# Patient Record
Sex: FEMALE | Race: WHITE | NOT HISPANIC OR LATINO | Employment: OTHER | URBAN - METROPOLITAN AREA
[De-identification: names, ages, dates, MRNs, and addresses within clinical notes are randomized per-mention and may not be internally consistent; named-entity substitution may affect disease eponyms.]

---

## 2017-01-06 ENCOUNTER — MYC MEDICAL ADVICE (OUTPATIENT)
Dept: INFECTIOUS DISEASES | Facility: CLINIC | Age: 67
End: 2017-01-06

## 2017-01-10 NOTE — TELEPHONE ENCOUNTER
Called Virginia January 10, 2017. She is in [place]. [facility] and [facility]. She has been out of the hospital for a week now, finished doxycycline for the pneumonia yesterday. Has a yeast infection as a result of the antibacterial medications. Still short of breath, still coughs a lot. Slow getting better. Getting impatient that it is taking so long to get better. Shower yesterday wiped her out. She appreciated the call to check on her. Crystal Montero, 10:14 AM

## 2017-01-10 NOTE — TELEPHONE ENCOUNTER
From: Luz Thompson  To: Crystal Montero MD  Sent: 1/6/2017 3:47 PM CST  Subject: Recent hospitalization    Hospitalized with right low love pneumonia Tuesday through today Friday. Treated with IV Muessem? and oral Doxycy line 100 mg q 12. Home on 3 more days of the oral stuff. I hope that does it. Blood cultures neg.     [place]

## 2017-01-20 ENCOUNTER — MYC MEDICAL ADVICE (OUTPATIENT)
Dept: INFECTIOUS DISEASES | Facility: CLINIC | Age: 67
End: 2017-01-20

## 2017-01-23 PROBLEM — B38.9 COCCIDIOIDOMYCOSIS: Status: ACTIVE | Noted: 2017-01-23

## 2017-01-30 ENCOUNTER — MYC MEDICAL ADVICE (OUTPATIENT)
Dept: INFECTIOUS DISEASES | Facility: CLINIC | Age: 67
End: 2017-01-30

## 2017-02-01 ENCOUNTER — TELEPHONE (OUTPATIENT)
Dept: TRANSPLANT | Facility: CLINIC | Age: 67
End: 2017-02-01

## 2017-02-01 DIAGNOSIS — Z94.4 LIVER REPLACED BY TRANSPLANT (H): Primary | ICD-10-CM

## 2017-02-01 RX ORDER — SIROLIMUS 0.5 MG/1
0.5 TABLET, SUGAR COATED ORAL DAILY
Qty: 90 TABLET | Refills: 3 | Status: SHIPPED | OUTPATIENT
Start: 2017-02-01 | End: 2017-02-15

## 2017-02-01 NOTE — TELEPHONE ENCOUNTER
Pt's rapamune level a 25 hour level. Pt will decrease to 0.5 mg Daily. Pt will repeat level next week.

## 2017-02-06 ENCOUNTER — TRANSFERRED RECORDS (OUTPATIENT)
Dept: HEALTH INFORMATION MANAGEMENT | Facility: CLINIC | Age: 67
End: 2017-02-06

## 2017-02-07 ENCOUNTER — TELEPHONE (OUTPATIENT)
Dept: TRANSPLANT | Facility: CLINIC | Age: 67
End: 2017-02-07

## 2017-02-07 NOTE — TELEPHONE ENCOUNTER
Pt reports having mouth sores. Pt wants to hold off on a day or two before trying magic mouth wash. Patient will check a rapamune level tomorrow.

## 2017-02-15 ENCOUNTER — TELEPHONE (OUTPATIENT)
Dept: TRANSPLANT | Facility: CLINIC | Age: 67
End: 2017-02-15

## 2017-02-15 DIAGNOSIS — Z94.4 LIVER REPLACED BY TRANSPLANT (H): ICD-10-CM

## 2017-02-15 RX ORDER — SIROLIMUS 0.5 MG/1
0.5 TABLET, SUGAR COATED ORAL EVERY OTHER DAY
Qty: 15 TABLET | Refills: 11 | Status: SHIPPED | OUTPATIENT
Start: 2017-02-15 | End: 2017-03-16

## 2017-02-15 NOTE — TELEPHONE ENCOUNTER
Pt's rapamune level 10.4 and pt has horrible mouth sores. 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 Discussed with DR Ramirez. Patient to take 0.5 mg of rapamune every other day. Pt updated medication card and box while coordinator on the phone.

## 2017-03-01 DIAGNOSIS — B38.2 COCCIDIOIDAL PNEUMONITIS (H): Primary | ICD-10-CM

## 2017-03-01 RX ORDER — CLOTRIMAZOLE 1 %
CREAM (GRAM) TOPICAL 2 TIMES DAILY PRN
Status: ON HOLD | COMMUNITY
Start: 2017-02-23 | End: 2019-08-31

## 2017-03-01 RX ORDER — MOMETASONE FUROATE 1 MG/G
CREAM TOPICAL
Qty: 15 G | Refills: 0 | COMMUNITY
Start: 2017-03-01 | End: 2017-04-19

## 2017-03-01 RX ORDER — ALBUTEROL SULFATE 90 UG/1
2 AEROSOL, METERED RESPIRATORY (INHALATION) EVERY 4 HOURS PRN
COMMUNITY
Start: 2017-03-01 | End: 2017-09-19

## 2017-03-01 RX ORDER — FLUCONAZOLE 200 MG/1
400 TABLET ORAL DAILY
Qty: 30 TABLET | COMMUNITY
Start: 2017-01-19 | End: 2017-03-15 | Stop reason: SINTOL

## 2017-03-15 ENCOUNTER — TELEPHONE (OUTPATIENT)
Dept: TRANSPLANT | Facility: CLINIC | Age: 67
End: 2017-03-15

## 2017-03-15 DIAGNOSIS — Z94.4 LIVER REPLACED BY TRANSPLANT (H): ICD-10-CM

## 2017-03-15 NOTE — TELEPHONE ENCOUNTER
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  Was diagnosed w/ Valley Fever (in [place] for the winter) January 2, very ill.  Follows w/ ID in [place], takes fluconazole 200 bid, uses inhalers.  RAPA is currently 0.5 mg every other day.  She had a 24 hour trough level drawn on 3/13 that came back at 2.5.  She also takes prednisone 5 mg po qd.  She had a chest CT done approx 1 week ago and she said it shows that she is \"improving\" and she feels better but is still extremely fatigued and needs a lot of rest.  Virginia is very particular.  Should I increase her RAPA to daily, or should I leave her w/ the lower level as she is recovering from this infection?  Message to Dr. Montano who is covering for Dr. Ramirez.  "

## 2017-03-16 ENCOUNTER — MYC MEDICAL ADVICE (OUTPATIENT)
Dept: INFECTIOUS DISEASES | Facility: CLINIC | Age: 67
End: 2017-03-16

## 2017-03-16 RX ORDER — SIROLIMUS 0.5 MG/1
1 TABLET, SUGAR COATED ORAL DAILY
Qty: 15 TABLET | Refills: 11 | COMMUNITY
Start: 2017-03-16 | End: 2017-03-27

## 2017-03-16 NOTE — TELEPHONE ENCOUNTER
Spoke to Virginia.  She saw an ID physician yesterday. She has developed a rash on her trunk that the ID  Thinks is from the fluconazole.  They are stopping her fluconazole.  With this she will increase her RAPAmune to 1 mg po qd and repeat drug level early next week.

## 2017-03-16 NOTE — TELEPHONE ENCOUNTER
She is on her 3rd month of treatment. I think it's safe to go back to regular rapa dosing after 3 months of treatment.

## 2017-03-23 DIAGNOSIS — R06.00 DYSPNEA: Primary | ICD-10-CM

## 2017-03-24 ENCOUNTER — TELEPHONE (OUTPATIENT)
Dept: TRANSPLANT | Facility: CLINIC | Age: 67
End: 2017-03-24

## 2017-03-24 NOTE — TELEPHONE ENCOUNTER
Call from [place] stating that she stopped fluconazole on about 3/16 due to hives.  Her recent RAPA level is good.  She will check another RAPA level on 3/28.

## 2017-03-27 ENCOUNTER — MYC MEDICAL ADVICE (OUTPATIENT)
Dept: INFECTIOUS DISEASES | Facility: CLINIC | Age: 67
End: 2017-03-27

## 2017-03-27 DIAGNOSIS — Z94.4 LIVER REPLACED BY TRANSPLANT (H): ICD-10-CM

## 2017-03-27 DIAGNOSIS — B38.2 COCCIDIOIDAL PNEUMONITIS (H): Primary | ICD-10-CM

## 2017-03-27 DIAGNOSIS — B38.9 COCCIDIOIDOMYCOSIS: ICD-10-CM

## 2017-03-27 RX ORDER — SIROLIMUS 0.5 MG/1
0.5 TABLET, SUGAR COATED ORAL EVERY OTHER DAY
Qty: 15 TABLET | Refills: 11 | Status: ON HOLD | COMMUNITY
Start: 2017-03-27 | End: 2017-09-08

## 2017-03-27 RX ORDER — VORICONAZOLE 200 MG/1
200 TABLET, FILM COATED ORAL 2 TIMES DAILY
COMMUNITY
Start: 2017-03-27 | End: 2017-05-10

## 2017-04-11 DIAGNOSIS — Z94.4 LIVER REPLACED BY TRANSPLANT (H): ICD-10-CM

## 2017-04-11 DIAGNOSIS — B10.81 HUMAN HERPESVIRUS 6 VIREMIA: Primary | ICD-10-CM

## 2017-04-11 LAB
ERYTHROCYTE [DISTWIDTH] IN BLOOD BY AUTOMATED COUNT: 18.6 % (ref 10–15)
HCT VFR BLD AUTO: 36.2 % (ref 35–47)
HGB BLD-MCNC: 11.5 G/DL (ref 11.7–15.7)
MCH RBC QN AUTO: 28 PG (ref 26.5–33)
MCHC RBC AUTO-ENTMCNC: 31.8 G/DL (ref 31.5–36.5)
MCV RBC AUTO: 88 FL (ref 78–100)
PLATELET # BLD AUTO: 347 10E9/L (ref 150–450)
RBC # BLD AUTO: 4.11 10E12/L (ref 3.8–5.2)
WBC # BLD AUTO: 8.9 10E9/L (ref 4–11)

## 2017-04-11 PROCEDURE — 87799 DETECT AGENT NOS DNA QUANT: CPT | Mod: 90 | Performed by: FAMILY MEDICINE

## 2017-04-11 PROCEDURE — 85027 COMPLETE CBC AUTOMATED: CPT | Performed by: FAMILY MEDICINE

## 2017-04-11 PROCEDURE — 80048 BASIC METABOLIC PNL TOTAL CA: CPT | Performed by: INTERNAL MEDICINE

## 2017-04-11 PROCEDURE — 83735 ASSAY OF MAGNESIUM: CPT | Performed by: FAMILY MEDICINE

## 2017-04-11 PROCEDURE — 36415 COLL VENOUS BLD VENIPUNCTURE: CPT | Performed by: INTERNAL MEDICINE

## 2017-04-11 PROCEDURE — 80195 ASSAY OF SIROLIMUS: CPT | Performed by: INTERNAL MEDICINE

## 2017-04-11 PROCEDURE — 87799 DETECT AGENT NOS DNA QUANT: CPT | Performed by: INTERNAL MEDICINE

## 2017-04-11 PROCEDURE — 99000 SPECIMEN HANDLING OFFICE-LAB: CPT | Performed by: FAMILY MEDICINE

## 2017-04-11 PROCEDURE — 80076 HEPATIC FUNCTION PANEL: CPT | Performed by: INTERNAL MEDICINE

## 2017-04-12 ENCOUNTER — TELEPHONE (OUTPATIENT)
Dept: TRANSPLANT | Facility: CLINIC | Age: 67
End: 2017-04-12

## 2017-04-12 LAB
ALBUMIN SERPL-MCNC: 3.3 G/DL (ref 3.4–5)
ALP SERPL-CCNC: 210 U/L (ref 40–150)
ALT SERPL W P-5'-P-CCNC: 50 U/L (ref 0–50)
ANION GAP SERPL CALCULATED.3IONS-SCNC: 11 MMOL/L (ref 3–14)
AST SERPL W P-5'-P-CCNC: 24 U/L (ref 0–45)
BILIRUB DIRECT SERPL-MCNC: <0.1 MG/DL (ref 0–0.2)
BILIRUB SERPL-MCNC: 0.2 MG/DL (ref 0.2–1.3)
BUN SERPL-MCNC: 33 MG/DL (ref 7–30)
CALCIUM SERPL-MCNC: 9.2 MG/DL (ref 8.5–10.1)
CHLORIDE SERPL-SCNC: 102 MMOL/L (ref 94–109)
CO2 SERPL-SCNC: 26 MMOL/L (ref 20–32)
CREAT SERPL-MCNC: 1.38 MG/DL (ref 0.52–1.04)
GFR SERPL CREATININE-BSD FRML MDRD: 38 ML/MIN/1.7M2
GLUCOSE SERPL-MCNC: 115 MG/DL (ref 70–99)
MAGNESIUM SERPL-MCNC: 2.5 MG/DL (ref 1.6–2.3)
POTASSIUM SERPL-SCNC: 3.9 MMOL/L (ref 3.4–5.3)
PROT SERPL-MCNC: 7.7 G/DL (ref 6.8–8.8)
SIROLIMUS BLD-MCNC: 9.7 UG/L (ref 5–15)
SODIUM SERPL-SCNC: 139 MMOL/L (ref 133–144)
TME LAST DOSE: NORMAL H

## 2017-04-13 ENCOUNTER — TELEPHONE (OUTPATIENT)
Dept: GASTROENTEROLOGY | Facility: CLINIC | Age: 67
End: 2017-04-13

## 2017-04-13 LAB
EBV DNA # SPEC NAA+PROBE: ABNORMAL {COPIES}/ML
EBV DNA SPEC NAA+PROBE-LOG#: ABNORMAL {LOG_COPIES}/ML

## 2017-04-13 NOTE — TELEPHONE ENCOUNTER
Patient contacted and reminded of upcoming appointment.  Patient confirmed they will be attending.  Patient instructed to bring updated medications list to appointment.  Patient instructed to arrive early for check-in  Chilango Mcgee CMA

## 2017-04-14 LAB
DIAGNOSTIC IMP SPEC-IMP: NORMAL
EBV DNA # SPEC NAA+PROBE: NORMAL {COPIES}/ML
EBV DNA SPEC NAA+PROBE-LOG#: NORMAL {LOG_COPIES}/ML
SPECIMEN SOURCE: NORMAL

## 2017-04-19 ENCOUNTER — TELEPHONE (OUTPATIENT)
Dept: GASTROENTEROLOGY | Facility: CLINIC | Age: 67
End: 2017-04-19

## 2017-04-19 ENCOUNTER — OFFICE VISIT (OUTPATIENT)
Dept: GASTROENTEROLOGY | Facility: CLINIC | Age: 67
End: 2017-04-19
Attending: INTERNAL MEDICINE
Payer: MEDICARE

## 2017-04-19 VITALS
OXYGEN SATURATION: 98 % | TEMPERATURE: 97.6 F | SYSTOLIC BLOOD PRESSURE: 160 MMHG | HEIGHT: 67 IN | BODY MASS INDEX: 30.79 KG/M2 | DIASTOLIC BLOOD PRESSURE: 83 MMHG | HEART RATE: 61 BPM | WEIGHT: 196.2 LBS

## 2017-04-19 DIAGNOSIS — Z94.4 LIVER REPLACED BY TRANSPLANT (H): Primary | ICD-10-CM

## 2017-04-19 DIAGNOSIS — I48.0 PAROXYSMAL ATRIAL FIBRILLATION (H): ICD-10-CM

## 2017-04-19 PROCEDURE — 99212 OFFICE O/P EST SF 10 MIN: CPT | Mod: ZF

## 2017-04-19 ASSESSMENT — PAIN SCALES - GENERAL: PAINLEVEL: NO PAIN (0)

## 2017-04-19 NOTE — LETTER
4/19/2017      RE: Luz Thompson  [address]  [place] [address]       I had the pleasure of seeing Luz Thompson for followup in the Liver Transplantation Clinic at the [facility] on 04/19/2017.  Ms. Thompson returns for followup now roughly 15 years status post liver transplantation for biliary cirrhosis.      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  She was hospitalized twice at [facility] in [place] where she was originally started on Diflucan; however, she did not feel well on the drug and ultimately developed a rash and was switched to Voriconazole which she is tolerating much better.  She has had an affect on her immunosuppressive medications, however.        She is doing much better at this visit since being on Voriconazole.  She denies any abdominal pain, itching or skin rash and has improving fatigue.  She denies any increased abdominal girth or lower extremity edema.  She denies any fevers or chills, cough or shortness of breath.  She denies any nausea or vomiting, diarrhea or constipation.  Her appetite is improving, and she is recovering some of the weight that she lost.        The only other new event since she was last seen is that she did develop an episode of atrial fibrillation.  This was when she was quite ill and in the hospital, but she does believe she had had a couple more episodes as an outpatient.  She has not had any syncopal episode; and fortunately, no stroke-like symptoms.       Current Outpatient Prescriptions   Medication     RAPAMUNE 0.5 MG PO TABLET     voriconazole (VFEND) 200 MG tablet     fluticasone-vilanterol (BREO ELLIPTA) 100-25 MCG/INH oral inhaler     albuterol (ALBUTEROL) 108 (90 BASE) MCG/ACT Inhaler     losartan (COZAAR) 25 MG tablet     ursodiol (ACTIGALL) 250 MG tablet     Wheat Dextrin (BENEFIBER) POWD     SUMAtriptan (IMITREX) 50 MG tablet     meclizine  (ANTIVERT) 25 MG tablet     levothyroxine (SYNTHROID, LEVOTHROID) 150 MCG tablet     folic acid (FOLVITE) 1 MG tablet     furosemide (LASIX) 20 MG tablet     predniSONE (DELTASONE) 5 MG tablet     STATIN NOT PRESCRIBED, INTENTIONAL,     Multiple Vitamins-Minerals (PRESERVISION AREDS 2) CAPS     calcitRIOL (ROCALTROL) 0.25 MCG capsule     Hypromellose (ARTIFICIAL TEARS OP)     acetaminophen 500 MG CAPS     magnesium 250 MG tablet     aspirin 81 MG EC tablet     clotrimazole (LOTRIMIN) 1 % cream     [DISCONTINUED] Multiple Vitamins-Minerals (VISION VITAMINS) TABS     triamcinolone (KENALOG) 0.1 % cream     No current facility-administered medications for this visit.      B/P: 160/83, T: 97.6, P: 61, R: Data Unavailable    HEENT exam shows no scleral icterus and no temporal muscle wasting.  Chest is clear.  Abdominal exam shows no increase in girth.  No masses or tenderness to palpation are present.  Her liver is 10 cm in span without left lobe enlargement.  No spleen tip is palpable, and extremity exam shows no edema.  Skin exam shows no stigmata of chronic liver disease or particularly suspicious lesions.  She is going to see a dermatologist later this month.  Neurologic exam is nonfocal.       Her most recent laboratory tests show her white count was 8.9, hemoglobin 11.5, platelets 347,000, sodium 139, potassium 3.9, BUN is 33, creatinine 1.38, AST is 24, ALT is 50, alkaline phosphatase 210.  Albumin is 3.3 with a total protein of 7.7, total bilirubin is 0.2.  Her last Sirolimus level was 9.2.      My impression is that Ms. Thompson is 15 years status post liver transplantation.  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  She will be seeing Dr. Montero later to follow up on her coccidioidomycosis.  That seems to be under good control on the Voriconazole.        She is due for a colonoscopy which we go  ahead and schedule and also see if the cardiac electrophysiologist about her history of atrial fibrillation or additional symptomatology.      Thank you very much for allowing me to participate in the care of this patient.  If you have any questions regarding my recommendations, please do not hesitate to contact me.       Gentry Ramirez MD      Professor of Medicine  [employer]      Executive Medical Director, Solid Organ Transplant Program  [facility]

## 2017-04-19 NOTE — TELEPHONE ENCOUNTER
Contacted patient to schedule colonoscopy as ordered by Dr. Ramirez. Patient would like to schedule in August with Dr. Montano. Patient needs to check calendar so will call back to schedule.  Trisha Mcrae RN

## 2017-04-19 NOTE — MR AVS SNAPSHOT
After Visit Summary   4/19/2017    Luz Thompson    MRN: [id number]           Patient Information     Date Of Birth          1950        Visit Information        Provider Department      4/19/2017 1:00 PM Gentry Ramirez MD [facility]        Today's Diagnoses     Liver replaced by transplant (H)    -  1    Paroxysmal atrial fibrillation (H)           Follow-ups after your visit        Follow-up notes from your care team     Return in about 5 months (around 9/19/2017).      Your next 10 appointments already scheduled     Apr 24, 2017  2:20 PM CDT   (Arrive by 2:05 PM)   CT CHEST W/O CONTRAST with UCCT1   [facility] [place] [facility] ([facility])    [address] [address] [phone]           Please bring any scans or X-rays taken at other hospitals, if similar tests were done. Also bring a list of your medicines, including vitamins, minerals and over-the-counter drugs. It is safest to leave personal items at home.  Be sure to tell your doctor:   If you have any allergies.   If there s any chance you are pregnant.   If you are breastfeeding.   If you have any special needs.  You do not need to do anything special to prepare.  Please wear loose clothing, such as a sweat suit or jogging clothes. Avoid snaps, zippers and other metal. We may ask you to undress and put on a hospital gown.            Apr 24, 2017  3:00 PM CDT   FULL PULMONARY FUNCTION with  PFL D   [facility]monary Function Testing ([facility])    [address] [id number] [phone]            Apr 24, 2017  4:00 PM CDT   (Arrive by 3:45 PM)   Return Visit with Eden Clinton MD   [facility] ([facility])    [address] [id number] [phone]            Apr 25, 2017  1:30 PM CDT   (Arrive by 1:15 PM)    Return Visit with Mavis Bermudez MD   [facility] ([facility])    [address] [place] [id number] [phone]            May 08, 2017  4:00 PM CDT   (Arrive by 3:45 PM)   ATRIAL FIBULATION VISIT with Graham Gilmore MD   [facility] ([facility])    [address] [place] [id number] [phone]            May 15, 2017 11:00 AM CDT   (Arrive by 10:30 AM)   RETURN ENDOCRINE with Rach Vasquez MD   [facility] Endocrinology ([facility])    [address] [place] [id number] [phone]            May 24, 2017 10:00 AM CDT   (Arrive by 9:30 AM)   Return Visit with Crystal Montero MD   [facility] ([facility])    [address] [place] [id number] [phone]            Sep 19, 2017 11:15 AM CDT   (Arrive by 11:00 AM)   Return General Liver with Gentry Ramirez MD   [facility] ([facility])    [address] [place] [id number] [phone]              Future tests that were ordered for you today     Open Future Orders        Priority Expected Expires Ordered    CT Chest w/o contrast Routine  4/19/2018 4/19/2017            Who to contact     If you have questions or need follow up information about today's clinic visit or your schedule please contact [facility] directly at [phone].  Normal or non-critical lab and imaging results will be communicated to you by MyChart, letter or phone within 4 business days after the clinic has received the results. If you do not hear from us within 7 days, please contact the clinic through MyChart or phone. If you have a critical or abnormal lab result, we will notify you by phone as soon as possible.  Submit refill requests  "through [employer] or call your pharmacy and they will forward the refill request to us. Please allow 3 business days for your refill to be completed.          Additional Information About Your Visit        Medical [employer] Information     [employer] gives you secure access to your electronic health record. If you see a primary care provider, you can also send messages to your care team and make appointments. If you have questions, please call your primary care clinic.  If you do not have a primary care provider, please call [phone] and they will assist you.        Care EveryWhere ID     This is your Care EveryWhere ID. This could be used by other organizations to access your [place] medical records  [id number]        Your Vitals Were     Pulse Temperature Height Pulse Oximetry BMI (Body Mass Index)       61 97.6  F (36.4  C) (Oral) 1.702 m (5' 7\") 98% 30.73 kg/m2        Blood Pressure from Last 3 Encounters:   04/19/17 160/83   09/21/16 161/77   09/06/16 155/80    Weight from Last 3 Encounters:   04/19/17 89 kg (196 lb 3.2 oz)   09/21/16 91.1 kg (200 lb 14.4 oz)   09/06/16 90.2 kg (198 lb 12.8 oz)              We Performed the Following     Lipid panel reflex to direct LDL     OFFICE CONSULTATION,LEVEL IV        Primary Care Provider Office Phone # Fax #    Jennifer Barraza [phone]       [facility] [address]  [place] [address]        Thank you!     Thank you for choosing [facility]  for your care. Our goal is always to provide you with excellent care. Hearing back from our patients is one way we can continue to improve our services. Please take a few minutes to complete the written survey that you may receive in the mail after your visit with us. Thank you!             Your Updated Medication List - Protect others around you: Learn how to safely use, store and throw away your medicines at www.disposemymeds.org.          This list is accurate as of: 4/19/17  1:53 " PM.  Always use your most recent med list.                   Brand Name Dispense Instructions for use    acetaminophen 500 MG Caps      Take 500 mg by mouth as needed Take 500-1,000 mg by mouth every 6 hours if needed.       albuterol 108 (90 BASE) MCG/ACT Inhaler   Generic drug:  albuterol      Inhale 2 puffs into the lungs every 4 hours as needed for shortness of breath / dyspnea or wheezing       ARTIFICIAL TEARS OP      Apply 1 drop to eye as needed       aspirin 81 MG EC tablet      Take 1 tablet by mouth daily.       BENEFIBER Powd      2 teaspoons daily       BREO ELLIPTA 100-25 MCG/INH oral inhaler   Generic drug:  fluticasone-vilanterol      Inhale 1 puff into the lungs daily       calcitRIOL 0.25 MCG capsule    ROCALTROL    180 capsule    Take 2 tablets daily       clotrimazole 1 % cream    LOTRIMIN     Apply topically 2 times daily Reported on 4/19/2017       folic acid 1 MG tablet    FOLVITE    90 tablet    Take 1 tablet (1 mg) by mouth daily       furosemide 20 MG tablet    LASIX    90 tablet    Take 1 tablet (20 mg) by mouth every other day       levothyroxine 150 MCG tablet    SYNTHROID/LEVOTHROID    96 tablet    Take one tablet daily 6 days a week and one and a half tablets one day a week.       losartan 25 MG tablet    COZAAR    90 tablet    Take 1 tablet (25 mg) by mouth daily       magnesium 250 MG tablet      Take 2 tablets by mouth daily At night.       meclizine 25 MG tablet    ANTIVERT    30 tablet    Take 1 tablet (25 mg) by mouth as needed       predniSONE 5 MG tablet    DELTASONE    90 tablet    Take 1 tablet (5 mg) by mouth daily       PRESERVISION AREDS 2 Caps      Take 2 capsules by mouth daily       sirolimus Tabs tablet     15 tablet    Take 0.5 mg by mouth every other day       STATIN NOT PRESCRIBED (INTENTIONAL)     0 each    Statin not prescribed intentionally due to Intolerance (with supporting documentation of trying a statin at least once within the last 5 years)        SUMAtriptan 50 MG tablet    IMITREX    30 tablet    Take 1 tablet (50 mg) by mouth at onset of headache for migraine repeat after 2 hours if needed.       triamcinolone 0.1 % cream    KENALOG    80 g    Apply topically 2 times daily Apply to rash       ursodiol 250 MG tablet    ACTIGALL    180 tablet    Take 1 tablet (250 mg) by mouth 2 times daily       voriconazole 200 MG tablet    VFEND     Take 1 tablet (200 mg) by mouth 2 times daily

## 2017-04-19 NOTE — NURSING NOTE
Chief Complaint   Patient presents with     RECHECK     Post Liver TXP   Pt roomed, vitals, meds, and allergies reviewed with pt. Pt ready for provider.  Chilango Mcgee, CMA

## 2017-04-19 NOTE — PROGRESS NOTES
I had the pleasure of seeing Luz Thompson for followup in the Liver Transplantation Clinic at the [facility] on 04/19/2017.  Ms. Thompson returns for followup now roughly 15 years status post liver transplantation for biliary cirrhosis.      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  She was hospitalized twice at [facility] in [place] where she was originally started on Diflucan; however, she did not feel well on the drug and ultimately developed a rash and was switched to Voriconazole which she is tolerating much better.  She has had an affect on her immunosuppressive medications, however.        She is doing much better at this visit since being on Voriconazole.  She denies any abdominal pain, itching or skin rash and has improving fatigue.  She denies any increased abdominal girth or lower extremity edema.  She denies any fevers or chills, cough or shortness of breath.  She denies any nausea or vomiting, diarrhea or constipation.  Her appetite is improving, and she is recovering some of the weight that she lost.        The only other new event since she was last seen is that she did develop an episode of atrial fibrillation.  This was when she was quite ill and in the hospital, but she does believe she had had a couple more episodes as an outpatient.  She has not had any syncopal episode; and fortunately, no stroke-like symptoms.       Current Outpatient Prescriptions   Medication     RAPAMUNE 0.5 MG PO TABLET     voriconazole (VFEND) 200 MG tablet     fluticasone-vilanterol (BREO ELLIPTA) 100-25 MCG/INH oral inhaler     albuterol (ALBUTEROL) 108 (90 BASE) MCG/ACT Inhaler     losartan (COZAAR) 25 MG tablet     ursodiol (ACTIGALL) 250 MG tablet     Wheat Dextrin (BENEFIBER) POWD     SUMAtriptan (IMITREX) 50 MG tablet     meclizine (ANTIVERT) 25 MG tablet     levothyroxine (SYNTHROID, LEVOTHROID) 150 MCG tablet      folic acid (FOLVITE) 1 MG tablet     furosemide (LASIX) 20 MG tablet     predniSONE (DELTASONE) 5 MG tablet     STATIN NOT PRESCRIBED, INTENTIONAL,     Multiple Vitamins-Minerals (PRESERVISION AREDS 2) CAPS     calcitRIOL (ROCALTROL) 0.25 MCG capsule     Hypromellose (ARTIFICIAL TEARS OP)     acetaminophen 500 MG CAPS     magnesium 250 MG tablet     aspirin 81 MG EC tablet     clotrimazole (LOTRIMIN) 1 % cream     [DISCONTINUED] Multiple Vitamins-Minerals (VISION VITAMINS) TABS     triamcinolone (KENALOG) 0.1 % cream     No current facility-administered medications for this visit.      B/P: 160/83, T: 97.6, P: 61, R: Data Unavailable    HEENT exam shows no scleral icterus and no temporal muscle wasting.  Chest is clear.  Abdominal exam shows no increase in girth.  No masses or tenderness to palpation are present.  Her liver is 10 cm in span without left lobe enlargement.  No spleen tip is palpable, and extremity exam shows no edema.  Skin exam shows no stigmata of chronic liver disease or particularly suspicious lesions.  She is going to see a dermatologist later this month.  Neurologic exam is nonfocal.       Her most recent laboratory tests show her white count was 8.9, hemoglobin 11.5, platelets 347,000, sodium 139, potassium 3.9, BUN is 33, creatinine 1.38, AST is 24, ALT is 50, alkaline phosphatase 210.  Albumin is 3.3 with a total protein of 7.7, total bilirubin is 0.2.  Her last Sirolimus level was 9.2.      My impression is that Ms. Thompson is 15 years status post liver transplantation.  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  She will be seeing Dr. Montero later to follow up on her coccidioidomycosis.  That seems to be under good control on the Voriconazole.        She is due for a colonoscopy which we go ahead and schedule and also see if the cardiac electrophysiologist about her history of  atrial fibrillation or additional symptomatology.      Thank you very much for allowing me to participate in the care of this patient.  If you have any questions regarding my recommendations, please do not hesitate to contact me.       Gentry Ramirez MD      Professor of Medicine  [employer]      Executive Medical Director, Solid Organ Transplant Program  [facility]

## 2017-04-24 ENCOUNTER — OFFICE VISIT (OUTPATIENT)
Dept: PULMONOLOGY | Facility: CLINIC | Age: 67
End: 2017-04-24
Attending: INTERNAL MEDICINE
Payer: MEDICARE

## 2017-04-24 ENCOUNTER — TELEPHONE (OUTPATIENT)
Dept: INFECTIOUS DISEASES | Facility: CLINIC | Age: 67
End: 2017-04-24

## 2017-04-24 VITALS
HEIGHT: 68 IN | OXYGEN SATURATION: 93 % | DIASTOLIC BLOOD PRESSURE: 84 MMHG | SYSTOLIC BLOOD PRESSURE: 154 MMHG | BODY MASS INDEX: 29.7 KG/M2 | WEIGHT: 196 LBS

## 2017-04-24 DIAGNOSIS — R06.00 DYSPNEA: ICD-10-CM

## 2017-04-24 DIAGNOSIS — Z94.4 LIVER REPLACED BY TRANSPLANT (H): Primary | ICD-10-CM

## 2017-04-24 DIAGNOSIS — B38.2 PRIMARY COCCIDIOIDOMYCOSIS (PULMONARY) (H): Primary | ICD-10-CM

## 2017-04-24 PROCEDURE — 99211 OFF/OP EST MAY X REQ PHY/QHP: CPT | Mod: ZF

## 2017-04-24 PROCEDURE — 40000809 ZZH STATISTIC NO DOCUMENTATION TO SUPPORT CHARGE

## 2017-04-24 ASSESSMENT — PAIN SCALES - GENERAL: PAINLEVEL: NO PAIN (0)

## 2017-04-24 NOTE — NURSING NOTE
Chief Complaint   Patient presents with     Follow Up For     Return Visit    Elizabeth Don at 3:43 PM on 4/24/2017

## 2017-04-24 NOTE — TELEPHONE ENCOUNTER
Upon chart review, pt's voriconazole is already listed in our system under Dr. Montero's name.  Gayatri Santiago RN

## 2017-04-24 NOTE — MR AVS SNAPSHOT
After Visit Summary   4/24/2017    Luz Tohmpson    MRN: [id number]           Patient Information     Date Of Birth          1950        Visit Information        Provider Department      4/24/2017 4:00 PM Eden Clinton MD [facility]        Today's Diagnoses     Primary coccidioidomycosis (pulmonary) (H)    -  1      Care Instructions    I agree with stopping inhalers  Repeat Ct in about 3 months        Follow-ups after your visit        Follow-up notes from your care team     Return in about 3 months (around 7/24/2017), or if symptoms worsen or fail to improve.      Your next 10 appointments already scheduled     Apr 25, 2017  1:30 PM CDT   (Arrive by 1:15 PM)   Return Visit with Mavis Bermudez MD   [facility] D[facility]at[facility] ([facility])    [address] [place] [id number] [phone]            May 02, 2017 12:00 PM CDT   Return Visit with Randell Mejia MD   [facility] ([facility])    [address] [place] [id number]   [phone]            May 08, 2017  4:00 PM CDT   (Arrive by 3:45 PM)   ATRIAL FIBULATION VISIT with Graham Gilmore MD   [facility] Heart Care ([facility])    [address] [place] [id number] [phone]            May 15, 2017 11:00 AM CDT   (Arrive by 10:30 AM)   RETURN ENDOCRINE with Rach Vasquez MD   [facility] Endocrinology ([facility])    [address] [place] [id number] [phone]            May 24, 2017 10:00 AM CDT   (Arrive by 9:30 AM)   Return Visit with Crystal Montero MD   [facility] ([facility])    [address] [place] [id number] [phone]            Jul 31, 2017 10:30 AM CDT    (Arrive by 10:15 AM)   Return Visit with Eden Clinton MD   [facility] ([facility])    [address] [facility] [id number] [phone]            Sep 19, 2017 11:15 AM CDT   (Arrive by 11:00 AM)   Return General Liver with Gentry Ramirez MD   [facility] Hepatology ([facility])    [address] [id number] [phone]              Future tests that were ordered for you today     Open Future Orders        Priority Expected Expires Ordered    CT Chest w/o Contrast Routine 7/23/2017 5/24/2018 4/24/2017            Who to contact     If you have questions or need follow up information about today's clinic visit or your schedule please contact [facility] directly at [phone].  Normal or non-critical lab and imaging results will be communicated to you by MyChart, letter or phone within 4 business days after the clinic has received the results. If you do not hear from us within 7 days, please contact the clinic through [employer] or phone. If you have a critical or abnormal lab result, we will notify you by phone as soon as possible.  Submit refill requests through [employer] or call your pharmacy and they will forward the refill request to us. Please allow 3 business days for your refill to be completed.          Additional Information About Your Visit        [employer] Information     [employer] gives you secure access to your electronic health record. If you see a primary care provider, you can also send messages to your care team and make appointments. If you have questions, please call your primary care clinic.  If you do not have a primary care provider, please call [phone] and they will assist you.        Care EveryWhere ID     This is your Care EveryWhere ID. This could be used by other organizations to access your [facility]  "records  [id number]        Your Vitals Were     Height Pulse Oximetry BMI (Body Mass Index)             1.715 m (5' 7.5\") 93% 30.24 kg/m2          Blood Pressure from Last 3 Encounters:   04/24/17 154/84   04/19/17 160/83   09/21/16 161/77    Weight from Last 3 Encounters:   04/24/17 88.9 kg (196 lb)   04/19/17 89 kg (196 lb 3.2 oz)   09/21/16 91.1 kg (200 lb 14.4 oz)               Primary Care Provider Office Phone # Fax #    Jennifer Amparo Barraza [phone]       [facility] [address] [facility] [address]        Thank you!     Thank you for choosing [facility]  for your care. Our goal is always to provide you with excellent care. Hearing back from our patients is one way we can continue to improve our services. Please take a few minutes to complete the written survey that you may receive in the mail after your visit with us. Thank you!             Your Updated Medication List - Protect others around you: Learn how to safely use, store and throw away your medicines at www.disposemymeds.org.          This list is accurate as of: 4/24/17  4:23 PM.  Always use your most recent med list.                   Brand Name Dispense Instructions for use    acetaminophen 500 MG Caps      Take 500 mg by mouth as needed Take 500-1,000 mg by mouth every 6 hours if needed.       albuterol 108 (90 BASE) MCG/ACT Inhaler   Generic drug:  albuterol      Inhale 2 puffs into the lungs every 4 hours as needed for shortness of breath / dyspnea or wheezing       ARTIFICIAL TEARS OP      Apply 1 drop to eye as needed       aspirin 81 MG EC tablet      Take 1 tablet by mouth daily.       BENEFIBER Powd      2 teaspoons daily       BREO ELLIPTA 100-25 MCG/INH oral inhaler   Generic drug:  fluticasone-vilanterol      Inhale 1 puff into the lungs daily       calcitRIOL 0.25 MCG capsule    ROCALTROL    180 capsule    Take 2 tablets daily       clotrimazole 1 % cream    " LOTRIMIN     Apply topically 2 times daily Reported on 4/19/2017       folic acid 1 MG tablet    FOLVITE    90 tablet    Take 1 tablet (1 mg) by mouth daily       furosemide 20 MG tablet    LASIX    90 tablet    Take 1 tablet (20 mg) by mouth every other day       levothyroxine 150 MCG tablet    SYNTHROID/LEVOTHROID    96 tablet    Take one tablet daily 6 days a week and one and a half tablets one day a week.       losartan 25 MG tablet    COZAAR    90 tablet    Take 1 tablet (25 mg) by mouth daily       magnesium 250 MG tablet      Take 2 tablets by mouth daily At night.       meclizine 25 MG tablet    ANTIVERT    30 tablet    Take 1 tablet (25 mg) by mouth as needed       predniSONE 5 MG tablet    DELTASONE    90 tablet    Take 1 tablet (5 mg) by mouth daily       PRESERVISION AREDS 2 Caps      Take 2 capsules by mouth daily       sirolimus Tabs tablet     15 tablet    Take 0.5 mg by mouth every other day       STATIN NOT PRESCRIBED (INTENTIONAL)     0 each    Statin not prescribed intentionally due to Intolerance (with supporting documentation of trying a statin at least once within the last 5 years)       SUMAtriptan 50 MG tablet    IMITREX    30 tablet    Take 1 tablet (50 mg) by mouth at onset of headache for migraine repeat after 2 hours if needed.       triamcinolone 0.1 % cream    KENALOG    80 g    Apply topically 2 times daily Apply to rash       ursodiol 250 MG tablet    ACTIGALL    180 tablet    Take 1 tablet (250 mg) by mouth 2 times daily       voriconazole 200 MG tablet    VFEND     Take 1 tablet (200 mg) by mouth 2 times daily

## 2017-04-24 NOTE — TELEPHONE ENCOUNTER
Patient called to request her Rx for voriconazole be switch to being under Dr. Montero's name. She received an Rx when she was in [place] over the winter, but will be here and would like it to be under Dr. Montero's name going forward. A new Rx can be sent to the [facility] in [place] and she takes 200mg BID.    She also knows that her insurance has some issues covering this medication, and she had to go through a prior auth process when in [place]. She anticipates this will happen again with the Rx from Dr. Montero. Her insurance company can be reached for prior auth info at [phone]

## 2017-04-24 NOTE — PROGRESS NOTES
Reason for Visit  Luz Thompson is a 67 year old female who is seen in follow up   Pulmonary HPI  In January hospitalized in [place] twice, diagnosed with Valley Fever and didn't tolerate fluconazole, now on voriconazole. Started on Breo and ProAir. She forgot to take Breo the last few days and felt fine.     The patient was seen and examined by Eden Clinton MD   Current Outpatient Prescriptions   Medication     RAPAMUNE 0.5 MG PO TABLET     voriconazole (VFEND) 200 MG tablet     fluticasone-vilanterol (BREO ELLIPTA) 100-25 MCG/INH oral inhaler     albuterol (ALBUTEROL) 108 (90 BASE) MCG/ACT Inhaler     clotrimazole (LOTRIMIN) 1 % cream     losartan (COZAAR) 25 MG tablet     ursodiol (ACTIGALL) 250 MG tablet     Wheat Dextrin (BENEFIBER) POWD     SUMAtriptan (IMITREX) 50 MG tablet     meclizine (ANTIVERT) 25 MG tablet     levothyroxine (SYNTHROID, LEVOTHROID) 150 MCG tablet     folic acid (FOLVITE) 1 MG tablet     furosemide (LASIX) 20 MG tablet     predniSONE (DELTASONE) 5 MG tablet     STATIN NOT PRESCRIBED, INTENTIONAL,     Multiple Vitamins-Minerals (PRESERVISION AREDS 2) CAPS     calcitRIOL (ROCALTROL) 0.25 MCG capsule     triamcinolone (KENALOG) 0.1 % cream     Hypromellose (ARTIFICIAL TEARS OP)     acetaminophen 500 MG CAPS     magnesium 250 MG tablet     aspirin 81 MG EC tablet     No current facility-administered medications for this visit.      Allergies   Allergen Reactions     Benadryl [Diphenhydramine Hcl]      Insomnia      Cellcept Diarrhea     Ciprofloxacin Other (See Comments)     Insomnia, mood lability     Codeine      Psych disturbance     Doxycycline      Lansoprazole Diarrhea     Levaquin [Levofloxacin] Other (See Comments)     Headache, hyperactivity     Lisinopril Cough     Methotrexate      Sores     Morphine Itching     Morphine Sulfate Itching     Pepcid Diarrhea     Prevacid Diarrhea     Zantac Diarrhea     Penicillin G Itching and Rash     Penicillins Rash     Tolectin  "[Nsaids] Rash     Tramadol Rash     Social History     Social History     Marital status:      Spouse name: Robbin Thompson     Number of children: 4     Years of education: 20     Occupational History     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 She and her  are traveling around the [place] in an RV. They are planning to be in [place] for the winter.        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     Diagnostic skin and sensitization tests NOT ALLERGIC TO CORN/MILK PER IGE TESTS    10/25/12 IgE tests for corn/milk NEGATIVE     Diverticulosis of sigmoid colon 12/21/2013     GERD (gastroesophageal reflux disease)      H/O esophageal varices      H/O liver transplant (H) 2002    due to primary biliary cirrhosis     Heart murmur 4/4/2012     Hyperlipidemia 4/10/2012    Says that she does not have it anymore, not on meds     Hypocalcemia      MEDIAL MENISCUS TEAR - right 8/2/2012     Migraines 4/4/2012     Osteoarthritis of right knee 8/2/2012     Osteoporosis 4/20/2012     Papillary thyroid carcinoma (H)      Post-surgical hypothyroidism      Primary " "biliary cirrhosis (H)     s/p Liver transplant, [id number]     Sjogren's syndrome (H)      Thyroid cancer (H)      Unspecified cerebral artery occlusion with cerebral infarction     when BP was very low, small multiple infacts in frontal lobe, had \"visual field cut,\" leg weakness, and expressive aphasia - all have resolved.      Unspecified nonsenile cataract      Vitamin D deficiency 10/1/2012     VRE (vancomycin-resistant Enterococci)      Past Surgical History:   Procedure Laterality Date     APPENDECTOMY       BIOPSY       CATARACT IOL, RT/LT      RE2013, LE12/10/2013 - Toric lenses     CHOLECYSTECTOMY       COLONOSCOPY       COLONOSCOPY  3/10/2014    Procedure: COLONOSCOPY;;  Surgeon: Gentry Ramirez MD;  Location: [facility]     ENDOSCOPIC RETROGRADE CHOLANGIOPANCREATOGRAM  2013    Procedure: ENDOSCOPIC RETROGRADE CHOLANGIOPANCREATOGRAM;  Endoscopic Retrograde Cholangiopancreatogram with single balloon enteroscopy, ballon sweep of bile duct;  Surgeon: Brett Membreno MD;  Location: [facility] OR     ENT SURGERY      ear drum repair     HC KNEE SCOPE,MED/LAT MENISECTOMY  8/10/12    Right, partial medial menisectomy only     KNEE SURGERY  1966    R knee     PICC INSERTION  2013    4fr SL PASV PICC, 40cm (1cm external) in the R basilic vein w/ tip in the low SVC     PICC INSERTION  2014    5 fr DL BioFlo Navilyst PICC, 46 cm (3 cm external) in the L basilic vein w/ tip in the SVC RA junction.     THYROIDECTOMY  3/2010     TRANSPLANT LIVER RECIPIENT LIVING UNRELATED       Family History   Problem Relation Age of Onset     Hypertension Mother      Lipids Mother      Prostate Cancer Father      Macular Degeneration Father      Cancer - colorectal Maternal Grandmother      in her 80's, has surgery and removal of part of kidney,  at age 98     Colon Cancer Maternal Grandmother      Eye Disorder Maternal Grandfather      Glaucoma     HEART DISEASE Maternal Grandfather       at 98     " "Glaucoma Maternal Grandfather      CEREBROVASCULAR DISEASE Paternal Grandmother      in her 80's     Hypertension Paternal Grandmother      HEART DISEASE Paternal Grandfather      MI     Allergies Son      Neurologic Disorder Daughter      Migraines     Anesthesia Reaction No family hx of      Crohn Disease No family hx of      Ulcerative Colitis No family hx of      ROS Pulmonary  Dyspnea: Yes, Cough: No, Chest pain: No, Wheezing: No, Sputum Production: No, Hemoptysis: No  A complete ROS was otherwise negative except as noted in the HPI.  /84 (BP Location: Right arm, Patient Position: Chair, Cuff Size: Adult Large)  Ht 1.715 m (5' 7.5\")  Wt 88.9 kg (196 lb)  SpO2 93%  BMI 30.24 kg/m2  Exam:   GENERAL APPEARANCE: Well developed, well nourished, alert, and in no apparent distress.  EYES: PERRL, EOMI  HENT: Nasal mucosa with no edema and no hyperemia. No nasal polyps.  EARS: Canals clear, TMs normal  MOUTH: Oral mucosa is moist, without any lesions, no tonsillar enlargement, no oropharyngeal exudate.  NECK: supple, no masses, no thyromegaly.  LYMPHATICS: No significant axillary, cervical, or supraclavicular nodes.  RESP: normal percussion, good air flow throughout.  No crackles. No rhonchi. No wheezes.  CV: Normal S1, S2, regular rhythm, normal rate. No murmur.  No rub. No gallop. No LE edema.   ABDOMEN:  Bowel sounds normal, soft, nontender, no HSM or masses.   MS: extremities normal. No clubbing. No cyanosis.  SKIN: no rash on limited exam  NEURO: Mentation intact, speech normal, normal strength and tone, normal gait and stance  PSYCH: mentation appears normal. and affect normal/bright  Results:  PFTs  today reviewed, normal spirometry and mildly reduced DLCO when corrected for hemoglobin   CT chest 3/9/17 and 4/24/17 reviewed and stable Lingular opacity (also old RLL calcified nodules)    Assessment and plan: Virginia is a 67-year-old female with a remote history of liver transplant for primary biliary " cirrhosis who is seen for follow up of Valley Fever January 2017 in [place]. CT chest today is stable, consistent with resolving infection. She remains on voriconazole suppression.    - no underlying lung disease, stop inhalers  - repeat CT in 3 months     RTC 3 months

## 2017-04-25 ENCOUNTER — OFFICE VISIT (OUTPATIENT)
Dept: DERMATOLOGY | Facility: CLINIC | Age: 67
End: 2017-04-25

## 2017-04-25 DIAGNOSIS — D22.9 MULTIPLE NEVI: ICD-10-CM

## 2017-04-25 DIAGNOSIS — L82.0 INFLAMED SEBORRHEIC KERATOSIS: ICD-10-CM

## 2017-04-25 DIAGNOSIS — L57.0 AK (ACTINIC KERATOSIS): Primary | ICD-10-CM

## 2017-04-25 DIAGNOSIS — L30.9 ACUTE DERMATITIS: ICD-10-CM

## 2017-04-25 ASSESSMENT — PAIN SCALES - GENERAL: PAINLEVEL: NO PAIN (0)

## 2017-04-25 NOTE — NURSING NOTE
Dermatology Rooming Note    Luz Thompson's goals for this visit include:   Chief Complaint   Patient presents with     Derm Problem     Virginia states that she has a spot on her foot that is scaly and bleeds when washing.     Derm Problem     Virginia states she has hive like reaction from medication.     Rosy Williamson CMA

## 2017-04-25 NOTE — LETTER
"4/25/2017     RE: Luz Thompson  [address]  [facility] [facility] [address]     Dear Colleague,    Thank you for referring your patient, Luz Thompson, to the [facility] DERMATOLOGY at [facility]. Please see a copy of my visit note below.    [facility] Dermatology Note    Last Visit: 7/18/16    CC:   Chief Complaint   Patient presents with     Derm Problem     [place] states that she has a spot on her foot that is scaly and bleeds when washing.     Derm Problem     [place] states she has hive like reaction from medication.       Encounter Date: Apr 25, 2017    Dermatology Problem List  1. S/p liver transplant for PBC (2002)   - current: sirolimus, prednisone   2. Pruritic papular skin eruption-self-induced, resolved  3. Mild atopic dermatitis- hands, thumbs, abdomen, back   - previous: Amlactin, patient discontinued   4. Actinic keratoses- right temple, left cheek, s/p LN2  5. onychomycosis-  Oral voriconazole, prescribed by Dr. Montero ()   6. Drug hypersensitivity reaction- fluconazole  - residual lesions of abdomen and chest   7. Seborrheic keratoses- back, under breasts   8. Scabbed lesion of R tragus      History of Present Illness  Luz Thompson is a 67 year old female with a [facility] liver transplant for PBC presenting today for a follow up skin check and to discuss multiple skin concerns.     Patient was last seen 7/18/16 for a general skin check. Today she reports that she was hospitalized for \"Valley Fever\" Coccidioides in [place] in January of this year and was discharged on fluconazole to which she had a hypersensitivity reaction with what she describes as a global \"hive\" drug eruption, worst over her chest, abdomen, pubic region, back, as well as arms, and posterior legs. This continued for 2 months before diflucan was discontinued and started on voriconazole. Patient reports that \"rash is mostly gone\" with the exception of lingering pink " "peeling skin lesions where wheals were located previously. Uses Cetaphil skin products.    She has some \"itchy bumps that come and go\" on her abdomen and back which she is not sure when they appeared. Says she gets some relief with Cerave anti-itch product.    Also reports some \"warty-looking bumps\" under her breasts which are not symptomatic in and of themselves but bother her cosmetically and occasionally catch on her bras and become irritated.    The lesion most concerning to the patient is a pruritic scaly rash on her right foot which cleared with mometasone cream but has recurred.She feels it cleared completely     She has no history of skin cancer or DN. Has had AK treated with LN in the past.       Nursing Notes:   Rosy Williamson CMA  4/25/2017  1:55 PM  Signed  Dermatology Rooming Note    Luz Thompson's goals for this visit include:   Chief Complaint   Patient presents with     Derm Problem     [place] states that she has a spot on her foot that is scaly and bleeds when washing.     Derm Problem     [place] states she has hive like reaction from medication.     Rosy Williamson CMA    Detailed Review of Systems  Otherwise [facility]. No other skin concerns.    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     Diagnostic skin and sensitization tests NOT ALLERGIC TO CORN/MILK PER IGE TESTS    10/25/12 IgE tests for corn/milk NEGATIVE     Diverticulosis of sigmoid colon 12/21/2013     GERD (gastroesophageal reflux disease)      H/O esophageal varices      H/O liver transplant (H) 2002    due to primary biliary cirrhosis     Heart murmur 4/4/2012     Hyperlipidemia 4/10/2012    Says that she does not have it anymore, not on meds     Hypocalcemia      MEDIAL " "MENISCUS TEAR - right 8/2/2012     Migraines 4/4/2012     Osteoarthritis of right knee 8/2/2012     Osteoporosis 4/20/2012     Papillary thyroid carcinoma (H)      Post-surgical hypothyroidism      Primary biliary cirrhosis (H)     s/p Liver transplant, 8521-4066     Sjogren's syndrome (H)      Thyroid cancer (H)      Unspecified cerebral artery occlusion with cerebral infarction 2001    when BP was very low, small multiple infacts in frontal lobe, had \"visual field cut,\" leg weakness, and expressive aphasia - all have resolved.      Unspecified nonsenile cataract      Vitamin D deficiency 10/1/2012     VRE (vancomycin-resistant Enterococci)        Medications  Current Outpatient Prescriptions   Medication     aspirin 81 MG tablet     RAPAMUNE 0.5 MG PO TABLET     voriconazole (VFEND) 200 MG tablet     losartan (COZAAR) 25 MG tablet     ursodiol (ACTIGALL) 250 MG tablet     Wheat Dextrin (BENEFIBER) POWD     SUMAtriptan (IMITREX) 50 MG tablet     meclizine (ANTIVERT) 25 MG tablet     levothyroxine (SYNTHROID, LEVOTHROID) 150 MCG tablet     folic acid (FOLVITE) 1 MG tablet     furosemide (LASIX) 20 MG tablet     predniSONE (DELTASONE) 5 MG tablet     STATIN NOT PRESCRIBED, INTENTIONAL,     Multiple Vitamins-Minerals (PRESERVISION AREDS 2) CAPS     calcitRIOL (ROCALTROL) 0.25 MCG capsule     Hypromellose (ARTIFICIAL TEARS OP)     acetaminophen 500 MG CAPS     magnesium 250 MG tablet     aspirin 81 MG EC tablet     fluticasone-vilanterol (BREO ELLIPTA) 100-25 MCG/INH oral inhaler     albuterol (ALBUTEROL) 108 (90 BASE) MCG/ACT Inhaler     clotrimazole (LOTRIMIN) 1 % cream     triamcinolone (KENALOG) 0.1 % cream     No current facility-administered medications for this visit.      Allergies  Allergies   Allergen Reactions     Benadryl [Diphenhydramine Hcl]      Insomnia      Cefpodoxime Itching     Cellcept Diarrhea     Ciprofloxacin Other (See Comments)     Insomnia, mood lability     Codeine      Psych disturbance     " "Diphenhydramine Other (See Comments)     Doxycycline      Lansoprazole Diarrhea     Levaquin [Levofloxacin] Other (See Comments)     Headache, hyperactivity     Lisinopril Cough     Methotrexate      Sores     Morphine Itching     Morphine Sulfate Itching     Mycophenolate Diarrhea     Pepcid Diarrhea     Prevacid Diarrhea     Ranitidine Diarrhea     Zantac Diarrhea     Cephalexin Rash     Fever and skin burning     Penicillin G Itching and Rash     Penicillins Rash     Tolectin [Nsaids] Rash     Tolmetin Rash     Tramadol Rash       Social History  Social History     Social History     Marital status:      Spouse name: Robbin Thompson     Number of children: 4     Years of education: 20     Occupational History     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 She and her  are traveling around the [place] in an RV. They are planning to be in [place] for the winter.        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   Intermittent use of sun protection.       Family History  Family History   Problem Relation Age of Onset     Hypertension Mother      Lipids Mother      Prostate Cancer Father      Macular Degeneration Father      Cancer - colorectal Maternal Grandmother      in her 80's, has surgery and removal of part of kidney,  at age 98     Colon Cancer Maternal Grandmother      Eye Disorder Maternal " "Grandfather      Glaucoma     HEART DISEASE Maternal Grandfather       at 98     Glaucoma Maternal Grandfather      CEREBROVASCULAR DISEASE Paternal Grandmother      in her 80's     Hypertension Paternal Grandmother      HEART DISEASE Paternal Grandfather      MI     Allergies Son      Neurologic Disorder Daughter      Migraines     Anesthesia Reaction No family hx of      Crohn Disease No family hx of      Ulcerative Colitis No family hx of    maternal grandfather had skin cancer, but patient is not aware what kind       Physical Exam  There were no vitals taken for this visit.  GEN: NAD, alert and appropriately responsive, pleasant mood  SKIN:   -Full skin, which includes the head/face, both arms, chest, back, abdomen,both legs, genitalia and/or groin buttocks, digits and/or nails, was examined.  - There are erythematous macules with overyling adherent scale on the face, one on the R temple and cheek and one on the L temple   - multiple 2-4mm brown macules and flat papules scattered throughout the body, clinically normal.  -There are pink scaly patches and plaques on the dorsal hands and flexural wrists and scabbed lesions with peeling skin on the bilateral thumbs around the nail cuticles to the DIP  -scattered pink scaling plaques on the abdomen and back   -There is an approximately ovoid pink hyperkeratotic plaque on the dorsum of the right foot with scaling.  -There are multiple light pink macules which in some locations become confluent patch with overlying very minimal erosion where patient indicates previous \"hives\" were located on abdomen and back, many in skin folds   -scabbed 3mm erosion on right tragus  - no other lesions of concern were noted in areas examined     Assessment & Plan:  1. Drug hypersensitivity eruption, likely related to fluconazole given temporal association. Clearing with discontinuation of medication and change to voriconazole.   -fluconazole added to allergy list     2. Actinic " keratoses- face: R temple, under eye bilaterally  - Cryotherapy procedure note: After verbal consent and discussion of risks and benefits including but no limited to dyspigmentation/scar, blister, and pain, 3 was(were) treated with 1-2mm freeze border for 2 cycles with liquid nitrogen. Post cryotherapy instructions were provided.   -reviewed sun protection recommendations with patient and answered questions   3. Hand dermatitis  - hands, patient prefers to use emollients at this time, continues to use Cetaphil skin products with some benefit        4. Dermatitis, favor atopic dermatitis vs irritant contact dermatitis given response to steroid and symptomatic atopic dermatitis in other locations. Patient does not recall any trauma or new exposures.   - May continue to use mometasone until runs out, then apply  0.1% Triamcinolone cream BID for 1 week to lesion on foot (80g)     5. Irritated Seborrheic keratoses- back, under breasts  -cryotherapy to irritated appearing lesion on back  Cryotherapy procedure note: After verbal consent and discussion of risks and benefits including but no limited to dyspigmentation/scar, blister, and pain, 1 was(were) treated with 1-2mm freeze border for 2 cycles with liquid nitrogen. Post cryotherapy instructions were provided.   - patient desired cryotherapy to lesions under breasts which are irritated, but will defer to next appointment   6. Scabbed lesion of R tragus- although patient attributes to irritation from hearing aids, lesion is not in an area which would receive friction from hearing aid. Some concern for skin cancer, however given short time present (1 week) could also be acute injury which patient does not recall.   - reassess at follow up for healing, may consider bx at that time     7. Multiple clinically normal appearing nevi, angiomas, lentigines. Pt reassured of benign nature of these. Recommend yearly exam given history of transplant.    Follow-up in 1 month or  sooner if concerns.     Scribed by Alix Caro, Medical Student       The Patient was seen in Dermatology Clinic by MAVIS BERMUDEZ.  I performed the history, examination, and procedures noted above with the student.  I have reviewed the history & exam as outlined in this note and made edits where appropriate. The assessment and plan were made by me.    Mavis Bermudez MD, PhD

## 2017-04-25 NOTE — PROGRESS NOTES
"[facility] Dermatology Note    Last Visit: 7/18/16    CC:   Chief Complaint   Patient presents with     Derm Problem     [place] states that she has a spot on her foot that is scaly and bleeds when washing.     Derm Problem     [place] states she has hive like reaction from medication.       Encounter Date: Apr 25, 2017    Dermatology Problem List  1. S/p liver transplant for PBC (2002)   - current: sirolimus, prednisone   2. Pruritic papular skin eruption-self-induced, resolved  3. Mild atopic dermatitis- hands, thumbs, abdomen, back   - previous: Amlactin, patient discontinued   4. Actinic keratoses- right temple, left cheek, s/p LN2  5. onychomycosis-  Oral voriconazole, prescribed by Dr. Montero ()   6. Drug hypersensitivity reaction- fluconazole  - residual lesions of abdomen and chest   7. Seborrheic keratoses- back, under breasts   8. Scabbed lesion of R tragus      History of Present Illness  Luz Thompson is a 67 year old female with a [facility] liver transplant for PBC presenting today for a follow up skin check and to discuss multiple skin concerns.     Patient was last seen 7/18/16 for a general skin check. Today she reports that she was hospitalized for \"Valley Fever\" Coccidioides in [place] in January of this year and was discharged on fluconazole to which she had a hypersensitivity reaction with what she describes as a global \"hive\" drug eruption, worst over her chest, abdomen, pubic region, back, as well as arms, and posterior legs. This continued for 2 months before diflucan was discontinued and started on voriconazole. Patient reports that \"rash is mostly gone\" with the exception of lingering pink peeling skin lesions where wheals were located previously. Uses Cetaphil skin products.    She has some \"itchy bumps that come and go\" on her abdomen and back which she is not sure when they appeared. Says she gets some relief with Cerave anti-itch product.    Also reports some " "\"warty-looking bumps\" under her breasts which are not symptomatic in and of themselves but bother her cosmetically and occasionally catch on her bras and become irritated.    The lesion most concerning to the patient is a pruritic scaly rash on her right foot which cleared with mometasone cream but has recurred.She feels it cleared completely     She has no history of skin cancer or DN. Has had AK treated with LN in the past.       Nursing Notes:   Rosy Williamson CMA  4/25/2017  1:55 PM  Signed  Dermatology Rooming Note    Luz Thompson's goals for this visit include:   Chief Complaint   Patient presents with     Derm Problem     [place] states that she has a spot on her foot that is scaly and bleeds when washing.     Derm Problem     [place] states she has hive like reaction from medication.     Rosy Williamson CMA    Detailed Review of Systems  Otherwise [facility]. No other skin concerns.    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     Diagnostic skin and sensitization tests NOT ALLERGIC TO CORN/MILK PER IGE TESTS    10/25/12 IgE tests for corn/milk NEGATIVE     Diverticulosis of sigmoid colon 12/21/2013     GERD (gastroesophageal reflux disease)      H/O esophageal varices      H/O liver transplant (H) 2002    due to primary biliary cirrhosis     Heart murmur 4/4/2012     Hyperlipidemia 4/10/2012    Says that she does not have it anymore, not on meds     Hypocalcemia      MEDIAL MENISCUS TEAR - right 8/2/2012     Migraines 4/4/2012     Osteoarthritis of right knee 8/2/2012     Osteoporosis 4/20/2012     Papillary thyroid carcinoma (H)      Post-surgical hypothyroidism      Primary biliary cirrhosis (H)     s/p Liver transplant, [id number]     Sjogren's syndrome " "(H)      Thyroid cancer (H)      Unspecified cerebral artery occlusion with cerebral infarction 2001    when BP was very low, small multiple infacts in frontal lobe, had \"visual field cut,\" leg weakness, and expressive aphasia - all have resolved.      Unspecified nonsenile cataract      Vitamin D deficiency 10/1/2012     VRE (vancomycin-resistant Enterococci)        Medications  Current Outpatient Prescriptions   Medication     aspirin 81 MG tablet     RAPAMUNE 0.5 MG PO TABLET     voriconazole (VFEND) 200 MG tablet     losartan (COZAAR) 25 MG tablet     ursodiol (ACTIGALL) 250 MG tablet     Wheat Dextrin (BENEFIBER) POWD     SUMAtriptan (IMITREX) 50 MG tablet     meclizine (ANTIVERT) 25 MG tablet     levothyroxine (SYNTHROID, LEVOTHROID) 150 MCG tablet     folic acid (FOLVITE) 1 MG tablet     furosemide (LASIX) 20 MG tablet     predniSONE (DELTASONE) 5 MG tablet     STATIN NOT PRESCRIBED, INTENTIONAL,     Multiple Vitamins-Minerals (PRESERVISION AREDS 2) CAPS     calcitRIOL (ROCALTROL) 0.25 MCG capsule     Hypromellose (ARTIFICIAL TEARS OP)     acetaminophen 500 MG CAPS     magnesium 250 MG tablet     aspirin 81 MG EC tablet     fluticasone-vilanterol (BREO ELLIPTA) 100-25 MCG/INH oral inhaler     albuterol (ALBUTEROL) 108 (90 BASE) MCG/ACT Inhaler     clotrimazole (LOTRIMIN) 1 % cream     triamcinolone (KENALOG) 0.1 % cream     No current facility-administered medications for this visit.      Allergies  Allergies   Allergen Reactions     Benadryl [Diphenhydramine Hcl]      Insomnia      Cefpodoxime Itching     Cellcept Diarrhea     Ciprofloxacin Other (See Comments)     Insomnia, mood lability     Codeine      Psych disturbance     Diphenhydramine Other (See Comments)     Doxycycline      Lansoprazole Diarrhea     Levaquin [Levofloxacin] Other (See Comments)     Headache, hyperactivity     Lisinopril Cough     Methotrexate      Sores     Morphine Itching     Morphine Sulfate Itching     Mycophenolate Diarrhea     " "Pepcid Diarrhea     Prevacid Diarrhea     Ranitidine Diarrhea     Zantac Diarrhea     Cephalexin Rash     Fever and skin burning     Penicillin G Itching and Rash     Penicillins Rash     Tolectin [Nsaids] Rash     Tolmetin Rash     Tramadol Rash       Social History  Social History     Social History     Marital status:      Spouse name: Robbin Thompson     Number of children: 4     Years of education: 20     Occupational History     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 She and her  are traveling around the [place] in an RV. They are planning to be in [place] for the winter.        [place] hogs, cows, corn and soybean crops.   Intermittent use of sun protection.       Family History  Family History   Problem Relation Age of Onset     Hypertension Mother      Lipids Mother      Prostate Cancer Father      Macular Degeneration Father      Cancer - colorectal Maternal Grandmother      in her 80's, has surgery and removal of part of kidney,  at age 98     Colon Cancer Maternal Grandmother      Eye Disorder Maternal Grandfather      Glaucoma     HEART DISEASE Maternal Grandfather       at 98     Glaucoma Maternal Grandfather      CEREBROVASCULAR DISEASE Paternal Grandmother      in her 80's     Hypertension Paternal Grandmother      HEART DISEASE Paternal Grandfather      MI     Allergies Son      " "Neurologic Disorder Daughter      Migraines     Anesthesia Reaction No family hx of      Crohn Disease No family hx of      Ulcerative Colitis No family hx of    maternal grandfather had skin cancer, but patient is not aware what kind       Physical Exam  There were no vitals taken for this visit.  GEN: NAD, alert and appropriately responsive, pleasant mood  SKIN:   -Full skin, which includes the head/face, both arms, chest, back, abdomen,both legs, genitalia and/or groin buttocks, digits and/or nails, was examined.  - There are erythematous macules with overyling adherent scale on the face, one on the R temple and cheek and one on the L temple   - multiple 2-4mm brown macules and flat papules scattered throughout the body, clinically normal.  -There are pink scaly patches and plaques on the dorsal hands and flexural wrists and scabbed lesions with peeling skin on the bilateral thumbs around the nail cuticles to the DIP  -scattered pink scaling plaques on the abdomen and back   -There is an approximately ovoid pink hyperkeratotic plaque on the dorsum of the right foot with scaling.  -There are multiple light pink macules which in some locations become confluent patch with overlying very minimal erosion where patient indicates previous \"hives\" were located on abdomen and back, many in skin folds   -scabbed 3mm erosion on right tragus  - no other lesions of concern were noted in areas examined     Assessment & Plan:  1. Drug hypersensitivity eruption, likely related to fluconazole given temporal association. Clearing with discontinuation of medication and change to voriconazole.   -fluconazole added to allergy list     2. Actinic keratoses- face: R temple, under eye bilaterally  - Cryotherapy procedure note: After verbal consent and discussion of risks and benefits including but no limited to dyspigmentation/scar, blister, and pain, 3 was(were) treated with 1-2mm freeze border for 2 cycles with liquid nitrogen. Post " cryotherapy instructions were provided.   -reviewed sun protection recommendations with patient and answered questions   3. Hand dermatitis  - hands, patient prefers to use emollients at this time, continues to use Cetaphil skin products with some benefit        4. Dermatitis, favor atopic dermatitis vs irritant contact dermatitis given response to steroid and symptomatic atopic dermatitis in other locations. Patient does not recall any trauma or new exposures.   - May continue to use mometasone until runs out, then apply  0.1% Triamcinolone cream BID for 1 week to lesion on foot (80g)     5. Irritated Seborrheic keratoses- back, under breasts  -cryotherapy to irritated appearing lesion on back  Cryotherapy procedure note: After verbal consent and discussion of risks and benefits including but no limited to dyspigmentation/scar, blister, and pain, 1 was(were) treated with 1-2mm freeze border for 2 cycles with liquid nitrogen. Post cryotherapy instructions were provided.   - patient desired cryotherapy to lesions under breasts which are irritated, but will defer to next appointment   6. Scabbed lesion of R tragus- although patient attributes to irritation from hearing aids, lesion is not in an area which would receive friction from hearing aid. Some concern for skin cancer, however given short time present (1 week) could also be acute injury which patient does not recall.   - reassess at follow up for healing, may consider bx at that time     7. Multiple clinically normal appearing nevi, angiomas, lentigines. Pt reassured of benign nature of these. Recommend yearly exam given history of transplant.    Follow-up in 1 month or sooner if concerns.     Scribed by Alix Caro, Medical Student       The Patient was seen in Dermatology Clinic by HAI ROLDAN.  I performed the history, examination, and procedures noted above with the student.  I have reviewed the history & exam as outlined in this note and  made edits where appropriate. The assessment and plan were made by me.    Mavis Bermudez MD, PhD

## 2017-04-25 NOTE — MR AVS SNAPSHOT
After Visit Summary   4/25/2017    Luz Thompson    MRN: [id number]           Patient Information     Date Of Birth          1950        Visit Information        Provider Department      4/25/2017 1:30 PM Mavis Bermudez MD [facility]         Follow-ups after your visit        Your next 10 appointments already scheduled     May 02, 2017 12:00 PM CDT   [facility]    [address] [place] [id number]   [phone]            May 08, 2017  4:00 PM CDT   (Arrive by 3:45 PM)   ATRIAL FIBULATION VISIT with Graham Gilmore MD   [facility] ([facility])    [address] [place] [id number] [phone]            May 15, 2017 11:00 AM CDT   (Arrive by 10:30 AM)   RETURN ENDOCRINE with Rach Vasquez MD   [facility] ([facility])    [address] [place] [id number] [phone]            May 23, 2017 12:00 PM CDT   (Arrive by 11:45 AM)   Return Visit with Mavis Bermudez MD   [facility] ([facility])    [address] [place] [id number] [phone]            May 24, 2017 10:00 AM CDT   (Arrive by 9:30 AM)   Return Visit with Crystal Montero MD   [facility] ([facility])    [address] [place] [id number] [phone]            Jul 31, 2017 10:00 AM CDT   (Arrive by 9:45 AM)   CT CHEST W/O CONTRAST with UCCT1   [facility] [place] [facility] ([facility])    [address] [place] [id number] [phone]           Please bring any scans or X-rays taken at other hospitals, if similar tests were done. Also bring a  list of your medicines, including vitamins, minerals and over-the-counter drugs. It is safest to leave personal items at home.  Be sure to tell your doctor:   If you have any allergies.   If there s any chance you are pregnant.   If you are breastfeeding.   If you have any special needs.  You do not need to do anything special to prepare.  Please wear loose clothing, such as a sweat suit or jogging clothes. Avoid snaps, zippers and other metal. We may ask you to undress and put on a hospital gown.            Jul 31, 2017 10:30 AM CDT   (Arrive by 10:15 AM)   Return Visit with Eden Clinton MD   [facility] ([facility])    [address] [place] [id number] [phone]            Sep 19, 2017 11:15 AM CDT   (Arrive by 11:00 AM)   Return General Liver with Gentry Ramirez MD   [facility] ([facility])    [address] [place] [id number] [phone]              Future tests that were ordered for you today     Open Future Orders        Priority Expected Expires Ordered    CT Chest w/o Contrast Routine 7/23/2017 5/24/2018 4/24/2017            Who to contact     Please call your clinic at [phone] to:    Ask questions about your health    Make or cancel appointments    Discuss your medicines    Learn about your test results    Speak to your doctor   If you have compliments or concerns about an experience at your clinic, or if you wish to file a complaint, please contact [facility] Patient Relations at [phone] or email us at [email or web address]         Additional Information About Your Visit        MyChart Information     [employer] gives you secure access to your electronic health record. If you see a primary care provider, you can also send messages to your care team and make appointments. If you have questions, please call your  primary care clinic.  If you do not have a primary care provider, please call [phone] and they will assist you.      [employer] is an electronic gateway that provides easy, online access to your medical records. With [employer], you can request a clinic appointment, read your test results, renew a prescription or communicate with your care team.     To access your existing account, please contact your [facility] or call [phone] for assistance.        Care EveryWhere ID     This is your Care EveryWhere ID. This could be used by other organizations to access your [facility] medical records  [id number]         Blood Pressure from Last 3 Encounters:   04/24/17 154/84   04/19/17 160/83   09/21/16 161/77    Weight from Last 3 Encounters:   04/24/17 88.9 kg (196 lb)   04/19/17 89 kg (196 lb 3.2 oz)   09/21/16 91.1 kg (200 lb 14.4 oz)              Today, you had the following     No orders found for display       Primary Care Provider Office Phone # Fax #    Jennifer Amparo Barraza [phone]       [facility] [address]  [place] [address]        Thank you!     Thank you for choosing [facility]  for your care. Our goal is always to provide you with excellent care. Hearing back from our patients is one way we can continue to improve our services. Please take a few minutes to complete the written survey that you may receive in the mail after your visit with us. Thank you!             Your Updated Medication List - Protect others around you: Learn how to safely use, store and throw away your medicines at www.disposemymeds.org.          This list is accurate as of: 4/25/17  2:49 PM.  Always use your most recent med list.                   Brand Name Dispense Instructions for use    acetaminophen 500 MG Caps      Take 500 mg by mouth as needed Take 500-1,000 mg by mouth every 6 hours if needed.       albuterol 108 (90 BASE) MCG/ACT Inhaler    Generic drug:  albuterol      Inhale 2 puffs into the lungs every 4 hours as needed for shortness of breath / dyspnea or wheezing Reported on 4/25/2017       ARTIFICIAL TEARS OP      Apply 1 drop to eye as needed       * aspirin 81 MG tablet      Take 81 mg by mouth       * aspirin 81 MG EC tablet      Take 1 tablet by mouth daily.       BENEFIBER Powd      2 teaspoons daily       BREO ELLIPTA 100-25 MCG/INH oral inhaler   Generic drug:  fluticasone-vilanterol      Inhale 1 puff into the lungs daily Reported on 4/25/2017       calcitRIOL 0.25 MCG capsule    ROCALTROL    180 capsule    Take 2 tablets daily       clotrimazole 1 % cream    LOTRIMIN     Apply topically 2 times daily Reported on 4/25/2017       folic acid 1 MG tablet    FOLVITE    90 tablet    Take 1 tablet (1 mg) by mouth daily       furosemide 20 MG tablet    LASIX    90 tablet    Take 1 tablet (20 mg) by mouth every other day       levothyroxine 150 MCG tablet    SYNTHROID/LEVOTHROID    96 tablet    Take one tablet daily 6 days a week and one and a half tablets one day a week.       losartan 25 MG tablet    COZAAR    90 tablet    Take 1 tablet (25 mg) by mouth daily       magnesium 250 MG tablet      Take 2 tablets by mouth daily At night.       meclizine 25 MG tablet    ANTIVERT    30 tablet    Take 1 tablet (25 mg) by mouth as needed       predniSONE 5 MG tablet    DELTASONE    90 tablet    Take 1 tablet (5 mg) by mouth daily       PRESERVISION AREDS 2 Caps      Take 2 capsules by mouth daily       sirolimus Tabs tablet     15 tablet    Take 0.5 mg by mouth every other day       STATIN NOT PRESCRIBED (INTENTIONAL)     0 each    Statin not prescribed intentionally due to Intolerance (with supporting documentation of trying a statin at least once within the last 5 years)       SUMAtriptan 50 MG tablet    IMITREX    30 tablet    Take 1 tablet (50 mg) by mouth at onset of headache for migraine repeat after 2 hours if needed.       triamcinolone 0.1 %  cream    KENALOG    80 g    Apply topically 2 times daily Apply to rash       ursodiol 250 MG tablet    ACTIGALL    180 tablet    Take 1 tablet (250 mg) by mouth 2 times daily       voriconazole 200 MG tablet    VFEND     Take 1 tablet (200 mg) by mouth 2 times daily       * Notice:  This list has 2 medication(s) that are the same as other medications prescribed for you. Read the directions carefully, and ask your doctor or other care provider to review them with you.

## 2017-04-27 ENCOUNTER — TELEPHONE (OUTPATIENT)
Dept: DERMATOLOGY | Facility: CLINIC | Age: 67
End: 2017-04-27

## 2017-04-27 DIAGNOSIS — L30.9 ECZEMA, UNSPECIFIED TYPE: Primary | ICD-10-CM

## 2017-04-27 RX ORDER — TRIAMCINOLONE ACETONIDE 1 MG/G
CREAM TOPICAL
Qty: 80 G | Refills: 0 | Status: SHIPPED | OUTPATIENT
Start: 2017-04-27 | End: 2017-06-13

## 2017-04-27 NOTE — TELEPHONE ENCOUNTER
Reviewed notes and patient was prescribed triamcinolone 0.1% cream BID for dermatitis on the foot. Rx prescribed. She is scheduled to f/u in 1 month with Dr. Masterson.      Glendy Kincaid MD  Medicine/Dermatology, PGY-4  [facility]

## 2017-04-27 NOTE — TELEPHONE ENCOUNTER
"Patient calling wondering what Rx was send to [facility]. No new Rx seen under medication list.   Unable to see last office visit note.  Per patient \"it's a new Rx steroid that would go on body... I think that's what it was\"     She's already using mometasone (ELOCON) 0.1 % cream to be used for 14 days.       Will route to MD.     Sonam Alvarado RN         "

## 2017-04-28 ENCOUNTER — CARE COORDINATION (OUTPATIENT)
Dept: PULMONOLOGY | Facility: CLINIC | Age: 67
End: 2017-04-28

## 2017-04-28 NOTE — PROGRESS NOTES
Patient called clinic to state that she has been experiencing more SOB after stopping her inhalers as recommended in last office visit with Dr. Clinton. She is using her rescue inhaler more frequently. She wanted to know if she could go back on her Breo inhaler. Writer contacted Dr. Clinton who stated that patient could go back on her inhalers and she will follow up with her at a later date to discuss a plan to come off the inhalers. Writer relayed this information to the patient via phone who verbalized understanding of this plan.

## 2017-05-02 ENCOUNTER — OFFICE VISIT (OUTPATIENT)
Dept: OTOLARYNGOLOGY | Facility: CLINIC | Age: 67
End: 2017-05-02
Payer: MEDICARE

## 2017-05-02 VITALS — RESPIRATION RATE: 12 BRPM | BODY MASS INDEX: 30.61 KG/M2 | WEIGHT: 195 LBS | HEIGHT: 67 IN

## 2017-05-02 DIAGNOSIS — H61.23 BILATERAL IMPACTED CERUMEN: Primary | ICD-10-CM

## 2017-05-02 DIAGNOSIS — H90.5 SNHL (SENSORINEURAL HEARING LOSS): ICD-10-CM

## 2017-05-02 PROCEDURE — 69210 REMOVE IMPACTED EAR WAX UNI: CPT | Performed by: OTOLARYNGOLOGY

## 2017-05-02 PROCEDURE — 99213 OFFICE O/P EST LOW 20 MIN: CPT | Mod: 25 | Performed by: OTOLARYNGOLOGY

## 2017-05-02 NOTE — NURSING NOTE
"Chief Complaint   Patient presents with     RECHECK     recheck ears and clean them       Initial Resp 12  Ht 1.702 m (5' 7\")  Wt 88.5 kg (195 lb)  BMI 30.54 kg/m2 Estimated body mass index is 30.54 kg/(m^2) as calculated from the following:    Height as of this encounter: 1.702 m (5' 7\").    Weight as of this encounter: 88.5 kg (195 lb).  Medication Reconciliation: complete     Ranulfo Schmitz CMA      "

## 2017-05-02 NOTE — PROGRESS NOTES
History of Present Illness - Luz Thompson is a 66 year old female last seen 7/15/2016.      To review, she has had LEFT ear surgery with a tympanoplasty in 1996.  Otherwise no chronic ear disease.  She has also had thyroid surgery and ablation in March 2010.  The main reason she is here is progressive bilateral ear fullness and blockage of her hearing aids with cerumen.    At the March 2014 visit, she had been through quite an ordeal, having been hospitalized for E. Coli sepsis, the source was unknown. No new issues with that problem since then. And after asking her about it, it happened again this past June of 2015.    Her past year has been fine, and other than fullness from her cerumen build up, no new ENT issues. No drainage from the ears, no bleeding, no vertigo. She still uses bilateral hearing aids.      Past Medical History -   Patient Active Problem List   Diagnosis     Anemia of other chronic disease     Liver replaced by transplant (H)     Heart murmur     Primary biliary cirrhosis (H)     CKD (chronic kidney disease) stage 3, GFR 30-59 ml/min     GERD (gastroesophageal reflux disease)     Bladder infection, chronic     Osteoporosis     MEDIAL MENISCUS TEAR - right     Osteoarthritis of right knee     Postablative hypothyroidism     Hyperlipidemia with target LDL less than 100     History of thyroid cancer     Cerebral artery occlusion with cerebral infarction (H)     HDL deficiency     Advanced directives, counseling/discussion     Vitamin D deficiency     Diagnostic skin and sensitization tests     Papillary carcinoma of thyroid (H)     Anemia, iron deficiency     S/P tympanoplasty     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     Hypertension goal BP (blood pressure) < 140/80     Status post cataract surgery     Urinary frequency     Diverticulosis of sigmoid colon     Aftercare following organ transplant     Prophylactic antibiotic     Syncope, vasovagal     Slow transit constipation     High risk medication use     Statin  intolerance     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     Trochanteric bursitis of left hip     Klebsiella infection     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)     Coccidioidomycosis     Coccidioidal pneumonitis (H)       Current Medications -   Current Outpatient Prescriptions:      triamcinolone (KENALOG) 0.1 % cream, Apply cream twice daily to foot, Disp: 80 g, Rfl: 0     aspirin 81 MG tablet, Take 81 mg by mouth, Disp: , Rfl:      RAPAMUNE 0.5 MG PO TABLET, Take 0.5 mg by mouth every other day, Disp: 15 tablet, Rfl: 11     voriconazole (VFEND) 200 MG tablet, Take 1 tablet (200 mg) by mouth 2 times daily, Disp: , Rfl:      fluticasone-vilanterol (BREO ELLIPTA) 100-25 MCG/INH oral inhaler, Inhale 1 puff into the lungs daily Reported on 4/25/2017, Disp: , Rfl:      albuterol (ALBUTEROL) 108 (90 BASE) MCG/ACT Inhaler, Inhale 2 puffs into the lungs every 4 hours as needed for shortness of breath / dyspnea or wheezing Reported on 4/25/2017, Disp: , Rfl:      clotrimazole (LOTRIMIN) 1 % cream, Apply topically 2 times daily Reported on 4/25/2017, Disp: , Rfl:      losartan (COZAAR) 25 MG tablet, Take 1 tablet (25 mg) by mouth daily, Disp: 90 tablet, Rfl: 3     ursodiol (ACTIGALL) 250 MG tablet, Take 1 tablet (250 mg) by mouth 2 times daily, Disp: 180 tablet, Rfl: 3     Wheat Dextrin (BENEFIBER) POWD, 2 teaspoons daily, Disp: , Rfl:      SUMAtriptan (IMITREX) 50 MG tablet, Take 1 tablet (50 mg) by mouth at onset of headache for migraine repeat after 2 hours if needed., Disp: 30 tablet, Rfl: 3     meclizine (ANTIVERT) 25 MG tablet, Take 1 tablet (25 mg) by mouth as needed, Disp: 30 tablet, Rfl: 11     levothyroxine (SYNTHROID, LEVOTHROID) 150 MCG tablet, Take one tablet daily 6 days a week and one and a half tablets one day a week., Disp: 96 tablet, Rfl: 3     folic acid (FOLVITE) 1 MG tablet, Take 1 tablet (1 mg) by mouth daily, Disp: 90 tablet, Rfl: 3     furosemide (LASIX) 20 MG tablet, Take 1 tablet (20  mg) by mouth every other day, Disp: 90 tablet, Rfl: 3     predniSONE (DELTASONE) 5 MG tablet, Take 1 tablet (5 mg) by mouth daily, Disp: 90 tablet, Rfl: 3     STATIN NOT PRESCRIBED, INTENTIONAL,, Statin not prescribed intentionally due to Intolerance (with supporting documentation of trying a statin at least once within the last 5 years), Disp: 0 each, Rfl: 0     Multiple Vitamins-Minerals (PRESERVISION AREDS 2) CAPS, Take 2 capsules by mouth daily, Disp: , Rfl:      calcitRIOL (ROCALTROL) 0.25 MCG capsule, Take 2 tablets daily, Disp: 180 capsule, Rfl: 3     triamcinolone (KENALOG) 0.1 % cream, Apply topically 2 times daily Apply to rash (Patient not taking: Reported on 4/25/2017), Disp: 80 g, Rfl: 3     Hypromellose (ARTIFICIAL TEARS OP), Apply 1 drop to eye as needed, Disp: , Rfl:      acetaminophen 500 MG CAPS, Take 500 mg by mouth as needed Take 500-1,000 mg by mouth every 6 hours if needed., Disp: , Rfl:      magnesium 250 MG tablet, Take 2 tablets by mouth daily At night. , Disp: , Rfl:      aspirin 81 MG EC tablet, Take 1 tablet by mouth daily., Disp: , Rfl:     Allergies -   Allergies   Allergen Reactions     Fluconazole Hives and Itching     Benadryl [Diphenhydramine Hcl]      Insomnia      Cefpodoxime Itching     Cellcept Diarrhea     Ciprofloxacin Other (See Comments)     Insomnia, mood lability     Codeine      Psych disturbance     Diphenhydramine Other (See Comments)     Doxycycline      Lansoprazole Diarrhea     Levaquin [Levofloxacin] Other (See Comments)     Headache, hyperactivity     Lisinopril Cough     Methotrexate      Sores     Morphine Itching     Morphine Sulfate Itching     Mycophenolate Diarrhea     Pepcid Diarrhea     Prevacid Diarrhea     Ranitidine Diarrhea     Zantac Diarrhea     Cephalexin Rash     Fever and skin burning     Penicillin G Itching and Rash     Penicillins Rash     Tolectin [Nsaids] Rash     Tolmetin Rash     Tramadol Rash       Social History -   History     Social  "History     Marital Status:      Spouse Name: Robbin Thompson     Number of Children: 4     Years of Education: 20     Occupational History     Guardian Conservator  [employer]      Self     Social History Main Topics     Smoking status: Former Smoker -- 1.00 packs/day for 18 years     Types: Cigarettes     Quit date: 1985     Smokeless tobacco: Never Used     Alcohol Use: Yes      Comment: rare - \"I toast at weddings\"     Drug Use: No     Sexual Activity:     Partners: Male     Birth Control/ Protection: Post-menopausal     Other Topics Concern     Exercise Yes     cardio and strengthing     Social History Narrative    She is retired. She and her  are traveling around the [place] in an RV. They are planning to be in [place] for the winter.       Family History -   Family History   Problem Relation Age of Onset     Hypertension Mother      Lipids Mother      Prostate Cancer Father      Macular Degeneration Father      Cancer - colorectal Maternal Grandmother      in her 80's, has surgery and removal of part of kidney,  at age 98     Colon Cancer Maternal Grandmother      Eye Disorder Maternal Grandfather      Glaucoma     HEART DISEASE Maternal Grandfather       at 98     Glaucoma Maternal Grandfather      CEREBROVASCULAR DISEASE Paternal Grandmother      in her 80's     Hypertension Paternal Grandmother      HEART DISEASE Paternal Grandfather      MI     Allergies Son      Neurologic Disorder Daughter      Migraines     Anesthesia Reaction No family hx of      Crohn Disease No family hx of      Ulcerative Colitis No family hx of        Review of Systems - As per HPI and PMHx, otherwise 7 system review of the head and neck negative.    Physical Exam  Resp 12  Ht 1.702 m (5' 7\")  Wt 88.5 kg (195 lb)  BMI 30.54 kg/m2    General - The patient is well nourished and well developed, and appears to have good nutritional status.  Alert and oriented to person and " place, answers questions and cooperates with examination appropriately.   Head and Face - Normocephalic and atraumatic, with no gross asymmetry noted of the contour of the facial features.  The facial nerve is intact, with strong symmetric movements.  Voice and Breathing - The patient was breathing comfortably without the use of accessory muscles. There was no wheezing, stridor, or stertor.  The patients voice was clear and strong, and had appropriate pitch and quality.  Eyes - Extraocular movements intact, and the pupils were reactive to light.  Sclera were not icteric or injected, conjunctiva were pink and moist.  Mouth - Examination of the oral cavity showed pink, healthy oral mucosa. No lesions or ulcerations noted.  The tongue was mobile and midline, and the dentition were in good condition.    Throat - The walls of the oropharynx were smooth, pink, moist, symmetric, and had no lesions or ulcerations.  The tonsillar pillars and soft palate were symmetric.  The uvula was midline on elevation.    Neck - Normal midline excursion of the laryngotracheal complex during swallowing.  Full range of motion on passive movement.  Palpation of the occipital, submental, submandibular, internal jugular chain, and supraclavicular nodes did not demonstrate any abnormal lymph nodes or masses.     Cerumen Removal    Physical Exam and Procedure  Ears - On examination of the ears, I found that the RT ear was completely impacted with dense cerumen.  Therefore, I positioned them in the examination chair in a semi-supine position, beginning with the right side.  I used the binocular surgical microscope to perform cerumen removal.  I began by using a cerumen loop to gently lift the edges of the cerumen mass away from the walls of the external canal.  Once I did this, I was able to suction away fragments of wax and debris using suction.  Once the mass was loose enough, the entire plug was pulled from the canal.  The tympanic membrane  was intact, no sign of perforation or middle ear effusion.    I turned my attention to the contralateral side the LEFT  tympanic membrane was intact, no sign of perforation or middle ear effusion.      A/P - Luz Thompson is a 65 year old female  (H90.5) SNHL (sensorineural hearing loss)  (primary encounter diagnosis)  (H61.23) Bilateral impacted cerumen    The patient has had their cerumen procedurally removed today.  I have discussed ear care at home, including avoiding qtips or any other object placed in the canal.  I have also discussed that over the counter cerumen kits like Debrox or Cerumenex can be useful.    If no other issues, follow up with me in one year for an ear check.

## 2017-05-02 NOTE — PATIENT INSTRUCTIONS
Scheduling Information  To schedule your CT/MRI scan, please contact [facility] at [phone] OR [facility] at [phone]    To schedule your Surgery, please contact our Specialty Schedulers at [phone]      ENT Clinic Locations Clinic Hours Telephone Number     [facility]  [address] [place] [address]   Monday:           1:00pm -- 5:00pm    Friday:              8:00am - 12:00pm   To schedule/reschedule an appointment with   Dr. Mejia,   please contact our   Specialty Scheduling Department at:     [phone]       [facility]  [address] [place] [address] Tuesday:          8:00am -- 2:00pm         Urgent Care Locations Clinic Hours Telephone Numbers     [facility]  [address] [place] [address]     Monday-Friday:     11:00am - 9:00pm    Saturday-Sunday:  9:00am - 5:00pm   [phone]     [facility]  [address] [place] [address]     Monday-Friday:      5:00pm - 9:00pm     Saturday-Sunday:  9:00am - 5:00pm   [phone]

## 2017-05-03 ASSESSMENT — ENCOUNTER SYMPTOMS
JOINT SWELLING: 0
SYNCOPE: 0
INSOMNIA: 0
PANIC: 0
MYALGIAS: 1
MUSCLE WEAKNESS: 0
RESPIRATORY PAIN: 0
HYPERTENSION: 1
HYPOTENSION: 0
INCREASED ENERGY: 1
COUGH: 1
DECREASED APPETITE: 0
FATIGUE: 1
WHEEZING: 0
LEG PAIN: 0
DECREASED CONCENTRATION: 0
NECK PAIN: 0
COUGH DISTURBING SLEEP: 0
ORTHOPNEA: 0
PALPITATIONS: 1
POLYPHAGIA: 0
MUSCLE CRAMPS: 0
STIFFNESS: 1
TACHYCARDIA: 1
CHILLS: 0
INCREASED ENERGY: 1
SYNCOPE: 0
SLEEP DISTURBANCES DUE TO BREATHING: 0
SHORTNESS OF BREATH: 1
WHEEZING: 0
DYSPNEA ON EXERTION: 1
NERVOUS/ANXIOUS: 1
SHORTNESS OF BREATH: 1
LIGHT-HEADEDNESS: 0
HYPERTENSION: 1
HALLUCINATIONS: 0
FATIGUE: 1
DECREASED CONCENTRATION: 0
LEG SWELLING: 1
PANIC: 0
STIFFNESS: 1
CLAUDICATION: 0
MUSCLE CRAMPS: 0
SNORES LOUDLY: 1
FEVER: 0
WEIGHT GAIN: 0
CHILLS: 0
NERVOUS/ANXIOUS: 1
LEG SWELLING: 1
NIGHT SWEATS: 1
MUSCLE WEAKNESS: 0
RESPIRATORY PAIN: 0
ORTHOPNEA: 0
DYSPNEA ON EXERTION: 1
ALTERED TEMPERATURE REGULATION: 1
BACK PAIN: 1
NECK PAIN: 0
HEMOPTYSIS: 0
ARTHRALGIAS: 1
HEMOPTYSIS: 0
POLYPHAGIA: 0
SPUTUM PRODUCTION: 0
POSTURAL DYSPNEA: 0
DEPRESSION: 0
LEG PAIN: 0
HYPOTENSION: 0
TACHYCARDIA: 1
EXERCISE INTOLERANCE: 0
COUGH DISTURBING SLEEP: 0
POLYDIPSIA: 0
FEVER: 0
SPUTUM PRODUCTION: 0
MYALGIAS: 1
BACK PAIN: 1
WEIGHT GAIN: 0
NIGHT SWEATS: 1
INSOMNIA: 0
JOINT SWELLING: 0
CLAUDICATION: 0
LIGHT-HEADEDNESS: 0
POLYDIPSIA: 0
WEIGHT LOSS: 0
SNORES LOUDLY: 1
ALTERED TEMPERATURE REGULATION: 1
ARTHRALGIAS: 1
DEPRESSION: 0
EXERCISE INTOLERANCE: 0
SLEEP DISTURBANCES DUE TO BREATHING: 0
DECREASED APPETITE: 0
WEIGHT LOSS: 0
HALLUCINATIONS: 0
COUGH: 1
POSTURAL DYSPNEA: 0
PALPITATIONS: 1

## 2017-05-05 ENCOUNTER — TELEPHONE (OUTPATIENT)
Dept: TRANSPLANT | Facility: CLINIC | Age: 67
End: 2017-05-05

## 2017-05-05 DIAGNOSIS — Z94.4 LIVER REPLACED BY TRANSPLANT (H): ICD-10-CM

## 2017-05-05 DIAGNOSIS — C73 THYROID CANCER (H): ICD-10-CM

## 2017-05-05 RX ORDER — PREDNISONE 5 MG/1
5 TABLET ORAL DAILY
Qty: 90 TABLET | Refills: 3 | Status: SHIPPED | OUTPATIENT
Start: 2017-05-05 | End: 2018-03-29

## 2017-05-07 ENCOUNTER — PRE VISIT (OUTPATIENT)
Dept: CARDIOLOGY | Facility: CLINIC | Age: 67
End: 2017-05-07

## 2017-05-07 DIAGNOSIS — I48.0 PAROXYSMAL ATRIAL FIBRILLATION (H): Primary | ICD-10-CM

## 2017-05-08 ENCOUNTER — OFFICE VISIT (OUTPATIENT)
Dept: CARDIOLOGY | Facility: CLINIC | Age: 67
End: 2017-05-08
Attending: INTERNAL MEDICINE
Payer: MEDICARE

## 2017-05-08 VITALS
DIASTOLIC BLOOD PRESSURE: 80 MMHG | SYSTOLIC BLOOD PRESSURE: 148 MMHG | OXYGEN SATURATION: 97 % | BODY MASS INDEX: 29.19 KG/M2 | WEIGHT: 192.6 LBS | HEIGHT: 68 IN | HEART RATE: 89 BPM

## 2017-05-08 DIAGNOSIS — I48.0 PAROXYSMAL ATRIAL FIBRILLATION (H): ICD-10-CM

## 2017-05-08 PROCEDURE — 99213 OFFICE O/P EST LOW 20 MIN: CPT | Mod: ZF

## 2017-05-08 PROCEDURE — 99214 OFFICE O/P EST MOD 30 MIN: CPT | Mod: ZP | Performed by: INTERNAL MEDICINE

## 2017-05-08 PROCEDURE — 93010 ELECTROCARDIOGRAM REPORT: CPT | Mod: ZP | Performed by: INTERNAL MEDICINE

## 2017-05-08 PROCEDURE — 93005 ELECTROCARDIOGRAM TRACING: CPT | Mod: ZF

## 2017-05-08 RX ORDER — METOPROLOL SUCCINATE 25 MG/1
25 TABLET, EXTENDED RELEASE ORAL DAILY
Qty: 30 TABLET | Refills: 3 | Status: SHIPPED | OUTPATIENT
Start: 2017-05-08 | End: 2017-08-14

## 2017-05-08 ASSESSMENT — PAIN SCALES - GENERAL: PAINLEVEL: NO PAIN (0)

## 2017-05-08 NOTE — LETTER
"5/8/2017      RE: Luz Thompson  [address]  [facility] [address]       Dear Colleague,    Thank you for the opportunity to participate in the care of your patient, Luz Thompson, at the [facility] at [facility]. Please see a copy of my visit note below.    Clinical Cardiac Electrophysiology    Chief Complaint: atrial fibrillation    HPI: Luz Thompson presents for follow up for atrial fibrillation.  She is a patient of Dr. Gómez.  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         Today she presents with her .  Stating she was hospitalized with \"valley fever\" and had an episode of asymptomatic atrial fibrillation in January 2017.  States she had Lexiscan and ECHO which were normal.  At the time she elected to no purse anticoagulation or rate control.  Subsequently she had 2 episodes of palpitations associated with SOB and anxiety at home in the last 6 weeks.  Her fitbit recorded  HR of approximately 104 bpm.  The longest episode lasted 3 hours. Of note she was on statin in the past which caused muscle aches and bone pain and has subsequently stopped.       Today's EKG sinus rhythm with ventriclar rate of 80 bpm.     Current Outpatient Prescriptions   Medication Sig Dispense Refill     predniSONE (DELTASONE) 5 MG tablet Take 1 tablet (5 mg) by mouth daily 90 tablet 3     triamcinolone (KENALOG) 0.1 % cream Apply cream twice daily to foot 80 g 0     aspirin 81 MG tablet Take 81 mg by mouth       RAPAMUNE 0.5 MG PO TABLET Take 0.5 mg by mouth every other day 15 tablet 11     voriconazole (VFEND) 200 MG tablet Take 1 tablet (200 mg) by mouth 2 times daily       fluticasone-vilanterol (BREO ELLIPTA) 100-25 MCG/INH oral inhaler Inhale 1 puff " into the lungs daily Reported on 4/25/2017       albuterol (ALBUTEROL) 108 (90 BASE) MCG/ACT Inhaler Inhale 2 puffs into the lungs every 4 hours as needed for shortness of breath / dyspnea or wheezing Reported on 4/25/2017       clotrimazole (LOTRIMIN) 1 % cream Apply topically 2 times daily Reported on 4/25/2017       losartan (COZAAR) 25 MG tablet Take 1 tablet (25 mg) by mouth daily 90 tablet 3     ursodiol (ACTIGALL) 250 MG tablet Take 1 tablet (250 mg) by mouth 2 times daily 180 tablet 3     Wheat Dextrin (BENEFIBER) POWD 2 teaspoons daily       SUMAtriptan (IMITREX) 50 MG tablet Take 1 tablet (50 mg) by mouth at onset of headache for migraine repeat after 2 hours if needed. 30 tablet 3     meclizine (ANTIVERT) 25 MG tablet Take 1 tablet (25 mg) by mouth as needed 30 tablet 11     levothyroxine (SYNTHROID, LEVOTHROID) 150 MCG tablet Take one tablet daily 6 days a week and one and a half tablets one day a week. 96 tablet 3     folic acid (FOLVITE) 1 MG tablet Take 1 tablet (1 mg) by mouth daily 90 tablet 3     furosemide (LASIX) 20 MG tablet Take 1 tablet (20 mg) by mouth every other day 90 tablet 3     STATIN NOT PRESCRIBED, INTENTIONAL, Statin not prescribed intentionally due to Intolerance (with supporting documentation of trying a statin at least once within the last 5 years) 0 each 0     Multiple Vitamins-Minerals (PRESERVISION AREDS 2) CAPS Take 2 capsules by mouth daily       calcitRIOL (ROCALTROL) 0.25 MCG capsule Take 2 tablets daily 180 capsule 3     triamcinolone (KENALOG) 0.1 % cream Apply topically 2 times daily Apply to rash 80 g 3     Hypromellose (ARTIFICIAL TEARS OP) Apply 1 drop to eye as needed       acetaminophen 500 MG CAPS Take 500 mg by mouth as needed Take 500-1,000 mg by mouth every 6 hours if needed.       magnesium 250 MG tablet Take 2 tablets by mouth daily At night.        aspirin 81 MG EC tablet Take 1 tablet by mouth daily.         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     Diagnostic skin and sensitization tests NOT ALLERGIC TO CORN/MILK PER IGE TESTS    10/25/12 IgE tests for corn/milk NEGATIVE     Diverticulosis of sigmoid colon 12/21/2013     GERD (gastroesophageal reflux disease)      H/O esophageal varices      H/O liver transplant (H) 2002    due to primary biliary cirrhosis     Heart murmur 4/4/2012     Hyperlipidemia 4/10/2012    Says that she does not have it anymore, not on meds     Hypocalcemia      MEDIAL MENISCUS TEAR - right 8/2/2012     Migraines 4/4/2012     Osteoarthritis of right knee 8/2/2012     Osteoporosis 4/20/2012     Papillary thyroid carcinoma (H)      Post-surgical hypothyroidism      Primary biliary cirrhosis (H)     s/p Liver transplant, [id number]     Sjogren's syndrome (H)      Thyroid cancer (H)      Unspecified cerebral artery occlusion with cerebral infarction 2001    when BP was very low, small multiple infacts in frontal lobe, had \"visual field cut,\" leg weakness, and expressive aphasia - all have resolved.      Unspecified nonsenile cataract      Vitamin D deficiency 10/1/2012     VRE (vancomycin-resistant Enterococci)        Past Surgical History:   Procedure Laterality Date     APPENDECTOMY  1961     BIOPSY       CATARACT IOL, RT/LT      RE12/19/2013, LE12/10/2013 - Toric lenses     CHOLECYSTECTOMY  1991     COLONOSCOPY       COLONOSCOPY  3/10/2014    Procedure: COLONOSCOPY;;  Surgeon: Gentry Ramirez MD;  Location:  GI     ENDOSCOPIC RETROGRADE CHOLANGIOPANCREATOGRAM  9/19/2013    Procedure: ENDOSCOPIC RETROGRADE CHOLANGIOPANCREATOGRAM;  Endoscopic Retrograde Cholangiopancreatogram with single balloon enteroscopy, ballon sweep of bile duct;  Surgeon: Brett Membreno MD;  Location: [facility] OR     ENT SURGERY      ear drum repair     HC KNEE " "SCOPE,MED/LAT MENISECTOMY  8/10/12    Right, partial medial menisectomy only     KNEE SURGERY  1966    R knee     PICC INSERTION  2013    4fr SL PASV PICC, 40cm (1cm external) in the R basilic vein w/ tip in the low SVC     PICC INSERTION  2014    5 fr DL BioFlo Navilyst PICC, 46 cm (3 cm external) in the L basilic vein w/ tip in the SVC RA junction.     THYROIDECTOMY  3/2010     TRANSPLANT LIVER RECIPIENT LIVING UNRELATED         Family History   Problem Relation Age of Onset     Hypertension Mother      Lipids Mother      Prostate Cancer Father      Macular Degeneration Father      Cancer - colorectal Maternal Grandmother      in her 80's, has surgery and removal of part of kidney,  at age 98     Colon Cancer Maternal Grandmother      Eye Disorder Maternal Grandfather      Glaucoma     HEART DISEASE Maternal Grandfather       at 98     Glaucoma Maternal Grandfather      CEREBROVASCULAR DISEASE Paternal Grandmother      in her 80's     Hypertension Paternal Grandmother      HEART DISEASE Paternal Grandfather      MI     Allergies Son      Neurologic Disorder Daughter      Migraines     Anesthesia Reaction No family hx of      Crohn Disease No family hx of      Ulcerative Colitis No family hx of        Social History   Substance Use Topics     Smoking status: Former Smoker     Packs/day: 1.00     Years: 18.00     Types: Cigarettes     Quit date: 1985     Smokeless tobacco: Never Used     Alcohol use 0.0 oz/week     0 Standard drinks or equivalent per week      Comment: rare - \"I toast at weddings\"       Allergies   Allergen Reactions     Fluconazole Hives and Itching     Benadryl [Diphenhydramine Hcl]      Insomnia      Cefpodoxime Itching     Cellcept Diarrhea     Ciprofloxacin Other (See Comments)     Insomnia, mood lability     Codeine      Psych disturbance     Diphenhydramine Other (See Comments)     Doxycycline      Lansoprazole Diarrhea     Levaquin [Levofloxacin] Other (See " Comments)     Headache, hyperactivity     Lisinopril Cough     Methotrexate      Sores     Morphine Itching     Morphine Sulfate Itching     Mycophenolate Diarrhea     Pepcid Diarrhea     Prevacid Diarrhea     Ranitidine Diarrhea     Zantac Diarrhea     Cephalexin Rash     Fever and skin burning     Penicillin G Itching and Rash     Penicillins Rash     Tolectin [Nsaids] Rash     Tolmetin Rash     Tramadol Rash         ROS:   CONSTITUTIONAL:No report of fever, chills, or change in weight  RESPIRATORY: No cough, wheezing, SOB, or hemoptysis  CARDIOVASCULAR: see HPI  MUSCULO-SKELETAL: No joint pain/swelling, no muslce pain  NEURO: No paresthesias, syncope, pre-syncope, light headness, dizziness or vertigo  ENDOCRINE: No temperature intolerance, no skin/hair changes  PSYCHIATRIC: No change in mood, feeling down/anxious, no change in sleep or appetite  GI: no melena or hematochezia, no change in bowel habits  : no hematuria or dysurea, no hesitancy, dribbling or incontinence  HEME: no easy bruising or bleeding, no history of anemia, no history of blood clots  SKIN: no rashes or sores, no unusual itching    Answers for HPI/ROS submitted by the patient on 5/3/2017   General Symptoms: Yes  Skin Symptoms: No  HENT Symptoms: No  EYE SYMPTOMS: No  HEART SYMPTOMS: Yes  LUNG SYMPTOMS: Yes  INTESTINAL SYMPTOMS: No  URINARY SYMPTOMS: No  GYNECOLOGIC SYMPTOMS: No  BREAST SYMPTOMS: No  SKELETAL SYMPTOMS: Yes  BLOOD SYMPTOMS: No  NERVOUS SYSTEM SYMPTOMS: No  MENTAL HEALTH SYMPTOMS: Yes  Fever: No  Loss of appetite: No  Weight loss: No  Weight gain: No  Fatigue: Yes  Night sweats: Yes  Chills: No  Increased stress: Yes  Excessive hunger: No  Excessive thirst: No  Feeling hot or cold when others believe the temperature is normal: Yes  Loss of height: No  Post-operative complications: No  Surgical site pain: No  Hallucinations: No  Change in or Loss of Energy: Yes  Hyperactivity: No  Confusion: No  Cough: Yes  Sputum or phlegm:  "No  Coughing up blood: No  Difficulty breating or shortness of breath: Yes  Snoring: Yes  Wheezing: No  Difficulty breathing on exertion: Yes  Respiratory pain: No  Nighttime Cough: No  Difficulty breathing when lying flat: No  Chest pain or pressure: No  Fast or irregular heartbeat: Yes  Pain in legs with walking: No  Swelling in feet or ankles: Yes  Trouble breathing while lying down: No  Fingers or Toes appear blue: Yes  High blood pressure: Yes  Low blood pressure: No  Fainting: No  Murmurs: Yes  Chest pain on exertion: No  Chest pain at rest: No  Cramping pain in leg during exercise: No  Pacemaker: No  Varicose veins: Yes  Edema or swelling: Yes  Fast heart beat: Yes  Wake up at night with shortness of breath: No  Heart flutters: No  Light-headedness: No  Exercise intolerance: No  Back pain: Yes  Muscle aches: Yes  Neck pain: No  Swollen joints: No  Joint pain: Yes  Bone pain: Yes  Muscle cramps: No  Muscle weakness: No  Joint stiffness: Yes  Bone fracture: No  Nervous or Anxious: Yes  Depression: No  Trouble sleeping: No  Trouble thinking or concentrating: No  Mood changes: No  Panic attacks: No      Physical Examination:  Vitals: /80 (BP Location: Right arm, Patient Position: Chair, Cuff Size: Adult Regular)  Pulse 89  Ht 1.715 m (5' 7.5\")  Wt 87.4 kg (192 lb 9.6 oz)  SpO2 97%  BMI 29.72 kg/m2  BMI= Body mass index is 29.72 kg/(m^2).    GENERAL APPEARANCE: healthy, alert and no distress  HEENT: no icterus, no xanthelasmas, normal pupil size and reaction, normal palate, mucosa moist, no cyanosis.  NECK: no adenopathy, no asymmetry, masses, or scars, no cervical bruits, JVP is not visible  CHEST: lungs clear to auscultation - no rales, rhonchi or wheezes, no use of accessory muscles, no retractions, respirations are unlabored, normal respiratory rate, no kyphosis, no scoliosis  CARDIOVASCULAR: regular rhythm, normal S1 with physiologic split S2, no S3 or S4 and no murmur, click or rub, precordium " quiet with normal PMI.  ABDOMEN: obese, soft, non tender,bowel sounds normal, no abdominal bruits  EXTREMITIES: no clubbing, cyanosis or edema  NEURO: alert and oriented to person/place/time, normal speech, gait and affect  VASC: Radial and posterior tibialis pulses are normal in volumes and symmetric bilaterally.  SKIN: no ecchymoses, no rashes      Laboratory:  Component      Latest Ref Rng & Units 4/11/2017   Sodium      133 - 144 mmol/L 139   Potassium      3.4 - 5.3 mmol/L 3.9   Chloride      94 - 109 mmol/L 102   Carbon Dioxide      20 - 32 mmol/L 26   Anion Gap      3 - 14 mmol/L 11   Glucose      70 - 99 mg/dL 115 (H)   Urea Nitrogen      7 - 30 mg/dL 33 (H)   Creatinine      0.52 - 1.04 mg/dL 1.38 (H)   GFR Estimate      >60 mL/min/1.7m2 38 (L)   GFR Estimate If Black      >60 mL/min/1.7m2 46 (L)   Calcium      8.5 - 10.1 mg/dL 9.2   Bilirubin Direct      0.0 - 0.2 mg/dL <0.1   Bilirubin Total      0.2 - 1.3 mg/dL 0.2   Albumin      3.4 - 5.0 g/dL 3.3 (L)   Protein Total      6.8 - 8.8 g/dL 7.7   Alkaline Phosphatase      40 - 150 U/L 210 (H)   ALT      0 - 50 U/L 50   AST      0 - 45 U/L 24   Sirolimus Last Dose       [id number]@10AM   Sirolimus Level      5.0 - 15.0 ug/L 9.7   Magnesium      1.6 - 2.3 mg/dL 2.5 (H)       ECHO 7/17/2015   Procedure  Echocardiogram with two-dimensional, color and spectral Doppler performed.  ______________________________________________________________________________     Interpretation Summary  Global and regional left ventricular function is normal with an EF of 60-65%.  Right ventricular function, chamber size, wall motion, and thickness are  normal.  Pulmonary artery systolic pressure is normal.  The inferior vena cava is normal.  No pericardial effusion is present.  ______________________________________________________________________________           Left Ventricle  Global and regional left ventricular function is normal with an EF of 60-65%.  Left ventricular wall  thickness is normal. Left ventricular size is normal.     Right Ventricle  Right ventricular function, chamber size, wall motion, and thickness are  normal.  Atria  The right atria appears normal. Mild left atrial enlargement is present.     Mitral Valve  The mitral valve is normal.     Aortic Valve  Trace to mild aortic insufficiency is present.     Tricuspid Valve  The tricuspid valve is normal. The right ventricular systolic pressure is  approximated at 21.3 mmHg plus the right atrial pressure. Pulmonary artery  systolic pressure is normal.     Pulmonic Valve  The pulmonic valve is normal. Trace pulmonic insufficiency is present.     Vessels  The aorta root is normal. The inferior vena cava is normal.  Pericardium  No pericardial effusion is present.     ______________________________________________________________________________  MMode/2D Measurements & Calculations  IVSd: 1.1 cm  LVIDd: 3.8 cm  LVIDs: 3.1 cm  LVPWd: 0.83 cm  FS: 20.6 %  EDV(Teich): 63.5 ml  ESV(Teich): 36.4 ml  LV mass(C)d: 113.3 grams  Ao root diam: 3.1 cm  LA dimension: 3.2 cm  asc Aorta Diam: 3.3 cm  LA/Ao: 1.0  LVOT diam: 1.9 cm  LVOT area: 2.8 cm2  LA Volume (BP): 74.0 ml     LA Volume Index (BP): 36.6 ml/m2           Doppler Measurements & Calculations  MV E max nate: 75.3 cm/sec  MV A max nate: 92.7 cm/sec  MV E/A: 0.81  MV dec time: 0.17 sec  Ao V2 max: 165.0 cm/sec  Ao max PG: 10.9 mmHg  Ao V2 mean: 113.0 cm/sec  Ao mean P.0 mmHg  Ao V2 VTI: 32.1 cm  MAYUR(I,D): 2.8 cm2  MAYUR(V,D): 2.4 cm2  AI P1/2t: 464.5 msec  LV V1 max: 140.0 cm/sec  LV V1 VTI: 31.6 cm  LV dP/dt: 826.0 mmHg/s  SV(LVOT): 89.6 ml  TR max nate: 230.0 cm/sec  TR max P.3 mmHg  Pulm Sys Nate: 71.9 cm/sec  Pulm Cesar Nate: 35.5 cm/sec  Pulm A Revs Nate: 29.0 cm/sec  Pulm A Revs Dur: 0.14 sec  Pulm S/D: 2.0  MAYUR Index (cm2/m2): 1.4  Lateral E/e': 10.9  Medial E/e': 12.3    Assessment and recommendations:    Atrial fibrillation  We discussed in detail with the patient  management/treatment options for FILEMONfib includin. Stroke Prophylaxis:  CHADSVASC=age, female, HTN, CVA++  5, corresponding to a 6.7% annual stroke / systemic emolism event rate. indicating need for long term oral anticoagulation.  Discussed NOAC and warfarin.  The advantages of NOAC include not having to monitor blood levels or food intake.  Disadvantages of NOACs include high cost.  The advantages of warfarin include inexpensive and the disadvantages include labs draws and monitoring vitamin K intake foods.  She has elected to start Abixaban 5 mg twice daily. This does not appear to interact with her anti-rejection and antifungal medications.    2. Rate Control:   We are starting metoprolol XL 25 mg.   3. Rhythm Control: Cardioversion, Antiarrhythmics and/or ablation are options for rhythm control. An ablation can be considered for any recurrence.   Encouraged patient to contact clinic if she continues to have atrial fibrillation episodes.    4. Risk Factor Management: Referral to Dr. Reyna for lipid management per patient's request.           I appreciate the chance to help with Ms. Jay sheth. Please let me know if you have any questions or concerns.      Follow up 3 months.     LIZ Love CNP    I have seen and examined the patient today as part of a shared visit with Deepa Brink CNP. I reviewed her history at [facility]. On exam today I found her to be in a regular rhythm. I reviewed with her that atrial fibrillation is a chronic condition and that there are two basic approaches. One approach is to try to control the rhythm (prevent the atrial fibrillation from happening) and the other is to not worry about the rhythm itself but to make sure the heart rate is well controlled and to address the risk of stroke. Neither approach is better than the other in regards to the risk of stroke or the risk of dying.    We discussed that controlling the rhythm with medications is not perfect. Even on  medications patients will almost certainly have more episodes of atrial fibrillation. The goal of rhythm therapy is to lessen the frequency and duration of the episodes. Simply having more afib does not mean we have to change medications or increase the dose of a medicine. Those decisions depend on how symptomatic the patient is with their atrial fibrillation.    We also discussed the possibility of abaltion therapy in patients with highly symptomatic episodes that are not controlled by medication.    The decision on whether a patient should be on warfarin is not influenced by the choice of a rhythm control versus rate control strategy. This decision is made based on the patient's risk for embolic complications. I use the CHADS-Vasc score to help with this decision.    Her score indicates a substantial risk of thromboembolic complications and indefinite oral anticoagulation is recommended. I discussed the advantages and disadvantages of warfarin versus a DOAC. Interactions were assessed using Micromedex and apixaban appears to be a reasonable choice.     In patient for whom a rate control strategy is choosen the goal is to ensure that the heart rate is neither too fast or too slow. Fast heart rates can be controlled with medications or occasionally with ablation of the AV node. Symptomatic slow heart rates are treated with a pacemaker.    Graham Gilmore MD

## 2017-05-08 NOTE — PATIENT INSTRUCTIONS
Cardiology Provider you saw in clinic today: LIZ Love, CNP    Medication Changes:  Start metoprolol succinate 25 mg    Labs/Tests needed:       Follow-up Visit:  3 months    Further Instructions:      You will receive all normal lab and testing results via [employer] or mail if not reviewed in clinic today. Please contact our office if you need assistance with setting up MyChart.    If you need a medication refill please contact your pharmacy. Please allow 3 business days for your refill to be completed.     As always, thank you for trusting us with your health care needs!    If you have any questions regarding your visit please contact your care team:   Cardiology  Telephone Number    EP RN  Electrophysiology Nurse Coordinator [phone]     Call for EP procedure scheduling concerns  SANDEEP Valencia  [phone]           Device Clinic (Pacemakers, ICDs, Loop)   RN's : Taniya Moniqeu Connie, Dawn During business hours: [phone]    After business hours:   [phone]- select option 4 and ask for job code [phone].

## 2017-05-08 NOTE — PROGRESS NOTES
"      Clinical Cardiac Electrophysiology    Chief Complaint: atrial fibrillation    HPI: Luz Thompson presents for follow up for atrial fibrillation.  She is a patient of Dr. Gómez.  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         Today she presents with her .  Stating she was hospitalized with \"valley fever\" and had an episode of asymptomatic atrial fibrillation in January 2017.  States she had Lexiscan and ECHO which were normal.  At the time she elected to no purse anticoagulation or rate control.  Subsequently she had 2 episodes of palpitations associated with SOB and anxiety at home in the last 6 weeks.  Her fitbit recorded  HR of approximately 104 bpm.  The longest episode lasted 3 hours. Of note she was on statin in the past which caused muscle aches and bone pain and has subsequently stopped.       Today's EKG sinus rhythm with ventriclar rate of 80 bpm.     Current Outpatient Prescriptions   Medication Sig Dispense Refill     predniSONE (DELTASONE) 5 MG tablet Take 1 tablet (5 mg) by mouth daily 90 tablet 3     triamcinolone (KENALOG) 0.1 % cream Apply cream twice daily to foot 80 g 0     aspirin 81 MG tablet Take 81 mg by mouth       RAPAMUNE 0.5 MG PO TABLET Take 0.5 mg by mouth every other day 15 tablet 11     voriconazole (VFEND) 200 MG tablet Take 1 tablet (200 mg) by mouth 2 times daily       fluticasone-vilanterol (BREO ELLIPTA) 100-25 MCG/INH oral inhaler Inhale 1 puff into the lungs daily Reported on 4/25/2017       albuterol (ALBUTEROL) 108 (90 BASE) MCG/ACT Inhaler Inhale 2 puffs into the lungs every 4 hours as needed for shortness of breath / dyspnea or wheezing Reported on 4/25/2017       clotrimazole (LOTRIMIN) 1 % cream Apply topically 2 times daily Reported on 4/25/2017       " losartan (COZAAR) 25 MG tablet Take 1 tablet (25 mg) by mouth daily 90 tablet 3     ursodiol (ACTIGALL) 250 MG tablet Take 1 tablet (250 mg) by mouth 2 times daily 180 tablet 3     Wheat Dextrin (BENEFIBER) POWD 2 teaspoons daily       SUMAtriptan (IMITREX) 50 MG tablet Take 1 tablet (50 mg) by mouth at onset of headache for migraine repeat after 2 hours if needed. 30 tablet 3     meclizine (ANTIVERT) 25 MG tablet Take 1 tablet (25 mg) by mouth as needed 30 tablet 11     levothyroxine (SYNTHROID, LEVOTHROID) 150 MCG tablet Take one tablet daily 6 days a week and one and a half tablets one day a week. 96 tablet 3     folic acid (FOLVITE) 1 MG tablet Take 1 tablet (1 mg) by mouth daily 90 tablet 3     furosemide (LASIX) 20 MG tablet Take 1 tablet (20 mg) by mouth every other day 90 tablet 3     STATIN NOT PRESCRIBED, INTENTIONAL, Statin not prescribed intentionally due to Intolerance (with supporting documentation of trying a statin at least once within the last 5 years) 0 each 0     Multiple Vitamins-Minerals (PRESERVISION AREDS 2) CAPS Take 2 capsules by mouth daily       calcitRIOL (ROCALTROL) 0.25 MCG capsule Take 2 tablets daily 180 capsule 3     triamcinolone (KENALOG) 0.1 % cream Apply topically 2 times daily Apply to rash 80 g 3     Hypromellose (ARTIFICIAL TEARS OP) Apply 1 drop to eye as needed       acetaminophen 500 MG CAPS Take 500 mg by mouth as needed Take 500-1,000 mg by mouth every 6 hours if needed.       magnesium 250 MG tablet Take 2 tablets by mouth daily At night.        aspirin 81 MG EC tablet Take 1 tablet by mouth daily.         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     Diagnostic skin and sensitization tests NOT ALLERGIC TO CORN/MILK PER IGE TESTS    10/25/12 IgE tests for  "corn/milk NEGATIVE     Diverticulosis of sigmoid colon 12/21/2013     GERD (gastroesophageal reflux disease)      H/O esophageal varices      H/O liver transplant (H) 2002    due to primary biliary cirrhosis     Heart murmur 4/4/2012     Hyperlipidemia 4/10/2012    Says that she does not have it anymore, not on meds     Hypocalcemia      MEDIAL MENISCUS TEAR - right 8/2/2012     Migraines 4/4/2012     Osteoarthritis of right knee 8/2/2012     Osteoporosis 4/20/2012     Papillary thyroid carcinoma (H)      Post-surgical hypothyroidism      Primary biliary cirrhosis (H)     s/p Liver transplant, [id number]     Sjogren's syndrome (H)      Thyroid cancer (H)      Unspecified cerebral artery occlusion with cerebral infarction 2001    when BP was very low, small multiple infacts in frontal lobe, had \"visual field cut,\" leg weakness, and expressive aphasia - all have resolved.      Unspecified nonsenile cataract      Vitamin D deficiency 10/1/2012     VRE (vancomycin-resistant Enterococci)        Past Surgical History:   Procedure Laterality Date     APPENDECTOMY  1961     BIOPSY       CATARACT IOL, RT/LT      RE12/19/2013, LE12/10/2013 - Toric lenses     CHOLECYSTECTOMY  1991     COLONOSCOPY       COLONOSCOPY  3/10/2014    Procedure: COLONOSCOPY;;  Surgeon: Gentry Ramirez MD;  Location: [facility]     ENDOSCOPIC RETROGRADE CHOLANGIOPANCREATOGRAM  9/19/2013    Procedure: ENDOSCOPIC RETROGRADE CHOLANGIOPANCREATOGRAM;  Endoscopic Retrograde Cholangiopancreatogram with single balloon enteroscopy, ballon sweep of bile duct;  Surgeon: Brett Membreno MD;  Location: [facility]     ENT SURGERY      ear drum repair     HC KNEE SCOPE,MED/LAT MENISECTOMY  8/10/12    Right, partial medial menisectomy only     KNEE SURGERY  1966    R knee     PICC INSERTION  9/18/2013    4fr SL PASV PICC, 40cm (1cm external) in the R basilic vein w/ tip in the low SVC     PICC INSERTION  2/21/2014    5 fr DL BioFlo Navilyst PICC, 46 cm (3 cm external) in the L " "basilic vein w/ tip in the SVC RA junction.     THYROIDECTOMY  3/2010     TRANSPLANT LIVER RECIPIENT LIVING UNRELATED         Family History   Problem Relation Age of Onset     Hypertension Mother      Lipids Mother      Prostate Cancer Father      Macular Degeneration Father      Cancer - colorectal Maternal Grandmother      in her 80's, has surgery and removal of part of kidney,  at age 98     Colon Cancer Maternal Grandmother      Eye Disorder Maternal Grandfather      Glaucoma     HEART DISEASE Maternal Grandfather       at 98     Glaucoma Maternal Grandfather      CEREBROVASCULAR DISEASE Paternal Grandmother      in her 80's     Hypertension Paternal Grandmother      HEART DISEASE Paternal Grandfather      MI     Allergies Son      Neurologic Disorder Daughter      Migraines     Anesthesia Reaction No family hx of      Crohn Disease No family hx of      Ulcerative Colitis No family hx of        Social History   Substance Use Topics     Smoking status: Former Smoker     Packs/day: 1.00     Years: 18.00     Types: Cigarettes     Quit date: 1985     Smokeless tobacco: Never Used     Alcohol use 0.0 oz/week     0 Standard drinks or equivalent per week      Comment: rare - \"I toast at weddings\"       Allergies   Allergen Reactions     Fluconazole Hives and Itching     Benadryl [Diphenhydramine Hcl]      Insomnia      Cefpodoxime Itching     Cellcept Diarrhea     Ciprofloxacin Other (See Comments)     Insomnia, mood lability     Codeine      Psych disturbance     Diphenhydramine Other (See Comments)     Doxycycline      Lansoprazole Diarrhea     Levaquin [Levofloxacin] Other (See Comments)     Headache, hyperactivity     Lisinopril Cough     Methotrexate      Sores     Morphine Itching     Morphine Sulfate Itching     Mycophenolate Diarrhea     Pepcid Diarrhea     Prevacid Diarrhea     Ranitidine Diarrhea     Zantac Diarrhea     Cephalexin Rash     Fever and skin burning     Penicillin G Itching " and Rash     Penicillins Rash     Tolectin [Nsaids] Rash     Tolmetin Rash     Tramadol Rash         ROS:   CONSTITUTIONAL:No report of fever, chills, or change in weight  RESPIRATORY: No cough, wheezing, SOB, or hemoptysis  CARDIOVASCULAR: see HPI  MUSCULO-SKELETAL: No joint pain/swelling, no muslce pain  NEURO: No paresthesias, syncope, pre-syncope, light headness, dizziness or vertigo  ENDOCRINE: No temperature intolerance, no skin/hair changes  PSYCHIATRIC: No change in mood, feeling down/anxious, no change in sleep or appetite  GI: no melena or hematochezia, no change in bowel habits  : no hematuria or dysurea, no hesitancy, dribbling or incontinence  HEME: no easy bruising or bleeding, no history of anemia, no history of blood clots  SKIN: no rashes or sores, no unusual itching    Answers for HPI/ROS submitted by the patient on 5/3/2017   General Symptoms: Yes  Skin Symptoms: No  HENT Symptoms: No  EYE SYMPTOMS: No  HEART SYMPTOMS: Yes  LUNG SYMPTOMS: Yes  INTESTINAL SYMPTOMS: No  URINARY SYMPTOMS: No  GYNECOLOGIC SYMPTOMS: No  BREAST SYMPTOMS: No  SKELETAL SYMPTOMS: Yes  BLOOD SYMPTOMS: No  NERVOUS SYSTEM SYMPTOMS: No  MENTAL HEALTH SYMPTOMS: Yes  Fever: No  Loss of appetite: No  Weight loss: No  Weight gain: No  Fatigue: Yes  Night sweats: Yes  Chills: No  Increased stress: Yes  Excessive hunger: No  Excessive thirst: No  Feeling hot or cold when others believe the temperature is normal: Yes  Loss of height: No  Post-operative complications: No  Surgical site pain: No  Hallucinations: No  Change in or Loss of Energy: Yes  Hyperactivity: No  Confusion: No  Cough: Yes  Sputum or phlegm: No  Coughing up blood: No  Difficulty breating or shortness of breath: Yes  Snoring: Yes  Wheezing: No  Difficulty breathing on exertion: Yes  Respiratory pain: No  Nighttime Cough: No  Difficulty breathing when lying flat: No  Chest pain or pressure: No  Fast or irregular heartbeat: Yes  Pain in legs with walking:  "No  Swelling in feet or ankles: Yes  Trouble breathing while lying down: No  Fingers or Toes appear blue: Yes  High blood pressure: Yes  Low blood pressure: No  Fainting: No  Murmurs: Yes  Chest pain on exertion: No  Chest pain at rest: No  Cramping pain in leg during exercise: No  Pacemaker: No  Varicose veins: Yes  Edema or swelling: Yes  Fast heart beat: Yes  Wake up at night with shortness of breath: No  Heart flutters: No  Light-headedness: No  Exercise intolerance: No  Back pain: Yes  Muscle aches: Yes  Neck pain: No  Swollen joints: No  Joint pain: Yes  Bone pain: Yes  Muscle cramps: No  Muscle weakness: No  Joint stiffness: Yes  Bone fracture: No  Nervous or Anxious: Yes  Depression: No  Trouble sleeping: No  Trouble thinking or concentrating: No  Mood changes: No  Panic attacks: No      Physical Examination:  Vitals: /80 (BP Location: Right arm, Patient Position: Chair, Cuff Size: Adult Regular)  Pulse 89  Ht 1.715 m (5' 7.5\")  Wt 87.4 kg (192 lb 9.6 oz)  SpO2 97%  BMI 29.72 kg/m2  BMI= Body mass index is 29.72 kg/(m^2).    GENERAL APPEARANCE: healthy, alert and no distress  HEENT: no icterus, no xanthelasmas, normal pupil size and reaction, normal palate, mucosa moist, no cyanosis.  NECK: no adenopathy, no asymmetry, masses, or scars, no cervical bruits, JVP is not visible  CHEST: lungs clear to auscultation - no rales, rhonchi or wheezes, no use of accessory muscles, no retractions, respirations are unlabored, normal respiratory rate, no kyphosis, no scoliosis  CARDIOVASCULAR: regular rhythm, normal S1 with physiologic split S2, no S3 or S4 and no murmur, click or rub, precordium quiet with normal PMI.  ABDOMEN: obese, soft, non tender,bowel sounds normal, no abdominal bruits  EXTREMITIES: no clubbing, cyanosis or edema  NEURO: alert and oriented to person/place/time, normal speech, gait and affect  VASC: Radial and posterior tibialis pulses are normal in volumes and symmetric " bilaterally.  SKIN: no ecchymoses, no rashes      Laboratory:  Component      Latest Ref Rng & Units 4/11/2017   Sodium      133 - 144 mmol/L 139   Potassium      3.4 - 5.3 mmol/L 3.9   Chloride      94 - 109 mmol/L 102   Carbon Dioxide      20 - 32 mmol/L 26   Anion Gap      3 - 14 mmol/L 11   Glucose      70 - 99 mg/dL 115 (H)   Urea Nitrogen      7 - 30 mg/dL 33 (H)   Creatinine      0.52 - 1.04 mg/dL 1.38 (H)   GFR Estimate      >60 mL/min/1.7m2 38 (L)   GFR Estimate If Black      >60 mL/min/1.7m2 46 (L)   Calcium      8.5 - 10.1 mg/dL 9.2   Bilirubin Direct      0.0 - 0.2 mg/dL <0.1   Bilirubin Total      0.2 - 1.3 mg/dL 0.2   Albumin      3.4 - 5.0 g/dL 3.3 (L)   Protein Total      6.8 - 8.8 g/dL 7.7   Alkaline Phosphatase      40 - 150 U/L 210 (H)   ALT      0 - 50 U/L 50   AST      0 - 45 U/L 24   Sirolimus Last Dose       [id number]@10AM   Sirolimus Level      5.0 - 15.0 ug/L 9.7   Magnesium      1.6 - 2.3 mg/dL 2.5 (H)       ECHO 7/17/2015   Procedure  Echocardiogram with two-dimensional, color and spectral Doppler performed.  ______________________________________________________________________________     Interpretation Summary  Global and regional left ventricular function is normal with an EF of 60-65%.  Right ventricular function, chamber size, wall motion, and thickness are  normal.  Pulmonary artery systolic pressure is normal.  The inferior vena cava is normal.  No pericardial effusion is present.  ______________________________________________________________________________           Left Ventricle  Global and regional left ventricular function is normal with an EF of 60-65%.  Left ventricular wall thickness is normal. Left ventricular size is normal.     Right Ventricle  Right ventricular function, chamber size, wall motion, and thickness are  normal.  Atria  The right atria appears normal. Mild left atrial enlargement is present.     Mitral Valve  The mitral valve is normal.     Aortic  Valve  Trace to mild aortic insufficiency is present.     Tricuspid Valve  The tricuspid valve is normal. The right ventricular systolic pressure is  approximated at 21.3 mmHg plus the right atrial pressure. Pulmonary artery  systolic pressure is normal.     Pulmonic Valve  The pulmonic valve is normal. Trace pulmonic insufficiency is present.     Vessels  The aorta root is normal. The inferior vena cava is normal.  Pericardium  No pericardial effusion is present.     ______________________________________________________________________________  MMode/2D Measurements & Calculations  IVSd: 1.1 cm  LVIDd: 3.8 cm  LVIDs: 3.1 cm  LVPWd: 0.83 cm  FS: 20.6 %  EDV(Teich): 63.5 ml  ESV(Teich): 36.4 ml  LV mass(C)d: 113.3 grams  Ao root diam: 3.1 cm  LA dimension: 3.2 cm  asc Aorta Diam: 3.3 cm  LA/Ao: 1.0  LVOT diam: 1.9 cm  LVOT area: 2.8 cm2  LA Volume (BP): 74.0 ml     LA Volume Index (BP): 36.6 ml/m2           Doppler Measurements & Calculations  MV E max nate: 75.3 cm/sec  MV A max nate: 92.7 cm/sec  MV E/A: 0.81  MV dec time: 0.17 sec  Ao V2 max: 165.0 cm/sec  Ao max PG: 10.9 mmHg  Ao V2 mean: 113.0 cm/sec  Ao mean P.0 mmHg  Ao V2 VTI: 32.1 cm  MAYUR(I,D): 2.8 cm2  MAYUR(V,D): 2.4 cm2  AI P1/2t: 464.5 msec  LV V1 max: 140.0 cm/sec  LV V1 VTI: 31.6 cm  LV dP/dt: 826.0 mmHg/s  SV(LVOT): 89.6 ml  TR max nate: 230.0 cm/sec  TR max P.3 mmHg  Pulm Sys Nate: 71.9 cm/sec  Pulm Cesar Nate: 35.5 cm/sec  Pulm A Revs Nate: 29.0 cm/sec  Pulm A Revs Dur: 0.14 sec  Pulm S/D: 2.0  MAYUR Index (cm2/m2): 1.4  Lateral E/e': 10.9  Medial E/e': 12.3    Assessment and recommendations:    Atrial fibrillation  We discussed in detail with the patient management/treatment options for A.fib includin. Stroke Prophylaxis:  CHADSVASC=age, female, HTN, CVA++  5, corresponding to a 6.7% annual stroke / systemic emolism event rate. indicating need for long term oral anticoagulation.  Discussed NOAC and warfarin.  The advantages of NOAC include not  having to monitor blood levels or food intake.  Disadvantages of NOACs include high cost.  The advantages of warfarin include inexpensive and the disadvantages include labs draws and monitoring vitamin K intake foods.  She has elected to start Abixaban 5 mg twice daily. This does not appear to interact with her anti-rejection and antifungal medications.    2. Rate Control:   We are starting metoprolol XL 25 mg.   3. Rhythm Control: Cardioversion, Antiarrhythmics and/or ablation are options for rhythm control. An ablation can be considered for any recurrence.   Encouraged patient to contact clinic if she continues to have atrial fibrillation episodes.    4. Risk Factor Management: Referral to Dr. Reyna for lipid management per patient's request.           I appreciate the chance to help with Ms. Jay sheth. Please let me know if you have any questions or concerns.      Follow up 3 months.     LIZ Love CNP    I have seen and examined the patient today as part of a shared visit with Deepa Brink CNP. I reviewed her history at [facility]. On exam today I found her to be in a regular rhythm. I reviewed with her that atrial fibrillation is a chronic condition and that there are two basic approaches. One approach is to try to control the rhythm (prevent the atrial fibrillation from happening) and the other is to not worry about the rhythm itself but to make sure the heart rate is well controlled and to address the risk of stroke. Neither approach is better than the other in regards to the risk of stroke or the risk of dying.    We discussed that controlling the rhythm with medications is not perfect. Even on medications patients will almost certainly have more episodes of atrial fibrillation. The goal of rhythm therapy is to lessen the frequency and duration of the episodes. Simply having more afib does not mean we have to change medications or increase the dose of a medicine. Those decisions depend on how  symptomatic the patient is with their atrial fibrillation.    We also discussed the possibility of abaltion therapy in patients with highly symptomatic episodes that are not controlled by medication.    The decision on whether a patient should be on warfarin is not influenced by the choice of a rhythm control versus rate control strategy. This decision is made based on the patient's risk for embolic complications. I use the CHADS-Vasc score to help with this decision.    Her score indicates a substantial risk of thromboembolic complications and indefinite oral anticoagulation is recommended. I discussed the advantages and disadvantages of warfarin versus a DOAC. Interactions were assessed using Micromedex and apixaban appears to be a reasonable choice.     In patient for whom a rate control strategy is choosen the goal is to ensure that the heart rate is neither too fast or too slow. Fast heart rates can be controlled with medications or occasionally with ablation of the AV node. Symptomatic slow heart rates are treated with a pacemaker.    Graham Gilmore MD

## 2017-05-08 NOTE — MR AVS SNAPSHOT
After Visit Summary   5/8/2017    Luz Thompson    MRN: [id number]           Patient Information     Date Of Birth          1950        Visit Information        Provider Department      5/8/2017 4:40 PM Graham Gilmore MD [facility]        Today's Diagnoses     Paroxysmal atrial fibrillation (H)          Care Instructions    Cardiology Provider you saw in clinic today: LIZ Love, CNP    Medication Changes:  Start metoprolol succinate 25 mg    Labs/Tests needed:       Follow-up Visit:  3 months    Further Instructions:      You will receive all normal lab and testing results via MyChart or mail if not reviewed in clinic today. Please contact our office if you need assistance with setting up MyChart.    If you need a medication refill please contact your pharmacy. Please allow 3 business days for your refill to be completed.     As always, thank you for trusting us with your health care needs!    If you have any questions regarding your visit please contact your care team:   Cardiology  Telephone Number    EP RN  Electrophysiology Nurse Coordinator [phone]     Call for EP procedure scheduling concerns  SANDEEP Valencia  [phone]           Device Clinic (Pacemakers, ICDs, Loop)   RN's : Taniya Monique Connie, Dawn During business hours: [phone]    After business hours:   [phone]- select option 4 and ask for job code [id number].                Follow-ups after your visit        Follow-up notes from your care team     Discussed this visit Return in about 3 months (around 8/8/2017) for Ena Gilmore, Routine Visit.      Your next 10 appointments already scheduled     May 15, 2017 11:00 AM CDT   (Arrive by 10:30 AM)   RETURN ENDOCRINE with Rach Vasquez MD   [facility] ([facility])    [address] [place] [address] [phone]            May 23, 2017 12:00 PM CDT    (Arrive by 11:45 AM)   Return Visit with Mavis Bermudez MD   [facility] ([facility])    [address] [place] [address]   [phone]            May 24, 2017 10:00 AM CDT   (Arrive by 9:30 AM)   Return Visit with Crystal Montero MD   [facility] ([facility])    [address] [place] [address]   [phone]            Jul 12, 2017  1:00 PM CDT   (Arrive by 12:45 PM)   New Patient Visit with Randell Reyna MD   [facility] ([facility])    [address] [place] [address]   [phone]            Jul 31, 2017 10:00 AM CDT   (Arrive by 9:45 AM)   CT CHEST W/O CONTRAST with UCCT1   [facility] [place] [facility] ([facility])    [address] [place] [id number] [phone]           Please bring any scans or X-rays taken at other hospitals, if similar tests were done. Also bring a list of your medicines, including vitamins, minerals and over-the-counter drugs. It is safest to leave personal items at home.  Be sure to tell your doctor:   If you have any allergies.   If there s any chance you are pregnant.   If you are breastfeeding.   If you have any special needs.  You do not need to do anything special to prepare.  Please wear loose clothing, such as a sweat suit or jogging clothes. Avoid snaps, zippers and other metal. We may ask you to undress and put on a hospital gown.            Jul 31, 2017 10:30 AM CDT   (Arrive by 10:15 AM)   Return Visit with Eden Clinton MD   [facility] ([facility])    [address] [place] [address]   [phone]            Aug 14, 2017  1:00 PM CDT   (Arrive by 12:45 PM)   ATRIAL FIBULATION VISIT with Graham Nichols  "MD Deirdre   [facility] ([facility])    [address]  [address] [facility] [address] [phone]            Sep 19, 2017 11:15 AM CDT   (Arrive by 11:00 AM)   Return General Liver with Gentry Ramirez MD   [facility] ([facility])    [address] [address] [phone]              Who to contact     If you have questions or need follow up information about today's clinic visit or your schedule please contact [facility] directly at [phone].  Normal or non-critical lab and imaging results will be communicated to you by MyChart, letter or phone within 4 business days after the clinic has received the results. If you do not hear from us within 7 days, please contact the clinic through [employer] or phone. If you have a critical or abnormal lab result, we will notify you by phone as soon as possible.  Submit refill requests through [employer] or call your pharmacy and they will forward the refill request to us. Please allow 3 business days for your refill to be completed.          Additional Information About Your Visit        [employer] Information     [employer] gives you secure access to your electronic health record. If you see a primary care provider, you can also send messages to your care team and make appointments. If you have questions, please call your primary care clinic.  If you do not have a primary care provider, please call [phone] and they will assist you.        Care EveryWhere ID     This is your Care EveryWhere ID. This could be used by other organizations to access your [facility] medical records  [id number]        Your Vitals Were     Pulse Height Pulse Oximetry BMI (Body Mass Index)          89 1.715 m (5' 7.5\") 97% 29.72 kg/m2         Blood Pressure from Last 3 Encounters:   05/08/17 148/80   04/24/17 154/84   04/19/17 160/83    Weight from Last 3 Encounters:   05/08/17 " 87.4 kg (192 lb 9.6 oz)   05/02/17 88.5 kg (195 lb)   04/24/17 88.9 kg (196 lb)              We Performed the Following     EKG 12-lead, tracing only (Future)          Today's Medication Changes          These changes are accurate as of: 5/8/17  6:13 PM.  If you have any questions, ask your nurse or doctor.               Start taking these medicines.        Dose/Directions    apixaban ANTICOAGULANT 5 MG tablet   Commonly known as:  ELIQUIS   Used for:  Paroxysmal atrial fibrillation (H)        Dose:  5 mg   Take 1 tablet (5 mg) by mouth 2 times daily   Quantity:  60 tablet   Refills:  3       metoprolol 25 MG 24 hr tablet   Commonly known as:  TOPROL-XL   Used for:  Paroxysmal atrial fibrillation (H)        Dose:  25 mg   Take 1 tablet (25 mg) by mouth daily   Quantity:  30 tablet   Refills:  3            Where to get your medicines      These medications were sent to [id number] - [place], [place] - [address]  [address], [facility] [address]     Phone:  [phone]     apixaban ANTICOAGULANT 5 MG tablet    metoprolol 25 MG 24 hr tablet                Primary Care Provider Office Phone # Fax #    Jennifer Amparo Barraza [phone]       [place] [address] [facility] [address]        Thank you!     Thank you for choosing [facility]  for your care. Our goal is always to provide you with excellent care. Hearing back from our patients is one way we can continue to improve our services. Please take a few minutes to complete the written survey that you may receive in the mail after your visit with us. Thank you!             Your Updated Medication List - Protect others around you: Learn how to safely use, store and throw away your medicines at www.disposemymeds.org.          This list is accurate as of: 5/8/17  6:13 PM.  Always use your most recent med list.                   Brand Name Dispense Instructions for use    acetaminophen 500 MG Caps       Take 500 mg by mouth as needed Take 500-1,000 mg by mouth every 6 hours if needed.       albuterol 108 (90 BASE) MCG/ACT Inhaler   Generic drug:  albuterol      Inhale 2 puffs into the lungs every 4 hours as needed for shortness of breath / dyspnea or wheezing Reported on 4/25/2017       apixaban ANTICOAGULANT 5 MG tablet    ELIQUIS    60 tablet    Take 1 tablet (5 mg) by mouth 2 times daily       ARTIFICIAL TEARS OP      Apply 1 drop to eye as needed       * aspirin 81 MG tablet      Take 81 mg by mouth       * aspirin 81 MG EC tablet      Take 1 tablet by mouth daily.       BENEFIBER Powd      2 teaspoons daily       BREO ELLIPTA 100-25 MCG/INH oral inhaler   Generic drug:  fluticasone-vilanterol      Inhale 1 puff into the lungs daily Reported on 4/25/2017       calcitRIOL 0.25 MCG capsule    ROCALTROL    180 capsule    Take 2 tablets daily       clotrimazole 1 % cream    LOTRIMIN     Apply topically 2 times daily Reported on 4/25/2017       folic acid 1 MG tablet    FOLVITE    90 tablet    Take 1 tablet (1 mg) by mouth daily       furosemide 20 MG tablet    LASIX    90 tablet    Take 1 tablet (20 mg) by mouth every other day       levothyroxine 150 MCG tablet    SYNTHROID/LEVOTHROID    96 tablet    Take one tablet daily 6 days a week and one and a half tablets one day a week.       losartan 25 MG tablet    COZAAR    90 tablet    Take 1 tablet (25 mg) by mouth daily       magnesium 250 MG tablet      Take 2 tablets by mouth daily At night.       meclizine 25 MG tablet    ANTIVERT    30 tablet    Take 1 tablet (25 mg) by mouth as needed       metoprolol 25 MG 24 hr tablet    TOPROL-XL    30 tablet    Take 1 tablet (25 mg) by mouth daily       predniSONE 5 MG tablet    DELTASONE    90 tablet    Take 1 tablet (5 mg) by mouth daily       PRESERVISION AREDS 2 Caps      Take 2 capsules by mouth daily       sirolimus Tabs tablet     15 tablet    Take 0.5 mg by mouth every other day       STATIN NOT PRESCRIBED  (INTENTIONAL)     0 each    Statin not prescribed intentionally due to Intolerance (with supporting documentation of trying a statin at least once within the last 5 years)       SUMAtriptan 50 MG tablet    IMITREX    30 tablet    Take 1 tablet (50 mg) by mouth at onset of headache for migraine repeat after 2 hours if needed.       * triamcinolone 0.1 % cream    KENALOG    80 g    Apply topically 2 times daily Apply to rash       * triamcinolone 0.1 % cream    KENALOG    80 g    Apply cream twice daily to foot       ursodiol 250 MG tablet    ACTIGALL    180 tablet    Take 1 tablet (250 mg) by mouth 2 times daily       voriconazole 200 MG tablet    VFEND     Take 1 tablet (200 mg) by mouth 2 times daily       * Notice:  This list has 4 medication(s) that are the same as other medications prescribed for you. Read the directions carefully, and ask your doctor or other care provider to review them with you.

## 2017-05-10 DIAGNOSIS — C73 PAPILLARY CARCINOMA OF THYROID (H): ICD-10-CM

## 2017-05-10 DIAGNOSIS — E89.2 POSTSURGICAL HYPOPARATHYROIDISM (H): ICD-10-CM

## 2017-05-10 DIAGNOSIS — E89.0 POSTABLATIVE HYPOTHYROIDISM: ICD-10-CM

## 2017-05-10 DIAGNOSIS — B38.2 COCCIDIOIDAL PNEUMONITIS (H): ICD-10-CM

## 2017-05-10 DIAGNOSIS — Z94.4 LIVER REPLACED BY TRANSPLANT (H): ICD-10-CM

## 2017-05-10 LAB
ERYTHROCYTE [DISTWIDTH] IN BLOOD BY AUTOMATED COUNT: 17.8 % (ref 10–15)
HCT VFR BLD AUTO: 38.8 % (ref 35–47)
HGB BLD-MCNC: 12.5 G/DL (ref 11.7–15.7)
INTERPRETATION ECG - MUSE: NORMAL
MCH RBC QN AUTO: 28.3 PG (ref 26.5–33)
MCHC RBC AUTO-ENTMCNC: 32.2 G/DL (ref 31.5–36.5)
MCV RBC AUTO: 88 FL (ref 78–100)
PLATELET # BLD AUTO: 331 10E9/L (ref 150–450)
RBC # BLD AUTO: 4.41 10E12/L (ref 3.8–5.2)
WBC # BLD AUTO: 9.2 10E9/L (ref 4–11)

## 2017-05-10 PROCEDURE — 83735 ASSAY OF MAGNESIUM: CPT | Performed by: FAMILY MEDICINE

## 2017-05-10 PROCEDURE — 99000 SPECIMEN HANDLING OFFICE-LAB: CPT | Performed by: FAMILY MEDICINE

## 2017-05-10 PROCEDURE — 84432 ASSAY OF THYROGLOBULIN: CPT | Mod: 90 | Performed by: FAMILY MEDICINE

## 2017-05-10 PROCEDURE — 80076 HEPATIC FUNCTION PANEL: CPT | Performed by: INTERNAL MEDICINE

## 2017-05-10 PROCEDURE — 84100 ASSAY OF PHOSPHORUS: CPT | Performed by: FAMILY MEDICINE

## 2017-05-10 PROCEDURE — 82306 VITAMIN D 25 HYDROXY: CPT | Performed by: INTERNAL MEDICINE

## 2017-05-10 PROCEDURE — 86800 THYROGLOBULIN ANTIBODY: CPT | Mod: 90 | Performed by: FAMILY MEDICINE

## 2017-05-10 PROCEDURE — 80048 BASIC METABOLIC PNL TOTAL CA: CPT | Performed by: INTERNAL MEDICINE

## 2017-05-10 PROCEDURE — 84439 ASSAY OF FREE THYROXINE: CPT | Performed by: FAMILY MEDICINE

## 2017-05-10 PROCEDURE — 36415 COLL VENOUS BLD VENIPUNCTURE: CPT | Performed by: INTERNAL MEDICINE

## 2017-05-10 PROCEDURE — 85027 COMPLETE CBC AUTOMATED: CPT | Performed by: FAMILY MEDICINE

## 2017-05-10 PROCEDURE — 80195 ASSAY OF SIROLIMUS: CPT | Performed by: INTERNAL MEDICINE

## 2017-05-10 PROCEDURE — 84443 ASSAY THYROID STIM HORMONE: CPT | Performed by: FAMILY MEDICINE

## 2017-05-10 PROCEDURE — 80061 LIPID PANEL: CPT | Performed by: INTERNAL MEDICINE

## 2017-05-10 PROCEDURE — 83721 ASSAY OF BLOOD LIPOPROTEIN: CPT | Mod: 59 | Performed by: FAMILY MEDICINE

## 2017-05-10 RX ORDER — VORICONAZOLE 200 MG/1
200 TABLET, FILM COATED ORAL 2 TIMES DAILY
Qty: 60 TABLET | Refills: 11 | Status: SHIPPED | OUTPATIENT
Start: 2017-05-10 | End: 2017-11-27

## 2017-05-11 ENCOUNTER — TELEPHONE (OUTPATIENT)
Dept: INFECTIOUS DISEASES | Facility: CLINIC | Age: 67
End: 2017-05-11

## 2017-05-11 LAB
ALBUMIN SERPL-MCNC: 3.7 G/DL (ref 3.4–5)
ALP SERPL-CCNC: 202 U/L (ref 40–150)
ALT SERPL W P-5'-P-CCNC: 56 U/L (ref 0–50)
AST SERPL W P-5'-P-CCNC: 19 U/L (ref 0–45)
BILIRUB DIRECT SERPL-MCNC: <0.1 MG/DL (ref 0–0.2)
BILIRUB SERPL-MCNC: 0.2 MG/DL (ref 0.2–1.3)
MAGNESIUM SERPL-MCNC: 2.8 MG/DL (ref 1.6–2.3)
PHOSPHATE SERPL-MCNC: 3.1 MG/DL (ref 2.5–4.5)
PROT SERPL-MCNC: 8.1 G/DL (ref 6.8–8.8)
SIROLIMUS BLD-MCNC: 5.8 UG/L (ref 5–15)
T4 FREE SERPL-MCNC: 1.48 NG/DL (ref 0.76–1.46)
TME LAST DOSE: NORMAL H
TSH SERPL DL<=0.05 MIU/L-ACNC: 4.74 MU/L (ref 0.4–4)

## 2017-05-11 NOTE — TELEPHONE ENCOUNTER
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

## 2017-05-12 DIAGNOSIS — R10.9 ABDOMINAL PAIN: Primary | ICD-10-CM

## 2017-05-12 LAB
DLCOCOR-%PRED-PRE: 66 %
DLCOCOR-PRE: 14.75 ML/MIN/MMHG
DLCOUNC-%PRED-PRE: 62 %
DLCOUNC-PRE: 13.81 ML/MIN/MMHG
DLCOUNC-PRED: 22.16 ML/MIN/MMHG
ERV-%PRED-PRE: 29 %
ERV-PRE: 0.19 L
ERV-PRED: 0.64 L
EXPTIME-PRE: 8.27 SEC
FEF2575-%PRED-PRE: 133 %
FEF2575-PRE: 2.79 L/SEC
FEF2575-PRED: 2.09 L/SEC
FEFMAX-%PRED-PRE: 119 %
FEFMAX-PRE: 7.59 L/SEC
FEFMAX-PRED: 6.34 L/SEC
FEV1-%PRED-PRE: 85 %
FEV1-PRE: 2.18 L
FEV1FEV6-PRE: 86 %
FEV1FEV6-PRED: 79 %
FEV1FVC-PRE: 85 %
FEV1FVC-PRED: 78 %
FEV1SVC-PRE: 88 %
FEV1SVC-PRED: 72 %
FIFMAX-PRE: 5.52 L/SEC
FVC-%PRED-PRE: 78 %
FVC-PRE: 2.58 L
FVC-PRED: 3.28 L
IC-%PRED-PRE: 78 %
IC-PRE: 2.3 L
IC-PRED: 2.91 L
LDLC SERPL DIRECT ASSAY-MCNC: 198 MG/DL
VA-%PRED-PRE: 65 %
VA-PRE: 3.7 L
VC-%PRED-PRE: 70 %
VC-PRE: 2.49 L
VC-PRED: 3.55 L

## 2017-05-15 ENCOUNTER — OFFICE VISIT (OUTPATIENT)
Dept: ENDOCRINOLOGY | Facility: CLINIC | Age: 67
End: 2017-05-15

## 2017-05-15 VITALS
SYSTOLIC BLOOD PRESSURE: 146 MMHG | HEART RATE: 70 BPM | BODY MASS INDEX: 29.67 KG/M2 | WEIGHT: 192.3 LBS | DIASTOLIC BLOOD PRESSURE: 77 MMHG

## 2017-05-15 DIAGNOSIS — E89.0 POSTABLATIVE HYPOTHYROIDISM: Primary | ICD-10-CM

## 2017-05-15 DIAGNOSIS — C73 PAPILLARY CARCINOMA OF THYROID (H): ICD-10-CM

## 2017-05-15 DIAGNOSIS — E83.51 HYPOCALCEMIA: ICD-10-CM

## 2017-05-15 LAB
ANION GAP SERPL CALCULATED.3IONS-SCNC: 10 MMOL/L (ref 3–14)
BUN SERPL-MCNC: 36 MG/DL (ref 7–30)
CALCIUM SERPL-MCNC: 9.7 MG/DL (ref 8.5–10.1)
CHLORIDE SERPL-SCNC: 102 MMOL/L (ref 94–109)
CHOLEST SERPL-MCNC: 322 MG/DL
CO2 SERPL-SCNC: 26 MMOL/L (ref 20–32)
CREAT SERPL-MCNC: 1.53 MG/DL (ref 0.52–1.04)
GFR SERPL CREATININE-BSD FRML MDRD: 34 ML/MIN/1.7M2
GLUCOSE SERPL-MCNC: 104 MG/DL (ref 70–99)
HDLC SERPL-MCNC: 48 MG/DL
LDLC SERPL CALC-MCNC: ABNORMAL MG/DL
NONHDLC SERPL-MCNC: 274 MG/DL
POTASSIUM SERPL-SCNC: 4.2 MMOL/L (ref 3.4–5.3)
SODIUM SERPL-SCNC: 138 MMOL/L (ref 133–144)
TRIGL SERPL-MCNC: 448 MG/DL

## 2017-05-15 RX ORDER — LEVOTHYROXINE SODIUM 150 UG/1
TABLET ORAL
Qty: 96 TABLET | Refills: 3 | Status: SHIPPED | OUTPATIENT
Start: 2017-05-15 | End: 2018-05-18

## 2017-05-15 RX ORDER — CALCITRIOL 0.5 UG/1
0.5 CAPSULE, LIQUID FILLED ORAL DAILY
Qty: 90 CAPSULE | Refills: 3 | Status: SHIPPED | OUTPATIENT
Start: 2017-05-15 | End: 2018-05-23

## 2017-05-15 NOTE — PROGRESS NOTES
The patient returns to the clinic for follow-up of papillary thyroid cancer, hypocalcemia.     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   She continues to take 150  g levothyroxine daily 6 days a week and 1 1/2 tablets one day a week, 0.5  g calcitriol daily. Denies taking calcium supplements. She reports having a yogurt and 2-3 glasses of milk daily. She does take a daily eye multivitamin.     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 Overall, she reports feeling better, with better energy levels. She remains on voriconazole and her asumption is that she is going to be treated with this lifelong.    1. Papillary thyroid cancer, S/P total thyroidectomy and central neck dissection on March 2nd 2010. Follicular variant, solitary, 3 cm in greatest diameter, with no capsular invasion and negative central neck lymph nodes (0/18 lymph nodes).  MACIS score 5.78.     The WBS done on 12/9/2010 revealed 3 vaque foci of uptake in the L neck. Total iodine uptake was 0.2%.   She underwent radioablation with 159.2 mCi I 131 on 1/27/2010. The WBS done 1 week post treatment revealed 3 foci of uptake in the L neck, which were previously seen on the pretreatment scan.     11/5/12 neck US - normal appearing lymph nodes levels 2 B/L and 3 LN at left level 3   6/7/12 posttreatment WBS - negative   6/25/13 neck US - didn't identify lymph nodes with features suspicious of malignancy. A right level II lymph node appeared to be mildly larger compared with prior images from 2010. Two left level III lymph nodes were again noted, of normal size.   5/19/14 and 6/14/2016 neck USs  - no definite suspicious looking lymph nodes    Thyroglobulin antibodies were negative. I reviewed prior tyroglobulin levels:  5/5/10        0.21            TSH 7.48   10/25/10    <0.1            TSH  2.92  12/08/10     8.3               12/10/10     3.1             TSH 58.6   8/4/11         <0.1         TSH 0.03  6/8/12          <0.1         TSH 75.1  6/25/13        <0.1          TSH 12.8  3/5/14          <0.1          TSH 6.32   4/15/15         <0.1          TSH 2.4  5/24/16         <0.1           TSH 3.65     2. Hypocalcemia - requiring treatment with calcitriol, in the context of CKD with a recent GFR in the 30s.     Most recent calcium level was 9.7 this month.      Prescription Medications as of 5/15/2017             voriconazole (VFEND) 200 MG tablet Take 1 tablet (200 mg) by mouth 2 times daily    apixaban ANTICOAGULANT (ELIQUIS) 5 MG tablet Take 1 tablet (5 mg) by mouth 2 times daily    metoprolol (TOPROL-XL) 25 MG 24 hr tablet Take 1 tablet (25 mg) by mouth daily    predniSONE (DELTASONE) 5 MG tablet Take 1 tablet (5 mg) by mouth daily    triamcinolone (KENALOG) 0.1 % cream Apply cream twice daily to foot    aspirin 81 MG tablet Take 81 mg by mouth    RAPAMUNE 0.5 MG PO TABLET Take 0.5 mg by mouth every other day    fluticasone-vilanterol (BREO ELLIPTA) 100-25 MCG/INH oral inhaler Inhale 1 puff into the lungs daily Reported on 4/25/2017    albuterol (ALBUTEROL) 108 (90 BASE) MCG/ACT Inhaler Inhale 2 puffs into the lungs every 4 hours as needed for shortness of breath / dyspnea or wheezing Reported on 4/25/2017    clotrimazole (LOTRIMIN) 1 % cream Apply topically 2 times daily Reported on 4/25/2017    losartan (COZAAR) 25 MG tablet Take 1 tablet (25 mg) by mouth daily    ursodiol (ACTIGALL) 250 MG tablet Take 1 tablet (250 mg) by mouth 2 times daily    Wheat Dextrin (BENEFIBER) POWD 2 teaspoons daily    SUMAtriptan (IMITREX) 50 MG tablet Take 1 tablet (50 mg) by mouth at onset of headache for migraine repeat after 2 hours if needed.    meclizine (ANTIVERT) 25 MG tablet Take 1 tablet (25 mg) by mouth as needed    levothyroxine (SYNTHROID, LEVOTHROID) 150 MCG tablet Take one tablet daily 6 days a  week and one and a half tablets one day a week.    folic acid (FOLVITE) 1 MG tablet Take 1 tablet (1 mg) by mouth daily    furosemide (LASIX) 20 MG tablet Take 1 tablet (20 mg) by mouth every other day    STATIN NOT PRESCRIBED, INTENTIONAL, Statin not prescribed intentionally due to Intolerance (with supporting documentation of trying a statin at least once within the last 5 years)    Multiple Vitamins-Minerals (PRESERVISION AREDS 2) CAPS Take 2 capsules by mouth daily    calcitRIOL (ROCALTROL) 0.25 MCG capsule Take 2 tablets daily    triamcinolone (KENALOG) 0.1 % cream Apply topically 2 times daily Apply to rash    Hypromellose (ARTIFICIAL TEARS OP) Apply 1 drop to eye as needed    acetaminophen 500 MG CAPS Take 500 mg by mouth as needed Take 500-1,000 mg by mouth every 6 hours if needed.    magnesium 250 MG tablet Take 2 tablets by mouth daily At night.     aspirin 81 MG EC tablet Take 1 tablet by mouth daily.        PAST MEDICAL HISTORY:   Primary biliary cirrhosis s/p transplant - may 2002.   Anemia of chronic disease.   Hypothyroidism for 30 years treated with levothyroxine   Post menopausal.   CVA in , symptoms have resolved.   VRE in the stool.   Sjogren syndrome, diagnosed in the .   Migraines.   UTIs.   Superficial phlebitis.   History of esophageal varices.   Varicose veins.   Heart murmur.  On dyalisis while in coma for hepatic encephalopathy for 18 days  CKD with an average creatinine of 1.3.    Osteoporosis  Sepsis of unclear etiology  and    Fatty pancreas on CT  Diverticulosis   Kidney stone -   Emphysema   Atrial fibrillation     PAST SURGICAL HISTORY:   Orthotopic liver transplant in .   Sclerotherapy for varicose veins in 2008.   Vein stripping for varicose veins in .   Laparoscopic cholecystectomy in .   Appendectomy in .   A benign tumor removal of her right knee in .   L Tympanoplasty in .  Cataract surgery      FH:  Father  of prostate cancer.  Mother has HTN, GERD. Both paternal grandmother and paternal grandfather had strokes later in life. Maternal grandmother had colon surgery (?cancer) and myocardial infarct at age 97.     SH.   . She has 2 children and 2 stepchildren. Occupation: retired medical social worker. She denies smoking, drinking alcohol or using illicit drugs.    Review of Systems   Systemic:             Fatigue - longstanding and improving, weight stable   Eye:                      No eye symptoms   Aracelis-Laryngeal:     no dysphagia, no hoarseness, improved dry cough  Breast:                  No breast symptoms  Cardiovascular:    No cardiovascular symptoms, no CP or palpitations   Pulmonary:           SOB with exertion   Gastrointestinal:   No N/V, no diarrhea or constipation; take fiber daily   Genitourinary:       No genitourinary symptoms, no increased thirst or urination   Endocrine:            cold intolerance with no changes over the years   Neurological:        rare headaches, no tremor, no dizziness   Musculoskeletal:   LBP joint pain     Skin:                     Swollen feet - wears compression sockings, dry skin, no hair loss   Psychological:     No psychological symptoms    Wt Readings from Last 10 Encounters:   05/15/17 87.2 kg (192 lb 4.8 oz)   05/08/17 87.4 kg (192 lb 9.6 oz)   05/02/17 88.5 kg (195 lb)   04/24/17 88.9 kg (196 lb)   04/19/17 89 kg (196 lb 3.2 oz)   09/21/16 91.1 kg (200 lb 14.4 oz)   09/06/16 90.2 kg (198 lb 12.8 oz)   08/11/16 89.8 kg (198 lb)   07/27/16 89.9 kg (198 lb 4.8 oz)   07/15/16 89.6 kg (197 lb 9.6 oz)     /79  Pulse 73  Wt 87.2 kg (192 lb 4.8 oz)  BMI 29.67 kg/m2  144/77 on recheck     General appearance: well-nourished, no distress noted, pale.  Eyes: conjunctivae and extraocular motions are normal. Pupils are equal, round, and reactive to light. No lid lag, no stare.  HENT: oropharynx clear and moist; neck no JVD, no bruits, palpable 1-2 cm right level 2 lymph node, mobile and  stable since last exam   Cardiovascular: regular rhythm, + systolic murmurs, distal pulses palpable, very mild lower extremities edema  Respiratory: chest clear, no rales, no rhonchi  Gastrointestinal: abdomen soft, nontender, nondistended, +BS, no organomegaly  Musculoskeletal: normal tone and strength, no lower extremity edema,   Neurologic: no resting tremor, knee reflexes - unable to elicit  Psychiatric: affect and judgment normal   Skin: warm, vitiligo, lower extremities superficial varices B/L       Labs:   I reviewed prior lab results documented in EPIC.   TSH   Date Value Ref Range Status   05/10/2017 4.74 (H) 0.40 - 4.00 mU/L Final     T4 Total   Date Value Ref Range Status   09/23/2014 9.1 5.0 - 11.0 ug/dL Final     T4 Free   Date Value Ref Range Status   05/10/2017 1.48 (H) 0.76 - 1.46 ng/dL Final      Ref. Range 5/24/2016 08:46   Calcium Latest Ref Range: 8.5 - 10.1 mg/dL 8.8   GFR Estimate Latest Ref Range: >60 mL/min/1.7m2 29 (L)   Phosphorus Latest Ref Range: 2.5 - 4.5 mg/dL 3.2   Albumin Latest Ref Range: 3.4 - 5.0 g/dL 3.3 (L)   Parathyroid Hormone Intact Latest Ref Range: 12 - 72 pg/mL 60      Ref. Range 5/10/2017 09:29   Calcium Latest Ref Range: 8.5 - 10.1 mg/dL 9.7   GFR Estimate Latest Ref Range: >60 mL/min/1.7m2 34 (L)   Phosphorus Latest Ref Range: 2.5 - 4.5 mg/dL 3.1   Albumin Latest Ref Range: 3.4 - 5.0 g/dL 3.7     Assessment/Plan:    1. Papillary thyroid cancer s/p total thyroidectomy, central neck dissection and radioablation. Post treatment scan in 6/12 was negative for recurrence. Thyrogen stimulated Tg level was undetectable in June 2012. The nonstimulated thyroglobulin remained undetectable. On the most recent neck ultrasound from 2016, there were no suspicious looking lymph nodes.  We're going to continue to monitor the thyroglobulin on an yearly basis.     2. Postsurgical hypothyroidism   On the most recent lab work, the TSH was minimally elevated, while free T4 was at the upper  end of normal range. She had this issue in the past, intermittently, which goes against an inappropriate conversion of T4 into T3. On questioning, the patient admits not always taking the levothyroxine 1 hour prior to breakfast. She does wake up to use the restroom around 4-5 AM, most of the days. Advised to take levothyroxine at that time and have the thyroid hormone levels rechecked with her next lab work, in approximately 2 months.     3. Hypocalcemia  Calcitriol was initially started in 2010 for management of postsurgical hypocalcemia, presumed to be due to hypoparathyroidism. The PTH level increased after surgery, but the hypocalcemia persisted, requiring tx with calcitriol despite normal 25 OH vitamin D levels. In 2012, it was aggravated by malabsorption, hypomagnesemia. It is now controlled with calcitriol. In view of the normal calcium and phosphorus levels, I recommended to continue to take the same dose of calcitriol of 0.5 mcg daily.    Orders Placed This Encounter   Procedures     TSH     T4 free     Thyroglobulin and antibody     Answers for HPI/ROS submitted by the patient on 5/3/2017   General Symptoms: Yes  Skin Symptoms: No  HENT Symptoms: No  EYE SYMPTOMS: No  HEART SYMPTOMS: Yes  LUNG SYMPTOMS: Yes  INTESTINAL SYMPTOMS: No  URINARY SYMPTOMS: No  BREAST SYMPTOMS: No  GYNECOLOGIC SYMPTOMS: No  SKELETAL SYMPTOMS: Yes  BLOOD SYMPTOMS: No  NERVOUS SYSTEM SYMPTOMS: No  MENTAL HEALTH SYMPTOMS: Yes  Fever: No  Loss of appetite: No  Weight loss: No  Weight gain: No  Fatigue: Yes  Night sweats: Yes  Chills: No  Increased stress: Yes  Excessive hunger: No  Excessive thirst: No  Feeling hot or cold when others believe the temperature is normal: Yes  Loss of height: No  Post-operative complications: No  Surgical site pain: No  Hallucinations: No  Change in or Loss of Energy: Yes  Hyperactivity: No  Confusion: No  Cough: Yes  Sputum or phlegm: No  Coughing up blood: No  Difficulty breating or shortness of  breath: Yes  Snoring: Yes  Wheezing: No  Difficulty breathing on exertion: Yes  Respiratory pain: No  Nighttime Cough: No  Difficulty breathing when lying flat: No  Chest pain or pressure: No  Fast or irregular heartbeat: Yes  Pain in legs with walking: No  Swelling in feet or ankles: Yes  Trouble breathing while lying down: No  Fingers or Toes appear blue: Yes  High blood pressure: Yes  Low blood pressure: No  Fainting: No  Murmurs: Yes  Chest pain on exertion: No  Chest pain at rest: No  Cramping pain in leg during exercise: No  Pacemaker: No  Varicose veins: Yes  Edema or swelling: Yes  Fast heart beat: Yes  Wake up at night with shortness of breath: No  Heart flutters: No  Light-headedness: No  Exercise intolerance: No  Back pain: Yes  Muscle aches: Yes  Neck pain: No  Swollen joints: No  Joint pain: Yes  Bone pain: Yes  Muscle cramps: No  Muscle weakness: No  Joint stiffness: Yes  Bone fracture: No  Nervous or Anxious: Yes  Depression: No  Trouble sleeping: No  Trouble thinking or concentrating: No  Mood changes: No  Panic attacks: No

## 2017-05-15 NOTE — LETTER
5/15/2017       RE: Luz Thompson  [address]  [facility] [address]     Dear Colleague,    Thank you for referring your patient, Luz Thompson, to the [facility] END[facility]OLOGY at [facility]. Please see a copy of my visit note below.    The patient returns to the clinic for follow-up of papillary thyroid cancer, hypocalcemia.     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   She continues to take 150  g levothyroxine daily 6 days a week and 1 1/2 tablets one day a week, 0.5  g calcitriol daily. Denies taking calcium supplements. She reports having a yogurt and 2-3 glasses of milk daily. She does take a daily eye multivitamin.     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 Overall, she reports feeling better, with better energy levels. She remains on voriconazole and her asumption is that she is going to be treated with this lifelong.    1. Papillary thyroid cancer, S/P total thyroidectomy and central neck dissection on March 2nd 2010. Follicular variant, solitary, 3 cm in greatest diameter, with no capsular invasion and negative central neck lymph nodes (0/18 lymph nodes).  MACIS score 5.78.     The WBS done on 12/9/2010 revealed 3 vaque foci of uptake in the L neck. Total iodine uptake was 0.2%.   She underwent radioablation with 159.2 mCi I 131 on 1/27/2010. The WBS done 1 week post treatment revealed 3 foci of uptake in the L neck, which were previously seen on the pretreatment scan.     11/5/12 neck US - normal appearing lymph nodes levels 2 B/L and 3 LN at left level 3   6/7/12 posttreatment WBS - negative   6/25/13 neck US - didn't identify lymph nodes with features suspicious of malignancy. A right level II lymph node appeared to be mildly larger compared with prior images from 2010. Two left  level III lymph nodes were again noted, of normal size.   5/19/14 and 6/14/2016 neck USs  - no definite suspicious looking lymph nodes    Thyroglobulin antibodies were negative. I reviewed prior tyroglobulin levels:  5/5/10        0.21            TSH 7.48   10/25/10    <0.1            TSH 2.92  12/08/10     8.3               12/10/10     3.1             TSH 58.6   8/4/11         <0.1         TSH 0.03  6/8/12          <0.1         TSH 75.1  6/25/13        <0.1          TSH 12.8  3/5/14          <0.1          TSH 6.32   4/15/15         <0.1          TSH 2.4  5/24/16         <0.1           TSH 3.65     2. Hypocalcemia - requiring treatment with calcitriol, in the context of CKD with a recent GFR in the 30s.     Most recent calcium level was 9.7 this month.      Prescription Medications as of 5/15/2017             voriconazole (VFEND) 200 MG tablet Take 1 tablet (200 mg) by mouth 2 times daily    apixaban ANTICOAGULANT (ELIQUIS) 5 MG tablet Take 1 tablet (5 mg) by mouth 2 times daily    metoprolol (TOPROL-XL) 25 MG 24 hr tablet Take 1 tablet (25 mg) by mouth daily    predniSONE (DELTASONE) 5 MG tablet Take 1 tablet (5 mg) by mouth daily    triamcinolone (KENALOG) 0.1 % cream Apply cream twice daily to foot    aspirin 81 MG tablet Take 81 mg by mouth    RAPAMUNE 0.5 MG PO TABLET Take 0.5 mg by mouth every other day    fluticasone-vilanterol (BREO ELLIPTA) 100-25 MCG/INH oral inhaler Inhale 1 puff into the lungs daily Reported on 4/25/2017    albuterol (ALBUTEROL) 108 (90 BASE) MCG/ACT Inhaler Inhale 2 puffs into the lungs every 4 hours as needed for shortness of breath / dyspnea or wheezing Reported on 4/25/2017    clotrimazole (LOTRIMIN) 1 % cream Apply topically 2 times daily Reported on 4/25/2017    losartan (COZAAR) 25 MG tablet Take 1 tablet (25 mg) by mouth daily    ursodiol (ACTIGALL) 250 MG tablet Take 1 tablet (250 mg) by mouth 2 times daily    Wheat Dextrin (BENEFIBER) POWD 2 teaspoons daily     SUMAtriptan (IMITREX) 50 MG tablet Take 1 tablet (50 mg) by mouth at onset of headache for migraine repeat after 2 hours if needed.    meclizine (ANTIVERT) 25 MG tablet Take 1 tablet (25 mg) by mouth as needed    levothyroxine (SYNTHROID, LEVOTHROID) 150 MCG tablet Take one tablet daily 6 days a week and one and a half tablets one day a week.    folic acid (FOLVITE) 1 MG tablet Take 1 tablet (1 mg) by mouth daily    furosemide (LASIX) 20 MG tablet Take 1 tablet (20 mg) by mouth every other day    STATIN NOT PRESCRIBED, INTENTIONAL, Statin not prescribed intentionally due to Intolerance (with supporting documentation of trying a statin at least once within the last 5 years)    Multiple Vitamins-Minerals (PRESERVISION AREDS 2) CAPS Take 2 capsules by mouth daily    calcitRIOL (ROCALTROL) 0.25 MCG capsule Take 2 tablets daily    triamcinolone (KENALOG) 0.1 % cream Apply topically 2 times daily Apply to rash    Hypromellose (ARTIFICIAL TEARS OP) Apply 1 drop to eye as needed    acetaminophen 500 MG CAPS Take 500 mg by mouth as needed Take 500-1,000 mg by mouth every 6 hours if needed.    magnesium 250 MG tablet Take 2 tablets by mouth daily At night.     aspirin 81 MG EC tablet Take 1 tablet by mouth daily.        PAST MEDICAL HISTORY:   Primary biliary cirrhosis s/p transplant - may 2002.   Anemia of chronic disease.   Hypothyroidism for 30 years treated with levothyroxine   Post menopausal.   CVA in 2001, symptoms have resolved.   VRE in the stool.   Sjogren syndrome, diagnosed in the 1990s.   Migraines.   UTIs.   Superficial phlebitis.   History of esophageal varices.   Varicose veins.   Heart murmur.  On dyalisis while in coma for hepatic encephalopathy for 18 days  CKD with an average creatinine of 1.3.    Osteoporosis  Sepsis of unclear etiology 2013 and 2014   Fatty pancreas on CT  Diverticulosis   Kidney stone - 2013  Emphysema   Atrial fibrillation     PAST SURGICAL HISTORY:   Orthotopic liver transplant in  .   Sclerotherapy for varicose veins in 2008.   Vein stripping for varicose veins in .   Laparoscopic cholecystectomy in .   Appendectomy in .   A benign tumor removal of her right knee in .   L Tympanoplasty in .  Cataract surgery      FH:  Father  of prostate cancer. Mother has HTN, GERD. Both paternal grandmother and paternal grandfather had strokes later in life. Maternal grandmother had colon surgery (?cancer) and myocardial infarct at age 97.     SH.   . She has 2 children and 2 stepchildren. Occupation: retired medical social worker. She denies smoking, drinking alcohol or using illicit drugs.    Review of Systems   Systemic:             Fatigue - longstanding and improving, weight stable   Eye:                      No eye symptoms   Aracelis-Laryngeal:     no dysphagia, no hoarseness, improved dry cough  Breast:                  No breast symptoms  Cardiovascular:    No cardiovascular symptoms, no CP or palpitations   Pulmonary:           SOB with exertion   Gastrointestinal:   No N/V, no diarrhea or constipation; take fiber daily   Genitourinary:       No genitourinary symptoms, no increased thirst or urination   Endocrine:            cold intolerance with no changes over the years   Neurological:        rare headaches, no tremor, no dizziness   Musculoskeletal:   LBP joint pain     Skin:                     Swollen feet - wears compression sockings, dry skin, no hair loss   Psychological:     No psychological symptoms    Wt Readings from Last 10 Encounters:   05/15/17 87.2 kg (192 lb 4.8 oz)   17 87.4 kg (192 lb 9.6 oz)   17 88.5 kg (195 lb)   17 88.9 kg (196 lb)   17 89 kg (196 lb 3.2 oz)   16 91.1 kg (200 lb 14.4 oz)   16 90.2 kg (198 lb 12.8 oz)   16 89.8 kg (198 lb)   16 89.9 kg (198 lb 4.8 oz)   07/15/16 89.6 kg (197 lb 9.6 oz)     /79  Pulse 73  Wt 87.2 kg (192 lb 4.8 oz)  BMI 29.67 kg/m2  144/77 on recheck      General appearance: well-nourished, no distress noted, pale.  Eyes: conjunctivae and extraocular motions are normal. Pupils are equal, round, and reactive to light. No lid lag, no stare.  HENT: oropharynx clear and moist; neck no JVD, no bruits, palpable 1-2 cm right level 2 lymph node, mobile and stable since last exam   Cardiovascular: regular rhythm, + systolic murmurs, distal pulses palpable, very mild lower extremities edema  Respiratory: chest clear, no rales, no rhonchi  Gastrointestinal: abdomen soft, nontender, nondistended, +BS, no organomegaly  Musculoskeletal: normal tone and strength, no lower extremity edema,   Neurologic: no resting tremor, knee reflexes - unable to elicit  Psychiatric: affect and judgment normal   Skin: warm, vitiligo, lower extremities superficial varices B/L       Labs:   I reviewed prior lab results documented in EPIC.   TSH   Date Value Ref Range Status   05/10/2017 4.74 (H) 0.40 - 4.00 mU/L Final     T4 Total   Date Value Ref Range Status   09/23/2014 9.1 5.0 - 11.0 ug/dL Final     T4 Free   Date Value Ref Range Status   05/10/2017 1.48 (H) 0.76 - 1.46 ng/dL Final      Ref. Range 5/24/2016 08:46   Calcium Latest Ref Range: 8.5 - 10.1 mg/dL 8.8   GFR Estimate Latest Ref Range: >60 mL/min/1.7m2 29 (L)   Phosphorus Latest Ref Range: 2.5 - 4.5 mg/dL 3.2   Albumin Latest Ref Range: 3.4 - 5.0 g/dL 3.3 (L)   Parathyroid Hormone Intact Latest Ref Range: 12 - 72 pg/mL 60      Ref. Range 5/10/2017 09:29   Calcium Latest Ref Range: 8.5 - 10.1 mg/dL 9.7   GFR Estimate Latest Ref Range: >60 mL/min/1.7m2 34 (L)   Phosphorus Latest Ref Range: 2.5 - 4.5 mg/dL 3.1   Albumin Latest Ref Range: 3.4 - 5.0 g/dL 3.7     Assessment/Plan:    1. Papillary thyroid cancer s/p total thyroidectomy, central neck dissection and radioablation. Post treatment scan in 6/12 was negative for recurrence. Thyrogen stimulated Tg level was undetectable in June 2012. The nonstimulated thyroglobulin remained  undetectable. On the most recent neck ultrasound from 2016, there were no suspicious looking lymph nodes.  We're going to continue to monitor the thyroglobulin on an yearly basis.     2. Postsurgical hypothyroidism   On the most recent lab work, the TSH was minimally elevated, while free T4 was at the upper end of normal range. She had this issue in the past, intermittently, which goes against an inappropriate conversion of T4 into T3. On questioning, the patient admits not always taking the levothyroxine 1 hour prior to breakfast. She does wake up to use the restroom around 4-5 AM, most of the days. Advised to take levothyroxine at that time and have the thyroid hormone levels rechecked with her next lab work, in approximately 2 months.     3. Hypocalcemia  Calcitriol was initially started in 2010 for management of postsurgical hypocalcemia, presumed to be due to hypoparathyroidism. The PTH level increased after surgery, but the hypocalcemia persisted, requiring tx with calcitriol despite normal 25 OH vitamin D levels. In 2012, it was aggravated by malabsorption, hypomagnesemia. It is now controlled with calcitriol. In view of the normal calcium and phosphorus levels, I recommended to continue to take the same dose of calcitriol of 0.5 mcg daily.    Orders Placed This Encounter   Procedures     TSH     T4 free     Thyroglobulin and antibody       Again, thank you for allowing me to participate in the care of your patient.      Sincerely,    Rach Vasquez MD

## 2017-05-15 NOTE — TELEPHONE ENCOUNTER
PA Initiation    Medication: Voriconazole   Insurance Company: [employer] - Phone [phone] Fax [phone]  Pharmacy Filling the Rx: [facility] # [id number] [place], [place] - [address]  Filling Pharmacy Phone: [phone]  Filling Pharmacy Fax:    Start Date: 5/15/2017

## 2017-05-15 NOTE — MR AVS SNAPSHOT
After Visit Summary   5/15/2017    Luz Thompson    MRN: [id number]           Patient Information     Date Of Birth          1950        Visit Information        Provider Department      5/15/2017 11:00 AM Rach Vasquez MD [facility]        Today's Diagnoses     Postablative hypothyroidism    -  1    Papillary carcinoma of thyroid (H)        Hypocalcemia           Follow-ups after your visit        Your next 10 appointments already scheduled     May 23, 2017 12:00 PM CDT   (Arrive by 11:45 AM)   Return Visit with Mavis Bermudez MD   [facility] Dermatology ([facility])    [address] [place] [address]  3rd [facility] [address]   [phone]            May 24, 2017 10:00 AM CDT   (Arrive by 9:30 AM)   Return Visit with Crystal Montero MD   [facility] ([facility])    [address] [place] [address]  3rd [facility] [address]   [phone]            Jul 12, 2017  1:00 PM CDT   (Arrive by 12:45 PM)   New Patient Visit with Randell Reyna MD   [facility] Heart Care ([facility])    [address] [place] [address]   [phone]            Jul 31, 2017 10:00 AM CDT   (Arrive by 9:45 AM)   CT CHEST W/O CONTRAST with UCCT1   [facility] [place] [facility] ([facility])    [address] [place] [address]  1st [facility] [address]   [phone]           Please bring any scans or X-rays taken at other hospitals, if similar tests were done. Also bring a list of your medicines, including vitamins, minerals and over-the-counter drugs. It is safest to leave personal items at home.  Be sure to tell your doctor:   If you have any allergies.   If there s any chance you are pregnant.   If you are breastfeeding.   If you have any special needs.  You do not need to do anything special to prepare.  Please wear  loose clothing, such as a sweat suit or jogging clothes. Avoid snaps, zippers and other metal. We may ask you to undress and put on a hospital gown.            Jul 31, 2017 10:30 AM CDT   (Arrive by 10:15 AM)   Return Visit with Eden Clinton MD   [facility] ([facility] [place])    [address] [place] [id number] [phone]            Aug 14, 2017  1:00 PM CDT   (Arrive by 12:45 PM)   ATRIAL FIBULATION VISIT with Graham Gilmore MD   [facility] [place] [facility] ([facility])    [address] [place] [address] [phone]            Sep 19, 2017 11:15 AM CDT   (Arrive by 11:00 AM)   Return General Liver with Gentry Ramirez MD   [facility] Hepatology ([facility])    [address] [place] [id number] [phone]              Future tests that were ordered for you today     Open Future Orders        Priority Expected Expires Ordered    TSH Routine 7/15/2017 5/15/2018 5/15/2017    T4 free Routine 7/15/2017 5/15/2018 5/15/2017    Thyroglobulin and antibody Routine 7/15/2017 5/15/2018 5/15/2017            Who to contact     Please call your clinic at [phone] to:    Ask questions about your health    Make or cancel appointments    Discuss your medicines    Learn about your test results    Speak to your doctor   If you have compliments or concerns about an experience at your clinic, or if you wish to file a complaint, please contact [facility] Patient Relations at [phone] or email us at [email or web address]         Additional Information About Your Visit        MyChart Information     [employer] gives you secure access to your electronic health record. If you see a primary care provider, you can also send messages to your care team and make appointments. If you have questions, please call your  primary care clinic.  If you do not have a primary care provider, please call [phone] and they will assist you.      [employer] is an electronic gateway that provides easy, online access to your medical records. With [employer], you can request a clinic appointment, read your test results, renew a prescription or communicate with your care team.     To access your existing account, please contact your [facility] or call [phone] for assistance.        Care EveryWhere ID     This is your Care EveryWhere ID. This could be used by other organizations to access your [facility] medical records  [id number]        Your Vitals Were     Pulse BMI (Body Mass Index)                70 29.67 kg/m2           Blood Pressure from Last 3 Encounters:   05/15/17 146/77   05/08/17 148/80   04/24/17 154/84    Weight from Last 3 Encounters:   05/15/17 87.2 kg (192 lb 4.8 oz)   05/08/17 87.4 kg (192 lb 9.6 oz)   05/02/17 88.5 kg (195 lb)                 Today's Medication Changes          These changes are accurate as of: 5/15/17 12:08 PM.  If you have any questions, ask your nurse or doctor.               These medicines have changed or have updated prescriptions.        Dose/Directions    * calcitRIOL 0.25 MCG capsule   Commonly known as:  ROCALTROL   This may have changed:  Another medication with the same name was added. Make sure you understand how and when to take each.        Take 2 tablets daily   Quantity:  180 capsule   Refills:  3       * calcitRIOL 0.5 MCG capsule   Commonly known as:  ROCALTROL   This may have changed:  You were already taking a medication with the same name, and this prescription was added. Make sure you understand how and when to take each.        Dose:  0.5 mcg   Take 1 capsule (0.5 mcg) by mouth daily   Quantity:  90 capsule   Refills:  3       * Notice:  This list has 2 medication(s) that are the same as other medications prescribed for you. Read the directions  carefully, and ask your doctor or other care provider to review them with you.         Where to get your medicines      These medications were sent to [facility] [id number] [place] - [address]  [address], [facility] [address]     Phone:  [phone]     calcitRIOL 0.5 MCG capsule    levothyroxine 150 MCG tablet                Primary Care Provider Office Phone # Fax #    Jennifer Amparo Barraza [phone]       [facility] [address] [place]  [facility] [address]        Thank you!     Thank you for choosing [facility] ENDOCRINOLOGY  for your care. Our goal is always to provide you with excellent care. Hearing back from our patients is one way we can continue to improve our services. Please take a few minutes to complete the written survey that you may receive in the mail after your visit with us. Thank you!             Your Updated Medication List - Protect others around you: Learn how to safely use, store and throw away your medicines at www.disposemymeds.org.          This list is accurate as of: 5/15/17 12:08 PM.  Always use your most recent med list.                   Brand Name Dispense Instructions for use    acetaminophen 500 MG Caps      Take 500 mg by mouth as needed Take 500-1,000 mg by mouth every 6 hours if needed.       albuterol 108 (90 BASE) MCG/ACT Inhaler   Generic drug:  albuterol      Inhale 2 puffs into the lungs every 4 hours as needed for shortness of breath / dyspnea or wheezing Reported on 4/25/2017       apixaban ANTICOAGULANT 5 MG tablet    ELIQUIS    60 tablet    Take 1 tablet (5 mg) by mouth 2 times daily       ARTIFICIAL TEARS OP      Apply 1 drop to eye as needed       * aspirin 81 MG tablet      Take 81 mg by mouth       * aspirin 81 MG EC tablet      Take 1 tablet by mouth daily.       BENEFIBER Powd      2 teaspoons daily       BREO ELLIPTA 100-25 MCG/INH oral inhaler   Generic drug:  fluticasone-vilanterol      Inhale 1 puff into  the lungs daily Reported on 4/25/2017       * calcitRIOL 0.25 MCG capsule    ROCALTROL    180 capsule    Take 2 tablets daily       * calcitRIOL 0.5 MCG capsule    ROCALTROL    90 capsule    Take 1 capsule (0.5 mcg) by mouth daily       clotrimazole 1 % cream    LOTRIMIN     Apply topically 2 times daily Reported on 4/25/2017       folic acid 1 MG tablet    FOLVITE    90 tablet    Take 1 tablet (1 mg) by mouth daily       furosemide 20 MG tablet    LASIX    90 tablet    Take 1 tablet (20 mg) by mouth every other day       levothyroxine 150 MCG tablet    SYNTHROID/LEVOTHROID    96 tablet    Take one tablet daily 6 days a week and one and a half tablets one day a week.       losartan 25 MG tablet    COZAAR    90 tablet    Take 1 tablet (25 mg) by mouth daily       magnesium 250 MG tablet      Take 2 tablets by mouth daily At night.       meclizine 25 MG tablet    ANTIVERT    30 tablet    Take 1 tablet (25 mg) by mouth as needed       metoprolol 25 MG 24 hr tablet    TOPROL-XL    30 tablet    Take 1 tablet (25 mg) by mouth daily       predniSONE 5 MG tablet    DELTASONE    90 tablet    Take 1 tablet (5 mg) by mouth daily       PRESERVISION AREDS 2 Caps      Take 2 capsules by mouth daily       sirolimus Tabs tablet     15 tablet    Take 0.5 mg by mouth every other day       STATIN NOT PRESCRIBED (INTENTIONAL)     0 each    Statin not prescribed intentionally due to Intolerance (with supporting documentation of trying a statin at least once within the last 5 years)       SUMAtriptan 50 MG tablet    IMITREX    30 tablet    Take 1 tablet (50 mg) by mouth at onset of headache for migraine repeat after 2 hours if needed.       * triamcinolone 0.1 % cream    KENALOG    80 g    Apply topically 2 times daily Apply to rash       * triamcinolone 0.1 % cream    KENALOG    80 g    Apply cream twice daily to foot       ursodiol 250 MG tablet    ACTIGALL    180 tablet    Take 1 tablet (250 mg) by mouth 2 times daily        voriconazole 200 MG tablet    VFEND    60 tablet    Take 1 tablet (200 mg) by mouth 2 times daily       * Notice:  This list has 6 medication(s) that are the same as other medications prescribed for you. Read the directions carefully, and ask your doctor or other care provider to review them with you.

## 2017-05-16 LAB
DEPRECATED CALCIDIOL+CALCIFEROL SERPL-MC: NORMAL UG/L (ref 20–75)
VITAMIN D2 SERPL-MCNC: <5 UG/L
VITAMIN D3 SERPL-MCNC: 40 UG/L

## 2017-05-16 NOTE — TELEPHONE ENCOUNTER
Prior Authorization Approval    Authorization Effective Date: 5/16/2017  Authorization Expiration Date: 5/16/2019  Medication: Voriconazole   Approved Dose/Quantity: 60   Reference #: [id number] [id number]   Insurance Company: [employer] - Phone [phone] Fax [phone]  Expected CoPay:  CoPay Card Available:  Foundation Assistance Needed:    Which Pharmacy is filling the prescription (Not needed for infusion/clinic administered): [facility] # [id number] - [place], [place] - [address]  Pharmacy Notified: Yes  Patient Notified:

## 2017-05-17 LAB — LAB SCANNED RESULT: NORMAL

## 2017-05-19 DIAGNOSIS — R10.9 ABDOMINAL PAIN: Primary | ICD-10-CM

## 2017-05-23 ENCOUNTER — OFFICE VISIT (OUTPATIENT)
Dept: DERMATOLOGY | Facility: CLINIC | Age: 67
End: 2017-05-23

## 2017-05-23 DIAGNOSIS — L57.0 AK (ACTINIC KERATOSIS): ICD-10-CM

## 2017-05-23 DIAGNOSIS — L30.9 DERMATITIS: Primary | ICD-10-CM

## 2017-05-23 ASSESSMENT — PAIN SCALES - GENERAL: PAINLEVEL: NO PAIN (0)

## 2017-05-23 NOTE — MR AVS SNAPSHOT
After Visit Summary   5/23/2017    Luz Thompson    MRN: [id number]           Patient Information     Date Of Birth          1950        Visit Information        Provider Department      5/23/2017 12:00 PM Mavis Bermudez MD [facility] Dermatology         Follow-ups after your visit        Your next 10 appointments already scheduled     Jul 12, 2017  9:30 AM CDT   RETURN RETINA with Meri Frost MD   Eye Clinic ([facility])    [address]  [address] Clin [address] [place] [address]   [phone]            Jul 12, 2017 10:30 AM CDT   RETURN GENERAL with Dora Tovar MD   Eye Clinic ([facility])    [address]  [address] [address]   [phone]            Jul 12, 2017  1:00 PM CDT   (Arrive by 12:45 PM)   New Patient Visit with Randell Reyna MD   [facility] Heart Care ([facility] [place])    [address] [facility]  3rd [facility] [address] [phone]            Jul 31, 2017  9:20 AM CDT   (Arrive by 9:05 AM)   CT CHEST W/O CONTRAST with UCCT1   [facility] Imaging [place] [facility] ([facility] [place])    [address] [facility]  1st [facility] [address] [phone]           Please bring any scans or X-rays taken at other hospitals, if similar tests were done. Also bring a list of your medicines, including vitamins, minerals and over-the-counter drugs. It is safest to leave personal items at home.  Be sure to tell your doctor:   If you have any allergies.   If there s any chance you are pregnant.   If you are breastfeeding.   If you have any special needs.  You do not need to do anything special to prepare.  Please wear loose clothing, such as a sweat suit or jogging clothes. Avoid snaps, zippers and other metal. We may ask you to undress and put on a hospital gown.            Jul 31, 2017 10:30 AM CDT   (Arrive by 10:15  AM)   Return Visit with Eden Clinton MD   [facility] ([facility])    [address] [facility]  3rd [facility] [address]   [phone]            Jul 31, 2017  1:00 PM CDT   (Arrive by 12:45 PM)   PAC Pharmacist with  Pac Pharmacist   [facility] Preoperative Assessment [place] ([facility])    [address] [facility]  4th [facility] [address]   [phone]            Jul 31, 2017  1:30 PM CDT   (Arrive by 1:15 PM)   PAC EVALUATION with  Pac Deidre 3   [facility] Preoperative Assessment [place] ([facility])    [address] [facility]  4th [facility] [address]   [phone]            Jul 31, 2017  2:30 PM CDT   (Arrive by 2:15 PM)   PAC RN ASSESSMENT with  Pac Rn   [facility] Preoperative Assessment [place] ([facility])    [address] [place] [address]  4th [facility] [address]   [phone]            Jul 31, 2017  3:10 PM CDT   (Arrive by 2:55 PM)   PAC Anesthesia Consult with  Pac Anesthesiologist   [facility] Preoperative Assessment [place] ([facility])    [address] [place] [address]  4th [facility] [address] [phone]            Aug 14, 2017  1:00 PM CDT   (Arrive by 12:45 PM)   ATRIAL FIBULATION VISIT with Graham Gilmore MD   [facility] Heart [facility] ([facility])    [address] [place] [address]  3rd [facility] [address] [phone]              Who to contact     Please call your clinic at [phone] to:    Ask questions about your health    Make or cancel appointments    Discuss your medicines    Learn about your test results    Speak to your doctor   If you have compliments or concerns about an experience at your clinic, or if you wish to file a complaint, please contact [facility] Patient Relations at [phone] or email us at  [email or web address]         Additional Information About Your Visit        MyChart Information     [employer] gives you secure access to your electronic health record. If you see a primary care provider, you can also send messages to your care team and make appointments. If you have questions, please call your primary care clinic.  If you do not have a primary care provider, please call [phone] and they will assist you.      [employer] is an electronic gateway that provides easy, online access to your medical records. With [employer], you can request a clinic appointment, read your test results, renew a prescription or communicate with your care team.     To access your existing account, please contact your [facility] or call [phone] for assistance.        Care EveryWhere ID     This is your Care EveryWhere ID. This could be used by other organizations to access your [facility] medical records  [id number]         Blood Pressure from Last 3 Encounters:   05/24/17 178/65   05/15/17 146/77   05/08/17 148/80    Weight from Last 3 Encounters:   05/24/17 85.9 kg (189 lb 4.8 oz)   05/15/17 87.2 kg (192 lb 4.8 oz)   05/08/17 87.4 kg (192 lb 9.6 oz)              Today, you had the following     No orders found for display       Primary Care Provider Office Phone # Fax #    Jennifer Amparo Barraza [phone]       [facility] [address] [facility] [address]        Thank you!     Thank you for choosing [facility] DERMATOLOGY  for your care. Our goal is always to provide you with excellent care. Hearing back from our patients is one way we can continue to improve our services. Please take a few minutes to complete the written survey that you may receive in the mail after your visit with us. Thank you!             Your Updated Medication List - Protect others around you: Learn how to safely use, store and throw away your medicines at  www.disposemymeds.org.          This list is accurate as of: 5/23/17 11:59 PM.  Always use your most recent med list.                   Brand Name Dispense Instructions for use    acetaminophen 500 MG Caps      Take 500 mg by mouth as needed Take 500-1,000 mg by mouth every 6 hours if needed.       albuterol 108 (90 BASE) MCG/ACT Inhaler   Generic drug:  albuterol      Inhale 2 puffs into the lungs every 4 hours as needed for shortness of breath / dyspnea or wheezing Reported on 4/25/2017       apixaban ANTICOAGULANT 5 MG tablet    ELIQUIS    60 tablet    Take 1 tablet (5 mg) by mouth 2 times daily       ARTIFICIAL TEARS OP      Apply 1 drop to eye as needed       * aspirin 81 MG tablet      Take 81 mg by mouth       * aspirin 81 MG EC tablet      Take 1 tablet by mouth daily.       BENEFIBER Powd      2 teaspoons daily       BREO ELLIPTA 100-25 MCG/INH oral inhaler   Generic drug:  fluticasone-vilanterol      Inhale 1 puff into the lungs daily Reported on 4/25/2017       * calcitRIOL 0.25 MCG capsule    ROCALTROL    180 capsule    Take 2 tablets daily       * calcitRIOL 0.5 MCG capsule    ROCALTROL    90 capsule    Take 1 capsule (0.5 mcg) by mouth daily       clotrimazole 1 % cream    LOTRIMIN     Apply topically 2 times daily Reported on 4/25/2017       folic acid 1 MG tablet    FOLVITE    90 tablet    Take 1 tablet (1 mg) by mouth daily       furosemide 20 MG tablet    LASIX    90 tablet    Take 1 tablet (20 mg) by mouth every other day       levothyroxine 150 MCG tablet    SYNTHROID/LEVOTHROID    96 tablet    Take one tablet daily 6 days a week and one and a half tablets one day a week.       magnesium 250 MG tablet      Take 2 tablets by mouth daily At night.       meclizine 25 MG tablet    ANTIVERT    30 tablet    Take 1 tablet (25 mg) by mouth as needed       metoprolol 25 MG 24 hr tablet    TOPROL-XL    30 tablet    Take 1 tablet (25 mg) by mouth daily       predniSONE 5 MG tablet    DELTASONE    90 tablet     Take 1 tablet (5 mg) by mouth daily       PRESERVISION AREDS 2 Caps      Take 2 capsules by mouth daily       sirolimus Tabs tablet     15 tablet    Take 0.5 mg by mouth every other day       STATIN NOT PRESCRIBED (INTENTIONAL)     0 each    Statin not prescribed intentionally due to Intolerance (with supporting documentation of trying a statin at least once within the last 5 years)       SUMAtriptan 50 MG tablet    IMITREX    30 tablet    Take 1 tablet (50 mg) by mouth at onset of headache for migraine repeat after 2 hours if needed.       * triamcinolone 0.1 % cream    KENALOG    80 g    Apply topically 2 times daily Apply to rash       * triamcinolone 0.1 % cream    KENALOG    80 g    Apply cream twice daily to foot       ursodiol 250 MG tablet    ACTIGALL    180 tablet    Take 1 tablet (250 mg) by mouth 2 times daily       voriconazole 200 MG tablet    VFEND    60 tablet    Take 1 tablet (200 mg) by mouth 2 times daily       * Notice:  This list has 6 medication(s) that are the same as other medications prescribed for you. Read the directions carefully, and ask your doctor or other care provider to review them with you.

## 2017-05-23 NOTE — NURSING NOTE
"Dermatology Rooming Note    Luz Thompson's goals for this visit include:   Chief Complaint   Patient presents with     Derm Problem     Virginia is here today for a 1 month follow up for a spot on her foot. virginia notes\" the spot is not 100% gone but its better\"     Celina Boss MA    "

## 2017-05-23 NOTE — LETTER
"5/23/2017       RE: Luz Thompson  [address]   [facility] [place] [address]     Dear Colleague,    Thank you for referring your patient, Luz Thompson, to the [facility] DERMATOLOGY at [facility]. Please see a copy of my visit note below.    [facility] Dermatology Note    Last Visit: 7/18/16    CC:   Chief Complaint   Patient presents with     Derm Problem     [place]ia is here today for a 1 month follow up for a spot on her foot. virginia notes\" the spot is not 100% gone but its better\"       Encounter Date: May 23, 2017    Dermatology Problem List  1. S/p liver transplant for PBC (2002)   - current: sirolimus, prednisone   2. Pruritic papular skin eruption-self-induced, resolved  3. Mild atopic dermatitis- hands, thumbs, abdomen, back   - previous: Amlactin, patient discontinued   4. Actinic keratoses- right temple, left cheek, s/p LN2  5. onychomycosis-  Oral voriconazole, prescribed by Dr. Montero (IM)   6. Drug hypersensitivity reaction- fluconazole  - residual lesions of abdomen and chest   7. Atrophe shanna    History of Present Illness  Luz Thompson is a 67 year old female with a [facility] liver transplant for PBC presenting today for a focused follow up of AK and atrophe shanna treated at her last visit one month ago. She feels scaly lesions treated with LN at last visit have improved --- they became red, peeled, and are now smooth. Her legs have improved with use of a stronger topical steroid. Color change is still noted. No other concerns today. Her drug rash has cleared as well..         Detailed Review of Systems  ROS as in HPI.  Otherwise nml state of health. No other skin concerns.    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     Diagnostic skin and sensitization tests NOT ALLERGIC TO CORN/MILK PER IGE TESTS    10/25/12 IgE tests for corn/milk NEGATIVE     Diverticulosis of sigmoid colon 12/21/2013     GERD (gastroesophageal reflux disease)      H/O esophageal varices      H/O liver transplant (H) 2002    due to primary biliary cirrhosis     Heart murmur 4/4/2012     Hyperlipidemia 4/10/2012    Says that she does not have it anymore, not on meds     Hypocalcemia      MEDIAL MENISCUS TEAR - right 8/2/2012     Migraines 4/4/2012     Osteoarthritis of right knee 8/2/2012     Osteoporosis 4/20/2012     Papillary thyroid carcinoma (H)      Post-surgical hypothyroidism      Primary biliary cirrhosis (H)     s/p Liver transplant, [id number]     Sjogren's syndrome (H)      Thyroid cancer (H)      Unspecified cerebral artery occlusion with cerebral infarction 2001    when BP was very low, small multiple infacts in frontal lobe, had \"visual field cut,\" leg weakness, and expressive aphasia - all have resolved.      Unspecified nonsenile cataract      Vitamin D deficiency 10/1/2012     VRE (vancomycin-resistant Enterococci)        Medications  Current Outpatient Prescriptions   Medication     ASPIRIN NOT PRESCRIBED (INTENTIONAL)     STATIN NOT PRESCRIBED, INTENTIONAL,     hydrALAZINE (APRESOLINE) 25 MG tablet     levothyroxine (SYNTHROID/LEVOTHROID) 150 MCG tablet     calcitRIOL (ROCALTROL) 0.5 MCG capsule     voriconazole (VFEND) 200 MG tablet     apixaban ANTICOAGULANT (ELIQUIS) 5 MG tablet     metoprolol (TOPROL-XL) 25 MG 24 hr tablet     predniSONE (DELTASONE) 5 MG tablet     RAPAMUNE 0.5 MG PO TABLET     fluticasone-vilanterol (BREO ELLIPTA) 100-25 MCG/INH oral inhaler     albuterol (ALBUTEROL) 108 (90 BASE) MCG/ACT Inhaler     clotrimazole (LOTRIMIN) 1 % cream     ursodiol (ACTIGALL) 250 MG tablet     Wheat Dextrin (BENEFIBER) POWD     SUMAtriptan (IMITREX) 50 MG tablet     meclizine (ANTIVERT) 25 MG tablet     " "folic acid (FOLVITE) 1 MG tablet     furosemide (LASIX) 20 MG tablet     STATIN NOT PRESCRIBED, INTENTIONAL,     Multiple Vitamins-Minerals (PRESERVISION AREDS 2) CAPS     triamcinolone (KENALOG) 0.1 % cream     Hypromellose (ARTIFICIAL TEARS OP)     acetaminophen 500 MG CAPS     magnesium 250 MG tablet     No current facility-administered medications for this visit.      Allergies  Allergies   Allergen Reactions     Fluconazole Hives and Itching     Azithromycin Itching     Benadryl [Diphenhydramine Hcl]      Insomnia      Cefpodoxime Itching     Cellcept Diarrhea     Ciprofloxacin Other (See Comments)     Insomnia, mood lability     Codeine      Psych disturbance     Diphenhydramine Other (See Comments)     Doxycycline      Lansoprazole Diarrhea     Levaquin [Levofloxacin] Other (See Comments)     Headache, hyperactivity     Lisinopril Cough     Methotrexate      Sores     Morphine Itching     Morphine Sulfate Itching     Mycophenolate Diarrhea     Pepcid Diarrhea     Prevacid Diarrhea     Ranitidine Diarrhea     Simvastatin Muscle Pain (Myalgia)     severe     Zantac Diarrhea     Cephalexin Rash     Fever and skin burning     Penicillin G Itching and Rash     Penicillins Rash     Tolectin [Nsaids] Rash     Tolmetin Rash     Tramadol Rash       Social History  Social History     Social History     Marital status:      Spouse name: Robbin Thompson     Number of children: 4     Years of education: 20     Occupational History     [employer]  [employer]     social work      Self     Social History Main Topics     Smoking status: Former Smoker     Packs/day: 1.00     Years: 18.00     Types: Cigarettes     Quit date: 4/12/1985     Smokeless tobacco: Never Used     Alcohol use 0.0 oz/week     0 Standard drinks or equivalent per week      Comment: rare - \"I toast at weddings\"     Drug use: No     Sexual activity: Yes     Partners: Male     Birth control/ protection: Post-menopausal "     Other Topics Concern     Exercise Yes     cardio and strengthing     Social History Narrative    She is retired. She and her  travel around the [place] in an RV. [place] usually [place] in the [place] the course of the winter. 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   Intermittent use of sun protection.       Family History  Family History   Problem Relation Age of Onset     Hypertension Mother      Lipids Mother      Prostate Cancer Father      Macular Degeneration Father      Cancer - colorectal Maternal Grandmother      in her 80's, has surgery and removal of part of kidney,  at age 98     Colon Cancer Maternal Grandmother      Eye Disorder Maternal Grandfather      Glaucoma     HEART DISEASE Maternal Grandfather       at 98     Glaucoma Maternal Grandfather      CEREBROVASCULAR DISEASE Paternal Grandmother      in her 80's     Hypertension Paternal Grandmother      HEART DISEASE Paternal Grandfather      MI     Allergies Son      Neurologic Disorder Daughter      Migraines     Anesthesia Reaction No family hx of      Crohn Disease No family hx of      Ulcerative Colitis No family hx of    maternal grandfather had skin cancer, but patient is not aware what kind       Physical Exam  There were no vitals taken for this visit.  GEN: NAD, alert and appropriately responsive, pleasant mood  SKIN:   -Focused skin, which includes the head/face, both arms, back, abdomen, both legs, digits and/or nails, was examined.  - Previously noted erythematous macules with overyling adherent scale on the face have cleared  - diffuse dermatitis has cleared from arms, hands, torso.  -scattered pink scaling plaques on the abdomen and back   -Ovoid pink patch on the dorsum of the right foot with atrophic macules within --- improved.  -previously noted 3mm erosion on right tragus has cleared  - no other lesions of concern were noted in areas  examined     Assessment & Plan:  1. Drug hypersensitivity eruption, likely related to fluconazole given temporal association. Clear today.    2. Actinic keratoses- face: lesions treated at last visit are clear.  -reviewed sun protection recommendations with patient and answered questions      3. Atrophe shanna - improved from last visit and less active today. Pulses palpable. OK to continue triamcinolone once daily for 1-2 more week, then stop. Moisturize daily to twice daily.     4. Scabbed lesion of R tragus-clear today. Felt was secondary to irritation from hearing aid at last visit. Monitor.        Follow-up in 5 months for TBSE, or sooner if concerns.       Staff only:  Mavis Bermudez MD, PhD    Department of Dermatology

## 2017-05-24 ENCOUNTER — OFFICE VISIT (OUTPATIENT)
Dept: INFECTIOUS DISEASES | Facility: CLINIC | Age: 67
End: 2017-05-24
Attending: INTERNAL MEDICINE
Payer: MEDICARE

## 2017-05-24 VITALS
BODY MASS INDEX: 28.69 KG/M2 | DIASTOLIC BLOOD PRESSURE: 65 MMHG | TEMPERATURE: 97.9 F | HEART RATE: 73 BPM | SYSTOLIC BLOOD PRESSURE: 178 MMHG | WEIGHT: 189.3 LBS | HEIGHT: 68 IN

## 2017-05-24 DIAGNOSIS — Z94.4 LIVER REPLACED BY TRANSPLANT (H): ICD-10-CM

## 2017-05-24 DIAGNOSIS — B27.00 EBV (EPSTEIN-BARR VIRUS) VIREMIA: ICD-10-CM

## 2017-05-24 DIAGNOSIS — B38.2 COCCIDIOIDAL PNEUMONITIS (H): Primary | ICD-10-CM

## 2017-05-24 DIAGNOSIS — Z22.39 VRE CARRIER: ICD-10-CM

## 2017-05-24 PROCEDURE — 99212 OFFICE O/P EST SF 10 MIN: CPT | Mod: ZF

## 2017-05-24 ASSESSMENT — PAIN SCALES - GENERAL: PAINLEVEL: NO PAIN (0)

## 2017-05-24 NOTE — PROGRESS NOTES
[facility]  Transplant Infectious Disease Clinic Note     Patient:  Luz Thompson, Date of birth 1950, Medical record number [id number]  Date of Visit:  05/24/2017         Assessment and Recommendations:   Recommendations:  - Continue voriconazole. Prior auth for vori will be placed as needed.   - Agree with pulmonary clinic plan to perform a follow-up chest CT scan on 7/31/2017.  - RTC after 7/31/2017 CT scan, so that we can decide how long to continue vori.   - Additionally, she will return to clinic in 6 months, just prior to her [place].    Assessment: Virginia is a 67 year old woman immunosuppressed s/p 2002 liver transplant with recurrent gram negative sepsis. She has known sigmoid diverticulosis. She gets episodes of defecation syncope.   ID issues:  - Cocci infection. Next repeat CT imaging due end of 7/2017. Continue voriconazole until that time. Her liver function tests are normal while on voriconazole. I do not think that we need to perform a blood level voriconazole, because clinically she is improving, so the associated available blood level is working for her.  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  - Self-triggered use of prn antibiotics due to recurrent episodes of sepsis. She gets a lot of itching with vantin, and quinolones and macrolides don't seem to work that well for her, so her current prn med is bactrim. If the bactrim does not do the job in controlling fever, because she has allergies to penicillins and quinolones (cipro and levaquin), we would ask that she be seen somewhere where she could be evaluated for a once daily infusion of ertapenem (also called Invanz).   - Hx of bacterial superinfection of chronic venous stasis changes on the legs, 7/27/2016.  - Hx of recurrent E coli sepsis 8/13/2013, 6/10/2015.   - Hx of Klebsiella UTI, 7/18/16. She completed a 14-day course of macrobid.  -  Hx of Haemophilus influenzae pneumonia in 9/2014.  - Hx of angular chelitis, hx of thrush extending from under her upper denture plate, and onychomycosis.   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   - PCP prophylaxis: Not needed, as most recent CD4 count was > 200.   - Serostatus: CMV seronegative, EBV seropositive on 8/15/13.  - Immunization status: Completed PCV13 and PPSV23 x2 with last dose in 5/2016.  - Gamma globulin status: Endogenously replete.  - Isolation status: Good hand hygience. When she is an inpatient, she needs to be in contact isolation for known VRE carriage.    Crystal Montero MD. 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 She has venous stasis changes of her legs, and she can get stasis dermatitis if she does not wear her compression stockings. This is especially problematic if the weather is very warm and for that reason she does not want to wear compression stockings. She received Rituxan therapy for EBV viremia on 8/31/2016. She had some side effects in the days following the dose of Rituxan, but otherwise would be able to tolerate it again in the future.  Since she was last seen in ID clinic on 9/21/2016, she has been diagnosed with coccidioidomycosis. She was traveling through the [facility] of the [place] in her [place] with her  over the winter, and she was diagnosed with Valley fever. There was a strong pulmonary component to the infection, and she is on voriconazole and is feeling better now. She can breath a lot better now, and she is able to use the treadmill at [employer] a little bit, over a mile the last time. Her cholesterol is too high, so that is part of the reason to try to exercise more. She is using fashionable compression socks that she got on line, and so she is not getting venous stasis fevers anymore. The  cardiologist changed her bp med due to afib episodes, needs to be adjusted since chol & bp both high.     Transplants:  5/22/2002 (Liver), Postoperative day:  5481         Review of Systems:   CONSTITUTIONAL:  No current fever, chills, but she has occasional night sweats.  EYES: negative for icterus. She had eye cataract surgery 12/2013. She has some macular degeneration being watched.  Eye exam in 5/2016 was stable, scheduled again for 7/2017.  ENT:  She wears hearing aids. She takes her dentures out at night.  RESPIRATORY:  Occ dry cough, no sputum, but sometimes she has dyspnea.   CARDIOVASCULAR:  negative for chest pain, heart palpitations  GASTROINTESTINAL:  negative for nausea, vomiting, diarrhea, constipation.  GENITOURINARY:  No dysuria  HEME:  Normal amount of easy bruising  INTEGUMENT:  Knock-kneed, trying to not wear dress shoes so that she doesn't hit the insides of her ankles. She has itching on her back, and the dermatologist evaluated this yesterday.   NEURO:  Occ headache     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     Diagnostic skin and sensitization tests NOT ALLERGIC TO CORN/MILK PER IGE TESTS    10/25/12 IgE tests for corn/milk NEGATIVE     Diverticulosis of sigmoid colon 12/21/2013     GERD (gastroesophageal reflux disease)      H/O esophageal varices      H/O liver transplant (H) 2002    due to primary biliary cirrhosis     Heart murmur 4/4/2012     Hyperlipidemia 4/10/2012    Says that she does not have it anymore, not on meds     Hypocalcemia      MEDIAL MENISCUS TEAR - right 8/2/2012     Migraines 4/4/2012     Osteoarthritis of right knee 8/2/2012     Osteoporosis 4/20/2012     Papillary thyroid carcinoma (H)      Post-surgical hypothyroidism      Primary biliary cirrhosis (H)   "   s/p Liver transplant, [id number]     Sjogren's syndrome (H)      Thyroid cancer (H)      Unspecified cerebral artery occlusion with cerebral infarction     when BP was very low, small multiple infacts in frontal lobe, had \"visual field cut,\" leg weakness, and expressive aphasia - all have resolved.      Unspecified nonsenile cataract      Vitamin D deficiency 10/1/2012     VRE (vancomycin-resistant Enterococci)        Past Surgical History:   Procedure Laterality Date     APPENDECTOMY       BIOPSY       CATARACT IOL, RT/LT      RE2013, LE12/10/2013 - Toric lenses     CHOLECYSTECTOMY       COLONOSCOPY       COLONOSCOPY  3/10/2014    Procedure: COLONOSCOPY;;  Surgeon: Gentry Ramirez MD;  Location: [facility] GI     ENDOSCOPIC RETROGRADE CHOLANGIOPANCREATOGRAM  2013    Procedure: ENDOSCOPIC RETROGRADE CHOLANGIOPANCREATOGRAM;  Endoscopic Retrograde Cholangiopancreatogram with single balloon enteroscopy, ballon sweep of bile duct;  Surgeon: Brett Membreno MD;  Location: [facility] OR     ENT SURGERY      ear drum repair     HC KNEE SCOPE,MED/LAT MENISECTOMY  8/10/12    Right, partial medial menisectomy only     KNEE SURGERY  1966    R knee     PICC INSERTION  2013    4fr SL PASV PICC, 40cm (1cm external) in the R basilic vein w/ tip in the low SVC     PICC INSERTION  2014    5 fr DL BioFlo Navilyst PICC, 46 cm (3 cm external) in the L basilic vein w/ tip in the SVC RA junction.     THYROIDECTOMY  3/2010     TRANSPLANT LIVER RECIPIENT LIVING UNRELATED         Family History   Problem Relation Age of Onset     Hypertension Mother      Lipids Mother      Prostate Cancer Father      Macular Degeneration Father      Cancer - colorectal Maternal Grandmother      in her 80's, has surgery and removal of part of kidney,  at age 98     Colon Cancer Maternal Grandmother      Eye Disorder Maternal Grandfather      Glaucoma     HEART DISEASE Maternal Grandfather       at 98     Glaucoma Maternal " "Grandfather      CEREBROVASCULAR DISEASE Paternal Grandmother      in her 80's     Hypertension Paternal Grandmother      HEART DISEASE Paternal Grandfather      MI     Allergies Son      Neurologic Disorder Daughter      Migraines     Anesthesia Reaction No family hx of      Crohn Disease No family hx of      Ulcerative Colitis No family hx of        Social History     Social History Narrative    She is retired. She and her  travel around the [place] in an [place]. [place] usually [place] over the course of the winter. 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     Social History   Substance Use Topics     Smoking status: Former Smoker     Packs/day: 1.00     Years: 18.00     Types: Cigarettes     Quit date: 4/12/1985     Smokeless tobacco: Never Used     Alcohol use 0.0 oz/week     0 Standard drinks or equivalent per week      Comment: rare - \"I toast at weddings\"       Immunization History   Administered Date(s) Administered     Influenza (High Dose) 3 valent vaccine 09/25/2015, 10/30/2016     Influenza (IIV3) 09/27/2012, 09/30/2013, 09/01/2016     Pneumococcal (PCV 13) 02/26/2014     Pneumococcal 23 valent 12/01/2007, 05/25/2016     TD (ADULT, 7+) 01/01/2007     TDAP Vaccine (Adacel) 02/26/2014       Patient Active Problem List   Diagnosis     Anemia of other chronic disease     Liver replaced by transplant (H)     Heart murmur     Primary biliary cirrhosis (H)     CKD (chronic kidney disease) stage 3, GFR 30-59 ml/min     GERD (gastroesophageal reflux disease)     Bladder infection, chronic     Osteoporosis     MEDIAL MENISCUS TEAR - right     Osteoarthritis of right knee     Postablative hypothyroidism     Hyperlipidemia with target LDL less than 100     History of thyroid cancer     Cerebral artery occlusion with cerebral infarction (H)     HDL deficiency     Advanced directives, counseling/discussion     Vitamin D " deficiency     Diagnostic skin and sensitization tests     Papillary carcinoma of thyroid (H)     Anemia, iron deficiency     S/P tympanoplasty     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     Hypertension goal BP (blood pressure) < 140/80     Status post cataract surgery     Urinary frequency     Diverticulosis of sigmoid colon     Aftercare following organ transplant     Prophylactic antibiotic     Syncope, vasovagal     Slow transit constipation     High risk medication use     Statin intolerance     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     Trochanteric bursitis of left hip     Klebsiella infection     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)     Coccidioidomycosis     Coccidioidal pneumonitis (H)     SNHL (sensorineural hearing loss)     Bilateral impacted cerumen       Outpatient Prescriptions Marked as Taking for the 5/24/17 encounter (Office Visit) with Crystal Montero MD   Medication Sig     levothyroxine (SYNTHROID/LEVOTHROID) 150 MCG tablet Take one tablet daily 6 days a week and one and a half tablets one day a week.     calcitRIOL (ROCALTROL) 0.5 MCG capsule Take 1 capsule (0.5 mcg) by mouth daily     voriconazole (VFEND) 200 MG tablet Take 1 tablet (200 mg) by mouth 2 times daily     apixaban ANTICOAGULANT (ELIQUIS) 5 MG tablet Take 1 tablet (5 mg) by mouth 2 times daily     metoprolol (TOPROL-XL) 25 MG 24 hr tablet Take 1 tablet (25 mg) by mouth daily     predniSONE (DELTASONE) 5 MG tablet Take 1 tablet (5 mg) by mouth daily     triamcinolone (KENALOG) 0.1 % cream Apply cream twice daily to foot     aspirin 81 MG tablet Take 81 mg by mouth     RAPAMUNE 0.5 MG PO TABLET Take 0.5 mg by mouth every other day     fluticasone-vilanterol (BREO ELLIPTA) 100-25 MCG/INH oral inhaler Inhale 1 puff into the lungs daily Reported on 4/25/2017     albuterol (ALBUTEROL) 108 (90 BASE) MCG/ACT Inhaler Inhale 2 puffs into the lungs every 4 hours as needed for shortness of breath / dyspnea or wheezing Reported on 4/25/2017      clotrimazole (LOTRIMIN) 1 % cream Apply topically 2 times daily Reported on 4/25/2017     ursodiol (ACTIGALL) 250 MG tablet Take 1 tablet (250 mg) by mouth 2 times daily     Wheat Dextrin (BENEFIBER) POWD 2 teaspoons daily     SUMAtriptan (IMITREX) 50 MG tablet Take 1 tablet (50 mg) by mouth at onset of headache for migraine repeat after 2 hours if needed.     meclizine (ANTIVERT) 25 MG tablet Take 1 tablet (25 mg) by mouth as needed     folic acid (FOLVITE) 1 MG tablet Take 1 tablet (1 mg) by mouth daily     furosemide (LASIX) 20 MG tablet Take 1 tablet (20 mg) by mouth every other day     STATIN NOT PRESCRIBED, INTENTIONAL, Statin not prescribed intentionally due to Intolerance (with supporting documentation of trying a statin at least once within the last 5 years)     Multiple Vitamins-Minerals (PRESERVISION AREDS 2) CAPS Take 2 capsules by mouth daily     calcitRIOL (ROCALTROL) 0.25 MCG capsule Take 2 tablets daily     triamcinolone (KENALOG) 0.1 % cream Apply topically 2 times daily Apply to rash     Hypromellose (ARTIFICIAL TEARS OP) Apply 1 drop to eye as needed     acetaminophen 500 MG CAPS Take 500 mg by mouth as needed Take 500-1,000 mg by mouth every 6 hours if needed.     magnesium 250 MG tablet Take 2 tablets by mouth daily At night.      aspirin 81 MG EC tablet Take 1 tablet by mouth daily.       Allergies   Allergen Reactions     Fluconazole Hives and Itching     Azithromycin Itching     Benadryl [Diphenhydramine Hcl]      Insomnia      Cefpodoxime Itching     Cellcept Diarrhea     Ciprofloxacin Other (See Comments)     Insomnia, mood lability     Codeine      Psych disturbance     Diphenhydramine Other (See Comments)     Doxycycline      Lansoprazole Diarrhea     Levaquin [Levofloxacin] Other (See Comments)     Headache, hyperactivity     Lisinopril Cough     Methotrexate      Sores     Morphine Itching     Morphine Sulfate Itching     Mycophenolate Diarrhea     Pepcid Diarrhea     Prevacid Diarrhea  "    Ranitidine Diarrhea     Zantac Diarrhea     Cephalexin Rash     Fever and skin burning     Penicillin G Itching and Rash     Penicillins Rash     Tolectin [Nsaids] Rash     Tolmetin Rash     Tramadol Rash            Physical Exam:   Vitals were reviewed.  All vitals stable.  /65  Pulse 73  Temp 97.9  F (36.6  C) (Oral)  Ht 1.715 m (5' 7.5\")  Wt 85.9 kg (189 lb 4.8 oz)  BMI 29.21 kg/m2   Wt Readings from Last 4 Encounters:   05/24/17 85.9 kg (189 lb 4.8 oz)   05/15/17 87.2 kg (192 lb 4.8 oz)   05/08/17 87.4 kg (192 lb 9.6 oz)   05/02/17 88.5 kg (195 lb)       Physical Examination:  GENERAL:  well-developed, well-nourished woman, in no acute distress.  HEENT:  Head is normocephalic, atraumatic   EYES:  Eyes have anicteric sclerae.   ENT: No hearing aids in place today and not removed for exam. Dentures in place, no thrush.  NECK:  Supple.   LUNGS: Clear to auscultation bilateral.   CARDIOVASCULAR: Regular rate and rhythm with 2/6 systolic murmur heard best at RUSB but no gallops or rubs.   ABDOMEN: Normal bowel sounds, not tender. Subcostal surgical scar not otherwise examined.  SKIN:  No acute rash. She is wearing compression stockings. Onychomycosis not examined today.   NEUROLOGIC:  Grossly nonfocal. Active x4 extremities         Laboratory Data:     Absolute CD4   Date Value Ref Range Status   08/19/2014 415 mm3 Final   09/16/2013 1266 mm3 Final   09/15/2013 DUPLICATE,TESTING DONE ON SPECIMEN FROM 9.16.13 mm3 Final   11/13/2008 1332 mm3 Final       Inflammatory Markers    Recent Labs   Lab Test  05/14/16   0659  05/13/16   0940  09/23/14   0810  08/19/14   1530  07/23/14   1119  06/30/14   0825  05/15/14   1112  05/08/14   0806  03/04/14   1315  02/27/14   1930   SED   --   67*  79*  76*   --   51*  58*  51*  55*  76*   CRP  21.0*  19.0*  6.1  29.8*  13.7*  12.5*  8.0  <5.0  5.5  16.4*       Immune Globulin Studies    Recent Labs   Lab Test  08/19/14   1530  05/21/12   0754   IGG  1220  1070   IGM   " --   97   IGE  46   --    IGA   --   85   IGG1  684   --    IGG2  441   --    IGG3  70   --    IGG4  19   --        Metabolic Studies       Recent Labs   Lab Test  05/10/17   0929  04/11/17   1003  08/26/16   0944  07/18/16   1430  05/31/16   0841  05/24/16   0846   05/13/16   0940   07/23/14   1119   NA  138  139  135  129*  132*  135   < >  132*   < >  137   POTASSIUM  4.2  3.9  4.1  3.8  4.0  3.6   < >  3.6   < >  3.8   CHLORIDE  102  102  100  94  99  99   < >  96   < >  98   CO2  26  26  27  28  25  27   < >  26   < >  27   ANIONGAP  10  11  8  7  8  9   < >  10   < >  12   BUN  36*  33*  29  28  38*  28   < >  29   < >  35*   CR  1.53*  1.38*  1.25*  1.24*  1.38*  1.77*   < >  1.64*   < >  1.70*   GFRESTIMATED  34*  38*  43*  43*  38*  29*   < >  31*   < >  30*   GLC  104*  115*  100*  146*  90  94   < >  98   < >  109*   KEILY  9.7  9.2  9.2  8.6  7.3*  8.8   < >  9.1   < >  9.1   PHOS  3.1   --    --    --    --   3.2   --    --    < >   --    MAG  2.8*  2.5*  2.2  1.9   --   2.3   --   2.2   < >   --    LACT   --    --    --    --    --    --    --   0.8   --   1.7    < > = values in this interval not displayed.       Hepatic Studies    Recent Labs   Lab Test  05/10/17   0929  04/11/17   1003  08/26/16   0944  07/18/16   1430  05/31/16   0841  05/24/16   0846   BILITOTAL  0.2  0.2  0.3  0.3  0.3  0.3   ALKPHOS  202*  210*  228*  204*  210*  241*   ALBUMIN  3.7  3.3*  2.9*  3.2*  2.9*  3.3*   AST  19  24  20  19  23  40   ALT  56*  50  88*  38  57*  115*       Gout Labs      Recent Labs   Lab Test  12/31/13   1443  07/30/13   1441   URIC  6.7  6.7       Hematology Studies      Recent Labs   Lab Test  05/10/17   0929  04/11/17   1003  08/26/16   0944  07/18/16   1430  05/24/16   0846  05/16/16   0827   05/14/16   0659  05/13/16   0940   08/19/14   1530   07/23/14   1119   02/18/14   0852   WBC  9.2  8.9  8.9  11.3*  7.2  5.7   < >  4.7  6.3   < >  12.5*   < >  7.2   < >  8.5   ANEU   --    --    --    --    --    2.5   --   2.1  4.1   --   9.9*   --   5.1   --   7.2   ALYM   --    --    --    --    --   2.4   --   1.6  1.0   --   1.0   --   0.9   --   0.6*   KATHY   --    --    --    --    --   0.6   --   0.9  1.1   --   1.0   --   1.1   --   0.6   AEOS   --    --    --    --    --   0.1   --   0.1  0.1   --   0.5   --   0.1   --   0.1   HGB  12.5  11.5*  11.5*  10.9*  11.2*  11.1*   < >  10.0*  11.4*   < >  10.0*   < >  10.1*   < >  11.6*   HCT  38.8  36.2  35.2  33.0*  33.9*  35.4   < >  31.8*  34.6*   < >  30.6*   < >  31.5*   < >  35.1   PLT  331  347  358  333  351  256   < >  230  244   < >  260   < >  238   < >  235    < > = values in this interval not displayed.       Clotting Studies    Recent Labs   Lab Test  05/16/16   0827  05/13/16   0940  11/06/13   1246  10/14/13   0904   06/19/11   1815   INR  1.02  1.11  0.96  0.98   < >  1.08   PTT   --   33   --    --    --   28    < > = values in this interval not displayed.       Thyroid Studies     Recent Labs   Lab Test  05/10/17   0929  07/18/16   1430  05/24/16   0846  07/20/15   1010  04/15/15   1059  09/23/14   0810   05/08/14   0806   TSH  4.74*  2.43  3.65  3.41  2.40  2.87   < >  4.41   T4  1.48*  1.38  1.19  1.24  1.62*  1.29  9.1   --   1.19  9.1   T3   --    --    --    --    --   65   --   48*    < > = values in this interval not displayed.       Urine Studies     Recent Labs   Lab Test  07/18/16   1503  05/13/16   1114  04/30/15   0951  09/22/14   1420  08/19/14   1410  07/23/14   1601  04/22/14   1530   URINEPH  5.5  6.5  6.0  5.5  7.0  6.5  6.0   NITRITE  Negative  Negative  Negative  Negative  Negative  Negative  Negative   LEUKEST  Large*  Moderate*  Negative  Negative  Negative  Small*  Small*   WBCU  10-25*  4*   --    --   <1  <1  O - 2       Medication levels    Recent Labs   Lab Test  05/10/17   0929   09/15/13   0435   12/21/12   1344   VANCOMYCIN   --    --   26.3   --    --    RAPAMY  5.8   < >   --    < >   --    MPACID   --    --    --    --    5.42*   MPAG   --    --    --    --   83.5    < > = values in this interval not displayed.       Microbiology:  Last Culture results with specimen source  Culture Micro   Date Value Ref Range Status   07/18/2016 (A)  Final    50,000 to 100,000 colonies/mL Klebsiella pneumoniae   05/21/2016 No growth  Final   05/21/2016 No growth  Final   05/16/2016 Canceled, Test credited Duplicate request  Final   05/16/2016 Canceled, Test credited Duplicate request  Final   05/16/2016 No growth  Final   05/16/2016 No growth  Final   05/13/2016 No growth after 29 days  Final   05/13/2016   Final    Canceled, Test credited Quantity not sufficient Notification of test cancellation   was given to [facility]W FROM [facility], HE WILL REORDER AND RECOLLECT     05/13/2016 No growth  Final   05/13/2016 No growth  Final   05/13/2016   Final    10,000 to 50,000 colonies/mL mixed urogenital louann  Susceptibility testing not routinely done     05/13/2016 No growth  Final   09/25/2014 (A)  Final    Normal louann  Moderate growth Haemophilus influenzae Beta lactamase negative     09/03/2014 (A)  Final    Normal louann  Heavy growth Haemophilus influenzae Beta lactamase negative  beta lactamase negative isolates are susceptible to ampicillin,   amoxacillin/clavulanic, levofloxacin and ceftriaxone     08/19/2014 No growth  Final   08/19/2014 No growth  Final   07/24/2014 No growth  Final   07/24/2014 No growth  Final    Specimen Description   Date Value Ref Range Status   07/18/2016 Midstream Urine  Final   05/21/2016 Blood Right Arm  Final   05/21/2016 Blood Left Arm  Final   05/16/2016 Blood  Final   05/16/2016 Blood  Final   05/16/2016 Blood Left Arm  Final   05/16/2016 Blood Right Arm  Final   05/13/2016 Blood Unspecified Site  Final   05/13/2016 Blood Unspecified Site  Final   05/13/2016 Blood Right Arm  Final   05/13/2016 Blood Right Arm  Final   05/13/2016 Midstream Urine  Final   05/13/2016 Nasal  Final   05/13/2016 Blood Venipuncture  Collection VPT  Final   09/25/2014 Sputum  Final   09/25/2014 Sputum  Final   09/03/2014 Sputum  Final   09/03/2014 Sputum  Final   08/19/2014 Blood Right Arm  Final        Last check of C difficile  C Diff Toxin B PCR   Date Value Ref Range Status   05/17/2012   Final    Negative: Clostridium difficile target DNA sequences NOT detected, presumed   negative for Clostridium difficile toxin B or the number of bacteria present   may be below the limit of detection for the test.   FDA approved assay performed using [employer] real-time PCR.   A negative result does not exclude actual disease due to Clostridium difficile   and may be due to improper collection, handling and storage of the specimen or   the number of organisms in the specimen is below the detection limit of the   assay.       Virology:  CMV Quantitative   Date Value Ref Range Status   08/19/2014 <100 <100 Copies/mL Final   08/15/2013 <100 <100 Copies/mL Final   11/11/2008 <100 <100 Copies/mL Final     Comment:     No CMV DNA detected.   08/21/2007 <100 <100 Copies/mL Final     Comment:     No CMV DNA detected.   01/10/2007 <100 <100 Copies/mL Final     Comment:     No CMV DNA detected.       EBV DNA Copies/mL   Date Value Ref Range Status   04/11/2017 (A) EBVNEG [Copies]/mL Final    <500  EBV DNA Detected below the reportable range of 500 Copies/mL     12/09/2016 1219 (A) EBVNEG [Copies]/mL Final   08/08/2016 25228 (A) EBVNEG [Copies]/mL Final   07/26/2016 41584 (A) EBVNEG [Copies]/mL Final   05/24/2016 56296 (A) EBVNEG [Copies]/mL Final   05/13/2016 86248 (A) EBVNEG [Copies]/mL Final   11/20/2015 57575 (A) EBVNEG [Copies]/mL Final   09/14/2015 46465 (A) EBVNEG [Copies]/mL Final   07/20/2015 66768 (A) EBVNEG [Copies]/mL Final   09/15/2013 <1000 <1000 Copies/mL Final   08/15/2013 <1000 <1000 Copies/mL Final   11/12/2008 <1000 <1000 Copies/mL Final       BK viral loads   Recent Labs   Lab Test  07/28/11   1150   BKSPEC  Urine   BKRES  <1000        Imaging:   EXAM:  CT CHEST W/O CONTRAST . 4/24/2017 2:12 PM   COMPARISON: CT 3/17/2005, outside CT 11/21/2014    Findings:  Postsurgical changes from liver transplant, pneumobilia likely from  prior intervention. Fatty replacement of the pancreas, unremarkable  rest of visualized upper abdomen. No suspicious bone lesion.    Areas of air trapping on expiratory phase images. No  lymphadenopathy/effusions.    Multiple calcified and noncalcified nodules, most predominant in right  lower lobe with associated groundglass opacities. Fibrotic changes in  the lingula and right middle lobe. Few scattered pulmonary nodules for  example 3 mm nodule in the superior segment of left lower lobe on  series 5 image 146. Lingular nodule with 8 mm average dimension on  series 5 image 165.      Impression    Impression:   1. Multiple calcified and noncalcified pulmonary nodules, the catheter  noted more predominant in the right lower lobe. Index 8 mm average  dimension nodules in the lingula, recommend low-dose CT chest  follow-up in 6 months.  2. Areas of air-trapping on expiratory phase imaging may be due to  reactive airway disease.

## 2017-05-24 NOTE — NURSING NOTE
"Chief Complaint   Patient presents with     RECHECK     follow up appt.       Initial /65  Pulse 73  Temp 97.9  F (36.6  C) (Oral)  Ht 1.715 m (5' 7.5\")  Wt 85.9 kg (189 lb 4.8 oz)  BMI 29.21 kg/m2 Estimated body mass index is 29.21 kg/(m^2) as calculated from the following:    Height as of this encounter: 1.715 m (5' 7.5\").    Weight as of this encounter: 85.9 kg (189 lb 4.8 oz).  Medication Reconciliation: complete  "

## 2017-05-24 NOTE — LETTER
5/24/2017     RE: Luz Thompson  [address]  [facility] [address]     Dear Colleague,    Thank you for referring your patient, Luz Thompson, to the [facility] AND INFECTIOUS DISEASES at [facility]. Please see a copy of my visit note below.    [facility]  Transplant Infectious Disease Clinic Note     Patient:  Luz Thompson, Date of birth 1950, Medical record number [id number]  Date of Visit:  05/24/2017         Assessment and Recommendations:   Recommendations:  - Continue voriconazole. Prior auth for vori will be placed as needed.   - Agree with pulmonary clinic plan to perform a follow-up chest CT scan on 7/31/2017.  - RTC after 7/31/2017 CT scan, so that we can decide how long to continue vori.   - Additionally, she will return to clinic in 6 months, just prior to her [place].    Assessment: Virginia is a 67 year old woman immunosuppressed s/p 2002 liver transplant with recurrent gram negative sepsis. She has known sigmoid diverticulosis. She gets episodes of defecation syncope.   ID issues:  - Cocci infection. Next repeat CT imaging due end of 7/2017. Continue voriconazole until that time. Her liver function tests are normal while on voriconazole. I do not think that we need to perform a blood level voriconazole, because clinically she is improving, so the associated available blood level is working for her.  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  - Self-triggered use of prn antibiotics due to recurrent episodes of sepsis. She gets a lot of itching with vantin, and quinolones and macrolides don't seem to work that well for her, so her current prn med is bactrim. If the bactrim does not do the job in controlling fever, because she has allergies to penicillins and quinolones (cipro and levaquin), we would ask that she be seen somewhere  where she could be evaluated for a once daily infusion of ertapenem (also called Invanz).   - Hx of bacterial superinfection of chronic venous stasis changes on the legs, 7/27/2016.  - Hx of recurrent E coli sepsis 8/13/2013, 6/10/2015.   - Hx of Klebsiella UTI, 7/18/16. She completed a 14-day course of macrobid.  - Hx of Haemophilus influenzae pneumonia in 9/2014.  - Hx of angular chelitis, hx of thrush extending from under her upper denture plate, and onychomycosis.   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   - PCP prophylaxis: Not needed, as most recent CD4 count was > 200.   - Serostatus: CMV seronegative, EBV seropositive on 8/15/13.  - Immunization status: Completed PCV13 and PPSV23 x2 with last dose in 5/2016.  - Gamma globulin status: Endogenously replete.  - Isolation status: Good hand hygience. When she is an inpatient, she needs to be in contact isolation for known VRE carriage.    Crystal Montero MD. 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 She has venous stasis changes of her legs, and she can get stasis dermatitis if she does not wear her compression stockings. This is especially problematic if the weather is very warm and for that reason she does not want to wear compression stockings. She received Rituxan therapy for EBV viremia on 8/31/2016. She had some side effects in the days following the dose of Rituxan, but otherwise would be able to tolerate it again in the future.  Since she was last seen in ID clinic on 9/21/2016, she has been diagnosed with coccidioidomycosis. She was traveling through the [facility] of the [place] in [place] with her  over the winter, and she was diagnosed with Valley fever. There was a strong pulmonary component to the infection, and she is on voriconazole and is feeling better now. She can breath a lot better now,  and she is able to use the treadmill at [employer] a little bit, over a mile the last time. Her cholesterol is too high, so that is part of the reason to try to exercise more. She is using fashionable compression socks that she got on line, and so she is not getting venous stasis fevers anymore. The cardiologist changed her bp med due to afib episodes, needs to be adjusted since chol & bp both high.     Transplants:  5/22/2002 (Liver), Postoperative day:  5481         Review of Systems:   CONSTITUTIONAL:  No current fever, chills, but she has occasional night sweats.  EYES: negative for icterus. She had eye cataract surgery 12/2013. She has some macular degeneration being watched.  Eye exam in 5/2016 was stable, scheduled again for 7/2017.  ENT:  She wears hearing aids. She takes her dentures out at night.  RESPIRATORY:  Occ dry cough, no sputum, but sometimes she has dyspnea.   CARDIOVASCULAR:  negative for chest pain, heart palpitations  GASTROINTESTINAL:  negative for nausea, vomiting, diarrhea, constipation.  GENITOURINARY:  No dysuria  HEME:  Normal amount of easy bruising  INTEGUMENT:  Knock-kneed, trying to not wear dress shoes so that she doesn't hit the insides of her ankles. She has itching on her back, and the dermatologist evaluated this yesterday.   NEURO:  Occ headache     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     Diagnostic skin and sensitization tests NOT ALLERGIC TO CORN/MILK PER IGE TESTS    10/25/12 IgE tests for corn/milk NEGATIVE     Diverticulosis of sigmoid colon 12/21/2013     GERD (gastroesophageal reflux disease)      H/O esophageal varices      H/O liver transplant (H) 2002    due to primary biliary cirrhosis     Heart murmur 4/4/2012     Hyperlipidemia 4/10/2012     "Says that she does not have it anymore, not on meds     Hypocalcemia      MEDIAL MENISCUS TEAR - right 8/2/2012     Migraines 4/4/2012     Osteoarthritis of right knee 8/2/2012     Osteoporosis 4/20/2012     Papillary thyroid carcinoma (H)      Post-surgical hypothyroidism      Primary biliary cirrhosis (H)     s/p Liver transplant, [id number]     Sjogren's syndrome (H)      Thyroid cancer (H)      Unspecified cerebral artery occlusion with cerebral infarction 2001    when BP was very low, small multiple infacts in frontal lobe, had \"visual field cut,\" leg weakness, and expressive aphasia - all have resolved.      Unspecified nonsenile cataract      Vitamin D deficiency 10/1/2012     VRE (vancomycin-resistant Enterococci)        Past Surgical History:   Procedure Laterality Date     APPENDECTOMY  1961     BIOPSY       CATARACT IOL, RT/LT      RE12/19/2013, LE12/10/2013 - Toric lenses     CHOLECYSTECTOMY  1991     COLONOSCOPY       COLONOSCOPY  3/10/2014    Procedure: COLONOSCOPY;;  Surgeon: Gentry Ramirez MD;  Location: [facility]     ENDOSCOPIC RETROGRADE CHOLANGIOPANCREATOGRAM  9/19/2013    Procedure: ENDOSCOPIC RETROGRADE CHOLANGIOPANCREATOGRAM;  Endoscopic Retrograde Cholangiopancreatogram with single balloon enteroscopy, ballon sweep of bile duct;  Surgeon: Brett Membreno MD;  Location: [facility]     ENT SURGERY      ear drum repair     HC KNEE SCOPE,MED/LAT MENISECTOMY  8/10/12    Right, partial medial menisectomy only     KNEE SURGERY  1966    R knee     PICC INSERTION  9/18/2013    4fr SL PASV PICC, 40cm (1cm external) in the R basilic vein w/ tip in the low SVC     PICC INSERTION  2/21/2014    5 fr DL BioFlo Navilyst PICC, 46 cm (3 cm external) in the L basilic vein w/ tip in the SVC RA junction.     THYROIDECTOMY  3/2010     TRANSPLANT LIVER RECIPIENT LIVING UNRELATED         Family History   Problem Relation Age of Onset     Hypertension Mother      Lipids Mother      Prostate Cancer Father      Macular " "Degeneration Father      Cancer - colorectal Maternal Grandmother      in her 80's, has surgery and removal of part of kidney,  at age 98     Colon Cancer Maternal Grandmother      Eye Disorder Maternal Grandfather      Glaucoma     HEART DISEASE Maternal Grandfather       at 98     Glaucoma Maternal Grandfather      CEREBROVASCULAR DISEASE Paternal Grandmother      in her 80's     Hypertension Paternal Grandmother      HEART DISEASE Paternal Grandfather      MI     Allergies Son      Neurologic Disorder Daughter      Migraines     Anesthesia Reaction No family hx of      Crohn Disease No family hx of      Ulcerative Colitis No family hx of        Social History     Social History Narrative    She is retired. She and her  travel around the [place] in an [place]. [place] over the course of the winter. 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     Social History   Substance Use Topics     Smoking status: Former Smoker     Packs/day: 1.00     Years: 18.00     Types: Cigarettes     Quit date: 1985     Smokeless tobacco: Never Used     Alcohol use 0.0 oz/week     0 Standard drinks or equivalent per week      Comment: rare - \"I toast at weddings\"       Immunization History   Administered Date(s) Administered     Influenza (High Dose) 3 valent vaccine 2015, 10/30/2016     Influenza (IIV3) 2012, 2013, 2016     Pneumococcal (PCV 13) 2014     Pneumococcal 23 valent 2007, 2016     TD (ADULT, 7+) 2007     TDAP Vaccine (Adacel) 2014       Patient Active Problem List   Diagnosis     Anemia of other chronic disease     Liver replaced by transplant (H)     Heart murmur     Primary biliary cirrhosis (H)     CKD (chronic kidney disease) stage 3, GFR 30-59 ml/min     GERD (gastroesophageal reflux disease)     Bladder infection, chronic     Osteoporosis     MEDIAL " MENISCUS TEAR - right     Osteoarthritis of right knee     Postablative hypothyroidism     Hyperlipidemia with target LDL less than 100     History of thyroid cancer     Cerebral artery occlusion with cerebral infarction (H)     HDL deficiency     Advanced directives, counseling/discussion     Vitamin D deficiency     Diagnostic skin and sensitization tests     Papillary carcinoma of thyroid (H)     Anemia, iron deficiency     S/P tympanoplasty     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     Hypertension goal BP (blood pressure) < 140/80     Status post cataract surgery     Urinary frequency     Diverticulosis of sigmoid colon     Aftercare following organ transplant     Prophylactic antibiotic     Syncope, vasovagal     Slow transit constipation     High risk medication use     Statin intolerance     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     Trochanteric bursitis of left hip     Klebsiella infection     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)     Coccidioidomycosis     Coccidioidal pneumonitis (H)     SNHL (sensorineural hearing loss)     Bilateral impacted cerumen       Outpatient Prescriptions Marked as Taking for the 5/24/17 encounter (Office Visit) with Crystal Mnotero MD   Medication Sig     levothyroxine (SYNTHROID/LEVOTHROID) 150 MCG tablet Take one tablet daily 6 days a week and one and a half tablets one day a week.     calcitRIOL (ROCALTROL) 0.5 MCG capsule Take 1 capsule (0.5 mcg) by mouth daily     voriconazole (VFEND) 200 MG tablet Take 1 tablet (200 mg) by mouth 2 times daily     apixaban ANTICOAGULANT (ELIQUIS) 5 MG tablet Take 1 tablet (5 mg) by mouth 2 times daily     metoprolol (TOPROL-XL) 25 MG 24 hr tablet Take 1 tablet (25 mg) by mouth daily     predniSONE (DELTASONE) 5 MG tablet Take 1 tablet (5 mg) by mouth daily     triamcinolone (KENALOG) 0.1 % cream Apply cream twice daily to foot     aspirin 81 MG tablet Take 81 mg by mouth     RAPAMUNE 0.5 MG PO TABLET Take 0.5 mg by mouth every other day      fluticasone-vilanterol (BREO ELLIPTA) 100-25 MCG/INH oral inhaler Inhale 1 puff into the lungs daily Reported on 4/25/2017     albuterol (ALBUTEROL) 108 (90 BASE) MCG/ACT Inhaler Inhale 2 puffs into the lungs every 4 hours as needed for shortness of breath / dyspnea or wheezing Reported on 4/25/2017     clotrimazole (LOTRIMIN) 1 % cream Apply topically 2 times daily Reported on 4/25/2017     ursodiol (ACTIGALL) 250 MG tablet Take 1 tablet (250 mg) by mouth 2 times daily     Wheat Dextrin (BENEFIBER) POWD 2 teaspoons daily     SUMAtriptan (IMITREX) 50 MG tablet Take 1 tablet (50 mg) by mouth at onset of headache for migraine repeat after 2 hours if needed.     meclizine (ANTIVERT) 25 MG tablet Take 1 tablet (25 mg) by mouth as needed     folic acid (FOLVITE) 1 MG tablet Take 1 tablet (1 mg) by mouth daily     furosemide (LASIX) 20 MG tablet Take 1 tablet (20 mg) by mouth every other day     STATIN NOT PRESCRIBED, INTENTIONAL, Statin not prescribed intentionally due to Intolerance (with supporting documentation of trying a statin at least once within the last 5 years)     Multiple Vitamins-Minerals (PRESERVISION AREDS 2) CAPS Take 2 capsules by mouth daily     calcitRIOL (ROCALTROL) 0.25 MCG capsule Take 2 tablets daily     triamcinolone (KENALOG) 0.1 % cream Apply topically 2 times daily Apply to rash     Hypromellose (ARTIFICIAL TEARS OP) Apply 1 drop to eye as needed     acetaminophen 500 MG CAPS Take 500 mg by mouth as needed Take 500-1,000 mg by mouth every 6 hours if needed.     magnesium 250 MG tablet Take 2 tablets by mouth daily At night.      aspirin 81 MG EC tablet Take 1 tablet by mouth daily.       Allergies   Allergen Reactions     Fluconazole Hives and Itching     Azithromycin Itching     Benadryl [Diphenhydramine Hcl]      Insomnia      Cefpodoxime Itching     Cellcept Diarrhea     Ciprofloxacin Other (See Comments)     Insomnia, mood lability     Codeine      Psych disturbance     Diphenhydramine  "Other (See Comments)     Doxycycline      Lansoprazole Diarrhea     Levaquin [Levofloxacin] Other (See Comments)     Headache, hyperactivity     Lisinopril Cough     Methotrexate      Sores     Morphine Itching     Morphine Sulfate Itching     Mycophenolate Diarrhea     Pepcid Diarrhea     Prevacid Diarrhea     Ranitidine Diarrhea     Zantac Diarrhea     Cephalexin Rash     Fever and skin burning     Penicillin G Itching and Rash     Penicillins Rash     Tolectin [Nsaids] Rash     Tolmetin Rash     Tramadol Rash            Physical Exam:   Vitals were reviewed.  All vitals stable.  /65  Pulse 73  Temp 97.9  F (36.6  C) (Oral)  Ht 1.715 m (5' 7.5\")  Wt 85.9 kg (189 lb 4.8 oz)  BMI 29.21 kg/m2   Wt Readings from Last 4 Encounters:   05/24/17 85.9 kg (189 lb 4.8 oz)   05/15/17 87.2 kg (192 lb 4.8 oz)   05/08/17 87.4 kg (192 lb 9.6 oz)   05/02/17 88.5 kg (195 lb)       Physical Examination:  GENERAL:  well-developed, well-nourished woman, in no acute distress.  HEENT:  Head is normocephalic, atraumatic   EYES:  Eyes have anicteric sclerae.   ENT: No hearing aids in place today and not removed for exam. Dentures in place, no thrush.  NECK:  Supple.   LUNGS: Clear to auscultation bilateral.   CARDIOVASCULAR: Regular rate and rhythm with 2/6 systolic murmur heard best at RUSB but no gallops or rubs.   ABDOMEN: Normal bowel sounds, not tender. Subcostal surgical scar not otherwise examined.  SKIN:  No acute rash. She is wearing compression stockings. Onychomycosis not examined today.   NEUROLOGIC:  Grossly nonfocal. Active x4 extremities         Laboratory Data:     Absolute CD4   Date Value Ref Range Status   08/19/2014 415 mm3 Final   09/16/2013 1266 mm3 Final   09/15/2013 DUPLICATE,TESTING DONE ON SPECIMEN FROM 9.16.13 mm3 Final   11/13/2008 1332 mm3 Final       Inflammatory Markers    Recent Labs   Lab Test  05/14/16   0659  05/13/16   0940  09/23/14   0810  08/19/14   1530  07/23/14   1119  06/30/14   0825  " 05/15/14   1112  05/08/14   0806  03/04/14   1315  02/27/14   1930   SED   --   67*  79*  76*   --   51*  58*  51*  55*  76*   CRP  21.0*  19.0*  6.1  29.8*  13.7*  12.5*  8.0  <5.0  5.5  16.4*       Immune Globulin Studies    Recent Labs   Lab Test  08/19/14   1530  05/21/12   0754   IGG  1220  1070   IGM   --   97   IGE  46   --    IGA   --   85   IGG1  684   --    IGG2  441   --    IGG3  70   --    IGG4  19   --        Metabolic Studies       Recent Labs   Lab Test  05/10/17   0929  04/11/17   1003  08/26/16   0944  07/18/16   1430  05/31/16   0841  05/24/16   0846   05/13/16   0940   07/23/14   1119   NA  138  139  135  129*  132*  135   < >  132*   < >  137   POTASSIUM  4.2  3.9  4.1  3.8  4.0  3.6   < >  3.6   < >  3.8   CHLORIDE  102  102  100  94  99  99   < >  96   < >  98   CO2  26  26  27  28  25  27   < >  26   < >  27   ANIONGAP  10  11  8  7  8  9   < >  10   < >  12   BUN  36*  33*  29  28  38*  28   < >  29   < >  35*   CR  1.53*  1.38*  1.25*  1.24*  1.38*  1.77*   < >  1.64*   < >  1.70*   GFRESTIMATED  34*  38*  43*  43*  38*  29*   < >  31*   < >  30*   GLC  104*  115*  100*  146*  90  94   < >  98   < >  109*   KEILY  9.7  9.2  9.2  8.6  7.3*  8.8   < >  9.1   < >  9.1   PHOS  3.1   --    --    --    --   3.2   --    --    < >   --    MAG  2.8*  2.5*  2.2  1.9   --   2.3   --   2.2   < >   --    LACT   --    --    --    --    --    --    --   0.8   --   1.7    < > = values in this interval not displayed.       Hepatic Studies    Recent Labs   Lab Test  05/10/17   0929  04/11/17   1003  08/26/16   0944  07/18/16   1430  05/31/16   0841  05/24/16   0846   BILITOTAL  0.2  0.2  0.3  0.3  0.3  0.3   ALKPHOS  202*  210*  228*  204*  210*  241*   ALBUMIN  3.7  3.3*  2.9*  3.2*  2.9*  3.3*   AST  19  24  20  19  23  40   ALT  56*  50  88*  38  57*  115*       Gout Labs      Recent Labs   Lab Test  12/31/13   1443  07/30/13   1441   URIC  6.7  6.7       Hematology Studies      Recent Labs   Lab Test   05/10/17   0929  04/11/17   1003  08/26/16   0944  07/18/16   1430  05/24/16   0846  05/16/16   0827   05/14/16   0659  05/13/16   0940   08/19/14   1530   07/23/14   1119   02/18/14   0852   WBC  9.2  8.9  8.9  11.3*  7.2  5.7   < >  4.7  6.3   < >  12.5*   < >  7.2   < >  8.5   ANEU   --    --    --    --    --   2.5   --   2.1  4.1   --   9.9*   --   5.1   --   7.2   ALYM   --    --    --    --    --   2.4   --   1.6  1.0   --   1.0   --   0.9   --   0.6*   KATHY   --    --    --    --    --   0.6   --   0.9  1.1   --   1.0   --   1.1   --   0.6   AEOS   --    --    --    --    --   0.1   --   0.1  0.1   --   0.5   --   0.1   --   0.1   HGB  12.5  11.5*  11.5*  10.9*  11.2*  11.1*   < >  10.0*  11.4*   < >  10.0*   < >  10.1*   < >  11.6*   HCT  38.8  36.2  35.2  33.0*  33.9*  35.4   < >  31.8*  34.6*   < >  30.6*   < >  31.5*   < >  35.1   PLT  331  347  358  333  351  256   < >  230  244   < >  260   < >  238   < >  235    < > = values in this interval not displayed.       Clotting Studies    Recent Labs   Lab Test  05/16/16   0827  05/13/16   0940  11/06/13   1246  10/14/13   0904   06/19/11   1815   INR  1.02  1.11  0.96  0.98   < >  1.08   PTT   --   33   --    --    --   28    < > = values in this interval not displayed.       Thyroid Studies     Recent Labs   Lab Test  05/10/17   0929  07/18/16   1430  05/24/16   0846  07/20/15   1010  04/15/15   1059  09/23/14   0810   05/08/14   0806   TSH  4.74*  2.43  3.65  3.41  2.40  2.87   < >  4.41   T4  1.48*  1.38  1.19  1.24  1.62*  1.29  9.1   --   1.19  9.1   T3   --    --    --    --    --   65   --   48*    < > = values in this interval not displayed.       Urine Studies     Recent Labs   Lab Test  07/18/16   1503  05/13/16   1114  04/30/15   0951  09/22/14   1420  08/19/14   1410  07/23/14   1601  04/22/14   1530   URINEPH  5.5  6.5  6.0  5.5  7.0  6.5  6.0   NITRITE  Negative  Negative  Negative  Negative  Negative  Negative  Negative   LEUKEST  Large*   Moderate*  Negative  Negative  Negative  Small*  Small*   WBCU  10-25*  4*   --    --   <1  <1  O - 2       Medication levels    Recent Labs   Lab Test  05/10/17   0929   09/15/13   0435   12/21/12   1344   VANCOMYCIN   --    --   26.3   --    --    RAPAMY  5.8   < >   --    < >   --    MPACID   --    --    --    --   5.42*   MPAG   --    --    --    --   83.5    < > = values in this interval not displayed.       Microbiology:  Last Culture results with specimen source  Culture Micro   Date Value Ref Range Status   07/18/2016 (A)  Final    50,000 to 100,000 colonies/mL Klebsiella pneumoniae   05/21/2016 No growth  Final   05/21/2016 No growth  Final   05/16/2016 Canceled, Test credited Duplicate request  Final   05/16/2016 Canceled, Test credited Duplicate request  Final   05/16/2016 No growth  Final   05/16/2016 No growth  Final   05/13/2016 No growth after 29 days  Final   05/13/2016   Final    Canceled, Test credited Quantity not sufficient Notification of test cancellation   was given to MATTHEW FROM [facility], HE WILL REORDER AND RECOLLECT     05/13/2016 No growth  Final   05/13/2016 No growth  Final   05/13/2016   Final    10,000 to 50,000 colonies/mL mixed urogenital louann  Susceptibility testing not routinely done     05/13/2016 No growth  Final   09/25/2014 (A)  Final    Normal louann  Moderate growth Haemophilus influenzae Beta lactamase negative     09/03/2014 (A)  Final    Normal louann  Heavy growth Haemophilus influenzae Beta lactamase negative  beta lactamase negative isolates are susceptible to ampicillin,   amoxacillin/clavulanic, levofloxacin and ceftriaxone     08/19/2014 No growth  Final   08/19/2014 No growth  Final   07/24/2014 No growth  Final   07/24/2014 No growth  Final    Specimen Description   Date Value Ref Range Status   07/18/2016 Midstream Urine  Final   05/21/2016 Blood Right Arm  Final   05/21/2016 Blood Left Arm  Final   05/16/2016 Blood  Final   05/16/2016 Blood  Final   05/16/2016  Blood Left Arm  Final   05/16/2016 Blood Right Arm  Final   05/13/2016 Blood Unspecified Site  Final   05/13/2016 Blood Unspecified Site  Final   05/13/2016 Blood Right Arm  Final   05/13/2016 Blood Right Arm  Final   05/13/2016 Midstream Urine  Final   05/13/2016 Nasal  Final   05/13/2016 Blood Venipuncture Collection VPT  Final   09/25/2014 Sputum  Final   09/25/2014 Sputum  Final   09/03/2014 Sputum  Final   09/03/2014 Sputum  Final   08/19/2014 Blood Right Arm  Final        Last check of C difficile  C Diff Toxin B PCR   Date Value Ref Range Status   05/17/2012   Final    Negative: Clostridium difficile target DNA sequences NOT detected, presumed   negative for Clostridium difficile toxin B or the number of bacteria present   may be below the limit of detection for the test.   FDA approved assay performed using [employer] real-time PCR.   A negative result does not exclude actual disease due to Clostridium difficile   and may be due to improper collection, handling and storage of the specimen or   the number of organisms in the specimen is below the detection limit of the   assay.       Virology:  CMV Quantitative   Date Value Ref Range Status   08/19/2014 <100 <100 Copies/mL Final   08/15/2013 <100 <100 Copies/mL Final   11/11/2008 <100 <100 Copies/mL Final     Comment:     No CMV DNA detected.   08/21/2007 <100 <100 Copies/mL Final     Comment:     No CMV DNA detected.   01/10/2007 <100 <100 Copies/mL Final     Comment:     No CMV DNA detected.       EBV DNA Copies/mL   Date Value Ref Range Status   04/11/2017 (A) EBVNEG [Copies]/mL Final    <500  EBV DNA Detected below the reportable range of 500 Copies/mL     12/09/2016 1219 (A) EBVNEG [Copies]/mL Final   08/08/2016 32985 (A) EBVNEG [Copies]/mL Final   07/26/2016 95843 (A) EBVNEG [Copies]/mL Final   05/24/2016 58142 (A) EBVNEG [Copies]/mL Final   05/13/2016 35199 (A) EBVNEG [Copies]/mL Final   11/20/2015 99619 (A) EBVNEG [Copies]/mL Final   09/14/2015  54841 (A) EBVNEG [Copies]/mL Final   07/20/2015 16583 (A) EBVNEG [Copies]/mL Final   09/15/2013 <1000 <1000 Copies/mL Final   08/15/2013 <1000 <1000 Copies/mL Final   11/12/2008 <1000 <1000 Copies/mL Final       BK viral loads   Recent Labs   Lab Test  07/28/11   1150   BKSPEC  Urine   BKRES  <1000       Imaging:   EXAM:  CT CHEST W/O CONTRAST . 4/24/2017 2:12 PM   COMPARISON: CT 3/17/2005, outside CT 11/21/2014    Findings:  Postsurgical changes from liver transplant, pneumobilia likely from  prior intervention. Fatty replacement of the pancreas, unremarkable  rest of visualized upper abdomen. No suspicious bone lesion.    Areas of air trapping on expiratory phase images. No  lymphadenopathy/effusions.    Multiple calcified and noncalcified nodules, most predominant in right  lower lobe with associated groundglass opacities. Fibrotic changes in  the lingula and right middle lobe. Few scattered pulmonary nodules for  example 3 mm nodule in the superior segment of left lower lobe on  series 5 image 146. Lingular nodule with 8 mm average dimension on  series 5 image 165.      Impression    Impression:   1. Multiple calcified and noncalcified pulmonary nodules, the catheter  noted more predominant in the right lower lobe. Index 8 mm average  dimension nodules in the lingula, recommend low-dose CT chest  follow-up in 6 months.  2. Areas of air-trapping on expiratory phase imaging may be due to  reactive airway disease.     Again, thank you for allowing me to participate in the care of your patient.      Sincerely,    Crystal Montero MD

## 2017-05-24 NOTE — MR AVS SNAPSHOT
After Visit Summary   5/24/2017    Luz Thompson    MRN: [id number]           Patient Information     Date Of Birth          1950        Visit Information        Provider Department      5/24/2017 10:00 AM Crystal Montero MD [facility] and Infectious Diseases         Follow-ups after your visit        Your next 10 appointments already scheduled     Jul 12, 2017  9:30 AM CDT   RETURN RETINA with Meri Frost MD   [facility] ([facility])    [address]  [address] [place] [address]   [phone]            Jul 12, 2017 10:30 AM CDT   RETURN GENERAL with Dora Tovar MD   [facility] ([facility])    [address]  [address] [address]   [phone]            Jul 12, 2017  1:00 PM CDT   (Arrive by 12:45 PM)   New Patient Visit with Randell Reyna MD   [facility] ([facility] [place])    [address]  3rd [facility] [address] [phone]            Jul 31, 2017  9:20 AM CDT   (Arrive by 9:05 AM)   CT CHEST W/O CONTRAST with UCCT1   [facility] [place] [facility] ([facility] [place])    [address]  [address]st [facility] [address]   [phone]           Please bring any scans or X-rays taken at other hospitals, if similar tests were done. Also bring a list of your medicines, including vitamins, minerals and over-the-counter drugs. It is safest to leave personal items at home.  Be sure to tell your doctor:   If you have any allergies.   If there s any chance you are pregnant.   If you are breastfeeding.   If you have any special needs.  You do not need to do anything special to prepare.  Please wear loose clothing, such as a sweat suit or jogging clothes. Avoid snaps, zippers and other metal. We may ask you to undress and put on a hospital gown.            Jul 31, 2017 10:30 AM CDT    (Arrive by 10:15 AM)   Return Visit with Eden Clinton MD   [facility] ([facility])    [address] [place] [address]  3rd [facility] [address] [phone]            Jul 31, 2017  1:00 PM CDT   (Arrive by 12:45 PM)   PAC Pharmacist with  Pac Pharmacist   [facility] [place] ([facility])    [address] [place] [address]  4th [facility] [address]   [phone]            Jul 31, 2017  1:30 PM CDT   (Arrive by 1:15 PM)   PAC EVALUATION with  Pac Deidre 3   [facility] [place] ([facility])    [address] [place] [address]   [phone]            Jul 31, 2017  2:30 PM CDT   (Arrive by 2:15 PM)   PAC RN ASSESSMENT with  Pac Rn   [facility] [place] ([facility])    [address] [place] [address]   [phone]            Jul 31, 2017  3:10 PM CDT   (Arrive by 2:55 PM)   PAC Anesthesia Consult with  Pac Anesthesiologist   [facility] [place] ([facility])    [address] [place] [id number] [phone]            Aug 14, 2017  1:00 PM CDT   (Arrive by 12:45 PM)   ATRIAL FIBULATION VISIT with Graham Gilmore MD   [facility] ([facility])    [address] [place] [address] [phone]              Who to contact     If you have questions or need follow up information about today's clinic visit or your schedule please contact [facility] AND INFECTIOUS DISEASES directly at [phone].  Normal or non-critical lab and imaging results will be communicated to you by MyChart, letter or phone within 4 business days after the clinic has received the results. If you do not hear from us within 7 days, please  "contact the clinic through [employer] or phone. If you have a critical or abnormal lab result, we will notify you by phone as soon as possible.  Submit refill requests through [employer] or call your pharmacy and they will forward the refill request to us. Please allow 3 business days for your refill to be completed.          Additional Information About Your Visit        [employer] Information     [employer] gives you secure access to your electronic health record. If you see a primary care provider, you can also send messages to your care team and make appointments. If you have questions, please call your primary care clinic.  If you do not have a primary care provider, please call [phone] and they will assist you.        Care EveryWhere ID     This is your Care EveryWhere ID. This could be used by other organizations to access your [place] medical records  [id number]        Your Vitals Were     Pulse Temperature Height BMI (Body Mass Index)          73 97.9  F (36.6  C) (Oral) 1.715 m (5' 7.5\") 29.21 kg/m2         Blood Pressure from Last 3 Encounters:   05/24/17 178/65   05/15/17 146/77   05/08/17 148/80    Weight from Last 3 Encounters:   05/24/17 85.9 kg (189 lb 4.8 oz)   05/15/17 87.2 kg (192 lb 4.8 oz)   05/08/17 87.4 kg (192 lb 9.6 oz)              Today, you had the following     No orders found for display       Primary Care Provider Office Phone # Fax #    Jennifer Amparo Barraza [phone]       [facility] [address] [facility] [address]        Thank you!     Thank you for choosing [facility] AND INFECTIOUS DISEASES  for your care. Our goal is always to provide you with excellent care. Hearing back from our patients is one way we can continue to improve our services. Please take a few minutes to complete the written survey that you may receive in the mail after your visit with us. Thank you!             Your Updated Medication List - Protect others around " you: Learn how to safely use, store and throw away your medicines at www.disposemymeds.org.          This list is accurate as of: 5/24/17 10:43 AM.  Always use your most recent med list.                   Brand Name Dispense Instructions for use    acetaminophen 500 MG Caps      Take 500 mg by mouth as needed Take 500-1,000 mg by mouth every 6 hours if needed.       albuterol 108 (90 BASE) MCG/ACT Inhaler   Generic drug:  albuterol      Inhale 2 puffs into the lungs every 4 hours as needed for shortness of breath / dyspnea or wheezing Reported on 4/25/2017       apixaban ANTICOAGULANT 5 MG tablet    ELIQUIS    60 tablet    Take 1 tablet (5 mg) by mouth 2 times daily       ARTIFICIAL TEARS OP      Apply 1 drop to eye as needed       * aspirin 81 MG tablet      Take 81 mg by mouth       * aspirin 81 MG EC tablet      Take 1 tablet by mouth daily.       BENEFIBER Powd      2 teaspoons daily       BREO ELLIPTA 100-25 MCG/INH oral inhaler   Generic drug:  fluticasone-vilanterol      Inhale 1 puff into the lungs daily Reported on 4/25/2017       * calcitRIOL 0.25 MCG capsule    ROCALTROL    180 capsule    Take 2 tablets daily       * calcitRIOL 0.5 MCG capsule    ROCALTROL    90 capsule    Take 1 capsule (0.5 mcg) by mouth daily       clotrimazole 1 % cream    LOTRIMIN     Apply topically 2 times daily Reported on 4/25/2017       folic acid 1 MG tablet    FOLVITE    90 tablet    Take 1 tablet (1 mg) by mouth daily       furosemide 20 MG tablet    LASIX    90 tablet    Take 1 tablet (20 mg) by mouth every other day       levothyroxine 150 MCG tablet    SYNTHROID/LEVOTHROID    96 tablet    Take one tablet daily 6 days a week and one and a half tablets one day a week.       magnesium 250 MG tablet      Take 2 tablets by mouth daily At night.       meclizine 25 MG tablet    ANTIVERT    30 tablet    Take 1 tablet (25 mg) by mouth as needed       metoprolol 25 MG 24 hr tablet    TOPROL-XL    30 tablet    Take 1 tablet (25 mg)  by mouth daily       predniSONE 5 MG tablet    DELTASONE    90 tablet    Take 1 tablet (5 mg) by mouth daily       PRESERVISION AREDS 2 Caps      Take 2 capsules by mouth daily       sirolimus Tabs tablet     15 tablet    Take 0.5 mg by mouth every other day       STATIN NOT PRESCRIBED (INTENTIONAL)     0 each    Statin not prescribed intentionally due to Intolerance (with supporting documentation of trying a statin at least once within the last 5 years)       SUMAtriptan 50 MG tablet    IMITREX    30 tablet    Take 1 tablet (50 mg) by mouth at onset of headache for migraine repeat after 2 hours if needed.       * triamcinolone 0.1 % cream    KENALOG    80 g    Apply topically 2 times daily Apply to rash       * triamcinolone 0.1 % cream    KENALOG    80 g    Apply cream twice daily to foot       ursodiol 250 MG tablet    ACTIGALL    180 tablet    Take 1 tablet (250 mg) by mouth 2 times daily       voriconazole 200 MG tablet    VFEND    60 tablet    Take 1 tablet (200 mg) by mouth 2 times daily       * Notice:  This list has 6 medication(s) that are the same as other medications prescribed for you. Read the directions carefully, and ask your doctor or other care provider to review them with you.

## 2017-06-05 ENCOUNTER — CARE COORDINATION (OUTPATIENT)
Dept: CARDIOLOGY | Facility: CLINIC | Age: 67
End: 2017-06-05

## 2017-06-05 DIAGNOSIS — I10 HTN (HYPERTENSION): Primary | ICD-10-CM

## 2017-06-06 RX ORDER — HYDRALAZINE HYDROCHLORIDE 25 MG/1
12.5 TABLET, FILM COATED ORAL 3 TIMES DAILY PRN
Qty: 270 TABLET | Refills: 3 | Status: SHIPPED | OUTPATIENT
Start: 2017-06-06 | End: 2019-08-07

## 2017-06-06 NOTE — PROGRESS NOTES
Date: 6/6/2017    Time of Call: 10:25 AM     Diagnosis:  HTN     [ TORB ] Ordering provider: Dr. Reyna  Order: Hydralazine 12.5 mg by mouth up to three times daily as needed for SBP > 140 mmHg.       Order received by: Kaila Thompson, RN, BSN     Follow-up/additional notes: Discussed above plan with patient. She is going out of town this weekend and will not be able to check her blood pressure regularly.  She will report her blood pressures via Get[employer]out in 10-14 days, sooner if her BP is consistently above 140 mmHg.  Denies questions or concerns.

## 2017-06-06 NOTE — PROGRESS NOTES
Patient reports that her blood pressures continue to be in the 130-140s/70-80s.  She has not experienced a headache recently, but does experience headaches when her BP is >160/100.  Her losartan was recently discontinued and changed to Metoprolol.  Confirmed that cardiac medications are accurately reflected in the medical record.  Patient is concerned that her blood pressure is not well managed at this time.  Offered patient sooner appointment with Dr. Reyna and she is agreeable.  She is referred to Dr. Reyna for cholesterol management, but she is requesting his help with blood pressure management as well.  She is not currently taking any medications to manage her cholesterol; she stopped simvastatin late 2015 or early 2016 due to significant muscle pain.  She reports that she has been focused on making dietary changes to improve her cholesterol ujntil she receives further direction from Dr. Reyna.  She understands that I will review her case with Dr. Reyna and contact her with any recommendations prior to her scheduled appointment. She is very appreciative of telephone call.

## 2017-06-13 ENCOUNTER — OFFICE VISIT (OUTPATIENT)
Dept: FAMILY MEDICINE | Facility: CLINIC | Age: 67
End: 2017-06-13
Payer: MEDICARE

## 2017-06-13 VITALS
WEIGHT: 186 LBS | DIASTOLIC BLOOD PRESSURE: 80 MMHG | HEART RATE: 101 BPM | OXYGEN SATURATION: 97 % | SYSTOLIC BLOOD PRESSURE: 124 MMHG | HEIGHT: 68 IN | BODY MASS INDEX: 28.19 KG/M2 | TEMPERATURE: 99.3 F

## 2017-06-13 DIAGNOSIS — I48.0 PAROXYSMAL A-FIB (H): ICD-10-CM

## 2017-06-13 DIAGNOSIS — Z78.9 STATIN INTOLERANCE: ICD-10-CM

## 2017-06-13 DIAGNOSIS — S81.812A LACERATION OF LEG, LEFT, INITIAL ENCOUNTER: Primary | ICD-10-CM

## 2017-06-13 DIAGNOSIS — L03.119 CELLULITIS AND ABSCESS OF LEG: ICD-10-CM

## 2017-06-13 DIAGNOSIS — N18.30 CKD (CHRONIC KIDNEY DISEASE) STAGE 3, GFR 30-59 ML/MIN (H): ICD-10-CM

## 2017-06-13 DIAGNOSIS — I63.50 CEREBRAL ARTERY OCCLUSION WITH CEREBRAL INFARCTION (H): ICD-10-CM

## 2017-06-13 DIAGNOSIS — Z94.4 LIVER REPLACED BY TRANSPLANT (H): ICD-10-CM

## 2017-06-13 DIAGNOSIS — L02.419 CELLULITIS AND ABSCESS OF LEG: ICD-10-CM

## 2017-06-13 PROCEDURE — 99214 OFFICE O/P EST MOD 30 MIN: CPT | Performed by: PHYSICIAN ASSISTANT

## 2017-06-13 RX ORDER — CLINDAMYCIN HCL 300 MG
300 CAPSULE ORAL 4 TIMES DAILY
Qty: 40 CAPSULE | Refills: 0 | Status: SHIPPED | OUTPATIENT
Start: 2017-06-13 | End: 2017-06-23

## 2017-06-13 NOTE — NURSING NOTE
"Chief Complaint   Patient presents with     Laceration     left leg injury onset yesterday while in shower shampoo bottle fell and bounced off floor and hit leg        Initial /80  Pulse 101  Temp 99.3  F (37.4  C) (Tympanic)  Ht 5' 7.5\" (1.715 m)  Wt 186 lb (84.4 kg)  SpO2 97%  BMI 28.7 kg/m2 Estimated body mass index is 28.7 kg/(m^2) as calculated from the following:    Height as of this encounter: 5' 7.5\" (1.715 m).    Weight as of this encounter: 186 lb (84.4 kg).  Medication Reconciliation: complete   Leah Hernandez CMA      "

## 2017-06-13 NOTE — PROGRESS NOTES
SUBJECTIVE:                                                    Luz Thompson is a 67 year old female who presents to clinic today for the following health issues:    Laceration  [place] whom is immunocompromised due to transplant and use of sirolimus injection, apixaban, ursodiol, and voriconazole presents to clinic today with chief complaint of left leg laceration. Injury occurred yesterday (6/12) while patient was showering. Reports that she dropped shampoo bottle which bounced off shower floor and hit leg. Sustained laceration on left medial calf with mild bleeding. Describes new onset redness that developed this morning surrounding laceration. States that laceration has been burning and painful when walking. Denies fever, feelings of warmth/ chills, or general feelings of malaise. Normally wearing compression stockings but has not since sustaining laceration due to discomfort over wound with compression of stocking. She does have a history of blood clot but this was >38 years ago.     Problem list and histories reviewed & adjusted, as indicated.  Additional history: as documented    Patient Active Problem List   Diagnosis     Anemia of other chronic disease     Liver replaced by transplant (H)     Heart murmur     Primary biliary cirrhosis (H)     CKD (chronic kidney disease) stage 3, GFR 30-59 ml/min     GERD (gastroesophageal reflux disease)     Bladder infection, chronic     Osteoporosis     MEDIAL MENISCUS TEAR - right     Osteoarthritis of right knee     Postablative hypothyroidism     Hyperlipidemia with target LDL less than 100     History of thyroid cancer     Cerebral artery occlusion with cerebral infarction (H)     HDL deficiency     Advanced directives, counseling/discussion     Vitamin D deficiency     Diagnostic skin and sensitization tests     Papillary carcinoma of thyroid (H)     Anemia, iron deficiency     S/P tympanoplasty     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     Hypertension goal BP (blood pressure) <  "140/80     Status post cataract surgery     Urinary frequency     Diverticulosis of sigmoid colon     Aftercare following organ transplant     Prophylactic antibiotic     Syncope, vasovagal     Slow transit constipation     High risk medication use     Statin intolerance     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     Trochanteric bursitis of left hip     Klebsiella infection     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)     Coccidioidomycosis     Coccidioidal pneumonitis (H)     SNHL (sensorineural hearing loss)     Bilateral impacted cerumen     Paroxysmal a-fib (H)     Past Surgical History:   Procedure Laterality Date     APPENDECTOMY  1961     BIOPSY       CATARACT IOL, RT/LT      RE12/19/2013, LE12/10/2013 - Toric lenses     CHOLECYSTECTOMY  1991     COLONOSCOPY       COLONOSCOPY  3/10/2014    Procedure: COLONOSCOPY;;  Surgeon: Gentry Ramirez MD;  Location: [facility] GI     ENDOSCOPIC RETROGRADE CHOLANGIOPANCREATOGRAM  9/19/2013    Procedure: ENDOSCOPIC RETROGRADE CHOLANGIOPANCREATOGRAM;  Endoscopic Retrograde Cholangiopancreatogram with single balloon enteroscopy, ballon sweep of bile duct;  Surgeon: Brett Membreno MD;  Location: [facility] OR     ENT SURGERY      ear drum repair     HC KNEE SCOPE,MED/LAT MENISECTOMY  8/10/12    Right, partial medial menisectomy only     KNEE SURGERY  1966    R knee     PICC INSERTION  9/18/2013    4fr SL PASV PICC, 40cm (1cm external) in the R basilic vein w/ tip in the low SVC     PICC INSERTION  2/21/2014    5 fr DL BioFlo Navilyst PICC, 46 cm (3 cm external) in the L basilic vein w/ tip in the SVC RA junction.     THYROIDECTOMY  3/2010     TRANSPLANT LIVER RECIPIENT LIVING UNRELATED         Social History   Substance Use Topics     Smoking status: Former Smoker     Packs/day: 1.00     Years: 18.00     Types: Cigarettes     Quit date: 4/12/1985     Smokeless tobacco: Never Used     Alcohol use 0.0 oz/week     0 Standard drinks or equivalent per week      Comment: rare - \"I " "toast at weddings\"     Family History   Problem Relation Age of Onset     Hypertension Mother      Lipids Mother      Prostate Cancer Father      Macular Degeneration Father      Cancer - colorectal Maternal Grandmother      in her 80's, has surgery and removal of part of kidney,  at age 98     Colon Cancer Maternal Grandmother      Eye Disorder Maternal Grandfather      Glaucoma     HEART DISEASE Maternal Grandfather       at 98     Glaucoma Maternal Grandfather      CEREBROVASCULAR DISEASE Paternal Grandmother      in her 80's     Hypertension Paternal Grandmother      HEART DISEASE Paternal Grandfather      MI     Allergies Son      Neurologic Disorder Daughter      Migraines     Anesthesia Reaction No family hx of      Crohn Disease No family hx of      Ulcerative Colitis No family hx of          Current Outpatient Prescriptions   Medication Sig Dispense Refill     clindamycin (CLEOCIN) 300 MG capsule Take 1 capsule (300 mg) by mouth 4 times daily for 10 days 40 capsule 0     ASPIRIN NOT PRESCRIBED (INTENTIONAL) Please choose reason not prescribed, below 0 each 0     STATIN NOT PRESCRIBED, INTENTIONAL, Please choose reason not prescribed, below       hydrALAZINE (APRESOLINE) 25 MG tablet Take 0.5 tablets (12.5 mg) by mouth 3 times daily as needed , if systolic blood pressure is more than 140 mmHg. 270 tablet 3     levothyroxine (SYNTHROID/LEVOTHROID) 150 MCG tablet Take one tablet daily 6 days a week and one and a half tablets one day a week. 96 tablet 3     calcitRIOL (ROCALTROL) 0.5 MCG capsule Take 1 capsule (0.5 mcg) by mouth daily 90 capsule 3     voriconazole (VFEND) 200 MG tablet Take 1 tablet (200 mg) by mouth 2 times daily 60 tablet 11     apixaban ANTICOAGULANT (ELIQUIS) 5 MG tablet Take 1 tablet (5 mg) by mouth 2 times daily 60 tablet 3     metoprolol (TOPROL-XL) 25 MG 24 hr tablet Take 1 tablet (25 mg) by mouth daily 30 tablet 3     predniSONE (DELTASONE) 5 MG tablet Take 1 tablet (5 mg) by " mouth daily 90 tablet 3     RAPAMUNE 0.5 MG PO TABLET Take 0.5 mg by mouth every other day 15 tablet 11     fluticasone-vilanterol (BREO ELLIPTA) 100-25 MCG/INH oral inhaler Inhale 1 puff into the lungs daily Reported on 4/25/2017       albuterol (ALBUTEROL) 108 (90 BASE) MCG/ACT Inhaler Inhale 2 puffs into the lungs every 4 hours as needed for shortness of breath / dyspnea or wheezing Reported on 4/25/2017       clotrimazole (LOTRIMIN) 1 % cream Apply topically 2 times daily Reported on 4/25/2017       ursodiol (ACTIGALL) 250 MG tablet Take 1 tablet (250 mg) by mouth 2 times daily 180 tablet 3     Wheat Dextrin (BENEFIBER) POWD 2 teaspoons daily       SUMAtriptan (IMITREX) 50 MG tablet Take 1 tablet (50 mg) by mouth at onset of headache for migraine repeat after 2 hours if needed. 30 tablet 3     meclizine (ANTIVERT) 25 MG tablet Take 1 tablet (25 mg) by mouth as needed 30 tablet 11     folic acid (FOLVITE) 1 MG tablet Take 1 tablet (1 mg) by mouth daily 90 tablet 3     furosemide (LASIX) 20 MG tablet Take 1 tablet (20 mg) by mouth every other day 90 tablet 3     STATIN NOT PRESCRIBED, INTENTIONAL, Statin not prescribed intentionally due to Intolerance (with supporting documentation of trying a statin at least once within the last 5 years) 0 each 0     Multiple Vitamins-Minerals (PRESERVISION AREDS 2) CAPS Take 2 capsules by mouth daily       triamcinolone (KENALOG) 0.1 % cream Apply topically 2 times daily Apply to rash 80 g 3     Hypromellose (ARTIFICIAL TEARS OP) Apply 1 drop to eye as needed       acetaminophen 500 MG CAPS Take 500 mg by mouth as needed Take 500-1,000 mg by mouth every 6 hours if needed.       magnesium 250 MG tablet Take 2 tablets by mouth daily At night.        [DISCONTINUED] calcitRIOL (ROCALTROL) 0.25 MCG capsule Take 2 tablets daily (Patient not taking: Reported on 6/13/2017) 180 capsule 3     Allergies   Allergen Reactions     Fluconazole Hives and Itching     Azithromycin Itching      "Benadryl [Diphenhydramine Hcl]      Insomnia      Cefpodoxime Itching     Cellcept Diarrhea     Ciprofloxacin Other (See Comments)     Insomnia, mood lability     Codeine      Psych disturbance     Diphenhydramine Other (See Comments)     Doxycycline      Lansoprazole Diarrhea     Levaquin [Levofloxacin] Other (See Comments)     Headache, hyperactivity     Lisinopril Cough     Methotrexate      Sores     Morphine Itching     Morphine Sulfate Itching     Mycophenolate Diarrhea     Pepcid Diarrhea     Prevacid Diarrhea     Ranitidine Diarrhea     Simvastatin Muscle Pain (Myalgia)     severe     Zantac Diarrhea     Cephalexin Rash     Fever and skin burning     Penicillin G Itching and Rash     Penicillins Rash     Tolectin [Nsaids] Rash     Tolmetin Rash     Tramadol Rash       Reviewed and updated as needed this visit by clinical staff  Tobacco  Allergies  Meds  Problems  Med Hx  Surg Hx  Fam Hx  Soc Hx        Reviewed and updated as needed this visit by Provider  Tobacco  Allergies  Meds  Problems  Med Hx  Surg Hx  Fam Hx  Soc Hx          ROS:  Constitutional, HEENT, cardiovascular, pulmonary, GI, , musculoskeletal, neuro, skin, endocrine and psych systems are negative, except as otherwise noted.    This document serves as a record of the services and decisions personally performed and made by Cinthia Billy PA-C. It was created on her behalf by Shavon Kimble, a trained medical scribe. The creation of this document is based the provider's statements to the medical scribe.  Shavon Kimble, June 13, 2017 2:40 PM    OBJECTIVE:                                                    /80  Pulse 101  Temp 99.3  F (37.4  C) (Tympanic)  Ht 5' 7.5\" (1.715 m)  Wt 186 lb (84.4 kg)  SpO2 97%  BMI 28.7 kg/m2  Body mass index is 28.7 kg/(m^2).     GENERAL: healthy, alert and no distress  SKIN: full thickness disruption with mild surrounding erythema and warmth on medial aspect of left lower leg measuring 15 mm " x 23 mm   NEURO: Normal strength and tone, mentation intact and speech normal  PSYCH: mentation appears normal, affect normal/bright    Diagnostic Test Results:  none      ASSESSMENT/PLAN:                                                    [place] was seen today for laceration.    Diagnoses and all orders for this visit:    Laceration of leg, left, initial encounter, Cellulitis and abscess of leg, CKD (chronic kidney disease) stage 3, GFR 30-59 ml/min - pt is immunocompromised - will treat early due to this factor.  Advised patient to do warm soaks. OK to continue not wearing compression stocking while experiencing pain from laceration. Provided patient with dressings to take home to change daily. Begin taking clindamycin QID for 10 days as prescribed.   -     clindamycin (CLEOCIN) 300 MG capsule; Take 1 capsule (300 mg) by mouth 4 times daily for 10 days    The information in this document, created by the medical scribe for me, accurately reflects the services I personally performed and the decisions made by me. I have reviewed and approved this document for accuracy prior to leaving the patient care area .  Cinthia Billy PA-C June 13, 2017 2:41 PM    Cinthia Billy PA-C  [facility]

## 2017-06-13 NOTE — MR AVS SNAPSHOT
After Visit Summary   6/13/2017    Luz Thompson    MRN: [id number]           Patient Information     Date Of Birth          1950        Visit Information        Provider Department      6/13/2017 2:20 PM Cinthia Billy PA-C [facility] ([facility] [place]        Today's Diagnoses     Laceration of leg, left, initial encounter    -  1    Cellulitis and abscess of leg        CKD (chronic kidney disease) stage 3, GFR 30-59 ml/min           Follow-ups after your visit        Your next 10 appointments already scheduled     Jun 29, 2017  2:00 PM CDT   (Arrive by 1:45 PM)   New Patient Visit with Randell Reyna MD   [facility] ([facility] [place])    [address]  3rd Floor  [facility] [id number] [phone]            Jul 12, 2017  9:30 AM CDT   RETURN RETINA with Meri Frost MD   [facility] ([facility])    [address]  [address]  [address] [address]  [facility] [address]   [phone]            Jul 12, 2017 10:30 AM CDT   RETURN GENERAL with Dora Tovar MD   [facility] ([facility])    [address]  [address]  [address] [address]  [facility] [address]   [phone]            Jul 31, 2017  9:20 AM CDT   (Arrive by 9:05 AM)   CT CHEST W/O CONTRAST with UCCT1   [facility] [place] [facility] ([facility] [place])    [address]  1st Floor  [facility] [address] [phone]           Please bring any scans or X-rays taken at other hospitals, if similar tests were done. Also bring a list of your medicines, including vitamins, minerals and over-the-counter drugs. It is safest to leave personal items at home.  Be sure to tell your doctor:   If you have any allergies.   If there s any chance you are pregnant.   If you are breastfeeding.   If you have any special needs.  You do not need to do anything special to prepare.  Please wear loose clothing, such as a sweat  suit or jogging clothes. Avoid snaps, zippers and other metal. We may ask you to undress and put on a hospital gown.            Jul 31, 2017 10:30 AM CDT   (Arrive by 10:15 AM)   Return Visit with Eden Clinton MD   [facility] ([facility])    [address]  3rd [facility] [address]   [phone]            Jul 31, 2017  1:00 PM CDT   (Arrive by 12:45 PM)   PAC Pharmacist with  Pac Pharmacist   [facility] [place] ([facility])    [address]  4th [facility] [id number] [phone]            Jul 31, 2017  1:30 PM CDT   (Arrive by 1:15 PM)   PAC EVALUATION with  Pac Deidre 3   [facility] [place] ([facility])    [address] [place] [id number] [phone]            Jul 31, 2017  2:30 PM CDT   (Arrive by 2:15 PM)   PAC RN ASSESSMENT with  Pac Rn   [facility] [place] ([facility])    [address] [place] [id number] [phone]            Jul 31, 2017  3:10 PM CDT   (Arrive by 2:55 PM)   PAC Anesthesia Consult with  Pac Anesthesiologist   [facility] [place] ([facility])    [address] [place] [address]   [phone]            Aug 14, 2017  1:00 PM CDT   (Arrive by 12:45 PM)   ATRIAL FIBULATION VISIT with Graham Gilmore MD   [facility] ([facility])    [address] [place] [id number] [phone]              Who to contact     If you have questions or need follow up information about today's clinic visit or your schedule please contact [facility] directly at [phone].  Normal or non-critical lab and imaging results will be communicated to you by Haja  "letter or phone within 4 business days after the clinic has received the results. If you do not hear from us within 7 days, please contact the clinic through Certain or phone. If you have a critical or abnormal lab result, we will notify you by phone as soon as possible.  Submit refill requests through Certain or call your pharmacy and they will forward the refill request to us. Please allow 3 business days for your refill to be completed.          Additional Information About Your Visit        [email or web address] Information     Certain gives you secure access to your electronic health record. If you see a primary care provider, you can also send messages to your care team and make appointments. If you have questions, please call your primary care clinic.  If you do not have a primary care provider, please call [phone] and they will assist you.        Care EveryWhere ID     This is your Care EveryWhere ID. This could be used by other organizations to access your [place] medical records  [id number]        Your Vitals Were     Pulse Temperature Height Pulse Oximetry BMI (Body Mass Index)       101 99.3  F (37.4  C) (Tympanic) 5' 7.5\" (1.715 m) 97% 28.7 kg/m2        Blood Pressure from Last 3 Encounters:   06/13/17 124/80   05/24/17 178/65   05/15/17 146/77    Weight from Last 3 Encounters:   06/13/17 186 lb (84.4 kg)   05/24/17 189 lb 4.8 oz (85.9 kg)   05/15/17 192 lb 4.8 oz (87.2 kg)              Today, you had the following     No orders found for display         Today's Medication Changes          These changes are accurate as of: 6/13/17  2:37 PM.  If you have any questions, ask your nurse or doctor.               Start taking these medicines.        Dose/Directions    clindamycin 300 MG capsule   Commonly known as:  CLEOCIN   Used for:  Laceration of leg, left, initial encounter, Cellulitis and abscess of leg, CKD (chronic kidney disease) stage 3, GFR 30-59 ml/min   Started by:  Cinthia Billy PA-C        " Dose:  300 mg   Take 1 capsule (300 mg) by mouth 4 times daily for 10 days   Quantity:  40 capsule   Refills:  0            Where to get your medicines      These medications were sent to [facility] [place], [place] - [address]  [address] [address]     Phone:  [phone]     clindamycin 300 MG capsule                Primary Care Provider Office Phone # Fax #    Jennifer Barraza [phone]       [facility] [address]  [facility] [address]        Thank you!     Thank you for choosing [facility]  for your care. Our goal is always to provide you with excellent care. Hearing back from our patients is one way we can continue to improve our services. Please take a few minutes to complete the written survey that you may receive in the mail after your visit with us. Thank you!             Your Updated Medication List - Protect others around you: Learn how to safely use, store and throw away your medicines at www.disposemymeds.org.          This list is accurate as of: 6/13/17  2:37 PM.  Always use your most recent med list.                   Brand Name Dispense Instructions for use    acetaminophen 500 MG Caps      Take 500 mg by mouth as needed Take 500-1,000 mg by mouth every 6 hours if needed.       albuterol 108 (90 BASE) MCG/ACT Inhaler   Generic drug:  albuterol      Inhale 2 puffs into the lungs every 4 hours as needed for shortness of breath / dyspnea or wheezing Reported on 4/25/2017       apixaban ANTICOAGULANT 5 MG tablet    ELIQUIS    60 tablet    Take 1 tablet (5 mg) by mouth 2 times daily       ARTIFICIAL TEARS OP      Apply 1 drop to eye as needed       BENEFIBER Powd      2 teaspoons daily       BREO ELLIPTA 100-25 MCG/INH oral inhaler   Generic drug:  fluticasone-vilanterol      Inhale 1 puff into the lungs daily Reported on 4/25/2017       calcitRIOL 0.5 MCG capsule    ROCALTROL    90  capsule    Take 1 capsule (0.5 mcg) by mouth daily       clindamycin 300 MG capsule    CLEOCIN    40 capsule    Take 1 capsule (300 mg) by mouth 4 times daily for 10 days       clotrimazole 1 % cream    LOTRIMIN     Apply topically 2 times daily Reported on 4/25/2017       folic acid 1 MG tablet    FOLVITE    90 tablet    Take 1 tablet (1 mg) by mouth daily       furosemide 20 MG tablet    LASIX    90 tablet    Take 1 tablet (20 mg) by mouth every other day       hydrALAZINE 25 MG tablet    APRESOLINE    270 tablet    Take 0.5 tablets (12.5 mg) by mouth 3 times daily as needed , if systolic blood pressure is more than 140 mmHg.       levothyroxine 150 MCG tablet    SYNTHROID/LEVOTHROID    96 tablet    Take one tablet daily 6 days a week and one and a half tablets one day a week.       magnesium 250 MG tablet      Take 2 tablets by mouth daily At night.       meclizine 25 MG tablet    ANTIVERT    30 tablet    Take 1 tablet (25 mg) by mouth as needed       metoprolol 25 MG 24 hr tablet    TOPROL-XL    30 tablet    Take 1 tablet (25 mg) by mouth daily       predniSONE 5 MG tablet    DELTASONE    90 tablet    Take 1 tablet (5 mg) by mouth daily       PRESERVISION AREDS 2 Caps      Take 2 capsules by mouth daily       sirolimus Tabs tablet     15 tablet    Take 0.5 mg by mouth every other day       STATIN NOT PRESCRIBED (INTENTIONAL)     0 each    Statin not prescribed intentionally due to Intolerance (with supporting documentation of trying a statin at least once within the last 5 years)       SUMAtriptan 50 MG tablet    IMITREX    30 tablet    Take 1 tablet (50 mg) by mouth at onset of headache for migraine repeat after 2 hours if needed.       triamcinolone 0.1 % cream    KENALOG    80 g    Apply topically 2 times daily Apply to rash       ursodiol 250 MG tablet    ACTIGALL    180 tablet    Take 1 tablet (250 mg) by mouth 2 times daily       voriconazole 200 MG tablet    VFEND    60 tablet    Take 1 tablet (200 mg) by  mouth 2 times daily

## 2017-06-26 NOTE — PROGRESS NOTES
"[facility] Dermatology Note    Last Visit: 7/18/16    CC:   Chief Complaint   Patient presents with     Derm Problem     Virginia is here today for a 1 month follow up for a spot on her foot. virginia notes\" the spot is not 100% gone but its better\"       Encounter Date: May 23, 2017    Dermatology Problem List  1. S/p liver transplant for PBC (2002)   - current: sirolimus, prednisone   2. Pruritic papular skin eruption-self-induced, resolved  3. Mild atopic dermatitis- hands, thumbs, abdomen, back   - previous: Amlactin, patient discontinued   4. Actinic keratoses- right temple, left cheek, s/p LN2  5. onychomycosis-  Oral voriconazole, prescribed by Dr. Montero ([facility])   6. Drug hypersensitivity reaction- fluconazole  - residual lesions of abdomen and chest   7. Atrophe shanna    History of Present Illness  Luz Thompson is a 67 year old female with a PMH liver transplant for PBC presenting today for a focused follow up of AK and atrophe shanna treated at her last visit one month ago. She feels scaly lesions treated with LN at last visit have improved --- they became red, peeled, and are now smooth. Her legs have improved with use of a stronger topical steroid. Color change is still noted. No other concerns today. Her drug rash has cleared as well..         Detailed Review of Systems  ROS as in HPI.  Otherwise nml state of health. No other skin concerns.    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     Diagnostic skin and sensitization tests NOT ALLERGIC TO CORN/MILK PER IGE TESTS    10/25/12 IgE tests for corn/milk NEGATIVE     Diverticulosis of sigmoid colon 12/21/2013     GERD (gastroesophageal reflux disease)      H/O esophageal varices      H/O liver " "transplant (H) 2002    due to primary biliary cirrhosis     Heart murmur 4/4/2012     Hyperlipidemia 4/10/2012    Says that she does not have it anymore, not on meds     Hypocalcemia      MEDIAL MENISCUS TEAR - right 8/2/2012     Migraines 4/4/2012     Osteoarthritis of right knee 8/2/2012     Osteoporosis 4/20/2012     Papillary thyroid carcinoma (H)      Post-surgical hypothyroidism      Primary biliary cirrhosis (H)     s/p Liver transplant, [id number]     Sjogren's syndrome (H)      Thyroid cancer (H)      Unspecified cerebral artery occlusion with cerebral infarction 2001    when BP was very low, small multiple infacts in frontal lobe, had \"visual field cut,\" leg weakness, and expressive aphasia - all have resolved.      Unspecified nonsenile cataract      Vitamin D deficiency 10/1/2012     VRE (vancomycin-resistant Enterococci)        Medications  Current Outpatient Prescriptions   Medication     ASPIRIN NOT PRESCRIBED (INTENTIONAL)     STATIN NOT PRESCRIBED, INTENTIONAL,     hydrALAZINE (APRESOLINE) 25 MG tablet     levothyroxine (SYNTHROID/LEVOTHROID) 150 MCG tablet     calcitRIOL (ROCALTROL) 0.5 MCG capsule     voriconazole (VFEND) 200 MG tablet     apixaban ANTICOAGULANT (ELIQUIS) 5 MG tablet     metoprolol (TOPROL-XL) 25 MG 24 hr tablet     predniSONE (DELTASONE) 5 MG tablet     RAPAMUNE 0.5 MG PO TABLET     fluticasone-vilanterol (BREO ELLIPTA) 100-25 MCG/INH oral inhaler     albuterol (ALBUTEROL) 108 (90 BASE) MCG/ACT Inhaler     clotrimazole (LOTRIMIN) 1 % cream     ursodiol (ACTIGALL) 250 MG tablet     Wheat Dextrin (BENEFIBER) POWD     SUMAtriptan (IMITREX) 50 MG tablet     meclizine (ANTIVERT) 25 MG tablet     folic acid (FOLVITE) 1 MG tablet     furosemide (LASIX) 20 MG tablet     STATIN NOT PRESCRIBED, INTENTIONAL,     Multiple Vitamins-Minerals (PRESERVISION AREDS 2) CAPS     triamcinolone (KENALOG) 0.1 % cream     Hypromellose (ARTIFICIAL TEARS OP)     acetaminophen 500 MG CAPS     magnesium 250 " "MG tablet     No current facility-administered medications for this visit.      Allergies  Allergies   Allergen Reactions     Fluconazole Hives and Itching     Azithromycin Itching     Benadryl [Diphenhydramine Hcl]      Insomnia      Cefpodoxime Itching     Cellcept Diarrhea     Ciprofloxacin Other (See Comments)     Insomnia, mood lability     Codeine      Psych disturbance     Diphenhydramine Other (See Comments)     Doxycycline      Lansoprazole Diarrhea     Levaquin [Levofloxacin] Other (See Comments)     Headache, hyperactivity     Lisinopril Cough     Methotrexate      Sores     Morphine Itching     Morphine Sulfate Itching     Mycophenolate Diarrhea     Pepcid Diarrhea     Prevacid Diarrhea     Ranitidine Diarrhea     Simvastatin Muscle Pain (Myalgia)     severe     Zantac Diarrhea     Cephalexin Rash     Fever and skin burning     Penicillin G Itching and Rash     Penicillins Rash     Tolectin [Nsaids] Rash     Tolmetin Rash     Tramadol Rash       Social History  Social History     Social History     Marital status:      Spouse name: Robbin Thompson     Number of children: 4     Years of education: 20     Occupational History     [employer]  [employer]     social work      Self     Social History Main Topics     Smoking status: Former Smoker     Packs/day: 1.00     Years: 18.00     Types: Cigarettes     Quit date: 4/12/1985     Smokeless tobacco: Never Used     Alcohol use 0.0 oz/week     0 Standard drinks or equivalent per week      Comment: rare - \"I toast at weddings\"     Drug use: No     Sexual activity: Yes     Partners: Male     Birth control/ protection: Post-menopausal     Other Topics Concern     Exercise Yes     cardio and strengthing     Social History Narrative    She is retired. She and her  travel around the [place] in an [place]. [place] in [place] the course of the [place]. 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   Intermittent use of sun protection.       Family History  Family History   Problem Relation Age of Onset     Hypertension Mother      Lipids Mother      Prostate Cancer Father      Macular Degeneration Father      Cancer - colorectal Maternal Grandmother      in her 80's, has surgery and removal of part of kidney,  at age 98     Colon Cancer Maternal Grandmother      Eye Disorder Maternal Grandfather      Glaucoma     HEART DISEASE Maternal Grandfather       at 98     Glaucoma Maternal Grandfather      CEREBROVASCULAR DISEASE Paternal Grandmother      in her 80's     Hypertension Paternal Grandmother      HEART DISEASE Paternal Grandfather      MI     Allergies Son      Neurologic Disorder Daughter      Migraines     Anesthesia Reaction No family hx of      Crohn Disease No family hx of      Ulcerative Colitis No family hx of    maternal grandfather had skin cancer, but patient is not aware what kind       Physical Exam  There were no vitals taken for this visit.  GEN: NAD, alert and appropriately responsive, pleasant mood  SKIN:   -Focused skin, which includes the head/face, both arms, back, abdomen, both legs, digits and/or nails, was examined.  - Previously noted erythematous macules with overyling adherent scale on the face have cleared  - diffuse dermatitis has cleared from arms, hands, torso.  -scattered pink scaling plaques on the abdomen and back   -Ovoid pink patch on the dorsum of the right foot with atrophic macules within --- improved.  -previously noted 3mm erosion on right tragus has cleared  - no other lesions of concern were noted in areas examined     Assessment & Plan:  1. Drug hypersensitivity eruption, likely related to fluconazole given temporal association. Clear today.    2. Actinic keratoses- face: lesions treated at last visit are clear.  -reviewed sun protection recommendations with patient and answered questions      3.  Atrophe shanna - improved from last visit and less active today. Pulses palpable. OK to continue triamcinolone once daily for 1-2 more week, then stop. Moisturize daily to twice daily.     4. Scabbed lesion of R tragus-clear today. Felt was secondary to irritation from hearing aid at last visit. Monitor.        Follow-up in 5 months for TBSE, or sooner if concerns.       Staff only:  Mavis Bermudez MD, PhD    Department of Dermatology

## 2017-06-29 ENCOUNTER — HOSPITAL ENCOUNTER (OUTPATIENT)
Dept: CARDIOLOGY | Facility: CLINIC | Age: 67
Discharge: HOME | End: 2017-06-29
Attending: INTERNAL MEDICINE | Admitting: INTERNAL MEDICINE
Payer: MEDICARE

## 2017-06-29 ENCOUNTER — OFFICE VISIT (OUTPATIENT)
Dept: CARDIOLOGY | Facility: CLINIC | Age: 67
End: 2017-06-29
Attending: INTERNAL MEDICINE
Payer: MEDICARE

## 2017-06-29 VITALS
DIASTOLIC BLOOD PRESSURE: 79 MMHG | BODY MASS INDEX: 28.64 KG/M2 | HEART RATE: 76 BPM | HEIGHT: 68 IN | WEIGHT: 189 LBS | SYSTOLIC BLOOD PRESSURE: 149 MMHG | OXYGEN SATURATION: 100 %

## 2017-06-29 DIAGNOSIS — N18.30 CKD (CHRONIC KIDNEY DISEASE) STAGE 3, GFR 30-59 ML/MIN (H): ICD-10-CM

## 2017-06-29 DIAGNOSIS — E78.5 HYPERLIPIDEMIA WITH TARGET LDL LESS THAN 100: ICD-10-CM

## 2017-06-29 DIAGNOSIS — Z94.4 LIVER REPLACED BY TRANSPLANT (H): ICD-10-CM

## 2017-06-29 DIAGNOSIS — R03.0 ELEVATED BLOOD PRESSURE READING WITHOUT DIAGNOSIS OF HYPERTENSION: ICD-10-CM

## 2017-06-29 DIAGNOSIS — R03.0 ELEVATED BLOOD PRESSURE READING WITHOUT DIAGNOSIS OF HYPERTENSION: Primary | ICD-10-CM

## 2017-06-29 PROCEDURE — 99214 OFFICE O/P EST MOD 30 MIN: CPT | Mod: 25 | Performed by: INTERNAL MEDICINE

## 2017-06-29 PROCEDURE — 93790 AMBL BP MNTR W/SW I&R: CPT | Performed by: INTERNAL MEDICINE

## 2017-06-29 PROCEDURE — 99212 OFFICE O/P EST SF 10 MIN: CPT | Mod: ZF

## 2017-06-29 RX ORDER — PRAVASTATIN SODIUM 10 MG
10 TABLET ORAL DAILY
Qty: 90 TABLET | Refills: 3 | Status: SHIPPED | OUTPATIENT
Start: 2017-06-29 | End: 2017-07-12

## 2017-06-29 RX ORDER — FUROSEMIDE 20 MG
10 TABLET ORAL DAILY
Qty: 46 TABLET | Refills: 3 | Status: SHIPPED | OUTPATIENT
Start: 2017-06-29 | End: 2018-08-03

## 2017-06-29 ASSESSMENT — PAIN SCALES - GENERAL: PAINLEVEL: NO PAIN (0)

## 2017-06-29 NOTE — PATIENT INSTRUCTIONS
Change your Lasix to 10 mg by mouth once daily every morning.   Start Pravastatin 5 mg (one-half tablet) by mouth once daily.  Obtain fasting labs in late August or September.   STOP immediately if you develop muscle pain or weakness.     Schedule 24 hour blood pressure monitor.      Return to clinic in 1 year.  Fasting labs will be needed prior to this appointment.     Please do not hesitate to call if you have any cardiology related questions or concerns, or need to schedule an appointment, at [phone].         Cardiology Medication Refill Request Information:  * Please contact your pharmacy regarding ANY request for medication refills.  ** [facility] Prescription Fax = [phone]  * Please allow 3 business days for routine medication refills.    Cardiology Test Result notification information:  *You will be notified with in 7-10 days of your appointment day regarding the results of your test. If you are on MyChart you will be notified as soon as the provider has reviewed the results and signed off on them. Please call RN Care Coordinator with questions regarding results.

## 2017-06-29 NOTE — LETTER
6/29/2017      RE: Luz Thompson  [address]   [facility] [address]       Dear Colleague,    Thank you for the opportunity to participate in the care of your patient, Luz Thompson, at the [facility]. Please see a copy of my visit note below.    CARDIOLOGY CLINIC NEW VISIT    HPI:  Ms Thompson is a 68 yo female with hyperlipidemia who is referred for further management of the same by Dr. Graham Gilmore, whom she sees for paroxysmal atrial fibrillation.  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  She has elevated LDL (198), total cholesterol (322), and triglyceride (448) levels.  When on simvastatin she developed severe myalgias although her CK was not elevated.  She has been keeping a log of her BPs, which typically run 120s-130s/70s but occasionally are in the 140s/80s.  She has previously sent [employer] messages about having BPs as high as the 150s-160s/90s.    She denies experiencing any chest pain, dyspnea, lightheadedness, syncope, or worsening peripheral edema or abdominal bloating.      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     Diagnostic skin and sensitization tests NOT ALLERGIC TO CORN/MILK PER IGE TESTS    10/25/12 IgE tests for corn/milk NEGATIVE     Diverticulosis of sigmoid colon 12/21/2013     GERD (gastroesophageal reflux disease)      H/O esophageal varices      H/O liver transplant (H) 2002    due to primary biliary cirrhosis     Heart murmur 4/4/2012     Hyperlipidemia 4/10/2012    Says that she does not have it anymore, not on meds     Hypocalcemia      MEDIAL MENISCUS TEAR - right 8/2/2012     Migraines  "2012     Osteoarthritis of right knee 2012     Osteoporosis 2012     Papillary thyroid carcinoma (H)      Post-surgical hypothyroidism      Primary biliary cirrhosis (H)     s/p Liver transplant, 5599-4111     Sjogren's syndrome (H)      Thyroid cancer (H)      Unspecified cerebral artery occlusion with cerebral infarction     when BP was very low, small multiple infacts in frontal lobe, had \"visual field cut,\" leg weakness, and expressive aphasia - all have resolved.      Unspecified nonsenile cataract      Vitamin D deficiency 10/1/2012     VRE (vancomycin-resistant Enterococci)      Past Surgical History:   Procedure Laterality Date     APPENDECTOMY       BIOPSY       CATARACT IOL, RT/LT      RE2013, LE12/10/2013 - Toric lenses     CHOLECYSTECTOMY       COLONOSCOPY       COLONOSCOPY  3/10/2014    Procedure: COLONOSCOPY;;  Surgeon: Gentry Ramirez MD;  Location:  GI     ENDOSCOPIC RETROGRADE CHOLANGIOPANCREATOGRAM  2013    Procedure: ENDOSCOPIC RETROGRADE CHOLANGIOPANCREATOGRAM;  Endoscopic Retrograde Cholangiopancreatogram with single balloon enteroscopy, ballon sweep of bile duct;  Surgeon: Brett Membreno MD;  Location: [facility] OR     ENT SURGERY      ear drum repair     HC KNEE SCOPE,MED/LAT MENISECTOMY  8/10/12    Right, partial medial menisectomy only     KNEE SURGERY  1966    R knee     PICC INSERTION  2013    4fr SL PASV PICC, 40cm (1cm external) in the R basilic vein w/ tip in the low SVC     PICC INSERTION  2014    5 fr DL BioFlo Navilyst PICC, 46 cm (3 cm external) in the L basilic vein w/ tip in the SVC RA junction.     THYROIDECTOMY  3/2010     TRANSPLANT LIVER RECIPIENT LIVING UNRELATED       Family History   Problem Relation Age of Onset     Hypertension Mother      Lipids Mother      Prostate Cancer Father      Macular Degeneration Father      Cancer - colorectal Maternal Grandmother      in her 80's, has surgery and removal of part of kidney,  at " "age 98     Colon Cancer Maternal Grandmother      Eye Disorder Maternal Grandfather      Glaucoma     HEART DISEASE Maternal Grandfather       at 98     Glaucoma Maternal Grandfather      CEREBROVASCULAR DISEASE Paternal Grandmother      in her 80's     Hypertension Paternal Grandmother      HEART DISEASE Paternal Grandfather      MI     Allergies Son      Neurologic Disorder Daughter      Migraines     Anesthesia Reaction No family hx of      Crohn Disease No family hx of      Ulcerative Colitis No family hx of      Social History     Social History     Marital status:      Spouse name: Robbin Thompson     Number of children: 4     Years of education: 20     Occupational History     [employer]  [employer]     social work      Self     Social History Main Topics     Smoking status: Former Smoker     Packs/day: 1.00     Years: 18.00     Types: Cigarettes     Quit date: 1985     Smokeless tobacco: Never Used     Alcohol use 0.0 oz/week     0 Standard drinks or equivalent per week      Comment: rare - \"I toast at weddings\"     Drug use: No     Sexual activity: Yes     Partners: Male     Birth control/ protection: Post-menopausal     Other Topics Concern     Exercise Yes     cardio and strengthing     Social History Narrative    She is retired. She and her  travel around the [place] in an RV. [place] in the [place] the course of the winter. 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       Current Outpatient Prescriptions on File Prior to Visit:  ASPIRIN NOT PRESCRIBED (INTENTIONAL) Please choose reason not prescribed, below   STATIN NOT PRESCRIBED, INTENTIONAL, Please choose reason not prescribed, below   hydrALAZINE (APRESOLINE) 25 MG tablet Take 0.5 tablets (12.5 mg) by mouth 3 times daily as needed , if systolic blood pressure is more than 140 mmHg.   levothyroxine " (SYNTHROID/LEVOTHROID) 150 MCG tablet Take one tablet daily 6 days a week and one and a half tablets one day a week.   calcitRIOL (ROCALTROL) 0.5 MCG capsule Take 1 capsule (0.5 mcg) by mouth daily   voriconazole (VFEND) 200 MG tablet Take 1 tablet (200 mg) by mouth 2 times daily   apixaban ANTICOAGULANT (ELIQUIS) 5 MG tablet Take 1 tablet (5 mg) by mouth 2 times daily   metoprolol (TOPROL-XL) 25 MG 24 hr tablet Take 1 tablet (25 mg) by mouth daily   predniSONE (DELTASONE) 5 MG tablet Take 1 tablet (5 mg) by mouth daily   RAPAMUNE 0.5 MG PO TABLET Take 0.5 mg by mouth every other day   fluticasone-vilanterol (BREO ELLIPTA) 100-25 MCG/INH oral inhaler Inhale 1 puff into the lungs daily Reported on 4/25/2017   albuterol (ALBUTEROL) 108 (90 BASE) MCG/ACT Inhaler Inhale 2 puffs into the lungs every 4 hours as needed for shortness of breath / dyspnea or wheezing Reported on 4/25/2017   clotrimazole (LOTRIMIN) 1 % cream Apply topically 2 times daily Reported on 4/25/2017   ursodiol (ACTIGALL) 250 MG tablet Take 1 tablet (250 mg) by mouth 2 times daily   Wheat Dextrin (BENEFIBER) POWD 2 teaspoons daily   SUMAtriptan (IMITREX) 50 MG tablet Take 1 tablet (50 mg) by mouth at onset of headache for migraine repeat after 2 hours if needed.   meclizine (ANTIVERT) 25 MG tablet Take 1 tablet (25 mg) by mouth as needed   folic acid (FOLVITE) 1 MG tablet Take 1 tablet (1 mg) by mouth daily   STATIN NOT PRESCRIBED, INTENTIONAL, Statin not prescribed intentionally due to Intolerance (with supporting documentation of trying a statin at least once within the last 5 years)   Multiple Vitamins-Minerals (PRESERVISION AREDS 2) CAPS Take 2 capsules by mouth daily   triamcinolone (KENALOG) 0.1 % cream Apply topically 2 times daily Apply to rash   Hypromellose (ARTIFICIAL TEARS OP) Apply 1 drop to eye as needed   acetaminophen 500 MG CAPS Take 500 mg by mouth as needed Take 500-1,000 mg by mouth every 6 hours if needed.   magnesium 250 MG  "tablet Take 2 tablets by mouth daily At night.    [DISCONTINUED] furosemide (LASIX) 20 MG tablet Take 1 tablet (20 mg) by mouth every other day     No current facility-administered medications on file prior to visit.      Allergies   Allergen Reactions     Fluconazole Hives and Itching     Azithromycin Itching     Benadryl [Diphenhydramine Hcl]      Insomnia      Cefpodoxime Itching     Cellcept Diarrhea     Ciprofloxacin Other (See Comments)     Insomnia, mood lability     Codeine      Psych disturbance     Diphenhydramine Other (See Comments)     Doxycycline      Lansoprazole Diarrhea     Levaquin [Levofloxacin] Other (See Comments)     Headache, hyperactivity     Lisinopril Cough     Methotrexate      Sores     Morphine Itching     Morphine Sulfate Itching     Mycophenolate Diarrhea     Pepcid Diarrhea     Prevacid Diarrhea     Ranitidine Diarrhea     Simvastatin Muscle Pain (Myalgia)     severe     Zantac Diarrhea     Cephalexin Rash     Fever and skin burning     Penicillin G Itching and Rash     Penicillins Rash     Tolectin [Nsaids] Rash     Tolmetin Rash     Tramadol Rash       A complete review of systems is negative except as noted above in the HPI.    EXAMINATION:  /79 (BP Location: Left arm, Patient Position: Chair, Cuff Size: Adult Regular)  Pulse 76  Ht 1.715 m (5' 7.5\")  Wt 85.7 kg (189 lb)  SpO2 100%  BMI 29.16 kg/m2  In general, the patient is a pleasant female in no apparent distress.    HEENT: NC/AT.  Anicteric sclerae.  Conjugate gaze.  Neck:  Supple.  JVP is not elevated.  Heart: RRR with normal s1 and s2.  She does not have an s3 or s4.  No murmur or rub is present.  Her extremities are warm.  She does not have peripheral edema.  Lungs:  Vesicular lung sounds.  Abdomen: Soft, not tender.  Neurologic:  Alert and fully oriented.  Steady gait.  Skin: No petechiae/ecchymoses.  Not jaundiced.    Labs:  LIPID RESULTS:  Lab Results   Component Value Date    CHOL 322 (H) 05/10/2017    HDL 48 " (L) 05/10/2017    LDL (calc) Cannot estimate LDL when triglyceride exceeds 400 mg/dL 05/10/2017    LDL (direct) 198 (H) 05/10/2017    TRIG 448 (H) 05/10/2017    CHOLHDLRATIO 2.9 09/18/2015    NHDL 274 (H) 05/10/2017       LIVER ENZYME RESULTS:  Lab Results   Component Value Date    AST 19 05/10/2017    ALT 56 (H) 05/10/2017       CBC RESULTS:  Lab Results   Component Value Date    WBC 9.2 05/10/2017    RBC 4.41 05/10/2017    HGB 12.5 05/10/2017    HCT 38.8 05/10/2017    MCV 88 05/10/2017    MCH 28.3 05/10/2017    MCHC 32.2 05/10/2017    RDW 17.8 (H) 05/10/2017     05/10/2017       BMP RESULTS:  Lab Results   Component Value Date     05/10/2017    POTASSIUM 4.2 05/10/2017    CHLORIDE 102 05/10/2017    CO2 26 05/10/2017    ANIONGAP 10 05/10/2017     (H) 05/10/2017    BUN 36 (H) 05/10/2017    CR 1.53 (H) 05/10/2017    GFRESTIMATED 34 (L) 05/10/2017    GFRESTBLACK 41 (L) 05/10/2017    KEILY 9.7 05/10/2017        A1C RESULTS:  No results found for: A1C    INR RESULTS:  Lab Results   Component Value Date    INR 1.02 05/16/2016    INR 1.11 05/13/2016       Procedures:  Echocardiogram 7/17/17:  Global and regional left ventricular function is normal with an EF of 60-65%.  Right ventricular function, chamber size, wall motion, and thickness are normal.  Pulmonary artery systolic pressure is normal.  The inferior vena cava is normal.  No pericardial effusion is present.        Assessment and Plan:  Ms Thompson is a 68 yo female referred for further management of hyperlipidemia, hypertriglyceridemia, and hypertension by Dr. Gilmore with whom she follows for atrial fibrillation.  She underwent liver transplantation in 2002 and is on immunosuppression.    We discussed the results with patient:  We discussed the importance of a heart healthy lifestyle and diet.    1. Hyperlipidemia & hypertriglyceridemia.  She developed severe myalgias with simvastatin.  We discussed the antihyperlipidemic activity of statins as  well as their reduction of MI and stroke risk.  After discussing their risks and benefits she chose to try pravastatin.    Start pravastatin 5 mg daily and check fasting lipid panel and LFTs in 2-3 months.  She is to stop taking it and call our clinic if she develops muscle pain and/or weakness.  2. Hypertension with seemingly good control based on recently recorded BPs, although she previously had messaged on MyChart that she was having higher BPs.  She is under some considerable stress because of her mother's illness, which she thinks may be contributing to higher BPs recently.    Ambulatory BP monitoring.  Currently her antihypertensives include only metoprolol XL 25 mg daily and prn hydralazine for SBP >140 mmHg.      We gave patient following written instruction:    Change your Lasix to 10 mg by mouth once daily every morning.   Start Pravastatin 5 mg (one-half tablet) by mouth once daily.  Obtain fasting labs in late August or September.   STOP immediately if you develop muscle pain or weakness.      Schedule 24 hour blood pressure monitor.       Return to clinic in 1 year.  Fasting labs will be needed prior to this appointment.     Follow up in 1 year with fasting labs beforehand.  I discussed the patient with Dr. Randell Reyna.    Camilo Chand MD  Cardiovascular disease fellow    CC  Patient Care Team:  Jennifer Barraza MD as PCP - General (Family Practice)  Gentry Ramirez MD as MD (Gastroenterology)  Eden Clinton MD as MD (Pulmonary)  Oneil Jorgensen MD as MD (Ophthalmology)  Elenita Webster MD as MD (Dermatology)  Crystal Montero MD as MD (Infectious Diseases)  Graham Gilmore MD as MD (Clinical Cardiac Electrophysiology)  GRAHAM GILMORE

## 2017-06-29 NOTE — PROGRESS NOTES
CARDIOLOGY CLINIC NEW VISIT    HPI:  Ms Thompson is a 66 yo female with hyperlipidemia who is referred for further management of the same by Dr. Graham Gilmore, whom she sees for paroxysmal atrial fibrillation.  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  She has elevated LDL (198), total cholesterol (322), and triglyceride (448) levels.  When on simvastatin she developed severe myalgias although her CK was not elevated.  She has been keeping a log of her BPs, which typically run 120s-130s/70s but occasionally are in the 140s/80s.  She has previously sent [employer] messages about having BPs as high as the 150s-160s/90s.    She denies experiencing any chest pain, dyspnea, lightheadedness, syncope, or worsening peripheral edema or abdominal bloating.      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     Diagnostic skin and sensitization tests NOT ALLERGIC TO CORN/MILK PER IGE TESTS    10/25/12 IgE tests for corn/milk NEGATIVE     Diverticulosis of sigmoid colon 12/21/2013     GERD (gastroesophageal reflux disease)      H/O esophageal varices      H/O liver transplant (H) 2002    due to primary biliary cirrhosis     Heart murmur 4/4/2012     Hyperlipidemia 4/10/2012    Says that she does not have it anymore, not on meds     Hypocalcemia      MEDIAL MENISCUS TEAR - right 8/2/2012     Migraines 4/4/2012     Osteoarthritis of right knee 8/2/2012     Osteoporosis 4/20/2012     Papillary thyroid carcinoma (H)      Post-surgical hypothyroidism      Primary biliary cirrhosis (H)     s/p Liver transplant, [id number]     Sjogren's syndrome (H)      Thyroid cancer (H)      Unspecified cerebral artery occlusion with  "cerebral infarction     when BP was very low, small multiple infacts in frontal lobe, had \"visual field cut,\" leg weakness, and expressive aphasia - all have resolved.      Unspecified nonsenile cataract      Vitamin D deficiency 10/1/2012     VRE (vancomycin-resistant Enterococci)      Past Surgical History:   Procedure Laterality Date     APPENDECTOMY       BIOPSY       CATARACT IOL, RT/LT      RE2013, LE12/10/2013 - Toric lenses     CHOLECYSTECTOMY       COLONOSCOPY       COLONOSCOPY  3/10/2014    Procedure: COLONOSCOPY;;  Surgeon: Gentry Ramirez MD;  Location:  GI     ENDOSCOPIC RETROGRADE CHOLANGIOPANCREATOGRAM  2013    Procedure: ENDOSCOPIC RETROGRADE CHOLANGIOPANCREATOGRAM;  Endoscopic Retrograde Cholangiopancreatogram with single balloon enteroscopy, ballon sweep of bile duct;  Surgeon: Brett Membreno MD;  Location:  OR     ENT SURGERY      ear drum repair     HC KNEE SCOPE,MED/LAT MENISECTOMY  8/10/12    Right, partial medial menisectomy only     KNEE SURGERY  1966    R knee     PICC INSERTION  2013    4fr SL PASV PICC, 40cm (1cm external) in the R basilic vein w/ tip in the low SVC     PICC INSERTION  2014    5 fr DL BioFlo Navilyst PICC, 46 cm (3 cm external) in the L basilic vein w/ tip in the SVC RA junction.     THYROIDECTOMY  3/2010     TRANSPLANT LIVER RECIPIENT LIVING UNRELATED       Family History   Problem Relation Age of Onset     Hypertension Mother      Lipids Mother      Prostate Cancer Father      Macular Degeneration Father      Cancer - colorectal Maternal Grandmother      in her 80's, has surgery and removal of part of kidney,  at age 98     Colon Cancer Maternal Grandmother      Eye Disorder Maternal Grandfather      Glaucoma     HEART DISEASE Maternal Grandfather       at 98     Glaucoma Maternal Grandfather      CEREBROVASCULAR DISEASE Paternal Grandmother      in her 80's     Hypertension Paternal Grandmother      HEART DISEASE " "Paternal Grandfather      MI     Allergies Son      Neurologic Disorder Daughter      Migraines     Anesthesia Reaction No family hx of      Crohn Disease No family hx of      Ulcerative Colitis No family hx of      Social History     Social History     Marital status:      Spouse name: Robbin Thompson     Number of children: 4     Years of education: 20     Occupational History     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 She and her  travel around the [place] in an RV. [place] in [place] the course of the winter. 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       Current Outpatient Prescriptions on File Prior to Visit:  ASPIRIN NOT PRESCRIBED (INTENTIONAL) Please choose reason not prescribed, below   STATIN NOT PRESCRIBED, INTENTIONAL, Please choose reason not prescribed, below   hydrALAZINE (APRESOLINE) 25 MG tablet Take 0.5 tablets (12.5 mg) by mouth 3 times daily as needed , if systolic blood pressure is more than 140 mmHg.   levothyroxine (SYNTHROID/LEVOTHROID) 150 MCG tablet Take one tablet daily 6 days a week and one and a half tablets one day a week.   calcitRIOL (ROCALTROL) 0.5 MCG capsule Take 1 capsule (0.5 mcg) by mouth daily   voriconazole (VFEND) 200 MG tablet Take 1 tablet (200 mg) by mouth 2 times daily   apixaban ANTICOAGULANT (ELIQUIS) 5 MG " tablet Take 1 tablet (5 mg) by mouth 2 times daily   metoprolol (TOPROL-XL) 25 MG 24 hr tablet Take 1 tablet (25 mg) by mouth daily   predniSONE (DELTASONE) 5 MG tablet Take 1 tablet (5 mg) by mouth daily   RAPAMUNE 0.5 MG PO TABLET Take 0.5 mg by mouth every other day   fluticasone-vilanterol (BREO ELLIPTA) 100-25 MCG/INH oral inhaler Inhale 1 puff into the lungs daily Reported on 4/25/2017   albuterol (ALBUTEROL) 108 (90 BASE) MCG/ACT Inhaler Inhale 2 puffs into the lungs every 4 hours as needed for shortness of breath / dyspnea or wheezing Reported on 4/25/2017   clotrimazole (LOTRIMIN) 1 % cream Apply topically 2 times daily Reported on 4/25/2017   ursodiol (ACTIGALL) 250 MG tablet Take 1 tablet (250 mg) by mouth 2 times daily   Wheat Dextrin (BENEFIBER) POWD 2 teaspoons daily   SUMAtriptan (IMITREX) 50 MG tablet Take 1 tablet (50 mg) by mouth at onset of headache for migraine repeat after 2 hours if needed.   meclizine (ANTIVERT) 25 MG tablet Take 1 tablet (25 mg) by mouth as needed   folic acid (FOLVITE) 1 MG tablet Take 1 tablet (1 mg) by mouth daily   STATIN NOT PRESCRIBED, INTENTIONAL, Statin not prescribed intentionally due to Intolerance (with supporting documentation of trying a statin at least once within the last 5 years)   Multiple Vitamins-Minerals (PRESERVISION AREDS 2) CAPS Take 2 capsules by mouth daily   triamcinolone (KENALOG) 0.1 % cream Apply topically 2 times daily Apply to rash   Hypromellose (ARTIFICIAL TEARS OP) Apply 1 drop to eye as needed   acetaminophen 500 MG CAPS Take 500 mg by mouth as needed Take 500-1,000 mg by mouth every 6 hours if needed.   magnesium 250 MG tablet Take 2 tablets by mouth daily At night.    [DISCONTINUED] furosemide (LASIX) 20 MG tablet Take 1 tablet (20 mg) by mouth every other day     No current facility-administered medications on file prior to visit.      Allergies   Allergen Reactions     Fluconazole Hives and Itching     Azithromycin Itching     Benadryl  "[Diphenhydramine Hcl]      Insomnia      Cefpodoxime Itching     Cellcept Diarrhea     Ciprofloxacin Other (See Comments)     Insomnia, mood lability     Codeine      Psych disturbance     Diphenhydramine Other (See Comments)     Doxycycline      Lansoprazole Diarrhea     Levaquin [Levofloxacin] Other (See Comments)     Headache, hyperactivity     Lisinopril Cough     Methotrexate      Sores     Morphine Itching     Morphine Sulfate Itching     Mycophenolate Diarrhea     Pepcid Diarrhea     Prevacid Diarrhea     Ranitidine Diarrhea     Simvastatin Muscle Pain (Myalgia)     severe     Zantac Diarrhea     Cephalexin Rash     Fever and skin burning     Penicillin G Itching and Rash     Penicillins Rash     Tolectin [Nsaids] Rash     Tolmetin Rash     Tramadol Rash       A complete review of systems is negative except as noted above in the HPI.    EXAMINATION:  /79 (BP Location: Left arm, Patient Position: Chair, Cuff Size: Adult Regular)  Pulse 76  Ht 1.715 m (5' 7.5\")  Wt 85.7 kg (189 lb)  SpO2 100%  BMI 29.16 kg/m2  In general, the patient is a pleasant female in no apparent distress.    HEENT: NC/AT.  Anicteric sclerae.  Conjugate gaze.  Neck:  Supple.  JVP is not elevated.  Heart: RRR with normal s1 and s2.  She does not have an s3 or s4.  No murmur or rub is present.  Her extremities are warm.  She does not have peripheral edema.  Lungs:  Vesicular lung sounds.  Abdomen: Soft, not tender.  Neurologic:  Alert and fully oriented.  Steady gait.  Skin: No petechiae/ecchymoses.  Not jaundiced.    Labs:  LIPID RESULTS:  Lab Results   Component Value Date    CHOL 322 (H) 05/10/2017    HDL 48 (L) 05/10/2017    LDL (calc) Cannot estimate LDL when triglyceride exceeds 400 mg/dL 05/10/2017    LDL (direct) 198 (H) 05/10/2017    TRIG 448 (H) 05/10/2017    CHOLHDLRATIO 2.9 09/18/2015    NHDL 274 (H) 05/10/2017       LIVER ENZYME RESULTS:  Lab Results   Component Value Date    AST 19 05/10/2017    ALT 56 (H) " 05/10/2017       CBC RESULTS:  Lab Results   Component Value Date    WBC 9.2 05/10/2017    RBC 4.41 05/10/2017    HGB 12.5 05/10/2017    HCT 38.8 05/10/2017    MCV 88 05/10/2017    MCH 28.3 05/10/2017    MCHC 32.2 05/10/2017    RDW 17.8 (H) 05/10/2017     05/10/2017       BMP RESULTS:  Lab Results   Component Value Date     05/10/2017    POTASSIUM 4.2 05/10/2017    CHLORIDE 102 05/10/2017    CO2 26 05/10/2017    ANIONGAP 10 05/10/2017     (H) 05/10/2017    BUN 36 (H) 05/10/2017    CR 1.53 (H) 05/10/2017    GFRESTIMATED 34 (L) 05/10/2017    GFRESTBLACK 41 (L) 05/10/2017    KEILY 9.7 05/10/2017        A1C RESULTS:  No results found for: A1C    INR RESULTS:  Lab Results   Component Value Date    INR 1.02 05/16/2016    INR 1.11 05/13/2016       Procedures:  Echocardiogram 7/17/17:  Global and regional left ventricular function is normal with an EF of 60-65%.  Right ventricular function, chamber size, wall motion, and thickness are normal.  Pulmonary artery systolic pressure is normal.  The inferior vena cava is normal.  No pericardial effusion is present.        Assessment and Plan:  Ms Thompson is a 68 yo female referred for further management of hyperlipidemia, hypertriglyceridemia, and hypertension by Dr. Gilmore with whom she follows for atrial fibrillation.  She underwent liver transplantation in 2002 and is on immunosuppression.    We discussed the results with patient:  We discussed the importance of a heart healthy lifestyle and diet.    1. Hyperlipidemia & hypertriglyceridemia.  She developed severe myalgias with simvastatin.  We discussed the antihyperlipidemic activity of statins as well as their reduction of MI and stroke risk.  After discussing their risks and benefits she chose to try pravastatin.    Start pravastatin 5 mg daily and check fasting lipid panel and LFTs in 2-3 months.  She is to stop taking it and call our clinic if she develops muscle pain and/or weakness.  2. Hypertension  with seemingly good control based on recently recorded BPs, although she previously had messaged on MyChart that she was having higher BPs.  She is under some considerable stress because of her mother's illness, which she thinks may be contributing to higher BPs recently.    Ambulatory BP monitoring.  Currently her antihypertensives include only metoprolol XL 25 mg daily and prn hydralazine for SBP >140 mmHg.      We gave patient following written instruction:    Change your Lasix to 10 mg by mouth once daily every morning.   Start Pravastatin 5 mg (one-half tablet) by mouth once daily.  Obtain fasting labs in late August or September.   STOP immediately if you develop muscle pain or weakness.      Schedule 24 hour blood pressure monitor.       Return to clinic in 1 year.  Fasting labs will be needed prior to this appointment.     Follow up in 1 year with fasting labs beforehand.  I discussed the patient with Dr. Randell Reyna.    Camilo Chand MD  Cardiovascular disease fellow    I interviewed and examined the patient with the CV fellow.  I agree with the assessment and plan as documented.    Randell Reyna MD, PhD  Professor of Medicine  Division of Cardiology    CC  Patient Care Team:  Jennifer Barraza MD as PCP - General (Family Practice)  Gentry Ramirez MD as MD (Gastroenterology)  Eden Clinton MD as MD (Pulmonary)  Oneil Jorgensen MD as MD (Ophthalmology)  Elenita Webster MD as MD (Dermatology)  Crystal Montero MD as MD (Infectious Diseases)  Graham Gilmore MD as MD (Clinical Cardiac Electrophysiology)  GRAHAM GILMORE

## 2017-06-29 NOTE — NURSING NOTE
Chief Complaint   Patient presents with     New Patient     EKG     Vitals were taken and medications were reconciled. EKG was performed.    NORMA Cates  1:49 PM

## 2017-07-06 ENCOUNTER — MYC MEDICAL ADVICE (OUTPATIENT)
Dept: CARDIOLOGY | Facility: CLINIC | Age: 67
End: 2017-07-06

## 2017-07-06 DIAGNOSIS — E78.5 HYPERLIPIDEMIA, UNSPECIFIED HYPERLIPIDEMIA TYPE: Primary | ICD-10-CM

## 2017-07-11 DIAGNOSIS — H35.3130 BILATERAL DRY AGE-RELATED MACULAR DEGENERATION: Primary | ICD-10-CM

## 2017-07-11 DIAGNOSIS — H40.003 GLAUCOMA SUSPECT, BILATERAL: Primary | ICD-10-CM

## 2017-07-11 DIAGNOSIS — Z94.4 LIVER REPLACED BY TRANSPLANT (H): Primary | ICD-10-CM

## 2017-07-12 ENCOUNTER — OFFICE VISIT (OUTPATIENT)
Dept: OPHTHALMOLOGY | Facility: CLINIC | Age: 67
End: 2017-07-12
Attending: OPHTHALMOLOGY
Payer: MEDICARE

## 2017-07-12 DIAGNOSIS — H40.003 GLAUCOMA SUSPECT, BILATERAL: ICD-10-CM

## 2017-07-12 DIAGNOSIS — H35.3130 BILATERAL DRY AGE-RELATED MACULAR DEGENERATION: ICD-10-CM

## 2017-07-12 DIAGNOSIS — H40.003 GLAUCOMA SUSPECT, BILATERAL: Primary | ICD-10-CM

## 2017-07-12 PROCEDURE — 99213 OFFICE O/P EST LOW 20 MIN: CPT | Mod: ZF

## 2017-07-12 PROCEDURE — 92133 CPTRZD OPH DX IMG PST SGM ON: CPT | Mod: ZF | Performed by: OPHTHALMOLOGY

## 2017-07-12 PROCEDURE — 92083 EXTENDED VISUAL FIELD XM: CPT | Mod: ZF | Performed by: OPHTHALMOLOGY

## 2017-07-12 PROCEDURE — 92250 FUNDUS PHOTOGRAPHY W/I&R: CPT | Mod: ZF | Performed by: OPHTHALMOLOGY

## 2017-07-12 PROCEDURE — 92134 CPTRZ OPH DX IMG PST SGM RTA: CPT | Mod: ZF | Performed by: OPHTHALMOLOGY

## 2017-07-12 RX ORDER — EZETIMIBE 10 MG/1
10 TABLET ORAL DAILY
Qty: 90 TABLET | Refills: 3 | Status: ON HOLD | OUTPATIENT
Start: 2017-07-12 | End: 2017-09-08

## 2017-07-12 RX ORDER — EZETIMIBE 10 MG/1
10 TABLET ORAL DAILY
Qty: 90 TABLET | Refills: 3 | Status: SHIPPED | OUTPATIENT
Start: 2017-07-12 | End: 2017-07-12

## 2017-07-12 ASSESSMENT — TONOMETRY
IOP_METHOD: APPLANATION
IOP_METHOD: APPLANATION
OS_IOP_MMHG: 13
OS_IOP_MMHG: 13
OD_IOP_MMHG: 13
OD_IOP_MMHG: 13

## 2017-07-12 ASSESSMENT — CONF VISUAL FIELD
OS_NORMAL: 1
OD_NORMAL: 1
OD_NORMAL: 1
OS_NORMAL: 1
METHOD: COUNTING FINGERS

## 2017-07-12 ASSESSMENT — EXTERNAL EXAM - RIGHT EYE
OD_EXAM: NORMAL
OD_EXAM: NORMAL

## 2017-07-12 ASSESSMENT — CUP TO DISC RATIO
OS_RATIO: 0.5
OD_RATIO: 0.5
OD_RATIO: 0.5
OS_RATIO: 0.5

## 2017-07-12 ASSESSMENT — VISUAL ACUITY
OD_SC: 20/25
OS_SC: 20/20
OD_SC: 20/25
OS_SC: 20/20-1
OS_SC+: -1
METHOD: SNELLEN - LINEAR
METHOD: SNELLEN - LINEAR

## 2017-07-12 ASSESSMENT — EXTERNAL EXAM - LEFT EYE
OS_EXAM: NORMAL
OS_EXAM: NORMAL

## 2017-07-12 NOTE — MR AVS SNAPSHOT
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      Your next 10 appointments already scheduled     Jul 14, 2017 10:00 AM CDT   [facility]   [facility]    [address] [place] [address] [phone]           Patient must bring picture ID.  Patient should be prepared to give a urine specimen  Please do not eat 10-12 hours before your appointment if you are coming in fasting for labs on lipids, cholesterol, or glucose (sugar).  Pregnant women should follow their Care Team instructions. Water with medications is okay. Do not drink coffee or other fluids.   If you have concerns about taking  your medications, please ask at office or if scheduling via [employer] Old [employer], send a message by clicking on Secure Messaging, Message Your Care Team.            Jul 31, 2017  9:20 AM CDT   (Arrive by 9:05 AM)   CT CHEST W/O CONTRAST with UCCT1   [facility] ([facility])    [address] [address] [phone]           Please bring any scans or X-rays taken at other hospitals, if similar tests were done. Also bring a list of your medicines, including vitamins, minerals and over-the-counter drugs. It is safest to leave personal items at home.  Be sure to tell your doctor:   If you have any allergies.   If there s any chance you are pregnant.   If you are breastfeeding.   If you have any special needs.  You do not need to do  anything special to prepare.  Please wear loose clothing, such as a sweat suit or jogging clothes. Avoid snaps, zippers and other metal. We may ask you to undress and put on a hospital gown.            Jul 31, 2017 10:30 AM CDT   (Arrive by 10:15 AM)   Return Visit with Eden Clinton MD   [facility] ([facility] [place])    [address] [facility]  [address]rd [facility] [address]   [phone]            Jul 31, 2017  1:00 PM CDT   (Arrive by 12:45 PM)   PAC Pharmacist with  Pac Pharmacist   [facility] [place] ([facility])    [address] [facility]  [address] [facility] [address]   [phone]            Jul 31, 2017  1:30 PM CDT   (Arrive by 1:15 PM)   PAC EVALUATION with  Pac Deidre 3   [facility] [place] ([facility])    [address] [place] [address]  [address] [facility] [address]   [phone]            Jul 31, 2017  2:30 PM CDT   (Arrive by 2:15 PM)   PAC RN ASSESSMENT with  Pac Rn   [facility] [place] ([facility])    [address] [place] [address]  [address] [facility] [address]   [phone]            Jul 31, 2017  3:10 PM CDT   (Arrive by 2:55 PM)   PAC Anesthesia Consult with  Pac Anesthesiologist   [facility] [place] ([facility])    [address] [place] [address]  [address] [facility] [address]   [phone]            Aug 14, 2017  1:00 PM CDT   (Arrive by 12:45 PM)   ATRIAL FIBULATION VISIT with Graham Gilmore MD   [facility] ([facility] [place])    [address] [facility]  [address] [facility] [address]   [phone]            Aug 16, 2017  9:30 AM CDT   (Arrive by 9:15 AM)   Return Visit with Crystal Montero MD   [facility] ([facility])    [address] [place] [address]  Se  3rd Floor  [facility] [id number] [phone]            Aug 17, 2017   Procedure with Eneida Montano MD   [facility] [place] ([facility])    [address]  [facility] [id number] [phone]           The [facility] is located on the corner of [address] and [facility] on the [facility]. It is easily accessible from virtually any point in the [facility] [place] area, via [address] and [address].              Future tests that were ordered for you today     Open Standing Orders        Priority Remaining Interval Expires Ordered    CBC with platelets Routine 6/6 Every 2 Months 7/11/2018 7/11/2017    Basic metabolic panel Routine 6/6 Every 2 Months 7/11/2018 7/11/2017    Hepatic panel Routine 6/6 Every 2 Months 7/11/2018 7/11/2017    Sirolimus level Routine 6/6 Every 2 Months 7/11/2018 7/11/2017    Magnesium Routine 6/6 Every 2 Months 7/11/2018 7/11/2017            Who to contact     Please call your clinic at [phone] to:    Ask questions about your health    Make or cancel appointments    Discuss your medicines    Learn about your test results    Speak to your doctor   If you have compliments or concerns about an experience at your clinic, or if you wish to file a complaint, please contact [facility] Patient Relations at [phone] or email us at [email or web address]         Additional Information About Your Visit        FunnelFirehart Information     21[employer]ett gives you secure access to your electronic health record. If you see a primary care provider, you can also send messages to your care team and make appointments. If you have questions, please call your primary care clinic.  If you do not have a primary care provider, please call [phone] and they will assist you.      [employer] is an electronic gateway that provides easy, online access to your medical records. With  Haja, you can request a clinic appointment, read your test results, renew a prescription or communicate with your care team.     To access your existing account, please contact your [facility] or call [phone] for assistance.        Care EveryWhere ID     This is your Care EveryWhere ID. This could be used by other organizations to access your [facility] medical records  [id number]         Blood Pressure from Last 3 Encounters:   06/29/17 149/79   06/13/17 124/80   05/24/17 178/65    Weight from Last 3 Encounters:   06/29/17 85.7 kg (189 lb)   06/13/17 84.4 kg (186 lb)   05/24/17 85.9 kg (189 lb 4.8 oz)              We Performed the Following     Fundus Autofluorescence Image (FAF) OU (both eyes)     OCT Optic Nerve RNFL Spectralis OU (both eyes)     OCT Retina Spectralis OU (both eyes)     OVF 24-2 Dynamic OU        Primary Care Provider Office Phone # Fax #    Jennifer Amparo Barraza [phone]       [address] [address]        Equal Access to Services     CARLA [facility]FR[facility]O : Hadii aad ku hadasho Soomaali, waaxda luqadaha, qaybta kaalmada adeegyada, etta lu . So [facility] [phone].    ATENCIÓN: Si habla español, tiene a jason disposición servicios gratuitos de asistencia lingüística. Llame al [phone].    We comply with applicable federal civil rights laws and [place] laws. We do not discriminate on the basis of race, color, national origin, age, disability sex, sexual orientation or gender identity.            Thank you!     Thank you for choosing [facility]  for your care. Our goal is always to provide you with excellent care. Hearing back from our patients is one way we can continue to improve our services. Please take a few minutes to complete the written survey that you may receive in the mail after your visit with us. Thank you!             Your Updated Medication List - Protect  others around you: Learn how to safely use, store and throw away your medicines at www.disposemymeds.org.          This list is accurate as of: 7/12/17 11:21 AM.  Always use your most recent med list.                   Brand Name Dispense Instructions for use Diagnosis    acetaminophen 500 MG Caps      Take 500 mg by mouth as needed Take 500-1,000 mg by mouth every 6 hours if needed.        albuterol 108 (90 BASE) MCG/ACT Inhaler   Generic drug:  albuterol      Inhale 2 puffs into the lungs every 4 hours as needed for shortness of breath / dyspnea or wheezing Reported on 4/25/2017        apixaban ANTICOAGULANT 5 MG tablet    ELIQUIS    60 tablet    Take 1 tablet (5 mg) by mouth 2 times daily    Paroxysmal atrial fibrillation (H)       ARTIFICIAL TEARS OP      Apply 1 drop to eye as needed        ASPIRIN NOT PRESCRIBED    INTENTIONAL    0 each    Please choose reason not prescribed, below    Cerebral artery occlusion with cerebral infarction (H)       BENEFIBER Powd      2 teaspoons daily        BREO ELLIPTA 100-25 MCG/INH oral inhaler   Generic drug:  fluticasone-vilanterol      Inhale 1 puff into the lungs daily Reported on 4/25/2017        calcitRIOL 0.5 MCG capsule    ROCALTROL    90 capsule    Take 1 capsule (0.5 mcg) by mouth daily        clotrimazole 1 % cream    LOTRIMIN     Apply topically 2 times daily Reported on 4/25/2017        folic acid 1 MG tablet    FOLVITE    90 tablet    Take 1 tablet (1 mg) by mouth daily    High risk medication use       furosemide 20 MG tablet    LASIX    46 tablet    Take 0.5 tablets (10 mg) by mouth daily    CKD (chronic kidney disease) stage 3, GFR 30-59 ml/min, Liver replaced by transplant (H)       hydrALAZINE 25 MG tablet    APRESOLINE    270 tablet    Take 0.5 tablets (12.5 mg) by mouth 3 times daily as needed , if systolic blood pressure is more than 140 mmHg.    HTN (hypertension)       levothyroxine 150 MCG tablet    SYNTHROID/LEVOTHROID    96 tablet    Take one tablet  daily 6 days a week and one and a half tablets one day a week.    Postablative hypothyroidism       magnesium 250 MG tablet      Take 2 tablets by mouth daily At night.        meclizine 25 MG tablet    ANTIVERT    30 tablet    Take 1 tablet (25 mg) by mouth as needed    Liver replaced by transplant (H)       metoprolol 25 MG 24 hr tablet    TOPROL-XL    30 tablet    Take 1 tablet (25 mg) by mouth daily    Paroxysmal atrial fibrillation (H)       pravastatin 10 MG tablet    PRAVACHOL    90 tablet    Take 1 tablet (10 mg) by mouth daily , or as instructed by Dr. Reyna.    Hyperlipidemia with target LDL less than 100       predniSONE 5 MG tablet    DELTASONE    90 tablet    Take 1 tablet (5 mg) by mouth daily    Thyroid cancer (H), Liver replaced by transplant (H)       PRESERVISION AREDS 2 Caps      Take 2 capsules by mouth daily    Dry eyes, bilateral       sirolimus Tabs tablet     15 tablet    Take 0.5 mg by mouth every other day    Liver replaced by transplant (H)       STATIN NOT PRESCRIBED (INTENTIONAL)     0 each    Statin not prescribed intentionally due to Intolerance (with supporting documentation of trying a statin at least once within the last 5 years)    Statin intolerance       STATIN NOT PRESCRIBED (INTENTIONAL)      Please choose reason not prescribed, below    Statin intolerance       SUMAtriptan 50 MG tablet    IMITREX    30 tablet    Take 1 tablet (50 mg) by mouth at onset of headache for migraine repeat after 2 hours if needed.    Thyroid cancer (H)       triamcinolone 0.1 % cream    KENALOG    80 g    Apply topically 2 times daily Apply to rash    Contact dermatitis and other eczema, due to unspecified cause       ursodiol 250 MG tablet    ACTIGALL    180 tablet    Take 1 tablet (250 mg) by mouth 2 times daily    Liver replaced by transplant (H)       voriconazole 200 MG tablet    VFEND    60 tablet    Take 1 tablet (200 mg) by mouth 2 times daily    Coccidioidal pneumonitis (H)

## 2017-07-12 NOTE — NURSING NOTE
Chief Complaints and History of Present Illnesses   Patient presents with     Follow Up For     ARMD OU     HPI    Affected eye(s):  Both   Symptoms:     No blurred vision   No decreased vision         Do you have eye pain now?:  No      Comments:  Burning and itching a lot a a few weeks ago OU. Much better. Attributes this to allergies  Heydi ROWAN 9:42 AM July 12, 2017

## 2017-07-12 NOTE — PROGRESS NOTES
CC: follow up nonexudative AMD    HPI: Luz Thompson is a  66 year old year-old patient with history of nonexudative AMD both eyes presenting for follow up.  Has noticed mild decrease in vision both eyes.     RETINAL IMAGING  Optical Coherence Tomography 7.12.17  Right eye - RPE changes nasal to fovea, drusen, trace Epiretinal membrane   Left eye- RPE changes nasal to fovea, drusen, trace Epiretinal membrane     Autofluorescence 7.12.17  Right eye-hyperautofluorescent spots indicating drusen and RPE changes   Left eye--hyperautofluorescent spots indicating drusen and RPE changes      Assessment & Plan:  1. Nonexudative senile macular degeneration of retina  - Bilateral drusen  - On AREDS (usure of formulation) - recommended AREDS2 given smoking history (stopped 1985)  - Continue Amsler    2. ERM OD  - Mild, not likely visually significant   - Observe    3. Pseudophakia of both eyes  - PCO both eyes right >left  - s/p Yag capsulotomy with Dr Jorgensen last year    4. Posterior vitreous detachment, bilateral  - Retinal detachment precautions given (need to return for immediate exam for increasing flashes or light, floaters, worsening vision, or the appearance of a shadow or curtain in the vision)    5. Glaucoma suspect  (+) grandfather with glaucoma   Plan for glaucoma evaluation   Follow with Dr. Tovar     Return in 12 months or sooner as needed  Optical Coherence Tomography and Autofluorescence         ~~~~~~~~~~~~~~~~~~~~~~~~~~~~~~~~~~   Complete documentation of historical and exam elements from today's encounter can be found in the full encounter summary report (not reduplicated in this progress note).  I personally obtained the chief complaint(s) and history of present illness.  I confirmed and edited as necessary the review of systems, past medical/surgical history, family history, social history, and examination findings as documented by others; and I examined the patient myself.  I personally reviewed the  relevant tests, images, and reports as documented above.  I formulated and edited as necessary the assessment and plan and discussed the findings and management plan with the patient and family    Meri Frost MD  .  Retina Service   Department of Ophthalmology and Visual Neurosciences   [facility]  Phone: [phone]   Fax: [phone]

## 2017-07-12 NOTE — MR AVS SNAPSHOT
After Visit Summary   7/12/2017    Luz Thompson    MRN: [id number]           Patient Information     Date Of Birth          1950        Visit Information        Provider Department      7/12/2017 10:30 AM D[facility]        Today's Diagnoses     Glaucoma suspect, bilateral    -  1       Follow-ups after your visit        Follow-up notes from your care team     Return in about 1 year (around 7/12/2018) for applanation, OVF 24-2, dilation, nerve OCT OU.      Your next 10 appointments already scheduled     Jul 14, 2017 10:00 AM CDT   LAB with RV LAB   [facility]    [address] [place] [id number] [phone]           Patient must bring picture ID.  Patient should be prepared to give a urine specimen  Please do not eat 10-12 hours before your appointment if you are coming in fasting for labs on lipids, cholesterol, or glucose (sugar).  Pregnant women should follow their Care Team instructions. Water with medications is okay. Do not drink coffee or other fluids.   If you have concerns about taking  your medications, please ask at office or if scheduling via [facility], send a message by clicking on Secure Messaging, Message Your Care Team.            Jul 31, 2017  9:20 AM CDT   (Arrive by 9:05 AM)   CT CHEST W/O CONTRAST with UCCT1   [facility] ([facility])    [address] [address] [phone]           Please bring any scans or X-rays taken at other hospitals, if similar tests were done. Also bring a list of your medicines, including vitamins, minerals and over-the-counter drugs. It is safest to leave personal items at home.  Be sure to tell your doctor:   If you have any allergies.   If there s any chance you are pregnant.   If you are breastfeeding.   If you have any special needs.  You do not need to do anything special to  prepare.  Please wear loose clothing, such as a sweat suit or jogging clothes. Avoid snaps, zippers and other metal. We may ask you to undress and put on a hospital gown.            Jul 31, 2017 10:30 AM CDT   (Arrive by 10:15 AM)   Return Visit with Eden Clinton MD   [facility] ([facility])    [address]  [address] [facility] [place] [address]   [phone]            Jul 31, 2017  1:00 PM CDT   (Arrive by 12:45 PM)   PAC Pharmacist with  Pac Pharmacist   [facility] ([facility])    [address]  [address] [facility] [place] [address]   [phone]            Jul 31, 2017  1:30 PM CDT   (Arrive by 1:15 PM)   PAC EVALUATION with  Pac Deidre 3   [facility] ([facility])    [address]  [address] [facility] [place] [address]   [phone]            Jul 31, 2017  2:30 PM CDT   (Arrive by 2:15 PM)   PAC RN ASSESSMENT with  Pac Rn   [facility] ([facility])    [address]  [address] [facility] [place] [address]   [phone]            Jul 31, 2017  3:10 PM CDT   (Arrive by 2:55 PM)   PAC Anesthesia Consult with  Pac Anesthesiologist   [facility] ([facility])    [address] [place] [address]  [address] [facility] [place] [address]   [phone]            Aug 14, 2017  1:00 PM CDT   (Arrive by 12:45 PM)   ATRIAL FIBULATION VISIT with Graham Gilmore MD   [facility] ([facility])    [address]  [address] [facility] [place] [address]   [phone]            Aug 16, 2017  9:30 AM CDT   (Arrive by 9:15 AM)   Return Visit with Crystal Montero MD   [facility] ([facility])    [address] [place] [address]  Floor  [facility] [address] [phone]            Aug 17, 2017   Procedure with Eneida Montano MD   [facility] [place] ([facility])    [address]  [facility] [id number] [phone]           The [facility] is located on the corner of [facility] [address] [facility] and [facility] on the [facility]. It is easily accessible from virtually any point in the [place] area, via [address] and [address].              Future tests that were ordered for you today     Open Standing Orders        Priority Remaining Interval Expires Ordered    CBC with platelets Routine 6/6 Every 2 Months 7/11/2018 7/11/2017    Basic metabolic panel Routine 6/6 Every 2 Months 7/11/2018 7/11/2017    Hepatic panel Routine 6/6 Every 2 Months 7/11/2018 7/11/2017    Sirolimus level Routine 6/6 Every 2 Months 7/11/2018 7/11/2017    Magnesium Routine 6/6 Every 2 Months 7/11/2018 7/11/2017            Who to contact     Please call your clinic at [phone] to:    Ask questions about your health    Make or cancel appointments    Discuss your medicines    Learn about your test results    Speak to your doctor   If you have compliments or concerns about an experience at your clinic, or if you wish to file a complaint, please contact [facility] Patient Relations at [phone] or email us at [email or web address]         Additional Information About Your Visit        [employer] Information     "" gives you secure access to your electronic health record. If you see a primary care provider, you can also send messages to your care team and make appointments. If you have questions, please call your primary care clinic.  If you do not have a primary care provider, please call [phone] and they will assist you.      "" is an electronic gateway that provides easy, online access to your medical records. With "",  you can request a clinic appointment, read your test results, renew a prescription or communicate with your care team.     To access your existing account, please contact your [facility] or call [phone] for assistance.        Care EveryWhere ID     This is your Care EveryWhere ID. This could be used by other organizations to access your [place] medical records  [id number]         Blood Pressure from Last 3 Encounters:   06/29/17 149/79   06/13/17 124/80   05/24/17 178/65    Weight from Last 3 Encounters:   06/29/17 85.7 kg (189 lb)   06/13/17 84.4 kg (186 lb)   05/24/17 85.9 kg (189 lb 4.8 oz)              Today, you had the following     No orders found for display       Primary Care Provider Office Phone # Fax #    Jennifer Amparo Barraza [phone]       [facility] [address]  [facility] [address]        Equal Access to Services     GENE NOWAK : Hadii aad ku hadasho Soomaali, waaxda luqadaha, qaybta kaalmada adeegyada, waxay idiin hayaan adeeg kharash la'augustus . So [facility] [phone].    ATENCIÓN: Si habla español, tiene a jason disposición servicios gratuitos de asistencia lingüística. [facility] [phone].    We comply with applicable federal civil rights laws and [place] laws. We do not discriminate on the basis of race, color, national origin, age, disability sex, sexual orientation or gender identity.            Thank you!     Thank you for choosing [facility]  for your care. Our goal is always to provide you with excellent care. Hearing back from our patients is one way we can continue to improve our services. Please take a few minutes to complete the written survey that you may receive in the mail after your visit with us. Thank you!             Your Updated Medication List - Protect others around you: Learn how to safely use, store and throw away your medicines at www.disposemymeds.org.          This list is accurate as of: 7/12/17  11:57 AM.  Always use your most recent med list.                   Brand Name Dispense Instructions for use Diagnosis    acetaminophen 500 MG Caps      Take 500 mg by mouth as needed Take 500-1,000 mg by mouth every 6 hours if needed.        albuterol 108 (90 BASE) MCG/ACT Inhaler   Generic drug:  albuterol      Inhale 2 puffs into the lungs every 4 hours as needed for shortness of breath / dyspnea or wheezing Reported on 4/25/2017        apixaban ANTICOAGULANT 5 MG tablet    ELIQUIS    60 tablet    Take 1 tablet (5 mg) by mouth 2 times daily    Paroxysmal atrial fibrillation (H)       ARTIFICIAL TEARS OP      Apply 1 drop to eye as needed        ASPIRIN NOT PRESCRIBED    INTENTIONAL    0 each    Please choose reason not prescribed, below    Cerebral artery occlusion with cerebral infarction (H)       BENEFIBER Powd      2 teaspoons daily        BREO ELLIPTA 100-25 MCG/INH oral inhaler   Generic drug:  fluticasone-vilanterol      Inhale 1 puff into the lungs daily Reported on 4/25/2017        calcitRIOL 0.5 MCG capsule    ROCALTROL    90 capsule    Take 1 capsule (0.5 mcg) by mouth daily        clotrimazole 1 % cream    LOTRIMIN     Apply topically 2 times daily Reported on 4/25/2017        folic acid 1 MG tablet    FOLVITE    90 tablet    Take 1 tablet (1 mg) by mouth daily    High risk medication use       furosemide 20 MG tablet    LASIX    46 tablet    Take 0.5 tablets (10 mg) by mouth daily    CKD (chronic kidney disease) stage 3, GFR 30-59 ml/min, Liver replaced by transplant (H)       hydrALAZINE 25 MG tablet    APRESOLINE    270 tablet    Take 0.5 tablets (12.5 mg) by mouth 3 times daily as needed , if systolic blood pressure is more than 140 mmHg.    HTN (hypertension)       levothyroxine 150 MCG tablet    SYNTHROID/LEVOTHROID    96 tablet    Take one tablet daily 6 days a week and one and a half tablets one day a week.    Postablative hypothyroidism       magnesium 250 MG tablet      Take 2 tablets by  mouth daily At night.        meclizine 25 MG tablet    ANTIVERT    30 tablet    Take 1 tablet (25 mg) by mouth as needed    Liver replaced by transplant (H)       metoprolol 25 MG 24 hr tablet    TOPROL-XL    30 tablet    Take 1 tablet (25 mg) by mouth daily    Paroxysmal atrial fibrillation (H)       pravastatin 10 MG tablet    PRAVACHOL    90 tablet    Take 1 tablet (10 mg) by mouth daily , or as instructed by Dr. Reyna.    Hyperlipidemia with target LDL less than 100       predniSONE 5 MG tablet    DELTASONE    90 tablet    Take 1 tablet (5 mg) by mouth daily    Thyroid cancer (H), Liver replaced by transplant (H)       PRESERVISION AREDS 2 Caps      Take 2 capsules by mouth daily    Dry eyes, bilateral       sirolimus Tabs tablet     15 tablet    Take 0.5 mg by mouth every other day    Liver replaced by transplant (H)       STATIN NOT PRESCRIBED (INTENTIONAL)     0 each    Statin not prescribed intentionally due to Intolerance (with supporting documentation of trying a statin at least once within the last 5 years)    Statin intolerance       STATIN NOT PRESCRIBED (INTENTIONAL)      Please choose reason not prescribed, below    Statin intolerance       SUMAtriptan 50 MG tablet    IMITREX    30 tablet    Take 1 tablet (50 mg) by mouth at onset of headache for migraine repeat after 2 hours if needed.    Thyroid cancer (H)       triamcinolone 0.1 % cream    KENALOG    80 g    Apply topically 2 times daily Apply to rash    Contact dermatitis and other eczema, due to unspecified cause       ursodiol 250 MG tablet    ACTIGALL    180 tablet    Take 1 tablet (250 mg) by mouth 2 times daily    Liver replaced by transplant (H)       voriconazole 200 MG tablet    VFEND    60 tablet    Take 1 tablet (200 mg) by mouth 2 times daily    Coccidioidal pneumonitis (H)

## 2017-07-12 NOTE — PROGRESS NOTES
HPI  Luz Thompson is a 66 year old female here for glaucoma suspect follow-up. She feels her vision is good and stable in both eyes with her current glasses.  No eye pain, redness, discharge. No flashes/floaters.    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  (primary encounter diagnosis)  Comment: Based on asymmetric disc appearance with thin temporal rim left eye    FH: + grandfather  Pachymetry: 577/579 (2016)  Gonioscopy:  Tmax: High teens OU  Today's IOP: 13/13  Target IOP:  Current medications: None  Meds to avoid: None  Visual field: OVF 24-2 (7/12/17)  OD: Few depressed points on pattern deviation, MD -4.0, PSD 2.8  OS: Few depressed points on pattern deviation, MD -3.6, PSD 3.8  Nerve OCT: [employer] optic nerve OCT (7/12/17)  OD: Avg RNFL thickness 89, all green  OS: Avg RNFL thickness 84, superior red, rest green   - stable    Normal IOPs today with no glaucomatous defects on VF. Some superior thinning on nerve OCT OS, but stable and no corresponding field defect and mildly anomalous appearance to nerve.    Plan: Observe for now. Repeat OVF 24-2 and nerve OCT in 1 year.     (H35.31) Age-related macular degeneration, dry, both eyes  Comment: Followed by Dr. Frost, seen today. Bilateral drusen. No heme or SRF.  Plan: Continue AREDS2 and Amsler grid monitoring    (H35.371) Epiretinal membrane, right eye  Comment: Mild  Plan: Observe    (Z96.1) Pseudophakia, both eyes  Comment: Clear visual axis  Plan: Observe    (H04.123) Dry eyes, bilateral  Comment: Mild  Plan: Continue ATs 2-3 x daily     -----------------------------------------------------------------------------------    Patient disposition:   Return in about 1 year (around 7/12/2018) for applanation, OVF 24-2, dilation, nerve OCT OU. or sooner as needed.    Teaching statement:  Complete documentation of historical and exam elements from today's encounter can be found in the full encounter summary report (not reduplicated  in this progress note). I personally obtained the chief complaint(s) and history of present illness.  I confirmed and edited as necessary the review of systems, past medical/surgical history, family history, social history, and examination findings as documented by others; and I examined the patient myself. I personally reviewed the relevant tests, images, and reports as documented above.     I formulated and edited as necessary the assessment and plan and discussed the findings and management plan with the patient and family.      Dora Tovar MD  Comprehensive Ophthalmology & Ocular Pathology  Department of Ophthalmology and Visual Neurosciences  [email or web address]  Pager [phone]

## 2017-07-12 NOTE — TELEPHONE ENCOUNTER
Date: 7/12/2017    Time of Call: 2:03 PM     Diagnosis:  Hyperlipidemia, statin intolerance     [ TORB ] Ordering provider: Dr. Reyna  Order: Start Ezetimbe 5 mg (one-half of a 10 mg tablet) by mouth once every other day for 3 weeks.   Stop if new muscle pain or weakness, then notify Dr. Reyna, and not resume without further instructions.  After 3 weeks, increase to Ezetimbe 10 mg by mouth once every other day for 3 weeks. Stop if new muscle pain or weakness, then notify Dr. Reyna, and not resume without further instructions.  After 3 weeks, increase to Ezetimbe 5 mg (one-half of a 10 mg tablet) every other day alternating with 10 mg every other day for 3 weeks.  Stop if new muscle pain or weakness, then notify Dr. Reyna, and not resume without further instructions. Increase to 10 mg daily thereafter.  Repeat fasting lipid profile and CMP after taking the Ezetimbe 10 mg daily for 3 weeks.     Order received by: Kaila Thompson, RN, BSN     Follow-up/additional notes: Anvatot message sent.

## 2017-07-13 DIAGNOSIS — Z94.4 LIVER REPLACED BY TRANSPLANT (H): ICD-10-CM

## 2017-07-13 DIAGNOSIS — E89.0 POSTABLATIVE HYPOTHYROIDISM: ICD-10-CM

## 2017-07-13 LAB
ERYTHROCYTE [DISTWIDTH] IN BLOOD BY AUTOMATED COUNT: 16.6 % (ref 10–15)
HCT VFR BLD AUTO: 35.3 % (ref 35–47)
HGB BLD-MCNC: 11.3 G/DL (ref 11.7–15.7)
MCH RBC QN AUTO: 28.3 PG (ref 26.5–33)
MCHC RBC AUTO-ENTMCNC: 32 G/DL (ref 31.5–36.5)
MCV RBC AUTO: 89 FL (ref 78–100)
PLATELET # BLD AUTO: 344 10E9/L (ref 150–450)
RBC # BLD AUTO: 3.99 10E12/L (ref 3.8–5.2)
SIROLIMUS BLD-MCNC: 5.8 UG/L (ref 5–15)
TME LAST DOSE: NORMAL H
WBC # BLD AUTO: 8.7 10E9/L (ref 4–11)

## 2017-07-13 PROCEDURE — 83735 ASSAY OF MAGNESIUM: CPT | Performed by: FAMILY MEDICINE

## 2017-07-13 PROCEDURE — 00000344 ZZHCL STATISTIC REMEASURE THYROGLOBULIN: Mod: 90 | Performed by: FAMILY MEDICINE

## 2017-07-13 PROCEDURE — 80195 ASSAY OF SIROLIMUS: CPT | Performed by: INTERNAL MEDICINE

## 2017-07-13 PROCEDURE — 84443 ASSAY THYROID STIM HORMONE: CPT | Performed by: FAMILY MEDICINE

## 2017-07-13 PROCEDURE — 84439 ASSAY OF FREE THYROXINE: CPT | Performed by: FAMILY MEDICINE

## 2017-07-13 PROCEDURE — 86800 THYROGLOBULIN ANTIBODY: CPT | Mod: 90 | Performed by: FAMILY MEDICINE

## 2017-07-13 PROCEDURE — 80048 BASIC METABOLIC PNL TOTAL CA: CPT | Performed by: INTERNAL MEDICINE

## 2017-07-13 PROCEDURE — 85027 COMPLETE CBC AUTOMATED: CPT | Performed by: FAMILY MEDICINE

## 2017-07-13 PROCEDURE — 99000 SPECIMEN HANDLING OFFICE-LAB: CPT | Performed by: FAMILY MEDICINE

## 2017-07-13 PROCEDURE — 80076 HEPATIC FUNCTION PANEL: CPT | Performed by: INTERNAL MEDICINE

## 2017-07-13 PROCEDURE — 36415 COLL VENOUS BLD VENIPUNCTURE: CPT | Performed by: INTERNAL MEDICINE

## 2017-07-13 PROCEDURE — 84432 ASSAY OF THYROGLOBULIN: CPT | Mod: 90 | Performed by: FAMILY MEDICINE

## 2017-07-14 LAB
ALBUMIN SERPL-MCNC: 3.1 G/DL (ref 3.4–5)
ALP SERPL-CCNC: 272 U/L (ref 40–150)
ALT SERPL W P-5'-P-CCNC: 105 U/L (ref 0–50)
ANION GAP SERPL CALCULATED.3IONS-SCNC: 9 MMOL/L (ref 3–14)
AST SERPL W P-5'-P-CCNC: 44 U/L (ref 0–45)
BILIRUB DIRECT SERPL-MCNC: <0.1 MG/DL (ref 0–0.2)
BILIRUB SERPL-MCNC: 0.3 MG/DL (ref 0.2–1.3)
BUN SERPL-MCNC: 33 MG/DL (ref 7–30)
CALCIUM SERPL-MCNC: 9.3 MG/DL (ref 8.5–10.1)
CHLORIDE SERPL-SCNC: 102 MMOL/L (ref 94–109)
CO2 SERPL-SCNC: 28 MMOL/L (ref 20–32)
CREAT SERPL-MCNC: 1.3 MG/DL (ref 0.52–1.04)
GFR SERPL CREATININE-BSD FRML MDRD: 41 ML/MIN/1.7M2
GLUCOSE SERPL-MCNC: 110 MG/DL (ref 70–99)
MAGNESIUM SERPL-MCNC: 2.4 MG/DL (ref 1.6–2.3)
POTASSIUM SERPL-SCNC: 3.6 MMOL/L (ref 3.4–5.3)
PROT SERPL-MCNC: 7.5 G/DL (ref 6.8–8.8)
SODIUM SERPL-SCNC: 139 MMOL/L (ref 133–144)
T4 FREE SERPL-MCNC: 1.59 NG/DL (ref 0.76–1.46)
TSH SERPL DL<=0.05 MIU/L-ACNC: 3.66 MU/L (ref 0.4–4)

## 2017-07-19 LAB — LAB SCANNED RESULT: NORMAL

## 2017-07-21 ENCOUNTER — TELEPHONE (OUTPATIENT)
Dept: ENDOCRINOLOGY | Facility: CLINIC | Age: 67
End: 2017-07-21

## 2017-07-21 NOTE — TELEPHONE ENCOUNTER
"Per Pedro Message:  Please schedule f/up apt in spring.     Called patient to schedule appointment. Patient was angry and stated that her mother was dying and our message should have read that we would call her in the spring. Patient states, \"If you people can't call me in the spring then I don't need you\". Patient disconnected phone call.    Lisa Andrade LPN    "

## 2017-07-24 ENCOUNTER — TELEPHONE (OUTPATIENT)
Dept: TRANSPLANT | Facility: CLINIC | Age: 67
End: 2017-07-24

## 2017-07-25 ENCOUNTER — TELEPHONE (OUTPATIENT)
Dept: TRANSPLANT | Facility: CLINIC | Age: 67
End: 2017-07-25

## 2017-07-25 NOTE — Clinical Note
Hi~ Can you please assist. I attempted to get rapamune approved, but they would not allow me to.  She is on vori and rapamune, so drug interaction. Dr hernandez and Dr long are well aware. We have adjusted the dosing to every other day on rapamune.  Thanks Crystal

## 2017-07-25 NOTE — TELEPHONE ENCOUNTER
Prior Authorization Specialty Medication Request    Medication/Dose: Rapamune     Diagnosis and ICD: z94.4    New/Renewal/Insurance Change PA: Pt needs renewal and also a release regarding being on rapamune and voriconazole.    Important Lab Values:     Previously Tried and Failed Therapies: Pt has been on rapamune and vori since Jan 2017. We have decreased the rapamune to every other day dosing to accomodate voriconazole and are monitoring labs.   Pt has [employer] insurance. Contact [phone] for Prior Auth.     If you received a fax notification from an outside Pharmacy;  Pharmacy Name: [facility]  Pharmacy #:  Pharmacy Fax:

## 2017-07-26 DIAGNOSIS — Z94.4 LIVER REPLACED BY TRANSPLANT (H): Primary | ICD-10-CM

## 2017-07-27 ENCOUNTER — ANESTHESIA EVENT (OUTPATIENT)
Dept: GASTROENTEROLOGY | Facility: CLINIC | Age: 67
DRG: 393 | End: 2017-07-27
Payer: MEDICARE

## 2017-07-27 PROBLEM — H61.23 BILATERAL IMPACTED CERUMEN: Status: RESOLVED | Noted: 2017-05-02 | Resolved: 2017-07-27

## 2017-07-29 ENCOUNTER — HEALTH MAINTENANCE LETTER (OUTPATIENT)
Age: 67
End: 2017-07-29

## 2017-07-31 ENCOUNTER — OFFICE VISIT (OUTPATIENT)
Dept: SURGERY | Facility: CLINIC | Age: 67
End: 2017-07-31

## 2017-07-31 ENCOUNTER — APPOINTMENT (OUTPATIENT)
Dept: SURGERY | Facility: CLINIC | Age: 67
End: 2017-07-31

## 2017-07-31 ENCOUNTER — OFFICE VISIT (OUTPATIENT)
Dept: PULMONOLOGY | Facility: CLINIC | Age: 67
End: 2017-07-31
Attending: INTERNAL MEDICINE
Payer: MEDICARE

## 2017-07-31 VITALS
DIASTOLIC BLOOD PRESSURE: 74 MMHG | HEIGHT: 68 IN | OXYGEN SATURATION: 100 % | SYSTOLIC BLOOD PRESSURE: 164 MMHG | BODY MASS INDEX: 28.64 KG/M2 | HEART RATE: 73 BPM | RESPIRATION RATE: 18 BRPM | WEIGHT: 189 LBS

## 2017-07-31 VITALS
TEMPERATURE: 97.7 F | HEIGHT: 68 IN | SYSTOLIC BLOOD PRESSURE: 148 MMHG | WEIGHT: 185.4 LBS | DIASTOLIC BLOOD PRESSURE: 90 MMHG | RESPIRATION RATE: 14 BRPM | HEART RATE: 77 BPM | BODY MASS INDEX: 28.1 KG/M2 | OXYGEN SATURATION: 97 %

## 2017-07-31 DIAGNOSIS — Z94.4 LIVER REPLACED BY TRANSPLANT (H): Primary | ICD-10-CM

## 2017-07-31 DIAGNOSIS — B38.2 PRIMARY COCCIDIOIDOMYCOSIS (PULMONARY) (H): Primary | ICD-10-CM

## 2017-07-31 DIAGNOSIS — R91.1 SOLITARY LUNG NODULE: ICD-10-CM

## 2017-07-31 DIAGNOSIS — Z01.818 PREOP EXAMINATION: Primary | ICD-10-CM

## 2017-07-31 DIAGNOSIS — Z01.89 DIAGNOSTIC SKIN AND SENSITIZATION TESTS: ICD-10-CM

## 2017-07-31 PROCEDURE — 99212 OFFICE O/P EST SF 10 MIN: CPT | Mod: ZF

## 2017-07-31 RX ORDER — FOLIC ACID 1 MG/1
1 TABLET ORAL DAILY
Qty: 100 TABLET | Refills: 3 | Status: SHIPPED | OUTPATIENT
Start: 2017-07-31 | End: 2018-11-21

## 2017-07-31 ASSESSMENT — ENCOUNTER SYMPTOMS
ORTHOPNEA: 0
DYSRHYTHMIAS: 1

## 2017-07-31 ASSESSMENT — PAIN SCALES - GENERAL: PAINLEVEL: NO PAIN (0)

## 2017-07-31 ASSESSMENT — LIFESTYLE VARIABLES: TOBACCO_USE: 1

## 2017-07-31 NOTE — H&P
"    Pre-Operative H & P     Date of Encounter: 7/31/2017  Primary Care Physician:  Jennifer Barraza    CC: Screening colonoscopy.      HPI:  Luz Thompson is a 67 year old female who presents for pre-operative H & P in preparation for Colonoscopy on 8/31/17 with Dr. Eneida Montano at [facility].   History is obtained from the patient and the medical record.    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 Gentry Ramirez, [facility]       Macular degeneration      Migraines 4/4/2012     Osteoarthritis of right knee 8/2/2012     Osteoporosis 4/20/2012     Paroxysmal a-fib (H) 6/13/2017     Post-surgical hypothyroidism      Postablative hypothyroidism 8/13/2012     Primary biliary cirrhosis (H)     s/p Liver transplant, [id number]     Sjogren's syndrome (H)      Statin intolerance      Unspecified cerebral artery occlusion with cerebral infarction 2001    when BP was very low, small multiple infacts in frontal lobe, had \"visual field cut,\" leg weakness, and expressive aphasia - all have resolved.      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     Past " Surgical History:  Past Surgical History:   Procedure Laterality Date     APPENDECTOMY  1961     BIOPSY       CATARACT IOL, RT/LT      RE12/19/2013, LE12/10/2013 - Toric lenses     CHOLECYSTECTOMY  1991     COLONOSCOPY       COLONOSCOPY  3/10/2014    Procedure: COLONOSCOPY;;  Surgeon: Gentry Ramirez MD;  Location:  GI     ENDOSCOPIC RETROGRADE CHOLANGIOPANCREATOGRAM  9/19/2013    Procedure: ENDOSCOPIC RETROGRADE CHOLANGIOPANCREATOGRAM;  Endoscopic Retrograde Cholangiopancreatogram with single balloon enteroscopy, ballon sweep of bile duct;  Surgeon: Brett Membreno MD;  Location: [facility] OR     ENT SURGERY      ear drum repair     HC KNEE SCOPE,MED/LAT MENISECTOMY  8/10/12    Right, partial medial menisectomy only     KNEE SURGERY  1966    R knee     PICC INSERTION  9/18/2013    4fr SL PASV PICC, 40cm (1cm external) in the R basilic vein w/ tip in the low SVC     PICC INSERTION  2/21/2014    5 fr DL BioFlo Navilyst PICC, 46 cm (3 cm external) in the L basilic vein w/ tip in the SVC RA junction.     THYROIDECTOMY  3/2010     TRANSPLANT LIVER RECIPIENT LIVING UNRELATED       Hx of Blood transfusions/reactions: History of transfusions, no known reactions, but reportedly has blood antibodies.       Hx of abnormal bleeding or anti-platelet use: Per Dr. Montano, the patient should hold Eliquis prior to the procedure.      Menstrual history: No LMP recorded. Patient is postmenopausal.    Steroid use in the last year: Prednisone 5 mg PO QD.    Personal or FH of difficulty with anesthesia: Denies.    Prior to admission medications  Current Outpatient Prescriptions   Medication Sig Dispense Refill     folic acid (FOLVITE) 1 MG tablet Take 1 tablet (1 mg) by mouth daily 100 tablet 3     ezetimibe (ZETIA) 10 MG tablet Take 1 tablet (10 mg) by mouth daily , or as directed by Dr. Reyna. (Patient taking differently: Take 10 mg by mouth every other day , or as directed by Dr. Reyna.) 90 tablet 3     furosemide (LASIX) 20 MG  tablet Take 0.5 tablets (10 mg) by mouth daily 46 tablet 3     ASPIRIN NOT PRESCRIBED (INTENTIONAL) Please choose reason not prescribed, below 0 each 0     STATIN NOT PRESCRIBED, INTENTIONAL, Please choose reason not prescribed, below       hydrALAZINE (APRESOLINE) 25 MG tablet Take 0.5 tablets (12.5 mg) by mouth 3 times daily as needed , if systolic blood pressure is more than 140 mmHg. 270 tablet 3     levothyroxine (SYNTHROID/LEVOTHROID) 150 MCG tablet Take one tablet daily 6 days a week and one and a half tablets one day a week. 96 tablet 3     calcitRIOL (ROCALTROL) 0.5 MCG capsule Take 1 capsule (0.5 mcg) by mouth daily 90 capsule 3     voriconazole (VFEND) 200 MG tablet Take 1 tablet (200 mg) by mouth 2 times daily 60 tablet 11     apixaban ANTICOAGULANT (ELIQUIS) 5 MG tablet Take 1 tablet (5 mg) by mouth 2 times daily 60 tablet 3     metoprolol (TOPROL-XL) 25 MG 24 hr tablet Take 1 tablet (25 mg) by mouth daily 30 tablet 3     predniSONE (DELTASONE) 5 MG tablet Take 1 tablet (5 mg) by mouth daily 90 tablet 3     RAPAMUNE 0.5 MG PO TABLET Take 0.5 mg by mouth every other day 15 tablet 11     albuterol (ALBUTEROL) 108 (90 BASE) MCG/ACT Inhaler Inhale 2 puffs into the lungs every 4 hours as needed for shortness of breath / dyspnea or wheezing Reported on 4/25/2017       clotrimazole (LOTRIMIN) 1 % cream Apply topically 2 times daily as needed Reported on 4/25/2017       ursodiol (ACTIGALL) 250 MG tablet Take 1 tablet (250 mg) by mouth 2 times daily 180 tablet 3     Wheat Dextrin (BENEFIBER) POWD 2 teaspoons daily       SUMAtriptan (IMITREX) 50 MG tablet Take 1 tablet (50 mg) by mouth at onset of headache for migraine repeat after 2 hours if needed. 30 tablet 3     meclizine (ANTIVERT) 25 MG tablet Take 1 tablet (25 mg) by mouth as needed 30 tablet 11     STATIN NOT PRESCRIBED, INTENTIONAL, Statin not prescribed intentionally due to Intolerance (with supporting documentation of trying a statin at least once  within the last 5 years) 0 each 0     Multiple Vitamins-Minerals (PRESERVISION AREDS 2) CAPS Take 2 capsules by mouth daily (Patient taking differently: Take 1 capsule by mouth 2 times daily )       triamcinolone (KENALOG) 0.1 % cream Apply topically 2 times daily Apply to rash (Patient taking differently: Apply topically 2 times daily as needed Apply to rash) 80 g 3     Hypromellose (ARTIFICIAL TEARS OP) Apply 1 drop to eye as needed       acetaminophen 500 MG CAPS Take 500 mg by mouth as needed Take 500-1,000 mg by mouth every 6 hours if needed.       magnesium 250 MG tablet Take 2 tablets by mouth daily At night.        Allergies  Allergies   Allergen Reactions     Fluconazole Hives and Itching     Azithromycin Itching     Benadryl [Diphenhydramine Hcl]      Insomnia      Cefpodoxime Itching     Cellcept Diarrhea     Ciprofloxacin Other (See Comments)     Insomnia, mood lability     Codeine      Psych disturbance     Diphenhydramine Other (See Comments)     Doxycycline      Lansoprazole Diarrhea     Levaquin [Levofloxacin] Other (See Comments)     Headache, hyperactivity     Lisinopril Cough     Methotrexate      Sores     Morphine Itching     Morphine Sulfate Itching     Mycophenolate Diarrhea     Pepcid Diarrhea     Pravastatin Muscle Pain (Myalgia)     Prevacid Diarrhea     Ranitidine Diarrhea     Simvastatin Muscle Pain (Myalgia)     severe     Zantac Diarrhea     Cephalexin Rash     Fever and skin burning     Penicillin G Itching and Rash     Penicillins Rash     Tolectin [Nsaids] Rash     Tramadol Rash     Social History  Social History     Social History     Marital status:      Spouse name: Robbin Thompson     Number of children: 4     Years of education: 20     Occupational History     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 She and her  travel around the [place] in an RV. [place] usually are in the [place] the course of the winter. 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     Family History  Family History   Problem Relation Age of Onset     Hypertension Mother      Endometrial Cancer Mother      Hyperlipidemia Mother      Prostate Cancer Father      Macular Degeneration Father      Cancer - colorectal Maternal Grandmother      in her 80's, has surgery and removal of part of kidney,  at age 98     HEART DISEASE Maternal Grandfather       at 98     Glaucoma Maternal Grandfather      CEREBROVASCULAR DISEASE Paternal Grandmother      in her 80's     Hypertension Paternal Grandmother      HEART DISEASE Paternal Grandfather      MI     Alzheimer Disease Paternal Grandfather      Allergies Son      Neurologic Disorder Daughter      Migraines     Breast Cancer Other      Anesthesia Reaction No family hx of      Crohn Disease No family hx of      Ulcerative Colitis No family hx of      Review of Systems  Functional status: Independent in ADL's.  4 METS.     The complete review of systems is negative other than noted in the HPI or here.   Constitutional: Denies recent changes in sleeping patterns, or fevers/chills.  Reports an approximately 17 pound weight gain since April.    Eyes: No recent vision changes.  EENT: Denies recent changes in hearing, mouth pain, or difficulty swallowing.  Cardiovascular: Denies chest pain, [place] or orthopnea, or palpitations.  Respiratory: " "Denies shortness of breath or significant cough.    GI: Denies nausea/vomiting or diarrhea/constipation.    : Denies dysuria.    Musculoskeletal: Denies joint pain or swelling.    Skin: Denies rashes or wounds.    Hematologic: Denies easy bruising or bleeding.    Neurologic: Denies migraines, seizures, dizziness, numbness/tingling.  Psychiatric: Denies changes in mood or affect.      /90  Pulse 77  Temp 97.7  F (36.5  C) (Oral)  Resp 14  Ht 1.715 m (5' 7.5\")  Wt 84.1 kg (185 lb 6.4 oz)  SpO2 97%  BMI 28.61 kg/m2    185 lbs 6.4 oz  5' 7.5\"   Body mass index is 28.61 kg/(m^2).    Physical Exam  Constitutional: Patient awake, seated upright in a chair, in no apparent distress.  Appears stated age.  Eyes: Pupils equal, round and reactive to light.  Extra ocular muscles intact. Sclera clear.  Conjunctiva normal.  HENT: Head normocephalic.  Oral pharynx intact with moist mucous membranes.  Dentition with upper and lower dentures.  No thyromegaly appreciated.   Respiratory: Lung sounds clear to auscultation bilaterally.  No rales, rhonchi, or wheezing noted.    Cardiovascular: S1, S2, regular rate and rhythm.   1/6 murmur noted.  No rubs, or gallops noted. Radial and pedal pulses palpable, bilaterally.  Trace pedal edema noted, bilaterally.    GI: Bowel sounds present.  Abdomen obese, soft, non-tender to light palpation.  No hepatosplenomegaly or masses palpated.   Genitourinary: Exam deferred.  Lymph/Hematologic: No cervical or supraclavicular lymphadenopathy noted.  No excessive bruising noted.    Skin: Color appropriate for race, warm, dry.    Musculoskeletal: Slightly limited extension of the neck.  No redness, warmth, or swelling of the joints noted. Gross motor strength is normal.    Neurologic: Alert, oriented to name, place and time. Cranial nerves II-XII are grossly intact. Gait is normal.      Neuropsychiatric: Calm, cooperative. Normal affect.     Labs:  7/13/17: WBC 8.7; Hgb 11.3; Hct 35.3; Plt " 344  17: Na 139; K 3.6; Cl 102; Glu 110; BUN 33; Cr 1.30; Ca 9.3; Mag 2.4; Bili total 0.3; Albumin 3.1; Alk phos 272; ; AST 44    Imagin/17/15 Echocardiogram:  Interpretation Summary  Global and regional left ventricular function is normal with an EF of 60-65%.  Right ventricular function, chamber size, wall motion, and thickness are normal.  Pulmonary artery systolic pressure is normal.  The inferior vena cava is normal.  No pericardial effusion is present.    17 PFT:  The FVC, FEV1, and FEV1/FVC ratio are within normal limits. The SVC is reduced. The diffusing capacity is normal.  IMPRESSION:  Normal Spirometry.  Normal Diffusing Capacity.    17 EKG: Sinus rhythm  Lab results, EKG were personally reviewed by this provider.      Assessment and Plan  Luz Thompson is a 67 year old female scheduled to undergo Colonoscopy on 17 with Dr. Eneida Montano.    She has the following specific operative considerations:   1.  Increased risk for obstructive sleep apnea: Recommend careful positioning to prevent airway compromise and close monitoring of the patient's respiratory status.    2.  History of CVA: Recommend that prolonged episodes of hypo- or hypertension be avoided during the perioperative period.  3.  HTN: Recommend that the patient be instructed to continue Toprol as prescribed.    4.  Paroxysmal atrial fibrillation: Recommend that the patient be instructed to continue Toprol for rate control.  Patient should hold Eliquis prior to the procedure.    5.  Chronic steroid use: Patient takes Prednisone 5 mg PO QD.  Do not anticipate that stress-dose steroids will be required.  6.  CKD: Recommend close monitoring of fluid balance and electrolytes throughout the perioperative period.    7.  Anemia: Recommend close monitoring for postoperative bleeding.    Revised Cardiac Risk Index: 0.9% risk of major adverse cardiac event.  VTE risk: 1.8%  RASHIDA risk: Intermediate  PONV risk score= 2.   (If > 2, anti-emetic intervention is recommended.)  Anesthesia considerations: Refer to PAC assessment in the anesthesia records.    Patient was discussed with Dr. Purcell.    Alpa Osman NP  Preoperative Assessment Center  [facility]  Phone: [phone]  Fax: [phone]

## 2017-07-31 NOTE — NURSING NOTE
Chief Complaint   Patient presents with     RECHECK     3 month follow up on [place] and her lung issues     Asael Guevara CMA at 10:21 AM on 7/31/2017

## 2017-07-31 NOTE — PATIENT INSTRUCTIONS
Bring CT with January CT when you come back. You can give to my clinic staff or Dr Montero's     We will schedule PET for next visit

## 2017-07-31 NOTE — PROGRESS NOTES
Preoperative Assessment Center medication history for July 31, 2017 is complete.    See Epic admission navigator for allergy information, pharmacy and prior to admission medications.    Operating room staff will still need to confirm medications and last dose information on day of surgery.     Medication history interview sources:  patient    Changes made to PTA medication list (reason)  Added: none  Deleted: breo ellipta - stopped by MD.   DC duplicate of folic acid  Changed: clotrimazole and kenalog to PRN, zetia changed to QOD. Sig updated on preservision.     Additional medication history information (including reliability of information, actions taken by pharmacist):None    Prior to Admission medications    Medication Sig Last Dose Taking? Auth Provider   folic acid (FOLVITE) 1 MG tablet Take 1 tablet (1 mg) by mouth daily Taking Yes Gentry Ramirez MD   ezetimibe (ZETIA) 10 MG tablet Take 1 tablet (10 mg) by mouth daily , or as directed by Dr. Reyna.  Patient taking differently: Take 10 mg by mouth every other day , or as directed by Dr. Reyna. Taking Yes Randell Reyna MD   furosemide (LASIX) 20 MG tablet Take 0.5 tablets (10 mg) by mouth daily Taking Yes Randell Reyna MD   hydrALAZINE (APRESOLINE) 25 MG tablet Take 0.5 tablets (12.5 mg) by mouth 3 times daily as needed , if systolic blood pressure is more than 140 mmHg. Taking Yes Randell Reyna MD   levothyroxine (SYNTHROID/LEVOTHROID) 150 MCG tablet Take one tablet daily 6 days a week and one and a half tablets one day a week. Taking Yes Rach Vasquez MD   calcitRIOL (ROCALTROL) 0.5 MCG capsule Take 1 capsule (0.5 mcg) by mouth daily Taking Yes Rach Vasquez MD   voriconazole (VFEND) 200 MG tablet Take 1 tablet (200 mg) by mouth 2 times daily Taking Yes Crystal Montero MD   apixaban ANTICOAGULANT (ELIQUIS) 5 MG tablet Take 1 tablet (5 mg) by mouth 2 times daily Taking Yes Graham Gilmore MD   metoprolol  (TOPROL-XL) 25 MG 24 hr tablet Take 1 tablet (25 mg) by mouth daily Taking Yes Graham Gilmore MD   predniSONE (DELTASONE) 5 MG tablet Take 1 tablet (5 mg) by mouth daily Taking Yes Gentry Ramirez MD   RAPAMUNE 0.5 MG PO TABLET Take 0.5 mg by mouth every other day Taking Yes Crystal Montero MD   albuterol (ALBUTEROL) 108 (90 BASE) MCG/ACT Inhaler Inhale 2 puffs into the lungs every 4 hours as needed for shortness of breath / dyspnea or wheezing Reported on 4/25/2017 Taking Yes Crystal Montero MD   clotrimazole (LOTRIMIN) 1 % cream Apply topically 2 times daily as needed Reported on 4/25/2017 Taking Yes Crystal Montero MD   ursodiol (ACTIGALL) 250 MG tablet Take 1 tablet (250 mg) by mouth 2 times daily Taking Yes Gentry Ramirez MD   Wheat Dextrin (BENEFIBER) POWD 2 teaspoons daily Taking Yes Crystal Montero MD   SUMAtriptan (IMITREX) 50 MG tablet Take 1 tablet (50 mg) by mouth at onset of headache for migraine repeat after 2 hours if needed. Taking Yes Gentry Ramirez MD   meclizine (ANTIVERT) 25 MG tablet Take 1 tablet (25 mg) by mouth as needed Taking Yes Gentry Ramirez MD   Multiple Vitamins-Minerals (PRESERVISION AREDS 2) CAPS Take 2 capsules by mouth daily  Patient taking differently: Take 1 capsule by mouth 2 times daily  Taking Yes Eric Ward MD   triamcinolone (KENALOG) 0.1 % cream Apply topically 2 times daily Apply to rash  Patient taking differently: Apply topically 2 times daily as needed Apply to rash Taking Yes Jennifer Barraza MD   Hypromellose (ARTIFICIAL TEARS OP) Apply 1 drop to eye as needed Taking Yes Reported, Patient   acetaminophen 500 MG CAPS Take 500 mg by mouth as needed Take 500-1,000 mg by mouth every 6 hours if needed. Taking Yes Reported, Patient   magnesium 250 MG tablet Take 2 tablets by mouth daily At night.  Taking Yes Unknown, Entered By History   ASPIRIN NOT PRESCRIBED (INTENTIONAL) Please choose reason not prescribed, below    Cinthia Billy PA-C   STATIN NOT PRESCRIBED, INTENTIONAL, Please choose reason not prescribed, below   Cinthia Billy PA-C   STATIN NOT PRESCRIBED, INTENTIONAL, Statin not prescribed intentionally due to Intolerance (with supporting documentation of trying a statin at least once within the last 5 years)   Jennifer Barraza MD            Medication history completed by: Jed Rosenbaum [facility]

## 2017-07-31 NOTE — ANESTHESIA PREPROCEDURE EVALUATION
"  Anesthesia Evaluation     . Pt has had prior anesthetic. Type: General and MAC    History of anesthetic complications    History of traumatic intubation with injury to her teeth.  This occurred when she was in a coma related to her hepatic disease.      ROS/MED HX    ENT/Pulmonary:     (+)RASHIDA risk factors snores loudly, hypertension, tobacco use, Past use , . Other pulmonary disease Recently treated for \"[place] Fever\" uses Albuterol inhaler PRN..    Neurologic: Comment: Last migraine in January 2017, related to Diflucan reaction.  Uses Imitrex.    History of CVA: had problems with \"executive level function\" and processing.  When fatigued, will note that her brain \"becomes scrambled\" and she has difficulty thinking.    (+)migraines, CVA date: 2001 without deficits   (-) TIA   Cardiovascular: Comment: Paroxysmal atrial fibrillation, anticoagulated with Eliquis.    (+) Dyslipidemia, hypertension-range: 140s/70-80s (uses Hydralazine PRN, has been needed to take nearly every other day), ---. Taking blood thinners : Instructions Given to patient: Patient instructed to hold Eliquis prior to the procedure.. . . :. dysrhythmias a-fib, valvular problems/murmurs .      (-) [place], orthopnea/PND and irregular heartbeat/palpitations   METS/Exercise Tolerance: Comment: Activity is limited by left hip arthritic pain, not chest pain, not shortness of breath. 4 - Raking leaves, gardening   Hematologic: Comments: History of blood antibodies.      (+) History of blood clots pt is anticoagulated, Anemia, History of Transfusion no previous transfusion reaction -      Musculoskeletal:   (+) arthritis, , , -       GI/Hepatic: Comment: Primary biliary cirrhosis.  History of esophageal varices with bleeding.    History of liver transplant in 2002.    (+) bowel prep, liver disease, Other GI/Hepatic Sigmoid diverticulosis.        Renal/Genitourinary:     (+) chronic renal disease, type: CRI, Pt does not require dialysis, Pt has no " history of transplant, Other Renal/ Genitourinary, History of UTIs.      Endo:     (+) thyroid problem hypothyroidism, Chronic steroid usage for Date most recently used: Prednisone 5 mg PO QD.  ,.      Psychiatric:     (+) psychiatric history anxiety      Infectious Disease: Comment: Chronic EBV infection, treated with Rituxan in the Summer of 2016.    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  Being followed by ID, treated with Voriconazole.        Malignancy:   (+) Malignancy History of Other and Skin  Skin CA Remission status post Surgery, Other CA H/O thyroid papillary carcinoma, S/P radioactive ablation, S/P thyroidectomy.   Remission status post Surgery         Other:    - neg other ROS                 Physical Exam      Airway   Mallampati: III  TM distance: >3 FB  Neck ROM: limited  Comment: Small mouth opening.    Dental   (+) upper dentures and lower dentures    Cardiovascular   Rhythm and rate: regular and normal  (+) peripheral edema and murmur     PE comment: 1/6 systolic murmur  1+ pedal edema, bilaterally    Pulmonary    breath sounds clear to auscultation               PAC Discussion and Assessment    ASA Classification: 3  Case is suitable for: [place]  Anesthetic techniques and relevant risks discussed: MAC with GA as backup  Invasive monitoring and risk discussed: No  Types:   Possibility and Risk of blood transfusion discussed: No  NPO instructions given:   Additional anesthetic preparation and risks discussed:   Needs early admission to pre-op area:   Other:     PAC Resident/NP Anesthesia Assessment:  Luz Thompson is a 67 year old female scheduled to undergo Colonoscopy on 8/31/17 with Dr. Eneida Montano.    She has the following specific operative considerations:   1.  Increased risk for obstructive sleep apnea: Recommend careful positioning to prevent airway compromise and close monitoring of the patient's respiratory  status.    2.  History of CVA: Recommend that prolonged episodes of hypo- or hypertension be avoided during the perioperative period.  3.  HTN: Recommend that the patient be instructed to continue Toprol as prescribed.    4.  Paroxysmal atrial fibrillation: Recommend that the patient be instructed to continue Toprol for rate control.  Patient should hold Eliquis prior to the procedure.    5.  Chronic steroid use: Patient takes Prednisone 5 mg PO QD.  Do not anticipate that stress-dose steroids will be required.  6.  CKD: Recommend close monitoring of fluid balance and electrolytes throughout the perioperative period.    7.  Anemia: Recommend close monitoring for postoperative bleeding.    Revised Cardiac Risk Index: 0.9% risk of major adverse cardiac event.  VTE risk: 1.8%  RASHIDA risk: Intermediate  PONV risk score= 2.  (If > 2, anti-emetic intervention is recommended.)  Anesthesia considerations: Refer to PAC assessment in the anesthesia records.      Reviewed and Signed by PAC Mid-Level Provider/Resident  Mid-Level Provider/Resident: Alpa Osman CNP  Date: 7/31/17  Time: 1855    Attending Anesthesiologist Anesthesia Assessment:  I have examined the patient and reviewed the medical record.  I have discussed the patient with the DOMI and concur with her assessment.  The patient os scheduled for colonoscopy under MAC for routine 5 year screening.  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  PFTs 4/2017 were normal.    PE:  Healthy appearing female in no distress.  MPC 2, 4 FBTMD.  Lungs clear.  CVS  RRR with no murmur    No further testing  is necessary.  Final plan per anesthesiologist the day of surgery        Reviewed and Signed by PAC Anesthesiologist  Anesthesiologist: Jefferson Purcell MD  Date: 07/31/2017  Time:   Pass/Fail:   Disposition:     PAC Pharmacist Assessment:        Pharmacist:   Date:   Time:      Anesthesia Plan      History & Physical Review  History and physical reviewed and following examination; no interval change.    ASA Status:  3 .    NPO Status:  > 6 hours    Plan for MAC with Intravenous induction. Maintenance will be TIVA.  Reason for MAC:  Difficulty with conscious sedation (QS)         Postoperative Care      Consents  Anesthetic plan, risks, benefits and alternatives discussed with:  Patient..        Patient seen by me, agree with the above.  Kev Mcadams MD  August 31, 2017                  .

## 2017-07-31 NOTE — MR AVS SNAPSHOT
After Visit Summary   7/31/2017    Luz Thompson    MRN: [id number]           Patient Information     Date Of Birth          1950        Visit Information        Provider Department      7/31/2017 10:30 AM Eden Clinton MD [facility]        Today's Diagnoses     Primary coccidioidomycosis (pulmonary) (H)    -  1    Solitary lung nodule          Care Instructions    Bring CT with January CT when you come back. You can give to my clinic staff or Dr Montero's     We will schedule PET for next visit          Follow-ups after your visit        Your next 10 appointments already scheduled     Jul 31, 2017  1:00 PM CDT   (Arrive by 12:45 PM)   PAC Pharmacist with  Pac Pharmacist   [facility] Preoperative Assessment [place] ([facility])    [address] [place] [address]  4th [facility] [address] [phone]            Jul 31, 2017  1:30 PM CDT   (Arrive by 1:15 PM)   PAC EVALUATION with  Pac Deidre 3   [facility] Preoperative Assessment [place] ([facility])    [address] [place] [address]  4th [facility] [address]   [phone]            Jul 31, 2017  2:30 PM CDT   (Arrive by 2:15 PM)   PAC RN ASSESSMENT with  Pac Rn   [facility] Preoperative Assessment [place] ([facility])    [address] [place] [address]   [phone]            Jul 31, 2017  3:10 PM CDT   (Arrive by 2:55 PM)   PAC Anesthesia Consult with  Pac Anesthesiologist   [facility] Preoperative Assessment [place] ([facility])    [address] [place] [address]  4th [facility] [address]   [phone]            Aug 14, 2017  1:00 PM CDT   (Arrive by 12:45 PM)   ATRIAL FIBULATION VISIT with Graham Gilmore MD   [facility] Heart [place] [facility] ([facility])    [address] [place] [address]  3rd [facility] [address] [phone]            Aug 16, 2017   9:30 AM CDT   (Arrive by 9:15 AM)   Return Visit with Crystal Montero MD   [facility] ([facility])    [address] [facility]  [address]rd [facility] [id number] [phone]            Aug 31, 2017   Procedure with Eneida Montano MD   [facility] [place] Endoscopy ([facility], [facility])    [address]  [facility] [place] [address]   [phone]           The [facility] is located on the corner of [facility] on the [facility]. It is easily accessible from virtually any point in the [facility] area, via [address] and [address].            Sep 19, 2017 11:15 AM CDT   (Arrive by 11:00 AM)   Return General Liver with Gentry Ramirez MD   [facility] Hepatology ([facility])    [address] [id number] [phone]              Future tests that were ordered for you today     Open Future Orders        Priority Expected Expires Ordered    NM PET CT Routine 7/31/2017 8/30/2017 7/31/2017            Who to contact     If you have questions or need follow up information about today's clinic visit or your schedule please contact [facility] directly at [phone].  Normal or non-critical lab and imaging results will be communicated to you by MyChart, letter or phone within 4 business days after the clinic has received the results. If you do not hear from us within 7 days, please contact the clinic through [employer] or phone. If you have a critical or abnormal lab result, we will notify you by phone as soon as possible.  Submit refill requests through [employer] or call your pharmacy and they will forward the refill request to us. Please allow 3 business days for your refill to be completed.          Additional Information About Your Visit        Montnets Information     [employer] gives  "you secure access to your electronic health record. If you see a primary care provider, you can also send messages to your care team and make appointments. If you have questions, please call your primary care clinic.  If you do not have a primary care provider, please call [phone] and they will assist you.        Care EveryWhere ID     This is your Care EveryWhere ID. This could be used by other organizations to access your [place] medical records  [id number]        Your Vitals Were     Pulse Respirations Height Pulse Oximetry BMI (Body Mass Index)       73 18 1.715 m (5' 7.5\") 100% 29.16 kg/m2        Blood Pressure from Last 3 Encounters:   07/31/17 164/74   06/29/17 149/79   06/13/17 124/80    Weight from Last 3 Encounters:   07/31/17 85.7 kg (189 lb)   06/29/17 85.7 kg (189 lb)   06/13/17 84.4 kg (186 lb)               Primary Care Provider Office Phone # Fax #    Jennifer Amparo Barraza [phone]       [facility] [address]  [facility] [address]        Equal Access to Services     EGNE NOWAK AH: Hadii aad ku hadasho Soomaali, waaxda luqadaha, qaybta kaalmada adeegyada, waxay andrewin haypacon дмитрий coley ladanten antony. So [facility] [phone].    ATENCIÓN: Si habla español, tiene a jason disposición servicios gratuitos de asistencia lingüística. Ll[facility] [phone].    We comply with applicable federal civil rights laws and [place] laws. We do not discriminate on the basis of race, color, national origin, age, disability sex, sexual orientation or gender identity.            Thank you!     Thank you for choosing [facility]  for your care. Our goal is always to provide you with excellent care. Hearing back from our patients is one way we can continue to improve our services. Please take a few minutes to complete the written survey that you may receive in the mail after your visit with us. Thank you!             Your Updated Medication List - " Protect others around you: Learn how to safely use, store and throw away your medicines at www.disposemymeds.org.          This list is accurate as of: 7/31/17 11:14 AM.  Always use your most recent med list.                   Brand Name Dispense Instructions for use Diagnosis    acetaminophen 500 MG Caps      Take 500 mg by mouth as needed Take 500-1,000 mg by mouth every 6 hours if needed.        albuterol 108 (90 BASE) MCG/ACT Inhaler   Generic drug:  albuterol      Inhale 2 puffs into the lungs every 4 hours as needed for shortness of breath / dyspnea or wheezing Reported on 4/25/2017        apixaban ANTICOAGULANT 5 MG tablet    ELIQUIS    60 tablet    Take 1 tablet (5 mg) by mouth 2 times daily    Paroxysmal atrial fibrillation (H)       ARTIFICIAL TEARS OP      Apply 1 drop to eye as needed        ASPIRIN NOT PRESCRIBED    INTENTIONAL    0 each    Please choose reason not prescribed, below    Cerebral artery occlusion with cerebral infarction (H)       BENEFIBER Powd      2 teaspoons daily        BREO ELLIPTA 100-25 MCG/INH oral inhaler   Generic drug:  fluticasone-vilanterol      Inhale 1 puff into the lungs daily Reported on 4/25/2017        calcitRIOL 0.5 MCG capsule    ROCALTROL    90 capsule    Take 1 capsule (0.5 mcg) by mouth daily        clotrimazole 1 % cream    LOTRIMIN     Apply topically 2 times daily Reported on 4/25/2017        ezetimibe 10 MG tablet    ZETIA    90 tablet    Take 1 tablet (10 mg) by mouth daily , or as directed by Dr. Reyna.    Hyperlipidemia, unspecified hyperlipidemia type       folic acid 1 MG tablet    FOLVITE    90 tablet    Take 1 tablet (1 mg) by mouth daily    High risk medication use       furosemide 20 MG tablet    LASIX    46 tablet    Take 0.5 tablets (10 mg) by mouth daily    CKD (chronic kidney disease) stage 3, GFR 30-59 ml/min, Liver replaced by transplant (H)       hydrALAZINE 25 MG tablet    APRESOLINE    270 tablet    Take 0.5 tablets (12.5 mg) by mouth 3  times daily as needed , if systolic blood pressure is more than 140 mmHg.    HTN (hypertension)       levothyroxine 150 MCG tablet    SYNTHROID/LEVOTHROID    96 tablet    Take one tablet daily 6 days a week and one and a half tablets one day a week.    Postablative hypothyroidism       magnesium 250 MG tablet      Take 2 tablets by mouth daily At night.        meclizine 25 MG tablet    ANTIVERT    30 tablet    Take 1 tablet (25 mg) by mouth as needed    Liver replaced by transplant (H)       metoprolol 25 MG 24 hr tablet    TOPROL-XL    30 tablet    Take 1 tablet (25 mg) by mouth daily    Paroxysmal atrial fibrillation (H)       predniSONE 5 MG tablet    DELTASONE    90 tablet    Take 1 tablet (5 mg) by mouth daily    Thyroid cancer (H), Liver replaced by transplant (H)       PRESERVISION AREDS 2 Caps      Take 2 capsules by mouth daily    Dry eyes, bilateral       sirolimus Tabs tablet     15 tablet    Take 0.5 mg by mouth every other day    Liver replaced by transplant (H)       STATIN NOT PRESCRIBED (INTENTIONAL)     0 each    Statin not prescribed intentionally due to Intolerance (with supporting documentation of trying a statin at least once within the last 5 years)    Statin intolerance       STATIN NOT PRESCRIBED (INTENTIONAL)      Please choose reason not prescribed, below    Statin intolerance       SUMAtriptan 50 MG tablet    IMITREX    30 tablet    Take 1 tablet (50 mg) by mouth at onset of headache for migraine repeat after 2 hours if needed.    Thyroid cancer (H)       triamcinolone 0.1 % cream    KENALOG    80 g    Apply topically 2 times daily Apply to rash    Contact dermatitis and other eczema, due to unspecified cause       ursodiol 250 MG tablet    ACTIGALL    180 tablet    Take 1 tablet (250 mg) by mouth 2 times daily    Liver replaced by transplant (H)       voriconazole 200 MG tablet    VFEND    60 tablet    Take 1 tablet (200 mg) by mouth 2 times daily    Coccidioidal pneumonitis (H)

## 2017-07-31 NOTE — MR AVS SNAPSHOT
After Visit Summary   7/31/2017    Luz Thompson    MRN: [id number]           Patient Information     Date Of Birth          1950        Visit Information        Provider Department      7/31/2017 1:00 PM Pharmacist, Elijah Cardenas [facility] Preoperative Assessment [place]        Today's Diagnoses     Preop examination    -  1       Follow-ups after your visit        Your next 10 appointments already scheduled     Jul 31, 2017  2:30 PM CDT   (Arrive by 2:15 PM)   PAC RN ASSESSMENT with Elijah Pac Rn   [facility] Preoperative Assessment [place] ([facility])    [address] [place] [id number] [phone]            Jul 31, 2017  3:10 PM CDT   (Arrive by 2:55 PM)   PAC Anesthesia Consult with Elijah Pac Anesthesiologist   [facility] Assessment [place] ([facility])    [address] [place] [id number] [phone]            Jul 31, 2017  3:30 PM CDT   LAB with  LAB   [facility] Lab ([facility])    [address] [place] [id number] [phone]           Patient must bring picture ID. Patient should be prepared to give a urine specimen  Please do not eat 10-12 hours before your appointment if you are coming in fasting for labs on lipids, cholesterol, or glucose (sugar). Pregnant women should follow their Care Team instructions. Water with medications is okay. Do not drink coffee or other fluids. If you have concerns about taking  your medications, please ask at office or if scheduling via [employer], send a message by clicking on Secure Messaging, Message Your Care Team.            Aug 14, 2017  1:00 PM CDT   (Arrive by 12:45 PM)   ATRIAL FIBULATION VISIT with Graham Gilmore MD   [facility] ([facility])    [address] [place] [id number] [phone]            Aug 16, 2017  9:30 AM CDT    (Arrive by 9:15 AM)   Return Visit with MD JERMAIN [facility] and Infectious Diseases ([facility] [place])    [address]  [address] [facility] [address] [phone]            Aug 31, 2017   Procedure with Eneida Montano MD   [facility] [place] Endoscopy ([facility])    [address]  [facility] [place] [id number] [phone]           The [facility] is located on the corner of [facility] [facility] and [facility] [address] [facility] on the [facility] [facility]. It is easily accessible from virtually any point in the [facility] area, via [address] and [address].            Sep 19, 2017 11:15 AM CDT   (Arrive by 11:00 AM)   Return General Liver with Gentry Ramirez MD   [facility] Hepatology ([facility] [place])    [address]  [address] [facility] [address] [phone]              Future tests that were ordered for you today     Open Future Orders        Priority Expected Expires Ordered    NM PET CT Routine 7/31/2017 8/30/2017 7/31/2017            Who to contact     Please call your clinic at [phone] to:    Ask questions about your health    Make or cancel appointments    Discuss your medicines    Learn about your test results    Speak to your doctor   If you have compliments or concerns about an experience at your clinic, or if you wish to file a complaint, please contact [facility] Patient Relations at [phone] or email us at [email or web address]         Additional Information About Your Visit        MyChart Information     [employer] gives you secure access to your electronic health record. If you see a primary care provider, you can also send messages to your care team and make appointments. If you have questions, please call your primary care clinic.  If you do not have a primary care provider, please call [phone] and  they will assist you.      [employer] is an electronic gateway that provides easy, online access to your medical records. With [employer], you can request a clinic appointment, read your test results, renew a prescription or communicate with your care team.     To access your existing account, please contact your [facility] or call [phone] for assistance.        Care EveryWhere ID     This is your Care EveryWhere ID. This could be used by other organizations to access your [place] medical records  [id number]         Blood Pressure from Last 3 Encounters:   07/31/17 164/74   06/29/17 149/79   06/13/17 124/80    Weight from Last 3 Encounters:   07/31/17 85.7 kg (189 lb)   06/29/17 85.7 kg (189 lb)   06/13/17 84.4 kg (186 lb)              Today, you had the following     No orders found for display         Today's Medication Changes          These changes are accurate as of: 7/31/17  1:35 PM.  If you have any questions, ask your nurse or doctor.               These medicines have changed or have updated prescriptions.        Dose/Directions    ezetimibe 10 MG tablet   Commonly known as:  ZETIA   This may have changed:    - when to take this  - additional instructions   Used for:  Hyperlipidemia, unspecified hyperlipidemia type        Dose:  10 mg   Take 1 tablet (10 mg) by mouth daily , or as directed by Dr. Reyna.   Quantity:  90 tablet   Refills:  3       PRESERVISION AREDS 2 Caps   This may have changed:    - how much to take  - when to take this   Used for:  Dry eyes, bilateral        Dose:  2 capsule   Take 2 capsules by mouth daily   Refills:  0       triamcinolone 0.1 % cream   Commonly known as:  KENALOG   This may have changed:    - when to take this  - reasons to take this  - additional instructions   Used for:  Contact dermatitis and other eczema, due to unspecified cause        Apply topically 2 times daily Apply to rash   Quantity:  80 g   Refills:  3         Stop taking  these medicines if you haven't already. Please contact your care team if you have questions.     BREO ELLIPTA 100-25 MCG/INH oral inhaler   Generic drug:  fluticasone-vilanterol   Stopped by:  Pharmacist,  Pac                Where to get your medicines      These medications were sent to [id number] - [place], [place] - [address]  [address] [facility] [address]     Phone:  [phone]     folic acid 1 MG tablet                Primary Care Provider Office Phone # Fax #    Jennifer Amparo Barraza [phone]       [facility] [address]  [facility] [address]        Equal Access to Services     CARLA NOWAK : Hadii aad ku hadasho Sokelsi, waaxda luqadaha, qaybta kaalmada adeegyada, etta lu . So [facility] [phone].    ATENCIÓN: Si habla español, tiene a jason disposición servicios gratuitos de asistencia lingüística. [facility] [phone].    We comply with applicable federal civil rights laws and [place] laws. We do not discriminate on the basis of race, color, national origin, age, disability sex, sexual orientation or gender identity.            Thank you!     Thank you for choosing [facility] PREOPERATIVE ASSESSMENT CENTER  for your care. Our goal is always to provide you with excellent care. Hearing back from our patients is one way we can continue to improve our services. Please take a few minutes to complete the written survey that you may receive in the mail after your visit with us. Thank you!             Your Updated Medication List - Protect others around you: Learn how to safely use, store and throw away your medicines at www.disposemymeds.org.          This list is accurate as of: 7/31/17  1:35 PM.  Always use your most recent med list.                   Brand Name Dispense Instructions for use Diagnosis    acetaminophen 500 MG Caps      Take 500 mg by mouth as needed Take 500-1,000 mg by mouth every 6 hours if  needed.        albuterol 108 (90 BASE) MCG/ACT Inhaler   Generic drug:  albuterol      Inhale 2 puffs into the lungs every 4 hours as needed for shortness of breath / dyspnea or wheezing Reported on 4/25/2017        apixaban ANTICOAGULANT 5 MG tablet    ELIQUIS    60 tablet    Take 1 tablet (5 mg) by mouth 2 times daily    Paroxysmal atrial fibrillation (H)       ARTIFICIAL TEARS OP      Apply 1 drop to eye as needed        ASPIRIN NOT PRESCRIBED    INTENTIONAL    0 each    Please choose reason not prescribed, below    Cerebral artery occlusion with cerebral infarction (H)       BENEFIBER Powd      2 teaspoons daily        calcitRIOL 0.5 MCG capsule    ROCALTROL    90 capsule    Take 1 capsule (0.5 mcg) by mouth daily        clotrimazole 1 % cream    LOTRIMIN     Apply topically 2 times daily as needed Reported on 4/25/2017        ezetimibe 10 MG tablet    ZETIA    90 tablet    Take 1 tablet (10 mg) by mouth daily , or as directed by Dr. Reyna.    Hyperlipidemia, unspecified hyperlipidemia type       folic acid 1 MG tablet    FOLVITE    100 tablet    Take 1 tablet (1 mg) by mouth daily    Liver replaced by transplant (H)       furosemide 20 MG tablet    LASIX    46 tablet    Take 0.5 tablets (10 mg) by mouth daily    CKD (chronic kidney disease) stage 3, GFR 30-59 ml/min, Liver replaced by transplant (H)       hydrALAZINE 25 MG tablet    APRESOLINE    270 tablet    Take 0.5 tablets (12.5 mg) by mouth 3 times daily as needed , if systolic blood pressure is more than 140 mmHg.    HTN (hypertension)       levothyroxine 150 MCG tablet    SYNTHROID/LEVOTHROID    96 tablet    Take one tablet daily 6 days a week and one and a half tablets one day a week.    Postablative hypothyroidism       magnesium 250 MG tablet      Take 2 tablets by mouth daily At night.        meclizine 25 MG tablet    ANTIVERT    30 tablet    Take 1 tablet (25 mg) by mouth as needed    Liver replaced by transplant (H)       metoprolol 25 MG 24 hr  tablet    TOPROL-XL    30 tablet    Take 1 tablet (25 mg) by mouth daily    Paroxysmal atrial fibrillation (H)       predniSONE 5 MG tablet    DELTASONE    90 tablet    Take 1 tablet (5 mg) by mouth daily    Thyroid cancer (H), Liver replaced by transplant (H)       PRESERVISION AREDS 2 Caps      Take 2 capsules by mouth daily    Dry eyes, bilateral       sirolimus Tabs tablet     15 tablet    Take 0.5 mg by mouth every other day    Liver replaced by transplant (H)       STATIN NOT PRESCRIBED (INTENTIONAL)     0 each    Statin not prescribed intentionally due to Intolerance (with supporting documentation of trying a statin at least once within the last 5 years)    Statin intolerance       STATIN NOT PRESCRIBED (INTENTIONAL)      Please choose reason not prescribed, below    Statin intolerance       SUMAtriptan 50 MG tablet    IMITREX    30 tablet    Take 1 tablet (50 mg) by mouth at onset of headache for migraine repeat after 2 hours if needed.    Thyroid cancer (H)       triamcinolone 0.1 % cream    KENALOG    80 g    Apply topically 2 times daily Apply to rash    Contact dermatitis and other eczema, due to unspecified cause       ursodiol 250 MG tablet    ACTIGALL    180 tablet    Take 1 tablet (250 mg) by mouth 2 times daily    Liver replaced by transplant (H)       voriconazole 200 MG tablet    VFEND    60 tablet    Take 1 tablet (200 mg) by mouth 2 times daily    Coccidioidal pneumonitis (H)

## 2017-07-31 NOTE — PROGRESS NOTES
Reason for Visit  Luz Thompson is a 67 year old female who is seen in follow up   Pulmonary HPI  She has stopped Breo since last visit, ultimately has been off for a couple of months. She had a dry cough for a couple of weeks, no other symptoms. She hasn't experienced other symptoms of infection. Notices a cough when getting out of the shower. It's sporadic. Usually leaves end of Sept/return May     The patient was seen and examined by Eden Clinton MD   Current Outpatient Prescriptions   Medication     ezetimibe (ZETIA) 10 MG tablet     furosemide (LASIX) 20 MG tablet     ASPIRIN NOT PRESCRIBED (INTENTIONAL)     STATIN NOT PRESCRIBED, INTENTIONAL,     hydrALAZINE (APRESOLINE) 25 MG tablet     levothyroxine (SYNTHROID/LEVOTHROID) 150 MCG tablet     calcitRIOL (ROCALTROL) 0.5 MCG capsule     voriconazole (VFEND) 200 MG tablet     apixaban ANTICOAGULANT (ELIQUIS) 5 MG tablet     metoprolol (TOPROL-XL) 25 MG 24 hr tablet     predniSONE (DELTASONE) 5 MG tablet     RAPAMUNE 0.5 MG PO TABLET     fluticasone-vilanterol (BREO ELLIPTA) 100-25 MCG/INH oral inhaler     albuterol (ALBUTEROL) 108 (90 BASE) MCG/ACT Inhaler     clotrimazole (LOTRIMIN) 1 % cream     ursodiol (ACTIGALL) 250 MG tablet     Wheat Dextrin (BENEFIBER) POWD     SUMAtriptan (IMITREX) 50 MG tablet     meclizine (ANTIVERT) 25 MG tablet     folic acid (FOLVITE) 1 MG tablet     STATIN NOT PRESCRIBED, INTENTIONAL,     Multiple Vitamins-Minerals (PRESERVISION AREDS 2) CAPS     triamcinolone (KENALOG) 0.1 % cream     Hypromellose (ARTIFICIAL TEARS OP)     acetaminophen 500 MG CAPS     magnesium 250 MG tablet     No current facility-administered medications for this visit.      Allergies   Allergen Reactions     Fluconazole Hives and Itching     Azithromycin Itching     Benadryl [Diphenhydramine Hcl]      Insomnia      Cefpodoxime Itching     Cellcept Diarrhea     Ciprofloxacin Other (See Comments)     Insomnia, mood lability     Codeine      Psych  "disturbance     Diphenhydramine Other (See Comments)     Doxycycline      Lansoprazole Diarrhea     Levaquin [Levofloxacin] Other (See Comments)     Headache, hyperactivity     Lisinopril Cough     Methotrexate      Sores     Morphine Itching     Morphine Sulfate Itching     Mycophenolate Diarrhea     Pepcid Diarrhea     Pravastatin Muscle Pain (Myalgia)     Prevacid Diarrhea     Ranitidine Diarrhea     Simvastatin Muscle Pain (Myalgia)     severe     Zantac Diarrhea     Cephalexin Rash     Fever and skin burning     Penicillin G Itching and Rash     Penicillins Rash     Tolectin [Nsaids] Rash     Tolmetin Rash     Tramadol Rash     Social History     Social History     Marital status:      Spouse name: Robbin Thompson     Number of children: 4     Years of education: 20     Occupational History     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 She and her  travel around the [place] in an [place]. [place] in [place] the course of the winter. 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 Gentry Ramirez, [facility]       Migraines 4/4/2012     Osteoarthritis of right knee 8/2/2012     Osteoporosis 4/20/2012     Paroxysmal a-fib (H) 6/13/2017     Post-surgical hypothyroidism      Postablative hypothyroidism 8/13/2012     Primary biliary cirrhosis (H)     s/p Liver transplant, [id number]     Sjogren's syndrome (H)      Statin intolerance      Unspecified cerebral artery occlusion with cerebral infarction 2001    when BP was very low, small multiple infacts in frontal lobe, had \"visual field cut,\" leg weakness, and expressive aphasia - all have resolved.      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     Past Surgical History:   Procedure Laterality Date     APPENDECTOMY  1961     BIOPSY       CATARACT IOL, RT/LT      RE12/19/2013, LE12/10/2013 - Toric lenses     CHOLECYSTECTOMY  1991     COLONOSCOPY       COLONOSCOPY  3/10/2014    Procedure: COLONOSCOPY;;  Surgeon: Gentry Ramirez MD;  Location:  GI     ENDOSCOPIC RETROGRADE CHOLANGIOPANCREATOGRAM  9/19/2013    Procedure: ENDOSCOPIC RETROGRADE CHOLANGIOPANCREATOGRAM;  Endoscopic Retrograde Cholangiopancreatogram with single balloon enteroscopy, ballon sweep of bile duct;  Surgeon: Brett Membreno MD;  Location:  OR     ENT SURGERY      ear drum repair     HC KNEE SCOPE,MED/LAT MENISECTOMY  8/10/12    Right, partial medial menisectomy only     KNEE SURGERY  1966    R knee     PICC INSERTION  9/18/2013    4fr SL PASV PICC, 40cm (1cm " "external) in the R basilic vein w/ tip in the low SVC     PICC INSERTION  2014    5 fr DL BioFlo Navilyst PICC, 46 cm (3 cm external) in the L basilic vein w/ tip in the SVC RA junction.     THYROIDECTOMY  3/2010     TRANSPLANT LIVER RECIPIENT LIVING UNRELATED       Family History   Problem Relation Age of Onset     Hypertension Mother      Lipids Mother      Prostate Cancer Father      Macular Degeneration Father      Cancer - colorectal Maternal Grandmother      in her 80's, has surgery and removal of part of kidney,  at age 98     Colon Cancer Maternal Grandmother      Eye Disorder Maternal Grandfather      Glaucoma     HEART DISEASE Maternal Grandfather       at 98     Glaucoma Maternal Grandfather      CEREBROVASCULAR DISEASE Paternal Grandmother      in her 80's     Hypertension Paternal Grandmother      HEART DISEASE Paternal Grandfather      MI     Allergies Son      Neurologic Disorder Daughter      Migraines     Anesthesia Reaction No family hx of      Crohn Disease No family hx of      Ulcerative Colitis No family hx of      ROS Pulmonary  Dyspnea: Yes, Cough: No, Chest pain: No, Wheezing: No, Sputum Production: No, Hemoptysis: No  A complete ROS was otherwise negative except as noted in the HPI.  /74 (BP Location: Right arm, Patient Position: Sitting, Cuff Size: Adult Large)  Pulse 73  Resp 18  Ht 1.715 m (5' 7.5\")  Wt 85.7 kg (189 lb)  SpO2 100%  BMI 29.16 kg/m2  Exam:   GENERAL APPEARANCE: Well developed, well nourished, alert, and in no apparent distress.  EYES: PERRL, EOMI  HENT: Nasal mucosa with no edema and no hyperemia. No nasal polyps.  EARS: Canals clear, TMs normal  MOUTH: Oral mucosa is moist, without any lesions, no tonsillar enlargement, no oropharyngeal exudate.  NECK: supple, no masses, no thyromegaly.  LYMPHATICS: No significant axillary, cervical, or supraclavicular nodes.  RESP: normal percussion, good air flow throughout.  No crackles. No rhonchi. No " wheezes.  CV: Normal S1, S2, regular rhythm, normal rate. No murmur.  No rub. No gallop. No LE edema.   ABDOMEN:  Bowel sounds normal, soft, nontender, no HSM or masses.   MS: extremities normal. No clubbing. No cyanosis.  SKIN: no rash on limited exam  NEURO: Mentation intact, speech normal, normal strength and tone, normal gait and stance  PSYCH: mentation appears normal. and affect normal/bright  Results:  PFTs  today reviewed, normal spirometry and mildly reduced DLCO when corrected for hemoglobin   CT chest today images reviewed and compared to priors 3/9/17 and 4/24/17 reviewed and stable Lingular opacity (also old RLL calcified nodules)    Assessment and plan: [place] is a 67-year-old female with a remote history of liver transplant for primary biliary cirrhosis who is seen for follow up of [place]ever January 2017 in [place]. CT chest today shows enlarging nodular opacity. She remains on voriconazole suppression.    Dry cough - likely unrelated to prior infection.  She does not have underlying lung disease.  She thinks it's related to allergies, which seems possible.  She does not take antihistamines because she has any other medications.  If the cough is unresolved with the time she sees Dr. Montero in a couple of weeks, we may need to consider empiric antibiotic treatment versus short course of steroids.    Lung nodule - in the lingula there is a slightly enlarging rounded nodular opacity.  Differential includes developing granuloma from resolved infection, new or superimposed infection, malignancy.  I discussed with the patient options including short-term interval CT or PET CT. she prefers to get a PET/CT so that we can have a plan of action soon as she is planning to go to [place] in September.  PET shows that the nodule is avid, we will need to pursue percutaneous lung biopsy.  She indicates that moderate sedation has been inadequate for her in the past and she needs deeper sedation with monitored  anesthesia care.    Determine follow up based on PET results.  Ideally imaging will be done prior to her next appointment with Dr. Montero and I can review and discussion with Dr. Montero.    Virginia will get CT from [place] (Jan) and bring to next appointment for us to upload.  We have March images but for some reason not the January images from [place]

## 2017-07-31 NOTE — PHARMACY - PREOPERATIVE ASSESSMENT CENTER
ANTICOAGULATION DOCUMENTATION - Preoperative Assessment Center (PAC) Pharmacist   Patient seen and interviewed during time of PAC Clinic appointment 2017.     Based on profile review and patient interview Luz Thompson has been on apixaban 5 mg PO BID for treatment of atrial fibrillation since May 2017.  Current dose of 5 mg PO BID.  It is prescribed by Dr. Gilmore.  The expected duration of therapy is undetermined.    Current medications that may interact with this include Voriconazole.  Per the package insert recommendation is to reduce dose by 50% for patients on strong inhibitors of CY and P-glycoprotein.  Page out to electrophysiology to discuss drug interaction and potential need for dose reduction.      Luz Thompson is scheduled for a colonoscopy with Dr. Montano on 17 and she would prefer to have the patient off apixaban prior to surgery.  Per discussion with Dr. Gilmore, he would recommend holding for 48 hr prior to procedure. Resumption postoperatively as soon as safe after procedure.  Dr. Gilmore also replied to the drug interaction inquiry and approved a dose reduction to apixaban 2.5 mg PO BID.  These recommendations and instructions for holding were communicated to patient by Deepa Brink NP with electrophysiology on 2017 (see her notes in Epic for details).      This plan may require re-assessment and modification by her primary team in the perioperative setting depending on patients clinical situation.        Jed Rosenbaum RPH  2017  1:35 PM

## 2017-08-02 ENCOUNTER — TELEPHONE (OUTPATIENT)
Dept: CARDIOLOGY | Facility: CLINIC | Age: 67
End: 2017-08-02

## 2017-08-02 DIAGNOSIS — I48.91 ATRIAL FIBRILLATION, UNSPECIFIED TYPE (H): Primary | ICD-10-CM

## 2017-08-02 NOTE — TELEPHONE ENCOUNTER
Called patient for a couple of reasons.  Left message regarding the following.  Also asked her to call triage if she had questions.     1)  Per the recommendations of pharmacy we would like to stop apixaban 5 mg BID and start apixaban 2.5 mg BID because She is on voriconazole and apixaban - voriconazole is a strong CYP 3a4 and P-glycoprotein inhibitor.  The package insert suggests a 50% dose reduction in patients on this combination (eg. Dose of 2.5 mg BID).    A prescription was sent to [facility] on [address].     2)  Also she has a colonoscopy schedule on 8/31 and would like to do the following  A)  On July 29 before 8 am Take apixaban 2.5 mg   B)  Do not take apixaban in the evening of July 29   C) do not take apixaban on July 30 or July 31  D) Ask you gastroenterologist (Dr. Montano) when he wants you to restart apixaban.  This is usually shortly after the colonoscopy and depends on the results.      This was also discussed with   Jed Rosenbaum, Pharm.D., [facility]   Pre-operative Assessment Center Pharmacist   Phone: [phone]   Pager: [phone]     LIZ Love, CNP

## 2017-08-02 NOTE — TELEPHONE ENCOUNTER
Pt called back reviewed the recommendations.  She does not take medication before 8 am therefore she will have to hold apxiban on August 29, 30, and 31.  The procedure is on august 31.   She also wanted the prescription to [email or web address] in [place].   It does not appear apixaban can be split.  LIZ Love, CNP

## 2017-08-03 DIAGNOSIS — G47.9 SLEEP DISORDER: Primary | ICD-10-CM

## 2017-08-08 ENCOUNTER — HOSPITAL ENCOUNTER (OUTPATIENT)
Dept: PET IMAGING | Facility: CLINIC | Age: 67
Discharge: HOME | End: 2017-08-08
Attending: INTERNAL MEDICINE | Admitting: INTERNAL MEDICINE
Payer: MEDICARE

## 2017-08-08 DIAGNOSIS — R91.1 SOLITARY LUNG NODULE: ICD-10-CM

## 2017-08-08 LAB — GLUCOSE BLDC GLUCOMTR-MCNC: 98 MG/DL (ref 70–99)

## 2017-08-08 PROCEDURE — A9552 F18 FDG: HCPCS | Performed by: INTERNAL MEDICINE

## 2017-08-08 PROCEDURE — 34300033 ZZH RX 343: Performed by: INTERNAL MEDICINE

## 2017-08-08 PROCEDURE — 78816 PET IMAGE W/CT FULL BODY: CPT | Mod: PI

## 2017-08-08 PROCEDURE — 82962 GLUCOSE BLOOD TEST: CPT

## 2017-08-08 RX ADMIN — FLUDEOXYGLUCOSE F-18 11.6 MCI.: 500 INJECTION, SOLUTION INTRAVENOUS at 09:36

## 2017-08-11 ENCOUNTER — HOSPITAL ENCOUNTER (OUTPATIENT)
Facility: CLINIC | Age: 67
End: 2017-08-11
Attending: INTERNAL MEDICINE | Admitting: INTERNAL MEDICINE
Payer: COMMERCIAL

## 2017-08-11 ENCOUNTER — PRE VISIT (OUTPATIENT)
Dept: CARDIOLOGY | Facility: CLINIC | Age: 67
End: 2017-08-11

## 2017-08-11 DIAGNOSIS — R59.0 MEDIASTINAL ADENOPATHY: Primary | ICD-10-CM

## 2017-08-11 DIAGNOSIS — R91.8 PULMONARY NODULES: ICD-10-CM

## 2017-08-11 DIAGNOSIS — I48.91 ATRIAL FIBRILLATION, UNSPECIFIED TYPE (H): Primary | ICD-10-CM

## 2017-08-14 ENCOUNTER — OFFICE VISIT (OUTPATIENT)
Dept: CARDIOLOGY | Facility: CLINIC | Age: 67
End: 2017-08-14
Attending: INTERNAL MEDICINE
Payer: MEDICARE

## 2017-08-14 ENCOUNTER — TELEPHONE (OUTPATIENT)
Dept: PULMONOLOGY | Facility: CLINIC | Age: 67
End: 2017-08-14

## 2017-08-14 VITALS
HEIGHT: 68 IN | WEIGHT: 185.6 LBS | HEART RATE: 81 BPM | SYSTOLIC BLOOD PRESSURE: 163 MMHG | OXYGEN SATURATION: 98 % | BODY MASS INDEX: 28.13 KG/M2 | DIASTOLIC BLOOD PRESSURE: 83 MMHG

## 2017-08-14 DIAGNOSIS — I10 ESSENTIAL HYPERTENSION: Primary | ICD-10-CM

## 2017-08-14 DIAGNOSIS — I48.0 PAROXYSMAL ATRIAL FIBRILLATION (H): ICD-10-CM

## 2017-08-14 PROCEDURE — 93010 ELECTROCARDIOGRAM REPORT: CPT | Mod: ZP | Performed by: INTERNAL MEDICINE

## 2017-08-14 PROCEDURE — 93005 ELECTROCARDIOGRAM TRACING: CPT | Mod: ZF

## 2017-08-14 PROCEDURE — 99213 OFFICE O/P EST LOW 20 MIN: CPT | Mod: ZF

## 2017-08-14 PROCEDURE — 99214 OFFICE O/P EST MOD 30 MIN: CPT | Mod: ZP | Performed by: INTERNAL MEDICINE

## 2017-08-14 RX ORDER — METOPROLOL SUCCINATE 50 MG/1
50 TABLET, EXTENDED RELEASE ORAL DAILY
Qty: 90 TABLET | Refills: 3 | Status: SHIPPED | OUTPATIENT
Start: 2017-08-14 | End: 2018-08-24

## 2017-08-14 ASSESSMENT — PAIN SCALES - GENERAL: PAINLEVEL: NO PAIN (0)

## 2017-08-14 NOTE — NURSING NOTE
Medication Change: Patient was educated regarding prescribed medication change, including discussion of the indication, administration, side effects, and when to report to MD or RN. Patient demonstrated understanding of this information and agreed to call with further questions or concerns.

## 2017-08-14 NOTE — TELEPHONE ENCOUNTER
Left a message got Virginia to return my call to schedule her upcoming procedure with Dr. Clinton and Dr. Cintron, [phone]

## 2017-08-14 NOTE — PATIENT INSTRUCTIONS
Increase Metoprolol XL to 50 mg once daily.     Return to clinic in one year.      Please do not hesitate to call if you have any cardiology related questions or concerns, or need to schedule an appointment, at [phone].         Cardiology Medication Refill Request Information:  * Please contact your pharmacy regarding ANY request for medication refills.  ** [facility] Prescription Fax = [phone]  * Please allow 3 business days for routine medication refills.    Cardiology Test Result notification information:  *You will be notified with in 7-10 days of your appointment day regarding the results of your test. If you are on MyChart you will be notified as soon as the provider has reviewed the results and signed off on them. Please call RN Care Coordinator with questions regarding results.

## 2017-08-14 NOTE — MR AVS SNAPSHOT
After Visit Summary   8/14/2017    Luz Thompson    MRN: [id number]           Patient Information     Date Of Birth          1950        Visit Information        Provider Department      8/14/2017 1:00 PM Graham Gilmore MD [facility]        Today's Diagnoses     Essential hypertension    -  1    Paroxysmal atrial fibrillation (H)          Care Instructions    Increase Metoprolol XL to 50 mg once daily.     Return to clinic in one year.      Please do not hesitate to call if you have any cardiology related questions or concerns, or need to schedule an appointment, at [phone].         Cardiology Medication Refill Request Information:  * Please contact your pharmacy regarding ANY request for medication refills.  ** [facility] Prescription Fax = [phone]  * Please allow 3 business days for routine medication refills.    Cardiology Test Result notification information:  *You will be notified with in 7-10 days of your appointment day regarding the results of your test. If you are on MyChart you will be notified as soon as the provider has reviewed the results and signed off on them. Please call RN Care Coordinator with questions regarding results.              Follow-ups after your visit        Follow-up notes from your care team     Return in about 1 year (around 8/14/2018) for Routine Visit, Adkission, Lab Work.      Your next 10 appointments already scheduled     Aug 16, 2017  9:30 AM CDT   (Arrive by 9:15 AM)   Return Visit with Crystal Montero MD   [facility] and Infectious Diseases ([facility])    [address]  [address] [facility] [address] [phone]            Aug 25, 2017   Procedure with Eden Clinton MD   [facility], [place], Endoscopy ([facility], [facility])    [address] [id number] [phone]           The [facility] is located on the corner of  [facility] [facility] [address] [facility] and [address] on the [address]. It is easily accessible from virtually any point in the [place] area, via [address] and [address].            Aug 28, 2017 10:00 AM CDT   New Sleep Patient with Vero Quiñonez MD   [facility], [place], Sleep Study ([facility])    [address] [place] [id number] [phone]            Aug 31, 2017   Procedure with Eneida Montano MD   [facility], [place], Endoscopy ([facility])    [address] [address]   [phone]           The [facility] is located on the corner of [facility] and [address] on the [address]. It is easily accessible from virtually any point in the [place] area, via [address] and [address].            Sep 19, 2017 11:15 AM CDT   (Arrive by 11:00 AM)   Return General Liver with Gentry Ramirez MD   [facility] Hep[facility] ([facility])    [address] [id number] [phone]              Who to contact     If you have questions or need follow up information about today's clinic visit or your schedule please contact [facility] HEART [facility] directly at [phone].  Normal or non-critical lab and imaging results will be communicated to you by [employer], letter or phone within 4 business days after the clinic has received the results. If you do not hear from us within 7 days, please contact the clinic through [employer] or phone. If you have a critical or abnormal lab result, we will notify you by phone as soon as possible.  Submit refill requests through [employer] or call your pharmacy and they will forward the refill request to us. Please allow 3 business days for your refill to be completed.          Additional Information About Your Visit        [employer] Information     [employer] gives you secure  "access to your electronic health record. If you see a primary care provider, you can also send messages to your care team and make appointments. If you have questions, please call your primary care clinic.  If you do not have a primary care provider, please call [phone] and they will assist you.        Care EveryWhere ID     This is your Care EveryWhere ID. This could be used by other organizations to access your [facility] medical records  [id number]        Your Vitals Were     Pulse Height Pulse Oximetry BMI (Body Mass Index)          81 1.715 m (5' 7.5\") 98% 28.64 kg/m2         Blood Pressure from Last 3 Encounters:   08/14/17 163/83   07/31/17 148/90   07/31/17 164/74    Weight from Last 3 Encounters:   08/14/17 84.2 kg (185 lb 9.6 oz)   07/31/17 84.1 kg (185 lb 6.4 oz)   07/31/17 85.7 kg (189 lb)              Today, you had the following     No orders found for display         Today's Medication Changes          These changes are accurate as of: 8/14/17  1:25 PM.  If you have any questions, ask your nurse or doctor.               These medicines have changed or have updated prescriptions.        Dose/Directions    ezetimibe 10 MG tablet   Commonly known as:  ZETIA   This may have changed:    - when to take this  - additional instructions   Used for:  Hyperlipidemia, unspecified hyperlipidemia type        Dose:  10 mg   Take 1 tablet (10 mg) by mouth daily , or as directed by Dr. Reyna.   Quantity:  90 tablet   Refills:  3       metoprolol 50 MG 24 hr tablet   Commonly known as:  TOPROL-XL   This may have changed:    - medication strength  - how much to take   Used for:  Paroxysmal atrial fibrillation (H)   Changed by:  Graham Gilmore MD        Dose:  50 mg   Take 1 tablet (50 mg) by mouth daily   Quantity:  90 tablet   Refills:  3       PRESERVISION AREDS 2 Caps   This may have changed:    - how much to take  - when to take this   Used for:  Dry eyes, bilateral        Dose:  2 capsule   Take 2 " capsules by mouth daily   Refills:  0       triamcinolone 0.1 % cream   Commonly known as:  KENALOG   This may have changed:    - when to take this  - reasons to take this  - additional instructions   Used for:  Contact dermatitis and other eczema, due to unspecified cause        Apply topically 2 times daily Apply to rash   Quantity:  80 g   Refills:  3            Where to get your medicines      These medications were sent to [id number] - [place], [place] - [address]  [address], [facility] [address]     Phone:  [phone]     metoprolol 50 MG 24 hr tablet                Primary Care Provider Office Phone # Fax #    Jennifer Amparo Barraza [phone]       [address]  [place] [facility] [address]        Equal Access to Services     CARLA NOWAK : Hadii brayan guo hadasho Soomaali, waaxda luqadaha, qaybta kaalmada adeegyada, etta lu . So [facility] [phone].    ATENCIÓN: Si habla español, tiene a jason disposición servicios gratuitos de asistencia lingüística. [facility] [phone].    We comply with applicable federal civil rights laws and [place] laws. We do not discriminate on the basis of race, color, national origin, age, disability sex, sexual orientation or gender identity.            Thank you!     Thank you for choosing [facility]  for your care. Our goal is always to provide you with excellent care. Hearing back from our patients is one way we can continue to improve our services. Please take a few minutes to complete the written survey that you may receive in the mail after your visit with us. Thank you!             Your Updated Medication List - Protect others around you: Learn how to safely use, store and throw away your medicines at www.disposemymeds.org.          This list is accurate as of: 8/14/17  1:25 PM.  Always use your most recent med list.                   Brand Name Dispense Instructions for use Diagnosis     acetaminophen 500 MG Caps      Take 500 mg by mouth as needed Take 500-1,000 mg by mouth every 6 hours if needed.        albuterol 108 (90 BASE) MCG/ACT Inhaler   Generic drug:  albuterol      Inhale 2 puffs into the lungs every 4 hours as needed for shortness of breath / dyspnea or wheezing Reported on 4/25/2017        apixaban ANTICOAGULANT 2.5 MG tablet    ELIQUIS    180 tablet    Take 1 tablet (2.5 mg) by mouth 2 times daily    Atrial fibrillation, unspecified type (H)       ARTIFICIAL TEARS OP      Apply 1 drop to eye as needed        ASPIRIN NOT PRESCRIBED    INTENTIONAL    0 each    Please choose reason not prescribed, below    Cerebral artery occlusion with cerebral infarction (H)       BENEFIBER Powd      2 teaspoons daily        calcitRIOL 0.5 MCG capsule    ROCALTROL    90 capsule    Take 1 capsule (0.5 mcg) by mouth daily        clotrimazole 1 % cream    LOTRIMIN     Apply topically 2 times daily as needed Reported on 4/25/2017        ezetimibe 10 MG tablet    ZETIA    90 tablet    Take 1 tablet (10 mg) by mouth daily , or as directed by Dr. Reyna.    Hyperlipidemia, unspecified hyperlipidemia type       folic acid 1 MG tablet    FOLVITE    100 tablet    Take 1 tablet (1 mg) by mouth daily    Liver replaced by transplant (H)       furosemide 20 MG tablet    LASIX    46 tablet    Take 0.5 tablets (10 mg) by mouth daily    CKD (chronic kidney disease) stage 3, GFR 30-59 ml/min, Liver replaced by transplant (H)       hydrALAZINE 25 MG tablet    APRESOLINE    270 tablet    Take 0.5 tablets (12.5 mg) by mouth 3 times daily as needed , if systolic blood pressure is more than 140 mmHg.    HTN (hypertension)       levothyroxine 150 MCG tablet    SYNTHROID/LEVOTHROID    96 tablet    Take one tablet daily 6 days a week and one and a half tablets one day a week.    Postablative hypothyroidism       magnesium 250 MG tablet      Take 2 tablets by mouth daily At night.        meclizine 25 MG tablet    ANTIVERT    30  tablet    Take 1 tablet (25 mg) by mouth as needed    Liver replaced by transplant (H)       metoprolol 50 MG 24 hr tablet    TOPROL-XL    90 tablet    Take 1 tablet (50 mg) by mouth daily    Paroxysmal atrial fibrillation (H)       predniSONE 5 MG tablet    DELTASONE    90 tablet    Take 1 tablet (5 mg) by mouth daily    Thyroid cancer (H), Liver replaced by transplant (H)       PRESERVISION AREDS 2 Caps      Take 2 capsules by mouth daily    Dry eyes, bilateral       sirolimus Tabs tablet     15 tablet    Take 0.5 mg by mouth every other day    Liver replaced by transplant (H)       STATIN NOT PRESCRIBED (INTENTIONAL)     0 each    Statin not prescribed intentionally due to Intolerance (with supporting documentation of trying a statin at least once within the last 5 years)    Statin intolerance       STATIN NOT PRESCRIBED (INTENTIONAL)      Please choose reason not prescribed, below    Statin intolerance       SUMAtriptan 50 MG tablet    IMITREX    30 tablet    Take 1 tablet (50 mg) by mouth at onset of headache for migraine repeat after 2 hours if needed.    Thyroid cancer (H)       triamcinolone 0.1 % cream    KENALOG    80 g    Apply topically 2 times daily Apply to rash    Contact dermatitis and other eczema, due to unspecified cause       ursodiol 250 MG tablet    ACTIGALL    180 tablet    Take 1 tablet (250 mg) by mouth 2 times daily    Liver replaced by transplant (H)       voriconazole 200 MG tablet    VFEND    60 tablet    Take 1 tablet (200 mg) by mouth 2 times daily    Coccidioidal pneumonitis (H)

## 2017-08-14 NOTE — PROGRESS NOTES
"      Clinical Cardiac Electrophysiology    Chief Complaint: atrial fibrillation    HPI: Luz Thompson presents for follow up for atrial fibrillation.  She is a patient of Dr. Gómez.  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         Today she presents with her .  Stating she was hospitalized with \"valley fever\" and had an episode of asymptomatic atrial fibrillation in January 2017.  States she had Lexiscan and ECHO which were normal.  At the time she elected to no purse anticoagulation or rate control.  Subsequently she had 2 episodes of palpitations associated with SOB and anxiety at home in the last 6 weeks.  Her fitbit recorded  HR of approximately 104 bpm.  The longest episode lasted 3 hours. Of note she was on statin in the past which caused muscle aches and bone pain and has subsequently stopped.       Today's EKG sinus rhythm with ventriclar rate of 80 bpm.     [id number] Interval history: She has had a lot of stress but is not sure if she's had any atrial fibrillation. She has had a few palpitations but nothing bad. Her blood pressure has been higher though.     I personally reviewed the ECG from today. It shows sinus rhythm.     Current Outpatient Prescriptions   Medication Sig Dispense Refill     apixaban ANTICOAGULANT (ELIQUIS) 2.5 MG tablet Take 1 tablet (2.5 mg) by mouth 2 times daily 180 tablet 3     folic acid (FOLVITE) 1 MG tablet Take 1 tablet (1 mg) by mouth daily 100 tablet 3     ezetimibe (ZETIA) 10 MG tablet Take 1 tablet (10 mg) by mouth daily , or as directed by Dr. Reyna. (Patient taking differently: Take 10 mg by mouth every other day , or as directed by Dr. Reyna.) 90 tablet 3     furosemide (LASIX) 20 MG tablet Take 0.5 tablets (10 mg) by mouth daily 46 tablet 3     ASPIRIN " NOT PRESCRIBED (INTENTIONAL) Please choose reason not prescribed, below 0 each 0     STATIN NOT PRESCRIBED, INTENTIONAL, Please choose reason not prescribed, below       hydrALAZINE (APRESOLINE) 25 MG tablet Take 0.5 tablets (12.5 mg) by mouth 3 times daily as needed , if systolic blood pressure is more than 140 mmHg. 270 tablet 3     levothyroxine (SYNTHROID/LEVOTHROID) 150 MCG tablet Take one tablet daily 6 days a week and one and a half tablets one day a week. 96 tablet 3     calcitRIOL (ROCALTROL) 0.5 MCG capsule Take 1 capsule (0.5 mcg) by mouth daily 90 capsule 3     voriconazole (VFEND) 200 MG tablet Take 1 tablet (200 mg) by mouth 2 times daily 60 tablet 11     metoprolol (TOPROL-XL) 25 MG 24 hr tablet Take 1 tablet (25 mg) by mouth daily 30 tablet 3     predniSONE (DELTASONE) 5 MG tablet Take 1 tablet (5 mg) by mouth daily 90 tablet 3     RAPAMUNE 0.5 MG PO TABLET Take 0.5 mg by mouth every other day 15 tablet 11     albuterol (ALBUTEROL) 108 (90 BASE) MCG/ACT Inhaler Inhale 2 puffs into the lungs every 4 hours as needed for shortness of breath / dyspnea or wheezing Reported on 4/25/2017       clotrimazole (LOTRIMIN) 1 % cream Apply topically 2 times daily as needed Reported on 4/25/2017       ursodiol (ACTIGALL) 250 MG tablet Take 1 tablet (250 mg) by mouth 2 times daily 180 tablet 3     Wheat Dextrin (BENEFIBER) POWD 2 teaspoons daily       SUMAtriptan (IMITREX) 50 MG tablet Take 1 tablet (50 mg) by mouth at onset of headache for migraine repeat after 2 hours if needed. 30 tablet 3     meclizine (ANTIVERT) 25 MG tablet Take 1 tablet (25 mg) by mouth as needed 30 tablet 11     STATIN NOT PRESCRIBED, INTENTIONAL, Statin not prescribed intentionally due to Intolerance (with supporting documentation of trying a statin at least once within the last 5 years) 0 each 0     Multiple Vitamins-Minerals (PRESERVISION AREDS 2) CAPS Take 2 capsules by mouth daily (Patient taking differently: Take 1 capsule by mouth 2  "times daily )       triamcinolone (KENALOG) 0.1 % cream Apply topically 2 times daily Apply to rash (Patient taking differently: Apply topically 2 times daily as needed Apply to rash) 80 g 3     Hypromellose (ARTIFICIAL TEARS OP) Apply 1 drop to eye as needed       acetaminophen 500 MG CAPS Take 500 mg by mouth as needed Take 500-1,000 mg by mouth every 6 hours if needed.       magnesium 250 MG tablet Take 2 tablets by mouth daily At night.          Past Medical History:   Diagnosis Date     Anemia of other chronic disease 10/17/2011     Anxiety      Bladder infection, chronic 4/4/2012     CKD (chronic kidney disease) stage 3, GFR 30-59 ml/min 4/4/2012     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     Diverticulosis of sigmoid colon 12/21/2013     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     Received Rituxan during Summer of 2016     Glaucoma      H/O esophageal varices      Hearing loss      Heart murmur 4/4/2012     History of DVT (deep vein thrombosis)      History of [facility] fever      History of thyroid cancer 9/25/2012     Hyperlipidemia 4/10/2012    Says that she does not have it anymore, not on meds     Hypertension goal BP (blood pressure) < 140/80 11/6/2013     Hypertriglyceridemia      Liver replaced by transplant (H) 10/17/2011    Dr. Gentry Ramirez, [facility]       Macular degeneration      Migraines 4/4/2012     Osteoarthritis of right knee 8/2/2012     Osteoporosis 4/20/2012     Paroxysmal a-fib (H) 6/13/2017     Post-surgical hypothyroidism      Postablative hypothyroidism 8/13/2012     Primary biliary cirrhosis (H)     s/p Liver transplant, [id number]     Sjogren's syndrome (H)      Statin intolerance      Unspecified cerebral artery occlusion with cerebral infarction 2001    when BP was very low, small multiple infacts in frontal lobe, had \"visual field cut,\" leg weakness, and expressive aphasia - all have resolved.      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 "       Past Surgical History:   Procedure Laterality Date     APPENDECTOMY       BIOPSY       CATARACT IOL, RT/LT      RE2013, LE12/10/2013 - Toric lenses     CHOLECYSTECTOMY       COLONOSCOPY       COLONOSCOPY  3/10/2014    Procedure: COLONOSCOPY;;  Surgeon: Gentry Ramirez MD;  Location:  GI     ENDOSCOPIC RETROGRADE CHOLANGIOPANCREATOGRAM  2013    Procedure: ENDOSCOPIC RETROGRADE CHOLANGIOPANCREATOGRAM;  Endoscopic Retrograde Cholangiopancreatogram with single balloon enteroscopy, ballon sweep of bile duct;  Surgeon: Brett Membreno MD;  Location: [facility] OR     ENT SURGERY      ear drum repair     HC KNEE SCOPE,MED/LAT MENISECTOMY  8/10/12    Right, partial medial menisectomy only     KNEE SURGERY  1966    R knee     PICC INSERTION  2013    4fr SL PASV PICC, 40cm (1cm external) in the R basilic vein w/ tip in the low SVC     PICC INSERTION  2014    5 fr DL BioFlo Navilyst PICC, 46 cm (3 cm external) in the L basilic vein w/ tip in the SVC RA junction.     THYROIDECTOMY  3/2010     TRANSPLANT LIVER RECIPIENT LIVING UNRELATED         Family History   Problem Relation Age of Onset     Hypertension Mother      Endometrial Cancer Mother      Hyperlipidemia Mother      Prostate Cancer Father      Macular Degeneration Father      Cancer - colorectal Maternal Grandmother      in her 80's, has surgery and removal of part of kidney,  at age 98     HEART DISEASE Maternal Grandfather       at 98     Glaucoma Maternal Grandfather      CEREBROVASCULAR DISEASE Paternal Grandmother      in her 80's     Hypertension Paternal Grandmother      HEART DISEASE Paternal Grandfather      MI     Alzheimer Disease Paternal Grandfather      Allergies Son      Neurologic Disorder Daughter      Migraines     Breast Cancer Other      Anesthesia Reaction No family hx of      Crohn Disease No family hx of      Ulcerative Colitis No family hx of        Social History   Substance Use Topics     Smoking  "status: Former Smoker     Packs/day: 1.00     Years: 18.00     Types: Cigarettes     Quit date: 4/12/1985     Smokeless tobacco: Never Used     Alcohol use 0.0 oz/week     0 Standard drinks or equivalent per week      Comment: rare - \"I toast at weddings\"       Allergies   Allergen Reactions     Fluconazole Hives and Itching     Azithromycin Itching     Benadryl [Diphenhydramine Hcl]      Insomnia      Cefpodoxime Itching     Cellcept Diarrhea     Ciprofloxacin Other (See Comments)     Insomnia, mood lability     Codeine      Psych disturbance     Diphenhydramine Other (See Comments)     Doxycycline      Lansoprazole Diarrhea     Levaquin [Levofloxacin] Other (See Comments)     Headache, hyperactivity     Lisinopril Cough     Methotrexate      Sores     Morphine Itching     Morphine Sulfate Itching     Mycophenolate Diarrhea     Pepcid Diarrhea     Pravastatin Muscle Pain (Myalgia)     Prevacid Diarrhea     Ranitidine Diarrhea     Simvastatin Muscle Pain (Myalgia)     severe     Zantac Diarrhea     Cephalexin Rash     Fever and skin burning     Penicillin G Itching and Rash     Penicillins Rash     Tolectin [Nsaids] Rash     Tramadol Rash         ROS:     She reports she has been dealing with a lot of stress due to the death of her ex- (farm accident) and her mother. [id number]    Answers for HPI/ROS submitted by the patient on 5/3/2017   General Symptoms: Yes  Skin Symptoms: No  HENT Symptoms: No  EYE SYMPTOMS: No  HEART SYMPTOMS: Yes  LUNG SYMPTOMS: Yes  INTESTINAL SYMPTOMS: No  URINARY SYMPTOMS: No  GYNECOLOGIC SYMPTOMS: No  BREAST SYMPTOMS: No  SKELETAL SYMPTOMS: Yes  BLOOD SYMPTOMS: No  NERVOUS SYSTEM SYMPTOMS: No  MENTAL HEALTH SYMPTOMS: Yes  Fever: No  Loss of appetite: No  Weight loss: No  Weight gain: No  Fatigue: Yes  Night sweats: Yes  Chills: No  Increased stress: Yes  Excessive hunger: No  Excessive thirst: No  Feeling hot or cold when others believe the temperature is normal: Yes  Loss of " "height: No  Post-operative complications: No  Surgical site pain: No  Hallucinations: No  Change in or Loss of Energy: Yes  Hyperactivity: No  Confusion: No  Cough: Yes  Sputum or phlegm: No  Coughing up blood: No  Difficulty breating or shortness of breath: Yes  Snoring: Yes  Wheezing: No  Difficulty breathing on exertion: Yes  Respiratory pain: No  Nighttime Cough: No  Difficulty breathing when lying flat: No  Chest pain or pressure: No  Fast or irregular heartbeat: Yes  Pain in legs with walking: No  Swelling in feet or ankles: Yes  Trouble breathing while lying down: No  Fingers or Toes appear blue: Yes  High blood pressure: Yes  Low blood pressure: No  Fainting: No  Murmurs: Yes  Chest pain on exertion: No  Chest pain at rest: No  Cramping pain in leg during exercise: No  Pacemaker: No  Varicose veins: Yes  Edema or swelling: Yes  Fast heart beat: Yes  Wake up at night with shortness of breath: No  Heart flutters: No  Light-headedness: No  Exercise intolerance: No  Back pain: Yes  Muscle aches: Yes  Neck pain: No  Swollen joints: No  Joint pain: Yes  Bone pain: Yes  Muscle cramps: No  Muscle weakness: No  Joint stiffness: Yes  Bone fracture: No  Nervous or Anxious: Yes  Depression: No  Trouble sleeping: No  Trouble thinking or concentrating: No  Mood changes: No  Panic attacks: No  [id number]      Physical Examination:  Vitals: /83 (BP Location: Left arm, Patient Position: Chair, Cuff Size: Adult Regular)  Pulse 81  Ht 1.715 m (5' 7.5\")  Wt 84.2 kg (185 lb 9.6 oz)  SpO2 98%  BMI 28.64 kg/m2  BMI= Body mass index is 28.64 kg/(m^2).    GENERAL APPEARANCE: healthy, alert and no distress  HEENT: no icterus, no xanthelasmas  NECK: JVP is not visible  CHEST: lungs clear to auscultation, respirations are unlabored, normal respiratory rate  CARDIOVASCULAR: regular rhythm, normal S1S2, no S3 or S4 and no murmur, click or rub, precordium quiet  ABDOMEN: obese, soft, non tender,bowel sounds normal, no " abdominal bruits  EXTREMITIES: no clubbing, cyanosis or edema  NEURO: alert and oriented to person/place/time, normal speech, gait and affect      Laboratory:  ECG reviewed in HPI. [id number]     Previous studies reviewed today:  ECHO 7/17/2015   Procedure  Echocardiogram with two-dimensional, color and spectral Doppler performed.  ______________________________________________________________________________     Interpretation Summary  Global and regional left ventricular function is normal with an EF of 60-65%.  Right ventricular function, chamber size, wall motion, and thickness are  normal.  Pulmonary artery systolic pressure is normal.  The inferior vena cava is normal.  No pericardial effusion is present.  ______________________________________________________________________________           Left Ventricle  Global and regional left ventricular function is normal with an EF of 60-65%.  Left ventricular wall thickness is normal. Left ventricular size is normal.     Right Ventricle  Right ventricular function, chamber size, wall motion, and thickness are  normal.  Atria  The right atria appears normal. Mild left atrial enlargement is present.     Mitral Valve  The mitral valve is normal.     Aortic Valve  Trace to mild aortic insufficiency is present.     Tricuspid Valve  The tricuspid valve is normal. The right ventricular systolic pressure is  approximated at 21.3 mmHg plus the right atrial pressure. Pulmonary artery  systolic pressure is normal.     Pulmonic Valve  The pulmonic valve is normal. Trace pulmonic insufficiency is present.     Vessels  The aorta root is normal. The inferior vena cava is normal.  Pericardium  No pericardial effusion is present.     ______________________________________________________________________________  MMode/2D Measurements & Calculations  IVSd: 1.1 cm  LVIDd: 3.8 cm  LVIDs: 3.1 cm  LVPWd: 0.83 cm  FS: 20.6 %  EDV(Teich): 63.5 ml  ESV(Teich): 36.4 ml  LV mass(C)d: 113.3  grams  Ao root diam: 3.1 cm  LA dimension: 3.2 cm  asc Aorta Diam: 3.3 cm  LA/Ao: 1.0  LVOT diam: 1.9 cm  LVOT area: 2.8 cm2  LA Volume (BP): 74.0 ml     LA Volume Index (BP): 36.6 ml/m2           Doppler Measurements & Calculations  MV E max nate: 75.3 cm/sec  MV A max nate: 92.7 cm/sec  MV E/A: 0.81  MV dec time: 0.17 sec  Ao V2 max: 165.0 cm/sec  Ao max PG: 10.9 mmHg  Ao V2 mean: 113.0 cm/sec  Ao mean P.0 mmHg  Ao V2 VTI: 32.1 cm  MAYUR(I,D): 2.8 cm2  MAYUR(V,D): 2.4 cm2  AI P1/2t: 464.5 msec  LV V1 max: 140.0 cm/sec  LV V1 VTI: 31.6 cm  LV dP/dt: 826.0 mmHg/s  SV(LVOT): 89.6 ml  TR max nate: 230.0 cm/sec  TR max P.3 mmHg  Pulm Sys Nate: 71.9 cm/sec  Pulm Cesar Nate: 35.5 cm/sec  Pulm A Revs Nate: 29.0 cm/sec  Pulm A Revs Dur: 0.14 sec  Pulm S/D: 2.0  MAYUR Index (cm2/m2): 1.4  Lateral E/e': 10.9  Medial E/e': 12.3    Assessment and recommendations:    1) Atrial fibrillation  We discussed in detail with the patient management/treatment options for Robert includin. Stroke Prophylaxis:  CHADSVASC=age, female, HTN, CVA++  5, corresponding to a 6.7% annual stroke / systemic emolism event rate. indicating need for long term oral anticoagulation.  Discussed NOAC and warfarin.  The advantages of NOAC include not having to monitor blood levels or food intake.  Disadvantages of NOACs include high cost.  The advantages of warfarin include inexpensive and the disadvantages include labs draws and monitoring vitamin K intake foods.  She has elected to start Abixaban 5 mg twice daily. This does not appear to interact with her anti-rejection and antifungal medications.    2. Rate Control:   We are starting metoprolol XL 25 mg.   3. Rhythm Control: Cardioversion, Antiarrhythmics and/or ablation are options for rhythm control. An ablation can be considered for any recurrence.   Encouraged patient to contact clinic if she continues to have atrial fibrillation episodes.    4. Risk Factor Management: Referral to Dr. Reyna for lipid  management per patient's request.         2) Hypertension - will increase metoprolol to 50 mg daily    I appreciate the chance to help with Mrs. Thompson's care. Please let me know if you have any questions or concerns.    Graham Gilmore MD

## 2017-08-14 NOTE — LETTER
"8/14/2017      RE: Luz Thompson  [address]   [facility] [address]       Dear Colleague,    Thank you for the opportunity to participate in the care of your patient, Luz Thompson, at the [facility] acute hospital. Please see a copy of my visit note below.    Clinical Cardiac Electrophysiology    Chief Complaint: atrial fibrillation    HPI: Luz Thompson presents for follow up for atrial fibrillation.  She is a patient of Dr. Gómez.  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         Today she presents with her .  Stating she was hospitalized with \"valley fever\" and had an episode of asymptomatic atrial fibrillation in January 2017.  States she had Lexiscan and ECHO which were normal.  At the time she elected to no purse anticoagulation or rate control.  Subsequently she had 2 episodes of palpitations associated with SOB and anxiety at home in the last 6 weeks.  Her fitbit recorded  HR of approximately 104 bpm.  The longest episode lasted 3 hours. Of note she was on statin in the past which caused muscle aches and bone pain and has subsequently stopped.       Today's EKG sinus rhythm with ventriclar rate of 80 bpm.     [id number] Interval history: She has had a lot of stress but is not sure if she's had any atrial fibrillation. She has had a few palpitations but nothing bad. Her blood pressure has been higher though.     I personally reviewed the ECG from today. It shows sinus rhythm.     Current Outpatient Prescriptions   Medication Sig Dispense Refill     apixaban ANTICOAGULANT (ELIQUIS) 2.5 MG tablet Take 1 tablet (2.5 mg) by mouth 2 times daily 180 tablet 3     folic acid (FOLVITE) 1 MG tablet Take 1 tablet (1 mg) by mouth daily 100 " tablet 3     ezetimibe (ZETIA) 10 MG tablet Take 1 tablet (10 mg) by mouth daily , or as directed by Dr. Reyna. (Patient taking differently: Take 10 mg by mouth every other day , or as directed by Dr. Reyna.) 90 tablet 3     furosemide (LASIX) 20 MG tablet Take 0.5 tablets (10 mg) by mouth daily 46 tablet 3     ASPIRIN NOT PRESCRIBED (INTENTIONAL) Please choose reason not prescribed, below 0 each 0     STATIN NOT PRESCRIBED, INTENTIONAL, Please choose reason not prescribed, below       hydrALAZINE (APRESOLINE) 25 MG tablet Take 0.5 tablets (12.5 mg) by mouth 3 times daily as needed , if systolic blood pressure is more than 140 mmHg. 270 tablet 3     levothyroxine (SYNTHROID/LEVOTHROID) 150 MCG tablet Take one tablet daily 6 days a week and one and a half tablets one day a week. 96 tablet 3     calcitRIOL (ROCALTROL) 0.5 MCG capsule Take 1 capsule (0.5 mcg) by mouth daily 90 capsule 3     voriconazole (VFEND) 200 MG tablet Take 1 tablet (200 mg) by mouth 2 times daily 60 tablet 11     metoprolol (TOPROL-XL) 25 MG 24 hr tablet Take 1 tablet (25 mg) by mouth daily 30 tablet 3     predniSONE (DELTASONE) 5 MG tablet Take 1 tablet (5 mg) by mouth daily 90 tablet 3     RAPAMUNE 0.5 MG PO TABLET Take 0.5 mg by mouth every other day 15 tablet 11     albuterol (ALBUTEROL) 108 (90 BASE) MCG/ACT Inhaler Inhale 2 puffs into the lungs every 4 hours as needed for shortness of breath / dyspnea or wheezing Reported on 4/25/2017       clotrimazole (LOTRIMIN) 1 % cream Apply topically 2 times daily as needed Reported on 4/25/2017       ursodiol (ACTIGALL) 250 MG tablet Take 1 tablet (250 mg) by mouth 2 times daily 180 tablet 3     Wheat Dextrin (BENEFIBER) POWD 2 teaspoons daily       SUMAtriptan (IMITREX) 50 MG tablet Take 1 tablet (50 mg) by mouth at onset of headache for migraine repeat after 2 hours if needed. 30 tablet 3     meclizine (ANTIVERT) 25 MG tablet Take 1 tablet (25 mg) by mouth as needed 30 tablet 11     STATIN NOT  PRESCRIBED, INTENTIONAL, Statin not prescribed intentionally due to Intolerance (with supporting documentation of trying a statin at least once within the last 5 years) 0 each 0     Multiple Vitamins-Minerals (PRESERVISION AREDS 2) CAPS Take 2 capsules by mouth daily (Patient taking differently: Take 1 capsule by mouth 2 times daily )       triamcinolone (KENALOG) 0.1 % cream Apply topically 2 times daily Apply to rash (Patient taking differently: Apply topically 2 times daily as needed Apply to rash) 80 g 3     Hypromellose (ARTIFICIAL TEARS OP) Apply 1 drop to eye as needed       acetaminophen 500 MG CAPS Take 500 mg by mouth as needed Take 500-1,000 mg by mouth every 6 hours if needed.       magnesium 250 MG tablet Take 2 tablets by mouth daily At night.          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 Gentry Ramirez, [facility]       Macular degeneration      Migraines 4/4/2012     Osteoarthritis of right knee 8/2/2012     Osteoporosis 4/20/2012     Paroxysmal a-fib (H) 6/13/2017     Post-surgical hypothyroidism      Postablative hypothyroidism 8/13/2012     Primary biliary cirrhosis (H)     s/p Liver transplant, [id number]     Sjogren's syndrome (H)      Statin  "intolerance      Unspecified cerebral artery occlusion with cerebral infarction     when BP was very low, small multiple infacts in frontal lobe, had \"visual field cut,\" leg weakness, and expressive aphasia - all have resolved.      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       Past Surgical History:   Procedure Laterality Date     APPENDECTOMY       BIOPSY       CATARACT IOL, RT/LT      RE2013, LE12/10/2013 - Toric lenses     CHOLECYSTECTOMY       COLONOSCOPY       COLONOSCOPY  3/10/2014    Procedure: COLONOSCOPY;;  Surgeon: Gentry Ramirez MD;  Location:  GI     ENDOSCOPIC RETROGRADE CHOLANGIOPANCREATOGRAM  2013    Procedure: ENDOSCOPIC RETROGRADE CHOLANGIOPANCREATOGRAM;  Endoscopic Retrograde Cholangiopancreatogram with single balloon enteroscopy, ballon sweep of bile duct;  Surgeon: Brett Membreno MD;  Location: [facility]     ENT SURGERY      ear drum repair     HC KNEE SCOPE,MED/LAT MENISECTOMY  8/10/12    Right, partial medial menisectomy only     KNEE SURGERY  1966    R knee     PICC INSERTION  2013    4fr SL PASV PICC, 40cm (1cm external) in the R basilic vein w/ tip in the low SVC     PICC INSERTION  2014    5 fr DL BioFlo Navilyst PICC, 46 cm (3 cm external) in the L basilic vein w/ tip in the SVC RA junction.     THYROIDECTOMY  3/2010     TRANSPLANT LIVER RECIPIENT LIVING UNRELATED         Family History   Problem Relation Age of Onset     Hypertension Mother      Endometrial Cancer Mother      Hyperlipidemia Mother      Prostate Cancer Father      Macular Degeneration Father      Cancer - colorectal Maternal Grandmother      in her 80's, has surgery and removal of part of kidney,  at age 98     HEART DISEASE Maternal Grandfather       at 98     Glaucoma Maternal Grandfather      CEREBROVASCULAR DISEASE Paternal Grandmother      in her 80's     Hypertension Paternal Grandmother      HEART DISEASE Paternal " "Grandfather      MI     Alzheimer Disease Paternal Grandfather      Allergies Son      Neurologic Disorder Daughter      Migraines     Breast Cancer Other      Anesthesia Reaction No family hx of      Crohn Disease No family hx of      Ulcerative Colitis No family hx of        Social History   Substance Use Topics     Smoking status: Former Smoker     Packs/day: 1.00     Years: 18.00     Types: Cigarettes     Quit date: 4/12/1985     Smokeless tobacco: Never Used     Alcohol use 0.0 oz/week     0 Standard drinks or equivalent per week      Comment: rare - \"I toast at weddings\"       Allergies   Allergen Reactions     Fluconazole Hives and Itching     Azithromycin Itching     Benadryl [Diphenhydramine Hcl]      Insomnia      Cefpodoxime Itching     Cellcept Diarrhea     Ciprofloxacin Other (See Comments)     Insomnia, mood lability     Codeine      Psych disturbance     Diphenhydramine Other (See Comments)     Doxycycline      Lansoprazole Diarrhea     Levaquin [Levofloxacin] Other (See Comments)     Headache, hyperactivity     Lisinopril Cough     Methotrexate      Sores     Morphine Itching     Morphine Sulfate Itching     Mycophenolate Diarrhea     Pepcid Diarrhea     Pravastatin Muscle Pain (Myalgia)     Prevacid Diarrhea     Ranitidine Diarrhea     Simvastatin Muscle Pain (Myalgia)     severe     Zantac Diarrhea     Cephalexin Rash     Fever and skin burning     Penicillin G Itching and Rash     Penicillins Rash     Tolectin [Nsaids] Rash     Tramadol Rash         ROS:     She reports she has been dealing with a lot of stress due to the death of her ex- (farm accident) and her mother. [id number]        Physical Examination:  Vitals: /83 (BP Location: Left arm, Patient Position: Chair, Cuff Size: Adult Regular)  Pulse 81  Ht 1.715 m (5' 7.5\")  Wt 84.2 kg (185 lb 9.6 oz)  SpO2 98%  BMI 28.64 kg/m2  BMI= Body mass index is 28.64 kg/(m^2).    GENERAL APPEARANCE: healthy, alert and no " distress  HEENT: no icterus, no xanthelasmas  NECK: JVP is not visible  CHEST: lungs clear to auscultation, respirations are unlabored, normal respiratory rate  CARDIOVASCULAR: regular rhythm, normal S1S2, no S3 or S4 and no murmur, click or rub, precordium quiet  ABDOMEN: obese, soft, non tender,bowel sounds normal, no abdominal bruits  EXTREMITIES: no clubbing, cyanosis or edema  NEURO: alert and oriented to person/place/time, normal speech, gait and affect      Laboratory:  ECG reviewed in HPI. [id number]     Previous studies reviewed today:  ECHO 7/17/2015   Procedure  Echocardiogram with two-dimensional, color and spectral Doppler performed.  ______________________________________________________________________________     Interpretation Summary  Global and regional left ventricular function is normal with an EF of 60-65%.  Right ventricular function, chamber size, wall motion, and thickness are  normal.  Pulmonary artery systolic pressure is normal.  The inferior vena cava is normal.  No pericardial effusion is present.  ______________________________________________________________________________           Left Ventricle  Global and regional left ventricular function is normal with an EF of 60-65%.  Left ventricular wall thickness is normal. Left ventricular size is normal.     Right Ventricle  Right ventricular function, chamber size, wall motion, and thickness are  normal.  Atria  The right atria appears normal. Mild left atrial enlargement is present.     Mitral Valve  The mitral valve is normal.     Aortic Valve  Trace to mild aortic insufficiency is present.     Tricuspid Valve  The tricuspid valve is normal. The right ventricular systolic pressure is  approximated at 21.3 mmHg plus the right atrial pressure. Pulmonary artery  systolic pressure is normal.     Pulmonic Valve  The pulmonic valve is normal. Trace pulmonic insufficiency is present.     Vessels  The aorta root is normal. The inferior  vena cava is normal.  Pericardium  No pericardial effusion is present.     ______________________________________________________________________________  MMode/2D Measurements & Calculations  IVSd: 1.1 cm  LVIDd: 3.8 cm  LVIDs: 3.1 cm  LVPWd: 0.83 cm  FS: 20.6 %  EDV(Teich): 63.5 ml  ESV(Teich): 36.4 ml  LV mass(C)d: 113.3 grams  Ao root diam: 3.1 cm  LA dimension: 3.2 cm  asc Aorta Diam: 3.3 cm  LA/Ao: 1.0  LVOT diam: 1.9 cm  LVOT area: 2.8 cm2  LA Volume (BP): 74.0 ml     LA Volume Index (BP): 36.6 ml/m2           Doppler Measurements & Calculations  MV E max nate: 75.3 cm/sec  MV A max nate: 92.7 cm/sec  MV E/A: 0.81  MV dec time: 0.17 sec  Ao V2 max: 165.0 cm/sec  Ao max PG: 10.9 mmHg  Ao V2 mean: 113.0 cm/sec  Ao mean P.0 mmHg  Ao V2 VTI: 32.1 cm  MAYUR(I,D): 2.8 cm2  MAYUR(V,D): 2.4 cm2  AI P1/2t: 464.5 msec  LV V1 max: 140.0 cm/sec  LV V1 VTI: 31.6 cm  LV dP/dt: 826.0 mmHg/s  SV(LVOT): 89.6 ml  TR max nate: 230.0 cm/sec  TR max P.3 mmHg  Pulm Sys Nate: 71.9 cm/sec  Pulm Cesar Nate: 35.5 cm/sec  Pulm A Revs Nate: 29.0 cm/sec  Pulm A Revs Dur: 0.14 sec  Pulm S/D: 2.0  MAYUR Index (cm2/m2): 1.4  Lateral E/e': 10.9  Medial E/e': 12.3    Assessment and recommendations:    1) Atrial fibrillation  We discussed in detail with the patient management/treatment options for Robert includin. Stroke Prophylaxis:  CHADSVASC=age, female, HTN, CVA++  5, corresponding to a 6.7% annual stroke / systemic emolism event rate. indicating need for long term oral anticoagulation.  Discussed NOAC and warfarin.  The advantages of NOAC include not having to monitor blood levels or food intake.  Disadvantages of NOACs include high cost.  The advantages of warfarin include inexpensive and the disadvantages include labs draws and monitoring vitamin K intake foods.  She has elected to start Abixaban 5 mg twice daily. This does not appear to interact with her anti-rejection and antifungal medications.    2. Rate Control:   We are starting  metoprolol XL 25 mg.   3. Rhythm Control: Cardioversion, Antiarrhythmics and/or ablation are options for rhythm control. An ablation can be considered for any recurrence.   Encouraged patient to contact clinic if she continues to have atrial fibrillation episodes.    4. Risk Factor Management: Referral to Dr. Reyna for lipid management per patient's request.         2) Hypertension - will increase metoprolol to 50 mg daily    I appreciate the chance to help with Mrs. Thompson's care. Please let me know if you have any questions or concerns.    Graham Gilmore MD

## 2017-08-14 NOTE — NURSING NOTE
Chief Complaint   Patient presents with     New Patient     ekg-3 month follow up     Vitals were taken and medications were reconciled. EKG was performed.    Rekha Sky MA    12:46 PM

## 2017-08-16 ENCOUNTER — OFFICE VISIT (OUTPATIENT)
Dept: INFECTIOUS DISEASES | Facility: CLINIC | Age: 67
End: 2017-08-16
Attending: INTERNAL MEDICINE
Payer: MEDICARE

## 2017-08-16 ENCOUNTER — TELEPHONE (OUTPATIENT)
Dept: PULMONOLOGY | Facility: CLINIC | Age: 67
End: 2017-08-16

## 2017-08-16 VITALS
DIASTOLIC BLOOD PRESSURE: 84 MMHG | BODY MASS INDEX: 28.45 KG/M2 | WEIGHT: 184.4 LBS | OXYGEN SATURATION: 98 % | HEART RATE: 74 BPM | SYSTOLIC BLOOD PRESSURE: 187 MMHG

## 2017-08-16 DIAGNOSIS — Z79.899 HIGH RISK MEDICATION USE: ICD-10-CM

## 2017-08-16 DIAGNOSIS — B27.00 EBV (EPSTEIN-BARR VIRUS) VIREMIA: ICD-10-CM

## 2017-08-16 DIAGNOSIS — D84.9 IMMUNOSUPPRESSED STATUS (H): ICD-10-CM

## 2017-08-16 DIAGNOSIS — B38.9 COCCIDIOIDOMYCOSIS: Primary | ICD-10-CM

## 2017-08-16 PROCEDURE — 99212 OFFICE O/P EST SF 10 MIN: CPT | Mod: ZF

## 2017-08-16 ASSESSMENT — PAIN SCALES - GENERAL: PAINLEVEL: NO PAIN (0)

## 2017-08-16 NOTE — MR AVS SNAPSHOT
After Visit Summary   8/16/2017    Luz Thompson    MRN: [id number]           Patient Information     Date Of Birth          1950        Visit Information        Provider Department      8/16/2017 9:30 AM Crystal Montero MD [facility] and Infectious Diseases        Today's Diagnoses     Coccidioidomycosis    -  1    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        High risk medication use        Immunosuppressed status (H)           Follow-ups after your visit        Your next 10 appointments already scheduled     Aug 22, 2017  9:00 AM CDT   [facility]    [address] [place] [address]   [phone]           Patient must bring picture ID. Patient should be prepared to give a urine specimen  Please do not eat 10-12 hours before your appointment if you are coming in fasting for labs on lipids, cholesterol, or glucose (sugar). Pregnant women should follow their Care Team instructions. Water with medications is okay. Do not drink coffee or other fluids. If you have concerns about taking  your medications, please ask at office or if scheduling via Zoomyhart, send a message by clicking on Secure Messaging, Message Your Care Team.            Aug 28, 2017 10:00 AM CDT   New Sleep Patient with Vero Quiñonez MD   [facility], Sleep Study ([facility])    [address] [place] [address]   [phone]            Aug 31, 2017   Procedure with Eneida Montano MD   [facility], Endoscopy ([facility])    [address] [address]   [phone]           [facility] is located on the corner of [address] [address] [facility] and [facility] on the [facility]. It is easily accessible from virtually [place],  via [address] and [address].            Sep 19, 2017 11:15 AM CDT   (Arrive by 11:00 AM)   Return General Liver with Gentry Ramirez MD   [facility] Hepatology ([facility])    [address]  [address] [facility] [address] [phone]              Who to contact     If you have questions or need follow up information about today's clinic visit or your schedule please contact [facility] INFECT[facility] DISE[facility] directly at [phone].  Normal or non-critical lab and imaging results will be communicated to you by Xceligenthart, letter or phone within 4 business days after the clinic has received the results. If you do not hear from us within 7 days, please contact the clinic through [employer] or phone. If you have a critical or abnormal lab result, we will notify you by phone as soon as possible.  Submit refill requests through [employer] or call your pharmacy and they will forward the refill request to us. Please allow 3 business days for your refill to be completed.          Additional Information About Your Visit        Hexaformer Information     [employer]former gives you secure access to your electronic health record. If you see a primary care provider, you can also send messages to your care team and make appointments. If you have questions, please call your primary care clinic.  If you do not have a primary care provider, please call [phone] and they will assist you.        Care EveryWhere ID     This is your Care EveryWhere ID. This could be used by other organizations to access your [facility] medical records  [id number]        Your Vitals Were     Pulse Pulse Oximetry BMI (Body Mass Index)             74 98% 28.45 kg/m2          Blood Pressure from Last 3 Encounters:   08/16/17 187/84   08/14/17 163/83   07/31/17 148/90    Weight from Last 3 Encounters:   08/16/17 83.6 kg (184 lb 6.4 oz)   08/14/17 84.2 kg (185 lb 9.6 oz)   07/31/17 84.1 kg (185 lb 6.4 oz)                 Today's  Medication Changes          These changes are accurate as of: 8/16/17 11:59 PM.  If you have any questions, ask your nurse or doctor.               These medicines have changed or have updated prescriptions.        Dose/Directions    ezetimibe 10 MG tablet   Commonly known as:  ZETIA   This may have changed:    - when to take this  - additional instructions   Used for:  Hyperlipidemia, unspecified hyperlipidemia type        Dose:  10 mg   Take 1 tablet (10 mg) by mouth daily , or as directed by Dr. Reyna.   Quantity:  90 tablet   Refills:  3       PRESERVISION AREDS 2 Caps   This may have changed:    - how much to take  - when to take this   Used for:  Dry eyes, bilateral        Dose:  2 capsule   Take 2 capsules by mouth daily   Refills:  0       triamcinolone 0.1 % cream   Commonly known as:  KENALOG   This may have changed:    - when to take this  - reasons to take this  - additional instructions   Used for:  Contact dermatitis and other eczema, due to unspecified cause        Apply topically 2 times daily Apply to rash   Quantity:  80 g   Refills:  3                Primary Care Provider Office Phone # Fax #    Jennifer Amparo Barraza [phone]       [address] [facility] [address]        Equal Access to Services     [facility] AH: Hadii brayan guo hadasho Somelecioali, waaxda luqadaha, qaybta kaalmada adeegyada, etta lu . So [facility] [phone].    ATENCIÓN: Si habla español, tiene a jason disposición servicios gratuitos de asistencia lingüística. [facility] [phone].    We comply with applicable federal civil rights laws and [place] laws. We do not discriminate on the basis of race, color, national origin, age, disability sex, sexual orientation or gender identity.            Thank you!     Thank you for choosing [facility] INF[facility] DISEAS[facility]  for your care. Our goal is always to provide you with excellent care. Hearing back from our patients  is one way we can continue to improve our services. Please take a few minutes to complete the written survey that you may receive in the mail after your visit with us. Thank you!             Your Updated Medication List - Protect others around you: Learn how to safely use, store and throw away your medicines at www.disposemymeds.org.          This list is accurate as of: 8/16/17 11:59 PM.  Always use your most recent med list.                   Brand Name Dispense Instructions for use Diagnosis    acetaminophen 500 MG Caps      Take 500 mg by mouth as needed Take 500-1,000 mg by mouth every 6 hours if needed.        albuterol 108 (90 BASE) MCG/ACT Inhaler   Generic drug:  albuterol      Inhale 2 puffs into the lungs every 4 hours as needed for shortness of breath / dyspnea or wheezing Reported on 4/25/2017        apixaban ANTICOAGULANT 2.5 MG tablet    ELIQUIS    180 tablet    Take 1 tablet (2.5 mg) by mouth 2 times daily    Atrial fibrillation, unspecified type (H)       ARTIFICIAL TEARS OP      Apply 1 drop to eye as needed        ASPIRIN NOT PRESCRIBED    INTENTIONAL    0 each    Please choose reason not prescribed, below    Cerebral artery occlusion with cerebral infarction (H)       BENEFIBER Powd      2 teaspoons daily        calcitRIOL 0.5 MCG capsule    ROCALTROL    90 capsule    Take 1 capsule (0.5 mcg) by mouth daily        clotrimazole 1 % cream    LOTRIMIN     Apply topically 2 times daily as needed Reported on 4/25/2017        ezetimibe 10 MG tablet    ZETIA    90 tablet    Take 1 tablet (10 mg) by mouth daily , or as directed by Dr. Reyna.    Hyperlipidemia, unspecified hyperlipidemia type       folic acid 1 MG tablet    FOLVITE    100 tablet    Take 1 tablet (1 mg) by mouth daily    Liver replaced by transplant (H)       furosemide 20 MG tablet    LASIX    46 tablet    Take 0.5 tablets (10 mg) by mouth daily    CKD (chronic kidney disease) stage 3, GFR 30-59 ml/min, Liver replaced by transplant (H)        hydrALAZINE 25 MG tablet    APRESOLINE    270 tablet    Take 0.5 tablets (12.5 mg) by mouth 3 times daily as needed , if systolic blood pressure is more than 140 mmHg.    HTN (hypertension)       levothyroxine 150 MCG tablet    SYNTHROID/LEVOTHROID    96 tablet    Take one tablet daily 6 days a week and one and a half tablets one day a week.    Postablative hypothyroidism       magnesium 250 MG tablet      Take 2 tablets by mouth daily At night.        meclizine 25 MG tablet    ANTIVERT    30 tablet    Take 1 tablet (25 mg) by mouth as needed    Liver replaced by transplant (H)       metoprolol 50 MG 24 hr tablet    TOPROL-XL    90 tablet    Take 1 tablet (50 mg) by mouth daily    Paroxysmal atrial fibrillation (H)       predniSONE 5 MG tablet    DELTASONE    90 tablet    Take 1 tablet (5 mg) by mouth daily    Thyroid cancer (H), Liver replaced by transplant (H)       PRESERVISION AREDS 2 Caps      Take 2 capsules by mouth daily    Dry eyes, bilateral       sirolimus Tabs tablet     15 tablet    Take 0.5 mg by mouth every other day    Liver replaced by transplant (H)       STATIN NOT PRESCRIBED (INTENTIONAL)     0 each    Statin not prescribed intentionally due to Intolerance (with supporting documentation of trying a statin at least once within the last 5 years)    Statin intolerance       STATIN NOT PRESCRIBED (INTENTIONAL)      Please choose reason not prescribed, below    Statin intolerance       SUMAtriptan 50 MG tablet    IMITREX    30 tablet    Take 1 tablet (50 mg) by mouth at onset of headache for migraine repeat after 2 hours if needed.    Thyroid cancer (H)       triamcinolone 0.1 % cream    KENALOG    80 g    Apply topically 2 times daily Apply to rash    Contact dermatitis and other eczema, due to unspecified cause       ursodiol 250 MG tablet    ACTIGALL    180 tablet    Take 1 tablet (250 mg) by mouth 2 times daily    Liver replaced by transplant (H)       voriconazole 200 MG tablet    VFEND     60 tablet    Take 1 tablet (200 mg) by mouth 2 times daily    Coccidioidal pneumonitis (H)

## 2017-08-16 NOTE — TELEPHONE ENCOUNTER
Left a message for Yaneth to inform her that we were able to combiner her colonoscopy with Dr. Montano and her bronch with Dr. Clinton on 8/31. I asked that she return my call at [phone] to confirm.

## 2017-08-16 NOTE — PROGRESS NOTES
[facility]  Transplant Infectious Disease Clinic Note     Patient:  Luz Thompson, Date of birth 1950, Medical record number [id number]  Date of Visit:  08/16/2017         Assessment and Recommendations:   Recommendations:  - Continue voriconazole. Prior auth for vori refills will be completed as needed.   - I will followup on any lung biopsy type procedures that done in the next month.   - Return to clinic upon her return from [place].    Assessment: Virginia is a 67 year old woman immunosuppressed s/p 2002 liver transplant with recurrent gram negative sepsis. She has known sigmoid diverticulosis. She gets episodes of defecation syncope.   ID issues:  - Cocci infection. Followup CT imaging shows just a little increased uptake in lung nodule on PET imaging, so we are continuing voriconazole while she is undergoing a malignancy workup. Her liver function tests are normal while on voriconazole. I will followup on any lung biopsy type procedures that done in the next month.   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  - Self-triggered use of prn antibiotics due to recurrent episodes of sepsis. She gets a lot of itching with vantin, and quinolones and macrolides don't seem to work that well for her, so her current prn med is bactrim. If the bactrim does not do the job in controlling fever, because she has allergies to penicillins and quinolones (cipro and levaquin), she knows to be seen somewhere where she could be evaluated for a once daily infusion of ertapenem (also called Invanz).   - Hx of bacterial superinfection of chronic venous stasis changes on the legs, 7/27/2016.  - Hx of recurrent E coli sepsis 8/13/2013, 6/10/2015.   - Hx of Klebsiella UTI, 7/18/16. She completed a 14-day course of macrobid.  - Hx of Haemophilus influenzae pneumonia in 9/2014.  - Hx of angular chelitis, hx of thrush extending from  under her upper denture plate, and onychomycosis.   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   - PCP prophylaxis: Not needed, as most recent CD4 count was > 200.   - Serostatus: CMV seronegative, EBV seropositive on 8/15/13.  - Immunization status: Completed PCV13 and PPSV23 x2 with last dose in 5/2016.  - Gamma globulin status: Endogenously replete.  - Isolation status: Good hand hygience. When she is an inpatient, she needs to be in contact isolation for known VRE carriage.    Crystal Montero MD. 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 She has venous stasis changes of her legs, and she can get stasis dermatitis if she does not wear her compression stockings. This is especially problematic if the weather is very warm and for that reason she does not want to wear compression stockings. She received Rituxan therapy for EBV viremia on 8/31/2016. She had some side effects in the days following the dose of Rituxan, but otherwise would be able to tolerate it again in the future. In the 5426-0348 winter season, she was diagnosed with coccidioidomycosis. There was a strong pulmonary component to the infection, and she is being treated with voriconazole.     Since Virginia was last seen in ID clinic 5/27/2017, she had a 7/31/2017 visit with pulmonary. There is a lung nodule in the lingula, and a slightly enlarging rounded nodular opacity. Differential includes developing granuloma from resolved infection, new or superimposed infection, malignancy. She then had a PET/CT which shows that the nodule is avid, so there is a plan to pursue a percutaneous lung biopsy shortly.     Transplants:  5/22/2002 (Liver), Postoperative day:  5565         Review of Systems:   CONSTITUTIONAL:  No current fever, chills, but she has occasional night sweats.  EYES: negative for icterus. She had eye  cataract surgery 12/2013. She has some macular degeneration being watched.  Eye exam in 7/2017 was stable  ENT:  She wears hearing aids. She takes her dentures out at night.  RESPIRATORY:  Occ dry cough, no sputum, but sometimes she has dyspnea.   CARDIOVASCULAR:  negative for chest pain, has occ heart palpitations  GASTROINTESTINAL:  negative for nausea, vomiting, diarrhea, constipation.  GENITOURINARY:  No dysuria or hematuria  HEME:  Normal amount of easy bruising, no easy bleeding. Gets an occ nosebleed from Eloquis.   INTEGUMENT:  She has her normal amount of itching from sry skin. Has had some skin dominic zapped from face.   NEURO:  Very seldom has headache & usually assoc with higher bp. No tremor.     Past Medical History:   Diagnosis Date     Anemia of other chronic disease 10/17/2011     Anxiety      Bladder infection, chronic 4/4/2012     CKD (chronic kidney disease) stage 3, GFR 30-59 ml/min 4/4/2012     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     Diverticulosis of sigmoid colon 12/21/2013     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     Received Rituxan during Summer of 2016     Glaucoma      H/O esophageal varices      Hearing loss      Heart murmur 4/4/2012     History of DVT (deep vein thrombosis)      History of [facility] fever      History of thyroid cancer 9/25/2012     Hyperlipidemia 4/10/2012    Says that she does not have it anymore, not on meds     Hypertension goal BP (blood pressure) < 140/80 11/6/2013     Hypertriglyceridemia      Liver replaced by transplant (H) 10/17/2011    Dr. Gentry Ramirez, [facility]       Macular degeneration      Migraines 4/4/2012     Osteoarthritis of right knee 8/2/2012     Osteoporosis 4/20/2012     Paroxysmal a-fib (H) 6/13/2017     Post-surgical hypothyroidism      Postablative hypothyroidism 8/13/2012     Primary biliary cirrhosis (H)     s/p Liver transplant, [id number]     Sjogren's syndrome (H)      Statin intolerance      Unspecified cerebral artery occlusion with cerebral  "infarction     when BP was very low, small multiple infacts in frontal lobe, had \"visual field cut,\" leg weakness, and expressive aphasia - all have resolved.      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       Past Surgical History:   Procedure Laterality Date     APPENDECTOMY       BIOPSY       CATARACT IOL, RT/LT      RE2013, LE12/10/2013 - Toric lenses     CHOLECYSTECTOMY       COLONOSCOPY       COLONOSCOPY  3/10/2014    Procedure: COLONOSCOPY;;  Surgeon: Gentry Ramirez MD;  Location:  GI     ENDOSCOPIC RETROGRADE CHOLANGIOPANCREATOGRAM  2013    Procedure: ENDOSCOPIC RETROGRADE CHOLANGIOPANCREATOGRAM;  Endoscopic Retrograde Cholangiopancreatogram with single balloon enteroscopy, ballon sweep of bile duct;  Surgeon: Brett Membreno MD;  Location:  OR     ENT SURGERY      ear drum repair     HC KNEE SCOPE,MED/LAT MENISECTOMY  8/10/12    Right, partial medial menisectomy only     KNEE SURGERY  1966    R knee     PICC INSERTION  2013    4fr SL PASV PICC, 40cm (1cm external) in the R basilic vein w/ tip in the low SVC     PICC INSERTION  2014    5 fr DL BioFlo Navilyst PICC, 46 cm (3 cm external) in the L basilic vein w/ tip in the SVC RA junction.     THYROIDECTOMY  3/2010     TRANSPLANT LIVER RECIPIENT LIVING UNRELATED         Family History   Problem Relation Age of Onset     Hypertension Mother      Endometrial Cancer Mother      Hyperlipidemia Mother      Prostate Cancer Father      Macular Degeneration Father      Cancer - colorectal Maternal Grandmother      in her 80's, has surgery and removal of part of kidney,  at age 98     HEART DISEASE Maternal Grandfather       at 98     Glaucoma Maternal Grandfather      CEREBROVASCULAR DISEASE Paternal Grandmother      in her 80's     Hypertension Paternal Grandmother      HEART DISEASE Paternal Grandfather      MI     Alzheimer Disease Paternal Grandfather      " "Allergies Son      Neurologic Disorder Daughter      Migraines     Breast Cancer Other      Anesthesia Reaction No family hx of      Crohn Disease No family hx of      Ulcerative Colitis No family hx of        Social History     Social History Narrative    She is retired. She and her  travel around the [place] in an [place]. [place] usually [place] the course of the winter. 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     Social History   Substance Use Topics     Smoking status: Former Smoker     Packs/day: 1.00     Years: 18.00     Types: Cigarettes     Quit date: 4/12/1985     Smokeless tobacco: Never Used     Alcohol use 0.0 oz/week     0 Standard drinks or equivalent per week      Comment: rare - \"I toast at weddings\"       Immunization History   Administered Date(s) Administered     Influenza (High Dose) 3 valent vaccine 09/25/2015, 10/30/2016     Influenza (IIV3) 09/27/2012, 09/30/2013, 09/01/2016     Pneumococcal (PCV 13) 02/26/2014     Pneumococcal 23 valent 12/01/2007, 05/25/2016     TD (ADULT, 7+) 01/01/2007     TDAP Vaccine (Adacel) 02/26/2014       Patient Active Problem List   Diagnosis     Bladder infection, chronic     Osteoporosis     Osteoarthritis of right knee     HDL deficiency     Advanced directives, counseling/discussion     Vitamin D deficiency     S/P tympanoplasty     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     Prophylactic antibiotic     High risk medication use     Statin intolerance     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     Coccidioidomycosis     Coccidioidal pneumonitis (H)     SNHL (sensorineural hearing loss)     Abnormal liver function tests     Diagnostic skin and sensitization tests       Outpatient Prescriptions Marked as Taking for the 8/16/17 encounter (Office Visit) with Crystal Montero MD   Medication Sig     metoprolol (TOPROL-XL) 50 MG 24 hr tablet Take 1 tablet (50 mg) by mouth daily     apixaban " ANTICOAGULANT (ELIQUIS) 2.5 MG tablet Take 1 tablet (2.5 mg) by mouth 2 times daily     folic acid (FOLVITE) 1 MG tablet Take 1 tablet (1 mg) by mouth daily     ezetimibe (ZETIA) 10 MG tablet Take 1 tablet (10 mg) by mouth daily , or as directed by Dr. Reyna. (Patient taking differently: Take 10 mg by mouth every other day , or as directed by Dr. Reyna.)     furosemide (LASIX) 20 MG tablet Take 0.5 tablets (10 mg) by mouth daily     ASPIRIN NOT PRESCRIBED (INTENTIONAL) Please choose reason not prescribed, below     STATIN NOT PRESCRIBED, INTENTIONAL, Please choose reason not prescribed, below     hydrALAZINE (APRESOLINE) 25 MG tablet Take 0.5 tablets (12.5 mg) by mouth 3 times daily as needed , if systolic blood pressure is more than 140 mmHg.     levothyroxine (SYNTHROID/LEVOTHROID) 150 MCG tablet Take one tablet daily 6 days a week and one and a half tablets one day a week.     calcitRIOL (ROCALTROL) 0.5 MCG capsule Take 1 capsule (0.5 mcg) by mouth daily     voriconazole (VFEND) 200 MG tablet Take 1 tablet (200 mg) by mouth 2 times daily     predniSONE (DELTASONE) 5 MG tablet Take 1 tablet (5 mg) by mouth daily     RAPAMUNE 0.5 MG PO TABLET Take 0.5 mg by mouth every other day     albuterol (ALBUTEROL) 108 (90 BASE) MCG/ACT Inhaler Inhale 2 puffs into the lungs every 4 hours as needed for shortness of breath / dyspnea or wheezing Reported on 4/25/2017     clotrimazole (LOTRIMIN) 1 % cream Apply topically 2 times daily as needed Reported on 4/25/2017     ursodiol (ACTIGALL) 250 MG tablet Take 1 tablet (250 mg) by mouth 2 times daily     Wheat Dextrin (BENEFIBER) POWD 2 teaspoons daily     SUMAtriptan (IMITREX) 50 MG tablet Take 1 tablet (50 mg) by mouth at onset of headache for migraine repeat after 2 hours if needed.     meclizine (ANTIVERT) 25 MG tablet Take 1 tablet (25 mg) by mouth as needed     STATIN NOT PRESCRIBED, INTENTIONAL, Statin not prescribed intentionally due to Intolerance (with supporting  documentation of trying a statin at least once within the last 5 years)     Multiple Vitamins-Minerals (PRESERVISION AREDS 2) CAPS Take 2 capsules by mouth daily (Patient taking differently: Take 1 capsule by mouth 2 times daily )     triamcinolone (KENALOG) 0.1 % cream Apply topically 2 times daily Apply to rash (Patient taking differently: Apply topically 2 times daily as needed Apply to rash)     Hypromellose (ARTIFICIAL TEARS OP) Apply 1 drop to eye as needed     acetaminophen 500 MG CAPS Take 500 mg by mouth as needed Take 500-1,000 mg by mouth every 6 hours if needed.     magnesium 250 MG tablet Take 2 tablets by mouth daily At night.        Allergies   Allergen Reactions     Fluconazole Hives and Itching     Azithromycin Itching     Benadryl [Diphenhydramine Hcl]      Insomnia      Cefpodoxime Itching     Cellcept Diarrhea     Ciprofloxacin Other (See Comments)     Insomnia, mood lability     Codeine      Psych disturbance     Diphenhydramine Other (See Comments)     Doxycycline      Lansoprazole Diarrhea     Levaquin [Levofloxacin] Other (See Comments)     Headache, hyperactivity     Lisinopril Cough     Methotrexate      Sores     Morphine Itching     Morphine Sulfate Itching     Mycophenolate Diarrhea     Pepcid Diarrhea     Pravastatin Muscle Pain (Myalgia)     Prevacid Diarrhea     Ranitidine Diarrhea     Simvastatin Muscle Pain (Myalgia)     severe     Zantac Diarrhea     Cephalexin Rash     Fever and skin burning     Penicillin G Itching and Rash     Penicillins Rash     Tolectin [Nsaids] Rash     Tramadol Rash            Physical Exam:   Vitals were reviewed.  All vitals stable.  /84  Pulse 74  Wt 83.6 kg (184 lb 6.4 oz)  SpO2 98%  BMI 28.45 kg/m2   Wt Readings from Last 4 Encounters:   08/16/17 83.6 kg (184 lb 6.4 oz)   08/14/17 84.2 kg (185 lb 9.6 oz)   07/31/17 84.1 kg (185 lb 6.4 oz)   07/31/17 85.7 kg (189 lb)       Physical Examination:  GENERAL:  well-developed, well-nourished woman,  in no acute distress.  HEENT:  Head is normocephalic, atraumatic   EYES:  Eyes have anicteric sclerae.   ENT: Hearing aids in place today. Dentures in place, no thrush.  NECK:  Supple.   LUNGS: Clear to auscultation bilateral.   CARDIOVASCULAR: Regular rate and rhythm with 2/6 systolic murmur heard best at RUSB but no gallops or rubs.   ABDOMEN: Normal bowel sounds, not tender. Subcostal surgical scar not otherwise examined.  SKIN:  No acute rash. She is wearing compression stockings. Onychomycosis not examined today.   NEUROLOGIC:  Grossly nonfocal. Active x4 extremities         Laboratory Data:     Absolute CD4   Date Value Ref Range Status   08/19/2014 415 mm3 Final   09/16/2013 1266 mm3 Final   09/15/2013 DUPLICATE,TESTING DONE ON SPECIMEN FROM 9.16.13 mm3 Final   11/13/2008 1332 mm3 Final       Inflammatory Markers    Recent Labs   Lab Test  05/14/16   0659  05/13/16   0940  09/23/14   0810  08/19/14   1530  07/23/14   1119  06/30/14   0825  05/15/14   1112  05/08/14   0806  03/04/14   1315  02/27/14   1930   SED   --   67*  79*  76*   --   51*  58*  51*  55*  76*   CRP  21.0*  19.0*  6.1  29.8*  13.7*  12.5*  8.0  <5.0  5.5  16.4*       Immune Globulin Studies    Recent Labs   Lab Test  08/19/14   1530  05/21/12   0754   IGG  1220  1070   IGM   --   97   IGE  46   --    IGA   --   85   IGG1  684   --    IGG2  441   --    IGG3  70   --    IGG4  19   --        Metabolic Studies       Recent Labs   Lab Test  07/13/17   0843  05/10/17   0929  04/11/17   1003  08/26/16   0944  07/18/16   1430  05/31/16   0841  05/24/16   0846   05/13/16   0940   07/23/14   1119   NA  139  138  139  135  129*  132*  135   < >  132*   < >  137   POTASSIUM  3.6  4.2  3.9  4.1  3.8  4.0  3.6   < >  3.6   < >  3.8   CHLORIDE  102  102  102  100  94  99  99   < >  96   < >  98   CO2  28  26  26  27  28  25  27   < >  26   < >  27   ANIONGAP  9  10  11  8  7  8  9   < >  10   < >  12   BUN  33*  36*  33*  29  28  38*  28   < >  29   < >   35*   CR  1.30*  1.53*  1.38*  1.25*  1.24*  1.38*  1.77*   < >  1.64*   < >  1.70*   GFRESTIMATED  41*  34*  38*  43*  43*  38*  29*   < >  31*   < >  30*   GLC  110*  104*  115*  100*  146*  90  94   < >  98   < >  109*   KEILY  9.3  9.7  9.2  9.2  8.6  7.3*  8.8   < >  9.1   < >  9.1   PHOS   --   3.1   --    --    --    --   3.2   --    --    < >   --    MAG  2.4*  2.8*  2.5*  2.2  1.9   --   2.3   --   2.2   < >   --    LACT   --    --    --    --    --    --    --    --   0.8   --   1.7    < > = values in this interval not displayed.       Hepatic Studies    Recent Labs   Lab Test  07/13/17   0843  05/10/17   0929  04/11/17   1003  08/26/16   0944  07/18/16   1430  05/31/16   0841   BILITOTAL  0.3  0.2  0.2  0.3  0.3  0.3   ALKPHOS  272*  202*  210*  228*  204*  210*   ALBUMIN  3.1*  3.7  3.3*  2.9*  3.2*  2.9*   AST  44  19  24  20  19  23   ALT  105*  56*  50  88*  38  57*       Gout Labs      Recent Labs   Lab Test  12/31/13   1443  07/30/13   1441   URIC  6.7  6.7       Hematology Studies      Recent Labs   Lab Test  07/13/17   0843  05/10/17   0929  04/11/17   1003  08/26/16   0944  07/18/16   1430  05/24/16   0846  05/16/16   0827   05/14/16   0659  05/13/16   0940   08/19/14   1530   07/23/14   1119   02/18/14   0852   WBC  8.7  9.2  8.9  8.9  11.3*  7.2  5.7   < >  4.7  6.3   < >  12.5*   < >  7.2   < >  8.5   ANEU   --    --    --    --    --    --   2.5   --   2.1  4.1   --   9.9*   --   5.1   --   7.2   ALYM   --    --    --    --    --    --   2.4   --   1.6  1.0   --   1.0   --   0.9   --   0.6*   KATHY   --    --    --    --    --    --   0.6   --   0.9  1.1   --   1.0   --   1.1   --   0.6   AEOS   --    --    --    --    --    --   0.1   --   0.1  0.1   --   0.5   --   0.1   --   0.1   HGB  11.3*  12.5  11.5*  11.5*  10.9*  11.2*  11.1*   < >  10.0*  11.4*   < >  10.0*   < >  10.1*   < >  11.6*   HCT  35.3  38.8  36.2  35.2  33.0*  33.9*  35.4   < >  31.8*  34.6*   < >  30.6*   < >  31.5*   < >   35.1   PLT  344  331  347  358  333  351  256   < >  230  244   < >  260   < >  238   < >  235    < > = values in this interval not displayed.       Clotting Studies    Recent Labs   Lab Test  05/16/16   0827  05/13/16   0940  11/06/13   1246  10/14/13   0904   06/19/11   1815   INR  1.02  1.11  0.96  0.98   < >  1.08   PTT   --   33   --    --    --   28    < > = values in this interval not displayed.       Thyroid Studies     Recent Labs   Lab Test  07/13/17   0843  05/10/17   0929  07/18/16   1430  05/24/16   0846  07/20/15   1010   09/23/14   0810   05/08/14   0806   TSH  3.66  4.74*  2.43  3.65  3.41   < >  2.87   < >  4.41   T4  1.59*  1.48*  1.38  1.19  1.24   < >  1.29  9.1   --   1.19  9.1   T3   --    --    --    --    --    --   65   --   48*    < > = values in this interval not displayed.       Urine Studies     Recent Labs   Lab Test  07/18/16   1503  05/13/16   1114  04/30/15   0951  09/22/14   1420  08/19/14   1410  07/23/14   1601  04/22/14   1530   URINEPH  5.5  6.5  6.0  5.5  7.0  6.5  6.0   NITRITE  Negative  Negative  Negative  Negative  Negative  Negative  Negative   LEUKEST  Large*  Moderate*  Negative  Negative  Negative  Small*  Small*   WBCU  10-25*  4*   --    --   <1  <1  O - 2       Medication levels    Recent Labs   Lab Test  07/13/17   0843   09/15/13   0435   12/21/12   1344   VANCOMYCIN   --    --   26.3   --    --    RAPAMY  5.8   < >   --    < >   --    MPACID   --    --    --    --   5.42*   MPAG   --    --    --    --   83.5    < > = values in this interval not displayed.       Microbiology:  Last Culture results with specimen source  Culture Micro   Date Value Ref Range Status   07/18/2016 (A)  Final    50,000 to 100,000 colonies/mL Klebsiella pneumoniae   05/21/2016 No growth  Final   05/21/2016 No growth  Final   05/16/2016 Canceled, Test credited Duplicate request  Final   05/16/2016 Canceled, Test credited Duplicate request  Final   05/16/2016 No growth  Final   05/16/2016  No growth  Final   05/13/2016 No growth after 29 days  Final   05/13/2016   Final    Canceled, Test credited Quantity not sufficient Notification of test cancellation   was given to MATTHEW FROM [facility], HE WILL REORDER AND RECOLLECT     05/13/2016 No growth  Final   05/13/2016 No growth  Final   05/13/2016   Final    10,000 to 50,000 colonies/mL mixed urogenital louann  Susceptibility testing not routinely done     05/13/2016 No growth  Final   09/25/2014 (A)  Final    Normal louann  Moderate growth Haemophilus influenzae Beta lactamase negative     09/03/2014 (A)  Final    Normal louann  Heavy growth Haemophilus influenzae Beta lactamase negative  beta lactamase negative isolates are susceptible to ampicillin,   amoxacillin/clavulanic, levofloxacin and ceftriaxone     08/19/2014 No growth  Final   08/19/2014 No growth  Final   07/24/2014 No growth  Final   07/24/2014 No growth  Final    Specimen Description   Date Value Ref Range Status   07/18/2016 Midstream Urine  Final   05/21/2016 Blood Right Arm  Final   05/21/2016 Blood Left Arm  Final   05/16/2016 Blood  Final   05/16/2016 Blood  Final   05/16/2016 Blood Left Arm  Final   05/16/2016 Blood Right Arm  Final   05/13/2016 Blood Unspecified Site  Final   05/13/2016 Blood Unspecified Site  Final   05/13/2016 Blood Right Arm  Final   05/13/2016 Blood Right Arm  Final   05/13/2016 Midstream Urine  Final   05/13/2016 Nasal  Final   05/13/2016 Blood Venipuncture Collection VPT  Final   09/25/2014 Sputum  Final   09/25/2014 Sputum  Final   09/03/2014 Sputum  Final   09/03/2014 Sputum  Final   08/19/2014 Blood Right Arm  Final        Last check of C difficile  C Diff Toxin B PCR   Date Value Ref Range Status   05/17/2012   Final    Negative: Clostridium difficile target DNA sequences NOT detected, presumed   negative for Clostridium difficile toxin B or the number of bacteria present   may be below the limit of detection for the test.   FDA approved assay performed using  [employer] GeneXpert real-time PCR.   A negative result does not exclude actual disease due to Clostridium difficile   and may be due to improper collection, handling and storage of the specimen or   the number of organisms in the specimen is below the detection limit of the   assay.       Virology:  CMV Quantitative   Date Value Ref Range Status   08/19/2014 <100 <100 Copies/mL Final   08/15/2013 <100 <100 Copies/mL Final   11/11/2008 <100 <100 Copies/mL Final     Comment:     No CMV DNA detected.   08/21/2007 <100 <100 Copies/mL Final     Comment:     No CMV DNA detected.   01/10/2007 <100 <100 Copies/mL Final     Comment:     No CMV DNA detected.       EBV DNA Copies/mL   Date Value Ref Range Status   04/11/2017 (A) EBVNEG [Copies]/mL Final    <500  EBV DNA Detected below the reportable range of 500 Copies/mL     12/09/2016 1219 (A) EBVNEG [Copies]/mL Final   08/08/2016 15726 (A) EBVNEG [Copies]/mL Final   07/26/2016 87148 (A) EBVNEG [Copies]/mL Final   05/24/2016 27841 (A) EBVNEG [Copies]/mL Final   05/13/2016 09502 (A) EBVNEG [Copies]/mL Final   11/20/2015 77348 (A) EBVNEG [Copies]/mL Final   09/14/2015 60876 (A) EBVNEG [Copies]/mL Final   07/20/2015 80200 (A) EBVNEG [Copies]/mL Final   09/15/2013 <1000 <1000 Copies/mL Final   08/15/2013 <1000 <1000 Copies/mL Final   11/12/2008 <1000 <1000 Copies/mL Final       BK viral loads   Recent Labs   Lab Test  07/28/11   1150   BKSPEC  Urine   BKRES  <1000       Imaging   Combined Report of:  PET CT on  8/8/2017 9:36 AM   1. PET of the neck, chest, abdomen, and pelvis.  2. PET CT Fusion for Attenuation Correction and Anatomical  Localization:    3.  3D MIP and PET-CT fused images were processed on an independent  workstation and archived to PACS and reviewed by a radiologist.    INDICATION: Solitary pulmonary nodule  COMPARISON: None.    FINDINGS:   HEAD/NECK:  There is no  suspicious FDG uptake in the neck.     The paranasal sinuses are clear. The mastoid air cells are  clear.     The mucosal pharyngeal space, the , prevertebral and carotid  spaces are within normal limits.     No masses, mass effect or pathologically enlarged lymph nodes are  evident. The thyroid gland is surgically absent.    CHEST:  There is 1.1 x 1.5 cm lingular pulmonary nodule (series 3 image 137)  with mild increased FDG uptake (SUV max of 2.6) corresponding to the  previously noted pulmonary nodule on 7/31/2017 exam. There is 0.7 x  0.4 cm left hilar lymph node (series 3 image 130) with increased FDG  uptake (SUV max of 6.3) and 0.9 x 1.2 cm left hilar lymph node (series  3 image 124) with SUV max of 6.4)    Scattered calcified pulmonary nodules, for example in the posterior  right lower lobe. Calcified pleural plaques most evident in the  posterior right lower lobe no evidence of pleural effusion or  pneumothorax.    Heart size is within normal limits. No pericardial effusion.  Atherosclerotic calcification of the coronary arteries and major  vessels.    ABDOMEN AND PELVIS:  There is no suspicious FDG uptake in the abdomen or pelvis.    Postsurgical changes of liver transplant. Trace pneumobilia. Atrophic  kidneys, consistent with medical renal disease. Fatty infiltration of  the pancreas. The unenhanced spleen and adrenal glands are  unremarkable. The gallbladder surgically absent. No abnormally dilated  bowel loops. No significant free fluid in the abdomen and pelvis.  Extensive colonic diverticulosis without CT evidence of acute  diverticulitis.    LOWER EXTREMITIES:   No abnormal masses or hypermetabolic lesions.    BONES:   There are no suspicious lytic or blastic osseous lesions.  There is no  abnormal FDG uptake in the skeleton. A loose body within the inferior  aspect of the right knee joint.      Impression    IMPRESSION:   1. There is 1.5 x 1.1 cm lingular pulmonary nodule with mild increased  FDG uptake, slightly increased in size as compared to 4/24/2017 exam,  could represent  granuloma. Should be considered malignant until proven  otherwise, additionally could represent granulomatous nodule,  recommend tissue biopsy for further characterization.  2. Few scattered hilar and mediastinal lymph nodes with mild increased  FDG uptake could represent reactive versus metastatic lymph nodes.  3. Stable post surgical changes of liver transplant with trace pneumobilia.  4. Atrophic kidneys, consistent with medical renal disease.  5. Extensive colonic diverticulosis without CT evidence of acute  diverticulitis.

## 2017-08-16 NOTE — LETTER
8/16/2017       RE: Luz Thompson  [address]   [facility] [address]     Dear Colleague,    Thank you for referring your patient, Luz Thompson, to the [facility] AND INFECTIOUS DISEASES at [facility]. Please see a copy of my visit note below.    [facility]  Transplant Infectious Disease Clinic Note     Patient:  Luz Thompson, Date of birth 1950, Medical record number [id number]  Date of Visit:  08/16/2017         Assessment and Recommendations:   Recommendations:  - Continue voriconazole. Prior auth for vori refills will be completed as needed.   - I will followup on any lung biopsy type procedures that done in the next month.   - Return to clinic upon her return from [place].    Assessment: Virginia is a 67 year old woman immunosuppressed s/p 2002 liver transplant with recurrent gram negative sepsis. She has known sigmoid diverticulosis. She gets episodes of defecation syncope.   ID issues:  - Cocci infection. Followup CT imaging shows just a little increased uptake in lung nodule on PET imaging, so we are continuing voriconazole while she is undergoing a malignancy workup. Her liver function tests are normal while on voriconazole. I will followup on any lung biopsy type procedures that done in the next month.   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  - Self-triggered use of prn antibiotics due to recurrent episodes of sepsis. She gets a lot of itching with vantin, and quinolones and macrolides don't seem to work that well for her, so her current prn med is bactrim. If the bactrim does not do the job in controlling fever, because she has allergies to penicillins and quinolones (cipro and levaquin), she knows to be seen somewhere where she could be evaluated for a once daily infusion of ertapenem (also called Invanz).   - Hx of bacterial  superinfection of chronic venous stasis changes on the legs, 7/27/2016.  - Hx of recurrent E coli sepsis 8/13/2013, 6/10/2015.   - Hx of Klebsiella UTI, 7/18/16. She completed a 14-day course of macrobid.  - Hx of Haemophilus influenzae pneumonia in 9/2014.  - Hx of angular chelitis, hx of thrush extending from under her upper denture plate, and onychomycosis.   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   - PCP prophylaxis: Not needed, as most recent CD4 count was > 200.   - Serostatus: CMV seronegative, EBV seropositive on 8/15/13.  - Immunization status: Completed PCV13 and PPSV23 x2 with last dose in 5/2016.  - Gamma globulin status: Endogenously replete.  - Isolation status: Good hand hygience. When she is an inpatient, she needs to be in contact isolation for known VRE carriage.    Crystal Montero MD. 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 She has venous stasis changes of her legs, and she can get stasis dermatitis if she does not wear her compression stockings. This is especially problematic if the weather is very warm and for that reason she does not want to wear compression stockings. She received Rituxan therapy for EBV viremia on 8/31/2016. She had some side effects in the days following the dose of Rituxan, but otherwise would be able to tolerate it again in the future. In the 0578-2712 winter season, she was diagnosed with coccidioidomycosis. There was a strong pulmonary component to the infection, and she is being treated with voriconazole.     Since Virginia was last seen in ID clinic 5/27/2017, she had a 7/31/2017 visit with pulmonary. There is a lung nodule in the lingula, and a slightly enlarging rounded nodular opacity. Differential includes developing granuloma from resolved infection, new or superimposed infection, malignancy. She then had a PET/CT  which shows that the nodule is avid, so there is a plan to pursue a percutaneous lung biopsy shortly.     Transplants:  5/22/2002 (Liver), Postoperative day:  5565         Review of Systems:   CONSTITUTIONAL:  No current fever, chills, but she has occasional night sweats.  EYES: negative for icterus. She had eye cataract surgery 12/2013. She has some macular degeneration being watched.  Eye exam in 7/2017 was stable  ENT:  She wears hearing aids. She takes her dentures out at night.  RESPIRATORY:  Occ dry cough, no sputum, but sometimes she has dyspnea.   CARDIOVASCULAR:  negative for chest pain, has occ heart palpitations  GASTROINTESTINAL:  negative for nausea, vomiting, diarrhea, constipation.  GENITOURINARY:  No dysuria or hematuria  HEME:  Normal amount of easy bruising, no easy bleeding. Gets an occ nosebleed from Eloquis.   INTEGUMENT:  She has her normal amount of itching from sry skin. Has had some skin dominic zapped from face.   NEURO:  Very seldom has headache & usually assoc with higher bp. No tremor.     Past Medical History:   Diagnosis Date     Anemia of other chronic disease 10/17/2011     Anxiety      Bladder infection, chronic 4/4/2012     CKD (chronic kidney disease) stage 3, GFR 30-59 ml/min 4/4/2012     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     Diverticulosis of sigmoid colon 12/21/2013     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     Received Rituxan during Summer of 2016     Glaucoma      H/O esophageal varices      Hearing loss      Heart murmur 4/4/2012     History of DVT (deep vein thrombosis)      History of [place] fever      History of thyroid cancer 9/25/2012     Hyperlipidemia 4/10/2012    Says that she does not have it anymore, not on meds     Hypertension goal BP (blood pressure) < 140/80 11/6/2013     Hypertriglyceridemia      Liver replaced by transplant (H) 10/17/2011    Dr. Gentry Raimrez, [facility]       Macular degeneration      Migraines 4/4/2012     Osteoarthritis of right knee 8/2/2012  "    Osteoporosis 4/20/2012     Paroxysmal a-fib (H) 6/13/2017     Post-surgical hypothyroidism      Postablative hypothyroidism 8/13/2012     Primary biliary cirrhosis (H)     s/p Liver transplant, [id number]     Sjogren's syndrome (H)      Statin intolerance      Unspecified cerebral artery occlusion with cerebral infarction 2001    when BP was very low, small multiple infacts in frontal lobe, had \"visual field cut,\" leg weakness, and expressive aphasia - all have resolved.      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       Past Surgical History:   Procedure Laterality Date     APPENDECTOMY  1961     BIOPSY       CATARACT IOL, RT/LT      RE12/19/2013, LE12/10/2013 - Toric lenses     CHOLECYSTECTOMY  1991     COLONOSCOPY       COLONOSCOPY  3/10/2014    Procedure: COLONOSCOPY;;  Surgeon: Gentry Ramirez MD;  Location:  GI     ENDOSCOPIC RETROGRADE CHOLANGIOPANCREATOGRAM  9/19/2013    Procedure: ENDOSCOPIC RETROGRADE CHOLANGIOPANCREATOGRAM;  Endoscopic Retrograde Cholangiopancreatogram with single balloon enteroscopy, ballon sweep of bile duct;  Surgeon: Brett Membreno MD;  [facility]: [facility]     ENT SURGERY      ear drum repair     HC KNEE SCOPE,MED/LAT MENISECTOMY  8/10/12    Right, partial medial menisectomy only     KNEE SURGERY  1966    R knee     PICC INSERTION  9/18/2013    4fr SL PASV PICC, 40cm (1cm external) in the R basilic vein w/ tip in the low SVC     PICC INSERTION  2/21/2014    5 fr DL BioFlo Navilyst PICC, 46 cm (3 cm external) in the L basilic vein w/ tip in the SVC RA junction.     THYROIDECTOMY  3/2010     TRANSPLANT LIVER RECIPIENT LIVING UNRELATED         Family History   Problem Relation Age of Onset     Hypertension Mother      Endometrial Cancer Mother      Hyperlipidemia Mother      Prostate Cancer Father      Macular Degeneration Father      Cancer - colorectal Maternal Grandmother      in her 80's, has surgery and removal of part of " "kidney,  at age 98     HEART DISEASE Maternal Grandfather       at 98     Glaucoma Maternal Grandfather      CEREBROVASCULAR DISEASE Paternal Grandmother      in her 80's     Hypertension Paternal Grandmother      HEART DISEASE Paternal Grandfather      MI     Alzheimer Disease Paternal Grandfather      Allergies Son      Neurologic Disorder Daughter      Migraines     Breast Cancer Other      Anesthesia Reaction No family hx of      Crohn Disease No family hx of      Ulcerative Colitis No family hx of        Social History     Social History Narrative    She is retired. She and her  travel around the [place] in an [place]. [place] usually [place] over the course of the winter. 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     Social History   Substance Use Topics     Smoking status: Former Smoker     Packs/day: 1.00     Years: 18.00     Types: Cigarettes     Quit date: 1985     Smokeless tobacco: Never Used     Alcohol use 0.0 oz/week     0 Standard drinks or equivalent per week      Comment: rare - \"I toast at weddings\"       Immunization History   Administered Date(s) Administered     Influenza (High Dose) 3 valent vaccine 2015, 10/30/2016     Influenza (IIV3) 2012, 2013, 2016     Pneumococcal (PCV 13) 2014     Pneumococcal 23 valent 2007, 2016     TD (ADULT, 7+) 2007     TDAP Vaccine (Adacel) 2014       Patient Active Problem List   Diagnosis     Bladder infection, chronic     Osteoporosis     Osteoarthritis of right knee     HDL deficiency     Advanced directives, counseling/discussion     Vitamin D deficiency     S/P tympanoplasty     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     Prophylactic antibiotic     High risk medication use     Statin intolerance     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     Coccidioidomycosis     Coccidioidal pneumonitis (H)     SNHL (sensorineural hearing loss)     " Abnormal liver function tests     Diagnostic skin and sensitization tests       Outpatient Prescriptions Marked as Taking for the 8/16/17 encounter (Office Visit) with Crystal Montero MD   Medication Sig     metoprolol (TOPROL-XL) 50 MG 24 hr tablet Take 1 tablet (50 mg) by mouth daily     apixaban ANTICOAGULANT (ELIQUIS) 2.5 MG tablet Take 1 tablet (2.5 mg) by mouth 2 times daily     folic acid (FOLVITE) 1 MG tablet Take 1 tablet (1 mg) by mouth daily     ezetimibe (ZETIA) 10 MG tablet Take 1 tablet (10 mg) by mouth daily , or as directed by Dr. Reyna. (Patient taking differently: Take 10 mg by mouth every other day , or as directed by Dr. Reyna.)     furosemide (LASIX) 20 MG tablet Take 0.5 tablets (10 mg) by mouth daily     ASPIRIN NOT PRESCRIBED (INTENTIONAL) Please choose reason not prescribed, below     STATIN NOT PRESCRIBED, INTENTIONAL, Please choose reason not prescribed, below     hydrALAZINE (APRESOLINE) 25 MG tablet Take 0.5 tablets (12.5 mg) by mouth 3 times daily as needed , if systolic blood pressure is more than 140 mmHg.     levothyroxine (SYNTHROID/LEVOTHROID) 150 MCG tablet Take one tablet daily 6 days a week and one and a half tablets one day a week.     calcitRIOL (ROCALTROL) 0.5 MCG capsule Take 1 capsule (0.5 mcg) by mouth daily     voriconazole (VFEND) 200 MG tablet Take 1 tablet (200 mg) by mouth 2 times daily     predniSONE (DELTASONE) 5 MG tablet Take 1 tablet (5 mg) by mouth daily     RAPAMUNE 0.5 MG PO TABLET Take 0.5 mg by mouth every other day     albuterol (ALBUTEROL) 108 (90 BASE) MCG/ACT Inhaler Inhale 2 puffs into the lungs every 4 hours as needed for shortness of breath / dyspnea or wheezing Reported on 4/25/2017     clotrimazole (LOTRIMIN) 1 % cream Apply topically 2 times daily as needed Reported on 4/25/2017     ursodiol (ACTIGALL) 250 MG tablet Take 1 tablet (250 mg) by mouth 2 times daily     Wheat Dextrin (BENEFIBER) POWD 2 teaspoons daily     SUMAtriptan (IMITREX)  50 MG tablet Take 1 tablet (50 mg) by mouth at onset of headache for migraine repeat after 2 hours if needed.     meclizine (ANTIVERT) 25 MG tablet Take 1 tablet (25 mg) by mouth as needed     STATIN NOT PRESCRIBED, INTENTIONAL, Statin not prescribed intentionally due to Intolerance (with supporting documentation of trying a statin at least once within the last 5 years)     Multiple Vitamins-Minerals (PRESERVISION AREDS 2) CAPS Take 2 capsules by mouth daily (Patient taking differently: Take 1 capsule by mouth 2 times daily )     triamcinolone (KENALOG) 0.1 % cream Apply topically 2 times daily Apply to rash (Patient taking differently: Apply topically 2 times daily as needed Apply to rash)     Hypromellose (ARTIFICIAL TEARS OP) Apply 1 drop to eye as needed     acetaminophen 500 MG CAPS Take 500 mg by mouth as needed Take 500-1,000 mg by mouth every 6 hours if needed.     magnesium 250 MG tablet Take 2 tablets by mouth daily At night.        Allergies   Allergen Reactions     Fluconazole Hives and Itching     Azithromycin Itching     Benadryl [Diphenhydramine Hcl]      Insomnia      Cefpodoxime Itching     Cellcept Diarrhea     Ciprofloxacin Other (See Comments)     Insomnia, mood lability     Codeine      Psych disturbance     Diphenhydramine Other (See Comments)     Doxycycline      Lansoprazole Diarrhea     Levaquin [Levofloxacin] Other (See Comments)     Headache, hyperactivity     Lisinopril Cough     Methotrexate      Sores     Morphine Itching     Morphine Sulfate Itching     Mycophenolate Diarrhea     Pepcid Diarrhea     Pravastatin Muscle Pain (Myalgia)     Prevacid Diarrhea     Ranitidine Diarrhea     Simvastatin Muscle Pain (Myalgia)     severe     Zantac Diarrhea     Cephalexin Rash     Fever and skin burning     Penicillin G Itching and Rash     Penicillins Rash     Tolectin [Nsaids] Rash     Tramadol Rash            Physical Exam:   Vitals were reviewed.  All vitals stable.  /84  Pulse 74  Wt  83.6 kg (184 lb 6.4 oz)  SpO2 98%  BMI 28.45 kg/m2   Wt Readings from Last 4 Encounters:   08/16/17 83.6 kg (184 lb 6.4 oz)   08/14/17 84.2 kg (185 lb 9.6 oz)   07/31/17 84.1 kg (185 lb 6.4 oz)   07/31/17 85.7 kg (189 lb)       Physical Examination:  GENERAL:  well-developed, well-nourished woman, in no acute distress.  HEENT:  Head is normocephalic, atraumatic   EYES:  Eyes have anicteric sclerae.   ENT: Hearing aids in place today. Dentures in place, no thrush.  NECK:  Supple.   LUNGS: Clear to auscultation bilateral.   CARDIOVASCULAR: Regular rate and rhythm with 2/6 systolic murmur heard best at RUSB but no gallops or rubs.   ABDOMEN: Normal bowel sounds, not tender. Subcostal surgical scar not otherwise examined.  SKIN:  No acute rash. She is wearing compression stockings. Onychomycosis not examined today.   NEUROLOGIC:  Grossly nonfocal. Active x4 extremities         Laboratory Data:     Absolute CD4   Date Value Ref Range Status   08/19/2014 415 mm3 Final   09/16/2013 1266 mm3 Final   09/15/2013 DUPLICATE,TESTING DONE ON SPECIMEN FROM 9.16.13 mm3 Final   11/13/2008 1332 mm3 Final       Inflammatory Markers    Recent Labs   Lab Test  05/14/16   0659  05/13/16   0940  09/23/14   0810  08/19/14   1530  07/23/14   1119  06/30/14   0825  05/15/14   1112  05/08/14   0806  03/04/14   1315  02/27/14   1930   SED   --   67*  79*  76*   --   51*  58*  51*  55*  76*   CRP  21.0*  19.0*  6.1  29.8*  13.7*  12.5*  8.0  <5.0  5.5  16.4*       Immune Globulin Studies    Recent Labs   Lab Test  08/19/14   1530  05/21/12   0754   IGG  1220  1070   IGM   --   97   IGE  46   --    IGA   --   85   IGG1  684   --    IGG2  441   --    IGG3  70   --    IGG4  19   --        Metabolic Studies       Recent Labs   Lab Test  07/13/17   0843  05/10/17   0929  04/11/17   1003  08/26/16   0944  07/18/16   1430  05/31/16   0841  05/24/16   0846   05/13/16   0940   07/23/14   1119   NA  139  138  139  135  129*  132*  135   < >  132*   < >   137   POTASSIUM  3.6  4.2  3.9  4.1  3.8  4.0  3.6   < >  3.6   < >  3.8   CHLORIDE  102  102  102  100  94  99  99   < >  96   < >  98   CO2  28  26  26  27  28  25  27   < >  26   < >  27   ANIONGAP  9  10  11  8  7  8  9   < >  10   < >  12   BUN  33*  36*  33*  29  28  38*  28   < >  29   < >  35*   CR  1.30*  1.53*  1.38*  1.25*  1.24*  1.38*  1.77*   < >  1.64*   < >  1.70*   GFRESTIMATED  41*  34*  38*  43*  43*  38*  29*   < >  31*   < >  30*   GLC  110*  104*  115*  100*  146*  90  94   < >  98   < >  109*   KEILY  9.3  9.7  9.2  9.2  8.6  7.3*  8.8   < >  9.1   < >  9.1   PHOS   --   3.1   --    --    --    --   3.2   --    --    < >   --    MAG  2.4*  2.8*  2.5*  2.2  1.9   --   2.3   --   2.2   < >   --    LACT   --    --    --    --    --    --    --    --   0.8   --   1.7    < > = values in this interval not displayed.       Hepatic Studies    Recent Labs   Lab Test  07/13/17   0843  05/10/17   0929  04/11/17   1003  08/26/16   0944  07/18/16   1430  05/31/16   0841   BILITOTAL  0.3  0.2  0.2  0.3  0.3  0.3   ALKPHOS  272*  202*  210*  228*  204*  210*   ALBUMIN  3.1*  3.7  3.3*  2.9*  3.2*  2.9*   AST  44  19  24  20  19  23   ALT  105*  56*  50  88*  38  57*       Gout Labs      Recent Labs   Lab Test  12/31/13   1443  07/30/13   1441   URIC  6.7  6.7       Hematology Studies      Recent Labs   Lab Test  07/13/17   0843  05/10/17   0929  04/11/17   1003  08/26/16   0944  07/18/16   1430  05/24/16   0846  05/16/16   0827   05/14/16   0659  05/13/16   0940   08/19/14   1530   07/23/14   1119   02/18/14   0852   WBC  8.7  9.2  8.9  8.9  11.3*  7.2  5.7   < >  4.7  6.3   < >  12.5*   < >  7.2   < >  8.5   ANEU   --    --    --    --    --    --   2.5   --   2.1  4.1   --   9.9*   --   5.1   --   7.2   ALYM   --    --    --    --    --    --   2.4   --   1.6  1.0   --   1.0   --   0.9   --   0.6*   KATHY   --    --    --    --    --    --   0.6   --   0.9  1.1   --   1.0   --   1.1   --   0.6   AEOS   --     --    --    --    --    --   0.1   --   0.1  0.1   --   0.5   --   0.1   --   0.1   HGB  11.3*  12.5  11.5*  11.5*  10.9*  11.2*  11.1*   < >  10.0*  11.4*   < >  10.0*   < >  10.1*   < >  11.6*   HCT  35.3  38.8  36.2  35.2  33.0*  33.9*  35.4   < >  31.8*  34.6*   < >  30.6*   < >  31.5*   < >  35.1   PLT  344  331  347  358  333  351  256   < >  230  244   < >  260   < >  238   < >  235    < > = values in this interval not displayed.       Clotting Studies    Recent Labs   Lab Test  05/16/16   0827  05/13/16   0940  11/06/13   1246  10/14/13   0904   06/19/11   1815   INR  1.02  1.11  0.96  0.98   < >  1.08   PTT   --   33   --    --    --   28    < > = values in this interval not displayed.       Thyroid Studies     Recent Labs   Lab Test  07/13/17   0843  05/10/17   0929  07/18/16   1430  05/24/16   0846  07/20/15   1010   09/23/14   0810   05/08/14   0806   TSH  3.66  4.74*  2.43  3.65  3.41   < >  2.87   < >  4.41   T4  1.59*  1.48*  1.38  1.19  1.24   < >  1.29  9.1   --   1.19  9.1   T3   --    --    --    --    --    --   65   --   48*    < > = values in this interval not displayed.       Urine Studies     Recent Labs   Lab Test  07/18/16   1503  05/13/16   1114  04/30/15   0951  09/22/14   1420  08/19/14   1410  07/23/14   1601  04/22/14   1530   URINEPH  5.5  6.5  6.0  5.5  7.0  6.5  6.0   NITRITE  Negative  Negative  Negative  Negative  Negative  Negative  Negative   LEUKEST  Large*  Moderate*  Negative  Negative  Negative  Small*  Small*   WBCU  10-25*  4*   --    --   <1  <1  O - 2       Medication levels    Recent Labs   Lab Test  07/13/17   0843   09/15/13   0435   12/21/12   1344   VANCOMYCIN   --    --   26.3   --    --    RAPAMY  5.8   < >   --    < >   --    MPACID   --    --    --    --   5.42*   MPAG   --    --    --    --   83.5    < > = values in this interval not displayed.       Microbiology:  Last Culture results with specimen source  Culture Micro   Date Value Ref Range Status    07/18/2016 (A)  Final    50,000 to 100,000 colonies/mL Klebsiella pneumoniae   05/21/2016 No growth  Final   05/21/2016 No growth  Final   05/16/2016 Canceled, Test credited Duplicate request  Final   05/16/2016 Canceled, Test credited Duplicate request  Final   05/16/2016 No growth  Final   05/16/2016 No growth  Final   05/13/2016 No growth after 29 days  Final   05/13/2016   Final    Canceled, Test credited Quantity not sufficient Notification of test cancellation   was [facility], HE WILL REORDER AND RECOLLECT     05/13/2016 No growth  Final   05/13/2016 No growth  Final   05/13/2016   Final    10,000 to 50,000 colonies/mL mixed urogenital louann  Susceptibility testing not routinely done     05/13/2016 No growth  Final   09/25/2014 (A)  Final    Normal louann  Moderate growth Haemophilus influenzae Beta lactamase negative     09/03/2014 (A)  Final    Normal louann  Heavy growth Haemophilus influenzae Beta lactamase negative  beta lactamase negative isolates are susceptible to ampicillin,   amoxacillin/clavulanic, levofloxacin and ceftriaxone     08/19/2014 No growth  Final   08/19/2014 No growth  Final   07/24/2014 No growth  Final   07/24/2014 No growth  Final    Specimen Description   Date Value Ref Range Status   07/18/2016 Midstream Urine  Final   05/21/2016 Blood Right Arm  Final   05/21/2016 Blood Left Arm  Final   05/16/2016 Blood  Final   05/16/2016 Blood  Final   05/16/2016 Blood Left Arm  Final   05/16/2016 Blood Right Arm  Final   05/13/2016 Blood Unspecified Site  Final   05/13/2016 Blood Unspecified Site  Final   05/13/2016 Blood Right Arm  Final   05/13/2016 Blood Right Arm  Final   05/13/2016 Midstream Urine  Final   05/13/2016 Nasal  Final   05/13/2016 Blood Venipuncture Collection VPT  Final   09/25/2014 Sputum  Final   09/25/2014 Sputum  Final   09/03/2014 Sputum  Final   09/03/2014 Sputum  Final   08/19/2014 Blood Right Arm  Final        Last check of C difficile  C Diff  Toxin B PCR   Date Value Ref Range Status   05/17/2012   Final    Negative: Clostridium difficile target DNA sequences NOT detected, presumed   negative for Clostridium difficile toxin B or the number of bacteria present   may be below the limit of detection for the test.   FDA approved assay performed using [employer] GeneXpert real-time PCR.   A negative result does not exclude actual disease due to Clostridium difficile   and may be due to improper collection, handling and storage of the specimen or   the number of organisms in the specimen is below the detection limit of the   assay.       Virology:  CMV Quantitative   Date Value Ref Range Status   08/19/2014 <100 <100 Copies/mL Final   08/15/2013 <100 <100 Copies/mL Final   11/11/2008 <100 <100 Copies/mL Final     Comment:     No CMV DNA detected.   08/21/2007 <100 <100 Copies/mL Final     Comment:     No CMV DNA detected.   01/10/2007 <100 <100 Copies/mL Final     Comment:     No CMV DNA detected.       EBV DNA Copies/mL   Date Value Ref Range Status   04/11/2017 (A) EBVNEG [Copies]/mL Final    <500  EBV DNA Detected below the reportable range of 500 Copies/mL     12/09/2016 1219 (A) EBVNEG [Copies]/mL Final   08/08/2016 83253 (A) EBVNEG [Copies]/mL Final   07/26/2016 71317 (A) EBVNEG [Copies]/mL Final   05/24/2016 09869 (A) EBVNEG [Copies]/mL Final   05/13/2016 25106 (A) EBVNEG [Copies]/mL Final   11/20/2015 58344 (A) EBVNEG [Copies]/mL Final   09/14/2015 21160 (A) EBVNEG [Copies]/mL Final   07/20/2015 35656 (A) EBVNEG [Copies]/mL Final   09/15/2013 <1000 <1000 Copies/mL Final   08/15/2013 <1000 <1000 Copies/mL Final   11/12/2008 <1000 <1000 Copies/mL Final       BK viral loads   Recent Labs   Lab Test  07/28/11   1150   BKSPEC  Urine   BKRES  <1000       Imaging   Combined Report of:  PET CT on  8/8/2017 9:36 AM   1. PET of the neck, chest, abdomen, and pelvis.  2. PET CT Fusion for Attenuation Correction and Anatomical  Localization:    3.  3D MIP and PET-CT  fused images were processed on an independent  workstation and archived to PACS and reviewed by a radiologist.    INDICATION: Solitary pulmonary nodule  COMPARISON: None.    FINDINGS:   HEAD/NECK:  There is no  suspicious FDG uptake in the neck.     The paranasal sinuses are clear. The mastoid air cells are clear.     The mucosal pharyngeal space, the , prevertebral and carotid  spaces are within normal limits.     No masses, mass effect or pathologically enlarged lymph nodes are  evident. The thyroid gland is surgically absent.    CHEST:  There is 1.1 x 1.5 cm lingular pulmonary nodule (series 3 image 137)  with mild increased FDG uptake (SUV max of 2.6) corresponding to the  previously noted pulmonary nodule on 7/31/2017 exam. There is 0.7 x  0.4 cm left hilar lymph node (series 3 image 130) with increased FDG  uptake (SUV max of 6.3) and 0.9 x 1.2 cm left hilar lymph node (series  3 image 124) with SUV max of 6.4)    Scattered calcified pulmonary nodules, for example in the posterior  right lower lobe. Calcified pleural plaques most evident in the  posterior right lower lobe no evidence of pleural effusion or  pneumothorax.    Heart size is within normal limits. No pericardial effusion.  Atherosclerotic calcification of the coronary arteries and major  vessels.    ABDOMEN AND PELVIS:  There is no suspicious FDG uptake in the abdomen or pelvis.    Postsurgical changes of liver transplant. Trace pneumobilia. Atrophic  kidneys, consistent with medical renal disease. Fatty infiltration of  the pancreas. The unenhanced spleen and adrenal glands are  unremarkable. The gallbladder surgically absent. No abnormally dilated  bowel loops. No significant free fluid in the abdomen and pelvis.  Extensive colonic diverticulosis without CT evidence of acute  diverticulitis.    LOWER EXTREMITIES:   No abnormal masses or hypermetabolic lesions.    BONES:   There are no suspicious lytic or blastic osseous lesions.  There  is no  abnormal FDG uptake in the skeleton. A loose body within the inferior  aspect of the right knee joint.      Impression    IMPRESSION:   1. There is 1.5 x 1.1 cm lingular pulmonary nodule with mild increased  FDG uptake, slightly increased in size as compared to 4/24/2017 exam,  could represent granuloma. Should be considered malignant until proven  otherwise, additionally could represent granulomatous nodule,  recommend tissue biopsy for further characterization.  2. Few scattered hilar and mediastinal lymph nodes with mild increased  FDG uptake could represent reactive versus metastatic lymph nodes.  3. Stable post surgical changes of liver transplant with trace pneumobilia.  4. Atrophic kidneys, consistent with medical renal disease.  5. Extensive colonic diverticulosis without CT evidence of acute  diverticulitis.                         Crystal Montero MD

## 2017-08-22 DIAGNOSIS — E78.5 HYPERLIPIDEMIA WITH TARGET LDL LESS THAN 100: ICD-10-CM

## 2017-08-22 DIAGNOSIS — N18.30 CKD (CHRONIC KIDNEY DISEASE) STAGE 3, GFR 30-59 ML/MIN (H): ICD-10-CM

## 2017-08-22 DIAGNOSIS — N30.00 ACUTE CYSTITIS WITHOUT HEMATURIA: ICD-10-CM

## 2017-08-22 DIAGNOSIS — B27.00 EBV (EPSTEIN-BARR VIRUS) VIREMIA: ICD-10-CM

## 2017-08-22 LAB
ALBUMIN SERPL-MCNC: 3.3 G/DL (ref 3.4–5)
ALBUMIN UR-MCNC: 30 MG/DL
ALP SERPL-CCNC: 241 U/L (ref 40–150)
ALT SERPL W P-5'-P-CCNC: 107 U/L (ref 0–50)
ANION GAP SERPL CALCULATED.3IONS-SCNC: 9 MMOL/L (ref 3–14)
APPEARANCE UR: CLEAR
AST SERPL W P-5'-P-CCNC: 49 U/L (ref 0–45)
BILIRUB SERPL-MCNC: 0.3 MG/DL (ref 0.2–1.3)
BILIRUB UR QL STRIP: NEGATIVE
BUN SERPL-MCNC: 34 MG/DL (ref 7–30)
CALCIUM SERPL-MCNC: 9.5 MG/DL (ref 8.5–10.1)
CHLORIDE SERPL-SCNC: 101 MMOL/L (ref 94–109)
CHOLEST SERPL-MCNC: 291 MG/DL
CO2 SERPL-SCNC: 29 MMOL/L (ref 20–32)
COLOR UR AUTO: YELLOW
CREAT SERPL-MCNC: 1.29 MG/DL (ref 0.52–1.04)
CREAT UR-MCNC: 113 MG/DL
GFR SERPL CREATININE-BSD FRML MDRD: 41 ML/MIN/1.7M2
GLUCOSE SERPL-MCNC: 103 MG/DL (ref 70–99)
GLUCOSE UR STRIP-MCNC: NEGATIVE MG/DL
HDLC SERPL-MCNC: 49 MG/DL
HGB UR QL STRIP: NEGATIVE
KETONES UR STRIP-MCNC: NEGATIVE MG/DL
LDLC SERPL CALC-MCNC: ABNORMAL MG/DL
LDLC SERPL DIRECT ASSAY-MCNC: 174 MG/DL
LEUKOCYTE ESTERASE UR QL STRIP: ABNORMAL
MICROALBUMIN UR-MCNC: 212 MG/L
MICROALBUMIN/CREAT UR: 187.61 MG/G CR (ref 0–25)
NITRATE UR QL: NEGATIVE
NON-SQ EPI CELLS #/AREA URNS LPF: ABNORMAL /LPF
NONHDLC SERPL-MCNC: 242 MG/DL
PH UR STRIP: 7 PH (ref 5–7)
POTASSIUM SERPL-SCNC: 3.6 MMOL/L (ref 3.4–5.3)
PROT SERPL-MCNC: 8.3 G/DL (ref 6.8–8.8)
RBC #/AREA URNS AUTO: ABNORMAL /HPF
SODIUM SERPL-SCNC: 139 MMOL/L (ref 133–144)
SOURCE: ABNORMAL
SP GR UR STRIP: 1.01 (ref 1–1.03)
TRIGL SERPL-MCNC: 447 MG/DL
UROBILINOGEN UR STRIP-ACNC: 0.2 EU/DL (ref 0.2–1)
WBC #/AREA URNS AUTO: ABNORMAL /HPF

## 2017-08-22 PROCEDURE — 82043 UR ALBUMIN QUANTITATIVE: CPT | Performed by: FAMILY MEDICINE

## 2017-08-22 PROCEDURE — 81001 URINALYSIS AUTO W/SCOPE: CPT | Performed by: FAMILY MEDICINE

## 2017-08-22 PROCEDURE — 83721 ASSAY OF BLOOD LIPOPROTEIN: CPT | Mod: 59 | Performed by: FAMILY MEDICINE

## 2017-08-22 PROCEDURE — 80061 LIPID PANEL: CPT | Performed by: INTERNAL MEDICINE

## 2017-08-22 PROCEDURE — 87799 DETECT AGENT NOS DNA QUANT: CPT | Performed by: INTERNAL MEDICINE

## 2017-08-22 PROCEDURE — 36415 COLL VENOUS BLD VENIPUNCTURE: CPT | Performed by: INTERNAL MEDICINE

## 2017-08-22 PROCEDURE — 80053 COMPREHEN METABOLIC PANEL: CPT | Performed by: INTERNAL MEDICINE

## 2017-08-23 ENCOUNTER — TELEPHONE (OUTPATIENT)
Dept: GASTROENTEROLOGY | Facility: CLINIC | Age: 67
End: 2017-08-23

## 2017-08-23 DIAGNOSIS — Z12.11 ENCOUNTER FOR SCREENING COLONOSCOPY: Primary | ICD-10-CM

## 2017-08-23 LAB
EBV DNA # SPEC NAA+PROBE: NORMAL {COPIES}/ML
EBV DNA SPEC NAA+PROBE-LOG#: NORMAL {LOG_COPIES}/ML

## 2017-08-23 NOTE — TELEPHONE ENCOUNTER
Patient scheduled for Colonoscopy    Indication for procedure. Screening    Referring Provider. Gentry Ramirez MD    ? Not Needed    Arrival time verified? Yes, 0700    Facility location verified? Yes, [address]    Instructions given regarding prep and procedure    Prep Type Michelle    Are you taking any anticoagulants or blood thinners? Eliquis    Instructions given? Per patient was instructed by her physician to stop 3 days prior to procedure.     Electronic implanted devices? No    Pre procedure teaching completed? Yes    Transportation from procedure?     H&P / Pre op physical completed? Completed on 07/31/17

## 2017-08-27 NOTE — PROGRESS NOTES
Dear [place]    Your test results are attached.     The protein is a little higher in your urine test. This goes along with the chronic kidney disease stage 3. This has been very stable and can be rechecked in 1 year. There is no sign of infection.    Please contact me by MyChart if you have any questions about your labs or management.    Jennifer aBrraza MD

## 2017-08-28 ENCOUNTER — OFFICE VISIT (OUTPATIENT)
Dept: SLEEP MEDICINE | Facility: CLINIC | Age: 67
End: 2017-08-28
Attending: INTERNAL MEDICINE
Payer: MEDICARE

## 2017-08-28 VITALS
WEIGHT: 187 LBS | DIASTOLIC BLOOD PRESSURE: 75 MMHG | BODY MASS INDEX: 28.34 KG/M2 | OXYGEN SATURATION: 100 % | HEIGHT: 68 IN | RESPIRATION RATE: 16 BRPM | SYSTOLIC BLOOD PRESSURE: 155 MMHG | HEART RATE: 67 BPM

## 2017-08-28 DIAGNOSIS — G47.9 SLEEP DISORDER: ICD-10-CM

## 2017-08-28 DIAGNOSIS — E83.19 OTHER DISORDERS OF IRON METABOLISM: Primary | ICD-10-CM

## 2017-08-28 PROCEDURE — 99211 OFF/OP EST MAY X REQ PHY/QHP: CPT | Mod: ZF

## 2017-08-28 RX ORDER — ZOLPIDEM TARTRATE 5 MG/1
TABLET ORAL
Qty: 1 TABLET | Refills: 0 | Status: SHIPPED | OUTPATIENT
Start: 2017-08-28 | End: 2017-09-19

## 2017-08-28 NOTE — PATIENT INSTRUCTIONS
Your BMI is Body mass index is 28.86 kg/(m^2).  Weight management is a personal decision.  If you are interested in exploring weight loss strategies, the following discussion covers the approaches that may be successful. Body mass index (BMI) is one way to tell whether you are at a healthy weight, overweight, or obese. It measures your weight in relation to your height.  A BMI of 18.5 to 24.9 is in the healthy range. A person with a BMI of 25 to 29.9 is considered overweight, and someone with a BMI of 30 or greater is considered obese. More than two-thirds of American adults are considered overweight or obese.  Being overweight or obese increases the risk for further weight gain. Excess weight may lead to heart disease and diabetes.  Creating and following plans for healthy eating and physical activity may help you improve your health.  Weight control is part of healthy lifestyle and includes exercise, emotional health, and healthy eating habits. Careful eating habits lifelong are the mainstay of weight control. Though there are significant health benefits from weight loss, long-term weight loss with diet alone may be very difficult to achieve- studies show long-term success with dietary management in less than 10% of people. Attaining a healthy weight may be especially difficult to achieve in those with severe obesity. In some cases, medications, devices and surgical management might be considered.  What can you do?  If you are overweight or obese and are interested in methods for weight loss, you should discuss this with your provider.     Consider reducing daily calorie intake by 500 calories.     Keep a food journal.     Avoiding skipping meals, consider cutting portions instead.    Diet combined with exercise helps maintain muscle while optimizing fat loss. Strength training is particularly important for building and maintaining muscle mass. Exercise helps reduce stress, increase energy, and improves fitness.  Increasing exercise without diet control, however, may not burn enough calories to loose weight.       Start walking three days a week 10-20 minutes at a time    Work towards walking thirty minutes five days a week     Eventually, increase the speed of your walking for 1-2 minutes at time    In addition, we recommend that you review healthy lifestyles and methods for weight loss available through the National Institutes of Health patient information sites:  http://win.niddk.nih.gov/publications/index.htm    And look into health and wellness programs that may be available through your health insurance provider, employer, local community center, or kristie club.    Weight management plan: Patient was referred to their PCP to discuss a diet and exercise plan.     Your blood pressure was checked while you were in clinic today.  Please read the guidelines below about what these numbers mean and what you should do about them.  Your systolic blood pressure is the top number.  This is the pressure when the heart is pumping.  Your diastolic blood pressure is the bottom number.  This is the pressure in between beats.  If your systolic blood pressure is less than 120 and your diastolic blood pressure is less than 80, then your blood pressure is normal. There is nothing more that you need to do about it  If your systolic blood pressure is 120-139 or your diastolic blood pressure is 80-89, your blood pressure may be higher than it should be.  You should have your blood pressure re-checked within a year by a primary care provider.  If your systolic blood pressure is 140 or greater or your diastolic blood pressure is 90 or greater, you may have high blood pressure.  High blood pressure is treatable, but if left untreated over time it can put you at risk for heart attack, stroke, or kidney failure.  You should have your blood pressure re-checked by a primary care provider within the next four weeks.      MY TREATMENT INFORMATION FOR  "SLEEP APNEA-  Luz Thompson    DOCTOR : Vero Patterson [facility]  SLEEP CENTER :      MY CONTACT NUMBER:     Am I having a sleep study at a sleep center?  Make sure you have an appointment for the study before you leave!    Am I having a home sleep study?  Watch this video:  [email or web address]?[email or web address]_[email or web address]    Frequently asked questions:  1. What is Obstructive Sleep Apnea (RASHIDA)? RASHIDA is the most common type of sleep apnea. Apnea means, \"without breath.\"  Apnea is most often caused by narrowing or collapse of the upper airway as muscles relax during sleep.   Almost everyone has occasional apneas. Most people with sleep apnea have had brief interruptions at night frequently for many years.  The severity of sleep apnea is related to how frequent and severe the events are.   2. What are the consequences of RASHIDA? Symptoms include: feeling sleepy during the day, snoring loudly, gasping or stopping of breathing, trouble sleeping, and occasionally morning headaches or heartburn at night.  Sleepiness can be serious and even increase the risk of falling asleep while driving. Other health consequences may include development of high blood pressure and other cardiovascular disease in persons who are susceptible. Untreated RASHIDA  can contribute to heart disease, stroke and diabetes.   3. What are the treatment options? In most situations, sleep apnea is a lifelong disease that must be managed with daily therapy. Medications are not effective for sleep apnea and surgery is generally not considered until other therapies have been tried. Your treatment is your choice . Continuous Positive Airway (CPAP) works right away and is the therapy that is effective in nearly everyone. An oral device to hold your jaw forward is usually the next most reliable option. Other options include postioning devices (to keep you off your back), weight loss, and surgery including a tongue " pacing device. There is more detail about some of these options below.    Important tips for using CPAP and similar devices   Know your equipment:  CPAP is continuous positive airway pressure that prevents obstructive sleep apnea by keeping the throat from collapsing while you are sleeping. In most cases, the device is  smart  and can slowly self-adjusts if your throat collapses and keeps a record every day of how well you are treated-this information is available to you and your care team.  BPAP is bilevel positive airway pressure that keeps your throat open and also assists each breath with a pressure boost to maintain adequate breathing.  Special kinds of BPAP are used in patients who have inadequate breathing from lung or heart disease. In most cases, the device is  smart  and can slowly self-adjusts to assist breathing. Like CPAP, the device keeps a record of how well you are treated.  Your mask is your connection to the device. You get to choose what feels most comfortable and the staff will help to make sure if fits. Here: are some examples of the different masks that are available:       Key points to remember on your journey with sleep apnea:  1. Sleep study.  PAP devices often need to be adjusted during a sleep study to show that they are effective and adjusted right.  2. Good tips to remember: Try wearing just the mask during a quiet time during the day so your body adapts to wearing it. A humidifier is recommended for comfort in most cases to prevent drying of your nose and throat. Allergy medication from your provider may help you if you are having nasal congestion.  3. Getting settled-in. It takes more than one night for most of us to get used to wearing a mask. Try wearing just the mask during a quiet time during the day so your body adapts to wearing it. A humidifier is recommended for comfort in most cases. Our team will work with you carefully on the first day and will be in contact within 4 days  and again at 2 and 4 weeks for advice and remote device adjustments. Your therapy is evaluated by the device each day.   4. Use it every night. The more you are able to sleep naturally for 7-8 hours, the more likely you will have good sleep and to prevent health risks or symptoms from sleep apnea. Even if you use it 4 hours it helps. Occasionally all of us are unable to use a medical therapy, in sleep apnea, it is not dangerous to miss one night.   5. Communicate. Call our skilled team on the number provided on the first day if your visit for problems that make it difficult to wear the device. Over 2 out of 3 patients can learn to wear the device long-term with help from our team. Remember to call our team or your sleep providers if you are unable to wear the device as we may have other solutions for those who cannot adapt to mask CPAP therapy. It is recommended that you sleep your sleep provider within the first 3 months and yearly after that if you are not having problems.   Take care of your equipment. Make sure you clean your mask and tubing using directions every day and that your filter and mask are replaced as recommended or if they are not working.     BESIDES CPAP, WHAT OTHER THERAPIES ARE THERE?    Positioning Device  Positioning devices are generally used when sleep apnea is mild and only occurs on your back.This example shows a pillow that straps around the waist. It may be appropriate for those whose sleep study shows milder sleep apnea that occurs primarily when lying flat on one's back. Preliminary studies have shown benefit but effectiveness at home may need to be verified by a home sleep test. These devices are generally not covered by medical insurance.    Oral Appliance  What is oral appliance therapy?  An oral appliance device fits on your teeth at night like a retainer used after having braces. The device is made by a specialized dentist and requires several visits over 1-2 months before a  manufactured device is made to fit your teeth and is adjusted to prevent your sleep apnea. Once an oral device is working properly, snoring should be improved. A home sleep test may be recommended at that time if to determine whether the sleep apnea is adequately treated.       Some things to remember:  -Oral devices are often, but not always, covered by your medical insurance. Be sure to check with your insurance provider.   -If you are referred for oral therapy, you will be given a list of specialized dentists to consider or you may choose to visit the Web site of the American Academy of Dental Sleep Medicine  -Oral devices are less likely to work if you have severe sleep apnea or are extremely overweight.     More detailed information  An oral appliance is a small acrylic device that fits over the upper and lower teeth  (similar to a retainer or a mouth guard). This device slightly moves jaw forward, which moves the base of the tongue forward, opens the airway, improves breathing for effective treat snoring and obstructive sleep apnea in perhaps 7 out of 10 people .  The best working devices are custom-made by a dental device  after a mold is made of the teeth 1, 2, 3.  When is an oral appliance indicated?  Oral appliance therapy is recommended as a first-line treatment for patients with primary snoring, mild sleep apnea, and for patients with moderate sleep apnea who prefer appliance therapy to use of CPAP4, 5. Severity of sleep apnea is determined by sleep testing and is based on the number of respiratory events per hour of sleep.   How successful is oral appliance therapy?  The success rate of oral appliance therapy in patients with mild sleep apnea is 75-80% while in patients with moderate sleep apnea it is 50-70%. The chance of success in patients with severe sleep apnea is 40-50%. The research also shows that oral appliances have a beneficial effect on the cardiovascular health of RASHIDA patients  at the same magnitude as CPAP therapy7.  Oral appliances should be a second-line treatment in cases of severe sleep apnea, but if not completely successful then a combination therapy utilizing CPAP plus oral appliance therapy may be effective. Oral appliances tend to be effective in a broad range of patients although studies show that the patients who have the highest success are females, younger patients, those with milder disease, and less severe obesity. 3, 6.   Finding a dentist that practices dental sleep medicine  Specific training is available through the American Academy of Dental Sleep Medicine for dentists interested in working in the field of sleep. To find a dentist who is educated in the field of sleep and the use of oral appliances, near you, visit the Web site of the American Academy of Dental Sleep Medicine.    References  1. Hayley et al. Objectively measured vs self-reported compliance during oral appliance therapy for sleep-disordered breathing. Chest 2013; 144(5): 1712-1111.  2. Matthew et al. Objective measurement of compliance during oral appliance therapy for sleep-disordered breathing. Thorax 2013; 68(1): 91-96.  3. Sadiq, et al. Mandibular advancement devices in 620 men and women with RASHIDA and snoring: tolerability and predictors of treatment success. Chest 2004; 125: 8162-1319.  4. Damon, et al. Oral appliances for snoring and RASHIDA: a review. Sleep 2006; 29: 244-262.  5. Madhu et al. Oral appliance treatment for RASHIDA: an update. J Clin Sleep Med 2014; 10(2): 215-227.  6. Pablo et al. Predictors of OSAH treatment outcome. J Dent Res 2007; 86: 4489-8558.      Weight Loss:    Weight loss is a long-term strategy that may improve sleep apnea in some patients.    Weight management is a personal decision.  If you are interested in exploring weight loss strategies, the following discussion covers the impact on weight loss on sleep apnea and the approaches that may be  successful.    Weight loss decreases severity of sleep apnea in most people with obesity. For those with mild obesity who have developed snoring with weight gain, even 15-30 pound weight loss can improve and occasionally eliminate sleep apnea.  Structured and life-long dietary and health habits are necessary to lose weight and keep healthier weight levels.     Though there may be significant health benefits from weight loss, long-term weight loss is very difficult to achieve- studies show success with dietary management in less than 10% of people. In addition, substantial weight loss may require years of dietary control and may be difficult if patients have severe obesity. In these cases, surgical management may be considered.  Finally, older individuals who have tolerated obesity without health complications may be less likely to benefit from weight loss strategies.        Your BMI is Body mass index is 28.86 kg/(m^2).  Weight management is a personal decision.  If you are interested in exploring weight loss strategies, the following discussion covers the approaches that may be successful. Body mass index (BMI) is one way to tell whether you are at a healthy weight, overweight, or obese. It measures your weight in relation to your height.  A BMI of 18.5 to 24.9 is in the healthy range. A person with a BMI of 25 to 29.9 is considered overweight, and someone with a BMI of 30 or greater is considered obese. More than two-thirds of American adults are considered overweight or obese.  Being overweight or obese increases the risk for further weight gain. Excess weight may lead to heart disease and diabetes.  Creating and following plans for healthy eating and physical activity may help you improve your health.  Weight control is part of healthy lifestyle and includes exercise, emotional health, and healthy eating habits. Careful eating habits lifelong are the mainstay of weight control. Though there are significant  health benefits from weight loss, long-term weight loss with diet alone may be very difficult to achieve- studies show long-term success with dietary management in less than 10% of people. Attaining a healthy weight may be especially difficult to achieve in those with severe obesity. In some cases, medications, devices and surgical management might be considered.  What can you do?  If you are overweight or obese and are interested in methods for weight loss, you should discuss this with your provider.     Consider reducing daily calorie intake by 500 calories.     Keep a food journal.     Avoiding skipping meals, consider cutting portions instead.    Diet combined with exercise helps maintain muscle while optimizing fat loss. Strength training is particularly important for building and maintaining muscle mass. Exercise helps reduce stress, increase energy, and improves fitness. Increasing exercise without diet control, however, may not burn enough calories to loose weight.       Start walking three days a week 10-20 minutes at a time    Work towards walking thirty minutes five days a week     Eventually, increase the speed of your walking for 1-2 minutes at time    In addition, we recommend that you review healthy lifestyles and methods for weight loss available through the National Institutes of Health patient information sites:  http://win.niddk.nih.gov/publications/index.htm    And look into health and wellness programs that may be available through your health insurance provider, employer, local community center, or kristie club.          Surgery:    Surgery for obstructive sleep apnea is considered generally only when other therapies fail to work. Surgery may be discussed with you if you are having a difficult time tolerating CPAP and or when there is an abnormal structure that requires surgical correction.  Nose and throat surgeries often enlarge the airway to prevent collapse.  Most of these surgeries create  pain for 1-2 weeks and up to half of the most common surgeries are not effective throughout life.  You should carefully discuss the benefits and drawbacks to surgery with your sleep provider and surgeon to determine if it is the best solution for you.   More information  Surgery for RASHIDA is directed at areas that are responsible for narrowing or complete obstruction of the airway during sleep.  There are a wide range of procedures available to enlarge and/or stabilize the airway to prevent blockage of breathing in the three major areas where it can occur: the palate, tongue, and nasal regions.  Successful surgical treatment depends on the accurate identification of the factors responsible for obstructive sleep apnea in each person.  A personalized approach is required because there is no single treatment that works well for everyone.  Because of anatomic variation, consultation with an examination by a sleep surgeon is a critical first step in determining what surgical options are best for each patient.  In some cases, examination during sedation may be recommended in order to guide the selection of procedures.  Patients will be counseled about risks and benefits as well as the typical recovery course after surgery. Surgery is typically not a cure for a person s RASHIDA.  However, surgery will often significantly improve one s RASHIDA severity (termed  success rate ).  Even in the absence of a cure, surgery will decrease the cardiovascular risk associated with OSA7; improve overall quality of life8 (sleepiness, functionality, sleep quality, etc).      Palate Procedures:  Patients with RASHIDA often have narrowing of their airway in the region of their tonsils and uvula.  The goals of palate procedures are to widen the airway in this region as well as to help the tissues resist collapse.  Modern palate procedure techniques focus on tissue conservation and soft tissue rearrangement, rather than tissue removal.  Often the uvula is  preserved in this procedure. Residual sleep apnea is common in patient after pharyngoplasty with an average reduction in sleep apnea events of 33%2.      Tongue Procedures:  ExamWhile patients are awake, the muscles that surround the throat are active and keep this region open for breathing. These muscles relax during sleep, allowing the tongue and other structures to collapse and block breathing.  There are several different tongue procedures available.  Selection of a tongue base procedure depends on characteristics seen on physical exam.  Generally, procedures are aimed at removing bulky tissues in this area or preventing the back of the tongue from falling back during sleep.  Success rates for tongue surgery range from 50-62%3.    Hypoglossal Nerve Stimulation:  Hypoglossal nerve stimulation has recently received approval from the [place] Food and Drug Administration for the treatment of obstructive sleep apnea.  This is based on research showing that the system was safe and effective in treating sleep apnea6.  Results showed that the median AHI score decreased 68%, from 29.3 to 9.0. This therapy uses an implant system that senses breathing patterns and delivers mild stimulation to airway muscles, which keeps the airway open during sleep.  The system consists of three fully implanted components: a small generator (similar in size to a pacemaker), a breathing sensor, and a stimulation lead.  Using a small handheld remote, a patient turns the therapy on before bed and off upon awakening.    Candidates for this device must be greater than 22 years of age, have moderate to severe RASHIDA (AHI between 20-65), BMI less than 32, have tried CPAP/oral appliance without success, and have appropriate upper airway anatomy (determined by a sleep endoscopy performed by Dr. Rebolledo).    Hypoglossal Nerve Stimulation Pathway:    The sleep surgeon s office will work with the patient through the insurance prior-authorization  process (including communications and appeals).    Nasal Procedures:  Nasal obstruction can interfere with nasal breathing during the day and night.  Studies have shown that relief of nasal obstruction can improve the ability of some patients to tolerate positive airway pressure therapy for obstructive sleep apnea1.  Treatment options include medications such as nasal saline, topical corticosteroid and antihistamine sprays, and oral medications such as antihistamines or decongestants. Non-surgical treatments can include external nasal dilators for selected patients. If these are not successful by themselves, surgery can improve the nasal airway either alone or in combination with these other options.      Combination Procedures:  Combination of surgical procedures and other treatments may be recommended, particularly if patients have more than one area of narrowing or persistent positional disease.  The success rate of combination surgery ranges from 66-80%2,3.    References  1. Brigida AVALOS. The Role of the Nose in Snoring and Obstructive Sleep Apnoea: An Update.  Eur Arch Otorhinolaryngol. 2011; 268: 1365-73.  2.  Erna SM; Antwan JA; Andrea JR; Pallanch JF; Brenda MB; Nori SG; Luz Marina OSORIO. Surgical modifications of the upper airway for obstructive sleep apnea in adults: a systematic review and meta-analysis. SLEEP 2010;33(10):3886-0460. Melody BOO. Hypopharyngeal surgery in obstructive sleep apnea: an evidence-based medicine review.  Arch Otolaryngol Head Neck Surg. 2006 Feb;132(2):206-13.  3. Jose L YH1, Jan Y, Ishmael ISAIAH. The efficacy of anatomically based multilevel surgery for obstructive sleep apnea. Otolaryngol Head Neck Surg. 2003 Oct;129(4):327-35.  4. Kezirian E, Goldberg A. Hypopharyngeal Surgery in Obstructive Sleep Apnea: An Evidence-Based Medicine Review. Arch Otolaryngol Head Neck Surg. 2006 Feb;132(2):206-13.  5. Kurt JOSHI et al. Upper-Airway Stimulation for Obstructive Sleep Apnea.  N Engl J Med.  2014 Jan 9;370(2):139-49.  6. Jesica Y et al. Increased Incidence of Cardiovascular Disease in Middle-aged Men with Obstructive Sleep Apnea. Am J Respir Crit Care Med; 2002 166: 159-165  7. Rhona COLVIN et al. Studying Life Effects and Effectiveness of Palatopharyngoplasty (SLEEP) study: Subjective Outcomes of Isolated Uvulopalatopharyngoplasty. Otolaryngol Head Neck Surg. 2011; 144: 623-631.    Please do not drive if drowsy or sleepy;  pull over if drowsy.

## 2017-08-28 NOTE — NURSING NOTE
"Chief Complaint   Patient presents with     Consult     Discuss getting sleep study due to past ER visit were O2 was low while sleeping       Initial /75  Pulse 67  Resp 16  Ht 1.715 m (5' 7.5\")  Wt 84.8 kg (187 lb)  SpO2 100%  BMI 28.86 kg/m2 Estimated body mass index is 28.86 kg/(m^2) as calculated from the following:    Height as of this encounter: 1.715 m (5' 7.5\").    Weight as of this encounter: 84.8 kg (187 lb).  Medication Reconciliation: complete     NORMA Roa        "

## 2017-08-28 NOTE — MR AVS SNAPSHOT
After Visit Summary   8/28/2017    Luz Thompson    MRN: [id number]           Patient Information     Date Of Birth          1950        Visit Information        Provider Department      8/28/2017 10:00 AM Vero Quiñonez MD [facility], [place], Sleep Study        Today's Diagnoses     Other disorders of iron metabolism    -  1    Sleep disorder          Care Instructions      Your BMI is Body mass index is 28.86 kg/(m^2).  Weight management is a personal decision.  If you are interested in exploring weight loss strategies, the following discussion covers the approaches that may be successful. Body mass index (BMI) is one way to tell whether you are at a healthy weight, overweight, or obese. It measures your weight in relation to your height.  A BMI of 18.5 to 24.9 is in the healthy range. A person with a BMI of 25 to 29.9 is considered overweight, and someone with a BMI of 30 or greater is considered obese. More than two-thirds of American adults are considered overweight or obese.  Being overweight or obese increases the risk for further weight gain. Excess weight may lead to heart disease and diabetes.  Creating and following plans for healthy eating and physical activity may help you improve your health.  Weight control is part of healthy lifestyle and includes exercise, emotional health, and healthy eating habits. Careful eating habits lifelong are the mainstay of weight control. Though there are significant health benefits from weight loss, long-term weight loss with diet alone may be very difficult to achieve- studies show long-term success with dietary management in less than 10% of people. Attaining a healthy weight may be especially difficult to achieve in those with severe obesity. In some cases, medications, devices and surgical management might be considered.  What can you do?  If you are overweight or obese and are interested in methods for weight loss, you  should discuss this with your provider.     Consider reducing daily calorie intake by 500 calories.     Keep a food journal.     Avoiding skipping meals, consider cutting portions instead.    Diet combined with exercise helps maintain muscle while optimizing fat loss. Strength training is particularly important for building and maintaining muscle mass. Exercise helps reduce stress, increase energy, and improves fitness. Increasing exercise without diet control, however, may not burn enough calories to loose weight.       Start walking three days a week 10-20 minutes at a time    Work towards walking thirty minutes five days a week     Eventually, increase the speed of your walking for 1-2 minutes at time    In addition, we recommend that you review healthy lifestyles and methods for weight loss available through the National Institutes of Health patient information sites:  http://win.niddk.nih.gov/publications/index.htm    And look into health and wellness programs that may be available through your health insurance provider, employer, local community center, or [employer].    Weight management plan: Patient was referred to their PCP to discuss a diet and exercise plan.     Your blood pressure was checked while you were in clinic today.  Please read the guidelines below about what these numbers mean and what you should do about them.  Your systolic blood pressure is the top number.  This is the pressure when the heart is pumping.  Your diastolic blood pressure is the bottom number.  This is the pressure in between beats.  If your systolic blood pressure is less than 120 and your diastolic blood pressure is less than 80, then your blood pressure is normal. There is nothing more that you need to do about it  If your systolic blood pressure is 120-139 or your diastolic blood pressure is 80-89, your blood pressure may be higher than it should be.  You should have your blood pressure re-checked within a year by a primary  "care provider.  If your systolic blood pressure is 140 or greater or your diastolic blood pressure is 90 or greater, you may have high blood pressure.  High blood pressure is treatable, but if left untreated over time it can put you at risk for heart attack, stroke, or kidney failure.  You should have your blood pressure re-checked by a primary care provider within the next four weeks.      MY TREATMENT INFORMATION FOR SLEEP APNEA-  Luz Thompson    DOCTOR : Vero Quiñonez  SLEEP CENTER :      MY CONTACT NUMBER:     Am I having a sleep study at a sleep center?  Make sure you have an appointment for the study before you leave!    Am I having a home sleep study?  Watch this video:  https://www.TYFFON.com/watch?v=CteI_GhyP9g&list=[email or web address]    Frequently asked questions:  1. What is Obstructive Sleep Apnea (RASHIDA)? RASHIDA is the most common type of sleep apnea. Apnea means, \"without breath.\"  Apnea is most often caused by narrowing or collapse of the upper airway as muscles relax during sleep.   Almost everyone has occasional apneas. Most people with sleep apnea have had brief interruptions at night frequently for many years.  The severity of sleep apnea is related to how frequent and severe the events are.   2. What are the consequences of RASHIDA? Symptoms include: feeling sleepy during the day, snoring loudly, gasping or stopping of breathing, trouble sleeping, and occasionally morning headaches or heartburn at night.  Sleepiness can be serious and even increase the risk of falling asleep while driving. Other health consequences may include development of high blood pressure and other cardiovascular disease in persons who are susceptible. Untreated RASHIDA  can contribute to heart disease, stroke and diabetes.   3. What are the treatment options? In most situations, sleep apnea is a lifelong disease that must be managed with daily therapy. Medications are not effective for " sleep apnea and surgery is generally not considered until other therapies have been tried. Your treatment is your choice . Continuous Positive Airway (CPAP) works right away and is the therapy that is effective in nearly everyone. An oral device to hold your jaw forward is usually the next most reliable option. Other options include postioning devices (to keep you off your back), weight loss, and surgery including a tongue pacing device. There is more detail about some of these options below.    Important tips for using CPAP and similar devices   Know your equipment:  CPAP is continuous positive airway pressure that prevents obstructive sleep apnea by keeping the throat from collapsing while you are sleeping. In most cases, the device is  smart  and can slowly self-adjusts if your throat collapses and keeps a record every day of how well you are treated-this information is available to you and your care team.  BPAP is bilevel positive airway pressure that keeps your throat open and also assists each breath with a pressure boost to maintain adequate breathing.  Special kinds of BPAP are used in patients who have inadequate breathing from lung or heart disease. In most cases, the device is  smart  and can slowly self-adjusts to assist breathing. Like CPAP, the device keeps a record of how well you are treated.  Your mask is your connection to the device. You get to choose what feels most comfortable and the staff will help to make sure if fits. Here: are some examples of the different masks that are available:       Key points to remember on your journey with sleep apnea:  1. Sleep study.  PAP devices often need to be adjusted during a sleep study to show that they are effective and adjusted right.  2. Good tips to remember: Try wearing just the mask during a quiet time during the day so your body adapts to wearing it. A humidifier is recommended for comfort in most cases to prevent drying of your nose and throat.  Allergy medication from your provider may help you if you are having nasal congestion.  3. Getting settled-in. It takes more than one night for most of us to get used to wearing a mask. Try wearing just the mask during a quiet time during the day so your body adapts to wearing it. A humidifier is recommended for comfort in most cases. Our team will work with you carefully on the first day and will be in contact within 4 days and again at 2 and 4 weeks for advice and remote device adjustments. Your therapy is evaluated by the device each day.   4. Use it every night. The more you are able to sleep naturally for 7-8 hours, the more likely you will have good sleep and to prevent health risks or symptoms from sleep apnea. Even if you use it 4 hours it helps. Occasionally all of us are unable to use a medical therapy, in sleep apnea, it is not dangerous to miss one night.   5. Communicate. Call our skilled team on the number provided on the first day if your visit for problems that make it difficult to wear the device. Over 2 out of 3 patients can learn to wear the device long-term with help from our team. Remember to call our team or your sleep providers if you are unable to wear the device as we may have other solutions for those who cannot adapt to mask CPAP therapy. It is recommended that you sleep your sleep provider within the first 3 months and yearly after that if you are not having problems.   Take care of your equipment. Make sure you clean your mask and tubing using directions every day and that your filter and mask are replaced as recommended or if they are not working.     BESIDES CPAP, WHAT OTHER THERAPIES ARE THERE?    Positioning Device  Positioning devices are generally used when sleep apnea is mild and only occurs on your back.This example shows a pillow that straps around the waist. It may be appropriate for those whose sleep study shows milder sleep apnea that occurs primarily when lying flat on one's  back. Preliminary studies have shown benefit but effectiveness at home may need to be verified by a home sleep test. These devices are generally not covered by medical insurance.    Oral Appliance  What is oral appliance therapy?  An oral appliance device fits on your teeth at night like a retainer used after having braces. The device is made by a specialized dentist and requires several visits over 1-2 months before a manufactured device is made to fit your teeth and is adjusted to prevent your sleep apnea. Once an oral device is working properly, snoring should be improved. A home sleep test may be recommended at that time if to determine whether the sleep apnea is adequately treated.       Some things to remember:  -Oral devices are often, but not always, covered by your medical insurance. Be sure to check with your insurance provider.   -If you are referred for oral therapy, you will be given a list of specialized dentists to consider or you may choose to visit the Web site of the American Academy of Dental Sleep Medicine  -Oral devices are less likely to work if you have severe sleep apnea or are extremely overweight.     More detailed information  An oral appliance is a small acrylic device that fits over the upper and lower teeth  (similar to a retainer or a mouth guard). This device slightly moves jaw forward, which moves the base of the tongue forward, opens the airway, improves breathing for effective treat snoring and obstructive sleep apnea in perhaps 7 out of 10 people .  The best working devices are custom-made by a dental device  after a mold is made of the teeth 1, 2, 3.  When is an oral appliance indicated?  Oral appliance therapy is recommended as a first-line treatment for patients with primary snoring, mild sleep apnea, and for patients with moderate sleep apnea who prefer appliance therapy to use of CPAP4, 5. Severity of sleep apnea is determined by sleep testing and is based on the  number of respiratory events per hour of sleep.   How successful is oral appliance therapy?  The success rate of oral appliance therapy in patients with mild sleep apnea is 75-80% while in patients with moderate sleep apnea it is 50-70%. The chance of success in patients with severe sleep apnea is 40-50%. The research also shows that oral appliances have a beneficial effect on the cardiovascular health of RASHIDA patients at the same magnitude as CPAP therapy7.  Oral appliances should be a second-line treatment in cases of severe sleep apnea, but if not completely successful then a combination therapy utilizing CPAP plus oral appliance therapy may be effective. Oral appliances tend to be effective in a broad range of patients although studies show that the patients who have the highest success are females, younger patients, those with milder disease, and less severe obesity. 3, 6.   Finding a dentist that practices dental sleep medicine  Specific training is available through the American Academy of Dental Sleep Medicine for dentists interested in working in the field of sleep. To find a dentist who is educated in the field of sleep and the use of oral appliances, near you, visit the Web site of the American Academy of Dental Sleep Medicine.    References  1. Hayley et al. Objectively measured vs self-reported compliance during oral appliance therapy for sleep-disordered breathing. Chest 2013; 144(5): 6460-7877.  2. Matthew et al. Objective measurement of compliance during oral appliance therapy for sleep-disordered breathing. Thorax 2013; 68(1): 91-96.  3. Sadiq, et al. Mandibular advancement devices in 620 men and women with RASHIDA and snoring: tolerability and predictors of treatment success. Chest 2004; 125: 3538-7044.  4. Marisol, et al. Oral appliances for snoring and RASHIDA: a review. Sleep 2006; 29: 244-262.  5. Madhu et al. Oral appliance treatment for RASHIDA: an update. J Clin Sleep Med 2014; 10(2):  215-227.  6. peter Shah al. Predictors of OSAH treatment outcome. J Dent Res 2007; 86: 7586-2734.      Weight Loss:    Weight loss is a long-term strategy that may improve sleep apnea in some patients.    Weight management is a personal decision.  If you are interested in exploring weight loss strategies, the following discussion covers the impact on weight loss on sleep apnea and the approaches that may be successful.    Weight loss decreases severity of sleep apnea in most people with obesity. For those with mild obesity who have developed snoring with weight gain, even 15-30 pound weight loss can improve and occasionally eliminate sleep apnea.  Structured and life-long dietary and health habits are necessary to lose weight and keep healthier weight levels.     Though there may be significant health benefits from weight loss, long-term weight loss is very difficult to achieve- studies show success with dietary management in less than 10% of people. In addition, substantial weight loss may require years of dietary control and may be difficult if patients have severe obesity. In these cases, surgical management may be considered.  Finally, older individuals who have tolerated obesity without health complications may be less likely to benefit from weight loss strategies.        Your BMI is Body mass index is 28.86 kg/(m^2).  Weight management is a personal decision.  If you are interested in exploring weight loss strategies, the following discussion covers the approaches that may be successful. Body mass index (BMI) is one way to tell whether you are at a healthy weight, overweight, or obese. It measures your weight in relation to your height.  A BMI of 18.5 to 24.9 is in the healthy range. A person with a BMI of 25 to 29.9 is considered overweight, and someone with a BMI of 30 or greater is considered obese. More than two-thirds of American adults are considered overweight or obese.  Being overweight or obese  increases the risk for further weight gain. Excess weight may lead to heart disease and diabetes.  Creating and following plans for healthy eating and physical activity may help you improve your health.  Weight control is part of healthy lifestyle and includes exercise, emotional health, and healthy eating habits. Careful eating habits lifelong are the mainstay of weight control. Though there are significant health benefits from weight loss, long-term weight loss with diet alone may be very difficult to achieve- studies show long-term success with dietary management in less than 10% of people. Attaining a healthy weight may be especially difficult to achieve in those with severe obesity. In some cases, medications, devices and surgical management might be considered.  What can you do?  If you are overweight or obese and are interested in methods for weight loss, you should discuss this with your provider.     Consider reducing daily calorie intake by 500 calories.     Keep a food journal.     Avoiding skipping meals, consider cutting portions instead.    Diet combined with exercise helps maintain muscle while optimizing fat loss. Strength training is particularly important for building and maintaining muscle mass. Exercise helps reduce stress, increase energy, and improves fitness. Increasing exercise without diet control, however, may not burn enough calories to loose weight.       Start walking three days a week 10-20 minutes at a time    Work towards walking thirty minutes five days a week     Eventually, increase the speed of your walking for 1-2 minutes at time    In addition, we recommend that you review healthy lifestyles and methods for weight loss available through the National Institutes of Health patient information sites:  http://win.niddk.nih.gov/publications/index.htm    And look into health and wellness programs that may be available through your health insurance provider, employer, local community  [place], or [facility].          Surgery:    Surgery for obstructive sleep apnea is considered generally only when other therapies fail to work. Surgery may be discussed with you if you are having a difficult time tolerating CPAP and or when there is an abnormal structure that requires surgical correction.  Nose and throat surgeries often enlarge the airway to prevent collapse.  Most of these surgeries create pain for 1-2 weeks and up to half of the most common surgeries are not effective throughout life.  You should carefully discuss the benefits and drawbacks to surgery with your sleep provider and surgeon to determine if it is the best solution for you.   More information  Surgery for RASHIDA is directed at areas that are responsible for narrowing or complete obstruction of the airway during sleep.  There are a wide range of procedures available to enlarge and/or stabilize the airway to prevent blockage of breathing in the three major areas where it can occur: the palate, tongue, and nasal regions.  Successful surgical treatment depends on the accurate identification of the factors responsible for obstructive sleep apnea in each person.  A personalized approach is required because there is no single treatment that works well for everyone.  Because of anatomic variation, consultation with an examination by a sleep surgeon is a critical first step in determining what surgical options are best for each patient.  In some cases, examination during sedation may be recommended in order to guide the selection of procedures.  Patients will be counseled about risks and benefits as well as the typical recovery course after surgery. Surgery is typically not a cure for a person s RASHIDA.  However, surgery will often significantly improve one s RASHIDA severity (termed  success rate ).  Even in the absence of a cure, surgery will decrease the cardiovascular risk associated with OSA7; improve overall quality of life8 (sleepiness,  functionality, sleep quality, etc).      Palate Procedures:  Patients with RASHIDA often have narrowing of their airway in the region of their tonsils and uvula.  The goals of palate procedures are to widen the airway in this region as well as to help the tissues resist collapse.  Modern palate procedure techniques focus on tissue conservation and soft tissue rearrangement, rather than tissue removal.  Often the uvula is preserved in this procedure. Residual sleep apnea is common in patient after pharyngoplasty with an average reduction in sleep apnea events of 33%2.      Tongue Procedures:  ExamWhile patients are awake, the muscles that surround the throat are active and keep this region open for breathing. These muscles relax during sleep, allowing the tongue and other structures to collapse and block breathing.  There are several different tongue procedures available.  Selection of a tongue base procedure depends on characteristics seen on physical exam.  Generally, procedures are aimed at removing bulky tissues in this area or preventing the back of the tongue from falling back during sleep.  Success rates for tongue surgery range from 50-62%3.    Hypoglossal Nerve Stimulation:  Hypoglossal nerve stimulation has recently received approval from the [place] Food and Drug Administration for the treatment of obstructive sleep apnea.  This is based on research showing that the system was safe and effective in treating sleep apnea6.  Results showed that the median AHI score decreased 68%, from 29.3 to 9.0. This therapy uses an implant system that senses breathing patterns and delivers mild stimulation to airway muscles, which keeps the airway open during sleep.  The system consists of three fully implanted components: a small generator (similar in size to a pacemaker), a breathing sensor, and a stimulation lead.  Using a small handheld remote, a patient turns the therapy on before bed and off upon awakening.     Candidates for this device must be greater than 22 years of age, have moderate to severe RASHIDA (AHI between 20-65), BMI less than 32, have tried CPAP/oral appliance without success, and have appropriate upper airway anatomy (determined by a sleep endoscopy performed by Dr. Rebolledo).    Hypoglossal Nerve Stimulation Pathway:    The sleep surgeon s office will work with the patient through the insurance prior-authorization process (including communications and appeals).    Nasal Procedures:  Nasal obstruction can interfere with nasal breathing during the day and night.  Studies have shown that relief of nasal obstruction can improve the ability of some patients to tolerate positive airway pressure therapy for obstructive sleep apnea1.  Treatment options include medications such as nasal saline, topical corticosteroid and antihistamine sprays, and oral medications such as antihistamines or decongestants. Non-surgical treatments can include external nasal dilators for selected patients. If these are not successful by themselves, surgery can improve the nasal airway either alone or in combination with these other options.      Combination Procedures:  Combination of surgical procedures and other treatments may be recommended, particularly if patients have more than one area of narrowing or persistent positional disease.  The success rate of combination surgery ranges from 66-80%2,3.    References  1. Brigida AVALOS. The Role of the Nose in Snoring and Obstructive Sleep Apnoea: An Update.  Eur Arch Otorhinolaryngol. 2011; 268: 1365-73.  2.  Erna SM; Antwan JA; Andrea JR; Pallanch JF; Brenda MB; Nori SG; Luz Marina OSORIO. Surgical modifications of the upper airway for obstructive sleep apnea in adults: a systematic review and meta-analysis. SLEEP 2010;33(10):3674-8450. Melody BOO. Hypopharyngeal surgery in obstructive sleep apnea: an evidence-based medicine review.  Arch Otolaryngol Head Neck Surg. 2006 Feb;132(2):206-13.  3. Jose L  YH1, Jan Y, Ishmael ISAIAH. The efficacy of anatomically based multilevel surgery for obstructive sleep apnea. Otolaryngol Head Neck Surg. 2003 Oct;129(4):327-35.  4. Melody BOO, Goldberg A. Hypopharyngeal Surgery in Obstructive Sleep Apnea: An Evidence-Based Medicine Review. Arch Otolaryngol Head Neck Surg. 2006 Feb;132(2):206-13.  5. Kurt JOSHI et al. Upper-Airway Stimulation for Obstructive Sleep Apnea.  N Engl J Med. 2014 Jan 9;370(2):139-49.  6. Jesica Y et al. Increased Incidence of Cardiovascular Disease in Middle-aged Men with Obstructive Sleep Apnea. Am J Respir Crit Care Med; 2002 166: [phone]  7. Rhona COLVIN et al. Studying Life Effects and Effectiveness of Palatopharyngoplasty (SLEEP) study: Subjective Outcomes of Isolated Uvulopalatopharyngoplasty. Otolaryngol Head Neck Surg. 2011; 144: 623-631.    Please do not drive if drowsy or sleepy;  pull over if drowsy.                  Follow-ups after your visit        Follow-up notes from your care team     Return in about 1 week (around 9/4/2017).      Your next 10 appointments already scheduled     Aug 31, 2017   Procedure with Eneida Monatno MD   [facility] [place], Endoscopy ([facility])    [address] [address]   [phone]           [facility] is located on the corner of [address] and [address] [facility] on the [facility]. It is easily accessible from virtually any point in the [place] area, via [id number]            Sep 14, 2017  8:00 PM CDT   PSG Split with SLEEP STUDY  3   [facility] [place], Sleep Study ([facility])    [address] [place] [id number] [phone]            Sep 19, 2017 11:15 AM CDT   (Arrive by 11:00 AM)   Return General Liver with Gentry Ramirez MD   [facility] Hepatology ([facility])    [address]  3rd Floor  [facility]  [address]   [phone]            Oct 02, 2017  2:00 PM CDT   Return Sleep Patient with Vero Quiñonez MD   [facility] ([facility])    [address] [place] [id number] [phone]              Future tests that were ordered for you today     Open Future Orders        Priority Expected Expires Ordered    Comprehensive metabolic panel Routine  8/29/2018 8/29/2017    Lipid panel reflex to direct LDL Routine  8/29/2018 8/29/2017    Ferritin Routine  10/28/2017 8/29/2017    Comprehensive Sleep Study Routine  2/24/2018 8/28/2017            Who to contact     If you have questions or need follow up information about today's clinic visit or your schedule please contact [facility] directly at [phone].  Normal or non-critical lab and imaging results will be communicated to you by MyChart, letter or phone within 4 business days after the clinic has received the results. If you do not hear from us within 7 days, please contact the clinic through [employer] or phone. If you have a critical or abnormal lab result, we will notify you by phone as soon as possible.  Submit refill requests through [employer] or call your pharmacy and they will forward the refill request to us. Please allow 3 business days for your refill to be completed.          Additional Information About Your Visit        [employer] Information     [employer] gives you secure access to your electronic health record. If you see a primary care provider, you can also send messages to your care team and make appointments. If you have questions, please call your primary care clinic.  If you do not have a primary care provider, please call [phone] and they will assist you.        Care EveryWhere ID     This is your Care EveryWhere ID. This could be used by other organizations to access your [facility] medical records  [id number]        Your Vitals Were   "   Pulse Respirations Height Pulse Oximetry BMI (Body Mass Index)       67 16 1.715 m (5' 7.5\") 100% 28.86 kg/m2        Blood Pressure from Last 3 Encounters:   08/28/17 155/75   08/16/17 187/84   08/14/17 163/83    Weight from Last 3 Encounters:   08/28/17 84.8 kg (187 lb)   08/16/17 83.6 kg (184 lb 6.4 oz)   08/14/17 84.2 kg (185 lb 9.6 oz)              We Performed the Following     SLEEP EVALUATION & MANAGEMENT REFERRAL - ADULT          Today's Medication Changes          These changes are accurate as of: 8/28/17 11:59 PM.  If you have any questions, ask your nurse or doctor.               Start taking these medicines.        Dose/Directions    zolpidem 5 MG tablet   Commonly known as:  AMBIEN        Take tablet by mouth 15 minutes prior to sleep, for Sleep Study   Quantity:  1 tablet   Refills:  0         These medicines have changed or have updated prescriptions.        Dose/Directions    ezetimibe 10 MG tablet   Commonly known as:  ZETIA   This may have changed:    - when to take this  - additional instructions   Used for:  Hyperlipidemia, unspecified hyperlipidemia type        Dose:  10 mg   Take 1 tablet (10 mg) by mouth daily , or as directed by Dr. Reyna.   Quantity:  90 tablet   Refills:  3       PRESERVISION AREDS 2 Caps   This may have changed:    - how much to take  - when to take this   Used for:  Dry eyes, bilateral        Dose:  2 capsule   Take 2 capsules by mouth daily   Refills:  0       triamcinolone 0.1 % cream   Commonly known as:  KENALOG   This may have changed:    - when to take this  - reasons to take this  - additional instructions   Used for:  Contact dermatitis and other eczema, due to unspecified cause        Apply topically 2 times daily Apply to rash   Quantity:  80 g   Refills:  3            Where to get your medicines      Some of these will need a paper prescription and others can be bought over the counter.  Ask your nurse if you have questions.     Bring a paper prescription " for each of these medications     zolpidem 5 MG tablet                Primary Care Provider Office Phone # Fax #    Jennifer Amparo Barraza [phone] [address]  [facility] [address]        Equal Access to Services     GENE NOWAK : Hadii aad ku hadasho Soomaali, waaxda luqadaha, qaybta kaalmada adeegyada, waxay idiin hayaan adeeg khcarlossh ladanten ah. So [facility] [phone].    ATENCIÓN: Si habla español, tiene a jason disposición servicios gratuitos de asistencia lingüística. Llame al [phone].    We comply with applicable federal civil rights laws and [place] laws. We do not discriminate on the basis of race, color, national origin, age, disability sex, sexual orientation or gender identity.            Thank you!     Thank you for choosing [facility] [place], SLEEP STUDY  for your care. Our goal is always to provide you with excellent care. Hearing back from our patients is one way we can continue to improve our services. Please take a few minutes to complete the written survey that you may receive in the mail after your visit with us. Thank you!             Your Updated Medication List - Protect others around you: Learn how to safely use, store and throw away your medicines at www.disposemymeds.org.          This list is accurate as of: 8/28/17 11:59 PM.  Always use your most recent med list.                   Brand Name Dispense Instructions for use Diagnosis    acetaminophen 500 MG Caps      Take 500 mg by mouth as needed Take 500-1,000 mg by mouth every 6 hours if needed.        apixaban ANTICOAGULANT 2.5 MG tablet    ELIQUIS    180 tablet    Take 1 tablet (2.5 mg) by mouth 2 times daily    Atrial fibrillation, unspecified type (H)       ARTIFICIAL TEARS OP      Apply 1 drop to eye as needed        BENEFIBER Powd      2 teaspoons daily        calcitRIOL 0.5 MCG capsule    ROCALTROL    90 capsule    Take 1 capsule (0.5 mcg) by mouth daily        clotrimazole 1 % cream    LOTRIMIN      Apply topically 2 times daily as needed Reported on 4/25/2017        ezetimibe 10 MG tablet    ZETIA    90 tablet    Take 1 tablet (10 mg) by mouth daily , or as directed by Dr. Reyna.    Hyperlipidemia, unspecified hyperlipidemia type       folic acid 1 MG tablet    FOLVITE    100 tablet    Take 1 tablet (1 mg) by mouth daily    Liver replaced by transplant (H)       furosemide 20 MG tablet    LASIX    46 tablet    Take 0.5 tablets (10 mg) by mouth daily    CKD (chronic kidney disease) stage 3, GFR 30-59 ml/min, Liver replaced by transplant (H)       hydrALAZINE 25 MG tablet    APRESOLINE    270 tablet    Take 0.5 tablets (12.5 mg) by mouth 3 times daily as needed , if systolic blood pressure is more than 140 mmHg.    HTN (hypertension)       levothyroxine 150 MCG tablet    SYNTHROID/LEVOTHROID    96 tablet    Take one tablet daily 6 days a week and one and a half tablets one day a week.    Postablative hypothyroidism       magnesium 250 MG tablet      Take 2 tablets by mouth daily At night.        meclizine 25 MG tablet    ANTIVERT    30 tablet    Take 1 tablet (25 mg) by mouth as needed    Liver replaced by transplant (H)       metoprolol 50 MG 24 hr tablet    TOPROL-XL    90 tablet    Take 1 tablet (50 mg) by mouth daily    Paroxysmal atrial fibrillation (H)       predniSONE 5 MG tablet    DELTASONE    90 tablet    Take 1 tablet (5 mg) by mouth daily    Thyroid cancer (H), Liver replaced by transplant (H)       PRESERVISION AREDS 2 Caps      Take 2 capsules by mouth daily    Dry eyes, bilateral       PROAIR  (90 BASE) MCG/ACT Inhaler   Generic drug:  albuterol      Inhale 2 puffs into the lungs every 4 hours as needed for shortness of breath / dyspnea or wheezing Reported on 4/25/2017        sirolimus Tabs tablet     15 tablet    Take 0.5 mg by mouth every other day    Liver replaced by transplant (H)       STATIN NOT PRESCRIBED (INTENTIONAL)     0 each    Statin not prescribed intentionally due to  Intolerance (with supporting documentation of trying a statin at least once within the last 5 years)    Statin intolerance       STATIN NOT PRESCRIBED (INTENTIONAL)      Please choose reason not prescribed, below    Statin intolerance       SUMAtriptan 50 MG tablet    IMITREX    30 tablet    Take 1 tablet (50 mg) by mouth at onset of headache for migraine repeat after 2 hours if needed.    Thyroid cancer (H)       triamcinolone 0.1 % cream    KENALOG    80 g    Apply topically 2 times daily Apply to rash    Contact dermatitis and other eczema, due to unspecified cause       ursodiol 250 MG tablet    ACTIGALL    180 tablet    Take 1 tablet (250 mg) by mouth 2 times daily    Liver replaced by transplant (H)       voriconazole 200 MG tablet    VFEND    60 tablet    Take 1 tablet (200 mg) by mouth 2 times daily    Coccidioidal pneumonitis (H)       zolpidem 5 MG tablet    AMBIEN    1 tablet    Take tablet by mouth 15 minutes prior to sleep, for Sleep Study

## 2017-08-29 ENCOUNTER — CARE COORDINATION (OUTPATIENT)
Dept: CARDIOLOGY | Facility: CLINIC | Age: 67
End: 2017-08-29

## 2017-08-29 DIAGNOSIS — E78.5 HYPERLIPIDEMIA LDL GOAL <100: Primary | ICD-10-CM

## 2017-08-29 NOTE — PROGRESS NOTES
"Contacted patient to review recent lab results.  She started Zetia on July 12th and has been on titration schedule. She is due to increase her dose to 10 mg once daily on September 12th, and intends on having labs early October.  Her biggest concern at this time is her blood pressure.  She only takes her blood pressure in the morning, and nearly all days her blood pressure is above 140 mmHg before taking her meds.  \"I am not going to take my blood pressure a bunch of times every day.\"  Patient is agreeable to taking hydralazine 12.5 mg once every morning, and starting to check her blood pressure every evening. She has only been taking her blood pressure once daily and therefor never takes hydralazine more than once daily.  She will report home blood pressure values in 1-2 weeks.  Denies questions or concerns at this time.     "

## 2017-08-29 NOTE — PROGRESS NOTES
"Sleep Consultation Note:    Date on this visit: 8/28/2017    Dr. Eden Clinton has requested  sleep consultation for evaluation for possible sleep apnea.    Primary Physician: Jennifer Barraza     CC:  \"set off oximeter repeatedly while at the emergency room last summer and I was suggested to get to sleep test\".    Luz Thompson 67 year old with PMH of paroxysmal  atrial fibrillation, HTN, s/p liver transplantation, anxiety, anemia of chronic disease, osteoporosis and hypothyroidism, presents to sleep clinic today for evaluation for possible sleep apnea.    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 She  had Lexiscan and ECHO which were normal.  Subsequently she had 2 episodes of palpitations associated with SOB and anxiety at home this summer.     She underwent a sleep study more than 15 years ago. She wasn't  sure where she may have had the sleep study and she does not have the reports of the sleep study. She was informed that she had very mild sleep apnea almost on the borderline range and no treatment was recommended at that time and she never pursued any follow-up since then.    Sleep Disordered Breathing  Virginia does complain of snoring, snort arousals, waking up coughing sometimes, witnessed apneas, dry mouth and fatigue  around 3 PM for which she usually takes a protein drink boost.    Sleep Schedule/Sleep Complaints  She retired 3 years ago. Her schedule is the same weekday or weekend. She goes to bed at 11 PM. It takes 10 minutes for her to fall asleep. She reports wanted to nocturnal 1-2  nocturnal  Awakenings, usually to use the bathroom or because she's cold. Occasionally she reports difficulty re-initiating sleep following the nocturnal awakening. She attributes it to active mind. She gets up and goes to the living room, reads  for some time on her recliner and sometimes she falls asleep " in the recliner. She wakes up spontaneously between 8-830 AM and she uses an alarm if she has to wake up at an earlier time. She feels refreshed most mornings upon awakening. She reports fatigue in the afternoon hours around 3 PM and she has a protein drink-boost that helps her. She does not take naps during the day    She sometimes reads in the bad and plays games on her Mary before bed.     She denies excessive daytime sleepiness. She endorses an [place] sleepiness score of 6 out of 24. She denies any concerns about drowsiness while driving.     Virginia she reports achy feeling in her legs and ankles, describes it as a muscle pain. She has been experiencing this for several years. These symptoms occur at night, and have occasionally awakened her from sleep. The symptoms occur at a  frequency of 3 to 4 times per week. She uses an electrical mattress pad /topical lotion that helps.    Patient has a regular bed partner.    Sleep Behaviors  She denies any cataplexy, sleep paralysis, sleep hallucinations.    She denies any night time behaviors - sleep walking, sleep talking, sleep eating.    She does not complain acting out dreams.      Social History  Virginia retired 3 years ago. She lives with her .    She does drink one big cup of coffee in the morning . Last caffeine intake is not within 6 hours of bed time.  She drinks alcohol very rarely.  Patient is a former smoker for 17 years and she quit in 1994.  Patient does not use drugs.    Allergies:    Allergies   Allergen Reactions     Fluconazole Hives and Itching     Azithromycin Itching     Benadryl [Diphenhydramine Hcl]      Insomnia      Cefpodoxime Itching     Cellcept Diarrhea     Ciprofloxacin Other (See Comments)     Insomnia, mood lability     Codeine      Psych disturbance     Diphenhydramine Other (See Comments)     Doxycycline      Lansoprazole Diarrhea     Levaquin [Levofloxacin] Other (See Comments)     Headache, hyperactivity     Lisinopril  Cough     Methotrexate      Sores     Morphine Itching     Morphine Sulfate Itching     Mycophenolate Diarrhea     Pepcid Diarrhea     Pravastatin Muscle Pain (Myalgia)     Prevacid Diarrhea     Ranitidine Diarrhea     Simvastatin Muscle Pain (Myalgia)     severe     Zantac Diarrhea     Cephalexin Rash     Fever and skin burning     Penicillin G Itching and Rash     Penicillins Rash     Tolectin [Nsaids] Rash     Tramadol Rash       Medications:    Current Outpatient Prescriptions   Medication Sig Dispense Refill     zolpidem (AMBIEN) 5 MG tablet Take tablet by mouth 15 minutes prior to sleep, for Sleep Study 1 tablet 0     metoprolol (TOPROL-XL) 50 MG 24 hr tablet Take 1 tablet (50 mg) by mouth daily 90 tablet 3     apixaban ANTICOAGULANT (ELIQUIS) 2.5 MG tablet Take 1 tablet (2.5 mg) by mouth 2 times daily 180 tablet 3     folic acid (FOLVITE) 1 MG tablet Take 1 tablet (1 mg) by mouth daily 100 tablet 3     ezetimibe (ZETIA) 10 MG tablet Take 1 tablet (10 mg) by mouth daily , or as directed by Dr. Reyna. (Patient taking differently: Take 10 mg by mouth every other day , or as directed by Dr. Reyna.) 90 tablet 3     furosemide (LASIX) 20 MG tablet Take 0.5 tablets (10 mg) by mouth daily 46 tablet 3     ASPIRIN NOT PRESCRIBED (INTENTIONAL) Please choose reason not prescribed, below 0 each 0     STATIN NOT PRESCRIBED, INTENTIONAL, Please choose reason not prescribed, below       hydrALAZINE (APRESOLINE) 25 MG tablet Take 0.5 tablets (12.5 mg) by mouth 3 times daily as needed , if systolic blood pressure is more than 140 mmHg. 270 tablet 3     levothyroxine (SYNTHROID/LEVOTHROID) 150 MCG tablet Take one tablet daily 6 days a week and one and a half tablets one day a week. 96 tablet 3     calcitRIOL (ROCALTROL) 0.5 MCG capsule Take 1 capsule (0.5 mcg) by mouth daily 90 capsule 3     voriconazole (VFEND) 200 MG tablet Take 1 tablet (200 mg) by mouth 2 times daily 60 tablet 11     predniSONE (DELTASONE) 5 MG tablet  Take 1 tablet (5 mg) by mouth daily 90 tablet 3     RAPAMUNE 0.5 MG PO TABLET Take 0.5 mg by mouth every other day 15 tablet 11     albuterol (ALBUTEROL) 108 (90 BASE) MCG/ACT Inhaler Inhale 2 puffs into the lungs every 4 hours as needed for shortness of breath / dyspnea or wheezing Reported on 4/25/2017       clotrimazole (LOTRIMIN) 1 % cream Apply topically 2 times daily as needed Reported on 4/25/2017       ursodiol (ACTIGALL) 250 MG tablet Take 1 tablet (250 mg) by mouth 2 times daily 180 tablet 3     Wheat Dextrin (BENEFIBER) POWD 2 teaspoons daily       SUMAtriptan (IMITREX) 50 MG tablet Take 1 tablet (50 mg) by mouth at onset of headache for migraine repeat after 2 hours if needed. 30 tablet 3     meclizine (ANTIVERT) 25 MG tablet Take 1 tablet (25 mg) by mouth as needed 30 tablet 11     STATIN NOT PRESCRIBED, INTENTIONAL, Statin not prescribed intentionally due to Intolerance (with supporting documentation of trying a statin at least once within the last 5 years) 0 each 0     Multiple Vitamins-Minerals (PRESERVISION AREDS 2) CAPS Take 2 capsules by mouth daily (Patient taking differently: Take 1 capsule by mouth 2 times daily )       triamcinolone (KENALOG) 0.1 % cream Apply topically 2 times daily Apply to rash (Patient taking differently: Apply topically 2 times daily as needed Apply to rash) 80 g 3     Hypromellose (ARTIFICIAL TEARS OP) Apply 1 drop to eye as needed       acetaminophen 500 MG CAPS Take 500 mg by mouth as needed Take 500-1,000 mg by mouth every 6 hours if needed.       magnesium 250 MG tablet Take 2 tablets by mouth daily At night.          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     Priority: Medium     High risk medication use 05/24/2016     Priority: Medium     Statin intolerance 05/24/2016     Priority: Medium     Prophylactic antibiotic 03/05/2014     Priority: Medium     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 Gibson, urologist        Advanced directives, counseling/discussion 09/27/2012     Priority: Low     Discussed advance care planning with patient; information given to patient to review. 9/27/2012          HDL deficiency 09/25/2012     Priority: Low        Past Medical/Surgical History:  Past Medical History:   Diagnosis Date     Anemia of other chronic disease 10/17/2011     Anxiety      Bladder infection, chronic 4/4/2012     CKD (chronic kidney disease) stage 3, GFR 30-59 ml/min 4/4/2012     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     Diverticulosis of sigmoid colon 12/21/2013     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     Received Rituxan during Summer of 2016     Glaucoma      H/O esophageal varices      Hearing loss      Heart murmur 4/4/2012     History of DVT (deep vein thrombosis)      [facility]      History of thyroid cancer 9/25/2012     Hyperlipidemia 4/10/2012    Says that she does not have it anymore, not on meds     Hypertension goal BP (blood pressure) < 140/80 11/6/2013     Hypertriglyceridemia      Liver replaced by transplant (H) 10/17/2011    Dr. Gentry Ramirez, [facility]       Macular degeneration       "Migraines 4/4/2012     Osteoarthritis of right knee 8/2/2012     Osteoporosis 4/20/2012     Paroxysmal a-fib (H) 6/13/2017     Post-surgical hypothyroidism      Postablative hypothyroidism 8/13/2012     Primary biliary cirrhosis (H)     s/p Liver transplant, [id number]     Sjogren's syndrome (H)      Statin intolerance      Unspecified cerebral artery occlusion with cerebral infarction 2001    when BP was very low, small multiple infacts in frontal lobe, had \"visual field cut,\" leg weakness, and expressive aphasia - all have resolved.      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     Past Surgical History:   Procedure Laterality Date     APPENDECTOMY  1961     BIOPSY       CATARACT IOL, RT/LT      RE12/19/2013, LE12/10/2013 - Toric lenses     CHOLECYSTECTOMY  1991     COLONOSCOPY       COLONOSCOPY  3/10/2014    Procedure: COLONOSCOPY;;  Surgeon: Gentry Ramirez MD;  Location: [facility]     ENDOSCOPIC RETROGRADE CHOLANGIOPANCREATOGRAM  9/19/2013    Procedure: ENDOSCOPIC RETROGRADE CHOLANGIOPANCREATOGRAM;  Endoscopic Retrograde Cholangiopancreatogram with single balloon enteroscopy, ballon sweep of bile duct;  Surgeon: Brett Membreno MD;  Location: [facility] OR     ENT SURGERY      ear drum repair     HC KNEE SCOPE,MED/LAT MENISECTOMY  8/10/12    Right, partial medial menisectomy only     KNEE SURGERY  1966    R knee     PICC INSERTION  9/18/2013    4fr SL PASV PICC, 40cm (1cm external) in the R basilic vein w/ tip in the low SVC     PICC INSERTION  2/21/2014    5 fr DL BioFlo Navilyst PICC, 46 cm (3 cm external) in the L basilic vein w/ tip in the SVC RA junction.     THYROIDECTOMY  3/2010     TRANSPLANT LIVER RECIPIENT LIVING UNRELATED         Social History:  Social History     Social History     Marital status:      Spouse name: Robbin Thompson     Number of children: 4     Years of education: 20     Occupational History     [employer]  [employer] [facility]     social work      Self     Social History Main Topics     Smoking status: Former Smoker     Packs/day: 1.00     Years: 18.00     Types: Cigarettes     Quit date: 1985     Smokeless tobacco: Never Used     Alcohol use 0.0 oz/week     0 Standard drinks or equivalent per week      Comment: rare - \"I toast at weddings\"     Drug use: No     Sexual activity: Yes     Partners: Male     Birth control/ protection: Post-menopausal     Other Topics Concern     Exercise Yes     cardio and strengthing     Social History Narrative    She is retired. She and her  travel around the [place] in an [place]. [place] are in the [place] the course of the [place]. [place], [place], corn and soybean crops.       Family History:   Family History   Problem Relation Age of Onset     Hypertension Mother      Endometrial Cancer Mother      Hyperlipidemia Mother      Prostate Cancer Father      Macular Degeneration Father      Cancer - colorectal Maternal Grandmother      in her 80's, has surgery and removal of part of kidney,  at age 98     HEART DISEASE Maternal Grandfather       at 98     Glaucoma Maternal Grandfather      CEREBROVASCULAR DISEASE Paternal Grandmother      in her 80's     Hypertension Paternal Grandmother      HEART DISEASE Paternal Grandfather      MI     Alzheimer Disease Paternal Grandfather      Allergies Son      Neurologic Disorder Daughter      Migraines     Breast Cancer Other      Anesthesia Reaction No family hx of      Crohn Disease No family hx of      Ulcerative Colitis No family hx of      Family history pertinent to sleep disorders: father snored.      Review of Systems:  A complete review of systems reviewed by me is negative with the exeption of what has been highlighted below in red and mentioned in the history of present illness:  CONSTITUTIONAL: NEGATIVE for weight gain/loss, fever, chills, " "sweats or night sweats, drug allergies.  EYES: NEGATIVE for changes in vision, blind spots, double vision.  ENT: NEGATIVE for ear pain, sore throat, sinus pain, post-nasal drip, runny nose, bloody nose  CARDIAC: swollen legs or swollen feet and high blood pressure. NEGATIVE for fast heartbeats or fluttering in chest, chest pain or pressure, breathlessness when lying flat.   NEUROLOGIC: NEGATIVE headaches, weakness or numbness in the arms or legs.  DERMATOLOGIC: NEGATIVE for rashes, new moles or change in mole(s)  PULMONARY: SOB with activity, dry cough; NEGATIVE SOB at rest,  productive cough, coughing up blood, wheezing or whistling when breathing.    GASTROINTESTINAL: NEGATIVE for nausea or vomitting, loose or watery stools, fat or grease in stools, constipation, abdominal pain, bowel movements black in color or blood noted.  GENITOURINARY: NEGATIVE for pain during urination, blood in urine, urinating more frequently than usual  MUSCULOSKELETAL: muscle pain, NEGATIVE for bone or joint pain, swollen joints.  ENDOCRINE: NEGATIVE for increased thirst or urination, diabetes.  LYMPHATIC: NEGATIVE for swollen lymph nodes, lumps or bumps in the breasts or nipple discharge.  PSYCHE: anxiety; NEGATIVE for depression,     Physical Examination:  Vitals: /75  Pulse 67  Resp 16  Ht 1.715 m (5' 7.5\")  Wt 84.8 kg (187 lb)  SpO2 100%  BMI 28.86 kg/m2  BMI= Body mass index is 28.86 kg/(m^2).       [place] Total Score 8/28/2017   Total score - [place] 6     General: No apparent distress, appropriately groomed  Head: Normocephalic, atraumatic  Eyes: no icterus, PERRL  Nose: Nares patent. No exudate/erythema. No septal deviation noted.  Mouth: op pink and moist, teeth:she has upper and lower dentures  Orophraynx: Opening is narrowed, uvula: thick   Mallampati Class:  IV                No tonsillar hypertrophy  Neck: Supple, Circumference: 15\"  Cardiac: Regular rate and rhythm  Chest: Symmetric air movement, lungs clear to " auscultation bilaterally  Musculoskeletal: no pedal edema/calf tenderness.  Skin: Warm, dry, intact  Psych: Mood pleasant, affect congruent  Neuro:Awake, alert, attentive, oriented. Gait normal; no focal neurological deficits.      Impression/Plan:    Snoring and witnessed apneas, possible Obstructive sleep apnea  Paroxysmal atrial fibrillation  HTN  Restless legs syndrome  Maintenance  insomnia    Patient is being evaluated for RASHIDA.  STOP BANG score is 5. Patient likely has sleep apnea based on clinical history, postmenopausal status and oropharyngeal anatomy.  Recommend split night sleep study, split if AHI is more than 20 per hr.  Diagnosis and treatment for RASHIDA have been discussed. Complications of untreated RASHIDA have also been discussed.    History of paroxysmal atrial fibrillation. We discussed the association of obstructive sleep apnea with atrial fibrillation and the risk of recurrent atrial fibrillation in patients with untreated RASHIDA.    Hypertension: her systolic blood pressure is elevated. The dose of metoprolol was increased to 50 mg daily by her cardiologist a few weeks ago. Recommended to continue monitoring the blood pressure and follow up with her cardiologist.  Her symptoms are suggestive of Restless legs syndrome.  Will check fasting serum ferritin level. She would like to get the tests done along with her other lab tests through her primary care clinic.  Will follow the results and consider iron supplementation if less than 75ng/mL. Encouraged to try non-pharmacological treatments such as hot bath/shower, stretching, heating pads.    Maintenance insomnia: Strongly encouraged to follow good sleep hygiene/behavioral techniques and be discussed stimulus control measures.    Patient is a former smoker and has been encouraged to continue to not smoke.    Encourage weight loss, healthy diet, and exercise.    Patient was strongly advised to avoid driving, operating any heavy machinery or other hazardous  "situations while drowsy or sleepy.  Patient was counseled on the importance of driving while alert, to pull over if drowsy, or nap before getting into the vehicle if sleepy.      She will follow up with me in approximately two weeks after her sleep study has been competed to review the results and discuss plan of care.        CC: Eden Clinton    Chart documentation done in part with Dragon Voice recognition Software. Although reviewed after completion, some word and grammatical error may remain.    \"I spent a total of 60 minutes face to face with Luz Thompson during today's office visit. Over 50% of this time was spent counseling the patient and  coordinating care regarding sleep apnea, insomnia,  and RLS.\"       Vero Quiñonez MD   of Medicine,  Division of Pulmonary/Sleep Medicine  [facility].                "

## 2017-08-31 ENCOUNTER — APPOINTMENT (OUTPATIENT)
Dept: CT IMAGING | Facility: CLINIC | Age: 67
DRG: 393 | End: 2017-08-31
Attending: INTERNAL MEDICINE
Payer: MEDICARE

## 2017-08-31 ENCOUNTER — HOSPITAL ENCOUNTER (INPATIENT)
Facility: CLINIC | Age: 67
LOS: 8 days | Discharge: HOME | DRG: 393 | End: 2017-09-08
Attending: INTERNAL MEDICINE | Admitting: COLON & RECTAL SURGERY
Payer: MEDICARE

## 2017-08-31 ENCOUNTER — ANESTHESIA (OUTPATIENT)
Dept: GASTROENTEROLOGY | Facility: CLINIC | Age: 67
DRG: 393 | End: 2017-08-31
Payer: MEDICARE

## 2017-08-31 ENCOUNTER — TELEPHONE (OUTPATIENT)
Dept: TRANSPLANT | Facility: CLINIC | Age: 67
End: 2017-08-31

## 2017-08-31 ENCOUNTER — ANESTHESIA EVENT (OUTPATIENT)
Dept: SURGERY | Facility: CLINIC | Age: 67
End: 2017-08-31

## 2017-08-31 ENCOUNTER — ANESTHESIA (OUTPATIENT)
Dept: SURGERY | Facility: CLINIC | Age: 67
End: 2017-08-31

## 2017-08-31 DIAGNOSIS — Z94.4 LIVER REPLACED BY TRANSPLANT (H): Primary | ICD-10-CM

## 2017-08-31 PROBLEM — K63.1 PERFORATION BOWEL (H): Status: ACTIVE | Noted: 2017-08-31

## 2017-08-31 LAB
ABO + RH BLD: NORMAL
ABO + RH BLD: NORMAL
ALBUMIN SERPL-MCNC: 3.1 G/DL (ref 3.4–5)
ALBUMIN UR-MCNC: 30 MG/DL
ALP SERPL-CCNC: 233 U/L (ref 40–150)
ALT SERPL W P-5'-P-CCNC: 50 U/L (ref 0–50)
ANION GAP SERPL CALCULATED.3IONS-SCNC: 4 MMOL/L (ref 3–14)
APPEARANCE UR: ABNORMAL
AST SERPL W P-5'-P-CCNC: 31 U/L (ref 0–45)
BACTERIA #/AREA URNS HPF: ABNORMAL /HPF
BASOPHILS # BLD AUTO: 0 10E9/L (ref 0–0.2)
BASOPHILS NFR BLD AUTO: 0.2 %
BILIRUB SERPL-MCNC: 0.3 MG/DL (ref 0.2–1.3)
BILIRUB UR QL STRIP: NEGATIVE
BLD GP AB SCN SERPL QL: NORMAL
BLOOD BANK CMNT PATIENT-IMP: NORMAL
BUN SERPL-MCNC: 22 MG/DL (ref 7–30)
CALCIUM SERPL-MCNC: 8.7 MG/DL (ref 8.5–10.1)
CHLORIDE SERPL-SCNC: 101 MMOL/L (ref 94–109)
CO2 SERPL-SCNC: 32 MMOL/L (ref 20–32)
COLONOSCOPY: NORMAL
COLOR UR AUTO: ABNORMAL
CREAT SERPL-MCNC: 1.17 MG/DL (ref 0.52–1.04)
DIFFERENTIAL METHOD BLD: ABNORMAL
EOSINOPHIL # BLD AUTO: 0 10E9/L (ref 0–0.7)
EOSINOPHIL NFR BLD AUTO: 0.3 %
ERYTHROCYTE [DISTWIDTH] IN BLOOD BY AUTOMATED COUNT: 16.4 % (ref 10–15)
GFR SERPL CREATININE-BSD FRML MDRD: 46 ML/MIN/1.7M2
GLUCOSE SERPL-MCNC: 128 MG/DL (ref 70–99)
GLUCOSE UR STRIP-MCNC: NEGATIVE MG/DL
HCT VFR BLD AUTO: 38.5 % (ref 35–47)
HGB BLD-MCNC: 12.4 G/DL (ref 11.7–15.7)
HGB UR QL STRIP: ABNORMAL
IMM GRANULOCYTES # BLD: 0 10E9/L (ref 0–0.4)
IMM GRANULOCYTES NFR BLD: 0.4 %
KETONES UR STRIP-MCNC: NEGATIVE MG/DL
LEUKOCYTE ESTERASE UR QL STRIP: ABNORMAL
LYMPHOCYTES # BLD AUTO: 1.3 10E9/L (ref 0.8–5.3)
LYMPHOCYTES NFR BLD AUTO: 11.8 %
MCH RBC QN AUTO: 27.6 PG (ref 26.5–33)
MCHC RBC AUTO-ENTMCNC: 32.2 G/DL (ref 31.5–36.5)
MCV RBC AUTO: 86 FL (ref 78–100)
MONOCYTES # BLD AUTO: 0.7 10E9/L (ref 0–1.3)
MONOCYTES NFR BLD AUTO: 6.8 %
MUCOUS THREADS #/AREA URNS LPF: PRESENT /LPF
NEUTROPHILS # BLD AUTO: 8.6 10E9/L (ref 1.6–8.3)
NEUTROPHILS NFR BLD AUTO: 80.5 %
NITRATE UR QL: NEGATIVE
NRBC # BLD AUTO: 0 10*3/UL
NRBC BLD AUTO-RTO: 0 /100
PH UR STRIP: 8 PH (ref 5–7)
PLATELET # BLD AUTO: 290 10E9/L (ref 150–450)
POTASSIUM SERPL-SCNC: 3.9 MMOL/L (ref 3.4–5.3)
PROT SERPL-MCNC: 8 G/DL (ref 6.8–8.8)
RBC # BLD AUTO: 4.5 10E12/L (ref 3.8–5.2)
RBC #/AREA URNS AUTO: 3 /HPF (ref 0–2)
SODIUM SERPL-SCNC: 138 MMOL/L (ref 133–144)
SOURCE: ABNORMAL
SP GR UR STRIP: 1.01 (ref 1–1.03)
SPECIMEN EXP DATE BLD: NORMAL
SQUAMOUS #/AREA URNS AUTO: 1 /HPF (ref 0–1)
UROBILINOGEN UR STRIP-MCNC: NORMAL MG/DL (ref 0–2)
WBC # BLD AUTO: 10.6 10E9/L (ref 4–11)
WBC #/AREA URNS AUTO: 2 /HPF (ref 0–2)

## 2017-08-31 PROCEDURE — 86900 BLOOD TYPING SEROLOGIC ABO: CPT | Performed by: SURGERY

## 2017-08-31 PROCEDURE — 25000125 ZZHC RX 250: Performed by: NURSE ANESTHETIST, CERTIFIED REGISTERED

## 2017-08-31 PROCEDURE — 40000141 ZZH STATISTIC PERIPHERAL IV START W/O US GUIDANCE

## 2017-08-31 PROCEDURE — 45382 COLONOSCOPY W/CONTROL BLEED: CPT | Performed by: INTERNAL MEDICINE

## 2017-08-31 PROCEDURE — 37000009 ZZH ANESTHESIA TECHNICAL FEE, EACH ADDTL 15 MIN: Performed by: INTERNAL MEDICINE

## 2017-08-31 PROCEDURE — 25000128 H RX IP 250 OP 636: Performed by: NURSE ANESTHETIST, CERTIFIED REGISTERED

## 2017-08-31 PROCEDURE — 74176 CT ABD & PELVIS W/O CONTRAST: CPT

## 2017-08-31 PROCEDURE — 25000128 H RX IP 250 OP 636: Performed by: INTERNAL MEDICINE

## 2017-08-31 PROCEDURE — 25000125 ZZHC RX 250: Performed by: PHYSICIAN ASSISTANT

## 2017-08-31 PROCEDURE — A9270 NON-COVERED ITEM OR SERVICE: HCPCS | Mod: GY | Performed by: PHYSICIAN ASSISTANT

## 2017-08-31 PROCEDURE — 85025 COMPLETE CBC W/AUTO DIFF WBC: CPT | Performed by: SURGERY

## 2017-08-31 PROCEDURE — 86901 BLOOD TYPING SEROLOGIC RH(D): CPT | Performed by: SURGERY

## 2017-08-31 PROCEDURE — 86850 RBC ANTIBODY SCREEN: CPT | Performed by: SURGERY

## 2017-08-31 PROCEDURE — 25000132 ZZH RX MED GY IP 250 OP 250 PS 637: Mod: GY | Performed by: PHYSICIAN ASSISTANT

## 2017-08-31 PROCEDURE — 36415 COLL VENOUS BLD VENIPUNCTURE: CPT | Performed by: SURGERY

## 2017-08-31 PROCEDURE — 37000008 ZZH ANESTHESIA TECHNICAL FEE, 1ST 30 MIN: Performed by: INTERNAL MEDICINE

## 2017-08-31 PROCEDURE — 80053 COMPREHEN METABOLIC PANEL: CPT | Performed by: SURGERY

## 2017-08-31 PROCEDURE — 25000128 H RX IP 250 OP 636: Performed by: PHYSICIAN ASSISTANT

## 2017-08-31 PROCEDURE — 81001 URINALYSIS AUTO W/SCOPE: CPT | Performed by: PHYSICIAN ASSISTANT

## 2017-08-31 PROCEDURE — 12000008 ZZH R&B INTERMEDIATE UMMC

## 2017-08-31 PROCEDURE — 0W3P8ZZ CONTROL BLEEDING IN GASTROINTESTINAL TRACT, VIA NATURAL OR ARTIFICIAL OPENING ENDOSCOPIC: ICD-10-PCS | Performed by: INTERNAL MEDICINE

## 2017-08-31 RX ORDER — PREDNISONE 5 MG/1
5 TABLET ORAL DAILY
Status: DISCONTINUED | OUTPATIENT
Start: 2017-09-01 | End: 2017-09-08 | Stop reason: HOSPADM

## 2017-08-31 RX ORDER — VORICONAZOLE 200 MG/1
200 TABLET, FILM COATED ORAL 2 TIMES DAILY
Status: DISCONTINUED | OUTPATIENT
Start: 2017-08-31 | End: 2017-08-31

## 2017-08-31 RX ORDER — ONDANSETRON 4 MG/1
4 TABLET, ORALLY DISINTEGRATING ORAL EVERY 6 HOURS PRN
Status: DISCONTINUED | OUTPATIENT
Start: 2017-08-31 | End: 2017-09-08 | Stop reason: HOSPADM

## 2017-08-31 RX ORDER — FENTANYL CITRATE 50 UG/ML
50 INJECTION, SOLUTION INTRAMUSCULAR; INTRAVENOUS ONCE
Status: DISCONTINUED | OUTPATIENT
Start: 2017-08-31 | End: 2017-08-31

## 2017-08-31 RX ORDER — MEROPENEM 1 G/1
1 INJECTION, POWDER, FOR SOLUTION INTRAVENOUS EVERY 6 HOURS
Status: DISCONTINUED | OUTPATIENT
Start: 2017-08-31 | End: 2017-09-05

## 2017-08-31 RX ORDER — EZETIMIBE 10 MG/1
10 TABLET ORAL EVERY OTHER DAY
Status: DISCONTINUED | OUTPATIENT
Start: 2017-08-31 | End: 2017-09-05

## 2017-08-31 RX ORDER — LIDOCAINE HYDROCHLORIDE 20 MG/ML
INJECTION, SOLUTION INFILTRATION; PERINEURAL PRN
Status: DISCONTINUED | OUTPATIENT
Start: 2017-08-31 | End: 2017-08-31

## 2017-08-31 RX ORDER — SODIUM CHLORIDE 9 MG/ML
INJECTION, SOLUTION INTRAVENOUS CONTINUOUS
Status: DISCONTINUED | OUTPATIENT
Start: 2017-08-31 | End: 2017-09-02

## 2017-08-31 RX ORDER — SUMATRIPTAN 50 MG/1
50 TABLET, FILM COATED ORAL
Status: DISCONTINUED | OUTPATIENT
Start: 2017-08-31 | End: 2017-09-08 | Stop reason: HOSPADM

## 2017-08-31 RX ORDER — HYDROMORPHONE HYDROCHLORIDE 1 MG/ML
.3-.5 INJECTION, SOLUTION INTRAMUSCULAR; INTRAVENOUS; SUBCUTANEOUS
Status: DISCONTINUED | OUTPATIENT
Start: 2017-08-31 | End: 2017-09-08 | Stop reason: HOSPADM

## 2017-08-31 RX ORDER — FENTANYL CITRATE 50 UG/ML
INJECTION, SOLUTION INTRAMUSCULAR; INTRAVENOUS PRN
Status: DISCONTINUED | OUTPATIENT
Start: 2017-08-31 | End: 2017-08-31

## 2017-08-31 RX ORDER — PROPOFOL 10 MG/ML
INJECTION, EMULSION INTRAVENOUS CONTINUOUS PRN
Status: DISCONTINUED | OUTPATIENT
Start: 2017-08-31 | End: 2017-08-31

## 2017-08-31 RX ORDER — PROCHLORPERAZINE MALEATE 5 MG
5 TABLET ORAL EVERY 6 HOURS PRN
Status: DISCONTINUED | OUTPATIENT
Start: 2017-08-31 | End: 2017-09-08 | Stop reason: HOSPADM

## 2017-08-31 RX ORDER — URSODIOL 250 MG/1
250 TABLET, FILM COATED ORAL
Status: DISCONTINUED | OUTPATIENT
Start: 2017-08-31 | End: 2017-09-08 | Stop reason: HOSPADM

## 2017-08-31 RX ORDER — FENTANYL CITRATE 50 UG/ML
50 INJECTION, SOLUTION INTRAMUSCULAR; INTRAVENOUS ONCE
Status: COMPLETED | OUTPATIENT
Start: 2017-08-31 | End: 2017-08-31

## 2017-08-31 RX ORDER — ONDANSETRON 2 MG/ML
4 INJECTION INTRAMUSCULAR; INTRAVENOUS EVERY 6 HOURS PRN
Status: DISCONTINUED | OUTPATIENT
Start: 2017-08-31 | End: 2017-09-08 | Stop reason: HOSPADM

## 2017-08-31 RX ORDER — METOPROLOL TARTRATE 1 MG/ML
5 INJECTION, SOLUTION INTRAVENOUS EVERY 6 HOURS
Status: DISCONTINUED | OUTPATIENT
Start: 2017-08-31 | End: 2017-09-03

## 2017-08-31 RX ORDER — PROCHLORPERAZINE 25 MG
12.5 SUPPOSITORY, RECTAL RECTAL EVERY 12 HOURS PRN
Status: DISCONTINUED | OUTPATIENT
Start: 2017-08-31 | End: 2017-09-08 | Stop reason: HOSPADM

## 2017-08-31 RX ORDER — PROPOFOL 10 MG/ML
INJECTION, EMULSION INTRAVENOUS PRN
Status: DISCONTINUED | OUTPATIENT
Start: 2017-08-31 | End: 2017-08-31

## 2017-08-31 RX ORDER — SODIUM CHLORIDE, SODIUM LACTATE, POTASSIUM CHLORIDE, CALCIUM CHLORIDE 600; 310; 30; 20 MG/100ML; MG/100ML; MG/100ML; MG/100ML
INJECTION, SOLUTION INTRAVENOUS CONTINUOUS PRN
Status: DISCONTINUED | OUTPATIENT
Start: 2017-08-31 | End: 2017-08-31

## 2017-08-31 RX ORDER — NALOXONE HYDROCHLORIDE 0.4 MG/ML
.1-.4 INJECTION, SOLUTION INTRAMUSCULAR; INTRAVENOUS; SUBCUTANEOUS
Status: DISCONTINUED | OUTPATIENT
Start: 2017-08-31 | End: 2017-09-08 | Stop reason: HOSPADM

## 2017-08-31 RX ORDER — SIROLIMUS 0.5 MG/1
0.5 TABLET, SUGAR COATED ORAL EVERY OTHER DAY
Status: DISCONTINUED | OUTPATIENT
Start: 2017-09-02 | End: 2017-08-31

## 2017-08-31 RX ORDER — NALOXONE HYDROCHLORIDE 0.4 MG/ML
.1-.4 INJECTION, SOLUTION INTRAMUSCULAR; INTRAVENOUS; SUBCUTANEOUS
Status: DISCONTINUED | OUTPATIENT
Start: 2017-08-31 | End: 2017-08-31

## 2017-08-31 RX ORDER — LEVOTHYROXINE SODIUM 150 UG/1
150 TABLET ORAL
Status: DISCONTINUED | OUTPATIENT
Start: 2017-09-01 | End: 2017-09-08 | Stop reason: HOSPADM

## 2017-08-31 RX ORDER — GLYCOPYRROLATE 0.2 MG/ML
INJECTION, SOLUTION INTRAMUSCULAR; INTRAVENOUS PRN
Status: DISCONTINUED | OUTPATIENT
Start: 2017-08-31 | End: 2017-08-31

## 2017-08-31 RX ADMIN — HYDROMORPHONE HYDROCHLORIDE 0.5 MG: 1 INJECTION, SOLUTION INTRAMUSCULAR; INTRAVENOUS; SUBCUTANEOUS at 14:42

## 2017-08-31 RX ADMIN — FENTANYL CITRATE 25 MCG: 50 INJECTION, SOLUTION INTRAMUSCULAR; INTRAVENOUS at 08:38

## 2017-08-31 RX ADMIN — LIDOCAINE HYDROCHLORIDE 40 MG: 20 INJECTION, SOLUTION INFILTRATION; PERINEURAL at 08:23

## 2017-08-31 RX ADMIN — URSODIOL 250 MG: 250 TABLET ORAL at 16:24

## 2017-08-31 RX ADMIN — PROPOFOL 20 MG: 10 INJECTION, EMULSION INTRAVENOUS at 08:37

## 2017-08-31 RX ADMIN — PROPOFOL 20 MG: 10 INJECTION, EMULSION INTRAVENOUS at 08:45

## 2017-08-31 RX ADMIN — EZETIMIBE 10 MG: 10 TABLET ORAL at 19:54

## 2017-08-31 RX ADMIN — MEROPENEM 1 G: 1 INJECTION, POWDER, FOR SOLUTION INTRAVENOUS at 19:55

## 2017-08-31 RX ADMIN — METOPROLOL TARTRATE 5 MG: 5 INJECTION INTRAVENOUS at 16:25

## 2017-08-31 RX ADMIN — FENTANYL CITRATE 25 MCG: 50 INJECTION, SOLUTION INTRAMUSCULAR; INTRAVENOUS at 08:31

## 2017-08-31 RX ADMIN — FENTANYL CITRATE 50 MCG: 50 INJECTION, SOLUTION INTRAMUSCULAR; INTRAVENOUS at 10:00

## 2017-08-31 RX ADMIN — SODIUM CHLORIDE, POTASSIUM CHLORIDE, SODIUM LACTATE AND CALCIUM CHLORIDE: 600; 310; 30; 20 INJECTION, SOLUTION INTRAVENOUS at 08:04

## 2017-08-31 RX ADMIN — METOPROLOL TARTRATE 5 MG: 5 INJECTION INTRAVENOUS at 22:23

## 2017-08-31 RX ADMIN — FENTANYL CITRATE 50 MCG: 50 INJECTION, SOLUTION INTRAMUSCULAR; INTRAVENOUS at 12:11

## 2017-08-31 RX ADMIN — SODIUM CHLORIDE 300 MG: 9 INJECTION, SOLUTION INTRAVENOUS at 18:45

## 2017-08-31 RX ADMIN — HYDROMORPHONE HYDROCHLORIDE 0.3 MG: 1 INJECTION, SOLUTION INTRAMUSCULAR; INTRAVENOUS; SUBCUTANEOUS at 13:26

## 2017-08-31 RX ADMIN — MIDAZOLAM HYDROCHLORIDE 1 MG: 1 INJECTION, SOLUTION INTRAMUSCULAR; INTRAVENOUS at 08:38

## 2017-08-31 RX ADMIN — FENTANYL CITRATE 25 MCG: 50 INJECTION, SOLUTION INTRAMUSCULAR; INTRAVENOUS at 08:23

## 2017-08-31 RX ADMIN — Medication 0.1 MG: at 08:18

## 2017-08-31 RX ADMIN — MIDAZOLAM HYDROCHLORIDE 1 MG: 1 INJECTION, SOLUTION INTRAMUSCULAR; INTRAVENOUS at 08:18

## 2017-08-31 RX ADMIN — MEROPENEM 1 G: 1 INJECTION, POWDER, FOR SOLUTION INTRAVENOUS at 14:17

## 2017-08-31 RX ADMIN — SODIUM CHLORIDE: 9 INJECTION, SOLUTION INTRAVENOUS at 14:17

## 2017-08-31 RX ADMIN — PROPOFOL 100 MCG/KG/MIN: 10 INJECTION, EMULSION INTRAVENOUS at 08:31

## 2017-08-31 ASSESSMENT — ACTIVITIES OF DAILY LIVING (ADL)
AMBULATION: 0-->INDEPENDENT
SWALLOWING: 0-->SWALLOWS FOODS/LIQUIDS WITHOUT DIFFICULTY
RETIRED_COMMUNICATION: 0-->UNDERSTANDS/COMMUNICATES WITHOUT DIFFICULTY
COGNITION: 0 - NO COGNITION ISSUES REPORTED
BATHING: 0-->INDEPENDENT
TOILETING: 0-->INDEPENDENT
FALL_HISTORY_WITHIN_LAST_SIX_MONTHS: NO
TRANSFERRING: 0-->INDEPENDENT
DRESS: 0-->INDEPENDENT
RETIRED_EATING: 0-->INDEPENDENT

## 2017-08-31 ASSESSMENT — LIFESTYLE VARIABLES: TOBACCO_USE: 1

## 2017-08-31 ASSESSMENT — PAIN DESCRIPTION - DESCRIPTORS
DESCRIPTORS: DISCOMFORT
DESCRIPTORS: ACHING;CONSTANT;DISCOMFORT
DESCRIPTORS: ACHING;CONSTANT;DISCOMFORT

## 2017-08-31 NOTE — PROGRESS NOTES
Patient contact note  16:50    Luz Thompson requested that Dr. Ramirez be consulted regarding orders for her immunosuppressants. It was relayed to the patient that her immunosuppression regimen had been discussed with Dr. Ramirez, who decided that she could continue with the prednisone as ordered and that we would withhold Rapamune and cyclosporine at this time. MsManny Jay was agreeable with this plan.    Audrey Sparrow, PGY-1  General Surgery

## 2017-08-31 NOTE — ANESTHESIA POSTPROCEDURE EVALUATION
Patient: Luz Thompson    Procedure(s):  Colonoscopy, Flexible Bronchoscopy, Endobronchial Ultrasound With Transbronchial Biopsies   - Wound Class: II-Clean Contaminated   - Wound Class: II-Clean Contaminated    Diagnosis:Screening  Diagnosis Additional Information: No value filed.    Anesthesia Type:  MAC    Note:  Anesthesia Post Evaluation    Patient location during evaluation: PACU  Patient participation: Able to fully participate in evaluation  Level of consciousness: awake  Pain management: adequate  Airway patency: patent  Respiratory status: acceptable  Hydration status: acceptable  PONV: none     Anesthetic complications: None          Last vitals:  Vitals:    08/31/17 0917 08/31/17 0918 08/31/17 0919   BP:      Resp: 17 12 14   SpO2: 100% 100% 100%         Electronically Signed By: Kev Mcadams MD  August 31, 2017  10:21 AM

## 2017-08-31 NOTE — IP AVS SNAPSHOT
MRN:[id number]                      After Visit Summary   8/31/2017    Luz Thompson    MRN: [id number]           Thank you!     Thank you for choosing [facility] for your care. Our goal is always to provide you with excellent care. Hearing back from our patients is one way we can continue to improve our services. Please take a few minutes to complete the written survey that you may receive in the mail after you visit with us. Thank you!        Patient Information     Date Of Birth          1950        Designated Caregiver       Most Recent Value    Caregiver    Will someone help with your care after discharge? yes    Name of designated caregiver Robbin    Phone number of caregiver [phone]    Caregiver address [address] [address] [facility] [address]      About your hospital stay     You were admitted on:  August 31, 2017 You last received care in the:  [address] [facility]    You were discharged on:  September 8, 2017        Reason for your hospital stay       You were hospitalized following a 6mm perforation of the rectal wall s/p colonoscopy.                  Who to Call     For medical emergencies, please call 911.  For non-urgent questions about your medical care, please call your primary care provider or clinic, [phone]  For questions related to your surgery, please call your surgery clinic        Attending Provider     Provider Specialty    Eneida Montano MD Internal Medicine    Yuridia Frost MD Colon and Rectal Surgery       Primary Care Provider Office Phone # Fax #    Jennifer Amparo Barraza [phone]      After Care Instructions     Activity       Your activity upon discharge: activity as tolerated            Diet       Follow this diet upon discharge: Orders Placed This Encounter      Snacks/Supplements Adult: Ensure Plus (Adult); With Meals      Regular Diet Adult                  Follow-up Appointments     Adult [facility]  Follow-up and recommended labs and tests       DIET  -Resume Regular Diet for at least 4-6 weeks unless cleared by Colorectal surgery.    -Stay well hydrated    ACTIVITY  -No driving while on narcotic analgesics (i.e. Percocet, oxycodone, Vicodin)    NOTIFY  Please contact Kandis Aviles LPN or Rhonda Barlow RN at [phone] for problems after discharge such as:  -Temperature > 101F, chills, rigors, dizziness  -Redness around or purulent drainage from wound  -Inability to tolerate diet, nausea or vomiting  -You stop passing gas, develop significant bloating, abdominal pain  -Have blood in stools/vomit  -Have severe diarrhea/constipation  -Any other questions or concerns.  - At nights (after 4:30pm), on weekends, or if urgent, call [phone] and ask the  to speak with the on-call Colorectal Surgery resident or fellow      Medication Instructions  Some of your medications may have changed. Please take only prescribed and resumed medications     FOLLOW-UP  1.  Please follow up with CRS Staff: Dr. Frost as needed for rectal pain/bleeding.  Please contact our clinic scheduler Leah Tena (phone # [phone]) or Tammy Du (phone # [phone])  to schedule a follow-up appointment.    2.  Follow up with your cardiologist in 2-3 weeks after discharge from the hospital to review this hospitalization and to continue discussing Eliquis versus aspirin.  From a colorectal surgery perspective, can restart either aspirin or Eliquis one week from your last bowel movement associated with blood.  Otherwise will defer ultimate conversations regarding starting Aspirin versus Eliquis-and timing of bronchoscopy to your primary cardiologist.    3. Follow up with your liver transplant coordinator for cyclosporin dosing and next cyclosporin level check.    4.  Follow up with Infectious Disease after your bronchoscopy.       Appointments on [facility] and/or [facility] (with [facility] or [facility] provider or service).  Call [phone] if you haven't heard regarding these appointments within 7 days of discharge.                  Your next 10 appointments already scheduled     Sep 14, 2017  8:00 PM CDT   PSG Split with SLEEP STUDY RM 3   [facility]    [address] [place] [id number] [phone]            Sep 19, 2017 11:15 AM CDT   (Arrive by 11:00 AM)   Return General Liver with Gentry Ramirez MD   [facility] Hepatology ([facility] [place])    [address] [facility]  3rd Floor  [facility] [address] [phone]            Oct 02, 2017  2:00 PM CDT   Return Sleep Patient with Vero Quiñonez MD   [facility])    [address] [place] [id number] [phone]              Further instructions from your care team       [facility] Colonoscopy/Flexible Sigmoidoscopy with Monitored Anesthesia Care  For 24 hours after your procedure  Sedation:  1. Get plenty of rest. A responsible adult must stay with you for at least 24 hours after you leave the hospital.   2. Do not drive or use heavy equipment. If you have weakness or tingling, don't drive or use heavy equipment until this feeling goes away.  3. Do not drink alcohol.  4. Avoid strenuous or risky activities. Ask for help when climbing stairs.   5. You may feel lightheaded. IF so, sit for a few minutes before standing. Have someone help you get up.   6. If you have nausea (feel sick to your stomach): Drink only clear liquids such as apple juice, ginger ale, broth or 7-Up. Rest may also help. Be sure to drink enough fluids. Move to a regular diet as you feel able.  7. You may have a slight fever. Call the doctor if your fever is over 100 F (37.7 C) (taken under the tongue) or lasts longer than 24 hours.  8. You may have a dry mouth, a sore throat, muscle aches or trouble sleeping.  These should go away after 24 hours.  9. Do not make important or legal decisions.   Procedural:  1. You may return to your normal diet and medicines.  2. Air was placed in your colon during the exam in order to see it. Walking helps to pass the air.  3. You may take Tylenol (acetaminophen) for pain unless your doctor has told you not to.   Do not take aspirin or ibuprofen (Advil, Motrin, or other anti-inflammatory  drugs) for _____ days.  Call your doctor for any of the following  1. Unusual pain in belly or chest pain not relieved with passing air.  2. More than 1 to 2 Tablespoons of bleeding from your rectum.  3. Signs of infection (fever, growing tenderness at the surgery site, a large amount of drainage or bleeding, severe pain, foul-smelling drainage, redness, swelling).  4. It has been over 8 to 10 hours since surgery and you are still not able to urinate (pass water).  5. Headache for over 24 hours.  6. Numbness, tingling or weakness the day after surgery (if you had spinal anesthesia).      If you have severe pain, bleeding, or shortness of breath, go to an emergency room.  If you have questions, call:  Endoscopy: Monday to Friday, 7 a.m. to 4:30 p.m. [phone] (We may have to call you back)  After hours: Hospital  [phone] (Ask for the GI fellow on call)        Pending Results     Date and Time Order Name Status Description    9/8/2017 0130 Sirolimus level In process     9/8/2017 0130 Cyclosporine In process             Statement of Approval     Ordered          09/08/17 0927  I have reviewed and agree with all the recommendations and orders detailed in this document.  EFFECTIVE NOW     Approved and electronically signed by:  Mariola Orozco PA-C             Admission Information     Date & Time Provider Department Dept. Phone    8/31/2017 Yuridia Frost MD [address] [facility] [place] [phone]      Your Vitals Were     Blood Pressure Pulse Temperature Respirations Height  "Weight    145/63 (BP Location: Left arm) 68 96.8  F (36  C) (Oral) 16 1.715 m (5' 7.5\") 87 kg (191 lb 12.8 oz)    Pulse Oximetry BMI (Body Mass Index)                96% 29.6 kg/m2          [employer] Information     [employer] gives you secure access to your electronic health record. If you see a primary care provider, you can also send messages to your care team and make appointments. If you have questions, please call your primary care clinic.  If you do not have a primary care provider, please call [phone] and they will assist you.        Care EveryWhere ID     This is your Care EveryWhere ID. This could be used by other organizations to access your [facility] medical records  [id number]        Equal Access to Services     GENE NOWAK : Valerie Ma, mercy denny, dian brizuela, etta hardy. [facility] [phone].    ATENCIÓN: Si habla español, tiene a jason disposición servicios gratuitos de asistencia lingüística. Llame al [phone].    We comply with applicable federal civil rights laws and [place] laws. We do not discriminate on the basis of race, color, national origin, age, disability sex, sexual orientation or gender identity.               Review of your medicines      START taking        Dose / Directions    cycloSPORINE modified 25 MG capsule   Commonly known as:  GENERIC EQUIVALENT        Dose:  75 mg   Take 3 capsules (75 mg) by mouth 2 times daily   Quantity:  180 capsule   Refills:  0         CONTINUE these medicines which may have CHANGED, or have new prescriptions. If we are uncertain of the size of tablets/capsules you have at home, strength may be listed as something that might have changed.        Dose / Directions    ezetimibe 10 MG tablet   Commonly known as:  ZETIA   This may have changed:    - how much to take  - when to take this  - additional instructions        Dose:  5 mg   Take 0.5 tablets (5 mg) by mouth every evening "   Quantity:  30 tablet   Refills:  0       PRESERVISION AREDS 2 Caps   This may have changed:    - how much to take  - when to take this   Used for:  Dry eyes, bilateral        Dose:  2 capsule   Take 2 capsules by mouth daily   Refills:  0       triamcinolone 0.1 % cream   Commonly known as:  KENALOG   This may have changed:    - when to take this  - reasons to take this  - additional instructions   Used for:  Contact dermatitis and other eczema, due to unspecified cause        Apply topically 2 times daily Apply to rash   Quantity:  80 g   Refills:  3         CONTINUE these medicines which have NOT CHANGED        Dose / Directions    acetaminophen 500 MG Caps        Dose:  500 mg   Take 500 mg by mouth as needed Take 500-1,000 mg by mouth every 6 hours if needed.   Refills:  0       ARTIFICIAL TEARS OP        Dose:  1 drop   Apply 1 drop to eye as needed   Refills:  0       BENEFIBER Powd        2 teaspoons daily   Refills:  0       calcitRIOL 0.5 MCG capsule   Commonly known as:  ROCALTROL        Dose:  0.5 mcg   Take 1 capsule (0.5 mcg) by mouth daily   Quantity:  90 capsule   Refills:  3       clotrimazole 1 % cream   Commonly known as:  LOTRIMIN        Apply topically 2 times daily as needed Reported on 4/25/2017   Refills:  0       folic acid 1 MG tablet   Commonly known as:  FOLVITE        Dose:  1 mg   Take 1 tablet (1 mg) by mouth daily   Quantity:  100 tablet   Refills:  3       furosemide 20 MG tablet   Commonly known as:  LASIX   Used for:  CKD (chronic kidney disease) stage 3, GFR 30-59 ml/min        Dose:  10 mg   Take 0.5 tablets (10 mg) by mouth daily   Quantity:  46 tablet   Refills:  3       hydrALAZINE 25 MG tablet   Commonly known as:  APRESOLINE   Used for:  HTN (hypertension)        Dose:  12.5 mg   Take 0.5 tablets (12.5 mg) by mouth 3 times daily as needed , if systolic blood pressure is more than 140 mmHg.   Quantity:  270 tablet   Refills:  3       levothyroxine 150 MCG tablet   Commonly  known as:  SYNTHROID/LEVOTHROID   Used for:  Postablative hypothyroidism        Take one tablet daily 6 days a week and one and a half tablets one day a week.   Quantity:  96 tablet   Refills:  3       magnesium 250 MG tablet        Dose:  2 tablet   Take 2 tablets by mouth daily At night.   Refills:  0       meclizine 25 MG tablet   Commonly known as:  ANTIVERT        Dose:  25 mg   Take 1 tablet (25 mg) by mouth as needed   Quantity:  30 tablet   Refills:  11       metoprolol 50 MG 24 hr tablet   Commonly known as:  TOPROL-XL   Used for:  Paroxysmal atrial fibrillation (H)        Dose:  50 mg   Take 1 tablet (50 mg) by mouth daily   Quantity:  90 tablet   Refills:  3       predniSONE 5 MG tablet   Commonly known as:  DELTASONE   Used for:  Thyroid cancer (H)        Dose:  5 mg   Take 1 tablet (5 mg) by mouth daily   Quantity:  90 tablet   Refills:  3       PROAIR  (90 BASE) MCG/ACT Inhaler   Generic drug:  albuterol        Dose:  2 puff   Inhale 2 puffs into the lungs every 4 hours as needed for shortness of breath / dyspnea or wheezing Reported on 4/25/2017   Refills:  0       SUMAtriptan 50 MG tablet   Commonly known as:  IMITREX   Used for:  Thyroid cancer (H)        Dose:  50 mg   Take 1 tablet (50 mg) by mouth at onset of headache for migraine repeat after 2 hours if needed.   Quantity:  30 tablet   Refills:  3       ursodiol 250 MG tablet   Commonly known as:  ACTIGALL        Dose:  250 mg   Take 1 tablet (250 mg) by mouth 2 times daily   Quantity:  180 tablet   Refills:  3       voriconazole 200 MG tablet   Commonly known as:  VFEND   Used for:  Coccidioidal pneumonitis (H)        Dose:  200 mg   Take 1 tablet (200 mg) by mouth 2 times daily   Quantity:  60 tablet   Refills:  11       zolpidem 5 MG tablet   Commonly known as:  AMBIEN        Take tablet by mouth 15 minutes prior to sleep, for Sleep Study   Quantity:  1 tablet   Refills:  0         STOP taking     apixaban ANTICOAGULANT 2.5 MG tablet    Commonly known as:  ELIQUIS           sirolimus Tabs tablet           STATIN NOT PRESCRIBED (INTENTIONAL)           STATIN NOT PRESCRIBED (INTENTIONAL)                Where to get your medicines      These medications were sent to [facility] - [place], [place] - [address]  [address] [address]     Phone:  [phone]     cycloSPORINE modified 25 MG capsule                Protect others around you: Learn how to safely use, store and throw away your medicines at www.disposemymeds.org.             Medication List: This is a list of all your medications and when to take them. Check marks below indicate your daily home schedule. Keep this list as a reference.      Medications           Morning Afternoon Evening Bedtime As Needed    acetaminophen 500 MG Caps   Take 500 mg by mouth as needed Take 500-1,000 mg by mouth every 6 hours if needed.                                ARTIFICIAL TEARS OP   Apply 1 drop to eye as needed                                BENEFIBER Powd   2 teaspoons daily                                calcitRIOL 0.5 MCG capsule   Commonly known as:  ROCALTROL   Take 1 capsule (0.5 mcg) by mouth daily                                clotrimazole 1 % cream   Commonly known as:  LOTRIMIN   Apply topically 2 times daily as needed Reported on 4/25/2017                                cycloSPORINE modified 25 MG capsule   Commonly known as:  GENERIC EQUIVALENT   Take 3 capsules (75 mg) by mouth 2 times daily   Last time this was given:  75 mg on 9/8/2017  8:30 AM                                ezetimibe 10 MG tablet   Commonly known as:  ZETIA   Take 0.5 tablets (5 mg) by mouth every evening   Last time this was given:  5 mg on 9/7/2017  8:04 PM                                folic acid 1 MG tablet   Commonly known as:  FOLVITE   Take 1 tablet (1 mg) by mouth daily                                furosemide 20 MG tablet   Commonly known as:  LASIX   Take 0.5  tablets (10 mg) by mouth daily                                hydrALAZINE 25 MG tablet   Commonly known as:  APRESOLINE   Take 0.5 tablets (12.5 mg) by mouth 3 times daily as needed , if systolic blood pressure is more than 140 mmHg.   Last time this was given:  12.5 mg on 9/8/2017  8:31 AM                                levothyroxine 150 MCG tablet   Commonly known as:  SYNTHROID/LEVOTHROID   Take one tablet daily 6 days a week and one and a half tablets one day a week.   Last time this was given:  150 mcg on 9/8/2017  8:31 AM                                magnesium 250 MG tablet   Take 2 tablets by mouth daily At night.                                meclizine 25 MG tablet   Commonly known as:  ANTIVERT   Take 1 tablet (25 mg) by mouth as needed                                metoprolol 50 MG 24 hr tablet   Commonly known as:  TOPROL-XL   Take 1 tablet (50 mg) by mouth daily   Last time this was given:  50 mg on 9/8/2017  8:31 AM                                predniSONE 5 MG tablet   Commonly known as:  DELTASONE   Take 1 tablet (5 mg) by mouth daily   Last time this was given:  5 mg on 9/8/2017  8:30 AM                                PRESERVISION AREDS 2 Caps   Take 2 capsules by mouth daily                                PROAIR  (90 BASE) MCG/ACT Inhaler   Inhale 2 puffs into the lungs every 4 hours as needed for shortness of breath / dyspnea or wheezing Reported on 4/25/2017   Generic drug:  albuterol                                SUMAtriptan 50 MG tablet   Commonly known as:  IMITREX   Take 1 tablet (50 mg) by mouth at onset of headache for migraine repeat after 2 hours if needed.   Last time this was given:  50 mg on 9/1/2017 10:33 AM                                triamcinolone 0.1 % cream   Commonly known as:  KENALOG   Apply topically 2 times daily Apply to rash                                ursodiol 250 MG tablet   Commonly known as:  ACTIGALL   Take 1 tablet (250 mg) by mouth 2 times daily    Last time this was given:  250 mg on 9/8/2017  8:31 AM                                voriconazole 200 MG tablet   Commonly known as:  VFEND   Take 1 tablet (200 mg) by mouth 2 times daily   Last time this was given:  200 mg on 9/8/2017  8:31 AM                                zolpidem 5 MG tablet   Commonly known as:  AMBIEN   Take tablet by mouth 15 minutes prior to sleep, for Sleep Study

## 2017-08-31 NOTE — ANESTHESIA CARE TRANSFER NOTE
Patient: Luz Thompson    Procedure(s):  Colonoscopy, Flexible Bronchoscopy, Endobronchial Ultrasound With Transbronchial Biopsies   - Wound Class: II-Clean Contaminated   - Wound Class: II-Clean Contaminated    Diagnosis: Screening  Diagnosis Additional Information: No value filed.    Anesthesia Type:   No value filed.     Note:  Airway :Face Mask  Patient transferred to:Phase II  Comments: Patient transferred to PACU spontaneously breathing.  VSS.  Report to RN.       Vitals: (Last set prior to Anesthesia Care Transfer)    CRNA VITALS  8/31/2017 0834 - 8/31/2017 0904      8/31/2017             Pulse: 65    Ht Rate: 66    SpO2: 100 %    Resp Rate (set): 10                Electronically Signed By: LIZ Dubose CRNA  August 31, 2017  9:04 AM

## 2017-08-31 NOTE — TELEPHONE ENCOUNTER
Pt called concerned about being told to stop rapa and prednisone due to perf. Spoke with Julissa from [facility] who patient said she would either take Rapa or leave AMA. Spoke with Dr hernandez who states patient should stop rapamune, continue prednisone and start tacro or CSA.

## 2017-08-31 NOTE — CONSULTS
[facility] Surgery Consultation    Luz Thompson MRN# [id number]   Age: 67 year old YOB: 1950     Date of Admission:  8/31/2017    Date of Consult:   8/31/17    Reason for consult: Rectal perforation after colonoscopy       Requesting service: Dr. Montano, GI.                    Assessment and Plan:   Assessment:   67 yr old lady with multiple comorbidities including immunosuppression for liver transplant,   Developed a rectal perforation after colonoscopy this morning.  The perforation was identified during scoping and has been clipped.   CT scan performed shows small collection next to perforation as well as some air tracking but still beneath the peritoneum.         Plan:   1- keep npo with iv fluids for resuscitation  2- ID consulted for antibiotic coverage- will start with meropenem for now  3- monitor labs daily  4- low threshold for taking patient to OR if she clinically gets worse      Discussed with staff, Dr. Frost            Chief Complaint:   Rectal perforation after colonoscopy.          History of Present Illness:     Luz Thompson is a 66 year old female with with h/o recurrent E. Coli bacteremia, recurrent UTI, primary biliary cirrhosis s/p liver transplantation (2002), secondary Sjogren's syndrome, h/o papillary thyroid cancer s/p thyroidectomy, hypothyroidism, HTN, HLD, CVA, CKD 3, chronic anemia, VRE and EBV viremia,   Had a screening colonoscopy this morning, complicated by rectal perforation during retroflexion,   This was identified instantly and clipped. Defect was about 6mm as per attending GI.    We have been consulted to manage.    Patient denies any abdominal or rectal pain.   She denies nausea or vomiting episodes.   She is hemodynamically stable    She is on prednisone 5mg daily and rapamune.    CT scan was performed and showed:  There is a small  hyperdense collection along the right rectal wall, measuring 1.8 x 2.1x2.6  Cm with mall foci of  "adjacent free air extending along, but not beyond the mesorectal fascia.                   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 Gentry Ramirez, [facility] GI       Macular degeneration      Migraines 4/4/2012     Osteoarthritis of right knee 8/2/2012     Osteoporosis 4/20/2012     Paroxysmal a-fib (H) 6/13/2017     Post-surgical hypothyroidism      Postablative hypothyroidism 8/13/2012     Primary biliary cirrhosis (H)     s/p Liver transplant, [id number]     Sjogren's syndrome (H)      Statin intolerance      Unspecified cerebral artery occlusion with cerebral infarction 2001    when BP was very low, small multiple infacts in frontal lobe, had \"visual field cut,\" leg weakness, and expressive aphasia - all have resolved.      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             Past Surgical History:     Past Surgical History:   Procedure Laterality Date     APPENDECTOMY  1961     BIOPSY       CATARACT IOL, RT/LT      RE12/19/2013, LE12/10/2013 - Toric lenses     CHOLECYSTECTOMY  1991     COLONOSCOPY       COLONOSCOPY  3/10/2014    Procedure: COLONOSCOPY;;  Surgeon: Gentry Ramirez MD;  Location: [facility]" "GI     ENDOSCOPIC RETROGRADE CHOLANGIOPANCREATOGRAM  2013    Procedure: ENDOSCOPIC RETROGRADE CHOLANGIOPANCREATOGRAM;  Endoscopic Retrograde Cholangiopancreatogram with single balloon enteroscopy, ballon sweep of bile duct;  Surgeon: Brett Membreno MD;  Location: [facility] OR     ENT SURGERY      ear drum repair     HC KNEE SCOPE,MED/LAT MENISECTOMY  8/10/12    Right, partial medial menisectomy only     KNEE SURGERY  1966    R knee     PICC INSERTION  2013    4fr SL PASV PICC, 40cm (1cm external) in the R basilic vein w/ tip in the low SVC     PICC INSERTION  2014    5 fr DL BioFlo Navilyst PICC, 46 cm (3 cm external) in the L basilic vein w/ tip in the SVC RA junction.     THYROIDECTOMY  3/2010     TRANSPLANT LIVER RECIPIENT LIVING UNRELATED               Social History:     Social History   Substance Use Topics     Smoking status: Former Smoker     Packs/day: 1.00     Years: 18.00     Types: Cigarettes     Quit date: 1985     Smokeless tobacco: Never Used     Alcohol use 0.0 oz/week     0 Standard drinks or equivalent per week      Comment: rare - \"I toast at weddings\"             Family History:     Family History   Problem Relation Age of Onset     Hypertension Mother      Endometrial Cancer Mother      Hyperlipidemia Mother      Prostate Cancer Father      Macular Degeneration Father      Cancer - colorectal Maternal Grandmother      in her 80's, has surgery and removal of part of kidney,  at age 98     HEART DISEASE Maternal Grandfather       at 98     Glaucoma Maternal Grandfather      CEREBROVASCULAR DISEASE Paternal Grandmother      in her 80's     Hypertension Paternal Grandmother      HEART DISEASE Paternal Grandfather      MI     Alzheimer Disease Paternal Grandfather      Allergies Son      Neurologic Disorder Daughter      Migraines     Breast Cancer Other      Anesthesia Reaction No family hx of      Crohn Disease No family hx of      Ulcerative Colitis No family hx of  "                Allergies:     Allergies   Allergen Reactions     Fluconazole Hives and Itching     Azithromycin Itching     Benadryl [Diphenhydramine Hcl]      Insomnia      Cefpodoxime Itching     Cellcept Diarrhea     Ciprofloxacin Other (See Comments)     Insomnia, mood lability     Codeine      Psych disturbance     Diphenhydramine Other (See Comments)     Doxycycline      Lansoprazole Diarrhea     Levaquin [Levofloxacin] Other (See Comments)     Headache, hyperactivity     Lisinopril Cough     Methotrexate      Sores     Morphine Itching     Morphine Sulfate Itching     Mycophenolate Diarrhea     Pepcid Diarrhea     Pravastatin Muscle Pain (Myalgia)     Prevacid Diarrhea     Ranitidine Diarrhea     Simvastatin Muscle Pain (Myalgia)     severe     Zantac Diarrhea     Cephalexin Rash     Fever and skin burning     Penicillin G Itching and Rash     Penicillins Rash     Tolectin [Nsaids] Rash     Tramadol Rash             Medications:     Current Facility-Administered Medications   Medication     meropenem (MERREM) 1 g vial to attach to  mL bag     fentaNYL (PF) (SUBLIMAZE) injection 50 mcg               Review of Systems:   C: NEGATIVE for fever, chills, change in weight  CONSTITUTIONAL:NEGATIVE for fever, chills, change in weight  E/M: NEGATIVE for ear, mouth and throat problems  R: NEGATIVE for significant cough or SOB  CV: NEGATIVE for chest pain, palpitations or peripheral edema  CV: NEGATIVE for chest pain, palpitations or peripheral edema  GI: NEGATIVE for nausea, abdominal pain, heartburn, or change in bowel habits          Physical Exam:   All vitals have been reviewed  Heart Rate:  [61-71] 61  Resp:  [0-20] 0  BP: (135-175)/(64-90) 155/64  SpO2:  [96 %-100 %] 96 %    Intake/Output Summary (Last 24 hours) at 08/31/17 1247  Last data filed at 08/31/17 0904   Gross per 24 hour   Intake              350 ml   Output                0 ml   Net              350 ml     Physical Exam:  Neck:   Supple,  symmetrical, trachea midline, no adenopathy, thyroid symmetric, not enlarged and no tenderness, skin normal     Chest / Breast:   Bilateral air entry     Abdomen:   No scars, normal bowel sounds, soft, non-distended, non-tender, no masses palpated, no hepatosplenomegally     Musculoskeletal:   There is no redness, warmth, or swelling of the joints.  Full range of motion noted.  Motor strength is 5 out of 5 all extremities bilaterally.  Tone is normal.     Additional findings:   RODRÍGUEZ: no rectal pain or blood noted             Torsten Neely MD      Staff Addendum:  Agree with the consultation H&P as documented by the housestaff. I was personally involved with the recommendations made by our service for this patient. I saw the patinet and examined her. The patient is hemodynamically stable, no peritonitis. No tenderness.  Plan serial exams, antibiotics, NPO and will reevauabte.  Discussed at length with the patient.  Yuridia Frost MD  Colon and Rectal Surgery Staff  [facility]

## 2017-08-31 NOTE — PLAN OF CARE
"Problem: Goal Outcome Summary  Goal: Goal Outcome Summary  Outcome: No Change  /71  Resp 16  Ht 1.715 m (5' 7.5\")  Wt 84.8 kg (187 lb)  SpO2 95%  BMI 28.86 kg/m2     Pt admitted from endoscopy around 1330 for a perforated bowel from a colonoscopy. A & O X4, pt verbalized frustration. Pain managed with PRN dilaudid. Pt c/o of lower abdominal and rectal pain. Denies nausea. Having some rectal bleeding, very small amounts. PIV saline locked, waiting for IV pole to start merrem. Voiding spont. Last BM yesterday during bowel prep. Up ad ashley. NPO for now. Contact isolation for VRE.     Addendum: pt states she feels a lot of pressure in her bladder. She had frequency but no other symptoms of a UTI. UA ordered  "

## 2017-08-31 NOTE — DISCHARGE INSTRUCTIONS
[facility] Colonoscopy/Flexible Sigmoidoscopy with Monitored Anesthesia Care  For 24 hours after your procedure  Sedation:  1. Get plenty of rest. A responsible adult must stay with you for at least 24 hours after you leave the hospital.   2. Do not drive or use heavy equipment. If you have weakness or tingling, don't drive or use heavy equipment until this feeling goes away.  3. Do not drink alcohol.  4. Avoid strenuous or risky activities. Ask for help when climbing stairs.   5. You may feel lightheaded. IF so, sit for a few minutes before standing. Have someone help you get up.   6. If you have nausea (feel sick to your stomach): Drink only clear liquids such as apple juice, ginger ale, broth or 7-Up. Rest may also help. Be sure to drink enough fluids. Move to a regular diet as you feel able.  7. You may have a slight fever. Call the doctor if your fever is over 100 F (37.7 C) (taken under the tongue) or lasts longer than 24 hours.  8. You may have a dry mouth, a sore throat, muscle aches or trouble sleeping. These should go away after 24 hours.  9. Do not make important or legal decisions.   Procedural:  1. You may return to your normal diet and medicines.  2. Air was placed in your colon during the exam in order to see it. Walking helps to pass the air.  3. You may take Tylenol (acetaminophen) for pain unless your doctor has told you not to.   Do not take aspirin or ibuprofen (Advil, Motrin, or other anti-inflammatory  drugs) for _____ days.  Call your doctor for any of the following  1. Unusual pain in belly or chest pain not relieved with passing air.  2. More than 1 to 2 Tablespoons of bleeding from your rectum.  3. Signs of infection (fever, growing tenderness at the surgery site, a large amount of drainage or bleeding, severe pain, foul-smelling drainage, redness, swelling).  4. It has been over 8 to 10 hours since surgery and you are still not able to urinate (pass water).  5. Headache for over 24  hours.  6. Numbness, tingling or weakness the day after surgery (if you had spinal anesthesia).      If you have severe pain, bleeding, or shortness of breath, go to an emergency room.  If you have questions, call:  Endoscopy: Monday to Friday, 7 a.m. to 4:30 p.m. [phone] (We may have to call you back)  After hours: Hospital  [phone] (Ask for the GI fellow on call)

## 2017-08-31 NOTE — ANESTHESIA PREPROCEDURE EVALUATION
Anesthesia Evaluation     . Pt has had prior anesthetic. Type: General    No history of anesthetic complications          ROS/MED HX    ENT/Pulmonary:     (+)tobacco use, Past use 1 packs/day  , . .    Neurologic:     (+)CVA date: 2001 without deficits   (-) TIA   Cardiovascular:     (+) hypertension----. : . . . :. valvular problems/murmurs .       METS/Exercise Tolerance:     Hematologic:         Musculoskeletal:         GI/Hepatic:     (+) liver disease (S/P liver transplant for primary biliary cirrhosis),       Renal/Genitourinary:     (+) chronic renal disease, type: CRI, Pt does not require dialysis, Pt has no history of transplant,       Endo:     (+) thyroid problem hypothyroidism, .      Psychiatric:         Infectious Disease:         Malignancy:         Other:                                    Anesthesia Plan      History & Physical Review  History and physical reviewed and following examination; no interval change.    ASA Status:  3 emergent.    NPO Status:  > 6 hours and > 8 hours    Plan for General and ETT with Intravenous induction. Maintenance will be Balanced.    PONV prophylaxis:  Ondansetron (or other 5HT-3) and Dexamethasone or Solumedrol  Additional equipment: 2nd IV    Procedures:   LAPAROSCOPY DIAGNOSTIC (GENERAL) (N/A Abdomen) Scope, Lithotomy, Eua, Ex-lap, Possible colostomy   EXAM UNDER ANESTHESIA ANUS (N/A Abdomen)   SIGMOIDOSCOPY FLEXIBLE (N/A Anus)      Anesthesia type: General    Pre-op diagnosis: Perferated Bowel    [facility]: [facility]    Surgeon: Yuridia Frost MD    The patient had colonoscopy under MAC for routine 5 year screening today c/b bowel perforation. 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 The patient has had an echocardiogram which was normal in 2015.  PFTs 4/2017 were normal.    Patient to OR following colonoscopy complicated by bowel perforation today.    Peripheral IV 08/31/17; 0842; 22 G, Right, Posterior; Upper forearm - will require larger IVg    Patient takes Prednisone 5 mg PO QD.  Do not anticipate that stress-dose steroids will be required.   PATIENT WILL REQUIRE STRESS STEROIDS - 100 MG HYDROCORTISONE    Plan: GAET. Stress Steroids.      Postoperative Care  Postoperative pain management:  IV analgesics.      Consents             PAC Discussion and Assessment    ASA Classification: 3  Case is suitable for: [employer]  Anesthetic techniques and relevant risks discussed: MAC with GA as backup  Invasive monitoring and risk discussed: No  Types:   Possibility and Risk of blood transfusion discussed: No  NPO instructions given:   Additional anesthetic preparation and risks discussed:   Needs early admission to pre-op area:   Other:     PAC Resident/NP Anesthesia Assessment:  Luz Thompson is a 67 year old female scheduled to undergo Colonoscopy on 8/31/17 with Dr. Eneida Montano.    She has the following specific operative considerations:   1.  Increased risk for obstructive sleep apnea: Recommend careful positioning to prevent airway compromise and close monitoring of the patient's respiratory status.    2.  History of CVA: Recommend that prolonged episodes of hypo- or hypertension be avoided during the perioperative period.  3.  HTN: Recommend that the patient be instructed to continue Toprol as prescribed.    4.  Paroxysmal atrial fibrillation: Recommend that the patient be instructed to continue Toprol for rate control.  Patient should hold Eliquis prior to the procedure.    5.  Chronic steroid use: Patient takes Prednisone 5 mg PO QD.  Do not anticipate that stress-dose steroids will be required.  6.  CKD:  Recommend close monitoring of fluid balance and electrolytes throughout the perioperative period.    7.  Anemia: Recommend close monitoring for postoperative bleeding.    Revised Cardiac Risk Index: 0.9% risk of major adverse cardiac event.  VTE risk: 1.8%  RASHIDA risk: Intermediate  PONV risk score= 2.  (If > 2, anti-emetic intervention is recommended.)  Anesthesia considerations: Refer to PAC assessment in the anesthesia records.      Reviewed and Signed by PAC Mid-Level Provider/Resident  Mid-Level Provider/Resident: Alpa Osman CNP  Date: 7/31/17  Time: 1855    Attending Anesthesiologist Anesthesia Assessment:  I have examined the patient and reviewed the medical record.  I have discussed the patient with the DOMI and concur with her assessment.  The patient os scheduled for colonoscopy under MAC for routine 5 year screening.  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  PFTs 4/2017 were normal.    PE:  Healthy appearing female in no distress.  MPC 2, 4 FBTMD.  Lungs clear.  CVS  RRR with no murmur    No further testing is necessary.  Final plan per anesthesiologist the day of surgery        Reviewed and Signed by PAC Anesthesiologist  Anesthesiologist: Jefferson Purcell MD  Date: 07/31/2017    ANESTHESIA PREOP EVALUATION    HPI: Luz Thompson is a 67 year old female who presents for Procedure(s):  Scope, Lithotomy, Eua, Ex-lap, Possible colostomy - Wound Class: I-Clean     - Wound Class: II-Clean Contaminated      No personal or family h/o anesthesia problems    PMHx/PSHx/ROS:  Past Medical History:   Diagnosis Date     Anemia of  "other chronic disease 10/17/2011     Anxiety      Bladder infection, chronic 4/4/2012     CKD (chronic kidney disease) stage 3, GFR 30-59 ml/min 4/4/2012     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 Gentry Ramirez, [facility]       Macular degeneration      Migraines 4/4/2012     Osteoarthritis of right knee 8/2/2012     Osteoporosis 4/20/2012     Paroxysmal a-fib (H) 6/13/2017     Post-surgical hypothyroidism      Postablative hypothyroidism 8/13/2012     Primary biliary cirrhosis (H)     s/p Liver transplant, [id number]     Sjogren's syndrome (H)      Statin intolerance      Unspecified cerebral artery occlusion with cerebral infarction 2001    when BP was very low, small multiple infacts in frontal lobe, had \"visual field cut,\" leg weakness, and expressive aphasia - all have resolved.      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       Past Surgical History:   Procedure Laterality Date     APPENDECTOMY  1961     BIOPSY       CATARACT IOL, RT/LT      RE12/19/2013, LE12/10/2013 - Toric lenses     CHOLECYSTECTOMY  1991     COLONOSCOPY       COLONOSCOPY  3/10/2014    Procedure: COLONOSCOPY;;  Surgeon: Gentry Ramirez MD;  Location:  GI     ENDOSCOPIC RETROGRADE CHOLANGIOPANCREATOGRAM  9/19/2013    Procedure: ENDOSCOPIC RETROGRADE CHOLANGIOPANCREATOGRAM;  Endoscopic Retrograde Cholangiopancreatogram with single balloon enteroscopy, " "ballon sweep of bile duct;  Surgeon: Brett Membreno MD;  Location: [facility]     ENT SURGERY      ear drum repair     HC KNEE SCOPE,MED/LAT MENISECTOMY  8/10/12    Right, partial medial menisectomy only     KNEE SURGERY  1966    R knee     PICC INSERTION  9/18/2013    4fr SL PASV PICC, 40cm (1cm external) in the R basilic vein w/ tip in the low SVC     PICC INSERTION  2/21/2014    5 fr DL BioFlo Navilyst PICC, 46 cm (3 cm external) in the L basilic vein w/ tip in the SVC RA junction.     THYROIDECTOMY  3/2010     TRANSPLANT LIVER RECIPIENT LIVING UNRELATED         Soc Hx:   Social History   Substance Use Topics     Smoking status: Former Smoker     Packs/day: 1.00     Years: 18.00     Types: Cigarettes     Quit date: 4/12/1985     Smokeless tobacco: Never Used     Alcohol use 0.0 oz/week     0 Standard drinks or equivalent per week      Comment: rare - \"I toast at weddings\"       Allergies:   Allergies   Allergen Reactions     Fluconazole Hives and Itching     Azithromycin Itching     Benadryl [Diphenhydramine Hcl]      Insomnia      Cefpodoxime Itching     Cellcept Diarrhea     Ciprofloxacin Other (See Comments)     Insomnia, mood lability     Codeine      Psych disturbance     Diphenhydramine Other (See Comments)     Doxycycline      Lansoprazole Diarrhea     Levaquin [Levofloxacin] Other (See Comments)     Headache, hyperactivity     Lisinopril Cough     Methotrexate      Sores     Morphine Itching     Morphine Sulfate Itching     Mycophenolate Diarrhea     Pepcid Diarrhea     Pravastatin Muscle Pain (Myalgia)     Prevacid Diarrhea     Ranitidine Diarrhea     Simvastatin Muscle Pain (Myalgia)     severe     Zantac Diarrhea     Cephalexin Rash     Fever and skin burning     Penicillin G Itching and Rash     Penicillins Rash     Tolectin [Nsaids] Rash     Tramadol Rash       Meds:     Current Facility-Administered Medications on File Prior to Encounter:  [COMPLETED] fentaNYL (PF) (SUBLIMAZE) injection 50 mcg "   [DISCONTINUED] lactated ringers infusion   [DISCONTINUED] midazolam (VERSED) injection   [DISCONTINUED] glycopyrrolate (ROBINUL) injection   [DISCONTINUED] fentaNYL (PF) (SUBLIMAZE) injection   [DISCONTINUED] propofol (DIPRIVAN) injection 10 mg/mL vial   [DISCONTINUED] lidocaine injection 2% (MDV)   [DISCONTINUED] propofol (DIPRIVAN) injection 10 mg/mL vial     Current Outpatient Prescriptions on File Prior to Encounter:  zolpidem (AMBIEN) 5 MG tablet Take tablet by mouth 15 minutes prior to sleep, for Sleep Study   metoprolol (TOPROL-XL) 50 MG 24 hr tablet Take 1 tablet (50 mg) by mouth daily   apixaban ANTICOAGULANT (ELIQUIS) 2.5 MG tablet Take 1 tablet (2.5 mg) by mouth 2 times daily   folic acid (FOLVITE) 1 MG tablet Take 1 tablet (1 mg) by mouth daily   ezetimibe (ZETIA) 10 MG tablet Take 1 tablet (10 mg) by mouth daily , or as directed by Dr. Reyna. (Patient taking differently: Take 10 mg by mouth every other day , or as directed by Dr. Reyna.)   furosemide (LASIX) 20 MG tablet Take 0.5 tablets (10 mg) by mouth daily   STATIN NOT PRESCRIBED, INTENTIONAL, Please choose reason not prescribed, below   hydrALAZINE (APRESOLINE) 25 MG tablet Take 0.5 tablets (12.5 mg) by mouth 3 times daily as needed , if systolic blood pressure is more than 140 mmHg.   levothyroxine (SYNTHROID/LEVOTHROID) 150 MCG tablet Take one tablet daily 6 days a week and one and a half tablets one day a week.   calcitRIOL (ROCALTROL) 0.5 MCG capsule Take 1 capsule (0.5 mcg) by mouth daily   voriconazole (VFEND) 200 MG tablet Take 1 tablet (200 mg) by mouth 2 times daily   predniSONE (DELTASONE) 5 MG tablet Take 1 tablet (5 mg) by mouth daily   RAPAMUNE 0.5 MG PO TABLET Take 0.5 mg by mouth every other day   albuterol (ALBUTEROL) 108 (90 BASE) MCG/ACT Inhaler Inhale 2 puffs into the lungs every 4 hours as needed for shortness of breath / dyspnea or wheezing Reported on 4/25/2017   clotrimazole (LOTRIMIN) 1 % cream Apply topically 2 times  daily as needed Reported on 2017   ursodiol (ACTIGALL) 250 MG tablet Take 1 tablet (250 mg) by mouth 2 times daily   Wheat Dextrin (BENEFIBER) POWD 2 teaspoons daily   SUMAtriptan (IMITREX) 50 MG tablet Take 1 tablet (50 mg) by mouth at onset of headache for migraine repeat after 2 hours if needed.   meclizine (ANTIVERT) 25 MG tablet Take 1 tablet (25 mg) by mouth as needed   STATIN NOT PRESCRIBED, INTENTIONAL, Statin not prescribed intentionally due to Intolerance (with supporting documentation of trying a statin at least once within the last 5 years)   Multiple Vitamins-Minerals (PRESERVISION AREDS 2) CAPS Take 2 capsules by mouth daily (Patient taking differently: Take 1 capsule by mouth 2 times daily )   triamcinolone (KENALOG) 0.1 % cream Apply topically 2 times daily Apply to rash (Patient taking differently: Apply topically 2 times daily as needed Apply to rash)   Hypromellose (ARTIFICIAL TEARS OP) Apply 1 drop to eye as needed   acetaminophen 500 MG CAPS Take 500 mg by mouth as needed Take 500-1,000 mg by mouth every 6 hours if needed.   magnesium 250 MG tablet Take 2 tablets by mouth daily At night.        Labs:    Blood Bank:  Lab Results   Component Value Date    ABO O 2013    RH  Pos 2013    AS Neg 2013     BMP:  Recent Labs   Lab Test  17   0905   NA  139   POTASSIUM  3.6   CHLORIDE  101   CO2  29   BUN  34*   CR  1.29*   GLC  103*   KEILY  9.5     CBC:   Recent Labs   Lab Test  17   0843   WBC  8.7   RBC  3.99   HGB  11.3*   HCT  35.3   MCV  89   MCH  28.3   MCHC  32.0   RDW  16.6*   PLT  344     Coags:  Recent Labs   Lab Test  16   0827  16   0940   INR  1.02  1.11   PTT   --   33         ECG results from 17  -24 Hour Blood Pressure Monitor - Adult       Narrative   [facility]  [address] [facility] [address]  [phone]  2017      Patient:  Luz Thompson  Chart: [id number]  :  1950  Age:  67 year old  Sex:   female      Procedure:  Blood Pressure Monitor: Please see scanned document for result once interpretation is completed.        Technician performing hook-up:  Hair Dumont       *Note: Due to a large number of results and/or encounters for the requested time period, some results have not been displayed. A complete set of results can be found in Results Review.      No results found for this or any previous visit (from the past 8760 hour(s)).                        .

## 2017-08-31 NOTE — IP AVS SNAPSHOT
[address] [address]    Phone:  [phone]                                       After Visit Summary   8/31/2017    Luz Thompson    MRN: [id number]           After Visit Summary Signature Page     I have received my discharge instructions, and my questions have been answered. I have discussed any challenges I see with this plan with the nurse or doctor.    ..........................................................................................................................................  Patient/Patient Representative Signature      ..........................................................................................................................................  Patient Representative Print Name and Relationship to Patient    ..................................................               ................................................  Date                                            Time    ..........................................................................................................................................  Reviewed by Signature/Title    ...................................................              ..............................................  Date                                                            Time

## 2017-08-31 NOTE — PROGRESS NOTES
CRS update    Upon arrival to .  Pt's abdominal pain is down to a 4.  Was at a 6.  Still not quite optimally controlled.  Has passed a few teaspoons to tablespoons of blood per rectum.  No gas/stool.      VS reviewed.    NAD.  Non-toxic.   Norm resp effort  abd soft.  Non-distended.  Mild tenderness lower mid to left abdomen. Not peritonitic.   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  Pt underwent an outpatient screening colonoscopy today which was complicated by a small 6mm perforation in the rectum and had 3 clips placed.  CT scan showed new surgical clips in the rectum with small adjacent hematoma, small volume air on the right extending to mesorectal fascia.      - admit to CRS  - NPO  - IVF  - IV dilaudid for pain  - re-CT scan in 24-48 hours pending clinical picture  - serial abdominal exams.  Low threshold for OR.   - apprec ID consult.  Started meropenem at this time.  - apprec Hepatology management of immunosuppression.  Hepatology recommended to hold both prednisone and rapamune.   - will check UA as pt has been experiencing urinary frequency today  - discussed prednisone and rapamune again with Dr. Montano and that pt insists on continuing both.  Dr. Montaon will further d/w Dr. Ramirez.   - Compression stockings during the day.  PCD during the evening and while sleeping.   - hold home eliqis 2/2 rectal bleeding.      - reviewed home medications with patient.  Pt insists that prednisone, rapamune, ursodiol are continued.  Will only make adjustments to her immunosuppression regimen if Dr. Ramirez is in agreement.  Of note, must take Rapamune Brand.   will bring home supply.  Pt can use home supply if Brand is not available  in-house.  Pt also insists that zetia (10mg and 5mg alternated every other day) is continued as she has been working hard on optimizing this as an outpatient and is currently in the midst of up-titrating this.  Would also like to continue her synthroid orally (150mcg tues-Sunday, 225mcg every Monday), and AM hydralazine 12.5mg daily.  Is ok with metoprolol IV.  Did discuss rationale behind NPO status and taking as little as possible by mouth.      Mariola Orozco PA-C  Colon and Rectal Surgery   [id number]    This was discussed with Fellow, Dr. Neely.       Received page from pt's transplant coordinator, Angelika ([phone]), who has discussed with Dr. Ramirez.  Recommend Hepatology inpatient consult.  Stop Rapamune.  Continue prednisone.  Consider starting Tac or Cyclosporin.  Apprec In-patient Hepatology to further advise.

## 2017-08-31 NOTE — OR NURSING
Procedure: Colonoscopy with 3 boston scientific clips placed for a perforation.  Sedation: MAC  Tolerated Procedure: Well  Patient returned to recovery room in stable condition with RN  Other: Stat CT of ABD/Pelvis after colon  Dora Lopez RN

## 2017-08-31 NOTE — CONSULTS
[facility]  Transplant Infectious Disease Consult Note - New Patient     Patient:  Luz Thompson, Date of birth 1950, Medical record number [id number]  Date of Visit:  08/31/2017  Consult requested by Dr. Yuridia Frost for evaluation of rectal perforation at colonoscopy         Assessment and Recommendations:   Recommendations:  - I will change voriconazole to IV, in the event that she needs to go emergently to the OR this evening.  - Thank you for starting meropenem per our phone conversation earlier today.  - I have adjusted her allergy list to remove some questionable meds from her list.    Thank you very much for this consultation. Transplant Infectious Disease will continue to follow with you.    Assessment:  Virginia is a 67 year old woman immunosuppressed s/p 2002 liver transplant with recurrent gram negative sepsis. She has known sigmoid diverticulosis. She gets episodes of defecation syncope.  Infectious Disease issues include:  - Rectal perforation at colonoscopy earlier today. Eloquis and rapamune stopped to assist with wound healing. Bronchoscopy postponed so that any interior pressure created by coughing does not open perf any further. Given many allergies, will use meropenem to cover aerobes and anaerobes.  - Cocci infection. Followup CT imaging shows just a little increased uptake in lung nodule on PET imaging, so we are continuing voriconazole while she is undergoing a malignancy workup. Her liver function tests are normal while on voriconazole. I will followup on any bronchoscopy lung biopsy type procedures that done. Will change vori to IV for now.   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  - Self-triggered use of prn antibiotics due to recurrent episodes of sepsis. She gets a lot of itching with vantin, and quinolones and macrolides don't seem to work that well for her, so her current outpatient prn med is bactrim. If the bactrim  does not do the job in controlling fever, because she has allergies to penicillins and quinolones (cipro and levaquin), she knows to be seen somewhere where she could be evaluated for a once daily infusion of ertapenem.   - Hx of bacterial superinfection of chronic venous stasis changes on the legs, 7/27/2016.  - Hx of recurrent E coli sepsis 8/13/2013, 6/10/2015.   - Hx of Klebsiella UTI, 7/18/16. She completed a 14-day course of macrobid.  - Hx of Haemophilus influenzae pneumonia in 9/2014.  - Hx of angular chelitis, hx of thrush extending from under her upper denture plate, and onychomycosis.   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   - PCP prophylaxis: Not needed, as most recent CD4 count was > 200.   - Serostatus: CMV seronegative, EBV seropositive on 8/15/13.  - Immunization status: Completed PCV13 and PPSV23 x2 with last dose in 5/2016.  - Gamma globulin status: Endogenously replete.  - Isolation status: Good hand hygience. When she is an inpatient, she needs to be in contact isolation for known VRE carriage.    Crystal Montero,   Pager [phone]         History of Infectious Disease Illness:   Ms Thompson is a 67 year old woman immunosuppressed (sirolimus, prednisone) s/p 5/22/02 orthotopic liver transplant for primary biliary cirrhosis. She has venous stasis changes of her legs, and she can get stasis dermatitis if she does not wear her compression stockings. This is especially problematic if the weather is very warm and for that reason she does not want to wear compression stockings. She received Rituxan therapy for EBV viremia on 8/31/2016. She had some side effects in the days following the dose of Rituxan, but otherwise would be able to tolerate it again in the future. In the 2573-7219 winter season, she was diagnosed with coccidioidomycosis. There was a strong pulmonary component to the infection, and she is being treated with voriconazole. She had a 7/31/2017 visit with pulmonary. There is a lung nodule in the  lingula, and a slightly enlarging rounded nodular opacity. Differential includes developing granuloma from resolved infection, new or superimposed infection, malignancy. She then had a PET/CT which shows that the nodule is avid, so there is a plan to pursue a percutaneous lung biopsy shortly.       Since Virginia was last seen by me in clinic on 8/16/2017, she was in the procedure area today for colonscopy and bronchoscopy. There was a rectal perforation. CT imaging showed some air. I was called and we started meropenem. Pain is now finally controlled, she has been admitted. Overnight plan is to CT in 24-48 hours, and then based on that decide whether to proceed with surgery. It will take 3-5 days for the hole to self-seal in most patients. Rapa and Eloquis were d/c to assist in the healing process.    Transplants:  5/22/2002 (Liver), Postoperative day:  5580.  Coordinator Angelika Luo    Review of Systems:  CONSTITUTIONAL:  No fevers or chills  EYES: negative for icterus or acute vision changes  ENT:  negative for acute hearing loss, tinnitus. She woke up with a sore throat after the colonoscopy.  RESPIRATORY:  negative for cough, sputum or dyspnea  CARDIOVASCULAR:  negative for chest pain, palpitations  GASTROINTESTINAL:  NPO for now. Negative for nausea, vomiting. There is some rectal bleeding, teaspoon amounts every 2 hours.   GENITOURINARY:  negative for dysuria or hematuria  HEME:  + easy bruising & bleeding  INTEGUMENT:  negative for rash or pruritus  NEURO:  Negative for headache or tremor    Past Medical History:   Diagnosis Date     Anemia of other chronic disease 10/17/2011     Anxiety      Bladder infection, chronic 4/4/2012     CKD (chronic kidney disease) stage 3, GFR 30-59 ml/min 4/4/2012     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     Diverticulosis of sigmoid colon 12/21/2013     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     Received Rituxan during Summer of 2016     Glaucoma      H/O esophageal varices       "Hearing loss      Heart murmur 4/4/2012     History of DVT (deep vein thrombosis)      History of [place] fever      History of thyroid cancer 9/25/2012     Hyperlipidemia 4/10/2012    Says that she does not have it anymore, not on meds     Hypertension goal BP (blood pressure) < 140/80 11/6/2013     Hypertriglyceridemia      Liver replaced by transplant (H) 10/17/2011    Dr. Gentry Ramirez, [facility]       Macular degeneration      Migraines 4/4/2012     Osteoarthritis of right knee 8/2/2012     Osteoporosis 4/20/2012     Paroxysmal a-fib (H) 6/13/2017     Post-surgical hypothyroidism      Postablative hypothyroidism 8/13/2012     Primary biliary cirrhosis (H)     s/p Liver transplant, [id number]     Sjogren's syndrome (H)      Statin intolerance      Unspecified cerebral artery occlusion with cerebral infarction 2001    when BP was very low, small multiple infacts in frontal lobe, had \"visual field cut,\" leg weakness, and expressive aphasia - all have resolved.      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       Past Surgical History:   Procedure Laterality Date     APPENDECTOMY  1961     BIOPSY       CATARACT IOL, RT/LT      RE12/19/2013, LE12/10/2013 - Toric lenses     CHOLECYSTECTOMY  1991     COLONOSCOPY       COLONOSCOPY  3/10/2014    Procedure: COLONOSCOPY;;  Surgeon: Gentry Ramirez MD;  Location:  GI     ENDOSCOPIC RETROGRADE CHOLANGIOPANCREATOGRAM  9/19/2013    Procedure: ENDOSCOPIC RETROGRADE CHOLANGIOPANCREATOGRAM;  Endoscopic Retrograde Cholangiopancreatogram with single balloon enteroscopy, ballon sweep of bile duct;  Surgeon: Brett Membreno MD;  Location:  OR     ENT SURGERY      ear drum repair     HC KNEE SCOPE,MED/LAT MENISECTOMY  8/10/12    Right, partial medial menisectomy only     KNEE SURGERY  1966    R knee     PICC INSERTION  9/18/2013    4fr SL PASV PICC, 40cm (1cm external) in the R basilic vein w/ tip in the low SVC     PICC " "INSERTION  2014    5 fr DL BioFlo Navilyst PICC, 46 cm (3 cm external) in the L basilic vein w/ tip in the SVC RA junction.     THYROIDECTOMY  3/2010     TRANSPLANT LIVER RECIPIENT LIVING UNRELATED         Family History   Problem Relation Age of Onset     Hypertension Mother      Endometrial Cancer Mother      Hyperlipidemia Mother      Prostate Cancer Father      Macular Degeneration Father      Cancer - colorectal Maternal Grandmother      in her 80's, has surgery and removal of part of kidney,  at age 98     HEART DISEASE Maternal Grandfather       at 98     Glaucoma Maternal Grandfather      CEREBROVASCULAR DISEASE Paternal Grandmother      in her 80's     Hypertension Paternal Grandmother      HEART DISEASE Paternal Grandfather      MI     Alzheimer Disease Paternal Grandfather      Allergies Son      Neurologic Disorder Daughter      Migraines     Breast Cancer Other      Anesthesia Reaction No family hx of      Crohn Disease No family hx of      Ulcerative Colitis No family hx of        Social History     Social History Narrative    She is retired. She and her  travel around the [place] in an [place]. [place] in the [place] the course of the [place]. [place], corn and soybean crops.     Social History   Substance Use Topics     Smoking status: Former Smoker     Packs/day: 1.00     Years: 18.00     Types: Cigarettes     Quit date: 1985     Smokeless tobacco: Never Used     Alcohol use 0.0 oz/week     0 Standard drinks or equivalent per week      Comment: rare - \"I toast at weddings\"       Immunization History   Administered Date(s) Administered     Influenza (High Dose) 3 valent vaccine 2015, 10/30/2016     Influenza (IIV3) 2012, 2013, 2016     Pneumococcal (PCV 13) 2014     Pneumococcal 23 valent 2007, 2016     TD (ADULT, 7+) 2007     " TDAP Vaccine (Adacel) 02/26/2014       Patient Active Problem List   Diagnosis     Bladder infection, chronic     Osteoporosis     Osteoarthritis of right knee     HDL deficiency     Advanced directives, counseling/discussion     Vitamin D deficiency     S/P tympanoplasty     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     Prophylactic antibiotic     High risk medication use     Statin intolerance     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     Coccidioidomycosis     Coccidioidal pneumonitis (H)     SNHL (sensorineural hearing loss)     Abnormal liver function tests     Diagnostic skin and sensitization tests     Perforation bowel (H)            Current Medications & Allergies:       meropenem  1 g Intravenous Q6H     fentaNYL (PF)  50 mcg Intravenous Once     fentaNYL (PF)  50 mcg Intravenous Once     ezetimibe  10 mg Oral Every Other Day     [START ON 9/1/2017] levothyroxine  150 mcg Oral Once per day on Sun Tue Wed Thu Fri Sat     [START ON 9/1/2017] predniSONE  5 mg Oral Daily     ursodiol  250 mg Oral BID     voriconazole  200 mg Oral BID     [START ON 9/1/2017] hydrALAZINE  12.5 mg Oral Daily     [START ON 9/4/2017] levothyroxine  225 mcg Oral Q7 Days     [START ON 9/1/2017] ezetimibe  5 mg Oral Every Other Day     metoprolol  5 mg Intravenous Q6H       Infusions/Drips:    NaCl 75 mL/hr at 08/31/17 1417       Allergies   Allergen Reactions     Fluconazole Hives and Itching     Azithromycin Itching     Benadryl [Diphenhydramine Hcl]      Insomnia      Cefpodoxime Itching     Cellcept Diarrhea     Ciprofloxacin Other (See Comments)     Insomnia, mood lability     Codeine      Psych disturbance     Diphenhydramine Other (See Comments)     Doxycycline      Lansoprazole Diarrhea     Levaquin [Levofloxacin] Other (See Comments)     Headache, hyperactivity     Lisinopril Cough     Methotrexate      Sores     Morphine Itching     Morphine Sulfate Itching     Mycophenolate Diarrhea     Pravastatin Muscle Pain (Myalgia)     Simvastatin Muscle Pain (Myalgia)  "    severe     Cephalexin Rash     Fever and skin burning     Penicillin G Itching and Rash     Tolectin [Nsaids] Rash     Tramadol Rash            Physical Exam:   Vitals were reviewed.  All vitals stable.  Patient Vitals for the past 24 hrs:   BP Temp Temp src Resp SpO2 Height Weight   08/31/17 1626 162/75 - - - - - -   08/31/17 1504 165/67 98.1  F (36.7  C) Oral 16 97 % - -   08/31/17 1320 168/71 - - - - - -   08/31/17 1253 173/68 - Oral 16 95 % - -   08/31/17 1040 155/64 - - - 96 % - -   08/31/17 1032 150/71 - - - 97 % - -   08/31/17 1000 175/69 - - (!) 0 100 % - -   08/31/17 0949 168/90 - - 20 100 % - -   08/31/17 0946 - - - 15 100 % - -   08/31/17 0919 - - - 14 100 % - -   08/31/17 0918 - - - 12 100 % - -   08/31/17 0917 - - - 17 100 % - -   08/31/17 0916 - - - 14 100 % - -   08/31/17 0914 - - - 13 100 % - -   08/31/17 0912 - - - 13 100 % - -   08/31/17 0911 - - - 15 100 % - -   08/31/17 0910 135/68 - - - - - -   08/31/17 0909 - - - 16 100 % - -   08/31/17 0717 171/75 - - - 100 % 1.715 m (5' 7.5\") 84.8 kg (187 lb)     Ranges for vital signs:  Temp:  [98.1  F (36.7  C)] 98.1  F (36.7  C)  Heart Rate:  [61-74] 74  Resp:  [0-20] 16  BP: (135-175)/(64-90) 162/75  SpO2:  [95 %-100 %] 97 %  Vitals:    08/31/17 0717   Weight: 84.8 kg (187 lb)       Physical Examination:  GENERAL:  well-developed, well-nourished, in bed in no acute distress.  HEAD:  Head is normocephalic, atraumatic   EYES:  Eyes have anicteric sclerae without conjunctival injection   ENT:  Oropharynx is moist without exudates or ulcers. Tongue is midline. Hearing aids in place today. Dentures in place, no thrush.  NECK:  Supple. No cervical lymphadenopathy  LUNGS:  Clear to auscultation bilateral.   CARDIOVASCULAR:  Regular rate and rhythm with 2/6 systolic murmur heard best at RUSB but no gallops or rubs.  ABDOMEN:  Normal bowel sounds, soft, but a little tender above her bladder.   SKIN:  No acute rashes.  PIV in place without any surrounding " erythema or exudate. She is wearing compression stockings. Onychomycosis not examined today, as her toes are covered by the compression stockings.   NEUROLOGIC:  Grossly nonfocal. Active x4 extremities         Laboratory Data:     Absolute CD4   Date Value Ref Range Status   08/19/2014 415 mm3 Final   09/16/2013 1266 mm3 Final   09/15/2013 DUPLICATE,TESTING DONE ON SPECIMEN FROM 9.16.13 mm3 Final   11/13/2008 1332 mm3 Final       Inflammatory Markers  Recent Labs   Lab Test  05/14/16   0659  05/13/16   0940  09/23/14   0810  08/19/14   1530  07/23/14   1119  06/30/14   0825  05/15/14   1112  05/08/14   0806   SED   --   67*  79*  76*   --   51*  58*  51*   CRP  21.0*  19.0*  6.1  29.8*  13.7*  12.5*  8.0  <5.0       Immune Globulin Studies   Recent Labs   Lab Test  08/19/14   1530  05/21/12   0754   IGG  1220  1070   IGM   --   97   IGE  46   --    IGA   --   85   IGG1  684   --    IGG2  441   --    IGG3  70   --    IGG4  19   --        Metabolic Studies     Recent Labs   Lab Test  08/31/17   1306  08/22/17   0905  07/13/17   0843  05/10/17   0929   05/13/16   2037  05/13/16   1723  05/13/16   0940   07/23/14   1119   02/19/14   0700   NA  138  139  139  138   < >   --   138  132*   < >  137   < >  141   POTASSIUM  3.9  3.6  3.6  4.2   < >   --   3.9  3.6   < >  3.8   < >  4.7   CHLORIDE  101  101  102  102   < >   --   104  96   < >  98   < >  111*   CO2  32  29  28  26   < >   --   25  26   < >  27   < >  21   ANIONGAP  4  9  9  10   < >   --   8  10   < >  12   < >  8   BUN  22  34*  33*  36*   < >   --   25  29   < >  35*   < >  36*   CR  1.17*  1.29*  1.30*  1.53*   < >   --   1.55*  1.64*   < >  1.70*   < >  1.48*   GFRESTIMATED  46*  41*  41*  34*   < >   --   33*  31*   < >  30*   < >  36*   GLC  128*  103*  110*  104*   < >   --   89  98   < >  109*   < >  131*   KEILY  8.7  9.5  9.3  9.7   < >   --   8.4*  9.1   < >  9.1   < >  8.3*   PHOS   --    --    --   3.1   < >   --    --    --    < >   --    < >   --     MAG   --    --   2.4*  2.8*   < >   --    --   2.2   < >   --    < >  2.4*   LACT   --    --    --    --    --    --    --   0.8   --   1.7   --    --    PCAL   --    --    --    --    --    --   0.38   --    --   0.21   --    --    FGTL   --    --    --    --    --   <31  Unit: pg/mL     --    --    < >   --    --    --    CKT   --    --    --    --    --    --    --   78   --    --    --   56    < > = values in this interval not displayed.       Hepatic Studies  Recent Labs   Lab Test  08/31/17   1306  08/22/17   0905  07/13/17   0843   07/24/14   0814   09/15/13   1431   12/20/12   2110   BILITOTAL  0.3  0.3  0.3   < >  0.4   < >   --    < >  <0.1*   BILIDELTA   --    --    --    --   0.3   < >   --    < >  0.0   BILICONJ   --    --    --    --   0.0   < >   --    < >  0.0   DBIL   --    --   <0.1   < >   --    --    --    --    --    ALKPHOS  233*  241*  272*   < >  183*   < >   --    < >  93   PROTTOTAL  8.0  8.3  7.5   < >  6.6*   < >   --    < >  4.9*   ALBUMIN  3.1*  3.3*  3.1*   < >  3.4   < >   --    < >  2.4*   AST  31  49*  44   < >  92*   < >   --    < >  25   ALT  50  107*  105*   < >  140*   < >   --    < >  31   LDH   --    --    --    --    --    --   603   --   621    < > = values in this interval not displayed.       Pancreatitis testing  Recent Labs   Lab Test  08/22/17   0905   07/23/14   1119   02/18/14   0852   11/06/13   1246   08/13/13   1046   LIPASE   --    --   32   --   55   --   59   --   52   TRIG  447*   < >   --    < >   --    < >   --    < >   --     < > = values in this interval not displayed.       Gout Labs      Recent Labs   Lab Test  12/31/13   1443  07/30/13   1441   URIC  6.7  6.7       Hematology Studies    Recent Labs   Lab Test  08/31/17   1306  07/13/17   0843  05/10/17   0929  04/11/17   1003  08/26/16   0944  07/18/16   1430   05/16/16   0827   WBC  10.6  8.7  9.2  8.9  8.9  11.3*   < >  5.7   ANEU  8.6*   --    --    --    --    --    --   2.5   ALYM  1.3   --    --     --    --    --    --   2.4   KATHY  0.7   --    --    --    --    --    --   0.6   AEOS  0.0   --    --    --    --    --    --   0.1   HGB  12.4  11.3*  12.5  11.5*  11.5*  10.9*   < >  11.1*   HCT  38.5  35.3  38.8  36.2  35.2  33.0*   < >  35.4   PLT  290  344  331  347  358  333   < >  256    < > = values in this interval not displayed.       Clotting Studies    Recent Labs   Lab Test  05/16/16   0827  05/13/16   0940  11/06/13   1246  10/14/13   0904   INR  1.02  1.11  0.96  0.98   PTT   --   33   --    --        Iron Testing  Recent Labs   Lab Test  08/31/17   1306   07/11/13   0814   04/18/13   0809  03/21/13   0812 12/22/12   1346   12/20/12 2005 12/07/12   1345   IRON   --    --   58   --   63  90   < >   --    --    --   48   FEB   --    --   285   --   315  285   < >   --    --    --   156*   IRONSAT   --    --   20   --   20  32   < >   --    --    --   30   LAURA   --    --   195   < >  232  202   < >   --    --    --   317*   MCV  86   < >  88   < >  92  98   < >  100   < >  103*   --    B12   --    --    --    --    --    --    --    --    --    --   281   HAPT   --    --    --    --    --    --    --    --    --   137   --    RETP   --    --    --    --    --    --    --   2.7*   --    --    --    RETICABSCT   --    --    --    --    --    --    --   71.0   --    --    --     < > = values in this interval not displayed.       Markers  Alpha Fetoprotein   Date Value Ref Range Status   03/26/2012 4.1 0 - 8 ug/L Final     Comment:     Reference ranges apply to non-pregnant females only.       Autoimmune Testing  Recent Labs   Lab Test  05/13/16   1723  08/19/14   1530   PRANAV  <1.0  Interpretation:  Negative    <1.0  Interpretation:  Negative     RHF   --   <20       Thyroid Studies     Recent Labs   Lab Test  07/13/17   0843  05/10/17   0929  07/18/16   1430  05/24/16   0846  07/20/15   1010   09/23/14   0810   05/08/14   0806   TSH  3.66  4.74*  2.43  3.65  3.41   < >  2.87   < >  4.41   T4  1.59*   1.48*  1.38  1.19  1.24   < >  1.29  9.1   --   1.19  9.1   T3   --    --    --    --    --    --   65   --   48*    < > = values in this interval not displayed.       Urine Studies   Recent Labs   Lab Test  08/22/17   0913  07/18/16   1503  05/13/16   1114  04/30/15   0951  09/22/14   1420  08/19/14   1410  07/23/14   1601   URINEPH  7.0  5.5  6.5  6.0  5.5  7.0  6.5   NITRITE  Negative  Negative  Negative  Negative  Negative  Negative  Negative   LEUKEST  Trace*  Large*  Moderate*  Negative  Negative  Negative  Small*   WBCU  2-5*  10-25*  4*   --    --   <1  <1       Medication levels  Recent Labs   Lab Test  07/13/17   0843   09/15/13   0435   12/21/12   1344   VANCOMYCIN   --    --   26.3   --    --    RAPAMY  5.8   < >   --    < >   --    MPACID   --    --    --    --   5.42*   MPAG   --    --    --    --   83.5    < > = values in this interval not displayed.       Microbiology:  Cryptococcal antigen    Recent Labs   Lab Test  08/14/13   0822   CRPTT  Negative for cryptococcal antigen A negative cryptococcal antigen test does not exclude cryptococcal infection. If  a fungal culture is desired, it must be ordered separately.       Beta D Glucan levels (Fungitell assay)    Recent Labs   Lab Test  05/13/16   2037  08/19/14   1530   FGTL  <31  Unit: pg/mL    <31  Unit: pg/mL         Last Culture results with specimen source  Culture Micro   Date Value Ref Range Status   07/18/2016 (A)  Final    50,000 to 100,000 colonies/mL Klebsiella pneumoniae   05/21/2016 No growth  Final   05/21/2016 No growth  Final   05/16/2016 Canceled, Test credited Duplicate request  Final   05/16/2016 Canceled, Test credited Duplicate request  Final   05/16/2016 No growth  Final   05/16/2016 No growth  Final   05/13/2016 No growth after 29 days  Final   05/13/2016   Final    Canceled, Test credited Quantity not sufficient Notification of test cancellation   was given to [facility]THEW FROM [facility], HE WILL REORDER AND RECOLLECT      05/13/2016 No growth  Final   05/13/2016 No growth  Final   05/13/2016   Final    10,000 to 50,000 colonies/mL mixed urogenital louann  Susceptibility testing not routinely done     05/13/2016 No growth  Final   09/25/2014 (A)  Final    Normal louann  Moderate growth Haemophilus influenzae Beta lactamase negative     09/03/2014 (A)  Final    Normal louann  Heavy growth Haemophilus influenzae Beta lactamase negative  beta lactamase negative isolates are susceptible to ampicillin,   amoxacillin/clavulanic, levofloxacin and ceftriaxone     08/19/2014 No growth  Final   08/19/2014 No growth  Final   07/24/2014 No growth  Final   07/24/2014 No growth  Final    Specimen Description   Date Value Ref Range Status   07/18/2016 Midstream Urine  Final   05/21/2016 Blood Right Arm  Final   05/21/2016 Blood Left Arm  Final   05/16/2016 Blood  Final   05/16/2016 Blood  Final   05/16/2016 Blood Left Arm  Final   05/16/2016 Blood Right Arm  Final   05/13/2016 Blood Unspecified Site  Final   05/13/2016 Blood Unspecified Site  Final   05/13/2016 Blood Right Arm  Final   05/13/2016 Blood Right Arm  Final   05/13/2016 Midstream Urine  Final   05/13/2016 Nasal  Final   05/13/2016 Blood Venipuncture Collection VPT  Final   09/25/2014 Sputum  Final   09/25/2014 Sputum  Final   09/03/2014 Sputum  Final   09/03/2014 Sputum  Final   08/19/2014 Blood Right Arm  Final        Last check of C difficile  C Diff Toxin B PCR   Date Value Ref Range Status   05/17/2012   Final    Negative: Clostridium difficile target DNA sequences NOT detected, presumed   negative for Clostridium difficile toxin B or the number of bacteria present   may be below the limit of detection for the test.   FDA approved assay performed using "[employer] real-time PCR.   A negative result does not exclude actual disease due to Clostridium difficile   and may be due to improper collection, handling and storage of the specimen or   the number of organisms in the specimen  is below the detection limit of the   assay.       Infectious Disease Testing     Recent Labs   Lab Test  08/19/14   1529   TBRSLT  Negative       Virology:  EBV DNA Copies/mL   Date Value Ref Range Status   08/22/2017 EBV DNA Not Detected EBVNEG^EBV DNA Not Detected [Copies]/mL Final   04/11/2017 (A) EBVNEG [Copies]/mL Final    <500  EBV DNA Detected below the reportable range of 500 Copies/mL     12/09/2016 1219 (A) EBVNEG [Copies]/mL Final   08/08/2016 99811 (A) EBVNEG [Copies]/mL Final   07/26/2016 96902 (A) EBVNEG [Copies]/mL Final   05/24/2016 01040 (A) EBVNEG [Copies]/mL Final       BK Virus Testing   BK Virus Result   Date Value Ref Range Status   07/28/2011 <1000 <1000 copies/mL Final       BK Virus Testing   Recent Labs   Lab Test  07/28/11   1150   BKSPEC  Urine   BKRES  <1000   BKLOG  <3.0 The lower limit of detection for this assay is 1000 copies/mL.  Real-time TaqMan  PCR was performed using BK primers and probe for the detection of a 90 bp  portion of the  1 gene.  The performance characteristics were validated by  the [facility].  It has not been cleared or approved by the [place] Food and Drug Administration.       Hepatitis B Testing   Recent Labs   Lab Test  05/13/16   1723  03/26/12   0856   AUSAB  0.09   --    HBSAB   --   0.0   HBCAB  Nonreactive  Negative   HEPBANG  Nonreactive  Negative      Hepatitis C Antibody   Date Value Ref Range Status   05/13/2016  NR Final    Nonreactive   Assay performance characteristics have not been established for newborns,   infants, and children     03/26/2012 Negative NEG Final       CMV Antibody IgG   Date Value Ref Range Status   05/13/2016  0.0 - 0.8 AI Final    <0.2  Negative   Antibody index (AI) values reflect qualitative changes in antibody   concentration that cannot be directly associated with clinical condition or   disease state.       CMV IgG Antibody   Date Value Ref Range Status   08/15/2013 <0.20  Negative  for anti-CMV IgG U/mL Final     EBV Capsid Antibody IgG   Date Value Ref Range Status   05/13/2016 (H) 0.0 - 0.8 AI Final    >8.0  Positive, suggests recent or past exposure   Antibody index (AI) values reflect qualitative changes in antibody   concentration that cannot be directly associated with clinical condition or   disease state.       EBV VCA IgG Antibody   Date Value Ref Range Status   08/15/2013 >750.00  Positive, suggests immunologic exposure. U/mL Final       Imaging:  Recent Results (from the past 48 hour(s))   CT Abdomen Pelvis w/o Contrast    Narrative    EXAMINATION: CT ABDOMEN PELVIS W/O CONTRAST, 8/31/2017 9:47 AM    TECHNIQUE:  Helical CT images from the lung bases through the pubic  symphysis were obtained  without IV contrast.     COMPARISON: PET CT dated 8/8/2017.    HISTORY: rule out free air, rectal perforation from colonoscopy    FINDINGS:  Three new surgical clips project within the rectum. There is a small  adjacent hyperdense collection along the right rectal wall, measuring  1.8 x 2.1 cm in maximum transverse dimensions, and 2.6 cm  craniocaudally, series 4 image 70. Small foci of adjacent free air  extending along, but not beyond the mesorectal fascia. Otherwise,  numerous colonic diverticula multiple containing retained contrast. No  bowel dilatation or appreciable wall thickening. Surgical clips in the  region of the proximal duodenum. No suspicious lymphadenopathy. The  bladder is well distended. The uterus and adnexa are within normal  limits for the patient's age. No free fluid.    Postsurgical changes of prior partial liver transplantation.  Small-volume pneumobilia. The spleen, and adrenal glands are  unremarkable on this noncontrast exam. Fatty pancreatic atrophy. No  hydronephrosis. No acute osseous abnormality. Multiple ventral  fat-containing hernias as well as calcification within the abdominal  wall and gluteal regions.    The previously seen lingular nodule has mildly  increased in size,  currently measuring 15 x 16 mm in maximum transverse dimensions with  mild adjacent nodularity/atelectasis. Redemonstration of bibasilar  fibroatelectasis and calcified pleural plaques and nodules greatest in  the right lower lobe. Mild noncalcified nodularity in the right lower  lobe is similar to prior without definite acute superimposed process.      Impression    IMPRESSION:   1.  New surgical clips within the rectum with small adjacent hematoma  and small volume of air on the right extending to the mesorectal  fascia.  2.  Previously seen lingular nodule has mildly increased in size  compared to PET CT of 8/8/2017.  3.  The remainder of the exam is not significantly changed.    Findings were discussed with Dr. Montano on 8/31/2017 at 1015 am.    BRUNO LEMUS MD

## 2017-09-01 ENCOUNTER — APPOINTMENT (OUTPATIENT)
Dept: CT IMAGING | Facility: CLINIC | Age: 67
DRG: 393 | End: 2017-09-01
Attending: INTERNAL MEDICINE
Payer: MEDICARE

## 2017-09-01 LAB
ANION GAP SERPL CALCULATED.3IONS-SCNC: 6 MMOL/L (ref 3–14)
BASOPHILS # BLD AUTO: 0 10E9/L (ref 0–0.2)
BASOPHILS NFR BLD AUTO: 0.3 %
BUN SERPL-MCNC: 17 MG/DL (ref 7–30)
CALCIUM SERPL-MCNC: 8.2 MG/DL (ref 8.5–10.1)
CHLORIDE SERPL-SCNC: 110 MMOL/L (ref 94–109)
CO2 SERPL-SCNC: 27 MMOL/L (ref 20–32)
CREAT SERPL-MCNC: 1.39 MG/DL (ref 0.52–1.04)
CRP SERPL-MCNC: 51 MG/L (ref 0–8)
DIFFERENTIAL METHOD BLD: ABNORMAL
EOSINOPHIL # BLD AUTO: 0.1 10E9/L (ref 0–0.7)
EOSINOPHIL NFR BLD AUTO: 1.5 %
ERYTHROCYTE [DISTWIDTH] IN BLOOD BY AUTOMATED COUNT: 16.8 % (ref 10–15)
GFR SERPL CREATININE-BSD FRML MDRD: 38 ML/MIN/1.7M2
GLUCOSE SERPL-MCNC: 95 MG/DL (ref 70–99)
HCT VFR BLD AUTO: 35 % (ref 35–47)
HGB BLD-MCNC: 11.1 G/DL (ref 11.7–15.7)
IMM GRANULOCYTES # BLD: 0 10E9/L (ref 0–0.4)
IMM GRANULOCYTES NFR BLD: 0.4 %
LYMPHOCYTES # BLD AUTO: 1.7 10E9/L (ref 0.8–5.3)
LYMPHOCYTES NFR BLD AUTO: 17.8 %
MCH RBC QN AUTO: 27.5 PG (ref 26.5–33)
MCHC RBC AUTO-ENTMCNC: 31.7 G/DL (ref 31.5–36.5)
MCV RBC AUTO: 87 FL (ref 78–100)
MONOCYTES # BLD AUTO: 1 10E9/L (ref 0–1.3)
MONOCYTES NFR BLD AUTO: 10.6 %
NEUTROPHILS # BLD AUTO: 6.5 10E9/L (ref 1.6–8.3)
NEUTROPHILS NFR BLD AUTO: 69.4 %
NRBC # BLD AUTO: 0 10*3/UL
NRBC BLD AUTO-RTO: 0 /100
PLATELET # BLD AUTO: 269 10E9/L (ref 150–450)
POTASSIUM SERPL-SCNC: 3.4 MMOL/L (ref 3.4–5.3)
RBC # BLD AUTO: 4.04 10E12/L (ref 3.8–5.2)
SODIUM SERPL-SCNC: 142 MMOL/L (ref 133–144)
WBC # BLD AUTO: 9.4 10E9/L (ref 4–11)

## 2017-09-01 PROCEDURE — 25000125 ZZHC RX 250: Performed by: PHYSICIAN ASSISTANT

## 2017-09-01 PROCEDURE — 25000128 H RX IP 250 OP 636: Performed by: STUDENT IN AN ORGANIZED HEALTH CARE EDUCATION/TRAINING PROGRAM

## 2017-09-01 PROCEDURE — 25000132 ZZH RX MED GY IP 250 OP 250 PS 637: Mod: GY | Performed by: INTERNAL MEDICINE

## 2017-09-01 PROCEDURE — 86140 C-REACTIVE PROTEIN: CPT | Performed by: PHYSICIAN ASSISTANT

## 2017-09-01 PROCEDURE — 25000128 H RX IP 250 OP 636

## 2017-09-01 PROCEDURE — 25000128 H RX IP 250 OP 636: Performed by: INTERNAL MEDICINE

## 2017-09-01 PROCEDURE — 85025 COMPLETE CBC W/AUTO DIFF WBC: CPT | Performed by: PHYSICIAN ASSISTANT

## 2017-09-01 PROCEDURE — 99211 OFF/OP EST MAY X REQ PHY/QHP: CPT

## 2017-09-01 PROCEDURE — 12000008 ZZH R&B INTERMEDIATE UMMC

## 2017-09-01 PROCEDURE — 40000901 ZZH STATISTIC WOC PT EDUCATION, 0-15 MIN

## 2017-09-01 PROCEDURE — A9270 NON-COVERED ITEM OR SERVICE: HCPCS | Mod: GY | Performed by: INTERNAL MEDICINE

## 2017-09-01 PROCEDURE — 25000132 ZZH RX MED GY IP 250 OP 250 PS 637: Mod: GY | Performed by: PHYSICIAN ASSISTANT

## 2017-09-01 PROCEDURE — 40000141 ZZH STATISTIC PERIPHERAL IV START W/O US GUIDANCE

## 2017-09-01 PROCEDURE — 25000128 H RX IP 250 OP 636: Performed by: PHYSICIAN ASSISTANT

## 2017-09-01 PROCEDURE — 25000128 H RX IP 250 OP 636: Performed by: RADIOLOGY

## 2017-09-01 PROCEDURE — A9270 NON-COVERED ITEM OR SERVICE: HCPCS | Mod: GY | Performed by: PHYSICIAN ASSISTANT

## 2017-09-01 PROCEDURE — 80048 BASIC METABOLIC PNL TOTAL CA: CPT | Performed by: PHYSICIAN ASSISTANT

## 2017-09-01 PROCEDURE — 36415 COLL VENOUS BLD VENIPUNCTURE: CPT | Performed by: PHYSICIAN ASSISTANT

## 2017-09-01 PROCEDURE — 74177 CT ABD & PELVIS W/CONTRAST: CPT

## 2017-09-01 RX ORDER — POTASSIUM CHLORIDE 7.45 MG/ML
10 INJECTION INTRAVENOUS
Status: DISCONTINUED | OUTPATIENT
Start: 2017-09-01 | End: 2017-09-06

## 2017-09-01 RX ORDER — POTASSIUM CL/LIDO/0.9 % NACL 10MEQ/0.1L
10 INTRAVENOUS SOLUTION, PIGGYBACK (ML) INTRAVENOUS
Status: DISCONTINUED | OUTPATIENT
Start: 2017-09-01 | End: 2017-09-06

## 2017-09-01 RX ORDER — SODIUM CHLORIDE 9 MG/ML
INJECTION, SOLUTION INTRAVENOUS
Status: COMPLETED
Start: 2017-09-01 | End: 2017-09-01

## 2017-09-01 RX ORDER — IOPAMIDOL 755 MG/ML
115 INJECTION, SOLUTION INTRAVASCULAR ONCE
Status: COMPLETED | OUTPATIENT
Start: 2017-09-01 | End: 2017-09-01

## 2017-09-01 RX ADMIN — URSODIOL 250 MG: 250 TABLET ORAL at 16:28

## 2017-09-01 RX ADMIN — LEVOTHYROXINE SODIUM 150 MCG: 150 TABLET ORAL at 09:17

## 2017-09-01 RX ADMIN — METOPROLOL TARTRATE 5 MG: 5 INJECTION INTRAVENOUS at 23:11

## 2017-09-01 RX ADMIN — MEROPENEM 1 G: 1 INJECTION, POWDER, FOR SOLUTION INTRAVENOUS at 01:55

## 2017-09-01 RX ADMIN — SUMATRIPTAN 50 MG: 50 TABLET, FILM COATED ORAL at 10:33

## 2017-09-01 RX ADMIN — MEROPENEM 1 G: 1 INJECTION, POWDER, FOR SOLUTION INTRAVENOUS at 23:12

## 2017-09-01 RX ADMIN — MEROPENEM 1 G: 1 INJECTION, POWDER, FOR SOLUTION INTRAVENOUS at 09:10

## 2017-09-01 RX ADMIN — HYDROMORPHONE HYDROCHLORIDE 0.5 MG: 1 INJECTION, SOLUTION INTRAMUSCULAR; INTRAVENOUS; SUBCUTANEOUS at 13:45

## 2017-09-01 RX ADMIN — SODIUM CHLORIDE 300 MG: 9 INJECTION, SOLUTION INTRAVENOUS at 19:20

## 2017-09-01 RX ADMIN — Medication 10 MEQ: at 17:29

## 2017-09-01 RX ADMIN — METOPROLOL TARTRATE 5 MG: 5 INJECTION INTRAVENOUS at 16:28

## 2017-09-01 RX ADMIN — PREDNISONE 5 MG: 5 TABLET ORAL at 09:11

## 2017-09-01 RX ADMIN — SODIUM CHLORIDE: 9 INJECTION, SOLUTION INTRAVENOUS at 14:20

## 2017-09-01 RX ADMIN — METOPROLOL TARTRATE 5 MG: 5 INJECTION INTRAVENOUS at 10:33

## 2017-09-01 RX ADMIN — MEROPENEM 1 G: 1 INJECTION, POWDER, FOR SOLUTION INTRAVENOUS at 16:28

## 2017-09-01 RX ADMIN — SODIUM CHLORIDE 300 MG: 9 INJECTION, SOLUTION INTRAVENOUS at 06:57

## 2017-09-01 RX ADMIN — IOPAMIDOL 115 ML: 755 INJECTION, SOLUTION INTRAVENOUS at 12:28

## 2017-09-01 RX ADMIN — URSODIOL 250 MG: 250 TABLET ORAL at 09:11

## 2017-09-01 RX ADMIN — METOPROLOL TARTRATE 5 MG: 5 INJECTION INTRAVENOUS at 04:18

## 2017-09-01 RX ADMIN — Medication 5 MG: at 19:20

## 2017-09-01 RX ADMIN — HYDROMORPHONE HYDROCHLORIDE 0.5 MG: 1 INJECTION, SOLUTION INTRAMUSCULAR; INTRAVENOUS; SUBCUTANEOUS at 07:15

## 2017-09-01 RX ADMIN — Medication 12.5 MG: at 09:11

## 2017-09-01 RX ADMIN — Medication 10 MEQ: at 14:20

## 2017-09-01 RX ADMIN — SODIUM CHLORIDE: 9 INJECTION, SOLUTION INTRAVENOUS at 07:15

## 2017-09-01 ASSESSMENT — PAIN DESCRIPTION - DESCRIPTORS: DESCRIPTORS: ACHING

## 2017-09-01 NOTE — PROGRESS NOTES
[facility]  Transplant Infectious Disease Progress Note      Patient:  Luz Thompson, Date of birth 1950, Medical record number [id number]  Date of Visit:  09/01/2017         Assessment and Recommendations:   Recommendations:  - continue meropenem.   - continue IV voriconazole while NPO, may resume PO at same dose prior to admission once cleared for PO intake.   - with next fever of 100.4 or higher please send blood cx x2 peripheral.       Assessment:  Virginia is a 67 year old woman immunosuppressed s/p 2002 liver transplant with recurrent gram negative sepsis. She has known sigmoid diverticulosis. She gets episodes of defecation syncope.  Infectious Disease issues include:  - Rectal perforation with fever after colonoscopy 8/31/2017. Eloquis and rapamune stopped to assist with wound healing. Bronchoscopy postponed so that any interior pressure created by coughing does not open perf any further. Given many allergies, she was started on meropenem to cover aerobes and anaerobes.  The fever is likely due to perforated viscus that may have resulted in transient bacteremia due to translocation of colonic bacteria. The CT is going to be repeated today to follow up on the perforation and assess wither abscess is present. We probably should check blood cx if fever recurred.   - Pulmonary Coccidiodes infection. Followup CT imaging shows just a little increased uptake in lung nodule on PET imaging. Bronchoscopy deferred due to rectal perforation. Will continue voriconazole IV and switch to PO once cleared for PO intake.     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  - Self-triggered use of prn antibiotics due to recurrent episodes of sepsis and cholangitis with bactrim. If not responding she usually presents to ED for ertapenem.   - Hx of bacterial superinfection of chronic venous stasis changes on the legs, 7/27/2016.  - Hx of  recurrent E coli sepsis 8/13/2013, 6/10/2015.   - Hx of Klebsiella UTI, 7/18/16. She completed a 14-day course of macrobid.  - Hx of Haemophilus influenzae pneumonia in 9/2014.  - Hx of angular chelitis, hx of thrush extending from under her upper denture plate, and onychomycosis.   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   - PCP prophylaxis: Not needed, as most recent CD4 count was > 200.   - Serostatus: CMV seronegative, EBV seropositive on 8/15/13.  - Immunization status: Completed PCV13 and PPSV23 x2 with last dose in 5/2016.  - Gamma globulin status: Endogenously replete.  - Isolation status: Good hand hygience. When she is an inpatient, she needs to be in contact isolation for known VRE carriage.    Branden Meraz   Pager: [phone]  9/1/2017            Interim History:   She is experiencing fresh blood per rectum, as little as smudges on toilet paper and sometimes a teespoonful.   Fever overnight noted.   No abdominal complaints, no N/V.         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 She has venous stasis changes of her legs, and she can get stasis dermatitis if she does not wear her compression stockings. This is especially problematic if the weather is very warm and for that reason she does not want to wear compression stockings. She received Rituxan therapy for EBV viremia on 8/31/2016. She had some side effects in the days following the dose of Rituxan, but otherwise would be able to tolerate it again in the future. In the 9413-8169 winter season, she was diagnosed with coccidioidomycosis. There was a strong pulmonary component to the infection, and she is being treated with voriconazole. She had a 7/31/2017 visit with pulmonary. There is a lung nodule in the lingula, and a slightly enlarging rounded nodular opacity. Differential includes developing granuloma from resolved  infection, new or superimposed infection, malignancy. She then had a PET/CT which shows that the nodule is avid, so there is a plan to pursue a percutaneous lung biopsy shortly.       Since Virginia was last seen by me in clinic on 8/16/2017, she was in the procedure area today for colonscopy and bronchoscopy. There was a rectal perforation. CT imaging showed some air. I was called and we started meropenem. Pain is now finally controlled, she has been admitted. Overnight plan is to CT in 24-48 hours, and then based on that decide whether to proceed with surgery. It will take 3-5 days for the hole to self-seal in most patients. Rapa and Eloquis were d/c to assist in the healing process.    Transplants:  5/22/2002 (Liver), Postoperative day:  5581.  Coordinator Angelika Luo    Review of Systems:  As mentioned in the interim history otherwise negative by reviewing central and peripheral neurological systems, respiratory system, cardiac system, GI system,  system, musculoskeletal, skin, allergy, and lymphatics.            Current Medications & Allergies:       meropenem  1 g Intravenous Q6H     ezetimibe  10 mg Oral Every Other Day     levothyroxine  150 mcg Oral Once per day on Sun Tue Wed Thu Fri Sat     predniSONE  5 mg Oral Daily     ursodiol  250 mg Oral BID     hydrALAZINE  12.5 mg Oral Daily     [START ON 9/4/2017] levothyroxine  225 mcg Oral Q7 Days     ezetimibe  5 mg Oral Every Other Day     metoprolol  5 mg Intravenous Q6H     voriconazole  4 mg/kg Intravenous Q12H       Infusions/Drips:    NaCl 75 mL/hr at 09/01/17 0715       Allergies   Allergen Reactions     Fluconazole Hives and Itching     Azithromycin Itching     Benadryl [Diphenhydramine Hcl]      Insomnia      Cefpodoxime Itching     Cellcept Diarrhea     Ciprofloxacin Other (See Comments)     Insomnia, mood lability     Codeine      Psych disturbance     Diphenhydramine Other (See Comments)     Doxycycline      Lansoprazole Diarrhea      Levaquin [Levofloxacin] Other (See Comments)     Headache, hyperactivity     Lisinopril Cough     Methotrexate      Sores     Morphine Itching     Morphine Sulfate Itching     Mycophenolate Diarrhea     Pravastatin Muscle Pain (Myalgia)     Simvastatin Muscle Pain (Myalgia)     severe     Cephalexin Rash     Fever and skin burning     Penicillin G Itching and Rash     Tolectin [Nsaids] Rash     Tramadol Rash            Physical Exam:   Vitals were reviewed.  All vitals stable.  Patient Vitals for the past 24 hrs:   BP Temp Temp src Pulse Resp SpO2   09/01/17 1031 154/60 - - 70 - -   09/01/17 0900 - 99.7  F (37.6  C) Oral - - -   09/01/17 0622 124/52 100.2  F (37.9  C) Oral 72 18 95 %   09/01/17 0415 149/65 99.9  F (37.7  C) Oral 81 18 95 %   09/01/17 0054 158/65 100  F (37.8  C) Oral 81 18 96 %   09/01/17 0000 - 100.8  F (38.2  C) Oral - - -   08/31/17 2215 146/61 100.8  F (38.2  C) Oral 75 18 98 %   08/31/17 1947 146/52 98.6  F (37  C) Oral - 16 98 %   08/31/17 1626 162/75 - - - - -   08/31/17 1504 165/67 98.1  F (36.7  C) Oral - 16 97 %   08/31/17 1320 168/71 - - - - -     Ranges for vital signs:  Temp:  [98.1  F (36.7  C)-100.8  F (38.2  C)] 99.7  F (37.6  C)  Pulse:  [70-81] 70  Heart Rate:  [64-74] 72  Resp:  [16-18] 18  BP: (124-168)/(52-75) 154/60  SpO2:  [95 %-98 %] 95 %  Vitals:    08/31/17 0717   Weight: 84.8 kg (187 lb)       Physical Examination:  GENERAL:  well-developed, well-nourished, in bed in no acute distress.  HEAD:  Head is normocephalic, atraumatic   EYES:  Eyes have anicteric sclerae without conjunctival injection   ABDOMEN:  Normal bowel sounds, soft, but a little tender above her bladder.   SKIN:  No acute rashes.  Three facial lesions but old.   NEUROLOGIC:  Grossly nonfocal. Active x4 extremities           Laboratory Data:     Absolute CD4   Date Value Ref Range Status   08/19/2014 415 mm3 Final   09/16/2013 1266 mm3 Final   09/15/2013 DUPLICATE,TESTING DONE ON SPECIMEN FROM 9.16.13 mm3  Final   11/13/2008 1332 mm3 Final       Inflammatory Markers    Recent Labs   Lab Test  09/01/17   0832  05/14/16   0659  05/13/16   0940  09/23/14   0810  08/19/14   1530  07/23/14   1119  06/30/14   0825  05/15/14   1112  05/08/14   0806   SED   --    --   67*  79*  76*   --   51*  58*  51*   CRP  51.0*  21.0*  19.0*  6.1  29.8*  13.7*  12.5*  8.0  <5.0       Immune Globulin Studies     Recent Labs   Lab Test  08/19/14   1530  05/21/12   0754   IGG  1220  1070   IGM   --   97   IGE  46   --    IGA   --   85   IGG1  684   --    IGG2  441   --    IGG3  70   --    IGG4  19   --        Metabolic Studies       Recent Labs   Lab Test  09/01/17   0832  08/31/17   1306   07/13/17   0843  05/10/17   0929   05/13/16   2037  05/13/16   1723  05/13/16   0940   07/23/14   1119   02/19/14   0700   NA  142  138   < >  139  138   < >   --   138  132*   < >  137   < >  141   POTASSIUM  3.4  3.9   < >  3.6  4.2   < >   --   3.9  3.6   < >  3.8   < >  4.7   CHLORIDE  110*  101   < >  102  102   < >   --   104  96   < >  98   < >  111*   CO2  27  32   < >  28  26   < >   --   25  26   < >  27   < >  21   ANIONGAP  6  4   < >  9  10   < >   --   8  10   < >  12   < >  8   BUN  17  22   < >  33*  36*   < >   --   25  29   < >  35*   < >  36*   CR  1.39*  1.17*   < >  1.30*  1.53*   < >   --   1.55*  1.64*   < >  1.70*   < >  1.48*   GFRESTIMATED  38*  46*   < >  41*  34*   < >   --   33*  31*   < >  30*   < >  36*   GLC  95  128*   < >  110*  104*   < >   --   89  98   < >  109*   < >  131*   KEILY  8.2*  8.7   < >  9.3  9.7   < >   --   8.4*  9.1   < >  9.1   < >  8.3*   PHOS   --    --    --    --   3.1   < >   --    --    --    < >   --    < >   --    MAG   --    --    --   2.4*  2.8*   < >   --    --   2.2   < >   --    < >  2.4*   LACT   --    --    --    --    --    --    --    --   0.8   --   1.7   --    --    PCAL   --    --    --    --    --    --    --   0.38   --    --   0.21   --    --    FGTL   --    --    --    --    --     --   <31  Unit: pg/mL     --    --    < >   --    --    --    CKT   --    --    --    --    --    --    --    --   78   --    --    --   56    < > = values in this interval not displayed.       Hepatic Studies    Recent Labs   Lab Test  08/31/17   1306  08/22/17   0905  07/13/17   0843   07/24/14   0814   09/15/13   1431   12/20/12   2110   BILITOTAL  0.3  0.3  0.3   < >  0.4   < >   --    < >  <0.1*   BILIDELTA   --    --    --    --   0.3   < >   --    < >  0.0   BILICONJ   --    --    --    --   0.0   < >   --    < >  0.0   DBIL   --    --   <0.1   < >   --    --    --    --    --    ALKPHOS  233*  241*  272*   < >  183*   < >   --    < >  93   PROTTOTAL  8.0  8.3  7.5   < >  6.6*   < >   --    < >  4.9*   ALBUMIN  3.1*  3.3*  3.1*   < >  3.4   < >   --    < >  2.4*   AST  31  49*  44   < >  92*   < >   --    < >  25   ALT  50  107*  105*   < >  140*   < >   --    < >  31   LDH   --    --    --    --    --    --   603   --   621    < > = values in this interval not displayed.       Pancreatitis testing    Recent Labs   Lab Test  08/22/17   0905   07/23/14   1119   02/18/14   0852   11/06/13   1246   08/13/13   1046   LIPASE   --    --   32   --   55   --   59   --   52   TRIG  447*   < >   --    < >   --    < >   --    < >   --     < > = values in this interval not displayed.       Gout Labs      Recent Labs   Lab Test  12/31/13   1443  07/30/13   1441   URIC  6.7  6.7       Hematology Studies      Recent Labs   Lab Test  09/01/17   0832  08/31/17   1306  07/13/17   0843  05/10/17   0929  04/11/17   1003  08/26/16   0944   WBC  9.4  10.6  8.7  9.2  8.9  8.9   ANEU  6.5  8.6*   --    --    --    --    ALYM  1.7  1.3   --    --    --    --    KATHY  1.0  0.7   --    --    --    --    AEOS  0.1  0.0   --    --    --    --    HGB  11.1*  12.4  11.3*  12.5  11.5*  11.5*   HCT  35.0  38.5  35.3  38.8  36.2  35.2   PLT  269  290  344  331  137  358       Clotting Studies    Recent Labs   Lab Test  05/16/16   0895   05/13/16   0940  11/06/13   1246  10/14/13   0904   INR  1.02  1.11  0.96  0.98   PTT   --   33   --    --        Iron Testing    Recent Labs   Lab Test  09/01/17   0832   07/11/13   0814   04/18/13   0809  03/21/13   0812   12/22/12   1346   12/20/12 2005 12/07/12   1345   IRON   --    --   58   --   63  90   < >   --    --    --   48   FEB   --    --   285   --   315  285   < >   --    --    --   156*   IRONSAT   --    --   20   --   20  32   < >   --    --    --   30   LAURA   --    --   195   < >  232  202   < >   --    --    --   317*   MCV  87   < >  88   < >  92  98   < >  100   < >  103*   --    B12   --    --    --    --    --    --    --    --    --    --   281   HAPT   --    --    --    --    --    --    --    --    --   137   --    RETP   --    --    --    --    --    --    --   2.7*   --    --    --    RETICABSCT   --    --    --    --    --    --    --   71.0   --    --    --     < > = values in this interval not displayed.       Markers    Alpha Fetoprotein   Date Value Ref Range Status   03/26/2012 4.1 0 - 8 ug/L Final     Comment:     Reference ranges apply to non-pregnant females only.       Autoimmune Testing    Recent Labs   Lab Test  05/13/16   1723  08/19/14   1530   PRANAV  <1.0  Interpretation:  Negative    <1.0  Interpretation:  Negative     RHF   --   <20       Thyroid Studies     Recent Labs   Lab Test  07/13/17   0843  05/10/17   0929  07/18/16   1430  05/24/16   0846  07/20/15   1010   09/23/14   0810   05/08/14   0806   TSH  3.66  4.74*  2.43  3.65  3.41   < >  2.87   < >  4.41   T4  1.59*  1.48*  1.38  1.19  1.24   < >  1.29  9.1   --   1.19  9.1   T3   --    --    --    --    --    --   65   --   48*    < > = values in this interval not displayed.       Urine Studies     Recent Labs   Lab Test  08/31/17   2100  08/22/17   0913  07/18/16   1503  05/13/16   1114  04/30/15   0951   08/19/14   1410   URINEPH  8.0*  7.0  5.5  6.5  6.0   < >  7.0   NITRITE  Negative  Negative  Negative   Negative  Negative   < >  Negative   LEUKEST  Small*  Trace*  Large*  Moderate*  Negative   < >  Negative   WBCU  2  2-5*  10-25*  4*   --    --   <1    < > = values in this interval not displayed.       Medication levels    Recent Labs   Lab Test  07/13/17   0843   09/15/13   0435   12/21/12   1344   VANCOMYCIN   --    --   26.3   --    --    RAPAMY  5.8   < >   --    < >   --    MPACID   --    --    --    --   5.42*   MPAG   --    --    --    --   83.5    < > = values in this interval not displayed.       Microbiology:  Cryptococcal antigen    Recent Labs   Lab Test  08/14/13   0822   CRPTT  Negative for cryptococcal antigen A negative cryptococcal antigen test does not exclude cryptococcal infection. If  a fungal culture is desired, it must be ordered separately.       Beta D Glucan levels (Fungitell assay)    Recent Labs   Lab Test  05/13/16   2037  08/19/14   1530   FGTL  <31  Unit: pg/mL    <31  Unit: pg/mL         Last Culture results with specimen source  Culture Micro   Date Value Ref Range Status   07/18/2016 (A)  Final    50,000 to 100,000 colonies/mL Klebsiella pneumoniae   05/21/2016 No growth  Final   05/21/2016 No growth  Final   05/16/2016 Canceled, Test credited Duplicate request  Final   05/16/2016 Canceled, Test credited Duplicate request  Final   05/16/2016 No growth  Final   05/16/2016 No growth  Final   05/13/2016 No growth after 29 days  Final   05/13/2016   Final    Canceled, Test credited Quantity not sufficient Notification of test cancellation   was given to MATTHEW FROM [facility], HE WILL REORDER AND RECOLLECT     05/13/2016 No growth  Final   05/13/2016 No growth  Final   05/13/2016   Final    10,000 to 50,000 colonies/mL mixed urogenital louann  Susceptibility testing not routinely done     05/13/2016 No growth  Final   09/25/2014 (A)  Final    Normal louann  Moderate growth Haemophilus influenzae Beta lactamase negative     09/03/2014 (A)  Final    Normal louann  Heavy growth Haemophilus  influenzae Beta lactamase negative  beta lactamase negative isolates are susceptible to ampicillin,   amoxacillin/clavulanic, levofloxacin and ceftriaxone     08/19/2014 No growth  Final   08/19/2014 No growth  Final   07/24/2014 No growth  Final   07/24/2014 No growth  Final    Specimen Description   Date Value Ref Range Status   07/18/2016 Midstream Urine  Final   05/21/2016 Blood Right Arm  Final   05/21/2016 Blood Left Arm  Final   05/16/2016 Blood  Final   05/16/2016 Blood  Final   05/16/2016 Blood Left Arm  Final   05/16/2016 Blood Right Arm  Final   05/13/2016 Blood Unspecified Site  Final   05/13/2016 Blood Unspecified Site  Final   05/13/2016 Blood Right Arm  Final   05/13/2016 Blood Right Arm  Final   05/13/2016 Midstream Urine  Final   05/13/2016 Nasal  Final   05/13/2016 Blood Venipuncture Collection VPT  Final   09/25/2014 Sputum  Final   09/25/2014 Sputum  Final   09/03/2014 Sputum  Final   09/03/2014 Sputum  Final   08/19/2014 Blood Right Arm  Final        Last check of C difficile  C Diff Toxin B PCR   Date Value Ref Range Status   05/17/2012   Final    Negative: Clostridium difficile target DNA sequences NOT detected, presumed   negative for Clostridium difficile toxin B or the number of bacteria present   may be below the limit of detection for the test.   FDA approved assay performed using Aula 7 GeneXpert real-time PCR.   A negative result does not exclude actual disease due to Clostridium difficile   and may be due to improper collection, handling and storage of the specimen or   the number of organisms in the specimen is below the detection limit of the   assay.       Infectious Disease Testing     Recent Labs   Lab Test  08/19/14   1529   TBRSLT  Negative       Virology:  EBV DNA Copies/mL   Date Value Ref Range Status   08/22/2017 EBV DNA Not Detected EBVNEG^EBV DNA Not Detected [Copies]/mL Final   04/11/2017 (A) EBVNEG [Copies]/mL Final    <500  EBV DNA Detected below the reportable range  of 500 Copies/mL     12/09/2016 1219 (A) EBVNEG [Copies]/mL Final   08/08/2016 85084 (A) EBVNEG [Copies]/mL Final   07/26/2016 36336 (A) EBVNEG [Copies]/mL Final   05/24/2016 45551 (A) EBVNEG [Copies]/mL Final       BK Virus Testing     BK Virus Result   Date Value Ref Range Status   07/28/2011 <1000 <1000 copies/mL Final       BK Virus Testing     Recent Labs   Lab Test  07/28/11   1150   BKSPEC  Urine   BKRES  <1000   BKLOG  <3.0 The lower limit of detection for this assay is 1000 copies/mL.  Real-time TaqMan  PCR was performed using BK primers and probe for the detection of a 90 bp  portion of the  1 gene.  The performance characteristics were validated by  the [facility], [place].  It has not been cleared or approved by the U.S. Food and Drug Administration.       Hepatitis B Testing     Recent Labs   Lab Test  05/13/16   1723  03/26/12   0856   AUSAB  0.09   --    HBSAB   --   0.0   HBCAB  Nonreactive  Negative   HEPBANG  Nonreactive  Negative        Hepatitis C Antibody   Date Value Ref Range Status   05/13/2016  NR Final    Nonreactive   Assay performance characteristics have not been established for newborns,   infants, and children     03/26/2012 Negative NEG Final       CMV Antibody IgG   Date Value Ref Range Status   05/13/2016  0.0 - 0.8 AI Final    <0.2  Negative   Antibody index (AI) values reflect qualitative changes in antibody   concentration that cannot be directly associated with clinical condition or   disease state.       CMV IgG Antibody   Date Value Ref Range Status   08/15/2013 <0.20  Negative for anti-CMV IgG U/mL Final     EBV Capsid Antibody IgG   Date Value Ref Range Status   05/13/2016 (H) 0.0 - 0.8 AI Final    >8.0  Positive, suggests recent or past exposure   Antibody index (AI) values reflect qualitative changes in antibody   concentration that cannot be directly associated with clinical condition or   disease state.       EBV VCA IgG Antibody   Date  Value Ref Range Status   08/15/2013 >750.00  Positive, suggests immunologic exposure. U/mL Final       Imaging:  Recent Results (from the past 48 hour(s))   CT Abdomen Pelvis w/o Contrast    Narrative    EXAMINATION: CT ABDOMEN PELVIS W/O CONTRAST, 8/31/2017 9:47 AM    TECHNIQUE:  Helical CT images from the lung bases through the pubic  symphysis were obtained  without IV contrast.     COMPARISON: PET CT dated 8/8/2017.    HISTORY: rule out free air, rectal perforation from colonoscopy    FINDINGS:  Three new surgical clips project within the rectum. There is a small  adjacent hyperdense collection along the right rectal wall, measuring  1.8 x 2.1 cm in maximum transverse dimensions, and 2.6 cm  craniocaudally, series 4 image 70. Small foci of adjacent free air  extending along, but not beyond the mesorectal fascia. Otherwise,  numerous colonic diverticula multiple containing retained contrast. No  bowel dilatation or appreciable wall thickening. Surgical clips in the  region of the proximal duodenum. No suspicious lymphadenopathy. The  bladder is well distended. The uterus and adnexa are within normal  limits for the patient's age. No free fluid.    Postsurgical changes of prior partial liver transplantation.  Small-volume pneumobilia. The spleen, and adrenal glands are  unremarkable on this noncontrast exam. Fatty pancreatic atrophy. No  hydronephrosis. No acute osseous abnormality. Multiple ventral  fat-containing hernias as well as calcification within the abdominal  wall and gluteal regions.    The previously seen lingular nodule has mildly increased in size,  currently measuring 15 x 16 mm in maximum transverse dimensions with  mild adjacent nodularity/atelectasis. Redemonstration of bibasilar  fibroatelectasis and calcified pleural plaques and nodules greatest in  the right lower lobe. Mild noncalcified nodularity in the right lower  lobe is similar to prior without definite acute superimposed process.       Impression    IMPRESSION:   1.  New surgical clips within the rectum with small adjacent hematoma  and small volume of air on the right extending to the mesorectal  fascia.  2.  Previously seen lingular nodule has mildly increased in size  compared to PET CT of 8/8/2017.  3.  The remainder of the exam is not significantly changed.    Findings were discussed with Dr. Montano on 8/31/2017 at 1015 am.    BRUNO LEMUS MD

## 2017-09-01 NOTE — PROGRESS NOTES
WOC Preoperative Ostomy    Diagnosis: Rectal perforation after colonoscopy   Date of Surgery: Potentially 9/2/17     Type of Surgery: loop ileostomy   Emotional readiness for surgery: no acute distress, however very hopeful surgery not needed and not receptive to much education today. Patient reports retired  and has seen ostomy pouches before.  Physical Limitations: Without limitations  Abdomen assessed for site by: Patient's ability to visiualize area, avoidance of skin creases and scars, palpating for rectus abdominus muscle and clothing fit  Teaching: intro to pouches, written/media offered and role of WOC/postop followup explained  Stoma site marking: Method used: marking pen and covered with Tegaderm film    Location chosen: [facility] [place]

## 2017-09-01 NOTE — CONSULTS
[facility]    Hepatology Consult    Requesting provider: Dr. Frost    Consult requested for rectal perforation after colonoscopy      HPI:  Mrs. Mckayla Thompson is a 67 year old woman with PMH of PBC s/p OLT liver transplant (2002) on prednisone and sirolimus, secondary sjogren's syndrome, h/o papillary thyroid cancer s/p thyroidectomy, hypothyroidism, HTN, HLD, CVA, CKD 3, chronic anemia, VRE who underwent screening colonoscopy complicated by rectal perforation.      Patient underwent a screening colonoscopy on 8/31/17 who had complication of rectal perforation, clippedx3 and admitted on on 8/31/17 for management.  On day of consultation the patient reports mild rectal discomfort.  She notes overnight fever but none at present.  Patient reports no abdominal pain, no bowel movements, nausea, vomiting.  She has flatus.   Negative ROS otherwise.    Per chart review:  Patient had 6 mm defect and was clipped successfully.  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 Gentry Ramirez, [facility]       Macular degeneration      Migraines 4/4/2012     Osteoarthritis of right  "knee 8/2/2012     Osteoporosis 4/20/2012     Paroxysmal a-fib (H) 6/13/2017     Post-surgical hypothyroidism      Postablative hypothyroidism 8/13/2012     Primary biliary cirrhosis (H)     s/p Liver transplant, [id number]     Sjogren's syndrome (H)      Statin intolerance      Unspecified cerebral artery occlusion with cerebral infarction 2001    when BP was very low, small multiple infacts in frontal lobe, had \"visual field cut,\" leg weakness, and expressive aphasia - all have resolved.      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      Past Surgical History:   Procedure Laterality Date     APPENDECTOMY  1961     BIOPSY       CATARACT IOL, RT/LT      RE12/19/2013, LE12/10/2013 - Toric lenses     CHOLECYSTECTOMY  1991     COLONOSCOPY       COLONOSCOPY  3/10/2014    Procedure: COLONOSCOPY;;  Surgeon: Gentry Ramirez MD;  Location: [facility]     ENDOSCOPIC RETROGRADE CHOLANGIOPANCREATOGRAM  9/19/2013    Procedure: ENDOSCOPIC RETROGRADE CHOLANGIOPANCREATOGRAM;  Endoscopic Retrograde Cholangiopancreatogram with single balloon enteroscopy, ballon sweep of bile duct;  Surgeon: Brett Membreno MD;  Location: [facility] OR     ENT SURGERY      ear drum repair     HC KNEE SCOPE,MED/LAT MENISECTOMY  8/10/12    Right, partial medial menisectomy only     KNEE SURGERY  1966    R knee     PICC INSERTION  9/18/2013    4fr SL PASV PICC, 40cm (1cm external) in the R basilic vein w/ tip in the low SVC     PICC INSERTION  2/21/2014    5 fr DL BioFlo Navilyst PICC, 46 cm (3 cm external) in the L basilic vein w/ tip in the SVC RA junction.     THYROIDECTOMY  3/2010     TRANSPLANT LIVER RECIPIENT LIVING UNRELATED            Medications  Current Facility-Administered Medications   Medication     meropenem (MERREM) 1 g vial to attach to  mL bag     HYDROmorphone (PF) (DILAUDID) injection 0.3-0.5 mg     naloxone (NARCAN) injection 0.1-0.4 mg     ezetimibe (ZETIA) tablet 10 mg     " levothyroxine (SYNTHROID/LEVOTHROID) tablet 150 mcg     predniSONE (DELTASONE) tablet 5 mg     SUMAtriptan (IMITREX) tablet 50 mg     ursodiol (ACTIGALL) tablet 250 mg     hydrALAZINE (APRESOLINE) half-tab 12.5 mg     [START ON 2017] levothyroxine (SYNTHROID/LEVOTHROID) tablet 225 mcg     0.9% sodium chloride infusion     ezetimibe (ZETIA) half-tab 5 mg     ondansetron (ZOFRAN-ODT) ODT tab 4 mg    Or     ondansetron (ZOFRAN) injection 4 mg     prochlorperazine (COMPAZINE) injection 5 mg    Or     prochlorperazine (COMPAZINE) tablet 5 mg    Or     prochlorperazine (COMPAZINE) Suppository 12.5 mg     metoprolol (LOPRESSOR) injection 5 mg     voriconazole (VFEND) 300 mg in NaCl 0.9 % 100 mL intermittent infusion       Allergies  Allergies   Allergen Reactions     Fluconazole Hives and Itching     Azithromycin Itching     Benadryl [Diphenhydramine Hcl]      Insomnia      Cefpodoxime Itching     Cellcept Diarrhea     Ciprofloxacin Other (See Comments)     Insomnia, mood lability     Codeine      Psych disturbance     Diphenhydramine Other (See Comments)     Doxycycline      Lansoprazole Diarrhea     Levaquin [Levofloxacin] Other (See Comments)     Headache, hyperactivity     Lisinopril Cough     Methotrexate      Sores     Morphine Itching     Morphine Sulfate Itching     Mycophenolate Diarrhea     Pravastatin Muscle Pain (Myalgia)     Simvastatin Muscle Pain (Myalgia)     severe     Cephalexin Rash     Fever and skin burning     Penicillin G Itching and Rash     Tolectin [Nsaids] Rash     Tramadol Rash       Family hx Social hx   Family History   Problem Relation Age of Onset     Hypertension Mother      Endometrial Cancer Mother      Hyperlipidemia Mother      Prostate Cancer Father      Macular Degeneration Father      Cancer - colorectal Maternal Grandmother      in her 80's, has surgery and removal of part of kidney,  at age 98     HEART DISEASE Maternal Grandfather       at 98     Glaucoma Maternal  "Grandfather      CEREBROVASCULAR DISEASE Paternal Grandmother      in her 80's     Hypertension Paternal Grandmother      HEART DISEASE Paternal Grandfather      MI     Alzheimer Disease Paternal Grandfather      Allergies Son      Neurologic Disorder Daughter      Migraines     Breast Cancer Other      Anesthesia Reaction No family hx of      Crohn Disease No family hx of      Ulcerative Colitis No family hx of       Social History   Substance Use Topics     Smoking status: Former Smoker     Packs/day: 1.00     Years: 18.00     Types: Cigarettes     Quit date: 4/12/1985     Smokeless tobacco: Never Used     Alcohol use 0.0 oz/week     0 Standard drinks or equivalent per week      Comment: rare - \"I toast at weddings\"          Review of systems  A 10-point review of systems was negative.    Social hx  No alcohol abuse  Remote smoking hx   to     Surgical hx see above.    Examination  /52 (BP Location: Right arm)  Pulse 72  Temp 100.2  F (37.9  C) (Oral)  Resp 18  Ht 1.715 m (5' 7.5\")  Wt 84.8 kg (187 lb)  SpO2 95%  BMI 28.86 kg/m2    Intake/Output Summary (Last 24 hours) at 09/01/17 0809  Last data filed at 09/01/17 0659   Gross per 24 hour   Intake           1922.5 ml   Output             1300 ml   Net            622.5 ml       Gen- well, NAD, A+Ox3, normal color  Eye- EOMI  ENT- MMM  Lym- no palpable LAD  CVS- RRR  RS- CTA  Abd-soft, non-tender to palpation, BS+    Extr- no DUTCH  Neuro- no asterixis  Skin- no rash  Psych- normal mood    Laboratory  Lab Results   Component Value Date     08/31/2017    POTASSIUM 3.9 08/31/2017    CHLORIDE 101 08/31/2017    CO2 32 08/31/2017    BUN 22 08/31/2017    CR 1.17 08/31/2017       Lab Results   Component Value Date    BILITOTAL 0.3 08/31/2017    ALT 50 08/31/2017    AST 31 08/31/2017    ALKPHOS 233 08/31/2017       Lab Results   Component Value Date    ALBUMIN 3.1 08/31/2017    PROTTOTAL 8.0 08/31/2017        Lab Results   Component Value Date "    WBC 10.6 08/31/2017    HGB 12.4 08/31/2017    MCV 86 08/31/2017     08/31/2017       Lab Results   Component Value Date    INR 1.02 05/16/2016       Radiology  8/31/17 colonoscopy   Impression:          - Preparation of the colon was fair.                        - Diverticulosis in the sigmoid colon.                        - No specimens collected.                        - Rectal perforation upon retroflexion treated                        successfully with 3 clips.     8/31/17  CT ab/pelvis:  There is a small adjacent hyperdense collection along the right rectal wall, measuring  1.8 x 2.1 cm in maximum transverse dimensions, and 2.6 cm  craniocaudally, series 4 image 70. Small foci of adjacent free air  extending along, but not beyond the mesorectal fascia.  IMPRESSION:   1.  New surgical clips within the rectum with small adjacent hematoma  and small volume of air on the right extending to the mesorectal  fascia.  2.  Previously seen lingular nodule has mildly increased in size  compared to PET CT of 8/8/2017.  3.  The remainder of the exam is not significantly changed.    Assessment  Mrs. Mckayla Thompson is a 67 year old woman with PMH of PBC s/p OLT liver transplant (2002) on prednisone and sirolimus, secondary sjogren's syndrome, h/o papillary thyroid cancer s/p thyroidectomy, hypothyroidism, HTN, HLD, CVA, CKD 3, chronic anemia, VRE who underwent screening colonoscopy complicated by rectal perforation.  Patient with mild fevers, no white count and CT scan showing small hematoma / fluid collection to 3 cm in greatest dimension.  Despite low grade fever, and borderline size fluid collection, hopeful patient can be managed conservatively with antibiotics and monitoring.      Recommendations  -concur with meropenem  -hold sirolimus given deleterious effect on wound healing  -okay to continue prednisone 5 mg daily  -may need to consider adding another immunosuppressive agent down the road (i.e.  Tacrolimus).  -defer to surgery on management of perforation        Curly Woods MD  Hepatology  ATTENDING NOTE, GASTROENTEROLOGY/HEPATOLOGY    I saw and discussed this patient with the fellow and participated in the decision making. I agree with the fellow's note. Eneida Montano MD

## 2017-09-01 NOTE — PROGRESS NOTES
"Surgery Cross-Cover Note  9/1/2017 12:22 AM     Serial abdomen exam:  Patient has been comfortable and doesn't complain of abdominal pain  Has been passing urine      /61 (BP Location: Right arm)  Pulse 75  Temp 100.8  F (38.2  C) (Oral)  Resp 18  Ht 1.715 m (5' 7.5\")  Wt 84.8 kg (187 lb)  SpO2 98%  BMI 28.86 kg/m2    I/O last 3 completed shifts:  In: 1122.5 [P.O.:120; I.V.:1002.5]  Out: 1000 [Urine:1000]    Exam:  Abd: soft, non tender, non distended    Spoke with Dr Church about T: 100.8, other vitals stable    Assessment/Plan:  Low grade post op fever, otherwise patient is stable   -to continue with current management    Claudette Morrell MD  PGY-1 General Surgery  Pager # [phone]    ===================================================================  Serial Abdomen Exam #2 at 2:00 am:    Patient is comfortable and has no active complains, fever has down trended as well  Abd: soft, non tender, non distended    ===================================================================    Serial Abdominal Exam # 3 at 4:30 am:    Patient is very comfortable, not febrile, no active complains    Abd: soft, non tender, not distended    Plan:   to continue with the current management    Claudette Morrell MD  General Surgery, PGY-1,   Pager: [phone]    ========================================================================  "

## 2017-09-01 NOTE — PROGRESS NOTES
Discussed patient with Dr. Bud Novoa yesterday, Dr. Neely and Dr. Montero. Met with Danette Orozco and resident yesterday. Also discussed with Dr. Jorge A Ramirez, patient's outpatient hepatologist (he agrees with holding sirolimus).   Talked with Dr. Sparrow today after CT today. Had low grade fever 100.8 last night. Feels ok.   CT reviewed.  I am still hopeful that she could not need surgical intervention.    Rec  1. Continue to hold sirolimus  2. Continue prednisone 5 mg  3. Check LFTs tomorrow

## 2017-09-01 NOTE — PROGRESS NOTES
Patient had an interval CT scan today;  I discussed results with radiologist; seems air around the rectum has decreased, but a collection has increased in size and has become more well formed.  Dr Calzada and I discussed the results with the patient and her spouse, explaining to her, her options:  1- follow the patient with serial abdominal exam, NPO and iv antibiotics and not rushing to go to the OR:  Vs.  2- taking the patient to the OR for a loop diverting ileostomy with EUA for possible drainage of the pelvis;    The risks and benefits of each of the approaches were explained and the patient and her spouse had questions that were answered;  We finally decided to watch her for today with NPO and iv abx and checking labs in the morning; if she continues to feel well then we will continue with this medical management;   However if patient develops any fevers, increased pain or if her labs get worse, we will take her to the operating room.    The patient and her spouse had full understanding of this plan and are happy with this management.    Raghu Neely MD  Colorectal surgery Fellow      COLORECTAL STAFF ADDENDUM  Patient seen and examined.   CT scan images reviewed. Collection slightly larger, no additional air extraluminal.  Fever overnight. Vitals otherwise stable. WBC normal.  No peritonitis.   Does have some tailbone pain.     Discussed at length with the patient and her .   As above, results of scan are equivocal but after fever, she has not spiked again.     Will plan to continue conservative treatment for now with NPO, IV antibiotic, and observation.  If additional hard signs will plan examination under anesthesia and diverting loop ileostomy.       Yuridia Frost MD  Colon and Rectal Surgery Attending  Department of Surgery  [facility]

## 2017-09-01 NOTE — PLAN OF CARE
Problem: Goal Outcome Summary  Goal: Goal Outcome Summary  Outcome: Improving  S/p colonoscopy with perforated bowel. Alert and oriented x4. BP hypertensive, improving with scheduled metoprolol. OVSS, on room air. Pt denies pain, states earlier IV Dilaudid and using heat pad has been very helpful. NPO + ice/sips with meds, denies nausea. PIV infusing MIVF, IV abx. Passing flatus, last BM yesterday. Scant rectal bleeding noted. Voiding spont, adequate UOP. UA/UC collected. UAL, steady on feet.  at bedside, attentive and supportive. Continue with POC.     Addendum: At 2215 Pt's temp 100.8 oral, OVSS-- Dr. Morrell notified. Pt states she does not feel feverish/ill like she normally does when she is running a fever. She does report a sore throat, and she is wondering if she may have picked up a cold from her grandchildren.

## 2017-09-01 NOTE — PLAN OF CARE
Problem: Goal Outcome Summary  Goal: Goal Outcome Summary  Outcome: No Change  Pt Tmax 100.8 vss temp down to 99.9 this am. Pt sleeping at this time. Denied any c/o's of pain. Stated the heating pad working to relieve her lower back discomfort. Lungs clear, abd round,+bs. IV fluids via piv @ 75/hr. Pt voiding good amts. Scant bld noted after voiding-few bld flecks.  IV antibiotics admin as ordered. Remains NPO. Cont to monitor temps closely. Keep NPO. Monitor for further s/s bleeding.    Addendum: Pt med with dilaudid 0.5mg iv at 0715 for c/o's low back pain. Pt stated she was fine all noc, then after getting up this am to clean up and use the BR she felt like she over did it. She also stated that this LBP started post colonoscopy. Pt resting comfortably at present time.

## 2017-09-01 NOTE — PLAN OF CARE
Problem: Goal Outcome Summary  Goal: Goal Outcome Summary  Outcome: No Change  Abdominal CT done this shift, NPO except for ice. WOC to diamond abdomen for possible ileostomy this weekend.Dilaudid given for low back pain with relief, voiding spont, ambulating in halls with sba.IV fluid rate 250cc till 1600, then decrease to 75cc.No rectal drainage noted this shift.

## 2017-09-01 NOTE — PROGRESS NOTES
"Colorectal Surgery Progress Note    Subjective: Temperature to 100.8 overnight. Serial abdominal exam benign. Pain controlled. Denies chills, CP, SOB, and N/V. Voiding. Passing some flatus, no BM.    Objective:   /52 (BP Location: Right arm)  Pulse 72  Temp 99.7  F (37.6  C) (Oral)  Resp 18  Ht 1.715 m (5' 7.5\")  Wt 84.8 kg (187 lb)  SpO2 95%  BMI 28.86 kg/m2    Gen: Awake, alert, NAD   CV: RRR  Resp: non-labored at rest  Abd: Soft, non-distended, NTTP  Ext: warm and well perfused    A/P: 67 yF with multiple comorbidities including immunosuppression for liver transplant, who developed a rectal perforation after colonoscopy on 8/30. The perforation was identified during scoping and was clipped. CT scan demonstrated small collection next to perforation as well as some air tracking but still beneath the peritoneum. Patient stable overnight but with low grade fever that self resolved. She is immunosuppressed and thus may mask typical signs/symptoms suggestive of worsening clinical status.    -Repeat CT scan today with PO and IV contrast. Will give patient additional IVF before and after the scan for Cr of 1.39. Discussed with radiology and they agreed that IV contrast was safe and worthwhile to assess fluid collection  -NPO  -Pain control  -Appreciate GI assistance for immunosuppression recommendations  -Appreciate ID assistance for abx guidance    Seen and discussed with colorectal fellow, who will discuss with staff.    Ha Baron MD  General Surgery PGY-3  Please page [phone] with any questions    "

## 2017-09-02 LAB
ALBUMIN SERPL-MCNC: 2.2 G/DL (ref 3.4–5)
ALP SERPL-CCNC: 174 U/L (ref 40–150)
ALT SERPL W P-5'-P-CCNC: 37 U/L (ref 0–50)
ANION GAP SERPL CALCULATED.3IONS-SCNC: 11 MMOL/L (ref 3–14)
AST SERPL W P-5'-P-CCNC: 26 U/L (ref 0–45)
BASOPHILS # BLD AUTO: 0 10E9/L (ref 0–0.2)
BASOPHILS NFR BLD AUTO: 0.3 %
BILIRUB DIRECT SERPL-MCNC: <0.1 MG/DL (ref 0–0.2)
BILIRUB SERPL-MCNC: 0.3 MG/DL (ref 0.2–1.3)
BUN SERPL-MCNC: 14 MG/DL (ref 7–30)
CALCIUM SERPL-MCNC: 7.7 MG/DL (ref 8.5–10.1)
CHLORIDE SERPL-SCNC: 110 MMOL/L (ref 94–109)
CO2 SERPL-SCNC: 20 MMOL/L (ref 20–32)
CREAT SERPL-MCNC: 1.14 MG/DL (ref 0.52–1.04)
CRP SERPL-MCNC: 71 MG/L (ref 0–8)
DIFFERENTIAL METHOD BLD: ABNORMAL
EOSINOPHIL # BLD AUTO: 0.2 10E9/L (ref 0–0.7)
EOSINOPHIL NFR BLD AUTO: 1.7 %
ERYTHROCYTE [DISTWIDTH] IN BLOOD BY AUTOMATED COUNT: 17.1 % (ref 10–15)
GFR SERPL CREATININE-BSD FRML MDRD: 47 ML/MIN/1.7M2
GLUCOSE BLDC GLUCOMTR-MCNC: 74 MG/DL (ref 70–99)
GLUCOSE SERPL-MCNC: 49 MG/DL (ref 70–99)
HCT VFR BLD AUTO: 34 % (ref 35–47)
HGB BLD-MCNC: 10.5 G/DL (ref 11.7–15.7)
IMM GRANULOCYTES # BLD: 0 10E9/L (ref 0–0.4)
IMM GRANULOCYTES NFR BLD: 0.3 %
LYMPHOCYTES # BLD AUTO: 1.7 10E9/L (ref 0.8–5.3)
LYMPHOCYTES NFR BLD AUTO: 16.6 %
MCH RBC QN AUTO: 27.4 PG (ref 26.5–33)
MCHC RBC AUTO-ENTMCNC: 30.9 G/DL (ref 31.5–36.5)
MCV RBC AUTO: 89 FL (ref 78–100)
MONOCYTES # BLD AUTO: 0.9 10E9/L (ref 0–1.3)
MONOCYTES NFR BLD AUTO: 8.9 %
NEUTROPHILS # BLD AUTO: 7.4 10E9/L (ref 1.6–8.3)
NEUTROPHILS NFR BLD AUTO: 72.2 %
NRBC # BLD AUTO: 0 10*3/UL
NRBC BLD AUTO-RTO: 0 /100
PLATELET # BLD AUTO: 259 10E9/L (ref 150–450)
POTASSIUM SERPL-SCNC: 3.8 MMOL/L (ref 3.4–5.3)
PROT SERPL-MCNC: 6.2 G/DL (ref 6.8–8.8)
RBC # BLD AUTO: 3.83 10E12/L (ref 3.8–5.2)
SODIUM SERPL-SCNC: 142 MMOL/L (ref 133–144)
WBC # BLD AUTO: 10.2 10E9/L (ref 4–11)

## 2017-09-02 PROCEDURE — A9270 NON-COVERED ITEM OR SERVICE: HCPCS | Mod: GY | Performed by: PHYSICIAN ASSISTANT

## 2017-09-02 PROCEDURE — 25000125 ZZHC RX 250: Performed by: PHYSICIAN ASSISTANT

## 2017-09-02 PROCEDURE — 80076 HEPATIC FUNCTION PANEL: CPT | Performed by: PHYSICIAN ASSISTANT

## 2017-09-02 PROCEDURE — 25000128 H RX IP 250 OP 636: Performed by: PHYSICIAN ASSISTANT

## 2017-09-02 PROCEDURE — 00000146 ZZHCL STATISTIC GLUCOSE BY METER IP

## 2017-09-02 PROCEDURE — 25000128 H RX IP 250 OP 636: Performed by: INTERNAL MEDICINE

## 2017-09-02 PROCEDURE — 25000132 ZZH RX MED GY IP 250 OP 250 PS 637: Mod: GY | Performed by: PHYSICIAN ASSISTANT

## 2017-09-02 PROCEDURE — 25000128 H RX IP 250 OP 636: Performed by: STUDENT IN AN ORGANIZED HEALTH CARE EDUCATION/TRAINING PROGRAM

## 2017-09-02 PROCEDURE — 85025 COMPLETE CBC W/AUTO DIFF WBC: CPT | Performed by: PHYSICIAN ASSISTANT

## 2017-09-02 PROCEDURE — 80048 BASIC METABOLIC PNL TOTAL CA: CPT | Performed by: PHYSICIAN ASSISTANT

## 2017-09-02 PROCEDURE — 36415 COLL VENOUS BLD VENIPUNCTURE: CPT | Performed by: PHYSICIAN ASSISTANT

## 2017-09-02 PROCEDURE — 86140 C-REACTIVE PROTEIN: CPT | Performed by: PHYSICIAN ASSISTANT

## 2017-09-02 PROCEDURE — 80076 HEPATIC FUNCTION PANEL: CPT | Performed by: STUDENT IN AN ORGANIZED HEALTH CARE EDUCATION/TRAINING PROGRAM

## 2017-09-02 PROCEDURE — 25800025 ZZH RX 258: Performed by: STUDENT IN AN ORGANIZED HEALTH CARE EDUCATION/TRAINING PROGRAM

## 2017-09-02 PROCEDURE — 12000001 ZZH R&B MED SURG/OB UMMC

## 2017-09-02 RX ORDER — DEXTROSE MONOHYDRATE, SODIUM CHLORIDE, AND POTASSIUM CHLORIDE 50; .745; 4.5 G/1000ML; G/1000ML; G/1000ML
INJECTION, SOLUTION INTRAVENOUS CONTINUOUS
Status: DISCONTINUED | OUTPATIENT
Start: 2017-09-02 | End: 2017-09-04

## 2017-09-02 RX ADMIN — SODIUM CHLORIDE: 9 INJECTION, SOLUTION INTRAVENOUS at 05:04

## 2017-09-02 RX ADMIN — MEROPENEM 1 G: 1 INJECTION, POWDER, FOR SOLUTION INTRAVENOUS at 05:05

## 2017-09-02 RX ADMIN — PREDNISONE 5 MG: 5 TABLET ORAL at 08:46

## 2017-09-02 RX ADMIN — METOPROLOL TARTRATE 5 MG: 5 INJECTION INTRAVENOUS at 05:05

## 2017-09-02 RX ADMIN — SODIUM CHLORIDE 300 MG: 9 INJECTION, SOLUTION INTRAVENOUS at 07:18

## 2017-09-02 RX ADMIN — METOPROLOL TARTRATE 5 MG: 5 INJECTION INTRAVENOUS at 23:57

## 2017-09-02 RX ADMIN — URSODIOL 250 MG: 250 TABLET ORAL at 17:00

## 2017-09-02 RX ADMIN — HYDROMORPHONE HYDROCHLORIDE 0.5 MG: 1 INJECTION, SOLUTION INTRAMUSCULAR; INTRAVENOUS; SUBCUTANEOUS at 01:22

## 2017-09-02 RX ADMIN — MEROPENEM 1 G: 1 INJECTION, POWDER, FOR SOLUTION INTRAVENOUS at 23:57

## 2017-09-02 RX ADMIN — URSODIOL 250 MG: 250 TABLET ORAL at 08:46

## 2017-09-02 RX ADMIN — METOPROLOL TARTRATE 5 MG: 5 INJECTION INTRAVENOUS at 16:59

## 2017-09-02 RX ADMIN — METOPROLOL TARTRATE 5 MG: 5 INJECTION INTRAVENOUS at 11:04

## 2017-09-02 RX ADMIN — HYDROMORPHONE HYDROCHLORIDE 0.5 MG: 1 INJECTION, SOLUTION INTRAMUSCULAR; INTRAVENOUS; SUBCUTANEOUS at 08:54

## 2017-09-02 RX ADMIN — Medication 10 MEQ: at 09:45

## 2017-09-02 RX ADMIN — Medication 12.5 MG: at 08:46

## 2017-09-02 RX ADMIN — Medication 10 MEQ: at 13:15

## 2017-09-02 RX ADMIN — MEROPENEM 1 G: 1 INJECTION, POWDER, FOR SOLUTION INTRAVENOUS at 17:00

## 2017-09-02 RX ADMIN — SODIUM CHLORIDE 300 MG: 9 INJECTION, SOLUTION INTRAVENOUS at 20:19

## 2017-09-02 RX ADMIN — EZETIMIBE 10 MG: 10 TABLET ORAL at 20:22

## 2017-09-02 RX ADMIN — LEVOTHYROXINE SODIUM 150 MCG: 150 TABLET ORAL at 08:54

## 2017-09-02 RX ADMIN — MEROPENEM 1 G: 1 INJECTION, POWDER, FOR SOLUTION INTRAVENOUS at 11:07

## 2017-09-02 ASSESSMENT — PAIN DESCRIPTION - DESCRIPTORS
DESCRIPTORS: DULL
DESCRIPTORS: ACHING
DESCRIPTORS: DULL
DESCRIPTORS: RADIATING

## 2017-09-02 NOTE — PLAN OF CARE
Problem: Goal Outcome Summary  Goal: Goal Outcome Summary  Outcome: No Change  VSS, afebrile.  NPO x ice/meds.  IVF @ 75cc/hr.  Up ad ashley in room. Unchanged persistent mild -moderate rectal pain at all times, Aqua-K pad with improvement.  Increased pain with out of bed activity, IV dilaudid given x1.  Voiding spontaneously, no BM.  Scant serosanguinous rectal drainage present.  Showered, tolerated well.  Potassium replaced, redraw scheduled in AM.  Continue to monitor.    Of note, writer was notified by lab that AM glucose was 49.  Pt was asymptomatic.  Dr Sparrow was notified, changed IV fluids from NS to one containing dextrose.  No instruction given to follow-up glucose with POC testing.

## 2017-09-02 NOTE — PLAN OF CARE
Problem: Goal Outcome Summary  Goal: Goal Outcome Summary  Outcome: No Change  AVSS.  97% RA.  Dilaudid x1 for rectal pain - with relief.  Aqua-K pad also with relief to rectal area.  Pain increases with movement.  Up with SBA to BR to void, good vols.  Small amt rectal drainage, smear.  PIV infusing.  ATBX as ordered.  NPO x ice chips, denies nausea.    WOC marked possible ostomy site.  Cont with POC.

## 2017-09-02 NOTE — PLAN OF CARE
Problem: Goal Outcome Summary  Goal: Goal Outcome Summary  Outcome: No Change  VSS. No need for pain intervention this evening. NPO with ice chips for comfort. Denies nausea. WOC saw pt to diamond for possible ostomy. Voiding spont. Up SBA in halls. Pt resting comfortably. Continue POC.

## 2017-09-02 NOTE — PROGRESS NOTES
"      GASTROENTEROLOGY PROGRESS NOTE        ASSESSMENT/ RECOMMENDATIONS:  67 year old woman with PBC s/p OLT liver transplant (2002) on prednisone and sirolimus, Sjogren's syndrome,  who underwent screening colonoscopy complicated by rectal perforation on 8/31/2017.  Patient with mild fevers initially, no white count and CT scan showing small hematoma / fluid collection to 3 cm in greatest dimension. No more low grade fevers.      Recommendations    Continue with antibiotics per primary.     Hold sirolimus given deleterious effect on wound healing, okay to continue prednisone 5 mg daily, may need to consider adding another immunosuppressive agent down the road (i.e. Tacrolimus).    Daily CBC, BMP.     Defer to surgery on management of perforation, serial abdominal exams.       The patient was discussed and plan agreed upon with GI staff.    Mohan Rodriguez  [facility]  Gastroenterology Fellow  _______________________________________________________________    S: denied any abdominal pain, still have some rectal pain, no fever, chills.     O:  Blood pressure 146/61, pulse 74, temperature 97.7  F (36.5  C), temperature source Oral, resp. rate 16, height 1.715 m (5' 7.5\"), weight 87 kg (191 lb 12.8 oz), SpO2 97 %.    Gen: no distress  CV: normal S1, S2  Lungs: CTAB  Abd: soft and non tender.       LABS:  BMP  Recent Labs  Lab 09/02/17  0648 09/01/17  0832 08/31/17  1306    142 138   POTASSIUM 3.8 3.4 3.9   CHLORIDE 110* 110* 101   KEILY 7.7* 8.2* 8.7   CO2 20 27 32   BUN 14 17 22   CR 1.14* 1.39* 1.17*   GLC 49* 95 128*     CBC  Recent Labs  Lab 09/02/17  0648 09/01/17  0832 08/31/17  1306   WBC 10.2 9.4 10.6   RBC 3.83 4.04 4.50   HGB 10.5* 11.1* 12.4   HCT 34.0* 35.0 38.5   MCV 89 87 86   MCH 27.4 27.5 27.6   MCHC 30.9* 31.7 32.2   RDW 17.1* 16.8* 16.4*    269 290     INRNo lab results found in last 7 days.  LFTs  Recent Labs  Lab 09/02/17  0648 08/31/17  1306   ALKPHOS 174* 233* "   AST 26 31   ALT 37 50   BILITOTAL 0.3 0.3   PROTTOTAL 6.2* 8.0   ALBUMIN 2.2* 3.1*      PANCNo lab results found in last 7 days.      ATTENDING NOTE, GASTROENTEROLOGY/HEPATOLOGY    I saw and discussed this patient with the fellow and participated in the decision making. I agree with the fellow's note. Discussed with the patient and with Faviola Huston and Andrews regarding restarting rapamune vs some other IS. Will consider Monday or Tuesday if she is eating and clinically turned the corner. Will discuss with her outpatient hepatologist Dr. Ramirez. Eneida Montano MD

## 2017-09-02 NOTE — PLAN OF CARE
Problem: Individualization  Goal: Patient Preferences  Outcome: Improving  RN [id number]:  Afebrile; other VSS on RA. Patient c/o slight rectal pain but declined medication intervention; heating pad used with some relief. Denies nausea; remains NPO with ice chips. Voiding spontaneously without difficulty and no BM. D5 1/2 NS +KCl infusing at 75 mL/hr via right PIV. Up independently in room and halls. Friend at bedside; very supportive and encouraging. Will continue to monitor and treat per plan of care.

## 2017-09-02 NOTE — PROGRESS NOTES
[facility]  Transplant Infectious Disease Progress Note      Patient:  Luz Thompson, Date of birth 1950, Medical record number [id number]  Date of Visit:  09/02/2017         Assessment and Recommendations:   Recommendations:  - continue meropenem.   - continue IV voriconazole while NPO, may resume PO at same dose prior to admission once cleared for PO intake.       Assessment:  Virginia is a 67 year old woman immunosuppressed s/p 2002 liver transplant with recurrent gram negative sepsis. She has known sigmoid diverticulosis. She gets episodes of defecation syncope.  Infectious Disease issues include:  - Rectal perforation with fever after colonoscopy 8/31/2017. Eloquis and rapamune stopped to assist with wound healing. Bronchoscopy postponed so that any interior pressure created by coughing does not open perf any further. Given many allergies, she was started on meropenem to cover aerobes and anaerobes.  The fever now resolved. It was likely due to perforated viscus that may have resulted in transient bacteremia due to translocation of colonic bacteria or due to local inflammatory process without infection resulted from the perforation and the local hematoma.   - Pulmonary Coccidiodes infection. Followup CT imaging shows just a little increased uptake in lung nodule on PET imaging. Bronchoscopy deferred due to rectal perforation. Will continue voriconazole IV and switch to PO once cleared for PO intake.     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  - Self-triggered use of prn antibiotics due to recurrent episodes of sepsis and cholangitis with bactrim. If not responding she usually presents to ED for ertapenem.   - Hx of bacterial superinfection of chronic venous stasis changes on the legs, 7/27/2016.  - Hx of recurrent E coli sepsis 8/13/2013, 6/10/2015.   - Hx of Klebsiella UTI, 7/18/16. She completed a 14-day course  of macrobid.  - Hx of Haemophilus influenzae pneumonia in 9/2014.  - Hx of angular chelitis, hx of thrush extending from under her upper denture plate, and onychomycosis.   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   - PCP prophylaxis: Not needed, as most recent CD4 count was > 200.   - Serostatus: CMV seronegative, EBV seropositive on 8/15/13.  - Immunization status: Completed PCV13 and PPSV23 x2 with last dose in 5/2016.  - Gamma globulin status: Endogenously replete.  - Isolation status: Good hand hygience. When she is an inpatient, she needs to be in contact isolation for known VRE carriage.    Branden Meraz   Pager: [phone]  9/2/2017            Interim History:   She is experiencing fresh blood per rectum, as little as smudges on toilet paper and panties today.    Afebrile.   Feels overall better and more energetic.   No abdominal complaints, no N/V.         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 She has venous stasis changes of her legs, and she can get stasis dermatitis if she does not wear her compression stockings. This is especially problematic if the weather is very warm and for that reason she does not want to wear compression stockings. She received Rituxan therapy for EBV viremia on 8/31/2016. She had some side effects in the days following the dose of Rituxan, but otherwise would be able to tolerate it again in the future. In the 2709-9299 winter season, she was diagnosed with coccidioidomycosis. There was a strong pulmonary component to the infection, and she is being treated with voriconazole. She had a 7/31/2017 visit with pulmonary. There is a lung nodule in the lingula, and a slightly enlarging rounded nodular opacity. Differential includes developing granuloma from resolved infection, new or superimposed infection, malignancy. She then had a PET/CT which shows that the  nodule is avid, so there is a plan to pursue a percutaneous lung biopsy shortly.       Since Virginia was last seen by me in clinic on 8/16/2017, she was in the procedure area today for colonscopy and bronchoscopy. There was a rectal perforation. CT imaging showed some air. I was called and we started meropenem. Pain is now finally controlled, she has been admitted. Overnight plan is to CT in 24-48 hours, and then based on that decide whether to proceed with surgery. It will take 3-5 days for the hole to self-seal in most patients. Rapa and Eloquis were d/c to assist in the healing process.    Transplants:  5/22/2002 (Liver), Postoperative day:  5582.  Coordinator Angelika Luo    Review of Systems:  As mentioned in the interim history otherwise negative by reviewing central and peripheral neurological systems, respiratory system, cardiac system, GI system,  system, musculoskeletal, skin, allergy, and lymphatics.            Current Medications & Allergies:       meropenem  1 g Intravenous Q6H     ezetimibe  10 mg Oral Every Other Day     levothyroxine  150 mcg Oral Once per day on Sun Tue Wed Thu Fri Sat     predniSONE  5 mg Oral Daily     ursodiol  250 mg Oral BID     hydrALAZINE  12.5 mg Oral Daily     [START ON 9/4/2017] levothyroxine  225 mcg Oral Q7 Days     ezetimibe  5 mg Oral Every Other Day     metoprolol  5 mg Intravenous Q6H     voriconazole  4 mg/kg Intravenous Q12H       Infusions/Drips:    dextrose 5% and 0.45% NaCl + KCl 10 mEq/L Stopped (09/02/17 0945)       Allergies   Allergen Reactions     Fluconazole Hives and Itching     Azithromycin Itching     Benadryl [Diphenhydramine Hcl]      Insomnia      Cefpodoxime Itching     Cellcept Diarrhea     Ciprofloxacin Other (See Comments)     Insomnia, mood lability     Codeine      Psych disturbance     Diphenhydramine Other (See Comments)     Doxycycline      Lansoprazole Diarrhea     Levaquin [Levofloxacin] Other (See Comments)     Headache,  hyperactivity     Lisinopril Cough     Methotrexate      Sores     Morphine Itching     Morphine Sulfate Itching     Mycophenolate Diarrhea     Pravastatin Muscle Pain (Myalgia)     Simvastatin Muscle Pain (Myalgia)     severe     Cephalexin Rash     Fever and skin burning     Penicillin G Itching and Rash     Tolectin [Nsaids] Rash     Tramadol Rash            Physical Exam:   Vitals were reviewed.  All vitals stable.  Patient Vitals for the past 24 hrs:   BP Temp Temp src Pulse Resp SpO2 Weight   09/02/17 1256 136/50 98.9  F (37.2  C) Oral - 18 97 % -   09/02/17 1100 121/51 - - - - - 87 kg (191 lb 12.8 oz)   09/02/17 0721 142/57 98.1  F (36.7  C) Oral 73 16 98 % -   09/02/17 0453 154/60 97.7  F (36.5  C) Oral - 16 98 % -   09/01/17 2230 157/63 99.4  F (37.4  C) Oral 77 16 97 % -   09/01/17 2228 - 100.1  F (37.8  C) Oral - - - -   09/01/17 1547 156/60 99.6  F (37.6  C) Oral 73 18 96 % -     Ranges for vital signs:  Temp:  [97.7  F (36.5  C)-100.1  F (37.8  C)] 98.9  F (37.2  C)  Pulse:  [73-77] 73  Heart Rate:  [72-73] 73  Resp:  [16-18] 18  BP: (121-157)/(50-63) 136/50  SpO2:  [96 %-98 %] 97 %  Vitals:    08/31/17 0717 09/02/17 1100   Weight: 84.8 kg (187 lb) 87 kg (191 lb 12.8 oz)       Physical Examination:  GENERAL:  well-developed, well-nourished, in bed in no acute distress.           Laboratory Data:     Absolute CD4   Date Value Ref Range Status   08/19/2014 415 mm3 Final   09/16/2013 1266 mm3 Final   09/15/2013 DUPLICATE,TESTING DONE ON SPECIMEN FROM 9.16.13 mm3 Final   11/13/2008 1332 mm3 Final       Inflammatory Markers    Recent Labs   Lab Test  09/02/17   0648  09/01/17   0832  05/14/16   0659  05/13/16   0940  09/23/14   0810  08/19/14   1530   06/30/14   0825  05/15/14   1112  05/08/14   0806   SED   --    --    --   67*  79*  76*   --   51*  58*  51*   CRP  71.0*  51.0*  21.0*  19.0*  6.1  29.8*   < >  12.5*  8.0  <5.0    < > = values in this interval not displayed.       Immune Globulin Studies      Recent Labs   Lab Test  08/19/14   1530  05/21/12   0754   IGG  1220  1070   IGM   --   97   IGE  46   --    IGA   --   85   IGG1  684   --    IGG2  441   --    IGG3  70   --    IGG4  19   --        Metabolic Studies       Recent Labs   Lab Test  09/02/17   0648  09/01/17   0832   07/13/17   0843  05/10/17   0929   05/13/16   2037  05/13/16   1723  05/13/16   0940   07/23/14   1119   02/19/14   0700   NA  142  142   < >  139  138   < >   --   138  132*   < >  137   < >  141   POTASSIUM  3.8  3.4   < >  3.6  4.2   < >   --   3.9  3.6   < >  3.8   < >  4.7   CHLORIDE  110*  110*   < >  102  102   < >   --   104  96   < >  98   < >  111*   CO2  20  27   < >  28  26   < >   --   25  26   < >  27   < >  21   ANIONGAP  11  6   < >  9  10   < >   --   8  10   < >  12   < >  8   BUN  14  17   < >  33*  36*   < >   --   25  29   < >  35*   < >  36*   CR  1.14*  1.39*   < >  1.30*  1.53*   < >   --   1.55*  1.64*   < >  1.70*   < >  1.48*   GFRESTIMATED  47*  38*   < >  41*  34*   < >   --   33*  31*   < >  30*   < >  36*   GLC  49*  95   < >  110*  104*   < >   --   89  98   < >  109*   < >  131*   KEILY  7.7*  8.2*   < >  9.3  9.7   < >   --   8.4*  9.1   < >  9.1   < >  8.3*   PHOS   --    --    --    --   3.1   < >   --    --    --    < >   --    < >   --    MAG   --    --    --   2.4*  2.8*   < >   --    --   2.2   < >   --    < >  2.4*   LACT   --    --    --    --    --    --    --    --   0.8   --   1.7   --    --    PCAL   --    --    --    --    --    --    --   0.38   --    --   0.21   --    --    FGTL   --    --    --    --    --    --   <31  Unit: pg/mL     --    --    < >   --    --    --    CKT   --    --    --    --    --    --    --    --   78   --    --    --   56    < > = values in this interval not displayed.       Hepatic Studies    Recent Labs   Lab Test  09/02/17   0648  08/31/17   1306  08/22/17   0905   07/24/14   0814   09/15/13   1431   12/20/12   2110   BILITOTAL  0.3  0.3  0.3   < >  0.4   < >    --    < >  <0.1*   BILIDELTA   --    --    --    --   0.3   < >   --    < >  0.0   BILICONJ   --    --    --    --   0.0   < >   --    < >  0.0   DBIL  <0.1   --    --    < >   --    --    --    --    --    ALKPHOS  174*  233*  241*   < >  183*   < >   --    < >  93   PROTTOTAL  6.2*  8.0  8.3   < >  6.6*   < >   --    < >  4.9*   ALBUMIN  2.2*  3.1*  3.3*   < >  3.4   < >   --    < >  2.4*   AST  26  31  49*   < >  92*   < >   --    < >  25   ALT  37  50  107*   < >  140*   < >   --    < >  31   LDH   --    --    --    --    --    --   603   --   621    < > = values in this interval not displayed.       Pancreatitis testing    Recent Labs   Lab Test  08/22/17   0905   07/23/14   1119   02/18/14   0852   11/06/13   1246   08/13/13   1046   LIPASE   --    --   32   --   55   --   59   --   52   TRIG  447*   < >   --    < >   --    < >   --    < >   --     < > = values in this interval not displayed.       Gout Labs      Recent Labs   Lab Test  12/31/13   1443  07/30/13   1441   URIC  6.7  6.7       Hematology Studies      Recent Labs   Lab Test  09/02/17   0648  09/01/17   0832  08/31/17   1306  07/13/17   0843  05/10/17   0929  04/11/17   1003   WBC  10.2  9.4  10.6  8.7  9.2  8.9   ANEU  7.4  6.5  8.6*   --    --    --    ALYM  1.7  1.7  1.3   --    --    --    KATHY  0.9  1.0  0.7   --    --    --    AEOS  0.2  0.1  0.0   --    --    --    HGB  10.5*  11.1*  12.4  11.3*  12.5  11.5*   HCT  34.0*  35.0  38.5  35.3  38.8  36.2   PLT  259  269  290  344  331  347       Clotting Studies    Recent Labs   Lab Test  05/16/16   0827  05/13/16   0940  11/06/13   1246  10/14/13   0904   INR  1.02  1.11  0.96  0.98   PTT   --   33   --    --        Iron Testing    Recent Labs   Lab Test  09/02/17   0648   07/11/13   0814   04/18/13   0809  03/21/13   0812 12/22/12 1346 12/20/12 2005 12/07/12   1345   IRON   --    --   58   --   63  90   < >   --    --    --   48   FEB   --    --   285   --   315  285   < >   --    --     --   156*   IRONSAT   --    --   20   --   20  32   < >   --    --    --   30   LAURA   --    --   195   < >  232  202   < >   --    --    --   317*   MCV  89   < >  88   < >  92  98   < >  100   < >  103*   --    B12   --    --    --    --    --    --    --    --    --    --   281   HAPT   --    --    --    --    --    --    --    --    --   137   --    RETP   --    --    --    --    --    --    --   2.7*   --    --    --    RETICABSCT   --    --    --    --    --    --    --   71.0   --    --    --     < > = values in this interval not displayed.       Markers    Alpha Fetoprotein   Date Value Ref Range Status   03/26/2012 4.1 0 - 8 ug/L Final     Comment:     Reference ranges apply to non-pregnant females only.       Autoimmune Testing    Recent Labs   Lab Test  05/13/16   1723  08/19/14   1530   PRANAV  <1.0  Interpretation:  Negative    <1.0  Interpretation:  Negative     RHF   --   <20       Thyroid Studies     Recent Labs   Lab Test  07/13/17   0843  05/10/17   0929  07/18/16   1430  05/24/16   0846  07/20/15   1010   09/23/14   0810   05/08/14   0806   TSH  3.66  4.74*  2.43  3.65  3.41   < >  2.87   < >  4.41   T4  1.59*  1.48*  1.38  1.19  1.24   < >  1.29  9.1   --   1.19  9.1   T3   --    --    --    --    --    --   65   --   48*    < > = values in this interval not displayed.       Urine Studies     Recent Labs   Lab Test  08/31/17   2100  08/22/17   0913  07/18/16   1503  05/13/16   1114  04/30/15   0951   08/19/14   1410   URINEPH  8.0*  7.0  5.5  6.5  6.0   < >  7.0   NITRITE  Negative  Negative  Negative  Negative  Negative   < >  Negative   LEUKEST  Small*  Trace*  Large*  Moderate*  Negative   < >  Negative   WBCU  2  2-5*  10-25*  4*   --    --   <1    < > = values in this interval not displayed.       Medication levels    Recent Labs   Lab Test  07/13/17   0843   09/15/13   0435   12/21/12   1344   VANCOMYCIN   --    --   26.3   --    --    RAPAMY  5.8   < >   --    < >   --    MPACID   --     --    --    --   5.42*   MPAG   --    --    --    --   83.5    < > = values in this interval not displayed.       Microbiology:  Cryptococcal antigen    Recent Labs   Lab Test  08/14/13   0822   CRPTT  Negative for cryptococcal antigen A negative cryptococcal antigen test does not exclude cryptococcal infection. If  a fungal culture is desired, it must be ordered separately.       Beta D Glucan levels (Fungitell assay)    Recent Labs   Lab Test  05/13/16   2037  08/19/14   1530   FGTL  <31  Unit: pg/mL    <31  Unit: pg/mL         Last Culture results with specimen source  Culture Micro   Date Value Ref Range Status   07/18/2016 (A)  Final    50,000 to 100,000 colonies/mL Klebsiella pneumoniae   05/21/2016 No growth  Final   05/21/2016 No growth  Final   05/16/2016 Canceled, Test credited Duplicate request  Final   05/16/2016 Canceled, Test credited Duplicate request  Final   05/16/2016 No growth  Final   05/16/2016 No growth  Final   05/13/2016 No growth after 29 days  Final   05/13/2016   Final    Canceled, Test credited Quantity not sufficient Notification of test cancellation   was given to [facility]THEW FROM [facility], HE WILL REORDER AND RECOLLECT     05/13/2016 No growth  Final   05/13/2016 No growth  Final   05/13/2016   Final    10,000 to 50,000 colonies/mL mixed urogenital louann  Susceptibility testing not routinely done     05/13/2016 No growth  Final   09/25/2014 (A)  Final    Normal louann  Moderate growth Haemophilus influenzae Beta lactamase negative     09/03/2014 (A)  Final    Normal louann  Heavy growth Haemophilus influenzae Beta lactamase negative  beta lactamase negative isolates are susceptible to ampicillin,   amoxacillin/clavulanic, levofloxacin and ceftriaxone     08/19/2014 No growth  Final   08/19/2014 No growth  Final   07/24/2014 No growth  Final   07/24/2014 No growth  Final    Specimen Description   Date Value Ref Range Status   07/18/2016 Midstream Urine  Final   05/21/2016 Blood Right Arm   Final   05/21/2016 Blood Left Arm  Final   05/16/2016 Blood  Final   05/16/2016 Blood  Final   05/16/2016 Blood Left Arm  Final   05/16/2016 Blood Right Arm  Final   05/13/2016 Blood Unspecified Site  Final   05/13/2016 Blood Unspecified Site  Final   05/13/2016 Blood Right Arm  Final   05/13/2016 Blood Right Arm  Final   05/13/2016 Midstream Urine  Final   05/13/2016 Nasal  Final   05/13/2016 Blood Venipuncture Collection VPT  Final   09/25/2014 Sputum  Final   09/25/2014 Sputum  Final   09/03/2014 Sputum  Final   09/03/2014 Sputum  Final   08/19/2014 Blood Right Arm  Final        Last check of C difficile  C Diff Toxin B PCR   Date Value Ref Range Status   05/17/2012   Final    Negative: Clostridium difficile target DNA sequences NOT detected, presumed   negative for Clostridium difficile toxin B or the number of bacteria present   may be below the limit of detection for the test.   FDA approved assay performed using [employer] GeneXpert real-time PCR.   A negative result does not exclude actual disease due to Clostridium difficile   and may be due to improper collection, handling and storage of the specimen or   the number of organisms in the specimen is below the detection limit of the   assay.       Infectious Disease Testing     Recent Labs   Lab Test  08/19/14   1529   TBRSLT  Negative       Virology:  EBV DNA Copies/mL   Date Value Ref Range Status   08/22/2017 EBV DNA Not Detected EBVNEG^EBV DNA Not Detected [Copies]/mL Final   04/11/2017 (A) EBVNEG [Copies]/mL Final    <500  EBV DNA Detected below the reportable range of 500 Copies/mL     12/09/2016 1219 (A) EBVNEG [Copies]/mL Final   08/08/2016 89306 (A) EBVNEG [Copies]/mL Final   07/26/2016 72525 (A) EBVNEG [Copies]/mL Final   05/24/2016 01559 (A) EBVNEG [Copies]/mL Final       BK Virus Testing     BK Virus Result   Date Value Ref Range Status   07/28/2011 <1000 <1000 copies/mL Final       BK Virus Testing     Recent Labs   Lab Test  07/28/11   1150    BKSPEC  Urine   BKRES  <1000   BKLOG  <3.0 The lower limit of detection for this assay is 1000 copies/mL.  Real-time TaqMan  PCR was performed using BK primers and probe for the detection of a 90 bp  portion of the  1 gene.  The performance characteristics were validated by  the [facility], [place].  It has not been cleared or approved by the U.S. Food and Drug Administration.       Hepatitis B Testing     Recent Labs   Lab Test  05/13/16   1723  03/26/12   0856   AUSAB  0.09   --    HBSAB   --   0.0   HBCAB  Nonreactive  Negative   HEPBANG  Nonreactive  Negative        Hepatitis C Antibody   Date Value Ref Range Status   05/13/2016  NR Final    Nonreactive   Assay performance characteristics have not been established for newborns,   infants, and children     03/26/2012 Negative NEG Final       CMV Antibody IgG   Date Value Ref Range Status   05/13/2016  0.0 - 0.8 AI Final    <0.2  Negative   Antibody index (AI) values reflect qualitative changes in antibody   concentration that cannot be directly associated with clinical condition or   disease state.       CMV IgG Antibody   Date Value Ref Range Status   08/15/2013 <0.20  Negative for anti-CMV IgG U/mL Final     EBV Capsid Antibody IgG   Date Value Ref Range Status   05/13/2016 (H) 0.0 - 0.8 AI Final    >8.0  Positive, suggests recent or past exposure   Antibody index (AI) values reflect qualitative changes in antibody   concentration that cannot be directly associated with clinical condition or   disease state.       EBV VCA IgG Antibody   Date Value Ref Range Status   08/15/2013 >750.00  Positive, suggests immunologic exposure. U/mL Final       Imaging:  Recent Results (from the past 48 hour(s))   CT Abdomen Pelvis w Contrast    Narrative    EXAMINATION: CT ABDOMEN PELVIS W CONTRAST, 9/1/2017 12:44 PM    TECHNIQUE:  Helical CT images from the lung bases through the  symphysis pubis were obtained with IV contrast. Contrast dose:  115cc  Isovue 370    COMPARISON: CT abdomen 8/31/2017    HISTORY: febrile with h/o rectal perforation s/p colonoscopy    FINDINGS:    Abdomen and pelvis: Surgical clips are again noted in the rectum  consistent with history of rectal perforation repair. There is mild  increase in size of a right sided perirectal hyperdense collection  (series 3 image 45) measuring 3.0 x 2.5 cm, previously 2.1 x 1.8 cm.  This is unchanged in CT attenuation values measuring between 45 and 50  Hounsfield units. No definite extravasated enteric contrast present.  There is an overall decrease in size of localized free air adjacent to  the collection since yesterday's exam. Small amount of simple fluid  attenuation free fluid surrounds the uterus and adnexa, new since the  prior exam. No new foci of free air in the abdomen.    Sigmoid diverticulosis without diverticulitis. No bowel obstruction.  Postsurgical changes of partial hepatic transplantation with unchanged  pneumobilia. Mild intrahepatic biliary ductal dilatation status post  cholecystectomy is unchanged. Normal spleen. Fatty infiltration of the  pancreas. Normal adrenals. Cortical thinning of both kidneys, left  greater than right is unchanged. Tiny hypodensities in both kidneys  are too small to characterize. No lymphadenopathy in the  abdomen/pelvis by size criteria. Multiple midline and paramedian  ventral wall abdominal hernias containing fat are unchanged in  appearance.    Lung bases: Known lingular nodule is not included in the field-of-view  on the current exam. Right lower lobe pleural-based calcifications are  unchanged. Parenchymal calcifications and surrounding groundglass  opacities in the right lower lobe are also unchanged.    Bones and soft tissues: No acute or suspicious osseous abnormality.  Soft tissues unremarkable.      Impression    IMPRESSION:   1. Mild increase in size of right perirectal hematoma status post  clipping for rectal perforation repair.  Decreased amount of free air  with no new foci of free air identified.  2. Small amount of simple attenuation free fluid about the uterus and  adnexa is likely reactive.    I have personally reviewed the examination and initial interpretation  and I agree with the findings.    BRUNO LEMUS MD

## 2017-09-02 NOTE — PROGRESS NOTES
"Surgery Cross-Cover Note  9/2/2017 1:01 AM     Patient is asymptomatic and sleeping comfortably.     /63 (BP Location: Right arm)  Pulse 77  Temp 99.4  F (37.4  C) (Oral)  Resp 16  Ht 1.715 m (5' 7.5\")  Wt 84.8 kg (187 lb)  SpO2 97%  BMI 28.86 kg/m2      Exam:  Abd: soft, non tender, not distended, no rebound, no guarding    Assessment/Plan:  To continue with primary team's management    Claudette Morrell MD  PGY-1 General Surgery  Pager # [phone]    "

## 2017-09-02 NOTE — PROGRESS NOTES
"Subjective: No acute issues overnight. Pain well controlled. Denies n/v. Passing flatus.    Objective:   /57 (BP Location: Left arm)  Pulse 73  Temp 98.1  F (36.7  C) (Oral)  Resp 16  Ht 1.715 m (5' 7.5\")  Wt 84.8 kg (187 lb)  SpO2 98%  BMI 28.86 kg/m2    PE:  Gen: Awake, alert, NAD   CV: RRR  Resp: non-labored at rest  Abd: Soft, non-distended, NTTP   Ext: warm and well perfused    I/O last 3 completed shifts:  In: 2075 [I.V.:2075]  Out: 1350 [Urine:1350] - Last 24 hours      Labs/Imaging  Heme:  Recent Labs  Lab 09/02/17  0648 09/01/17  0832 08/31/17  1306   WBC 10.2 9.4 10.6   HGB 10.5* 11.1* 12.4    269 290     Chem:  Recent Labs  Lab 09/01/17  0832 08/31/17  1306   POTASSIUM 3.4 3.9   CR 1.39* 1.17*         A/P: Luz Thompson is a 66 yo F with multiple comorbidities including immunosuppression for liver transplant, who developed a rectal perforation after colonoscopy on 8/30. The perforation was identified during scoping and was clipped. CT scan demonstrated small collection next to perforation as well as some air tracking but still beneath the peritoneum. Patient stable overnight but with low grade fever that self resolved. She is immunosuppressed and thus may mask typical signs/symptoms suggestive of worsening clinical status. Repeat CT scan 9/1 showed increase in size of perirectal hematoma, but decrease in air collection tracking beneath peritoneum. Stable - doing well.     NEURO Pain well controlled on PRN IV Dilaudid.   CV HDS.    PULM No issues   FEN/GI mIVF @ 75 ml/hr. Continue NPO. Trend LFTs Replace K <4    No acute issues, good UOP    HEME Hgb as above.    ID Afebrile, WBC 10.2.  Trend CRP.  Antibiotics: meropenem, voriconazole    ENDO No issues   ACTIVITY Up as tolerated  Ambulate at least TID    PPx SCDs, ambulation         Patient seen and discussed with the Fellow.    Audrey Sparrow, PGY-1  General Surgery    Attending addendum:  Seen and examined. Agree with above " documentation.  Abd is totally benign. No bowel function.  No sig rectal pain. No difficulty voiding.  Looks great.  If no fevers overnight, can start clears and Benefiber.

## 2017-09-03 LAB
ALBUMIN SERPL-MCNC: 2.5 G/DL (ref 3.4–5)
ALP SERPL-CCNC: 189 U/L (ref 40–150)
ALT SERPL W P-5'-P-CCNC: 32 U/L (ref 0–50)
AST SERPL W P-5'-P-CCNC: 20 U/L (ref 0–45)
BILIRUB DIRECT SERPL-MCNC: <0.1 MG/DL (ref 0–0.2)
BILIRUB SERPL-MCNC: 0.4 MG/DL (ref 0.2–1.3)
CRP SERPL-MCNC: 64 MG/L (ref 0–8)
ERYTHROCYTE [DISTWIDTH] IN BLOOD BY AUTOMATED COUNT: 16.6 % (ref 10–15)
GLUCOSE BLDC GLUCOMTR-MCNC: 73 MG/DL (ref 70–99)
HCT VFR BLD AUTO: 35.1 % (ref 35–47)
HGB BLD-MCNC: 11 G/DL (ref 11.7–15.7)
MAGNESIUM SERPL-MCNC: 2 MG/DL (ref 1.6–2.3)
MCH RBC QN AUTO: 27.2 PG (ref 26.5–33)
MCHC RBC AUTO-ENTMCNC: 31.3 G/DL (ref 31.5–36.5)
MCV RBC AUTO: 87 FL (ref 78–100)
PLATELET # BLD AUTO: 232 10E9/L (ref 150–450)
POTASSIUM SERPL-SCNC: 3.7 MMOL/L (ref 3.4–5.3)
PROT SERPL-MCNC: 7.1 G/DL (ref 6.8–8.8)
RBC # BLD AUTO: 4.04 10E12/L (ref 3.8–5.2)
WBC # BLD AUTO: 8.4 10E9/L (ref 4–11)

## 2017-09-03 PROCEDURE — A9270 NON-COVERED ITEM OR SERVICE: HCPCS | Mod: GY | Performed by: STUDENT IN AN ORGANIZED HEALTH CARE EDUCATION/TRAINING PROGRAM

## 2017-09-03 PROCEDURE — 25000128 H RX IP 250 OP 636: Performed by: PHYSICIAN ASSISTANT

## 2017-09-03 PROCEDURE — 83735 ASSAY OF MAGNESIUM: CPT | Performed by: STUDENT IN AN ORGANIZED HEALTH CARE EDUCATION/TRAINING PROGRAM

## 2017-09-03 PROCEDURE — 25800025 ZZH RX 258: Performed by: STUDENT IN AN ORGANIZED HEALTH CARE EDUCATION/TRAINING PROGRAM

## 2017-09-03 PROCEDURE — 85027 COMPLETE CBC AUTOMATED: CPT | Performed by: STUDENT IN AN ORGANIZED HEALTH CARE EDUCATION/TRAINING PROGRAM

## 2017-09-03 PROCEDURE — 25000128 H RX IP 250 OP 636: Performed by: STUDENT IN AN ORGANIZED HEALTH CARE EDUCATION/TRAINING PROGRAM

## 2017-09-03 PROCEDURE — 25000132 ZZH RX MED GY IP 250 OP 250 PS 637: Mod: GY | Performed by: INTERNAL MEDICINE

## 2017-09-03 PROCEDURE — 25000128 H RX IP 250 OP 636: Performed by: INTERNAL MEDICINE

## 2017-09-03 PROCEDURE — 84132 ASSAY OF SERUM POTASSIUM: CPT | Performed by: STUDENT IN AN ORGANIZED HEALTH CARE EDUCATION/TRAINING PROGRAM

## 2017-09-03 PROCEDURE — 25000125 ZZHC RX 250: Performed by: PHYSICIAN ASSISTANT

## 2017-09-03 PROCEDURE — 86140 C-REACTIVE PROTEIN: CPT | Performed by: STUDENT IN AN ORGANIZED HEALTH CARE EDUCATION/TRAINING PROGRAM

## 2017-09-03 PROCEDURE — A9270 NON-COVERED ITEM OR SERVICE: HCPCS | Mod: GY | Performed by: INTERNAL MEDICINE

## 2017-09-03 PROCEDURE — A9270 NON-COVERED ITEM OR SERVICE: HCPCS | Mod: GY | Performed by: PHYSICIAN ASSISTANT

## 2017-09-03 PROCEDURE — 25000132 ZZH RX MED GY IP 250 OP 250 PS 637: Mod: GY | Performed by: STUDENT IN AN ORGANIZED HEALTH CARE EDUCATION/TRAINING PROGRAM

## 2017-09-03 PROCEDURE — 80076 HEPATIC FUNCTION PANEL: CPT | Performed by: STUDENT IN AN ORGANIZED HEALTH CARE EDUCATION/TRAINING PROGRAM

## 2017-09-03 PROCEDURE — 25000132 ZZH RX MED GY IP 250 OP 250 PS 637: Mod: GY | Performed by: PHYSICIAN ASSISTANT

## 2017-09-03 PROCEDURE — 36415 COLL VENOUS BLD VENIPUNCTURE: CPT | Performed by: STUDENT IN AN ORGANIZED HEALTH CARE EDUCATION/TRAINING PROGRAM

## 2017-09-03 PROCEDURE — 00000146 ZZHCL STATISTIC GLUCOSE BY METER IP

## 2017-09-03 PROCEDURE — 12000001 ZZH R&B MED SURG/OB UMMC

## 2017-09-03 RX ORDER — ACETAMINOPHEN 325 MG/1
325 TABLET ORAL EVERY 4 HOURS PRN
Status: DISCONTINUED | OUTPATIENT
Start: 2017-09-03 | End: 2017-09-03

## 2017-09-03 RX ORDER — METOPROLOL SUCCINATE 50 MG/1
50 TABLET, EXTENDED RELEASE ORAL DAILY
Status: DISCONTINUED | OUTPATIENT
Start: 2017-09-03 | End: 2017-09-08 | Stop reason: HOSPADM

## 2017-09-03 RX ORDER — AMOXICILLIN 250 MG
1 CAPSULE ORAL 2 TIMES DAILY
Status: DISCONTINUED | OUTPATIENT
Start: 2017-09-03 | End: 2017-09-08 | Stop reason: HOSPADM

## 2017-09-03 RX ORDER — DEXTROSE MONOHYDRATE 25 G/50ML
25 INJECTION, SOLUTION INTRAVENOUS
Status: DISCONTINUED | OUTPATIENT
Start: 2017-09-03 | End: 2017-09-08 | Stop reason: HOSPADM

## 2017-09-03 RX ORDER — ACETAMINOPHEN 325 MG/1
650 TABLET ORAL 3 TIMES DAILY
Status: DISCONTINUED | OUTPATIENT
Start: 2017-09-03 | End: 2017-09-04

## 2017-09-03 RX ADMIN — POTASSIUM CHLORIDE, DEXTROSE MONOHYDRATE AND SODIUM CHLORIDE: 75; 5; 450 INJECTION, SOLUTION INTRAVENOUS at 05:30

## 2017-09-03 RX ADMIN — MEROPENEM 1 G: 1 INJECTION, POWDER, FOR SOLUTION INTRAVENOUS at 06:21

## 2017-09-03 RX ADMIN — ACETAMINOPHEN 650 MG: 325 TABLET ORAL at 16:07

## 2017-09-03 RX ADMIN — LEVOTHYROXINE SODIUM 150 MCG: 150 TABLET ORAL at 08:38

## 2017-09-03 RX ADMIN — SODIUM CHLORIDE 300 MG: 9 INJECTION, SOLUTION INTRAVENOUS at 08:38

## 2017-09-03 RX ADMIN — METOPROLOL TARTRATE 5 MG: 5 INJECTION INTRAVENOUS at 06:21

## 2017-09-03 RX ADMIN — METOPROLOL SUCCINATE 50 MG: 50 TABLET, EXTENDED RELEASE ORAL at 12:36

## 2017-09-03 RX ADMIN — SENNOSIDES AND DOCUSATE SODIUM 1 TABLET: 8.6; 5 TABLET ORAL at 20:57

## 2017-09-03 RX ADMIN — URSODIOL 250 MG: 250 TABLET ORAL at 16:07

## 2017-09-03 RX ADMIN — SENNOSIDES AND DOCUSATE SODIUM 1 TABLET: 8.6; 5 TABLET ORAL at 10:00

## 2017-09-03 RX ADMIN — PREDNISONE 5 MG: 5 TABLET ORAL at 08:38

## 2017-09-03 RX ADMIN — Medication 10 MEQ: at 10:01

## 2017-09-03 RX ADMIN — MEROPENEM 1 G: 1 INJECTION, POWDER, FOR SOLUTION INTRAVENOUS at 12:39

## 2017-09-03 RX ADMIN — URSODIOL 250 MG: 250 TABLET ORAL at 08:38

## 2017-09-03 RX ADMIN — MEROPENEM 1 G: 1 INJECTION, POWDER, FOR SOLUTION INTRAVENOUS at 17:32

## 2017-09-03 RX ADMIN — HYDROMORPHONE HYDROCHLORIDE 0.3 MG: 1 INJECTION, SOLUTION INTRAMUSCULAR; INTRAVENOUS; SUBCUTANEOUS at 12:46

## 2017-09-03 RX ADMIN — Medication 12.5 MG: at 08:38

## 2017-09-03 RX ADMIN — Medication 10 MEQ: at 18:58

## 2017-09-03 RX ADMIN — ACETAMINOPHEN 650 MG: 325 TABLET ORAL at 23:11

## 2017-09-03 RX ADMIN — SODIUM CHLORIDE 300 MG: 9 INJECTION, SOLUTION INTRAVENOUS at 20:57

## 2017-09-03 RX ADMIN — MEROPENEM 1 G: 1 INJECTION, POWDER, FOR SOLUTION INTRAVENOUS at 23:11

## 2017-09-03 RX ADMIN — Medication 5 MG: at 21:26

## 2017-09-03 ASSESSMENT — PAIN DESCRIPTION - DESCRIPTORS
DESCRIPTORS: PRESSURE
DESCRIPTORS: ACHING
DESCRIPTORS: SHARP;RADIATING;ACHING

## 2017-09-03 NOTE — PLAN OF CARE
Problem: Goal Outcome Summary  Goal: Goal Outcome Summary  Outcome: No Change  VSS, HTN.  Scheduled metoprolol given.  Up ad ashley.  Was in chair x >1 hour and walked long distances in hallway.  Tolerating CLD, denies nausea or abdominal pain.  C/o increased rectal pain with attempt at having a BM.  Did not have a BM but did have a small amount of BRB per rectum with increased rectal pain.  Pt states she did not strain while attempting to have BM. Dilaudid for pain with adequate control.  MD evaluated pt, no new orders obtained.  Potassium replacement started.  Continue to monitor for signs of bleeding and fever.

## 2017-09-03 NOTE — PROGRESS NOTES
"Subjective: No acute issues overnight. Doing well, passing flatus. Feels she may have a BM this morning. Will advance to CLD and allow patient to use her home benefiber as this works for her better than hospital formulary.    Objective:   /62  Pulse 76  Temp 98  F (36.7  C) (Oral)  Resp 16  Ht 1.715 m (5' 7.5\")  Wt 87 kg (191 lb 12.8 oz)  SpO2 94%  BMI 29.6 kg/m2    PE:  Gen: Awake, alert, NAD   CV: RRR  Resp: non-labored at rest  Abd: Soft, non-distended, NTTP  Ext: warm and well perfused    I/O last 3 completed shifts:  In: 2243.75 [P.O.:120; I.V.:2123.75]  Out: 2150 [Urine:2150] - Last 24 hours      Labs/Imaging  Heme:  Recent Labs  Lab 09/02/17  0648 09/01/17  0832 08/31/17  1306   WBC 10.2 9.4 10.6   HGB 10.5* 11.1* 12.4    269 290     Chem:  Recent Labs  Lab 09/03/17  0703 09/02/17  0648 09/01/17  0832 08/31/17  1306   POTASSIUM 3.7 3.8 3.4 3.9   CR  --  1.14* 1.39* 1.17*         A/P: Luz Thompson is a 68 yo F with multiple comorbidities including immunosuppression for liver transplant, who developed a rectal perforation after colonoscopy on 8/30. The perforation was identified during scoping and was clipped. CT scan demonstrated small collection next to perforation as well as some air tracking but still beneath the peritoneum. Patient stable overnight but with low grade fever that self resolved. She is immunosuppressed and thus may mask typical signs/symptoms suggestive of worsening clinical status. Repeat CT scan 9/1 showed increase in size of perirectal hematoma, but decrease in air collection tracking beneath peritoneum. Stable - doing well.      NEURO Pain well controlled on PRN IV Dilaudid.   CV HDS.    PULM No issues   FEN/GI mIVF @ 75 ml/hr. Trend LFTs Replace K <4 Advance diet to CLD. Bowel regimen: senna and home benefiber    No acute issues, good UOP    HEME Hgb as above.    ID Afebrile, WBC 10.2.  Trend CRP.  Antibiotics: meropenem, voriconazole    ENDO No issues "   ACTIVITY Up as tolerated  Ambulate at least TID    PPx SCDs, ambulation       Patient seen and discussed with the Fellow.    Audrey Sparrow, PGY-1  General Surgery    Attending addendum: seen and examined, agree with above.  No fevers.  No BM, + flatus.  Pain in the rectal area with sitting- this is stable.  Red blood on toilet paper    Monitor closely for now.  Tylenol dosing- discussed with hepatology- will do 650 mg TID for now.

## 2017-09-03 NOTE — PROVIDER NOTIFICATION
Notified MD at 0315 AM regarding glucose.      Spoke with: Dr Mary Anne Stark, [phone]    Orders were obtained.    Comments:  0200 BS=73.  Earlier 74, and yesterday morning 49.  Pt has IV fluids D5 1/2NS+10KCl infusing @75/hr.    MD notified of glucoses.  Requested glucose as PRN order.    MD ordered 1x PRN order for blood glucose < 70.  Will continue to monitor pt closely.

## 2017-09-03 NOTE — PLAN OF CARE
Problem: Goal Outcome Summary  Goal: Goal Outcome Summary  Outcome: Improving  Assumed care for this patient at 1930.  Tmax 99.4.  Hypertensive 171/66, but was 161/62 on recheck after legs were uncrossed.  Up ad ashley in room.  Voiding adequate amounts.  Reports positive flatus.  Reports continued rectal discomfort, declined need for pain medication and using hot pad instead with adequate relief.  NPO, taking ice chips for oral comfort.  AM BG noted to be low at 49, recheck was 74 at 8 pm.

## 2017-09-03 NOTE — PLAN OF CARE
Problem: Goal Outcome Summary  Goal: Goal Outcome Summary  Outcome: Improving  Assumed care of patient at appx 1500 - 1930.     VSS. Up ad ashley. PIV infusing. Pt reports adequate pain management with scheduled oral tylenol, declined any additional prn's - reports rectal/ tail bone pain as pressure pain. Denies nausea - tolerating clear liquids with no trouble or abdominal discomfort. Voiding spontaneously in adequate amounts. Pt reports passing gas, last BM Wednesday, August 30th - per pt report.      Continue to monitor for s/s of infection or bowel perforation.

## 2017-09-03 NOTE — PLAN OF CARE
Problem: Goal Outcome Summary  Goal: Goal Outcome Summary  Outcome: Improving  Tmax 99.8, last 99.1.  VSS (HTN, but on scheduled metoprolol).    Continues with rectal pain/discomfort, but declined meds.  Aqua-K pad with relief.  Up with minimal assist.  Voiding good vols.  Scant serosang rectal drainage with wiping.  NPO x ice chips.  PIV@75.  02 BS=73.  MD notified, PRN order if BG<70.    Cont to monitor VS, labs closely.  Pain meds as needed.

## 2017-09-03 NOTE — PROVIDER NOTIFICATION
Notified Resident at 1300  regarding change in condition.      Spoke with: Dr. Sparrow    Orders were not obtained.    Comments: Pt reports increased rectal pain after attempting to have BM.  Pt did not have a BM but did have a small amount of bright red blood on tissue when she wiped.  Dilaudid given for pain with improvement, MD notified of blood per rectum, came to see pt.  No new orders.

## 2017-09-04 LAB
ALBUMIN SERPL-MCNC: 2.3 G/DL (ref 3.4–5)
ALP SERPL-CCNC: 163 U/L (ref 40–150)
ALT SERPL W P-5'-P-CCNC: 26 U/L (ref 0–50)
ANION GAP SERPL CALCULATED.3IONS-SCNC: 9 MMOL/L (ref 3–14)
AST SERPL W P-5'-P-CCNC: 24 U/L (ref 0–45)
BILIRUB DIRECT SERPL-MCNC: <0.1 MG/DL (ref 0–0.2)
BILIRUB SERPL-MCNC: 0.2 MG/DL (ref 0.2–1.3)
BUN SERPL-MCNC: 9 MG/DL (ref 7–30)
CALCIUM SERPL-MCNC: 8.1 MG/DL (ref 8.5–10.1)
CHLORIDE SERPL-SCNC: 112 MMOL/L (ref 94–109)
CO2 SERPL-SCNC: 24 MMOL/L (ref 20–32)
CREAT SERPL-MCNC: 1.09 MG/DL (ref 0.52–1.04)
ERYTHROCYTE [DISTWIDTH] IN BLOOD BY AUTOMATED COUNT: 16.6 % (ref 10–15)
GFR SERPL CREATININE-BSD FRML MDRD: 50 ML/MIN/1.7M2
GLUCOSE SERPL-MCNC: 101 MG/DL (ref 70–99)
HCT VFR BLD AUTO: 33.7 % (ref 35–47)
HGB BLD-MCNC: 10.6 G/DL (ref 11.7–15.7)
MCH RBC QN AUTO: 26.8 PG (ref 26.5–33)
MCHC RBC AUTO-ENTMCNC: 31.5 G/DL (ref 31.5–36.5)
MCV RBC AUTO: 85 FL (ref 78–100)
PLATELET # BLD AUTO: 244 10E9/L (ref 150–450)
POTASSIUM SERPL-SCNC: 3.8 MMOL/L (ref 3.4–5.3)
PROT SERPL-MCNC: 6.3 G/DL (ref 6.8–8.8)
RBC # BLD AUTO: 3.96 10E12/L (ref 3.8–5.2)
SODIUM SERPL-SCNC: 145 MMOL/L (ref 133–144)
WBC # BLD AUTO: 6.4 10E9/L (ref 4–11)

## 2017-09-04 PROCEDURE — 25800025 ZZH RX 258: Performed by: STUDENT IN AN ORGANIZED HEALTH CARE EDUCATION/TRAINING PROGRAM

## 2017-09-04 PROCEDURE — 36415 COLL VENOUS BLD VENIPUNCTURE: CPT | Performed by: STUDENT IN AN ORGANIZED HEALTH CARE EDUCATION/TRAINING PROGRAM

## 2017-09-04 PROCEDURE — A9270 NON-COVERED ITEM OR SERVICE: HCPCS | Mod: GY | Performed by: SURGERY

## 2017-09-04 PROCEDURE — A9270 NON-COVERED ITEM OR SERVICE: HCPCS | Mod: GY | Performed by: INTERNAL MEDICINE

## 2017-09-04 PROCEDURE — 25000132 ZZH RX MED GY IP 250 OP 250 PS 637: Mod: GY | Performed by: STUDENT IN AN ORGANIZED HEALTH CARE EDUCATION/TRAINING PROGRAM

## 2017-09-04 PROCEDURE — 40000556 ZZH STATISTIC PERIPHERAL IV START W US GUIDANCE

## 2017-09-04 PROCEDURE — A9270 NON-COVERED ITEM OR SERVICE: HCPCS | Mod: GY | Performed by: PHYSICIAN ASSISTANT

## 2017-09-04 PROCEDURE — A9270 NON-COVERED ITEM OR SERVICE: HCPCS | Mod: GY | Performed by: STUDENT IN AN ORGANIZED HEALTH CARE EDUCATION/TRAINING PROGRAM

## 2017-09-04 PROCEDURE — 80048 BASIC METABOLIC PNL TOTAL CA: CPT | Performed by: STUDENT IN AN ORGANIZED HEALTH CARE EDUCATION/TRAINING PROGRAM

## 2017-09-04 PROCEDURE — 25000132 ZZH RX MED GY IP 250 OP 250 PS 637: Mod: GY | Performed by: SURGERY

## 2017-09-04 PROCEDURE — 80076 HEPATIC FUNCTION PANEL: CPT | Performed by: STUDENT IN AN ORGANIZED HEALTH CARE EDUCATION/TRAINING PROGRAM

## 2017-09-04 PROCEDURE — 25000128 H RX IP 250 OP 636: Performed by: INTERNAL MEDICINE

## 2017-09-04 PROCEDURE — 85027 COMPLETE CBC AUTOMATED: CPT | Performed by: SURGERY

## 2017-09-04 PROCEDURE — 25000125 ZZHC RX 250: Performed by: PHYSICIAN ASSISTANT

## 2017-09-04 PROCEDURE — 25000132 ZZH RX MED GY IP 250 OP 250 PS 637: Mod: GY | Performed by: PHYSICIAN ASSISTANT

## 2017-09-04 PROCEDURE — 25000132 ZZH RX MED GY IP 250 OP 250 PS 637: Mod: GY | Performed by: INTERNAL MEDICINE

## 2017-09-04 PROCEDURE — 12000001 ZZH R&B MED SURG/OB UMMC

## 2017-09-04 PROCEDURE — 36415 COLL VENOUS BLD VENIPUNCTURE: CPT | Performed by: SURGERY

## 2017-09-04 RX ORDER — VORICONAZOLE 200 MG/1
200 TABLET, FILM COATED ORAL EVERY 12 HOURS SCHEDULED
Status: DISCONTINUED | OUTPATIENT
Start: 2017-09-04 | End: 2017-09-08 | Stop reason: HOSPADM

## 2017-09-04 RX ORDER — MAGNESIUM SULFATE HEPTAHYDRATE 40 MG/ML
4 INJECTION, SOLUTION INTRAVENOUS EVERY 4 HOURS PRN
Status: DISCONTINUED | OUTPATIENT
Start: 2017-09-04 | End: 2017-09-08 | Stop reason: HOSPADM

## 2017-09-04 RX ORDER — POTASSIUM CHLORIDE 14.9 MG/ML
20 INJECTION INTRAVENOUS
Status: DISCONTINUED | OUTPATIENT
Start: 2017-09-04 | End: 2017-09-08 | Stop reason: HOSPADM

## 2017-09-04 RX ORDER — POTASSIUM CHLORIDE 7.45 MG/ML
10 INJECTION INTRAVENOUS
Status: DISCONTINUED | OUTPATIENT
Start: 2017-09-04 | End: 2017-09-08 | Stop reason: HOSPADM

## 2017-09-04 RX ORDER — POTASSIUM CHLORIDE 750 MG/1
20-40 TABLET, EXTENDED RELEASE ORAL
Status: DISCONTINUED | OUTPATIENT
Start: 2017-09-04 | End: 2017-09-08 | Stop reason: HOSPADM

## 2017-09-04 RX ORDER — POTASSIUM CL/LIDO/0.9 % NACL 10MEQ/0.1L
10 INTRAVENOUS SOLUTION, PIGGYBACK (ML) INTRAVENOUS
Status: DISCONTINUED | OUTPATIENT
Start: 2017-09-04 | End: 2017-09-08 | Stop reason: HOSPADM

## 2017-09-04 RX ORDER — POTASSIUM CHLORIDE 1.5 G/1.58G
20-40 POWDER, FOR SOLUTION ORAL
Status: DISCONTINUED | OUTPATIENT
Start: 2017-09-04 | End: 2017-09-08 | Stop reason: HOSPADM

## 2017-09-04 RX ORDER — ACETAMINOPHEN 500 MG
1000 TABLET ORAL 2 TIMES DAILY PRN
Status: DISCONTINUED | OUTPATIENT
Start: 2017-09-04 | End: 2017-09-08 | Stop reason: HOSPADM

## 2017-09-04 RX ADMIN — MEROPENEM 1 G: 1 INJECTION, POWDER, FOR SOLUTION INTRAVENOUS at 05:06

## 2017-09-04 RX ADMIN — VORICONAZOLE 200 MG: 200 TABLET, FILM COATED ORAL at 20:41

## 2017-09-04 RX ADMIN — URSODIOL 250 MG: 250 TABLET ORAL at 17:22

## 2017-09-04 RX ADMIN — LEVOTHYROXINE SODIUM 225 MCG: 150 TABLET ORAL at 08:34

## 2017-09-04 RX ADMIN — PREDNISONE 5 MG: 5 TABLET ORAL at 08:34

## 2017-09-04 RX ADMIN — POTASSIUM CHLORIDE 20 MEQ: 1.5 POWDER, FOR SOLUTION ORAL at 10:13

## 2017-09-04 RX ADMIN — MEROPENEM 1 G: 1 INJECTION, POWDER, FOR SOLUTION INTRAVENOUS at 10:50

## 2017-09-04 RX ADMIN — MEROPENEM 1 G: 1 INJECTION, POWDER, FOR SOLUTION INTRAVENOUS at 17:23

## 2017-09-04 RX ADMIN — VORICONAZOLE 200 MG: 200 TABLET, FILM COATED ORAL at 10:13

## 2017-09-04 RX ADMIN — ACETAMINOPHEN 1000 MG: 500 TABLET, FILM COATED ORAL at 08:41

## 2017-09-04 RX ADMIN — POTASSIUM CHLORIDE, DEXTROSE MONOHYDRATE AND SODIUM CHLORIDE: 75; 5; 450 INJECTION, SOLUTION INTRAVENOUS at 02:25

## 2017-09-04 RX ADMIN — SENNOSIDES AND DOCUSATE SODIUM 1 TABLET: 8.6; 5 TABLET ORAL at 20:41

## 2017-09-04 RX ADMIN — SENNOSIDES AND DOCUSATE SODIUM 1 TABLET: 8.6; 5 TABLET ORAL at 08:34

## 2017-09-04 RX ADMIN — ACETAMINOPHEN 1000 MG: 500 TABLET, FILM COATED ORAL at 22:30

## 2017-09-04 RX ADMIN — EZETIMIBE 10 MG: 10 TABLET ORAL at 20:41

## 2017-09-04 RX ADMIN — URSODIOL 250 MG: 250 TABLET ORAL at 08:34

## 2017-09-04 RX ADMIN — Medication 12.5 MG: at 08:35

## 2017-09-04 RX ADMIN — MEROPENEM 1 G: 1 INJECTION, POWDER, FOR SOLUTION INTRAVENOUS at 22:31

## 2017-09-04 RX ADMIN — METOPROLOL SUCCINATE 50 MG: 50 TABLET, EXTENDED RELEASE ORAL at 08:34

## 2017-09-04 ASSESSMENT — PAIN DESCRIPTION - DESCRIPTORS
DESCRIPTORS: ACHING
DESCRIPTORS: ACHING

## 2017-09-04 NOTE — PLAN OF CARE
Problem: Goal Outcome Summary  Goal: Goal Outcome Summary  Outcome: Improving  Patient Vitals for the past 8 hrs:    Temp Temp src BP Resp   09/04/17 1508 97.4  F (36.3  C) Oral 135/63 16        Patient is doing well:     -on Reg diet with good appetite  -up independently  -denies nausea  -stated that she her stool is still with small amount of blood ( MD is aware )  -pain controlled with Tylenol  -PIV TKO     Continue with plan of care.

## 2017-09-04 NOTE — PROGRESS NOTES
"Colorectal Surgery Progress Note    Subjective: No acute issues overnight. Pain controlled. Denies fevers, chills, CP, SOB, and N/V. Tolerating CLD. Voiding. Passing flatus, and two BM.    Objective:   /74 (BP Location: Left arm)  Pulse 70  Temp 97  F (36.1  C) (Oral)  Resp 16  Ht 1.715 m (5' 7.5\")  Wt 87 kg (191 lb 12.8 oz)  SpO2 100%  BMI 29.6 kg/m2    Gen: Awake, alert, NAD   CV: RRR  Resp: non-labored at rest  Abd: Soft, non-distended, NTTP  Ext: warm and well perfused    A/P: Luz Thompson is a 68 yo F with h/o liver txp on immunosuppression who developed a rectal perforation after colonoscopy on 8/30. She is recovering well, with return of bowel function.    -Advance to regular diet  -Patient aware of hepatology recommendation re: APAP and prefers to have it available as 1g BID PRN  -D/c IVF  -Plan for likely discharge in the next day or two.    Discussed with staff.    Ha Baron MD  General Surgery PGY-3    Physician Attestation   I, Trisha Goodwin, saw this patient with the resident and agree with the resident s findings and plan of care as documented in the resident s note.      I personally reviewed vital signs, medications and labs.  Afebrile  Key findings: Less rectal pain with sitting.  ++ explosive loose stool today with relief. On going red blood in the stool.  Hgb stable. abd benign. Plan as above. Question of repeat imaging- will defer to Dr. Farrell, not currently indicated.    Lab Results   Component Value Date    WBC 6.4 09/04/2017     Lab Results   Component Value Date    RBC 3.96 09/04/2017     Lab Results   Component Value Date    HGB 10.6 09/04/2017     Lab Results   Component Value Date    HCT 33.7 09/04/2017     Lab Results   Component Value Date    MCV 85 09/04/2017     Lab Results   Component Value Date    MCH 26.8 09/04/2017     Lab Results   Component Value Date    MCHC 31.5 09/04/2017     Lab Results   Component Value Date    RDW 16.6 09/04/2017     Lab Results "   Component Value Date     09/04/2017     Last Basic Metabolic Panel:  Lab Results   Component Value Date     09/04/2017      Lab Results   Component Value Date    POTASSIUM 3.8 09/04/2017     Lab Results   Component Value Date    CHLORIDE 112 09/04/2017     Lab Results   Component Value Date    KEILY 8.1 09/04/2017     Lab Results   Component Value Date    CO2 24 09/04/2017     Lab Results   Component Value Date    BUN 9 09/04/2017     Lab Results   Component Value Date    CR 1.09 09/04/2017     Lab Results   Component Value Date     09/04/2017         Trisha Goodwin  Date of Service (when I saw the patient): 09/04/17

## 2017-09-04 NOTE — PLAN OF CARE
Problem: Goal Outcome Summary  Goal: Goal Outcome Summary  Outcome: Improving  Contact Iso for VRE. AVSS. Pain managed with scheduled tylenol. Denies any prn interventions. Denies nausea. RPIV infusing IVMF and abx. Tolerating clear liquids. Passing gas, no BM this shift. Last BM on 8/30 reported by pt. Voiding spontaneously. Pt took a shower last night, scant amount of bloody drainage from anus, reported from patient while showering. MD's aware. Pt UAL. Continue to monitor for s/s of infection or bowel perforation.

## 2017-09-04 NOTE — PLAN OF CARE
"Problem: Goal Outcome Summary  Goal: Goal Outcome Summary  Outcome: Improving  /74 (BP Location: Left arm)  Pulse 70  Temp 97  F (36.1  C) (Oral)  Resp 16  Ht 1.715 m (5' 7.5\")  Wt 87 kg (191 lb 12.8 oz)  SpO2 100%  BMI 29.6 kg/m2     Pain managed with PRN tylenol. Denies nausea and tolerating regular diet. PIV TKO. Passing gas. Had 4 loose BMs today with streaks of blood. Blood has decreased with each bowel movement. Replaced potassium and ordered recheck for tomorrow. Voiding spont. Up ad ashley.       "

## 2017-09-04 NOTE — PROGRESS NOTES
[facility]  Transplant Infectious Disease Progress Note      Patient:  Luz Thompson, Date of birth 1950, Medical record number [id number]  Date of Visit:  09/04/2017         Assessment and Recommendations:   Recommendations:  - continue meropenem.   - voriconazole was switched to PO.   - I will discuss with Dr. Montero an oral option such as bactrim/flagyl vs IV meropenem vs no ABx and monitor clinically.       Assessment:  Virginia is a 67 year old woman immunosuppressed s/p 2002 liver transplant with recurrent gram negative sepsis. She has known sigmoid diverticulosis. She gets episodes of defecation syncope.  Infectious Disease issues include:  - Rectal perforation with fever after colonoscopy 8/31/2017. Eloquis and rapamune stopped to assist with wound healing. Bronchoscopy postponed so that any interior pressure created by coughing does not open perf any further. Given many allergies, she was started on meropenem to cover aerobes and anaerobes.  The fever now resolved. It was likely due to perforated viscus that may have resulted in transient bacteremia due to translocation of colonic bacteria or due to local inflammatory process without infection resulted from the perforation and the local hematoma.   - Pulmonary Coccidiodes infection. Followup CT imaging shows just a little increased uptake in lung nodule on PET imaging. Bronchoscopy deferred due to rectal perforation. Will continue voriconazole IV and switch to PO once cleared for PO intake.     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  - Self-triggered use of prn antibiotics due to recurrent episodes of sepsis and cholangitis with bactrim. If not responding she usually presents to ED for ertapenem.   - Hx of bacterial superinfection of chronic venous stasis changes on the legs, 7/27/2016.  - Hx of recurrent E coli sepsis 8/13/2013, 6/10/2015.   - Hx of Klebsiella  UTI, 7/18/16. She completed a 14-day course of macrobid.  - Hx of Haemophilus influenzae pneumonia in 9/2014.  - Hx of angular chelitis, hx of thrush extending from under her upper denture plate, and onychomycosis.   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   - PCP prophylaxis: Not needed, as most recent CD4 count was > 200.   - Serostatus: CMV seronegative, EBV seropositive on 8/15/13.  - Immunization status: Completed PCV13 and PPSV23 x2 with last dose in 5/2016.  - Gamma globulin status: Endogenously replete.  - Isolation status: Good hand hygience. When she is an inpatient, she needs to be in contact isolation for known VRE carriage.    Branden Meraz   Pager: [phone]  9/4/2017            Interim History:   No new complaints or events.         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 She has venous stasis changes of her legs, and she can get stasis dermatitis if she does not wear her compression stockings. This is especially problematic if the weather is very warm and for that reason she does not want to wear compression stockings. She received Rituxan therapy for EBV viremia on 8/31/2016. She had some side effects in the days following the dose of Rituxan, but otherwise would be able to tolerate it again in the future. In the [id number] winter season, she was diagnosed with coccidioidomycosis. There was a strong pulmonary component to the infection, and she is being treated with voriconazole. She had a 7/31/2017 visit with pulmonary. There is a lung nodule in the lingula, and a slightly enlarging rounded nodular opacity. Differential includes developing granuloma from resolved infection, new or superimposed infection, malignancy. She then had a PET/CT which shows that the nodule is avid, so there is a plan to pursue a percutaneous lung biopsy shortly.       Since Virginia was last seen  by me in clinic on 8/16/2017, she was in the procedure area today for colonscopy and bronchoscopy. There was a rectal perforation. CT imaging showed some air. I was called and we started meropenem. Pain is now finally controlled, she has been admitted. Overnight plan is to CT in 24-48 hours, and then based on that decide whether to proceed with surgery. It will take 3-5 days for the hole to self-seal in most patients. Rapa and Eloquis were d/c to assist in the healing process.    Transplants:  5/22/2002 (Liver), Postoperative day:  5584.  Coordinator Angelika Luo    Review of Systems:  As mentioned in the interim history otherwise negative by reviewing central and peripheral neurological systems, respiratory system, cardiac system, GI system,  system, musculoskeletal, skin, allergy, and lymphatics.            Current Medications & Allergies:       voriconazole  200 mg Oral Q12H LUZMARIA     metoprolol  50 mg Oral Daily     senna-docusate  1 tablet Oral BID     meropenem  1 g Intravenous Q6H     ezetimibe  10 mg Oral Every Other Day     levothyroxine  150 mcg Oral Once per day on Sun Tue Wed Thu Fri Sat     predniSONE  5 mg Oral Daily     ursodiol  250 mg Oral BID     hydrALAZINE  12.5 mg Oral Daily     levothyroxine  225 mcg Oral Q7 Days     ezetimibe  5 mg Oral Every Other Day       Infusions/Drips:       Allergies   Allergen Reactions     Fluconazole Hives and Itching     Azithromycin Itching     Benadryl [Diphenhydramine Hcl]      Insomnia      Cefpodoxime Itching     Cellcept Diarrhea     Ciprofloxacin Other (See Comments)     Insomnia, mood lability     Codeine      Psych disturbance     Diphenhydramine Other (See Comments)     Doxycycline      Lansoprazole Diarrhea     Levaquin [Levofloxacin] Other (See Comments)     Headache, hyperactivity     Lisinopril Cough     Methotrexate      Sores     Morphine Itching     Morphine Sulfate Itching     Mycophenolate Diarrhea     Pravastatin Muscle Pain (Myalgia)      Simvastatin Muscle Pain (Myalgia)     severe     Cephalexin Rash     Fever and skin burning     Penicillin G Itching and Rash     Tolectin [Nsaids] Rash     Tramadol Rash            Physical Exam:   Vitals were reviewed.  All vitals stable.  Patient Vitals for the past 24 hrs:   BP Temp Temp src Pulse Resp SpO2   09/04/17 0722 146/74 97  F (36.1  C) Oral 70 16 100 %   09/03/17 2228 142/61 98.3  F (36.8  C) Oral - 16 97 %   09/03/17 1502 135/53 98.4  F (36.9  C) Oral - 18 95 %   09/03/17 1236 171/77 - - 75 - -     Ranges for vital signs:  Temp:  [97  F (36.1  C)-98.4  F (36.9  C)] 97  F (36.1  C)  Pulse:  [70-75] 70  Heart Rate:  [66-72] 66  Resp:  [16-18] 16  BP: (135-171)/(53-77) 146/74  SpO2:  [95 %-100 %] 100 %  Vitals:    08/31/17 0717 09/02/17 1100   Weight: 84.8 kg (187 lb) 87 kg (191 lb 12.8 oz)       Physical Examination:  GENERAL:  well-developed, well-nourished, in bed in no acute distress.           Laboratory Data:     Absolute CD4   Date Value Ref Range Status   08/19/2014 415 mm3 Final   09/16/2013 1266 mm3 Final   09/15/2013 DUPLICATE,TESTING DONE ON SPECIMEN FROM 9.16.13 mm3 Final   11/13/2008 1332 mm3 Final       Inflammatory Markers    Recent Labs   Lab Test  09/03/17   0912  09/02/17   0648  09/01/17   0832  05/14/16   0659  05/13/16   0940  09/23/14   0810  08/19/14   1530   06/30/14   0825  05/15/14   1112  05/08/14   0806   SED   --    --    --    --   67*  79*  76*   --   51*  58*  51*   CRP  64.0*  71.0*  51.0*  21.0*  19.0*  6.1  29.8*   < >  12.5*  8.0  <5.0    < > = values in this interval not displayed.       Immune Globulin Studies     Recent Labs   Lab Test  08/19/14   1530  05/21/12   0754   IGG  1220  1070   IGM   --   97   IGE  46   --    IGA   --   85   IGG1  684   --    IGG2  441   --    IGG3  70   --    IGG4  19   --        Metabolic Studies       Recent Labs   Lab Test  09/04/17   0705  09/03/17   0703  09/02/17   0648   05/10/17   0929   05/13/16   2037  05/13/16   1723   05/13/16   0940   07/23/14   1119   02/19/14   0700   NA  145*   --   142   < >  138   < >   --   138  132*   < >  137   < >  141   POTASSIUM  3.8  3.7  3.8   < >  4.2   < >   --   3.9  3.6   < >  3.8   < >  4.7   CHLORIDE  112*   --   110*   < >  102   < >   --   104  96   < >  98   < >  111*   CO2  24   --   20   < >  26   < >   --   25  26   < >  27   < >  21   ANIONGAP  9   --   11   < >  10   < >   --   8  10   < >  12   < >  8   BUN  9   --   14   < >  36*   < >   --   25  29   < >  35*   < >  36*   CR  1.09*   --   1.14*   < >  1.53*   < >   --   1.55*  1.64*   < >  1.70*   < >  1.48*   GFRESTIMATED  50*   --   47*   < >  34*   < >   --   33*  31*   < >  30*   < >  36*   GLC  101*   --   49*   < >  104*   < >   --   89  98   < >  109*   < >  131*   KEILY  8.1*   --   7.7*   < >  9.7   < >   --   8.4*  9.1   < >  9.1   < >  8.3*   PHOS   --    --    --    --   3.1   < >   --    --    --    < >   --    < >   --    MAG   --   2.0   --    < >  2.8*   < >   --    --   2.2   < >   --    < >  2.4*   LACT   --    --    --    --    --    --    --    --   0.8   --   1.7   --    --    PCAL   --    --    --    --    --    --    --   0.38   --    --   0.21   --    --    FGTL   --    --    --    --    --    --   <31  Unit: pg/mL     --    --    < >   --    --    --    CKT   --    --    --    --    --    --    --    --   78   --    --    --   56    < > = values in this interval not displayed.       Hepatic Studies    Recent Labs   Lab Test  09/04/17   0705 09/03/17   0703  09/02/17   0648   07/24/14   0814   09/15/13   1431   12/20/12   2110   BILITOTAL  0.2  0.4  0.3   < >  0.4   < >   --    < >  <0.1*   BILIDELTA   --    --    --    --   0.3   < >   --    < >  0.0   BILICONJ   --    --    --    --   0.0   < >   --    < >  0.0   DBIL  <0.1  <0.1  <0.1   < >   --    --    --    --    --    ALKPHOS  163*  189*  174*   < >  183*   < >   --    < >  93   PROTTOTAL  6.3*  7.1  6.2*   < >  6.6*   < >   --    < >  4.9*   ALBUMIN   2.3*  2.5*  2.2*   < >  3.4   < >   --    < >  2.4*   AST  24  20  26   < >  92*   < >   --    < >  25   ALT  26  32  37   < >  140*   < >   --    < >  31   LDH   --    --    --    --    --    --   603   --   621    < > = values in this interval not displayed.       Pancreatitis testing    Recent Labs   Lab Test  08/22/17   0905   07/23/14   1119   02/18/14   0852   11/06/13   1246   08/13/13   1046   LIPASE   --    --   32   --   55   --   59   --   52   TRIG  447*   < >   --    < >   --    < >   --    < >   --     < > = values in this interval not displayed.       Gout Labs      Recent Labs   Lab Test  12/31/13   1443  07/30/13   1441   URIC  6.7  6.7       Hematology Studies      Recent Labs   Lab Test  09/03/17   0912  09/02/17   0648  09/01/17   0832  08/31/17   1306  07/13/17   0843  05/10/17   0929   WBC  8.4  10.2  9.4  10.6  8.7  9.2   ANEU   --   7.4  6.5  8.6*   --    --    ALYM   --   1.7  1.7  1.3   --    --    KATHY   --   0.9  1.0  0.7   --    --    AEOS   --   0.2  0.1  0.0   --    --    HGB  11.0*  10.5*  11.1*  12.4  11.3*  12.5   HCT  35.1  34.0*  35.0  38.5  35.3  38.8   PLT  232  259  269  290  344  331       Clotting Studies    Recent Labs   Lab Test  05/16/16   0827  05/13/16   0940  11/06/13   1246  10/14/13   0904   INR  1.02  1.11  0.96  0.98   PTT   --   33   --    --        Iron Testing    Recent Labs   Lab Test  09/03/17   0912   07/11/13   0814   04/18/13   0809  03/21/13   0812   12/22/12   1346   12/20/12 2005 12/07/12   1345   IRON   --    --   58   --   63  90   < >   --    --    --   48   FEB   --    --   285   --   315  285   < >   --    --    --   156*   IRONSAT   --    --   20   --   20  32   < >   --    --    --   30   LAURA   --    --   195   < >  232  202   < >   --    --    --   317*   MCV  87   < >  88   < >  92  98   < >  100   < >  103*   --    B12   --    --    --    --    --    --    --    --    --    --   281   HAPT   --    --    --    --    --    --    --    --    --    137   --    RETP   --    --    --    --    --    --    --   2.7*   --    --    --    RETICABSCT   --    --    --    --    --    --    --   71.0   --    --    --     < > = values in this interval not displayed.       Markers    Alpha Fetoprotein   Date Value Ref Range Status   03/26/2012 4.1 0 - 8 ug/L Final     Comment:     Reference ranges apply to non-pregnant females only.       Autoimmune Testing    Recent Labs   Lab Test  05/13/16   1723  08/19/14   1530   PRANAV  <1.0  Interpretation:  Negative    <1.0  Interpretation:  Negative     RHF   --   <20       Thyroid Studies     Recent Labs   Lab Test  07/13/17   0843  05/10/17   0929  07/18/16   1430  05/24/16   0846  07/20/15   1010   09/23/14   0810   05/08/14   0806   TSH  3.66  4.74*  2.43  3.65  3.41   < >  2.87   < >  4.41   T4  1.59*  1.48*  1.38  1.19  1.24   < >  1.29  9.1   --   1.19  9.1   T3   --    --    --    --    --    --   65   --   48*    < > = values in this interval not displayed.       Urine Studies     Recent Labs   Lab Test  08/31/17   2100  08/22/17   0913  07/18/16   1503  05/13/16   1114  04/30/15   0951   08/19/14   1410   URINEPH  8.0*  7.0  5.5  6.5  6.0   < >  7.0   NITRITE  Negative  Negative  Negative  Negative  Negative   < >  Negative   LEUKEST  Small*  Trace*  Large*  Moderate*  Negative   < >  Negative   WBCU  2  2-5*  10-25*  4*   --    --   <1    < > = values in this interval not displayed.       Medication levels    Recent Labs   Lab Test  07/13/17   0843   09/15/13   0435   12/21/12   1344   VANCOMYCIN   --    --   26.3   --    --    RAPAMY  5.8   < >   --    < >   --    MPACID   --    --    --    --   5.42*   MPAG   --    --    --    --   83.5    < > = values in this interval not displayed.       Microbiology:  Cryptococcal antigen    Recent Labs   Lab Test  08/14/13   0822   CRPTT  Negative for cryptococcal antigen A negative cryptococcal antigen test does not exclude cryptococcal infection. If  a fungal culture is  desired, it must be ordered separately.       Beta D Glucan levels (Fungitell assay)    Recent Labs   Lab Test  05/13/16 2037 08/19/14   1530   FGTL  <31  Unit: pg/mL    <31  Unit: pg/mL         Last Culture results with specimen source  Culture Micro   Date Value Ref Range Status   07/18/2016 (A)  Final    50,000 to 100,000 colonies/mL Klebsiella pneumoniae   05/21/2016 No growth  Final   05/21/2016 No growth  Final   05/16/2016 Canceled, Test credited Duplicate request  Final   05/16/2016 Canceled, Test credited Duplicate request  Final   05/16/2016 No growth  Final   05/16/2016 No growth  Final   05/13/2016 No growth after 29 days  Final   05/13/2016   Final    Canceled, Test credited Quantity not sufficient Notification of test cancellation   was given to MATTHEW FROM [facility], HE WILL REORDER AND RECOLLECT     05/13/2016 No growth  Final   05/13/2016 No growth  Final   05/13/2016   Final    10,000 to 50,000 colonies/mL mixed urogenital louann  Susceptibility testing not routinely done     05/13/2016 No growth  Final   09/25/2014 (A)  Final    Normal louann  Moderate growth Haemophilus influenzae Beta lactamase negative     09/03/2014 (A)  Final    Normal louann  Heavy growth Haemophilus influenzae Beta lactamase negative  beta lactamase negative isolates are susceptible to ampicillin,   amoxacillin/clavulanic, levofloxacin and ceftriaxone     08/19/2014 No growth  Final   08/19/2014 No growth  Final   07/24/2014 No growth  Final   07/24/2014 No growth  Final    Specimen Description   Date Value Ref Range Status   07/18/2016 Midstream Urine  Final   05/21/2016 Blood Right Arm  Final   05/21/2016 Blood Left Arm  Final   05/16/2016 Blood  Final   05/16/2016 Blood  Final   05/16/2016 Blood Left Arm  Final   05/16/2016 Blood Right Arm  Final   05/13/2016 Blood Unspecified Site  Final   05/13/2016 Blood Unspecified Site  Final   05/13/2016 Blood Right Arm  Final   05/13/2016 Blood Right Arm  Final   05/13/2016  Midstream Urine  Final   05/13/2016 Nasal  Final   05/13/2016 Blood Venipuncture Collection VPT  Final   09/25/2014 Sputum  Final   09/25/2014 Sputum  Final   09/03/2014 Sputum  Final   09/03/2014 Sputum  Final   08/19/2014 Blood Right Arm  Final        Last check of C difficile  C Diff Toxin B PCR   Date Value Ref Range Status   05/17/2012   Final    Negative: Clostridium difficile target DNA sequences NOT detected, presumed   negative for Clostridium difficile toxin B or the number of bacteria present   may be below the limit of detection for the test.   FDA approved assay performed using [employer] Green GeneXpert real-time PCR.   A negative result does not exclude actual disease due to Clostridium difficile   and may be due to improper collection, handling and storage of the specimen or   the number of organisms in the specimen is below the detection limit of the   assay.       Infectious Disease Testing     Recent Labs   Lab Test  08/19/14   1529   TBRSLT  Negative       Virology:  EBV DNA Copies/mL   Date Value Ref Range Status   08/22/2017 EBV DNA Not Detected EBVNEG^EBV DNA Not Detected [Copies]/mL Final   04/11/2017 (A) EBVNEG [Copies]/mL Final    <500  EBV DNA Detected below the reportable range of 500 Copies/mL     12/09/2016 1219 (A) EBVNEG [Copies]/mL Final   08/08/2016 40105 (A) EBVNEG [Copies]/mL Final   07/26/2016 55359 (A) EBVNEG [Copies]/mL Final   05/24/2016 53936 (A) EBVNEG [Copies]/mL Final       BK Virus Testing     BK Virus Result   Date Value Ref Range Status   07/28/2011 <1000 <1000 copies/mL Final       BK Virus Testing     Recent Labs   Lab Test  07/28/11   1150   BKSPEC  Urine   BKRES  <1000   BKLOG  <3.0 The lower limit of detection for this assay is 1000 copies/mL.  Real-time TaqMan  PCR was performed using BK primers and probe for the detection of a 90 bp  portion of the  1 gene.  The performance characteristics were validated by  the [facility], [place].  It has  not been cleared or approved by the U.S. Food and Drug Administration.       Hepatitis B Testing     Recent Labs   Lab Test  05/13/16   1723  03/26/12   0856   AUSAB  0.09   --    HBSAB   --   0.0   HBCAB  Nonreactive  Negative   HEPBANG  Nonreactive  Negative        Hepatitis C Antibody   Date Value Ref Range Status   05/13/2016  NR Final    Nonreactive   Assay performance characteristics have not been established for newborns,   infants, and children     03/26/2012 Negative NEG Final       CMV Antibody IgG   Date Value Ref Range Status   05/13/2016  0.0 - 0.8 AI Final    <0.2  Negative   Antibody index (AI) values reflect qualitative changes in antibody   concentration that cannot be directly associated with clinical condition or   disease state.       CMV IgG Antibody   Date Value Ref Range Status   08/15/2013 <0.20  Negative for anti-CMV IgG U/mL Final     EBV Capsid Antibody IgG   Date Value Ref Range Status   05/13/2016 (H) 0.0 - 0.8 AI Final    >8.0  Positive, suggests recent or past exposure   Antibody index (AI) values reflect qualitative changes in antibody   concentration that cannot be directly associated with clinical condition or   disease state.       EBV VCA IgG Antibody   Date Value Ref Range Status   08/15/2013 >750.00  Positive, suggests immunologic exposure. U/mL Final       Imaging:  No results found for this or any previous visit (from the past 48 hour(s)).

## 2017-09-05 LAB
ERYTHROCYTE [DISTWIDTH] IN BLOOD BY AUTOMATED COUNT: 16.6 % (ref 10–15)
HCT VFR BLD AUTO: 37.9 % (ref 35–47)
HGB BLD-MCNC: 11.9 G/DL (ref 11.7–15.7)
MCH RBC QN AUTO: 27.4 PG (ref 26.5–33)
MCHC RBC AUTO-ENTMCNC: 31.4 G/DL (ref 31.5–36.5)
MCV RBC AUTO: 87 FL (ref 78–100)
PLATELET # BLD AUTO: 279 10E9/L (ref 150–450)
POTASSIUM SERPL-SCNC: 3.8 MMOL/L (ref 3.4–5.3)
RBC # BLD AUTO: 4.34 10E12/L (ref 3.8–5.2)
WBC # BLD AUTO: 5.6 10E9/L (ref 4–11)

## 2017-09-05 PROCEDURE — 36415 COLL VENOUS BLD VENIPUNCTURE: CPT | Performed by: SURGERY

## 2017-09-05 PROCEDURE — 25000131 ZZH RX MED GY IP 250 OP 636 PS 637: Mod: GY | Performed by: INTERNAL MEDICINE

## 2017-09-05 PROCEDURE — 25000132 ZZH RX MED GY IP 250 OP 250 PS 637: Mod: GY | Performed by: SURGERY

## 2017-09-05 PROCEDURE — 85027 COMPLETE CBC AUTOMATED: CPT | Performed by: SURGERY

## 2017-09-05 PROCEDURE — A9270 NON-COVERED ITEM OR SERVICE: HCPCS | Mod: GY | Performed by: INTERNAL MEDICINE

## 2017-09-05 PROCEDURE — 25000132 ZZH RX MED GY IP 250 OP 250 PS 637: Mod: GY | Performed by: PHYSICIAN ASSISTANT

## 2017-09-05 PROCEDURE — A9270 NON-COVERED ITEM OR SERVICE: HCPCS | Mod: GY | Performed by: STUDENT IN AN ORGANIZED HEALTH CARE EDUCATION/TRAINING PROGRAM

## 2017-09-05 PROCEDURE — 25000132 ZZH RX MED GY IP 250 OP 250 PS 637: Mod: GY | Performed by: INTERNAL MEDICINE

## 2017-09-05 PROCEDURE — A9270 NON-COVERED ITEM OR SERVICE: HCPCS | Mod: GY | Performed by: PHYSICIAN ASSISTANT

## 2017-09-05 PROCEDURE — 84132 ASSAY OF SERUM POTASSIUM: CPT | Performed by: SURGERY

## 2017-09-05 PROCEDURE — 25000128 H RX IP 250 OP 636: Performed by: PHYSICIAN ASSISTANT

## 2017-09-05 PROCEDURE — 25000125 ZZHC RX 250: Performed by: PHYSICIAN ASSISTANT

## 2017-09-05 PROCEDURE — 25000132 ZZH RX MED GY IP 250 OP 250 PS 637: Mod: GY | Performed by: STUDENT IN AN ORGANIZED HEALTH CARE EDUCATION/TRAINING PROGRAM

## 2017-09-05 PROCEDURE — A9270 NON-COVERED ITEM OR SERVICE: HCPCS | Mod: GY | Performed by: SURGERY

## 2017-09-05 PROCEDURE — 12000001 ZZH R&B MED SURG/OB UMMC

## 2017-09-05 PROCEDURE — 25000128 H RX IP 250 OP 636: Performed by: INTERNAL MEDICINE

## 2017-09-05 RX ORDER — CYCLOSPORINE 25 MG/1
75 CAPSULE, LIQUID FILLED ORAL
Status: DISCONTINUED | OUTPATIENT
Start: 2017-09-05 | End: 2017-09-08 | Stop reason: HOSPADM

## 2017-09-05 RX ORDER — MEROPENEM 1 G/1
1 INJECTION, POWDER, FOR SOLUTION INTRAVENOUS EVERY 6 HOURS
Status: COMPLETED | OUTPATIENT
Start: 2017-09-05 | End: 2017-09-05

## 2017-09-05 RX ADMIN — URSODIOL 250 MG: 250 TABLET ORAL at 08:24

## 2017-09-05 RX ADMIN — SENNOSIDES AND DOCUSATE SODIUM 1 TABLET: 8.6; 5 TABLET ORAL at 20:28

## 2017-09-05 RX ADMIN — METOPROLOL SUCCINATE 50 MG: 50 TABLET, EXTENDED RELEASE ORAL at 08:24

## 2017-09-05 RX ADMIN — APIXABAN 2.5 MG: 2.5 TABLET, FILM COATED ORAL at 20:28

## 2017-09-05 RX ADMIN — MEROPENEM 1 G: 1 INJECTION, POWDER, FOR SOLUTION INTRAVENOUS at 22:51

## 2017-09-05 RX ADMIN — LEVOTHYROXINE SODIUM 150 MCG: 150 TABLET ORAL at 08:24

## 2017-09-05 RX ADMIN — VORICONAZOLE 200 MG: 200 TABLET, FILM COATED ORAL at 20:28

## 2017-09-05 RX ADMIN — MEROPENEM 1 G: 1 INJECTION, POWDER, FOR SOLUTION INTRAVENOUS at 10:47

## 2017-09-05 RX ADMIN — PREDNISONE 5 MG: 5 TABLET ORAL at 08:24

## 2017-09-05 RX ADMIN — SENNOSIDES AND DOCUSATE SODIUM 1 TABLET: 8.6; 5 TABLET ORAL at 08:24

## 2017-09-05 RX ADMIN — CYCLOSPORINE 75 MG: 25 CAPSULE, LIQUID FILLED ORAL at 18:33

## 2017-09-05 RX ADMIN — Medication 12.5 MG: at 08:24

## 2017-09-05 RX ADMIN — MEROPENEM 1 G: 1 INJECTION, POWDER, FOR SOLUTION INTRAVENOUS at 16:48

## 2017-09-05 RX ADMIN — VORICONAZOLE 200 MG: 200 TABLET, FILM COATED ORAL at 08:24

## 2017-09-05 RX ADMIN — Medication 5 MG: at 20:28

## 2017-09-05 RX ADMIN — POTASSIUM CHLORIDE 20 MEQ: 1.5 POWDER, FOR SOLUTION ORAL at 09:11

## 2017-09-05 RX ADMIN — MEROPENEM 1 G: 1 INJECTION, POWDER, FOR SOLUTION INTRAVENOUS at 04:33

## 2017-09-05 RX ADMIN — URSODIOL 250 MG: 250 TABLET ORAL at 20:28

## 2017-09-05 NOTE — PROGRESS NOTES
[facility]  Transplant Infectious Disease Progress Note      Patient:  Luz Thompson, Date of birth 1950, Medical record number [id number]  Date of Visit:  09/05/2017         Assessment and Recommendations:   Recommendations:  - DC meropenem after last dose today.       Assessment:  Virginia is a 67 year old woman immunosuppressed s/p 2002 liver transplant with recurrent gram negative sepsis. She has known sigmoid diverticulosis. She gets episodes of defecation syncope.  Infectious Disease issues include:  - Rectal perforation with fever x1 meño after colonoscopy 8/31/2017. Eloquis and rapamune stopped to assist with wound healing. Bronchoscopy postponed so that any interior pressure created by coughing does not open perf any further. Given many allergies, she was started on meropenem to cover aerobes and anaerobes.  The fever now resolved. It was likely due to perforated viscus that may have resulted in transient translocation of colonic bacteria or due to local inflammatory process without infection resulted from the perforation and the local hematoma.   Given the lack of evidence of active infectious process, drug-drug interaction with bactrim and voriconazole, and multiple allergies; I feel that DCing ABx is reasonable, the patient can be DCed without ABx.   If the patient is going to be in the hospital for couple of more days we will take this opportunity to monitor off of ABx.   - Pulmonary Coccidiodes infection. Followup CT imaging shows just a little increased uptake in lung nodule on PET imaging. Bronchoscopy deferred due to rectal perforation. Will continue voriconazole PO (resumed on 9/4/2017).   It seems like the plan now is for CT-guided biopsy as OP.   I will defer the management of voriconazole to Dr. Montero.      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  - Self-triggered use of prn antibiotics due to  recurrent episodes of sepsis and cholangitis with bactrim. If not responding she usually presents to ED for ertapenem.   - Hx of bacterial superinfection of chronic venous stasis changes on the legs, 7/27/2016.  - Hx of recurrent E coli sepsis 8/13/2013, 6/10/2015.   - Hx of Klebsiella UTI, 7/18/16. She completed a 14-day course of macrobid.  - Hx of Haemophilus influenzae pneumonia in 9/2014.  - Hx of angular chelitis, hx of thrush extending from under her upper denture plate, and onychomycosis.   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   - PCP prophylaxis: Not needed, as most recent CD4 count was > 200.   - Serostatus: CMV seronegative, EBV seropositive on 8/15/13.  - Immunization status: Completed PCV13 and PPSV23 x2 with last dose in 5/2016.  - Gamma globulin status: Endogenously replete.  - Isolation status: Good hand hygience. When she is an inpatient, she needs to be in contact isolation for known VRE carriage.      Discussed with primary service.   Branden Meraz   Pager: [phone]  9/5/2017            Interim History:   No new complaints or events.   Still with some blood upon wiping the rectum after BM.   No fever or chills. No pain.         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 She has venous stasis changes of her legs, and she can get stasis dermatitis if she does not wear her compression stockings. This is especially problematic if the weather is very warm and for that reason she does not want to wear compression stockings. She received Rituxan therapy for EBV viremia on 8/31/2016. She had some side effects in the days following the dose of Rituxan, but otherwise would be able to tolerate it again in the future. In the [id number] winter season, she was diagnosed with coccidioidomycosis. There was a strong pulmonary component to the infection, and she is being treated with  voriconazole. She had a 7/31/2017 visit with pulmonary. There is a lung nodule in the lingula, and a slightly enlarging rounded nodular opacity. Differential includes developing granuloma from resolved infection, new or superimposed infection, malignancy. She then had a PET/CT which shows that the nodule is avid, so there is a plan to pursue a percutaneous lung biopsy shortly.       Since Virginia was last seen by me in clinic on 8/16/2017, she was in the procedure area today for colonscopy and bronchoscopy. There was a rectal perforation. CT imaging showed some air. I was called and we started meropenem. Pain is now finally controlled, she has been admitted. Overnight plan is to CT in 24-48 hours, and then based on that decide whether to proceed with surgery. It will take 3-5 days for the hole to self-seal in most patients. Rapa and Eloquis were d/c to assist in the healing process.    Transplants:  5/22/2002 (Liver), Postoperative day:  5585.  Coordinator Angelika Luo    Review of Systems:  As mentioned in the interim history otherwise negative by reviewing central and peripheral neurological systems, respiratory system, cardiac system, GI system,  system, musculoskeletal, skin, allergy, and lymphatics.            Current Medications & Allergies:       meropenem  1 g Intravenous Q6H     voriconazole  200 mg Oral Q12H LUZMARIA     metoprolol  50 mg Oral Daily     senna-docusate  1 tablet Oral BID     ezetimibe  10 mg Oral Every Other Day     levothyroxine  150 mcg Oral Once per day on Sun Tue Wed Thu Fri Sat     predniSONE  5 mg Oral Daily     ursodiol  250 mg Oral BID     hydrALAZINE  12.5 mg Oral Daily     levothyroxine  225 mcg Oral Q7 Days     ezetimibe  5 mg Oral Every Other Day       Infusions/Drips:       Allergies   Allergen Reactions     Fluconazole Hives and Itching     Azithromycin Itching     Benadryl [Diphenhydramine Hcl]      Insomnia      Cefpodoxime Itching     Cellcept Diarrhea      Ciprofloxacin Other (See Comments)     Insomnia, mood lability     Codeine      Psych disturbance     Diphenhydramine Other (See Comments)     Doxycycline      Lansoprazole Diarrhea     Levaquin [Levofloxacin] Other (See Comments)     Headache, hyperactivity     Lisinopril Cough     Methotrexate      Sores     Morphine Itching     Morphine Sulfate Itching     Mycophenolate Diarrhea     Pravastatin Muscle Pain (Myalgia)     Simvastatin Muscle Pain (Myalgia)     severe     Cephalexin Rash     Fever and skin burning     Penicillin G Itching and Rash     Tolectin [Nsaids] Rash     Tramadol Rash            Physical Exam:   Vitals were reviewed.  All vitals stable.  Patient Vitals for the past 24 hrs:   BP Temp Temp src Pulse Resp SpO2   09/05/17 0700 158/77 97.4  F (36.3  C) Oral 74 16 99 %   09/05/17 0226 147/69 97.5  F (36.4  C) Oral 64 16 98 %   09/04/17 2228 144/76 98.9  F (37.2  C) Oral 70 16 97 %   09/04/17 1508 135/63 97.4  F (36.3  C) Oral 71 16 97 %     Ranges for vital signs:  Temp:  [97.4  F (36.3  C)-98.9  F (37.2  C)] 97.4  F (36.3  C)  Pulse:  [64-74] 74  Resp:  [16] 16  BP: (135-158)/(63-77) 158/77  SpO2:  [97 %-99 %] 99 %  Vitals:    08/31/17 0717 09/02/17 1100   Weight: 84.8 kg (187 lb) 87 kg (191 lb 12.8 oz)       Physical Examination:  GENERAL:  well-developed, well-nourished, in bed in no acute distress.  ABDOMEN: soft, not tender, not distended, normal BS.            Laboratory Data:     Absolute CD4   Date Value Ref Range Status   08/19/2014 415 mm3 Final   09/16/2013 1266 mm3 Final   09/15/2013 DUPLICATE,TESTING DONE ON SPECIMEN FROM 9.16.13 mm3 Final   11/13/2008 1332 mm3 Final       Inflammatory Markers    Recent Labs   Lab Test  09/03/17   0912  09/02/17   0648  09/01/17   0832  05/14/16   0659  05/13/16   0940  09/23/14   0810  08/19/14   1530   06/30/14   0825  05/15/14   1112  05/08/14   0806   SED   --    --    --    --   67*  79*  76*   --   51*  58*  51*   CRP  64.0*  71.0*  51.0*  21.0*   19.0*  6.1  29.8*   < >  12.5*  8.0  <5.0    < > = values in this interval not displayed.       Immune Globulin Studies     Recent Labs   Lab Test  08/19/14   1530  05/21/12   0754   IGG  1220  1070   IGM   --   97   IGE  46   --    IGA   --   85   IGG1  684   --    IGG2  441   --    IGG3  70   --    IGG4  19   --        Metabolic Studies       Recent Labs   Lab Test  09/05/17   0736  09/04/17   0705  09/03/17   0703  09/02/17   0648   05/10/17   0929   05/13/16   2037  05/13/16   1723  05/13/16   0940   07/23/14   1119   02/19/14   0700   NA   --   145*   --   142   < >  138   < >   --   138  132*   < >  137   < >  141   POTASSIUM  3.8  3.8  3.7  3.8   < >  4.2   < >   --   3.9  3.6   < >  3.8   < >  4.7   CHLORIDE   --   112*   --   110*   < >  102   < >   --   104  96   < >  98   < >  111*   CO2   --   24   --   20   < >  26   < >   --   25  26   < >  27   < >  21   ANIONGAP   --   9   --   11   < >  10   < >   --   8  10   < >  12   < >  8   BUN   --   9   --   14   < >  36*   < >   --   25  29   < >  35*   < >  36*   CR   --   1.09*   --   1.14*   < >  1.53*   < >   --   1.55*  1.64*   < >  1.70*   < >  1.48*   GFRESTIMATED   --   50*   --   47*   < >  34*   < >   --   33*  31*   < >  30*   < >  36*   GLC   --   101*   --   49*   < >  104*   < >   --   89  98   < >  109*   < >  131*   KEILY   --   8.1*   --   7.7*   < >  9.7   < >   --   8.4*  9.1   < >  9.1   < >  8.3*   PHOS   --    --    --    --    --   3.1   < >   --    --    --    < >   --    < >   --    MAG   --    --   2.0   --    < >  2.8*   < >   --    --   2.2   < >   --    < >  2.4*   LACT   --    --    --    --    --    --    --    --    --   0.8   --   1.7   --    --    PCAL   --    --    --    --    --    --    --    --   0.38   --    --   0.21   --    --    FGTL   --    --    --    --    --    --    --   <31  Unit: pg/mL     --    --    < >   --    --    --    CKT   --    --    --    --    --    --    --    --    --   78   --    --    --   56    <  > = values in this interval not displayed.       Hepatic Studies    Recent Labs   Lab Test  09/04/17   0705  09/03/17   0703  09/02/17   0648   07/24/14   0814   09/15/13   1431   12/20/12   2110   BILITOTAL  0.2  0.4  0.3   < >  0.4   < >   --    < >  <0.1*   BILIDELTA   --    --    --    --   0.3   < >   --    < >  0.0   BILICONJ   --    --    --    --   0.0   < >   --    < >  0.0   DBIL  <0.1  <0.1  <0.1   < >   --    --    --    --    --    ALKPHOS  163*  189*  174*   < >  183*   < >   --    < >  93   PROTTOTAL  6.3*  7.1  6.2*   < >  6.6*   < >   --    < >  4.9*   ALBUMIN  2.3*  2.5*  2.2*   < >  3.4   < >   --    < >  2.4*   AST  24  20  26   < >  92*   < >   --    < >  25   ALT  26  32  37   < >  140*   < >   --    < >  31   LDH   --    --    --    --    --    --   603   --   621    < > = values in this interval not displayed.       Pancreatitis testing    Recent Labs   Lab Test  08/22/17   0905   07/23/14   1119   02/18/14   0852   11/06/13   1246   08/13/13   1046   LIPASE   --    --   32   --   55   --   59   --   52   TRIG  447*   < >   --    < >   --    < >   --    < >   --     < > = values in this interval not displayed.       Gout Labs      Recent Labs   Lab Test  12/31/13   1443  07/30/13   1441   URIC  6.7  6.7       Hematology Studies      Recent Labs   Lab Test  09/05/17   0736  09/04/17   0854  09/03/17   0912  09/02/17   0648  09/01/17   0832  08/31/17   1306   WBC  5.6  6.4  8.4  10.2  9.4  10.6   ANEU   --    --    --   7.4  6.5  8.6*   ALYM   --    --    --   1.7  1.7  1.3   KATHY   --    --    --   0.9  1.0  0.7   AEOS   --    --    --   0.2  0.1  0.0   HGB  11.9  10.6*  11.0*  10.5*  11.1*  12.4   HCT  37.9  33.7*  35.1  34.0*  35.0  38.5   PLT  279  244  232  259  269  290       Clotting Studies    Recent Labs   Lab Test  05/16/16   0827  05/13/16   0940  11/06/13   1246  10/14/13   0904   INR  1.02  1.11  0.96  0.98   PTT   --   33   --    --        Iron Testing    Recent Labs   Lab Test   09/05/17   0736   07/11/13   0814   04/18/13   0809  03/21/13   0812 12/22/12   1346   12/20/12 2005 12/07/12   1345   IRON   --    --   58   --   63  90   < >   --    --    --   48   FEB   --    --   285   --   315  285   < >   --    --    --   156*   IRONSAT   --    --   20   --   20  32   < >   --    --    --   30   LAURA   --    --   195   < >  232  202   < >   --    --    --   317*   MCV  87   < >  88   < >  92  98   < >  100   < >  103*   --    B12   --    --    --    --    --    --    --    --    --    --   281   HAPT   --    --    --    --    --    --    --    --    --   137   --    RETP   --    --    --    --    --    --    --   2.7*   --    --    --    RETICABSCT   --    --    --    --    --    --    --   71.0   --    --    --     < > = values in this interval not displayed.       Markers    Alpha Fetoprotein   Date Value Ref Range Status   03/26/2012 4.1 0 - 8 ug/L Final     Comment:     Reference ranges apply to non-pregnant females only.       Autoimmune Testing    Recent Labs   Lab Test  05/13/16   1723  08/19/14   1530   PRANAV  <1.0  Interpretation:  Negative    <1.0  Interpretation:  Negative     RHF   --   <20       Thyroid Studies     Recent Labs   Lab Test  07/13/17   0843  05/10/17   0929  07/18/16   1430  05/24/16   0846  07/20/15   1010   09/23/14   0810   05/08/14   0806   TSH  3.66  4.74*  2.43  3.65  3.41   < >  2.87   < >  4.41   T4  1.59*  1.48*  1.38  1.19  1.24   < >  1.29  9.1   --   1.19  9.1   T3   --    --    --    --    --    --   65   --   48*    < > = values in this interval not displayed.       Urine Studies     Recent Labs   Lab Test  08/31/17   2100  08/22/17   0913  07/18/16   1503  05/13/16   1114  04/30/15   0951   08/19/14   1410   URINEPH  8.0*  7.0  5.5  6.5  6.0   < >  7.0   NITRITE  Negative  Negative  Negative  Negative  Negative   < >  Negative   LEUKEST  Small*  Trace*  Large*  Moderate*  Negative   < >  Negative   WBCU  2  2-5*  10-25*  4*   --    --   <1    < >  = values in this interval not displayed.       Medication levels    Recent Labs   Lab Test  07/13/17   0843   09/15/13   0435   12/21/12   1344   VANCOMYCIN   --    --   26.3   --    --    RAPAMY  5.8   < >   --    < >   --    MPACID   --    --    --    --   5.42*   MPAG   --    --    --    --   83.5    < > = values in this interval not displayed.       Microbiology:  Cryptococcal antigen    Recent Labs   Lab Test  08/14/13   0822   CRPTT  Negative for cryptococcal antigen A negative cryptococcal antigen test does not exclude cryptococcal infection. If  a fungal culture is desired, it must be ordered separately.       Beta D Glucan levels (Fungitell assay)    Recent Labs   Lab Test  05/13/16   2037  08/19/14   1530   FGTL  <31  Unit: pg/mL    <31  Unit: pg/mL         Last Culture results with specimen source  Culture Micro   Date Value Ref Range Status   07/18/2016 (A)  Final    50,000 to 100,000 colonies/mL Klebsiella pneumoniae   05/21/2016 No growth  Final   05/21/2016 No growth  Final   05/16/2016 Canceled, Test credited Duplicate request  Final   05/16/2016 Canceled, Test credited Duplicate request  Final   05/16/2016 No growth  Final   05/16/2016 No growth  Final   05/13/2016 No growth after 29 days  Final   05/13/2016   Final    Canceled, Test credited Quantity not sufficient Notification of test cancellation   was given to MATTHEW FROM [facility], HE WILL REORDER AND RECOLLECT     05/13/2016 No growth  Final   05/13/2016 No growth  Final   05/13/2016   Final    10,000 to 50,000 colonies/mL mixed urogenital louann  Susceptibility testing not routinely done     05/13/2016 No growth  Final   09/25/2014 (A)  Final    Normal louann  Moderate growth Haemophilus influenzae Beta lactamase negative     09/03/2014 (A)  Final    Normal louann  Heavy growth Haemophilus influenzae Beta lactamase negative  beta lactamase negative isolates are susceptible to ampicillin,   amoxacillin/clavulanic, levofloxacin and ceftriaxone      08/19/2014 No growth  Final   08/19/2014 No growth  Final   07/24/2014 No growth  Final   07/24/2014 No growth  Final    Specimen Description   Date Value Ref Range Status   07/18/2016 Midstream Urine  Final   05/21/2016 Blood Right Arm  Final   05/21/2016 Blood Left Arm  Final   05/16/2016 Blood  Final   05/16/2016 Blood  Final   05/16/2016 Blood Left Arm  Final   05/16/2016 Blood Right Arm  Final   05/13/2016 Blood Unspecified Site  Final   05/13/2016 Blood Unspecified Site  Final   05/13/2016 Blood Right Arm  Final   05/13/2016 Blood Right Arm  Final   05/13/2016 Midstream Urine  Final   05/13/2016 Nasal  Final   05/13/2016 Blood Venipuncture Collection VPT  Final   09/25/2014 Sputum  Final   09/25/2014 Sputum  Final   09/03/2014 Sputum  Final   09/03/2014 Sputum  Final   08/19/2014 Blood Right Arm  Final        Last check of C difficile  C Diff Toxin B PCR   Date Value Ref Range Status   05/17/2012   Final    Negative: Clostridium difficile target DNA sequences NOT detected, presumed   negative for Clostridium difficile toxin B or the number of bacteria present   may be below the limit of detection for the test.   FDA approved assay performed using "[employer] GeneXpert real-time PCR.   A negative result does not exclude actual disease due to Clostridium difficile   and may be due to improper collection, handling and storage of the specimen or   the number of organisms in the specimen is below the detection limit of the   assay.       Infectious Disease Testing     Recent Labs   Lab Test  08/19/14   1529   TBRSLT  Negative       Virology:  EBV DNA Copies/mL   Date Value Ref Range Status   08/22/2017 EBV DNA Not Detected EBVNEG^EBV DNA Not Detected [Copies]/mL Final   04/11/2017 (A) EBVNEG [Copies]/mL Final    <500  EBV DNA Detected below the reportable range of 500 Copies/mL     12/09/2016 1219 (A) EBVNEG [Copies]/mL Final   08/08/2016 25303 (A) EBVNEG [Copies]/mL Final   07/26/2016 37852 (A) EBVNEG [Copies]/mL  Final   05/24/2016 18542 (A) EBVNEG [Copies]/mL Final       BK Virus Testing     BK Virus Result   Date Value Ref Range Status   07/28/2011 <1000 <1000 copies/mL Final       BK Virus Testing     Recent Labs   Lab Test  07/28/11   1150   BKSPEC  Urine   BKRES  <1000   BKLOG  <3.0 The lower limit of detection for this assay is 1000 copies/mL.  Real-time TaqMan  PCR was performed using BK primers and probe for the detection of a 90 bp  portion of the  1 gene.  The performance characteristics were validated by  the [facility], [place].  It has not been cleared or approved by the U.S. Food and Drug Administration.       Hepatitis B Testing     Recent Labs   Lab Test  05/13/16   1723  03/26/12   0856   AUSAB  0.09   --    HBSAB   --   0.0   HBCAB  Nonreactive  Negative   HEPBANG  Nonreactive  Negative        Hepatitis C Antibody   Date Value Ref Range Status   05/13/2016  NR Final    Nonreactive   Assay performance characteristics have not been established for newborns,   infants, and children     03/26/2012 Negative NEG Final       CMV Antibody IgG   Date Value Ref Range Status   05/13/2016  0.0 - 0.8 AI Final    <0.2  Negative   Antibody index (AI) values reflect qualitative changes in antibody   concentration that cannot be directly associated with clinical condition or   disease state.       CMV IgG Antibody   Date Value Ref Range Status   08/15/2013 <0.20  Negative for anti-CMV IgG U/mL Final     EBV Capsid Antibody IgG   Date Value Ref Range Status   05/13/2016 (H) 0.0 - 0.8 AI Final    >8.0  Positive, suggests recent or past exposure   Antibody index (AI) values reflect qualitative changes in antibody   concentration that cannot be directly associated with clinical condition or   disease state.       EBV VCA IgG Antibody   Date Value Ref Range Status   08/15/2013 >750.00  Positive, suggests immunologic exposure. U/mL Final       Imaging:  No results found for this or any previous  visit (from the past 48 hour(s)).

## 2017-09-05 NOTE — PLAN OF CARE
Problem: Goal Outcome Summary  Goal: Goal Outcome Summary  Outcome: Improving  Pt AVSS. Pt sleeping between cares. Abd obese, soft, +bs, +gas. Pain negligible-pt has not needed anything for pain tonite. Up to BR gait steady. Voiding good amts. Denied any BM. Stated that there was just a smear of blood on toilet paper after wiping. IV antibiotics cont as ordered. Cont to monitor bowel fxn. Maintain pain control. Promote rest.

## 2017-09-05 NOTE — PROGRESS NOTES
CRS update    - d/w infectious disease, continue meropenem thru tonight, then stop.  No abx at discharge for now.  If remains in hospital, monitor off Abx.   - d/w cardiology - restart Eliquis when safe from bleeding standpoint.  Will start Eliquis tonight at 8pm.   - d/w Hepatology - will start Cyclosporin tonight with pharmacy to review current medications.   - appreciate Pharmacy consult to review meds, potential interactions and to help initiate Cyclosporin dosing.  Will need a level check on Friday morning.  Is to start cyclosporin tonight.    - pt feeling a bit off today.  Still grieving loss of her mother and ex- as it has been stressful for both herself and daughter.      Mariola Orozco PA-C  Discussed with colorectal team.

## 2017-09-05 NOTE — PHARMACY-CONSULT NOTE
Pharmacy was consulted to assist with dosing cyclosporine (CSA) in Luz Thompson.  ABW 87 kg  IBW 62.8 kg    Team is transitioning pt from siroliumus 0.5 mg PO every other day. Her last dose of this was prior to admission on 2017. Immune suppression is for a liver transplant in 2002. The patient is also on prednisone 5 mg daily.    Will start cyclosporine (generic) 75 mg PO twice a day. This is a lower starting dose than normal given the interaction with voriconazole (vori increases the CSA level). Cyclosporine may also increase ezetimibe 3 fold, so ezetimibe will be changed from 5mg alternating with 10mg to just 5mg every evening. GI attending Dr. YANCY Montano was paged this information.    GI Transplant team to say specifically the CSA goal level range (I suspect somewhere in the  ug/L range). Would anticipate checking first level on Thursday or Friday. Would also recommend obtaining a sirolimus level to make sure it is being eliminated.    Suzan Rodriguez, PharmD  [phone] (text capable)

## 2017-09-05 NOTE — PLAN OF CARE
"Problem: Goal Outcome Summary  Goal: Goal Outcome Summary  Outcome: No Change  /77 (BP Location: Right arm)  Pulse 74  Temp 97.4  F (36.3  C) (Oral)  Resp 16  Ht 1.715 m (5' 7.5\")  Wt 87 kg (191 lb 12.8 oz)  SpO2 99%  BMI 29.6 kg/m2     Denies pain. Denies nausea tolerating regular diet. PIV saline locked. Replaced potassium. Pt had one BM today with no blood. Voiding spont, not saving. Up ad ashley. Pt would like to shower sometime today. Plan is to d/c tomorrow unless her home eliquis starts tomorrow then she would stay a few more days.     Addendum: temp 99.1 around 1445  "

## 2017-09-05 NOTE — PLAN OF CARE
Problem: Goal Outcome Summary  Goal: Goal Outcome Summary  Outcome: Improving  Assumed care of patient from [id number]  AV. Pain managed with PRN Tylenol. Denies nausea. LPIV saline locked except for IV merrem. Passing gas and having bowel movements, barrier cream given for tenderness upon passing BM's. Streaks of blood in stool improving. Voiding with good outputs. Tolerating regular diet. UAL. Due to d/c in the next day or two possibly. Continue with plan.

## 2017-09-05 NOTE — PROGRESS NOTES
Colorectal Surgery Progress Note      Subjective:  Had BM yesterday.  Tolerating diet.  Pain overall much improved.  Feeling better overall.  However would like to ensure that bowels are consistently working.  Had a little blood per rectum, on tissue paper when wiping.     Vitals:  Vitals:    09/04/17 1508 09/04/17 2228 09/05/17 0226 09/05/17 0700   BP: 135/63 144/76 147/69 158/77   BP Location: Right arm Right arm  Right arm   Pulse: 71 70 64 74   Resp: 16 16 16 16   Temp: 97.4  F (36.3  C) 98.9  F (37.2  C) 97.5  F (36.4  C) 97.4  F (36.3  C)   TempSrc: Oral Oral Oral Oral   SpO2: 97% 97% 98% 99%   Weight:       Height:         I/O:  I/O last 3 completed shifts:  In: 1070 [P.O.:970; I.V.:100]  Out: 750 [Urine:750]    Physical Exam:  Gen: AAOx3, NAD  Pulm: Non-labored breathing  Abd: Soft, non-distended, minimally tender, no guarding/rebound  Ext:  Warm and well-perfused    BMP  Recent Labs  Lab 09/04/17  0705 09/03/17  0703 09/02/17  0648 09/01/17  0832 08/31/17  1306   *  --  142 142 138   POTASSIUM 3.8 3.7 3.8 3.4 3.9   CHLORIDE 112*  --  110* 110* 101   CO2 24  --  20 27 32   BUN 9  --  14 17 22   CR 1.09*  --  1.14* 1.39* 1.17*   *  --  49* 95 128*   MAG  --  2.0  --   --   --      CBC  Recent Labs  Lab 09/05/17  0736 09/04/17  0854 09/03/17  0912 09/02/17  0648   WBC 5.6 6.4 8.4 10.2   HGB 11.9 10.6* 11.0* 10.5*   HCT 37.9 33.7* 35.1 34.0*    244 232 259         ASSESSMENT: 66 y/o female s/p prior Liver transplant in 2002 for PBC followed by Dr. 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  Pt underwent outpt screening colonoscopy on 8/31, c/b a small 6mm perforation in the rectum and had 3 clips placed.  CT scan showed new surgical clips in the rectum with small adjacent hematoma, small volume air on the right extending to  mesorectal fascia.  Repeat CT scan on 9/1 shows mild increasing in size of perirectal hematoma.  Decreased free air and no new foci of free air seen.     Neuro/Pain: controlled with tylenol 1000mg BID.   CV: metoprolol, hydralazine, zetia.  Continue to hold home lasix for now.   - holding eliquis.  apprec Cards recs on Eliquis.   PULM: encourage IS.  GI/FEN: regular diet. Home benefiber.  On senna-docusate.   - apprec Hepatology management.  On pred 5mg daily.  Holding rapamune and urosodiol.  Will clarify final immunosuppression regimen for discharge.   : PAVEL  Heme:  Hgb stable.   ID: apprec ID recs.  On missy.  Will clarify final recommendations for discharge. On home PO vori.   Endocrine: synthroid.   Activity: as tolerated.  Ppx: will need to discuss when to restart eliquis. Possibly restart today to monitor for bleeding?  Dispo: possibly tomorrow if continues to do well.      Mariola Orozco PA-C   Colon and Rectal Surgery  [id number]     Patient was seen and discussed with Dr. Neely.

## 2017-09-05 NOTE — PROGRESS NOTES
Care Coordinator Progress Note     Admission Date/Time:  8/31/2017  Attending MD:  Yuridia Frost *     Data  Chart reviewed, discussed with interdisciplinary team.   Patient was admitted for: Perforated rectum secondary to colonoscopy    Concerns with insurance coverage for discharge needs: None.  Current Living Situation: Patient lives with spouse.  Support System: Supportive  Services Involved: None  Transportation: Spouse or daughter will provide  Barriers to Discharge: Pending decsion when to restart anticoagulant    Coordination of Care and Referrals: Provided patient/family with options for N/A.        Assessment  This CC met with patient at bedside. Discussed discharge plans. Patient states she is not going to go home on  IV abx and most likely will not go on any PO meds as she is allergic to almost all PO abx. Patient states that she is waiting to hear back on when she will restart her Eliquis, if she restarts today she will remain in acute setting to monitor her bleeding for 24-48 hours, if cardiology say's she can wait another week to restart she will  discharge tomorrow to her daughters home, daughter will transport as her  will be out of town. Denied any further concerns regarding discharge. Patient is status post liver transplant 2002 and had questions about restarting her Rapamune (this had been on hold as this is a immunosuppressant and impedes healing). Directed to discuss with her physician when he rounds.     Plan  Anticipated Discharge Date:  2-3 days  Anticipated Discharge Plan:  Home to daughters. No needs identified    Lacey LANGFORD CCM

## 2017-09-05 NOTE — PROGRESS NOTES
Cardiology was consulted for advice managing NOAC (given history of afib with [id number] 5) as patient did want to discuss with Dr. Gilmore, her cardiologist. We were able to speak over the phone with Dr. Gilmore and are in agreement that she can resume her NOAC when it is deemed appropriate from a surgical perspective. No other changes to her cares.      Torsten Ingram PA-C  [facility] Cardiology Consult Team  Pager [phone] or [phone]

## 2017-09-06 LAB
MAGNESIUM SERPL-MCNC: 2 MG/DL (ref 1.6–2.3)
POTASSIUM SERPL-SCNC: 4 MMOL/L (ref 3.4–5.3)

## 2017-09-06 PROCEDURE — 25000132 ZZH RX MED GY IP 250 OP 250 PS 637: Mod: GY | Performed by: INTERNAL MEDICINE

## 2017-09-06 PROCEDURE — 25000132 ZZH RX MED GY IP 250 OP 250 PS 637: Mod: GY | Performed by: SURGERY

## 2017-09-06 PROCEDURE — 83735 ASSAY OF MAGNESIUM: CPT | Performed by: COLON & RECTAL SURGERY

## 2017-09-06 PROCEDURE — A9270 NON-COVERED ITEM OR SERVICE: HCPCS | Mod: GY | Performed by: INTERNAL MEDICINE

## 2017-09-06 PROCEDURE — 25000132 ZZH RX MED GY IP 250 OP 250 PS 637: Mod: GY | Performed by: STUDENT IN AN ORGANIZED HEALTH CARE EDUCATION/TRAINING PROGRAM

## 2017-09-06 PROCEDURE — 84132 ASSAY OF SERUM POTASSIUM: CPT | Performed by: COLON & RECTAL SURGERY

## 2017-09-06 PROCEDURE — 12000001 ZZH R&B MED SURG/OB UMMC

## 2017-09-06 PROCEDURE — 25000125 ZZHC RX 250: Performed by: PHYSICIAN ASSISTANT

## 2017-09-06 PROCEDURE — 25000131 ZZH RX MED GY IP 250 OP 636 PS 637: Mod: GY | Performed by: INTERNAL MEDICINE

## 2017-09-06 PROCEDURE — 25000132 ZZH RX MED GY IP 250 OP 250 PS 637: Mod: GY | Performed by: PHYSICIAN ASSISTANT

## 2017-09-06 PROCEDURE — A9270 NON-COVERED ITEM OR SERVICE: HCPCS | Mod: GY | Performed by: SURGERY

## 2017-09-06 PROCEDURE — A9270 NON-COVERED ITEM OR SERVICE: HCPCS | Mod: GY | Performed by: STUDENT IN AN ORGANIZED HEALTH CARE EDUCATION/TRAINING PROGRAM

## 2017-09-06 PROCEDURE — 36415 COLL VENOUS BLD VENIPUNCTURE: CPT | Performed by: COLON & RECTAL SURGERY

## 2017-09-06 PROCEDURE — A9270 NON-COVERED ITEM OR SERVICE: HCPCS | Mod: GY | Performed by: PHYSICIAN ASSISTANT

## 2017-09-06 RX ADMIN — VORICONAZOLE 200 MG: 200 TABLET, FILM COATED ORAL at 20:30

## 2017-09-06 RX ADMIN — Medication 12.5 MG: at 08:33

## 2017-09-06 RX ADMIN — PREDNISONE 5 MG: 5 TABLET ORAL at 08:33

## 2017-09-06 RX ADMIN — URSODIOL 250 MG: 250 TABLET ORAL at 08:33

## 2017-09-06 RX ADMIN — METOPROLOL SUCCINATE 50 MG: 50 TABLET, EXTENDED RELEASE ORAL at 08:33

## 2017-09-06 RX ADMIN — CYCLOSPORINE 75 MG: 25 CAPSULE, LIQUID FILLED ORAL at 20:30

## 2017-09-06 RX ADMIN — APIXABAN 2.5 MG: 2.5 TABLET, FILM COATED ORAL at 08:33

## 2017-09-06 RX ADMIN — SENNOSIDES AND DOCUSATE SODIUM 1 TABLET: 8.6; 5 TABLET ORAL at 08:33

## 2017-09-06 RX ADMIN — VORICONAZOLE 200 MG: 200 TABLET, FILM COATED ORAL at 08:33

## 2017-09-06 RX ADMIN — ACETAMINOPHEN 1000 MG: 500 TABLET, FILM COATED ORAL at 22:29

## 2017-09-06 RX ADMIN — URSODIOL 250 MG: 250 TABLET ORAL at 20:31

## 2017-09-06 RX ADMIN — LEVOTHYROXINE SODIUM 150 MCG: 150 TABLET ORAL at 08:33

## 2017-09-06 RX ADMIN — CYCLOSPORINE 75 MG: 25 CAPSULE, LIQUID FILLED ORAL at 08:34

## 2017-09-06 RX ADMIN — Medication 5 MG: at 20:30

## 2017-09-06 RX ADMIN — SENNOSIDES AND DOCUSATE SODIUM 1 TABLET: 8.6; 5 TABLET ORAL at 20:31

## 2017-09-06 NOTE — PLAN OF CARE
Problem: Goal Outcome Summary  Goal: Goal Outcome Summary  Outcome: Improving  UAL, denies pain when asked. Tolerating regular diet. Had 1 soft BM this shift. Stool is brown in color with small streak of blood. Voiding adequately, not saving. Last dose of IV merrem at 2300H. PIV-SL. OVSS, afebrile. Using IS. PLAN: monitor for any signs of increase rectal bleeding.     ADDENDUM: Pt had another BM late this evening, consistency is soft and no blood noted from the stool or rectum.

## 2017-09-06 NOTE — PROGRESS NOTES
[facility]    Hepatology Follow-up    CC:                Rectal perforation     Dx:                PBC S/p liver transplant                         Rectal perforation     24 hour events:                   No acute events     Subjective: Patient tolerating diet, normal BM's, no abdominal pain, mild rectal discomfort with BM's        Patient denies fevers, sweats or chills.    Medications  Current Facility-Administered Medications   Medication     apixaban ANTICOAGULANT (ELIQUIS) tablet 2.5 mg     ezetimibe (ZETIA) half-tab 5 mg     cycloSPORINE modified (GENERIC EQUIVALENT) capsule 75 mg     acetaminophen (TYLENOL) tablet 1,000 mg     potassium chloride SA (K-DUR/KLOR-CON M) CR tablet 20-40 mEq     potassium chloride (KLOR-CON) Packet 20-40 mEq     potassium chloride 10 mEq in 100 mL intermittent infusion     potassium chloride 10 mEq in 100 mL intermittent infusion with 10 mg lidocaine     potassium chloride 20 mEq in 100 mL NON-STANDARD CONC intermittent infusion     magnesium sulfate 2 g in NS intermittent infusion (PharMEDium or FV Cmpd)     magnesium sulfate 4 g in 100 mL sterile water (premade)     voriconazole (VFEND) tablet 200 mg     dextrose 50 % injection 25 mL     metoprolol (TOPROL-XL) 24 hr tablet 50 mg     senna-docusate (SENOKOT-S;PERICOLACE) 8.6-50 MG per tablet 1 tablet     HYDROmorphone (PF) (DILAUDID) injection 0.3-0.5 mg     naloxone (NARCAN) injection 0.1-0.4 mg     levothyroxine (SYNTHROID/LEVOTHROID) tablet 150 mcg     predniSONE (DELTASONE) tablet 5 mg     SUMAtriptan (IMITREX) tablet 50 mg     ursodiol (ACTIGALL) tablet 250 mg     hydrALAZINE (APRESOLINE) half-tab 12.5 mg     levothyroxine (SYNTHROID/LEVOTHROID) tablet 225 mcg     ondansetron (ZOFRAN-ODT) ODT tab 4 mg    Or     ondansetron (ZOFRAN) injection 4 mg     prochlorperazine (COMPAZINE) injection 5 mg    Or     prochlorperazine (COMPAZINE) tablet 5 mg    Or     prochlorperazine (COMPAZINE) Suppository  "12.5 mg       Review of systems  A 10-point review of systems was negative.    Examination  /61 (BP Location: Right arm)  Pulse 71  Temp 98.5  F (36.9  C) (Oral)  Resp 16  Ht 1.715 m (5' 7.5\")  Wt 87 kg (191 lb 12.8 oz)  SpO2 96%  BMI 29.6 kg/m2    Intake/Output Summary (Last 24 hours) at 09/06/17 1858  Last data filed at 09/06/17 1715   Gross per 24 hour   Intake             2040 ml   Output                0 ml   Net             2040 ml       Gen- well, NAD, A+Ox3, normal color  CVS- RRR  RS- CTA  Abd- soft, nontender  Extr- WWP  Neuro-A& O x3  Skin- no rash  Psych- normal mood  Lym- no LAD    Laboratory  Lab Results   Component Value Date     09/04/2017    POTASSIUM 4.0 09/06/2017    CHLORIDE 112 09/04/2017    CO2 24 09/04/2017    BUN 9 09/04/2017    CR 1.09 09/04/2017       Lab Results   Component Value Date    BILITOTAL 0.2 09/04/2017    ALT 26 09/04/2017    AST 24 09/04/2017    ALKPHOS 163 09/04/2017       Lab Results   Component Value Date    WBC 5.6 09/05/2017    HGB 11.9 09/05/2017    MCV 87 09/05/2017     09/05/2017       Lab Results   Component Value Date    INR 1.02 05/16/2016         Radiology  reviewed     Assessment  67 year old woman with PBC s/p OLT liver transplant (2002) on prednisone and sirolimus, Sjogren's syndrome, who underwent screening colonoscopy complicated by rectal perforation on 8/31/17.  Intially mild fevers, no white count, small hematoma (3 cm in greatest dimension) who has since been afebrile and continues to improve with conservative management.      Recommendations  -antibiotics per primary  -cyclosporine started in place of sirolimus on 9/5/17  -continue prednisone 5 mg daily  -cyclosporine level to be completed Friday 9/8 (inpatient or outpatient)  -daily BMP, CBC  -defer to surgery on perforation management.        Curly Woods MD  Hepatology  ATTENDING NOTE, GASTROENTEROLOGY/HEPATOLOGY    I saw and discussed this patient with the fellow and " participated in the decision making. I agree with the fellow's note. Eneida Montano MD

## 2017-09-06 NOTE — PLAN OF CARE
Problem: Goal Outcome Summary  Goal: Goal Outcome Summary  Outcome: Improving  Patient denies pain, declines interventions. Tolerating regular diet. Reports passing flatus, last BM 9/5. Pt denies drainage/blood per rectum. Voiding with adequate output, not always saving. Up ad ashley, ambulating frequently. Possible d/c 9/7-9/8 pending pharmacy eval with new anticoagulant and anti-rejection meds.

## 2017-09-06 NOTE — PLAN OF CARE
Problem: Goal Outcome Summary  Goal: Goal Outcome Summary  Outcome: Improving  Patient sleeping between cares overnight.  Denies pain.  Up independently, only needs assistance removing/applying her PCDs.  Voiding, not saving urine.  Last BM was yesterday evening.  Tolerating regular diet without nausea.  PIV saline-locked.     Continue to monitor for blood in stool.

## 2017-09-06 NOTE — PROGRESS NOTES
"Subjective: No acute issues overnight. Pain well controlled. Denies fevers, chills, CP, SOB, and N/V. Tolerating regular diet. Voiding to toilet. Passing flatus, passing BM.    Objective:   /71  Pulse 71  Temp 97.1  F (36.2  C) (Oral)  Resp 16  Ht 1.715 m (5' 7.5\")  Wt 87 kg (191 lb 12.8 oz)  SpO2 100%  BMI 29.6 kg/m2    PE:  Gen: Awake, alert, NAD   CV: RRR  Resp: non-labored at rest  Abd: Soft, non-distended, NTTP  Ext: warm and well perfused    I/O last 3 completed shifts:  In: 2060 [P.O.:1860; I.V.:200]  Out: -  - Last 24 hours      Labs/Imaging  Heme:  Recent Labs  Lab 09/05/17  0736 09/04/17  0854 09/03/17  0912 09/02/17  0648   WBC 5.6 6.4 8.4 10.2   HGB 11.9 10.6* 11.0* 10.5*    244 232 259     Chem:  Recent Labs  Lab 09/05/17  0736 09/04/17  0705 09/03/17  0703 09/02/17  0648 09/01/17  0832 08/31/17  1306   POTASSIUM 3.8 3.8 3.7 3.8 3.4 3.9   CR  --  1.09*  --  1.14* 1.39* 1.17*         A/P: 68 y/o female s/p prior Liver transplant in 2002 for PBC followed by norbert Eddy on eliquis, h/o DVT, CKD (most recent creat baseline 1.2-1.5), HTN, HLD, h/o recurrent e.coli sepsis (8/2013, 6/2015), 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  Pt underwent outpt screening colonoscopy on 8/31, c/b a small 6mm perforation in the rectum and had 3 clips placed.  CT scan showed new surgical clips in the rectum with small adjacent hematoma, small volume air on the right extending to mesorectal fascia.  Repeat CT scan on 9/1 shows mild increasing in size of perirectal hematoma.  Decreased free air and no new foci of free air seen.     Doing well. Hemodynamically stable. Awaiting assessment 24-48 h s/p restart of eliquist.    NEURO Pain well controlled on  Tylenol 1g BID.  Changes:  None    CV HDS.    PULM Ambulate; I/S   FEN/GI   Continue current diet , home benefiber. Senna-docusate.    No acute issues, good UOP    HEME Hgb as above. Stable.   ID Afebrile, no " leukocytosis.    Antibiotics: Home variconazole    ENDO No issues   ACTIVITY Up as tolerated  Ambulate at least TID    PPx Ambulate. On home eliquis.   DISPO Anticipated d/c to home in next 1-2 days     Patient seen and discussed with the Fellow.    Audrey Sparrow, PGY-1  General Surgery

## 2017-09-06 NOTE — PROGRESS NOTES
[facility]    Hepatology Follow-up    CC: Rectal perforation    Dx: PBC S/p liver transplant    Rectal perforation    24 hour events: No acute events    Subjective: Patient tolerating diet, normal BM's, no abdominal pain, mild rectal discomfort with BM's.          Patient denies fevers, sweats or chills.    Medications  Current Facility-Administered Medications   Medication     meropenem (MERREM) 1 g vial to attach to  mL bag     apixaban ANTICOAGULANT (ELIQUIS) tablet 2.5 mg     ezetimibe (ZETIA) half-tab 5 mg     cycloSPORINE modified (GENERIC EQUIVALENT) capsule 75 mg     acetaminophen (TYLENOL) tablet 1,000 mg     potassium chloride SA (K-DUR/KLOR-CON M) CR tablet 20-40 mEq     potassium chloride (KLOR-CON) Packet 20-40 mEq     potassium chloride 10 mEq in 100 mL intermittent infusion     potassium chloride 10 mEq in 100 mL intermittent infusion with 10 mg lidocaine     potassium chloride 20 mEq in 100 mL NON-STANDARD CONC intermittent infusion     magnesium sulfate 2 g in NS intermittent infusion (PharMEDium or FV Cmpd)     magnesium sulfate 4 g in 100 mL sterile water (premade)     voriconazole (VFEND) tablet 200 mg     dextrose 50 % injection 25 mL     metoprolol (TOPROL-XL) 24 hr tablet 50 mg     senna-docusate (SENOKOT-S;PERICOLACE) 8.6-50 MG per tablet 1 tablet     potassium chloride 10 mEq in 100 mL intermittent infusion     potassium chloride 10 mEq in 100 mL intermittent infusion with 10 mg lidocaine     HYDROmorphone (PF) (DILAUDID) injection 0.3-0.5 mg     naloxone (NARCAN) injection 0.1-0.4 mg     levothyroxine (SYNTHROID/LEVOTHROID) tablet 150 mcg     predniSONE (DELTASONE) tablet 5 mg     SUMAtriptan (IMITREX) tablet 50 mg     ursodiol (ACTIGALL) tablet 250 mg     hydrALAZINE (APRESOLINE) half-tab 12.5 mg     levothyroxine (SYNTHROID/LEVOTHROID) tablet 225 mcg     ondansetron (ZOFRAN-ODT) ODT tab 4 mg    Or     ondansetron (ZOFRAN) injection 4 mg      "prochlorperazine (COMPAZINE) injection 5 mg    Or     prochlorperazine (COMPAZINE) tablet 5 mg    Or     prochlorperazine (COMPAZINE) Suppository 12.5 mg       Review of systems  A 10-point review of systems was negative.    Examination  /61  Pulse 73  Temp 97.1  F (36.2  C) (Oral)  Resp 16  Ht 1.715 m (5' 7.5\")  Wt 87 kg (191 lb 12.8 oz)  SpO2 95%  BMI 29.6 kg/m2    Intake/Output Summary (Last 24 hours) at 09/05/17 2101  Last data filed at 09/05/17 1900   Gross per 24 hour   Intake             2060 ml   Output                0 ml   Net             2060 ml       Gen- well, NAD,  normal color  CVS- RRR  RS- CTA  Abd- soft, nontender  Extr-WWP  Neuro-A+Ox3,  Skin- no rash  Psych- normal mood  Lym- no LAD    Laboratory  Lab Results   Component Value Date     09/04/2017    POTASSIUM 3.8 09/05/2017    CHLORIDE 112 09/04/2017    CO2 24 09/04/2017    BUN 9 09/04/2017    CR 1.09 09/04/2017       Lab Results   Component Value Date    BILITOTAL 0.2 09/04/2017    ALT 26 09/04/2017    AST 24 09/04/2017    ALKPHOS 163 09/04/2017       Lab Results   Component Value Date    WBC 5.6 09/05/2017    HGB 11.9 09/05/2017    MCV 87 09/05/2017     09/05/2017       Lab Results   Component Value Date    INR 1.02 05/16/2016         Radiology  reviewed    Assessment  67 year old woman with PBC s/p OLT liver transplant (2002) on prednisone and sirolimus, Sjogren's syndrome, who underwent screening colonoscopy complicated by rectal perforation on 8/31/17.  Intially mild fevers, no white count, small hematoma (3 cm in greatest dimension) who has since been afebrile and continues to improve with conservative management.     Recommendations  -antibiotics per primary  -will restart patient on cyclosporine in place of sirolimus and continue prednisone 5 mg daily  -cyclosporine level to be completed Friday 9/8 (inpatient or outpatient)  -daily BMP, CBC  -defer to surgery on perforation management.      Curly Woods, " MD  Hepatology    ATTENDING NOTE, GASTROENTEROLOGY/HEPATOLOGY    I saw and discussed this patient with the fellow and participated in the decision making. I agree with the fellow's note. Eneida Montano MD

## 2017-09-07 DIAGNOSIS — Z94.4 LIVER REPLACED BY TRANSPLANT (H): Primary | ICD-10-CM

## 2017-09-07 LAB
ERYTHROCYTE [DISTWIDTH] IN BLOOD BY AUTOMATED COUNT: 16.4 % (ref 10–15)
HCT VFR BLD AUTO: 37.2 % (ref 35–47)
HGB BLD-MCNC: 11.9 G/DL (ref 11.7–15.7)
MCH RBC QN AUTO: 27.5 PG (ref 26.5–33)
MCHC RBC AUTO-ENTMCNC: 32 G/DL (ref 31.5–36.5)
MCV RBC AUTO: 86 FL (ref 78–100)
PLATELET # BLD AUTO: 333 10E9/L (ref 150–450)
RBC # BLD AUTO: 4.32 10E12/L (ref 3.8–5.2)
WBC # BLD AUTO: 6.8 10E9/L (ref 4–11)

## 2017-09-07 PROCEDURE — A9270 NON-COVERED ITEM OR SERVICE: HCPCS | Mod: GY | Performed by: INTERNAL MEDICINE

## 2017-09-07 PROCEDURE — 25000131 ZZH RX MED GY IP 250 OP 636 PS 637: Mod: GY | Performed by: INTERNAL MEDICINE

## 2017-09-07 PROCEDURE — A9270 NON-COVERED ITEM OR SERVICE: HCPCS | Mod: GY | Performed by: SURGERY

## 2017-09-07 PROCEDURE — A9270 NON-COVERED ITEM OR SERVICE: HCPCS | Mod: GY | Performed by: STUDENT IN AN ORGANIZED HEALTH CARE EDUCATION/TRAINING PROGRAM

## 2017-09-07 PROCEDURE — 85027 COMPLETE CBC AUTOMATED: CPT

## 2017-09-07 PROCEDURE — 36415 COLL VENOUS BLD VENIPUNCTURE: CPT

## 2017-09-07 PROCEDURE — 25000132 ZZH RX MED GY IP 250 OP 250 PS 637: Mod: GY | Performed by: SURGERY

## 2017-09-07 PROCEDURE — 25000132 ZZH RX MED GY IP 250 OP 250 PS 637: Mod: GY | Performed by: INTERNAL MEDICINE

## 2017-09-07 PROCEDURE — 25000132 ZZH RX MED GY IP 250 OP 250 PS 637: Mod: GY | Performed by: PHYSICIAN ASSISTANT

## 2017-09-07 PROCEDURE — A9270 NON-COVERED ITEM OR SERVICE: HCPCS | Mod: GY | Performed by: PHYSICIAN ASSISTANT

## 2017-09-07 PROCEDURE — 25000125 ZZHC RX 250: Performed by: PHYSICIAN ASSISTANT

## 2017-09-07 PROCEDURE — 99221 1ST HOSP IP/OBS SF/LOW 40: CPT | Performed by: INTERNAL MEDICINE

## 2017-09-07 PROCEDURE — 12000001 ZZH R&B MED SURG/OB UMMC

## 2017-09-07 PROCEDURE — 25000132 ZZH RX MED GY IP 250 OP 250 PS 637: Mod: GY | Performed by: STUDENT IN AN ORGANIZED HEALTH CARE EDUCATION/TRAINING PROGRAM

## 2017-09-07 RX ADMIN — Medication 12.5 MG: at 07:59

## 2017-09-07 RX ADMIN — CYCLOSPORINE 75 MG: 25 CAPSULE, LIQUID FILLED ORAL at 20:04

## 2017-09-07 RX ADMIN — URSODIOL 250 MG: 250 TABLET ORAL at 20:04

## 2017-09-07 RX ADMIN — CYCLOSPORINE 75 MG: 25 CAPSULE, LIQUID FILLED ORAL at 07:59

## 2017-09-07 RX ADMIN — SENNOSIDES AND DOCUSATE SODIUM 1 TABLET: 8.6; 5 TABLET ORAL at 20:04

## 2017-09-07 RX ADMIN — URSODIOL 250 MG: 250 TABLET ORAL at 07:59

## 2017-09-07 RX ADMIN — Medication 5 MG: at 20:04

## 2017-09-07 RX ADMIN — ACETAMINOPHEN 1000 MG: 500 TABLET, FILM COATED ORAL at 22:27

## 2017-09-07 RX ADMIN — SENNOSIDES AND DOCUSATE SODIUM 1 TABLET: 8.6; 5 TABLET ORAL at 07:59

## 2017-09-07 RX ADMIN — VORICONAZOLE 200 MG: 200 TABLET, FILM COATED ORAL at 20:04

## 2017-09-07 RX ADMIN — LEVOTHYROXINE SODIUM 150 MCG: 150 TABLET ORAL at 08:02

## 2017-09-07 RX ADMIN — PREDNISONE 5 MG: 5 TABLET ORAL at 07:59

## 2017-09-07 RX ADMIN — VORICONAZOLE 200 MG: 200 TABLET, FILM COATED ORAL at 07:59

## 2017-09-07 RX ADMIN — METOPROLOL SUCCINATE 50 MG: 50 TABLET, EXTENDED RELEASE ORAL at 07:59

## 2017-09-07 ASSESSMENT — PAIN DESCRIPTION - DESCRIPTORS: DESCRIPTORS: ACHING

## 2017-09-07 NOTE — PROGRESS NOTES
"Surgery Cross-Cover Note  9/6/2017 10:28 PM     Was paged regarding blood streaks on stool and some blood on tissue while wiping.   The patient is asymptomatic, no abdominal pain    /70 (BP Location: Right arm)  Pulse 71  Temp 98.6  F (37  C) (Oral)  Resp 16  Ht 1.715 m (5' 7.5\")  Wt 87 kg (191 lb 12.8 oz)  SpO2 97%  BMI 29.6 kg/m2    I/O last 3 completed shifts:  In: 1900 [P.O.:1700; I.V.:200]  Out: -     Exam:  Alert, comfortable  Abd: soft, non tender, not distended  Patient refused a rectal exam before sleeping, she is alright with the exam in the morning  She does understand the risk of not being able to assess the magnitude of bleeding and missing out of bleeding anal lesions and is alright with it    Updated Dr Neely (colorectal fellow)    Assessment/Plan:  She is clinically stable and would not require active surgical intervention until she becomes hemodynamically unstable  - CBC tomorrow morning  - to hold apixaban  - please inform if she has another episode of bleeding    Claudette Morrell MD  PGY-1 General Surgery  Pager # [phone]    To call primary team after 6 am  "

## 2017-09-07 NOTE — DISCHARGE SUMMARY
[facility]  Discharge Summary  Colon and Rectal Surgery     Luz Thompson MRN# [id number]   YOB: 1950 Age: 67 year old     Date of Admission:  8/31/2017  Date of Discharge::  9/8/2017  Admitting Physician:  Yuridia Frost MD  Discharge Physician:  Yuridia Frost MD  Primary Care Physician:        Jennifer Barraza          Admission Diagnoses:   Rectal perforation (H) [K63.1]  Surveillance colonoscopy    H/o liver transplant 2002  Primary biliary cirrhosis  H/o stroke  H/o atrial fibrillation  Chronic anticoagulation  H/o DVT  CKD  HTN  H/o recurrent e-coli sepsis  Coccidiomycosis on voriconazole          Discharge Diagnosis:   Rectal perforation (H) [K63.1]  Surveillance colonoscopy  Hematochezia    H/o liver transplant 2002  Primary biliary cirrhosis  H/o stroke  H/o atrial fibrillation  Chronic anticoagulation  H/o DVT  CKD  HTN  H/o recurrent e-coli sepsis  Coccidiomycosis on voriconazole         Procedures:   8/31/2017:  Colonoscopy          Consultations:   VASCULAR ACCESS CARE ADULT IP CONSULT  INFECTIOUS DISEASE GENERAL ADULT IP CONSULT  GI HEPATOLOGY ADULT IP CONSULT  WOUND OSTOMY CONTINENCE NURSE  IP CONSULT  PHARMACY TO DOSE MEDICATION  CARDIOLOGY GENERAL ADULT IP CONSULT         Imaging Studies:     Results for orders placed or performed during the hospital encounter of 08/31/17   CT Abdomen Pelvis w/o Contrast    Narrative    EXAMINATION: CT ABDOMEN PELVIS W/O CONTRAST, 8/31/2017 9:47 AM    TECHNIQUE:  Helical CT images from the lung bases through the pubic  symphysis were obtained  without IV contrast.     COMPARISON: PET CT dated 8/8/2017.    HISTORY: rule out free air, rectal perforation from colonoscopy    FINDINGS:  Three new surgical clips project within the rectum. There is a small  adjacent hyperdense collection along the right rectal wall, measuring  1.8 x 2.1 cm in maximum transverse dimensions, and 2.6 cm  craniocaudally, series 4  image 70. Small foci of adjacent free air  extending along, but not beyond the mesorectal fascia. Otherwise,  numerous colonic diverticula multiple containing retained contrast. No  bowel dilatation or appreciable wall thickening. Surgical clips in the  region of the proximal duodenum. No suspicious lymphadenopathy. The  bladder is well distended. The uterus and adnexa are within normal  limits for the patient's age. No free fluid.    Postsurgical changes of prior partial liver transplantation.  Small-volume pneumobilia. The spleen, and adrenal glands are  unremarkable on this noncontrast exam. Fatty pancreatic atrophy. No  hydronephrosis. No acute osseous abnormality. Multiple ventral  fat-containing hernias as well as calcification within the abdominal  wall and gluteal regions.    The previously seen lingular nodule has mildly increased in size,  currently measuring 15 x 16 mm in maximum transverse dimensions with  mild adjacent nodularity/atelectasis. Redemonstration of bibasilar  fibroatelectasis and calcified pleural plaques and nodules greatest in  the right lower lobe. Mild noncalcified nodularity in the right lower  lobe is similar to prior without definite acute superimposed process.      Impression    IMPRESSION:   1.  New surgical clips within the rectum with small adjacent hematoma  and small volume of air on the right extending to the mesorectal  fascia.  2.  Previously seen lingular nodule has mildly increased in size  compared to PET CT of 8/8/2017.  3.  The remainder of the exam is not significantly changed.    Findings were discussed with Dr. Montano on 8/31/2017 at 1015 am.    BRUNO LEMUS MD   CT Abdomen Pelvis w Contrast    Narrative    EXAMINATION: CT ABDOMEN PELVIS W CONTRAST, 9/1/2017 12:44 PM    TECHNIQUE:  Helical CT images from the lung bases through the  symphysis pubis were obtained with IV contrast. Contrast dose: 115cc  Isovue 370    COMPARISON: CT abdomen 8/31/2017    HISTORY:  febrile with h/o rectal perforation s/p colonoscopy    FINDINGS:    Abdomen and pelvis: Surgical clips are again noted in the rectum  consistent with history of rectal perforation repair. There is mild  increase in size of a right sided perirectal hyperdense collection  (series 3 image 45) measuring 3.0 x 2.5 cm, previously 2.1 x 1.8 cm.  This is unchanged in CT attenuation values measuring between 45 and 50  Hounsfield units. No definite extravasated enteric contrast present.  There is an overall decrease in size of localized free air adjacent to  the collection since yesterday's exam. Small amount of simple fluid  attenuation free fluid surrounds the uterus and adnexa, new since the  prior exam. No new foci of free air in the abdomen.    Sigmoid diverticulosis without diverticulitis. No bowel obstruction.  Postsurgical changes of partial hepatic transplantation with unchanged  pneumobilia. Mild intrahepatic biliary ductal dilatation status post  cholecystectomy is unchanged. Normal spleen. Fatty infiltration of the  pancreas. Normal adrenals. Cortical thinning of both kidneys, left  greater than right is unchanged. Tiny hypodensities in both kidneys  are too small to characterize. No lymphadenopathy in the  abdomen/pelvis by size criteria. Multiple midline and paramedian  ventral wall abdominal hernias containing fat are unchanged in  appearance.    Lung bases: Known lingular nodule is not included in the field-of-view  on the current exam. Right lower lobe pleural-based calcifications are  unchanged. Parenchymal calcifications and surrounding groundglass  opacities in the right lower lobe are also unchanged.    Bones and soft tissues: No acute or suspicious osseous abnormality.  Soft tissues unremarkable.      Impression    IMPRESSION:   1. Mild increase in size of right perirectal hematoma status post  clipping for rectal perforation repair. Decreased amount of free air  with no new foci of free air  identified.  2. Small amount of simple attenuation free fluid about the uterus and  adnexa is likely reactive.    I have personally reviewed the examination and initial interpretation  and I agree with the findings.    BRUNO LEMUS MD     *Note: Due to a large number of results and/or encounters for the requested time period, some results have not been displayed. A complete set of results can be found in Results Review.          Medications Prior to Admission:     Prescriptions Prior to Admission   Medication Sig Dispense Refill Last Dose     metoprolol (TOPROL-XL) 50 MG 24 hr tablet Take 1 tablet (50 mg) by mouth daily 90 tablet 3 8/31/2017     apixaban ANTICOAGULANT (ELIQUIS) 2.5 MG tablet Take 1 tablet (2.5 mg) by mouth 2 times daily 180 tablet 3 8/30/2017     folic acid (FOLVITE) 1 MG tablet Take 1 tablet (1 mg) by mouth daily 100 tablet 3 8/31/2017     ezetimibe (ZETIA) 10 MG tablet Take 1 tablet (10 mg) by mouth daily , or as directed by Dr. Reyna. (Patient taking differently: Take 10 mg by mouth every other day , or as directed by Dr. Reyna.) 90 tablet 3 8/30/2017     furosemide (LASIX) 20 MG tablet Take 0.5 tablets (10 mg) by mouth daily 46 tablet 3 8/30/2017     hydrALAZINE (APRESOLINE) 25 MG tablet Take 0.5 tablets (12.5 mg) by mouth 3 times daily as needed , if systolic blood pressure is more than 140 mmHg. 270 tablet 3 8/31/2017     levothyroxine (SYNTHROID/LEVOTHROID) 150 MCG tablet Take one tablet daily 6 days a week and one and a half tablets one day a week. 96 tablet 3 8/31/2017     calcitRIOL (ROCALTROL) 0.5 MCG capsule Take 1 capsule (0.5 mcg) by mouth daily 90 capsule 3 8/30/2017     voriconazole (VFEND) 200 MG tablet Take 1 tablet (200 mg) by mouth 2 times daily 60 tablet 11 8/31/2017     predniSONE (DELTASONE) 5 MG tablet Take 1 tablet (5 mg) by mouth daily 90 tablet 3 8/31/2017     RAPAMUNE 0.5 MG PO TABLET Take 0.5 mg by mouth every other day 15 tablet 11 8/31/2017     clotrimazole  (LOTRIMIN) 1 % cream Apply topically 2 times daily as needed Reported on 4/25/2017 8/30/2017     ursodiol (ACTIGALL) 250 MG tablet Take 1 tablet (250 mg) by mouth 2 times daily 180 tablet 3 8/31/2017     Wheat Dextrin (BENEFIBER) POWD 2 teaspoons daily   8/30/2017     meclizine (ANTIVERT) 25 MG tablet Take 1 tablet (25 mg) by mouth as needed 30 tablet 11 8/30/2017     Multiple Vitamins-Minerals (PRESERVISION AREDS 2) CAPS Take 2 capsules by mouth daily (Patient taking differently: Take 1 capsule by mouth 2 times daily )   8/30/2017     triamcinolone (KENALOG) 0.1 % cream Apply topically 2 times daily Apply to rash (Patient taking differently: Apply topically 2 times daily as needed Apply to rash) 80 g 3 8/30/2017     Hypromellose (ARTIFICIAL TEARS OP) Apply 1 drop to eye as needed   8/30/2017     acetaminophen 500 MG CAPS Take 500 mg by mouth as needed Take 500-1,000 mg by mouth every 6 hours if needed.   8/30/2017     magnesium 250 MG tablet Take 2 tablets by mouth daily At night.    8/30/2017     zolpidem (AMBIEN) 5 MG tablet Take tablet by mouth 15 minutes prior to sleep, for Sleep Study 1 tablet 0      STATIN NOT PRESCRIBED, INTENTIONAL, Please choose reason not prescribed, below   Taking     albuterol (ALBUTEROL) 108 (90 BASE) MCG/ACT Inhaler Inhale 2 puffs into the lungs every 4 hours as needed for shortness of breath / dyspnea or wheezing Reported on 4/25/2017   More than a month     SUMAtriptan (IMITREX) 50 MG tablet Take 1 tablet (50 mg) by mouth at onset of headache for migraine repeat after 2 hours if needed. 30 tablet 3 Taking     STATIN NOT PRESCRIBED, INTENTIONAL, Statin not prescribed intentionally due to Intolerance (with supporting documentation of trying a statin at least once within the last 5 years) 0 each 0 Taking          Discharge Medications:     Current Discharge Medication List      CONTINUE these medications which have NOT CHANGED    Details   metoprolol (TOPROL-XL) 50 MG 24 hr tablet  Take 1 tablet (50 mg) by mouth daily  Qty: 90 tablet, Refills: 3    Associated Diagnoses: Paroxysmal atrial fibrillation (H)      apixaban ANTICOAGULANT (ELIQUIS) 2.5 MG tablet Take 1 tablet (2.5 mg) by mouth 2 times daily  Qty: 180 tablet, Refills: 3    Comments: This is while pt is taking Vfend and will also continue to evaluate renal function.  Associated Diagnoses: Atrial fibrillation, unspecified type (H)      folic acid (FOLVITE) 1 MG tablet Take 1 tablet (1 mg) by mouth daily  Qty: 100 tablet, Refills: 3    Associated Diagnoses: Liver replaced by transplant (H)      ezetimibe (ZETIA) 10 MG tablet Take 1 tablet (10 mg) by mouth daily , or as directed by Dr. Reyna.  Qty: 90 tablet, Refills: 3    Associated Diagnoses: Hyperlipidemia, unspecified hyperlipidemia type      furosemide (LASIX) 20 MG tablet Take 0.5 tablets (10 mg) by mouth daily  Qty: 46 tablet, Refills: 3    Associated Diagnoses: CKD (chronic kidney disease) stage 3, GFR 30-59 ml/min; Liver replaced by transplant (H)      hydrALAZINE (APRESOLINE) 25 MG tablet Take 0.5 tablets (12.5 mg) by mouth 3 times daily as needed , if systolic blood pressure is more than 140 mmHg.  Qty: 270 tablet, Refills: 3    Associated Diagnoses: HTN (hypertension)      levothyroxine (SYNTHROID/LEVOTHROID) 150 MCG tablet Take one tablet daily 6 days a week and one and a half tablets one day a week.  Qty: 96 tablet, Refills: 3    Associated Diagnoses: Postablative hypothyroidism      calcitRIOL (ROCALTROL) 0.5 MCG capsule Take 1 capsule (0.5 mcg) by mouth daily  Qty: 90 capsule, Refills: 3      voriconazole (VFEND) 200 MG tablet Take 1 tablet (200 mg) by mouth 2 times daily  Qty: 60 tablet, Refills: 11    Comments: Need to continue x 1 year. Please direct prior auth to our clinic staff at the [place] [facility]. Eri Jenkins can help, at phone [phone], fax [phone].  Associated Diagnoses: Coccidioidal pneumonitis (H)      predniSONE  (DELTASONE) 5 MG tablet Take 1 tablet (5 mg) by mouth daily  Qty: 90 tablet, Refills: 3    Associated Diagnoses: Thyroid cancer (H); Liver replaced by transplant (H)      RAPAMUNE 0.5 MG PO TABLET Take 0.5 mg by mouth every other day  Qty: 15 tablet, Refills: 11    Associated Diagnoses: Liver replaced by transplant (H)      clotrimazole (LOTRIMIN) 1 % cream Apply topically 2 times daily as needed Reported on 4/25/2017      ursodiol (ACTIGALL) 250 MG tablet Take 1 tablet (250 mg) by mouth 2 times daily  Qty: 180 tablet, Refills: 3    Associated Diagnoses: Liver replaced by transplant (H)      Wheat Dextrin (BENEFIBER) POWD 2 teaspoons daily      meclizine (ANTIVERT) 25 MG tablet Take 1 tablet (25 mg) by mouth as needed  Qty: 30 tablet, Refills: 11    Associated Diagnoses: Liver replaced by transplant (H)      Multiple Vitamins-Minerals (PRESERVISION AREDS 2) CAPS Take 2 capsules by mouth daily    Associated Diagnoses: Dry eyes, bilateral      triamcinolone (KENALOG) 0.1 % cream Apply topically 2 times daily Apply to rash  Qty: 80 g, Refills: 3    Associated Diagnoses: Contact dermatitis and other eczema, due to unspecified cause      Hypromellose (ARTIFICIAL TEARS OP) Apply 1 drop to eye as needed      acetaminophen 500 MG CAPS Take 500 mg by mouth as needed Take 500-1,000 mg by mouth every 6 hours if needed.      magnesium 250 MG tablet Take 2 tablets by mouth daily At night.       zolpidem (AMBIEN) 5 MG tablet Take tablet by mouth 15 minutes prior to sleep, for Sleep Study  Qty: 1 tablet, Refills: 0      !! STATIN NOT PRESCRIBED, INTENTIONAL, Please choose reason not prescribed, below    Associated Diagnoses: Statin intolerance      albuterol (ALBUTEROL) 108 (90 BASE) MCG/ACT Inhaler Inhale 2 puffs into the lungs every 4 hours as needed for shortness of breath / dyspnea or wheezing Reported on 4/25/2017      SUMAtriptan (IMITREX) 50 MG tablet Take 1 tablet (50 mg) by mouth at onset of headache for migraine repeat  after 2 hours if needed.  Qty: 30 tablet, Refills: 3    Associated Diagnoses: Thyroid cancer (H)      !! STATIN NOT PRESCRIBED, INTENTIONAL, Statin not prescribed intentionally due to Intolerance (with supporting documentation of trying a statin at least once within the last 5 years)  Qty: 0 each, Refills: 0    Associated Diagnoses: Statin intolerance       !! - Potential duplicate medications found. Please discuss with provider.                Brief History of Illness:   66 y/o female s/p prior Liver transplant in 2002 for PBC followed by norbert Eddy on eliquis, h/o DVT, CKD (most recent creat baseline 1.2-1.5), HTN, HLD, h/o recurrent e.coli sepsis (8/2013, 6/2015), 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  Pt underwent outpt screening colonoscopy on 8/31, c/b a small 6mm perforation in the rectum and had 3 clips placed.  CT scan showed new surgical clips in the rectum with small adjacent hematoma, small volume air on the right extending to mesorectal fascia.            Hospital Course:   Pt was admitted and given bowel rest, IV fluids and IV pain meds.  Pt had passed a few tablespoon-teaspoons of blood per rectum which tapered down over the next few days.  Transplant ID was consulted and pt was started on empiric meropenem.  Transplant Hepatology was also consulted and pt's home rapamune was held.  Pt's home prednisone and ursodiol was continued.  Pt remained afebrile.  Repeat CT Scan on 9/1/2017 showed mild increase in size of right sided perirectal hematoma.  Decreased amount of free air with no new foci of free air identified.  Pt was slowly advanced on a diet for which she tolerated.  Pt had eventual return of bowel function with the start of her home benefiber.  Pt's last dose of meropenem was on 9/5 per Infectious Disease.  Decision was made by the Transplant hepatology team to start cyclosporine instead of rapamune temporarily until more time is given the for  "site of perforation to heal.  Pt's CSA level on 9/8 was pending at the time of discharge.  Pt has been instructed to follow up with Transplant coordinator, Angelika for CSA dose adjustments.     Pt was restarted on her home Eliquis on 9/5. Unfortunately pt had more frequent blood streaked stools with the restart of the Eliquis.  This was held.  Cardiology was consulted as pt questioned whether she truly had atrial fibrillation that needed to be treated therefore wondered whether she truly needed to be on Eliquis and also questioned whether she could take aspirin instead of Eliquis.  Per cardiology, again recommended anticoagulation when safe from a surgical standpoint.  Could consider ASA but ultimately recommends eliquis.  Pt elected to not take Eliquis.  Pt would like to hold Eliquis until after her bronchoscopy. Although timing of bronchoscopy is unclear at this time.  From a colorectal perspective, we have recommended to hold any anticoagulation for 1 week from her last BM associated any blood.  If she does not have any bowel movements associated with blood for 7 days, she can resume either Aspirin or Eliquis.  Recommend that pt follows up with Dr. Gilmore to continue discussing ultimate anticoagulation regimen as an outpatient in 1-2 weeks if there continues to be concerns regarding Eliquis/Asprin or timing of restarting in association with when bronchoscopy will be rescheduled.      Patient is to follow up in the Colon and Rectal Surgery Clinic with Dr. Frost in 2-3 weeks.          Day of Discharge Physical Exam:   Blood pressure (!) 155/92, pulse 65, temperature 96.9  F (36.1  C), temperature source Oral, resp. rate 16, height 1.715 m (5' 7.5\"), weight 87 kg (191 lb 12.8 oz), SpO2 97 %.    Gen: AAOx3, NAD  Pulm: Non-labored breathing  Abd: Soft, non- tender, no guarding/rebound  Ext:  Warm and well-perfused         Discharge Instructions and Follow-Up:       Discharge Procedure Orders  Reason for your " hospital stay   Order Comments: You were hospitalized following a 6mm perforation of the rectal wall s/p colonoscopy.     Activity   Order Comments: Your activity upon discharge: activity as tolerated   Order Specific Question Answer Comments   Is discharge order? Yes      Adult [facility] Follow-up and recommended labs and tests   Order Comments: DIET  -Resume Regular Diet for at least 4-6 weeks unless cleared by Colorectal surgery.    -Stay well hydrated    ACTIVITY  -No driving while on narcotic analgesics (i.e. Percocet, oxycodone, Vicodin)    NOTIFY  Please contact Kandis Aviles LPN or Rhonda Barlow RN at [phone] for problems after discharge such as:  -Temperature > 101F, chills, rigors, dizziness  -Redness around or purulent drainage from wound  -Inability to tolerate diet, nausea or vomiting  -You stop passing gas, develop significant bloating, abdominal pain  -Have blood in stools/vomit  -Have severe diarrhea/constipation  -Any other questions or concerns.  - At nights (after 4:30pm), on weekends, or if urgent, call [phone] and ask the  to speak with the on-call Colorectal Surgery resident or fellow      Medication Instructions  Some of your medications may have changed. Please take only prescribed and resumed medications     FOLLOW-UP  1.  Please follow up with [facility]S Staff: Dr. Frost as needed for rectal pain/bleeding.  Please contact our clinic scheduler Leah Tena (phone # [phone]) or Tammy Du (phone # [phone])  to schedule a follow-up appointment.    2.  Follow up with your cardiologist in 2-3 weeks after discharge from the hospital to review this hospitalization and to continue discussing Eliquis versus aspirin.  From a colorectal surgery perspective, can restart either aspirin or Eliquis one week from your last bowel movement associated with blood.  Otherwise will defer ultimate conversations regarding starting Aspirin versus Eliquis-and timing of bronchoscopy to  your primary cardiologist.    3. Follow up with your liver transplant coordinator for cyclosporin dosing and next cyclosporin level check.    4.  Follow up with Infectious Disease after your bronchoscopy.       Appointments on [place] and/or [facility] (with [facility] or [facility] provider or service). Call [phone] if you haven't heard regarding these appointments within 7 days of discharge.     Full Code     Diet   Order Comments: Follow this diet upon discharge: Orders Placed This Encounter     Snacks/Supplements Adult: Ensure Plus (Adult); With Meals     Regular Diet Adult   Order Specific Question Answer Comments   Is discharge order? Yes               Home Health Care:   Not needed           Discharge Disposition:   Discharged to home      Condition at discharge: Stable      Mariola Orozco PA-C  Colon and Rectal Surgery     Pt was seen and discussed with Dr. Neely on 9/8/2017.    Staff Addendum:  Agree with the discharge summary as documented. I was personally involved with the discharge planning and hospital decision-making for this patient.  Yuridia Frost MD  Colon and Rectal Surgery Staff  [facility]

## 2017-09-07 NOTE — PROVIDER NOTIFICATION
Notified Dr. Claudette Morrell at 2045 regarding blood streaks on stool and toilet paper.  Patient had a small soft brown stool with streaks of bright red blood on stool, also blood on toilet paper with wiping.  No markel blood in the toilet water.  Eliquis held until further eval by MD.    MD also notified that patient is refusing to have PIV replaced unless it is needed for IV administration.    MD to come assess patient.  Orders were not obtained.

## 2017-09-07 NOTE — PROGRESS NOTES
Colorectal Surgery Progress Note      Subjective: johnathan diet.  No abd pain. But blood streaked BM last night and this AM.  Having difficulties sleeping.     Vitals:  Vitals:    09/06/17 0632 09/06/17 1542 09/06/17 2205 09/07/17 0715   BP: 142/71 127/61 144/70 (!) 155/92   BP Location:  Right arm Right arm Right arm   Pulse: 71   65   Resp: 16 16 16 16   Temp: 97.1  F (36.2  C) 98.5  F (36.9  C) 98.6  F (37  C) 96.9  F (36.1  C)   TempSrc: Oral Oral Oral Oral   SpO2: 100% 96% 97% 97%   Weight:       Height:         I/O:  I/O last 3 completed shifts:  In: 880 [P.O.:880]  Out: -     Physical Exam:  Gen: AAOx3, NAD  Pulm: Non-labored breathing  Abd: Soft, non-distended, minimally tender, no guarding/rebound  Ext:  Warm and well-perfused    BMP    Recent Labs  Lab 09/06/17  0809 09/05/17  0736 09/04/17  0705 09/03/17  0703 09/02/17  0648 09/01/17  0832 08/31/17  1306   NA  --   --  145*  --  142 142 138   POTASSIUM 4.0 3.8 3.8 3.7 3.8 3.4 3.9   CHLORIDE  --   --  112*  --  110* 110* 101   CO2  --   --  24  --  20 27 32   BUN  --   --  9  --  14 17 22   CR  --   --  1.09*  --  1.14* 1.39* 1.17*   GLC  --   --  101*  --  49* 95 128*   MAG 2.0  --   --  2.0  --   --   --      CBC    Recent Labs  Lab 09/05/17  0736 09/04/17  0854 09/03/17  0912 09/02/17  0648   WBC 5.6 6.4 8.4 10.2   HGB 11.9 10.6* 11.0* 10.5*   HCT 37.9 33.7* 35.1 34.0*    244 232 259         ASSESSMENT: 66 y/o female s/p prior Liver transplant in 2002 for PBC followed by norbert Eddy on eliquis, h/o DVT, CKD (most recent creat baseline 1.2-1.5), HTN, HLD, h/o recurrent e.coli sepsis (8/2013, 6/2015), 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  Pt underwent outpt screening colonoscopy on 8/31, c/b a small 6mm perforation in the rectum and had 3 clips placed.  CT scan showed new surgical clips in the rectum with small adjacent hematoma, small volume air on the right extending to mesorectal fascia.  Repeat CT  scan on 9/1 shows mild increasing in size of perirectal hematoma.  Decreased free air and no new foci of free air seen.     Neuro/Pain: tylenol 1000mg BID.   CV: metoprolol, hydralazine, zetia.  Continue to hold home lasix for now.   - holding eliquis due to blood streaked BM last night and this AM.   - apprec Cards recs - would like to talk to cards whether she ultimately needs to be on eliquis and whether she can use aspirin instead, questions whether she has true afib.   PULM: encourage IS.  GI/FEN: regular diet. Home benefiber.  On senna-docusate.   - apprec Hepatology management.  On pred 5mg daily. Holding rapamune. Started CSA 75mg BID.  CSA level scheduled for 9/8 at 7am.   : PAVEL  Heme:  Hgb check.   ID: apprec ID recs.  Stopped missy 9/5.  On home PO vori.   Endocrine: synthroid.   Activity: as tolerated.  Ppx: holding eliquis.   Dispo: possibly tomorrow if no further bloody BMs, fevers, medical questions answered.     Mariola Orozco PA-C   Colon and Rectal Surgery  [id number]     Patient was seen and discussed with Dr. Neely.

## 2017-09-07 NOTE — PROGRESS NOTES
"      GASTROENTEROLOGY PROGRESS NOTE        ASSESSMENT/ RECOMMENDATIONS:  67 year old woman with PBC s/p OLT liver transplant (2002) on prednisone and sirolimus, Sjogren's syndrome, who underwent screening colonoscopy complicated by rectal perforation on 8/31/17.  Intially mild fevers, no white count, small hematoma (3 cm) who has since been afebrile and continues to improve with conservative management.       Recommendations    Antibiotics per primary team.    Cyclosporine started in place of sirolimus on 9/5/17, continue prednisone 5 mg daily.    Cyclosporine level to be completed Friday 9/8 (inpatient or outpatient).     Follow up outpatient in liver clinic, already scheduled with Dr. Ramirez on September 19, 2017.     Surgery following for perforation, stable, no operative intervention at this point.     The patient was discussed and plan agreed upon with GI staff.    Mohan Rodriguez  [facility]  Gastroenterology Fellow    Physician Attestation  I, Stephanie Soto, saw and examined this patient, and in agreement with Dr. Quesada and their findings as well as plan of care as documented in the above note.    I personally reviewed vital signs, medications, labs and imaging.    Stephanie Soto MD  Hepatology staff  Patient seen on: 9/7/2017    _______________________________________________________________    S: very minor rectal pain on passing BM, no fever or chills overnight. Denied any chest pain, SOB or abdominal pain.     O:  Blood pressure (!) 155/92, pulse 65, temperature 96.9  F (36.1  C), temperature source Oral, resp. rate 16, height 1.715 m (5' 7.5\"), weight 87 kg (191 lb 12.8 oz), SpO2 97 %.    Gen: no distress  CV: normal S1, S2, no murmurs.   Lungs: CTAB  Abd: soft, non tender  Neuro: intact, no asterixis   Skin: no stigmata of chronic liver disease    LABS:  BMP  Recent Labs  Lab 09/06/17  0809 09/05/17  0736 09/04/17  0705 09/03/17  0703 09/02/17  0648 09/01/17  0832 08/31/17  1306 "   NA  --   --  145*  --  142 142 138   POTASSIUM 4.0 3.8 3.8 3.7 3.8 3.4 3.9   CHLORIDE  --   --  112*  --  110* 110* 101   KEILY  --   --  8.1*  --  7.7* 8.2* 8.7   CO2  --   --  24  --  20 27 32   BUN  --   --  9  --  14 17 22   CR  --   --  1.09*  --  1.14* 1.39* 1.17*   GLC  --   --  101*  --  49* 95 128*     CBC  Recent Labs  Lab 09/07/17  0832 09/05/17  0736 09/04/17  0854 09/03/17  0912   WBC 6.8 5.6 6.4 8.4   RBC 4.32 4.34 3.96 4.04   HGB 11.9 11.9 10.6* 11.0*   HCT 37.2 37.9 33.7* 35.1   MCV 86 87 85 87   MCH 27.5 27.4 26.8 27.2   MCHC 32.0 31.4* 31.5 31.3*   RDW 16.4* 16.6* 16.6* 16.6*    279 244 232     INRNo lab results found in last 7 days.  LFTs  Recent Labs  Lab 09/04/17  0705 09/03/17  0703 09/02/17  0648 08/31/17  1306   ALKPHOS 163* 189* 174* 233*   AST 24 20 26 31   ALT 26 32 37 50   BILITOTAL 0.2 0.4 0.3 0.3   PROTTOTAL 6.3* 7.1 6.2* 8.0   ALBUMIN 2.3* 2.5* 2.2* 3.1*      PANCNo lab results found in last 7 days.

## 2017-09-07 NOTE — CONSULTS
[facility]   Cardiology Consult Note    Reason for Consult: anticoagulation w/ a-fib with perirectal hematoma         Assessment and Plan:   68 y/o F with PMHx significant for PBC (s/p OTL Tx 2002), HTN, stroke, paroxysmal atrial fibrillation (CVasc = 5), Sjogren's, osteoporosis, HLD, CKD who presented 8/31/17 with colon perforation after screening colonoscopy.    At this time, would hold anticoagulation. If safe from a bleeding standpoint, a baby aspirin would be reasonable. Her daily risk of stroke is lower than her risk of bleeding in the immediate phase of perirectal hematoma / colon perforation, especially given hematochezia on short trial of Eliquis. She warrants chronic anticoagulation for [id number] = 5 (HTN, age, stroke 2, female) and yearly stroke risk 7.2%. However, this should obviously be held until deemed safe from a bleeding perspective given higher risk of daily hemorrhage.     We had a long discussion detailing the risk for stroke off of anticoagulation with the patient and she voiced understanding. She is agreeable with the plan to hold anticoagulation temporarily until it is deem appropriate from a surgical perspective to resume. She does have an upcoming bronchoscopy for a lung nodule. If her bronchoscopy is scheduled for more than 2 weeks out, and she is otherwise safe to resume Eliquis, we would recommend she take Eliquis until she needs to hold this med for her bronch.     Recommendations:  - If safe, consider asa 81 mg daily  - Will need to coordinate with surgery on opinion on when re-trial of anticoagulation would be clinically safe from a bleeding standpoint  - Follow-up with Dr. Gilmore or Deepa HILL CNP in 2-3 weeks, or soon post-bronchoscopy  - Hold anticoagulation until deemed safe from surgical perspective and then resume   - Hold anticoagulation for bronchoscopy as dictated by the surgical team and then resume when deemed safe to do so   - Consider  addition of low dose Losartan (12.5 mg) if continually hypertensive (allergy to lisinopril: cough)       Torsten Ingram PA-C  [facility] Cardiology Consult Team  Pager [phone] or [phone]      Bertin Christy  Cardiology Fellow    Patient was seen by and the above plan discussed with Dr. Epps.    CARDIOLOGY STAFF  Patient seen and examined by me.  History and physical examination discussed with Raghu Ingram and Dr. Christy whose note reflects our joint assessment and recommendation/plans.  67 year old woman followed by Dr. Gilmore for PAF.  She has [id number] of 5, but had a colon perforation after screening colonoscopy.  She had some bleeding with re-initiation of apixaban so the medication was held.  It should be resumed when deemed safe from the surgical standpoint.  Eben Epps         Subjective:   66 y/o F with PMHx significant for PBC (s/p OTL Tx 2002), HTN, stroke, paroxysmal atrial fibrillation (CVasc = 5), Sjogren's, osteoporosis, HLD, CKD who presented 8/31/17 with colon perforation after screening colonoscopy. She developed a kayla-rectal hematoma which required admission to surgical service and serial CT scans. It slightly grew from 8/31 (2.1 x 1.8 cm) to 9/1 (3.0 x 2.5 cm) and has not required surgery. She was resumed on Eliquis (for atrial fibrillation) on 9/5 given clinical stability and no hematochezia. However, she developed mild blood in the stool 9/6 afternoon for which Eliquis was held. Cardiology is now consulted for management of anticoagulation for atrial fibrillation in setting of perirectal hematoma and hematochezia.    She is a patient of Dr. Gilmore (and Dr. Reyna) and last saw Dr. Gilmore 8/14 when Eliquis was started. Her atrial fibrillation started 1/2017 when hospitalized for Valley Fever. She was not anticoagulated at that time given patient preference. She describes periodic episodes of stress related palpitations within the last 2-3 months with her FitBit showing HR low  100's. However, no further definitive evidence of atrial fibrillation other than hospitalization 1/2017. Previous TTE (mild LA index enlargement, EF normal) and Lexiscan (per Dr. Gilmore note normal). Were completed (TTE results pasted below).    Patient denies current chest pain, dyspnea on exertion, orthopnea, PND, lightheadedness, or palpitations. She is agreeable to anticoagulation in the future, but would like to wait until her hematochezia resolves. She also has a planned bronchoscopy in about 2 weeks for lung nodule biopsy (per pulmonology, could be granuloma, resolved coccidioidosis, malignancy).          Review of Systems:   A comprehensive review of systems was performed and found to be negative except as described in this note          Medications:     Current Facility-Administered Medications   Medication     ezetimibe (ZETIA) half-tab 5 mg     cycloSPORINE modified (GENERIC EQUIVALENT) capsule 75 mg     acetaminophen (TYLENOL) tablet 1,000 mg     potassium chloride SA (K-DUR/KLOR-CON M) CR tablet 20-40 mEq     potassium chloride (KLOR-CON) Packet 20-40 mEq     potassium chloride 10 mEq in 100 mL intermittent infusion     potassium chloride 10 mEq in 100 mL intermittent infusion with 10 mg lidocaine     potassium chloride 20 mEq in 100 mL NON-STANDARD CONC intermittent infusion     magnesium sulfate 2 g in NS intermittent infusion (PharMEDium or FV Cmpd)     magnesium sulfate 4 g in 100 mL sterile water (premade)     voriconazole (VFEND) tablet 200 mg     dextrose 50 % injection 25 mL     metoprolol (TOPROL-XL) 24 hr tablet 50 mg     senna-docusate (SENOKOT-S;PERICOLACE) 8.6-50 MG per tablet 1 tablet     HYDROmorphone (PF) (DILAUDID) injection 0.3-0.5 mg     naloxone (NARCAN) injection 0.1-0.4 mg     levothyroxine (SYNTHROID/LEVOTHROID) tablet 150 mcg     predniSONE (DELTASONE) tablet 5 mg     SUMAtriptan (IMITREX) tablet 50 mg     ursodiol (ACTIGALL) tablet 250 mg     hydrALAZINE (APRESOLINE) half-tab  "12.5 mg     levothyroxine (SYNTHROID/LEVOTHROID) tablet 225 mcg     ondansetron (ZOFRAN-ODT) ODT tab 4 mg    Or     ondansetron (ZOFRAN) injection 4 mg     prochlorperazine (COMPAZINE) injection 5 mg    Or     prochlorperazine (COMPAZINE) tablet 5 mg    Or     prochlorperazine (COMPAZINE) Suppository 12.5 mg          Objective:   Blood pressure (!) 155/92, pulse 65, temperature 96.9  F (36.1  C), temperature source Oral, resp. rate 16, height 1.715 m (5' 7.5\"), weight 87 kg (191 lb 12.8 oz), SpO2 97 %.  General Appearance: A&ox3, nad  Respiratory: clear bilaterally, no distress  Cardiovascular: RRR, no murmur  GI: NT, ND, obese  Skin: no rash  Other: Edema to LE but compressions stalkings in place         Data:     Recent Labs   Lab Test  09/06/17   0809  09/05/17   0736  09/04/17   0705   09/02/17   0648   NA   --    --   145*   --   142   POTASSIUM  4.0  3.8  3.8   < >  3.8   CHLORIDE   --    --   112*   --   110*   CO2   --    --   24   --   20   ANIONGAP   --    --   9   --   11   GLC   --    --   101*   --   49*   BUN   --    --   9   --   14   CR   --    --   1.09*   --   1.14*   KEILY   --    --   8.1*   --   7.7*    < > = values in this interval not displayed.     Lab Results   Component Value Date    WBC 5.6 09/05/2017     Lab Results   Component Value Date    RBC 4.34 09/05/2017     Lab Results   Component Value Date    HGB 11.9 09/05/2017     Lab Results   Component Value Date    HCT 37.9 09/05/2017     No components found for: MCT  Lab Results   Component Value Date    MCV 87 09/05/2017     Lab Results   Component Value Date    MCH 27.4 09/05/2017     Lab Results   Component Value Date    MCHC 31.4 09/05/2017     Lab Results   Component Value Date    RDW 16.6 09/05/2017     Lab Results   Component Value Date     09/05/2017       EKG results:  - none this admission, reviewed prior, NSR in 5/8/17    CT A/P (9/1/17):  1. Mild increase in size of right perirectal hematoma status post clipping for rectal " perforation repair. Decreased amount of free air with no new foci of free air identified.  2. Small amount of simple attenuation free fluid about the uterus and adnexa is likely reactive.    ECHO (7/17/2015):  Interpretation Summary  Global and regional left ventricular function is normal with an EF of 60-65%.  Right ventricular function, chamber size, wall motion, and thickness are  normal.  Pulmonary artery systolic pressure is normal.  The inferior vena cava is normal.  No pericardial effusion is present.  ______________________________________________________________________________           Left Ventricle  Global and regional left ventricular function is normal with an EF of 60-65%.  Left ventricular wall thickness is normal. Left ventricular size is normal.     Right Ventricle  Right ventricular function, chamber size, wall motion, and thickness are  normal.  Atria  The right atria appears normal. Mild left atrial enlargement is present.     Mitral Valve  The mitral valve is normal.     Aortic Valve  Trace to mild aortic insufficiency is present.     Tricuspid Valve  The tricuspid valve is normal. The right ventricular systolic pressure is  approximated at 21.3 mmHg plus the right atrial pressure. Pulmonary artery  systolic pressure is normal.     Pulmonic Valve  The pulmonic valve is normal. Trace pulmonic insufficiency is present.     Vessels  The aorta root is normal. The inferior vena cava is normal.  Pericardium  No pericardial effusion is present.     ______________________________________________________________________________  MMode/2D Measurements & Calculations  IVSd: 1.1 cm  LVIDd: 3.8 cm  LVIDs: 3.1 cm  LVPWd: 0.83 cm  FS: 20.6 %  EDV(Teich): 63.5 ml  ESV(Teich): 36.4 ml  LV mass(C)d: 113.3 grams  Ao root diam: 3.1 cm  LA dimension: 3.2 cm  asc Aorta Diam: 3.3 cm  LA/Ao: 1.0  LVOT diam: 1.9 cm  LVOT area: 2.8 cm2  LA Volume (BP): 74.0 ml     LA Volume Index (BP): 36.6 ml/m2

## 2017-09-07 NOTE — PLAN OF CARE
Problem: Goal Outcome Summary  Goal: Goal Outcome Summary  Outcome: No Change  Assumed care of this patient at 1930.  Patient had a soft tan BM with streaks of red blood, blood on toilet paper, but no markel blood in the toilet bowl.  MD notified and came to assess patient.  Evening Eliquis dose held, but otherwise no new orders.  VS remain stable.  No further BM or signs of bleeding overnight.  Up independently in room.  Voiding, not saving urine.  Tolerating regular diet.  PIV painful and red, discontinued.  Patient refused to have another one inserted, MD notified.  Refused PCDs overnight, wears compression stockings during the day.     Patient tearful, overwhelmed, and frustrated overnight, with difficulty falling asleep.  States she has been through a lot this past year with Valley Fever illness, loss of both her mother and ex- weeks apart, and this unanticipated hospitalization.  She also reports worry about her upcoming bronchoscopy, as she states she was informed that the chances are 50/50 of malignancy.  Active listening and emotional support provided.      9/7/2017  7:09 AM  Patient had a large soft, tan/brown stool with red streaks.  MD notified.  BM left in toilet for MD assessment.

## 2017-09-07 NOTE — PROGRESS NOTES
[facility]  Transplant Infectious Disease Progress Note      Patient:  Luz Thompson, Date of birth 1950, Medical record number [id number]  Date of Visit:  09/07/2017         Assessment and Recommendations:   Recommendations:  - continue off of ABx.   - follow up with Dr. Montero as OP after bronchoscopy with biopsy is done.     ID will sign off, please call for questions.      Assessment:  Virginia is a 67 year old woman immunosuppressed s/p 2002 liver transplant with recurrent gram negative sepsis. She has known sigmoid diverticulosis. She gets episodes of defecation syncope.  Infectious Disease issues include:  - Rectal perforation with fever x2 only after colonoscopy 8/31/2017. Eloquis and rapamune stopped to assist with wound healing. Bronchoscopy postponed so that any interior pressure created by coughing does not open perf any further. Given many allergies, she was started on meropenem to cover aerobes and anaerobes.  The fever resolved. It was likely due to perforated viscus that may have resulted in transient translocation of colonic bacteria or due to local inflammatory process without infection resulted from the perforation and the local hematoma.   Given the lack of evidence of active infectious process, drug-drug interaction with PO bactrim and voriconazole, the requirement of IV ABx through PICC line if we can't use PO bactrim, and multiple allergies; I felt that DCing ABx is reasonable. 48 hr after last dose meropenem on 9/5/2017, patient remains afebrile. Will continue off of ABx.    - Pulmonary Coccidiodes infection. Followup CT imaging shows just a little increased uptake in lung nodule on PET imaging. Bronchoscopy deferred due to rectal perforation. Will continue voriconazole PO (resumed on 9/4/2017).   The patient will get bronchoscopy done as OP and will follow with Dr. Montero as OP after the biopsy is done.   I will defer the management of voriconazole to   Young.      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  - Self-triggered use of prn antibiotics due to recurrent episodes of sepsis and cholangitis with bactrim. If not responding she usually presents to ED for ertapenem.   - Hx of bacterial superinfection of chronic venous stasis changes on the legs, 7/27/2016.  - Hx of recurrent E coli sepsis 8/13/2013, 6/10/2015.   - Hx of Klebsiella UTI, 7/18/16. She completed a 14-day course of macrobid.  - Hx of Haemophilus influenzae pneumonia in 9/2014.  - Hx of angular chelitis, hx of thrush extending from under her upper denture plate, and onychomycosis.   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   - PCP prophylaxis: Not needed, as most recent CD4 count was > 200.   - Serostatus: CMV seronegative, EBV seropositive on 8/15/13.  - Immunization status: Completed PCV13 and PPSV23 x2 with last dose in 5/2016.  - Gamma globulin status: Endogenously replete.  - Isolation status: Good hand hygience. When she is an inpatient, she needs to be in contact isolation for known VRE carriage.      Branden Meraz   Pager: [phone]  9/7/2017            Interim History:   After eloquis is resumed, now the patient has blood streaks with BM.   eloquis was DCed again and patient is going to be inpatient for now.     No fever or chills. No pain.         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 She has venous stasis changes of her legs, and she can get stasis dermatitis if she does not wear her compression stockings. This is especially problematic if the weather is very warm and for that reason she does not want to wear compression stockings. She received Rituxan therapy for EBV viremia on 8/31/2016. She had some side effects in the days following the dose of Rituxan, but otherwise would be able to tolerate it again  in the future. In the [id number] winter season, she was diagnosed with coccidioidomycosis. There was a strong pulmonary component to the infection, and she is being treated with voriconazole. She had a 7/31/2017 visit with pulmonary. There is a lung nodule in the lingula, and a slightly enlarging rounded nodular opacity. Differential includes developing granuloma from resolved infection, new or superimposed infection, malignancy. She then had a PET/CT which shows that the nodule is avid, so there is a plan to pursue a percutaneous lung biopsy shortly.       Since Virginia was last seen by me in clinic on 8/16/2017, she was in the procedure area today for colonscopy and bronchoscopy. There was a rectal perforation. CT imaging showed some air. I was called and we started meropenem. Pain is now finally controlled, she has been admitted. Overnight plan is to CT in 24-48 hours, and then based on that decide whether to proceed with surgery. It will take 3-5 days for the hole to self-seal in most patients. Rapa and Eloquis were d/c to assist in the healing process.    Transplants:  5/22/2002 (Liver), Postoperative day:  5587.  Coordinator Angelika Luo    Review of Systems:  As mentioned in the interim history otherwise negative by reviewing central and peripheral neurological systems, respiratory system, cardiac system, GI system,  system, musculoskeletal, skin, allergy, and lymphatics.            Current Medications & Allergies:       ezetimibe  5 mg Oral QPM     cycloSPORINE modified  75 mg Oral BID IS     voriconazole  200 mg Oral Q12H LUZMARIA     metoprolol  50 mg Oral Daily     senna-docusate  1 tablet Oral BID     levothyroxine  150 mcg Oral Once per day on Sun Tue Wed Thu Fri Sat     predniSONE  5 mg Oral Daily     ursodiol  250 mg Oral BID     hydrALAZINE  12.5 mg Oral Daily     levothyroxine  225 mcg Oral Q7 Days       Infusions/Drips:       Allergies   Allergen Reactions     Fluconazole Hives and Itching      Azithromycin Itching     Benadryl [Diphenhydramine Hcl]      Insomnia      Cefpodoxime Itching     Cellcept Diarrhea     Ciprofloxacin Other (See Comments)     Insomnia, mood lability     Codeine      Psych disturbance     Diphenhydramine Other (See Comments)     Doxycycline      Lansoprazole Diarrhea     Levaquin [Levofloxacin] Other (See Comments)     Headache, hyperactivity     Lisinopril Cough     Methotrexate      Sores     Morphine Itching     Morphine Sulfate Itching     Mycophenolate Diarrhea     Pravastatin Muscle Pain (Myalgia)     Simvastatin Muscle Pain (Myalgia)     severe     Cephalexin Rash     Fever and skin burning     Penicillin G Itching and Rash     Tolectin [Nsaids] Rash     Tramadol Rash            Physical Exam:   Vitals were reviewed.  All vitals stable.  Patient Vitals for the past 24 hrs:   BP Temp Temp src Pulse Resp SpO2   09/07/17 1543 150/68 98.3  F (36.8  C) Oral 72 16 97 %   09/07/17 0715 (!) 155/92 96.9  F (36.1  C) Oral 65 16 97 %   09/06/17 2205 144/70 98.6  F (37  C) Oral - 16 97 %     Ranges for vital signs:  Temp:  [96.9  F (36.1  C)-98.6  F (37  C)] 98.3  F (36.8  C)  Pulse:  [65-72] 72  Heart Rate:  [67-72] 72  Resp:  [16] 16  BP: (144-155)/(68-92) 150/68  SpO2:  [97 %] 97 %  Vitals:    08/31/17 0717 09/02/17 1100   Weight: 84.8 kg (187 lb) 87 kg (191 lb 12.8 oz)       Physical Examination:  GENERAL:  well-developed, well-nourished, in bed in no acute distress.           Laboratory Data:     Absolute CD4   Date Value Ref Range Status   08/19/2014 415 mm3 Final   09/16/2013 1266 mm3 Final   09/15/2013 DUPLICATE,TESTING DONE ON SPECIMEN FROM 9.16.13 mm3 Final   11/13/2008 1332 mm3 Final       Inflammatory Markers    Recent Labs   Lab Test  09/03/17   0912  09/02/17   0648  09/01/17   0832  05/14/16   0659  05/13/16   0940  09/23/14   0810  08/19/14   1530   06/30/14   0825  05/15/14   1112  05/08/14   0806   SED   --    --    --    --   67*  79*  76*   --   51*  58*  51*    CRP  64.0*  71.0*  51.0*  21.0*  19.0*  6.1  29.8*   < >  12.5*  8.0  <5.0    < > = values in this interval not displayed.       Immune Globulin Studies     Recent Labs   Lab Test  08/19/14   1530  05/21/12   0754   IGG  1220  1070   IGM   --   97   IGE  46   --    IGA   --   85   IGG1  684   --    IGG2  441   --    IGG3  70   --    IGG4  19   --        Metabolic Studies       Recent Labs   Lab Test  09/06/17   0809  09/05/17   0736  09/04/17   0705   09/02/17   0648   05/10/17   0929   05/13/16   2037  05/13/16   1723  05/13/16   0940   07/23/14   1119   02/19/14   0700   NA   --    --   145*   --   142   < >  138   < >   --   138  132*   < >  137   < >  141   POTASSIUM  4.0  3.8  3.8   < >  3.8   < >  4.2   < >   --   3.9  3.6   < >  3.8   < >  4.7   CHLORIDE   --    --   112*   --   110*   < >  102   < >   --   104  96   < >  98   < >  111*   CO2   --    --   24   --   20   < >  26   < >   --   25  26   < >  27   < >  21   ANIONGAP   --    --   9   --   11   < >  10   < >   --   8  10   < >  12   < >  8   BUN   --    --   9   --   14   < >  36*   < >   --   25  29   < >  35*   < >  36*   CR   --    --   1.09*   --   1.14*   < >  1.53*   < >   --   1.55*  1.64*   < >  1.70*   < >  1.48*   GFRESTIMATED   --    --   50*   --   47*   < >  34*   < >   --   33*  31*   < >  30*   < >  36*   GLC   --    --   101*   --   49*   < >  104*   < >   --   89  98   < >  109*   < >  131*   KEILY   --    --   8.1*   --   7.7*   < >  9.7   < >   --   8.4*  9.1   < >  9.1   < >  8.3*   PHOS   --    --    --    --    --    --   3.1   < >   --    --    --    < >   --    < >   --    MAG  2.0   --    --    < >   --    < >  2.8*   < >   --    --   2.2   < >   --    < >  2.4*   LACT   --    --    --    --    --    --    --    --    --    --   0.8   --   1.7   --    --    PCAL   --    --    --    --    --    --    --    --    --   0.38   --    --   0.21   --    --    FGTL   --    --    --    --    --    --    --    --   <31  Unit: pg/mL      --    --    < >   --    --    --    CKT   --    --    --    --    --    --    --    --    --    --   78   --    --    --   56    < > = values in this interval not displayed.       Hepatic Studies    Recent Labs   Lab Test  09/04/17   0705  09/03/17   0703  09/02/17   0648   07/24/14   0814   09/15/13   1431   12/20/12   2110   BILITOTAL  0.2  0.4  0.3   < >  0.4   < >   --    < >  <0.1*   BILIDELTA   --    --    --    --   0.3   < >   --    < >  0.0   BILICONJ   --    --    --    --   0.0   < >   --    < >  0.0   DBIL  <0.1  <0.1  <0.1   < >   --    --    --    --    --    ALKPHOS  163*  189*  174*   < >  183*   < >   --    < >  93   PROTTOTAL  6.3*  7.1  6.2*   < >  6.6*   < >   --    < >  4.9*   ALBUMIN  2.3*  2.5*  2.2*   < >  3.4   < >   --    < >  2.4*   AST  24  20  26   < >  92*   < >   --    < >  25   ALT  26  32  37   < >  140*   < >   --    < >  31   LDH   --    --    --    --    --    --   603   --   621    < > = values in this interval not displayed.       Pancreatitis testing    Recent Labs   Lab Test  08/22/17   0905   07/23/14   1119   02/18/14   0852   11/06/13   1246   08/13/13   1046   LIPASE   --    --   32   --   55   --   59   --   52   TRIG  447*   < >   --    < >   --    < >   --    < >   --     < > = values in this interval not displayed.       Gout Labs      Recent Labs   Lab Test  12/31/13   1443  07/30/13   1441   URIC  6.7  6.7       Hematology Studies      Recent Labs   Lab Test  09/07/17   0832  09/05/17   0736  09/04/17   0854  09/03/17   0912  09/02/17   0648  09/01/17   0832   WBC  6.8  5.6  6.4  8.4  10.2  9.4   ANEU   --    --    --    --   7.4  6.5   ALYM   --    --    --    --   1.7  1.7   KATHY   --    --    --    --   0.9  1.0   AEOS   --    --    --    --   0.2  0.1   HGB  11.9  11.9  10.6*  11.0*  10.5*  11.1*   HCT  37.2  37.9  33.7*  35.1  34.0*  35.0   PLT  333  279  310 315  259  269       Clotting Studies    Recent Labs   Lab Test  05/16/16   0827  05/13/16   0940   11/06/13   1246  10/14/13   0904   INR  1.02  1.11  0.96  0.98   PTT   --   33   --    --        Iron Testing    Recent Labs   Lab Test  09/07/17   0832   07/11/13   0814   04/18/13   0809  03/21/13   0812   12/22/12   1346   12/20/12 2005 12/07/12   1345   IRON   --    --   58   --   63  90   < >   --    --    --   48   FEB   --    --   285   --   315  285   < >   --    --    --   156*   IRONSAT   --    --   20   --   20  32   < >   --    --    --   30   LAURA   --    --   195   < >  232  202   < >   --    --    --   317*   MCV  86   < >  88   < >  92  98   < >  100   < >  103*   --    B12   --    --    --    --    --    --    --    --    --    --   281   HAPT   --    --    --    --    --    --    --    --    --   137   --    RETP   --    --    --    --    --    --    --   2.7*   --    --    --    RETICABSCT   --    --    --    --    --    --    --   71.0   --    --    --     < > = values in this interval not displayed.       Markers    Alpha Fetoprotein   Date Value Ref Range Status   03/26/2012 4.1 0 - 8 ug/L Final     Comment:     Reference ranges apply to non-pregnant females only.       Autoimmune Testing    Recent Labs   Lab Test  05/13/16   1723  08/19/14   1530   PRANAV  <1.0  Interpretation:  Negative    <1.0  Interpretation:  Negative     RHF   --   <20       Thyroid Studies     Recent Labs   Lab Test  07/13/17   0843  05/10/17   0929  07/18/16   1430  05/24/16   0846  07/20/15   1010   09/23/14   0810   05/08/14   0806   TSH  3.66  4.74*  2.43  3.65  3.41   < >  2.87   < >  4.41   T4  1.59*  1.48*  1.38  1.19  1.24   < >  1.29  9.1   --   1.19  9.1   T3   --    --    --    --    --    --   65   --   48*    < > = values in this interval not displayed.       Urine Studies     Recent Labs   Lab Test  08/31/17   2100  08/22/17   0913  07/18/16   1503  05/13/16   1114  04/30/15   0951   08/19/14   1410   URINEPH  8.0*  7.0  5.5  6.5  6.0   < >  7.0   NITRITE  Negative  Negative  Negative  Negative  Negative    < >  Negative   LEUKEST  Small*  Trace*  Large*  Moderate*  Negative   < >  Negative   WBCU  2  2-5*  10-25*  4*   --    --   <1    < > = values in this interval not displayed.       Medication levels    Recent Labs   Lab Test  07/13/17   0843   09/15/13   0435   12/21/12   1344   VANCOMYCIN   --    --   26.3   --    --    RAPAMY  5.8   < >   --    < >   --    MPACID   --    --    --    --   5.42*   MPAG   --    --    --    --   83.5    < > = values in this interval not displayed.       Microbiology:  Cryptococcal antigen    Recent Labs   Lab Test  08/14/13   0822   CRPTT  Negative for cryptococcal antigen A negative cryptococcal antigen test does not exclude cryptococcal infection. If  a fungal culture is desired, it must be ordered separately.       Beta D Glucan levels (Fungitell assay)    Recent Labs   Lab Test  05/13/16   2037  08/19/14   1530   FGTL  <31  Unit: pg/mL    <31  Unit: pg/mL         Last Culture results with specimen source  Culture Micro   Date Value Ref Range Status   07/18/2016 (A)  Final    50,000 to 100,000 colonies/mL Klebsiella pneumoniae   05/21/2016 No growth  Final   05/21/2016 No growth  Final   05/16/2016 Canceled, Test credited Duplicate request  Final   05/16/2016 Canceled, Test credited Duplicate request  Final   05/16/2016 No growth  Final   05/16/2016 No growth  Final   05/13/2016 No growth after 29 days  Final   05/13/2016   Final    Canceled, Test credited Quantity not sufficient Notification of test cancellation   was given to MATTHEW FROM [facility], HE WILL REORDER AND RECOLLECT     05/13/2016 No growth  Final   05/13/2016 No growth  Final   05/13/2016   Final    10,000 to 50,000 colonies/mL mixed urogenital louann  Susceptibility testing not routinely done     05/13/2016 No growth  Final   09/25/2014 (A)  Final    Normal louann  Moderate growth Haemophilus influenzae Beta lactamase negative     09/03/2014 (A)  Final    Normal louann  Heavy growth Haemophilus influenzae Beta  lactamase negative  beta lactamase negative isolates are susceptible to ampicillin,   amoxacillin/clavulanic, levofloxacin and ceftriaxone     08/19/2014 No growth  Final   08/19/2014 No growth  Final   07/24/2014 No growth  Final   07/24/2014 No growth  Final    Specimen Description   Date Value Ref Range Status   07/18/2016 Midstream Urine  Final   05/21/2016 Blood Right Arm  Final   05/21/2016 Blood Left Arm  Final   05/16/2016 Blood  Final   05/16/2016 Blood  Final   05/16/2016 Blood Left Arm  Final   05/16/2016 Blood Right Arm  Final   05/13/2016 Blood Unspecified Site  Final   05/13/2016 Blood Unspecified Site  Final   05/13/2016 Blood Right Arm  Final   05/13/2016 Blood Right Arm  Final   05/13/2016 Midstream Urine  Final   05/13/2016 Nasal  Final   05/13/2016 Blood Venipuncture Collection VPT  Final   09/25/2014 Sputum  Final   09/25/2014 Sputum  Final   09/03/2014 Sputum  Final   09/03/2014 Sputum  Final   08/19/2014 Blood Right Arm  Final        Last check of C difficile  C Diff Toxin B PCR   Date Value Ref Range Status   05/17/2012   Final    Negative: Clostridium difficile target DNA sequences NOT detected, presumed   negative for Clostridium difficile toxin B or the number of bacteria present   may be below the limit of detection for the test.   FDA approved assay performed using IQMS GeneXpert real-time PCR.   A negative result does not exclude actual disease due to Clostridium difficile   and may be due to improper collection, handling and storage of the specimen or   the number of organisms in the specimen is below the detection limit of the   assay.       Infectious Disease Testing     Recent Labs   Lab Test  08/19/14   1529   TBRSLT  Negative       Virology:  EBV DNA Copies/mL   Date Value Ref Range Status   08/22/2017 EBV DNA Not Detected EBVNEG^EBV DNA Not Detected [Copies]/mL Final   04/11/2017 (A) EBVNEG [Copies]/mL Final    <500  EBV DNA Detected below the reportable range of 500  Copies/mL     12/09/2016 1219 (A) EBVNEG [Copies]/mL Final   08/08/2016 72308 (A) EBVNEG [Copies]/mL Final   07/26/2016 01723 (A) EBVNEG [Copies]/mL Final   05/24/2016 71461 (A) EBVNEG [Copies]/mL Final       BK Virus Testing     BK Virus Result   Date Value Ref Range Status   07/28/2011 <1000 <1000 copies/mL Final       BK Virus Testing     Recent Labs   Lab Test  07/28/11   1150   BKSPEC  Urine   BKRES  <1000   BKLOG  <3.0 The lower limit of detection for this assay is 1000 copies/mL.  Real-time TaqMan  PCR was performed using BK primers and probe for the detection of a 90 bp  portion of the  1 gene.  The performance characteristics were validated by  the [facility], [place].  It has not been cleared or approved by the U.S. Food and Drug Administration.       Hepatitis B Testing     Recent Labs   Lab Test  05/13/16   1723  03/26/12   0856   AUSAB  0.09   --    HBSAB   --   0.0   HBCAB  Nonreactive  Negative   HEPBANG  Nonreactive  Negative        Hepatitis C Antibody   Date Value Ref Range Status   05/13/2016  NR Final    Nonreactive   Assay performance characteristics have not been established for newborns,   infants, and children     03/26/2012 Negative NEG Final       CMV Antibody IgG   Date Value Ref Range Status   05/13/2016  0.0 - 0.8 AI Final    <0.2  Negative   Antibody index (AI) values reflect qualitative changes in antibody   concentration that cannot be directly associated with clinical condition or   disease state.       CMV IgG Antibody   Date Value Ref Range Status   08/15/2013 <0.20  Negative for anti-CMV IgG U/mL Final     EBV Capsid Antibody IgG   Date Value Ref Range Status   05/13/2016 (H) 0.0 - 0.8 AI Final    >8.0  Positive, suggests recent or past exposure   Antibody index (AI) values reflect qualitative changes in antibody   concentration that cannot be directly associated with clinical condition or   disease state.       EBV VCA IgG Antibody   Date Value Ref  Range Status   08/15/2013 >750.00  Positive, suggests immunologic exposure. U/mL Final       Imaging:  No results found for this or any previous visit (from the past 48 hour(s)).

## 2017-09-07 NOTE — PLAN OF CARE
Problem: Goal Outcome Summary  Goal: Goal Outcome Summary  Outcome: Improving  Patient denies pain, declines interventions. Tolerating regular diet. Reports passing flatus, last BM this AM, with red, bloody streaks in stool (MDs aware). Cards eval complete. Eliquis discontinued. Voiding with adequate output, not saving. Up ad ashley, ambulating frequently. Per MD team, possible d/c 9/8 if having stools without blood. Continue with POC.     Pt very upset about uncertainty of discharge plans today. MD team notified and has seen the patient, validated feelings, offered encouragement and discussed the plan of care with the patient. Per team and pt agreement, pt is to discharge tomorrow after AM lab draws.

## 2017-09-08 ENCOUNTER — CARE COORDINATION (OUTPATIENT)
Dept: CARE COORDINATION | Facility: CLINIC | Age: 67
End: 2017-09-08

## 2017-09-08 ENCOUNTER — CARE COORDINATION (OUTPATIENT)
Dept: CARDIOLOGY | Facility: CLINIC | Age: 67
End: 2017-09-08

## 2017-09-08 VITALS
RESPIRATION RATE: 16 BRPM | OXYGEN SATURATION: 96 % | DIASTOLIC BLOOD PRESSURE: 63 MMHG | HEART RATE: 68 BPM | TEMPERATURE: 96.8 F | SYSTOLIC BLOOD PRESSURE: 145 MMHG | WEIGHT: 191.8 LBS | BODY MASS INDEX: 29.07 KG/M2 | HEIGHT: 68 IN

## 2017-09-08 LAB
CYCLOSPORINE BLD LC/MS/MS-MCNC: 96 UG/L (ref 50–400)
SIROLIMUS BLD-MCNC: <2 UG/L (ref 5–15)
TME LAST DOSE: ABNORMAL H
TME LAST DOSE: NORMAL H

## 2017-09-08 PROCEDURE — 25000132 ZZH RX MED GY IP 250 OP 250 PS 637: Mod: GY | Performed by: STUDENT IN AN ORGANIZED HEALTH CARE EDUCATION/TRAINING PROGRAM

## 2017-09-08 PROCEDURE — 80195 ASSAY OF SIROLIMUS: CPT | Performed by: STUDENT IN AN ORGANIZED HEALTH CARE EDUCATION/TRAINING PROGRAM

## 2017-09-08 PROCEDURE — 36415 COLL VENOUS BLD VENIPUNCTURE: CPT | Performed by: STUDENT IN AN ORGANIZED HEALTH CARE EDUCATION/TRAINING PROGRAM

## 2017-09-08 PROCEDURE — 80158 DRUG ASSAY CYCLOSPORINE: CPT | Performed by: STUDENT IN AN ORGANIZED HEALTH CARE EDUCATION/TRAINING PROGRAM

## 2017-09-08 PROCEDURE — 25000132 ZZH RX MED GY IP 250 OP 250 PS 637: Mod: GY | Performed by: PHYSICIAN ASSISTANT

## 2017-09-08 PROCEDURE — 25000132 ZZH RX MED GY IP 250 OP 250 PS 637: Mod: GY | Performed by: SURGERY

## 2017-09-08 PROCEDURE — 25000125 ZZHC RX 250: Performed by: PHYSICIAN ASSISTANT

## 2017-09-08 PROCEDURE — A9270 NON-COVERED ITEM OR SERVICE: HCPCS | Mod: GY | Performed by: PHYSICIAN ASSISTANT

## 2017-09-08 PROCEDURE — 25000131 ZZH RX MED GY IP 250 OP 636 PS 637: Mod: GY | Performed by: INTERNAL MEDICINE

## 2017-09-08 PROCEDURE — A9270 NON-COVERED ITEM OR SERVICE: HCPCS | Mod: GY | Performed by: SURGERY

## 2017-09-08 PROCEDURE — A9270 NON-COVERED ITEM OR SERVICE: HCPCS | Mod: GY | Performed by: STUDENT IN AN ORGANIZED HEALTH CARE EDUCATION/TRAINING PROGRAM

## 2017-09-08 RX ORDER — EZETIMIBE 10 MG/1
10 TABLET ORAL EVERY EVENING
Qty: 30 TABLET | Refills: 0 | COMMUNITY
Start: 2017-09-08 | End: 2018-08-03

## 2017-09-08 RX ORDER — CYCLOSPORINE 25 MG/1
75 CAPSULE, LIQUID FILLED ORAL 2 TIMES DAILY
Qty: 180 CAPSULE | Refills: 0 | Status: SHIPPED | OUTPATIENT
Start: 2017-09-08 | End: 2017-09-14

## 2017-09-08 RX ADMIN — LEVOTHYROXINE SODIUM 150 MCG: 150 TABLET ORAL at 08:31

## 2017-09-08 RX ADMIN — CYCLOSPORINE 75 MG: 25 CAPSULE, LIQUID FILLED ORAL at 08:30

## 2017-09-08 RX ADMIN — Medication 12.5 MG: at 08:31

## 2017-09-08 RX ADMIN — METOPROLOL SUCCINATE 50 MG: 50 TABLET, EXTENDED RELEASE ORAL at 08:31

## 2017-09-08 RX ADMIN — VORICONAZOLE 200 MG: 200 TABLET, FILM COATED ORAL at 08:31

## 2017-09-08 RX ADMIN — URSODIOL 250 MG: 250 TABLET ORAL at 08:31

## 2017-09-08 RX ADMIN — PREDNISONE 5 MG: 5 TABLET ORAL at 08:30

## 2017-09-08 RX ADMIN — SENNOSIDES AND DOCUSATE SODIUM 1 TABLET: 8.6; 5 TABLET ORAL at 08:31

## 2017-09-08 NOTE — PROGRESS NOTES
Patient was Discharged from [facility] so their Care Coordinators will handle patient's Post Discharge Follow up

## 2017-09-08 NOTE — PLAN OF CARE
Problem: Goal Outcome Summary  Goal: Goal Outcome Summary  Outcome: Improving  SBP hypertensive 140s-150s, OVSS. On scheduled metoprolol and hydralazine. Pt denies pain. Regular diet, tolerating well, denies nausea. PIV infiltrated last evening, no current IV in place. Voiding spont, not saving. Passing flatus, small loose BM x2 today-- one without blood, and one with scant streak of blood. UAL, steady on feet. Emotional support provided.      Discharge orders placed. Discharge instruction packet discussed with Pt, Pt verbalizes understanding and denies further questions at this time. Daughter at bedside, attentive and supportive. Pt leaving 7C in stable condition and with all belongings. Awaiting transportation to discharge pharmacy and to lobby.

## 2017-09-08 NOTE — PLAN OF CARE
Problem: Goal Outcome Summary  Goal: Goal Outcome Summary  Outcome: Improving  Assumed care of this patient at 1930.  Verbalizing feelings of frustration due to poor communication yesterday, but is eager to discharge.  Up ad ashley in room.  Denies pain except for some arthritic back pain, Tylenol administered at bedtime per patient request. Patient sleeping between cares overnight, requesting no interruptions overnight.  Voiding, not saving urine.  Reports having one small soft brown stool at bedtime, stated there was one small dot of blood and blood on the toilet paper when wiping, but no longer having streaks of red.  Wearing compression stockings during the day, PCDs on at night.  Tolerating regular diet.  No PIV.     Plan for discharge today.

## 2017-09-08 NOTE — PROGRESS NOTES
CRS update    Notified transplant hepatology team and transplant coordinator of discharge and pending CSA level today.     Mariola Orozco PA-C

## 2017-09-08 NOTE — PROGRESS NOTES
Patient states that she expects to be discharged today.  Encouraged her to check her blood pressure at least twice daily over the weekend, and send [employer] message with values Sunday evening or Monday.  She will continue to take hydralazine 12.5 mg once daily, and understands that she should take an additional 12.5 mg in the evening if her SBP >140 mmHg. She denies questions at this time.

## 2017-09-13 DIAGNOSIS — E83.19 OTHER DISORDERS OF IRON METABOLISM: ICD-10-CM

## 2017-09-13 DIAGNOSIS — Z94.4 LIVER REPLACED BY TRANSPLANT (H): ICD-10-CM

## 2017-09-13 LAB
CYCLOSPORINE BLD LC/MS/MS-MCNC: 139 UG/L (ref 50–400)
FERRITIN SERPL-MCNC: 44 NG/ML (ref 8–252)
TME LAST DOSE: NORMAL H

## 2017-09-13 PROCEDURE — 82728 ASSAY OF FERRITIN: CPT | Performed by: INTERNAL MEDICINE

## 2017-09-13 PROCEDURE — 80158 DRUG ASSAY CYCLOSPORINE: CPT | Performed by: INTERNAL MEDICINE

## 2017-09-13 PROCEDURE — 36415 COLL VENOUS BLD VENIPUNCTURE: CPT | Performed by: INTERNAL MEDICINE

## 2017-09-14 ENCOUNTER — TELEPHONE (OUTPATIENT)
Dept: TRANSPLANT | Facility: CLINIC | Age: 67
End: 2017-09-14

## 2017-09-14 DIAGNOSIS — Z94.4 LIVER REPLACED BY TRANSPLANT (H): ICD-10-CM

## 2017-09-14 RX ORDER — CYCLOSPORINE 25 MG/1
50 CAPSULE, LIQUID FILLED ORAL 2 TIMES DAILY
Qty: 360 CAPSULE | Refills: 3 | Status: SHIPPED | OUTPATIENT
Start: 2017-09-14 | End: 2017-10-23

## 2017-09-18 ENCOUNTER — MYC MEDICAL ADVICE (OUTPATIENT)
Dept: CARDIOLOGY | Facility: CLINIC | Age: 67
End: 2017-09-18

## 2017-09-18 DIAGNOSIS — Z94.4 LIVER REPLACED BY TRANSPLANT (H): ICD-10-CM

## 2017-09-18 LAB
ALBUMIN SERPL-MCNC: 3.1 G/DL (ref 3.4–5)
ALP SERPL-CCNC: 248 U/L (ref 40–150)
ALT SERPL W P-5'-P-CCNC: 150 U/L (ref 0–50)
ANION GAP SERPL CALCULATED.3IONS-SCNC: 6 MMOL/L (ref 3–14)
AST SERPL W P-5'-P-CCNC: 58 U/L (ref 0–45)
BILIRUB DIRECT SERPL-MCNC: 0.1 MG/DL (ref 0–0.2)
BILIRUB SERPL-MCNC: 0.4 MG/DL (ref 0.2–1.3)
BUN SERPL-MCNC: 36 MG/DL (ref 7–30)
CALCIUM SERPL-MCNC: 9.6 MG/DL (ref 8.5–10.1)
CHLORIDE SERPL-SCNC: 99 MMOL/L (ref 94–109)
CO2 SERPL-SCNC: 32 MMOL/L (ref 20–32)
CREAT SERPL-MCNC: 1.26 MG/DL (ref 0.52–1.04)
CYCLOSPORINE BLD LC/MS/MS-MCNC: 117 UG/L (ref 50–400)
ERYTHROCYTE [DISTWIDTH] IN BLOOD BY AUTOMATED COUNT: 17.5 % (ref 10–15)
GFR SERPL CREATININE-BSD FRML MDRD: 42 ML/MIN/1.7M2
GLUCOSE SERPL-MCNC: 108 MG/DL (ref 70–99)
HCT VFR BLD AUTO: 37.2 % (ref 35–47)
HGB BLD-MCNC: 12.2 G/DL (ref 11.7–15.7)
MAGNESIUM SERPL-MCNC: 2.2 MG/DL (ref 1.6–2.3)
MCH RBC QN AUTO: 28.3 PG (ref 26.5–33)
MCHC RBC AUTO-ENTMCNC: 32.8 G/DL (ref 31.5–36.5)
MCV RBC AUTO: 86 FL (ref 78–100)
PLATELET # BLD AUTO: 399 10E9/L (ref 150–450)
POTASSIUM SERPL-SCNC: 5 MMOL/L (ref 3.4–5.3)
PROT SERPL-MCNC: 7.9 G/DL (ref 6.8–8.8)
RBC # BLD AUTO: 4.31 10E12/L (ref 3.8–5.2)
SODIUM SERPL-SCNC: 137 MMOL/L (ref 133–144)
TME LAST DOSE: NORMAL H
WBC # BLD AUTO: 11.4 10E9/L (ref 4–11)

## 2017-09-18 PROCEDURE — 83735 ASSAY OF MAGNESIUM: CPT | Performed by: FAMILY MEDICINE

## 2017-09-18 PROCEDURE — 80076 HEPATIC FUNCTION PANEL: CPT | Performed by: INTERNAL MEDICINE

## 2017-09-18 PROCEDURE — 36415 COLL VENOUS BLD VENIPUNCTURE: CPT | Performed by: INTERNAL MEDICINE

## 2017-09-18 PROCEDURE — 80158 DRUG ASSAY CYCLOSPORINE: CPT | Performed by: INTERNAL MEDICINE

## 2017-09-18 PROCEDURE — 80048 BASIC METABOLIC PNL TOTAL CA: CPT | Performed by: INTERNAL MEDICINE

## 2017-09-18 PROCEDURE — 85027 COMPLETE CBC AUTOMATED: CPT | Performed by: FAMILY MEDICINE

## 2017-09-19 ENCOUNTER — OFFICE VISIT (OUTPATIENT)
Dept: GASTROENTEROLOGY | Facility: CLINIC | Age: 67
End: 2017-09-19
Attending: INTERNAL MEDICINE
Payer: MEDICARE

## 2017-09-19 VITALS
WEIGHT: 184.6 LBS | BODY MASS INDEX: 27.98 KG/M2 | TEMPERATURE: 97.6 F | HEIGHT: 68 IN | DIASTOLIC BLOOD PRESSURE: 82 MMHG | SYSTOLIC BLOOD PRESSURE: 159 MMHG | HEART RATE: 75 BPM

## 2017-09-19 DIAGNOSIS — Z94.4 LIVER REPLACED BY TRANSPLANT (H): Primary | ICD-10-CM

## 2017-09-19 PROCEDURE — 99212 OFFICE O/P EST SF 10 MIN: CPT | Mod: ZF

## 2017-09-19 RX ORDER — SENNOSIDES 8.6 MG
1 TABLET ORAL DAILY
COMMUNITY
End: 2017-10-23

## 2017-09-19 ASSESSMENT — PAIN SCALES - GENERAL: PAINLEVEL: NO PAIN (0)

## 2017-09-19 NOTE — PROGRESS NOTES
I had the pleasure of seeing Luz Thompson for followup in the Liver Transplant Clinic at the [facility] on 09/19/2017.  Ms. Thompson returns for followup now more than 15 years status post liver transplantation for primary biliary cirrhosis.      The major new event is that she did have a rectal perforation that occurred during a recent colonoscopy.  She was hospitalized for 8 days and fortunately did not require surgical intervention.  Dr. Montano who did the procedure had clipped the perforation.  She did have some fevers for a day afterwards but then did well.      At present, she denies any abdominal pain, itching or skin rash.  She has mild fatigue.  She denies any increased abdominal girth or lower extremity edema.  She denies any fevers or chills, cough or shortness of breath.  She denies any nausea, vomiting, diarrhea or constipation.  Her appetite is good, and her weight has been stable.      Because of the perforation, we took her off Sirolimus and put her cyclosporine, which she is on with low-dose prednisone for immunosuppression.     Current Outpatient Prescriptions   Medication     sennosides (SENOKOT) 8.6 MG tablet     cycloSPORINE modified (GENERIC EQUIVALENT) 25 MG capsule     ezetimibe (ZETIA) 10 MG tablet     metoprolol (TOPROL-XL) 50 MG 24 hr tablet     folic acid (FOLVITE) 1 MG tablet     furosemide (LASIX) 20 MG tablet     hydrALAZINE (APRESOLINE) 25 MG tablet     levothyroxine (SYNTHROID/LEVOTHROID) 150 MCG tablet     calcitRIOL (ROCALTROL) 0.5 MCG capsule     voriconazole (VFEND) 200 MG tablet     predniSONE (DELTASONE) 5 MG tablet     clotrimazole (LOTRIMIN) 1 % cream     ursodiol (ACTIGALL) 250 MG tablet     Wheat Dextrin (BENEFIBER) POWD     SUMAtriptan (IMITREX) 50 MG tablet     meclizine (ANTIVERT) 25 MG tablet     Multiple Vitamins-Minerals (PRESERVISION AREDS 2) CAPS     triamcinolone (KENALOG) 0.1 % cream     Hypromellose (ARTIFICIAL TEARS OP)      acetaminophen 500 MG CAPS     magnesium 250 MG tablet     No current facility-administered medications for this visit.      B/P: 159/82, T: 97.6, P: 75, R: Data Unavailable    HEENT exam shows no scleral icterus and no temporal muscle wasting.  Her chest is clear.  Her abdominal exam shows no increase in girth.  No masses or tenderness to palpation are present.  Liver is 10 cm in span without left lobe enlargement.  No spleen tip is palpable, and extremity exam shows no edema.  Skin exam shows no suspicious lesions.  Neurologic exam is nonfocal.     Recent Results (from the past 168 hour(s))   Ferritin    Collection Time: 09/13/17  9:19 AM   Result Value Ref Range    Ferritin 44 8 - 252 ng/mL   Cyclosporine    Collection Time: 09/13/17  9:20 AM   Result Value Ref Range    Cyclosporine Last Dose 9:30     Cyclosporine Level 139 50 - 400 ug/L   CBC with platelets    Collection Time: 09/18/17  9:21 AM   Result Value Ref Range    WBC 11.4 (H) 4.0 - 11.0 10e9/L    RBC Count 4.31 3.8 - 5.2 10e12/L    Hemoglobin 12.2 11.7 - 15.7 g/dL    Hematocrit 37.2 35.0 - 47.0 %    MCV 86 78 - 100 fl    MCH 28.3 26.5 - 33.0 pg    MCHC 32.8 31.5 - 36.5 g/dL    RDW 17.5 (H) 10.0 - 15.0 %    Platelet Count 399 150 - 450 10e9/L   Basic metabolic panel    Collection Time: 09/18/17  9:21 AM   Result Value Ref Range    Sodium 137 133 - 144 mmol/L    Potassium 5.0 3.4 - 5.3 mmol/L    Chloride 99 94 - 109 mmol/L    Carbon Dioxide 32 20 - 32 mmol/L    Anion Gap 6 3 - 14 mmol/L    Glucose 108 (H) 70 - 99 mg/dL    Urea Nitrogen 36 (H) 7 - 30 mg/dL    Creatinine 1.26 (H) 0.52 - 1.04 mg/dL    GFR Estimate 42 (L) >60 mL/min/1.7m2    GFR Estimate If Black 51 (L) >60 mL/min/1.7m2    Calcium 9.6 8.5 - 10.1 mg/dL   Hepatic panel    Collection Time: 09/18/17  9:21 AM   Result Value Ref Range    Bilirubin Direct 0.1 0.0 - 0.2 mg/dL    Bilirubin Total 0.4 0.2 - 1.3 mg/dL    Albumin 3.1 (L) 3.4 - 5.0 g/dL    Protein Total 7.9 6.8 - 8.8 g/dL    Alkaline  Phosphatase 248 (H) 40 - 150 U/L     (H) 0 - 50 U/L    AST 58 (H) 0 - 45 U/L   Magnesium    Collection Time: 09/18/17  9:21 AM   Result Value Ref Range    Magnesium 2.2 1.6 - 2.3 mg/dL   Cyclosporine    Collection Time: 09/18/17  9:22 AM   Result Value Ref Range    Cyclosporine Last Dose 9/17/17 940PM     Cyclosporine Level 117 50 - 400 ug/L      My impression is that Ms. Thompson is more than 15 years status post liver transplantation.  She has several ongoing medical problems.      The first is her perforation which should have healed completely ordinarily.  Now that she is 2 weeks post, I would be restarting her Sirolimus.  However, her kidney function is good on this dose of cyclosporine, but what has me a little bit concerned is that her transaminases are rising.  She will get them repeated in 1 week and for the time being, we will continue this dose of immunosuppression.      However, I will not start the Sirolimus because she does need a bronchoscopy to work up a pulmonary nodule.  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  That is being scheduled.        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 Yancy Montero.      My plan will be to see her back in the clinic when she returns in the spring in 8 months and will be communicating by [email or web address] in terms of putting her back on her immunosuppressive regimen.  It seems as though her Sirolimus and low dose prednisone work much better as an immunosuppressive agent both in terms of being kidney sparing and also in terms of her liver tests as well.      Thank you very much for allowing me to participate in the care of this patient.  If you have any questions regarding my recommendations, please do not hesitate to contact me.       Gentry Ramirez MD      Professor of Medicine  [employer]      Executive Medical Director, Solid Organ Transplant Program  [facility]  [facility]

## 2017-09-19 NOTE — NURSING NOTE
"Chief Complaint   Patient presents with     RECHECK     follow up on liver transplant       Initial /82  Pulse 75  Temp 97.6  F (36.4  C) (Oral)  Ht 1.715 m (5' 7.5\")  Wt 83.7 kg (184 lb 9.6 oz)  BMI 28.49 kg/m2 Estimated body mass index is 28.49 kg/(m^2) as calculated from the following:    Height as of this encounter: 1.715 m (5' 7.5\").    Weight as of this encounter: 83.7 kg (184 lb 9.6 oz).  Medication Reconciliation: complete  "

## 2017-09-19 NOTE — MR AVS SNAPSHOT
After Visit Summary   9/19/2017    Luz Thompson    MRN: [id number]           Patient Information     Date Of Birth          1950        Visit Information        Provider Department      9/19/2017 11:15 AM Gentry Ramirez MD M [facility]         Follow-ups after your visit        Follow-up notes from your care team     Return in about 8 months (around 5/19/2018).      Your next 10 appointments already scheduled     May 15, 2018 11:00 AM CDT   (Arrive by 10:45 AM)   Return General Liver with MD JERMAIN Warren [facility] ([facility])    [address] [place] [id number] [phone]              Who to contact     If you have questions or need follow up information about today's clinic visit or your schedule please contact [facility] HEPATOLOGY directly at [phone].  Normal or non-critical lab and imaging results will be communicated to you by MyChart, letter or phone within 4 business days after the clinic has received the results. If you do not hear from us within 7 days, please contact the clinic through [employer] or phone. If you have a critical or abnormal lab result, we will notify you by phone as soon as possible.  Submit refill requests through [employer] or call your pharmacy and they will forward the refill request to us. Please allow 3 business days for your refill to be completed.          Additional Information About Your Visit        MyChart Information     [employer] gives you secure access to your electronic health record. If you see a primary care provider, you can also send messages to your care team and make appointments. If you have questions, please call your primary care clinic.  If you do not have a primary care provider, please call [phone] and they will assist you.        Care EveryWhere ID     This is your Care EveryWhere ID. This could be used by other organizations to access your [facility]  "records  [id number]        Your Vitals Were     Pulse Temperature Height BMI (Body Mass Index)          75 97.6  F (36.4  C) (Oral) 1.715 m (5' 7.5\") 28.49 kg/m2         Blood Pressure from Last 3 Encounters:   09/19/17 159/82   09/08/17 145/63   08/28/17 155/75    Weight from Last 3 Encounters:   09/19/17 83.7 kg (184 lb 9.6 oz)   09/02/17 87 kg (191 lb 12.8 oz)   08/28/17 84.8 kg (187 lb)              Today, you had the following     No orders found for display         Today's Medication Changes          These changes are accurate as of: 9/19/17 11:23 AM.  If you have any questions, ask your nurse or doctor.               These medicines have changed or have updated prescriptions.        Dose/Directions    PRESERVISION AREDS 2 Caps   This may have changed:    - how much to take  - when to take this   Used for:  Dry eyes, bilateral        Dose:  2 capsule   Take 2 capsules by mouth daily   Refills:  0       triamcinolone 0.1 % cream   Commonly known as:  KENALOG   This may have changed:    - when to take this  - reasons to take this  - additional instructions   Used for:  Contact dermatitis and other eczema, due to unspecified cause        Apply topically 2 times daily Apply to rash   Quantity:  80 g   Refills:  3                Primary Care Provider Office Phone # Fax #    Jennifer Amparo Barraza [phone]       [address] [facility] [address]        Equal Access to Services     [facility] AH: Hadii aad ku hadasho Soomaali, waaxda luqadaha, qaybta kaalmada adeegyada, waxay kayla lu . So [facility] [phone].    ATENCIÓN: Si habla español, tiene a jason disposición servicios gratuitos de asistencia lingüística. Llame al [phone].    We comply with applicable federal civil rights laws and [place] laws. We do not discriminate on the basis of race, color, national origin, age, disability sex, sexual orientation or gender identity.            Thank you!     Thank you " for choosing [facility] HEPATOLOGY  for your care. Our goal is always to provide you with excellent care. Hearing back from our patients is one way we can continue to improve our services. Please take a few minutes to complete the written survey that you may receive in the mail after your visit with us. Thank you!             Your Updated Medication List - Protect others around you: Learn how to safely use, store and throw away your medicines at www.disposemymeds.org.          This list is accurate as of: 9/19/17 11:23 AM.  Always use your most recent med list.                   Brand Name Dispense Instructions for use Diagnosis    acetaminophen 500 MG Caps      Take 500 mg by mouth as needed Take 500-1,000 mg by mouth every 6 hours if needed.        ARTIFICIAL TEARS OP      Apply 1 drop to eye as needed        BENEFIBER Powd      2 teaspoons daily        calcitRIOL 0.5 MCG capsule    ROCALTROL    90 capsule    Take 1 capsule (0.5 mcg) by mouth daily        clotrimazole 1 % cream    LOTRIMIN     Apply topically 2 times daily as needed Reported on 4/25/2017        cycloSPORINE modified 25 MG capsule    GENERIC EQUIVALENT    360 capsule    Take 2 capsules (50 mg) by mouth 2 times daily    Liver replaced by transplant (H)       ezetimibe 10 MG tablet    ZETIA    30 tablet    Take 0.5 tablets (5 mg) by mouth every evening        folic acid 1 MG tablet    FOLVITE    100 tablet    Take 1 tablet (1 mg) by mouth daily    Liver replaced by transplant (H)       furosemide 20 MG tablet    LASIX    46 tablet    Take 0.5 tablets (10 mg) by mouth daily    CKD (chronic kidney disease) stage 3, GFR 30-59 ml/min, Liver replaced by transplant (H)       hydrALAZINE 25 MG tablet    APRESOLINE    270 tablet    Take 0.5 tablets (12.5 mg) by mouth 3 times daily as needed , if systolic blood pressure is more than 140 mmHg.    HTN (hypertension)       levothyroxine 150 MCG tablet    SYNTHROID/LEVOTHROID    96 tablet    Take one tablet daily 6  days a week and one and a half tablets one day a week.    Postablative hypothyroidism       magnesium 250 MG tablet      Take 2 tablets by mouth daily At night.        meclizine 25 MG tablet    ANTIVERT    30 tablet    Take 1 tablet (25 mg) by mouth as needed    Liver replaced by transplant (H)       metoprolol 50 MG 24 hr tablet    TOPROL-XL    90 tablet    Take 1 tablet (50 mg) by mouth daily    Paroxysmal atrial fibrillation (H)       predniSONE 5 MG tablet    DELTASONE    90 tablet    Take 1 tablet (5 mg) by mouth daily    Thyroid cancer (H), Liver replaced by transplant (H)       PRESERVISION AREDS 2 Caps      Take 2 capsules by mouth daily    Dry eyes, bilateral       sennosides 8.6 MG tablet    SENOKOT     Take 1 tablet by mouth daily        SUMAtriptan 50 MG tablet    IMITREX    30 tablet    Take 1 tablet (50 mg) by mouth at onset of headache for migraine repeat after 2 hours if needed.    Thyroid cancer (H)       triamcinolone 0.1 % cream    KENALOG    80 g    Apply topically 2 times daily Apply to rash    Contact dermatitis and other eczema, due to unspecified cause       ursodiol 250 MG tablet    ACTIGALL    180 tablet    Take 1 tablet (250 mg) by mouth 2 times daily    Liver replaced by transplant (H)       voriconazole 200 MG tablet    VFEND    60 tablet    Take 1 tablet (200 mg) by mouth 2 times daily    Coccidioidal pneumonitis (H)

## 2017-09-19 NOTE — LETTER
9/19/2017       RE: Luz Thompson  [address]  [place] [address]     Dear Colleague,    Thank you for referring your patient, Luz Thompson, to the [facility] HEPATOLOG[facility] at [facility]. Please see a copy of my visit note below.    I had the pleasure of seeing Luz Thompson for followup in the Liver Transplant Clinic at the [facility] on 09/19/2017.  Ms. Thompson returns for followup now more than 15 years status post liver transplantation for primary biliary cirrhosis.      The major new event is that she did have a rectal perforation that occurred during a recent colonoscopy.  She was hospitalized for 8 days and fortunately did not require surgical intervention.  Dr. Montano who did the procedure had clipped the perforation.  She did have some fevers for a day afterwards but then did well.      At present, she denies any abdominal pain, itching or skin rash.  She has mild fatigue.  She denies any increased abdominal girth or lower extremity edema.  She denies any fevers or chills, cough or shortness of breath.  She denies any nausea, vomiting, diarrhea or constipation.  Her appetite is good, and her weight has been stable.      Because of the perforation, we took her off Sirolimus and put her cyclosporine, which she is on with low-dose prednisone for immunosuppression.     Current Outpatient Prescriptions   Medication     sennosides (SENOKOT) 8.6 MG tablet     cycloSPORINE modified (GENERIC EQUIVALENT) 25 MG capsule     ezetimibe (ZETIA) 10 MG tablet     metoprolol (TOPROL-XL) 50 MG 24 hr tablet     folic acid (FOLVITE) 1 MG tablet     furosemide (LASIX) 20 MG tablet     hydrALAZINE (APRESOLINE) 25 MG tablet     levothyroxine (SYNTHROID/LEVOTHROID) 150 MCG tablet     calcitRIOL (ROCALTROL) 0.5 MCG capsule     voriconazole (VFEND) 200 MG tablet     predniSONE (DELTASONE) 5 MG tablet     clotrimazole (LOTRIMIN) 1 % cream      ursodiol (ACTIGALL) 250 MG tablet     Wheat Dextrin (BENEFIBER) POWD     SUMAtriptan (IMITREX) 50 MG tablet     meclizine (ANTIVERT) 25 MG tablet     Multiple Vitamins-Minerals (PRESERVISION AREDS 2) CAPS     triamcinolone (KENALOG) 0.1 % cream     Hypromellose (ARTIFICIAL TEARS OP)     acetaminophen 500 MG CAPS     magnesium 250 MG tablet     No current facility-administered medications for this visit.      B/P: 159/82, T: 97.6, P: 75, R: Data Unavailable    HEENT exam shows no scleral icterus and no temporal muscle wasting.  Her chest is clear.  Her abdominal exam shows no increase in girth.  No masses or tenderness to palpation are present.  Liver is 10 cm in span without left lobe enlargement.  No spleen tip is palpable, and extremity exam shows no edema.  Skin exam shows no suspicious lesions.  Neurologic exam is nonfocal.     Recent Results (from the past 168 hour(s))   Ferritin    Collection Time: 09/13/17  9:19 AM   Result Value Ref Range    Ferritin 44 8 - 252 ng/mL   Cyclosporine    Collection Time: 09/13/17  9:20 AM   Result Value Ref Range    Cyclosporine Last Dose 9:30     Cyclosporine Level 139 50 - 400 ug/L   CBC with platelets    Collection Time: 09/18/17  9:21 AM   Result Value Ref Range    WBC 11.4 (H) 4.0 - 11.0 10e9/L    RBC Count 4.31 3.8 - 5.2 10e12/L    Hemoglobin 12.2 11.7 - 15.7 g/dL    Hematocrit 37.2 35.0 - 47.0 %    MCV 86 78 - 100 fl    MCH 28.3 26.5 - 33.0 pg    MCHC 32.8 31.5 - 36.5 g/dL    RDW 17.5 (H) 10.0 - 15.0 %    Platelet Count 399 150 - 450 10e9/L   Basic metabolic panel    Collection Time: 09/18/17  9:21 AM   Result Value Ref Range    Sodium 137 133 - 144 mmol/L    Potassium 5.0 3.4 - 5.3 mmol/L    Chloride 99 94 - 109 mmol/L    Carbon Dioxide 32 20 - 32 mmol/L    Anion Gap 6 3 - 14 mmol/L    Glucose 108 (H) 70 - 99 mg/dL    Urea Nitrogen 36 (H) 7 - 30 mg/dL    Creatinine 1.26 (H) 0.52 - 1.04 mg/dL    GFR Estimate 42 (L) >60 mL/min/1.7m2    GFR Estimate If Black 51 (L) >60  mL/min/1.7m2    Calcium 9.6 8.5 - 10.1 mg/dL   Hepatic panel    Collection Time: 09/18/17  9:21 AM   Result Value Ref Range    Bilirubin Direct 0.1 0.0 - 0.2 mg/dL    Bilirubin Total 0.4 0.2 - 1.3 mg/dL    Albumin 3.1 (L) 3.4 - 5.0 g/dL    Protein Total 7.9 6.8 - 8.8 g/dL    Alkaline Phosphatase 248 (H) 40 - 150 U/L     (H) 0 - 50 U/L    AST 58 (H) 0 - 45 U/L   Magnesium    Collection Time: 09/18/17  9:21 AM   Result Value Ref Range    Magnesium 2.2 1.6 - 2.3 mg/dL   Cyclosporine    Collection Time: 09/18/17  9:22 AM   Result Value Ref Range    Cyclosporine Last Dose 9/17/17 940PM     Cyclosporine Level 117 50 - 400 ug/L      My impression is that Ms. Thompson is more than 15 years status post liver transplantation.  She has several ongoing medical problems.      The first is her perforation which should have healed completely ordinarily.  Now that she is 2 weeks post, I would be restarting her Sirolimus.  However, her kidney function is good on this dose of cyclosporine, but what has me a little bit concerned is that her transaminases are rising.  She will get them repeated in 1 week and for the time being, we will continue this dose of immunosuppression.      However, I will not start the Sirolimus because she does need a bronchoscopy to work up a pulmonary nodule.  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  That is being scheduled.        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 Yancy Montero.      My plan will be to see her back in the clinic when she returns in the spring in 8 months and will be communicating by [employer] in terms of putting her back on her immunosuppressive regimen.  It seems as though her Sirolimus and low dose prednisone work much better as an immunosuppressive agent both in terms of being kidney sparing and also in terms of her liver tests as well.      Thank you very much for allowing me to participate in the care of this  patient.  If you have any questions regarding my recommendations, please do not hesitate to contact me.       Gentry Ramirez MD      Professor of Medicine  [employer]      Executive Medical Director, Solid Organ Transplant Program  [facility]

## 2017-09-26 DIAGNOSIS — Z94.4 LIVER REPLACED BY TRANSPLANT (H): ICD-10-CM

## 2017-09-26 LAB
CYCLOSPORINE BLD LC/MS/MS-MCNC: 110 UG/L (ref 50–400)
TME LAST DOSE: NORMAL H

## 2017-09-26 PROCEDURE — 80158 DRUG ASSAY CYCLOSPORINE: CPT | Performed by: INTERNAL MEDICINE

## 2017-09-26 PROCEDURE — 36415 COLL VENOUS BLD VENIPUNCTURE: CPT | Performed by: FAMILY MEDICINE

## 2017-09-26 PROCEDURE — 80076 HEPATIC FUNCTION PANEL: CPT | Performed by: INTERNAL MEDICINE

## 2017-09-27 LAB
ALBUMIN SERPL-MCNC: 3.2 G/DL (ref 3.4–5)
ALP SERPL-CCNC: 229 U/L (ref 40–150)
ALT SERPL W P-5'-P-CCNC: 60 U/L (ref 0–50)
AST SERPL W P-5'-P-CCNC: 23 U/L (ref 0–45)
BILIRUB DIRECT SERPL-MCNC: 0.1 MG/DL (ref 0–0.2)
BILIRUB SERPL-MCNC: 0.4 MG/DL (ref 0.2–1.3)
PROT SERPL-MCNC: 7.6 G/DL (ref 6.8–8.8)

## 2017-09-28 ENCOUNTER — ANESTHESIA EVENT (OUTPATIENT)
Dept: SURGERY | Facility: CLINIC | Age: 67
End: 2017-09-28
Payer: MEDICARE

## 2017-09-29 ENCOUNTER — ANESTHESIA (OUTPATIENT)
Dept: SURGERY | Facility: CLINIC | Age: 67
End: 2017-09-29
Payer: MEDICARE

## 2017-09-29 ENCOUNTER — SURGERY (OUTPATIENT)
Age: 67
End: 2017-09-29

## 2017-09-29 ENCOUNTER — HOSPITAL ENCOUNTER (OUTPATIENT)
Facility: CLINIC | Age: 67
Discharge: HOME | End: 2017-09-29
Attending: INTERNAL MEDICINE | Admitting: INTERNAL MEDICINE
Payer: MEDICARE

## 2017-09-29 VITALS
OXYGEN SATURATION: 100 % | TEMPERATURE: 98 F | DIASTOLIC BLOOD PRESSURE: 66 MMHG | RESPIRATION RATE: 16 BRPM | HEIGHT: 67 IN | BODY MASS INDEX: 29.24 KG/M2 | SYSTOLIC BLOOD PRESSURE: 141 MMHG | WEIGHT: 186.29 LBS

## 2017-09-29 LAB
GLUCOSE BLDC GLUCOMTR-MCNC: 93 MG/DL (ref 70–99)
POTASSIUM SERPL-SCNC: 4 MMOL/L (ref 3.4–5.3)

## 2017-09-29 PROCEDURE — 84132 ASSAY OF SERUM POTASSIUM: CPT | Performed by: ANESTHESIOLOGY

## 2017-09-29 PROCEDURE — 88172 CYTP DX EVAL FNA 1ST EA SITE: CPT | Performed by: INTERNAL MEDICINE

## 2017-09-29 PROCEDURE — 93010 ELECTROCARDIOGRAM REPORT: CPT | Performed by: INTERNAL MEDICINE

## 2017-09-29 PROCEDURE — 93005 ELECTROCARDIOGRAM TRACING: CPT

## 2017-09-29 PROCEDURE — 25000128 H RX IP 250 OP 636: Performed by: NURSE ANESTHETIST, CERTIFIED REGISTERED

## 2017-09-29 PROCEDURE — 25000125 ZZHC RX 250: Performed by: ANESTHESIOLOGY

## 2017-09-29 PROCEDURE — C9399 UNCLASSIFIED DRUGS OR BIOLOG: HCPCS | Performed by: NURSE ANESTHETIST, CERTIFIED REGISTERED

## 2017-09-29 PROCEDURE — 36000062 ZZH SURGERY LEVEL 4 1ST 30 MIN - UMMC: Performed by: INTERNAL MEDICINE

## 2017-09-29 PROCEDURE — 71000016 ZZH RECOVERY PHASE 1 LEVEL 3 FIRST HR: Performed by: INTERNAL MEDICINE

## 2017-09-29 PROCEDURE — 82962 GLUCOSE BLOOD TEST: CPT

## 2017-09-29 PROCEDURE — 25000125 ZZHC RX 250: Performed by: NURSE ANESTHETIST, CERTIFIED REGISTERED

## 2017-09-29 PROCEDURE — 88173 CYTOPATH EVAL FNA REPORT: CPT | Performed by: INTERNAL MEDICINE

## 2017-09-29 PROCEDURE — 27210794 ZZH OR GENERAL SUPPLY STERILE: Performed by: INTERNAL MEDICINE

## 2017-09-29 PROCEDURE — 40000170 ZZH STATISTIC PRE-PROCEDURE ASSESSMENT II: Performed by: INTERNAL MEDICINE

## 2017-09-29 PROCEDURE — 00000155 ZZHCL STATISTIC H-CELL BLOCK W/STAIN: Performed by: INTERNAL MEDICINE

## 2017-09-29 PROCEDURE — 88305 TISSUE EXAM BY PATHOLOGIST: CPT | Performed by: INTERNAL MEDICINE

## 2017-09-29 PROCEDURE — 71000027 ZZH RECOVERY PHASE 2 EACH 15 MINS: Performed by: INTERNAL MEDICINE

## 2017-09-29 PROCEDURE — 37000009 ZZH ANESTHESIA TECHNICAL FEE, EACH ADDTL 15 MIN: Performed by: INTERNAL MEDICINE

## 2017-09-29 PROCEDURE — 37000008 ZZH ANESTHESIA TECHNICAL FEE, 1ST 30 MIN: Performed by: INTERNAL MEDICINE

## 2017-09-29 PROCEDURE — 88312 SPECIAL STAINS GROUP 1: CPT | Performed by: INTERNAL MEDICINE

## 2017-09-29 PROCEDURE — 36415 COLL VENOUS BLD VENIPUNCTURE: CPT | Performed by: ANESTHESIOLOGY

## 2017-09-29 PROCEDURE — 25000308 HC RX OP HPI UCR WEL MED 250 IP 250: Performed by: NURSE ANESTHETIST, CERTIFIED REGISTERED

## 2017-09-29 PROCEDURE — 36000064 ZZH SURGERY LEVEL 4 EA 15 ADDTL MIN - UMMC: Performed by: INTERNAL MEDICINE

## 2017-09-29 PROCEDURE — 40000065 ZZH STATISTIC EKG NON-CHARGEABLE

## 2017-09-29 RX ORDER — FENTANYL CITRATE 50 UG/ML
25-50 INJECTION, SOLUTION INTRAMUSCULAR; INTRAVENOUS
Status: DISCONTINUED | OUTPATIENT
Start: 2017-09-29 | End: 2017-09-29 | Stop reason: HOSPADM

## 2017-09-29 RX ORDER — SODIUM CHLORIDE, SODIUM LACTATE, POTASSIUM CHLORIDE, CALCIUM CHLORIDE 600; 310; 30; 20 MG/100ML; MG/100ML; MG/100ML; MG/100ML
INJECTION, SOLUTION INTRAVENOUS CONTINUOUS
Status: DISCONTINUED | OUTPATIENT
Start: 2017-09-29 | End: 2017-09-29 | Stop reason: HOSPADM

## 2017-09-29 RX ORDER — ONDANSETRON 2 MG/ML
4 INJECTION INTRAMUSCULAR; INTRAVENOUS EVERY 30 MIN PRN
Status: DISCONTINUED | OUTPATIENT
Start: 2017-09-29 | End: 2017-09-29 | Stop reason: HOSPADM

## 2017-09-29 RX ORDER — PROPOFOL 10 MG/ML
INJECTION, EMULSION INTRAVENOUS CONTINUOUS PRN
Status: DISCONTINUED | OUTPATIENT
Start: 2017-09-29 | End: 2017-09-29

## 2017-09-29 RX ORDER — FENTANYL CITRATE 50 UG/ML
INJECTION, SOLUTION INTRAMUSCULAR; INTRAVENOUS PRN
Status: DISCONTINUED | OUTPATIENT
Start: 2017-09-29 | End: 2017-09-29

## 2017-09-29 RX ORDER — ONDANSETRON 4 MG/1
4 TABLET, ORALLY DISINTEGRATING ORAL EVERY 30 MIN PRN
Status: DISCONTINUED | OUTPATIENT
Start: 2017-09-29 | End: 2017-09-29 | Stop reason: HOSPADM

## 2017-09-29 RX ORDER — DEXAMETHASONE SODIUM PHOSPHATE 4 MG/ML
INJECTION, SOLUTION INTRA-ARTICULAR; INTRALESIONAL; INTRAMUSCULAR; INTRAVENOUS; SOFT TISSUE PRN
Status: DISCONTINUED | OUTPATIENT
Start: 2017-09-29 | End: 2017-09-29

## 2017-09-29 RX ORDER — LABETALOL HYDROCHLORIDE 5 MG/ML
10 INJECTION, SOLUTION INTRAVENOUS
Status: DISCONTINUED | OUTPATIENT
Start: 2017-09-29 | End: 2017-09-29 | Stop reason: HOSPADM

## 2017-09-29 RX ORDER — ONDANSETRON 2 MG/ML
INJECTION INTRAMUSCULAR; INTRAVENOUS PRN
Status: DISCONTINUED | OUTPATIENT
Start: 2017-09-29 | End: 2017-09-29

## 2017-09-29 RX ORDER — ALBUTEROL SULFATE 0.83 MG/ML
2.5 SOLUTION RESPIRATORY (INHALATION) ONCE
Status: COMPLETED | OUTPATIENT
Start: 2017-09-29 | End: 2017-09-29

## 2017-09-29 RX ORDER — SODIUM CHLORIDE, SODIUM LACTATE, POTASSIUM CHLORIDE, CALCIUM CHLORIDE 600; 310; 30; 20 MG/100ML; MG/100ML; MG/100ML; MG/100ML
INJECTION, SOLUTION INTRAVENOUS CONTINUOUS PRN
Status: DISCONTINUED | OUTPATIENT
Start: 2017-09-29 | End: 2017-09-29

## 2017-09-29 RX ORDER — NALOXONE HYDROCHLORIDE 0.4 MG/ML
.1-.4 INJECTION, SOLUTION INTRAMUSCULAR; INTRAVENOUS; SUBCUTANEOUS
Status: DISCONTINUED | OUTPATIENT
Start: 2017-09-29 | End: 2017-09-29 | Stop reason: HOSPADM

## 2017-09-29 RX ORDER — HYDRALAZINE HYDROCHLORIDE 20 MG/ML
2.5-5 INJECTION INTRAMUSCULAR; INTRAVENOUS EVERY 10 MIN PRN
Status: DISCONTINUED | OUTPATIENT
Start: 2017-09-29 | End: 2017-09-29 | Stop reason: HOSPADM

## 2017-09-29 RX ORDER — LIDOCAINE HYDROCHLORIDE 20 MG/ML
INJECTION, SOLUTION INFILTRATION; PERINEURAL PRN
Status: DISCONTINUED | OUTPATIENT
Start: 2017-09-29 | End: 2017-09-29

## 2017-09-29 RX ORDER — LIDOCAINE 40 MG/G
CREAM TOPICAL
Status: DISCONTINUED | OUTPATIENT
Start: 2017-09-29 | End: 2017-09-29 | Stop reason: HOSPADM

## 2017-09-29 RX ORDER — LIDOCAINE HYDROCHLORIDE 40 MG/ML
INJECTION, SOLUTION RETROBULBAR PRN
Status: DISCONTINUED | OUTPATIENT
Start: 2017-09-29 | End: 2017-09-29

## 2017-09-29 RX ORDER — PROPOFOL 10 MG/ML
INJECTION, EMULSION INTRAVENOUS PRN
Status: DISCONTINUED | OUTPATIENT
Start: 2017-09-29 | End: 2017-09-29

## 2017-09-29 RX ADMIN — MIDAZOLAM HYDROCHLORIDE 1 MG: 1 INJECTION, SOLUTION INTRAMUSCULAR; INTRAVENOUS at 10:02

## 2017-09-29 RX ADMIN — ROCURONIUM BROMIDE 50 MG: 10 INJECTION INTRAVENOUS at 10:08

## 2017-09-29 RX ADMIN — ONDANSETRON 4 MG: 2 INJECTION INTRAMUSCULAR; INTRAVENOUS at 10:37

## 2017-09-29 RX ADMIN — FENTANYL CITRATE 100 MCG: 50 INJECTION, SOLUTION INTRAMUSCULAR; INTRAVENOUS at 10:08

## 2017-09-29 RX ADMIN — ALBUTEROL SULFATE 2.5 MG: 2.5 SOLUTION RESPIRATORY (INHALATION) at 11:32

## 2017-09-29 RX ADMIN — LIDOCAINE HYDROCHLORIDE 5 ML: 40 INJECTION, SOLUTION RETROBULBAR; TOPICAL at 10:09

## 2017-09-29 RX ADMIN — DEXAMETHASONE SODIUM PHOSPHATE 4 MG: 4 INJECTION, SOLUTION INTRA-ARTICULAR; INTRALESIONAL; INTRAMUSCULAR; INTRAVENOUS; SOFT TISSUE at 10:08

## 2017-09-29 RX ADMIN — PROPOFOL 50 MG: 10 INJECTION, EMULSION INTRAVENOUS at 10:23

## 2017-09-29 RX ADMIN — PHENYLEPHRINE HYDROCHLORIDE 100 MCG: 10 INJECTION, SOLUTION INTRAMUSCULAR; INTRAVENOUS; SUBCUTANEOUS at 10:36

## 2017-09-29 RX ADMIN — LIDOCAINE HYDROCHLORIDE 40 MG: 20 INJECTION, SOLUTION INFILTRATION; PERINEURAL at 10:01

## 2017-09-29 RX ADMIN — SODIUM CHLORIDE, POTASSIUM CHLORIDE, SODIUM LACTATE AND CALCIUM CHLORIDE: 600; 310; 30; 20 INJECTION, SOLUTION INTRAVENOUS at 10:03

## 2017-09-29 RX ADMIN — SUGAMMADEX 200 MG: 100 INJECTION, SOLUTION INTRAVENOUS at 10:58

## 2017-09-29 RX ADMIN — PROPOFOL 150 MCG/KG/MIN: 10 INJECTION, EMULSION INTRAVENOUS at 10:11

## 2017-09-29 RX ADMIN — MIDAZOLAM HYDROCHLORIDE 1 MG: 1 INJECTION, SOLUTION INTRAMUSCULAR; INTRAVENOUS at 10:01

## 2017-09-29 RX ADMIN — PROPOFOL 150 MG: 10 INJECTION, EMULSION INTRAVENOUS at 10:08

## 2017-09-29 RX ADMIN — LIDOCAINE HYDROCHLORIDE 40 MG: 20 INJECTION, SOLUTION INFILTRATION; PERINEURAL at 10:08

## 2017-09-29 RX ADMIN — SODIUM CHLORIDE, POTASSIUM CHLORIDE, SODIUM LACTATE AND CALCIUM CHLORIDE: 600; 310; 30; 20 INJECTION, SOLUTION INTRAVENOUS at 10:14

## 2017-09-29 ASSESSMENT — LIFESTYLE VARIABLES: TOBACCO_USE: 1

## 2017-09-29 ASSESSMENT — ENCOUNTER SYMPTOMS: DYSRHYTHMIAS: 1

## 2017-09-29 NOTE — OP NOTE
I reviewed the history and physical examination from 9/8/17 and confirm the findings.        Procedure:   Flexible Bronchoscopy with EBUS        Indication:   Mediastinal/Hilar lymph node enlargement, lingula nodule        Consent:   Obtained from the patient/family. I have discussed the procedure and its potential complications as well as the alternatives for diagnosis.  Risks, benefits and alternatives discussed. Risks include bleeding, infection, respiratory failure, vocal cord trauma/paralysis and pneumothorax.  In general, severe complications and death with bronchoscopy described as 0.6% and 0.013%, respectively.  Additional risks are related to conscious sedation/general anesthesia involve bradycardia, arrhythmia, hypotension.  The patient is in agreement to proceed with the procedure.  There was no contraindication for the procedure.       Pre-medication:   The procedure is done under general anesthesia in the OR. Please see anesthesia/OR notes.          (s):   MD ANTHONY Carreon. Sriram Cintron MD     I have personally performed the entire procedure        Procedure Summary:   Time out was performed.     Pre-procedure evaluation was done including systemic physical examination and no contraindication either for general anesthesia / conscious sedation or flexible diagnostic bronchoscopy.   Physical examination: B/P: 167/77, T: 98, P: Data Unavailable, R: 16  Alert, oriented x 3, neck supple, lungs clear to auscultation, normal heart sounds without murmur, abdomen soft, non tender, no motor deficit, pupils are midline/isocoric, mood and affect are normal.    The patient was orally intubated by anesthesia with a cuffed ET tube size # 8.0, under general anesthesia and on supine position.    The patient was orally intubated by me prior to procedure through the bronchoscope / with Glydescope with a cuffless ET tube size # 8.0 without difficulty.    Airway examination:The flexible bronchoscope (Olympus)  introduced through the ET tube and a complete airway examination was performed from the distal trachea to the subsegmental level in each lobe of both lungs. There was no endobronchial lesion.  *through the mouth guard. Upper airway structures, vocal cords (anatomy and function) appear to be normal. Exam of trachea and bronchus of the right and left bronchial tree to the sub-segmental level revealed no endobronchial lesion.    EBUS-guided transbronchial lymph node biopsies:The EBUS scope (Olympus) inserted through the ET tube / mouth guard to the mid trachea and lymph node station (s) * localized. Each lymph node station was accessed with needle size 21 gauge three times real time under US guidance and 10-15 passes performed while suction applied.    The procedure performed with rapid on-site cytologic evaluation.    Samples:  Station # 4L  1 lymph nodes, <1cm, not sampled for technical reasons    Station # [id number]  1 lymph nodes, <1cm, 6 samples submitted in formalin    Once the adequate lymph node sampling/preliminary diagnosis achieved, the EBUS sampling completed.    At the end, flexible bronchoscopy introduced to the airway for airway examination, suctioning blood and secretions. The procedure is completed with final airway examination revealing no bleeding.    The patient tolerated the procedure well without undue discomfort, hypotension or arrhythmia. The procedure was performed in the OR/Endoscopy suite.    EBL: minimal    Preliminary diagnosis: adequate lymphoid tissue, no lesional findings    Examination of the equipment:  The EBUS biopsy needle examined before and after usage. The needle functioned well and was intact post procedure.    The case and the preliminary results are discussed with the family member in the Family Lounge.          Complications:   No immediate complications.  Samples sent for: cytology         Disposition:   Home / admit to hospital / remains in the hospital for further care          Recommendations:   Await final cytology/pathology and culture results    Eden Clinton MD

## 2017-09-29 NOTE — OR NURSING
"Pt expressing questions regarding preliminary pathology. Writer contacted Dr. Clinton. She stated, \"I have talked to her . Tell her that so far we have only good news, and I'll see her in clinic next week.\" This was relayed to patient, and she verbalized her understanding, and stated she does not have further questions at this time.   "

## 2017-09-29 NOTE — ANESTHESIA PREPROCEDURE EVALUATION
Anesthesia Evaluation     . Pt has had prior anesthetic. Type of anesthetic: Had migraine headache with thyroid surgery due to lack of caffeine.    No history of anesthetic complications          ROS/MED HX    ENT/Pulmonary:     (+)tobacco use, Past use , recent URI . .    Neurologic:     (+)migraines, CVA without deficits    Cardiovascular:     (+) hypertension----. : . . . :. dysrhythmias (paroxysmal) a-fib, valvular problems/murmurs .       METS/Exercise Tolerance:  >4 METS   Hematologic:     (+) History of blood clots pt is not anticoagulated, Anemia, -      Musculoskeletal:         GI/Hepatic:     (+) liver disease (PBC s/p liver transplant with good liver function now.), Other GI/Hepatic esophageal varices      Renal/Genitourinary:     (+) chronic renal disease, type: CRI, Pt does not require dialysis, Pt has no history of transplant,       Endo:     (+) thyroid problem hypothyroidism, Chronic steroid usage for Post Transplant Immunosuppression Date most recently used: today,.      Psychiatric:     (+) psychiatric history anxiety      Infectious Disease:         Malignancy:   (+) Malignancy History of Lung  Lung mass suspicious for malignancy        Other:                   Procedure: Procedure(s):  Flexible Bronchoscopy, Endobronchial Ultrasound, Transbronchial Needle Aspiration  - Wound Class: II-Clean Contaminated   - Wound Class: II-Clean Contaminated    HPI: Luz Thompson is a 67 year old female with lung mass concerning for malignancy and scheduled for above procedure.    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 Gentry Ramirez, [facility]       Macular degeneration      Migraines 4/4/2012     Osteoarthritis of right knee 8/2/2012     Osteoporosis 4/20/2012     Paroxysmal a-fib (H) 6/13/2017     Post-surgical hypothyroidism      Postablative hypothyroidism 8/13/2012     Primary biliary cirrhosis (H)     s/p Liver transplant, [id number]     Sjogren's syndrome (H)      Statin intolerance      Unspecified cerebral artery occlusion with cerebral infarction 2001    when BP was very low, small multiple infacts in frontal lobe, had \"visual field cut,\" leg weakness, and expressive aphasia - all have resolved.      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       Past Surgical History:   Procedure Laterality Date     APPENDECTOMY  1961     BIOPSY       CATARACT IOL, RT/LT      RE12/19/2013, LE12/10/2013 - Toric lenses     CHOLECYSTECTOMY  1991     COLONOSCOPY       COLONOSCOPY  3/10/2014    Procedure: COLONOSCOPY;;  Surgeon: Gentry Ramirez MD;  Location:  GI     ENDOSCOPIC RETROGRADE CHOLANGIOPANCREATOGRAM  9/19/2013    Procedure: ENDOSCOPIC RETROGRADE CHOLANGIOPANCREATOGRAM;  Endoscopic Retrograde Cholangiopancreatogram with single balloon enteroscopy, ballon sweep of bile duct;  Surgeon: Brett Membreno MD;  Location:  OR     ENT SURGERY      ear drum repair     HC KNEE SCOPE,MED/LAT MENISECTOMY  8/10/12    Right, partial medial menisectomy only     KNEE SURGERY  1966    R knee     PICC INSERTION  9/18/2013    4fr SL PASV PICC, 40cm (1cm external) in the R basilic vein w/ tip in the low SVC     PICC INSERTION  " 2/21/2014    5 fr DL BioFlo Navilyst PICC, 46 cm (3 cm external) in the L basilic vein w/ tip in the SVC RA junction.     THYROIDECTOMY  3/2010     TRANSPLANT LIVER RECIPIENT LIVING UNRELATED           No current facility-administered medications on file prior to encounter.   Current Outpatient Prescriptions on File Prior to Encounter:  sennosides (SENOKOT) 8.6 MG tablet Take 1 tablet by mouth daily   cycloSPORINE modified (GENERIC EQUIVALENT) 25 MG capsule Take 2 capsules (50 mg) by mouth 2 times daily   ezetimibe (ZETIA) 10 MG tablet Take 0.5 tablets (5 mg) by mouth every evening   metoprolol (TOPROL-XL) 50 MG 24 hr tablet Take 1 tablet (50 mg) by mouth daily   folic acid (FOLVITE) 1 MG tablet Take 1 tablet (1 mg) by mouth daily   furosemide (LASIX) 20 MG tablet Take 0.5 tablets (10 mg) by mouth daily   hydrALAZINE (APRESOLINE) 25 MG tablet Take 0.5 tablets (12.5 mg) by mouth 3 times daily as needed , if systolic blood pressure is more than 140 mmHg.   levothyroxine (SYNTHROID/LEVOTHROID) 150 MCG tablet Take one tablet daily 6 days a week and one and a half tablets one day a week.   calcitRIOL (ROCALTROL) 0.5 MCG capsule Take 1 capsule (0.5 mcg) by mouth daily   voriconazole (VFEND) 200 MG tablet Take 1 tablet (200 mg) by mouth 2 times daily   predniSONE (DELTASONE) 5 MG tablet Take 1 tablet (5 mg) by mouth daily   ursodiol (ACTIGALL) 250 MG tablet Take 1 tablet (250 mg) by mouth 2 times daily   Wheat Dextrin (BENEFIBER) POWD 2 teaspoons daily   SUMAtriptan (IMITREX) 50 MG tablet Take 1 tablet (50 mg) by mouth at onset of headache for migraine repeat after 2 hours if needed.   Multiple Vitamins-Minerals (PRESERVISION AREDS 2) CAPS Take 2 capsules by mouth daily (Patient taking differently: Take 1 capsule by mouth 2 times daily )   Hypromellose (ARTIFICIAL TEARS OP) Apply 1 drop to eye as needed   acetaminophen 500 MG CAPS Take 500 mg by mouth as needed Take 500-1,000 mg by mouth every 6 hours if needed.  "  magnesium 250 MG tablet Take 2 tablets by mouth daily At night.    clotrimazole (LOTRIMIN) 1 % cream Apply topically 2 times daily as needed Reported on 4/25/2017   meclizine (ANTIVERT) 25 MG tablet Take 1 tablet (25 mg) by mouth as needed   triamcinolone (KENALOG) 0.1 % cream Apply topically 2 times daily Apply to rash (Patient taking differently: Apply topically 2 times daily as needed Apply to rash)       Social Hx:   Social History   Substance Use Topics     Smoking status: Former Smoker     Packs/day: 1.00     Years: 18.00     Types: Cigarettes     Quit date: 4/12/1985     Smokeless tobacco: Never Used     Alcohol use 0.0 oz/week     0 Standard drinks or equivalent per week      Comment: rare - \"I toast at weddings\"       Allergies:   Allergies   Allergen Reactions     Fluconazole Hives and Itching     Azithromycin Itching     Benadryl [Diphenhydramine Hcl]      Insomnia      Cefpodoxime Itching     Cellcept Diarrhea     Ciprofloxacin Other (See Comments)     Insomnia, mood lability     Codeine      Psych disturbance     Diphenhydramine Other (See Comments)     Doxycycline      Lansoprazole Diarrhea     Levaquin [Levofloxacin] Other (See Comments)     Headache, hyperactivity     Lisinopril Cough     Methotrexate      Sores     Morphine Itching     Morphine Sulfate Itching     Mycophenolate Diarrhea     Pravastatin Muscle Pain (Myalgia)     Simvastatin Muscle Pain (Myalgia)     severe     Cephalexin Rash     Fever and skin burning     Penicillin G Itching and Rash     Tolectin [Nsaids] Rash     Tramadol Rash         NPO Status: Per ASA Guidelines    Labs:    Blood Bank:  Lab Results   Component Value Date    ABO O 08/31/2017    RH Pos 08/31/2017    AS Neg 08/31/2017     BMP:  Recent Labs   Lab Test  09/29/17   0854  09/18/17   0921   NA   --   137   POTASSIUM  4.0  5.0   CHLORIDE   --   99   CO2   --   32   BUN   --   36*   CR   --   1.26*   GLC   --   108*   KEILY   --   9.6     CBC:   Recent Labs   Lab Test  " 09/18/17   0921   WBC  11.4*   RBC  4.31   HGB  12.2   HCT  37.2   MCV  86   MCH  28.3   MCHC  32.8   RDW  17.5*   PLT  399     Coags:  Recent Labs   Lab Test  05/16/16   0827  05/13/16   0940   INR  1.02  1.11   PTT   --   33         Physical Exam  Normal systems: cardiovascular and pulmonary    Airway   Mallampati: II  TM distance: >3 FB  Neck ROM: full    Dental   (+) upper dentures and lower dentures    Cardiovascular       Pulmonary                     Anesthesia Plan      History & Physical Review  History and physical reviewed and following examination; no interval change.    ASA Status:  3 .    NPO Status:  > 6 hours    Plan for General and ETT with Intravenous and Propofol induction. Maintenance will be TIVA.    PONV prophylaxis:  Dexamethasone or Solumedrol and Ondansetron (or other 5HT-3)       Postoperative Care  Postoperative pain management:  IV analgesics.      Consents  Anesthetic plan, risks, benefits and alternatives discussed with:  Patient.  Use of blood products discussed: Yes.   Use of blood products discussed with Patient.  Consented to blood products.  .                  History and physical assessed; Patient examined.   Risks and alternatives presented and discussed. Patient and family agree. All questions answered.      Thomas Luke MD  Staff Anesthesiologist  *[phone]

## 2017-09-29 NOTE — ANESTHESIA POSTPROCEDURE EVALUATION
Patient: Luz Thompson    Procedure(s):  Flexible Bronchoscopy, Endobronchial Ultrasound, Transbronchial Needle Aspiration  - Wound Class: II-Clean Contaminated   - Wound Class: II-Clean Contaminated    Diagnosis:Enlarged Lymph Nodes   Diagnosis Additional Information: No value filed.    Anesthesia Type:  General, ETT    Note:  Anesthesia Post Evaluation    Patient location during evaluation: PACU  Patient participation: Able to fully participate in evaluation  Level of consciousness: awake and alert  Pain management: adequate  Airway patency: patent  Cardiovascular status: acceptable  Respiratory status: acceptable, room air and nonlabored ventilation  Hydration status: acceptable  PONV: none     Anesthetic complications: None    Comments: Patient's cough improved after albuterol nebulizer in PACU        Last vitals:  Vitals:    09/29/17 1115 09/29/17 1130 09/29/17 1145   BP: 129/86 173/73 149/66   Resp: 16 16 16   Temp:   36.6  C (97.9  F)   SpO2: 100% 100% 98%         Electronically Signed By: Thomas Luke MD  September 29, 2017  11:55 AM

## 2017-09-29 NOTE — DISCHARGE INSTRUCTIONS
[facility]  Same-Day Surgery   Adult Discharge Orders & Instructions     For 24 hours after surgery    1. Get plenty of rest.  A responsible adult must stay with you for at least 24 hours after you leave the hospital.   2. Do not drive or use heavy equipment.  If you have weakness or tingling, don't drive or use heavy equipment until this feeling goes away.  3. Do not drink alcohol.  4. Avoid strenuous or risky activities.  Ask for help when climbing stairs.   5. You may feel lightheaded.  IF so, sit for a few minutes before standing.  Have someone help you get up.   6. If you have nausea (feel sick to your stomach): Drink only clear liquids such as apple juice, ginger ale, broth or 7-Up.  Rest may also help.  Be sure to drink enough fluids.  Move to a regular diet as you feel able.  7. You may have a slight fever. Call the doctor if your fever is over 100 F (37.7 C) (taken under the tongue) or lasts longer than 24 hours.  8. You may have a dry mouth, a sore throat, muscle aches or trouble sleeping.  These should go away after 24 hours.  9. Do not make important or legal decisions.   Call your doctor for any of the followin.  Signs of infection (fever, growing tenderness at the surgery site, a large amount of drainage or bleeding, severe pain, foul-smelling drainage, redness, swelling).    2. It has been over 8 to 10 hours since surgery and you are still not able to urinate (pass water).    3.  Headache for over 24 hours.    To contact a doctor during clinic hours: call Dr Clinton's office at [phone]  or:        After hours: [phone] and ask for the resident on call for Pulmonology (answered 24 hours a day)      Emergency Department:    [facility]: [phone]       (TTY for hearing impaired: [phone])

## 2017-09-29 NOTE — IP AVS SNAPSHOT
MRN:[id number]                      After Visit Summary   9/29/2017    Luz Thompson    MRN: [id number]           Thank you!     Thank you for choosing [facility] for your care. Our goal is always to provide you with excellent care. Hearing back from our patients is one way we can continue to improve our services. Please take a few minutes to complete the written survey that you may receive in the mail after you visit with us. Thank you!        Patient Information     Date Of Birth          1950        About your hospital stay     You were admitted on:  September 29, 2017 You last received care in the:  Same Day Surgery [facility]    You were discharged on:  September 29, 2017       Who to Call     For medical emergencies, please call 911.  For non-urgent questions about your medical care, please call your primary care provider or clinic, [phone]  For questions related to your surgery, please call your surgery clinic        Attending Provider     Provider Specialty    Eden Clinton MD Pulmonary       Primary Care Provider Office Phone # Fax #    Jennifer Amparo Barraza [phone]      After Care Instructions     Discharge Instructions       Patient to follow up with appointment in 3 months with CT chest                  Your next 10 appointments already scheduled     May 15, 2018 11:00 AM CDT   (Arrive by 10:45 AM)   Return General Liver with Gentry Ramirez MD   [facility] ([facility])    [address] [place] [id number] [phone]              Further instructions from your care team       [facility]  Same-Day Surgery   Adult Discharge Orders & Instructions     For 24 hours after surgery    1. Get plenty of rest.  A responsible adult must stay with you for at least 24 hours after you leave the hospital.   2. Do not drive or use heavy equipment.  If you have  weakness or tingling, don't drive or use heavy equipment until this feeling goes away.  3. Do not drink alcohol.  4. Avoid strenuous or risky activities.  Ask for help when climbing stairs.   5. You may feel lightheaded.  IF so, sit for a few minutes before standing.  Have someone help you get up.   6. If you have nausea (feel sick to your stomach): Drink only clear liquids such as apple juice, ginger ale, broth or 7-Up.  Rest may also help.  Be sure to drink enough fluids.  Move to a regular diet as you feel able.  7. You may have a slight fever. Call the doctor if your fever is over 100 F (37.7 C) (taken under the tongue) or lasts longer than 24 hours.  8. You may have a dry mouth, a sore throat, muscle aches or trouble sleeping.  These should go away after 24 hours.  9. Do not make important or legal decisions.   Call your doctor for any of the followin.  Signs of infection (fever, growing tenderness at the surgery site, a large amount of drainage or bleeding, severe pain, foul-smelling drainage, redness, swelling).    2. It has been over 8 to 10 hours since surgery and you are still not able to urinate (pass water).    3.  Headache for over 24 hours.    To contact a doctor during clinic hours: call Dr Clinton's office at [phone]  or:        After hours: [phone] and ask for the resident on call for Pulmonology (answered 24 hours a day)      Emergency Department:    [facility]: [phone]       (TTY for hearing impaired: [phone])        Pending Results     Date and Time Order Name Status Description    2017 1030 Fine needle aspiration In process     2017 0835 EKG 12-lead, tracing only Preliminary             Admission Information     Date & Time Provider Department Dept. Phone    2017 Eden Clinton MD Same Day Surgery [facility] [phone]      Your Vitals Were     Blood Pressure Temperature Respirations Height Weight Pulse Oximetry    141/66 98  F (36.7  " C) (Oral) 16 1.714 m (5' 7.48\") 84.5 kg (186 lb 4.6 oz) 100%    BMI (Body Mass Index)                   28.76 kg/m2           [employer] Information     [employer] gives you secure access to your electronic health record. If you see a primary care provider, you can also send messages to your care team and make appointments. If you have questions, please call your primary care clinic.  If you do not have a primary care provider, please call [phone] and they will assist you.        Care EveryWhere ID     This is your Care EveryWhere ID. This could be used by other organizations to access your [facility] medical records  [id number]        Equal Access to Services     GENE NOWAK : Valerie Ma, mercy denny, dian brizuela, etta hardy. So [facility] [phone].    ATENCIÓN: Si habla español, tiene a jason disposición servicios gratuitos de asistencia lingüística. Llame al [phone].    We comply with applicable federal civil rights laws and [place] laws. We do not discriminate on the basis of race, color, national origin, age, disability, sex, sexual orientation, or gender identity.               Review of your medicines      CONTINUE these medicines which may have CHANGED, or have new prescriptions. If we are uncertain of the size of tablets/capsules you have at home, strength may be listed as something that might have changed.        Dose / Directions    PRESERVISION AREDS 2 Caps   This may have changed:    - how much to take  - when to take this   Used for:  Dry eyes, bilateral        Dose:  2 capsule   Take 2 capsules by mouth daily   Refills:  0       triamcinolone 0.1 % cream   Commonly known as:  KENALOG   This may have changed:    - when to take this  - reasons to take this  - additional instructions   Used for:  Contact dermatitis and other eczema, due to unspecified cause        Apply topically 2 times daily Apply to rash   Quantity:  80 g "   Refills:  3         CONTINUE these medicines which have NOT CHANGED        Dose / Directions    acetaminophen 500 MG Caps        Dose:  500 mg   Take 500 mg by mouth as needed Take 500-1,000 mg by mouth every 6 hours if needed.   Refills:  0       ARTIFICIAL TEARS OP        Dose:  1 drop   Apply 1 drop to eye as needed   Refills:  0       BENEFIBER Powd        2 teaspoons daily   Refills:  0       calcitRIOL 0.5 MCG capsule   Commonly known as:  ROCALTROL        Dose:  0.5 mcg   Take 1 capsule (0.5 mcg) by mouth daily   Quantity:  90 capsule   Refills:  3       clotrimazole 1 % cream   Commonly known as:  LOTRIMIN        Apply topically 2 times daily as needed Reported on 4/25/2017   Refills:  0       cycloSPORINE modified 25 MG capsule   Commonly known as:  GENERIC EQUIVALENT   Used for:  Liver replaced by transplant (H)        Dose:  50 mg   Take 2 capsules (50 mg) by mouth 2 times daily   Quantity:  360 capsule   Refills:  3       ezetimibe 10 MG tablet   Commonly known as:  ZETIA        Dose:  5 mg   Take 0.5 tablets (5 mg) by mouth every evening   Quantity:  30 tablet   Refills:  0       folic acid 1 MG tablet   Commonly known as:  FOLVITE   Used for:  Liver replaced by transplant (H)        Dose:  1 mg   Take 1 tablet (1 mg) by mouth daily   Quantity:  100 tablet   Refills:  3       furosemide 20 MG tablet   Commonly known as:  LASIX   Used for:  CKD (chronic kidney disease) stage 3, GFR 30-59 ml/min, Liver replaced by transplant (H)        Dose:  10 mg   Take 0.5 tablets (10 mg) by mouth daily   Quantity:  46 tablet   Refills:  3       hydrALAZINE 25 MG tablet   Commonly known as:  APRESOLINE   Used for:  HTN (hypertension)        Dose:  12.5 mg   Take 0.5 tablets (12.5 mg) by mouth 3 times daily as needed , if systolic blood pressure is more than 140 mmHg.   Quantity:  270 tablet   Refills:  3       levothyroxine 150 MCG tablet   Commonly known as:  SYNTHROID/LEVOTHROID   Used for:  Postablative  hypothyroidism        Take one tablet daily 6 days a week and one and a half tablets one day a week.   Quantity:  96 tablet   Refills:  3       magnesium 250 MG tablet        Dose:  2 tablet   Take 2 tablets by mouth daily At night.   Refills:  0       meclizine 25 MG tablet   Commonly known as:  ANTIVERT   Used for:  Liver replaced by transplant (H)        Dose:  25 mg   Take 1 tablet (25 mg) by mouth as needed   Quantity:  30 tablet   Refills:  11       metoprolol 50 MG 24 hr tablet   Commonly known as:  TOPROL-XL   Used for:  Paroxysmal atrial fibrillation (H)        Dose:  50 mg   Take 1 tablet (50 mg) by mouth daily   Quantity:  90 tablet   Refills:  3       predniSONE 5 MG tablet   Commonly known as:  DELTASONE   Used for:  Thyroid cancer (H), Liver replaced by transplant (H)        Dose:  5 mg   Take 1 tablet (5 mg) by mouth daily   Quantity:  90 tablet   Refills:  3       sennosides 8.6 MG tablet   Commonly known as:  SENOKOT        Dose:  1 tablet   Take 1 tablet by mouth daily   Refills:  0       SUMAtriptan 50 MG tablet   Commonly known as:  IMITREX   Used for:  Thyroid cancer (H)        Dose:  50 mg   Take 1 tablet (50 mg) by mouth at onset of headache for migraine repeat after 2 hours if needed.   Quantity:  30 tablet   Refills:  3       ursodiol 250 MG tablet   Commonly known as:  ACTIGALL   Used for:  Liver replaced by transplant (H)        Dose:  250 mg   Take 1 tablet (250 mg) by mouth 2 times daily   Quantity:  180 tablet   Refills:  3       voriconazole 200 MG tablet   Commonly known as:  VFEND   Used for:  Coccidioidal pneumonitis (H)        Dose:  200 mg   Take 1 tablet (200 mg) by mouth 2 times daily   Quantity:  60 tablet   Refills:  11                Protect others around you: Learn how to safely use, store and throw away your medicines at www.disposemymeds.org.             Medication List: This is a list of all your medications and when to take them. Check marks below indicate your daily  home schedule. Keep this list as a reference.      Medications           Morning Afternoon Evening Bedtime As Needed    acetaminophen 500 MG Caps   Take 500 mg by mouth as needed Take 500-1,000 mg by mouth every 6 hours if needed.                                ARTIFICIAL TEARS OP   Apply 1 drop to eye as needed                                BENEFIBER Powd   2 teaspoons daily                                calcitRIOL 0.5 MCG capsule   Commonly known as:  ROCALTROL   Take 1 capsule (0.5 mcg) by mouth daily                                clotrimazole 1 % cream   Commonly known as:  LOTRIMIN   Apply topically 2 times daily as needed Reported on 4/25/2017                                cycloSPORINE modified 25 MG capsule   Commonly known as:  GENERIC EQUIVALENT   Take 2 capsules (50 mg) by mouth 2 times daily                                ezetimibe 10 MG tablet   Commonly known as:  ZETIA   Take 0.5 tablets (5 mg) by mouth every evening                                folic acid 1 MG tablet   Commonly known as:  FOLVITE   Take 1 tablet (1 mg) by mouth daily                                furosemide 20 MG tablet   Commonly known as:  LASIX   Take 0.5 tablets (10 mg) by mouth daily                                hydrALAZINE 25 MG tablet   Commonly known as:  APRESOLINE   Take 0.5 tablets (12.5 mg) by mouth 3 times daily as needed , if systolic blood pressure is more than 140 mmHg.                                levothyroxine 150 MCG tablet   Commonly known as:  SYNTHROID/LEVOTHROID   Take one tablet daily 6 days a week and one and a half tablets one day a week.                                magnesium 250 MG tablet   Take 2 tablets by mouth daily At night.                                meclizine 25 MG tablet   Commonly known as:  ANTIVERT   Take 1 tablet (25 mg) by mouth as needed                                metoprolol 50 MG 24 hr tablet   Commonly known as:  TOPROL-XL   Take 1 tablet (50 mg) by mouth daily                                 predniSONE 5 MG tablet   Commonly known as:  DELTASONE   Take 1 tablet (5 mg) by mouth daily                                PRESERVISION AREDS 2 Caps   Take 2 capsules by mouth daily                                sennosides 8.6 MG tablet   Commonly known as:  SENOKOT   Take 1 tablet by mouth daily                                SUMAtriptan 50 MG tablet   Commonly known as:  IMITREX   Take 1 tablet (50 mg) by mouth at onset of headache for migraine repeat after 2 hours if needed.                                triamcinolone 0.1 % cream   Commonly known as:  KENALOG   Apply topically 2 times daily Apply to rash                                ursodiol 250 MG tablet   Commonly known as:  ACTIGALL   Take 1 tablet (250 mg) by mouth 2 times daily                                voriconazole 200 MG tablet   Commonly known as:  VFEND   Take 1 tablet (200 mg) by mouth 2 times daily

## 2017-09-29 NOTE — IP AVS SNAPSHOT
Same Day Surgery [address] [address]    Phone:  [phone]                                       After Visit Summary   9/29/2017    Luz Thompson    MRN: [id number]           After Visit Summary Signature Page     I have received my discharge instructions, and my questions have been answered. I have discussed any challenges I see with this plan with the nurse or doctor.    ..........................................................................................................................................  Patient/Patient Representative Signature      ..........................................................................................................................................  Patient Representative Print Name and Relationship to Patient    ..................................................               ................................................  Date                                            Time    ..........................................................................................................................................  Reviewed by Signature/Title    ...................................................              ..............................................  Date                                                            Time

## 2017-09-29 NOTE — ANESTHESIA CARE TRANSFER NOTE
Patient: Luz Thompson    Procedure(s):  Flexible Bronchoscopy, Endobronchial Ultrasound, Transbronchial Needle Aspiration  - Wound Class: II-Clean Contaminated   - Wound Class: II-Clean Contaminated    Diagnosis: Enlarged Lymph Nodes   Diagnosis Additional Information: No value filed.    Anesthesia Type:   General, ETT     Note:  Airway :Face Mask  Patient transferred to:PACU  Comments: Pt to PACU, report given to RN, VSS patient coughing continuously after extubation, productive, cough settled in PACU, ventilation status stable saturation 100%      Vitals: (Last set prior to Anesthesia Care Transfer)    CRNA VITALS  9/29/2017 1038 - 9/29/2017 1115      9/29/2017             Pulse: 72    SpO2: 96 %    Resp Rate (set): 10                Electronically Signed By: LIZ Thorpe CRNA  September 29, 2017  11:15 AM

## 2017-10-02 LAB — INTERPRETATION ECG - MUSE: NORMAL

## 2017-10-03 LAB — COPATH REPORT: NORMAL

## 2017-10-05 ENCOUNTER — TELEPHONE (OUTPATIENT)
Dept: TRANSPLANT | Facility: CLINIC | Age: 67
End: 2017-10-05

## 2017-10-05 DIAGNOSIS — R10.9 ABDOMINAL PAIN: Primary | ICD-10-CM

## 2017-10-05 DIAGNOSIS — E78.5 HYPERLIPIDEMIA LDL GOAL <100: ICD-10-CM

## 2017-10-05 LAB
ALBUMIN SERPL-MCNC: 3.2 G/DL (ref 3.4–5)
ALP SERPL-CCNC: 212 U/L (ref 40–150)
ALT SERPL W P-5'-P-CCNC: 33 U/L (ref 0–50)
ANION GAP SERPL CALCULATED.3IONS-SCNC: 11 MMOL/L (ref 3–14)
AST SERPL W P-5'-P-CCNC: 15 U/L (ref 0–45)
BILIRUB SERPL-MCNC: 0.4 MG/DL (ref 0.2–1.3)
BUN SERPL-MCNC: 33 MG/DL (ref 7–30)
CALCIUM SERPL-MCNC: 9.9 MG/DL (ref 8.5–10.1)
CHLORIDE SERPL-SCNC: 100 MMOL/L (ref 94–109)
CHOLEST SERPL-MCNC: 231 MG/DL
CO2 SERPL-SCNC: 28 MMOL/L (ref 20–32)
CREAT SERPL-MCNC: 1.38 MG/DL (ref 0.52–1.04)
GFR SERPL CREATININE-BSD FRML MDRD: 38 ML/MIN/1.7M2
GLUCOSE SERPL-MCNC: 93 MG/DL (ref 70–99)
HDLC SERPL-MCNC: 50 MG/DL
LDLC SERPL CALC-MCNC: ABNORMAL MG/DL
LDLC SERPL DIRECT ASSAY-MCNC: 135 MG/DL
NONHDLC SERPL-MCNC: 181 MG/DL
POTASSIUM SERPL-SCNC: 4 MMOL/L (ref 3.4–5.3)
PROT SERPL-MCNC: 7.8 G/DL (ref 6.8–8.8)
SODIUM SERPL-SCNC: 139 MMOL/L (ref 133–144)
TRIGL SERPL-MCNC: 419 MG/DL

## 2017-10-05 PROCEDURE — 80053 COMPREHEN METABOLIC PANEL: CPT | Performed by: INTERNAL MEDICINE

## 2017-10-05 PROCEDURE — 80061 LIPID PANEL: CPT | Performed by: INTERNAL MEDICINE

## 2017-10-05 PROCEDURE — 83721 ASSAY OF BLOOD LIPOPROTEIN: CPT | Mod: 59 | Performed by: INTERNAL MEDICINE

## 2017-10-05 PROCEDURE — 36415 COLL VENOUS BLD VENIPUNCTURE: CPT | Performed by: INTERNAL MEDICINE

## 2017-10-05 NOTE — TELEPHONE ENCOUNTER
having right sided abdominal pain that radiates to her back and up into her shoulder. Per Dr hernandez patient to have CT scan. Orders entered.

## 2017-10-06 DIAGNOSIS — N23 KIDNEY PAIN: ICD-10-CM

## 2017-10-06 DIAGNOSIS — R10.9 ABDOMINAL PAIN: Primary | ICD-10-CM

## 2017-10-06 RX ORDER — OXYCODONE AND ACETAMINOPHEN 5; 325 MG/1; MG/1
1 TABLET ORAL EVERY 6 HOURS PRN
Qty: 10 TABLET | Refills: 0 | Status: CANCELLED | OUTPATIENT
Start: 2017-10-06

## 2017-10-06 RX ORDER — OXYCODONE AND ACETAMINOPHEN 5; 325 MG/1; MG/1
1 TABLET ORAL EVERY 6 HOURS PRN
Qty: 10 TABLET | Refills: 0 | Status: SHIPPED | OUTPATIENT
Start: 2017-10-06 | End: 2018-05-15

## 2017-10-06 NOTE — PROGRESS NOTES
Pt requesting pain medication for abd pain that she is having. Tylenol is not covering pain.Per Dr Ramirez patient can have percocet every 6 as needed with 10 tablets.

## 2017-10-12 ENCOUNTER — MYC MEDICAL ADVICE (OUTPATIENT)
Dept: FAMILY MEDICINE | Facility: CLINIC | Age: 67
End: 2017-10-12

## 2017-10-16 DIAGNOSIS — Z94.4 LIVER REPLACED BY TRANSPLANT (H): Primary | ICD-10-CM

## 2017-10-20 RX ORDER — DIPHENHYDRAMINE HCL 25 MG
50 CAPSULE ORAL ONCE
Status: CANCELLED
Start: 2017-10-20 | End: 2017-10-20

## 2017-10-20 RX ORDER — MEPERIDINE HYDROCHLORIDE 25 MG/ML
25 INJECTION INTRAMUSCULAR; INTRAVENOUS; SUBCUTANEOUS
Status: CANCELLED
Start: 2017-10-20

## 2017-10-20 RX ORDER — ACETAMINOPHEN 325 MG/1
650 TABLET ORAL ONCE
Status: CANCELLED
Start: 2017-10-20 | End: 2017-10-20

## 2017-10-23 ENCOUNTER — OFFICE VISIT (OUTPATIENT)
Dept: FAMILY MEDICINE | Facility: CLINIC | Age: 67
End: 2017-10-23
Payer: MEDICARE

## 2017-10-23 VITALS
OXYGEN SATURATION: 98 % | HEART RATE: 79 BPM | DIASTOLIC BLOOD PRESSURE: 78 MMHG | BODY MASS INDEX: 28.88 KG/M2 | TEMPERATURE: 98.6 F | SYSTOLIC BLOOD PRESSURE: 110 MMHG | WEIGHT: 184 LBS | HEIGHT: 67 IN

## 2017-10-23 DIAGNOSIS — Z91.81 AT RISK FOR FALLING: ICD-10-CM

## 2017-10-23 DIAGNOSIS — K63.1 PERFORATION BOWEL (H): ICD-10-CM

## 2017-10-23 DIAGNOSIS — Z94.4 LIVER REPLACED BY TRANSPLANT (H): ICD-10-CM

## 2017-10-23 DIAGNOSIS — M81.0 AGE-RELATED OSTEOPOROSIS WITHOUT CURRENT PATHOLOGICAL FRACTURE: ICD-10-CM

## 2017-10-23 DIAGNOSIS — G43.009 MIGRAINE WITHOUT AURA AND WITHOUT STATUS MIGRAINOSUS, NOT INTRACTABLE: ICD-10-CM

## 2017-10-23 DIAGNOSIS — Z00.01 ENCOUNTER FOR ROUTINE ADULT PHYSICAL EXAM WITH ABNORMAL FINDINGS: Primary | ICD-10-CM

## 2017-10-23 DIAGNOSIS — B38.2 COCCIDIOIDAL PNEUMONITIS (H): ICD-10-CM

## 2017-10-23 DIAGNOSIS — Z78.9 STATIN INTOLERANCE: ICD-10-CM

## 2017-10-23 DIAGNOSIS — B27.00 EBV (EPSTEIN-BARR VIRUS) VIREMIA: ICD-10-CM

## 2017-10-23 DIAGNOSIS — Z23 NEED FOR PROPHYLACTIC VACCINATION AND INOCULATION AGAINST INFLUENZA: ICD-10-CM

## 2017-10-23 DIAGNOSIS — R42 DIZZINESS: ICD-10-CM

## 2017-10-23 PROBLEM — E78.5 HYPERLIPIDEMIA LDL GOAL <100: Status: ACTIVE | Noted: 2017-10-23

## 2017-10-23 LAB
ALBUMIN SERPL-MCNC: 3.2 G/DL (ref 3.4–5)
ALP SERPL-CCNC: 222 U/L (ref 40–150)
ALT SERPL W P-5'-P-CCNC: 47 U/L (ref 0–50)
AST SERPL W P-5'-P-CCNC: 23 U/L (ref 0–45)
BILIRUB DIRECT SERPL-MCNC: <0.1 MG/DL (ref 0–0.2)
BILIRUB SERPL-MCNC: 0.3 MG/DL (ref 0.2–1.3)
CYCLOSPORINE BLD LC/MS/MS-MCNC: NORMAL UG/L (ref 50–400)
PROT SERPL-MCNC: 7.8 G/DL (ref 6.8–8.8)
SIROLIMUS BLD-MCNC: 5.4 UG/L (ref 5–15)
TME LAST DOSE: NORMAL H
TME LAST DOSE: NORMAL H

## 2017-10-23 PROCEDURE — 90662 IIV NO PRSV INCREASED AG IM: CPT | Performed by: FAMILY MEDICINE

## 2017-10-23 PROCEDURE — 80076 HEPATIC FUNCTION PANEL: CPT | Performed by: INTERNAL MEDICINE

## 2017-10-23 PROCEDURE — 80195 ASSAY OF SIROLIMUS: CPT | Performed by: INTERNAL MEDICINE

## 2017-10-23 PROCEDURE — G0008 ADMIN INFLUENZA VIRUS VAC: HCPCS | Performed by: FAMILY MEDICINE

## 2017-10-23 PROCEDURE — 36415 COLL VENOUS BLD VENIPUNCTURE: CPT | Performed by: INTERNAL MEDICINE

## 2017-10-23 PROCEDURE — G0439 PPPS, SUBSEQ VISIT: HCPCS | Performed by: FAMILY MEDICINE

## 2017-10-23 RX ORDER — SUMATRIPTAN 50 MG/1
50 TABLET, FILM COATED ORAL
Qty: 30 TABLET | Refills: 3 | Status: ON HOLD | OUTPATIENT
Start: 2017-10-23 | End: 2019-08-31

## 2017-10-23 RX ORDER — SIROLIMUS 0.5 MG/1
0.5 TABLET, FILM COATED ORAL EVERY OTHER DAY
COMMUNITY
End: 2018-03-26

## 2017-10-23 RX ORDER — MECLIZINE HYDROCHLORIDE 25 MG/1
25 TABLET ORAL PRN
Qty: 30 TABLET | Refills: 11 | Status: ON HOLD | OUTPATIENT
Start: 2017-10-23 | End: 2019-08-31

## 2017-10-23 ASSESSMENT — PAIN SCALES - GENERAL: PAINLEVEL: NO PAIN (0)

## 2017-10-23 NOTE — PROGRESS NOTES
"  SUBJECTIVE:   Luz Thompson is a 67 year old female who presents for Preventive Visit.  Are you in the first 12 months of your Medicare Part B coverage?  No    Answers for HPI/ROS submitted by the patient on 10/20/2017   Annual Exam:  Getting at least 3 servings of Calcium per day:: Yes  Bi-annual eye exam:: Yes  Dental care twice a year:: NO  Sleep apnea or symptoms of sleep apnea:: Daytime drowsiness  Frequency of exercise:: None  Taking medications regularly:: Yes  Medication side effects:: None  Additional concerns today:: YES  PHQ-2 Score: 0      COGNITIVE SCREENING:  Patient refused    Had a bowel perforation a couple of months ago from a colonoscopy and hematoma. Now resolved. Follow up coccidial pneumonia with pulmonary. A enlarged node is being followed with PET scan. Bronchoscopy was normal.     EBV is better after chemotherapy.    Liver transplant stable but had to do medication changes due to healing needed from perforation. Followed by cardiology.       Hyperlipidemia Follow-Up      Rate your low fat/cholesterol diet?: good    Taking statin?  No    Other lipid medications/supplements?:  none          Reviewed and updated as needed this visit by clinical staffAllergies  Meds         Reviewed and updated as needed this visit by Provider        Social History   Substance Use Topics     Smoking status: Former Smoker     Packs/day: 1.00     Years: 18.00     Types: Cigarettes     Quit date: 4/12/1985     Smokeless tobacco: Never Used     Alcohol use 0.0 oz/week     0 Standard drinks or equivalent per week      Comment: rare - \"I toast at weddings\"       The patient does not drink >3 drinks per day nor >7 drinks per week.    Today's PHQ-2 Score:   PHQ-2 ( 1999 [employer]) 10/20/2017 8/11/2016   Q1: Little interest or pleasure in doing things 0 0   Q2: Feeling down, depressed or hopeless 0 0   PHQ-2 Score 0 0   Q1: Little interest or pleasure in doing things Not at all -   Q2: Feeling down, depressed or " hopeless Not at all -   PHQ-2 Score 0 -         Do you feel safe in your environment - Yes    Do you have a Health Care Directive?: Per patient should have on file      Current providers sharing in care for this patient include: Patient Care Team:  Jennifer Barraza MD as PCP - General (Family Practice)  Gentry Ramirez MD as MD (Gastroenterology)  Eden Clinton MD as MD (Pulmonary)  Oneil Jorgensen MD as MD (Ophthalmology)  Elenita Webster MD as MD (Dermatology)  Crystal Montero MD as MD (Infectious Diseases)  Kaila Thompson, RN as Nurse Coordinator (Cardiology)  Graham Gilmore MD as MD (Clinical Cardiac Electrophysiology)  Randell Reyna MD as MD (Cardiology)      Hearing impairment: Yes, hearing aids used    Ability to successfully perform activities of daily living: Yes, no assistance needed     Fall risk:   No falls in the past year. Not feeling dizzy or having problems with walking at this time.       Home safety:  none identified      The following health maintenance items are reviewed in Epic and correct as of today:Health Maintenance   Topic Date Due     FALL RISK ASSESSMENT  08/11/2017     FLEX SIG Q5 YEARS  08/27/2017     INFLUENZA VACCINE (SYSTEM ASSIGNED)  09/01/2017     DEXA Q3 YR  04/30/2018     TSH W/ FREE T4 REFLEX Q1 YEAR  07/13/2018     MAMMO SCREEN Q2 YR (SYSTEM ASSIGNED)  08/08/2018     ADVANCE DIRECTIVE PLANNING Q5 YRS  08/13/2018     MICROALBUMIN Q1 YEAR  08/22/2018     HEMOGLOBIN Q1 YR  09/18/2018     BMP Q1 YR  10/05/2018     LIPID MONITORING Q1 YEAR  10/05/2018     PNEUMO 5YR BOOST DUE AFTER AGE 65 (NO IB MSG) (2) 02/26/2019     TETANUS IMMUNIZATION (SYSTEM ASSIGNED)  02/26/2024     PNEUMOCOCCAL  Completed     HEPATITIS C SCREENING  Completed     Labs reviewed in EPIC  BP Readings from Last 3 Encounters:   10/23/17 110/78   09/29/17 141/66   09/19/17 159/82    Wt Readings from Last 3 Encounters:   10/23/17 184 lb (83.5 kg)   09/29/17 186 lb 4.6 oz  (84.5 kg)   09/19/17 184 lb 9.6 oz (83.7 kg)                  Patient Active Problem List   Diagnosis     Bladder infection, chronic     Osteoporosis     Osteoarthritis of right knee     HDL deficiency     Advanced directives, counseling/discussion     Vitamin D deficiency     S/P tympanoplasty     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     Prophylactic antibiotic     High risk medication use     Statin intolerance     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     Coccidioidal pneumonitis (H)     SNHL (sensorineural hearing loss)     Abnormal liver function tests     Diagnostic skin and sensitization tests     Perforation bowel (H)     Liver replaced by transplant (H)     Past Surgical History:   Procedure Laterality Date     APPENDECTOMY  1961     BIOPSY       CATARACT IOL, RT/LT      RE12/19/2013, LE12/10/2013 - Toric lenses     CHOLECYSTECTOMY  1991     COLONOSCOPY       COLONOSCOPY  3/10/2014    Procedure: COLONOSCOPY;;  Surgeon: Gentry Ramirez MD;  Location: [facility]     ENDOBRONCHIAL ULTRASOUND FLEXIBLE N/A 9/29/2017    Procedure: ENDOBRONCHIAL ULTRASOUND FLEXIBLE;  Flexible Bronchoscopy, Endobronchial Ultrasound, Transbronchial Needle Aspiration ;  Surgeon: Eden Clinton MD;  Location: [facility]     ENDOSCOPIC RETROGRADE CHOLANGIOPANCREATOGRAM  9/19/2013    Procedure: ENDOSCOPIC RETROGRADE CHOLANGIOPANCREATOGRAM;  Endoscopic Retrograde Cholangiopancreatogram with single balloon enteroscopy, ballon sweep of bile duct;  Surgeon: Brett Membreno MD;  Location: [facility] OR     ENT SURGERY      ear drum repair     HC KNEE SCOPE,MED/LAT MENISECTOMY  8/10/12    Right, partial medial menisectomy only     KNEE SURGERY  1966    R knee     PICC INSERTION  9/18/2013    4fr SL PASV PICC, 40cm (1cm external) in the R basilic vein w/ tip in the low SVC     PICC INSERTION  2/21/2014    5 fr DL BioFlo Navilyst PICC, 46 cm (3 cm external) in the L basilic vein w/ tip in the SVC RA junction.     THYROIDECTOMY  3/2010     TRANSPLANT LIVER RECIPIENT LIVING UNRELATED   "       Social History   Substance Use Topics     Smoking status: Former Smoker     Packs/day: 1.00     Years: 18.00     Types: Cigarettes     Quit date: 1985     Smokeless tobacco: Never Used     Alcohol use 0.0 oz/week     0 Standard drinks or equivalent per week      Comment: rare - \"I toast at weddings\"     Family History   Problem Relation Age of Onset     Hypertension Mother      Endometrial Cancer Mother      Hyperlipidemia Mother      Prostate Cancer Father      Macular Degeneration Father      Cancer - colorectal Maternal Grandmother      in her 80's, has surgery and removal of part of kidney,  at age 98     HEART DISEASE Maternal Grandfather       at 98     Glaucoma Maternal Grandfather      CEREBROVASCULAR DISEASE Paternal Grandmother      in her 80's     Hypertension Paternal Grandmother      HEART DISEASE Paternal Grandfather      MI     Alzheimer Disease Paternal Grandfather      Allergies Son      Neurologic Disorder Daughter      Migraines     Breast Cancer Other      Anesthesia Reaction No family hx of      Crohn Disease No family hx of      Ulcerative Colitis No family hx of          Current Outpatient Prescriptions   Medication Sig Dispense Refill     sirolimus (GENERIC EQUIVALENT) 0.5 MG tablet Take 0.5 mg by mouth every other day        meclizine (ANTIVERT) 25 MG tablet Take 1 tablet (25 mg) by mouth as needed 30 tablet 11     SUMAtriptan (IMITREX) 50 MG tablet Take 1 tablet (50 mg) by mouth at onset of headache for migraine repeat after 2 hours if needed. 30 tablet 3     oxyCODONE-acetaminophen (PERCOCET) 5-325 MG per tablet Take 1 tablet by mouth every 6 hours as needed for pain maximum 4 tablet(s) per day 10 tablet 0     ezetimibe (ZETIA) 10 MG tablet Take 0.5 tablets (5 mg) by mouth every evening 30 tablet 0     metoprolol (TOPROL-XL) 50 MG 24 hr tablet Take 1 tablet (50 mg) by mouth daily 90 tablet 3     folic acid (FOLVITE) 1 MG tablet Take 1 tablet (1 mg) by mouth daily " 100 tablet 3     furosemide (LASIX) 20 MG tablet Take 0.5 tablets (10 mg) by mouth daily 46 tablet 3     hydrALAZINE (APRESOLINE) 25 MG tablet Take 0.5 tablets (12.5 mg) by mouth 3 times daily as needed , if systolic blood pressure is more than 140 mmHg. 270 tablet 3     levothyroxine (SYNTHROID/LEVOTHROID) 150 MCG tablet Take one tablet daily 6 days a week and one and a half tablets one day a week. 96 tablet 3     calcitRIOL (ROCALTROL) 0.5 MCG capsule Take 1 capsule (0.5 mcg) by mouth daily 90 capsule 3     voriconazole (VFEND) 200 MG tablet Take 1 tablet (200 mg) by mouth 2 times daily 60 tablet 11     predniSONE (DELTASONE) 5 MG tablet Take 1 tablet (5 mg) by mouth daily 90 tablet 3     clotrimazole (LOTRIMIN) 1 % cream Apply topically 2 times daily as needed Reported on 4/25/2017       ursodiol (ACTIGALL) 250 MG tablet Take 1 tablet (250 mg) by mouth 2 times daily 180 tablet 3     Wheat Dextrin (BENEFIBER) POWD 2 teaspoons daily       Multiple Vitamins-Minerals (PRESERVISION AREDS 2) CAPS Take 2 capsules by mouth daily (Patient taking differently: Take 1 capsule by mouth 2 times daily )       triamcinolone (KENALOG) 0.1 % cream Apply topically 2 times daily Apply to rash (Patient taking differently: Apply topically 2 times daily as needed Apply to rash) 80 g 3     Hypromellose (ARTIFICIAL TEARS OP) Apply 1 drop to eye as needed       acetaminophen 500 MG CAPS Take 500 mg by mouth as needed Take 500-1,000 mg by mouth every 6 hours if needed.       magnesium 250 MG tablet Take 2 tablets by mouth daily At night.        Allergies   Allergen Reactions     Fluconazole Hives and Itching     Azithromycin Itching     Benadryl [Diphenhydramine Hcl]      Insomnia      Cefpodoxime Itching     Cellcept Diarrhea     Ciprofloxacin Other (See Comments)     Insomnia, mood lability     Codeine      Psych disturbance     Diphenhydramine Other (See Comments)     Doxycycline      Lansoprazole Diarrhea     Levaquin [Levofloxacin]  Other (See Comments)     Headache, hyperactivity     Lisinopril Cough     Methotrexate      Sores     Morphine Itching     Morphine Sulfate Itching     Mycophenolate Diarrhea     Pravastatin Muscle Pain (Myalgia)     Simvastatin Muscle Pain (Myalgia)     severe     Cephalexin Rash     Fever and skin burning     Penicillin G Itching and Rash     Tolectin [Nsaids] Rash     Tramadol Rash     Recent Labs   Lab Test  10/23/17   1024  10/05/17   0907  09/29/17   0854  09/26/17   0916  09/18/17   0921   08/22/17   0905  07/13/17   0843  05/10/17   0929   LDL   --   Cannot estimate LDL when triglyceride exceeds 400 mg/dL  135*   --    --    --    --   Cannot estimate LDL when triglyceride exceeds 400 mg/dL  174*   --   Cannot estimate LDL when triglyceride exceeds 400 mg/dL   Above desirable:  100-129 mg/dl   Borderline High:  130-159 mg/dL   High:             160-189 mg/dL   Very high:       >189 mg/dl  CORRECTED ON 05/15 AT 0844: PREVIOUSLY REPORTED AS Cannot estimate LDL when   triglyceride exceeds 400 mg/dL    198*   HDL   --   50   --    --    --    --   49*   --   48*   TRIG   --   419*   --    --    --    --   447*   --   448*   ALT  47  33   --   60*  150*   < >  107*  105*  56*   CR   --   1.38*   --    --   1.26*   < >  1.29*  1.30*  1.53*   GFRESTIMATED   --   38*   --    --   42*   < >  41*  41*  34*   GFRESTBLACK   --   46*   --    --   51*   < >  50*  49*  41*   POTASSIUM   --   4.0  4.0   --   5.0   < >  3.6  3.6  4.2   TSH   --    --    --    --    --    --    --   3.66  4.74*    < > = values in this interval not displayed.              Pneumonia Vaccine:Adults age 65+ who received Pneumovax (PPSV23) at 65 years or older: Should be given PCV13 > 1 year after their most recent PPSV23  Mammogram Screening: Patient over age 50, mutual decision to screen reflected in health maintenance.    ROS:  Constitutional, HEENT, cardiovascular, pulmonary, gi and gu systems are negative, except as otherwise  "noted.      OBJECTIVE:   /78 (BP Location: Left arm, Patient Position: Chair, Cuff Size: Adult Large)  Pulse 79  Temp 98.6  F (37  C) (Oral)  Ht 5' 7.48\" (1.714 m)  Wt 184 lb (83.5 kg)  SpO2 98%  Breastfeeding? No  BMI 28.41 kg/m2 Estimated body mass index is 28.41 kg/(m^2) as calculated from the following:    Height as of this encounter: 5' 7.48\" (1.714 m).    Weight as of this encounter: 184 lb (83.5 kg).  EXAM:   GENERAL APPEARANCE: healthy, alert and no distress  EYES: Eyes grossly normal to inspection, PERRL and conjunctivae and sclerae normal  HENT: ear canals and TM's normal, nose and mouth without ulcers or lesions, oropharynx clear and oral mucous membranes moist  NECK: no adenopathy, no asymmetry, masses, or scars and thyroid normal to palpation  RESP: lungs clear to auscultation - no rales, rhonchi or wheezes  CV: regular rates and rhythm, normal S1 S2, no S3 or S4, no murmur, click or rub, no peripheral edema and peripheral pulses strong  ABDOMEN: soft, nontender, no hepatosplenomegaly, no masses and bowel sounds normal  MS: no musculoskeletal defects are noted and gait is age appropriate without ataxia  SKIN: no suspicious lesions or rashes, scabs on face.   NEURO: Normal strength and tone, sensory exam grossly normal, mentation intact and speech normal  PSYCH: mentation appears normal and affect normal/bright     ASSESSMENT / PLAN:       ICD-10-CM    1. Encounter for routine adult physical exam with abnormal findings Z00.01 Liver transplant stable but had to have medications adjusted after perforation of bowel during colonoscopy.    2. Liver replaced by transplant (H) Z94.4 sirolimus (GENERIC EQUIVALENT) 0.5 MG tablet- per patient report    3. Perforation bowel (H) K63.1 Resolved but can't have any more colonoscopies   4. 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 Age-related osteoporosis without current pathological fracture M81.0 stable   6. 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 Statin intolerance Z78.9 Follow up with cardiology as scheduled   8. Dizziness R42 meclizine (ANTIVERT) 25 MG tablet- needs only on rare occassion   9. At risk for falling Z91.81 No falls   10. Need for prophylactic vaccination and inoculation against influenza Z23 FLU VACCINE, INCREASED ANTIGEN, PRESV FREE, AGE 65+ [10665]     ADMIN INFLUENZA  (For MEDICARE Patients ONLY) []   11. Migraine without aura and without status migrainosus, not intractable G43.009 SUMAtriptan (IMITREX) 50 MG tablet as needed.        End of Life Planning:  Patient currently has an advanced directive: Yes.  Practitioner is supportive of decision.    COUNSELING:  Reviewed preventive health counseling, as reflected in patient instructions       Regular exercise       Healthy diet/nutrition       Dental care       Osteoporosis Prevention/Bone Health        Estimated body mass index is 28.41 kg/(m^2) as calculated from the following:    Height as of this encounter: 5' 7.48\" (1.714 m).    Weight as of this encounter: 184 lb (83.5 kg).     reports that she quit smoking about 32 years ago. Her smoking use included Cigarettes. She has a 18.00 pack-year smoking history. She has never used smokeless tobacco.        Appropriate preventive services were discussed with this patient, including applicable screening as appropriate for cardiovascular disease, diabetes, osteopenia/osteoporosis, and glaucoma.  As appropriate for age/gender, discussed screening for colorectal cancer, prostate cancer, breast cancer, and cervical cancer. Checklist reviewing preventive services available has been given to the patient.    Reviewed patients plan of care and provided an AVS. The Basic Care Plan (routine screening as documented in Health Maintenance) for [place] meets the Care Plan requirement. This Care Plan has been established and reviewed with the " Patient.    Counseling Resources:  ATP IV Guidelines  Pooled Cohorts Equation Calculator  Breast Cancer Risk Calculator  FRAX Risk Assessment  ICSI Preventive Guidelines  Dietary Guidelines for Americans, 2010  USDA's MyPlate  ASA Prophylaxis  Lung CA Screening    Jennifer Barraza MD  [facility]

## 2017-10-23 NOTE — NURSING NOTE
"Chief Complaint   Patient presents with     Physical     Non fasting       Initial /78 (BP Location: Left arm, Patient Position: Chair, Cuff Size: Adult Large)  Pulse 79  Temp 98.6  F (37  C) (Oral)  Ht 5' 7.48\" (1.714 m)  Wt 184 lb (83.5 kg)  SpO2 98%  Breastfeeding? No  BMI 28.41 kg/m2 Estimated body mass index is 28.41 kg/(m^2) as calculated from the following:    Height as of this encounter: 5' 7.48\" (1.714 m).    Weight as of this encounter: 184 lb (83.5 kg).  Medication Reconciliation: complete     Danielito Dumont CMA    "

## 2017-10-23 NOTE — PATIENT INSTRUCTIONS
How to contact your care team: [phone] Pharmacy [phone]   MD DAMIEN AGUILAR PA-C CHRIS JONES, PA-C NAM HO, MD JONATHAN BATES, MD ARVIN VOCAL, MD    Clinic hours M-Th 7am-7pm Fri 7am-5pm.   Urgent care M-F 11am-9pm  Sat/Sun 9am-5pm.   Pharmacy   Mon-Th:  8:00am-8pm   Fri:  8:00am-6:00pm  Sat/Sun  8:00am-5:00 pm       Preventive Health Recommendations  Female Ages 65 +    Yearly exam:     See your health care provider every year in order to  o Review health changes.   o Discuss preventive care.    o Review your medicines if your doctor has prescribed any.      You no longer need a yearly Pap test unless you've had an abnormal Pap test in the past 10 years. If you have vaginal symptoms, such as bleeding or discharge, be sure to talk with your provider about a Pap test.      Every 1 to 2 years, have a mammogram.  If you are over 69, talk with your health care provider about whether or not you want to continue having screening mammograms.      Every 10 years, have a colonoscopy. Or, have a yearly FIT test (stool test). These exams will check for colon cancer.       Have a cholesterol test every 5 years, or more often if your doctor advises it.       Have a diabetes test (fasting glucose) every three years. If you are at risk for diabetes, you should have this test more often.       At age 65, have a bone density scan (DEXA) to check for osteoporosis (brittle bone disease).    Shots:    Get a flu shot each year.    Get a tetanus shot every 10 years.    Talk to your doctor about your pneumonia vaccines. There are now two you should receive - Pneumovax (PPSV 23) and Prevnar (PCV 13).    Talk to your doctor about the shingles vaccine.    Talk to your doctor about the hepatitis B vaccine.    Nutrition:     Eat at least 5 servings of fruits and vegetables each day.      Eat whole-grain bread, whole-wheat pasta and brown rice instead of white grains and rice.      Talk to your provider about  Calcium and Vitamin D.     Lifestyle    Exercise at least 150 minutes a week (30 minutes a day, 5 days a week). This will help you control your weight and prevent disease.      Limit alcohol to one drink per day.      No smoking.       Wear sunscreen to prevent skin cancer.       See your dentist twice a year for an exam and cleaning.      See your eye doctor every 1 to 2 years to screen for conditions such as glaucoma, macular degeneration, cataracts, etc

## 2017-10-23 NOTE — MR AVS SNAPSHOT
After Visit Summary   10/23/2017    Luz Thompson    MRN: [id number]           Patient Information     Date Of Birth          1950        Visit Information        Provider Department      10/23/2017 10:40 AM Jennifer Barraza MD [facility]        Today's Diagnoses     Encounter for routine adult physical exam with abnormal findings    -  1    Liver replaced by transplant (H)        Perforation bowel (H)        Coccidioidal pneumonitis (H)        Age-related osteoporosis without current pathological fracture        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        Statin intolerance        Dizziness        At risk for falling        Need for prophylactic vaccination and inoculation against influenza        Migraine without aura and without status migrainosus, not intractable          Care Instructions    How to contact your care team: [phone] Pharmacy [phone]   MD DAMIEN AGUILAR PA-C CHRIS JONES, PA-C NAM HO, MD JONATHAN BATES, MD ARVIN VOCAL, MD    Clinic hours M-Th 7am-7pm Fri 7am-5pm.   Urgent care M-F 11am-9pm  Sat/Sun 9am-5pm.   Pharmacy   Mon-Th:  8:00am-8pm   Fri:  8:00am-6:00pm  Sat/Sun  8:00am-5:00 pm       Preventive Health Recommendations  Female Ages 65 +    Yearly exam:     See your health care provider every year in order to  o Review health changes.   o Discuss preventive care.    o Review your medicines if your doctor has prescribed any.      You no longer need a yearly Pap test unless you've had an abnormal Pap test in the past 10 years. If you have vaginal symptoms, such as bleeding or discharge, be sure to talk with your provider about a Pap test.      Every 1 to 2 years, have a mammogram.  If you are over 69, talk with your health care provider about whether or not you want to continue having screening mammograms.      Every 10 years, have a colonoscopy. Or, have a yearly FIT test (stool test). These exams will check for colon  cancer.       Have a cholesterol test every 5 years, or more often if your doctor advises it.       Have a diabetes test (fasting glucose) every three years. If you are at risk for diabetes, you should have this test more often.       At age 65, have a bone density scan (DEXA) to check for osteoporosis (brittle bone disease).    Shots:    Get a flu shot each year.    Get a tetanus shot every 10 years.    Talk to your doctor about your pneumonia vaccines. There are now two you should receive - Pneumovax (PPSV 23) and Prevnar (PCV 13).    Talk to your doctor about the shingles vaccine.    Talk to your doctor about the hepatitis B vaccine.    Nutrition:     Eat at least 5 servings of fruits and vegetables each day.      Eat whole-grain bread, whole-wheat pasta and brown rice instead of white grains and rice.      Talk to your provider about Calcium and Vitamin D.     Lifestyle    Exercise at least 150 minutes a week (30 minutes a day, 5 days a week). This will help you control your weight and prevent disease.      Limit alcohol to one drink per day.      No smoking.       Wear sunscreen to prevent skin cancer.       See your dentist twice a year for an exam and cleaning.      See your eye doctor every 1 to 2 years to screen for conditions such as glaucoma, macular degeneration, cataracts, etc           Follow-ups after your visit        Follow-up notes from your care team     Return in about 6 months (around 4/23/2018) for Physical Exam, medication follow up, Lab Work, BP Recheck.      Your next 10 appointments already scheduled     May 15, 2018 11:00 AM CDT   (Arrive by 10:45 AM)   Return General Liver with Gentry Ramirez MD   [facility] ([facility])    [address] [place] [id number] [phone]              Who to contact     If you have questions or need follow up information about today's clinic visit or your schedule please contact [facility]  "CRIST[facility] CODY directly at [phone].  Normal or non-critical lab and imaging results will be communicated to you by MyChart, letter or phone within 4 business days after the clinic has received the results. If you do not hear from us within 7 days, please contact the clinic through [employer] or phone. If you have a critical or abnormal lab result, we will notify you by phone as soon as possible.  Submit refill requests through [employer] or call your pharmacy and they will forward the refill request to us. Please allow 3 business days for your refill to be completed.          Additional Information About Your Visit        [employer] EDI Information     [employer] gives you secure access to your electronic health record. If you see a primary care provider, you can also send messages to your care team and make appointments. If you have questions, please call your primary care clinic.  If you do not have a primary care provider, please call [phone] and they will assist you.        Care EveryWhere ID     This is your Care EveryWhere ID. This could be used by other organizations to access your [place] medical records  [id number]        Your Vitals Were     Pulse Temperature Height Pulse Oximetry Breastfeeding? BMI (Body Mass Index)    79 98.6  F (37  C) (Oral) 5' 7.48\" (1.714 m) 98% No 28.41 kg/m2       Blood Pressure from Last 3 Encounters:   10/23/17 110/78   09/29/17 141/66   09/19/17 159/82    Weight from Last 3 Encounters:   10/23/17 184 lb (83.5 kg)   09/29/17 186 lb 4.6 oz (84.5 kg)   09/19/17 184 lb 9.6 oz (83.7 kg)              We Performed the Following     ADMIN INFLUENZA  (For MEDICARE Patients ONLY) []     FLU VACCINE, INCREASED ANTIGEN, PRESV FREE, AGE 65+ [94034]          Today's Medication Changes          These changes are accurate as of: 10/23/17  3:30 PM.  If you have any questions, ask your nurse or doctor.               These medicines have changed or have updated prescriptions.        " Dose/Directions    PRESERVISION AREDS 2 Caps   This may have changed:    - how much to take  - when to take this   Used for:  Dry eyes, bilateral        Dose:  2 capsule   Take 2 capsules by mouth daily   Refills:  0       triamcinolone 0.1 % cream   Commonly known as:  KENALOG   This may have changed:    - when to take this  - reasons to take this  - additional instructions   Used for:  Contact dermatitis and other eczema, due to unspecified cause        Apply topically 2 times daily Apply to rash   Quantity:  80 g   Refills:  3            Where to get your medicines      These medications were sent to [id number] - [place], [place] - [address] [address] [facility]   [address] [address] , [facility] [address]     Phone:  [phone]     meclizine 25 MG tablet    SUMAtriptan 50 MG tablet                Primary Care Provider Office Phone # Fax #    Jennifer Amparo Barraza [phone]       [address] [place]  [facility] [address]        Equal Access to Services     [facility]: Hadii aad ku hadasho Soomaali, waaxda luqadaha, qaybta kaalmada adeegyada, waxay idiin hayaan дмитрий lu . So [facility] [phone].    ATENCIÓN: Si habla español, tiene a jason disposición servicios gratuitos de asistencia lingüística. [facility] [phone].    We comply with applicable federal civil rights laws and [place] laws. We do not discriminate on the basis of race, color, national origin, age, disability, sex, sexual orientation, or gender identity.            Thank you!     Thank you for choosing [facility]  for your care. Our goal is always to provide you with excellent care. Hearing back from our patients is one way we can continue to improve our services. Please take a few minutes to complete the written survey that you may receive in the mail after your visit with us. Thank you!             Your Updated Medication List - Protect others around you: Learn how to safely use, store and throw away  your medicines at www.disposemymeds.org.          This list is accurate as of: 10/23/17  3:30 PM.  Always use your most recent med list.                   Brand Name Dispense Instructions for use Diagnosis    acetaminophen 500 MG Caps      Take 500 mg by mouth as needed Take 500-1,000 mg by mouth every 6 hours if needed.        ARTIFICIAL TEARS OP      Apply 1 drop to eye as needed        BENEFIBER Powd      2 teaspoons daily        calcitRIOL 0.5 MCG capsule    ROCALTROL    90 capsule    Take 1 capsule (0.5 mcg) by mouth daily        clotrimazole 1 % cream    LOTRIMIN     Apply topically 2 times daily as needed Reported on 4/25/2017        ezetimibe 10 MG tablet    ZETIA    30 tablet    Take 0.5 tablets (5 mg) by mouth every evening        folic acid 1 MG tablet    FOLVITE    100 tablet    Take 1 tablet (1 mg) by mouth daily    Liver replaced by transplant (H)       furosemide 20 MG tablet    LASIX    46 tablet    Take 0.5 tablets (10 mg) by mouth daily    CKD (chronic kidney disease) stage 3, GFR 30-59 ml/min, Liver replaced by transplant (H)       hydrALAZINE 25 MG tablet    APRESOLINE    270 tablet    Take 0.5 tablets (12.5 mg) by mouth 3 times daily as needed , if systolic blood pressure is more than 140 mmHg.    HTN (hypertension)       levothyroxine 150 MCG tablet    SYNTHROID/LEVOTHROID    96 tablet    Take one tablet daily 6 days a week and one and a half tablets one day a week.    Postablative hypothyroidism       magnesium 250 MG tablet      Take 2 tablets by mouth daily At night.        meclizine 25 MG tablet    ANTIVERT    30 tablet    Take 1 tablet (25 mg) by mouth as needed    Dizziness       metoprolol 50 MG 24 hr tablet    TOPROL-XL    90 tablet    Take 1 tablet (50 mg) by mouth daily    Paroxysmal atrial fibrillation (H)       oxyCODONE-acetaminophen 5-325 MG per tablet    PERCOCET    10 tablet    Take 1 tablet by mouth every 6 hours as needed for pain maximum 4 tablet(s) per day    Abdominal pain        predniSONE 5 MG tablet    DELTASONE    90 tablet    Take 1 tablet (5 mg) by mouth daily    Thyroid cancer (H), Liver replaced by transplant (H)       PRESERVISION AREDS 2 Caps      Take 2 capsules by mouth daily    Dry eyes, bilateral       sirolimus 0.5 MG tablet    GENERIC EQUIVALENT     Take 0.5 mg by mouth every other day    Liver replaced by transplant (H)       SUMAtriptan 50 MG tablet    IMITREX    30 tablet    Take 1 tablet (50 mg) by mouth at onset of headache for migraine repeat after 2 hours if needed.    Migraine without aura and without status migrainosus, not intractable       triamcinolone 0.1 % cream    KENALOG    80 g    Apply topically 2 times daily Apply to rash    Contact dermatitis and other eczema, due to unspecified cause       ursodiol 250 MG tablet    ACTIGALL    180 tablet    Take 1 tablet (250 mg) by mouth 2 times daily    Liver replaced by transplant (H)       voriconazole 200 MG tablet    VFEND    60 tablet    Take 1 tablet (200 mg) by mouth 2 times daily    Coccidioidal pneumonitis (H)

## 2017-10-23 NOTE — PROGRESS NOTES
Discussed results with patient. She continues to take Zetia 10 mg daily.  She did not tolerate Pravastatin 5 mg every other day and is unwilling to re-try therapy.  She would like a return call with recommendations.

## 2017-10-23 NOTE — NURSING NOTE

## 2017-11-27 DIAGNOSIS — B38.2 COCCIDIOIDAL PNEUMONITIS (H): ICD-10-CM

## 2017-11-27 RX ORDER — VORICONAZOLE 200 MG/1
200 TABLET, FILM COATED ORAL 2 TIMES DAILY
Qty: 180 TABLET | Refills: 1 | Status: SHIPPED | OUTPATIENT
Start: 2017-11-27 | End: 2017-12-29

## 2017-11-27 NOTE — TELEPHONE ENCOUNTER
Per Dr Montero via face to face conversation in clinic, OK to put in order for voriconazole for pt's EBV infection.  Eri Jenkins RN

## 2017-12-07 ENCOUNTER — TELEPHONE (OUTPATIENT)
Dept: INFECTIOUS DISEASES | Facility: CLINIC | Age: 67
End: 2017-12-07

## 2017-12-07 NOTE — TELEPHONE ENCOUNTER
"Pt called to report possible sinus infection and to request Rx for prednisone.     Pt states she \"started coming down with sinus infection appx 12/10. [she] has been using saline spray, which seems to help a little. [She] has some drainage in the back of her throat. [her] temp peaked at 99.6 on 12/05. [She] woke up at 3:20am this morning with a sinus headache.     Pt will be leaving out of town at 3pm today & request Rx to be sent to [facility].    Please callback pt at [phone].    Route to Infectious Disease pool.   "

## 2017-12-14 NOTE — TELEPHONE ENCOUNTER
Per Dr. Montero, she spoke with the pt already. Writer BONIM with pt to follow up and see how she is feeling.  Gayatri Santiago RN

## 2017-12-15 NOTE — TELEPHONE ENCOUNTER
"Pt called back today. She says she is \"feeling quite a bit better.\" The only remaining symptom she has is a \"crackly right ear,\" which she attributes to flying. No further concerns at this time.  Gayatri Santiago RN    "

## 2017-12-29 DIAGNOSIS — B38.2 COCCIDIOIDAL PNEUMONITIS (H): ICD-10-CM

## 2017-12-29 RX ORDER — VORICONAZOLE 200 MG/1
200 TABLET, FILM COATED ORAL 2 TIMES DAILY
Qty: 60 TABLET | Refills: 5 | Status: SHIPPED | OUTPATIENT
Start: 2017-12-29 | End: 2018-05-17

## 2017-12-29 NOTE — TELEPHONE ENCOUNTER
Per [employer], they will not cover 90 day supply of voriconazole but have approved coverage of 30 day supply. Changing script to 30 day supply so pt is able to get the medication.  Gayatri Santiago RN

## 2018-01-02 ENCOUNTER — TELEPHONE (OUTPATIENT)
Dept: INFECTIOUS DISEASES | Facility: CLINIC | Age: 68
End: 2018-01-02

## 2018-01-02 NOTE — TELEPHONE ENCOUNTER
Central Prior Authorization Team   Phone: [phone]    Received Approval from [employer] via fax:

## 2018-01-26 DIAGNOSIS — Z94.4 LIVER REPLACED BY TRANSPLANT (H): ICD-10-CM

## 2018-01-26 RX ORDER — URSODIOL 250 MG/1
250 TABLET, FILM COATED ORAL
Qty: 180 TABLET | Refills: 3 | Status: SHIPPED | OUTPATIENT
Start: 2018-01-26 | End: 2019-03-05

## 2018-02-08 ENCOUNTER — TRANSFERRED RECORDS (OUTPATIENT)
Dept: HEALTH INFORMATION MANAGEMENT | Facility: CLINIC | Age: 68
End: 2018-02-08

## 2018-03-20 ENCOUNTER — HOSPITAL ENCOUNTER (EMERGENCY)
Facility: CLINIC | Age: 68
Discharge: HOME | End: 2018-03-20
Attending: EMERGENCY MEDICINE | Admitting: EMERGENCY MEDICINE
Payer: MEDICARE

## 2018-03-20 VITALS
DIASTOLIC BLOOD PRESSURE: 68 MMHG | SYSTOLIC BLOOD PRESSURE: 152 MMHG | TEMPERATURE: 98.9 F | HEART RATE: 81 BPM | OXYGEN SATURATION: 100 % | RESPIRATION RATE: 18 BRPM

## 2018-03-20 DIAGNOSIS — F32.A DEPRESSION, UNSPECIFIED DEPRESSION TYPE: ICD-10-CM

## 2018-03-20 LAB
AMPHETAMINES UR QL SCN: NEGATIVE
BARBITURATES UR QL: NEGATIVE
BENZODIAZ UR QL: NEGATIVE
CANNABINOIDS UR QL SCN: NEGATIVE
COCAINE UR QL: NEGATIVE
OPIATES UR QL SCN: NEGATIVE
PCP UR QL SCN: NEGATIVE

## 2018-03-20 PROCEDURE — 90791 PSYCH DIAGNOSTIC EVALUATION: CPT

## 2018-03-20 PROCEDURE — 99285 EMERGENCY DEPT VISIT HI MDM: CPT | Mod: 25

## 2018-03-20 PROCEDURE — 80307 DRUG TEST PRSMV CHEM ANLYZR: CPT | Performed by: EMERGENCY MEDICINE

## 2018-03-20 RX ORDER — VENLAFAXINE HYDROCHLORIDE 37.5 MG/1
37.5 CAPSULE, EXTENDED RELEASE ORAL DAILY
Qty: 30 CAPSULE | Refills: 0 | Status: SHIPPED | OUTPATIENT
Start: 2018-03-20 | End: 2018-03-20

## 2018-03-20 RX ORDER — BUPROPION HYDROCHLORIDE 100 MG/1
100 TABLET, EXTENDED RELEASE ORAL ONCE
Qty: 1 TABLET | Refills: 0 | Status: SHIPPED | OUTPATIENT
Start: 2018-03-20 | End: 2018-05-04 | Stop reason: SINTOL

## 2018-03-20 ASSESSMENT — ENCOUNTER SYMPTOMS: ACTIVITY CHANGE: 1

## 2018-03-20 NOTE — ED AVS SNAPSHOT
[facility]    [address]    [facility] [address]    Phone:  [phone]    Fax:  [phone]                                       Luz Thompson   MRN: [id number]    Department:  [facility]   Date of Visit:  3/20/2018           After Visit Summary Signature Page     I have received my discharge instructions, and my questions have been answered. I have discussed any challenges I see with this plan with the nurse or doctor.    ..........................................................................................................................................  Patient/Patient Representative Signature      ..........................................................................................................................................  Patient Representative Print Name and Relationship to Patient    ..................................................               ................................................  Date                                            Time    ..........................................................................................................................................  Reviewed by Signature/Title    ...................................................              ..............................................  Date                                                            Time

## 2018-03-20 NOTE — ED NOTES
"Patient presents with complaints of \"hurting herself\". She states that two weeks ago her  asked for a divorce. She states that she doesn't want to live without him. Family states things have gotten  over the past two days. ABC intact without need for intervention.    "

## 2018-03-20 NOTE — ED NOTES
"Pt states \" I dont want to hurt myself. I want god to take me and die. There is nothing left to live for. \"  "

## 2018-03-20 NOTE — DISCHARGE INSTRUCTIONS
Depression  Depression is one of the most common mental health problems today. It is not just a state of unhappiness or sadness. It is a true disease. The cause seems to be related to a decrease in chemicals that transmit signals in the brain. Having a family history of depression, alcoholism, or suicide increases the risk. Chronic illness, chronic pain, migraine headaches and high emotional stress also increase the risk.  Depression is something we tend to recognize in others, but may have a hard time seeing in ourselves. It can show in many physical and emotional ways:    Loss of appetite    Over-eating    Not being able to sleep    Sleeping too much    Tiredness not related to physical exertion    Restlessness or irritability    Slowness of movement or speech    Feeling depressed or withdrawn    Loss of interest in things you once enjoyed    Trouble concentrating, poor memory, trouble making decisions    Thoughts of harming or killing oneself, or thoughts that life is not worth living    Low self-esteem  The treatment for depression may include both medicine and psychotherapy. Antidepressants can reduce suffering and can improve the ability to function during the depressed period. Therapy can offer emotional support and help you understand emotional factors that may be causing the depression.  Home care    On-going care and support helps people manage this disease.  Find a healthcare provider and therapist who meet your needs. Seek help when you feel like you may be getting ill.    Be kind to yourself. Make it a point to do things that you enjoy (gardening, walking in nature, going to a movie, etc.). Reward yourself for small successes.    Take care of your physical body. Eat a balanced diet (low in saturated fat and high in fruits and vegetables). Exercise at least 3 times a week for 30 minutes. Even mild-moderate exercise (like brisk walking) can make you feel better.    Avoid alcohol, which can make  depression worse.    Take medicine as prescribed.    Tell each of your healthcare providers about all of the prescription drugs, over-the-counter medicines, vitamins, and supplements you take. Certain supplements interact with medicines and can result in dangerous side effects. Ask your pharmacist when you have questions about drug interactions.    Talk with your family and trusted friends about your feelings and thoughts. Ask them to help you recognize behavior changes early so you can get help and, if needed, medicine can be adjusted.  Follow-up care  Follow up with your healthcare provider, or as advised.  Call 911  Call 911 if you:    Have suicidal thoughts, a suicide plan, and the means to carry out the plan    Have trouble breathing    Are very confused    Feel very drowsy or have trouble awakening    Faint or lose consciousness    Have new chest pain that becomes more severe, lasts longer, or spreads into your shoulder, arm, neck, jaw or back  When to seek medical advice  Call your healthcare provider right away if any of these occur:    Feeling extreme depression, fear, anxiety, or anger toward yourself or others    Feeling out of control    Feeling that you may try to harm yourself or another    Hearing voices that others do not hear    Seeing things that others do not see    Can t sleep or eat for 3 days in a row    Friends or family express concern over your behavior and ask you to seek help  Date Last Reviewed: 9/29/2015 2000-2017 [employer]. [address] [place] [address], [place] [address]. All rights reserved. This information is not intended as a substitute for professional medical care. Always follow your healthcare professional's instructions.

## 2018-03-20 NOTE — ED PROVIDER NOTES
"  History     Chief Complaint:  Suicidal      The history is provided by the patient and a relative.      Luz Thompson is a 67 year old female with a complex medical history including liver transplant (2002) and atrial fibrillation currently anti-coagulated on Eliquis who presents to the ED for evaluation of suicidal. Over the last few years, the patient and her  sold their house and downsized to an . They have since traveled back and forth from [place] to [place], per patient's children. Two weeks ago, the patient's  asked for a divorce per the patient while they were both in [place]. Her friend drove down and brought her to [place] on 3/18. She now stays in her daughter's guest house. Over the past two days, she reports having an increasingly difficult time doing daily activities. She says, \"I can't live like this. I can't get out of bed. I don't want to eat or do anything.\"  When asked if she had a plan, she states, \"I did not say I wanted to kill myself. I said I wanted to die.\" When asked if she wanted resources and referrals, she reports already having connections. She has set up a marriage counseling appointment with her , a friend has referred her to a therapist, and her daughter has referred her to a Jainism therapy place in [place]. For clarification, she said, \"I have resources available to me, but right now, I just cannot make myself do anything. I want help so I don't have to spend all day crying.\" She denies alcohol use.     Her children report that the news of the divorce came \"out of the blue\". They believe that she has greatly decompensated and likely will need medication go give her hope. Additionally, they are trying to give her opportunities to \"give back and give her a purpose.\"    She does have a history of liver transplant in 2002, due to primary biliary cirrhosis, thyroid cancer in 2010, for which she had a thyroidectomy, and [place] fever recently, for which she " "is on voriconazole. Additionally, she reports \"spots on her lung\", which have grown from 7-12 mm. She was on anti-depressants prior to her liver transplant.        Allergies:  Fluconazole  Azithromycin  Benadryl [Diphenhydramine Hcl]  Cefpodoxime  Cellcept  Ciprofloxacin  Codeine  Diphenhydramine  Doxycycline  Lansoprazole  Levaquin [Levofloxacin]  Lisinopril  Methotrexate  Morphine  Morphine Sulfate  Mycophenolate  Pravastatin  Simvastatin  Cephalexin  Penicillin G  Tolectin [Nsaids]  Tramadol    Medications:    Ursodiol 250 mg twice daily  Voriconazole  Sirolimus  Metoprolol  Hydralazine  Prednisone 5 mg daily  Rapamune  Elliquis    Past Medical History:    Anemia of other chronic disease   Anxiety   Bladder infection, chronic   CKD (chronic kidney disease) stage 3, GFR 30-59 ml/min   Coccidioidomycosis   Diverticulosis of sigmoid colon   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   H/O esophageal varices   Hearing loss   Heart murmur   History of DVT (deep vein thrombosis)   History of 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  Former smoker: 1.00 ppd for 18 years, quit in 1985  Negative for smokeless tobacco.   Positive for alcohol use.   Marital Status:   [2]     Review of Systems   Constitutional: Positive for activity change.   Psychiatric/Behavioral: Positive for suicidal ideas. Negative for self-injury.   All other systems reviewed and are negative.    Physical Exam   First Vitals:  BP: 117/74  Pulse: 86  Heart Rate: 86  Temp: 98.9  F (37.2  C)  Resp: 18  SpO2: 97 %      Physical Exam  Gen: Pleasant, appears stated age.    Eye:   Pupils are equal, round, and reactive.     Sclera non-injected.    ENT:   Moist mucus membranes.     Normal tongue.    Oropharynx without lesions.    Cardiac:     Normal rate and regular rhythm.    No gallops or rubs.   1/6 systolic ejection murmur    Pulmonary:     Clear to auscultation bilaterally.    No wheezes, rales, or rhonchi.    Abdomen:     Normal active bowel sounds.     Abdomen is soft and non-distended, without focal tenderness.    Musculoskeletal:     Normal movement of all extremities without evidence for deficit.    Extremities:    No edema.    Skin:   Warm and dry.    Neurologic:    Non-focal exam without asymmetric weakness or numbness.    Normal tone    Psychiatric:     Normal affect with appropriate interaction with examiner.  Intermittently tearful, but able to quiet herself.   Clear and fluent speech, not tangential.  Denies SI.  No AH/VH.    Emergency Department Course   Laboratory:  Drug abuse: Negative    Emergency Department Course:  Nursing notes and vitals reviewed. 1245 I performed an exam of the patient as documented above.     Blood drawn. This was sent to the lab for further testing, results above.    1315 I discussed the case with Dr. Wood of psychiatry. She has recommended DEC evaluation and outpatient management.     1326 I rechecked the patient and discussed the results of her workup thus far. She  and her children are okay with the plan.     1420  I consulted with DEC.  Agrees with outpatient management.    3:10 PM  Patient has follow up therapist 11am today.  Awaiting appointment with psych.      Findings and plan explained to the Patient and daughter. Patient discharged home with instructions regarding supportive care, medications, and reasons to return. The importance of close follow-up was reviewed. The patient was prescribed Wellbutrin      Impression & Plan    Medical Decision Making:  Luz Thompson is a 67 year old female with complicated medical history including liver transplant and [place] Fever, presenting with depression.  The patient is having an acute grief reaction following the revelation that her  wants a divorce.  She does not have any thoughts of self-harm, but does not want to live anymore with the pain of this separation.  She is motivated to get better, and is surrounded by supportive daughter and son.  I consulted Dr. Wood, who recommended Lexapro, effexor, or wellbutrin for her symptoms.  Wellburtrin appears to have the best safety profile and fewest interactions given the patient is on eliquis, voriconazole, rapamune, and ursodiol.  She was evaluated by DEC.  She has an appointment with a therapist tomorrow, and has psychiatrist appointment in the next 2 weeks.  She is in agreement with this plan, and appears improved after having a plan in place.  She will return if she does not feel safe at home.    Diagnosis:    ICD-10-CM    1. Depression, unspecified depression type F32.9        Disposition:  discharged to home with daughter    Discharge Medications:  Discharge Medication List as of 3/20/2018  3:47 PM      START taking these medications    Details   buPROPion (WELLBUTRIN SR) 100 MG 12 hr tablet Take 1 tablet (100 mg) by mouth once for 1 dose In the morning, Disp-1 tablet, R-0, Local Print           I, Millicent Caceres, am serving as a scribe on 3/20/2018 at 12:45 PM to  personally document services performed by Surekha Kendall MD based on my observations and the provider's statements to me.       Millicent Caceres  3/20/2018   [facility] EMERGENCY DEPARTMENT       Surekha Kendall MD  03/20/18 [id number]

## 2018-03-20 NOTE — ED AVS SNAPSHOT
[facility] Emergency Department    [address]    [facility] [address]    Phone:  [phone]    Fax:  [phone]                                       Luz Thompson   MRN: [id number]    Department:  [facility] Emergency Department   Date of Visit:  3/20/2018           Patient Information     Date Of Birth          1950        Your diagnoses for this visit were:     Depression, unspecified depression type        You were seen by Surekha Kendall MD.      Follow-up Information     Follow up with Your therapist and psychiatrist.    Why:  as scheduled        Follow up with [facility] Emergency Department.    Specialty:  EMERGENCY MEDICINE    Why:  immediately , If symptoms worsen    Contact information:    [address]  [facility] [address] [phone]        Discharge Instructions         Depression  Depression is one of the most common mental health problems today. It is not just a state of unhappiness or sadness. It is a true disease. The cause seems to be related to a decrease in chemicals that transmit signals in the brain. Having a family history of depression, alcoholism, or suicide increases the risk. Chronic illness, chronic pain, migraine headaches and high emotional stress also increase the risk.  Depression is something we tend to recognize in others, but may have a hard time seeing in ourselves. It can show in many physical and emotional ways:    Loss of appetite    Over-eating    Not being able to sleep    Sleeping too much    Tiredness not related to physical exertion    Restlessness or irritability    Slowness of movement or speech    Feeling depressed or withdrawn    Loss of interest in things you once enjoyed    Trouble concentrating, poor memory, trouble making decisions    Thoughts of harming or killing oneself, or thoughts that life is not worth living    Low self-esteem  The treatment for depression may  include both medicine and psychotherapy. Antidepressants can reduce suffering and can improve the ability to function during the depressed period. Therapy can offer emotional support and help you understand emotional factors that may be causing the depression.  Home care    On-going care and support helps people manage this disease.  Find a healthcare provider and therapist who meet your needs. Seek help when you feel like you may be getting ill.    Be kind to yourself. Make it a point to do things that you enjoy (gardening, walking in nature, going to a movie, etc.). Reward yourself for small successes.    Take care of your physical body. Eat a balanced diet (low in saturated fat and high in fruits and vegetables). Exercise at least 3 times a week for 30 minutes. Even mild-moderate exercise (like brisk walking) can make you feel better.    Avoid alcohol, which can make depression worse.    Take medicine as prescribed.    Tell each of your healthcare providers about all of the prescription drugs, over-the-counter medicines, vitamins, and supplements you take. Certain supplements interact with medicines and can result in dangerous side effects. Ask your pharmacist when you have questions about drug interactions.    Talk with your family and trusted friends about your feelings and thoughts. Ask them to help you recognize behavior changes early so you can get help and, if needed, medicine can be adjusted.  Follow-up care  Follow up with your healthcare provider, or as advised.  Call 911  Call 911 if you:    Have suicidal thoughts, a suicide plan, and the means to carry out the plan    Have trouble breathing    Are very confused    Feel very drowsy or have trouble awakening    Faint or lose consciousness    Have new chest pain that becomes more severe, lasts longer, or spreads into your shoulder, arm, neck, jaw or back  When to seek medical advice  Call your healthcare provider right away if any of these  occur:    Feeling extreme depression, fear, anxiety, or anger toward yourself or others    Feeling out of control    Feeling that you may try to harm yourself or another    Hearing voices that others do not hear    Seeing things that others do not see    Can t sleep or eat for 3 days in a row    Friends or family express concern over your behavior and ask you to seek help  Date Last Reviewed: 9/29/2015 2000-2017 [employer]. [address] [place] [address]. All rights reserved. This information is not intended as a substitute for professional medical care. Always follow your healthcare professional's instructions.          Future Appointments        Provider Department Dept Phone Center    3/27/2018 3:00 PM [facility] ROOM 1 [facility] Imaging [place] [phone] [facility]    3/27/2018 3:30 PM Eden Clinton MD [facility] [phone] [facility]    5/15/2018 11:00 AM Gentry Ramirez MD [facility] Hepatology [phone] [facility]    5/22/2018 1:15 PM Mavis Bermudez MD [facility] Dermatology [phone] [facility]    9/12/2018 2:00 PM Randell Reyna MD [facility] Heart Care [phone] [facility]      24 Hour Appointment Hotline       To make an appointment at any [facility], call [phone] ([phone]). If you don't have a family doctor or clinic, we will help you find one. [facility] are conveniently located to serve the needs of you and your family.             Review of your medicines      START taking        Dose / Directions Last dose taken    buPROPion 100 MG 12 hr tablet   Commonly known as:  WELLBUTRIN SR   Dose:  100 mg   Quantity:  1 tablet        Take 1 tablet (100 mg) by mouth once for 1 dose In the morning   Refills:  0          Our records show that you are taking the medicines listed below. If these are incorrect, please call your family doctor or clinic.        Dose / Directions Last dose taken     acetaminophen 500 MG Caps   Dose:  500 mg        Take 500 mg by mouth as needed Take 500-1,000 mg by mouth every 6 hours if needed.   Refills:  0        ARTIFICIAL TEARS OP   Dose:  1 drop        Apply 1 drop to eye as needed   Refills:  0        BENEFIBER Powd        2 teaspoons daily   Refills:  0        calcitRIOL 0.5 MCG capsule   Commonly known as:  ROCALTROL   Dose:  0.5 mcg   Quantity:  90 capsule        Take 1 capsule (0.5 mcg) by mouth daily   Refills:  3        clotrimazole 1 % cream   Commonly known as:  LOTRIMIN        Apply topically 2 times daily as needed Reported on 4/25/2017   Refills:  0        ezetimibe 10 MG tablet   Commonly known as:  ZETIA   Dose:  5 mg   Quantity:  30 tablet        Take 0.5 tablets (5 mg) by mouth every evening   Refills:  0        folic acid 1 MG tablet   Commonly known as:  FOLVITE   Dose:  1 mg   Quantity:  100 tablet        Take 1 tablet (1 mg) by mouth daily   Refills:  3        furosemide 20 MG tablet   Commonly known as:  LASIX   Dose:  10 mg   Quantity:  46 tablet        Take 0.5 tablets (10 mg) by mouth daily   Refills:  3        hydrALAZINE 25 MG tablet   Commonly known as:  APRESOLINE   Dose:  12.5 mg   Quantity:  270 tablet        Take 0.5 tablets (12.5 mg) by mouth 3 times daily as needed , if systolic blood pressure is more than 140 mmHg.   Refills:  3        levothyroxine 150 MCG tablet   Commonly known as:  SYNTHROID/LEVOTHROID   Quantity:  96 tablet        Take one tablet daily 6 days a week and one and a half tablets one day a week.   Refills:  3        magnesium 250 MG tablet   Dose:  2 tablet        Take 2 tablets by mouth daily At night.   Refills:  0        meclizine 25 MG tablet   Commonly known as:  ANTIVERT   Dose:  25 mg   Quantity:  30 tablet        Take 1 tablet (25 mg) by mouth as needed   Refills:  11        metoprolol succinate 50 MG 24 hr tablet   Commonly known as:  TOPROL-XL   Dose:  50 mg   Quantity:  90 tablet        Take 1 tablet (50 mg)  by mouth daily   Refills:  3        oxyCODONE-acetaminophen 5-325 MG per tablet   Commonly known as:  PERCOCET   Dose:  1 tablet   Quantity:  10 tablet        Take 1 tablet by mouth every 6 hours as needed for pain maximum 4 tablet(s) per day   Refills:  0        predniSONE 5 MG tablet   Commonly known as:  DELTASONE   Dose:  5 mg   Quantity:  90 tablet        Take 1 tablet (5 mg) by mouth daily   Refills:  3        PRESERVISION AREDS 2 Caps   Dose:  2 capsule        Take 2 capsules by mouth daily   Refills:  0        sirolimus 0.5 MG tablet   Commonly known as:  GENERIC EQUIVALENT   Dose:  0.5 mg        Take 0.5 mg by mouth every other day   Refills:  0        SUMAtriptan 50 MG tablet   Commonly known as:  IMITREX   Dose:  50 mg   Quantity:  30 tablet        Take 1 tablet (50 mg) by mouth at onset of headache for migraine repeat after 2 hours if needed.   Refills:  3        triamcinolone 0.1 % cream   Commonly known as:  KENALOG   Quantity:  80 g        Apply topically 2 times daily Apply to rash   Refills:  3        ursodiol 250 MG tablet   Commonly known as:  ACTIGALL   Dose:  250 mg   Quantity:  180 tablet        Take 1 tablet (250 mg) by mouth 2 times daily   Refills:  3        voriconazole 200 MG tablet   Commonly known as:  VFEND   Dose:  200 mg   Quantity:  60 tablet        Take 1 tablet (200 mg) by mouth 2 times daily   Refills:  5                Prescriptions were sent or printed at these locations (1 Prescription)                   Other Prescriptions                Printed at Department/Unit printer (1 of 1)         buPROPion (WELLBUTRIN SR) 100 MG 12 hr tablet                Procedures and tests performed during your visit     Drug abuse screen 77 urine      Orders Needing Specimen Collection     None      Pending Results     No orders found from 3/18/2018 to 3/21/2018.            Pending Culture Results     No orders found from 3/18/2018 to 3/21/2018.            Pending Results Instructions     If you  had any lab results that were not finalized at the time of your Discharge, you can call the ED Lab Result RN at [phone]. You will be contacted by this team for any positive Lab results or changes in treatment. The nurses are available 7 days a week from 10A to 6:30P.  You can leave a message 24 hours per day and they will return your call.        Test Results From Your Hospital Stay        3/20/2018  1:20 PM      Component Results     Component Value Ref Range & Units Status    Amphetamine Qual Urine Negative NEG^Negative Final    Cutoff for a negative amphetamine is 500 ng/mL or less.    Barbiturates Qual Urine Negative NEG^Negative Final    Cutoff for a negative barbiturate is 200 ng/mL or less.    Benzodiazepine Qual Urine Negative NEG^Negative Final    Cutoff for a negative benzodiazepine is 200 ng/mL or less.    Cannabinoids Qual Urine Negative NEG^Negative Final    Cutoff for a negative cannabinoid is 50 ng/mL or less.    Cocaine Qual Urine Negative NEG^Negative Final    Cutoff for a negative cocaine is 300 ng/mL or less.    Opiates Qualitative Urine Negative NEG^Negative Final    Cutoff for a negative opiate is 300 ng/mL or less.    PCP Qual Urine Negative NEG^Negative Final    Cutoff for a negative PCP is 25 ng/mL or less.                Clinical Quality Measure: Blood Pressure Screening     Your blood pressure was checked while you were in the emergency department today. The last reading we obtained was  BP: 117/74 . Please read the guidelines below about what these numbers mean and what you should do about them.  If your systolic blood pressure (the top number) is less than 120 and your diastolic blood pressure (the bottom number) is less than 80, then your blood pressure is normal. There is nothing more that you need to do about it.  If your systolic blood pressure (the top number) is 120-139 or your diastolic blood pressure (the bottom number) is 80-89, your blood pressure may be higher than it  should be. You should have your blood pressure rechecked within a year by a primary care provider.  If your systolic blood pressure (the top number) is 140 or greater or your diastolic blood pressure (the bottom number) is 90 or greater, you may have high blood pressure. High blood pressure is treatable, but if left untreated over time it can put you at risk for heart attack, stroke, or kidney failure. You should have your blood pressure rechecked by a primary care provider within the next 4 weeks.  If your provider in the emergency department today gave you specific instructions to follow-up with your doctor or provider even sooner than that, you should follow that instruction and not wait for up to 4 weeks for your follow-up visit.        Thank you for choosing [facility]       Thank you for choosing [facility] for your care. Our goal is always to provide you with excellent care. Hearing back from our patients is one way we can continue to improve our services. Please take a few minutes to complete the written survey that you may receive in the mail after you visit with us. Thank you!        [employer] Information     [employer] gives you secure access to your electronic health record. If you see a primary care provider, you can also send messages to your care team and make appointments. If you have questions, please call your primary care clinic.  If you do not have a primary care provider, please call [phone] and they will assist you.        Care EveryWhere ID     This is your Care EveryWhere ID. This could be used by other organizations to access your [facility] medical records  [id number]        Equal Access to Services     GENE NOWAK : Hadii brayan Ma, waaxda luqadaha, qaybta kaalmada chata, etta lu . So [facility] [phone].    ATENCIÓN: Si habla español, tiene a jason disposición servicios gratuitos de asistencia lingüística. Llame al [phone].    We comply with  applicable federal civil rights laws and [place] laws. We do not discriminate on the basis of race, color, national origin, age, disability, sex, sexual orientation, or gender identity.            After Visit Summary       This is your record. Keep this with you and show to your community pharmacist(s) and doctor(s) at your next visit.

## 2018-03-24 ASSESSMENT — ENCOUNTER SYMPTOMS
ARTHRALGIAS: 0
DIFFICULTY URINATING: 0
PANIC: 0
COUGH DISTURBING SLEEP: 0
DEPRESSION: 1
HALLUCINATIONS: 0
FLANK PAIN: 0
INSOMNIA: 1
DECREASED CONCENTRATION: 1
NECK PAIN: 1
SKIN CHANGES: 0
NAIL CHANGES: 1
MUSCLE CRAMPS: 0
WEIGHT LOSS: 1
DYSPNEA ON EXERTION: 1
INCREASED ENERGY: 1
NIGHT SWEATS: 0
HEMATURIA: 0
POLYDIPSIA: 0
SPUTUM PRODUCTION: 0
HEMOPTYSIS: 0
STIFFNESS: 0
FEVER: 0
FATIGUE: 1
BACK PAIN: 1
JOINT SWELLING: 0
SNORES LOUDLY: 1
MUSCLE WEAKNESS: 1
SHORTNESS OF BREATH: 1
MYALGIAS: 1
NERVOUS/ANXIOUS: 0
COUGH: 1
POOR WOUND HEALING: 0
CHILLS: 0
WEIGHT GAIN: 0
POLYPHAGIA: 0
POSTURAL DYSPNEA: 0
ALTERED TEMPERATURE REGULATION: 0
DYSURIA: 0
DECREASED APPETITE: 1
WHEEZING: 0

## 2018-03-26 ENCOUNTER — DOCUMENTATION ONLY (OUTPATIENT)
Dept: TRANSPLANT | Facility: CLINIC | Age: 68
End: 2018-03-26

## 2018-03-26 DIAGNOSIS — Z94.4 LIVER REPLACED BY TRANSPLANT (H): ICD-10-CM

## 2018-03-26 RX ORDER — SIROLIMUS 0.5 MG/1
0.5 TABLET, FILM COATED ORAL EVERY OTHER DAY
Qty: 45 TABLET | Refills: 3 | Status: SHIPPED | OUTPATIENT
Start: 2018-03-26 | End: 2018-05-18

## 2018-03-27 ENCOUNTER — OFFICE VISIT (OUTPATIENT)
Dept: PULMONOLOGY | Facility: CLINIC | Age: 68
End: 2018-03-27
Attending: INTERNAL MEDICINE
Payer: MEDICARE

## 2018-03-27 ENCOUNTER — RADIANT APPOINTMENT (OUTPATIENT)
Dept: CT IMAGING | Facility: CLINIC | Age: 68
End: 2018-03-27
Attending: INTERNAL MEDICINE
Payer: MEDICARE

## 2018-03-27 VITALS
OXYGEN SATURATION: 96 % | HEART RATE: 75 BPM | BODY MASS INDEX: 28.83 KG/M2 | RESPIRATION RATE: 16 BRPM | DIASTOLIC BLOOD PRESSURE: 72 MMHG | SYSTOLIC BLOOD PRESSURE: 113 MMHG | WEIGHT: 186.7 LBS

## 2018-03-27 DIAGNOSIS — R91.8 PULMONARY NODULES: Primary | ICD-10-CM

## 2018-03-27 DIAGNOSIS — R91.8 PULMONARY NODULES: ICD-10-CM

## 2018-03-27 DIAGNOSIS — Z94.4 LIVER REPLACED BY TRANSPLANT (H): ICD-10-CM

## 2018-03-27 PROCEDURE — G0463 HOSPITAL OUTPT CLINIC VISIT: HCPCS | Mod: ZF

## 2018-03-27 ASSESSMENT — PAIN SCALES - GENERAL: PAINLEVEL: MILD PAIN (3)

## 2018-03-27 NOTE — MR AVS SNAPSHOT
After Visit Summary   3/27/2018    Luz Thompson    MRN: [id number]           Patient Information     Date Of Birth          1950        Visit Information        Provider Department      3/27/2018 3:30 PM Eden Clinton MD [facility]        Today's Diagnoses     Pulmonary nodules    -  1       Follow-ups after your visit        Additional Services     THORACIC SURGERY REFERRAL       Your provider has referred you to:  [facility]: Thoracic Surgery Clinic [facility] [phone]   [email or web address]    Please be aware that coverage of these services is subject to the terms and limitations of your health insurance plan.  Call member services at your health plan with any benefit or coverage questions.      Please bring the following to your appointment:    >>   Any x-rays, CTs or MRIs which have been performed.  Contact the facility where they were done to arrange for  prior to your scheduled appointment.    >>   List of current medications   >>   This referral request   >>   Any documents/labs given to you for this referral                  Follow-up notes from your care team     Return if symptoms worsen or fail to improve.      Your next 10 appointments already scheduled     May 15, 2018 11:00 AM CDT   (Arrive by 10:45 AM)   Return General Liver with Gentry Ramirez MD   [facility] ([facility])    [address] [place] [address]  [address]  [facility] [id number] [phone]            May 22, 2018  1:15 PM CDT   (Arrive by 1:00 PM)   Return Visit with Mavis Bermudez MD   [facility] Dermatology ([facility])    [address] [place] [address]  3rd Floor  [facility] [id number] [phone]            Sep 12, 2018  2:00 PM CDT   (Arrive by 1:45 PM)   Return Visit with Randell Reyna MD   [facility] ([facility])    [address] [place] [address]  Street   [address] [place] [id number] [phone]              Who to contact     If you have questions or need follow up information about today's clinic visit or your schedule please contact [facility] directly at [phone].  Normal or non-critical lab and imaging results will be communicated to you by MyChart, letter or phone within 4 business days after the clinic has received the results. If you do not hear from us within 7 days, please contact the clinic through [employer] or phone. If you have a critical or abnormal lab result, we will notify you by phone as soon as possible.  Submit refill requests through [employer] or call your pharmacy and they will forward the refill request to us. Please allow 3 business days for your refill to be completed.          Additional Information About Your Visit        [employer] Information     [employer] gives you secure access to your electronic health record. If you see a primary care provider, you can also send messages to your care team and make appointments. If you have questions, please call your primary care clinic.  If you do not have a primary care provider, please call [phone] and they will assist you.        Care EveryWhere ID     This is your Care EveryWhere ID. This could be used by other organizations to access your [facility] medical records  [id number]        Your Vitals Were     Pulse Respirations Pulse Oximetry BMI (Body Mass Index)          75 16 96% 28.83 kg/m2         Blood Pressure from Last 3 Encounters:   03/27/18 113/72   03/20/18 152/68   10/23/17 110/78    Weight from Last 3 Encounters:   03/27/18 84.7 kg (186 lb 11.2 oz)   10/23/17 83.5 kg (184 lb)   09/29/17 84.5 kg (186 lb 4.6 oz)              We Performed the Following     THORACIC SURGERY REFERRAL          Today's Medication Changes          These changes are accurate as of 3/27/18  4:15 PM.  If you have any questions, ask your nurse or doctor.                These medicines have changed or have updated prescriptions.        Dose/Directions    buPROPion 100 MG 12 hr tablet   Commonly known as:  WELLBUTRIN SR   This may have changed:    - how much to take  - additional instructions        Dose:  100 mg   Take 1 tablet (100 mg) by mouth once for 1 dose In the morning   Quantity:  1 tablet   Refills:  0       PRESERVISION AREDS 2 Caps   This may have changed:    - how much to take  - when to take this   Used for:  Dry eyes, bilateral        Dose:  2 capsule   Take 2 capsules by mouth daily   Refills:  0       triamcinolone 0.1 % cream   Commonly known as:  KENALOG   This may have changed:    - when to take this  - reasons to take this  - additional instructions   Used for:  Contact dermatitis and other eczema, due to unspecified cause        Apply topically 2 times daily Apply to rash   Quantity:  80 g   Refills:  3                Primary Care Provider Office Phone # Fax #    Jennifer Amparo Barraza [phone]       [address] [facility] [address]        Equal Access to Services     [facility] AH: Hadii brayan ku hadasho Soomaali, waaxda luqadaha, qaybta kaalmada adeegyada, waxay andrewin eulalio lu . So [facility] [phone].    ATENCIÓN: Si habla español, tiene a jason disposición servicios gratuitos de asistencia lingüística. [facility] [phone].    We comply with applicable federal civil rights laws and [place] laws. We do not discriminate on the basis of race, color, national origin, age, disability, sex, sexual orientation, or gender identity.            Thank you!     Thank you for choosing [facility]  for your care. Our goal is always to provide you with excellent care. Hearing back from our patients is one way we can continue to improve our services. Please take a few minutes to complete the written survey that you may receive in the mail after your visit with us. Thank you!             Your  Updated Medication List - Protect others around you: Learn how to safely use, store and throw away your medicines at www.disposemymeds.org.          This list is accurate as of 3/27/18  4:15 PM.  Always use your most recent med list.                   Brand Name Dispense Instructions for use Diagnosis    acetaminophen 500 MG Caps      Take 500 mg by mouth as needed Take 500-1,000 mg by mouth every 6 hours if needed.        ARTIFICIAL TEARS OP      Apply 1 drop to eye as needed        BENEFIBER Powd      2 teaspoons daily        buPROPion 100 MG 12 hr tablet    WELLBUTRIN SR    1 tablet    Take 1 tablet (100 mg) by mouth once for 1 dose In the morning        calcitRIOL 0.5 MCG capsule    ROCALTROL    90 capsule    Take 1 capsule (0.5 mcg) by mouth daily        clotrimazole 1 % cream    LOTRIMIN     Apply topically 2 times daily as needed Reported on 4/25/2017        ezetimibe 10 MG tablet    ZETIA    30 tablet    Take 0.5 tablets (5 mg) by mouth every evening        folic acid 1 MG tablet    FOLVITE    100 tablet    Take 1 tablet (1 mg) by mouth daily    Liver replaced by transplant (H)       furosemide 20 MG tablet    LASIX    46 tablet    Take 0.5 tablets (10 mg) by mouth daily    CKD (chronic kidney disease) stage 3, GFR 30-59 ml/min, Liver replaced by transplant (H)       hydrALAZINE 25 MG tablet    APRESOLINE    270 tablet    Take 0.5 tablets (12.5 mg) by mouth 3 times daily as needed , if systolic blood pressure is more than 140 mmHg.    HTN (hypertension)       levothyroxine 150 MCG tablet    SYNTHROID/LEVOTHROID    96 tablet    Take one tablet daily 6 days a week and one and a half tablets one day a week.    Postablative hypothyroidism       magnesium 250 MG tablet      Take 2 tablets by mouth daily At night.        meclizine 25 MG tablet    ANTIVERT    30 tablet    Take 1 tablet (25 mg) by mouth as needed    Dizziness       metoprolol succinate 50 MG 24 hr tablet    TOPROL-XL    90 tablet    Take 1 tablet  (50 mg) by mouth daily    Paroxysmal atrial fibrillation (H)       oxyCODONE-acetaminophen 5-325 MG per tablet    PERCOCET    10 tablet    Take 1 tablet by mouth every 6 hours as needed for pain maximum 4 tablet(s) per day    Abdominal pain       predniSONE 5 MG tablet    DELTASONE    90 tablet    Take 1 tablet (5 mg) by mouth daily    Thyroid cancer (H), Liver replaced by transplant (H)       PRESERVISION AREDS 2 Caps      Take 2 capsules by mouth daily    Dry eyes, bilateral       sirolimus 0.5 MG tablet    GENERIC EQUIVALENT    45 tablet    Take 1 tablet (0.5 mg) by mouth every other day    Liver replaced by transplant (H)       SUMAtriptan 50 MG tablet    IMITREX    30 tablet    Take 1 tablet (50 mg) by mouth at onset of headache for migraine repeat after 2 hours if needed.    Migraine without aura and without status migrainosus, not intractable       triamcinolone 0.1 % cream    KENALOG    80 g    Apply topically 2 times daily Apply to rash    Contact dermatitis and other eczema, due to unspecified cause       ursodiol 250 MG tablet    ACTIGALL    180 tablet    Take 1 tablet (250 mg) by mouth 2 times daily    Liver replaced by transplant (H)       voriconazole 200 MG tablet    VFEND    60 tablet    Take 1 tablet (200 mg) by mouth 2 times daily    Coccidioidal pneumonitis (H)

## 2018-03-27 NOTE — NURSING NOTE
Chief Complaint   Patient presents with     Breathing Problem     Patient is being seen for follow up of breathing issues      Petra Hernandez CMA at 3:12 PM on 3/27/2018

## 2018-03-27 NOTE — LETTER
3/27/2018       RE: Luz Thompson  [address]   [place] [address]     Dear Colleague,    Thank you for referring your patient, Luz Thompson, to the [facility] at [facility]. Please see a copy of my visit note below.    Reason for Visit  Luz Thompson is a 67 year old female who is seen in follow up of lung nodule, Valley Fever  Pulmonary HPI  She still has dry cough, did have an episode of bronchitis treated with antibiotics and steroids, Breo for a month. Still using rescue inhaler intermittently, does get relief. Still on treatment voriconazole, would maybe reduce dose after resection depending on pathology.     The patient was seen and examined by Eden Clinton MD   Current Outpatient Prescriptions   Medication     sirolimus (GENERIC EQUIVALENT) 0.5 MG tablet     buPROPion (WELLBUTRIN SR) 100 MG 12 hr tablet     ursodiol (ACTIGALL) 250 MG tablet     voriconazole (VFEND) 200 MG tablet     meclizine (ANTIVERT) 25 MG tablet     SUMAtriptan (IMITREX) 50 MG tablet     oxyCODONE-acetaminophen (PERCOCET) 5-325 MG per tablet     ezetimibe (ZETIA) 10 MG tablet     metoprolol (TOPROL-XL) 50 MG 24 hr tablet     folic acid (FOLVITE) 1 MG tablet     furosemide (LASIX) 20 MG tablet     hydrALAZINE (APRESOLINE) 25 MG tablet     levothyroxine (SYNTHROID/LEVOTHROID) 150 MCG tablet     calcitRIOL (ROCALTROL) 0.5 MCG capsule     predniSONE (DELTASONE) 5 MG tablet     clotrimazole (LOTRIMIN) 1 % cream     Wheat Dextrin (BENEFIBER) POWD     Multiple Vitamins-Minerals (PRESERVISION AREDS 2) CAPS     triamcinolone (KENALOG) 0.1 % cream     Hypromellose (ARTIFICIAL TEARS OP)     acetaminophen 500 MG CAPS     magnesium 250 MG tablet     No current facility-administered medications for this visit.      Allergies   Allergen Reactions     Fluconazole Hives and Itching     Azithromycin Itching     Benadryl [Diphenhydramine Hcl]       "Insomnia      Cefpodoxime Itching     Cellcept Diarrhea     Ciprofloxacin Other (See Comments)     Insomnia, mood lability     Codeine      Psych disturbance     Diphenhydramine Other (See Comments)     Doxycycline      Lansoprazole Diarrhea     Levaquin [Levofloxacin] Other (See Comments)     Headache, hyperactivity     Lisinopril Cough     Methotrexate      Sores     Morphine Itching     Morphine Sulfate Itching     Mycophenolate Diarrhea     Pravastatin Muscle Pain (Myalgia)     Simvastatin Muscle Pain (Myalgia)     severe     Cephalexin Rash     Fever and skin burning     Penicillin G Itching and Rash     Tolectin [Nsaids] Rash     Tramadol Rash     Social History     Social History     Marital status:      Spouse name: Robbin Thompson     Number of children: 4     Years of education: 20     Occupational History     [employer]  [employer]     social work      Self     Social History Main Topics     Smoking status: Former Smoker     Packs/day: 1.00     Years: 18.00     Types: Cigarettes     Quit date: 4/12/1985     Smokeless tobacco: Never Used     Alcohol use 0.0 oz/week     0 Standard drinks or equivalent per week      Comment: rare - \"I toast at weddings\"     Drug use: No     Sexual activity: Yes     Partners: Male     Birth control/ protection: Post-menopausal     Other Topics Concern     Exercise Yes     cardio and strengthing     Social History Narrative    She is retired. She and her  travel around the [place] in an [place]. [place] in [place] the course of the [place]. 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 Gentry Ramirez, [facility]       Macular degeneration      Migraines 4/4/2012     Osteoarthritis of right knee 8/2/2012     Osteoporosis 4/20/2012     Paroxysmal a-fib (H) 6/13/2017     Post-surgical hypothyroidism      Postablative hypothyroidism 8/13/2012     Primary biliary cirrhosis     s/p Liver transplant, [id number]     Sjogren's syndrome (H)      Statin intolerance      Unspecified cerebral artery occlusion with cerebral infarction 2001    when BP was very low, small multiple infacts in frontal lobe, had \"visual field cut,\" leg weakness, and expressive aphasia - all have resolved.      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     Past Surgical History:   Procedure Laterality Date     APPENDECTOMY  1961     BIOPSY       CATARACT IOL, RT/LT      RE12/19/2013, LE12/10/2013 - Toric lenses     CHOLECYSTECTOMY  1991     COLONOSCOPY       COLONOSCOPY  3/10/2014    Procedure: COLONOSCOPY;;  Surgeon: Gentry Ramirez MD;  Location:  GI     ENDOBRONCHIAL ULTRASOUND FLEXIBLE N/A 9/29/2017    Procedure: ENDOBRONCHIAL ULTRASOUND FLEXIBLE;  Flexible Bronchoscopy, Endobronchial Ultrasound, Transbronchial Needle Aspiration ;  Surgeon: Eden Clinton MD;  Location: [facility] OR     ENDOSCOPIC RETROGRADE CHOLANGIOPANCREATOGRAM  9/19/2013    Procedure: ENDOSCOPIC RETROGRADE CHOLANGIOPANCREATOGRAM;  Endoscopic Retrograde " Cholangiopancreatogram with single balloon enteroscopy, ballon sweep of bile duct;  Surgeon: Brett Membreno MD;  Location: [facility] OR     ENT SURGERY      ear drum repair     HC KNEE SCOPE,MED/LAT MENISECTOMY  8/10/12    Right, partial medial menisectomy only     KNEE SURGERY  1966    R knee     PICC INSERTION  2013    4fr SL PASV PICC, 40cm (1cm external) in the R basilic vein w/ tip in the low SVC     PICC INSERTION  2014    5 fr DL BioFlo Navilyst PICC, 46 cm (3 cm external) in the L basilic vein w/ tip in the SVC RA junction.     THYROIDECTOMY  3/2010     TRANSPLANT LIVER RECIPIENT LIVING UNRELATED       Family History   Problem Relation Age of Onset     Hypertension Mother      Endometrial Cancer Mother      Hyperlipidemia Mother      Prostate Cancer Father      Macular Degeneration Father      Cancer - colorectal Maternal Grandmother      in her 80's, has surgery and removal of part of kidney,  at age 98     HEART DISEASE Maternal Grandfather       at 98     Glaucoma Maternal Grandfather      CEREBROVASCULAR DISEASE Paternal Grandmother      in her 80's     Hypertension Paternal Grandmother      HEART DISEASE Paternal Grandfather      MI     Alzheimer Disease Paternal Grandfather      Allergies Son      Neurologic Disorder Daughter      Migraines     Breast Cancer Other      Anesthesia Reaction No family hx of      Crohn Disease No family hx of      Ulcerative Colitis No family hx of      ROS Pulmonary  Dyspnea: Yes, Cough: No, Chest pain: No, Wheezing: No, Sputum Production: No, Hemoptysis: No  A complete ROS was otherwise negative except as noted in the HPI.  /72 (BP Location: Right arm, Patient Position: Chair, Cuff Size: Adult Regular)  Pulse 75  Resp 16  Wt 84.7 kg (186 lb 11.2 oz)  SpO2 96%  BMI 28.83 kg/m2  Exam:   GENERAL APPEARANCE: Well developed, well nourished, alert, and in no apparent distress.  EYES: PERRL, EOMI  HENT: Nasal mucosa with no edema and no hyperemia.  No nasal polyps.  EARS: Canals clear, TMs normal  MOUTH: Oral mucosa is moist, without any lesions, no tonsillar enlargement, no oropharyngeal exudate.  NECK: supple, no masses, no thyromegaly.  LYMPHATICS: No significant axillary, cervical, or supraclavicular nodes.  RESP: normal percussion, good air flow throughout.  No crackles. No rhonchi. No wheezes.  CV: Normal S1, S2, regular rhythm, normal rate. No murmur.  No rub. No gallop. No LE edema.   ABDOMEN:  Bowel sounds normal, soft, nontender, no HSM or masses.   MS: extremities normal. No clubbing. No cyanosis.  SKIN: no rash on limited exam  NEURO: Mentation intact, speech normal, normal strength and tone, normal gait and stance  PSYCH: mentation appears normal. and affect normal/bright  Results:  PFTs 4/2017, normal spirometry and mildly reduced DLCO when corrected for hemoglobin   CT chest today images reviewed and compared to priors reviewed and persistent slight increase in time lingular opacity (also old RLL calcified nodules)    Assessment and plan: [place] is a 67-year-old female with a remote history of liver transplant for primary biliary cirrhosis who is seen for follow up of Valley Fever January 2017 in [place]. CT chest today shows enlarging nodular opacity. She remains on voriconazole suppression.    Dry cough -has been present since last year.  She has not received any specific treatment for this cough.  She did have a superimposed acute episode of bronchitis that resolved with antibiotics, steroids, short course of inhaled corticosteroids several months ago.  No longer taking maintenance inhaled medications.    Lung nodule -continued slow increase in the mildly PET avid lingula lung nodule.  She has undergone EBUS/TBNA here and then attempted percutaneous lung nodule biopsy in [place].  She was diagnosed with valley fever last winter.  Lymph node biopsy was adequate and demonstrated granulomatous inflammation.  According to her pulmonologist in  [place], this behavior is atypical of coccidiomycosis.  However, she never did have a definitive diagnosis it was more of a clinical diagnosis, she remains on treatment doses of voriconazole.  They recommended if the biopsy results are not consistent with coccidiomycosis infection, we should consider reducing voriconazole dose.  The patient has not yet seen Dr. Montero since returning from [place] for the summer    Plan today discussed with the patient, her daughter and her friend.  We will referred to thoracic surgery for discussion about wedge resection.    End of February CT disk mailed in but I can't see.     Again, thank you for allowing me to participate in the care of your patient.      Sincerely,    Eden Clinton MD

## 2018-03-27 NOTE — PROGRESS NOTES
Reason for Visit  Luz Thompson is a 67 year old female who is seen in follow up of lung nodule, Valley Fever  Pulmonary HPI  She still has dry cough, did have an episode of bronchitis treated with antibiotics and steroids, Breo for a month. Still using rescue inhaler intermittently, does get relief. Still on treatment voriconazole, would maybe reduce dose after resection depending on pathology.     The patient was seen and examined by Eden Clinton MD   Current Outpatient Prescriptions   Medication     sirolimus (GENERIC EQUIVALENT) 0.5 MG tablet     buPROPion (WELLBUTRIN SR) 100 MG 12 hr tablet     ursodiol (ACTIGALL) 250 MG tablet     voriconazole (VFEND) 200 MG tablet     meclizine (ANTIVERT) 25 MG tablet     SUMAtriptan (IMITREX) 50 MG tablet     oxyCODONE-acetaminophen (PERCOCET) 5-325 MG per tablet     ezetimibe (ZETIA) 10 MG tablet     metoprolol (TOPROL-XL) 50 MG 24 hr tablet     folic acid (FOLVITE) 1 MG tablet     furosemide (LASIX) 20 MG tablet     hydrALAZINE (APRESOLINE) 25 MG tablet     levothyroxine (SYNTHROID/LEVOTHROID) 150 MCG tablet     calcitRIOL (ROCALTROL) 0.5 MCG capsule     predniSONE (DELTASONE) 5 MG tablet     clotrimazole (LOTRIMIN) 1 % cream     Wheat Dextrin (BENEFIBER) POWD     Multiple Vitamins-Minerals (PRESERVISION AREDS 2) CAPS     triamcinolone (KENALOG) 0.1 % cream     Hypromellose (ARTIFICIAL TEARS OP)     acetaminophen 500 MG CAPS     magnesium 250 MG tablet     No current facility-administered medications for this visit.      Allergies   Allergen Reactions     Fluconazole Hives and Itching     Azithromycin Itching     Benadryl [Diphenhydramine Hcl]      Insomnia      Cefpodoxime Itching     Cellcept Diarrhea     Ciprofloxacin Other (See Comments)     Insomnia, mood lability     Codeine      Psych disturbance     Diphenhydramine Other (See Comments)     Doxycycline      Lansoprazole Diarrhea     Levaquin [Levofloxacin] Other (See Comments)     Headache, hyperactivity  "    Lisinopril Cough     Methotrexate      Sores     Morphine Itching     Morphine Sulfate Itching     Mycophenolate Diarrhea     Pravastatin Muscle Pain (Myalgia)     Simvastatin Muscle Pain (Myalgia)     severe     Cephalexin Rash     Fever and skin burning     Penicillin G Itching and Rash     Tolectin [Nsaids] Rash     Tramadol Rash     Social History     Social History     Marital status:      Spouse name: Robbin Thompson     Number of children: 4     Years of education: 20     Occupational History     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 She and her  travel around the [place] in an RV. [place] usually are in [place] over the course of the winter. 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       Macular degeneration      Migraines 4/4/2012     Osteoarthritis of right knee 8/2/2012     Osteoporosis 4/20/2012     Paroxysmal a-fib (H) 6/13/2017     Post-surgical hypothyroidism      Postablative hypothyroidism 8/13/2012     Primary biliary cirrhosis     s/p Liver transplant, [id number]     Sjogren's syndrome (H)      Statin intolerance      Unspecified cerebral artery occlusion with cerebral infarction 2001    when BP was very low, small multiple infacts in frontal lobe, had \"visual field cut,\" leg weakness, and expressive aphasia - all have resolved.      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     Past Surgical History:   Procedure Laterality Date     APPENDECTOMY  1961     BIOPSY       CATARACT IOL, RT/LT      RE12/19/2013, LE12/10/2013 - Toric lenses     CHOLECYSTECTOMY  1991     COLONOSCOPY       COLONOSCOPY  3/10/2014    Procedure: COLONOSCOPY;;  Surgeon: Gentry Ramirez MD;  Location:  GI     ENDOBRONCHIAL ULTRASOUND FLEXIBLE N/A 9/29/2017    Procedure: ENDOBRONCHIAL ULTRASOUND FLEXIBLE;  Flexible Bronchoscopy, Endobronchial Ultrasound, Transbronchial Needle Aspiration ;  Surgeon: Eden Clinton MD;  Location:  OR     ENDOSCOPIC RETROGRADE CHOLANGIOPANCREATOGRAM  9/19/2013    Procedure: ENDOSCOPIC RETROGRADE CHOLANGIOPANCREATOGRAM;  Endoscopic Retrograde Cholangiopancreatogram with single balloon enteroscopy, ballon sweep of bile duct;  Surgeon: Brett Membreno MD;  Location:  OR     ENT SURGERY      ear drum repair     HC KNEE SCOPE,MED/LAT MENISECTOMY  8/10/12    Right, partial medial menisectomy only     KNEE SURGERY  1966    R knee     PICC INSERTION  9/18/2013    4fr PEREZ HAWKINS " PICC, 40cm (1cm external) in the R basilic vein w/ tip in the low SVC     PICC INSERTION  2014    5 fr DL BioFlo Navilyst PICC, 46 cm (3 cm external) in the L basilic vein w/ tip in the SVC RA junction.     THYROIDECTOMY  3/2010     TRANSPLANT LIVER RECIPIENT LIVING UNRELATED       Family History   Problem Relation Age of Onset     Hypertension Mother      Endometrial Cancer Mother      Hyperlipidemia Mother      Prostate Cancer Father      Macular Degeneration Father      Cancer - colorectal Maternal Grandmother      in her 80's, has surgery and removal of part of kidney,  at age 98     HEART DISEASE Maternal Grandfather       at 98     Glaucoma Maternal Grandfather      CEREBROVASCULAR DISEASE Paternal Grandmother      in her 80's     Hypertension Paternal Grandmother      HEART DISEASE Paternal Grandfather      MI     Alzheimer Disease Paternal Grandfather      Allergies Son      Neurologic Disorder Daughter      Migraines     Breast Cancer Other      Anesthesia Reaction No family hx of      Crohn Disease No family hx of      Ulcerative Colitis No family hx of      ROS Pulmonary  Dyspnea: Yes, Cough: No, Chest pain: No, Wheezing: No, Sputum Production: No, Hemoptysis: No  A complete ROS was otherwise negative except as noted in the HPI.  /72 (BP Location: Right arm, Patient Position: Chair, Cuff Size: Adult Regular)  Pulse 75  Resp 16  Wt 84.7 kg (186 lb 11.2 oz)  SpO2 96%  BMI 28.83 kg/m2  Exam:   GENERAL APPEARANCE: Well developed, well nourished, alert, and in no apparent distress.  EYES: PERRL, EOMI  HENT: Nasal mucosa with no edema and no hyperemia. No nasal polyps.  EARS: Canals clear, TMs normal  MOUTH: Oral mucosa is moist, without any lesions, no tonsillar enlargement, no oropharyngeal exudate.  NECK: supple, no masses, no thyromegaly.  LYMPHATICS: No significant axillary, cervical, or supraclavicular nodes.  RESP: normal percussion, good air flow throughout.  No crackles.  No rhonchi. No wheezes.  CV: Normal S1, S2, regular rhythm, normal rate. No murmur.  No rub. No gallop. No LE edema.   ABDOMEN:  Bowel sounds normal, soft, nontender, no HSM or masses.   MS: extremities normal. No clubbing. No cyanosis.  SKIN: no rash on limited exam  NEURO: Mentation intact, speech normal, normal strength and tone, normal gait and stance  PSYCH: mentation appears normal. and affect normal/bright  Results:  PFTs 4/2017, normal spirometry and mildly reduced DLCO when corrected for hemoglobin   CT chest today images reviewed and compared to priors reviewed and persistent slight increase in time lingular opacity (also old RLL calcified nodules)    Assessment and plan: Virginia is a 67-year-old female with a remote history of liver transplant for primary biliary cirrhosis who is seen for follow up of Valley Fever January 2017 in [place]. CT chest today shows enlarging nodular opacity. She remains on voriconazole suppression.    Dry cough -has been present since last year.  She has not received any specific treatment for this cough.  She did have a superimposed acute episode of bronchitis that resolved with antibiotics, steroids, short course of inhaled corticosteroids several months ago.  No longer taking maintenance inhaled medications.    Lung nodule -continued slow increase in the mildly PET avid lingula lung nodule.  She has undergone EBUS/TBNA here and then attempted percutaneous lung nodule biopsy in [place].  She was diagnosed with valley fever last winter.  Lymph node biopsy was adequate and demonstrated granulomatous inflammation.  According to her pulmonologist in [place], this behavior is atypical of coccidiomycosis.  However, she never did have a definitive diagnosis it was more of a clinical diagnosis, she remains on treatment doses of voriconazole.  They recommended if the biopsy results are not consistent with coccidiomycosis infection, we should consider reducing voriconazole dose.  The  patient has not yet seen Dr. Montero since returning from [place] for the summer      Plan today discussed with the patient, her daughter and her friend.  We will referred to thoracic surgery for discussion about wedge resection.        End of February CT disk mailed in but I can't see.

## 2018-03-28 ENCOUNTER — TELEPHONE (OUTPATIENT)
Dept: TRANSPLANT | Facility: CLINIC | Age: 68
End: 2018-03-28

## 2018-03-28 LAB — RADIOLOGIST FLAGS: NORMAL

## 2018-03-28 NOTE — TELEPHONE ENCOUNTER
Prior Authorization Specialty Medication Request    Medication/Dose: Sirolimus 0.5 mg every other day   ICD code (if different than what is on RX):  z94.4  Renewal    Insurance Name:[employer]    Insurance Phone Number:     Pharmacy Information (if different than what is on RX)  [facility]:  [facility]

## 2018-03-29 ENCOUNTER — PRE VISIT (OUTPATIENT)
Dept: UROLOGY | Facility: CLINIC | Age: 68
End: 2018-03-29

## 2018-03-29 DIAGNOSIS — C73 THYROID CANCER (H): ICD-10-CM

## 2018-03-29 DIAGNOSIS — Z94.4 LIVER REPLACED BY TRANSPLANT (H): ICD-10-CM

## 2018-03-29 RX ORDER — PREDNISONE 5 MG/1
5 TABLET ORAL DAILY
Qty: 90 TABLET | Refills: 3 | Status: SHIPPED | OUTPATIENT
Start: 2018-03-29 | End: 2019-05-28

## 2018-03-29 NOTE — TELEPHONE ENCOUNTER
RECORDS RECEIVED FROM: in epic   DATE RECEIVED: in epic   NOTES STATUS DETAILS   OFFICE NOTE from referring provider {Records Status:609021    OFFICE NOTES from other specialists {Records Status:072046    DISCHARGE SUMMARY from hospital {Records Status:438617    DISCHARGE REPORT from the ER {Records Status:638537    OPERATIVE REPORT {Records Status:262549    MEDICATION LIST {Records Status:455499    LABS     URINALYSIS (UA) {Records Status:872348    URINE CYTOLOGY {Records Status:528697    DIAGNOSIS SPECIFIC LABS     OTHER: consult for uti {Records Status:312410 Records in epic.. cdk

## 2018-03-30 ENCOUNTER — TELEPHONE (OUTPATIENT)
Dept: TRANSPLANT | Facility: CLINIC | Age: 68
End: 2018-03-30

## 2018-03-30 DIAGNOSIS — R91.1 LUNG NODULE: Primary | ICD-10-CM

## 2018-03-30 NOTE — TELEPHONE ENCOUNTER
[facility] Prior Authorization Team   Phone: [phone]  Fax: [phone]    PA Initiation    Medication: predniSONE 5 MG tablet   Insurance Company: [employer] Phone [phone] Fax [phone]  Pharmacy Filling the Rx: [facility]   Filling Pharmacy Phone:    Filling Pharmacy Fax:    Start Date: 3/30/2018

## 2018-03-30 NOTE — TELEPHONE ENCOUNTER
[facility] Prior Authorization Team   Phone: [phone]  Fax: [phone]    PA Initiation    Medication: sirolimus 0.5 MG tablet   Insurance Company: [employer] - Phone [phone] Fax [phone]  [facility] Rx: [facility]   Filling Pharmacy Phone:    Filling Pharmacy Fax:    Start Date: 3/30/2018

## 2018-03-30 NOTE — TELEPHONE ENCOUNTER
Prior Authorization Specialty Medication Request    Medication/Dose: Prednisone   ICD code (if different than what is on RX):z94.4       Pharmacy Information (if different than what is on RX)  Name:  [facility]

## 2018-04-02 ENCOUNTER — HOSPITAL ENCOUNTER (EMERGENCY)
Facility: CLINIC | Age: 68
Discharge: HOME | End: 2018-04-02
Attending: EMERGENCY MEDICINE | Admitting: EMERGENCY MEDICINE
Payer: MEDICARE

## 2018-04-02 VITALS
HEART RATE: 68 BPM | OXYGEN SATURATION: 95 % | TEMPERATURE: 98.4 F | SYSTOLIC BLOOD PRESSURE: 143 MMHG | RESPIRATION RATE: 18 BRPM | DIASTOLIC BLOOD PRESSURE: 70 MMHG

## 2018-04-02 DIAGNOSIS — R52 BURNING PAIN: ICD-10-CM

## 2018-04-02 DIAGNOSIS — T50.905A ADVERSE EFFECT OF DRUG, INITIAL ENCOUNTER: ICD-10-CM

## 2018-04-02 LAB — INTERPRETATION ECG - MUSE: NORMAL

## 2018-04-02 PROCEDURE — 93005 ELECTROCARDIOGRAM TRACING: CPT

## 2018-04-02 PROCEDURE — 99283 EMERGENCY DEPT VISIT LOW MDM: CPT

## 2018-04-02 ASSESSMENT — ENCOUNTER SYMPTOMS
VOMITING: 0
COUGH: 0
TROUBLE SWALLOWING: 0
DIARRHEA: 0
MYALGIAS: 1
FEVER: 0

## 2018-04-02 NOTE — ED AVS SNAPSHOT
[facility] Emergency Department    [address] [place] [address]    Phone:  [phone]    Fax:  [phone]                                       Luz Thompson   MRN: [id number]    Department:  [facility] Emergency Department   Date of Visit:  4/2/2018           Patient Information     Date Of Birth          1950        Your diagnoses for this visit were:     Burning pain     Possible Adverse effect of drug, initial encounter        You were seen by Gentry Lugo MD.      Follow-up Information     Follow up with Jennifer Barraza MD.    Specialty:  Family Practice    Contact information:    [address]  [place] [address]  [phone]        Discharge References/Attachments     DRUG REACTION, OTHER (ENGLISH)      Your next 10 appointments already scheduled     May 02, 2018 10:00 AM CDT   (Arrive by 9:45 AM)   New Patient Visit with Sandeep Waddell MD   [facility] Urology and [facility] ([facility])    [address] [place] [address]  4th Floor  [facility] [id number] [phone]            May 15, 2018 11:00 AM CDT   (Arrive by 10:45 AM)   Return General Liver with Gentry Ramirez MD   [facility] Hepatology ([facility])    [address] [place] [address]  [address]  [facility] [id number] [phone]            May 22, 2018  1:15 PM CDT   (Arrive by 1:00 PM)   Return Visit with Mavis Bermudez MD   [facility] Dermatology ([facility])    [address] [place] [address]  3rd Floor  [facility] [id number] [phone]            Sep 12, 2018  2:00 PM CDT   (Arrive by 1:45 PM)   Return Visit with Randell Reyna MD   [facility] Heart Care ([facility])    [address] [place] [address]  [address]  [facility] [address] [phone]              24 Hour Appointment Hotline       To make an appointment at any [facility], call  [phone] ([phone]). If you don't have a family doctor or clinic, we will help you find one. [facility] clinics are conveniently located to serve the needs of you and your family.             Review of your medicines      Our records show that you are taking the medicines listed below. If these are incorrect, please call your family doctor or clinic.        Dose / Directions Last dose taken    acetaminophen 500 MG Caps   Dose:  500 mg        Take 500 mg by mouth as needed Take 500-1,000 mg by mouth every 6 hours if needed.   Refills:  0        ARTIFICIAL TEARS OP   Dose:  1 drop        Apply 1 drop to eye as needed   Refills:  0        BENEFIBER Powd        2 teaspoons daily   Refills:  0        buPROPion 100 MG 12 hr tablet   Commonly known as:  WELLBUTRIN SR   Dose:  100 mg   Quantity:  1 tablet        Take 1 tablet (100 mg) by mouth once for 1 dose In the morning   Refills:  0        calcitRIOL 0.5 MCG capsule   Commonly known as:  ROCALTROL   Dose:  0.5 mcg   Quantity:  90 capsule        Take 1 capsule (0.5 mcg) by mouth daily   Refills:  3        clotrimazole 1 % cream   Commonly known as:  LOTRIMIN        Apply topically 2 times daily as needed Reported on 4/25/2017   Refills:  0        ezetimibe 10 MG tablet   Commonly known as:  ZETIA   Dose:  5 mg   Quantity:  30 tablet        Take 0.5 tablets (5 mg) by mouth every evening   Refills:  0        folic acid 1 MG tablet   Commonly known as:  FOLVITE   Dose:  1 mg   Quantity:  100 tablet        Take 1 tablet (1 mg) by mouth daily   Refills:  3        furosemide 20 MG tablet   Commonly known as:  LASIX   Dose:  10 mg   Quantity:  46 tablet        Take 0.5 tablets (10 mg) by mouth daily   Refills:  3        hydrALAZINE 25 MG tablet   Commonly known as:  APRESOLINE   Dose:  12.5 mg   Quantity:  270 tablet        Take 0.5 tablets (12.5 mg) by mouth 3 times daily as needed , if systolic blood pressure is more than 140 mmHg.   Refills:  3         levothyroxine 150 MCG tablet   Commonly known as:  SYNTHROID/LEVOTHROID   Quantity:  96 tablet        Take one tablet daily 6 days a week and one and a half tablets one day a week.   Refills:  3        magnesium 250 MG tablet   Dose:  2 tablet        Take 2 tablets by mouth daily At night.   Refills:  0        meclizine 25 MG tablet   Commonly known as:  ANTIVERT   Dose:  25 mg   Quantity:  30 tablet        Take 1 tablet (25 mg) by mouth as needed   Refills:  11        metoprolol succinate 50 MG 24 hr tablet   Commonly known as:  TOPROL-XL   Dose:  50 mg   Quantity:  90 tablet        Take 1 tablet (50 mg) by mouth daily   Refills:  3        oxyCODONE-acetaminophen 5-325 MG per tablet   Commonly known as:  PERCOCET   Dose:  1 tablet   Quantity:  10 tablet        Take 1 tablet by mouth every 6 hours as needed for pain maximum 4 tablet(s) per day   Refills:  0        predniSONE 5 MG tablet   Commonly known as:  DELTASONE   Dose:  5 mg   Quantity:  90 tablet        Take 1 tablet (5 mg) by mouth daily   Refills:  3        PRESERVISION AREDS 2 Caps   Dose:  2 capsule        Take 2 capsules by mouth daily   Refills:  0        sirolimus 0.5 MG tablet   Commonly known as:  GENERIC EQUIVALENT   Dose:  0.5 mg   Quantity:  45 tablet        Take 1 tablet (0.5 mg) by mouth every other day   Refills:  3        SUMAtriptan 50 MG tablet   Commonly known as:  IMITREX   Dose:  50 mg   Quantity:  30 tablet        Take 1 tablet (50 mg) by mouth at onset of headache for migraine repeat after 2 hours if needed.   Refills:  3        triamcinolone 0.1 % cream   Commonly known as:  KENALOG   Quantity:  80 g        Apply topically 2 times daily Apply to rash   Refills:  3        ursodiol 250 MG tablet   Commonly known as:  ACTIGALL   Dose:  250 mg   Quantity:  180 tablet        Take 1 tablet (250 mg) by mouth 2 times daily   Refills:  3        voriconazole 200 MG tablet   Commonly known as:  VFEND   Dose:  200 mg   Quantity:  60 tablet         Take 1 tablet (200 mg) by mouth 2 times daily   Refills:  5                Procedures and tests performed during your visit     EKG 12 lead      Orders Needing Specimen Collection     None      Pending Results     Date and Time Order Name Status Description    4/2/2018 0253 EKG 12 lead Preliminary             Pending Culture Results     No orders found from 3/31/2018 to 4/3/2018.            Pending Results Instructions     If you had any lab results that were not finalized at the time of your Discharge, you can call the ED Lab Result RN at [phone]. You will be contacted by this team for any positive Lab results or changes in treatment. The nurses are available 7 days a week from 10A to 6:30P.  You can leave a message 24 hours per day and they will return your call.        Test Results From Your Hospital Stay               Clinical Quality Measure: Blood Pressure Screening     Your blood pressure was checked while you were in the emergency department today. The last reading we obtained was  BP: 130/75 . Please read the guidelines below about what these numbers mean and what you should do about them.  If your systolic blood pressure (the top number) is less than 120 and your diastolic blood pressure (the bottom number) is less than 80, then your blood pressure is normal. There is nothing more that you need to do about it.  If your systolic blood pressure (the top number) is 120-139 or your diastolic blood pressure (the bottom number) is 80-89, your blood pressure may be higher than it should be. You should have your blood pressure rechecked within a year by a primary care provider.  If your systolic blood pressure (the top number) is 140 or greater or your diastolic blood pressure (the bottom number) is 90 or greater, you may have high blood pressure. High blood pressure is treatable, but if left untreated over time it can put you at risk for heart attack, stroke, or kidney failure. You should have your blood  pressure rechecked by a primary care provider within the next 4 weeks.  If your provider in the emergency department today gave you specific instructions to follow-up with your doctor or provider even sooner than that, you should follow that instruction and not wait for up to 4 weeks for your follow-up visit.        Thank you for choosing [facility]       Thank you for choosing [facility] for your care. Our goal is always to provide you with excellent care. Hearing back from our patients is one way we can continue to improve our services. Please take a few minutes to complete the written survey that you may receive in the mail after you visit with us. Thank you!        [facility] Information     [facility] gives you secure access to your electronic health record. If you see a primary care provider, you can also send messages to your care team and make appointments. If you have questions, please call your primary care clinic.  If you do not have a primary care provider, please call [phone] and they will assist you.        Care EveryWhere ID     This is your Care EveryWhere ID. This could be used by other organizations to access your [facility] medical records  [id number]        Equal Access to Services     GENE NOWAK : Hadkaylee Ma, waloida denny, qaismael brizuela, etta hardy. So [facility] [phone].    ATENCIÓN: Si habla español, tiene a jason disposición servicios gratuitos de asistencia lingüística. Llame al [phone].    We comply with applicable federal civil rights laws and [place] laws. We do not discriminate on the basis of race, color, national origin, age, disability, sex, sexual orientation, or gender identity.            After Visit Summary       This is your record. Keep this with you and show to your community pharmacist(s) and doctor(s) at your next visit.

## 2018-04-02 NOTE — ED NOTES
Pt reports that she thinks she is having an allergic reaction as her lips are tingling and her muscles hurt. Has an extensive allergy list

## 2018-04-02 NOTE — ED PROVIDER NOTES
"  History     Chief Complaint:  Possible Allergic Reaction    HPI   Luz Thompson is a 67 year old female with complex past medical history and an extensive allergy list who presents to the ED for evaluation of lip and muscle tingling & burning. The patient reports that she began having muscular \"burning\" and tingling in her lips around 0100 this morning. She notes that they recently increased her Wellbutrin to 200 from 100 three days ago, and she believes this might be secondary to her medication change, so she presented to the ED tonight. She had initially been started on Wellbutrin the week before.  She denies any recent fevers, cough, vomiting, diarrhea, trouble breathing, or rashes. She notes that she has had several allergic reactions in the past, but usually has rashes with them and hand tingling rather than her current symptoms.    Allergies:  Fluconazole  Azithromycin  Benadryl  Cefpodoxime  Cellcept  Ciprofloxacin  Codeine  Diphenhydramine  Doxycycline  Lansoprazole  Levaquin  Lisinopril  Methotrexate  Morphine  Mycophenolate  Pravastatin  Simvastatin  Cephalexin  Penicillin  NSAIDS  Tramadol    Medications:    Prednisone  Sirolimus  Wellbutrin  Actigall  Voriconazole  Meclizine  Imitrex  Percocet  Zetia  Metoprolol  Lasix  Apresoline  Levothyroxine  Calcitriol  Lotrimin  Kenalog  Magnesium    Past Medical History:    Anemia  Anxiety  Chronic bladder infection  CKD  Coccidioidomycosis  CVA  Diverticulosis  EBV  Esophageal varices  Hearing loss  Heart Murmur  DVT  [facility] Fever  Thyroid cancer  Hyperlipidemia  HTN  Hypertriglyceridemia  Macular degeneration  Migraines  Osteoarthritis  Cataracts  Osteoporosis  Paroxysmal atrial fibrillation  Hypothyroidism  Biliary cirrhosis  Sjogren's syndrome  Glaucoma  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   HENT: Negative for trouble swallowing.    Respiratory: Negative for cough.    Gastrointestinal: Negative for diarrhea and vomiting.   Musculoskeletal: Positive for myalgias.   Skin: Negative for rash.   All other systems reviewed and are negative.    Physical Exam     Patient Vitals for the past 24 hrs:   BP Temp Temp src Heart Rate Resp SpO2   04/02/18 0250 130/75 98.4  F (36.9  C) Oral 68 18 100 %     Physical Exam  Nursing note and vitals reviewed.  Constitutional: Cooperative. Resting comfortably.   HENT:   Mouth/Throat: Mucous membranes are normal. No edema to the posterior oropharynx or angioedema to the lips or tongue.   Cardiovascular: Normal rate, regular rhythm and normal heart sounds.  No murmur.  Pulmonary/Chest: Effort normal and breath sounds normal. No respiratory distress. No wheezes.   Abdominal: Soft. Normal appearance. There is no tenderness.    Musculoskeletal: No LE edema   Neurological: Alert. Oriented x4  Skin: Skin is warm and dry. No rash or urticaria noted.   Psychiatric: Normal mood and affect.     Emergency Department Course   ECG:  Indication: Possible Allergic Reaction  Time: 0259  Vent. Rate 62 bpm. NY interval 200. QRS duration 86. QT/QTc 442/448. P-R-T axis 48 16 43.  Normal sinus rhythm. Normal ECG. Read time: 0305    Emergency Department Course:  Nursing notes and vitals reviewed. (0300) I performed an exam of the patient as documented above.     (0316) I rechecked the patient and discussed the results of her workup thus far. Discussed with her the results of my review of reported Wellbutrin side effects    Findings and plan explained to  the Patient. Patient discharged home with instructions regarding supportive care, medications, and reasons to return. The importance of close follow-up was reviewed.    I personally answered all related questions prior to discharge.     Impression & Plan      Medical Decision Making:  Luz Thompson is a 67 year old female with a complex history as detailed above who presents with a burning sensation in all extremities and her lips. She has no objective edema or angioedema symptoms to the mouth, lips, or tongue. No urticaria or other objective skin findings. Her vitals are normal and she is afebrile. Lungs are clear without wheezing. There is no stigmata of anaphylaxis. This burning pain essentially involves her entire body, making stroke very unlikely. Her neurologic exam is otherwise reassuring. It certainly could be a side effect of the increase in her Wellbutrin, which is what she is concerned about. A quick review of the side effects does show muscle and joint pain as a common effect. Recomendation would be to stop this medication and discuss with her physician whether the lower dose of this medication would be appropriate for her going forward or whether they would want to change her medications. No other indication needed. Triage EKG reviewed and within normal limits.    Diagnosis:    ICD-10-CM   1. Burning pain R52   2. Possible Adverse effect of drug, initial encounter T88.7XXA     Disposition:  discharged to home    Scribe Disclosure:  I, Hina Wayne, am serving as a scribe on 4/2/2018 at 2:52 AM to personally document services performed by Gentry Lugo MD based on my observations and the provider's statements to me.     Hina Wayne  4/2/2018   [facility] EMERGENCY DEPARTMENT       Gentry Lugo MD  04/02/18 0326

## 2018-04-02 NOTE — ED AVS SNAPSHOT
[facility] Emergency Department    [address]    [facility] [address]    Phone:  [phone]    Fax:  [phone]                                       Luz Thompson   MRN: [id number]    Department:  [facility] Emergency Department   Date of Visit:  4/2/2018           After Visit Summary Signature Page     I have received my discharge instructions, and my questions have been answered. I have discussed any challenges I see with this plan with the nurse or doctor.    ..........................................................................................................................................  Patient/Patient Representative Signature      ..........................................................................................................................................  Patient Representative Print Name and Relationship to Patient    ..................................................               ................................................  Date                                            Time    ..........................................................................................................................................  Reviewed by Signature/Title    ...................................................              ..............................................  Date                                                            Time

## 2018-04-02 NOTE — TELEPHONE ENCOUNTER
[facility] Prior Authorization Team   Phone: [phone]  Fax: [phone]    Prior Authorization Approval    Authorization Effective Date: 3/30/2018  Authorization Expiration Date: 3/30/2019  Medication: sirolimus 0.5 MG tablet   Approved Dose/Quantity: Take 1 tablet (0.5 mg) by mouth every other day / #15  Reference #: CMM KEY#: [id number]   Insurance Company: [employer] - Phone [phone] Fax [phone]  Expected CoPay: $100.82     CoPay Card Available:      Foundation Assistance Needed:    Which Pharmacy is filling the prescription (Not needed for infusion/clinic administered):    Pharmacy Notified: No  Patient Notified: No

## 2018-04-03 NOTE — TELEPHONE ENCOUNTER
Called [facility] to advise PA for prednisone approved. Test claim run and shows paid.

## 2018-04-03 NOTE — TELEPHONE ENCOUNTER
[facility] Prior Authorization Team   Phone: [phone]  Fax: [phone]    Prior Authorization Approval    Authorization Effective Date: 4/3/2018  Authorization Expiration Date: 4/3/2020  Medication: predniSONE 5 MG tablet   Approved Dose/Quantity: Take 1 tablet (5 mg) by mouth daily / #30  Reference #: CMM KEY#: [id number]   Insurance Company: [employer] - Phone [phone] Fax [phone]  Expected CoPay: $5.00     CoPay Card Available:      Foundation Assistance Needed:    Which Pharmacy is filling the prescription (Not needed for infusion/clinic administered):    Pharmacy Notified: No  Patient Notified: No

## 2018-04-17 DIAGNOSIS — Z94.4 LIVER REPLACED BY TRANSPLANT (H): ICD-10-CM

## 2018-04-17 LAB
ERYTHROCYTE [DISTWIDTH] IN BLOOD BY AUTOMATED COUNT: 17.1 % (ref 10–15)
HCT VFR BLD AUTO: 37.7 % (ref 35–47)
HGB BLD-MCNC: 11.9 G/DL (ref 11.7–15.7)
MCH RBC QN AUTO: 28.1 PG (ref 26.5–33)
MCHC RBC AUTO-ENTMCNC: 31.6 G/DL (ref 31.5–36.5)
MCV RBC AUTO: 89 FL (ref 78–100)
PLATELET # BLD AUTO: 348 10E9/L (ref 150–450)
RBC # BLD AUTO: 4.24 10E12/L (ref 3.8–5.2)
SIROLIMUS BLD-MCNC: 6.2 UG/L (ref 5–15)
TME LAST DOSE: 930 H
WBC # BLD AUTO: 8.3 10E9/L (ref 4–11)

## 2018-04-17 PROCEDURE — 80048 BASIC METABOLIC PNL TOTAL CA: CPT | Performed by: INTERNAL MEDICINE

## 2018-04-17 PROCEDURE — 80195 ASSAY OF SIROLIMUS: CPT | Performed by: INTERNAL MEDICINE

## 2018-04-17 PROCEDURE — 85027 COMPLETE CBC AUTOMATED: CPT | Performed by: FAMILY MEDICINE

## 2018-04-17 PROCEDURE — 80076 HEPATIC FUNCTION PANEL: CPT | Performed by: INTERNAL MEDICINE

## 2018-04-17 PROCEDURE — 36415 COLL VENOUS BLD VENIPUNCTURE: CPT | Performed by: INTERNAL MEDICINE

## 2018-04-17 PROCEDURE — 83735 ASSAY OF MAGNESIUM: CPT | Performed by: FAMILY MEDICINE

## 2018-04-17 NOTE — PROGRESS NOTES
THORACIC SURGERY - NEW PATIENT OFFICE VISIT      Dear Dr. Barraza,    I saw Ms. Thompson at Dr. Wills s request in consultation for the evaluation and treatment of a lung nodule.   HPI  Ms. Luz Thompson is a 68 year old year-old female who had chest CTs following coccidiomycosis in [place]. She was found to have a left upper lobe nodule. Attempted percutaneous lung biopsy in [place], at that time showed granulomatous inflammation? Unable to find this report. Reports some shortness of breath with   Previsit Tests   8/8/2017 PET: SUV 2.6 lingular pulmonary nodule, small hilar lymph nodes with increased SVU     9/29/2017 EBUS/TBNA: sampling of Station 4L and 11L -> FNA shows granuloma   3/27/2018 CT: 1.3 cm nodule in the lingula, stable since previous    4/18 PFTs: FEV1 2.22/ 88% DLCO 65%    PMH  Liver transplant for primary biliary cirrhosis in 2002  CKD stage III  CVA  Afib on Eliquis   HLD  HTN  Thyroidectomy 2010    ETOH: none  TOB: none    Physical examination  BMI 28  Non-contributory    From a personal perspective, she is here with her daughter    IMPRESSION (R91.1) Lung nodule  (primary encounter diagnosis)  68 year old year-old female with a lingular lung nodule.     PLAN  I spent a total of 30 minutes with Ms. Thompson and her daughter, more than 50% of which were spent in counseling, coordination of care, and face-to-face time. I reviewed the plan as follows:  1) Follow up plan:   -three months with repeat chest CT  They had all their questions answered and were in agreement with the plan.  I appreciate the opportunity to participate in the care of your patient and will keep you updated.  Sincerely,

## 2018-04-18 ENCOUNTER — OFFICE VISIT (OUTPATIENT)
Dept: SURGERY | Facility: CLINIC | Age: 68
End: 2018-04-18
Attending: THORACIC SURGERY (CARDIOTHORACIC VASCULAR SURGERY)
Payer: MEDICARE

## 2018-04-18 VITALS
TEMPERATURE: 99.4 F | OXYGEN SATURATION: 95 % | SYSTOLIC BLOOD PRESSURE: 124 MMHG | HEIGHT: 67 IN | BODY MASS INDEX: 28.39 KG/M2 | WEIGHT: 180.9 LBS | HEART RATE: 75 BPM | RESPIRATION RATE: 18 BRPM | DIASTOLIC BLOOD PRESSURE: 61 MMHG

## 2018-04-18 DIAGNOSIS — R91.8 PULMONARY NODULES: Primary | ICD-10-CM

## 2018-04-18 DIAGNOSIS — R91.1 LUNG NODULE: Primary | ICD-10-CM

## 2018-04-18 LAB
ALBUMIN SERPL-MCNC: 3.3 G/DL (ref 3.4–5)
ALP SERPL-CCNC: 211 U/L (ref 40–150)
ALT SERPL W P-5'-P-CCNC: 42 U/L (ref 0–50)
ANION GAP SERPL CALCULATED.3IONS-SCNC: 8 MMOL/L (ref 3–14)
AST SERPL W P-5'-P-CCNC: 26 U/L (ref 0–45)
BILIRUB DIRECT SERPL-MCNC: <0.1 MG/DL (ref 0–0.2)
BILIRUB SERPL-MCNC: 0.3 MG/DL (ref 0.2–1.3)
BUN SERPL-MCNC: 36 MG/DL (ref 7–30)
CALCIUM SERPL-MCNC: 9.1 MG/DL (ref 8.5–10.1)
CHLORIDE SERPL-SCNC: 102 MMOL/L (ref 94–109)
CO2 SERPL-SCNC: 28 MMOL/L (ref 20–32)
CREAT SERPL-MCNC: 1.74 MG/DL (ref 0.52–1.04)
GFR SERPL CREATININE-BSD FRML MDRD: 29 ML/MIN/1.7M2
GLUCOSE SERPL-MCNC: 125 MG/DL (ref 70–99)
MAGNESIUM SERPL-MCNC: 2.8 MG/DL (ref 1.6–2.3)
POTASSIUM SERPL-SCNC: 3.9 MMOL/L (ref 3.4–5.3)
PROT SERPL-MCNC: 7.7 G/DL (ref 6.8–8.8)
SODIUM SERPL-SCNC: 138 MMOL/L (ref 133–144)

## 2018-04-18 PROCEDURE — 40000114 ZZH STATISTIC NO CHARGE CLINIC VISIT

## 2018-04-18 RX ORDER — APIXABAN 2.5 MG/1
2.5 TABLET, FILM COATED ORAL 2 TIMES DAILY
COMMUNITY
Start: 2018-03-28 | End: 2018-10-02

## 2018-04-18 RX ORDER — CLINDAMYCIN PHOSPHATE 900 MG/50ML
900 INJECTION, SOLUTION INTRAVENOUS SEE ADMIN INSTRUCTIONS
Status: CANCELLED | OUTPATIENT
Start: 2018-04-18

## 2018-04-18 RX ORDER — CLINDAMYCIN PHOSPHATE 900 MG/50ML
900 INJECTION, SOLUTION INTRAVENOUS
Status: CANCELLED | OUTPATIENT
Start: 2018-04-18

## 2018-04-18 ASSESSMENT — PAIN SCALES - GENERAL: PAINLEVEL: MODERATE PAIN (5)

## 2018-04-18 NOTE — MR AVS SNAPSHOT
After Visit Summary   4/18/2018    Luz Thompson    MRN: [id number]           Patient Information     Date Of Birth          1950        Visit Information        Provider Department      4/18/2018 1:30 PM Preet Villegas MD [facility]        Today's Diagnoses     Lung nodule    -  1       Follow-ups after your visit        Follow-up notes from your care team     Return in about 3 months (around 7/18/2018).      Your next 10 appointments already scheduled     May 02, 2018 10:00 AM CDT   (Arrive by 9:45 AM)   New Patient Visit with Sandeep Waddell MD   [facility]rology [facility] ([facility])    [address]  4th Floor  [facility] [address] [phone]            May 15, 2018 11:00 AM CDT   (Arrive by 10:45 AM)   Return General Liver with Gentry Ramirez MD   [facility] Hepatology ([facility])    [address]  [address]  [facility] [address] [phone]            May 22, 2018  1:15 PM CDT   (Arrive by 1:00 PM)   Return Visit with Mavis Bermudez MD   [facility] Dermatology ([facility])    [address]  3rd Floor  [facility] [address] [phone]            Jun 20, 2018 12:00 PM CDT   (Arrive by 11:45 AM)   CT CHEST W/O CONTRAST with UCCT2   [facility] [place] [facility] ([facility])    [address]  1st [address]  [facility] [address] [phone]           Please bring any scans or X-rays taken at other hospitals, if similar tests were done. Also bring a list of your medicines, including vitamins, minerals and over-the-counter drugs. It is safest to leave personal items at home.  Be sure to tell your doctor:   If you have any allergies.   If there s any chance you are pregnant.   If you are breastfeeding.  You do not need to do anything special to prepare for this exam.   Please wear loose clothing, such as a sweat suit or jogging clothes. Avoid snaps, zippers and other metal. We may ask you to undress and put on a hospital gown.            Jun 20, 2018  1:00 PM CDT   (Arrive by 12:45 PM)   Return Visit with Preet Villegas MD   [facility] ([facility])    [address]  [address]  [facility] [address] [phone]            Sep 12, 2018  2:00 PM CDT   (Arrive by 1:45 PM)   Return Visit with Randell Reyna MD   [facility] ([facility])    [address]  [address]  [facility] [address] [phone]              Future tests that were ordered for you today     Open Future Orders        Priority Expected Expires Ordered    CT Chest w/o contrast Routine  4/18/2019 4/18/2018            Who to contact     If you have questions or need follow up information about today's clinic visit or your schedule please contact [facility] directly at [phone].  Normal or non-critical lab and imaging results will be communicated to you by [employer], letter or phone within 4 business days after the clinic has received the results. If you do not hear from us within 7 days, please contact the clinic through [employer] or phone. If you have a critical or abnormal lab result, we will notify you by phone as soon as possible.  Submit refill requests through [employer] or call your pharmacy and they will forward the refill request to us. Please allow 3 business days for your refill to be completed.          Additional Information About Your Visit        [employer] Information     [employer] gives you secure access to your electronic health record. If you see a primary care provider, you can also send messages to your care team and make appointments. If you have questions, please call your primary care clinic.  If you do not have a primary care provider, please call [phone] and they  "will assist you.        Care EveryWhere ID     This is your Care EveryWhere ID. This could be used by other organizations to access your [facility] medical records  [id number]        Your Vitals Were     Pulse Temperature Respirations Height Pulse Oximetry BMI (Body Mass Index)    75 99.4  F (37.4  C) (Oral) 18 1.702 m (5' 7\") 95% 28.33 kg/m2       Blood Pressure from Last 3 Encounters:   04/18/18 124/61   04/02/18 143/70   03/27/18 113/72    Weight from Last 3 Encounters:   04/18/18 82.1 kg (180 lb 14.4 oz)   03/27/18 84.7 kg (186 lb 11.2 oz)   10/23/17 83.5 kg (184 lb)                 Today's Medication Changes          These changes are accurate as of 4/18/18  2:27 PM.  If you have any questions, ask your nurse or doctor.               These medicines have changed or have updated prescriptions.        Dose/Directions    buPROPion 100 MG 12 hr tablet   Commonly known as:  WELLBUTRIN SR   This may have changed:    - how much to take  - additional instructions        Dose:  100 mg   Take 1 tablet (100 mg) by mouth once for 1 dose In the morning   Quantity:  1 tablet   Refills:  0       PRESERVISION AREDS 2 Caps   This may have changed:    - how much to take  - when to take this   Used for:  Dry eyes, bilateral        Dose:  2 capsule   Take 2 capsules by mouth daily   Refills:  0       triamcinolone 0.1 % cream   Commonly known as:  KENALOG   This may have changed:    - when to take this  - reasons to take this  - additional instructions   Used for:  Contact dermatitis and other eczema, due to unspecified cause        Apply topically 2 times daily Apply to rash   Quantity:  80 g   Refills:  3                Primary Care Provider Office Phone # Fax #    Jennifer Amparo Barraza [phone]       [address]  [facility] [address]        Equal Access to Services     GENE NOWAK AH: Valerie tyo Sokelsi, waaxda luqadaha, qaybta kaalmada adeegyada, etta hardy. So " [facility] [place] [phone].    ATENCIÓN: Si maurala cindy, tiene a jason disposición servicios gratuitos de asistencia lingüística. Jonas mendez [phone].    We comply with applicable federal civil rights laws and [place] laws. We do not discriminate on the basis of race, color, national origin, age, disability, sex, sexual orientation, or gender identity.            Thank you!     Thank you for choosing [facility]  for your care. Our goal is always to provide you with excellent care. Hearing back from our patients is one way we can continue to improve our services. Please take a few minutes to complete the written survey that you may receive in the mail after your visit with us. Thank you!             Your Updated Medication List - Protect others around you: Learn how to safely use, store and throw away your medicines at www.disposemymeds.org.          This list is accurate as of 4/18/18  2:27 PM.  Always use your most recent med list.                   Brand Name Dispense Instructions for use Diagnosis    acetaminophen 500 MG Caps      Take 500 mg by mouth as needed Take 500-1,000 mg by mouth every 6 hours if needed.        ARTIFICIAL TEARS OP      Apply 1 drop to eye as needed        BENEFIBER Powd      2 teaspoons daily        buPROPion 100 MG 12 hr tablet    WELLBUTRIN SR    1 tablet    Take 1 tablet (100 mg) by mouth once for 1 dose In the morning        calcitRIOL 0.5 MCG capsule    ROCALTROL    90 capsule    Take 1 capsule (0.5 mcg) by mouth daily        clotrimazole 1 % cream    LOTRIMIN     Apply topically 2 times daily as needed Reported on 4/25/2017        ELIQUIS 2.5 MG tablet   Generic drug:  apixaban ANTICOAGULANT           ezetimibe 10 MG tablet    ZETIA    30 tablet    Take 0.5 tablets (5 mg) by mouth every evening        folic acid 1 MG tablet    FOLVITE    100 tablet    Take 1 tablet (1 mg) by mouth daily    Liver replaced by transplant (H)       furosemide 20 MG tablet    LASIX    46  tablet    Take 0.5 tablets (10 mg) by mouth daily    CKD (chronic kidney disease) stage 3, GFR 30-59 ml/min, Liver replaced by transplant (H)       hydrALAZINE 25 MG tablet    APRESOLINE    270 tablet    Take 0.5 tablets (12.5 mg) by mouth 3 times daily as needed , if systolic blood pressure is more than 140 mmHg.    HTN (hypertension)       levothyroxine 150 MCG tablet    SYNTHROID/LEVOTHROID    96 tablet    Take one tablet daily 6 days a week and one and a half tablets one day a week.    Postablative hypothyroidism       magnesium 250 MG tablet      Take 2 tablets by mouth daily At night.        meclizine 25 MG tablet    ANTIVERT    30 tablet    Take 1 tablet (25 mg) by mouth as needed    Dizziness       metoprolol succinate 50 MG 24 hr tablet    TOPROL-XL    90 tablet    Take 1 tablet (50 mg) by mouth daily    Paroxysmal atrial fibrillation (H)       oxyCODONE-acetaminophen 5-325 MG per tablet    PERCOCET    10 tablet    Take 1 tablet by mouth every 6 hours as needed for pain maximum 4 tablet(s) per day    Abdominal pain       predniSONE 5 MG tablet    DELTASONE    90 tablet    Take 1 tablet (5 mg) by mouth daily    Thyroid cancer (H), Liver replaced by transplant (H)       PRESERVISION AREDS 2 Caps      Take 2 capsules by mouth daily    Dry eyes, bilateral       sirolimus 0.5 MG tablet    GENERIC EQUIVALENT    45 tablet    Take 1 tablet (0.5 mg) by mouth every other day    Liver replaced by transplant (H)       SUMAtriptan 50 MG tablet    IMITREX    30 tablet    Take 1 tablet (50 mg) by mouth at onset of headache for migraine repeat after 2 hours if needed.    Migraine without aura and without status migrainosus, not intractable       triamcinolone 0.1 % cream    KENALOG    80 g    Apply topically 2 times daily Apply to rash    Contact dermatitis and other eczema, due to unspecified cause       ursodiol 250 MG tablet    ACTIGALL    180 tablet    Take 1 tablet (250 mg) by mouth 2 times daily    Liver replaced by  transplant (H)       voriconazole 200 MG tablet    VFEND    60 tablet    Take 1 tablet (200 mg) by mouth 2 times daily    Coccidioidal pneumonitis (H)

## 2018-04-18 NOTE — LETTER
4/18/2018      RE: Luz Thompson  [address]   [facility] [address]       THORACIC SURGERY - NEW PATIENT OFFICE VISIT      Dear Dr. Barraza,    I saw Ms. Thompson at Dr. Wills s request in consultation for the evaluation and treatment of a lung nodule.   HPI  Ms. Luz Thompson is a 68 year old year-old female who had chest CTs following coccidiomycosis in [place]. She was found to have a left upper lobe nodule. Attempted percutaneous lung biopsy in [place], at that time showed granulomatous inflammation? Unable to find this report. Reports some shortness of breath with   Previsit Tests   8/8/2017 PET: SUV 2.6 lingular pulmonary nodule, small hilar lymph nodes with increased SVU     9/29/2017 EBUS/TBNA: sampling of Station 4L and 11L -> FNA shows granuloma   3/27/2018 CT: 1.3 cm nodule in the lingula, stable since previous    4/18 PFTs: FEV1 2.22/ 88% DLCO 65%    PMH  Liver transplant for primary biliary cirrhosis in 2002  CKD stage III  CVA  Afib on Eliquis   HLD  HTN  Thyroidectomy 2010    ETOH: none  TOB: none    Physical examination  BMI 28  Non-contributory    From a personal perspective, she is here with her daughter    IMPRESSION (R91.1) Lung nodule  (primary encounter diagnosis)  68 year old year-old female with a lingular lung nodule.     PLAN  I spent a total of 30 minutes with Ms. Thompson and her daughter, more than 50% of which were spent in counseling, coordination of care, and face-to-face time. I reviewed the plan as follows:  1) Follow up plan:   -three months with repeat chest CT  They had all their questions answered and were in agreement with the plan.  I appreciate the opportunity to participate in the care of your patient and will keep you updated.  Sincerely,      Preet Villegas MD

## 2018-04-19 ENCOUNTER — MYC MEDICAL ADVICE (OUTPATIENT)
Dept: FAMILY MEDICINE | Facility: CLINIC | Age: 68
End: 2018-04-19

## 2018-04-24 ENCOUNTER — PRE VISIT (OUTPATIENT)
Dept: UROLOGY | Facility: CLINIC | Age: 68
End: 2018-04-24

## 2018-04-24 DIAGNOSIS — F32.A DEPRESSION: Primary | ICD-10-CM

## 2018-04-24 NOTE — TELEPHONE ENCOUNTER
Patient with history of UTI coming in for consult with Dr. Waddell. Patient chart reviewed, no need for call, all records available and ready for appointment.

## 2018-04-25 LAB
DLCOCOR-%PRED-PRE: 65 %
DLCOCOR-PRE: 14.49 ML/MIN/MMHG
DLCOUNC-%PRED-PRE: 62 %
DLCOUNC-PRE: 13.78 ML/MIN/MMHG
DLCOUNC-PRED: 22.05 ML/MIN/MMHG
ERV-%PRED-PRE: 118 %
ERV-PRE: 0.81 L
ERV-PRED: 0.68 L
EXPTIME-PRE: 6.78 SEC
FEF2575-%PRED-PRE: 119 %
FEF2575-PRE: 2.47 L/SEC
FEF2575-PRED: 2.06 L/SEC
FEFMAX-%PRED-PRE: 116 %
FEFMAX-PRE: 7.28 L/SEC
FEFMAX-PRED: 6.27 L/SEC
FEV1-%PRED-PRE: 88 %
FEV1-PRE: 2.22 L
FEV1FEV6-PRE: 84 %
FEV1FEV6-PRED: 79 %
FEV1FVC-PRE: 84 %
FEV1FVC-PRED: 76 %
FEV1SVC-PRE: 88 %
FEV1SVC-PRED: 71 %
FIFMAX-PRE: 5.24 L/SEC
FRCN2-%PRED-PRE: 104 %
FRCN2-PRE: 3.04 L
FRCN2-PRED: 2.91 L
FVC-%PRED-PRE: 80 %
FVC-PRE: 2.63 L
FVC-PRED: 3.25 L
IC-%PRED-PRE: 60 %
IC-PRE: 1.73 L
IC-PRED: 2.85 L
RVN2-%PRED-PRE: 100 %
RVN2-PRE: 2.23 L
RVN2-PRED: 2.21 L
TLCN2-%PRED-PRE: 86 %
TLCN2-PRE: 4.76 L
TLCN2-PRED: 5.53 L
VA-%PRED-PRE: 64 %
VA-PRE: 3.65 L
VC-%PRED-PRE: 71 %
VC-PRE: 2.53 L
VC-PRED: 3.53 L

## 2018-04-30 ASSESSMENT — PATIENT HEALTH QUESTIONNAIRE - PHQ9
SUM OF ALL RESPONSES TO PHQ QUESTIONS 1-9: 5
10. IF YOU CHECKED OFF ANY PROBLEMS, HOW DIFFICULT HAVE THESE PROBLEMS MADE IT FOR YOU TO DO YOUR WORK, TAKE CARE OF THINGS AT HOME, OR GET ALONG WITH OTHER PEOPLE: SOMEWHAT DIFFICULT
SUM OF ALL RESPONSES TO PHQ QUESTIONS 1-9: 5

## 2018-05-01 ASSESSMENT — PATIENT HEALTH QUESTIONNAIRE - PHQ9: SUM OF ALL RESPONSES TO PHQ QUESTIONS 1-9: 5

## 2018-05-02 ENCOUNTER — OFFICE VISIT (OUTPATIENT)
Dept: UROLOGY | Facility: CLINIC | Age: 68
End: 2018-05-02
Payer: MEDICARE

## 2018-05-02 VITALS
HEART RATE: 79 BPM | BODY MASS INDEX: 28.41 KG/M2 | HEIGHT: 67 IN | DIASTOLIC BLOOD PRESSURE: 66 MMHG | WEIGHT: 181 LBS | SYSTOLIC BLOOD PRESSURE: 115 MMHG

## 2018-05-02 DIAGNOSIS — N95.2 ATROPHIC VAGINITIS: ICD-10-CM

## 2018-05-02 DIAGNOSIS — N39.0 URINARY TRACT INFECTION WITHOUT HEMATURIA, SITE UNSPECIFIED: Primary | ICD-10-CM

## 2018-05-02 LAB
APPEARANCE UR: CLEAR
BILIRUB UR QL: ABNORMAL
COLOR UR: YELLOW
GLUCOSE URINE: ABNORMAL MG/DL
HGB UR QL: ABNORMAL
KETONES UR QL: ABNORMAL MG/DL
LEUKOCYTE ESTERASE URINE: ABNORMAL
NITRITE UR QL STRIP: ABNORMAL
PH UR STRIP: 6 PH (ref 5–7)
PROTEIN ALBUMIN URINE: 30 MG/DL
SOURCE: ABNORMAL
SP GR UR STRIP: 1.01 (ref 1–1.03)
UROBILINOGEN UR QL STRIP: 0.2 EU/DL (ref 0.2–1)

## 2018-05-02 RX ORDER — NITROFURANTOIN 25; 75 MG/1; MG/1
100 CAPSULE ORAL 2 TIMES DAILY
Qty: 14 CAPSULE | Refills: 6 | Status: ON HOLD | OUTPATIENT
Start: 2018-05-02 | End: 2019-08-31

## 2018-05-02 RX ORDER — ESTRADIOL 0.1 MG/G
2 CREAM VAGINAL
Qty: 42.5 G | Refills: 11 | Status: SHIPPED | OUTPATIENT
Start: 2018-05-03 | End: 2019-08-27

## 2018-05-02 ASSESSMENT — ENCOUNTER SYMPTOMS
SWOLLEN GLANDS: 0
HEMATURIA: 0
COUGH: 1
DECREASED CONCENTRATION: 1
DYSPNEA ON EXERTION: 1
DEPRESSION: 1
WHEEZING: 0
DIFFICULTY URINATING: 0
PANIC: 0
DECREASED APPETITE: 1
WEIGHT GAIN: 0
SHORTNESS OF BREATH: 1
FEVER: 0
CHILLS: 0
HALLUCINATIONS: 0
SPUTUM PRODUCTION: 0
POLYDIPSIA: 0
INSOMNIA: 1
FLANK PAIN: 0
NERVOUS/ANXIOUS: 0
INCREASED ENERGY: 1
BRUISES/BLEEDS EASILY: 1
DYSURIA: 0
NIGHT SWEATS: 0
ALTERED TEMPERATURE REGULATION: 1
WEIGHT LOSS: 1
FATIGUE: 1
HEMOPTYSIS: 0
SNORES LOUDLY: 1
POLYPHAGIA: 0

## 2018-05-02 ASSESSMENT — PAIN SCALES - GENERAL: PAINLEVEL: NO PAIN (0)

## 2018-05-02 NOTE — PROGRESS NOTES
"CC: Recurrent UTIs    HPI:  Luz Thompson is a 68 year old female asked to be seen in consultation for the above. She has a history of liver transplant in 2002 and had issues with recurrent UTIs for several years following her transplant. She was last seen in our office in 2013 at which time she was not having any UTIs off of antibiotic prophylaxis and was discharged from clinic.     Since that time, she has had two episodes of UTI symptoms in the past two years, both during her winter stays in [place]. She did not have a urine culture with the first UTI but did have one with the second which was negative per pt report. She was treated with nitrofurantoin for both episodes with complete resolution of her symptoms. She is asymptomatic today. PVR is 0 in clinic. She has previously used estrogen cream but has not used this in several years.     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      Diverticulosis of sigmoid colon 12/21/2013     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     Received Rituxan during Summer of 2016     H/O esophageal varices      Hearing loss      Heart murmur 4/4/2012     History of DVT (deep vein thrombosis)      History of [facility] fever      History of thyroid cancer 9/25/2012     Hyperlipidemia 4/10/2012    Says that she does not have it anymore, not on meds     Hypertension goal BP (blood pressure) < 140/80 11/6/2013     Hypertriglyceridemia      Liver replaced by transplant (H) 10/17/2011    Dr. 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       Past Surgical History:   Procedure Laterality Date     APPENDECTOMY  1961     BIOPSY       CATARACT IOL, RT/LT      RE12/19/2013, LE12/10/2013 - Toric lenses     CHOLECYSTECTOMY  1991     COLONOSCOPY  3/10/2014    Procedure: COLONOSCOPY;;  Surgeon: Gentry Ramirez MD;  Location: [facility]     ear drum repair       ENDOBRONCHIAL ULTRASOUND FLEXIBLE N/A 9/29/2017    Procedure: ENDOBRONCHIAL ULTRASOUND FLEXIBLE;  Flexible Bronchoscopy, Endobronchial Ultrasound, Transbronchial Needle Aspiration ;  Surgeon: Eden Clinton MD;  Location: [facility] OR     ENDOSCOPIC RETROGRADE CHOLANGIOPANCREATOGRAM  9/19/2013    Procedure: ENDOSCOPIC RETROGRADE CHOLANGIOPANCREATOGRAM;  Endoscopic Retrograde Cholangiopancreatogram with single balloon enteroscopy, ballon sweep of bile duct;  Surgeon: Brett Membreno MD;  Location: [facility] OR      KNEE SCOPE,MED/LAT MENISECTOMY  8/10/12    Right, partial medial menisectomy only     KNEE SURGERY  1966    R knee     PICC INSERTION  9/18/2013    4fr SL PASV PICC, 40cm (1cm external) in the R basilic vein w/ tip in the low SVC     PICC INSERTION  2/21/2014    5 fr DL BioFlo Navilyst PICC, 46 cm (3 cm external) in the L basilic vein w/ tip in the SVC RA junction.     THYROIDECTOMY  3/2010     TRANSPLANT LIVER RECIPIENT LIVING UNRELATED         Meds, Allergies, FHx and SHx reviewed per nurse's intake note.    ROS is negative on a 14 point scale except for positive and pertinent information mentioned in the HPI.    PEx:   Blood pressure 115/66, pulse 79, height 1.702 m (5' 7\"), weight 82.1 kg (181 lb), not currently breastfeeding.  5' 7\", Body mass index is 28.35 " kg/(m^2)., 181 lbs 0 oz  Gen appearance:  Well groomed  HEENT:  EOMI, AT NC  Psych:  Normal Affect  Neuro:  A/O X 3  Skin:  Warm to touch  Resp:  No increased respiratory effort      RU: 0 on bladder scan by nursing.    UA: UA RESULTS:  Recent Labs   Lab Test  05/02/18   1008  08/31/17   2100   COLOR  Yellow  Light Yellow   APPEARANCE  Clear  Slightly Cloudy   URINEGLC  Neg  Negative   URINEBILI  Neg  Negative   URINEKETONE  Neg  Negative   SG  1.010  1.010   UBLD  Neg  Small*   URINEPH  6.0  8.0*   PROTEIN  30   30*   UROBILINOGEN  0.2   --    NITRITE  Neg  Negative   LEUKEST  Trace  Small*   RBCU   --   3*   WBCU   --   2       ASSESSMENT and PLAN:  This is a 68 year old female with 2 urinary tract infections in the past 2 years.  Different management options were discussed with the patient including observation, cystoscopy, prophylactic antibiotics, self start antibiotics, and estrogen cream.    -The patient has elected to proceed with estrogen cream and self start antibiotics given that she travels extensively. She will contact us if she develops more than 1-2 UTIs per year; otherwise she will follow up PRN.     Ricardo Jason MD  Urology Resident    Patient was seen, evaluated and plan was formulated in conjunction with me and I agree with the above.  Sandeep Waddell MD    Answers for HPI/ROS submitted by the patient on 5/2/2018   If you checked off any problems, how difficult have these problems made it for you to do your work, take care of things at home, or get along with other people?: Somewhat difficult  PHQ9 TOTAL SCORE: 5  General Symptoms: Yes  Skin Symptoms: No  HENT Symptoms: No  EYE SYMPTOMS: No  HEART SYMPTOMS: No  LUNG SYMPTOMS: Yes  INTESTINAL SYMPTOMS: No  URINARY SYMPTOMS: Yes  GYNECOLOGIC SYMPTOMS: No  BREAST SYMPTOMS: No  SKELETAL SYMPTOMS: No  BLOOD SYMPTOMS: Yes  NERVOUS SYSTEM SYMPTOMS: No  MENTAL HEALTH SYMPTOMS: Yes  Fever: No  Loss of appetite: Yes  Weight loss: Yes  Weight gain:  No  Fatigue: Yes  Night sweats: No  Chills: No  Increased stress: Yes  Excessive hunger: No  Excessive thirst: No  Feeling hot or cold when others believe the temperature is normal: Yes  Loss of height: No  Post-operative complications: No  Surgical site pain: No  Hallucinations: No  Change in or Loss of Energy: Yes  Hyperactivity: No  Confusion: No  Cough: Yes  Sputum or phlegm: No  Coughing up blood: No  Difficulty breating or shortness of breath: Yes  Snoring: Yes  Wheezing: No  Difficulty breathing on exertion: Yes  Trouble holding urine or incontinence: Yes  Pain or burning: No  Trouble starting or stopping: No  Increased frequency of urination: No  Blood in urine: No  Decreased frequency of urination: No  Frequent nighttime urination: No  Flank pain: No  Difficulty emptying bladder: No  Anemia: Yes  Swollen glands: No  Easy bleeding or bruising: Yes  Edema or swelling: Yes  Nervous or Anxious: No  Depression: Yes  Trouble sleeping: Yes  Trouble thinking or concentrating: Yes  Mood changes: No  Panic attacks: No

## 2018-05-02 NOTE — MR AVS SNAPSHOT
After Visit Summary   5/2/2018    Luz Thompson    MRN: [id number]           Patient Information     Date Of Birth          1950        Visit Information        Provider Department      5/2/2018 10:00 AM Sandeep Waddell MD [facility]rology and [facility] for Prostate and Urologic Cancers        Today's Diagnoses     Urinary tract infection without hematuria, site unspecified    -  1    Atrophic vaginitis           Follow-ups after your visit        Follow-up notes from your care team     Return if symptoms worsen or fail to improve.      Your next 10 appointments already scheduled     May 04, 2018 10:45 AM CDT   Adult General Eval with LIZ Nesbitt CNS   Psychiatry Clinic ([facility])    [address]  [address] [address] [phone]            May 15, 2018 11:00 AM CDT   (Arrive by 10:45 AM)   Return General Liver with Gentry Ramirez MD   [facility] Hepat[facility] ([facility])    [address] [place] [address]  [address]  [facility] [address] [phone]            May 22, 2018  1:15 PM CDT   (Arrive by 1:00 PM)   Return Visit with Mavis Bermudez MD   [facility] Dermat[facility] ([facility])    [address]  3rd Floor  [facility] [id number] [phone]            Jun 20, 2018 12:00 PM CDT   CT CHEST W/O CONTRAST with UCCT2   [facility] ([facility])    [address] [place] [address]  1st Floor  [facility] [address] [phone]           Please bring any scans or X-rays taken at other hospitals, if similar tests were done. Also bring a list of your medicines, including vitamins, minerals and over-the-counter drugs. It is safest to leave personal items at home.  Be sure to tell your doctor:   If you have any allergies.   If there s any chance you are pregnant.   If you are breastfeeding.  You do not need to do anything  "special to prepare for this exam.  Please wear loose clothing, such as a sweat suit or jogging clothes. Avoid snaps, zippers and other metal. We may ask you to undress and put on a hospital gown.            Jun 20, 2018  1:00 PM CDT   (Arrive by 12:45 PM)   Return Visit with Preet Villegas MD   [facility]    [address]  [address]  [facility] [address] [phone]            Sep 12, 2018  2:00 PM CDT   (Arrive by 1:45 PM)   Return Visit with Randell Reyna MD   [facility]    [address]  [address]  [facility] [address] [phone]              Who to contact     Please call your clinic at [phone] to:    Ask questions about your health    Make or cancel appointments    Discuss your medicines    Learn about your test results    Speak to your doctor            Additional Information About Your Visit        [employer] Information     [employer] gives you secure access to your electronic health record. If you see a primary care provider, you can also send messages to your care team and make appointments. If you have questions, please call your primary care clinic.  If you do not have a primary care provider, please call [phone] and they will assist you.      [employer] is an electronic gateway that provides easy, online access to your medical records. With [employer], you can request a clinic appointment, read your test results, renew a prescription or communicate with your care team.     To access your existing account, please contact your [facility] or call [phone] for assistance.        Care EveryWhere ID     This is your Care EveryWhere ID. This could be used by other organizations to access your [facility] medical records  [id number]        Your Vitals Were     Pulse Height BMI (Body Mass Index)             79 1.702 m (5' 7\") 28.35 " kg/m2          Blood Pressure from Last 3 Encounters:   05/02/18 115/66   04/18/18 124/61   04/02/18 143/70    Weight from Last 3 Encounters:   05/02/18 82.1 kg (181 lb)   04/18/18 82.1 kg (180 lb 14.4 oz)   03/27/18 84.7 kg (186 lb 11.2 oz)              We Performed the Following     POST-VOID RESIDUAL BLADDER SCAN     Urinalysis chemical screen POCT          Today's Medication Changes          These changes are accurate as of 5/2/18 10:42 AM.  If you have any questions, ask your nurse or doctor.               Start taking these medicines.        Dose/Directions    estradiol 0.1 MG/GM cream   Commonly known as:  ESTRACE VAGINAL   Used for:  Atrophic vaginitis   Started by:  Sandeep Waddell MD        Dose:  2 g   Start taking on:  5/3/2018   Place 2 g vaginally twice a week   Quantity:  42.5 g   Refills:  11       nitroFURantoin (macrocrystal-monohydrate) 100 MG capsule   Commonly known as:  MACROBID   Used for:  Urinary tract infection without hematuria, site unspecified   Started by:  Sandeep Waddell MD        Dose:  100 mg   Take 1 capsule (100 mg) by mouth 2 times daily For one week at onset of UTI symptoms   Quantity:  14 capsule   Refills:  6         These medicines have changed or have updated prescriptions.        Dose/Directions    buPROPion 100 MG 12 hr tablet   Commonly known as:  WELLBUTRIN SR   This may have changed:    - how much to take  - additional instructions        Dose:  100 mg   Take 1 tablet (100 mg) by mouth once for 1 dose In the morning   Quantity:  1 tablet   Refills:  0       PRESERVISION AREDS 2 Caps   This may have changed:    - how much to take  - when to take this   Used for:  Dry eyes, bilateral        Dose:  2 capsule   Take 2 capsules by mouth daily   Refills:  0       triamcinolone 0.1 % cream   Commonly known as:  KENALOG   This may have changed:    - when to take this  - reasons to take this  - additional instructions   Used for:  Contact dermatitis and other eczema, due  to unspecified cause        Apply topically 2 times daily Apply to rash   Quantity:  80 g   Refills:  3            Where to get your medicines      These medications were sent to [id number] - [place], [place] - [address] [address]  [address] [address]     Phone:  [phone]     estradiol 0.1 MG/GM cream    nitroFURantoin (macrocrystal-monohydrate) 100 MG capsule                Primary Care Provider Office Phone # Fax #    Jennifer Amparo Barraza [phone] [address]  [facility] [address]        Equal Access to Services     [facility]: Hadii aad ku hadasho Soomaali, waaxda luqadaha, qaybta kaalmada adeegyada, waxay andrewin hayaan дмитрий lu . So [facility] [phone].    ATENCIÓN: Si habla español, tiene a jason disposición servicios gratuitos de asistencia lingüística. [facility] [phone].    We comply with applicable federal civil rights laws and [place] laws. We do not discriminate on the basis of race, color, national origin, age, disability, sex, sexual orientation, or gender identity.            Thank you!     Thank you for choosing [facility] UROLOGY AND [facility] FOR PROSTATE AND UROLOGIC CANCERS  for your care. Our goal is always to provide you with excellent care. Hearing back from our patients is one way we can continue to improve our services. Please take a few minutes to complete the written survey that you may receive in the mail after your visit with us. Thank you!             Your Updated Medication List - Protect others around you: Learn how to safely use, store and throw away your medicines at www.disposemymeds.org.          This list is accurate as of 5/2/18 10:42 AM.  Always use your most recent med list.                   Brand Name Dispense Instructions for use Diagnosis    acetaminophen 500 MG Caps      Take 500 mg by mouth as needed Take 500-1,000 mg by mouth every 6 hours if needed.        ARTIFICIAL TEARS OP      Apply 1 drop to  eye as needed        BENEFIBER Powd      2 teaspoons daily        buPROPion 100 MG 12 hr tablet    WELLBUTRIN SR    1 tablet    Take 1 tablet (100 mg) by mouth once for 1 dose In the morning        calcitRIOL 0.5 MCG capsule    ROCALTROL    90 capsule    Take 1 capsule (0.5 mcg) by mouth daily        clotrimazole 1 % cream    LOTRIMIN     Apply topically 2 times daily as needed Reported on 4/25/2017        ELIQUIS 2.5 MG tablet   Generic drug:  apixaban ANTICOAGULANT           estradiol 0.1 MG/GM cream   Start taking on:  5/3/2018    ESTRACE VAGINAL    42.5 g    Place 2 g vaginally twice a week    Atrophic vaginitis       ezetimibe 10 MG tablet    ZETIA    30 tablet    Take 0.5 tablets (5 mg) by mouth every evening        folic acid 1 MG tablet    FOLVITE    100 tablet    Take 1 tablet (1 mg) by mouth daily    Liver replaced by transplant (H)       furosemide 20 MG tablet    LASIX    46 tablet    Take 0.5 tablets (10 mg) by mouth daily    CKD (chronic kidney disease) stage 3, GFR 30-59 ml/min, Liver replaced by transplant (H)       hydrALAZINE 25 MG tablet    APRESOLINE    270 tablet    Take 0.5 tablets (12.5 mg) by mouth 3 times daily as needed , if systolic blood pressure is more than 140 mmHg.    HTN (hypertension)       levothyroxine 150 MCG tablet    SYNTHROID/LEVOTHROID    96 tablet    Take one tablet daily 6 days a week and one and a half tablets one day a week.    Postablative hypothyroidism       magnesium 250 MG tablet      Take 2 tablets by mouth daily At night.        meclizine 25 MG tablet    ANTIVERT    30 tablet    Take 1 tablet (25 mg) by mouth as needed    Dizziness       metoprolol succinate 50 MG 24 hr tablet    TOPROL-XL    90 tablet    Take 1 tablet (50 mg) by mouth daily    Paroxysmal atrial fibrillation (H)       nitroFURantoin (macrocrystal-monohydrate) 100 MG capsule    MACROBID    14 capsule    Take 1 capsule (100 mg) by mouth 2 times daily For one week at onset of UTI symptoms    Urinary  tract infection without hematuria, site unspecified       oxyCODONE-acetaminophen 5-325 MG per tablet    PERCOCET    10 tablet    Take 1 tablet by mouth every 6 hours as needed for pain maximum 4 tablet(s) per day    Abdominal pain       predniSONE 5 MG tablet    DELTASONE    90 tablet    Take 1 tablet (5 mg) by mouth daily    Thyroid cancer (H), Liver replaced by transplant (H)       PRESERVISION AREDS 2 Caps      Take 2 capsules by mouth daily    Dry eyes, bilateral       sirolimus 0.5 MG tablet    GENERIC EQUIVALENT    45 tablet    Take 1 tablet (0.5 mg) by mouth every other day    Liver replaced by transplant (H)       SUMAtriptan 50 MG tablet    IMITREX    30 tablet    Take 1 tablet (50 mg) by mouth at onset of headache for migraine repeat after 2 hours if needed.    Migraine without aura and without status migrainosus, not intractable       triamcinolone 0.1 % cream    KENALOG    80 g    Apply topically 2 times daily Apply to rash    Contact dermatitis and other eczema, due to unspecified cause       ursodiol 250 MG tablet    ACTIGALL    180 tablet    Take 1 tablet (250 mg) by mouth 2 times daily    Liver replaced by transplant (H)       voriconazole 200 MG tablet    VFEND    60 tablet    Take 1 tablet (200 mg) by mouth 2 times daily    Coccidioidal pneumonitis (H)

## 2018-05-02 NOTE — LETTER
"5/2/2018       RE: Luz Thompson  [address]   [facility] [address]     Dear Colleague,    Thank you for referring your patient, Luz Thompson, to the [facility] UROLOGY AND INST FOR PROSTATE AND UROLOGIC CANCERS at [facility]. Please see a copy of my visit note below.    CC: Recurrent UTIs    HPI:  Luz Thompson is a 68 year old female asked to be seen in consultation for the above. She has a history of liver transplant in 2002 and had issues with recurrent UTIs for several years following her transplant. She was last seen in our office in 2013 at which time she was not having any UTIs off of antibiotic prophylaxis and was discharged from clinic.     Since that time, she has had two episodes of UTI symptoms in the past two years, both during her winter stays in [place]. She did not have a urine culture with the first UTI but did have one with the second which was negative per pt report. She was treated with nitrofurantoin for both episodes with complete resolution of her symptoms. She is asymptomatic today. PVR is 0 in clinic. She has previously used estrogen cream but has not used this in several years.     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      Diverticulosis of sigmoid colon 12/21/2013     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     Received Rituxan during Summer of 2016     H/O esophageal varices      Hearing loss      Heart murmur 4/4/2012     History of DVT (deep vein thrombosis)      History of [place] fever      History of thyroid cancer 9/25/2012     " Hyperlipidemia 4/10/2012    Says that she does not have it anymore, not on meds     Hypertension goal BP (blood pressure) < 140/80 11/6/2013     Hypertriglyceridemia      Liver replaced by transplant (H) 10/17/2011    Dr. 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       Past Surgical History:   Procedure Laterality Date     APPENDECTOMY  1961     BIOPSY       CATARACT IOL, RT/LT      RE12/19/2013, LE12/10/2013 - Toric lenses     CHOLECYSTECTOMY  1991     COLONOSCOPY  3/10/2014    Procedure: COLONOSCOPY;;  Surgeon: Gentry Ramirez MD;  Location:  GI     ear drum repair       ENDOBRONCHIAL ULTRASOUND FLEXIBLE N/A 9/29/2017    Procedure: ENDOBRONCHIAL ULTRASOUND FLEXIBLE;  Flexible Bronchoscopy, Endobronchial Ultrasound, Transbronchial Needle Aspiration ;  Surgeon: Eden Clinton MD;  Location[facility]     ENDOSCOPIC RETROGRADE CHOLANGIOPANCREATOGRAM  9/19/2013    Procedure: ENDOSCOPIC RETROGRADE CHOLANGIOPANCREATOGRAM;  Endoscopic Retrograde Cholangiopancreatogram with single balloon enteroscopy, ballon sweep of bile duct;  Surgeon: Brett Membreno MD;  Location:  OR      KNEE SCOPE,MED/LAT MENISECTOMY  8/10/12    Right, partial medial menisectomy only     KNEE SURGERY  1966    R knee     PICC INSERTION  9/18/2013    4fr SL PASV PICC, 40cm (1cm external) in the R basilic vein w/ tip in the low SVC     PICC INSERTION  2/21/2014    5 fr DL BioFlo Navilyst PICC, 46 cm (3 cm external) in the L basilic vein w/ tip in the SVC RA junction.     THYROIDECTOMY  3/2010     TRANSPLANT LIVER RECIPIENT LIVING UNRELATED         Meds,  "Allergies, FHx and SHx reviewed per nurse's intake note.    ROS is negative on a 14 point scale except for positive and pertinent information mentioned in the HPI.    PEx:   Blood pressure 115/66, pulse 79, height 1.702 m (5' 7\"), weight 82.1 kg (181 lb), not currently breastfeeding.  5' 7\", Body mass index is 28.35 kg/(m^2)., 181 lbs 0 oz  Gen appearance:  Well groomed  HEENT:  EOMI, AT NC  Psych:  Normal Affect  Neuro:  A/O X 3  Skin:  Warm to touch  Resp:  No increased respiratory effort      RU: 0 on bladder scan by nursing.    UA: UA RESULTS:  Recent Labs   Lab Test  05/02/18   1008  08/31/17   2100   COLOR  Yellow  Light Yellow   APPEARANCE  Clear  Slightly Cloudy   URINEGLC  Neg  Negative   URINEBILI  Neg  Negative   URINEKETONE  Neg  Negative   SG  1.010  1.010   UBLD  Neg  Small*   URINEPH  6.0  8.0*   PROTEIN  30   30*   UROBILINOGEN  0.2   --    NITRITE  Neg  Negative   LEUKEST  Trace  Small*   RBCU   --   3*   WBCU   --   2       ASSESSMENT and PLAN:  This is a 68 year old female with 2 urinary tract infections in the past 2 years.  Different management options were discussed with the patient including observation, cystoscopy, prophylactic antibiotics, self start antibiotics, and estrogen cream.    -The patient has elected to proceed with estrogen cream and self start antibiotics given that she travels extensively. She will contact us if she develops more than 1-2 UTIs per year; otherwise she will follow up PRN.     Ricardo Jason MD  Urology Resident    Patient was seen, evaluated and plan was formulated in conjunction with me and I agree with the above.      Again, thank you for allowing me to participate in the care of your patient.      Sincerely,    Sandeep Waddell MD      "

## 2018-05-04 ENCOUNTER — OFFICE VISIT (OUTPATIENT)
Dept: PSYCHIATRY | Facility: CLINIC | Age: 68
End: 2018-05-04
Attending: CLINICAL NURSE SPECIALIST
Payer: MEDICARE

## 2018-05-04 VITALS
SYSTOLIC BLOOD PRESSURE: 150 MMHG | HEART RATE: 75 BPM | DIASTOLIC BLOOD PRESSURE: 80 MMHG | BODY MASS INDEX: 27.94 KG/M2 | WEIGHT: 178.4 LBS

## 2018-05-04 DIAGNOSIS — F43.21 ADJUSTMENT DISORDER WITH DEPRESSED MOOD: Primary | ICD-10-CM

## 2018-05-04 PROCEDURE — G0463 HOSPITAL OUTPT CLINIC VISIT: HCPCS | Mod: ZF

## 2018-05-04 ASSESSMENT — PAIN SCALES - GENERAL: PAINLEVEL: NO PAIN (0)
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
## 2018-05-04 NOTE — MR AVS SNAPSHOT
After Visit Summary   5/4/2018    Luz Thompsno    MRN: [id number]           Patient Information     Date Of Birth          1950        Visit Information        Provider Department      5/4/2018 10:45 AM Luzma Mercer APRN [facility]        Today's Diagnoses     Adjustment disorder with depressed mood    -  1       Follow-ups after your visit        Your next 10 appointments already scheduled     May 22, 2018  1:15 PM CDT   (Arrive by 1:00 PM)   Return Visit with Mavis Bermudez MD   [facility] ([facility])    [address] [address]   [phone]            May 25, 2018  2:00 PM CDT   Office Visit with Jennifer Barraza MD   [facility] ([facility])    [address] [facility] [id number] [phone]           Bring a current list of meds and any records pertaining to this visit. For Physicals, please bring immunization records and any forms needing to be filled out. Please arrive 10 minutes early to complete paperwork.            Jun 01, 2018 11:30 AM CDT   Return Visit with Randell Mejia MD   [facility] ([facility])    [address] [place] [id number] [phone]            Jun 01, 2018  1:30 PM CDT   MA SCREENING BILATERAL W/ DAVIDA with MGMA1, MG MA TECH   [facility] ([facility])    [address] [place] [id number] [phone]           Three-dimensional (3D) mammograms are available at [facility] in [facility], [place], [place], [facility], [place], [place], and [place]. [facility] locations include [place] and [facility] Center in [place]. Benefits of 3D mammograms include: - Improved rate of cancer detection - Decreases your chance of having to go back for more tests, which means fewer: -  "\"False-positive\" results (This means that there is an abnormal area but it isn't cancer.) - Invasive testing procedures, such as a biopsy or surgery - Can provide clearer images of the breast if you have dense breast tissue. 3D mammography is an optional exam that anyone can have with a 2D mammogram. It doesn't replace or take the place of a 2D mammogram. 2D mammograms remain an effective screening test for all women.  Not all insurance companies cover the cost of a 3D mammogram. Check with your insurance.            Jun 20, 2018 12:00 PM CDT   CT CHEST W/O CONTRAST with UCCT2   [facility] ([facility])    [address]  1st Floor  [facility] [id number] [phone]           Please bring any scans or X-rays taken at other hospitals, if similar tests were done. Also bring a list of your medicines, including vitamins, minerals and over-the-counter drugs. It is safest to leave personal items at home.  Be sure to tell your doctor:   If you have any allergies.   If there s any chance you are pregnant.   If you are breastfeeding.  You do not need to do anything special to prepare for this exam.  Please wear loose clothing, such as a sweat suit or jogging clothes. Avoid snaps, zippers and other metal. We may ask you to undress and put on a hospital gown.            Jun 20, 2018  1:00 PM CDT   (Arrive by 12:45 PM)   Return Visit with Preet Villegas MD   [facility] ([facility])    [address]  [address]  [facility] [id number] [phone]            Jun 27, 2018 12:30 PM CDT   RETURN GENERAL with Dora Tovar MD   [facility] ([facility])    Mauricio Ramirez [address]  [address] [address]  [facility] [address] [phone]            Jun 27, 2018  1:45 PM CDT   RETURN RETINA with Meri Frsot MD   [facility] ([facility])    Mauricio Ramirez " [address]  9th Fl Clin 9a  [facility] [address]   [phone]            Jun 29, 2018 11:30 AM CDT   (Arrive by 11:15 AM)   Return Visit with Britt Oswald NP   [facility] ([facility] [place])    [address] [facility]  [address]  [facility] [address]   [phone]              Who to contact     Please call your clinic at [phone] to:    Ask questions about your health    Make or cancel appointments    Discuss your medicines    Learn about your test results    Speak to your doctor            Additional Information About Your Visit        [employer] Information     [employer] gives you secure access to your electronic health record. If you see a primary care provider, you can also send messages to your care team and make appointments. If you have questions, please call your primary care clinic.  If you do not have a primary care provider, please call [phone] and they will assist you.      [employer] is an electronic gateway that provides easy, online access to your medical records. With [employer], you can request a clinic appointment, read your test results, renew a prescription or communicate with your care team.     To access your existing account, please contact your [facility] or call [phone] for assistance.        Care EveryWhere ID     This is your Care EveryWhere ID. This could be used by other organizations to access your [place] medical records  [id number]        Your Vitals Were     Pulse BMI (Body Mass Index)                75 27.94 kg/m2           Blood Pressure from Last 3 Encounters:   05/19/18 110/56   05/15/18 167/74   05/04/18 150/80    Weight from Last 3 Encounters:   05/18/18 80.7 kg (177 lb 14.4 oz)   05/15/18 80.6 kg (177 lb 12.8 oz)   05/04/18 80.9 kg (178 lb 6.4 oz)              Today, you had the following     No orders found for display         Today's Medication Changes          These changes  are accurate as of 5/4/18 11:59 PM.  If you have any questions, ask your nurse or doctor.               Start taking these medicines.        Dose/Directions    sertraline 50 MG tablet   Commonly known as:  ZOLOFT   Used for:  Adjustment disorder with depressed mood   Started by:  Luzma Mercer APRN CNS        Dose:  50 mg   Take 1 tablet (50 mg) by mouth daily   Quantity:  30 tablet   Refills:  0         Stop taking these medicines if you haven't already. Please contact your care team if you have questions.     buPROPion 100 MG 12 hr tablet   Commonly known as:  WELLBUTRIN SR   Stopped by:  Luzma Mercer APRN CNS                Where to get your medicines      These medications were sent to [id number] - [place], [place] - [address] [address]  [address] [facility] [address]     Phone:  [phone]     sertraline 50 MG tablet                Primary Care Provider Office Phone # Fax #    Jennifer Amparo Barraza [phone] [address]  [facility] [address]        Equal Access to Services     [facility]: Hadii aad ku hadasho Soomaali, waaxda luqadaha, qaybta kaalmada adeegyada, waxay kayla lu . So [facility] [phone].    ATENCIÓN: Si habla español, tiene a jason disposición servicios gratuitos de asistencia lingüística. [facility] [phone].    We comply with applicable federal civil rights laws and [place] laws. We do not discriminate on the basis of race, color, national origin, age, disability, sex, sexual orientation, or gender identity.            Thank you!     Thank you for choosing [facility]  for your care. Our goal is always to provide you with excellent care. Hearing back from our patients is one way we can continue to improve our services. Please take a few minutes to complete the written survey that you may receive in the mail after your visit with us. Thank you!             Your Updated Medication List - Protect  others around you: Learn how to safely use, store and throw away your medicines at www.disposemymeds.org.          This list is accurate as of 5/4/18 11:59 PM.  Always use your most recent med list.                   Brand Name Dispense Instructions for use Diagnosis    acetaminophen 500 MG Caps      Take 1,000 mg by mouth nightly as needed Take 500-1,000 mg by mouth every 6 hours if needed.        ARTIFICIAL TEARS OP      Apply 1 drop to eye as needed        BENEFIBER Powd      2 teaspoons daily        calcitRIOL 0.5 MCG capsule    ROCALTROL    90 capsule    Take 1 capsule (0.5 mcg) by mouth daily        clotrimazole 1 % cream    LOTRIMIN     Apply topically 2 times daily as needed Reported on 4/25/2017        ELIQUIS 2.5 MG tablet   Generic drug:  apixaban ANTICOAGULANT      Take 2.5 mg by mouth 2 times daily        estradiol 0.1 MG/GM cream    ESTRACE VAGINAL    42.5 g    Place 2 g vaginally twice a week    Atrophic vaginitis       ezetimibe 10 MG tablet    ZETIA    30 tablet    Take 10 mg by mouth every evening        folic acid 1 MG tablet    FOLVITE    100 tablet    Take 1 tablet (1 mg) by mouth daily    Liver replaced by transplant (H)       furosemide 20 MG tablet    LASIX    46 tablet    Take 0.5 tablets (10 mg) by mouth daily    CKD (chronic kidney disease) stage 3, GFR 30-59 ml/min, Liver replaced by transplant (H)       hydrALAZINE 25 MG tablet    APRESOLINE    270 tablet    Take 0.5 tablets (12.5 mg) by mouth 3 times daily as needed , if systolic blood pressure is more than 140 mmHg.    HTN (hypertension)       meclizine 25 MG tablet    ANTIVERT    30 tablet    Take 1 tablet (25 mg) by mouth as needed    Dizziness       metoprolol succinate 50 MG 24 hr tablet    TOPROL-XL    90 tablet    Take 1 tablet (50 mg) by mouth daily    Paroxysmal atrial fibrillation (H)       nitroFURantoin (macrocrystal-monohydrate) 100 MG capsule    MACROBID    14 capsule    Take 1 capsule (100 mg) by mouth 2 times daily For  one week at onset of UTI symptoms    Urinary tract infection without hematuria, site unspecified       predniSONE 5 MG tablet    DELTASONE    90 tablet    Take 1 tablet (5 mg) by mouth daily    Thyroid cancer (H), Liver replaced by transplant (H)       sertraline 50 MG tablet    ZOLOFT    30 tablet    Take 1 tablet (50 mg) by mouth daily    Adjustment disorder with depressed mood       SUMAtriptan 50 MG tablet    IMITREX    30 tablet    Take 1 tablet (50 mg) by mouth at onset of headache for migraine repeat after 2 hours if needed.    Migraine without aura and without status migrainosus, not intractable       ursodiol 250 MG tablet    ACTIGALL    180 tablet    Take 1 tablet (250 mg) by mouth 2 times daily    Liver replaced by transplant (H)

## 2018-05-14 ENCOUNTER — TELEPHONE (OUTPATIENT)
Dept: PSYCHIATRY | Facility: CLINIC | Age: 68
End: 2018-05-14

## 2018-05-14 DIAGNOSIS — Z94.4 LIVER REPLACED BY TRANSPLANT (H): ICD-10-CM

## 2018-05-14 DIAGNOSIS — Z94.4 LIVER REPLACED BY TRANSPLANT (H): Primary | ICD-10-CM

## 2018-05-14 LAB
ALBUMIN SERPL-MCNC: 3.1 G/DL (ref 3.4–5)
ALP SERPL-CCNC: 240 U/L (ref 40–150)
ALT SERPL W P-5'-P-CCNC: 43 U/L (ref 0–50)
ANION GAP SERPL CALCULATED.3IONS-SCNC: 6 MMOL/L (ref 3–14)
AST SERPL W P-5'-P-CCNC: 23 U/L (ref 0–45)
BILIRUB DIRECT SERPL-MCNC: <0.1 MG/DL (ref 0–0.2)
BILIRUB SERPL-MCNC: 0.2 MG/DL (ref 0.2–1.3)
BUN SERPL-MCNC: 23 MG/DL (ref 7–30)
CALCIUM SERPL-MCNC: 9 MG/DL (ref 8.5–10.1)
CHLORIDE SERPL-SCNC: 100 MMOL/L (ref 94–109)
CO2 SERPL-SCNC: 27 MMOL/L (ref 20–32)
CREAT SERPL-MCNC: 1.4 MG/DL (ref 0.52–1.04)
ERYTHROCYTE [DISTWIDTH] IN BLOOD BY AUTOMATED COUNT: 16.5 % (ref 10–15)
GFR SERPL CREATININE-BSD FRML MDRD: 37 ML/MIN/1.7M2
GLUCOSE SERPL-MCNC: 124 MG/DL (ref 70–99)
HCT VFR BLD AUTO: 36.1 % (ref 35–47)
HGB BLD-MCNC: 11.5 G/DL (ref 11.7–15.7)
MCH RBC QN AUTO: 28.3 PG (ref 26.5–33)
MCHC RBC AUTO-ENTMCNC: 31.9 G/DL (ref 31.5–36.5)
MCV RBC AUTO: 89 FL (ref 78–100)
PLATELET # BLD AUTO: 326 10E9/L (ref 150–450)
POTASSIUM SERPL-SCNC: 3.3 MMOL/L (ref 3.4–5.3)
PROT SERPL-MCNC: 7.2 G/DL (ref 6.8–8.8)
RBC # BLD AUTO: 4.06 10E12/L (ref 3.8–5.2)
SODIUM SERPL-SCNC: 133 MMOL/L (ref 133–144)
WBC # BLD AUTO: 8.6 10E9/L (ref 4–11)

## 2018-05-14 PROCEDURE — 80048 BASIC METABOLIC PNL TOTAL CA: CPT | Performed by: INTERNAL MEDICINE

## 2018-05-14 PROCEDURE — 80076 HEPATIC FUNCTION PANEL: CPT | Performed by: INTERNAL MEDICINE

## 2018-05-14 PROCEDURE — 85027 COMPLETE CBC AUTOMATED: CPT | Performed by: FAMILY MEDICINE

## 2018-05-14 PROCEDURE — 36415 COLL VENOUS BLD VENIPUNCTURE: CPT | Performed by: INTERNAL MEDICINE

## 2018-05-14 NOTE — TELEPHONE ENCOUNTER
----- Message from Mirela Leal sent at 5/14/2018  7:57 AM CDT -----  Regarding: return call requested  Contact: [phone]  Pt said her pharmacy called her and said she had a script from Luzma for Sertraline and she has no idea about it, would like a call back to know why she has a script for that at the pharmacy for her.

## 2018-05-14 NOTE — TELEPHONE ENCOUNTER
"Returned call to pt who states that the pharmacy had contacted her about the prescription before she had read Mongo[employer] message from SABA Blaek, APRN.  Patient states that she is still \"waffling\" about whether or not to start the sertraline although does plan to  the prescription later this week.  Pt concerned about \"relying on it too much\" and that she is already on numerous medications.  Pt asks if provider would be willing to call her sometime this week to discuss the medication further.  Pt can be reached at [phone].  Pt aware that provider is out of clinic on Monday's.  Forwarded to SABA Blake, APRN.  e  "

## 2018-05-15 ENCOUNTER — OFFICE VISIT (OUTPATIENT)
Dept: GASTROENTEROLOGY | Facility: CLINIC | Age: 68
End: 2018-05-15
Attending: INTERNAL MEDICINE
Payer: MEDICARE

## 2018-05-15 ENCOUNTER — TELEPHONE (OUTPATIENT)
Dept: PSYCHIATRY | Facility: CLINIC | Age: 68
End: 2018-05-15

## 2018-05-15 VITALS
HEART RATE: 70 BPM | BODY MASS INDEX: 26.95 KG/M2 | SYSTOLIC BLOOD PRESSURE: 167 MMHG | OXYGEN SATURATION: 100 % | WEIGHT: 177.8 LBS | HEIGHT: 68 IN | DIASTOLIC BLOOD PRESSURE: 74 MMHG | TEMPERATURE: 98.3 F

## 2018-05-15 DIAGNOSIS — Z94.4 LIVER REPLACED BY TRANSPLANT (H): Primary | ICD-10-CM

## 2018-05-15 DIAGNOSIS — F43.23 ADJUSTMENT DISORDER WITH MIXED ANXIETY AND DEPRESSED MOOD: Primary | ICD-10-CM

## 2018-05-15 PROCEDURE — G0463 HOSPITAL OUTPT CLINIC VISIT: HCPCS | Mod: ZF

## 2018-05-15 ASSESSMENT — PAIN SCALES - GENERAL: PAINLEVEL: NO PAIN (0)

## 2018-05-15 NOTE — MR AVS SNAPSHOT
"              After Visit Summary   5/15/2018    Luz Thompson    MRN: [id number]           Patient Information     Date Of Birth          1950        Visit Information        Provider Department      5/15/2018 11:00 AM Gentry Ramirez MD [facility]        Today's Diagnoses     Liver replaced by transplant (H)    -  1       Follow-ups after your visit        Follow-up notes from your care team     Return in about 1 year (around 5/15/2019).      Your next 10 appointments already scheduled     May 22, 2018  1:15 PM CDT   (Arrive by 1:00 PM)   Return Visit with Mavis Bermudez MD   [facility] ([facility] [place])    [address] [facility]  3rd Floor  [facility] [id number] [phone]            Jun 01, 2018 11:30 AM CDT   Return Visit with Randell Mejia MD   [facility] ([facility])    [address] [place] [id number] [phone]            Jun 01, 2018  1:30 PM CDT   MA SCREENING BILATERAL W/ DAVIDA with MGMA1, MG MA [facility] ([facility])    [address] [place] [address] [phone]           Three-dimensional (3D) mammograms are available at [place] locations in [facility], [place], [place], [facility], [place], [place], and [place]. [facility] locations include [place] and [facility] Center in [place]. Benefits of 3D mammograms include: - Improved rate of cancer detection - Decreases your chance of having to go back for more tests, which means fewer: - \"False-positive\" results (This means that there is an abnormal area but it isn't cancer.) - Invasive testing procedures, such as a biopsy or surgery - Can provide clearer images of the breast if you have dense breast tissue. 3D mammography is an optional exam that anyone can have with a 2D mammogram. It doesn't replace or take the place of a 2D mammogram. 2D " mammograms remain an effective screening test for all women.  Not all insurance companies cover the cost of a 3D mammogram. Check with your insurance.            Jun 20, 2018 12:00 PM CDT   CT CHEST W/O CONTRAST with UCCT2   [facility] [place] [facility] ([facility] [place])    [address]  1st Floor  [facility] [address] [phone]           Please bring any scans or X-rays taken at other hospitals, if similar tests were done. Also bring a list of your medicines, including vitamins, minerals and over-the-counter drugs. It is safest to leave personal items at home.  Be sure to tell your doctor:   If you have any allergies.   If there s any chance you are pregnant.   If you are breastfeeding.  You do not need to do anything special to prepare for this exam.  Please wear loose clothing, such as a sweat suit or jogging clothes. Avoid snaps, zippers and other metal. We may ask you to undress and put on a hospital gown.            Jun 20, 2018  1:00 PM CDT   (Arrive by 12:45 PM)   Return Visit with Preet Villegas MD   [facility] ([facility] [place])    [address]  [address]  [facility] [address]   [phone]            Jun 27, 2018 12:30 PM CDT   RETURN GENERAL with Dora Tovar MD   Eye Clinic ([facility])    [address] Clin 9a  [facility] [address]   [phone]            Jun 27, 2018  1:45 PM CDT   RETURN RETINA with Meri Frost MD   Eye Clinic ([facility])    [place] [address] Clin [address]  [facility] [address]   [phone]            Jun 29, 2018 11:30 AM CDT   (Arrive by 11:15 AM)   Return Visit with Britt Oswald NP   [facility] ([facility] [place])    [address]  [address]  [facility] [address]   [phone]            Jul 10, 2018 10:45 AM CDT   Adult  "Med Follow UP with LIZ Nesbitt CNS   Psychiatry Clinic ([facility])    [address] [address] [phone]              Who to contact     If you have questions or need follow up information about today's clinic visit or your schedule please contact [facility] HEPAT[facility] directly at [phone].  Normal or non-critical lab and imaging results will be communicated to you by [employer], letter or phone within 4 business days after the clinic has received the results. If you do not hear from us within 7 days, please contact the clinic through [employer] or phone. If you have a critical or abnormal lab result, we will notify you by phone as soon as possible.  Submit refill requests through [employer] or call your pharmacy and they will forward the refill request to us. Please allow 3 business days for your refill to be completed.          Additional Information About Your Visit        [employer] Information     [employer] gives you secure access to your electronic health record. If you see a primary care provider, you can also send messages to your care team and make appointments. If you have questions, please call your primary care clinic.  If you do not have a primary care provider, please call [phone] and they will assist you.        Care EveryWhere ID     This is your Care EveryWhere ID. This could be used by other organizations to access your [place] medical records  [id number]        Your Vitals Were     Pulse Temperature Height Pulse Oximetry BMI (Body Mass Index)       70 98.3  F (36.8  C) (Oral) 1.715 m (5' 7.5\") 100% 27.44 kg/m2        Blood Pressure from Last 3 Encounters:   05/18/18 149/74   05/15/18 167/74   05/02/18 115/66    Weight from Last 3 Encounters:   05/15/18 80.6 kg (177 lb 12.8 oz)   05/02/18 82.1 kg (181 lb)   04/18/18 82.1 kg (180 lb 14.4 oz)              Today, you had the following     No orders found for display "         Today's Medication Changes          These changes are accurate as of 5/15/18 11:59 PM.  If you have any questions, ask your nurse or doctor.               These medicines have changed or have updated prescriptions.        Dose/Directions    PRESERVISION AREDS 2 Caps   This may have changed:    - how much to take  - when to take this   Used for:  Dry eyes, bilateral        Dose:  2 capsule   Take 2 capsules by mouth daily   Refills:  0       triamcinolone 0.1 % cream   Commonly known as:  KENALOG   This may have changed:    - when to take this  - reasons to take this  - additional instructions   Used for:  Contact dermatitis and other eczema, due to unspecified cause        Apply topically 2 times daily Apply to rash   Quantity:  80 g   Refills:  3                Primary Care Provider Office Phone # Fax #    Jennifer Amparo Barraza [phone]       [address] [place]  [facility] [address]        Equal Access to Services     [facility] : Hadii brayan guo hadasho Soomaali, waaxda luqadaha, qaybta kaalmada adeegyada, etta garza hayaugustus lu . So [facility] [phone].    ATENCIÓN: Si habla español, tiene a jason disposición servicios gratuitos de asistencia lingüística. Jonas al [phone].    We comply with applicable federal civil rights laws and [place] laws. We do not discriminate on the basis of race, color, national origin, age, disability, sex, sexual orientation, or gender identity.            Thank you!     Thank you for choosing [facility]  for your care. Our goal is always to provide you with excellent care. Hearing back from our patients is one way we can continue to improve our services. Please take a few minutes to complete the written survey that you may receive in the mail after your visit with us. Thank you!             Your Updated Medication List - Protect others around you: Learn how to safely use, store and throw away your medicines at  www.disposemymeds.org.          This list is accurate as of 5/15/18 11:59 PM.  Always use your most recent med list.                   Brand Name Dispense Instructions for use Diagnosis    acetaminophen 500 MG Caps      Take 500 mg by mouth as needed Take 500-1,000 mg by mouth every 6 hours if needed.        ARTIFICIAL TEARS OP      Apply 1 drop to eye as needed        BENEFIBER Powd      2 teaspoons daily        calcitRIOL 0.5 MCG capsule    ROCALTROL    90 capsule    Take 1 capsule (0.5 mcg) by mouth daily        clotrimazole 1 % cream    LOTRIMIN     Apply topically 2 times daily as needed Reported on 4/25/2017        ELIQUIS 2.5 MG tablet   Generic drug:  apixaban ANTICOAGULANT      2 times daily        estradiol 0.1 MG/GM cream    ESTRACE VAGINAL    42.5 g    Place 2 g vaginally twice a week    Atrophic vaginitis       ezetimibe 10 MG tablet    ZETIA    30 tablet    Take 0.5 tablets (5 mg) by mouth every evening        folic acid 1 MG tablet    FOLVITE    100 tablet    Take 1 tablet (1 mg) by mouth daily    Liver replaced by transplant (H)       furosemide 20 MG tablet    LASIX    46 tablet    Take 0.5 tablets (10 mg) by mouth daily    CKD (chronic kidney disease) stage 3, GFR 30-59 ml/min, Liver replaced by transplant (H)       hydrALAZINE 25 MG tablet    APRESOLINE    270 tablet    Take 0.5 tablets (12.5 mg) by mouth 3 times daily as needed , if systolic blood pressure is more than 140 mmHg.    HTN (hypertension)       levothyroxine 150 MCG tablet    SYNTHROID/LEVOTHROID    96 tablet    Take one tablet daily 6 days a week and one and a half tablets one day a week.    Postablative hypothyroidism       meclizine 25 MG tablet    ANTIVERT    30 tablet    Take 1 tablet (25 mg) by mouth as needed    Dizziness       metoprolol succinate 50 MG 24 hr tablet    TOPROL-XL    90 tablet    Take 1 tablet (50 mg) by mouth daily    Paroxysmal atrial fibrillation (H)       nitroFURantoin (macrocrystal-monohydrate) 100 MG  capsule    MACROBID    14 capsule    Take 1 capsule (100 mg) by mouth 2 times daily For one week at onset of UTI symptoms    Urinary tract infection without hematuria, site unspecified       predniSONE 5 MG tablet    DELTASONE    90 tablet    Take 1 tablet (5 mg) by mouth daily    Thyroid cancer (H), Liver replaced by transplant (H)       PRESERVISION AREDS 2 Caps      Take 2 capsules by mouth daily    Dry eyes, bilateral       sertraline 50 MG tablet    ZOLOFT    30 tablet    Take 1 tablet (50 mg) by mouth daily    Adjustment disorder with depressed mood       sirolimus 0.5 MG tablet    GENERIC EQUIVALENT    45 tablet    Take 1 tablet (0.5 mg) by mouth every other day    Liver replaced by transplant (H)       SUMAtriptan 50 MG tablet    IMITREX    30 tablet    Take 1 tablet (50 mg) by mouth at onset of headache for migraine repeat after 2 hours if needed.    Migraine without aura and without status migrainosus, not intractable       triamcinolone 0.1 % cream    KENALOG    80 g    Apply topically 2 times daily Apply to rash    Contact dermatitis and other eczema, due to unspecified cause       ursodiol 250 MG tablet    ACTIGALL    180 tablet    Take 1 tablet (250 mg) by mouth 2 times daily    Liver replaced by transplant (H)

## 2018-05-15 NOTE — LETTER
5/15/2018      RE: Luz Thompson  [address]  [facility] [address]       I had the pleasure of seeing Lzu Thompson for followup in the Liver Transplantation Clinic at [facility] on 05/15/2018.  Ms. 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      She has had a difficult winter.  First of all, her  left her which certainly put her through some depression and she is recovering from that.  She also has had a lung nodule that has been worked up including having had a needle biopsy which showed just some granulomatous disease but was not definitive for anything.  She has seen a chest surgeon who recommended just following up on it in 3 months.      From a liver standpoint, she denies any abdominal pain, itching or skin rash.  She has mild to moderate degree of fatigue which she attributes to depression, which she is getting help for.  She denies any increased abdominal girth or lower extremity edema.  She denies any fevers or chills, cough or shortness of breath.  She denies any nausea or vomiting, diarrhea or constipation.  Her appetite is good, and her weight has been stable.  There have been no other new events since she was last seen.       Current Outpatient Prescriptions   Medication     acetaminophen 500 MG CAPS     calcitRIOL (ROCALTROL) 0.5 MCG capsule     clotrimazole (LOTRIMIN) 1 % cream     ELIQUIS 2.5 MG tablet     estradiol (ESTRACE VAGINAL) 0.1 MG/GM cream     ezetimibe (ZETIA) 10 MG tablet     folic acid (FOLVITE) 1 MG tablet     furosemide (LASIX) 20 MG tablet     hydrALAZINE (APRESOLINE) 25 MG tablet     Hypromellose (ARTIFICIAL TEARS OP)     levothyroxine (SYNTHROID/LEVOTHROID) 150 MCG tablet     meclizine (ANTIVERT) 25 MG tablet     metoprolol (TOPROL-XL) 50 MG 24 hr tablet     Multiple Vitamins-Minerals (PRESERVISION AREDS 2) CAPS     predniSONE (DELTASONE) 5 MG tablet     sirolimus (GENERIC  EQUIVALENT) 0.5 MG tablet     SUMAtriptan (IMITREX) 50 MG tablet     triamcinolone (KENALOG) 0.1 % cream     ursodiol (ACTIGALL) 250 MG tablet     voriconazole (VFEND) 200 MG tablet     Wheat Dextrin (BENEFIBER) POWD     nitroFURantoin, macrocrystal-monohydrate, (MACROBID) 100 MG capsule     sertraline (ZOLOFT) 50 MG tablet     No current facility-administered medications for this visit.      B/P: 167/74, T: 98.3, P: 70, R: Data Unavailable    In general, she appears healthy.  HEENT exam shows no scleral icterus or temple muscle wasting.  Her chest is clear.  Her abdominal exam shows no increase in girth.  No masses or tenderness to palpation are present.  Her liver is 10 cm in span without left lobe enlargement.  No spleen tip is palpable, and extremity exam shows no edema.  Skin exam shows no suspicious lesions.  Neurologic exam is nonfocal.       Recent Results (from the past 168 hour(s))   CBC with platelets    Collection Time: 05/14/18 10:19 AM   Result Value Ref Range    WBC 8.6 4.0 - 11.0 10e9/L    RBC Count 4.06 3.8 - 5.2 10e12/L    Hemoglobin 11.5 (L) 11.7 - 15.7 g/dL    Hematocrit 36.1 35.0 - 47.0 %    MCV 89 78 - 100 fl    MCH 28.3 26.5 - 33.0 pg    MCHC 31.9 31.5 - 36.5 g/dL    RDW 16.5 (H) 10.0 - 15.0 %    Platelet Count 326 150 - 450 10e9/L   Basic metabolic panel    Collection Time: 05/14/18 10:19 AM   Result Value Ref Range    Sodium 133 133 - 144 mmol/L    Potassium 3.3 (L) 3.4 - 5.3 mmol/L    Chloride 100 94 - 109 mmol/L    Carbon Dioxide 27 20 - 32 mmol/L    Anion Gap 6 3 - 14 mmol/L    Glucose 124 (H) 70 - 99 mg/dL    Urea Nitrogen 23 7 - 30 mg/dL    Creatinine 1.40 (H) 0.52 - 1.04 mg/dL    GFR Estimate 37 (L) >60 mL/min/1.7m2    GFR Estimate If Black 45 (L) >60 mL/min/1.7m2    Calcium 9.0 8.5 - 10.1 mg/dL   Hepatic panel    Collection Time: 05/14/18 10:19 AM   Result Value Ref Range    Bilirubin Direct <0.1 0.0 - 0.2 mg/dL    Bilirubin Total 0.2 0.2 - 1.3 mg/dL    Albumin 3.1 (L) 3.4 - 5.0 g/dL     Protein Total 7.2 6.8 - 8.8 g/dL    Alkaline Phosphatase 240 (H) 40 - 150 U/L    ALT 43 0 - 50 U/L    AST 23 0 - 45 U/L      My impression is that Ms. Thompson is doing well now 16 years status post liver transplantation, at least from a medical standpoint.  Her liver tests show persistent elevation of alkaline phosphatase, and she has biopsy-proven recurrence of PBC which has been very mild.  She does have some chronic kidney disease, and her kidney function is stable as well.  I will not be making any change to her medical regimen.  I will see her back in the clinic again in 1 year.      I does seem as though she is getting good help psychologically with the life trauma that she experienced.      Thank you very much for allowing me to participate in the care of this patient.  If you have any questions regarding my recommendations, please do not hesitate to contact me.       Gentry Ramirez MD      Professor of Medicine  [employer]      Executive Medical Director, Solid Organ Transplant Program  [place]    Gentry Ramirez MD

## 2018-05-15 NOTE — TELEPHONE ENCOUNTER
Call to patient to discuss medication recommendations. Left message I will try to reach her tomorrow between 12:15 and 1:15 or around 6 pm.  Luzma WALTER, APRN

## 2018-05-16 ENCOUNTER — TELEPHONE (OUTPATIENT)
Dept: INFECTIOUS DISEASES | Facility: CLINIC | Age: 68
End: 2018-05-16

## 2018-05-16 DIAGNOSIS — B38.2 COCCIDIOIDAL PNEUMONITIS (H): ICD-10-CM

## 2018-05-16 NOTE — TELEPHONE ENCOUNTER
Health Call Center    Phone Message    May a detailed message be left on voicemail: yes    Reason for Call: Other: Patient set up an appointment for a follow up on 7/25. Patient's Rx: voriconazole (VFEND) 200 MG tablet is going to be running out at the end of the month and it will be her last refill. Patient wants to know if she can still get her medications since her appointment is going out to July.     Action Taken: Message routed to:  [facility] ([facility]): Infectious Disease

## 2018-05-16 NOTE — TELEPHONE ENCOUNTER
Call to patient. Reports still waffling about starting sertraline. Has had a couple bad days which made her think she should but also the circumstances. Discussed effect of Wellbutrin and also when she had taken medication prior to transplant. Since there was positive effect with those, recommended starting Sertraline now since the stress she has now will continuing and that unset of Sertraline will be 4 to 6 weeks.   Will be traveling Saturday through Wednesday so will wait until May 24th to start Sertraline. Schedule followup for 7/10/18 but will contact me by My Chart is any side effects or concerns before then.  Luzma Mercer CNS, APRN

## 2018-05-17 RX ORDER — VORICONAZOLE 200 MG/1
200 TABLET, FILM COATED ORAL 2 TIMES DAILY
Qty: 60 TABLET | Refills: 2 | Status: SHIPPED | OUTPATIENT
Start: 2018-05-17 | End: 2018-09-24

## 2018-05-17 NOTE — TELEPHONE ENCOUNTER
Pt called back and would like script sent to [facility] pharmacy in [place]. Script e-scribed.  Gayatri Santiago RN

## 2018-05-17 NOTE — TELEPHONE ENCOUNTER
Per message from Dr. Montero, OK to refill voriconazole to last until her appt with her in July. LVM for pt to find out which pharmacy she'd like it sent it to.  Gayatri Santiago RN

## 2018-05-18 ENCOUNTER — APPOINTMENT (OUTPATIENT)
Dept: CARDIOLOGY | Facility: CLINIC | Age: 68
End: 2018-05-18
Attending: PHYSICIAN ASSISTANT
Payer: MEDICARE

## 2018-05-18 ENCOUNTER — APPOINTMENT (OUTPATIENT)
Dept: MRI IMAGING | Facility: CLINIC | Age: 68
End: 2018-05-18
Attending: EMERGENCY MEDICINE
Payer: MEDICARE

## 2018-05-18 ENCOUNTER — APPOINTMENT (OUTPATIENT)
Dept: CT IMAGING | Facility: CLINIC | Age: 68
End: 2018-05-18
Attending: EMERGENCY MEDICINE
Payer: MEDICARE

## 2018-05-18 ENCOUNTER — HOSPITAL ENCOUNTER (OUTPATIENT)
Facility: CLINIC | Age: 68
Setting detail: OBSERVATION
Discharge: HOME | End: 2018-05-19
Attending: EMERGENCY MEDICINE | Admitting: INTERNAL MEDICINE
Payer: MEDICARE

## 2018-05-18 ENCOUNTER — APPOINTMENT (OUTPATIENT)
Dept: ULTRASOUND IMAGING | Facility: CLINIC | Age: 68
End: 2018-05-18
Attending: PHYSICIAN ASSISTANT
Payer: MEDICARE

## 2018-05-18 DIAGNOSIS — E78.5 HYPERLIPIDEMIA LDL GOAL <100: ICD-10-CM

## 2018-05-18 DIAGNOSIS — G45.8 OTHER SPECIFIED TRANSIENT CEREBRAL ISCHEMIAS: ICD-10-CM

## 2018-05-18 DIAGNOSIS — G44.219 EPISODIC TENSION-TYPE HEADACHE, NOT INTRACTABLE: ICD-10-CM

## 2018-05-18 DIAGNOSIS — R20.2 PARESTHESIA: ICD-10-CM

## 2018-05-18 DIAGNOSIS — I10 BENIGN ESSENTIAL HYPERTENSION: ICD-10-CM

## 2018-05-18 PROBLEM — R51.9 HEADACHE: Status: ACTIVE | Noted: 2018-05-18

## 2018-05-18 LAB
ANION GAP SERPL CALCULATED.3IONS-SCNC: 10 MMOL/L (ref 3–14)
APTT PPP: 33 SEC (ref 22–37)
BASOPHILS # BLD AUTO: 0 10E9/L (ref 0–0.2)
BASOPHILS NFR BLD AUTO: 0.2 %
BUN SERPL-MCNC: 20 MG/DL (ref 7–30)
CALCIUM SERPL-MCNC: 8.6 MG/DL (ref 8.5–10.1)
CHLORIDE SERPL-SCNC: 100 MMOL/L (ref 94–109)
CHOLEST SERPL-MCNC: 265 MG/DL
CO2 SERPL-SCNC: 27 MMOL/L (ref 20–32)
CREAT SERPL-MCNC: 1.22 MG/DL (ref 0.52–1.04)
DIFFERENTIAL METHOD BLD: ABNORMAL
EOSINOPHIL # BLD AUTO: 0.1 10E9/L (ref 0–0.7)
EOSINOPHIL NFR BLD AUTO: 0.9 %
ERYTHROCYTE [DISTWIDTH] IN BLOOD BY AUTOMATED COUNT: 16.5 % (ref 10–15)
GFR SERPL CREATININE-BSD FRML MDRD: 44 ML/MIN/1.7M2
GLUCOSE BLDC GLUCOMTR-MCNC: 100 MG/DL (ref 70–99)
GLUCOSE BLDC GLUCOMTR-MCNC: 129 MG/DL (ref 70–99)
GLUCOSE BLDC GLUCOMTR-MCNC: 162 MG/DL (ref 70–99)
GLUCOSE SERPL-MCNC: 119 MG/DL (ref 70–99)
HBA1C MFR BLD: 6.8 % (ref 0–5.6)
HCT VFR BLD AUTO: 36 % (ref 35–47)
HDLC SERPL-MCNC: 49 MG/DL
HGB BLD-MCNC: 11.6 G/DL (ref 11.7–15.7)
IMM GRANULOCYTES # BLD: 0 10E9/L (ref 0–0.4)
IMM GRANULOCYTES NFR BLD: 0.4 %
INR PPP: 1.06 (ref 0.86–1.14)
INTERPRETATION ECG - MUSE: NORMAL
LDLC SERPL CALC-MCNC: ABNORMAL MG/DL
LDLC SERPL DIRECT ASSAY-MCNC: 150 MG/DL
LYMPHOCYTES # BLD AUTO: 1.9 10E9/L (ref 0.8–5.3)
LYMPHOCYTES NFR BLD AUTO: 24.2 %
MCH RBC QN AUTO: 27.8 PG (ref 26.5–33)
MCHC RBC AUTO-ENTMCNC: 32.2 G/DL (ref 31.5–36.5)
MCV RBC AUTO: 86 FL (ref 78–100)
MONOCYTES # BLD AUTO: 1 10E9/L (ref 0–1.3)
MONOCYTES NFR BLD AUTO: 12.3 %
NEUTROPHILS # BLD AUTO: 5 10E9/L (ref 1.6–8.3)
NEUTROPHILS NFR BLD AUTO: 62 %
NONHDLC SERPL-MCNC: 216 MG/DL
NRBC # BLD AUTO: 0 10*3/UL
NRBC BLD AUTO-RTO: 0 /100
PLATELET # BLD AUTO: 324 10E9/L (ref 150–450)
POTASSIUM SERPL-SCNC: 3.6 MMOL/L (ref 3.4–5.3)
RBC # BLD AUTO: 4.17 10E12/L (ref 3.8–5.2)
SODIUM SERPL-SCNC: 137 MMOL/L (ref 133–144)
TRIGL SERPL-MCNC: 557 MG/DL
TROPONIN I SERPL-MCNC: <0.015 UG/L (ref 0–0.04)
WBC # BLD AUTO: 8 10E9/L (ref 4–11)

## 2018-05-18 PROCEDURE — 85025 COMPLETE CBC W/AUTO DIFF WBC: CPT | Performed by: EMERGENCY MEDICINE

## 2018-05-18 PROCEDURE — 70450 CT HEAD/BRAIN W/O DYE: CPT

## 2018-05-18 PROCEDURE — 85610 PROTHROMBIN TIME: CPT | Performed by: EMERGENCY MEDICINE

## 2018-05-18 PROCEDURE — 93880 EXTRACRANIAL BILAT STUDY: CPT

## 2018-05-18 PROCEDURE — 85730 THROMBOPLASTIN TIME PARTIAL: CPT | Performed by: EMERGENCY MEDICINE

## 2018-05-18 PROCEDURE — 25000125 ZZHC RX 250: Performed by: PHYSICIAN ASSISTANT

## 2018-05-18 PROCEDURE — A9270 NON-COVERED ITEM OR SERVICE: HCPCS | Mod: GY | Performed by: EMERGENCY MEDICINE

## 2018-05-18 PROCEDURE — 99220 ZZC INITIAL OBSERVATION CARE,LEVL III: CPT | Performed by: PHYSICIAN ASSISTANT

## 2018-05-18 PROCEDURE — 80048 BASIC METABOLIC PNL TOTAL CA: CPT | Performed by: EMERGENCY MEDICINE

## 2018-05-18 PROCEDURE — 25000132 ZZH RX MED GY IP 250 OP 250 PS 637: Mod: GY | Performed by: PHYSICIAN ASSISTANT

## 2018-05-18 PROCEDURE — 40000264 ECHO COMPLETE BUBBLE

## 2018-05-18 PROCEDURE — 83036 HEMOGLOBIN GLYCOSYLATED A1C: CPT | Performed by: EMERGENCY MEDICINE

## 2018-05-18 PROCEDURE — 00000146 ZZHCL STATISTIC GLUCOSE BY METER IP

## 2018-05-18 PROCEDURE — 84484 ASSAY OF TROPONIN QUANT: CPT | Performed by: EMERGENCY MEDICINE

## 2018-05-18 PROCEDURE — A9270 NON-COVERED ITEM OR SERVICE: HCPCS | Mod: GY | Performed by: PHYSICIAN ASSISTANT

## 2018-05-18 PROCEDURE — 80061 LIPID PANEL: CPT | Performed by: EMERGENCY MEDICINE

## 2018-05-18 PROCEDURE — 93005 ELECTROCARDIOGRAM TRACING: CPT

## 2018-05-18 PROCEDURE — 83721 ASSAY OF BLOOD LIPOPROTEIN: CPT | Mod: XU | Performed by: EMERGENCY MEDICINE

## 2018-05-18 PROCEDURE — 25000132 ZZH RX MED GY IP 250 OP 250 PS 637: Mod: GY | Performed by: EMERGENCY MEDICINE

## 2018-05-18 PROCEDURE — 93306 TTE W/DOPPLER COMPLETE: CPT | Mod: 26 | Performed by: INTERNAL MEDICINE

## 2018-05-18 PROCEDURE — G0378 HOSPITAL OBSERVATION PER HR: HCPCS

## 2018-05-18 PROCEDURE — 70551 MRI BRAIN STEM W/O DYE: CPT

## 2018-05-18 PROCEDURE — 99285 EMERGENCY DEPT VISIT HI MDM: CPT | Mod: 25

## 2018-05-18 RX ORDER — TRIAMCINOLONE ACETONIDE 1 MG/G
CREAM TOPICAL 2 TIMES DAILY PRN
COMMUNITY
End: 2019-08-27

## 2018-05-18 RX ORDER — LEVOTHYROXINE SODIUM 150 UG/1
225 TABLET ORAL
COMMUNITY
End: 2019-05-15

## 2018-05-18 RX ORDER — NALOXONE HYDROCHLORIDE 0.4 MG/ML
.1-.4 INJECTION, SOLUTION INTRAMUSCULAR; INTRAVENOUS; SUBCUTANEOUS
Status: DISCONTINUED | OUTPATIENT
Start: 2018-05-18 | End: 2018-05-19 | Stop reason: HOSPADM

## 2018-05-18 RX ORDER — METOPROLOL SUCCINATE 50 MG/1
50 TABLET, EXTENDED RELEASE ORAL DAILY
Status: DISCONTINUED | OUTPATIENT
Start: 2018-05-18 | End: 2018-05-18

## 2018-05-18 RX ORDER — EZETIMIBE 10 MG/1
10 TABLET ORAL EVERY EVENING
Status: DISCONTINUED | OUTPATIENT
Start: 2018-05-18 | End: 2018-05-19 | Stop reason: HOSPADM

## 2018-05-18 RX ORDER — ACETAMINOPHEN 325 MG/1
975 TABLET ORAL ONCE
Status: COMPLETED | OUTPATIENT
Start: 2018-05-18 | End: 2018-05-18

## 2018-05-18 RX ORDER — FUROSEMIDE 20 MG/1
10 TABLET ORAL DAILY
Status: DISCONTINUED | OUTPATIENT
Start: 2018-05-18 | End: 2018-05-19 | Stop reason: HOSPADM

## 2018-05-18 RX ORDER — SUMATRIPTAN 50 MG/1
50 TABLET, FILM COATED ORAL
Status: DISCONTINUED | OUTPATIENT
Start: 2018-05-18 | End: 2018-05-19 | Stop reason: HOSPADM

## 2018-05-18 RX ORDER — LEVOTHYROXINE SODIUM 150 UG/1
TABLET ORAL
COMMUNITY
End: 2018-05-23

## 2018-05-18 RX ORDER — ONDANSETRON 2 MG/ML
4 INJECTION INTRAMUSCULAR; INTRAVENOUS EVERY 6 HOURS PRN
Status: DISCONTINUED | OUTPATIENT
Start: 2018-05-18 | End: 2018-05-19 | Stop reason: HOSPADM

## 2018-05-18 RX ORDER — SIROLIMUS 0.5 MG/1
0.5 TABLET, SUGAR COATED ORAL EVERY OTHER DAY
COMMUNITY
End: 2018-08-03

## 2018-05-18 RX ORDER — AMOXICILLIN 250 MG
2 CAPSULE ORAL 2 TIMES DAILY
Status: DISCONTINUED | OUTPATIENT
Start: 2018-05-18 | End: 2018-05-19 | Stop reason: HOSPADM

## 2018-05-18 RX ORDER — AMOXICILLIN 250 MG
1 CAPSULE ORAL 2 TIMES DAILY
Status: DISCONTINUED | OUTPATIENT
Start: 2018-05-18 | End: 2018-05-19 | Stop reason: HOSPADM

## 2018-05-18 RX ORDER — GADOBUTROL 604.72 MG/ML
7.5 INJECTION INTRAVENOUS ONCE
Status: DISCONTINUED | OUTPATIENT
Start: 2018-05-18 | End: 2018-05-18 | Stop reason: CLARIF

## 2018-05-18 RX ORDER — CALCITRIOL 0.25 UG/1
0.5 CAPSULE, LIQUID FILLED ORAL DAILY
Status: DISCONTINUED | OUTPATIENT
Start: 2018-05-18 | End: 2018-05-19 | Stop reason: HOSPADM

## 2018-05-18 RX ORDER — ONDANSETRON 4 MG/1
4 TABLET, ORALLY DISINTEGRATING ORAL EVERY 6 HOURS PRN
Status: DISCONTINUED | OUTPATIENT
Start: 2018-05-18 | End: 2018-05-19 | Stop reason: HOSPADM

## 2018-05-18 RX ORDER — ACYCLOVIR 200 MG/1
30 CAPSULE ORAL ONCE
Status: DISCONTINUED | OUTPATIENT
Start: 2018-05-18 | End: 2018-05-19 | Stop reason: HOSPADM

## 2018-05-18 RX ORDER — ACETAMINOPHEN 325 MG/1
1000 TABLET ORAL
Status: DISCONTINUED | OUTPATIENT
Start: 2018-05-18 | End: 2018-05-19 | Stop reason: HOSPADM

## 2018-05-18 RX ORDER — METOPROLOL SUCCINATE 50 MG/1
50 TABLET, EXTENDED RELEASE ORAL DAILY
Status: DISCONTINUED | OUTPATIENT
Start: 2018-05-18 | End: 2018-05-19 | Stop reason: HOSPADM

## 2018-05-18 RX ORDER — ACETAMINOPHEN 325 MG/1
650 TABLET ORAL EVERY 4 HOURS PRN
Status: DISCONTINUED | OUTPATIENT
Start: 2018-05-18 | End: 2018-05-19 | Stop reason: HOSPADM

## 2018-05-18 RX ORDER — ANTIOX #8/OM3/DHA/EPA/LUT/ZEAX 250-2.5 MG
1 CAPSULE ORAL 2 TIMES DAILY
Status: ON HOLD | COMMUNITY
End: 2019-08-31

## 2018-05-18 RX ORDER — PREDNISONE 5 MG/1
5 TABLET ORAL DAILY
Status: DISCONTINUED | OUTPATIENT
Start: 2018-05-18 | End: 2018-05-19 | Stop reason: HOSPADM

## 2018-05-18 RX ORDER — URSODIOL 250 MG/1
250 TABLET, FILM COATED ORAL
Status: DISCONTINUED | OUTPATIENT
Start: 2018-05-18 | End: 2018-05-19 | Stop reason: HOSPADM

## 2018-05-18 RX ORDER — LEVOTHYROXINE SODIUM 150 UG/1
150 TABLET ORAL DAILY
Status: DISCONTINUED | OUTPATIENT
Start: 2018-05-18 | End: 2018-05-19 | Stop reason: HOSPADM

## 2018-05-18 RX ORDER — VORICONAZOLE 200 MG/1
200 TABLET, FILM COATED ORAL 2 TIMES DAILY
Status: DISCONTINUED | OUTPATIENT
Start: 2018-05-18 | End: 2018-05-19 | Stop reason: HOSPADM

## 2018-05-18 RX ORDER — FOLIC ACID 1 MG/1
1 TABLET ORAL DAILY
Status: DISCONTINUED | OUTPATIENT
Start: 2018-05-18 | End: 2018-05-19 | Stop reason: HOSPADM

## 2018-05-18 RX ADMIN — APIXABAN 2.5 MG: 2.5 TABLET, FILM COATED ORAL at 20:13

## 2018-05-18 RX ADMIN — VORICONAZOLE 200 MG: 200 TABLET ORAL at 20:14

## 2018-05-18 RX ADMIN — HYDRALAZINE HYDROCHLORIDE 12.5 MG: 25 TABLET ORAL at 08:49

## 2018-05-18 RX ADMIN — CALCITRIOL 0.5 MCG: 0.25 CAPSULE, LIQUID FILLED ORAL at 17:17

## 2018-05-18 RX ADMIN — URSODIOL 250 MG: 250 TABLET ORAL at 16:27

## 2018-05-18 RX ADMIN — LEVOTHYROXINE SODIUM 150 MCG: 150 TABLET ORAL at 16:28

## 2018-05-18 RX ADMIN — PREDNISONE 5 MG: 5 TABLET ORAL at 16:28

## 2018-05-18 RX ADMIN — FOLIC ACID 1 MG: 1 TABLET ORAL at 16:28

## 2018-05-18 RX ADMIN — ACETAMINOPHEN 975 MG: 325 TABLET ORAL at 07:45

## 2018-05-18 RX ADMIN — METOPROLOL SUCCINATE 50 MG: 50 TABLET, EXTENDED RELEASE ORAL at 08:49

## 2018-05-18 RX ADMIN — ACETAMINOPHEN 650 MG: 325 TABLET ORAL at 16:37

## 2018-05-18 RX ADMIN — EZETIMIBE 10 MG: 10 TABLET ORAL at 20:13

## 2018-05-18 ASSESSMENT — ENCOUNTER SYMPTOMS
VOMITING: 0
HEADACHES: 1
NUMBNESS: 1
SHORTNESS OF BREATH: 0
NAUSEA: 1

## 2018-05-18 NOTE — H&P
[facility]  History and Physical Exam     Luz Thompson MRN# [id number]   YOB: 1950 Age: 68 year old      Date of Admission:  5/18/2018    Primary care provider: Jennifer Barraza          Assessment   Luz Thomspon is a 68 year old female with a complicated PMH including PBS s/p liver transplant in 2002 (on prednisone, Rapamune, Ursidol), hx of CVA 2001, pAfib on Eliquis, hx of DVT, CKD, HTN,  who presented to the Emergency Room with complaints of left sided headache since last night with associated left facial and kayla-orbital paraesthesia. Work up in the ED reveals: negative CT of the head and negative MRI of the brain. Labs are otherwise unremarkable. Pt has improved sxs. Patient will be registered to Observation for further work-up and evaluation.     1. Left sided HA with left sided facial and kayla-oral paraesthesia--sxs sounds more c/w tension HA and likely component of anxiety that is contributing to her sxs and less likely TIA. She is under extreme stress (getting a divorce and last night her son-in-law told her that she had to move out of his house so pt had been crying all night) and this is likely the cause. Currently her sxs are completely resolved. She has no focal deficits. Her CT head and MRI arcadio imaging are negative. She is already maximized on statin, BP control and anti-coag w/Eliquis.  With that said, she has not had any recent carotid US, nor bubble study. I will admit her overnight for tele, neuro-checks, carotid us, ECHO w bubble.   -I expect she will be discharged tomorrow.     2. PAfib: currently in sinus. Cont Toprol and Eliquis  3. HLP: resume statin  4. HTN: cont Toprol. She's on PRN hydralazine TID. Previously was on it BID scheduled but had episode of hypotension, so now takes it TID PRN. Currently stable. Will cont to monitor.   5. Hypothyroid: resume synthroid  6. S/p liver transplant: stable. Cont Prednisone, Rapamune, ursodiol.   7.  Social stressors: getting a divorce, concerned about living situation. Pt states for the time being she is able to temporarily stay with dtr for a few more months  7. DVT prophylaxis: scds, on Eliquis  8. [place] to OBS              Chief Complaint:   Possible TIA         History of Present Illness:   Luz Thompson is a 68-year-old female with a past medical history significant for paroxysmal atrial fibrillation on Eliquis, primary biliary sclerosis status post liver transplant 2001 on immunosuppressants, hypertension hyperlipidemia, hypothyroidism who presented to the emergency room with complaint of left-sided headache and left facial and perioral paresthesias since yesterday evening.  History is obtained by speaking with the ER physician patient interview and chart review.   Patient states that yesterday she described a left-sided kayla-orbital headache that is pulsating and sharp.  She had no other symptoms such as light sensitivity, vision changes or nausea or vomiting.  She does have a history of migraine headaches but states that this feels completely different (normally has aura, bandlike sensation, light and sound sensitivity). The headaches was not too bad and she went to sleep but she woke up at 3 AM was worsening headache.  By this morning she states that the headache was worsening in intensity she started having left-sided facial numbness and tingling sensation as well as kayla-oral paresthesia. She was concerned that this was similar to her previous history of stroke and therefore came into the emergency room for further evaluation.  Of note the patient is under an extreme amount of stress.  She recently was kicked out of her house by her  and he is filing divorce.  She had been staying with her daughter but yesterday evening her son-in-law had told her that she needed to move out.  She was concerned that she might be homeless and therefore had been crying all night.   Workup in the emergency  "room reveals fairly unremarkable labs, negative CT of the head, normal MRI of the brain.  Her symptoms have completely resolved she is complaining of a mild left-sided head pressure but otherwise is asymptomatic.  She has no focal deficits.  Due to her complicated history, as well as modest elevation of her blood pressure she was recommended that for admission overnight for observation.          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      Diverticulosis of sigmoid colon 12/21/2013     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     Received Rituxan during Summer of 2016     H/O esophageal varices      Hearing loss      Heart murmur 4/4/2012     History of DVT (deep vein thrombosis)      History of [facility] fever      History of thyroid cancer 9/25/2012     Hyperlipidemia 4/10/2012    Says that she does not have it anymore, not on meds     Hypertension goal BP (blood pressure) < 140/80 11/6/2013     Hypertriglyceridemia      Liver replaced by transplant (H) 10/17/2011    Dr. 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               Past Surgical History:     Past Surgical History:   Procedure Laterality Date     APPENDECTOMY  " "1961     BIOPSY       CATARACT IOL, RT/LT      RE12/19/2013, LE12/10/2013 - Toric lenses     CHOLECYSTECTOMY  1991     COLONOSCOPY  3/10/2014    Procedure: COLONOSCOPY;;  Surgeon: Gentry Ramirez MD;  Location: [facility]     ear drum repair       ENDOBRONCHIAL ULTRASOUND FLEXIBLE N/A 9/29/2017    Procedure: ENDOBRONCHIAL ULTRASOUND FLEXIBLE;  Flexible Bronchoscopy, Endobronchial Ultrasound, Transbronchial Needle Aspiration ;  Surgeon: Eden Clinton MD;  Location: [facility] OR     ENDOSCOPIC RETROGRADE CHOLANGIOPANCREATOGRAM  9/19/2013    Procedure: ENDOSCOPIC RETROGRADE CHOLANGIOPANCREATOGRAM;  Endoscopic Retrograde Cholangiopancreatogram with single balloon enteroscopy, ballon sweep of bile duct;  Surgeon: Brett Membreno MD;  Location: [facility] OR      KNEE SCOPE,MED/LAT MENISECTOMY  8/10/12    Right, partial medial menisectomy only     KNEE SURGERY  1966    R knee     PICC INSERTION  9/18/2013    4fr SL PASV PICC, 40cm (1cm external) in the R basilic vein w/ tip in the low SVC     PICC INSERTION  2/21/2014    5 fr DL BioFlo Navilyst PICC, 46 cm (3 cm external) in the L basilic vein w/ tip in the SVC RA junction.     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 She and her  travel around the [place] in an RV. [place] usually are in the [place] the course of the [place]. [place] h[place], [place], corn and soybean crops.               Family History:     Family History   Problem Relation Age of Onset     Hypertension Mother      Endometrial Cancer Mother      Hyperlipidemia Mother      Prostate Cancer Father      Macular Degeneration Father      Cancer - colorectal Maternal Grandmother      in her 80's, has surgery and removal of part of kidney,  at age 98     HEART DISEASE Maternal Grandfather       at 98     Glaucoma Maternal Grandfather      CEREBROVASCULAR DISEASE Paternal Grandmother      in her 80's     Hypertension Paternal Grandmother      HEART DISEASE Paternal Grandfather      MI     Alzheimer Disease Paternal Grandfather      Allergies Son      Neurologic Disorder Daughter      Migraines     Breast Cancer Other      Anesthesia Reaction No family hx of      Crohn Disease No family hx of      Ulcerative Colitis No family hx of               Allergies:      Allergies   Allergen Reactions     Fluconazole Hives and Itching     Ciprofloxacin Anxiety and Other (See Comments)     Irregular heart beat     Azithromycin Itching     Benadryl [Diphenhydramine Hcl]      Insomnia      Cellcept Diarrhea     Ciprofloxacin Other (See Comments)     Insomnia, mood lability     Codeine      Psych disturbance     Codeine      Corn Oil      Diphenhydramine Other (See Comments)     Doxycycline      Lansoprazole Diarrhea     Levaquin [Levofloxacin] Other (See Comments)     Headache, hyperactivity     Lisinopril Cough     Methotrexate      Sores     Methotrexate      Morphine Sulfate Itching     Mycophenolate Diarrhea     No Clinical Screening - See Comments      Simvastatin Muscle Pain (Myalgia)     severe     Cephalexin Rash      Fever and skin burning     Penicillin G Itching and Rash     Tolectin [Nsaids] Rash     Tramadol Rash               Medications:     Prior to Admission medications    Medication Sig Last Dose Taking? Auth Provider   calcitRIOL (ROCALTROL) 0.5 MCG capsule Take 1 capsule (0.5 mcg) by mouth daily 5/17/2018 at   Yes Rach Vasquez MD   ELIQUIS 2.5 MG tablet Take 2.5 mg by mouth 2 times daily  5/17/2018 at pm Yes Reported, Patient   ezetimibe (ZETIA) 10 MG tablet Take 10 mg by mouth every evening  5/17/2018 at pm Yes Yuridia Frost MD   folic acid (FOLVITE) 1 MG tablet Take 1 tablet (1 mg) by mouth daily 5/17/2018 at am Yes Gentry Ramirez MD   furosemide (LASIX) 20 MG tablet Take 0.5 tablets (10 mg) by mouth daily 5/17/2018 at am Yes Randell Reyna MD   hydrALAZINE (APRESOLINE) 25 MG tablet Take 0.5 tablets (12.5 mg) by mouth 3 times daily as needed , if systolic blood pressure is more than 140 mmHg. 5/17/2018 at pm Yes Randell Reyna MD   levothyroxine (SYNTHROID) 150 MCG tablet Take 150mcg by mouth daily 6 days a week & 225mcg on Mondays 5/17/2018 at am Yes Unknown, Entered By History   levothyroxine (SYNTHROID) 150 MCG tablet Take 225 mcg by mouth on Mondays 5/14/2018 at am Yes Unknown, Entered By History   metoprolol (TOPROL-XL) 50 MG 24 hr tablet Take 1 tablet (50 mg) by mouth daily 5/17/2018 at   Yes Graham Gilmore MD   Multiple Vitamins-Minerals (PRESERVISION AREDS 2) CAPS Take 1 capsule by mouth 2 times daily 5/17/2018 at pm Yes Unknown, Entered By History   predniSONE (DELTASONE) 5 MG tablet Take 1 tablet (5 mg) by mouth daily 5/17/2018 at   Yes Gentry Ramirez MD   RAPAMUNE (BRAND) 0.5 MG PO TABLET Take 0.5 mg by mouth every other day 5/16/2018 Yes Unknown, Entered By History   SUMAtriptan (IMITREX) 50 MG tablet Take 1 tablet (50 mg) by mouth at onset of headache for migraine repeat after 2 hours if needed. prn at   Yes Jennifer Barraza MD   triamcinolone (KENALOG) 0.1 %  "cream Apply topically 2 times daily as needed for irritation prn Yes Unknown, Entered By History   ursodiol (ACTIGALL) 250 MG tablet Take 1 tablet (250 mg) by mouth 2 times daily 5/17/2018 at pm Yes Gentry Ramirez MD   voriconazole (VFEND) 200 MG tablet Take 1 tablet (200 mg) by mouth 2 times daily 5/17/2018 at pm Yes Crystal Montero MD   Wheat Dextrin (BENEFIBER) POWD 2 teaspoons daily 5/17/2018 at am Yes Crystal Montero MD   acetaminophen 500 MG CAPS Take 1,000 mg by mouth nightly as needed Take 500-1,000 mg by mouth every 6 hours if needed.  5/16/2018 at hs  Reported, Patient   clotrimazole (LOTRIMIN) 1 % cream Apply topically 2 times daily as needed Reported on 4/25/2017 prn  Crystal Montero MD   estradiol (ESTRACE VAGINAL) 0.1 MG/GM cream Place 2 g vaginally twice a week 5/15/2018  Ricardo Jason MD   Hypromellose (ARTIFICIAL TEARS OP) Apply 1 drop to eye as needed prn  Reported, Patient   meclizine (ANTIVERT) 25 MG tablet Take 1 tablet (25 mg) by mouth as needed prn  Jennifer Barraza MD   nitroFURantoin, macrocrystal-monohydrate, (MACROBID) 100 MG capsule Take 1 capsule (100 mg) by mouth 2 times daily For one week at onset of UTI symptoms  Patient not taking: Reported on 5/15/2018 prn  Ricardo Jason MD   sertraline (ZOLOFT) 50 MG tablet Take 1 tablet (50 mg) by mouth daily  Patient not taking: Reported on 5/15/2018 Didn't start yet  Luzma Mercer, APRN CNS              Review of Systems:   A Comprehensive greater than 10 system review of systems was carried out.  Pertinent positives and negatives are noted above.  Otherwise negative for contributory information.            Physical Exam:   Blood pressure 150/72, temperature 98.6  F (37  C), temperature source Oral, resp. rate 16, height 1.715 m (5' 7.5\"), weight 80.7 kg (177 lb 14.4 oz), SpO2 99 %, not currently breastfeeding.    GENERAL: healthy, alert and no distress  SPEECH: Patient's speech is normal.  EYES: Eyes grossly " normal to inspection  HENT: ear canals- normal; TMs- normal; Nose- normal; Mouth- no ulcers, no lesions. Oral Mucosa-normal  NECK: no tenderness, no adenopathy, no asymmetry, no masses, no stiffness; thyroid- normal to palpation  RESP: lungs clear to auscultation - no rales, no rhonchi, no wheezes  CV: regular rates and rhythm and normal S1 S2, no S3 or S4  ABDOMEN: soft, no tenderness, no  hepatosplenomegaly, no masses, normal bowel sounds  MS: extremities- no gross deformities noted, no peripheral edema  SKIN: no suspicious lesions, no rashes  NEURO: Normal strength and tone, sensory exam grossly normal  GAIT: stable  Cranial nerves 2 through 12 grossly intact, Lid lag NONE and No facial droop, no lid lag, normal facial features  PSYCH: Alert and oriented times 3; coherent speech, normal rate and volume, able to articulate logical thoughts, able to abstract reason, no tangential thoughts, no hallucinations or delusions. Affect is normal.           Data:     EKG demonstrates:  normal axis, normal intervals, no acute ST/T changes c/w ischemia, no LVH by voltage criteria, unchanged from previous tracings.      Recent Labs  Lab 05/18/18  0731 05/14/18  1019   WBC 8.0 8.6   HGB 11.6* 11.5*   HCT 36.0 36.1   MCV 86 89    326       Recent Labs  Lab 05/18/18  0731 05/14/18  1019    133   POTASSIUM 3.6 3.3*   CHLORIDE 100 100   CO2 27 27   ANIONGAP 10 6   * 124*   BUN 20 23   CR 1.22* 1.40*   GFRESTIMATED 44* 37*   GFRESTBLACK 53* 45*   KEILY 8.6 9.0       Recent Labs  Lab 05/18/18  0731   INR 1.06       Recent Labs  Lab 05/18/18  0731   TROPI <0.015       Results for orders placed or performed during the hospital encounter of 05/18/18   CT Head w/o Contrast    Narrative    CT SCAN OF THE HEAD WITHOUT CONTRAST   5/18/2018 8:08 AM     HISTORY: Headache, on Eliquis, vague numbness.     TECHNIQUE:  Axial images of the head and coronal reformations without  IV contrast material. Radiation dose for this scan  was reduced using  automated exposure control, adjustment of the mA and/or kV according  to patient size, or iterative reconstruction technique.    COMPARISON: Brain MR 9/25/2012.    FINDINGS: There is no evidence of intracranial hemorrhage, mass, acute  infarct or anomaly. There is generalized atrophy of the brain. There  is low attenuation in the white matter of the cerebral hemispheres  consistent with sequelae of small vessel ischemic disease. Ventricular  size within normal limits without evidence of hydrocephalus.     The visualized portions of the sinuses and mastoids appear normal. The  bony calvarium and bones of the skull base appear intact.       Impression    IMPRESSION:     1. No evidence of acute intracranial hemorrhage, mass, or herniation.  2. There is generalized atrophy of the brain. White matter changes are  present in the cerebral hemispheres that are consistent with small  vessel ischemic disease in this age patient.    JACQUELINE SANTANA MD   MR Brain w/o Contrast    Narrative    MRI BRAIN WITHOUT CONTRAST  5/18/2018 9:24 AM    HISTORY: Rule out stroke.    TECHNIQUE: Multiplanar, multisequence MRI of the brain without  gadolinium IV contrast material.      COMPARISON: Head CT from earlier the same day.    FINDINGS: There is no evidence of hemorrhage, mass, acute infarct, or  anomaly. Moderate diffuse parenchymal volume loss. Moderate patchy  deep and subcortical white matter T2 hyperintensities including  lesions in the central alek which are nonspecific, but likely related  to chronic microvascular ischemic disease. Ventricular size within  normal limits without hydrocephalus.     The facial structures appear normal. The arteries at the base of the  brain and the dural venous sinuses appear patent.      Impression    IMPRESSION:    1. No evidence of acute infarct, mass, hemorrhage, or herniation.  2. Moderate diffuse parenchymal volume loss and white matter changes  likely due to chronic  microvascular ischemic disease.      JACQUELINE SANTANA MD     *Note: Due to a large number of results and/or encounters for the requested time period, some results have not been displayed. A complete set of results can be found in Results Review.       Liliana Russ PA-C    This patient was discussed with Dr. Serna who agrees with the current plans as outlined above.

## 2018-05-18 NOTE — IP AVS SNAPSHOT
[facility] Observation Department    [address]    [facility] [address]    Phone:  [phone]                                       After Visit Summary   5/18/2018    Luz Thompson    MRN: [id number]           After Visit Summary Signature Page     I have received my discharge instructions, and my questions have been answered. I have discussed any challenges I see with this plan with the nurse or doctor.    ..........................................................................................................................................  Patient/Patient Representative Signature      ..........................................................................................................................................  Patient Representative Print Name and Relationship to Patient    ..................................................               ................................................  Date                                            Time    ..........................................................................................................................................  Reviewed by Signature/Title    ...................................................              ..............................................  Date                                                            Time

## 2018-05-18 NOTE — PLAN OF CARE
Problem: Patient Care Overview  Goal: Plan of Care/Patient Progress Review  PRIMARY DIAGNOSIS: Headache, Numbness Left side of face on admission  OUTPATIENT/OBSERVATION GOALS TO BE MET BEFORE DISCHARGE:  1. ADLs back to baseline: Yes    2. Activity and level of assistance: Independent    3. Pain status: Headache, relief with tylenol     4. Return to near baseline physical activity: Yes     Discharge Planner Nurse   Safe discharge environment identified: Yes  Barriers to discharge: Yes       Entered by: Melody Soria 05/18/2018 5:38 PM     Patient alert and oriented.  Neuro check intact.  Denies further numbness and tingling in left side face and lip.  PRN tylenol given for headache with relief.  Patient requested removal of corn oil allergy, states this was done precautionary in the past to rule out other allergies, no further need for corn oil allergy notation, this was deleted per patient request.  Patient states at the time the allergy was found to be related to a transplant mediation, not corn oil.       Please review provider order for any additional goals.   Nurse to notify provider when observation goals have been met and patient is ready for discharge.

## 2018-05-18 NOTE — ED PROVIDER NOTES
"  History     Chief Complaint:  Headache    HPI   Luz Thompson is a 68 year old female, with history of hypertension, migraines, hyperlipidemia, stroke, paroxsymal AFIB, liver transplant recipient amongst other complicated past medical history as noted below, who is currently on Eliquis amongst other medications as indicated below, who presents via EMS alone to the emergency department for evaluation of a headache that began last night. Patient reports that she began to experience a left sided headache above the eye last evening, but was still able to go to bed, however woke up with the headache, which continued to worsen throughout the morning from a 3/10 to a 7/10 by 0600. The patient also reported that she began to experience what patient describes as facial numbness in her left cheek and across her lips that was notable around 0545. Of note, patient reports that she was crying at 0600 due to personal stress.     Here in the ED, the patient reported she became concerned as her previous stroke began with a headache as well, that felt similar to her current headache, prompting patient to call EMS. Patient also endorsed experiencing some nausea. Of note, patient does report that her headache with her stroke \"came and went, but this one stayed\". Patient denies any chest pain, shortness of breath or vomiting. Of note, patient has not taken her morning blood pressure medications.     Allergies:  Fluconazole  Ciprofloxacin  Azithromycin  Benadryl  Cellcept  Codeine  Diphenhydramine  Doxycycline  Lansoprazole  Levaquin   Lisinopril  Methotrexate  Morphine sulfate  Mycophenolate  Simvastatin  Cephalexin  Penicillin G  Tolectin  Tramadol    Medications:    Acetaminophen  Calcitriol  Lotrimin cream  Eliquis  Estradiol  Zetia  Folvite  Lasix  Hydralazine  Levothyroxine  Antivert  Metoprolol  Multiple vitamins-minerals  Macrobid  Deltasone  Zoloft  Sirolimus  Imitrex  Kenalog cream  Actigall  Vfend  Benefiber     Past " Medical History:    Anemia of other chronic disease  Anxiety  Bladder infection, chronic  CKD  Coccidioidomycosis  CVA  Diverticulosis of sigmoid colon  EBV viremia  Esophageal varices  Hearing loss  Heart murmur  DVT  [place] fever  Thyroid cancer  Hyperlipidemia  Hypertension  Hypertriglyceridemia  Liver replaced by transplant  Migraines  Nonsenile cataract  Osteoarthritis of right knee  Osteoporosis  Paroxysmal AFIB  Postablative hypothyroidism  Primary biliary cirrhosis  Sjogren's syndrome  Unspecified glaucoma  Vitamin D deficiency  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  Smoking Status: Former  Smokeless Tobacco: No  Alcohol Use: Yes   Marital Status:  Legally  [3]     Review of Systems   Respiratory: Negative for shortness of breath.    Cardiovascular: Negative for chest pain.   Gastrointestinal: Positive for nausea. Negative for vomiting.   Neurological: Positive for numbness and headaches (left sided above eye). Seizures: Left side - cheek and across lips.   All other systems reviewed and are negative.    Physical Exam   Vitals:  Patient Vitals for the past 24 hrs:   BP Temp Temp src Heart Rate Resp SpO2   05/18/18 0945 147/75 - - - - 99 %   05/18/18 0850 - - - - - 98 %   05/18/18 0845 139/68 - - - - 98 %   05/18/18 0830 134/81 - - - - 99 %   05/18/18 0753 - - - - - 100 %   05/18/18 0752 - - - - - 99 %   05/18/18 0745 149/74 - - 73 - -   05/18/18 0730 159/63 98.4  F (36.9  C) Oral 74 18 100 %      Physical  Exam  Physical Exam   General: Resting on the bed.  Ears, Nose, Throat:  External ears normal.  Nose normal.  No pharyngeal erythema, swelling or exudate.  Midline uvula.    Eyes:  Conjunctivae clear.  Pupils are equal, round, and reactive.   Neck: Normal range of motion.  Neck supple.   CV: Regular rate and rhythm.  No murmurs.      Respiratory: Effort normal and breath sounds normal.  No wheezing or crackles.   Gastrointestinal: Soft.  No distension. There is no tenderness.    Neuro: Alert. Moving all extremities appropriately.  Normal speech.  CN II-XII grossly intact, no pronator drift, normal finger-nose-finger.  Gross muscle strength intact of the proximal and distal bilateral upper and lower extremities.  Sensation intact to light touch in all 4 extremities.  2+ patellar reflexes.  Normal gait.    Skin: Skin is warm and dry.  No rash noted.   Psych: flat affect.      Emergency Department Course     ECG:  ECG taken at 0741, ECG read at 0750 by   Normal sinus rhythm  Normal ECG  Rate 70 bpm. IA interval 186. QRS duration 82. QT/QTc 424/457. P-R-T axes 55 8 35.     Imaging:  Radiology findings were communicated with the patient who voiced understanding of the findings.  CT Head w/o Contrast:  IMPRESSION:     1. No evidence of acute intracranial hemorrhage, mass, or herniation.  2. There is generalized atrophy of the brain. White matter changes are  present in the cerebral hemispheres that are consistent with small  vessel ischemic disease in this age patient.  Reading per radiology.     MR Brain w/o & w/ Contrast:  IMPRESSION:    1. No evidence of acute infarct, mass, hemorrhage, or herniation.  2. Moderate diffuse parenchymal volume loss and white matter changes  likely due to chronic microvascular ischemic disease.  Reading per radiology.     Laboratory:  Laboratory findings were communicated with the patient who voiced understanding of the findings.  CBC: HGB 11.6 (L) o/w WNL (WBC 8.0, PLT  324)  BMP: Glucose 119 (H), Creatinine 1.22 (H), GFR Estimate 44 (L), o/w WNL  PTT: 33   INR: 1.06  Troponin (Collected 0731): <0.015   Glucose by Meter (Collected 0730): 129 (H)    Interventions:  0745 Tylenol 975 mg PO  0849 Metoprolol 50 mg PO  0849 Apresoline 12.5 mg PO     Emergency Department Course:  Nursing notes and vitals reviewed.  IV was inserted and blood was drawn for laboratory testing, results above.  The patient was sent for a CT Head w/o Contrast, MR Brain w/o & w/ Contrast while in the emergency department, results above.   EKG obtained in the ED, see results above.       7:21 AM: EMS arrival.  7:23 AM: Entered room. Obtained history from EMS.   7:25 AM: I performed an exam of the patient as documented above. Evaluated for possible stroke.   8:26 AM: Updated patient pertaining results.   10:23 AM: Updated patient pertaining imaging results. Discussed plan of care and patient is agreeable to admission.   11:37 AM: I spoke with TIFFANIE Tomas, on behalf of Dr. Montero of the Hospitalist service regarding patient's presentation, findings, and plan of care.     I discussed the treatment plan with the patient. They expressed understanding of this plan and consented to admission. I discussed the patient with TIFFANIE Tomas, on behalf of Dr. Montero, who will admit the patient to a monitored bed for further evaluation and treatment.     I personally reviewed the laboratory results with the Patient and answered all related questions prior to admission.    Impression & Plan      Medical Decision Making:  Luz Thompson is a 68 year old female with history of hypertension, hyperlipidemia, stroke, AFIB and on Eliquis, presenting with paresthesia and headache. Vital signs notable for mild hypertension, initial blood pressure greater than 140 with blood pressure in the 150's over 60's. Afebrile. Broad differential pursued including, but not limited to, stroke, mass, TIA, migraine, subarachnoid,  electrolyte/metabolic abnormality, etc. Overall, patient is well apparing and non-toxic. Her stroke score is negative. She only has positive paresthesia over the left side of her face. She also reports a sharp headache. No stroke code was called. Patient was taken in for an emergent head CT. Head CT shows no acute intracranial hemorrhage, mass or skull fracture. CBC shows no leukocytosis and mild ongoing anemia. BMP shows no acute electrolyte, metabolic or renal abnormality and improved kidney dysfunction with creatinine of 1.22. Coags are within normal limits. EKG shows sinus rhythm, no acute ST-T wave changes, troponin is negative. Overall, low suspicion for acute coronary syndrome.  Considered possible atypical migraine the patient reports that her migraines typically have an aura which is different from today's presentation.  Does not appear to be a migraine headache.  Patient was taken for MRI, which showed no acute infarct, mass or hemorrhage or herniation. Patient ABCD2 score for TIA is 4. Based on this, she is a moderate risk. She likely needs vessel assessment of her cartoids. She also needs some possible risk stratification. Based on this, opted to admit for observation. Patient agrees with this plan. Her symptoms are slowly resolving, but are still persistent. No acute intervention required at this time. She was given her home blood pressure medication and her headache is nearly resolved. She otherwise appears well and non-toxic. No other acute events under my care.     Diagnosis:    ICD-10-CM    1. Other specified transient cerebral ischemias G45.8    2. Paresthesia R20.2    3. Episodic tension-type headache, not intractable G44.219    4. Hyperlipidemia LDL goal <100 E78.5    5. Benign essential hypertension I10         Disposition:   Admission.     Scribe Disclosure:  Germania PHILLIPS, am serving as a scribe at 7:34 AM on 5/18/2018 to document services personally performed by Hina Jim MD,  based on my observations and the provider's statements to me.  5/18/2018   [facility] EMERGENCY DEPARTMENT       Hina Jim MD  05/18/18 [id number]

## 2018-05-18 NOTE — PROGRESS NOTES
Patient blood sugar 162 at 1800.  Order states to notify provider if over 150.  PA notified of blood sugar, also that patient had just eaten dinner before glucose check.  Last blood sugar at 1500 result was 100.  No new orders at this time.

## 2018-05-18 NOTE — IP AVS SNAPSHOT
MRN:[id number]                      After Visit Summary   5/18/2018    Luz Thompson    MRN: [id number]           Thank you!     Thank you for choosing [facility] for your care. Our goal is always to provide you with excellent care. Hearing back from our patients is one way we can continue to improve our services. Please take a few minutes to complete the written survey that you may receive in the mail after you visit. If you would like to speak to someone directly about your visit please contact Patient Relations at [phone]. Thank you!          Patient Information     Date Of Birth          1950        About your hospital stay     You were admitted on:  May 18, 2018 You last received care in the:  [facility] Observation Department    You were discharged on:  May 19, 2018        Reason for your hospital stay       Left sided headache with left facial and perioral paresthesias concerning for CVA vs TIA. ED work up was unremarkable. CT head was negative and MRI of the brain was negative for acute infarct. Echocardiogram was done and unchanged, normal function and mild to moderate mitral regurgitation. Carotid US also done and shows 50-69% stenosis bilaterally. Symptoms resolved and likely due to tension vs migraine headache.                  Who to Call     For medical emergencies, please call 911.  For non-urgent questions about your medical care, please call your primary care provider or clinic, [phone]          Attending Provider     Provider Specialty    Hina Jim MD Emergency Medicine    [facility], Fatuma Munguia MD Internal Medicine       Primary Care Provider Office Phone # Fax #    Jennifer Amparo Barraza [phone]       When to contact your care team       Call your primary doctor if you have any of the following: temperature greater than 101,  shortness of breath, chest pain or recurrent neurological deficits such as  "one sided weakness, facial droop, vision changes or speech difficulties.                  After Care Instructions     Activity       Your activity upon discharge: activity as tolerated            Diet       Follow this diet upon discharge: Regular                  Follow-up Appointments     Follow-up and recommended labs and tests        Follow up with primary care provider, Jennifer Barraza, within 7 days for hospital follow- up.  No follow up labs or test are needed.                  Your next 10 appointments already scheduled     May 22, 2018  1:15 PM CDT   (Arrive by 1:00 PM)   Return Visit with Mavis Bermudez MD   [facility] ([facility])    [address]  3rd [facility] [address]   [phone]            Jun 01, 2018 11:30 AM CDT   Return Visit with Randell Mejia MD   [facility] ([facility])    [address] [place] [address]   [phone]            Jun 01, 2018  1:30 PM CDT   MA SCREENING BILATERAL W/ DAVIDA with MGMA1, MG [facility] ([facility])    [address] [place] [id number] [phone]           Three-dimensional (3D) mammograms are available at [facility] locations in [facility], [facility], [place], [place], and [place]. [facility] locations include [place] [facility] in [place]. Benefits of 3D mammograms include: - Improved rate of cancer detection - Decreases your chance of having to go back for more tests, which means fewer: - \"False-positive\" results (This means that there is an abnormal area but it isn't cancer.) - Invasive testing procedures, such as a biopsy or surgery - Can provide clearer images of the breast if you have dense breast tissue. 3D mammography is an optional exam that anyone can have with a 2D mammogram. It doesn't replace or take the place of " a 2D mammogram. 2D mammograms remain an effective screening test for all women.  Not all insurance companies cover the cost of a 3D mammogram. Check with your insurance.            Jun 20, 2018 12:00 PM CDT   CT CHEST W/O CONTRAST with UCCT2   [facility] [place] [facility] ([facility] [place])    [address]  1st Floor  [facility] [address]   [phone]           Please bring any scans or X-rays taken at other hospitals, if similar tests were done. Also bring a list of your medicines, including vitamins, minerals and over-the-counter drugs. It is safest to leave personal items at home.  Be sure to tell your doctor:   If you have any allergies.   If there s any chance you are pregnant.   If you are breastfeeding.  You do not need to do anything special to prepare for this exam.  Please wear loose clothing, such as a sweat suit or jogging clothes. Avoid snaps, zippers and other metal. We may ask you to undress and put on a hospital gown.            Jun 20, 2018  1:00 PM CDT   (Arrive by 12:45 PM)   Return Visit with Preet Villegas MD   [facility] ([facility] [place])    [address]  [address]  [facility] [address]   [phone]            Jun 27, 2018 12:30 PM CDT   RETURN GENERAL with Dora Tovar MD   [facility] ([facility])    [facility]  [address] [place] [address] Clin 9a  [facility] [address]   [phone]            Jun 27, 2018  1:45 PM CDT   RETURN RETINA with Meri Frost MD   [facility] Clinic ([facility])    [facility]  [address] [place] [address] Clin 9a  [facility] [address]   [phone]            Jun 29, 2018 11:30 AM CDT   (Arrive by 11:15 AM)   Return Visit with Britt Oswald NP   [facility] ([facility] [place])    [address]  [address]  [facility] [address]   [phone]            Jul 10, 2018  "10:45 AM CDT   Adult Med Follow UP with LIZ Nesbitt CNS   [facility] ([facility])    [place] [id number]  [address] [address] [phone]                         Pending Results     No orders found for last 3 day(s).            Statement of Approval     Ordered          05/19/18 0943  I have reviewed and agree with all the recommendations and orders detailed in this document.  EFFECTIVE NOW     Approved and electronically signed by:  Dora Mcghee PA-C             Admission Information     Date & Time Provider Department Dept. Phone    5/18/2018 Fatuma Serna MD [facility] Observation Department [phone]      Your Vitals Were     Blood Pressure Pulse Temperature Respirations Height Weight    121/56 (BP Location: Right arm) 74 98.7  F (37.1  C) (Oral) 16 1.715 m (5' 7.5\") 80.7 kg (177 lb 14.4 oz)    Pulse Oximetry BMI (Body Mass Index)                96% 27.45 kg/m2          Cover [employer] Information     [employer] gives you secure access to your electronic health record. If you see a primary care provider, you can also send messages to your care team and make appointments. If you have questions, please call your primary care clinic.  If you do not have a primary care provider, please call [phone] and they will assist you.        Care EveryWhere ID     This is your Care EveryWhere ID. This could be used by other organizations to access your [place] medical records  [id number]        Equal Access to Services     GENE NOWAK : Hadii aad ku hadasho Soomaali, waaxda luqadaha, qaybta kaalmada paegyada, etta hardy. So [facility] [phone].    ATENCIÓN: Si habla español, tiene a jason disposición servicios gratuitos de asistencia lingüística. Llame al [phone].    We comply with applicable federal civil rights laws and [place] laws. We do not discriminate on the basis of race, " color, national origin, age, disability, sex, sexual orientation, or gender identity.               Review of your medicines      CONTINUE these medicines which have NOT CHANGED        Dose / Directions    acetaminophen 500 MG Caps        Dose:  1000 mg   Take 1,000 mg by mouth nightly as needed Take 500-1,000 mg by mouth every 6 hours if needed.   Refills:  0       ARTIFICIAL TEARS OP        Dose:  1 drop   Apply 1 drop to eye as needed   Refills:  0       BENEFIBER Powd        2 teaspoons daily   Refills:  0       calcitRIOL 0.5 MCG capsule   Commonly known as:  ROCALTROL        Dose:  0.5 mcg   Take 1 capsule (0.5 mcg) by mouth daily   Quantity:  90 capsule   Refills:  3       clotrimazole 1 % cream   Commonly known as:  LOTRIMIN        Apply topically 2 times daily as needed Reported on 4/25/2017   Refills:  0       ELIQUIS 2.5 MG tablet   Generic drug:  apixaban ANTICOAGULANT        Dose:  2.5 mg   Take 2.5 mg by mouth 2 times daily   Refills:  0       estradiol 0.1 MG/GM cream   Commonly known as:  ESTRACE VAGINAL   Used for:  Atrophic vaginitis        Dose:  2 g   Place 2 g vaginally twice a week   Quantity:  42.5 g   Refills:  11       ezetimibe 10 MG tablet   Commonly known as:  ZETIA        Dose:  10 mg   Take 10 mg by mouth every evening   Quantity:  30 tablet   Refills:  0       folic acid 1 MG tablet   Commonly known as:  FOLVITE   Used for:  Liver replaced by transplant (H)        Dose:  1 mg   Take 1 tablet (1 mg) by mouth daily   Quantity:  100 tablet   Refills:  3       furosemide 20 MG tablet   Commonly known as:  LASIX   Used for:  CKD (chronic kidney disease) stage 3, GFR 30-59 ml/min, Liver replaced by transplant (H)        Dose:  10 mg   Take 0.5 tablets (10 mg) by mouth daily   Quantity:  46 tablet   Refills:  3       hydrALAZINE 25 MG tablet   Commonly known as:  APRESOLINE   Used for:  HTN (hypertension)        Dose:  12.5 mg   Take 0.5 tablets (12.5 mg) by mouth 3 times daily as needed , if  systolic blood pressure is more than 140 mmHg.   Quantity:  270 tablet   Refills:  3       meclizine 25 MG tablet   Commonly known as:  ANTIVERT   Used for:  Dizziness        Dose:  25 mg   Take 1 tablet (25 mg) by mouth as needed   Quantity:  30 tablet   Refills:  11       metoprolol succinate 50 MG 24 hr tablet   Commonly known as:  TOPROL-XL   Used for:  Paroxysmal atrial fibrillation (H)        Dose:  50 mg   Take 1 tablet (50 mg) by mouth daily   Quantity:  90 tablet   Refills:  3       nitroFURantoin (macrocrystal-monohydrate) 100 MG capsule   Commonly known as:  MACROBID   Used for:  Urinary tract infection without hematuria, site unspecified        Dose:  100 mg   Take 1 capsule (100 mg) by mouth 2 times daily For one week at onset of UTI symptoms   Quantity:  14 capsule   Refills:  6       predniSONE 5 MG tablet   Commonly known as:  DELTASONE   Used for:  Thyroid cancer (H), Liver replaced by transplant (H)        Dose:  5 mg   Take 1 tablet (5 mg) by mouth daily   Quantity:  90 tablet   Refills:  3       PRESERVISION AREDS 2 Caps        Dose:  1 capsule   Take 1 capsule by mouth 2 times daily   Refills:  0       sertraline 50 MG tablet   Commonly known as:  ZOLOFT   Used for:  Adjustment disorder with depressed mood        Dose:  50 mg   Take 1 tablet (50 mg) by mouth daily   Quantity:  30 tablet   Refills:  0       sirolimus 0.5 MG Tabs tablet        Dose:  0.5 mg   Take 0.5 mg by mouth every other day   Refills:  0       SUMAtriptan 50 MG tablet   Commonly known as:  IMITREX   Used for:  Migraine without aura and without status migrainosus, not intractable        Dose:  50 mg   Take 1 tablet (50 mg) by mouth at onset of headache for migraine repeat after 2 hours if needed.   Quantity:  30 tablet   Refills:  3       * SYNTHROID 150 MCG tablet   Generic drug:  levothyroxine        Take 150mcg by mouth daily 6 days a week & 225mcg on Mondays   Refills:  0       * SYNTHROID 150 MCG tablet   Generic drug:   levothyroxine        Dose:  225 mcg   Take 225 mcg by mouth on Mondays   Refills:  0       triamcinolone 0.1 % cream   Commonly known as:  KENALOG        Apply topically 2 times daily as needed for irritation   Refills:  0       ursodiol 250 MG tablet   Commonly known as:  ACTIGALL   Used for:  Liver replaced by transplant (H)        Dose:  250 mg   Take 1 tablet (250 mg) by mouth 2 times daily   Quantity:  180 tablet   Refills:  3       voriconazole 200 MG tablet   Commonly known as:  VFEND   Used for:  Coccidioidal pneumonitis (H)        Dose:  200 mg   Take 1 tablet (200 mg) by mouth 2 times daily   Quantity:  60 tablet   Refills:  2       * Notice:  This list has 2 medication(s) that are the same as other medications prescribed for you. Read the directions carefully, and ask your doctor or other care provider to review them with you.             Protect others around you: Learn how to safely use, store and throw away your medicines at www.disposemymeds.org.             Medication List: This is a list of all your medications and when to take them. Check marks below indicate your daily home schedule. Keep this list as a reference.      Medications           Morning Afternoon Evening Bedtime As Needed    acetaminophen 500 MG Caps   Take 1,000 mg by mouth nightly as needed Take 500-1,000 mg by mouth every 6 hours if needed.                                ARTIFICIAL TEARS OP   Apply 1 drop to eye as needed                                BENEFIBER Powd   2 teaspoons daily                                calcitRIOL 0.5 MCG capsule   Commonly known as:  ROCALTROL   Take 1 capsule (0.5 mcg) by mouth daily   Last time this was given:  0.5 mcg on 5/19/2018  9:10 AM                                clotrimazole 1 % cream   Commonly known as:  LOTRIMIN   Apply topically 2 times daily as needed Reported on 4/25/2017                                ELIQUIS 2.5 MG tablet   Take 2.5 mg by mouth 2 times daily   Last time this was  given:  2.5 mg on 5/19/2018  9:10 AM   Generic drug:  apixaban ANTICOAGULANT                                estradiol 0.1 MG/GM cream   Commonly known as:  ESTRACE VAGINAL   Place 2 g vaginally twice a week                                ezetimibe 10 MG tablet   Commonly known as:  ZETIA   Take 10 mg by mouth every evening   Last time this was given:  10 mg on 5/18/2018  8:13 PM                                folic acid 1 MG tablet   Commonly known as:  FOLVITE   Take 1 tablet (1 mg) by mouth daily   Last time this was given:  1 mg on 5/19/2018  9:10 AM                                furosemide 20 MG tablet   Commonly known as:  LASIX   Take 0.5 tablets (10 mg) by mouth daily   Last time this was given:  10 mg on 5/19/2018  9:11 AM                                hydrALAZINE 25 MG tablet   Commonly known as:  APRESOLINE   Take 0.5 tablets (12.5 mg) by mouth 3 times daily as needed , if systolic blood pressure is more than 140 mmHg.   Last time this was given:  12.5 mg on 5/18/2018  8:49 AM                                meclizine 25 MG tablet   Commonly known as:  ANTIVERT   Take 1 tablet (25 mg) by mouth as needed                                metoprolol succinate 50 MG 24 hr tablet   Commonly known as:  TOPROL-XL   Take 1 tablet (50 mg) by mouth daily   Last time this was given:  50 mg on 5/19/2018  9:10 AM                                nitroFURantoin (macrocrystal-monohydrate) 100 MG capsule   Commonly known as:  MACROBID   Take 1 capsule (100 mg) by mouth 2 times daily For one week at onset of UTI symptoms                                predniSONE 5 MG tablet   Commonly known as:  DELTASONE   Take 1 tablet (5 mg) by mouth daily   Last time this was given:  5 mg on 5/19/2018  9:10 AM                                PRESERVISION AREDS 2 Caps   Take 1 capsule by mouth 2 times daily                                sertraline 50 MG tablet   Commonly known as:  ZOLOFT   Take 1 tablet (50 mg) by mouth daily                                 sirolimus 0.5 MG Tabs tablet   Take 0.5 mg by mouth every other day                                SUMAtriptan 50 MG tablet   Commonly known as:  IMITREX   Take 1 tablet (50 mg) by mouth at onset of headache for migraine repeat after 2 hours if needed.                                * SYNTHROID 150 MCG tablet   Take 150mcg by mouth daily 6 days a week & 225mcg on Mondays   Last time this was given:  150 mcg on 5/19/2018  9:10 AM   Generic drug:  levothyroxine                                * SYNTHROID 150 MCG tablet   Take 225 mcg by mouth on Mondays   Last time this was given:  150 mcg on 5/19/2018  9:10 AM   Generic drug:  levothyroxine                                triamcinolone 0.1 % cream   Commonly known as:  KENALOG   Apply topically 2 times daily as needed for irritation                                ursodiol 250 MG tablet   Commonly known as:  ACTIGALL   Take 1 tablet (250 mg) by mouth 2 times daily   Last time this was given:  250 mg on 5/19/2018  9:11 AM                                voriconazole 200 MG tablet   Commonly known as:  VFEND   Take 1 tablet (200 mg) by mouth 2 times daily   Last time this was given:  200 mg on 5/19/2018  9:11 AM                                * Notice:  This list has 2 medication(s) that are the same as other medications prescribed for you. Read the directions carefully, and ask your doctor or other care provider to review them with you.

## 2018-05-18 NOTE — ED TRIAGE NOTES
Pt presents to ED via EMS for headache that began last night. Headache is left sided above and behind eye, rated 7/10. Hx of stroke with headache that pt reports feels similar to this one. Reports numbness of left side of face. AOx4, no facial drooping, no unilateral weakness, CMS intact. VSS AOx4.

## 2018-05-18 NOTE — PLAN OF CARE
Problem: Patient Care Overview  Goal: Plan of Care/Patient Progress Review  ROOM # 205    Living Situation (if not independent, order SW consult): Ind alone  Facility name: N/A  : Gloria, Daughter     Activity level at baseline: IND  Activity level on admit: SBA      Patient registered to observation; given Patient Bill of Rights; given the opportunity to ask questions about observation status and their plan of care.  Patient has been oriented to the observation room, bathroom and call light is in place.    Discussed discharge goals and expectations with patient/family.

## 2018-05-18 NOTE — ED NOTES
Bed: ED02  Expected date: 5/18/18  Expected time: 7:01 AM  Means of arrival: Ambulance  Comments:  [place]

## 2018-05-18 NOTE — PHARMACY-ADMISSION MEDICATION HISTORY
Admission medication history interview status for this patient is complete. See [facility] admission navigator for allergy information, prior to admission medications and immunization status.     Medication history interview source(s):Patient  Medication history resources (including written lists, pill bottles, clinic record): care Everywhere records  [facility] [place]    Changes made to PTA medication list:  Added:  None  Deleted:  None  Changed:   - Tylenol 500-1000mg q6hprn ---> 1000mg qhs prn  - Zetia 5mg qpm ---> 10mg qhs  - Sirolimus 0.5mg qod ---> Rapamune Brand 0.5mg qod    Actions taken by pharmacist (provider contacted, etc):None     Additional medication history information: Pt takes brand Rapamune    Medication reconciliation/reorder completed by provider prior to medication history? No    Do you take OTC medications (eg tylenol, ibuprofen, fish oil, eye/ear drops, etc)?  Yes, as listed.    For patients on insulin therapy:  No       Prior to Admission medications    Medication Sig Last Dose Taking? Auth Provider   calcitRIOL (ROCALTROL) 0.5 MCG capsule Take 1 capsule (0.5 mcg) by mouth daily 5/17/2018 at   Yes Rach Vasquez MD   ELIQUIS 2.5 MG tablet Take 2.5 mg by mouth 2 times daily  5/17/2018 at pm Yes Reported, Patient   ezetimibe (ZETIA) 10 MG tablet Take 10 mg by mouth every evening  5/17/2018 at pm Yes Yuridia Frost MD   folic acid (FOLVITE) 1 MG tablet Take 1 tablet (1 mg) by mouth daily 5/17/2018 at am Yes Gentry Ramirez MD   furosemide (LASIX) 20 MG tablet Take 0.5 tablets (10 mg) by mouth daily 5/17/2018 at am Yes Randell Reyna MD   hydrALAZINE (APRESOLINE) 25 MG tablet Take 0.5 tablets (12.5 mg) by mouth 3 times daily as needed , if systolic blood pressure is more than 140 mmHg. 5/17/2018 at pm Yes Randell Reyna MD   levothyroxine (SYNTHROID) 150 MCG tablet Take 150mcg by mouth daily 6 days a week & 225mcg on Mondays 5/17/2018 at am Yes Unknown,  Entered By History   levothyroxine (SYNTHROID) 150 MCG tablet Take 225 mcg by mouth on Mondays 5/14/2018 at am Yes Unknown, Entered By History   metoprolol (TOPROL-XL) 50 MG 24 hr tablet Take 1 tablet (50 mg) by mouth daily 5/17/2018 at   Yes Graham Gilmore MD   Multiple Vitamins-Minerals (PRESERVISION AREDS 2) CAPS Take 1 capsule by mouth 2 times daily 5/17/2018 at pm Yes Unknown, Entered By History   predniSONE (DELTASONE) 5 MG tablet Take 1 tablet (5 mg) by mouth daily 5/17/2018 at   Yes Gentry Ramirez MD   RAPAMUNE (BRAND) 0.5 MG PO TABLET Take 0.5 mg by mouth every other day 5/16/2018 Yes Unknown, Entered By History   SUMAtriptan (IMITREX) 50 MG tablet Take 1 tablet (50 mg) by mouth at onset of headache for migraine repeat after 2 hours if needed. prn at   Yes Jennifer Barraza MD   triamcinolone (KENALOG) 0.1 % cream Apply topically 2 times daily as needed for irritation prn Yes Unknown, Entered By History   ursodiol (ACTIGALL) 250 MG tablet Take 1 tablet (250 mg) by mouth 2 times daily 5/17/2018 at pm Yes Gentry Ramirez MD   voriconazole (VFEND) 200 MG tablet Take 1 tablet (200 mg) by mouth 2 times daily 5/17/2018 at pm Yes Crystal Montero MD   Wheat Dextrin (BENEFIBER) POWD 2 teaspoons daily 5/17/2018 at am Yes Crystal Montero MD   acetaminophen 500 MG CAPS Take 1,000 mg by mouth nightly as needed Take 500-1,000 mg by mouth every 6 hours if needed.  5/16/2018 at hs  Reported, Patient   clotrimazole (LOTRIMIN) 1 % cream Apply topically 2 times daily as needed Reported on 4/25/2017 prn  Crystal Montero MD   estradiol (ESTRACE VAGINAL) 0.1 MG/GM cream Place 2 g vaginally twice a week 5/15/2018  Ricardo Jason MD   Hypromellose (ARTIFICIAL TEARS OP) Apply 1 drop to eye as needed prn  Reported, Patient   meclizine (ANTIVERT) 25 MG tablet Take 1 tablet (25 mg) by mouth as needed prn  Jennifer Barraza MD   nitroFURantoin, macrocrystal-monohydrate, (MACROBID) 100 MG capsule Take 1  capsule (100 mg) by mouth 2 times daily For one week at onset of UTI symptoms  Patient not taking: Reported on 5/15/2018 Ricardo Davies MD   sertraline (ZOLOFT) 50 MG tablet Take 1 tablet (50 mg) by mouth daily  Patient not taking: Reported on 5/15/2018 Didn't start yet  Luzma Mercer, APRN CNS

## 2018-05-19 VITALS
OXYGEN SATURATION: 100 % | RESPIRATION RATE: 16 BRPM | TEMPERATURE: 96.9 F | WEIGHT: 177.9 LBS | SYSTOLIC BLOOD PRESSURE: 110 MMHG | DIASTOLIC BLOOD PRESSURE: 56 MMHG | HEART RATE: 74 BPM | HEIGHT: 68 IN | BODY MASS INDEX: 26.96 KG/M2

## 2018-05-19 LAB
ANION GAP SERPL CALCULATED.3IONS-SCNC: 6 MMOL/L (ref 3–14)
BUN SERPL-MCNC: 20 MG/DL (ref 7–30)
CALCIUM SERPL-MCNC: 8.5 MG/DL (ref 8.5–10.1)
CHLORIDE SERPL-SCNC: 103 MMOL/L (ref 94–109)
CO2 SERPL-SCNC: 27 MMOL/L (ref 20–32)
CREAT SERPL-MCNC: 1.29 MG/DL (ref 0.52–1.04)
ERYTHROCYTE [DISTWIDTH] IN BLOOD BY AUTOMATED COUNT: 16.2 % (ref 10–15)
GFR SERPL CREATININE-BSD FRML MDRD: 41 ML/MIN/1.7M2
GLUCOSE SERPL-MCNC: 103 MG/DL (ref 70–99)
HCT VFR BLD AUTO: 33 % (ref 35–47)
HGB BLD-MCNC: 10.3 G/DL (ref 11.7–15.7)
MCH RBC QN AUTO: 27.3 PG (ref 26.5–33)
MCHC RBC AUTO-ENTMCNC: 31.2 G/DL (ref 31.5–36.5)
MCV RBC AUTO: 88 FL (ref 78–100)
PLATELET # BLD AUTO: 302 10E9/L (ref 150–450)
POTASSIUM SERPL-SCNC: 4.1 MMOL/L (ref 3.4–5.3)
RBC # BLD AUTO: 3.77 10E12/L (ref 3.8–5.2)
SODIUM SERPL-SCNC: 136 MMOL/L (ref 133–144)
WBC # BLD AUTO: 6.1 10E9/L (ref 4–11)

## 2018-05-19 PROCEDURE — G0378 HOSPITAL OBSERVATION PER HR: HCPCS

## 2018-05-19 PROCEDURE — 80048 BASIC METABOLIC PNL TOTAL CA: CPT | Performed by: PHYSICIAN ASSISTANT

## 2018-05-19 PROCEDURE — 99217 ZZC OBSERVATION CARE DISCHARGE: CPT | Performed by: PHYSICIAN ASSISTANT

## 2018-05-19 PROCEDURE — 25000132 ZZH RX MED GY IP 250 OP 250 PS 637: Mod: GY | Performed by: PHYSICIAN ASSISTANT

## 2018-05-19 PROCEDURE — 25000125 ZZHC RX 250: Performed by: PHYSICIAN ASSISTANT

## 2018-05-19 PROCEDURE — 36415 COLL VENOUS BLD VENIPUNCTURE: CPT | Performed by: PHYSICIAN ASSISTANT

## 2018-05-19 PROCEDURE — 85027 COMPLETE CBC AUTOMATED: CPT | Performed by: PHYSICIAN ASSISTANT

## 2018-05-19 PROCEDURE — A9270 NON-COVERED ITEM OR SERVICE: HCPCS | Mod: GY | Performed by: PHYSICIAN ASSISTANT

## 2018-05-19 PROCEDURE — 00000146 ZZHCL STATISTIC GLUCOSE BY METER IP

## 2018-05-19 RX ADMIN — VORICONAZOLE 200 MG: 200 TABLET ORAL at 09:11

## 2018-05-19 RX ADMIN — PREDNISONE 5 MG: 5 TABLET ORAL at 09:10

## 2018-05-19 RX ADMIN — URSODIOL 250 MG: 250 TABLET ORAL at 09:11

## 2018-05-19 RX ADMIN — FOLIC ACID 1 MG: 1 TABLET ORAL at 09:10

## 2018-05-19 RX ADMIN — APIXABAN 2.5 MG: 2.5 TABLET, FILM COATED ORAL at 09:10

## 2018-05-19 RX ADMIN — LEVOTHYROXINE SODIUM 150 MCG: 150 TABLET ORAL at 09:10

## 2018-05-19 RX ADMIN — CALCITRIOL 0.5 MCG: 0.25 CAPSULE, LIQUID FILLED ORAL at 09:10

## 2018-05-19 RX ADMIN — FUROSEMIDE 10 MG: 20 TABLET ORAL at 09:11

## 2018-05-19 RX ADMIN — METOPROLOL SUCCINATE 50 MG: 50 TABLET, EXTENDED RELEASE ORAL at 09:10

## 2018-05-19 NOTE — PLAN OF CARE
Problem: Patient Care Overview  Goal: Discharge Needs Assessment  Outcome: Adequate for Discharge Date Met: 05/19/18  Patient's After Visit Summary was reviewed with patient.  Patient verbalized understanding of After Visit Summary, recommended follow up and was given an opportunity to ask questions.   Discharged with daughter.    OBSERVATION patient END time: 10:28 AM

## 2018-05-19 NOTE — PLAN OF CARE
Problem: Patient Care Overview  Goal: Plan of Care/Patient Progress Review  PRIMARY DIAGNOSIS: Headache, Numbness Left side of face on admission  OUTPATIENT/OBSERVATION GOALS TO BE MET BEFORE DISCHARGE:  1. ADLs back to baseline: Yes    2. Activity and level of assistance: Independent    3. Pain status: Denies pain    4. Return to near baseline physical activity: Yes     Discharge Planner Nurse   Safe discharge environment identified: Yes  Barriers to discharge: Yes       Entered by: Melody Soria 05/18/2018 9:41 PM     Patient alert and oriented.  Neuro check intact x2.  Denies further numbness and tingling in left side face and lip.  Lung sounds clear.  Telemetry monitoring: SR HR 81 per tele tech.  Bowel sounds active.  Up independently in room. Contact precautions maintained.  Will continue to monitor.       Please review provider order for any additional goals.   Nurse to notify provider when observation goals have been met and patient is ready for discharge.

## 2018-05-19 NOTE — PLAN OF CARE
Problem: Patient Care Overview  Goal: Plan of Care/Patient Progress Review  Outcome: Improving  PRIMARY DIAGNOSIS: Headache L side with numbness, rule out TIA   OUTPATIENT/OBSERVATION GOALS TO BE MET BEFORE DISCHARGE:  1. Orthostatic performed: N/A    2. Diagnostic testing complete & at baseline neurologic testing: Yes    3. Cleared by consultants (if involved): N/A    4. Interpretation of cardiac rhythm per telemetry tech: SR 66    5. Tolerating adequate PO diet and medications: Yes    6. Return to near baseline physical activity or neurologic status: Yes, pt is ind in room     Pt is A&Ox4. VSS. Pt denies any pain or numbness. Pt reports her symptoms from admission have resolved. Pt denies any dizziness. Pt reports generalized weakness. BS at 0000 was 135. BS checks every 6 hours. Pt is tolerating regular diet. Will continue to monitor.     Discharge Planner Nurse   Safe discharge environment identified: Yes  Barriers to discharge: No       Entered by: Phuong Rodriguez 05/19/2018 1:07 AM     Please review provider order for any additional goals.   Nurse to notify provider when observation goals have been met and patient is ready for discharge.

## 2018-05-19 NOTE — PLAN OF CARE
"Problem: Patient Care Overview  Goal: Plan of Care/Patient Progress Review  Outcome: Improving  PRIMARY DIAGNOSIS: LEFT-SIDED HEADACHE/FACIAL NUMBNESS   OUTPATIENT/OBSERVATION GOALS TO BE MET BEFORE DISCHARGE:  1. Orthostatic performed: N/A      2. Diagnostic testing complete & at baseline neurologic testing: Yes      3. Cleared by consultants (if involved): N/A      4. Interpretation of cardiac rhythm per telemetry tech: SR, heart rate 60-80's      5. Tolerating adequate PO diet and medications: Yes      6. Return to near baseline physical activity or neurologic status: Yes, Up independently.       Vitally stable; blood pressure 121/56 this AM. Reports facial numbness has resolved. Reports L frontal headache has resolved; now head \"just hurts, like after a migraine.\" Denies vision changes. Neurologically stable and intact. Ambulated in [place] this AM and tolerated well without dizziness/weakness. Carotid US and echo with bubble study resulted. Probable discharge to home today.      Discharge Planner Nurse   Safe discharge environment identified: Yes  Barriers to discharge: No       Entered by: Yamilet Muñoz, 5/19/2018    Please review provider order for any additional goals.   Nurse to notify provider when observation goals have been met and patient is ready for discharge.      "

## 2018-05-19 NOTE — PLAN OF CARE
Problem: Patient Care Overview  Goal: Plan of Care/Patient Progress Review  Outcome: Improving  PRIMARY DIAGNOSIS: Headache L side with numbness, rule out TIA   OUTPATIENT/OBSERVATION GOALS TO BE MET BEFORE DISCHARGE:  1. Orthostatic performed: N/A     2. Diagnostic testing complete & at baseline neurologic testing: Yes     3. Cleared by consultants (if involved): N/A     4. Interpretation of cardiac rhythm per telemetry tech: SR 70     5. Tolerating adequate PO diet and medications: Yes     6. Return to near baseline physical activity or neurologic status: Yes, pt is ind in room      Pt is A&Ox4. VSS. Pt denies any pain or numbness. Pt reports her symptoms from admission have resolved. Pt denies any dizziness. Pt reports generalized weakness. BS at 0000 was 135. BS checks every 6 hours. Pt is tolerating regular diet. Will continue to monitor.      Discharge Planner Nurse   Safe discharge environment identified: Yes  Barriers to discharge: No       Entered by: Phuong Rodriguez 05/19/2018 4:07 AM  Please review provider order for any additional goals.   Nurse to notify provider when observation goals have been met and patient is ready for discharge.

## 2018-05-19 NOTE — DISCHARGE SUMMARY
"[facility] Outpatient / Observation Unit  Discharge Summary        Luz Thompson MRN# [id number]   YOB: 1950 Age: 68 year old     Date of Admission:  5/18/2018  Date of Discharge:  5/19/2018 11:21 AM  Admitting Physician:  Fatuma Serna MD  Discharge Physician: Dora Mcghee PA-C  Discharging Service: Hospitalist      Primary Provider: Jennifer Barraza  Primary Care Physician Phone Number: [phone]         Primary Discharge Diagnoses:    Luz Thompson was admitted on 5/18/2018 with complaints of left sided headache, left sided facial and perioral paresthesias concerning for TIA.     1. Episode of left sided headache, left sided facial and perioral paresthesias - Suspect due to tension type headache vs atypical migraine. CVA/TIA work up is negative. MRI brain negative for acute infarct. Echocardiogram is unchanged. Carotid US shows bilateral stenosis, 50-69%. Symptoms now resolved. Does have increased stressors related to a divorce. 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      Diverticulosis of sigmoid colon 12/21/2013     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     Received Rituxan during Summer of 2016     H/O esophageal varices      Hearing loss      Heart murmur 4/4/2012     History of DVT (deep vein thrombosis)      History of [place] fever      History of thyroid cancer 9/25/2012     Hyperlipidemia 4/10/2012    Says that she does not have it anymore, not on meds     Hypertension goal BP " (blood pressure) < 140/80 11/6/2013     Hypertriglyceridemia      Liver replaced by transplant (H) 10/17/2011    Dr. 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 ED work up was unremarkable. CT head was negative and MRI   of the brain was negative for acute infarct. Echocardiogram was done and   unchanged, normal function and mild to moderate mitral regurgitation.   Carotid US also done and shows 50-69% stenosis bilaterally. Symptoms   resolved and likely due to tension vs migraine headache.                    Please refer to initial admission history and physical for further details.   Briefly, Luz Thompson was admitted on 5/18/2018 for complaints of left sided headache, left sided facial and perioral paresthesia concerning for TIA.     Initial work up in the ED revealed a negative CT scan of the head for any acute process.   EKG did not reveal evidence of significant cardiac arrhythmias or ischemia.  Pt was registered to the Observation Unit for further evaluation.     Pt was placed on telemetry and underwent frequent neuro checks.   Pt is already anticoagulated with Eliquis.  Laboratory results were reviewed and significant findings addressed.   ECHO with bubble was performed as well as MRI of the brain and carotid US (with results listed below). No evidence of acute CVA was found.  On the day of discharge, patient had  resolution of the symptoms, telemetry did not reveal any significant arrhythmias, vitals remained stable and pt was deemed safe for discharge. Medications were reviewed and adjustments made as necessary. Pt is instructed to follow up as below.           Significant Lab During Hospitalization:        Recent Labs  Lab 05/19/18  0629 05/18/18  0731 05/14/18  1019   WBC 6.1 8.0 8.6   HGB 10.3* 11.6* 11.5*   HCT 33.0* 36.0 36.1   MCV 88 86 89    324 326       Recent Labs  Lab 05/19/18  0629 05/18/18  0731 05/14/18  1019    137 133   POTASSIUM 4.1 3.6 3.3*   CHLORIDE 103 100 100   CO2 27 27 27   ANIONGAP 6 10 6   * 119* 124*   BUN 20 20 23   CR 1.29* 1.22* 1.40*   GFRESTIMATED 41* 44* 37*   GFRESTBLACK 50* 53* 45*   KEILY 8.5 8.6 9.0   PROTTOTAL  --   --  7.2   ALBUMIN  --   --  3.1*   BILITOTAL  --   --  0.2   ALKPHOS  --   --  240*   AST  --   --  23   ALT  --   --  43       Recent Labs  Lab 05/18/18  0731   INR 1.06       Recent Labs  Lab 05/18/18  0731   CHOL 265*   HDL 49*   LDL Cannot estimate LDL when triglyceride exceeds 400 mg/dL  150*   TRIG 557*       Recent Labs  Lab 05/18/18  0731   TROPI <0.015                Significant Imaging During Hospitalization:      Recent Results (from the past 48 hour(s))   CT Head w/o Contrast    Narrative    CT SCAN OF THE HEAD WITHOUT CONTRAST   5/18/2018 8:08 AM     HISTORY: Headache, on Eliquis, vague numbness.     TECHNIQUE:  Axial images of the head and coronal reformations without  IV contrast material. Radiation dose for this scan was reduced using  automated exposure control, adjustment of the mA and/or kV according  to patient size, or iterative reconstruction technique.    COMPARISON: Brain MR 9/25/2012.    FINDINGS: There is no evidence of intracranial hemorrhage, mass, acute  infarct or anomaly. There is generalized atrophy of the brain. There  is low attenuation in the white matter of the cerebral hemispheres  consistent with sequelae of small  vessel ischemic disease. Ventricular  size within normal limits without evidence of hydrocephalus.     The visualized portions of the sinuses and mastoids appear normal. The  bony calvarium and bones of the skull base appear intact.       Impression    IMPRESSION:     1. No evidence of acute intracranial hemorrhage, mass, or herniation.  2. There is generalized atrophy of the brain. White matter changes are  present in the cerebral hemispheres that are consistent with small  vessel ischemic disease in this age patient.    JACQUELINE SANTANA MD   MR Brain w/o Contrast    Narrative    MRI BRAIN WITHOUT CONTRAST  5/18/2018 9:24 AM    HISTORY: Rule out stroke.    TECHNIQUE: Multiplanar, multisequence MRI of the brain without  gadolinium IV contrast material.      COMPARISON: Head CT from earlier the same day.    FINDINGS: There is no evidence of hemorrhage, mass, acute infarct, or  anomaly. Moderate diffuse parenchymal volume loss. Moderate patchy  deep and subcortical white matter T2 hyperintensities including  lesions in the central alek which are nonspecific, but likely related  to chronic microvascular ischemic disease. Ventricular size within  normal limits without hydrocephalus.     The facial structures appear normal. The arteries at the base of the  brain and the dural venous sinuses appear patent.      Impression    IMPRESSION:    1. No evidence of acute infarct, mass, hemorrhage, or herniation.  2. Moderate diffuse parenchymal volume loss and white matter changes  likely due to chronic microvascular ischemic disease.      JACQUELINE SANTANA MD   US carotid bilateral    Narrative    BILATERAL CAROTID ULTRASOUND   5/18/2018 2:47 PM     HISTORY:  CVI;     COMPARISON: None.    RIGHT CAROTID FINDINGS:  No significant plaque  Right ICA PSV:  165  cm/sec.  Right ICA EDV:  53 cm/sec.  Right ICA/CCA PSV Ratio:  2.3    These indicate  50 - 69%  diameter stenosis of the right ICA.    Right Vertebral: Antegrade flow.   Right ECA:  Antegrade flow.     LEFT CAROTID FINDINGS:  No significant plaque  Left ICA PSV:  134  cm/sec.  Left ICA EDV:  47 cm/sec.  Left ICA/CCA PSV Ratio:  1.6    These indicate  50 - 69%  diameter stenosis of the left ICA.    Left Vertebral: Antegrade flow.   Left ECA: Antegrade flow.     Causes of Decreased Accuracy:   None.       Impression    IMPRESSION:    1. 50-69% diameter stenosis of the right ICA relative to the distal  ICA diameter.   2. 50-69 % diameter stenosis of the left ICA relative to the distal  ICA diameter.     ANJELICA LOZOYA DO              Pending Results:        Unresulted Labs Ordered in the Past 30 Days of this Admission     No orders found for last 61 day(s).              Consultations This Hospital Stay:      No consultations were requested during this admission         Discharge Instructions and Follow-Up:      Follow-up Appointments     Follow-up and recommended labs and tests        Follow up with primary care provider, Jennifer Barraza, within 7 days   for hospital follow- up.  No follow up labs or test are needed.                  Pt instructed to follow up with PCP  Follow-up Labs None        Discharge Disposition:      Discharged to home         Discharge Medications:        Current Discharge Medication List      CONTINUE these medications which have NOT CHANGED    Details   calcitRIOL (ROCALTROL) 0.5 MCG capsule Take 1 capsule (0.5 mcg) by mouth daily  Qty: 90 capsule, Refills: 3      ELIQUIS 2.5 MG tablet Take 2.5 mg by mouth 2 times daily       ezetimibe (ZETIA) 10 MG tablet Take 10 mg by mouth every evening   Qty: 30 tablet, Refills: 0      folic acid (FOLVITE) 1 MG tablet Take 1 tablet (1 mg) by mouth daily  Qty: 100 tablet, Refills: 3    Associated Diagnoses: Liver replaced by transplant (H)      furosemide (LASIX) 20 MG tablet Take 0.5 tablets (10 mg) by mouth daily  Qty: 46 tablet, Refills: 3    Associated Diagnoses: CKD (chronic kidney disease) stage 3, GFR 30-59 ml/min; Liver  replaced by transplant (H)      hydrALAZINE (APRESOLINE) 25 MG tablet Take 0.5 tablets (12.5 mg) by mouth 3 times daily as needed , if systolic blood pressure is more than 140 mmHg.  Qty: 270 tablet, Refills: 3    Associated Diagnoses: HTN (hypertension)      !! levothyroxine (SYNTHROID) 150 MCG tablet Take 150mcg by mouth daily 6 days a week & 225mcg on Mondays      !! levothyroxine (SYNTHROID) 150 MCG tablet Take 225 mcg by mouth on Mondays      metoprolol (TOPROL-XL) 50 MG 24 hr tablet Take 1 tablet (50 mg) by mouth daily  Qty: 90 tablet, Refills: 3    Associated Diagnoses: Paroxysmal atrial fibrillation (H)      Multiple Vitamins-Minerals (PRESERVISION AREDS 2) CAPS Take 1 capsule by mouth 2 times daily      predniSONE (DELTASONE) 5 MG tablet Take 1 tablet (5 mg) by mouth daily  Qty: 90 tablet, Refills: 3    Associated Diagnoses: Thyroid cancer (H); Liver replaced by transplant (H)      RAPAMUNE (BRAND) 0.5 MG PO TABLET Take 0.5 mg by mouth every other day      SUMAtriptan (IMITREX) 50 MG tablet Take 1 tablet (50 mg) by mouth at onset of headache for migraine repeat after 2 hours if needed.  Qty: 30 tablet, Refills: 3    Associated Diagnoses: Migraine without aura and without status migrainosus, not intractable      triamcinolone (KENALOG) 0.1 % cream Apply topically 2 times daily as needed for irritation      ursodiol (ACTIGALL) 250 MG tablet Take 1 tablet (250 mg) by mouth 2 times daily  Qty: 180 tablet, Refills: 3    Associated Diagnoses: Liver replaced by transplant (H)      voriconazole (VFEND) 200 MG tablet Take 1 tablet (200 mg) by mouth 2 times daily  Qty: 60 tablet, Refills: 2    Associated Diagnoses: Coccidioidal pneumonitis (H)      Wheat Dextrin (BENEFIBER) POWD 2 teaspoons daily      acetaminophen 500 MG CAPS Take 1,000 mg by mouth nightly as needed Take 500-1,000 mg by mouth every 6 hours if needed.       clotrimazole (LOTRIMIN) 1 % cream Apply topically 2 times daily as needed Reported on  4/25/2017      estradiol (ESTRACE VAGINAL) 0.1 MG/GM cream Place 2 g vaginally twice a week  Qty: 42.5 g, Refills: 11    Associated Diagnoses: Atrophic vaginitis      Hypromellose (ARTIFICIAL TEARS OP) Apply 1 drop to eye as needed      meclizine (ANTIVERT) 25 MG tablet Take 1 tablet (25 mg) by mouth as needed  Qty: 30 tablet, Refills: 11    Associated Diagnoses: Dizziness      nitroFURantoin, macrocrystal-monohydrate, (MACROBID) 100 MG capsule Take 1 capsule (100 mg) by mouth 2 times daily For one week at onset of UTI symptoms  Qty: 14 capsule, Refills: 6    Associated Diagnoses: Urinary tract infection without hematuria, site unspecified      sertraline (ZOLOFT) 50 MG tablet Take 1 tablet (50 mg) by mouth daily  Qty: 30 tablet, Refills: 0    Associated Diagnoses: Adjustment disorder with depressed mood       !! - Potential duplicate medications found. Please discuss with provider.              Allergies:         Allergies   Allergen Reactions     Fluconazole Hives and Itching     Ciprofloxacin Anxiety and Other (See Comments)     Irregular heart beat     Azithromycin Itching     Benadryl [Diphenhydramine Hcl]      Insomnia      Cellcept Diarrhea     Ciprofloxacin Other (See Comments)     Insomnia, mood lability     Codeine      Psych disturbance     Codeine      Diphenhydramine Other (See Comments)     Doxycycline      Lansoprazole Diarrhea     Levaquin [Levofloxacin] Other (See Comments)     Headache, hyperactivity     Lisinopril Cough     Methotrexate      Sores     Methotrexate      Morphine Sulfate Itching     Mycophenolate Diarrhea     No Clinical Screening - See Comments      Simvastatin Muscle Pain (Myalgia)     severe     Cephalexin Rash     Fever and skin burning     Penicillin G Itching and Rash     Tolectin [Nsaids] Rash     Tramadol Rash           Condition and Physical on Discharge:      Discharge condition: Stable   Vitals: Blood pressure 121/56, pulse 74, temperature 98.7  F (37.1  C), temperature  "source Oral, resp. rate 16, height 1.715 m (5' 7.5\"), weight 80.7 kg (177 lb 14.4 oz), SpO2 96 %, not currently breastfeeding.  177 lbs 14.4 oz      GENERAL:  Comfortable.  PSYCH: pleasant, oriented, No acute distress.  HEART:  RRR  LUNGS: Normal Respiratory effort.   EXTREMITIES: able to ambulate independently.  SKIN:  Dry to touch, No rash, wound or ulcerations.  NEUROLOGIC:  Grossly intact    Dora Mcghee PA-C  "

## 2018-05-20 ENCOUNTER — TELEPHONE (OUTPATIENT)
Dept: FAMILY MEDICINE | Facility: CLINIC | Age: 68
End: 2018-05-20

## 2018-05-21 NOTE — TELEPHONE ENCOUNTER
Please call patient to see if she needs to follow up with me this week or if she needs to speak with a care coordinator. She can take any open slot. She can also set up an e-visit or phone visit if that is more convenient  Jennifer Barraza MD

## 2018-05-21 NOTE — TELEPHONE ENCOUNTER
Called, patient is scheduled for this coming Friday with Dr. Barraza.  Jose L Lopez,  For [facility]

## 2018-05-22 ENCOUNTER — OFFICE VISIT (OUTPATIENT)
Dept: DERMATOLOGY | Facility: CLINIC | Age: 68
End: 2018-05-22
Payer: MEDICARE

## 2018-05-22 ENCOUNTER — TELEPHONE (OUTPATIENT)
Dept: PSYCHIATRY | Facility: CLINIC | Age: 68
End: 2018-05-22

## 2018-05-22 DIAGNOSIS — D22.9 MULTIPLE NEVI: ICD-10-CM

## 2018-05-22 DIAGNOSIS — L81.4 LENTIGINES: ICD-10-CM

## 2018-05-22 DIAGNOSIS — L90.8 ATROPHIE BLANCHE: ICD-10-CM

## 2018-05-22 DIAGNOSIS — L30.9 DERMATITIS: ICD-10-CM

## 2018-05-22 DIAGNOSIS — D84.9 IMMUNOSUPPRESSION (H): ICD-10-CM

## 2018-05-22 DIAGNOSIS — K13.0 ANGULAR CHEILITIS: Primary | ICD-10-CM

## 2018-05-22 RX ORDER — KETOCONAZOLE 20 MG/G
CREAM TOPICAL 2 TIMES DAILY
Qty: 15 G | Refills: 0 | Status: SHIPPED | OUTPATIENT
Start: 2018-05-22 | End: 2019-07-24

## 2018-05-22 ASSESSMENT — PAIN SCALES - GENERAL: PAINLEVEL: NO PAIN (0)

## 2018-05-22 NOTE — LETTER
"5/22/2018       RE: Luz Thompson  [address]  [facility] [address]     Dear Colleague,    Thank you for referring your patient, Luz Thompson, to the [facility] DERMATOLOGY at [facility]. Please see a copy of my visit note below.    [facility] Dermatology Note    Last Visit: 5/23/2017    CC:   Chief Complaint   Patient presents with     Skin Check     [place] is here today for a skin check. [place] notes\" I have a couple of spots on my face I would like looked at\"        Encounter Date: May 22, 2018    Dermatology Problem List  1. S/p liver transplant for PBC (2002)   - current: sirolimus, prednisone   2. Pruritic papular skin eruption-self-induced, resolved  3. Mild atopic dermatitis- hands, thumbs, abdomen, back   - previous: Amlactin, patient discontinued   4. Actinic keratoses- right temple, left cheek, s/p LN2  5. Onychomycosis-  Oral voriconazole, prescribed by Dr. Montero (IM)   6. Drug hypersensitivity reaction- fluconazole  - residual lesions of abdomen and chest   7. Atrophe shanna    History of Present Illness  Luz Thompson is a 68 year old female with a [facility] liver transplant for PBC presenting today for a full body skin exam and atrophie shanna. The patient has no painful, bleeding, tender, or pruritic lesions. However, she has had a rash at the corners of her lips. Has tried to use neomycin without improvement. Has not needed to use the triamcinolone for dermatitis at site of atrophie shanna on her right dorsal foot. She has been in her usual state of health.      Detailed Review of Systems  ROS as in HPI.  Otherwise nml state of health. No other skin concerns.    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      Diverticulosis of sigmoid colon 12/21/2013     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     Received Rituxan during Summer of 2016     H/O esophageal varices      Hearing loss      Heart murmur 4/4/2012     History of DVT (deep vein thrombosis)      History of [facility] fever      History of thyroid cancer 9/25/2012     Hyperlipidemia 4/10/2012    Says that she does not have it anymore, not on meds     Hypertension goal BP (blood pressure) < 140/80 11/6/2013     Hypertriglyceridemia      Liver replaced by transplant (H) 10/17/2011    Dr. 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       Medications  Current Outpatient Prescriptions   Medication     acetaminophen 500 MG CAPS     calcitRIOL (ROCALTROL) 0.5 MCG capsule     clotrimazole (LOTRIMIN) 1 % cream     ELIQUIS 2.5 MG tablet     estradiol (ESTRACE VAGINAL) 0.1 MG/GM cream     ezetimibe (ZETIA) 10 MG tablet     folic acid (FOLVITE) 1 MG tablet     furosemide (LASIX) 20 MG tablet     hydrALAZINE (APRESOLINE) 25 MG tablet     Hypromellose (ARTIFICIAL TEARS OP)     levothyroxine (SYNTHROID) 150 MCG tablet     levothyroxine (SYNTHROID) 150 MCG tablet     meclizine (ANTIVERT) 25 MG tablet     metoprolol (TOPROL-XL) 50 MG 24 hr tablet     Multiple Vitamins-Minerals (PRESERVISION AREDS 2) CAPS     predniSONE (DELTASONE) 5 MG tablet     RAPAMUNE (BRAND) 0.5 MG PO TABLET     SUMAtriptan (IMITREX) " "50 MG tablet     triamcinolone (KENALOG) 0.1 % cream     ursodiol (ACTIGALL) 250 MG tablet     voriconazole (VFEND) 200 MG tablet     Wheat Dextrin (BENEFIBER) POWD     nitroFURantoin, macrocrystal-monohydrate, (MACROBID) 100 MG capsule     sertraline (ZOLOFT) 50 MG tablet     No current facility-administered medications for this visit.      Allergies  Allergies   Allergen Reactions     Fluconazole Hives and Itching     Ciprofloxacin Anxiety and Other (See Comments)     Irregular heart beat     Azithromycin Itching     Benadryl [Diphenhydramine Hcl]      Insomnia      Cellcept Diarrhea     Ciprofloxacin Other (See Comments)     Insomnia, mood lability     Codeine      Psych disturbance     Codeine      Diphenhydramine Other (See Comments)     Doxycycline      Lansoprazole Diarrhea     Levaquin [Levofloxacin] Other (See Comments)     Headache, hyperactivity     Lisinopril Cough     Methotrexate      Sores     Methotrexate      Morphine Sulfate Itching     Mycophenolate Diarrhea     No Clinical Screening - See Comments      Simvastatin Muscle Pain (Myalgia)     severe     Cephalexin Rash     Fever and skin burning     Penicillin G Itching and Rash     Tolectin [Nsaids] Rash     Tramadol Rash       Social History  Social History     Social History     Marital status: Legally      Spouse name: Robbin Thompson     Number of children: 4     Years of education: 20     Occupational History     [employer]  [employer]     social work      Self     Social History Main Topics     Smoking status: Former Smoker     Packs/day: 1.00     Years: 18.00     Types: Cigarettes     Quit date: 4/12/1985     Smokeless tobacco: Never Used     Alcohol use 0.0 oz/week     0 Standard drinks or equivalent per week      Comment: rare - \"I toast at weddings\"     Drug use: No     Sexual activity: Yes     Partners: Male     Birth control/ protection: Post-menopausal     Other Topics Concern     Exercise Yes     " cardio and strengthing     Social History Narrative    She is retired. She and her  travel around the [place] in an [place]. [place] in [place] the course of the [place]. 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   Intermittent use of sun protection.       Family History  Family History   Problem Relation Age of Onset     Hypertension Mother      Endometrial Cancer Mother      Hyperlipidemia Mother      Prostate Cancer Father      Macular Degeneration Father      Cancer - colorectal Maternal Grandmother      in her 80's, has surgery and removal of part of kidney,  at age 98     HEART DISEASE Maternal Grandfather       at 98     Glaucoma Maternal Grandfather      CEREBROVASCULAR DISEASE Paternal Grandmother      in her 80's     Hypertension Paternal Grandmother      HEART DISEASE Paternal Grandfather      MI     Alzheimer Disease Paternal Grandfather      Allergies Son      Neurologic Disorder Daughter      Migraines     Breast Cancer Other      Anesthesia Reaction No family hx of      Crohn Disease No family hx of      Ulcerative Colitis No family hx of    Maternal grandfather had skin cancer, but patient is not aware what kind       Physical Exam  There were no vitals taken for this visit.  GEN: NAD, alert and appropriately responsive, pleasant mood  SKIN: A full body examination of the face, scalp, ears, eyelids, lips, neck, chest, back, abdomen, hands, feet, upper and lower extremities and buttocks was performed. All was normal except the following:  -Scattered uniform light to medium brown 2-3 mm macules on face, trunk extremities  -Stuck on tan papules on torso  -Right dorsal foot with angulated white scars. Overlying this is an ill-defined, scaly pink plaque (KOH negative)  - no other lesions of concern were noted in areas examined     Assessment & Plan:  1. Immunosuppression, clinically normal nevi, SK,  and lentigines  Increased risk of skin cancer with history of liver transplant  - The nature of sun-induced photo-aging and skin cancers was discussed.  Sun avoidance, protective clothing, and the use of 30-SPF sunscreens was advised. Should reapply sunscreen every  minutes after sweating or swimming.     2. Atrophe shanna with overlying dermatitis  - Triamcinolone 0.1% BID prn for 1-2 weeks at a time    3. Angular chelitis  - Ketoconazole cream twice a day   - Vaseline barrier protection    Follow-up in 1 year    Patient was seen and discussed with Dr. Aidan Weiss MD  PGY5 Med-Derm  [phone]    The Patient was seen in Dermatology Clinic by MAVIS BERMUDEZ.  I performed the history, examination, and procedures noted above with the resident.  I have reviewed the history & exam as outlined in this note and made edits where appropriate. The assessment and plan were jointly made.    Mavis Bermudez MD, PhD    Dermatology      Again, thank you for allowing me to participate in the care of your patient.      Sincerely,    Mavis Bermudez MD

## 2018-05-22 NOTE — MR AVS SNAPSHOT
After Visit Summary   5/22/2018    Luz Thompson    MRN: [id number]           Patient Information     Date Of Birth          1950        Visit Information        Provider Department      5/22/2018 1:15 PM Mavis Bermudez MD [facility] Dermatology        Today's Diagnoses     Angular cheilitis    -  1    Immunosuppression (H)        Atrophie shanna        Dermatitis        Multiple nevi        Lentigines          Care Instructions    For the rash at the angles of your mouth (angular chelitis)  - Use ketoconazole cream twice a day until it resolves  - Use vaseline or aquaphor at corners of mouth at bedtime to prevent recurrence                Follow-ups after your visit        Follow-up notes from your care team     Return in about 1 year (around 5/22/2019).      Your next 10 appointments already scheduled     Jun 01, 2018 10:20 AM CDT   [facility]    [address] [facility] [id number] [phone]           Bring a current list of meds and any records pertaining to this visit. For Physicals, please bring immunization records and any forms needing to be filled out. Please arrive 10 minutes early to complete paperwork.            Jun 01, 2018 11:30 AM CDT   Return Visit with Randell Mejia MD   [facility] ([facility])    [address] [place] [id number] [phone]            Jun 01, 2018  1:30 PM CDT   MA SCREENING BILATERAL W/ DAVIDA with MGMA1, MG MA [facility] [place])    [id number] [place] [id number] [phone]           Three-dimensional (3D) mammograms are available at [facility] in [facility], [facility], [facility], [place], [place], and [place]. [facility] locations include [place] [facility] in  [place]. Benefits of 3D mammograms include: - Improved rate of cancer detection - Decreases your chance of having to go back for more tests, which means fewer: - \"False-positive\" results (This means that there is an abnormal area but it isn't cancer.) - Invasive testing procedures, such as a biopsy or surgery - Can provide clearer images of the breast if you have dense breast tissue. 3D mammography is an optional exam that anyone can have with a 2D mammogram. It doesn't replace or take the place of a 2D mammogram. 2D mammograms remain an effective screening test for all women.  Not all insurance companies cover the cost of a 3D mammogram. Check with your insurance.            Jun 20, 2018 12:00 PM CDT   CT CHEST W/O CONTRAST with UCCT2   [facility] ([facility])    [address]  1st Floor  [facility] [id number] [phone]           Please bring any scans or X-rays taken at other hospitals, if similar tests were done. Also bring a list of your medicines, including vitamins, minerals and over-the-counter drugs. It is safest to leave personal items at home.  Be sure to tell your doctor:   If you have any allergies.   If there s any chance you are pregnant.   If you are breastfeeding.  You do not need to do anything special to prepare for this exam.  Please wear loose clothing, such as a sweat suit or jogging clothes. Avoid snaps, zippers and other metal. We may ask you to undress and put on a hospital gown.            Jun 20, 2018  1:00 PM CDT   (Arrive by 12:45 PM)   Return Visit with Preet Villegas MD   [facility] ([facility])    [address]  [address]  [facility] [id number] [phone]            Jun 27, 2018 12:30 PM CDT   RETURN GENERAL with Dora Tovar MD   [facility] ([facility])    [address]  [address]  [facility] [address] "   [phone]            Jun 27, 2018  1:45 PM CDT   RETURN RETINA with Meri Frost MD   Eye Clinic ([facility])    [address]  [address] Clin [address]a  [facility] [address]   [phone]            Jun 29, 2018 11:30 AM CDT   (Arrive by 11:15 AM)   Return Visit with Britt Oswald NP   [facility] ([facility])    [address]  [address]  [facility] [id number] [phone]            Jul 10, 2018 10:45 AM CDT   Adult Med Follow UP with LIZ Nesbitt CNS   Psychiatry Clinic ([facility])    [place] [id number]  [address] [id number] [phone]              Who to contact     Please call your clinic at [phone] to:    Ask questions about your health    Make or cancel appointments    Discuss your medicines    Learn about your test results    Speak to your doctor            Additional Information About Your Visit        [employer] Information     [employer] gives you secure access to your electronic health record. If you see a primary care provider, you can also send messages to your care team and make appointments. If you have questions, please call your primary care clinic.  If you do not have a primary care provider, please call [phone] and they will assist you.      [employer] is an electronic gateway that provides easy, online access to your medical records. With [employer], you can request a clinic appointment, read your test results, renew a prescription or communicate with your care team.     To access your existing account, please contact your [facility] or call [phone] for assistance.        Care EveryWhere ID     This is your Care EveryWhere ID. This could be used by other organizations to access your [facility] medical records  [id number]         Blood Pressure from Last 3 Encounters:   05/19/18 110/56    05/15/18 167/74   05/02/18 115/66    Weight from Last 3 Encounters:   05/18/18 80.7 kg (177 lb 14.4 oz)   05/15/18 80.6 kg (177 lb 12.8 oz)   05/02/18 82.1 kg (181 lb)              We Performed the Following     Koh prep (Back Office)          Today's Medication Changes          These changes are accurate as of 5/22/18 11:59 PM.  If you have any questions, ask your nurse or doctor.               Start taking these medicines.        Dose/Directions    ketoconazole 2 % cream   Commonly known as:  NIZORAL   Used for:  Angular cheilitis   Started by:  Mavis Bermudez MD        Apply topically 2 times daily To angles of mouth   Quantity:  15 g   Refills:  0            Where to get your medicines      These medications were sent to [id number] - [place], [place] - [address] [address]  [address], [facility] [address]     Phone:  [phone]     ketoconazole 2 % cream                Primary Care Provider Office Phone # Fax #    Jennifer Amparo Barraza [phone]       [address]  [facility] [address]        Equal Access to Services     CARLA NOWAK AH: Hadii brayan guo hadasho Soomaali, waaxda luqadaha, qaybta kaalmada adeegyada, etta hardy. So [facility] [phone].    ATENCIÓN: Si habla español, tiene a jason disposición servicios gratuitos de asistencia lingüística. Llame al [phone].    We comply with applicable federal civil rights laws and [place] laws. We do not discriminate on the basis of race, color, national origin, age, disability, sex, sexual orientation, or gender identity.            Thank you!     Thank you for choosing [facility] DERMATOLOGY  for your care. Our goal is always to provide you with excellent care. Hearing back from our patients is one way we can continue to improve our services. Please take a few minutes to complete the written survey that you may receive in the mail after your visit with us. Thank you!              Your Updated Medication List - Protect others around you: Learn how to safely use, store and throw away your medicines at www.disposemymeds.org.          This list is accurate as of 5/22/18 11:59 PM.  Always use your most recent med list.                   Brand Name Dispense Instructions for use Diagnosis    acetaminophen 500 MG Caps      Take 1,000 mg by mouth nightly as needed Take 500-1,000 mg by mouth every 6 hours if needed.        ARTIFICIAL TEARS OP      Apply 1 drop to eye as needed        BENEFIBER Powd      2 teaspoons daily        clotrimazole 1 % cream    LOTRIMIN     Apply topically 2 times daily as needed Reported on 4/25/2017        ELIQUIS 2.5 MG tablet   Generic drug:  apixaban ANTICOAGULANT      Take 2.5 mg by mouth 2 times daily        estradiol 0.1 MG/GM cream    ESTRACE VAGINAL    42.5 g    Place 2 g vaginally twice a week    Atrophic vaginitis       ezetimibe 10 MG tablet    ZETIA    30 tablet    Take 10 mg by mouth every evening        folic acid 1 MG tablet    FOLVITE    100 tablet    Take 1 tablet (1 mg) by mouth daily    Liver replaced by transplant (H)       furosemide 20 MG tablet    LASIX    46 tablet    Take 0.5 tablets (10 mg) by mouth daily    CKD (chronic kidney disease) stage 3, GFR 30-59 ml/min, Liver replaced by transplant (H)       hydrALAZINE 25 MG tablet    APRESOLINE    270 tablet    Take 0.5 tablets (12.5 mg) by mouth 3 times daily as needed , if systolic blood pressure is more than 140 mmHg.    HTN (hypertension)       ketoconazole 2 % cream    NIZORAL    15 g    Apply topically 2 times daily To angles of mouth    Angular cheilitis       meclizine 25 MG tablet    ANTIVERT    30 tablet    Take 1 tablet (25 mg) by mouth as needed    Dizziness       metoprolol succinate 50 MG 24 hr tablet    TOPROL-XL    90 tablet    Take 1 tablet (50 mg) by mouth daily    Paroxysmal atrial fibrillation (H)       nitroFURantoin (macrocrystal-monohydrate) 100 MG capsule    MACROBID    14  capsule    Take 1 capsule (100 mg) by mouth 2 times daily For one week at onset of UTI symptoms    Urinary tract infection without hematuria, site unspecified       predniSONE 5 MG tablet    DELTASONE    90 tablet    Take 1 tablet (5 mg) by mouth daily    Thyroid cancer (H), Liver replaced by transplant (H)       PRESERVISION AREDS 2 Caps      Take 1 capsule by mouth 2 times daily        sertraline 50 MG tablet    ZOLOFT    30 tablet    Take 1 tablet (50 mg) by mouth daily    Adjustment disorder with depressed mood       sirolimus 0.5 MG Tabs tablet      Take 0.5 mg by mouth every other day        SUMAtriptan 50 MG tablet    IMITREX    30 tablet    Take 1 tablet (50 mg) by mouth at onset of headache for migraine repeat after 2 hours if needed.    Migraine without aura and without status migrainosus, not intractable       SYNTHROID 150 MCG tablet   Generic drug:  levothyroxine      Take 225 mcg by mouth on Mondays        triamcinolone 0.1 % cream    KENALOG     Apply topically 2 times daily as needed for irritation        ursodiol 250 MG tablet    ACTIGALL    180 tablet    Take 1 tablet (250 mg) by mouth 2 times daily    Liver replaced by transplant (H)       voriconazole 200 MG tablet    VFEND    60 tablet    Take 1 tablet (200 mg) by mouth 2 times daily    Coccidioidal pneumonitis (H)

## 2018-05-22 NOTE — PATIENT INSTRUCTIONS
For the rash at the angles of your mouth (angular chelitis)  - Use ketoconazole cream twice a day until it resolves  - Use vaseline or aquaphor at corners of mouth at bedtime to prevent recurrence

## 2018-05-22 NOTE — PROGRESS NOTES
"[facility] Dermatology Note    Last Visit: 5/23/2017    CC:   Chief Complaint   Patient presents with     Skin Check     [place] is here today for a skin check. [place] notes\" I have a couple of spots on my face I would like looked at\"        Encounter Date: May 22, 2018    Dermatology Problem List  1. S/p liver transplant for PBC (2002)   - current: sirolimus, prednisone   2. Pruritic papular skin eruption-self-induced, resolved  3. Mild atopic dermatitis- hands, thumbs, abdomen, back   - previous: Amlactin, patient discontinued   4. Actinic keratoses- right temple, left cheek, s/p LN2  5. Onychomycosis-  Oral voriconazole, prescribed by Dr. Montero (IM)   6. Drug hypersensitivity reaction- fluconazole  - residual lesions of abdomen and chest   7. Atrophe shanna    History of Present Illness  Luz Thompson is a 68 year old female with a [facility] liver transplant for PBC presenting today for a full body skin exam and atrophie shanna. The patient has no painful, bleeding, tender, or pruritic lesions. However, she has had a rash at the corners of her lips. Has tried to use neomycin without improvement. Has not needed to use the triamcinolone for dermatitis at site of atrophie shanna on her right dorsal foot. She has been in her usual state of health.      Detailed Review of Systems  ROS as in HPI.  Otherwise nml state of health. No other skin concerns.    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      Diverticulosis of sigmoid colon 12/21/2013     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     " Received Rituxan during Summer of 2016     H/O esophageal varices      Hearing loss      Heart murmur 4/4/2012     History of DVT (deep vein thrombosis)      History of [facility] fever      History of thyroid cancer 9/25/2012     Hyperlipidemia 4/10/2012    Says that she does not have it anymore, not on meds     Hypertension goal BP (blood pressure) < 140/80 11/6/2013     Hypertriglyceridemia      Liver replaced by transplant (H) 10/17/2011    Dr. 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       Medications  Current Outpatient Prescriptions   Medication     acetaminophen 500 MG CAPS     calcitRIOL (ROCALTROL) 0.5 MCG capsule     clotrimazole (LOTRIMIN) 1 % cream     ELIQUIS 2.5 MG tablet     estradiol (ESTRACE VAGINAL) 0.1 MG/GM cream     ezetimibe (ZETIA) 10 MG tablet     folic acid (FOLVITE) 1 MG tablet     furosemide (LASIX) 20 MG tablet     hydrALAZINE (APRESOLINE) 25 MG tablet     Hypromellose (ARTIFICIAL TEARS OP)     levothyroxine (SYNTHROID) 150 MCG tablet     levothyroxine (SYNTHROID) 150 MCG tablet     meclizine (ANTIVERT) 25 MG tablet     metoprolol (TOPROL-XL) 50 MG 24 hr tablet     Multiple Vitamins-Minerals (PRESERVISION AREDS 2) CAPS     predniSONE (DELTASONE) 5 MG tablet     RAPAMUNE (BRAND) 0.5 MG PO TABLET     SUMAtriptan (IMITREX) 50 MG tablet     triamcinolone (KENALOG) 0.1 % cream     ursodiol (ACTIGALL) 250 MG tablet     voriconazole (VFEND) 200 MG tablet     Wheat Dextrin (BENEFIBER) POWD     nitroFURantoin, macrocrystal-monohydrate, (MACROBID) 100 MG capsule     sertraline (ZOLOFT) 50 MG tablet     No  "current facility-administered medications for this visit.      Allergies  Allergies   Allergen Reactions     Fluconazole Hives and Itching     Ciprofloxacin Anxiety and Other (See Comments)     Irregular heart beat     Azithromycin Itching     Benadryl [Diphenhydramine Hcl]      Insomnia      Cellcept Diarrhea     Ciprofloxacin Other (See Comments)     Insomnia, mood lability     Codeine      Psych disturbance     Codeine      Diphenhydramine Other (See Comments)     Doxycycline      Lansoprazole Diarrhea     Levaquin [Levofloxacin] Other (See Comments)     Headache, hyperactivity     Lisinopril Cough     Methotrexate      Sores     Methotrexate      Morphine Sulfate Itching     Mycophenolate Diarrhea     No Clinical Screening - See Comments      Simvastatin Muscle Pain (Myalgia)     severe     Cephalexin Rash     Fever and skin burning     Penicillin G Itching and Rash     Tolectin [Nsaids] Rash     Tramadol Rash       Social History  Social History     Social History     Marital status: Legally      Spouse name: Robbin Thompson     Number of children: 4     Years of education: 20     Occupational History     [employer]     social work      Self     Social History Main Topics     Smoking status: Former Smoker     Packs/day: 1.00     Years: 18.00     Types: Cigarettes     Quit date: 4/12/1985     Smokeless tobacco: Never Used     Alcohol use 0.0 oz/week     0 Standard drinks or equivalent per week      Comment: rare - \"I toast at weddings\"     Drug use: No     Sexual activity: Yes     Partners: Male     Birth control/ protection: Post-menopausal     Other Topics Concern     Exercise Yes     cardio and strengthing     Social History Narrative    She is retired. She and her  travel around the [place] in an [place]. [place] in [place] the course of the [place]. [place] years [place] the [place] with hogs, " cows, corn and soybean crops.   Intermittent use of sun protection.       Family History  Family History   Problem Relation Age of Onset     Hypertension Mother      Endometrial Cancer Mother      Hyperlipidemia Mother      Prostate Cancer Father      Macular Degeneration Father      Cancer - colorectal Maternal Grandmother      in her 80's, has surgery and removal of part of kidney,  at age 98     HEART DISEASE Maternal Grandfather       at 98     Glaucoma Maternal Grandfather      CEREBROVASCULAR DISEASE Paternal Grandmother      in her 80's     Hypertension Paternal Grandmother      HEART DISEASE Paternal Grandfather      MI     Alzheimer Disease Paternal Grandfather      Allergies Son      Neurologic Disorder Daughter      Migraines     Breast Cancer Other      Anesthesia Reaction No family hx of      Crohn Disease No family hx of      Ulcerative Colitis No family hx of    Maternal grandfather had skin cancer, but patient is not aware what kind       Physical Exam  There were no vitals taken for this visit.  GEN: NAD, alert and appropriately responsive, pleasant mood  SKIN: A full body examination of the face, scalp, ears, eyelids, lips, neck, chest, back, abdomen, hands, feet, upper and lower extremities and buttocks was performed. All was normal except the following:  -Scattered uniform light to medium brown 2-3 mm macules on face, trunk extremities  -Stuck on tan papules on torso  -Right dorsal foot with angulated white scars. Overlying this is an ill-defined, scaly pink plaque (KOH negative)  - no other lesions of concern were noted in areas examined     Assessment & Plan:  1. Immunosuppression, clinically normal nevi, SK, and lentigines  Increased risk of skin cancer with history of liver transplant  - The nature of sun-induced photo-aging and skin cancers was discussed.  Sun avoidance, protective clothing, and the use of 30-SPF sunscreens was advised. Should reapply sunscreen every  minutes  after sweating or swimming.     2. Atrophe shanna with overlying dermatitis  - Triamcinolone 0.1% BID prn for 1-2 weeks at a time    3. Angular chelitis  - Ketoconazole cream twice a day   - Vaseline barrier protection    Follow-up in 1 year    Patient was seen and discussed with Dr. Aidan Weiss MD  PGY5 Med-Derm  [phone]    The Patient was seen in Dermatology Clinic by MAVIS BERMUDEZ.  I performed the history, examination, and procedures noted above with the resident.  I have reviewed the history & exam as outlined in this note and made edits where appropriate. The assessment and plan were jointly made.    Mavis Bermudez MD, PhD    Dermatology

## 2018-05-22 NOTE — NURSING NOTE
"Dermatology Rooming Note    Luz Thompson's goals for this visit include:   Chief Complaint   Patient presents with     Skin Check     [place] is here today for a skin check. [place] notes\" I have a couple of spots on my face I would like looked at\"      Celina Boss, RMMATILDE    "

## 2018-05-22 NOTE — TELEPHONE ENCOUNTER
On 5/4/2018 the patient signed a PHI authorizing phone messages to be left for her regarding scheduling and medical information.  The form also authorizes Person to Person communication with Gloria Caro/daughter for scheduling and medical information.  I sent this document to scanning on 5/22/2018 and kept a copy in Psychiatry until scanning is complete/confirmed. Alyson Saeed/BHARATHI

## 2018-05-23 DIAGNOSIS — E89.2 POSTABLATIVE HYPOPARATHYROIDISM (H): ICD-10-CM

## 2018-05-23 DIAGNOSIS — C73 MALIGNANT NEOPLASM OF THYROID GLAND (H): Primary | ICD-10-CM

## 2018-05-23 RX ORDER — LEVOTHYROXINE SODIUM 150 UG/1
TABLET ORAL
Qty: 120 TABLET | Refills: 0 | Status: SHIPPED | OUTPATIENT
Start: 2018-05-23 | End: 2018-08-24

## 2018-05-23 RX ORDER — CALCITRIOL 0.5 UG/1
0.5 CAPSULE, LIQUID FILLED ORAL DAILY
Qty: 90 CAPSULE | Refills: 0 | Status: SHIPPED | OUTPATIENT
Start: 2018-05-23 | End: 2018-08-24

## 2018-05-23 ASSESSMENT — PATIENT HEALTH QUESTIONNAIRE - PHQ9: SUM OF ALL RESPONSES TO PHQ QUESTIONS 1-9: 5

## 2018-05-24 LAB — GLUCOSE BLDC GLUCOMTR-MCNC: 135 MG/DL (ref 70–99)

## 2018-06-01 ENCOUNTER — RADIANT APPOINTMENT (OUTPATIENT)
Dept: MAMMOGRAPHY | Facility: CLINIC | Age: 68
End: 2018-06-01
Attending: FAMILY MEDICINE
Payer: MEDICARE

## 2018-06-01 ENCOUNTER — OFFICE VISIT (OUTPATIENT)
Dept: OTOLARYNGOLOGY | Facility: CLINIC | Age: 68
End: 2018-06-01
Payer: MEDICARE

## 2018-06-01 ENCOUNTER — OFFICE VISIT (OUTPATIENT)
Dept: FAMILY MEDICINE | Facility: CLINIC | Age: 68
End: 2018-06-01
Payer: MEDICARE

## 2018-06-01 VITALS
WEIGHT: 175 LBS | OXYGEN SATURATION: 99 % | BODY MASS INDEX: 26.52 KG/M2 | DIASTOLIC BLOOD PRESSURE: 69 MMHG | HEART RATE: 83 BPM | RESPIRATION RATE: 12 BRPM | SYSTOLIC BLOOD PRESSURE: 154 MMHG | HEIGHT: 68 IN

## 2018-06-01 VITALS
HEART RATE: 75 BPM | TEMPERATURE: 98.5 F | HEIGHT: 68 IN | SYSTOLIC BLOOD PRESSURE: 150 MMHG | OXYGEN SATURATION: 99 % | DIASTOLIC BLOOD PRESSURE: 82 MMHG | WEIGHT: 176 LBS | RESPIRATION RATE: 16 BRPM | BODY MASS INDEX: 26.67 KG/M2

## 2018-06-01 DIAGNOSIS — E78.5 HYPERLIPIDEMIA LDL GOAL <100: ICD-10-CM

## 2018-06-01 DIAGNOSIS — R20.9 DISTURBANCE OF SKIN SENSATION: Primary | ICD-10-CM

## 2018-06-01 DIAGNOSIS — Z94.4 LIVER REPLACED BY TRANSPLANT (H): ICD-10-CM

## 2018-06-01 DIAGNOSIS — H61.23 BILATERAL IMPACTED CERUMEN: ICD-10-CM

## 2018-06-01 DIAGNOSIS — Z12.31 VISIT FOR SCREENING MAMMOGRAM: ICD-10-CM

## 2018-06-01 DIAGNOSIS — H90.3 SENSORINEURAL HEARING LOSS (SNHL) OF BOTH EARS: Primary | ICD-10-CM

## 2018-06-01 DIAGNOSIS — Z78.0 ASYMPTOMATIC POSTMENOPAUSAL STATUS: ICD-10-CM

## 2018-06-01 DIAGNOSIS — F43.21 ADJUSTMENT DISORDER WITH DEPRESSED MOOD: ICD-10-CM

## 2018-06-01 DIAGNOSIS — M81.0 AGE-RELATED OSTEOPOROSIS WITHOUT CURRENT PATHOLOGICAL FRACTURE: ICD-10-CM

## 2018-06-01 PROBLEM — R51.9 HEADACHE: Status: RESOLVED | Noted: 2018-05-18 | Resolved: 2018-06-01

## 2018-06-01 PROCEDURE — 77063 BREAST TOMOSYNTHESIS BI: CPT

## 2018-06-01 PROCEDURE — 77067 SCR MAMMO BI INCL CAD: CPT

## 2018-06-01 PROCEDURE — 99214 OFFICE O/P EST MOD 30 MIN: CPT | Performed by: FAMILY MEDICINE

## 2018-06-01 PROCEDURE — 99214 OFFICE O/P EST MOD 30 MIN: CPT | Performed by: OTOLARYNGOLOGY

## 2018-06-01 ASSESSMENT — PAIN SCALES - GENERAL: PAINLEVEL: NO PAIN (0)

## 2018-06-01 NOTE — LETTER
My Depression Action Plan  Name: Luz Thompson   Date of Birth 1950  Date: 6/1/2018    My doctor: Jennifer Barraza   My clinic: [address] [id number] [phone]          GREEN    ZONE   Good Control    What it looks like:     Things are going generally well. You have normal up s and down s. You may even feel depressed from time to time, but bad moods usually last less than a day.   What you need to do:  1. Continue to care for yourself (see self care plan)  2. Check your depression survival kit and update it as needed  3. Follow your physician s recommendations including any medication.  4. Do not stop taking medication unless you consult with your physician first.           YELLOW         ZONE Getting Worse    What it looks like:     Depression is starting to interfere with your life.     It may be hard to get out of bed; you may be starting to isolate yourself from others.    Symptoms of depression are starting to last most all day and this has happened for several days.     You may have suicidal thoughts but they are not constant.   What you need to do:     1. Call your care team, your response to treatment will improve if you keep your care team informed of your progress. Yellow periods are signs an adjustment may need to be made.     2. Continue your self-care, even if you have to fake it!    3. Talk to someone in your support network    4. Open up your depression survival kit           RED    ZONE Medical Alert - Get Help    What it looks like:     Depression is seriously interfering with your life.     You may experience these or other symptoms: You can t get out of bed most days, can t work or engage in other necessary activities, you have trouble taking care of basic hygiene, or basic responsibilities, thoughts of suicide or death that will not go away, self-injurious behavior.     What you need to do:  1. Call your  care team and request a same-day appointment. If they are not available (weekends or after hours) call your local crisis line, emergency room or 911.            Depression Self Care Plan / Survival Kit    Self-Care for Depression  Here s the deal. Your body and mind are really not as separate as most people think.  What you do and think affects how you feel and how you feel influences what you do and think. This means if you do things that people who feel good do, it will help you feel better.  Sometimes this is all it takes.  There is also a place for medication and therapy depending on how severe your depression is, so be sure to consult with your medical provider and/ or Behavioral Health Consultant if your symptoms are worsening or not improving.     In order to better manage my stress, I will:    Exercise  Get some form of exercise, every day. This will help reduce pain and release endorphins, the  feel good  chemicals in your brain. This is almost as good as taking antidepressants!  This is not the same as joining a gym and then never going! (they count on that by the way ) It can be as simple as just going for a walk or doing some gardening, anything that will get you moving.      Hygiene   Maintain good hygiene (Get out of bed in the morning, Make your bed, Brush your teeth, Take a shower, and Get dressed like you were going to work, even if you are unemployed).  If your clothes don't fit try to get ones that do.    Diet  I will strive to eat foods that are good for me, drink plenty of water, and avoid excessive sugar, caffeine, alcohol, and other mood-altering substances.  Some foods that are helpful in depression are: complex carbohydrates, B vitamins, flaxseed, fish or fish oil, fresh fruits and vegetables.    Psychotherapy  I agree to participate in Individual Therapy (if recommended).    Medication  If prescribed medications, I agree to take them.  Missing doses can result in serious side effects.  I  understand that drinking alcohol, or other illicit drug use, may cause potential side effects.  I will not stop my medication abruptly without first discussing it with my provider.    Staying Connected With Others  I will stay in touch with my friends, family members, and my primary care provider/team.    Use your imagination  Be creative.  We all have a creative side; it doesn t matter if it s oil painting, sand castles, or mud pies! This will also kick up the endorphins.    Witness Beauty  (AKA stop and smell the roses) Take a look outside, even in mid-winter. Notice colors, textures. Watch the squirrels and birds.     Service to others  Be of service to others.  There is always someone else in need.  By helping others we can  get out of ourselves  and remember the really important things.  This also provides opportunities for practicing all the other parts of the program.    Humor  Laugh and be silly!  Adjust your TV habits for less news and crime-drama and more comedy.    Control your stress  Try breathing deep, massage therapy, biofeedback, and meditation. Find time to relax each day.     My support system    Clinic Contact:  Phone number:    Contact 1:  Phone number:    Contact 2:  Phone number:    Jewish/:  Phone number:    Therapist:  Phone number:    Local crisis center:    Phone number:    Other community support:  Phone number:

## 2018-06-01 NOTE — LETTER
6/1/2018         RE: Luz Thompson  [address]  [place] [address]        Dear Colleague,    Thank you for referring your patient, Luz Thompson, to the [facility]. Please see a copy of my visit note below.    History of Present Illness - Luz Thompson is a 68 year old female last seen 5/2/2017     To review, she has had LEFT ear surgery with a tympanoplasty in 1996.  Otherwise no chronic ear disease.  She has also had thyroid surgery and ablation in March 2010.  The main reason she is here is progressive bilateral ear fullness and blockage of her hearing aids with cerumen.    At the March 2014 visit, she had been through quite an ordeal, having been hospitalized for E. Coli sepsis, the source was unknown. No new issues with that problem since then. And after asking her about it, it happened again this past June of 2015.    Her past year has been fine, and other than fullness from her cerumen build up, no new ENT issues. No drainage from the ears, no bleeding, no vertigo. She still uses bilateral hearing aids.      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 , Disp: , Rfl:      calcitRIOL  (ROCALTROL) 0.5 MCG capsule, Take 1 capsule (0.5 mcg) by mouth daily, Disp: 90 capsule, Rfl: 0     clotrimazole (LOTRIMIN) 1 % cream, Apply topically 2 times daily as needed Reported on 4/25/2017, Disp: , Rfl:      ELIQUIS 2.5 MG tablet, Take 2.5 mg by mouth 2 times daily , Disp: , Rfl:      estradiol (ESTRACE VAGINAL) 0.1 MG/GM cream, Place 2 g vaginally twice a week, Disp: 42.5 g, Rfl: 11     ezetimibe (ZETIA) 10 MG tablet, Take 10 mg by mouth every evening , Disp: 30 tablet, Rfl: 0     folic acid (FOLVITE) 1 MG tablet, Take 1 tablet (1 mg) by mouth daily, Disp: 100 tablet, Rfl: 3     furosemide (LASIX) 20 MG tablet, Take 0.5 tablets (10 mg) by mouth daily, Disp: 46 tablet, Rfl: 3     hydrALAZINE (APRESOLINE) 25 MG tablet, Take 0.5 tablets (12.5 mg) by mouth 3 times daily as needed , if systolic blood pressure is more than 140 mmHg., Disp: 270 tablet, Rfl: 3     Hypromellose (ARTIFICIAL TEARS OP), Apply 1 drop to eye as needed, Disp: , Rfl:      ketoconazole (NIZORAL) 2 % cream, Apply topically 2 times daily To angles of mouth, Disp: 15 g, Rfl: 0     levothyroxine (SYNTHROID) 150 MCG tablet, Take 150mcg by mouth daily 6 days a week & 225mcg on Mondays, Disp: 120 tablet, Rfl: 0     levothyroxine (SYNTHROID) 150 MCG tablet, Take 225 mcg by mouth on Mondays, Disp: , Rfl:      meclizine (ANTIVERT) 25 MG tablet, Take 1 tablet (25 mg) by mouth as needed, Disp: 30 tablet, Rfl: 11     metoprolol (TOPROL-XL) 50 MG 24 hr tablet, Take 1 tablet (50 mg) by mouth daily, Disp: 90 tablet, Rfl: 3     Multiple Vitamins-Minerals (PRESERVISION AREDS 2) CAPS, Take 1 capsule by mouth 2 times daily, Disp: , Rfl:      nitroFURantoin, macrocrystal-monohydrate, (MACROBID) 100 MG capsule, Take 1 capsule (100 mg) by mouth 2 times daily For one week at onset of UTI symptoms, Disp: 14 capsule, Rfl: 6     predniSONE (DELTASONE) 5 MG tablet, Take 1 tablet (5 mg) by mouth daily, Disp: 90 tablet, Rfl: 3     RAPAMUNE (BRAND) 0.5 MG PO TABLET, Take  0.5 mg by mouth every other day, Disp: , Rfl:      sertraline (ZOLOFT) 50 MG tablet, Take 1 tablet (50 mg) by mouth daily, Disp: 30 tablet, Rfl: 0     SUMAtriptan (IMITREX) 50 MG tablet, Take 1 tablet (50 mg) by mouth at onset of headache for migraine repeat after 2 hours if needed., Disp: 30 tablet, Rfl: 3     triamcinolone (KENALOG) 0.1 % cream, Apply topically 2 times daily as needed for irritation, Disp: , Rfl:      ursodiol (ACTIGALL) 250 MG tablet, Take 1 tablet (250 mg) by mouth 2 times daily, Disp: 180 tablet, Rfl: 3     voriconazole (VFEND) 200 MG tablet, Take 1 tablet (200 mg) by mouth 2 times daily, Disp: 60 tablet, Rfl: 2     Wheat Dextrin (BENEFIBER) POWD, 2 teaspoons daily, Disp: , Rfl:     Allergies -   Allergies   Allergen Reactions     Fluconazole Hives and Itching     Ciprofloxacin Anxiety and Other (See Comments)     Irregular heart beat     Azithromycin Itching     Benadryl [Diphenhydramine Hcl]      Insomnia      Cellcept Diarrhea     Ciprofloxacin Other (See Comments)     Insomnia, mood lability     Codeine      Psych disturbance     Codeine      Diphenhydramine Other (See Comments)     Doxycycline      Lansoprazole Diarrhea     Levaquin [Levofloxacin] Other (See Comments)     Headache, hyperactivity     Lisinopril Cough     Methotrexate      Sores     Methotrexate      Morphine Sulfate Itching     Mycophenolate Diarrhea     No Clinical Screening - See Comments      Simvastatin Muscle Pain (Myalgia)     severe     Cephalexin Rash     Fever and skin burning     Penicillin G Itching and Rash     Tolectin [Nsaids] Rash     Tramadol Rash       Social History -   History     Social History     Marital Status:      Spouse Name: Robbin Thompson     Number of Children: 4     Years of Education: 20     Occupational History     [employer]  [employer]      Self     Social History Main Topics     Smoking status: Former Smoker -- 1.00 packs/day for 18 years     Types:  "Cigarettes     Quit date: 1985     Smokeless tobacco: Never Used     Alcohol Use: Yes      Comment: rare - \"I toast at weddings\"     Drug Use: No     Sexual Activity:     Partners: Male     Birth Control/ Protection: Post-menopausal     Other Topics Concern     Exercise Yes     cardio and strengthing     Social History Narrative    She is retired. She and her  are traveling around the [place] in an RV. They are planning to be in [place] for the winter.       Family History -   Family History   Problem Relation Age of Onset     Hypertension Mother      Endometrial Cancer Mother      Hyperlipidemia Mother      Prostate Cancer Father      Macular Degeneration Father      Cancer - colorectal Maternal Grandmother      in her 80's, has surgery and removal of part of kidney,  at age 98     HEART DISEASE Maternal Grandfather       at 98     Glaucoma Maternal Grandfather      CEREBROVASCULAR DISEASE Paternal Grandmother      in her 80's     Hypertension Paternal Grandmother      HEART DISEASE Paternal Grandfather      MI     Alzheimer Disease Paternal Grandfather      Allergies Son      Neurologic Disorder Daughter      Migraines     Breast Cancer Other      Anesthesia Reaction No family hx of      Crohn Disease No family hx of      Ulcerative Colitis No family hx of        Review of Systems - As per HPI and PMHx, otherwise 7 system review of the head and neck negative.    Physical Exam  /69  Pulse 83  Resp 12  Ht 1.715 m (5' 7.5\")  Wt 79.4 kg (175 lb)  LMP 1988 (Approximate)  SpO2 99%  BMI 27 kg/m2    General - The patient is well nourished and well developed, and appears to have good nutritional status.  Alert and oriented to person and place, answers questions and cooperates with examination appropriately.   Head and Face - Normocephalic and atraumatic, with no gross asymmetry noted of the contour of the facial features.  The facial nerve is intact, with strong symmetric " movements.  Voice and Breathing - The patient was breathing comfortably without the use of accessory muscles. There was no wheezing, stridor, or stertor.  The patients voice was clear and strong, and had appropriate pitch and quality.  Eyes - Extraocular movements intact, and the pupils were reactive to light.  Sclera were not icteric or injected, conjunctiva were pink and moist.  Mouth - Examination of the oral cavity showed pink, healthy oral mucosa. No lesions or ulcerations noted.  The tongue was mobile and midline, and the dentition were in good condition.    Throat - The walls of the oropharynx were smooth, pink, moist, symmetric, and had no lesions or ulcerations.  The tonsillar pillars and soft palate were symmetric.  The uvula was midline on elevation.    Ears - The tympanic membranes are normal in appearance, bony landmarks are intact.  No retraction, perforation, or masses.  Normal mobility on valsalva maneuver, with no reports of dizziness on insuflation.  No fluid or purulence was seen in the external canal or the middle ear. No evidence of infection of the middle ear or external canal, cerumen was normal in appearance.        A/P - Luz Thompson is a 68 year old female  (H90.5) SNHL (sensorineural hearing loss)  (primary encounter diagnosis)  (H61.23) Bilateral impacted cerumen    The patient had no cerumen bilaterally to my surprise.  Follow up with me in another year, sooner if there are any new ear issues.          Again, thank you for allowing me to participate in the care of your patient.        Sincerely,        Randell Mejia MD

## 2018-06-01 NOTE — PATIENT INSTRUCTIONS
Scheduling Information  To schedule your CT/MRI scan, please contact [facility] at [phone] OR [facility] at [phone]    To schedule your Surgery, please contact our Specialty Schedulers at [phone]      ENT Clinic Locations Clinic Hours Telephone Number     [facility]  [address] [place] [address]   Monday:           1:00pm -- 5:00pm    Friday:              8:00am - 12:00pm   To schedule/reschedule an appointment with   Dr. Mejia,   please contact our   Specialty Scheduling Department at:     [phone]       [facility]  [address] [place] [address] Tuesday:          8:00am -- 2:00pm         Urgent Care Locations Clinic Hours Telephone Numbers     [facility]  [address] [place] [address]     Monday-Friday:     11:00am - 9:00pm    Saturday-Sunday:  9:00am - 5:00pm   [phone]     [facility]  [address] [place] [address]     Monday-Friday:      5:00pm - 9:00pm     Saturday-Sunday:  9:00am - 5:00pm   [phone]

## 2018-06-01 NOTE — PROGRESS NOTES
SUBJECTIVE:   Luz Thompson is a 68 year old female who presents to clinic today for the following health issues:          Hospital Follow-up Visit:    Hospital/Nursing Home/IP Rehab Facility: [facility]  Date of Admission: 5/18/2018  Date of Discharge: 5/19/2018  Reason(s) for Admission: Complaints of left sided headache, left sided facial and perioral paresthesias concerning for TIA. Work up negative, still having episodes of feeling burning pain in different parts of her body for 15 -60 minutes, then they go away. Not like a hot flash.             Problems taking medications regularly:  None       Medication changes since discharge: None       Problems adhering to non-medication therapy:  None  Still getting a burning sensation all over that lasts for 15 minutes to an hour  Summary of hospitalization:  [facility] discharge summary reviewed  Diagnostic Tests/Treatments reviewed.  Follow up needed: none  Other Healthcare Providers Involved in Patient s Care:         None  Update since discharge: improved.     Post Discharge Medication Reconciliation: discharge medications reconciled, continue medications without change.  Plan of care communicated with patient     Coding guidelines for this visit:  Type of Medical   Decision Making Face-to-Face Visit       within 7 Days of discharge Face-to-Face Visit        within 14 days of discharge   Moderate Complexity [id number]   High Complexity [id number]              Hyperlipidemia Follow-Up      Rate your low fat/cholesterol diet?: good    Taking statin?  No    Other lipid medications/supplements?:  Zetia, without side effects    Liver transplant and doing very well with this.     Had a bowel perforation with colonoscopy last year. Found diverticulosis but no active infection. Symptoms are improving.       from her and they are filing for a divorce. He has moved in with another woman already.     Problem list and histories  reviewed & adjusted, as indicated.  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     Prophylactic antibiotic     High risk medication use     Statin intolerance     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     Coccidioidal pneumonitis (H)     SNHL (sensorineural hearing loss)     Abnormal liver function tests     Diagnostic skin and sensitization tests     Perforation bowel (H)     Liver replaced by transplant (H)     Hyperlipidemia LDL goal <100     Past Surgical History:   Procedure Laterality Date     APPENDECTOMY  1961     BIOPSY       CATARACT IOL, RT/LT      RE12/19/2013, LE12/10/2013 - Toric lenses     CHOLECYSTECTOMY  1991     COLONOSCOPY  3/10/2014    Procedure: COLONOSCOPY;;  Surgeon: Gentry Ramirez MD;  Location: [facility]     ear drum repair       ENDOBRONCHIAL ULTRASOUND FLEXIBLE N/A 9/29/2017    Procedure: ENDOBRONCHIAL ULTRASOUND FLEXIBLE;  Flexible Bronchoscopy, Endobronchial Ultrasound, Transbronchial Needle Aspiration ;  Surgeon: Eden Clinton MD;  Location: [facility]     ENDOSCOPIC RETROGRADE CHOLANGIOPANCREATOGRAM  9/19/2013    Procedure: ENDOSCOPIC RETROGRADE CHOLANGIOPANCREATOGRAM;  Endoscopic Retrograde Cholangiopancreatogram with single balloon enteroscopy, ballon sweep of bile duct;  Surgeon: Brett Membreno MD;  Location: [facility] OR      KNEE SCOPE,MED/LAT MENISECTOMY  8/10/12    Right, partial medial menisectomy only     KNEE SURGERY  1966    R knee     PICC INSERTION  9/18/2013    4fr SL PASV PICC, 40cm (1cm external) in the R basilic vein w/ tip in the low SVC     PICC INSERTION  2/21/2014    5 fr DL BioFlo Navilyst PICC, 46 cm (3 cm external) in the L basilic vein w/ tip in the SVC RA junction.     THYROIDECTOMY  3/2010     TRANSPLANT LIVER RECIPIENT LIVING  "UNRELATED         Social History   Substance Use Topics     Smoking status: Former Smoker     Packs/day: 1.00     Years: 18.00     Types: Cigarettes     Quit date: 1985     Smokeless tobacco: Never Used     Alcohol use 0.0 oz/week     0 Standard drinks or equivalent per week      Comment: rare - \"I toast at weddings\"     Family History   Problem Relation Age of Onset     Hypertension Mother      Endometrial Cancer Mother      Hyperlipidemia Mother      Prostate Cancer Father      Macular Degeneration Father      Cancer - colorectal Maternal Grandmother      in her 80's, has surgery and removal of part of kidney,  at age 98     HEART DISEASE Maternal Grandfather       at 98     Glaucoma Maternal Grandfather      CEREBROVASCULAR DISEASE Paternal Grandmother      in her 80's     Hypertension Paternal Grandmother      HEART DISEASE Paternal Grandfather      MI     Alzheimer Disease Paternal Grandfather      Allergies Son      Neurologic Disorder Daughter      Migraines     Breast Cancer Other      Anesthesia Reaction No family hx of      Crohn Disease No family hx of      Ulcerative Colitis No family hx of          Current Outpatient Prescriptions   Medication Sig Dispense Refill     acetaminophen 500 MG CAPS Take 1,000 mg by mouth nightly as needed Take 500-1,000 mg by mouth every 6 hours if needed.        calcitRIOL (ROCALTROL) 0.5 MCG capsule Take 1 capsule (0.5 mcg) by mouth daily 90 capsule 0     clotrimazole (LOTRIMIN) 1 % cream Apply topically 2 times daily as needed Reported on 2017       ELIQUIS 2.5 MG tablet Take 2.5 mg by mouth 2 times daily        estradiol (ESTRACE VAGINAL) 0.1 MG/GM cream Place 2 g vaginally twice a week 42.5 g 11     ezetimibe (ZETIA) 10 MG tablet Take 10 mg by mouth every evening  30 tablet 0     folic acid (FOLVITE) 1 MG tablet Take 1 tablet (1 mg) by mouth daily 100 tablet 3     furosemide (LASIX) 20 MG tablet Take 0.5 tablets (10 mg) by mouth daily 46 tablet 3 "     hydrALAZINE (APRESOLINE) 25 MG tablet Take 0.5 tablets (12.5 mg) by mouth 3 times daily as needed , if systolic blood pressure is more than 140 mmHg. 270 tablet 3     Hypromellose (ARTIFICIAL TEARS OP) Apply 1 drop to eye as needed       ketoconazole (NIZORAL) 2 % cream Apply topically 2 times daily To angles of mouth 15 g 0     levothyroxine (SYNTHROID) 150 MCG tablet Take 150mcg by mouth daily 6 days a week & 225mcg on Mondays 120 tablet 0     meclizine (ANTIVERT) 25 MG tablet Take 1 tablet (25 mg) by mouth as needed 30 tablet 11     metoprolol (TOPROL-XL) 50 MG 24 hr tablet Take 1 tablet (50 mg) by mouth daily 90 tablet 3     Multiple Vitamins-Minerals (PRESERVISION AREDS 2) CAPS Take 1 capsule by mouth 2 times daily       nitroFURantoin, macrocrystal-monohydrate, (MACROBID) 100 MG capsule Take 1 capsule (100 mg) by mouth 2 times daily For one week at onset of UTI symptoms 14 capsule 6     predniSONE (DELTASONE) 5 MG tablet Take 1 tablet (5 mg) by mouth daily 90 tablet 3     RAPAMUNE (BRAND) 0.5 MG PO TABLET Take 0.5 mg by mouth every other day       sertraline (ZOLOFT) 50 MG tablet Take 1 tablet (50 mg) by mouth daily 30 tablet 0     SUMAtriptan (IMITREX) 50 MG tablet Take 1 tablet (50 mg) by mouth at onset of headache for migraine repeat after 2 hours if needed. 30 tablet 3     triamcinolone (KENALOG) 0.1 % cream Apply topically 2 times daily as needed for irritation       ursodiol (ACTIGALL) 250 MG tablet Take 1 tablet (250 mg) by mouth 2 times daily 180 tablet 3     voriconazole (VFEND) 200 MG tablet Take 1 tablet (200 mg) by mouth 2 times daily 60 tablet 2     Wheat Dextrin (BENEFIBER) POWD 2 teaspoons daily       levothyroxine (SYNTHROID) 150 MCG tablet Take 225 mcg by mouth on Mondays       Allergies   Allergen Reactions     Fluconazole Hives and Itching     Ciprofloxacin Anxiety and Other (See Comments)     Irregular heart beat     Azithromycin Itching     Benadryl [Diphenhydramine Hcl]      Insomnia       Cellcept Diarrhea     Ciprofloxacin Other (See Comments)     Insomnia, mood lability     Codeine      Psych disturbance     Codeine      Diphenhydramine Other (See Comments)     Doxycycline      Lansoprazole Diarrhea     Levaquin [Levofloxacin] Other (See Comments)     Headache, hyperactivity     Lisinopril Cough     Methotrexate      Sores     Methotrexate      Morphine Sulfate Itching     Mycophenolate Diarrhea     No Clinical Screening - See Comments      Simvastatin Muscle Pain (Myalgia)     severe     Cephalexin Rash     Fever and skin burning     Penicillin G Itching and Rash     Tolectin [Nsaids] Rash     Tramadol Rash     Recent Labs   Lab Test  05/19/18   0629  05/18/18   0731  05/14/18   1019  04/17/18   0942  10/23/17   1024  10/05/17   0907   08/22/17   0905  07/13/17   0843  05/10/17   0929   A1C   --   6.8*   --    --    --    --    --    --    --    --    LDL   --   Cannot estimate LDL when triglyceride exceeds 400 mg/dL  150*   --    --    --   Cannot estimate LDL when triglyceride exceeds 400 mg/dL  135*   --   Cannot estimate LDL when triglyceride exceeds 400 mg/dL  174*   --   Cannot estimate LDL when triglyceride exceeds 400 mg/dL   Above desirable:  100-129 mg/dl   Borderline High:  130-159 mg/dL   High:             160-189 mg/dL   Very high:       >189 mg/dl  CORRECTED ON 05/15 AT 0844: PREVIOUSLY REPORTED AS Cannot estimate LDL when   triglyceride exceeds 400 mg/dL    198*   HDL   --   49*   --    --    --   50   --   49*   --   48*   TRIG   --   557*   --    --    --   419*   --   447*   --   448*   ALT   --    --   43  42  47  33   < >  107*  105*  56*   CR  1.29*  1.22*  1.40*  1.74*   --   1.38*   < >  1.29*  1.30*  1.53*   GFRESTIMATED  41*  44*  37*  29*   --   38*   < >  41*  41*  34*   GFRESTBLACK  50*  53*  45*  35*   --   46*   < >  50*  49*  41*   POTASSIUM  4.1  3.6  3.3*  3.9   --   4.0   < >  3.6  3.6  4.2   TSH   --    --    --    --    --    --    --    --   3.66  4.74*  "   < > = values in this interval not displayed.      BP Readings from Last 3 Encounters:   06/01/18 154/69   06/01/18 150/82   05/19/18 110/56    Wt Readings from Last 3 Encounters:   06/01/18 175 lb (79.4 kg)   06/01/18 176 lb (79.8 kg)   05/18/18 177 lb 14.4 oz (80.7 kg)                  Labs reviewed in EPIC    Reviewed and updated as needed this visit by clinical staff       Reviewed and updated as needed this visit by Provider         ROS:  Constitutional, HEENT, cardiovascular, pulmonary, gi and gu systems are negative, except as otherwise noted.    OBJECTIVE:     /82 (BP Location: Right arm, Patient Position: Chair, Cuff Size: Adult Regular)  Pulse 75  Temp 98.5  F (36.9  C) (Oral)  Resp 16  Ht 5' 7.5\" (1.715 m)  Wt 176 lb (79.8 kg)  LMP 06/01/1988 (Approximate)  SpO2 99%  BMI 27.16 kg/m2  Body mass index is 27.16 kg/(m^2).  GENERAL APPEARANCE: healthy, alert and no distress  EYES: Eyes grossly normal to inspection, PERRL and conjunctivae and sclerae normal  HENT: ear canals and TM's normal, nose and mouth without ulcers or lesions, oropharynx clear and oral mucous membranes moist  NECK: no adenopathy, no asymmetry, masses, or scars and thyroid normal to palpation  RESP: lungs clear to auscultation - no rales, rhonchi or wheezes  CV: regular rates and rhythm, normal S1 S2, no S3 or S4, no murmur, click or rub, no peripheral edema and peripheral pulses strong  ABDOMEN: soft, nontender, no hepatosplenomegaly, no masses and bowel sounds normal  MS: no musculoskeletal defects are noted and gait is age appropriate without ataxia  SKIN: no suspicious lesions or rashes  NEURO: Normal strength and tone, sensory exam grossly normal, mentation intact and speech normal  PSYCH: mentation appears normal and affect normal/bright     Diagnostic Test Results:  No results found. However, due to the size of the patient record, not all encounters were searched. Please check Results Review for a complete set of " "results.    ASSESSMENT/PLAN:         Tobacco Cessation:   reports that she quit smoking about 33 years ago. Her smoking use included Cigarettes. She has a 18.00 pack-year smoking history. She has never used smokeless tobacco.      BMI:   Estimated body mass index is 27.16 kg/(m^2) as calculated from the following:    Height as of this encounter: 5' 7.5\" (1.715 m).    Weight as of this encounter: 176 lb (79.8 kg).           ICD-10-CM    1. Disturbance of skin sensation R20.9 No stroke or TIA found. Felt to be paresthesia and is still having these sensations. Improving.    2. Asymptomatic postmenopausal status Z78.0 DEXA HIP/PELVIS/SPINE - Future   3. Hyperlipidemia LDL goal <100 E78.5 Lipid panel reflex to direct LDL Fasting- future lab order.    4. Liver replaced by transplant (H) Z94.4 10 year anniversary and doing well.   5. Age-related osteoporosis without current pathological fracture M81.0 History of osteoporosis.    6. Adjustment disorder with depressed mood F43.21 Going through a divorce with her . Unexpected and not harmonious. Doing better on Zoloft. Staying with her daughter for now. Looking for an apartment.        CONSULTATION/REFERRAL to gastroenterology for follow up as scheduled. Psychiatry follow up for antidepressant management.   FUTURE LABS:       - Schedule fasting labs in 3 months  FUTURE APPOINTMENTS:       - Follow-up visit in 6 months or sooner if any questions or concerns.   See Patient Instructions    Jennifer Barraza MD  [facility]    "

## 2018-06-01 NOTE — MR AVS SNAPSHOT
After Visit Summary   6/1/2018    Luz Thompson    MRN: [id number]           Patient Information     Date Of Birth          1950        Visit Information        Provider Department      6/1/2018 11:30 AM Randell Mejia MD [facility]        Today's Diagnoses     Sensorineural hearing loss (SNHL) of both ears    -  1    Bilateral impacted cerumen          Care Instructions    Scheduling Information  To schedule your CT/MRI scan, please contact [facility] at [phone] OR [facility] at [phone]    To schedule your Surgery, please contact our Specialty Schedulers at [phone]      ENT Clinic Locations Clinic Hours Telephone Number     [facility]  [address] [place] [address]   Monday:           1:00pm -- 5:00pm    Friday:              8:00am - 12:00pm   To schedule/reschedule an appointment with   Dr. Mejia,   please contact our   Specialty Scheduling Department at:     [phone]       [facility]  [address] [place] [address] Tuesday:          8:00am -- 2:00pm         Urgent Care Locations Clinic Hours Telephone Numbers     [facility]  [address] [place] [address]     Monday-Friday:     11:00am - 9:00pm    Saturday-Sunday:  9:00am - 5:00pm   [phone]     [facility]  [address] [place] [address]. [place] [address]     Monday-Friday:      5:00pm - 9:00pm     Saturday-Sunday:  9:00am - 5:00pm   [phone]                 Follow-ups after your visit        Your next 10 appointments already scheduled     Jun 01, 2018  1:30 PM CDT   MA SCREENING BILATERAL W/ DAVIDA with MGMA1, MG MA TECH   [facility] ([facility])    [address] [place] [address] [phone]           Three-dimensional (3D) mammograms are available at [facility] in [facility], [facility], [facility], [facility], and [place].  "[facility] locations include [facility] in [place]. Benefits of 3D mammograms include: - Improved rate of cancer detection - Decreases your chance of having to go back for more tests, which means fewer: - \"False-positive\" results (This means that there is an abnormal area but it isn't cancer.) - Invasive testing procedures, such as a biopsy or surgery - Can provide clearer images of the breast if you have dense breast tissue. 3D mammography is an optional exam that anyone can have with a 2D mammogram. It doesn't replace or take the place of a 2D mammogram. 2D mammograms remain an effective screening test for all women.  Not all insurance companies cover the cost of a 3D mammogram. Check with your insurance.            Jun 20, 2018 12:00 PM CDT   CT CHEST W/O CONTRAST with UCCT2   [facility] CT ([facility])    [address]  1st Floor  [facility] [id number] [phone]           Please bring any scans or X-rays taken at other hospitals, if similar tests were done. Also bring a list of your medicines, including vitamins, minerals and over-the-counter drugs. It is safest to leave personal items at home.  Be sure to tell your doctor:   If you have any allergies.   If there s any chance you are pregnant.   If you are breastfeeding.  You do not need to do anything special to prepare for this exam.  Please wear loose clothing, such as a sweat suit or jogging clothes. Avoid snaps, zippers and other metal. We may ask you to undress and put on a hospital gown.            Jun 20, 2018  1:00 PM CDT   (Arrive by 12:45 PM)   Return Visit with Preet Villegas MD   [facility] ([facility])    [address]  [address]  [facility] [address] [phone]            Jun 27, 2018 12:30 PM CDT   RETURN GENERAL with Dora Tovar MD   [facility] Clinic ([facility])    [facility] " Building  [address]  9th [address] Clin [address]a  [facility] [address]   [phone]            Jun 27, 2018  1:45 PM CDT   RETURN RETINA with Meri Frost MD   Eye Clinic ([facility])    [facility]  [address]  9th [address] Clin 9a  [facility] [address]   [phone]            Jun 29, 2018 11:30 AM CDT   (Arrive by 11:15 AM)   Return Visit with Britt Oswald NP   [facility] ([facility])    [address]  [address]  [facility] [address]   [phone]            Jul 10, 2018 10:45 AM CDT   Adult Med Follow UP with LIZ Nesbitt CNS   Psychiatry Clinic ([facility])    [facility]  [address] [facility] [address]  [address] [address]   [phone]            Jul 25, 2018  9:30 AM CDT   (Arrive by 9:15 AM)   Return Visit with Crystal Montero MD   [facility] ([facility])    [address]  [address]  [facility] [address]   [phone]            Aug 06, 2018  9:00 AM CDT   (Arrive by 8:45 AM)   RETURN ENDOCRINE with Rach Vasquez MD   [facility] Endocrinology ([facility])    [address]  3rd Floor  [facility] [address] [phone]              Future tests that were ordered for you today     Open Future Orders        Priority Expected Expires Ordered    Lipid panel reflex to direct LDL Fasting Routine 6/1/2018 6/1/2019 6/1/2018    DEXA HIP/PELVIS/SPINE - Future Routine  6/1/2019 6/1/2018            Who to contact     If you have questions or need follow up information about today's clinic visit or your schedule please contact [facility] directly at [phone].  Normal or non-critical lab and imaging results will be communicated to you by MyChart, letter or phone within 4 business days after the clinic has received the results. If you do  "not hear from us within 7 days, please contact the clinic through [employer] or phone. If you have a critical or abnormal lab result, we will notify you by phone as soon as possible.  Submit refill requests through [employer] or call your pharmacy and they will forward the refill request to us. Please allow 3 business days for your refill to be completed.          Additional Information About Your Visit        [employer] Information     [employer] gives you secure access to your electronic health record. If you see a primary care provider, you can also send messages to your care team and make appointments. If you have questions, please call your primary care clinic.  If you do not have a primary care provider, please call [phone] and they will assist you.        Care EveryWhere ID     This is your Care EveryWhere ID. This could be used by other organizations to access your [place] medical records  [id number]        Your Vitals Were     Pulse Respirations Height Last Period Pulse Oximetry BMI (Body Mass Index)    83 12 1.715 m (5' 7.5\") 06/01/1988 (Approximate) 99% 27 kg/m2       Blood Pressure from Last 3 Encounters:   06/01/18 154/69   06/01/18 150/82   05/19/18 110/56    Weight from Last 3 Encounters:   06/01/18 79.4 kg (175 lb)   06/01/18 79.8 kg (176 lb)   05/18/18 80.7 kg (177 lb 14.4 oz)              Today, you had the following     No orders found for display       Primary Care Provider Office Phone # Fax #    Jennifer Amparo Barraza [phone]       [address]  [facility] [address]        Equal Access to Services     [facility]: Hadii brayan guo hadparmjit Sokelsi, waaxda luqadaha, qaybta kaalmaetta castaneda. So [facility] [phone].    ATENCIÓN: Si habla español, tiene a jason disposición servicios gratuitos de asistencia lingüística. Llame al [phone].    We comply with applicable federal civil rights laws and [place] laws. We do not discriminate on " the basis of race, color, national origin, age, disability, sex, sexual orientation, or gender identity.            Thank you!     Thank you for choosing [facility]  for your care. Our goal is always to provide you with excellent care. Hearing back from our patients is one way we can continue to improve our services. Please take a few minutes to complete the written survey that you may receive in the mail after your visit with us. Thank you!             Your Updated Medication List - Protect others around you: Learn how to safely use, store and throw away your medicines at www.disposemymeds.org.          This list is accurate as of 6/1/18 12:02 PM.  Always use your most recent med list.                   Brand Name Dispense Instructions for use Diagnosis    acetaminophen 500 MG Caps      Take 1,000 mg by mouth nightly as needed Take 500-1,000 mg by mouth every 6 hours if needed.        ARTIFICIAL TEARS OP      Apply 1 drop to eye as needed        BENEFIBER Powd      2 teaspoons daily        calcitRIOL 0.5 MCG capsule    ROCALTROL    90 capsule    Take 1 capsule (0.5 mcg) by mouth daily    Postablative hypoparathyroidism (H)       clotrimazole 1 % cream    LOTRIMIN     Apply topically 2 times daily as needed Reported on 4/25/2017        ELIQUIS 2.5 MG tablet   Generic drug:  apixaban ANTICOAGULANT      Take 2.5 mg by mouth 2 times daily        estradiol 0.1 MG/GM cream    ESTRACE VAGINAL    42.5 g    Place 2 g vaginally twice a week    Atrophic vaginitis       ezetimibe 10 MG tablet    ZETIA    30 tablet    Take 10 mg by mouth every evening        folic acid 1 MG tablet    FOLVITE    100 tablet    Take 1 tablet (1 mg) by mouth daily    Liver replaced by transplant (H)       furosemide 20 MG tablet    LASIX    46 tablet    Take 0.5 tablets (10 mg) by mouth daily    CKD (chronic kidney disease) stage 3, GFR 30-59 ml/min, Liver replaced by transplant (H)       hydrALAZINE 25 MG tablet    APRESOLINE    270  tablet    Take 0.5 tablets (12.5 mg) by mouth 3 times daily as needed , if systolic blood pressure is more than 140 mmHg.    HTN (hypertension)       ketoconazole 2 % cream    NIZORAL    15 g    Apply topically 2 times daily To angles of mouth    Angular cheilitis       meclizine 25 MG tablet    ANTIVERT    30 tablet    Take 1 tablet (25 mg) by mouth as needed    Dizziness       metoprolol succinate 50 MG 24 hr tablet    TOPROL-XL    90 tablet    Take 1 tablet (50 mg) by mouth daily    Paroxysmal atrial fibrillation (H)       nitroFURantoin (macrocrystal-monohydrate) 100 MG capsule    MACROBID    14 capsule    Take 1 capsule (100 mg) by mouth 2 times daily For one week at onset of UTI symptoms    Urinary tract infection without hematuria, site unspecified       predniSONE 5 MG tablet    DELTASONE    90 tablet    Take 1 tablet (5 mg) by mouth daily    Thyroid cancer (H), Liver replaced by transplant (H)       PRESERVISION AREDS 2 Caps      Take 1 capsule by mouth 2 times daily        sertraline 50 MG tablet    ZOLOFT    30 tablet    Take 1 tablet (50 mg) by mouth daily    Adjustment disorder with depressed mood       sirolimus 0.5 MG Tabs tablet      Take 0.5 mg by mouth every other day        SUMAtriptan 50 MG tablet    IMITREX    30 tablet    Take 1 tablet (50 mg) by mouth at onset of headache for migraine repeat after 2 hours if needed.    Migraine without aura and without status migrainosus, not intractable       * SYNTHROID 150 MCG tablet   Generic drug:  levothyroxine      Take 225 mcg by mouth on Mondays        * levothyroxine 150 MCG tablet    SYNTHROID    120 tablet    Take 150mcg by mouth daily 6 days a week & 225mcg on Mondays    Malignant neoplasm of thyroid gland (H)       triamcinolone 0.1 % cream    KENALOG     Apply topically 2 times daily as needed for irritation        ursodiol 250 MG tablet    ACTIGALL    180 tablet    Take 1 tablet (250 mg) by mouth 2 times daily    Liver replaced by transplant  (H)       voriconazole 200 MG tablet    VFEND    60 tablet    Take 1 tablet (200 mg) by mouth 2 times daily    Coccidioidal pneumonitis (H)       * Notice:  This list has 2 medication(s) that are the same as other medications prescribed for you. Read the directions carefully, and ask your doctor or other care provider to review them with you.

## 2018-06-01 NOTE — LETTER
[address] [address]  Phone: [phone]   June 6, 2018      Luz Thompson  [address] [address]        Dear Luz Thompson    I am happy to let you know that your mammogram was normal. You may schedule a follow up mammogram in 1-2 years depending on your risk factors and family history. Please let me know if you have any questions about your results. You should be receiving a letter from the radiologist.      Enclosed is a copy of the results.  It was a pleasure to see you at your last appointment.    If you have any questions or concerns, please call myself or my nurse at [phone].      Sincerely,      Jennifer Barraza MD, MPH /BEE Jenkins MA      Results for orders placed or performed in visit on 06/01/18   MA Screen Bilateral w/Julius    Narrative    Examination: Bilateral digital screening mammography with computer  aided detection and tomosynthesis    Comparison: 8/8/2016, 5/29/2014    History/Family History: No symptoms, routine screening.    Technique: Tomosynthesis    BREAST DENSITY: Almost entirely fat.    COMMENTS: There has been no significant change.       Impression    IMPRESSION: BI-RADS CATEGORY: 1 -  NEGATIVE.    RECOMMENDED FOLLOW-UP: Annual Mammography.      The patient will be notified of the results.     SARAHI BRADY MD     *Note: Due to a large number of results and/or encounters for the requested time period, some results have not been displayed. A complete set of results can be found in Results Review.

## 2018-06-01 NOTE — LETTER
My Depression Action Plan  Name: Luz Thompson   Date of Birth 1950  Date: 6/1/2018    My doctor: Jennifer Barraza   My clinic: [address] [id number] [phone]          GREEN    ZONE   Good Control    What it looks like:     Things are going generally well. You have normal up s and down s. You may even feel depressed from time to time, but bad moods usually last less than a day.   What you need to do:  1. Continue to care for yourself (see self care plan)  2. Check your depression survival kit and update it as needed  3. Follow your physician s recommendations including any medication.  4. Do not stop taking medication unless you consult with your physician first.           YELLOW         ZONE Getting Worse    What it looks like:     Depression is starting to interfere with your life.     It may be hard to get out of bed; you may be starting to isolate yourself from others.    Symptoms of depression are starting to last most all day and this has happened for several days.     You may have suicidal thoughts but they are not constant.   What you need to do:     1. Call your care team, your response to treatment will improve if you keep your care team informed of your progress. Yellow periods are signs an adjustment may need to be made.     2. Continue your self-care, even if you have to fake it!    3. Talk to someone in your support network    4. Open up your depression survival kit           RED    ZONE Medical Alert - Get Help    What it looks like:     Depression is seriously interfering with your life.     You may experience these or other symptoms: You can t get out of bed most days, can t work or engage in other necessary activities, you have trouble taking care of basic hygiene, or basic responsibilities, thoughts of suicide or death that will not go away, self-injurious behavior.     What you need to do:  1. Call your  care team and request a same-day appointment. If they are not available (weekends or after hours) call your local crisis line, emergency room or 911.            Depression Self Care Plan / Survival Kit    Self-Care for Depression  Here s the deal. Your body and mind are really not as separate as most people think.  What you do and think affects how you feel and how you feel influences what you do and think. This means if you do things that people who feel good do, it will help you feel better.  Sometimes this is all it takes.  There is also a place for medication and therapy depending on how severe your depression is, so be sure to consult with your medical provider and/ or Behavioral Health Consultant if your symptoms are worsening or not improving.     In order to better manage my stress, I will:    Exercise  Get some form of exercise, every day. This will help reduce pain and release endorphins, the  feel good  chemicals in your brain. This is almost as good as taking antidepressants!  This is not the same as joining a gym and then never going! (they count on that by the way ) It can be as simple as just going for a walk or doing some gardening, anything that will get you moving.      Hygiene   Maintain good hygiene (Get out of bed in the morning, Make your bed, Brush your teeth, Take a shower, and Get dressed like you were going to work, even if you are unemployed).  If your clothes don't fit try to get ones that do.    Diet  I will strive to eat foods that are good for me, drink plenty of water, and avoid excessive sugar, caffeine, alcohol, and other mood-altering substances.  Some foods that are helpful in depression are: complex carbohydrates, B vitamins, flaxseed, fish or fish oil, fresh fruits and vegetables.    Psychotherapy  I agree to participate in Individual Therapy (if recommended).    Medication  If prescribed medications, I agree to take them.  Missing doses can result in serious side effects.  I  understand that drinking alcohol, or other illicit drug use, may cause potential side effects.  I will not stop my medication abruptly without first discussing it with my provider.    Staying Connected With Others  I will stay in touch with my friends, family members, and my primary care provider/team.    Use your imagination  Be creative.  We all have a creative side; it doesn t matter if it s oil painting, sand castles, or mud pies! This will also kick up the endorphins.    Witness Beauty  (AKA stop and smell the roses) Take a look outside, even in mid-winter. Notice colors, textures. Watch the squirrels and birds.     Service to others  Be of service to others.  There is always someone else in need.  By helping others we can  get out of ourselves  and remember the really important things.  This also provides opportunities for practicing all the other parts of the program.    Humor  Laugh and be silly!  Adjust your TV habits for less news and crime-drama and more comedy.    Control your stress  Try breathing deep, massage therapy, biofeedback, and meditation. Find time to relax each day.     My support system    Clinic Contact:  Phone number:    Contact 1:  Phone number:    Contact 2:  Phone number:    Hindu/:  Phone number:    Therapist:  Phone number:    Local crisis center:    Phone number:    Other community support:  Phone number:

## 2018-06-01 NOTE — PROGRESS NOTES
History of Present Illness - Luz Thompson is a 68 year old female last seen 5/2/2017     To review, she has had LEFT ear surgery with a tympanoplasty in 1996.  Otherwise no chronic ear disease.  She has also had thyroid surgery and ablation in March 2010.  The main reason she is here is progressive bilateral ear fullness and blockage of her hearing aids with cerumen.    At the March 2014 visit, she had been through quite an ordeal, having been hospitalized for E. Coli sepsis, the source was unknown. No new issues with that problem since then. And after asking her about it, it happened again this past June of 2015.    Her past year has been fine, and other than fullness from her cerumen build up, no new ENT issues. No drainage from the ears, no bleeding, no vertigo. She still uses bilateral hearing aids.      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 , Disp: , Rfl:      calcitRIOL (ROCALTROL) 0.5 MCG capsule, Take 1 capsule (0.5 mcg) by mouth daily, Disp: 90 capsule, Rfl: 0     clotrimazole (LOTRIMIN) 1 % cream, Apply topically 2 times daily as needed Reported on 4/25/2017, Disp: , Rfl:      ELIQUIS 2.5 MG tablet, Take 2.5 mg by  mouth 2 times daily , Disp: , Rfl:      estradiol (ESTRACE VAGINAL) 0.1 MG/GM cream, Place 2 g vaginally twice a week, Disp: 42.5 g, Rfl: 11     ezetimibe (ZETIA) 10 MG tablet, Take 10 mg by mouth every evening , Disp: 30 tablet, Rfl: 0     folic acid (FOLVITE) 1 MG tablet, Take 1 tablet (1 mg) by mouth daily, Disp: 100 tablet, Rfl: 3     furosemide (LASIX) 20 MG tablet, Take 0.5 tablets (10 mg) by mouth daily, Disp: 46 tablet, Rfl: 3     hydrALAZINE (APRESOLINE) 25 MG tablet, Take 0.5 tablets (12.5 mg) by mouth 3 times daily as needed , if systolic blood pressure is more than 140 mmHg., Disp: 270 tablet, Rfl: 3     Hypromellose (ARTIFICIAL TEARS OP), Apply 1 drop to eye as needed, Disp: , Rfl:      ketoconazole (NIZORAL) 2 % cream, Apply topically 2 times daily To angles of mouth, Disp: 15 g, Rfl: 0     levothyroxine (SYNTHROID) 150 MCG tablet, Take 150mcg by mouth daily 6 days a week & 225mcg on Mondays, Disp: 120 tablet, Rfl: 0     levothyroxine (SYNTHROID) 150 MCG tablet, Take 225 mcg by mouth on Mondays, Disp: , Rfl:      meclizine (ANTIVERT) 25 MG tablet, Take 1 tablet (25 mg) by mouth as needed, Disp: 30 tablet, Rfl: 11     metoprolol (TOPROL-XL) 50 MG 24 hr tablet, Take 1 tablet (50 mg) by mouth daily, Disp: 90 tablet, Rfl: 3     Multiple Vitamins-Minerals (PRESERVISION AREDS 2) CAPS, Take 1 capsule by mouth 2 times daily, Disp: , Rfl:      nitroFURantoin, macrocrystal-monohydrate, (MACROBID) 100 MG capsule, Take 1 capsule (100 mg) by mouth 2 times daily For one week at onset of UTI symptoms, Disp: 14 capsule, Rfl: 6     predniSONE (DELTASONE) 5 MG tablet, Take 1 tablet (5 mg) by mouth daily, Disp: 90 tablet, Rfl: 3     RAPAMUNE (BRAND) 0.5 MG PO TABLET, Take 0.5 mg by mouth every other day, Disp: , Rfl:      sertraline (ZOLOFT) 50 MG tablet, Take 1 tablet (50 mg) by mouth daily, Disp: 30 tablet, Rfl: 0     SUMAtriptan (IMITREX) 50 MG tablet, Take 1 tablet (50 mg) by mouth at onset of headache for migraine  "repeat after 2 hours if needed., Disp: 30 tablet, Rfl: 3     triamcinolone (KENALOG) 0.1 % cream, Apply topically 2 times daily as needed for irritation, Disp: , Rfl:      ursodiol (ACTIGALL) 250 MG tablet, Take 1 tablet (250 mg) by mouth 2 times daily, Disp: 180 tablet, Rfl: 3     voriconazole (VFEND) 200 MG tablet, Take 1 tablet (200 mg) by mouth 2 times daily, Disp: 60 tablet, Rfl: 2     Wheat Dextrin (BENEFIBER) POWD, 2 teaspoons daily, Disp: , Rfl:     Allergies -   Allergies   Allergen Reactions     Fluconazole Hives and Itching     Ciprofloxacin Anxiety and Other (See Comments)     Irregular heart beat     Azithromycin Itching     Benadryl [Diphenhydramine Hcl]      Insomnia      Cellcept Diarrhea     Ciprofloxacin Other (See Comments)     Insomnia, mood lability     Codeine      Psych disturbance     Codeine      Diphenhydramine Other (See Comments)     Doxycycline      Lansoprazole Diarrhea     Levaquin [Levofloxacin] Other (See Comments)     Headache, hyperactivity     Lisinopril Cough     Methotrexate      Sores     Methotrexate      Morphine Sulfate Itching     Mycophenolate Diarrhea     No Clinical Screening - See Comments      Simvastatin Muscle Pain (Myalgia)     severe     Cephalexin Rash     Fever and skin burning     Penicillin G Itching and Rash     Tolectin [Nsaids] Rash     Tramadol Rash       Social History -   History     Social History     Marital Status:      Spouse Name: Robbin Thompson     Number of Children: 4     Years of Education: 20     Occupational History     [employer]  [employer]      Self     Social History Main Topics     Smoking status: Former Smoker -- 1.00 packs/day for 18 years     Types: Cigarettes     Quit date: 04/12/1985     Smokeless tobacco: Never Used     Alcohol Use: Yes      Comment: rare - \"I toast at weddings\"     Drug Use: No     Sexual Activity:     Partners: Male     Birth Control/ Protection: Post-menopausal     Other Topics " "Concern     Exercise Yes     cardio and strengthing     Social History Narrative    She is retired. She and her  are traveling around the [place] in an RV. They are planning to be in [place] for the winter.       Family History -   Family History   Problem Relation Age of Onset     Hypertension Mother      Endometrial Cancer Mother      Hyperlipidemia Mother      Prostate Cancer Father      Macular Degeneration Father      Cancer - colorectal Maternal Grandmother      in her 80's, has surgery and removal of part of kidney,  at age 98     HEART DISEASE Maternal Grandfather       at 98     Glaucoma Maternal Grandfather      CEREBROVASCULAR DISEASE Paternal Grandmother      in her 80's     Hypertension Paternal Grandmother      HEART DISEASE Paternal Grandfather      MI     Alzheimer Disease Paternal Grandfather      Allergies Son      Neurologic Disorder Daughter      Migraines     Breast Cancer Other      Anesthesia Reaction No family hx of      Crohn Disease No family hx of      Ulcerative Colitis No family hx of        Review of Systems - As per HPI and PMHx, otherwise 7 system review of the head and neck negative.    Physical Exam  /69  Pulse 83  Resp 12  Ht 1.715 m (5' 7.5\")  Wt 79.4 kg (175 lb)  LMP 1988 (Approximate)  SpO2 99%  BMI 27 kg/m2    General - The patient is well nourished and well developed, and appears to have good nutritional status.  Alert and oriented to person and place, answers questions and cooperates with examination appropriately.   Head and Face - Normocephalic and atraumatic, with no gross asymmetry noted of the contour of the facial features.  The facial nerve is intact, with strong symmetric movements.  Voice and Breathing - The patient was breathing comfortably without the use of accessory muscles. There was no wheezing, stridor, or stertor.  The patients voice was clear and strong, and had appropriate pitch and quality.  Eyes - Extraocular " movements intact, and the pupils were reactive to light.  Sclera were not icteric or injected, conjunctiva were pink and moist.  Mouth - Examination of the oral cavity showed pink, healthy oral mucosa. No lesions or ulcerations noted.  The tongue was mobile and midline, and the dentition were in good condition.    Throat - The walls of the oropharynx were smooth, pink, moist, symmetric, and had no lesions or ulcerations.  The tonsillar pillars and soft palate were symmetric.  The uvula was midline on elevation.    Ears - The tympanic membranes are normal in appearance, bony landmarks are intact.  No retraction, perforation, or masses.  Normal mobility on valsalva maneuver, with no reports of dizziness on insuflation.  No fluid or purulence was seen in the external canal or the middle ear. No evidence of infection of the middle ear or external canal, cerumen was normal in appearance.        A/P - Luz Thompson is a 68 year old female  (H90.5) SNHL (sensorineural hearing loss)  (primary encounter diagnosis)  (H61.23) Bilateral impacted cerumen    The patient had no cerumen bilaterally to my surprise.  Follow up with me in another year, sooner if there are any new ear issues.

## 2018-06-01 NOTE — MR AVS SNAPSHOT
After Visit Summary   6/1/2018    Luz Thompson    MRN: [id number]           Patient Information     Date Of Birth          1950        Visit Information        Provider Department      6/1/2018 10:20 AM Jennifer Barraza MD [facility]        Today's Diagnoses     Disturbance of skin sensation    -  1    Asymptomatic postmenopausal status        Hyperlipidemia LDL goal <100        Liver replaced by transplant (H)        Age-related osteoporosis without current pathological fracture        Adjustment disorder with depressed mood          Care Instructions    At [facility], we strive to deliver an exceptional experience to you, every time we see you.  If you receive a survey in the mail, please send us back your thoughts. We really do value your feedback.    Based on your medical history, these are the current health maintenance/preventive care services that you are due for (some may have been done at this visit.)  Health Maintenance Due   Topic Date Due     DEPRESSION ACTION PLAN Q1 YR  04/03/1967     DEXA Q3 YR  04/30/2018       Suggested websites for health information:  Www.Advanced Patient Care.SavySwap : Up to date and easily searchable information on multiple topics.  Www.medlineplus.gov : medication info, interactive tutorials, watch real surgeries online  Www.familydoctor.org : good info from the Academy of Family Physicians  Www.cdc.gov : public health info, travel advisories, epidemics (H1N1)  Www.aap.org : children's health info, normal development, vaccinations  Www.health.state.[place].us : [place] dept of health, public health issues in [place], N1N1    Your care team:                            Family Medicine Internal Medicine   MD Vincent Gonzalez MD Shantel Branch-Fleming, MD Katya Georgiev PA-C Megan Hill, APRN TAVIA Landry MD Pediatrics   MIKE Mojica, MD Allison Tesfaye APRN CNP   Marla Moyer MD  MD Jennifer Sheldon MD Kim Thein, APRN CNP      Clinic hours: Monday - Thursday 7 am-7 pm; Fridays 7 am-5 pm.   Urgent care: Monday - Friday 11 am-9 pm; Saturday and Sunday 9 am-5 pm.  Pharmacy : Monday -Thursday 8 am-8 pm; Friday 8 am-6 pm; Saturday and Sunday 9 am-5 pm.     Clinic: [phone]   Pharmacy: [phone]              Follow-ups after your visit        Follow-up notes from your care team     Return in about 3 months (around 9/1/2018) for Lab Work.      Your next 10 appointments already scheduled     Jun 20, 2018 12:00 PM CDT   CT CHEST W/O CONTRAST with UCCT2   [facility] [place] [facility] ([facility] [place])    [address]  1st Floor  [place] [address] [phone]           Please bring any scans or X-rays taken at other hospitals, if similar tests were done. Also bring a list of your medicines, including vitamins, minerals and over-the-counter drugs. It is safest to leave personal items at home.  Be sure to tell your doctor:   If you have any allergies.   If there s any chance you are pregnant.   If you are breastfeeding.  You do not need to do anything special to prepare for this exam.  Please wear loose clothing, such as a sweat suit or jogging clothes. Avoid snaps, zippers and other metal. We may ask you to undress and put on a hospital gown.            Jun 20, 2018  1:00 PM CDT   (Arrive by 12:45 PM)   Return Visit with Preet Villegas MD   [facility] ([facility] [place])    [address]  [address]  [place] [address] [phone]            Jun 27, 2018 12:30 PM CDT   RETURN GENERAL with Dora Tovar MD   Eye Clinic ([facility])    [address]  [address] [facility] [address]  [place] [id number] [phone]            Jun 27, 2018  1:45 PM CDT   RETURN RETINA with Meri Frost MD   Eye Clinic ([facility])    [address]  [place] [id number] [facility] [address] [facility]  9th [address] Clin [address]  [facility] [address]   [phone]            Jun 29, 2018 11:30 AM CDT   (Arrive by 11:15 AM)   Return Visit with Britt Oswald NP   [facility] ([facility] [place])    [address]  [address]  [place] [address]   [phone]            Jul 10, 2018 10:45 AM CDT   Adult Med Follow UP with LIZ Nesbitt CNS   Psychiatry Clinic ([facility])    [facility]  [address] [facility] [facility] [address]  [address] [address]   [phone]            Jul 25, 2018  9:30 AM CDT   (Arrive by 9:15 AM)   Return Visit with Crystal Montero MD   [facility] ([facility] [place])    [address]  [address]  [facility] [address]   [phone]            Aug 06, 2018  9:00 AM CDT   (Arrive by 8:45 AM)   RETURN ENDOCRINE with Rach Vasquez MD   [facility] ([facility] [place])    [address]  3rd Floor  [facility] [address]   [phone]            Aug 15, 2018 11:30 AM CDT   (Arrive by 11:15 AM)   RETURN ATRIAL FIBULATION VISIT with LIZ Sauceda [facility] ([facility] [place])    [address] [place] [address]  [address]  [facility] [address] [phone]              Future tests that were ordered for you today     Open Future Orders        Priority Expected Expires Ordered    Lipid panel reflex to direct LDL Fasting Routine 6/1/2018 6/1/2019 6/1/2018    DEXA HIP/PELVIS/SPINE - Future Routine  6/1/2019 6/1/2018            Who to contact     If you have questions or need follow up information about today's clinic visit or your schedule please contact [facility] directly at [phone].  Normal or non-critical lab and imaging results will be communicated to you by MyChart, letter or phone within 4 business  "days after the clinic has received the results. If you do not hear from us within 7 days, please contact the clinic through IceWEB or phone. If you have a critical or abnormal lab result, we will notify you by phone as soon as possible.  Submit refill requests through IceWEB or call your pharmacy and they will forward the refill request to us. Please allow 3 business days for your refill to be completed.          Additional Information About Your Visit        IceWEB Information     [employer]WEB gives you secure access to your electronic health record. If you see a primary care provider, you can also send messages to your care team and make appointments. If you have questions, please call your primary care clinic.  If you do not have a primary care provider, please call [phone] and they will assist you.        Care EveryWhere ID     This is your Care EveryWhere ID. This could be used by other organizations to access your [place] [facility] medical records  [id number]        Your Vitals Were     Pulse Temperature Respirations Height Last Period Pulse Oximetry    75 98.5  F (36.9  C) (Oral) 16 5' 7.5\" (1.715 m) 06/01/1988 (Approximate) 99%    BMI (Body Mass Index)                   27.16 kg/m2            Blood Pressure from Last 3 Encounters:   06/01/18 154/69   06/01/18 150/82   05/19/18 110/56    Weight from Last 3 Encounters:   06/01/18 175 lb (79.4 kg)   06/01/18 176 lb (79.8 kg)   05/18/18 177 lb 14.4 oz (80.7 kg)               Primary Care Provider Office Phone # Fax #    Jennifer Amparo Barraza [phone]       [address] [place]  [place] [facility] [address]        Equal Access to Services     [facility] : Hadii brayan joy Sokelsi, waaxda luqadaha, qaybta kaalmada chata, etta hardy. So [facility] [phone].    ATENCIÓN: Si habla español, tiene a jason disposición servicios gratuitos de asistencia lingüística. Llame al [phone].    We comply with applicable federal civil " rights laws and [place] laws. We do not discriminate on the basis of race, color, national origin, age, disability, sex, sexual orientation, or gender identity.            Thank you!     Thank you for choosing [facility]  for your care. Our goal is always to provide you with excellent care. Hearing back from our patients is one way we can continue to improve our services. Please take a few minutes to complete the written survey that you may receive in the mail after your visit with us. Thank you!             Your Updated Medication List - Protect others around you: Learn how to safely use, store and throw away your medicines at www.disposemymeds.org.          This list is accurate as of 6/1/18  2:41 PM.  Always use your most recent med list.                   Brand Name Dispense Instructions for use Diagnosis    acetaminophen 500 MG Caps      Take 1,000 mg by mouth nightly as needed Take 500-1,000 mg by mouth every 6 hours if needed.        ARTIFICIAL TEARS OP      Apply 1 drop to eye as needed        BENEFIBER Powd      2 teaspoons daily        calcitRIOL 0.5 MCG capsule    ROCALTROL    90 capsule    Take 1 capsule (0.5 mcg) by mouth daily    Postablative hypoparathyroidism (H)       clotrimazole 1 % cream    LOTRIMIN     Apply topically 2 times daily as needed Reported on 4/25/2017        ELIQUIS 2.5 MG tablet   Generic drug:  apixaban ANTICOAGULANT      Take 2.5 mg by mouth 2 times daily        estradiol 0.1 MG/GM cream    ESTRACE VAGINAL    42.5 g    Place 2 g vaginally twice a week    Atrophic vaginitis       ezetimibe 10 MG tablet    ZETIA    30 tablet    Take 10 mg by mouth every evening        folic acid 1 MG tablet    FOLVITE    100 tablet    Take 1 tablet (1 mg) by mouth daily    Liver replaced by transplant (H)       furosemide 20 MG tablet    LASIX    46 tablet    Take 0.5 tablets (10 mg) by mouth daily    CKD (chronic kidney disease) stage 3, GFR 30-59 ml/min, Liver replaced by  transplant (H)       hydrALAZINE 25 MG tablet    APRESOLINE    270 tablet    Take 0.5 tablets (12.5 mg) by mouth 3 times daily as needed , if systolic blood pressure is more than 140 mmHg.    HTN (hypertension)       ketoconazole 2 % cream    NIZORAL    15 g    Apply topically 2 times daily To angles of mouth    Angular cheilitis       meclizine 25 MG tablet    ANTIVERT    30 tablet    Take 1 tablet (25 mg) by mouth as needed    Dizziness       metoprolol succinate 50 MG 24 hr tablet    TOPROL-XL    90 tablet    Take 1 tablet (50 mg) by mouth daily    Paroxysmal atrial fibrillation (H)       nitroFURantoin (macrocrystal-monohydrate) 100 MG capsule    MACROBID    14 capsule    Take 1 capsule (100 mg) by mouth 2 times daily For one week at onset of UTI symptoms    Urinary tract infection without hematuria, site unspecified       predniSONE 5 MG tablet    DELTASONE    90 tablet    Take 1 tablet (5 mg) by mouth daily    Thyroid cancer (H), Liver replaced by transplant (H)       PRESERVISION AREDS 2 Caps      Take 1 capsule by mouth 2 times daily        sertraline 50 MG tablet    ZOLOFT    30 tablet    Take 1 tablet (50 mg) by mouth daily    Adjustment disorder with depressed mood       sirolimus 0.5 MG Tabs tablet      Take 0.5 mg by mouth every other day        SUMAtriptan 50 MG tablet    IMITREX    30 tablet    Take 1 tablet (50 mg) by mouth at onset of headache for migraine repeat after 2 hours if needed.    Migraine without aura and without status migrainosus, not intractable       * SYNTHROID 150 MCG tablet   Generic drug:  levothyroxine      Take 225 mcg by mouth on Mondays        * levothyroxine 150 MCG tablet    SYNTHROID    120 tablet    Take 150mcg by mouth daily 6 days a week & 225mcg on Mondays    Malignant neoplasm of thyroid gland (H)       triamcinolone 0.1 % cream    KENALOG     Apply topically 2 times daily as needed for irritation        ursodiol 250 MG tablet    ACTIGALL    180 tablet    Take 1 tablet  (250 mg) by mouth 2 times daily    Liver replaced by transplant (H)       voriconazole 200 MG tablet    VFEND    60 tablet    Take 1 tablet (200 mg) by mouth 2 times daily    Coccidioidal pneumonitis (H)       * Notice:  This list has 2 medication(s) that are the same as other medications prescribed for you. Read the directions carefully, and ask your doctor or other care provider to review them with you.

## 2018-06-01 NOTE — PATIENT INSTRUCTIONS
At [facility], we strive to deliver an exceptional experience to you, every time we see you.  If you receive a survey in the mail, please send us back your thoughts. We really do value your feedback.    Based on your medical history, these are the current health maintenance/preventive care services that you are due for (some may have been done at this visit.)  Health Maintenance Due   Topic Date Due     DEPRESSION ACTION PLAN Q1 YR  04/03/1967     DEXA Q3 YR  04/30/2018       Suggested websites for health information:  Www.WAVE ([employer]).org : Up to date and easily searchable information on multiple topics.  Www.A.P.Pharma.gov : medication info, interactive tutorials, watch real surgeries online  Www.familydoctor.org : good info from the Academy of Family Physicians  Www.cdc.gov : public health info, travel advisories, epidemics (H1N1)  Www.aap.org : children's health info, normal development, vaccinations  Www.health.[facility].[place].us : [place] dept of health, public health issues in [place], N1N1    Your care team:                            Family Medicine Internal Medicine   MD Vincent Gonzalez MD Shantel Branch-Fleming, MD Katya Georgiev PA-C Megan Hill, APRN CNP    Curtis Landry, MD Pediatrics   Bertin Stark, PADILIA Tao, CNP MD Allison Betts APRN CNP   MD Evita Cohen MD Deborah Mielke, MD Kim Thein, APRN [facility]      Clinic hours: Monday - Thursday 7 am-7 pm; Fridays 7 am-5 pm.   Urgent care: Monday - Friday 11 am-9 pm; Saturday and Sunday 9 am-5 pm.  Pharmacy : Monday -Thursday 8 am-8 pm; Friday 8 am-6 pm; Saturday and Sunday 9 am-5 pm.     Clinic: [phone]   Pharmacy: [phone]

## 2018-06-06 NOTE — PROGRESS NOTES
Dear Luz Thompson,    I am happy to let you know that your mammogram was normal. You may schedule a follow up mammogram in 1-2 years depending on your risk factors and family history. Please let me know if you have any questions about your results. You should be receiving a letter from the radiologist.    Jennifer Barraza MD

## 2018-06-18 ENCOUNTER — OFFICE VISIT (OUTPATIENT)
Dept: FAMILY MEDICINE | Facility: CLINIC | Age: 68
End: 2018-06-18
Payer: MEDICARE

## 2018-06-18 VITALS
WEIGHT: 174 LBS | DIASTOLIC BLOOD PRESSURE: 72 MMHG | SYSTOLIC BLOOD PRESSURE: 158 MMHG | TEMPERATURE: 98.5 F | HEART RATE: 96 BPM | BODY MASS INDEX: 26.37 KG/M2 | HEIGHT: 68 IN | OXYGEN SATURATION: 96 %

## 2018-06-18 DIAGNOSIS — E11.22 TYPE 2 DIABETES MELLITUS WITH STAGE 3 CHRONIC KIDNEY DISEASE, WITHOUT LONG-TERM CURRENT USE OF INSULIN (H): ICD-10-CM

## 2018-06-18 DIAGNOSIS — E78.5 HYPERLIPIDEMIA LDL GOAL <70: ICD-10-CM

## 2018-06-18 DIAGNOSIS — H60.332 ACUTE SWIMMER'S EAR OF LEFT SIDE: Primary | ICD-10-CM

## 2018-06-18 DIAGNOSIS — N18.30 TYPE 2 DIABETES MELLITUS WITH STAGE 3 CHRONIC KIDNEY DISEASE, WITHOUT LONG-TERM CURRENT USE OF INSULIN (H): ICD-10-CM

## 2018-06-18 DIAGNOSIS — I10 HYPERTENSION GOAL BP (BLOOD PRESSURE) < 140/80: ICD-10-CM

## 2018-06-18 DIAGNOSIS — Z51.81 MEDICATION MONITORING ENCOUNTER: ICD-10-CM

## 2018-06-18 PROCEDURE — 99213 OFFICE O/P EST LOW 20 MIN: CPT | Performed by: FAMILY MEDICINE

## 2018-06-18 RX ORDER — NEOMYCIN SULFATE, POLYMYXIN B SULFATE AND HYDROCORTISONE 10; 3.5; 1 MG/ML; MG/ML; [USP'U]/ML
4 SUSPENSION/ DROPS AURICULAR (OTIC) 4 TIMES DAILY
Qty: 10 ML | Refills: 0 | Status: SHIPPED | OUTPATIENT
Start: 2018-06-18 | End: 2019-05-15

## 2018-06-18 NOTE — PATIENT INSTRUCTIONS
Cortisporin ear drops, as directed - 4 drops, 4 times daily     Follow up if symptoms worsen or don't improve       [facility]                        To reach your care team during and after hours:   [phone]  To reach our pharmacy:        [phone]    Clinic Hours                        Our clinic hours are:    Monday   7:30 am to 7:00 pm                  Tuesday through Friday 7:30 am to 5:00 pm                             Saturday   8:00 am to 12:00 pm      Sunday   Closed      Pharmacy Hours                        Our pharmacy hours are:    Monday   8:30 am to 7:00 pm       Tuesday to Friday  8:30 am to 6:00 pm                       Saturday    9:00 am to 1:00 pm              Sunday    Closed              There is also information available at our web site:  [email or web address]    If your provider ordered any lab tests and you do not receive the results within 10 business days, please call the clinic.    If you need a medication refill please contact your pharmacy.  Please allow 2-3 business days for your refill to be completed.    Our clinic offers telephone visits and e visits.  Please ask one of your team members to explain more.      Use [employer] (secure email communication and access to your chart) to send your primary care provider a message or make an appointment. Ask someone on your Team how to sign up for [employer].  Immunizations                      Immunization History   Administered Date(s) Administered     [id number] Novel Flu P-free 11/10/2009     Influenza (High Dose) 3 valent vaccine 09/25/2015, 10/30/2016, 10/23/2017     Influenza (IIV3) PF 11/10/2004, 09/29/2007, 10/01/2010, 09/27/2012, 10/29/2012, 09/30/2013, 09/01/2016     Pneumo Conj 13-V (2010&after) 02/26/2014     Pneumococcal 23 valent 12/01/2007, 05/25/2016     TD (ADULT, 7+) 01/01/2007     TDAP Vaccine (Adacel) 09/17/2007, 02/26/2014        Health Maintenance                         Health Maintenance Due   Topic Date  Due     Osteoporosis Screening (Dexa) - every 3 years   04/30/2018     Thyroid Function Lab (TSH) - yearly  07/13/2018

## 2018-06-18 NOTE — PROGRESS NOTES
SUBJECTIVE:   Luz Thompson is a 68 year old female who presents to clinic today for the following health issues:    Concern - sore in left ear - draining  Onset: sore has been there 3-4 days - drained last night    Description: Sensitive to touch, Was just seen by ENT recently (2 weeks ago) and was told ears looked good - pt wears hearing aids    Intensity: moderate    Progression of Symptoms:  same    Accompanying Signs & Symptoms: Swelling and redness    Previous history of similar problem:  nothing    Precipitating factors:  Worsened by: touch    Alleviating factors: Improved by: none    Therapies Tried and outcome: none    Drainage last night was yellow-white in color, along with diminished hearing lately. Pt has not been swimming recently.     Denied fever/chills/sweats, sinus pressure, right ear sx, sore throat, cough.     Reviewed recent labs    Cholesterol   Date Value Ref Range Status   05/18/2018 265 (H) <200 mg/dL Final     Comment:     Desirable:       <200 mg/dl   10/05/2017 231 (H) <200 mg/dL Final     Comment:     Desirable:       <200 mg/dl   08/22/2017 291 (H) <200 mg/dL Final     Comment:     Desirable:       <200 mg/dl   05/10/2017 322 (H) <200 mg/dL Final     Comment:     Desirable:       <200 mg/dl   05/24/2016 284 (H) <200 mg/dL Final     Comment:     Desirable:       <200 mg/dl     Lab Results   Component Value Date    A1C 6.8 05/18/2018     BP Readings from Last 3 Encounters:   06/18/18 158/72   06/01/18 154/69   06/01/18 150/82     Recent Labs   Lab Test  05/18/18   0731  10/05/17   0907   09/18/15   0954  05/08/14   0806   CHOL  265*  231*   < >  181  159   HDL  49*  50   < >  63  48*   LDL  Cannot estimate LDL when triglyceride exceeds 400 mg/dL  150*  Cannot estimate LDL when triglyceride exceeds 400 mg/dL  135*   < >  84  88   TRIG  557*  419*   < >  172*  112   CHOLHDLRATIO   --    --    --   2.9  3.3    < > = values in this interval not displayed.       Problem list and  "histories reviewed & adjusted, as indicated.  Additional history: as documented    Reviewed and updated as needed this visit by clinical staff  Tobacco  Allergies  Meds  Med Hx  Surg Hx  Fam Hx  Soc Hx      Reviewed and updated as needed this visit by Provider       Wt Readings from Last 4 Encounters:   06/18/18 174 lb (78.9 kg)   06/01/18 175 lb (79.4 kg)   06/01/18 176 lb (79.8 kg)   05/18/18 177 lb 14.4 oz (80.7 kg)       Health Maintenance    Health Maintenance Due   Topic Date Due     FOOT EXAM Q1 YEAR  04/03/1951     DEXA Q3 YR  04/30/2018     EYE EXAM Q1 YEAR  07/12/2018     TSH W/ FREE T4 REFLEX Q1 YEAR  07/13/2018       Current Problem List    Patient Active Problem List   Diagnosis     Bladder infection, chronic     Osteoporosis     Osteoarthritis of right knee     HDL deficiency     Advanced directives, counseling/discussion     Vitamin D deficiency     S/P tympanoplasty     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     Prophylactic antibiotic     High risk medication use     Statin intolerance     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     Coccidioidal pneumonitis (H)     SNHL (sensorineural hearing loss)     Abnormal liver function tests     Diagnostic skin and sensitization tests     Perforation bowel (H)     Liver replaced by transplant (H)     Adjustment disorder with depressed mood     Type 2 diabetes, HbA1c goal < 7% (H)     Hyperlipidemia LDL goal <70     Hypertension goal BP (blood pressure) < 140/80       Past Medical History    Past Medical History:   Diagnosis Date     Anemia of other chronic disease 10/17/2011     Anxiety      Bladder infection, chronic 4/4/2012     CKD (chronic kidney disease) stage 3, GFR 30-59 ml/min 4/4/2012     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      Diverticulosis of sigmoid colon 12/21/2013     EBV (Waqas-Barr " virus) viremia     Received Rituxan during Summer of 2016     H/O esophageal varices      Hearing loss      Heart murmur 4/4/2012     History of DVT (deep vein thrombosis)      History of [place] fever      History of thyroid cancer 9/25/2012     Hyperlipidemia 4/10/2012    Says that she does not have it anymore, not on meds     Hyperlipidemia LDL goal <70      Hypertension goal BP (blood pressure) < 140/80 11/06/2013     Hypertriglyceridemia      Liver replaced by transplant (H) 10/17/2011    Dr. 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       Past Surgical History    Past Surgical History:   Procedure Laterality Date     APPENDECTOMY  1961     BIOPSY       CATARACT IOL, RT/LT      RE12/19/2013, LE12/10/2013 - Toric lenses     CHOLECYSTECTOMY  1991     COLONOSCOPY  3/10/2014    Procedure: COLONOSCOPY;;  Surgeon: Gentry Ramirez MD;  Location:  GI     ear drum repair       ENDOBRONCHIAL ULTRASOUND FLEXIBLE N/A 9/29/2017    Procedure: ENDOBRONCHIAL ULTRASOUND FLEXIBLE;  Flexible Bronchoscopy, Endobronchial Ultrasound, Transbronchial Needle Aspiration ;  Surgeon: Eden Clinton MD;  Location: [facility] OR     ENDOSCOPIC RETROGRADE CHOLANGIOPANCREATOGRAM  9/19/2013    Procedure: ENDOSCOPIC RETROGRADE CHOLANGIOPANCREATOGRAM;  Endoscopic Retrograde Cholangiopancreatogram with single balloon enteroscopy, ballon sweep of bile duct;  Surgeon: Brett Membreno MD;  Location:  OR      KNEE SCOPE,MED/LAT MENISECTOMY  8/10/12    Right, partial medial menisectomy only     KNEE SURGERY  1966     R knee     PICC INSERTION  9/18/2013    4fr SL PASV PICC, 40cm (1cm external) in the R basilic vein w/ tip in the low SVC     PICC INSERTION  2/21/2014    5 fr DL BioFlo Navilyst PICC, 46 cm (3 cm external) in the L basilic vein w/ tip in the SVC RA junction.     THYROIDECTOMY  3/2010     TRANSPLANT LIVER RECIPIENT LIVING UNRELATED         Current Medications    Current Outpatient Prescriptions   Medication Sig Dispense Refill     acetaminophen 500 MG CAPS Take 1,000 mg by mouth nightly as needed Take 500-1,000 mg by mouth every 6 hours if needed.        calcitRIOL (ROCALTROL) 0.5 MCG capsule Take 1 capsule (0.5 mcg) by mouth daily 90 capsule 0     clotrimazole (LOTRIMIN) 1 % cream Apply topically 2 times daily as needed Reported on 4/25/2017       ELIQUIS 2.5 MG tablet Take 2.5 mg by mouth 2 times daily        estradiol (ESTRACE VAGINAL) 0.1 MG/GM cream Place 2 g vaginally twice a week 42.5 g 11     ezetimibe (ZETIA) 10 MG tablet Take 10 mg by mouth every evening  30 tablet 0     folic acid (FOLVITE) 1 MG tablet Take 1 tablet (1 mg) by mouth daily 100 tablet 3     furosemide (LASIX) 20 MG tablet Take 0.5 tablets (10 mg) by mouth daily 46 tablet 3     hydrALAZINE (APRESOLINE) 25 MG tablet Take 0.5 tablets (12.5 mg) by mouth 3 times daily as needed , if systolic blood pressure is more than 140 mmHg. 270 tablet 3     Hypromellose (ARTIFICIAL TEARS OP) Apply 1 drop to eye as needed       ketoconazole (NIZORAL) 2 % cream Apply topically 2 times daily To angles of mouth 15 g 0     levothyroxine (SYNTHROID) 150 MCG tablet Take 150mcg by mouth daily 6 days a week & 225mcg on Mondays 120 tablet 0     levothyroxine (SYNTHROID) 150 MCG tablet Take 225 mcg by mouth on Mondays       meclizine (ANTIVERT) 25 MG tablet Take 1 tablet (25 mg) by mouth as needed 30 tablet 11     metoprolol (TOPROL-XL) 50 MG 24 hr tablet Take 1 tablet (50 mg) by mouth daily 90 tablet 3     Multiple Vitamins-Minerals (PRESERVISION AREDS 2) CAPS  Take 1 capsule by mouth 2 times daily       neomycin-polymyxin-hydrocortisone (CORTISPORIN) 3.5-75368-3 otic suspension Place 4 drops Into the left ear 4 times daily 10 mL 0     nitroFURantoin, macrocrystal-monohydrate, (MACROBID) 100 MG capsule Take 1 capsule (100 mg) by mouth 2 times daily For one week at onset of UTI symptoms 14 capsule 6     predniSONE (DELTASONE) 5 MG tablet Take 1 tablet (5 mg) by mouth daily 90 tablet 3     RAPAMUNE (BRAND) 0.5 MG PO TABLET Take 0.5 mg by mouth every other day       SUMAtriptan (IMITREX) 50 MG tablet Take 1 tablet (50 mg) by mouth at onset of headache for migraine repeat after 2 hours if needed. 30 tablet 3     triamcinolone (KENALOG) 0.1 % cream Apply topically 2 times daily as needed for irritation       ursodiol (ACTIGALL) 250 MG tablet Take 1 tablet (250 mg) by mouth 2 times daily 180 tablet 3     voriconazole (VFEND) 200 MG tablet Take 1 tablet (200 mg) by mouth 2 times daily 60 tablet 2     Wheat Dextrin (BENEFIBER) POWD 2 teaspoons daily       sertraline (ZOLOFT) 50 MG tablet Take 1 tablet (50 mg) by mouth daily 30 tablet 0       Allergies    Allergies   Allergen Reactions     Fluconazole Hives and Itching     Ciprofloxacin Anxiety and Other (See Comments)     Irregular heart beat     Azithromycin Itching     Benadryl [Diphenhydramine Hcl]      Insomnia      Cellcept Diarrhea     Ciprofloxacin Other (See Comments)     Insomnia, mood lability     Codeine      Psych disturbance     Codeine      Diphenhydramine Other (See Comments)     Doxycycline      Lansoprazole Diarrhea     Levaquin [Levofloxacin] Other (See Comments)     Headache, hyperactivity     Lisinopril Cough     Methotrexate      Sores     Methotrexate      Morphine Sulfate Itching     Mycophenolate Diarrhea     No Clinical Screening - See Comments      Simvastatin Muscle Pain (Myalgia)     severe     Cephalexin Rash     Fever and skin burning     Penicillin G Itching and Rash     Tolectin [Nsaids] Rash      "Tramadol Rash       Immunizations    Immunization History   Administered Date(s) Administered     S3x6-93 Novel Flu P-free 11/10/2009     Influenza (High Dose) 3 valent vaccine 2015, 10/30/2016, 10/23/2017     Influenza (IIV3) PF 11/10/2004, 2007, 10/01/2010, 2012, 10/29/2012, 2013, 2016     Pneumo Conj 13-V (2010&after) 2014     Pneumococcal 23 valent 2007, 2016     TD (ADULT, 7+) 2007     TDAP Vaccine (Adacel) 2007, 2014       Family History    Family History   Problem Relation Age of Onset     Hypertension Mother      Endometrial Cancer Mother      Hyperlipidemia Mother      Prostate Cancer Father      Macular Degeneration Father      Cancer - colorectal Maternal Grandmother      in her 80's, has surgery and removal of part of kidney,  at age 98     HEART DISEASE Maternal Grandfather       at 98     Glaucoma Maternal Grandfather      Cerebrovascular Disease Paternal Grandmother      in her 80's     Hypertension Paternal Grandmother      HEART DISEASE Paternal Grandfather      MI     Alzheimer Disease Paternal Grandfather      Allergies Son      Neurologic Disorder Daughter      Migraines     Breast Cancer Other      Anesthesia Reaction No family hx of      Crohn Disease No family hx of      Ulcerative Colitis No family hx of        Social History    Social History     Social History     Marital status: Legally      Spouse name: Robbin Thompson     Number of children: 4     Years of education: 20     Occupational History     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 She and her  travel around the [place] in an [place]. [place] usually are in the [place] the course of the [place]. 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       All above reviewed and updated, all stable unless otherwise noted    Recent labs reviewed    ROS:  Constitutional, HEENT, cardiovascular, pulmonary, GI, , musculoskeletal, neuro, skin, endocrine and psych systems are negative, except as in HPI or otherwise noted     This document serves as a record of the services and decisions personally performed and made by Melchor Treadwell MD FAAFP. It was created on their behalf by Martin Guerin, a trained medical scribe. The creation of this document is based the provider's statements to the medical scribe.  Martin Guerin June 18, 2018 4:33 PM      OBJECTIVE:                                                    /72  Pulse 96  Temp 98.5  F (36.9  C) (Oral)  Ht 5' 7.5\" (1.715 m)  Wt 174 lb (78.9 kg)  LMP 06/01/1988 (Approximate)  SpO2 96%  BMI 26.85 kg/m2  Body mass index is 26.85 kg/(m^2).  GENERAL: healthy, alert and no distress  HENT: ear canals and TM's with otitis media to left with whitish discharge upon viewing with otoscope, right is normal, nose and mouth without ulcers or lesions upon viewing with otoscope  RESP: lungs clear to auscultation - no rales, no rhonchi, no wheezes  CV: regular rates and rhythm, normal S1 S2, no S3 or S4, noted grade 2-3/6 systolic murmur, no click or rub -  ABDOMEN: soft, no tenderness, no  hepatosplenomegaly, no masses, normal bowel sounds  MS: extremities- no gross deformities noted, no edema  SKIN: no suspicious lesions, no rashes to visible skin  NEURO: mentation intact and speech normal  BACK: no CVA tenderness, no " paralumbar tenderness  PSYCH: affect normal    DIAGNOSTICS/PROCEDURES:                                                      No results found. However, due to the size of the patient record, not all encounters were searched. Please check Results Review for a complete set of results.     ASSESSMENT/PLAN:                                                        ICD-10-CM    1. Acute swimmer's ear of left side H60.332 neomycin-polymyxin-hydrocortisone (CORTISPORIN) 3.5-08523-2 otic suspension   2. Type 2 diabetes mellitus with stage 3 chronic kidney disease, without long-term current use of insulin (H) E11.22     N18.3    3. Hypertension goal BP (blood pressure) < 140/80 I10    4. Hyperlipidemia LDL goal <70 E78.5    5. Medication monitoring encounter Z51.81      Discussed treatment/modality options, including risk and benefits, she desires advised diet, exercise, and weight loss, follow up with another visit, further health care maintenance, new medication(s), OTC meds, and observation. All diagnosis above reviewed and noted above, otherwise stable.  See [facility] for further details.      Return in about 2 weeks (around 7/2/2018), or if symptoms worsen or fail to improve.    Health Maintenance Due   Topic Date Due     FOOT EXAM Q1 YEAR  04/03/1951     DEXA Q3 YR  04/30/2018     EYE EXAM Q1 YEAR  07/12/2018     TSH W/ FREE T4 REFLEX Q1 YEAR  07/13/2018     Patient Instructions     Cortisporin ear drops, as directed - 4 drops, 4 times daily     Follow up with PCP - ASAP to review DM, etc    Follow up if symptoms worsen or don't improve     The information in this document, created by the medical scribe for me, accurately reflects the services I personally performed and the decisions made by me. I have reviewed and approved this document for accuracy.   Melchor Treadwell MD FAAFP            Melchor Treadwell MD [address]  [address]  [phone]  Fax

## 2018-06-18 NOTE — MR AVS SNAPSHOT
After Visit Summary   6/18/2018    Luz Thompson    MRN: [id number]           Patient Information     Date Of Birth          1950        Visit Information        Provider Department      6/18/2018 3:40 PM Melchor Treadwell MD [facility]        Today's Diagnoses     Acute swimmer's ear of left side    -  1      Care Instructions    Cortisporin ear drops, as directed - 4 drops, 4 times daily     Follow up if symptoms worsen or don't improve       [facility]                        To reach your care team during and after hours:   [phone]  To reach our pharmacy:        [phone]    Clinic Hours                        Our clinic hours are:    Monday   7:30 am to 7:00 pm                  Tuesday through Friday 7:30 am to 5:00 pm                             Saturday   8:00 am to 12:00 pm      Sunday   Closed      Pharmacy Hours                        Our pharmacy hours are:    Monday   8:30 am to 7:00 pm       Tuesday to Friday  8:30 am to 6:00 pm                       Saturday    9:00 am to 1:00 pm              Sunday    Closed              There is also information available at our web site:  [email or web address]    If your provider ordered any lab tests and you do not receive the results within 10 business days, please call the clinic.    If you need a medication refill please contact your pharmacy.  Please allow 2-3 business days for your refill to be completed.    Our clinic offers telephone visits and e visits.  Please ask one of your team members to explain more.      Use [employer] (secure email communication and access to your chart) to send your primary care provider a message or make an appointment. Ask someone on your Team how to sign up for [employer].  Immunizations                      Immunization History   Administered Date(s) Administered     [id number] Novel Flu P-free 11/10/2009     Influenza (High Dose) 3 valent vaccine 09/25/2015, 10/30/2016, 10/23/2017      Influenza (IIV3) PF 11/10/2004, 09/29/2007, 10/01/2010, 09/27/2012, 10/29/2012, 09/30/2013, 09/01/2016     Pneumo Conj 13-V (2010&after) 02/26/2014     Pneumococcal 23 valent 12/01/2007, 05/25/2016     TD (ADULT, 7+) 01/01/2007     TDAP Vaccine (Adacel) 09/17/2007, 02/26/2014        Health Maintenance                         Health Maintenance Due   Topic Date Due     Osteoporosis Screening (Dexa) - every 3 years   04/30/2018     Thyroid Function Lab (TSH) - yearly  07/13/2018               Follow-ups after your visit        Follow-up notes from your care team     Return if symptoms worsen or fail to improve.      Your next 10 appointments already scheduled     Jun 27, 2018 12:30 PM CDT   RETURN GENERAL with Dora Tovar MD   [facility] Clinic ([facility])    [address] [address]   [phone]            Jun 27, 2018  1:45 PM CDT   RETURN RETINA with Meri Frost MD   Eye Clinic ([facility])    [address] [address]   [phone]            Jun 27, 2018  2:40 PM CDT   CT CHEST W/O CONTRAST with UUCT1   [facility] [place] [place] ([facility])    [address] [address]   [phone]           Please bring any scans or X-rays taken at other hospitals, if similar tests were done. Also bring a list of your medicines, including vitamins, minerals and over-the-counter drugs. It is safest to leave personal items at home.  Be sure to tell your doctor:   If you have any allergies.   If there s any chance you are pregnant.   If you are breastfeeding.  You do not need to do anything special to prepare for this exam.  Please wear loose clothing, such as a sweat suit or jogging clothes. Avoid snaps, zippers and other metal. We may ask you to undress and put on a hospital gown.            Jun 27, 2018   3:45 PM CDT   (Arrive by 3:30 PM)   Return Visit with LIZ Gamboa CNP   [facility] ([facility])    [address]  [address]  [facility] [address] [phone]            Jun 29, 2018 11:30 AM CDT   (Arrive by 11:15 AM)   Return Visit with Britt Oswald NP   [facility] ([facility])    [address]  [address]  [facility] [address] [phone]            Jun 29, 2018  3:00 PM CDT   DX HIP/PELVIS/SPINE with RIDX1   [facility] ([facility])    [address]  [address]  [facility] [address]              Please do not take any of the following 24 hours prior to the day of your exam: vitamins, calcium tablets, antacids.  If possible, please wear clothes without metal (snaps, zippers). A sweatsuit works well.            Jul 10, 2018 10:45 AM CDT   Adult Med Follow UP with LIZ Nesbitt CNS   Psychiatry Clinic ([facility])    [place] [id number]  [address] [address] [phone]            Jul 25, 2018  9:30 AM CDT   (Arrive by 9:15 AM)   Return Visit with Crystal Montero MD   [facility] ([facility])    [address]  [address]  [facility] [address] [phone]            Aug 06, 2018  9:00 AM CDT   (Arrive by 8:45 AM)   RETURN ENDOCRINE with Rach Vasquez MD   [facility] ([facility])    [address]  3rd Floor  [facility] [id number] [phone]            Aug 15, 2018 11:30 AM CDT   (Arrive by 11:15 AM)   RETURN ATRIAL FIBULATION VISIT with LIZ Sauceda CNP   [facility] ([facility])    [address] [place] [address]  [address]  [facility] [address] [phone]              Who to contact  "    If you have questions or need follow up information about today's clinic visit or your schedule please contact [facility] directly at [phone].  Normal or non-critical lab and imaging results will be communicated to you by everyArthart, letter or phone within 4 business days after the clinic has received the results. If you do not hear from us within 7 days, please contact the clinic through everyArthart or phone. If you have a critical or abnormal lab result, we will notify you by phone as soon as possible.  Submit refill requests through [employer] or call your pharmacy and they will forward the refill request to us. Please allow 3 business days for your refill to be completed.          Additional Information About Your Visit        everyArtharKiip Information     [employer] gives you secure access to your electronic health record. If you see a primary care provider, you can also send messages to your care team and make appointments. If you have questions, please call your primary care clinic.  If you do not have a primary care provider, please call [phone] and they will assist you.        Care EveryWhere ID     This is your Care EveryWhere ID. This could be used by other organizations to access your [facility] medical records  [id number]        Your Vitals Were     Pulse Temperature Height Last Period Pulse Oximetry BMI (Body Mass Index)    96 98.5  F (36.9  C) (Oral) 5' 7.5\" (1.715 m) 06/01/1988 (Approximate) 96% 26.85 kg/m2       Blood Pressure from Last 3 Encounters:   06/18/18 158/72   06/01/18 154/69   06/01/18 150/82    Weight from Last 3 Encounters:   06/18/18 174 lb (78.9 kg)   06/01/18 175 lb (79.4 kg)   06/01/18 176 lb (79.8 kg)              Today, you had the following     No orders found for display         Today's Medication Changes          These changes are accurate as of 6/18/18  4:44 PM.  If you have any questions, ask your nurse or doctor.               Start taking these medicines.        " Dose/Directions    neomycin-polymyxin-hydrocortisone 3.5-15791-6 otic suspension   Commonly known as:  CORTISPORIN   Used for:  Acute swimmer's ear of left side   Started by:  Melchor Treadwell MD        Dose:  4 drop   Place 4 drops Into the left ear 4 times daily   Quantity:  10 mL   Refills:  0            Where to get your medicines      These medications were sent to [facility] - [place], [place] - [address]  [address], [facility] [address]     Phone:  [phone]     neomycin-polymyxin-hydrocortisone 3.5-90263-1 otic suspension                Primary Care Provider Office Phone # Fax #    Jennifer Amparo Barraza [phone] [address] [place]  [facility] [address]        Equal Access to Services     [facility]: Hadii brayan guo hadasho Soomaali, waaxda luqadaha, qaybta kaalmada adeegyada, waxay kayla hayaugustus lu . So [facility] [phone].    ATENCIÓN: Si habla español, tiene a jason disposición servicios gratuitos de asistencia lingüística. Jonas al [phone].    We comply with applicable federal civil rights laws and [place] laws. We do not discriminate on the basis of race, color, national origin, age, disability, sex, sexual orientation, or gender identity.            Thank you!     Thank you for choosing [facility]  for your care. Our goal is always to provide you with excellent care. Hearing back from our patients is one way we can continue to improve our services. Please take a few minutes to complete the written survey that you may receive in the mail after your visit with us. Thank you!             Your Updated Medication List - Protect others around you: Learn how to safely use, store and throw away your medicines at www.disposemymeds.org.          This list is accurate as of 6/18/18  4:44 PM.  Always use your most recent med list.                   Brand Name Dispense Instructions for use Diagnosis     acetaminophen 500 MG Caps      Take 1,000 mg by mouth nightly as needed Take 500-1,000 mg by mouth every 6 hours if needed.        ARTIFICIAL TEARS OP      Apply 1 drop to eye as needed        BENEFIBER Powd      2 teaspoons daily        calcitRIOL 0.5 MCG capsule    ROCALTROL    90 capsule    Take 1 capsule (0.5 mcg) by mouth daily    Postablative hypoparathyroidism (H)       clotrimazole 1 % cream    LOTRIMIN     Apply topically 2 times daily as needed Reported on 4/25/2017        ELIQUIS 2.5 MG tablet   Generic drug:  apixaban ANTICOAGULANT      Take 2.5 mg by mouth 2 times daily        estradiol 0.1 MG/GM cream    ESTRACE VAGINAL    42.5 g    Place 2 g vaginally twice a week    Atrophic vaginitis       ezetimibe 10 MG tablet    ZETIA    30 tablet    Take 10 mg by mouth every evening        folic acid 1 MG tablet    FOLVITE    100 tablet    Take 1 tablet (1 mg) by mouth daily    Liver replaced by transplant (H)       furosemide 20 MG tablet    LASIX    46 tablet    Take 0.5 tablets (10 mg) by mouth daily    CKD (chronic kidney disease) stage 3, GFR 30-59 ml/min, Liver replaced by transplant (H)       hydrALAZINE 25 MG tablet    APRESOLINE    270 tablet    Take 0.5 tablets (12.5 mg) by mouth 3 times daily as needed , if systolic blood pressure is more than 140 mmHg.    HTN (hypertension)       ketoconazole 2 % cream    NIZORAL    15 g    Apply topically 2 times daily To angles of mouth    Angular cheilitis       meclizine 25 MG tablet    ANTIVERT    30 tablet    Take 1 tablet (25 mg) by mouth as needed    Dizziness       metoprolol succinate 50 MG 24 hr tablet    TOPROL-XL    90 tablet    Take 1 tablet (50 mg) by mouth daily    Paroxysmal atrial fibrillation (H)       neomycin-polymyxin-hydrocortisone 3.5-07347-8 otic suspension    CORTISPORIN    10 mL    Place 4 drops Into the left ear 4 times daily    Acute swimmer's ear of left side       nitroFURantoin (macrocrystal-monohydrate) 100 MG capsule    MACROBID     14 capsule    Take 1 capsule (100 mg) by mouth 2 times daily For one week at onset of UTI symptoms    Urinary tract infection without hematuria, site unspecified       predniSONE 5 MG tablet    DELTASONE    90 tablet    Take 1 tablet (5 mg) by mouth daily    Thyroid cancer (H), Liver replaced by transplant (H)       PRESERVISION AREDS 2 Caps      Take 1 capsule by mouth 2 times daily        sertraline 50 MG tablet    ZOLOFT    30 tablet    Take 1 tablet (50 mg) by mouth daily    Adjustment disorder with depressed mood       sirolimus 0.5 MG Tabs tablet      Take 0.5 mg by mouth every other day        SUMAtriptan 50 MG tablet    IMITREX    30 tablet    Take 1 tablet (50 mg) by mouth at onset of headache for migraine repeat after 2 hours if needed.    Migraine without aura and without status migrainosus, not intractable       * SYNTHROID 150 MCG tablet   Generic drug:  levothyroxine      Take 225 mcg by mouth on Mondays        * levothyroxine 150 MCG tablet    SYNTHROID    120 tablet    Take 150mcg by mouth daily 6 days a week & 225mcg on Mondays    Malignant neoplasm of thyroid gland (H)       triamcinolone 0.1 % cream    KENALOG     Apply topically 2 times daily as needed for irritation        ursodiol 250 MG tablet    ACTIGALL    180 tablet    Take 1 tablet (250 mg) by mouth 2 times daily    Liver replaced by transplant (H)       voriconazole 200 MG tablet    VFEND    60 tablet    Take 1 tablet (200 mg) by mouth 2 times daily    Coccidioidal pneumonitis (H)       * Notice:  This list has 2 medication(s) that are the same as other medications prescribed for you. Read the directions carefully, and ask your doctor or other care provider to review them with you.

## 2018-06-19 ENCOUNTER — MYC MEDICAL ADVICE (OUTPATIENT)
Dept: PSYCHIATRY | Facility: CLINIC | Age: 68
End: 2018-06-19

## 2018-06-19 DIAGNOSIS — F43.21 ADJUSTMENT DISORDER WITH DEPRESSED MOOD: ICD-10-CM

## 2018-06-20 NOTE — TELEPHONE ENCOUNTER
Received RF request from patient for:    Sertraline 50 mg tab PO Q D    Last RF:  5/11/18    Due for RF:  yes    Appointment History:  Last appt: 5/4/18  RTC: 4-6 weeks  Cancel: none  No-show: none  Next appt: 7/10/18    Refilled 30 d/s per protocol.  Sent Rx electronically to the pharmacy.      Will route to LIZ Blake, for FYI and to address patient's comment about not sleeping well.

## 2018-06-25 ENCOUNTER — TELEPHONE (OUTPATIENT)
Dept: FAMILY MEDICINE | Facility: CLINIC | Age: 68
End: 2018-06-25

## 2018-06-25 ENCOUNTER — MYC MEDICAL ADVICE (OUTPATIENT)
Dept: FAMILY MEDICINE | Facility: CLINIC | Age: 68
End: 2018-06-25

## 2018-06-25 ENCOUNTER — E-VISIT (OUTPATIENT)
Dept: FAMILY MEDICINE | Facility: CLINIC | Age: 68
End: 2018-06-25
Payer: MEDICARE

## 2018-06-25 DIAGNOSIS — H35.3130 BILATERAL NONEXUDATIVE AGE-RELATED MACULAR DEGENERATION: Primary | ICD-10-CM

## 2018-06-25 DIAGNOSIS — H10.32 ACUTE CONJUNCTIVITIS OF LEFT EYE, UNSPECIFIED ACUTE CONJUNCTIVITIS TYPE: Primary | ICD-10-CM

## 2018-06-25 PROCEDURE — 99444 ZZC PHYSICIAN ONLINE EVALUATION & MANAGEMENT SERVICE: CPT | Performed by: FAMILY MEDICINE

## 2018-06-25 RX ORDER — TOBRAMYCIN 3 MG/ML
1 SOLUTION/ DROPS OPHTHALMIC EVERY 4 HOURS
Qty: 1 BOTTLE | Refills: 0 | Status: SHIPPED | OUTPATIENT
Start: 2018-06-25 | End: 2018-07-02

## 2018-06-25 NOTE — TELEPHONE ENCOUNTER
Routing to provider to review and advise. Pt submitted E-visit today around 12:00 pm. Awaiting for provider response, at this time.    Antionette Vidal RN  [facility] Triage

## 2018-06-25 NOTE — TELEPHONE ENCOUNTER
Patient notified by phone.  She will start an e-visit.    Martine Gonsalves, AURELIO, RN, N  [facility]) [phone]

## 2018-06-25 NOTE — TELEPHONE ENCOUNTER
Reason for Call:  Same Day Appointment, Requested Provider:     [facility]    PCP: [place]  Reason for visit: pt thinks she has pink eye would like to get workin    Duration of symptoms: few days    Have you been treated for this in the past? No    Additional comments: na    Can we leave a detailed message on this number? YES    Phone number patient can be reached at: Home number on file [phone] (home)    Best Time: any    Call taken on 6/25/2018 at 8:48 AM by Heidi Vaughn

## 2018-06-25 NOTE — PROGRESS NOTES
THORACIC SURGERY FOLLOW UP VISIT    I saw Ms. Luz Thompson in follow-up today. The clinical summary follows:   DIAGNOSIS   Lung Nodule   History of Cocciddiomycosis  HPI  Ms. Luz Thompson is a 68 year old year-old female who had chest CTs following coccidiomycosis in [place]. She was found to have a left upper lobe nodule. Attempted percutaneous lung biopsy in [place], at that time showed granulomatous inflammation? Unable to find this report.   8/8/2017 PET: SUV 2.6 lingular pulmonary nodule, small hilar lymph nodes with increased SVU   9/29/2017 EBUS/TBNA: sampling of Station 4L and 11L -> FNA shows granuloma   3/27/2018 CT: 1.3 cm nodule in the lingula, stable since previous    INTERVAL STUDIES  CT chest 06/27/18    ETOH: rare  TOB: former smoker (quit 1985)  BMI: Body mass index is 27.37 kg/(m^2).    SUBJECTIVE  Pt reports she is feeling well. She has now change in her medical history since she was seen 3 months ago. She denies any fevers, chills, SOB or chest pain or MIDDLETON    From a personal perspective, she cleary down in [place].     IMPRESSION R91.1) Lung nodule  (primary encounter diagnosis)  68 year old year-old female with a lingular lung nodule.    PLAN  I spent a total of 20 minutes with Ms. Luz Thompson, more than 50% of which were spent in counseling, coordination of care, and face-to-face time. I reviewed the plan as follows:  1 Lingular Lung Nodule.  Patient with history of coccidiomycosis.  Her CT today shows stability to the lung nodule when compared with imaging dating back for one year, however there was an increase in size when compared with April 2017 scan.  Follow up CT chest in 3 months.     All questions were answered and the patient and present family were in agreement with the plan.  I appreciate the opportunity to participate in the care of your patient and will keep you updated.  Sincerely,  LIZ Smith, CNP  Thoracic and Forgut Surgery  [facility]  Physicians

## 2018-06-26 DIAGNOSIS — H40.003 GLAUCOMA SUSPECT OF BOTH EYES: Primary | ICD-10-CM

## 2018-06-27 ENCOUNTER — OFFICE VISIT (OUTPATIENT)
Dept: OPHTHALMOLOGY | Facility: CLINIC | Age: 68
End: 2018-06-27
Attending: OPHTHALMOLOGY
Payer: MEDICARE

## 2018-06-27 ENCOUNTER — OFFICE VISIT (OUTPATIENT)
Dept: SURGERY | Facility: CLINIC | Age: 68
End: 2018-06-27
Attending: NURSE PRACTITIONER
Payer: MEDICARE

## 2018-06-27 ENCOUNTER — HOSPITAL ENCOUNTER (OUTPATIENT)
Dept: CT IMAGING | Facility: CLINIC | Age: 68
Discharge: HOME | End: 2018-06-27
Attending: NURSE PRACTITIONER | Admitting: NURSE PRACTITIONER
Payer: MEDICARE

## 2018-06-27 VITALS
BODY MASS INDEX: 26.9 KG/M2 | RESPIRATION RATE: 16 BRPM | OXYGEN SATURATION: 97 % | DIASTOLIC BLOOD PRESSURE: 74 MMHG | WEIGHT: 177.5 LBS | HEIGHT: 68 IN | TEMPERATURE: 99 F | SYSTOLIC BLOOD PRESSURE: 128 MMHG | HEART RATE: 81 BPM

## 2018-06-27 DIAGNOSIS — H10.012 ACUTE FOLLICULAR CONJUNCTIVITIS OF LEFT EYE: Primary | ICD-10-CM

## 2018-06-27 DIAGNOSIS — R91.1 LUNG NODULE: ICD-10-CM

## 2018-06-27 DIAGNOSIS — R91.1 LUNG NODULE: Primary | ICD-10-CM

## 2018-06-27 PROCEDURE — 71250 CT THORAX DX C-: CPT

## 2018-06-27 PROCEDURE — G0463 HOSPITAL OUTPT CLINIC VISIT: HCPCS | Mod: ZF

## 2018-06-27 ASSESSMENT — CONF VISUAL FIELD
OS_NORMAL: 1
OD_NORMAL: 1
METHOD: COUNTING FINGERS

## 2018-06-27 ASSESSMENT — VISUAL ACUITY
OS_SC+: -1
OD_SC: 20/25
METHOD: SNELLEN - LINEAR
OS_SC: 20/25

## 2018-06-27 ASSESSMENT — CUP TO DISC RATIO
OS_RATIO: 0.5
OD_RATIO: 0.5

## 2018-06-27 ASSESSMENT — EXTERNAL EXAM - LEFT EYE: OS_EXAM: NORMAL

## 2018-06-27 ASSESSMENT — TONOMETRY
OS_IOP_MMHG: 12
IOP_METHOD: TONOPEN
OD_IOP_MMHG: 14

## 2018-06-27 ASSESSMENT — EXTERNAL EXAM - RIGHT EYE: OD_EXAM: NORMAL

## 2018-06-27 ASSESSMENT — PAIN SCALES - GENERAL: PAINLEVEL: MODERATE PAIN (5)

## 2018-06-27 NOTE — NURSING NOTE
"Oncology Rooming Note    June 27, 2018 4:02 PM   Luz Thompson is a 68 year old female who presents for:    Chief Complaint   Patient presents with     Oncology Clinic Visit     Return : 3 month f/u Lung nodule     Initial Vitals: /74 (BP Location: Right arm, Patient Position: Sitting, Cuff Size: Adult Regular)  Pulse 81  Temp 99  F (37.2  C) (Oral)  Resp 16  Ht 1.715 m (5' 7.52\")  Wt 80.5 kg (177 lb 8 oz)  LMP 06/01/1988 (Approximate)  SpO2 97%  BMI 27.37 kg/m2 Estimated body mass index is 27.37 kg/(m^2) as calculated from the following:    Height as of this encounter: 1.715 m (5' 7.52\").    Weight as of this encounter: 80.5 kg (177 lb 8 oz). Body surface area is 1.96 meters squared.  Moderate Pain (5) Comment: left ear infection   Patient's last menstrual period was 06/01/1988 (approximate).  Allergies reviewed: Yes  Medications reviewed: Yes    Medications: Medication refills not needed today.  Pharmacy name entered into edo: [id number] [place], [place] - [address]    Clinical concerns: None. Samantha Pinto was NOT notified.    10 minutes for nursing intake (face to face time)     Brenda Mei CMA              "

## 2018-06-27 NOTE — PROGRESS NOTES
HPI  Luz Thompson is a 68 year old female here for evaluation of left eye redness, irritation, and discharge that started over the last couple of days. She also has an ear infection on that side and is taking oral clindamycin. She is immunosuppressed. The vision is overall good and she denies flashes/floaters.     Because of the pink eye, she'd like to defer her glaucoma suspect follow-up.    Assessment & Plan    (H10.012) Acute follicular conjunctivitis of left eye  (primary encounter diagnosis)  Comment: Likely viral, but given immunosuppressed status, ok to continue the tobramycin gtts given by her PCP.  Plan: Tobramycin 1 drop 4x daily left eye.  Discussed importance of hand hygiene  Cool compresses and ATs for comfort.    (H40.003) Glaucoma suspect of both eyes  (primary encounter diagnosis)  Comment: Based on asymmetric disc appearance with thin temporal rim left eye    FH: + grandfather  Pachymetry: 577/579 (2016)  Gonioscopy:  Tmax: High teens OU  Today's IOP: 14/12  Target IOP:  Current medications: None  Meds to avoid: None  Visual field: OVF 24-2 (7/12/17)  OD: Few depressed points on pattern deviation, MD -4.0, PSD 2.8  OS: Few depressed points on pattern deviation, MD -3.6, PSD 3.8  Nerve OCT: Spectralis optic nerve OCT (7/12/17)  OD: Avg RNFL thickness 89, all green  OS: Avg RNFL thickness 84, superior red, rest green   - stable    Normal IOPs today.    Plan: Observe for now. Repeat OVF 24-2 and nerve OCT at next visit.    (H35.31) Age-related macular degeneration, dry, both eyes  Comment: Followed by Dr. Frost, seen today. Bilateral drusen. No heme or SRF.  Plan: Continue AREDS2 and Amsler grid monitoring    (H35.371) Epiretinal membrane, right eye  Comment: Mild  Plan: Observe    (Z96.1) Pseudophakia, both eyes  Comment: Clear visual axis  Plan: Observe    (H04.123) Dry eyes, bilateral  Comment: Mild  Plan: Continue ATs 2-3 x daily      -----------------------------------------------------------------------------------    Patient disposition:   Return in about 4 weeks (around 7/25/2018) for applanation IOP, OVF 24-2, dilation, nerve OCT OU. or sooner as needed.    Teaching statement:  Complete documentation of historical and exam elements from today's encounter can be found in the full encounter summary report (not reduplicated in this progress note). I personally obtained the chief complaint(s) and history of present illness.  I confirmed and edited as necessary the review of systems, past medical/surgical history, family history, social history, and examination findings as documented by others; and I examined the patient myself. I personally reviewed the relevant tests, images, and reports as documented above.     I formulated and edited as necessary the assessment and plan and discussed the findings and management plan with the patient and family.      Dora Tovar MD  Comprehensive Ophthalmology & Ocular Pathology  Department of Ophthalmology and Visual Neurosciences  [email or web address]  Pager [phone]

## 2018-06-27 NOTE — MR AVS SNAPSHOT
After Visit Summary   6/27/2018    Luz Thompson    MRN: [id number]           Patient Information     Date Of Birth          1950        Visit Information        Provider Department      6/27/2018 3:45 PM Samantha Pinto APRN [facility]        Today's Diagnoses     Lung nodule    -  1       Follow-ups after your visit        Follow-up notes from your care team     Return in about 3 months (around 9/27/2018).      Your next 10 appointments already scheduled     Jun 29, 2018  9:00 AM CDT   Return Visit with Randell Mejia MD   [facility] ([facility])    [address] [place] [address]   [phone]            Jun 29, 2018 11:30 AM CDT   (Arrive by 11:15 AM)   Return Visit with Britt Oswald NP   [facility] ([facility])    [address]  [address]  [facility] [address]   [phone]            Jul 10, 2018 10:45 AM CDT   Adult Med Follow UP with LIZ Nesbitt CNS   Psychiatry Clinic ([facility])    [address]  [address] [address]   [phone]            Jul 11, 2018  1:30 PM CDT   RETURN GENERAL with Dora Tovar MD   Eye Clinic ([facility])    [address]  [address] Clin 9a  [facility] [address]   [phone]            Jul 11, 2018  2:30 PM CDT   RETURN RETINA with Meri Frost MD   Eye Clinic ([facility])    [address] Clin [address]a  [facility] [address]   [phone]            Jul 25, 2018  9:30 AM CDT   (Arrive by 9:15 AM)   Return Visit with Crystal Montero MD   [facility] ([facility])    [address]  [address]  [facility] [address]   [phone]            Jul 26, 2018  8:00 PM CDT    PSG Split with SLEEP STUDY RM 3   [facility] [place], Sleep Study ([facility])    [address] [place] [id number] [phone]            Aug 06, 2018  9:00 AM CDT   (Arrive by 8:45 AM)   RETURN ENDOCRINE with Rach Vasquez MD   [facility] ([facility])    [address]  3rd Floor  [facility] [id number] [phone]            Aug 13, 2018  1:00 PM CDT   Return Sleep Patient with Vero Quiñonez MD   [facility] [place], Sleep Study ([facility])    [address] [place] [address] [phone]            Aug 15, 2018 11:30 AM CDT   (Arrive by 11:15 AM)   RETURN ATRIAL FIBULATION VISIT with LIZ Sauceda [facility] ([facility] [place])    [address]  [address] [place] [id number] [phone]              Future tests that were ordered for you today     Open Future Orders        Priority Expected Expires Ordered    CT Chest w/o contrast Routine  6/27/2019 6/27/2018    OVF 24-2 Dynamic OU Routine  12/27/2019 6/26/2018    OCT Optic Nerve RNFL Spectralis OU (both eyes) Routine  12/26/2019 6/26/2018            Who to contact     If you have questions or need follow up information about today's clinic visit or your schedule please contact [facility] directly at [phone].  Normal or non-critical lab and imaging results will be communicated to you by MyChart, letter or phone within 4 business days after the clinic has received the results. If you do not hear from us within 7 days, please contact the clinic through MyChart or phone. If you have a critical or abnormal lab result, we will notify you by phone as soon as possible.  Submit refill requests through [employer] or call your pharmacy and they will forward the refill request to us. Please allow 3  "business days for your refill to be completed.          Additional Information About Your Visit        MyChart Information     [employer] gives you secure access to your electronic health record. If you see a primary care provider, you can also send messages to your care team and make appointments. If you have questions, please call your primary care clinic.  If you do not have a primary care provider, please call [phone] and they will assist you.        Care EveryWhere ID     This is your Care EveryWhere ID. This could be used by other organizations to access your [place] medical records  [id number]        Your Vitals Were     Pulse Temperature Respirations Height Last Period Pulse Oximetry    81 99  F (37.2  C) (Oral) 16 1.715 m (5' 7.52\") 06/01/1988 (Approximate) 97%    BMI (Body Mass Index)                   27.37 kg/m2            Blood Pressure from Last 3 Encounters:   06/27/18 128/74   06/18/18 158/72   06/01/18 154/69    Weight from Last 3 Encounters:   06/27/18 80.5 kg (177 lb 8 oz)   06/18/18 78.9 kg (174 lb)   06/01/18 79.4 kg (175 lb)               Primary Care Provider Office Phone # Fax #    Jennifer Amparo Barraza [phone]       [address] [place]  [facility] [address]        Equal Access to Services     [facility]: Hadii aad ku hadasho Soomaali, waaxda luqadaha, qaybta kaalmada adeegyada, etta lu . So [facility] [phone].    ATENCIÓN: Si habla español, tiene a jason disposición servicios gratuitos de asistencia lingüística. Llame al [phone].    We comply with applicable federal civil rights laws and [place] laws. We do not discriminate on the basis of race, color, national origin, age, disability, sex, sexual orientation, or gender identity.            Thank you!     Thank you for choosing [facility]  for your care. Our goal is always to provide you with excellent care. Hearing back from our patients is one way we can " continue to improve our services. Please take a few minutes to complete the written survey that you may receive in the mail after your visit with us. Thank you!             Your Updated Medication List - Protect others around you: Learn how to safely use, store and throw away your medicines at www.disposemymeds.org.          This list is accurate as of 6/27/18  4:31 PM.  Always use your most recent med list.                   Brand Name Dispense Instructions for use Diagnosis    acetaminophen 500 MG Caps      Take 1,000 mg by mouth nightly as needed Take 500-1,000 mg by mouth every 6 hours if needed.        ARTIFICIAL TEARS OP      Apply 1 drop to eye as needed        BENEFIBER Powd      2 teaspoons daily        calcitRIOL 0.5 MCG capsule    ROCALTROL    90 capsule    Take 1 capsule (0.5 mcg) by mouth daily    Postablative hypoparathyroidism (H)       clotrimazole 1 % cream    LOTRIMIN     Apply topically 2 times daily as needed Reported on 4/25/2017        ELIQUIS 2.5 MG tablet   Generic drug:  apixaban ANTICOAGULANT      Take 2.5 mg by mouth 2 times daily        estradiol 0.1 MG/GM cream    ESTRACE VAGINAL    42.5 g    Place 2 g vaginally twice a week    Atrophic vaginitis       ezetimibe 10 MG tablet    ZETIA    30 tablet    Take 10 mg by mouth every evening        folic acid 1 MG tablet    FOLVITE    100 tablet    Take 1 tablet (1 mg) by mouth daily    Liver replaced by transplant (H)       furosemide 20 MG tablet    LASIX    46 tablet    Take 0.5 tablets (10 mg) by mouth daily    CKD (chronic kidney disease) stage 3, GFR 30-59 ml/min, Liver replaced by transplant (H)       hydrALAZINE 25 MG tablet    APRESOLINE    270 tablet    Take 0.5 tablets (12.5 mg) by mouth 3 times daily as needed , if systolic blood pressure is more than 140 mmHg.    HTN (hypertension)       ketoconazole 2 % cream    NIZORAL    15 g    Apply topically 2 times daily To angles of mouth    Angular cheilitis       meclizine 25 MG tablet     ANTIVERT    30 tablet    Take 1 tablet (25 mg) by mouth as needed    Dizziness       metoprolol succinate 50 MG 24 hr tablet    TOPROL-XL    90 tablet    Take 1 tablet (50 mg) by mouth daily    Paroxysmal atrial fibrillation (H)       neomycin-polymyxin-hydrocortisone 3.5-23236-7 otic suspension    CORTISPORIN    10 mL    Place 4 drops Into the left ear 4 times daily    Acute swimmer's ear of left side       nitroFURantoin (macrocrystal-monohydrate) 100 MG capsule    MACROBID    14 capsule    Take 1 capsule (100 mg) by mouth 2 times daily For one week at onset of UTI symptoms    Urinary tract infection without hematuria, site unspecified       predniSONE 5 MG tablet    DELTASONE    90 tablet    Take 1 tablet (5 mg) by mouth daily    Thyroid cancer (H), Liver replaced by transplant (H)       PRESERVISION AREDS 2 Caps      Take 1 capsule by mouth 2 times daily        sertraline 50 MG tablet    ZOLOFT    30 tablet    Take 1 tablet (50 mg) by mouth daily    Adjustment disorder with depressed mood       sirolimus 0.5 MG Tabs tablet      Take 0.5 mg by mouth every other day        SUMAtriptan 50 MG tablet    IMITREX    30 tablet    Take 1 tablet (50 mg) by mouth at onset of headache for migraine repeat after 2 hours if needed.    Migraine without aura and without status migrainosus, not intractable       * SYNTHROID 150 MCG tablet   Generic drug:  levothyroxine      Take 225 mcg by mouth on Mondays        * levothyroxine 150 MCG tablet    SYNTHROID    120 tablet    Take 150mcg by mouth daily 6 days a week & 225mcg on Mondays    Malignant neoplasm of thyroid gland (H)       tobramycin 0.3 % ophthalmic solution    TOBREX    1 Bottle    Apply 1 drop to eye every 4 hours for 7 days    Acute conjunctivitis of left eye, unspecified acute conjunctivitis type       triamcinolone 0.1 % cream    KENALOG     Apply topically 2 times daily as needed for irritation        ursodiol 250 MG tablet    ACTIGALL    180 tablet    Take 1  tablet (250 mg) by mouth 2 times daily    Liver replaced by transplant (H)       voriconazole 200 MG tablet    VFEND    60 tablet    Take 1 tablet (200 mg) by mouth 2 times daily    Coccidioidal pneumonitis (H)       * Notice:  This list has 2 medication(s) that are the same as other medications prescribed for you. Read the directions carefully, and ask your doctor or other care provider to review them with you.

## 2018-06-27 NOTE — LETTER
6/27/2018       RE: Luz Thompson  [address]  [facility] [address]     Dear Colleague,    Thank you for referring your patient, Luz Thompson, to the [facility]. Please see a copy of my visit note below.    THORACIC SURGERY FOLLOW UP VISIT    I saw Ms. Luz Thompson in follow-up today. The clinical summary follows:   DIAGNOSIS   Lung Nodule   History of Cocciddiomycosis  HPI  Ms. Luz Thompson is a 68 year old year-old female who had chest CTs following coccidiomycosis in [place]. She was found to have a left upper lobe nodule. Attempted percutaneous lung biopsy in [place], at that time showed granulomatous inflammation? Unable to find this report.   8/8/2017 PET: SUV 2.6 lingular pulmonary nodule, small hilar lymph nodes with increased SVU   9/29/2017 EBUS/TBNA: sampling of Station 4L and 11L -> FNA shows granuloma   3/27/2018 CT: 1.3 cm nodule in the lingula, stable since previous    INTERVAL STUDIES  CT chest 06/27/18    ETOH: rare  TOB: former smoker (quit 1985)  BMI: Body mass index is 27.37 kg/(m^2).    SUBJECTIVE  Pt reports she is feeling well. She has now change in her medical history since she was seen 3 months ago. She denies any fevers, chills, SOB or chest pain or MIDDLETON    From a personal perspective, she cleary down in [place].     IMPRESSION R91.1) Lung nodule  (primary encounter diagnosis)  68 year old year-old female with a lingular lung nodule.    PLAN  I spent a total of 20 minutes with Ms. Luz Thompson, more than 50% of which were spent in counseling, coordination of care, and face-to-face time. I reviewed the plan as follows:  1 Lingular Lung Nodule.  Patient with history of coccidiomycosis.  Her CT today shows stability to the lung nodule when compared with imaging dating back for one year, however there was an increase in size when compared with April 2017 scan.  Follow up CT chest in 3 months.     All questions were answered and the  patient and present family were in agreement with the plan.  I appreciate the opportunity to participate in the care of your patient and will keep you updated.  Sincerely,  LIZ Smith, CNP  Thoracic and Forgut Surgery  [facility]

## 2018-06-27 NOTE — MR AVS SNAPSHOT
After Visit Summary   6/27/2018    Luz Thompson    MRN: [id number]           Patient Information     Date Of Birth          1950        Visit Information        Provider Department      6/27/2018 12:30 PM Dora Tovar MD Eye Clinic         Follow-ups after your visit        Your next 10 appointments already scheduled     Jun 27, 2018  1:45 PM CDT   RETURN RETINA with Meri Frost MD   Eye Clinic ([facility])    [address]  [address] Clin 9a  [facility] [id number] [phone]            Jun 27, 2018  2:40 PM CDT   CT CHEST W/O CONTRAST with UUCT1   [facility] [place], CT ([facility])    [address] [facility] [id number] [phone]           Please bring any scans or X-rays taken at other hospitals, if similar tests were done. Also bring a list of your medicines, including vitamins, minerals and over-the-counter drugs. It is safest to leave personal items at home.  Be sure to tell your doctor:   If you have any allergies.   If there s any chance you are pregnant.   If you are breastfeeding.  You do not need to do anything special to prepare for this exam.  Please wear loose clothing, such as a sweat suit or jogging clothes. Avoid snaps, zippers and other metal. We may ask you to undress and put on a hospital gown.            Jun 27, 2018  3:45 PM CDT   (Arrive by 3:30 PM)   Return Visit with LIZ Gamboa [facility] ([facility])    [address] [facility] [address]  [address]  [facility] [id number] [phone]            Jun 29, 2018  9:00 AM CDT   Return Visit with Randell Mejia MD   [facility]    [address] [place] [id number] [phone]            Jun 29, 2018 11:30 AM CDT   (Arrive by 11:15 AM)   Return Visit with WARREN Desai  [facility] ([facility] [place])    [address]  [address]  [facility] [address]   [phone]            Jul 10, 2018 10:45 AM CDT   Adult Med Follow UP with LIZ Nesbitt CNS   Psychiatry Clinic ([facility])    [facility]  2nd [address]  [address] [address] [phone]            Jul 11, 2018  1:30 PM CDT   RETURN GENERAL with Dora Tovar MD   Eye Clinic ([facility])    [place] [address] [facility]  [address] Clin 9a  [facility] [address]   [phone]            Jul 11, 2018  2:30 PM CDT   RETURN RETINA with Meri Frost MD   Eye Clinic ([facility])    [place] [address] [facility]  [address] Clin 9a  [facility] [address]   [phone]            Jul 25, 2018  9:30 AM CDT   (Arrive by 9:15 AM)   Return Visit with Crystal Montero MD   [facility] Infectious Diseases ([facility])    [address]  [address]  [facility] [address] [phone]            Jul 26, 2018  8:00 PM CDT   PSG Split with SLEEP STUDY  3   [facility] [place], Sleep Study ([facility])    [address] [place] [id number] [phone]              Future tests that were ordered for you today     Open Future Orders        Priority Expected Expires Ordered    OVF 24-2 Dynamic OU Routine  12/27/2019 6/26/2018    OCT Optic Nerve RNFL Spectralis OU (both eyes) Routine  12/26/2019 6/26/2018            Who to contact     Please call your clinic at [phone] to:    Ask questions about your health    Make or cancel appointments    Discuss your medicines    Learn about your test results    Speak to your doctor            Additional Information About Your Visit        MyChart Information     MyChart gives you secure access to your electronic health record. If you see  a primary care provider, you can also send messages to your care team and make appointments. If you have questions, please call your primary care clinic.  If you do not have a primary care provider, please call [phone] and they will assist you.      [employer] is an electronic gateway that provides easy, online access to your medical records. With [employer], you can request a clinic appointment, read your test results, renew a prescription or communicate with your care team.     To access your existing account, please contact your [facility] or call [phone] for assistance.        Care EveryWhere ID     This is your Care EveryWhere ID. This could be used by other organizations to access your [place] medical records  [id number]        Your Vitals Were     Last Period                   06/01/1988 (Approximate)            Blood Pressure from Last 3 Encounters:   06/18/18 158/72   06/01/18 154/69   06/01/18 150/82    Weight from Last 3 Encounters:   06/18/18 78.9 kg (174 lb)   06/01/18 79.4 kg (175 lb)   06/01/18 79.8 kg (176 lb)              Today, you had the following     No orders found for display       Primary Care Provider Office Phone # Fax #    Jennifer Amparo Barraza [phone]       [address]  [facility] [address]        Equal Access to Services     CARLA NOWAK : Hadii aad ku hadasho Soomaali, waaxda luqadaha, qaybta kaalmada adeegyada, waxaudrey hardy. So [facility] [phone].    ATENCIÓN: Si habla español, tiene a jason disposición servicios gratuitos de asistencia lingüística. Llame al [phone].    We comply with applicable federal civil rights laws and [place] laws. We do not discriminate on the basis of race, color, national origin, age, disability, sex, sexual orientation, or gender identity.            Thank you!     Thank you for choosing EYE CLINIC  for your care. Our goal is always to provide you with excellent  care. Hearing back from our patients is one way we can continue to improve our services. Please take a few minutes to complete the written survey that you may receive in the mail after your visit with us. Thank you!             Your Updated Medication List - Protect others around you: Learn how to safely use, store and throw away your medicines at www.disposemymeds.org.          This list is accurate as of 6/27/18  1:28 PM.  Always use your most recent med list.                   Brand Name Dispense Instructions for use Diagnosis    acetaminophen 500 MG Caps      Take 1,000 mg by mouth nightly as needed Take 500-1,000 mg by mouth every 6 hours if needed.        ARTIFICIAL TEARS OP      Apply 1 drop to eye as needed        BENEFIBER Powd      2 teaspoons daily        calcitRIOL 0.5 MCG capsule    ROCALTROL    90 capsule    Take 1 capsule (0.5 mcg) by mouth daily    Postablative hypoparathyroidism (H)       clotrimazole 1 % cream    LOTRIMIN     Apply topically 2 times daily as needed Reported on 4/25/2017        ELIQUIS 2.5 MG tablet   Generic drug:  apixaban ANTICOAGULANT      Take 2.5 mg by mouth 2 times daily        estradiol 0.1 MG/GM cream    ESTRACE VAGINAL    42.5 g    Place 2 g vaginally twice a week    Atrophic vaginitis       ezetimibe 10 MG tablet    ZETIA    30 tablet    Take 10 mg by mouth every evening        folic acid 1 MG tablet    FOLVITE    100 tablet    Take 1 tablet (1 mg) by mouth daily    Liver replaced by transplant (H)       furosemide 20 MG tablet    LASIX    46 tablet    Take 0.5 tablets (10 mg) by mouth daily    CKD (chronic kidney disease) stage 3, GFR 30-59 ml/min, Liver replaced by transplant (H)       hydrALAZINE 25 MG tablet    APRESOLINE    270 tablet    Take 0.5 tablets (12.5 mg) by mouth 3 times daily as needed , if systolic blood pressure is more than 140 mmHg.    HTN (hypertension)       ketoconazole 2 % cream    NIZORAL    15 g    Apply topically 2 times daily To angles of mouth     Angular cheilitis       meclizine 25 MG tablet    ANTIVERT    30 tablet    Take 1 tablet (25 mg) by mouth as needed    Dizziness       metoprolol succinate 50 MG 24 hr tablet    TOPROL-XL    90 tablet    Take 1 tablet (50 mg) by mouth daily    Paroxysmal atrial fibrillation (H)       neomycin-polymyxin-hydrocortisone 3.5-73073-3 otic suspension    CORTISPORIN    10 mL    Place 4 drops Into the left ear 4 times daily    Acute swimmer's ear of left side       nitroFURantoin (macrocrystal-monohydrate) 100 MG capsule    MACROBID    14 capsule    Take 1 capsule (100 mg) by mouth 2 times daily For one week at onset of UTI symptoms    Urinary tract infection without hematuria, site unspecified       predniSONE 5 MG tablet    DELTASONE    90 tablet    Take 1 tablet (5 mg) by mouth daily    Thyroid cancer (H), Liver replaced by transplant (H)       PRESERVISION AREDS 2 Caps      Take 1 capsule by mouth 2 times daily        sertraline 50 MG tablet    ZOLOFT    30 tablet    Take 1 tablet (50 mg) by mouth daily    Adjustment disorder with depressed mood       sirolimus 0.5 MG Tabs tablet      Take 0.5 mg by mouth every other day        SUMAtriptan 50 MG tablet    IMITREX    30 tablet    Take 1 tablet (50 mg) by mouth at onset of headache for migraine repeat after 2 hours if needed.    Migraine without aura and without status migrainosus, not intractable       * SYNTHROID 150 MCG tablet   Generic drug:  levothyroxine      Take 225 mcg by mouth on Mondays        * levothyroxine 150 MCG tablet    SYNTHROID    120 tablet    Take 150mcg by mouth daily 6 days a week & 225mcg on Mondays    Malignant neoplasm of thyroid gland (H)       tobramycin 0.3 % ophthalmic solution    TOBREX    1 Bottle    Apply 1 drop to eye every 4 hours for 7 days    Acute conjunctivitis of left eye, unspecified acute conjunctivitis type       triamcinolone 0.1 % cream    KENALOG     Apply topically 2 times daily as needed for irritation        ursodiol  250 MG tablet    ACTIGALL    180 tablet    Take 1 tablet (250 mg) by mouth 2 times daily    Liver replaced by transplant (H)       voriconazole 200 MG tablet    VFEND    60 tablet    Take 1 tablet (200 mg) by mouth 2 times daily    Coccidioidal pneumonitis (H)       * Notice:  This list has 2 medication(s) that are the same as other medications prescribed for you. Read the directions carefully, and ask your doctor or other care provider to review them with you.

## 2018-06-29 ENCOUNTER — OFFICE VISIT (OUTPATIENT)
Dept: OTOLARYNGOLOGY | Facility: CLINIC | Age: 68
End: 2018-06-29
Payer: MEDICARE

## 2018-06-29 VITALS
HEART RATE: 76 BPM | HEIGHT: 68 IN | DIASTOLIC BLOOD PRESSURE: 76 MMHG | SYSTOLIC BLOOD PRESSURE: 152 MMHG | RESPIRATION RATE: 12 BRPM | WEIGHT: 175 LBS | BODY MASS INDEX: 26.52 KG/M2

## 2018-06-29 DIAGNOSIS — H90.3 SENSORINEURAL HEARING LOSS (SNHL) OF BOTH EARS: Primary | ICD-10-CM

## 2018-06-29 DIAGNOSIS — H60.542 ACUTE ECZEMATOID OTITIS EXTERNA OF LEFT EAR: ICD-10-CM

## 2018-06-29 PROCEDURE — 99214 OFFICE O/P EST MOD 30 MIN: CPT | Performed by: OTOLARYNGOLOGY

## 2018-06-29 RX ORDER — CIPROFLOXACIN AND DEXAMETHASONE 3; 1 MG/ML; MG/ML
4 SUSPENSION/ DROPS AURICULAR (OTIC) 2 TIMES DAILY
Qty: 4 ML | Refills: 1 | Status: SHIPPED | OUTPATIENT
Start: 2018-06-29 | End: 2018-07-10

## 2018-06-29 NOTE — PROGRESS NOTES
History of Present Illness - Luz Thompson is a 68 year old female just recently seen on 6/1/2018, added on today due to some acute ear issues.    To review, she has had LEFT ear surgery with a tympanoplasty in 1996.  Otherwise no chronic ear disease.  She has also had thyroid surgery and ablation in March 2010.  The main reason she is here is progressive bilateral ear fullness and blockage of her hearing aids with cerumen.    At the March 2014 visit, she had been through quite an ordeal, having been hospitalized for E. Coli sepsis, the source was unknown. No new issues with that problem since then. And after asking her about it, it happened again this past June of 2015. Her past year had been fine, and other than fullness from her cerumen build up, no new ENT issues. No drainage from the ears, no bleeding, no vertigo. She still uses bilateral hearing aids.    About one week ago she noted some drainage from the LEFT ear.  She went to urgent care and was placed on some antibiotics.  Of note, she also got some pink eye.  However, she was not given drops because of a concern for drug interaction.  However, she was then placed on oral antibiotics.      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     Prophylactic antibiotic     High risk medication use     Statin intolerance     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     Coccidioidal pneumonitis (H)     SNHL (sensorineural hearing loss)     Abnormal liver function tests     Diagnostic skin and sensitization tests     Perforation bowel (H)     Liver replaced by transplant (H)     Adjustment disorder with depressed mood     Type 2 diabetes, HbA1c goal < 7% (H)     Hyperlipidemia LDL goal <70     Hypertension goal BP (blood pressure) < 140/80        Current Medications -   Current Outpatient Prescriptions:      acetaminophen 500 MG CAPS, Take 1,000 mg by mouth nightly as needed Take 500-1,000 mg by mouth every 6 hours if needed. , Disp: , Rfl:      calcitRIOL (ROCALTROL) 0.5 MCG capsule, Take 1 capsule (0.5 mcg) by mouth daily, Disp: 90 capsule, Rfl: 0     clotrimazole (LOTRIMIN) 1 % cream, Apply topically 2 times daily as needed Reported on 4/25/2017, Disp: , Rfl:      ELIQUIS 2.5 MG tablet, Take 2.5 mg by mouth 2 times daily , Disp: , Rfl:      estradiol (ESTRACE VAGINAL) 0.1 MG/GM cream, Place 2 g vaginally twice a week, Disp: 42.5 g, Rfl: 11     ezetimibe (ZETIA) 10 MG tablet, Take 10 mg by mouth every evening , Disp: 30 tablet, Rfl: 0     folic acid (FOLVITE) 1 MG tablet, Take 1 tablet (1 mg) by mouth daily, Disp: 100 tablet, Rfl: 3     furosemide (LASIX) 20 MG tablet, Take 0.5 tablets (10 mg) by mouth daily, Disp: 46 tablet, Rfl: 3     hydrALAZINE (APRESOLINE) 25 MG tablet, Take 0.5 tablets (12.5 mg) by mouth 3 times daily as needed , if systolic blood pressure is more than 140 mmHg., Disp: 270 tablet, Rfl: 3     Hypromellose (ARTIFICIAL TEARS OP), Apply 1 drop to eye as needed, Disp: , Rfl:      ketoconazole (NIZORAL) 2 % cream, Apply topically 2 times daily To angles of mouth, Disp: 15 g, Rfl: 0     levothyroxine (SYNTHROID) 150 MCG tablet, Take 150mcg by mouth daily 6 days a week & 225mcg on Mondays, Disp: 120 tablet, Rfl: 0     levothyroxine (SYNTHROID) 150 MCG tablet, Take 225 mcg by mouth on Mondays, Disp: , Rfl:      meclizine (ANTIVERT) 25 MG tablet, Take 1 tablet (25 mg) by mouth as needed, Disp: 30 tablet, Rfl: 11     metoprolol (TOPROL-XL) 50 MG 24 hr tablet, Take 1 tablet (50 mg) by mouth daily, Disp: 90 tablet, Rfl: 3     Multiple Vitamins-Minerals (PRESERVISION AREDS 2) CAPS, Take 1 capsule by mouth 2 times daily, Disp: , Rfl:      neomycin-polymyxin-hydrocortisone (CORTISPORIN) 3.5-90394-7 otic suspension, Place 4 drops Into the left  ear 4 times daily (Patient not taking: Reported on 6/27/2018), Disp: 10 mL, Rfl: 0     nitroFURantoin, macrocrystal-monohydrate, (MACROBID) 100 MG capsule, Take 1 capsule (100 mg) by mouth 2 times daily For one week at onset of UTI symptoms, Disp: 14 capsule, Rfl: 6     predniSONE (DELTASONE) 5 MG tablet, Take 1 tablet (5 mg) by mouth daily, Disp: 90 tablet, Rfl: 3     RAPAMUNE (BRAND) 0.5 MG PO TABLET, Take 0.5 mg by mouth every other day, Disp: , Rfl:      sertraline (ZOLOFT) 50 MG tablet, Take 1 tablet (50 mg) by mouth daily, Disp: 30 tablet, Rfl: 0     SUMAtriptan (IMITREX) 50 MG tablet, Take 1 tablet (50 mg) by mouth at onset of headache for migraine repeat after 2 hours if needed., Disp: 30 tablet, Rfl: 3     tobramycin (TOBREX) 0.3 % ophthalmic solution, Apply 1 drop to eye every 4 hours for 7 days, Disp: 1 Bottle, Rfl: 0     triamcinolone (KENALOG) 0.1 % cream, Apply topically 2 times daily as needed for irritation, Disp: , Rfl:      ursodiol (ACTIGALL) 250 MG tablet, Take 1 tablet (250 mg) by mouth 2 times daily, Disp: 180 tablet, Rfl: 3     voriconazole (VFEND) 200 MG tablet, Take 1 tablet (200 mg) by mouth 2 times daily, Disp: 60 tablet, Rfl: 2     Wheat Dextrin (BENEFIBER) POWD, 2 teaspoons daily, Disp: , Rfl:     Allergies -   Allergies   Allergen Reactions     Fluconazole Hives and Itching     Ciprofloxacin Anxiety and Other (See Comments)     Irregular heart beat     Azithromycin Itching     Benadryl [Diphenhydramine Hcl]      Insomnia      Cellcept Diarrhea     Ciprofloxacin Other (See Comments)     Insomnia, mood lability     Codeine      Psych disturbance     Codeine      Diphenhydramine Other (See Comments)     Doxycycline      Lansoprazole Diarrhea     Levaquin [Levofloxacin] Other (See Comments)     Headache, hyperactivity     Lisinopril Cough     Methotrexate      Sores     Methotrexate      Morphine Sulfate Itching     Mycophenolate Diarrhea     No Clinical Screening - See Comments       "Simvastatin Muscle Pain (Myalgia)     severe     Cephalexin Rash     Fever and skin burning     Penicillin G Itching and Rash     Tolectin [Nsaids] Rash     Tramadol Rash       Social History -   History     Social History     Marital Status:      Spouse Name: Robbin Thompson     Number of Children: 4     Years of Education: 20     Occupational History     Guardian Conservator  [employer]      Self     Social History Main Topics     Smoking status: Former Smoker -- 1.00 packs/day for 18 years     Types: Cigarettes     Quit date: 1985     Smokeless tobacco: Never Used     Alcohol Use: Yes      Comment: rare - \"I toast at weddings\"     Drug Use: No     Sexual Activity:     Partners: Male     Birth Control/ Protection: Post-menopausal     Other Topics Concern     Exercise Yes     cardio and strengthing     Social History Narrative    She is retired. She and her  are traveling around the [place] in an RV. They are planning to be in [place] for the winter.       Family History -   Family History   Problem Relation Age of Onset     Hypertension Mother      Endometrial Cancer Mother      Hyperlipidemia Mother      Prostate Cancer Father      Macular Degeneration Father      Cancer - colorectal Maternal Grandmother      in her 80's, has surgery and removal of part of kidney,  at age 98     HEART DISEASE Maternal Grandfather       at 98     Glaucoma Maternal Grandfather      Cerebrovascular Disease Paternal Grandmother      in her 80's     Hypertension Paternal Grandmother      HEART DISEASE Paternal Grandfather      MI     Alzheimer Disease Paternal Grandfather      Allergies Son      Neurologic Disorder Daughter      Migraines     Breast Cancer Other      Anesthesia Reaction No family hx of      Crohn Disease No family hx of      Ulcerative Colitis No family hx of        Review of Systems - As per HPI and PMHx, otherwise 7 system review of the head and neck " negative.    Physical Exam  LMP 06/01/1988 (Approximate)    General - The patient is well nourished and well developed, and appears to have good nutritional status.  Alert and oriented to person and place, answers questions and cooperates with examination appropriately.   Head and Face - Normocephalic and atraumatic, with no gross asymmetry noted of the contour of the facial features.  The facial nerve is intact, with strong symmetric movements.  Voice and Breathing - The patient was breathing comfortably without the use of accessory muscles. There was no wheezing, stridor, or stertor.  The patients voice was clear and strong, and had appropriate pitch and quality.  Eyes - Extraocular movements intact, and the pupils were reactive to light.  Sclera were not icteric or injected, conjunctiva were pink and moist.  Mouth - Examination of the oral cavity showed pink, healthy oral mucosa. No lesions or ulcerations noted.  The tongue was mobile and midline, and the dentition were in good condition.    Throat - The walls of the oropharynx were smooth, pink, moist, symmetric, and had no lesions or ulcerations.  The tonsillar pillars and soft palate were symmetric.  The uvula was midline on elevation.    Ears - The tympanic membrane on the RIGHT is normal.  The LEFT canal was full of purulence and the canal and meatus are already looking cellulitic.  I suctioned all the purulence, and there is a 10% perforation in the tympanic membrane on the LEFT as well.        A/P - Luz Thompson is a 68 year old female  (H90.5) SNHL (sensorineural hearing loss)  (primary encounter diagnosis)  (H61.23) Bilateral impacted cerumen    Continue the clindamycin, but I will add ciprodex    See me in two weeks for close u on the infection and LEFT perforation.

## 2018-06-29 NOTE — MR AVS SNAPSHOT
After Visit Summary   6/29/2018    Luz Thompson    MRN: [id number]           Patient Information     Date Of Birth          1950        Visit Information        Provider Department      6/29/2018 9:00 AM Randell Mejia MD [facility]        Today's Diagnoses     Sensorineural hearing loss (SNHL) of both ears    -  1    Acute eczematoid otitis externa of left ear          Care Instructions    Scheduling Information  To schedule your CT/MRI scan, please contact [facility] at [phone] OR [facility] at [phone]    To schedule your Surgery, please contact our Specialty Schedulers at [phone]      ENT Clinic Locations Clinic Hours Telephone Number     [facility]  [address]  [place] [address]   Monday:           1:00pm -- 5:00pm    Friday:              8:00am - 12:00pm   To schedule/reschedule an appointment with   Dr. Mejia,   please contact our   Specialty Scheduling Department at:     [phone]       [facility]  [address] [place] [address] Tuesday:          8:00am -- 2:00pm         Urgent Care Locations Clinic Hours Telephone Numbers     [facility]  [address]  [place] [address]     Monday-Friday:     11:00am - 9:00pm    Saturday-Sunday:  9:00am - 5:00pm   [phone]     [facility]  [address] [place] [address]     Monday-Friday:      5:00pm - 9:00pm     Saturday-Sunday:  9:00am - 5:00pm   [phone]                 Follow-ups after your visit        Your next 10 appointments already scheduled     Jun 29, 2018 11:30 AM CDT   (Arrive by 11:15 AM)   Return Visit with Britt Oswald NP   [facility] ([facility])    [address] [place] [address]  [address] [place] [id number] [phone]            Jul 10, 2018 10:45 AM CDT   Adult Med Follow UP with LIZ Nesbitt CNS   Psychiatry Clinic ([facility])     [facility]  [address] [facility] [address]  [address] [place] [address]   [phone]            Jul 11, 2018  1:30 PM CDT   RETURN GENERAL with Dora Tovar MD   Eye Clinic ([facility])    [facility]  [address]  9th [address] Clin 9a  [facility] [address]   [phone]            Jul 11, 2018  2:30 PM CDT   RETURN RETINA with Meri Frost MD   Eye Clinic ([facility])    [facility]  [address]  9th Fl Clin 9a  [facility] [address]   [phone]            Jul 25, 2018  9:30 AM CDT   (Arrive by 9:15 AM)   Return Visit with Crystal Montero MD   [facility] Infectious Diseases ([facility])    [address]  [address]  [facility] [place] [facility] [address] [phone]            Jul 26, 2018  8:00 PM CDT   PSG Split with SLEEP STUDY RM 3   [facility] Sleep [facility] ([facility])    [address] [place] [address]   [phone]            Aug 06, 2018  9:00 AM CDT   (Arrive by 8:45 AM)   RETURN ENDOCRINE with Rach Vasquez MD   [facility] Endocrinology ([facility] [place])    [address]  3rd Floor  [facility] [address] [phone]            Aug 13, 2018  1:00 PM CDT   Return Sleep Patient with Vero Quiñonez MD   [facility] Sleep [facility] ([facility])    [address] [place] [address]   [phone]            Aug 15, 2018 11:30 AM CDT   (Arrive by 11:15 AM)   RETURN ATRIAL FIBULATION VISIT with LIZ Sauceda CNP   [facility] Heart Care ([facility])    [address]  [address]  [facility] [address]   [phone]            Sep 14, 2018 11:00 AM CDT   (Arrive by 10:45 AM)   Return Visit with LIZ Gamboa CNP   [facility]  "Cancer Clinic ([facility])    [address]  [address]  [facility] [address] [phone]              Who to contact     If you have questions or need follow up information about today's clinic visit or your schedule please contact [facility] directly at [phone].  Normal or non-critical lab and imaging results will be communicated to you by MyChart, letter or phone within 4 business days after the clinic has received the results. If you do not hear from us within 7 days, please contact the clinic through [employer] or phone. If you have a critical or abnormal lab result, we will notify you by phone as soon as possible.  Submit refill requests through [facility] or call your pharmacy and they will forward the refill request to us. Please allow 3 business days for your refill to be completed.          Additional Information About Your Visit        [employer] Information     [facility] gives you secure access to your electronic health record. If you see a primary care provider, you can also send messages to your care team and make appointments. If you have questions, please call your primary care clinic.  If you do not have a primary care provider, please call [phone] and they will assist you.        Care EveryWhere ID     This is your Care EveryWhere ID. This could be used by other organizations to access your [facility] medical records  [id number]        Your Vitals Were     Pulse Respirations Height Last Period BMI (Body Mass Index)       76 12 1.727 m (5' 8\") 06/01/1988 (Approximate) 26.61 kg/m2        Blood Pressure from Last 3 Encounters:   06/29/18 152/76   06/27/18 128/74   06/18/18 158/72    Weight from Last 3 Encounters:   06/29/18 79.4 kg (175 lb)   06/27/18 80.5 kg (177 lb 8 oz)   06/18/18 78.9 kg (174 lb)              Today, you had the following     No orders found for display         Today's Medication Changes          These changes are accurate as of " 6/29/18 10:09 AM.  If you have any questions, ask your nurse or doctor.               Start taking these medicines.        Dose/Directions    ciprofloxacin-dexamethasone otic suspension   Commonly known as:  CIPRODEX   Used for:  Acute eczematoid otitis externa of left ear   Started by:  Randell Mejia MD        Dose:  4 drop   Place 4 drops Into the left ear 2 times daily for 10 days   Quantity:  4 mL   Refills:  1            Where to get your medicines      These medications were sent to [facility] [address]  [address], [facility] [address]     Phone:  [phone]     ciprofloxacin-dexamethasone otic suspension                Primary Care Provider Office Phone # Fax #    Jennifer Amparo Barraza [phone]       [address] [facility] [address]        Equal Access to Services     [facility]: Hadii aad ku hadasho Soomaali, waaxda luqadaha, qaybta kaalmada adeegyada, waxay idiin hayaan дмитрий kharadank lu . So [facility] [phone].    ATENCIÓN: Si habla español, tiene a jason disposición servicios gratuitos de asistencia lingüística. Jcame al [phone].    We comply with applicable federal civil rights laws and [place] laws. We do not discriminate on the basis of race, color, national origin, age, disability, sex, sexual orientation, or gender identity.            Thank you!     Thank you for choosing [facility]  for your care. Our goal is always to provide you with excellent care. Hearing back from our patients is one way we can continue to improve our services. Please take a few minutes to complete the written survey that you may receive in the mail after your visit with us. Thank you!             Your Updated Medication List - Protect others around you: Learn how to safely use, store and throw away your medicines at www.disposemymeds.org.          This list is accurate as of 6/29/18 10:09 AM.  Always use  your most recent med list.                   Brand Name Dispense Instructions for use Diagnosis    acetaminophen 500 MG Caps      Take 1,000 mg by mouth nightly as needed Take 500-1,000 mg by mouth every 6 hours if needed.        ARTIFICIAL TEARS OP      Apply 1 drop to eye as needed        BENEFIBER Powd      2 teaspoons daily        calcitRIOL 0.5 MCG capsule    ROCALTROL    90 capsule    Take 1 capsule (0.5 mcg) by mouth daily    Postablative hypoparathyroidism (H)       ciprofloxacin-dexamethasone otic suspension    CIPRODEX    4 mL    Place 4 drops Into the left ear 2 times daily for 10 days    Acute eczematoid otitis externa of left ear       clotrimazole 1 % cream    LOTRIMIN     Apply topically 2 times daily as needed Reported on 4/25/2017        ELIQUIS 2.5 MG tablet   Generic drug:  apixaban ANTICOAGULANT      Take 2.5 mg by mouth 2 times daily        estradiol 0.1 MG/GM cream    ESTRACE VAGINAL    42.5 g    Place 2 g vaginally twice a week    Atrophic vaginitis       ezetimibe 10 MG tablet    ZETIA    30 tablet    Take 10 mg by mouth every evening        folic acid 1 MG tablet    FOLVITE    100 tablet    Take 1 tablet (1 mg) by mouth daily    Liver replaced by transplant (H)       furosemide 20 MG tablet    LASIX    46 tablet    Take 0.5 tablets (10 mg) by mouth daily    CKD (chronic kidney disease) stage 3, GFR 30-59 ml/min, Liver replaced by transplant (H)       hydrALAZINE 25 MG tablet    APRESOLINE    270 tablet    Take 0.5 tablets (12.5 mg) by mouth 3 times daily as needed , if systolic blood pressure is more than 140 mmHg.    HTN (hypertension)       ketoconazole 2 % cream    NIZORAL    15 g    Apply topically 2 times daily To angles of mouth    Angular cheilitis       meclizine 25 MG tablet    ANTIVERT    30 tablet    Take 1 tablet (25 mg) by mouth as needed    Dizziness       metoprolol succinate 50 MG 24 hr tablet    TOPROL-XL    90 tablet    Take 1 tablet (50 mg) by mouth daily    Paroxysmal  atrial fibrillation (H)       neomycin-polymyxin-hydrocortisone 3.5-42297-7 otic suspension    CORTISPORIN    10 mL    Place 4 drops Into the left ear 4 times daily    Acute swimmer's ear of left side       nitroFURantoin (macrocrystal-monohydrate) 100 MG capsule    MACROBID    14 capsule    Take 1 capsule (100 mg) by mouth 2 times daily For one week at onset of UTI symptoms    Urinary tract infection without hematuria, site unspecified       predniSONE 5 MG tablet    DELTASONE    90 tablet    Take 1 tablet (5 mg) by mouth daily    Thyroid cancer (H), Liver replaced by transplant (H)       PRESERVISION AREDS 2 Caps      Take 1 capsule by mouth 2 times daily        sertraline 50 MG tablet    ZOLOFT    30 tablet    Take 1 tablet (50 mg) by mouth daily    Adjustment disorder with depressed mood       sirolimus 0.5 MG Tabs tablet      Take 0.5 mg by mouth every other day        SUMAtriptan 50 MG tablet    IMITREX    30 tablet    Take 1 tablet (50 mg) by mouth at onset of headache for migraine repeat after 2 hours if needed.    Migraine without aura and without status migrainosus, not intractable       * SYNTHROID 150 MCG tablet   Generic drug:  levothyroxine      Take 225 mcg by mouth on Mondays        * levothyroxine 150 MCG tablet    SYNTHROID    120 tablet    Take 150mcg by mouth daily 6 days a week & 225mcg on Mondays    Malignant neoplasm of thyroid gland (H)       tobramycin 0.3 % ophthalmic solution    TOBREX    1 Bottle    Apply 1 drop to eye every 4 hours for 7 days    Acute conjunctivitis of left eye, unspecified acute conjunctivitis type       triamcinolone 0.1 % cream    KENALOG     Apply topically 2 times daily as needed for irritation        ursodiol 250 MG tablet    ACTIGALL    180 tablet    Take 1 tablet (250 mg) by mouth 2 times daily    Liver replaced by transplant (H)       voriconazole 200 MG tablet    VFEND    60 tablet    Take 1 tablet (200 mg) by mouth 2 times daily    Coccidioidal pneumonitis (H)        * Notice:  This list has 2 medication(s) that are the same as other medications prescribed for you. Read the directions carefully, and ask your doctor or other care provider to review them with you.

## 2018-06-29 NOTE — PATIENT INSTRUCTIONS
Scheduling Information  To schedule your CT/MRI scan, please contact [facility] at [phone] OR [facility] at [phone]    To schedule your Surgery, please contact our Specialty Schedulers at [phone]      ENT Clinic Locations Clinic Hours Telephone Number     [facility]  [address] [place] [address]   Monday:           1:00pm -- 5:00pm    Friday:              8:00am - 12:00pm   To schedule/reschedule an appointment with   Dr. Mejia,   please contact our   Specialty Scheduling Department at:     [phone]       [facility]  [address] [place] [address] Tuesday:          8:00am -- 2:00pm         Urgent Care Locations Clinic Hours Telephone Numbers     [facility]  [address] [place] [address]     Monday-Friday:     11:00am - 9:00pm    Saturday-Sunday:  9:00am - 5:00pm   [phone]     [facility]  [address] [place] [address]     Monday-Friday:      5:00pm - 9:00pm     Saturday-Sunday:  9:00am - 5:00pm   [phone]

## 2018-06-29 NOTE — LETTER
6/29/2018         RE: Luz Thompson  [address]  [place] [address]        Dear Colleague,    Thank you for referring your patient, Luz Thompson, to the [facility]. Please see a copy of my visit note below.    History of Present Illness - Luz Thompson is a 68 year old female just recently seen on 6/1/2018, added on today due to some acute ear issues.    To review, she has had LEFT ear surgery with a tympanoplasty in 1996.  Otherwise no chronic ear disease.  She has also had thyroid surgery and ablation in March 2010.  The main reason she is here is progressive bilateral ear fullness and blockage of her hearing aids with cerumen.    At the March 2014 visit, she had been through quite an ordeal, having been hospitalized for E. Coli sepsis, the source was unknown. No new issues with that problem since then. And after asking her about it, it happened again this past June of 2015. Her past year had been fine, and other than fullness from her cerumen build up, no new ENT issues. No drainage from the ears, no bleeding, no vertigo. She still uses bilateral hearing aids.    About one week ago she noted some drainage from the LEFT ear.  She went to urgent care and was placed on some antibiotics.  Of note, she also got some pink eye.  However, she was not given drops because of a concern for drug interaction.  However, she was then placed on oral antibiotics.      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     Prophylactic antibiotic     High risk medication use     Statin intolerance     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     Coccidioidal pneumonitis (H)     SNHL (sensorineural hearing loss)     Abnormal liver function tests     Diagnostic skin and  sensitization tests     Perforation bowel (H)     Liver replaced by transplant (H)     Adjustment disorder with depressed mood     Type 2 diabetes, HbA1c goal < 7% (H)     Hyperlipidemia LDL goal <70     Hypertension goal BP (blood pressure) < 140/80       Current Medications -   Current Outpatient Prescriptions:      acetaminophen 500 MG CAPS, Take 1,000 mg by mouth nightly as needed Take 500-1,000 mg by mouth every 6 hours if needed. , Disp: , Rfl:      calcitRIOL (ROCALTROL) 0.5 MCG capsule, Take 1 capsule (0.5 mcg) by mouth daily, Disp: 90 capsule, Rfl: 0     clotrimazole (LOTRIMIN) 1 % cream, Apply topically 2 times daily as needed Reported on 4/25/2017, Disp: , Rfl:      ELIQUIS 2.5 MG tablet, Take 2.5 mg by mouth 2 times daily , Disp: , Rfl:      estradiol (ESTRACE VAGINAL) 0.1 MG/GM cream, Place 2 g vaginally twice a week, Disp: 42.5 g, Rfl: 11     ezetimibe (ZETIA) 10 MG tablet, Take 10 mg by mouth every evening , Disp: 30 tablet, Rfl: 0     folic acid (FOLVITE) 1 MG tablet, Take 1 tablet (1 mg) by mouth daily, Disp: 100 tablet, Rfl: 3     furosemide (LASIX) 20 MG tablet, Take 0.5 tablets (10 mg) by mouth daily, Disp: 46 tablet, Rfl: 3     hydrALAZINE (APRESOLINE) 25 MG tablet, Take 0.5 tablets (12.5 mg) by mouth 3 times daily as needed , if systolic blood pressure is more than 140 mmHg., Disp: 270 tablet, Rfl: 3     Hypromellose (ARTIFICIAL TEARS OP), Apply 1 drop to eye as needed, Disp: , Rfl:      ketoconazole (NIZORAL) 2 % cream, Apply topically 2 times daily To angles of mouth, Disp: 15 g, Rfl: 0     levothyroxine (SYNTHROID) 150 MCG tablet, Take 150mcg by mouth daily 6 days a week & 225mcg on Mondays, Disp: 120 tablet, Rfl: 0     levothyroxine (SYNTHROID) 150 MCG tablet, Take 225 mcg by mouth on Mondays, Disp: , Rfl:      meclizine (ANTIVERT) 25 MG tablet, Take 1 tablet (25 mg) by mouth as needed, Disp: 30 tablet, Rfl: 11     metoprolol (TOPROL-XL) 50 MG 24 hr tablet, Take 1 tablet (50 mg) by mouth  daily, Disp: 90 tablet, Rfl: 3     Multiple Vitamins-Minerals (PRESERVISION AREDS 2) CAPS, Take 1 capsule by mouth 2 times daily, Disp: , Rfl:      neomycin-polymyxin-hydrocortisone (CORTISPORIN) 3.5-59321-7 otic suspension, Place 4 drops Into the left ear 4 times daily (Patient not taking: Reported on 6/27/2018), Disp: 10 mL, Rfl: 0     nitroFURantoin, macrocrystal-monohydrate, (MACROBID) 100 MG capsule, Take 1 capsule (100 mg) by mouth 2 times daily For one week at onset of UTI symptoms, Disp: 14 capsule, Rfl: 6     predniSONE (DELTASONE) 5 MG tablet, Take 1 tablet (5 mg) by mouth daily, Disp: 90 tablet, Rfl: 3     RAPAMUNE (BRAND) 0.5 MG PO TABLET, Take 0.5 mg by mouth every other day, Disp: , Rfl:      sertraline (ZOLOFT) 50 MG tablet, Take 1 tablet (50 mg) by mouth daily, Disp: 30 tablet, Rfl: 0     SUMAtriptan (IMITREX) 50 MG tablet, Take 1 tablet (50 mg) by mouth at onset of headache for migraine repeat after 2 hours if needed., Disp: 30 tablet, Rfl: 3     tobramycin (TOBREX) 0.3 % ophthalmic solution, Apply 1 drop to eye every 4 hours for 7 days, Disp: 1 Bottle, Rfl: 0     triamcinolone (KENALOG) 0.1 % cream, Apply topically 2 times daily as needed for irritation, Disp: , Rfl:      ursodiol (ACTIGALL) 250 MG tablet, Take 1 tablet (250 mg) by mouth 2 times daily, Disp: 180 tablet, Rfl: 3     voriconazole (VFEND) 200 MG tablet, Take 1 tablet (200 mg) by mouth 2 times daily, Disp: 60 tablet, Rfl: 2     Wheat Dextrin (BENEFIBER) POWD, 2 teaspoons daily, Disp: , Rfl:     Allergies -   Allergies   Allergen Reactions     Fluconazole Hives and Itching     Ciprofloxacin Anxiety and Other (See Comments)     Irregular heart beat     Azithromycin Itching     Benadryl [Diphenhydramine Hcl]      Insomnia      Cellcept Diarrhea     Ciprofloxacin Other (See Comments)     Insomnia, mood lability     Codeine      Psych disturbance     Codeine      Diphenhydramine Other (See Comments)     Doxycycline      Lansoprazole Diarrhea  "    Levaquin [Levofloxacin] Other (See Comments)     Headache, hyperactivity     Lisinopril Cough     Methotrexate      Sores     Methotrexate      Morphine Sulfate Itching     Mycophenolate Diarrhea     No Clinical Screening - See Comments      Simvastatin Muscle Pain (Myalgia)     severe     Cephalexin Rash     Fever and skin burning     Penicillin G Itching and Rash     Tolectin [Nsaids] Rash     Tramadol Rash       Social History -   History     Social History     Marital Status:      Spouse Name: Robbin Thompson     Number of Children: 4     Years of Education: 20     Occupational History     [employer]  [employer]      Self     Social History Main Topics     Smoking status: Former Smoker -- 1.00 packs/day for 18 years     Types: Cigarettes     Quit date: 1985     Smokeless tobacco: Never Used     Alcohol Use: Yes      Comment: rare - \"I toast at weddings\"     Drug Use: No     Sexual Activity:     Partners: Male     Birth Control/ Protection: Post-menopausal     Other Topics Concern     Exercise Yes     cardio and strengthing     Social History Narrative    She is retired. She and her  are traveling around the [place] in an RV. They are planning to be in [place] for the winter.       Family History -   Family History   Problem Relation Age of Onset     Hypertension Mother      Endometrial Cancer Mother      Hyperlipidemia Mother      Prostate Cancer Father      Macular Degeneration Father      Cancer - colorectal Maternal Grandmother      in her 80's, has surgery and removal of part of kidney,  at age 98     HEART DISEASE Maternal Grandfather       at 98     Glaucoma Maternal Grandfather      Cerebrovascular Disease Paternal Grandmother      in her 80's     Hypertension Paternal Grandmother      HEART DISEASE Paternal Grandfather      MI     Alzheimer Disease Paternal Grandfather      Allergies Son      Neurologic Disorder Daughter      Migraines "     Breast Cancer Other      Anesthesia Reaction No family hx of      Crohn Disease No family hx of      Ulcerative Colitis No family hx of        Review of Systems - As per HPI and PMHx, otherwise 7 system review of the head and neck negative.    Physical Exam  LMP 06/01/1988 (Approximate)    General - The patient is well nourished and well developed, and appears to have good nutritional status.  Alert and oriented to person and place, answers questions and cooperates with examination appropriately.   Head and Face - Normocephalic and atraumatic, with no gross asymmetry noted of the contour of the facial features.  The facial nerve is intact, with strong symmetric movements.  Voice and Breathing - The patient was breathing comfortably without the use of accessory muscles. There was no wheezing, stridor, or stertor.  The patients voice was clear and strong, and had appropriate pitch and quality.  Eyes - Extraocular movements intact, and the pupils were reactive to light.  Sclera were not icteric or injected, conjunctiva were pink and moist.  Mouth - Examination of the oral cavity showed pink, healthy oral mucosa. No lesions or ulcerations noted.  The tongue was mobile and midline, and the dentition were in good condition.    Throat - The walls of the oropharynx were smooth, pink, moist, symmetric, and had no lesions or ulcerations.  The tonsillar pillars and soft palate were symmetric.  The uvula was midline on elevation.    Ears - The tympanic membrane on the RIGHT is normal.  The LEFT canal was full of purulence and the canal and meatus are already looking cellulitic.  I suctioned all the purulence, and there is a 10% perforation in the tympanic membrane on the LEFT as well.        A/P - Virginia JERMAIN Thompson is a 68 year old female  (H90.5) SNHL (sensorineural hearing loss)  (primary encounter diagnosis)  (H61.23) Bilateral impacted cerumen    Continue the clindamycin, but I will add ciprodex    See me in two weeks  for close u on the infection and LEFT perforation.    Again, thank you for allowing me to participate in the care of your patient.        Sincerely,        Randell Mejia MD

## 2018-07-10 ENCOUNTER — OFFICE VISIT (OUTPATIENT)
Dept: PSYCHIATRY | Facility: CLINIC | Age: 68
End: 2018-07-10
Attending: CLINICAL NURSE SPECIALIST
Payer: MEDICARE

## 2018-07-10 VITALS
WEIGHT: 174.6 LBS | SYSTOLIC BLOOD PRESSURE: 131 MMHG | DIASTOLIC BLOOD PRESSURE: 61 MMHG | BODY MASS INDEX: 26.55 KG/M2 | HEART RATE: 84 BPM

## 2018-07-10 DIAGNOSIS — F43.21 ADJUSTMENT DISORDER WITH DEPRESSED MOOD: ICD-10-CM

## 2018-07-10 PROCEDURE — G0463 HOSPITAL OUTPT CLINIC VISIT: HCPCS | Mod: ZF

## 2018-07-10 RX ORDER — TETRAHYDROZOLINE HCL 0.05 %
1 DROPS OPHTHALMIC (EYE) 3 TIMES DAILY
COMMUNITY
End: 2018-07-03

## 2018-07-10 ASSESSMENT — PAIN SCALES - GENERAL: PAINLEVEL: MILD PAIN (3)

## 2018-07-10 NOTE — NURSING NOTE
"Chief Complaint   Patient presents with     Recheck Medication     Adjustment disorder with depressed mood     Patient remarked she \"was short of breath getting out of a shower\" \"passed out and fell on bathroom floor Sunday 7/8/2018 and suffers pain in low back and right shoulder pain.  "

## 2018-07-10 NOTE — MR AVS SNAPSHOT
After Visit Summary   7/10/2018    Luz Thompson    MRN: [id number]           Patient Information     Date Of Birth          1950        Visit Information        Provider Department      7/10/2018 10:45 AM Luzma Mercer APRN [facility]        Today's Diagnoses     Adjustment disorder with depressed mood           Follow-ups after your visit        Follow-up notes from your care team     Return in about 1 year (around 7/10/2019).      Your next 10 appointments already scheduled     Jul 11, 2018  1:30 PM CDT   RETURN GENERAL with Dora Tovar MD   Eye Clinic ([facility])    [facility]  [address]  [facility] [address]   [phone]            Jul 11, 2018  2:30 PM CDT   RETURN RETINA with Meri Frost MD   Eye Clinic ([facility])    [place] [id number]  [address] Clin [address]a  [facility] [address]   [phone]            Jul 13, 2018  1:00 PM CDT   (Arrive by 12:45 PM)   Return Visit with Britt Oswald NP   [facility] ([facility])    [address]  [address]  [facility] [address] [phone]            Jul 16, 2018  1:45 PM CDT   Return Visit with Randell Mejia MD   [facility]    [address] [place] [address] [phone]            Jul 25, 2018  9:30 AM CDT   (Arrive by 9:15 AM)   Return Visit with Crystal Montero MD   [facility] ([facility])    [address]  [address]  [facility] [address] [phone]            Jul 26, 2018  8:00 PM CDT   PSG Split with SLEEP STUDY  3   [facility] Sleep [facility] ([facility])    [address] [place] [id number] [phone]            Aug 06, 2018  9:00 AM CDT   (Arrive by  8:45 AM)   RETURN ENDOCRINE with Rach Vasquez MD   [facility] ([facility] [place])    [address]  3rd Floor  [facility] [id number] [phone]            Aug 13, 2018  1:00 PM CDT   Return Sleep Patient with Vero Quiñonez MD   [facility] ([facility])    [address] [place] [id number] [phone]            Aug 15, 2018 11:30 AM CDT   (Arrive by 11:15 AM)   RETURN ATRIAL FIBULATION VISIT with LIZ Sauceda CNP   [facility] ([facility] [place])    [address]  [address]  [facility] [id number] [phone]            Sep 14, 2018 11:00 AM CDT   (Arrive by 10:45 AM)   Return Visit with LIZ Gamboa CNP [facility] ([facility])    [address]  [address]  [facility] [address] [phone]              Who to contact     Please call your clinic at [phone] to:    Ask questions about your health    Make or cancel appointments    Discuss your medicines    Learn about your test results    Speak to your doctor            Additional Information About Your Visit        MyChart Information     [employer] gives you secure access to your electronic health record. If you see a primary care provider, you can also send messages to your care team and make appointments. If you have questions, please call your primary care clinic.  If you do not have a primary care provider, please call [phone] and they will assist you.      [employer] is an electronic gateway that provides easy, online access to your medical records. With [employer], you can request a clinic appointment, read your test results, renew a prescription or communicate with your care team.     To access your existing account, please contact your [facility]  or call [phone] for assistance.        Care EveryWhere ID     This is your Care EveryWhere ID. This could be used by other organizations to access your [facility] medical records  [id number]        Your Vitals Were     Pulse Last Period BMI (Body Mass Index)             84 06/01/1988 (Approximate) 26.55 kg/m2          Blood Pressure from Last 3 Encounters:   07/10/18 131/61   06/29/18 152/76   06/27/18 128/74    Weight from Last 3 Encounters:   07/10/18 79.2 kg (174 lb 9.6 oz)   06/29/18 79.4 kg (175 lb)   06/27/18 80.5 kg (177 lb 8 oz)              Today, you had the following     No orders found for display         Where to get your medicines      These medications were sent to [id number] - [place], [place] - [address]  [address], [place] [address]    Hours:  test fax successful 4/5/04 kr Phone:  [phone]     sertraline 50 MG tablet          Primary Care Provider Office Phone # Fax #    Jenniferedison Barraza [phone]       [address]  [facility] [address]        Equal Access to Services     [facility]: Hadii aad ku hadasho Soomaali, waaxda luqadaha, qaybta kaalmada adeegyada, waxay idiin hayaugustus lu . So [facility] [phone].    ATENCIÓN: Si habla español, tiene a jason disposición servicios gratuitos de asistencia lingüística. ame al [phone].    We comply with applicable federal civil rights laws and [place] laws. We do not discriminate on the basis of race, color, national origin, age, disability, sex, sexual orientation, or gender identity.            Thank you!     Thank you for choosing [facility]  for your care. Our goal is always to provide you with excellent care. Hearing back from our patients is one way we can continue to improve our services. Please take a few minutes to complete the written survey that you may receive in the mail after your visit with us. Thank you!             Your Updated Medication  List - Protect others around you: Learn how to safely use, store and throw away your medicines at www.disposemymeds.org.          This list is accurate as of 7/10/18 11:54 AM.  Always use your most recent med list.                   Brand Name Dispense Instructions for use Diagnosis    acetaminophen 500 MG Caps      Take 1,000 mg by mouth nightly as needed Take 500-1,000 mg by mouth every 6 hours if needed.        ARTIFICIAL TEARS OP      Apply 1 drop to eye as needed        BENEFIBER Powd      2 teaspoons daily        calcitRIOL 0.5 MCG capsule    ROCALTROL    90 capsule    Take 1 capsule (0.5 mcg) by mouth daily    Postablative hypoparathyroidism (H)       ciprofloxacin-dexamethasone otic suspension    CIPRODEX    4 mL    Place 4 drops Into the left ear 2 times daily for 10 days    Acute eczematoid otitis externa of left ear       clotrimazole 1 % cream    LOTRIMIN     Apply topically 2 times daily as needed Reported on 4/25/2017        ELIQUIS 2.5 MG tablet   Generic drug:  apixaban ANTICOAGULANT      Take 2.5 mg by mouth 2 times daily        estradiol 0.1 MG/GM cream    ESTRACE VAGINAL    42.5 g    Place 2 g vaginally twice a week    Atrophic vaginitis       ezetimibe 10 MG tablet    ZETIA    30 tablet    Take 10 mg by mouth every evening        folic acid 1 MG tablet    FOLVITE    100 tablet    Take 1 tablet (1 mg) by mouth daily    Liver replaced by transplant (H)       furosemide 20 MG tablet    LASIX    46 tablet    Take 0.5 tablets (10 mg) by mouth daily    CKD (chronic kidney disease) stage 3, GFR 30-59 ml/min, Liver replaced by transplant (H)       hydrALAZINE 25 MG tablet    APRESOLINE    270 tablet    Take 0.5 tablets (12.5 mg) by mouth 3 times daily as needed , if systolic blood pressure is more than 140 mmHg.    HTN (hypertension)       ketoconazole 2 % cream    NIZORAL    15 g    Apply topically 2 times daily To angles of mouth    Angular cheilitis       meclizine 25 MG tablet    ANTIVERT    30  tablet    Take 1 tablet (25 mg) by mouth as needed    Dizziness       metoprolol succinate 50 MG 24 hr tablet    TOPROL-XL    90 tablet    Take 1 tablet (50 mg) by mouth daily    Paroxysmal atrial fibrillation (H)       neomycin-polymyxin-hydrocortisone 3.5-99108-1 otic suspension    CORTISPORIN    10 mL    Place 4 drops Into the left ear 4 times daily    Acute swimmer's ear of left side       nitroFURantoin (macrocrystal-monohydrate) 100 MG capsule    MACROBID    14 capsule    Take 1 capsule (100 mg) by mouth 2 times daily For one week at onset of UTI symptoms    Urinary tract infection without hematuria, site unspecified       predniSONE 5 MG tablet    DELTASONE    90 tablet    Take 1 tablet (5 mg) by mouth daily    Thyroid cancer (H), Liver replaced by transplant (H)       PRESERVISION AREDS 2 Caps      Take 1 capsule by mouth 2 times daily        sertraline 50 MG tablet    ZOLOFT    90 tablet    Take 1 tablet (50 mg) by mouth daily    Adjustment disorder with depressed mood       sirolimus 0.5 MG Tabs tablet      Take 0.5 mg by mouth every other day        SUMAtriptan 50 MG tablet    IMITREX    30 tablet    Take 1 tablet (50 mg) by mouth at onset of headache for migraine repeat after 2 hours if needed.    Migraine without aura and without status migrainosus, not intractable       * SYNTHROID 150 MCG tablet   Generic drug:  levothyroxine      Take 225 mcg by mouth on Mondays        * levothyroxine 150 MCG tablet    SYNTHROID    120 tablet    Take 150mcg by mouth daily 6 days a week & 225mcg on Mondays    Malignant neoplasm of thyroid gland (H)       tetrahydrozoline 0.05 % ophthalmic solution      1 drop 3 times daily        triamcinolone 0.1 % cream    KENALOG     Apply topically 2 times daily as needed for irritation        UNABLE TO FIND      MEDICATION NAME: prescription eye drops  For pink eye (second eye drop prescribed)        ursodiol 250 MG tablet    ACTIGALL    180 tablet    Take 1 tablet (250 mg) by  mouth 2 times daily    Liver replaced by transplant (H)       voriconazole 200 MG tablet    VFEND    60 tablet    Take 1 tablet (200 mg) by mouth 2 times daily    Coccidioidal pneumonitis (H)       * Notice:  This list has 2 medication(s) that are the same as other medications prescribed for you. Read the directions carefully, and ask your doctor or other care provider to review them with you.

## 2018-07-10 NOTE — PROGRESS NOTES
Outpatient Psychiatry Progress Note     Provider: LIZ Nesbitt CNS  Date: 7/10/2018  Service:  Medication follow up with counseling.   Patient Identification: Luz Thompson  : 1950   MRN: [id number]    Luz Thompson is a 68 year old year old female who presents for ongoing psychiatric care.  Luz Thompson was last seen in clinic on 18 for intake appointment.   At that time,   Assessment & Plan      Assessment:   Luz Thompson is a 68 year old female  who presents for consideration of medication for treatment of depression.  Symptoms are response to stress due to her 's sudden request for a divorce. In the ED had been started on Wellbutrin but with increase dose from 100mg to 200mg had side effects.  Only previous treatment for depression occurred secondary to hepatic failure and transplant in . Has never been on long term antidepressant.  Requested MTM visit due to complicated medication interactions but since insurance will not cover at this time, [place]'s history of treatment and current medication were reviewed by [facility]:  Santi Segal,   As we thought, her most offending agent, in terms of drug interactions, is the voriconazole, which is a strong 3A4 inhibitor and a moderate 2C19 inhibitor.  The best antidepressant option would sertraline, since it is pretty minimal in terms of drug interactions.     Lexapro would probably be next, since it is only a weak 2D6 inhibitor, but the voriconazole may decrease the Lexapro concentration via 3A4.     Bupropion and Cymbalta are both moderate 2D6 inhibitors, so could potentially increase the concentration of the metoprolol, but that may not be too significant and could be adjusted.     Citalopram, Viibryd, Effexor, Fetzima, Trintellix, and mirtazapine are all major 3A4 substrates, like escitalopram, but may also have other interactions to consider.     I hope that helps!  Let me know if you had questions  about any specific agents.     Michelle        Diagnosis  Adjustment Disorder with Depressed Mood.      Plan:    Medication: At the time of the appointment on 18 no recommendations were sent to patient by My Chart on 18 for consideration of starting Sertraline 50mg if seems to be getting depressed again. On 5/15/18 discussed risks and benefits by phone as noted in phone call with that date.  OTC Recommendations: none  Lab Orders:  none  Referrals: none  Release of Information: Daughter Gloria Caro [phone].  Future Treatment Considerations: see recommendations of [facility].  Return for Follow Up: If starting medication recommend followup in 4 to 6 weeks.      ____________________________________________________________________________________________________________________________________________    07/10/2018   After the appointment on 18 Virginia started sertraline on 18. On  through My Chart she noted possible side effect of insomnia.  Today [place] reports she is feeling better. Takes sertraline at night and waking up at 3am but before she was taking it could not fall asleep. Now she is falling asleep.   On  she was in the shower and started feeling light headed at about 8 am. She sat down on the toliet and the passed out. She is sure this was because she had not eaten since 3pm the day before.  She is now living in an apartment in [place]. She knows the couple who own the home and they live upstairs.  She likes living there and enjoys cooking again.   Still in the divorce process and there is lots of contention.  Her sister in law passed away and although they were very close she was asked by her sister in laws daughter not to attend the .   She is still angry about his behavior. Attending  Side effects of medication include: making waking the night.   Psychiatric Review of Systems:  Depression: In the last 2 weeks per PHQ 9 more than half days sleep  "disturbance. Has had a few crying spells but was able to stop.  Anxiety : some improvement, not a problem at this time  Sindy na   Psychosis  na.   ADHD na    Review of Medical Systems:  Sleep: waking up during the night several times.  Has a sleep study scheduled for the end of the month.  Energy: working out with  2 times a week.  Concentration: stable  Appetite: has been losing weight secondary to depression but wanting to keep it down so now eating more carefully. Her friend is monitoring to make sure that she is eating frequently.   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      Diverticulosis of sigmoid colon 12/21/2013     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     Received Rituxan during Summer of 2016     H/O esophageal varices      Hearing loss      Heart murmur 4/4/2012     History of DVT (deep vein thrombosis)      History of [place] fever      History of thyroid cancer 9/25/2012     Hyperlipidemia 4/10/2012    Says that she does not have it anymore, not on meds     Hyperlipidemia LDL goal <70      Hypertension goal BP (blood pressure) < 140/80 11/06/2013     Hypertriglyceridemia      Liver replaced by transplant (H) 10/17/2011    Dr. 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     Patient Active Problem List   Diagnosis     Bladder infection, chronic     Osteoporosis     Osteoarthritis of right knee     HDL deficiency     Advanced directives, counseling/discussion     Vitamin D deficiency     S/P tympanoplasty     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     Prophylactic antibiotic     High risk medication use     Statin intolerance     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     Coccidioidal pneumonitis (H)     SNHL (sensorineural hearing loss)     Abnormal liver function tests     Diagnostic skin and sensitization tests     Perforation bowel (H)     Liver replaced by transplant (H)     Adjustment disorder with depressed mood     Type 2 diabetes, HbA1c goal < 7% (H)     Hyperlipidemia LDL goal <70     Hypertension goal BP (blood pressure) < 140/80       Allergies:   Allergies   Allergen Reactions     Fluconazole Hives and Itching     Ciprofloxacin Anxiety and Other (See Comments)     Irregular heart beat     Azithromycin Itching     Benadryl [Diphenhydramine Hcl]      Insomnia      Cellcept Diarrhea     Ciprofloxacin Other (See Comments)     Insomnia, mood lability     Codeine      Psych disturbance     Codeine      Diphenhydramine Other (See Comments)     Doxycycline      Lansoprazole Diarrhea     Levaquin [Levofloxacin] Other (See Comments)     Headache, hyperactivity     Lisinopril Cough     Methotrexate      Sores     Methotrexate      Morphine Sulfate Itching     Mycophenolate Diarrhea     No Clinical Screening - See Comments      Simvastatin Muscle Pain (Myalgia)     severe     Cephalexin Rash     Fever and skin burning     Penicillin G Itching and  Rash     Tolectin [Nsaids] Rash     Tramadol Rash          Current Medications     Current Outpatient Prescriptions Ordered in [facility]   Medication Sig Dispense Refill     acetaminophen 500 MG CAPS Take 1,000 mg by mouth nightly as needed Take 500-1,000 mg by mouth every 6 hours if needed.        calcitRIOL (ROCALTROL) 0.5 MCG capsule Take 1 capsule (0.5 mcg) by mouth daily 90 capsule 0     clotrimazole (LOTRIMIN) 1 % cream Apply topically 2 times daily as needed Reported on 4/25/2017       ELIQUIS 2.5 MG tablet Take 2.5 mg by mouth 2 times daily        estradiol (ESTRACE VAGINAL) 0.1 MG/GM cream Place 2 g vaginally twice a week 42.5 g 11     ezetimibe (ZETIA) 10 MG tablet Take 10 mg by mouth every evening  30 tablet 0     folic acid (FOLVITE) 1 MG tablet Take 1 tablet (1 mg) by mouth daily 100 tablet 3     furosemide (LASIX) 20 MG tablet Take 0.5 tablets (10 mg) by mouth daily 46 tablet 3     hydrALAZINE (APRESOLINE) 25 MG tablet Take 0.5 tablets (12.5 mg) by mouth 3 times daily as needed , if systolic blood pressure is more than 140 mmHg. 270 tablet 3     Hypromellose (ARTIFICIAL TEARS OP) Apply 1 drop to eye as needed       ketoconazole (NIZORAL) 2 % cream Apply topically 2 times daily To angles of mouth 15 g 0     levothyroxine (SYNTHROID) 150 MCG tablet Take 150mcg by mouth daily 6 days a week & 225mcg on Mondays 120 tablet 0     levothyroxine (SYNTHROID) 150 MCG tablet Take 225 mcg by mouth on Mondays       meclizine (ANTIVERT) 25 MG tablet Take 1 tablet (25 mg) by mouth as needed 30 tablet 11     metoprolol (TOPROL-XL) 50 MG 24 hr tablet Take 1 tablet (50 mg) by mouth daily 90 tablet 3     Multiple Vitamins-Minerals (PRESERVISION AREDS 2) CAPS Take 1 capsule by mouth 2 times daily       neomycin-polymyxin-hydrocortisone (CORTISPORIN) 3.5-08724-9 otic suspension Place 4 drops Into the left ear 4 times daily (Patient not taking: Reported on 6/27/2018) 10 mL 0     nitroFURantoin, macrocrystal-monohydrate,  "(MACROBID) 100 MG capsule Take 1 capsule (100 mg) by mouth 2 times daily For one week at onset of UTI symptoms 14 capsule 6     predniSONE (DELTASONE) 5 MG tablet Take 1 tablet (5 mg) by mouth daily 90 tablet 3     RAPAMUNE (BRAND) 0.5 MG PO TABLET Take 0.5 mg by mouth every other day       sertraline (ZOLOFT) 50 MG tablet Take 1 tablet (50 mg) by mouth daily 30 tablet 0     SUMAtriptan (IMITREX) 50 MG tablet Take 1 tablet (50 mg) by mouth at onset of headache for migraine repeat after 2 hours if needed. 30 tablet 3     triamcinolone (KENALOG) 0.1 % cream Apply topically 2 times daily as needed for irritation       ursodiol (ACTIGALL) 250 MG tablet Take 1 tablet (250 mg) by mouth 2 times daily 180 tablet 3     voriconazole (VFEND) 200 MG tablet Take 1 tablet (200 mg) by mouth 2 times daily 60 tablet 2     Wheat Dextrin (BENEFIBER) POWD 2 teaspoons daily       No current Epic-ordered facility-administered medications on file.         Mental Status Exam     Appearance:  Casually dressed and Well groomed  Behavior/relationship to examiner/demeanor:  Cooperative  Orientation: Oriented to person, place, time and situation  Psychomotor: normal form  Speech Rate:  Normal  Speech Spontaneity:  Normal  Mood:  \"getting better\"  Affect:  Appropriate/mood-congruent  Thought Process (Associations):  Goal directed  Thought Content:  no overt psychosis, denies suicidal ideation, intent or thoughts and patient does not appear to be responding to internal stimuli  Abnormal Perception:  None  Attention/Concentration:  Normal  Insight:  Good  Judgment:  Good      Results     Vital signs: /61  Pulse 84  Wt 79.2 kg (174 lb 9.6 oz)  LMP 06/01/1988 (Approximate)  BMI 26.55 kg/m2    Laboratory Data:  reviewed from other providers. No concerns.    Assessment & Plan      Luz Thompson is seen today for follow up and reports she is feeling better than when taking Wellbutrin and except for waking during the night has not had " side effects. Amount of time waking varies. Patient has sleep study scheduled as has been having different sleep problems for several years.     Diagnosis  Adjustment Disorder with Depressed Mood.    Plan:  Medication: Continue Sertraline 50mg  OTC Recommendations: none, discussed Mid Nite an OTC for sleep but due to possible interactions with current medications she will not try it  Lab Orders:  none  Referrals: none  Release of Information: none  Future Treatment Considerations:per symptoms, side effects  Return for Follow Up:1 year   The risks, benefits, alternatives and side effects have been discussed and are understood by the patient. The patient understands the risks of using street drugs or alcohol. There are no medical contraindications, the patient agrees to treatment, and has the capacity to do so. The patient understands to call 911 or come to the nearest ED if life threatening or urgent symptoms present.  In addition time was spent counseling the patient and/or coordinating care regarding review of social and occupational functioning.  In addition patient was counseled on health and wellness practices to augment medication treatment of symptoms. See note for details.    Luzma Mercer, APRN CNS 7/10/2018

## 2018-07-11 ENCOUNTER — OFFICE VISIT (OUTPATIENT)
Dept: OPHTHALMOLOGY | Facility: CLINIC | Age: 68
End: 2018-07-11
Attending: OPHTHALMOLOGY
Payer: MEDICARE

## 2018-07-11 DIAGNOSIS — H40.003 GLAUCOMA SUSPECT OF BOTH EYES: ICD-10-CM

## 2018-07-11 DIAGNOSIS — H35.3130 BILATERAL NONEXUDATIVE AGE-RELATED MACULAR DEGENERATION: ICD-10-CM

## 2018-07-11 DIAGNOSIS — H35.3190 NONEXUDATIVE SENILE MACULAR DEGENERATION OF RETINA: Primary | ICD-10-CM

## 2018-07-11 PROCEDURE — 92133 CPTRZD OPH DX IMG PST SGM ON: CPT | Mod: ZF | Performed by: OPHTHALMOLOGY

## 2018-07-11 PROCEDURE — G0463 HOSPITAL OUTPT CLINIC VISIT: HCPCS | Mod: ZF

## 2018-07-11 PROCEDURE — 40000269 ZZH STATISTIC NO CHARGE FACILITY FEE: Mod: ZF

## 2018-07-11 PROCEDURE — 92083 EXTENDED VISUAL FIELD XM: CPT | Mod: ZF | Performed by: OPHTHALMOLOGY

## 2018-07-11 ASSESSMENT — VISUAL ACUITY
METHOD: SNELLEN - LINEAR
OD_SC+: -2
OD_SC: 20/30
OD_PH_SC: 20/25-2
OS_SC+: -2
OD_SC+: -2
OD_SC: 20/30
OS_SC: 20/20
OS_SC+: -2
METHOD: SNELLEN - LINEAR
OD_PH_SC: 20/25-2
OS_SC: 20/20

## 2018-07-11 ASSESSMENT — CUP TO DISC RATIO
OS_RATIO: 0.5
OD_RATIO: 0.5
OS_RATIO: 0.5
OD_RATIO: 0.5

## 2018-07-11 ASSESSMENT — CONF VISUAL FIELD
OS_NORMAL: 1
OD_NORMAL: 1
OS_NORMAL: 1
OD_NORMAL: 1
METHOD: COUNTING FINGERS

## 2018-07-11 ASSESSMENT — TONOMETRY
OS_IOP_MMHG: 11
OD_IOP_MMHG: 10
IOP_METHOD: APPLANATION
OD_IOP_MMHG: 10
IOP_METHOD: APPLANATION
OS_IOP_MMHG: 11

## 2018-07-11 ASSESSMENT — EXTERNAL EXAM - LEFT EYE
OS_EXAM: NORMAL
OS_EXAM: NORMAL

## 2018-07-11 ASSESSMENT — EXTERNAL EXAM - RIGHT EYE
OD_EXAM: NORMAL
OD_EXAM: NORMAL

## 2018-07-11 NOTE — PROGRESS NOTES
CC: follow up nonexudative AMD    HPI: Luz Thompson is a  66 year old year-old patient with history of nonexudative AMD both eyes presenting for follow up.     Interval History:  Vision has been stable in both eyes.  Saw Dr. Tovar earlier today.  Declined manifest refraction today.  No new visual complaints.    RETINAL IMAGING  Optical Coherence Tomography 7.12.17  Right eye - RPE changes nasal to fovea, drusen, trace Epiretinal membrane   Left eye- RPE changes nasal to fovea, drusen, trace Epiretinal membrane     Autofluorescence 7.12.17  Right eye-hyperautofluorescent spots indicating drusen and RPE changes   Left eye--hyperautofluorescent spots indicating drusen and RPE changes      Assessment & Plan:  1. Nonexudative senile macular degeneration of retina  - Bilateral drusen  - On AREDS2 (usure of formulation) - recommended AREDS2 given smoking history (stopped 1985)  - Continue Amsler, using regularily    2. ERM OD  - Mild, not likely visually significant   - Observe    3. Pseudophakia of both eyes  - PCO both eyes right >left  - s/p Yag capsulotomy with Dr Jorgensen last year    4. Posterior vitreous detachment, bilateral  - Retinal detachment precautions given (need to return for immediate exam for increasing flashes or light, floaters, worsening vision, or the appearance of a shadow or curtain in the vision)    5. Glaucoma suspect  (+) grandfather with glaucoma   Followed by Dr. Tovar     Return in 12 months or sooner as needed  Optical Coherence Tomography and Autofluorescence     Will Matute M.D.  PGY-3, Ophthalmology         ~~~~~~~~~~~~~~~~~~~~~~~~~~~~~~~~~~   Complete documentation of historical and exam elements from today's encounter can be found in the full encounter summary report (not reduplicated in this progress note).  I personally obtained the chief complaint(s) and history of present illness.  I confirmed and edited as necessary the review of systems, past medical/surgical history,  family history, social history, and examination findings as documented by others; and I examined the patient myself.  I personally reviewed the relevant tests, images, and reports as documented above.  I formulated and edited as necessary the assessment and plan and discussed the findings and management plan with the patient and family    Meri Frost MD  .  Retina Service   Department of Ophthalmology and Visual Neurosciences   [facility]  Phone: [phone]   Fax: [phone]

## 2018-07-11 NOTE — NURSING NOTE
Chief Complaints and History of Present Illnesses   Patient presents with     Glaucoma Suspect Follow Up     1 year follow up both eyes.     HPI    Affected eye(s):  Both   Symptoms:     Floaters (Comment: Occasional floater in BE, no changes.)   No flashes   No redness   No tearing   No Dryness   No itching         Do you have eye pain now?:  No      Comments:  Pt states vision has been good both distance and near. Both eyes feel good today.     Max SIMPSON July 11, 2018 1:29 PM

## 2018-07-11 NOTE — MR AVS SNAPSHOT
After Visit Summary   7/11/2018    Luz Thompson    MRN: [id number]           Patient Information     Date Of Birth          1950        Visit Information        Provider Department      7/11/2018 2:30 PM Meri Frost MD [facility]        Today's Diagnoses     Nonexudative senile macular degeneration of retina    -  1       Follow-ups after your visit        Follow-up notes from your care team     Return in about 1 year (around 7/11/2019) for Optical Coherence Tomography and Autofluorescence .      Your next 10 appointments already scheduled     Jul 13, 2018  1:00 PM CDT   (Arrive by 12:45 PM)   Return Visit with Britt Oswald NP   [facility] ([facility])    [address]  [address]  [facility] [id number] [phone]            Jul 16, 2018  1:45 PM CDT   Return Visit with Randell Mejia MD   [facility] ([facility])    [address] [place] [address] [phone]            Jul 25, 2018  9:30 AM CDT   (Arrive by 9:15 AM)   Return Visit with Crystal Montero MD   [facility] ([facility])    [address]  [address]  [facility] [address] [phone]            Jul 26, 2018  8:00 PM CDT   PSG Split with SLEEP STUDY  3   [facility], Sleep Study ([facility])    [address] [place] [id number] [phone]            Aug 06, 2018  9:00 AM CDT   (Arrive by 8:45 AM)   RETURN ENDOCRINE with Rach Vasquez MD   [facility] Endocrinology ([facility])    [address]  3rd Floor  [facility] [address] [phone]            Aug 13, 2018  1:00 PM CDT   Return Sleep Patient with Vero Quiñonez MD   [facility] Sleep Study ([facility]  [address] [address]   [phone]            Aug 15, 2018 11:30 AM CDT   (Arrive by 11:15 AM)   RETURN ATRIAL FIBULATION VISIT with LIZ Sauceda CNP   [facility] ([facility])    [address] [place] [address]  [address]  [facility] [address] [phone]            Sep 14, 2018 10:00 AM CDT   CT CHEST W/O CONTRAST with UCCT2   [facility] ([facility])    [address]  1st Floor  [facility] [address] [phone]           Please bring any scans or X-rays taken at other hospitals, if similar tests were done. Also bring a list of your medicines, including vitamins, minerals and over-the-counter drugs. It is safest to leave personal items at home.  Be sure to tell your doctor:   If you have any allergies.   If there s any chance you are pregnant.   If you are breastfeeding.  You do not need to do anything special to prepare for this exam.  Please wear loose clothing, such as a sweat suit or jogging clothes. Avoid snaps, zippers and other metal. We may ask you to undress and put on a hospital gown.            Sep 14, 2018 11:00 AM CDT   (Arrive by 10:45 AM)   Return Visit with LIZ Gamboa CNP [facility] ([facility] [place])    [address]  [address]  [facility] [address] [phone]              Future tests that were ordered for you today     Open Future Orders        Priority Expected Expires Ordered    Fundus Autofluorescence Image (FAF) OU (both eyes) Routine  1/12/2020 7/11/2018    OCT Retina Spectralis OU (both eyes) Routine  1/12/2020 7/11/2018            Who to contact     Please call your clinic at [phone] to:    Ask questions about your health    Make or cancel appointments    Discuss your medicines    Learn about your test results    Speak to your doctor            Additional Information About Your  Visit        VGBiohart Information     FlowPay gives you secure access to your electronic health record. If you see a primary care provider, you can also send messages to your care team and make appointments. If you have questions, please call your primary care clinic.  If you do not have a primary care provider, please call [phone] and they will assist you.      FlowPay is an electronic gateway that provides easy, online access to your medical records. With FlowPay, you can request a clinic appointment, read your test results, renew a prescription or communicate with your care team.     To access your existing account, please contact your [facility] or call [phone] for assistance.        Care EveryWhere ID     This is your Care EveryWhere ID. This could be used by other organizations to access your [place] medical records  [id number]        Your Vitals Were     Last Period                   06/01/1988 (Approximate)            Blood Pressure from Last 3 Encounters:   06/29/18 152/76   06/27/18 128/74   06/18/18 158/72    Weight from Last 3 Encounters:   06/29/18 79.4 kg (175 lb)   06/27/18 80.5 kg (177 lb 8 oz)   06/18/18 78.9 kg (174 lb)               Primary Care Provider Office Phone # Fax #    Jennifer Amparo Barraza [phone]       [address]  [facility] [address]        Equal Access to Services     [facility]: Hadii aad ku hadasho Soomaali, waaxda luqadaha, qaybta kaalmada adeegyada, etta lu . So [facility] [phone].    ATENCIÓN: Si habla español, tiene a jason disposición servicios gratuitos de asistencia lingüística. Llame al [phone].    We comply with applicable federal civil rights laws and [place] laws. We do not discriminate on the basis of race, color, national origin, age, disability, sex, sexual orientation, or gender identity.            Thank you!     Thank you for choosing [facility]YE CLINIC  for your  care. Our goal is always to provide you with excellent care. Hearing back from our patients is one way we can continue to improve our services. Please take a few minutes to complete the written survey that you may receive in the mail after your visit with us. Thank you!             Your Updated Medication List - Protect others around you: Learn how to safely use, store and throw away your medicines at www.disposemymeds.org.          This list is accurate as of 7/11/18  4:13 PM.  Always use your most recent med list.                   Brand Name Dispense Instructions for use Diagnosis    acetaminophen 500 MG Caps      Take 1,000 mg by mouth nightly as needed Take 500-1,000 mg by mouth every 6 hours if needed.        ARTIFICIAL TEARS OP      Apply 1 drop to eye as needed        BENEFIBER Powd      2 teaspoons daily        calcitRIOL 0.5 MCG capsule    ROCALTROL    90 capsule    Take 1 capsule (0.5 mcg) by mouth daily    Postablative hypoparathyroidism (H)       clotrimazole 1 % cream    LOTRIMIN     Apply topically 2 times daily as needed Reported on 4/25/2017        ELIQUIS 2.5 MG tablet   Generic drug:  apixaban ANTICOAGULANT      Take 2.5 mg by mouth 2 times daily        estradiol 0.1 MG/GM cream    ESTRACE VAGINAL    42.5 g    Place 2 g vaginally twice a week    Atrophic vaginitis       ezetimibe 10 MG tablet    ZETIA    30 tablet    Take 10 mg by mouth every evening        folic acid 1 MG tablet    FOLVITE    100 tablet    Take 1 tablet (1 mg) by mouth daily    Liver replaced by transplant (H)       furosemide 20 MG tablet    LASIX    46 tablet    Take 0.5 tablets (10 mg) by mouth daily    CKD (chronic kidney disease) stage 3, GFR 30-59 ml/min, Liver replaced by transplant (H)       hydrALAZINE 25 MG tablet    APRESOLINE    270 tablet    Take 0.5 tablets (12.5 mg) by mouth 3 times daily as needed , if systolic blood pressure is more than 140 mmHg.    HTN (hypertension)       ketoconazole 2 % cream    NIZORAL     15 g    Apply topically 2 times daily To angles of mouth    Angular cheilitis       meclizine 25 MG tablet    ANTIVERT    30 tablet    Take 1 tablet (25 mg) by mouth as needed    Dizziness       metoprolol succinate 50 MG 24 hr tablet    TOPROL-XL    90 tablet    Take 1 tablet (50 mg) by mouth daily    Paroxysmal atrial fibrillation (H)       neomycin-polymyxin-hydrocortisone 3.5-65917-1 otic suspension    CORTISPORIN    10 mL    Place 4 drops Into the left ear 4 times daily    Acute swimmer's ear of left side       nitroFURantoin (macrocrystal-monohydrate) 100 MG capsule    MACROBID    14 capsule    Take 1 capsule (100 mg) by mouth 2 times daily For one week at onset of UTI symptoms    Urinary tract infection without hematuria, site unspecified       predniSONE 5 MG tablet    DELTASONE    90 tablet    Take 1 tablet (5 mg) by mouth daily    Thyroid cancer (H), Liver replaced by transplant (H)       PRESERVISION AREDS 2 Caps      Take 1 capsule by mouth 2 times daily        sertraline 50 MG tablet    ZOLOFT    90 tablet    Take 1 tablet (50 mg) by mouth daily    Adjustment disorder with depressed mood       sirolimus 0.5 MG Tabs tablet      Take 0.5 mg by mouth every other day        SUMAtriptan 50 MG tablet    IMITREX    30 tablet    Take 1 tablet (50 mg) by mouth at onset of headache for migraine repeat after 2 hours if needed.    Migraine without aura and without status migrainosus, not intractable       * SYNTHROID 150 MCG tablet   Generic drug:  levothyroxine      Take 225 mcg by mouth on Mondays        * levothyroxine 150 MCG tablet    SYNTHROID    120 tablet    Take 150mcg by mouth daily 6 days a week & 225mcg on Mondays    Malignant neoplasm of thyroid gland (H)       tetrahydrozoline 0.05 % ophthalmic solution      1 drop 3 times daily        triamcinolone 0.1 % cream    KENALOG     Apply topically 2 times daily as needed for irritation        UNABLE TO FIND      MEDICATION NAME: prescription eye drops  For  pink eye (second eye drop prescribed)        ursodiol 250 MG tablet    ACTIGALL    180 tablet    Take 1 tablet (250 mg) by mouth 2 times daily    Liver replaced by transplant (H)       voriconazole 200 MG tablet    VFEND    60 tablet    Take 1 tablet (200 mg) by mouth 2 times daily    Coccidioidal pneumonitis (H)       * Notice:  This list has 2 medication(s) that are the same as other medications prescribed for you. Read the directions carefully, and ask your doctor or other care provider to review them with you.

## 2018-07-11 NOTE — PROGRESS NOTES
HPI  Luz Thompson is a 68 year old female here for follow-up of glaucoma suspect both eyes. The vision is overall good and she denies flashes/floaters. She is also seeing Dr. Frost.     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  (primary encounter diagnosis)  Comment: Based on asymmetric disc appearance with thin temporal rim left eye    FH: + grandfather  Pachymetry: 577/579 (2016)  Gonioscopy:  Tmax: High teens OU  Today's IOP: 10/11  Target IOP:  Current medications: None  Meds to avoid: None  Visual field: OVF 24-2 (7/11/2018)  OD: Few depressed points on pattern deviation, MD -2.8, PSD 2.8  OS: Few depressed points on pattern deviation, MD -2.9, PSD 3.6  Nerve OCT: Spectralis optic nerve OCT (7/11/2018)  OD: Avg RNFL thickness 89, all green  OS: Avg RNFL thickness 87, superior red, rest green   - stable    Normal IOPs today.    Plan: Observe for now. Repeat OVF 24-2 and nerve OCT at next visit.    (H35.31) Age-related macular degeneration, dry, both eyes  Comment: Followed by Dr. Frost, seen today. Bilateral drusen. No heme or SRF.  Plan: Continue AREDS2 and Amsler grid monitoring    (H35.371) Epiretinal membrane, right eye  Comment: Mild  Plan: Observe    (Z96.1) Pseudophakia, both eyes  Comment: Clear visual axis  Plan: Observe    (H04.123) Dry eyes, bilateral  Comment: Mild  Plan: Continue ATs 2-3 x daily     -----------------------------------------------------------------------------------    Patient disposition:   Return in about 1 year (around 7/11/2019) for applanation IOP, OVF 24-2, dilation, nerve OCT OU. or sooner as needed.    Teaching statement:  Complete documentation of historical and exam elements from today's encounter can be found in the full encounter summary report (not reduplicated in this progress note). I personally obtained the chief complaint(s) and history of present illness.  I confirmed and edited as necessary the review of systems, past  medical/surgical history, family history, social history, and examination findings as documented by others; and I examined the patient myself. I personally reviewed the relevant tests, images, and reports as documented above.     I formulated and edited as necessary the assessment and plan and discussed the findings and management plan with the patient and family.      Dora Tovar MD  Comprehensive Ophthalmology & Ocular Pathology  Department of Ophthalmology and Visual Neurosciences  [email or web address]  Pager [phone]

## 2018-07-11 NOTE — MR AVS SNAPSHOT
After Visit Summary   7/11/2018    Luz Thompson    MRN: [id number]           Patient Information     Date Of Birth          1950        Visit Information        Provider Department      7/11/2018 1:30 PM Dora Tovar MD [facility]        Today's Diagnoses     Glaucoma suspect of both eyes        Bilateral nonexudative age-related macular degeneration           Follow-ups after your visit        Follow-up notes from your care team     Return in about 1 year (around 7/11/2019) for applanation IOP, OVF 24-2, dilation, nerve OCT OU.      Your next 10 appointments already scheduled     Jul 13, 2018  1:00 PM CDT   (Arrive by 12:45 PM)   Return Visit with Britt Oswald NP   [facility] ([facility])    [address]  [address]  [facility] [address] [phone]            Jul 16, 2018  1:45 PM CDT   Return Visit with Randell Mejia MD   [facility]    [address] [place] [address] [phone]            Jul 25, 2018  9:30 AM CDT   (Arrive by 9:15 AM)   Return Visit with Crystal Montero MD   [facility] and Infectious Diseases ([facility])    [address]  [address]  [facility] [address] [phone]            Jul 26, 2018  8:00 PM CDT   PSG Split with SLEEP STUDY  3   [facility], Sleep Study ([facility])    [address] [place] [id number] [phone]            Aug 06, 2018  9:00 AM CDT   (Arrive by 8:45 AM)   RETURN ENDOCRINE with Rach Vasquez MD   [facility] Endocrinology ([facility])    [address]  3rd Floor  [facility] [address] [phone]            Aug 13, 2018  1:00 PM CDT   Return Sleep Patient with Vero Quiñonez MD   [facility], Sleep Study ([facility])    [address] [place] [address]   [phone]            Aug 15, 2018 11:30 AM CDT   (Arrive by 11:15 AM)   RETURN ATRIAL FIBULATION VISIT with LIZ Sauceda CNP   [facility] ([facility])    [address]  [address]  [facility] [address] [phone]            Sep 14, 2018 10:00 AM CDT   CT CHEST W/O CONTRAST with UCCT2   [facility] [place] [facility] ([facility])    [address]  1st Floor  [facility] [address] [phone]           Please bring any scans or X-rays taken at other hospitals, if similar tests were done. Also bring a list of your medicines, including vitamins, minerals and over-the-counter drugs. It is safest to leave personal items at home.  Be sure to tell your doctor:   If you have any allergies.   If there s any chance you are pregnant.   If you are breastfeeding.  You do not need to do anything special to prepare for this exam.  Please wear loose clothing, such as a sweat suit or jogging clothes. Avoid snaps, zippers and other metal. We may ask you to undress and put on a hospital gown.            Sep 14, 2018 11:00 AM CDT   (Arrive by 10:45 AM)   Return Visit with LIZ Gamboa CNP [facility] ([facility])    [address]  [address]  [facility] [address] [phone]              Future tests that were ordered for you today     Open Future Orders        Priority Expected Expires Ordered    Fundus Autofluorescence Image (FAF) OU (both eyes) Routine  1/12/2020 7/11/2018    OCT Retina Spectralis OU (both eyes) Routine  1/12/2020 7/11/2018            Who to contact     Please call your clinic at [phone] to:    Ask questions about your health    Make or cancel appointments    Discuss your medicines    Learn about your test results    Speak to your doctor             Additional Information About Your Visit        Andelhart Information     Corps[employer]v gives you secure access to your electronic health record. If you see a primary care provider, you can also send messages to your care team and make appointments. If you have questions, please call your primary care clinic.  If you do not have a primary care provider, please call [phone] and they will assist you.      [employer]v is an electronic gateway that provides easy, online access to your medical records. With [employer], you can request a clinic appointment, read your test results, renew a prescription or communicate with your care team.     To access your existing account, please contact your [facility] or call [phone] for assistance.        Care EveryWhere ID     This is your Care EveryWhere ID. This could be used by other organizations to access your [place] medical records  [id number]        Your Vitals Were     Last Period                   06/01/1988 (Approximate)            Blood Pressure from Last 3 Encounters:   06/29/18 152/76   06/27/18 128/74   06/18/18 158/72    Weight from Last 3 Encounters:   06/29/18 79.4 kg (175 lb)   06/27/18 80.5 kg (177 lb 8 oz)   06/18/18 78.9 kg (174 lb)              We Performed the Following     OCT Optic Nerve RNFL Spectralis OU (both eyes)     OCT Retina Spectralis OU (both eyes)     OVF 24-2 Dynamic OU        Primary Care Provider Office Phone # Fax #    Jennifer Amparo Barraza [phone]       [address]  [facility] [address]        Equal Access to Services     [facility]: Hadii aad ku hadasho Soomaali, waaxda luqadaha, qaybta kaalmada adeegyada, waxay idiin hayaugustus brenner'augustus . So [facility] [phone].    ATENCIÓN: Si habla español, tiene a jason disposición servicios gratuitos de asistencia lingüística. Llame al [phone].    We comply with applicable federal civil rights laws and [place] laws. We do not  discriminate on the basis of race, color, national origin, age, disability, sex, sexual orientation, or gender identity.            Thank you!     Thank you for choosing [facility]  for your care. Our goal is always to provide you with excellent care. Hearing back from our patients is one way we can continue to improve our services. Please take a few minutes to complete the written survey that you may receive in the mail after your visit with us. Thank you!             Your Updated Medication List - Protect others around you: Learn how to safely use, store and throw away your medicines at www.disposemymeds.org.          This list is accurate as of 7/11/18  5:39 PM.  Always use your most recent med list.                   Brand Name Dispense Instructions for use Diagnosis    acetaminophen 500 MG Caps      Take 1,000 mg by mouth nightly as needed Take 500-1,000 mg by mouth every 6 hours if needed.        ARTIFICIAL TEARS OP      Apply 1 drop to eye as needed        BENEFIBER Powd      2 teaspoons daily        calcitRIOL 0.5 MCG capsule    ROCALTROL    90 capsule    Take 1 capsule (0.5 mcg) by mouth daily    Postablative hypoparathyroidism (H)       clotrimazole 1 % cream    LOTRIMIN     Apply topically 2 times daily as needed Reported on 4/25/2017        ELIQUIS 2.5 MG tablet   Generic drug:  apixaban ANTICOAGULANT      Take 2.5 mg by mouth 2 times daily        estradiol 0.1 MG/GM cream    ESTRACE VAGINAL    42.5 g    Place 2 g vaginally twice a week    Atrophic vaginitis       ezetimibe 10 MG tablet    ZETIA    30 tablet    Take 10 mg by mouth every evening        folic acid 1 MG tablet    FOLVITE    100 tablet    Take 1 tablet (1 mg) by mouth daily    Liver replaced by transplant (H)       furosemide 20 MG tablet    LASIX    46 tablet    Take 0.5 tablets (10 mg) by mouth daily    CKD (chronic kidney disease) stage 3, GFR 30-59 ml/min, Liver replaced by transplant (H)       hydrALAZINE 25 MG tablet    APRESOLINE     270 tablet    Take 0.5 tablets (12.5 mg) by mouth 3 times daily as needed , if systolic blood pressure is more than 140 mmHg.    HTN (hypertension)       ketoconazole 2 % cream    NIZORAL    15 g    Apply topically 2 times daily To angles of mouth    Angular cheilitis       meclizine 25 MG tablet    ANTIVERT    30 tablet    Take 1 tablet (25 mg) by mouth as needed    Dizziness       metoprolol succinate 50 MG 24 hr tablet    TOPROL-XL    90 tablet    Take 1 tablet (50 mg) by mouth daily    Paroxysmal atrial fibrillation (H)       neomycin-polymyxin-hydrocortisone 3.5-58668-7 otic suspension    CORTISPORIN    10 mL    Place 4 drops Into the left ear 4 times daily    Acute swimmer's ear of left side       nitroFURantoin (macrocrystal-monohydrate) 100 MG capsule    MACROBID    14 capsule    Take 1 capsule (100 mg) by mouth 2 times daily For one week at onset of UTI symptoms    Urinary tract infection without hematuria, site unspecified       predniSONE 5 MG tablet    DELTASONE    90 tablet    Take 1 tablet (5 mg) by mouth daily    Thyroid cancer (H), Liver replaced by transplant (H)       PRESERVISION AREDS 2 Caps      Take 1 capsule by mouth 2 times daily        sertraline 50 MG tablet    ZOLOFT    90 tablet    Take 1 tablet (50 mg) by mouth daily    Adjustment disorder with depressed mood       sirolimus 0.5 MG Tabs tablet      Take 0.5 mg by mouth every other day        SUMAtriptan 50 MG tablet    IMITREX    30 tablet    Take 1 tablet (50 mg) by mouth at onset of headache for migraine repeat after 2 hours if needed.    Migraine without aura and without status migrainosus, not intractable       * SYNTHROID 150 MCG tablet   Generic drug:  levothyroxine      Take 225 mcg by mouth on Mondays        * levothyroxine 150 MCG tablet    SYNTHROID    120 tablet    Take 150mcg by mouth daily 6 days a week & 225mcg on Mondays    Malignant neoplasm of thyroid gland (H)       tetrahydrozoline 0.05 % ophthalmic solution      1  drop 3 times daily        triamcinolone 0.1 % cream    KENALOG     Apply topically 2 times daily as needed for irritation        UNABLE TO FIND      MEDICATION NAME: prescription eye drops  For pink eye (second eye drop prescribed)        ursodiol 250 MG tablet    ACTIGALL    180 tablet    Take 1 tablet (250 mg) by mouth 2 times daily    Liver replaced by transplant (H)       voriconazole 200 MG tablet    VFEND    60 tablet    Take 1 tablet (200 mg) by mouth 2 times daily    Coccidioidal pneumonitis (H)       * Notice:  This list has 2 medication(s) that are the same as other medications prescribed for you. Read the directions carefully, and ask your doctor or other care provider to review them with you.

## 2018-07-11 NOTE — NURSING NOTE
Chief Complaints and History of Present Illnesses   Patient presents with     Macular Degeneration Follow Up     yearly follow up both eyes.     HPI    Affected eye(s):  Both   Symptoms:     Floaters (Comment: Occasional floaters BE, no changes.)   No flashes   No redness   No tearing   No Dryness   No itching         Do you have eye pain now?:  No      Comments:  Pt states vision has been good both distance and near. Both eyes feel good today.     Max SIMPSON July 11, 2018 1:29 PM

## 2018-07-13 ENCOUNTER — OFFICE VISIT (OUTPATIENT)
Dept: CARDIOLOGY | Facility: CLINIC | Age: 68
End: 2018-07-13
Attending: NURSE PRACTITIONER
Payer: MEDICARE

## 2018-07-13 ENCOUNTER — TELEPHONE (OUTPATIENT)
Dept: CARDIOLOGY | Facility: CLINIC | Age: 68
End: 2018-07-13

## 2018-07-13 VITALS
WEIGHT: 173.7 LBS | DIASTOLIC BLOOD PRESSURE: 78 MMHG | SYSTOLIC BLOOD PRESSURE: 136 MMHG | BODY MASS INDEX: 26.33 KG/M2 | OXYGEN SATURATION: 97 % | HEART RATE: 80 BPM | HEIGHT: 68 IN

## 2018-07-13 DIAGNOSIS — E78.1 HYPERTRIGLYCERIDEMIA: ICD-10-CM

## 2018-07-13 DIAGNOSIS — I10 HYPERTENSION GOAL BP (BLOOD PRESSURE) < 140/80: ICD-10-CM

## 2018-07-13 DIAGNOSIS — E78.5 HYPERLIPIDEMIA LDL GOAL <100: Primary | ICD-10-CM

## 2018-07-13 DIAGNOSIS — I48.0 PAROXYSMAL ATRIAL FIBRILLATION (H): ICD-10-CM

## 2018-07-13 PROCEDURE — G0463 HOSPITAL OUTPT CLINIC VISIT: HCPCS | Mod: ZF

## 2018-07-13 PROCEDURE — 99214 OFFICE O/P EST MOD 30 MIN: CPT | Mod: ZP | Performed by: NURSE PRACTITIONER

## 2018-07-13 RX ORDER — OMEGA-3-ACID ETHYL ESTERS 1 G/1
2 CAPSULE, LIQUID FILLED ORAL 2 TIMES DAILY
Qty: 180 CAPSULE | Refills: 7 | Status: SHIPPED | OUTPATIENT
Start: 2018-07-13 | End: 2018-07-13

## 2018-07-13 RX ORDER — OMEGA-3-ACID ETHYL ESTERS 1 G/1
2 CAPSULE, LIQUID FILLED ORAL 2 TIMES DAILY
Qty: 360 CAPSULE | Refills: 3 | Status: SHIPPED | OUTPATIENT
Start: 2018-07-13 | End: 2018-07-23

## 2018-07-13 ASSESSMENT — PAIN SCALES - GENERAL: PAINLEVEL: NO PAIN (0)

## 2018-07-13 NOTE — TELEPHONE ENCOUNTER
Health Call Center    Phone Message    May a detailed message be left on voicemail: no    Reason for Call: Medication Question or concern regarding medication   Prescription Clarification  Name of Medication: omega-3 acid ethyl esters (LOVAZA) 1 g capsule  Prescribing Provider: Britt Oswald NP   [facility]   What on the order needs clarification? June, pharmacist, called to request that the Pt receive a 90 day supply of omega 3 instead of only a 45 day supply at 180 pills. They would like to double the amount dispensed to the patient. Please contact to discuss.          Action Taken: Message routed to:  [facility] ([facility]ARDIOLOGY ADULT [facility]

## 2018-07-13 NOTE — MR AVS SNAPSHOT
After Visit Summary   2018    Luz Thompson    MRN: [id number]           Patient Information     Date Of Birth          1950        Visit Information        Provider Department      2018 1:00 PM Britt Oswald NP [facility]        Today's Diagnoses     Hyperlipidemia LDL goal <100    -  1    Hypertriglyceridemia        Hypertension goal BP (blood pressure) < 140/80        Paroxysmal atrial fibrillation (H)          Care Instructions    You were seen today in the Cardiovascular Clinic at the [facility].    Cardiology provider you saw during your visit Britt Oswald NP.    1. Your BP is well controlled, continue current medications.  2. Continue Zetia 10 mg and start lovaza 2 G twice daily.  3. Follow a heart healthy diet and 30 minutes aerobic exercise daily.  4. Please make a follow-up appointment with Dr. Reyna or myself in 1 year.     Questions and [phone].   First press #1 for the University and then press #3 for Medical Questions to reach the Cardiology triage nurse.     On Call Cardiologist for after hours or on weekends: [phone], press option #4 and ask to speak to the on-call Cardiologist.             Follow-ups after your visit        Additional Services     Follow-Up with Cardiologist                 Follow-up notes from your care team     Return in about 1 year (around 2019).      Your next 10 appointments already scheduled     2018  1:45 PM CDT   [facility]   [facility]    [address] [place] [id number] [phone]            2018  9:30 AM CDT   (Arrive by 9:15 AM)   Return Visit with Crystal Montero MD   [facility] Infect[facility] ([facility] [place])    [address] [place] [address]  [address]  [facility] [address] [phone]            2018  8:00 PM CDT    PSG Split with SLEEP STUDY RM 3   [facility] [place], Sleep Study ([facility])    [address] [place] [id number] [phone]            Aug 06, 2018  9:00 AM CDT   (Arrive by 8:45 AM)   RETURN ENDOCRINE with Rach Vasquez MD   [facility] Endocrinology ([facility] [place])    [address]  3rd Floor  [facility] [id number] [phone]            Aug 13, 2018  1:00 PM CDT   Return Sleep Patient with Vero Quiñonez MD   [facility] [place], Sleep Study ([facility])    [address] [place] [id number] [phone]            Aug 15, 2018 11:30 AM CDT   (Arrive by 11:15 AM)   RETURN ATRIAL FIBULATION VISIT with LIZ Sauceda CNP   [facility] Heart Care ([facility] [place])    [address]  [address]  [facility] [address] [phone]            Sep 14, 2018 10:00 AM CDT   CT CHEST W/O CONTRAST with UCCT2   [facility] [place] [facility] ([facility])    [address]  [address] [facility] [address] [phone]           Please bring any scans or X-rays taken at other hospitals, if similar tests were done. Also bring a list of your medicines, including vitamins, minerals and over-the-counter drugs. It is safest to leave personal items at home.  Be sure to tell your doctor:   If you have any allergies.   If there s any chance you are pregnant.   If you are breastfeeding.  You do not need to do anything special to prepare for this exam.  Please wear loose clothing, such as a sweat suit or jogging clothes. Avoid snaps, zippers and other metal. We may ask you to undress and put on a hospital gown.            Sep 14, 2018 11:00 AM CDT   (Arrive by 10:45 AM)   Return Visit with LIZ Gamboa CNP [facility] ([facility]  "[place])    [address]  [address]  [facility] [address] [phone]              Future tests that were ordered for you today     Open Future Orders        Priority Expected Expires Ordered    Follow-Up with Cardiologist Routine 7/13/2019 7/14/2019 7/13/2018            Who to contact     If you have questions or need follow up information about today's clinic visit or your schedule please contact [facility] directly at [phone].  Normal or non-critical lab and imaging results will be communicated to you by SavvySynchart, letter or phone within 4 business days after the clinic has received the results. If you do not hear from us within 7 days, please contact the clinic through Ads-Fit or phone. If you have a critical or abnormal lab result, we will notify you by phone as soon as possible.  Submit refill requests through Shopalytic or call your pharmacy and they will forward the refill request to us. Please allow 3 business days for your refill to be completed.          Additional Information About Your Visit        Shopalytic Information     Shopalytic gives you secure access to your electronic health record. If you see a primary care provider, you can also send messages to your care team and make appointments. If you have questions, please call your primary care clinic.  If you do not have a primary care provider, please call [phone] and they will assist you.        Care EveryWhere ID     This is your Care EveryWhere ID. This could be used by other organizations to access your [place] medical records  [id number]        Your Vitals Were     Pulse Height Last Period Pulse Oximetry BMI (Body Mass Index)       80 1.715 m (5' 7.5\") 06/01/1988 (Approximate) 97% 26.8 kg/m2        Blood Pressure from Last 3 Encounters:   07/13/18 136/78   06/29/18 152/76   06/27/18 128/74    Weight from Last 3 Encounters:   07/13/18 78.8 kg (173 lb 11.2 oz)   06/29/18 79.4 kg (175 lb)   06/27/18 80.5 kg (177 lb 8 oz) "                 Today's Medication Changes          These changes are accurate as of 7/13/18  1:53 PM.  If you have any questions, ask your nurse or doctor.               Start taking these medicines.        Dose/Directions    omega-3 acid ethyl esters 1 g capsule   Commonly known as:  LOVAZA   Used for:  Hypertriglyceridemia   Started by:  Britt Oswald NP        Dose:  2 g   Take 2 capsules (2 g) by mouth 2 times daily   Quantity:  180 capsule   Refills:  7            Where to get your medicines      These medications were sent to [id number] - [place], [place] - [address]  [address], [place] [address]    Hours:  test fax successful 4/5/04 kr Phone:  [phone]     omega-3 acid ethyl esters 1 g capsule                Primary Care Provider Office Phone # Fax #    Jennifer Barraza [phone]       [address] [place]  [facility] [address]        Equal Access to Services     [facility] AH: Hadii aad ku hadasho Soomaali, waaxda luqadaha, qaybta kaalmada adeegyada, waxay idiin hayaan adeeg kharash aly . So [place] [phone].    ATENCIÓN: Si habla español, tiene a jason disposición servicios gratuitos de asistencia lingüística. Llame al [phone].    We comply with applicable federal civil rights laws and [place] laws. We do not discriminate on the basis of race, color, national origin, age, disability, sex, sexual orientation, or gender identity.            Thank you!     Thank you for choosing [facility]  for your care. Our goal is always to provide you with excellent care. Hearing back from our patients is one way we can continue to improve our services. Please take a few minutes to complete the written survey that you may receive in the mail after your visit with us. Thank you!             Your Updated Medication List - Protect others around you: Learn how to safely use, store and throw away your medicines at www.disposemymeds.org.           This list is accurate as of 7/13/18  1:53 PM.  Always use your most recent med list.                   Brand Name Dispense Instructions for use Diagnosis    acetaminophen 500 MG Caps      Take 1,000 mg by mouth nightly as needed Take 500-1,000 mg by mouth every 6 hours if needed.        ARTIFICIAL TEARS OP      Apply 1 drop to eye as needed        BENEFIBER Powd      2 teaspoons daily        calcitRIOL 0.5 MCG capsule    ROCALTROL    90 capsule    Take 1 capsule (0.5 mcg) by mouth daily    Postablative hypoparathyroidism (H)       clotrimazole 1 % cream    LOTRIMIN     Apply topically 2 times daily as needed Reported on 4/25/2017        ELIQUIS 2.5 MG tablet   Generic drug:  apixaban ANTICOAGULANT      Take 2.5 mg by mouth 2 times daily        estradiol 0.1 MG/GM cream    ESTRACE VAGINAL    42.5 g    Place 2 g vaginally twice a week    Atrophic vaginitis       ezetimibe 10 MG tablet    ZETIA    30 tablet    Take 10 mg by mouth every evening        folic acid 1 MG tablet    FOLVITE    100 tablet    Take 1 tablet (1 mg) by mouth daily    Liver replaced by transplant (H)       furosemide 20 MG tablet    LASIX    46 tablet    Take 0.5 tablets (10 mg) by mouth daily    CKD (chronic kidney disease) stage 3, GFR 30-59 ml/min, Liver replaced by transplant (H)       hydrALAZINE 25 MG tablet    APRESOLINE    270 tablet    Take 0.5 tablets (12.5 mg) by mouth 3 times daily as needed , if systolic blood pressure is more than 140 mmHg.    HTN (hypertension)       ketoconazole 2 % cream    NIZORAL    15 g    Apply topically 2 times daily To angles of mouth    Angular cheilitis       meclizine 25 MG tablet    ANTIVERT    30 tablet    Take 1 tablet (25 mg) by mouth as needed    Dizziness       metoprolol succinate 50 MG 24 hr tablet    TOPROL-XL    90 tablet    Take 1 tablet (50 mg) by mouth daily    Paroxysmal atrial fibrillation (H)       neomycin-polymyxin-hydrocortisone 3.5-50771-9 otic suspension    CORTISPORIN    10 mL     Place 4 drops Into the left ear 4 times daily    Acute swimmer's ear of left side       nitroFURantoin (macrocrystal-monohydrate) 100 MG capsule    MACROBID    14 capsule    Take 1 capsule (100 mg) by mouth 2 times daily For one week at onset of UTI symptoms    Urinary tract infection without hematuria, site unspecified       omega-3 acid ethyl esters 1 g capsule    LOVAZA    180 capsule    Take 2 capsules (2 g) by mouth 2 times daily    Hypertriglyceridemia       predniSONE 5 MG tablet    DELTASONE    90 tablet    Take 1 tablet (5 mg) by mouth daily    Thyroid cancer (H), Liver replaced by transplant (H)       PRESERVISION AREDS 2 Caps      Take 1 capsule by mouth 2 times daily        sertraline 50 MG tablet    ZOLOFT    90 tablet    Take 1 tablet (50 mg) by mouth daily    Adjustment disorder with depressed mood       sirolimus 0.5 MG Tabs tablet      Take 0.5 mg by mouth every other day        SUMAtriptan 50 MG tablet    IMITREX    30 tablet    Take 1 tablet (50 mg) by mouth at onset of headache for migraine repeat after 2 hours if needed.    Migraine without aura and without status migrainosus, not intractable       * SYNTHROID 150 MCG tablet   Generic drug:  levothyroxine      Take 225 mcg by mouth on Mondays        * levothyroxine 150 MCG tablet    SYNTHROID    120 tablet    Take 150mcg by mouth daily 6 days a week & 225mcg on Mondays    Malignant neoplasm of thyroid gland (H)       tetrahydrozoline 0.05 % ophthalmic solution      1 drop 3 times daily        triamcinolone 0.1 % cream    KENALOG     Apply topically 2 times daily as needed for irritation        UNABLE TO FIND      MEDICATION NAME: prescription eye drops  For pink eye (second eye drop prescribed)        ursodiol 250 MG tablet    ACTIGALL    180 tablet    Take 1 tablet (250 mg) by mouth 2 times daily    Liver replaced by transplant (H)       voriconazole 200 MG tablet    VFEND    60 tablet    Take 1 tablet (200 mg) by mouth 2 times daily     Coccidioidal pneumonitis (H)       * Notice:  This list has 2 medication(s) that are the same as other medications prescribed for you. Read the directions carefully, and ask your doctor or other care provider to review them with you.

## 2018-07-13 NOTE — LETTER
7/13/2018      RE: Luz Thompson  [address]  [facility] [address]       Dear Colleague,    Thank you for the opportunity to participate in the care of your patient, Luz Thompson, at the [facility] HEART [facility]. Please see a copy of my visit note below.    Cardiology Clinic Note  July 13, 2018      HPI:  Luz Thompson is a 68 year old with a past medical history significant for paroxysmal a-fib on apixiban, hypertension, hyperlipidemia, hypertriglyceridemia, statin intolerance, CKD and PBC s/p liver transplant in 2002 who presents for routine follow-up of lipid management. The patient was referred to Dr. Reyna in 6/2017 for lipid management. At the time she was noted to have an elevated LDL (198), total cholesterol (322), and triglyceride (448) levels. She did not tolerate simvastatin or pravastatin due to myalgias. She was started on Zetia 10 mg which she is now tolerating. Most recent lipid panel shows slight improvement in LDL to 150 but increase in triglycerides to 557.   Today Ms. Thompson reports feeling well. She has had a difficult year and is currently going through a divorce. She was homeless for a period of time, but has now found an apartment in [place]. She has been working out with a personal training 2 days a week. She reports occasional shortness of breath with exertion but denies exertional angina. She denies orthopnea, PND or weight gain. She reports an episode of lightheadedness and syncope last weekend while in the shower. She denies chest pain or palpitations preceding the event. She thinks she was out for a few seconds and woke up feeling tired. She slept most of the day and woke up feeling fine. She denies recurrent symptoms. In regards to diet, she generally eats pretty healthy but hasn't had much of an appetite lately due to emotional stressors. For the past week her blood pressure has been running 140/80s and she has  "needed PRN hydralazine a few times. She reports occasional low blood pressures in the 100s/50s.     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      Diverticulosis of sigmoid colon 12/21/2013     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     Received Rituxan during Summer of 2016     H/O esophageal varices      Hearing loss      Heart murmur 4/4/2012     History of DVT (deep vein thrombosis)      History of [facility] fever      History of thyroid cancer 9/25/2012     Hyperlipidemia 4/10/2012    Says that she does not have it anymore, not on meds     Hyperlipidemia LDL goal <70      Hypertension goal BP (blood pressure) < 140/80 11/06/2013     Hypertriglyceridemia      Liver replaced by transplant (H) 10/17/2011    Dr. 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    calcitRIOL (ROCALTROL) 0.5 MCG capsule Take 1 capsule (0.5 mcg) by mouth daily   clotrimazole (LOTRIMIN) 1 % cream Apply topically 2 " times daily as needed Reported on 4/25/2017   ELIQUIS 2.5 MG tablet Take 2.5 mg by mouth 2 times daily    estradiol (ESTRACE VAGINAL) 0.1 MG/GM cream Place 2 g vaginally twice a week   ezetimibe (ZETIA) 10 MG tablet Take 10 mg by mouth every evening    folic acid (FOLVITE) 1 MG tablet Take 1 tablet (1 mg) by mouth daily   furosemide (LASIX) 20 MG tablet Take 0.5 tablets (10 mg) by mouth daily   hydrALAZINE (APRESOLINE) 25 MG tablet Take 0.5 tablets (12.5 mg) by mouth 3 times daily as needed , if systolic blood pressure is more than 140 mmHg.   Hypromellose (ARTIFICIAL TEARS OP) Apply 1 drop to eye as needed   ketoconazole (NIZORAL) 2 % cream Apply topically 2 times daily To angles of mouth   levothyroxine (SYNTHROID) 150 MCG tablet Take 150mcg by mouth daily 6 days a week & 225mcg on Mondays   levothyroxine (SYNTHROID) 150 MCG tablet Take 225 mcg by mouth on Mondays   meclizine (ANTIVERT) 25 MG tablet Take 1 tablet (25 mg) by mouth as needed   metoprolol (TOPROL-XL) 50 MG 24 hr tablet Take 1 tablet (50 mg) by mouth daily   Multiple Vitamins-Minerals (PRESERVISION AREDS 2) CAPS Take 1 capsule by mouth 2 times daily   neomycin-polymyxin-hydrocortisone (CORTISPORIN) 3.5-85201-5 otic suspension Place 4 drops Into the left ear 4 times daily   nitroFURantoin, macrocrystal-monohydrate, (MACROBID) 100 MG capsule Take 1 capsule (100 mg) by mouth 2 times daily For one week at onset of UTI symptoms   predniSONE (DELTASONE) 5 MG tablet Take 1 tablet (5 mg) by mouth daily   RAPAMUNE (BRAND) 0.5 MG PO TABLET Take 0.5 mg by mouth every other day   sertraline (ZOLOFT) 50 MG tablet Take 1 tablet (50 mg) by mouth daily   SUMAtriptan (IMITREX) 50 MG tablet Take 1 tablet (50 mg) by mouth at onset of headache for migraine repeat after 2 hours if needed.   tetrahydrozoline 0.05 % ophthalmic solution 1 drop 3 times daily   triamcinolone (KENALOG) 0.1 % cream Apply topically 2 times daily as needed for irritation   UNABLE TO FIND  MEDICATION NAME: prescription eye drops  For pink eye (second eye drop prescribed)   ursodiol (ACTIGALL) 250 MG tablet Take 1 tablet (250 mg) by mouth 2 times daily   voriconazole (VFEND) 200 MG tablet Take 1 tablet (200 mg) by mouth 2 times daily   Wheat Dextrin (BENEFIBER) POWD 2 teaspoons daily     No current facility-administered medications on file prior to visit.     Allergies:      Allergies   Allergen Reactions     Fluconazole Hives and Itching     Ciprofloxacin Anxiety and Other (See Comments)     Irregular heart beat     Azithromycin Itching     Benadryl [Diphenhydramine Hcl]      Insomnia      Cellcept Diarrhea     Ciprofloxacin Other (See Comments)     Insomnia, mood lability     Codeine      Psych disturbance     Codeine      Diphenhydramine Other (See Comments)     Doxycycline      Lansoprazole Diarrhea     Levaquin [Levofloxacin] Other (See Comments)     Headache, hyperactivity     Lisinopril Cough     Methotrexate      Sores     Methotrexate      Morphine Sulfate Itching     Mycophenolate Diarrhea     No Clinical Screening - See Comments      Simvastatin Muscle Pain (Myalgia)     severe     Cephalexin Rash     Fever and skin burning     Penicillin G Itching and Rash     Tolectin [Nsaids] Rash     Tramadol Rash       Family and social history:    Family History   Problem Relation Age of Onset     Hypertension Mother      Endometrial Cancer Mother      Hyperlipidemia Mother      Prostate Cancer Father      Macular Degeneration Father      Cancer - colorectal Maternal Grandmother      in her 80's, has surgery and removal of part of kidney,  at age 98     HEART DISEASE Maternal Grandfather       at 98     Glaucoma Maternal Grandfather      Cerebrovascular Disease Paternal Grandmother      in her 80's     Hypertension Paternal Grandmother      HEART DISEASE Paternal Grandfather      MI     Alzheimer Disease Paternal Grandfather      Allergies Son      Neurologic Disorder Daughter      Migraines  "    Breast Cancer Other      Anesthesia Reaction No family hx of      Crohn Disease No family hx of      Ulcerative Colitis No family hx of        Social History     Social History     Marital status: Legally      Spouse name: Robbin Thompson     Number of children: 4     Years of education: 20     Occupational History     Guardian Conservator  [employer]     social work      Self     Social History Main Topics     Smoking status: Former Smoker     Packs/day: 1.00     Years: 18.00     Types: Cigarettes     Quit date: 4/12/1985     Smokeless tobacco: Never Used     Alcohol use 0.0 oz/week     0 Standard drinks or equivalent per week      Comment: rare - \"I toast at weddings\"     Drug use: No     Sexual activity: Yes     Partners: Male     Birth control/ protection: Post-menopausal     Other Topics Concern     Exercise Yes     cardio and strengthing     Social History Narrative    She is retired. She and her  travel around the [place] in an RV. [place] usually are in the [place] the course of the winter. 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         Review of Systems:  Skin: No skin rash or ulcers.  Eyes: No red eye.  Ears/Nose/Throat: No ear discharge, nasal congestion, sore throat or dysphagia.  Respiratory: No cough or hemoptysis.  Cardiovascular: See HPI.    Gastrointestinal: No abdominal pain, nausea, vomiting, hematemesis or melena.  Genitourinary: No increased frequency or urgency of urine. No dysuria or hematuria.  Musculoskeletal: No polyarthralgia or myalgias.  Neurologic: No headaches, seizure or focal weakness.  Psychiatric: No hallucinations.  Hematologic/Lymphatic/Immunologic: No bleeding tendency.  Endocrine: No heat or cold intolerance, abnormal facial hair or alopecia.    Vital signs:  /78 (BP Location: Left arm, Patient Position: Chair, Cuff Size: Adult Regular)  Pulse 80  Ht " "1.715 m (5' 7.5\")  Wt 78.8 kg (173 lb 11.2 oz)  LMP 06/01/1988 (Approximate)  SpO2 97%  BMI 26.8 kg/m2   Wt Readings from Last 2 Encounters:   07/13/18 78.8 kg (173 lb 11.2 oz)   06/29/18 79.4 kg (175 lb)       Physical Exam:  Gen: NAD.    HEENT: No conjunctival pallor or scleral icterus, MMM. Clear oropharynx.    Neck: No JVD. No thyroid enlargement or cervical adenopathy.    Chest: Clear to auscultation bilaterally.    CV: Normal first and second heart sounds. 2/6 CHRISTOPHER  Abdomen: Soft, non-tender, non-distended, BS+.  Ext: No edema. Warm and well perfused with normal capillary refill.    Skin: No skin rash or ulcers.  Neuro: alert, oriented and appropriately conversant.    Psych: Normal affect and speech.    Labs:    LDL Cholesterol Calculated   Date Value Ref Range Status   05/18/2018  <100 mg/dL Final    Cannot estimate LDL when triglyceride exceeds 400 mg/dL   10/05/2017  <100 mg/dL Final    Cannot estimate LDL when triglyceride exceeds 400 mg/dL     LDL Cholesterol Direct   Date Value Ref Range Status   05/18/2018 150 (H) <100 mg/dL Final     Comment:     Above desirable:  100-129 mg/dl  Borderline High:  130-159 mg/dL  High:             160-189 mg/dL  Very high:       >189 mg/dl     10/05/2017 135 (H) <100 mg/dL Final     Comment:     Above desirable:  100-129 mg/dl  Borderline High:  130-159 mg/dL  High:             160-189 mg/dL  Very high:       >189 mg/dl       Recent Labs   Lab Test  05/18/18   0731  10/05/17   0907   09/18/15   0954  05/08/14   0806   CHOL  265*  231*   < >  181  159   HDL  49*  50   < >  63  48*   LDL  Cannot estimate LDL when triglyceride exceeds 400 mg/dL  150*  Cannot estimate LDL when triglyceride exceeds 400 mg/dL  135*   < >  84  88   TRIG  557*  419*   < >  172*  112   CHOLHDLRATIO   --    --    --   2.9  3.3    < > = values in this interval not displayed.       Diagnostics:    Echo 5/2018:  1. The left ventricle is normal in structure, function and size. The " visual  ejection fraction is estimated at 60%.  2. The right ventricle is normal in structure, function and size.  3. There is mild to moderate (1-2+) mitral regurgitation.  4. There is no atrial shunt seen. A contrast injection (Bubble Study) was  performed that was negative for flow across the interatrial septum.    Assessment and Plan:  Luz Thompson is a 68 year old with a past medical history significant for paroxysmal a-fib on apixiban, hypertension, hyperlipidemia and hypertriglyceridemia with statin intolerance, CKD and PBC s/p liver transplant in 2002 who presents for routine follow-up.  Hyperlipidemia and hypertriglyceridemia:  - Continue zetia 10 mg and start lovaza 2 G BID  - Repeat fasting lipids in 3 months  Hypertension:  - Well controlled  - Current current regimen  Vasovagal syncope:   - Recent syncopal event consistent with a vasovagal event  - Recommend avoiding potential triggers and lowering herself to the ground when she notices onset of symptoms  - Increase oral hydration and eat every few hours to avoid hypoglycemia    Follow-up: RTC in 1 year.    Please do not hesitate to contact me if you have any questions/concerns.     Sincerely,     Britt Oswald NP

## 2018-07-13 NOTE — PROGRESS NOTES
Cardiology Clinic Note  July 13, 2018      HPI:  Luz Thompson is a 68 year old with a past medical history significant for paroxysmal a-fib on apixiban, hypertension, hyperlipidemia, hypertriglyceridemia, statin intolerance, CKD and PBC s/p liver transplant in 2002 who presents for routine follow-up of lipid management. The patient was referred to Dr. Reyna in 6/2017 for lipid management. At the time she was noted to have an elevated LDL (198), total cholesterol (322), and triglyceride (448) levels. She did not tolerate simvastatin or pravastatin due to myalgias. She was started on Zetia 10 mg which she is now tolerating. Most recent lipid panel shows slight improvement in LDL to 150 but increase in triglycerides to 557.   Today Ms. Thompson reports feeling well. She has had a difficult year and is currently going through a divorce. She was homeless for a period of time, but has now found an apartment in [place]. She has been working out with a personal training 2 days a week. She reports occasional shortness of breath with exertion but denies exertional angina. She denies orthopnea, PND or weight gain. She reports an episode of lightheadedness and syncope last weekend while in the shower. She denies chest pain or palpitations preceding the event. She thinks she was out for a few seconds and woke up feeling tired. She slept most of the day and woke up feeling fine. She denies recurrent symptoms. In regards to diet, she generally eats pretty healthy but hasn't had much of an appetite lately due to emotional stressors. For the past week her blood pressure has been running 140/80s and she has needed PRN hydralazine a few times. She reports occasional low blood pressures in the 100s/50s.     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      Diverticulosis of sigmoid colon 12/21/2013     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     Received Rituxan during Summer of 2016     H/O esophageal varices      Hearing loss      Heart murmur 4/4/2012     History of DVT (deep vein thrombosis)      History of [facility] fever      History of thyroid cancer 9/25/2012     Hyperlipidemia 4/10/2012    Says that she does not have it anymore, not on meds     Hyperlipidemia LDL goal <70      Hypertension goal BP (blood pressure) < 140/80 11/06/2013     Hypertriglyceridemia      Liver replaced by transplant (H) 10/17/2011    Dr. 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    calcitRIOL (ROCALTROL) 0.5 MCG capsule Take 1 capsule (0.5 mcg) by mouth daily   clotrimazole (LOTRIMIN) 1 % cream Apply topically 2 times daily as needed Reported on 4/25/2017   ELIQUIS 2.5 MG tablet Take 2.5 mg by mouth 2 times daily    estradiol (ESTRACE VAGINAL) 0.1 MG/GM cream Place 2 g vaginally twice a week   ezetimibe (ZETIA) 10 MG tablet Take 10 mg by mouth every evening    folic acid (FOLVITE) 1 MG tablet Take 1 tablet (1 mg) by mouth daily "   furosemide (LASIX) 20 MG tablet Take 0.5 tablets (10 mg) by mouth daily   hydrALAZINE (APRESOLINE) 25 MG tablet Take 0.5 tablets (12.5 mg) by mouth 3 times daily as needed , if systolic blood pressure is more than 140 mmHg.   Hypromellose (ARTIFICIAL TEARS OP) Apply 1 drop to eye as needed   ketoconazole (NIZORAL) 2 % cream Apply topically 2 times daily To angles of mouth   levothyroxine (SYNTHROID) 150 MCG tablet Take 150mcg by mouth daily 6 days a week & 225mcg on Mondays   levothyroxine (SYNTHROID) 150 MCG tablet Take 225 mcg by mouth on Mondays   meclizine (ANTIVERT) 25 MG tablet Take 1 tablet (25 mg) by mouth as needed   metoprolol (TOPROL-XL) 50 MG 24 hr tablet Take 1 tablet (50 mg) by mouth daily   Multiple Vitamins-Minerals (PRESERVISION AREDS 2) CAPS Take 1 capsule by mouth 2 times daily   neomycin-polymyxin-hydrocortisone (CORTISPORIN) 3.5-96181-2 otic suspension Place 4 drops Into the left ear 4 times daily   nitroFURantoin, macrocrystal-monohydrate, (MACROBID) 100 MG capsule Take 1 capsule (100 mg) by mouth 2 times daily For one week at onset of UTI symptoms   predniSONE (DELTASONE) 5 MG tablet Take 1 tablet (5 mg) by mouth daily   RAPAMUNE (BRAND) 0.5 MG PO TABLET Take 0.5 mg by mouth every other day   sertraline (ZOLOFT) 50 MG tablet Take 1 tablet (50 mg) by mouth daily   SUMAtriptan (IMITREX) 50 MG tablet Take 1 tablet (50 mg) by mouth at onset of headache for migraine repeat after 2 hours if needed.   tetrahydrozoline 0.05 % ophthalmic solution 1 drop 3 times daily   triamcinolone (KENALOG) 0.1 % cream Apply topically 2 times daily as needed for irritation   UNABLE TO FIND MEDICATION NAME: prescription eye drops  For pink eye (second eye drop prescribed)   ursodiol (ACTIGALL) 250 MG tablet Take 1 tablet (250 mg) by mouth 2 times daily   voriconazole (VFEND) 200 MG tablet Take 1 tablet (200 mg) by mouth 2 times daily   Wheat Dextrin (BENEFIBER) POWD 2 teaspoons daily     No current  facility-administered medications on file prior to visit.     Allergies:      Allergies   Allergen Reactions     Fluconazole Hives and Itching     Ciprofloxacin Anxiety and Other (See Comments)     Irregular heart beat     Azithromycin Itching     Benadryl [Diphenhydramine Hcl]      Insomnia      Cellcept Diarrhea     Ciprofloxacin Other (See Comments)     Insomnia, mood lability     Codeine      Psych disturbance     Codeine      Diphenhydramine Other (See Comments)     Doxycycline      Lansoprazole Diarrhea     Levaquin [Levofloxacin] Other (See Comments)     Headache, hyperactivity     Lisinopril Cough     Methotrexate      Sores     Methotrexate      Morphine Sulfate Itching     Mycophenolate Diarrhea     No Clinical Screening - See Comments      Simvastatin Muscle Pain (Myalgia)     severe     Cephalexin Rash     Fever and skin burning     Penicillin G Itching and Rash     Tolectin [Nsaids] Rash     Tramadol Rash       Family and social history:    Family History   Problem Relation Age of Onset     Hypertension Mother      Endometrial Cancer Mother      Hyperlipidemia Mother      Prostate Cancer Father      Macular Degeneration Father      Cancer - colorectal Maternal Grandmother      in her 80's, has surgery and removal of part of kidney,  at age 98     HEART DISEASE Maternal Grandfather       at 98     Glaucoma Maternal Grandfather      Cerebrovascular Disease Paternal Grandmother      in her 80's     Hypertension Paternal Grandmother      HEART DISEASE Paternal Grandfather      MI     Alzheimer Disease Paternal Grandfather      Allergies Son      Neurologic Disorder Daughter      Migraines     Breast Cancer Other      Anesthesia Reaction No family hx of      Crohn Disease No family hx of      Ulcerative Colitis No family hx of        Social History     Social History     Marital status: Legally      Spouse name: Robbin Thompson     Number of children: 4     Years of education: 20  "    Occupational History     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 She and her  travel around the [place] in an RV. [place] usually are in the [place] over the course of the winter. 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         Review of Systems:  Skin: No skin rash or ulcers.  Eyes: No red eye.  Ears/Nose/Throat: No ear discharge, nasal congestion, sore throat or dysphagia.  Respiratory: No cough or hemoptysis.  Cardiovascular: See HPI.    Gastrointestinal: No abdominal pain, nausea, vomiting, hematemesis or melena.  Genitourinary: No increased frequency or urgency of urine. No dysuria or hematuria.  Musculoskeletal: No polyarthralgia or myalgias.  Neurologic: No headaches, seizure or focal weakness.  Psychiatric: No hallucinations.  Hematologic/Lymphatic/Immunologic: No bleeding tendency.  Endocrine: No heat or cold intolerance, abnormal facial hair or alopecia.    Vital signs:  /78 (BP Location: Left arm, Patient Position: Chair, Cuff Size: Adult Regular)  Pulse 80  Ht 1.715 m (5' 7.5\")  Wt 78.8 kg (173 lb 11.2 oz)  LMP 06/01/1988 (Approximate)  SpO2 97%  BMI 26.8 kg/m2   Wt Readings from Last 2 Encounters:   07/13/18 78.8 kg (173 lb 11.2 oz)   06/29/18 79.4 kg (175 lb)       Physical Exam:  Gen: NAD.    HEENT: No conjunctival pallor or scleral icterus, MMM. Clear " oropharynx.    Neck: No JVD. No thyroid enlargement or cervical adenopathy.    Chest: Clear to auscultation bilaterally.    CV: Normal first and second heart sounds. 2/6 CHRISTOPHER  Abdomen: Soft, non-tender, non-distended, BS+.  Ext: No edema. Warm and well perfused with normal capillary refill.    Skin: No skin rash or ulcers.  Neuro: alert, oriented and appropriately conversant.    Psych: Normal affect and speech.    Labs:    LDL Cholesterol Calculated   Date Value Ref Range Status   05/18/2018  <100 mg/dL Final    Cannot estimate LDL when triglyceride exceeds 400 mg/dL   10/05/2017  <100 mg/dL Final    Cannot estimate LDL when triglyceride exceeds 400 mg/dL     LDL Cholesterol Direct   Date Value Ref Range Status   05/18/2018 150 (H) <100 mg/dL Final     Comment:     Above desirable:  100-129 mg/dl  Borderline High:  130-159 mg/dL  High:             160-189 mg/dL  Very high:       >189 mg/dl     10/05/2017 135 (H) <100 mg/dL Final     Comment:     Above desirable:  100-129 mg/dl  Borderline High:  130-159 mg/dL  High:             160-189 mg/dL  Very high:       >189 mg/dl       Recent Labs   Lab Test  05/18/18   0731  10/05/17   0907   09/18/15   0954  05/08/14   0806   CHOL  265*  231*   < >  181  159   HDL  49*  50   < >  63  48*   LDL  Cannot estimate LDL when triglyceride exceeds 400 mg/dL  150*  Cannot estimate LDL when triglyceride exceeds 400 mg/dL  135*   < >  84  88   TRIG  557*  419*   < >  172*  112   CHOLHDLRATIO   --    --    --   2.9  3.3    < > = values in this interval not displayed.       Diagnostics:    Echo 5/2018:  1. The left ventricle is normal in structure, function and size. The visual  ejection fraction is estimated at 60%.  2. The right ventricle is normal in structure, function and size.  3. There is mild to moderate (1-2+) mitral regurgitation.  4. There is no atrial shunt seen. A contrast injection (Bubble Study) was  performed that was negative for flow across the interatrial  septum.    Assessment and Plan:  Luz Thompson is a 68 year old with a past medical history significant for paroxysmal a-fib on apixiban, hypertension, hyperlipidemia and hypertriglyceridemia with statin intolerance, CKD and PBC s/p liver transplant in 2002 who presents for routine follow-up.  Hyperlipidemia and hypertriglyceridemia:  - Continue zetia 10 mg and start lovaza 2 G BID  - Repeat fasting lipids in 3 months  Hypertension:  - Well controlled  - Current current regimen  Vasovagal syncope:   - Recent syncopal event consistent with a vasovagal event  - Recommend avoiding potential triggers and lowering herself to the ground when she notices onset of symptoms  - Increase oral hydration and eat every few hours to avoid hypoglycemia    Follow-up: RTC in 1 year.

## 2018-07-13 NOTE — NURSING NOTE
Chief Complaint   Patient presents with     Follow Up For      reason for visit: 67 y/o female for 1 year f/u of hypertension and PAF.     Vitals were taken and medications were reconciled.   NORMA Candelario  1:11 PM

## 2018-07-13 NOTE — PATIENT INSTRUCTIONS
You were seen today in the Cardiovascular Clinic at the [facility].    Cardiology provider you saw during your visit Britt Oswald NP.    1. Your BP is well controlled, continue current medications.  2. Continue Zetia 10 mg and start lovaza 2 G twice daily.  3. Follow a heart healthy diet and 30 minutes aerobic exercise daily.  4. Please make a follow-up appointment with Dr. Reyna or myself in 1 year.     Questions and [phone].   First press #1 for the University and then press #3 for Medical Questions to reach the Cardiology triage nurse.     On Call Cardiologist for after hours or on weekends: [phone], press option #4 and ask to speak to the on-call Cardiologist.

## 2018-07-16 ENCOUNTER — OFFICE VISIT (OUTPATIENT)
Dept: OTOLARYNGOLOGY | Facility: CLINIC | Age: 68
End: 2018-07-16
Payer: MEDICARE

## 2018-07-16 VITALS
WEIGHT: 172 LBS | TEMPERATURE: 98.6 F | RESPIRATION RATE: 18 BRPM | DIASTOLIC BLOOD PRESSURE: 69 MMHG | HEART RATE: 77 BPM | SYSTOLIC BLOOD PRESSURE: 149 MMHG | OXYGEN SATURATION: 97 % | BODY MASS INDEX: 26.54 KG/M2

## 2018-07-16 DIAGNOSIS — H90.3 SENSORINEURAL HEARING LOSS (SNHL) OF BOTH EARS: ICD-10-CM

## 2018-07-16 DIAGNOSIS — H60.392 OTHER INFECTIVE CHRONIC OTITIS EXTERNA OF LEFT EAR: Primary | ICD-10-CM

## 2018-07-16 DIAGNOSIS — H72.92 TYMPANIC MEMBRANE PERFORATION, LEFT: ICD-10-CM

## 2018-07-16 PROCEDURE — 99214 OFFICE O/P EST MOD 30 MIN: CPT | Performed by: OTOLARYNGOLOGY

## 2018-07-16 RX ORDER — CIPROFLOXACIN AND DEXAMETHASONE 3; 1 MG/ML; MG/ML
4 SUSPENSION/ DROPS AURICULAR (OTIC) 2 TIMES DAILY
Qty: 5.6 ML | Refills: 0 | Status: SHIPPED | OUTPATIENT
Start: 2018-07-16 | End: 2018-07-30

## 2018-07-16 ASSESSMENT — PAIN SCALES - GENERAL: PAINLEVEL: NO PAIN (0)

## 2018-07-16 NOTE — MR AVS SNAPSHOT
After Visit Summary   7/16/2018    Luz Thompson    MRN: [id number]           Patient Information     Date Of Birth          1950        Visit Information        Provider Department      7/16/2018 1:45 PM Randell Mejia MD [facility]        Today's Diagnoses     Other infective chronic otitis externa of left ear    -  1    Tympanic membrane perforation, left        Sensorineural hearing loss (SNHL) of both ears           Follow-ups after your visit        Your next 10 appointments already scheduled     Jul 25, 2018  9:30 AM CDT   (Arrive by 9:15 AM)   Return Visit with Crystal Montero MD   [facility] ([facility])    [address]  [address]  [facility] [id number] [phone]            Jul 26, 2018  8:00 PM CDT   PSG Split with SLEEP STUDY  3   [facility], Sleep Study ([facility])    [address] [place] [id number] [phone]            Aug 06, 2018  9:00 AM CDT   (Arrive by 8:45 AM)   RETURN ENDOCRINE with Rach Vasquez MD   [facility] Endocrinology ([facility])    [address]  3rd Floor  [facility] [id number] [phone]            Aug 13, 2018  1:00 PM CDT   Return Sleep Patient with Vero Quiñonez MD   [facility], Sleep Study ([facility])    [address] [place] [id number] [phone]            Aug 15, 2018 11:30 AM CDT   (Arrive by 11:15 AM)   RETURN ATRIAL FIBULATION VISIT with LIZ Sauceda [facility] ([facility] [place])    [address]  [address]  [facility] [address] [phone]            Sep 14, 2018 10:00 AM CDT   CT CHEST W/O CONTRAST with UCCT2   [facility] [place] [facility] ([facility])    [address]  1st Floor  [facility] [id number] [phone]           Please bring any scans or X-rays taken at other hospitals, if similar tests were done. Also bring a list of your medicines, including vitamins, minerals and over-the-counter drugs. It is safest to leave personal items at home.  Be sure to tell your doctor:   If you have any allergies.   If there s any chance you are pregnant.   If you are breastfeeding.  You do not need to do anything special to prepare for this exam.  Please wear loose clothing, such as a sweat suit or jogging clothes. Avoid snaps, zippers and other metal. We may ask you to undress and put on a hospital gown.            Sep 14, 2018 11:00 AM CDT   (Arrive by 10:45 AM)   Return Visit with LIZ Gamboa [facility] ([facility] [place])    [address]  [address]  [facility] [id number] [phone]              Who to contact     If you have questions or need follow up information about today's clinic visit or your schedule please contact [facility] directly at [phone].  Normal or non-critical lab and imaging results will be communicated to you by MyChart, letter or phone within 4 business days after the clinic has received the results. If you do not hear from us within 7 days, please contact the clinic through MyChart or phone. If you have a critical or abnormal lab result, we will notify you by phone as soon as possible.  Submit refill requests through Followap or call your pharmacy and they will forward the refill request to us. Please allow 3 business days for your refill to be completed.          Additional Information About Your Visit        Followap Information     Followap gives you secure access to your electronic health record. If you see a primary care provider, you can also send messages to your care team and make appointments. If you have questions, please call your primary  care clinic.  If you do not have a primary care provider, please call [phone] and they will assist you.        Care EveryWhere ID     This is your Care EveryWhere ID. This could be used by other organizations to access your [facility] medical records  [id number]        Your Vitals Were     Pulse Temperature Respirations Last Period Pulse Oximetry BMI (Body Mass Index)    77 98.6  F (37  C) 18 06/01/1988 (Approximate) 97% 26.54 kg/m2       Blood Pressure from Last 3 Encounters:   07/16/18 149/69   07/13/18 136/78   06/29/18 152/76    Weight from Last 3 Encounters:   07/16/18 78 kg (172 lb)   07/13/18 78.8 kg (173 lb 11.2 oz)   06/29/18 79.4 kg (175 lb)              Today, you had the following     No orders found for display         Today's Medication Changes          These changes are accurate as of 7/16/18  2:10 PM.  If you have any questions, ask your nurse or doctor.               Start taking these medicines.        Dose/Directions    ciprofloxacin-dexamethasone otic suspension   Commonly known as:  CIPRODEX   Used for:  Other infective chronic otitis externa of left ear, Tympanic membrane perforation, left, Sensorineural hearing loss (SNHL) of both ears   Started by:  Randell Mejia MD        Dose:  4 drop   Place 4 drops Into the left ear 2 times daily for 14 days   Quantity:  5.6 mL   Refills:  0            Where to get your medicines      These medications were sent to [facility] [place]  [place], [place] - [address]  [address], [place] [address]     Phone:  [phone]     ciprofloxacin-dexamethasone otic suspension                Primary Care Provider Office Phone # Fax #    Jennifer Amparo Barraza [phone]       [address]  [place] [facility] [place] [address]        Equal Access to Services     CARLA NOWAK AH: Hadii aad ku hadasho Soomaali, waaxda luqadaha, qaybta kaalmada adeegyada, waxay kayla hardy. So [facility]  [phone].    ATENCIÓN: Si andi white, tiene a jason disposición servicios gratuitos de asistencia lingüística. Jonas mendez [phone].    We comply with applicable federal civil rights laws and [place] laws. We do not discriminate on the basis of race, color, national origin, age, disability, sex, sexual orientation, or gender identity.            Thank you!     Thank you for choosing [facility]  for your care. Our goal is always to provide you with excellent care. Hearing back from our patients is one way we can continue to improve our services. Please take a few minutes to complete the written survey that you may receive in the mail after your visit with us. Thank you!             Your Updated Medication List - Protect others around you: Learn how to safely use, store and throw away your medicines at www.disposemymeds.org.          This list is accurate as of 7/16/18  2:10 PM.  Always use your most recent med list.                   Brand Name Dispense Instructions for use Diagnosis    acetaminophen 500 MG Caps      Take 1,000 mg by mouth nightly as needed Take 500-1,000 mg by mouth every 6 hours if needed.        ARTIFICIAL TEARS OP      Apply 1 drop to eye as needed        BENEFIBER Powd      2 teaspoons daily        calcitRIOL 0.5 MCG capsule    ROCALTROL    90 capsule    Take 1 capsule (0.5 mcg) by mouth daily    Postablative hypoparathyroidism (H)       ciprofloxacin-dexamethasone otic suspension    CIPRODEX    5.6 mL    Place 4 drops Into the left ear 2 times daily for 14 days    Other infective chronic otitis externa of left ear, Tympanic membrane perforation, left, Sensorineural hearing loss (SNHL) of both ears       clotrimazole 1 % cream    LOTRIMIN     Apply topically 2 times daily as needed Reported on 4/25/2017        ELIQUIS 2.5 MG tablet   Generic drug:  apixaban ANTICOAGULANT      Take 2.5 mg by mouth 2 times daily        estradiol 0.1 MG/GM cream    ESTRACE VAGINAL    42.5 g     Place 2 g vaginally twice a week    Atrophic vaginitis       ezetimibe 10 MG tablet    ZETIA    30 tablet    Take 10 mg by mouth every evening        folic acid 1 MG tablet    FOLVITE    100 tablet    Take 1 tablet (1 mg) by mouth daily    Liver replaced by transplant (H)       furosemide 20 MG tablet    LASIX    46 tablet    Take 0.5 tablets (10 mg) by mouth daily    CKD (chronic kidney disease) stage 3, GFR 30-59 ml/min, Liver replaced by transplant (H)       hydrALAZINE 25 MG tablet    APRESOLINE    270 tablet    Take 0.5 tablets (12.5 mg) by mouth 3 times daily as needed , if systolic blood pressure is more than 140 mmHg.    HTN (hypertension)       ketoconazole 2 % cream    NIZORAL    15 g    Apply topically 2 times daily To angles of mouth    Angular cheilitis       meclizine 25 MG tablet    ANTIVERT    30 tablet    Take 1 tablet (25 mg) by mouth as needed    Dizziness       metoprolol succinate 50 MG 24 hr tablet    TOPROL-XL    90 tablet    Take 1 tablet (50 mg) by mouth daily    Paroxysmal atrial fibrillation (H)       neomycin-polymyxin-hydrocortisone 3.5-78920-2 otic suspension    CORTISPORIN    10 mL    Place 4 drops Into the left ear 4 times daily    Acute swimmer's ear of left side       nitroFURantoin (macrocrystal-monohydrate) 100 MG capsule    MACROBID    14 capsule    Take 1 capsule (100 mg) by mouth 2 times daily For one week at onset of UTI symptoms    Urinary tract infection without hematuria, site unspecified       omega-3 acid ethyl esters 1 g capsule    LOVAZA    360 capsule    Take 2 capsules (2 g) by mouth 2 times daily    Hypertriglyceridemia       predniSONE 5 MG tablet    DELTASONE    90 tablet    Take 1 tablet (5 mg) by mouth daily    Thyroid cancer (H), Liver replaced by transplant (H)       PRESERVISION AREDS 2 Caps      Take 1 capsule by mouth 2 times daily        sertraline 50 MG tablet    ZOLOFT    90 tablet    Take 1 tablet (50 mg) by mouth daily    Adjustment disorder with  depressed mood       sirolimus 0.5 MG Tabs tablet      Take 0.5 mg by mouth every other day        SUMAtriptan 50 MG tablet    IMITREX    30 tablet    Take 1 tablet (50 mg) by mouth at onset of headache for migraine repeat after 2 hours if needed.    Migraine without aura and without status migrainosus, not intractable       * SYNTHROID 150 MCG tablet   Generic drug:  levothyroxine      Take 225 mcg by mouth on Mondays        * levothyroxine 150 MCG tablet    SYNTHROID    120 tablet    Take 150mcg by mouth daily 6 days a week & 225mcg on Mondays    Malignant neoplasm of thyroid gland (H)       tetrahydrozoline 0.05 % ophthalmic solution      1 drop 3 times daily        triamcinolone 0.1 % cream    KENALOG     Apply topically 2 times daily as needed for irritation        UNABLE TO FIND      MEDICATION NAME: prescription eye drops  For pink eye (second eye drop prescribed)        ursodiol 250 MG tablet    ACTIGALL    180 tablet    Take 1 tablet (250 mg) by mouth 2 times daily    Liver replaced by transplant (H)       voriconazole 200 MG tablet    VFEND    60 tablet    Take 1 tablet (200 mg) by mouth 2 times daily    Coccidioidal pneumonitis (H)       * Notice:  This list has 2 medication(s) that are the same as other medications prescribed for you. Read the directions carefully, and ask your doctor or other care provider to review them with you.

## 2018-07-16 NOTE — LETTER
7/16/2018         RE: Luz Thompson  [address]  [facility] [address]        Dear Colleague,    Thank you for referring your patient, Luz Thompson, to the [facility]. Please see a copy of my visit note below.    History of Present Illness - Luz Thompson is a 68 year old female here in close follow up from 6/29/2018, to check on the resolution of a LEFT otitis externa and otitis media, with a perforation.    To review, she has had LEFT ear surgery with a tympanoplasty in 1996.  Otherwise no chronic ear disease.  She has also had thyroid surgery and ablation in March 2010.  The main reason she is here is progressive bilateral ear fullness and blockage of her hearing aids with cerumen.    At the March 2014 visit, she had been through quite an ordeal, having been hospitalized for E. Coli sepsis, the source was unknown. No new issues with that problem since then. And after asking her about it, it happened again this past June of 2015. Her past year had been fine, and other than fullness from her cerumen build up, no new ENT issues. No drainage from the ears, no bleeding, no vertigo. She still uses bilateral hearing aids.    About one week prior to the 6/29/18 visit, she noted some drainage from the LEFT ear.  She went to urgent care and was placed on some antibiotics.  Of note, she also got some pink eye.  However, she was not given drops because of a concern for drug interaction.  However, she was then placed on oral antibiotics.  On exam, the LEFT ear was completely filled with purulence, and cellulitic changes were starting in the external ear soft tissues and skin.  I treated her with debridement, ciprodex, and continued to the oral antibiotics.  Also, on exam after debridement I found a 10% perforation of the LEFT tympanic membrane.    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     Prophylactic antibiotic     High risk medication use     Statin intolerance     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     Coccidioidal pneumonitis (H)     SNHL (sensorineural hearing loss)     Abnormal liver function tests     Diagnostic skin and sensitization tests     Perforation bowel (H)     Liver replaced by transplant (H)     Adjustment disorder with depressed mood     Type 2 diabetes, HbA1c goal < 7% (H)     Hyperlipidemia LDL goal <70     Hypertension goal BP (blood pressure) < 140/80       Current Medications -   Current Outpatient Prescriptions:      acetaminophen 500 MG CAPS, Take 1,000 mg by mouth nightly as needed Take 500-1,000 mg by mouth every 6 hours if needed. , Disp: , Rfl:      calcitRIOL (ROCALTROL) 0.5 MCG capsule, Take 1 capsule (0.5 mcg) by mouth daily, Disp: 90 capsule, Rfl: 0     clotrimazole (LOTRIMIN) 1 % cream, Apply topically 2 times daily as needed Reported on 4/25/2017, Disp: , Rfl:      ELIQUIS 2.5 MG tablet, Take 2.5 mg by mouth 2 times daily , Disp: , Rfl:      estradiol (ESTRACE VAGINAL) 0.1 MG/GM cream, Place 2 g vaginally twice a week, Disp: 42.5 g, Rfl: 11     ezetimibe (ZETIA) 10 MG tablet, Take 10 mg by mouth every evening , Disp: 30 tablet, Rfl: 0     folic acid (FOLVITE) 1 MG tablet, Take 1 tablet (1 mg) by mouth daily, Disp: 100 tablet, Rfl: 3     furosemide (LASIX) 20 MG tablet, Take 0.5 tablets (10 mg) by mouth daily, Disp: 46 tablet, Rfl: 3     hydrALAZINE (APRESOLINE) 25 MG tablet, Take 0.5 tablets (12.5 mg) by mouth 3 times daily as needed , if systolic blood pressure is more than 140 mmHg., Disp: 270 tablet, Rfl: 3     Hypromellose (ARTIFICIAL TEARS OP), Apply 1 drop to eye as needed, Disp: , Rfl:      ketoconazole (NIZORAL) 2 % cream, Apply topically 2 times daily To angles of mouth, Disp: 15 g, Rfl: 0     levothyroxine  (SYNTHROID) 150 MCG tablet, Take 150mcg by mouth daily 6 days a week & 225mcg on Mondays, Disp: 120 tablet, Rfl: 0     levothyroxine (SYNTHROID) 150 MCG tablet, Take 225 mcg by mouth on Mondays, Disp: , Rfl:      meclizine (ANTIVERT) 25 MG tablet, Take 1 tablet (25 mg) by mouth as needed, Disp: 30 tablet, Rfl: 11     metoprolol (TOPROL-XL) 50 MG 24 hr tablet, Take 1 tablet (50 mg) by mouth daily, Disp: 90 tablet, Rfl: 3     Multiple Vitamins-Minerals (PRESERVISION AREDS 2) CAPS, Take 1 capsule by mouth 2 times daily, Disp: , Rfl:      neomycin-polymyxin-hydrocortisone (CORTISPORIN) 3.5-16230-9 otic suspension, Place 4 drops Into the left ear 4 times daily, Disp: 10 mL, Rfl: 0     nitroFURantoin, macrocrystal-monohydrate, (MACROBID) 100 MG capsule, Take 1 capsule (100 mg) by mouth 2 times daily For one week at onset of UTI symptoms, Disp: 14 capsule, Rfl: 6     omega-3 acid ethyl esters (LOVAZA) 1 g capsule, Take 2 capsules (2 g) by mouth 2 times daily, Disp: 360 capsule, Rfl: 3     predniSONE (DELTASONE) 5 MG tablet, Take 1 tablet (5 mg) by mouth daily, Disp: 90 tablet, Rfl: 3     RAPAMUNE (BRAND) 0.5 MG PO TABLET, Take 0.5 mg by mouth every other day, Disp: , Rfl:      sertraline (ZOLOFT) 50 MG tablet, Take 1 tablet (50 mg) by mouth daily, Disp: 90 tablet, Rfl: 3     SUMAtriptan (IMITREX) 50 MG tablet, Take 1 tablet (50 mg) by mouth at onset of headache for migraine repeat after 2 hours if needed., Disp: 30 tablet, Rfl: 3     tetrahydrozoline 0.05 % ophthalmic solution, 1 drop 3 times daily, Disp: , Rfl:      triamcinolone (KENALOG) 0.1 % cream, Apply topically 2 times daily as needed for irritation, Disp: , Rfl:      UNABLE TO FIND, MEDICATION NAME: prescription eye drops  For pink eye (second eye drop prescribed), Disp: , Rfl:      ursodiol (ACTIGALL) 250 MG tablet, Take 1 tablet (250 mg) by mouth 2 times daily, Disp: 180 tablet, Rfl: 3     voriconazole (VFEND) 200 MG tablet, Take 1 tablet (200 mg) by mouth 2  "times daily, Disp: 60 tablet, Rfl: 2     Wheat Dextrin (BENEFIBER) POWD, 2 teaspoons daily, Disp: , Rfl:     Allergies -   Allergies   Allergen Reactions     Fluconazole Hives and Itching     Ciprofloxacin Anxiety and Other (See Comments)     Irregular heart beat     Azithromycin Itching     Benadryl [Diphenhydramine Hcl]      Insomnia      Cellcept Diarrhea     Ciprofloxacin Other (See Comments)     Insomnia, mood lability     Codeine      Psych disturbance     Codeine      Diphenhydramine Other (See Comments)     Doxycycline      Lansoprazole Diarrhea     Levaquin [Levofloxacin] Other (See Comments)     Headache, hyperactivity     Lisinopril Cough     Methotrexate      Sores     Methotrexate      Morphine Sulfate Itching     Mycophenolate Diarrhea     No Clinical Screening - See Comments      Simvastatin Muscle Pain (Myalgia)     severe     Cephalexin Rash     Fever and skin burning     Penicillin G Itching and Rash     Tolectin [Nsaids] Rash     Tramadol Rash       Social History -   Social History     Social History     Marital status: Legally      Spouse name: Robbin Thompson     Number of children: 4     Years of education: 20     Occupational History     [employer]     social work      Self     Social History Main Topics     Smoking status: Former Smoker     Packs/day: 1.00     Years: 18.00     Types: Cigarettes     Quit date: 4/12/1985     Smokeless tobacco: Never Used     Alcohol use 0.0 oz/week     0 Standard drinks or equivalent per week      Comment: rare - \"I toast at weddings\"     Drug use: No     Sexual activity: Yes     Partners: Male     Birth control/ protection: Post-menopausal     Other Topics Concern     Exercise Yes     cardio and strengthing     Social History Narrative    She is retired. She and her  travel around the [place] in an RV. [place] the course of the [place]. She has lived " on a farm for 8 years in the 1970s with hogs, cows, corn and soybean crops.       Family History -   Family History   Problem Relation Age of Onset     Hypertension Mother      Endometrial Cancer Mother      Hyperlipidemia Mother      Prostate Cancer Father      Macular Degeneration Father      Cancer - colorectal Maternal Grandmother      in her 80's, has surgery and removal of part of kidney,  at age 98     HEART DISEASE Maternal Grandfather       at 98     Glaucoma Maternal Grandfather      Cerebrovascular Disease Paternal Grandmother      in her 80's     Hypertension Paternal Grandmother      HEART DISEASE Paternal Grandfather      MI     Alzheimer Disease Paternal Grandfather      Allergies Son      Neurologic Disorder Daughter      Migraines     Breast Cancer Other      Anesthesia Reaction No family hx of      Crohn Disease No family hx of      Ulcerative Colitis No family hx of        Review of Systems - As per HPI and PMHx, otherwise 10+ system review of the head and neck, and general constitution is negative.    Physical Exam  B/P: Data Unavailable, T: Data Unavailable, P: Data Unavailable, R: Data Unavailable  Vitals: LMP 1988 (Approximate)  BMI= There is no height or weight on file to calculate BMI.    General - The patient is well nourished and well developed, and appears to have good nutritional status.  Alert and oriented to person and place, answers questions and cooperates with examination appropriately.   Head and Face - Normocephalic and atraumatic, with no gross asymmetry noted of the contour of the facial features.  The facial nerve is intact, with strong symmetric movements.  Voice and Breathing - The patient was breathing comfortably without the use of accessory muscles. There was no wheezing, stridor, or stertor.  The patients voice was clear and strong, and had appropriate pitch and quality.  Ears - The RIGHT ear and RIGHT tympanic membrane are healthy and normal.  The LEFT ear  canal is dramatically better, clear of any effusion but the LEFT tympanic membrane still has a creamy coating of moisture and is very edematous.  The 10% inferior posterior perforation is still there.  Eyes - Extraocular movements intact, and the pupils were reactive to light.  Sclera were not icteric or injected, conjunctiva were pink and moist.  Mouth - Examination of the oral cavity showed pink, healthy oral mucosa. No lesions or ulcerations noted.  The tongue was mobile and midline, and the dentition were in good condition.    Throat - The walls of the oropharynx were smooth, pink, moist, symmetric, and had no lesions or ulcerations.  The tonsillar pillars and soft palate were symmetric.  The uvula was midline on elevation.    Neck - Normal midline excursion of the laryngotracheal complex during swallowing.  Full range of motion on passive movement.  Palpation of the occipital, submental, submandibular, internal jugular chain, and supraclavicular nodes did not demonstrate any abnormal lymph nodes or masses.  The carotid pulse was palpable bilaterally.        A/P - Luz Thompson is a 68 year old female  (H60.392) Other infective chronic otitis externa of left ear  (primary encounter diagnosis)  (H72.92) Tympanic membrane perforation, left  (H90.3) Sensorineural hearing loss (SNHL) of both ears    The LEFT ear still has some residual otitis externa and therefore it is no surprise that the LEFT tympanic membrane perforation has not healed.      I will renew just the ciprodex and have another close follow up with her in 2-3 weeks.  We have discussed the options if the perforation does not clear, but it will be complicated because she leaves for [place] for the winter at the end of September.    Again, thank you for allowing me to participate in the care of your patient.        Sincerely,        Randell Mejia MD

## 2018-07-16 NOTE — PROGRESS NOTES
History of Present Illness - Luz Thompson is a 68 year old female here in close follow up from 6/29/2018, to check on the resolution of a LEFT otitis externa and otitis media, with a perforation.    To review, she has had LEFT ear surgery with a tympanoplasty in 1996.  Otherwise no chronic ear disease.  She has also had thyroid surgery and ablation in March 2010.  The main reason she is here is progressive bilateral ear fullness and blockage of her hearing aids with cerumen.    At the March 2014 visit, she had been through quite an ordeal, having been hospitalized for E. Coli sepsis, the source was unknown. No new issues with that problem since then. And after asking her about it, it happened again this past June of 2015. Her past year had been fine, and other than fullness from her cerumen build up, no new ENT issues. No drainage from the ears, no bleeding, no vertigo. She still uses bilateral hearing aids.    About one week prior to the 6/29/18 visit, she noted some drainage from the LEFT ear.  She went to urgent care and was placed on some antibiotics.  Of note, she also got some pink eye.  However, she was not given drops because of a concern for drug interaction.  However, she was then placed on oral antibiotics.  On exam, the LEFT ear was completely filled with purulence, and cellulitic changes were starting in the external ear soft tissues and skin.  I treated her with debridement, ciprodex, and continued to the oral antibiotics.  Also, on exam after debridement I found a 10% perforation of the LEFT tympanic membrane.    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     Prophylactic antibiotic     High risk medication use     Statin intolerance     EBV (Waqas-Barr virus)  viremia     Coccidioidal pneumonitis (H)     SNHL (sensorineural hearing loss)     Abnormal liver function tests     Diagnostic skin and sensitization tests     Perforation bowel (H)     Liver replaced by transplant (H)     Adjustment disorder with depressed mood     Type 2 diabetes, HbA1c goal < 7% (H)     Hyperlipidemia LDL goal <70     Hypertension goal BP (blood pressure) < 140/80       Current Medications -   Current Outpatient Prescriptions:      acetaminophen 500 MG CAPS, Take 1,000 mg by mouth nightly as needed Take 500-1,000 mg by mouth every 6 hours if needed. , Disp: , Rfl:      calcitRIOL (ROCALTROL) 0.5 MCG capsule, Take 1 capsule (0.5 mcg) by mouth daily, Disp: 90 capsule, Rfl: 0     clotrimazole (LOTRIMIN) 1 % cream, Apply topically 2 times daily as needed Reported on 4/25/2017, Disp: , Rfl:      ELIQUIS 2.5 MG tablet, Take 2.5 mg by mouth 2 times daily , Disp: , Rfl:      estradiol (ESTRACE VAGINAL) 0.1 MG/GM cream, Place 2 g vaginally twice a week, Disp: 42.5 g, Rfl: 11     ezetimibe (ZETIA) 10 MG tablet, Take 10 mg by mouth every evening , Disp: 30 tablet, Rfl: 0     folic acid (FOLVITE) 1 MG tablet, Take 1 tablet (1 mg) by mouth daily, Disp: 100 tablet, Rfl: 3     furosemide (LASIX) 20 MG tablet, Take 0.5 tablets (10 mg) by mouth daily, Disp: 46 tablet, Rfl: 3     hydrALAZINE (APRESOLINE) 25 MG tablet, Take 0.5 tablets (12.5 mg) by mouth 3 times daily as needed , if systolic blood pressure is more than 140 mmHg., Disp: 270 tablet, Rfl: 3     Hypromellose (ARTIFICIAL TEARS OP), Apply 1 drop to eye as needed, Disp: , Rfl:      ketoconazole (NIZORAL) 2 % cream, Apply topically 2 times daily To angles of mouth, Disp: 15 g, Rfl: 0     levothyroxine (SYNTHROID) 150 MCG tablet, Take 150mcg by mouth daily 6 days a week & 225mcg on Mondays, Disp: 120 tablet, Rfl: 0     levothyroxine (SYNTHROID) 150 MCG tablet, Take 225 mcg by mouth on Mondays, Disp: , Rfl:      meclizine (ANTIVERT) 25 MG tablet, Take 1  tablet (25 mg) by mouth as needed, Disp: 30 tablet, Rfl: 11     metoprolol (TOPROL-XL) 50 MG 24 hr tablet, Take 1 tablet (50 mg) by mouth daily, Disp: 90 tablet, Rfl: 3     Multiple Vitamins-Minerals (PRESERVISION AREDS 2) CAPS, Take 1 capsule by mouth 2 times daily, Disp: , Rfl:      neomycin-polymyxin-hydrocortisone (CORTISPORIN) 3.5-44534-9 otic suspension, Place 4 drops Into the left ear 4 times daily, Disp: 10 mL, Rfl: 0     nitroFURantoin, macrocrystal-monohydrate, (MACROBID) 100 MG capsule, Take 1 capsule (100 mg) by mouth 2 times daily For one week at onset of UTI symptoms, Disp: 14 capsule, Rfl: 6     omega-3 acid ethyl esters (LOVAZA) 1 g capsule, Take 2 capsules (2 g) by mouth 2 times daily, Disp: 360 capsule, Rfl: 3     predniSONE (DELTASONE) 5 MG tablet, Take 1 tablet (5 mg) by mouth daily, Disp: 90 tablet, Rfl: 3     RAPAMUNE (BRAND) 0.5 MG PO TABLET, Take 0.5 mg by mouth every other day, Disp: , Rfl:      sertraline (ZOLOFT) 50 MG tablet, Take 1 tablet (50 mg) by mouth daily, Disp: 90 tablet, Rfl: 3     SUMAtriptan (IMITREX) 50 MG tablet, Take 1 tablet (50 mg) by mouth at onset of headache for migraine repeat after 2 hours if needed., Disp: 30 tablet, Rfl: 3     tetrahydrozoline 0.05 % ophthalmic solution, 1 drop 3 times daily, Disp: , Rfl:      triamcinolone (KENALOG) 0.1 % cream, Apply topically 2 times daily as needed for irritation, Disp: , Rfl:      UNABLE TO FIND, MEDICATION NAME: prescription eye drops  For pink eye (second eye drop prescribed), Disp: , Rfl:      ursodiol (ACTIGALL) 250 MG tablet, Take 1 tablet (250 mg) by mouth 2 times daily, Disp: 180 tablet, Rfl: 3     voriconazole (VFEND) 200 MG tablet, Take 1 tablet (200 mg) by mouth 2 times daily, Disp: 60 tablet, Rfl: 2     Wheat Dextrin (BENEFIBER) POWD, 2 teaspoons daily, Disp: , Rfl:     Allergies -   Allergies   Allergen Reactions     Fluconazole Hives and Itching     Ciprofloxacin Anxiety and Other (See Comments)     Irregular heart  "beat     Azithromycin Itching     Benadryl [Diphenhydramine Hcl]      Insomnia      Cellcept Diarrhea     Ciprofloxacin Other (See Comments)     Insomnia, mood lability     Codeine      Psych disturbance     Codeine      Diphenhydramine Other (See Comments)     Doxycycline      Lansoprazole Diarrhea     Levaquin [Levofloxacin] Other (See Comments)     Headache, hyperactivity     Lisinopril Cough     Methotrexate      Sores     Methotrexate      Morphine Sulfate Itching     Mycophenolate Diarrhea     No Clinical Screening - See Comments      Simvastatin Muscle Pain (Myalgia)     severe     Cephalexin Rash     Fever and skin burning     Penicillin G Itching and Rash     Tolectin [Nsaids] Rash     Tramadol Rash       Social History -   Social History     Social History     Marital status: Legally      Spouse name: Robbin Thompson     Number of children: 4     Years of education: 20     Occupational History     [employer]     social work      Self     Social History Main Topics     Smoking status: Former Smoker     Packs/day: 1.00     Years: 18.00     Types: Cigarettes     Quit date: 4/12/1985     Smokeless tobacco: Never Used     Alcohol use 0.0 oz/week     0 Standard drinks or equivalent per week      Comment: rare - \"I toast at weddings\"     Drug use: No     Sexual activity: Yes     Partners: Male     Birth control/ protection: Post-menopausal     Other Topics Concern     Exercise Yes     cardio and strengthing     Social History Narrative    She is retired. She and her  travel around the [place] in an RV. [place] in [place] the course of the winter. 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       Family History -   Family History   Problem Relation Age of Onset     Hypertension Mother      Endometrial Cancer Mother      Hyperlipidemia Mother      Prostate Cancer Father "      Macular Degeneration Father      Cancer - colorectal Maternal Grandmother      in her 80's, has surgery and removal of part of kidney,  at age 98     HEART DISEASE Maternal Grandfather       at 98     Glaucoma Maternal Grandfather      Cerebrovascular Disease Paternal Grandmother      in her 80's     Hypertension Paternal Grandmother      HEART DISEASE Paternal Grandfather      MI     Alzheimer Disease Paternal Grandfather      Allergies Son      Neurologic Disorder Daughter      Migraines     Breast Cancer Other      Anesthesia Reaction No family hx of      Crohn Disease No family hx of      Ulcerative Colitis No family hx of        Review of Systems - As per HPI and PMHx, otherwise 10+ system review of the head and neck, and general constitution is negative.    Physical Exam  B/P: Data Unavailable, T: Data Unavailable, P: Data Unavailable, R: Data Unavailable  Vitals: LMP 1988 (Approximate)  BMI= There is no height or weight on file to calculate BMI.    General - The patient is well nourished and well developed, and appears to have good nutritional status.  Alert and oriented to person and place, answers questions and cooperates with examination appropriately.   Head and Face - Normocephalic and atraumatic, with no gross asymmetry noted of the contour of the facial features.  The facial nerve is intact, with strong symmetric movements.  Voice and Breathing - The patient was breathing comfortably without the use of accessory muscles. There was no wheezing, stridor, or stertor.  The patients voice was clear and strong, and had appropriate pitch and quality.  Ears - The RIGHT ear and RIGHT tympanic membrane are healthy and normal.  The LEFT ear canal is dramatically better, clear of any effusion but the LEFT tympanic membrane still has a creamy coating of moisture and is very edematous.  The 10% inferior posterior perforation is still there.  Eyes - Extraocular movements intact, and the pupils were  reactive to light.  Sclera were not icteric or injected, conjunctiva were pink and moist.  Mouth - Examination of the oral cavity showed pink, healthy oral mucosa. No lesions or ulcerations noted.  The tongue was mobile and midline, and the dentition were in good condition.    Throat - The walls of the oropharynx were smooth, pink, moist, symmetric, and had no lesions or ulcerations.  The tonsillar pillars and soft palate were symmetric.  The uvula was midline on elevation.    Neck - Normal midline excursion of the laryngotracheal complex during swallowing.  Full range of motion on passive movement.  Palpation of the occipital, submental, submandibular, internal jugular chain, and supraclavicular nodes did not demonstrate any abnormal lymph nodes or masses.  The carotid pulse was palpable bilaterally.        A/P - Luz Thompson is a 68 year old female  (H60.392) Other infective chronic otitis externa of left ear  (primary encounter diagnosis)  (H72.92) Tympanic membrane perforation, left  (H90.3) Sensorineural hearing loss (SNHL) of both ears    The LEFT ear still has some residual otitis externa and therefore it is no surprise that the LEFT tympanic membrane perforation has not healed.      I will renew just the ciprodex and have another close follow up with her in 2-3 weeks.  We have discussed the options if the perforation does not clear, but it will be complicated because she leaves for [place] for the winter at the end of September.

## 2018-07-23 DIAGNOSIS — E78.1 HYPERTRIGLYCERIDEMIA: ICD-10-CM

## 2018-07-23 RX ORDER — OMEGA-3-ACID ETHYL ESTERS 1 G/1
1 CAPSULE, LIQUID FILLED ORAL 2 TIMES DAILY
Qty: 360 CAPSULE | Refills: 3 | COMMUNITY
Start: 2018-07-23 | End: 2019-08-07

## 2018-07-25 ENCOUNTER — OFFICE VISIT (OUTPATIENT)
Dept: INFECTIOUS DISEASES | Facility: CLINIC | Age: 68
End: 2018-07-25
Attending: INTERNAL MEDICINE
Payer: MEDICARE

## 2018-07-25 VITALS
DIASTOLIC BLOOD PRESSURE: 77 MMHG | WEIGHT: 172.3 LBS | HEART RATE: 72 BPM | HEIGHT: 68 IN | SYSTOLIC BLOOD PRESSURE: 134 MMHG | BODY MASS INDEX: 26.11 KG/M2 | TEMPERATURE: 97.8 F | OXYGEN SATURATION: 99 %

## 2018-07-25 DIAGNOSIS — N18.30 TYPE 2 DIABETES MELLITUS WITH STAGE 3 CHRONIC KIDNEY DISEASE, WITHOUT LONG-TERM CURRENT USE OF INSULIN (H): ICD-10-CM

## 2018-07-25 DIAGNOSIS — Z22.39 VRE CARRIER: ICD-10-CM

## 2018-07-25 DIAGNOSIS — E89.0 POSTOPERATIVE HYPOTHYROIDISM: ICD-10-CM

## 2018-07-25 DIAGNOSIS — Z94.4 LIVER REPLACED BY TRANSPLANT (H): ICD-10-CM

## 2018-07-25 DIAGNOSIS — E11.22 TYPE 2 DIABETES MELLITUS WITH STAGE 3 CHRONIC KIDNEY DISEASE, WITHOUT LONG-TERM CURRENT USE OF INSULIN (H): ICD-10-CM

## 2018-07-25 DIAGNOSIS — B27.00 EBV (EPSTEIN-BARR VIRUS) VIREMIA: ICD-10-CM

## 2018-07-25 DIAGNOSIS — Z79.899 HIGH RISK MEDICATION USE: ICD-10-CM

## 2018-07-25 DIAGNOSIS — B38.2 COCCIDIOIDAL PNEUMONITIS (H): Primary | ICD-10-CM

## 2018-07-25 PROCEDURE — G0463 HOSPITAL OUTPT CLINIC VISIT: HCPCS | Mod: ZF

## 2018-07-25 ASSESSMENT — PAIN SCALES - GENERAL: PAINLEVEL: NO PAIN (0)

## 2018-07-25 NOTE — PROGRESS NOTES
[facility]  Transplant Infectious Disease Clinic Note     Patient:  Luz Thompson, Date of birth 1950, Medical record number [id number]  Date of Visit:  07/25/2018         Assessment and Recommendations:   Recommendations:  - Continue voriconazole. Prior auth for vori refills will be completed as needed.   - Transplant labs next week to include inflammatory markers, liver testing and voriconazole level. Orders placed.   - Follow-up Chest CT scheduled for 9/14/2018.   - Return to clinic upon [place], ~ 8 months from now.    Assessment: Virginia is a 68 year old woman immunosuppressed s/p 2002 liver transplant with recurrent gram negative sepsis. She has known sigmoid diverticulosis. She gets episodes of defecation syncope.   ID issues:  - Coccidioides infection. Followup CT imaging 6/27/2018 shows no worsening of the nodularities over a 1-yr interval; we reviewed this scan together in clinic.  She continues on vori for cocci, and she's had some dry skin and areas of frequent picking of skin since she started vori. Continue voriconazole. Prior auth for vori refills will be completed as needed. Transplant labs next week to include inflammatory markers, liver testing and voriconazole level. Follow-up Chest CT scheduled for 9/14/2018.   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 Will check EBV level with labs on 7/30/2018.  - Left otitis. On ciprodex drops. Followed in ENT clinic due to perforation.   - Self-triggered use of prn antibiotics due to recurrent episodes of sepsis. She gets a lot of itching with vantin, and quinolones and macrolides don't seem to work that well for her, so her current prn med is bactrim. If the bactrim does not do the job in controlling fever, because she has allergies to penicillins and quinolones (cipro and levaquin), she knows to be seen somewhere where she could be  evaluated for a once daily infusion of ertapenem (also called Invanz).   - Hx of bacterial superinfection of chronic venous stasis changes on the legs, 7/27/2016.  - Hx of recurrent E coli sepsis 8/13/2013, 6/10/2015.   - Hx of Klebsiella UTI, 7/18/16. She completed a 14-day course of macrobid.  - Hx of Haemophilus influenzae pneumonia in 9/2014.  - Hx of angular chelitis, hx of thrush extending from under her upper denture plate, and onychomycosis.   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   - QTc interval 5/18/2018 457 msec.   - PCP prophylaxis: Not needed, as most recent CD4 count was > 200.   - Serostatus: CMV seronegative, EBV seropositive on 8/15/13.  - Immunization status: Completed PCV13 and PPSV23 x2 with last dose in 5/2016.  - Gamma globulin status: Endogenously replete.  - Isolation status: Good hand hygience. When she is an inpatient, she needs to be in contact isolation for known VRE carriage.    Crystal Montero MD. 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 She has venous stasis changes of her legs, and she can get stasis dermatitis if she does not wear her compression stockings. This is especially problematic if the weather is very warm and for that reason she does not want to wear compression stockings. She received Rituxan therapy for EBV viremia on 8/31/2016. She had some side effects in the days following the dose of Rituxan, but otherwise would be able to tolerate it again in the future. In the 5689-2730 winter season, she was diagnosed with coccidioidomycosis. There was a strong pulmonary component to the infection, and she is being treated with voriconazole.     Since Virginia was last seen in ID clinic 8/16/2017, she is taking less tylenol for aches and pains, as she is getting into better. She continues on vori for cocci, and she's had some dry skin  "and areas of frequent picking of skin since she started vori. F/u Chest CT 6/27/2018 shows no worsening of the nodularities over a 1-yr interval; we reviewed this scan together in clinic. She is working with a  to try to get in better physical condition. She is following closely with ENT for frequent ear infections, that has included L TM perforation; she is treated with ciprodex drops. She is happy to report that her ears are improved to the point where she can wear both hearing aids again. Her abd is better since she had cut her dose of omega 3 in half. She is  from her  of 39 years. She has started living in an apt in [place], and [place].     Transplants:  5/22/2002 (Liver), Postoperative day:  5908         Review of Systems:   CONSTITUTIONAL:  No fever, chills, night sweats.  EYES: negative for icterus. Vision good with treatment of cataract and glaucoma issues.   ENT:  She wears hearing aids. She takes her dentures out at night. No sore throat.   RESPIRATORY:  No cough, no sputum, but sometimes she has dyspnea.   CARDIOVASCULAR:  negative for chest pain, no heart palpitations  GASTROINTESTINAL:  negative for nausea, vomiting, diarrhea, constipation.  GENITOURINARY:  No dysuria or hematuria  HEME:  Normal amount of easy bruising, + easy bleeding.   INTEGUMENT:  She has her normal amount of itching from dry skin. No new rash.   NEURO:  No headache. No tremor.     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      Diverticulosis of sigmoid colon 12/21/2013 "     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     Received Rituxan during Summer of 2016     H/O esophageal varices      Hearing loss      Heart murmur 4/4/2012     History of DVT (deep vein thrombosis)      History of [place] fever      History of thyroid cancer 9/25/2012     Hyperlipidemia 4/10/2012    Says that she does not have it anymore, not on meds     Hyperlipidemia LDL goal <70      Hypertension goal BP (blood pressure) < 140/80 11/06/2013     Hypertriglyceridemia      Liver replaced by transplant (H) 10/17/2011    Dr. 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       Past Surgical History:   Procedure Laterality Date     APPENDECTOMY  1961     BIOPSY       CATARACT IOL, RT/LT      RE12/19/2013, LE12/10/2013 - Toric lenses     CHOLECYSTECTOMY  1991     COLONOSCOPY  3/10/2014    Procedure: COLONOSCOPY;;  Surgeon: Gentry Ramirez MD;  Location:  GI     ear drum repair       ENDOBRONCHIAL ULTRASOUND FLEXIBLE N/A 9/29/2017    Procedure: ENDOBRONCHIAL ULTRASOUND FLEXIBLE;  Flexible Bronchoscopy, Endobronchial Ultrasound, Transbronchial Needle Aspiration ;  Surgeon: Eden Clinton MD;  Location: [facility] OR     ENDOSCOPIC RETROGRADE CHOLANGIOPANCREATOGRAM  9/19/2013    Procedure: ENDOSCOPIC RETROGRADE CHOLANGIOPANCREATOGRAM;  Endoscopic Retrograde Cholangiopancreatogram with single balloon enteroscopy, ballon sweep of bile duct;  Surgeon: Brett Membreno MD;  Location:  OR      KNEE SCOPE,MED/LAT MENISECTOMY  8/10/12    Right, partial medial menisectomy only     KNEE SURGERY  1966    R  "knee     PICC INSERTION  2013    4fr SL PASV PICC, 40cm (1cm external) in the R basilic vein w/ tip in the low SVC     PICC INSERTION  2014    5 fr DL BioFlo Navilyst PICC, 46 cm (3 cm external) in the L basilic vein w/ tip in the SVC RA junction.     THYROIDECTOMY  3/2010     TRANSPLANT LIVER RECIPIENT LIVING UNRELATED         Family History   Problem Relation Age of Onset     Hypertension Mother      Endometrial Cancer Mother      Hyperlipidemia Mother      Prostate Cancer Father      Macular Degeneration Father      Cancer - colorectal Maternal Grandmother      in her 80's, has surgery and removal of part of kidney,  at age 98     HEART DISEASE Maternal Grandfather       at 98     Glaucoma Maternal Grandfather      Cerebrovascular Disease Paternal Grandmother      in her 80's     Hypertension Paternal Grandmother      HEART DISEASE Paternal Grandfather      MI     Alzheimer Disease Paternal Grandfather      Allergies Son      Neurologic Disorder Daughter      Migraines     Breast Cancer Other      Anesthesia Reaction No family hx of      Crohn Disease No family hx of      Ulcerative Colitis No family hx of        Social History     Social History Narrative    She is retired. She lives in the [facility] [place] the course of the winter. 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     Social History   Substance Use Topics     Smoking status: Former Smoker     Packs/day: 1.00     Years: 18.00     Types: Cigarettes     Quit date: 1985     Smokeless tobacco: Never Used     Alcohol use 0.0 oz/week     0 Standard drinks or equivalent per week      Comment: rare - \"I toast at weddings\"       Immunization History   Administered Date(s) Administered     L4q5-56 Novel Flu P-free 11/10/2009     Influenza (High Dose) 3 valent vaccine 2015, 10/30/2016, 10/23/2017     Influenza (IIV3) PF 11/10/2004, 2007, 10/01/2010, " 09/27/2012, 10/29/2012, 09/30/2013, 09/01/2016     Pneumo Conj 13-V (2010&after) 02/26/2014     Pneumococcal 23 valent 12/01/2007, 05/25/2016     TD (ADULT, 7+) 01/01/2007     TDAP Vaccine (Adacel) 09/17/2007, 02/26/2014       Patient Active Problem List   Diagnosis     Bladder infection, chronic     Osteoporosis     Osteoarthritis of right knee     HDL deficiency     Advanced directives, counseling/discussion     Vitamin D deficiency     S/P tympanoplasty     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     Prophylactic antibiotic     High risk medication use     Statin intolerance     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     Coccidioidal pneumonitis (H)     SNHL (sensorineural hearing loss)     Abnormal liver function tests     Diagnostic skin and sensitization tests     Perforation bowel (H)     Liver replaced by transplant (H)     Adjustment disorder with depressed mood     Type 2 diabetes, HbA1c goal < 7% (H)     Hyperlipidemia LDL goal <70     Hypertension goal BP (blood pressure) < 140/80     Postoperative hypothyroidism       Outpatient Prescriptions Marked as Taking for the 7/25/18 encounter (Office Visit) with Crystal Montero MD   Medication Sig     calcitRIOL (ROCALTROL) 0.5 MCG capsule Take 1 capsule (0.5 mcg) by mouth daily     ciprofloxacin-dexamethasone (CIPRODEX) otic suspension Place 4 drops Into the left ear 2 times daily for 14 days     clotrimazole (LOTRIMIN) 1 % cream Apply topically 2 times daily as needed Reported on 4/25/2017     ELIQUIS 2.5 MG tablet Take 2.5 mg by mouth 2 times daily      estradiol (ESTRACE VAGINAL) 0.1 MG/GM cream Place 2 g vaginally twice a week     ezetimibe (ZETIA) 10 MG tablet Take 10 mg by mouth every evening      folic acid (FOLVITE) 1 MG tablet Take 1 tablet (1 mg) by mouth daily     furosemide (LASIX) 20 MG tablet Take 0.5 tablets (10 mg) by mouth daily     Hypromellose (ARTIFICIAL TEARS OP) Apply 1 drop to eye as needed     levothyroxine (SYNTHROID) 150 MCG tablet Take 150mcg by mouth daily 6  "days a week & 225mcg on Mondays     metoprolol (TOPROL-XL) 50 MG 24 hr tablet Take 1 tablet (50 mg) by mouth daily     Multiple Vitamins-Minerals (PRESERVISION AREDS 2) CAPS Take 1 capsule by mouth 2 times daily     neomycin-polymyxin-hydrocortisone (CORTISPORIN) 3.5-42507-0 otic suspension Place 4 drops Into the left ear 4 times daily     omega-3 acid ethyl esters (LOVAZA) 1 g capsule Take 1 capsule (1 g) by mouth 2 times daily     predniSONE (DELTASONE) 5 MG tablet Take 1 tablet (5 mg) by mouth daily     RAPAMUNE (BRAND) 0.5 MG PO TABLET Take 0.5 mg by mouth every other day     sertraline (ZOLOFT) 50 MG tablet Take 1 tablet (50 mg) by mouth daily     ursodiol (ACTIGALL) 250 MG tablet Take 1 tablet (250 mg) by mouth 2 times daily     voriconazole (VFEND) 200 MG tablet Take 1 tablet (200 mg) by mouth 2 times daily     Wheat Dextrin (BENEFIBER) POWD 2 teaspoons daily       Allergies   Allergen Reactions     Fluconazole Hives and Itching     Ciprofloxacin Anxiety and Other (See Comments)     Irregular heart beat     Azithromycin Itching     Benadryl [Diphenhydramine Hcl]      Insomnia      Cellcept Diarrhea     Ciprofloxacin Other (See Comments)     Insomnia, mood lability     Codeine      Psych disturbance     Codeine      Diphenhydramine Other (See Comments)     Doxycycline      Lansoprazole Diarrhea     Levaquin [Levofloxacin] Other (See Comments)     Headache, hyperactivity     Lisinopril Cough     Methotrexate      Sores     Methotrexate      Morphine Sulfate Itching     Mycophenolate Diarrhea     No Clinical Screening - See Comments      Simvastatin Muscle Pain (Myalgia)     severe     Cephalexin Rash     Fever and skin burning     Penicillin G Itching and Rash     Tolectin [Nsaids] Rash     Tramadol Rash            Physical Exam:   Vitals were reviewed.  All vitals stable.  /77  Pulse 72  Temp 97.8  F (36.6  C)  Ht 1.715 m (5' 7.5\")  Wt 78.2 kg (172 lb 4.8 oz)  LMP 06/01/1988 (Approximate)  SpO2 99% "  BMI 26.59 kg/m2   Wt Readings from Last 4 Encounters:   07/25/18 78.2 kg (172 lb 4.8 oz)   07/16/18 78 kg (172 lb)   07/13/18 78.8 kg (173 lb 11.2 oz)   06/29/18 79.4 kg (175 lb)       Physical Examination:  GENERAL:  well-developed, well-nourished woman, in no acute distress.  HEENT:  Head is normocephalic, atraumatic   EYES:  Eyes have anicteric sclerae.   ENT: Hearing aids in place today. Dentures in place, no thrush. Mild angular cheilitis.   NECK:  Supple.   LUNGS: Clear to auscultation bilateral.   CARDIOVASCULAR: Regular rate and rhythm with 2/6 systolic murmur heard best at RUSB but no gallops or rubs.   ABDOMEN: Normal bowel sounds, not tender. Subcostal surgical scar not otherwise examined.  SKIN:  No acute rash. She is wearing compression stockings. Onychomycosis not examined today.   NEUROLOGIC:  Grossly nonfocal. Active x4 extremities         Laboratory Data:     Absolute CD4   Date Value Ref Range Status   08/19/2014 415 mm3 Final   09/16/2013 1266 mm3 Final   09/15/2013 DUPLICATE,TESTING DONE ON SPECIMEN FROM 9.16.13 mm3 Final   11/13/2008 1332 mm3 Final       Inflammatory Markers    Recent Labs   Lab Test  09/03/17   0912  09/02/17   0648  09/01/17   0832  05/14/16   0659  05/13/16   0940  09/23/14   0810  08/19/14   1530  07/23/14   1119  06/30/14   0825  05/15/14   1112  05/08/14   0806  03/04/14   1315  02/27/14   1930   SED   --    --    --    --   67*  79*  76*   --   51*  58*  51*  55*  76*   CRP  64.0*  71.0*  51.0*  21.0*  19.0*  6.1  29.8*  13.7*  12.5*  8.0  <5.0  5.5  16.4*       Immune Globulin Studies    Recent Labs   Lab Test  08/19/14   1530  05/21/12   0754   IGG  1220  1070   IGM   --   97   IGE  46   --    IGA   --   85   IGG1  684   --    IGG2  441   --    IGG3  70   --    IGG4  19   --        Metabolic Studies       Recent Labs   Lab Test  05/19/18   0629  05/18/18   0731  05/14/18   1019  04/17/18   0942  10/05/17   0907  09/29/17   0854  09/18/17   0921   05/10/17   0929    05/24/16   0846   05/13/16   0940   07/23/14   1119   NA  136  137  133  138  139   --   137   < >  138   < >  135   < >  132*   < >  137   POTASSIUM  4.1  3.6  3.3*  3.9  4.0  4.0  5.0   < >  4.2   < >  3.6   < >  3.6   < >  3.8   CHLORIDE  103  100  100  102  100   --   99   < >  102   < >  99   < >  96   < >  98   CO2  27  27  27  28  28   --   32   < >  26   < >  27   < >  26   < >  27   ANIONGAP  6  10  6  8  11   --   6   < >  10   < >  9   < >  10   < >  12   BUN  20  20  23  36*  33*   --   36*   < >  36*   < >  28   < >  29   < >  35*   CR  1.29*  1.22*  1.40*  1.74*  1.38*   --   1.26*   < >  1.53*   < >  1.77*   < >  1.64*   < >  1.70*   GFRESTIMATED  41*  44*  37*  29*  38*   --   42*   < >  34*   < >  29*   < >  31*   < >  30*   GLC  103*  119*  124*  125*  93   --   108*   < >  104*   < >  94   < >  98   < >  109*   A1C   --   6.8*   --    --    --    --    --    --    --    --    --    --    --    --    --    KEILY  8.5  8.6  9.0  9.1  9.9   --   9.6   < >  9.7   < >  8.8   < >  9.1   < >  9.1   PHOS   --    --    --    --    --    --    --    --   3.1   --   3.2   --    --    < >   --    MAG   --    --    --   2.8*   --    --   2.2   < >  2.8*   < >  2.3   --   2.2   < >   --    LACT   --    --    --    --    --    --    --    --    --    --    --    --   0.8   --   1.7    < > = values in this interval not displayed.       Hepatic Studies    Recent Labs   Lab Test  05/14/18   1019  04/17/18   0942  10/23/17   1024  10/05/17   0907  09/26/17   0916  09/18/17   0921   BILITOTAL  0.2  0.3  0.3  0.4  0.4  0.4   ALKPHOS  240*  211*  222*  212*  229*  248*   ALBUMIN  3.1*  3.3*  3.2*  3.2*  3.2*  3.1*   AST  23  26  23  15  23  58*   ALT  43  42  47  33  60*  150*       Lipid testing    Recent Labs   Lab Test  05/18/18   0731  10/05/17   0907  08/22/17   0905   09/18/15   0954  05/08/14   0806   CHOL  265*  231*  291*   < >  181  159   HDL  49*  50  49*   < >  63  48*   LDL  Cannot estimate LDL when  triglyceride exceeds 400 mg/dL  150*  Cannot estimate LDL when triglyceride exceeds 400 mg/dL  135*  Cannot estimate LDL when triglyceride exceeds 400 mg/dL  174*   < >  84  88   TRIG  557*  419*  447*   < >  172*  112   CHOLHDLRATIO   --    --    --    --   2.9  3.3    < > = values in this interval not displayed.       Gout Labs      Recent Labs   Lab Test  12/31/13   1443  07/30/13   1441   URIC  6.7  6.7       Hematology Studies      Recent Labs   Lab Test  05/19/18   0629  05/18/18   0731  05/14/18   1019  04/17/18   0942  09/18/17   0921  09/07/17   0832   09/02/17   0648  09/01/17   0832  08/31/17   1306   05/16/16   0827   05/14/16   0659   WBC  6.1  8.0  8.6  8.3  11.4*  6.8   < >  10.2  9.4  10.6   < >  5.7   < >  4.7   ANEU   --   5.0   --    --    --    --    --   7.4  6.5  8.6*   --   2.5   --   2.1   ALYM   --   1.9   --    --    --    --    --   1.7  1.7  1.3   --   2.4   --   1.6   KATHY   --   1.0   --    --    --    --    --   0.9  1.0  0.7   --   0.6   --   0.9   AEOS   --   0.1   --    --    --    --    --   0.2  0.1  0.0   --   0.1   --   0.1   HGB  10.3*  11.6*  11.5*  11.9  12.2  11.9   < >  10.5*  11.1*  12.4   < >  11.1*   < >  10.0*   HCT  33.0*  36.0  36.1  37.7  37.2  37.2   < >  34.0*  35.0  38.5   < >  35.4   < >  31.8*   PLT  302  324  326  348  399  333   < >  259  269  290   < >  256   < >  230    < > = values in this interval not displayed.       Iron Testing    Recent Labs   Lab Test  05/19/18   0629   09/13/17   0919   07/11/13   0814   04/18/13   0809  03/21/13   0812 12/22/12   1346   12/20/12 2005 12/07/12   1345   IRON   --    --    --    --   58   --   63  90   < >   --    --    --   48   FEB   --    --    --    --   285   --   315  285   < >   --    --    --   156*   IRONSAT   --    --    --    --   20   --   20  32   < >   --    --    --   30   LAURA   --    --   44   --   195   < >  232  202   < >   --    --    --   317*   MCV  88   < >   --    < >  88   < >  92  98    < >  100   < >  103*   --    B12   --    --    --    --    --    --    --    --    --    --    --    --   281   [id number]   --    --    --    --    --    --    --    --    --    --    --    --   272*   HAPT   --    --    --    --    --    --    --    --    --    --    --   137   --    RETP   --    --    --    --    --    --    --    --    --   2.7*   --    --    --    RETICABSCT   --    --    --    --    --    --    --    --    --   71.0   --    --    --     < > = values in this interval not displayed.       Clotting Studies    Recent Labs   Lab Test  05/18/18   0731  05/16/16   0827  05/13/16   0940  11/06/13   1246   06/19/11   1815   INR  1.06  1.02  1.11  0.96   < >  1.08   PTT  33   --   33   --    --   28    < > = values in this interval not displayed.       Thyroid Studies     Recent Labs   Lab Test  07/13/17   0843  05/10/17   0929  07/18/16   1430  05/24/16   0846  07/20/15   1010   09/23/14   0810   05/08/14   0806   TSH  3.66  4.74*  2.43  3.65  3.41   < >  2.87   < >  4.41   T4  1.59*  1.48*  1.38  1.19  1.24   < >  1.29  9.1   --   1.19  9.1   T3   --    --    --    --    --    --   65   --   48*    < > = values in this interval not displayed.       Urine Studies     Recent Labs   Lab Test  05/02/18   1008  08/31/17   2100  08/22/17   0913  07/18/16   1503  05/13/16   1114   08/19/14   1410   URINEPH  6.0  8.0*  7.0  5.5  6.5   < >  7.0   NITRITE  Neg  Negative  Negative  Negative  Negative   < >  Negative   LEUKEST  Trace  Small*  Trace*  Large*  Moderate*   < >  Negative   WBCU   --   2  2-5*  10-25*  4*   --   <1    < > = values in this interval not displayed.       Medication levels    Recent Labs   Lab Test  04/17/18   0942  10/23/17   1025   09/15/13   0435   12/21/12   1344   VANCOMYCIN   --    --    --   26.3   --    --    CYCLSP   --   Canceled, Test credited   < >   --    --    --    RAPAMY  6.2   --    < >   --    < >   --    MPACID   --    --    --    --    --   5.42*   MPAG   --    --    --     --    --   83.5    < > = values in this interval not displayed.       Microbiology:  Last Culture results with specimen source  Culture Micro   Date Value Ref Range Status   07/18/2016 (A)  Final    50,000 to 100,000 colonies/mL Klebsiella pneumoniae   05/21/2016 No growth  Final   05/21/2016 No growth  Final   05/16/2016 Canceled, Test credited Duplicate request  Final   05/16/2016 Canceled, Test credited Duplicate request  Final   05/16/2016 No growth  Final   05/16/2016 No growth  Final   05/13/2016 No growth after 29 days  Final   05/13/2016   Final    Canceled, Test credited Quantity not sufficient Notification of test cancellation   was given to MATTHEW FROM [facility], HE WILL REORDER AND RECOLLECT     05/13/2016 No growth  Final   05/13/2016 No growth  Final   05/13/2016   Final    10,000 to 50,000 colonies/mL mixed urogenital louann  Susceptibility testing not routinely done     05/13/2016 No growth  Final   09/25/2014 (A)  Final    Normal louann  Moderate growth Haemophilus influenzae Beta lactamase negative     09/03/2014 (A)  Final    Normal louann  Heavy growth Haemophilus influenzae Beta lactamase negative  beta lactamase negative isolates are susceptible to ampicillin,   amoxacillin/clavulanic, levofloxacin and ceftriaxone     08/19/2014 No growth  Final   08/19/2014 No growth  Final   07/24/2014 No growth  Final   07/24/2014 No growth  Final    Specimen Description   Date Value Ref Range Status   07/18/2016 Midstream Urine  Final   05/21/2016 Blood Right Arm  Final   05/21/2016 Blood Left Arm  Final   05/16/2016 Blood  Final   05/16/2016 Blood  Final   05/16/2016 Blood Left Arm  Final   05/16/2016 Blood Right Arm  Final   05/13/2016 Blood Unspecified Site  Final   05/13/2016 Blood Unspecified Site  Final   05/13/2016 Blood Right Arm  Final   05/13/2016 Blood Right Arm  Final   05/13/2016 Midstream Urine  Final   05/13/2016 Nasal  Final   05/13/2016 Blood Venipuncture Collection VPT  Final   09/25/2014  Sputum  Final   09/25/2014 Sputum  Final   09/03/2014 Sputum  Final   09/03/2014 Sputum  Final   08/19/2014 Blood Right Arm  Final        Last check of C difficile  C Diff Toxin B PCR   Date Value Ref Range Status   05/17/2012   Final    Negative: Clostridium difficile target DNA sequences NOT detected, presumed   negative for Clostridium difficile toxin B or the number of bacteria present   may be below the limit of detection for the test.   FDA approved assay performed using [employer] GeneXpert real-time PCR.   A negative result does not exclude actual disease due to Clostridium difficile   and may be due to improper collection, handling and storage of the specimen or   the number of organisms in the specimen is below the detection limit of the   assay.       Virology:  CMV Quantitative   Date Value Ref Range Status   08/19/2014 <100 <100 Copies/mL Final   08/15/2013 <100 <100 Copies/mL Final   11/11/2008 <100 <100 Copies/mL Final     Comment:     No CMV DNA detected.   08/21/2007 <100 <100 Copies/mL Final     Comment:     No CMV DNA detected.   01/10/2007 <100 <100 Copies/mL Final     Comment:     No CMV DNA detected.       EBV DNA Copies/mL   Date Value Ref Range Status   08/22/2017 EBV DNA Not Detected EBVNEG^EBV DNA Not Detected [Copies]/mL Final   04/11/2017 (A) EBVNEG [Copies]/mL Final    <500  EBV DNA Detected below the reportable range of 500 Copies/mL     12/09/2016 1219 (A) EBVNEG [Copies]/mL Final   08/08/2016 07677 (A) EBVNEG [Copies]/mL Final   07/26/2016 05655 (A) EBVNEG [Copies]/mL Final   05/24/2016 79195 (A) EBVNEG [Copies]/mL Final   05/13/2016 67214 (A) EBVNEG [Copies]/mL Final   11/20/2015 43637 (A) EBVNEG [Copies]/mL Final   09/14/2015 85771 (A) EBVNEG [Copies]/mL Final   07/20/2015 54252 (A) EBVNEG [Copies]/mL Final   09/15/2013 <1000 <1000 Copies/mL Final   08/15/2013 <1000 <1000 Copies/mL Final   11/12/2008 <1000 <1000 Copies/mL Final       BK viral loads   Recent Labs   Lab Test  07/28/11    1150   BKSPEC  Urine   BKRES  <1000       Imaging   CT CHEST W/O CONTRAST 6/27/2018 3:15 PM  Comparison: CT chest 3/27/2018, 7/31/2017, 4/24/2017; PET CT 8/8/2017   Findings: Chest: Heart size within normal limits. No pericardial effusion. The  thoracic aorta and pulmonary artery are normal caliber. No enlarged  thoracic lymph nodes by CT short axis size criteria.  Central airways are patent. No pleural effusion or pneumothorax.  Innumerable calcified granulomas, mostly clustered in the right lung  base, nicely change from the prior CT. There is a cavitary 12 mm  nodule in the lingula, which previously measured 1.2 cm on 3/27/2018  (however this measured 8 mm on 4/24/2017). 4 mm pulmonary nodule in  the left lower lobe (series 6 image 155) which is unchanged from 4/24/2017.  Upper abdomen: Limited evaluation of the upper abdomen. Unchanged  pneumobilia. Postsurgical changes of liver transplant. Atrophic  kidneys. Adrenal glands are normal.        Impression:   1. Unchanged 12 mm cavitary nodule in the lingula since 3/27/2018.  However, this nodule is increased in size since 4/24/2017 when it  measured 8 mm and was FDG avid on the PET/CT 8/8/2017. Recommend  continued close follow-up (3-6 months) and/or soft tissue biopsy.  2. Extensive granulomatous disease mostly clustered in the right lower lobe.

## 2018-07-25 NOTE — NURSING NOTE
"Chief Complaint   Patient presents with     RECHECK     Follow up     Blood pressure 134/77, pulse 72, temperature 97.8  F (36.6  C), height 1.715 m (5' 7.5\"), weight 78.2 kg (172 lb 4.8 oz), last menstrual period 06/01/1988, SpO2 99 %, not currently breastfeeding.    Henri Blas CMA  "

## 2018-07-25 NOTE — MR AVS SNAPSHOT
After Visit Summary   7/25/2018    Luz Thompson    MRN: [id number]           Patient Information     Date Of Birth          1950        Visit Information        Provider Department      7/25/2018 9:30 AM Crystal Montero MD [facility]         Follow-ups after your visit        Follow-up notes from your care team     Return in about 8 months (around 3/25/2019).      Your next 10 appointments already scheduled     Jul 26, 2018  8:00 PM CDT   PSG Split with SLEEP STUDY  3   [facility])    [address] [place] [id number] [phone]            Jul 30, 2018  9:00 AM CDT   [facility]    [address] [facility] [id number] [phone]           Please do not eat 10-12 hours before your appointment if you are coming in fasting for labs on lipids, cholesterol, or glucose (sugar). This does not apply to pregnant women. Water, hot tea and black coffee (with nothing added) are okay. Do not drink other fluids, diet soda or chew gum.            Jul 30, 2018  1:00 PM CDT   [facility] with Randell Mejia MD   [facility]    [address] [place] [id number]   [phone]            Aug 06, 2018  9:00 AM CDT   (Arrive by 8:45 AM)   RETURN ENDOCRINE with Rach Vasquez MD   [facility] ([facility])    [address] [address] [phone]            Aug 13, 2018  1:00 PM CDT   Return Sleep Patient with Vero Quiñonez MD   [facility])    [address] [place] [address] [phone]            Aug 15, 2018 11:30 AM CDT   (Arrive by 11:15 AM)   RETURN ATRIAL  FIBULATION VISIT with LIZ Sauceda CNP   [facility] ([facility])    [address]  [address]  [facility] [address] [phone]            Sep 14, 2018 10:00 AM CDT   CT CHEST W/O CONTRAST with UCCT2   [facility] CT ([facility])    [address]  1[address] Floor  [facility] [address] [phone]           Please bring any scans or X-rays taken at other hospitals, if similar tests were done. Also bring a list of your medicines, including vitamins, minerals and over-the-counter drugs. It is safest to leave personal items at home.  Be sure to tell your doctor:   If you have any allergies.   If there s any chance you are pregnant.   If you are breastfeeding.  You do not need to do anything special to prepare for this exam.  Please wear loose clothing, such as a sweat suit or jogging clothes. Avoid snaps, zippers and other metal. We may ask you to undress and put on a hospital gown.            Sep 14, 2018 11:00 AM CDT   (Arrive by 10:45 AM)   Return Visit with LIZ Gamboa CNP   [facility] ([facility])    [address]  [address]  [facility] [address] [phone]            May 15, 2019 10:30 AM CDT   (Arrive by 10:15 AM)   Return Visit with Crystal Montero MD   [facility] and Infectious Diseases ([facility])    [address] [place] [address]  [address]  [facility] [address] [phone]              Who to contact     If you have questions or need follow up information about today's clinic visit or your schedule please contact [facility] directly at [phone].  Normal or non-critical lab and imaging results will be communicated to you by MyChart, letter or phone within 4 business days after the clinic has received the results. If you do not hear from us  "within 7 days, please contact the clinic through [employer] or phone. If you have a critical or abnormal lab result, we will notify you by phone as soon as possible.  Submit refill requests through [employer] or call your pharmacy and they will forward the refill request to us. Please allow 3 business days for your refill to be completed.          Additional Information About Your Visit        [employer] Information     [employer] gives you secure access to your electronic health record. If you see a primary care provider, you can also send messages to your care team and make appointments. If you have questions, please call your primary care clinic.  If you do not have a primary care provider, please call [phone] and they will assist you.        Care EveryWhere ID     This is your Care EveryWhere ID. This could be used by other organizations to access your [place] [place] medical records  [id number]        Your Vitals Were     Pulse Temperature Height Last Period Pulse Oximetry BMI (Body Mass Index)    72 97.8  F (36.6  C) 1.715 m (5' 7.5\") 06/01/1988 (Approximate) 99% 26.59 kg/m2       Blood Pressure from Last 3 Encounters:   07/25/18 134/77   07/16/18 149/69   07/13/18 136/78    Weight from Last 3 Encounters:   07/25/18 78.2 kg (172 lb 4.8 oz)   07/16/18 78 kg (172 lb)   07/13/18 78.8 kg (173 lb 11.2 oz)              Today, you had the following     No orders found for display       Primary Care Provider Office Phone # Fax #    Jennifer Amparo Barraza [phone]       [address]  [facility] [address]        Equal Access to Services     CARLA [facility] : Hadkaylee Ma, [facility] den[facility], etta krueger. So [facility] [phone].    ATENCIÓN: Si habla español, tiene a jason disposición servicios gratuitos de asistencia lingüística. Jonas al [phone].    We comply with applicable federal civil rights laws and [place] laws. We do not " discriminate on the basis of race, color, national origin, age, disability, sex, sexual orientation, or gender identity.            Thank you!     Thank you for choosing [facility]  for your care. Our goal is always to provide you with excellent care. Hearing back from our patients is one way we can continue to improve our services. Please take a few minutes to complete the written survey that you may receive in the mail after your visit with us. Thank you!             Your Updated Medication List - Protect others around you: Learn how to safely use, store and throw away your medicines at www.disposemymeds.org.          This list is accurate as of 7/25/18 10:16 AM.  Always use your most recent med list.                   Brand Name Dispense Instructions for use Diagnosis    acetaminophen 500 MG Caps      Take 1,000 mg by mouth nightly as needed Take 500-1,000 mg by mouth every 6 hours if needed.        ARTIFICIAL TEARS OP      Apply 1 drop to eye as needed        BENEFIBER Powd      2 teaspoons daily        calcitRIOL 0.5 MCG capsule    ROCALTROL    90 capsule    Take 1 capsule (0.5 mcg) by mouth daily    Postablative hypoparathyroidism (H)       ciprofloxacin-dexamethasone otic suspension    CIPRODEX    5.6 mL    Place 4 drops Into the left ear 2 times daily for 14 days    Other infective chronic otitis externa of left ear, Tympanic membrane perforation, left, Sensorineural hearing loss (SNHL) of both ears       clotrimazole 1 % cream    LOTRIMIN     Apply topically 2 times daily as needed Reported on 4/25/2017        ELIQUIS 2.5 MG tablet   Generic drug:  apixaban ANTICOAGULANT      Take 2.5 mg by mouth 2 times daily        estradiol 0.1 MG/GM cream    ESTRACE VAGINAL    42.5 g    Place 2 g vaginally twice a week    Atrophic vaginitis       ezetimibe 10 MG tablet    ZETIA    30 tablet    Take 10 mg by mouth every evening        folic acid 1 MG tablet    FOLVITE    100 tablet     Take 1 tablet (1 mg) by mouth daily    Liver replaced by transplant (H)       furosemide 20 MG tablet    LASIX    46 tablet    Take 0.5 tablets (10 mg) by mouth daily    CKD (chronic kidney disease) stage 3, GFR 30-59 ml/min, Liver replaced by transplant (H)       hydrALAZINE 25 MG tablet    APRESOLINE    270 tablet    Take 0.5 tablets (12.5 mg) by mouth 3 times daily as needed , if systolic blood pressure is more than 140 mmHg.    HTN (hypertension)       ketoconazole 2 % cream    NIZORAL    15 g    Apply topically 2 times daily To angles of mouth    Angular cheilitis       LOVAZA 1 g capsule   Generic drug:  omega-3 acid ethyl esters     360 capsule    Take 1 capsule (1 g) by mouth 2 times daily    Hypertriglyceridemia       meclizine 25 MG tablet    ANTIVERT    30 tablet    Take 1 tablet (25 mg) by mouth as needed    Dizziness       metoprolol succinate 50 MG 24 hr tablet    TOPROL-XL    90 tablet    Take 1 tablet (50 mg) by mouth daily    Paroxysmal atrial fibrillation (H)       neomycin-polymyxin-hydrocortisone 3.5-22171-0 otic suspension    CORTISPORIN    10 mL    Place 4 drops Into the left ear 4 times daily    Acute swimmer's ear of left side       nitroFURantoin (macrocrystal-monohydrate) 100 MG capsule    MACROBID    14 capsule    Take 1 capsule (100 mg) by mouth 2 times daily For one week at onset of UTI symptoms    Urinary tract infection without hematuria, site unspecified       predniSONE 5 MG tablet    DELTASONE    90 tablet    Take 1 tablet (5 mg) by mouth daily    Thyroid cancer (H), Liver replaced by transplant (H)       PRESERVISION AREDS 2 Caps      Take 1 capsule by mouth 2 times daily        sertraline 50 MG tablet    ZOLOFT    90 tablet    Take 1 tablet (50 mg) by mouth daily    Adjustment disorder with depressed mood       sirolimus 0.5 MG Tabs tablet      Take 0.5 mg by mouth every other day        SUMAtriptan 50 MG tablet    IMITREX    30 tablet    Take 1 tablet (50 mg) by mouth at onset  of headache for migraine repeat after 2 hours if needed.    Migraine without aura and without status migrainosus, not intractable       * SYNTHROID 150 MCG tablet   Generic drug:  levothyroxine      Take 225 mcg by mouth on Mondays        * levothyroxine 150 MCG tablet    SYNTHROID    120 tablet    Take 150mcg by mouth daily 6 days a week & 225mcg on Mondays    Malignant neoplasm of thyroid gland (H)       triamcinolone 0.1 % cream    KENALOG     Apply topically 2 times daily as needed for irritation        ursodiol 250 MG tablet    ACTIGALL    180 tablet    Take 1 tablet (250 mg) by mouth 2 times daily    Liver replaced by transplant (H)       voriconazole 200 MG tablet    VFEND    60 tablet    Take 1 tablet (200 mg) by mouth 2 times daily    Coccidioidal pneumonitis (H)       * Notice:  This list has 2 medication(s) that are the same as other medications prescribed for you. Read the directions carefully, and ask your doctor or other care provider to review them with you.

## 2018-07-25 NOTE — LETTER
7/25/2018       RE: Luz Thompson  [address]  [place] [address]     Dear Colleague,    Thank you for referring your patient, Luz Thompson, to the [facility] AND INFECTIOUS DISEASES at [facility]. Please see a copy of my visit note below.    [facility]  Transplant Infectious Disease Clinic Note     Patient:  Luz Thompson, Date of birth 1950, Medical record number [id number]  Date of Visit:  07/25/2018         Assessment and Recommendations:   Recommendations:  - Continue voriconazole. Prior auth for vori refills will be completed as needed.   - Transplant labs next week to include inflammatory markers, liver testing and voriconazole level. Orders placed.   - Follow-up Chest CT scheduled for 9/14/2018.   - Return to clinic upon [place], ~ 8 months from now.    Assessment: Virginia is a 68 year old woman immunosuppressed s/p 2002 liver transplant with recurrent gram negative sepsis. She has known sigmoid diverticulosis. She gets episodes of defecation syncope.   ID issues:  - Coccidioides infection. Followup CT imaging 6/27/2018 shows no worsening of the nodularities over a 1-yr interval; we reviewed this scan together in clinic.  She continues on vori for cocci, and she's had some dry skin and areas of frequent picking of skin since she started vori. Continue voriconazole. Prior auth for vori refills will be completed as needed. Transplant labs next week to include inflammatory markers, liver testing and voriconazole level. Follow-up Chest CT scheduled for 9/14/2018.   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 Will check EBV level with labs on 7/30/2018.  - Left otitis. On ciprodex drops. Followed in ENT clinic due to perforation.   - Self-triggered use of prn antibiotics due to recurrent episodes of sepsis. She gets a lot  of itching with vantin, and quinolones and macrolides don't seem to work that well for her, so her current prn med is bactrim. If the bactrim does not do the job in controlling fever, because she has allergies to penicillins and quinolones (cipro and levaquin), she knows to be seen somewhere where she could be evaluated for a once daily infusion of ertapenem (also called Invanz).   - Hx of bacterial superinfection of chronic venous stasis changes on the legs, 7/27/2016.  - Hx of recurrent E coli sepsis 8/13/2013, 6/10/2015.   - Hx of Klebsiella UTI, 7/18/16. She completed a 14-day course of macrobid.  - Hx of Haemophilus influenzae pneumonia in 9/2014.  - Hx of angular chelitis, hx of thrush extending from under her upper denture plate, and onychomycosis.   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   - QTc interval 5/18/2018 457 msec.   - PCP prophylaxis: Not needed, as most recent CD4 count was > 200.   - Serostatus: CMV seronegative, EBV seropositive on 8/15/13.  - Immunization status: Completed PCV13 and PPSV23 x2 with last dose in 5/2016.  - Gamma globulin status: Endogenously replete.  - Isolation status: Good hand hygience. When she is an inpatient, she needs to be in contact isolation for known VRE carriage.    Crystal Montero MD. 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 She has venous stasis changes of her legs, and she can get stasis dermatitis if she does not wear her compression stockings. This is especially problematic if the weather is very warm and for that reason she does not want to wear compression stockings. She received Rituxan therapy for EBV viremia on 8/31/2016. She had some side effects in the days following the dose of Rituxan, but otherwise would be able to tolerate it again in the future. In the 4355-6325 winter season, she was diagnosed with  coccidioidomycosis. There was a strong pulmonary component to the infection, and she is being treated with voriconazole.     Since Virginia was last seen in ID clinic 8/16/2017, she is taking less tylenol for aches and pains, as she is getting into better. She continues on vori for cocci, and she's had some dry skin and areas of frequent picking of skin since she started vori. F/u Chest CT 6/27/2018 shows no worsening of the nodularities over a 1-yr interval; we reviewed this scan together in clinic. She is working with a  to try to get in better physical condition. She is following closely with ENT for frequent ear infections, that has included L TM perforation; she is treated with ciprodex drops. She is happy to report that her ears are improved to the point where she can wear both hearing aids again. Her abd is better since she had cut her dose of omega 3 in half. She is  from her  of 39 years. She has started living in an apt in [place], and [place].     Transplants:  5/22/2002 (Liver), Postoperative day:  5908         Review of Systems:   CONSTITUTIONAL:  No fever, chills, night sweats.  EYES: negative for icterus. Vision good with treatment of cataract and glaucoma issues.   ENT:  She wears hearing aids. She takes her dentures out at night. No sore throat.   RESPIRATORY:  No cough, no sputum, but sometimes she has dyspnea.   CARDIOVASCULAR:  negative for chest pain, no heart palpitations  GASTROINTESTINAL:  negative for nausea, vomiting, diarrhea, constipation.  GENITOURINARY:  No dysuria or hematuria  HEME:  Normal amount of easy bruising, + easy bleeding.   INTEGUMENT:  She has her normal amount of itching from dry skin. No new rash.   NEURO:  No headache. No tremor.     Past Medical History:   Diagnosis Date     Anemia of other chronic disease 10/17/2011     Anxiety      Bladder infection, chronic 4/4/2012     CKD (chronic kidney  "disease) stage 3, GFR 30-59 ml/min 4/4/2012     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      Diverticulosis of sigmoid colon 12/21/2013     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     Received Rituxan during Summer of 2016     H/O esophageal varices      Hearing loss      Heart murmur 4/4/2012     History of DVT (deep vein thrombosis)      History of [facility] fever      History of thyroid cancer 9/25/2012     Hyperlipidemia 4/10/2012    Says that she does not have it anymore, not on meds     Hyperlipidemia LDL goal <70      Hypertension goal BP (blood pressure) < 140/80 11/06/2013     Hypertriglyceridemia      Liver replaced by transplant (H) 10/17/2011    Dr. 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       Past Surgical History:   Procedure Laterality Date     APPENDECTOMY  1961     BIOPSY       CATARACT IOL, RT/LT      RE12/19/2013, LE12/10/2013 - Toric lenses     CHOLECYSTECTOMY  1991     COLONOSCOPY  3/10/2014    Procedure: COLONOSCOPY;;  Surgeon: Gentry Ramirez MD;  Location:  GI     ear drum repair       ENDOBRONCHIAL ULTRASOUND FLEXIBLE N/A 9/29/2017    Procedure: ENDOBRONCHIAL ULTRASOUND FLEXIBLE;  Flexible Bronchoscopy, Endobronchial Ultrasound, Transbronchial Needle Aspiration ;  Surgeon: Eden Clinton MD;  Location:  OR     ENDOSCOPIC RETROGRADE CHOLANGIOPANCREATOGRAM  9/19/2013    " Procedure: ENDOSCOPIC RETROGRADE CHOLANGIOPANCREATOGRAM;  Endoscopic Retrograde Cholangiopancreatogram with single balloon enteroscopy, ballon sweep of bile duct;  Surgeon: Brett Membreno MD;  Location: [facility] OR     HC KNEE SCOPE,MED/LAT MENISECTOMY  8/10/12    Right, partial medial menisectomy only     KNEE SURGERY  1966    R knee     PICC INSERTION  2013    4fr SL PASV PICC, 40cm (1cm external) in the R basilic vein w/ tip in the low SVC     PICC INSERTION  2014    5 fr DL BioFlo Navilyst PICC, 46 cm (3 cm external) in the L basilic vein w/ tip in the SVC RA junction.     THYROIDECTOMY  3/2010     TRANSPLANT LIVER RECIPIENT LIVING UNRELATED         Family History   Problem Relation Age of Onset     Hypertension Mother      Endometrial Cancer Mother      Hyperlipidemia Mother      Prostate Cancer Father      Macular Degeneration Father      Cancer - colorectal Maternal Grandmother      in her 80's, has surgery and removal of part of kidney,  at age 98     HEART DISEASE Maternal Grandfather       at 98     Glaucoma Maternal Grandfather      Cerebrovascular Disease Paternal Grandmother      in her 80's     Hypertension Paternal Grandmother      HEART DISEASE Paternal Grandfather      MI     Alzheimer Disease Paternal Grandfather      Allergies Son      Neurologic Disorder Daughter      Migraines     Breast Cancer Other      Anesthesia Reaction No family hx of      Crohn Disease No family hx of      Ulcerative Colitis No family hx of        Social History     Social History Narrative    She is retired. [place] of the [place]. 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     Social History   Substance Use Topics     Smoking status: Former Smoker     Packs/day: 1.00     Years: 18.00     Types: Cigarettes     Quit date: 1985     Smokeless tobacco: Never Used     Alcohol use 0.0 oz/week     0  "Standard drinks or equivalent per week      Comment: rare - \"I toast at weddings\"       Immunization History   Administered Date(s) Administered     A4g3-89 Novel Flu P-free 11/10/2009     Influenza (High Dose) 3 valent vaccine 09/25/2015, 10/30/2016, 10/23/2017     Influenza (IIV3) PF 11/10/2004, 09/29/2007, 10/01/2010, 09/27/2012, 10/29/2012, 09/30/2013, 09/01/2016     Pneumo Conj 13-V (2010&after) 02/26/2014     Pneumococcal 23 valent 12/01/2007, 05/25/2016     TD (ADULT, 7+) 01/01/2007     TDAP Vaccine (Adacel) 09/17/2007, 02/26/2014       Patient Active Problem List   Diagnosis     Bladder infection, chronic     Osteoporosis     Osteoarthritis of right knee     HDL deficiency     Advanced directives, counseling/discussion     Vitamin D deficiency     S/P tympanoplasty     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     Prophylactic antibiotic     High risk medication use     Statin intolerance     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     Coccidioidal pneumonitis (H)     SNHL (sensorineural hearing loss)     Abnormal liver function tests     Diagnostic skin and sensitization tests     Perforation bowel (H)     Liver replaced by transplant (H)     Adjustment disorder with depressed mood     Type 2 diabetes, HbA1c goal < 7% (H)     Hyperlipidemia LDL goal <70     Hypertension goal BP (blood pressure) < 140/80     Postoperative hypothyroidism       Outpatient Prescriptions Marked as Taking for the 7/25/18 encounter (Office Visit) with Crystal Montero MD   Medication Sig     calcitRIOL (ROCALTROL) 0.5 MCG capsule Take 1 capsule (0.5 mcg) by mouth daily     ciprofloxacin-dexamethasone (CIPRODEX) otic suspension Place 4 drops Into the left ear 2 times daily for 14 days     clotrimazole (LOTRIMIN) 1 % cream Apply topically 2 times daily as needed Reported on 4/25/2017     ELIQUIS 2.5 MG tablet Take 2.5 mg by mouth 2 times daily      estradiol (ESTRACE VAGINAL) 0.1 MG/GM cream Place 2 g vaginally twice a week     ezetimibe (ZETIA) 10 MG tablet Take " 10 mg by mouth every evening      folic acid (FOLVITE) 1 MG tablet Take 1 tablet (1 mg) by mouth daily     furosemide (LASIX) 20 MG tablet Take 0.5 tablets (10 mg) by mouth daily     Hypromellose (ARTIFICIAL TEARS OP) Apply 1 drop to eye as needed     levothyroxine (SYNTHROID) 150 MCG tablet Take 150mcg by mouth daily 6 days a week & 225mcg on Mondays     metoprolol (TOPROL-XL) 50 MG 24 hr tablet Take 1 tablet (50 mg) by mouth daily     Multiple Vitamins-Minerals (PRESERVISION AREDS 2) CAPS Take 1 capsule by mouth 2 times daily     neomycin-polymyxin-hydrocortisone (CORTISPORIN) 3.5-83810-6 otic suspension Place 4 drops Into the left ear 4 times daily     omega-3 acid ethyl esters (LOVAZA) 1 g capsule Take 1 capsule (1 g) by mouth 2 times daily     predniSONE (DELTASONE) 5 MG tablet Take 1 tablet (5 mg) by mouth daily     RAPAMUNE (BRAND) 0.5 MG PO TABLET Take 0.5 mg by mouth every other day     sertraline (ZOLOFT) 50 MG tablet Take 1 tablet (50 mg) by mouth daily     ursodiol (ACTIGALL) 250 MG tablet Take 1 tablet (250 mg) by mouth 2 times daily     voriconazole (VFEND) 200 MG tablet Take 1 tablet (200 mg) by mouth 2 times daily     Wheat Dextrin (BENEFIBER) POWD 2 teaspoons daily       Allergies   Allergen Reactions     Fluconazole Hives and Itching     Ciprofloxacin Anxiety and Other (See Comments)     Irregular heart beat     Azithromycin Itching     Benadryl [Diphenhydramine Hcl]      Insomnia      Cellcept Diarrhea     Ciprofloxacin Other (See Comments)     Insomnia, mood lability     Codeine      Psych disturbance     Codeine      Diphenhydramine Other (See Comments)     Doxycycline      Lansoprazole Diarrhea     Levaquin [Levofloxacin] Other (See Comments)     Headache, hyperactivity     Lisinopril Cough     Methotrexate      Sores     Methotrexate      Morphine Sulfate Itching     Mycophenolate Diarrhea     No Clinical Screening - See Comments      Simvastatin Muscle Pain (Myalgia)     severe     Cephalexin  "Rash     Fever and skin burning     Penicillin G Itching and Rash     Tolectin [Nsaids] Rash     Tramadol Rash            Physical Exam:   Vitals were reviewed.  All vitals stable.  /77  Pulse 72  Temp 97.8  F (36.6  C)  Ht 1.715 m (5' 7.5\")  Wt 78.2 kg (172 lb 4.8 oz)  LMP 06/01/1988 (Approximate)  SpO2 99%  BMI 26.59 kg/m2   Wt Readings from Last 4 Encounters:   07/25/18 78.2 kg (172 lb 4.8 oz)   07/16/18 78 kg (172 lb)   07/13/18 78.8 kg (173 lb 11.2 oz)   06/29/18 79.4 kg (175 lb)       Physical Examination:  GENERAL:  well-developed, well-nourished woman, in no acute distress.  HEENT:  Head is normocephalic, atraumatic   EYES:  Eyes have anicteric sclerae.   ENT: Hearing aids in place today. Dentures in place, no thrush. Mild angular cheilitis.   NECK:  Supple.   LUNGS: Clear to auscultation bilateral.   CARDIOVASCULAR: Regular rate and rhythm with 2/6 systolic murmur heard best at RUSB but no gallops or rubs.   ABDOMEN: Normal bowel sounds, not tender. Subcostal surgical scar not otherwise examined.  SKIN:  No acute rash. She is wearing compression stockings. Onychomycosis not examined today.   NEUROLOGIC:  Grossly nonfocal. Active x4 extremities         Laboratory Data:     Absolute CD4   Date Value Ref Range Status   08/19/2014 415 mm3 Final   09/16/2013 1266 mm3 Final   09/15/2013 DUPLICATE,TESTING DONE ON SPECIMEN FROM 9.16.13 mm3 Final   11/13/2008 1332 mm3 Final       Inflammatory Markers    Recent Labs   Lab Test  09/03/17   0912  09/02/17   0648  09/01/17   0832  05/14/16   0659  05/13/16   0940  09/23/14   0810  08/19/14   1530  07/23/14   1119  06/30/14   0825  05/15/14   1112  05/08/14   0806  03/04/14   1315  02/27/14   1930   SED   --    --    --    --   67*  79*  76*   --   51*  58*  51*  55*  76*   CRP  64.0*  71.0*  51.0*  21.0*  19.0*  6.1  29.8*  13.7*  12.5*  8.0  <5.0  5.5  16.4*       Immune Globulin Studies    Recent Labs   Lab Test  08/19/14   1530  05/21/12   0754   IGG  " 1220  1070   IGM   --   97   IGE  46   --    IGA   --   85   IGG1  684   --    IGG2  441   --    IGG3  70   --    IGG4  19   --        Metabolic Studies       Recent Labs   Lab Test  05/19/18   0629  05/18/18   0731  05/14/18   1019  04/17/18   0942  10/05/17   0907  09/29/17   0854  09/18/17   0921   05/10/17   0929   05/24/16   0846   05/13/16   0940   07/23/14   1119   NA  136  137  133  138  139   --   137   < >  138   < >  135   < >  132*   < >  137   POTASSIUM  4.1  3.6  3.3*  3.9  4.0  4.0  5.0   < >  4.2   < >  3.6   < >  3.6   < >  3.8   CHLORIDE  103  100  100  102  100   --   99   < >  102   < >  99   < >  96   < >  98   CO2  27  27  27  28  28   --   32   < >  26   < >  27   < >  26   < >  27   ANIONGAP  6  10  6  8  11   --   6   < >  10   < >  9   < >  10   < >  12   BUN  20  20  23  36*  33*   --   36*   < >  36*   < >  28   < >  29   < >  35*   CR  1.29*  1.22*  1.40*  1.74*  1.38*   --   1.26*   < >  1.53*   < >  1.77*   < >  1.64*   < >  1.70*   GFRESTIMATED  41*  44*  37*  29*  38*   --   42*   < >  34*   < >  29*   < >  31*   < >  30*   GLC  103*  119*  124*  125*  93   --   108*   < >  104*   < >  94   < >  98   < >  109*   A1C   --   6.8*   --    --    --    --    --    --    --    --    --    --    --    --    --    KEILY  8.5  8.6  9.0  9.1  9.9   --   9.6   < >  9.7   < >  8.8   < >  9.1   < >  9.1   PHOS   --    --    --    --    --    --    --    --   3.1   --   3.2   --    --    < >   --    MAG   --    --    --   2.8*   --    --   2.2   < >  2.8*   < >  2.3   --   2.2   < >   --    LACT   --    --    --    --    --    --    --    --    --    --    --    --   0.8   --   1.7    < > = values in this interval not displayed.       Hepatic Studies    Recent Labs   Lab Test  05/14/18   1019  04/17/18   0942  10/23/17   1024  10/05/17   0907  09/26/17   0916  09/18/17   0921   BILITOTAL  0.2  0.3  0.3  0.4  0.4  0.4   ALKPHOS  240*  211*  222*  212*  229*  248*   ALBUMIN  3.1*  3.3*  3.2*  3.2*   3.2*  3.1*   AST  23  26  23  15  23  58*   ALT  43  42  47  33  60*  150*       Lipid testing    Recent Labs   Lab Test  05/18/18   0731  10/05/17   0907  08/22/17   0905   09/18/15   0954  05/08/14   0806   CHOL  265*  231*  291*   < >  181  159   HDL  49*  50  49*   < >  63  48*   LDL  Cannot estimate LDL when triglyceride exceeds 400 mg/dL  150*  Cannot estimate LDL when triglyceride exceeds 400 mg/dL  135*  Cannot estimate LDL when triglyceride exceeds 400 mg/dL  174*   < >  84  88   TRIG  557*  419*  447*   < >  172*  112   CHOLHDLRATIO   --    --    --    --   2.9  3.3    < > = values in this interval not displayed.       Gout Labs      Recent Labs   Lab Test  12/31/13   1443  07/30/13   1441   URIC  6.7  6.7       Hematology Studies      Recent Labs   Lab Test  05/19/18   0629  05/18/18   0731  05/14/18   1019  04/17/18   0942  09/18/17   0921  09/07/17   0832   09/02/17   0648  09/01/17   0832  08/31/17   1306   05/16/16   0827   05/14/16   0659   WBC  6.1  8.0  8.6  8.3  11.4*  6.8   < >  10.2  9.4  10.6   < >  5.7   < >  4.7   ANEU   --   5.0   --    --    --    --    --   7.4  6.5  8.6*   --   2.5   --   2.1   ALYM   --   1.9   --    --    --    --    --   1.7  1.7  1.3   --   2.4   --   1.6   KATHY   --   1.0   --    --    --    --    --   0.9  1.0  0.7   --   0.6   --   0.9   AEOS   --   0.1   --    --    --    --    --   0.2  0.1  0.0   --   0.1   --   0.1   HGB  10.3*  11.6*  11.5*  11.9  12.2  11.9   < >  10.5*  11.1*  12.4   < >  11.1*   < >  10.0*   HCT  33.0*  36.0  36.1  37.7  37.2  37.2   < >  34.0*  35.0  38.5   < >  35.4   < >  31.8*   PLT  302  324  326  348  399  333   < >  259  269  290   < >  256   < >  230    < > = values in this interval not displayed.       Iron Testing    Recent Labs   Lab Test  05/19/18   0629   09/13/17   0919   07/11/13   0814   04/18/13   0809  03/21/13   0812 12/22/12   1346   12/20/12 2005 12/07/12   1345   IRON   --    --    --    --   58   --   63  90    < >   --    --    --   48   FEB   --    --    --    --   285   --   315  285   < >   --    --    --   156*   IRONSAT   --    --    --    --   20   --   20  32   < >   --    --    --   30   LAURA   --    --   44   --   195   < >  232  202   < >   --    --    --   317*   MCV  88   < >   --    < >  88   < >  92  98   < >  100   < >  103*   --    B12   --    --    --    --    --    --    --    --    --    --    --    --   281   EN1806   --    --    --    --    --    --    --    --    --    --    --    --   272*   HAPT   --    --    --    --    --    --    --    --    --    --    --   137   --    RETP   --    --    --    --    --    --    --    --    --   2.7*   --    --    --    RETICABSCT   --    --    --    --    --    --    --    --    --   71.0   --    --    --     < > = values in this interval not displayed.       Clotting Studies    Recent Labs   Lab Test  05/18/18   0731  05/16/16   0827  05/13/16   0940  11/06/13   1246   06/19/11   1815   INR  1.06  1.02  1.11  0.96   < >  1.08   PTT  33   --   33   --    --   28    < > = values in this interval not displayed.       Thyroid Studies     Recent Labs   Lab Test  07/13/17   0843  05/10/17   0929  07/18/16   1430  05/24/16   0846  07/20/15   1010   09/23/14   0810   05/08/14   0806   TSH  3.66  4.74*  2.43  3.65  3.41   < >  2.87   < >  4.41   T4  1.59*  1.48*  1.38  1.19  1.24   < >  1.29  9.1   --   1.19  9.1   T3   --    --    --    --    --    --   65   --   48*    < > = values in this interval not displayed.       Urine Studies     Recent Labs   Lab Test  05/02/18   1008  08/31/17   2100  08/22/17   0913  07/18/16   1503  05/13/16   1114   08/19/14   1410   URINEPH  6.0  8.0*  7.0  5.5  6.5   < >  7.0   NITRITE  Neg  Negative  Negative  Negative  Negative   < >  Negative   LEUKEST  Trace  Small*  Trace*  Large*  Moderate*   < >  Negative   WBCU   --   2  2-5*  10-25*  4*   --   <1    < > = values in this interval not displayed.       Medication levels    Recent  Labs   Lab Test  04/17/18   0942  10/23/17   1025   09/15/13   0435   12/21/12   1344   VANCOMYCIN   --    --    --   26.3   --    --    CYCLSP   --   Canceled, Test credited   < >   --    --    --    RAPAMY  6.2   --    < >   --    < >   --    MPACID   --    --    --    --    --   5.42*   MPAG   --    --    --    --    --   83.5    < > = values in this interval not displayed.       Microbiology:  Last Culture results with specimen source  Culture Micro   Date Value Ref Range Status   07/18/2016 (A)  Final    50,000 to 100,000 colonies/mL Klebsiella pneumoniae   05/21/2016 No growth  Final   05/21/2016 No growth  Final   05/16/2016 Canceled, Test credited Duplicate request  Final   05/16/2016 Canceled, Test credited Duplicate request  Final   05/16/2016 No growth  Final   05/16/2016 No growth  Final   05/13/2016 No growth after 29 days  Final   05/13/2016   Final    Canceled, Test credited Quantity not sufficient Notification of test cancellation   was given to [facility]THEW FROM [facility], HE WILL REORDER AND RECOLLECT     05/13/2016 No growth  Final   05/13/2016 No growth  Final   05/13/2016   Final    10,000 to 50,000 colonies/mL mixed urogenital louann  Susceptibility testing not routinely done     05/13/2016 No growth  Final   09/25/2014 (A)  Final    Normal louann  Moderate growth Haemophilus influenzae Beta lactamase negative     09/03/2014 (A)  Final    Normal louann  Heavy growth Haemophilus influenzae Beta lactamase negative  beta lactamase negative isolates are susceptible to ampicillin,   amoxacillin/clavulanic, levofloxacin and ceftriaxone     08/19/2014 No growth  Final   08/19/2014 No growth  Final   07/24/2014 No growth  Final   07/24/2014 No growth  Final    Specimen Description   Date Value Ref Range Status   07/18/2016 Midstream Urine  Final   05/21/2016 Blood Right Arm  Final   05/21/2016 Blood Left Arm  Final   05/16/2016 Blood  Final   05/16/2016 Blood  Final   05/16/2016 Blood Left Arm  Final    05/16/2016 Blood Right Arm  Final   05/13/2016 Blood Unspecified Site  Final   05/13/2016 Blood Unspecified Site  Final   05/13/2016 Blood Right Arm  Final   05/13/2016 Blood Right Arm  Final   05/13/2016 Midstream Urine  Final   05/13/2016 Nasal  Final   05/13/2016 Blood Venipuncture Collection VPT  Final   09/25/2014 Sputum  Final   09/25/2014 Sputum  Final   09/03/2014 Sputum  Final   09/03/2014 Sputum  Final   08/19/2014 Blood Right Arm  Final        Last check of C difficile  C Diff Toxin B PCR   Date Value Ref Range Status   05/17/2012   Final    Negative: Clostridium difficile target DNA sequences NOT detected, presumed   negative for Clostridium difficile toxin B or the number of bacteria present   may be below the limit of detection for the test.   FDA approved assay performed using Bidstalk real-time PCR.   A negative result does not exclude actual disease due to Clostridium difficile   and may be due to improper collection, handling and storage of the specimen or   the number of organisms in the specimen is below the detection limit of the   assay.       Virology:  CMV Quantitative   Date Value Ref Range Status   08/19/2014 <100 <100 Copies/mL Final   08/15/2013 <100 <100 Copies/mL Final   11/11/2008 <100 <100 Copies/mL Final     Comment:     No CMV DNA detected.   08/21/2007 <100 <100 Copies/mL Final     Comment:     No CMV DNA detected.   01/10/2007 <100 <100 Copies/mL Final     Comment:     No CMV DNA detected.       EBV DNA Copies/mL   Date Value Ref Range Status   08/22/2017 EBV DNA Not Detected EBVNEG^EBV DNA Not Detected [Copies]/mL Final   04/11/2017 (A) EBVNEG [Copies]/mL Final    <500  EBV DNA Detected below the reportable range of 500 Copies/mL     12/09/2016 1219 (A) EBVNEG [Copies]/mL Final   08/08/2016 56093 (A) EBVNEG [Copies]/mL Final   07/26/2016 42027 (A) EBVNEG [Copies]/mL Final   05/24/2016 60790 (A) EBVNEG [Copies]/mL Final   05/13/2016 09521 (A) EBVNEG [Copies]/mL Final    11/20/2015 17116 (A) EBVNEG [Copies]/mL Final   09/14/2015 53096 (A) EBVNEG [Copies]/mL Final   07/20/2015 41100 (A) EBVNEG [Copies]/mL Final   09/15/2013 <1000 <1000 Copies/mL Final   08/15/2013 <1000 <1000 Copies/mL Final   11/12/2008 <1000 <1000 Copies/mL Final       BK viral loads   Recent Labs   Lab Test  07/28/11   1150   BKSPEC  Urine   BKRES  <1000       Imaging   CT CHEST W/O CONTRAST 6/27/2018 3:15 PM  Comparison: CT chest 3/27/2018, 7/31/2017, 4/24/2017; PET CT 8/8/2017   Findings: Chest: Heart size within normal limits. No pericardial effusion. The  thoracic aorta and pulmonary artery are normal caliber. No enlarged  thoracic lymph nodes by CT short axis size criteria.  Central airways are patent. No pleural effusion or pneumothorax.  Innumerable calcified granulomas, mostly clustered in the right lung  base, nicely change from the prior CT. There is a cavitary 12 mm  nodule in the lingula, which previously measured 1.2 cm on 3/27/2018  (however this measured 8 mm on 4/24/2017). 4 mm pulmonary nodule in  the left lower lobe (series 6 image 155) which is unchanged from 4/24/2017.  Upper abdomen: Limited evaluation of the upper abdomen. Unchanged  pneumobilia. Postsurgical changes of liver transplant. Atrophic  kidneys. Adrenal glands are normal.        Impression:   1. Unchanged 12 mm cavitary nodule in the lingula since 3/27/2018.  However, this nodule is increased in size since 4/24/2017 when it  measured 8 mm and was FDG avid on the PET/CT 8/8/2017. Recommend  continued close follow-up (3-6 months) and/or soft tissue biopsy.  2. Extensive granulomatous disease mostly clustered in the right lower lobe.       Again, thank you for allowing me to participate in the care of your patient.      Sincerely,    Crystal Montero MD

## 2018-07-26 ENCOUNTER — THERAPY VISIT (OUTPATIENT)
Dept: SLEEP MEDICINE | Facility: CLINIC | Age: 68
End: 2018-07-26
Payer: MEDICARE

## 2018-07-26 DIAGNOSIS — G47.9 SLEEP DISORDER: ICD-10-CM

## 2018-07-26 PROCEDURE — 95810 POLYSOM 6/> YRS 4/> PARAM: CPT | Performed by: INTERNAL MEDICINE

## 2018-07-26 NOTE — MR AVS SNAPSHOT
After Visit Summary   7/26/2018    Luz Thompson    MRN: [id number]           Patient Information     Date Of Birth          1950        Visit Information        Provider Department      7/26/2018 8:00 PM SLEEP STUDY RM 3 [facility], [place], Sleep Study        Today's Diagnoses     Sleep disorder          Care Instructions    [place] [facility]    1. Your sleep study will be reviewed by a sleep physician within the next few days.     2. Please follow up in the sleep clinic as scheduled, or, make an appointment with your sleep provider to be seen within two weeks to discuss the results of the sleep study.    3. If you have any questions or problems with your treatment plan, please contact your sleep clinic provider at [phone] to further manage your condition.    4. Please review your attached medication list, and, at your follow-up appointment advise your sleep clinic provider about any changes.    5. Go to [email or web address] for more information about your sleep problems.    ILYA Drake  July 26, 2018                Follow-ups after your visit        Your next 10 appointments already scheduled     Jul 30, 2018  9:00 AM CDT   LAB with AN LAB   [facility] ([facility]    [address] [address] [address] [phone]           Please do not eat 10-12 hours before your appointment if you are coming in fasting for labs on lipids, cholesterol, or glucose (sugar). This does not apply to pregnant women. Water, hot tea and black coffee (with nothing added) are okay. Do not drink other fluids, diet soda or chew gum.            Jul 30, 2018  1:00 PM CDT   Return Visit with Randell Mejia MD   [facility] ([address] [address]   [phone]            Aug 06, 2018  9:00 AM CDT   (Arrive by 8:45 AM)   RETURN ENDOCRINE with Rach Vasquez MD    [facility] Endocr[facility]ology ([facility])    [address]  3rd Floor  [facility] [id number] [phone]            Aug 13, 2018  1:00 PM CDT   Return Sleep Patient with Vero Quiñonez MD   [facility] [place], Sleep Study ([facility])    [address] [place] [id number] [phone]            Aug 15, 2018 11:30 AM CDT   (Arrive by 11:15 AM)   RETURN ATRIAL FIBULATION VISIT with LIZ Sauceda CNP   [facility] Heart Care ([facility])    [address] [place] [address] Se  [address]  [facility] [id number] [phone]            Sep 14, 2018 10:00 AM CDT   CT CHEST W/O CONTRAST with UCCT2   [facility] ([facility])    [address]  1st Floor  [facility] [id number] [phone]           Please bring any scans or X-rays taken at other hospitals, if similar tests were done. Also bring a list of your medicines, including vitamins, minerals and over-the-counter drugs. It is safest to leave personal items at home.  Be sure to tell your doctor:   If you have any allergies.   If there s any chance you are pregnant.   If you are breastfeeding.  You do not need to do anything special to prepare for this exam.  Please wear loose clothing, such as a sweat suit or jogging clothes. Avoid snaps, zippers and other metal. We may ask you to undress and put on a hospital gown.            Sep 14, 2018 11:00 AM CDT   (Arrive by 10:45 AM)   Return Visit with LIZ Gamboa CNP   [facility] ([facility])    [address]  [address]  [facility] [id number] [phone]            May 15, 2019 10:30 AM CDT   (Arrive by 10:15 AM)   Return Visit with Crystal Montero MD   [facility] and Infectious Diseases ([facility])    [address] [place] [address]    [address]  [facility] [address] [phone]              Future tests that were ordered for you today     Open Future Orders        Priority Expected Expires Ordered    Voriconazole Level Routine 7/30/2018 7/25/2019 7/25/2018    Erythrocyte sedimentation rate auto Routine 7/30/2018 7/25/2019 7/25/2018    CRP inflammation Routine 7/30/2018 7/25/2019 7/25/2018    TSH with free T4 reflex Routine 7/30/2018 7/25/2019 7/25/2018    T3 total Routine 7/30/2018 7/25/2019 7/25/2018    Routine UA with micro - Reflex to culture Routine 7/30/2018 7/25/2019 7/25/2018    CMV DNA quantification Routine 7/30/2018 7/25/2019 7/25/2018    EBV DNA PCR Quantitative Whole Blood Routine 7/30/2018 7/25/2019 7/25/2018    Iron and iron binding capacity Routine 7/30/2018 7/25/2019 7/25/2018    Ferritin Routine 7/30/2018 7/25/2019 7/25/2018            Who to contact     If you have questions or need follow up information about today's clinic visit or your schedule please [facility] directly at [phone].  Normal or non-critical lab and imaging results will be communicated to you by [employer], letter or phone within 4 business days after the clinic has received the results. If you do not hear from us within 7 days, please contact the clinic through [employer] or phone. If you have a critical or abnormal lab result, we will notify you by phone as soon as possible.  Submit refill requests through AGELON ? or call your pharmacy and they will forward the refill request to us. Please allow 3 business days for your refill to be completed.          Additional Information About Your Visit        AGELON ? Information     AGELON ? gives you secure access to your electronic health record. If you see a primary care provider, you can also send messages to your care team and make appointments. If you have questions, please call your primary care clinic.  If you do not have a primary care provider, please call [phone] and  they will assist you.        Care EveryWhere ID     This is your Care EveryWhere ID. This could be used by other organizations to access your [place] medical records  [id number]        Your Vitals Were     Last Period                   06/01/1988 (Approximate)            Blood Pressure from Last 3 Encounters:   07/25/18 134/77   07/16/18 149/69   07/13/18 136/78    Weight from Last 3 Encounters:   07/25/18 78.2 kg (172 lb 4.8 oz)   07/16/18 78 kg (172 lb)   07/13/18 78.8 kg (173 lb 11.2 oz)              We Performed the Following     Comprehensive Sleep Study        Primary Care Provider Office Phone # Fax #    Jennifer Barraza [phone] [address]  [place] [facility] [address]        Equal Access to Services     [facility] : Hadii aad ku hadasho Soomaali, waaxda luqadaha, qaybta kaalmada adeegyada, etta lu . So [facility] [phone].    ATENCIÓN: Si habla español, tiene a jason disposición servicios gratuitos de asistencia lingüística. Jonas al [phone].    We comply with applicable federal civil rights laws and [place] laws. We do not discriminate on the basis of race, color, national origin, age, disability, sex, sexual orientation, or gender identity.            Thank you!     Thank you for choosing [facility] SLEEP STUDY  for your care. Our goal is always to provide you with excellent care. Hearing back from our patients is one way we can continue to improve our services. Please take a few minutes to complete the written survey that you may receive in the mail after your visit with us. Thank you!             Your Updated Medication List - Protect others around you: Learn how to safely use, store and throw away your medicines at www.disposemymeds.org.          This list is accurate as of 7/26/18  8:39 PM.  Always use your most recent med list.                   Brand Name Dispense Instructions for use Diagnosis    acetaminophen 500 MG Caps       Take 1,000 mg by mouth nightly as needed Take 500-1,000 mg by mouth every 6 hours if needed.        ARTIFICIAL TEARS OP      Apply 1 drop to eye as needed        BENEFIBER Powd      2 teaspoons daily        calcitRIOL 0.5 MCG capsule    ROCALTROL    90 capsule    Take 1 capsule (0.5 mcg) by mouth daily    Postablative hypoparathyroidism (H)       ciprofloxacin-dexamethasone otic suspension    CIPRODEX    5.6 mL    Place 4 drops Into the left ear 2 times daily for 14 days    Other infective chronic otitis externa of left ear, Tympanic membrane perforation, left, Sensorineural hearing loss (SNHL) of both ears       clotrimazole 1 % cream    LOTRIMIN     Apply topically 2 times daily as needed Reported on 4/25/2017        ELIQUIS 2.5 MG tablet   Generic drug:  apixaban ANTICOAGULANT      Take 2.5 mg by mouth 2 times daily        estradiol 0.1 MG/GM cream    ESTRACE VAGINAL    42.5 g    Place 2 g vaginally twice a week    Atrophic vaginitis       ezetimibe 10 MG tablet    ZETIA    30 tablet    Take 10 mg by mouth every evening        folic acid 1 MG tablet    FOLVITE    100 tablet    Take 1 tablet (1 mg) by mouth daily    Liver replaced by transplant (H)       furosemide 20 MG tablet    LASIX    46 tablet    Take 0.5 tablets (10 mg) by mouth daily    CKD (chronic kidney disease) stage 3, GFR 30-59 ml/min, Liver replaced by transplant (H)       hydrALAZINE 25 MG tablet    APRESOLINE    270 tablet    Take 0.5 tablets (12.5 mg) by mouth 3 times daily as needed , if systolic blood pressure is more than 140 mmHg.    HTN (hypertension)       ketoconazole 2 % cream    NIZORAL    15 g    Apply topically 2 times daily To angles of mouth    Angular cheilitis       LOVAZA 1 g capsule   Generic drug:  omega-3 acid ethyl esters     360 capsule    Take 1 capsule (1 g) by mouth 2 times daily    Hypertriglyceridemia       meclizine 25 MG tablet    ANTIVERT    30 tablet    Take 1 tablet (25 mg) by mouth as needed    Dizziness        metoprolol succinate 50 MG 24 hr tablet    TOPROL-XL    90 tablet    Take 1 tablet (50 mg) by mouth daily    Paroxysmal atrial fibrillation (H)       neomycin-polymyxin-hydrocortisone 3.5-84560-3 otic suspension    CORTISPORIN    10 mL    Place 4 drops Into the left ear 4 times daily    Acute swimmer's ear of left side       nitroFURantoin (macrocrystal-monohydrate) 100 MG capsule    MACROBID    14 capsule    Take 1 capsule (100 mg) by mouth 2 times daily For one week at onset of UTI symptoms    Urinary tract infection without hematuria, site unspecified       predniSONE 5 MG tablet    DELTASONE    90 tablet    Take 1 tablet (5 mg) by mouth daily    Thyroid cancer (H), Liver replaced by transplant (H)       PRESERVISION AREDS 2 Caps      Take 1 capsule by mouth 2 times daily        sertraline 50 MG tablet    ZOLOFT    90 tablet    Take 1 tablet (50 mg) by mouth daily    Adjustment disorder with depressed mood       sirolimus 0.5 MG Tabs tablet      Take 0.5 mg by mouth every other day        SUMAtriptan 50 MG tablet    IMITREX    30 tablet    Take 1 tablet (50 mg) by mouth at onset of headache for migraine repeat after 2 hours if needed.    Migraine without aura and without status migrainosus, not intractable       * SYNTHROID 150 MCG tablet   Generic drug:  levothyroxine      Take 225 mcg by mouth on Mondays        * levothyroxine 150 MCG tablet    SYNTHROID    120 tablet    Take 150mcg by mouth daily 6 days a week & 225mcg on Mondays    Malignant neoplasm of thyroid gland (H)       triamcinolone 0.1 % cream    KENALOG     Apply topically 2 times daily as needed for irritation        ursodiol 250 MG tablet    ACTIGALL    180 tablet    Take 1 tablet (250 mg) by mouth 2 times daily    Liver replaced by transplant (H)       voriconazole 200 MG tablet    VFEND    60 tablet    Take 1 tablet (200 mg) by mouth 2 times daily    Coccidioidal pneumonitis (H)       * Notice:  This list has 2 medication(s) that are the same as  other medications prescribed for you. Read the directions carefully, and ask your doctor or other care provider to review them with you.

## 2018-07-27 NOTE — PATIENT INSTRUCTIONS
[place] SLEEP [facility]    1. Your sleep study will be reviewed by a sleep physician within the next few days.     2. Please follow up in the sleep clinic as scheduled, or, make an appointment with your sleep provider to be seen within two weeks to discuss the results of the sleep study.    3. If you have any questions or problems with your treatment plan, please contact your sleep clinic provider at [phone] to further manage your condition.    4. Please review your attached medication list, and, at your follow-up appointment advise your sleep clinic provider about any changes.    5. Go to [email or web address] for more information about your sleep problems.    Brenton Prajapati, RPSGT  July 26, 2018

## 2018-07-30 ENCOUNTER — OFFICE VISIT (OUTPATIENT)
Dept: OTOLARYNGOLOGY | Facility: CLINIC | Age: 68
End: 2018-07-30
Payer: MEDICARE

## 2018-07-30 VITALS
BODY MASS INDEX: 25.76 KG/M2 | HEART RATE: 95 BPM | SYSTOLIC BLOOD PRESSURE: 173 MMHG | WEIGHT: 170 LBS | RESPIRATION RATE: 12 BRPM | DIASTOLIC BLOOD PRESSURE: 74 MMHG | OXYGEN SATURATION: 98 % | HEIGHT: 68 IN

## 2018-07-30 DIAGNOSIS — H72.92 TYMPANIC MEMBRANE PERFORATION, LEFT: ICD-10-CM

## 2018-07-30 DIAGNOSIS — B38.2 COCCIDIOIDAL PNEUMONITIS (H): ICD-10-CM

## 2018-07-30 DIAGNOSIS — Z94.4 LIVER REPLACED BY TRANSPLANT (H): ICD-10-CM

## 2018-07-30 DIAGNOSIS — H72.92 PERFORATION OF TYMPANIC MEMBRANE, LEFT: ICD-10-CM

## 2018-07-30 DIAGNOSIS — E11.22 TYPE 2 DIABETES MELLITUS WITH STAGE 3 CHRONIC KIDNEY DISEASE, WITHOUT LONG-TERM CURRENT USE OF INSULIN (H): ICD-10-CM

## 2018-07-30 DIAGNOSIS — R82.90 NONSPECIFIC FINDING ON EXAMINATION OF URINE: Primary | ICD-10-CM

## 2018-07-30 DIAGNOSIS — N18.30 TYPE 2 DIABETES MELLITUS WITH STAGE 3 CHRONIC KIDNEY DISEASE, WITHOUT LONG-TERM CURRENT USE OF INSULIN (H): ICD-10-CM

## 2018-07-30 DIAGNOSIS — H90.3 SENSORINEURAL HEARING LOSS (SNHL) OF BOTH EARS: ICD-10-CM

## 2018-07-30 DIAGNOSIS — E89.0 POSTOPERATIVE HYPOTHYROIDISM: ICD-10-CM

## 2018-07-30 DIAGNOSIS — Z79.899 HIGH RISK MEDICATION USE: ICD-10-CM

## 2018-07-30 DIAGNOSIS — H60.392 OTHER INFECTIVE CHRONIC OTITIS EXTERNA OF LEFT EAR: Primary | ICD-10-CM

## 2018-07-30 LAB
ALBUMIN SERPL-MCNC: 2.8 G/DL (ref 3.4–5)
ALBUMIN UR-MCNC: 30 MG/DL
ALP SERPL-CCNC: 276 U/L (ref 40–150)
ALT SERPL W P-5'-P-CCNC: 37 U/L (ref 0–50)
ANION GAP SERPL CALCULATED.3IONS-SCNC: 11 MMOL/L (ref 3–14)
APPEARANCE UR: CLEAR
AST SERPL W P-5'-P-CCNC: 25 U/L (ref 0–45)
BACTERIA #/AREA URNS HPF: ABNORMAL /HPF
BILIRUB DIRECT SERPL-MCNC: <0.1 MG/DL (ref 0–0.2)
BILIRUB SERPL-MCNC: 0.2 MG/DL (ref 0.2–1.3)
BILIRUB UR QL STRIP: NEGATIVE
BUN SERPL-MCNC: 26 MG/DL (ref 7–30)
CALCIUM SERPL-MCNC: 8.7 MG/DL (ref 8.5–10.1)
CHLORIDE SERPL-SCNC: 102 MMOL/L (ref 94–109)
CO2 SERPL-SCNC: 27 MMOL/L (ref 20–32)
COLOR UR AUTO: YELLOW
CREAT SERPL-MCNC: 1.11 MG/DL (ref 0.52–1.04)
CRP SERPL-MCNC: 5.2 MG/L (ref 0–8)
ERYTHROCYTE [DISTWIDTH] IN BLOOD BY AUTOMATED COUNT: 16 % (ref 10–15)
ERYTHROCYTE [SEDIMENTATION RATE] IN BLOOD BY WESTERGREN METHOD: 73 MM/H (ref 0–30)
FERRITIN SERPL-MCNC: 18 NG/ML (ref 8–252)
GFR SERPL CREATININE-BSD FRML MDRD: 49 ML/MIN/1.7M2
GLUCOSE SERPL-MCNC: 79 MG/DL (ref 70–99)
GLUCOSE UR STRIP-MCNC: NEGATIVE MG/DL
HCT VFR BLD AUTO: 33.8 % (ref 35–47)
HGB BLD-MCNC: 10.7 G/DL (ref 11.7–15.7)
HGB UR QL STRIP: NEGATIVE
IRON SATN MFR SERPL: 13 % (ref 15–46)
IRON SERPL-MCNC: 39 UG/DL (ref 35–180)
KETONES UR STRIP-MCNC: NEGATIVE MG/DL
LEUKOCYTE ESTERASE UR QL STRIP: ABNORMAL
MCH RBC QN AUTO: 27.9 PG (ref 26.5–33)
MCHC RBC AUTO-ENTMCNC: 31.7 G/DL (ref 31.5–36.5)
MCV RBC AUTO: 88 FL (ref 78–100)
NITRATE UR QL: NEGATIVE
NON-SQ EPI CELLS #/AREA URNS LPF: ABNORMAL /LPF
PH UR STRIP: 6 PH (ref 5–7)
PLATELET # BLD AUTO: 327 10E9/L (ref 150–450)
POTASSIUM SERPL-SCNC: 3.3 MMOL/L (ref 3.4–5.3)
PROT SERPL-MCNC: 7.2 G/DL (ref 6.8–8.8)
RBC # BLD AUTO: 3.84 10E12/L (ref 3.8–5.2)
RBC #/AREA URNS AUTO: ABNORMAL /HPF
SIROLIMUS BLD-MCNC: 3.1 UG/L (ref 5–15)
SODIUM SERPL-SCNC: 140 MMOL/L (ref 133–144)
SOURCE: ABNORMAL
SP GR UR STRIP: 1.01 (ref 1–1.03)
T3 SERPL-MCNC: 44 NG/DL (ref 60–181)
T4 FREE SERPL-MCNC: 1.02 NG/DL (ref 0.76–1.46)
TIBC SERPL-MCNC: 307 UG/DL (ref 240–430)
TME LAST DOSE: ABNORMAL H
TRANS CELLS #/AREA URNS HPF: ABNORMAL /HPF
TSH SERPL DL<=0.005 MIU/L-ACNC: 8.64 MU/L (ref 0.4–4)
UROBILINOGEN UR STRIP-ACNC: 0.2 EU/DL (ref 0.2–1)
WBC # BLD AUTO: 7.4 10E9/L (ref 4–11)
WBC #/AREA URNS AUTO: ABNORMAL /HPF

## 2018-07-30 PROCEDURE — 87799 DETECT AGENT NOS DNA QUANT: CPT | Performed by: INTERNAL MEDICINE

## 2018-07-30 PROCEDURE — 85652 RBC SED RATE AUTOMATED: CPT | Performed by: INTERNAL MEDICINE

## 2018-07-30 PROCEDURE — 80299 QUANTITATIVE ASSAY DRUG: CPT | Performed by: INTERNAL MEDICINE

## 2018-07-30 PROCEDURE — 84480 ASSAY TRIIODOTHYRONINE (T3): CPT | Performed by: INTERNAL MEDICINE

## 2018-07-30 PROCEDURE — 80076 HEPATIC FUNCTION PANEL: CPT | Performed by: INTERNAL MEDICINE

## 2018-07-30 PROCEDURE — 99213 OFFICE O/P EST LOW 20 MIN: CPT | Performed by: OTOLARYNGOLOGY

## 2018-07-30 PROCEDURE — 80048 BASIC METABOLIC PNL TOTAL CA: CPT | Performed by: INTERNAL MEDICINE

## 2018-07-30 PROCEDURE — 83550 IRON BINDING TEST: CPT | Performed by: INTERNAL MEDICINE

## 2018-07-30 PROCEDURE — 84439 ASSAY OF FREE THYROXINE: CPT | Performed by: INTERNAL MEDICINE

## 2018-07-30 PROCEDURE — 83540 ASSAY OF IRON: CPT | Performed by: INTERNAL MEDICINE

## 2018-07-30 PROCEDURE — 81001 URINALYSIS AUTO W/SCOPE: CPT | Performed by: INTERNAL MEDICINE

## 2018-07-30 PROCEDURE — 80195 ASSAY OF SIROLIMUS: CPT | Performed by: INTERNAL MEDICINE

## 2018-07-30 PROCEDURE — 87086 URINE CULTURE/COLONY COUNT: CPT | Performed by: INTERNAL MEDICINE

## 2018-07-30 PROCEDURE — 84443 ASSAY THYROID STIM HORMONE: CPT | Performed by: INTERNAL MEDICINE

## 2018-07-30 PROCEDURE — 82728 ASSAY OF FERRITIN: CPT | Performed by: INTERNAL MEDICINE

## 2018-07-30 PROCEDURE — 86140 C-REACTIVE PROTEIN: CPT | Performed by: INTERNAL MEDICINE

## 2018-07-30 PROCEDURE — 36415 COLL VENOUS BLD VENIPUNCTURE: CPT | Performed by: INTERNAL MEDICINE

## 2018-07-30 PROCEDURE — 85027 COMPLETE CBC AUTOMATED: CPT | Performed by: INTERNAL MEDICINE

## 2018-07-30 RX ORDER — CIPROFLOXACIN AND DEXAMETHASONE 3; 1 MG/ML; MG/ML
4 SUSPENSION/ DROPS AURICULAR (OTIC) 2 TIMES DAILY
Qty: 5.6 ML | Refills: 0 | Status: SHIPPED | OUTPATIENT
Start: 2018-07-30 | End: 2018-09-10

## 2018-07-30 NOTE — LETTER
7/30/2018         RE: Luz Thompson  [address]  [facility] [address]        Dear Colleague,    Thank you for referring your patient, Luz Thompson, to the [facility]. Please see a copy of my visit note below.    History of Present Illness - Luz Thompson is a 68 year old female here in close follow up from 7/16/18, to check on the resolution of a LEFT otitis externa and otitis media, with a perforation.    To review, she has had LEFT ear surgery with a tympanoplasty in 1996.  Otherwise no chronic ear disease.  She has also had thyroid surgery and ablation in March 2010.  The main reason she is here is progressive bilateral ear fullness and blockage of her hearing aids with cerumen.    At the March 2014 visit, she had been through quite an ordeal, having been hospitalized for E. Coli sepsis, the source was unknown. No new issues with that problem since then. And after asking her about it, it happened again this past June of 2015. Her past year had been fine, and other than fullness from her cerumen build up, no new ENT issues. No drainage from the ears, no bleeding, no vertigo. She still uses bilateral hearing aids.    About one week prior to the 6/29/18 visit, she noted some drainage from the LEFT ear.  She went to urgent care and was placed on some antibiotics.  Of note, she also got some pink eye.  However, she was not given drops because of a concern for drug interaction.  However, she was then placed on oral antibiotics.  On exam, the LEFT ear was completely filled with purulence, and cellulitic changes were starting in the external ear soft tissues and skin.  I treated her with debridement, ciprodex, and continued to the oral antibiotics.  Also, on exam after debridement I found a 10% perforation of the LEFT tympanic membrane.  At the follow up on 7/16, the infeciton was 90% cleared, but there was still some purulence on the LEFT tympanic membrane and the perforation  was unchanged.  Therefore I had her continue cirpodex and she is here for follow up.    We are hoping this resolves, as she leaves for [place] for the winter, in late September.    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     Prophylactic antibiotic     High risk medication use     Statin intolerance     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     Coccidioidal pneumonitis (H)     SNHL (sensorineural hearing loss)     Abnormal liver function tests     Diagnostic skin and sensitization tests     Perforation bowel (H)     Liver replaced by transplant (H)     Adjustment disorder with depressed mood     Type 2 diabetes, HbA1c goal < 7% (H)     Hyperlipidemia LDL goal <70     Hypertension goal BP (blood pressure) < 140/80     Postoperative hypothyroidism       Current Medications -   Current Outpatient Prescriptions:      acetaminophen 500 MG CAPS, Take 1,000 mg by mouth nightly as needed Take 500-1,000 mg by mouth every 6 hours if needed. , Disp: , Rfl:      calcitRIOL (ROCALTROL) 0.5 MCG capsule, Take 1 capsule (0.5 mcg) by mouth daily, Disp: 90 capsule, Rfl: 0     ciprofloxacin-dexamethasone (CIPRODEX) otic suspension, Place 4 drops Into the left ear 2 times daily for 14 days, Disp: 5.6 mL, Rfl: 0     clotrimazole (LOTRIMIN) 1 % cream, Apply topically 2 times daily as needed Reported on 4/25/2017, Disp: , Rfl:      ELIQUIS 2.5 MG tablet, Take 2.5 mg by mouth 2 times daily , Disp: , Rfl:      estradiol (ESTRACE VAGINAL) 0.1 MG/GM cream, Place 2 g vaginally twice a week, Disp: 42.5 g, Rfl: 11     ezetimibe (ZETIA) 10 MG tablet, Take 10 mg by mouth every evening , Disp: 30 tablet, Rfl: 0     folic acid (FOLVITE) 1 MG tablet, Take 1 tablet (1 mg) by mouth daily, Disp: 100 tablet, Rfl: 3     furosemide  (LASIX) 20 MG tablet, Take 0.5 tablets (10 mg) by mouth daily, Disp: 46 tablet, Rfl: 3     hydrALAZINE (APRESOLINE) 25 MG tablet, Take 0.5 tablets (12.5 mg) by mouth 3 times daily as needed , if systolic blood pressure is more than 140 mmHg., Disp: 270 tablet, Rfl: 3     Hypromellose (ARTIFICIAL TEARS OP), Apply 1 drop to eye as needed, Disp: , Rfl:      ketoconazole (NIZORAL) 2 % cream, Apply topically 2 times daily To angles of mouth, Disp: 15 g, Rfl: 0     levothyroxine (SYNTHROID) 150 MCG tablet, Take 150mcg by mouth daily 6 days a week & 225mcg on Mondays, Disp: 120 tablet, Rfl: 0     levothyroxine (SYNTHROID) 150 MCG tablet, Take 225 mcg by mouth on Mondays, Disp: , Rfl:      meclizine (ANTIVERT) 25 MG tablet, Take 1 tablet (25 mg) by mouth as needed, Disp: 30 tablet, Rfl: 11     metoprolol (TOPROL-XL) 50 MG 24 hr tablet, Take 1 tablet (50 mg) by mouth daily, Disp: 90 tablet, Rfl: 3     Multiple Vitamins-Minerals (PRESERVISION AREDS 2) CAPS, Take 1 capsule by mouth 2 times daily, Disp: , Rfl:      neomycin-polymyxin-hydrocortisone (CORTISPORIN) 3.5-41294-9 otic suspension, Place 4 drops Into the left ear 4 times daily, Disp: 10 mL, Rfl: 0     nitroFURantoin, macrocrystal-monohydrate, (MACROBID) 100 MG capsule, Take 1 capsule (100 mg) by mouth 2 times daily For one week at onset of UTI symptoms, Disp: 14 capsule, Rfl: 6     omega-3 acid ethyl esters (LOVAZA) 1 g capsule, Take 1 capsule (1 g) by mouth 2 times daily, Disp: 360 capsule, Rfl: 3     predniSONE (DELTASONE) 5 MG tablet, Take 1 tablet (5 mg) by mouth daily, Disp: 90 tablet, Rfl: 3     RAPAMUNE (BRAND) 0.5 MG PO TABLET, Take 0.5 mg by mouth every other day, Disp: , Rfl:      sertraline (ZOLOFT) 50 MG tablet, Take 1 tablet (50 mg) by mouth daily, Disp: 90 tablet, Rfl: 3     SUMAtriptan (IMITREX) 50 MG tablet, Take 1 tablet (50 mg) by mouth at onset of headache for migraine repeat after 2 hours if needed., Disp: 30 tablet, Rfl: 3     triamcinolone  "(KENALOG) 0.1 % cream, Apply topically 2 times daily as needed for irritation, Disp: , Rfl:      ursodiol (ACTIGALL) 250 MG tablet, Take 1 tablet (250 mg) by mouth 2 times daily, Disp: 180 tablet, Rfl: 3     voriconazole (VFEND) 200 MG tablet, Take 1 tablet (200 mg) by mouth 2 times daily, Disp: 60 tablet, Rfl: 2     Wheat Dextrin (BENEFIBER) POWD, 2 teaspoons daily, Disp: , Rfl:     Allergies -   Allergies   Allergen Reactions     Fluconazole Hives and Itching     Ciprofloxacin Anxiety and Other (See Comments)     Irregular heart beat     Azithromycin Itching     Benadryl [Diphenhydramine Hcl]      Insomnia      Cellcept Diarrhea     Ciprofloxacin Other (See Comments)     Insomnia, mood lability     Codeine      Psych disturbance     Codeine      Diphenhydramine Other (See Comments)     Doxycycline      Lansoprazole Diarrhea     Levaquin [Levofloxacin] Other (See Comments)     Headache, hyperactivity     Lisinopril Cough     Methotrexate      Sores     Methotrexate      Morphine Sulfate Itching     Mycophenolate Diarrhea     No Clinical Screening - See Comments      Simvastatin Muscle Pain (Myalgia)     severe     Cephalexin Rash     Fever and skin burning     Penicillin G Itching and Rash     Tolectin [Nsaids] Rash     Tramadol Rash       Social History -   Social History     Social History     Marital status: Legally      Spouse name: Robbin Thompson     Number of children: 4     Years of education: 20     Occupational History     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 She and her  travel around the [place] in an [place]. [place] course of the [place]. 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       Family History -   Family History   Problem Relation Age of Onset     Hypertension Mother      Endometrial Cancer Mother      Hyperlipidemia Mother      Prostate Cancer Father      Macular Degeneration Father      Cancer - colorectal Maternal Grandmother      in her 80's, has surgery and removal of part of kidney,  at age 98     HEART DISEASE Maternal Grandfather       at 98     Glaucoma Maternal Grandfather      Cerebrovascular Disease Paternal Grandmother      in her 80's     Hypertension Paternal Grandmother      HEART DISEASE Paternal Grandfather      MI     Alzheimer Disease Paternal Grandfather      Allergies Son      Neurologic Disorder Daughter      Migraines     Breast Cancer Other      Anesthesia Reaction No family hx of      Crohn Disease No family hx of      Ulcerative Colitis No family hx of        Review of Systems - As per HPI and PMHx, otherwise 10+ system review of the head and neck, and general constitution is negative.    Physical Exam  /74  Pulse 95  Resp 12  Ht 1.715 m (5' 7.5\")  Wt 77.1 kg (170 lb)  LMP 1988 (Approximate)  SpO2 98%  BMI 26.23 kg/m2    General - The patient is well nourished and well developed, and appears to have good nutritional status.  Alert and oriented to person and place, answers questions and cooperates with examination appropriately.   Head and Face - Normocephalic and atraumatic, with no gross asymmetry noted of the contour of the facial features.  The facial nerve is intact, with strong symmetric movements.  Voice and Breathing - The patient was breathing comfortably without the use of " accessory muscles. There was no wheezing, stridor, or stertor.  The patients voice was clear and strong, and had appropriate pitch and quality.  Ears - The RIGHT ear and RIGHT tympanic membrane are healthy and normal.  The LEFT ear canal is dramatically better, clear of any effusion and the LEFT tympanic membrane is significantly more thin and translucent.  There is still some debris on it.  The perforation is definitely smaller, perhaps 5 % now.  Eyes - Extraocular movements intact, and the pupils were reactive to light.  Sclera were not icteric or injected, conjunctiva were pink and moist.        A/P - Luz Thompson is a 68 year old female  (H60.392) Other infective chronic otitis externa of left ear  (primary encounter diagnosis)  (H72.92) Tympanic membrane perforation, left  (H90.3) Sensorineural hearing loss (SNHL) of both ears    There is great improvement, and the perforation appears to be shrinking as well.  I will stop drops, but have prescribed her another bottle just in case.       See me in one month, and hopefully things will be clear, and her winter travels and wintering in [place] will be fine.    Again, thank you for allowing me to participate in the care of your patient.        Sincerely,        Randell Mejia MD

## 2018-07-30 NOTE — PATIENT INSTRUCTIONS
Scheduling Information  To schedule your CT/MRI scan, please contact [facility] at [phone] OR [facility] at [phone]    To schedule your Surgery, please contact our Specialty Schedulers at [phone]      ENT Clinic Locations Clinic Hours Telephone Number     [facility]  [address] [place] [address]   Monday:           1:00pm -- 5:00pm    Friday:              8:00am - 12:00pm   To schedule/reschedule an appointment with   Dr. Mejia,   please contact our   Specialty Scheduling Department at:     [phone]       [facility]  [address] [address] Tuesday:          8:00am -- 2:00pm         Urgent Care Locations Clinic Hours Telephone Numbers     [facility]  [address] [place] [address]     Monday-Friday:     11:00am - 9:00pm    Saturday-Sunday:  9:00am - 5:00pm   [phone]     [facility]  [address] [place] [address]     Monday-Friday:      5:00pm - 9:00pm     Saturday-Sunday:  9:00am - 5:00pm   [phone]

## 2018-07-30 NOTE — PROGRESS NOTES
History of Present Illness - Luz Thompson is a 68 year old female here in close follow up from 7/16/18, to check on the resolution of a LEFT otitis externa and otitis media, with a perforation.    To review, she has had LEFT ear surgery with a tympanoplasty in 1996.  Otherwise no chronic ear disease.  She has also had thyroid surgery and ablation in March 2010.  The main reason she is here is progressive bilateral ear fullness and blockage of her hearing aids with cerumen.    At the March 2014 visit, she had been through quite an ordeal, having been hospitalized for E. Coli sepsis, the source was unknown. No new issues with that problem since then. And after asking her about it, it happened again this past June of 2015. Her past year had been fine, and other than fullness from her cerumen build up, no new ENT issues. No drainage from the ears, no bleeding, no vertigo. She still uses bilateral hearing aids.    About one week prior to the 6/29/18 visit, she noted some drainage from the LEFT ear.  She went to urgent care and was placed on some antibiotics.  Of note, she also got some pink eye.  However, she was not given drops because of a concern for drug interaction.  However, she was then placed on oral antibiotics.  On exam, the LEFT ear was completely filled with purulence, and cellulitic changes were starting in the external ear soft tissues and skin.  I treated her with debridement, ciprodex, and continued to the oral antibiotics.  Also, on exam after debridement I found a 10% perforation of the LEFT tympanic membrane.  At the follow up on 7/16, the infeciton was 90% cleared, but there was still some purulence on the LEFT tympanic membrane and the perforation was unchanged.  Therefore I had her continue cirpodex and she is here for follow up.    We are hoping this resolves, as she leaves for [place] for the winter, in late September.    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     Prophylactic antibiotic     High risk medication use     Statin intolerance     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     Coccidioidal pneumonitis (H)     SNHL (sensorineural hearing loss)     Abnormal liver function tests     Diagnostic skin and sensitization tests     Perforation bowel (H)     Liver replaced by transplant (H)     Adjustment disorder with depressed mood     Type 2 diabetes, HbA1c goal < 7% (H)     Hyperlipidemia LDL goal <70     Hypertension goal BP (blood pressure) < 140/80     Postoperative hypothyroidism       Current Medications -   Current Outpatient Prescriptions:      acetaminophen 500 MG CAPS, Take 1,000 mg by mouth nightly as needed Take 500-1,000 mg by mouth every 6 hours if needed. , Disp: , Rfl:      calcitRIOL (ROCALTROL) 0.5 MCG capsule, Take 1 capsule (0.5 mcg) by mouth daily, Disp: 90 capsule, Rfl: 0     ciprofloxacin-dexamethasone (CIPRODEX) otic suspension, Place 4 drops Into the left ear 2 times daily for 14 days, Disp: 5.6 mL, Rfl: 0     clotrimazole (LOTRIMIN) 1 % cream, Apply topically 2 times daily as needed Reported on 4/25/2017, Disp: , Rfl:      ELIQUIS 2.5 MG tablet, Take 2.5 mg by mouth 2 times daily , Disp: , Rfl:      estradiol (ESTRACE VAGINAL) 0.1 MG/GM cream, Place 2 g vaginally twice a week, Disp: 42.5 g, Rfl: 11     ezetimibe (ZETIA) 10 MG tablet, Take 10 mg by mouth every evening , Disp: 30 tablet, Rfl: 0     folic acid (FOLVITE) 1 MG tablet, Take 1 tablet (1 mg) by mouth daily, Disp: 100 tablet, Rfl: 3     furosemide (LASIX) 20 MG tablet, Take 0.5 tablets (10 mg) by mouth daily, Disp: 46 tablet, Rfl: 3     hydrALAZINE (APRESOLINE) 25 MG tablet, Take 0.5 tablets (12.5 mg) by mouth 3 times daily as needed , if systolic blood pressure is more than 140 mmHg., Disp:  270 tablet, Rfl: 3     Hypromellose (ARTIFICIAL TEARS OP), Apply 1 drop to eye as needed, Disp: , Rfl:      ketoconazole (NIZORAL) 2 % cream, Apply topically 2 times daily To angles of mouth, Disp: 15 g, Rfl: 0     levothyroxine (SYNTHROID) 150 MCG tablet, Take 150mcg by mouth daily 6 days a week & 225mcg on Mondays, Disp: 120 tablet, Rfl: 0     levothyroxine (SYNTHROID) 150 MCG tablet, Take 225 mcg by mouth on Mondays, Disp: , Rfl:      meclizine (ANTIVERT) 25 MG tablet, Take 1 tablet (25 mg) by mouth as needed, Disp: 30 tablet, Rfl: 11     metoprolol (TOPROL-XL) 50 MG 24 hr tablet, Take 1 tablet (50 mg) by mouth daily, Disp: 90 tablet, Rfl: 3     Multiple Vitamins-Minerals (PRESERVISION AREDS 2) CAPS, Take 1 capsule by mouth 2 times daily, Disp: , Rfl:      neomycin-polymyxin-hydrocortisone (CORTISPORIN) 3.5-56786-5 otic suspension, Place 4 drops Into the left ear 4 times daily, Disp: 10 mL, Rfl: 0     nitroFURantoin, macrocrystal-monohydrate, (MACROBID) 100 MG capsule, Take 1 capsule (100 mg) by mouth 2 times daily For one week at onset of UTI symptoms, Disp: 14 capsule, Rfl: 6     omega-3 acid ethyl esters (LOVAZA) 1 g capsule, Take 1 capsule (1 g) by mouth 2 times daily, Disp: 360 capsule, Rfl: 3     predniSONE (DELTASONE) 5 MG tablet, Take 1 tablet (5 mg) by mouth daily, Disp: 90 tablet, Rfl: 3     RAPAMUNE (BRAND) 0.5 MG PO TABLET, Take 0.5 mg by mouth every other day, Disp: , Rfl:      sertraline (ZOLOFT) 50 MG tablet, Take 1 tablet (50 mg) by mouth daily, Disp: 90 tablet, Rfl: 3     SUMAtriptan (IMITREX) 50 MG tablet, Take 1 tablet (50 mg) by mouth at onset of headache for migraine repeat after 2 hours if needed., Disp: 30 tablet, Rfl: 3     triamcinolone (KENALOG) 0.1 % cream, Apply topically 2 times daily as needed for irritation, Disp: , Rfl:      ursodiol (ACTIGALL) 250 MG tablet, Take 1 tablet (250 mg) by mouth 2 times daily, Disp: 180 tablet, Rfl: 3     voriconazole (VFEND) 200 MG tablet, Take 1  "tablet (200 mg) by mouth 2 times daily, Disp: 60 tablet, Rfl: 2     Wheat Dextrin (BENEFIBER) POWD, 2 teaspoons daily, Disp: , Rfl:     Allergies -   Allergies   Allergen Reactions     Fluconazole Hives and Itching     Ciprofloxacin Anxiety and Other (See Comments)     Irregular heart beat     Azithromycin Itching     Benadryl [Diphenhydramine Hcl]      Insomnia      Cellcept Diarrhea     Ciprofloxacin Other (See Comments)     Insomnia, mood lability     Codeine      Psych disturbance     Codeine      Diphenhydramine Other (See Comments)     Doxycycline      Lansoprazole Diarrhea     Levaquin [Levofloxacin] Other (See Comments)     Headache, hyperactivity     Lisinopril Cough     Methotrexate      Sores     Methotrexate      Morphine Sulfate Itching     Mycophenolate Diarrhea     No Clinical Screening - See Comments      Simvastatin Muscle Pain (Myalgia)     severe     Cephalexin Rash     Fever and skin burning     Penicillin G Itching and Rash     Tolectin [Nsaids] Rash     Tramadol Rash       Social History -   Social History     Social History     Marital status: Legally      Spouse name: Robbin Thompson     Number of children: 4     Years of education: 20     Occupational History     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 She and her  travel around the [place] in an [place]. [place] the course " "of the winter. 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       Family History -   Family History   Problem Relation Age of Onset     Hypertension Mother      Endometrial Cancer Mother      Hyperlipidemia Mother      Prostate Cancer Father      Macular Degeneration Father      Cancer - colorectal Maternal Grandmother      in her 80's, has surgery and removal of part of kidney,  at age 98     HEART DISEASE Maternal Grandfather       at 98     Glaucoma Maternal Grandfather      Cerebrovascular Disease Paternal Grandmother      in her 80's     Hypertension Paternal Grandmother      HEART DISEASE Paternal Grandfather      MI     Alzheimer Disease Paternal Grandfather      Allergies Son      Neurologic Disorder Daughter      Migraines     Breast Cancer Other      Anesthesia Reaction No family hx of      Crohn Disease No family hx of      Ulcerative Colitis No family hx of        Review of Systems - As per HPI and PMHx, otherwise 10+ system review of the head and neck, and general constitution is negative.    Physical Exam  /74  Pulse 95  Resp 12  Ht 1.715 m (5' 7.5\")  Wt 77.1 kg (170 lb)  LMP 1988 (Approximate)  SpO2 98%  BMI 26.23 kg/m2    General - The patient is well nourished and well developed, and appears to have good nutritional status.  Alert and oriented to person and place, answers questions and cooperates with examination appropriately.   Head and Face - Normocephalic and atraumatic, with no gross asymmetry noted of the contour of the facial features.  The facial nerve is intact, with strong symmetric movements.  Voice and Breathing - The patient was breathing comfortably without the use of accessory muscles. There was no wheezing, stridor, or stertor.  The patients voice was clear and strong, and had appropriate pitch and quality.  Ears - The RIGHT ear and RIGHT tympanic membrane are healthy and normal.  The LEFT ear canal is " dramatically better, clear of any effusion and the LEFT tympanic membrane is significantly more thin and translucent.  There is still some debris on it.  The perforation is definitely smaller, perhaps 5 % now.  Eyes - Extraocular movements intact, and the pupils were reactive to light.  Sclera were not icteric or injected, conjunctiva were pink and moist.        A/P - Luz Thompson is a 68 year old female  (H60.392) Other infective chronic otitis externa of left ear  (primary encounter diagnosis)  (H72.92) Tympanic membrane perforation, left  (H90.3) Sensorineural hearing loss (SNHL) of both ears    There is great improvement, and the perforation appears to be shrinking as well.  I will stop drops, but have prescribed her another bottle just in case.       See me in one month, and hopefully things will be clear, and her winter travels and wintering in [place] will be fine.

## 2018-07-30 NOTE — MR AVS SNAPSHOT
After Visit Summary   7/30/2018    Luz Thompson    MRN: [id number]           Patient Information     Date Of Birth          1950        Visit Information        Provider Department      7/30/2018 1:00 PM Randell Mejia MD [facility]        Today's Diagnoses     Other infective chronic otitis externa of left ear    -  1    Perforation of tympanic membrane, left        Sensorineural hearing loss (SNHL) of both ears        Tympanic membrane perforation, left          Care Instructions    Scheduling Information  To schedule your CT/MRI scan, please contact [facility] at [phone] OR [facility] at [phone]    To schedule your Surgery, please contact our Specialty Schedulers at [phone]      ENT Clinic Locations Clinic Hours Telephone Number     [facility]  [address] [address]   Monday:           1:00pm -- 5:00pm    Friday:              8:00am - 12:00pm   To schedule/reschedule an appointment with   Dr. Mejia,   please contact our   Specialty Scheduling Department at:     [phone]       [facility]  [address] [place] [address] Tuesday:          8:00am -- 2:00pm         Urgent Care Locations Clinic Hours Telephone Numbers     [facility]  [address] [place] [address]     Monday-Friday:     11:00am - 9:00pm    Saturday-Sunday:  9:00am - 5:00pm   [phone]     [facility]  [address] [place] [address]     Monday-Friday:      5:00pm - 9:00pm     Saturday-Sunday:  9:00am - 5:00pm   [phone]                 Follow-ups after your visit        Your next 10 appointments already scheduled     Aug 06, 2018  9:00 AM CDT   (Arrive by 8:45 AM)   RETURN ENDOCRINE with Rach Vasquez MD   [facility] Endocrinology ([facility])    [address] [place] [id number] [phone]            Aug 13, 2018  1:00  PM CDT   Return Sleep Patient with Vero Quiñonez MD   [facility] [place] Sleep Study ([facility])    [address] [place] [id number] [phone]            Aug 15, 2018 11:30 AM CDT   (Arrive by 11:15 AM)   RETURN ATRIAL FIBULATION VISIT with LIZ Sauceda CNP   [facility] ([facility])    [address] [place] [address]  [address]  [facility] [address] [phone]            Sep 14, 2018 10:00 AM CDT   CT CHEST W/O CONTRAST with UCCT2   [facility] ([facility])    [address]  1st Floor  [facility] [address] [phone]           Please bring any scans or X-rays taken at other hospitals, if similar tests were done. Also bring a list of your medicines, including vitamins, minerals and over-the-counter drugs. It is safest to leave personal items at home.  Be sure to tell your doctor:   If you have any allergies.   If there s any chance you are pregnant.   If you are breastfeeding.  You do not need to do anything special to prepare for this exam.  Please wear loose clothing, such as a sweat suit or jogging clothes. Avoid snaps, zippers and other metal. We may ask you to undress and put on a hospital gown.            Sep 14, 2018 11:00 AM CDT   (Arrive by 10:45 AM)   Return Visit with LIZ Gamboa CNP [facility] ([facility])    [address]  [address]  [facility] [address] [phone]            May 15, 2019 10:30 AM CDT   (Arrive by 10:15 AM)   Return Visit with Crystal Montero MD   [facility] ([facility])    [address] [place] [address]  [address]  [facility] [address] [phone]              Who to contact     If you have questions or need follow up information about today's clinic visit or  "your schedule please contact [facility] directly at [phone].  Normal or non-critical lab and imaging results will be communicated to you by MyChart, letter or phone within 4 business days after the clinic has received the results. If you do not hear from us within 7 days, please contact the clinic through [employer] or phone. If you have a critical or abnormal lab result, we will notify you by phone as soon as possible.  Submit refill requests through [employer] or call your pharmacy and they will forward the refill request to us. Please allow 3 business days for your refill to be completed.          Additional Information About Your Visit        [employer] Information     [employer] gives you secure access to your electronic health record. If you see a primary care provider, you can also send messages to your care team and make appointments. If you have questions, please call your primary care clinic.  If you do not have a primary care provider, please call [phone] and they will assist you.        Care EveryWhere ID     This is your Care EveryWhere ID. This could be used by other organizations to access your [place] medical records  [id number]        Your Vitals Were     Pulse Respirations Height Last Period Pulse Oximetry BMI (Body Mass Index)    95 12 1.715 m (5' 7.5\") 06/01/1988 (Approximate) 98% 26.23 kg/m2       Blood Pressure from Last 3 Encounters:   07/30/18 173/74   07/25/18 134/77   07/16/18 149/69    Weight from Last 3 Encounters:   07/30/18 77.1 kg (170 lb)   07/25/18 78.2 kg (172 lb 4.8 oz)   07/16/18 78 kg (172 lb)              Today, you had the following     No orders found for display         Where to get your medicines      These medications were sent to [id number] - [place] - [address]  [address] [address]    Hours:  test fax successful 4/5/04 kr Phone:  [phone]     ciprofloxacin-dexamethasone otic suspension          " Primary Care Provider Office Phone # Fax #    Jennifer Amparo Barraza [phone] [address]  [place] [facility] [address]        Equal Access to Services     GENE NOWAK : Hadii brayan ku hadnieshao Soomaali, waaxda luqadaha, qaybta kaalmada adeegyada, etta coley laInésaugustus hardy. So [facility] [phone].    ATENCIÓN: Si habla español, tiene a jason disposición servicios gratuitos de asistencia lingüística. Llame al [phone].    We comply with applicable federal civil rights laws and [place] laws. We do not discriminate on the basis of race, color, national origin, age, disability, sex, sexual orientation, or gender identity.            Thank you!     Thank you for choosing [facility]  for your care. Our goal is always to provide you with excellent care. Hearing back from our patients is one way we can continue to improve our services. Please take a few minutes to complete the written survey that you may receive in the mail after your visit with us. Thank you!             Your Updated Medication List - Protect others around you: Learn how to safely use, store and throw away your medicines at www.disposemymeds.org.          This list is accurate as of 7/30/18  1:22 PM.  Always use your most recent med list.                   Brand Name Dispense Instructions for use Diagnosis    acetaminophen 500 MG Caps      Take 1,000 mg by mouth nightly as needed Take 500-1,000 mg by mouth every 6 hours if needed.        ARTIFICIAL TEARS OP      Apply 1 drop to eye as needed        BENEFIBER Powd      2 teaspoons daily        calcitRIOL 0.5 MCG capsule    ROCALTROL    90 capsule    Take 1 capsule (0.5 mcg) by mouth daily    Postablative hypoparathyroidism (H)       ciprofloxacin-dexamethasone otic suspension    CIPRODEX    5.6 mL    Place 4 drops Into the left ear 2 times daily    Other infective chronic otitis externa of left ear, Tympanic membrane perforation, left, Sensorineural hearing  loss (SNHL) of both ears       clotrimazole 1 % cream    LOTRIMIN     Apply topically 2 times daily as needed Reported on 4/25/2017        ELIQUIS 2.5 MG tablet   Generic drug:  apixaban ANTICOAGULANT      Take 2.5 mg by mouth 2 times daily        estradiol 0.1 MG/GM cream    ESTRACE VAGINAL    42.5 g    Place 2 g vaginally twice a week    Atrophic vaginitis       ezetimibe 10 MG tablet    ZETIA    30 tablet    Take 10 mg by mouth every evening        folic acid 1 MG tablet    FOLVITE    100 tablet    Take 1 tablet (1 mg) by mouth daily    Liver replaced by transplant (H)       furosemide 20 MG tablet    LASIX    46 tablet    Take 0.5 tablets (10 mg) by mouth daily    CKD (chronic kidney disease) stage 3, GFR 30-59 ml/min, Liver replaced by transplant (H)       hydrALAZINE 25 MG tablet    APRESOLINE    270 tablet    Take 0.5 tablets (12.5 mg) by mouth 3 times daily as needed , if systolic blood pressure is more than 140 mmHg.    HTN (hypertension)       ketoconazole 2 % cream    NIZORAL    15 g    Apply topically 2 times daily To angles of mouth    Angular cheilitis       LOVAZA 1 g capsule   Generic drug:  omega-3 acid ethyl esters     360 capsule    Take 1 capsule (1 g) by mouth 2 times daily    Hypertriglyceridemia       meclizine 25 MG tablet    ANTIVERT    30 tablet    Take 1 tablet (25 mg) by mouth as needed    Dizziness       metoprolol succinate 50 MG 24 hr tablet    TOPROL-XL    90 tablet    Take 1 tablet (50 mg) by mouth daily    Paroxysmal atrial fibrillation (H)       neomycin-polymyxin-hydrocortisone 3.5-96533-4 otic suspension    CORTISPORIN    10 mL    Place 4 drops Into the left ear 4 times daily    Acute swimmer's ear of left side       nitroFURantoin (macrocrystal-monohydrate) 100 MG capsule    MACROBID    14 capsule    Take 1 capsule (100 mg) by mouth 2 times daily For one week at onset of UTI symptoms    Urinary tract infection without hematuria, site unspecified       predniSONE 5 MG tablet     DELTASONE    90 tablet    Take 1 tablet (5 mg) by mouth daily    Thyroid cancer (H), Liver replaced by transplant (H)       PRESERVISION AREDS 2 Caps      Take 1 capsule by mouth 2 times daily        sertraline 50 MG tablet    ZOLOFT    90 tablet    Take 1 tablet (50 mg) by mouth daily    Adjustment disorder with depressed mood       sirolimus 0.5 MG Tabs tablet      Take 0.5 mg by mouth every other day        SUMAtriptan 50 MG tablet    IMITREX    30 tablet    Take 1 tablet (50 mg) by mouth at onset of headache for migraine repeat after 2 hours if needed.    Migraine without aura and without status migrainosus, not intractable       * SYNTHROID 150 MCG tablet   Generic drug:  levothyroxine      Take 225 mcg by mouth on Mondays        * levothyroxine 150 MCG tablet    SYNTHROID    120 tablet    Take 150mcg by mouth daily 6 days a week & 225mcg on Mondays    Malignant neoplasm of thyroid gland (H)       triamcinolone 0.1 % cream    KENALOG     Apply topically 2 times daily as needed for irritation        ursodiol 250 MG tablet    ACTIGALL    180 tablet    Take 1 tablet (250 mg) by mouth 2 times daily    Liver replaced by transplant (H)       voriconazole 200 MG tablet    VFEND    60 tablet    Take 1 tablet (200 mg) by mouth 2 times daily    Coccidioidal pneumonitis (H)       * Notice:  This list has 2 medication(s) that are the same as other medications prescribed for you. Read the directions carefully, and ask your doctor or other care provider to review them with you.

## 2018-07-31 LAB
BACTERIA SPEC CULT: NORMAL
CMV DNA SPEC NAA+PROBE-ACNC: NORMAL [IU]/ML
CMV DNA SPEC NAA+PROBE-LOG#: NORMAL {LOG_IU}/ML
SPECIMEN SOURCE: NORMAL
SPECIMEN SOURCE: NORMAL
VORICONAZOLE SERPL-MCNC: 1.3 UG/ML

## 2018-08-01 LAB
EBV DNA # SPEC NAA+PROBE: 2166 {COPIES}/ML
EBV DNA SPEC NAA+PROBE-LOG#: 3.3 {LOG_COPIES}/ML
SLPCOMP: NORMAL

## 2018-08-03 ENCOUNTER — TELEPHONE (OUTPATIENT)
Dept: TRANSPLANT | Facility: CLINIC | Age: 68
End: 2018-08-03

## 2018-08-03 DIAGNOSIS — E78.5 HYPERLIPIDEMIA LDL GOAL <70: Primary | ICD-10-CM

## 2018-08-03 DIAGNOSIS — Z94.4 LIVER REPLACED BY TRANSPLANT (H): Primary | ICD-10-CM

## 2018-08-03 DIAGNOSIS — I10 BENIGN ESSENTIAL HYPERTENSION: ICD-10-CM

## 2018-08-03 DIAGNOSIS — Z94.4 LIVER REPLACED BY TRANSPLANT (H): ICD-10-CM

## 2018-08-03 DIAGNOSIS — N18.30 CKD (CHRONIC KIDNEY DISEASE) STAGE 3, GFR 30-59 ML/MIN (H): ICD-10-CM

## 2018-08-03 RX ORDER — EZETIMIBE 10 MG/1
TABLET ORAL
Qty: 90 TABLET | Refills: 3 | Status: SHIPPED | OUTPATIENT
Start: 2018-08-03 | End: 2019-08-07

## 2018-08-03 RX ORDER — FUROSEMIDE 20 MG
TABLET ORAL
Qty: 45 TABLET | Refills: 3 | Status: SHIPPED | OUTPATIENT
Start: 2018-08-03 | End: 2019-08-05

## 2018-08-03 RX ORDER — SIROLIMUS 1 MG
1 TABLET ORAL EVERY OTHER DAY
Qty: 45 TABLET | Refills: 3 | Status: SHIPPED | OUTPATIENT
Start: 2018-08-03 | End: 2019-05-28

## 2018-08-06 ENCOUNTER — OFFICE VISIT (OUTPATIENT)
Dept: ENDOCRINOLOGY | Facility: CLINIC | Age: 68
End: 2018-08-06
Payer: MEDICARE

## 2018-08-06 VITALS
DIASTOLIC BLOOD PRESSURE: 81 MMHG | SYSTOLIC BLOOD PRESSURE: 146 MMHG | WEIGHT: 174.5 LBS | HEIGHT: 68 IN | BODY MASS INDEX: 26.45 KG/M2 | HEART RATE: 66 BPM

## 2018-08-06 DIAGNOSIS — C73 PAPILLARY THYROID CARCINOMA (H): ICD-10-CM

## 2018-08-06 DIAGNOSIS — E83.51 HYPOCALCEMIA: ICD-10-CM

## 2018-08-06 DIAGNOSIS — M81.0 AGE-RELATED OSTEOPOROSIS WITHOUT CURRENT PATHOLOGICAL FRACTURE: Primary | ICD-10-CM

## 2018-08-06 DIAGNOSIS — N18.30 TYPE 2 DIABETES MELLITUS WITH STAGE 3 CHRONIC KIDNEY DISEASE, WITHOUT LONG-TERM CURRENT USE OF INSULIN (H): ICD-10-CM

## 2018-08-06 DIAGNOSIS — R80.8 OTHER PROTEINURIA: ICD-10-CM

## 2018-08-06 DIAGNOSIS — E89.0 POSTOPERATIVE HYPOTHYROIDISM: ICD-10-CM

## 2018-08-06 DIAGNOSIS — E11.22 TYPE 2 DIABETES MELLITUS WITH STAGE 3 CHRONIC KIDNEY DISEASE, WITHOUT LONG-TERM CURRENT USE OF INSULIN (H): ICD-10-CM

## 2018-08-06 NOTE — PROGRESS NOTES
The patient returns to the clinic for follow-up of papillary thyroid cancer, hypocalcemia.     Ms Thompson is a 68 year old woman s/p 5/22/02 orthotopic liver transplant for primary biliary cirrhosis, on immunosuppression treatment (rapamune, prednisone).     She reports going through significant stress, as her  is filing for divorce.  She continues to spend the cleary in [place], from October until May.  Currently, she undergoes treatment with voriconazole.  A biopsy of the lung was inconclusive and she is scheduled to have a follow-up CT in September.  Today, she did not take her morning blood pressure medications.    1. Papillary thyroid cancer, S/P total thyroidectomy and central neck dissection on March 2nd, 2010. Follicular variant, solitary, 3 cm in greatest diameter, with no capsular invasion and negative central neck lymph nodes (0/18 lymph nodes).  MACIS score 5.78.     The WBS done on 12/9/2010 revealed 3 vaque foci of uptake in the L neck. Total iodine uptake was 0.2%.   She underwent radioablation with 159.2 mCi I 131, on 1/27/2010. The WBS done 1 week post treatment revealed 3 foci of uptake in the L neck, which were previously seen on the pretreatment scan.     11/5/12 neck US - normal appearing lymph nodes levels 2 B/L and 3 LN at left level 3   6/7/12 posttreatment WBS - negative   6/25/13 neck US - didn't identify lymph nodes with features suspicious of malignancy. A right level II lymph node appeared to be mildly larger compared with prior images from 2010. Two left level III lymph nodes were again noted, of normal size.   5/19/14 and 6/14/2016 neck USs  - no definite suspicious looking lymph nodes    Thyroglobulin antibodies were negative. I reviewed prior tyroglobulin levels:  5/5/10         0.21          TSH 7.48   10/25/10     <0.1          TSH 2.92  12/08/10      8.3             12/10/10      3.1           TSH 58.6   8/4/11         <0.1          TSH 0.03  6/8/12         <0.1           TSH 75.1  6/25/13       <0.1          TSH 12.8  3/5/14         <0.1          TSH 6.32   4/15/15       <0.1          TSH 2.4  5/24/16       <0.1          TSH 3.65     She continues to take 150  g levothyroxine daily 6 days a week and 1 1/2 tablets one day a week.  She takes the levothyroxine 30 minutes before breakfast and she does not have soy products with breakfast.    2. Hypocalcemia - requiring treatment with 0.5 mcg calcitriol daily, in the context of CKD with GFR in the 40s.     Most recent corrected calcium level was 9.7, this month. Last PTH level was normal at 60, in 2016.    In a regular day, she drinks 2-3 glasses of milk and she takes an eye multivitamin.  She denies taking vitamin D or calcium supplements.    3.  Osteoporosis, not previously treated with antiresorptive drugs, due to impaired GFR.  Most recent DEXA scan from 2015 revealed a significant improvement of bone mineral density at the lumbar spine and mean hip (mainly right femoral neck).  The most negative T score was -2.8, at the right femoral neck.  She denies any falls or fractures since her last visit here.  The dose of prednisone has remained unchanged, 5 mg daily.  In terms of physical exercise, she works out 2 times a week, with a , for approximately 1 hour (post stretching and weight exercises).  She has not been walking as much as she used to.    4. Type 2 diabetes   This is a new diagnosis. A1c was 6.8%, in May this year.  Her  has type 1 diabetes.  She denies blurred vision, the last eye exam was a couple of weeks ago and the patient denies being told that she has eye changes suggestive of diabetes. She has galucoma and sees olpthamology regularly.   For the last couple of months, she has noticed numbness and tingling sensation in her toes.  Urine microalbumin has been positive since 2014.     5.  Mixed dyslipidemia  She follows up with cardiology.  She was not able to tolerate statins due to joint pain.   Currently, she is medicated with Zetia and Lovaza, 2 g twice daily (she only tolerates 2 g daily).    Prescription Medications as of 8/6/2018             acetaminophen 500 MG CAPS Take 1,000 mg by mouth nightly as needed Take 500-1,000 mg by mouth every 6 hours if needed.     calcitRIOL (ROCALTROL) 0.5 MCG capsule Take 1 capsule (0.5 mcg) by mouth daily    ciprofloxacin-dexamethasone (CIPRODEX) otic suspension Place 4 drops Into the left ear 2 times daily    clotrimazole (LOTRIMIN) 1 % cream Apply topically 2 times daily as needed Reported on 4/25/2017    ELIQUIS 2.5 MG tablet Take 2.5 mg by mouth 2 times daily     estradiol (ESTRACE VAGINAL) 0.1 MG/GM cream Place 2 g vaginally twice a week    ezetimibe (ZETIA) 10 MG tablet TAKE 1 TABLET BY MOUTH EVERY DAY OR AS DIRECTED BY DR WOLF    folic acid (FOLVITE) 1 MG tablet Take 1 tablet (1 mg) by mouth daily    furosemide (LASIX) 20 MG tablet TAKE 1/2 TABLET BY MOUTH EVERY DAY    hydrALAZINE (APRESOLINE) 25 MG tablet Take 0.5 tablets (12.5 mg) by mouth 3 times daily as needed , if systolic blood pressure is more than 140 mmHg.    Hypromellose (ARTIFICIAL TEARS OP) Apply 1 drop to eye as needed    ketoconazole (NIZORAL) 2 % cream Apply topically 2 times daily To angles of mouth    levothyroxine (SYNTHROID) 150 MCG tablet Take 150mcg by mouth daily 6 days a week & 225mcg on Mondays    levothyroxine (SYNTHROID) 150 MCG tablet Take 225 mcg by mouth on Mondays    meclizine (ANTIVERT) 25 MG tablet Take 1 tablet (25 mg) by mouth as needed    metoprolol (TOPROL-XL) 50 MG 24 hr tablet Take 1 tablet (50 mg) by mouth daily    Multiple Vitamins-Minerals (PRESERVISION AREDS 2) CAPS Take 1 capsule by mouth 2 times daily    neomycin-polymyxin-hydrocortisone (CORTISPORIN) 3.5-13031-1 otic suspension Place 4 drops Into the left ear 4 times daily    nitroFURantoin, macrocrystal-monohydrate, (MACROBID) 100 MG capsule Take 1 capsule (100 mg) by mouth 2 times daily For one week at onset of  UTI symptoms    omega-3 acid ethyl esters (LOVAZA) 1 g capsule Take 1 capsule (1 g) by mouth 2 times daily    predniSONE (DELTASONE) 5 MG tablet Take 1 tablet (5 mg) by mouth daily    RAPAMUNE (BRAND) 1 MG PO TABLET Take 1 tablet (1 mg) by mouth every other day    sertraline (ZOLOFT) 50 MG tablet Take 1 tablet (50 mg) by mouth daily    SUMAtriptan (IMITREX) 50 MG tablet Take 1 tablet (50 mg) by mouth at onset of headache for migraine repeat after 2 hours if needed.    triamcinolone (KENALOG) 0.1 % cream Apply topically 2 times daily as needed for irritation    ursodiol (ACTIGALL) 250 MG tablet Take 1 tablet (250 mg) by mouth 2 times daily    voriconazole (VFEND) 200 MG tablet Take 1 tablet (200 mg) by mouth 2 times daily    Wheat Dextrin (BENEFIBER) POWD 2 teaspoons daily        PAST MEDICAL HISTORY:   Primary biliary cirrhosis s/p transplant - may 2002.   Anemia of chronic disease.   Hypothyroidism for 30 years treated with levothyroxine   Post menopausal.   CVA in , symptoms have resolved.   VRE in the stool.   Sjogren syndrome, diagnosed in the .   Migraines.   UTIs.   Superficial phlebitis.   History of esophageal varices.   Varicose veins.   Heart murmur.  On dyalisis while in coma for hepatic encephalopathy for 18 days  CKD with an average creatinine of 1.3.    Osteoporosis  Sepsis of unclear etiology  and    Fatty pancreas on CT  Diverticulosis   Kidney stone -   Emphysema   Atrial fibrillation     PAST SURGICAL HISTORY:   Orthotopic liver transplant in .   Sclerotherapy for varicose veins in 2008.   Vein stripping for varicose veins in .   Laparoscopic cholecystectomy in .   Appendectomy in .   A benign tumor removal of her right knee in .   L Tympanoplasty in .  Cataract surgery      FH:  Father  of prostate cancer. Mother has HTN, GERD. Both paternal grandmother and paternal grandfather had strokes later in life. Maternal grandmother had colon surgery  "(?cancer) and myocardial infarct at age 97.     SH.   . She has 2 children and 2 stepchildren. Occupation: retired medical social worker. She denies smoking, drinking alcohol or using illicit drugs.    Review of Systems   Systemic:             Fatigue - longstanding and improved with the antidepressant, weight stable   Eye:                      No eye symptoms   Aracelis-Laryngeal:     no dysphagia, no hoarseness, no cough  Breast:                  No breast symptoms  Cardiovascular:    No cardiovascular symptoms, no CP or palpitations   Pulmonary:           SOB with exertion   Gastrointestinal:   No N/V, no diarrhea or constipation; take fiber daily   Genitourinary:       No genitourinary symptoms, no increased thirst or urination; urinates 0-1 times a night    Endocrine:            cold intolerance with no changes over the years   Neurological:        rare headaches, no tremor, no dizziness   Musculoskeletal:   seldom joint pain     Skin:                     Swollen feet - wears compression sockings, dry skin, no hair loss   Psychological:     No psychological symptoms    Wt Readings from Last 10 Encounters:   08/06/18 79.2 kg (174 lb 8 oz)   07/30/18 77.1 kg (170 lb)   07/25/18 78.2 kg (172 lb 4.8 oz)   07/16/18 78 kg (172 lb)   07/13/18 78.8 kg (173 lb 11.2 oz)   06/29/18 79.4 kg (175 lb)   06/27/18 80.5 kg (177 lb 8 oz)   06/18/18 78.9 kg (174 lb)   06/01/18 79.4 kg (175 lb)   06/01/18 79.8 kg (176 lb)     /81 (BP Location: Right arm)  Pulse 66  Ht 1.715 m (5' 7.52\")  Wt 79.2 kg (174 lb 8 oz)  LMP 06/01/1988 (Approximate)  BMI 26.91 kg/m2    General appearance: well-nourished, no distress noted, pale.  Eyes: conjunctivae and extraocular motions are normal. Pupils are equal, round, and reactive to light. No lid lag, no stare.  HENT: oropharynx clear and moist; neck no JVD, no bruits, no palpable thyroid masses   Cardiovascular: regular rhythm, + systolic murmur, distal pulses palpable, very mild " lower extremities edema  Respiratory: chest clear, no rales, no rhonchi  Gastrointestinal: abdomen soft, nontender, nondistended, +BS, no organomegaly  Musculoskeletal: normal tone and strength, no lower extremity edema  Neurologic: no resting tremor, knee reflexes - unable to elicit  Psychiatric: affect and judgment normal   Skin: warm, vitiligo, lower extremities stasis dermatitis     Feet:  impaired sensation to monofilament testing - tip of the toes and ball of the feet - bilaterally    Labs:   I reviewed prior lab results documented in EPIC.   TSH   Date Value Ref Range Status   07/30/2018 8.64 (H) 0.40 - 4.00 mU/L Final     T4 Total   Date Value Ref Range Status   09/23/2014 9.1 5.0 - 11.0 ug/dL Final     T4 Free   Date Value Ref Range Status   07/30/2018 1.02 0.76 - 1.46 ng/dL Final      Ref. Range 5/24/2016 08:46   Calcium Latest Ref Range: 8.5 - 10.1 mg/dL 8.8   GFR Estimate Latest Ref Range: >60 mL/min/1.7m2 29 (L)   Phosphorus Latest Ref Range: 2.5 - 4.5 mg/dL 3.2   Albumin Latest Ref Range: 3.4 - 5.0 g/dL 3.3 (L)   Parathyroid Hormone Intact Latest Ref Range: 12 - 72 pg/mL 60      Ref. Range 5/10/2017 09:29   Calcium Latest Ref Range: 8.5 - 10.1 mg/dL 9.7   GFR Estimate Latest Ref Range: >60 mL/min/1.7m2 34 (L)   Phosphorus Latest Ref Range: 2.5 - 4.5 mg/dL 3.1   Albumin Latest Ref Range: 3.4 - 5.0 g/dL 3.7     Assessment/Plan:    1. Type 2 diabetes.  Hemoglobin A1c on reliable in the context of anemia.      This is a new diagnosis.  The patient was counseled on the etiology of this condition, signs and symptoms of hyperglycemia, type 2 diabetes complications (both micro and macrovascular disease), target blood glucose numbers, the significance of A1c, the carbohydrate content of the main food products.  Clinically, she has evidence of microalbuminuria and peripheral neuropathy, which may represent diabetes complications.  Recommendations:  -Schedule an appointment with the dietitian;   -Start checking  the blood sugar twice daily, fasting and at bedtime;   -Check urine microalbumin;   -Check fasting blood sugar with her next lab work.  -Have the meter downloaded in our clinic in 3-4 weeks.  We are going to schedule a phone appointment at that time.    2. Postsurgical hypothyroidism   On the most recent lab work, the TSH was minimally elevated, while free T4 was normal.  I recommended to try to take levothyroxine 1 hour prior to breakfast and have the thyroid hormone levels rechecked in 6 weeks.    3. Hypocalcemia  Calcitriol was initially started in 2010 for management of postsurgical hypocalcemia, presumed to be due to hypoparathyroidism. The PTH level increased after surgery, but the hypocalcemia persisted, requiring tx with calcitriol despite normal 25 OH vitamin D levels. In 2012, it was aggravated by malabsorption, hypomagnesemia. It is now well controlled with calcitriol.  The daily intake of calcium appears to be appropriate, based on the amount of dairy products.    4. Papillary thyroid cancer s/p total thyroidectomy, central neck dissection and radioablation. Post treatment scan in 6/12 was negative for recurrence. Thyrogen stimulated Tg level was undetectable in June 2012. The nonstimulated thyroglobulin remained undetectable, with most recent value from 2016. On the most recent neck ultrasound from 2016, there were no suspicious looking lymph nodes.  I suspect she achieved cure.  I did not recommend further monitoring.    5.  Osteoporosis.  This was not addressed during today's visit.  DEXA scan was ordered by her primary care provider but was not scheduled yet.    Orders Placed This Encounter   Procedures     T4 free     TSH     Albumin Random Urine Quantitative with Creat Ratio     Basic metabolic panel     Hematocrit     Hemoglobin A1c     NUTRITION REFERRAL       Total visit time 40 min/counceling and coordination of care 20 min.

## 2018-08-06 NOTE — Clinical Note
Please mail a copy of the orders for the thyroid hormone levels to the patient.  She is going to have this lab work done in [place].  Also, please schedule her for an appointment with nephrology, when she comes back from [place].

## 2018-08-06 NOTE — MR AVS SNAPSHOT
After Visit Summary   8/6/2018    Luz Thompson    MRN: [id number]           Patient Information     Date Of Birth          1950        Visit Information        Provider Department      8/6/2018 9:00 AM Rach Vasquez MD [facility]        Today's Diagnoses     Age-related osteoporosis without current pathological fracture    -  1    Papillary thyroid carcinoma (H)        Type 2 diabetes mellitus with stage 3 chronic kidney disease, without long-term current use of insulin (H)          Care Instructions    Have the meter downloaded in our clinic in 3-4 weeks.           Follow-ups after your visit        Additional Services     NUTRITION REFERRAL       Your provider has referred you to: [facility]     Please be aware that coverage of these services is subject to the terms and limitations of your health insurance plan.  Call member services at your health plan with any benefit or coverage questions.      Please bring the following with you to your appointment:    (1) This referral request  (2) Any documents given to you regarding this referral  (3) Any specific questions you have about diet and/or food choices                  Follow-up notes from your care team     Return in about 1 year (around 8/6/2019) for dietician apt, labs in 6 weeks.      Your next 10 appointments already scheduled     Aug 13, 2018  1:00 PM CDT   Return Sleep Patient with Vero Quiñonez MD   [facility], [place], Sleep Study ([facility])    [address] [place] [id number] [phone]            Aug 15, 2018 11:30 AM CDT   (Arrive by 11:15 AM)   RETURN ATRIAL FIBULATION VISIT with LIZ Sauceda [facility] ([facility])    [address] [place] [address]  [address] [place] [address] [phone]            Aug 20, 2018 12:30 PM CDT   Office Visit with Trisha Timmons RD   [facility],  [place],  Diabetes Education ([facility])    [address]  [address]  [facility] [id number] [phone]           Bring a current list of meds and any records pertaining to this visit. For Physicals, please bring immunization records and any forms needing to be filled out. Please arrive 10 minutes early to complete paperwork.            Aug 27, 2018  1:15 PM CDT   Return Visit with Randell Mejia MD   [facility] ([facility])    [address] [place] [id number] [phone]            Sep 14, 2018 10:00 AM CDT   CT CHEST W/O CONTRAST with UCCT2   [facility] [place] [facility] ([facility])    [address]  1st Floor  [facility] [id number] [phone]           Please bring any scans or X-rays taken at other hospitals, if similar tests were done. Also bring a list of your medicines, including vitamins, minerals and over-the-counter drugs. It is safest to leave personal items at home.  Be sure to tell your doctor:   If you have any allergies.   If there s any chance you are pregnant.   If you are breastfeeding.  You do not need to do anything special to prepare for this exam.  Please wear loose clothing, such as a sweat suit or jogging clothes. Avoid snaps, zippers and other metal. We may ask you to undress and put on a hospital gown.            Sep 14, 2018 11:00 AM CDT   (Arrive by 10:45 AM)   Return Visit with LIZ Gamboa CNP   [facility] ([facility] [place])    [address]  [address]  [facility] [id number] [phone]            May 15, 2019 10:30 AM CDT   (Arrive by 10:15 AM)   Return Visit with Crystal Montero MD   [facility] ([facility])    [address]  [address]  [facility] [id number] [phone]             "  Future tests that were ordered for you today     Open Future Orders        Priority Expected Expires Ordered    T4 free Routine 9/20/2018 8/6/2019 8/6/2018    TSH Routine 9/20/2018 8/6/2019 8/6/2018    Albumin Random Urine Quantitative with Creat Ratio Routine 9/20/2018 8/6/2019 8/6/2018    Basic metabolic panel Routine 9/20/2018 8/6/2019 8/6/2018    Hematocrit Routine 9/20/2018 8/6/2019 8/6/2018    Hemoglobin A1c Routine 9/20/2018 8/6/2019 8/6/2018            Who to contact     Please call your clinic at [phone] to:    Ask questions about your health    Make or cancel appointments    Discuss your medicines    Learn about your test results    Speak to your doctor            Additional Information About Your Visit        [employer] Information     [employer] gives you secure access to your electronic health record. If you see a primary care provider, you can also send messages to your care team and make appointments. If you have questions, please call your primary care clinic.  If you do not have a primary care provider, please call [phone] and they will assist you.      [employer] is an electronic gateway that provides easy, online access to your medical records. With [employer], you can request a clinic appointment, read your test results, renew a prescription or communicate with your care team.     To access your existing account, please contact your [facility] or call [phone] for assistance.        Care EveryWhere ID     This is your Care EveryWhere ID. This could be used by other organizations to access your [facility] medical records  [id number]        Your Vitals Were     Pulse Height Last Period BMI (Body Mass Index)          66 1.715 m (5' 7.52\") 06/01/1988 (Approximate) 26.91 kg/m2         Blood Pressure from Last 3 Encounters:   08/06/18 146/81   07/30/18 173/74   07/25/18 134/77    Weight from Last 3 Encounters:   08/06/18 79.2 kg (174 lb 8 oz)   07/30/18 77.1 kg (170 " lb)   07/25/18 78.2 kg (172 lb 4.8 oz)              We Performed the Following     NUTRITION REFERRAL          Today's Medication Changes          These changes are accurate as of 8/6/18  9:39 AM.  If you have any questions, ask your nurse or doctor.               Start taking these medicines.        Dose/Directions    blood glucose monitoring meter device kit   Commonly known as:  no brand specified   Used for:  Type 2 diabetes mellitus with stage 3 chronic kidney disease, without long-term current use of insulin (H)   Started by:  Rach Vasquez MD        Use to test blood sugar 2times daily or as directed.   Quantity:  1 kit   Refills:  0       blood glucose monitoring test strip   Commonly known as:  no brand specified   Used for:  Type 2 diabetes mellitus with stage 3 chronic kidney disease, without long-term current use of insulin (H)   Started by:  Rach Vasquez MD        Use to test blood sugar 2 times daily, before breakfast and before bedtime   Quantity:  200 each   Refills:  3            Where to get your medicines      These medications were sent to [id number] - [place], [place] - [address] [facility]  [address] [facility] [place] [facility] [address]    Hours:  test fax successful 4/5/04 kr Phone:  [phone]     blood glucose monitoring meter device kit    blood glucose monitoring test strip                Primary Care Provider Office Phone # Fax #    Jennifer Barraza [phone]       [address] [place]  [facility] [address]        Equal Access to Services     [facility] AH: Hadii brayan guo hadnieshao Sokelsi, waaxda luqadaha, qaybta kaalmada adeegyada, etta brnener'augustus hardy. So [facility] [phone].    ATENCIÓN: Si habla español, tiene a jason disposición servicios gratuitos de asistencia lingüística. Llame al [phone].    We comply with applicable federal civil rights laws and [place] laws. We do not discriminate on the basis of race,  color, national origin, age, disability, sex, sexual orientation, or gender identity.            Thank you!     Thank you for choosing [facility]  for your care. Our goal is always to provide you with excellent care. Hearing back from our patients is one way we can continue to improve our services. Please take a few minutes to complete the written survey that you may receive in the mail after your visit with us. Thank you!             Your Updated Medication List - Protect others around you: Learn how to safely use, store and throw away your medicines at www.disposemymeds.org.          This list is accurate as of 8/6/18  9:39 AM.  Always use your most recent med list.                   Brand Name Dispense Instructions for use Diagnosis    acetaminophen 500 MG Caps      Take 1,000 mg by mouth nightly as needed Take 500-1,000 mg by mouth every 6 hours if needed.        ARTIFICIAL TEARS OP      Apply 1 drop to eye as needed        BENEFIBER Powd      2 teaspoons daily        blood glucose monitoring meter device kit    no brand specified    1 kit    Use to test blood sugar 2times daily or as directed.    Type 2 diabetes mellitus with stage 3 chronic kidney disease, without long-term current use of insulin (H)       blood glucose monitoring test strip    no brand specified    200 each    Use to test blood sugar 2 times daily, before breakfast and before bedtime    Type 2 diabetes mellitus with stage 3 chronic kidney disease, without long-term current use of insulin (H)       calcitRIOL 0.5 MCG capsule    ROCALTROL    90 capsule    Take 1 capsule (0.5 mcg) by mouth daily    Postablative hypoparathyroidism (H)       ciprofloxacin-dexamethasone otic suspension    CIPRODEX    5.6 mL    Place 4 drops Into the left ear 2 times daily    Other infective chronic otitis externa of left ear, Tympanic membrane perforation, left, Sensorineural hearing loss (SNHL) of both ears       clotrimazole 1 % cream    LOTRIMIN      Apply topically 2 times daily as needed Reported on 4/25/2017        ELIQUIS 2.5 MG tablet   Generic drug:  apixaban ANTICOAGULANT      Take 2.5 mg by mouth 2 times daily        estradiol 0.1 MG/GM cream    ESTRACE VAGINAL    42.5 g    Place 2 g vaginally twice a week    Atrophic vaginitis       ezetimibe 10 MG tablet    ZETIA    90 tablet    TAKE 1 TABLET BY MOUTH EVERY DAY OR AS DIRECTED BY DR WOLF    Hyperlipidemia LDL goal <70, Benign essential hypertension       folic acid 1 MG tablet    FOLVITE    100 tablet    Take 1 tablet (1 mg) by mouth daily    Liver replaced by transplant (H)       furosemide 20 MG tablet    LASIX    45 tablet    TAKE 1/2 TABLET BY MOUTH EVERY DAY    CKD (chronic kidney disease) stage 3, GFR 30-59 ml/min, Liver replaced by transplant (H)       hydrALAZINE 25 MG tablet    APRESOLINE    270 tablet    Take 0.5 tablets (12.5 mg) by mouth 3 times daily as needed , if systolic blood pressure is more than 140 mmHg.    HTN (hypertension)       ketoconazole 2 % cream    NIZORAL    15 g    Apply topically 2 times daily To angles of mouth    Angular cheilitis       LOVAZA 1 g capsule   Generic drug:  omega-3 acid ethyl esters     360 capsule    Take 1 capsule (1 g) by mouth 2 times daily    Hypertriglyceridemia       meclizine 25 MG tablet    ANTIVERT    30 tablet    Take 1 tablet (25 mg) by mouth as needed    Dizziness       metoprolol succinate 50 MG 24 hr tablet    TOPROL-XL    90 tablet    Take 1 tablet (50 mg) by mouth daily    Paroxysmal atrial fibrillation (H)       neomycin-polymyxin-hydrocortisone 3.5-62332-8 otic suspension    CORTISPORIN    10 mL    Place 4 drops Into the left ear 4 times daily    Acute swimmer's ear of left side       nitroFURantoin (macrocrystal-monohydrate) 100 MG capsule    MACROBID    14 capsule    Take 1 capsule (100 mg) by mouth 2 times daily For one week at onset of UTI symptoms    Urinary tract infection without hematuria, site unspecified       predniSONE 5  MG tablet    DELTASONE    90 tablet    Take 1 tablet (5 mg) by mouth daily    Thyroid cancer (H), Liver replaced by transplant (H)       PRESERVISION AREDS 2 Caps      Take 1 capsule by mouth 2 times daily        sertraline 50 MG tablet    ZOLOFT    90 tablet    Take 1 tablet (50 mg) by mouth daily    Adjustment disorder with depressed mood       sirolimus 1 MG Tabs tablet     45 tablet    Take 1 tablet (1 mg) by mouth every other day    Liver replaced by transplant (H)       SUMAtriptan 50 MG tablet    IMITREX    30 tablet    Take 1 tablet (50 mg) by mouth at onset of headache for migraine repeat after 2 hours if needed.    Migraine without aura and without status migrainosus, not intractable       * SYNTHROID 150 MCG tablet   Generic drug:  levothyroxine      Take 225 mcg by mouth on Mondays        * levothyroxine 150 MCG tablet    SYNTHROID    120 tablet    Take 150mcg by mouth daily 6 days a week & 225mcg on Mondays    Malignant neoplasm of thyroid gland (H)       triamcinolone 0.1 % cream    KENALOG     Apply topically 2 times daily as needed for irritation        ursodiol 250 MG tablet    ACTIGALL    180 tablet    Take 1 tablet (250 mg) by mouth 2 times daily    Liver replaced by transplant (H)       voriconazole 200 MG tablet    VFEND    60 tablet    Take 1 tablet (200 mg) by mouth 2 times daily    Coccidioidal pneumonitis (H)       * Notice:  This list has 2 medication(s) that are the same as other medications prescribed for you. Read the directions carefully, and ask your doctor or other care provider to review them with you.

## 2018-08-06 NOTE — LETTER
8/6/2018       RE: Luz Thompson  [address]  [facility] [address]     Dear Colleague,    Thank you for referring your patient, Luz Thompson, to the [facility] ENDOCRINOLOGY at [facility]. Please see a copy of my visit note below.    The patient returns to the clinic for follow-up of papillary thyroid cancer, hypocalcemia.     Ms Thompson is a 68 year old woman s/p 5/22/02 orthotopic liver transplant for primary biliary cirrhosis, on immunosuppression treatment (rapamune, prednisone).     She reports going through significant stress, as her  is filing for divorce.  She continues to spend the cleary in [place], from October until May.  Currently, she undergoes treatment with voriconazole.  A biopsy of the lung was inconclusive and she is scheduled to have a follow-up CT in September.  Today, she did not take her morning blood pressure medications.    1. Papillary thyroid cancer, S/P total thyroidectomy and central neck dissection on March 2nd, 2010. Follicular variant, solitary, 3 cm in greatest diameter, with no capsular invasion and negative central neck lymph nodes (0/18 lymph nodes).  MACIS score 5.78.     The WBS done on 12/9/2010 revealed 3 vaque foci of uptake in the L neck. Total iodine uptake was 0.2%.   She underwent radioablation with 159.2 mCi I 131, on 1/27/2010. The WBS done 1 week post treatment revealed 3 foci of uptake in the L neck, which were previously seen on the pretreatment scan.     11/5/12 neck US - normal appearing lymph nodes levels 2 B/L and 3 LN at left level 3   6/7/12 posttreatment WBS - negative   6/25/13 neck US - didn't identify lymph nodes with features suspicious of malignancy. A right level II lymph node appeared to be mildly larger compared with prior images from 2010. Two left level III lymph nodes were again noted, of normal size.   5/19/14 and 6/14/2016 neck USs  - no definite suspicious looking lymph  nodes    Thyroglobulin antibodies were negative. I reviewed prior tyroglobulin levels:  5/5/10         0.21          TSH 7.48   10/25/10     <0.1          TSH 2.92  12/08/10      8.3             12/10/10      3.1           TSH 58.6   8/4/11         <0.1          TSH 0.03  6/8/12         <0.1          TSH 75.1  6/25/13       <0.1          TSH 12.8  3/5/14         <0.1          TSH 6.32   4/15/15       <0.1          TSH 2.4  5/24/16       <0.1          TSH 3.65     She continues to take 150  g levothyroxine daily 6 days a week and 1 1/2 tablets one day a week.  She takes the levothyroxine 30 minutes before breakfast and she does not have soy products with breakfast.    2. Hypocalcemia - requiring treatment with 0.5 mcg calcitriol daily, in the context of CKD with GFR in the 40s.     Most recent corrected calcium level was 9.7, this month. Last PTH level was normal at 60, in 2016.    In a regular day, she drinks 2-3 glasses of milk and she takes an eye multivitamin.  She denies taking vitamin D or calcium supplements.    3.  Osteoporosis, not previously treated with antiresorptive drugs, due to impaired GFR.  Most recent DEXA scan from 2015 revealed a significant improvement of bone mineral density at the lumbar spine and mean hip (mainly right femoral neck).  The most negative T score was -2.8, at the right femoral neck.  She denies any falls or fractures since her last visit here.  The dose of prednisone has remained unchanged, 5 mg daily.  In terms of physical exercise, she works out 2 times a week, with a , for approximately 1 hour (post stretching and weight exercises).  She has not been walking as much as she used to.    4. Type 2 diabetes   This is a new diagnosis. A1c was 6.8%, in May this year.  Her  has type 1 diabetes.  She denies blurred vision, the last eye exam was a couple of weeks ago and the patient denies being told that she has eye changes suggestive of diabetes. She has galucoma  and sees olpthamology regularly.   For the last couple of months, she has noticed numbness and tingling sensation in her toes.  Urine microalbumin has been positive since 2014.     5.  Mixed dyslipidemia  She follows up with cardiology.  She was not able to tolerate statins due to joint pain.  Currently, she is medicated with Zetia and Lovaza, 2 g twice daily (she only tolerates 2 g daily).    Prescription Medications as of 8/6/2018             acetaminophen 500 MG CAPS Take 1,000 mg by mouth nightly as needed Take 500-1,000 mg by mouth every 6 hours if needed.     calcitRIOL (ROCALTROL) 0.5 MCG capsule Take 1 capsule (0.5 mcg) by mouth daily    ciprofloxacin-dexamethasone (CIPRODEX) otic suspension Place 4 drops Into the left ear 2 times daily    clotrimazole (LOTRIMIN) 1 % cream Apply topically 2 times daily as needed Reported on 4/25/2017    ELIQUIS 2.5 MG tablet Take 2.5 mg by mouth 2 times daily     estradiol (ESTRACE VAGINAL) 0.1 MG/GM cream Place 2 g vaginally twice a week    ezetimibe (ZETIA) 10 MG tablet TAKE 1 TABLET BY MOUTH EVERY DAY OR AS DIRECTED BY DR WOLF    folic acid (FOLVITE) 1 MG tablet Take 1 tablet (1 mg) by mouth daily    furosemide (LASIX) 20 MG tablet TAKE 1/2 TABLET BY MOUTH EVERY DAY    hydrALAZINE (APRESOLINE) 25 MG tablet Take 0.5 tablets (12.5 mg) by mouth 3 times daily as needed , if systolic blood pressure is more than 140 mmHg.    Hypromellose (ARTIFICIAL TEARS OP) Apply 1 drop to eye as needed    ketoconazole (NIZORAL) 2 % cream Apply topically 2 times daily To angles of mouth    levothyroxine (SYNTHROID) 150 MCG tablet Take 150mcg by mouth daily 6 days a week & 225mcg on Mondays    levothyroxine (SYNTHROID) 150 MCG tablet Take 225 mcg by mouth on Mondays    meclizine (ANTIVERT) 25 MG tablet Take 1 tablet (25 mg) by mouth as needed    metoprolol (TOPROL-XL) 50 MG 24 hr tablet Take 1 tablet (50 mg) by mouth daily    Multiple Vitamins-Minerals (PRESERVISION AREDS 2) CAPS Take 1  capsule by mouth 2 times daily    neomycin-polymyxin-hydrocortisone (CORTISPORIN) 3.5-17916-6 otic suspension Place 4 drops Into the left ear 4 times daily    nitroFURantoin, macrocrystal-monohydrate, (MACROBID) 100 MG capsule Take 1 capsule (100 mg) by mouth 2 times daily For one week at onset of UTI symptoms    omega-3 acid ethyl esters (LOVAZA) 1 g capsule Take 1 capsule (1 g) by mouth 2 times daily    predniSONE (DELTASONE) 5 MG tablet Take 1 tablet (5 mg) by mouth daily    RAPAMUNE (BRAND) 1 MG PO TABLET Take 1 tablet (1 mg) by mouth every other day    sertraline (ZOLOFT) 50 MG tablet Take 1 tablet (50 mg) by mouth daily    SUMAtriptan (IMITREX) 50 MG tablet Take 1 tablet (50 mg) by mouth at onset of headache for migraine repeat after 2 hours if needed.    triamcinolone (KENALOG) 0.1 % cream Apply topically 2 times daily as needed for irritation    ursodiol (ACTIGALL) 250 MG tablet Take 1 tablet (250 mg) by mouth 2 times daily    voriconazole (VFEND) 200 MG tablet Take 1 tablet (200 mg) by mouth 2 times daily    Wheat Dextrin (BENEFIBER) POWD 2 teaspoons daily        PAST MEDICAL HISTORY:   Primary biliary cirrhosis s/p transplant - may 2002.   Anemia of chronic disease.   Hypothyroidism for 30 years treated with levothyroxine   Post menopausal.   CVA in 2001, symptoms have resolved.   VRE in the stool.   Sjogren syndrome, diagnosed in the 1990s.   Migraines.   UTIs.   Superficial phlebitis.   History of esophageal varices.   Varicose veins.   Heart murmur.  On dyalisis while in coma for hepatic encephalopathy for 18 days  CKD with an average creatinine of 1.3.    Osteoporosis  Sepsis of unclear etiology 2013 and 2014   Fatty pancreas on CT  Diverticulosis   Kidney stone - 2013  Emphysema   Atrial fibrillation     PAST SURGICAL HISTORY:   Orthotopic liver transplant in 2002.   Sclerotherapy for varicose veins in 09/2008.   Vein stripping for varicose veins in 2007.   Laparoscopic cholecystectomy in 1991.  "  Appendectomy in .   A benign tumor removal of her right knee in .   L Tympanoplasty in .  Cataract surgery      FH:  Father  of prostate cancer. Mother has HTN, GERD. Both paternal grandmother and paternal grandfather had strokes later in life. Maternal grandmother had colon surgery (?cancer) and myocardial infarct at age 97.     SH.   . She has 2 children and 2 stepchildren. Occupation: retired medical social worker. She denies smoking, drinking alcohol or using illicit drugs.    Review of Systems   Systemic:             Fatigue - longstanding and improved with the antidepressant, weight stable   Eye:                      No eye symptoms   Aracelis-Laryngeal:     no dysphagia, no hoarseness, no cough  Breast:                  No breast symptoms  Cardiovascular:    No cardiovascular symptoms, no CP or palpitations   Pulmonary:           SOB with exertion   Gastrointestinal:   No N/V, no diarrhea or constipation; take fiber daily   Genitourinary:       No genitourinary symptoms, no increased thirst or urination; urinates 0-1 times a night    Endocrine:            cold intolerance with no changes over the years   Neurological:        rare headaches, no tremor, no dizziness   Musculoskeletal:   seldom joint pain     Skin:                     Swollen feet - wears compression sockings, dry skin, no hair loss   Psychological:     No psychological symptoms    Wt Readings from Last 10 Encounters:   18 79.2 kg (174 lb 8 oz)   18 77.1 kg (170 lb)   18 78.2 kg (172 lb 4.8 oz)   18 78 kg (172 lb)   18 78.8 kg (173 lb 11.2 oz)   18 79.4 kg (175 lb)   18 80.5 kg (177 lb 8 oz)   18 78.9 kg (174 lb)   18 79.4 kg (175 lb)   18 79.8 kg (176 lb)     /81 (BP Location: Right arm)  Pulse 66  Ht 1.715 m (5' 7.52\")  Wt 79.2 kg (174 lb 8 oz)  LMP 1988 (Approximate)  BMI 26.91 kg/m2    General appearance: well-nourished, no distress noted, " pale.  Eyes: conjunctivae and extraocular motions are normal. Pupils are equal, round, and reactive to light. No lid lag, no stare.  HENT: oropharynx clear and moist; neck no JVD, no bruits, no palpable thyroid masses   Cardiovascular: regular rhythm, + systolic murmur, distal pulses palpable, very mild lower extremities edema  Respiratory: chest clear, no rales, no rhonchi  Gastrointestinal: abdomen soft, nontender, nondistended, +BS, no organomegaly  Musculoskeletal: normal tone and strength, no lower extremity edema  Neurologic: no resting tremor, knee reflexes - unable to elicit  Psychiatric: affect and judgment normal   Skin: warm, vitiligo, lower extremities stasis dermatitis     Feet:  impaired sensation to monofilament testing - tip of the toes and ball of the feet - bilaterally    Labs:   I reviewed prior lab results documented in EPIC.   TSH   Date Value Ref Range Status   07/30/2018 8.64 (H) 0.40 - 4.00 mU/L Final     T4 Total   Date Value Ref Range Status   09/23/2014 9.1 5.0 - 11.0 ug/dL Final     T4 Free   Date Value Ref Range Status   07/30/2018 1.02 0.76 - 1.46 ng/dL Final      Ref. Range 5/24/2016 08:46   Calcium Latest Ref Range: 8.5 - 10.1 mg/dL 8.8   GFR Estimate Latest Ref Range: >60 mL/min/1.7m2 29 (L)   Phosphorus Latest Ref Range: 2.5 - 4.5 mg/dL 3.2   Albumin Latest Ref Range: 3.4 - 5.0 g/dL 3.3 (L)   Parathyroid Hormone Intact Latest Ref Range: 12 - 72 pg/mL 60      Ref. Range 5/10/2017 09:29   Calcium Latest Ref Range: 8.5 - 10.1 mg/dL 9.7   GFR Estimate Latest Ref Range: >60 mL/min/1.7m2 34 (L)   Phosphorus Latest Ref Range: 2.5 - 4.5 mg/dL 3.1   Albumin Latest Ref Range: 3.4 - 5.0 g/dL 3.7     Assessment/Plan:    1. Type 2 diabetes.  Hemoglobin A1c on reliable in the context of anemia.      This is a new diagnosis.  The patient was counseled on the etiology of this condition, signs and symptoms of hyperglycemia, type 2 diabetes complications (both micro and macrovascular disease), target  blood glucose numbers, the significance of A1c, the carbohydrate content of the main food products.  Clinically, she has evidence of microalbuminuria and peripheral neuropathy, which may represent diabetes complications.  Recommendations:  -Schedule an appointment with the dietitian;   -Start checking the blood sugar twice daily, fasting and at bedtime;   -Check urine microalbumin;   -Check fasting blood sugar with her next lab work.  -Have the meter downloaded in our clinic in 3-4 weeks.  We are going to schedule a phone appointment at that time.    2. Postsurgical hypothyroidism   On the most recent lab work, the TSH was minimally elevated, while free T4 was normal.  I recommended to try to take levothyroxine 1 hour prior to breakfast and have the thyroid hormone levels rechecked in 6 weeks.    3. Hypocalcemia  Calcitriol was initially started in 2010 for management of postsurgical hypocalcemia, presumed to be due to hypoparathyroidism. The PTH level increased after surgery, but the hypocalcemia persisted, requiring tx with calcitriol despite normal 25 OH vitamin D levels. In 2012, it was aggravated by malabsorption, hypomagnesemia. It is now well controlled with calcitriol.  The daily intake of calcium appears to be appropriate, based on the amount of dairy products.    4. Papillary thyroid cancer s/p total thyroidectomy, central neck dissection and radioablation. Post treatment scan in 6/12 was negative for recurrence. Thyrogen stimulated Tg level was undetectable in June 2012. The nonstimulated thyroglobulin remained undetectable, with most recent value from 2016. On the most recent neck ultrasound from 2016, there were no suspicious looking lymph nodes.  I suspect she achieved cure.  I did not recommend further monitoring.    5.  Osteoporosis.  This was not addressed during today's visit.  DEXA scan was ordered by her primary care provider but was not scheduled yet.    Orders Placed This Encounter   Procedures      T4 free     TSH     Albumin Random Urine Quantitative with Creat Ratio     Basic metabolic panel     Hematocrit     Hemoglobin A1c     NUTRITION REFERRAL       Total visit time 40 min/counceling and coordination of care 20 min.      Again, thank you for allowing me to participate in the care of your patient.      Sincerely,    Rach Vasquez MD

## 2018-08-10 ENCOUNTER — TELEPHONE (OUTPATIENT)
Dept: TRANSPLANT | Facility: CLINIC | Age: 68
End: 2018-08-10

## 2018-08-10 DIAGNOSIS — Z94.4 LIVER REPLACED BY TRANSPLANT (H): Primary | ICD-10-CM

## 2018-08-10 RX ORDER — FERROUS GLUCONATE 324(38)MG
324 TABLET ORAL
Qty: 90 TABLET | Refills: 0 | Status: ON HOLD | OUTPATIENT
Start: 2018-08-10 | End: 2019-08-31

## 2018-08-10 NOTE — TELEPHONE ENCOUNTER
Reviewed labs with Dr Ramirez. Patient to discuss thyroid with her PCP. Pt Needs ferrous gluconate 3x a day for 1 month. Pt is aware and will pick it up when she flys back into town today or Monday.

## 2018-08-13 ENCOUNTER — OFFICE VISIT (OUTPATIENT)
Dept: SLEEP MEDICINE | Facility: CLINIC | Age: 68
End: 2018-08-13
Payer: MEDICARE

## 2018-08-13 VITALS
SYSTOLIC BLOOD PRESSURE: 159 MMHG | BODY MASS INDEX: 27.15 KG/M2 | OXYGEN SATURATION: 96 % | DIASTOLIC BLOOD PRESSURE: 82 MMHG | RESPIRATION RATE: 16 BRPM | WEIGHT: 173 LBS | HEIGHT: 67 IN | HEART RATE: 72 BPM

## 2018-08-13 DIAGNOSIS — G47.33 OSA (OBSTRUCTIVE SLEEP APNEA): Primary | ICD-10-CM

## 2018-08-13 PROCEDURE — 99214 OFFICE O/P EST MOD 30 MIN: CPT | Performed by: INTERNAL MEDICINE

## 2018-08-13 RX ORDER — SIROLIMUS 0.5 MG/1
TABLET, SUGAR COATED ORAL
COMMUNITY
Start: 2018-06-25 | End: 2018-08-13 | Stop reason: DRUGHIGH

## 2018-08-13 NOTE — NURSING NOTE
Patient has an as needed blood pressure medication she will take when she gets home and will MyChart us what her Blood pressure is after she takes her medication.     NORMA Sanches  Sleep Clinic-Specialist,    Registered Medical Assistant   [facility]

## 2018-08-13 NOTE — PROGRESS NOTES
SLEEP CLINIC FOLLOW UP VISIT    Visit Date:  08/13/2018      CHIEF COMPLAINT AND PURPOSE OF VISIT:  Review polysomnogram (PSG) test results.      HISTORY OF PRESENT ILLNESS:  Ms. Luz Thompson is a 68-year-old female who presents to the Sleep Clinic today to review the results of recently obtained PSG 7/26/18.      PSG results:  Sleep Architecture: all stages of sleep were seen; REM sleep was increased.   The total recording time of the polysomnogram was 437.6 minutes. The total sleep time was 372.5 minutes. Sleep latency was normal at 11.9 minutes without the use of a sleep aid. REM latency was 83.5 minutes. Arousal index was normal at 10.0 arousals per hour. Sleep efficiency was normal at 85.1%. Wake after sleep onset was 51.5 minutes. The patient spent 8.2% of total sleep time in Stage N1, 56.0% in Stage N2, 6.8% in Stage N3, and 29.0% in REM. Time in REM supine was 54.5 minutes.     Respiration: Mild obstructive sleep apnea, predominant during supine sleep, with sleep associated hypoxemia (15 minutes <88% SPO2). Non-supine sleep demonstrated marked improvement in AHI and SPO2.   Events ? The polysomnogram revealed a presence of 5 obstructive, 3 centrals, and 1 mixed apneas resulting in an apnea index of 1.4 events per hour. There were 37 obstructive hypopneas and - central hypopneas resulting in an obstructive hypopnea index of 6.0 and central hypopnea index of - events per hour. The combined apnea/hypopnea index was 7.4 events per hour (central apnea/hypopnea index was 0.5 events per hour). The REM AHI was 10.6 events per hour. The supine AHI was 13.0 events per hour. The RERA index was 1.4 events per hour. The RDI was 8.9 events per hour.     Snoring - was reported as mild.     Respiratory rate and pattern - was notable for normal respiratory rate and pattern.     Sustained Sleep Associated Hypoventilation - Transcutaneous carbon dioxide monitoring was not used.     Sleep Associated Hypoxemia - (Greater  "than 5 minutes O2 sat at or below 88%) was present. Baseline oxygen saturation was 93.1%. Lowest oxygen saturation was 73.3%. Time spent less than or equal to 88% was 15.2 minutes. Time spent less than or equal to 89% was 24.8 minutes.     Movement Activity: Frequent periodic limb movements seen in NREM sleep during the first half of the night. These events were not associated with arousals.     Periodic Limb Activity - There were 280 PLMs during the entire study. The PLM index was 45.1 movements per hour. The PLM Arousal Index was - per hour.     REM EMG Activity - Excessive transient/sustained muscle activity was not present.     Nocturnal Behavior - Abnormal sleep related behaviors were not noted during NREM / REM sleep.     Bruxism - None apparent    Cardiac Summary: Sinus rhythm with occasional runs of repetitive PAC s when in N2/N3 sleep. There was no evidence of  atrial fibrillation on this study.       The test results were discussed with the patient in detail.       Current meds, Past medical history, Past surgical history, Allergies, Social history, Family history: reviewed, per EMR    Exam:  /82  Pulse 72  Resp 16  Ht 1.702 m (5' 7\")  Wt 78.5 kg (173 lb)  LMP 06/01/1988 (Approximate)  SpO2 96%  BMI 27.1 kg/m2  General appearance:  in no apparent distress  Pt is dressed casually, cooperative with good eye contact.   Speech is spontaneous with regular rate and volume.   Mood: euthymic; affect congruent with full range and intensity.   Sensorium: awake, alert and oriented to person, place, time, and situation.      ASSESSMENT/PLAN:    1. Mild obstructive sleep apnea and  sleep-associated hypoxemiapredominant during sleep in supine position.  There was no evidence of obstructive sleep apnea or sleep-associated hypoxemia during non-supine sleep position.  We discussed the option of positional therapy during sleep. She was interested in the positional therapy.  We discussed the option of cost " "effective tennis ball T-shirt, U-shaped body pillow and the FDA approved Zzoma pillow.  She was instructed to get back to me once she gets the positional device, so we can schedule a home sleep study to check the effectiveness of positional treatment for sleep apnea.  She wants to get back to me if she feels the need to  follow up after her return from [place] in May 2018(traveling to [place] in Oct to spend winter).   Encouraged following healthy diet and exercising regularly.      2. She has been going through a hard time since March 2018 since she got  from her .  She has been on an antidepressant, with which her symptoms have improved.  She denies suicidal ideations or thoughts of harming others.  Also, last year, her mother passed away.  She was recommended to continue to follow up with her psychiatrist AMITA Mercer  for optimizing the management of the mood disorder.      3. Frequent periodic limb movements were seen during NREM sleep, during the first half of the night of the sleep study. These events were not associated with arousals. She attributes her leg symptoms to diabetic neuropathy. I suspect she has both RLS and neuropathy.  She does not think that her symptoms affect her sleep quality.  We discussed the nonpharmacological measures to control the symptoms of RLS.     4. During the PSG, Sinus rhythm with occasional runs of repetitive PAC s when in N2/N3 sleep. There was no evidence of  atrial fibrillation . She is asymptomatic. She has f/u with cardiology on 8/15/18.  I have routed the information about the EKG findings during the sleep study to the cardiologist.     She was instructed to avoid driving if drowsy or sleepy to prevent accidents.      The above note was dictated using voice recognition software. Although reviewed after completion, some word and grammatical error may remain . Please contact the author for any clarifications.    \"I spent a total of 25 minutes face to face with " "Isa Thompson during today's office visit. Over 50% of this time was spent counseling the patient and  coordinating care regarding sleep apnea, positional therapy during sleep, RLS, EKG findings.\"        JARETH ASHBY MD             D: 2018   T: 2018   MT: ELI      Name:     ISA THOMPSON   MRN:      [id number]        Account:      [id number]   :      1950           Visit Date:   2018      Document: [id number]      "

## 2018-08-13 NOTE — PATIENT INSTRUCTIONS
Your BMI is Body mass index is 27.1 kg/(m^2).  Weight management is a personal decision.  If you are interested in exploring weight loss strategies, the following discussion covers the approaches that may be successful. Body mass index (BMI) is one way to tell whether you are at a healthy weight, overweight, or obese. It measures your weight in relation to your height.  A BMI of 18.5 to 24.9 is in the healthy range. A person with a BMI of 25 to 29.9 is considered overweight, and someone with a BMI of 30 or greater is considered obese. More than two-thirds of American adults are considered overweight or obese.  Being overweight or obese increases the risk for further weight gain. Excess weight may lead to heart disease and diabetes.  Creating and following plans for healthy eating and physical activity may help you improve your health.  Weight control is part of healthy lifestyle and includes exercise, emotional health, and healthy eating habits. Careful eating habits lifelong are the mainstay of weight control. Though there are significant health benefits from weight loss, long-term weight loss with diet alone may be very difficult to achieve- studies show long-term success with dietary management in less than 10% of people. Attaining a healthy weight may be especially difficult to achieve in those with severe obesity. In some cases, medications, devices and surgical management might be considered.  What can you do?  If you are overweight or obese and are interested in methods for weight loss, you should discuss this with your provider.     Consider reducing daily calorie intake by 500 calories.     Keep a food journal.     Avoiding skipping meals, consider cutting portions instead.    Diet combined with exercise helps maintain muscle while optimizing fat loss. Strength training is particularly important for building and maintaining muscle mass. Exercise helps reduce stress, increase energy, and improves fitness.  Increasing exercise without diet control, however, may not burn enough calories to loose weight.       Start walking three days a week 10-20 minutes at a time    Work towards walking thirty minutes five days a week     Eventually, increase the speed of your walking for 1-2 minutes at time    In addition, we recommend that you review healthy lifestyles and methods for weight loss available through the National Institutes of Health patient information sites:  http://win.niddk.nih.gov/publications/index.htm    And look into health and wellness programs that may be available through your health insurance provider, employer, local community center, or kristie club.    Weight management plan: Patient was referred to their PCP to discuss a diet and exercise plan.     Your blood pressure was checked while you were in clinic today.  Please read the guidelines below about what these numbers mean and what you should do about them.  Your systolic blood pressure is the top number.  This is the pressure when the heart is pumping.  Your diastolic blood pressure is the bottom number.  This is the pressure in between beats.  If your systolic blood pressure is less than 120 and your diastolic blood pressure is less than 80, then your blood pressure is normal. There is nothing more that you need to do about it  If your systolic blood pressure is 120-139 or your diastolic blood pressure is 80-89, your blood pressure may be higher than it should be.  You should have your blood pressure re-checked within a year by a primary care provider.  If your systolic blood pressure is 140 or greater or your diastolic blood pressure is 90 or greater, you may have high blood pressure.  High blood pressure is treatable, but if left untreated over time it can put you at risk for heart attack, stroke, or kidney failure.  You should have your blood pressure re-checked by a primary care provider within the next four weeks.

## 2018-08-13 NOTE — MR AVS SNAPSHOT
After Visit Summary   8/13/2018    Luz Thompson    MRN: [id number]           Patient Information     Date Of Birth          1950        Visit Information        Provider Department      8/13/2018 1:00 PM Vero Quiñonez MD [facility], [place], Sleep Study        Care Instructions      Your BMI is Body mass index is 27.1 kg/(m^2).  Weight management is a personal decision.  If you are interested in exploring weight loss strategies, the following discussion covers the approaches that may be successful. Body mass index (BMI) is one way to tell whether you are at a healthy weight, overweight, or obese. It measures your weight in relation to your height.  A BMI of 18.5 to 24.9 is in the healthy range. A person with a BMI of 25 to 29.9 is considered overweight, and someone with a BMI of 30 or greater is considered obese. More than two-thirds of American adults are considered overweight or obese.  Being overweight or obese increases the risk for further weight gain. Excess weight may lead to heart disease and diabetes.  Creating and following plans for healthy eating and physical activity may help you improve your health.  Weight control is part of healthy lifestyle and includes exercise, emotional health, and healthy eating habits. Careful eating habits lifelong are the mainstay of weight control. Though there are significant health benefits from weight loss, long-term weight loss with diet alone may be very difficult to achieve- studies show long-term success with dietary management in less than 10% of people. Attaining a healthy weight may be especially difficult to achieve in those with severe obesity. In some cases, medications, devices and surgical management might be considered.  What can you do?  If you are overweight or obese and are interested in methods for weight loss, you should discuss this with your provider.     Consider reducing daily calorie intake by 500  calories.     Keep a food journal.     Avoiding skipping meals, consider cutting portions instead.    Diet combined with exercise helps maintain muscle while optimizing fat loss. Strength training is particularly important for building and maintaining muscle mass. Exercise helps reduce stress, increase energy, and improves fitness. Increasing exercise without diet control, however, may not burn enough calories to loose weight.       Start walking three days a week 10-20 minutes at a time    Work towards walking thirty minutes five days a week     Eventually, increase the speed of your walking for 1-2 minutes at time    In addition, we recommend that you review healthy lifestyles and methods for weight loss available through the National Institutes of Health patient information sites:  http://win.niddk.nih.gov/publications/index.htm    And look into health and wellness programs that may be available through your health insurance provider, employer, local community center, or kristie club.    Weight management plan: Patient was referred to their PCP to discuss a diet and exercise plan.     Your blood pressure was checked while you were in clinic today.  Please read the guidelines below about what these numbers mean and what you should do about them.  Your systolic blood pressure is the top number.  This is the pressure when the heart is pumping.  Your diastolic blood pressure is the bottom number.  This is the pressure in between beats.  If your systolic blood pressure is less than 120 and your diastolic blood pressure is less than 80, then your blood pressure is normal. There is nothing more that you need to do about it  If your systolic blood pressure is 120-139 or your diastolic blood pressure is 80-89, your blood pressure may be higher than it should be.  You should have your blood pressure re-checked within a year by a primary care provider.  If your systolic blood pressure is 140 or greater or your diastolic blood  pressure is 90 or greater, you may have high blood pressure.  High blood pressure is treatable, but if left untreated over time it can put you at risk for heart attack, stroke, or kidney failure.  You should have your blood pressure re-checked by a primary care provider within the next four weeks.                Follow-ups after your visit        Follow-up notes from your care team     Return if symptoms worsen or fail to improve.      Your next 10 appointments already scheduled     Aug 15, 2018 11:30 AM CDT   (Arrive by 11:15 AM)   RETURN ATRIAL FIBULATION VISIT with LIZ Sauceda [facility] ([facility])    [address]  [address]  [facility] [id number] [phone]            Aug 20, 2018 12:30 PM CDT   Office Visit with Trisha Timmons RD   [facility], [place],  Diabetes Education ([facility])    [address] [place] [address]  [address]  [facility] [id number] [phone]           Bring a current list of meds and any records pertaining to this visit. For Physicals, please bring immunization records and any forms needing to be filled out. Please arrive 10 minutes early to complete paperwork.            Aug 27, 2018  1:15 PM CDT   Return Visit with Randell Mejia MD   [facility] ([facility])    [address] [place] [id number] [phone]            Sep 14, 2018 10:00 AM CDT   CT CHEST W/O CONTRAST with UCCT2   [facility] [place] [facility] ([facility])    [address]  1st Floor  [facility] [id number] [phone]           Please bring any scans or X-rays taken at other hospitals, if similar tests were done. Also bring a list of your medicines, including vitamins, minerals and over-the-counter drugs. It is safest to leave personal items at home.  Be sure to tell your doctor:   If you have any allergies.   If there s any  chance you are pregnant.   If you are breastfeeding.  You do not need to do anything special to prepare for this exam.  Please wear loose clothing, such as a sweat suit or jogging clothes. Avoid snaps, zippers and other metal. We may ask you to undress and put on a hospital gown.            Sep 14, 2018 11:00 AM CDT   (Arrive by 10:45 AM)   Return Visit with LIZ Gamboa CNP   [facility] ([facility])    [address]  [address]  [facility] [address] [phone]            May 15, 2019 10:30 AM CDT   (Arrive by 10:15 AM)   Return Visit with Crystal Montero MD   [facility] ([facility])    [address]  [address]  [facility] [address] [phone]              Who to contact     If you have questions or need follow up information about today's clinic visit or your schedule please [facility] directly at [phone].  Normal or non-critical lab and imaging results will be communicated to you by [email or web address], letter or phone within 4 business days after the clinic has received the results. If you do not hear from us within 7 days, please contact the clinic through Client Outlookt or phone. If you have a critical or abnormal lab result, we will notify you by phone as soon as possible.  Submit refill requests through Visionnaire or call your pharmacy and they will forward the refill request to us. Please allow 3 business days for your refill to be completed.          Additional Information About Your Visit        [email or web address] Information     Visionnaire gives you secure access to your electronic health record. If you see a primary care provider, you can also send messages to your care team and make appointments. If you have questions, please call your primary care clinic.  If you do not have a primary care provider, please call [phone] and they will assist  "you.        Care EveryWhere ID     This is your Care EveryWhere ID. This could be used by other organizations to access your [place] medical records  [id number]        Your Vitals Were     Pulse Respirations Height Last Period Pulse Oximetry BMI (Body Mass Index)    72 16 1.702 m (5' 7\") 06/01/1988 (Approximate) 96% 27.1 kg/m2       Blood Pressure from Last 3 Encounters:   08/13/18 159/82   08/06/18 146/81   07/30/18 173/74    Weight from Last 3 Encounters:   08/13/18 78.5 kg (173 lb)   08/06/18 79.2 kg (174 lb 8 oz)   07/30/18 77.1 kg (170 lb)              Today, you had the following     No orders found for display         Today's Medication Changes          These changes are accurate as of 8/13/18  1:56 PM.  If you have any questions, ask your nurse or doctor.               These medicines have changed or have updated prescriptions.        Dose/Directions    sirolimus 1 MG Tabs tablet   This may have changed:  Another medication with the same name was removed. Continue taking this medication, and follow the directions you see here.   Used for:  Liver replaced by transplant (H)   Changed by:  Vero Quiñonez MD        Dose:  1 mg   Take 1 tablet (1 mg) by mouth every other day   Quantity:  45 tablet   Refills:  3                Primary Care Provider Office Phone # Fax #    Jennifer Amparo Barraza [phone]       [address] [address] [facility] [address]        Equal Access to Services     [facility] AH: Hadii aad ku hadasho Soomaali, waaxda luqadaha, qaybta kaalmada adeegyada, waxay kayla lu . So [facility] [phone].    ATENCIÓN: Si habla cindy, tiene a jason disposición servicios gratuitos de asistencia lingüística. Llame al [phone].    We comply with applicable federal civil rights laws and [place] laws. We do not discriminate on the basis of race, color, national origin, age, disability, sex, sexual orientation, or gender identity.          "   Thank you!     Thank you for choosing [facility], [place], SLEEP STUDY  for your care. Our goal is always to provide you with excellent care. Hearing back from our patients is one way we can continue to improve our services. Please take a few minutes to complete the written survey that you may receive in the mail after your visit with us. Thank you!             Your Updated Medication List - Protect others around you: Learn how to safely use, store and throw away your medicines at www.disposemymeds.org.          This list is accurate as of 8/13/18  1:56 PM.  Always use your most recent med list.                   Brand Name Dispense Instructions for use Diagnosis    acetaminophen 500 MG Caps      Take 1,000 mg by mouth nightly as needed Take 500-1,000 mg by mouth every 6 hours if needed.        ARTIFICIAL TEARS OP      Apply 1 drop to eye as needed        BENEFIBER Powd      2 teaspoons daily        blood glucose monitoring meter device kit    no brand specified    1 kit    Use to test blood sugar 2times daily or as directed.    Type 2 diabetes mellitus with stage 3 chronic kidney disease, without long-term current use of insulin (H)       blood glucose monitoring test strip    no brand specified    200 each    Use to test blood sugar 2 times daily, before breakfast and before bedtime    Type 2 diabetes mellitus with stage 3 chronic kidney disease, without long-term current use of insulin (H)       calcitRIOL 0.5 MCG capsule    ROCALTROL    90 capsule    Take 1 capsule (0.5 mcg) by mouth daily    Postablative hypoparathyroidism (H)       ciprofloxacin-dexamethasone otic suspension    CIPRODEX    5.6 mL    Place 4 drops Into the left ear 2 times daily    Other infective chronic otitis externa of left ear, Tympanic membrane perforation, left, Sensorineural hearing loss (SNHL) of both ears       clotrimazole 1 % cream    LOTRIMIN     Apply topically 2 times daily as needed Reported on 4/25/2017        ELIQUIS 2.5 MG  tablet   Generic drug:  apixaban ANTICOAGULANT      Take 2.5 mg by mouth 2 times daily        estradiol 0.1 MG/GM cream    ESTRACE VAGINAL    42.5 g    Place 2 g vaginally twice a week    Atrophic vaginitis       ezetimibe 10 MG tablet    ZETIA    90 tablet    TAKE 1 TABLET BY MOUTH EVERY DAY OR AS DIRECTED BY DR WOLF    Hyperlipidemia LDL goal <70, Benign essential hypertension       ferrous gluconate 324 (38 Fe) MG tablet    FERGON    90 tablet    Take 1 tablet (324 mg) by mouth 3 times daily (with meals)    Liver replaced by transplant (H)       folic acid 1 MG tablet    FOLVITE    100 tablet    Take 1 tablet (1 mg) by mouth daily    Liver replaced by transplant (H)       furosemide 20 MG tablet    LASIX    45 tablet    TAKE 1/2 TABLET BY MOUTH EVERY DAY    CKD (chronic kidney disease) stage 3, GFR 30-59 ml/min, Liver replaced by transplant (H)       hydrALAZINE 25 MG tablet    APRESOLINE    270 tablet    Take 0.5 tablets (12.5 mg) by mouth 3 times daily as needed , if systolic blood pressure is more than 140 mmHg.    HTN (hypertension)       ketoconazole 2 % cream    NIZORAL    15 g    Apply topically 2 times daily To angles of mouth    Angular cheilitis       LOVAZA 1 g capsule   Generic drug:  omega-3 acid ethyl esters     360 capsule    Take 1 capsule (1 g) by mouth 2 times daily    Hypertriglyceridemia       meclizine 25 MG tablet    ANTIVERT    30 tablet    Take 1 tablet (25 mg) by mouth as needed    Dizziness       metoprolol succinate 50 MG 24 hr tablet    TOPROL-XL    90 tablet    Take 1 tablet (50 mg) by mouth daily    Paroxysmal atrial fibrillation (H)       neomycin-polymyxin-hydrocortisone 3.5-06617-0 otic suspension    CORTISPORIN    10 mL    Place 4 drops Into the left ear 4 times daily    Acute swimmer's ear of left side       nitroFURantoin (macrocrystal-monohydrate) 100 MG capsule    MACROBID    14 capsule    Take 1 capsule (100 mg) by mouth 2 times daily For one week at onset of UTI symptoms     Urinary tract infection without hematuria, site unspecified       predniSONE 5 MG tablet    DELTASONE    90 tablet    Take 1 tablet (5 mg) by mouth daily    Thyroid cancer (H), Liver replaced by transplant (H)       PRESERVISION AREDS 2 Caps      Take 1 capsule by mouth 2 times daily        sertraline 50 MG tablet    ZOLOFT    90 tablet    Take 1 tablet (50 mg) by mouth daily    Adjustment disorder with depressed mood       sirolimus 1 MG Tabs tablet     45 tablet    Take 1 tablet (1 mg) by mouth every other day    Liver replaced by transplant (H)       SUMAtriptan 50 MG tablet    IMITREX    30 tablet    Take 1 tablet (50 mg) by mouth at onset of headache for migraine repeat after 2 hours if needed.    Migraine without aura and without status migrainosus, not intractable       * SYNTHROID 150 MCG tablet   Generic drug:  levothyroxine      Take 225 mcg by mouth on Mondays        * levothyroxine 150 MCG tablet    SYNTHROID    120 tablet    Take 150mcg by mouth daily 6 days a week & 225mcg on Mondays    Malignant neoplasm of thyroid gland (H)       triamcinolone 0.1 % cream    KENALOG     Apply topically 2 times daily as needed for irritation        ursodiol 250 MG tablet    ACTIGALL    180 tablet    Take 1 tablet (250 mg) by mouth 2 times daily    Liver replaced by transplant (H)       voriconazole 200 MG tablet    VFEND    60 tablet    Take 1 tablet (200 mg) by mouth 2 times daily    Coccidioidal pneumonitis (H)       * Notice:  This list has 2 medication(s) that are the same as other medications prescribed for you. Read the directions carefully, and ask your doctor or other care provider to review them with you.

## 2018-08-14 NOTE — PROGRESS NOTES
DICTATED THE SLEEP CLINIC VISIT NOTE FOR TODAY.  Vero Quiñonez MD   of Medicine,  Division of Pulmonary/Sleep Medicine  [facility].

## 2018-08-15 ENCOUNTER — OFFICE VISIT (OUTPATIENT)
Dept: CARDIOLOGY | Facility: CLINIC | Age: 68
End: 2018-08-15
Attending: NURSE PRACTITIONER
Payer: MEDICARE

## 2018-08-15 VITALS
OXYGEN SATURATION: 99 % | DIASTOLIC BLOOD PRESSURE: 75 MMHG | SYSTOLIC BLOOD PRESSURE: 148 MMHG | HEART RATE: 74 BPM | BODY MASS INDEX: 26.52 KG/M2 | WEIGHT: 175 LBS | HEIGHT: 68 IN

## 2018-08-15 DIAGNOSIS — Z86.73 H/O: CVA (CEREBROVASCULAR ACCIDENT): ICD-10-CM

## 2018-08-15 DIAGNOSIS — I48.0 PAROXYSMAL ATRIAL FIBRILLATION (H): Primary | ICD-10-CM

## 2018-08-15 DIAGNOSIS — I10 BENIGN ESSENTIAL HYPERTENSION: ICD-10-CM

## 2018-08-15 DIAGNOSIS — E78.49 OTHER HYPERLIPIDEMIA: ICD-10-CM

## 2018-08-15 PROCEDURE — 93005 ELECTROCARDIOGRAM TRACING: CPT | Mod: ZF

## 2018-08-15 PROCEDURE — 93010 ELECTROCARDIOGRAM REPORT: CPT | Mod: ZP | Performed by: INTERNAL MEDICINE

## 2018-08-15 PROCEDURE — G0463 HOSPITAL OUTPT CLINIC VISIT: HCPCS | Mod: 25,ZF

## 2018-08-15 PROCEDURE — 99214 OFFICE O/P EST MOD 30 MIN: CPT | Mod: ZP | Performed by: NURSE PRACTITIONER

## 2018-08-15 ASSESSMENT — PAIN SCALES - GENERAL: PAINLEVEL: NO PAIN (0)

## 2018-08-15 NOTE — PROGRESS NOTES
Electrophysiology Clinic Note  HPI:   Ms. Thompson is a 68 year old female who has a past medical history significant for HTN,HLD, PAF (CHADSVASC 5 on Eliquis), CKD, PBC s/p OTL 2002, CVA, Sjogren's, osteoporosis, and vasovagal syncope. She presents today for follow up.     She was diagnosed with PAF in 2017 when she was hospitalized for coccidioidomycosis.  Echo and NM Alanis scan at that time were normal. Anticoagulation was discussed with her and she elected not to pursue at that time. She subsequently had 2 more episodes of palpitations associated with SOB and anxiety. Longest episode lasting 3 hours. She saw Dr. Gilmore in 5/2017 and agreed to start Eliquis and Toprol XL at that time. She underwent colonoscopy in 8/2017 which was complicated by colon perforation necessitating temporarily holding her Eliquis. She did eventually resume Eliquis and has been doing well without bleeding issues. Of note she was on statin in the past which caused muscle aches and bone pain and has subsequently stopped.    She presents today for follow up. She reports feeling well. She has had a difficult year She underwent a divorce and was homeless for a period of time,but now has apartment in [place]. She did have an episode of syncope during a hot shower a few months ago. This was felt to be vagal in nature.  She did not sustain any significant injuries. She denies any chest pain/pressures, dizziness, lightheadedness, worsening shortness of breath, leg/ankle swelling, PND, orthopnea, palpitations, or syncopal symptoms. Echo from 5/2018 showed LVEF 60-% with mild/moderate MR. Presenting 12 lead ECG shows NSR Vent Rate 68 bpm,  ms, QRS 82 ms, QTc 433 ms. Current cardiac medications include: Zetia, Lasix, Eliquis, Toprol XL, and Hydralazine.     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      Diverticulosis of sigmoid colon 12/21/2013     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     Received Rituxan during Summer of 2016     H/O esophageal varices      Hearing loss      Heart murmur 4/4/2012     History of DVT (deep vein thrombosis)      History of [facility] fever      History of thyroid cancer 9/25/2012     Hyperlipidemia 4/10/2012    Says that she does not have it anymore, not on meds     Hyperlipidemia LDL goal <70      Hypertension goal BP (blood pressure) < 140/80 11/06/2013     Hypertriglyceridemia      Liver replaced by transplant (H) 10/17/2011    Dr. Gentry Ramirez, 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        blood glucose monitoring (NO BRAND SPECIFIED) meter device kit Use to test blood sugar 2times daily or as directed. 1 kit 0     blood glucose monitoring (NO BRAND SPECIFIED) test strip Use to test blood sugar 2 times daily, before breakfast and before bedtime 200 each 3     calcitRIOL (ROCALTROL) 0.5 MCG capsule Take 1 capsule (0.5 mcg) by mouth daily 90 capsule 0     ciprofloxacin-dexamethasone " (CIPRODEX) otic suspension Place 4 drops Into the left ear 2 times daily 5.6 mL 0     clotrimazole (LOTRIMIN) 1 % cream Apply topically 2 times daily as needed Reported on 4/25/2017       ELIQUIS 2.5 MG tablet Take 2.5 mg by mouth 2 times daily        estradiol (ESTRACE VAGINAL) 0.1 MG/GM cream Place 2 g vaginally twice a week 42.5 g 11     ezetimibe (ZETIA) 10 MG tablet TAKE 1 TABLET BY MOUTH EVERY DAY OR AS DIRECTED BY DR WOLF 90 tablet 3     ferrous gluconate (FERGON) 324 (38 Fe) MG tablet Take 1 tablet (324 mg) by mouth 3 times daily (with meals) 90 tablet 0     folic acid (FOLVITE) 1 MG tablet Take 1 tablet (1 mg) by mouth daily 100 tablet 3     furosemide (LASIX) 20 MG tablet TAKE 1/2 TABLET BY MOUTH EVERY DAY 45 tablet 3     hydrALAZINE (APRESOLINE) 25 MG tablet Take 0.5 tablets (12.5 mg) by mouth 3 times daily as needed , if systolic blood pressure is more than 140 mmHg. 270 tablet 3     Hypromellose (ARTIFICIAL TEARS OP) Apply 1 drop to eye as needed       ketoconazole (NIZORAL) 2 % cream Apply topically 2 times daily To angles of mouth 15 g 0     levothyroxine (SYNTHROID) 150 MCG tablet Take 150mcg by mouth daily 6 days a week & 225mcg on Mondays 120 tablet 0     levothyroxine (SYNTHROID) 150 MCG tablet Take 225 mcg by mouth on Mondays       meclizine (ANTIVERT) 25 MG tablet Take 1 tablet (25 mg) by mouth as needed 30 tablet 11     metoprolol (TOPROL-XL) 50 MG 24 hr tablet Take 1 tablet (50 mg) by mouth daily 90 tablet 3     Multiple Vitamins-Minerals (PRESERVISION AREDS 2) CAPS Take 1 capsule by mouth 2 times daily       neomycin-polymyxin-hydrocortisone (CORTISPORIN) 3.5-59814-7 otic suspension Place 4 drops Into the left ear 4 times daily 10 mL 0     nitroFURantoin, macrocrystal-monohydrate, (MACROBID) 100 MG capsule Take 1 capsule (100 mg) by mouth 2 times daily For one week at onset of UTI symptoms 14 capsule 6     omega-3 acid ethyl esters (LOVAZA) 1 g capsule Take 1 capsule (1 g) by mouth 2 times  daily 360 capsule 3     predniSONE (DELTASONE) 5 MG tablet Take 1 tablet (5 mg) by mouth daily 90 tablet 3     RAPAMUNE (BRAND) 1 MG PO TABLET Take 1 tablet (1 mg) by mouth every other day 45 tablet 3     sertraline (ZOLOFT) 50 MG tablet Take 1 tablet (50 mg) by mouth daily 90 tablet 3     SUMAtriptan (IMITREX) 50 MG tablet Take 1 tablet (50 mg) by mouth at onset of headache for migraine repeat after 2 hours if needed. 30 tablet 3     triamcinolone (KENALOG) 0.1 % cream Apply topically 2 times daily as needed for irritation       ursodiol (ACTIGALL) 250 MG tablet Take 1 tablet (250 mg) by mouth 2 times daily 180 tablet 3     voriconazole (VFEND) 200 MG tablet Take 1 tablet (200 mg) by mouth 2 times daily 60 tablet 2     Wheat Dextrin (BENEFIBER) POWD 2 teaspoons daily         PAST SURGICAL HISTORY:  Past Surgical History:   Procedure Laterality Date     APPENDECTOMY  1961     BIOPSY       CATARACT IOL, RT/LT      RE12/19/2013, LE12/10/2013 - Toric lenses     CHOLECYSTECTOMY  1991     COLONOSCOPY  3/10/2014    Procedure: COLONOSCOPY;;  Surgeon: Gentry Ramirez MD;  Location: [facility]     ear drum repair       ENDOBRONCHIAL ULTRASOUND FLEXIBLE N/A 9/29/2017    Procedure: ENDOBRONCHIAL ULTRASOUND FLEXIBLE;  Flexible Bronchoscopy, Endobronchial Ultrasound, Transbronchial Needle Aspiration ;  Surgeon: Eden Clinton MD;  Location: [facility]     ENDOSCOPIC RETROGRADE CHOLANGIOPANCREATOGRAM  9/19/2013    Procedure: ENDOSCOPIC RETROGRADE CHOLANGIOPANCREATOGRAM;  Endoscopic Retrograde Cholangiopancreatogram with single balloon enteroscopy, ballon sweep of bile duct;  Surgeon: Brett Membreno MD;  Location: [facility]      KNEE SCOPE,MED/LAT MENISECTOMY  8/10/12    Right, partial medial menisectomy only     KNEE SURGERY  1966    R knee     PICC INSERTION  9/18/2013    4fr SL PASV PICC, 40cm (1cm external) in the R basilic vein w/ tip in the low SVC     PICC INSERTION  2/21/2014    5 fr DL BioFlo Navilyst PICC, 46 cm (3 cm external)  in the L basilic vein w/ tip in the SVC RA junction.     THYROIDECTOMY  3/2010     TRANSPLANT LIVER RECIPIENT LIVING UNRELATED         ALLERGIES:     Allergies   Allergen Reactions     Fluconazole Hives and Itching     Ciprofloxacin Anxiety and Other (See Comments)     Irregular heart beat     Azithromycin Itching     Benadryl [Diphenhydramine Hcl]      Insomnia      Cellcept Diarrhea     Ciprofloxacin Other (See Comments)     Insomnia, mood lability     Codeine      Psych disturbance     Codeine      Diphenhydramine Other (See Comments)     Doxycycline      Lansoprazole Diarrhea     Levaquin [Levofloxacin] Other (See Comments)     Headache, hyperactivity     Lisinopril Cough     Methotrexate      Sores     Methotrexate      Morphine Sulfate Itching     Mycophenolate Diarrhea     No Clinical Screening - See Comments      Simvastatin Muscle Pain (Myalgia)     severe     Cephalexin Rash     Fever and skin burning     Penicillin G Itching and Rash     Tolectin [Nsaids] Rash     Tramadol Rash       FAMILY HISTORY:  Family History   Problem Relation Age of Onset     Hypertension Mother      Endometrial Cancer Mother      Hyperlipidemia Mother      Prostate Cancer Father      Macular Degeneration Father      Cancer - colorectal Maternal Grandmother      in her 80's, has surgery and removal of part of kidney,  at age 98     HEART DISEASE Maternal Grandfather       at 98     Glaucoma Maternal Grandfather      Cerebrovascular Disease Paternal Grandmother      in her 80's     Hypertension Paternal Grandmother      HEART DISEASE Paternal Grandfather      MI     Alzheimer Disease Paternal Grandfather      Allergies Son      Neurologic Disorder Daughter      Migraines     Breast Cancer Other      Anesthesia Reaction No family hx of      Crohn Disease No family hx of      Ulcerative Colitis No family hx of        SOCIAL HISTORY:  Social History   Substance Use Topics     Smoking status: Former Smoker     Packs/day:  "1.00     Years: 18.00     Types: Cigarettes     Quit date: 4/12/1985     Smokeless tobacco: Never Used     Alcohol use 0.0 oz/week     0 Standard drinks or equivalent per week      Comment: rare - \"I toast at weddings\"       ROS:   A comprehensive 10 point review of systems negative other than as mentioned in HPI.  Exam:  /75 (BP Location: Left arm, Patient Position: Chair, Cuff Size: Adult Regular)  Pulse 74  Ht 1.715 m (5' 7.5\")  Wt 79.4 kg (175 lb)  LMP 06/01/1988 (Approximate)  SpO2 99%  BMI 27 kg/m2  GENERAL APPEARANCE: alert and no distress  HEENT: no icterus, no xanthelasmas, normal pupil size and reaction, normal palate, mucosa moist, no central cyanosis  NECK: no adenopathy, no asymmetry, masses, or scars, thyroid normal to palpation and no bruits, JVP not elevated  RESPIRATORY: lungs clear to auscultation - no rales, rhonchi or wheezes, no use of accessory muscles, no retractions, respirations are unlabored, normal respiratory rate  CARDIOVASCULAR: regular rhythm, normal S1 with physiologic split S2, no S3 or S4 and no murmur, click or rub, precordium quiet with normal PMI.  ABDOMEN: soft, non tender, bowel sounds normal, non-distended  EXTREMITIES: peripheral pulses normal, no edema  NEURO: alert and oriented to person/place/time, normal speech, gait and affect  SKIN: no ecchymoses, no rashes  PSYCH: normal affect, cooperative    Labs:  Reviewed.     Testing/Procedures:  PULMONARY FUNCTION TESTS:   PFT Latest Ref Rng & Units 4/18/2018   FVC L 2.63   FEV1 L 2.22   FVC% % 80   FEV1% % 88         5/2018 ECHOCARDIOGRAM:   Interpretation Summary     1. The left ventricle is normal in structure, function and size. The visual  ejection fraction is estimated at 60%.  2. The right ventricle is normal in structure, function and size.  3. There is mild to moderate (1-2+) mitral regurgitation.  4. There is no atrial shunt seen. A contrast injection (Bubble Study) was  performed that was negative for flow " across the interatrial septum.     No significant change from echo 2015.    Assessment and Plan:   Ms. Thompson is a 68 year old female who has a past medical history significant for HTN,HLD, PAF (CHADSVASC 5 on Eliquis), CKD, PBC s/p OTL , CVA, Sjogren's, osteoporosis, and vasovagal syncope. She presents today for follow up for PAF. She was diagnosed with PAF in  when she was hospitalized for coccidioidomycosis.  Echo and NM Alanis scan at that time were normal. Anticoagulation was discussed with her and she elected not to pursue at that time. She subsequently had 2 more episodes of palpitations associated with SOB and anxiety. Longest episode lasting 3 hours. She saw Dr. Gilmore in 2017 and agreed to start Eliquis and Toprol XL at that time. She underwent colonoscopy in 2017 which was complicated by colon perforation necessitating temporarily holding her Eliquis. She did eventually resume Eliquis and has been doing well without bleeding issues. Of note she was on statin in the past which caused muscle aches and bone pain and has subsequently stopped.  She followed with cardiology and has been on Zetia and lovaza. She has had a difficult year She underwent a divorce and was homeless for a period of time,but now has apartment in [place]. She did have an episode of syncope during a hot shower a few months ago. This was felt to be vagal in nature.  She did not sustain any significant injuries. Echo from 2018 showed LVEF 60-% with mild/moderate MR. Presenting 12 lead ECG shows NSR Vent Rate 68 bpm,  ms, QRS 82 ms, QTc 433 ms.   Paroxysmal Atrial Fibrillation:  We discussed in detail with the patient management/treatment options for Robert includin. Stroke Prophylaxis:  CHADSVASC=+age, +gender, +HTN, ++CVA  5, corresponding to a 6.7% annual stroke / systemic emolism event rate. indicating need for long term oral anticoagulation.  She is appropriately on Eliquis. No bleeding issues.   2. Rate  Control: Continue Toprol XL.   3. Rhythm Control: Cardioversion, Antiarrhythmics and/or ablation are options for rhythm control. She is doing well with low burden and no significant symptoms. No compelling indication for rhythm control at this time.     4. Risk Factor Management: Continue Zetia, maintain good BP control, and RASHIDA evaluation as indicated.      Follow up in 1 year.     The patient states understanding and is agreeable with plan.   LIZ Og CNP  Pager: [phone]  CC  SELF, REFERRED

## 2018-08-15 NOTE — LETTER
8/15/2018    RE: Luz Thompson  [address]  [place] [address]     Dear Colleague,    Thank you for the opportunity to participate in the care of your patient, Luz Thompson, at the [facility] at [facility]. Please see a copy of my visit note below.    Electrophysiology Clinic Note  HPI:   Ms. Thompson is a 68 year old female who has a past medical history significant for HTN,HLD, PAF (CHADSVASC 5 on Eliquis), CKD, PBC s/p OTL 2002, CVA, Sjogren's, osteoporosis, and vasovagal syncope. She presents today for follow up.     She was diagnosed with PAF in 2017 when she was hospitalized for coccidioidomycosis.  Echo and NM Alanis scan at that time were normal. Anticoagulation was discussed with her and she elected not to pursue at that time. She subsequently had 2 more episodes of palpitations associated with SOB and anxiety. Longest episode lasting 3 hours. She saw Dr. Gilmore in 5/2017 and agreed to start Eliquis and Toprol XL at that time. She underwent colonoscopy in 8/2017 which was complicated by colon perforation necessitating temporarily holding her Eliquis. She did eventually resume Eliquis and has been doing well without bleeding issues. Of note she was on statin in the past which caused muscle aches and bone pain and has subsequently stopped.    She presents today for follow up. She reports feeling well. She has had a difficult year She underwent a divorce and was homeless for a period of time,but now has apartment in [place]. She did have an episode of syncope during a hot shower a few months ago. This was felt to be vagal in nature.  She did not sustain any significant injuries. She denies any chest pain/pressures, dizziness, lightheadedness, worsening shortness of breath, leg/ankle swelling, PND, orthopnea, palpitations, or syncopal symptoms. Echo from 5/2018 showed LVEF 60-% with mild/moderate MR. Presenting 12 lead ECG shows NSR Vent  "Rate 68 bpm,  ms, QRS 82 ms, QTc 433 ms. Current cardiac medications include: Zetia, Lasix, Eliquis, Toprol XL, and Hydralazine.     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      Diverticulosis of sigmoid colon 12/21/2013     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     Received Rituxan during Summer of 2016     H/O esophageal varices      Hearing loss      Heart murmur 4/4/2012     History of DVT (deep vein thrombosis)      History of [facility] fever      History of thyroid cancer 9/25/2012     Hyperlipidemia 4/10/2012    Says that she does not have it anymore, not on meds     Hyperlipidemia LDL goal <70      Hypertension goal BP (blood pressure) < 140/80 11/06/2013     Hypertriglyceridemia      Liver replaced by transplant (H) 10/17/2011    Dr. 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        blood glucose monitoring (NO BRAND SPECIFIED) meter device kit Use to " test blood sugar 2times daily or as directed. 1 kit 0     blood glucose monitoring (NO BRAND SPECIFIED) test strip Use to test blood sugar 2 times daily, before breakfast and before bedtime 200 each 3     calcitRIOL (ROCALTROL) 0.5 MCG capsule Take 1 capsule (0.5 mcg) by mouth daily 90 capsule 0     ciprofloxacin-dexamethasone (CIPRODEX) otic suspension Place 4 drops Into the left ear 2 times daily 5.6 mL 0     clotrimazole (LOTRIMIN) 1 % cream Apply topically 2 times daily as needed Reported on 4/25/2017       ELIQUIS 2.5 MG tablet Take 2.5 mg by mouth 2 times daily        estradiol (ESTRACE VAGINAL) 0.1 MG/GM cream Place 2 g vaginally twice a week 42.5 g 11     ezetimibe (ZETIA) 10 MG tablet TAKE 1 TABLET BY MOUTH EVERY DAY OR AS DIRECTED BY DR WOLF 90 tablet 3     ferrous gluconate (FERGON) 324 (38 Fe) MG tablet Take 1 tablet (324 mg) by mouth 3 times daily (with meals) 90 tablet 0     folic acid (FOLVITE) 1 MG tablet Take 1 tablet (1 mg) by mouth daily 100 tablet 3     furosemide (LASIX) 20 MG tablet TAKE 1/2 TABLET BY MOUTH EVERY DAY 45 tablet 3     hydrALAZINE (APRESOLINE) 25 MG tablet Take 0.5 tablets (12.5 mg) by mouth 3 times daily as needed , if systolic blood pressure is more than 140 mmHg. 270 tablet 3     Hypromellose (ARTIFICIAL TEARS OP) Apply 1 drop to eye as needed       ketoconazole (NIZORAL) 2 % cream Apply topically 2 times daily To angles of mouth 15 g 0     levothyroxine (SYNTHROID) 150 MCG tablet Take 150mcg by mouth daily 6 days a week & 225mcg on Mondays 120 tablet 0     levothyroxine (SYNTHROID) 150 MCG tablet Take 225 mcg by mouth on Mondays       meclizine (ANTIVERT) 25 MG tablet Take 1 tablet (25 mg) by mouth as needed 30 tablet 11     metoprolol (TOPROL-XL) 50 MG 24 hr tablet Take 1 tablet (50 mg) by mouth daily 90 tablet 3     Multiple Vitamins-Minerals (PRESERVISION AREDS 2) CAPS Take 1 capsule by mouth 2 times daily       neomycin-polymyxin-hydrocortisone (CORTISPORIN) 3.5-09669-9  otic suspension Place 4 drops Into the left ear 4 times daily 10 mL 0     nitroFURantoin, macrocrystal-monohydrate, (MACROBID) 100 MG capsule Take 1 capsule (100 mg) by mouth 2 times daily For one week at onset of UTI symptoms 14 capsule 6     omega-3 acid ethyl esters (LOVAZA) 1 g capsule Take 1 capsule (1 g) by mouth 2 times daily 360 capsule 3     predniSONE (DELTASONE) 5 MG tablet Take 1 tablet (5 mg) by mouth daily 90 tablet 3     RAPAMUNE (BRAND) 1 MG PO TABLET Take 1 tablet (1 mg) by mouth every other day 45 tablet 3     sertraline (ZOLOFT) 50 MG tablet Take 1 tablet (50 mg) by mouth daily 90 tablet 3     SUMAtriptan (IMITREX) 50 MG tablet Take 1 tablet (50 mg) by mouth at onset of headache for migraine repeat after 2 hours if needed. 30 tablet 3     triamcinolone (KENALOG) 0.1 % cream Apply topically 2 times daily as needed for irritation       ursodiol (ACTIGALL) 250 MG tablet Take 1 tablet (250 mg) by mouth 2 times daily 180 tablet 3     voriconazole (VFEND) 200 MG tablet Take 1 tablet (200 mg) by mouth 2 times daily 60 tablet 2     Wheat Dextrin (BENEFIBER) POWD 2 teaspoons daily         PAST SURGICAL HISTORY:  Past Surgical History:   Procedure Laterality Date     APPENDECTOMY  1961     BIOPSY       CATARACT IOL, RT/LT      RE12/19/2013, LE12/10/2013 - Toric lenses     CHOLECYSTECTOMY  1991     COLONOSCOPY  3/10/2014    Procedure: COLONOSCOPY;;  Surgeon: Gentry Ramirez MD;  Location:  GI     ear drum repair       ENDOBRONCHIAL ULTRASOUND FLEXIBLE N/A 9/29/2017    Procedure: ENDOBRONCHIAL ULTRASOUND FLEXIBLE;  Flexible Bronchoscopy, Endobronchial Ultrasound, Transbronchial Needle Aspiration ;  Surgeon: Eden Clinton MD;  Location:  OR     ENDOSCOPIC RETROGRADE CHOLANGIOPANCREATOGRAM  9/19/2013    Procedure: ENDOSCOPIC RETROGRADE CHOLANGIOPANCREATOGRAM;  Endoscopic Retrograde Cholangiopancreatogram with single balloon enteroscopy, ballon sweep of bile duct;  Surgeon: Brett Membreno MD;  Location:  [facility] OR     HC KNEE SCOPE,MED/LAT MENISECTOMY  8/10/12    Right, partial medial menisectomy only     KNEE SURGERY  1966    R knee     PICC INSERTION  2013    4fr SL PASV PICC, 40cm (1cm external) in the R basilic vein w/ tip in the low SVC     PICC INSERTION  2014    5 fr DL BioFlo Navilyst PICC, 46 cm (3 cm external) in the L basilic vein w/ tip in the SVC RA junction.     THYROIDECTOMY  3/2010     TRANSPLANT LIVER RECIPIENT LIVING UNRELATED         ALLERGIES:     Allergies   Allergen Reactions     Fluconazole Hives and Itching     Ciprofloxacin Anxiety and Other (See Comments)     Irregular heart beat     Azithromycin Itching     Benadryl [Diphenhydramine Hcl]      Insomnia      Cellcept Diarrhea     Ciprofloxacin Other (See Comments)     Insomnia, mood lability     Codeine      Psych disturbance     Codeine      Diphenhydramine Other (See Comments)     Doxycycline      Lansoprazole Diarrhea     Levaquin [Levofloxacin] Other (See Comments)     Headache, hyperactivity     Lisinopril Cough     Methotrexate      Sores     Methotrexate      Morphine Sulfate Itching     Mycophenolate Diarrhea     No Clinical Screening - See Comments      Simvastatin Muscle Pain (Myalgia)     severe     Cephalexin Rash     Fever and skin burning     Penicillin G Itching and Rash     Tolectin [Nsaids] Rash     Tramadol Rash       FAMILY HISTORY:  Family History   Problem Relation Age of Onset     Hypertension Mother      Endometrial Cancer Mother      Hyperlipidemia Mother      Prostate Cancer Father      Macular Degeneration Father      Cancer - colorectal Maternal Grandmother      in her 80's, has surgery and removal of part of kidney,  at age 98     HEART DISEASE Maternal Grandfather       at 98     Glaucoma Maternal Grandfather      Cerebrovascular Disease Paternal Grandmother      in her 80's     Hypertension Paternal Grandmother      HEART DISEASE Paternal Grandfather      MI     Alzheimer Disease Paternal  "Grandfather      Allergies Son      Neurologic Disorder Daughter      Migraines     Breast Cancer Other      Anesthesia Reaction No family hx of      Crohn Disease No family hx of      Ulcerative Colitis No family hx of        SOCIAL HISTORY:  Social History   Substance Use Topics     Smoking status: Former Smoker     Packs/day: 1.00     Years: 18.00     Types: Cigarettes     Quit date: 4/12/1985     Smokeless tobacco: Never Used     Alcohol use 0.0 oz/week     0 Standard drinks or equivalent per week      Comment: rare - \"I toast at weddings\"       ROS:   A comprehensive 10 point review of systems negative other than as mentioned in HPI.  Exam:  /75 (BP Location: Left arm, Patient Position: Chair, Cuff Size: Adult Regular)  Pulse 74  Ht 1.715 m (5' 7.5\")  Wt 79.4 kg (175 lb)  LMP 06/01/1988 (Approximate)  SpO2 99%  BMI 27 kg/m2  GENERAL APPEARANCE: alert and no distress  HEENT: no icterus, no xanthelasmas, normal pupil size and reaction, normal palate, mucosa moist, no central cyanosis  NECK: no adenopathy, no asymmetry, masses, or scars, thyroid normal to palpation and no bruits, JVP not elevated  RESPIRATORY: lungs clear to auscultation - no rales, rhonchi or wheezes, no use of accessory muscles, no retractions, respirations are unlabored, normal respiratory rate  CARDIOVASCULAR: regular rhythm, normal S1 with physiologic split S2, no S3 or S4 and no murmur, click or rub, precordium quiet with normal PMI.  ABDOMEN: soft, non tender, bowel sounds normal, non-distended  EXTREMITIES: peripheral pulses normal, no edema  NEURO: alert and oriented to person/place/time, normal speech, gait and affect  SKIN: no ecchymoses, no rashes  PSYCH: normal affect, cooperative    Labs:  Reviewed.     Testing/Procedures:  PULMONARY FUNCTION TESTS:   PFT Latest Ref Rng & Units 4/18/2018   FVC L 2.63   FEV1 L 2.22   FVC% % 80   FEV1% % 88         5/2018 ECHOCARDIOGRAM:   Interpretation Summary     1. The left ventricle is " normal in structure, function and size. The visual  ejection fraction is estimated at 60%.  2. The right ventricle is normal in structure, function and size.  3. There is mild to moderate (1-2+) mitral regurgitation.  4. There is no atrial shunt seen. A contrast injection (Bubble Study) was  performed that was negative for flow across the interatrial septum.     No significant change from echo 7/2015.    Assessment and Plan:   Ms. Thompson is a 68 year old female who has a past medical history significant for HTN,HLD, PAF (CHADSVASC 5 on Eliquis), CKD, PBC s/p OTL 2002, CVA, Sjogren's, osteoporosis, and vasovagal syncope. She presents today for follow up for PAF. She was diagnosed with PAF in 2017 when she was hospitalized for coccidioidomycosis.  Echo and NM Alanis scan at that time were normal. Anticoagulation was discussed with her and she elected not to pursue at that time. She subsequently had 2 more episodes of palpitations associated with SOB and anxiety. Longest episode lasting 3 hours. She saw Dr. Gilmore in 5/2017 and agreed to start Eliquis and Toprol XL at that time. She underwent colonoscopy in 8/2017 which was complicated by colon perforation necessitating temporarily holding her Eliquis. She did eventually resume Eliquis and has been doing well without bleeding issues. Of note she was on statin in the past which caused muscle aches and bone pain and has subsequently stopped.   She followed with cardiology and has been on Zetia and lovaza. She has had a difficult year She underwent a divorce and was homeless for a period of time,but now has apartment in [place]. She did have an episode of syncope during a hot shower a few months ago. This was felt to be vagal in nature.  She did not sustain any significant injuries. Echo from 5/2018 showed LVEF 60-% with mild/moderate MR. Presenting 12 lead ECG shows NSR Vent Rate 68 bpm,  ms, QRS 82 ms, QTc 433 ms.   Paroxysmal Atrial Fibrillation:  We discussed  in detail with the patient management/treatment options for Robert includin. Stroke Prophylaxis:  CHADSVASC=+age, +gender, +HTN, ++CVA  5, corresponding to a 6.7% annual stroke / systemic emolism event rate. indicating need for long term oral anticoagulation.  She is appropriately on Eliquis. No bleeding issues.   2. Rate Control: Continue Toprol XL.   3. Rhythm Control: Cardioversion, Antiarrhythmics and/or ablation are options for rhythm control. She is doing well with low burden and no significant symptoms. No compelling indication for rhythm control at this time.     4. Risk Factor Management: Continue Zetia, maintain good BP control, and RASHIDA evaluation as indicated.      Follow up in 1 year.     The patient states understanding and is agreeable with plan.   LIZ Og CNP  Pager: [phone]  CC  SELF, REFERRED

## 2018-08-15 NOTE — MR AVS SNAPSHOT
After Visit Summary   8/15/2018    Luz Thompson    MRN: [id number]           Patient Information     Date Of Birth          1950        Visit Information        Provider Department      8/15/2018 11:30 AM Taniya Wolff APRN [facility]        Today's Diagnoses     Atrial fibrillation (H)    -  1       Follow-ups after your visit        Your next 10 appointments already scheduled     Aug 20, 2018 12:30 PM CDT   Office Visit with Trisha Timmons RD   [facility] [place],  Diabetes Education ([facility])    [address] [place] [id number] [phone]           Bring a current list of meds and any records pertaining to this visit. For Physicals, please bring immunization records and any forms needing to be filled out. Please arrive 10 minutes early to complete paperwork.            Aug 27, 2018  1:15 PM CDT   Return Visit with Randell Mejia MD   [facility] ([facility]    [address] [place] [id number] [phone]            Sep 14, 2018 10:00 AM CDT   CT CHEST W/O CONTRAST with UCCT2   [facility] ([facility])    [address]  1st Floor  [facility] [place] [facility] [id number] [phone]           Please bring any scans or X-rays taken at other hospitals, if similar tests were done. Also bring a list of your medicines, including vitamins, minerals and over-the-counter drugs. It is safest to leave personal items at home.  Be sure to tell your doctor:   If you have any allergies.   If there s any chance you are pregnant.   If you are breastfeeding.  You do not need to do anything special to prepare for this exam.  Please wear loose clothing, such as a sweat suit or jogging clothes. Avoid snaps, zippers and other metal. We may ask you to undress and put on a hospital gown.            Sep 14, 2018 11:00 AM CDT  "  (Arrive by 10:45 AM)   Return Visit with LIZ Gamboa CNP   [facility] ([facility] [place])    [address]  [address]  [place] [address] [phone]            May 15, 2019 10:30 AM CDT   (Arrive by 10:15 AM)   Return Visit with Crystal Montero MD   [facility] ([facility])    [address]  [address]  [place] [address] [phone]              Who to contact     If you have questions or need follow up information about today's clinic visit or your schedule please contact [facility] directly at [phone].  Normal or non-critical lab and imaging results will be communicated to you by MyChart, letter or phone within 4 business days after the clinic has received the results. If you do not hear from us within 7 days, please contact the clinic through MyChart or phone. If you have a critical or abnormal lab result, we will notify you by phone as soon as possible.  Submit refill requests through [employer] or call your pharmacy and they will forward the refill request to us. Please allow 3 business days for your refill to be completed.          Additional Information About Your Visit        [employer] Information     [employer] gives you secure access to your electronic health record. If you see a primary care provider, you can also send messages to your care team and make appointments. If you have questions, please call your primary care clinic.  If you do not have a primary care provider, please call [phone] and they will assist you.        Care EveryWhere ID     This is your Care EveryWhere ID. This could be used by other organizations to access your [place] medical records  [id number]        Your Vitals Were     Pulse Height Last Period Pulse Oximetry BMI (Body Mass Index)       74 1.715 m (5' 7.5\") 06/01/1988 (Approximate) 99% 27 kg/m2        " Blood Pressure from Last 3 Encounters:   08/15/18 148/75   08/13/18 159/82   08/06/18 146/81    Weight from Last 3 Encounters:   08/15/18 79.4 kg (175 lb)   08/13/18 78.5 kg (173 lb)   08/06/18 79.2 kg (174 lb 8 oz)              We Performed the Following     EKG 12-lead, tracing only (Same Day)        Primary Care Provider Office Phone # Fax #    Jennifer Amparo Barraza [phone]       [address]  [facility] [address]        Equal Access to Services     [facility]: Hadii aad ku hadasho Soomaali, waaxda luqadaha, qaybta kaalmada adeegyada, waxaudrey garza haypacon дмитрий lu . So [facility] [phone].    ATENCIÓN: Si habla español, tiene a jason disposición servicios gratuitos de asistencia lingüística. Llame al [phone].    We comply with applicable federal civil rights laws and [place] laws. We do not discriminate on the basis of race, color, national origin, age, disability, sex, sexual orientation, or gender identity.            Thank you!     Thank you for choosing [facility]  for your care. Our goal is always to provide you with excellent care. Hearing back from our patients is one way we can continue to improve our services. Please take a few minutes to complete the written survey that you may receive in the mail after your visit with us. Thank you!             Your Updated Medication List - Protect others around you: Learn how to safely use, store and throw away your medicines at www.disposemymeds.org.          This list is accurate as of 8/15/18 11:59 PM.  Always use your most recent med list.                   Brand Name Dispense Instructions for use Diagnosis    acetaminophen 500 MG Caps      Take 1,000 mg by mouth nightly as needed Take 500-1,000 mg by mouth every 6 hours if needed.        ARTIFICIAL TEARS OP      Apply 1 drop to eye as needed        BENEFIBER Powd      2 teaspoons daily        blood glucose monitoring meter device kit    no brand specified    1  kit    Use to test blood sugar 2times daily or as directed.    Type 2 diabetes mellitus with stage 3 chronic kidney disease, without long-term current use of insulin (H)       blood glucose monitoring test strip    no brand specified    200 each    Use to test blood sugar 2 times daily, before breakfast and before bedtime    Type 2 diabetes mellitus with stage 3 chronic kidney disease, without long-term current use of insulin (H)       calcitRIOL 0.5 MCG capsule    ROCALTROL    90 capsule    Take 1 capsule (0.5 mcg) by mouth daily    Postablative hypoparathyroidism (H)       ciprofloxacin-dexamethasone otic suspension    CIPRODEX    5.6 mL    Place 4 drops Into the left ear 2 times daily    Other infective chronic otitis externa of left ear, Tympanic membrane perforation, left, Sensorineural hearing loss (SNHL) of both ears       clotrimazole 1 % cream    LOTRIMIN     Apply topically 2 times daily as needed Reported on 4/25/2017        ELIQUIS 2.5 MG tablet   Generic drug:  apixaban ANTICOAGULANT      Take 2.5 mg by mouth 2 times daily        estradiol 0.1 MG/GM cream    ESTRACE VAGINAL    42.5 g    Place 2 g vaginally twice a week    Atrophic vaginitis       ezetimibe 10 MG tablet    ZETIA    90 tablet    TAKE 1 TABLET BY MOUTH EVERY DAY OR AS DIRECTED BY DR WOLF    Hyperlipidemia LDL goal <70, Benign essential hypertension       ferrous gluconate 324 (38 Fe) MG tablet    FERGON    90 tablet    Take 1 tablet (324 mg) by mouth 3 times daily (with meals)    Liver replaced by transplant (H)       folic acid 1 MG tablet    FOLVITE    100 tablet    Take 1 tablet (1 mg) by mouth daily    Liver replaced by transplant (H)       furosemide 20 MG tablet    LASIX    45 tablet    TAKE 1/2 TABLET BY MOUTH EVERY DAY    CKD (chronic kidney disease) stage 3, GFR 30-59 ml/min, Liver replaced by transplant (H)       hydrALAZINE 25 MG tablet    APRESOLINE    270 tablet    Take 0.5 tablets (12.5 mg) by mouth 3 times daily as needed  , if systolic blood pressure is more than 140 mmHg.    HTN (hypertension)       ketoconazole 2 % cream    NIZORAL    15 g    Apply topically 2 times daily To angles of mouth    Angular cheilitis       LOVAZA 1 g capsule   Generic drug:  omega-3 acid ethyl esters     360 capsule    Take 1 capsule (1 g) by mouth 2 times daily    Hypertriglyceridemia       meclizine 25 MG tablet    ANTIVERT    30 tablet    Take 1 tablet (25 mg) by mouth as needed    Dizziness       metoprolol succinate 50 MG 24 hr tablet    TOPROL-XL    90 tablet    Take 1 tablet (50 mg) by mouth daily    Paroxysmal atrial fibrillation (H)       neomycin-polymyxin-hydrocortisone 3.5-66447-1 otic suspension    CORTISPORIN    10 mL    Place 4 drops Into the left ear 4 times daily    Acute swimmer's ear of left side       nitroFURantoin (macrocrystal-monohydrate) 100 MG capsule    MACROBID    14 capsule    Take 1 capsule (100 mg) by mouth 2 times daily For one week at onset of UTI symptoms    Urinary tract infection without hematuria, site unspecified       predniSONE 5 MG tablet    DELTASONE    90 tablet    Take 1 tablet (5 mg) by mouth daily    Thyroid cancer (H), Liver replaced by transplant (H)       PRESERVISION AREDS 2 Caps      Take 1 capsule by mouth 2 times daily        sertraline 50 MG tablet    ZOLOFT    90 tablet    Take 1 tablet (50 mg) by mouth daily    Adjustment disorder with depressed mood       sirolimus 1 MG Tabs tablet     45 tablet    Take 1 tablet (1 mg) by mouth every other day    Liver replaced by transplant (H)       SUMAtriptan 50 MG tablet    IMITREX    30 tablet    Take 1 tablet (50 mg) by mouth at onset of headache for migraine repeat after 2 hours if needed.    Migraine without aura and without status migrainosus, not intractable       * SYNTHROID 150 MCG tablet   Generic drug:  levothyroxine      Take 225 mcg by mouth on Mondays        * levothyroxine 150 MCG tablet    SYNTHROID    120 tablet    Take 150mcg by mouth daily 6  days a week & 225mcg on Mondays    Malignant neoplasm of thyroid gland (H)       triamcinolone 0.1 % cream    KENALOG     Apply topically 2 times daily as needed for irritation        ursodiol 250 MG tablet    ACTIGALL    180 tablet    Take 1 tablet (250 mg) by mouth 2 times daily    Liver replaced by transplant (H)       voriconazole 200 MG tablet    VFEND    60 tablet    Take 1 tablet (200 mg) by mouth 2 times daily    Coccidioidal pneumonitis (H)       * Notice:  This list has 2 medication(s) that are the same as other medications prescribed for you. Read the directions carefully, and ask your doctor or other care provider to review them with you.

## 2018-08-16 LAB — INTERPRETATION ECG - MUSE: NORMAL

## 2018-08-22 ENCOUNTER — ALLIED HEALTH/NURSE VISIT (OUTPATIENT)
Dept: EDUCATION SERVICES | Facility: CLINIC | Age: 68
End: 2018-08-22
Attending: NUTRITIONIST
Payer: MEDICARE

## 2018-08-22 DIAGNOSIS — N18.30 TYPE 2 DIABETES MELLITUS WITH STAGE 3 CHRONIC KIDNEY DISEASE, WITHOUT LONG-TERM CURRENT USE OF INSULIN (H): Primary | ICD-10-CM

## 2018-08-22 DIAGNOSIS — E11.22 TYPE 2 DIABETES MELLITUS WITH STAGE 3 CHRONIC KIDNEY DISEASE, WITHOUT LONG-TERM CURRENT USE OF INSULIN (H): Primary | ICD-10-CM

## 2018-08-22 PROCEDURE — G0108 DIAB MANAGE TRN  PER INDIV: HCPCS | Performed by: NUTRITIONIST

## 2018-08-22 NOTE — PROGRESS NOTES
"Diabetes Self-Management Education & Support    Diabetes Education Self Management & Training    SUBJECTIVE/OBJECTIVE:  Presents for: Initial Assessment for new diagnosis  Accompanied by: Other  Diabetes education in the past 24mo: No  Focus of Visit: Healthy Eating  Diabetes type: Type 2  Disease course: Stable  How confident are you filling out medical forms by yourself:: Not Assessed  Other concerns:: None  Cultural Influences/Ethnic Background:  Not  or     Diabetes Symptoms & Complications  None     Patient Problem List and Family Medical History reviewed for relevant medical history, current medical status, and diabetes risk factors.    Vitals:  LMP 06/01/1988 (Approximate)  Estimated body mass index is 27 kg/(m^2) as calculated from the following:    Height as of 8/15/18: 1.715 m (5' 7.5\").    Weight as of 8/15/18: 79.4 kg (175 lb).   Last 3 BP:   BP Readings from Last 3 Encounters:   08/15/18 148/75   08/13/18 159/82   08/06/18 146/81       History   Smoking Status     Former Smoker     Packs/day: 1.00     Years: 18.00     Types: Cigarettes     Quit date: 4/12/1985   Smokeless Tobacco     Never Used       Labs:  Lab Results   Component Value Date    A1C 6.8 05/18/2018     Lab Results   Component Value Date    GLC 79 07/30/2018     Lab Results   Component Value Date    LDL  05/18/2018     Cannot estimate LDL when triglyceride exceeds 400 mg/dL     05/18/2018     HDL Cholesterol   Date Value Ref Range Status   05/18/2018 49 (L) >49 mg/dL Final   ]  GFR Estimate   Date Value Ref Range Status   07/30/2018 49 (L) >60 mL/min/1.7m2 Final     Comment:     Non  GFR Calc     GFR Estimate If Black   Date Value Ref Range Status   07/30/2018 59 (L) >60 mL/min/1.7m2 Final     Comment:      GFR Calc     Lab Results   Component Value Date    CR 1.11 07/30/2018     No results found for: MICROALBUMIN    Healthy Eating  Patient on a regular basis: Eats 3 meals a day  Meals " "include: Breakfast, Lunch, Dinner  Beverages: Water  Has patient met with a dietitian in the past?: Yes  Her appetite has been low lately.  She lives currently with friends. Will be traveling to [place] as a \"snowbird\" soon until May  Her friend is with her at the appointment today and has an interest in diet, tries to eat healthy, has studied nutrition as well.  Breakfast might be chobani, coffee and sugar free creamer or instant oatmeal with skim milk  Lunch is boost  Dinner is a lean cuisine or she eats with her friends and in that case it is a balanced meal. Yesterday was chicken with wild rice/quinoa mix and vegetables.   She doesn't eat sweets very often.    Being Active  Exercise:: Yes  Days per week of moderate to strenuous exercise (like a brisk walk): 2    Monitoring  Did patient bring glucose meter to appointment? : No  Home Glucose (Sugar) Monitorin-2 times per day  She states they have all been at goal    Taking Medications  No    Current Treatments: Diet    Problem Solving  Hypoglycemia Frequency: Never     Healthy Coping  Emotional response to diabetes: Ready to learn, Acceptance, Confidence diabetes can be controlled, Concern for health and well-being  Stage of change: ACTION (Actively working towards change)  Patient Activation Measure Survey Score:  SANDIP Score (Last Two) 2012   SANDIP Raw Score 41 38   Activation Score 63.2 52.9   SANDIP Level 3 2       ASSESSMENT:  Patient with newly diagnosed type 2 diabetes diet controlled. She would like to learn more about diet today.  Her goal is to be healthy and control glucose.    Goals        Diet    Have 3 meals a day     Notes - Note created  2018  1:18 PM by Trisha Timmons RD    Goal Statement: Have three meals per day, use the plate method to include 30-60 grams of carbohydrate at meals and 15-30 grams of carbohydrate at snacks. Include a lean source of protein, heart healthy fat and vegetables at meals.  Measure of Success: using " above method majority of the time  Supportive Steps to Achieve: Plan ahead, meal plan, support from friend whom she lives with, read labels  Barriers: none  Strengths: motivated   Patient expressed understanding of goal: yes, verbalized             General    Being Active (pt-stated)     Notes - Note created  8/22/2018  1:21 PM by Trisha Timmons RD    Continue working with a  twice per week and walk on other days for a goal of 30 minutes.      Healthy Eating (pt-stated)     Notes - Note created  8/22/2018  1:20 PM by Trisha Timmons RD    Goal Statement: choose high fiber carbohydrate most of the time  Supportive Steps to Achieve: read labels and meal plan  Strengths: motivated and demonstrates understanding                  Patient's most recent   Lab Results   Component Value Date    A1C 6.8 05/18/2018    is meeting goal of <7.0    INTERVENTION:   Diabetes knowledge and skills assessment:     Patient is knowledgeable in diabetes management concepts related to: Monitoring and Reducing Risks    Patient needs further education on the following diabetes management concepts: Healthy Eating    Based on learning assessment above, most appropriate setting for further diabetes education would be: Individual setting.    Education provided today on:  AADE Self-Care Behaviors:  Healthy Eating: carbohydrate counting, consistency in amount, composition, and timing of food intake, heart healthy diet, portion control, plate planning method and label reading and benefits of fiber  Monitoring: purpose, individual blood glucose targets and frequency of monitoring    Opportunities for ongoing education and support in diabetes-self management were discussed.    Pt verbalized understanding of concepts discussed and recommendations provided today.       Education Materials Provided:  [facility] Taking Charge of Your Diabetes Book and Carbohydrate Counting  Snack and Meal Ideas for meal planning    PLAN:  See Patient Instructions  for co-developed, patient-stated behavior change goals.  AVS printed and provided to patient today. See Follow-Up section for recommended follow-up.    Trisha Timmons RD  Time Spent: 60 minutes  Encounter Type: Individual    Any diabetes medication dose changes were made via the CDE Protocol and Collaborative Practice Agreement with the patient's referring provider. A copy of this encounter was shared with the provider.

## 2018-08-22 NOTE — MR AVS SNAPSHOT
After Visit Summary   8/22/2018    Luz Thompson    MRN: [id number]           Patient Information     Date Of Birth          1950        Visit Information        Provider Department      8/22/2018 10:30 AM Trisha Timmons RD [facility], [place],  Diabetes Education         Follow-ups after your visit        Your next 10 appointments already scheduled     Aug 27, 2018  1:15 PM CDT   Return Visit with Randell Mejia MD   [facility] ([facility])    [address] [place] [id number] [phone]            Sep 14, 2018 10:00 AM CDT   CT CHEST W/O CONTRAST with UCCT2   [facility] CT ([facility])    [address]  1st Floor  [facility] [address] [phone]           Please bring any scans or X-rays taken at other hospitals, if similar tests were done. Also bring a list of your medicines, including vitamins, minerals and over-the-counter drugs. It is safest to leave personal items at home.  Be sure to tell your doctor:   If you have any allergies.   If there s any chance you are pregnant.   If you are breastfeeding.  You do not need to do anything special to prepare for this exam.  Please wear loose clothing, such as a sweat suit or jogging clothes. Avoid snaps, zippers and other metal. We may ask you to undress and put on a hospital gown.            Sep 14, 2018 11:00 AM CDT   (Arrive by 10:45 AM)   Return Visit with LIZ Gamboa [facility] ([facility])    [address]  [address]  [facility] [address] [phone]            May 15, 2019 10:30 AM CDT   (Arrive by 10:15 AM)   Return Visit with Crystal Montero MD   [facility] ([facility])    [address]  [address]  [facility] [id number] [phone]            May 22, 2019  1:30 PM CDT    Diabetes Education with Trisha Timmons RD   [facility]  Diabetes Education ([facility])    [facility] [address]  [address] [place] [address] [phone]              Who to contact     If you have questions or need follow up information about today's clinic visit or your schedule please contact [facility],  DIABETES EDUCATION directly at [phone].  Normal or non-critical lab and imaging results will be communicated to you by MetroGameshart, letter or phone within 4 business days after the clinic has received the results. If you do not hear from us within 7 days, please contact the clinic through ConsiderCt or phone. If you have a critical or abnormal lab result, we will notify you by phone as soon as possible.  Submit refill requests through Yovigo or call your pharmacy and they will forward the refill request to us. Please allow 3 business days for your refill to be completed.          Additional Information About Your Visit        Yovigo Information     Yovigo gives you secure access to your electronic health record. If you see a primary care provider, you can also send messages to your care team and make appointments. If you have questions, please call your primary care clinic.  If you do not have a primary care provider, please call [phone] and they will assist you.        Care EveryWhere ID     This is your Care EveryWhere ID. This could be used by other organizations to access your [facility] medical records  [id number]        Your Vitals Were     Last Period                   06/01/1988 (Approximate)            Blood Pressure from Last 3 Encounters:   08/15/18 148/75   08/13/18 159/82   08/06/18 146/81    Weight from Last 3 Encounters:   08/15/18 79.4 kg (175 lb)   08/13/18 78.5 kg (173 lb)   08/06/18 79.2 kg (174 lb 8 oz)              Today, you had the following     No orders found for display       Primary Care Provider Office Phone #  Fax #    Jennifer Amparo Barraza [phone]       [address]  [place] [facility] [address]        Equal Access to Services     GENE NOWAK : Valerie brayan guo karsono Somelecioali, waaxda luqadaha, qaybta kaalmada adeegyada, etta coley laInésaugustus hardy. So [facility] [phone].    ATENCIÓN: Si habla español, tiene a ajson disposición servicios gratuitos de asistencia lingüística. Llame al [phone].    We comply with applicable federal civil rights laws and [place] laws. We do not discriminate on the basis of race, color, national origin, age, disability, sex, sexual orientation, or gender identity.            Thank you!     Thank you for choosing [facility]  DIABETES EDUCATION  for your care. Our goal is always to provide you with excellent care. Hearing back from our patients is one way we can continue to improve our services. Please take a few minutes to complete the written survey that you may receive in the mail after your visit with us. Thank you!             Your Updated Medication List - Protect others around you: Learn how to safely use, store and throw away your medicines at www.disposemymeds.org.          This list is accurate as of 8/22/18 11:56 AM.  Always use your most recent med list.                   Brand Name Dispense Instructions for use Diagnosis    acetaminophen 500 MG Caps      Take 1,000 mg by mouth nightly as needed Take 500-1,000 mg by mouth every 6 hours if needed.        ARTIFICIAL TEARS OP      Apply 1 drop to eye as needed        BENEFIBER Powd      2 teaspoons daily        blood glucose monitoring meter device kit    no brand specified    1 kit    Use to test blood sugar 2times daily or as directed.    Type 2 diabetes mellitus with stage 3 chronic kidney disease, without long-term current use of insulin (H)       blood glucose monitoring test strip    no brand specified    200 each    Use to test blood sugar 2 times daily, before breakfast and before bedtime     Type 2 diabetes mellitus with stage 3 chronic kidney disease, without long-term current use of insulin (H)       calcitRIOL 0.5 MCG capsule    ROCALTROL    90 capsule    Take 1 capsule (0.5 mcg) by mouth daily    Postablative hypoparathyroidism (H)       ciprofloxacin-dexamethasone otic suspension    CIPRODEX    5.6 mL    Place 4 drops Into the left ear 2 times daily    Other infective chronic otitis externa of left ear, Tympanic membrane perforation, left, Sensorineural hearing loss (SNHL) of both ears       clotrimazole 1 % cream    LOTRIMIN     Apply topically 2 times daily as needed Reported on 4/25/2017        ELIQUIS 2.5 MG tablet   Generic drug:  apixaban ANTICOAGULANT      Take 2.5 mg by mouth 2 times daily        estradiol 0.1 MG/GM cream    ESTRACE VAGINAL    42.5 g    Place 2 g vaginally twice a week    Atrophic vaginitis       ezetimibe 10 MG tablet    ZETIA    90 tablet    TAKE 1 TABLET BY MOUTH EVERY DAY OR AS DIRECTED BY DR WOLF    Hyperlipidemia LDL goal <70, Benign essential hypertension       ferrous gluconate 324 (38 Fe) MG tablet    FERGON    90 tablet    Take 1 tablet (324 mg) by mouth 3 times daily (with meals)    Liver replaced by transplant (H)       folic acid 1 MG tablet    FOLVITE    100 tablet    Take 1 tablet (1 mg) by mouth daily    Liver replaced by transplant (H)       furosemide 20 MG tablet    LASIX    45 tablet    TAKE 1/2 TABLET BY MOUTH EVERY DAY    CKD (chronic kidney disease) stage 3, GFR 30-59 ml/min, Liver replaced by transplant (H)       hydrALAZINE 25 MG tablet    APRESOLINE    270 tablet    Take 0.5 tablets (12.5 mg) by mouth 3 times daily as needed , if systolic blood pressure is more than 140 mmHg.    HTN (hypertension)       ketoconazole 2 % cream    NIZORAL    15 g    Apply topically 2 times daily To angles of mouth    Angular cheilitis       LOVAZA 1 g capsule   Generic drug:  omega-3 acid ethyl esters     360 capsule    Take 1 capsule (1 g) by mouth 2 times daily     Hypertriglyceridemia       meclizine 25 MG tablet    ANTIVERT    30 tablet    Take 1 tablet (25 mg) by mouth as needed    Dizziness       metoprolol succinate 50 MG 24 hr tablet    TOPROL-XL    90 tablet    Take 1 tablet (50 mg) by mouth daily    Paroxysmal atrial fibrillation (H)       neomycin-polymyxin-hydrocortisone 3.5-91166-7 otic suspension    CORTISPORIN    10 mL    Place 4 drops Into the left ear 4 times daily    Acute swimmer's ear of left side       nitroFURantoin (macrocrystal-monohydrate) 100 MG capsule    MACROBID    14 capsule    Take 1 capsule (100 mg) by mouth 2 times daily For one week at onset of UTI symptoms    Urinary tract infection without hematuria, site unspecified       predniSONE 5 MG tablet    DELTASONE    90 tablet    Take 1 tablet (5 mg) by mouth daily    Thyroid cancer (H), Liver replaced by transplant (H)       PRESERVISION AREDS 2 Caps      Take 1 capsule by mouth 2 times daily        sertraline 50 MG tablet    ZOLOFT    90 tablet    Take 1 tablet (50 mg) by mouth daily    Adjustment disorder with depressed mood       sirolimus 1 MG Tabs tablet     45 tablet    Take 1 tablet (1 mg) by mouth every other day    Liver replaced by transplant (H)       SUMAtriptan 50 MG tablet    IMITREX    30 tablet    Take 1 tablet (50 mg) by mouth at onset of headache for migraine repeat after 2 hours if needed.    Migraine without aura and without status migrainosus, not intractable       * SYNTHROID 150 MCG tablet   Generic drug:  levothyroxine      Take 225 mcg by mouth on Mondays        * levothyroxine 150 MCG tablet    SYNTHROID    120 tablet    Take 150mcg by mouth daily 6 days a week & 225mcg on Mondays    Malignant neoplasm of thyroid gland (H)       triamcinolone 0.1 % cream    KENALOG     Apply topically 2 times daily as needed for irritation        ursodiol 250 MG tablet    ACTIGALL    180 tablet    Take 1 tablet (250 mg) by mouth 2 times daily    Liver replaced by transplant (H)        voriconazole 200 MG tablet    VFEND    60 tablet    Take 1 tablet (200 mg) by mouth 2 times daily    Coccidioidal pneumonitis (H)       * Notice:  This list has 2 medication(s) that are the same as other medications prescribed for you. Read the directions carefully, and ask your doctor or other care provider to review them with you.

## 2018-08-24 DIAGNOSIS — C73 MALIGNANT NEOPLASM OF THYROID GLAND (H): ICD-10-CM

## 2018-08-24 DIAGNOSIS — I48.0 PAROXYSMAL ATRIAL FIBRILLATION (H): ICD-10-CM

## 2018-08-24 DIAGNOSIS — E89.2 POSTABLATIVE HYPOPARATHYROIDISM (H): ICD-10-CM

## 2018-08-27 ENCOUNTER — OFFICE VISIT (OUTPATIENT)
Dept: OTOLARYNGOLOGY | Facility: CLINIC | Age: 68
End: 2018-08-27
Payer: MEDICARE

## 2018-08-27 VITALS
HEIGHT: 67 IN | RESPIRATION RATE: 13 BRPM | OXYGEN SATURATION: 98 % | WEIGHT: 175 LBS | BODY MASS INDEX: 27.47 KG/M2 | HEART RATE: 65 BPM

## 2018-08-27 DIAGNOSIS — H90.3 SENSORINEURAL HEARING LOSS (SNHL) OF BOTH EARS: Primary | ICD-10-CM

## 2018-08-27 DIAGNOSIS — Z98.890 S/P TYMPANOPLASTY: ICD-10-CM

## 2018-08-27 DIAGNOSIS — H72.92 PERFORATION OF TYMPANIC MEMBRANE, LEFT: ICD-10-CM

## 2018-08-27 DIAGNOSIS — H60.392 OTHER INFECTIVE CHRONIC OTITIS EXTERNA OF LEFT EAR: ICD-10-CM

## 2018-08-27 PROCEDURE — 99214 OFFICE O/P EST MOD 30 MIN: CPT | Performed by: OTOLARYNGOLOGY

## 2018-08-27 RX ORDER — CLOTRIMAZOLE 1 G/ML
1 SOLUTION TOPICAL 2 TIMES DAILY
Qty: 28 ML | Refills: 1 | Status: SHIPPED | OUTPATIENT
Start: 2018-08-27 | End: 2019-05-15

## 2018-08-27 RX ORDER — METOPROLOL SUCCINATE 50 MG/1
TABLET, EXTENDED RELEASE ORAL
Qty: 90 TABLET | Refills: 3 | Status: SHIPPED | OUTPATIENT
Start: 2018-08-27 | End: 2018-10-02

## 2018-08-27 NOTE — TELEPHONE ENCOUNTER
calcitRIOL (ROCALTROL) 0.5 MCG      Last Written Prescription Date:  5/23/18  Last Fill Quantity: 90,   # refills: 0  Last Office Visit : 8/6/18  Future Office visit: NONE   Routing refill request to provider for review/approval because:  Drug not on the refill protocol       levothyroxine (SYNTHROID) 150 MCG     Last Written Prescription Date:  5/23/18  Last Fill Quantity: 120,   # refills: 0  Routing refill request to provider for review/approval because:   ABN  TSH     90 DAY RF PENDING

## 2018-08-27 NOTE — LETTER
8/27/2018         RE: Luz Thompson  [address]  [place] [address]        Dear Colleague,    Thank you for referring your patient, Luz Thompson, to the [facility]. Please see a copy of my visit note below.    History of Present Illness - Luz Thompson is a 68 year old female here in close follow up from 7/30/18, to check on the resolution of a LEFT otitis externa and otitis media, with a perforation.    To review, she has had LEFT ear surgery with a tympanoplasty in 1996.  Otherwise no chronic ear disease.  She has also had thyroid surgery and ablation in March 2010.  The main reason she is here is progressive bilateral ear fullness and blockage of her hearing aids with cerumen.    At the March 2014 visit, she had been through quite an ordeal, having been hospitalized for E. Coli sepsis, the source was unknown. No new issues with that problem since then. And after asking her about it, it happened again this past June of 2015. Her past year had been fine, and other than fullness from her cerumen build up, no new ENT issues. No drainage from the ears, no bleeding, no vertigo. She still uses bilateral hearing aids.    About one week prior to the 6/29/18 visit, she noted some drainage from the LEFT ear.  She went to urgent care and was placed on some antibiotics.  Of note, she also got some pink eye.  However, she was not given drops because of a concern for drug interaction.  However, she was then placed on oral antibiotics.  On exam, the LEFT ear was completely filled with purulence, and cellulitic changes were starting in the external ear soft tissues and skin.  I treated her with debridement, ciprodex, and continued to the oral antibiotics.  Also, on exam after debridement I found a 10% perforation of the LEFT tympanic membrane.  At the follow up on 7/16, the infeciton was 90% cleared, but there was still some purulence on the LEFT tympanic membrane and the  perforation was unchanged.  Therefore I had her continue cirpodex and she is here for follow up.    We are hoping this resolves, as she leaves for [place] for the winter, in late September.    At the 7/30/18 visit, we had turned a corner, and the infection was totally cleared. The perforation was smaller as well, leaving only about a 5 % hole.  We were optimistic, and she is here for follow up prior to leaving for [place].    Of note, just two weeks ago she was first diagnosed with Type 2 DM.  She thinks her ear has takena  Turn for the worse.    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     Prophylactic antibiotic     High risk medication use     Statin intolerance     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     Coccidioidal pneumonitis (H)     SNHL (sensorineural hearing loss)     Abnormal liver function tests     Diagnostic skin and sensitization tests     Perforation bowel (H)     Liver replaced by transplant (H)     Adjustment disorder with depressed mood     Type 2 diabetes, HbA1c goal < 7% (H)     Hyperlipidemia LDL goal <70     Hypertension goal BP (blood pressure) < 140/80     Postoperative hypothyroidism     Type 2 diabetes mellitus with stage 3 chronic kidney disease, without long-term current use of insulin (H)     Age-related osteoporosis without current pathological fracture     Perforation of tympanic membrane, left       Current Medications -   Current Outpatient Prescriptions:      acetaminophen 500 MG CAPS, Take 1,000 mg by mouth nightly as needed Take 500-1,000 mg by mouth every 6 hours if needed. , Disp: , Rfl:      blood glucose monitoring (NO BRAND SPECIFIED) meter device kit, Use to test blood sugar 2times daily or as directed., Disp: 1 kit, Rfl: 0     blood glucose monitoring (NO BRAND SPECIFIED)  test strip, Use to test blood sugar 2 times daily, before breakfast and before bedtime, Disp: 200 each, Rfl: 3     calcitRIOL (ROCALTROL) 0.5 MCG capsule, Take 1 capsule (0.5 mcg) by mouth daily, Disp: 90 capsule, Rfl: 0     ciprofloxacin-dexamethasone (CIPRODEX) otic suspension, Place 4 drops Into the left ear 2 times daily, Disp: 5.6 mL, Rfl: 0     clotrimazole (LOTRIMIN) 1 % cream, Apply topically 2 times daily as needed Reported on 4/25/2017, Disp: , Rfl:      ELIQUIS 2.5 MG tablet, Take 2.5 mg by mouth 2 times daily , Disp: , Rfl:      estradiol (ESTRACE VAGINAL) 0.1 MG/GM cream, Place 2 g vaginally twice a week, Disp: 42.5 g, Rfl: 11     ezetimibe (ZETIA) 10 MG tablet, TAKE 1 TABLET BY MOUTH EVERY DAY OR AS DIRECTED BY DR WOLF, Disp: 90 tablet, Rfl: 3     ferrous gluconate (FERGON) 324 (38 Fe) MG tablet, Take 1 tablet (324 mg) by mouth 3 times daily (with meals), Disp: 90 tablet, Rfl: 0     folic acid (FOLVITE) 1 MG tablet, Take 1 tablet (1 mg) by mouth daily, Disp: 100 tablet, Rfl: 3     furosemide (LASIX) 20 MG tablet, TAKE 1/2 TABLET BY MOUTH EVERY DAY, Disp: 45 tablet, Rfl: 3     hydrALAZINE (APRESOLINE) 25 MG tablet, Take 0.5 tablets (12.5 mg) by mouth 3 times daily as needed , if systolic blood pressure is more than 140 mmHg., Disp: 270 tablet, Rfl: 3     Hypromellose (ARTIFICIAL TEARS OP), Apply 1 drop to eye as needed, Disp: , Rfl:      ketoconazole (NIZORAL) 2 % cream, Apply topically 2 times daily To angles of mouth, Disp: 15 g, Rfl: 0     levothyroxine (SYNTHROID) 150 MCG tablet, Take 150mcg by mouth daily 6 days a week & 225mcg on Mondays, Disp: 120 tablet, Rfl: 0     levothyroxine (SYNTHROID) 150 MCG tablet, Take 225 mcg by mouth on Mondays, Disp: , Rfl:      meclizine (ANTIVERT) 25 MG tablet, Take 1 tablet (25 mg) by mouth as needed, Disp: 30 tablet, Rfl: 11     metoprolol (TOPROL-XL) 50 MG 24 hr tablet, Take 1 tablet (50 mg) by mouth daily, Disp: 90 tablet, Rfl: 3     Multiple  Vitamins-Minerals (PRESERVISION AREDS 2) CAPS, Take 1 capsule by mouth 2 times daily, Disp: , Rfl:      neomycin-polymyxin-hydrocortisone (CORTISPORIN) 3.5-59999-4 otic suspension, Place 4 drops Into the left ear 4 times daily, Disp: 10 mL, Rfl: 0     nitroFURantoin, macrocrystal-monohydrate, (MACROBID) 100 MG capsule, Take 1 capsule (100 mg) by mouth 2 times daily For one week at onset of UTI symptoms, Disp: 14 capsule, Rfl: 6     omega-3 acid ethyl esters (LOVAZA) 1 g capsule, Take 1 capsule (1 g) by mouth 2 times daily, Disp: 360 capsule, Rfl: 3     predniSONE (DELTASONE) 5 MG tablet, Take 1 tablet (5 mg) by mouth daily, Disp: 90 tablet, Rfl: 3     RAPAMUNE (BRAND) 1 MG PO TABLET, Take 1 tablet (1 mg) by mouth every other day, Disp: 45 tablet, Rfl: 3     sertraline (ZOLOFT) 50 MG tablet, Take 1 tablet (50 mg) by mouth daily, Disp: 90 tablet, Rfl: 3     SUMAtriptan (IMITREX) 50 MG tablet, Take 1 tablet (50 mg) by mouth at onset of headache for migraine repeat after 2 hours if needed., Disp: 30 tablet, Rfl: 3     triamcinolone (KENALOG) 0.1 % cream, Apply topically 2 times daily as needed for irritation, Disp: , Rfl:      ursodiol (ACTIGALL) 250 MG tablet, Take 1 tablet (250 mg) by mouth 2 times daily, Disp: 180 tablet, Rfl: 3     voriconazole (VFEND) 200 MG tablet, Take 1 tablet (200 mg) by mouth 2 times daily, Disp: 60 tablet, Rfl: 2     Wheat Dextrin (BENEFIBER) POWD, 2 teaspoons daily, Disp: , Rfl:     Allergies -   Allergies   Allergen Reactions     Fluconazole Hives and Itching     Ciprofloxacin Anxiety and Other (See Comments)     Irregular heart beat     Azithromycin Itching     Benadryl [Diphenhydramine Hcl]      Insomnia      Cellcept Diarrhea     Ciprofloxacin Other (See Comments)     Insomnia, mood lability     Codeine      Psych disturbance     Codeine      Diphenhydramine Other (See Comments)     Doxycycline      Lansoprazole Diarrhea     Levaquin [Levofloxacin] Other (See Comments)     Headache,  "hyperactivity     Lisinopril Cough     Methotrexate      Sores     Methotrexate      Morphine Sulfate Itching     Mycophenolate Diarrhea     No Clinical Screening - See Comments      Simvastatin Muscle Pain (Myalgia)     severe     Cephalexin Rash     Fever and skin burning     Penicillin G Itching and Rash     Tolectin [Nsaids] Rash     Tramadol Rash       Social History -   Social History     Social History     Marital status: Legally      Spouse name: Robbin Thompson     Number of children: 4     Years of education: 20     Occupational History     [employer]  [employer]     social work      Self     Social History Main Topics     Smoking status: Former Smoker     Packs/day: 1.00     Years: 18.00     Types: Cigarettes     Quit date: 1985     Smokeless tobacco: Never Used     Alcohol use 0.0 oz/week     0 Standard drinks or equivalent per week      Comment: rare - \"I toast at weddings\"     Drug use: No     Sexual activity: Yes     Partners: Male     Birth control/ protection: Post-menopausal     Other Topics Concern     Exercise Yes     cardio and strengthing     Social History Narrative    She is retired. She and her  travel around the [place] in an [place]. [place] the course of the [place]. 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       Family History -   Family History   Problem Relation Age of Onset     Hypertension Mother      Endometrial Cancer Mother      Hyperlipidemia Mother      Prostate Cancer Father      Macular Degeneration Father      Cancer - colorectal Maternal Grandmother      in her 80's, has surgery and removal of part of kidney,  at age 98     HEART DISEASE Maternal Grandfather       at 98     Glaucoma Maternal Grandfather      Cerebrovascular Disease Paternal Grandmother      in her 80's     Hypertension Paternal Grandmother      HEART " DISEASE Paternal Grandfather      MI     Alzheimer Disease Paternal Grandfather      Allergies Son      Neurologic Disorder Daughter      Migraines     Breast Cancer Other      Anesthesia Reaction No family hx of      Crohn Disease No family hx of      Ulcerative Colitis No family hx of        Review of Systems - As per HPI and PMHx, otherwise 10+ system review of the head and neck, and general constitution is negative.    Physical Exam  [facility] 06/01/1988 (Approximate)    General - The patient is well nourished and well developed, and appears to have good nutritional status.  Alert and oriented to person and place, answers questions and cooperates with examination appropriately.   Head and Face - Normocephalic and atraumatic, with no gross asymmetry noted of the contour of the facial features.  The facial nerve is intact, with strong symmetric movements.  Voice and Breathing - The patient was breathing comfortably without the use of accessory muscles. There was no wheezing, stridor, or stertor.  The patients voice was clear and strong, and had appropriate pitch and quality.  Ears - The RIGHT ear and RIGHT tympanic membrane are healthy and normal.  The LEFT ear canal is totally filled with purulent cheesy material.  I had to suction and got down to the LEFT tympanic membrane which is thick with edema, and the perforation is enlarged to 10-15% again.  Eyes - Extraocular movements intact, and the pupils were reactive to light.  Sclera were not icteric or injected, conjunctiva were pink and moist.        A/P - Luz Thompson is a 68 year old female  (H90.3) Sensorineural hearing loss (SNHL) of both ears  (primary encounter diagnosis)  (Z98.890) S/P tympanoplasty  (H72.92) Perforation of tympanic membrane, left    Very frustrating, but the infection has come back fully.  I will treat with another 2 weeks of ciprodex, as well as clotrimazole solution.  See me back in 2-3 weeks.    And even if things look better, I will  maintain her on drops throughout the winter until she comes back in the spring.      Again, thank you for allowing me to participate in the care of your patient.        Sincerely,        Randell Mejia MD

## 2018-08-27 NOTE — PROGRESS NOTES
History of Present Illness - Luz Thompson is a 68 year old female here in close follow up from 7/30/18, to check on the resolution of a LEFT otitis externa and otitis media, with a perforation.    To review, she has had LEFT ear surgery with a tympanoplasty in 1996.  Otherwise no chronic ear disease.  She has also had thyroid surgery and ablation in March 2010.  The main reason she is here is progressive bilateral ear fullness and blockage of her hearing aids with cerumen.    At the March 2014 visit, she had been through quite an ordeal, having been hospitalized for E. Coli sepsis, the source was unknown. No new issues with that problem since then. And after asking her about it, it happened again this past June of 2015. Her past year had been fine, and other than fullness from her cerumen build up, no new ENT issues. No drainage from the ears, no bleeding, no vertigo. She still uses bilateral hearing aids.    About one week prior to the 6/29/18 visit, she noted some drainage from the LEFT ear.  She went to urgent care and was placed on some antibiotics.  Of note, she also got some pink eye.  However, she was not given drops because of a concern for drug interaction.  However, she was then placed on oral antibiotics.  On exam, the LEFT ear was completely filled with purulence, and cellulitic changes were starting in the external ear soft tissues and skin.  I treated her with debridement, ciprodex, and continued to the oral antibiotics.  Also, on exam after debridement I found a 10% perforation of the LEFT tympanic membrane.  At the follow up on 7/16, the infeciton was 90% cleared, but there was still some purulence on the LEFT tympanic membrane and the perforation was unchanged.  Therefore I had her continue cirpodex and she is here for follow up.    We are hoping this resolves, as she leaves for [place] for the winter, in late September.    At the 7/30/18 visit, we had turned a corner, and the infection was totally  cleared. The perforation was smaller as well, leaving only about a 5 % hole.  We were optimistic, and she is here for follow up prior to leaving for [place].    Of note, just two weeks ago she was first diagnosed with Type 2 DM.  She thinks her ear has takena  Turn for the worse.    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     Prophylactic antibiotic     High risk medication use     Statin intolerance     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     Coccidioidal pneumonitis (H)     SNHL (sensorineural hearing loss)     Abnormal liver function tests     Diagnostic skin and sensitization tests     Perforation bowel (H)     Liver replaced by transplant (H)     Adjustment disorder with depressed mood     Type 2 diabetes, HbA1c goal < 7% (H)     Hyperlipidemia LDL goal <70     Hypertension goal BP (blood pressure) < 140/80     Postoperative hypothyroidism     Type 2 diabetes mellitus with stage 3 chronic kidney disease, without long-term current use of insulin (H)     Age-related osteoporosis without current pathological fracture     Perforation of tympanic membrane, left       Current Medications -   Current Outpatient Prescriptions:      acetaminophen 500 MG CAPS, Take 1,000 mg by mouth nightly as needed Take 500-1,000 mg by mouth every 6 hours if needed. , Disp: , Rfl:      blood glucose monitoring (NO BRAND SPECIFIED) meter device kit, Use to test blood sugar 2times daily or as directed., Disp: 1 kit, Rfl: 0     blood glucose monitoring (NO BRAND SPECIFIED) test strip, Use to test blood sugar 2 times daily, before breakfast and before bedtime, Disp: 200 each, Rfl: 3     calcitRIOL (ROCALTROL) 0.5 MCG capsule, Take 1 capsule (0.5 mcg) by mouth daily, Disp: 90 capsule, Rfl: 0     ciprofloxacin-dexamethasone (CIPRODEX)  otic suspension, Place 4 drops Into the left ear 2 times daily, Disp: 5.6 mL, Rfl: 0     clotrimazole (LOTRIMIN) 1 % cream, Apply topically 2 times daily as needed Reported on 4/25/2017, Disp: , Rfl:      ELIQUIS 2.5 MG tablet, Take 2.5 mg by mouth 2 times daily , Disp: , Rfl:      estradiol (ESTRACE VAGINAL) 0.1 MG/GM cream, Place 2 g vaginally twice a week, Disp: 42.5 g, Rfl: 11     ezetimibe (ZETIA) 10 MG tablet, TAKE 1 TABLET BY MOUTH EVERY DAY OR AS DIRECTED BY DR WOLF, Disp: 90 tablet, Rfl: 3     ferrous gluconate (FERGON) 324 (38 Fe) MG tablet, Take 1 tablet (324 mg) by mouth 3 times daily (with meals), Disp: 90 tablet, Rfl: 0     folic acid (FOLVITE) 1 MG tablet, Take 1 tablet (1 mg) by mouth daily, Disp: 100 tablet, Rfl: 3     furosemide (LASIX) 20 MG tablet, TAKE 1/2 TABLET BY MOUTH EVERY DAY, Disp: 45 tablet, Rfl: 3     hydrALAZINE (APRESOLINE) 25 MG tablet, Take 0.5 tablets (12.5 mg) by mouth 3 times daily as needed , if systolic blood pressure is more than 140 mmHg., Disp: 270 tablet, Rfl: 3     Hypromellose (ARTIFICIAL TEARS OP), Apply 1 drop to eye as needed, Disp: , Rfl:      ketoconazole (NIZORAL) 2 % cream, Apply topically 2 times daily To angles of mouth, Disp: 15 g, Rfl: 0     levothyroxine (SYNTHROID) 150 MCG tablet, Take 150mcg by mouth daily 6 days a week & 225mcg on Mondays, Disp: 120 tablet, Rfl: 0     levothyroxine (SYNTHROID) 150 MCG tablet, Take 225 mcg by mouth on Mondays, Disp: , Rfl:      meclizine (ANTIVERT) 25 MG tablet, Take 1 tablet (25 mg) by mouth as needed, Disp: 30 tablet, Rfl: 11     metoprolol (TOPROL-XL) 50 MG 24 hr tablet, Take 1 tablet (50 mg) by mouth daily, Disp: 90 tablet, Rfl: 3     Multiple Vitamins-Minerals (PRESERVISION AREDS 2) CAPS, Take 1 capsule by mouth 2 times daily, Disp: , Rfl:      neomycin-polymyxin-hydrocortisone (CORTISPORIN) 3.5-88859-8 otic suspension, Place 4 drops Into the left ear 4 times daily, Disp: 10 mL, Rfl: 0     nitroFURantoin,  macrocrystal-monohydrate, (MACROBID) 100 MG capsule, Take 1 capsule (100 mg) by mouth 2 times daily For one week at onset of UTI symptoms, Disp: 14 capsule, Rfl: 6     omega-3 acid ethyl esters (LOVAZA) 1 g capsule, Take 1 capsule (1 g) by mouth 2 times daily, Disp: 360 capsule, Rfl: 3     predniSONE (DELTASONE) 5 MG tablet, Take 1 tablet (5 mg) by mouth daily, Disp: 90 tablet, Rfl: 3     RAPAMUNE (BRAND) 1 MG PO TABLET, Take 1 tablet (1 mg) by mouth every other day, Disp: 45 tablet, Rfl: 3     sertraline (ZOLOFT) 50 MG tablet, Take 1 tablet (50 mg) by mouth daily, Disp: 90 tablet, Rfl: 3     SUMAtriptan (IMITREX) 50 MG tablet, Take 1 tablet (50 mg) by mouth at onset of headache for migraine repeat after 2 hours if needed., Disp: 30 tablet, Rfl: 3     triamcinolone (KENALOG) 0.1 % cream, Apply topically 2 times daily as needed for irritation, Disp: , Rfl:      ursodiol (ACTIGALL) 250 MG tablet, Take 1 tablet (250 mg) by mouth 2 times daily, Disp: 180 tablet, Rfl: 3     voriconazole (VFEND) 200 MG tablet, Take 1 tablet (200 mg) by mouth 2 times daily, Disp: 60 tablet, Rfl: 2     Wheat Dextrin (BENEFIBER) POWD, 2 teaspoons daily, Disp: , Rfl:     Allergies -   Allergies   Allergen Reactions     Fluconazole Hives and Itching     Ciprofloxacin Anxiety and Other (See Comments)     Irregular heart beat     Azithromycin Itching     Benadryl [Diphenhydramine Hcl]      Insomnia      Cellcept Diarrhea     Ciprofloxacin Other (See Comments)     Insomnia, mood lability     Codeine      Psych disturbance     Codeine      Diphenhydramine Other (See Comments)     Doxycycline      Lansoprazole Diarrhea     Levaquin [Levofloxacin] Other (See Comments)     Headache, hyperactivity     Lisinopril Cough     Methotrexate      Sores     Methotrexate      Morphine Sulfate Itching     Mycophenolate Diarrhea     No Clinical Screening - See Comments      Simvastatin Muscle Pain (Myalgia)     severe     Cephalexin Rash     Fever and skin  "burning     Penicillin G Itching and Rash     Tolectin [Nsaids] Rash     Tramadol Rash       Social History -   Social History     Social History     Marital status: Legally      Spouse name: Robbin Thompson     Number of children: 4     Years of education: 20     Occupational History     Guardian Conservator  [employer]     social work      Self     Social History Main Topics     Smoking status: Former Smoker     Packs/day: 1.00     Years: 18.00     Types: Cigarettes     Quit date: 1985     Smokeless tobacco: Never Used     Alcohol use 0.0 oz/week     0 Standard drinks or equivalent per week      Comment: rare - \"I toast at weddings\"     Drug use: No     Sexual activity: Yes     Partners: Male     Birth control/ protection: Post-menopausal     Other Topics Concern     Exercise Yes     cardio and strengthing     Social History Narrative    She is retired. She and her  travel around the [place] in an RV. [place] the course of the winter. 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       Family History -   Family History   Problem Relation Age of Onset     Hypertension Mother      Endometrial Cancer Mother      Hyperlipidemia Mother      Prostate Cancer Father      Macular Degeneration Father      Cancer - colorectal Maternal Grandmother      in her 80's, has surgery and removal of part of kidney,  at age 98     HEART DISEASE Maternal Grandfather       at 98     Glaucoma Maternal Grandfather      Cerebrovascular Disease Paternal Grandmother      in her 80's     Hypertension Paternal Grandmother      HEART DISEASE Paternal Grandfather      MI     Alzheimer Disease Paternal Grandfather      Allergies Son      Neurologic Disorder Daughter      Migraines     Breast Cancer Other      Anesthesia Reaction No family hx of      Crohn Disease No family hx of      Ulcerative " Colitis No family hx of        Review of Systems - As per HPI and PMHx, otherwise 10+ system review of the head and neck, and general constitution is negative.    Physical Exam  LMP 06/01/1988 (Approximate)    General - The patient is well nourished and well developed, and appears to have good nutritional status.  Alert and oriented to person and place, answers questions and cooperates with examination appropriately.   Head and Face - Normocephalic and atraumatic, with no gross asymmetry noted of the contour of the facial features.  The facial nerve is intact, with strong symmetric movements.  Voice and Breathing - The patient was breathing comfortably without the use of accessory muscles. There was no wheezing, stridor, or stertor.  The patients voice was clear and strong, and had appropriate pitch and quality.  Ears - The RIGHT ear and RIGHT tympanic membrane are healthy and normal.  The LEFT ear canal is totally filled with purulent cheesy material.  I had to suction and got down to the LEFT tympanic membrane which is thick with edema, and the perforation is enlarged to 10-15% again.  Eyes - Extraocular movements intact, and the pupils were reactive to light.  Sclera were not icteric or injected, conjunctiva were pink and moist.        A/P - Luz Thompson is a 68 year old female  (H90.3) Sensorineural hearing loss (SNHL) of both ears  (primary encounter diagnosis)  (Z98.890) S/P tympanoplasty  (H72.92) Perforation of tympanic membrane, left    Very frustrating, but the infection has come back fully.  I will treat with another 2 weeks of ciprodex, as well as clotrimazole solution.  See me back in 2-3 weeks.    And even if things look better, I will maintain her on drops throughout the winter until she comes back in the spring.

## 2018-08-30 RX ORDER — LEVOTHYROXINE SODIUM 150 UG/1
TABLET ORAL
Qty: 102 TABLET | Refills: 3 | Status: SHIPPED | OUTPATIENT
Start: 2018-08-30 | End: 2019-08-05

## 2018-08-30 RX ORDER — CALCITRIOL 0.5 UG/1
0.5 CAPSULE, LIQUID FILLED ORAL DAILY
Qty: 90 CAPSULE | Refills: 3 | Status: ON HOLD | OUTPATIENT
Start: 2018-08-30 | End: 2019-08-31

## 2018-09-10 ENCOUNTER — OFFICE VISIT (OUTPATIENT)
Dept: OTOLARYNGOLOGY | Facility: CLINIC | Age: 68
End: 2018-09-10
Payer: MEDICARE

## 2018-09-10 VITALS — OXYGEN SATURATION: 100 % | SYSTOLIC BLOOD PRESSURE: 146 MMHG | DIASTOLIC BLOOD PRESSURE: 68 MMHG | HEART RATE: 72 BPM

## 2018-09-10 DIAGNOSIS — H72.92 TYMPANIC MEMBRANE PERFORATION, LEFT: ICD-10-CM

## 2018-09-10 DIAGNOSIS — H72.92 PERFORATION OF TYMPANIC MEMBRANE, LEFT: Primary | ICD-10-CM

## 2018-09-10 DIAGNOSIS — H90.3 SENSORINEURAL HEARING LOSS (SNHL) OF BOTH EARS: ICD-10-CM

## 2018-09-10 DIAGNOSIS — H60.392 OTHER INFECTIVE CHRONIC OTITIS EXTERNA OF LEFT EAR: ICD-10-CM

## 2018-09-10 PROCEDURE — 99214 OFFICE O/P EST MOD 30 MIN: CPT | Performed by: OTOLARYNGOLOGY

## 2018-09-10 RX ORDER — CIPROFLOXACIN AND DEXAMETHASONE 3; 1 MG/ML; MG/ML
4 SUSPENSION/ DROPS AURICULAR (OTIC)
Qty: 5.6 ML | Refills: 6 | Status: ON HOLD | OUTPATIENT
Start: 2018-09-10 | End: 2019-08-31

## 2018-09-10 NOTE — MR AVS SNAPSHOT
After Visit Summary   9/10/2018    Luz Thompson    MRN: [id number]           Patient Information     Date Of Birth          1950        Visit Information        Provider Department      9/10/2018 1:15 PM Randell Mejia MD [facility]        Today's Diagnoses     Perforation of tympanic membrane, left    -  1    Other infective chronic otitis externa of left ear        Tympanic membrane perforation, left        Sensorineural hearing loss (SNHL) of both ears           Follow-ups after your visit        Your next 10 appointments already scheduled     Sep 12, 2018 12:20 PM CDT   CT CHEST W/O CONTRAST with UCCT2   [facility] ([facility])    [address]  1st Floor  [facility] [address] [phone]           How do I prepare for my exam? (Food and drink instructions) No Food and Drink Restrictions.  How do I prepare for my exam? (Other instructions) You do not need to do anything special to prepare for this exam. For a sinus scan: Use your nose spray (nasal decongestant spray) as directed.  What should I wear: Please wear loose clothing, such as a sweat suit or jogging clothes. Avoid snaps, zippers and other metal. We may ask you to undress and put on a hospital gown.  How long does the exam take: Most scans take less than 20 minutes.  What should I bring: Please bring any scans or X-rays taken at other hospitals, if similar tests were done. Also bring a list of your medicines, including vitamins, minerals and over-the-counter drugs. It is safest to leave personal items at home.  Do I need a : No  is needed.  What do I need to tell my doctor? Be sure to tell your doctor: * If you have any allergies. * If there s any chance you are pregnant. * If you are breastfeeding.  What should I do after the exam: No restrictions, You may resume normal activities.  What is this test: A CT (computed tomography) scan is a  series of pictures that allows us to look inside your body. The scanner creates images of the body in cross sections, much like slices of bread. This helps us see any problems more clearly.  Who should I call with questions: If you have any questions, please call the Imaging Department where you will have your exam. Directions, parking instructions, and other information is available on our website, [email or web address].            Sep 12, 2018 12:40 PM CDT   (Arrive by 12:25 PM)   Return Visit with LIZ Bush   [facility] ([facility])    [address]  [address]  [place] [address]   [phone]            May 15, 2019 10:30 AM CDT   (Arrive by 10:15 AM)   Return Visit with Crystal Montero MD   [facility] ([facility])    [address]  [address]  [place] [address]   [phone]            May 22, 2019  1:30 PM CDT   Diabetes Education with Trisha Timmons RD   [facility]  Diabetes Education ([facility])    [address]  [address]  [place] [address] [phone]              Who to contact     If you have questions or need follow up information about today's clinic visit or your schedule please contact [facility] directly at [phone].  Normal or non-critical lab and imaging results will be communicated to you by MyChart, letter or phone within 4 business days after the clinic has received the results. If you do not hear from us within 7 days, please contact the clinic through MyChart or phone. If you have a critical or abnormal lab result, we will notify you by phone as soon as possible.  Submit refill requests through [employer] or call your pharmacy and they will forward the refill request to us. Please allow 3 business days for your refill to be completed.           Additional Information About Your Visit        [employer] One[employer] On Me (NOOM)hart Information     [employer] gives you secure access to your electronic health record. If you see a primary care provider, you can also send messages to your care team and make appointments. If you have questions, please call your primary care clinic.  If you do not have a primary care provider, please call [phone] and they will assist you.        Care EveryWhere ID     This is your Care EveryWhere ID. This could be used by other organizations to access your [facility] medical records  [id number]        Your Vitals Were     Pulse Last Period Pulse Oximetry             72 06/01/1988 (Approximate) 100%          Blood Pressure from Last 3 Encounters:   09/10/18 146/68   08/15/18 148/75   08/13/18 159/82    Weight from Last 3 Encounters:   08/27/18 79.4 kg (175 lb)   08/15/18 79.4 kg (175 lb)   08/13/18 78.5 kg (173 lb)              Today, you had the following     No orders found for display         Today's Medication Changes          These changes are accurate as of 9/10/18  1:39 PM.  If you have any questions, ask your nurse or doctor.               These medicines have changed or have updated prescriptions.        Dose/Directions    ciprofloxacin-dexamethasone otic suspension   Commonly known as:  CIPRODEX   This may have changed:  when to take this   Used for:  Other infective chronic otitis externa of left ear, Tympanic membrane perforation, left, Sensorineural hearing loss (SNHL) of both ears        Dose:  4 drop   Place 4 drops Into the left ear three times a week   Quantity:  5.6 mL   Refills:  6            Where to get your medicines      These medications were sent to [id number] - [place], [place] - [address]  [address] [address] [place] [address]    Hours:  test fax successful 4/5/04  Phone:  [phone]     ciprofloxacin-dexamethasone otic suspension                Primary Care Provider Office Phone # Fax #    Jennifer Christensen  MD [place] [phone]       [address]  [facility] [address]        Goals        Diet    Have 3 meals a day     Notes - Note created  8/22/2018  1:18 PM by Trisha Timmons, AJ    Goal Statement: Have three meals per day, use the plate method to include 30-60 grams of carbohydrate at meals and 15-30 grams of carbohydrate at snacks. Include a lean source of protein, heart healthy fat and vegetables at meals.  Measure of Success: using above method majority of the time  Supportive Steps to Achieve: Plan ahead, meal plan, support from friend whom she lives with, read labels  Barriers: none  Strengths: motivated   Patient expressed understanding of goal: yes, verbalized             General    Being Active (pt-stated)     Notes - Note created  8/22/2018  1:21 PM by Trisha Timmons RD    Continue working with a  twice per week and walk on other days for a goal of 30 minutes.      Healthy Eating (pt-stated)     Notes - Note created  8/22/2018  1:20 PM by Trisha Timmons RD    Goal Statement: choose high fiber carbohydrate most of the time  Supportive Steps to Achieve: read labels and meal plan  Strengths: motivated and demonstrates understanding              Equal Access to Services     GENE ONWAK AH: Hadii brayan Ma, waaxda luqadaha, qaybta kaalmada adenargisyada, etta hardy. So [facility] [phone].    ATENCIÓN: Si habla español, tiene a jason disposición servicios gratobinnaos de asistencia lingüística. [facility] [phone].    We comply with applicable federal civil rights laws and [place] laws. We do not discriminate on the basis of race, color, national origin, age, disability, sex, sexual orientation, or gender identity.            Thank you!     Thank you for choosing [facility]  for your care. Our goal is always to provide you with excellent care. Hearing back from our patients is one way we can continue to improve our services. Please  take a few minutes to complete the written survey that you may receive in the mail after your visit with us. Thank you!             Your Updated Medication List - Protect others around you: Learn how to safely use, store and throw away your medicines at www.disposemymeds.org.          This list is accurate as of 9/10/18  1:39 PM.  Always use your most recent med list.                   Brand Name Dispense Instructions for use Diagnosis    acetaminophen 500 MG Caps      Take 1,000 mg by mouth nightly as needed Take 500-1,000 mg by mouth every 6 hours if needed.        ARTIFICIAL TEARS OP      Apply 1 drop to eye as needed        BENEFIBER Powd      2 teaspoons daily        blood glucose monitoring meter device kit    no brand specified    1 kit    Use to test blood sugar 2times daily or as directed.    Type 2 diabetes mellitus with stage 3 chronic kidney disease, without long-term current use of insulin (H)       blood glucose monitoring test strip    no brand specified    200 each    Use to test blood sugar 2 times daily, before breakfast and before bedtime    Type 2 diabetes mellitus with stage 3 chronic kidney disease, without long-term current use of insulin (H)       calcitRIOL 0.5 MCG capsule    ROCALTROL    90 capsule    Take 1 capsule (0.5 mcg) by mouth daily    Postablative hypoparathyroidism (H)       ciprofloxacin-dexamethasone otic suspension    CIPRODEX    5.6 mL    Place 4 drops Into the left ear three times a week    Other infective chronic otitis externa of left ear, Tympanic membrane perforation, left, Sensorineural hearing loss (SNHL) of both ears       * clotrimazole 1 % cream    LOTRIMIN     Apply topically 2 times daily as needed Reported on 4/25/2017        * clotrimazole 1 % solution    LOTRIMIN    28 mL    Apply 1 mL topically 2 times daily for 14 days Correct instruction - 4 drops LEFT ear twice daily for 14 days    Other infective chronic otitis externa of left ear, S/P tympanoplasty        ELIQUIS 2.5 MG tablet   Generic drug:  apixaban ANTICOAGULANT      Take 2.5 mg by mouth 2 times daily        estradiol 0.1 MG/GM cream    ESTRACE VAGINAL    42.5 g    Place 2 g vaginally twice a week    Atrophic vaginitis       ezetimibe 10 MG tablet    ZETIA    90 tablet    TAKE 1 TABLET BY MOUTH EVERY DAY OR AS DIRECTED BY DR WOLF    Hyperlipidemia LDL goal <70, Benign essential hypertension       ferrous gluconate 324 (38 Fe) MG tablet    FERGON    90 tablet    Take 1 tablet (324 mg) by mouth 3 times daily (with meals)    Liver replaced by transplant (H)       folic acid 1 MG tablet    FOLVITE    100 tablet    Take 1 tablet (1 mg) by mouth daily    Liver replaced by transplant (H)       furosemide 20 MG tablet    LASIX    45 tablet    TAKE 1/2 TABLET BY MOUTH EVERY DAY    CKD (chronic kidney disease) stage 3, GFR 30-59 ml/min, Liver replaced by transplant (H)       hydrALAZINE 25 MG tablet    APRESOLINE    270 tablet    Take 0.5 tablets (12.5 mg) by mouth 3 times daily as needed , if systolic blood pressure is more than 140 mmHg.    HTN (hypertension)       ketoconazole 2 % cream    NIZORAL    15 g    Apply topically 2 times daily To angles of mouth    Angular cheilitis       LOVAZA 1 g capsule   Generic drug:  omega-3 acid ethyl esters     360 capsule    Take 1 capsule (1 g) by mouth 2 times daily    Hypertriglyceridemia       meclizine 25 MG tablet    ANTIVERT    30 tablet    Take 1 tablet (25 mg) by mouth as needed    Dizziness       metoprolol succinate 50 MG 24 hr tablet    TOPROL-XL    90 tablet    TAKE 1 TABLET (50 MG) BY MOUTH DAILY    Paroxysmal atrial fibrillation (H)       neomycin-polymyxin-hydrocortisone 3.5-29915-3 otic suspension    CORTISPORIN    10 mL    Place 4 drops Into the left ear 4 times daily    Acute swimmer's ear of left side       nitroFURantoin (macrocrystal-monohydrate) 100 MG capsule    MACROBID    14 capsule    Take 1 capsule (100 mg) by mouth 2 times daily For one week at onset  of UTI symptoms    Urinary tract infection without hematuria, site unspecified       predniSONE 5 MG tablet    DELTASONE    90 tablet    Take 1 tablet (5 mg) by mouth daily    Thyroid cancer (H), Liver replaced by transplant (H)       PRESERVISION AREDS 2 Caps      Take 1 capsule by mouth 2 times daily        sertraline 50 MG tablet    ZOLOFT    90 tablet    Take 1 tablet (50 mg) by mouth daily    Adjustment disorder with depressed mood       sirolimus 1 MG Tabs tablet     45 tablet    Take 1 tablet (1 mg) by mouth every other day    Liver replaced by transplant (H)       SUMAtriptan 50 MG tablet    IMITREX    30 tablet    Take 1 tablet (50 mg) by mouth at onset of headache for migraine repeat after 2 hours if needed.    Migraine without aura and without status migrainosus, not intractable       * SYNTHROID 150 MCG tablet   Generic drug:  levothyroxine      Take 225 mcg by mouth on Mondays        * levothyroxine 150 MCG tablet    SYNTHROID/LEVOTHROID    102 tablet    TAKE 1.5 TABLETS BY MOUTH ON MONDAYS. AND 1 TABLET DAILY, THE OTHER DAYS OF THE WEEK.    Malignant neoplasm of thyroid gland (H)       triamcinolone 0.1 % cream    KENALOG     Apply topically 2 times daily as needed for irritation        ursodiol 250 MG tablet    ACTIGALL    180 tablet    Take 1 tablet (250 mg) by mouth 2 times daily    Liver replaced by transplant (H)       voriconazole 200 MG tablet    VFEND    60 tablet    Take 1 tablet (200 mg) by mouth 2 times daily    Coccidioidal pneumonitis (H)       * Notice:  This list has 4 medication(s) that are the same as other medications prescribed for you. Read the directions carefully, and ask your doctor or other care provider to review them with you.

## 2018-09-10 NOTE — PROGRESS NOTES
History of Present Illness - Luz Thompson is a 68 year old female here in close follow up from 7/30/18, to check on the resolution of a LEFT otitis externa and otitis media, with a perforation.    To review, she has had LEFT ear surgery with a tympanoplasty in 1996.  Otherwise no chronic ear disease.  She has also had thyroid surgery and ablation in March 2010.  The main reason she is here is progressive bilateral ear fullness and blockage of her hearing aids with cerumen.    At the March 2014 visit, she had been through quite an ordeal, having been hospitalized for E. Coli sepsis, the source was unknown. No new issues with that problem since then. And after asking her about it, it happened again this past June of 2015. Her past year had been fine, and other than fullness from her cerumen build up, no new ENT issues. No drainage from the ears, no bleeding, no vertigo. She still uses bilateral hearing aids.    About one week prior to the 6/29/18 visit, she noted some drainage from the LEFT ear.  She went to urgent care and was placed on some antibiotics.  Of note, she also got some pink eye.  However, she was not given drops because of a concern for drug interaction.  However, she was then placed on oral antibiotics.  On exam, the LEFT ear was completely filled with purulence, and cellulitic changes were starting in the external ear soft tissues and skin.  I treated her with debridement, ciprodex, and continued to the oral antibiotics.  Also, on exam after debridement I found a 10% perforation of the LEFT tympanic membrane.  At the follow up on 7/16, the infeciton was 90% cleared, but there was still some purulence on the LEFT tympanic membrane and the perforation was unchanged.  Therefore I had her continue cirpodex and she is here for follow up.    We are hoping this resolves, as she leaves for [place] for the winter, in late September.    At the 7/30/18 visit, we had turned a corner, and the infection was totally  cleared. The perforation was smaller as well, leaving only about a 5 % hole.  We were optimistic, and she is here for follow up prior to leaving for [place].    Of note, just at the end of July she was first diagnosed with Type 2 DM.  She thinks her ear has takena  Turn for the worse.  At the follow up on 8/28/18, unfortunately the LEFT ear had a full blown infection again and I debrided it.  The perforation was stable however.    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     Prophylactic antibiotic     High risk medication use     Statin intolerance     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     Coccidioidal pneumonitis (H)     SNHL (sensorineural hearing loss)     Abnormal liver function tests     Diagnostic skin and sensitization tests     Perforation bowel (H)     Liver replaced by transplant (H)     Adjustment disorder with depressed mood     Type 2 diabetes, HbA1c goal < 7% (H)     Hyperlipidemia LDL goal <70     Hypertension goal BP (blood pressure) < 140/80     Postoperative hypothyroidism     Type 2 diabetes mellitus with stage 3 chronic kidney disease, without long-term current use of insulin (H)     Age-related osteoporosis without current pathological fracture     Perforation of tympanic membrane, left       Current Medications -   Current Outpatient Prescriptions:      acetaminophen 500 MG CAPS, Take 1,000 mg by mouth nightly as needed Take 500-1,000 mg by mouth every 6 hours if needed. , Disp: , Rfl:      blood glucose monitoring (NO BRAND SPECIFIED) meter device kit, Use to test blood sugar 2times daily or as directed., Disp: 1 kit, Rfl: 0     blood glucose monitoring (NO BRAND SPECIFIED) test strip, Use to test blood sugar 2 times daily, before breakfast and before bedtime, Disp: 200 each, Rfl: 3      calcitRIOL (ROCALTROL) 0.5 MCG capsule, Take 1 capsule (0.5 mcg) by mouth daily, Disp: 90 capsule, Rfl: 3     ciprofloxacin-dexamethasone (CIPRODEX) otic suspension, Place 4 drops Into the left ear 2 times daily, Disp: 5.6 mL, Rfl: 0     clotrimazole (LOTRIMIN) 1 % cream, Apply topically 2 times daily as needed Reported on 4/25/2017, Disp: , Rfl:      clotrimazole (LOTRIMIN) 1 % solution, Apply 1 mL topically 2 times daily for 14 days Correct instruction - 4 drops LEFT ear twice daily for 14 days, Disp: 28 mL, Rfl: 1     ELIQUIS 2.5 MG tablet, Take 2.5 mg by mouth 2 times daily , Disp: , Rfl:      estradiol (ESTRACE VAGINAL) 0.1 MG/GM cream, Place 2 g vaginally twice a week, Disp: 42.5 g, Rfl: 11     ezetimibe (ZETIA) 10 MG tablet, TAKE 1 TABLET BY MOUTH EVERY DAY OR AS DIRECTED BY DR WOLF, Disp: 90 tablet, Rfl: 3     ferrous gluconate (FERGON) 324 (38 Fe) MG tablet, Take 1 tablet (324 mg) by mouth 3 times daily (with meals), Disp: 90 tablet, Rfl: 0     folic acid (FOLVITE) 1 MG tablet, Take 1 tablet (1 mg) by mouth daily, Disp: 100 tablet, Rfl: 3     furosemide (LASIX) 20 MG tablet, TAKE 1/2 TABLET BY MOUTH EVERY DAY, Disp: 45 tablet, Rfl: 3     hydrALAZINE (APRESOLINE) 25 MG tablet, Take 0.5 tablets (12.5 mg) by mouth 3 times daily as needed , if systolic blood pressure is more than 140 mmHg., Disp: 270 tablet, Rfl: 3     Hypromellose (ARTIFICIAL TEARS OP), Apply 1 drop to eye as needed, Disp: , Rfl:      ketoconazole (NIZORAL) 2 % cream, Apply topically 2 times daily To angles of mouth, Disp: 15 g, Rfl: 0     levothyroxine (SYNTHROID) 150 MCG tablet, Take 225 mcg by mouth on Mondays, Disp: , Rfl:      levothyroxine (SYNTHROID/LEVOTHROID) 150 MCG tablet, TAKE 1.5 TABLETS BY MOUTH ON MONDAYS. AND 1 TABLET DAILY, THE OTHER DAYS OF THE WEEK., Disp: 102 tablet, Rfl: 3     meclizine (ANTIVERT) 25 MG tablet, Take 1 tablet (25 mg) by mouth as needed, Disp: 30 tablet, Rfl: 11     metoprolol succinate (TOPROL-XL) 50 MG  24 hr tablet, TAKE 1 TABLET (50 MG) BY MOUTH DAILY, Disp: 90 tablet, Rfl: 3     Multiple Vitamins-Minerals (PRESERVISION AREDS 2) CAPS, Take 1 capsule by mouth 2 times daily, Disp: , Rfl:      neomycin-polymyxin-hydrocortisone (CORTISPORIN) 3.5-64460-5 otic suspension, Place 4 drops Into the left ear 4 times daily, Disp: 10 mL, Rfl: 0     nitroFURantoin, macrocrystal-monohydrate, (MACROBID) 100 MG capsule, Take 1 capsule (100 mg) by mouth 2 times daily For one week at onset of UTI symptoms, Disp: 14 capsule, Rfl: 6     omega-3 acid ethyl esters (LOVAZA) 1 g capsule, Take 1 capsule (1 g) by mouth 2 times daily, Disp: 360 capsule, Rfl: 3     predniSONE (DELTASONE) 5 MG tablet, Take 1 tablet (5 mg) by mouth daily, Disp: 90 tablet, Rfl: 3     RAPAMUNE (BRAND) 1 MG PO TABLET, Take 1 tablet (1 mg) by mouth every other day, Disp: 45 tablet, Rfl: 3     sertraline (ZOLOFT) 50 MG tablet, Take 1 tablet (50 mg) by mouth daily, Disp: 90 tablet, Rfl: 3     SUMAtriptan (IMITREX) 50 MG tablet, Take 1 tablet (50 mg) by mouth at onset of headache for migraine repeat after 2 hours if needed., Disp: 30 tablet, Rfl: 3     triamcinolone (KENALOG) 0.1 % cream, Apply topically 2 times daily as needed for irritation, Disp: , Rfl:      ursodiol (ACTIGALL) 250 MG tablet, Take 1 tablet (250 mg) by mouth 2 times daily, Disp: 180 tablet, Rfl: 3     voriconazole (VFEND) 200 MG tablet, Take 1 tablet (200 mg) by mouth 2 times daily, Disp: 60 tablet, Rfl: 2     Wheat Dextrin (BENEFIBER) POWD, 2 teaspoons daily, Disp: , Rfl:     Allergies -   Allergies   Allergen Reactions     Fluconazole Hives and Itching     Ciprofloxacin Anxiety and Other (See Comments)     Irregular heart beat     Azithromycin Itching     Benadryl [Diphenhydramine Hcl]      Insomnia      Cellcept Diarrhea     Ciprofloxacin Other (See Comments)     Insomnia, mood lability     Codeine      Psych disturbance     Codeine      Diphenhydramine Other (See Comments)     Doxycycline       "Lansoprazole Diarrhea     Levaquin [Levofloxacin] Other (See Comments)     Headache, hyperactivity     Lisinopril Cough     Methotrexate      Sores     Methotrexate      Morphine Sulfate Itching     Mycophenolate Diarrhea     No Clinical Screening - See Comments      Simvastatin Muscle Pain (Myalgia)     severe     Cephalexin Rash     Fever and skin burning     Penicillin G Itching and Rash     Tolectin [Nsaids] Rash     Tramadol Rash       Social History -   Social History     Social History     Marital status: Legally      Spouse name: Robbin Thompson     Number of children: 4     Years of education: 20     Occupational History     [employer]  [employer]     social work      Self     Social History Main Topics     Smoking status: Former Smoker     Packs/day: 1.00     Years: 18.00     Types: Cigarettes     Quit date: 1985     Smokeless tobacco: Never Used     Alcohol use 0.0 oz/week     0 Standard drinks or equivalent per week      Comment: rare - \"I toast at weddings\"     Drug use: No     Sexual activity: Yes     Partners: Male     Birth control/ protection: Post-menopausal     Other Topics Concern     Exercise Yes     cardio and strengthing     Social History Narrative    She is retired. She and her  travel around the [place] in an RV. [place] in [place] the course of the winter. 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       Family History -   Family History   Problem Relation Age of Onset     Hypertension Mother      Endometrial Cancer Mother      Hyperlipidemia Mother      Prostate Cancer Father      Macular Degeneration Father      Cancer - colorectal Maternal Grandmother      in her 80's, has surgery and removal of part of kidney,  at age 98     HEART DISEASE Maternal Grandfather       at 98     Glaucoma Maternal Grandfather      Cerebrovascular Disease " Paternal Grandmother      in her 80's     Hypertension Paternal Grandmother      HEART DISEASE Paternal Grandfather      MI     Alzheimer Disease Paternal Grandfather      Allergies Son      Neurologic Disorder Daughter      Migraines     Breast Cancer Other      Anesthesia Reaction No family hx of      Crohn Disease No family hx of      Ulcerative Colitis No family hx of        Review of Systems - As per HPI and PMHx, otherwise 10+ system review of the head and neck, and general constitution is negative.    Physical Exam  LMP 06/01/1988 (Approximate)    General - The patient is well nourished and well developed, and appears to have good nutritional status.  Alert and oriented to person and place, answers questions and cooperates with examination appropriately.   Head and Face - Normocephalic and atraumatic, with no gross asymmetry noted of the contour of the facial features.  The facial nerve is intact, with strong symmetric movements.  Voice and Breathing - The patient was breathing comfortably without the use of accessory muscles. There was no wheezing, stridor, or stertor.  The patients voice was clear and strong, and had appropriate pitch and quality.  Ears - The RIGHT ear and RIGHT tympanic membrane are healthy and normal.  The LEFT ear canal is much better, no moisture or purulence at all.  The tympanic membrane is still slightly edematous, and the perforation is stable at 10-15% in the posterior inferior quadrant.  Eyes - Extraocular movements intact, and the pupils were reactive to light.  Sclera were not icteric or injected, conjunctiva were pink and moist.        A/P - Luz Thompson is a 68 year old female  (H90.3) Sensorineural hearing loss (SNHL) of both ears  (primary encounter diagnosis)  (Z98.890) S/P tympanoplasty  (H72.92) Perforation of tympanic membrane, left    The LEFT ear looks great again, but the tympanic membrane is still edematous and the perforation has not changed.    Consider  having the LEFT hearing aid mold changed or professionally cleaned.    I recommend ciprodex three times per week in the LEFT ear and strict water precautions.  She will see me one more time in October before she leaves for [place].

## 2018-09-10 NOTE — LETTER
9/10/2018         RE: Luz Thompson  [address]  [place] [address]        Dear Colleague,    Thank you for referring your patient, Luz Thompson, to the [facility]. Please see a copy of my visit note below.    History of Present Illness - Luz Thompson is a 68 year old female here in close follow up from 7/30/18, to check on the resolution of a LEFT otitis externa and otitis media, with a perforation.    To review, she has had LEFT ear surgery with a tympanoplasty in 1996.  Otherwise no chronic ear disease.  She has also had thyroid surgery and ablation in March 2010.  The main reason she is here is progressive bilateral ear fullness and blockage of her hearing aids with cerumen.    At the March 2014 visit, she had been through quite an ordeal, having been hospitalized for E. Coli sepsis, the source was unknown. No new issues with that problem since then. And after asking her about it, it happened again this past June of 2015. Her past year had been fine, and other than fullness from her cerumen build up, no new ENT issues. No drainage from the ears, no bleeding, no vertigo. She still uses bilateral hearing aids.    About one week prior to the 6/29/18 visit, she noted some drainage from the LEFT ear.  She went to urgent care and was placed on some antibiotics.  Of note, she also got some pink eye.  However, she was not given drops because of a concern for drug interaction.  However, she was then placed on oral antibiotics.  On exam, the LEFT ear was completely filled with purulence, and cellulitic changes were starting in the external ear soft tissues and skin.  I treated her with debridement, ciprodex, and continued to the oral antibiotics.  Also, on exam after debridement I found a 10% perforation of the LEFT tympanic membrane.  At the follow up on 7/16, the infeciton was 90% cleared, but there was still some purulence on the LEFT tympanic membrane and the  perforation was unchanged.  Therefore I had her continue cirpodex and she is here for follow up.    We are hoping this resolves, as she leaves for [place] for the winter, in late September.    At the 7/30/18 visit, we had turned a corner, and the infection was totally cleared. The perforation was smaller as well, leaving only about a 5 % hole.  We were optimistic, and she is here for follow up prior to leaving for [place].    Of note, just at the end of July she was first diagnosed with Type 2 DM.  She thinks her ear has takena  Turn for the worse.  At the follow up on 8/28/18, unfortunately the LEFT ear had a full blown infection again and I debrided it.  The perforation was stable however.    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     Prophylactic antibiotic     High risk medication use     Statin intolerance     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     Coccidioidal pneumonitis (H)     SNHL (sensorineural hearing loss)     Abnormal liver function tests     Diagnostic skin and sensitization tests     Perforation bowel (H)     Liver replaced by transplant (H)     Adjustment disorder with depressed mood     Type 2 diabetes, HbA1c goal < 7% (H)     Hyperlipidemia LDL goal <70     Hypertension goal BP (blood pressure) < 140/80     Postoperative hypothyroidism     Type 2 diabetes mellitus with stage 3 chronic kidney disease, without long-term current use of insulin (H)     Age-related osteoporosis without current pathological fracture     Perforation of tympanic membrane, left       Current Medications -   Current Outpatient Prescriptions:      acetaminophen 500 MG CAPS, Take 1,000 mg by mouth nightly as needed Take 500-1,000 mg by mouth every 6 hours if needed. , Disp: , Rfl:      blood glucose monitoring (NO BRAND  SPECIFIED) meter device kit, Use to test blood sugar 2times daily or as directed., Disp: 1 kit, Rfl: 0     blood glucose monitoring (NO BRAND SPECIFIED) test strip, Use to test blood sugar 2 times daily, before breakfast and before bedtime, Disp: 200 each, Rfl: 3     calcitRIOL (ROCALTROL) 0.5 MCG capsule, Take 1 capsule (0.5 mcg) by mouth daily, Disp: 90 capsule, Rfl: 3     ciprofloxacin-dexamethasone (CIPRODEX) otic suspension, Place 4 drops Into the left ear 2 times daily, Disp: 5.6 mL, Rfl: 0     clotrimazole (LOTRIMIN) 1 % cream, Apply topically 2 times daily as needed Reported on 4/25/2017, Disp: , Rfl:      clotrimazole (LOTRIMIN) 1 % solution, Apply 1 mL topically 2 times daily for 14 days Correct instruction - 4 drops LEFT ear twice daily for 14 days, Disp: 28 mL, Rfl: 1     ELIQUIS 2.5 MG tablet, Take 2.5 mg by mouth 2 times daily , Disp: , Rfl:      estradiol (ESTRACE VAGINAL) 0.1 MG/GM cream, Place 2 g vaginally twice a week, Disp: 42.5 g, Rfl: 11     ezetimibe (ZETIA) 10 MG tablet, TAKE 1 TABLET BY MOUTH EVERY DAY OR AS DIRECTED BY DR WOLF, Disp: 90 tablet, Rfl: 3     ferrous gluconate (FERGON) 324 (38 Fe) MG tablet, Take 1 tablet (324 mg) by mouth 3 times daily (with meals), Disp: 90 tablet, Rfl: 0     folic acid (FOLVITE) 1 MG tablet, Take 1 tablet (1 mg) by mouth daily, Disp: 100 tablet, Rfl: 3     furosemide (LASIX) 20 MG tablet, TAKE 1/2 TABLET BY MOUTH EVERY DAY, Disp: 45 tablet, Rfl: 3     hydrALAZINE (APRESOLINE) 25 MG tablet, Take 0.5 tablets (12.5 mg) by mouth 3 times daily as needed , if systolic blood pressure is more than 140 mmHg., Disp: 270 tablet, Rfl: 3     Hypromellose (ARTIFICIAL TEARS OP), Apply 1 drop to eye as needed, Disp: , Rfl:      ketoconazole (NIZORAL) 2 % cream, Apply topically 2 times daily To angles of mouth, Disp: 15 g, Rfl: 0     levothyroxine (SYNTHROID) 150 MCG tablet, Take 225 mcg by mouth on Mondays, Disp: , Rfl:      levothyroxine (SYNTHROID/LEVOTHROID) 150 MCG  tablet, TAKE 1.5 TABLETS BY MOUTH ON MONDAYS. AND 1 TABLET DAILY, THE OTHER DAYS OF THE WEEK., Disp: 102 tablet, Rfl: 3     meclizine (ANTIVERT) 25 MG tablet, Take 1 tablet (25 mg) by mouth as needed, Disp: 30 tablet, Rfl: 11     metoprolol succinate (TOPROL-XL) 50 MG 24 hr tablet, TAKE 1 TABLET (50 MG) BY MOUTH DAILY, Disp: 90 tablet, Rfl: 3     Multiple Vitamins-Minerals (PRESERVISION AREDS 2) CAPS, Take 1 capsule by mouth 2 times daily, Disp: , Rfl:      neomycin-polymyxin-hydrocortisone (CORTISPORIN) 3.5-35296-4 otic suspension, Place 4 drops Into the left ear 4 times daily, Disp: 10 mL, Rfl: 0     nitroFURantoin, macrocrystal-monohydrate, (MACROBID) 100 MG capsule, Take 1 capsule (100 mg) by mouth 2 times daily For one week at onset of UTI symptoms, Disp: 14 capsule, Rfl: 6     omega-3 acid ethyl esters (LOVAZA) 1 g capsule, Take 1 capsule (1 g) by mouth 2 times daily, Disp: 360 capsule, Rfl: 3     predniSONE (DELTASONE) 5 MG tablet, Take 1 tablet (5 mg) by mouth daily, Disp: 90 tablet, Rfl: 3     RAPAMUNE (BRAND) 1 MG PO TABLET, Take 1 tablet (1 mg) by mouth every other day, Disp: 45 tablet, Rfl: 3     sertraline (ZOLOFT) 50 MG tablet, Take 1 tablet (50 mg) by mouth daily, Disp: 90 tablet, Rfl: 3     SUMAtriptan (IMITREX) 50 MG tablet, Take 1 tablet (50 mg) by mouth at onset of headache for migraine repeat after 2 hours if needed., Disp: 30 tablet, Rfl: 3     triamcinolone (KENALOG) 0.1 % cream, Apply topically 2 times daily as needed for irritation, Disp: , Rfl:      ursodiol (ACTIGALL) 250 MG tablet, Take 1 tablet (250 mg) by mouth 2 times daily, Disp: 180 tablet, Rfl: 3     voriconazole (VFEND) 200 MG tablet, Take 1 tablet (200 mg) by mouth 2 times daily, Disp: 60 tablet, Rfl: 2     Wheat Dextrin (BENEFIBER) POWD, 2 teaspoons daily, Disp: , Rfl:     Allergies -   Allergies   Allergen Reactions     Fluconazole Hives and Itching     Ciprofloxacin Anxiety and Other (See Comments)     Irregular heart beat      "Azithromycin Itching     Benadryl [Diphenhydramine Hcl]      Insomnia      Cellcept Diarrhea     Ciprofloxacin Other (See Comments)     Insomnia, mood lability     Codeine      Psych disturbance     Codeine      Diphenhydramine Other (See Comments)     Doxycycline      Lansoprazole Diarrhea     Levaquin [Levofloxacin] Other (See Comments)     Headache, hyperactivity     Lisinopril Cough     Methotrexate      Sores     Methotrexate      Morphine Sulfate Itching     Mycophenolate Diarrhea     No Clinical Screening - See Comments      Simvastatin Muscle Pain (Myalgia)     severe     Cephalexin Rash     Fever and skin burning     Penicillin G Itching and Rash     Tolectin [Nsaids] Rash     Tramadol Rash       Social History -   Social History     Social History     Marital status: Legally      Spouse name: Robbin Thompson     Number of children: 4     Years of education: 20     Occupational History     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 She and her  travel around the [place] in an [place]. [place] course of the [place]. 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       Family History -   Family History   Problem Relation Age of Onset     Hypertension Mother      Endometrial Cancer Mother      Hyperlipidemia Mother      Prostate Cancer Father      " Macular Degeneration Father      Cancer - colorectal Maternal Grandmother      in her 80's, has surgery and removal of part of kidney,  at age 98     HEART DISEASE Maternal Grandfather       at 98     Glaucoma Maternal Grandfather      Cerebrovascular Disease Paternal Grandmother      in her 80's     Hypertension Paternal Grandmother      HEART DISEASE Paternal Grandfather      MI     Alzheimer Disease Paternal Grandfather      Allergies Son      Neurologic Disorder Daughter      Migraines     Breast Cancer Other      Anesthesia Reaction No family hx of      Crohn Disease No family hx of      Ulcerative Colitis No family hx of        Review of Systems - As per HPI and PMHx, otherwise 10+ system review of the head and neck, and general constitution is negative.    Physical Exam  LMP 1988 (Approximate)    General - The patient is well nourished and well developed, and appears to have good nutritional status.  Alert and oriented to person and place, answers questions and cooperates with examination appropriately.   Head and Face - Normocephalic and atraumatic, with no gross asymmetry noted of the contour of the facial features.  The facial nerve is intact, with strong symmetric movements.  Voice and Breathing - The patient was breathing comfortably without the use of accessory muscles. There was no wheezing, stridor, or stertor.  The patients voice was clear and strong, and had appropriate pitch and quality.  Ears - The RIGHT ear and RIGHT tympanic membrane are healthy and normal.  The LEFT ear canal is much better, no moisture or purulence at all.  The tympanic membrane is still slightly edematous, and the perforation is stable at 10-15% in the posterior inferior quadrant.  Eyes - Extraocular movements intact, and the pupils were reactive to light.  Sclera were not icteric or injected, conjunctiva were pink and moist.        A/P - Luz Thompson is a 68 year old female  (H90.3) Sensorineural hearing  loss (SNHL) of both ears  (primary encounter diagnosis)  (Z98.890) S/P tympanoplasty  (H72.92) Perforation of tympanic membrane, left    The LEFT ear looks great again, but the tympanic membrane is still edematous and the perforation has not changed.    Consider having the LEFT hearing aid mold changed or professionally cleaned.    I recommend ciprodex three times per week in the LEFT ear and strict water precautions.  She will see me one more time in October before she leaves for [place].      Again, thank you for allowing me to participate in the care of your patient.        Sincerely,        Randell Mejia MD

## 2018-09-12 ENCOUNTER — OFFICE VISIT (OUTPATIENT)
Dept: SURGERY | Facility: CLINIC | Age: 68
End: 2018-09-12
Attending: CLINICAL NURSE SPECIALIST
Payer: MEDICARE

## 2018-09-12 ENCOUNTER — RADIANT APPOINTMENT (OUTPATIENT)
Dept: CT IMAGING | Facility: CLINIC | Age: 68
End: 2018-09-12
Attending: NURSE PRACTITIONER
Payer: MEDICARE

## 2018-09-12 VITALS
OXYGEN SATURATION: 96 % | TEMPERATURE: 98.8 F | HEIGHT: 68 IN | RESPIRATION RATE: 16 BRPM | HEART RATE: 69 BPM | SYSTOLIC BLOOD PRESSURE: 126 MMHG | WEIGHT: 173.31 LBS | DIASTOLIC BLOOD PRESSURE: 65 MMHG | BODY MASS INDEX: 26.27 KG/M2

## 2018-09-12 DIAGNOSIS — R91.1 LUNG NODULE: Primary | ICD-10-CM

## 2018-09-12 DIAGNOSIS — R91.1 LUNG NODULE: ICD-10-CM

## 2018-09-12 PROCEDURE — G0463 HOSPITAL OUTPT CLINIC VISIT: HCPCS | Mod: ZF

## 2018-09-12 ASSESSMENT — PAIN SCALES - GENERAL: PAINLEVEL: NO PAIN (0)

## 2018-09-12 NOTE — PROGRESS NOTES
THORACIC SURGERY FOLLOW UP VISIT    Dear Dr. Barraza,  I saw Ms. Luz Thompson in follow-up today. The clinical summary follows:     PREOP DIAGNOSIS   Lung Nodule  INTERVAL STUDIES  Chest CT-final report pending, appears stable on my review. Will await final radiology report.    ETOH rare  TOB former smoker (quit 1985)  BMI 26.7    SUBJECTIVE  [place] is doing well. No fevers, night sweats, chills, weight loss, cough or SOB.    From a personal perspective, [place] and her friend will be leaving for [place] mid-October and will be staying there until mid-May.    IMPRESSION No diagnosis found.  68 year-old female with a lung nodule.    PLAN  I spent a total of 15 minutes with Ms. Luz Thompson, more than 50% of which were spent in counseling, coordination of care, and face-to-face time. I reviewed the plan as follows:  Await final reading from her chest CT done earlier.  Follow up in mid-May with chest CT when she returns from [place].  Necessary Tests & Appointments: Chest CT and clinic mid-May  Pain Control Plan: N/A  Anticoagulation Plan: N/A  Smoking Cessation: N/A  All questions were answered and the patient and present family were in agreement with the plan.  I appreciate the opportunity to participate in the care of your patient and will keep you updated.  Sincerely,

## 2018-09-12 NOTE — LETTER
9/12/2018       RE: Luz Thompson  [address]  [place] [address]     Dear Colleague,    Thank you for referring your patient, Luz Thompson, to the [facility]. Please see a copy of my visit note below.    THORACIC SURGERY FOLLOW UP VISIT    Dear Dr. Barraza,  I saw Ms. Luz Thompson in follow-up today. The clinical summary follows:     PREOP DIAGNOSIS   Lung Nodule  INTERVAL STUDIES  Chest CT-final report pending, appears stable on my review. Will await final radiology report.    ETOH rare  TOB former smoker (quit 1985)  BMI 26.7    SUBJECTIVE  [place] is doing well. No fevers, night sweats, chills, weight loss, cough or SOB.    From a personal perspective, Virginia and her friend will be leaving for [place] mid-October and will be staying there until mid-May.    IMPRESSION No diagnosis found.  68 year-old female with a lung nodule.    PLAN  I spent a total of 15 minutes with Ms. Luz Thompson, more than 50% of which were spent in counseling, coordination of care, and face-to-face time. I reviewed the plan as follows:  Await final reading from her chest CT done earlier.  Follow up in mid-May with chest CT when she returns from [place].  Necessary Tests & Appointments: Chest CT and clinic mid-May  Pain Control Plan: N/A  Anticoagulation Plan: N/A  Smoking Cessation: N/A  All questions were answered and the patient and present family were in agreement with the plan.  I appreciate the opportunity to participate in the care of your patient and will keep you updated.  Sincerely,      LIZ Bush CNS

## 2018-09-12 NOTE — NURSING NOTE
"Oncology Rooming Note    September 12, 2018 1:01 PM   Luz Thompson is a 68 year old female who presents for:    Chief Complaint   Patient presents with     Oncology Clinic Visit     Return: Lung Nodule     Initial Vitals: /65  Pulse 69  Temp 98.8  F (37.1  C) (Oral)  Resp 16  Ht 1.715 m (5' 7.5\")  Wt 78.6 kg (173 lb 5 oz)  LMP 06/01/1988 (Approximate)  SpO2 96%  BMI 26.74 kg/m2 Estimated body mass index is 26.74 kg/(m^2) as calculated from the following:    Height as of this encounter: 1.715 m (5' 7.5\").    Weight as of this encounter: 78.6 kg (173 lb 5 oz). Body surface area is 1.93 meters squared.  No Pain (0) Comment: Data Unavailable   Patient's last menstrual period was 06/01/1988 (approximate).  Allergies reviewed: Yes  Medications reviewed: Yes    Medications: Medication refills not needed today.  Pharmacy name entered into [employer] Packet: [id number] - [place], [place] - [address] [facility]    Clinical concerns: no new concerns today, but did have a CT prior to appt today Cece Maynard was notified.    10 minutes for nursing intake (face to face time)     Tigre Dutta CMA              "

## 2018-09-12 NOTE — MR AVS SNAPSHOT
After Visit Summary   9/12/2018    Luz Thompson    MRN: [id number]           Patient Information     Date Of Birth          1950        Visit Information        Provider Department      9/12/2018 12:40 PM Cece Maynard APRN [facility]        Today's Diagnoses     Lung nodule    -  1       Follow-ups after your visit        Your next 10 appointments already scheduled     Oct 08, 2018  1:15 PM CDT   Return Visit with Randell Mejia MD   [facility] ([facility])    [address] [place] [id number]   [phone]            May 14, 2019 12:20 PM CDT   CT CHEST W/O CONTRAST with UCCT2   [facility] [place] [facility] ([facility])    [address] [address] [phone]           How do I prepare for my exam? (Food and drink instructions) No Food and Drink Restrictions.  How do I prepare for my exam? (Other instructions) You do not need to do anything special to prepare for this exam. For a sinus scan: Use your nose spray (nasal decongestant spray) as directed.  What should I wear: Please wear loose clothing, such as a sweat suit or jogging clothes. Avoid snaps, zippers and other metal. We may ask you to undress and put on a hospital gown.  How long does the exam take: Most scans take less than 20 minutes.  What should I bring: Please bring any scans or X-rays taken at other hospitals, if similar tests were done. Also bring a list of your medicines, including vitamins, minerals and over-the-counter drugs. It is safest to leave personal items at home.  Do I need a : No  is needed.  What do I need to tell my doctor? Be sure to tell your doctor: * If you have any allergies. * If there s any chance you are pregnant. * If you are breastfeeding.  What should I do after the exam: No restrictions, You may resume normal activities.  What is this test: A  CT (computed tomography) scan is a series of pictures that allows us to look inside your body. The scanner creates images of the body in cross sections, much like slices of bread. This helps us see any problems more clearly.  Who should I call with questions: If you have any questions, please call the Imaging Department where you will have your exam. Directions, parking instructions, and other information is available on our website, [email or web address].            May 14, 2019 12:40 PM CDT   (Arrive by 12:25 PM)   Return Visit with LIZ Bush   [facility] ([facility])    [address]  [address]  [facility] [id number] [phone]            May 15, 2019 10:30 AM CDT   (Arrive by 10:15 AM)   Return Visit with Crystal Montero MD   [facility] ([facility])    [address]  [address]  [facility] [id number] [phone]            May 22, 2019  1:30 PM CDT   Diabetes Education with Trisha Timmons RD   [facility] [place],  Diabetes Education ([facility])    [address]  [address]  [facility] [id number] [phone]              Future tests that were ordered for you today     Open Future Orders        Priority Expected Expires Ordered    CT Chest w/o Contrast Routine  9/12/2019 9/12/2018            Who to contact     If you have questions or need follow up information about today's clinic visit or your schedule please contact [facility] directly at [phone].  Normal or non-critical lab and imaging results will be communicated to you by MyChart, letter or phone within 4 business days after the clinic has received the results. If you do not hear from us within 7 days, please contact the clinic through MyChart or phone. If you have a critical or abnormal lab result, we will  "notify you by phone as soon as possible.  Submit refill requests through [employer] or call your pharmacy and they will forward the refill request to us. Please allow 3 business days for your refill to be completed.          Additional Information About Your Visit        [employer] Information     [employer] gives you secure access to your electronic health record. If you see a primary care provider, you can also send messages to your care team and make appointments. If you have questions, please call your primary care clinic.  If you do not have a primary care provider, please call [phone] and they will assist you.        Care EveryWhere ID     This is your Care EveryWhere ID. This could be used by other organizations to access your [facility] medical records  [id number]        Your Vitals Were     Pulse Temperature Respirations Height Last Period Pulse Oximetry    69 98.8  F (37.1  C) (Oral) 16 1.715 m (5' 7.5\") 06/01/1988 (Approximate) 96%    BMI (Body Mass Index)                   26.74 kg/m2            Blood Pressure from Last 3 Encounters:   09/12/18 126/65   09/10/18 146/68   08/15/18 148/75    Weight from Last 3 Encounters:   09/12/18 78.6 kg (173 lb 5 oz)   08/27/18 79.4 kg (175 lb)   08/15/18 79.4 kg (175 lb)                 Today's Medication Changes          These changes are accurate as of 9/12/18  1:48 PM.  If you have any questions, ask your nurse or doctor.               These medicines have changed or have updated prescriptions.        Dose/Directions    sertraline 50 MG tablet   Commonly known as:  ZOLOFT   This may have changed:  how much to take   Used for:  Adjustment disorder with depressed mood        Dose:  50 mg   Take 1 tablet (50 mg) by mouth daily   Quantity:  90 tablet   Refills:  3                Primary Care Provider Office Phone # Fax #    Jennifer Barraza [phone]       [address]  [facility] [address]        Goals        Diet    Have 3 meals a day     " Notes - Note created  8/22/2018  1:18 PM by Trisha Timmons RD    Goal Statement: Have three meals per day, use the plate method to include 30-60 grams of carbohydrate at meals and 15-30 grams of carbohydrate at snacks. Include a lean source of protein, heart healthy fat and vegetables at meals.  Measure of Success: using above method majority of the time  Supportive Steps to Achieve: Plan ahead, meal plan, support from friend whom she lives with, read labels  Barriers: none  Strengths: motivated   Patient expressed understanding of goal: yes, verbalized             General    Being Active (pt-stated)     Notes - Note created  8/22/2018  1:21 PM by Trisha Timmons RD    Continue working with a  twice per week and walk on other days for a goal of 30 minutes.      Healthy Eating (pt-stated)     Notes - Note created  8/22/2018  1:20 PM by Trisha Timmons RD    Goal Statement: choose high fiber carbohydrate most of the time  Supportive Steps to Achieve: read labels and meal plan  Strengths: motivated and demonstrates understanding              Equal Access to Services     GENE NOWAK : Hadii brayan guo hadasho Soomaali, waaxda luqadaha, qaybta kaalmada adeegyada, etta lu . So [facility] [phone].    ATENCIÓN: Si habla español, tiene a jason disposición servicios gratuitos de asistencia lingüística. Llame al [phone].    We comply with applicable federal civil rights laws and [place] laws. We do not discriminate on the basis of race, color, national origin, age, disability, sex, sexual orientation, or gender identity.            Thank you!     Thank you for choosing [facility]  for your care. Our goal is always to provide you with excellent care. Hearing back from our patients is one way we can continue to improve our services. Please take a few minutes to complete the written survey that you may receive in the mail after your visit with us. Thank you!              Your Updated Medication List - Protect others around you: Learn how to safely use, store and throw away your medicines at www.disposemymeds.org.          This list is accurate as of 9/12/18  1:48 PM.  Always use your most recent med list.                   Brand Name Dispense Instructions for use Diagnosis    acetaminophen 500 MG Caps      Take 1,000 mg by mouth nightly as needed Take 500-1,000 mg by mouth every 6 hours if needed.        ARTIFICIAL TEARS OP      Apply 1 drop to eye as needed        BENEFIBER Powd      2 teaspoons daily        blood glucose monitoring meter device kit    no brand specified    1 kit    Use to test blood sugar 2times daily or as directed.    Type 2 diabetes mellitus with stage 3 chronic kidney disease, without long-term current use of insulin (H)       blood glucose monitoring test strip    no brand specified    200 each    Use to test blood sugar 2 times daily, before breakfast and before bedtime    Type 2 diabetes mellitus with stage 3 chronic kidney disease, without long-term current use of insulin (H)       calcitRIOL 0.5 MCG capsule    ROCALTROL    90 capsule    Take 1 capsule (0.5 mcg) by mouth daily    Postablative hypoparathyroidism (H)       ciprofloxacin-dexamethasone otic suspension    CIPRODEX    5.6 mL    Place 4 drops Into the left ear three times a week    Other infective chronic otitis externa of left ear, Tympanic membrane perforation, left, Sensorineural hearing loss (SNHL) of both ears       * clotrimazole 1 % cream    LOTRIMIN     Apply topically 2 times daily as needed Reported on 4/25/2017        * clotrimazole 1 % solution    LOTRIMIN    28 mL    Apply 1 mL topically 2 times daily for 14 days Correct instruction - 4 drops LEFT ear twice daily for 14 days    Other infective chronic otitis externa of left ear, S/P tympanoplasty       ELIQUIS 2.5 MG tablet   Generic drug:  apixaban ANTICOAGULANT      Take 2.5 mg by mouth 2 times daily        estradiol 0.1  MG/GM cream    ESTRACE VAGINAL    42.5 g    Place 2 g vaginally twice a week    Atrophic vaginitis       ezetimibe 10 MG tablet    ZETIA    90 tablet    TAKE 1 TABLET BY MOUTH EVERY DAY OR AS DIRECTED BY DR WOLF    Hyperlipidemia LDL goal <70, Benign essential hypertension       ferrous gluconate 324 (38 Fe) MG tablet    FERGON    90 tablet    Take 1 tablet (324 mg) by mouth 3 times daily (with meals)    Liver replaced by transplant (H)       folic acid 1 MG tablet    FOLVITE    100 tablet    Take 1 tablet (1 mg) by mouth daily    Liver replaced by transplant (H)       furosemide 20 MG tablet    LASIX    45 tablet    TAKE 1/2 TABLET BY MOUTH EVERY DAY    CKD (chronic kidney disease) stage 3, GFR 30-59 ml/min, Liver replaced by transplant (H)       hydrALAZINE 25 MG tablet    APRESOLINE    270 tablet    Take 0.5 tablets (12.5 mg) by mouth 3 times daily as needed , if systolic blood pressure is more than 140 mmHg.    HTN (hypertension)       ketoconazole 2 % cream    NIZORAL    15 g    Apply topically 2 times daily To angles of mouth    Angular cheilitis       LOVAZA 1 g capsule   Generic drug:  omega-3 acid ethyl esters     360 capsule    Take 1 capsule (1 g) by mouth 2 times daily    Hypertriglyceridemia       meclizine 25 MG tablet    ANTIVERT    30 tablet    Take 1 tablet (25 mg) by mouth as needed    Dizziness       metoprolol succinate 50 MG 24 hr tablet    TOPROL-XL    90 tablet    TAKE 1 TABLET (50 MG) BY MOUTH DAILY    Paroxysmal atrial fibrillation (H)       neomycin-polymyxin-hydrocortisone 3.5-46184-5 otic suspension    CORTISPORIN    10 mL    Place 4 drops Into the left ear 4 times daily    Acute swimmer's ear of left side       nitroFURantoin (macrocrystal-monohydrate) 100 MG capsule    MACROBID    14 capsule    Take 1 capsule (100 mg) by mouth 2 times daily For one week at onset of UTI symptoms    Urinary tract infection without hematuria, site unspecified       predniSONE 5 MG tablet    DELTASONE     90 tablet    Take 1 tablet (5 mg) by mouth daily    Thyroid cancer (H), Liver replaced by transplant (H)       PRESERVISION AREDS 2 Caps      Take 1 capsule by mouth 2 times daily        sertraline 50 MG tablet    ZOLOFT    90 tablet    Take 1 tablet (50 mg) by mouth daily    Adjustment disorder with depressed mood       sirolimus 1 MG Tabs tablet     45 tablet    Take 1 tablet (1 mg) by mouth every other day    Liver replaced by transplant (H)       SUMAtriptan 50 MG tablet    IMITREX    30 tablet    Take 1 tablet (50 mg) by mouth at onset of headache for migraine repeat after 2 hours if needed.    Migraine without aura and without status migrainosus, not intractable       * SYNTHROID 150 MCG tablet   Generic drug:  levothyroxine      Take 225 mcg by mouth on Mondays        * levothyroxine 150 MCG tablet    SYNTHROID/LEVOTHROID    102 tablet    TAKE 1.5 TABLETS BY MOUTH ON MONDAYS. AND 1 TABLET DAILY, THE OTHER DAYS OF THE WEEK.    Malignant neoplasm of thyroid gland (H)       triamcinolone 0.1 % cream    KENALOG     Apply topically 2 times daily as needed for irritation        ursodiol 250 MG tablet    ACTIGALL    180 tablet    Take 1 tablet (250 mg) by mouth 2 times daily    Liver replaced by transplant (H)       voriconazole 200 MG tablet    VFEND    60 tablet    Take 1 tablet (200 mg) by mouth 2 times daily    Coccidioidal pneumonitis (H)       * Notice:  This list has 4 medication(s) that are the same as other medications prescribed for you. Read the directions carefully, and ask your doctor or other care provider to review them with you.

## 2018-09-12 NOTE — Clinical Note
Hey,  She needs to follow up in mid-May with a chest CT prior please.  She is in room 13 waiting to schedule.  Thanks H

## 2018-09-21 ENCOUNTER — RADIANT APPOINTMENT (OUTPATIENT)
Dept: GENERAL RADIOLOGY | Facility: CLINIC | Age: 68
End: 2018-09-21
Attending: FAMILY MEDICINE
Payer: MEDICARE

## 2018-09-21 ENCOUNTER — OFFICE VISIT (OUTPATIENT)
Dept: FAMILY MEDICINE | Facility: CLINIC | Age: 68
End: 2018-09-21
Payer: MEDICARE

## 2018-09-21 VITALS
HEART RATE: 81 BPM | RESPIRATION RATE: 16 BRPM | SYSTOLIC BLOOD PRESSURE: 126 MMHG | WEIGHT: 173.4 LBS | BODY MASS INDEX: 26.28 KG/M2 | HEIGHT: 68 IN | TEMPERATURE: 98.2 F | DIASTOLIC BLOOD PRESSURE: 73 MMHG

## 2018-09-21 DIAGNOSIS — M54.6 ACUTE RIGHT-SIDED THORACIC BACK PAIN: ICD-10-CM

## 2018-09-21 DIAGNOSIS — S70.01XD CONTUSION OF RIGHT HIP, SUBSEQUENT ENCOUNTER: ICD-10-CM

## 2018-09-21 DIAGNOSIS — H72.92 PERFORATION OF TYMPANIC MEMBRANE, LEFT: ICD-10-CM

## 2018-09-21 DIAGNOSIS — S00.83XD CONTUSION OF OTHER PART OF HEAD, SUBSEQUENT ENCOUNTER: ICD-10-CM
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
## 2018-09-21 DIAGNOSIS — N18.30 TYPE 2 DIABETES MELLITUS WITH STAGE 3 CHRONIC KIDNEY DISEASE, WITHOUT LONG-TERM CURRENT USE OF INSULIN (H): ICD-10-CM

## 2018-09-21 DIAGNOSIS — N18.30 CKD (CHRONIC KIDNEY DISEASE) STAGE 3, GFR 30-59 ML/MIN (H): ICD-10-CM

## 2018-09-21 DIAGNOSIS — E11.22 TYPE 2 DIABETES MELLITUS WITH STAGE 3 CHRONIC KIDNEY DISEASE, WITHOUT LONG-TERM CURRENT USE OF INSULIN (H): ICD-10-CM

## 2018-09-21 DIAGNOSIS — H90.3 SENSORINEURAL HEARING LOSS (SNHL) OF BOTH EARS: ICD-10-CM

## 2018-09-21 PROBLEM — F43.21 ADJUSTMENT DISORDER WITH DEPRESSED MOOD: Status: RESOLVED | Noted: 2018-06-01 | Resolved: 2018-09-21

## 2018-09-21 PROBLEM — K63.1 PERFORATION BOWEL (H): Status: RESOLVED | Noted: 2017-08-31 | Resolved: 2018-09-21

## 2018-09-21 PROBLEM — B38.2: Status: RESOLVED | Noted: 2017-03-01 | Resolved: 2018-09-21

## 2018-09-21 PROCEDURE — 72072 X-RAY EXAM THORAC SPINE 3VWS: CPT | Mod: FY

## 2018-09-21 PROCEDURE — 99214 OFFICE O/P EST MOD 30 MIN: CPT | Performed by: FAMILY MEDICINE

## 2018-09-21 ASSESSMENT — PAIN SCALES - GENERAL: PAINLEVEL: MODERATE PAIN (5)

## 2018-09-21 NOTE — PROGRESS NOTES
SUBJECTIVE:   Luz Thompson is a 68 year old female who presents to clinic today for the following health issues:      ED/UC Followup:    Facility:  [facility]  Date of visit: 9/13/18  Reason for visit: tripped and fell with head, back and hip contusion. Fainted while in emergency department and was evaluated with CT head, neck and lumbar spine. Hip x ray.   Current Status: Wants to be checked for follow up      Admission Date: 9/13/2018  Discharge Date: 9/14/2018    The patient will be discharged from [facility] to home.    PRINCIPAL DIAGNOSIS CAUSING ADMISSION:  Principal Problem:  Fall  Active Problems:  History of thyroid cancer  Adjustment disorder with depressed mood  CKD (chronic kidney disease) stage 3, GFR 30-59 ml/min (HC)  Hx of liver transplant (HC)  Hyperlipidemia  Hypothyroidism  Hypertension goal BP (blood pressure) < 140/80  History of CVA in adulthood    BRIEF HOSPITAL COURSE: Virginia is a 68 y.o. female with past medical history of liver transplant, real-time CV no a on apixaban, hypothyroidism and hypertension who was admitted after a fall.s she had a mechanical fall, catching her foot on the stairs and hitting her forehead on the fridge. Patient had some low blood pressure and was dizzy in the ER. Head CT and cervical spine CT were negative for acute pathology. She was referred for observation.    Her blood pressure normalized in the hospital and she did not have any more symptoms of dizziness. She was assessed by Occupational Therapy and physical therapy. She did well and ambulated without recurring symptoms but was advised to continue outpatient physical therapy. The patient was instructed to hold Metoprolol and Lasix if she has dizziness and to check her blood pressure. She will keep a log of daily blood pressure and bring it to her primary care visit.       Diabetes Follow-up      Patient is checking blood sugars: stable    Diabetic concerns: None     Symptoms of hypoglycemia  (low blood sugar): none     Paresthesias (numbness or burning in feet) or sores: No     Date of last diabetic eye exam: up to date     BP Readings from Last 2 Encounters:   09/21/18 126/73   09/12/18 126/65     Hemoglobin A1C (%)   Date Value   05/18/2018 6.8 (H)     LDL Cholesterol Calculated (mg/dL)   Date Value   05/18/2018     Cannot estimate LDL when triglyceride exceeds 400 mg/dL   10/05/2017     Cannot estimate LDL when triglyceride exceeds 400 mg/dL     LDL Cholesterol Direct (mg/dL)   Date Value   05/18/2018 150 (H)   10/05/2017 135 (H)       Diabetes Management Resources  Hyperlipidemia Follow-Up      Rate your low fat/cholesterol diet?: good    Taking statin?  No    Other lipid medications/supplements?:  none    Hypertension Follow-up      Outpatient blood pressures are not being checked.    Low Salt Diet: no added salt    Chronic Kidney Disease Follow-up      Current NSAID use?  Yes:   ibuprofen (Motrin)  Frequency: occasional use of 200 mg dose.     Hypothyroidism Follow-up      Since last visit, patient describes the following symptoms: Weight stable, no hair loss, no skin changes, no constipation, no loose stools      Problem list and histories reviewed & adjusted, as indicated.  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     Prophylactic antibiotic     High risk medication use     Statin intolerance     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     SNHL (sensorineural hearing loss)     Abnormal liver function tests     Liver replaced by transplant (H)     Type 2 diabetes, HbA1c goal < 7% (H)     Hyperlipidemia LDL goal <70     Hypertension goal BP (blood pressure) < 140/80     Postoperative hypothyroidism     Type 2 diabetes mellitus with stage 3 chronic kidney disease, without  "long-term current use of insulin (H)     Age-related osteoporosis without current pathological fracture     Perforation of tympanic membrane, left     Other infective chronic otitis externa of left ear     CKD (chronic kidney disease) stage 3, GFR 30-59 ml/min     Past Surgical History:   Procedure Laterality Date     APPENDECTOMY  1961     BIOPSY       CATARACT IOL, RT/LT      RE12/19/2013, LE12/10/2013 - Toric lenses     CHOLECYSTECTOMY  1991     COLONOSCOPY  3/10/2014    Procedure: COLONOSCOPY;;  Surgeon: Gentry Ramirez MD;  Location: [facility]     ear drum repair       ENDOBRONCHIAL ULTRASOUND FLEXIBLE N/A 9/29/2017    Procedure: ENDOBRONCHIAL ULTRASOUND FLEXIBLE;  Flexible Bronchoscopy, Endobronchial Ultrasound, Transbronchial Needle Aspiration ;  Surgeon: Eden Clinton MD;  Location: [facility] OR     ENDOSCOPIC RETROGRADE CHOLANGIOPANCREATOGRAM  9/19/2013    Procedure: ENDOSCOPIC RETROGRADE CHOLANGIOPANCREATOGRAM;  Endoscopic Retrograde Cholangiopancreatogram with single balloon enteroscopy, ballon sweep of bile duct;  Surgeon: Brett Membreno MD;  Location: [facility] OR      KNEE SCOPE,MED/LAT MENISECTOMY  8/10/12    Right, partial medial menisectomy only     KNEE SURGERY  1966    R knee     PICC INSERTION  9/18/2013    4fr SL PASV PICC, 40cm (1cm external) in the R basilic vein w/ tip in the low SVC     PICC INSERTION  2/21/2014    5 fr DL BioFlo Navilyst PICC, 46 cm (3 cm external) in the L basilic vein w/ tip in the SVC RA junction.     THYROIDECTOMY  3/2010     TRANSPLANT LIVER RECIPIENT LIVING UNRELATED         Social History   Substance Use Topics     Smoking status: Former Smoker     Packs/day: 1.00     Years: 18.00     Types: Cigarettes     Quit date: 4/12/1985     Smokeless tobacco: Never Used     Alcohol use 0.0 oz/week     0 Standard drinks or equivalent per week      Comment: rare - \"I toast at weddings\"     Family History   Problem Relation Age of Onset     Hypertension Mother      Endometrial Cancer " Mother      Hyperlipidemia Mother      Prostate Cancer Father      Macular Degeneration Father      Cancer - colorectal Maternal Grandmother      in her 80's, has surgery and removal of part of kidney,  at age 98     HEART DISEASE Maternal Grandfather       at 98     Glaucoma Maternal Grandfather      Cerebrovascular Disease Paternal Grandmother      in her 80's     Hypertension Paternal Grandmother      HEART DISEASE Paternal Grandfather      MI     Alzheimer Disease Paternal Grandfather      Allergies Son      Neurologic Disorder Daughter      Migraines     Breast Cancer Other      Anesthesia Reaction No family hx of      Crohn Disease No family hx of      Ulcerative Colitis No family hx of          Current Outpatient Prescriptions   Medication Sig Dispense Refill     acetaminophen 500 MG CAPS Take 1,000 mg by mouth nightly as needed Take 500-1,000 mg by mouth every 6 hours if needed.        blood glucose monitoring (NO BRAND SPECIFIED) meter device kit Use to test blood sugar 2times daily or as directed. 1 kit 0     blood glucose monitoring (NO BRAND SPECIFIED) test strip Use to test blood sugar 2 times daily, before breakfast and before bedtime 200 each 3     calcitRIOL (ROCALTROL) 0.5 MCG capsule Take 1 capsule (0.5 mcg) by mouth daily 90 capsule 3     ciprofloxacin-dexamethasone (CIPRODEX) otic suspension Place 4 drops Into the left ear three times a week 5.6 mL 6     clotrimazole (LOTRIMIN) 1 % cream Apply topically 2 times daily as needed Reported on 2017       ELIQUIS 2.5 MG tablet Take 2.5 mg by mouth 2 times daily        estradiol (ESTRACE VAGINAL) 0.1 MG/GM cream Place 2 g vaginally twice a week 42.5 g 11     ezetimibe (ZETIA) 10 MG tablet TAKE 1 TABLET BY MOUTH EVERY DAY OR AS DIRECTED BY DR WOLF 90 tablet 3     ferrous gluconate (FERGON) 324 (38 Fe) MG tablet Take 1 tablet (324 mg) by mouth 3 times daily (with meals) 90 tablet 0     folic acid (FOLVITE) 1 MG tablet Take 1 tablet (1 mg)  by mouth daily 100 tablet 3     furosemide (LASIX) 20 MG tablet TAKE 1/2 TABLET BY MOUTH EVERY DAY 45 tablet 3     hydrALAZINE (APRESOLINE) 25 MG tablet Take 0.5 tablets (12.5 mg) by mouth 3 times daily as needed , if systolic blood pressure is more than 140 mmHg. 270 tablet 3     Hypromellose (ARTIFICIAL TEARS OP) Apply 1 drop to eye as needed       ketoconazole (NIZORAL) 2 % cream Apply topically 2 times daily To angles of mouth 15 g 0     levothyroxine (SYNTHROID/LEVOTHROID) 150 MCG tablet TAKE 1.5 TABLETS BY MOUTH ON MONDAYS. AND 1 TABLET DAILY, THE OTHER DAYS OF THE WEEK. 102 tablet 3     meclizine (ANTIVERT) 25 MG tablet Take 1 tablet (25 mg) by mouth as needed 30 tablet 11     metoprolol succinate (TOPROL-XL) 50 MG 24 hr tablet TAKE 1 TABLET (50 MG) BY MOUTH DAILY 90 tablet 3     Multiple Vitamins-Minerals (PRESERVISION AREDS 2) CAPS Take 1 capsule by mouth 2 times daily       neomycin-polymyxin-hydrocortisone (CORTISPORIN) 3.5-88187-4 otic suspension Place 4 drops Into the left ear 4 times daily 10 mL 0     nitroFURantoin, macrocrystal-monohydrate, (MACROBID) 100 MG capsule Take 1 capsule (100 mg) by mouth 2 times daily For one week at onset of UTI symptoms 14 capsule 6     omega-3 acid ethyl esters (LOVAZA) 1 g capsule Take 1 capsule (1 g) by mouth 2 times daily 360 capsule 3     predniSONE (DELTASONE) 5 MG tablet Take 1 tablet (5 mg) by mouth daily 90 tablet 3     RAPAMUNE (BRAND) 1 MG PO TABLET Take 1 tablet (1 mg) by mouth every other day 45 tablet 3     sertraline (ZOLOFT) 50 MG tablet Take 1 tablet (50 mg) by mouth daily (Patient taking differently: Take 100 mg by mouth daily ) 90 tablet 3     SUMAtriptan (IMITREX) 50 MG tablet Take 1 tablet (50 mg) by mouth at onset of headache for migraine repeat after 2 hours if needed. 30 tablet 3     triamcinolone (KENALOG) 0.1 % cream Apply topically 2 times daily as needed for irritation       ursodiol (ACTIGALL) 250 MG tablet Take 1 tablet (250 mg) by mouth 2  times daily 180 tablet 3     voriconazole (VFEND) 200 MG tablet Take 1 tablet (200 mg) by mouth 2 times daily 60 tablet 2     Wheat Dextrin (BENEFIBER) POWD 2 teaspoons daily       levothyroxine (SYNTHROID) 150 MCG tablet Take 225 mcg by mouth on Mondays       Allergies   Allergen Reactions     Fluconazole Hives and Itching     Ciprofloxacin Anxiety and Other (See Comments)     Irregular heart beat     Azithromycin Itching     Benadryl [Diphenhydramine Hcl]      Insomnia      Cellcept Diarrhea     Ciprofloxacin Other (See Comments)     Insomnia, mood lability     Codeine      Psych disturbance     Codeine      Diphenhydramine Other (See Comments)     Doxycycline      Lansoprazole Diarrhea     Levaquin [Levofloxacin] Other (See Comments)     Headache, hyperactivity     Lisinopril Cough     Methotrexate      Sores     Methotrexate      Morphine Sulfate Itching     Mycophenolate Diarrhea     No Clinical Screening - See Comments      Simvastatin Muscle Pain (Myalgia)     severe     Cephalexin Rash     Fever and skin burning     Penicillin G Itching and Rash     Tolectin [Nsaids] Rash     Tramadol Rash     Recent Labs   Lab Test  07/30/18   0913  07/30/18   0855  05/19/18   0629  05/18/18   0731  05/14/18   1019  04/17/18   0942   10/05/17   0907   08/22/17   0905  07/13/17   0843   A1C   --    --    --   6.8*   --    --    --    --    --    --    --    LDL   --    --    --   Cannot estimate LDL when triglyceride exceeds 400 mg/dL  150*   --    --    --   Cannot estimate LDL when triglyceride exceeds 400 mg/dL  135*   --   Cannot estimate LDL when triglyceride exceeds 400 mg/dL  174*   --    HDL   --    --    --   49*   --    --    --   50   --   49*   --    TRIG   --    --    --   557*   --    --    --   419*   --   447*   --    ALT  37   --    --    --   43  42   < >  33   < >  107*  105*   CR  1.11*   --   1.29*  1.22*  1.40*  1.74*   --   1.38*   < >  1.29*  1.30*   GFRESTIMATED  49*   --   41*  44*  37*  29*   --  "  38*   < >  41*  41*   GFRESTBLACK  59*   --   50*  53*  45*  35*   --   46*   < >  50*  49*   POTASSIUM  3.3*   --   4.1  3.6  3.3*  3.9   --   4.0   < >  3.6  3.6   TSH   --   8.64*   --    --    --    --    --    --    --    --   3.66    < > = values in this interval not displayed.      BP Readings from Last 3 Encounters:   09/21/18 126/73   09/12/18 126/65   09/10/18 146/68    Wt Readings from Last 3 Encounters:   09/21/18 173 lb 6.4 oz (78.7 kg)   09/12/18 173 lb 5 oz (78.6 kg)   08/27/18 175 lb (79.4 kg)                  Labs reviewed in EPIC    Reviewed and updated as needed this visit by clinical staff  Tobacco  Allergies  Meds  Med Hx  Surg Hx  Fam Hx  Soc Hx      Reviewed and updated as needed this visit by Provider         ROS:  Constitutional, HEENT, cardiovascular, pulmonary, gi and gu systems are negative, except as otherwise noted.    OBJECTIVE:     /73 (BP Location: Right arm, Patient Position: Sitting, Cuff Size: Adult Regular)  Pulse 81  Temp 98.2  F (36.8  C) (Oral)  Resp 16  Ht 5' 7.5\" (1.715 m)  Wt 173 lb 6.4 oz (78.7 kg)  LMP 06/01/1988 (Approximate)  BMI 26.76 kg/m2  Body mass index is 26.76 kg/(m^2).  GENERAL APPEARANCE: healthy, alert and no distress, some what week and slow gait. Needed help to get on table. Poor balance  EYES: Eyes grossly normal to inspection, PERRL and conjunctivae and sclerae normal  HENT: hearing aid in right ear not removed. Left ear canal clear, nose and mouth without ulcers or lesions, oropharynx clear and oral mucous membranes moist. Healing abrasion on right fore head.   NECK: no adenopathy, no asymmetry, masses, or scars and thyroid normal to palpation  RESP: lungs clear to auscultation - no rales, rhonchi or wheezes  CV: regular rates and rhythm, normal S1 S2, no S3 or S4, no murmur, click or rub, no peripheral edema and peripheral pulses strong  ABDOMEN: soft, nontender, no hepatosplenomegaly, no masses and bowel sounds normal  MS: no " musculoskeletal defects are noted and gait is age appropriate without ataxia but somewhat antalgic. Tender over right thoracic spine and rib cage around T-10 to T-12.   SKIN: no suspicious lesions or rashes, bruise on right buttocks and over lateral hip area. Normal ROM.   NEURO: Decreased strength and tone, sensory exam grossly normal, mentation intact and speech normal  PSYCH: mentation appears normal and affect normal/bright     Diagnostic Test Results:  Results for orders placed or performed in visit on 09/12/18   CT Chest w/o contrast    Narrative    EXAM:  CT CHEST W/O CONTRAST . 9/12/2018 12:49 PM     TECHNIQUE:  Helical CT images from the thoracic inlet through the  upper abdomen were obtained without intravenous contrast.     COMPARISON: CT chest 6/27/2018, 3/27/2018, 7/31/2017, 4/24/2017,  5/13/2016, 7/3/2015, PET CT 8/8/2017    HISTORY:   ; Lung nodule     FINDINGS:  LUNGS: The central tracheobronchial tree is clear. No pleural effusion  or pneumothorax. Unchanged 12 mm lesion in the lingula (series 4,  image 245), able compared to the prior exam of 6/27/2018 (however this  measured 8 mm on 4/24/2017). Stable 4 mm solid nodule left lower lobe  (series 4, image 27), which is unchanged from 4/24/2017. Clustered  calcified and noncalcified granulomas in the right lower lobe and  right upper lobe are unchanged compared to the prior exam on  6/20/2017.     MEDIASTINUM: The visualized thyroid gland is unremarkable in  appearance. The heart is normal in size. No pericardial effusion. The  ascending aorta and main pulmonary artery normal in caliber. No  mediastinal or axillary lymphadenopathy.    UPPER ABDOMEN: Limited evaluation of the upper abdomen. Surgical  changes of liver transplant. Unchanged pneumobilia. Atrophic appearing  kidneys. Fatty replacement of the pancreatic parenchyma.    BONES/SOFT TISSUES: Convex right scoliotic deformity of the thoracic  spine. No acute osseous abnormalities or suspicious  "osseous lesions.      Impression    IMPRESSION:   1. Unchanged 12 mm nodule in the lingula since 3/27/2018. However this  nodule previously measured 8 mm on 4/24/2017 and was FDG avid on the  comparison PET/CT on 8/8/2017. Recommend continued close follow-up  (3-6 months) and/or soft tissue biopsy.  2. Extensive granulomatous disease, mostly clustered in the right  lower lobe, is unchanged.    I have personally reviewed the examination and initial interpretation  and I agree with the findings.    NIDIA LIANG MD     *Note: Due to a large number of results and/or encounters for the requested time period, some results have not been displayed. A complete set of results can be found in Results Review.       ASSESSMENT/PLAN:         Tobacco Cessation:   reports that she quit smoking about 33 years ago. Her smoking use included Cigarettes. She has a 18.00 pack-year smoking history. She has never used smokeless tobacco.      BMI:   Estimated body mass index is 26.76 kg/(m^2) as calculated from the following:    Height as of this encounter: 5' 7.5\" (1.715 m).    Weight as of this encounter: 173 lb 6.4 oz (78.7 kg).           ICD-10-CM    1. Fall on same level from slipping, tripping or stumbling, subsequent encounter W01.0XXD Tripped and may have lost her balance more easily. Cleared by OT and physical therapy in hospital    2. Acute right-sided thoracic back pain M54.6 XR Thoracic Spine 3 Views- persistent pain in area not covered by imaging. Will check spine.    3. Contusion of right hip, subsequent encounter S70.01XD Healing with no damage to hip joint   4. Contusion of other part of head, subsequent encounter S00.83XD May have had a slight concussion but no residual confusion.    5. Sensorineural hearing loss (SNHL) of both ears H90.3 Not wearing left hearing aid due to perforation and had more difficulty hearing normal speech volume   6. Type 2 diabetes mellitus with stage 3 chronic kidney disease, without long-term " current use of insulin (H) E11.22 Follow up with endocrine next week.     N18.3    7. Perforation of tympanic membrane, left H72.92 Will have this rechecked in 1-2 months. Flying soon, so repair delayed.    8. CKD (chronic kidney disease) stage 3, GFR 30-59 ml/min N18.3 Stable. Limit ibuprofen use.        CONSULTATION/REFERRAL to endocrine and liver specialist as scheduled.   FUTURE LABS:       - Schedule a fasting blood draw next week per specialty so will not get her labs done today.   FUTURE APPOINTMENTS:       - Follow-up visit in 3 months or sooner if any questions or concerns.   See Patient Instructions    Jennifer Barraza MD  [facility]

## 2018-09-21 NOTE — MR AVS SNAPSHOT
After Visit Summary   9/21/2018    Luz Thompson    MRN: [id number]           Patient Information     Date Of Birth          1950        Visit Information        Provider Department      9/21/2018 11:00 AM Jennifer Barraza MD [facility]        Today's Diagnoses     Fall on same level from slipping, tripping or stumbling, subsequent encounter    -  1    Acute right-sided thoracic back pain        Contusion of right hip, subsequent encounter        Contusion of other part of head, subsequent encounter        Sensorineural hearing loss (SNHL) of both ears        Type 2 diabetes mellitus with stage 3 chronic kidney disease, without long-term current use of insulin (H)        Perforation of tympanic membrane, left        CKD (chronic kidney disease) stage 3, GFR 30-59 ml/min           Follow-ups after your visit        Follow-up notes from your care team     Return in about 3 months (around 12/21/2018) for medication follow up, Physical Exam, BP Recheck.      Your next 10 appointments already scheduled     Oct 08, 2018  1:15 PM CDT   Return Visit with Randell Mejia MD   [facility] ([facility])    [address] [place] [id number]   [phone]            May 14, 2019 12:20 PM CDT   CT CHEST W/O CONTRAST with UCCT2   [facility] ([facility])    [address] [id number] [phone]           How do I prepare for my exam? (Food and drink instructions) No Food and Drink Restrictions.  How do I prepare for my exam? (Other instructions) You do not need to do anything special to prepare for this exam. For a sinus scan: Use your nose spray (nasal decongestant spray) as directed.  What should I wear: Please wear loose clothing, such as a sweat suit or jogging clothes. Avoid snaps, zippers and other metal. We may ask you to undress and put on a hospital gown.   How long does the exam take: Most scans take less than 20 minutes.  What should I bring: Please bring any scans or X-rays taken at other hospitals, if similar tests were done. Also bring a list of your medicines, including vitamins, minerals and over-the-counter drugs. It is safest to leave personal items at home.  Do I need a : No  is needed.  What do I need to tell my doctor? Be sure to tell your doctor: * If you have any allergies. * If there s any chance you are pregnant. * If you are breastfeeding.  What should I do after the exam: No restrictions, You may resume normal activities.  What is this test: A CT (computed tomography) scan is a series of pictures that allows us to look inside your body. The scanner creates images of the body in cross sections, much like slices of bread. This helps us see any problems more clearly.  Who should I call with questions: If you have any questions, please call the Imaging Department where you will have your exam. Directions, parking instructions, and other information is available on our website, [email or web address].            May 14, 2019 12:40 PM CDT   (Arrive by 12:25 PM)   Return Visit with LIZ Bush   [facility] ([facility])    [address]  [address]  [facility] [id number] [phone]            May 15, 2019 10:30 AM CDT   (Arrive by 10:15 AM)   Return Visit with Crystal Montero MD   [facility] ([facility])    [address]  [address]  [facility] [id number] [phone]            May 22, 2019  1:30 PM CDT   Diabetes Education with Trisha Timmons RD   [facility], [place],  Diabetes Education ([facility])    [address]  [address]  [facility] [id number] [phone]              Who to contact     If you have questions or need follow  "up information about today's clinic visit or your schedule please contact [facility] directly at [phone].  Normal or non-critical lab and imaging results will be communicated to you by [employer], letter or phone within 4 business days after the clinic has received the results. If you do not hear from us within 7 days, please contact the clinic through [employer] or phone. If you have a critical or abnormal lab result, we will notify you by phone as soon as possible.  Submit refill requests through [employer] or call your pharmacy and they will forward the refill request to us. Please allow 3 business days for your refill to be completed.          Additional Information About Your Visit        [employer] Information     [employer] gives you secure access to your electronic health record. If you see a primary care provider, you can also send messages to your care team and make appointments. If you have questions, please call your primary care clinic.  If you do not have a primary care provider, please call [phone] and they will assist you.        Care EveryWhere ID     This is your Care EveryWhere ID. This could be used by other organizations to access your [place] medical records  [id number]        Your Vitals Were     Pulse Temperature Respirations Height Last Period BMI (Body Mass Index)    81 98.2  F (36.8  C) (Oral) 16 5' 7.5\" (1.715 m) 06/01/1988 (Approximate) 26.76 kg/m2       Blood Pressure from Last 3 Encounters:   09/21/18 126/73   09/12/18 126/65   09/10/18 146/68    Weight from Last 3 Encounters:   09/21/18 173 lb 6.4 oz (78.7 kg)   09/12/18 173 lb 5 oz (78.6 kg)   08/27/18 175 lb (79.4 kg)                 Today's Medication Changes          These changes are accurate as of 9/21/18  1:32 PM.  If you have any questions, ask your nurse or doctor.               These medicines have changed or have updated prescriptions.        Dose/Directions    sertraline 50 MG tablet   Commonly known as:  " ZOLOFT   This may have changed:  how much to take   Used for:  Adjustment disorder with depressed mood        Dose:  50 mg   Take 1 tablet (50 mg) by mouth daily   Quantity:  90 tablet   Refills:  3                Primary Care Provider Office Phone # Fax #    Jennifer Amparo Barraza [phone]       [address]  [place] [facility] [address]        Goals        Diet    Have 3 meals a day     Notes - Note created  8/22/2018  1:18 PM by Trisha Timmons, AJ    Goal Statement: Have three meals per day, use the plate method to include 30-60 grams of carbohydrate at meals and 15-30 grams of carbohydrate at snacks. Include a lean source of protein, heart healthy fat and vegetables at meals.  Measure of Success: using above method majority of the time  Supportive Steps to Achieve: Plan ahead, meal plan, support from friend whom she lives with, read labels  Barriers: none  Strengths: motivated   Patient expressed understanding of goal: yes, verbalized             General    Being Active (pt-stated)     Notes - Note created  8/22/2018  1:21 PM by Trisha Timmons RD    Continue working with a  twice per week and walk on other days for a goal of 30 minutes.      Healthy Eating (pt-stated)     Notes - Note created  8/22/2018  1:20 PM by Trisha Timmons RD    Goal Statement: choose high fiber carbohydrate most of the time  Supportive Steps to Achieve: read labels and meal plan  Strengths: motivated and demonstrates understanding              Equal Access to Services     GENE NOWAK AH: Hadii brayan tyo Somelecioali, waaxda luqadaha, qaybta kaalmada дмитрийyada, etta lu . So [facility] [phone].    ATENCIÓN: Si habla español, tiene a jason disposición servicios gratuitos de asistencia lingüística. Llame al [phone].    We comply with applicable federal civil rights laws and [place] laws. We do not discriminate on the basis of race, color, national origin, age, disability, sex,  sexual orientation, or gender identity.            Thank you!     Thank you for choosing [facility]  for your care. Our goal is always to provide you with excellent care. Hearing back from our patients is one way we can continue to improve our services. Please take a few minutes to complete the written survey that you may receive in the mail after your visit with us. Thank you!             Your Updated Medication List - Protect others around you: Learn how to safely use, store and throw away your medicines at www.disposemymeds.org.          This list is accurate as of 9/21/18  1:32 PM.  Always use your most recent med list.                   Brand Name Dispense Instructions for use Diagnosis    acetaminophen 500 MG Caps      Take 1,000 mg by mouth nightly as needed Take 500-1,000 mg by mouth every 6 hours if needed.        ARTIFICIAL TEARS OP      Apply 1 drop to eye as needed        BENEFIBER Powd      2 teaspoons daily        blood glucose monitoring meter device kit    no brand specified    1 kit    Use to test blood sugar 2times daily or as directed.    Type 2 diabetes mellitus with stage 3 chronic kidney disease, without long-term current use of insulin (H)       blood glucose monitoring test strip    no brand specified    200 each    Use to test blood sugar 2 times daily, before breakfast and before bedtime    Type 2 diabetes mellitus with stage 3 chronic kidney disease, without long-term current use of insulin (H)       calcitRIOL 0.5 MCG capsule    ROCALTROL    90 capsule    Take 1 capsule (0.5 mcg) by mouth daily    Postablative hypoparathyroidism (H)       ciprofloxacin-dexamethasone otic suspension    CIPRODEX    5.6 mL    Place 4 drops Into the left ear three times a week    Other infective chronic otitis externa of left ear, Tympanic membrane perforation, left, Sensorineural hearing loss (SNHL) of both ears       clotrimazole 1 % cream    LOTRIMIN     Apply topically 2 times daily as  needed Reported on 4/25/2017        ELIQUIS 2.5 MG tablet   Generic drug:  apixaban ANTICOAGULANT      Take 2.5 mg by mouth 2 times daily        estradiol 0.1 MG/GM cream    ESTRACE VAGINAL    42.5 g    Place 2 g vaginally twice a week    Atrophic vaginitis       ezetimibe 10 MG tablet    ZETIA    90 tablet    TAKE 1 TABLET BY MOUTH EVERY DAY OR AS DIRECTED BY DR WOLF    Hyperlipidemia LDL goal <70, Benign essential hypertension       ferrous gluconate 324 (38 Fe) MG tablet    FERGON    90 tablet    Take 1 tablet (324 mg) by mouth 3 times daily (with meals)    Liver replaced by transplant (H)       folic acid 1 MG tablet    FOLVITE    100 tablet    Take 1 tablet (1 mg) by mouth daily    Liver replaced by transplant (H)       furosemide 20 MG tablet    LASIX    45 tablet    TAKE 1/2 TABLET BY MOUTH EVERY DAY    CKD (chronic kidney disease) stage 3, GFR 30-59 ml/min, Liver replaced by transplant (H)       hydrALAZINE 25 MG tablet    APRESOLINE    270 tablet    Take 0.5 tablets (12.5 mg) by mouth 3 times daily as needed , if systolic blood pressure is more than 140 mmHg.    HTN (hypertension)       ketoconazole 2 % cream    NIZORAL    15 g    Apply topically 2 times daily To angles of mouth    Angular cheilitis       LOVAZA 1 g capsule   Generic drug:  omega-3 acid ethyl esters     360 capsule    Take 1 capsule (1 g) by mouth 2 times daily    Hypertriglyceridemia       meclizine 25 MG tablet    ANTIVERT    30 tablet    Take 1 tablet (25 mg) by mouth as needed    Dizziness       metoprolol succinate 50 MG 24 hr tablet    TOPROL-XL    90 tablet    TAKE 1 TABLET (50 MG) BY MOUTH DAILY    Paroxysmal atrial fibrillation (H)       neomycin-polymyxin-hydrocortisone 3.5-62883-0 otic suspension    CORTISPORIN    10 mL    Place 4 drops Into the left ear 4 times daily    Acute swimmer's ear of left side       nitroFURantoin (macrocrystal-monohydrate) 100 MG capsule    MACROBID    14 capsule    Take 1 capsule (100 mg) by mouth 2  times daily For one week at onset of UTI symptoms    Urinary tract infection without hematuria, site unspecified       predniSONE 5 MG tablet    DELTASONE    90 tablet    Take 1 tablet (5 mg) by mouth daily    Thyroid cancer (H), Liver replaced by transplant (H)       PRESERVISION AREDS 2 Caps      Take 1 capsule by mouth 2 times daily        sertraline 50 MG tablet    ZOLOFT    90 tablet    Take 1 tablet (50 mg) by mouth daily    Adjustment disorder with depressed mood       sirolimus 1 MG Tabs tablet     45 tablet    Take 1 tablet (1 mg) by mouth every other day    Liver replaced by transplant (H)       SUMAtriptan 50 MG tablet    IMITREX    30 tablet    Take 1 tablet (50 mg) by mouth at onset of headache for migraine repeat after 2 hours if needed.    Migraine without aura and without status migrainosus, not intractable       * SYNTHROID 150 MCG tablet   Generic drug:  levothyroxine      Take 225 mcg by mouth on Mondays        * levothyroxine 150 MCG tablet    SYNTHROID/LEVOTHROID    102 tablet    TAKE 1.5 TABLETS BY MOUTH ON MONDAYS. AND 1 TABLET DAILY, THE OTHER DAYS OF THE WEEK.    Malignant neoplasm of thyroid gland (H)       triamcinolone 0.1 % cream    KENALOG     Apply topically 2 times daily as needed for irritation        ursodiol 250 MG tablet    ACTIGALL    180 tablet    Take 1 tablet (250 mg) by mouth 2 times daily    Liver replaced by transplant (H)       voriconazole 200 MG tablet    VFEND    60 tablet    Take 1 tablet (200 mg) by mouth 2 times daily    Coccidioidal pneumonitis (H)       * Notice:  This list has 2 medication(s) that are the same as other medications prescribed for you. Read the directions carefully, and ask your doctor or other care provider to review them with you.

## 2018-09-22 NOTE — PROGRESS NOTES
Dear [place]    Your test results are attached. I am happy to let you know that they are stable.    The radiologist did not see any compression fracture or dislocation. You most likely have pain from a sprain in that area of the ribs and back. Let me know if the pain is not getting better in the next few weeks.     Please contact me by [email or web address] if you have any questions about your labs or management.    Jennifer Barraza MD

## 2018-09-24 ENCOUNTER — MYC MEDICAL ADVICE (OUTPATIENT)
Dept: INFECTIOUS DISEASES | Facility: CLINIC | Age: 68
End: 2018-09-24

## 2018-09-24 ENCOUNTER — MYC MEDICAL ADVICE (OUTPATIENT)
Dept: PSYCHIATRY | Facility: CLINIC | Age: 68
End: 2018-09-24

## 2018-09-24 DIAGNOSIS — B38.2 COCCIDIOIDAL PNEUMONITIS (H): ICD-10-CM

## 2018-09-24 DIAGNOSIS — F43.21 ADJUSTMENT DISORDER WITH DEPRESSED MOOD: ICD-10-CM

## 2018-09-24 RX ORDER — VORICONAZOLE 200 MG/1
TABLET, FILM COATED ORAL
Qty: 90 TABLET | Refills: 3 | Status: SHIPPED | OUTPATIENT
Start: 2018-09-24 | End: 2019-03-05

## 2018-09-27 ENCOUNTER — OFFICE VISIT (OUTPATIENT)
Dept: FAMILY MEDICINE | Facility: CLINIC | Age: 68
End: 2018-09-27
Payer: MEDICARE

## 2018-09-27 VITALS
SYSTOLIC BLOOD PRESSURE: 126 MMHG | DIASTOLIC BLOOD PRESSURE: 72 MMHG | OXYGEN SATURATION: 96 % | HEART RATE: 70 BPM | RESPIRATION RATE: 16 BRPM | TEMPERATURE: 99 F

## 2018-09-27 DIAGNOSIS — Z13.89 SCREENING FOR DIABETIC PERIPHERAL NEUROPATHY: ICD-10-CM

## 2018-09-27 DIAGNOSIS — Z78.0 ASYMPTOMATIC POSTMENOPAUSAL STATUS: ICD-10-CM

## 2018-09-27 DIAGNOSIS — Z23 NEED FOR PROPHYLACTIC VACCINATION AND INOCULATION AGAINST INFLUENZA: ICD-10-CM

## 2018-09-27 PROCEDURE — 87186 SC STD MICRODIL/AGAR DIL: CPT | Performed by: INTERNAL MEDICINE

## 2018-09-27 PROCEDURE — 99213 OFFICE O/P EST LOW 20 MIN: CPT | Performed by: INTERNAL MEDICINE

## 2018-09-27 PROCEDURE — 87077 CULTURE AEROBIC IDENTIFY: CPT | Performed by: INTERNAL MEDICINE

## 2018-09-27 PROCEDURE — 87070 CULTURE OTHR SPECIMN AEROBIC: CPT | Performed by: INTERNAL MEDICINE

## 2018-09-27 RX ORDER — CLINDAMYCIN HCL 300 MG
300 CAPSULE ORAL 3 TIMES DAILY
Qty: 30 CAPSULE | Refills: 0 | Status: SHIPPED | OUTPATIENT
Start: 2018-09-27 | End: 2019-05-15

## 2018-09-27 RX ORDER — SERTRALINE HYDROCHLORIDE 100 MG/1
TABLET, FILM COATED ORAL
Qty: 90 TABLET | Refills: 1 | Status: SHIPPED | OUTPATIENT
Start: 2018-09-27 | End: 2019-04-02

## 2018-09-27 ASSESSMENT — PAIN SCALES - GENERAL: PAINLEVEL: SEVERE PAIN (6)

## 2018-09-27 NOTE — MR AVS SNAPSHOT
After Visit Summary   9/27/2018    Luz Thompson    MRN: [id number]           Patient Information     Date Of Birth          1950        Visit Information        Provider Department      9/27/2018 6:30 PM Daniel Clay MD [facility]        Today's Diagnoses     Wound abscess, initial encounter    -  1    Asymptomatic postmenopausal status        Screening for diabetic peripheral neuropathy        Need for prophylactic vaccination and inoculation against influenza          Care Instructions    [facility]    If you have any questions regarding to your visit please contact your care team:     Team Pink:   Clinic Hours Telephone Number   Internal Medicine:  Dr. Faviola Leger NP 7am-7pm  Monday - Thursday   7am-5pm  Fridays  [phone]  (Appointment scheduling available 24/7)   Urgent Care - [place] and [facility] - 11am-9pm Monday-Friday Saturday-Sunday- 9am-5pm   [place] - 5pm-9pm Monday-Friday Saturday-Sunday- 9am-5pm  [phone] - [place]  [phone] - [place]       What options do I have for a visit other than an office visit? We offer electronic visits (e-visits) and telephone visits, when medically appropriate.  Please check with your medical insurance to see if these types of visits are covered, as you will be responsible for any charges that are not paid by your insurance.      You can use [employer] (secure electronic communication) to access to your chart, send your primary care provider a message, or make an appointment. Ask a team member how to get started.     For a price quote for your services, please call our Consumer Price Line at [phone] or our Imaging Cost estimation line at [phone] (for imaging tests).    Leatha            Follow-ups after your visit        Your next 10 appointments already scheduled     Oct 08, 2018  1:15 PM CDT   Return Visit with Randell Mejia MD    [facility] ([facility])    [address] [address]   [phone]            May 14, 2019 12:20 PM CDT   CT CHEST W/O CONTRAST with UCCT2   [facility] ([facility])    [address]  1st Floor  [place] [address]   [phone]           How do I prepare for my exam? (Food and drink instructions) No Food and Drink Restrictions.  How do I prepare for my exam? (Other instructions) You do not need to do anything special to prepare for this exam. For a sinus scan: Use your nose spray (nasal decongestant spray) as directed.  What should I wear: Please wear loose clothing, such as a sweat suit or jogging clothes. Avoid snaps, zippers and other metal. We may ask you to undress and put on a hospital gown.  How long does the exam take: Most scans take less than 20 minutes.  What should I bring: Please bring any scans or X-rays taken at other hospitals, if similar tests were done. Also bring a list of your medicines, including vitamins, minerals and over-the-counter drugs. It is safest to leave personal items at home.  Do I need a : No  is needed.  What do I need to tell my doctor? Be sure to tell your doctor: * If you have any allergies. * If there s any chance you are pregnant. * If you are breastfeeding.  What should I do after the exam: No restrictions, You may resume normal activities.  What is this test: A CT (computed tomography) scan is a series of pictures that allows us to look inside your body. The scanner creates images of the body in cross sections, much like slices of bread. This helps us see any problems more clearly.  Who should I call with questions: If you have any questions, please call the Imaging Department where you will have your exam. Directions, parking instructions, and other information is available on our website, [email or web address].            May 14, 2019 12:40 PM CDT    (Arrive by 12:25 PM)   Return Visit with LIZ Bush   [facility] ([facility])    [address]  [address]  [place] [id number] [phone]            May 15, 2019 10:30 AM CDT   (Arrive by 10:15 AM)   Return Visit with Crystal Montero MD   [facility] ([facility])    [address]  [address]  [place] [id number] [phone]            May 22, 2019  1:30 PM CDT   Diabetes Education with Trisha Timmons RD   [facility],  Diabetes Education ([facility])    [facility] [address]  [address]  [place] [id number] [phone]              Future tests that were ordered for you today     Open Future Orders        Priority Expected Expires Ordered    Wound Culture Aerobic Bacterial Routine  9/27/2019 9/27/2018            Who to contact     If you have questions or need follow up information about today's clinic visit or your schedule please contact [facility] directly at [phone].  Normal or non-critical lab and imaging results will be communicated to you by MyChart, letter or phone within 4 business days after the clinic has received the results. If you do not hear from us within 7 days, please contact the clinic through MyChart or phone. If you have a critical or abnormal lab result, we will notify you by phone as soon as possible.  Submit refill requests through [employer] or call your pharmacy and they will forward the refill request to us. Please allow 3 business days for your refill to be completed.          Additional Information About Your Visit        [employer] Information     [employer] gives you secure access to your electronic health record. If you see a primary care provider, you can also send messages to your care team and make appointments. If you have questions, please call  your primary care clinic.  If you do not have a primary care provider, please call [phone] and they will assist you.        Care EveryWhere ID     This is your Care EveryWhere ID. This could be used by other organizations to access your [place] medical records  [id number]        Your Vitals Were     Pulse Temperature Respirations Last Period Pulse Oximetry       70 99  F (37.2  C) (Oral) 16 06/01/1988 (Approximate) 96%        Blood Pressure from Last 3 Encounters:   09/27/18 126/72   09/21/18 126/73   09/12/18 126/65    Weight from Last 3 Encounters:   09/21/18 173 lb 6.4 oz (78.7 kg)   09/12/18 173 lb 5 oz (78.6 kg)   08/27/18 175 lb (79.4 kg)                 Today's Medication Changes          These changes are accurate as of 9/27/18  7:15 PM.  If you have any questions, ask your nurse or doctor.               Start taking these medicines.        Dose/Directions    clindamycin 300 MG capsule   Commonly known as:  CLEOCIN   Used for:  Wound abscess, initial encounter   Started by:  Daniel Clay MD        Dose:  300 mg   Take 1 capsule (300 mg) by mouth 3 times daily   Quantity:  30 capsule   Refills:  0            Where to get your medicines      Some of these will need a paper prescription and others can be bought over the counter.  Ask your nurse if you have questions.     Bring a paper prescription for each of these medications     clindamycin 300 MG capsule                Primary Care Provider Office Phone # Fax #    Jennifer Amparo Barraza [phone]       [address]  [facility] [address]        Goals        Diet    Have 3 meals a day     Notes - Note created  8/22/2018  1:18 PM by Trisha Timmons RD    Goal Statement: Have three meals per day, use the plate method to include 30-60 grams of carbohydrate at meals and 15-30 grams of carbohydrate at snacks. Include a lean source of protein, heart healthy fat and vegetables at meals.  Measure of Success: using above method majority  of the time  Supportive Steps to Achieve: Plan ahead, meal plan, support from friend whom she lives with, read labels  Barriers: none  Strengths: motivated   Patient expressed understanding of goal: yes, verbalized             General    Being Active (pt-stated)     Notes - Note created  8/22/2018  1:21 PM by Trisha Timmons RD    Continue working with a  twice per week and walk on other days for a goal of 30 minutes.      Healthy Eating (pt-stated)     Notes - Note created  8/22/2018  1:20 PM by Trisha Timmons RD    Goal Statement: choose high fiber carbohydrate most of the time  Supportive Steps to Achieve: read labels and meal plan  Strengths: motivated and demonstrates understanding              Equal Access to Services     CARLA NOWAK : Hadkaylee Ma, watristanda rowenaadaha, dian kaalmada chata, etat hardy. So [facility] [phone].    ATENCIÓN: Si habla español, tiene a jason disposición servicios gratuitos de asistencia lingüística. Llame al [phone].    We comply with applicable federal civil rights laws and [place] laws. We do not discriminate on the basis of race, color, national origin, age, disability, sex, sexual orientation, or gender identity.            Thank you!     [facility] you [facility]  [facility]. Our goal is always to provide you with excellent care. Hearing back from our patients is one way we can continue to improve our services. Please take a few minutes to complete the written survey that you may receive in the mail after your visit with us. Thank you!             Your Updated Medication List - Protect others around you: Learn how to safely use, store and throw away your medicines at www.disposemymeds.org.          This list is accurate as of 9/27/18  7:15 PM.  Always use your most recent med list.                   Brand Name Dispense Instructions for use Diagnosis    acetaminophen 500 MG Caps      Take  1,000 mg by mouth nightly as needed Take 500-1,000 mg by mouth every 6 hours if needed.        ARTIFICIAL TEARS OP      Apply 1 drop to eye as needed        BENEFIBER Powd      2 teaspoons daily        blood glucose monitoring meter device kit    no brand specified    1 kit    Use to test blood sugar 2times daily or as directed.    Type 2 diabetes mellitus with stage 3 chronic kidney disease, without long-term current use of insulin (H)       blood glucose monitoring test strip    no brand specified    200 each    Use to test blood sugar 2 times daily, before breakfast and before bedtime    Type 2 diabetes mellitus with stage 3 chronic kidney disease, without long-term current use of insulin (H)       calcitRIOL 0.5 MCG capsule    ROCALTROL    90 capsule    Take 1 capsule (0.5 mcg) by mouth daily    Postablative hypoparathyroidism (H)       ciprofloxacin-dexamethasone otic suspension    CIPRODEX    5.6 mL    Place 4 drops Into the left ear three times a week    Other infective chronic otitis externa of left ear, Tympanic membrane perforation, left, Sensorineural hearing loss (SNHL) of both ears       clindamycin 300 MG capsule    CLEOCIN    30 capsule    Take 1 capsule (300 mg) by mouth 3 times daily    Wound abscess, initial encounter       clotrimazole 1 % cream    LOTRIMIN     Apply topically 2 times daily as needed Reported on 4/25/2017        ELIQUIS 2.5 MG tablet   Generic drug:  apixaban ANTICOAGULANT      Take 2.5 mg by mouth 2 times daily        estradiol 0.1 MG/GM cream    ESTRACE VAGINAL    42.5 g    Place 2 g vaginally twice a week    Atrophic vaginitis       ezetimibe 10 MG tablet    ZETIA    90 tablet    TAKE 1 TABLET BY MOUTH EVERY DAY OR AS DIRECTED BY DR WOLF    Hyperlipidemia LDL goal <70, Benign essential hypertension       ferrous gluconate 324 (38 Fe) MG tablet    FERGON    90 tablet    Take 1 tablet (324 mg) by mouth 3 times daily (with meals)    Liver replaced by transplant (H)       folic  acid 1 MG tablet    FOLVITE    100 tablet    Take 1 tablet (1 mg) by mouth daily    Liver replaced by transplant (H)       furosemide 20 MG tablet    LASIX    45 tablet    TAKE 1/2 TABLET BY MOUTH EVERY DAY    CKD (chronic kidney disease) stage 3, GFR 30-59 ml/min, Liver replaced by transplant (H)       hydrALAZINE 25 MG tablet    APRESOLINE    270 tablet    Take 0.5 tablets (12.5 mg) by mouth 3 times daily as needed , if systolic blood pressure is more than 140 mmHg.    HTN (hypertension)       ketoconazole 2 % cream    NIZORAL    15 g    Apply topically 2 times daily To angles of mouth    Angular cheilitis       LOVAZA 1 g capsule   Generic drug:  omega-3 acid ethyl esters     360 capsule    Take 1 capsule (1 g) by mouth 2 times daily    Hypertriglyceridemia       meclizine 25 MG tablet    ANTIVERT    30 tablet    Take 1 tablet (25 mg) by mouth as needed    Dizziness       metoprolol succinate 50 MG 24 hr tablet    TOPROL-XL    90 tablet    TAKE 1 TABLET (50 MG) BY MOUTH DAILY    Paroxysmal atrial fibrillation (H)       neomycin-polymyxin-hydrocortisone 3.5-90688-3 otic suspension    CORTISPORIN    10 mL    Place 4 drops Into the left ear 4 times daily    Acute swimmer's ear of left side       nitroFURantoin (macrocrystal-monohydrate) 100 MG capsule    MACROBID    14 capsule    Take 1 capsule (100 mg) by mouth 2 times daily For one week at onset of UTI symptoms    Urinary tract infection without hematuria, site unspecified       predniSONE 5 MG tablet    DELTASONE    90 tablet    Take 1 tablet (5 mg) by mouth daily    Thyroid cancer (H), Liver replaced by transplant (H)       PRESERVISION AREDS 2 Caps      Take 1 capsule by mouth 2 times daily        sertraline 100 MG tablet    ZOLOFT    90 tablet    Take 1 tab (100mg) PO daily    Adjustment disorder with depressed mood       sirolimus 1 MG Tabs tablet     45 tablet    Take 1 tablet (1 mg) by mouth every other day    Liver replaced by transplant (H)        SUMAtriptan 50 MG tablet    IMITREX    30 tablet    Take 1 tablet (50 mg) by mouth at onset of headache for migraine repeat after 2 hours if needed.    Migraine without aura and without status migrainosus, not intractable       * SYNTHROID 150 MCG tablet   Generic drug:  levothyroxine      Take 225 mcg by mouth on Mondays        * levothyroxine 150 MCG tablet    SYNTHROID/LEVOTHROID    102 tablet    TAKE 1.5 TABLETS BY MOUTH ON MONDAYS. AND 1 TABLET DAILY, THE OTHER DAYS OF THE WEEK.    Malignant neoplasm of thyroid gland (H)       triamcinolone 0.1 % cream    KENALOG     Apply topically 2 times daily as needed for irritation        ursodiol 250 MG tablet    ACTIGALL    180 tablet    Take 1 tablet (250 mg) by mouth 2 times daily    Liver replaced by transplant (H)       voriconazole 200 MG tablet    VFEND    90 tablet    TAKE ONE TABLET BY MOUTH TWICE DAILY    Coccidioidal pneumonitis (H)       * Notice:  This list has 2 medication(s) that are the same as other medications prescribed for you. Read the directions carefully, and ask your doctor or other care provider to review them with you.

## 2018-09-27 NOTE — TELEPHONE ENCOUNTER
-Obtained VORB from Luzma Mercer to fill Zoloft 100mg tabs, quantity #90, 1 refill.    -Order submitted to pharmacy.

## 2018-09-27 NOTE — TELEPHONE ENCOUNTER
-Routing call to provider to determine number of refills to provide on Zoloft 100mg refill. Pt last saw Luzma on 7/10/18, and RTC is about a year.

## 2018-09-27 NOTE — PROGRESS NOTES
SUBJECTIVE:   Luz Thompson is a 68 year old female who presents to clinic today for the following health issues:  Chief Complaint   Patient presents with     Musculoskeletal Problem     infection on right hand fall 2 weeks ago. pt noted she noticed the infected hand 1 week ago     Health Maintenance     Foot exam, DEXA ADP Microalbumin, influ         Joint Pain    Onset: x about 5 days ago    Description:   Location: right hand  Character: Stabbing    Intensity: 6/10    Progression of Symptoms: same    Accompanying Signs & Symptoms:  Other symptoms: tingling    History:   Previous similar pain: no       Precipitating factors:   Trauma or overuse: YES- fall    Alleviating factors:  Improved by: Over the counter location. Pt unsure of name     Therapies Tried and outcome: Over the counter location. Pt unsure of name - probably neosporin triple antibiotic cream.    The back drop to all of this is that this is a very complicated patient with many different issues . She has a liver transplant and is on anti-rejection medications.shes had a cerebrovascular accident and a whole host of medical issues that we only briefly reviewed . This is not relevant to today's problem. Patient seems interested to possibly follow up with me for a later primary care appointment but this only a possibility . For today she is concerned because the lesion on her right hand, just outside the snuffbox began as a bump and has steadily increased in size and has become painful and developed a reddened halo with some central ulcerated look with some adherent mucopurulent discharge although this actually seemed dry today. This lump looks infected to me.pt recent ly sustained a terrible all with a lot of bruising and she is on Apixaban (Eliquis) because of her cerebrovascular accident history. For further details see care everywhere       Problem list and histories reviewed & adjusted, as indicated.  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     Prophylactic antibiotic     High risk medication use     Statin intolerance     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     SNHL (sensorineural hearing loss)     Abnormal liver function tests     Liver replaced by transplant (H)     Type 2 diabetes, HbA1c goal < 7% (H)     Hyperlipidemia LDL goal <70     Hypertension goal BP (blood pressure) < 140/80     Postoperative hypothyroidism     Type 2 diabetes mellitus with stage 3 chronic kidney disease, without long-term current use of insulin (H)     Age-related osteoporosis without current pathological fracture     Perforation of tympanic membrane, left     Other infective chronic otitis externa of left ear     CKD (chronic kidney disease) stage 3, GFR 30-59 ml/min     Past Surgical History:   Procedure Laterality Date     APPENDECTOMY  1961     BIOPSY       CATARACT IOL, RT/LT      RE12/19/2013, LE12/10/2013 - Toric lenses     CHOLECYSTECTOMY  1991     COLONOSCOPY  3/10/2014    Procedure: COLONOSCOPY;;  Surgeon: Gentry Ramirez MD;  Location: [facility]     ear drum repair       ENDOBRONCHIAL ULTRASOUND FLEXIBLE N/A 9/29/2017    Procedure: ENDOBRONCHIAL ULTRASOUND FLEXIBLE;  Flexible Bronchoscopy, Endobronchial Ultrasound, Transbronchial Needle Aspiration ;  Surgeon: Eden Clinton MD;  Location: [facility]     ENDOSCOPIC RETROGRADE CHOLANGIOPANCREATOGRAM  9/19/2013    Procedure: ENDOSCOPIC RETROGRADE CHOLANGIOPANCREATOGRAM;  Endoscopic Retrograde Cholangiopancreatogram with single balloon enteroscopy, ballon sweep of bile duct;  Surgeon: Brett Membreno MD;  [facility]: [facility]      KNEE SCOPE,MED/LAT MENISECTOMY  8/10/12    Right, partial medial menisectomy only     KNEE SURGERY  1966    R knee     PICC INSERTION  9/18/2013    4fr SL  "PASV PICC, 40cm (1cm external) in the R basilic vein w/ tip in the low SVC     PICC INSERTION  2014    5 fr DL BioFlo Navilyst PICC, 46 cm (3 cm external) in the L basilic vein w/ tip in the SVC RA junction.     THYROIDECTOMY  3/2010     TRANSPLANT LIVER RECIPIENT LIVING UNRELATED         Social History   Substance Use Topics     Smoking status: Former Smoker     Packs/day: 1.00     Years: 18.00     Types: Cigarettes     Quit date: 1985     Smokeless tobacco: Never Used     Alcohol use 0.0 oz/week     0 Standard drinks or equivalent per week      Comment: rare - \"I toast at weddings\"     Family History   Problem Relation Age of Onset     Hypertension Mother      Endometrial Cancer Mother      Hyperlipidemia Mother      Prostate Cancer Father      Macular Degeneration Father      Cancer - colorectal Maternal Grandmother      in her 80's, has surgery and removal of part of kidney,  at age 98     HEART DISEASE Maternal Grandfather       at 98     Glaucoma Maternal Grandfather      Cerebrovascular Disease Paternal Grandmother      in her 80's     Hypertension Paternal Grandmother      HEART DISEASE Paternal Grandfather      MI     Alzheimer Disease Paternal Grandfather      Allergies Son      Neurologic Disorder Daughter      Migraines     Breast Cancer Other      Anesthesia Reaction No family hx of      Crohn Disease No family hx of      Ulcerative Colitis No family hx of          Current Outpatient Prescriptions   Medication Sig Dispense Refill     acetaminophen 500 MG CAPS Take 1,000 mg by mouth nightly as needed Take 500-1,000 mg by mouth every 6 hours if needed.        blood glucose monitoring (NO BRAND SPECIFIED) meter device kit Use to test blood sugar 2times daily or as directed. 1 kit 0     blood glucose monitoring (NO BRAND SPECIFIED) test strip Use to test blood sugar 2 times daily, before breakfast and before bedtime 200 each 3     calcitRIOL (ROCALTROL) 0.5 MCG capsule Take 1 " capsule (0.5 mcg) by mouth daily 90 capsule 3     ciprofloxacin-dexamethasone (CIPRODEX) otic suspension Place 4 drops Into the left ear three times a week 5.6 mL 6     clindamycin (CLEOCIN) 300 MG capsule Take 1 capsule (300 mg) by mouth 3 times daily 30 capsule 0     clotrimazole (LOTRIMIN) 1 % cream Apply topically 2 times daily as needed Reported on 4/25/2017       ELIQUIS 2.5 MG tablet Take 2.5 mg by mouth 2 times daily        estradiol (ESTRACE VAGINAL) 0.1 MG/GM cream Place 2 g vaginally twice a week 42.5 g 11     ezetimibe (ZETIA) 10 MG tablet TAKE 1 TABLET BY MOUTH EVERY DAY OR AS DIRECTED BY DR WOLF 90 tablet 3     ferrous gluconate (FERGON) 324 (38 Fe) MG tablet Take 1 tablet (324 mg) by mouth 3 times daily (with meals) 90 tablet 0     folic acid (FOLVITE) 1 MG tablet Take 1 tablet (1 mg) by mouth daily 100 tablet 3     furosemide (LASIX) 20 MG tablet TAKE 1/2 TABLET BY MOUTH EVERY DAY 45 tablet 3     hydrALAZINE (APRESOLINE) 25 MG tablet Take 0.5 tablets (12.5 mg) by mouth 3 times daily as needed , if systolic blood pressure is more than 140 mmHg. 270 tablet 3     Hypromellose (ARTIFICIAL TEARS OP) Apply 1 drop to eye as needed       ketoconazole (NIZORAL) 2 % cream Apply topically 2 times daily To angles of mouth 15 g 0     levothyroxine (SYNTHROID) 150 MCG tablet Take 225 mcg by mouth on Mondays       levothyroxine (SYNTHROID/LEVOTHROID) 150 MCG tablet TAKE 1.5 TABLETS BY MOUTH ON MONDAYS. AND 1 TABLET DAILY, THE OTHER DAYS OF THE WEEK. 102 tablet 3     meclizine (ANTIVERT) 25 MG tablet Take 1 tablet (25 mg) by mouth as needed 30 tablet 11     metoprolol succinate (TOPROL-XL) 50 MG 24 hr tablet TAKE 1 TABLET (50 MG) BY MOUTH DAILY 90 tablet 3     Multiple Vitamins-Minerals (PRESERVISION AREDS 2) CAPS Take 1 capsule by mouth 2 times daily       neomycin-polymyxin-hydrocortisone (CORTISPORIN) 3.5-13219-7 otic suspension Place 4 drops Into the left ear 4 times daily 10 mL 0     nitroFURantoin,  macrocrystal-monohydrate, (MACROBID) 100 MG capsule Take 1 capsule (100 mg) by mouth 2 times daily For one week at onset of UTI symptoms 14 capsule 6     omega-3 acid ethyl esters (LOVAZA) 1 g capsule Take 1 capsule (1 g) by mouth 2 times daily 360 capsule 3     predniSONE (DELTASONE) 5 MG tablet Take 1 tablet (5 mg) by mouth daily 90 tablet 3     RAPAMUNE (BRAND) 1 MG PO TABLET Take 1 tablet (1 mg) by mouth every other day 45 tablet 3     sertraline (ZOLOFT) 100 MG tablet Take 1 tab (100mg) PO daily 90 tablet 1     SUMAtriptan (IMITREX) 50 MG tablet Take 1 tablet (50 mg) by mouth at onset of headache for migraine repeat after 2 hours if needed. 30 tablet 3     triamcinolone (KENALOG) 0.1 % cream Apply topically 2 times daily as needed for irritation       ursodiol (ACTIGALL) 250 MG tablet Take 1 tablet (250 mg) by mouth 2 times daily 180 tablet 3     voriconazole (VFEND) 200 MG tablet TAKE ONE TABLET BY MOUTH TWICE DAILY  90 tablet 3     Wheat Dextrin (BENEFIBER) POWD 2 teaspoons daily       [DISCONTINUED] sertraline (ZOLOFT) 50 MG tablet Take 1 tablet (50 mg) by mouth daily (Patient taking differently: Take 100 mg by mouth daily ) 90 tablet 3     Allergies   Allergen Reactions     Fluconazole Hives and Itching     Ciprofloxacin Anxiety and Other (See Comments)     Irregular heart beat     Azithromycin Itching     Benadryl [Diphenhydramine Hcl]      Insomnia      Cellcept Diarrhea     Ciprofloxacin Other (See Comments)     Insomnia, mood lability     Codeine      Psych disturbance     Codeine      Diphenhydramine Other (See Comments)     Doxycycline      Lansoprazole Diarrhea     Levaquin [Levofloxacin] Other (See Comments)     Headache, hyperactivity     Lisinopril Cough     Methotrexate      Sores     Methotrexate      Morphine Sulfate Itching     Mycophenolate Diarrhea     No Clinical Screening - See Comments      Simvastatin Muscle Pain (Myalgia)     severe     Cephalexin Rash     Fever and skin burning      Penicillin G Itching and Rash     Tolectin [Nsaids] Rash     Tramadol Rash     Recent Labs   Lab Test  07/30/18   0913  07/30/18   0855  05/19/18   0629  05/18/18   0731  05/14/18   1019  04/17/18   0942   10/05/17   0907   08/22/17   0905  07/13/17   0843   A1C   --    --    --   6.8*   --    --    --    --    --    --    --    LDL   --    --    --   Cannot estimate LDL when triglyceride exceeds 400 mg/dL  150*   --    --    --   Cannot estimate LDL when triglyceride exceeds 400 mg/dL  135*   --   Cannot estimate LDL when triglyceride exceeds 400 mg/dL  174*   --    HDL   --    --    --   49*   --    --    --   50   --   49*   --    TRIG   --    --    --   557*   --    --    --   419*   --   447*   --    ALT  37   --    --    --   43  42   < >  33   < >  107*  105*   CR  1.11*   --   1.29*  1.22*  1.40*  1.74*   --   1.38*   < >  1.29*  1.30*   GFRESTIMATED  49*   --   41*  44*  37*  29*   --   38*   < >  41*  41*   GFRESTBLACK  59*   --   50*  53*  45*  35*   --   46*   < >  50*  49*   POTASSIUM  3.3*   --   4.1  3.6  3.3*  3.9   --   4.0   < >  3.6  3.6   TSH   --   8.64*   --    --    --    --    --    --    --    --   3.66    < > = values in this interval not displayed.      BP Readings from Last 3 Encounters:   09/27/18 126/72   09/21/18 126/73   09/12/18 126/65    Wt Readings from Last 3 Encounters:   09/21/18 173 lb 6.4 oz (78.7 kg)   09/12/18 173 lb 5 oz (78.6 kg)   08/27/18 175 lb (79.4 kg)                  Labs reviewed in EPIC    Reviewed and updated as needed this visit by clinical staff  Tobacco  Allergies  Meds  Med Hx  Surg Hx  Fam Hx  Soc Hx      Reviewed and updated as needed this visit by Provider         ROS:  Constitutional, HEENT, cardiovascular, pulmonary, gi and gu systems are negative, except as otherwise noted.    OBJECTIVE:                                                    /72  Pulse 70  Temp 99  F (37.2  C) (Oral)  Resp 16  LMP 06/01/1988 (Approximate)  SpO2  96%  There is no height or weight on file to calculate BMI.  GENERAL APPEARANCE: healthy, alert and no distress  EYES: Eyes grossly normal to inspection, PERRL and conjunctivae and sclerae normal  MS: extremities normal- no gross deformities noted, exam as detailed above   SKIN: as detailed above     Diagnostic test results:  Diagnostic Test Results:  No orders of the defined types were placed in this encounter.         ASSESSMENT/PLAN:                                                    1. Wound abscess, initial encounter  My suspicion is that there is infection present and in consideration of patients immune compromised state I think antibiotics treatment is appropriate. If things continue to evolve in a bad direction despite antibiotics she may need an eventual incision and drainage procedure . She will contact back the office if her symptoms are worsened as detailed above . She will dress the wound with topical bacitracin  And change dressing 1 time per day   - Wound Culture Aerobic Bacterial; Future  - clindamycin (CLEOCIN) 300 MG capsule; Take 1 capsule (300 mg) by mouth 3 times daily  Dispense: 30 capsule; Refill: 0  - Wound Culture Aerobic Bacterial    2. Asymptomatic postmenopausal status      3. Screening for diabetic peripheral neuropathy      4. Need for prophylactic vaccination and inoculation against influenza        Follow up with Provider - depending on how things go      Daniel Clay MD  [facility]

## 2018-09-28 NOTE — PATIENT INSTRUCTIONS
[facility]    If you have any questions regarding to your visit please contact your care team:     Team Pink:   Clinic Hours Telephone Number   Internal Medicine:  Dr. Faviola Leger NP 7am-7pm  Monday - Thursday   7am-5pm  Fridays  [phone]  (Appointment scheduling available 24/7)   Urgent Care - [place] and [facility] - 11am-9pm Monday-Friday Saturday-Sunday- 9am-5pm   [place] - 5pm-9pm Monday-Friday Saturday-Sunday- 9am-5pm  [phone] - [place]  [phone] - [place]       What options do I have for a visit other than an office visit? We offer electronic visits (e-visits) and telephone visits, when medically appropriate.  Please check with your medical insurance to see if these types of visits are covered, as you will be responsible for any charges that are not paid by your insurance.      You can use [employer] (secure electronic communication) to access to your chart, send your primary care provider a message, or make an appointment. Ask a team member how to get started.     For a price quote for your services, please call our Consumer Price Line at [phone] or our Imaging Cost estimation line at [phone] (for imaging tests).    Leatha

## 2018-10-01 ENCOUNTER — TELEPHONE (OUTPATIENT)
Dept: INTERNAL MEDICINE | Facility: CLINIC | Age: 68
End: 2018-10-01

## 2018-10-01 LAB
BACTERIA SPEC CULT: ABNORMAL
Lab: ABNORMAL
SPECIMEN SOURCE: ABNORMAL

## 2018-10-01 NOTE — TELEPHONE ENCOUNTER
Reason for call:  Patient reporting a symptom    Symptom or request: infection right hand     Duration (how long have symptoms been present): 2 weeks     Have you been treated for this before? Yes    Additional comments: Patient was  In to see Dr. Clay 09/27/18 and he said if it is not better he wanted to see her back in clinic today so he could nataliya it. There are no openings on his schedule until 10/05/18. Patient states he wanted to see her today. Please advise. Thank you    Phone Number patient can be reached at:  Home number on file [phone] (home)    Best Time:  ASAP    Can we leave a detailed message on this number:  YES    Call taken on 10/1/2018 at 11:13 AM by Alyson Glover

## 2018-10-01 NOTE — TELEPHONE ENCOUNTER
Called and spoke to patient and she reports her infection on R hand is somewhat better but still had ring around it with white pus.   Reports it looks better, had a small scab on it and it still somewhat red.   Pt denies fever or any other symptoms.   Pt is requesting to be seen today as she is flying out to [place], scheduled pt for tomorrow (10/2) as there were no openings today.     Routing to provider to advise if she should be seen today or if should be seen by specialist.    Padmini Brody RN

## 2018-10-01 NOTE — TELEPHONE ENCOUNTER
Huddled with Dr. Clay who reviewed wound culture results.   Staph a. Sensitive to Clindamycin prescribed previously  Per Dr. Clay pt should continue abx as prescribed.   At this time does not need lancing unless it gets worse which would require a general surgery referral.     Called and spoke to pt and gave her above information.   Verbalized understanding & no further questions.     Padmini Brody RN

## 2018-10-08 ENCOUNTER — OFFICE VISIT (OUTPATIENT)
Dept: OTOLARYNGOLOGY | Facility: CLINIC | Age: 68
End: 2018-10-08
Payer: MEDICARE

## 2018-10-08 VITALS
DIASTOLIC BLOOD PRESSURE: 64 MMHG | HEIGHT: 63 IN | OXYGEN SATURATION: 98 % | HEART RATE: 76 BPM | WEIGHT: 175 LBS | BODY MASS INDEX: 31.01 KG/M2 | RESPIRATION RATE: 12 BRPM | SYSTOLIC BLOOD PRESSURE: 144 MMHG

## 2018-10-08 DIAGNOSIS — H72.92 PERFORATION OF TYMPANIC MEMBRANE, LEFT: ICD-10-CM

## 2018-10-08 DIAGNOSIS — Z98.890 S/P TYMPANOPLASTY: ICD-10-CM

## 2018-10-08 DIAGNOSIS — H90.3 SENSORINEURAL HEARING LOSS (SNHL) OF BOTH EARS: Primary | ICD-10-CM

## 2018-10-08 PROCEDURE — 99214 OFFICE O/P EST MOD 30 MIN: CPT | Performed by: OTOLARYNGOLOGY

## 2018-10-08 NOTE — MR AVS SNAPSHOT
After Visit Summary   10/8/2018    Luz Thompson    MRN: [id number]           Patient Information     Date Of Birth          1950        Visit Information        Provider Department      10/8/2018 1:15 PM Randell Mejia MD [facility]        Today's Diagnoses     Sensorineural hearing loss (SNHL) of both ears    -  1    S/P tympanoplasty        Perforation of tympanic membrane, left          Care Instructions    Scheduling Information  To schedule your CT/MRI scan, please contact [facility] at [phone] OR [facility] at [phone]    To schedule your Surgery, please contact our Specialty Schedulers at [phone]      ENT Clinic Locations Clinic Hours Telephone Number     [facility]  [address]  [place] [address]   Monday:           1:00pm -- 5:00pm    Friday:              8:00am - 12:00pm   To schedule/reschedule an appointment with   Dr. Mejia,   please contact our   Specialty Scheduling Department at:     [phone]       [facility]  [address] [place] [address] Tuesday:          8:00am -- 2:00pm         Urgent Care Locations Clinic Hours Telephone Numbers     [facility]  [address] [place] [address]     Monday-Friday:     11:00am - 9:00pm    Saturday-Sunday:  9:00am - 5:00pm   [phone]     [facility]  [address] [place] [address]     Monday-Friday:      5:00pm - 9:00pm     Saturday-Sunday:  9:00am - 5:00pm   [phone]                 Follow-ups after your visit        Your next 10 appointments already scheduled     May 14, 2019 12:20 PM CDT   CT CHEST W/O CONTRAST with UCCT2   [facility] [place] [facility] ([facility])    [address] [address] [phone]           How do I prepare for my exam? (Food and drink instructions) No Food and Drink Restrictions.  How do I prepare for my exam? (Other  instructions) You do not need to do anything special to prepare for this exam. For a sinus scan: Use your nose spray (nasal decongestant spray) as directed.  What should I wear: Please wear loose clothing, such as a sweat suit or jogging clothes. Avoid snaps, zippers and other metal. We may ask you to undress and put on a hospital gown.  How long does the exam take: Most scans take less than 20 minutes.  What should I bring: Please bring any scans or X-rays taken at other hospitals, if similar tests were done. Also bring a list of your medicines, including vitamins, minerals and over-the-counter drugs. It is safest to leave personal items at home.  Do I need a : No  is needed.  What do I need to tell my doctor? Be sure to tell your doctor: * If you have any allergies. * If there s any chance you are pregnant. * If you are breastfeeding.  What should I do after the exam: No restrictions, You may resume normal activities.  What is this test: A CT (computed tomography) scan is a series of pictures that allows us to look inside your body. The scanner creates images of the body in cross sections, much like slices of bread. This helps us see any problems more clearly.  Who should I call with questions: If you have any questions, please call the Imaging Department where you will have your exam. Directions, parking instructions, and other information is available on our website, [email or web address].            May 14, 2019 12:40 PM CDT   (Arrive by 12:25 PM)   Return Visit with LIZ Bush   [facility] ([facility] [place])    [address] [facility]  [address]  [facility] [address] [phone]            May 15, 2019 10:30 AM CDT   (Arrive by 10:15 AM)   Return Visit with Crystal Montero MD   [facility] ([facility])    [address] [place] [address] [address]  [address]  [facility] [address]  "  [phone]            May 22, 2019  1:30 PM CDT   Diabetes Education with Trisha Timmons RD   [facility] [place],  Diabetes Education ([facility])    [facility] [address] [address] [phone]              Who to contact     If you have questions or need follow up information about today's clinic visit or your schedule please contact [facility] directly at [phone].  Normal or non-critical lab and imaging results will be communicated to you by [employer], letter or phone within 4 business days after the clinic has received the results. If you do not hear from us within 7 days, please contact the clinic through [employer] or phone. If you have a critical or abnormal lab result, we will notify you by phone as soon as possible.  Submit refill requests through [employer] or call your pharmacy and they will forward the refill request to us. Please allow 3 business days for your refill to be completed.          Additional Information About Your Visit        [employer] Information     [employer] gives you secure access to your electronic health record. If you see a primary care provider, you can also send messages to your care team and make appointments. If you have questions, please call your primary care clinic.  If you do not have a primary care provider, please call [phone] and they will assist you.        Care EveryWhere ID     This is your Care EveryWhere ID. This could be used by other organizations to access your [place] medical records  [id number]        Your Vitals Were     Pulse Respirations Height Last Period Pulse Oximetry BMI (Body Mass Index)    76 12 1.6 m (5' 3\") 06/01/1988 (Approximate) 98% 31 kg/m2       Blood Pressure from Last 3 Encounters:   10/08/18 144/64   09/27/18 126/72   09/21/18 126/73    Weight from Last 3 Encounters:   10/08/18 79.4 kg (175 lb)   09/21/18 78.7 kg (173 lb 6.4 oz)   09/12/18 78.6 kg (173 " lb 5 oz)              Today, you had the following     No orders found for display       Primary Care Provider Office Phone # Fax #    Jennifer Amparo Barraza [phone]       [address]  [facility] [address]        Goals        Diet    Have 3 meals a day     Notes - Note created  8/22/2018  1:18 PM by Trisha Timmons, AJ    Goal Statement: Have three meals per day, use the plate method to include 30-60 grams of carbohydrate at meals and 15-30 grams of carbohydrate at snacks. Include a lean source of protein, heart healthy fat and vegetables at meals.  Measure of Success: using above method majority of the time  Supportive Steps to Achieve: Plan ahead, meal plan, support from friend whom she lives with, read labels  Barriers: none  Strengths: motivated   Patient expressed understanding of goal: yes, verbalized             General    Being Active (pt-stated)     Notes - Note created  8/22/2018  1:21 PM by Trisha Timmons RD    Continue working with a  twice per week and walk on other days for a goal of 30 minutes.      Healthy Eating (pt-stated)     Notes - Note created  8/22/2018  1:20 PM by Trisha Timmons RD    Goal Statement: choose high fiber carbohydrate most of the time  Supportive Steps to Achieve: read labels and meal plan  Strengths: motivated and demonstrates understanding              Equal Access to Services     GENE NOWAK AH: Hadii brayan guo hadasho Soomaali, waaxda luqadaha, qaybta kaalmada adeegyada, etta hardy. So [facility] [phone].    ATENCIÓN: Si habla español, tiene a jason disposición servicios gratuitos de asistencia lingüística. tierra al [phone].    We comply with applicable federal civil rights laws and [place] laws. We do not discriminate on the basis of race, color, national origin, age, disability, sex, sexual orientation, or gender identity.            Thank you!     Thank you for choosing [facility]  for your  care. Our goal is always to provide you with excellent care. Hearing back from our patients is one way we can continue to improve our services. Please take a few minutes to complete the written survey that you may receive in the mail after your visit with us. Thank you!             Your Updated Medication List - Protect others around you: Learn how to safely use, store and throw away your medicines at www.disposemymeds.org.          This list is accurate as of 10/8/18  1:46 PM.  Always use your most recent med list.                   Brand Name Dispense Instructions for use Diagnosis    acetaminophen 500 MG Caps      Take 1,000 mg by mouth nightly as needed Take 500-1,000 mg by mouth every 6 hours if needed.        ARTIFICIAL TEARS OP      Apply 1 drop to eye as needed        BENEFIBER Powd      2 teaspoons daily        blood glucose monitoring meter device kit    no brand specified    1 kit    Use to test blood sugar 2times daily or as directed.    Type 2 diabetes mellitus with stage 3 chronic kidney disease, without long-term current use of insulin (H)       blood glucose monitoring test strip    no brand specified    200 each    Use to test blood sugar 2 times daily, before breakfast and before bedtime    Type 2 diabetes mellitus with stage 3 chronic kidney disease, without long-term current use of insulin (H)       calcitRIOL 0.5 MCG capsule    ROCALTROL    90 capsule    Take 1 capsule (0.5 mcg) by mouth daily    Postablative hypoparathyroidism       ciprofloxacin-dexamethasone otic suspension    CIPRODEX    5.6 mL    Place 4 drops Into the left ear three times a week    Other infective chronic otitis externa of left ear, Tympanic membrane perforation, left, Sensorineural hearing loss (SNHL) of both ears       clindamycin 300 MG capsule    CLEOCIN    30 capsule    Take 1 capsule (300 mg) by mouth 3 times daily    Wound abscess, initial encounter       clotrimazole 1 % cream    LOTRIMIN     Apply topically 2  times daily as needed Reported on 4/25/2017        ELIQUIS 2.5 MG tablet   Generic drug:  apixaban ANTICOAGULANT     180 tablet    Take 1 tablet (2.5 mg) by mouth 2 times daily    Paroxysmal atrial fibrillation (H)       estradiol 0.1 MG/GM cream    ESTRACE VAGINAL    42.5 g    Place 2 g vaginally twice a week    Atrophic vaginitis       ezetimibe 10 MG tablet    ZETIA    90 tablet    TAKE 1 TABLET BY MOUTH EVERY DAY OR AS DIRECTED BY DR WOLF    Hyperlipidemia LDL goal <70, Benign essential hypertension       ferrous gluconate 324 (38 Fe) MG tablet    FERGON    90 tablet    Take 1 tablet (324 mg) by mouth 3 times daily (with meals)    Liver replaced by transplant (H)       folic acid 1 MG tablet    FOLVITE    100 tablet    Take 1 tablet (1 mg) by mouth daily    Liver replaced by transplant (H)       furosemide 20 MG tablet    LASIX    45 tablet    TAKE 1/2 TABLET BY MOUTH EVERY DAY    CKD (chronic kidney disease) stage 3, GFR 30-59 ml/min (H), Liver replaced by transplant (H)       hydrALAZINE 25 MG tablet    APRESOLINE    270 tablet    Take 0.5 tablets (12.5 mg) by mouth 3 times daily as needed , if systolic blood pressure is more than 140 mmHg.    HTN (hypertension)       ketoconazole 2 % cream    NIZORAL    15 g    Apply topically 2 times daily To angles of mouth    Angular cheilitis       LOVAZA 1 g capsule   Generic drug:  omega-3 acid ethyl esters     360 capsule    Take 1 capsule (1 g) by mouth 2 times daily    Hypertriglyceridemia       meclizine 25 MG tablet    ANTIVERT    30 tablet    Take 1 tablet (25 mg) by mouth as needed    Dizziness       metoprolol succinate 50 MG 24 hr tablet    TOPROL-XL    90 tablet    Take 1 tablet (50 mg) by mouth daily    Paroxysmal atrial fibrillation (H)       neomycin-polymyxin-hydrocortisone 3.5-81810-4 otic suspension    CORTISPORIN    10 mL    Place 4 drops Into the left ear 4 times daily    Acute swimmer's ear of left side       nitroFURantoin  (macrocrystal-monohydrate) 100 MG capsule    MACROBID    14 capsule    Take 1 capsule (100 mg) by mouth 2 times daily For one week at onset of UTI symptoms    Urinary tract infection without hematuria, site unspecified       predniSONE 5 MG tablet    DELTASONE    90 tablet    Take 1 tablet (5 mg) by mouth daily    Thyroid cancer (H), Liver replaced by transplant (H)       PRESERVISION AREDS 2 Caps      Take 1 capsule by mouth 2 times daily        sertraline 100 MG tablet    ZOLOFT    90 tablet    Take 1 tab (100mg) PO daily    Adjustment disorder with depressed mood       sirolimus 1 MG Tabs tablet     45 tablet    Take 1 tablet (1 mg) by mouth every other day    Liver replaced by transplant (H)       SUMAtriptan 50 MG tablet    IMITREX    30 tablet    Take 1 tablet (50 mg) by mouth at onset of headache for migraine repeat after 2 hours if needed.    Migraine without aura and without status migrainosus, not intractable       * SYNTHROID 150 MCG tablet   Generic drug:  levothyroxine      Take 225 mcg by mouth on Mondays        * levothyroxine 150 MCG tablet    SYNTHROID/LEVOTHROID    102 tablet    TAKE 1.5 TABLETS BY MOUTH ON MONDAYS. AND 1 TABLET DAILY, THE OTHER DAYS OF THE WEEK.    Malignant neoplasm of thyroid gland (H)       triamcinolone 0.1 % cream    KENALOG     Apply topically 2 times daily as needed for irritation        ursodiol 250 MG tablet    ACTIGALL    180 tablet    Take 1 tablet (250 mg) by mouth 2 times daily    Liver replaced by transplant (H)       voriconazole 200 MG tablet    VFEND    90 tablet    TAKE ONE TABLET BY MOUTH TWICE DAILY    Coccidioidal pneumonitis (H)       * Notice:  This list has 2 medication(s) that are the same as other medications prescribed for you. Read the directions carefully, and ask your doctor or other care provider to review them with you.

## 2018-10-08 NOTE — PROGRESS NOTES
History of Present Illness - Luz Thompson is a 68 year old female here in close follow up from 9/10/18, to check on the resolution of a LEFT otitis externa and otitis media, with a perforation.    To review, she has had LEFT ear surgery with a tympanoplasty in 1996.  Otherwise no chronic ear disease.  She has also had thyroid surgery and ablation in March 2010.  The main reason she is here is progressive bilateral ear fullness and blockage of her hearing aids with cerumen.    At the March 2014 visit, she had been through quite an ordeal, having been hospitalized for E. Coli sepsis, the source was unknown. No new issues with that problem since then. And after asking her about it, it happened again this past June of 2015. Her past year had been fine, and other than fullness from her cerumen build up, no new ENT issues. No drainage from the ears, no bleeding, no vertigo. She still uses bilateral hearing aids.    About one week prior to the 6/29/18 visit, she noted some drainage from the LEFT ear.  She went to urgent care and was placed on some antibiotics.  Of note, she also got some pink eye.  However, she was not given drops because of a concern for drug interaction.  However, she was then placed on oral antibiotics.  On exam, the LEFT ear was completely filled with purulence, and cellulitic changes were starting in the external ear soft tissues and skin.  I treated her with debridement, ciprodex, and continued to the oral antibiotics.  Also, on exam after debridement I found a 10% perforation of the LEFT tympanic membrane.  At the follow up on 7/16, the infeciton was 90% cleared, but there was still some purulence on the LEFT tympanic membrane and the perforation was unchanged.  Therefore I had her continue cirpodex and she is here for follow up.    We are hoping this resolves, as she leaves for [place] for the winter, in late September.    At the 7/30/18 visit, we had turned a corner, and the infection was totally  cleared. The perforation was smaller as well, leaving only about a 5 % hole.  We were optimistic, and she is here for follow up prior to leaving for [place].    Of note, just at the end of July she was first diagnosed with Type 2 DM.  She thinks her ear has takena  Turn for the worse.  At the follow up on 8/28/18, unfortunately the LEFT ear had a full blown infection again and I debrided it.  The perforation was stable however.  And at the 9/10/18 visit, the canal was much better, clear.  But the tympanic membrane edema and perforation was unchanged.  I placed her on preventive drops and she is here in follow up prior to leaving for the winter.    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     Prophylactic antibiotic     High risk medication use     Statin intolerance     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     SNHL (sensorineural hearing loss)     Abnormal liver function tests     Liver replaced by transplant (H)     Type 2 diabetes, HbA1c goal < 7% (H)     Hyperlipidemia LDL goal <70     Hypertension goal BP (blood pressure) < 140/80     Postoperative hypothyroidism     Type 2 diabetes mellitus with stage 3 chronic kidney disease, without long-term current use of insulin (H)     Age-related osteoporosis without current pathological fracture     Perforation of tympanic membrane, left     Other infective chronic otitis externa of left ear     CKD (chronic kidney disease) stage 3, GFR 30-59 ml/min (H)       Current Medications -   Current Outpatient Prescriptions:      acetaminophen 500 MG CAPS, Take 1,000 mg by mouth nightly as needed Take 500-1,000 mg by mouth every 6 hours if needed. , Disp: , Rfl:      blood glucose monitoring (NO BRAND SPECIFIED) meter device kit, Use to test blood sugar 2times daily or as  directed., Disp: 1 kit, Rfl: 0     blood glucose monitoring (NO BRAND SPECIFIED) test strip, Use to test blood sugar 2 times daily, before breakfast and before bedtime, Disp: 200 each, Rfl: 3     calcitRIOL (ROCALTROL) 0.5 MCG capsule, Take 1 capsule (0.5 mcg) by mouth daily, Disp: 90 capsule, Rfl: 3     ciprofloxacin-dexamethasone (CIPRODEX) otic suspension, Place 4 drops Into the left ear three times a week, Disp: 5.6 mL, Rfl: 6     clindamycin (CLEOCIN) 300 MG capsule, Take 1 capsule (300 mg) by mouth 3 times daily, Disp: 30 capsule, Rfl: 0     clotrimazole (LOTRIMIN) 1 % cream, Apply topically 2 times daily as needed Reported on 4/25/2017, Disp: , Rfl:      ELIQUIS 2.5 MG tablet, Take 1 tablet (2.5 mg) by mouth 2 times daily, Disp: 180 tablet, Rfl: 3     estradiol (ESTRACE VAGINAL) 0.1 MG/GM cream, Place 2 g vaginally twice a week, Disp: 42.5 g, Rfl: 11     ezetimibe (ZETIA) 10 MG tablet, TAKE 1 TABLET BY MOUTH EVERY DAY OR AS DIRECTED BY DR WOLF, Disp: 90 tablet, Rfl: 3     ferrous gluconate (FERGON) 324 (38 Fe) MG tablet, Take 1 tablet (324 mg) by mouth 3 times daily (with meals), Disp: 90 tablet, Rfl: 0     folic acid (FOLVITE) 1 MG tablet, Take 1 tablet (1 mg) by mouth daily, Disp: 100 tablet, Rfl: 3     furosemide (LASIX) 20 MG tablet, TAKE 1/2 TABLET BY MOUTH EVERY DAY, Disp: 45 tablet, Rfl: 3     hydrALAZINE (APRESOLINE) 25 MG tablet, Take 0.5 tablets (12.5 mg) by mouth 3 times daily as needed , if systolic blood pressure is more than 140 mmHg., Disp: 270 tablet, Rfl: 3     Hypromellose (ARTIFICIAL TEARS OP), Apply 1 drop to eye as needed, Disp: , Rfl:      ketoconazole (NIZORAL) 2 % cream, Apply topically 2 times daily To angles of mouth, Disp: 15 g, Rfl: 0     levothyroxine (SYNTHROID) 150 MCG tablet, Take 225 mcg by mouth on Mondays, Disp: , Rfl:      levothyroxine (SYNTHROID/LEVOTHROID) 150 MCG tablet, TAKE 1.5 TABLETS BY MOUTH ON MONDAYS. AND 1 TABLET DAILY, THE OTHER DAYS OF THE WEEK., Disp: 102  tablet, Rfl: 3     meclizine (ANTIVERT) 25 MG tablet, Take 1 tablet (25 mg) by mouth as needed, Disp: 30 tablet, Rfl: 11     metoprolol succinate (TOPROL-XL) 50 MG 24 hr tablet, Take 1 tablet (50 mg) by mouth daily, Disp: 90 tablet, Rfl: 3     Multiple Vitamins-Minerals (PRESERVISION AREDS 2) CAPS, Take 1 capsule by mouth 2 times daily, Disp: , Rfl:      neomycin-polymyxin-hydrocortisone (CORTISPORIN) 3.5-47218-7 otic suspension, Place 4 drops Into the left ear 4 times daily, Disp: 10 mL, Rfl: 0     nitroFURantoin, macrocrystal-monohydrate, (MACROBID) 100 MG capsule, Take 1 capsule (100 mg) by mouth 2 times daily For one week at onset of UTI symptoms, Disp: 14 capsule, Rfl: 6     omega-3 acid ethyl esters (LOVAZA) 1 g capsule, Take 1 capsule (1 g) by mouth 2 times daily, Disp: 360 capsule, Rfl: 3     predniSONE (DELTASONE) 5 MG tablet, Take 1 tablet (5 mg) by mouth daily, Disp: 90 tablet, Rfl: 3     RAPAMUNE (BRAND) 1 MG PO TABLET, Take 1 tablet (1 mg) by mouth every other day, Disp: 45 tablet, Rfl: 3     sertraline (ZOLOFT) 100 MG tablet, Take 1 tab (100mg) PO daily, Disp: 90 tablet, Rfl: 1     SUMAtriptan (IMITREX) 50 MG tablet, Take 1 tablet (50 mg) by mouth at onset of headache for migraine repeat after 2 hours if needed., Disp: 30 tablet, Rfl: 3     triamcinolone (KENALOG) 0.1 % cream, Apply topically 2 times daily as needed for irritation, Disp: , Rfl:      ursodiol (ACTIGALL) 250 MG tablet, Take 1 tablet (250 mg) by mouth 2 times daily, Disp: 180 tablet, Rfl: 3     voriconazole (VFEND) 200 MG tablet, TAKE ONE TABLET BY MOUTH TWICE DAILY , Disp: 90 tablet, Rfl: 3     Wheat Dextrin (BENEFIBER) POWD, 2 teaspoons daily, Disp: , Rfl:     Allergies -   Allergies   Allergen Reactions     Fluconazole Hives and Itching     Ciprofloxacin Anxiety and Other (See Comments)     Irregular heart beat     Azithromycin Itching     Benadryl [Diphenhydramine Hcl]      Insomnia      Cellcept Diarrhea     Ciprofloxacin Other (See  "Comments)     Insomnia, mood lability     Codeine      Psych disturbance     Codeine      Diphenhydramine Other (See Comments)     Doxycycline      Lansoprazole Diarrhea     Levaquin [Levofloxacin] Other (See Comments)     Headache, hyperactivity     Lisinopril Cough     Methotrexate      Sores     Methotrexate      Morphine Sulfate Itching     Mycophenolate Diarrhea     No Clinical Screening - See Comments      Simvastatin Muscle Pain (Myalgia)     severe     Cephalexin Rash     Fever and skin burning     Penicillin G Itching and Rash     Tolectin [Nsaids] Rash     Tramadol Rash       Social History -   Social History     Social History     Marital status: Legally      Spouse name: Robbin Thompson     Number of children: 4     Years of education: 20     Occupational History     [employer]ian [employer]  [employer]     social work      Self     Social History Main Topics     Smoking status: Former Smoker     Packs/day: 1.00     Years: 18.00     Types: Cigarettes     Quit date: 4/12/1985     Smokeless tobacco: Never Used     Alcohol use 0.0 oz/week     0 Standard drinks or equivalent per week      Comment: rare - \"I toast at weddings\"     Drug use: No     Sexual activity: Yes     Partners: Male     Birth control/ protection: Post-menopausal     Other Topics Concern     Exercise Yes     cardio and strengthing     Social History Narrative    She is retired. She and her  travel around the [place] in an RV. [place] in [place] the course of the [place]. 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       Family History -   Family History   Problem Relation Age of Onset     Hypertension Mother      Endometrial Cancer Mother      Hyperlipidemia Mother      Prostate Cancer Father      Macular Degeneration Father      Cancer - colorectal Maternal Grandmother      in her 80's, has surgery and removal of part " "of kidney,  at age 98     HEART DISEASE Maternal Grandfather       at 98     Glaucoma Maternal Grandfather      Cerebrovascular Disease Paternal Grandmother      in her 80's     Hypertension Paternal Grandmother      HEART DISEASE Paternal Grandfather      MI     Alzheimer Disease Paternal Grandfather      Allergies Son      Neurologic Disorder Daughter      Migraines     Breast Cancer Other      Anesthesia Reaction No family hx of      Crohn Disease No family hx of      Ulcerative Colitis No family hx of        Review of Systems - As per HPI and PMHx, otherwise 10+ system review of the head and neck, and general constitution is negative.    Physical Exam  /64 (BP Location: Left arm)  Pulse 76  Resp 12  Ht 1.6 m (5' 3\")  Wt 79.4 kg (175 lb)  LMP 1988 (Approximate)  SpO2 98%  BMI 31 kg/m2    General - The patient is well nourished and well developed, and appears to have good nutritional status.  Alert and oriented to person and place, answers questions and cooperates with examination appropriately.   Head and Face - Normocephalic and atraumatic, with no gross asymmetry noted of the contour of the facial features.  The facial nerve is intact, with strong symmetric movements.  Voice and Breathing - The patient was breathing comfortably without the use of accessory muscles. There was no wheezing, stridor, or stertor.  The patients voice was clear and strong, and had appropriate pitch and quality.  Ears - The RIGHT ear and RIGHT tympanic membrane are healthy and normal.  The LEFT ear canal is much better, no moisture or purulence at all.  The tympanic membrane is no longer edematous, and the perforation is stable at 10-15% in the posterior inferior quadrant.  Eyes - Extraocular movements intact, and the pupils were reactive to light.  Sclera were not icteric or injected, conjunctiva were pink and moist.        A/P - Luz Thompson is a 68 year old female  (H90.3) Sensorineural hearing loss " (SNHL) of both ears  (primary encounter diagnosis)  (Z98.890) S/P tympanoplasty  (H72.92) Perforation of tympanic membrane, left    The LEFT ear looks great again, but the tympanic membrane is no longer edematous but the perforation has not changed.    I recommend ciprodex three times per week in the LEFT ear and strict water precautions.  Come back and see me as soon as she returns from [place] for the winter

## 2018-10-08 NOTE — LETTER
10/8/2018         RE: Luz Thompson  [address]  [place] [address]        Dear Colleague,    Thank you for referring your patient, Luz Thompson, to the [facility]. Please see a copy of my visit note below.    History of Present Illness - Luz Thompson is a 68 year old female here in close follow up from 9/10/18, to check on the resolution of a LEFT otitis externa and otitis media, with a perforation.    To review, she has had LEFT ear surgery with a tympanoplasty in 1996.  Otherwise no chronic ear disease.  She has also had thyroid surgery and ablation in March 2010.  The main reason she is here is progressive bilateral ear fullness and blockage of her hearing aids with cerumen.    At the March 2014 visit, she had been through quite an ordeal, having been hospitalized for E. Coli sepsis, the source was unknown. No new issues with that problem since then. And after asking her about it, it happened again this past June of 2015. Her past year had been fine, and other than fullness from her cerumen build up, no new ENT issues. No drainage from the ears, no bleeding, no vertigo. She still uses bilateral hearing aids.    About one week prior to the 6/29/18 visit, she noted some drainage from the LEFT ear.  She went to urgent care and was placed on some antibiotics.  Of note, she also got some pink eye.  However, she was not given drops because of a concern for drug interaction.  However, she was then placed on oral antibiotics.  On exam, the LEFT ear was completely filled with purulence, and cellulitic changes were starting in the external ear soft tissues and skin.  I treated her with debridement, ciprodex, and continued to the oral antibiotics.  Also, on exam after debridement I found a 10% perforation of the LEFT tympanic membrane.  At the follow up on 7/16, the infeciton was 90% cleared, but there was still some purulence on the LEFT tympanic membrane and the  perforation was unchanged.  Therefore I had her continue cirpodex and she is here for follow up.    We are hoping this resolves, as she leaves for [place] for the winter, in late September.    At the 7/30/18 visit, we had turned a corner, and the infection was totally cleared. The perforation was smaller as well, leaving only about a 5 % hole.  We were optimistic, and she is here for follow up prior to leaving for [place].    Of note, just at the end of July she was first diagnosed with Type 2 DM.  She thinks her ear has takena  Turn for the worse.  At the follow up on 8/28/18, unfortunately the LEFT ear had a full blown infection again and I debrided it.  The perforation was stable however.  And at the 9/10/18 visit, the canal was much better, clear.  But the tympanic membrane edema and perforation was unchanged.  I placed her on preventive drops and she is here in follow up prior to leaving for the winter.    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     Prophylactic antibiotic     High risk medication use     Statin intolerance     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     SNHL (sensorineural hearing loss)     Abnormal liver function tests     Liver replaced by transplant (H)     Type 2 diabetes, HbA1c goal < 7% (H)     Hyperlipidemia LDL goal <70     Hypertension goal BP (blood pressure) < 140/80     Postoperative hypothyroidism     Type 2 diabetes mellitus with stage 3 chronic kidney disease, without long-term current use of insulin (H)     Age-related osteoporosis without current pathological fracture     Perforation of tympanic membrane, left     Other infective chronic otitis externa of left ear     CKD (chronic kidney disease) stage 3, GFR 30-59 ml/min (H)       Current Medications -   Current Outpatient  Prescriptions:      acetaminophen 500 MG CAPS, Take 1,000 mg by mouth nightly as needed Take 500-1,000 mg by mouth every 6 hours if needed. , Disp: , Rfl:      blood glucose monitoring (NO BRAND SPECIFIED) meter device kit, Use to test blood sugar 2times daily or as directed., Disp: 1 kit, Rfl: 0     blood glucose monitoring (NO BRAND SPECIFIED) test strip, Use to test blood sugar 2 times daily, before breakfast and before bedtime, Disp: 200 each, Rfl: 3     calcitRIOL (ROCALTROL) 0.5 MCG capsule, Take 1 capsule (0.5 mcg) by mouth daily, Disp: 90 capsule, Rfl: 3     ciprofloxacin-dexamethasone (CIPRODEX) otic suspension, Place 4 drops Into the left ear three times a week, Disp: 5.6 mL, Rfl: 6     clindamycin (CLEOCIN) 300 MG capsule, Take 1 capsule (300 mg) by mouth 3 times daily, Disp: 30 capsule, Rfl: 0     clotrimazole (LOTRIMIN) 1 % cream, Apply topically 2 times daily as needed Reported on 4/25/2017, Disp: , Rfl:      ELIQUIS 2.5 MG tablet, Take 1 tablet (2.5 mg) by mouth 2 times daily, Disp: 180 tablet, Rfl: 3     estradiol (ESTRACE VAGINAL) 0.1 MG/GM cream, Place 2 g vaginally twice a week, Disp: 42.5 g, Rfl: 11     ezetimibe (ZETIA) 10 MG tablet, TAKE 1 TABLET BY MOUTH EVERY DAY OR AS DIRECTED BY DR WOLF, Disp: 90 tablet, Rfl: 3     ferrous gluconate (FERGON) 324 (38 Fe) MG tablet, Take 1 tablet (324 mg) by mouth 3 times daily (with meals), Disp: 90 tablet, Rfl: 0     folic acid (FOLVITE) 1 MG tablet, Take 1 tablet (1 mg) by mouth daily, Disp: 100 tablet, Rfl: 3     furosemide (LASIX) 20 MG tablet, TAKE 1/2 TABLET BY MOUTH EVERY DAY, Disp: 45 tablet, Rfl: 3     hydrALAZINE (APRESOLINE) 25 MG tablet, Take 0.5 tablets (12.5 mg) by mouth 3 times daily as needed , if systolic blood pressure is more than 140 mmHg., Disp: 270 tablet, Rfl: 3     Hypromellose (ARTIFICIAL TEARS OP), Apply 1 drop to eye as needed, Disp: , Rfl:      ketoconazole (NIZORAL) 2 % cream, Apply topically 2 times daily To angles of mouth,  Disp: 15 g, Rfl: 0     levothyroxine (SYNTHROID) 150 MCG tablet, Take 225 mcg by mouth on Mondays, Disp: , Rfl:      levothyroxine (SYNTHROID/LEVOTHROID) 150 MCG tablet, TAKE 1.5 TABLETS BY MOUTH ON MONDAYS. AND 1 TABLET DAILY, THE OTHER DAYS OF THE WEEK., Disp: 102 tablet, Rfl: 3     meclizine (ANTIVERT) 25 MG tablet, Take 1 tablet (25 mg) by mouth as needed, Disp: 30 tablet, Rfl: 11     metoprolol succinate (TOPROL-XL) 50 MG 24 hr tablet, Take 1 tablet (50 mg) by mouth daily, Disp: 90 tablet, Rfl: 3     Multiple Vitamins-Minerals (PRESERVISION AREDS 2) CAPS, Take 1 capsule by mouth 2 times daily, Disp: , Rfl:      neomycin-polymyxin-hydrocortisone (CORTISPORIN) 3.5-34996-8 otic suspension, Place 4 drops Into the left ear 4 times daily, Disp: 10 mL, Rfl: 0     nitroFURantoin, macrocrystal-monohydrate, (MACROBID) 100 MG capsule, Take 1 capsule (100 mg) by mouth 2 times daily For one week at onset of UTI symptoms, Disp: 14 capsule, Rfl: 6     omega-3 acid ethyl esters (LOVAZA) 1 g capsule, Take 1 capsule (1 g) by mouth 2 times daily, Disp: 360 capsule, Rfl: 3     predniSONE (DELTASONE) 5 MG tablet, Take 1 tablet (5 mg) by mouth daily, Disp: 90 tablet, Rfl: 3     RAPAMUNE (BRAND) 1 MG PO TABLET, Take 1 tablet (1 mg) by mouth every other day, Disp: 45 tablet, Rfl: 3     sertraline (ZOLOFT) 100 MG tablet, Take 1 tab (100mg) PO daily, Disp: 90 tablet, Rfl: 1     SUMAtriptan (IMITREX) 50 MG tablet, Take 1 tablet (50 mg) by mouth at onset of headache for migraine repeat after 2 hours if needed., Disp: 30 tablet, Rfl: 3     triamcinolone (KENALOG) 0.1 % cream, Apply topically 2 times daily as needed for irritation, Disp: , Rfl:      ursodiol (ACTIGALL) 250 MG tablet, Take 1 tablet (250 mg) by mouth 2 times daily, Disp: 180 tablet, Rfl: 3     voriconazole (VFEND) 200 MG tablet, TAKE ONE TABLET BY MOUTH TWICE DAILY , Disp: 90 tablet, Rfl: 3     Wheat Dextrin (BENEFIBER) POWD, 2 teaspoons daily, Disp: , Rfl:     Allergies -  "  Allergies   Allergen Reactions     Fluconazole Hives and Itching     Ciprofloxacin Anxiety and Other (See Comments)     Irregular heart beat     Azithromycin Itching     Benadryl [Diphenhydramine Hcl]      Insomnia      Cellcept Diarrhea     Ciprofloxacin Other (See Comments)     Insomnia, mood lability     Codeine      Psych disturbance     Codeine      Diphenhydramine Other (See Comments)     Doxycycline      Lansoprazole Diarrhea     Levaquin [Levofloxacin] Other (See Comments)     Headache, hyperactivity     Lisinopril Cough     Methotrexate      Sores     Methotrexate      Morphine Sulfate Itching     Mycophenolate Diarrhea     No Clinical Screening - See Comments      Simvastatin Muscle Pain (Myalgia)     severe     Cephalexin Rash     Fever and skin burning     Penicillin G Itching and Rash     Tolectin [Nsaids] Rash     Tramadol Rash       Social History -   Social History     Social History     Marital status: Legally      Spouse name: Robbin Thompson     Number of children: 4     Years of education: 20     Occupational History     [employer]  [employer]     social work      Self     Social History Main Topics     Smoking status: Former Smoker     Packs/day: 1.00     Years: 18.00     Types: Cigarettes     Quit date: 4/12/1985     Smokeless tobacco: Never Used     Alcohol use 0.0 oz/week     0 Standard drinks or equivalent per week      Comment: rare - \"I toast at weddings\"     Drug use: No     Sexual activity: Yes     Partners: Male     Birth control/ protection: Post-menopausal     Other Topics Concern     Exercise Yes     cardio and strengthing     Social History Narrative    She is retired. She and her  travel around the [place] in an [place]. [place] course of the [place]. 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       Family History -   Family History " "  Problem Relation Age of Onset     Hypertension Mother      Endometrial Cancer Mother      Hyperlipidemia Mother      Prostate Cancer Father      Macular Degeneration Father      Cancer - colorectal Maternal Grandmother      in her 80's, has surgery and removal of part of kidney,  at age 98     HEART DISEASE Maternal Grandfather       at 98     Glaucoma Maternal Grandfather      Cerebrovascular Disease Paternal Grandmother      in her 80's     Hypertension Paternal Grandmother      HEART DISEASE Paternal Grandfather      MI     Alzheimer Disease Paternal Grandfather      Allergies Son      Neurologic Disorder Daughter      Migraines     Breast Cancer Other      Anesthesia Reaction No family hx of      Crohn Disease No family hx of      Ulcerative Colitis No family hx of        Review of Systems - As per HPI and PMHx, otherwise 10+ system review of the head and neck, and general constitution is negative.    Physical Exam  /64 (BP Location: Left arm)  Pulse 76  Resp 12  Ht 1.6 m (5' 3\")  Wt 79.4 kg (175 lb)  LMP 1988 (Approximate)  SpO2 98%  BMI 31 kg/m2    General - The patient is well nourished and well developed, and appears to have good nutritional status.  Alert and oriented to person and place, answers questions and cooperates with examination appropriately.   Head and Face - Normocephalic and atraumatic, with no gross asymmetry noted of the contour of the facial features.  The facial nerve is intact, with strong symmetric movements.  Voice and Breathing - The patient was breathing comfortably without the use of accessory muscles. There was no wheezing, stridor, or stertor.  The patients voice was clear and strong, and had appropriate pitch and quality.  Ears - The RIGHT ear and RIGHT tympanic membrane are healthy and normal.  The LEFT ear canal is much better, no moisture or purulence at all.  The tympanic membrane is no longer edematous, and the perforation is stable at 10-15% in " the posterior inferior quadrant.  Eyes - Extraocular movements intact, and the pupils were reactive to light.  Sclera were not icteric or injected, conjunctiva were pink and moist.        A/P - Luz Thompson is a 68 year old female  (H90.3) Sensorineural hearing loss (SNHL) of both ears  (primary encounter diagnosis)  (Z98.890) S/P tympanoplasty  (H72.92) Perforation of tympanic membrane, left    The LEFT ear looks great again, but the tympanic membrane is no longer edematous but the perforation has not changed.    I recommend ciprodex three times per week in the LEFT ear and strict water precautions.  Come back and see me as soon as she returns from [place] for the winter      Again, thank you for allowing me to participate in the care of your patient.        Sincerely,        Randell Mejia MD

## 2018-10-08 NOTE — PATIENT INSTRUCTIONS
Scheduling Information  To schedule your CT/MRI scan, please contact [facility] at [phone] OR [facility] at [phone]    To schedule your Surgery, please contact our Specialty Schedulers at [phone]      ENT Clinic Locations Clinic Hours Telephone Number     [facility]  [address] [place] [address]   Monday:           1:00pm -- 5:00pm    Friday:              8:00am - 12:00pm   To schedule/reschedule an appointment with   Dr. Mejia,   please contact our   Specialty Scheduling Department at:     [phone]       [facility]  [address] [place] [address] Tuesday:          8:00am -- 2:00pm         Urgent Care Locations Clinic Hours Telephone Numbers     [facility]  [address] [place] [address]     Monday-Friday:     11:00am - 9:00pm    Saturday-Sunday:  9:00am - 5:00pm   [phone]     [facility]  [address] [place] [address]     Monday-Friday:      5:00pm - 9:00pm     Saturday-Sunday:  9:00am - 5:00pm   [phone]

## 2018-10-16 DIAGNOSIS — C73 PAPILLARY THYROID CARCINOMA (H): ICD-10-CM

## 2018-10-16 DIAGNOSIS — Z94.4 LIVER REPLACED BY TRANSPLANT (H): ICD-10-CM

## 2018-10-16 DIAGNOSIS — N18.30 TYPE 2 DIABETES MELLITUS WITH STAGE 3 CHRONIC KIDNEY DISEASE, WITHOUT LONG-TERM CURRENT USE OF INSULIN (H): ICD-10-CM

## 2018-10-16 DIAGNOSIS — E78.5 HYPERLIPIDEMIA LDL GOAL <100: ICD-10-CM

## 2018-10-16 DIAGNOSIS — E11.22 TYPE 2 DIABETES MELLITUS WITH STAGE 3 CHRONIC KIDNEY DISEASE, WITHOUT LONG-TERM CURRENT USE OF INSULIN (H): ICD-10-CM

## 2018-10-16 LAB
ALBUMIN SERPL-MCNC: 3 G/DL (ref 3.4–5)
ALBUMIN UR-MCNC: 100 MG/DL
ALP SERPL-CCNC: 238 U/L (ref 40–150)
ALT SERPL W P-5'-P-CCNC: 23 U/L (ref 0–50)
ANION GAP SERPL CALCULATED.3IONS-SCNC: 9 MMOL/L (ref 3–14)
APPEARANCE UR: ABNORMAL
AST SERPL W P-5'-P-CCNC: 19 U/L (ref 0–45)
BACTERIA #/AREA URNS HPF: ABNORMAL /HPF
BILIRUB DIRECT SERPL-MCNC: <0.1 MG/DL (ref 0–0.2)
BILIRUB SERPL-MCNC: 0.2 MG/DL (ref 0.2–1.3)
BILIRUB UR QL STRIP: NEGATIVE
BUN SERPL-MCNC: 26 MG/DL (ref 7–30)
CALCIUM SERPL-MCNC: 8.9 MG/DL (ref 8.5–10.1)
CHLORIDE SERPL-SCNC: 102 MMOL/L (ref 94–109)
CHOLEST SERPL-MCNC: 300 MG/DL
CO2 SERPL-SCNC: 27 MMOL/L (ref 20–32)
COLOR UR AUTO: YELLOW
CREAT SERPL-MCNC: 1.19 MG/DL (ref 0.52–1.04)
CREAT UR-MCNC: 136 MG/DL
ERYTHROCYTE [DISTWIDTH] IN BLOOD BY AUTOMATED COUNT: 17.2 % (ref 10–15)
GFR SERPL CREATININE-BSD FRML MDRD: 45 ML/MIN/1.7M2
GLUCOSE SERPL-MCNC: 102 MG/DL (ref 70–99)
GLUCOSE UR STRIP-MCNC: NEGATIVE MG/DL
HBA1C MFR BLD: 6.4 % (ref 0–5.6)
HCT VFR BLD AUTO: 33.1 % (ref 35–47)
HDLC SERPL-MCNC: 52 MG/DL
HGB BLD-MCNC: 10.3 G/DL (ref 11.7–15.7)
HGB UR QL STRIP: ABNORMAL
KETONES UR STRIP-MCNC: NEGATIVE MG/DL
LDLC SERPL CALC-MCNC: 194 MG/DL
LEUKOCYTE ESTERASE UR QL STRIP: ABNORMAL
MCH RBC QN AUTO: 27.4 PG (ref 26.5–33)
MCHC RBC AUTO-ENTMCNC: 31.1 G/DL (ref 31.5–36.5)
MCV RBC AUTO: 88 FL (ref 78–100)
MICROALBUMIN UR-MCNC: 1130 MG/L
MICROALBUMIN/CREAT UR: 830.88 MG/G CR (ref 0–25)
NITRATE UR QL: NEGATIVE
NON-SQ EPI CELLS #/AREA URNS LPF: ABNORMAL /LPF
NONHDLC SERPL-MCNC: 248 MG/DL
PH UR STRIP: 6 PH (ref 5–7)
PLATELET # BLD AUTO: 385 10E9/L (ref 150–450)
POTASSIUM SERPL-SCNC: 3.8 MMOL/L (ref 3.4–5.3)
PROT SERPL-MCNC: 7.7 G/DL (ref 6.8–8.8)
PROT UR-MCNC: 1.49 G/L
PROT/CREAT 24H UR: 1.22 G/G CR (ref 0–0.2)
RBC # BLD AUTO: 3.76 10E12/L (ref 3.8–5.2)
RBC #/AREA URNS AUTO: ABNORMAL /HPF
SIROLIMUS BLD-MCNC: 4.2 UG/L (ref 5–15)
SODIUM SERPL-SCNC: 138 MMOL/L (ref 133–144)
SOURCE: ABNORMAL
SP GR UR STRIP: 1.02 (ref 1–1.03)
T4 FREE SERPL-MCNC: 0.85 NG/DL (ref 0.76–1.46)
TME LAST DOSE: ABNORMAL H
TRIGL SERPL-MCNC: 269 MG/DL
TSH SERPL DL<=0.005 MIU/L-ACNC: 18.39 MU/L (ref 0.4–4)
UROBILINOGEN UR STRIP-ACNC: 0.2 EU/DL (ref 0.2–1)
WBC # BLD AUTO: 7.2 10E9/L (ref 4–11)
WBC #/AREA URNS AUTO: ABNORMAL /HPF

## 2018-10-16 PROCEDURE — 84439 ASSAY OF FREE THYROXINE: CPT | Performed by: INTERNAL MEDICINE

## 2018-10-16 PROCEDURE — 80048 BASIC METABOLIC PNL TOTAL CA: CPT | Performed by: INTERNAL MEDICINE

## 2018-10-16 PROCEDURE — 84443 ASSAY THYROID STIM HORMONE: CPT | Performed by: INTERNAL MEDICINE

## 2018-10-16 PROCEDURE — 83036 HEMOGLOBIN GLYCOSYLATED A1C: CPT | Performed by: INTERNAL MEDICINE

## 2018-10-16 PROCEDURE — 80195 ASSAY OF SIROLIMUS: CPT | Performed by: INTERNAL MEDICINE

## 2018-10-16 PROCEDURE — 36415 COLL VENOUS BLD VENIPUNCTURE: CPT | Performed by: INTERNAL MEDICINE

## 2018-10-16 PROCEDURE — 85027 COMPLETE CBC AUTOMATED: CPT | Performed by: INTERNAL MEDICINE

## 2018-10-16 PROCEDURE — 80076 HEPATIC FUNCTION PANEL: CPT | Performed by: INTERNAL MEDICINE

## 2018-10-16 PROCEDURE — 82043 UR ALBUMIN QUANTITATIVE: CPT | Performed by: INTERNAL MEDICINE

## 2018-10-16 PROCEDURE — 81001 URINALYSIS AUTO W/SCOPE: CPT | Performed by: INTERNAL MEDICINE

## 2018-10-16 PROCEDURE — 80061 LIPID PANEL: CPT | Performed by: INTERNAL MEDICINE

## 2018-10-16 PROCEDURE — 84156 ASSAY OF PROTEIN URINE: CPT | Performed by: INTERNAL MEDICINE

## 2018-10-17 NOTE — PROGRESS NOTES
Dear [place]    Your test results are attached.     The cholesterol continues to run high. I will forward these results to Dr. Reyna.     Please contact me by NakedRoomt if you have any questions about your labs or management.    Jennifer Barraza MD

## 2018-10-19 NOTE — PROGRESS NOTES
Pt called requesting a new post transplant lab letter be sent electronically to her as she is leaving for [place] and wants to have it with her.

## 2018-10-19 NOTE — LETTER
OUTPATIENT OR TRANSITIONAL CARE  LABORATORY TEST ORDER   Pt taking to [place]    Patient Name: Luz Thompson  Transplant Date: 5/22/2002   YOB: 1950  Issue Date: 10/19/18 @ 1432  [facility] MR#: [id number]  Expiration Date: 10/19/19       Diagnoses: [x]      Liver Transplant (ICD-10 Z94.4)    [x]      Long term use of medications (ICD-10 Z79.899)     Please fax results to [phone]    Frequency:  EVERY 2 MONTHS AND PRN       [x] CBC with Platelets   [x] Basic Metabolic Panel (Sodium, Potassium, Chloride, CO2, Creatinine, Urea Nitrogen, Glucose, Calcium)  [x] Phosphorous, Magnesium  [x] Hepatic panel (Albumin, Alk Phos, ALT, AST, Direct and Total Bili)  [x] Rapamune drug level - 24 hour trough, please document time of last dose    If you have questions, please call [phone] or [phone].    .10/19/18 @[facility]

## 2018-10-19 NOTE — PROGRESS NOTES
Called patient.  She reports not being compliant with taking the thyroid hormone levels on a daily basis.  She is leaving to [place] in 1 week.  The agreement was to recheck the thyroid hormone levels in 2 months.  Reports good fasting blood glucose numbers.  A1c down to 6.4.  The degree of proteinuria has worsened.  Discussed about the option of consulting nephrology, to see whether or not treatment with ARB is/ACE inhibitors is an option.  The patient prefers to address this when she comes back from [place].  Meantime, I placed a referral for nephrology.

## 2018-10-22 ENCOUNTER — TELEPHONE (OUTPATIENT)
Dept: ENDOCRINOLOGY | Facility: CLINIC | Age: 68
End: 2018-10-22

## 2018-10-22 NOTE — TELEPHONE ENCOUNTER
Please mail a copy of the orders for the thyroid hormone levels to the patient.  She is going to have this lab work done in [place].  Also, please schedule her for an appointment with nephrology, when she comes back from [place].     Lab slip mailed [facility] mail.

## 2018-10-23 ENCOUNTER — CARE COORDINATION (OUTPATIENT)
Dept: CARDIOLOGY | Facility: CLINIC | Age: 68
End: 2018-10-23

## 2018-10-23 DIAGNOSIS — Z78.9 STATIN INTOLERANCE: Primary | ICD-10-CM

## 2018-10-23 DIAGNOSIS — E78.5 HYPERLIPIDEMIA LDL GOAL <70: ICD-10-CM

## 2018-10-23 NOTE — PROGRESS NOTES
Date: 10/23/2018    Time of Call: 3:38 PM     Diagnosis:  HLD goad <70, Statin Intolerance      [ TORB ] Ordering provider: Dr Randell Reyna  Order:   -Start Praluent 75 mg q14d     Order received by: Antionette Damon LPN     Follow-up/additional notes: Per Dr Reyna result note below.  Called and spoke with Pt.  Discussed results and recommendations.  Discussed indication and side effects.  Discussed the necessity for labs 1 week after the second injection.  Pt verbalized understanding, agreed to current plan and denied any further questions.

## 2018-10-24 ENCOUNTER — TELEPHONE (OUTPATIENT)
Dept: CARDIOLOGY | Facility: CLINIC | Age: 68
End: 2018-10-24

## 2018-10-24 ENCOUNTER — TELEPHONE (OUTPATIENT)
Dept: FAMILY MEDICINE | Facility: CLINIC | Age: 68
End: 2018-10-24

## 2018-10-24 NOTE — TELEPHONE ENCOUNTER
PA Initiation    Medication: Praluent 75MG/ML Pen (PENDING)  Insurance Company: [employer] - Phone [phone] Fax [phone]  Pharmacy Filling the Rx: [facility] - [place], [place] - [facility] [address]  Filling Pharmacy Phone: [phone]  Filling Pharmacy Fax: [phone]  Start Date: 10/24/2018

## 2018-10-24 NOTE — TELEPHONE ENCOUNTER
She is concerned about a face rash and will schedule with Ayala tomorrow. Instructed to call back to scheduling.   Jennifer Barraza MD

## 2018-10-24 NOTE — TELEPHONE ENCOUNTER
...Reason for Call:  Other     Detailed comments: Patient will be leaving town Friday at 5 pm, she need to speak to Christi and she did not want to say why. She would like a call back right away she will be out of town until May    Phone Number Patient can be reached at: Home number on file [phone] (home)    Best Time: anytime    Can we leave a detailed message on this number? YES    Call taken on 10/24/2018 at 12:55 PM by Dustin Mason

## 2018-10-25 ENCOUNTER — OFFICE VISIT (OUTPATIENT)
Dept: FAMILY MEDICINE | Facility: CLINIC | Age: 68
End: 2018-10-25
Payer: MEDICARE

## 2018-10-25 VITALS
DIASTOLIC BLOOD PRESSURE: 61 MMHG | HEART RATE: 72 BPM | HEIGHT: 63 IN | BODY MASS INDEX: 31.86 KG/M2 | RESPIRATION RATE: 20 BRPM | SYSTOLIC BLOOD PRESSURE: 135 MMHG | OXYGEN SATURATION: 97 % | TEMPERATURE: 98.4 F | WEIGHT: 179.8 LBS

## 2018-10-25 DIAGNOSIS — Z78.0 ASYMPTOMATIC POSTMENOPAUSAL STATUS: ICD-10-CM

## 2018-10-25 DIAGNOSIS — L71.0 PERIORAL DERMATITIS: Primary | ICD-10-CM

## 2018-10-25 PROCEDURE — 99213 OFFICE O/P EST LOW 20 MIN: CPT | Performed by: PHYSICIAN ASSISTANT

## 2018-10-25 ASSESSMENT — PAIN SCALES - GENERAL: PAINLEVEL: NO PAIN (0)

## 2018-10-25 NOTE — PROGRESS NOTES
SUBJECTIVE:   Luz Thompson is a 68 year old female who presents to clinic today for the following health issues:    Rash  Onset: 3 weeks     Description:   Location: around the mouth  Character: red, turns into a pimple then to a blister then flakes away and another comes   Itching (Pruritis): no     Progression of Symptoms:  intermittent    Accompanying Signs & Symptoms:  Fever: no   Body aches or joint pain: no   Sore throat symptoms: no   Recent cold symptoms: no     History:   Previous similar rash: no     Precipitating factors:   Exposure to similar rash: no   New exposures: None   Recent travel: no     Therapies Tried and outcome: Derm cream, jana and shree cream; no relief         Problem list and histories reviewed & adjusted, as indicated.  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     Prophylactic antibiotic     High risk medication use     Statin intolerance     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     SNHL (sensorineural hearing loss)     Abnormal liver function tests     Liver replaced by transplant (H)     Type 2 diabetes, HbA1c goal < 7% (H)     Hyperlipidemia LDL goal <70     Hypertension goal BP (blood pressure) < 140/80     Postoperative hypothyroidism     Type 2 diabetes mellitus with stage 3 chronic kidney disease, without long-term current use of insulin (H)     Age-related osteoporosis without current pathological fracture     Perforation of tympanic membrane, left     Other infective chronic otitis externa of left ear     CKD (chronic kidney disease) stage 3, GFR 30-59 ml/min (H)     Past Surgical History:   Procedure Laterality Date     APPENDECTOMY  1961     BIOPSY       CATARACT IOL, RT/LT      RE12/19/2013, LE12/10/2013 - Toric lenses     CHOLECYSTECTOMY  1991      "COLONOSCOPY  3/10/2014    Procedure: COLONOSCOPY;;  Surgeon: Gentry Ramirez MD;  Location: [facility]     ear drum repair       ENDOBRONCHIAL ULTRASOUND FLEXIBLE N/A 2017    Procedure: ENDOBRONCHIAL ULTRASOUND FLEXIBLE;  Flexible Bronchoscopy, Endobronchial Ultrasound, Transbronchial Needle Aspiration ;  Surgeon: Eden Clinton MD;  Location: [facility]     ENDOSCOPIC RETROGRADE CHOLANGIOPANCREATOGRAM  2013    Procedure: ENDOSCOPIC RETROGRADE CHOLANGIOPANCREATOGRAM;  Endoscopic Retrograde Cholangiopancreatogram with single balloon enteroscopy, ballon sweep of bile duct;  Surgeon: Brett Membreno MD;  Location: [facility]     HC KNEE SCOPE,MED/LAT MENISECTOMY  8/10/12    Right, partial medial menisectomy only     KNEE SURGERY  1966    R knee     PICC INSERTION  2013    4fr SL PASV PICC, 40cm (1cm external) in the R basilic vein w/ tip in the low SVC     PICC INSERTION  2014    5 fr DL BioFlo Navilyst PICC, 46 cm (3 cm external) in the L basilic vein w/ tip in the SVC RA junction.     THYROIDECTOMY  3/2010     TRANSPLANT LIVER RECIPIENT LIVING UNRELATED         Social History   Substance Use Topics     Smoking status: Former Smoker     Packs/day: 1.00     Years: 18.00     Types: Cigarettes     Quit date: 1985     Smokeless tobacco: Never Used     Alcohol use 0.0 oz/week     0 Standard drinks or equivalent per week      Comment: rare - \"I toast at weddings\"     Family History   Problem Relation Age of Onset     Hypertension Mother      Endometrial Cancer Mother      Hyperlipidemia Mother      Prostate Cancer Father      Macular Degeneration Father      Cancer - colorectal Maternal Grandmother      in her 80's, has surgery and removal of part of kidney,  at age 98     HEART DISEASE Maternal Grandfather       at 98     Glaucoma Maternal Grandfather      Cerebrovascular Disease Paternal Grandmother      in her 80's     Hypertension Paternal Grandmother      HEART DISEASE Paternal Grandfather  "     MI     Alzheimer Disease Paternal Grandfather      Allergies Son      Neurologic Disorder Daughter      Migraines     Breast Cancer Other      Anesthesia Reaction No family hx of      Crohn Disease No family hx of      Ulcerative Colitis No family hx of          Current Outpatient Prescriptions   Medication Sig Dispense Refill     acetaminophen 500 MG CAPS Take 1,000 mg by mouth nightly as needed Take 500-1,000 mg by mouth every 6 hours if needed.        alirocumab (PRALUENT) 75 MG/ML injectable pen Inject 1 mL (75 mg) Subcutaneous every 14 days 6 mL 3     blood glucose monitoring (NO BRAND SPECIFIED) meter device kit Use to test blood sugar 2times daily or as directed. 1 kit 0     blood glucose monitoring (NO BRAND SPECIFIED) test strip Use to test blood sugar 2 times daily, before breakfast and before bedtime 200 each 3     calcitRIOL (ROCALTROL) 0.5 MCG capsule Take 1 capsule (0.5 mcg) by mouth daily 90 capsule 3     ciprofloxacin-dexamethasone (CIPRODEX) otic suspension Place 4 drops Into the left ear three times a week 5.6 mL 6     clotrimazole (LOTRIMIN) 1 % cream Apply topically 2 times daily as needed Reported on 4/25/2017       ELIQUIS 2.5 MG tablet Take 1 tablet (2.5 mg) by mouth 2 times daily 180 tablet 3     estradiol (ESTRACE VAGINAL) 0.1 MG/GM cream Place 2 g vaginally twice a week 42.5 g 11     ezetimibe (ZETIA) 10 MG tablet TAKE 1 TABLET BY MOUTH EVERY DAY OR AS DIRECTED BY DR WOLF 90 tablet 3     ferrous gluconate (FERGON) 324 (38 Fe) MG tablet Take 1 tablet (324 mg) by mouth 3 times daily (with meals) 90 tablet 0     folic acid (FOLVITE) 1 MG tablet Take 1 tablet (1 mg) by mouth daily 100 tablet 3     furosemide (LASIX) 20 MG tablet TAKE 1/2 TABLET BY MOUTH EVERY DAY 45 tablet 3     hydrALAZINE (APRESOLINE) 25 MG tablet Take 0.5 tablets (12.5 mg) by mouth 3 times daily as needed , if systolic blood pressure is more than 140 mmHg. 270 tablet 3     ketoconazole (NIZORAL) 2 % cream Apply  topically 2 times daily To angles of mouth 15 g 0     levothyroxine (SYNTHROID) 150 MCG tablet Take 225 mcg by mouth on Mondays       levothyroxine (SYNTHROID/LEVOTHROID) 150 MCG tablet TAKE 1.5 TABLETS BY MOUTH ON MONDAYS. AND 1 TABLET DAILY, THE OTHER DAYS OF THE WEEK. 102 tablet 3     meclizine (ANTIVERT) 25 MG tablet Take 1 tablet (25 mg) by mouth as needed 30 tablet 11     metoprolol succinate (TOPROL-XL) 50 MG 24 hr tablet Take 1 tablet (50 mg) by mouth daily 90 tablet 3     metroNIDAZOLE (METROCREAM) 0.75 % cream Apply topically 2 times daily 45 g 5     Multiple Vitamins-Minerals (PRESERVISION AREDS 2) CAPS Take 1 capsule by mouth 2 times daily       nitroFURantoin, macrocrystal-monohydrate, (MACROBID) 100 MG capsule Take 1 capsule (100 mg) by mouth 2 times daily For one week at onset of UTI symptoms 14 capsule 6     omega-3 acid ethyl esters (LOVAZA) 1 g capsule Take 1 capsule (1 g) by mouth 2 times daily 360 capsule 3     predniSONE (DELTASONE) 5 MG tablet Take 1 tablet (5 mg) by mouth daily 90 tablet 3     RAPAMUNE (BRAND) 1 MG PO TABLET Take 1 tablet (1 mg) by mouth every other day 45 tablet 3     sertraline (ZOLOFT) 100 MG tablet Take 1 tab (100mg) PO daily 90 tablet 1     SUMAtriptan (IMITREX) 50 MG tablet Take 1 tablet (50 mg) by mouth at onset of headache for migraine repeat after 2 hours if needed. 30 tablet 3     triamcinolone (KENALOG) 0.1 % cream Apply topically 2 times daily as needed for irritation       ursodiol (ACTIGALL) 250 MG tablet Take 1 tablet (250 mg) by mouth 2 times daily 180 tablet 3     voriconazole (VFEND) 200 MG tablet TAKE ONE TABLET BY MOUTH TWICE DAILY  90 tablet 3     Wheat Dextrin (BENEFIBER) POWD 2 teaspoons daily       clindamycin (CLEOCIN) 300 MG capsule Take 1 capsule (300 mg) by mouth 3 times daily (Patient not taking: Reported on 10/25/2018) 30 capsule 0     Hypromellose (ARTIFICIAL TEARS OP) Apply 1 drop to eye as needed       neomycin-polymyxin-hydrocortisone  "(CORTISPORIN) 3.5-04571-7 otic suspension Place 4 drops Into the left ear 4 times daily (Patient not taking: Reported on 10/25/2018) 10 mL 0     Allergies   Allergen Reactions     Fluconazole Hives and Itching     Ciprofloxacin Anxiety and Other (See Comments)     Irregular heart beat     Azithromycin Itching     Benadryl [Diphenhydramine Hcl]      Insomnia      Cellcept Diarrhea     Ciprofloxacin Other (See Comments)     Insomnia, mood lability     Codeine      Psych disturbance     Codeine      Diphenhydramine Other (See Comments)     Doxycycline      Lansoprazole Diarrhea     Levaquin [Levofloxacin] Other (See Comments)     Headache, hyperactivity     Lisinopril Cough     Methotrexate      Sores     Methotrexate      Morphine Sulfate Itching     Mycophenolate Diarrhea     No Clinical Screening - See Comments      Simvastatin Muscle Pain (Myalgia)     severe     Cephalexin Rash     Fever and skin burning     Penicillin G Itching and Rash     Tolectin [Nsaids] Rash     Tramadol Rash       Reviewed and updated as needed this visit by clinical staff  Tobacco  Allergies  Meds  Problems  Med Hx  Surg Hx  Fam Hx  Soc Hx        Reviewed and updated as needed this visit by Provider  Allergies  Meds  Problems         ROS:  Constitutional, HEENT, cardiovascular, pulmonary, GI, , musculoskeletal, neuro, skin, endocrine and psych systems are negative, except as otherwise noted.    OBJECTIVE:     /61 (BP Location: Right arm, Patient Position: Sitting, Cuff Size: Adult Large)  Pulse 72  Temp 98.4  F (36.9  C) (Oral)  Resp 20  Ht 5' 3\" (1.6 m)  Wt 179 lb 12.8 oz (81.6 kg)  LMP 06/01/1988 (Approximate)  SpO2 97%  BMI 31.85 kg/m2  Body mass index is 31.85 kg/(m^2).  GENERAL: healthy, alert and no distress  SKIN: papular and crusted over lesions around the mouth, chin, nasal corners    Diagnostic Test Results:  none     ASSESSMENT/PLAN:       ICD-10-CM    1. Perioral dermatitis L71.0 metroNIDAZOLE " (METROCREAM) 0.75 % cream   2. Asymptomatic postmenopausal status Z78.0 DEXA HIP/PELVIS/SPINE - Future     Use Metronidazole cream twice a day for 6-8 weeks , right for 2 weeks past resolution      Suni Cai PA-C  [facility]

## 2018-10-25 NOTE — TELEPHONE ENCOUNTER
Prior Authorization Approval    Authorization Effective Date: 10/24/2018  Authorization Expiration Date: 4/4/2019  Medication: Praluent 75MG/ML Pen (APPROVED)  Approved Dose/Quantity: 2ML  Reference #: CMM KEY: [id number]   Insurance Company: [employer] - Phone [phone] Fax [phone]  Expected CoPay:       CoPay Card Available: No   Foundation Assistance Needed: PASS Program  Which Pharmacy is filling the prescription (Not needed for infusion/clinic administered): [facility] - [place], [place] - [facility] [address]  Pharmacy Notified: Yes  Patient Notified: Yes

## 2018-10-25 NOTE — MR AVS SNAPSHOT
After Visit Summary   10/25/2018    Luz Thompson    MRN: [id number]           Patient Information     Date Of Birth          1950        Visit Information        Provider Department      10/25/2018 8:40 AM Suni Cai PA-C [facility]        Today's Diagnoses     Perioral dermatitis    -  1    Asymptomatic postmenopausal status          Care Instructions    Use Metronidazole cream twice a day for 6-8 weeks           Follow-ups after your visit        Your next 10 appointments already scheduled     May 14, 2019 12:20 PM CDT   CT CHEST W/O CONTRAST with UCCT2   [facility] ([facility])    [address]  1st Floor  [place] [address] [phone]           How do I prepare for my exam? (Food and drink instructions) No Food and Drink Restrictions.  How do I prepare for my exam? (Other instructions) You do not need to do anything special to prepare for this exam. For a sinus scan: Use your nose spray (nasal decongestant spray) as directed.  What should I wear: Please wear loose clothing, such as a sweat suit or jogging clothes. Avoid snaps, zippers and other metal. We may ask you to undress and put on a hospital gown.  How long does the exam take: Most scans take less than 20 minutes.  What should I bring: Please bring any scans or X-rays taken at other hospitals, if similar tests were done. Also bring a list of your medicines, including vitamins, minerals and over-the-counter drugs. It is safest to leave personal items at home.  Do I need a : No  is needed.  What do I need to tell my doctor? Be sure to tell your doctor: * If you have any allergies. * If there s any chance you are pregnant. * If you are breastfeeding.  What should I do after the exam: No restrictions, You may resume normal activities.  What is this test: A CT (computed tomography) scan is a series of pictures that allows us to  look inside your body. The scanner creates images of the body in cross sections, much like slices of bread. This helps us see any problems more clearly.  Who should I call with questions: If you have any questions, please call the Imaging Department where you will have your exam. Directions, parking instructions, and other information is available on our website, [email or web address].            May 14, 2019 12:40 PM CDT   (Arrive by 12:25 PM)   Return Visit with LIZ Bush   [facility] ([facility])    [address]  [address]  [place] [address]   [phone]            May 15, 2019 10:30 AM CDT   (Arrive by 10:15 AM)   Return Visit with Crystal Montero MD   [facility] ([facility])    [address]  [address]  [place] [address]   [phone]            May 22, 2019  1:30 PM CDT   Diabetes Education with Trisha Timmons RD   [facility] [place],  Diabetes Education ([facility])    [facility] [address]  [address]  [place] [address]   [phone]              Future tests that were ordered for you today     Open Future Orders        Priority Expected Expires Ordered    DEXA HIP/PELVIS/SPINE - Future Routine  10/25/2019 10/25/2018            Who to contact     If you have questions or need follow up information about today's clinic visit or your schedule please contact [facility] directly at [phone].  Normal or non-critical lab and imaging results will be communicated to you by MyChart, letter or phone within 4 business days after the clinic has received the results. If you do not hear from us within 7 days, please contact the clinic through MyChart or phone. If you have a critical or abnormal lab result, we will notify you by phone as soon as possible.  Submit refill requests  "through [employer] or call your pharmacy and they will forward the refill request to us. Please allow 3 business days for your refill to be completed.          Additional Information About Your Visit        [employer] Information     [employer] gives you secure access to your electronic health record. If you see a primary care provider, you can also send messages to your care team and make appointments. If you have questions, please call your primary care clinic.  If you do not have a primary care provider, please call [phone] and they will assist you.        Care EveryWhere ID     This is your Care EveryWhere ID. This could be used by other organizations to access your [place] medical records  [id number]        Your Vitals Were     Pulse Temperature Respirations Height Last Period Pulse Oximetry    72 98.4  F (36.9  C) (Oral) 20 5' 3\" (1.6 m) 06/01/1988 (Approximate) 97%    BMI (Body Mass Index)                   31.85 kg/m2            Blood Pressure from Last 3 Encounters:   10/25/18 135/61   10/08/18 144/64   09/27/18 126/72    Weight from Last 3 Encounters:   10/25/18 179 lb 12.8 oz (81.6 kg)   10/08/18 175 lb (79.4 kg)   09/21/18 173 lb 6.4 oz (78.7 kg)                 Today's Medication Changes          These changes are accurate as of 10/25/18  9:19 AM.  If you have any questions, ask your nurse or doctor.               Start taking these medicines.        Dose/Directions    metroNIDAZOLE 0.75 % cream   Commonly known as:  METROCREAM   Used for:  Perioral dermatitis   Started by:  Suni Cai PA-C        Apply topically 2 times daily   Quantity:  45 g   Refills:  5            Where to get your medicines      These medications were sent to [facility] - [place], [place] - [address]  [address], [facility] [address]     Phone:  [phone]     metroNIDAZOLE 0.75 % cream                Primary Care Provider Office Phone # Fax #    Jennifer Barraza MD " [phone]       [address]  [place] [address]        Goals        Diet    Have 3 meals a day     Notes - Note created  8/22/2018  1:18 PM by Trisha Tmimons, AJ    Goal Statement: Have three meals per day, use the plate method to include 30-60 grams of carbohydrate at meals and 15-30 grams of carbohydrate at snacks. Include a lean source of protein, heart healthy fat and vegetables at meals.  Measure of Success: using above method majority of the time  Supportive Steps to Achieve: Plan ahead, meal plan, support from friend whom she lives with, read labels  Barriers: none  Strengths: motivated   Patient expressed understanding of goal: yes, verbalized             General    Being Active (pt-stated)     Notes - Note created  8/22/2018  1:21 PM by Trisha Timmons RD    Continue working with a  twice per week and walk on other days for a goal of 30 minutes.      Healthy Eating (pt-stated)     Notes - Note created  8/22/2018  1:20 PM by Trisha Timmons RD    Goal Statement: choose high fiber carbohydrate most of the time  Supportive Steps to Achieve: read labels and meal plan  Strengths: motivated and demonstrates understanding              Equal Access to Services     GENE NOWAK AH: Hadii brayan guo hadnieshao Somelecioali, waaxda luqadaha, qaybta kaalmada adeegyada, etta hardy. So [facility] [phone].    ATENCIÓN: Si habla español, tiene a jason disposición servicios gratuitos de asistencia lingüística. Llame al [phone].    We comply with applicable federal civil rights laws and [place] laws. We do not discriminate on the basis of race, color, national origin, age, disability, sex, sexual orientation, or gender identity.            Thank you!     Thank you for choosing [facility]  for your care. Our goal is always to provide you with excellent care. Hearing back from our patients is one way we can continue to improve our services. Please take  a few minutes to complete the written survey that you may receive in the mail after your visit with us. Thank you!             Your Updated Medication List - Protect others around you: Learn how to safely use, store and throw away your medicines at www.disposemymeds.org.          This list is accurate as of 10/25/18  9:19 AM.  Always use your most recent med list.                   Brand Name Dispense Instructions for use Diagnosis    acetaminophen 500 MG Caps      Take 1,000 mg by mouth nightly as needed Take 500-1,000 mg by mouth every 6 hours if needed.        alirocumab 75 MG/ML injectable pen    PRALUENT    6 mL    Inject 1 mL (75 mg) Subcutaneous every 14 days    Statin intolerance, Hyperlipidemia LDL goal <70       ARTIFICIAL TEARS OP      Apply 1 drop to eye as needed        BENEFIBER Powd      2 teaspoons daily        blood glucose monitoring meter device kit    no brand specified    1 kit    Use to test blood sugar 2times daily or as directed.    Type 2 diabetes mellitus with stage 3 chronic kidney disease, without long-term current use of insulin (H)       blood glucose monitoring test strip    no brand specified    200 each    Use to test blood sugar 2 times daily, before breakfast and before bedtime    Type 2 diabetes mellitus with stage 3 chronic kidney disease, without long-term current use of insulin (H)       calcitRIOL 0.5 MCG capsule    ROCALTROL    90 capsule    Take 1 capsule (0.5 mcg) by mouth daily    Postablative hypoparathyroidism       ciprofloxacin-dexamethasone otic suspension    CIPRODEX    5.6 mL    Place 4 drops Into the left ear three times a week    Other infective chronic otitis externa of left ear, Tympanic membrane perforation, left, Sensorineural hearing loss (SNHL) of both ears       clindamycin 300 MG capsule    CLEOCIN    30 capsule    Take 1 capsule (300 mg) by mouth 3 times daily    Wound abscess, initial encounter       clotrimazole 1 % cream    LOTRIMIN     Apply topically  2 times daily as needed Reported on 4/25/2017        ELIQUIS 2.5 MG tablet   Generic drug:  apixaban ANTICOAGULANT     180 tablet    Take 1 tablet (2.5 mg) by mouth 2 times daily    Paroxysmal atrial fibrillation (H)       estradiol 0.1 MG/GM cream    ESTRACE VAGINAL    42.5 g    Place 2 g vaginally twice a week    Atrophic vaginitis       ezetimibe 10 MG tablet    ZETIA    90 tablet    TAKE 1 TABLET BY MOUTH EVERY DAY OR AS DIRECTED BY DR WOLF    Hyperlipidemia LDL goal <70, Benign essential hypertension       ferrous gluconate 324 (38 Fe) MG tablet    FERGON    90 tablet    Take 1 tablet (324 mg) by mouth 3 times daily (with meals)    Liver replaced by transplant (H)       folic acid 1 MG tablet    FOLVITE    100 tablet    Take 1 tablet (1 mg) by mouth daily    Liver replaced by transplant (H)       furosemide 20 MG tablet    LASIX    45 tablet    TAKE 1/2 TABLET BY MOUTH EVERY DAY    CKD (chronic kidney disease) stage 3, GFR 30-59 ml/min (H), Liver replaced by transplant (H)       hydrALAZINE 25 MG tablet    APRESOLINE    270 tablet    Take 0.5 tablets (12.5 mg) by mouth 3 times daily as needed , if systolic blood pressure is more than 140 mmHg.    HTN (hypertension)       ketoconazole 2 % cream    NIZORAL    15 g    Apply topically 2 times daily To angles of mouth    Angular cheilitis       LOVAZA 1 g capsule   Generic drug:  omega-3 acid ethyl esters     360 capsule    Take 1 capsule (1 g) by mouth 2 times daily    Hypertriglyceridemia       meclizine 25 MG tablet    ANTIVERT    30 tablet    Take 1 tablet (25 mg) by mouth as needed    Dizziness       metoprolol succinate 50 MG 24 hr tablet    TOPROL-XL    90 tablet    Take 1 tablet (50 mg) by mouth daily    Paroxysmal atrial fibrillation (H)       metroNIDAZOLE 0.75 % cream    METROCREAM    45 g    Apply topically 2 times daily    Perioral dermatitis       neomycin-polymyxin-hydrocortisone 3.5-41318-7 otic suspension    CORTISPORIN    10 mL    Place 4 drops  Into the left ear 4 times daily    Acute swimmer's ear of left side       nitroFURantoin (macrocrystal-monohydrate) 100 MG capsule    MACROBID    14 capsule    Take 1 capsule (100 mg) by mouth 2 times daily For one week at onset of UTI symptoms    Urinary tract infection without hematuria, site unspecified       predniSONE 5 MG tablet    DELTASONE    90 tablet    Take 1 tablet (5 mg) by mouth daily    Thyroid cancer (H), Liver replaced by transplant (H)       PRESERVISION AREDS 2 Caps      Take 1 capsule by mouth 2 times daily        sertraline 100 MG tablet    ZOLOFT    90 tablet    Take 1 tab (100mg) PO daily    Adjustment disorder with depressed mood       sirolimus 1 MG Tabs tablet     45 tablet    Take 1 tablet (1 mg) by mouth every other day    Liver replaced by transplant (H)       SUMAtriptan 50 MG tablet    IMITREX    30 tablet    Take 1 tablet (50 mg) by mouth at onset of headache for migraine repeat after 2 hours if needed.    Migraine without aura and without status migrainosus, not intractable       * SYNTHROID 150 MCG tablet   Generic drug:  levothyroxine      Take 225 mcg by mouth on Mondays        * levothyroxine 150 MCG tablet    SYNTHROID/LEVOTHROID    102 tablet    TAKE 1.5 TABLETS BY MOUTH ON MONDAYS. AND 1 TABLET DAILY, THE OTHER DAYS OF THE WEEK.    Malignant neoplasm of thyroid gland (H)       triamcinolone 0.1 % cream    KENALOG     Apply topically 2 times daily as needed for irritation        ursodiol 250 MG tablet    ACTIGALL    180 tablet    Take 1 tablet (250 mg) by mouth 2 times daily    Liver replaced by transplant (H)       voriconazole 200 MG tablet    VFEND    90 tablet    TAKE ONE TABLET BY MOUTH TWICE DAILY    Coccidioidal pneumonitis (H)       * Notice:  This list has 2 medication(s) that are the same as other medications prescribed for you. Read the directions carefully, and ask your doctor or other care provider to review them with you.

## 2018-11-21 DIAGNOSIS — Z94.4 LIVER REPLACED BY TRANSPLANT (H): ICD-10-CM

## 2018-11-21 RX ORDER — FOLIC ACID 1 MG/1
1 TABLET ORAL DAILY
Qty: 100 TABLET | Refills: 3 | Status: ON HOLD | OUTPATIENT
Start: 2018-11-21 | End: 2019-08-31

## 2018-12-17 ENCOUNTER — MYC MEDICAL ADVICE (OUTPATIENT)
Dept: PSYCHIATRY | Facility: CLINIC | Age: 68
End: 2018-12-17

## 2018-12-17 ENCOUNTER — MYC MEDICAL ADVICE (OUTPATIENT)
Dept: OTOLARYNGOLOGY | Facility: CLINIC | Age: 68
End: 2018-12-17

## 2018-12-21 NOTE — TELEPHONE ENCOUNTER
Placed phone call to pt in an attempt to communicate directly regarding her current symptoms. Unable to reach, left her a VM and [facility]

## 2018-12-28 LAB
ALBUMIN SERPL-MCNC: 3.8 G/DL
ALBUMIN/GLOBULIN RATIO - QUEST: 1.1
ALP SERPL-CCNC: 227 U/L
ALT SERPL-CCNC: 19 U/L
ANION GAP SERPL CALCULATED.3IONS-SCNC: 12 MMOL/L
AST SERPL-CCNC: 20 U/L
BILIRUB SERPL-MCNC: 0.2 MG/DL
BUN SERPL-MCNC: 16 MG/DL
BUN/CREATININE RATIO: 13.6
CALCIUM SERPL-MCNC: 8.9 MG/DL
CHLORIDE SERPLBLD-SCNC: 99 MMOL/L
CHOLEST SERPL-MCNC: 225 MG/DL
CHOLEST/HDLC SERPL: 4.8 {RATIO}
CO2 SERPL-SCNC: 28 MMOL/L
CREAT SERPL-MCNC: 1.18 MG/DL
GFR SERPL CREATININE-BSD FRML MDRD: 47 ML/MIN/1.73M2
GLOBULIN: 3.6 G/DL
GLUCOSE SERPL-MCNC: 103 MG/DL (ref 70–99)
HDLC SERPL-MCNC: 47 MG/DL
LDLC SERPL CALC-MCNC: 110 MG/DL
NONHDLC SERPL-MCNC: 178 MG/DL
POTASSIUM SERPL-SCNC: 3.9 MMOL/L
PROT SERPL-MCNC: 7.3 G/DL
SODIUM SERPL-SCNC: 140 MMOL/L
TRIGL SERPL-MCNC: 339 MG/DL
VLDL CHOLESTEROL: 68 MG/DL

## 2019-01-10 ENCOUNTER — TELEPHONE (OUTPATIENT)
Dept: CARDIOLOGY | Facility: CLINIC | Age: 69
End: 2019-01-10

## 2019-01-10 DIAGNOSIS — Z78.9 STATIN INTOLERANCE: ICD-10-CM

## 2019-01-10 DIAGNOSIS — E78.5 HYPERLIPIDEMIA LDL GOAL <70: ICD-10-CM

## 2019-01-10 NOTE — TELEPHONE ENCOUNTER
Date: 1/10/2019    Time of Call: 10:49 AM     Diagnosis:  HLD, Hypertriglyceridemia     [ TORB ] Ordering provider: Dr Randell Reyna  Order:   - Increase Praluent to 150 mg q14d.     Order received by: Antionette Damon LPN     Follow-up/additional notes: Per result note below.  Order placed.  Pt contacted and results and recommendations discussed.  Pt verbalized understanding, agreed to current plan and denied any further questions.    Result Notes for Lipid panel reflex to direct LDL Fasting     Notes recorded by Randell Reyna MD on 12/31/2018 at 4:05 PM KYMBERLY Adan,    Please let gaston know lab results    Increase Praluent form 75 mg subcut every 2 weeks to 150 mg every 2 weeks    Thanks    DD

## 2019-02-06 ENCOUNTER — TELEPHONE (OUTPATIENT)
Dept: ENDOCRINOLOGY | Facility: CLINIC | Age: 69
End: 2019-02-06

## 2019-02-06 NOTE — TELEPHONE ENCOUNTER
Patient is wondering if standing thyroid labs could be sent to her (as she stays in [place] during the winter.) The ones she has are .    Please call patient to discuss.     Thanks,  Ciera

## 2019-02-26 DIAGNOSIS — N39.0 URINARY TRACT INFECTION: Primary | ICD-10-CM

## 2019-03-05 DIAGNOSIS — B38.2 COCCIDIOIDAL PNEUMONITIS (H): ICD-10-CM

## 2019-03-05 DIAGNOSIS — Z94.4 LIVER REPLACED BY TRANSPLANT (H): ICD-10-CM

## 2019-03-06 RX ORDER — URSODIOL 250 MG/1
TABLET, FILM COATED ORAL
Qty: 180 TABLET | Refills: 0 | Status: SHIPPED | OUTPATIENT
Start: 2019-03-06 | End: 2019-05-28

## 2019-03-06 RX ORDER — VORICONAZOLE 200 MG/1
TABLET, FILM COATED ORAL
Qty: 60 TABLET | Refills: 1 | Status: SHIPPED | OUTPATIENT
Start: 2019-03-06 | End: 2019-05-28

## 2019-04-25 NOTE — TELEPHONE ENCOUNTER
Central Prior Authorization Team   Phone: [phone]      Per call to [facility] this pa was extended and purulent is approved until 04/24/2020.

## 2019-05-10 NOTE — NURSING NOTE
"Chief Complaint   Patient presents with     RECHECK     Infection follow up       Initial /84  Pulse 74  Wt 83.6 kg (184 lb 6.4 oz)  SpO2 98%  BMI 28.45 kg/m2 Estimated body mass index is 28.45 kg/(m^2) as calculated from the following:    Height as of 8/14/17: 1.715 m (5' 7.5\").    Weight as of this encounter: 83.6 kg (184 lb 6.4 oz).  Medication Reconciliation: complete   MIRI DEE CMA      " No

## 2019-05-13 ENCOUNTER — PRE VISIT (OUTPATIENT)
Dept: UROLOGY | Facility: CLINIC | Age: 69
End: 2019-05-13

## 2019-05-13 NOTE — TELEPHONE ENCOUNTER
Reason for Visit: follow up symptom check    Diagnosis: rUTIs    Orders/Procedures/Records: n/a    Contact Patient: n/a    Rooming Requirements: UA dip / PVR      Edel Messina LPN  05/13/19  10:07 AM

## 2019-05-14 ENCOUNTER — OFFICE VISIT (OUTPATIENT)
Dept: PSYCHIATRY | Facility: CLINIC | Age: 69
End: 2019-05-14
Attending: CLINICAL NURSE SPECIALIST
Payer: MEDICARE

## 2019-05-14 VITALS
WEIGHT: 180.4 LBS | SYSTOLIC BLOOD PRESSURE: 139 MMHG | DIASTOLIC BLOOD PRESSURE: 61 MMHG | HEART RATE: 74 BPM | BODY MASS INDEX: 31.96 KG/M2

## 2019-05-14 DIAGNOSIS — F43.23 ADJUSTMENT DISORDER WITH MIXED ANXIETY AND DEPRESSED MOOD: Primary | ICD-10-CM

## 2019-05-14 PROCEDURE — G0463 HOSPITAL OUTPT CLINIC VISIT: HCPCS | Mod: ZF

## 2019-05-14 ASSESSMENT — PATIENT HEALTH QUESTIONNAIRE - PHQ9: SUM OF ALL RESPONSES TO PHQ QUESTIONS 1-9: 0

## 2019-05-14 ASSESSMENT — PAIN SCALES - GENERAL: PAINLEVEL: NO PAIN (0)

## 2019-05-14 NOTE — PROGRESS NOTES
Outpatient Psychiatry Progress Note     Provider: LIZ Nesbitt CNS  Date: 2019  Service:  Medication follow up with counseling.   Patient Identification: Luz Thompson  : 1950   MRN: [id number]    Luz Thompson is a 69 year old year old female who presents for ongoing psychiatric care.  Luz Thompson was last seen in clinic on 7/10/18.   At that time,    Assessment & Plan       Luz Thompson is seen today for follow up and reports she is feeling better than when taking Wellbutrin and except for waking during the night has not had side effects. Amount of time waking varies. Patient has sleep study scheduled as has been having different sleep problems for several years.      Diagnosis  Adjustment Disorder with Depressed Mood.     Plan:  Medication: Continue Sertraline 50mg  OTC Recommendations: none, discussed Mid Nite an OTC for sleep but due to possible interactions with current medications she will not try it  Lab Orders:  none  Referrals: none  Release of Information: none  Future Treatment Considerations:per symptoms, side effects  Return for Follow Up:1 year      ____________________________________________________________________________________________________________________________________________    2019  Today [place] reports she spent the fall and winter and returned to [place] the last week of April. She has to return to [place] to close up her house. After that she will move to [place] and live with a a male friend she met in [place]. She is finalizing the divorce while here.  She is not feeling good about this.  December was very difficult with the holidays. Is feeling much better now. Has been on sertraline at 100mg since at least late September.  At this time she would like to try tapering off sertraline since the divorce is final and she feels she has adjusted well.   Health has been pretty good. She was hospitalized a few times in [place]. Once due to needing IV  "antibotics for a UTI. The second time after she fell due to having the flu and passing out in the bathroom.  Side effects of medication include: none  Psychiatric Review of Systems:  Depression: In the last 2 weeks per PHQ-9 score:   PHQ-9 SCORE 5/14/2019   PHQ-9 Total Score -   PHQ-9 Total Score MyChart -   PHQ-9 Total Score 0       Anxiety : normal level  Sindy na   Psychosis  na.   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      Diverticulosis of sigmoid colon 12/21/2013     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     Received Rituxan during Summer of 2016     H/O esophageal varices      Hearing loss      Heart murmur 4/4/2012     History of DVT (deep vein thrombosis)      History of [place] fever      History of thyroid cancer 9/25/2012     Hyperlipidemia 4/10/2012    Says that she does not have it anymore, not on meds     Hyperlipidemia LDL goal <70      Hypertension goal BP (blood pressure) < 140/80 11/06/2013     Hypertriglyceridemia      Liver replaced by transplant (H) 10/17/2011    Dr. 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     Patient Active Problem List   Diagnosis     Bladder infection, chronic     Osteoarthritis of right knee     HDL deficiency     Advanced directives, counseling/discussion     Vitamin D deficiency     S/P tympanoplasty     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     Prophylactic antibiotic     High risk medication use     Statin intolerance     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     SNHL (sensorineural hearing loss)     Abnormal liver function tests     Liver replaced by transplant (H)     Type 2 diabetes, HbA1c goal < 7% (H)     Hyperlipidemia LDL goal <70     Hypertension goal BP (blood pressure) < 140/80     Postoperative hypothyroidism     Type 2 diabetes mellitus with stage 3 chronic kidney disease, without long-term current use of insulin (H)     Age-related osteoporosis without current pathological fracture     Perforation of tympanic membrane, left     Other infective chronic otitis externa of left ear     CKD (chronic kidney disease) stage 3, GFR 30-59 ml/min (H)       Allergies:   Allergies   Allergen Reactions     Fluconazole Hives and Itching     Ciprofloxacin Anxiety and Other (See Comments)     Irregular heart beat     Azithromycin Itching     Benadryl [Diphenhydramine Hcl]      Insomnia      Cellcept Diarrhea     Ciprofloxacin Other (See Comments)     Insomnia, mood lability     Codeine      Psych disturbance     Codeine      Diphenhydramine Other (See Comments)     Doxycycline      Lansoprazole Diarrhea     Levaquin [Levofloxacin] Other (See Comments)     Headache, hyperactivity     Lisinopril Cough     Methotrexate      Sores     Methotrexate      Morphine Sulfate Itching     Mycophenolate Diarrhea     No Clinical Screening - See  Comments      Simvastatin Muscle Pain (Myalgia)     severe     Cephalexin Rash     Fever and skin burning     Penicillin G Itching and Rash     Tolectin [Nsaids] Rash     Tramadol Rash          Current Medications     Current Outpatient Medications Ordered in Epic   Medication Sig Dispense Refill     acetaminophen 500 MG CAPS Take 1,000 mg by mouth nightly as needed Take 500-1,000 mg by mouth every 6 hours if needed.        alirocumab (PRALUENT) 150 MG/ML injectable pen Inject 1 mL (150 mg) Subcutaneous every 14 days 6 mL 3     blood glucose monitoring (NO BRAND SPECIFIED) meter device kit Use to test blood sugar 2times daily or as directed. 1 kit 0     blood glucose monitoring (NO BRAND SPECIFIED) test strip Use to test blood sugar 2 times daily, before breakfast and before bedtime 200 each 3     calcitRIOL (ROCALTROL) 0.5 MCG capsule Take 1 capsule (0.5 mcg) by mouth daily 90 capsule 3     ciprofloxacin-dexamethasone (CIPRODEX) otic suspension Place 4 drops Into the left ear three times a week 5.6 mL 6     clindamycin (CLEOCIN) 300 MG capsule Take 1 capsule (300 mg) by mouth 3 times daily (Patient not taking: Reported on 10/25/2018) 30 capsule 0     clotrimazole (LOTRIMIN) 1 % cream Apply topically 2 times daily as needed Reported on 4/25/2017       clotrimazole (LOTRIMIN) 1 % solution Apply 1 mL topically 2 times daily for 14 days Correct instruction - 4 drops LEFT ear twice daily for 14 days 28 mL 1     ELIQUIS 2.5 MG tablet Take 1 tablet (2.5 mg) by mouth 2 times daily 180 tablet 3     estradiol (ESTRACE VAGINAL) 0.1 MG/GM cream Place 2 g vaginally twice a week 42.5 g 11     ezetimibe (ZETIA) 10 MG tablet TAKE 1 TABLET BY MOUTH EVERY DAY OR AS DIRECTED BY DR WOLF 90 tablet 3     ferrous gluconate (FERGON) 324 (38 Fe) MG tablet Take 1 tablet (324 mg) by mouth 3 times daily (with meals) 90 tablet 0     folic acid (FOLVITE) 1 MG tablet Take 1 tablet (1 mg) by mouth daily 100 tablet 3     furosemide (LASIX) 20 MG  tablet TAKE 1/2 TABLET BY MOUTH EVERY DAY 45 tablet 3     hydrALAZINE (APRESOLINE) 25 MG tablet Take 0.5 tablets (12.5 mg) by mouth 3 times daily as needed , if systolic blood pressure is more than 140 mmHg. 270 tablet 3     Hypromellose (ARTIFICIAL TEARS OP) Apply 1 drop to eye as needed       ketoconazole (NIZORAL) 2 % cream Apply topically 2 times daily To angles of mouth 15 g 0     levothyroxine (SYNTHROID) 150 MCG tablet Take 225 mcg by mouth on Mondays       levothyroxine (SYNTHROID/LEVOTHROID) 150 MCG tablet TAKE 1.5 TABLETS BY MOUTH ON MONDAYS. AND 1 TABLET DAILY, THE OTHER DAYS OF THE WEEK. 102 tablet 3     meclizine (ANTIVERT) 25 MG tablet Take 1 tablet (25 mg) by mouth as needed 30 tablet 11     metoprolol succinate (TOPROL-XL) 50 MG 24 hr tablet Take 1 tablet (50 mg) by mouth daily 90 tablet 3     metroNIDAZOLE (METROCREAM) 0.75 % cream Apply topically 2 times daily 45 g 5     Multiple Vitamins-Minerals (PRESERVISION AREDS 2) CAPS Take 1 capsule by mouth 2 times daily       neomycin-polymyxin-hydrocortisone (CORTISPORIN) 3.5-94492-5 otic suspension Place 4 drops Into the left ear 4 times daily (Patient not taking: Reported on 10/25/2018) 10 mL 0     nitroFURantoin, macrocrystal-monohydrate, (MACROBID) 100 MG capsule Take 1 capsule (100 mg) by mouth 2 times daily For one week at onset of UTI symptoms 14 capsule 6     omega-3 acid ethyl esters (LOVAZA) 1 g capsule Take 1 capsule (1 g) by mouth 2 times daily 360 capsule 3     predniSONE (DELTASONE) 5 MG tablet Take 1 tablet (5 mg) by mouth daily 90 tablet 3     RAPAMUNE (BRAND) 1 MG PO TABLET Take 1 tablet (1 mg) by mouth every other day 45 tablet 3     sertraline (ZOLOFT) 100 MG tablet Take 1 tab (100mg) PO daily 90 tablet 0     SUMAtriptan (IMITREX) 50 MG tablet Take 1 tablet (50 mg) by mouth at onset of headache for migraine repeat after 2 hours if needed. 30 tablet 3     triamcinolone (KENALOG) 0.1 % cream Apply topically 2 times daily as needed for  "irritation       ursodiol (ACTIGALL) 250 MG tablet TAKE 1 TABLET BY MOUTH TWICE A  tablet 0     voriconazole (VFEND) 200 MG tablet TAKE 1 TABLET BY MOUTH TWICE DAILY  60 tablet 1     Wheat Dextrin (BENEFIBER) POWD 2 teaspoons daily       No current Epic-ordered facility-administered medications on file.         Mental Status Exam     Appearance:  Casually dressed and Well groomed  Behavior/relationship to examiner/demeanor:  Cooperative  Orientation: Oriented to person, place, time and situation  Psychomotor: normal form   Speech Rate:  Normal  Speech Spontaneity:  Normal  Mood:  \"good\"  Affect:  Appropriate/mood-congruent  Thought Process (Associations):  Goal directed  Thought Content:  no overt psychosis, denies suicidal ideation, intent or thoughts and patient does not appear to be responding to internal stimuli  Abnormal Perception:  None  Attention/Concentration:  Normal  Insight:  Good  Judgment:  Good      Results     Vital signs: Wt 81.8 kg (180 lb 6.4 oz)   LMP 06/01/1988 (Approximate)   BMI 31.96 kg/m      Laboratory Data:  no new data reviewed    Assessment & Plan      Luz Thompson is seen today for follow up and reports her mood has stablized and she feels in a good place at this time and would like to taper off of Sertraline.     Diagnosis  Encounter Diagnosis   Name Primary?     Adjustment disorder with mixed anxiety and depressed mood Yes       Plan:  Medication: Decrease Sertraline to 50mg for one month then discontinue. If needed due to withdrawal instead decrease by 25mg every 2 weeks and then discontinue  OTC Recommendations: none  Lab Orders:  none  Referrals: none  Release of Information: none  Future Treatment Considerations:if mood is not in remission  Return for Follow Up:not needed unless symptoms reoccur.    The risks, benefits, alternatives and side effects have been discussed and are understood by the patient. The patient understands the risks of using street drugs or " alcohol. There are no medical contraindications, the patient agrees to treatment, and has the capacity to do so. The patient understands to call 911 or come to the nearest ED if life threatening or urgent symptoms present.  In addition time was spent counseling the patient and/or coordinating care regarding review of social and occupational functioning.  In addition patient was counseled on health and wellness practices to augment medication treatment of symptoms. See note for details.    Luzma Mercer, APRN CNS 5/14/2019

## 2019-05-15 ENCOUNTER — OFFICE VISIT (OUTPATIENT)
Dept: INFECTIOUS DISEASES | Facility: CLINIC | Age: 69
End: 2019-05-15
Attending: INTERNAL MEDICINE
Payer: MEDICARE

## 2019-05-15 VITALS
HEART RATE: 64 BPM | TEMPERATURE: 98.1 F | SYSTOLIC BLOOD PRESSURE: 145 MMHG | OXYGEN SATURATION: 98 % | DIASTOLIC BLOOD PRESSURE: 73 MMHG | RESPIRATION RATE: 18 BRPM

## 2019-05-15 DIAGNOSIS — N18.30 TYPE 2 DIABETES MELLITUS WITH STAGE 3 CHRONIC KIDNEY DISEASE, WITHOUT LONG-TERM CURRENT USE OF INSULIN (H): ICD-10-CM

## 2019-05-15 DIAGNOSIS — Z22.39 VRE CARRIER: ICD-10-CM

## 2019-05-15 DIAGNOSIS — B38.2 COCCIDIOIDAL PNEUMONITIS (H): Primary | ICD-10-CM

## 2019-05-15 DIAGNOSIS — E11.22 TYPE 2 DIABETES MELLITUS WITH STAGE 3 CHRONIC KIDNEY DISEASE, WITHOUT LONG-TERM CURRENT USE OF INSULIN (H): ICD-10-CM

## 2019-05-15 DIAGNOSIS — Z94.4 LIVER REPLACED BY TRANSPLANT (H): ICD-10-CM

## 2019-05-15 DIAGNOSIS — B27.00 EBV (EPSTEIN-BARR VIRUS) VIREMIA: ICD-10-CM

## 2019-05-15 DIAGNOSIS — Z79.899 HIGH RISK MEDICATION USE: ICD-10-CM

## 2019-05-15 PROCEDURE — G0463 HOSPITAL OUTPT CLINIC VISIT: HCPCS | Mod: ZF

## 2019-05-15 ASSESSMENT — PAIN SCALES - GENERAL: PAINLEVEL: NO PAIN (0)

## 2019-05-15 NOTE — LETTER
5/15/2019     RE: Luz Thompson  [address]  [place] [address]     Dear Colleague,    Thank you for referring your patient, Luz Thompson, to the [facility] AND INFECTIOUS DISEASES at [facility]. Please see a copy of my visit note below.    [facility]  Transplant Infectious Disease Clinic Note     Patient:  Luz Thompson, Date of birth 1950, Medical record number [id number]  Date of Visit:  05/15/2019         Assessment and Recommendations:   Recommendations:  - Continue voriconazole through result reporting of 5/21/2019 CT scan. Prior auth for vori refills will be completed as needed.   - Follow-up on 5/21/2019 Chest CT. If stable, will consider changing vori to posa (with a prior auth), so that there is no interruption in pill supplies.   - Nephrology referral.   - Liver Transplant labs next week to include inflammatory markers, liver testing and voriconazole level. Orders placed.   - Return to clinic upon [place], ~ 8 months from now.    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 She gets recurrent gram negative sepsis. She has known sigmoid diverticulosis. She gets episodes of defecation syncope.   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 Followup CT imaging 6/27/2018 shows no worsening of the nodularities over a 1-yr interval; we reviewed this scan together in clinic.  She continues on vori for cocci, and she's had some dry skin and areas of frequent picking of skin since she started vori. Continue voriconazole. Prior auth for vori refills will be completed as needed. Transplant labs next week to include inflammatory markers, liver testing and voriconazole level. Follow-up Chest CT  scheduled for 5/21/2019. My concern with long term vori is her sun exposure in [place], as well as the elevated skin cancer risk in transplant populations that take vori over a long period of time. If 5/21/2019 CT scan stable, will consider changing vori to posa (with a prior auth), so that there is no interruption in pill supplies.   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 She had some side effects in the days following the dose of Rituxan, but otherwise would be able to tolerate it again in the future. Will check EBV level with next labs.   - Left otitis. On ciprodex drops prn. Followed in ENT clinic due to perforation.   - Self-triggered use of prn antibiotics due to recurrent episodes of sepsis. She gets a lot of itching with vantin, and quinolones and macrolides don't seem to work that well for her, so her current prn med is bactrim. If the bactrim does not do the job in controlling fever, because she has allergies to penicillins and quinolones (cipro and levaquin), she knows to be seen somewhere where she could be evaluated for a once daily infusion of ertapenem (also called Invanz).   - Hx of E coli pyelonephritis in 3/2019, hospitalized twice in [place].   - Hx of bacterial superinfection of chronic venous stasis changes on the legs, 7/27/2016.  - Hx of recurrent E coli sepsis 8/13/2013, 6/10/2015.   - Hx of Klebsiella UTI, 7/18/16. She completed a 14-day course of macrobid.  - Hx of Haemophilus influenzae pneumonia in 9/2014.  - Hx of angular chelitis, hx of thrush extending from under her upper denture plate, and onychomycosis.   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   - QTc interval 5/18/2018 457 msec.   - PCP prophylaxis: Not needed, as most recent CD4 count was > 200.   - Serostatus: CMV seronegative, EBV seropositive on 8/15/13.  - Immunization status: Completed PCV13 and PPSV23 x2 with last dose in 5/2016.  - Gamma globulin status: Endogenously replete.  - Isolation status: Good hand hygience. When she is  "an inpatient, she needs to be in contact isolation for known VRE carriage.    Crystal Montero MD. Pager [phone]         History of Infectious Disease Illness:   Since Virginia was last seen in ID clinic 7/25/2018, she is taking vori for cocci. She thinks she has no side effects from vori. She is living in a \"park model\" in [place]. She had a bad E coli pyelonephritis in 3/2019, hospitalized twice in [place], although the second admission was more for weakness and syncope than for infection. Records reviewed together with Virginia, using [facility] Everywhere. No labs since the hospital time in 3/2019, planning to get labs next week when she sees her transplant hepatologist Dr. Ramirez. Planning to switch to [facility] for future rx issues. Her endocrinologist thought she should have a nephrologist, since she has diabetes and Cr elevated, but she has not starting looking for a nephrologist. She is  from her  of 39 years. She spends the winter in [place], [place], currently in [place].     Transplants:  5/22/2002 (Liver), Postoperative day:  6202         Review of Systems:   CONSTITUTIONAL:  No fever, chills, night sweats.  EYES: negative for icterus. Vision good with treatment of cataract and glaucoma issues.   ENT:  She wears hearing aids. She takes her dentures out at night. No sore throat.   RESPIRATORY:  No cough, no sputum, but sometimes she has dyspnea.   CARDIOVASCULAR:  negative for chest pain, no heart palpitations  GASTROINTESTINAL:  negative for nausea, vomiting, diarrhea, constipation.  GENITOURINARY:  No dysuria or hematuria  HEME:  Normal amount of easy bruising, + easy bleeding.   INTEGUMENT:  She has her normal amount of itching from dry skin. No new rash.   NEURO:  No headache. No tremor.     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      Diverticulosis of sigmoid colon 12/21/2013     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     Received Rituxan during Summer of 2016     H/O esophageal varices      Hearing loss      Heart murmur 4/4/2012     History of DVT (deep vein thrombosis)      History of [facility] fever      History of thyroid cancer 9/25/2012     Hyperlipidemia 4/10/2012    Says that she does not have it anymore, not on meds     Hyperlipidemia LDL goal <70      Hypertension goal BP (blood pressure) < 140/80 11/06/2013     Hypertriglyceridemia      Liver replaced by transplant (H) 10/17/2011    Dr. 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       Past Surgical History:   Procedure Laterality Date     APPENDECTOMY  1961     BIOPSY       CATARACT IOL, RT/LT      RE12/19/2013, LE12/10/2013 - Toric lenses     CHOLECYSTECTOMY  1991     COLONOSCOPY  3/10/2014    Procedure: COLONOSCOPY;;  Surgeon: Gentry Ramirez MD;  Location:  GI     ear drum repair       ENDOBRONCHIAL ULTRASOUND FLEXIBLE N/A 9/29/2017    Procedure: ENDOBRONCHIAL ULTRASOUND FLEXIBLE;  Flexible Bronchoscopy, Endobronchial Ultrasound, Transbronchial Needle Aspiration ;  Surgeon: Eden Clinton MD;  Location:  OR     ENDOSCOPIC RETROGRADE CHOLANGIOPANCREATOGRAM "  2013    Procedure: ENDOSCOPIC RETROGRADE CHOLANGIOPANCREATOGRAM;  Endoscopic Retrograde Cholangiopancreatogram with single balloon enteroscopy, ballon sweep of bile duct;  Surgeon: Brett Membreno MD;  Location: [facility] OR     HC KNEE SCOPE,MED/LAT MENISECTOMY  8/10/12    Right, partial medial menisectomy only     KNEE SURGERY  1966    R knee     PICC INSERTION  2013    4fr SL PASV PICC, 40cm (1cm external) in the R basilic vein w/ tip in the low SVC     PICC INSERTION  2014    5 fr DL BioFlo Navilyst PICC, 46 cm (3 cm external) in the L basilic vein w/ tip in the SVC RA junction.     THYROIDECTOMY  3/2010     TRANSPLANT LIVER RECIPIENT LIVING UNRELATED         Family History   Problem Relation Age of Onset     Hypertension Mother      Endometrial Cancer Mother      Hyperlipidemia Mother      Prostate Cancer Father      Macular Degeneration Father      Cancer - colorectal Maternal Grandmother         in her 80's, has surgery and removal of part of kidney,  at age 98     Heart Disease Maternal Grandfather          at 98     Glaucoma Maternal Grandfather      Cerebrovascular Disease Paternal Grandmother         in her 80's     Hypertension Paternal Grandmother      Heart Disease Paternal Grandfather         MI     Alzheimer Disease Paternal Grandfather      Allergies Son      Neurologic Disorder Daughter         Migraines     Breast Cancer Other      Anesthesia Reaction No family hx of      Crohn's Disease No family hx of      Ulcerative Colitis No family hx of        Social History     Social History Narrative    She is retired. [place]. 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     Social History     Tobacco Use     Smoking status: Former Smoker     Packs/day: 1.00     Years: 18.00     Pack years: 18.00     Types: Cigarettes     Last attempt to quit: 1985     Years  "since quittin.1     Smokeless tobacco: Never Used   Substance Use Topics     Alcohol use: Yes     Alcohol/week: 0.0 oz     Comment: rare - \"I toast at weddings\"     Drug use: No       Immunization History   Administered Date(s) Administered     [id number] Novel Flu P-free 11/10/2009     Influenza (High Dose) 3 valent vaccine 2015, 10/30/2016, 10/23/2017     Influenza (IIV3) PF 11/10/2004, 2007, 10/01/2010, 2012, 10/29/2012, 2013, 2016     Influenza Quad, Recombinant, p-free (RIV4) 10/20/2018     Pneumo Conj 13-V (2010&after) 2014     Pneumococcal 23 valent 2007, 2016     TD (ADULT, 7+) 2007     TDAP Vaccine (Adacel) 2007, 2014       Patient Active Problem List   Diagnosis     Bladder infection, chronic     Osteoarthritis of right knee     HDL deficiency     Advanced directives, counseling/discussion     Vitamin D deficiency     S/P tympanoplasty     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     Prophylactic antibiotic     High risk medication use     Statin intolerance     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     SNHL (sensorineural hearing loss)     Abnormal liver function tests     Liver replaced by transplant (H)     Type 2 diabetes, HbA1c goal < 7% (H)     Hyperlipidemia LDL goal <70     Hypertension goal BP (blood pressure) < 140/80     Postoperative hypothyroidism     Type 2 diabetes mellitus with stage 3 chronic kidney disease, without long-term current use of insulin (H)     Age-related osteoporosis without current pathological fracture     Perforation of tympanic membrane, left     Other infective chronic otitis externa of left ear     CKD (chronic kidney disease) stage 3, GFR 30-59 ml/min (H)       Outpatient Medications Marked as Taking for the 5/15/19 encounter (Office Visit) with Crystal Montero MD   Medication Sig     acetaminophen 500 MG CAPS Take 1,000 mg by mouth nightly as needed Take 500-1,000 mg by mouth every 6 hours if needed.      alirocumab (PRALUENT) " 150 MG/ML injectable pen Inject 1 mL (150 mg) Subcutaneous every 14 days     blood glucose monitoring (NO BRAND SPECIFIED) meter device kit Use to test blood sugar 2times daily or as directed.     blood glucose monitoring (NO BRAND SPECIFIED) test strip Use to test blood sugar 2 times daily, before breakfast and before bedtime     calcitRIOL (ROCALTROL) 0.5 MCG capsule Take 1 capsule (0.5 mcg) by mouth daily     ciprofloxacin-dexamethasone (CIPRODEX) otic suspension Place 4 drops Into the left ear three times a week     ELIQUIS 2.5 MG tablet Take 1 tablet (2.5 mg) by mouth 2 times daily     estradiol (ESTRACE VAGINAL) 0.1 MG/GM cream Place 2 g vaginally twice a week     ezetimibe (ZETIA) 10 MG tablet TAKE 1 TABLET BY MOUTH EVERY DAY OR AS DIRECTED BY DR WOLF     ferrous gluconate (FERGON) 324 (38 Fe) MG tablet Take 1 tablet (324 mg) by mouth 3 times daily (with meals)     folic acid (FOLVITE) 1 MG tablet Take 1 tablet (1 mg) by mouth daily     furosemide (LASIX) 20 MG tablet TAKE 1/2 TABLET BY MOUTH EVERY DAY     hydrALAZINE (APRESOLINE) 25 MG tablet Take 0.5 tablets (12.5 mg) by mouth 3 times daily as needed , if systolic blood pressure is more than 140 mmHg.     Hypromellose (ARTIFICIAL TEARS OP) Apply 1 drop to eye as needed     ketoconazole (NIZORAL) 2 % cream Apply topically 2 times daily To angles of mouth     levothyroxine (SYNTHROID/LEVOTHROID) 150 MCG tablet TAKE 1.5 TABLETS BY MOUTH ON MONDAYS. AND 1 TABLET DAILY, THE OTHER DAYS OF THE WEEK.     meclizine (ANTIVERT) 25 MG tablet Take 1 tablet (25 mg) by mouth as needed     metoprolol succinate (TOPROL-XL) 50 MG 24 hr tablet Take 1 tablet (50 mg) by mouth daily     metroNIDAZOLE (METROCREAM) 0.75 % cream Apply topically 2 times daily     Multiple Vitamins-Minerals (PRESERVISION AREDS 2) CAPS Take 1 capsule by mouth 2 times daily     omega-3 acid ethyl esters (LOVAZA) 1 g capsule Take 1 capsule (1 g) by mouth 2 times daily     predniSONE (DELTASONE) 5 MG  tablet Take 1 tablet (5 mg) by mouth daily     RAPAMUNE (BRAND) 1 MG PO TABLET Take 1 tablet (1 mg) by mouth every other day     sertraline (ZOLOFT) 100 MG tablet Take 1 tab (100mg) PO daily (Patient taking differently: Take 50 mg by mouth daily )     SUMAtriptan (IMITREX) 50 MG tablet Take 1 tablet (50 mg) by mouth at onset of headache for migraine repeat after 2 hours if needed.     triamcinolone (KENALOG) 0.1 % cream Apply topically 2 times daily as needed for irritation       Allergies   Allergen Reactions     Fluconazole Hives and Itching     Ciprofloxacin Anxiety and Other (See Comments)     Irregular heart beat     Azithromycin Itching     Benadryl [Diphenhydramine Hcl]      Insomnia      Cellcept Diarrhea     Ciprofloxacin Other (See Comments)     Insomnia, mood lability     Codeine      Psych disturbance     Codeine      Diphenhydramine Other (See Comments)     Doxycycline      Lansoprazole Diarrhea     Levaquin [Levofloxacin] Other (See Comments)     Headache, hyperactivity     Lisinopril Cough     Methotrexate      Sores     Methotrexate      Morphine Sulfate Itching     Mycophenolate Diarrhea     No Clinical Screening - See Comments      Simvastatin Muscle Pain (Myalgia)     severe     Cephalexin Rash     Fever and skin burning     Penicillin G Itching and Rash     Tolectin [Nsaids] Rash     Tramadol Rash            Physical Exam:   Vitals were reviewed.  All vitals stable.  LMP 06/01/1988 (Approximate)    Wt Readings from Last 4 Encounters:   10/25/18 81.6 kg (179 lb 12.8 oz)   10/08/18 79.4 kg (175 lb)   09/21/18 78.7 kg (173 lb 6.4 oz)   09/12/18 78.6 kg (173 lb 5 oz)       Physical Examination:  GENERAL:  well-developed, well-nourished woman, in no acute distress.  HEENT:  Head is normocephalic, atraumatic   EYES:  Eyes have anicteric sclerae.   ENT: Hearing aids in place today. Dentures in place, no thrush.   NECK:  Supple.   LUNGS: Clear to auscultation bilateral.   CARDIOVASCULAR: Regular rate and  rhythm with 2/6 systolic murmur heard best at RUSB but no gallops or rubs.   ABDOMEN: Normal bowel sounds, not tender. Subcostal surgical scar not otherwise examined.  SKIN:  No acute rash. She is wearing black compression stockings. Onychomycosis not examined today.   NEUROLOGIC:  Grossly nonfocal. Active x4 extremities         Laboratory Data:     Absolute CD4   Date Value Ref Range Status   08/19/2014 415 mm3 Final   09/16/2013 1,266 mm3 Final   09/15/2013 DUPLICATE,TESTING DONE ON SPECIMEN FROM 9.16.13 mm3 Final   11/13/2008 1332 mm3 Final       Inflammatory Markers    Recent Labs   Lab Test 07/30/18  0855 09/03/17  0912 09/02/17  0648 09/01/17  0832 05/14/16  0659 05/13/16  0940 09/23/14  0810 08/19/14  1530  06/30/14  0825 05/15/14  1112 05/08/14  0806 03/04/14  1315   SED 73*  --   --   --   --  67* 79* 76*  --  51* 58* 51* 55*   CRP 5.2 64.0* 71.0* 51.0* 21.0* 19.0* 6.1 29.8*   < > 12.5* 8.0 <5.0 5.5    < > = values in this interval not displayed.       Immune Globulin Studies    Recent Labs   Lab Test 08/19/14  1530 05/21/12  0754   IGG 1,220 1070   IGM  --  97   IGE 46  --    IGA  --  85   IGG1 684  --    IGG2 441  --    IGG3 70  --    IGG4 19  --        Metabolic Studies       Recent Labs   Lab Test 12/28/18 10/16/18  0905 10/16/18  0902 07/30/18  0913 05/19/18  0629 05/18/18  0731  05/14/18  1019 04/17/18  0942  09/18/17  0921  05/10/17  0929  05/24/16  0846  05/13/16  0940  07/23/14  1119     --  138 140 136 137  --  133 138   < > 137   < > 138   < > 135   < > 132*   < > 137   POTASSIUM 3.9  --  3.8 3.3* 4.1 3.6  --  3.3* 3.9   < > 5.0   < > 4.2   < > 3.6   < > 3.6   < > 3.8   CHLORIDE 99  --  102 102 103 100  --  100 102   < > 99   < > 102   < > 99   < > 96   < > 98   CO2 28  --  27 27 27 27  --  27 28   < > 32   < > 26   < > 27   < > 26   < > 27   ANIONGAP 12  --  9 11 6 10  --  6 8   < > 6   < > 10   < > 9   < > 10   < > 12   BUN 16  13.6  --  26 26 20 20  --  23 36*   < > 36*   < > 36*   < >  28   < > 29   < > 35*   CR 1.18  --  1.19* 1.11* 1.29* 1.22*  --  1.40* 1.74*   < > 1.26*   < > 1.53*   < > 1.77*   < > 1.64*   < > 1.70*   GFRESTIMATED 47*  --  45* 49* 41* 44*  --  37* 29*   < > 42*   < > 34*   < > 29*   < > 31*   < > 30*   *  --  102* 79 103* 119*  --  124* 125*   < > 108*   < > 104*   < > 94   < > 98   < > 109*   A1C  --  6.4*  --   --   --  6.8*   < >  --   --   --   --   --   --   --   --   --   --   --   --    KEILY 8.9  --  8.9 8.7 8.5 8.6  --  9.0 9.1   < > 9.6   < > 9.7   < > 8.8   < > 9.1   < > 9.1   PHOS  --   --   --   --   --   --   --   --   --   --   --   --  3.1  --  3.2  --   --    < >  --    MAG  --   --   --   --   --   --   --   --  2.8*  --  2.2   < > 2.8*   < > 2.3  --  2.2   < >  --    LACT  --   --   --   --   --   --   --   --   --   --   --   --   --   --   --   --  0.8  --  1.7    < > = values in this interval not displayed.       Hepatic Studies    Recent Labs   Lab Test 12/28/18 10/16/18  0902 07/30/18  0913 05/14/18  1019 04/17/18  0942 10/23/17  1024   BILITOTAL 0.2 0.2 0.2 0.2 0.3 0.3   ALKPHOS 227* 238* 276* 240* 211* 222*   ALBUMIN 3.8 3.0* 2.8* 3.1* 3.3* 3.2*   AST 20 19 25 23 26 23   ALT 19 23 37 43 42 47       Lipid testing    Recent Labs   Lab Test 12/28/18 10/16/18  0902 05/18/18  0731  09/18/15  0954   CHOL 225* 300* 265*   < > 181   HDL 47 52 49*   < > 63    194* Cannot estimate LDL when triglyceride exceeds 400 mg/dL  150*   < > 84   TRIG 339* 269* 557*   < > 172*   CHOLHDLRATIO 4.8*  --   --   --  2.9    < > = values in this interval not displayed.       Gout Labs      Recent Labs   Lab Test 12/31/13  1443 07/30/13  1441   URIC 6.7 6.7       Hematology Studies      Recent Labs   Lab Test 10/16/18  0902 07/30/18  0913 05/19/18  0629 05/18/18  0731 05/14/18  1019 04/17/18  0942  09/02/17  0648 09/01/17  0832 08/31/17  1306  05/16/16  0827  05/14/16  0659   WBC 7.2 7.4 6.1 8.0 8.6 8.3   < > 10.2 9.4 10.6   < > 5.7   < > 4.7   ANEU  --   --   --   5.0  --   --   --  7.4 6.5 8.6*  --  2.5  --  2.1   ALYM  --   --   --  1.9  --   --   --  1.7 1.7 1.3  --  2.4  --  1.6   KATHY  --   --   --  1.0  --   --   --  0.9 1.0 0.7  --  0.6  --  0.9   AEOS  --   --   --  0.1  --   --   --  0.2 0.1 0.0  --  0.1  --  0.1   HGB 10.3* 10.7* 10.3* 11.6* 11.5* 11.9   < > 10.5* 11.1* 12.4   < > 11.1*   < > 10.0*   HCT 33.1* 33.8* 33.0* 36.0 36.1 37.7   < > 34.0* 35.0 38.5   < > 35.4   < > 31.8*    327 302 324 326 348   < > 259 269 290   < > 256   < > 230    < > = values in this interval not displayed.       Iron Testing    Recent Labs   Lab Test 10/16/18  0902  07/30/18  0855  07/11/13  0814  04/18/13  0809 12/22/12  1346  12/20/12 2005 12/07/12  1345   IRON  --   --  39  --  58  --  63   < >  --   --   --  48   FEB  --   --  307  --  285  --  315   < >  --   --   --  156*   IRONSAT  --   --  13*  --  20  --  20   < >  --   --   --  30   LAURA  --   --  18   < > 195   < > 232   < >  --   --   --  317*   MCV 88   < >  --    < > 88   < > 92   < > 100   < > 103*  --    B12  --   --   --   --   --   --   --   --   --   --   --  281   [id number]  --   --   --   --   --   --   --   --   --   --   --  272*   HAPT  --   --   --   --   --   --   --   --   --   --  137  --    RETP  --   --   --   --   --   --   --   --  2.7*  --   --   --    RETICABSCT  --   --   --   --   --   --   --   --  71.0  --   --   --     < > = values in this interval not displayed.       Clotting Studies    Recent Labs   Lab Test 05/18/18  0731 05/16/16  0827 05/13/16  0940 11/06/13  1246  06/19/11  1815   INR 1.06 1.02 1.11 0.96   < > 1.08   PTT 33  --  33  --   --  28    < > = values in this interval not displayed.       Thyroid Studies     Recent Labs   Lab Test 10/16/18  0902 07/30/18  0855 07/13/17  0843 05/10/17  0929 07/18/16  1430  09/23/14  0810   TSH 18.39* 8.64* 3.66 4.74* 2.43   < > 2.87   T4 0.85 1.02 1.59* 1.48* 1.38   < > 1.29  9.1   T3  --  44*  --   --   --   --  65    < > = values in this  interval not displayed.       Urine Studies     Recent Labs   Lab Test 10/16/18  0925 07/30/18  0852 05/02/18  1008 08/31/17  2100 08/22/17  0913 07/18/16  1503   URINEPH 6.0 6.0 6.0 8.0* 7.0 5.5   NITRITE Negative Negative Neg Negative Negative Negative   LEUKEST Small* Moderate* Trace Small* Trace* Large*   WBCU 0 - 5 5-10*  --  2 2-5* 10-25*       Medication levels    Recent Labs   Lab Test 10/16/18  0902  07/30/18  0858  10/23/17  1025  09/15/13  0435  12/21/12  1344   VANCOMYCIN  --   --   --   --   --   --  26.3  --   --    VCON  --   --  1.3  --   --   --   --   --   --    CYCLSP  --   --   --   --  Canceled, Test credited   < >  --   --   --    RAPAMY 4.2*   < >  --    < >  --    < >  --    < >  --    MPACID  --   --   --   --   --   --   --   --  5.42*   MPAG  --   --   --   --   --   --   --   --  83.5    < > = values in this interval not displayed.       Microbiology:    [facility]:        Last Culture results with specimen source  Culture Micro   Date Value Ref Range Status   09/27/2018 Moderate growth  Staphylococcus aureus   (A)  Final   07/30/2018   Final    50,000 to 100,000 colonies/mL  mixed urogenital louann  Susceptibility testing not routinely done     07/18/2016 (A)  Final    50,000 to 100,000 colonies/mL Klebsiella pneumoniae   05/21/2016 No growth  Final   05/21/2016 No growth  Final   05/16/2016 Canceled, Test credited Duplicate request  Final   05/16/2016 Canceled, Test credited Duplicate request  Final   05/16/2016 No growth  Final   05/16/2016 No growth  Final   05/13/2016 No growth after 29 days  Final   05/13/2016   Final    Canceled, Test credited Quantity not sufficient Notification of test cancellation   was given to MATTHEW FROM [facility], HE WILL REORDER AND RECOLLECT     05/13/2016 No growth  Final   05/13/2016 No growth  Final   05/13/2016   Final    10,000 to 50,000 colonies/mL mixed urogenital louann  Susceptibility testing not routinely done     05/13/2016 No growth  Final    09/25/2014 (A)  Final    Normal louann  Moderate growth Haemophilus influenzae Beta lactamase negative     09/03/2014 (A)  Final    Normal louann  Heavy growth Haemophilus influenzae Beta lactamase negative  beta lactamase negative isolates are susceptible to ampicillin,   amoxacillin/clavulanic, levofloxacin and ceftriaxone     08/19/2014 No growth  Final   08/19/2014 No growth  Final    Specimen Description   Date Value Ref Range Status   09/27/2018 Right Hand Wound  Final   07/30/2018 Midstream Urine  Final   07/18/2016 Midstream Urine  Final   05/21/2016 Blood Right Arm  Final   05/21/2016 Blood Left Arm  Final   05/16/2016 Blood  Final   05/16/2016 Blood  Final   05/16/2016 Blood Left Arm  Final   05/16/2016 Blood Right Arm  Final   05/13/2016 Blood Unspecified Site  Final   05/13/2016 Blood Unspecified Site  Final   05/13/2016 Blood Right Arm  Final   05/13/2016 Blood Right Arm  Final   05/13/2016 Midstream Urine  Final   05/13/2016 Nasal  Final   05/13/2016 Blood Venipuncture Collection VPT  Final   09/25/2014 Sputum  Final   09/25/2014 Sputum  Final   09/03/2014 Sputum  Final   09/03/2014 Sputum  Final        Last check of C difficile  C Diff Toxin B PCR   Date Value Ref Range Status   05/17/2012   Final    Negative: Clostridium difficile target DNA sequences NOT detected, presumed   negative for Clostridium difficile toxin B or the number of bacteria present   may be below the limit of detection for the test.   FDA approved assay performed using [employer] GeneXpert real-time PCR.   A negative result does not exclude actual disease due to Clostridium difficile   and may be due to improper collection, handling and storage of the specimen or   the number of organisms in the specimen is below the detection limit of the   assay.       Virology:  CMV Quantitative   Date Value Ref Range Status   08/19/2014 <100 <100 Copies/mL Final   08/15/2013 <100 <100 Copies/mL Final   11/11/2008 <100 <100 Copies/mL Final      Comment:     No CMV DNA detected.   08/21/2007 <100 <100 Copies/mL Final     Comment:     No CMV DNA detected.   01/10/2007 <100 <100 Copies/mL Final     Comment:     No CMV DNA detected.       EBV DNA Copies/mL   Date Value Ref Range Status   07/30/2018 2,166 (A) EBVNEG^EBV DNA Not Detected [Copies]/mL Final   08/22/2017 EBV DNA Not Detected EBVNEG^EBV DNA Not Detected [Copies]/mL Final   04/11/2017 (A) EBVNEG [Copies]/mL Final    <500  EBV DNA Detected below the reportable range of 500 Copies/mL     12/09/2016 1,219 (A) EBVNEG [Copies]/mL Final   08/08/2016 29,569 (A) EBVNEG [Copies]/mL Final   07/26/2016 11,522 (A) EBVNEG [Copies]/mL Final   05/24/2016 24,676 (A) EBVNEG [Copies]/mL Final   05/13/2016 19,224 (A) EBVNEG [Copies]/mL Final   11/20/2015 25,676 (A) EBVNEG [Copies]/mL Final   09/14/2015 48,332 (A) EBVNEG [Copies]/mL Final   07/20/2015 48,923 (A) EBVNEG [Copies]/mL Final   09/15/2013 <1000 <1000 Copies/mL Final   08/15/2013 <1000 <1000 Copies/mL Final   11/12/2008 <1000 <1000 Copies/mL Final       BK viral loads   Recent Labs   Lab Test 07/28/11  1150   BKSPEC Urine   BKRES <1000       Imaging   CT CHEST W/O CONTRAST 6/27/2018 3:15 PM  Comparison: CT chest 3/27/2018, 7/31/2017, 4/24/2017; PET CT 8/8/2017   Findings: Chest: Heart size within normal limits. No pericardial effusion. The  thoracic aorta and pulmonary artery are normal caliber. No enlarged  thoracic lymph nodes by CT short axis size criteria.  Central airways are patent. No pleural effusion or pneumothorax.  Innumerable calcified granulomas, mostly clustered in the right lung  base, nicely change from the prior CT. There is a cavitary 12 mm  nodule in the lingula, which previously measured 1.2 cm on 3/27/2018  (however this measured 8 mm on 4/24/2017). 4 mm pulmonary nodule in  the left lower lobe (series 6 image 155) which is unchanged from 4/24/2017.  Upper abdomen: Limited evaluation of the upper abdomen. Unchanged  pneumobilia.  Postsurgical changes of liver transplant. Atrophic  kidneys. Adrenal glands are normal.        Impression:   1. Unchanged 12 mm cavitary nodule in the lingula since 3/27/2018.  However, this nodule is increased in size since 4/24/2017 when it  measured 8 mm and was FDG avid on the PET/CT 8/8/2017. Recommend  continued close follow-up (3-6 months) and/or soft tissue biopsy.  2. Extensive granulomatous disease mostly clustered in the right lower lobe.     Again, thank you for allowing me to participate in the care of your patient.      Sincerely,    Crystal Montero MD

## 2019-05-15 NOTE — NURSING NOTE
"Chief Complaint   Patient presents with     RECHECK     Chronic otitis extema of left ear, EBV       Vital signs:  Temp: 98.1  F (36.7  C) Temp src: Oral BP: 145/73 Pulse: 64   Resp: 18 SpO2: 98 %          Estimated body mass index is 31.96 kg/m  as calculated from the following:    Height as of 10/25/18: 1.6 m (5' 3\").    Weight as of 5/14/19: 81.8 kg (180 lb 6.4 oz).          Dora Gonzales [facility]  5/15/2019 11:27 AM      "

## 2019-05-15 NOTE — PROGRESS NOTES
[facility]  Transplant Infectious Disease Clinic Note     Patient:  Luz Thompson, Date of birth 1950, Medical record number [id number]  Date of Visit:  05/15/2019         Assessment and Recommendations:   Recommendations:  - Continue voriconazole through result reporting of 5/21/2019 CT scan. Prior auth for vori refills will be completed as needed.   - Follow-up on 5/21/2019 Chest CT. If stable, will consider changing vori to posa (with a prior auth), so that there is no interruption in pill supplies.   - Nephrology referral.   - Liver Transplant labs next week to include inflammatory markers, liver testing and voriconazole level. Orders placed.   - Return to clinic upon [place], ~ 8 months from now.    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 She gets recurrent gram negative sepsis. She has known sigmoid diverticulosis. She gets episodes of defecation syncope.   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 Followup CT imaging 6/27/2018 shows no worsening of the nodularities over a 1-yr interval; we reviewed this scan together in clinic.  She continues on vori for cocci, and she's had some dry skin and areas of frequent picking of skin since she started vori. Continue voriconazole. Prior auth for vori refills will be completed as needed. Transplant labs next week to include inflammatory markers, liver testing and voriconazole level. Follow-up Chest CT scheduled for 5/21/2019. My concern with long term vori is her sun exposure in [place], as well as the elevated skin cancer risk in transplant populations that take vori over a long period of time. If 5/21/2019 CT scan stable, will consider changing vori to posa (with a prior auth), so that there is no interruption in pill  "supplies.   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 She had some side effects in the days following the dose of Rituxan, but otherwise would be able to tolerate it again in the future. Will check EBV level with next labs.   - Left otitis. On ciprodex drops prn. Followed in ENT clinic due to perforation.   - Self-triggered use of prn antibiotics due to recurrent episodes of sepsis. She gets a lot of itching with vantin, and quinolones and macrolides don't seem to work that well for her, so her current prn med is bactrim. If the bactrim does not do the job in controlling fever, because she has allergies to penicillins and quinolones (cipro and levaquin), she knows to be seen somewhere where she could be evaluated for a once daily infusion of ertapenem (also called Invanz).   - Hx of E coli pyelonephritis in 3/2019, hospitalized twice in [place].   - Hx of bacterial superinfection of chronic venous stasis changes on the legs, 7/27/2016.  - Hx of recurrent E coli sepsis 8/13/2013, 6/10/2015.   - Hx of Klebsiella UTI, 7/18/16. She completed a 14-day course of macrobid.  - Hx of Haemophilus influenzae pneumonia in 9/2014.  - Hx of angular chelitis, hx of thrush extending from under her upper denture plate, and onychomycosis.   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   - QTc interval 5/18/2018 457 msec.   - PCP prophylaxis: Not needed, as most recent CD4 count was > 200.   - Serostatus: CMV seronegative, EBV seropositive on 8/15/13.  - Immunization status: Completed PCV13 and PPSV23 x2 with last dose in 5/2016.  - Gamma globulin status: Endogenously replete.  - Isolation status: Good hand hygience. When she is an inpatient, she needs to be in contact isolation for known VRE carriage.    Crystal Montero MD. Pager [phone]         History of Infectious Disease Illness:   Since Virginia was last seen in ID clinic 7/25/2018, she is taking vori for cocci. She thinks she has no side effects from vori. She is living in a "park " "model\" in [place]. She had a bad E coli pyelonephritis in 3/2019, hospitalized twice in [place], although the second admission was more for weakness and syncope than for infection. Records reviewed together with Virginia, using [facility] Everywhere. No labs since the hospital time in 3/2019, planning to get labs next week when she sees her transplant hepatologist Dr. Ramirez. Planning to switch to [facility] for future rx issues. Her endocrinologist thought she should have a nephrologist, since she has diabetes and Cr elevated, but she has not starting looking for a nephrologist. She is  from her  of 39 years. She spends the winter in [place], different places here in [facility] in the summer, currently in [place].     Transplants:  5/22/2002 (Liver), Postoperative day:  6202         Review of Systems:   CONSTITUTIONAL:  No fever, chills, night sweats.  EYES: negative for icterus. Vision good with treatment of cataract and glaucoma issues.   ENT:  She wears hearing aids. She takes her dentures out at night. No sore throat.   RESPIRATORY:  No cough, no sputum, but sometimes she has dyspnea.   CARDIOVASCULAR:  negative for chest pain, no heart palpitations  GASTROINTESTINAL:  negative for nausea, vomiting, diarrhea, constipation.  GENITOURINARY:  No dysuria or hematuria  HEME:  Normal amount of easy bruising, + easy bleeding.   INTEGUMENT:  She has her normal amount of itching from dry skin. No new rash.   NEURO:  No headache. No tremor.     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      Diverticulosis of " sigmoid colon 12/21/2013     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     Received Rituxan during Summer of 2016     H/O esophageal varices      Hearing loss      Heart murmur 4/4/2012     History of DVT (deep vein thrombosis)      History of [place] fever      History of thyroid cancer 9/25/2012     Hyperlipidemia 4/10/2012    Says that she does not have it anymore, not on meds     Hyperlipidemia LDL goal <70      Hypertension goal BP (blood pressure) < 140/80 11/06/2013     Hypertriglyceridemia      Liver replaced by transplant (H) 10/17/2011    Dr. 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       Past Surgical History:   Procedure Laterality Date     APPENDECTOMY  1961     BIOPSY       CATARACT IOL, RT/LT      RE12/19/2013, LE12/10/2013 - Toric lenses     CHOLECYSTECTOMY  1991     COLONOSCOPY  3/10/2014    Procedure: COLONOSCOPY;;  Surgeon: Gentry Ramirez MD;  Location:  GI     ear drum repair       ENDOBRONCHIAL ULTRASOUND FLEXIBLE N/A 9/29/2017    Procedure: ENDOBRONCHIAL ULTRASOUND FLEXIBLE;  Flexible Bronchoscopy, Endobronchial Ultrasound, Transbronchial Needle Aspiration ;  Surgeon: Eden Clinton MD;  Location:  OR     ENDOSCOPIC RETROGRADE CHOLANGIOPANCREATOGRAM  9/19/2013    Procedure: ENDOSCOPIC RETROGRADE CHOLANGIOPANCREATOGRAM;  Endoscopic Retrograde Cholangiopancreatogram with single balloon enteroscopy, ballon sweep of bile duct;  Surgeon: Brett Membreno MD;  Location:  OR      KNEE SCOPE,MED/LAT MENISECTOMY  8/10/12    Right, partial medial menisectomy only  "    KNEE SURGERY  1966    R knee     PICC INSERTION  2013    4fr SL PASV PICC, 40cm (1cm external) in the R basilic vein w/ tip in the low SVC     PICC INSERTION  2014    5 fr DL BioFlo Navilyst PICC, 46 cm (3 cm external) in the L basilic vein w/ tip in the SVC RA junction.     THYROIDECTOMY  3/2010     TRANSPLANT LIVER RECIPIENT LIVING UNRELATED         Family History   Problem Relation Age of Onset     Hypertension Mother      Endometrial Cancer Mother      Hyperlipidemia Mother      Prostate Cancer Father      Macular Degeneration Father      Cancer - colorectal Maternal Grandmother         in her 80's, has surgery and removal of part of kidney,  at age 98     Heart Disease Maternal Grandfather          at 98     Glaucoma Maternal Grandfather      Cerebrovascular Disease Paternal Grandmother         in her 80's     Hypertension Paternal Grandmother      Heart Disease Paternal Grandfather         MI     Alzheimer Disease Paternal Grandfather      Allergies Son      Neurologic Disorder Daughter         Migraines     Breast Cancer Other      Anesthesia Reaction No family hx of      Crohn's Disease No family hx of      Ulcerative Colitis No family hx of        Social History     Social History Narrative    She is retired. She lives in [place] [facility] [place] the course of the [place]. [place] h[place], [place], corn and soybean crops.     Social History     Tobacco Use     Smoking status: Former Smoker     Packs/day: 1.00     Years: 18.00     Pack years: 18.00     Types: Cigarettes     Last attempt to quit: 1985     Years since quittin.1     Smokeless tobacco: Never Used   Substance Use Topics     Alcohol use: Yes     Alcohol/week: 0.0 oz     Comment: rare - \"I toast at weddings\"     Drug use: No       Immunization History   Administered Date(s) Administered     T0t5-12 Novel Flu P-free 11/10/2009     Influenza (High Dose) 3 " valent vaccine 09/25/2015, 10/30/2016, 10/23/2017     Influenza (IIV3) PF 11/10/2004, 09/29/2007, 10/01/2010, 09/27/2012, 10/29/2012, 09/30/2013, 09/01/2016     Influenza Quad, Recombinant, p-free (RIV4) 10/20/2018     Pneumo Conj 13-V (2010&after) 02/26/2014     Pneumococcal 23 valent 12/01/2007, 05/25/2016     TD (ADULT, 7+) 01/01/2007     TDAP Vaccine (Adacel) 09/17/2007, 02/26/2014       Patient Active Problem List   Diagnosis     Bladder infection, chronic     Osteoarthritis of right knee     HDL deficiency     Advanced directives, counseling/discussion     Vitamin D deficiency     S/P tympanoplasty     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     Prophylactic antibiotic     High risk medication use     Statin intolerance     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     SNHL (sensorineural hearing loss)     Abnormal liver function tests     Liver replaced by transplant (H)     Type 2 diabetes, HbA1c goal < 7% (H)     Hyperlipidemia LDL goal <70     Hypertension goal BP (blood pressure) < 140/80     Postoperative hypothyroidism     Type 2 diabetes mellitus with stage 3 chronic kidney disease, without long-term current use of insulin (H)     Age-related osteoporosis without current pathological fracture     Perforation of tympanic membrane, left     Other infective chronic otitis externa of left ear     CKD (chronic kidney disease) stage 3, GFR 30-59 ml/min (H)       Outpatient Medications Marked as Taking for the 5/15/19 encounter (Office Visit) with Crystal Montero MD   Medication Sig     acetaminophen 500 MG CAPS Take 1,000 mg by mouth nightly as needed Take 500-1,000 mg by mouth every 6 hours if needed.      alirocumab (PRALUENT) 150 MG/ML injectable pen Inject 1 mL (150 mg) Subcutaneous every 14 days     blood glucose monitoring (NO BRAND SPECIFIED) meter device kit Use to test blood sugar 2times daily or as directed.     blood glucose monitoring (NO BRAND SPECIFIED) test strip Use to test blood sugar 2 times daily, before breakfast and  before bedtime     calcitRIOL (ROCALTROL) 0.5 MCG capsule Take 1 capsule (0.5 mcg) by mouth daily     ciprofloxacin-dexamethasone (CIPRODEX) otic suspension Place 4 drops Into the left ear three times a week     ELIQUIS 2.5 MG tablet Take 1 tablet (2.5 mg) by mouth 2 times daily     estradiol (ESTRACE VAGINAL) 0.1 MG/GM cream Place 2 g vaginally twice a week     ezetimibe (ZETIA) 10 MG tablet TAKE 1 TABLET BY MOUTH EVERY DAY OR AS DIRECTED BY DR WOLF     ferrous gluconate (FERGON) 324 (38 Fe) MG tablet Take 1 tablet (324 mg) by mouth 3 times daily (with meals)     folic acid (FOLVITE) 1 MG tablet Take 1 tablet (1 mg) by mouth daily     furosemide (LASIX) 20 MG tablet TAKE 1/2 TABLET BY MOUTH EVERY DAY     hydrALAZINE (APRESOLINE) 25 MG tablet Take 0.5 tablets (12.5 mg) by mouth 3 times daily as needed , if systolic blood pressure is more than 140 mmHg.     Hypromellose (ARTIFICIAL TEARS OP) Apply 1 drop to eye as needed     ketoconazole (NIZORAL) 2 % cream Apply topically 2 times daily To angles of mouth     levothyroxine (SYNTHROID/LEVOTHROID) 150 MCG tablet TAKE 1.5 TABLETS BY MOUTH ON MONDAYS. AND 1 TABLET DAILY, THE OTHER DAYS OF THE WEEK.     meclizine (ANTIVERT) 25 MG tablet Take 1 tablet (25 mg) by mouth as needed     metoprolol succinate (TOPROL-XL) 50 MG 24 hr tablet Take 1 tablet (50 mg) by mouth daily     metroNIDAZOLE (METROCREAM) 0.75 % cream Apply topically 2 times daily     Multiple Vitamins-Minerals (PRESERVISION AREDS 2) CAPS Take 1 capsule by mouth 2 times daily     omega-3 acid ethyl esters (LOVAZA) 1 g capsule Take 1 capsule (1 g) by mouth 2 times daily     predniSONE (DELTASONE) 5 MG tablet Take 1 tablet (5 mg) by mouth daily     RAPAMUNE (BRAND) 1 MG PO TABLET Take 1 tablet (1 mg) by mouth every other day     sertraline (ZOLOFT) 100 MG tablet Take 1 tab (100mg) PO daily (Patient taking differently: Take 50 mg by mouth daily )     SUMAtriptan (IMITREX) 50 MG tablet Take 1 tablet (50 mg) by  mouth at onset of headache for migraine repeat after 2 hours if needed.     triamcinolone (KENALOG) 0.1 % cream Apply topically 2 times daily as needed for irritation       Allergies   Allergen Reactions     Fluconazole Hives and Itching     Ciprofloxacin Anxiety and Other (See Comments)     Irregular heart beat     Azithromycin Itching     Benadryl [Diphenhydramine Hcl]      Insomnia      Cellcept Diarrhea     Ciprofloxacin Other (See Comments)     Insomnia, mood lability     Codeine      Psych disturbance     Codeine      Diphenhydramine Other (See Comments)     Doxycycline      Lansoprazole Diarrhea     Levaquin [Levofloxacin] Other (See Comments)     Headache, hyperactivity     Lisinopril Cough     Methotrexate      Sores     Methotrexate      Morphine Sulfate Itching     Mycophenolate Diarrhea     No Clinical Screening - See Comments      Simvastatin Muscle Pain (Myalgia)     severe     Cephalexin Rash     Fever and skin burning     Penicillin G Itching and Rash     Tolectin [Nsaids] Rash     Tramadol Rash            Physical Exam:   Vitals were reviewed.  All vitals stable.  LMP 06/01/1988 (Approximate)    Wt Readings from Last 4 Encounters:   10/25/18 81.6 kg (179 lb 12.8 oz)   10/08/18 79.4 kg (175 lb)   09/21/18 78.7 kg (173 lb 6.4 oz)   09/12/18 78.6 kg (173 lb 5 oz)       Physical Examination:  GENERAL:  well-developed, well-nourished woman, in no acute distress.  HEENT:  Head is normocephalic, atraumatic   EYES:  Eyes have anicteric sclerae.   ENT: Hearing aids in place today. Dentures in place, no thrush.   NECK:  Supple.   LUNGS: Clear to auscultation bilateral.   CARDIOVASCULAR: Regular rate and rhythm with 2/6 systolic murmur heard best at RUSB but no gallops or rubs.   ABDOMEN: Normal bowel sounds, not tender. Subcostal surgical scar not otherwise examined.  SKIN:  No acute rash. She is wearing black compression stockings. Onychomycosis not examined today.   NEUROLOGIC:  Grossly nonfocal. Active x4  extremities         Laboratory Data:     Absolute CD4   Date Value Ref Range Status   08/19/2014 415 mm3 Final   09/16/2013 1,266 mm3 Final   09/15/2013 DUPLICATE,TESTING DONE ON SPECIMEN FROM 9.16.13 mm3 Final   11/13/2008 1332 mm3 Final       Inflammatory Markers    Recent Labs   Lab Test 07/30/18  0855 09/03/17  0912 09/02/17  0648 09/01/17  0832 05/14/16  0659 05/13/16  0940 09/23/14  0810 08/19/14  1530  06/30/14  0825 05/15/14  1112 05/08/14  0806 03/04/14  1315   SED 73*  --   --   --   --  67* 79* 76*  --  51* 58* 51* 55*   CRP 5.2 64.0* 71.0* 51.0* 21.0* 19.0* 6.1 29.8*   < > 12.5* 8.0 <5.0 5.5    < > = values in this interval not displayed.       Immune Globulin Studies    Recent Labs   Lab Test 08/19/14  1530 05/21/12  0754   IGG 1,220 1070   IGM  --  97   IGE 46  --    IGA  --  85   IGG1 684  --    IGG2 441  --    IGG3 70  --    IGG4 19  --        Metabolic Studies       Recent Labs   Lab Test 12/28/18 10/16/18  0905 10/16/18  0902 07/30/18  0913 05/19/18  0629 05/18/18  0731  05/14/18  1019 04/17/18  0942  09/18/17  0921  05/10/17  0929  05/24/16  0846  05/13/16  0940  07/23/14  1119     --  138 140 136 137  --  133 138   < > 137   < > 138   < > 135   < > 132*   < > 137   POTASSIUM 3.9  --  3.8 3.3* 4.1 3.6  --  3.3* 3.9   < > 5.0   < > 4.2   < > 3.6   < > 3.6   < > 3.8   CHLORIDE 99  --  102 102 103 100  --  100 102   < > 99   < > 102   < > 99   < > 96   < > 98   CO2 28  --  27 27 27 27  --  27 28   < > 32   < > 26   < > 27   < > 26   < > 27   ANIONGAP 12  --  9 11 6 10  --  6 8   < > 6   < > 10   < > 9   < > 10   < > 12   BUN 16  13.6  --  26 26 20 20  --  23 36*   < > 36*   < > 36*   < > 28   < > 29   < > 35*   CR 1.18  --  1.19* 1.11* 1.29* 1.22*  --  1.40* 1.74*   < > 1.26*   < > 1.53*   < > 1.77*   < > 1.64*   < > 1.70*   GFRESTIMATED 47*  --  45* 49* 41* 44*  --  37* 29*   < > 42*   < > 34*   < > 29*   < > 31*   < > 30*   *  --  102* 79 103* 119*  --  124* 125*   < > 108*   < > 104*    < > 94   < > 98   < > 109*   A1C  --  6.4*  --   --   --  6.8*   < >  --   --   --   --   --   --   --   --   --   --   --   --    KEILY 8.9  --  8.9 8.7 8.5 8.6  --  9.0 9.1   < > 9.6   < > 9.7   < > 8.8   < > 9.1   < > 9.1   PHOS  --   --   --   --   --   --   --   --   --   --   --   --  3.1  --  3.2  --   --    < >  --    MAG  --   --   --   --   --   --   --   --  2.8*  --  2.2   < > 2.8*   < > 2.3  --  2.2   < >  --    LACT  --   --   --   --   --   --   --   --   --   --   --   --   --   --   --   --  0.8  --  1.7    < > = values in this interval not displayed.       Hepatic Studies    Recent Labs   Lab Test 12/28/18 10/16/18  0902 07/30/18  0913 05/14/18  1019 04/17/18  0942 10/23/17  1024   BILITOTAL 0.2 0.2 0.2 0.2 0.3 0.3   ALKPHOS 227* 238* 276* 240* 211* 222*   ALBUMIN 3.8 3.0* 2.8* 3.1* 3.3* 3.2*   AST 20 19 25 23 26 23   ALT 19 23 37 43 42 47       Lipid testing    Recent Labs   Lab Test 12/28/18 10/16/18  0902 05/18/18  0731  09/18/15  0954   CHOL 225* 300* 265*   < > 181   HDL 47 52 49*   < > 63    194* Cannot estimate LDL when triglyceride exceeds 400 mg/dL  150*   < > 84   TRIG 339* 269* 557*   < > 172*   CHOLHDLRATIO 4.8*  --   --   --  2.9    < > = values in this interval not displayed.       Gout Labs      Recent Labs   Lab Test 12/31/13  1443 07/30/13  1441   URIC 6.7 6.7       Hematology Studies      Recent Labs   Lab Test 10/16/18  0902 07/30/18  0913 05/19/18  0629 05/18/18  0731 05/14/18  1019 04/17/18  0942  09/02/17  0648 09/01/17  0832 08/31/17  1306  05/16/16  0827  05/14/16  0659   WBC 7.2 7.4 6.1 8.0 8.6 8.3   < > 10.2 9.4 10.6   < > 5.7   < > 4.7   ANEU  --   --   --  5.0  --   --   --  7.4 6.5 8.6*  --  2.5  --  2.1   ALYM  --   --   --  1.9  --   --   --  1.7 1.7 1.3  --  2.4  --  1.6   KATHY  --   --   --  1.0  --   --   --  0.9 1.0 0.7  --  0.6  --  0.9   AEOS  --   --   --  0.1  --   --   --  0.2 0.1 0.0  --  0.1  --  0.1   HGB 10.3* 10.7* 10.3* 11.6* 11.5* 11.9   < >  10.5* 11.1* 12.4   < > 11.1*   < > 10.0*   HCT 33.1* 33.8* 33.0* 36.0 36.1 37.7   < > 34.0* 35.0 38.5   < > 35.4   < > 31.8*    327 302 324 326 348   < > 259 269 290   < > 256   < > 230    < > = values in this interval not displayed.       Iron Testing    Recent Labs   Lab Test 10/16/18  0902 07/30/18  0855  07/11/13  0814  04/18/13  0809  12/22/12  1346  12/20/12 2005 12/07/12  1345   IRON  --   --  39  --  58  --  63   < >  --   --   --  48   FEB  --   --  307  --  285  --  315   < >  --   --   --  156*   IRONSAT  --   --  13*  --  20  --  20   < >  --   --   --  30   LARUA  --   --  18   < > 195   < > 232   < >  --   --   --  317*   MCV 88   < >  --    < > 88   < > 92   < > 100   < > 103*  --    B12  --   --   --   --   --   --   --   --   --   --   --  281   [id number]  --   --   --   --   --   --   --   --   --   --   --  272*   HAPT  --   --   --   --   --   --   --   --   --   --  137  --    RETP  --   --   --   --   --   --   --   --  2.7*  --   --   --    RETICABSCT  --   --   --   --   --   --   --   --  71.0  --   --   --     < > = values in this interval not displayed.       Clotting Studies    Recent Labs   Lab Test 05/18/18  0731 05/16/16  0827 05/13/16  0940 11/06/13  1246  06/19/11  1815   INR 1.06 1.02 1.11 0.96   < > 1.08   PTT 33  --  33  --   --  28    < > = values in this interval not displayed.       Thyroid Studies     Recent Labs   Lab Test 10/16/18  0902 07/30/18  0855 07/13/17  0843 05/10/17  0929 07/18/16  1430  09/23/14  0810   TSH 18.39* 8.64* 3.66 4.74* 2.43   < > 2.87   T4 0.85 1.02 1.59* 1.48* 1.38   < > 1.29  9.1   T3  --  44*  --   --   --   --  65    < > = values in this interval not displayed.       Urine Studies     Recent Labs   Lab Test 10/16/18  0925 07/30/18  0852 05/02/18  1008 08/31/17  2100 08/22/17  0913 07/18/16  1503   URINEPH 6.0 6.0 6.0 8.0* 7.0 5.5   NITRITE Negative Negative Neg Negative Negative Negative   LEUKEST Small* Moderate* Trace Small* Trace* Large*    WBCU 0 - 5 5-10*  --  2 2-5* 10-25*       Medication levels    Recent Labs   Lab Test 10/16/18  0902  07/30/18  0858  10/23/17  1025  09/15/13  0435  12/21/12  1344   VANCOMYCIN  --   --   --   --   --   --  26.3  --   --    VCON  --   --  1.3  --   --   --   --   --   --    CYCLSP  --   --   --   --  Canceled, Test credited   < >  --   --   --    RAPAMY 4.2*   < >  --    < >  --    < >  --    < >  --    MPACID  --   --   --   --   --   --   --   --  5.42*   MPAG  --   --   --   --   --   --   --   --  83.5    < > = values in this interval not displayed.       Microbiology:    [facility]:        Last Culture results with specimen source  Culture Micro   Date Value Ref Range Status   09/27/2018 Moderate growth  Staphylococcus aureus   (A)  Final   07/30/2018   Final    50,000 to 100,000 colonies/mL  mixed urogenital louann  Susceptibility testing not routinely done     07/18/2016 (A)  Final    50,000 to 100,000 colonies/mL Klebsiella pneumoniae   05/21/2016 No growth  Final   05/21/2016 No growth  Final   05/16/2016 Canceled, Test credited Duplicate request  Final   05/16/2016 Canceled, Test credited Duplicate request  Final   05/16/2016 No growth  Final   05/16/2016 No growth  Final   05/13/2016 No growth after 29 days  Final   05/13/2016   Final    Canceled, Test credited Quantity not sufficient Notification of test cancellation   was given to MATTHEW FROM [facility], HE WILL REORDER AND RECOLLECT     05/13/2016 No growth  Final   05/13/2016 No growth  Final   05/13/2016   Final    10,000 to 50,000 colonies/mL mixed urogenital louann  Susceptibility testing not routinely done     05/13/2016 No growth  Final   09/25/2014 (A)  Final    Normal louann  Moderate growth Haemophilus influenzae Beta lactamase negative     09/03/2014 (A)  Final    Normal louann  Heavy growth Haemophilus influenzae Beta lactamase negative  beta lactamase negative isolates are susceptible to ampicillin,   amoxacillin/clavulanic,  levofloxacin and ceftriaxone     08/19/2014 No growth  Final   08/19/2014 No growth  Final    Specimen Description   Date Value Ref Range Status   09/27/2018 Right Hand Wound  Final   07/30/2018 Midstream Urine  Final   07/18/2016 Midstream Urine  Final   05/21/2016 Blood Right Arm  Final   05/21/2016 Blood Left Arm  Final   05/16/2016 Blood  Final   05/16/2016 Blood  Final   05/16/2016 Blood Left Arm  Final   05/16/2016 Blood Right Arm  Final   05/13/2016 Blood Unspecified Site  Final   05/13/2016 Blood Unspecified Site  Final   05/13/2016 Blood Right Arm  Final   05/13/2016 Blood Right Arm  Final   05/13/2016 Midstream Urine  Final   05/13/2016 Nasal  Final   05/13/2016 Blood Venipuncture Collection VPT  Final   09/25/2014 Sputum  Final   09/25/2014 Sputum  Final   09/03/2014 Sputum  Final   09/03/2014 Sputum  Final        Last check of C difficile  C Diff Toxin B PCR   Date Value Ref Range Status   05/17/2012   Final    Negative: Clostridium difficile target DNA sequences NOT detected, presumed   negative for Clostridium difficile toxin B or the number of bacteria present   may be below the limit of detection for the test.   FDA approved assay performed using [employer] GeneXpert real-time PCR.   A negative result does not exclude actual disease due to Clostridium difficile   and may be due to improper collection, handling and storage of the specimen or   the number of organisms in the specimen is below the detection limit of the   assay.       Virology:  CMV Quantitative   Date Value Ref Range Status   08/19/2014 <100 <100 Copies/mL Final   08/15/2013 <100 <100 Copies/mL Final   11/11/2008 <100 <100 Copies/mL Final     Comment:     No CMV DNA detected.   08/21/2007 <100 <100 Copies/mL Final     Comment:     No CMV DNA detected.   01/10/2007 <100 <100 Copies/mL Final     Comment:     No CMV DNA detected.       EBV DNA Copies/mL   Date Value Ref Range Status   07/30/2018 2,166 (A) EBVNEG^EBV DNA Not Detected  [Copies]/mL Final   08/22/2017 EBV DNA Not Detected EBVNEG^EBV DNA Not Detected [Copies]/mL Final   04/11/2017 (A) EBVNEG [Copies]/mL Final    <500  EBV DNA Detected below the reportable range of 500 Copies/mL     12/09/2016 1,219 (A) EBVNEG [Copies]/mL Final   08/08/2016 29,569 (A) EBVNEG [Copies]/mL Final   07/26/2016 11,522 (A) EBVNEG [Copies]/mL Final   05/24/2016 24,676 (A) EBVNEG [Copies]/mL Final   05/13/2016 19,224 (A) EBVNEG [Copies]/mL Final   11/20/2015 25,676 (A) EBVNEG [Copies]/mL Final   09/14/2015 48,332 (A) EBVNEG [Copies]/mL Final   07/20/2015 48,923 (A) EBVNEG [Copies]/mL Final   09/15/2013 <1000 <1000 Copies/mL Final   08/15/2013 <1000 <1000 Copies/mL Final   11/12/2008 <1000 <1000 Copies/mL Final       BK viral loads   Recent Labs   Lab Test 07/28/11  1150   BKSPEC Urine   BKRES <1000       Imaging   CT CHEST W/O CONTRAST 6/27/2018 3:15 PM  Comparison: CT chest 3/27/2018, 7/31/2017, 4/24/2017; PET CT 8/8/2017   Findings: Chest: Heart size within normal limits. No pericardial effusion. The  thoracic aorta and pulmonary artery are normal caliber. No enlarged  thoracic lymph nodes by CT short axis size criteria.  Central airways are patent. No pleural effusion or pneumothorax.  Innumerable calcified granulomas, mostly clustered in the right lung  base, nicely change from the prior CT. There is a cavitary 12 mm  nodule in the lingula, which previously measured 1.2 cm on 3/27/2018  (however this measured 8 mm on 4/24/2017). 4 mm pulmonary nodule in  the left lower lobe (series 6 image 155) which is unchanged from 4/24/2017.  Upper abdomen: Limited evaluation of the upper abdomen. Unchanged  pneumobilia. Postsurgical changes of liver transplant. Atrophic  kidneys. Adrenal glands are normal.        Impression:   1. Unchanged 12 mm cavitary nodule in the lingula since 3/27/2018.  However, this nodule is increased in size since 4/24/2017 when it  measured 8 mm and was FDG avid on the PET/CT 8/8/2017.  Recommend  continued close follow-up (3-6 months) and/or soft tissue biopsy.  2. Extensive granulomatous disease mostly clustered in the right lower lobe.

## 2019-05-16 ENCOUNTER — OFFICE VISIT (OUTPATIENT)
Dept: OTOLARYNGOLOGY | Facility: CLINIC | Age: 69
End: 2019-05-16
Payer: MEDICARE

## 2019-05-16 VITALS
BODY MASS INDEX: 31.53 KG/M2 | WEIGHT: 178 LBS | HEART RATE: 68 BPM | DIASTOLIC BLOOD PRESSURE: 71 MMHG | SYSTOLIC BLOOD PRESSURE: 124 MMHG | TEMPERATURE: 98.3 F

## 2019-05-16 DIAGNOSIS — H72.92 TYMPANIC MEMBRANE PERFORATION, LEFT: ICD-10-CM

## 2019-05-16 DIAGNOSIS — H90.3 SENSORINEURAL HEARING LOSS (SNHL) OF BOTH EARS: Primary | ICD-10-CM

## 2019-05-16 DIAGNOSIS — Z98.890 STATUS POST TYMPANOPLASTY: ICD-10-CM

## 2019-05-16 PROCEDURE — 99214 OFFICE O/P EST MOD 30 MIN: CPT | Performed by: OTOLARYNGOLOGY

## 2019-05-16 ASSESSMENT — PAIN SCALES - GENERAL: PAINLEVEL: NO PAIN (0)

## 2019-05-16 NOTE — PROGRESS NOTES
History of Present Illness - Luz Thompson is a 69 year old female here in close follow up from 10/8/2019, to check on the resolution of a LEFT otitis externa and otitis media, with a perforation.    To review, she has had LEFT ear surgery with a tympanoplasty in 1996.  Otherwise no chronic ear disease.  She has also had thyroid surgery and ablation in March 2010.  The main reason she is here is progressive bilateral ear fullness and blockage of her hearing aids with cerumen.    At the March 2014 visit, she had been through quite an ordeal, having been hospitalized for E. Coli sepsis, the source was unknown. No new issues with that problem since then. And after asking her about it, it happened again this past June of 2015. Her past year had been fine, and other than fullness from her cerumen build up, no new ENT issues. No drainage from the ears, no bleeding, no vertigo. She still uses bilateral hearing aids.    About one week prior to the 6/29/18 visit, she noted some drainage from the LEFT ear.  She went to urgent care and was placed on some antibiotics.  Of note, she also got some pink eye.  However, she was not given drops because of a concern for drug interaction.  However, she was then placed on oral antibiotics.  On exam, the LEFT ear was completely filled with purulence, and cellulitic changes were starting in the external ear soft tissues and skin.  I treated her with debridement, ciprodex, and continued to the oral antibiotics.  Also, on exam after debridement I found a 10% perforation of the LEFT tympanic membrane.  At the follow up on 7/16, the infeciton was 90% cleared, but there was still some purulence on the LEFT tympanic membrane and the perforation was unchanged.  Therefore I had her continue cirpodex and she is here for follow up.    We are hoping this resolves, as she leaves for [place] for the winter, in late September.    At the 7/30/18 visit, we had turned a corner, and the infection was  totally cleared. The perforation was smaller as well, leaving only about a 5 % hole.  We were optimistic, and she is here for follow up prior to leaving for [place].    Of note, just at the end of July she was first diagnosed with Type 2 DM.  At the follow up on 8/28/18, unfortunately the LEFT ear had a full blown infection again and I debrided it.  The perforation was stable however.  And at the 9/10/18 visit, the canal was much better, and at the 10/8/18 visit the infection was totally resolved but the perforation was still there.  I asked her to retrun to me after returning from [place] for the winter, and she is here in follow up.    She has only sporadically used the preventive ciprodex since seeing me in October of 2018.    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     Prophylactic antibiotic     High risk medication use     Statin intolerance     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     SNHL (sensorineural hearing loss)     Abnormal liver function tests     Liver replaced by transplant (H)     Type 2 diabetes, HbA1c goal < 7% (H)     Hyperlipidemia LDL goal <70     Hypertension goal BP (blood pressure) < 140/80     Postoperative hypothyroidism     Type 2 diabetes mellitus with stage 3 chronic kidney disease, without long-term current use of insulin (H)     Age-related osteoporosis without current pathological fracture     Perforation of tympanic membrane, left     Other infective chronic otitis externa of left ear     CKD (chronic kidney disease) stage 3, GFR 30-59 ml/min (H)       Current Medications -   Current Outpatient Medications:      acetaminophen 500 MG CAPS, Take 1,000 mg by mouth nightly as needed Take 500-1,000 mg by mouth every 6 hours if needed. , Disp: , Rfl:      alirocumab (PRALUENT) 150 MG/ML  injectable pen, Inject 1 mL (150 mg) Subcutaneous every 14 days, Disp: 6 mL, Rfl: 3     blood glucose monitoring (NO BRAND SPECIFIED) meter device kit, Use to test blood sugar 2times daily or as directed., Disp: 1 kit, Rfl: 0     blood glucose monitoring (NO BRAND SPECIFIED) test strip, Use to test blood sugar 2 times daily, before breakfast and before bedtime, Disp: 200 each, Rfl: 3     calcitRIOL (ROCALTROL) 0.5 MCG capsule, Take 1 capsule (0.5 mcg) by mouth daily, Disp: 90 capsule, Rfl: 3     ciprofloxacin-dexamethasone (CIPRODEX) otic suspension, Place 4 drops Into the left ear three times a week, Disp: 5.6 mL, Rfl: 6     clotrimazole (LOTRIMIN) 1 % cream, Apply topically 2 times daily as needed Reported on 4/25/2017, Disp: , Rfl:      ELIQUIS 2.5 MG tablet, Take 1 tablet (2.5 mg) by mouth 2 times daily, Disp: 180 tablet, Rfl: 3     estradiol (ESTRACE VAGINAL) 0.1 MG/GM cream, Place 2 g vaginally twice a week, Disp: 42.5 g, Rfl: 11     ezetimibe (ZETIA) 10 MG tablet, TAKE 1 TABLET BY MOUTH EVERY DAY OR AS DIRECTED BY DR WOLF, Disp: 90 tablet, Rfl: 3     ferrous gluconate (FERGON) 324 (38 Fe) MG tablet, Take 1 tablet (324 mg) by mouth 3 times daily (with meals), Disp: 90 tablet, Rfl: 0     folic acid (FOLVITE) 1 MG tablet, Take 1 tablet (1 mg) by mouth daily, Disp: 100 tablet, Rfl: 3     furosemide (LASIX) 20 MG tablet, TAKE 1/2 TABLET BY MOUTH EVERY DAY, Disp: 45 tablet, Rfl: 3     hydrALAZINE (APRESOLINE) 25 MG tablet, Take 0.5 tablets (12.5 mg) by mouth 3 times daily as needed , if systolic blood pressure is more than 140 mmHg., Disp: 270 tablet, Rfl: 3     Hypromellose (ARTIFICIAL TEARS OP), Apply 1 drop to eye as needed, Disp: , Rfl:      ketoconazole (NIZORAL) 2 % cream, Apply topically 2 times daily To angles of mouth, Disp: 15 g, Rfl: 0     levothyroxine (SYNTHROID/LEVOTHROID) 150 MCG tablet, TAKE 1.5 TABLETS BY MOUTH ON MONDAYS. AND 1 TABLET DAILY, THE OTHER DAYS OF THE WEEK., Disp: 102 tablet, Rfl:  3     meclizine (ANTIVERT) 25 MG tablet, Take 1 tablet (25 mg) by mouth as needed, Disp: 30 tablet, Rfl: 11     metoprolol succinate (TOPROL-XL) 50 MG 24 hr tablet, Take 1 tablet (50 mg) by mouth daily, Disp: 90 tablet, Rfl: 3     metroNIDAZOLE (METROCREAM) 0.75 % cream, Apply topically 2 times daily, Disp: 45 g, Rfl: 5     Multiple Vitamins-Minerals (PRESERVISION AREDS 2) CAPS, Take 1 capsule by mouth 2 times daily, Disp: , Rfl:      nitroFURantoin, macrocrystal-monohydrate, (MACROBID) 100 MG capsule, Take 1 capsule (100 mg) by mouth 2 times daily For one week at onset of UTI symptoms, Disp: 14 capsule, Rfl: 6     omega-3 acid ethyl esters (LOVAZA) 1 g capsule, Take 1 capsule (1 g) by mouth 2 times daily, Disp: 360 capsule, Rfl: 3     predniSONE (DELTASONE) 5 MG tablet, Take 1 tablet (5 mg) by mouth daily, Disp: 90 tablet, Rfl: 3     RAPAMUNE (BRAND) 1 MG PO TABLET, Take 1 tablet (1 mg) by mouth every other day, Disp: 45 tablet, Rfl: 3     sertraline (ZOLOFT) 100 MG tablet, Take 1 tab (100mg) PO daily (Patient taking differently: Take 50 mg by mouth daily ), Disp: 90 tablet, Rfl: 0     SUMAtriptan (IMITREX) 50 MG tablet, Take 1 tablet (50 mg) by mouth at onset of headache for migraine repeat after 2 hours if needed., Disp: 30 tablet, Rfl: 3     triamcinolone (KENALOG) 0.1 % cream, Apply topically 2 times daily as needed for irritation, Disp: , Rfl:      ursodiol (ACTIGALL) 250 MG tablet, TAKE 1 TABLET BY MOUTH TWICE A DAY, Disp: 180 tablet, Rfl: 0     voriconazole (VFEND) 200 MG tablet, TAKE 1 TABLET BY MOUTH TWICE DAILY , Disp: 60 tablet, Rfl: 1     Wheat Dextrin (BENEFIBER) POWD, 2 teaspoons daily, Disp: , Rfl:     Allergies -   Allergies   Allergen Reactions     Fluconazole Hives and Itching     Ciprofloxacin Anxiety and Other (See Comments)     Irregular heart beat     Azithromycin Itching     Benadryl [Diphenhydramine Hcl]      Insomnia      Cellcept Diarrhea     Ciprofloxacin Other (See Comments)      "Insomnia, mood lability     Codeine      Psych disturbance     Codeine      Diphenhydramine Other (See Comments)     Doxycycline      Lansoprazole Diarrhea     Levaquin [Levofloxacin] Other (See Comments)     Headache, hyperactivity     Lisinopril Cough     Methotrexate      Sores     Methotrexate      Morphine Sulfate Itching     Mycophenolate Diarrhea     No Clinical Screening - See Comments      Simvastatin Muscle Pain (Myalgia)     severe     Cephalexin Rash     Fever and skin burning     Penicillin G Itching and Rash     Tolectin [Nsaids] Rash     Tramadol Rash       Social History -   Social History     Social History     Marital status: Legally      Spouse name: Robbin Thompson     Number of children: 4     Years of education: 20     Occupational History     [employer]  [employer]     social work      Self     Social History Main Topics     Smoking status: Former Smoker     Packs/day: 1.00     Years: 18.00     Types: Cigarettes     Quit date: 4/12/1985     Smokeless tobacco: Never Used     Alcohol use 0.0 oz/week     0 Standard drinks or equivalent per week      Comment: rare - \"I toast at weddings\"     Drug use: No     Sexual activity: Yes     Partners: Male     Birth control/ protection: Post-menopausal     Other Topics Concern     Exercise Yes     cardio and strengthing     Social History Narrative    She is retired. She and her  travel around the [place] in an RV. [place] in [place] the course of the [place]. 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       Family History -   Family History   Problem Relation Age of Onset     Hypertension Mother      Endometrial Cancer Mother      Hyperlipidemia Mother      Prostate Cancer Father      Macular Degeneration Father      Cancer - colorectal Maternal Grandmother         in her 80's, has surgery and removal of part of kidney, "  at age 98     Heart Disease Maternal Grandfather          at 98     Glaucoma Maternal Grandfather      Cerebrovascular Disease Paternal Grandmother         in her 80's     Hypertension Paternal Grandmother      Heart Disease Paternal Grandfather         MI     Alzheimer Disease Paternal Grandfather      Allergies Son      Neurologic Disorder Daughter         Migraines     Breast Cancer Other      Anesthesia Reaction No family hx of      Crohn's Disease No family hx of      Ulcerative Colitis No family hx of        Review of Systems - As per HPI and PMHx, otherwise 10+ system review of the head and neck, and general constitution is negative.    Physical Exam  /71   Pulse 68   Temp 98.3  F (36.8  C) (Oral)   Wt 80.7 kg (178 lb)   LMP 1988 (Approximate)   BMI 31.53 kg/m      General - The patient is well nourished and well developed, and appears to have good nutritional status.  Alert and oriented to person and place, answers questions and cooperates with examination appropriately.   Head and Face - Normocephalic and atraumatic, with no gross asymmetry noted of the contour of the facial features.  The facial nerve is intact, with strong symmetric movements.  Voice and Breathing - The patient was breathing comfortably without the use of accessory muscles. There was no wheezing, stridor, or stertor.  The patients voice was clear and strong, and had appropriate pitch and quality.  Ears - The RIGHT ear and RIGHT tympanic membrane are healthy and normal.  The LEFT  tympanic membrane is no longer edematous, and the perforation is stable at 10-15% in the posterior inferior quadrant.  Eyes - Extraocular movements intact, and the pupils were reactive to light.  Sclera were not icteric or injected, conjunctiva were pink and moist.        A/P - Luz Thompson is a 69 year old female  (H90.3) Sensorineural hearing loss (SNHL) of both ears  (primary encounter diagnosis)  (Z98.890) S/P  tympanoplasty  (H72.92) Perforation of tympanic membrane, left    The LEFT ear looks great again, but the tympanic membrane is no longer edematous but the perforation has not changed.    I recommend ciprodex once weekly indefinitely, and call me if there is need for refills.    Call if there is any drainage or sig of infection or sudden change in hearing.

## 2019-05-16 NOTE — LETTER
5/16/2019         RE: Luz Thompson  [address]  [place] [address]        Dear Colleague,    Thank you for referring your patient, Luz Thompson, to the [facility]. Please see a copy of my visit note below.    History of Present Illness - Luz Thompson is a 69 year old female here in close follow up from 10/8/2019, to check on the resolution of a LEFT otitis externa and otitis media, with a perforation.    To review, she has had LEFT ear surgery with a tympanoplasty in 1996.  Otherwise no chronic ear disease.  She has also had thyroid surgery and ablation in March 2010.  The main reason she is here is progressive bilateral ear fullness and blockage of her hearing aids with cerumen.    At the March 2014 visit, she had been through quite an ordeal, having been hospitalized for E. Coli sepsis, the source was unknown. No new issues with that problem since then. And after asking her about it, it happened again this past June of 2015. Her past year had been fine, and other than fullness from her cerumen build up, no new ENT issues. No drainage from the ears, no bleeding, no vertigo. She still uses bilateral hearing aids.    About one week prior to the 6/29/18 visit, she noted some drainage from the LEFT ear.  She went to urgent care and was placed on some antibiotics.  Of note, she also got some pink eye.  However, she was not given drops because of a concern for drug interaction.  However, she was then placed on oral antibiotics.  On exam, the LEFT ear was completely filled with purulence, and cellulitic changes were starting in the external ear soft tissues and skin.  I treated her with debridement, ciprodex, and continued to the oral antibiotics.  Also, on exam after debridement I found a 10% perforation of the LEFT tympanic membrane.  At the follow up on 7/16, the infeciton was 90% cleared, but there was still some purulence on the LEFT tympanic membrane and the  perforation was unchanged.  Therefore I had her continue cirpodex and she is here for follow up.    We are hoping this resolves, as she leaves for [place] for the winter, in late September.    At the 7/30/18 visit, we had turned a corner, and the infection was totally cleared. The perforation was smaller as well, leaving only about a 5 % hole.  We were optimistic, and she is here for follow up prior to leaving for [place].    Of note, just at the end of July she was first diagnosed with Type 2 DM.  At the follow up on 8/28/18, unfortunately the LEFT ear had a full blown infection again and I debrided it.  The perforation was stable however.  And at the 9/10/18 visit, the canal was much better, and at the 10/8/18 visit the infection was totally resolved but the perforation was still there.  I asked her to retrun to me after returning from [place] for the winter, and she is here in follow up.    She has only sporadically used the preventive ciprodex since seeing me in October of 2018.    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     Prophylactic antibiotic     High risk medication use     Statin intolerance     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     SNHL (sensorineural hearing loss)     Abnormal liver function tests     Liver replaced by transplant (H)     Type 2 diabetes, HbA1c goal < 7% (H)     Hyperlipidemia LDL goal <70     Hypertension goal BP (blood pressure) < 140/80     Postoperative hypothyroidism     Type 2 diabetes mellitus with stage 3 chronic kidney disease, without long-term current use of insulin (H)     Age-related osteoporosis without current pathological fracture     Perforation of tympanic membrane, left     Other infective chronic otitis externa of left ear     CKD (chronic kidney disease) stage 3, GFR  30-59 ml/min (H)       Current Medications -   Current Outpatient Medications:      acetaminophen 500 MG CAPS, Take 1,000 mg by mouth nightly as needed Take 500-1,000 mg by mouth every 6 hours if needed. , Disp: , Rfl:      alirocumab (PRALUENT) 150 MG/ML injectable pen, Inject 1 mL (150 mg) Subcutaneous every 14 days, Disp: 6 mL, Rfl: 3     blood glucose monitoring (NO BRAND SPECIFIED) meter device kit, Use to test blood sugar 2times daily or as directed., Disp: 1 kit, Rfl: 0     blood glucose monitoring (NO BRAND SPECIFIED) test strip, Use to test blood sugar 2 times daily, before breakfast and before bedtime, Disp: 200 each, Rfl: 3     calcitRIOL (ROCALTROL) 0.5 MCG capsule, Take 1 capsule (0.5 mcg) by mouth daily, Disp: 90 capsule, Rfl: 3     ciprofloxacin-dexamethasone (CIPRODEX) otic suspension, Place 4 drops Into the left ear three times a week, Disp: 5.6 mL, Rfl: 6     clotrimazole (LOTRIMIN) 1 % cream, Apply topically 2 times daily as needed Reported on 4/25/2017, Disp: , Rfl:      ELIQUIS 2.5 MG tablet, Take 1 tablet (2.5 mg) by mouth 2 times daily, Disp: 180 tablet, Rfl: 3     estradiol (ESTRACE VAGINAL) 0.1 MG/GM cream, Place 2 g vaginally twice a week, Disp: 42.5 g, Rfl: 11     ezetimibe (ZETIA) 10 MG tablet, TAKE 1 TABLET BY MOUTH EVERY DAY OR AS DIRECTED BY DR WOLF, Disp: 90 tablet, Rfl: 3     ferrous gluconate (FERGON) 324 (38 Fe) MG tablet, Take 1 tablet (324 mg) by mouth 3 times daily (with meals), Disp: 90 tablet, Rfl: 0     folic acid (FOLVITE) 1 MG tablet, Take 1 tablet (1 mg) by mouth daily, Disp: 100 tablet, Rfl: 3     furosemide (LASIX) 20 MG tablet, TAKE 1/2 TABLET BY MOUTH EVERY DAY, Disp: 45 tablet, Rfl: 3     hydrALAZINE (APRESOLINE) 25 MG tablet, Take 0.5 tablets (12.5 mg) by mouth 3 times daily as needed , if systolic blood pressure is more than 140 mmHg., Disp: 270 tablet, Rfl: 3     Hypromellose (ARTIFICIAL TEARS OP), Apply 1 drop to eye as needed, Disp: , Rfl:      ketoconazole  (NIZORAL) 2 % cream, Apply topically 2 times daily To angles of mouth, Disp: 15 g, Rfl: 0     levothyroxine (SYNTHROID/LEVOTHROID) 150 MCG tablet, TAKE 1.5 TABLETS BY MOUTH ON MONDAYS. AND 1 TABLET DAILY, THE OTHER DAYS OF THE WEEK., Disp: 102 tablet, Rfl: 3     meclizine (ANTIVERT) 25 MG tablet, Take 1 tablet (25 mg) by mouth as needed, Disp: 30 tablet, Rfl: 11     metoprolol succinate (TOPROL-XL) 50 MG 24 hr tablet, Take 1 tablet (50 mg) by mouth daily, Disp: 90 tablet, Rfl: 3     metroNIDAZOLE (METROCREAM) 0.75 % cream, Apply topically 2 times daily, Disp: 45 g, Rfl: 5     Multiple Vitamins-Minerals (PRESERVISION AREDS 2) CAPS, Take 1 capsule by mouth 2 times daily, Disp: , Rfl:      nitroFURantoin, macrocrystal-monohydrate, (MACROBID) 100 MG capsule, Take 1 capsule (100 mg) by mouth 2 times daily For one week at onset of UTI symptoms, Disp: 14 capsule, Rfl: 6     omega-3 acid ethyl esters (LOVAZA) 1 g capsule, Take 1 capsule (1 g) by mouth 2 times daily, Disp: 360 capsule, Rfl: 3     predniSONE (DELTASONE) 5 MG tablet, Take 1 tablet (5 mg) by mouth daily, Disp: 90 tablet, Rfl: 3     RAPAMUNE (BRAND) 1 MG PO TABLET, Take 1 tablet (1 mg) by mouth every other day, Disp: 45 tablet, Rfl: 3     sertraline (ZOLOFT) 100 MG tablet, Take 1 tab (100mg) PO daily (Patient taking differently: Take 50 mg by mouth daily ), Disp: 90 tablet, Rfl: 0     SUMAtriptan (IMITREX) 50 MG tablet, Take 1 tablet (50 mg) by mouth at onset of headache for migraine repeat after 2 hours if needed., Disp: 30 tablet, Rfl: 3     triamcinolone (KENALOG) 0.1 % cream, Apply topically 2 times daily as needed for irritation, Disp: , Rfl:      ursodiol (ACTIGALL) 250 MG tablet, TAKE 1 TABLET BY MOUTH TWICE A DAY, Disp: 180 tablet, Rfl: 0     voriconazole (VFEND) 200 MG tablet, TAKE 1 TABLET BY MOUTH TWICE DAILY , Disp: 60 tablet, Rfl: 1     Wheat Dextrin (BENEFIBER) POWD, 2 teaspoons daily, Disp: , Rfl:     Allergies -   Allergies   Allergen Reactions  "    Fluconazole Hives and Itching     Ciprofloxacin Anxiety and Other (See Comments)     Irregular heart beat     Azithromycin Itching     Benadryl [Diphenhydramine Hcl]      Insomnia      Cellcept Diarrhea     Ciprofloxacin Other (See Comments)     Insomnia, mood lability     Codeine      Psych disturbance     Codeine      Diphenhydramine Other (See Comments)     Doxycycline      Lansoprazole Diarrhea     Levaquin [Levofloxacin] Other (See Comments)     Headache, hyperactivity     Lisinopril Cough     Methotrexate      Sores     Methotrexate      Morphine Sulfate Itching     Mycophenolate Diarrhea     No Clinical Screening - See Comments      Simvastatin Muscle Pain (Myalgia)     severe     Cephalexin Rash     Fever and skin burning     Penicillin G Itching and Rash     Tolectin [Nsaids] Rash     Tramadol Rash       Social History -   Social History     Social History     Marital status: Legally      Spouse name: Robbin Thompson     Number of children: 4     Years of education: 20     Occupational History     [employer]  Joint [employer] between [employer]     social work      Self     Social History Main Topics     Smoking status: Former Smoker     Packs/day: 1.00     Years: 18.00     Types: Cigarettes     Quit date: 4/12/1985     Smokeless tobacco: Never Used     Alcohol use 0.0 oz/week     0 Standard drinks or equivalent per week      Comment: rare - \"I toast at weddings\"     Drug use: No     Sexual activity: Yes     Partners: Male     Birth control/ protection: Post-menopausal     Other Topics Concern     Exercise Yes     cardio and strengthing     Social History Narrative    She is retired. She and her  travel around the [place] in an RV. [place] course of the [place]. 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       Family History -   Family History   Problem Relation Age of Onset     " Hypertension Mother      Endometrial Cancer Mother      Hyperlipidemia Mother      Prostate Cancer Father      Macular Degeneration Father      Cancer - colorectal Maternal Grandmother         in her 80's, has surgery and removal of part of kidney,  at age 98     Heart Disease Maternal Grandfather          at 98     Glaucoma Maternal Grandfather      Cerebrovascular Disease Paternal Grandmother         in her 80's     Hypertension Paternal Grandmother      Heart Disease Paternal Grandfather         MI     Alzheimer Disease Paternal Grandfather      Allergies Son      Neurologic Disorder Daughter         Migraines     Breast Cancer Other      Anesthesia Reaction No family hx of      Crohn's Disease No family hx of      Ulcerative Colitis No family hx of        Review of Systems - As per HPI and PMHx, otherwise 10+ system review of the head and neck, and general constitution is negative.    Physical Exam  /71   Pulse 68   Temp 98.3  F (36.8  C) (Oral)   Wt 80.7 kg (178 lb)   LMP 1988 (Approximate)   BMI 31.53 kg/m       General - The patient is well nourished and well developed, and appears to have good nutritional status.  Alert and oriented to person and place, answers questions and cooperates with examination appropriately.   Head and Face - Normocephalic and atraumatic, with no gross asymmetry noted of the contour of the facial features.  The facial nerve is intact, with strong symmetric movements.  Voice and Breathing - The patient was breathing comfortably without the use of accessory muscles. There was no wheezing, stridor, or stertor.  The patients voice was clear and strong, and had appropriate pitch and quality.  Ears - The RIGHT ear and RIGHT tympanic membrane are healthy and normal.  The LEFT  tympanic membrane is no longer edematous, and the perforation is stable at 10-15% in the posterior inferior quadrant.  Eyes - Extraocular movements intact, and the pupils were reactive to  light.  Sclera were not icteric or injected, conjunctiva were pink and moist.        A/P - Luz Thompson is a 69 year old female  (H90.3) Sensorineural hearing loss (SNHL) of both ears  (primary encounter diagnosis)  (Z98.890) S/P tympanoplasty  (H72.92) Perforation of tympanic membrane, left    The LEFT ear looks great again, but the tympanic membrane is no longer edematous but the perforation has not changed.    I recommend ciprodex once weekly indefinitely, and call me if there is need for refills.    Call if there is any drainage or sig of infection or sudden change in hearing.      Again, thank you for allowing me to participate in the care of your patient.        Sincerely,        Randell Mejia MD

## 2019-05-20 ENCOUNTER — OFFICE VISIT (OUTPATIENT)
Dept: GASTROENTEROLOGY | Facility: CLINIC | Age: 69
End: 2019-05-20
Attending: INTERNAL MEDICINE
Payer: MEDICARE

## 2019-05-20 VITALS
OXYGEN SATURATION: 95 % | DIASTOLIC BLOOD PRESSURE: 76 MMHG | HEART RATE: 84 BPM | TEMPERATURE: 99.3 F | BODY MASS INDEX: 27.47 KG/M2 | HEIGHT: 68 IN | SYSTOLIC BLOOD PRESSURE: 159 MMHG

## 2019-05-20 DIAGNOSIS — E89.0 POSTOPERATIVE HYPOTHYROIDISM: ICD-10-CM

## 2019-05-20 DIAGNOSIS — Z94.4 LIVER REPLACED BY TRANSPLANT (H): ICD-10-CM

## 2019-05-20 DIAGNOSIS — E78.5 HYPERLIPIDEMIA LDL GOAL <70: ICD-10-CM

## 2019-05-20 DIAGNOSIS — Z79.899 HIGH RISK MEDICATION USE: ICD-10-CM

## 2019-05-20 DIAGNOSIS — B38.2 COCCIDIOIDAL PNEUMONITIS (H): ICD-10-CM

## 2019-05-20 DIAGNOSIS — Z94.4 LIVER REPLACED BY TRANSPLANT (H): Primary | ICD-10-CM

## 2019-05-20 LAB
ALBUMIN SERPL-MCNC: 3.2 G/DL (ref 3.4–5)
ALP SERPL-CCNC: 197 U/L (ref 40–150)
ALT SERPL W P-5'-P-CCNC: 36 U/L (ref 0–50)
ANION GAP SERPL CALCULATED.3IONS-SCNC: 7 MMOL/L (ref 3–14)
AST SERPL W P-5'-P-CCNC: 20 U/L (ref 0–45)
BILIRUB SERPL-MCNC: 0.2 MG/DL (ref 0.2–1.3)
BUN SERPL-MCNC: 23 MG/DL (ref 7–30)
CALCIUM SERPL-MCNC: 8.9 MG/DL (ref 8.5–10.1)
CHLORIDE SERPL-SCNC: 105 MMOL/L (ref 94–109)
CHOLEST SERPL-MCNC: 185 MG/DL
CO2 SERPL-SCNC: 26 MMOL/L (ref 20–32)
CREAT SERPL-MCNC: 1.14 MG/DL (ref 0.52–1.04)
CRP SERPL-MCNC: <2.9 MG/L (ref 0–8)
ERYTHROCYTE [DISTWIDTH] IN BLOOD BY AUTOMATED COUNT: 16.1 % (ref 10–15)
ERYTHROCYTE [SEDIMENTATION RATE] IN BLOOD BY WESTERGREN METHOD: 47 MM/H (ref 0–30)
GFR SERPL CREATININE-BSD FRML MDRD: 49 ML/MIN/{1.73_M2}
GLUCOSE SERPL-MCNC: 94 MG/DL (ref 70–99)
HCT VFR BLD AUTO: 37.2 % (ref 35–47)
HDLC SERPL-MCNC: 65 MG/DL
HGB BLD-MCNC: 11.9 G/DL (ref 11.7–15.7)
LDLC SERPL CALC-MCNC: 91 MG/DL
MCH RBC QN AUTO: 28.2 PG (ref 26.5–33)
MCHC RBC AUTO-ENTMCNC: 32 G/DL (ref 31.5–36.5)
MCV RBC AUTO: 88 FL (ref 78–100)
NONHDLC SERPL-MCNC: 120 MG/DL
PLATELET # BLD AUTO: 366 10E9/L (ref 150–450)
POTASSIUM SERPL-SCNC: 3.8 MMOL/L (ref 3.4–5.3)
PROT SERPL-MCNC: 7.4 G/DL (ref 6.8–8.8)
RBC # BLD AUTO: 4.22 10E12/L (ref 3.8–5.2)
SIROLIMUS BLD-MCNC: 5.5 UG/L (ref 5–15)
SODIUM SERPL-SCNC: 138 MMOL/L (ref 133–144)
T4 FREE SERPL-MCNC: 0.99 NG/DL (ref 0.76–1.46)
TME LAST DOSE: NORMAL H
TRIGL SERPL-MCNC: 146 MG/DL
TSH SERPL DL<=0.005 MIU/L-ACNC: 12.11 MU/L (ref 0.4–4)
WBC # BLD AUTO: 8.4 10E9/L (ref 4–11)

## 2019-05-20 PROCEDURE — G0463 HOSPITAL OUTPT CLINIC VISIT: HCPCS | Mod: ZF

## 2019-05-20 PROCEDURE — 84439 ASSAY OF FREE THYROXINE: CPT | Performed by: INTERNAL MEDICINE

## 2019-05-20 PROCEDURE — 84443 ASSAY THYROID STIM HORMONE: CPT | Performed by: INTERNAL MEDICINE

## 2019-05-20 PROCEDURE — 80053 COMPREHEN METABOLIC PANEL: CPT | Performed by: INTERNAL MEDICINE

## 2019-05-20 PROCEDURE — 36415 COLL VENOUS BLD VENIPUNCTURE: CPT | Performed by: INTERNAL MEDICINE

## 2019-05-20 PROCEDURE — 85652 RBC SED RATE AUTOMATED: CPT | Performed by: INTERNAL MEDICINE

## 2019-05-20 PROCEDURE — 86140 C-REACTIVE PROTEIN: CPT | Performed by: INTERNAL MEDICINE

## 2019-05-20 PROCEDURE — 80195 ASSAY OF SIROLIMUS: CPT | Performed by: INTERNAL MEDICINE

## 2019-05-20 PROCEDURE — 85027 COMPLETE CBC AUTOMATED: CPT | Performed by: INTERNAL MEDICINE

## 2019-05-20 PROCEDURE — 80061 LIPID PANEL: CPT | Performed by: INTERNAL MEDICINE

## 2019-05-20 PROCEDURE — 80299 QUANTITATIVE ASSAY DRUG: CPT | Performed by: INTERNAL MEDICINE

## 2019-05-20 ASSESSMENT — PAIN SCALES - GENERAL: PAINLEVEL: NO PAIN (0)

## 2019-05-20 NOTE — PROGRESS NOTES
I had the pleasure of seeing Luz Thompson for followup in the Liver Transplantation Clinic at the [facility] on 04/20/2019.  Ms. 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      She is doing relatively well at this visit.  She denies any abdominal pain, itching or skin rash.  She has only mild fatigue.  She denies any fevers or chills, cough or shortness of breath.  She denies any nausea or vomiting.  She has had some occasional diarrhea.  She had a rather severe bout when she was down in [place] that ended up hospitalizing her for just a day.  She denies any increased abdominal girth or lower extremity edema.  She states her appetite is good, and her weight is up a few pounds.      She did see Dr. 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  She did get blood work today, but it was done in [place] and is not yet available.     Current Outpatient Medications   Medication     acetaminophen 500 MG CAPS     alirocumab (PRALUENT) 150 MG/ML injectable pen     blood glucose monitoring (NO BRAND SPECIFIED) meter device kit     blood glucose monitoring (NO BRAND SPECIFIED) test strip     calcitRIOL (ROCALTROL) 0.5 MCG capsule     ciprofloxacin-dexamethasone (CIPRODEX) otic suspension     clotrimazole (LOTRIMIN) 1 % cream     ELIQUIS 2.5 MG tablet     estradiol (ESTRACE VAGINAL) 0.1 MG/GM cream     ezetimibe (ZETIA) 10 MG tablet     ferrous gluconate (FERGON) 324 (38 Fe) MG tablet     folic acid (FOLVITE) 1 MG tablet     furosemide (LASIX) 20 MG tablet     hydrALAZINE (APRESOLINE) 25 MG tablet     Hypromellose (ARTIFICIAL TEARS OP)     ketoconazole (NIZORAL) 2 % cream     levothyroxine (SYNTHROID/LEVOTHROID) 150 MCG tablet     meclizine (ANTIVERT) 25 MG tablet     metoprolol succinate (TOPROL-XL) 50 MG 24 hr tablet     metroNIDAZOLE (METROCREAM) 0.75 % cream     Multiple Vitamins-Minerals (PRESERVISION AREDS 2)  "CAPS     nitroFURantoin, macrocrystal-monohydrate, (MACROBID) 100 MG capsule     omega-3 acid ethyl esters (LOVAZA) 1 g capsule     predniSONE (DELTASONE) 5 MG tablet     RAPAMUNE (BRAND) 1 MG PO TABLET     sertraline (ZOLOFT) 100 MG tablet     SUMAtriptan (IMITREX) 50 MG tablet     triamcinolone (KENALOG) 0.1 % cream     ursodiol (ACTIGALL) 250 MG tablet     voriconazole (VFEND) 200 MG tablet     Wheat Dextrin (BENEFIBER) POWD     No current facility-administered medications for this visit.      /76   Pulse 84   Temp 99.3  F (37.4  C) (Oral)   Ht 1.715 m (5' 7.5\")   LMP 06/01/1988 (Approximate)   SpO2 95%   BMI 27.47 kg/m       In general she looks well.  HEENT exam shows no scleral icterus or temporal muscle wasting.  Her chest is clear.  His abdominal exam shows no increase in girth.  No masses or tenderness to palpation are present.  Her liver is 10 cm in span without left lobe enlargement.  No spleen tip is palpable.  Extremity exam shows no edema.  Skin exam shows no suspicious lesions.  Neurologic exam shows no asterixis.     Recent Results (from the past 168 hour(s))   Voriconazole Level    Collection Time: 05/20/19  9:57 AM   Result Value Ref Range    Voriconazole 0.4 (L) 1.0 - 5.5 ug/mL   CRP inflammation    Collection Time: 05/20/19  9:57 AM   Result Value Ref Range    CRP Inflammation <2.9 0.0 - 8.0 mg/L   Erythrocyte sedimentation rate auto    Collection Time: 05/20/19  9:57 AM   Result Value Ref Range    Sed Rate 47 (H) 0 - 30 mm/h   Lipid panel reflex to direct LDL Fasting    Collection Time: 05/20/19  9:57 AM   Result Value Ref Range    Cholesterol 185 <200 mg/dL    Triglycerides 146 <150 mg/dL    HDL Cholesterol 65 >49 mg/dL    LDL Cholesterol Calculated 91 <100 mg/dL    Non HDL Cholesterol 120 <130 mg/dL   Comprehensive metabolic panel    Collection Time: 05/20/19  9:57 AM   Result Value Ref Range    Sodium 138 133 - 144 mmol/L    Potassium 3.8 3.4 - 5.3 mmol/L    Chloride 105 94 - 109 " mmol/L    Carbon Dioxide 26 20 - 32 mmol/L    Anion Gap 7 3 - 14 mmol/L    Glucose 94 70 - 99 mg/dL    Urea Nitrogen 23 7 - 30 mg/dL    Creatinine 1.14 (H) 0.52 - 1.04 mg/dL    GFR Estimate 49 (L) >60 mL/min/[1.73_m2]    GFR Estimate If Black 56 (L) >60 mL/min/[1.73_m2]    Calcium 8.9 8.5 - 10.1 mg/dL    Bilirubin Total 0.2 0.2 - 1.3 mg/dL    Albumin 3.2 (L) 3.4 - 5.0 g/dL    Protein Total 7.4 6.8 - 8.8 g/dL    Alkaline Phosphatase 197 (H) 40 - 150 U/L    ALT 36 0 - 50 U/L    AST 20 0 - 45 U/L   T4 free    Collection Time: 05/20/19  9:57 AM   Result Value Ref Range    T4 Free 0.99 0.76 - 1.46 ng/dL   TSH    Collection Time: 05/20/19  9:57 AM   Result Value Ref Range    TSH 12.11 (H) 0.40 - 4.00 mU/L   Sirolimus level    Collection Time: 05/20/19  9:57 AM   Result Value Ref Range    Sirolimus Last Dose 5.19.19 1000 AM     Sirolimus Level 5.5 5.0 - 15.0 ug/L   CBC with platelets    Collection Time: 05/20/19  9:57 AM   Result Value Ref Range    WBC 8.4 4.0 - 11.0 10e9/L    RBC Count 4.22 3.8 - 5.2 10e12/L    Hemoglobin 11.9 11.7 - 15.7 g/dL    Hematocrit 37.2 35.0 - 47.0 %    MCV 88 78 - 100 fl    MCH 28.2 26.5 - 33.0 pg    MCHC 32.0 31.5 - 36.5 g/dL    RDW 16.1 (H) 10.0 - 15.0 %    Platelet Count 366 150 - 450 10e9/L   CT Chest w/o Contrast    Collection Time: 05/21/19 12:26 PM   Result Value Ref Range    Radiologist flags Lung nodule      My impression is that Ms. 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  She did see Dr. Montero already who did some additional tests today to see whether she needs to be switched in terms of her antifungal medication.  It is encouraging that her sed rate is somewhat low.  She does have some proteinuria as well and does need to follow up with a nephrologist.  Otherwise, her liver tests are unchanged.  I will not be making any change to her medical regimen.  I will see her back in the clinic again before she goes to  [place] in 6 months.  She is up-to-date with regard to vaccines and other cancer screening.      Thank you very much for allowing me to participate in the care of this patient.  If you have any questions regarding my recommendations, please do not hesitate to contact me.       Gentry Ramirez MD      Professor of Medicine  [employer]      Executive Medical Director, Solid Organ Transplant Program  [facility]

## 2019-05-20 NOTE — NURSING NOTE
"Chief Complaint   Patient presents with     RECHECK     liver tx     /76   Pulse 84   Temp 99.3  F (37.4  C) (Oral)   Ht 1.715 m (5' 7.5\")   LMP 06/01/1988 (Approximate)   SpO2 95%   BMI 27.47 kg/m    Marina Church CMA    "

## 2019-05-20 NOTE — LETTER
5/20/2019    RE: Luz Thompson  [address]  [place] [address]       I had the pleasure of seeing Luz Thompson for followup in the Liver Transplantation Clinic at the [facility] on 04/20/2019.  Ms. 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      She is doing relatively well at this visit.  She denies any abdominal pain, itching or skin rash.  She has only mild fatigue.  She denies any fevers or chills, cough or shortness of breath.  She denies any nausea or vomiting.  She has had some occasional diarrhea.  She had a rather severe bout when she was down in [place] that ended up hospitalizing her for just a day.  She denies any increased abdominal girth or lower extremity edema.  She states her appetite is good, and her weight is up a few pounds.      She did see Dr. 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  She did get blood work today, but it was done in [place] and is not yet available.     Current Outpatient Medications   Medication     acetaminophen 500 MG CAPS     alirocumab (PRALUENT) 150 MG/ML injectable pen     blood glucose monitoring (NO BRAND SPECIFIED) meter device kit     blood glucose monitoring (NO BRAND SPECIFIED) test strip     calcitRIOL (ROCALTROL) 0.5 MCG capsule     ciprofloxacin-dexamethasone (CIPRODEX) otic suspension     clotrimazole (LOTRIMIN) 1 % cream     ELIQUIS 2.5 MG tablet     estradiol (ESTRACE VAGINAL) 0.1 MG/GM cream     ezetimibe (ZETIA) 10 MG tablet     ferrous gluconate (FERGON) 324 (38 Fe) MG tablet     folic acid (FOLVITE) 1 MG tablet     furosemide (LASIX) 20 MG tablet     hydrALAZINE (APRESOLINE) 25 MG tablet     Hypromellose (ARTIFICIAL TEARS OP)     ketoconazole (NIZORAL) 2 % cream     levothyroxine (SYNTHROID/LEVOTHROID) 150 MCG tablet     meclizine (ANTIVERT) 25 MG tablet     metoprolol succinate (TOPROL-XL) 50 MG 24 hr tablet      "metroNIDAZOLE (METROCREAM) 0.75 % cream     Multiple Vitamins-Minerals (PRESERVISION AREDS 2) CAPS     nitroFURantoin, macrocrystal-monohydrate, (MACROBID) 100 MG capsule     omega-3 acid ethyl esters (LOVAZA) 1 g capsule     predniSONE (DELTASONE) 5 MG tablet     RAPAMUNE (BRAND) 1 MG PO TABLET     sertraline (ZOLOFT) 100 MG tablet     SUMAtriptan (IMITREX) 50 MG tablet     triamcinolone (KENALOG) 0.1 % cream     ursodiol (ACTIGALL) 250 MG tablet     voriconazole (VFEND) 200 MG tablet     Wheat Dextrin (BENEFIBER) POWD     No current facility-administered medications for this visit.      /76   Pulse 84   Temp 99.3  F (37.4  C) (Oral)   Ht 1.715 m (5' 7.5\")   LMP 06/01/1988 (Approximate)   SpO2 95%   BMI 27.47 kg/m        In general she looks well.  HEENT exam shows no scleral icterus or temporal muscle wasting.  Her chest is clear.  His abdominal exam shows no increase in girth.  No masses or tenderness to palpation are present.  Her liver is 10 cm in span without left lobe enlargement.  No spleen tip is palpable.  Extremity exam shows no edema.  Skin exam shows no suspicious lesions.  Neurologic exam shows no asterixis.     Recent Results (from the past 168 hour(s))   Voriconazole Level    Collection Time: 05/20/19  9:57 AM   Result Value Ref Range    Voriconazole 0.4 (L) 1.0 - 5.5 ug/mL   CRP inflammation    Collection Time: 05/20/19  9:57 AM   Result Value Ref Range    CRP Inflammation <2.9 0.0 - 8.0 mg/L   Erythrocyte sedimentation rate auto    Collection Time: 05/20/19  9:57 AM   Result Value Ref Range    Sed Rate 47 (H) 0 - 30 mm/h   Lipid panel reflex to direct LDL Fasting    Collection Time: 05/20/19  9:57 AM   Result Value Ref Range    Cholesterol 185 <200 mg/dL    Triglycerides 146 <150 mg/dL    HDL Cholesterol 65 >49 mg/dL    LDL Cholesterol Calculated 91 <100 mg/dL    Non HDL Cholesterol 120 <130 mg/dL   Comprehensive metabolic panel    Collection Time: 05/20/19  9:57 AM   Result Value Ref " Range    Sodium 138 133 - 144 mmol/L    Potassium 3.8 3.4 - 5.3 mmol/L    Chloride 105 94 - 109 mmol/L    Carbon Dioxide 26 20 - 32 mmol/L    Anion Gap 7 3 - 14 mmol/L    Glucose 94 70 - 99 mg/dL    Urea Nitrogen 23 7 - 30 mg/dL    Creatinine 1.14 (H) 0.52 - 1.04 mg/dL    GFR Estimate 49 (L) >60 mL/min/[1.73_m2]    GFR Estimate If Black 56 (L) >60 mL/min/[1.73_m2]    Calcium 8.9 8.5 - 10.1 mg/dL    Bilirubin Total 0.2 0.2 - 1.3 mg/dL    Albumin 3.2 (L) 3.4 - 5.0 g/dL    Protein Total 7.4 6.8 - 8.8 g/dL    Alkaline Phosphatase 197 (H) 40 - 150 U/L    ALT 36 0 - 50 U/L    AST 20 0 - 45 U/L   T4 free    Collection Time: 05/20/19  9:57 AM   Result Value Ref Range    T4 Free 0.99 0.76 - 1.46 ng/dL   TSH    Collection Time: 05/20/19  9:57 AM   Result Value Ref Range    TSH 12.11 (H) 0.40 - 4.00 mU/L   Sirolimus level    Collection Time: 05/20/19  9:57 AM   Result Value Ref Range    Sirolimus Last Dose 5.19.19 1000 AM     Sirolimus Level 5.5 5.0 - 15.0 ug/L   CBC with platelets    Collection Time: 05/20/19  9:57 AM   Result Value Ref Range    WBC 8.4 4.0 - 11.0 10e9/L    RBC Count 4.22 3.8 - 5.2 10e12/L    Hemoglobin 11.9 11.7 - 15.7 g/dL    Hematocrit 37.2 35.0 - 47.0 %    MCV 88 78 - 100 fl    MCH 28.2 26.5 - 33.0 pg    MCHC 32.0 31.5 - 36.5 g/dL    RDW 16.1 (H) 10.0 - 15.0 %    Platelet Count 366 150 - 450 10e9/L   CT Chest w/o Contrast    Collection Time: 05/21/19 12:26 PM   Result Value Ref Range    Radiologist flags Lung nodule      My impression is that Ms. 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  She did see Dr. Montero already who did some additional tests today to see whether she needs to be switched in terms of her antifungal medication.  It is encouraging that her sed rate is somewhat low.  She does have some proteinuria as well and does need to follow up with a nephrologist.  Otherwise, her liver tests are unchanged.  I will not be making any  change to her medical regimen.  I will see her back in the clinic again before she goes to [place] in 6 months.  She is up-to-date with regard to vaccines and other cancer screening.      Thank you very much for allowing me to participate in the care of this patient.  If you have any questions regarding my recommendations, please do not hesitate to contact me.       Gentry Ramirez MD      Professor of Medicine  [employer]      Executive Medical Director, Solid Organ Transplant Program  [facility]

## 2019-05-21 ENCOUNTER — OFFICE VISIT (OUTPATIENT)
Dept: SURGERY | Facility: CLINIC | Age: 69
End: 2019-05-21
Attending: CLINICAL NURSE SPECIALIST
Payer: MEDICARE

## 2019-05-21 ENCOUNTER — ANCILLARY PROCEDURE (OUTPATIENT)
Dept: CT IMAGING | Facility: CLINIC | Age: 69
End: 2019-05-21
Attending: CLINICAL NURSE SPECIALIST
Payer: MEDICARE

## 2019-05-21 VITALS
HEART RATE: 71 BPM | TEMPERATURE: 99.1 F | WEIGHT: 177.91 LBS | SYSTOLIC BLOOD PRESSURE: 135 MMHG | BODY MASS INDEX: 26.96 KG/M2 | RESPIRATION RATE: 16 BRPM | HEIGHT: 68 IN | OXYGEN SATURATION: 99 % | DIASTOLIC BLOOD PRESSURE: 69 MMHG

## 2019-05-21 DIAGNOSIS — R91.1 LUNG NODULE: Primary | ICD-10-CM

## 2019-05-21 DIAGNOSIS — R91.1 LUNG NODULE: ICD-10-CM

## 2019-05-21 LAB
RADIOLOGIST FLAGS: NORMAL
VORICONAZOLE SERPL-MCNC: 0.4 UG/ML (ref 1–5.5)

## 2019-05-21 PROCEDURE — G0463 HOSPITAL OUTPT CLINIC VISIT: HCPCS | Mod: ZF

## 2019-05-21 ASSESSMENT — MIFFLIN-ST. JEOR: SCORE: 1367.88

## 2019-05-21 ASSESSMENT — PAIN SCALES - GENERAL: PAINLEVEL: NO PAIN (0)

## 2019-05-21 NOTE — LETTER
5/21/2019     RE: Luz Thompson  [address]  [place] [address]     Dear Colleague,    Thank you for referring your patient, Luz Thompson, to the [facility]. Please see a copy of my visit note below.    THORACIC SURGERY FOLLOW UP VISIT    Dear Dr. Barraza,  I saw Ms. Luz Thompson in follow-up today. The clinical summary follows:     REASON FOR VISIT  Lung Nodule  INTERVAL STUDIES  Chest CT-final report pending at time of visit. Appears stable to my review.  CT today      CT 9/2018      ETOH rare  TOB former smoker, quit 1985, 18 pack years  BMI 27.4    SUBJECTIVE  [place] is doing well. She has only occasional SOB and mild cough but nothing new or worse from her baseline. In fact, she feels this may actually be better than before.    From a personal perspective, she has gotten  since she last saw us. She is also planning on moving to either [place] or [place] within the next few months. She is leaning towards [place] because she already owns property there.    IMPRESSION (R91.1) Lung nodule  (primary encounter diagnosis)  Comment: lung nodule follow up  Plan: no further follow up    69 year-old female with h/o coccidiomycosis and being followed for lung nodules.    PLAN  I spent a total of 25 minutes with Ms. Luz Thompson, more than 50% of which were spent in counseling, coordination of care, and face-to-face time. I reviewed the plan as follows:  The CT appears stable to my review which would mean the nodules have been stable for at least 2 years and no further follow up is indicated. I will call her with the final report once it is available.  Necessary Tests & Appointments: N/A  Pain Control Plan: N/A  Anticoagulation Plan: N/A  Smoking Cessation: N/A  All questions were answered and the patient and present family were in agreement with the plan.  I appreciate the opportunity to participate in the care of your patient and will keep you  updated.  Sincerely,    Cece Maynard, APRN CNS

## 2019-05-21 NOTE — PROGRESS NOTES
THORACIC SURGERY FOLLOW UP VISIT    Dear Dr. Barraza,  I saw Ms. Luz Thompson in follow-up today. The clinical summary follows:     REASON FOR VISIT  Lung Nodule  INTERVAL STUDIES  Chest CT-final report pending at time of visit. Appears stable to my review.  CT today      CT 9/2018      ETOH rare  TOB former smoker, quit 1985, 18 pack years  BMI 27.4    SUBJECTIVE  [place] is doing well. She has only occasional SOB and mild cough but nothing new or worse from her baseline. In fact, she feels this may actually be better than before.    From a personal perspective, she has gotten  since she last saw us. She is also planning on moving to either [place] or [place] within the next few months. She is leaning towards [place] because she already owns property there.    IMPRESSION (R91.1) Lung nodule  (primary encounter diagnosis)  Comment: lung nodule follow up  Plan: no further follow up    69 year-old female with h/o coccidiomycosis and being followed for lung nodules.    PLAN  I spent a total of 25 minutes with Ms. Luz Thompson, more than 50% of which were spent in counseling, coordination of care, and face-to-face time. I reviewed the plan as follows:  The CT appears stable to my review which would mean the nodules have been stable for at least 2 years and no further follow up is indicated. I will call her with the final report once it is available.  Necessary Tests & Appointments: N/A  Pain Control Plan: N/A  Anticoagulation Plan: N/A  Smoking Cessation: N/A  All questions were answered and the patient and present family were in agreement with the plan.  I appreciate the opportunity to participate in the care of your patient and will keep you updated.  Sincerely,

## 2019-05-21 NOTE — NURSING NOTE
"Oncology Rooming Note    May 21, 2019 12:50 PM   Luz Thompson is a 69 year old female who presents for:    Chief Complaint   Patient presents with     RECHECK     onc lung nodule      Initial Vitals: LMP 06/01/1988 (Approximate)  Estimated body mass index is 27.47 kg/m  as calculated from the following:    Height as of 5/20/19: 1.715 m (5' 7.5\").    Weight as of 5/16/19: 80.7 kg (178 lb). There is no height or weight on file to calculate BSA.  Data Unavailable Comment: Data Unavailable   Patient's last menstrual period was 06/01/1988 (approximate).  Allergies reviewed: Yes  Medications reviewed: Yes    Medications: Medication refills not needed today.  Pharmacy name entered into [facility]:    [facility] [id number] - [place] - [address] [facility] - [place], [place] - [address] [place] [address] [address] [id number] - [place] - [address] [place], [place] [address].  [facility] # [id number] - [place], [place] - [address] [facility] [address]    Clinical concerns: none        Arely Edvin, CMA              "

## 2019-05-22 ENCOUNTER — OFFICE VISIT (OUTPATIENT)
Dept: UROLOGY | Facility: CLINIC | Age: 69
End: 2019-05-22
Payer: MEDICARE

## 2019-05-22 ENCOUNTER — MYC MEDICAL ADVICE (OUTPATIENT)
Dept: INFECTIOUS DISEASES | Facility: CLINIC | Age: 69
End: 2019-05-22

## 2019-05-22 VITALS
SYSTOLIC BLOOD PRESSURE: 135 MMHG | WEIGHT: 179 LBS | HEIGHT: 67 IN | HEART RATE: 78 BPM | BODY MASS INDEX: 28.09 KG/M2 | DIASTOLIC BLOOD PRESSURE: 64 MMHG

## 2019-05-22 DIAGNOSIS — B38.2 COCCIDIOIDAL PNEUMONITIS (H): ICD-10-CM

## 2019-05-22 DIAGNOSIS — N95.2 ATROPHIC VAGINITIS: ICD-10-CM

## 2019-05-22 DIAGNOSIS — N39.0 RECURRENT UTI: Primary | ICD-10-CM

## 2019-05-22 LAB
APPEARANCE UR: ABNORMAL
BILIRUB UR QL: ABNORMAL
COLOR UR: YELLOW
GLUCOSE URINE: ABNORMAL MG/DL
HGB UR QL: ABNORMAL
KETONES UR QL: ABNORMAL MG/DL
LEUKOCYTE ESTERASE URINE: ABNORMAL
NITRITE UR QL STRIP: ABNORMAL
PH UR STRIP: 5.5 PH (ref 5–7)
PROTEIN ALBUMIN URINE: 100 MG/DL
SOURCE: ABNORMAL
SP GR UR STRIP: 1.01 (ref 1–1.03)
UROBILINOGEN UR QL STRIP: 0.2 EU/DL (ref 0.2–1)

## 2019-05-22 RX ORDER — ESTRADIOL 10 UG/1
10 INSERT VAGINAL
Qty: 8 TABLET | Refills: 11 | Status: SHIPPED | OUTPATIENT
Start: 2019-05-23 | End: 2019-08-27

## 2019-05-22 ASSESSMENT — MIFFLIN-ST. JEOR: SCORE: 1364.57

## 2019-05-22 ASSESSMENT — PAIN SCALES - GENERAL: PAINLEVEL: NO PAIN (0)

## 2019-05-22 NOTE — NURSING NOTE
Chief Complaint   Patient presents with     RECHECK     follow up symptom check       Rach Givens MA

## 2019-05-22 NOTE — PROGRESS NOTES
Reason for Visit:  F/u on urinary symptoms    Clinical Data:  Ms. Thompson is a 70 y/o female with PMH including liver transplant in 2002, CVA 2001 on chronic anticoagulation among other diagnoses, presenting for a yearly follow up regarding some hx of recurrent UTIs. During her appointment last year, she reported around 1 UTI yearly. She was given self start antibiotics last year b/c she travels extensively.     In the last year she has had five urinary tract infections. She took Macrobid on her own three times over this past year while in [place]. She was hospitalized twice in March and treated for a UTI both times. The first time she was hospitalized for 5 days d/t her UTI sx. Her second hospitalization at the end of March was 2/2 to a fall with trauma to the head in the bathroom and subsequently found to have a UTI. Both hospitalizations at [facility] in [place], [place].     Upon chart review, her last cystoscopy was in 2011 by myself and bladder/urethra within normal limits.     Physical Exam:  General: In NAD, comfortable sitting in chair  Neuro: Oriented x3  Psych: Mood congruent with affect  Resp: Comfortable on room air    CT chest w/out contrast 5/21/19  - L>R cortical atrophy of kidneys  - Subcentimeter cortical hypodensities too small to classfiy, likely cysts  - No calcified stones or hydronephrosis     Assessment and Plan:  Ms. Thompson is a 70 y/o female with PMH including liver transplant in 2002 and Coccidiodes infection diagnosed in 9948-9598 on voriconazole presenting for a yearly follow up regarding rUTIs. Pt with 5 UTIs this past year including two hospitalization in March in [place] d/t E coli pyelonephritis. We discussed future prophylactic treatment options for her rUTIs, which is complicated by her many drug interactions and allergies. Of note, she will soon be moving to [place], but plans on getting some of her continued care here this summer.   - Initiate Vagifem, will switch to  estrogen cream if her insurance does not cover former.   - Initiate cranberry supplement   - Will consider cystoscopy in the future   - Will consult with Dr. Montero to go over plan of care   - Follow up with Urology in August     Sc[place] Disclosure:   I, Allison Rubio, am serving as a scribe; to document services personally performed by Sandeep Waddell MD  - -based on data collection and the provider's statements to me.     Provider Disclosure:  I agree with above History, Review of Systems, Physical exam and Plan.  I have reviewed the content of the documentation and have edited it as needed. I have personally performed the services documented here and the documentation accurately represents those services and the decisions I have made.      Electronically signed by:  Sandeep Waddell MD

## 2019-05-22 NOTE — PATIENT INSTRUCTIONS
Ellura, NNMD code for 5% off your purchase - cranberry supplement to prevent UTIs    Please follow up in August 2019      It was a pleasure meeting with you today.  Thank you for allowing me and my team the privilege of caring for you today.  YOU are the reason we are here, and I truly hope we provided you with the excellent service you deserve.  Please let us know if there is anything else we can do for you so that we can be sure you are leaving completely satisfied with your care experience.

## 2019-05-22 NOTE — LETTER
5/22/2019     RE: Luz Thompson  [address]  [place] [address]     Dear Colleague,    Thank you for referring your patient, Luz Thompson, to the [facility] UROLOGY AND INST FOR PROSTATE AND UROLOGIC CANCERS at [facility]. Please see a copy of my visit note below.    Reason for Visit:  F/u on urinary symptoms    Clinical Data:  Ms. Thompson is a 68 y/o female with PMH including liver transplant in 2002, CVA 2001 on chronic anticoagulation among other diagnoses, presenting for a yearly follow up regarding some hx of recurrent UTIs. During her appointment last year, she reported around 1 UTI yearly. She was given self start antibiotics last year b/c she travels extensively.     In the last year she has had five urinary tract infections. She took Macrobid on her own three times over this past year while in [place]. She was hospitalized twice in March and treated for a UTI both times. The first time she was hospitalized for 5 days d/t her UTI sx. Her second hospitalization at the end of March was 2/2 to a fall with trauma to the head in the bathroom and subsequently found to have a UTI. Both hospitalizations at [facility] in [place], [place].     Upon chart review, her last cystoscopy was in 2011 by myself and bladder/urethra within normal limits.     Physical Exam:  General: In NAD, comfortable sitting in chair  Neuro: Oriented x3  Psych: Mood congruent with affect  Resp: Comfortable on room air    CT chest w/out contrast 5/21/19  - L>R cortical atrophy of kidneys  - Subcentimeter cortical hypodensities too small to classfiy, likely cysts  - No calcified stones or hydronephrosis     Assessment and Plan:  Ms. Thompson is a 68 y/o female with PMH including liver transplant in 2002 and Coccidiodes infection diagnosed in 8824-1685 on voriconazole presenting for a yearly follow up regarding rUTIs. Pt with 5 UTIs this past year including two  hospitalization in March in [place] d/t E coli pyelonephritis. We discussed future prophylactic treatment options for her rUTIs, which is complicated by her many drug interactions and allergies. Of note, she will soon be moving to [place], but plans on getting some of her continued care here this summer.   - Initiate Vagifem, will switch to estrogen cream if her insurance does not cover former.   - Initiate cranberry supplement   - Will consider cystoscopy in the future   - Will consult with Dr. Montero to go over plan of care   - Follow up with Urology in August     Scribe Disclosure:   I, Allison Rubio, am serving as a scribe; to document services personally performed by Sandeep Waddell MD  - -based on data collection and the provider's statements to me.     Provider Disclosure:  I agree with above History, Review of Systems, Physical exam and Plan.  I have reviewed the content of the documentation and have edited it as needed. I have personally performed the services documented here and the documentation accurately represents those services and the decisions I have made.      Electronically signed by:  Sandeep Waddell MD

## 2019-05-23 ENCOUNTER — TELEPHONE (OUTPATIENT)
Dept: UROLOGY | Facility: CLINIC | Age: 69
End: 2019-05-23

## 2019-05-23 NOTE — TELEPHONE ENCOUNTER
Prior Authorization Retail Medication Request    Medication/Dose: estradiol   ICD code (if different than what is on RX):  N30.20 and N95.2  Previously Tried and Failed:  Nothing first try  Rationale:  Help with chronic UTI's    Insurance Name:  Medicare  Insurance ID:  [id number]      Pharmacy Information (if different than what is on RX)  Name:  [facility]  Phone: [phone]

## 2019-05-24 NOTE — TELEPHONE ENCOUNTER
Central Prior Authorization Team   Phone: [phone]      PA Initiation    Medication: estradiol 10 mcg-PA initiated  Insurance Company: [employer] - Phone [phone] Fax [phone]  Pharmacy Filling the Rx: [id number] - [place] [facility] [address]  Filling Pharmacy Phone: [phone]  Filling Pharmacy Fax:    Start Date: 5/24/2019

## 2019-05-28 ENCOUNTER — TELEPHONE (OUTPATIENT)
Dept: UROLOGY | Facility: CLINIC | Age: 69
End: 2019-05-28

## 2019-05-28 DIAGNOSIS — C73 THYROID CANCER (H): ICD-10-CM

## 2019-05-28 DIAGNOSIS — Z94.4 LIVER REPLACED BY TRANSPLANT (H): ICD-10-CM

## 2019-05-28 RX ORDER — SIROLIMUS 1 MG
1 TABLET ORAL EVERY OTHER DAY
Qty: 45 TABLET | Refills: 3 | Status: ON HOLD | OUTPATIENT
Start: 2019-05-28 | End: 2019-08-31

## 2019-05-28 RX ORDER — VORICONAZOLE 200 MG/1
200 TABLET, FILM COATED ORAL 2 TIMES DAILY
Qty: 180 TABLET | Refills: 0 | Status: SHIPPED | OUTPATIENT
Start: 2019-05-28 | End: 2019-08-09

## 2019-05-28 RX ORDER — URSODIOL 250 MG/1
TABLET, FILM COATED ORAL
Qty: 180 TABLET | Refills: 3 | Status: ON HOLD | OUTPATIENT
Start: 2019-05-28 | End: 2019-08-31

## 2019-05-28 RX ORDER — PREDNISONE 5 MG/1
5 TABLET ORAL DAILY
Qty: 90 TABLET | Refills: 3 | Status: SHIPPED | OUTPATIENT
Start: 2019-05-28 | End: 2019-08-27

## 2019-05-28 NOTE — TELEPHONE ENCOUNTER
Sent message to dr malhotra to order something else for her medication denied by her insurance Amanda Canchola LPN Staff Nurse

## 2019-05-28 NOTE — TELEPHONE ENCOUNTER
See message below. New prescription for 3 month supply of voriconazole sent into pharmacy at [facility]. Patient updated.    Lamar Sampson RN  -------------------------------------------------------------------------      Crystal Montero MD  [facility] 12 hours ago (8:41 PM)      Yes, ok to send.    Routing comment       Gayatri Santiago RN Young, Jo-Anne Hertha, MD 5 days ago      I'm assuming for her vori. OK to send? Thanks.   Gayatri    Routing comment       Luz Thompson Jo-Anne Hertha, MD 6 days ago         Please send prescription into [facility].  Try for a 3 month supply   Thank you.   [place]

## 2019-05-28 NOTE — TELEPHONE ENCOUNTER
PRIOR AUTHORIZATION DENIED    Medication: estradiol 10 mcg-PA denied    Denial Date: 5/24/2019    Denial Rational: Must try/fail Premarin cream        Appeal Information:

## 2019-06-06 ENCOUNTER — TELEPHONE (OUTPATIENT)
Dept: INFECTIOUS DISEASES | Facility: CLINIC | Age: 69
End: 2019-06-06

## 2019-06-06 DIAGNOSIS — B38.2 COCCIDIOIDAL PNEUMONITIS (H): Primary | ICD-10-CM

## 2019-06-06 DIAGNOSIS — N30.20 BLADDER INFECTION, CHRONIC: ICD-10-CM

## 2019-06-06 DIAGNOSIS — Z94.4 LIVER REPLACED BY TRANSPLANT (H): ICD-10-CM

## 2019-06-06 DIAGNOSIS — Z79.899 HIGH RISK MEDICATION USE: ICD-10-CM

## 2019-06-06 RX ORDER — POSACONAZOLE 100 MG/1
300 TABLET, DELAYED RELEASE ORAL EVERY MORNING
Qty: 90 TABLET | Refills: 11 | Status: SHIPPED | OUTPATIENT
Start: 2019-06-06 | End: 2019-08-27

## 2019-06-06 RX ORDER — METHENAMINE HIPPURATE 1000 MG/1
1 TABLET ORAL AT BEDTIME
Qty: 30 TABLET | Refills: 11 | Status: SHIPPED | OUTPATIENT
Start: 2019-06-06 | End: 2019-08-27

## 2019-06-06 NOTE — TELEPHONE ENCOUNTER
[facility] Call Center    Phone Message    May a detailed message be left on voicemail: yes    Reason for Call: Medication Question or concern regarding medication   Prescription Clarification  Name of Medication: posaconazole (NOXAFIL) 100 MG EC tablet  Prescribing Provider: Young   Pharmacy:    [id number] - [place], [place] - [address] [facility]     What on the order needs clarification?Faviola pharmacist from [facility] pharmacy called and stated this new prescription has a big drug interaction with another med that pt is taking. Please call back Faviola carlson. Thanks.          Action Taken: Message routed to:  [facility] ([facility]): ID

## 2019-06-06 NOTE — PROGRESS NOTES
Rx sent through to change voriconazole to posaconazole, due to sun exposure when she lives in [place] in the winter. She will try to time the first pill of posaconazole right with last pills of voriconazole.    Rx sent through for Hiprex at bedtime, due to recurrent UTIs.    Crystal Montero MD  2:14 PM

## 2019-06-07 NOTE — TELEPHONE ENCOUNTER
See message below with Dr. Montero. Writer called pharmacist and let her know the below information.    Lamar Sampson RN  -------------------------------------------------------------------    .  Crystal Montero MD  You 38 minutes ago (1:49 PM)      Santi Newton~   She's already on voriconazole, which has the same interaction, which is why we want one to start as the other is ending (that is in my last note).   Thank you for letting the pharmacist know,   Crystal Obrien comment       You  Crystal Montero MD 22 hours ago (4:08 PM)      Dr. Montero,             I talked to the pharmacist and they said there is a large drug-drug interaction between sirolimus (Rapamune) and noxafil. Let me know if I can help with anything!     Thanks,   Lamar Sampson RN    Routing comment

## 2019-06-12 ENCOUNTER — TELEPHONE (OUTPATIENT)
Dept: INFECTIOUS DISEASES | Facility: CLINIC | Age: 69
End: 2019-06-12

## 2019-06-12 NOTE — TELEPHONE ENCOUNTER
Central Prior Authorization Team   Phone: [phone]      PA Initiation    Medication: posaconazole (NOXAFIL) 100 MG EC tablet-PA Pending  Insurance Company: [employer] - Phone [phone] Fax [phone]  Pharmacy Filling the Rx: [id number] - [place] - [address] [facility]  Filling Pharmacy Phone: [phone]  Filling Pharmacy Fax: [phone]  Start Date: 6/12/2019

## 2019-06-12 NOTE — TELEPHONE ENCOUNTER
Prior Authorization Retail Medication Request    Medication/Dose:   ICD code (if different than what is on RX):   Previously Tried and Failed/Rationale: Failed fluconazole, had to go to voriconazole. She is developing skin symptoms from voriconazole; she is a transplant patient and therefore at greater risk for skin cancers than normal patients, so she needs to be switched to posaconazole.     Insurance Name:  Medicare [id number], [facility] [id number]   Insurance ID:        Pharmacy Information (if different than what is on RX)  Name:    Phone:

## 2019-06-17 NOTE — TELEPHONE ENCOUNTER
Answered additional questions and faxed back as urgent. Also called [employer] to give additional information to expedite request. Ref# [id number].

## 2019-06-17 NOTE — TELEPHONE ENCOUNTER
Prior Authorization Approval    Authorization Effective Date: 6/17/2019  Authorization Expiration Date: 6/14/2021  Medication: posaconazole (NOXAFIL) 100 MG EC tablet-APPROVED  Approved Dose/Quantity:   Reference #: [id number]   Insurance Company: [employer] - Phone [phone] Fax [phone]  Expected CoPay:       CoPay Card Available:      Foundation Assistance Needed:    Which Pharmacy is filling the prescription (Not needed for infusion/clinic administered): [id number] - [place], [place] - [address] [facility]  Pharmacy Notified: Yes-Pharmacy will notify patient when ready. They will have to order medication in which will be in next day.  Patient Notified: No

## 2019-07-09 ENCOUNTER — OFFICE VISIT (OUTPATIENT)
Dept: FAMILY MEDICINE | Facility: CLINIC | Age: 69
End: 2019-07-09
Payer: MEDICARE

## 2019-07-09 VITALS
WEIGHT: 183 LBS | BODY MASS INDEX: 28.72 KG/M2 | TEMPERATURE: 98.5 F | HEART RATE: 86 BPM | HEIGHT: 67 IN | OXYGEN SATURATION: 98 % | SYSTOLIC BLOOD PRESSURE: 146 MMHG | RESPIRATION RATE: 18 BRPM | DIASTOLIC BLOOD PRESSURE: 76 MMHG

## 2019-07-09 DIAGNOSIS — H01.132 ECZEMATOUS DERMATITIS OF UPPER AND LOWER EYELIDS OF BOTH EYES: ICD-10-CM

## 2019-07-09 DIAGNOSIS — H01.131 ECZEMATOUS DERMATITIS OF UPPER AND LOWER EYELIDS OF BOTH EYES: ICD-10-CM

## 2019-07-09 DIAGNOSIS — H01.134 ECZEMATOUS DERMATITIS OF UPPER AND LOWER EYELIDS OF BOTH EYES: ICD-10-CM

## 2019-07-09 DIAGNOSIS — H01.135 ECZEMATOUS DERMATITIS OF UPPER AND LOWER EYELIDS OF BOTH EYES: ICD-10-CM

## 2019-07-09 DIAGNOSIS — M65.4 DE QUERVAIN'S DISEASE (TENOSYNOVITIS): Primary | ICD-10-CM

## 2019-07-09 PROCEDURE — 99214 OFFICE O/P EST MOD 30 MIN: CPT | Performed by: PHYSICIAN ASSISTANT

## 2019-07-09 RX ORDER — TRIAMCINOLONE ACETONIDE 0.25 MG/G
CREAM TOPICAL 2 TIMES DAILY
Qty: 15 G | Refills: 1 | Status: SHIPPED | OUTPATIENT
Start: 2019-07-09 | End: 2019-08-27

## 2019-07-09 ASSESSMENT — PAIN SCALES - GENERAL: PAINLEVEL: MODERATE PAIN (5)

## 2019-07-09 ASSESSMENT — MIFFLIN-ST. JEOR: SCORE: 1382.71

## 2019-07-09 NOTE — PROGRESS NOTES
Subjective     Luz Tohmpson is a 69 year old female who presents to clinic today for the following health issues:    HPI   Eye(s) Problem      Duration: < one week    Description:  Location: bilateral  Pain: no  Redness: YES of the eyelids  Discharge: no    Accompanying signs and symptoms: itchy    History (Trauma, foreign body exposure,): None    Precipitating or alleviating factors (contact use): None    Therapies tried and outcome: OTC stye ointment, did not help    Musculoskeletal problem/pain      Duration: 6 weeks    Description  Location: right wrist/thumb    Intensity:  moderate, severe    Accompanying signs and symptoms: weakness of hand/thumb, sharp and dull depending on movements. Pain radiating up the right arm when grabbing onto things.    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     Prophylactic antibiotic     High risk medication use     Statin intolerance     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     Sensorineural hearing loss (SNHL) of both ears     Abnormal liver function tests     Liver replaced by transplant (H)     Type 2 diabetes, HbA1c goal < 7% (H)     Hyperlipidemia LDL goal <70     Hypertension goal BP (blood pressure) < 140/80     Postoperative hypothyroidism     Type 2 diabetes mellitus with stage 3 chronic kidney disease, without long-term current use of insulin (H)     Age-related osteoporosis without current pathological fracture     Tympanic  "membrane perforation, left     Other infective chronic otitis externa of left ear     CKD (chronic kidney disease) stage 3, GFR 30-59 ml/min (H)     Past Surgical History:   Procedure Laterality Date     APPENDECTOMY       BIOPSY       CATARACT IOL, RT/LT      RE2013, LE12/10/2013 - Toric lenses     CHOLECYSTECTOMY       COLONOSCOPY  3/10/2014    Procedure: COLONOSCOPY;;  Surgeon: Gentry Ramirez MD;  Location: [facility]     ear drum repair       ENDOBRONCHIAL ULTRASOUND FLEXIBLE N/A 2017    Procedure: ENDOBRONCHIAL ULTRASOUND FLEXIBLE;  Flexible Bronchoscopy, Endobronchial Ultrasound, Transbronchial Needle Aspiration ;  Surgeon: Eden Clinton MD;  Location: [facility]     ENDOSCOPIC RETROGRADE CHOLANGIOPANCREATOGRAM  2013    Procedure: ENDOSCOPIC RETROGRADE CHOLANGIOPANCREATOGRAM;  Endoscopic Retrograde Cholangiopancreatogram with single balloon enteroscopy, ballon sweep of bile duct;  Surgeon: Brett Membreno MD;  Location: [facility]      KNEE SCOPE,MED/LAT MENISECTOMY  8/10/12    Right, partial medial menisectomy only     KNEE SURGERY  1966    R knee     PICC INSERTION  2013    4fr SL PASV PICC, 40cm (1cm external) in the R basilic vein w/ tip in the low SVC     PICC INSERTION  2014    5 fr DL BioFlo Navilyst PICC, 46 cm (3 cm external) in the L basilic vein w/ tip in the SVC RA junction.     THYROIDECTOMY  3/2010     TRANSPLANT LIVER RECIPIENT LIVING UNRELATED         Social History     Tobacco Use     Smoking status: Former Smoker     Packs/day: 1.00     Years: 18.00     Pack years: 18.00     Types: Cigarettes     Last attempt to quit: 1985     Years since quittin.2     Smokeless tobacco: Never Used   Substance Use Topics     Alcohol use: Yes     Alcohol/week: 0.0 oz     Comment: rare - \"I toast at weddings\"     Family History   Problem Relation Age of Onset     Hypertension Mother      Endometrial Cancer Mother      Hyperlipidemia Mother      Prostate Cancer Father  "     Macular Degeneration Father      Cancer - colorectal Maternal Grandmother         in her 80's, has surgery and removal of part of kidney,  at age 98     Heart Disease Maternal Grandfather          at 98     Glaucoma Maternal Grandfather      Cerebrovascular Disease Paternal Grandmother         in her 80's     Hypertension Paternal Grandmother      Heart Disease Paternal Grandfather         MI     Alzheimer Disease Paternal Grandfather      Allergies Son      Neurologic Disorder Daughter         Migraines     Breast Cancer Other      Anesthesia Reaction No family hx of      Crohn's Disease No family hx of      Ulcerative Colitis No family hx of          Current Outpatient Medications   Medication Sig Dispense Refill     acetaminophen 500 MG CAPS Take 1,000 mg by mouth nightly as needed Take 500-1,000 mg by mouth every 6 hours if needed.        alirocumab (PRALUENT) 150 MG/ML injectable pen Inject 1 mL (150 mg) Subcutaneous every 14 days 6 mL 3     blood glucose monitoring (NO BRAND SPECIFIED) meter device kit Use to test blood sugar 2times daily or as directed. 1 kit 0     blood glucose monitoring (NO BRAND SPECIFIED) test strip Use to test blood sugar 2 times daily, before breakfast and before bedtime 200 each 3     calcitRIOL (ROCALTROL) 0.5 MCG capsule Take 1 capsule (0.5 mcg) by mouth daily 90 capsule 3     ciprofloxacin-dexamethasone (CIPRODEX) otic suspension Place 4 drops Into the left ear three times a week 5.6 mL 6     ELIQUIS 2.5 MG tablet Take 1 tablet (2.5 mg) by mouth 2 times daily 180 tablet 3     estradiol (ESTRACE VAGINAL) 0.1 MG/GM cream Place 2 g vaginally twice a week 42.5 g 11     estradiol (VAGIFEM) 10 MCG TABS vaginal tablet Place 1 tablet (10 mcg) vaginally twice a week 8 tablet 11     ezetimibe (ZETIA) 10 MG tablet TAKE 1 TABLET BY MOUTH EVERY DAY OR AS DIRECTED BY DR WOLF 90 tablet 3     ferrous gluconate (FERGON) 324 (38 Fe) MG tablet Take 1 tablet (324 mg) by mouth 3 times  daily (with meals) 90 tablet 0     folic acid (FOLVITE) 1 MG tablet Take 1 tablet (1 mg) by mouth daily 100 tablet 3     furosemide (LASIX) 20 MG tablet TAKE 1/2 TABLET BY MOUTH EVERY DAY 45 tablet 3     hydrALAZINE (APRESOLINE) 25 MG tablet Take 0.5 tablets (12.5 mg) by mouth 3 times daily as needed , if systolic blood pressure is more than 140 mmHg. 270 tablet 3     Hypromellose (ARTIFICIAL TEARS OP) Apply 1 drop to eye as needed       levothyroxine (SYNTHROID/LEVOTHROID) 150 MCG tablet TAKE 1.5 TABLETS BY MOUTH ON MONDAYS. AND 1 TABLET DAILY, THE OTHER DAYS OF THE WEEK. 102 tablet 3     meclizine (ANTIVERT) 25 MG tablet Take 1 tablet (25 mg) by mouth as needed 30 tablet 11     methenamine (HIPREX) 1 g tablet Take 1 tablet (1 g) by mouth At Bedtime 30 tablet 11     metoprolol succinate (TOPROL-XL) 50 MG 24 hr tablet Take 1 tablet (50 mg) by mouth daily 90 tablet 3     Multiple Vitamins-Minerals (PRESERVISION AREDS 2) CAPS Take 1 capsule by mouth 2 times daily       omega-3 acid ethyl esters (LOVAZA) 1 g capsule Take 1 capsule (1 g) by mouth 2 times daily 360 capsule 3     order for DME Equipment being ordered: right brace with thumb 1 Device 0     posaconazole (NOXAFIL) 100 MG EC tablet Take 3 tablets (300 mg) by mouth every morning 90 tablet 11     predniSONE (DELTASONE) 5 MG tablet Take 1 tablet (5 mg) by mouth daily 90 tablet 3     RAPAMUNE (BRAND) 1 MG tablet Take 1 tablet (1 mg) by mouth every other day 45 tablet 3     sertraline (ZOLOFT) 100 MG tablet Take 1 tab (100mg) PO daily (Patient taking differently: Take 50 mg by mouth daily ) 90 tablet 0     SUMAtriptan (IMITREX) 50 MG tablet Take 1 tablet (50 mg) by mouth at onset of headache for migraine repeat after 2 hours if needed. 30 tablet 3     triamcinolone (KENALOG) 0.025 % cream Apply topically 2 times daily To eyelids 15 g 1     ursodiol (ACTIGALL) 250 MG tablet TAKE 1 TABLET BY MOUTH TWICE A  tablet 3     voriconazole (VFEND) 200 MG tablet Take  1 tablet (200 mg) by mouth 2 times daily 180 tablet 0     Wheat Dextrin (BENEFIBER) POWD 2 teaspoons daily       clotrimazole (LOTRIMIN) 1 % cream Apply topically 2 times daily as needed Reported on 4/25/2017       ketoconazole (NIZORAL) 2 % cream Apply topically 2 times daily To angles of mouth (Patient not taking: Reported on 7/9/2019) 15 g 0     metroNIDAZOLE (METROCREAM) 0.75 % cream Apply topically 2 times daily (Patient not taking: Reported on 7/9/2019) 45 g 5     nitroFURantoin, macrocrystal-monohydrate, (MACROBID) 100 MG capsule Take 1 capsule (100 mg) by mouth 2 times daily For one week at onset of UTI symptoms (Patient not taking: Reported on 7/9/2019) 14 capsule 6     triamcinolone (KENALOG) 0.1 % cream Apply topically 2 times daily as needed for irritation       Allergies   Allergen Reactions     Fluconazole Hives and Itching     Ciprofloxacin Anxiety and Other (See Comments)     Irregular heart beat     Azithromycin Itching     Benadryl [Diphenhydramine Hcl]      Insomnia      Cellcept Diarrhea     Ciprofloxacin Other (See Comments)     Insomnia, mood lability     Codeine      Psych disturbance     Codeine      Diphenhydramine Other (See Comments)     Doxycycline      Lansoprazole Diarrhea     Levaquin [Levofloxacin] Other (See Comments)     Headache, hyperactivity     Lisinopril Cough     Methotrexate      Sores     Methotrexate      Morphine Sulfate Itching     Mycophenolate Diarrhea     No Clinical Screening - See Comments      Simvastatin Muscle Pain (Myalgia)     severe     Cephalexin Rash     Fever and skin burning     Penicillin G Itching and Rash     Tolectin [Nsaids] Rash     Tramadol Rash         Reviewed and updated as needed this visit by Provider  Tobacco  Allergies  Meds  Problems  Med Hx  Surg Hx  Fam Hx         Review of Systems   ROS COMP: Constitutional, HEENT, cardiovascular, pulmonary, gi and gu systems are negative, except as otherwise noted.      Objective    /76 (BP  "Location: Left arm, Patient Position: Chair, Cuff Size: Adult Regular)   Pulse 86   Temp 98.5  F (36.9  C) (Oral)   Resp 18   Ht 1.702 m (5' 7\")   Wt 83 kg (183 lb)   LMP 06/01/1988 (Approximate)   SpO2 98%   BMI 28.66 kg/m    Body mass index is 28.66 kg/m .  Physical Exam   GENERAL: healthy, alert and no distress  EYES: eyelids- bilateral upper and lower eyelids-erythema, mild urticarial swelling and papules, dry skin  MS: right wrist: tenderness at the base of the thumb, Finkelstein maneuver is positive     Diagnostic Test Results:  Labs reviewed in Epic  none         Assessment & Plan       ICD-10-CM    1. De Quervain's disease (tenosynovitis) M65.4 ORTHO  REFERRAL     order for DME   2. Eczematous dermatitis of upper and lower eyelids of both eyes H01.131 triamcinolone (KENALOG) 0.025 % cream    H01.132     H01.135     H01.134      1.Wear brace   Make appointment with ortho for steroid injection  Continue Tylenol as needed      2.Triamcinolone apply to both upper and lower eyelids twice a day , avoid contact with eyes for 1 week   Follow up in 1 week if not better     BMI:   Estimated body mass index is 28.66 kg/m  as calculated from the following:    Height as of this encounter: 1.702 m (5' 7\").    Weight as of this encounter: 83 kg (183 lb).               Return in about 1 week (around 7/16/2019), or if symptoms worsen or fail to improve.    Suni Cai PA-C  [facility]      "

## 2019-07-15 ENCOUNTER — OFFICE VISIT (OUTPATIENT)
Dept: DERMATOLOGY | Facility: CLINIC | Age: 69
End: 2019-07-15
Payer: MEDICARE

## 2019-07-15 ENCOUNTER — DOCUMENTATION ONLY (OUTPATIENT)
Dept: CARE COORDINATION | Facility: CLINIC | Age: 69
End: 2019-07-15

## 2019-07-15 DIAGNOSIS — L71.9 ROSACEA: Primary | ICD-10-CM

## 2019-07-15 RX ORDER — METRONIDAZOLE 7.5 MG/G
GEL TOPICAL 2 TIMES DAILY
Qty: 45 G | Refills: 3 | Status: ON HOLD | OUTPATIENT
Start: 2019-07-15 | End: 2019-08-31

## 2019-07-15 ASSESSMENT — PAIN SCALES - GENERAL: PAINLEVEL: MILD PAIN (2)

## 2019-07-15 NOTE — PATIENT INSTRUCTIONS
Impression/Plan:  1. Acne rosacea, severe, could be precipitated by chronic steroid use, with periocular involvement.     Discussed that with her allergy list, antibiotic options are limited. I doubt that her long list of allergies are true allergies, but that is a for another time.    Oral doxy cannot be used due to possible allergic reactions. Will try topical treatment with metronidazole gel twice a day, benzoyl peroxide wash 1-2 and tobramycin ophthalmic ointment twice per day in the eyes.      Follow up in 2 weeks and maybe add tacrolimus cream as an anti-inflammatory. Alternatively, could consider oral metronidazole.     2. Multiple drug reactions to different medications including antibiotics    Will plan to test at least cephalosporins, penicillins, doxycycline, azithromycin, fluconazole    Plan for patch test intradermal pen G, ampicillin, ceftriaxone, cephazolin, cefpodoxime, fluconazole, azithromycin, doxycycline  3. Skin cancer screening    Benign SKs and no concerning lesions    ABCDs of melanoma were discussed and self skin checks were advised.     Sun precaution was advised including the use of sun screens of SPF 30 or higher, sun protective clothing, and avoidance of tanning beds.     Follow-up in 2 weeks, earlier for new or changing lesions.

## 2019-07-15 NOTE — LETTER
7/15/2019       RE: Luz Thompson  [address]  [place] [address]     Dear Colleague,    Thank you for referring your patient, Luz Thompson, to the [facility] DERMATOLOGY at [facility]. Please see a copy of my visit note below.    [facility] Dermatology Note    Dermatology Problem List:  1. S/p liver transplant for PBC (2002)   - current: sirolimus, prednisone   2. Pruritic papular skin eruption-self-induced, resolved  3. Mild atopic dermatitis- hands, thumbs, abdomen, back   - previous: Amlactin, patient discontinued   4. Actinic keratoses- right temple, left cheek, s/p LN2  5. Onychomycosis-  Oral voriconazole, prescribed by Dr. Montero ()   6. Drug hypersensitivity reaction- fluconazole  - residual lesions of abdomen and chest   7. Atrophe shanna    Encounter Date: Jul 15, 2019    CC:  Chief Complaint   Patient presents with     Derm Problem     [place] is here for skin check and eczema on her eyes          History of Present Illness:  Ms. Luz Thompson is a 69 year old female who for follow-up. She is status post liver transplant in 2002 and gets annual skin checks. It has been 1 year since her last skin check.     She has also been having eye redness and swelling of the lower eyelids. This began 1 week ago on the right eye and she thought it was a stye and started using a topical medication. It then began on the left and didn't respond to this same medication. It then got worse and she was seen by primary care. This was painful on the left until a day ago, the right was itchy and more mild. She was told that it was eczema. She describes it as underground pimples that eventually  She previously had something similar on the lower lips and slightly on the upper lip that improved with 2 weeks of a topical compound (unknown). She has multiple reported antibiotic allergies. Doxycycline caused her to itch during a hospital stay.  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     Sensorineural hearing loss (SNHL) of both ears     Abnormal liver function tests     Liver replaced by transplant (H)     Type 2 diabetes, HbA1c goal < 7% (H)     Hyperlipidemia LDL goal <70     Hypertension goal BP (blood pressure) < 140/80     Postoperative hypothyroidism     Type 2 diabetes mellitus with stage 3 chronic kidney disease, without long-term current use of insulin (H)     Age-related osteoporosis without current pathological fracture     Tympanic membrane perforation, left     Other infective chronic otitis externa of left ear     CKD (chronic kidney disease) stage 3, GFR 30-59 ml/min (H)     Past Medical History:   Diagnosis Date     Anemia of other chronic disease 10/17/2011     Anxiety      Bladder infection, chronic 4/4/2012     CKD (chronic kidney disease) stage 3, GFR 30-59 ml/min (H) 4/4/2012     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      Diverticulosis of sigmoid colon 12/21/2013     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     Received Rituxan during Summer of 2016     H/O esophageal varices      Hearing loss      Heart murmur 4/4/2012     History of DVT (deep vein thrombosis)      History of [place] fever      History of thyroid cancer 9/25/2012     Hyperlipidemia 4/10/2012    Says that she does not have it anymore, not on meds     Hyperlipidemia LDL goal <70      Hypertension goal BP (blood pressure) < 140/80 11/06/2013 "     Hypertriglyceridemia      Liver replaced by transplant (H) 10/17/2011    Dr. 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     Past Surgical History:   Procedure Laterality Date     APPENDECTOMY  1961     BIOPSY       CATARACT IOL, RT/LT      RE12/19/2013, LE12/10/2013 - Toric lenses     CHOLECYSTECTOMY  1991     COLONOSCOPY  3/10/2014    Procedure: COLONOSCOPY;;  Surgeon: Gentry Ramirez MD;  Location:  GI     ear drum repair       ENDOBRONCHIAL ULTRASOUND FLEXIBLE N/A 9/29/2017    Procedure: ENDOBRONCHIAL ULTRASOUND FLEXIBLE;  Flexible Bronchoscopy, Endobronchial Ultrasound, Transbronchial Needle Aspiration ;  Surgeon: Eden Clinton MD;  Location: [facility] OR     ENDOSCOPIC RETROGRADE CHOLANGIOPANCREATOGRAM  9/19/2013    Procedure: ENDOSCOPIC RETROGRADE CHOLANGIOPANCREATOGRAM;  Endoscopic Retrograde Cholangiopancreatogram with single balloon enteroscopy, ballon sweep of bile duct;  Surgeon: Brett Membreno MD;  Location:  OR      KNEE SCOPE,MED/LAT MENISECTOMY  8/10/12    Right, partial medial menisectomy only     KNEE SURGERY  1966    R knee     PICC INSERTION  9/18/2013    4fr SL PASV PICC, 40cm (1cm external) in the R basilic vein w/ tip in the low SVC     PICC INSERTION  2/21/2014    5 fr DL BioFlo Navilyst PICC, 46 cm (3 cm external) in the L basilic vein w/ tip in the SVC RA junction.     THYROIDECTOMY  3/2010     TRANSPLANT LIVER RECIPIENT LIVING UNRELATED         Social History:  Patient reports that she quit smoking about 34 years ago. Her smoking use included  cigarettes. She has a 18.00 pack-year smoking history. She has never used smokeless tobacco. She reports that she drinks alcohol. She reports that she does not use drugs.    Family History:  Family History   Problem Relation Age of Onset     Hypertension Mother      Endometrial Cancer Mother      Hyperlipidemia Mother      Prostate Cancer Father      Macular Degeneration Father      Cancer - colorectal Maternal Grandmother         in her 80's, has surgery and removal of part of kidney,  at age 98     Heart Disease Maternal Grandfather          at 98     Glaucoma Maternal Grandfather      Cerebrovascular Disease Paternal Grandmother         in her 80's     Hypertension Paternal Grandmother      Heart Disease Paternal Grandfather         MI     Alzheimer Disease Paternal Grandfather      Allergies Son      Neurologic Disorder Daughter         Migraines     Breast Cancer Other      Anesthesia Reaction No family hx of      Crohn's Disease No family hx of      Ulcerative Colitis No family hx of        Medications:  Current Outpatient Medications   Medication Sig Dispense Refill     acetaminophen 500 MG CAPS Take 1,000 mg by mouth nightly as needed Take 500-1,000 mg by mouth every 6 hours if needed.        alirocumab (PRALUENT) 150 MG/ML injectable pen Inject 1 mL (150 mg) Subcutaneous every 14 days 6 mL 3     blood glucose monitoring (NO BRAND SPECIFIED) meter device kit Use to test blood sugar 2times daily or as directed. 1 kit 0     blood glucose monitoring (NO BRAND SPECIFIED) test strip Use to test blood sugar 2 times daily, before breakfast and before bedtime 200 each 3     calcitRIOL (ROCALTROL) 0.5 MCG capsule Take 1 capsule (0.5 mcg) by mouth daily 90 capsule 3     ciprofloxacin-dexamethasone (CIPRODEX) otic suspension Place 4 drops Into the left ear three times a week 5.6 mL 6     clotrimazole (LOTRIMIN) 1 % cream Apply topically 2 times daily as needed Reported on 2017       ELIQUIS 2.5 MG tablet  Take 1 tablet (2.5 mg) by mouth 2 times daily 180 tablet 3     estradiol (ESTRACE VAGINAL) 0.1 MG/GM cream Place 2 g vaginally twice a week 42.5 g 11     estradiol (VAGIFEM) 10 MCG TABS vaginal tablet Place 1 tablet (10 mcg) vaginally twice a week 8 tablet 11     ezetimibe (ZETIA) 10 MG tablet TAKE 1 TABLET BY MOUTH EVERY DAY OR AS DIRECTED BY DR WOLF 90 tablet 3     ferrous gluconate (FERGON) 324 (38 Fe) MG tablet Take 1 tablet (324 mg) by mouth 3 times daily (with meals) 90 tablet 0     folic acid (FOLVITE) 1 MG tablet Take 1 tablet (1 mg) by mouth daily 100 tablet 3     furosemide (LASIX) 20 MG tablet TAKE 1/2 TABLET BY MOUTH EVERY DAY 45 tablet 3     hydrALAZINE (APRESOLINE) 25 MG tablet Take 0.5 tablets (12.5 mg) by mouth 3 times daily as needed , if systolic blood pressure is more than 140 mmHg. 270 tablet 3     Hypromellose (ARTIFICIAL TEARS OP) Apply 1 drop to eye as needed       levothyroxine (SYNTHROID/LEVOTHROID) 150 MCG tablet TAKE 1.5 TABLETS BY MOUTH ON MONDAYS. AND 1 TABLET DAILY, THE OTHER DAYS OF THE WEEK. 102 tablet 3     meclizine (ANTIVERT) 25 MG tablet Take 1 tablet (25 mg) by mouth as needed 30 tablet 11     methenamine (HIPREX) 1 g tablet Take 1 tablet (1 g) by mouth At Bedtime 30 tablet 11     metoprolol succinate (TOPROL-XL) 50 MG 24 hr tablet Take 1 tablet (50 mg) by mouth daily 90 tablet 3     Multiple Vitamins-Minerals (PRESERVISION AREDS 2) CAPS Take 1 capsule by mouth 2 times daily       nitroFURantoin, macrocrystal-monohydrate, (MACROBID) 100 MG capsule Take 1 capsule (100 mg) by mouth 2 times daily For one week at onset of UTI symptoms 14 capsule 6     omega-3 acid ethyl esters (LOVAZA) 1 g capsule Take 1 capsule (1 g) by mouth 2 times daily 360 capsule 3     order for DME Equipment being ordered: right brace with thumb 1 Device 0     posaconazole (NOXAFIL) 100 MG EC tablet Take 3 tablets (300 mg) by mouth every morning 90 tablet 11     predniSONE (DELTASONE) 5 MG tablet Take 1  tablet (5 mg) by mouth daily 90 tablet 3     RAPAMUNE (BRAND) 1 MG tablet Take 1 tablet (1 mg) by mouth every other day 45 tablet 3     sertraline (ZOLOFT) 100 MG tablet Take 1 tab (100mg) PO daily (Patient taking differently: Take 50 mg by mouth daily ) 90 tablet 0     SUMAtriptan (IMITREX) 50 MG tablet Take 1 tablet (50 mg) by mouth at onset of headache for migraine repeat after 2 hours if needed. 30 tablet 3     triamcinolone (KENALOG) 0.025 % cream Apply topically 2 times daily To eyelids 15 g 1     triamcinolone (KENALOG) 0.1 % cream Apply topically 2 times daily as needed for irritation       ursodiol (ACTIGALL) 250 MG tablet TAKE 1 TABLET BY MOUTH TWICE A  tablet 3     voriconazole (VFEND) 200 MG tablet Take 1 tablet (200 mg) by mouth 2 times daily 180 tablet 0     Wheat Dextrin (BENEFIBER) POWD 2 teaspoons daily       ketoconazole (NIZORAL) 2 % cream Apply topically 2 times daily To angles of mouth (Patient not taking: Reported on 7/9/2019) 15 g 0     metroNIDAZOLE (METROCREAM) 0.75 % cream Apply topically 2 times daily (Patient not taking: Reported on 7/9/2019) 45 g 5       Allergies   Allergen Reactions     Fluconazole Hives and Itching     Ciprofloxacin Anxiety and Other (See Comments)     Irregular heart beat     Azithromycin Itching     Benadryl [Diphenhydramine Hcl]      Insomnia      Cellcept Diarrhea     Ciprofloxacin Other (See Comments)     Insomnia, mood lability     Codeine      Psych disturbance     Codeine      Diphenhydramine Other (See Comments)     Doxycycline      Lansoprazole Diarrhea     Levaquin [Levofloxacin] Other (See Comments)     Headache, hyperactivity     Lisinopril Cough     Methotrexate      Sores     Methotrexate      Morphine Sulfate Itching     Mycophenolate Diarrhea     No Clinical Screening - See Comments      Simvastatin Muscle Pain (Myalgia)     severe     Cephalexin Rash     Fever and skin burning     Penicillin G Itching and Rash     Tolectin [Nsaids] Rash      Tramadol Rash       Review of Systems:  -As per HPI  -Constitutional: Otherwise feeling well today, in usual state of health.  -Skin: As above in HPI. No additional skin concerns.    Physical exam:  Vitals: LMP 06/01/1988 (Approximate)   GEN: This is a well developed, well-nourished female in no acute distress, in a pleasant mood.    SKIN: Focused examination of the face and eyes was performed.  -Erythema on both lower lids and on the left with erosions and a little bit of pus.   -Papules and some pustules over the cheeks, nose, nasolabial fold.   There are tan to brown waxy, stuck on papules located on the trunk.  -Dry skin diffusely  -Legs are mildly cold and cyanotic  -No other lesions of concern on areas examined.       Impression/Plan:  1. Acne rosacea, severe, could be precipitated by chronic steroid use, with periocular involvement.     Discussed that with her allergy list, antibiotic options are limited. I doubt that her long list of allergies are true allergies, but that is a for another time.    Oral doxy cannot be used due to possible allergic reactions. Will try topical treatment with metronidazole gel twice a day, benzoyl peroxide wash 1-2 and tobramycin ophthalmic ointment twice per day in the eyes.      Follow up in 2 weeks and maybe add tacrolimus cream as an anti-inflammatory. Alternatively, could consider oral metronidazole.    2. Multiple drug reactions to different medications including antibiotics    Will plan to test at least cephalosporins, penicillins, doxycycline, azithromycin, fluconazole    Plan for patch test intradermal pen G, ampicillin, ceftriaxone, cephazolin, cefpodoxime, fluconazole, azithromycin, doxycycline  3. Skin cancer screening    Benign SKs and no concerning lesions    ABCDs of melanoma were discussed and self skin checks were advised.     Sun precaution was advised including the use of sun screens of SPF 30 or higher, sun protective clothing, and avoidance of tanning  beds.    Follow-up in 2 weeks, earlier for new or changing lesions.       Staff Involved:  Scribe/Staff    Scribe Disclosure:   I, Arturo Montano, am serving as a scribe to document services personally performed by Dr. Will Rai, based on data collection and the provider's statements to me.     I spent a total of 25 minutes face to face with Luz Thompson during today s office visit. Over 50% of this time was spent counseling the patient and/or coordinating care. Please see Assessment and Plan for details.        Again, thank you for allowing me to participate in the care of your patient.      Sincerely,    Will Rai MD

## 2019-07-15 NOTE — NURSING NOTE
Dermatology Rooming Note    Luz Thompson's goals for this visit include:   Chief Complaint   Patient presents with     Derm Problem     [place] is here for skin check and eczema on her eyes      Kassie Philippe, CMA

## 2019-07-15 NOTE — PROGRESS NOTES
[facility] Dermatology Note    Dermatology Problem List:  1. S/p liver transplant for PBC (2002)   - current: sirolimus, prednisone   2. Pruritic papular skin eruption-self-induced, resolved  3. Mild atopic dermatitis- hands, thumbs, abdomen, back   - previous: Amlactin, patient discontinued   4. Actinic keratoses- right temple, left cheek, s/p LN2  5. Onychomycosis-  Oral voriconazole, prescribed by Dr. Montero (IM)   6. Drug hypersensitivity reaction- fluconazole  - residual lesions of abdomen and chest   7. Atrophe shanna    Encounter Date: Jul 15, 2019    CC:  Chief Complaint   Patient presents with     Derm Problem     [place] is here for skin check and eczema on her eyes          History of Present Illness:  Ms. Luz Thompson is a 69 year old female who for follow-up. She is status post liver transplant in 2002 and gets annual skin checks. It has been 1 year since her last skin check.     She has also been having eye redness and swelling of the lower eyelids. This began 1 week ago on the right eye and she thought it was a stye and started using a topical medication. It then began on the left and didn't respond to this same medication. It then got worse and she was seen by primary care. This was painful on the left until a day ago, the right was itchy and more mild. She was told that it was eczema. She describes it as underground pimples that eventually  She previously had something similar on the lower lips and slightly on the upper lip that improved with 2 weeks of a topical compound (unknown). She has multiple reported antibiotic allergies. Doxycycline caused her to itch during a hospital stay.       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     Prophylactic  "antibiotic     High risk medication use     Statin intolerance     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     Sensorineural hearing loss (SNHL) of both ears     Abnormal liver function tests     Liver replaced by transplant (H)     Type 2 diabetes, HbA1c goal < 7% (H)     Hyperlipidemia LDL goal <70     Hypertension goal BP (blood pressure) < 140/80     Postoperative hypothyroidism     Type 2 diabetes mellitus with stage 3 chronic kidney disease, without long-term current use of insulin (H)     Age-related osteoporosis without current pathological fracture     Tympanic membrane perforation, left     Other infective chronic otitis externa of left ear     CKD (chronic kidney disease) stage 3, GFR 30-59 ml/min (H)     Past Medical History:   Diagnosis Date     Anemia of other chronic disease 10/17/2011     Anxiety      Bladder infection, chronic 4/4/2012     CKD (chronic kidney disease) stage 3, GFR 30-59 ml/min (H) 4/4/2012     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      Diverticulosis of sigmoid colon 12/21/2013     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     Received Rituxan during Summer of 2016     H/O esophageal varices      Hearing loss      Heart murmur 4/4/2012     History of DVT (deep vein thrombosis)      History of [place] fever      History of thyroid cancer 9/25/2012     Hyperlipidemia 4/10/2012    Says that she does not have it anymore, not on meds     Hyperlipidemia LDL goal <70      Hypertension goal BP (blood pressure) < 140/80 11/06/2013     Hypertriglyceridemia      Liver replaced by transplant (H) 10/17/2011    Dr. 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     Past Surgical History:   Procedure Laterality Date     APPENDECTOMY  1961     BIOPSY       CATARACT IOL, RT/LT      RE12/19/2013, LE12/10/2013 - Toric lenses     CHOLECYSTECTOMY  1991     COLONOSCOPY  3/10/2014    Procedure: COLONOSCOPY;;  Surgeon: Gentry Ramirez MD;  Location: [facility]     ear drum repair       ENDOBRONCHIAL ULTRASOUND FLEXIBLE N/A 9/29/2017    Procedure: ENDOBRONCHIAL ULTRASOUND FLEXIBLE;  Flexible Bronchoscopy, Endobronchial Ultrasound, Transbronchial Needle Aspiration ;  Surgeon: Eden Clinton MD;  Location: [facility] OR     ENDOSCOPIC RETROGRADE CHOLANGIOPANCREATOGRAM  9/19/2013    Procedure: ENDOSCOPIC RETROGRADE CHOLANGIOPANCREATOGRAM;  Endoscopic Retrograde Cholangiopancreatogram with single balloon enteroscopy, ballon sweep of bile duct;  Surgeon: Brett Membreno MD;  Location: [facility] OR      KNEE SCOPE,MED/LAT MENISECTOMY  8/10/12    Right, partial medial menisectomy only     KNEE SURGERY  1966    R knee     PICC INSERTION  9/18/2013    4fr SL PASV PICC, 40cm (1cm external) in the R basilic vein w/ tip in the low SVC     PICC INSERTION  2/21/2014    5 fr DL BioFlo Navilyst PICC, 46 cm (3 cm external) in the L basilic vein w/ tip in the SVC RA junction.     THYROIDECTOMY  3/2010     TRANSPLANT LIVER RECIPIENT LIVING UNRELATED         Social History:  Patient reports that she quit smoking about 34 years ago. Her smoking use included cigarettes. She has a 18.00 pack-year smoking history. She has never used smokeless tobacco. She reports that she drinks alcohol. She reports that she does not use drugs.    Family History:  Family History   Problem Relation Age of Onset     Hypertension Mother      Endometrial Cancer Mother       Hyperlipidemia Mother      Prostate Cancer Father      Macular Degeneration Father      Cancer - colorectal Maternal Grandmother         in her 80's, has surgery and removal of part of kidney,  at age 98     Heart Disease Maternal Grandfather          at 98     Glaucoma Maternal Grandfather      Cerebrovascular Disease Paternal Grandmother         in her 80's     Hypertension Paternal Grandmother      Heart Disease Paternal Grandfather         MI     Alzheimer Disease Paternal Grandfather      Allergies Son      Neurologic Disorder Daughter         Migraines     Breast Cancer Other      Anesthesia Reaction No family hx of      Crohn's Disease No family hx of      Ulcerative Colitis No family hx of        Medications:  Current Outpatient Medications   Medication Sig Dispense Refill     acetaminophen 500 MG CAPS Take 1,000 mg by mouth nightly as needed Take 500-1,000 mg by mouth every 6 hours if needed.        alirocumab (PRALUENT) 150 MG/ML injectable pen Inject 1 mL (150 mg) Subcutaneous every 14 days 6 mL 3     blood glucose monitoring (NO BRAND SPECIFIED) meter device kit Use to test blood sugar 2times daily or as directed. 1 kit 0     blood glucose monitoring (NO BRAND SPECIFIED) test strip Use to test blood sugar 2 times daily, before breakfast and before bedtime 200 each 3     calcitRIOL (ROCALTROL) 0.5 MCG capsule Take 1 capsule (0.5 mcg) by mouth daily 90 capsule 3     ciprofloxacin-dexamethasone (CIPRODEX) otic suspension Place 4 drops Into the left ear three times a week 5.6 mL 6     clotrimazole (LOTRIMIN) 1 % cream Apply topically 2 times daily as needed Reported on 2017       ELIQUIS 2.5 MG tablet Take 1 tablet (2.5 mg) by mouth 2 times daily 180 tablet 3     estradiol (ESTRACE VAGINAL) 0.1 MG/GM cream Place 2 g vaginally twice a week 42.5 g 11     estradiol (VAGIFEM) 10 MCG TABS vaginal tablet Place 1 tablet (10 mcg) vaginally twice a week 8 tablet 11     ezetimibe (ZETIA) 10 MG tablet  TAKE 1 TABLET BY MOUTH EVERY DAY OR AS DIRECTED BY DR WOLF 90 tablet 3     ferrous gluconate (FERGON) 324 (38 Fe) MG tablet Take 1 tablet (324 mg) by mouth 3 times daily (with meals) 90 tablet 0     folic acid (FOLVITE) 1 MG tablet Take 1 tablet (1 mg) by mouth daily 100 tablet 3     furosemide (LASIX) 20 MG tablet TAKE 1/2 TABLET BY MOUTH EVERY DAY 45 tablet 3     hydrALAZINE (APRESOLINE) 25 MG tablet Take 0.5 tablets (12.5 mg) by mouth 3 times daily as needed , if systolic blood pressure is more than 140 mmHg. 270 tablet 3     Hypromellose (ARTIFICIAL TEARS OP) Apply 1 drop to eye as needed       levothyroxine (SYNTHROID/LEVOTHROID) 150 MCG tablet TAKE 1.5 TABLETS BY MOUTH ON MONDAYS. AND 1 TABLET DAILY, THE OTHER DAYS OF THE WEEK. 102 tablet 3     meclizine (ANTIVERT) 25 MG tablet Take 1 tablet (25 mg) by mouth as needed 30 tablet 11     methenamine (HIPREX) 1 g tablet Take 1 tablet (1 g) by mouth At Bedtime 30 tablet 11     metoprolol succinate (TOPROL-XL) 50 MG 24 hr tablet Take 1 tablet (50 mg) by mouth daily 90 tablet 3     Multiple Vitamins-Minerals (PRESERVISION AREDS 2) CAPS Take 1 capsule by mouth 2 times daily       nitroFURantoin, macrocrystal-monohydrate, (MACROBID) 100 MG capsule Take 1 capsule (100 mg) by mouth 2 times daily For one week at onset of UTI symptoms 14 capsule 6     omega-3 acid ethyl esters (LOVAZA) 1 g capsule Take 1 capsule (1 g) by mouth 2 times daily 360 capsule 3     order for DME Equipment being ordered: right brace with thumb 1 Device 0     posaconazole (NOXAFIL) 100 MG EC tablet Take 3 tablets (300 mg) by mouth every morning 90 tablet 11     predniSONE (DELTASONE) 5 MG tablet Take 1 tablet (5 mg) by mouth daily 90 tablet 3     RAPAMUNE (BRAND) 1 MG tablet Take 1 tablet (1 mg) by mouth every other day 45 tablet 3     sertraline (ZOLOFT) 100 MG tablet Take 1 tab (100mg) PO daily (Patient taking differently: Take 50 mg by mouth daily ) 90 tablet 0     SUMAtriptan (IMITREX) 50 MG  tablet Take 1 tablet (50 mg) by mouth at onset of headache for migraine repeat after 2 hours if needed. 30 tablet 3     triamcinolone (KENALOG) 0.025 % cream Apply topically 2 times daily To eyelids 15 g 1     triamcinolone (KENALOG) 0.1 % cream Apply topically 2 times daily as needed for irritation       ursodiol (ACTIGALL) 250 MG tablet TAKE 1 TABLET BY MOUTH TWICE A  tablet 3     voriconazole (VFEND) 200 MG tablet Take 1 tablet (200 mg) by mouth 2 times daily 180 tablet 0     Wheat Dextrin (BENEFIBER) POWD 2 teaspoons daily       ketoconazole (NIZORAL) 2 % cream Apply topically 2 times daily To angles of mouth (Patient not taking: Reported on 7/9/2019) 15 g 0     metroNIDAZOLE (METROCREAM) 0.75 % cream Apply topically 2 times daily (Patient not taking: Reported on 7/9/2019) 45 g 5       Allergies   Allergen Reactions     Fluconazole Hives and Itching     Ciprofloxacin Anxiety and Other (See Comments)     Irregular heart beat     Azithromycin Itching     Benadryl [Diphenhydramine Hcl]      Insomnia      Cellcept Diarrhea     Ciprofloxacin Other (See Comments)     Insomnia, mood lability     Codeine      Psych disturbance     Codeine      Diphenhydramine Other (See Comments)     Doxycycline      Lansoprazole Diarrhea     Levaquin [Levofloxacin] Other (See Comments)     Headache, hyperactivity     Lisinopril Cough     Methotrexate      Sores     Methotrexate      Morphine Sulfate Itching     Mycophenolate Diarrhea     No Clinical Screening - See Comments      Simvastatin Muscle Pain (Myalgia)     severe     Cephalexin Rash     Fever and skin burning     Penicillin G Itching and Rash     Tolectin [Nsaids] Rash     Tramadol Rash       Review of Systems:  -As per HPI  -Constitutional: Otherwise feeling well today, in usual state of health.  -Skin: As above in HPI. No additional skin concerns.    Physical exam:  Vitals: LMP 06/01/1988 (Approximate)   GEN: This is a well developed, well-nourished female in no  acute distress, in a pleasant mood.    SKIN: Focused examination of the face and eyes was performed.  -Erythema on both lower lids and on the left with erosions and a little bit of pus.   -Papules and some pustules over the cheeks, nose, nasolabial fold.   There are tan to brown waxy, stuck on papules located on the trunk.  -Dry skin diffusely  -Legs are mildly cold and cyanotic  -No other lesions of concern on areas examined.       Impression/Plan:  1. Acne rosacea, severe, could be precipitated by chronic steroid use, with periocular involvement.     Discussed that with her allergy list, antibiotic options are limited. I doubt that her long list of allergies are true allergies, but that is a for another time.    Oral doxy cannot be used due to possible allergic reactions. Will try topical treatment with metronidazole gel twice a day, benzoyl peroxide wash 1-2 and tobramycin ophthalmic ointment twice per day in the eyes.      Follow up in 2 weeks and maybe add tacrolimus cream as an anti-inflammatory. Alternatively, could consider oral metronidazole.    2. Multiple drug reactions to different medications including antibiotics    Will plan to test at least cephalosporins, penicillins, doxycycline, azithromycin, fluconazole    Plan for patch test intradermal pen G, ampicillin, ceftriaxone, cephazolin, cefpodoxime, fluconazole, azithromycin, doxycycline  3. Skin cancer screening    Benign SKs and no concerning lesions    ABCDs of melanoma were discussed and self skin checks were advised.     Sun precaution was advised including the use of sun screens of SPF 30 or higher, sun protective clothing, and avoidance of tanning beds.    Follow-up in 2 weeks, earlier for new or changing lesions.       Staff Involved:  Scribe/Staff    Scribe Disclosure:   I, Arturo Montano, am serving as a scribe to document services personally performed by Dr. Will Rai, based on data collection and the provider's statements to me.     I  spent a total of 25 minutes face to face with Luz Thompson during today s office visit. Over 50% of this time was spent counseling the patient and/or coordinating care. Please see Assessment and Plan for details.

## 2019-07-17 NOTE — TELEPHONE ENCOUNTER
RECORDS RECEIVED FROM: elavated creatining levels --diabetes post liver tx    DATE RECEIVED: 07.24.2019   NOTES STATUS DETAILS   OFFICE NOTE from referring provider Internal 05.15.2019   OFFICE NOTE from other specialist  N/A    *Only VASCULITIS or LUPUS gather office notes for the following N/A    *PULMONARY   N/A    *ENT N/A    *DERMATOLOGY N/A    *RHEUMATOLOGY N/A    DISCHARGE SUMMARY from hospital N/A    DISCHARGE REPORT from the ER N/A    MEDICATION LIST Internal    IMAGING  (NEED IMAGES AND REPORTS)     KIDNEY CT SCAN Care Everywhere 03.18.2019   KIDNEY ULTRASOUND N/A    LABS     CBC Internal 05.20.2019   CMP Internal 05.20.2019   BMP Care Everywhere 03.02.2019   UA Care Everywhere 02.28.2019   URINE PROTEIN Care Everywhere 10.16.2018   RENAL PANEL N/A    BIOPSY     KIDNEY BIOPSY  N/A

## 2019-07-19 DIAGNOSIS — Z94.4 LIVER REPLACED BY TRANSPLANT (H): ICD-10-CM

## 2019-07-19 DIAGNOSIS — Z13.220 LIPID SCREENING: ICD-10-CM

## 2019-07-23 DIAGNOSIS — H40.003 GLAUCOMA SUSPECT OF BOTH EYES: Primary | ICD-10-CM

## 2019-07-24 ENCOUNTER — OFFICE VISIT (OUTPATIENT)
Dept: OPHTHALMOLOGY | Facility: CLINIC | Age: 69
End: 2019-07-24
Attending: OPHTHALMOLOGY
Payer: MEDICARE

## 2019-07-24 ENCOUNTER — PRE VISIT (OUTPATIENT)
Dept: NEPHROLOGY | Facility: CLINIC | Age: 69
End: 2019-07-24

## 2019-07-24 DIAGNOSIS — Z96.1 PSEUDOPHAKIA, BOTH EYES: Primary | ICD-10-CM

## 2019-07-24 DIAGNOSIS — H04.123 DRY EYES, BILATERAL: ICD-10-CM

## 2019-07-24 DIAGNOSIS — H35.3132 INTERMEDIATE STAGE NONEXUDATIVE AGE-RELATED MACULAR DEGENERATION OF BOTH EYES: ICD-10-CM

## 2019-07-24 DIAGNOSIS — H35.3132 INTERMEDIATE STAGE NONEXUDATIVE AGE-RELATED MACULAR DEGENERATION OF BOTH EYES: Primary | ICD-10-CM

## 2019-07-24 DIAGNOSIS — H40.003 GLAUCOMA SUSPECT OF BOTH EYES: ICD-10-CM

## 2019-07-24 PROCEDURE — 92083 EXTENDED VISUAL FIELD XM: CPT | Mod: ZF | Performed by: OPHTHALMOLOGY

## 2019-07-24 PROCEDURE — 92134 CPTRZ OPH DX IMG PST SGM RTA: CPT | Mod: ZF | Performed by: OPHTHALMOLOGY

## 2019-07-24 PROCEDURE — 40000269 ZZH STATISTIC NO CHARGE FACILITY FEE: Mod: ZF

## 2019-07-24 PROCEDURE — 92133 CPTRZD OPH DX IMG PST SGM ON: CPT | Mod: ZF | Performed by: OPHTHALMOLOGY

## 2019-07-24 PROCEDURE — 92250 FUNDUS PHOTOGRAPHY W/I&R: CPT | Mod: ZF | Performed by: OPHTHALMOLOGY

## 2019-07-24 PROCEDURE — G0463 HOSPITAL OUTPT CLINIC VISIT: HCPCS | Mod: ZF

## 2019-07-24 ASSESSMENT — VISUAL ACUITY
OS_SC: 20/20
METHOD: SNELLEN - LINEAR
OS_SC+: -1
OS_SC+: -1
METHOD_MR: PT DECLINED MR TODAY
METHOD_MR: PT DECLINED MR TODAY
OD_SC+: -1
METHOD: SNELLEN - LINEAR
OD_SC: 20/25 SLOW
OD_SC+: -1
OD_SC: 20/25 SLOW
OS_SC: 20/20

## 2019-07-24 ASSESSMENT — CONF VISUAL FIELD
OS_NORMAL: 1
OS_NORMAL: 1
OD_NORMAL: 1
OD_NORMAL: 1
METHOD: COUNTING FINGERS

## 2019-07-24 ASSESSMENT — EXTERNAL EXAM - RIGHT EYE
OD_EXAM: NORMAL
OD_EXAM: NORMAL

## 2019-07-24 ASSESSMENT — TONOMETRY
OS_IOP_MMHG: 10
OD_IOP_MMHG: 10
IOP_METHOD: APPLANATION
IOP_METHOD: APPLANATION
OD_IOP_MMHG: 10
OS_IOP_MMHG: 10

## 2019-07-24 ASSESSMENT — CUP TO DISC RATIO
OD_RATIO: 0.5
OD_RATIO: 0.5
OS_RATIO: 0.5
OS_RATIO: 0.5

## 2019-07-24 ASSESSMENT — EXTERNAL EXAM - LEFT EYE
OS_EXAM: NORMAL
OS_EXAM: NORMAL

## 2019-07-24 NOTE — PROGRESS NOTES
CC: follow up nonexudative AMD    HPI: Luz Thompson is a  66 year old year-old patient with history of nonexudative AMD both eyes presenting for follow up.  Has noticed mild decrease in vision both eyes.     RETINAL IMAGING  Optical Coherence Tomography 7.24-19  Right eye - RPE changes nasal to fovea, drusen, trace Epiretinal membrane   Left eye- RPE changes nasal to fovea, drusen, trace Epiretinal membrane     Autofluorescence 7.24-19  Right eye-hyperautofluorescent spots indicating drusen and RPE changes   Left eye--hyperautofluorescent spots indicating drusen and RPE changes      Assessment & Plan:  1. Nonexudative senile macular degeneration of retina  - Bilateral drusen  - On AREDS (usure of formulation) - recommended AREDS2 given smoking history (stopped 1985)  - Continue Amsler    2. ERM OD  - Mild, not likely visually significant   - Observe    3. Pseudophakia of both eyes  - PCO both eyes right >left  - s/p Yag capsulotomy with Dr Jorgensen last year    4. Posterior vitreous detachment, bilateral  - Retinal detachment precautions given (need to return for immediate exam for increasing flashes or light, floaters, worsening vision, or the appearance of a shadow or curtain in the vision)    5. Glaucoma suspect  (+) grandfather with glaucoma   Plan for glaucoma evaluation   Follow with Dr. Tovar     Return in 12 months or sooner as needed  Optical Coherence Tomography and Autofluorescence   Patient not interested in stem cell research      ~~~~~~~~~~~~~~~~~~~~~~~~~~~~~~~~~~   Complete documentation of historical and exam elements from today's encounter can be found in the full encounter summary report (not reduplicated in this progress note).  I personally obtained the chief complaint(s) and history of present illness.  I confirmed and edited as necessary the review of systems, past medical/surgical history, family history, social history, and examination findings as documented by others; and I examined  the patient myself.  I personally reviewed the relevant tests, images, and reports as documented above.  I formulated and edited as necessary the assessment and plan and discussed the findings and management plan with the patient and family    Meri Frost MD  .  Retina Service   Department of Ophthalmology and Visual Neurosciences   [facility]  Phone: [phone]   Fax: [phone]

## 2019-07-24 NOTE — NURSING NOTE
"Chief Complaint(s) and History of Present Illness(es)     Glaucoma Follow-Up     In both eyes.  Associated symptoms include floaters (Intermittent floaters x years BE, no new concerns with current floaters. ).  Negative for dryness, eye pain, redness, tearing and flashes.  Pain was noted as 0/10.              Comments     Pt is here for her yearly Glaucoma f/u. Pt notes vision has been about the same each eye. Pt c/o she has had some bouts of rosacea around her lids x 2 weeks. Also, 2 weeks ago LE swelled shut due to the rosacea and was really sore to the touch. There was also huge red \"blotches\" on face (around cheeks and forehead). Today, swelling has gone down, and the rosacea and clamed down a little bit. BE are itchy x the last few days. Pt is type 2 diabetic not on any meds, and is controlling it with diet.     Last A1C was 6.4 taken about 6 weeks ago  Lab Results       Component                Value               Date                       A1C                      6.4                 10/16/2018                 A1C                      6.8                 05/18/2018                Alana Aviles, [facility] 9:26 AM July 24, 2019                       "

## 2019-07-24 NOTE — NURSING NOTE
"Chief Complaint(s) and History of Present Illness(es)     Macular Degeneration Follow Up     In both eyes.  Associated symptoms include floaters (Intermittent floaters x years in BE, no new changes).  Negative for dryness, eye pain, redness, tearing and flashes.              Comments     Pt is here for her yearly Macular Degeneration f/u.  Pt notes vision has been about the same each eye. Pt c/o she has had some bouts of rosacea around her lids x 2 weeks. Also, 2 weeks ago LE swelled shut due to the rosacea and was really sore to the touch. There was also huge red \"blotches\" on face (around cheeks and forehead). Today, swelling has gone down, and the rosacea and clamed down a little bit. BE are itchy x the last few days. Pt is type 2 diabetic not on any meds, and is controlling it with diet.     Last A1C was 6.4 taken about 6 weeks ago  Lab Results       Component                Value               Date                       A1C                      6.4                 10/16/2018                 A1C                      6.8                 05/18/2018                Alana Aviles, [facility] 9:26 AM July 24, 2019                     "

## 2019-07-24 NOTE — PROGRESS NOTES
HPI  Luz Thompson is a 69 year old female here for follow-up of glaucoma suspect both eyes. The vision is overall good and she denies flashes/floaters. She is also seeing Dr. Frost today.     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  (primary encounter diagnosis)  Comment: Based on asymmetric disc appearance with thin temporal rim left eye    FH: + grandfather  Pachymetry: 577/579 (2016)  Gonioscopy:  Tmax: High teens OU  Today's IOP: 10/10  Target IOP:  Current medications: None  Meds to avoid: None  Visual field: OVF 24-2 (7/24/2019)  OD: Few depressed points on pattern deviation, MD -3.0, PSD 2.4  OS: Inf depression, MD -4.8, PSD 3.9  Nerve OCT: Spectralis optic nerve OCT (7/24/2019)  OD: Avg RNFL thickness 88, all green  OS: Avg RNFL thickness 86, superior red, rest green   - stable    Normal IOPs today with stable ONH appearance, stable VF and nerve OCT both eyes.    Plan: Observe for now. Repeat OVF 24-2 and nerve OCT at next visit.    (H35.31) Age-related macular degeneration, dry, both eyes  Comment: Followed by Dr. Frost, seen today. Bilateral drusen. No heme or SRF.  Plan: Continue AREDS2 and Amsler grid monitoring per retina clinic.    (H35.371) Epiretinal membrane, right eye  Comment: Mild  Plan: Observe    (Z96.1) Pseudophakia, both eyes  Comment: Clear visual axis  Plan: Observe    (H04.123) Dry eyes, bilateral  Comment: Mild  Plan: Continue ATs 2-3 x daily     -----------------------------------------------------------------------------------    Patient disposition:   Return in about 1 year (around 7/24/2020) for applanation IOP, OVF 24-2, dilation, nerve OCT OU. or sooner as needed.    Teaching statement:  Complete documentation of historical and exam elements from today's encounter can be found in the full encounter summary report (not reduplicated in this progress note). I personally obtained the chief complaint(s) and history of present illness.  I confirmed and  edited as necessary the review of systems, past medical/surgical history, family history, social history, and examination findings as documented by others; and I examined the patient myself. I personally reviewed the relevant tests, images, and reports as documented above.     I formulated and edited as necessary the assessment and plan and discussed the findings and management plan with the patient and family.      Dora Tovar MD  Comprehensive Ophthalmology & Ocular Pathology  Department of Ophthalmology and Visual Neurosciences  [email or web address]  Pager [phone]

## 2019-07-30 ENCOUNTER — OFFICE VISIT (OUTPATIENT)
Dept: ALLERGY | Facility: CLINIC | Age: 69
End: 2019-07-30
Payer: MEDICARE

## 2019-07-30 DIAGNOSIS — Z88.9 MULTIPLE DRUG ALLERGIES: Primary | ICD-10-CM

## 2019-07-30 DIAGNOSIS — Z88.9 DRUG ALLERGY: Primary | ICD-10-CM

## 2019-07-30 DIAGNOSIS — L71.9 ROSACEA: ICD-10-CM

## 2019-07-30 RX ORDER — CEFTRIAXONE SODIUM 1 G
250 VIAL (EA) INJECTION ONCE
Status: DISCONTINUED | OUTPATIENT
Start: 2019-07-30 | End: 2019-08-31

## 2019-07-30 RX ORDER — CEFTAZIDIME 1 G/1
INJECTION, POWDER, FOR SOLUTION INTRAMUSCULAR; INTRAVENOUS
Qty: 1 EACH | Refills: 0 | Status: SHIPPED | OUTPATIENT
Start: 2019-07-30 | End: 2019-08-27

## 2019-07-30 RX ORDER — AZITHROMYCIN 500 MG/1
500 INJECTION, POWDER, LYOPHILIZED, FOR SOLUTION INTRAVENOUS
Status: CANCELLED | OUTPATIENT
Start: 2019-07-30

## 2019-07-30 RX ORDER — CEFAZOLIN SODIUM 1 G
500 VIAL (EA) INJECTION ONCE
Status: CANCELLED | OUTPATIENT
Start: 2019-07-30 | End: 2019-07-30

## 2019-07-30 RX ORDER — DOXYCYCLINE 100 MG/10ML
INJECTION, POWDER, LYOPHILIZED, FOR SOLUTION INTRAVENOUS
Qty: 10 ML | Refills: 0 | Status: SHIPPED | OUTPATIENT
Start: 2019-07-30 | End: 2019-08-27

## 2019-07-30 RX ORDER — AMPICILLIN 250 MG/1
INJECTION, POWDER, FOR SOLUTION INTRAMUSCULAR; INTRAVENOUS
Qty: 1 EACH | Refills: 0 | Status: SHIPPED | OUTPATIENT
Start: 2019-07-30 | End: 2019-07-31

## 2019-07-30 RX ORDER — CEFAZOLIN SODIUM 1 G
500 VIAL (EA) INJECTION ONCE
Status: DISCONTINUED | OUTPATIENT
Start: 2019-07-30 | End: 2019-08-31

## 2019-07-30 RX ORDER — AMPICILLIN 250 MG/1
INJECTION, POWDER, FOR SOLUTION INTRAMUSCULAR; INTRAVENOUS
Qty: 1 EACH | Refills: 0 | Status: CANCELLED | OUTPATIENT
Start: 2019-07-30

## 2019-07-30 RX ORDER — CEFTAZIDIME 1 G/1
INJECTION, POWDER, FOR SOLUTION INTRAMUSCULAR; INTRAVENOUS
Qty: 1 EACH | Refills: 0 | Status: CANCELLED | OUTPATIENT
Start: 2019-07-30

## 2019-07-30 RX ORDER — CEFTRIAXONE SODIUM 1 G
250 VIAL (EA) INJECTION ONCE
Status: CANCELLED | OUTPATIENT
Start: 2019-07-30 | End: 2019-07-30

## 2019-07-30 RX ORDER — AZITHROMYCIN 500 MG/1
INJECTION, POWDER, LYOPHILIZED, FOR SOLUTION INTRAVENOUS
Qty: 1 EACH | Refills: 0 | Status: SHIPPED | OUTPATIENT
Start: 2019-07-30 | End: 2019-08-27

## 2019-07-30 RX ORDER — DOXYCYCLINE 100 MG/10ML
INJECTION, POWDER, LYOPHILIZED, FOR SOLUTION INTRAVENOUS
Qty: 10 ML | Refills: 0 | Status: CANCELLED | OUTPATIENT
Start: 2019-07-30

## 2019-07-30 RX ORDER — FLUCONAZOLE 2 MG/ML
INJECTION, SOLUTION INTRAVENOUS
Qty: 100 ML | Refills: 0 | Status: SHIPPED | OUTPATIENT
Start: 2019-07-30 | End: 2019-08-27

## 2019-07-30 RX ORDER — FLUCONAZOLE 2 MG/ML
INJECTION, SOLUTION INTRAVENOUS
Qty: 100 ML | Refills: 0 | Status: CANCELLED | OUTPATIENT
Start: 2019-07-30

## 2019-07-30 RX ORDER — PENICILLIN G POTASSIUM 5000000 [IU]/1
5000000 INJECTION, POWDER, FOR SOLUTION INTRAMUSCULAR; INTRAVENOUS ONCE
Status: CANCELLED | OUTPATIENT
Start: 2019-07-30 | End: 2019-07-30

## 2019-07-30 RX ORDER — PENICILLIN G POTASSIUM 5000000 [IU]/1
5000000 INJECTION, POWDER, FOR SOLUTION INTRAMUSCULAR; INTRAVENOUS ONCE
Status: DISCONTINUED | OUTPATIENT
Start: 2019-07-30 | End: 2019-08-31

## 2019-07-30 RX ORDER — AMPICILLIN SODIUM 1 G/1
1 INJECTION, POWDER, FOR SOLUTION INTRAMUSCULAR; INTRAVENOUS ONCE
Status: CANCELLED | OUTPATIENT
Start: 2019-07-30 | End: 2019-07-30

## 2019-07-30 RX ORDER — AZITHROMYCIN 500 MG/1
INJECTION, POWDER, LYOPHILIZED, FOR SOLUTION INTRAVENOUS
Qty: 1 EACH | Refills: 0 | Status: CANCELLED | OUTPATIENT
Start: 2019-07-30

## 2019-07-30 ASSESSMENT — PAIN SCALES - GENERAL: PAINLEVEL: NO PAIN (0)

## 2019-07-30 NOTE — NURSING NOTE
Patient had 10 prick tests placed this visit.    Control Tests (2 tests)    Medications ( 8 tests)    Patient had 16 patch tests placed this visit.    Medications (16 tests)    Patient had 8 intradermal tests placed this visit.    Medications ( 8 tests)       1)Drug Administration Record  Prior to injection, verified patient identity using patient's name and date of birth.  Due to injection administration, patient instructed to remain in clinic for 15 minutes  afterwards, and to report any adverse reaction to me immediately.  Drug Name: Doxycycline  Dose: 100 mg/ vial  Route administered: Patch, prick, ID  NDC #: 58843-188-91  Amount of waste(mL):3.5  Reason for waste: Single use vial  LOT #: [id number]  SITE: Arm  : [employer]  EXPIRATION DATE: 12/2020    2) Drug Administration Record  Prior to injection, verified patient identity using patient's name and date of birth.  Due to injection administration, patient instructed to remain in clinic for 15 minutes  afterwards, and to report any adverse reaction to me immediately.  Drug Name: Azithromycin  Dose: 1.5ml  Route administered: Patch, prick, ID  NDC #: 63323-398-10   Amount of waste(mL):3.5  Reason for waste: Single use vial  LOT #: [id number]  SITE: [facility]  : [employer]  EXPIRATION DATE: 10/2020    3) Drug Administration Record  Prior to injection, verified patient identity using patient's name and date of birth.  Due to injection administration, patient instructed to remain in clinic for 15 minutes  afterwards, and to report any adverse reaction to me immediately.  Drug Name: Fluconazole   Dose: 1.5ml  Route administered: Patch, prick, ID  NDC #: 0981-6076-06  Amount of waste(mL):98.5  Reason for waste: Single use vial  LOT #: [id number]  SITE: arms  : [employer]  EXPIRATION DATE: 04/2020    4) Drug Administration Record  Prior to injection, verified patient identity using patient's name and date of birth.  Due to injection  administration, patient instructed to remain in clinic for 15 minutes  afterwards, and to report any adverse reaction to me immediately.  Drug Name: Penicillin G  Dose: 1.5ml  Route administered: Patch, prick, ID  NDC #: 5610-9785-81  Amount of waste(mL):3.5ml  Reason for waste: Single use vial  LOT #: [id number]  SITE: [facility]  : [employer] [facility]  EXPIRATION DATE: 09.2020    5) Drug Administration Record  Prior to injection, verified patient identity using patient's name and date of birth.  Due to injection administration, patient instructed to remain in clinic for 15 minutes  afterwards, and to report any adverse reaction to me immediately.  Drug Name: Ampicollin  Dose: 1.5ml  Route administered: Patch, prick, ID  NDC #: [id number]  Amount of waste(mL):1ml  Reason for waste: Single use vial  LOT #: [id number]  SITE: [facility]  : [employer]  EXPIRATION DATE: 11/2020    6) Drug Administration Record  Prior to injection, verified patient identity using patient's name and date of birth.  Due to injection administration, patient instructed to remain in clinic for 15 minutes  afterwards, and to report any adverse reaction to me immediately.  Drug Name: Cefazolin  Dose: 1.5ml  Route administered: Patch, prick, ID  NDC #: 31053-022-85  Amount of waste(mL):3.5ml  Reason for waste: Single use vial  LOT #: [id number]  SITE: [facility]  : [employer] [facility]  EXPIRATION DATE: 01/2021    7) Drug Administration Record  Prior to injection, verified patient identity using patient's name and date of birth.  Due to injection administration, patient instructed to remain in clinic for 15 minutes  afterwards, and to report any adverse reaction to me immediately.  Drug Name: Ceftriaxone  Dose: 1.5ml  Route administered: Patch, prick, ID  NDC #: [id number]  Amount of waste(mL):1ml  Reason for waste: Single use vial  LOT #: [id number]  SITE: Arms  : [employer]  EXPIRATION DATE: 06/2021    8) Drug Administration Record  Prior to  injection, verified patient identity using patient's name and date of birth.  Due to injection administration, patient instructed to remain in clinic for 15 minutes  afterwards, and to report any adverse reaction to me immediately.  Drug Name: Caftazidime  Dose: 1.5ml  Route administered: Patch, prick, ID  NDC #: 10105-110-71  Amount of waste(mL):1.5ml  Reason for waste: Single use vial  LOT #: [id number]  SITE: [facility]  : [employer] [facility]  EXPIRATION DATE: 01/2020

## 2019-07-30 NOTE — LETTER
"    7/30/2019         RE: Luz Thompson  [address]  [place] [address]        Dear Colleague,    Thank you for referring your patient, Luz Thompson, to the [facility] ALLER[facility]. Please see a copy of my visit note below.    [facility] Dermatology Note    Dermatology Problem List:  1. s/p liver transplant for PBC (2002)   - current: sirolimus, prednisone   2. Pruritic papular skin eruption-self-induced, resolved  3. Mild atopic dermatitis- hands, thumbs, abdomen, back   - previous: Amlactin, patient discontinued   4. Actinic keratoses- right temple, left cheek, s/p LN2  5. Onychomycosis-  Oral voriconazole, prescribed by Dr. Montero (IM)   6. Drug hypersensitivity reaction- fluconazole  - residual lesions of abdomen and chest   7. Atrophe shanna    Encounter Date: Jul 30, 2019    CC:  Chief Complaint   Patient presents with     Allergies     Michael is here for drug testing.      History of Present Illness:  Ms. Luz Thompson is a 69 year old female who presents today for drug testing. She was last seen 2 weeks ago for a skin check and rosacea with periocular involvement. At that time, she also reported multiple drug allergies mainly to antibiotics, including a severe reaction to fluconazole (she describes this as a \"full body hive\" but couldn't tell if the lesions were coming and going within 24 hours since the rash was so extensive), and rashes/itching with azithromycin, doxycycline, penicillin, and cephalexin.   For her rosacea, she was started on metronidazole gel which she reports is working fairly well.     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     Prophylactic antibiotic     High risk medication use     Statin intolerance     EBV " "(Waqas-Barr virus) viremia     Sensorineural hearing loss (SNHL) of both ears     Abnormal liver function tests     Liver replaced by transplant (H)     Type 2 diabetes, HbA1c goal < 7% (H)     Hyperlipidemia LDL goal <70     Hypertension goal BP (blood pressure) < 140/80     Postoperative hypothyroidism     Type 2 diabetes mellitus with stage 3 chronic kidney disease, without long-term current use of insulin (H)     Age-related osteoporosis without current pathological fracture     Tympanic membrane perforation, left     Other infective chronic otitis externa of left ear     CKD (chronic kidney disease) stage 3, GFR 30-59 ml/min (H)     Past Medical History:   Diagnosis Date     Anemia of other chronic disease 10/17/2011     Anxiety      Bladder infection, chronic 4/4/2012     CKD (chronic kidney disease) stage 3, GFR 30-59 ml/min (H) 4/4/2012     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      Diverticulosis of sigmoid colon 12/21/2013     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     Received Rituxan during Summer of 2016     H/O esophageal varices      Hearing loss      Heart murmur 4/4/2012     History of DVT (deep vein thrombosis)      History of [place] fever      History of thyroid cancer 9/25/2012     Hyperlipidemia 4/10/2012    Says that she does not have it anymore, not on meds     Hyperlipidemia LDL goal <70      Hypertension goal BP (blood pressure) < 140/80 11/06/2013     Hypertriglyceridemia      Liver replaced by transplant (H) 10/17/2011    Dr. Gentry Ramirez, 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     Past Surgical History:   Procedure Laterality Date     APPENDECTOMY  1961     BIOPSY       CATARACT IOL, RT/LT      RE12/19/2013, LE12/10/2013 - Toric lenses     CHOLECYSTECTOMY  1991     COLONOSCOPY  3/10/2014    Procedure: COLONOSCOPY;;  Surgeon: Gentry Ramirez MD;  Location: [facility]     ear drum repair       ENDOBRONCHIAL ULTRASOUND FLEXIBLE N/A 9/29/2017    Procedure: ENDOBRONCHIAL ULTRASOUND FLEXIBLE;  Flexible Bronchoscopy, Endobronchial Ultrasound, Transbronchial Needle Aspiration ;  Surgeon: Eden Clinton MD;  Location: [facility] OR     ENDOSCOPIC RETROGRADE CHOLANGIOPANCREATOGRAM  9/19/2013    Procedure: ENDOSCOPIC RETROGRADE CHOLANGIOPANCREATOGRAM;  Endoscopic Retrograde Cholangiopancreatogram with single balloon enteroscopy, ballon sweep of bile duct;  Surgeon: Brett Membreno MD;  Location:  OR      KNEE SCOPE,MED/LAT MENISECTOMY  8/10/12    Right, partial medial menisectomy only     KNEE SURGERY  1966    R knee     PICC INSERTION  9/18/2013    4fr SL PASV PICC, 40cm (1cm external) in the R basilic vein w/ tip in the low SVC     PICC INSERTION  2/21/2014    5 fr DL BioFlo Navilyst PICC, 46 cm (3 cm external) in the L basilic vein w/ tip in the SVC RA junction.     THYROIDECTOMY  3/2010     TRANSPLANT LIVER RECIPIENT LIVING UNRELATED         Social History:  Patient reports that she quit smoking about 34 years ago. Her smoking use included cigarettes. She has a 18.00 pack-year smoking history. She has never used smokeless tobacco. She reports that she drinks alcohol. She reports that she does not use drugs.    Family History:  Family History   Problem Relation Age of Onset     Hypertension Mother      Endometrial Cancer Mother      Hyperlipidemia Mother      Prostate Cancer Father      Macular  Degeneration Father      Cancer - colorectal Maternal Grandmother         in her 80's, has surgery and removal of part of kidney,  at age 98     Heart Disease Maternal Grandfather          at 98     Glaucoma Maternal Grandfather      Cerebrovascular Disease Paternal Grandmother         in her 80's     Hypertension Paternal Grandmother      Heart Disease Paternal Grandfather         MI     Alzheimer Disease Paternal Grandfather      Allergies Son      Neurologic Disorder Daughter         Migraines     Breast Cancer Other      Anesthesia Reaction No family hx of      Crohn's Disease No family hx of      Ulcerative Colitis No family hx of        Medications:  Current Outpatient Medications   Medication Sig Dispense Refill     acetaminophen 500 MG CAPS Take 1,000 mg by mouth nightly as needed Take 500-1,000 mg by mouth every 6 hours if needed.        alirocumab (PRALUENT) 150 MG/ML injectable pen Inject 1 mL (150 mg) Subcutaneous every 14 days 6 mL 3     ampicillin (OMNIPEN) 250 MG injection For allergy testing 1 each 0     azithromycin (ZITHROMAX) 500 MG vial For allergy testing 1 each 0     benzoyl peroxide 5 % external liquid Use daily as directed 148 g 1     blood glucose monitoring (NO BRAND SPECIFIED) meter device kit Use to test blood sugar 2times daily or as directed. 1 kit 0     blood glucose monitoring (NO BRAND SPECIFIED) test strip Use to test blood sugar 2 times daily, before breakfast and before bedtime 200 each 3     calcitRIOL (ROCALTROL) 0.5 MCG capsule Take 1 capsule (0.5 mcg) by mouth daily 90 capsule 3     cefTAZidime (FORTAZ) 1 GM vial For Allergy Testing in Allergy Clinic Only 1 each 0     ciprofloxacin-dexamethasone (CIPRODEX) otic suspension Place 4 drops Into the left ear three times a week 5.6 mL 6     clotrimazole (LOTRIMIN) 1 % cream Apply topically 2 times daily as needed Reported on 2017       doxycycline (VIBRAMYCIN) 100 mg vial to attach to  mL bag For allergy testing  10 mL 0     ELIQUIS 2.5 MG tablet Take 1 tablet (2.5 mg) by mouth 2 times daily 180 tablet 3     estradiol (ESTRACE VAGINAL) 0.1 MG/GM cream Place 2 g vaginally twice a week 42.5 g 11     estradiol (VAGIFEM) 10 MCG TABS vaginal tablet Place 1 tablet (10 mcg) vaginally twice a week 8 tablet 11     ezetimibe (ZETIA) 10 MG tablet TAKE 1 TABLET BY MOUTH EVERY DAY OR AS DIRECTED BY DR WOLF 90 tablet 3     ferrous gluconate (FERGON) 324 (38 Fe) MG tablet Take 1 tablet (324 mg) by mouth 3 times daily (with meals) 90 tablet 0     fluconazole (DIFLUCAN) 200-0.9 MG/100ML-% intermittent infusion For allergy testing 100 mL 0     folic acid (FOLVITE) 1 MG tablet Take 1 tablet (1 mg) by mouth daily 100 tablet 3     furosemide (LASIX) 20 MG tablet TAKE 1/2 TABLET BY MOUTH EVERY DAY 45 tablet 3     hydrALAZINE (APRESOLINE) 25 MG tablet Take 0.5 tablets (12.5 mg) by mouth 3 times daily as needed , if systolic blood pressure is more than 140 mmHg. 270 tablet 3     Hypromellose (ARTIFICIAL TEARS OP) Apply 1 drop to eye as needed       levothyroxine (SYNTHROID/LEVOTHROID) 150 MCG tablet TAKE 1.5 TABLETS BY MOUTH ON MONDAYS. AND 1 TABLET DAILY, THE OTHER DAYS OF THE WEEK. 102 tablet 3     meclizine (ANTIVERT) 25 MG tablet Take 1 tablet (25 mg) by mouth as needed 30 tablet 11     methenamine (HIPREX) 1 g tablet Take 1 tablet (1 g) by mouth At Bedtime 30 tablet 11     metoprolol succinate (TOPROL-XL) 50 MG 24 hr tablet Take 1 tablet (50 mg) by mouth daily 90 tablet 3     metroNIDAZOLE (METROCREAM) 0.75 % cream Apply topically 2 times daily 45 g 5     metroNIDAZOLE (METROGEL) 0.75 % external gel Apply topically 2 times daily Put on face 45 g 3     Multiple Vitamins-Minerals (PRESERVISION AREDS 2) CAPS Take 1 capsule by mouth 2 times daily       nitroFURantoin, macrocrystal-monohydrate, (MACROBID) 100 MG capsule Take 1 capsule (100 mg) by mouth 2 times daily For one week at onset of UTI symptoms 14 capsule 6     omega-3 acid ethyl esters  (LOVAZA) 1 g capsule Take 1 capsule (1 g) by mouth 2 times daily 360 capsule 3     order for DME Equipment being ordered: right brace with thumb 1 Device 0     posaconazole (NOXAFIL) 100 MG EC tablet Take 3 tablets (300 mg) by mouth every morning 90 tablet 11     predniSONE (DELTASONE) 5 MG tablet Take 1 tablet (5 mg) by mouth daily 90 tablet 3     RAPAMUNE (BRAND) 1 MG tablet Take 1 tablet (1 mg) by mouth every other day 45 tablet 3     sertraline (ZOLOFT) 100 MG tablet Take 1 tab (100mg) PO daily (Patient taking differently: Take 50 mg by mouth daily ) 90 tablet 0     SUMAtriptan (IMITREX) 50 MG tablet Take 1 tablet (50 mg) by mouth at onset of headache for migraine repeat after 2 hours if needed. 30 tablet 3     triamcinolone (KENALOG) 0.025 % cream Apply topically 2 times daily To eyelids 15 g 1     triamcinolone (KENALOG) 0.1 % cream Apply topically 2 times daily as needed for irritation       ursodiol (ACTIGALL) 250 MG tablet TAKE 1 TABLET BY MOUTH TWICE A  tablet 3     voriconazole (VFEND) 200 MG tablet Take 1 tablet (200 mg) by mouth 2 times daily 180 tablet 0     Wheat Dextrin (BENEFIBER) POWD 2 teaspoons daily         Allergies   Allergen Reactions     Fluconazole Hives and Itching     Ciprofloxacin Anxiety and Other (See Comments)     Irregular heart beat     Azithromycin Itching     Benadryl [Diphenhydramine Hcl]      Insomnia      Cellcept Diarrhea     Ciprofloxacin Other (See Comments)     Insomnia, mood lability     Codeine      Psych disturbance     Codeine      Diphenhydramine Other (See Comments)     Doxycycline      Lansoprazole Diarrhea     Levaquin [Levofloxacin] Other (See Comments)     Headache, hyperactivity     Lisinopril Cough     Methotrexate      Sores     Methotrexate      Morphine Sulfate Itching     Mycophenolate Diarrhea     No Clinical Screening - See Comments      Simvastatin Muscle Pain (Myalgia)     severe     Cephalexin Rash     Fever and skin burning     Penicillin G  Itching and Rash     Tolectin [Nsaids] Rash     Tramadol Rash       Review of Systems:  -As per HPI  -Constitutional: Otherwise feeling well today, in usual state of health.  -Skin: As above in HPI. No additional skin concerns.    Physical exam:  Vitals: LMP 06/01/1988 (Approximate)   GEN: This is a well developed, well-nourished female in no acute distress, in a pleasant mood.    SKIN: Focused examination of the face and eyes was performed.  -Face with scattered non-descript pink papules that are mostly excoriated.   -No other lesions of concern on areas examined.     Allergy tests:  Drug allergy tests 07/30/2019  Prick tests negatives with Histamine ++    Order documented by: Maureen Oliveira LPN  Order reviewed by:  Physician:    Date/time of application: 7/30/19  Localization of application: forearm    DRUG ALLERGY TEST SERIES     ANTIBIOTICS      Prick Tests        Substance/ Allergen Conc Result (20 min) Remarks   Histamine Hydrochloride (ALK) 0.1 mg/ml ++    NaCl 0.9% -    Doxycycline 100 mg/ 5ml - 1   Azithromycin 100 mg/ml - 2   Fluconazole 2mg/ml - 3   Penicillin G 1 Million international unit(s)/ml - 4   Ampicillin 250 mg/ml - 5   Cefazolin 100 mg/ ml - 6   Ceftriaxone 100 mg/ ml - 7   Ceftazidime 100 mg/ ml - 8     Intradermal Tests   immed immed delay delay     Substance Conc 1st dil  2nd dil  days  days remarks   Doxycyclin 1:100 - -   1   Azithromycin 1:100 - -   2   Fluconazole 1:100 - -   3   Penicillin G 1:100 - -   4   Ampicillin 1:100 - -   5   Cefazolin 1:5 - -   6   Ceftriaxone 1:5 - -   7   Ceftazidime 1:5 - -   8   * Only with limited indications      Patch Tests  as is as is 1:2 1:2     As Is  Vas Substance Conc days days days days remarks   1  Doxycycline 100 mg/ 5ml        2  Azithromycin 100mg/ml        3  Fluconazole 2mg/ml        4  Penicillin G 1 Million international unit(s)/ ml         5  Ampicillin 250mg/ml        6  Cefazolin 100 mg/ ml        7  Ceftriaxone 100 mg/ ml         8  Ceftazidime 100 mg/ ml        *  Methoxyimino-group (crossreactive IgE)  [1]  Cefalexin/Cefazolin-group        [2]    Cefotaxim-group     [3]     Cefuroxim-group      Impression/Plan:  1. Reported history of multiple drug allergies particularly to antibiotics and fluconazole.   - Prick and intradermal testing done today to fluconazole, doxycycline, azithromycin, penicillins, and cephalosporins with immediate results being negative. Patches placed today as well.   - Follow up on Friday for delayed readings.     2. Acne rosacea - improving on metronidazole gel.  - Consider doxycycline therapy if drug testing is negative.     Return in 3 days for delayed readings.     Patient was staffed with Dr. Rai.     June Vivas MD  Dermatology Resident PGY4    Staff Physician Comments:  I saw and evaluated the patient with the resident and I agree with the assessment and plan as documented in the resident's note. I performed the skin tests myself     Will Rai MD  Professor  Head of Dermato-Allergy Division  Department of Dermatology  [facility]    I spent a total of 25 minutes face to face with Luz Thompson during today s office visit. Over 50% of this time was spent counseling the patient and/or coordinating care. Please see Assessment and Plan for details.      Again, thank you for allowing me to participate in the care of your patient.        Sincerely,        Will Rai MD     No

## 2019-07-30 NOTE — PROGRESS NOTES
"[facility] Dermatology Note    Dermatology Problem List:  1. s/p liver transplant for PBC (2002)   - current: sirolimus, prednisone   2. Pruritic papular skin eruption-self-induced, resolved  3. Mild atopic dermatitis- hands, thumbs, abdomen, back   - previous: Amlactin, patient discontinued   4. Actinic keratoses- right temple, left cheek, s/p LN2  5. Onychomycosis-  Oral voriconazole, prescribed by Dr. Montero (IM)   6. Drug hypersensitivity reaction- fluconazole  - residual lesions of abdomen and chest   7. Atrophe shanna    Encounter Date: Jul 30, 2019    CC:  Chief Complaint   Patient presents with     Allergies     Michael is here for drug testing.      History of Present Illness:  Ms. Luz Thompson is a 69 year old female who presents today for drug testing. She was last seen 2 weeks ago for a skin check and rosacea with periocular involvement. At that time, she also reported multiple drug allergies mainly to antibiotics, including a severe reaction to fluconazole (she describes this as a \"full body hive\" but couldn't tell if the lesions were coming and going within 24 hours since the rash was so extensive), and rashes/itching with azithromycin, doxycycline, penicillin, and cephalexin.   For her rosacea, she was started on metronidazole gel which she reports is working fairly well.     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     Prophylactic antibiotic     High risk medication use     Statin intolerance     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     Sensorineural hearing loss (SNHL) of both ears     Abnormal liver function tests     Liver replaced by transplant (H)     Type 2 diabetes, HbA1c goal < 7% (H)     Hyperlipidemia LDL goal <70     Hypertension goal BP (blood " "pressure) < 140/80     Postoperative hypothyroidism     Type 2 diabetes mellitus with stage 3 chronic kidney disease, without long-term current use of insulin (H)     Age-related osteoporosis without current pathological fracture     Tympanic membrane perforation, left     Other infective chronic otitis externa of left ear     CKD (chronic kidney disease) stage 3, GFR 30-59 ml/min (H)     Past Medical History:   Diagnosis Date     Anemia of other chronic disease 10/17/2011     Anxiety      Bladder infection, chronic 4/4/2012     CKD (chronic kidney disease) stage 3, GFR 30-59 ml/min (H) 4/4/2012     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      Diverticulosis of sigmoid colon 12/21/2013     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     Received Rituxan during Summer of 2016     H/O esophageal varices      Hearing loss      Heart murmur 4/4/2012     History of DVT (deep vein thrombosis)      History of [facility] fever      History of thyroid cancer 9/25/2012     Hyperlipidemia 4/10/2012    Says that she does not have it anymore, not on meds     Hyperlipidemia LDL goal <70      Hypertension goal BP (blood pressure) < 140/80 11/06/2013     Hypertriglyceridemia      Liver replaced by transplant (H) 10/17/2011    Dr. 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     Past Surgical History:   Procedure Laterality Date     " APPENDECTOMY       BIOPSY       CATARACT IOL, RT/LT      RE2013, LE12/10/2013 - Toric lenses     CHOLECYSTECTOMY       COLONOSCOPY  3/10/2014    Procedure: COLONOSCOPY;;  Surgeon: Gentry Ramirez MD;  Location: [facility]     ear drum repair       ENDOBRONCHIAL ULTRASOUND FLEXIBLE N/A 2017    Procedure: ENDOBRONCHIAL ULTRASOUND FLEXIBLE;  Flexible Bronchoscopy, Endobronchial Ultrasound, Transbronchial Needle Aspiration ;  Surgeon: Eden Clinton MD;  [facility]     ENDOSCOPIC RETROGRADE CHOLANGIOPANCREATOGRAM  2013    Procedure: ENDOSCOPIC RETROGRADE CHOLANGIOPANCREATOGRAM;  Endoscopic Retrograde Cholangiopancreatogram with single balloon enteroscopy, ballon sweep of bile duct;  Surgeon: Brett Membreno MD;  Location: [facility] OR      KNEE SCOPE,MED/LAT MENISECTOMY  8/10/12    Right, partial medial menisectomy only     KNEE SURGERY  1966    R knee     PICC INSERTION  2013    4fr SL PASV PICC, 40cm (1cm external) in the R basilic vein w/ tip in the low SVC     PICC INSERTION  2014    5 fr DL BioFlo Navilyst PICC, 46 cm (3 cm external) in the L basilic vein w/ tip in the SVC RA junction.     THYROIDECTOMY  3/2010     TRANSPLANT LIVER RECIPIENT LIVING UNRELATED         Social History:  Patient reports that she quit smoking about 34 years ago. Her smoking use included cigarettes. She has a 18.00 pack-year smoking history. She has never used smokeless tobacco. She reports that she drinks alcohol. She reports that she does not use drugs.    Family History:  Family History   Problem Relation Age of Onset     Hypertension Mother      Endometrial Cancer Mother      Hyperlipidemia Mother      Prostate Cancer Father      Macular Degeneration Father      Cancer - colorectal Maternal Grandmother         in her 80's, has surgery and removal of part of kidney,  at age 98     Heart Disease Maternal Grandfather          at 98     Glaucoma Maternal Grandfather      Cerebrovascular  Disease Paternal Grandmother         in her 80's     Hypertension Paternal Grandmother      Heart Disease Paternal Grandfather         MI     Alzheimer Disease Paternal Grandfather      Allergies Son      Neurologic Disorder Daughter         Migraines     Breast Cancer Other      Anesthesia Reaction No family hx of      Crohn's Disease No family hx of      Ulcerative Colitis No family hx of        Medications:  Current Outpatient Medications   Medication Sig Dispense Refill     acetaminophen 500 MG CAPS Take 1,000 mg by mouth nightly as needed Take 500-1,000 mg by mouth every 6 hours if needed.        alirocumab (PRALUENT) 150 MG/ML injectable pen Inject 1 mL (150 mg) Subcutaneous every 14 days 6 mL 3     ampicillin (OMNIPEN) 250 MG injection For allergy testing 1 each 0     azithromycin (ZITHROMAX) 500 MG vial For allergy testing 1 each 0     benzoyl peroxide 5 % external liquid Use daily as directed 148 g 1     blood glucose monitoring (NO BRAND SPECIFIED) meter device kit Use to test blood sugar 2times daily or as directed. 1 kit 0     blood glucose monitoring (NO BRAND SPECIFIED) test strip Use to test blood sugar 2 times daily, before breakfast and before bedtime 200 each 3     calcitRIOL (ROCALTROL) 0.5 MCG capsule Take 1 capsule (0.5 mcg) by mouth daily 90 capsule 3     cefTAZidime (FORTAZ) 1 GM vial For Allergy Testing in Allergy Clinic Only 1 each 0     ciprofloxacin-dexamethasone (CIPRODEX) otic suspension Place 4 drops Into the left ear three times a week 5.6 mL 6     clotrimazole (LOTRIMIN) 1 % cream Apply topically 2 times daily as needed Reported on 4/25/2017       doxycycline (VIBRAMYCIN) 100 mg vial to attach to  mL bag For allergy testing 10 mL 0     ELIQUIS 2.5 MG tablet Take 1 tablet (2.5 mg) by mouth 2 times daily 180 tablet 3     estradiol (ESTRACE VAGINAL) 0.1 MG/GM cream Place 2 g vaginally twice a week 42.5 g 11     estradiol (VAGIFEM) 10 MCG TABS vaginal tablet Place 1 tablet (10 mcg)  vaginally twice a week 8 tablet 11     ezetimibe (ZETIA) 10 MG tablet TAKE 1 TABLET BY MOUTH EVERY DAY OR AS DIRECTED BY DR WOLF 90 tablet 3     ferrous gluconate (FERGON) 324 (38 Fe) MG tablet Take 1 tablet (324 mg) by mouth 3 times daily (with meals) 90 tablet 0     fluconazole (DIFLUCAN) 200-0.9 MG/100ML-% intermittent infusion For allergy testing 100 mL 0     folic acid (FOLVITE) 1 MG tablet Take 1 tablet (1 mg) by mouth daily 100 tablet 3     furosemide (LASIX) 20 MG tablet TAKE 1/2 TABLET BY MOUTH EVERY DAY 45 tablet 3     hydrALAZINE (APRESOLINE) 25 MG tablet Take 0.5 tablets (12.5 mg) by mouth 3 times daily as needed , if systolic blood pressure is more than 140 mmHg. 270 tablet 3     Hypromellose (ARTIFICIAL TEARS OP) Apply 1 drop to eye as needed       levothyroxine (SYNTHROID/LEVOTHROID) 150 MCG tablet TAKE 1.5 TABLETS BY MOUTH ON MONDAYS. AND 1 TABLET DAILY, THE OTHER DAYS OF THE WEEK. 102 tablet 3     meclizine (ANTIVERT) 25 MG tablet Take 1 tablet (25 mg) by mouth as needed 30 tablet 11     methenamine (HIPREX) 1 g tablet Take 1 tablet (1 g) by mouth At Bedtime 30 tablet 11     metoprolol succinate (TOPROL-XL) 50 MG 24 hr tablet Take 1 tablet (50 mg) by mouth daily 90 tablet 3     metroNIDAZOLE (METROCREAM) 0.75 % cream Apply topically 2 times daily 45 g 5     metroNIDAZOLE (METROGEL) 0.75 % external gel Apply topically 2 times daily Put on face 45 g 3     Multiple Vitamins-Minerals (PRESERVISION AREDS 2) CAPS Take 1 capsule by mouth 2 times daily       nitroFURantoin, macrocrystal-monohydrate, (MACROBID) 100 MG capsule Take 1 capsule (100 mg) by mouth 2 times daily For one week at onset of UTI symptoms 14 capsule 6     omega-3 acid ethyl esters (LOVAZA) 1 g capsule Take 1 capsule (1 g) by mouth 2 times daily 360 capsule 3     order for DME Equipment being ordered: right brace with thumb 1 Device 0     posaconazole (NOXAFIL) 100 MG EC tablet Take 3 tablets (300 mg) by mouth every morning 90 tablet 11      predniSONE (DELTASONE) 5 MG tablet Take 1 tablet (5 mg) by mouth daily 90 tablet 3     RAPAMUNE (BRAND) 1 MG tablet Take 1 tablet (1 mg) by mouth every other day 45 tablet 3     sertraline (ZOLOFT) 100 MG tablet Take 1 tab (100mg) PO daily (Patient taking differently: Take 50 mg by mouth daily ) 90 tablet 0     SUMAtriptan (IMITREX) 50 MG tablet Take 1 tablet (50 mg) by mouth at onset of headache for migraine repeat after 2 hours if needed. 30 tablet 3     triamcinolone (KENALOG) 0.025 % cream Apply topically 2 times daily To eyelids 15 g 1     triamcinolone (KENALOG) 0.1 % cream Apply topically 2 times daily as needed for irritation       ursodiol (ACTIGALL) 250 MG tablet TAKE 1 TABLET BY MOUTH TWICE A  tablet 3     voriconazole (VFEND) 200 MG tablet Take 1 tablet (200 mg) by mouth 2 times daily 180 tablet 0     Wheat Dextrin (BENEFIBER) POWD 2 teaspoons daily         Allergies   Allergen Reactions     Fluconazole Hives and Itching     Ciprofloxacin Anxiety and Other (See Comments)     Irregular heart beat     Azithromycin Itching     Benadryl [Diphenhydramine Hcl]      Insomnia      Cellcept Diarrhea     Ciprofloxacin Other (See Comments)     Insomnia, mood lability     Codeine      Psych disturbance     Codeine      Diphenhydramine Other (See Comments)     Doxycycline      Lansoprazole Diarrhea     Levaquin [Levofloxacin] Other (See Comments)     Headache, hyperactivity     Lisinopril Cough     Methotrexate      Sores     Methotrexate      Morphine Sulfate Itching     Mycophenolate Diarrhea     No Clinical Screening - See Comments      Simvastatin Muscle Pain (Myalgia)     severe     Cephalexin Rash     Fever and skin burning     Penicillin G Itching and Rash     Tolectin [Nsaids] Rash     Tramadol Rash       Review of Systems:  -As per HPI  -Constitutional: Otherwise feeling well today, in usual state of health.  -Skin: As above in HPI. No additional skin concerns.    Physical exam:  Vitals: LMP  06/01/1988 (Approximate)   GEN: This is a well developed, well-nourished female in no acute distress, in a pleasant mood.    SKIN: Focused examination of the face and eyes was performed.  -Face with scattered non-descript pink papules that are mostly excoriated.   -No other lesions of concern on areas examined.     Allergy tests:  Drug allergy tests 07/30/2019  Prick tests negatives with Histamine ++    Order documented by: Maureen Oliveira LPN  Order reviewed by:  Physician:    Date/time of application: 7/30/19  Localization of application: forearm    DRUG ALLERGY TEST SERIES     ANTIBIOTICS      Prick Tests        Substance/ Allergen Conc Result (20 min) Remarks   Histamine Hydrochloride (ALK) 0.1 mg/ml ++    NaCl 0.9% -    Doxycycline 100 mg/ 5ml - 1   Azithromycin 100 mg/ml - 2   Fluconazole 2mg/ml - 3   Penicillin G 1 Million international unit(s)/ml - 4   Ampicillin 250 mg/ml - 5   Cefazolin 100 mg/ ml - 6   Ceftriaxone 100 mg/ ml - 7   Ceftazidime 100 mg/ ml - 8     Intradermal Tests   immed immed delay delay     Substance Conc 1st dil  2nd dil  days  days remarks   Doxycyclin 1:100 - -   1   Azithromycin 1:100 - -   2   Fluconazole 1:100 - -   3   Penicillin G 1:100 - -   4   Ampicillin 1:100 - -   5   Cefazolin 1:5 - -   6   Ceftriaxone 1:5 - -   7   Ceftazidime 1:5 - -   8   * Only with limited indications      Patch Tests  as is as is 1:2 1:2     As Is  Vas Substance Conc days days days days remarks   1  Doxycycline 100 mg/ 5ml        2  Azithromycin 100mg/ml        3  Fluconazole 2mg/ml        4  Penicillin G 1 Million international unit(s)/ ml         5  Ampicillin 250mg/ml        6  Cefazolin 100 mg/ ml        7  Ceftriaxone 100 mg/ ml        8  Ceftazidime 100 mg/ ml        *  Methoxyimino-group (crossreactive IgE)  [1]  Cefalexin/Cefazolin-group        [2]    Cefotaxim-group     [3]     Cefuroxim-group      Impression/Plan:  1. Reported history of multiple drug allergies particularly to  antibiotics and fluconazole.   - Prick and intradermal testing done today to fluconazole, doxycycline, azithromycin, penicillins, and cephalosporins with immediate results being negative. Patches placed today as well.   - Follow up on Friday for delayed readings.     2. Acne rosacea - improving on metronidazole gel.  - Consider doxycycline therapy if drug testing is negative.     Return in 3 days for delayed readings.     Patient was staffed with Dr. Rai.     June Vivas MD  Dermatology Resident PGY4    Staff Physician Comments:  I saw and evaluated the patient with the resident and I agree with the assessment and plan as documented in the resident's note. I performed the skin tests myself     Will Rai MD  Professor  Head of Dermato-Allergy Division  Department of Dermatology  [facility]    I spent a total of 25 minutes face to face with Luz Thompson during today s office visit. Over 50% of this time was spent counseling the patient and/or coordinating care. Please see Assessment and Plan for details.

## 2019-07-30 NOTE — NURSING NOTE
Allergy Rooming Note    Luz Thompson's goals for this visit include:   Chief Complaint   Patient presents with     Allergies     Michael is here for drug testing.      Maureen Oliveira LPN

## 2019-07-31 ENCOUNTER — OFFICE VISIT (OUTPATIENT)
Dept: ORTHOPEDICS | Facility: CLINIC | Age: 69
End: 2019-07-31
Payer: MEDICARE

## 2019-07-31 ENCOUNTER — ANCILLARY PROCEDURE (OUTPATIENT)
Dept: GENERAL RADIOLOGY | Facility: CLINIC | Age: 69
End: 2019-07-31
Attending: PREVENTIVE MEDICINE
Payer: MEDICARE

## 2019-07-31 VITALS — BODY MASS INDEX: 28.72 KG/M2 | WEIGHT: 183 LBS | HEIGHT: 67 IN

## 2019-07-31 DIAGNOSIS — M17.0 ARTHRITIS OF BOTH KNEES: ICD-10-CM

## 2019-07-31 DIAGNOSIS — M18.11 PRIMARY OSTEOARTHRITIS OF FIRST CARPOMETACARPAL JOINT OF RIGHT HAND: ICD-10-CM

## 2019-07-31 DIAGNOSIS — M18.11 PRIMARY OSTEOARTHRITIS OF FIRST CARPOMETACARPAL JOINT OF RIGHT HAND: Primary | ICD-10-CM

## 2019-07-31 PROCEDURE — 73562 X-RAY EXAM OF KNEE 3: CPT | Mod: LT | Performed by: RADIOLOGY

## 2019-07-31 PROCEDURE — 99214 OFFICE O/P EST MOD 30 MIN: CPT | Performed by: PREVENTIVE MEDICINE

## 2019-07-31 PROCEDURE — 73130 X-RAY EXAM OF HAND: CPT | Mod: RT | Performed by: RADIOLOGY

## 2019-07-31 RX ORDER — PREDNISONE 20 MG/1
20 TABLET ORAL 2 TIMES DAILY
Qty: 10 TABLET | Refills: 0 | Status: SHIPPED | OUTPATIENT
Start: 2019-07-31 | End: 2019-08-27

## 2019-07-31 ASSESSMENT — MIFFLIN-ST. JEOR: SCORE: 1382.71

## 2019-07-31 ASSESSMENT — PAIN SCALES - GENERAL: PAINLEVEL: MILD PAIN (3)

## 2019-07-31 NOTE — PROGRESS NOTES
"NEW PATIENT INTAKE QUESTIONNAIRE  [facility] 7/31/2019      Luz Thompson's chief complaint for this visit includes:  Chief Complaint   Patient presents with     Wrist Pain     Right wrist tendinitis      PCP: Jennifer Barraza    Referring Provider:  Suni Cai PA-C  [address] [place] [address]     1.702 m (5' 7\")   Wt 83 kg (183 lb)   LMP 06/01/1988 (Approximate)   BMI 28.66 kg/m    Mild Pain (3)         Reason for Visit:    What part of your body is injured / painful?  right wrist    What caused the injury /pain? Overuse injury from regular activity    How long ago did your injury occur or pain begin? several weeks ago    What are your most bothersome symptoms? Pain    How would you characterize your symptom? aching    What makes your symptoms better? Rest and Wrap or brace    What makes your symptoms worse? Movement    Have you been previously seen for this problem? No    Medical History:    Medical History:Reviewed      Have you had surgery on this body part before? No    Medications: reviewed    Allergies: Yes: Reviewed       Left hand dominant.      Review of Systems:    Have you recently had a a fever, chills, weight loss? No    Do you have any vision problems? No    Do you have any chest pain or edema? No    Do you have any shortness of breath or wheezing?  No    Do you have stomach problems? No    Do you have any numbness or focal weakness? No    Do you have diabetes? No    Do you have problems with bleeding or clotting? No    Do you have an rashes or other skin lesions? No      "

## 2019-07-31 NOTE — LETTER
"    7/31/2019         RE: Luz Thompson  [address]  [place] [address]        Dear Colleague,    Thank you for [facility] your patient, Luz Thompson, [facility]. Please see a copy of my visit note below.    NEW PATIENT INTAKE QUESTIONNAIRE  [facility] 7/31/2019      Luz Thompson's chief complaint for this visit includes:  Chief Complaint   Patient presents with     Wrist Pain     Right wrist tendinitis      PCP: Jennifer Barraza    Referring Provider:  Suni Cai PA-C  [address] [place] [address]    Ht 1.702 m (5' 7\")   Wt 83 kg (183 lb)   LMP 06/01/1988 (Approximate)   BMI 28.66 kg/m     Mild Pain (3)         Reason for Visit:    What part of your body is injured / painful?  right wrist    What caused the injury /pain? Overuse injury from regular activity    How long ago did your injury occur or pain begin? several weeks ago    What are your most bothersome symptoms? Pain    How would you characterize your symptom? aching    What makes your symptoms better? Rest and Wrap or brace    What makes your symptoms worse? Movement    Have you been previously seen for this problem? No    Medical History:    Medical History:Reviewed      Have you had surgery on this body part before? No    Medications: reviewed    Allergies: Yes: Reviewed       Left hand dominant.      Review of Systems:    Have you recently had a a fever, chills, weight loss? No    Do you have any vision problems? No    Do you have any chest pain or edema? No    Do you have any shortness of breath or wheezing?  No    Do you have stomach problems? No    Do you have any numbness or focal weakness? No    Do you have diabetes? No    Do you have problems with bleeding or clotting? No    Do you have an rashes or other skin lesions? No        HISTORY OF PRESENT ILLNESS  Ms. 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     Prophylactic antibiotic     High risk medication use     Statin intolerance     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     Sensorineural hearing loss (SNHL) of both ears     Abnormal liver function tests     Liver replaced by transplant (H)     Type 2 diabetes, HbA1c goal < 7% (H)     Hyperlipidemia LDL goal <70     Hypertension goal BP (blood pressure) < 140/80     Postoperative hypothyroidism     Type 2 diabetes mellitus with stage 3 chronic kidney disease, without long-term current use of insulin (H)     Age-related osteoporosis without current pathological fracture     Tympanic membrane perforation, left     Other infective chronic otitis externa of left ear     CKD (chronic kidney disease) stage 3, GFR 30-59 ml/min (H)     Pain of right thumb     UTI (urinary tract infection)     Gram negative sepsis (H)     Escherichia coli sepsis (H)       No current outpatient medications on file.       Allergies   Allergen Reactions     Fluconazole Hives and Itching     Azithromycin Itching     Benadryl [Diphenhydramine Hcl]      Insomnia      Cellcept Diarrhea     Ciprofloxacin Other (See Comments)     Insomnia, mood lability, Irregular heart beat, anxiety     Codeine      Psych disturbance     Diphenhydramine Other (See Comments)     Doxycycline      Lansoprazole Diarrhea     Levaquin [Levofloxacin] Other (See Comments)     Headache, hyperactivity     Lisinopril Cough     Methotrexate      Sores     Morphine Sulfate Itching     Mycophenolate Diarrhea     Simvastatin Muscle  "Pain (Myalgia)     severe     Cephalexin Rash     Fever and skin burning     Penicillin G Itching and Rash     Tolectin [Nsaids] Rash     Tramadol Rash       Family History   Problem Relation Age of Onset     Hypertension Mother      Endometrial Cancer Mother      Hyperlipidemia Mother      Prostate Cancer Father      Macular Degeneration Father      Cancer - colorectal Maternal Grandmother         in her 80's, has surgery and removal of part of kidney,  at age 98     Heart Disease Maternal Grandfather          at 98     Glaucoma Maternal Grandfather      Cerebrovascular Disease Paternal Grandmother         in her 80's     Hypertension Paternal Grandmother      Heart Disease Paternal Grandfather         MI     Alzheimer Disease Paternal Grandfather      Allergies Son      Neurologic Disorder Daughter         Migraines     Breast Cancer Other      Anesthesia Reaction No family hx of      Crohn's Disease No family hx of      Ulcerative Colitis No family hx of        Additional medical/Social/Surgical histories reviewed in [facility] and updated as appropriate.     REVIEW OF SYSTEMS (2019)  10 point ROS of systems including Constitutional, Eyes, Respiratory, Cardiovascular, Gastroenterology, Genitourinary, Integumentary, Musculoskeletal, Psychiatric were all negative except for pertinent positives noted in my HPI.     PHYSICAL EXAM  Vitals:    19 1521   Weight: 83 kg (183 lb)   Height: 1.702 m (5' 7\")     Vital Signs: Ht 1.702 m (5' 7\")   Wt 83 kg (183 lb)   LMP 1988 (Approximate)   BMI 28.66 kg/m    Patient declined being weighed. Body mass index is 28.66 kg/m .    General  - normal appearance, in no obvious distress  CV  - normal popliteal pulse  Pulm  - normal respiratory pattern, non-labored  Musculoskeletal - bilateral knee  - stance: non antalgic gait, genu varum  - inspection: no generalized swelling, trace effusion  - palpation: medial joint line tenderness bilaterally, patella and " patellar tendon non-tender, normal popliteal pulse  - ROM: 100 degrees flexion, 0 degrees extension, slightly painful active ROM  - strength: 5/5 in flexion, 5/5 in extension  - neuro: no sensory or motor deficit  - special tests:  (-) Lachman  (-) anterior drawer  (-) posterior drawer  (-) pivot shift  (-) varus at 0 and 30 degrees flexion  (-) valgus at 0 and 30 degrees flexion  (+) Harlan s compression test  (-) patellar apprehension  Neuro  - no sensory or motor deficit, grossly normal coordination, normal muscle tone  Skin  - no ecchymosis, erythema, warmth, or induration, no obvious rash  Psych  - interactive, appropriate, normal mood and affect  Right hand: has ttp over cmc joint and painful gripping  ASSESSMENT & PLAN  68 yo female with right cmc arthritis, bilateral knee osteoarthritis  xrays knees: shows djd  xrays hand shows cmc arthritis  Consider injection in 2 weeks  Cont use of brace  Consider knee injections  Consider imaging of lumbar spine  Start prednisone  PT for knees    Samson Griffith MD, [facility]    Again, thank you for allowing me to participate in the care of your patient.        Sincerely,        Samson Griffith MD

## 2019-07-31 NOTE — PATIENT INSTRUCTIONS
Thanks for coming today.  [facility]  [address] [place] [address]    To schedule future appointments in Ortho Clinic, you may call [phone].    To schedule ordered imaging by your provider:   Call Central Imaging [phone]    To schedule an injection ordered by your provider:  Call Central Imaging Injection scheduling line: [phone]  [facility] available online at:  [email or web address]    Please call if any further questions or concerns ([phone]).  Clinic hours 8 am to 5 pm.    Return to clinic (call) if symptoms worsen or fail to improve.

## 2019-08-02 ENCOUNTER — OFFICE VISIT (OUTPATIENT)
Dept: ALLERGY | Facility: CLINIC | Age: 69
End: 2019-08-02
Payer: MEDICARE

## 2019-08-02 DIAGNOSIS — L71.9 ROSACEA: ICD-10-CM

## 2019-08-02 DIAGNOSIS — Z88.9 MULTIPLE DRUG ALLERGIES: Primary | ICD-10-CM

## 2019-08-02 ASSESSMENT — PAIN SCALES - GENERAL: PAINLEVEL: NO PAIN (0)

## 2019-08-02 NOTE — LETTER
"    8/2/2019         RE: Luz Thompson  [address]  [facility] [address]        Dear Colleague,    Thank you for referring your patient, uLz Thompson, to the [facility]. Please see a copy of my visit note below.    [facility] Dermatology Note    Dermatology Problem List:  1. s/p liver transplant for PBC (2002)   - current: sirolimus, prednisone   2. Pruritic papular skin eruption-self-induced, resolved  3. Mild atopic dermatitis- hands, thumbs, abdomen, back   - previous: Amlactin, patient discontinued   4. Actinic keratoses- right temple, left cheek, s/p LN2  5. Onychomycosis-  Oral voriconazole, prescribed by Dr. Montero (IM)   6. Drug hypersensitivity reaction- fluconazole  - residual lesions of abdomen and chest   7. Atrophe shanna  8. Drug allergies   -s/p intradermal testing to several ABX 8/2019, noncontributory  -plan: oral provocation testing     Encounter Date: Aug 2, 2019    CC:  Chief Complaint   Patient presents with     Allergies     Luz morales here for drug testing day 5     History of Present Illness:  Ms. Luz Thompson is a 69 year old female who presents today for drug testing. She was last seen 2 weeks ago for a skin check and rosacea with periocular involvement. At that time, she also reported multiple drug allergies mainly to antibiotics, including a severe reaction to fluconazole (she describes this as a \"full body hive\" but couldn't tell if the lesions were coming and going within 24 hours since the rash was so extensive), and rashes/itching with azithromycin, doxycycline, penicillin, and cephalexin.   For her rosacea, she was started on metronidazole gel which she reports is working fairly well.     Past Medical History:   Patient Active Problem List   Diagnosis     Bladder infection, chronic     Osteoarthritis of right knee     HDL deficiency     Advanced directives, counseling/discussion     Vitamin D deficiency     Status post " Consent was obtained and benefits and risks explained to the patient. Ms. Addie Cavazos agreed to continue with the procedure of right foot debridement of dorsal and medial ulcers. The area was prepped and sharply debrided using a #11 scalpel of >20sq cm of nonviable tissue down to the level of muscle. The patient tolerated the procedure and there were no complications. A wet to dry dressing was applied.      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     Prophylactic antibiotic     High risk medication use     Statin intolerance     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     Sensorineural hearing loss (SNHL) of both ears     Abnormal liver function tests     Liver replaced by transplant (H)     Type 2 diabetes, HbA1c goal < 7% (H)     Hyperlipidemia LDL goal <70     Hypertension goal BP (blood pressure) < 140/80     Postoperative hypothyroidism     Type 2 diabetes mellitus with stage 3 chronic kidney disease, without long-term current use of insulin (H)     Age-related osteoporosis without current pathological fracture     Tympanic membrane perforation, left     Other infective chronic otitis externa of left ear     CKD (chronic kidney disease) stage 3, GFR 30-59 ml/min (H)     Past Medical History:   Diagnosis Date     Anemia of other chronic disease 10/17/2011     Anxiety      Bladder infection, chronic 4/4/2012     CKD (chronic kidney disease) stage 3, GFR 30-59 ml/min (H) 4/4/2012     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      Diverticulosis of sigmoid colon 12/21/2013     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     Received Rituxan during Summer of 2016     H/O esophageal varices      Hearing loss      Heart murmur 4/4/2012     History of DVT (deep vein thrombosis)      History of [place] fever      History of thyroid cancer 9/25/2012     Hyperlipidemia 4/10/2012    Says that she does not have it anymore, not on meds     Hyperlipidemia LDL goal <70      Hypertension goal BP (blood pressure) < 140/80 11/06/2013     Hypertriglyceridemia      Liver replaced by transplant (H) 10/17/2011    Dr. 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     Past Surgical History:   Procedure Laterality Date     APPENDECTOMY  1961     BIOPSY       CATARACT IOL, RT/LT      RE12/19/2013, LE12/10/2013 - Toric lenses     CHOLECYSTECTOMY  1991     COLONOSCOPY  3/10/2014    Procedure: COLONOSCOPY;;  Surgeon: Gentry Ramirez MD;  Location: [facility] GI     ear drum repair       ENDOBRONCHIAL ULTRASOUND FLEXIBLE N/A 9/29/2017    Procedure: ENDOBRONCHIAL ULTRASOUND FLEXIBLE;  Flexible Bronchoscopy, Endobronchial Ultrasound, Transbronchial Needle Aspiration ;  Surgeon: Eden Clinton MD;  Location: [facility] OR     ENDOSCOPIC RETROGRADE CHOLANGIOPANCREATOGRAM  9/19/2013    Procedure: ENDOSCOPIC RETROGRADE CHOLANGIOPANCREATOGRAM;  Endoscopic Retrograde Cholangiopancreatogram with single balloon enteroscopy, ballon sweep of bile duct;  Surgeon: Brett Membreno MD;  Location: [facility] OR      KNEE SCOPE,MED/LAT MENISECTOMY  8/10/12    Right, partial medial menisectomy only     KNEE SURGERY  1966    R knee     PICC INSERTION  9/18/2013    4fr SL PASV PICC, 40cm (1cm external) in the R basilic vein w/ tip in the low SVC     PICC INSERTION  2/21/2014    5 fr DL BioFlo Navilyst PICC, 46 cm (3 cm external) in the L basilic vein w/ tip in the SVC RA junction.     THYROIDECTOMY  3/2010     TRANSPLANT LIVER RECIPIENT LIVING UNRELATED         Social History:  Patient reports that she quit smoking about 34 years ago. Her smoking use included cigarettes. She has a 18.00 pack-year smoking history. She has never used smokeless tobacco. She reports that she drinks alcohol. She reports that she does not use drugs.    Family History:  Family History   Problem Relation Age of Onset      Hypertension Mother      Endometrial Cancer Mother      Hyperlipidemia Mother      Prostate Cancer Father      Macular Degeneration Father      Cancer - colorectal Maternal Grandmother         in her 80's, has surgery and removal of part of kidney,  at age 98     Heart Disease Maternal Grandfather          at 98     Glaucoma Maternal Grandfather      Cerebrovascular Disease Paternal Grandmother         in her 80's     Hypertension Paternal Grandmother      Heart Disease Paternal Grandfather         MI     Alzheimer Disease Paternal Grandfather      Allergies Son      Neurologic Disorder Daughter         Migraines     Breast Cancer Other      Anesthesia Reaction No family hx of      Crohn's Disease No family hx of      Ulcerative Colitis No family hx of        Medications:  Current Outpatient Medications   Medication Sig Dispense Refill     acetaminophen 500 MG CAPS Take 1,000 mg by mouth nightly as needed Take 500-1,000 mg by mouth every 6 hours if needed.        alirocumab (PRALUENT) 150 MG/ML injectable pen Inject 1 mL (150 mg) Subcutaneous every 14 days 6 mL 3     ampicillin (OMNIPEN) 250 MG injection For allergy testing 1 each 0     azithromycin (ZITHROMAX) 500 MG vial For allergy testing 1 each 0     benzoyl peroxide 5 % external liquid Use daily as directed 148 g 1     blood glucose monitoring (NO BRAND SPECIFIED) meter device kit Use to test blood sugar 2times daily or as directed. 1 kit 0     blood glucose monitoring (NO BRAND SPECIFIED) test strip Use to test blood sugar 2 times daily, before breakfast and before bedtime 200 each 3     calcitRIOL (ROCALTROL) 0.5 MCG capsule Take 1 capsule (0.5 mcg) by mouth daily 90 capsule 3     cefTAZidime (FORTAZ) 1 GM vial For Allergy Testing in Allergy Clinic Only 1 each 0     ciprofloxacin-dexamethasone (CIPRODEX) otic suspension Place 4 drops Into the left ear three times a week 5.6 mL 6     clotrimazole (LOTRIMIN) 1 % cream Apply topically 2 times daily as  needed Reported on 4/25/2017       doxycycline (VIBRAMYCIN) 100 mg vial to attach to  mL bag For allergy testing 10 mL 0     ELIQUIS 2.5 MG tablet Take 1 tablet (2.5 mg) by mouth 2 times daily 180 tablet 3     estradiol (ESTRACE VAGINAL) 0.1 MG/GM cream Place 2 g vaginally twice a week 42.5 g 11     estradiol (VAGIFEM) 10 MCG TABS vaginal tablet Place 1 tablet (10 mcg) vaginally twice a week 8 tablet 11     ezetimibe (ZETIA) 10 MG tablet TAKE 1 TABLET BY MOUTH EVERY DAY OR AS DIRECTED BY DR WOLF 90 tablet 3     ferrous gluconate (FERGON) 324 (38 Fe) MG tablet Take 1 tablet (324 mg) by mouth 3 times daily (with meals) 90 tablet 0     fluconazole (DIFLUCAN) 200-0.9 MG/100ML-% intermittent infusion For allergy testing 100 mL 0     folic acid (FOLVITE) 1 MG tablet Take 1 tablet (1 mg) by mouth daily 100 tablet 3     furosemide (LASIX) 20 MG tablet TAKE 1/2 TABLET BY MOUTH EVERY DAY 45 tablet 3     hydrALAZINE (APRESOLINE) 25 MG tablet Take 0.5 tablets (12.5 mg) by mouth 3 times daily as needed , if systolic blood pressure is more than 140 mmHg. 270 tablet 3     Hypromellose (ARTIFICIAL TEARS OP) Apply 1 drop to eye as needed       levothyroxine (SYNTHROID/LEVOTHROID) 150 MCG tablet TAKE 1.5 TABLETS BY MOUTH ON MONDAYS. AND 1 TABLET DAILY, THE OTHER DAYS OF THE WEEK. 102 tablet 3     meclizine (ANTIVERT) 25 MG tablet Take 1 tablet (25 mg) by mouth as needed 30 tablet 11     methenamine (HIPREX) 1 g tablet Take 1 tablet (1 g) by mouth At Bedtime 30 tablet 11     metoprolol succinate (TOPROL-XL) 50 MG 24 hr tablet Take 1 tablet (50 mg) by mouth daily 90 tablet 3     metroNIDAZOLE (METROCREAM) 0.75 % cream Apply topically 2 times daily 45 g 5     metroNIDAZOLE (METROGEL) 0.75 % external gel Apply topically 2 times daily Put on face 45 g 3     Multiple Vitamins-Minerals (PRESERVISION AREDS 2) CAPS Take 1 capsule by mouth 2 times daily       nitroFURantoin, macrocrystal-monohydrate, (MACROBID) 100 MG capsule Take 1  capsule (100 mg) by mouth 2 times daily For one week at onset of UTI symptoms 14 capsule 6     omega-3 acid ethyl esters (LOVAZA) 1 g capsule Take 1 capsule (1 g) by mouth 2 times daily 360 capsule 3     order for DME Equipment being ordered: right brace with thumb 1 Device 0     posaconazole (NOXAFIL) 100 MG EC tablet Take 3 tablets (300 mg) by mouth every morning 90 tablet 11     predniSONE (DELTASONE) 20 MG tablet Take 1 tablet (20 mg) by mouth 2 times daily 10 tablet 0     predniSONE (DELTASONE) 5 MG tablet Take 1 tablet (5 mg) by mouth daily 90 tablet 3     RAPAMUNE (BRAND) 1 MG tablet Take 1 tablet (1 mg) by mouth every other day 45 tablet 3     sertraline (ZOLOFT) 100 MG tablet Take 1 tab (100mg) PO daily (Patient taking differently: Take 50 mg by mouth daily ) 90 tablet 0     SUMAtriptan (IMITREX) 50 MG tablet Take 1 tablet (50 mg) by mouth at onset of headache for migraine repeat after 2 hours if needed. 30 tablet 3     triamcinolone (KENALOG) 0.025 % cream Apply topically 2 times daily To eyelids 15 g 1     triamcinolone (KENALOG) 0.1 % cream Apply topically 2 times daily as needed for irritation       ursodiol (ACTIGALL) 250 MG tablet TAKE 1 TABLET BY MOUTH TWICE A  tablet 3     voriconazole (VFEND) 200 MG tablet Take 1 tablet (200 mg) by mouth 2 times daily 180 tablet 0     Wheat Dextrin (BENEFIBER) POWD 2 teaspoons daily         Allergies   Allergen Reactions     Fluconazole Hives and Itching     Ciprofloxacin Anxiety and Other (See Comments)     Irregular heart beat     Azithromycin Itching     Benadryl [Diphenhydramine Hcl]      Insomnia      Cellcept Diarrhea     Ciprofloxacin Other (See Comments)     Insomnia, mood lability     Codeine      Psych disturbance     Codeine      Diphenhydramine Other (See Comments)     Doxycycline      Lansoprazole Diarrhea     Levaquin [Levofloxacin] Other (See Comments)     Headache, hyperactivity     Lisinopril Cough     Methotrexate      Sores      Methotrexate      Morphine Sulfate Itching     Mycophenolate Diarrhea     No Clinical Screening - See Comments      Simvastatin Muscle Pain (Myalgia)     severe     Cephalexin Rash     Fever and skin burning     Penicillin G Itching and Rash     Tolectin [Nsaids] Rash     Tramadol Rash       Review of Systems:  -As per HPI  -Constitutional: Otherwise feeling well today, in usual state of health.  -Skin: As above in HPI. No additional skin concerns.    Physical exam:  Vitals: LMP 06/01/1988 (Approximate)   GEN: This is a well developed, well-nourished female in no acute distress, in a pleasant mood.    SKIN: Focused examination of the face and eyes was performed.  -Face with scattered non-descript pink papules that are mostly excoriated.   -No other lesions of concern on areas examined.     Allergy tests:  Drug allergy tests 07/30/2019  Prick tests negatives with Histamine ++    Order documented by: Maureen Oliveira LPN  Order reviewed by:  Physician:    Date/time of application: 7/30/19  Localization of application: forearm    DRUG ALLERGY TEST SERIES     ANTIBIOTICS      Prick Tests        Substance/ Allergen Conc Result (20 min) Remarks   Histamine Hydrochloride (ALK) 0.1 mg/ml ++    NaCl 0.9% -    Doxycycline 100 mg/ 5ml - 1   Azithromycin 100 mg/ml - 2   Fluconazole 2mg/ml - 3   Penicillin G 1 Million international unit(s)/ml - 4   Ampicillin 250 mg/ml - 5   Cefazolin 100 mg/ ml - 6   Ceftriaxone 100 mg/ ml - 7   Ceftazidime 100 mg/ ml - 8     Intradermal Tests   immed immed delay delay     Substance Conc 1st dil  2nd dil  days  days remarks   Doxycyclin 1:100 - -   1   Azithromycin 1:100 - -   2   Fluconazole 1:100 - -   3   Penicillin G 1:100 - -   4   Ampicillin 1:100 - -   5   Cefazolin 1:5 - -   6   Ceftriaxone 1:5 - -   7   Ceftazidime 1:5 - -   8   * Only with limited indications      Patch Tests  as is as is 1:2 1:2     As Is  Vas Substance Conc 3 days days 3 days days remarks   1   Doxycycline 100 mg/ 5ml -  -     2  Azithromycin 100mg/ml -  -     3  Fluconazole 2mg/ml -  -     4  Penicillin G 1 Million international unit(s)/ ml  -  -     5  Ampicillin 250mg/ml -  -     6  Cefazolin 100 mg/ ml -  -     7  Ceftriaxone 100 mg/ ml -  -     8  Ceftazidime 100 mg/ ml -  -       Impression/Plan:  1. Reported history of multiple drug allergies particularly to antibiotics and fluconazole = skin tests without immediate and delayed reactions   - Prick and intradermal and patch testing to fluconazole, doxycycline, azithromycin, penicillins, and cephalosporins with immediate and delayed readings  being negative.   Some problem with delayed readings, because patient got 2 days after the testing (ID and Patch)  40 mg daily for 5 days course of oral prednisone for joint pain by orthopedics = (although the timing is unfortunate and may have interfered with a delayed hypersensitivity reaction, we would hope to ideally see any reactions within 24-48 hours, so it is possible that the oral prednisone did not interfere with our testing).  - also of note, patient is immunosuppressed with rapamune and prednisone but she had her reactions when she was immunosuppressed.   - she states she will never again take fluconazole and has no desire to do any oral provocation with this medication.   - plan for oral provocation testing in future with penicillins if patient is amenable but she would like to discuss this with our infectious disease colleagues. We would appreciate any weigh in from infectious disease to determine whether they believe any of these medications may be important in the future (such as the penicillins). If they agree, we would plan for two visits for a blinded provocation study.   -because our suspicion is low for allergy to the above medications, she could reasonable receive them under close observation.    2. Acne rosacea - improving on metronidazole gel.  - Consider doxycycline therapy if drug  testing is negative.     Patient was staffed with Dr. Rai.     Sendy Dudley MD  Dermatology Resident PGY3  Staff Physician Comments:  I saw and evaluated the patient with the resident and I agree with the assessment and plan as documented in the resident's note. I performed the skin tests myself     Will Rai MD  Professor  Head of Dermato-Allergy Division  Department of Dermatology  [facility]    I spent a total of 25 minutes face to face with Luz Thompson during today s office visit. Over 50% of this time was spent counseling the patient and/or coordinating care. Please see Assessment and Plan for details.      Again, thank you for allowing me to participate in the care of your patient.        Sincerely,        Will Rai MD

## 2019-08-02 NOTE — NURSING NOTE
Allergy Rooming Note    Luz Thompson's goals for this visit include:   Chief Complaint   Patient presents with     Allergies     Luz morales here for drug testing day 5     Maureen Oliveira LPN

## 2019-08-02 NOTE — PROGRESS NOTES
"[facility] Dermatology Note    Dermatology Problem List:  1. s/p liver transplant for PBC (2002)   - current: sirolimus, prednisone   2. Pruritic papular skin eruption-self-induced, resolved  3. Mild atopic dermatitis- hands, thumbs, abdomen, back   - previous: Amlactin, patient discontinued   4. Actinic keratoses- right temple, left cheek, s/p LN2  5. Onychomycosis-  Oral voriconazole, prescribed by Dr. Montero (IM)   6. Drug hypersensitivity reaction- fluconazole  - residual lesions of abdomen and chest   7. Atrophe shanna  8. Drug allergies   -s/p intradermal testing to several ABX 8/2019, noncontributory  -plan: oral provocation testing     Encounter Date: Aug 2, 2019    CC:  Chief Complaint   Patient presents with     Allergies     Luz morales here for drug testing day 5     History of Present Illness:  Ms. Luz Thompson is a 69 year old female who presents today for drug testing. She was last seen 2 weeks ago for a skin check and rosacea with periocular involvement. At that time, she also reported multiple drug allergies mainly to antibiotics, including a severe reaction to fluconazole (she describes this as a \"full body hive\" but couldn't tell if the lesions were coming and going within 24 hours since the rash was so extensive), and rashes/itching with azithromycin, doxycycline, penicillin, and cephalexin.   For her rosacea, she was started on metronidazole gel which she reports is working fairly well.     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     Prophylactic antibiotic     High risk medication use     Statin intolerance     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     Sensorineural hearing loss (SNHL) of both ears     Abnormal liver function tests     Liver replaced by " "transplant (H)     Type 2 diabetes, HbA1c goal < 7% (H)     Hyperlipidemia LDL goal <70     Hypertension goal BP (blood pressure) < 140/80     Postoperative hypothyroidism     Type 2 diabetes mellitus with stage 3 chronic kidney disease, without long-term current use of insulin (H)     Age-related osteoporosis without current pathological fracture     Tympanic membrane perforation, left     Other infective chronic otitis externa of left ear     CKD (chronic kidney disease) stage 3, GFR 30-59 ml/min (H)     Past Medical History:   Diagnosis Date     Anemia of other chronic disease 10/17/2011     Anxiety      Bladder infection, chronic 4/4/2012     CKD (chronic kidney disease) stage 3, GFR 30-59 ml/min (H) 4/4/2012     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      Diverticulosis of sigmoid colon 12/21/2013     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     Received Rituxan during Summer of 2016     H/O esophageal varices      Hearing loss      Heart murmur 4/4/2012     History of DVT (deep vein thrombosis)      History of Jerauld Moffett fever      History of thyroid cancer 9/25/2012     Hyperlipidemia 4/10/2012    Says that she does not have it anymore, not on meds     Hyperlipidemia LDL goal <70      Hypertension goal BP (blood pressure) < 140/80 11/06/2013     Hypertriglyceridemia      Liver replaced by transplant (H) 10/17/2011    Dr. 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     Past Surgical History:   Procedure Laterality Date     APPENDECTOMY       BIOPSY       CATARACT IOL, RT/LT      RE2013, LE12/10/2013 - Toric lenses     CHOLECYSTECTOMY       COLONOSCOPY  3/10/2014    Procedure: COLONOSCOPY;;  Surgeon: Gentry Ramirez MD;  Location: [facility]     ear drum repair       ENDOBRONCHIAL ULTRASOUND FLEXIBLE N/A 2017    Procedure: ENDOBRONCHIAL ULTRASOUND FLEXIBLE;  Flexible Bronchoscopy, Endobronchial Ultrasound, Transbronchial Needle Aspiration ;  Surgeon: Eden Clinton MD;  Location: [facility] OR     ENDOSCOPIC RETROGRADE CHOLANGIOPANCREATOGRAM  2013    Procedure: ENDOSCOPIC RETROGRADE CHOLANGIOPANCREATOGRAM;  Endoscopic Retrograde Cholangiopancreatogram with single balloon enteroscopy, ballon sweep of bile duct;  Surgeon: Brett Membreno MD;  Location: [facility] OR     HC KNEE SCOPE,MED/LAT MENISECTOMY  8/10/12    Right, partial medial menisectomy only     KNEE SURGERY  1966    R knee     PICC INSERTION  2013    4fr SL PASV PICC, 40cm (1cm external) in the R basilic vein w/ tip in the low SVC     PICC INSERTION  2014    5 fr DL BioFlo Navilyst PICC, 46 cm (3 cm external) in the L basilic vein w/ tip in the SVC RA junction.     THYROIDECTOMY  3/2010     TRANSPLANT LIVER RECIPIENT LIVING UNRELATED         Social History:  Patient reports that she quit smoking about 34 years ago. Her smoking use included cigarettes. She has a 18.00 pack-year smoking history. She has never used smokeless tobacco. She reports that she drinks alcohol. She reports that she does not use drugs.    Family History:  Family History   Problem Relation Age of Onset     Hypertension Mother      Endometrial Cancer Mother      Hyperlipidemia Mother      Prostate Cancer Father      Macular Degeneration Father      Cancer - colorectal Maternal Grandmother         in her 80's, has surgery and removal of part of kidney,  at age  98     Heart Disease Maternal Grandfather          at 98     Glaucoma Maternal Grandfather      Cerebrovascular Disease Paternal Grandmother         in her 80's     Hypertension Paternal Grandmother      Heart Disease Paternal Grandfather         MI     Alzheimer Disease Paternal Grandfather      Allergies Son      Neurologic Disorder Daughter         Migraines     Breast Cancer Other      Anesthesia Reaction No family hx of      Crohn's Disease No family hx of      Ulcerative Colitis No family hx of        Medications:  Current Outpatient Medications   Medication Sig Dispense Refill     acetaminophen 500 MG CAPS Take 1,000 mg by mouth nightly as needed Take 500-1,000 mg by mouth every 6 hours if needed.        alirocumab (PRALUENT) 150 MG/ML injectable pen Inject 1 mL (150 mg) Subcutaneous every 14 days 6 mL 3     ampicillin (OMNIPEN) 250 MG injection For allergy testing 1 each 0     azithromycin (ZITHROMAX) 500 MG vial For allergy testing 1 each 0     benzoyl peroxide 5 % external liquid Use daily as directed 148 g 1     blood glucose monitoring (NO BRAND SPECIFIED) meter device kit Use to test blood sugar 2times daily or as directed. 1 kit 0     blood glucose monitoring (NO BRAND SPECIFIED) test strip Use to test blood sugar 2 times daily, before breakfast and before bedtime 200 each 3     calcitRIOL (ROCALTROL) 0.5 MCG capsule Take 1 capsule (0.5 mcg) by mouth daily 90 capsule 3     cefTAZidime (FORTAZ) 1 GM vial For Allergy Testing in Allergy Clinic Only 1 each 0     ciprofloxacin-dexamethasone (CIPRODEX) otic suspension Place 4 drops Into the left ear three times a week 5.6 mL 6     clotrimazole (LOTRIMIN) 1 % cream Apply topically 2 times daily as needed Reported on 2017       doxycycline (VIBRAMYCIN) 100 mg vial to attach to  mL bag For allergy testing 10 mL 0     ELIQUIS 2.5 MG tablet Take 1 tablet (2.5 mg) by mouth 2 times daily 180 tablet 3     estradiol (ESTRACE VAGINAL) 0.1 MG/GM cream  Place 2 g vaginally twice a week 42.5 g 11     estradiol (VAGIFEM) 10 MCG TABS vaginal tablet Place 1 tablet (10 mcg) vaginally twice a week 8 tablet 11     ezetimibe (ZETIA) 10 MG tablet TAKE 1 TABLET BY MOUTH EVERY DAY OR AS DIRECTED BY DR WOLF 90 tablet 3     ferrous gluconate (FERGON) 324 (38 Fe) MG tablet Take 1 tablet (324 mg) by mouth 3 times daily (with meals) 90 tablet 0     fluconazole (DIFLUCAN) 200-0.9 MG/100ML-% intermittent infusion For allergy testing 100 mL 0     folic acid (FOLVITE) 1 MG tablet Take 1 tablet (1 mg) by mouth daily 100 tablet 3     furosemide (LASIX) 20 MG tablet TAKE 1/2 TABLET BY MOUTH EVERY DAY 45 tablet 3     hydrALAZINE (APRESOLINE) 25 MG tablet Take 0.5 tablets (12.5 mg) by mouth 3 times daily as needed , if systolic blood pressure is more than 140 mmHg. 270 tablet 3     Hypromellose (ARTIFICIAL TEARS OP) Apply 1 drop to eye as needed       levothyroxine (SYNTHROID/LEVOTHROID) 150 MCG tablet TAKE 1.5 TABLETS BY MOUTH ON MONDAYS. AND 1 TABLET DAILY, THE OTHER DAYS OF THE WEEK. 102 tablet 3     meclizine (ANTIVERT) 25 MG tablet Take 1 tablet (25 mg) by mouth as needed 30 tablet 11     methenamine (HIPREX) 1 g tablet Take 1 tablet (1 g) by mouth At Bedtime 30 tablet 11     metoprolol succinate (TOPROL-XL) 50 MG 24 hr tablet Take 1 tablet (50 mg) by mouth daily 90 tablet 3     metroNIDAZOLE (METROCREAM) 0.75 % cream Apply topically 2 times daily 45 g 5     metroNIDAZOLE (METROGEL) 0.75 % external gel Apply topically 2 times daily Put on face 45 g 3     Multiple Vitamins-Minerals (PRESERVISION AREDS 2) CAPS Take 1 capsule by mouth 2 times daily       nitroFURantoin, macrocrystal-monohydrate, (MACROBID) 100 MG capsule Take 1 capsule (100 mg) by mouth 2 times daily For one week at onset of UTI symptoms 14 capsule 6     omega-3 acid ethyl esters (LOVAZA) 1 g capsule Take 1 capsule (1 g) by mouth 2 times daily 360 capsule 3     order for DME Equipment being ordered: right brace with  thumb 1 Device 0     posaconazole (NOXAFIL) 100 MG EC tablet Take 3 tablets (300 mg) by mouth every morning 90 tablet 11     predniSONE (DELTASONE) 20 MG tablet Take 1 tablet (20 mg) by mouth 2 times daily 10 tablet 0     predniSONE (DELTASONE) 5 MG tablet Take 1 tablet (5 mg) by mouth daily 90 tablet 3     RAPAMUNE (BRAND) 1 MG tablet Take 1 tablet (1 mg) by mouth every other day 45 tablet 3     sertraline (ZOLOFT) 100 MG tablet Take 1 tab (100mg) PO daily (Patient taking differently: Take 50 mg by mouth daily ) 90 tablet 0     SUMAtriptan (IMITREX) 50 MG tablet Take 1 tablet (50 mg) by mouth at onset of headache for migraine repeat after 2 hours if needed. 30 tablet 3     triamcinolone (KENALOG) 0.025 % cream Apply topically 2 times daily To eyelids 15 g 1     triamcinolone (KENALOG) 0.1 % cream Apply topically 2 times daily as needed for irritation       ursodiol (ACTIGALL) 250 MG tablet TAKE 1 TABLET BY MOUTH TWICE A  tablet 3     voriconazole (VFEND) 200 MG tablet Take 1 tablet (200 mg) by mouth 2 times daily 180 tablet 0     Wheat Dextrin (BENEFIBER) POWD 2 teaspoons daily         Allergies   Allergen Reactions     Fluconazole Hives and Itching     Ciprofloxacin Anxiety and Other (See Comments)     Irregular heart beat     Azithromycin Itching     Benadryl [Diphenhydramine Hcl]      Insomnia      Cellcept Diarrhea     Ciprofloxacin Other (See Comments)     Insomnia, mood lability     Codeine      Psych disturbance     Codeine      Diphenhydramine Other (See Comments)     Doxycycline      Lansoprazole Diarrhea     Levaquin [Levofloxacin] Other (See Comments)     Headache, hyperactivity     Lisinopril Cough     Methotrexate      Sores     Methotrexate      Morphine Sulfate Itching     Mycophenolate Diarrhea     No Clinical Screening - See Comments      Simvastatin Muscle Pain (Myalgia)     severe     Cephalexin Rash     Fever and skin burning     Penicillin G Itching and Rash     Tolectin [Nsaids] Rash      Tramadol Rash       Review of Systems:  -As per HPI  -Constitutional: Otherwise feeling well today, in usual state of health.  -Skin: As above in HPI. No additional skin concerns.    Physical exam:  Vitals: LMP 06/01/1988 (Approximate)   GEN: This is a well developed, well-nourished female in no acute distress, in a pleasant mood.    SKIN: Focused examination of the face and eyes was performed.  -Face with scattered non-descript pink papules that are mostly excoriated.   -No other lesions of concern on areas examined.     Allergy tests:  Drug allergy tests 07/30/2019  Prick tests negatives with Histamine ++    Order documented by: Maureen Oliveira LPN  Order reviewed by:  Physician:    Date/time of application: 7/30/19  Localization of application: forearm    DRUG ALLERGY TEST SERIES     ANTIBIOTICS      Prick Tests        Substance/ Allergen Conc Result (20 min) Remarks   Histamine Hydrochloride (ALK) 0.1 mg/ml ++    NaCl 0.9% -    Doxycycline 100 mg/ 5ml - 1   Azithromycin 100 mg/ml - 2   Fluconazole 2mg/ml - 3   Penicillin G 1 Million international unit(s)/ml - 4   Ampicillin 250 mg/ml - 5   Cefazolin 100 mg/ ml - 6   Ceftriaxone 100 mg/ ml - 7   Ceftazidime 100 mg/ ml - 8     Intradermal Tests   immed immed delay delay     Substance Conc 1st dil  2nd dil  days  days remarks   Doxycyclin 1:100 - -   1   Azithromycin 1:100 - -   2   Fluconazole 1:100 - -   3   Penicillin G 1:100 - -   4   Ampicillin 1:100 - -   5   Cefazolin 1:5 - -   6   Ceftriaxone 1:5 - -   7   Ceftazidime 1:5 - -   8   * Only with limited indications      Patch Tests  as is as is 1:2 1:2     As Is  Vas Substance Conc 3 days days 3 days days remarks   1  Doxycycline 100 mg/ 5ml -  -     2  Azithromycin 100mg/ml -  -     3  Fluconazole 2mg/ml -  -     4  Penicillin G 1 Million international unit(s)/ ml  -  -     5  Ampicillin 250mg/ml -  -     6  Cefazolin 100 mg/ ml -  -     7  Ceftriaxone 100 mg/ ml -  -     8  Ceftazidime 100  mg/ ml -  -       Impression/Plan:  1. Reported history of multiple drug allergies particularly to antibiotics and fluconazole = skin tests without immediate and delayed reactions   - Prick and intradermal and patch testing to fluconazole, doxycycline, azithromycin, penicillins, and cephalosporins with immediate and delayed readings  being negative.   Some problem with delayed readings, because patient got 2 days after the testing (ID and Patch)  40 mg daily for 5 days course of oral prednisone for joint pain by orthopedics = (although the timing is unfortunate and may have interfered with a delayed hypersensitivity reaction, we would hope to ideally see any reactions within 24-48 hours, so it is possible that the oral prednisone did not interfere with our testing).  - also of note, patient is immunosuppressed with rapamune and prednisone but she had her reactions when she was immunosuppressed.   - she states she will never again take fluconazole and has no desire to do any oral provocation with this medication.   - plan for oral provocation testing in future with penicillins if patient is amenable but she would like to discuss this with our infectious disease colleagues. We would appreciate any weigh in from infectious disease to determine whether they believe any of these medications may be important in the future (such as the penicillins). If they agree, we would plan for two visits for a blinded provocation study.   -because our suspicion is low for allergy to the above medications, she could reasonable receive them under close observation.    2. Acne rosacea - improving on metronidazole gel.  - Consider doxycycline therapy if drug testing is negative.     Patient was staffed with Dr. Rai.     Sendy Dudley MD  Dermatology Resident PGY3  Staff Physician Comments:  I saw and evaluated the patient with the resident and I agree with the assessment and plan as documented in the resident's note. I performed  the skin tests myself     Will Rai MD  Professor  Head of Dermato-Allergy Division  Department of Dermatology  [facility], [facility]    I spent a total of 25 minutes face to face with Luz Thompson during today s office visit. Over 50% of this time was spent counseling the patient and/or coordinating care. Please see Assessment and Plan for details.

## 2019-08-04 ENCOUNTER — MYC MEDICAL ADVICE (OUTPATIENT)
Dept: INFECTIOUS DISEASES | Facility: CLINIC | Age: 69
End: 2019-08-04

## 2019-08-04 NOTE — PROGRESS NOTES
Cardiology Clinic Note            HPI:  Luz Thompson is a 69 year old with a history of paroxysmal a-fib on apixiban, hypertension, hyperlipidemia and hypertriglyceridemia w/statin intolerance, CKD and PBC s/p liver transplant in 2002 who presents for routine follow-up of lipid management. The patient was referred to Dr. Reyna in 6/2017 for lipid management. At the time she was noted to have an elevated LDL (198), total cholesterol (322), and triglyceride (448) levels. She did not tolerate simvastatin or pravastatin due to myalgias. She was started on Zetia 10 mg which she tolerated but lipids remained significantly elevated. In 10/2018 she was started on Praluent 75 mg Q 14 days. Most recent labs 5/2019 shows cholesterol levels at goal.  Ms. Thompson states that she has had a difficult year due to the passing of her significant other. She is currently living with her cousin, but is planning to move to [place] in September. She is not very active at the moment due to arthritis. She has not experienced any recent chest pain, shortness of breath, orthopnea or PND. She reports a 13 lb weight gain due to inactivity and also fluid retention. She reports lower extremity swelling which has been improving since her nephrologist increased her lasix to 20 mg daily. She denies recent lightheadedness, palpitations or syncope.  In regards to diet, over the past few months she was eating more pre-packed foods because she doesn't have a full kitchen, but recently started preparing her own food again and is eating healthier. Her home blood pressures have been running high 150-160/80-90 and she has needed PRN hydralazine almost daily for the past 1-2 weeks. Her nephrologist recently started her on losartan 25 mg daily which she has not yet taken because she wanted our input first.  She is complicant with her current cardiac medications including Eliquis 2.5 mg BID, metoprolol XL 50 mg daily, lasix 20 mg, hydralazine 25 mg PRN,  "Zetia 10 mg, Lovaza 1 mg BID, praluent 150 Q 14 days.     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      Diverticulosis of sigmoid colon 12/21/2013     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     Received Rituxan during Summer of 2016     H/O esophageal varices      Hearing loss      Heart murmur 4/4/2012     History of DVT (deep vein thrombosis)      History of [facility] fever      History of thyroid cancer 9/25/2012     Hyperlipidemia 4/10/2012    Says that she does not have it anymore, not on meds     Hyperlipidemia LDL goal <70      Hypertension goal BP (blood pressure) < 140/80 11/06/2013     Hypertriglyceridemia      Liver replaced by transplant (H) 10/17/2011    Dr. 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    alirocumab (PRALUENT) 150 MG/ML injectable pen Inject 1 mL (150 mg) Subcutaneous every 14 days   ampicillin (OMNIPEN) 250 MG injection For allergy testing   azithromycin " (ZITHROMAX) 500 MG vial For allergy testing   benzoyl peroxide 5 % external liquid Use daily as directed   blood glucose monitoring (NO BRAND SPECIFIED) meter device kit Use to test blood sugar 2times daily or as directed.   blood glucose monitoring (NO BRAND SPECIFIED) test strip Use to test blood sugar 2 times daily, before breakfast and before bedtime   calcitRIOL (ROCALTROL) 0.5 MCG capsule Take 1 capsule (0.5 mcg) by mouth daily   cefTAZidime (FORTAZ) 1 GM vial For Allergy Testing in Allergy Clinic Only   ciprofloxacin-dexamethasone (CIPRODEX) otic suspension Place 4 drops Into the left ear three times a week   clotrimazole (LOTRIMIN) 1 % cream Apply topically 2 times daily as needed Reported on 4/25/2017   doxycycline (VIBRAMYCIN) 100 mg vial to attach to  mL bag For allergy testing   ELIQUIS 2.5 MG tablet Take 1 tablet (2.5 mg) by mouth 2 times daily   estradiol (ESTRACE VAGINAL) 0.1 MG/GM cream Place 2 g vaginally twice a week   estradiol (VAGIFEM) 10 MCG TABS vaginal tablet Place 1 tablet (10 mcg) vaginally twice a week   ezetimibe (ZETIA) 10 MG tablet TAKE 1 TABLET BY MOUTH EVERY DAY OR AS DIRECTED BY DR WOLF   ferrous gluconate (FERGON) 324 (38 Fe) MG tablet Take 1 tablet (324 mg) by mouth 3 times daily (with meals)   fluconazole (DIFLUCAN) 200-0.9 MG/100ML-% intermittent infusion For allergy testing   folic acid (FOLVITE) 1 MG tablet Take 1 tablet (1 mg) by mouth daily   furosemide (LASIX) 20 MG tablet TAKE 1/2 TABLET BY MOUTH EVERY DAY   hydrALAZINE (APRESOLINE) 25 MG tablet Take 0.5 tablets (12.5 mg) by mouth 3 times daily as needed , if systolic blood pressure is more than 140 mmHg.   Hypromellose (ARTIFICIAL TEARS OP) Apply 1 drop to eye as needed   levothyroxine (SYNTHROID/LEVOTHROID) 150 MCG tablet TAKE 1.5 TABLETS BY MOUTH ON MONDAYS. AND 1 TABLET DAILY, THE OTHER DAYS OF THE WEEK.   meclizine (ANTIVERT) 25 MG tablet Take 1 tablet (25 mg) by mouth as needed   methenamine (HIPREX) 1 g  tablet Take 1 tablet (1 g) by mouth At Bedtime   metoprolol succinate (TOPROL-XL) 50 MG 24 hr tablet Take 1 tablet (50 mg) by mouth daily   metroNIDAZOLE (METROCREAM) 0.75 % cream Apply topically 2 times daily   metroNIDAZOLE (METROGEL) 0.75 % external gel Apply topically 2 times daily Put on face   Multiple Vitamins-Minerals (PRESERVISION AREDS 2) CAPS Take 1 capsule by mouth 2 times daily   nitroFURantoin, macrocrystal-monohydrate, (MACROBID) 100 MG capsule Take 1 capsule (100 mg) by mouth 2 times daily For one week at onset of UTI symptoms   omega-3 acid ethyl esters (LOVAZA) 1 g capsule Take 1 capsule (1 g) by mouth 2 times daily   order for DME Equipment being ordered: right brace with thumb   posaconazole (NOXAFIL) 100 MG EC tablet Take 3 tablets (300 mg) by mouth every morning   predniSONE (DELTASONE) 20 MG tablet Take 1 tablet (20 mg) by mouth 2 times daily   predniSONE (DELTASONE) 5 MG tablet Take 1 tablet (5 mg) by mouth daily   RAPAMUNE (BRAND) 1 MG tablet Take 1 tablet (1 mg) by mouth every other day   sertraline (ZOLOFT) 100 MG tablet Take 1 tab (100mg) PO daily (Patient taking differently: Take 50 mg by mouth daily )   SUMAtriptan (IMITREX) 50 MG tablet Take 1 tablet (50 mg) by mouth at onset of headache for migraine repeat after 2 hours if needed.   [] tobramycin (TOBREX) 0.3 % ophthalmic ointment Place 0.5 inches into both eyes 2 times daily for 14 days   triamcinolone (KENALOG) 0.025 % cream Apply topically 2 times daily To eyelids   triamcinolone (KENALOG) 0.1 % cream Apply topically 2 times daily as needed for irritation   ursodiol (ACTIGALL) 250 MG tablet TAKE 1 TABLET BY MOUTH TWICE A DAY   voriconazole (VFEND) 200 MG tablet Take 1 tablet (200 mg) by mouth 2 times daily   Wheat Dextrin (BENEFIBER) POWD 2 teaspoons daily     Current Facility-Administered Medications on File Prior to Visit:  ceFAZolin (ANCEF) injection 500 mg   cefTRIAXone (ROCEPHIN) injection 250 mg   penicillin g  potassium [id number] unit vial INTRADERMAL     Allergies:    Allergies   Allergen Reactions     Fluconazole Hives and Itching     Ciprofloxacin Anxiety and Other (See Comments)     Irregular heart beat     Azithromycin Itching     Benadryl [Diphenhydramine Hcl]      Insomnia      Cellcept Diarrhea     Ciprofloxacin Other (See Comments)     Insomnia, mood lability     Codeine      Psych disturbance     Codeine      Diphenhydramine Other (See Comments)     Doxycycline      Lansoprazole Diarrhea     Levaquin [Levofloxacin] Other (See Comments)     Headache, hyperactivity     Lisinopril Cough     Methotrexate      Sores     Methotrexate      Morphine Sulfate Itching     Mycophenolate Diarrhea     No Clinical Screening - See Comments      Simvastatin Muscle Pain (Myalgia)     severe     Cephalexin Rash     Fever and skin burning     Penicillin G Itching and Rash     Tolectin [Nsaids] Rash     Tramadol Rash       Family and social history:    Family History   Problem Relation Age of Onset     Hypertension Mother      Endometrial Cancer Mother      Hyperlipidemia Mother      Prostate Cancer Father      Macular Degeneration Father      Cancer - colorectal Maternal Grandmother         in her 80's, has surgery and removal of part of kidney,  at age 98     Heart Disease Maternal Grandfather          at 98     Glaucoma Maternal Grandfather      Cerebrovascular Disease Paternal Grandmother         in her 80's     Hypertension Paternal Grandmother      Heart Disease Paternal Grandfather         MI     Alzheimer Disease Paternal Grandfather      Allergies Son      Neurologic Disorder Daughter         Migraines     Breast Cancer Other      Anesthesia Reaction No family hx of      Crohn's Disease No family hx of      Ulcerative Colitis No family hx of        Social History     Socioeconomic History     Marital status:      Spouse name: Robbin Thompson     Number of children: 4     Years of education: 20     Highest  "education level: Not on file   Occupational History     Occupation: Guardian Conservator      Employer: [employer]     Comment: social work     Employer: SELF   Social Needs     Financial resource strain: Not on file     Food insecurity:     Worry: Not on file     Inability: Not on file     Transportation needs:     Medical: Not on file     Non-medical: Not on file   Tobacco Use     Smoking status: Former Smoker     Packs/day: 1.00     Years: 18.00     Pack years: 18.00     Types: Cigarettes     Last attempt to quit: 1985     Years since quittin.3     Smokeless tobacco: Never Used   Substance and Sexual Activity     Alcohol use: Yes     Alcohol/week: 0.0 oz     Comment: rare - \"I toast at weddings\"     Drug use: No     Sexual activity: Yes     Partners: Male     Birth control/protection: Post-menopausal   Lifestyle     Physical activity:     Days per week: Not on file     Minutes per session: Not on file     Stress: Not on file   Relationships     Social connections:     Talks on phone: Not on file     Gets together: Not on file     Attends Jewish service: Not on file     Active member of club or organization: Not on file     Attends meetings of clubs or organizations: Not on file     Relationship status: Not on file     Intimate partner violence:     Fear of current or ex partner: Not on file     Emotionally abused: Not on file     Physically abused: Not on file     Forced sexual activity: Not on file   Other Topics Concern     Parent/sibling w/ CABG, MI or angioplasty before 65F 55M? Not Asked      Service Not Asked     Blood Transfusions Not Asked     Caffeine Concern Not Asked     Occupational Exposure Not Asked     Hobby Hazards Not Asked     Sleep Concern Not Asked     Stress Concern Not Asked     Weight Concern Not Asked     Special Diet Not Asked     Back Care Not Asked     Exercise Yes     Comment: cardio and strengthing     Bike Helmet Not Asked     Seat Belt Not Asked " "    Self-Exams Not Asked   Social History Narrative    She is retired. [place] of [place]. 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     Review of Systems:  Skin: No skin rash or ulcers.  Eyes: No red eye.  Ears/Nose/Throat: No ear discharge, nasal congestion, sore throat or dysphagia.  Respiratory: No cough or hemoptysis.  Cardiovascular: See HPI.    Gastrointestinal: No abdominal pain, nausea, vomiting, hematemesis or melena.  Genitourinary: No increased frequency or urgency of urine. No dysuria or hematuria.  Musculoskeletal: No polyarthralgia or myalgias.  Neurologic: No headaches, seizure or focal weakness.  Psychiatric: No hallucinations.  Hematologic/Lymphatic/Immunologic: No bleeding tendency.  Endocrine: No heat or cold intolerance, abnormal facial hair or alopecia.    Vital signs:  /72 (BP Location: Right arm, Patient Position: Chair, Cuff Size: Adult Large)   Pulse 69   Ht 1.715 m (5' 7.5\")   Wt 85.4 kg (188 lb 4.8 oz)   LMP 06/01/1988 (Approximate)   SpO2 99%   BMI 29.06 kg/m     Wt Readings from Last 2 Encounters:   07/31/19 83 kg (183 lb)   07/09/19 83 kg (183 lb)       Physical Exam:  Gen: NAD.    HEENT: No conjunctival pallor or scleral icterus, MMM. Clear oropharynx.    Neck: No JVD. No thyroid enlargement or cervical adenopathy.    Chest: Clear to auscultation bilaterally.    CV: Normal first and second heart sounds. 2/6 CHRISTOPHER  Abdomen: Soft, non-tender, non-distended, BS+.  Ext: No edema. Warm and well perfused with normal capillary refill.    Skin: No skin rash or ulcers.  Neuro: alert, oriented and appropriately conversant.    Psych: Normal affect and speech.    Labs:    LDL Cholesterol Calculated   Date Value Ref Range Status   05/20/2019 91 <100 mg/dL Final     Comment:     Desirable:       <100 mg/dl   12/28/2018 110 mg/dL Final     Recent Labs   Lab Test  05/18/18   0731  " 10/05/17   0907   09/18/15   0954  05/08/14   0806   CHOL  265*  231*   < >  181  159   HDL  49*  50   < >  63  48*   LDL  Cannot estimate LDL when triglyceride exceeds 400 mg/dL  150*  Cannot estimate LDL when triglyceride exceeds 400 mg/dL  135*   < >  84  88   TRIG  557*  419*   < >  172*  112   CHOLHDLRATIO   --    --    --   2.9  3.3    < > = values in this interval not displayed.       Diagnostics:    Echo 5/2018:  1. The left ventricle is normal in structure, function and size. The visual  ejection fraction is estimated at 60%.  2. The right ventricle is normal in structure, function and size.  3. There is mild to moderate (1-2+) mitral regurgitation.  4. There is no atrial shunt seen. A contrast injection (Bubble Study) was  performed that was negative for flow across the interatrial septum.    Assessment and Plan:  Luz Thompson is a 68 year old with a past medical history significant for paroxysmal a-fib on apixiban, hypertension, hyperlipidemia and hypertriglyceridemia with statin intolerance, CKD and PBC s/p liver transplant in 2002 who presents for routine follow-up.  Hyperlipidemia and hypertriglyceridemia: Lipids are currently at goal with LDL 91 and trig 146 in 5/2019. We discussed the importance of heart healthy diet and daily exercise. Plan to continue zetia 10 mg, lovaza 1 mg BID and praluent 150 mg Q 14 days. She will have lipids drawn at her earliest convenience then annually thereafter.   Hypertension: Home blood pressures are elevated, agree with nephrologist to start losartan 25 mg daily. Continue metoprolol 50 mg XL and hydralazine PRN.  Paraoxysmal a-fib: No recent episodes of AF. Continue metoprolol XL 50 mg daily for rate control and Eliquis 2.5 mg BID for stroke prophylaxis     Follow-up: RTC in 1 year.

## 2019-08-05 ENCOUNTER — OFFICE VISIT (OUTPATIENT)
Dept: NEPHROLOGY | Facility: CLINIC | Age: 69
End: 2019-08-05
Payer: MEDICARE

## 2019-08-05 ENCOUNTER — OFFICE VISIT (OUTPATIENT)
Dept: ENDOCRINOLOGY | Facility: CLINIC | Age: 69
End: 2019-08-05
Payer: MEDICARE

## 2019-08-05 VITALS
WEIGHT: 188 LBS | HEART RATE: 86 BPM | DIASTOLIC BLOOD PRESSURE: 76 MMHG | BODY MASS INDEX: 29.44 KG/M2 | SYSTOLIC BLOOD PRESSURE: 126 MMHG

## 2019-08-05 VITALS
SYSTOLIC BLOOD PRESSURE: 139 MMHG | WEIGHT: 188 LBS | DIASTOLIC BLOOD PRESSURE: 78 MMHG | BODY MASS INDEX: 29.44 KG/M2 | HEART RATE: 71 BPM

## 2019-08-05 DIAGNOSIS — R80.1 PERSISTENT PROTEINURIA: ICD-10-CM

## 2019-08-05 DIAGNOSIS — N18.30 CKD (CHRONIC KIDNEY DISEASE) STAGE 3, GFR 30-59 ML/MIN (H): ICD-10-CM

## 2019-08-05 DIAGNOSIS — E89.0 POSTOPERATIVE HYPOTHYROIDISM: ICD-10-CM

## 2019-08-05 DIAGNOSIS — N18.30 CKD (CHRONIC KIDNEY DISEASE) STAGE 3, GFR 30-59 ML/MIN (H): Primary | ICD-10-CM

## 2019-08-05 DIAGNOSIS — R80.9 PROTEINURIA: ICD-10-CM

## 2019-08-05 DIAGNOSIS — N18.30 TYPE 2 DIABETES MELLITUS WITH STAGE 3 CHRONIC KIDNEY DISEASE, WITHOUT LONG-TERM CURRENT USE OF INSULIN (H): ICD-10-CM

## 2019-08-05 DIAGNOSIS — Z94.4 LIVER REPLACED BY TRANSPLANT (H): Primary | ICD-10-CM

## 2019-08-05 DIAGNOSIS — I10 ESSENTIAL HYPERTENSION: ICD-10-CM

## 2019-08-05 DIAGNOSIS — E89.2 POSTABLATIVE HYPOPARATHYROIDISM (H): ICD-10-CM

## 2019-08-05 DIAGNOSIS — E11.22 TYPE 2 DIABETES MELLITUS WITH STAGE 3 CHRONIC KIDNEY DISEASE, WITHOUT LONG-TERM CURRENT USE OF INSULIN (H): ICD-10-CM

## 2019-08-05 DIAGNOSIS — I10 HYPERTENSION GOAL BP (BLOOD PRESSURE) < 140/80: ICD-10-CM

## 2019-08-05 DIAGNOSIS — M81.0 AGE-RELATED OSTEOPOROSIS WITHOUT CURRENT PATHOLOGICAL FRACTURE: ICD-10-CM

## 2019-08-05 DIAGNOSIS — D50.9 ANEMIA, IRON DEFICIENCY: ICD-10-CM

## 2019-08-05 DIAGNOSIS — E11.9 TYPE 2 DIABETES, HBA1C GOAL < 7% (H): Primary | ICD-10-CM

## 2019-08-05 LAB
ALBUMIN SERPL-MCNC: 3 G/DL (ref 3.4–5)
ALBUMIN UR-MCNC: >=300 MG/DL
ALP SERPL-CCNC: 213 U/L (ref 40–150)
ALT SERPL W P-5'-P-CCNC: 40 U/L (ref 0–50)
ANION GAP SERPL CALCULATED.3IONS-SCNC: 10 MMOL/L (ref 3–14)
APPEARANCE UR: CLEAR
AST SERPL W P-5'-P-CCNC: 25 U/L (ref 0–45)
BILIRUB SERPL-MCNC: 0.3 MG/DL (ref 0.2–1.3)
BILIRUB UR QL STRIP: NEGATIVE
BUN SERPL-MCNC: 36 MG/DL (ref 7–30)
CALCIUM SERPL-MCNC: 8.5 MG/DL (ref 8.5–10.1)
CHLORIDE SERPL-SCNC: 99 MMOL/L (ref 94–109)
CO2 SERPL-SCNC: 30 MMOL/L (ref 20–32)
COLOR UR AUTO: YELLOW
CREAT SERPL-MCNC: 1.41 MG/DL (ref 0.52–1.04)
CREAT UR-MCNC: 34 MG/DL
FERRITIN SERPL-MCNC: 118 NG/ML (ref 8–252)
GFR SERPL CREATININE-BSD FRML MDRD: 38 ML/MIN/{1.73_M2}
GLUCOSE SERPL-MCNC: 269 MG/DL (ref 70–99)
GLUCOSE UR STRIP-MCNC: 100 MG/DL
HBA1C MFR BLD: 7.5 % (ref 4.3–6)
HGB UR QL STRIP: ABNORMAL
IRON SATN MFR SERPL: 21 % (ref 15–46)
IRON SERPL-MCNC: 56 UG/DL (ref 35–180)
KETONES UR STRIP-MCNC: NEGATIVE MG/DL
LEUKOCYTE ESTERASE UR QL STRIP: NEGATIVE
MAGNESIUM SERPL-MCNC: 2.2 MG/DL (ref 1.6–2.3)
MICROALBUMIN UR-MCNC: 1430 MG/L
MICROALBUMIN/CREAT UR: 4268.66 MG/G CR (ref 0–25)
NITRATE UR QL: NEGATIVE
NON-SQ EPI CELLS #/AREA URNS LPF: NORMAL /LPF
PH UR STRIP: 5.5 PH (ref 5–7)
PHOSPHATE SERPL-MCNC: 3.3 MG/DL (ref 2.5–4.5)
POTASSIUM SERPL-SCNC: 2.9 MMOL/L (ref 3.4–5.3)
PROT SERPL-MCNC: 7.5 G/DL (ref 6.8–8.8)
PROT UR-MCNC: 1.83 G/L
PROT/CREAT 24H UR: 5.84 G/G CR (ref 0–0.2)
PTH-INTACT SERPL-MCNC: 130 PG/ML (ref 18–80)
RBC #/AREA URNS AUTO: NORMAL /HPF
SODIUM SERPL-SCNC: 139 MMOL/L (ref 133–144)
SOURCE: ABNORMAL
SP GR UR STRIP: 1.01 (ref 1–1.03)
TIBC SERPL-MCNC: 267 UG/DL (ref 240–430)
URATE SERPL-MCNC: 5.6 MG/DL (ref 2.6–6)
UROBILINOGEN UR STRIP-ACNC: 0.2 EU/DL (ref 0.2–1)
WBC #/AREA URNS AUTO: NORMAL /HPF

## 2019-08-05 PROCEDURE — 84550 ASSAY OF BLOOD/URIC ACID: CPT | Performed by: INTERNAL MEDICINE

## 2019-08-05 PROCEDURE — 86334 IMMUNOFIX E-PHORESIS SERUM: CPT | Performed by: INTERNAL MEDICINE

## 2019-08-05 PROCEDURE — 36415 COLL VENOUS BLD VENIPUNCTURE: CPT | Performed by: INTERNAL MEDICINE

## 2019-08-05 PROCEDURE — 83550 IRON BINDING TEST: CPT | Performed by: INTERNAL MEDICINE

## 2019-08-05 PROCEDURE — 82784 ASSAY IGA/IGD/IGG/IGM EACH: CPT | Performed by: INTERNAL MEDICINE

## 2019-08-05 PROCEDURE — 81001 URINALYSIS AUTO W/SCOPE: CPT | Performed by: INTERNAL MEDICINE

## 2019-08-05 PROCEDURE — 83540 ASSAY OF IRON: CPT | Performed by: INTERNAL MEDICINE

## 2019-08-05 PROCEDURE — 85027 COMPLETE CBC AUTOMATED: CPT | Performed by: INTERNAL MEDICINE

## 2019-08-05 PROCEDURE — 80053 COMPREHEN METABOLIC PANEL: CPT | Performed by: INTERNAL MEDICINE

## 2019-08-05 PROCEDURE — 84100 ASSAY OF PHOSPHORUS: CPT | Performed by: INTERNAL MEDICINE

## 2019-08-05 PROCEDURE — 86335 IMMUNFIX E-PHORSIS/URINE/CSF: CPT | Performed by: INTERNAL MEDICINE

## 2019-08-05 PROCEDURE — 82728 ASSAY OF FERRITIN: CPT | Performed by: INTERNAL MEDICINE

## 2019-08-05 PROCEDURE — 83970 ASSAY OF PARATHORMONE: CPT | Performed by: INTERNAL MEDICINE

## 2019-08-05 PROCEDURE — 84156 ASSAY OF PROTEIN URINE: CPT | Performed by: INTERNAL MEDICINE

## 2019-08-05 PROCEDURE — 82043 UR ALBUMIN QUANTITATIVE: CPT | Performed by: INTERNAL MEDICINE

## 2019-08-05 PROCEDURE — 83735 ASSAY OF MAGNESIUM: CPT | Performed by: INTERNAL MEDICINE

## 2019-08-05 RX ORDER — LOSARTAN POTASSIUM 25 MG/1
25 TABLET ORAL DAILY
Qty: 30 TABLET | Refills: 11 | Status: SHIPPED | OUTPATIENT
Start: 2019-08-05 | End: 2019-08-07

## 2019-08-05 RX ORDER — FUROSEMIDE 20 MG
20 TABLET ORAL DAILY
Qty: 90 TABLET | Refills: 3 | Status: ON HOLD | OUTPATIENT
Start: 2019-08-05 | End: 2019-08-31

## 2019-08-05 RX ORDER — LEVOTHYROXINE SODIUM 175 UG/1
175 TABLET ORAL DAILY
Qty: 90 TABLET | Refills: 3 | Status: SHIPPED | OUTPATIENT
Start: 2019-08-05 | End: 2019-08-27

## 2019-08-05 ASSESSMENT — PAIN SCALES - GENERAL: PAINLEVEL: NO PAIN (0)

## 2019-08-05 NOTE — PROGRESS NOTES
Assessment and Plan:  69 year old female with history of primary biliary cirrhosis, s/p liver transplant in 2002, diabetes x 4 years, who is noted with proteinuria and thus referred.  She has history of atrial fibrillation, on eliquis, remote CVA, recurrent urine infections. She presents for evaluation of worsening proteinuria and lower extremity swelling. 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 She may have had kidney stone in the past.    # CKD:  Her Scr has been running 1.1-1.2 mg/dL which is very good. She has some proteinuria and we are aiming for good blood pressure control   - her proteinuria could be due to diabetes, sirolimus or other process (secondary FSGS etc)    - may consider ARB (cough with ACE documented)  - avoid dehydration, NSAIDs etc. Discussed  - consider imaging of kidneys given history of stone  # Hypertension: fairly well controlled. She is on lasix 10mg and metoprolol.     # Anemia in chronic renal disease:   - Hgb: Stable  - Iron studies: Not checked recently    # Electrolytes:   - Potassium; level: Low  - Bicarbonate; level: Normal    # Mineral Bone Disorder:   - Calcium; level is:  Normal   - PTH mildly elevated    Assessment and plan was discussed with patient and she voiced her understanding and agreement.    Consult:  Luz Thompson was seen in consultation at the request of Dr. Barraza for elevated creatinine.    Reason for Visit:  Luz Thompson is a 69 year old female with CKD from diabetes, transplant, who presents for evaluation.    HPI:  She is a pleasant female here for evaluation of renal function and proteinuria. She was transplanted in 2002 for primary biliary cirrhosis and has done quite well. She did have DERREK at the time and required dialysis transiently but since then has had good renal function with creatinine in low 1s range.   She has been on sirolimus and prednisone in the last ten years or so, off CNIs. She has  atrial fibrillation. She has had several urine infections and other infections. She has had DERREK in the past and some hypovolemia episodes. She also has had edema at times, though well controlled overall with low dose furosemide.  She has had a complex bone history and sees endocrinology. Her liver functions have been stable. She denies pain or dyspnea. She has stable weight and blood pressure and tries to eat heathy.   Her proteinuria is documented going back six + years but recently up to 1.2 grams/g creatinine per day.    Renal History:   Kidney Disease and Medical Hx:  h/o DM: Yes   Liver transplant   Previous AKIs    ROS:   A comprehensive review of systems was obtained and negative, except as noted in the HPI or PMH.    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     Prophylactic antibiotic     High risk medication use     Statin intolerance     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     Sensorineural hearing loss (SNHL) of both ears     Abnormal liver function tests     Liver replaced by transplant (H)     Type 2 diabetes, HbA1c goal < 7% (H)     Hyperlipidemia LDL goal <70     Hypertension goal BP (blood pressure) < 140/80     Postoperative hypothyroidism     Type 2 diabetes mellitus with stage 3 chronic kidney disease, without long-term current use of insulin (H)     Age-related osteoporosis without current pathological fracture     Tympanic membrane perforation, left     Other infective chronic otitis externa of left ear     CKD (chronic kidney disease) stage 3, GFR 30-59 ml/min (H)     PMH:   Medical record was reviewed and PMH was discussed with patient and noted below.  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      Diverticulosis of sigmoid colon 12/21/2013     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     Received Rituxan during Summer of 2016     H/O esophageal varices      Hearing loss      Heart murmur 4/4/2012     History of DVT (deep vein thrombosis)      History of [facility] fever      History of thyroid cancer 9/25/2012     Hyperlipidemia 4/10/2012    Says that she does not have it anymore, not on meds     Hyperlipidemia LDL goal <70      Hypertension goal BP (blood pressure) < 140/80 11/06/2013     Hypertriglyceridemia      Liver replaced by transplant (H) 10/17/2011    Dr. 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     PSH:   Past Surgical History:   Procedure Laterality Date     APPENDECTOMY  1961     BIOPSY       CATARACT IOL, RT/LT      RE12/19/2013, LE12/10/2013 - Toric lenses     CHOLECYSTECTOMY  1991     COLONOSCOPY  3/10/2014    Procedure: COLONOSCOPY;;  Surgeon: Gentry Ramirez MD;  Location: [facility] GI     ear drum repair       ENDOBRONCHIAL ULTRASOUND FLEXIBLE N/A 9/29/2017    Procedure: ENDOBRONCHIAL ULTRASOUND FLEXIBLE;  Flexible Bronchoscopy, Endobronchial Ultrasound, Transbronchial Needle Aspiration ;  Surgeon: Eden Clinton MD;  Location: [facility] OR     ENDOSCOPIC " "RETROGRADE CHOLANGIOPANCREATOGRAM  2013    Procedure: ENDOSCOPIC RETROGRADE CHOLANGIOPANCREATOGRAM;  Endoscopic Retrograde Cholangiopancreatogram with single balloon enteroscopy, ballon sweep of bile duct;  Surgeon: Brett Membreno MD;  Location: [facility] OR     HC KNEE SCOPE,MED/LAT MENISECTOMY  8/10/12    Right, partial medial menisectomy only     KNEE SURGERY  1966    R knee     PICC INSERTION  2013    4fr SL PASV PICC, 40cm (1cm external) in the R basilic vein w/ tip in the low SVC     PICC INSERTION  2014    5 fr DL BioFlo Navilyst PICC, 46 cm (3 cm external) in the L basilic vein w/ tip in the SVC RA junction.     THYROIDECTOMY  3/2010     TRANSPLANT LIVER RECIPIENT LIVING UNRELATED         Family Hx:   Family History   Problem Relation Age of Onset     Hypertension Mother      Endometrial Cancer Mother      Hyperlipidemia Mother      Prostate Cancer Father      Macular Degeneration Father      Cancer - colorectal Maternal Grandmother         in her 80's, has surgery and removal of part of kidney,  at age 98     Heart Disease Maternal Grandfather          at 98     Glaucoma Maternal Grandfather      Cerebrovascular Disease Paternal Grandmother         in her 80's     Hypertension Paternal Grandmother      Heart Disease Paternal Grandfather         MI     Alzheimer Disease Paternal Grandfather      Allergies Son      Neurologic Disorder Daughter         Migraines     Breast Cancer Other      Anesthesia Reaction No family hx of      Crohn's Disease No family hx of      Ulcerative Colitis No family hx of      Personal Hx:   Social History     Tobacco Use     Smoking status: Former Smoker     Packs/day: 1.00     Years: 18.00     Pack years: 18.00     Types: Cigarettes     Last attempt to quit: 1985     Years since quittin.3     Smokeless tobacco: Never Used   Substance Use Topics     Alcohol use: Yes     Alcohol/week: 0.0 oz     Comment: rare - \"I toast at weddings\" "       Allergies:  Allergies   Allergen Reactions     Fluconazole Hives and Itching     Ciprofloxacin Anxiety and Other (See Comments)     Irregular heart beat     Azithromycin Itching     Benadryl [Diphenhydramine Hcl]      Insomnia      Cellcept Diarrhea     Ciprofloxacin Other (See Comments)     Insomnia, mood lability     Codeine      Psych disturbance     Codeine      Diphenhydramine Other (See Comments)     Doxycycline      Lansoprazole Diarrhea     Levaquin [Levofloxacin] Other (See Comments)     Headache, hyperactivity     Lisinopril Cough     Methotrexate      Sores     Methotrexate      Morphine Sulfate Itching     Mycophenolate Diarrhea     No Clinical Screening - See Comments      Simvastatin Muscle Pain (Myalgia)     severe     Cephalexin Rash     Fever and skin burning     Penicillin G Itching and Rash     Tolectin [Nsaids] Rash     Tramadol Rash       Medications:  Current Outpatient Medications   Medication Sig     acetaminophen 500 MG CAPS Take 1,000 mg by mouth nightly as needed Take 500-1,000 mg by mouth every 6 hours if needed.      alirocumab (PRALUENT) 150 MG/ML injectable pen Inject 1 mL (150 mg) Subcutaneous every 14 days     blood glucose monitoring (NO BRAND SPECIFIED) meter device kit Use to test blood sugar 2times daily or as directed.     blood glucose monitoring (NO BRAND SPECIFIED) test strip Use to test blood sugar 2 times daily, before breakfast and before bedtime     calcitRIOL (ROCALTROL) 0.5 MCG capsule Take 1 capsule (0.5 mcg) by mouth daily     cefTAZidime (FORTAZ) 1 GM vial For Allergy Testing in Allergy Clinic Only     ciprofloxacin-dexamethasone (CIPRODEX) otic suspension Place 4 drops Into the left ear three times a week     clotrimazole (LOTRIMIN) 1 % cream Apply topically 2 times daily as needed Reported on 4/25/2017     ELIQUIS 2.5 MG tablet Take 1 tablet (2.5 mg) by mouth 2 times daily     estradiol (ESTRACE VAGINAL) 0.1 MG/GM cream Place 2 g vaginally twice a week      estradiol (VAGIFEM) 10 MCG TABS vaginal tablet Place 1 tablet (10 mcg) vaginally twice a week     ezetimibe (ZETIA) 10 MG tablet TAKE 1 TABLET BY MOUTH EVERY DAY OR AS DIRECTED BY DR WOLF     ferrous gluconate (FERGON) 324 (38 Fe) MG tablet Take 1 tablet (324 mg) by mouth 3 times daily (with meals)     fluconazole (DIFLUCAN) 200-0.9 MG/100ML-% intermittent infusion For allergy testing     folic acid (FOLVITE) 1 MG tablet Take 1 tablet (1 mg) by mouth daily     furosemide (LASIX) 20 MG tablet TAKE 1/2 TABLET BY MOUTH EVERY DAY     hydrALAZINE (APRESOLINE) 25 MG tablet Take 0.5 tablets (12.5 mg) by mouth 3 times daily as needed , if systolic blood pressure is more than 140 mmHg.     Hypromellose (ARTIFICIAL TEARS OP) Apply 1 drop to eye as needed     levothyroxine (SYNTHROID/LEVOTHROID) 175 MCG tablet Take 1 tablet (175 mcg) by mouth daily     meclizine (ANTIVERT) 25 MG tablet Take 1 tablet (25 mg) by mouth as needed     methenamine (HIPREX) 1 g tablet Take 1 tablet (1 g) by mouth At Bedtime     metoprolol succinate (TOPROL-XL) 50 MG 24 hr tablet Take 1 tablet (50 mg) by mouth daily     metroNIDAZOLE (METROCREAM) 0.75 % cream Apply topically 2 times daily     metroNIDAZOLE (METROGEL) 0.75 % external gel Apply topically 2 times daily Put on face     Multiple Vitamins-Minerals (PRESERVISION AREDS 2) CAPS Take 1 capsule by mouth 2 times daily     omega-3 acid ethyl esters (LOVAZA) 1 g capsule Take 1 capsule (1 g) by mouth 2 times daily     order for DME Equipment being ordered: right brace with thumb     posaconazole (NOXAFIL) 100 MG EC tablet Take 3 tablets (300 mg) by mouth every morning     predniSONE (DELTASONE) 20 MG tablet Take 1 tablet (20 mg) by mouth 2 times daily     predniSONE (DELTASONE) 5 MG tablet Take 1 tablet (5 mg) by mouth daily     RAPAMUNE (BRAND) 1 MG tablet Take 1 tablet (1 mg) by mouth every other day     sertraline (ZOLOFT) 100 MG tablet Take 1 tab (100mg) PO daily (Patient taking differently:  Take 50 mg by mouth daily )     SUMAtriptan (IMITREX) 50 MG tablet Take 1 tablet (50 mg) by mouth at onset of headache for migraine repeat after 2 hours if needed.     triamcinolone (KENALOG) 0.025 % cream Apply topically 2 times daily To eyelids     triamcinolone (KENALOG) 0.1 % cream Apply topically 2 times daily as needed for irritation     ursodiol (ACTIGALL) 250 MG tablet TAKE 1 TABLET BY MOUTH TWICE A DAY     voriconazole (VFEND) 200 MG tablet Take 1 tablet (200 mg) by mouth 2 times daily     Wheat Dextrin (BENEFIBER) POWD 2 teaspoons daily     ampicillin (OMNIPEN) 250 MG injection For allergy testing     azithromycin (ZITHROMAX) 500 MG vial For allergy testing     benzoyl peroxide 5 % external liquid Use daily as directed     doxycycline (VIBRAMYCIN) 100 mg vial to attach to  mL bag For allergy testing     nitroFURantoin, macrocrystal-monohydrate, (MACROBID) 100 MG capsule Take 1 capsule (100 mg) by mouth 2 times daily For one week at onset of UTI symptoms     Current Facility-Administered Medications   Medication     ceFAZolin (ANCEF) injection 500 mg     cefTRIAXone (ROCEPHIN) injection 250 mg     penicillin g potassium [id number] unit vial INTRADERMAL      Vitals:  /76 (BP Location: Left arm)   Pulse 86   Wt 85.3 kg (188 lb)   LMP 06/01/1988 (Approximate)   BMI 29.44 kg/m      Exam:  GENERAL APPEARANCE: alert and no distress  HENT: mouth without ulcers or lesions  LYMPHATICS: no cervical or supraclavicular nodes  RESP: lungs clear to auscultation - no rales, rhonchi or wheezes  CV: regular rhythm, normal rate, no rub, no murmur  EDEMA: no LE edema bilaterally  ABDOMEN: soft, nondistended, nontender, bowel sounds normal  MS: extremities normal - no gross deformities noted, no evidence of inflammation in joints, no muscle tenderness  SKIN: no rash    LABS:   CMP  Recent Labs   Lab Test 05/20/19  0957 12/28/18 10/16/18  0902 07/30/18  0913    140 138 140   POTASSIUM 3.8 3.9 3.8 3.3*    CHLORIDE 105 99 102 102   CO2 26 28 27 27   ANIONGAP 7 12 9 11   GLC 94 103* 102* 79   BUN 23 16  13.6 26 26   CR 1.14* 1.18 1.19* 1.11*   GFRESTIMATED 49* 47* 45* 49*   GFRESTBLACK 56* 54* 54* 59*   KEILY 8.9 8.9 8.9 8.7     Recent Labs   Lab Test 05/20/19  0957 12/28/18 10/16/18  0902 07/30/18  0913   BILITOTAL 0.2 0.2 0.2 0.2   ALKPHOS 197* 227* 238* 276*   ALT 36 19 23 37   AST 20 20 19 25     CBC  Recent Labs   Lab Test 05/20/19  0957 10/16/18  0902 07/30/18  0913 05/19/18  0629   HGB 11.9 10.3* 10.7* 10.3*   WBC 8.4 7.2 7.4 6.1   RBC 4.22 3.76* 3.84 3.77*   HCT 37.2 33.1* 33.8* 33.0*   MCV 88 88 88 88   MCH 28.2 27.4 27.9 27.3   MCHC 32.0 31.1* 31.7 31.2*   RDW 16.1* 17.2* 16.0* 16.2*    385 327 302     URINE STUDIES  Recent Labs   Lab Test 05/22/19  1212 10/16/18  0925 07/30/18  0852 05/02/18  1008 08/31/17  2100 08/22/17  0913   COLOR Yellow Yellow Yellow Yellow Light Yellow Yellow   APPEARANCE Cloudy Slightly Cloudy Clear Clear Slightly Cloudy Clear   URINEGLC Neg Negative Negative Neg Negative Negative   URINEBILI Neg Negative Negative Neg Negative Negative   URINEKETONE Neg Negative Negative Neg Negative Negative   SG 1.015 1.020 1.010 1.010 1.010 1.015   UBLD Trace Trace* Negative Neg Small* Negative   URINEPH 5.5 6.0 6.0 6.0 8.0* 7.0   PROTEIN 100  100* 30* 30  30* 30*   UROBILINOGEN 0.2 0.2 0.2 0.2  --  0.2   NITRITE Neg Negative Negative Neg Negative Negative   LEUKEST Small Small* Moderate* Trace Small* Trace*   RBCU  --  5-10* O - 2  --  3* O - 2   WBCU  --  0 - 5 5-10*  --  2 2-5*     Recent Labs   Lab Test 10/16/18  0924 02/11/14  0802 01/21/14  0903 09/05/13  0804   UTPG 1.22* 0.30* 0.64* 0.09     PTH  Recent Labs   Lab Test 05/24/16  0846 07/20/15  1010 01/21/14  0858   PTHI 60 89* 73*     IRON STUDIES  Recent Labs   Lab Test 07/30/18  0855 09/13/17  0919 07/11/13  0814  04/18/13  0809   IRON 39  --  58  --  63     --  285  --  315   IRONSAT 13*  --  20  --  20   LAURA 18 44 195   <  > 232    < > = values in this interval not displayed.         Nicolette Quan MD

## 2019-08-05 NOTE — LETTER
8/5/2019       RE: Luz Thompson  [address]  [place] [address]     Dear Colleague,    Thank you for referring your patient, Luz Thompson, to the [facility]. Please see a copy of my visit note below.    Assessment and Plan:  69 year old female with history of primary biliary cirrhosis, s/p liver transplant in 2002, diabetes x 4 years, who is noted with proteinuria and thus referred.  She has history of atrial fibrillation, on eliquis, remote CVA, recurrent urine infections. She presents for evaluation of worsening proteinuria and lower extremity swelling. 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 She may have had kidney stone in the past.    # CKD:  Her Scr has been running 1.1-1.2 mg/dL which is very good. She has some proteinuria and we are aiming for good blood pressure control   - her proteinuria could be due to diabetes, sirolimus or other process (secondary FSGS etc)    - may consider ARB (cough with ACE documented)  - avoid dehydration, NSAIDs etc. Discussed  - consider imaging of kidneys given history of stone  # Hypertension: fairly well controlled. She is on lasix 10mg and metoprolol.     # Anemia in chronic renal disease:   - Hgb: Stable  - Iron studies: Not checked recently    # Electrolytes:   - Potassium; level: Low  - Bicarbonate; level: Normal    # Mineral Bone Disorder:   - Calcium; level is:  Normal   - PTH mildly elevated    Assessment and plan was discussed with patient and she voiced her understanding and agreement.    Consult:  Luz Thompson was seen in consultation at the request of Dr. Barraza for elevated creatinine.    Reason for Visit:  Luz Thompson is a 69 year old female with CKD from diabetes, transplant, who presents for evaluation.    HPI:  She is a pleasant female here for evaluation of renal function and proteinuria. She was transplanted in  2002 for primary biliary cirrhosis and has done quite well. She did have DERREK at the time and required dialysis transiently but since then has had good renal function with creatinine in low 1s range.   She has been on sirolimus and prednisone in the last ten years or so, off CNIs. She has atrial fibrillation. She has had several urine infections and other infections. She has had DERREK in the past and some hypovolemia episodes. She also has had edema at times, though well controlled overall with low dose furosemide.  She has had a complex bone history and sees endocrinology. Her liver functions have been stable. She denies pain or dyspnea. She has stable weight and blood pressure and tries to eat heathy.   Her proteinuria is documented going back six + years but recently up to 1.2 grams/g creatinine per day.    Renal History:   Kidney Disease and Medical Hx:  h/o DM: Yes   Liver transplant   Previous AKIs    ROS:   A comprehensive review of systems was obtained and negative, except as noted in the HPI or PMH.    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     Prophylactic antibiotic     High risk medication use     Statin intolerance     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     Sensorineural hearing loss (SNHL) of both ears     Abnormal liver function tests     Liver replaced by transplant (H)     Type 2 diabetes, HbA1c goal < 7% (H)     Hyperlipidemia LDL goal <70     Hypertension goal BP (blood pressure) < 140/80     Postoperative hypothyroidism     Type 2 diabetes mellitus with stage 3 chronic kidney disease, without long-term current use of insulin (H)     Age-related osteoporosis without current pathological fracture     Tympanic membrane perforation, left     Other infective chronic otitis externa  "of left ear     CKD (chronic kidney disease) stage 3, GFR 30-59 ml/min (H)     PMH:   Medical record was reviewed and PMH was discussed with patient and noted below.  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      Diverticulosis of sigmoid colon 12/21/2013     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     Received Rituxan during Summer of 2016     H/O esophageal varices      Hearing loss      Heart murmur 4/4/2012     History of DVT (deep vein thrombosis)      History of [facility] fever      History of thyroid cancer 9/25/2012     Hyperlipidemia 4/10/2012    Says that she does not have it anymore, not on meds     Hyperlipidemia LDL goal <70      Hypertension goal BP (blood pressure) < 140/80 11/06/2013     Hypertriglyceridemia      Liver replaced by transplant (H) 10/17/2011    Dr. 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     PSH:   Past Surgical History:   Procedure Laterality Date     APPENDECTOMY  1961     BIOPSY       CATARACT IOL, RT/LT      RE12/19/2013, LE12/10/2013 - Toric lenses     CHOLECYSTECTOMY  1991     COLONOSCOPY  3/10/2014    Procedure: COLONOSCOPY;;  Surgeon: Gentry Ramirez MD;  " Location: [facility]     ear drum repair       ENDOBRONCHIAL ULTRASOUND FLEXIBLE N/A 2017    Procedure: ENDOBRONCHIAL ULTRASOUND FLEXIBLE;  Flexible Bronchoscopy, Endobronchial Ultrasound, Transbronchial Needle Aspiration ;  Surgeon: Eden Clinton MD;  Location: [facility]     ENDOSCOPIC RETROGRADE CHOLANGIOPANCREATOGRAM  2013    Procedure: ENDOSCOPIC RETROGRADE CHOLANGIOPANCREATOGRAM;  Endoscopic Retrograde Cholangiopancreatogram with single balloon enteroscopy, ballon sweep of bile duct;  Surgeon: Brett Membreno MD;  Location: [facility] OR     HC KNEE SCOPE,MED/LAT MENISECTOMY  8/10/12    Right, partial medial menisectomy only     KNEE SURGERY  1966    R knee     PICC INSERTION  2013    4fr SL PASV PICC, 40cm (1cm external) in the R basilic vein w/ tip in the low SVC     PICC INSERTION  2014    5 fr DL BioFlo Navilyst PICC, 46 cm (3 cm external) in the L basilic vein w/ tip in the SVC RA junction.     THYROIDECTOMY  3/2010     TRANSPLANT LIVER RECIPIENT LIVING UNRELATED         Family Hx:   Family History   Problem Relation Age of Onset     Hypertension Mother      Endometrial Cancer Mother      Hyperlipidemia Mother      Prostate Cancer Father      Macular Degeneration Father      Cancer - colorectal Maternal Grandmother         in her 80's, has surgery and removal of part of kidney,  at age 98     Heart Disease Maternal Grandfather          at 98     Glaucoma Maternal Grandfather      Cerebrovascular Disease Paternal Grandmother         in her 80's     Hypertension Paternal Grandmother      Heart Disease Paternal Grandfather         MI     Alzheimer Disease Paternal Grandfather      Allergies Son      Neurologic Disorder Daughter         Migraines     Breast Cancer Other      Anesthesia Reaction No family hx of      Crohn's Disease No family hx of      Ulcerative Colitis No family hx of      Personal Hx:   Social History     Tobacco Use     Smoking status: Former Smoker     Packs/day:  "1.00     Years: 18.00     Pack years: 18.00     Types: Cigarettes     Last attempt to quit: 1985     Years since quittin.3     Smokeless tobacco: Never Used   Substance Use Topics     Alcohol use: Yes     Alcohol/week: 0.0 oz     Comment: rare - \"I toast at weddings\"       Allergies:  Allergies   Allergen Reactions     Fluconazole Hives and Itching     Ciprofloxacin Anxiety and Other (See Comments)     Irregular heart beat     Azithromycin Itching     Benadryl [Diphenhydramine Hcl]      Insomnia      Cellcept Diarrhea     Ciprofloxacin Other (See Comments)     Insomnia, mood lability     Codeine      Psych disturbance     Codeine      Diphenhydramine Other (See Comments)     Doxycycline      Lansoprazole Diarrhea     Levaquin [Levofloxacin] Other (See Comments)     Headache, hyperactivity     Lisinopril Cough     Methotrexate      Sores     Methotrexate      Morphine Sulfate Itching     Mycophenolate Diarrhea     No Clinical Screening - See Comments      Simvastatin Muscle Pain (Myalgia)     severe     Cephalexin Rash     Fever and skin burning     Penicillin G Itching and Rash     Tolectin [Nsaids] Rash     Tramadol Rash       Medications:  Current Outpatient Medications   Medication Sig     acetaminophen 500 MG CAPS Take 1,000 mg by mouth nightly as needed Take 500-1,000 mg by mouth every 6 hours if needed.      alirocumab (PRALUENT) 150 MG/ML injectable pen Inject 1 mL (150 mg) Subcutaneous every 14 days     blood glucose monitoring (NO BRAND SPECIFIED) meter device kit Use to test blood sugar 2times daily or as directed.     blood glucose monitoring (NO BRAND SPECIFIED) test strip Use to test blood sugar 2 times daily, before breakfast and before bedtime     calcitRIOL (ROCALTROL) 0.5 MCG capsule Take 1 capsule (0.5 mcg) by mouth daily     cefTAZidime (FORTAZ) 1 GM vial For Allergy Testing in Allergy Clinic Only     ciprofloxacin-dexamethasone (CIPRODEX) otic suspension Place 4 drops Into the left ear " three times a week     clotrimazole (LOTRIMIN) 1 % cream Apply topically 2 times daily as needed Reported on 4/25/2017     ELIQUIS 2.5 MG tablet Take 1 tablet (2.5 mg) by mouth 2 times daily     estradiol (ESTRACE VAGINAL) 0.1 MG/GM cream Place 2 g vaginally twice a week     estradiol (VAGIFEM) 10 MCG TABS vaginal tablet Place 1 tablet (10 mcg) vaginally twice a week     ezetimibe (ZETIA) 10 MG tablet TAKE 1 TABLET BY MOUTH EVERY DAY OR AS DIRECTED BY DR WOLF     ferrous gluconate (FERGON) 324 (38 Fe) MG tablet Take 1 tablet (324 mg) by mouth 3 times daily (with meals)     fluconazole (DIFLUCAN) 200-0.9 MG/100ML-% intermittent infusion For allergy testing     folic acid (FOLVITE) 1 MG tablet Take 1 tablet (1 mg) by mouth daily     furosemide (LASIX) 20 MG tablet TAKE 1/2 TABLET BY MOUTH EVERY DAY     hydrALAZINE (APRESOLINE) 25 MG tablet Take 0.5 tablets (12.5 mg) by mouth 3 times daily as needed , if systolic blood pressure is more than 140 mmHg.     Hypromellose (ARTIFICIAL TEARS OP) Apply 1 drop to eye as needed     levothyroxine (SYNTHROID/LEVOTHROID) 175 MCG tablet Take 1 tablet (175 mcg) by mouth daily     meclizine (ANTIVERT) 25 MG tablet Take 1 tablet (25 mg) by mouth as needed     methenamine (HIPREX) 1 g tablet Take 1 tablet (1 g) by mouth At Bedtime     metoprolol succinate (TOPROL-XL) 50 MG 24 hr tablet Take 1 tablet (50 mg) by mouth daily     metroNIDAZOLE (METROCREAM) 0.75 % cream Apply topically 2 times daily     metroNIDAZOLE (METROGEL) 0.75 % external gel Apply topically 2 times daily Put on face     Multiple Vitamins-Minerals (PRESERVISION AREDS 2) CAPS Take 1 capsule by mouth 2 times daily     omega-3 acid ethyl esters (LOVAZA) 1 g capsule Take 1 capsule (1 g) by mouth 2 times daily     order for DME Equipment being ordered: right brace with thumb     posaconazole (NOXAFIL) 100 MG EC tablet Take 3 tablets (300 mg) by mouth every morning     predniSONE (DELTASONE) 20 MG tablet Take 1 tablet (20  mg) by mouth 2 times daily     predniSONE (DELTASONE) 5 MG tablet Take 1 tablet (5 mg) by mouth daily     RAPAMUNE (BRAND) 1 MG tablet Take 1 tablet (1 mg) by mouth every other day     sertraline (ZOLOFT) 100 MG tablet Take 1 tab (100mg) PO daily (Patient taking differently: Take 50 mg by mouth daily )     SUMAtriptan (IMITREX) 50 MG tablet Take 1 tablet (50 mg) by mouth at onset of headache for migraine repeat after 2 hours if needed.     triamcinolone (KENALOG) 0.025 % cream Apply topically 2 times daily To eyelids     triamcinolone (KENALOG) 0.1 % cream Apply topically 2 times daily as needed for irritation     ursodiol (ACTIGALL) 250 MG tablet TAKE 1 TABLET BY MOUTH TWICE A DAY     voriconazole (VFEND) 200 MG tablet Take 1 tablet (200 mg) by mouth 2 times daily     Wheat Dextrin (BENEFIBER) POWD 2 teaspoons daily     ampicillin (OMNIPEN) 250 MG injection For allergy testing     azithromycin (ZITHROMAX) 500 MG vial For allergy testing     benzoyl peroxide 5 % external liquid Use daily as directed     doxycycline (VIBRAMYCIN) 100 mg vial to attach to  mL bag For allergy testing     nitroFURantoin, macrocrystal-monohydrate, (MACROBID) 100 MG capsule Take 1 capsule (100 mg) by mouth 2 times daily For one week at onset of UTI symptoms     Current Facility-Administered Medications   Medication     ceFAZolin (ANCEF) injection 500 mg     cefTRIAXone (ROCEPHIN) injection 250 mg     penicillin g potassium [id number] unit vial INTRADERMAL      Vitals:  /76 (BP Location: Left arm)   Pulse 86   Wt 85.3 kg (188 lb)   LMP 06/01/1988 (Approximate)   BMI 29.44 kg/m       Exam:  GENERAL APPEARANCE: alert and no distress  HENT: mouth without ulcers or lesions  LYMPHATICS: no cervical or supraclavicular nodes  RESP: lungs clear to auscultation - no rales, rhonchi or wheezes  CV: regular rhythm, normal rate, no rub, no murmur  EDEMA: no LE edema bilaterally  ABDOMEN: soft, nondistended, nontender, bowel sounds  normal  MS: extremities normal - no gross deformities noted, no evidence of inflammation in joints, no muscle tenderness  SKIN: no rash    LABS:   CMP  Recent Labs   Lab Test 05/20/19  0957 12/28/18 10/16/18  0902 07/30/18  0913    140 138 140   POTASSIUM 3.8 3.9 3.8 3.3*   CHLORIDE 105 99 102 102   CO2 26 28 27 27   ANIONGAP 7 12 9 11   GLC 94 103* 102* 79   BUN 23 16  13.6 26 26   CR 1.14* 1.18 1.19* 1.11*   GFRESTIMATED 49* 47* 45* 49*   GFRESTBLACK 56* 54* 54* 59*   KEILY 8.9 8.9 8.9 8.7     Recent Labs   Lab Test 05/20/19  0957 12/28/18 10/16/18  0902 07/30/18  0913   BILITOTAL 0.2 0.2 0.2 0.2   ALKPHOS 197* 227* 238* 276*   ALT 36 19 23 37   AST 20 20 19 25     CBC  Recent Labs   Lab Test 05/20/19  0957 10/16/18  0902 07/30/18  0913 05/19/18  0629   HGB 11.9 10.3* 10.7* 10.3*   WBC 8.4 7.2 7.4 6.1   RBC 4.22 3.76* 3.84 3.77*   HCT 37.2 33.1* 33.8* 33.0*   MCV 88 88 88 88   MCH 28.2 27.4 27.9 27.3   MCHC 32.0 31.1* 31.7 31.2*   RDW 16.1* 17.2* 16.0* 16.2*    385 327 302     URINE STUDIES  Recent Labs   Lab Test 05/22/19  1212 10/16/18  0925 07/30/18  0852 05/02/18  1008 08/31/17  2100 08/22/17  0913   COLOR Yellow Yellow Yellow Yellow Light Yellow Yellow   APPEARANCE Cloudy Slightly Cloudy Clear Clear Slightly Cloudy Clear   URINEGLC Neg Negative Negative Neg Negative Negative   URINEBILI Neg Negative Negative Neg Negative Negative   URINEKETONE Neg Negative Negative Neg Negative Negative   SG 1.015 1.020 1.010 1.010 1.010 1.015   UBLD Trace Trace* Negative Neg Small* Negative   URINEPH 5.5 6.0 6.0 6.0 8.0* 7.0   PROTEIN 100  100* 30* 30  30* 30*   UROBILINOGEN 0.2 0.2 0.2 0.2  --  0.2   NITRITE Neg Negative Negative Neg Negative Negative   LEUKEST Small Small* Moderate* Trace Small* Trace*   RBCU  --  5-10* O - 2  --  3* O - 2   WBCU  --  0 - 5 5-10*  --  2 2-5*     Recent Labs   Lab Test 10/16/18  0924 02/11/14  0802 01/21/14  0903 09/05/13  0804   UTPG 1.22* 0.30* 0.64* 0.09     PTH  Recent Labs    Lab Test 05/24/16  0846 07/20/15  1010 01/21/14  0858   PTHI 60 89* 73*     IRON STUDIES  Recent Labs   Lab Test 07/30/18  0855 09/13/17  0919 07/11/13  0814  04/18/13  0809   IRON 39  --  58  --  63     --  285  --  315   IRONSAT 13*  --  20  --  20   LAURA 18 44 195   < > 232    < > = values in this interval not displayed.         Nicolette Quan MD    Again, thank you for allowing me to participate in the care of your patient.      Sincerely,    Nicolette Quan MD

## 2019-08-05 NOTE — LETTER
8/5/2019       RE: Luz Thompson  [address]  [place] [address]     Dear Colleague,    Thank you for referring your patient, Luz Thompson, to the [facility] at [facility]. Please see a copy of my visit note below.    The patient returns to the clinic for follow-up of papillary thyroid cancer, osteoporosis, diabetes.     Ms Thompson is a 69 year old woman s/p 5/22/02 orthotopic liver transplant for primary biliary cirrhosis, on immunosuppression treatment (rapamune, prednisone).     She reports going through significant stress over the last year.  Voriconazole was changed with a different antifungal medication.    1. Papillary thyroid cancer, S/P total thyroidectomy and central neck dissection on March 2nd, 2010. Follicular variant, solitary, 3 cm in greatest diameter, with no capsular invasion and negative central neck lymph nodes (0/18 lymph nodes).  MACIS score 5.78.     The WBS done on 12/9/2010 revealed 3 vaque foci of uptake in the L neck. Total iodine uptake was 0.2%.   She underwent radioablation with 159.2 mCi I 131, on 1/27/2010. The WBS done 1 week post treatment revealed 3 foci of uptake in the L neck, which were previously seen on the pretreatment scan.     11/5/12 neck US - normal appearing lymph nodes levels 2 B/L and 3 LN at left level 3   6/7/12 posttreatment WBS - negative   6/25/13 neck US - didn't identify lymph nodes with features suspicious of malignancy. A right level II lymph node appeared to be mildly larger compared with prior images from 2010. Two left level III lymph nodes were again noted, of normal size.   5/19/14 and 6/14/2016 neck USs  - no definite suspicious looking lymph nodes    Thyroglobulin antibodies were negative. I reviewed prior tyroglobulin levels:  5/5/10         0.21          TSH 7.48   10/25/10     <0.1          TSH 2.92  12/08/10      8.3             12/10/10      3.1           TSH 58.6    8/4/11         <0.1          TSH 0.03  6/8/12         <0.1          TSH 75.1  6/25/13       <0.1          TSH 12.8  3/5/14         <0.1          TSH 6.32   4/15/15       <0.1          TSH 2.4  5/24/16       <0.1          TSH 3.65   5/10/17       <0.1          TSH 4.74   7/13/17       <0.1          TSH 3.66     She continues to take 150  g levothyroxine daily 6 days a week and 1 1/2 tablets one day a week.  She takes the levothyroxine 30 minutes before breakfast and she does not have soy products with breakfast.    2. Hypocalcemia - requiring treatment with 0.5 mcg calcitriol daily, in the context of CKD with GFR in the 40s.     Most recent calcium was 8.9, for an albumin level of 3.2. Last PTH level was normal at 60, in 2016.    In a regular day, she drinks 2-3 glasses of milk and she takes an eye multivitamin.  She denies taking vitamin D or calcium supplements.    3.  Osteoporosis, not previously treated with antiresorptive drugs, due to impaired GFR.  Most recent DEXA scan from 2015 revealed a significant improvement of bone mineral density at the lumbar spine and mean hip (mainly right femoral neck).  The most negative T score was -2.8, at the right femoral neck.  In September 2018, she fell but she did not fracture her bones.  Since her last visit here, she reports 2 falls.  She has not been walking or exercising much.  She lives in a basement and she has to climb the stairs, which she considers the most important exercise for her.  When she walks, she has to pay attention as her knee easily locks.      4. Type 2 diabetes, diagnosed in May 2018, when A1c was 6.8%.  It was 7.5% today.    She denies blurred vision. She has galucoma and sees olpthamology regularly.   She has been experiencing numbness and tingling sensation in her ptosis is 2018.  The numbness is more pronounced in the right leg and she reports seeing a chiropractor for spine issues.  Urine microalbumin has been positive since 2014.  She is  scheduled to see nephrology for proteinuria.  Recently, prednisone was prescribed at 40 mg daily, for 5 days, she still has 1 day left of treatment.  With the exception of the last few days, when fasting blood sugar was in the 200s, her prior blood glucose numbers, checked in the morning, where well controlled, around 80.    In general, dinner is around 7-9 PM and she denies bedtime snacking.  She goes to bed anywhere between midnight to 2 AM.    5.  Mixed dyslipidemia  She follows up with cardiology.  She was not able to tolerate statins due to joint pain.  Currently, she is medicated with Zetia and alirocumab.        Current Outpatient Medications:      acetaminophen 500 MG CAPS, Take 1,000 mg by mouth nightly as needed Take 500-1,000 mg by mouth every 6 hours if needed. , Disp: , Rfl:      alirocumab (PRALUENT) 150 MG/ML injectable pen, Inject 1 mL (150 mg) Subcutaneous every 14 days, Disp: 6 mL, Rfl: 3     ampicillin (OMNIPEN) 250 MG injection, For allergy testing, Disp: 1 each, Rfl: 0     azithromycin (ZITHROMAX) 500 MG vial, For allergy testing, Disp: 1 each, Rfl: 0     benzoyl peroxide 5 % external liquid, Use daily as directed, Disp: 148 g, Rfl: 1     blood glucose monitoring (NO BRAND SPECIFIED) meter device kit, Use to test blood sugar 2times daily or as directed., Disp: 1 kit, Rfl: 0     blood glucose monitoring (NO BRAND SPECIFIED) test strip, Use to test blood sugar 2 times daily, before breakfast and before bedtime, Disp: 200 each, Rfl: 3     calcitRIOL (ROCALTROL) 0.5 MCG capsule, Take 1 capsule (0.5 mcg) by mouth daily, Disp: 90 capsule, Rfl: 3     cefTAZidime (FORTAZ) 1 GM vial, For Allergy Testing in Allergy Clinic Only, Disp: 1 each, Rfl: 0     ciprofloxacin-dexamethasone (CIPRODEX) otic suspension, Place 4 drops Into the left ear three times a week, Disp: 5.6 mL, Rfl: 6     clotrimazole (LOTRIMIN) 1 % cream, Apply topically 2 times daily as needed Reported on 4/25/2017, Disp: , Rfl:       doxycycline (VIBRAMYCIN) 100 mg vial to attach to  mL bag, For allergy testing, Disp: 10 mL, Rfl: 0     ELIQUIS 2.5 MG tablet, Take 1 tablet (2.5 mg) by mouth 2 times daily, Disp: 180 tablet, Rfl: 3     estradiol (ESTRACE VAGINAL) 0.1 MG/GM cream, Place 2 g vaginally twice a week, Disp: 42.5 g, Rfl: 11     estradiol (VAGIFEM) 10 MCG TABS vaginal tablet, Place 1 tablet (10 mcg) vaginally twice a week, Disp: 8 tablet, Rfl: 11     ezetimibe (ZETIA) 10 MG tablet, TAKE 1 TABLET BY MOUTH EVERY DAY OR AS DIRECTED BY DR WOLF, Disp: 90 tablet, Rfl: 3     ferrous gluconate (FERGON) 324 (38 Fe) MG tablet, Take 1 tablet (324 mg) by mouth 3 times daily (with meals), Disp: 90 tablet, Rfl: 0     fluconazole (DIFLUCAN) 200-0.9 MG/100ML-% intermittent infusion, For allergy testing, Disp: 100 mL, Rfl: 0     folic acid (FOLVITE) 1 MG tablet, Take 1 tablet (1 mg) by mouth daily, Disp: 100 tablet, Rfl: 3     furosemide (LASIX) 20 MG tablet, TAKE 1/2 TABLET BY MOUTH EVERY DAY, Disp: 45 tablet, Rfl: 3     hydrALAZINE (APRESOLINE) 25 MG tablet, Take 0.5 tablets (12.5 mg) by mouth 3 times daily as needed , if systolic blood pressure is more than 140 mmHg., Disp: 270 tablet, Rfl: 3     Hypromellose (ARTIFICIAL TEARS OP), Apply 1 drop to eye as needed, Disp: , Rfl:      levothyroxine (SYNTHROID/LEVOTHROID) 150 MCG tablet, TAKE 1.5 TABLETS BY MOUTH ON MONDAYS. AND 1 TABLET DAILY, THE OTHER DAYS OF THE WEEK., Disp: 102 tablet, Rfl: 3     meclizine (ANTIVERT) 25 MG tablet, Take 1 tablet (25 mg) by mouth as needed, Disp: 30 tablet, Rfl: 11     methenamine (HIPREX) 1 g tablet, Take 1 tablet (1 g) by mouth At Bedtime, Disp: 30 tablet, Rfl: 11     metoprolol succinate (TOPROL-XL) 50 MG 24 hr tablet, Take 1 tablet (50 mg) by mouth daily, Disp: 90 tablet, Rfl: 3     metroNIDAZOLE (METROCREAM) 0.75 % cream, Apply topically 2 times daily, Disp: 45 g, Rfl: 5     metroNIDAZOLE (METROGEL) 0.75 % external gel, Apply topically 2 times daily Put on  face, Disp: 45 g, Rfl: 3     Multiple Vitamins-Minerals (PRESERVISION AREDS 2) CAPS, Take 1 capsule by mouth 2 times daily, Disp: , Rfl:      nitroFURantoin, macrocrystal-monohydrate, (MACROBID) 100 MG capsule, Take 1 capsule (100 mg) by mouth 2 times daily For one week at onset of UTI symptoms, Disp: 14 capsule, Rfl: 6     omega-3 acid ethyl esters (LOVAZA) 1 g capsule, Take 1 capsule (1 g) by mouth 2 times daily, Disp: 360 capsule, Rfl: 3     order for DME, Equipment being ordered: right brace with thumb, Disp: 1 Device, Rfl: 0     posaconazole (NOXAFIL) 100 MG EC tablet, Take 3 tablets (300 mg) by mouth every morning, Disp: 90 tablet, Rfl: 11     predniSONE (DELTASONE) 20 MG tablet, Take 1 tablet (20 mg) by mouth 2 times daily, Disp: 10 tablet, Rfl: 0     predniSONE (DELTASONE) 5 MG tablet, Take 1 tablet (5 mg) by mouth daily, Disp: 90 tablet, Rfl: 3     RAPAMUNE (BRAND) 1 MG tablet, Take 1 tablet (1 mg) by mouth every other day, Disp: 45 tablet, Rfl: 3     sertraline (ZOLOFT) 100 MG tablet, Take 1 tab (100mg) PO daily (Patient taking differently: Take 50 mg by mouth daily ), Disp: 90 tablet, Rfl: 0     SUMAtriptan (IMITREX) 50 MG tablet, Take 1 tablet (50 mg) by mouth at onset of headache for migraine repeat after 2 hours if needed., Disp: 30 tablet, Rfl: 3     triamcinolone (KENALOG) 0.025 % cream, Apply topically 2 times daily To eyelids, Disp: 15 g, Rfl: 1     triamcinolone (KENALOG) 0.1 % cream, Apply topically 2 times daily as needed for irritation, Disp: , Rfl:      ursodiol (ACTIGALL) 250 MG tablet, TAKE 1 TABLET BY MOUTH TWICE A DAY, Disp: 180 tablet, Rfl: 3     voriconazole (VFEND) 200 MG tablet, Take 1 tablet (200 mg) by mouth 2 times daily, Disp: 180 tablet, Rfl: 0     Wheat Dextrin (BENEFIBER) POWD, 2 teaspoons daily, Disp: , Rfl:     Current Facility-Administered Medications:      ceFAZolin (ANCEF) injection 500 mg, 500 mg, Intramuscular, Once, Will Rai MD     cefTRIAXone (ROCEPHIN)  injection 250 mg, 250 mg, Intramuscular, Once, Will Rai MD     penicillin g potassium [id number] unit vial INTRADERMAL, 5,000,000 Units, Intradermal, Once, Will Rai MD    PAST MEDICAL HISTORY:   Primary biliary cirrhosis s/p transplant - may 2002.   Anemia of chronic disease.   Hypothyroidism for 30 years treated with levothyroxine   Post menopausal.   CVA in , symptoms have resolved.   VRE in the stool.   Sjogren syndrome, diagnosed in the .   Migraines.   UTIs.   Superficial phlebitis.   History of esophageal varices.   Varicose veins.   Heart murmur.  On dyalisis while in coma for hepatic encephalopathy for 18 days  CKD with GFR in the 40s    Osteoporosis  Sepsis of unclear etiology  and    Fatty pancreas on CT  Diverticulosis   Kidney stone -   Emphysema   Atrial fibrillation   Lung nodules -considered benign on prior imaging    PAST SURGICAL HISTORY:   Orthotopic liver transplant in .   Sclerotherapy for varicose veins in 2008.   Vein stripping for varicose veins in .   Laparoscopic cholecystectomy in .   Appendectomy in .   A benign tumor removal of her right knee in .   L Tympanoplasty in .  Cataract surgery      FH:  Father  of prostate cancer. Mother has HTN, GERD. Both paternal grandmother and paternal grandfather had strokes later in life. Maternal grandmother had colon surgery (?cancer) and myocardial infarct at age 97.     SH.   . She has 2 children and 2 stepchildren. Occupation: retired medical social worker. She denies smoking, drinking alcohol or using illicit drugs.    Review of Systems   Systemic:             Sleeps 6 hrs a night ' sleeps good, no significant fatigue; weight up 13 lbs since 2018  Fatigue - longstanding and improved with the antidepressant, weight stable   Eye:                      No eye symptoms   Aracelis-Laryngeal:     no dysphagia, no hoarseness, no cough  Breast:                  No breast  symptoms  Cardiovascular:    No cardiovascular symptoms, no CP or palpitations   Pulmonary:           SOB with exertion   Gastrointestinal:   No N/V, loose BMs noted recently, which she attributes to the recent antifungal treatment  Genitourinary:       No genitourinary symptoms, no increased thirst or urination; urinates 0-1 times a night    Endocrine:            cold intolerance with no changes over the years, swollen lower extremities  Neurological:        rare headaches, no tremor, no dizziness   Musculoskeletal:   Right thumb and knees joint pain     Skin:                     Swollen feet - wears compression sockings, dry skin, no hair loss   Psychological:     No psychological symptoms    Wt Readings from Last 10 Encounters:   08/05/19 85.3 kg (188 lb)   07/31/19 83 kg (183 lb)   07/09/19 83 kg (183 lb)   05/22/19 81.2 kg (179 lb)   05/21/19 80.7 kg (177 lb 14.6 oz)   05/16/19 80.7 kg (178 lb)   10/25/18 81.6 kg (179 lb 12.8 oz)   10/08/18 79.4 kg (175 lb)   09/21/18 78.7 kg (173 lb 6.4 oz)   09/12/18 78.6 kg (173 lb 5 oz)     /78   Pulse 71   Wt 85.3 kg (188 lb)   LMP 06/01/1988 (Approximate)   BMI 29.44 kg/m       General appearance: well-nourished, no distress noted, pale.  Eyes: conjunctivae and extraocular motions are normal. Pupils are equal, round, and reactive to light. No lid lag, no stare.  HENT: oropharynx clear and moist; neck no JVD, no bruits, no palpable thyroid masses   Cardiovascular: regular rhythm, + systolic murmur, distal pulses palpable, very mild lower extremities edema  Respiratory: chest clear, no rales, no rhonchi  Gastrointestinal: abdomen soft, nontender, nondistended, +BS, no organomegaly  Musculoskeletal: normal tone and strength, no lower extremity edema  Neurologic: no resting tremor, knee reflexes - unable to elicit  Psychiatric: affect and judgment normal   Skin: warm, vitiligo, lower extremities stasis dermatitis     Feet:  impaired sensation to monofilament testing  - tip of the toes and ball of the feet - bilaterally    Labs:   I reviewed prior lab results documented in EPIC.   Lab Results   Component Value Date    A1C 6.4 10/16/2018    A1C 6.8 05/18/2018       TSH   Date Value Ref Range Status   05/20/2019 12.11 (H) 0.40 - 4.00 mU/L Final     T4 Total   Date Value Ref Range Status   09/23/2014 9.1 5.0 - 11.0 ug/dL Final     T4 Free   Date Value Ref Range Status   05/20/2019 0.99 0.76 - 1.46 ng/dL Final      Ref. Range 5/24/2016 08:46   Calcium Latest Ref Range: 8.5 - 10.1 mg/dL 8.8   GFR Estimate Latest Ref Range: >60 mL/min/1.7m2 29 (L)   Phosphorus Latest Ref Range: 2.5 - 4.5 mg/dL 3.2   Albumin Latest Ref Range: 3.4 - 5.0 g/dL 3.3 (L)   Parathyroid Hormone Intact Latest Ref Range: 12 - 72 pg/mL 60      Ref. Range 5/10/2017 09:29   Calcium Latest Ref Range: 8.5 - 10.1 mg/dL 9.7   GFR Estimate Latest Ref Range: >60 mL/min/1.7m2 34 (L)   Phosphorus Latest Ref Range: 2.5 - 4.5 mg/dL 3.1   Albumin Latest Ref Range: 3.4 - 5.0 g/dL 3.7     Assessment/Plan:    1. Type 2 diabetes, with worsening glycemic control since her last visit here. Is complicated by diabetic neuropathy.  Recent treatment with prednisone might contribute.  Recommendations:  Continue to check blood sugar once daily, fasting and at bedtime  Follow-up with nephrology regarding proteinuria  Have them meter downloaded in our clinic as needed.  If the glycemic control deteriorates, we are going to consider starting Tradjenta.    2. Postsurgical hypothyroidism   On the most recent lab work, the TSH was minimally elevated, while free T4 was normal.  I recommended to increase the dose of levothyroxine to 175 mcg daily and have the thyroid hormone levels rechecked in 2 months.    3. Hypocalcemia  Calcitriol was initially started in 2010 for management of postsurgical hypocalcemia, presumed to be due to hypoparathyroidism. The PTH level increased after surgery, but the hypocalcemia persisted, requiring tx with  calcitriol despite normal 25 OH vitamin D levels. In 2012, it was aggravated by malabsorption, hypomagnesemia. It is now well controlled with calcitriol.  The daily intake of calcium appears to be appropriate, based on the amount of dairy products.  Follow-up lab work.    4. Papillary thyroid cancer s/p total thyroidectomy, central neck dissection and radioablation. Post treatment scan in 6/12 was negative for recurrence. Thyrogen stimulated Tg level was undetectable in June 2012. The nonstimulated thyroglobulin remained undetectable, with most recent value from 2017. On the most recent neck ultrasound from 2016, there were no suspicious looking lymph nodes.   I did not recommend further monitoring.    5.  Osteoporosis.  Schedule a follow-up DEXA scan, including 33% distal radius.  Follow-up calcium, albumin, phosphorus, PTH.    Orders Placed This Encounter   Procedures     DX Wrist Heel Radius     DX Hip/Pelvis/Spine     Hemoglobin A1c     TSH     T4 free     T3 total     Calcium     Albumin level     Phosphorus     Parathyroid Hormone Intact     Hemoglobin A1c POCT   Again, thank you for allowing me to participate in the care of your patient.      Sincerely,  Rach Vasquez MD

## 2019-08-05 NOTE — PROGRESS NOTES
The patient returns to the clinic for follow-up of papillary thyroid cancer, osteoporosis, diabetes.     Ms Thompson is a 69 year old woman s/p 5/22/02 orthotopic liver transplant for primary biliary cirrhosis, on immunosuppression treatment (rapamune, prednisone).     She reports going through significant stress over the last year.  Voriconazole was changed with a different antifungal medication.    1. Papillary thyroid cancer, S/P total thyroidectomy and central neck dissection on March 2nd, 2010. Follicular variant, solitary, 3 cm in greatest diameter, with no capsular invasion and negative central neck lymph nodes (0/18 lymph nodes).  MACIS score 5.78.     The WBS done on 12/9/2010 revealed 3 vaque foci of uptake in the L neck. Total iodine uptake was 0.2%.   She underwent radioablation with 159.2 mCi I 131, on 1/27/2010. The WBS done 1 week post treatment revealed 3 foci of uptake in the L neck, which were previously seen on the pretreatment scan.     11/5/12 neck US - normal appearing lymph nodes levels 2 B/L and 3 LN at left level 3   6/7/12 posttreatment WBS - negative   6/25/13 neck US - didn't identify lymph nodes with features suspicious of malignancy. A right level II lymph node appeared to be mildly larger compared with prior images from 2010. Two left level III lymph nodes were again noted, of normal size.   5/19/14 and 6/14/2016 neck USs  - no definite suspicious looking lymph nodes    Thyroglobulin antibodies were negative. I reviewed prior tyroglobulin levels:  5/5/10         0.21          TSH 7.48   10/25/10     <0.1          TSH 2.92  12/08/10      8.3             12/10/10      3.1           TSH 58.6   8/4/11         <0.1          TSH 0.03  6/8/12         <0.1          TSH 75.1  6/25/13       <0.1          TSH 12.8  3/5/14         <0.1          TSH 6.32   4/15/15       <0.1          TSH 2.4  5/24/16       <0.1          TSH 3.65   5/10/17       <0.1          TSH 4.74   7/13/17       <0.1           TSH 3.66     She continues to take 150  g levothyroxine daily 6 days a week and 1 1/2 tablets one day a week.  She takes the levothyroxine 30 minutes before breakfast and she does not have soy products with breakfast.    2. Hypocalcemia - requiring treatment with 0.5 mcg calcitriol daily, in the context of CKD with GFR in the 40s.     Most recent calcium was 8.9, for an albumin level of 3.2. Last PTH level was normal at 60, in 2016.    In a regular day, she drinks 2-3 glasses of milk and she takes an eye multivitamin.  She denies taking vitamin D or calcium supplements.    3.  Osteoporosis, not previously treated with antiresorptive drugs, due to impaired GFR.  Most recent DEXA scan from 2015 revealed a significant improvement of bone mineral density at the lumbar spine and mean hip (mainly right femoral neck).  The most negative T score was -2.8, at the right femoral neck.  In September 2018, she fell but she did not fracture her bones.  Since her last visit here, she reports 2 falls.  She has not been walking or exercising much.  She lives in a basement and she has to climb the stairs, which she considers the most important exercise for her.  When she walks, she has to pay attention as her knee easily locks.      4. Type 2 diabetes, diagnosed in May 2018, when A1c was 6.8%.  It was 7.5% today.    She denies blurred vision. She has galucoma and sees olpthamology regularly.   She has been experiencing numbness and tingling sensation in her ptosis is 2018.  The numbness is more pronounced in the right leg and she reports seeing a chiropractor for spine issues.  Urine microalbumin has been positive since 2014.  She is scheduled to see nephrology for proteinuria.  Recently, prednisone was prescribed at 40 mg daily, for 5 days, she still has 1 day left of treatment.  With the exception of the last few days, when fasting blood sugar was in the 200s, her prior blood glucose numbers, checked in the morning, where well  controlled, around 80.    In general, dinner is around 7-9 PM and she denies bedtime snacking.  She goes to bed anywhere between midnight to 2 AM.    5.  Mixed dyslipidemia  She follows up with cardiology.  She was not able to tolerate statins due to joint pain.  Currently, she is medicated with Zetia and alirocumab.        Current Outpatient Medications:      acetaminophen 500 MG CAPS, Take 1,000 mg by mouth nightly as needed Take 500-1,000 mg by mouth every 6 hours if needed. , Disp: , Rfl:      alirocumab (PRALUENT) 150 MG/ML injectable pen, Inject 1 mL (150 mg) Subcutaneous every 14 days, Disp: 6 mL, Rfl: 3     ampicillin (OMNIPEN) 250 MG injection, For allergy testing, Disp: 1 each, Rfl: 0     azithromycin (ZITHROMAX) 500 MG vial, For allergy testing, Disp: 1 each, Rfl: 0     benzoyl peroxide 5 % external liquid, Use daily as directed, Disp: 148 g, Rfl: 1     blood glucose monitoring (NO BRAND SPECIFIED) meter device kit, Use to test blood sugar 2times daily or as directed., Disp: 1 kit, Rfl: 0     blood glucose monitoring (NO BRAND SPECIFIED) test strip, Use to test blood sugar 2 times daily, before breakfast and before bedtime, Disp: 200 each, Rfl: 3     calcitRIOL (ROCALTROL) 0.5 MCG capsule, Take 1 capsule (0.5 mcg) by mouth daily, Disp: 90 capsule, Rfl: 3     cefTAZidime (FORTAZ) 1 GM vial, For Allergy Testing in Allergy Clinic Only, Disp: 1 each, Rfl: 0     ciprofloxacin-dexamethasone (CIPRODEX) otic suspension, Place 4 drops Into the left ear three times a week, Disp: 5.6 mL, Rfl: 6     clotrimazole (LOTRIMIN) 1 % cream, Apply topically 2 times daily as needed Reported on 4/25/2017, Disp: , Rfl:      doxycycline (VIBRAMYCIN) 100 mg vial to attach to  mL bag, For allergy testing, Disp: 10 mL, Rfl: 0     ELIQUIS 2.5 MG tablet, Take 1 tablet (2.5 mg) by mouth 2 times daily, Disp: 180 tablet, Rfl: 3     estradiol (ESTRACE VAGINAL) 0.1 MG/GM cream, Place 2 g vaginally twice a week, Disp: 42.5 g, Rfl:  11     estradiol (VAGIFEM) 10 MCG TABS vaginal tablet, Place 1 tablet (10 mcg) vaginally twice a week, Disp: 8 tablet, Rfl: 11     ezetimibe (ZETIA) 10 MG tablet, TAKE 1 TABLET BY MOUTH EVERY DAY OR AS DIRECTED BY DR WOLF, Disp: 90 tablet, Rfl: 3     ferrous gluconate (FERGON) 324 (38 Fe) MG tablet, Take 1 tablet (324 mg) by mouth 3 times daily (with meals), Disp: 90 tablet, Rfl: 0     fluconazole (DIFLUCAN) 200-0.9 MG/100ML-% intermittent infusion, For allergy testing, Disp: 100 mL, Rfl: 0     folic acid (FOLVITE) 1 MG tablet, Take 1 tablet (1 mg) by mouth daily, Disp: 100 tablet, Rfl: 3     furosemide (LASIX) 20 MG tablet, TAKE 1/2 TABLET BY MOUTH EVERY DAY, Disp: 45 tablet, Rfl: 3     hydrALAZINE (APRESOLINE) 25 MG tablet, Take 0.5 tablets (12.5 mg) by mouth 3 times daily as needed , if systolic blood pressure is more than 140 mmHg., Disp: 270 tablet, Rfl: 3     Hypromellose (ARTIFICIAL TEARS OP), Apply 1 drop to eye as needed, Disp: , Rfl:      levothyroxine (SYNTHROID/LEVOTHROID) 150 MCG tablet, TAKE 1.5 TABLETS BY MOUTH ON MONDAYS. AND 1 TABLET DAILY, THE OTHER DAYS OF THE WEEK., Disp: 102 tablet, Rfl: 3     meclizine (ANTIVERT) 25 MG tablet, Take 1 tablet (25 mg) by mouth as needed, Disp: 30 tablet, Rfl: 11     methenamine (HIPREX) 1 g tablet, Take 1 tablet (1 g) by mouth At Bedtime, Disp: 30 tablet, Rfl: 11     metoprolol succinate (TOPROL-XL) 50 MG 24 hr tablet, Take 1 tablet (50 mg) by mouth daily, Disp: 90 tablet, Rfl: 3     metroNIDAZOLE (METROCREAM) 0.75 % cream, Apply topically 2 times daily, Disp: 45 g, Rfl: 5     metroNIDAZOLE (METROGEL) 0.75 % external gel, Apply topically 2 times daily Put on face, Disp: 45 g, Rfl: 3     Multiple Vitamins-Minerals (PRESERVISION AREDS 2) CAPS, Take 1 capsule by mouth 2 times daily, Disp: , Rfl:      nitroFURantoin, macrocrystal-monohydrate, (MACROBID) 100 MG capsule, Take 1 capsule (100 mg) by mouth 2 times daily For one week at onset of UTI symptoms, Disp: 14  capsule, Rfl: 6     omega-3 acid ethyl esters (LOVAZA) 1 g capsule, Take 1 capsule (1 g) by mouth 2 times daily, Disp: 360 capsule, Rfl: 3     order for DME, Equipment being ordered: right brace with thumb, Disp: 1 Device, Rfl: 0     posaconazole (NOXAFIL) 100 MG EC tablet, Take 3 tablets (300 mg) by mouth every morning, Disp: 90 tablet, Rfl: 11     predniSONE (DELTASONE) 20 MG tablet, Take 1 tablet (20 mg) by mouth 2 times daily, Disp: 10 tablet, Rfl: 0     predniSONE (DELTASONE) 5 MG tablet, Take 1 tablet (5 mg) by mouth daily, Disp: 90 tablet, Rfl: 3     RAPAMUNE (BRAND) 1 MG tablet, Take 1 tablet (1 mg) by mouth every other day, Disp: 45 tablet, Rfl: 3     sertraline (ZOLOFT) 100 MG tablet, Take 1 tab (100mg) PO daily (Patient taking differently: Take 50 mg by mouth daily ), Disp: 90 tablet, Rfl: 0     SUMAtriptan (IMITREX) 50 MG tablet, Take 1 tablet (50 mg) by mouth at onset of headache for migraine repeat after 2 hours if needed., Disp: 30 tablet, Rfl: 3     triamcinolone (KENALOG) 0.025 % cream, Apply topically 2 times daily To eyelids, Disp: 15 g, Rfl: 1     triamcinolone (KENALOG) 0.1 % cream, Apply topically 2 times daily as needed for irritation, Disp: , Rfl:      ursodiol (ACTIGALL) 250 MG tablet, TAKE 1 TABLET BY MOUTH TWICE A DAY, Disp: 180 tablet, Rfl: 3     voriconazole (VFEND) 200 MG tablet, Take 1 tablet (200 mg) by mouth 2 times daily, Disp: 180 tablet, Rfl: 0     Wheat Dextrin (BENEFIBER) POWD, 2 teaspoons daily, Disp: , Rfl:     Current Facility-Administered Medications:      ceFAZolin (ANCEF) injection 500 mg, 500 mg, Intramuscular, Once, Will Rai MD     cefTRIAXone (ROCEPHIN) injection 250 mg, 250 mg, Intramuscular, Once, Will Rai MD     penicillin g potassium [id number] unit vial INTRADERMAL, 5,000,000 Units, Intradermal, Once, Will Rai MD    PAST MEDICAL HISTORY:   Primary biliary cirrhosis s/p transplant - may 2002.   Anemia of chronic disease.   Hypothyroidism  for 30 years treated with levothyroxine   Post menopausal.   CVA in , symptoms have resolved.   VRE in the stool.   Sjogren syndrome, diagnosed in the .   Migraines.   UTIs.   Superficial phlebitis.   History of esophageal varices.   Varicose veins.   Heart murmur.  On dyalisis while in coma for hepatic encephalopathy for 18 days  CKD with GFR in the 40s    Osteoporosis  Sepsis of unclear etiology  and    Fatty pancreas on CT  Diverticulosis   Kidney stone -   Emphysema   Atrial fibrillation   Lung nodules -considered benign on prior imaging    PAST SURGICAL HISTORY:   Orthotopic liver transplant in .   Sclerotherapy for varicose veins in 2008.   Vein stripping for varicose veins in .   Laparoscopic cholecystectomy in .   Appendectomy in .   A benign tumor removal of her right knee in .   L Tympanoplasty in .  Cataract surgery      FH:  Father  of prostate cancer. Mother has HTN, GERD. Both paternal grandmother and paternal grandfather had strokes later in life. Maternal grandmother had colon surgery (?cancer) and myocardial infarct at age 97.     SH.   . She has 2 children and 2 stepchildren. Occupation: retired medical social worker. She denies smoking, drinking alcohol or using illicit drugs.    Review of Systems   Systemic:             Sleeps 6 hrs a night ' sleeps good, no significant fatigue; weight up 13 lbs since 2018  Fatigue - longstanding and improved with the antidepressant, weight stable   Eye:                      No eye symptoms   Aracelis-Laryngeal:     no dysphagia, no hoarseness, no cough  Breast:                  No breast symptoms  Cardiovascular:    No cardiovascular symptoms, no CP or palpitations   Pulmonary:           SOB with exertion   Gastrointestinal:   No N/V, loose BMs noted recently, which she attributes to the recent antifungal treatment  Genitourinary:       No genitourinary symptoms, no increased thirst or urination; urinates  0-1 times a night    Endocrine:            cold intolerance with no changes over the years, swollen lower extremities  Neurological:        rare headaches, no tremor, no dizziness   Musculoskeletal:   Right thumb and knees joint pain     Skin:                     Swollen feet - wears compression sockings, dry skin, no hair loss   Psychological:     No psychological symptoms    Wt Readings from Last 10 Encounters:   08/05/19 85.3 kg (188 lb)   07/31/19 83 kg (183 lb)   07/09/19 83 kg (183 lb)   05/22/19 81.2 kg (179 lb)   05/21/19 80.7 kg (177 lb 14.6 oz)   05/16/19 80.7 kg (178 lb)   10/25/18 81.6 kg (179 lb 12.8 oz)   10/08/18 79.4 kg (175 lb)   09/21/18 78.7 kg (173 lb 6.4 oz)   09/12/18 78.6 kg (173 lb 5 oz)     /78   Pulse 71   Wt 85.3 kg (188 lb)   LMP 06/01/1988 (Approximate)   BMI 29.44 kg/m      General appearance: well-nourished, no distress noted, pale.  Eyes: conjunctivae and extraocular motions are normal. Pupils are equal, round, and reactive to light. No lid lag, no stare.  HENT: oropharynx clear and moist; neck no JVD, no bruits, no palpable thyroid masses   Cardiovascular: regular rhythm, + systolic murmur, distal pulses palpable, very mild lower extremities edema  Respiratory: chest clear, no rales, no rhonchi  Gastrointestinal: abdomen soft, nontender, nondistended, +BS, no organomegaly  Musculoskeletal: normal tone and strength, no lower extremity edema  Neurologic: no resting tremor, knee reflexes - unable to elicit  Psychiatric: affect and judgment normal   Skin: warm, vitiligo, lower extremities stasis dermatitis     Feet:  impaired sensation to monofilament testing - tip of the toes and ball of the feet - bilaterally    Labs:   I reviewed prior lab results documented in EPIC.   Lab Results   Component Value Date    A1C 6.4 10/16/2018    A1C 6.8 05/18/2018       TSH   Date Value Ref Range Status   05/20/2019 12.11 (H) 0.40 - 4.00 mU/L Final     T4 Total   Date Value Ref Range Status    09/23/2014 9.1 5.0 - 11.0 ug/dL Final     T4 Free   Date Value Ref Range Status   05/20/2019 0.99 0.76 - 1.46 ng/dL Final      Ref. Range 5/24/2016 08:46   Calcium Latest Ref Range: 8.5 - 10.1 mg/dL 8.8   GFR Estimate Latest Ref Range: >60 mL/min/1.7m2 29 (L)   Phosphorus Latest Ref Range: 2.5 - 4.5 mg/dL 3.2   Albumin Latest Ref Range: 3.4 - 5.0 g/dL 3.3 (L)   Parathyroid Hormone Intact Latest Ref Range: 12 - 72 pg/mL 60      Ref. Range 5/10/2017 09:29   Calcium Latest Ref Range: 8.5 - 10.1 mg/dL 9.7   GFR Estimate Latest Ref Range: >60 mL/min/1.7m2 34 (L)   Phosphorus Latest Ref Range: 2.5 - 4.5 mg/dL 3.1   Albumin Latest Ref Range: 3.4 - 5.0 g/dL 3.7     Assessment/Plan:    1. Type 2 diabetes, with worsening glycemic control since her last visit here. Is complicated by diabetic neuropathy.  Recent treatment with prednisone might contribute.  Recommendations:  Continue to check blood sugar once daily, fasting and at bedtime  Follow-up with nephrology regarding proteinuria  Have them meter downloaded in our clinic as needed.  If the glycemic control deteriorates, we are going to consider starting Tradjenta.    2. Postsurgical hypothyroidism   On the most recent lab work, the TSH was minimally elevated, while free T4 was normal.  I recommended to increase the dose of levothyroxine to 175 mcg daily and have the thyroid hormone levels rechecked in 2 months.    3. Hypocalcemia  Calcitriol was initially started in 2010 for management of postsurgical hypocalcemia, presumed to be due to hypoparathyroidism. The PTH level increased after surgery, but the hypocalcemia persisted, requiring tx with calcitriol despite normal 25 OH vitamin D levels. In 2012, it was aggravated by malabsorption, hypomagnesemia. It is now well controlled with calcitriol.  The daily intake of calcium appears to be appropriate, based on the amount of dairy products.  Follow-up lab work.    4. Papillary thyroid cancer s/p total thyroidectomy,  central neck dissection and radioablation. Post treatment scan in 6/12 was negative for recurrence. Thyrogen stimulated Tg level was undetectable in June 2012. The nonstimulated thyroglobulin remained undetectable, with most recent value from 2017. On the most recent neck ultrasound from 2016, there were no suspicious looking lymph nodes.   I did not recommend further monitoring.    5.  Osteoporosis.  Schedule a follow-up DEXA scan, including 33% distal radius.  Follow-up calcium, albumin, phosphorus, PTH.    Orders Placed This Encounter   Procedures     DX Wrist Heel Radius     DX Hip/Pelvis/Spine     Hemoglobin A1c     TSH     T4 free     T3 total     Calcium     Albumin level     Phosphorus     Parathyroid Hormone Intact     Hemoglobin A1c POCT

## 2019-08-06 ENCOUNTER — MYC MEDICAL ADVICE (OUTPATIENT)
Dept: UROLOGY | Facility: CLINIC | Age: 69
End: 2019-08-06

## 2019-08-06 ENCOUNTER — ALLIED HEALTH/NURSE VISIT (OUTPATIENT)
Dept: EDUCATION SERVICES | Facility: CLINIC | Age: 69
End: 2019-08-06
Attending: INTERNAL MEDICINE
Payer: MEDICARE

## 2019-08-06 DIAGNOSIS — E11.22 TYPE 2 DIABETES MELLITUS WITH STAGE 3 CHRONIC KIDNEY DISEASE, WITHOUT LONG-TERM CURRENT USE OF INSULIN (H): Primary | ICD-10-CM

## 2019-08-06 DIAGNOSIS — N18.30 TYPE 2 DIABETES MELLITUS WITH STAGE 3 CHRONIC KIDNEY DISEASE, WITHOUT LONG-TERM CURRENT USE OF INSULIN (H): Primary | ICD-10-CM

## 2019-08-06 LAB
ERYTHROCYTE [DISTWIDTH] IN BLOOD BY AUTOMATED COUNT: 15.6 % (ref 10–15)
HCT VFR BLD AUTO: 35.7 % (ref 35–47)
HGB BLD-MCNC: 11.5 G/DL (ref 11.7–15.7)
IGA SERPL-MCNC: 135 MG/DL (ref 70–380)
IGG SERPL-MCNC: 1110 MG/DL (ref 695–1620)
IGM SERPL-MCNC: 120 MG/DL (ref 60–265)
MCH RBC QN AUTO: 28.5 PG (ref 26.5–33)
MCHC RBC AUTO-ENTMCNC: 32.2 G/DL (ref 31.5–36.5)
MCV RBC AUTO: 88 FL (ref 78–100)
PLATELET # BLD AUTO: 367 10E9/L (ref 150–450)
PROT ELPH PNL UR ELPH: NORMAL
PROT PATTERN SERPL IFE-IMP: NORMAL
RBC # BLD AUTO: 4.04 10E12/L (ref 3.8–5.2)
WBC # BLD AUTO: 9.1 10E9/L (ref 4–11)

## 2019-08-07 ENCOUNTER — OFFICE VISIT (OUTPATIENT)
Dept: CARDIOLOGY | Facility: CLINIC | Age: 69
End: 2019-08-07
Attending: NURSE PRACTITIONER
Payer: MEDICARE

## 2019-08-07 ENCOUNTER — TELEPHONE (OUTPATIENT)
Dept: CARDIOLOGY | Facility: CLINIC | Age: 69
End: 2019-08-07

## 2019-08-07 ENCOUNTER — MYC MEDICAL ADVICE (OUTPATIENT)
Dept: ALLERGY | Facility: CLINIC | Age: 69
End: 2019-08-07

## 2019-08-07 VITALS
SYSTOLIC BLOOD PRESSURE: 125 MMHG | HEIGHT: 68 IN | DIASTOLIC BLOOD PRESSURE: 72 MMHG | HEART RATE: 69 BPM | OXYGEN SATURATION: 99 % | WEIGHT: 188.3 LBS | BODY MASS INDEX: 28.54 KG/M2

## 2019-08-07 VITALS
DIASTOLIC BLOOD PRESSURE: 72 MMHG | SYSTOLIC BLOOD PRESSURE: 125 MMHG | BODY MASS INDEX: 28.54 KG/M2 | HEART RATE: 69 BPM | WEIGHT: 188.3 LBS | HEIGHT: 68 IN | OXYGEN SATURATION: 99 %

## 2019-08-07 DIAGNOSIS — I48.0 PAROXYSMAL ATRIAL FIBRILLATION (H): ICD-10-CM

## 2019-08-07 DIAGNOSIS — I48.0 PAROXYSMAL ATRIAL FIBRILLATION (H): Primary | ICD-10-CM

## 2019-08-07 DIAGNOSIS — Z86.73 H/O: CVA (CEREBROVASCULAR ACCIDENT): ICD-10-CM

## 2019-08-07 DIAGNOSIS — I10 BENIGN ESSENTIAL HYPERTENSION: ICD-10-CM

## 2019-08-07 DIAGNOSIS — E78.5 HYPERLIPIDEMIA LDL GOAL <100: ICD-10-CM

## 2019-08-07 DIAGNOSIS — E78.1 HYPERTRIGLYCERIDEMIA: ICD-10-CM

## 2019-08-07 DIAGNOSIS — E78.5 HYPERLIPIDEMIA LDL GOAL <70: ICD-10-CM

## 2019-08-07 DIAGNOSIS — E78.49 OTHER HYPERLIPIDEMIA: ICD-10-CM

## 2019-08-07 PROCEDURE — 99214 OFFICE O/P EST MOD 30 MIN: CPT | Mod: ZP | Performed by: NURSE PRACTITIONER

## 2019-08-07 PROCEDURE — 93010 ELECTROCARDIOGRAM REPORT: CPT | Mod: ZP | Performed by: INTERNAL MEDICINE

## 2019-08-07 PROCEDURE — 93005 ELECTROCARDIOGRAM TRACING: CPT | Mod: ZF

## 2019-08-07 PROCEDURE — G0463 HOSPITAL OUTPT CLINIC VISIT: HCPCS | Mod: ZF

## 2019-08-07 PROCEDURE — G0463 HOSPITAL OUTPT CLINIC VISIT: HCPCS | Mod: 25,ZF

## 2019-08-07 RX ORDER — OMEGA-3-ACID ETHYL ESTERS 1 G/1
1 CAPSULE, LIQUID FILLED ORAL 2 TIMES DAILY
Qty: 360 CAPSULE | Refills: 3 | Status: SHIPPED | OUTPATIENT
Start: 2019-08-07 | End: 2019-08-12

## 2019-08-07 RX ORDER — EZETIMIBE 10 MG/1
10 TABLET ORAL DAILY
Qty: 90 TABLET | Refills: 3 | Status: ON HOLD | OUTPATIENT
Start: 2019-08-07 | End: 2019-08-31

## 2019-08-07 RX ORDER — LOSARTAN POTASSIUM 25 MG/1
25 TABLET ORAL DAILY
Status: ON HOLD | COMMUNITY
Start: 2019-08-07 | End: 2019-08-31

## 2019-08-07 RX ORDER — HYDRALAZINE HYDROCHLORIDE 25 MG/1
12.5 TABLET, FILM COATED ORAL 3 TIMES DAILY PRN
Qty: 270 TABLET | Refills: 3 | Status: SHIPPED | OUTPATIENT
Start: 2019-08-07 | End: 2019-08-12

## 2019-08-07 ASSESSMENT — PAIN SCALES - GENERAL
PAINLEVEL: NO PAIN (0)
PAINLEVEL: NO PAIN (0)

## 2019-08-07 ASSESSMENT — PATIENT HEALTH QUESTIONNAIRE - PHQ9: SUM OF ALL RESPONSES TO PHQ QUESTIONS 1-9: 3

## 2019-08-07 ASSESSMENT — MIFFLIN-ST. JEOR
SCORE: 1414.68
SCORE: 1414.68

## 2019-08-07 NOTE — LETTER
8/7/2019      RE: Luz Thompson  [address]  [place] [address]       Dear Colleague,    Thank you for the opportunity to participate in the care of your patient, Luz Thompson, at the [facility] at [facility]. Please see a copy of my visit note below.    Electrophysiology Clinic Note  HPI:   Ms. Thompson is a 69 year old female who has a past medical history significant for HTN,HLD, PAF (CHADSVASC 5 on Eliquis), CKD, PBC s/p OTL 2002, CVA, Sjogren's, osteoporosis, and vasovagal syncope. She presents today for follow up.     She was diagnosed with PAF in 2017 when she was hospitalized for coccidioidomycosis.  Echo and NM Alanis scan at that time were normal. Anticoagulation was discussed with her and she elected not to pursue at that time. She subsequently had 2 more episodes of palpitations associated with SOB and anxiety. Longest episode lasting 3 hours. She saw Dr. Gilmore in 5/2017 and agreed to start Eliquis and Toprol XL at that time. She underwent colonoscopy in 8/2017 which was complicated by colon perforation necessitating temporarily holding her Eliquis. She did eventually resume Eliquis and has been doing well without bleeding issues. Of note she was on statin in the past which caused muscle aches and bone pain and has subsequently stopped.      EP Visit 8/15/18: She presents today for follow up. She reports feeling well. She has had a difficult year She underwent a divorce and was homeless for a period of time,but now has apartment in [place]. She did have an episode of syncope during a hot shower a few months ago. This was felt to be vagal in nature.  She did not sustain any significant injuries. She denies any chest pain/pressures, dizziness, lightheadedness, worsening shortness of breath, leg/ankle swelling, PND, orthopnea, palpitations, or syncopal symptoms. Echo from 5/2018 showed LVEF 60-% with mild/moderate MRManny Presenting 12  "lead ECG shows NSR Vent Rate 68 bpm,  ms, QRS 82 ms, QTc 433 ms. Current cardiac medications include: Zetia, Lasix, Eliquis, Toprol XL, and Hydralazine.     She presents today for follow up. She reports feeling well. She states she felt she had a 30 minute episode of AF in 6/2019 that resolved on its own. She otherwise denies any chest pain/pressures, dizziness, lightheadedness, worsening shortness of breath, leg/ankle swelling, PND, orthopnea, palpitations, or syncopal symptoms. Presenting 12 lead ECG shows NSR Vent Rate 67 bpm,  ms, QRS 88 ms, QTc 429 ms. Current cardiac medications include: Zetia, Lasix, Eliquis, Toprol XL, and Hydralazine.     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      Diverticulosis of sigmoid colon 12/21/2013     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     Received Rituxan during Summer of 2016     H/O esophageal varices      Hearing loss      Heart murmur 4/4/2012     History of DVT (deep vein thrombosis)      History of [place] fever      History of thyroid cancer 9/25/2012     Hyperlipidemia 4/10/2012    Says that she does not have it anymore, not on meds     Hyperlipidemia LDL goal <70      Hypertension goal BP (blood pressure) < 140/80 11/06/2013     Hypertriglyceridemia      Liver replaced by transplant (H) 10/17/2011    Dr. 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        alirocumab (PRALUENT) 150 MG/ML injectable pen Inject 1 mL (150 mg) Subcutaneous every 14 days 6 mL 3     ampicillin (OMNIPEN) 250 MG injection For allergy testing 1 each 0     azithromycin (ZITHROMAX) 500 MG vial For allergy testing 1 each 0     benzoyl peroxide 5 % external liquid Use daily as directed 148 g 1     blood glucose monitoring (NO BRAND SPECIFIED) meter device kit Use to test blood sugar 2times daily or as directed. 1 kit 0     blood glucose monitoring (NO BRAND SPECIFIED) test strip Use to test blood sugar 2 times daily, before breakfast and before bedtime 200 each 3     calcitRIOL (ROCALTROL) 0.5 MCG capsule Take 1 capsule (0.5 mcg) by mouth daily 90 capsule 3     cefTAZidime (FORTAZ) 1 GM vial For Allergy Testing in Allergy Clinic Only 1 each 0     ciprofloxacin-dexamethasone (CIPRODEX) otic suspension Place 4 drops Into the left ear three times a week 5.6 mL 6     clotrimazole (LOTRIMIN) 1 % cream Apply topically 2 times daily as needed Reported on 4/25/2017       doxycycline (VIBRAMYCIN) 100 mg vial to attach to  mL bag For allergy testing 10 mL 0     ELIQUIS 2.5 MG tablet Take 1 tablet (2.5 mg) by mouth 2 times daily 180 tablet 3     estradiol (ESTRACE VAGINAL) 0.1 MG/GM cream Place 2 g vaginally twice a week 42.5 g 11     estradiol (VAGIFEM) 10 MCG TABS vaginal tablet Place 1 tablet (10 mcg) vaginally twice a week 8 tablet 11     ezetimibe (ZETIA) 10 MG tablet TAKE 1 TABLET BY MOUTH EVERY DAY OR AS DIRECTED BY DR WOLF 90 tablet 3      ferrous gluconate (FERGON) 324 (38 Fe) MG tablet Take 1 tablet (324 mg) by mouth 3 times daily (with meals) 90 tablet 0     fluconazole (DIFLUCAN) 200-0.9 MG/100ML-% intermittent infusion For allergy testing 100 mL 0     folic acid (FOLVITE) 1 MG tablet Take 1 tablet (1 mg) by mouth daily 100 tablet 3     furosemide (LASIX) 20 MG tablet Take 1 tablet (20 mg) by mouth daily 90 tablet 3     hydrALAZINE (APRESOLINE) 25 MG tablet Take 0.5 tablets (12.5 mg) by mouth 3 times daily as needed , if systolic blood pressure is more than 140 mmHg. 270 tablet 3     Hypromellose (ARTIFICIAL TEARS OP) Apply 1 drop to eye as needed       levothyroxine (SYNTHROID/LEVOTHROID) 175 MCG tablet Take 1 tablet (175 mcg) by mouth daily 90 tablet 3     losartan (COZAAR) 25 MG tablet Take 1 tablet (25 mg) by mouth daily 30 tablet 11     meclizine (ANTIVERT) 25 MG tablet Take 1 tablet (25 mg) by mouth as needed 30 tablet 11     methenamine (HIPREX) 1 g tablet Take 1 tablet (1 g) by mouth At Bedtime 30 tablet 11     metoprolol succinate (TOPROL-XL) 50 MG 24 hr tablet Take 1 tablet (50 mg) by mouth daily 90 tablet 3     metroNIDAZOLE (METROCREAM) 0.75 % cream Apply topically 2 times daily 45 g 5     metroNIDAZOLE (METROGEL) 0.75 % external gel Apply topically 2 times daily Put on face 45 g 3     Multiple Vitamins-Minerals (PRESERVISION AREDS 2) CAPS Take 1 capsule by mouth 2 times daily       nitroFURantoin, macrocrystal-monohydrate, (MACROBID) 100 MG capsule Take 1 capsule (100 mg) by mouth 2 times daily For one week at onset of UTI symptoms 14 capsule 6     omega-3 acid ethyl esters (LOVAZA) 1 g capsule Take 1 capsule (1 g) by mouth 2 times daily 360 capsule 3     order for DME Equipment being ordered: right brace with thumb 1 Device 0     posaconazole (NOXAFIL) 100 MG EC tablet Take 3 tablets (300 mg) by mouth every morning 90 tablet 11     predniSONE (DELTASONE) 20 MG tablet Take 1 tablet (20 mg) by mouth 2 times daily 10 tablet 0      predniSONE (DELTASONE) 5 MG tablet Take 1 tablet (5 mg) by mouth daily 90 tablet 3     RAPAMUNE (BRAND) 1 MG tablet Take 1 tablet (1 mg) by mouth every other day 45 tablet 3     sertraline (ZOLOFT) 100 MG tablet Take 1 tab (100mg) PO daily (Patient taking differently: Take 50 mg by mouth daily ) 90 tablet 0     SUMAtriptan (IMITREX) 50 MG tablet Take 1 tablet (50 mg) by mouth at onset of headache for migraine repeat after 2 hours if needed. 30 tablet 3     triamcinolone (KENALOG) 0.025 % cream Apply topically 2 times daily To eyelids 15 g 1     triamcinolone (KENALOG) 0.1 % cream Apply topically 2 times daily as needed for irritation       ursodiol (ACTIGALL) 250 MG tablet TAKE 1 TABLET BY MOUTH TWICE A  tablet 3     voriconazole (VFEND) 200 MG tablet Take 1 tablet (200 mg) by mouth 2 times daily 180 tablet 0     Wheat Dextrin (BENEFIBER) POWD 2 teaspoons daily         PAST SURGICAL HISTORY:  Past Surgical History:   Procedure Laterality Date     APPENDECTOMY  1961     BIOPSY       CATARACT IOL, RT/LT      RE12/19/2013, LE12/10/2013 - Toric lenses     CHOLECYSTECTOMY  1991     COLONOSCOPY  3/10/2014    Procedure: COLONOSCOPY;;  Surgeon: Gentry Ramirez MD;  [facility]: [facility]     ear drum repair       ENDOBRONCHIAL ULTRASOUND FLEXIBLE N/A 9/29/2017    Procedure: ENDOBRONCHIAL ULTRASOUND FLEXIBLE;  Flexible Bronchoscopy, Endobronchial Ultrasound, Transbronchial Needle Aspiration ;  Surgeon: Eden Clinton MD;  Location: [facility]     ENDOSCOPIC RETROGRADE CHOLANGIOPANCREATOGRAM  9/19/2013    Procedure: ENDOSCOPIC RETROGRADE CHOLANGIOPANCREATOGRAM;  Endoscopic Retrograde Cholangiopancreatogram with single balloon enteroscopy, ballon sweep of bile duct;  Surgeon: Brett Membreno MD;  Location: [facility] OR      KNEE SCOPE,MED/LAT MENISECTOMY  8/10/12    Right, partial medial menisectomy only     KNEE SURGERY  1966    R knee     PICC INSERTION  9/18/2013    4fr SL PASV PICC, 40cm (1cm external) in the R basilic vein w/  tip in the low SVC     PICC INSERTION  2014    5 fr DL BioFlo Navilyst PICC, 46 cm (3 cm external) in the L basilic vein w/ tip in the SVC RA junction.     THYROIDECTOMY  3/2010     TRANSPLANT LIVER RECIPIENT LIVING UNRELATED         ALLERGIES:     Allergies   Allergen Reactions     Fluconazole Hives and Itching     Ciprofloxacin Anxiety and Other (See Comments)     Irregular heart beat     Azithromycin Itching     Benadryl [Diphenhydramine Hcl]      Insomnia      Cellcept Diarrhea     Ciprofloxacin Other (See Comments)     Insomnia, mood lability     Codeine      Psych disturbance     Codeine      Diphenhydramine Other (See Comments)     Doxycycline      Lansoprazole Diarrhea     Levaquin [Levofloxacin] Other (See Comments)     Headache, hyperactivity     Lisinopril Cough     Methotrexate      Sores     Methotrexate      Morphine Sulfate Itching     Mycophenolate Diarrhea     No Clinical Screening - See Comments      Simvastatin Muscle Pain (Myalgia)     severe     Cephalexin Rash     Fever and skin burning     Penicillin G Itching and Rash     Tolectin [Nsaids] Rash     Tramadol Rash       FAMILY HISTORY:  Family History   Problem Relation Age of Onset     Hypertension Mother      Endometrial Cancer Mother      Hyperlipidemia Mother      Prostate Cancer Father      Macular Degeneration Father      Cancer - colorectal Maternal Grandmother         in her 80's, has surgery and removal of part of kidney,  at age 98     Heart Disease Maternal Grandfather          at 98     Glaucoma Maternal Grandfather      Cerebrovascular Disease Paternal Grandmother         in her 80's     Hypertension Paternal Grandmother      Heart Disease Paternal Grandfather         MI     Alzheimer Disease Paternal Grandfather      Allergies Son      Neurologic Disorder Daughter         Migraines     Breast Cancer Other      Anesthesia Reaction No family hx of      Crohn's Disease No family hx of      Ulcerative Colitis No  "family hx of        SOCIAL HISTORY:  Social History     Tobacco Use     Smoking status: Former Smoker     Packs/day: 1.00     Years: 18.00     Pack years: 18.00     Types: Cigarettes     Last attempt to quit: 1985     Years since quittin.3     Smokeless tobacco: Never Used   Substance Use Topics     Alcohol use: Yes     Alcohol/week: 0.0 oz     Comment: rare - \"I toast at weddings\"     Drug use: No       ROS:   A comprehensive 10 point review of systems negative other than as mentioned in HPI.  Exam:  /72 (BP Location: Right arm, Patient Position: Chair, Cuff Size: Adult Large)   Pulse 69   Ht 1.715 m (5' 7.5\")   Wt 85.4 kg (188 lb 4.8 oz)   LMP 1988 (Approximate)   SpO2 99%   BMI 29.06 kg/m     GENERAL APPEARANCE: alert and no distress  HEENT: no icterus, no xanthelasmas, normal pupil size and reaction, normal palate, mucosa moist, no central cyanosis  NECK: no adenopathy, no asymmetry, masses, or scars, thyroid normal to palpation and no bruits, JVP not elevated  RESPIRATORY: lungs clear to auscultation - no rales, rhonchi or wheezes, no use of accessory muscles, no retractions, respirations are unlabored, normal respiratory rate  CARDIOVASCULAR: regular rhythm, normal S1 with physiologic split S2, no S3 or S4 and no murmur, click or rub, precordium quiet with normal PMI.  ABDOMEN: soft, non tender, bowel sounds normal, non-distended  EXTREMITIES: peripheral pulses normal, no edema  NEURO: alert and oriented to person/place/time, normal speech, gait and affect  SKIN: no ecchymoses, no rashes  PSYCH: normal affect, cooperative    Labs:  Reviewed.     Testing/Procedures:  PULMONARY FUNCTION TESTS:   PFT Latest Ref Rng & Units 2018   FVC L 2.63   FEV1 L 2.22   FVC% % 80   FEV1% % 88         2018 ECHOCARDIOGRAM:   Interpretation Summary     1. The left ventricle is normal in structure, function and size. The visual  ejection fraction is estimated at 60%.  2. The right ventricle is " normal in structure, function and size.  3. There is mild to moderate (1-2+) mitral regurgitation.  4. There is no atrial shunt seen. A contrast injection (Bubble Study) was  performed that was negative for flow across the interatrial septum.     No significant change from echo 2015.    Assessment and Plan:   Ms. Thompson is a 69 year old female who has a past medical history significant for HTN,HLD, PAF (CHADSVASC 5 on Eliquis), CKD, PBC s/p OTL , CVA, Sjogren's, osteoporosis, and vasovagal syncope. She was diagnosed with PAF in  when she was hospitalized for coccidioidomycosis.  Echo and NM Alanis scan at that time were normal. Anticoagulation was discussed with her and she elected not to pursue at that time. She subsequently had 2 more episodes of palpitations associated with SOB and anxiety. Longest episode lasting 3 hours. She saw Dr. Gilmore in 2017 and agreed to start Eliquis and Toprol XL at that time. She underwent colonoscopy in 2017 which was complicated by colon perforation necessitating temporarily holding her Eliquis. She did eventually resume Eliquis and has been doing well without bleeding issues. Of note she was on statin in the past which caused muscle aches and bone pain and has subsequently stopped.  Echo from 2018 showed LVEF 60-% with mild/moderate MR. She presents today for follow up. She reports feeling well. She states she felt she had a 30 minute episode of AF in 2019 that resolved on its own. She otherwise denies any chest pain/pressures, dizziness, lightheadedness, worsening shortness of breath, leg/ankle swelling, PND, orthopnea, palpitations, or syncopal symptoms. Presenting 12 lead ECG shows NSR Vent Rate 67 bpm,  ms, QRS 88 ms, QTc 429 ms. Current cardiac medications include: Zetia, Lasix, Eliquis, Toprol XL, and Hydralazine.    Paroxysmal Atrial Fibrillation:  We discussed in detail with the patient management/treatment options for A.fib includin. Stroke  Prophylaxis:  CHADSVASC=+age, +gender, +HTN, ++CVA  5, corresponding to a 6.7% annual stroke / systemic emolism event rate. indicating need for long term oral anticoagulation.  She is appropriately on Eliquis. No bleeding issues.   2. Rate Control: Continue Toprol XL.   3. Rhythm Control: Cardioversion, She is doing well with low burden. No compelling indication for rhythm control at this time.     4. Risk Factor Management: Continue Zetia, maintain good BP control, and RASHIDA evaluation as indicated.      Follow up in 1 year.     The patient states understanding and is agreeable with plan.   LIZ Og CNP  Pager: [phone]    CC  SELF, REFERRED

## 2019-08-07 NOTE — PROGRESS NOTES
"Diabetes Self-Management Education & Support    Diabetes Education Self Management & Training    SUBJECTIVE/OBJECTIVE:  Presents for: Follow-up  Accompanied by: Self  Diabetes education in the past 24mo: Yes  Focus of Visit: Healthy Eating  Diabetes type: Type 2  Date of diagnosis: 2018 - had one education session before leaving for [place] for most of the year  Diabetes management related comments/concerns: Is thinking about starting [employer] and would like to review that with me to see if it is a reasonable program for her  Other concerns:: None  Cultural Influences/Ethnic Background:  Not  or     Diabetes Symptoms & Complications          Patient Problem List and Family Medical History reviewed for relevant medical history, current medical status, and diabetes risk factors.    Vitals:  LMP 06/01/1988 (Approximate)   Estimated body mass index is 29.06 kg/m  as calculated from the following:    Height as of 8/7/19: 1.715 m (5' 7.5\").    Weight as of 8/7/19: 85.4 kg (188 lb 4.8 oz).   Last 3 BP:   BP Readings from Last 3 Encounters:   08/07/19 125/72   08/07/19 125/72   08/05/19 126/76       History   Smoking Status     Former Smoker     Packs/day: 1.00     Years: 18.00     Types: Cigarettes     Quit date: 4/12/1985   Smokeless Tobacco     Never Used       Labs:  Lab Results   Component Value Date    A1C 6.4 10/16/2018     Lab Results   Component Value Date     08/05/2019     Lab Results   Component Value Date    LDL 91 05/20/2019     HDL Cholesterol   Date Value Ref Range Status   05/20/2019 65 >49 mg/dL Final   ]  GFR Estimate   Date Value Ref Range Status   08/05/2019 38 (L) >60 mL/min/[1.73_m2] Final     Comment:     Non  GFR Calc  Starting 12/18/2018, serum creatinine based estimated GFR (eGFR) will be   calculated using the Chronic Kidney Disease Epidemiology Collaboration   (CKD-EPI) equation.       GFR Estimate If Black   Date Value Ref Range Status   08/05/2019 44 (L) " >60 mL/min/[1.73_m2] Final     Comment:      GFR Calc  Starting 12/18/2018, serum creatinine based estimated GFR (eGFR) will be   calculated using the Chronic Kidney Disease Epidemiology Collaboration   (CKD-EPI) equation.       Lab Results   Component Value Date    CR 1.41 08/05/2019     No results found for: MICROALBUMIN    Healthy Eating  Breakfast: Brunch: eggs, english muffin, apple sayce or yogurt, coffee or instant oatmeal with walnuts   Dinner: Tonight she is going to make crockpot chicken with vegetables and she will use some of the chicken and broth to also make a soup. [place] and zucchini, tomatoes, soup  Snacks: Not doing much snacking. yogurt. boost. soda crackers.     Being Active  Barrier to exercise: Physical limitation(Tendonitis. Reports that she is more active in [place].)    Monitoring  Did patient bring glucose meter to appointment? : No(Does not have concerns about her glucose. But would like to get a new meter. Will discuss with RN CDE at appointment on Friday. )    Taking Medications      Current Treatments: Diet    Problem Solving  Problem Solving Assessed Today: No    Reducing Risks  Reducing Risks Assessed Today: No    Healthy Coping  Emotional response to diabetes: Ready to learn  Patient Activation Measure Survey Score:  SANDIP Score (Last Two) 4/4/2012 5/24/2016   SANDIP Raw Score 41 38   Activation Score 63.2 52.9   SANDIP Level 3 2       ASSESSMENT:  Patient was seen by this RD CDE about one year ago shortly after her diagnosis. Shortly after that appointment she moved to [place] for most of the year, returning in May. Since then, she has experienced a difficult divorce as well as the death of her new fiance in [place]. Patient states she has been grieving and is starting to feel better now and is looking forward to going back to [place] next month. Currently patient lives in a friends basement and she does not have access to a full kitchen. She uses microwave and crockpot and a  ronda. She is eating pretty healthy considering the limitations and is looking forward to having her kitchen again. Patient is considering a meal delivery service to supplement her own cooking. She would like to review [employer] today, which we did. [employer] has an American Diabetes Association approved diabetes menu and seems to be a good options for her.   I recommended to patient that she see our RN CDE for more diabetes education prior to leaving for [place] and she was open to that. She is interested in getting a new glucose meter. States hers was a cheap one that doesn't work very well. She is also interested in learning more about foot care and pathophysiology.   Patient's most recent   Lab Results   Component Value Date    A1C 6.4 10/16/2018    is meeting goal of <7.0    INTERVENTION:   Diabetes knowledge and skills assessment:     Based on learning assessment above, most appropriate setting for further diabetes education would be: Individual setting.    Education provided today on:  AADE Self-Care Behaviors:  Healthy Eating: reviewed general healthy eating, consistency in amount, composition, and timing of food intake, heart healthy diet and plate planning method    Opportunities for ongoing education and support in diabetes-self management were discussed.    Pt verbalized understanding of concepts discussed and recommendations provided today.       Education Materials Provided:  No new materials provided today    PLAN:  See Patient Instructions for co-developed, patient-stated behavior change goals.  AVS printed and provided to patient today. See Follow-Up section for recommended follow-up.  Bring glucose meter to follow up appointment with RN CDE  Time Spent: 60 minutes  Encounter Type: Individual    Any diabetes medication dose changes were made via the CDE Protocol and Collaborative Practice Agreement with the patient's referring provider. A copy of this encounter was shared with the provider.

## 2019-08-07 NOTE — TELEPHONE ENCOUNTER
Prior Authorization Retail Medication Request    Medication/Dose: Omega-3-acid Ethyl Esters 1 GM Capsules  ICD code (if different than what is on RX):    Previously Tried and Failed:    Rationale:      Insurance Name:  Medicare  Insurance ID:  [id number]      Pharmacy Information (if different than what is on RX)  Name:    Phone:

## 2019-08-07 NOTE — PATIENT INSTRUCTIONS
You were seen today in the Cardiovascular Clinic at the [facility].    Cardiology provider you saw during your visit Britt Oswald NP.    1. Continue current cholesterol medications.  2. Have fasting lipids drawn next months, then annually thereafter.  3. Your blood pressures are elevated at home, agree with starting losartan 25 mg daily.   4. Continue to follow with your kidney doctor for ongoing management.   5. Please make a follow-up appointment with myself or Dr. Reyna in 1 year with cholesterol levels prior to appt.     Questions and [phone].   First press #1 for the [employer] and then press #3 for Medical Questions to reach the Cardiology triage nurse.     On Call Cardiologist for after hours or on weekends: [phone], press option #4 and ask to speak to the on-call Cardiologist.

## 2019-08-07 NOTE — NURSING NOTE
Vitals completed successfully and medication reconciled. EKG done.   Tila Clemens CMA  11:10 AM  Chief Complaint   Patient presents with     Follow Up     1 yr follow up

## 2019-08-07 NOTE — NURSING NOTE
Vitals completed successfully and medication reconciled.     Tila Clemens CMA  11:14 AM  Chief Complaint   Patient presents with     Follow Up     follow up

## 2019-08-07 NOTE — PROGRESS NOTES
Electrophysiology Clinic Note  HPI:   Ms. Thompson is a 69 year old female who has a past medical history significant for HTN,HLD, PAF (CHADSVASC 5 on Eliquis), CKD, PBC s/p OTL 2002, CVA, Sjogren's, osteoporosis, and vasovagal syncope. She presents today for follow up.     She was diagnosed with PAF in 2017 when she was hospitalized for coccidioidomycosis.  Echo and NM Alanis scan at that time were normal. Anticoagulation was discussed with her and she elected not to pursue at that time. She subsequently had 2 more episodes of palpitations associated with SOB and anxiety. Longest episode lasting 3 hours. She saw Dr. Gilmore in 5/2017 and agreed to start Eliquis and Toprol XL at that time. She underwent colonoscopy in 8/2017 which was complicated by colon perforation necessitating temporarily holding her Eliquis. She did eventually resume Eliquis and has been doing well without bleeding issues. Of note she was on statin in the past which caused muscle aches and bone pain and has subsequently stopped.      EP Visit 8/15/18: She presents today for follow up. She reports feeling well. She has had a difficult year She underwent a divorce and was homeless for a period of time,but now has apartment in [place]. She did have an episode of syncope during a hot shower a few months ago. This was felt to be vagal in nature.  She did not sustain any significant injuries. She denies any chest pain/pressures, dizziness, lightheadedness, worsening shortness of breath, leg/ankle swelling, PND, orthopnea, palpitations, or syncopal symptoms. Echo from 5/2018 showed LVEF 60-% with mild/moderate MR. Presenting 12 lead ECG shows NSR Vent Rate 68 bpm,  ms, QRS 82 ms, QTc 433 ms. Current cardiac medications include: Zetia, Lasix, Eliquis, Toprol XL, and Hydralazine.     She presents today for follow up. She reports feeling well. She states she felt she had a 30 minute episode of AF in 6/2019 that resolved on its own. She otherwise  "denies any chest pain/pressures, dizziness, lightheadedness, worsening shortness of breath, leg/ankle swelling, PND, orthopnea, palpitations, or syncopal symptoms. Presenting 12 lead ECG shows NSR Vent Rate 67 bpm,  ms, QRS 88 ms, QTc 429 ms. Current cardiac medications include: Zetia, Lasix, Eliquis, Toprol XL, and Hydralazine.     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      Diverticulosis of sigmoid colon 12/21/2013     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     Received Rituxan during Summer of 2016     H/O esophageal varices      Hearing loss      Heart murmur 4/4/2012     History of DVT (deep vein thrombosis)      History of [facility] fever      History of thyroid cancer 9/25/2012     Hyperlipidemia 4/10/2012    Says that she does not have it anymore, not on meds     Hyperlipidemia LDL goal <70      Hypertension goal BP (blood pressure) < 140/80 11/06/2013     Hypertriglyceridemia      Liver replaced by transplant (H) 10/17/2011    Dr. 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        alirocumab (PRALUENT) 150 MG/ML injectable pen Inject 1 mL (150 mg) Subcutaneous every 14 days 6 mL 3     ampicillin (OMNIPEN) 250 MG injection For allergy testing 1 each 0     azithromycin (ZITHROMAX) 500 MG vial For allergy testing 1 each 0     benzoyl peroxide 5 % external liquid Use daily as directed 148 g 1     blood glucose monitoring (NO BRAND SPECIFIED) meter device kit Use to test blood sugar 2times daily or as directed. 1 kit 0     blood glucose monitoring (NO BRAND SPECIFIED) test strip Use to test blood sugar 2 times daily, before breakfast and before bedtime 200 each 3     calcitRIOL (ROCALTROL) 0.5 MCG capsule Take 1 capsule (0.5 mcg) by mouth daily 90 capsule 3     cefTAZidime (FORTAZ) 1 GM vial For Allergy Testing in Allergy Clinic Only 1 each 0     ciprofloxacin-dexamethasone (CIPRODEX) otic suspension Place 4 drops Into the left ear three times a week 5.6 mL 6     clotrimazole (LOTRIMIN) 1 % cream Apply topically 2 times daily as needed Reported on 4/25/2017       doxycycline (VIBRAMYCIN) 100 mg vial to attach to  mL bag For allergy testing 10 mL 0     ELIQUIS 2.5 MG tablet Take 1 tablet (2.5 mg) by mouth 2 times daily 180 tablet 3     estradiol (ESTRACE VAGINAL) 0.1 MG/GM cream Place 2 g vaginally twice a week 42.5 g 11     estradiol (VAGIFEM) 10 MCG TABS vaginal tablet Place 1 tablet (10 mcg) vaginally twice a week 8 tablet 11     ezetimibe (ZETIA) 10 MG tablet TAKE 1 TABLET BY MOUTH EVERY DAY OR AS DIRECTED BY DR WOLF 90 tablet 3     ferrous gluconate (FERGON) 324 (38 Fe) MG tablet Take 1 tablet (324 mg) by mouth 3 times daily (with meals) 90 tablet 0     fluconazole (DIFLUCAN) 200-0.9 MG/100ML-% intermittent infusion For allergy testing 100 mL 0     folic acid (FOLVITE) 1 MG tablet Take 1 tablet (1 mg) by mouth daily 100 tablet 3     furosemide (LASIX)  20 MG tablet Take 1 tablet (20 mg) by mouth daily 90 tablet 3     hydrALAZINE (APRESOLINE) 25 MG tablet Take 0.5 tablets (12.5 mg) by mouth 3 times daily as needed , if systolic blood pressure is more than 140 mmHg. 270 tablet 3     Hypromellose (ARTIFICIAL TEARS OP) Apply 1 drop to eye as needed       levothyroxine (SYNTHROID/LEVOTHROID) 175 MCG tablet Take 1 tablet (175 mcg) by mouth daily 90 tablet 3     losartan (COZAAR) 25 MG tablet Take 1 tablet (25 mg) by mouth daily 30 tablet 11     meclizine (ANTIVERT) 25 MG tablet Take 1 tablet (25 mg) by mouth as needed 30 tablet 11     methenamine (HIPREX) 1 g tablet Take 1 tablet (1 g) by mouth At Bedtime 30 tablet 11     metoprolol succinate (TOPROL-XL) 50 MG 24 hr tablet Take 1 tablet (50 mg) by mouth daily 90 tablet 3     metroNIDAZOLE (METROCREAM) 0.75 % cream Apply topically 2 times daily 45 g 5     metroNIDAZOLE (METROGEL) 0.75 % external gel Apply topically 2 times daily Put on face 45 g 3     Multiple Vitamins-Minerals (PRESERVISION AREDS 2) CAPS Take 1 capsule by mouth 2 times daily       nitroFURantoin, macrocrystal-monohydrate, (MACROBID) 100 MG capsule Take 1 capsule (100 mg) by mouth 2 times daily For one week at onset of UTI symptoms 14 capsule 6     omega-3 acid ethyl esters (LOVAZA) 1 g capsule Take 1 capsule (1 g) by mouth 2 times daily 360 capsule 3     order for DME Equipment being ordered: right brace with thumb 1 Device 0     posaconazole (NOXAFIL) 100 MG EC tablet Take 3 tablets (300 mg) by mouth every morning 90 tablet 11     predniSONE (DELTASONE) 20 MG tablet Take 1 tablet (20 mg) by mouth 2 times daily 10 tablet 0     predniSONE (DELTASONE) 5 MG tablet Take 1 tablet (5 mg) by mouth daily 90 tablet 3     RAPAMUNE (BRAND) 1 MG tablet Take 1 tablet (1 mg) by mouth every other day 45 tablet 3     sertraline (ZOLOFT) 100 MG tablet Take 1 tab (100mg) PO daily (Patient taking differently: Take 50 mg by mouth daily ) 90 tablet 0     SUMAtriptan  (IMITREX) 50 MG tablet Take 1 tablet (50 mg) by mouth at onset of headache for migraine repeat after 2 hours if needed. 30 tablet 3     triamcinolone (KENALOG) 0.025 % cream Apply topically 2 times daily To eyelids 15 g 1     triamcinolone (KENALOG) 0.1 % cream Apply topically 2 times daily as needed for irritation       ursodiol (ACTIGALL) 250 MG tablet TAKE 1 TABLET BY MOUTH TWICE A  tablet 3     voriconazole (VFEND) 200 MG tablet Take 1 tablet (200 mg) by mouth 2 times daily 180 tablet 0     Wheat Dextrin (BENEFIBER) POWD 2 teaspoons daily         PAST SURGICAL HISTORY:  Past Surgical History:   Procedure Laterality Date     APPENDECTOMY  1961     BIOPSY       CATARACT IOL, RT/LT      RE12/19/2013, LE12/10/2013 - Toric lenses     CHOLECYSTECTOMY  1991     COLONOSCOPY  3/10/2014    Procedure: COLONOSCOPY;;  Surgeon: Gentry Ramirez MD;  Location: [facility]     ear drum repair       ENDOBRONCHIAL ULTRASOUND FLEXIBLE N/A 9/29/2017    Procedure: ENDOBRONCHIAL ULTRASOUND FLEXIBLE;  Flexible Bronchoscopy, Endobronchial Ultrasound, Transbronchial Needle Aspiration ;  Surgeon: Eden Clinton MD;  Location: [facility]     ENDOSCOPIC RETROGRADE CHOLANGIOPANCREATOGRAM  9/19/2013    Procedure: ENDOSCOPIC RETROGRADE CHOLANGIOPANCREATOGRAM;  Endoscopic Retrograde Cholangiopancreatogram with single balloon enteroscopy, ballon sweep of bile duct;  Surgeon: Brett Membreno MD;  Location: [facility] OR      KNEE SCOPE,MED/LAT MENISECTOMY  8/10/12    Right, partial medial menisectomy only     KNEE SURGERY  1966    R knee     PICC INSERTION  9/18/2013    4fr SL PASV PICC, 40cm (1cm external) in the R basilic vein w/ tip in the low SVC     PICC INSERTION  2/21/2014    5 fr DL BioFlo Navilyst PICC, 46 cm (3 cm external) in the L basilic vein w/ tip in the SVC RA junction.     THYROIDECTOMY  3/2010     TRANSPLANT LIVER RECIPIENT LIVING UNRELATED         ALLERGIES:     Allergies   Allergen Reactions     Fluconazole Hives and Itching      Ciprofloxacin Anxiety and Other (See Comments)     Irregular heart beat     Azithromycin Itching     Benadryl [Diphenhydramine Hcl]      Insomnia      Cellcept Diarrhea     Ciprofloxacin Other (See Comments)     Insomnia, mood lability     Codeine      Psych disturbance     Codeine      Diphenhydramine Other (See Comments)     Doxycycline      Lansoprazole Diarrhea     Levaquin [Levofloxacin] Other (See Comments)     Headache, hyperactivity     Lisinopril Cough     Methotrexate      Sores     Methotrexate      Morphine Sulfate Itching     Mycophenolate Diarrhea     No Clinical Screening - See Comments      Simvastatin Muscle Pain (Myalgia)     severe     Cephalexin Rash     Fever and skin burning     Penicillin G Itching and Rash     Tolectin [Nsaids] Rash     Tramadol Rash       FAMILY HISTORY:  Family History   Problem Relation Age of Onset     Hypertension Mother      Endometrial Cancer Mother      Hyperlipidemia Mother      Prostate Cancer Father      Macular Degeneration Father      Cancer - colorectal Maternal Grandmother         in her 80's, has surgery and removal of part of kidney,  at age 98     Heart Disease Maternal Grandfather          at 98     Glaucoma Maternal Grandfather      Cerebrovascular Disease Paternal Grandmother         in her 80's     Hypertension Paternal Grandmother      Heart Disease Paternal Grandfather         MI     Alzheimer Disease Paternal Grandfather      Allergies Son      Neurologic Disorder Daughter         Migraines     Breast Cancer Other      Anesthesia Reaction No family hx of      Crohn's Disease No family hx of      Ulcerative Colitis No family hx of        SOCIAL HISTORY:  Social History     Tobacco Use     Smoking status: Former Smoker     Packs/day: 1.00     Years: 18.00     Pack years: 18.00     Types: Cigarettes     Last attempt to quit: 1985     Years since quittin.3     Smokeless tobacco: Never Used   Substance Use Topics     Alcohol use: Yes  "    Alcohol/week: 0.0 oz     Comment: rare - \"I toast at weddings\"     Drug use: No       ROS:   A comprehensive 10 point review of systems negative other than as mentioned in HPI.  Exam:  /72 (BP Location: Right arm, Patient Position: Chair, Cuff Size: Adult Large)   Pulse 69   Ht 1.715 m (5' 7.5\")   Wt 85.4 kg (188 lb 4.8 oz)   LMP 06/01/1988 (Approximate)   SpO2 99%   BMI 29.06 kg/m    GENERAL APPEARANCE: alert and no distress  HEENT: no icterus, no xanthelasmas, normal pupil size and reaction, normal palate, mucosa moist, no central cyanosis  NECK: no adenopathy, no asymmetry, masses, or scars, thyroid normal to palpation and no bruits, JVP not elevated  RESPIRATORY: lungs clear to auscultation - no rales, rhonchi or wheezes, no use of accessory muscles, no retractions, respirations are unlabored, normal respiratory rate  CARDIOVASCULAR: regular rhythm, normal S1 with physiologic split S2, no S3 or S4 and no murmur, click or rub, precordium quiet with normal PMI.  ABDOMEN: soft, non tender, bowel sounds normal, non-distended  EXTREMITIES: peripheral pulses normal, no edema  NEURO: alert and oriented to person/place/time, normal speech, gait and affect  SKIN: no ecchymoses, no rashes  PSYCH: normal affect, cooperative    Labs:  Reviewed.     Testing/Procedures:  PULMONARY FUNCTION TESTS:   PFT Latest Ref Rng & Units 4/18/2018   FVC L 2.63   FEV1 L 2.22   FVC% % 80   FEV1% % 88         5/2018 ECHOCARDIOGRAM:   Interpretation Summary     1. The left ventricle is normal in structure, function and size. The visual  ejection fraction is estimated at 60%.  2. The right ventricle is normal in structure, function and size.  3. There is mild to moderate (1-2+) mitral regurgitation.  4. There is no atrial shunt seen. A contrast injection (Bubble Study) was  performed that was negative for flow across the interatrial septum.     No significant change from echo 7/2015.    Assessment and Plan:   Ms. Thompson is a " 69 year old female who has a past medical history significant for HTN,HLD, PAF (CHADSVASC 5 on Eliquis), CKD, PBC s/p OTL , CVA, Sjogren's, osteoporosis, and vasovagal syncope. She was diagnosed with PAF in  when she was hospitalized for coccidioidomycosis.  Echo and NM Alanis scan at that time were normal. Anticoagulation was discussed with her and she elected not to pursue at that time. She subsequently had 2 more episodes of palpitations associated with SOB and anxiety. Longest episode lasting 3 hours. She saw Dr. Gilmore in 2017 and agreed to start Eliquis and Toprol XL at that time. She underwent colonoscopy in 2017 which was complicated by colon perforation necessitating temporarily holding her Eliquis. She did eventually resume Eliquis and has been doing well without bleeding issues. Of note she was on statin in the past which caused muscle aches and bone pain and has subsequently stopped.  Echo from 2018 showed LVEF 60-% with mild/moderate MR. She presents today for follow up. She reports feeling well. She states she felt she had a 30 minute episode of AF in 2019 that resolved on its own. She otherwise denies any chest pain/pressures, dizziness, lightheadedness, worsening shortness of breath, leg/ankle swelling, PND, orthopnea, palpitations, or syncopal symptoms. Presenting 12 lead ECG shows NSR Vent Rate 67 bpm,  ms, QRS 88 ms, QTc 429 ms. Current cardiac medications include: Zetia, Lasix, Eliquis, Toprol XL, and Hydralazine.    Paroxysmal Atrial Fibrillation:  We discussed in detail with the patient management/treatment options for Robert includin. Stroke Prophylaxis:  CHADSVASC=+age, +gender, +HTN, ++CVA  5, corresponding to a 6.7% annual stroke / systemic emolism event rate. indicating need for long term oral anticoagulation.  She is appropriately on Eliquis. No bleeding issues.   2. Rate Control: Continue Toprol XL.   3. Rhythm Control: Cardioversion, She is doing well with low  burden. No compelling indication for rhythm control at this time.     4. Risk Factor Management: Continue Zetia, maintain good BP control, and RASHIDA evaluation as indicated.      Follow up in 1 year.     The patient states understanding and is agreeable with plan.   LIZ Og CNP  Pager: [phone]  CC  SELF, REFERRED

## 2019-08-07 NOTE — LETTER
8/7/2019      RE: Luz Thompson  [address]  [place] [address]       Dear Colleague,    Thank you for the opportunity to participate in the care of your patient, Luz Thompson, at the [facility]. Please see a copy of my visit note below.    Cardiology Clinic Note            HPI:  Luz Thompson is a 69 year old with a history of paroxysmal a-fib on apixiban, hypertension, hyperlipidemia and hypertriglyceridemia w/statin intolerance, CKD and PBC s/p liver transplant in 2002 who presents for routine follow-up of lipid management. The patient was referred to Dr. Reyna in 6/2017 for lipid management. At the time she was noted to have an elevated LDL (198), total cholesterol (322), and triglyceride (448) levels. She did not tolerate simvastatin or pravastatin due to myalgias. She was started on Zetia 10 mg which she tolerated but lipids remained significantly elevated. In 10/2018 she was started on Praluent 75 mg Q 14 days. Most recent labs 5/2019 shows cholesterol levels at goal.  Ms. Thompson states that she has had a difficult year due to the passing of her significant other. She is currently living with her cousin, but is planning to move to [place] in September. She is not very active at the moment due to arthritis. She has not experienced any recent chest pain, shortness of breath, orthopnea or PND. She reports a 13 lb weight gain due to inactivity and also fluid retention. She reports lower extremity swelling which has been improving since her nephrologist increased her lasix to 20 mg daily. She denies recent lightheadedness, palpitations or syncope.  In regards to diet, over the past few months she was eating more pre-packed foods because she doesn't have a full kitchen, but recently started preparing her own food again and is eating healthier. Her home blood pressures have been running high 150-160/80-90 and she has  "needed PRN hydralazine almost daily for the past 1-2 weeks. Her nephrologist recently started her on losartan 25 mg daily which she has not yet taken because she wanted our input first.  She is complicant with her current cardiac medications including Eliquis 2.5 mg BID, metoprolol XL 50 mg daily, lasix 20 mg, hydralazine 25 mg PRN, Zetia 10 mg, Lovaza 1 mg BID, praluent 150 Q 14 days.     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      Diverticulosis of sigmoid colon 12/21/2013     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     Received Rituxan during Summer of 2016     H/O esophageal varices      Hearing loss      Heart murmur 4/4/2012     History of DVT (deep vein thrombosis)      History of [facility] fever      History of thyroid cancer 9/25/2012     Hyperlipidemia 4/10/2012    Says that she does not have it anymore, not on meds     Hyperlipidemia LDL goal <70      Hypertension goal BP (blood pressure) < 140/80 11/06/2013     Hypertriglyceridemia      Liver replaced by transplant (H) 10/17/2011    Dr. 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    alirocumab (PRALUENT) 150 MG/ML injectable pen Inject 1 mL (150 mg) Subcutaneous every 14 days   ampicillin (OMNIPEN) 250 MG injection For allergy testing   azithromycin (ZITHROMAX) 500 MG vial For allergy testing   benzoyl peroxide 5 % external liquid Use daily as directed   blood glucose monitoring (NO BRAND SPECIFIED) meter device kit Use to test blood sugar 2times daily or as directed.   blood glucose monitoring (NO BRAND SPECIFIED) test strip Use to test blood sugar 2 times daily, before breakfast and before bedtime   calcitRIOL (ROCALTROL) 0.5 MCG capsule Take 1 capsule (0.5 mcg) by mouth daily   cefTAZidime (FORTAZ) 1 GM vial For Allergy Testing in Allergy Clinic Only   ciprofloxacin-dexamethasone (CIPRODEX) otic suspension Place 4 drops Into the left ear three times a week   clotrimazole (LOTRIMIN) 1 % cream Apply topically 2 times daily as needed Reported on 4/25/2017   doxycycline (VIBRAMYCIN) 100 mg vial to attach to  mL bag For allergy testing   ELIQUIS 2.5 MG tablet Take 1 tablet (2.5 mg) by mouth 2 times daily   estradiol (ESTRACE VAGINAL) 0.1 MG/GM cream Place 2 g vaginally twice a week   estradiol (VAGIFEM) 10 MCG TABS vaginal tablet Place 1 tablet (10 mcg) vaginally twice a week   ezetimibe (ZETIA) 10 MG tablet TAKE 1 TABLET BY MOUTH EVERY DAY OR AS DIRECTED BY DR WOLF   ferrous gluconate (FERGON) 324 (38 Fe) MG tablet Take 1 tablet (324 mg) by mouth 3 times daily (with meals)   fluconazole (DIFLUCAN) 200-0.9 MG/100ML-% intermittent infusion For allergy testing   folic acid (FOLVITE) 1 MG tablet Take 1 tablet (1 mg) by mouth daily   furosemide (LASIX) 20 MG tablet TAKE 1/2 TABLET BY MOUTH EVERY DAY   hydrALAZINE (APRESOLINE) 25 MG tablet Take 0.5 tablets (12.5 mg) by mouth 3 times daily as needed , if systolic blood pressure  is more than 140 mmHg.   Hypromellose (ARTIFICIAL TEARS OP) Apply 1 drop to eye as needed   levothyroxine (SYNTHROID/LEVOTHROID) 150 MCG tablet TAKE 1.5 TABLETS BY MOUTH ON . AND 1 TABLET DAILY, THE OTHER DAYS OF THE WEEK.   meclizine (ANTIVERT) 25 MG tablet Take 1 tablet (25 mg) by mouth as needed   methenamine (HIPREX) 1 g tablet Take 1 tablet (1 g) by mouth At Bedtime   metoprolol succinate (TOPROL-XL) 50 MG 24 hr tablet Take 1 tablet (50 mg) by mouth daily   metroNIDAZOLE (METROCREAM) 0.75 % cream Apply topically 2 times daily   metroNIDAZOLE (METROGEL) 0.75 % external gel Apply topically 2 times daily Put on face   Multiple Vitamins-Minerals (PRESERVISION AREDS 2) CAPS Take 1 capsule by mouth 2 times daily   nitroFURantoin, macrocrystal-monohydrate, (MACROBID) 100 MG capsule Take 1 capsule (100 mg) by mouth 2 times daily For one week at onset of UTI symptoms   omega-3 acid ethyl esters (LOVAZA) 1 g capsule Take 1 capsule (1 g) by mouth 2 times daily   order for DME Equipment being ordered: right brace with thumb   posaconazole (NOXAFIL) 100 MG EC tablet Take 3 tablets (300 mg) by mouth every morning   predniSONE (DELTASONE) 20 MG tablet Take 1 tablet (20 mg) by mouth 2 times daily   predniSONE (DELTASONE) 5 MG tablet Take 1 tablet (5 mg) by mouth daily   RAPAMUNE (BRAND) 1 MG tablet Take 1 tablet (1 mg) by mouth every other day   sertraline (ZOLOFT) 100 MG tablet Take 1 tab (100mg) PO daily (Patient taking differently: Take 50 mg by mouth daily )   SUMAtriptan (IMITREX) 50 MG tablet Take 1 tablet (50 mg) by mouth at onset of headache for migraine repeat after 2 hours if needed.   [] tobramycin (TOBREX) 0.3 % ophthalmic ointment Place 0.5 inches into both eyes 2 times daily for 14 days   triamcinolone (KENALOG) 0.025 % cream Apply topically 2 times daily To eyelids   triamcinolone (KENALOG) 0.1 % cream Apply topically 2 times daily as needed for irritation   ursodiol (ACTIGALL) 250 MG tablet TAKE 1  TABLET BY MOUTH TWICE A DAY   voriconazole (VFEND) 200 MG tablet Take 1 tablet (200 mg) by mouth 2 times daily   Wheat Dextrin (BENEFIBER) POWD 2 teaspoons daily     Current Facility-Administered Medications on File Prior to Visit:  ceFAZolin (ANCEF) injection 500 mg   cefTRIAXone (ROCEPHIN) injection 250 mg   penicillin g potassium [id number] unit vial INTRADERMAL     Allergies:    Allergies   Allergen Reactions     Fluconazole Hives and Itching     Ciprofloxacin Anxiety and Other (See Comments)     Irregular heart beat     Azithromycin Itching     Benadryl [Diphenhydramine Hcl]      Insomnia      Cellcept Diarrhea     Ciprofloxacin Other (See Comments)     Insomnia, mood lability     Codeine      Psych disturbance     Codeine      Diphenhydramine Other (See Comments)     Doxycycline      Lansoprazole Diarrhea     Levaquin [Levofloxacin] Other (See Comments)     Headache, hyperactivity     Lisinopril Cough     Methotrexate      Sores     Methotrexate      Morphine Sulfate Itching     Mycophenolate Diarrhea     No Clinical Screening - See Comments      Simvastatin Muscle Pain (Myalgia)     severe     Cephalexin Rash     Fever and skin burning     Penicillin G Itching and Rash     Tolectin [Nsaids] Rash     Tramadol Rash       Family and social history:    Family History   Problem Relation Age of Onset     Hypertension Mother      Endometrial Cancer Mother      Hyperlipidemia Mother      Prostate Cancer Father      Macular Degeneration Father      Cancer - colorectal Maternal Grandmother         in her 80's, has surgery and removal of part of kidney,  at age 98     Heart Disease Maternal Grandfather          at 98     Glaucoma Maternal Grandfather      Cerebrovascular Disease Paternal Grandmother         in her 80's     Hypertension Paternal Grandmother      Heart Disease Paternal Grandfather         MI     Alzheimer Disease Paternal Grandfather      Allergies Son      Neurologic Disorder Daughter          "Migraines     Breast Cancer Other      Anesthesia Reaction No family hx of      Crohn's Disease No family hx of      Ulcerative Colitis No family hx of        Social History     Socioeconomic History     Marital status:      Spouse name: Robbin Thompson     Number of children: 4     Years of education: 20     Highest education level: Not on file   Occupational History     Occupation: Guardian Conservator      Employer: [employer]     Comment: social work     Employer: SELF   Social Needs     Financial resource strain: Not on file     Food insecurity:     Worry: Not on file     Inability: Not on file     Transportation needs:     Medical: Not on file     Non-medical: Not on file   Tobacco Use     Smoking status: Former Smoker     Packs/day: 1.00     Years: 18.00     Pack years: 18.00     Types: Cigarettes     Last attempt to quit: 1985     Years since quittin.3     Smokeless tobacco: Never Used   Substance and Sexual Activity     Alcohol use: Yes     Alcohol/week: 0.0 oz     Comment: rare - \"I toast at weddings\"     Drug use: No     Sexual activity: Yes     Partners: Male     Birth control/protection: Post-menopausal   Lifestyle     Physical activity:     Days per week: Not on file     Minutes per session: Not on file     Stress: Not on file   Relationships     Social connections:     Talks on phone: Not on file     Gets together: Not on file     Attends Mosque service: Not on file     Active member of club or organization: Not on file     Attends meetings of clubs or organizations: Not on file     Relationship status: Not on file     Intimate partner violence:     Fear of current or ex partner: Not on file     Emotionally abused: Not on file     Physically abused: Not on file     Forced sexual activity: Not on file   Other Topics Concern     Parent/sibling w/ CABG, MI or angioplasty before 65F 55M? Not Asked      Service Not Asked     Blood Transfusions Not Asked     " "Caffeine Concern Not Asked     Occupational Exposure Not Asked     Hobby Hazards Not Asked     Sleep Concern Not Asked     Stress Concern Not Asked     Weight Concern Not Asked     Special Diet Not Asked     Back Care Not Asked     Exercise Yes     Comment: cardio and strengthing     Bike Helmet Not Asked     Seat Belt Not Asked     Self-Exams Not Asked   Social History Narrative    She is retired. [place] of [place]. 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     Review of Systems:  Skin: No skin rash or ulcers.  Eyes: No red eye.  Ears/Nose/Throat: No ear discharge, nasal congestion, sore throat or dysphagia.  Respiratory: No cough or hemoptysis.  Cardiovascular: See HPI.    Gastrointestinal: No abdominal pain, nausea, vomiting, hematemesis or melena.  Genitourinary: No increased frequency or urgency of urine. No dysuria or hematuria.  Musculoskeletal: No polyarthralgia or myalgias.  Neurologic: No headaches, seizure or focal weakness.  Psychiatric: No hallucinations.  Hematologic/Lymphatic/Immunologic: No bleeding tendency.  Endocrine: No heat or cold intolerance, abnormal facial hair or alopecia.    Vital signs:  /72 (BP Location: Right arm, Patient Position: Chair, Cuff Size: Adult Large)   Pulse 69   Ht 1.715 m (5' 7.5\")   Wt 85.4 kg (188 lb 4.8 oz)   LMP 06/01/1988 (Approximate)   SpO2 99%   BMI 29.06 kg/m      Wt Readings from Last 2 Encounters:   07/31/19 83 kg (183 lb)   07/09/19 83 kg (183 lb)       Physical Exam:  Gen: NAD.    HEENT: No conjunctival pallor or scleral icterus, MMM. Clear oropharynx.    Neck: No JVD. No thyroid enlargement or cervical adenopathy.    Chest: Clear to auscultation bilaterally.    CV: Normal first and second heart sounds. 2/6 CHRISTOPHER  Abdomen: Soft, non-tender, non-distended, BS+.  Ext: No edema. Warm and well perfused with normal capillary refill.    Skin: No " skin rash or ulcers.  Neuro: alert, oriented and appropriately conversant.    Psych: Normal affect and speech.    Labs:    LDL Cholesterol Calculated   Date Value Ref Range Status   05/20/2019 91 <100 mg/dL Final     Comment:     Desirable:       <100 mg/dl   12/28/2018 110 mg/dL Final     Recent Labs   Lab Test  05/18/18   0731  10/05/17   0907   09/18/15   0954  05/08/14   0806   CHOL  265*  231*   < >  181  159   HDL  49*  50   < >  63  48*   LDL  Cannot estimate LDL when triglyceride exceeds 400 mg/dL  150*  Cannot estimate LDL when triglyceride exceeds 400 mg/dL  135*   < >  84  88   TRIG  557*  419*   < >  172*  112   CHOLHDLRATIO   --    --    --   2.9  3.3    < > = values in this interval not displayed.       Diagnostics:    Echo 5/2018:  1. The left ventricle is normal in structure, function and size. The visual  ejection fraction is estimated at 60%.  2. The right ventricle is normal in structure, function and size.  3. There is mild to moderate (1-2+) mitral regurgitation.  4. There is no atrial shunt seen. A contrast injection (Bubble Study) was  performed that was negative for flow across the interatrial septum.    Assessment and Plan:  Luz Thompson is a 68 year old with a past medical history significant for paroxysmal a-fib on apixiban, hypertension, hyperlipidemia and hypertriglyceridemia with statin intolerance, CKD and PBC s/p liver transplant in 2002 who presents for routine follow-up.  Hyperlipidemia and hypertriglyceridemia: Lipids are currently at goal with LDL 91 and trig 146 in 5/2019. We discussed the importance of heart healthy diet and daily exercise. Plan to continue zetia 10 mg, lovaza 1 mg BID and praluent 150 mg Q 14 days. She will have lipids drawn at her earliest convenience then annually thereafter.   Hypertension: Home blood pressures are elevated, agree with nephrologist to start losartan 25 mg daily. Continue metoprolol 50 mg XL and hydralazine PRN.  Paraoxysmal a-fib: No  recent episodes of AF. Continue metoprolol XL 50 mg daily for rate control and Eliquis 2.5 mg BID for stroke prophylaxis     Follow-up: RTC in 1 year.          Please do not hesitate to contact me if you have any questions/concerns.     Sincerely,     Britt Oswald NP

## 2019-08-07 NOTE — TELEPHONE ENCOUNTER
[facility] Call Center    Phone Message    May a detailed message be left on voicemail: yes    Reason for Call: Medication Question or concern regarding medication   Prescription Clarification  Name of Medication: hydrALAZINE (APRESOLINE) 25 MG tablet,omega-3 acid ethyl esters (LOVAZA) 1 g capsule  Prescribing Provider:    Pharmacy: [id number] [id number] [facility]   What on the order needs clarification? Faviola needs clarification on both the orders for the medication. Please call Faviola back to discuss in greater detail           Action Taken: Message routed to:  [facility] ([facility] cardiology

## 2019-08-08 ENCOUNTER — THERAPY VISIT (OUTPATIENT)
Dept: OCCUPATIONAL THERAPY | Facility: CLINIC | Age: 69
End: 2019-08-08
Attending: PREVENTIVE MEDICINE
Payer: MEDICARE

## 2019-08-08 ENCOUNTER — TELEPHONE (OUTPATIENT)
Dept: CARDIOLOGY | Facility: CLINIC | Age: 69
End: 2019-08-08

## 2019-08-08 DIAGNOSIS — I10 HYPERTENSION GOAL BP (BLOOD PRESSURE) < 140/80: Primary | ICD-10-CM

## 2019-08-08 DIAGNOSIS — M18.11 PRIMARY OSTEOARTHRITIS OF FIRST CARPOMETACARPAL JOINT OF RIGHT HAND: ICD-10-CM

## 2019-08-08 DIAGNOSIS — E78.1 HYPERTRIGLYCERIDEMIA: ICD-10-CM

## 2019-08-08 DIAGNOSIS — M79.644 PAIN OF RIGHT THUMB: ICD-10-CM

## 2019-08-08 DIAGNOSIS — M18.11 PRIMARY OSTEOARTHRITIS OF FIRST CARPOMETACARPAL JOINT OF RIGHT HAND: Primary | ICD-10-CM

## 2019-08-08 LAB — INTERPRETATION ECG - MUSE: NORMAL

## 2019-08-08 PROCEDURE — 97760 ORTHOTIC MGMT&TRAING 1ST ENC: CPT | Mod: GO | Performed by: OCCUPATIONAL THERAPIST

## 2019-08-08 PROCEDURE — 97165 OT EVAL LOW COMPLEX 30 MIN: CPT | Mod: GO | Performed by: OCCUPATIONAL THERAPIST

## 2019-08-08 PROCEDURE — 97530 THERAPEUTIC ACTIVITIES: CPT | Mod: GO | Performed by: OCCUPATIONAL THERAPIST

## 2019-08-08 NOTE — PROGRESS NOTES
Hand Therapy Initial Evaluation    Current Date:  8/8/2019  Referring Physician: Dr. Griffith    Diagnosis: Right thumb pain  DOI: 7/31/19 (Date of order)    Subjective:  Luz Thompson is a 69 year old left hand dominant female.    Patient reports symptoms of pain, stiffness/loss of motion and weakness/loss of strength of the right thumb which occurred due to OA. Since onset symptoms are Gradually getting worse.  Special tests:  x-ray.  Previous treatment: OTC thumb spica, getting a cortisone injection next week.    General health as reported by patient is fair.  Pertinent medical history includes:Anemia, Cancer, Depression, Diabetes, High Blood Pressure, Kidney Disease, Numbness/Tingling, Osteoarthritis, Osteoporosis, Sleep Disorder/Apnea, Stroke, Thyroid Problems  Medical allergies:none.  Surgical history: cancer: thyroid, other: liver transplant 2002, please refer to medical chart for additional surgeries.  Medication history: Heparin/Coumadin, High Blood Pressure, Steroids, Thyroid.    Occupational Profile Information:  Current occupation is retired  Prior functional level:  no limitations  Barriers include:none  Mobility: No difficulty  Transportation: drives  Leisure activities/hobbies: read, knit, play cards    Upper Extremity Functional Index Score:  SCORE:   Column Totals: /80: 30   (A lower score indicates greater disability.)    Objective:  Pain Level (Scale 0-10):   8/8/2019   At Rest 2/10   With Use 8/10     Pain Description:  Date 8/8/2019   Location Right thumb   Pain Quality Aching, Dull, Sharp and Shooting   Frequency intermittent     Pain is worst  daytime   Exacerbated by  gripping, pinching, clapping   Relieved by Splint, lidocaine patches, heat   Progression Gradually worsening     ROM:  Thumb 8/8/2019 8/8/2019   AROM(PROM) Left Right   MP 50 50   IP 55 55   RAbd 53 30   PAbd 50 40   Retropulsion     Kapandji Opposition Scale (0-10/10) 9/10 9/10+       STRENGTH:   (Measured in pounds)  Pain  Free /Pinch Test only  Strength   Painfree (Measured in pounds)  Pain Report:  + mild    ++ moderate    +++ severe    8/8/2019 8/8/2019   Trials Left Right   1  2  3 52  44  43 40  36  32   Average 46 36     Lat Pinch 8/8/2019 8/8/2019   Trials Left Right   1  2  3 11 7   Average       3 Pt Pinch 8/8/2019 8/8/2019   Trials Left Right   1  2  3 14 9   Average       Provocative Tests:  Pain Report:  - none    + mild    ++ moderate    +++ severe    8/8/2019       Right      CMC Grind test +++      CMC Joint line/pain +++      Finkelstein's -      Crepitus present +          Thumb Observation/Appearance:  Key: + = present/ - = not observed   Date: 8/8/2019 8/8/2019     Right Left   Shoulder deformity present over CMC/Dorsalradial subluxation + -   Volar subluxation present -   -   Edema over the CMC joint -   -   Noted collapse of MP into hyperextension during pinch -   +   Tenderness at CMC +   -     Assessment:  Patient presents with symptoms consistent with diagnosis of CMC thumb arthritis, with conservativeintervention.    Patient s limitations or Problem List includes:  Pain, Decreased ROM/motion, weakness, decreased stability of the CMC joint, decreased  and pinch strength of the thumb which interferes with patients ability to perform  self care, dressing and home maintenance as compared to previous level of function.    Rehab Potential:  Good - Return to full activity, some limitations    Patient will benefit from skilled Occupational Therapy to increase overall strength,  strength, pinch strength and stability of thumb and decrease pain to return to previous activity level and resume normal daily tasks and to reach their rehab potential.    Barriers to Learning:  No barrier,      Communication Issues:  Patient appears to be able to clearly communicate and understand verbal and written communication and follow directions correctly. Patient has hearing aids    Chart Review: Brief history including  review of medical and/or therapy records relating to the presenting problem and Simple history review with patient    Identified Performance Deficits: bathing/showering, dressing, hygiene and grooming, home establishment and management, meal preparation and cleanup, sleep and leisure activities    Assessment of Occupational Performance:  5 or more Performance Deficits    Clinical Decision Making (Complexity): Low complexity    Treatment Explanation:  The following has been discussed with the patient:  RX ordered/plan of care  Anticipated outcomes  Possible risks and side effects    Treatment Plan:    Frequency:  2 X a month, once daily  Duration:  for 3 weeks    Modalities:  Paraffin  Therapeutic Exercise:  AROM, AAROM, PROM, Isotonics, Isometrics and Stabilization  Neuromuscular re-education:  Kinesiotaping  Manual Techniques:  Myofascial release  Orthotic Fabrication:  Static orthosis and Hand based orthosis  Education/Joint Protection: anatomy of CMC , joint protection principles, adaptive equipment as needed.    Discharge Plan:  Achieve all LTG  Warren in home treatment program.  Reach maximal therapeutic benefit.  Please refer to the daily flowsheet for treatment today and total treatment time.    Home Program:  Orthotic Support: CMC stabilization functional orthosis during the daytime, Hand/Forearm Based Thumb Spica Orthosis, if required  Activity: incorporate joint protection into daily functional activities, adaptive equipment as needed.  Exercise:   Stabilization Program 3 times a day  CMC Thumb Stabilization Home Exercises:   X = date added to HEP  DATE      Web space release    C position    1st DI    Piano isometric    Skull Rock/Jog    Self CMC Mobs: Traction/chest/etc.    Issued arthritis handout and discussed content    Next visit:   Trial of paraffin and heat mitt  Adjust orthosis as indicated  Thumb stabilization exericses

## 2019-08-08 NOTE — TELEPHONE ENCOUNTER
Attempted to call the pharmacy but they are not open for business until 10 am. Will try and call back at that time.

## 2019-08-08 NOTE — LETTER
DEPARTMENT OF HEALTH AND HUMAN SERVICES  [facility] & MEDICAID SERVICES    PLAN/UPDATED PLAN OF PROGRESS FOR OUTPATIENT REHABILITATION    PATIENTS NAME:  Luz Thompson     : 1950    PROVIDER NUMBER:  [id number]    HICN:  [id number]     PROVIDER NAME: [facility]    MEDICAL RECORD NUMBER: [id number]     START OF CARE DATE:    SOC Date: 19     TYPE:  OT    PRIMARY/TREATMENT DIAGNOSIS: (Pertinent Medical Diagnosis)     Primary osteoarthritis of first carpometacarpal joint of right hand  Pain of right thumb    VISITS FROM START OF CARE:  Rxs Used: 1     Hand Therapy Initial Evaluation  Current Date:  2019  Referring Physician: Dr. Griffith  Diagnosis: Right thumb pain  DOI: 19 (Date of order)    Subjective:  Luz Thompson is a 69 year old left hand dominant female.    Patient reports symptoms of pain, stiffness/loss of motion and weakness/loss of strength of the right thumb which occurred due to OA. Since onset symptoms are Gradually getting worse.  Special tests:  x-ray.  Previous treatment: OTC thumb spica, getting a cortisone injection next week.  General health as reported by patient is fair.      Pertinent medical history includes:Anemia, Cancer, Depression, Diabetes, High Blood Pressure, Kidney Disease, Numbness/Tingling, Osteoarthritis, Osteoporosis, Sleep Disorder/Apnea, Stroke, Thyroid Problems  Medical allergies:none. Surgical history: cancer: thyroid, other: liver transplant , please refer to medical chart for additional surgeries. Medication history: Heparin/Coumadin, High Blood Pressure, Steroids, Thyroid.    Occupational Profile Information:  Current occupation is retired  Prior functional level:  no limitations  Barriers include:none  Mobility: No difficulty  Transportation: drives  Leisure activities/hobbies: read, knit, play cards  Upper Extremity Functional Index Score:  SCORE:   Column Totals: /80: 30   (A lower score indicates greater  disability.)    Objective:  Pain Level (Scale 0-10):   8/8/2019   At Rest 2/10   With Use 8/10     Pain Description:  Date 8/8/2019   Location Right thumb   Pain Quality Aching, Dull, Sharp and Shooting   Frequency intermittent     Pain is worst  daytime   Exacerbated by  gripping, pinching, clapping   Relieved by Splint, lidocaine patches, heat   Progression Gradually worsening     ROM:  Thumb 8/8/2019 8/8/2019   AROM(PROM) Left Right   MP 50 50   IP 55 55   RAbd 53 30   PAbd 50 40   Retropulsion     Kapandji Opposition Scale (0-10/10) 9/10 9/10+     STRENGTH:   (Measured in pounds)  Pain Free /Pinch Test only  Strength   Painfree (Measured in pounds)  Pain Report:  + mild    ++ moderate    +++ severe    8/8/2019 8/8/2019   Trials Left Right   1  2  3 52  44  43 40  36  32   Average 46 36     Lat Pinch 8/8/2019 8/8/2019   Trials Left Right   1  2  3 11 7   Average       3 Pt Pinch 8/8/2019 8/8/2019   Trials Left Right   1  2  3 14 9   Average       Provocative Tests:  Pain Report:  - none    + mild    ++ moderate    +++ severe    8/8/2019       Right      CMC Grind test +++      CMC Joint line/pain +++      Finkelstein's -      Crepitus present +          Thumb Observation/Appearance:  Key: + = present/ - = not observed   Date: 8/8/2019 8/8/2019     Right Left   Shoulder deformity present over CMC/Dorsalradial subluxation + -   Volar subluxation present -   -   Edema over the CMC joint -   -   Noted collapse of MP into hyperextension during pinch -   +   Tenderness at CMC +   -     Assessment:  Patient presents with symptoms consistent with diagnosis of CMC thumb arthritis, with conservativeintervention.    Patient s limitations or Problem List includes:  Pain, Decreased ROM/motion, weakness, decreased stability of the CMC joint, decreased  and pinch strength of the thumb which interferes with patients ability to perform  self care, dressing and home maintenance as compared to previous level of  function.    Rehab Potential:  Good - Return to full activity, some limitations    Patient will benefit from skilled Occupational Therapy to increase overall strength,  strength, pinch strength and stability of thumb and decrease pain to return to previous activity level and resume normal daily tasks and to reach their rehab potential.    Barriers to Learning:  No barrier,    Communication Issues:  Patient appears to be able to clearly communicate and understand verbal and written communication and follow directions correctly. Patient has hearing aids  Chart Review: Brief history including review of medical and/or therapy records relating to the presenting problem and Simple history review with patient    Identified Performance Deficits: bathing/showering, dressing, hygiene and grooming, home establishment and management, meal preparation and cleanup, sleep and leisure activities    Assessment of Occupational Performance:  5 or more Performance Deficits    Clinical Decision Making (Complexity): Low complexity    Treatment Explanation:  The following has been discussed with the patient:  RX ordered/plan of care  Anticipated outcomes  Possible risks and side effects    Treatment Plan:  Frequency:  2 X a month, once daily  Duration:  for 3 weeks  Modalities:  Paraffin  Therapeutic Exercise:  AROM, AAROM, PROM, Isotonics, Isometrics and Stabilization  Neuromuscular re-education:  Kinesiotaping  Manual Techniques:  Myofascial release  Orthotic Fabrication:  Static orthosis and Hand based orthosis  Education/Joint Protection: anatomy of CMC , joint protection principles, adaptive equipment as needed.    Discharge Plan:  Achieve all LTG  Richardson in home treatment program.  Reach maximal therapeutic benefit.  Please refer to the daily flowsheet for treatment today and total treatment time.    Home Program:  Orthotic Support: CMC stabilization functional orthosis during the daytime, Hand/Forearm Based Thumb Spica  "Orthosis, if required  Activity: incorporate joint protection into daily functional activities, adaptive equipment as needed.  Exercise:   Stabilization Program 3 times a day  CMC Thumb Stabilization Home Exercises:   X = date added to HEP  DATE      Web space release    C position    1st DI    Piano isometric    Skull Rock/Jog    Self CMC Mobs: Traction/chest/etc.    Issued arthritis handout and discussed content    Next visit:   Trial of paraffin and heat mitt  Adjust orthosis as indicated  Thumb stabilization exericses    Caregiver Signature/Credentials ______________________________ Date ________       Treating Provider: Cindy PARK/L,CHT      I have reviewed and certified the need for these services and plan of treatment while under my care.      PHYSICIAN'S SIGNATURE:   _____________________________________  Date___________    Samson Griffith MD    Certification period: Beginning of Cert date period: 08/08/19 End of Cert period date: 11/05/19     Functional Level Progress Report: Please see attached \"Goal Flow sheet for Functional level.\"    ___X_____ Continue Services or       ________ DC Services                Service dates: SOC Date: 08/08/19  to present                                                                     "

## 2019-08-08 NOTE — TELEPHONE ENCOUNTER
Health Call Center    Phone Message    May a detailed message be left on voicemail: yes    Reason for Call: Medication Question or concern regarding medication   Prescription Clarification  Name of Medication: omega-3 acid ethyl esters (LOVAZA) 1 g capsule & hydrALAZINE (APRESOLINE) 25 MG tablet  Prescribing Provider: Britt Oswald   Pharmacy: [facility]   What on the order needs clarification? The directions for the Lovaza and Hydralazine don't match the quantities. The pharmacy is wanting to confirm if these medications should be filled for a 30 day supply.          Action Taken: Message routed to:  [facility] ([facility]): Cardiology

## 2019-08-09 ENCOUNTER — TELEPHONE (OUTPATIENT)
Dept: CARDIOLOGY | Facility: CLINIC | Age: 69
End: 2019-08-09

## 2019-08-09 ENCOUNTER — ALLIED HEALTH/NURSE VISIT (OUTPATIENT)
Dept: EDUCATION SERVICES | Facility: CLINIC | Age: 69
End: 2019-08-09
Payer: MEDICARE

## 2019-08-09 ENCOUNTER — OFFICE VISIT (OUTPATIENT)
Dept: INFECTIOUS DISEASES | Facility: CLINIC | Age: 69
End: 2019-08-09
Attending: INTERNAL MEDICINE
Payer: MEDICARE

## 2019-08-09 ENCOUNTER — TELEPHONE (OUTPATIENT)
Dept: DERMATOLOGY | Facility: CLINIC | Age: 69
End: 2019-08-09

## 2019-08-09 VITALS
TEMPERATURE: 98 F | SYSTOLIC BLOOD PRESSURE: 172 MMHG | BODY MASS INDEX: 28.45 KG/M2 | OXYGEN SATURATION: 98 % | HEART RATE: 76 BPM | HEIGHT: 68 IN | DIASTOLIC BLOOD PRESSURE: 79 MMHG | WEIGHT: 187.7 LBS

## 2019-08-09 DIAGNOSIS — Z79.899 HIGH RISK MEDICATION USE: ICD-10-CM

## 2019-08-09 DIAGNOSIS — E11.22 TYPE 2 DIABETES MELLITUS WITH STAGE 3 CHRONIC KIDNEY DISEASE, WITHOUT LONG-TERM CURRENT USE OF INSULIN (H): Primary | ICD-10-CM

## 2019-08-09 DIAGNOSIS — Z88.9 DRUG ALLERGY: ICD-10-CM

## 2019-08-09 DIAGNOSIS — B38.2 COCCIDIOIDAL PNEUMONITIS (H): ICD-10-CM

## 2019-08-09 DIAGNOSIS — N18.30 TYPE 2 DIABETES MELLITUS WITH STAGE 3 CHRONIC KIDNEY DISEASE, WITHOUT LONG-TERM CURRENT USE OF INSULIN (H): Primary | ICD-10-CM

## 2019-08-09 DIAGNOSIS — E11.9 TYPE 2 DIABETES, HBA1C GOAL < 7% (H): ICD-10-CM

## 2019-08-09 DIAGNOSIS — Z94.4 LIVER REPLACED BY TRANSPLANT (H): ICD-10-CM

## 2019-08-09 DIAGNOSIS — B27.00 EBV (EPSTEIN-BARR VIRUS) VIREMIA: Primary | ICD-10-CM

## 2019-08-09 PROCEDURE — G0463 HOSPITAL OUTPT CLINIC VISIT: HCPCS | Mod: ZF

## 2019-08-09 RX ORDER — AMPICILLIN 250 MG/1
INJECTION, POWDER, FOR SOLUTION INTRAMUSCULAR; INTRAVENOUS
Qty: 1 EACH | Refills: 0 | COMMUNITY
Start: 2019-08-09 | End: 2019-08-27

## 2019-08-09 RX ORDER — VORICONAZOLE 200 MG/1
200 TABLET, FILM COATED ORAL 2 TIMES DAILY
Qty: 180 TABLET | Refills: 0 | Status: ON HOLD | OUTPATIENT
Start: 2019-08-09 | End: 2019-08-31

## 2019-08-09 RX ORDER — LANCETS
EACH MISCELLANEOUS
Qty: 102 EACH | Refills: 11 | Status: SHIPPED | OUTPATIENT
Start: 2019-08-09 | End: 2023-06-15

## 2019-08-09 ASSESSMENT — MIFFLIN-ST. JEOR: SCORE: 1411.96

## 2019-08-09 ASSESSMENT — PAIN SCALES - GENERAL: PAINLEVEL: NO PAIN (0)

## 2019-08-09 NOTE — PATIENT INSTRUCTIONS
1.  Test your blood sugar daily as directed by your doctor.  Your blood sugar goals are: Before meals:  mg/dl 2 hours after a meal: <180  2.  Make good food choices and try to not go long periods of time without eating.    3.  Move a much as you can.    Snack Ideas for your Meal Plan     Protein (1 serving = 6-8 grams of protein each)    Beef Jerky ( 2 pieces)    Beef Sticks, plain (1 stick)    Cheese/Cheese Stick (1 oz)    Chicken breast (1 oz)    Cottage cheese, low fat (1/4 cup)    Crab, Imitation (2 oz)    Deli Meat (2 oz)        Edamame, boiled (1/2 cup)    Egg, hardboiled (1)    Shrimp, small (10 pieces)     Turkey Breast (1 oz)    Tuna, canned in water (1 oz)    Fairlife Milk (1/2 cup)    Yogurt, Greek : Siggis, Oikos Triple Zero, Dannon Light and Fit, etc (6 oz)    Carb (1 carb choice/serving = 15 grams)     Apple (medium)    Applesauce, unsweetened (1/2 cup)    Apricots, dried (4 whole)    Banana, medium (1/2)    Bread, 1 slice     Cantaloupe/Melon (1 cup)    Crackers 4-6 (varies by brand)    Cherries (15)    Cranberries, Dried (2 tbsp)    Dark Chocolate (1 oz)    Dates, dried (2-3 pieces)    Fruit cocktail, in own juice (1/2 cup)    Granola Bar (vary by brand)    Grapes (1/2 cup)    Ice cream, slow churned/low fat (1/2 cup)    Kiwi (~2)    Mixed Berries (1 cup)    Orange (1 medium)    Peach (1 medium)    Pear (1/2 medium)    Plums (2)    Pomegranate (1 small)    Popcorn, stove popped (2 cups)    Pretzels (3/4 oz)    Pudding, sugar free (1/2 cup)    Raisins (2 Tbsp)    Rice Cake, (2)    Skim or 1% milk (1 cup)    Tortilla Chips (1 oz)    Yogurt (greek or plain)   cup    Fat (1 serving = 100 calories)    Almonds (2 Tbsp)    Avocado (1/3 fruit OR 3 Tbsp)    Cashews (11 whole)    Cheese (1 oz)    Chocolate, dark (3/4 oz)    Coconut, unsweetened (1/3 c shredded)    Hummus (3 Tbsp)    Peanuts (20 pieces)    Peanut/Nut Butter (1 Tbsp)    Pecans (10 halves)    Pumpkin seeds, unshelled (2 Tbsp)    Salad  dressing  (1-2 Tbsp)    Sunflower seeds (2 Tbsp)    Vegetable Dip (1-2 Tbsp)    Walnuts (8 halves)    Free Foods    Bell Peppers    Broccoli    Carrots     Cauliflower     Celery    Coffee, black (regular or decaf)    Cucumber    Gelatin, Sugar Free    Herbal Tea, unsweetened     Pea Pods    Pickles (high in sodium)     Popsicle, Sugar Free    Salsa (2 Tbsp)    Tomatoes    Combination Snacks    Apple + 1 Tbsp peanut butter (carbohydrate + fat)    Handful crackers + 1 oz cheese (carbohydrate + fat or protein)    Carrot sticks + Hummus (free food + fat)    1 piece of peanut butter toast (carbohydrate + fat)    Greek yogurt + 2 Tbsp sunflower seeds (carbohydrate + fat)    Hard-boiled egg +   cup grapes (protein + carbohydrate)    1 piece of avocado toast (carbohydrate + fat)    Cucumbers + dip (free food + fat)

## 2019-08-09 NOTE — TELEPHONE ENCOUNTER
Prior Authorization Retail Medication Request    Medication/Dose: Omega-3-acid Ethyl Esters 1GM capsules  ICD code (if different than what is on RX):    Previously Tried and Failed:    Rationale:      Insurance Name:  Medicare  Insurance ID:  [id number]          Pharmacy Information (if different than what is on RX)  Name:    Phone:

## 2019-08-09 NOTE — LETTER
8/9/2019       RE: Luz Thompson  [address]  [place] [address]     Dear Colleague,    Thank you for referring your patient, Luz Thompson, to the [facility] AND INFECTIOUS DISEASES at [facility]. Please see a copy of my visit note below.    [facility]  Transplant Infectious Disease Clinic Note     Patient:  Luz Thompson, Date of birth 1950, Medical record number [id number]  Date of Visit:  08/09/2019         Assessment and Recommendations:   Recommendations:  - Restart voriconazole 200 mg BID, rx sent, since she does not tolerate fluconazole (severe rash) as well as having GI difficulty with posaconazole. She is aware of the risk of skin cancer with prolonged vori use, and combined with the increased risk of skin cancer because she is a transplant patient, but she is very willing to accept this risk.  - Order placed for vori level with next labs. Has open orders already active for check of hepatic panel every 2 months.   - Continue clotrimazole as needed for prn rx of thrush.  - I've encouraged her to continue to proceed with completion of allergy testing.  - Annual check of EBV DNA added to open transplant orders.   - Return to clinic on 9/6/2019, and upon [place], ~ 8 months from now.    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 She gets recurrent gram negative sepsis. She has known sigmoid diverticulosis. She gets episodes of defecation syncope.   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 Followup CT imaging 6/27/2018 shows no worsening of the nodularities over a 1-yr interval. Follow-up Chest CT 5/21/2019 demonstrated unchanged 12 mm cavitary nodule in the lingula since 3/27/2018.  However, this nodule had increased in size since 4/24/2017 when it measured 8 mm and was FDG avid on the PET/CT 8/8/2017, so she needs continued therapy. She had severe rash from fluconazole, and did not tolerate posaconazole very well (GI issues), so she would like to restart voriconazole. She does get dry skin and areas of frequent picking of skin with vori use. Prior auth for vori refills will be completed as needed.   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 She had some side effects in the days following the dose of Rituxan, but otherwise would be able to tolerate it again in the future. Checking EBV level annually.   - Left otitis. On ciprodex drops prn. Followed in ENT clinic due to perforation.   - Self-triggered use of prn antibiotics due to recurrent episodes of sepsis. She gets a lot of itching with vantin, and quinolones and macrolides don't seem to work that well for her, so her current prn med is bactrim. If the bactrim does not do the job in controlling fever, because she has allergies to penicillins and quinolones (cipro and levaquin), she knows to be seen somewhere where she could be evaluated for a once daily infusion of ertapenem (also called Invanz).   - Hx of E coli pyelonephritis in 3/2019, hospitalized twice in [place].   - Hx of bacterial superinfection of chronic venous stasis changes on the legs, 7/27/2016.  - Hx of recurrent E coli sepsis 8/13/2013, 6/10/2015.   - Hx of Klebsiella UTI, 7/18/16. She completed a 14-day course of macrobid.  - Hx of Haemophilus influenzae pneumonia in 9/2014.  - Hx of angular chelitis, hx of thrush extending from under her upper denture plate, and onychomycosis.   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   - QTc interval 5/18/2018 457 msec.   - PCP prophylaxis: Not needed, as most recent CD4 count was > 200.   - Serostatus: CMV seronegative, EBV seropositive on 8/15/13.  - Immunization status: Completed PCV13 and PPSV23 x2 with last dose in 5/2016.  - Gamma globulin  "status: Endogenously replete.  - Isolation status: Good hand hygience. When she is an inpatient, she needs to be in contact isolation for known VRE carriage.    Crystal Montero MD. Pager [phone]         History of Infectious Disease Illness:   Since Virginia was last seen in ID clinic 5/15/2019, Rx sent through 6/6/2019 to change voriconazole to posaconazole, due to sun exposure when she lives in [place] in the winter. She stopped posaconazole 8/7/2019 for incontinent bowels and foul smelling stool, and this has improved now. Today is the first day she thought that it was ok to try to wear white pants. Dermatology appt abrasive for her. She has gained 13 lbs of water weight, diuretic increased, needs new power socks due to swelling of legs the old socks are too small. She tried Hiprex at bedtime for over a month, due to recurrent UTIs, also led to loose stools and foul smelling stool. She spends the winter in [place] starting in mid-9/2019, different places here in [facility] in the summer, currently in [place]. She is living in a \"park model\" in [place]. Recent low K+ level from 8/5/2019 being addressed by nephrology. Recent elevated TSH being addressed by endocrinology, and with the increased dose of thyroid medication she feels a bit off kilter, still getting used to the new dose.     Transplants:  5/22/2002 (Liver), Postoperative day:  6288         Review of Systems:   CONSTITUTIONAL:  No fever, chills, night sweats.  EYES: negative for icterus. Vision good with treatment of cataract and glaucoma issues.   ENT:  She wears hearing aids. She takes her dentures out at night. No sore throat.   RESPIRATORY:  No cough, no sputum, but sometimes she has dyspnea.   CARDIOVASCULAR:  negative for chest pain, no heart palpitations. Had a run of afib in 6/2019.  GASTROINTESTINAL:  negative for nausea or vomiting, but stools are very soft  GENITOURINARY:  No dysuria or hematuria  HEME:  Normal amount of easy bruising, + easy " "bleeding from nosebleeds.   INTEGUMENT:  She has dry skin. No new rash.   NEURO:  No headache. No tremor.     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      Diverticulosis of sigmoid colon 12/21/2013     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     Received Rituxan during Summer of 2016     H/O esophageal varices      Hearing loss      Heart murmur 4/4/2012     History of DVT (deep vein thrombosis)      History of [facility] fever      History of thyroid cancer 9/25/2012     Hyperlipidemia 4/10/2012    Says that she does not have it anymore, not on meds     Hyperlipidemia LDL goal <70      Hypertension goal BP (blood pressure) < 140/80 11/06/2013     Hypertriglyceridemia      Liver replaced by transplant (H) 10/17/2011    Dr. Gentry Ramirez, 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       Past Surgical History:   Procedure Laterality Date     APPENDECTOMY  1961     BIOPSY       CATARACT IOL, RT/LT      RE12/19/2013, LE12/10/2013 - Toric lenses     CHOLECYSTECTOMY  1991     COLONOSCOPY  3/10/2014    Procedure: COLONOSCOPY;;  Surgeon: Gentry Ramirez MD;  Location:  GI     ear drum repair       ENDOBRONCHIAL ULTRASOUND " FLEXIBLE N/A 2017    Procedure: ENDOBRONCHIAL ULTRASOUND FLEXIBLE;  Flexible Bronchoscopy, Endobronchial Ultrasound, Transbronchial Needle Aspiration ;  Surgeon: Eden Clinton MD;  Location: [facility] OR     ENDOSCOPIC RETROGRADE CHOLANGIOPANCREATOGRAM  2013    Procedure: ENDOSCOPIC RETROGRADE CHOLANGIOPANCREATOGRAM;  Endoscopic Retrograde Cholangiopancreatogram with single balloon enteroscopy, ballon sweep of bile duct;  Surgeon: Brett Membreno MD;  Location: [facility] OR     HC KNEE SCOPE,MED/LAT MENISECTOMY  8/10/12    Right, partial medial menisectomy only     KNEE SURGERY  1966    R knee     PICC INSERTION  2013    4fr SL PASV PICC, 40cm (1cm external) in the R basilic vein w/ tip in the low SVC     PICC INSERTION  2014    5 fr DL BioFlo Navilyst PICC, 46 cm (3 cm external) in the L basilic vein w/ tip in the SVC RA junction.     THYROIDECTOMY  3/2010     TRANSPLANT LIVER RECIPIENT LIVING UNRELATED         Family History   Problem Relation Age of Onset     Hypertension Mother      Endometrial Cancer Mother      Hyperlipidemia Mother      Prostate Cancer Father      Macular Degeneration Father      Cancer - colorectal Maternal Grandmother         in her 80's, has surgery and removal of part of kidney,  at age 98     Heart Disease Maternal Grandfather          at 98     Glaucoma Maternal Grandfather      Cerebrovascular Disease Paternal Grandmother         in her 80's     Hypertension Paternal Grandmother      Heart Disease Paternal Grandfather         MI     Alzheimer Disease Paternal Grandfather      Allergies Son      Neurologic Disorder Daughter         Migraines     Breast Cancer Other      Anesthesia Reaction No family hx of      Crohn's Disease No family hx of      Ulcerative Colitis No family hx of        Social History     Social History Narrative    She is retired. She lives in the [facility] [place] the course of the [place]. [place]  [place]years [place] the [place] with hogs, cows, corn and soybean crops.     Social History     Tobacco Use     Smoking status: Former Smoker     Packs/day: 1.00     Years: 18.00     Pack years: 18.00     Types: Cigarettes     Last attempt to quit: 1985     Years since quittin.3     Smokeless tobacco: Never Used   Substance Use Topics     Alcohol use: Yes     Alcohol/week: 0.0 oz     Comment: rare - \"I toast at weddings\"     Drug use: No       Immunization History   Administered Date(s) Administered     [id number] Novel Flu P-free 11/10/2009     Influenza (High Dose) 3 valent vaccine 2015, 10/30/2016, 10/23/2017     Influenza (IIV3) PF 11/10/2004, 2007, 10/01/2010, 2012, 10/29/2012, 2013, 2016     Influenza Quad, Recombinant, p-free (RIV4) 10/20/2018     Pneumo Conj 13-V (2010&after) 2014     Pneumococcal 23 valent 2007, 2016     TD (ADULT, 7+) 2007     TDAP Vaccine (Adacel) 2007, 2014       Patient Active Problem List   Diagnosis     Bladder infection, chronic     Osteoarthritis of right knee     HDL deficiency     Advanced directives, counseling/discussion     Vitamin D deficiency     Status post tympanoplasty     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     Prophylactic antibiotic     High risk medication use     Statin intolerance     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     Sensorineural hearing loss (SNHL) of both ears     Abnormal liver function tests     Liver replaced by transplant (H)     Type 2 diabetes, HbA1c goal < 7% (H)     Hyperlipidemia LDL goal <70     Hypertension goal BP (blood pressure) < 140/80     Postoperative hypothyroidism     Type 2 diabetes mellitus with stage 3 chronic kidney disease, without long-term current use of insulin (H)     Age-related osteoporosis without current pathological fracture     Tympanic membrane perforation, left     Other infective chronic otitis externa of left ear     CKD (chronic kidney disease) stage 3, GFR 30-59 ml/min (H)     Pain " of right thumb       Outpatient Medications Marked as Taking for the 8/9/19 encounter (Office Visit) with Crystal Montero MD   Medication Sig     acetaminophen 500 MG CAPS Take 1,000 mg by mouth nightly as needed Take 500-1,000 mg by mouth every 6 hours if needed.      alirocumab (PRALUENT) 150 MG/ML injectable pen Inject 1 mL (150 mg) Subcutaneous every 14 days     benzoyl peroxide 5 % external liquid Use daily as directed     blood glucose monitoring (NO BRAND SPECIFIED) meter device kit Use to test blood sugar 2times daily or as directed.     blood glucose monitoring (NO BRAND SPECIFIED) test strip Use to test blood sugar 2 times daily, before breakfast and before bedtime     calcitRIOL (ROCALTROL) 0.5 MCG capsule Take 1 capsule (0.5 mcg) by mouth daily     ciprofloxacin-dexamethasone (CIPRODEX) otic suspension Place 4 drops Into the left ear three times a week     clotrimazole (LOTRIMIN) 1 % cream Apply topically 2 times daily as needed Reported on 4/25/2017     ELIQUIS 2.5 MG tablet Take 1 tablet (2.5 mg) by mouth 2 times daily     estradiol (ESTRACE VAGINAL) 0.1 MG/GM cream Place 2 g vaginally twice a week     ezetimibe (ZETIA) 10 MG tablet Take 1 tablet (10 mg) by mouth daily     ferrous gluconate (FERGON) 324 (38 Fe) MG tablet Take 1 tablet (324 mg) by mouth 3 times daily (with meals)     folic acid (FOLVITE) 1 MG tablet Take 1 tablet (1 mg) by mouth daily     furosemide (LASIX) 20 MG tablet Take 1 tablet (20 mg) by mouth daily     hydrALAZINE (APRESOLINE) 25 MG tablet Take 0.5 tablets (12.5 mg) by mouth 3 times daily as needed , if systolic blood pressure is more than 140 mmHg.     Hypromellose (ARTIFICIAL TEARS OP) Apply 1 drop to eye as needed     levothyroxine (SYNTHROID/LEVOTHROID) 175 MCG tablet Take 1 tablet (175 mcg) by mouth daily     losartan (COZAAR) 25 MG tablet Take 1 tablet (25 mg) by mouth daily     meclizine (ANTIVERT) 25 MG tablet Take 1 tablet (25 mg) by mouth as needed      metoprolol succinate (TOPROL-XL) 50 MG 24 hr tablet Take 1 tablet (50 mg) by mouth daily     metroNIDAZOLE (METROCREAM) 0.75 % cream Apply topically 2 times daily     metroNIDAZOLE (METROGEL) 0.75 % external gel Apply topically 2 times daily Put on face     Multiple Vitamins-Minerals (PRESERVISION AREDS 2) CAPS Take 1 capsule by mouth 2 times daily     omega-3 acid ethyl esters (LOVAZA) 1 g capsule Take 1 capsule (1 g) by mouth 2 times daily     order for DME Equipment being ordered: right brace with thumb     posaconazole (NOXAFIL) 100 MG EC tablet Take 3 tablets (300 mg) by mouth every morning     predniSONE (DELTASONE) 5 MG tablet Take 1 tablet (5 mg) by mouth daily     RAPAMUNE (BRAND) 1 MG tablet Take 1 tablet (1 mg) by mouth every other day     SUMAtriptan (IMITREX) 50 MG tablet Take 1 tablet (50 mg) by mouth at onset of headache for migraine repeat after 2 hours if needed.     triamcinolone (KENALOG) 0.025 % cream Apply topically 2 times daily To eyelids     triamcinolone (KENALOG) 0.1 % cream Apply topically 2 times daily as needed for irritation     ursodiol (ACTIGALL) 250 MG tablet TAKE 1 TABLET BY MOUTH TWICE A DAY     Wheat Dextrin (BENEFIBER) POWD 2 teaspoons daily     Current Facility-Administered Medications for the 8/9/19 encounter (Office Visit) with Crystal Montero MD   Medication     ceFAZolin (ANCEF) injection 500 mg     cefTRIAXone (ROCEPHIN) injection 250 mg     penicillin g potassium [id number] unit vial INTRADERMAL       Allergies   Allergen Reactions     Fluconazole Hives and Itching     Ciprofloxacin Anxiety and Other (See Comments)     Irregular heart beat     Azithromycin Itching     Benadryl [Diphenhydramine Hcl]      Insomnia      Cellcept Diarrhea     Ciprofloxacin Other (See Comments)     Insomnia, mood lability     Codeine      Psych disturbance     Codeine      Diphenhydramine Other (See Comments)     Doxycycline      Lansoprazole Diarrhea     Levaquin [Levofloxacin] Other (See  "Comments)     Headache, hyperactivity     Lisinopril Cough     Methotrexate      Sores     Morphine Sulfate Itching     Mycophenolate Diarrhea     No Clinical Screening - See Comments      Simvastatin Muscle Pain (Myalgia)     severe     Cephalexin Rash     Fever and skin burning     Penicillin G Itching and Rash     Tolectin [Nsaids] Rash     Tramadol Rash            Physical Exam:   Vitals were reviewed.  All vitals stable.  BP (!) 172/79   Pulse 76   Temp 98  F (36.7  C) (Oral)   Ht 1.715 m (5' 7.5\")   Wt 85.1 kg (187 lb 11.2 oz)   LMP 06/01/1988 (Approximate)   SpO2 98%   BMI 28.96 kg/m      Wt Readings from Last 4 Encounters:   08/09/19 85.1 kg (187 lb 11.2 oz)   08/07/19 85.4 kg (188 lb 4.8 oz)   08/07/19 85.4 kg (188 lb 4.8 oz)   08/05/19 85.3 kg (188 lb)       Physical Examination:  GENERAL:  well-developed, well-nourished woman, in no acute distress.  HEENT:  Head is normocephalic, atraumatic   EYES:  Eyes have anicteric sclerae.   ENT: Hearing aids in place today. Dentures in place, no thrush. Mouth is very dry, with dry debris.   NECK:  Supple.   LUNGS: Clear to auscultation bilateral.   CARDIOVASCULAR: Regular rate and rhythm with 2/6 systolic murmur   ABDOMEN: Normal bowel sounds, not tender. Subcostal surgical scar not otherwise examined.  SKIN:  No acute rash. She is not wearing stockings. Onychomycosis not examined today.   NEUROLOGIC:  Grossly nonfocal. Active x4 extremities         Laboratory Data:     Absolute CD4   Date Value Ref Range Status   08/19/2014 415 mm3 Final   09/16/2013 1,266 mm3 Final   09/15/2013 DUPLICATE,TESTING DONE ON SPECIMEN FROM 9.16.13 mm3 Final   11/13/2008 1332 mm3 Final       Inflammatory Markers    Recent Labs   Lab Test 05/20/19  0957 07/30/18  0855 09/03/17  0912 09/02/17  0648 09/01/17  0832 05/14/16  0659 05/13/16  0940 09/23/14  0810 08/19/14  1530  06/30/14  0825 05/15/14  1112 05/08/14  0806   SED 47* 73*  --   --   --   --  67* 79* 76*  --  51* 58* 51* "   CRP <2.9 5.2 64.0* 71.0* 51.0* 21.0* 19.0* 6.1 29.8*   < > 12.5* 8.0 <5.0    < > = values in this interval not displayed.       Immune Globulin Studies    Recent Labs   Lab Test 08/05/19  1552 08/19/14  1530 05/21/12  0754   IGG 1,110 1,220 1070     --  97   IGE  --  46  --      --  85   IGG1  --  684  --    IGG2  --  441  --    IGG3  --  70  --    IGG4  --  19  --        Metabolic Studies       Recent Labs   Lab Test 08/05/19  1552 05/20/19  0957 12/28/18 10/16/18  0905 10/16/18  0902 07/30/18  0913 05/19/18  0629  04/17/18  0942  05/10/17  0929  05/13/16  0940  07/23/14  1119    138 140  --  138 140 136   < > 138   < > 138   < > 132*   < > 137   POTASSIUM 2.9* 3.8 3.9  --  3.8 3.3* 4.1   < > 3.9   < > 4.2   < > 3.6   < > 3.8   CHLORIDE 99 105 99  --  102 102 103   < > 102   < > 102   < > 96   < > 98   CO2 30 26 28  --  27 27 27   < > 28   < > 26   < > 26   < > 27   ANIONGAP 10 7 12  --  9 11 6   < > 8   < > 10   < > 10   < > 12   BUN 36* 23 16  13.6  --  26 26 20   < > 36*   < > 36*   < > 29   < > 35*   CR 1.41* 1.14* 1.18  --  1.19* 1.11* 1.29*   < > 1.74*   < > 1.53*   < > 1.64*   < > 1.70*   GFRESTIMATED 38* 49* 47*  --  45* 49* 41*   < > 29*   < > 34*   < > 31*   < > 30*   * 94 103*  --  102* 79 103*   < > 125*   < > 104*   < > 98   < > 109*   A1C  --   --   --  6.4*  --   --   --    < >  --   --   --   --   --   --   --    KEILY 8.5 8.9 8.9  --  8.9 8.7 8.5   < > 9.1   < > 9.7   < > 9.1   < > 9.1   PHOS 3.3  --   --   --   --   --   --   --   --   --  3.1   < >  --    < >  --    MAG 2.2  --   --   --   --   --   --   --  2.8*   < > 2.8*   < > 2.2   < >  --    LACT  --   --   --   --   --   --   --   --   --   --   --   --  0.8  --  1.7    < > = values in this interval not displayed.       Hepatic Studies    Recent Labs   Lab Test 08/05/19  1552 05/20/19  0957 12/28/18 10/16/18  0902 07/30/18  0913 05/14/18  1019   BILITOTAL 0.3 0.2 0.2 0.2 0.2 0.2   ALKPHOS 213* 197* 227* 238* 276*  240*   ALBUMIN 3.0* 3.2* 3.8 3.0* 2.8* 3.1*   AST 25 20 20 19 25 23   ALT 40 36 19 23 37 43       Lipid testing    Recent Labs   Lab Test 05/20/19  0957 12/28/18 10/16/18  0902  09/18/15  0954   CHOL 185 225* 300*   < > 181   HDL 65 47 52   < > 63   LDL 91 110 194*   < > 84   TRIG 146 339* 269*   < > 172*   CHOLHDLRATIO  --  4.8*  --   --  2.9    < > = values in this interval not displayed.       Gout Labs      Recent Labs   Lab Test 08/05/19  1552 12/31/13  1443 07/30/13  1441   URIC 5.6 6.7 6.7       Hematology Studies      Recent Labs   Lab Test 08/05/19  1552 05/20/19  0957 10/16/18  0902 07/30/18  0913 05/19/18  0629 05/18/18  0731  09/02/17  0648 09/01/17  0832 08/31/17  1306  05/16/16  0827  05/14/16  0659   WBC 9.1 8.4 7.2 7.4 6.1 8.0   < > 10.2 9.4 10.6   < > 5.7   < > 4.7   ANEU  --   --   --   --   --  5.0  --  7.4 6.5 8.6*  --  2.5  --  2.1   ALYM  --   --   --   --   --  1.9  --  1.7 1.7 1.3  --  2.4  --  1.6   KATHY  --   --   --   --   --  1.0  --  0.9 1.0 0.7  --  0.6  --  0.9   AEOS  --   --   --   --   --  0.1  --  0.2 0.1 0.0  --  0.1  --  0.1   HGB 11.5* 11.9 10.3* 10.7* 10.3* 11.6*   < > 10.5* 11.1* 12.4   < > 11.1*   < > 10.0*   HCT 35.7 37.2 33.1* 33.8* 33.0* 36.0   < > 34.0* 35.0 38.5   < > 35.4   < > 31.8*    366 385 327 302 324   < > 259 269 290   < > 256   < > 230    < > = values in this interval not displayed.       Iron Testing    Recent Labs   Lab Test 08/05/19  1552  07/30/18  0855  07/11/13  0814  12/22/12  1346  12/20/12 2005 12/07/12  1345   IRON 56  --  39  --  58   < >  --   --   --  48     --  307  --  285   < >  --   --   --  156*   IRONSAT 21  --  13*  --  20   < >  --   --   --  30   LAURA 118  --  18   < > 195   < >  --   --   --  317*   MCV 88   < >  --    < > 88   < > 100   < > 103*  --    B12  --   --   --   --   --   --   --   --   --  281   [id number]  --   --   --   --   --   --   --   --   --  272*   HAPT  --   --   --   --   --   --   --   --  137  --    RETP   --   --   --   --   --   --  2.7*  --   --   --    RETICABSCT  --   --   --   --   --   --  71.0  --   --   --     < > = values in this interval not displayed.       Clotting Studies    Recent Labs   Lab Test 05/18/18  0731 05/16/16  0827 05/13/16  0940 11/06/13  1246  06/19/11  1815   INR 1.06 1.02 1.11 0.96   < > 1.08   PTT 33  --  33  --   --  28    < > = values in this interval not displayed.       Thyroid Studies     Recent Labs   Lab Test 05/20/19  0957 10/16/18  0902 07/30/18  0855 07/13/17  0843 05/10/17  0929  09/23/14  0810   TSH 12.11* 18.39* 8.64* 3.66 4.74*   < > 2.87   T4 0.99 0.85 1.02 1.59* 1.48*   < > 1.29  9.1   T3  --   --  44*  --   --   --  65    < > = values in this interval not displayed.       Urine Studies     Recent Labs   Lab Test 08/05/19  1552 05/22/19  1212 10/16/18  0925 07/30/18  0852 05/02/18  1008 08/31/17  2100 08/22/17  0913   URINEPH 5.5 5.5 6.0 6.0 6.0 8.0* 7.0   NITRITE Negative Neg Negative Negative Neg Negative Negative   LEUKEST Negative Small Small* Moderate* Trace Small* Trace*   WBCU 0 - 5  --  0 - 5 5-10*  --  2 2-5*       Medication levels    Recent Labs   Lab Test 05/20/19  0957  10/23/17  1025  09/15/13  0435  12/21/12  1344   VANCOMYCIN  --   --   --   --  26.3  --   --    VCON 0.4*   < >  --   --   --   --   --    CYCLSP  --   --  Canceled, Test credited   < >  --   --   --    RAPAMY 5.5   < >  --    < >  --    < >  --    MPACID  --   --   --   --   --   --  5.42*   MPAG  --   --   --   --   --   --  83.5    < > = values in this interval not displayed.       Microbiology:    [facility]:        Last Culture results with specimen source  Culture Micro   Date Value Ref Range Status   09/27/2018 Moderate growth  Staphylococcus aureus   (A)  Final   07/30/2018   Final    50,000 to 100,000 colonies/mL  mixed urogenital louann  Susceptibility testing not routinely done     07/18/2016 (A)  Final    50,000 to 100,000 colonies/mL Klebsiella pneumoniae   05/21/2016 No  growth  Final   05/21/2016 No growth  Final   05/16/2016 Canceled, Test credited Duplicate request  Final   05/16/2016 Canceled, Test credited Duplicate request  Final   05/16/2016 No growth  Final   05/16/2016 No growth  Final   05/13/2016 No growth after 29 days  Final   05/13/2016   Final    Canceled, Test credited Quantity not sufficient Notification of test cancellation   was given to [facility]THEW FROM [facility], HE WILL REORDER AND RECOLLECT     05/13/2016 No growth  Final   05/13/2016 No growth  Final   05/13/2016   Final    10,000 to 50,000 colonies/mL mixed urogenital louann  Susceptibility testing not routinely done     05/13/2016 No growth  Final   09/25/2014 (A)  Final    Normal louann  Moderate growth Haemophilus influenzae Beta lactamase negative     09/03/2014 (A)  Final    Normal louann  Heavy growth Haemophilus influenzae Beta lactamase negative  beta lactamase negative isolates are susceptible to ampicillin,   amoxacillin/clavulanic, levofloxacin and ceftriaxone     08/19/2014 No growth  Final   08/19/2014 No growth  Final    Specimen Description   Date Value Ref Range Status   09/27/2018 Right Hand Wound  Final   07/30/2018 Midstream Urine  Final   07/18/2016 Midstream Urine  Final   05/21/2016 Blood Right Arm  Final   05/21/2016 Blood Left Arm  Final   05/16/2016 Blood  Final   05/16/2016 Blood  Final   05/16/2016 Blood Left Arm  Final   05/16/2016 Blood Right Arm  Final   05/13/2016 Blood Unspecified Site  Final   05/13/2016 Blood Unspecified Site  Final   05/13/2016 Blood Right Arm  Final   05/13/2016 Blood Right Arm  Final   05/13/2016 Midstream Urine  Final   05/13/2016 Nasal  Final   05/13/2016 Blood Venipuncture Collection VPT  Final   09/25/2014 Sputum  Final   09/25/2014 Sputum  Final   09/03/2014 Sputum  Final   09/03/2014 Sputum  Final        Last check of C difficile  C Diff Toxin B PCR   Date Value Ref Range Status   05/17/2012   Final    Negative: Clostridium difficile target DNA sequences  NOT detected, presumed   negative for Clostridium difficile toxin B or the number of bacteria present   may be below the limit of detection for the test.   FDA approved assay performed using [employer] GeneXpert real-time PCR.   A negative result does not exclude actual disease due to Clostridium difficile   and may be due to improper collection, handling and storage of the specimen or   the number of organisms in the specimen is below the detection limit of the   assay.       Virology:  CMV Quantitative   Date Value Ref Range Status   08/19/2014 <100 <100 Copies/mL Final   08/15/2013 <100 <100 Copies/mL Final   11/11/2008 <100 <100 Copies/mL Final     Comment:     No CMV DNA detected.   08/21/2007 <100 <100 Copies/mL Final     Comment:     No CMV DNA detected.   01/10/2007 <100 <100 Copies/mL Final     Comment:     No CMV DNA detected.       EBV DNA Copies/mL   Date Value Ref Range Status   07/30/2018 2,166 (A) EBVNEG^EBV DNA Not Detected [Copies]/mL Final   08/22/2017 EBV DNA Not Detected EBVNEG^EBV DNA Not Detected [Copies]/mL Final   04/11/2017 (A) EBVNEG [Copies]/mL Final    <500  EBV DNA Detected below the reportable range of 500 Copies/mL     12/09/2016 1,219 (A) EBVNEG [Copies]/mL Final   08/08/2016 29,569 (A) EBVNEG [Copies]/mL Final   07/26/2016 11,522 (A) EBVNEG [Copies]/mL Final   05/24/2016 24,676 (A) EBVNEG [Copies]/mL Final   05/13/2016 19,224 (A) EBVNEG [Copies]/mL Final   11/20/2015 25,676 (A) EBVNEG [Copies]/mL Final   09/14/2015 48,332 (A) EBVNEG [Copies]/mL Final   07/20/2015 48,923 (A) EBVNEG [Copies]/mL Final   09/15/2013 <1000 <1000 Copies/mL Final   08/15/2013 <1000 <1000 Copies/mL Final   11/12/2008 <1000 <1000 Copies/mL Final       BK viral loads   Recent Labs   Lab Test 07/28/11  1150   BKSPEC Urine   BKRES <1000       Imaging   CT CHEST W/O CONTRAST 6/27/2018 3:15 PM  Comparison: CT chest 3/27/2018, 7/31/2017, 4/24/2017; PET CT 8/8/2017   Findings: Chest: Heart size within normal limits. No  pericardial effusion. The  thoracic aorta and pulmonary artery are normal caliber. No enlarged  thoracic lymph nodes by CT short axis size criteria.  Central airways are patent. No pleural effusion or pneumothorax.  Innumerable calcified granulomas, mostly clustered in the right lung  base, nicely change from the prior CT. There is a cavitary 12 mm  nodule in the lingula, which previously measured 1.2 cm on 3/27/2018  (however this measured 8 mm on 4/24/2017). 4 mm pulmonary nodule in  the left lower lobe (series 6 image 155) which is unchanged from 4/24/2017.  Upper abdomen: Limited evaluation of the upper abdomen. Unchanged  pneumobilia. Postsurgical changes of liver transplant. Atrophic  kidneys. Adrenal glands are normal.        Impression:   1. Unchanged 12 mm cavitary nodule in the lingula since 3/27/2018.  However, this nodule is increased in size since 4/24/2017 when it  measured 8 mm and was FDG avid on the PET/CT 8/8/2017. Recommend  continued close follow-up (3-6 months) and/or soft tissue biopsy.  2. Extensive granulomatous disease mostly clustered in the right lower lobe.       Again, thank you for allowing me to participate in the care of your patient.      Sincerely,    Crystal Montero MD

## 2019-08-09 NOTE — PROGRESS NOTES
"Diabetes Self-Management Education & Support    Diabetes Education Self Management & Training    SUBJECTIVE/OBJECTIVE:  Presents for: Individual review  Accompanied by: Self  Diabetes type: Type 2  Diabetes management related comments/concerns: neuropathy in feet and balance issues and swelling, toes are swollen  Cultural Influences/Ethnic Background:  Not  or       Diabetes Symptoms & Complications  Blurred vision: No  Neuropathy: Yes(mainly feet rarely tingling in fingers)  Peripheral neuropathy: Yes    Patient Problem List and Family Medical History reviewed for relevant medical history, current medical status, and diabetes risk factors.    Vitals:  LMP 06/01/1988 (Approximate)   Estimated body mass index is 28.96 kg/m  as calculated from the following:    Height as of an earlier encounter on 8/9/19: 1.715 m (5' 7.5\").    Weight as of an earlier encounter on 8/9/19: 85.1 kg (187 lb 11.2 oz).   Last 3 BP:   BP Readings from Last 3 Encounters:   08/09/19 (!) 172/79   08/07/19 125/72   08/07/19 125/72       History   Smoking Status     Former Smoker     Packs/day: 1.00     Years: 18.00     Types: Cigarettes     Quit date: 4/12/1985   Smokeless Tobacco     Never Used       Labs:  Lab Results   Component Value Date    A1C 6.4 10/16/2018     Lab Results   Component Value Date     08/05/2019     Lab Results   Component Value Date    LDL 91 05/20/2019     HDL Cholesterol   Date Value Ref Range Status   05/20/2019 65 >49 mg/dL Final   ]  GFR Estimate   Date Value Ref Range Status   08/05/2019 38 (L) >60 mL/min/[1.73_m2] Final     Comment:     Non  GFR Calc  Starting 12/18/2018, serum creatinine based estimated GFR (eGFR) will be   calculated using the Chronic Kidney Disease Epidemiology Collaboration   (CKD-EPI) equation.       GFR Estimate If Black   Date Value Ref Range Status   08/05/2019 44 (L) >60 mL/min/[1.73_m2] Final     Comment:      GFR Calc  Starting " 12/18/2018, serum creatinine based estimated GFR (eGFR) will be   calculated using the Chronic Kidney Disease Epidemiology Collaboration   (CKD-EPI) equation.       Lab Results   Component Value Date    CR 1.41 08/05/2019     No results found for: MICROALBUMIN    Healthy Eating  Breakfast: Brunch: eggs, english muffin, apple sayce or yogurt, coffee or instant oatmeal with walnuts   Dinner: Tonight she is going to make crockpot chicken with vegetables and she will use some of the chicken and broth to also make a soup. [place] and zucchini, tomatoes, soup  Snacks: Not doing much snacking. yogurt. boost. soda crackers.      Being Active  Being Active Assessed Today: (has a  in [place] 2 days a week)  Exercise:: Currently not exercising      Monitoring  Monitoring Assessed Today: Yes  Did patient bring glucose meter to appointment? : Yes  Blood Glucose Meter: TrueResult  Overall Range (mg/dL): 140-180 (was on Prednisone 40 mg daily, also an antifungal which raises blood sugar)    Taking Medications  none    Reducing Risks  Has dilated eye exam at least once a year?: Yes  Sees podiatrist (foot doctor)?: Yes(in [place])    Healthy Coping     Patient Activation Measure Survey Score:  SANDIP Score (Last Two) 4/4/2012 5/24/2016   SANDIP Raw Score 41 38   Activation Score 63.2 52.9   SANDIP Level 3 2       ASSESSMENT:  Type 2 diabetes, blood sugar to goal      Patient's most recent   Lab Results   Component Value Date    A1C 6.4 10/16/2018    is meeting goal of <8.0    INTERVENTION:       Education provided today on:  Topics that were covered in today's visit:    Basic pathophysiology of T2DM, relationship between carbohydrate intake, release of glucose from the liver, exercise and weight management on glucose control.    Self blood glucose monitoring (SBGM) using the  meter.    Target blood glucose for pre-meal and 2 hour post-prandial glucose    Basic meal planning using the plate method, emphasizing portion control,  healthy food choices and eliminating or minimizing sugared beverages.    Need for consistent physical activity, 30-45 minutes duration, preferably 4-6 days per week.     We also covered foot care for people with diabetes at patient's request.  She is leaving for [place] soon so she will continue education down there.    Opportunities for ongoing education and support in diabetes-self management were discussed.    Pt verbalized understanding of concepts discussed and recommendations provided today.       Education Materials Provided:   Accu-Chek Guide meter kit   Foot Care Tips for People with Diabetes    PLAN:  See Patient Instructions for co-developed, patient-stated behavior change goals.  AVS printed and provided to patient today. See Follow-Up section for recommended follow-up.    Time Spent: 60 minutes  Encounter Type: Individual    Any diabetes medication dose changes were made via the CDE Protocol and Collaborative Practice Agreement with the patient's referring provider. A copy of this encounter was shared with the provider.

## 2019-08-09 NOTE — NURSING NOTE
"Chief Complaint   Patient presents with     RECHECK     Coccidioidal pneumonitis      BP (!) 172/79   Pulse 76   Temp 98  F (36.7  C) (Oral)   Ht 1.715 m (5' 7.5\")   Wt 85.1 kg (187 lb 11.2 oz)   LMP 06/01/1988 (Approximate)   SpO2 98%   BMI 28.96 kg/m    Marina Church [facility]  8/9/2019 7:35 AM      "

## 2019-08-09 NOTE — TELEPHONE ENCOUNTER
PA Initiation    Medication: Omega-3-acid Ethyl Esters 1 GM Capsules -   Insurance Company: [employer] - Phone [phone] Fax [phone]  Pharmacy Filling the Rx: [id number] - [place] - [id number]  Filling Pharmacy Phone: [phone]  Filling Pharmacy Fax: [phone]  Start Date: 8/9/2019

## 2019-08-09 NOTE — PROGRESS NOTES
[facility]  Transplant Infectious Disease Clinic Note     Patient:  Luz Thompson, Date of birth 1950, Medical record number [id number]  Date of Visit:  08/09/2019         Assessment and Recommendations:   Recommendations:  - Restart voriconazole 200 mg BID, rx sent, since she does not tolerate fluconazole (severe rash) as well as having GI difficulty with posaconazole. She is aware of the risk of skin cancer with prolonged vori use, and combined with the increased risk of skin cancer because she is a transplant patient, but she is very willing to accept this risk.  - Order placed for vori level with next labs. Has open orders already active for check of hepatic panel every 2 months.   - Continue clotrimazole as needed for prn rx of thrush.  - I've encouraged her to continue to proceed with completion of allergy testing.  - Annual check of EBV DNA added to open transplant orders.   - Return to clinic on 9/6/2019, and upon [place], ~ 8 months from now.    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 She gets recurrent gram negative sepsis. She has known sigmoid diverticulosis. She gets episodes of defecation syncope.   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 Followup CT imaging 6/27/2018 shows no worsening of the nodularities over a 1-yr interval. Follow-up Chest CT 5/21/2019 demonstrated unchanged 12 mm cavitary nodule in the lingula since 3/27/2018. However, this nodule had increased in size since 4/24/2017 when it measured 8 mm and was FDG avid on the PET/CT 8/8/2017, so she needs continued therapy. She had severe rash from fluconazole, and did not tolerate posaconazole very well (GI issues), so she would like to restart voriconazole. She does get dry skin and areas of  frequent picking of skin with vori use. Prior auth for vori refills will be completed as needed.   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 She had some side effects in the days following the dose of Rituxan, but otherwise would be able to tolerate it again in the future. Checking EBV level annually.   - Left otitis. On ciprodex drops prn. Followed in ENT clinic due to perforation.   - Self-triggered use of prn antibiotics due to recurrent episodes of sepsis. She gets a lot of itching with vantin, and quinolones and macrolides don't seem to work that well for her, so her current prn med is bactrim. If the bactrim does not do the job in controlling fever, because she has allergies to penicillins and quinolones (cipro and levaquin), she knows to be seen somewhere where she could be evaluated for a once daily infusion of ertapenem (also called Invanz).   - Hx of E coli pyelonephritis in 3/2019, hospitalized twice in [place].   - Hx of bacterial superinfection of chronic venous stasis changes on the legs, 7/27/2016.  - Hx of recurrent E coli sepsis 8/13/2013, 6/10/2015.   - Hx of Klebsiella UTI, 7/18/16. She completed a 14-day course of macrobid.  - Hx of Haemophilus influenzae pneumonia in 9/2014.  - Hx of angular chelitis, hx of thrush extending from under her upper denture plate, and onychomycosis.   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   - QTc interval 5/18/2018 457 msec.   - PCP prophylaxis: Not needed, as most recent CD4 count was > 200.   - Serostatus: CMV seronegative, EBV seropositive on 8/15/13.  - Immunization status: Completed PCV13 and PPSV23 x2 with last dose in 5/2016.  - Gamma globulin status: Endogenously replete.  - Isolation status: Good hand hygience. When she is an inpatient, she needs to be in contact isolation for known VRE carriage.    Crystal Montero MD. Pager [phone]         History of Infectious Disease Illness:   Since Virginia was last seen in ID clinic 5/15/2019, Rx sent through 6/6/2019 to  "change voriconazole to posaconazole, due to sun exposure when she lives in [place] in the winter. She stopped posaconazole 8/7/2019 for incontinent bowels and foul smelling stool, and this has improved now. Today is the first day she thought that it was ok to try to wear white pants. Dermatology appt abrasive for her. She has gained 13 lbs of water weight, diuretic increased, needs new power socks due to swelling of legs the old socks are too small. She tried Hiprex at bedtime for over a month, due to recurrent UTIs, also led to loose stools and foul smelling stool. She spends the winter in [place] starting in mid-9/2019, [place], currently in [place]. She is living in a \"park model\" in [place]. Recent low K+ level from 8/5/2019 being addressed by nephrology. Recent elevated TSH being addressed by endocrinology, and with the increased dose of thyroid medication she feels a bit off kilter, still getting used to the new dose.     Transplants:  5/22/2002 (Liver), Postoperative day:  [id number]         Review of Systems:   CONSTITUTIONAL:  No fever, chills, night sweats.  EYES: negative for icterus. Vision good with treatment of cataract and glaucoma issues.   ENT:  She wears hearing aids. She takes her dentures out at night. No sore throat.   RESPIRATORY:  No cough, no sputum, but sometimes she has dyspnea.   CARDIOVASCULAR:  negative for chest pain, no heart palpitations. Had a run of afib in 6/2019.  GASTROINTESTINAL:  negative for nausea or vomiting, but stools are very soft  GENITOURINARY:  No dysuria or hematuria  HEME:  Normal amount of easy bruising, + easy bleeding from nosebleeds.   INTEGUMENT:  She has dry skin. No new rash.   NEURO:  No headache. No tremor.     Past Medical History:   Diagnosis Date     Anemia of other chronic disease 10/17/2011     Anxiety      Bladder infection, chronic 4/4/2012     CKD (chronic kidney disease) stage 3, GFR 30-59 ml/min (H) 4/4/2012     " "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      Diverticulosis of sigmoid colon 12/21/2013     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     Received Rituxan during Summer of 2016     H/O esophageal varices      Hearing loss      Heart murmur 4/4/2012     History of DVT (deep vein thrombosis)      History of [facility] fever      History of thyroid cancer 9/25/2012     Hyperlipidemia 4/10/2012    Says that she does not have it anymore, not on meds     Hyperlipidemia LDL goal <70      Hypertension goal BP (blood pressure) < 140/80 11/06/2013     Hypertriglyceridemia      Liver replaced by transplant (H) 10/17/2011    Dr. 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       Past Surgical History:   Procedure Laterality Date     APPENDECTOMY  1961     BIOPSY       CATARACT IOL, RT/LT      RE12/19/2013, LE12/10/2013 - Toric lenses     CHOLECYSTECTOMY  1991     COLONOSCOPY  3/10/2014    Procedure: COLONOSCOPY;;  Surgeon: Gentry Ramirez MD;  Location:  GI     ear drum repair       ENDOBRONCHIAL ULTRASOUND FLEXIBLE N/A 9/29/2017    Procedure: ENDOBRONCHIAL ULTRASOUND FLEXIBLE;  Flexible Bronchoscopy, Endobronchial Ultrasound, Transbronchial Needle Aspiration ;  Surgeon: Eden Clinton MD;  Location:  OR     ENDOSCOPIC RETROGRADE CHOLANGIOPANCREATOGRAM  9/19/2013    Procedure: ENDOSCOPIC RETROGRADE " CHOLANGIOPANCREATOGRAM;  Endoscopic Retrograde Cholangiopancreatogram with single balloon enteroscopy, ballon sweep of bile duct;  Surgeon: Brett Membreno MD;  Location: [facility] OR     HC KNEE SCOPE,MED/LAT MENISECTOMY  8/10/12    Right, partial medial menisectomy only     KNEE SURGERY  1966    R knee     PICC INSERTION  2013    4fr SL PASV PICC, 40cm (1cm external) in the R basilic vein w/ tip in the low SVC     PICC INSERTION  2014    5 fr DL BioFlo Navilyst PICC, 46 cm (3 cm external) in the L basilic vein w/ tip in the SVC RA junction.     THYROIDECTOMY  3/2010     TRANSPLANT LIVER RECIPIENT LIVING UNRELATED         Family History   Problem Relation Age of Onset     Hypertension Mother      Endometrial Cancer Mother      Hyperlipidemia Mother      Prostate Cancer Father      Macular Degeneration Father      Cancer - colorectal Maternal Grandmother         in her 80's, has surgery and removal of part of kidney,  at age 98     Heart Disease Maternal Grandfather          at 98     Glaucoma Maternal Grandfather      Cerebrovascular Disease Paternal Grandmother         in her 80's     Hypertension Paternal Grandmother      Heart Disease Paternal Grandfather         MI     Alzheimer Disease Paternal Grandfather      Allergies Son      Neurologic Disorder Daughter         Migraines     Breast Cancer Other      Anesthesia Reaction No family hx of      Crohn's Disease No family hx of      Ulcerative Colitis No family hx of        Social History     Social History Narrative    She is retired. [place] of [place]. 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     Social History     Tobacco Use     Smoking status: Former Smoker     Packs/day: 1.00     Years: 18.00     Pack years: 18.00     Types: Cigarettes     Last attempt to quit: 1985     Years since quittin.3     Smokeless tobacco: Never  "Used   Substance Use Topics     Alcohol use: Yes     Alcohol/week: 0.0 oz     Comment: rare - \"I toast at weddings\"     Drug use: No       Immunization History   Administered Date(s) Administered     [id number] Novel Flu P-free 11/10/2009     Influenza (High Dose) 3 valent vaccine 09/25/2015, 10/30/2016, 10/23/2017     Influenza (IIV3) PF 11/10/2004, 09/29/2007, 10/01/2010, 09/27/2012, 10/29/2012, 09/30/2013, 09/01/2016     Influenza Quad, Recombinant, p-free (RIV4) 10/20/2018     Pneumo Conj 13-V (2010&after) 02/26/2014     Pneumococcal 23 valent 12/01/2007, 05/25/2016     TD (ADULT, 7+) 01/01/2007     TDAP Vaccine (Adacel) 09/17/2007, 02/26/2014       Patient Active Problem List   Diagnosis     Bladder infection, chronic     Osteoarthritis of right knee     HDL deficiency     Advanced directives, counseling/discussion     Vitamin D deficiency     Status post tympanoplasty     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     Prophylactic antibiotic     High risk medication use     Statin intolerance     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     Sensorineural hearing loss (SNHL) of both ears     Abnormal liver function tests     Liver replaced by transplant (H)     Type 2 diabetes, HbA1c goal < 7% (H)     Hyperlipidemia LDL goal <70     Hypertension goal BP (blood pressure) < 140/80     Postoperative hypothyroidism     Type 2 diabetes mellitus with stage 3 chronic kidney disease, without long-term current use of insulin (H)     Age-related osteoporosis without current pathological fracture     Tympanic membrane perforation, left     Other infective chronic otitis externa of left ear     CKD (chronic kidney disease) stage 3, GFR 30-59 ml/min (H)     Pain of right thumb       Outpatient Medications Marked as Taking for the 8/9/19 encounter (Office Visit) with Crystal Montero MD   Medication Sig     acetaminophen 500 MG CAPS Take 1,000 mg by mouth nightly as needed Take 500-1,000 mg by mouth every 6 hours if needed.      alirocumab (PRALUENT) 150 MG/ML " injectable pen Inject 1 mL (150 mg) Subcutaneous every 14 days     benzoyl peroxide 5 % external liquid Use daily as directed     blood glucose monitoring (NO BRAND SPECIFIED) meter device kit Use to test blood sugar 2times daily or as directed.     blood glucose monitoring (NO BRAND SPECIFIED) test strip Use to test blood sugar 2 times daily, before breakfast and before bedtime     calcitRIOL (ROCALTROL) 0.5 MCG capsule Take 1 capsule (0.5 mcg) by mouth daily     ciprofloxacin-dexamethasone (CIPRODEX) otic suspension Place 4 drops Into the left ear three times a week     clotrimazole (LOTRIMIN) 1 % cream Apply topically 2 times daily as needed Reported on 4/25/2017     ELIQUIS 2.5 MG tablet Take 1 tablet (2.5 mg) by mouth 2 times daily     estradiol (ESTRACE VAGINAL) 0.1 MG/GM cream Place 2 g vaginally twice a week     ezetimibe (ZETIA) 10 MG tablet Take 1 tablet (10 mg) by mouth daily     ferrous gluconate (FERGON) 324 (38 Fe) MG tablet Take 1 tablet (324 mg) by mouth 3 times daily (with meals)     folic acid (FOLVITE) 1 MG tablet Take 1 tablet (1 mg) by mouth daily     furosemide (LASIX) 20 MG tablet Take 1 tablet (20 mg) by mouth daily     hydrALAZINE (APRESOLINE) 25 MG tablet Take 0.5 tablets (12.5 mg) by mouth 3 times daily as needed , if systolic blood pressure is more than 140 mmHg.     Hypromellose (ARTIFICIAL TEARS OP) Apply 1 drop to eye as needed     levothyroxine (SYNTHROID/LEVOTHROID) 175 MCG tablet Take 1 tablet (175 mcg) by mouth daily     losartan (COZAAR) 25 MG tablet Take 1 tablet (25 mg) by mouth daily     meclizine (ANTIVERT) 25 MG tablet Take 1 tablet (25 mg) by mouth as needed     metoprolol succinate (TOPROL-XL) 50 MG 24 hr tablet Take 1 tablet (50 mg) by mouth daily     metroNIDAZOLE (METROCREAM) 0.75 % cream Apply topically 2 times daily     metroNIDAZOLE (METROGEL) 0.75 % external gel Apply topically 2 times daily Put on face     Multiple Vitamins-Minerals (PRESERVISION AREDS 2) CAPS Take  1 capsule by mouth 2 times daily     omega-3 acid ethyl esters (LOVAZA) 1 g capsule Take 1 capsule (1 g) by mouth 2 times daily     order for DME Equipment being ordered: right brace with thumb     posaconazole (NOXAFIL) 100 MG EC tablet Take 3 tablets (300 mg) by mouth every morning     predniSONE (DELTASONE) 5 MG tablet Take 1 tablet (5 mg) by mouth daily     RAPAMUNE (BRAND) 1 MG tablet Take 1 tablet (1 mg) by mouth every other day     SUMAtriptan (IMITREX) 50 MG tablet Take 1 tablet (50 mg) by mouth at onset of headache for migraine repeat after 2 hours if needed.     triamcinolone (KENALOG) 0.025 % cream Apply topically 2 times daily To eyelids     triamcinolone (KENALOG) 0.1 % cream Apply topically 2 times daily as needed for irritation     ursodiol (ACTIGALL) 250 MG tablet TAKE 1 TABLET BY MOUTH TWICE A DAY     Wheat Dextrin (BENEFIBER) POWD 2 teaspoons daily     Current Facility-Administered Medications for the 8/9/19 encounter (Office Visit) with Crystal Montero MD   Medication     ceFAZolin (ANCEF) injection 500 mg     cefTRIAXone (ROCEPHIN) injection 250 mg     penicillin g potassium [id number] unit vial INTRADERMAL       Allergies   Allergen Reactions     Fluconazole Hives and Itching     Ciprofloxacin Anxiety and Other (See Comments)     Irregular heart beat     Azithromycin Itching     Benadryl [Diphenhydramine Hcl]      Insomnia      Cellcept Diarrhea     Ciprofloxacin Other (See Comments)     Insomnia, mood lability     Codeine      Psych disturbance     Codeine      Diphenhydramine Other (See Comments)     Doxycycline      Lansoprazole Diarrhea     Levaquin [Levofloxacin] Other (See Comments)     Headache, hyperactivity     Lisinopril Cough     Methotrexate      Sores     Morphine Sulfate Itching     Mycophenolate Diarrhea     No Clinical Screening - See Comments      Simvastatin Muscle Pain (Myalgia)     severe     Cephalexin Rash     Fever and skin burning     Penicillin G Itching and Rash      "Tolectin [Nsaids] Rash     Tramadol Rash            Physical Exam:   Vitals were reviewed.  All vitals stable.  BP (!) 172/79   Pulse 76   Temp 98  F (36.7  C) (Oral)   Ht 1.715 m (5' 7.5\")   Wt 85.1 kg (187 lb 11.2 oz)   LMP 06/01/1988 (Approximate)   SpO2 98%   BMI 28.96 kg/m     Wt Readings from Last 4 Encounters:   08/09/19 85.1 kg (187 lb 11.2 oz)   08/07/19 85.4 kg (188 lb 4.8 oz)   08/07/19 85.4 kg (188 lb 4.8 oz)   08/05/19 85.3 kg (188 lb)       Physical Examination:  GENERAL:  well-developed, well-nourished woman, in no acute distress.  HEENT:  Head is normocephalic, atraumatic   EYES:  Eyes have anicteric sclerae.   ENT: Hearing aids in place today. Dentures in place, no thrush. Mouth is very dry, with dry debris.   NECK:  Supple.   LUNGS: Clear to auscultation bilateral.   CARDIOVASCULAR: Regular rate and rhythm with 2/6 systolic murmur   ABDOMEN: Normal bowel sounds, not tender. Subcostal surgical scar not otherwise examined.  SKIN:  No acute rash. She is not wearing stockings. Onychomycosis not examined today.   NEUROLOGIC:  Grossly nonfocal. Active x4 extremities         Laboratory Data:     Absolute CD4   Date Value Ref Range Status   08/19/2014 415 mm3 Final   09/16/2013 1,266 mm3 Final   09/15/2013 DUPLICATE,TESTING DONE ON SPECIMEN FROM 9.16.13 mm3 Final   11/13/2008 1332 mm3 Final       Inflammatory Markers    Recent Labs   Lab Test 05/20/19  0957 07/30/18  0855 09/03/17  0912 09/02/17  0648 09/01/17  0832 05/14/16  0659 05/13/16  0940 09/23/14  0810 08/19/14  1530  06/30/14  0825 05/15/14  1112 05/08/14  0806   SED 47* 73*  --   --   --   --  67* 79* 76*  --  51* 58* 51*   CRP <2.9 5.2 64.0* 71.0* 51.0* 21.0* 19.0* 6.1 29.8*   < > 12.5* 8.0 <5.0    < > = values in this interval not displayed.       Immune Globulin Studies    Recent Labs   Lab Test 08/05/19  1552 08/19/14  1530 05/21/12  0754   IGG 1,110 1,220 1070     --  97   IGE  --  46  --      --  85   IGG1  --  684  --  "   IGG2  --  441  --    IGG3  --  70  --    IGG4  --  19  --        Metabolic Studies       Recent Labs   Lab Test 08/05/19  1552 05/20/19  0957 12/28/18 10/16/18  0905 10/16/18  0902 07/30/18  0913 05/19/18  0629  04/17/18  0942  05/10/17  0929  05/13/16  0940  07/23/14  1119    138 140  --  138 140 136   < > 138   < > 138   < > 132*   < > 137   POTASSIUM 2.9* 3.8 3.9  --  3.8 3.3* 4.1   < > 3.9   < > 4.2   < > 3.6   < > 3.8   CHLORIDE 99 105 99  --  102 102 103   < > 102   < > 102   < > 96   < > 98   CO2 30 26 28  --  27 27 27   < > 28   < > 26   < > 26   < > 27   ANIONGAP 10 7 12  --  9 11 6   < > 8   < > 10   < > 10   < > 12   BUN 36* 23 16  13.6  --  26 26 20   < > 36*   < > 36*   < > 29   < > 35*   CR 1.41* 1.14* 1.18  --  1.19* 1.11* 1.29*   < > 1.74*   < > 1.53*   < > 1.64*   < > 1.70*   GFRESTIMATED 38* 49* 47*  --  45* 49* 41*   < > 29*   < > 34*   < > 31*   < > 30*   * 94 103*  --  102* 79 103*   < > 125*   < > 104*   < > 98   < > 109*   A1C  --   --   --  6.4*  --   --   --    < >  --   --   --   --   --   --   --    KEILY 8.5 8.9 8.9  --  8.9 8.7 8.5   < > 9.1   < > 9.7   < > 9.1   < > 9.1   PHOS 3.3  --   --   --   --   --   --   --   --   --  3.1   < >  --    < >  --    MAG 2.2  --   --   --   --   --   --   --  2.8*   < > 2.8*   < > 2.2   < >  --    LACT  --   --   --   --   --   --   --   --   --   --   --   --  0.8  --  1.7    < > = values in this interval not displayed.       Hepatic Studies    Recent Labs   Lab Test 08/05/19  1552 05/20/19  0957 12/28/18 10/16/18  0902 07/30/18  0913 05/14/18  1019   BILITOTAL 0.3 0.2 0.2 0.2 0.2 0.2   ALKPHOS 213* 197* 227* 238* 276* 240*   ALBUMIN 3.0* 3.2* 3.8 3.0* 2.8* 3.1*   AST 25 20 20 19 25 23   ALT 40 36 19 23 37 43       Lipid testing    Recent Labs   Lab Test 05/20/19  0957 12/28/18 10/16/18  0902  09/18/15  0954   CHOL 185 225* 300*   < > 181   HDL 65 47 52   < > 63   LDL 91 110 194*   < > 84   TRIG 146 339* 269*   < > 172*   CHOLHDLRATIO   --  4.8*  --   --  2.9    < > = values in this interval not displayed.       Gout Labs      Recent Labs   Lab Test 08/05/19  1552 12/31/13  1443 07/30/13  1441   URIC 5.6 6.7 6.7       Hematology Studies      Recent Labs   Lab Test 08/05/19  1552 05/20/19  0957 10/16/18  0902 07/30/18  0913 05/19/18  0629 05/18/18  0731  09/02/17  0648 09/01/17  0832 08/31/17  1306  05/16/16  0827  05/14/16  0659   WBC 9.1 8.4 7.2 7.4 6.1 8.0   < > 10.2 9.4 10.6   < > 5.7   < > 4.7   ANEU  --   --   --   --   --  5.0  --  7.4 6.5 8.6*  --  2.5  --  2.1   ALYM  --   --   --   --   --  1.9  --  1.7 1.7 1.3  --  2.4  --  1.6   KATHY  --   --   --   --   --  1.0  --  0.9 1.0 0.7  --  0.6  --  0.9   AEOS  --   --   --   --   --  0.1  --  0.2 0.1 0.0  --  0.1  --  0.1   HGB 11.5* 11.9 10.3* 10.7* 10.3* 11.6*   < > 10.5* 11.1* 12.4   < > 11.1*   < > 10.0*   HCT 35.7 37.2 33.1* 33.8* 33.0* 36.0   < > 34.0* 35.0 38.5   < > 35.4   < > 31.8*    366 385 327 302 324   < > 259 269 290   < > 256   < > 230    < > = values in this interval not displayed.       Iron Testing    Recent Labs   Lab Test 08/05/19  1552  07/30/18  0855  07/11/13  0814  12/22/12  1346  12/20/12 2005 12/07/12  1345   IRON 56  --  39  --  58   < >  --   --   --  48     --  307  --  285   < >  --   --   --  156*   IRONSAT 21  --  13*  --  20   < >  --   --   --  30   LAURA 118  --  18   < > 195   < >  --   --   --  317*   MCV 88   < >  --    < > 88   < > 100   < > 103*  --    B12  --   --   --   --   --   --   --   --   --  281   [id number]  --   --   --   --   --   --   --   --   --  272*   HAPT  --   --   --   --   --   --   --   --  137  --    RETP  --   --   --   --   --   --  2.7*  --   --   --    RETICABSCT  --   --   --   --   --   --  71.0  --   --   --     < > = values in this interval not displayed.       Clotting Studies    Recent Labs   Lab Test 05/18/18  0731 05/16/16  0827 05/13/16  0940 11/06/13  1246  06/19/11  1815   INR 1.06 1.02 1.11 0.96   < > 1.08    PTT 33  --  33  --   --  28    < > = values in this interval not displayed.       Thyroid Studies     Recent Labs   Lab Test 05/20/19  0957 10/16/18  0902 07/30/18  0855 07/13/17  0843 05/10/17  0929  09/23/14  0810   TSH 12.11* 18.39* 8.64* 3.66 4.74*   < > 2.87   T4 0.99 0.85 1.02 1.59* 1.48*   < > 1.29  9.1   T3  --   --  44*  --   --   --  65    < > = values in this interval not displayed.       Urine Studies     Recent Labs   Lab Test 08/05/19  1552 05/22/19  1212 10/16/18  0925 07/30/18  0852 05/02/18  1008 08/31/17  2100 08/22/17  0913   URINEPH 5.5 5.5 6.0 6.0 6.0 8.0* 7.0   NITRITE Negative Neg Negative Negative Neg Negative Negative   LEUKEST Negative Small Small* Moderate* Trace Small* Trace*   WBCU 0 - 5  --  0 - 5 5-10*  --  2 2-5*       Medication levels    Recent Labs   Lab Test 05/20/19  0957  10/23/17  1025  09/15/13  0435  12/21/12  1344   VANCOMYCIN  --   --   --   --  26.3  --   --    VCON 0.4*   < >  --   --   --   --   --    CYCLSP  --   --  Canceled, Test credited   < >  --   --   --    RAPAMY 5.5   < >  --    < >  --    < >  --    MPACID  --   --   --   --   --   --  5.42*   MPAG  --   --   --   --   --   --  83.5    < > = values in this interval not displayed.       Microbiology:    [facility]:        Last Culture results with specimen source  Culture Micro   Date Value Ref Range Status   09/27/2018 Moderate growth  Staphylococcus aureus   (A)  Final   07/30/2018   Final    50,000 to 100,000 colonies/mL  mixed urogenital louann  Susceptibility testing not routinely done     07/18/2016 (A)  Final    50,000 to 100,000 colonies/mL Klebsiella pneumoniae   05/21/2016 No growth  Final   05/21/2016 No growth  Final   05/16/2016 Canceled, Test credited Duplicate request  Final   05/16/2016 Canceled, Test credited Duplicate request  Final   05/16/2016 No growth  Final   05/16/2016 No growth  Final   05/13/2016 No growth after 29 days  Final   05/13/2016   Final    Canceled, Test credited  Quantity not sufficient Notification of test cancellation   was given to MATTHEW FROM [facility], HE WILL REORDER AND RECOLLECT     05/13/2016 No growth  Final   05/13/2016 No growth  Final   05/13/2016   Final    10,000 to 50,000 colonies/mL mixed urogenital louann  Susceptibility testing not routinely done     05/13/2016 No growth  Final   09/25/2014 (A)  Final    Normal louann  Moderate growth Haemophilus influenzae Beta lactamase negative     09/03/2014 (A)  Final    Normal louann  Heavy growth Haemophilus influenzae Beta lactamase negative  beta lactamase negative isolates are susceptible to ampicillin,   amoxacillin/clavulanic, levofloxacin and ceftriaxone     08/19/2014 No growth  Final   08/19/2014 No growth  Final    Specimen Description   Date Value Ref Range Status   09/27/2018 Right Hand Wound  Final   07/30/2018 Midstream Urine  Final   07/18/2016 Midstream Urine  Final   05/21/2016 Blood Right Arm  Final   05/21/2016 Blood Left Arm  Final   05/16/2016 Blood  Final   05/16/2016 Blood  Final   05/16/2016 Blood Left Arm  Final   05/16/2016 Blood Right Arm  Final   05/13/2016 Blood Unspecified Site  Final   05/13/2016 Blood Unspecified Site  Final   05/13/2016 Blood Right Arm  Final   05/13/2016 Blood Right Arm  Final   05/13/2016 Midstream Urine  Final   05/13/2016 Nasal  Final   05/13/2016 Blood Venipuncture Collection VPT  Final   09/25/2014 Sputum  Final   09/25/2014 Sputum  Final   09/03/2014 Sputum  Final   09/03/2014 Sputum  Final        Last check of C difficile  C Diff Toxin B PCR   Date Value Ref Range Status   05/17/2012   Final    Negative: Clostridium difficile target DNA sequences NOT detected, presumed   negative for Clostridium difficile toxin B or the number of bacteria present   may be below the limit of detection for the test.   FDA approved assay performed using [employer] GeneXpert real-time PCR.   A negative result does not exclude actual disease due to Clostridium difficile   and may be due  to improper collection, handling and storage of the specimen or   the number of organisms in the specimen is below the detection limit of the   assay.       Virology:  CMV Quantitative   Date Value Ref Range Status   08/19/2014 <100 <100 Copies/mL Final   08/15/2013 <100 <100 Copies/mL Final   11/11/2008 <100 <100 Copies/mL Final     Comment:     No CMV DNA detected.   08/21/2007 <100 <100 Copies/mL Final     Comment:     No CMV DNA detected.   01/10/2007 <100 <100 Copies/mL Final     Comment:     No CMV DNA detected.       EBV DNA Copies/mL   Date Value Ref Range Status   07/30/2018 2,166 (A) EBVNEG^EBV DNA Not Detected [Copies]/mL Final   08/22/2017 EBV DNA Not Detected EBVNEG^EBV DNA Not Detected [Copies]/mL Final   04/11/2017 (A) EBVNEG [Copies]/mL Final    <500  EBV DNA Detected below the reportable range of 500 Copies/mL     12/09/2016 1,219 (A) EBVNEG [Copies]/mL Final   08/08/2016 29,569 (A) EBVNEG [Copies]/mL Final   07/26/2016 11,522 (A) EBVNEG [Copies]/mL Final   05/24/2016 24,676 (A) EBVNEG [Copies]/mL Final   05/13/2016 19,224 (A) EBVNEG [Copies]/mL Final   11/20/2015 25,676 (A) EBVNEG [Copies]/mL Final   09/14/2015 48,332 (A) EBVNEG [Copies]/mL Final   07/20/2015 48,923 (A) EBVNEG [Copies]/mL Final   09/15/2013 <1000 <1000 Copies/mL Final   08/15/2013 <1000 <1000 Copies/mL Final   11/12/2008 <1000 <1000 Copies/mL Final       BK viral loads   Recent Labs   Lab Test 07/28/11  1150   BKSPEC Urine   BKRES <1000       Imaging   CT CHEST W/O CONTRAST 6/27/2018 3:15 PM  Comparison: CT chest 3/27/2018, 7/31/2017, 4/24/2017; PET CT 8/8/2017   Findings: Chest: Heart size within normal limits. No pericardial effusion. The  thoracic aorta and pulmonary artery are normal caliber. No enlarged  thoracic lymph nodes by CT short axis size criteria.  Central airways are patent. No pleural effusion or pneumothorax.  Innumerable calcified granulomas, mostly clustered in the right lung  base, nicely change from the prior  CT. There is a cavitary 12 mm  nodule in the lingula, which previously measured 1.2 cm on 3/27/2018  (however this measured 8 mm on 4/24/2017). 4 mm pulmonary nodule in  the left lower lobe (series 6 image 155) which is unchanged from 4/24/2017.  Upper abdomen: Limited evaluation of the upper abdomen. Unchanged  pneumobilia. Postsurgical changes of liver transplant. Atrophic  kidneys. Adrenal glands are normal.        Impression:   1. Unchanged 12 mm cavitary nodule in the lingula since 3/27/2018.  However, this nodule is increased in size since 4/24/2017 when it  measured 8 mm and was FDG avid on the PET/CT 8/8/2017. Recommend  continued close follow-up (3-6 months) and/or soft tissue biopsy.  2. Extensive granulomatous disease mostly clustered in the right lower lobe.

## 2019-08-09 NOTE — TELEPHONE ENCOUNTER
Talked to the patient about her frustration with Dr. Rai. Virginia would like to more involved with the decision making of what she needs to be tested for. She does not want to be told what needs to be done. I told Virginia that I would talk to Dr. Rai. Writer will talk to Dr. Rai and call this patient back the week of 8/19/19.    NORMA José

## 2019-08-09 NOTE — TELEPHONE ENCOUNTER
[facility] Call Center    Phone Message    May a detailed message be left on voicemail: no    Reason for Call: Other: Pt's pharmacy is calling again, they are eager to get this question answered. Please call them back.     Action Taken: Message routed to:  [facility] ([facility]): [facility] CARDIOLOGY ADULT [facility]

## 2019-08-12 RX ORDER — HYDRALAZINE HYDROCHLORIDE 25 MG/1
12.5 TABLET, FILM COATED ORAL 3 TIMES DAILY PRN
Qty: 90 TABLET | Refills: 3 | Status: ON HOLD | OUTPATIENT
Start: 2019-08-12 | End: 2019-08-31

## 2019-08-12 RX ORDER — OMEGA-3-ACID ETHYL ESTERS 1 G/1
1 CAPSULE, LIQUID FILLED ORAL 2 TIMES DAILY
Qty: 180 CAPSULE | Refills: 3 | Status: ON HOLD | OUTPATIENT
Start: 2019-08-12 | End: 2019-08-31

## 2019-08-12 NOTE — TELEPHONE ENCOUNTER
[facility] Call Center    Phone Message    May a detailed message be left on voicemail: yes    Reason for Call: Medication Question or concern regarding medication   Prescription Clarification  Name of Medication: hydrALAZINE (APRESOLINE) 25 MG tablet    AND omega-3 acid ethyl esters (LOVAZA) 1 g capsule  Prescribing Provider: Britt Oswald   Pharmacy: [facility] in [place]   What on the order needs clarification? 3RD REQUEST FROM PHARMACY TO CALL BACK ON THESE TWO SCRIPTS---Per the pharmacy, something does not add up.  Please call this pharmacy back asap.          Action Taken: Message routed to:  [facility] ([facility]):  CARDIOLOGY ---[facility] MED REFILL TEAM FOR CARDIOLOGY

## 2019-08-12 NOTE — TELEPHONE ENCOUNTER
Pharmacy was called qty changed on Lovasa to 180 for 90 days with 3 refills and Hydralazine was changed to a qty of 90 for 90 days with 3 refills.      Kathleen M Doege RN

## 2019-08-12 NOTE — TELEPHONE ENCOUNTER
Prior Authorization Not Needed per Insurance    Medication: Omega-3-acid Ethyl Esters 1 GM Capsules - NOT NEEDED  Insurance Company: [employer] - Phone [phone] Fax [phone]  Expected CoPay:      Pharmacy Filling the Rx: [id number] - [place] - [address] [address]  Pharmacy Notified: Yes  Patient Notified: Comment:  **Instructed pharmacy to notify patient when script is ready to /ship.**

## 2019-08-13 ENCOUNTER — OFFICE VISIT (OUTPATIENT)
Dept: ORTHOPEDICS | Facility: CLINIC | Age: 69
End: 2019-08-13
Payer: MEDICARE

## 2019-08-13 VITALS — SYSTOLIC BLOOD PRESSURE: 138 MMHG | HEART RATE: 78 BPM | OXYGEN SATURATION: 98 % | DIASTOLIC BLOOD PRESSURE: 61 MMHG

## 2019-08-13 DIAGNOSIS — M18.11 PRIMARY OSTEOARTHRITIS OF FIRST CARPOMETACARPAL JOINT OF RIGHT HAND: Primary | ICD-10-CM

## 2019-08-13 PROCEDURE — 20604 DRAIN/INJ JOINT/BURSA W/US: CPT | Mod: LT | Performed by: PREVENTIVE MEDICINE

## 2019-08-13 PROCEDURE — 99207 ZZC NO CHARGE LOS: CPT | Performed by: PREVENTIVE MEDICINE

## 2019-08-13 RX ADMIN — TRIAMCINOLONE ACETONIDE 40 MG: 40 INJECTION, SUSPENSION INTRA-ARTICULAR; INTRAMUSCULAR at 09:59

## 2019-08-13 NOTE — NURSING NOTE
[facility] FOLLOW-UP VISIT 8/13/2019    Luz Thompson's chief complaint for this visit includes:  Chief Complaint   Patient presents with     Right Thumb - Arthritis     Discuss injection     PCP: Jennifer Barraza    Referring Provider:  No referring provider defined for this encounter.    /61 (BP Location: Left arm, Patient Position: Sitting, Cuff Size: Adult Large)   Pulse 78   LMP 06/01/1988 (Approximate)   SpO2 98%         Interval History:     Follow up reason: right thumb arthritis    Date last seen: 7/31/19    Following Therapeutic Plan: n/a     Pain: Improving    Function: Improving      Medical History:    Any recent changes to your medical history? No    Any new medication prescribed since last visit? No    Review of Systems:    Do you have fever, chills, weight loss? No    Do you have any vision problems? No    Do you have any chest pain or edema? No    Do you have any shortness of breath or wheezing?  No    Do you have stomach problems? No    Do you have any numbness or focal weakness? No    Do you have diabetes? Yes, followed by endocrinology    Do you have problems with bleeding or clotting? Yes, eliquis    Do you have an rashes or other skin lesions? No

## 2019-08-13 NOTE — PROGRESS NOTES
Small Joint Injection/Arthrocentesis: R thumb CMC  Date/Time: 8/13/2019 9:59 AM  Performed by: Samson Griffith MD  Authorized by: Samson Griffith MD     Needle Size:  25 G  Guidance: ultrasound       Location:  Thumb    Site:  R thumb CMC                    Medications:  40 mg triamcinolone 40 MG/ML        Outcome:  Tolerated well, no immediate complications      Timeout: timeout called immediately prior to procedure    Prep: patient was prepped and draped in usual sterile fashion       2 mL Bupivicaine 0.25% preservative free injection (25 mg per 10 mL SDV)  Lot#[id number] Exp 04/30/2021  NDC#91597-511-69  8 mL wasted        Kenalog 40 mg NDC#[address]-1

## 2019-08-13 NOTE — PATIENT INSTRUCTIONS
Thanks for coming today.  [facility]  [address] [place] [address]    To schedule future appointments in Ortho Clinic, you may call [phone].    To schedule ordered imaging by your provider:   Call Central Imaging [phone]    To schedule an injection ordered by your provider:  Call Central Imaging Injection scheduling line: [phone]  [facility] available online at:  [email or web address]    Please call if any further questions or concerns ([phone]).  Clinic hours 8 am to 5 pm.    Return to clinic (call) if symptoms worsen or fail to improve.

## 2019-08-13 NOTE — PROGRESS NOTES
Virginia is here for CMC injection in right thumb as previously discussed.  Diagnosis: right thumb cmc arthritis    PROCEDURE:     Right cmc joint injection     The patient was apprised of the risks and the benefits of the procedure and consented and a written consent was signed.   The patient s thumb was sterilely prepped with chloraprep.   40 mg of triamcinolone suspension was drawn up into a 5 mL syringe with 2 mL of bupivicane 0.25%  The patient was injected with a 1.5-inch 25-gauge needle at the cmc joint  There were no complications. The patient tolerated the procedure well. There was minimal bleeding.   The patient was instructed to ice the thumb upon leaving clinic and refrain from overuse over the next 3 days.   The patient was instructed to go to the emergency room with any usual pain, swelling, or redness occurred in the injected area.   The patient was given a followup appointment to evaluate response to the injection to their increased range of motion and reduction of pain.  Follow up PRN  Dr Samson Griffith

## 2019-08-13 NOTE — LETTER
8/13/2019         RE: Luz Thompson  [address]  [place] [id number]        Dear Colleague,    Thank you for referring your patient, Luz Thompson, to the [facility]. Please see a copy of my visit note below.    Virginia is here for CMC injection in right thumb as previously discussed.  Diagnosis: right thumb cmc arthritis    PROCEDURE:     Right cmc joint injection     The patient was apprised of the risks and the benefits of the procedure and consented and a written consent was signed.   The patient s thumb was sterilely prepped with chloraprep.   40 mg of triamcinolone suspension was drawn up into a 5 mL syringe with 2 mL of bupivicane 0.25%  The patient was injected with a 1.5-inch 25-gauge needle at the cmc joint  There were no complications. The patient tolerated the procedure well. There was minimal bleeding.   The patient was instructed to ice the thumb upon leaving clinic and refrain from overuse over the next 3 days.   The patient was instructed to go to the emergency room with any usual pain, swelling, or redness occurred in the injected area.   The patient was given a followup appointment to evaluate response to the injection to their increased range of motion and reduction of pain.  Follow up PRN  Dr Samson Griffith    Small Joint Injection/Arthrocentesis: R thumb CMC  Date/Time: 8/13/2019 9:59 AM  Performed by: Samson Griffith MD  Authorized by: Samson Griffith MD     Needle Size:  25 G  Guidance: ultrasound       Location:  Thumb    Site:  R thumb CMC                    Medications:  40 mg triamcinolone 40 MG/ML        Outcome:  Tolerated well, no immediate complications      Timeout: timeout called immediately prior to procedure    Prep: patient was prepped and draped in usual sterile fashion       2 mL Bupivicaine 0.25% preservative free injection (25 mg per 10 mL SDV)  Lot#[id number] Exp 04/30/2021  NDC#32937-738-97  8 mL  wasted        Kenalog 40 mg NDC#[address]-4                  Again, thank you for allowing me to participate in the care of your patient.        Sincerely,        Samson Griffith MD

## 2019-08-14 ENCOUNTER — TELEPHONE (OUTPATIENT)
Dept: ENDOCRINOLOGY | Facility: CLINIC | Age: 69
End: 2019-08-14

## 2019-08-14 NOTE — TELEPHONE ENCOUNTER
Forms received from: Bhavya     Forms were [facility] for diabetic testing supplies     Faxed Date:8/19/19

## 2019-08-14 NOTE — TELEPHONE ENCOUNTER
Spoke to pharmacist. Someone named Kiesha called on Monday 8-14-19 and cleared up the issue. I verified that Oswald's directions matched what the pharmacy did.  Patient has picked up the medication with the correct directions, doses, quantities.

## 2019-08-19 ENCOUNTER — MEDICAL CORRESPONDENCE (OUTPATIENT)
Dept: HEALTH INFORMATION MANAGEMENT | Facility: CLINIC | Age: 69
End: 2019-08-19

## 2019-08-19 ENCOUNTER — TELEPHONE (OUTPATIENT)
Dept: TRANSPLANT | Facility: CLINIC | Age: 69
End: 2019-08-19

## 2019-08-19 DIAGNOSIS — R19.7 DIARRHEA: Primary | ICD-10-CM

## 2019-08-19 NOTE — TELEPHONE ENCOUNTER
Pt left voicmail stating she thinks she may have cdiff. Left message for her to return call to further discuss and talk about stool cultures and where she would like them faxed.

## 2019-08-22 ENCOUNTER — OFFICE VISIT (OUTPATIENT)
Dept: ORTHOPEDICS | Facility: CLINIC | Age: 69
End: 2019-08-22
Payer: MEDICARE

## 2019-08-22 ENCOUNTER — ANCILLARY PROCEDURE (OUTPATIENT)
Dept: GENERAL RADIOLOGY | Facility: CLINIC | Age: 69
End: 2019-08-22
Attending: PREVENTIVE MEDICINE
Payer: MEDICARE

## 2019-08-22 VITALS — BODY MASS INDEX: 28.34 KG/M2 | HEIGHT: 68 IN | WEIGHT: 187 LBS

## 2019-08-22 DIAGNOSIS — S99.922A TOE INJURY, LEFT, INITIAL ENCOUNTER: Primary | ICD-10-CM

## 2019-08-22 DIAGNOSIS — M18.11 PRIMARY OSTEOARTHRITIS OF FIRST CARPOMETACARPAL JOINT OF RIGHT HAND: ICD-10-CM

## 2019-08-22 DIAGNOSIS — S99.922A TOE INJURY, LEFT, INITIAL ENCOUNTER: ICD-10-CM

## 2019-08-22 DIAGNOSIS — S90.122A CONTUSION OF LESSER TOE OF LEFT FOOT WITHOUT DAMAGE TO NAIL, INITIAL ENCOUNTER: ICD-10-CM

## 2019-08-22 PROCEDURE — 73630 X-RAY EXAM OF FOOT: CPT | Mod: LT | Performed by: RADIOLOGY

## 2019-08-22 PROCEDURE — 99213 OFFICE O/P EST LOW 20 MIN: CPT | Performed by: PREVENTIVE MEDICINE

## 2019-08-22 ASSESSMENT — PAIN SCALES - GENERAL: PAINLEVEL: NO PAIN (1)

## 2019-08-22 ASSESSMENT — MIFFLIN-ST. JEOR: SCORE: 1408.79

## 2019-08-22 NOTE — PROGRESS NOTES
HISTORY OF PRESENT ILLNESS  Ms. Thompson is a pleasant 69 year old year old female who presents to clinic today for followup for cmc arthritis, had injection last visit, thumb feels better using cool comfort brace  Will plan to use stiff brace made by hand therapy at night  Also had new complaint of left toe pain after stubbing it on a metal railing while wearing slippers a few days ago  Has swelling and pain over 4th and 5th toes    MEDICAL HISTORY  Patient Active Problem List   Diagnosis     Bladder infection, chronic     Osteoarthritis of right knee     HDL deficiency     Advanced directives, counseling/discussion     Vitamin D deficiency     Status post tympanoplasty     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     Prophylactic antibiotic     High risk medication use     Statin intolerance     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     Sensorineural hearing loss (SNHL) of both ears     Abnormal liver function tests     Liver replaced by transplant (H)     Type 2 diabetes, HbA1c goal < 7% (H)     Hyperlipidemia LDL goal <70     Hypertension goal BP (blood pressure) < 140/80     Postoperative hypothyroidism     Type 2 diabetes mellitus with stage 3 chronic kidney disease, without long-term current use of insulin (H)     Age-related osteoporosis without current pathological fracture     Tympanic membrane perforation, left     Other infective chronic otitis externa of left ear     CKD (chronic kidney disease) stage 3, GFR 30-59 ml/min (H)     Pain of right thumb       Current Outpatient Medications   Medication Sig Dispense Refill     acetaminophen 500 MG CAPS Take 1,000 mg by mouth nightly as needed Take 500-1,000 mg by mouth every 6 hours if needed.        alirocumab (PRALUENT) 150 MG/ML injectable pen Inject 1 mL (150 mg) Subcutaneous every 14 days 6 mL 3     ampicillin (OMNIPEN) 250 MG injection For allergy testing 1 each 0     azithromycin (ZITHROMAX) 500 MG vial For allergy testing 1 each 0     benzoyl peroxide 5 % external liquid Use daily as  directed 148 g 1     blood glucose (ACCU-CHEK GUIDE) test strip Use to test blood sugar 2 times daily or as directed. 100 strip 11     blood glucose monitoring (ACCU-CHEK FASTCLIX) lancets Use to test blood sugar 2 times daily or as directed. 102 each 11     blood glucose monitoring (NO BRAND SPECIFIED) meter device kit Use to test blood sugar 2times daily or as directed. 1 kit 0     blood glucose monitoring (NO BRAND SPECIFIED) test strip Use to test blood sugar 2 times daily, before breakfast and before bedtime 200 each 3     calcitRIOL (ROCALTROL) 0.5 MCG capsule Take 1 capsule (0.5 mcg) by mouth daily 90 capsule 3     cefTAZidime (FORTAZ) 1 GM vial For Allergy Testing in Allergy Clinic Only 1 each 0     ciprofloxacin-dexamethasone (CIPRODEX) otic suspension Place 4 drops Into the left ear three times a week 5.6 mL 6     clotrimazole (LOTRIMIN) 1 % cream Apply topically 2 times daily as needed Reported on 4/25/2017       doxycycline (VIBRAMYCIN) 100 mg vial to attach to  mL bag For allergy testing 10 mL 0     ELIQUIS 2.5 MG tablet Take 1 tablet (2.5 mg) by mouth 2 times daily 180 tablet 3     estradiol (ESTRACE VAGINAL) 0.1 MG/GM cream Place 2 g vaginally twice a week 42.5 g 11     estradiol (VAGIFEM) 10 MCG TABS vaginal tablet Place 1 tablet (10 mcg) vaginally twice a week 8 tablet 11     ezetimibe (ZETIA) 10 MG tablet Take 1 tablet (10 mg) by mouth daily 90 tablet 3     ferrous gluconate (FERGON) 324 (38 Fe) MG tablet Take 1 tablet (324 mg) by mouth 3 times daily (with meals) 90 tablet 0     fluconazole (DIFLUCAN) 200-0.9 MG/100ML-% intermittent infusion For allergy testing 100 mL 0     folic acid (FOLVITE) 1 MG tablet Take 1 tablet (1 mg) by mouth daily 100 tablet 3     furosemide (LASIX) 20 MG tablet Take 1 tablet (20 mg) by mouth daily 90 tablet 3     hydrALAZINE (APRESOLINE) 25 MG tablet Take 0.5 tablets (12.5 mg) by mouth 3 times daily as needed , if systolic blood pressure is more than 140 mmHg. 90  tablet 3     Hypromellose (ARTIFICIAL TEARS OP) Apply 1 drop to eye as needed       levothyroxine (SYNTHROID/LEVOTHROID) 175 MCG tablet Take 1 tablet (175 mcg) by mouth daily 90 tablet 3     losartan (COZAAR) 25 MG tablet Take 1 tablet (25 mg) by mouth daily       meclizine (ANTIVERT) 25 MG tablet Take 1 tablet (25 mg) by mouth as needed 30 tablet 11     methenamine (HIPREX) 1 g tablet Take 1 tablet (1 g) by mouth At Bedtime 30 tablet 11     metoprolol succinate (TOPROL-XL) 50 MG 24 hr tablet Take 1 tablet (50 mg) by mouth daily 90 tablet 3     metroNIDAZOLE (METROCREAM) 0.75 % cream Apply topically 2 times daily 45 g 5     metroNIDAZOLE (METROGEL) 0.75 % external gel Apply topically 2 times daily Put on face 45 g 3     Multiple Vitamins-Minerals (PRESERVISION AREDS 2) CAPS Take 1 capsule by mouth 2 times daily       nitroFURantoin, macrocrystal-monohydrate, (MACROBID) 100 MG capsule Take 1 capsule (100 mg) by mouth 2 times daily For one week at onset of UTI symptoms 14 capsule 6     omega-3 acid ethyl esters (LOVAZA) 1 g capsule Take 1 capsule (1 g) by mouth 2 times daily 180 capsule 3     order for DME Equipment being ordered: right brace with thumb 1 Device 0     posaconazole (NOXAFIL) 100 MG EC tablet Take 3 tablets (300 mg) by mouth every morning 90 tablet 11     predniSONE (DELTASONE) 20 MG tablet Take 1 tablet (20 mg) by mouth 2 times daily 10 tablet 0     predniSONE (DELTASONE) 5 MG tablet Take 1 tablet (5 mg) by mouth daily 90 tablet 3     RAPAMUNE (BRAND) 1 MG tablet Take 1 tablet (1 mg) by mouth every other day 45 tablet 3     sertraline (ZOLOFT) 100 MG tablet Take one half tablet by mouth daily for 2 weeks then increase to one tablet daily 30 tablet 0     sertraline (ZOLOFT) 100 MG tablet Take 1 tab (100mg) PO daily 90 tablet 0     SUMAtriptan (IMITREX) 50 MG tablet Take 1 tablet (50 mg) by mouth at onset of headache for migraine repeat after 2 hours if needed. 30 tablet 3     triamcinolone (KENALOG)  0.025 % cream Apply topically 2 times daily To eyelids 15 g 1     triamcinolone (KENALOG) 0.1 % cream Apply topically 2 times daily as needed for irritation       ursodiol (ACTIGALL) 250 MG tablet TAKE 1 TABLET BY MOUTH TWICE A  tablet 3     voriconazole (VFEND) 200 MG tablet Take 1 tablet (200 mg) by mouth 2 times daily 180 tablet 0     Wheat Dextrin (BENEFIBER) POWD 2 teaspoons daily         Allergies   Allergen Reactions     Fluconazole Hives and Itching     Azithromycin Itching     Benadryl [Diphenhydramine Hcl]      Insomnia      Cellcept Diarrhea     Ciprofloxacin Other (See Comments)     Insomnia, mood lability, Irregular heart beat, anxiety     Codeine      Psych disturbance     Diphenhydramine Other (See Comments)     Doxycycline      Lansoprazole Diarrhea     Levaquin [Levofloxacin] Other (See Comments)     Headache, hyperactivity     Lisinopril Cough     Methotrexate      Sores     Morphine Sulfate Itching     Mycophenolate Diarrhea     Simvastatin Muscle Pain (Myalgia)     severe     Cephalexin Rash     Fever and skin burning     Penicillin G Itching and Rash     Tolectin [Nsaids] Rash     Tramadol Rash       Family History   Problem Relation Age of Onset     Hypertension Mother      Endometrial Cancer Mother      Hyperlipidemia Mother      Prostate Cancer Father      Macular Degeneration Father      Cancer - colorectal Maternal Grandmother         in her 80's, has surgery and removal of part of kidney,  at age 98     Heart Disease Maternal Grandfather          at 98     Glaucoma Maternal Grandfather      Cerebrovascular Disease Paternal Grandmother         in her 80's     Hypertension Paternal Grandmother      Heart Disease Paternal Grandfather         MI     Alzheimer Disease Paternal Grandfather      Allergies Son      Neurologic Disorder Daughter         Migraines     Breast Cancer Other      Anesthesia Reaction No family hx of      Crohn's Disease No family hx of      Ulcerative  "Colitis No family hx of        Additional medical/Social/Surgical histories reviewed in [facility] and updated as appropriate.     REVIEW OF SYSTEMS (8/22/2019)  10 point ROS of systems including Constitutional, Eyes, Respiratory, Cardiovascular, Gastroenterology, Genitourinary, Integumentary, Musculoskeletal, Psychiatric were all negative except for pertinent positives noted in my HPI.     PHYSICAL EXAM  Vitals:    08/22/19 1206   Weight: 84.8 kg (187 lb)   Height: 1.715 m (5' 7.5\")     Vital Signs: Ht 1.715 m (5' 7.5\")   Wt 84.8 kg (187 lb)   LMP 06/01/1988 (Approximate)   BMI 28.86 kg/m   Patient declined being weighed. Body mass index is 28.86 kg/m .    General  - normal appearance, in no obvious distress  CV  - normal pulses at posterior tib and dorsalis pedis  Pulm  - normal respiratory pattern, non-labored  Musculoskeletal - left foot  - stance: normal gait without limp, normal stance without excessive pronation, normal heel inversion with standing heel raise, no obvious leg length discrepancy, normal heel and toe walk- with pain in left foot  - inspection: no swelling or effusion, except over base of 4th toe, and painful palpation, normal bone and joint alignment, no obvious deformity  - palpation: no bony or soft tissue tenderness, except base of 4th toe  - ROM: normal active and passive ROM of great and lesser toes, no pain with MT translation  - strength: 5/5 in all planes  Neuro  - no sensory or motor deficit, grossly normal coordination, normal muscle tone  Skin  - no ecchymosis, erythema, warmth, or induration, no obvious rash  Psych  - interactive, appropriate, normal mood and affect  Right thumb: has pain to palpation over cmc joint thumb right hand  ASSESSMENT & PLAN  68 yo female with left 4th toe contusion, and right cmc arthritis  Cont. Use of brace for hand  Buddy tape toes PRN  Ice PRN  Arnica gel recommended  F/u as needed    Samson Griffith MD, CAQSM  "

## 2019-08-22 NOTE — LETTER
8/22/2019         RE: Luz Thompson  [address]  [place] [address]        Dear Colleague,    Thank you for referring your patient, Luz Thompson, to the [facility]. Please see a copy of my visit note below.    HISTORY OF PRESENT ILLNESS  Ms. Thompson is a pleasant 69 year old year old female who presents to clinic today for followup for cmc arthritis, had injection last visit, thumb feels better using cool comfort brace  Will plan to use stiff brace made by hand therapy at night  Also had new complaint of left toe pain after stubbing it on a metal railing while wearing slippers a few days ago  Has swelling and pain over 4th and 5th toes    MEDICAL HISTORY  Patient Active Problem List   Diagnosis     Bladder infection, chronic     Osteoarthritis of right knee     HDL deficiency     Advanced directives, counseling/discussion     Vitamin D deficiency     Status post tympanoplasty     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     Prophylactic antibiotic     High risk medication use     Statin intolerance     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     Sensorineural hearing loss (SNHL) of both ears     Abnormal liver function tests     Liver replaced by transplant (H)     Type 2 diabetes, HbA1c goal < 7% (H)     Hyperlipidemia LDL goal <70     Hypertension goal BP (blood pressure) < 140/80     Postoperative hypothyroidism     Type 2 diabetes mellitus with stage 3 chronic kidney disease, without long-term current use of insulin (H)     Age-related osteoporosis without current pathological fracture     Tympanic membrane perforation, left     Other infective chronic otitis externa of left ear     CKD (chronic kidney disease) stage 3, GFR 30-59 ml/min (H)     Pain of right thumb       Current Outpatient Medications   Medication Sig Dispense Refill     acetaminophen 500 MG CAPS Take 1,000 mg by mouth nightly as needed Take 500-1,000 mg by mouth every 6 hours if needed.        alirocumab (PRALUENT) 150 MG/ML  injectable pen Inject 1 mL (150 mg) Subcutaneous every 14 days 6 mL 3     ampicillin (OMNIPEN) 250 MG injection For allergy testing 1 each 0     azithromycin (ZITHROMAX) 500 MG vial For allergy testing 1 each 0     benzoyl peroxide 5 % external liquid Use daily as directed 148 g 1     blood glucose (ACCU-CHEK GUIDE) test strip Use to test blood sugar 2 times daily or as directed. 100 strip 11     blood glucose monitoring (ACCU-CHEK FASTCLIX) lancets Use to test blood sugar 2 times daily or as directed. 102 each 11     blood glucose monitoring (NO BRAND SPECIFIED) meter device kit Use to test blood sugar 2times daily or as directed. 1 kit 0     blood glucose monitoring (NO BRAND SPECIFIED) test strip Use to test blood sugar 2 times daily, before breakfast and before bedtime 200 each 3     calcitRIOL (ROCALTROL) 0.5 MCG capsule Take 1 capsule (0.5 mcg) by mouth daily 90 capsule 3     cefTAZidime (FORTAZ) 1 GM vial For Allergy Testing in Allergy Clinic Only 1 each 0     ciprofloxacin-dexamethasone (CIPRODEX) otic suspension Place 4 drops Into the left ear three times a week 5.6 mL 6     clotrimazole (LOTRIMIN) 1 % cream Apply topically 2 times daily as needed Reported on 4/25/2017       doxycycline (VIBRAMYCIN) 100 mg vial to attach to  mL bag For allergy testing 10 mL 0     ELIQUIS 2.5 MG tablet Take 1 tablet (2.5 mg) by mouth 2 times daily 180 tablet 3     estradiol (ESTRACE VAGINAL) 0.1 MG/GM cream Place 2 g vaginally twice a week 42.5 g 11     estradiol (VAGIFEM) 10 MCG TABS vaginal tablet Place 1 tablet (10 mcg) vaginally twice a week 8 tablet 11     ezetimibe (ZETIA) 10 MG tablet Take 1 tablet (10 mg) by mouth daily 90 tablet 3     ferrous gluconate (FERGON) 324 (38 Fe) MG tablet Take 1 tablet (324 mg) by mouth 3 times daily (with meals) 90 tablet 0     fluconazole (DIFLUCAN) 200-0.9 MG/100ML-% intermittent infusion For allergy testing 100 mL 0     folic acid (FOLVITE) 1 MG tablet Take 1 tablet (1 mg) by  mouth daily 100 tablet 3     furosemide (LASIX) 20 MG tablet Take 1 tablet (20 mg) by mouth daily 90 tablet 3     hydrALAZINE (APRESOLINE) 25 MG tablet Take 0.5 tablets (12.5 mg) by mouth 3 times daily as needed , if systolic blood pressure is more than 140 mmHg. 90 tablet 3     Hypromellose (ARTIFICIAL TEARS OP) Apply 1 drop to eye as needed       levothyroxine (SYNTHROID/LEVOTHROID) 175 MCG tablet Take 1 tablet (175 mcg) by mouth daily 90 tablet 3     losartan (COZAAR) 25 MG tablet Take 1 tablet (25 mg) by mouth daily       meclizine (ANTIVERT) 25 MG tablet Take 1 tablet (25 mg) by mouth as needed 30 tablet 11     methenamine (HIPREX) 1 g tablet Take 1 tablet (1 g) by mouth At Bedtime 30 tablet 11     metoprolol succinate (TOPROL-XL) 50 MG 24 hr tablet Take 1 tablet (50 mg) by mouth daily 90 tablet 3     metroNIDAZOLE (METROCREAM) 0.75 % cream Apply topically 2 times daily 45 g 5     metroNIDAZOLE (METROGEL) 0.75 % external gel Apply topically 2 times daily Put on face 45 g 3     Multiple Vitamins-Minerals (PRESERVISION AREDS 2) CAPS Take 1 capsule by mouth 2 times daily       nitroFURantoin, macrocrystal-monohydrate, (MACROBID) 100 MG capsule Take 1 capsule (100 mg) by mouth 2 times daily For one week at onset of UTI symptoms 14 capsule 6     omega-3 acid ethyl esters (LOVAZA) 1 g capsule Take 1 capsule (1 g) by mouth 2 times daily 180 capsule 3     order for DME Equipment being ordered: right brace with thumb 1 Device 0     posaconazole (NOXAFIL) 100 MG EC tablet Take 3 tablets (300 mg) by mouth every morning 90 tablet 11     predniSONE (DELTASONE) 20 MG tablet Take 1 tablet (20 mg) by mouth 2 times daily 10 tablet 0     predniSONE (DELTASONE) 5 MG tablet Take 1 tablet (5 mg) by mouth daily 90 tablet 3     RAPAMUNE (BRAND) 1 MG tablet Take 1 tablet (1 mg) by mouth every other day 45 tablet 3     sertraline (ZOLOFT) 100 MG tablet Take one half tablet by mouth daily for 2 weeks then increase to one tablet daily 30  tablet 0     sertraline (ZOLOFT) 100 MG tablet Take 1 tab (100mg) PO daily 90 tablet 0     SUMAtriptan (IMITREX) 50 MG tablet Take 1 tablet (50 mg) by mouth at onset of headache for migraine repeat after 2 hours if needed. 30 tablet 3     triamcinolone (KENALOG) 0.025 % cream Apply topically 2 times daily To eyelids 15 g 1     triamcinolone (KENALOG) 0.1 % cream Apply topically 2 times daily as needed for irritation       ursodiol (ACTIGALL) 250 MG tablet TAKE 1 TABLET BY MOUTH TWICE A  tablet 3     voriconazole (VFEND) 200 MG tablet Take 1 tablet (200 mg) by mouth 2 times daily 180 tablet 0     Wheat Dextrin (BENEFIBER) POWD 2 teaspoons daily         Allergies   Allergen Reactions     Fluconazole Hives and Itching     Azithromycin Itching     Benadryl [Diphenhydramine Hcl]      Insomnia      Cellcept Diarrhea     Ciprofloxacin Other (See Comments)     Insomnia, mood lability, Irregular heart beat, anxiety     Codeine      Psych disturbance     Diphenhydramine Other (See Comments)     Doxycycline      Lansoprazole Diarrhea     Levaquin [Levofloxacin] Other (See Comments)     Headache, hyperactivity     Lisinopril Cough     Methotrexate      Sores     Morphine Sulfate Itching     Mycophenolate Diarrhea     Simvastatin Muscle Pain (Myalgia)     severe     Cephalexin Rash     Fever and skin burning     Penicillin G Itching and Rash     Tolectin [Nsaids] Rash     Tramadol Rash       Family History   Problem Relation Age of Onset     Hypertension Mother      Endometrial Cancer Mother      Hyperlipidemia Mother      Prostate Cancer Father      Macular Degeneration Father      Cancer - colorectal Maternal Grandmother         in her 80's, has surgery and removal of part of kidney,  at age 98     Heart Disease Maternal Grandfather          at 98     Glaucoma Maternal Grandfather      Cerebrovascular Disease Paternal Grandmother         in her 80's     Hypertension Paternal Grandmother      Heart Disease  "Paternal Grandfather         MI     Alzheimer Disease Paternal Grandfather      Allergies Son      Neurologic Disorder Daughter         Migraines     Breast Cancer Other      Anesthesia Reaction No family hx of      Crohn's Disease No family hx of      Ulcerative Colitis No family hx of        Additional medical/Social/Surgical histories reviewed in [facility] and updated as appropriate.     REVIEW OF SYSTEMS (8/22/2019)  10 point ROS of systems including Constitutional, Eyes, Respiratory, Cardiovascular, Gastroenterology, Genitourinary, Integumentary, Musculoskeletal, Psychiatric were all negative except for pertinent positives noted in my HPI.     PHYSICAL EXAM  Vitals:    08/22/19 1206   Weight: 84.8 kg (187 lb)   Height: 1.715 m (5' 7.5\")     Vital Signs: Ht 1.715 m (5' 7.5\")   Wt 84.8 kg (187 lb)   LMP 06/01/1988 (Approximate)   BMI 28.86 kg/m    Patient declined being weighed. Body mass index is 28.86 kg/m .    General  - normal appearance, in no obvious distress  CV  - normal pulses at posterior tib and dorsalis pedis  Pulm  - normal respiratory pattern, non-labored  Musculoskeletal - left foot  - stance: normal gait without limp, normal stance without excessive pronation, normal heel inversion with standing heel raise, no obvious leg length discrepancy, normal heel and toe walk- with pain in left foot  - inspection: no swelling or effusion, except over base of 4th toe, and painful palpation, normal bone and joint alignment, no obvious deformity  - palpation: no bony or soft tissue tenderness, except base of 4th toe  - ROM: normal active and passive ROM of great and lesser toes, no pain with MT translation  - strength: 5/5 in all planes  Neuro  - no sensory or motor deficit, grossly normal coordination, normal muscle tone  Skin  - no ecchymosis, erythema, warmth, or induration, no obvious rash  Psych  - interactive, appropriate, normal mood and affect  Right thumb: has pain to palpation over cmc joint thumb " right hand  ASSESSMENT & PLAN  68 yo female with left 4th toe contusion, and right cmc arthritis  Cont. Use of brace for hand  Buddy tape toes PRN  Ice PRN  Arnica gel recommended  F/u as needed    Samson Griffith MD, CAQSM    Again, thank you for allowing me to participate in the care of your patient.        Sincerely,        Samson Griffith MD

## 2019-08-23 ENCOUNTER — ANCILLARY PROCEDURE (OUTPATIENT)
Dept: MAMMOGRAPHY | Facility: CLINIC | Age: 69
End: 2019-08-23
Attending: FAMILY MEDICINE
Payer: MEDICARE

## 2019-08-23 ENCOUNTER — ANCILLARY PROCEDURE (OUTPATIENT)
Dept: BONE DENSITY | Facility: CLINIC | Age: 69
End: 2019-08-23
Attending: INTERNAL MEDICINE
Payer: MEDICARE

## 2019-08-23 DIAGNOSIS — M81.0 AGE-RELATED OSTEOPOROSIS WITHOUT CURRENT PATHOLOGICAL FRACTURE: ICD-10-CM

## 2019-08-23 DIAGNOSIS — M89.9 DISORDER OF BONE: ICD-10-CM

## 2019-08-23 DIAGNOSIS — Z12.31 VISIT FOR SCREENING MAMMOGRAM: ICD-10-CM

## 2019-08-23 PROCEDURE — 77063 BREAST TOMOSYNTHESIS BI: CPT | Performed by: RADIOLOGY

## 2019-08-23 PROCEDURE — 77080 DXA BONE DENSITY AXIAL: CPT | Performed by: RADIOLOGY

## 2019-08-23 PROCEDURE — 77067 SCR MAMMO BI INCL CAD: CPT | Performed by: RADIOLOGY

## 2019-08-23 PROCEDURE — 77081 DXA BONE DENSITY APPENDICULR: CPT | Mod: 59 | Performed by: RADIOLOGY

## 2019-08-26 ENCOUNTER — HOSPITAL ENCOUNTER (INPATIENT)
Facility: CLINIC | Age: 69
LOS: 4 days | Discharge: NURSING, REHAB OR LONG-TERM CARE | DRG: 872 | End: 2019-08-31
Attending: EMERGENCY MEDICINE | Admitting: INTERNAL MEDICINE
Payer: MEDICARE

## 2019-08-26 ENCOUNTER — APPOINTMENT (OUTPATIENT)
Dept: GENERAL RADIOLOGY | Facility: CLINIC | Age: 69
DRG: 872 | End: 2019-08-26
Attending: EMERGENCY MEDICINE
Payer: MEDICARE

## 2019-08-26 DIAGNOSIS — N18.30 TYPE 2 DIABETES MELLITUS WITH STAGE 3 CHRONIC KIDNEY DISEASE, WITHOUT LONG-TERM CURRENT USE OF INSULIN (H): ICD-10-CM

## 2019-08-26 DIAGNOSIS — B38.2 COCCIDIOIDAL PNEUMONITIS (H): ICD-10-CM

## 2019-08-26 DIAGNOSIS — I10 HYPERTENSION GOAL BP (BLOOD PRESSURE) < 140/80: ICD-10-CM

## 2019-08-26 DIAGNOSIS — Z78.9 STATIN INTOLERANCE: ICD-10-CM

## 2019-08-26 DIAGNOSIS — R42 DIZZINESS: ICD-10-CM

## 2019-08-26 DIAGNOSIS — L71.9 ROSACEA: ICD-10-CM

## 2019-08-26 DIAGNOSIS — H90.3 SENSORINEURAL HEARING LOSS (SNHL) OF BOTH EARS: ICD-10-CM

## 2019-08-26 DIAGNOSIS — E11.22 TYPE 2 DIABETES MELLITUS WITH STAGE 3 CHRONIC KIDNEY DISEASE, WITHOUT LONG-TERM CURRENT USE OF INSULIN (H): ICD-10-CM

## 2019-08-26 DIAGNOSIS — E11.9 TYPE 2 DIABETES, HBA1C GOAL < 7% (H): ICD-10-CM

## 2019-08-26 DIAGNOSIS — E78.1 HYPERTRIGLYCERIDEMIA: ICD-10-CM

## 2019-08-26 DIAGNOSIS — M54.9 DORSALGIA: ICD-10-CM

## 2019-08-26 DIAGNOSIS — I10 BENIGN ESSENTIAL HYPERTENSION: ICD-10-CM

## 2019-08-26 DIAGNOSIS — N17.9 ACUTE KIDNEY INJURY (H): ICD-10-CM

## 2019-08-26 DIAGNOSIS — E89.2 POSTABLATIVE HYPOPARATHYROIDISM (H): ICD-10-CM

## 2019-08-26 DIAGNOSIS — E78.5 HYPERLIPIDEMIA LDL GOAL <70: ICD-10-CM

## 2019-08-26 DIAGNOSIS — H72.92 TYMPANIC MEMBRANE PERFORATION, LEFT: ICD-10-CM

## 2019-08-26 DIAGNOSIS — R74.01 ELEVATED TRANSAMINASE LEVEL: ICD-10-CM

## 2019-08-26 DIAGNOSIS — M54.89 OTHER ACUTE BACK PAIN: ICD-10-CM

## 2019-08-26 DIAGNOSIS — N18.30 CKD (CHRONIC KIDNEY DISEASE) STAGE 3, GFR 30-59 ML/MIN (H): ICD-10-CM

## 2019-08-26 DIAGNOSIS — G43.009 MIGRAINE WITHOUT AURA AND WITHOUT STATUS MIGRAINOSUS, NOT INTRACTABLE: ICD-10-CM

## 2019-08-26 DIAGNOSIS — H60.392 OTHER INFECTIVE CHRONIC OTITIS EXTERNA OF LEFT EAR: ICD-10-CM

## 2019-08-26 DIAGNOSIS — Z94.4 LIVER REPLACED BY TRANSPLANT (H): ICD-10-CM

## 2019-08-26 DIAGNOSIS — I48.0 PAROXYSMAL ATRIAL FIBRILLATION (H): ICD-10-CM

## 2019-08-26 DIAGNOSIS — A41.51 ESCHERICHIA COLI SEPSIS (H): Primary | ICD-10-CM

## 2019-08-26 LAB
APTT PPP: 38 SEC (ref 22–37)
BASOPHILS # BLD AUTO: 0 10E9/L (ref 0–0.2)
BASOPHILS NFR BLD AUTO: 0.3 %
DIFFERENTIAL METHOD BLD: ABNORMAL
EOSINOPHIL # BLD AUTO: 0 10E9/L (ref 0–0.7)
EOSINOPHIL NFR BLD AUTO: 0.2 %
ERYTHROCYTE [DISTWIDTH] IN BLOOD BY AUTOMATED COUNT: 15.8 % (ref 10–15)
GLUCOSE BLDC GLUCOMTR-MCNC: 235 MG/DL (ref 70–99)
HCT VFR BLD AUTO: 33 % (ref 35–47)
HGB BLD-MCNC: 10.5 G/DL (ref 11.7–15.7)
IMM GRANULOCYTES # BLD: 0.2 10E9/L (ref 0–0.4)
IMM GRANULOCYTES NFR BLD: 1.4 %
INR PPP: 1.33 (ref 0.86–1.14)
LACTATE BLD-SCNC: 1.8 MMOL/L (ref 0.7–2)
LYMPHOCYTES # BLD AUTO: 0.8 10E9/L (ref 0.8–5.3)
LYMPHOCYTES NFR BLD AUTO: 6.2 %
MCH RBC QN AUTO: 27.3 PG (ref 26.5–33)
MCHC RBC AUTO-ENTMCNC: 31.8 G/DL (ref 31.5–36.5)
MCV RBC AUTO: 86 FL (ref 78–100)
MONOCYTES # BLD AUTO: 0.7 10E9/L (ref 0–1.3)
MONOCYTES NFR BLD AUTO: 5.4 %
NEUTROPHILS # BLD AUTO: 10.8 10E9/L (ref 1.6–8.3)
NEUTROPHILS NFR BLD AUTO: 86.5 %
NRBC # BLD AUTO: 0 10*3/UL
NRBC BLD AUTO-RTO: 0 /100
PLATELET # BLD AUTO: 226 10E9/L (ref 150–450)
RBC # BLD AUTO: 3.85 10E12/L (ref 3.8–5.2)
WBC # BLD AUTO: 12.5 10E9/L (ref 4–11)

## 2019-08-26 PROCEDURE — 96374 THER/PROPH/DIAG INJ IV PUSH: CPT | Performed by: EMERGENCY MEDICINE

## 2019-08-26 PROCEDURE — 87800 DETECT AGNT MULT DNA DIREC: CPT | Performed by: EMERGENCY MEDICINE

## 2019-08-26 PROCEDURE — 87077 CULTURE AEROBIC IDENTIFY: CPT | Performed by: EMERGENCY MEDICINE

## 2019-08-26 PROCEDURE — 99285 EMERGENCY DEPT VISIT HI MDM: CPT | Mod: Z6 | Performed by: EMERGENCY MEDICINE

## 2019-08-26 PROCEDURE — 85610 PROTHROMBIN TIME: CPT | Performed by: EMERGENCY MEDICINE

## 2019-08-26 PROCEDURE — 96375 TX/PRO/DX INJ NEW DRUG ADDON: CPT | Performed by: EMERGENCY MEDICINE

## 2019-08-26 PROCEDURE — 87186 SC STD MICRODIL/AGAR DIL: CPT | Performed by: EMERGENCY MEDICINE

## 2019-08-26 PROCEDURE — 00000146 ZZHCL STATISTIC GLUCOSE BY METER IP

## 2019-08-26 PROCEDURE — 86140 C-REACTIVE PROTEIN: CPT | Performed by: EMERGENCY MEDICINE

## 2019-08-26 PROCEDURE — 85025 COMPLETE CBC W/AUTO DIFF WBC: CPT | Performed by: EMERGENCY MEDICINE

## 2019-08-26 PROCEDURE — 85730 THROMBOPLASTIN TIME PARTIAL: CPT | Performed by: EMERGENCY MEDICINE

## 2019-08-26 PROCEDURE — 71046 X-RAY EXAM CHEST 2 VIEWS: CPT

## 2019-08-26 PROCEDURE — 87040 BLOOD CULTURE FOR BACTERIA: CPT | Performed by: EMERGENCY MEDICINE

## 2019-08-26 PROCEDURE — 96361 HYDRATE IV INFUSION ADD-ON: CPT

## 2019-08-26 PROCEDURE — 96361 HYDRATE IV INFUSION ADD-ON: CPT | Performed by: EMERGENCY MEDICINE

## 2019-08-26 PROCEDURE — 25000128 H RX IP 250 OP 636: Performed by: EMERGENCY MEDICINE

## 2019-08-26 PROCEDURE — 83690 ASSAY OF LIPASE: CPT | Performed by: EMERGENCY MEDICINE

## 2019-08-26 PROCEDURE — 83605 ASSAY OF LACTIC ACID: CPT | Performed by: EMERGENCY MEDICINE

## 2019-08-26 PROCEDURE — 85652 RBC SED RATE AUTOMATED: CPT | Performed by: EMERGENCY MEDICINE

## 2019-08-26 PROCEDURE — 80053 COMPREHEN METABOLIC PANEL: CPT | Performed by: EMERGENCY MEDICINE

## 2019-08-26 PROCEDURE — 99285 EMERGENCY DEPT VISIT HI MDM: CPT | Mod: 25 | Performed by: EMERGENCY MEDICINE

## 2019-08-26 RX ORDER — HYDROMORPHONE HYDROCHLORIDE 1 MG/ML
0.5 INJECTION, SOLUTION INTRAMUSCULAR; INTRAVENOUS; SUBCUTANEOUS
Status: COMPLETED | OUTPATIENT
Start: 2019-08-26 | End: 2019-08-27

## 2019-08-26 RX ORDER — ONDANSETRON 2 MG/ML
4 INJECTION INTRAMUSCULAR; INTRAVENOUS ONCE
Status: COMPLETED | OUTPATIENT
Start: 2019-08-26 | End: 2019-08-26

## 2019-08-26 RX ADMIN — HYDROMORPHONE HYDROCHLORIDE 0.5 MG: 1 INJECTION, SOLUTION INTRAMUSCULAR; INTRAVENOUS; SUBCUTANEOUS at 23:39

## 2019-08-26 RX ADMIN — ONDANSETRON 4 MG: 2 INJECTION INTRAMUSCULAR; INTRAVENOUS at 23:39

## 2019-08-26 RX ADMIN — SODIUM CHLORIDE 1000 ML: 9 INJECTION, SOLUTION INTRAVENOUS at 23:39

## 2019-08-26 ASSESSMENT — ENCOUNTER SYMPTOMS
WEAKNESS: 0
DIARRHEA: 0
ABDOMINAL PAIN: 0
VOMITING: 0
NUMBNESS: 0
FEVER: 0
BACK PAIN: 1
NAUSEA: 0
COUGH: 1

## 2019-08-26 ASSESSMENT — MIFFLIN-ST. JEOR: SCORE: 1346.43

## 2019-08-26 NOTE — LETTER
Health Information Management Services               Recipient:  [facility] [place] [place]        Sender:  HUMA Fowler, [facility]  ICU 4A & 4C   Ph: [phone]  Pager: [phone]        Date: August 31, 2019  Patient Name:  Luz Thompson  Routing Message:  Discharge orders attached          The documents accompanying this notice contain confidential information belonging to the sender.  This information is intended only for the use of the individual or entity named above.  The authorized recipient of this information is prohibited from disclosing this information to any other party and is required to destroy the information after its stated need has been fulfilled, unless otherwise required by state law.      If you are not the intended recipient, you are hereby notified that any disclosure, copy, distribution or action taken in reliance on the contents of these documents is strictly prohibited.  If you have received this document in error, please return it by fax to [phone] with a note on the cover sheet explaining why you are returning it (e.g. not your patient, not your provider, etc.).  If you need further assistance, please call [facility] Centralized Transcription at [phone].  Documents may also be returned by mail to [employer], , [facility] [address], [place], [place] [address].

## 2019-08-27 ENCOUNTER — APPOINTMENT (OUTPATIENT)
Dept: ULTRASOUND IMAGING | Facility: CLINIC | Age: 69
DRG: 872 | End: 2019-08-27
Attending: INTERNAL MEDICINE
Payer: MEDICARE

## 2019-08-27 ENCOUNTER — APPOINTMENT (OUTPATIENT)
Dept: CT IMAGING | Facility: CLINIC | Age: 69
DRG: 872 | End: 2019-08-27
Attending: EMERGENCY MEDICINE
Payer: MEDICARE

## 2019-08-27 PROBLEM — N39.0 UTI (URINARY TRACT INFECTION): Status: ACTIVE | Noted: 2019-08-27

## 2019-08-27 PROBLEM — A41.50 GRAM NEGATIVE SEPSIS (H): Status: ACTIVE | Noted: 2019-08-27

## 2019-08-27 LAB
ALBUMIN SERPL-MCNC: 1.8 G/DL (ref 3.4–5)
ALBUMIN SERPL-MCNC: 2.4 G/DL (ref 3.4–5)
ALBUMIN UR-MCNC: 300 MG/DL
ALP SERPL-CCNC: 198 U/L (ref 40–150)
ALP SERPL-CCNC: 259 U/L (ref 40–150)
ALT SERPL W P-5'-P-CCNC: 120 U/L (ref 0–50)
ALT SERPL W P-5'-P-CCNC: 165 U/L (ref 0–50)
AMORPH CRY #/AREA URNS HPF: ABNORMAL /HPF
ANION GAP SERPL CALCULATED.3IONS-SCNC: 10 MMOL/L (ref 3–14)
ANION GAP SERPL CALCULATED.3IONS-SCNC: 9 MMOL/L (ref 3–14)
APPEARANCE UR: ABNORMAL
AST SERPL W P-5'-P-CCNC: 133 U/L (ref 0–45)
AST SERPL W P-5'-P-CCNC: 87 U/L (ref 0–45)
BACTERIA #/AREA URNS HPF: ABNORMAL /HPF
BILIRUB SERPL-MCNC: 0.3 MG/DL (ref 0.2–1.3)
BILIRUB SERPL-MCNC: 0.5 MG/DL (ref 0.2–1.3)
BILIRUB UR QL STRIP: NEGATIVE
BUN SERPL-MCNC: 40 MG/DL (ref 7–30)
BUN SERPL-MCNC: 41 MG/DL (ref 7–30)
CALCIUM SERPL-MCNC: 7 MG/DL (ref 8.5–10.1)
CALCIUM SERPL-MCNC: 8.1 MG/DL (ref 8.5–10.1)
CHLORIDE SERPL-SCNC: 100 MMOL/L (ref 94–109)
CHLORIDE SERPL-SCNC: 109 MMOL/L (ref 94–109)
CO2 SERPL-SCNC: 22 MMOL/L (ref 20–32)
CO2 SERPL-SCNC: 26 MMOL/L (ref 20–32)
COLOR UR AUTO: YELLOW
CREAT SERPL-MCNC: 2.15 MG/DL (ref 0.52–1.04)
CREAT SERPL-MCNC: 2.38 MG/DL (ref 0.52–1.04)
CRP SERPL-MCNC: 180 MG/L (ref 0–8)
ERYTHROCYTE [DISTWIDTH] IN BLOOD BY AUTOMATED COUNT: 15.9 % (ref 10–15)
ERYTHROCYTE [SEDIMENTATION RATE] IN BLOOD BY WESTERGREN METHOD: 78 MM/H (ref 0–30)
GFR SERPL CREATININE-BSD FRML MDRD: 20 ML/MIN/{1.73_M2}
GFR SERPL CREATININE-BSD FRML MDRD: 23 ML/MIN/{1.73_M2}
GLUCOSE BLDC GLUCOMTR-MCNC: 170 MG/DL (ref 70–99)
GLUCOSE BLDC GLUCOMTR-MCNC: 199 MG/DL (ref 70–99)
GLUCOSE BLDC GLUCOMTR-MCNC: 242 MG/DL (ref 70–99)
GLUCOSE SERPL-MCNC: 134 MG/DL (ref 70–99)
GLUCOSE SERPL-MCNC: 209 MG/DL (ref 70–99)
GLUCOSE UR STRIP-MCNC: NEGATIVE MG/DL
HCT VFR BLD AUTO: 28.9 % (ref 35–47)
HGB BLD-MCNC: 8.4 G/DL (ref 11.7–15.7)
HGB UR QL STRIP: ABNORMAL
HYALINE CASTS #/AREA URNS LPF: 1 /LPF (ref 0–2)
KETONES UR STRIP-MCNC: NEGATIVE MG/DL
LACTATE BLD-SCNC: 0.5 MMOL/L (ref 0.7–2)
LEUKOCYTE ESTERASE UR QL STRIP: ABNORMAL
LIPASE SERPL-CCNC: 66 U/L (ref 73–393)
MCH RBC QN AUTO: 26.4 PG (ref 26.5–33)
MCHC RBC AUTO-ENTMCNC: 29.1 G/DL (ref 31.5–36.5)
MCV RBC AUTO: 91 FL (ref 78–100)
MUCOUS THREADS #/AREA URNS LPF: PRESENT /LPF
NITRATE UR QL: NEGATIVE
PH UR STRIP: 6.5 PH (ref 5–7)
PLATELET # BLD AUTO: 191 10E9/L (ref 150–450)
POTASSIUM SERPL-SCNC: 3.6 MMOL/L (ref 3.4–5.3)
POTASSIUM SERPL-SCNC: 3.6 MMOL/L (ref 3.4–5.3)
PROCALCITONIN SERPL-MCNC: 3.15 NG/ML
PROT SERPL-MCNC: 5.4 G/DL (ref 6.8–8.8)
PROT SERPL-MCNC: 7 G/DL (ref 6.8–8.8)
RBC # BLD AUTO: 3.18 10E12/L (ref 3.8–5.2)
RBC #/AREA URNS AUTO: 12 /HPF (ref 0–2)
SODIUM SERPL-SCNC: 135 MMOL/L (ref 133–144)
SODIUM SERPL-SCNC: 142 MMOL/L (ref 133–144)
SOURCE: ABNORMAL
SP GR UR STRIP: 1.01 (ref 1–1.03)
SQUAMOUS #/AREA URNS AUTO: 9 /HPF (ref 0–1)
TRANS CELLS #/AREA URNS HPF: <1 /HPF (ref 0–1)
UROBILINOGEN UR STRIP-MCNC: NORMAL MG/DL (ref 0–2)
WBC # BLD AUTO: 9.2 10E9/L (ref 4–11)
WBC #/AREA URNS AUTO: 12 /HPF (ref 0–5)

## 2019-08-27 PROCEDURE — 80053 COMPREHEN METABOLIC PANEL: CPT | Performed by: INTERNAL MEDICINE

## 2019-08-27 PROCEDURE — 25800030 ZZH RX IP 258 OP 636: Performed by: STUDENT IN AN ORGANIZED HEALTH CARE EDUCATION/TRAINING PROGRAM

## 2019-08-27 PROCEDURE — 96365 THER/PROPH/DIAG IV INF INIT: CPT | Performed by: EMERGENCY MEDICINE

## 2019-08-27 PROCEDURE — 93975 VASCULAR STUDY: CPT | Mod: TC

## 2019-08-27 PROCEDURE — 84145 PROCALCITONIN (PCT): CPT | Performed by: EMERGENCY MEDICINE

## 2019-08-27 PROCEDURE — 87040 BLOOD CULTURE FOR BACTERIA: CPT | Performed by: EMERGENCY MEDICINE

## 2019-08-27 PROCEDURE — 87088 URINE BACTERIA CULTURE: CPT | Performed by: EMERGENCY MEDICINE

## 2019-08-27 PROCEDURE — 72131 CT LUMBAR SPINE W/O DYE: CPT

## 2019-08-27 PROCEDURE — 83605 ASSAY OF LACTIC ACID: CPT | Performed by: EMERGENCY MEDICINE

## 2019-08-27 PROCEDURE — 25000132 ZZH RX MED GY IP 250 OP 250 PS 637: Mod: GY | Performed by: STUDENT IN AN ORGANIZED HEALTH CARE EDUCATION/TRAINING PROGRAM

## 2019-08-27 PROCEDURE — 25000132 ZZH RX MED GY IP 250 OP 250 PS 637: Mod: GY | Performed by: INTERNAL MEDICINE

## 2019-08-27 PROCEDURE — 87077 CULTURE AEROBIC IDENTIFY: CPT | Performed by: EMERGENCY MEDICINE

## 2019-08-27 PROCEDURE — 85027 COMPLETE CBC AUTOMATED: CPT | Performed by: INTERNAL MEDICINE

## 2019-08-27 PROCEDURE — 12000001 ZZH R&B MED SURG/OB UMMC

## 2019-08-27 PROCEDURE — 87186 SC STD MICRODIL/AGAR DIL: CPT | Performed by: EMERGENCY MEDICINE

## 2019-08-27 PROCEDURE — 96361 HYDRATE IV INFUSION ADD-ON: CPT

## 2019-08-27 PROCEDURE — 99223 1ST HOSP IP/OBS HIGH 75: CPT | Mod: AI | Performed by: INTERNAL MEDICINE

## 2019-08-27 PROCEDURE — 25000131 ZZH RX MED GY IP 250 OP 636 PS 637: Mod: GY | Performed by: STUDENT IN AN ORGANIZED HEALTH CARE EDUCATION/TRAINING PROGRAM

## 2019-08-27 PROCEDURE — 81001 URINALYSIS AUTO W/SCOPE: CPT | Performed by: EMERGENCY MEDICINE

## 2019-08-27 PROCEDURE — 74176 CT ABD & PELVIS W/O CONTRAST: CPT

## 2019-08-27 PROCEDURE — 36415 COLL VENOUS BLD VENIPUNCTURE: CPT | Performed by: INTERNAL MEDICINE

## 2019-08-27 PROCEDURE — 72128 CT CHEST SPINE W/O DYE: CPT

## 2019-08-27 PROCEDURE — 25000128 H RX IP 250 OP 636: Performed by: EMERGENCY MEDICINE

## 2019-08-27 PROCEDURE — 87086 URINE CULTURE/COLONY COUNT: CPT | Performed by: EMERGENCY MEDICINE

## 2019-08-27 PROCEDURE — 96366 THER/PROPH/DIAG IV INF ADDON: CPT | Performed by: EMERGENCY MEDICINE

## 2019-08-27 PROCEDURE — 25800030 ZZH RX IP 258 OP 636: Performed by: EMERGENCY MEDICINE

## 2019-08-27 PROCEDURE — 00000146 ZZHCL STATISTIC GLUCOSE BY METER IP

## 2019-08-27 RX ORDER — PREDNISONE 10 MG/1
10 TABLET ORAL DAILY
Status: DISCONTINUED | OUTPATIENT
Start: 2019-08-27 | End: 2019-08-27

## 2019-08-27 RX ORDER — LEVOTHYROXINE SODIUM 150 UG/1
150 TABLET ORAL EVERY OTHER DAY
Status: ON HOLD | COMMUNITY
End: 2019-08-31

## 2019-08-27 RX ORDER — PREDNISONE 5 MG/1
5 TABLET ORAL DAILY
Status: ON HOLD | COMMUNITY
End: 2019-08-31

## 2019-08-27 RX ORDER — LIDOCAINE 40 MG/G
CREAM TOPICAL
Status: DISCONTINUED | OUTPATIENT
Start: 2019-08-27 | End: 2019-08-30

## 2019-08-27 RX ORDER — EZETIMIBE 10 MG/1
10 TABLET ORAL DAILY
Status: DISCONTINUED | OUTPATIENT
Start: 2019-08-27 | End: 2019-08-31 | Stop reason: HOSPADM

## 2019-08-27 RX ORDER — ESTRADIOL 0.1 MG/G
2 CREAM VAGINAL
Status: ON HOLD | COMMUNITY
End: 2019-08-31

## 2019-08-27 RX ORDER — POSACONAZOLE 100 MG/1
300 TABLET, DELAYED RELEASE ORAL EVERY MORNING
Status: DISCONTINUED | OUTPATIENT
Start: 2019-08-27 | End: 2019-08-27

## 2019-08-27 RX ORDER — LEVOTHYROXINE SODIUM 150 UG/1
150 TABLET ORAL EVERY OTHER DAY
Status: DISCONTINUED | OUTPATIENT
Start: 2019-08-28 | End: 2019-08-31 | Stop reason: HOSPADM

## 2019-08-27 RX ORDER — SIROLIMUS 1 MG
1 TABLET ORAL EVERY OTHER DAY
Status: DISCONTINUED | OUTPATIENT
Start: 2019-08-27 | End: 2019-08-31 | Stop reason: HOSPADM

## 2019-08-27 RX ORDER — NALOXONE HYDROCHLORIDE 0.4 MG/ML
.1-.4 INJECTION, SOLUTION INTRAMUSCULAR; INTRAVENOUS; SUBCUTANEOUS
Status: DISCONTINUED | OUTPATIENT
Start: 2019-08-27 | End: 2019-08-31 | Stop reason: HOSPADM

## 2019-08-27 RX ORDER — VORICONAZOLE 200 MG/1
200 TABLET, FILM COATED ORAL 2 TIMES DAILY
Status: DISCONTINUED | OUTPATIENT
Start: 2019-08-27 | End: 2019-08-31 | Stop reason: HOSPADM

## 2019-08-27 RX ORDER — FERROUS GLUCONATE 324(38)MG
324 TABLET ORAL
Status: DISCONTINUED | OUTPATIENT
Start: 2019-08-27 | End: 2019-08-31 | Stop reason: HOSPADM

## 2019-08-27 RX ORDER — URSODIOL 250 MG/1
250 TABLET, FILM COATED ORAL DAILY
Status: DISCONTINUED | OUTPATIENT
Start: 2019-08-27 | End: 2019-08-27

## 2019-08-27 RX ORDER — CALCITRIOL 0.25 UG/1
0.5 CAPSULE, LIQUID FILLED ORAL DAILY
Status: DISCONTINUED | OUTPATIENT
Start: 2019-08-27 | End: 2019-08-31 | Stop reason: HOSPADM

## 2019-08-27 RX ORDER — SERTRALINE HYDROCHLORIDE 100 MG/1
100 TABLET, FILM COATED ORAL DAILY
Status: DISCONTINUED | OUTPATIENT
Start: 2019-08-27 | End: 2019-08-28

## 2019-08-27 RX ORDER — METOPROLOL SUCCINATE 25 MG/1
50 TABLET, EXTENDED RELEASE ORAL DAILY
Status: DISCONTINUED | OUTPATIENT
Start: 2019-08-27 | End: 2019-08-28

## 2019-08-27 RX ORDER — URSODIOL 250 MG/1
250 TABLET, FILM COATED ORAL 2 TIMES DAILY
Status: DISCONTINUED | OUTPATIENT
Start: 2019-08-27 | End: 2019-08-31 | Stop reason: HOSPADM

## 2019-08-27 RX ORDER — LOSARTAN POTASSIUM 25 MG/1
25 TABLET ORAL DAILY
Status: DISCONTINUED | OUTPATIENT
Start: 2019-08-27 | End: 2019-08-27

## 2019-08-27 RX ORDER — SERTRALINE HYDROCHLORIDE 100 MG/1
TABLET, FILM COATED ORAL DAILY
Status: ON HOLD | COMMUNITY
End: 2019-08-31

## 2019-08-27 RX ORDER — SODIUM CHLORIDE 9 MG/ML
INJECTION, SOLUTION INTRAVENOUS CONTINUOUS
Status: ACTIVE | OUTPATIENT
Start: 2019-08-27 | End: 2019-08-27

## 2019-08-27 RX ORDER — FOLIC ACID 1 MG/1
1 TABLET ORAL DAILY
Status: DISCONTINUED | OUTPATIENT
Start: 2019-08-27 | End: 2019-08-31 | Stop reason: HOSPADM

## 2019-08-27 RX ORDER — PREDNISONE 5 MG/1
5 TABLET ORAL DAILY
Status: DISCONTINUED | OUTPATIENT
Start: 2019-08-28 | End: 2019-08-28

## 2019-08-27 RX ORDER — LEVOTHYROXINE SODIUM 175 UG/1
175 TABLET ORAL EVERY OTHER DAY
Status: ON HOLD | COMMUNITY
End: 2019-08-31

## 2019-08-27 RX ADMIN — VORICONAZOLE 200 MG: 200 TABLET, FILM COATED ORAL at 20:46

## 2019-08-27 RX ADMIN — FERROUS GLUCONATE 324 MG: 324 TABLET ORAL at 09:41

## 2019-08-27 RX ADMIN — APIXABAN 2.5 MG: 2.5 TABLET, FILM COATED ORAL at 20:44

## 2019-08-27 RX ADMIN — SIROLIMUS 1 MG: 1 TABLET, SUGAR COATED ORAL at 09:42

## 2019-08-27 RX ADMIN — CALCITRIOL CAPSULES 0.25 MCG 0.5 MCG: 0.25 CAPSULE ORAL at 09:43

## 2019-08-27 RX ADMIN — PREDNISONE 10 MG: 5 TABLET ORAL at 09:41

## 2019-08-27 RX ADMIN — SODIUM CHLORIDE 1000 ML: 9 INJECTION, SOLUTION INTRAVENOUS at 02:30

## 2019-08-27 RX ADMIN — HYDROMORPHONE HYDROCHLORIDE 0.5 MG: 1 INJECTION, SOLUTION INTRAMUSCULAR; INTRAVENOUS; SUBCUTANEOUS at 08:13

## 2019-08-27 RX ADMIN — EZETIMIBE 10 MG: 10 TABLET ORAL at 20:44

## 2019-08-27 RX ADMIN — FOLIC ACID 1 MG: 1 TABLET ORAL at 10:58

## 2019-08-27 RX ADMIN — APIXABAN 2.5 MG: 2.5 TABLET, FILM COATED ORAL at 09:41

## 2019-08-27 RX ADMIN — SERTRALINE HYDROCHLORIDE 100 MG: 100 TABLET ORAL at 21:30

## 2019-08-27 RX ADMIN — FERROUS GLUCONATE 324 MG: 324 TABLET ORAL at 18:37

## 2019-08-27 RX ADMIN — URSODIOL 250 MG: 250 TABLET, FILM COATED ORAL at 20:44

## 2019-08-27 RX ADMIN — VORICONAZOLE 200 MG: 200 TABLET, FILM COATED ORAL at 09:47

## 2019-08-27 RX ADMIN — LEVOTHYROXINE SODIUM 175 MCG: 175 TABLET ORAL at 09:39

## 2019-08-27 RX ADMIN — ERTAPENEM SODIUM 500 MG: 1 INJECTION, POWDER, LYOPHILIZED, FOR SOLUTION INTRAMUSCULAR; INTRAVENOUS at 02:30

## 2019-08-27 RX ADMIN — HYDROMORPHONE HYDROCHLORIDE 0.5 MG: 1 INJECTION, SOLUTION INTRAMUSCULAR; INTRAVENOUS; SUBCUTANEOUS at 02:07

## 2019-08-27 RX ADMIN — SODIUM CHLORIDE: 9 INJECTION, SOLUTION INTRAVENOUS at 05:11

## 2019-08-27 ASSESSMENT — MIFFLIN-ST. JEOR: SCORE: 1378.18

## 2019-08-27 ASSESSMENT — ACTIVITIES OF DAILY LIVING (ADL)
DRESS: 0-->INDEPENDENT
BATHING: 0-->INDEPENDENT
TOILETING: 0-->INDEPENDENT
WHICH_OF_THE_ABOVE_FUNCTIONAL_RISKS_HAD_A_RECENT_ONSET_OR_CHANGE?: AMBULATION;FALL HISTORY
RETIRED_EATING: 0-->INDEPENDENT
ADLS_ACUITY_SCORE: 13
ADLS_ACUITY_SCORE: 13
SWALLOWING: 0-->SWALLOWS FOODS/LIQUIDS WITHOUT DIFFICULTY
RETIRED_COMMUNICATION: 0-->UNDERSTANDS/COMMUNICATES WITHOUT DIFFICULTY
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
## 2019-08-27 NOTE — ED NOTES
Gold team paged to ask about administration parameters for metoprolol and losartan(not ordered but reports that she takes this in the AM daily).
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
## 2019-08-27 NOTE — SUMMARY OF CARE
Pt transferred to  and brought a cell phone, mati, charging cord, glasses, hearing aid, hearing aid , upper denture, shirt, wedding ring, earrings, and slippers.

## 2019-08-27 NOTE — PHARMACY-ADMISSION MEDICATION HISTORY
Admission medication history interview status for the 8/26/2019 admission is complete. See Epic admission navigator for allergy information, pharmacy, prior to admission medications and immunization status.     Medication history interview sources:  Patient, pharmacy dispense report, patient's pharmacy ([phone]), and Care Everywhere.    Changes made to PTA medication list (reason)  Added: Levothyroxine 150 mcg  Deleted:   - Ampicillin, azithromycin, cefazolin, ceftazidime, doxycycline, fluconazole, and penicillin: patient previously signed up for allergy testing, however, she reports decided to discontinue the testing due to her current health conditions.  - Benzoyl peroxide liquid, clotrimazole, tobramycin eye drops, triamcinolone 0.025% and 0.1% cream: per patient, therapy completed.  -  Estradiol vaginal tablets: due to insurance coverage has switched to Estrace vaginal cream instead.  - Methenamine: per patient, therapy completed for fecal incontinence.   - Metronidazole 0.75% cream: patient was started metronidazole 0.75 % gel instead.  - Prednisone 20 mg: therapy completed, patient currently on prednisone 5 mg once daily therapy.  Changed:   - Levothyroxine dose and direction changed from take one 175 mcg tablet once daily to alternate 175 mcg tablet with 150 mcg tablet once daily every other day.    Additional medication history information (including reliability of information, actions taken by pharmacist): Patient is a good historian overall.   - Patient states she previously agreed to start an allergy testing, however, she decided to discontinue the testing due to her health conditions and life events that happened recently.  - Per patient, during last clinic follow-up MD instructed her to alternate levothyroxine 175 mcg tablet with levothyroxine 150 mcg tablet once daily every other day.   - Patient reports taking prednisone 10 mg once daily, however, based on chart review and patient's pharmacy's  record ([place], [place] [phone]) patient has been filling prednisone 5 mg once daily instead.  - Patient recently started therapy for sertraline on 08/22/19 and was instructed to take 100 mg half tablet (50 mg) once daily for 2 weeks, then titrate up to one 100 mg tablet once daily.      Prior to Admission medications    Medication Sig Last Dose Taking? Auth Provider   acetaminophen 500 MG CAPS Take 1,000 mg by mouth nightly as needed Take 500-1,000 mg by mouth every 6 hours if needed.  as needed Yes Reported, Patient   alirocumab (PRALUENT) 150 MG/ML injectable pen Inject 1 mL (150 mg) Subcutaneous every 14 days 8/21/2019 Yes Randell Reyna MD   blood glucose (ACCU-CHEK GUIDE) test strip Use to test blood sugar 2 times daily or as directed.  Yes Rach Vasquez MD   blood glucose monitoring (ACCU-CHEK FASTCLIX) lancets Use to test blood sugar 2 times daily or as directed.  Yes Rach Vasquez MD   blood glucose monitoring (NO BRAND SPECIFIED) meter device kit Use to test blood sugar 2times daily or as directed.  Yes Rach Vasquez MD   blood glucose monitoring (NO BRAND SPECIFIED) test strip Use to test blood sugar 2 times daily, before breakfast and before bedtime  Yes Rach Vasquez MD   calcitRIOL (ROCALTROL) 0.5 MCG capsule Take 1 capsule (0.5 mcg) by mouth daily 8/27/2019 at am Yes Rach Vasquez MD   ciprofloxacin-dexamethasone (CIPRODEX) otic suspension Place 4 drops Into the left ear three times a week unknown Yes Randell Mejia MD   ELIQUIS 2.5 MG tablet Take 1 tablet (2.5 mg) by mouth 2 times daily 8/27/2019 at am Yes Graham Gilmore MD   estradiol (ESTRACE) 0.1 MG/GM vaginal cream Place 2 g vaginally twice a week 8/22/2019 Yes Unknown, Entered By History   ezetimibe (ZETIA) 10 MG tablet Take 1 tablet (10 mg) by mouth daily 8/27/2019 at am Yes Britt Oswald NP   ferrous gluconate (FERGON) 324 (38 Fe) MG tablet Take 1  tablet (324 mg) by mouth 3 times daily (with meals) 8/27/2019 at am Yes Gentry Ramirez MD   folic acid (FOLVITE) 1 MG tablet Take 1 tablet (1 mg) by mouth daily 8/27/2019 at am Yes Gentry Ramirez MD   furosemide (LASIX) 20 MG tablet Take 1 tablet (20 mg) by mouth daily 8/27/2019 at am Yes Nicoltete Quan MD   hydrALAZINE (APRESOLINE) 25 MG tablet Take 0.5 tablets (12.5 mg) by mouth 3 times daily as needed , if systolic blood pressure is more than 140 mmHg. as needed Yes Britt Oswald NP   Hypromellose (ARTIFICIAL TEARS OP) Apply 1 drop to eye as needed as needed Yes Reported, Patient   levothyroxine (SYNTHROID/LEVOTHROID) 150 MCG tablet Take 150 mcg by mouth every other day Alternate with levothyroxine 175 mcg. 8/26/2019 at am Yes Unknown, Entered By History   levothyroxine (SYNTHROID/LEVOTHROID) 175 MCG tablet Take 175 mcg by mouth every other day Alternate with levothyroxine 150 mcg. 8/27/2019 at am Yes Unknown, Entered By History   losartan (COZAAR) 25 MG tablet Take 1 tablet (25 mg) by mouth daily 8/27/2019 at am Yes Britt Oswald NP   meclizine (ANTIVERT) 25 MG tablet Take 1 tablet (25 mg) by mouth as needed as needed Yes Jenniefr Barraza MD   metoprolol succinate (TOPROL-XL) 50 MG 24 hr tablet Take 1 tablet (50 mg) by mouth daily 8/27/2019 at am Yes Graham Gilmore MD   metroNIDAZOLE (METROGEL) 0.75 % external gel Apply topically 2 times daily Put on face 8/27/2019 at am Yes Will Rai MD   Multiple Vitamins-Minerals (PRESERVISION AREDS 2) CAPS Take 1 capsule by mouth 2 times daily 8/27/2019 at am Yes Unknown, Entered By History   omega-3 acid ethyl esters (LOVAZA) 1 g capsule Take 1 capsule (1 g) by mouth 2 times daily 8/27/2019 at am Yes Britt Oswald NP   order for DME Equipment being ordered: right brace with thumb  Yes Suni Cai PA-C   predniSONE (DELTASONE) 5 MG tablet Take 5 mg by mouth daily 8/27/2019 at am Yes Unknown, Entered By  History   RAPAMUNE (BRAND) 1 MG tablet Take 1 tablet (1 mg) by mouth every other day 8/27/2019 at am Yes Gentry Ramirez MD   sertraline (ZOLOFT) 100 MG tablet Take by mouth daily Take one half tablet by mouth daily for 2 weeks, then increase to one tablet once daily. 8/27/2019 at am Yes Unknown, Entered By History   SUMAtriptan (IMITREX) 50 MG tablet Take 1 tablet (50 mg) by mouth at onset of headache for migraine repeat after 2 hours if needed. as needed Yes Jennifer Barraza MD   ursodiol (ACTIGALL) 250 MG tablet TAKE 1 TABLET BY MOUTH TWICE A DAY 8/27/2019 at am Yes Gentry Ramirez MD   voriconazole (VFEND) 200 MG tablet Take 1 tablet (200 mg) by mouth 2 times daily 8/27/2019 at am Yes Crystal Montero MD   Wheat Dextrin (BENEFIBER) POWD 2 teaspoons daily 8/27/2019 at am Yes Crystal Montero MD   clotrimazole (LOTRIMIN) 1 % cream Apply topically 2 times daily as needed Reported on 4/25/2017   Crystal Montero MD   nitroFURantoin, macrocrystal-monohydrate, (MACROBID) 100 MG capsule Take 1 capsule (100 mg) by mouth 2 times daily For one week at onset of UTI symptoms has not started  Ricardo Jason MD         Medication history completed by:   Charla Manrique   PharmD IV Student
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
## 2019-08-28 ENCOUNTER — APPOINTMENT (OUTPATIENT)
Dept: PHYSICAL THERAPY | Facility: CLINIC | Age: 69
DRG: 872 | End: 2019-08-28
Attending: INTERNAL MEDICINE
Payer: MEDICARE

## 2019-08-28 PROBLEM — A41.51: Status: ACTIVE | Noted: 2019-08-28

## 2019-08-28 LAB
ALBUMIN SERPL-MCNC: 2 G/DL (ref 3.4–5)
ALP SERPL-CCNC: 207 U/L (ref 40–150)
ALT SERPL W P-5'-P-CCNC: 112 U/L (ref 0–50)
ANION GAP SERPL CALCULATED.3IONS-SCNC: 9 MMOL/L (ref 3–14)
AST SERPL W P-5'-P-CCNC: 69 U/L (ref 0–45)
BACTERIA SPEC CULT: ABNORMAL
BILIRUB SERPL-MCNC: 0.2 MG/DL (ref 0.2–1.3)
BUN SERPL-MCNC: 43 MG/DL (ref 7–30)
CALCIUM SERPL-MCNC: 7.7 MG/DL (ref 8.5–10.1)
CHLORIDE SERPL-SCNC: 108 MMOL/L (ref 94–109)
CO2 SERPL-SCNC: 22 MMOL/L (ref 20–32)
CREAT SERPL-MCNC: 1.85 MG/DL (ref 0.52–1.04)
ERYTHROCYTE [DISTWIDTH] IN BLOOD BY AUTOMATED COUNT: 15.9 % (ref 10–15)
GFR SERPL CREATININE-BSD FRML MDRD: 27 ML/MIN/{1.73_M2}
GLUCOSE BLDC GLUCOMTR-MCNC: 150 MG/DL (ref 70–99)
GLUCOSE BLDC GLUCOMTR-MCNC: 162 MG/DL (ref 70–99)
GLUCOSE BLDC GLUCOMTR-MCNC: 183 MG/DL (ref 70–99)
GLUCOSE BLDC GLUCOMTR-MCNC: 214 MG/DL (ref 70–99)
GLUCOSE SERPL-MCNC: 165 MG/DL (ref 70–99)
HCT VFR BLD AUTO: 30.2 % (ref 35–47)
HGB BLD-MCNC: 9.1 G/DL (ref 11.7–15.7)
Lab: ABNORMAL
MCH RBC QN AUTO: 26.8 PG (ref 26.5–33)
MCHC RBC AUTO-ENTMCNC: 30.1 G/DL (ref 31.5–36.5)
MCV RBC AUTO: 89 FL (ref 78–100)
PLATELET # BLD AUTO: 225 10E9/L (ref 150–450)
POTASSIUM SERPL-SCNC: 4.3 MMOL/L (ref 3.4–5.3)
PROT SERPL-MCNC: 6.2 G/DL (ref 6.8–8.8)
RBC # BLD AUTO: 3.39 10E12/L (ref 3.8–5.2)
SODIUM SERPL-SCNC: 139 MMOL/L (ref 133–144)
SPECIMEN SOURCE: ABNORMAL
WBC # BLD AUTO: 7.2 10E9/L (ref 4–11)

## 2019-08-28 PROCEDURE — 97116 GAIT TRAINING THERAPY: CPT | Mod: GP

## 2019-08-28 PROCEDURE — 85027 COMPLETE CBC AUTOMATED: CPT | Performed by: INTERNAL MEDICINE

## 2019-08-28 PROCEDURE — 25000132 ZZH RX MED GY IP 250 OP 250 PS 637: Mod: GY | Performed by: NURSE PRACTITIONER

## 2019-08-28 PROCEDURE — 97530 THERAPEUTIC ACTIVITIES: CPT | Mod: GP

## 2019-08-28 PROCEDURE — 00000146 ZZHCL STATISTIC GLUCOSE BY METER IP

## 2019-08-28 PROCEDURE — 87040 BLOOD CULTURE FOR BACTERIA: CPT | Performed by: INTERNAL MEDICINE

## 2019-08-28 PROCEDURE — 99233 SBSQ HOSP IP/OBS HIGH 50: CPT | Performed by: INTERNAL MEDICINE

## 2019-08-28 PROCEDURE — 25000132 ZZH RX MED GY IP 250 OP 250 PS 637: Mod: GY | Performed by: INTERNAL MEDICINE

## 2019-08-28 PROCEDURE — 25000132 ZZH RX MED GY IP 250 OP 250 PS 637: Mod: GY | Performed by: STUDENT IN AN ORGANIZED HEALTH CARE EDUCATION/TRAINING PROGRAM

## 2019-08-28 PROCEDURE — 25000131 ZZH RX MED GY IP 250 OP 636 PS 637: Mod: GY | Performed by: INTERNAL MEDICINE

## 2019-08-28 PROCEDURE — 80053 COMPREHEN METABOLIC PANEL: CPT | Performed by: INTERNAL MEDICINE

## 2019-08-28 PROCEDURE — 97161 PT EVAL LOW COMPLEX 20 MIN: CPT | Mod: GP

## 2019-08-28 PROCEDURE — 87799 DETECT AGENT NOS DNA QUANT: CPT | Performed by: INTERNAL MEDICINE

## 2019-08-28 PROCEDURE — 25800030 ZZH RX IP 258 OP 636: Performed by: STUDENT IN AN ORGANIZED HEALTH CARE EDUCATION/TRAINING PROGRAM

## 2019-08-28 PROCEDURE — 12000001 ZZH R&B MED SURG/OB UMMC

## 2019-08-28 PROCEDURE — 36415 COLL VENOUS BLD VENIPUNCTURE: CPT | Performed by: INTERNAL MEDICINE

## 2019-08-28 PROCEDURE — 25000128 H RX IP 250 OP 636: Performed by: STUDENT IN AN ORGANIZED HEALTH CARE EDUCATION/TRAINING PROGRAM

## 2019-08-28 RX ORDER — HYDRALAZINE HYDROCHLORIDE 25 MG/1
25 TABLET, FILM COATED ORAL DAILY PRN
Status: DISCONTINUED | OUTPATIENT
Start: 2019-08-28 | End: 2019-08-29

## 2019-08-28 RX ORDER — HYDROMORPHONE HYDROCHLORIDE 1 MG/ML
.3-.5 INJECTION, SOLUTION INTRAMUSCULAR; INTRAVENOUS; SUBCUTANEOUS EVERY 4 HOURS PRN
Status: DISCONTINUED | OUTPATIENT
Start: 2019-08-28 | End: 2019-08-31 | Stop reason: HOSPADM

## 2019-08-28 RX ORDER — OXYCODONE HYDROCHLORIDE 5 MG/1
5 TABLET ORAL EVERY 4 HOURS PRN
Status: DISCONTINUED | OUTPATIENT
Start: 2019-08-28 | End: 2019-08-31 | Stop reason: HOSPADM

## 2019-08-28 RX ORDER — PREDNISONE 10 MG/1
10 TABLET ORAL DAILY
Status: DISCONTINUED | OUTPATIENT
Start: 2019-08-29 | End: 2019-08-31 | Stop reason: HOSPADM

## 2019-08-28 RX ORDER — ESTRADIOL 0.1 MG/G
2 CREAM VAGINAL
Status: DISCONTINUED | OUTPATIENT
Start: 2019-08-29 | End: 2019-08-31 | Stop reason: HOSPADM

## 2019-08-28 RX ORDER — LOSARTAN POTASSIUM 25 MG/1
25 TABLET ORAL DAILY
Status: DISCONTINUED | OUTPATIENT
Start: 2019-08-28 | End: 2019-08-31 | Stop reason: HOSPADM

## 2019-08-28 RX ORDER — METOPROLOL SUCCINATE 25 MG/1
50 TABLET, EXTENDED RELEASE ORAL AT BEDTIME
Status: DISCONTINUED | OUTPATIENT
Start: 2019-08-28 | End: 2019-08-31 | Stop reason: HOSPADM

## 2019-08-28 RX ORDER — GUAR GUM
1 PACKET (EA) ORAL DAILY
Status: DISCONTINUED | OUTPATIENT
Start: 2019-08-28 | End: 2019-08-31 | Stop reason: HOSPADM

## 2019-08-28 RX ORDER — PREDNISONE 5 MG/1
5 TABLET ORAL ONCE
Status: COMPLETED | OUTPATIENT
Start: 2019-08-28 | End: 2019-08-28

## 2019-08-28 RX ADMIN — APIXABAN 2.5 MG: 2.5 TABLET, FILM COATED ORAL at 07:50

## 2019-08-28 RX ADMIN — LEVOTHYROXINE SODIUM 150 MCG: 150 TABLET ORAL at 07:57

## 2019-08-28 RX ADMIN — APIXABAN 2.5 MG: 2.5 TABLET, FILM COATED ORAL at 20:12

## 2019-08-28 RX ADMIN — OXYCODONE HYDROCHLORIDE 5 MG: 5 TABLET ORAL at 02:30

## 2019-08-28 RX ADMIN — METOPROLOL SUCCINATE 50 MG: 25 TABLET, EXTENDED RELEASE ORAL at 21:28

## 2019-08-28 RX ADMIN — EZETIMIBE 10 MG: 10 TABLET ORAL at 20:12

## 2019-08-28 RX ADMIN — FOLIC ACID 1 MG: 1 TABLET ORAL at 07:50

## 2019-08-28 RX ADMIN — HYDRALAZINE HYDROCHLORIDE 25 MG: 25 TABLET ORAL at 21:29

## 2019-08-28 RX ADMIN — FERROUS GLUCONATE 324 MG: 324 TABLET ORAL at 18:22

## 2019-08-28 RX ADMIN — LOSARTAN POTASSIUM 25 MG: 25 TABLET ORAL at 10:17

## 2019-08-28 RX ADMIN — URSODIOL 250 MG: 250 TABLET, FILM COATED ORAL at 20:12

## 2019-08-28 RX ADMIN — FERROUS GLUCONATE 324 MG: 324 TABLET ORAL at 11:50

## 2019-08-28 RX ADMIN — VORICONAZOLE 200 MG: 200 TABLET, FILM COATED ORAL at 20:12

## 2019-08-28 RX ADMIN — FERROUS GLUCONATE 324 MG: 324 TABLET ORAL at 07:50

## 2019-08-28 RX ADMIN — VORICONAZOLE 200 MG: 200 TABLET, FILM COATED ORAL at 07:49

## 2019-08-28 RX ADMIN — METOPROLOL SUCCINATE 50 MG: 25 TABLET, EXTENDED RELEASE ORAL at 07:50

## 2019-08-28 RX ADMIN — URSODIOL 250 MG: 250 TABLET, FILM COATED ORAL at 07:50

## 2019-08-28 RX ADMIN — ERTAPENEM SODIUM 500 MG: 1 INJECTION, POWDER, LYOPHILIZED, FOR SOLUTION INTRAMUSCULAR; INTRAVENOUS at 02:07

## 2019-08-28 RX ADMIN — PREDNISONE 5 MG: 5 TABLET ORAL at 10:17

## 2019-08-28 RX ADMIN — PREDNISONE 5 MG: 5 TABLET ORAL at 07:49

## 2019-08-28 RX ADMIN — Medication 1 PACKET: at 11:49

## 2019-08-28 RX ADMIN — CALCITRIOL CAPSULES 0.25 MCG 0.5 MCG: 0.25 CAPSULE ORAL at 07:50

## 2019-08-28 RX ADMIN — SERTRALINE HYDROCHLORIDE 50 MG: 50 TABLET ORAL at 21:28

## 2019-08-28 ASSESSMENT — ACTIVITIES OF DAILY LIVING (ADL)
ADLS_ACUITY_SCORE: 17
ADLS_ACUITY_SCORE: 16
ADLS_ACUITY_SCORE: 14
ADLS_ACUITY_SCORE: 14
ADLS_ACUITY_SCORE: 17
ADLS_ACUITY_SCORE: 14

## 2019-08-28 ASSESSMENT — MIFFLIN-ST. JEOR: SCORE: 1377.72

## 2019-08-28 NOTE — PLAN OF CARE
Shift: 0700 - 1530  VS: Temp: 98.6  F (37  C) Temp src: Oral BP: (!) 170/65 Pulse: 66 Heart Rate: 66 Resp: 16 SpO2: 95 % O2 Device: None (Room air)    Pain: C/O back pain 1/10. Declined intervention.  Neuro: A&Ox4. Chronic neuropathy in BLE's. [place] in both ears, hearing aid in place. Calls appropriately.   Cardiac:   HTN, OVSS.   Respiratory: Lung sounds clear/diminished on RA.  GI/Diet/Appetite: Regular diet with good appetite. Adequate fluid intake. LBM 8/27.   :  Voiding w/o difficulty.   LDA's: PIV, SL  Skin: Intact.   Activity: Ax1 w/GB.  Tests/Procedures:    Pertinent Labs/Lab Collection: BC is gram negative rods for e-coli     Refused labs this afternoon, would like to know what they are for before drawn.   Plan: Abx therapy for infection.

## 2019-08-28 NOTE — PLAN OF CARE
A:  patient had episode of sharp shooting pain in mid right back. At 0200.  [facility] team Dr. Ratliff updated and orders received for oxycodone or dilaudid.  Oxycodone given with relief.  Patient up to bathroom with standby assist.  Neuropathy in feet so unsteady to start ambulating but after balanced does well.    R:  continue to monitor and treat per plan of care.

## 2019-08-28 NOTE — PLAN OF CARE
Pt A&Ox4. VSS on RA. HTN w/ PRN hydralazine given. Pt up assist x1 and gait belt. Pt voiding adequately. Pt denies pain and reports baseline neuropathy in BLE. PIV saline locked. PM .    Continue with plan of care.

## 2019-08-28 NOTE — PROGRESS NOTES
SPIRITUAL HEALTH SERVICES  [facility] ([place] 6A   ON-CALL VISIT    REFERRAL SOURCE: epic    I attempted to visit with [place] today, but she was visiting and asked for a visit later.    PLAN: I will attempt again today and/or inform unit  of this attempt.                                                                                                                                                Swathi Pretty  Staff   Pager [phone]

## 2019-08-28 NOTE — PROGRESS NOTES
[facility]    Medicine Progress Note - Hospitalist Service, Gold        Date of Admission:  8/26/2019  Assessment & Plan   Luz Thompson is a 69 year old female admitted on 8/26/2019. 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    #  Sepsis secondary to E coli bacteremia and pyelonephritis  Urine and blood positive for E coli.  Appreciate involvement of Transplant ID to guide antibiotic therapy.  -  Continue ertapenem  -  Daily blood cultures until they are turning negative    #  History of a liver transplant in 2002  #  Elevated transaminases  -  Appreciate involvement of Hepatology  -  Continue home immunosuppression  -  EBV PCR pending  -  Follow LFTs, had been elevated on admission but now downtrending and US negative    #  A fib  -  Continue apixaban, metoprolol, losartan, ezetimibe    #  Hypothyroid  -  Continue levothyroxine    #  Depression  -  Reinitiating sertraline, per outpatient plan will take 50mg at bedtime for 2 weeks and then increase to 100mg         Diet: Combination Diet Regular Diet Adult  Room Service    DVT Prophylaxis: Apixaban for a fib  Ron Catheter: not present  Code Status: Full Code      Disposition Plan   Expected discharge: 3-5 days, recommended to TCU vs home once antibiotic plan established.  Entered: Aliya Pace MD 08/28/2019, 3:58 PM       The patient's care was discussed with the Bedside Nurse, Patient and Patient's Family.    Aliya Pace MD  Hospitalist Service [address]  Pager: [phone]  Please see sticky note for cross cover information  ______________________________________________________________________    Interval History   Nursing notes reviewed, no acute events overnight.  No abdominal pain,  nausea/vomiting.  Had back pain overnight, but has not recurred.    Data reviewed today: I reviewed all medications, new labs and imaging results over the last 24 hours. I personally reviewed no images or EKG's today.    Physical Exam   Vital Signs: Temp: 98.6  F (37  C) Temp src: Oral BP: (!) 170/65 Pulse: 66 Heart Rate: 66 Resp: 16 SpO2: 95 % O2 Device: None (Room air)    Weight: 189 lbs 0 oz  Constitutional: Sitting in bed in NAD  ENT: EOMI, MMM  Respiratory: CTAB  Cardiovascular: Irregularly irregular, peripheral pulses intact  GI: NABS, soft, NT, ND    Data   Recent Labs   Lab 08/28/19  0528 08/27/19  0532 08/26/19  2331   WBC 7.2 9.2 12.5*   HGB 9.1* 8.4* 10.5*   MCV 89 91 86    191 226   INR  --   --  1.33*    142 135   POTASSIUM 4.3 3.6 3.6   CHLORIDE 108 109 100   CO2 22 22 26   BUN 43* 40* 41*   CR 1.85* 2.15* 2.38*   ANIONGAP 9 10 9   KEILY 7.7* 7.0* 8.1*   * 134* 209*   ALBUMIN 2.0* 1.8* 2.4*   PROTTOTAL 6.2* 5.4* 7.0   BILITOTAL 0.2 0.3 0.5   ALKPHOS 207* 198* 259*   * 120* 165*   AST 69* 87* 133*   LIPASE  --   --  66*
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
## 2019-08-28 NOTE — PROGRESS NOTES
Blood Sugar:  Pt states she checks her blood sugar 1-2 times daily. Says it has been elevated since her last cortisone shot. It was 199 pre-supper tonight. No orders for insulin and pt says she doesn't take anything for her blood sugar at home. Provider on call for gold service notified at 1221, Diana at 1900 of elevated blood sugar. No new orders received.

## 2019-08-28 NOTE — PROGRESS NOTES
[facility]  Transplant Infectious Disease Progress Note     Patient:  Luz Thompson, Date of birth 1950, Medical record number [id number]  Date of Visit:  08/28/2019          Assessment and Recommendations:   Recommendations:  - Please check a Voriconazole level with am labs.   - Please check a BCx x 3 sequential days, so that we will know in retrospect when to start the 14-day timeclock for ertapenem (on the day of the first negative BCx).  - Continue ertapenem while awaiting negative blood cultures, since she has multiple antibiotic allergies.  - Continue voriconazole 200 mg BID, since she does not tolerate fluconazole (severe rash) as well as having GI difficulty with posaconazole. She is aware of the risk of skin cancer with prolonged vori use, and combined with the increased risk of skin cancer because she is a transplant patient, & she is very willing to accept this risk.  - Use clotrimazole as needed for prn rx of thrush.    Transplant Infectious Disease will continue to follow with you.    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 She gets recurrent gram negative sepsis. She has known sigmoid diverticulosis. She gets episodes of defecation syncope.   ID issues:  - 8/26/2019 Gram negative odalys sepsis with E coli. , ESR 78. Pptat'd by back pain, expect this is an intraabdominal source such as a kidney transplant-associated UTI. Continue ertapenem while awaiting negative BCx.   - Coccidioides infection, diagnosis [id number] winter season. Followup CT imaging 6/27/2018 showed no worsening of the nodularities over a 1-yr interval. Follow-up Chest CT 5/21/2019 demonstrated unchanged 12 mm cavitary nodule in the lingula since 3/27/2018. However, this nodule had increased in size since 4/24/2017 when it measured 8 mm and was FDG avid on the PET/CT  8/8/2017, so she needs continued therapy. She had severe rash from fluconazole, and did not tolerate posaconazole very well (GI issues), so she would like to restart voriconazole. She does get dry skin and areas of frequent picking of skin with vori use. Prior auth for vori refills will be completed as needed. Vori had been restarted 8/9/2019, no level checked yet, but aminotransferase enzymes elevated. Would check a vori level with am labs.   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 She had some side effects in the days following the dose of Rituxan, but otherwise would be able to tolerate it again in the future. Checking EBV level annually.   - Left otitis. On ciprodex drops prn. Followed in ENT clinic due to perforation.   - Self-triggered use of prn antibiotics due to recurrent episodes of sepsis. She gets a lot of itching with vantin, and quinolones and macrolides don't seem to work that well for her, so her current prn med is bactrim. If the bactrim does not do the job in controlling fever, because she has allergies to penicillins and quinolones (cipro and levaquin), she knows to be seen somewhere where she could be evaluated for a once daily infusion of ertapenem (also called Invanz).   - Hx of E coli pyelonephritis in 3/2019, hospitalized twice in [place].   - Hx of bacterial superinfection of chronic venous stasis changes on the legs, 7/27/2016.  - Hx of recurrent E coli sepsis 8/13/2013, 6/10/2015.   - Hx of Klebsiella UTI, 7/18/16. She completed a 14-day course of macrobid.  - Hx of Haemophilus influenzae pneumonia in 9/2014.  - Hx of angular chelitis, hx of thrush extending from under her upper denture plate, and onychomycosis.   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   - QTc interval 439 msec on 8/7/2019 EKG.   - PCP prophylaxis: Not needed, as most recent CD4 count was > 200.   - Serostatus: CMV seronegative, EBV seropositive on 8/15/13.  - Immunization status: Completed PCV13 and PPSV23 x2 with last dose in  5/2016.  - Gamma globulin status: Endogenously replete.  - Isolation status: Good hand hygience.     Crystal Montero MD. Pager [phone]         Interval History:   Since Virginia was last seen by ID on 8/27/2019, she is feeling much better. There is less back pain, and as a result less need for pain meds, and less sleeping. She did have an episode overnight of a tremendous amount of sharp shooting pain on the right side of her back in the middle of the night, but that has since self-corrected. LFTs are improving. Neuropathy affects her balance. She is thinking about options for discharge, since she has had to give up her plane ticket to winter in [place] (that was due to occur on 9/1/2019). She [place] outside / south of the [facility], in [place], [place].    Transplants:  5/22/2002 (Liver), Postoperative day:  6307         Review of Systems:   CONSTITUTIONAL:  + improved fever curve, feels less cool   EYES: negative for icterus. Vision good with treatment of cataract and glaucoma issues.   ENT:  She wears hearing aids. She takes her dentures out at night. No sore throat.   RESPIRATORY:  No cough, no sputum, but sometimes she has dyspnea.   CARDIOVASCULAR:  negative for chest pain, no heart palpitations. Had a run of afib in 6/2019.  GASTROINTESTINAL:  negative for nausea or vomiting, but stools are soft  GENITOURINARY:  No dysuria or hematuria  HEME:  Normal amount of easy bruising, + easy bleeding from nosebleeds.   INTEGUMENT:  She has dry skin. No new rash.   NEURO:  No headache. No tremor.     Current medications:    apixaban ANTICOAGULANT  2.5 mg Oral BID     calcitRIOL  0.5 mcg Oral Daily     ertapenem (INVanz) IV  500 mg Intravenous Q24H     ezetimibe  10 mg Oral Daily     ferrous gluconate  324 mg Oral TID w/meals     fiber modular (NUTRISOURCE FIBER)  1 packet Oral or Feeding Tube Daily     folic acid  1 mg Oral Daily     levothyroxine  150 mcg Oral Every  Other Day     [START ON 8/29/2019] levothyroxine  175 mcg Oral Every Other Day     losartan  25 mg Oral Daily     metoprolol succinate ER  50 mg Oral At Bedtime     [START ON 8/29/2019] predniSONE  10 mg Oral Daily     sertraline  50 mg Oral At Bedtime     sirolimus  1 mg Oral Every Other Day     sodium chloride (PF)  3 mL Intracatheter Q8H     ursodiol  250 mg Oral BID     voriconazole  200 mg Oral BID       Allergies   Allergen Reactions     Fluconazole Hives and Itching     Azithromycin Itching     Benadryl [Diphenhydramine Hcl]      Insomnia      Cellcept Diarrhea     Ciprofloxacin Other (See Comments)     Insomnia, mood lability, Irregular heart beat, anxiety     Codeine      Psych disturbance     Diphenhydramine Other (See Comments)     Doxycycline      Lansoprazole Diarrhea     Levaquin [Levofloxacin] Other (See Comments)     Headache, hyperactivity     Lisinopril Cough     Methotrexate      Sores     Morphine Sulfate Itching     Mycophenolate Diarrhea     Simvastatin Muscle Pain (Myalgia)     severe     Cephalexin Rash     Fever and skin burning     Penicillin G Itching and Rash     Tolectin [Nsaids] Rash     Tramadol Rash            Physical Exam:   Vitals were reviewed.  All vitals stable.   Patient Vitals for the past 24 hrs:   BP Temp Temp src Pulse Resp SpO2 Weight   08/28/19 1018 118/60 -- -- 58 16 94 % --   08/28/19 0511 (!) 155/72 97.6  F (36.4  C) Oral 57 16 93 % --   08/28/19 0331 (!) 178/86 -- -- -- -- -- --   08/28/19 0200 (!) 181/91 97.8  F (36.6  C) Oral 63 16 91 % --   08/27/19 2152 (!) 163/76 98.3  F (36.8  C) Oral 60 16 95 % --   08/27/19 2008 -- -- -- -- -- -- 85.7 kg (189 lb)   08/27/19 1805 135/63 98.4  F (36.9  C) Oral 62 16 97 % --   08/27/19 1448 134/57 98.3  F (36.8  C) Oral 68 16 94 % --   08/27/19 1400 97/57 -- -- 63 -- -- --   08/27/19 1315 (!) 85/50 -- -- 64 -- -- --   08/27/19 1230 (!) 110/93 -- -- -- -- 90 % --   08/27/19 1200 (!) 89/53 -- -- -- -- 96 % --      Wt Readings from  Last 4 Encounters:   08/27/19 85.7 kg (189 lb)   08/22/19 84.8 kg (187 lb)   08/09/19 85.1 kg (187 lb 11.2 oz)   08/07/19 85.4 kg (188 lb 4.8 oz)       Physical Examination:  GENERAL:  well-developed, well-nourished woman, in no acute distress.  HEENT:  Head is normocephalic, atraumatic   EYES:  Eyes have anicteric sclerae.   ENT: Has only 1 hearing aid in place, so have to speak loudly. Dentures. No thrush. Mouth is dry.  NECK:  Supple.   LUNGS: Clear to auscultation bilateral.   CARDIOVASCULAR: Regular rate and rhythm with 2/6 systolic murmur   ABDOMEN: Normal bowel sounds, not tender. Subcostal surgical scar not otherwise examined.  SKIN:  No acute rash. She is not wearing stockings. Onychomycosis not examined today.   NEUROLOGIC:  Grossly nonfocal. Active x4 extremities         Laboratory Data:     Absolute CD4   Date Value Ref Range Status   08/19/2014 415 mm3 Final   09/16/2013 1,266 mm3 Final   09/15/2013 DUPLICATE,TESTING DONE ON SPECIMEN FROM 9.16.13 mm3 Final   11/13/2008 1332 mm3 Final       Inflammatory Markers    Recent Labs   Lab Test 08/26/19  2331 05/20/19  0957 07/30/18  0855 09/03/17  0912 09/02/17  0648 09/01/17  0832 05/14/16  0659 05/13/16  0940 09/23/14  0810 08/19/14  1530  06/30/14  0825 05/15/14  1112   SED 78* 47* 73*  --   --   --   --  67* 79* 76*  --  51* 58*   .0* <2.9 5.2 64.0* 71.0* 51.0* 21.0* 19.0* 6.1 29.8*   < > 12.5* 8.0    < > = values in this interval not displayed.       Immune Globulin Studies    Recent Labs   Lab Test 08/05/19  1552 08/19/14  1530 05/21/12  0754   IGG 1,110 1,220 1070     --  97   IGE  --  46  --      --  85   IGG1  --  684  --    IGG2  --  441  --    IGG3  --  70  --    IGG4  --  19  --        Metabolic Studies       Recent Labs   Lab Test 08/28/19  0528 08/27/19  0532 08/27/19  0403 08/26/19  2331 08/05/19  1552 05/20/19  0957 12/28/18 10/16/18  0905  04/17/18  0942  05/10/17  0929    142  --  135 139 138 140  --    < > 138   < >  138   POTASSIUM 4.3 3.6  --  3.6 2.9* 3.8 3.9  --    < > 3.9   < > 4.2   CHLORIDE 108 109  --  100 99 105 99  --    < > 102   < > 102   CO2 22 22  --  26 30 26 28  --    < > 28   < > 26   ANIONGAP 9 10  --  9 10 7 12  --    < > 8   < > 10   BUN 43* 40*  --  41* 36* 23 16  13.6  --    < > 36*   < > 36*   CR 1.85* 2.15*  --  2.38* 1.41* 1.14* 1.18  --    < > 1.74*   < > 1.53*   GFRESTIMATED 27* 23*  --  20* 38* 49* 47*  --    < > 29*   < > 34*   * 134*  --  209* 269* 94 103*  --    < > 125*   < > 104*   A1C  --   --   --   --   --   --   --  6.4*   < >  --   --   --    KEILY 7.7* 7.0*  --  8.1* 8.5 8.9 8.9  --    < > 9.1   < > 9.7   PHOS  --   --   --   --  3.3  --   --   --   --   --   --  3.1   MAG  --   --   --   --  2.2  --   --   --   --  2.8*   < > 2.8*   LACT  --   --  0.5* 1.8  --   --   --   --   --   --   --   --     < > = values in this interval not displayed.       Hepatic Studies    Recent Labs   Lab Test 08/28/19 0528 08/27/19  0532 08/26/19  2331 08/05/19  1552 05/20/19  0957 12/28/18   BILITOTAL 0.2 0.3 0.5 0.3 0.2 0.2   ALKPHOS 207* 198* 259* 213* 197* 227*   ALBUMIN 2.0* 1.8* 2.4* 3.0* 3.2* 3.8   AST 69* 87* 133* 25 20 20   * 120* 165* 40 36 19       Lipid testing    Recent Labs   Lab Test 05/20/19  0957 12/28/18 10/16/18  0902  09/18/15  0954   CHOL 185 225* 300*   < > 181   HDL 65 47 52   < > 63   LDL 91 110 194*   < > 84   TRIG 146 339* 269*   < > 172*   CHOLHDLRATIO  --  4.8*  --   --  2.9    < > = values in this interval not displayed.       Gout Labs      Recent Labs   Lab Test 08/05/19  1552 12/31/13  1443 07/30/13  1441   URIC 5.6 6.7 6.7       Hematology Studies      Recent Labs   Lab Test 08/28/19  0528 08/27/19  0532 08/26/19  2331 08/05/19  1552 05/20/19  0957 10/16/18  0902  05/18/18  0731  09/02/17  0648 09/01/17  0832 08/31/17  1306  05/16/16  0827   WBC 7.2 9.2 12.5* 9.1 8.4 7.2   < > 8.0   < > 10.2 9.4 10.6   < > 5.7   ANEU  --   --  10.8*  --   --   --   --  5.0  --   7.4 6.5 8.6*  --  2.5   ALYM  --   --  0.8  --   --   --   --  1.9  --  1.7 1.7 1.3  --  2.4   KATHY  --   --  0.7  --   --   --   --  1.0  --  0.9 1.0 0.7  --  0.6   AEOS  --   --  0.0  --   --   --   --  0.1  --  0.2 0.1 0.0  --  0.1   HGB 9.1* 8.4* 10.5* 11.5* 11.9 10.3*   < > 11.6*   < > 10.5* 11.1* 12.4   < > 11.1*   HCT 30.2* 28.9* 33.0* 35.7 37.2 33.1*   < > 36.0   < > 34.0* 35.0 38.5   < > 35.4    191 226 367 366 385   < > 324   < > 259 269 290   < > 256    < > = values in this interval not displayed.       Iron Testing    Recent Labs   Lab Test 08/28/19  0528  08/05/19  1552  07/30/18  0855  07/11/13  0814  12/22/12  1346  12/20/12 2005 12/07/12  1345   IRON  --   --  56  --  39  --  58   < >  --   --   --  48   FEB  --   --  267  --  307  --  285   < >  --   --   --  156*   IRONSAT  --   --  21  --  13*  --  20   < >  --   --   --  30   LAURA  --   --  118  --  18   < > 195   < >  --   --   --  317*   MCV 89   < > 88   < >  --    < > 88   < > 100   < > 103*  --    B12  --   --   --   --   --   --   --   --   --   --   --  281   [id number]  --   --   --   --   --   --   --   --   --   --   --  272*   HAPT  --   --   --   --   --   --   --   --   --   --  137  --    RETP  --   --   --   --   --   --   --   --  2.7*  --   --   --    RETICABSCT  --   --   --   --   --   --   --   --  71.0  --   --   --     < > = values in this interval not displayed.       Clotting Studies    Recent Labs   Lab Test 08/26/19  2331 05/18/18  0731 05/16/16  0827 05/13/16  0940  06/19/11  1815   INR 1.33* 1.06 1.02 1.11   < > 1.08   PTT 38* 33  --  33  --  28    < > = values in this interval not displayed.       Thyroid Studies     Recent Labs   Lab Test 05/20/19  0957 10/16/18  0902 07/30/18  0855 07/13/17  0843 05/10/17  0929  09/23/14  0810   TSH 12.11* 18.39* 8.64* 3.66 4.74*   < > 2.87   T4 0.99 0.85 1.02 1.59* 1.48*   < > 1.29  9.1   T3  --   --  44*  --   --   --  65    < > = values in this interval not displayed.        Urine Studies     Recent Labs   Lab Test 08/27/19  0224 08/05/19  1552 05/22/19  1212 10/16/18  0925 07/30/18  0852  08/31/17  2100   URINEPH 6.5 5.5 5.5 6.0 6.0   < > 8.0*   NITRITE Negative Negative Neg Negative Negative   < > Negative   LEUKEST Moderate* Negative Small Small* Moderate*   < > Small*   WBCU 12* 0 - 5  --  0 - 5 5-10*  --  2    < > = values in this interval not displayed.       Medication levels    Recent Labs   Lab Test 05/20/19  0957  10/23/17  1025  09/15/13  0435  12/21/12  1344   VANCOMYCIN  --   --   --   --  26.3  --   --    VCON 0.4*   < >  --   --   --   --   --    CYCLSP  --   --  Canceled, Test credited   < >  --   --   --    RAPAMY 5.5   < >  --    < >  --    < >  --    MPACID  --   --   --   --   --   --  5.42*   MPAG  --   --   --   --   --   --  83.5    < > = values in this interval not displayed.       Microbiology:    [facility]:        Last Culture results with specimen source  Culture Micro   Date Value Ref Range Status   08/27/2019 (A)  Preliminary    10,000 to 50,000 colonies/mL  Escherichia coli  Susceptibility testing in progress     08/27/2019 (A)  Preliminary    Cultured on the 1st day of incubation:  Escherichia coli     08/27/2019   Preliminary    Critical Value/Significant Value, preliminary result only, called to and read back by   Maria Teresa Martines RN 08/27/2019 @1425 dk/hdp     08/27/2019 Susceptibility testing done on previous specimen  Preliminary   08/26/2019 (A)  Preliminary    Cultured on the 1st day of incubation:  Escherichia coli     08/26/2019   Preliminary    Critical Value/Significant Value, preliminary result only, called to and read back by  NENO Robert at 1229 8.27.19.BRANDON     08/26/2019   Preliminary    (Note)  POSITIVE for E.COLI by Verigene multiplex nucleic acid test. Final  identification and antimicrobial susceptibility testing will be  verified by standard methods. Verigene test will not distinguish  E.coli from Shigella species  including S.dysenteriae, S.flexneri,  S.boydii, and S.sonnei. Specimens containing Shigella species or  E.coli will be reported as Positive for E.coli.    Specimen tested with [employer]igene multiplex, gram-negative blood culture  nucleic acid test for the following targets: Acinetobacter sp.,  Citrobacter sp., Enterobacter sp., Proteus sp., E. coli, K.  pneumoniae/oxytoca, P. aeruginosa, and the following resistance  markers: CTXM, KPC, NDM, VIM, IMP and OXA.    Critical Value/Significant Value called to and read back by  Flower Lujan Rn @ 1449 8.27.19        09/27/2018 Moderate growth  Staphylococcus aureus   (A)  Final   07/30/2018   Final    50,000 to 100,000 colonies/mL  mixed urogenital louann  Susceptibility testing not routinely done     07/18/2016 (A)  Final    50,000 to 100,000 colonies/mL Klebsiella pneumoniae   05/21/2016 No growth  Final   05/21/2016 No growth  Final   05/16/2016 Canceled, Test credited Duplicate request  Final   05/16/2016 Canceled, Test credited Duplicate request  Final   05/16/2016 No growth  Final   05/16/2016 No growth  Final   05/13/2016 No growth after 29 days  Final   05/13/2016   Final    Canceled, Test credited Quantity not sufficient Notification of test cancellation   was given to MATTHEW FROM [facility], HE WILL REORDER AND RECOLLECT     05/13/2016 No growth  Final   05/13/2016 No growth  Final    Specimen Description   Date Value Ref Range Status   08/27/2019 Midstream Urine  Final   08/27/2019 Blood Right Arm  Final   08/26/2019 Blood Right Arm  Final   09/27/2018 Right Hand Wound  Final   07/30/2018 Midstream Urine  Final   07/18/2016 Midstream Urine  Final   05/21/2016 Blood Right Arm  Final   05/21/2016 Blood Left Arm  Final   05/16/2016 Blood  Final   05/16/2016 Blood  Final   05/16/2016 Blood Left Arm  Final   05/16/2016 Blood Right Arm  Final   05/13/2016 Blood Unspecified Site  Final   05/13/2016 Blood Unspecified Site  Final   05/13/2016 Blood Right Arm  Final    05/13/2016 Blood Right Arm  Final   05/13/2016 Midstream Urine  Final   05/13/2016 Nasal  Final   05/13/2016 Blood Venipuncture Collection VPT  Final        Last check of C difficile  C Diff Toxin B PCR   Date Value Ref Range Status   05/17/2012   Final    Negative: Clostridium difficile target DNA sequences NOT detected, presumed   negative for Clostridium difficile toxin B or the number of bacteria present   may be below the limit of detection for the test.   FDA approved assay performed using [employer] real-time PCR.   A negative result does not exclude actual disease due to Clostridium difficile   and may be due to improper collection, handling and storage of the specimen or   the number of organisms in the specimen is below the detection limit of the   assay.       Virology:  CMV Quantitative   Date Value Ref Range Status   08/19/2014 <100 <100 Copies/mL Final   08/15/2013 <100 <100 Copies/mL Final   11/11/2008 <100 <100 Copies/mL Final     Comment:     No CMV DNA detected.   08/21/2007 <100 <100 Copies/mL Final     Comment:     No CMV DNA detected.   01/10/2007 <100 <100 Copies/mL Final     Comment:     No CMV DNA detected.       EBV DNA Copies/mL   Date Value Ref Range Status   07/30/2018 2,166 (A) EBVNEG^EBV DNA Not Detected [Copies]/mL Final   08/22/2017 EBV DNA Not Detected EBVNEG^EBV DNA Not Detected [Copies]/mL Final   04/11/2017 (A) EBVNEG [Copies]/mL Final    <500  EBV DNA Detected below the reportable range of 500 Copies/mL     12/09/2016 1,219 (A) EBVNEG [Copies]/mL Final   08/08/2016 29,569 (A) EBVNEG [Copies]/mL Final   07/26/2016 11,522 (A) EBVNEG [Copies]/mL Final   05/24/2016 24,676 (A) EBVNEG [Copies]/mL Final   05/13/2016 19,224 (A) EBVNEG [Copies]/mL Final   11/20/2015 25,676 (A) EBVNEG [Copies]/mL Final   09/14/2015 48,332 (A) EBVNEG [Copies]/mL Final   07/20/2015 48,923 (A) EBVNEG [Copies]/mL Final   09/15/2013 <1000 <1000 Copies/mL Final   08/15/2013 <1000 <1000 Copies/mL Final    11/12/2008 <1000 <1000 Copies/mL Final       BK viral loads   Recent Labs   Lab Test 07/28/11  1150   BKSPEC Urine   BKRES <1000       Imaging   CT CHEST W/O CONTRAST 6/27/2018 3:15 PM  Comparison: CT chest 3/27/2018, 7/31/2017, 4/24/2017; PET CT 8/8/2017   Findings: Chest: Heart size within normal limits. No pericardial effusion. The  thoracic aorta and pulmonary artery are normal caliber. No enlarged  thoracic lymph nodes by CT short axis size criteria.  Central airways are patent. No pleural effusion or pneumothorax.  Innumerable calcified granulomas, mostly clustered in the right lung  base, nicely change from the prior CT. There is a cavitary 12 mm  nodule in the lingula, which previously measured 1.2 cm on 3/27/2018  (however this measured 8 mm on 4/24/2017). 4 mm pulmonary nodule in  the left lower lobe (series 6 image 155) which is unchanged from 4/24/2017.  Upper abdomen: Limited evaluation of the upper abdomen. Unchanged  pneumobilia. Postsurgical changes of liver transplant. Atrophic  kidneys. Adrenal glands are normal.        Impression:   1. Unchanged 12 mm cavitary nodule in the lingula since 3/27/2018.  However, this nodule is increased in size since 4/24/2017 when it  measured 8 mm and was FDG avid on the PET/CT 8/8/2017. Recommend  continued close follow-up (3-6 months) and/or soft tissue biopsy.  2. Extensive granulomatous disease mostly clustered in the right lower lobe.     No results found for this or any previous visit (from the past 24 hour(s)).

## 2019-08-29 ENCOUNTER — APPOINTMENT (OUTPATIENT)
Dept: PHYSICAL THERAPY | Facility: CLINIC | Age: 69
DRG: 872 | End: 2019-08-29
Payer: MEDICARE

## 2019-08-29 ENCOUNTER — APPOINTMENT (OUTPATIENT)
Dept: OCCUPATIONAL THERAPY | Facility: CLINIC | Age: 69
DRG: 872 | End: 2019-08-29
Attending: INTERNAL MEDICINE
Payer: MEDICARE

## 2019-08-29 LAB
ALBUMIN SERPL-MCNC: 2.4 G/DL (ref 3.4–5)
ALP SERPL-CCNC: 218 U/L (ref 40–150)
ALT SERPL W P-5'-P-CCNC: 94 U/L (ref 0–50)
ANION GAP SERPL CALCULATED.3IONS-SCNC: 8 MMOL/L (ref 3–14)
AST SERPL W P-5'-P-CCNC: 41 U/L (ref 0–45)
BACTERIA SPEC CULT: ABNORMAL
BILIRUB SERPL-MCNC: 0.2 MG/DL (ref 0.2–1.3)
BUN SERPL-MCNC: 43 MG/DL (ref 7–30)
CALCIUM SERPL-MCNC: 8.8 MG/DL (ref 8.5–10.1)
CHLORIDE SERPL-SCNC: 110 MMOL/L (ref 94–109)
CO2 SERPL-SCNC: 21 MMOL/L (ref 20–32)
CREAT SERPL-MCNC: 1.62 MG/DL (ref 0.52–1.04)
ERYTHROCYTE [DISTWIDTH] IN BLOOD BY AUTOMATED COUNT: 15.5 % (ref 10–15)
GFR SERPL CREATININE-BSD FRML MDRD: 32 ML/MIN/{1.73_M2}
GLUCOSE BLDC GLUCOMTR-MCNC: 141 MG/DL (ref 70–99)
GLUCOSE BLDC GLUCOMTR-MCNC: 179 MG/DL (ref 70–99)
GLUCOSE BLDC GLUCOMTR-MCNC: 201 MG/DL (ref 70–99)
GLUCOSE SERPL-MCNC: 155 MG/DL (ref 70–99)
HCT VFR BLD AUTO: 32.8 % (ref 35–47)
HGB BLD-MCNC: 9.6 G/DL (ref 11.7–15.7)
Lab: ABNORMAL
Lab: ABNORMAL
MCH RBC QN AUTO: 26.4 PG (ref 26.5–33)
MCHC RBC AUTO-ENTMCNC: 29.3 G/DL (ref 31.5–36.5)
MCV RBC AUTO: 90 FL (ref 78–100)
PLATELET # BLD AUTO: 255 10E9/L (ref 150–450)
POTASSIUM SERPL-SCNC: 4.5 MMOL/L (ref 3.4–5.3)
PROT SERPL-MCNC: 6.6 G/DL (ref 6.8–8.8)
RBC # BLD AUTO: 3.63 10E12/L (ref 3.8–5.2)
SIROLIMUS BLD-MCNC: 9.8 UG/L (ref 5–15)
SODIUM SERPL-SCNC: 138 MMOL/L (ref 133–144)
SPECIMEN SOURCE: ABNORMAL
SPECIMEN SOURCE: ABNORMAL
TME LAST DOSE: NORMAL H
VORICONAZOLE SERPL-MCNC: 2.2 UG/ML (ref 1–5.5)
WBC # BLD AUTO: 7.3 10E9/L (ref 4–11)

## 2019-08-29 PROCEDURE — 40000141 ZZH STATISTIC PERIPHERAL IV START W/O US GUIDANCE

## 2019-08-29 PROCEDURE — 97112 NEUROMUSCULAR REEDUCATION: CPT | Mod: GP | Performed by: PHYSICAL THERAPIST

## 2019-08-29 PROCEDURE — 80299 QUANTITATIVE ASSAY DRUG: CPT | Performed by: NURSE PRACTITIONER

## 2019-08-29 PROCEDURE — 25000132 ZZH RX MED GY IP 250 OP 250 PS 637: Mod: GY | Performed by: STUDENT IN AN ORGANIZED HEALTH CARE EDUCATION/TRAINING PROGRAM

## 2019-08-29 PROCEDURE — 36415 COLL VENOUS BLD VENIPUNCTURE: CPT | Performed by: NURSE PRACTITIONER

## 2019-08-29 PROCEDURE — 87040 BLOOD CULTURE FOR BACTERIA: CPT | Performed by: INTERNAL MEDICINE

## 2019-08-29 PROCEDURE — 25000128 H RX IP 250 OP 636: Performed by: STUDENT IN AN ORGANIZED HEALTH CARE EDUCATION/TRAINING PROGRAM

## 2019-08-29 PROCEDURE — 80195 ASSAY OF SIROLIMUS: CPT | Performed by: NURSE PRACTITIONER

## 2019-08-29 PROCEDURE — 80053 COMPREHEN METABOLIC PANEL: CPT | Performed by: NURSE PRACTITIONER

## 2019-08-29 PROCEDURE — 25800030 ZZH RX IP 258 OP 636: Performed by: STUDENT IN AN ORGANIZED HEALTH CARE EDUCATION/TRAINING PROGRAM

## 2019-08-29 PROCEDURE — 25000132 ZZH RX MED GY IP 250 OP 250 PS 637: Mod: GY | Performed by: INTERNAL MEDICINE

## 2019-08-29 PROCEDURE — 97116 GAIT TRAINING THERAPY: CPT | Mod: GP | Performed by: PHYSICAL THERAPIST

## 2019-08-29 PROCEDURE — 97110 THERAPEUTIC EXERCISES: CPT | Mod: GO

## 2019-08-29 PROCEDURE — 12000001 ZZH R&B MED SURG/OB UMMC

## 2019-08-29 PROCEDURE — 00000146 ZZHCL STATISTIC GLUCOSE BY METER IP

## 2019-08-29 PROCEDURE — 97165 OT EVAL LOW COMPLEX 30 MIN: CPT | Mod: GO

## 2019-08-29 PROCEDURE — 85027 COMPLETE CBC AUTOMATED: CPT | Performed by: NURSE PRACTITIONER

## 2019-08-29 PROCEDURE — 97535 SELF CARE MNGMENT TRAINING: CPT | Mod: GO

## 2019-08-29 PROCEDURE — 99233 SBSQ HOSP IP/OBS HIGH 50: CPT | Performed by: INTERNAL MEDICINE

## 2019-08-29 PROCEDURE — 25000131 ZZH RX MED GY IP 250 OP 636 PS 637: Mod: GY | Performed by: INTERNAL MEDICINE

## 2019-08-29 PROCEDURE — 25000131 ZZH RX MED GY IP 250 OP 636 PS 637: Mod: GY | Performed by: STUDENT IN AN ORGANIZED HEALTH CARE EDUCATION/TRAINING PROGRAM

## 2019-08-29 RX ORDER — ACETAMINOPHEN 325 MG/1
650 TABLET ORAL EVERY 6 HOURS PRN
Status: DISCONTINUED | OUTPATIENT
Start: 2019-08-29 | End: 2019-08-31 | Stop reason: HOSPADM

## 2019-08-29 RX ORDER — HYDRALAZINE HYDROCHLORIDE 25 MG/1
25 TABLET, FILM COATED ORAL 3 TIMES DAILY PRN
Status: DISCONTINUED | OUTPATIENT
Start: 2019-08-29 | End: 2019-08-31 | Stop reason: HOSPADM

## 2019-08-29 RX ORDER — LIDOCAINE 40 MG/G
CREAM TOPICAL
Status: DISCONTINUED | OUTPATIENT
Start: 2019-08-29 | End: 2019-08-30

## 2019-08-29 RX ORDER — ERTAPENEM 1 G/1
1 INJECTION, POWDER, LYOPHILIZED, FOR SOLUTION INTRAMUSCULAR; INTRAVENOUS EVERY 24 HOURS
Status: DISCONTINUED | OUTPATIENT
Start: 2019-08-30 | End: 2019-08-31 | Stop reason: HOSPADM

## 2019-08-29 RX ORDER — NICOTINE POLACRILEX 4 MG
15-30 LOZENGE BUCCAL
Status: DISCONTINUED | OUTPATIENT
Start: 2019-08-29 | End: 2019-08-31 | Stop reason: HOSPADM

## 2019-08-29 RX ORDER — DEXTROSE MONOHYDRATE 25 G/50ML
25-50 INJECTION, SOLUTION INTRAVENOUS
Status: DISCONTINUED | OUTPATIENT
Start: 2019-08-29 | End: 2019-08-31 | Stop reason: HOSPADM

## 2019-08-29 RX ORDER — OMEGA-3-ACID ETHYL ESTERS 1 G/1
1 CAPSULE, LIQUID FILLED ORAL 2 TIMES DAILY
Status: DISCONTINUED | OUTPATIENT
Start: 2019-08-29 | End: 2019-08-31 | Stop reason: HOSPADM

## 2019-08-29 RX ADMIN — HYDRALAZINE HYDROCHLORIDE 25 MG: 25 TABLET ORAL at 16:32

## 2019-08-29 RX ADMIN — OMEGA-3-ACID ETHYL ESTERS 1 G: 1 CAPSULE, LIQUID FILLED ORAL at 19:53

## 2019-08-29 RX ADMIN — LOSARTAN POTASSIUM 25 MG: 25 TABLET ORAL at 07:28

## 2019-08-29 RX ADMIN — APIXABAN 2.5 MG: 2.5 TABLET, FILM COATED ORAL at 07:27

## 2019-08-29 RX ADMIN — ERTAPENEM SODIUM 500 MG: 1 INJECTION, POWDER, LYOPHILIZED, FOR SOLUTION INTRAMUSCULAR; INTRAVENOUS at 02:13

## 2019-08-29 RX ADMIN — EZETIMIBE 10 MG: 10 TABLET ORAL at 19:53

## 2019-08-29 RX ADMIN — PREDNISONE 10 MG: 10 TABLET ORAL at 07:28

## 2019-08-29 RX ADMIN — FERROUS GLUCONATE 324 MG: 324 TABLET ORAL at 11:09

## 2019-08-29 RX ADMIN — Medication 1 PACKET: at 07:30

## 2019-08-29 RX ADMIN — INSULIN ASPART 1 UNITS: 100 INJECTION, SOLUTION INTRAVENOUS; SUBCUTANEOUS at 17:55

## 2019-08-29 RX ADMIN — URSODIOL 250 MG: 250 TABLET, FILM COATED ORAL at 07:28

## 2019-08-29 RX ADMIN — OMEGA-3-ACID ETHYL ESTERS 1 G: 1 CAPSULE, LIQUID FILLED ORAL at 12:50

## 2019-08-29 RX ADMIN — ACETAMINOPHEN 650 MG: 325 TABLET, FILM COATED ORAL at 22:28

## 2019-08-29 RX ADMIN — SIROLIMUS 1 MG: 1 TABLET, SUGAR COATED ORAL at 07:35

## 2019-08-29 RX ADMIN — SERTRALINE HYDROCHLORIDE 50 MG: 50 TABLET ORAL at 22:23

## 2019-08-29 RX ADMIN — METOPROLOL SUCCINATE 50 MG: 25 TABLET, EXTENDED RELEASE ORAL at 22:22

## 2019-08-29 RX ADMIN — FOLIC ACID 1 MG: 1 TABLET ORAL at 07:28

## 2019-08-29 RX ADMIN — URSODIOL 250 MG: 250 TABLET, FILM COATED ORAL at 19:53

## 2019-08-29 RX ADMIN — LEVOTHYROXINE SODIUM 175 MCG: 150 TABLET ORAL at 07:31

## 2019-08-29 RX ADMIN — CALCITRIOL CAPSULES 0.25 MCG 0.5 MCG: 0.25 CAPSULE ORAL at 07:28

## 2019-08-29 RX ADMIN — APIXABAN 2.5 MG: 2.5 TABLET, FILM COATED ORAL at 19:53

## 2019-08-29 RX ADMIN — OXYCODONE HYDROCHLORIDE 5 MG: 5 TABLET ORAL at 07:27

## 2019-08-29 RX ADMIN — HYDRALAZINE HYDROCHLORIDE 25 MG: 25 TABLET ORAL at 11:09

## 2019-08-29 RX ADMIN — FERROUS GLUCONATE 324 MG: 324 TABLET ORAL at 07:28

## 2019-08-29 RX ADMIN — VORICONAZOLE 200 MG: 200 TABLET, FILM COATED ORAL at 19:53

## 2019-08-29 RX ADMIN — VORICONAZOLE 200 MG: 200 TABLET, FILM COATED ORAL at 07:28

## 2019-08-29 RX ADMIN — ESTRADIOL 2 G: 0.1 CREAM VAGINAL at 22:23

## 2019-08-29 RX ADMIN — FERROUS GLUCONATE 324 MG: 324 TABLET ORAL at 17:57

## 2019-08-29 ASSESSMENT — ACTIVITIES OF DAILY LIVING (ADL)
ADLS_ACUITY_SCORE: 16
PREVIOUS_RESPONSIBILITIES: MEAL PREP;HOUSEKEEPING;LAUNDRY;SHOPPING;MEDICATION MANAGEMENT;FINANCES;DRIVING
ADLS_ACUITY_SCORE: 16

## 2019-08-29 NOTE — PLAN OF CARE
Discharge Planner PT   Patient plan for discharge: TCU  Current status: SBA for sit to/from stand from EOB, armchair with use of single end cane; CGA to amb with cane on level 200 ft with instruction for sequencing, upright postural alignment  Barriers to return to prior living situation: medical status; gait instability  Recommendations for discharge: TCU  Rationale for recommendations: will benefit from continued skilled PT intervention to address mobility deficits, improve strength & activity tolerance       Entered by: Meri Gutierrez 08/29/2019 2:10 PM

## 2019-08-29 NOTE — PROGRESS NOTES
"   08/29/19 0800   Quick Adds   Type of Visit Initial Occupational Therapy Evaluation   Living Environment   Living Arrangements other (see comments);house  ([facility] [place] [facility])   Home Accessibility stairs to enter home   Number of Stairs, Main Entrance 4   Transportation Anticipated car, drives self   Living Environment Comment Pt just bought a new single story home in [place] where she will plan to move to once she is strong. Tub shower at new home. Pt does have the option to d/c to her daughters home which is also a single story home but has a walk-in shower.    Self-Care   Usual Activity Tolerance moderate   Current Activity Tolerance fair   Regular Exercise No   Equipment Currently Used at Home walker, standard   Activity/Exercise/Self-Care Comment Pt does have a 2WW although does not use it or want to useit. Reported IND withall self-cares although noted bathing is the mostdifficult and stressful due to fear of falling.Does have a skid free mat.   Functional Level   Ambulation 0-->independent   Transferring 0-->independent   Toileting 0-->independent   Bathing 0-->independent   Dressing 0-->independent   Eating 0-->independent   Communication 0-->understands/communicates without difficulty   Swallowing 0-->swallows foods/liquids without difficulty   Cognition 0 - no cognition issues reported   Fall history within last six months yes   Number of times patient has fallen within last six months 3   Which of the above functional risks had a recent onset or change? ambulation;transferring;bathing;fall history   Prior Functional Level Comment Previously IND with ADLs and amb   General Information   Onset of Illness/Injury or Date of Surgery - Date 08/26/19   Referring Physician Aliya Pace MD   Patient/Family Goals Statement To improve balance and move to [place]   Additional Occupational Profile Info/Pertinent History of Current Problem Luz Thompson is a 69 year old female with past " medical history including paroxysmal atrial fibrillation, HTN, HLD, CVA, thyroid cancer, CKD stage III, DM-2, recurrent e-coli sepsis, Sjogren's syndrome, PBS s/p liver transplant (2011) who presented to the ED with back pain likely secondary to UTI/pyelonephritis.    Precautions/Limitations fall precautions   General Info Comments Activity: Up with A   Cognitive Status Examination   Orientation orientation to person, place and time   Level of Consciousness alert   Follows Commands (Cognition) WNL   Memory intact   Visual Perception   Visual Perception Wears glasses   Sensory Examination   Sensory Comments baseline neuropathy in B toes - gets laser tx   Pain Assessment   Patient Currently in Pain No   Integumentary/Edema   Integumentary/Edema no deficits were identifed   Range of Motion (ROM)   ROM Comment WFL   Strength   Strength Comments Grossly 4/5 for BUE   Transfer Skill: Bed to Chair/Chair to Bed   Level of [place]: Bed to Chair contact guard   Transfer Skill: Sit to Stand   Level of [place]: Sit/Stand contact guard   Transfer Skill: Toilet Transfer   Level of [place]: Toilet contact guard   Balance   Balance Quick Add Standing balance: static;Standing balance: dynamic   Standing Balance: Static fair balance   Standing Balance: Dynamic poor balance   Lower Body Dressing   Level of [place]: Dress Lower Body stand-by assist   Toileting   Level of [place]: Toilet independent   Instrumental Activities of Daily Living (IADL)   Previous Responsibilities meal prep;housekeeping;laundry;shopping;medication management;finances;driving   IADL Comments Occasionally gets meals delivered.   Activities of Daily Living Analysis   Impairments Contributing to Impaired Activities of Daily Living balance impaired;strength decreased   General Therapy Interventions   Planned Therapy Interventions ADL retraining;IADL retraining;balance training;bed mobility training;strengthening;transfer training;risk  "factor education;progressive activity/exercise   Clinical Impression   Criteria for Skilled Therapeutic Interventions Met yes, treatment indicated   OT Diagnosis Decreased ability to perform ADL/IADLs safely   Influenced by the following impairments balance, medical status, weakness   Assessment of Occupational Performance 3-5 Performance Deficits   Identified Performance Deficits bathing, transfers, amb, home mgmt   Clinical Decision Making (Complexity) Low complexity   Therapy Frequency 5x/week   Predicted Duration of Therapy Intervention (days/wks) 1 week   Anticipated Discharge Disposition Transitional Care Facility   Risks and Benefits of Treatment have been explained. Yes   Patient, Family & other staff in agreement with plan of care Yes   Clinical Impression Comments Pt would benefit from continued skilled therapy to progress IND with ADLs   [facility] AM-PAC  \"6 Clicks\" Daily Activity Inpatient Short Form   1. Putting on and taking off regular lower body clothing? 4 - None   2. Bathing (including washing, rinsing, drying)? 3 - A Little   3. Toileting, which includes using toilet, bedpan or urinal? 4 - None   4. Putting on and taking off regular upper body clothing? 3 - A Little   5. Taking care of personal grooming such as brushing teeth? 3 - A Little   6. Eating meals? 4 - None   Daily Activity Raw Score (Score out of 24.Lower scores equate to lower levels of function) 21   Total Evaluation Time   Total Evaluation Time (Minutes) 5     "

## 2019-08-29 NOTE — PLAN OF CARE
A:  patient resting well between cares.  Denies pain.  Up to bathroom with one assist.  Voiding well.  R: continue to monitor and treat per plan of care.

## 2019-08-29 NOTE — PROGRESS NOTES
"Social Work: Assessment with Discharge Plan    Patient Name:  Luz Thompson  :  1950  Age:  69 year old  MRN:  [id number]  Completed assessment with:  Patient    Presenting Information   Reason for Referral:  Discharge plan  Date of Intake:  2019  Referral Source:  Nurse- NADEEM consult  Decision Maker:  Patient  Alternate Decision Maker:  Per policy, next of kin- pt's daughter   Health Care Directive:  None on file  Living Situation:  Currently living in cousin's basement. After TCU can d/c to friend's house or daughter's house. Planning for move to [place] for winter. [place] address on file is a forwarding address from pt's \"RV days\"- pt does not have physical residence in [place].   Previous Functional Status:  Independent  Patient and family understanding of hospitalization:  Restorative  Cultural/Language/Spiritual Considerations:  None identified  Adjustment to Illness:  Appropriate    Physical Health  Reason for Admission:    1. Acute kidney injury (H)    2. Elevated transaminase level    3. Other acute back pain    4. Dorsalgia      Services Needed/Recommended:  TCU    Mental Health/Chemical Dependency  Diagnosis:  Depression  Support/Services in Place:  Medication management  Services Needed/Recommended:  Continue above    Support System  Significant relationship at present time:  None identified  Family of origin is available for support:  Yes; pt's daughter lives in [place] and is supportive though pt states they get along better when they are farther away  Other support available:  Pt mentions friends as support  Gaps in support system:  None identified  Patient is caregiver to:  None     Provider Information   Primary Care Physician:  Jennifer Barraza   [phone]   Clinic:  [facility] [address] [id number]      :  N/A    Financial   Income Source:  Not discussed. 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   Financial Concerns:  None identified.   Insurance:  "   Payor/Plan Subscriber Name Rel Member # Group #   MEDICARE - MEDICARE ISA CAMARGO*  [id number]       ATTN CLAIMS, [address]   COMMERCIAL - CONTINEN* ISA CAMARGO* F [id number]       [address] [address]       Discharge Plan   Patient and family discharge goal:  TCU  Provided education on discharge plan:  YES  Patient agreeable to discharge plan:  YES  A list of Medicare Certified Facilities was provided to the patient and/or family to encourage patient choice. Patient's choices for facility are:  Pt requests  TCU as first choice and [facility] in [place] as second choice. Pt has been to both facilities in the past.   Will NH provide Skilled rehabilitation or complex medical:  YES  General information regarding anticipated insurance coverage and possible out of pocket cost was discussed. Patient and patient's family are aware patient may incur the cost of transportation to the facility, pending insurance payment: YES  Barriers to discharge:  Medical stability    Discharge Recommendations   Anticipated Disposition:  Facility:  TCU  Transportation Needs:  Friends/family vs wc van  Name of Transportation Company and Phone:  TBD    Additional comments   Pt is a 69-year-old female admitted with sepsis secondary to pyelonephritis and bacteremia. Pt states she has requested a TCU stay at discharge; PT and OT recommending TCU. Pt requests referrals to [facility] and [facility]. She is hoping one of these accepts her because she is not interested in other facilities. Faxed referral to [facility] and paged [facility] liaison.     HUMA Foster, [facility]  5th Floor   Pager [phone]  Phone [phone]

## 2019-08-29 NOTE — PROGRESS NOTES
[facility]  Transplant Infectious Disease Progress Note     Patient:  Luz Thompson, Date of birth 1950, Medical record number [id number]  Date of Visit:  08/29/2019          Assessment and Recommendations:   Recommendations:  - Continue ertapenem while awaiting negative blood cultures, since she has multiple antibiotic allergies. We will know in retrospect when to start the 14-day timeclock for ertapenem (on the day of the first negative BCx).  - Would prefer to use PIV for 14-day ertapenem course is possible, so that we don't damage her blood vessels. If needed, midline can be used for ertapenem.   - Planning to follow ertapenem with 14 days of oral cefpodoxime, 200 mg BID.   - Continue voriconazole 200 mg BID, since she does not tolerate fluconazole (severe rash) as well as having GI difficulty with posaconazole. She is aware of the risk of skin cancer with prolonged vori use, and combined with the increased risk of skin cancer because she is a transplant patient, & she is very willing to accept this risk.  - Use clotrimazole as needed for prn rx of thrush.    Transplant Infectious Disease will continue to follow with you.    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 She gets recurrent gram negative sepsis. She has known sigmoid diverticulosis. She gets episodes of defecation syncope.   ID issues:  - 8/26/2019 Gram negative odalys sepsis with E coli. , ESR 78. Pptat'd by back pain, expect this is an intraabdominal source such as a kidney transplant-associated UTI. Continue ertapenem while awaiting negative BCx.   - Coccidioides infection, diagnosis [id number] winter season. Followup CT imaging 6/27/2018 showed no worsening of the nodularities over a 1-yr interval. Follow-up Chest CT 5/21/2019 demonstrated unchanged 12 mm cavitary nodule in the lingula  since 3/27/2018. However, this nodule had increased in size since 4/24/2017 when it measured 8 mm and was FDG avid on the PET/CT 8/8/2017, so she needs continued therapy. She had severe rash from fluconazole, and did not tolerate posaconazole very well (GI issues), so she would like to restart voriconazole. She does get dry skin and areas of frequent picking of skin with vori use. Prior auth for vori refills will be completed as needed. Vori had been restarted 8/9/2019, 8/29/2019 level good at 2.2.  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 She had some side effects in the days following the dose of Rituxan, but otherwise would be able to tolerate it again in the future. Checking EBV level annually.   - Left otitis. On ciprodex drops prn. Followed in ENT clinic due to perforation.   - Self-triggered use of prn antibiotics due to recurrent episodes of sepsis. She gets a lot of itching with vantin, and quinolones and macrolides don't seem to work that well for her, so her current prn med is bactrim. If the bactrim does not do the job in controlling fever, because she has allergies to penicillins and quinolones (cipro and levaquin), she knows to be seen somewhere where she could be evaluated for a once daily infusion of ertapenem (also called Invanz).   - Hx of E coli pyelonephritis in 3/2019, hospitalized twice in [place].   - Hx of bacterial superinfection of chronic venous stasis changes on the legs, 7/27/2016.  - Hx of recurrent E coli sepsis 8/13/2013, 6/10/2015.   - Hx of Klebsiella UTI, 7/18/16. She completed a 14-day course of macrobid.  - Hx of Haemophilus influenzae pneumonia in 9/2014.  - Hx of angular chelitis, hx of thrush extending from under her upper denture plate, and onychomycosis.   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   - QTc interval 439 msec on 8/7/2019 EKG.   - PCP prophylaxis: Not needed, as most recent CD4 count was > 200.   - Serostatus: CMV seronegative, EBV seropositive on 8/15/13.  -  Immunization status: Completed PCV13 and PPSV23 x2 with last dose in 5/2016.  - Gamma globulin status: Endogenously replete.  - Isolation status: Good hand hygience.     Crystal Montero MD. Pager [phone]         Interval History:   Since Virginia was last seen by ID on 8/28/2019, she is feeling better, and sitting on the edge of her bed today. Ambulated with the assistance of a cane with PT. She's been a little lightheaded with ambulation. OT evaluation recommends discharging to TCU. She has been hypertensive, so hydralazine administered. Back pain is better, getting both heat therapy as well as oxycodone. No fever.     Transplants:  5/22/2002 (Liver), Postoperative day:  6308         Review of Systems:   CONSTITUTIONAL:  No fever.  EYES: negative for icterus. Vision good with treatment of cataract and glaucoma issues.   ENT:  She wears hearing aids. She takes her dentures out at night. No sore throat.   RESPIRATORY:  No cough, no sputum, but sometimes she has dyspnea.   CARDIOVASCULAR:  negative for chest pain, no heart palpitations. Had a run of afib in 6/2019.  GASTROINTESTINAL:  negative for nausea or vomiting, but stools are soft  GENITOURINARY:  No dysuria or hematuria  HEME:  Normal amount of easy bruising, + easy bleeding from nosebleeds.   INTEGUMENT:  She has dry skin. No new rash.   NEURO:  No headache. No tremor.     Current medications:    apixaban ANTICOAGULANT  2.5 mg Oral BID     calcitRIOL  0.5 mcg Oral Daily     ertapenem (INVanz) IV  500 mg Intravenous Q24H     estradiol  2 g Vaginal Once per day on Mon Thu     ezetimibe  10 mg Oral Daily     ferrous gluconate  324 mg Oral TID w/meals     fiber modular (NUTRISOURCE FIBER)  1 packet Oral or Feeding Tube Daily     folic acid  1 mg Oral Daily     insulin aspart  1-3 Units Subcutaneous TID AC     insulin aspart  1-3 Units Subcutaneous At Bedtime     levothyroxine  150 mcg Oral Every Other Day     levothyroxine  175 mcg Oral Every Other Day      losartan  25 mg Oral Daily     metoprolol succinate ER  50 mg Oral At Bedtime     omega-3 acid ethyl esters  1 g Oral BID     predniSONE  10 mg Oral Daily     sertraline  50 mg Oral At Bedtime     sirolimus  1 mg Oral Every Other Day     sodium chloride (PF)  3 mL Intracatheter Q8H     ursodiol  250 mg Oral BID     voriconazole  200 mg Oral BID       Allergies   Allergen Reactions     Fluconazole Hives and Itching     Azithromycin Itching     Benadryl [Diphenhydramine Hcl]      Insomnia      Cellcept Diarrhea     Ciprofloxacin Other (See Comments)     Insomnia, mood lability, Irregular heart beat, anxiety     Codeine      Psych disturbance     Diphenhydramine Other (See Comments)     Doxycycline      Lansoprazole Diarrhea     Levaquin [Levofloxacin] Other (See Comments)     Headache, hyperactivity     Lisinopril Cough     Methotrexate      Sores     Morphine Sulfate Itching     Mycophenolate Diarrhea     Simvastatin Muscle Pain (Myalgia)     severe     Cephalexin Rash     Fever and skin burning     Penicillin G Itching and Rash     Tolectin [Nsaids] Rash     Tramadol Rash            Physical Exam:   Vitals were reviewed.  All vitals stable.   Patient Vitals for the past 24 hrs:   BP Temp Temp src Pulse Resp SpO2 Weight   08/29/19 1112 (!) 146/66 -- -- -- -- -- --   08/29/19 0911 (!) 156/67 -- -- -- -- 92 % --   08/29/19 0219 (!) 164/69 -- -- 55 -- 93 % --   08/28/19 2356 (!) 184/84 -- -- 62 -- -- --   08/28/19 2236 -- 98.7  F (37.1  C) Oral -- 16 93 % --   08/28/19 2150 -- -- -- -- -- -- 85.7 kg (188 lb 14.4 oz)   08/28/19 2129 (!) 174/65 -- -- -- -- -- --   08/28/19 1947 (!) 162/61 -- -- -- -- -- --   08/28/19 1847 (!) 181/87 98.6  F (37  C) Oral -- 16 93 % --      Wt Readings from Last 4 Encounters:   08/28/19 85.7 kg (188 lb 14.4 oz)   08/22/19 84.8 kg (187 lb)   08/09/19 85.1 kg (187 lb 11.2 oz)   08/07/19 85.4 kg (188 lb 4.8 oz)       Physical Examination:  GENERAL:  well-developed, well-nourished woman, in no  acute distress.  HEENT:  Head is normocephalic, atraumatic   EYES:  Eyes have anicteric sclerae.   ENT: Has only 1 hearing aid in place, so have to speak loudly. Dentures. No thrush. Mouth is dry.  NECK:  Supple.   LUNGS: Clear to auscultation bilateral.   CARDIOVASCULAR: Regular rate and rhythm with 2/6 systolic murmur   ABDOMEN: Normal bowel sounds, not tender. Subcostal surgical scar not otherwise examined.  SKIN:  No acute rash. She is not wearing compression stockings. Onychomycosis not examined today.   NEUROLOGIC:  Grossly nonfocal. Active x4 extremities         Laboratory Data:     Absolute CD4   Date Value Ref Range Status   08/19/2014 415 mm3 Final   09/16/2013 1,266 mm3 Final   09/15/2013 DUPLICATE,TESTING DONE ON SPECIMEN FROM 9.16.13 mm3 Final   11/13/2008 1332 mm3 Final       Inflammatory Markers    Recent Labs   Lab Test 08/26/19  2331 05/20/19  0957 07/30/18  0855 09/03/17  0912 09/02/17  0648 09/01/17  0832 05/14/16  0659 05/13/16  0940 09/23/14  0810 08/19/14  1530  06/30/14  0825 05/15/14  1112   SED 78* 47* 73*  --   --   --   --  67* 79* 76*  --  51* 58*   .0* <2.9 5.2 64.0* 71.0* 51.0* 21.0* 19.0* 6.1 29.8*   < > 12.5* 8.0    < > = values in this interval not displayed.       Immune Globulin Studies    Recent Labs   Lab Test 08/05/19  1552 08/19/14  1530 05/21/12  0754   IGG 1,110 1,220 1070     --  97   IGE  --  46  --      --  85   IGG1  --  684  --    IGG2  --  441  --    IGG3  --  70  --    IGG4  --  19  --        Metabolic Studies       Recent Labs   Lab Test 08/29/19  0544 08/28/19  0528 08/27/19  0532 08/27/19  0403 08/26/19  2331 08/05/19  1552 05/20/19  0957  10/16/18  0905  04/17/18  0942  05/10/17  0929    139 142  --  135 139 138   < >  --    < > 138   < > 138   POTASSIUM 4.5 4.3 3.6  --  3.6 2.9* 3.8   < >  --    < > 3.9   < > 4.2   CHLORIDE 110* 108 109  --  100 99 105   < >  --    < > 102   < > 102   CO2 21 22 22  --  26 30 26   < >  --    < > 28   < >  26   ANIONGAP 8 9 10  --  9 10 7   < >  --    < > 8   < > 10   BUN 43* 43* 40*  --  41* 36* 23   < >  --    < > 36*   < > 36*   CR 1.62* 1.85* 2.15*  --  2.38* 1.41* 1.14*   < >  --    < > 1.74*   < > 1.53*   GFRESTIMATED 32* 27* 23*  --  20* 38* 49*   < >  --    < > 29*   < > 34*   * 165* 134*  --  209* 269* 94   < >  --    < > 125*   < > 104*   A1C  --   --   --   --   --   --   --   --  6.4*   < >  --   --   --    KEILY 8.8 7.7* 7.0*  --  8.1* 8.5 8.9   < >  --    < > 9.1   < > 9.7   PHOS  --   --   --   --   --  3.3  --   --   --   --   --   --  3.1   MAG  --   --   --   --   --  2.2  --   --   --   --  2.8*   < > 2.8*   LACT  --   --   --  0.5* 1.8  --   --   --   --   --   --   --   --     < > = values in this interval not displayed.       Hepatic Studies    Recent Labs   Lab Test 08/29/19  0544 08/28/19  0528 08/27/19  0532 08/26/19  2331 08/05/19  1552 05/20/19  0957   BILITOTAL 0.2 0.2 0.3 0.5 0.3 0.2   ALKPHOS 218* 207* 198* 259* 213* 197*   ALBUMIN 2.4* 2.0* 1.8* 2.4* 3.0* 3.2*   AST 41 69* 87* 133* 25 20   ALT 94* 112* 120* 165* 40 36       Lipid testing    Recent Labs   Lab Test 05/20/19  0957 12/28/18 10/16/18  0902  09/18/15  0954   CHOL 185 225* 300*   < > 181   HDL 65 47 52   < > 63   LDL 91 110 194*   < > 84   TRIG 146 339* 269*   < > 172*   CHOLHDLRATIO  --  4.8*  --   --  2.9    < > = values in this interval not displayed.       Gout Labs      Recent Labs   Lab Test 08/05/19  1552 12/31/13  1443 07/30/13  1441   URIC 5.6 6.7 6.7       Hematology Studies      Recent Labs   Lab Test 08/29/19  0544 08/28/19  0528 08/27/19  0532 08/26/19  2331 08/05/19  1552 05/20/19  0957  05/18/18  0731  09/02/17  0648 09/01/17  0832 08/31/17  1306  05/16/16  0827   WBC 7.3 7.2 9.2 12.5* 9.1 8.4   < > 8.0   < > 10.2 9.4 10.6   < > 5.7   ANEU  --   --   --  10.8*  --   --   --  5.0  --  7.4 6.5 8.6*  --  2.5   ALYM  --   --   --  0.8  --   --   --  1.9  --  1.7 1.7 1.3  --  2.4   KATHY  --   --   --  0.7  --    --   --  1.0  --  0.9 1.0 0.7  --  0.6   AEOS  --   --   --  0.0  --   --   --  0.1  --  0.2 0.1 0.0  --  0.1   HGB 9.6* 9.1* 8.4* 10.5* 11.5* 11.9   < > 11.6*   < > 10.5* 11.1* 12.4   < > 11.1*   HCT 32.8* 30.2* 28.9* 33.0* 35.7 37.2   < > 36.0   < > 34.0* 35.0 38.5   < > 35.4    225 191 226 367 366   < > 324   < > 259 269 290   < > 256    < > = values in this interval not displayed.       Iron Testing    Recent Labs   Lab Test 08/29/19  0544  08/05/19  1552  07/30/18  0855  07/11/13  0814  12/22/12  1346  12/20/12 2005 12/07/12  1345   IRON  --   --  56  --  39  --  58   < >  --   --   --  48   FEB  --   --  267  --  307  --  285   < >  --   --   --  156*   IRONSAT  --   --  21  --  13*  --  20   < >  --   --   --  30   LAURA  --   --  118  --  18   < > 195   < >  --   --   --  317*   MCV 90   < > 88   < >  --    < > 88   < > 100   < > 103*  --    B12  --   --   --   --   --   --   --   --   --   --   --  281   [id number]  --   --   --   --   --   --   --   --   --   --   --  272*   HAPT  --   --   --   --   --   --   --   --   --   --  137  --    RETP  --   --   --   --   --   --   --   --  2.7*  --   --   --    RETICABSCT  --   --   --   --   --   --   --   --  71.0  --   --   --     < > = values in this interval not displayed.       Clotting Studies    Recent Labs   Lab Test 08/26/19  2331 05/18/18  0731 05/16/16  0827 05/13/16  0940  06/19/11  1815   INR 1.33* 1.06 1.02 1.11   < > 1.08   PTT 38* 33  --  33  --  28    < > = values in this interval not displayed.       Thyroid Studies     Recent Labs   Lab Test 05/20/19  0957 10/16/18  0902 07/30/18  0855 07/13/17  0843 05/10/17  0929  09/23/14  0810   TSH 12.11* 18.39* 8.64* 3.66 4.74*   < > 2.87   T4 0.99 0.85 1.02 1.59* 1.48*   < > 1.29  9.1   T3  --   --  44*  --   --   --  65    < > = values in this interval not displayed.       Urine Studies     Recent Labs   Lab Test 08/27/19  0224 08/05/19  1552 05/22/19  1212 10/16/18  0925 07/30/18  0852   08/31/17  2100   URINEPH 6.5 5.5 5.5 6.0 6.0   < > 8.0*   NITRITE Negative Negative Neg Negative Negative   < > Negative   LEUKEST Moderate* Negative Small Small* Moderate*   < > Small*   WBCU 12* 0 - 5  --  0 - 5 5-10*  --  2    < > = values in this interval not displayed.       Medication levels    Recent Labs   Lab Test 08/29/19  0544  10/23/17  1025  09/15/13  0435  12/21/12  1344   VANCOMYCIN  --   --   --   --  26.3  --   --    VCON 2.2   < >  --   --   --   --   --    CYCLSP  --   --  Canceled, Test credited   < >  --   --   --    RAPAMY 9.8   < >  --    < >  --    < >  --    MPACID  --   --   --   --   --   --  5.42*   MPAG  --   --   --   --   --   --  83.5    < > = values in this interval not displayed.       Microbiology:    [facility]:        Last Culture results with specimen source  Culture Micro   Date Value Ref Range Status   08/29/2019 No growth after 5 hours  Preliminary   08/28/2019 No growth after 17 hours  Preliminary   08/28/2019 No growth after 17 hours  Preliminary   08/27/2019 10,000 to 50,000 colonies/mL  Escherichia coli   (A)  Final   08/27/2019 (A)  Final    Cultured on the 1st day of incubation:  Escherichia coli  Susceptibility testing done on previous specimen     08/27/2019   Final    Critical Value/Significant Value, preliminary result only, called to and read back by   Maria Teresa Martines RN 08/27/2019 @1425 dk/\[facility]\""     08/26/2019 (A)  Final    Cultured on the 1st day of incubation:  Escherichia coli     08/26/2019   Final    Critical Value/Significant Value, preliminary result only, called to and read back by  NENO Robert at 1229 8.27.19.BRANDON     08/26/2019   Final    (Note)  POSITIVE for E.COLI by Verigene multiplex nucleic acid test. Final  identification and antimicrobial susceptibility testing will be  verified by standard methods. Verigene test will not distinguish  E.coli from Shigella species including S.dysenteriae, S.flexneri,  S.boydii, and S.sonnei. Specimens  containing Shigella species or  E.coli will be reported as Positive for E.coli.    Specimen tested with Verigene multiplex, gram-negative blood culture  nucleic acid test for the following targets: Acinetobacter sp.,  Citrobacter sp., Enterobacter sp., Proteus sp., E. coli, K.  pneumoniae/oxytoca, P. aeruginosa, and the following resistance  markers: CTXM, KPC, NDM, VIM, IMP and OXA.    Critical Value/Significant Value called to and read back by  Flower Lujan Rn @ 1449 8.27.19        09/27/2018 Moderate growth  Staphylococcus aureus   (A)  Final   07/30/2018   Final    50,000 to 100,000 colonies/mL  mixed urogenital louann  Susceptibility testing not routinely done     07/18/2016 (A)  Final    50,000 to 100,000 colonies/mL Klebsiella pneumoniae   05/21/2016 No growth  Final   05/21/2016 No growth  Final   05/16/2016 Canceled, Test credited Duplicate request  Final   05/16/2016 Canceled, Test credited Duplicate request  Final   05/16/2016 No growth  Final   05/16/2016 No growth  Final   05/13/2016 No growth after 29 days  Final    Specimen Description   Date Value Ref Range Status   08/29/2019 Blood Left Arm  Final   08/28/2019 Blood Left Hand  Final   08/28/2019 Blood Right Hand  Final   08/27/2019 Midstream Urine  Final   08/27/2019 Blood Right Arm  Final   08/26/2019 Blood Right Arm  Final   09/27/2018 Right Hand Wound  Final   07/30/2018 Midstream Urine  Final   07/18/2016 Midstream Urine  Final   05/21/2016 Blood Right Arm  Final   05/21/2016 Blood Left Arm  Final   05/16/2016 Blood  Final   05/16/2016 Blood  Final   05/16/2016 Blood Left Arm  Final   05/16/2016 Blood Right Arm  Final   05/13/2016 Blood Unspecified Site  Final   05/13/2016 Blood Unspecified Site  Final   05/13/2016 Blood Right Arm  Final   05/13/2016 Blood Right Arm  Final        Last check of C difficile  C Diff Toxin B PCR   Date Value Ref Range Status   05/17/2012   Final    Negative: Clostridium difficile target DNA sequences NOT detected,  presumed   negative for Clostridium difficile toxin B or the number of bacteria present   may be below the limit of detection for the test.   FDA approved assay performed using [employer] GeneXpert real-time PCR.   A negative result does not exclude actual disease due to Clostridium difficile   and may be due to improper collection, handling and storage of the specimen or   the number of organisms in the specimen is below the detection limit of the   assay.       Virology:  CMV Quantitative   Date Value Ref Range Status   08/19/2014 <100 <100 Copies/mL Final   08/15/2013 <100 <100 Copies/mL Final   11/11/2008 <100 <100 Copies/mL Final     Comment:     No CMV DNA detected.   08/21/2007 <100 <100 Copies/mL Final     Comment:     No CMV DNA detected.   01/10/2007 <100 <100 Copies/mL Final     Comment:     No CMV DNA detected.       EBV DNA Copies/mL   Date Value Ref Range Status   07/30/2018 2,166 (A) EBVNEG^EBV DNA Not Detected [Copies]/mL Final   08/22/2017 EBV DNA Not Detected EBVNEG^EBV DNA Not Detected [Copies]/mL Final   04/11/2017 (A) EBVNEG [Copies]/mL Final    <500  EBV DNA Detected below the reportable range of 500 Copies/mL     12/09/2016 1,219 (A) EBVNEG [Copies]/mL Final   08/08/2016 29,569 (A) EBVNEG [Copies]/mL Final   07/26/2016 11,522 (A) EBVNEG [Copies]/mL Final   05/24/2016 24,676 (A) EBVNEG [Copies]/mL Final   05/13/2016 19,224 (A) EBVNEG [Copies]/mL Final   11/20/2015 25,676 (A) EBVNEG [Copies]/mL Final   09/14/2015 48,332 (A) EBVNEG [Copies]/mL Final   07/20/2015 48,923 (A) EBVNEG [Copies]/mL Final   09/15/2013 <1000 <1000 Copies/mL Final   08/15/2013 <1000 <1000 Copies/mL Final   11/12/2008 <1000 <1000 Copies/mL Final       BK viral loads   Recent Labs   Lab Test 07/28/11  1150   BKSPEC Urine   BKRES <1000       Imaging   CT CHEST W/O CONTRAST 6/27/2018 3:15 PM  Comparison: CT chest 3/27/2018, 7/31/2017, 4/24/2017; PET CT 8/8/2017   Findings: Chest: Heart size within normal limits. No pericardial  effusion. The  thoracic aorta and pulmonary artery are normal caliber. No enlarged  thoracic lymph nodes by CT short axis size criteria.  Central airways are patent. No pleural effusion or pneumothorax.  Innumerable calcified granulomas, mostly clustered in the right lung  base, nicely change from the prior CT. There is a cavitary 12 mm  nodule in the lingula, which previously measured 1.2 cm on 3/27/2018  (however this measured 8 mm on 4/24/2017). 4 mm pulmonary nodule in  the left lower lobe (series 6 image 155) which is unchanged from 4/24/2017.  Upper abdomen: Limited evaluation of the upper abdomen. Unchanged  pneumobilia. Postsurgical changes of liver transplant. Atrophic  kidneys. Adrenal glands are normal.        Impression:   1. Unchanged 12 mm cavitary nodule in the lingula since 3/27/2018.  However, this nodule is increased in size since 4/24/2017 when it  measured 8 mm and was FDG avid on the PET/CT 8/8/2017. Recommend  continued close follow-up (3-6 months) and/or soft tissue biopsy.  2. Extensive granulomatous disease mostly clustered in the right lower lobe.     No results found for this or any previous visit (from the past 24 hour(s)).

## 2019-08-29 NOTE — PLAN OF CARE
PT 5B: Evaluation completed and treatment initiated.   Discharge Planner PT   Patient plan for discharge: Open to PT recs.  If were to go home would go to her daughter's house with main level living  Current status: Presents with significant proximal LE weakness, impaired balance and gait.  Reaches for UE support at all times.  Amb x200ft with handhold assist from PT.  At falls risk.  Barriers to return to prior living situation: Falls risk  Recommendations for discharge: TCU  Rationale for recommendations: Pt has goals to regain independence and return to living independently in her senior living community in [place].  She has become quite deconditioned in last year.  Would benefit significantly from TCU for intensive strength and balance training, gait training to increase safety and independence.         Entered by: Inge Hammond 08/29/2019 5:47 AM

## 2019-08-29 NOTE — PROGRESS NOTES
"   08/28/19 1654   Quick Adds   Type of Visit Initial PT Evaluation   Living Environment   Lives With   (cousin)   Living Arrangements house   Home Accessibility stairs within home   Transportation Anticipated car, drives self   Living Environment Comment Recently living in the basement of her cousin's house with flight of stairs to access.  Has walk-in shower there.  Pt's plan would be to go to her daughter's house if needed at discharge with main level living and walk-in shower as well.   Self-Care   Usual Activity Tolerance moderate   Current Activity Tolerance moderate   Regular Exercise No   Equipment Currently Used at Home none   Activity/Exercise/Self-Care Comment Pt reports she has been very inactive and become \"debilitated\" in last year 2/2 emotional stressors.  She states her balance is \"very poor\" and is hesitant with gait.  She firmly refuses to use gait device, though.   Functional Level Prior   Ambulation 0-->independent   Transferring 0-->independent   Toileting 0-->independent   Bathing 0-->independent   Communication 0-->understands/communicates without difficulty   Swallowing 0-->swallows foods/liquids without difficulty   Cognition 0 - no cognition issues reported   Fall history within last six months yes   Number of times patient has fallen within last six months 3   General Information   Onset of Illness/Injury or Date of Surgery - Date 08/26/19   Referring Physician Aliya Pace MD   Patient/Family Goals Statement has goal to be more mobile and return to living in her senior living community in [place]   Pertinent History of Current Problem (include personal factors and/or comorbidities that impact the POC) 69 year old female admitted on 8/26/2019. 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   Precautions/Limitations fall " precautions   General Observations wears dentures, B hearing aids - one is in repair right now.  reading glasses   Cognitive Status Examination   Orientation orientation to person, place and time   Level of Consciousness alert   Follows Commands and Answers Questions 100% of the time   Personal Safety and Judgment intact   Memory intact   Pain Assessment   Patient Currently in Pain No   Posture    Posture Forward head position;Protracted shoulders   Range of Motion (ROM)   ROM Comment WFL BLE   Strength   Strength Comments significant proximal LE weakness, unable to stand without heavy BUE support   Bed Mobility   Bed Mobility Comments min A sit<supine   Transfer Skills   Transfer Comments mod (I) sit<>stand with heavy BUE use   Gait   Gait Comments prefers UE support either reaching for surfaces or HHA from PT.  has path deviations which worsen with vestibular input.  foot flat at initial contacts.  generally unsteady   Balance   Balance Comments loses balance with NBOS eyes closed.  dynamic standing is fair.   Sensory Examination   Sensory Perception Comments baseline N/T B feet from neuropathy   General Therapy Interventions   Planned Therapy Interventions balance training;gait training;strengthening;neuromuscular re-education;progressive activity/exercise;home program guidelines;transfer training   Clinical Impression   Criteria for Skilled Therapeutic Intervention yes, treatment indicated   PT Diagnosis impaired functional mobility   Influenced by the following impairments balance, strength, sensation   Functional limitations due to impairments gait, transfers, stairs   Clinical Presentation Stable/Uncomplicated   Clinical Presentation Rationale clinical judgment   Clinical Decision Making (Complexity) Low complexity   Therapy Frequency 5x/week   Predicted Duration of Therapy Intervention (days/wks) 1 week   Anticipated Discharge Disposition Transitional Care Facility   Risk & Benefits of therapy have been  "explained Yes   Patient, Family & other staff in agreement with plan of care Yes   [facility] AM-PAC  \"6 Clicks\" V.2 Basic Mobility Inpatient Short Form   1. Turning from your back to your side while in a flat bed without using bedrails? 4 - None   2. Moving from lying on your back to sitting on the side of a flat bed without using bedrails? 4 - None   3. Moving to and from a bed to a chair (including a wheelchair)? 4 - None   4. Standing up from a chair using your arms (e.g., wheelchair, or bedside chair)? 4 - None   5. To walk in hospital room? 3 - A Little   6. Climbing 3-5 steps with a railing? 3 - A Little   Basic Mobility Raw Score (Score out of 24.Lower scores equate to lower levels of function) 22   Total Evaluation Time   Total Evaluation Time (Minutes) 8     "

## 2019-08-29 NOTE — PROGRESS NOTES
SPIRITUAL HEALTH SERVICES  SPIRITUAL ASSESSMENT Progress Note  [facility] 5B  On-Call    PRIMARY FOCUS: Support for coping; responded to Ashley's request for chaplaincy care    ILLNESS CIRCUMSTANCES:   Reviewed documentation. Reflective conversation shared with Ashley integrating aspects of her illness and family narratives.     Context of Serious Illness/Symptom(s) - Ashley offered an overview of the circumstances contributing to her current admission and her desire to return to her ideal functional status.    Resources for Support - Children; grandchildren; network of friends    DISTRESS:     Emotional/Spiritual/Existential Distress -   kamlesh Lamented the abrupt end to her marriage of almost forty years.  o Described the itinerant lifestyle she's necessarily lived since then as a result as she seeks stabilization of where and how she calls home.  o Narrated the recent circumstances of the sudden death involving her partner in a romantic relationship.    Scientologist Distress - discussed; declined    Social/Cultural/Economic Distress - confirmed; processed how her destabilized life contributed to the decline of her health    SPIRITUAL/Adventist COPING:     Advent/Heather -   o Presybeterian heather is a positive source of coping;   o She's not a consistent part of a Hoahaoism currently as her life as of late has not been as stable as desired    Spiritual Practice(s) - Prayer, which was also shared    Emotional/Relational/Existential Connections - Relationships are integral for sense of self and from where she draws well-being.    GOALS OF CARE:    Goals of Care - Move back to [place] and reestablish her life there.    Meaning/Sense-Making - Ashley, while sharing her feelings of loss about not being able to be by her children and grandchildren in [place], she named her clarity for how it will improve her quality of life.    PLAN: I have no plan follow-up.    Will Mauro, MPH, M.Div., [facility]  Staff   Pager  [phone]  [facility] remains available 24/7 for emergent requests/referrals, either by having the switchboard page the on-call  or by entering an ASAP/STAT consult in Epic (this will also page the on-call ).

## 2019-08-29 NOTE — PROGRESS NOTES
[facility] Progress Note - Hospitalist Service, Gold 3       Date of Admission:  8/26/2019  Assessment & Plan   Luz Thompson is a 69 year old female admitted on 8/26/2019. 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    #  Sepsis secondary to E coli bacteremia and pyelonephritis with DERREK  Urine and blood positive for E coli.  DERREK improving.  Appreciate involvement of Transplant ID to guide antibiotic therapy.  -  Continue ertapenem for now, will work with ID in the upcoming days to identify a good outpatient antibiotic regimen pending negative blood cultures  -  Daily blood cultures until they are turning negative    #  History of a liver transplant in 2002  #  Elevated transaminases  -  Appreciate involvement of Hepatology  -  Continue home immunosuppression  -  EBV PCR pending  -  Follow LFTs, had been elevated on admission but now downtrending and US negative    #  A fib  -  Continue apixaban, metoprolol, losartan, ezetimibe  -  Receives alirocumab (Praluent) every 2 weeks as an outpatient, will hold while inpatient     #  Hypothyroid  -  Continue levothyroxine    #  Depression  -  Reinitiating sertraline, per outpatient plan will take 50mg at bedtime for 2 weeks and then increase to 100mg.  Keeping 50mg at bedtime for now.         Diet: Combination Diet Regular Diet Adult  Room Service    DVT Prophylaxis: Apixaban for a fib  Ron Catheter: not present  Code Status: Full Code      Disposition Plan   Expected discharge: 3-5 days, recommended to TCU vs home once antibiotic plan established.  Entered: Aliya Pace MD 08/29/2019, 7:43 AM       The patient's care was discussed with the Bedside Nurse, Patient and Patient's Family.    Aliya Pace MD  Hospitalist ServiceAlcon  3  [facility] [place]  Pager: [phone]  Please see sticky note for cross cover information  ______________________________________________________________________    Interval History   Nursing notes reviewed, no acute events overnight.  Feeling well.  Having some right sided back discomfort, more so with movements.  No fevers, no nausea/vomiting.  Has a good appetite.  No abdominal pain.    Data reviewed today: I reviewed all medications, new labs and imaging results over the last 24 hours. I personally reviewed no images or EKG's today.    Physical Exam   Vital Signs: Temp: 98.7  F (37.1  C) Temp src: Oral BP: (!) (P) 164/69 Pulse: (P) 55 Heart Rate: 57 Resp: 16 SpO2: (P) 93 % O2 Device: None (Room air)    Weight: 188 lbs 14.4 oz  Constitutional: Sitting on the edge of the bed in NAD  ENT: EOMI, MMM  Respiratory: CTAB  Cardiovascular: Irregularly irregular, peripheral pulses intact  GI: NABS, soft, NT, ND  MSK:  Right sided low thoracic tenderness to firm palpation    Data   Recent Labs   Lab 08/29/19  0544 08/28/19  0528 08/27/19  0532 08/26/19  2331   WBC 7.3 7.2 9.2 12.5*   HGB 9.6* 9.1* 8.4* 10.5*   MCV 90 89 91 86    225 191 226   INR  --   --   --  1.33*    139 142 135   POTASSIUM 4.5 4.3 3.6 3.6   CHLORIDE 110* 108 109 100   CO2 21 22 22 26   BUN 43* 43* 40* 41*   CR 1.62* 1.85* 2.15* 2.38*   ANIONGAP 8 9 10 9   KEILY 8.8 7.7* 7.0* 8.1*   * 165* 134* 209*   ALBUMIN 2.4* 2.0* 1.8* 2.4*   PROTTOTAL 6.6* 6.2* 5.4* 7.0   BILITOTAL 0.2 0.2 0.3 0.5   ALKPHOS 218* 207* 198* 259*   ALT 94* 112* 120* 165*   AST 41 69* 87* 133*   LIPASE  --   --   --  66*

## 2019-08-29 NOTE — PLAN OF CARE
Pt A&Ox4. VSS on RA. HTN w/ PRN hydralazine given. Pt up assist x1. Pt voiding adequately. Pt c/o pain in back adequately relieved w/ heat and PRN oxycodone x1. Pt reports baseline neuropathy in BLE and some lightheadedness w/ ambulation. PIV saline locked.     Possible discharge to TCU for PT w/ negative blood cultures and switch to PO antibiotics.      Continue with plan of care.

## 2019-08-29 NOTE — PLAN OF CARE
Discharge Planner OT   Patient plan for discharge: TCU  Current status: OT eval completed, tx initiated. SBA for bed mobility and LB dressing, CGA toilet transfer and IND kayla cares, close CGA for amb ~70ft while pushing IV pole with slow gait and pt unsteady on feet, pt adamantly rejecting use of 2WW although acknowledges decreased balance, agreeable to SPC, x6 repeated STS for strengthening  Barriers to return to prior living situation: medical status, balance, lives alone  Recommendations for discharge: TCU  Rationale for recommendations: Pt will require continued skilled therapy to progress IND with ADL/IADLs, functional mobility, strength, and endurance.        Entered by: Rosy Thompson 08/29/2019 10:33 AM

## 2019-08-30 ENCOUNTER — APPOINTMENT (OUTPATIENT)
Dept: OCCUPATIONAL THERAPY | Facility: CLINIC | Age: 69
DRG: 872 | End: 2019-08-30
Payer: MEDICARE

## 2019-08-30 ENCOUNTER — APPOINTMENT (OUTPATIENT)
Dept: PHYSICAL THERAPY | Facility: CLINIC | Age: 69
DRG: 872 | End: 2019-08-30
Payer: MEDICARE

## 2019-08-30 LAB
ALBUMIN SERPL-MCNC: 2.3 G/DL (ref 3.4–5)
ALP SERPL-CCNC: 202 U/L (ref 40–150)
ALT SERPL W P-5'-P-CCNC: 72 U/L (ref 0–50)
ANION GAP SERPL CALCULATED.3IONS-SCNC: 6 MMOL/L (ref 3–14)
AST SERPL W P-5'-P-CCNC: 20 U/L (ref 0–45)
BILIRUB SERPL-MCNC: 0.2 MG/DL (ref 0.2–1.3)
BUN SERPL-MCNC: 37 MG/DL (ref 7–30)
CALCIUM SERPL-MCNC: 8.8 MG/DL (ref 8.5–10.1)
CHLORIDE SERPL-SCNC: 109 MMOL/L (ref 94–109)
CO2 SERPL-SCNC: 25 MMOL/L (ref 20–32)
CREAT SERPL-MCNC: 1.53 MG/DL (ref 0.52–1.04)
EBV DNA # SPEC NAA+PROBE: 2386 {COPIES}/ML
EBV DNA SPEC NAA+PROBE-LOG#: 3.4 {LOG_COPIES}/ML
ERYTHROCYTE [DISTWIDTH] IN BLOOD BY AUTOMATED COUNT: 15.3 % (ref 10–15)
GFR SERPL CREATININE-BSD FRML MDRD: 34 ML/MIN/{1.73_M2}
GLUCOSE BLDC GLUCOMTR-MCNC: 126 MG/DL (ref 70–99)
GLUCOSE BLDC GLUCOMTR-MCNC: 156 MG/DL (ref 70–99)
GLUCOSE BLDC GLUCOMTR-MCNC: 161 MG/DL (ref 70–99)
GLUCOSE BLDC GLUCOMTR-MCNC: 241 MG/DL (ref 70–99)
GLUCOSE BLDC GLUCOMTR-MCNC: 247 MG/DL (ref 70–99)
GLUCOSE BLDC GLUCOMTR-MCNC: 256 MG/DL (ref 70–99)
GLUCOSE SERPL-MCNC: 120 MG/DL (ref 70–99)
HCT VFR BLD AUTO: 30.8 % (ref 35–47)
HGB BLD-MCNC: 9.2 G/DL (ref 11.7–15.7)
MCH RBC QN AUTO: 26.7 PG (ref 26.5–33)
MCHC RBC AUTO-ENTMCNC: 29.9 G/DL (ref 31.5–36.5)
MCV RBC AUTO: 90 FL (ref 78–100)
PLATELET # BLD AUTO: 274 10E9/L (ref 150–450)
POTASSIUM SERPL-SCNC: 4.4 MMOL/L (ref 3.4–5.3)
PROT SERPL-MCNC: 6.4 G/DL (ref 6.8–8.8)
RBC # BLD AUTO: 3.44 10E12/L (ref 3.8–5.2)
SODIUM SERPL-SCNC: 140 MMOL/L (ref 133–144)
WBC # BLD AUTO: 6 10E9/L (ref 4–11)

## 2019-08-30 PROCEDURE — 99233 SBSQ HOSP IP/OBS HIGH 50: CPT | Performed by: INTERNAL MEDICINE

## 2019-08-30 PROCEDURE — 87040 BLOOD CULTURE FOR BACTERIA: CPT | Performed by: INTERNAL MEDICINE

## 2019-08-30 PROCEDURE — 25000128 H RX IP 250 OP 636: Performed by: INTERNAL MEDICINE

## 2019-08-30 PROCEDURE — 97110 THERAPEUTIC EXERCISES: CPT | Mod: GP

## 2019-08-30 PROCEDURE — 80053 COMPREHEN METABOLIC PANEL: CPT | Performed by: INTERNAL MEDICINE

## 2019-08-30 PROCEDURE — 97112 NEUROMUSCULAR REEDUCATION: CPT | Mod: GP

## 2019-08-30 PROCEDURE — 97535 SELF CARE MNGMENT TRAINING: CPT | Mod: GO

## 2019-08-30 PROCEDURE — 25000132 ZZH RX MED GY IP 250 OP 250 PS 637: Mod: GY | Performed by: STUDENT IN AN ORGANIZED HEALTH CARE EDUCATION/TRAINING PROGRAM

## 2019-08-30 PROCEDURE — 12000001 ZZH R&B MED SURG/OB UMMC

## 2019-08-30 PROCEDURE — 00000146 ZZHCL STATISTIC GLUCOSE BY METER IP

## 2019-08-30 PROCEDURE — 25000131 ZZH RX MED GY IP 250 OP 636 PS 637: Mod: GY | Performed by: INTERNAL MEDICINE

## 2019-08-30 PROCEDURE — 97116 GAIT TRAINING THERAPY: CPT | Mod: GP

## 2019-08-30 PROCEDURE — 25000132 ZZH RX MED GY IP 250 OP 250 PS 637: Mod: GY | Performed by: INTERNAL MEDICINE

## 2019-08-30 PROCEDURE — 85027 COMPLETE CBC AUTOMATED: CPT | Performed by: INTERNAL MEDICINE

## 2019-08-30 PROCEDURE — 36415 COLL VENOUS BLD VENIPUNCTURE: CPT | Performed by: INTERNAL MEDICINE

## 2019-08-30 RX ORDER — HEPARIN SODIUM,PORCINE 10 UNIT/ML
2-5 VIAL (ML) INTRAVENOUS
Status: DISCONTINUED | OUTPATIENT
Start: 2019-08-30 | End: 2019-08-31 | Stop reason: HOSPADM

## 2019-08-30 RX ORDER — SENNOSIDES 8.6 MG
8.6 TABLET ORAL 2 TIMES DAILY PRN
Status: DISCONTINUED | OUTPATIENT
Start: 2019-08-30 | End: 2019-08-31 | Stop reason: HOSPADM

## 2019-08-30 RX ORDER — LIDOCAINE 40 MG/G
CREAM TOPICAL
Status: DISCONTINUED | OUTPATIENT
Start: 2019-08-30 | End: 2019-08-31 | Stop reason: HOSPADM

## 2019-08-30 RX ADMIN — URSODIOL 250 MG: 250 TABLET, FILM COATED ORAL at 08:02

## 2019-08-30 RX ADMIN — EZETIMIBE 10 MG: 10 TABLET ORAL at 21:57

## 2019-08-30 RX ADMIN — VORICONAZOLE 200 MG: 200 TABLET, FILM COATED ORAL at 07:55

## 2019-08-30 RX ADMIN — CALCITRIOL CAPSULES 0.25 MCG 0.5 MCG: 0.25 CAPSULE ORAL at 07:55

## 2019-08-30 RX ADMIN — FERROUS GLUCONATE 324 MG: 324 TABLET ORAL at 07:55

## 2019-08-30 RX ADMIN — HYDRALAZINE HYDROCHLORIDE 25 MG: 25 TABLET ORAL at 06:51

## 2019-08-30 RX ADMIN — Medication 1 PACKET: at 07:55

## 2019-08-30 RX ADMIN — METOPROLOL SUCCINATE 50 MG: 25 TABLET, EXTENDED RELEASE ORAL at 21:59

## 2019-08-30 RX ADMIN — INSULIN ASPART 1 UNITS: 100 INJECTION, SOLUTION INTRAVENOUS; SUBCUTANEOUS at 17:29

## 2019-08-30 RX ADMIN — FERROUS GLUCONATE 324 MG: 324 TABLET ORAL at 17:29

## 2019-08-30 RX ADMIN — LOSARTAN POTASSIUM 25 MG: 25 TABLET ORAL at 07:55

## 2019-08-30 RX ADMIN — INSULIN ASPART 2 UNITS: 100 INJECTION, SOLUTION INTRAVENOUS; SUBCUTANEOUS at 12:15

## 2019-08-30 RX ADMIN — URSODIOL 250 MG: 250 TABLET, FILM COATED ORAL at 21:58

## 2019-08-30 RX ADMIN — LEVOTHYROXINE SODIUM 150 MCG: 150 TABLET ORAL at 08:02

## 2019-08-30 RX ADMIN — FOLIC ACID 1 MG: 1 TABLET ORAL at 07:55

## 2019-08-30 RX ADMIN — FERROUS GLUCONATE 324 MG: 324 TABLET ORAL at 12:15

## 2019-08-30 RX ADMIN — OMEGA-3-ACID ETHYL ESTERS 1 G: 1 CAPSULE, LIQUID FILLED ORAL at 07:55

## 2019-08-30 RX ADMIN — SENNOSIDES 8.6 MG: 8.6 TABLET, FILM COATED ORAL at 14:21

## 2019-08-30 RX ADMIN — SERTRALINE HYDROCHLORIDE 50 MG: 50 TABLET ORAL at 21:58

## 2019-08-30 RX ADMIN — VORICONAZOLE 200 MG: 200 TABLET, FILM COATED ORAL at 21:59

## 2019-08-30 RX ADMIN — PREDNISONE 10 MG: 10 TABLET ORAL at 07:55

## 2019-08-30 RX ADMIN — OMEGA-3-ACID ETHYL ESTERS 1 G: 1 CAPSULE, LIQUID FILLED ORAL at 21:58

## 2019-08-30 RX ADMIN — HYDRALAZINE HYDROCHLORIDE 25 MG: 25 TABLET ORAL at 13:52

## 2019-08-30 RX ADMIN — APIXABAN 2.5 MG: 2.5 TABLET, FILM COATED ORAL at 07:54

## 2019-08-30 RX ADMIN — ERTAPENEM SODIUM 1 G: 1 INJECTION, POWDER, LYOPHILIZED, FOR SOLUTION INTRAMUSCULAR; INTRAVENOUS at 02:53

## 2019-08-30 RX ADMIN — APIXABAN 2.5 MG: 2.5 TABLET, FILM COATED ORAL at 21:57

## 2019-08-30 ASSESSMENT — ACTIVITIES OF DAILY LIVING (ADL)
ADLS_ACUITY_SCORE: 16

## 2019-08-30 ASSESSMENT — MIFFLIN-ST. JEOR: SCORE: 1377.72

## 2019-08-30 NOTE — PROGRESS NOTES
[facility], [facility] Progress Note - Hospitalist Service, Gold 3       Date of Admission:  8/26/2019  Assessment & Plan   Luz Thompson is a 69 year old female admitted on 8/26/2019. 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    #  Sepsis secondary to E coli bacteremia and pyelonephritis with DERREK  Urine and blood positive for E coli.  DERREK improving.  Appreciate involvement of Transplant ID to guide antibiotic therapy.  -  Continue ertapenem through 9/11 to complete 14 day course from the day of the first negative blood culture.  Follow this by 14 days of oral cefpodoxime 9/12 through 9/25  -  PICC line for outpatient antibiotic access (required by TCU, cannot accept patient with PIV or midline)    #  Mild hyperglycemia in the setting of DM II  Per patient request, contacted primary endocrinologist regarding glucose management.  Per Dr. Vasquez, recommend NPH 10 units qAM.    -  NPH 10 units qAM  -  Low SSI    #  History of a liver transplant in 2002  #  Elevated transaminases  -  Appreciate involvement of Hepatology  -  Continue home immunosuppression  -  EBV PCR pending  -  Follow LFTs, had been elevated on admission but now downtrending and US negative    #  A fib  -  Continue apixaban, metoprolol, losartan, ezetimibe  -  Receives alirocumab (Praluent) every 2 weeks as an outpatient, will hold while inpatient     #  Hypothyroid  -  Continue levothyroxine    #  Depression  -  Reinitiating sertraline, per outpatient plan will take 50mg at bedtime for 2 weeks and then increase to 100mg.  Keeping 50mg at bedtime for now.         Diet: Combination Diet Regular Diet Adult  Room Service    DVT Prophylaxis: Apixaban for a fib  Ron Catheter: not present  Code Status: Full Code      Disposition Plan   Expected  discharge: Tomorrow, recommended to transitional care unit once antibiotic plan established.  Entered: Aliya Pace MD 08/30/2019, 3:17 PM       The patient's care was discussed with the Bedside Nurse and Patient.    Aliya Pace MD  Hospitalist Service, [address], [place]  Pager: [phone]  Please see sticky note for cross cover information  ______________________________________________________________________    Interval History   Nursing notes reviewed, no acute events overnight.  Feeling constipated.  Frustrated about having hyperglycemia, would like opinion from primary endocrinologist.  No abdominal pain, nausea.  No fevers.    Data reviewed today: I reviewed all medications, new labs and imaging results over the last 24 hours. I personally reviewed no images or EKG's today.    Physical Exam   Vital Signs: Temp: 97.6  F (36.4  C) Temp src: Oral BP: (!) 168/65 Pulse: 61 Heart Rate: 56 Resp: 18 SpO2: 99 % O2 Device: None (Room air)    Weight: 188 lbs 14.4 oz  Constitutional: Sitting in bed in NAD  ENT: EOMI, MMM  Respiratory: CTAB  Cardiovascular: Irregularly irregular, peripheral pulses intact  GI: NABS, soft, NT, ND    Data   Recent Labs   Lab 08/30/19  0657 08/29/19  0544 08/28/19  0528  08/26/19  2331   WBC 6.0 7.3 7.2   < > 12.5*   HGB 9.2* 9.6* 9.1*   < > 10.5*   MCV 90 90 89   < > 86    255 225   < > 226   INR  --   --   --   --  1.33*    138 139   < > 135   POTASSIUM 4.4 4.5 4.3   < > 3.6   CHLORIDE 109 110* 108   < > 100   CO2 25 21 22   < > 26   BUN 37* 43* 43*   < > 41*   CR 1.53* 1.62* 1.85*   < > 2.38*   ANIONGAP 6 8 9   < > 9   KEILY 8.8 8.8 7.7*   < > 8.1*   * 155* 165*   < > 209*   ALBUMIN 2.3* 2.4* 2.0*   < > 2.4*   PROTTOTAL 6.4* 6.6* 6.2*   < > 7.0   BILITOTAL 0.2 0.2 0.2   < > 0.5   ALKPHOS 202* 218* 207*   < > 259*   ALT 72* 94* 112*   < > 165*   AST 20 41 69*   < > 133*   LIPASE  --   --   --   --  66*    < > = values  in this interval not displayed.

## 2019-08-30 NOTE — PLAN OF CARE
Pt a/o x 3. VSS on RA except hypertension 168/65, PRN hydralazine given. Tylenol and aqua K pad helping with flank pain. Pt accepted to a facility tomorrow and pt's daughter will be able to provide transportation. PICC line will need to be placed prior to tx to facility for 14 days of ertapenem. NPH insulin to start tomorrow. Will continue POC.

## 2019-08-30 NOTE — PROGRESS NOTES
[facility]  Transplant Infectious Disease Progress Note     Patient:  Luz Thompson, Date of birth 1950, Medical record number [id number]  Date of Visit:  08/30/2019          Assessment and Recommendations:   Recommendations:  - Continue ertapenem to 9/11/2019, which is 14-days from the day of the first negative BCx. Would prefer to use PIV for 14-day ertapenem course is possible, so that we don't damage her blood vessels. If needed, midline can be used for ertapenem.   - Planning to follow ertapenem with 14 days of oral cefpodoxime, 200 mg BID, 9/12/2019 to 9/25/2019.   - Continue voriconazole 200 mg BID, since she does not tolerate fluconazole (severe rash) as well as having GI difficulty with posaconazole. She is aware of the risk of skin cancer with prolonged vori use, and combined with the increased risk of skin cancer because she is a transplant patient, & she is very willing to accept this risk.  - Use clotrimazole as needed for prn rx of thrush.    Transplant Infectious Disease will continue to follow with you.    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 She gets recurrent gram negative sepsis. She has known sigmoid diverticulosis. She gets episodes of defecation syncope.   ID issues:  - 8/26/2019 Gram negative odalys sepsis with E coli. , ESR 78. Pptat'd by back pain, expect this is an intraabdominal source such as a kidney transplant-associated UTI. Continue ertapenem while awaiting negative BCx. Continue ertapenem to 9/11/2019, which is 14-days from the day of the first negative BCx. Would prefer to use PIV for 14-day ertapenem course is possible, so that we don't damage her blood vessels. If needed, midline can be used for ertapenem. Planning to follow ertapenem with 14 days of oral cefpodoxime, 200 mg BID, 9/12/2019 to 9/25/2019.   -  Coccidioides infection, diagnosis [id number] winter season. Followup CT imaging 6/27/2018 showed no worsening of the nodularities over a 1-yr interval. Follow-up Chest CT 5/21/2019 demonstrated unchanged 12 mm cavitary nodule in the lingula since 3/27/2018. However, this nodule had increased in size since 4/24/2017 when it measured 8 mm and was FDG avid on the PET/CT 8/8/2017, so she needs continued therapy. She had severe rash from fluconazole, and did not tolerate posaconazole very well (GI issues), so she would like to restart voriconazole. She does get dry skin and areas of frequent picking of skin with vori use. Prior auth for vori refills will be completed as needed. Vori had been restarted 8/9/2019, 8/29/2019 level good at 2.2.  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 She had some side effects in the days following the dose of Rituxan, but otherwise would be able to tolerate it again in the future. Checking EBV level annually.   - Left otitis. On ciprodex drops prn. Followed in ENT clinic due to perforation.   - Self-triggered use of prn antibiotics due to recurrent episodes of sepsis. She gets a lot of itching with vantin, and quinolones and macrolides don't seem to work that well for her, so her current prn med is bactrim. If the bactrim does not do the job in controlling fever, because she has allergies to penicillins and quinolones (cipro and levaquin), she knows to be seen somewhere where she could be evaluated for a once daily infusion of ertapenem (also called Invanz).   - Hx of E coli pyelonephritis in 3/2019, hospitalized twice in [place].   - Hx of bacterial superinfection of chronic venous stasis changes on the legs, 7/27/2016.  - Hx of recurrent E coli sepsis 8/13/2013, 6/10/2015.   - Hx of Klebsiella UTI, 7/18/16. She completed a 14-day course of macrobid.  - Hx of Haemophilus influenzae pneumonia in 9/2014.  - Hx of angular chelitis, hx of thrush extending from under her upper  denture plate, and onychomycosis.   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   - QTc interval 439 msec on 8/7/2019 EKG.   - PCP prophylaxis: Not needed, as most recent CD4 count was > 200.   - Serostatus: CMV seronegative, EBV seropositive on 8/15/13.  - Immunization status: Completed PCV13 and PPSV23 x2 with last dose in 5/2016.  - Gamma globulin status: Endogenously replete.  - Isolation status: Good hand hygience.     Crystal Montero MD. Pager [phone]         Interval History:   Since Virginia was last seen by ID on 8/29/2019, she is feeling better, working with PT to learn how to use a cane during ambulation. Had no pain overnight. Glucoses have been a bit high, as has been BP. She may discharge to TCU today or tomorrow.     Transplants:  5/22/2002 (Liver), Postoperative day:  6309         Review of Systems:   CONSTITUTIONAL:  No fever.  EYES: negative for icterus. Vision good with treatment of cataract and glaucoma issues.   ENT:  She wears hearing aids. She takes her dentures out at night. No sore throat.   RESPIRATORY:  No cough, no sputum, but sometimes she has dyspnea.   CARDIOVASCULAR:  negative for chest pain, no heart palpitations. Had a run of afib in 6/2019.  GASTROINTESTINAL:  negative for nausea or vomiting, but stools are soft  GENITOURINARY:  No dysuria or hematuria  HEME:  Normal amount of easy bruising, + easy bleeding from nosebleeds.   INTEGUMENT:  She has dry skin. No new rash.   NEURO:  No headache. No tremor.     Current medications:    apixaban ANTICOAGULANT  2.5 mg Oral BID     calcitRIOL  0.5 mcg Oral Daily     ertapenem (INVanz) IV  1 g Intravenous Q24H     estradiol  2 g Vaginal Once per day on Mon Thu     ezetimibe  10 mg Oral Daily     ferrous gluconate  324 mg Oral TID w/meals     fiber modular (NUTRISOURCE FIBER)  1 packet Oral or Feeding Tube Daily     folic acid  1 mg Oral Daily     insulin aspart  1-3 Units Subcutaneous TID AC     insulin aspart  1-3 Units Subcutaneous At Bedtime     levothyroxine   150 mcg Oral Every Other Day     levothyroxine  175 mcg Oral Every Other Day     losartan  25 mg Oral Daily     metoprolol succinate ER  50 mg Oral At Bedtime     omega-3 acid ethyl esters  1 g Oral BID     predniSONE  10 mg Oral Daily     sertraline  50 mg Oral At Bedtime     sirolimus  1 mg Oral Every Other Day     sodium chloride (PF)  3 mL Intracatheter Q8H     ursodiol  250 mg Oral BID     voriconazole  200 mg Oral BID       Allergies   Allergen Reactions     Fluconazole Hives and Itching     Azithromycin Itching     Benadryl [Diphenhydramine Hcl]      Insomnia      Cellcept Diarrhea     Ciprofloxacin Other (See Comments)     Insomnia, mood lability, Irregular heart beat, anxiety     Codeine      Psych disturbance     Diphenhydramine Other (See Comments)     Doxycycline      Lansoprazole Diarrhea     Levaquin [Levofloxacin] Other (See Comments)     Headache, hyperactivity     Lisinopril Cough     Methotrexate      Sores     Morphine Sulfate Itching     Mycophenolate Diarrhea     Simvastatin Muscle Pain (Myalgia)     severe     Cephalexin Rash     Fever and skin burning     Penicillin G Itching and Rash     Tolectin [Nsaids] Rash     Tramadol Rash            Physical Exam:   Vitals were reviewed.  All vitals stable.   Patient Vitals for the past 24 hrs:   BP Temp Temp src Pulse Resp SpO2   08/30/19 0915 (!) 157/63 -- -- -- -- 98 %   08/30/19 0839 (!) 166/67 -- -- -- -- 97 %   08/30/19 0623 (!) 182/78 98.1  F (36.7  C) Oral -- 16 96 %   08/29/19 2154 136/61 99.5  F (37.5  C) Oral 55 16 94 %   08/29/19 1954 (!) 164/53 -- -- -- -- --   08/29/19 1755 (!) 160/67 -- -- -- -- --   08/29/19 1628 (!) 171/61 -- -- -- -- --   08/29/19 1451 (!) 171/73 98.9  F (37.2  C) Oral -- 16 95 %   08/29/19 1112 (!) 146/66 -- -- -- -- --      Wt Readings from Last 4 Encounters:   08/28/19 85.7 kg (188 lb 14.4 oz)   08/22/19 84.8 kg (187 lb)   08/09/19 85.1 kg (187 lb 11.2 oz)   08/07/19 85.4 kg (188 lb 4.8 oz)       Physical  Examination:  GENERAL:  well-developed, well-nourished woman, in no acute distress.  HEENT:  Head is normocephalic, atraumatic   EYES:  Eyes have anicteric sclerae.   ENT: Has only 1 hearing aid in place, so have to speak loudly. Dentures. No thrush. Mouth is dry.  NECK:  Supple.   LUNGS: Clear to auscultation bilateral.   CARDIOVASCULAR: Regular rate and rhythm with 2/6 systolic murmur   ABDOMEN: Normal bowel sounds, not tender. Subcostal surgical scar not otherwise examined.  SKIN:  No acute rash. She is not wearing compression stockings. Onychomycosis not examined today.   NEUROLOGIC:  Grossly nonfocal. Active x4 extremities         Laboratory Data:     Absolute CD4   Date Value Ref Range Status   08/19/2014 415 mm3 Final   09/16/2013 1,266 mm3 Final   09/15/2013 DUPLICATE,TESTING DONE ON SPECIMEN FROM 9.16.13 mm3 Final   11/13/2008 1332 mm3 Final       Inflammatory Markers    Recent Labs   Lab Test 08/26/19  2331 05/20/19  0957 07/30/18  0855 09/03/17  0912 09/02/17  0648 09/01/17  0832 05/14/16  0659 05/13/16  0940 09/23/14  0810 08/19/14  1530  06/30/14  0825 05/15/14  1112   SED 78* 47* 73*  --   --   --   --  67* 79* 76*  --  51* 58*   .0* <2.9 5.2 64.0* 71.0* 51.0* 21.0* 19.0* 6.1 29.8*   < > 12.5* 8.0    < > = values in this interval not displayed.       Immune Globulin Studies    Recent Labs   Lab Test 08/05/19  1552 08/19/14  1530 05/21/12  0754   IGG 1,110 1,220 1070     --  97   IGE  --  46  --      --  85   IGG1  --  684  --    IGG2  --  441  --    IGG3  --  70  --    IGG4  --  19  --        Metabolic Studies       Recent Labs   Lab Test 08/30/19  0657 08/29/19  0544 08/28/19  0528 08/27/19  0532 08/27/19  0403 08/26/19  2331 08/05/19  1552  10/16/18  0905  04/17/18  0942  05/10/17  0929    138 139 142  --  135 139   < >  --    < > 138   < > 138   POTASSIUM 4.4 4.5 4.3 3.6  --  3.6 2.9*   < >  --    < > 3.9   < > 4.2   CHLORIDE 109 110* 108 109  --  100 99   < >  --    < >  102   < > 102   CO2 25 21 22 22  --  26 30   < >  --    < > 28   < > 26   ANIONGAP 6 8 9 10  --  9 10   < >  --    < > 8   < > 10   BUN 37* 43* 43* 40*  --  41* 36*   < >  --    < > 36*   < > 36*   CR 1.53* 1.62* 1.85* 2.15*  --  2.38* 1.41*   < >  --    < > 1.74*   < > 1.53*   GFRESTIMATED 34* 32* 27* 23*  --  20* 38*   < >  --    < > 29*   < > 34*   * 155* 165* 134*  --  209* 269*   < >  --    < > 125*   < > 104*   A1C  --   --   --   --   --   --   --   --  6.4*   < >  --   --   --    KEILY 8.8 8.8 7.7* 7.0*  --  8.1* 8.5   < >  --    < > 9.1   < > 9.7   PHOS  --   --   --   --   --   --  3.3  --   --   --   --   --  3.1   MAG  --   --   --   --   --   --  2.2  --   --   --  2.8*   < > 2.8*   LACT  --   --   --   --  0.5* 1.8  --   --   --   --   --   --   --     < > = values in this interval not displayed.       Hepatic Studies    Recent Labs   Lab Test 08/30/19  0657 08/29/19  0544 08/28/19  0528 08/27/19  0532 08/26/19  2331 08/05/19  1552   BILITOTAL 0.2 0.2 0.2 0.3 0.5 0.3   ALKPHOS 202* 218* 207* 198* 259* 213*   ALBUMIN 2.3* 2.4* 2.0* 1.8* 2.4* 3.0*   AST 20 41 69* 87* 133* 25   ALT 72* 94* 112* 120* 165* 40       Lipid testing    Recent Labs   Lab Test 05/20/19  0957 12/28/18 10/16/18  0902  09/18/15  0954   CHOL 185 225* 300*   < > 181   HDL 65 47 52   < > 63   LDL 91 110 194*   < > 84   TRIG 146 339* 269*   < > 172*   CHOLHDLRATIO  --  4.8*  --   --  2.9    < > = values in this interval not displayed.       Gout Labs      Recent Labs   Lab Test 08/05/19  1552 12/31/13  1443 07/30/13  1441   URIC 5.6 6.7 6.7       Hematology Studies      Recent Labs   Lab Test 08/30/19  0657 08/29/19  0544 08/28/19  0528 08/27/19  0532 08/26/19  2331 08/05/19  1552  05/18/18  0731  09/02/17  0648 09/01/17  0832 08/31/17  1306  05/16/16  0827   WBC 6.0 7.3 7.2 9.2 12.5* 9.1   < > 8.0   < > 10.2 9.4 10.6   < > 5.7   ANEU  --   --   --   --  10.8*  --   --  5.0  --  7.4 6.5 8.6*  --  2.5   ALYM  --   --   --   --  0.8   --   --  1.9  --  1.7 1.7 1.3  --  2.4   KATHY  --   --   --   --  0.7  --   --  1.0  --  0.9 1.0 0.7  --  0.6   AEOS  --   --   --   --  0.0  --   --  0.1  --  0.2 0.1 0.0  --  0.1   HGB 9.2* 9.6* 9.1* 8.4* 10.5* 11.5*   < > 11.6*   < > 10.5* 11.1* 12.4   < > 11.1*   HCT 30.8* 32.8* 30.2* 28.9* 33.0* 35.7   < > 36.0   < > 34.0* 35.0 38.5   < > 35.4    255 225 191 226 367   < > 324   < > 259 269 290   < > 256    < > = values in this interval not displayed.       Iron Testing    Recent Labs   Lab Test 08/30/19  0657  08/05/19  1552  07/30/18  0855  07/11/13  0814  12/22/12  1346  12/20/12  2005 12/07/12  1345   IRON  --   --  56  --  39  --  58   < >  --   --   --  48   FEB  --   --  267  --  307  --  285   < >  --   --   --  156*   IRONSAT  --   --  21  --  13*  --  20   < >  --   --   --  30   LAURA  --   --  118  --  18   < > 195   < >  --   --   --  317*   MCV 90   < > 88   < >  --    < > 88   < > 100   < > 103*  --    B12  --   --   --   --   --   --   --   --   --   --   --  281   [id number]  --   --   --   --   --   --   --   --   --   --   --  272*   HAPT  --   --   --   --   --   --   --   --   --   --  137  --    RETP  --   --   --   --   --   --   --   --  2.7*  --   --   --    RETICABSCT  --   --   --   --   --   --   --   --  71.0  --   --   --     < > = values in this interval not displayed.       Clotting Studies    Recent Labs   Lab Test 08/26/19  2331 05/18/18  0731 05/16/16  0827 05/13/16  0940  06/19/11  1815   INR 1.33* 1.06 1.02 1.11   < > 1.08   PTT 38* 33  --  33  --  28    < > = values in this interval not displayed.       Thyroid Studies     Recent Labs   Lab Test 05/20/19  0957 10/16/18  0902 07/30/18  0855 07/13/17  0843 05/10/17  0929  09/23/14  0810   TSH 12.11* 18.39* 8.64* 3.66 4.74*   < > 2.87   T4 0.99 0.85 1.02 1.59* 1.48*   < > 1.29  9.1   T3  --   --  44*  --   --   --  65    < > = values in this interval not displayed.       Urine Studies     Recent Labs   Lab Test 08/27/19  0224  08/05/19  1552 05/22/19  1212 10/16/18  0925 07/30/18  0852  08/31/17  2100   URINEPH 6.5 5.5 5.5 6.0 6.0   < > 8.0*   NITRITE Negative Negative Neg Negative Negative   < > Negative   LEUKEST Moderate* Negative Small Small* Moderate*   < > Small*   WBCU 12* 0 - 5  --  0 - 5 5-10*  --  2    < > = values in this interval not displayed.       Medication levels    Recent Labs   Lab Test 08/29/19  0544  10/23/17  1025  09/15/13  0435  12/21/12  1344   VANCOMYCIN  --   --   --   --  26.3  --   --    VCON 2.2   < >  --   --   --   --   --    CYCLSP  --   --  Canceled, Test credited   < >  --   --   --    RAPAMY 9.8   < >  --    < >  --    < >  --    MPACID  --   --   --   --   --   --  5.42*   MPAG  --   --   --   --   --   --  83.5    < > = values in this interval not displayed.       Microbiology:    [facility]:        Last Culture results with specimen source  Culture Micro   Date Value Ref Range Status   08/30/2019 PENDING  Preliminary   08/29/2019 No growth after 19 hours  Preliminary   08/28/2019 No growth after 2 days  Preliminary   08/28/2019 No growth after 2 days  Preliminary   08/27/2019 10,000 to 50,000 colonies/mL  Escherichia coli   (A)  Final   08/27/2019 (A)  Final    Cultured on the 1st day of incubation:  Escherichia coli  Susceptibility testing done on previous specimen     08/27/2019   Final    Critical Value/Significant Value, preliminary result only, called to and read back by   Maria Teresa Martines RN 08/27/2019 @1425 dk/mamie     08/26/2019 (A)  Final    Cultured on the 1st day of incubation:  Escherichia coli     08/26/2019   Final    Critical Value/Significant Value, preliminary result only, called to and read back by  NENO RobertUER at 1229 8.27.19.DK     08/26/2019   Final    (Note)  POSITIVE for E.COLI by ROXIMITYigene multiplex nucleic acid test. Final  identification and antimicrobial susceptibility testing will be  verified by standard methods. Verigene test will not distinguish  E.coli from  Shigella species including S.dysenteriae, S.flexneri,  S.boydii, and S.sonnei. Specimens containing Shigella species or  E.coli will be reported as Positive for E.coli.    Specimen tested with [employer] multiplex, gram-negative blood culture  nucleic acid test for the following targets: Acinetobacter sp.,  Citrobacter sp., Enterobacter sp., Proteus sp., E. coli, K.  pneumoniae/oxytoca, P. aeruginosa, and the following resistance  markers: CTXM, KPC, NDM, VIM, IMP and OXA.    Critical Value/Significant Value called to and read back by  Flower Lujan Rn @ 1449 8.27.19        09/27/2018 Moderate growth  Staphylococcus aureus   (A)  Final   07/30/2018   Final    50,000 to 100,000 colonies/mL  mixed urogenital louann  Susceptibility testing not routinely done     07/18/2016 (A)  Final    50,000 to 100,000 colonies/mL Klebsiella pneumoniae   05/21/2016 No growth  Final   05/21/2016 No growth  Final   05/16/2016 Canceled, Test credited Duplicate request  Final   05/16/2016 Canceled, Test credited Duplicate request  Final   05/16/2016 No growth  Final   05/16/2016 No growth  Final    Specimen Description   Date Value Ref Range Status   08/30/2019 Blood Left Arm  Final   08/29/2019 Blood Left Arm  Final   08/28/2019 Blood Left Hand  Final   08/28/2019 Blood Right Hand  Final   08/27/2019 Midstream Urine  Final   08/27/2019 Blood Right Arm  Final   08/26/2019 Blood Right Arm  Final   09/27/2018 Right Hand Wound  Final   07/30/2018 Midstream Urine  Final   07/18/2016 Midstream Urine  Final   05/21/2016 Blood Right Arm  Final   05/21/2016 Blood Left Arm  Final   05/16/2016 Blood  Final   05/16/2016 Blood  Final   05/16/2016 Blood Left Arm  Final   05/16/2016 Blood Right Arm  Final   05/13/2016 Blood Unspecified Site  Final   05/13/2016 Blood Unspecified Site  Final   05/13/2016 Blood Right Arm  Final   05/13/2016 Blood Right Arm  Final        Last check of C difficile  C Diff Toxin B PCR   Date Value Ref Range Status   05/17/2012    Final    Negative: Clostridium difficile target DNA sequences NOT detected, presumed   negative for Clostridium difficile toxin B or the number of bacteria present   may be below the limit of detection for the test.   FDA approved assay performed using Ry[employer] real-time PCR.   A negative result does not exclude actual disease due to Clostridium difficile   and may be due to improper collection, handling and storage of the specimen or   the number of organisms in the specimen is below the detection limit of the   assay.       Virology:  CMV Quantitative   Date Value Ref Range Status   08/19/2014 <100 <100 Copies/mL Final   08/15/2013 <100 <100 Copies/mL Final   11/11/2008 <100 <100 Copies/mL Final     Comment:     No CMV DNA detected.   08/21/2007 <100 <100 Copies/mL Final     Comment:     No CMV DNA detected.   01/10/2007 <100 <100 Copies/mL Final     Comment:     No CMV DNA detected.       EBV DNA Copies/mL   Date Value Ref Range Status   07/30/2018 2,166 (A) EBVNEG^EBV DNA Not Detected [Copies]/mL Final   08/22/2017 EBV DNA Not Detected EBVNEG^EBV DNA Not Detected [Copies]/mL Final   04/11/2017 (A) EBVNEG [Copies]/mL Final    <500  EBV DNA Detected below the reportable range of 500 Copies/mL     12/09/2016 1,219 (A) EBVNEG [Copies]/mL Final   08/08/2016 29,569 (A) EBVNEG [Copies]/mL Final   07/26/2016 11,522 (A) EBVNEG [Copies]/mL Final   05/24/2016 24,676 (A) EBVNEG [Copies]/mL Final   05/13/2016 19,224 (A) EBVNEG [Copies]/mL Final   11/20/2015 25,676 (A) EBVNEG [Copies]/mL Final   09/14/2015 48,332 (A) EBVNEG [Copies]/mL Final   07/20/2015 48,923 (A) EBVNEG [Copies]/mL Final   09/15/2013 <1000 <1000 Copies/mL Final   08/15/2013 <1000 <1000 Copies/mL Final   11/12/2008 <1000 <1000 Copies/mL Final       BK viral loads   Recent Labs   Lab Test 07/28/11  1150   BKSPEC Urine   BKRES <1000       Imaging   CT CHEST W/O CONTRAST 6/27/2018 3:15 PM  Comparison: CT chest 3/27/2018, 7/31/2017, 4/24/2017; PET CT  8/8/2017   Findings: Chest: Heart size within normal limits. No pericardial effusion. The  thoracic aorta and pulmonary artery are normal caliber. No enlarged  thoracic lymph nodes by CT short axis size criteria.  Central airways are patent. No pleural effusion or pneumothorax.  Innumerable calcified granulomas, mostly clustered in the right lung  base, nicely change from the prior CT. There is a cavitary 12 mm  nodule in the lingula, which previously measured 1.2 cm on 3/27/2018  (however this measured 8 mm on 4/24/2017). 4 mm pulmonary nodule in  the left lower lobe (series 6 image 155) which is unchanged from 4/24/2017.  Upper abdomen: Limited evaluation of the upper abdomen. Unchanged  pneumobilia. Postsurgical changes of liver transplant. Atrophic  kidneys. Adrenal glands are normal.        Impression:   1. Unchanged 12 mm cavitary nodule in the lingula since 3/27/2018.  However, this nodule is increased in size since 4/24/2017 when it  measured 8 mm and was FDG avid on the PET/CT 8/8/2017. Recommend  continued close follow-up (3-6 months) and/or soft tissue biopsy.  2. Extensive granulomatous disease mostly clustered in the right lower lobe.     No results found for this or any previous visit (from the past 24 hour(s)).

## 2019-08-30 NOTE — PROGRESS NOTES
HISTORY OF PRESENT ILLNESS  Ms. 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     Prophylactic antibiotic     High risk medication use     Statin intolerance     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     Sensorineural hearing loss (SNHL) of both ears     Abnormal liver function tests     Liver replaced by transplant (H)     Type 2 diabetes, HbA1c goal < 7% (H)     Hyperlipidemia LDL goal <70     Hypertension goal BP (blood pressure) < 140/80     Postoperative hypothyroidism     Type 2 diabetes mellitus with stage 3 chronic kidney disease, without long-term current use of insulin (H)     Age-related osteoporosis without current pathological fracture     Tympanic membrane perforation, left     Other infective chronic otitis externa of left ear     CKD (chronic kidney disease) stage 3, GFR 30-59 ml/min (H)     Pain of right thumb     UTI (urinary tract infection)     Gram negative sepsis (H)     Escherichia coli sepsis (H)       No current outpatient medications on file.       Allergies   Allergen Reactions     Fluconazole Hives and Itching     Azithromycin Itching     Benadryl [Diphenhydramine Hcl]      Insomnia      Cellcept Diarrhea     Ciprofloxacin Other (See Comments)     Insomnia, mood lability, Irregular heart beat, anxiety     Codeine      Psych disturbance     Diphenhydramine Other (See Comments)     Doxycycline      Lansoprazole Diarrhea     Levaquin [Levofloxacin] Other (See Comments)     Headache, hyperactivity     Lisinopril Cough      "Methotrexate      Sores     Morphine Sulfate Itching     Mycophenolate Diarrhea     Simvastatin Muscle Pain (Myalgia)     severe     Cephalexin Rash     Fever and skin burning     Penicillin G Itching and Rash     Tolectin [Nsaids] Rash     Tramadol Rash       Family History   Problem Relation Age of Onset     Hypertension Mother      Endometrial Cancer Mother      Hyperlipidemia Mother      Prostate Cancer Father      Macular Degeneration Father      Cancer - colorectal Maternal Grandmother         in her 80's, has surgery and removal of part of kidney,  at age 98     Heart Disease Maternal Grandfather          at 98     Glaucoma Maternal Grandfather      Cerebrovascular Disease Paternal Grandmother         in her 80's     Hypertension Paternal Grandmother      Heart Disease Paternal Grandfather         MI     Alzheimer Disease Paternal Grandfather      Allergies Son      Neurologic Disorder Daughter         Migraines     Breast Cancer Other      Anesthesia Reaction No family hx of      Crohn's Disease No family hx of      Ulcerative Colitis No family hx of        Additional medical/Social/Surgical histories reviewed in [facility] and updated as appropriate.     REVIEW OF SYSTEMS (2019)  10 point ROS of systems including Constitutional, Eyes, Respiratory, Cardiovascular, Gastroenterology, Genitourinary, Integumentary, Musculoskeletal, Psychiatric were all negative except for pertinent positives noted in my HPI.     PHYSICAL EXAM  Vitals:    19 1521   Weight: 83 kg (183 lb)   Height: 1.702 m (5' 7\")     Vital Signs: Ht 1.702 m (5' 7\")   Wt 83 kg (183 lb)   LMP 1988 (Approximate)   BMI 28.66 kg/m   Patient declined being weighed. Body mass index is 28.66 kg/m .    General  - normal appearance, in no obvious distress  CV  - normal popliteal pulse  Pulm  - normal respiratory pattern, non-labored  Musculoskeletal - bilateral knee  - stance: non antalgic gait, genu varum  - inspection: no " generalized swelling, trace effusion  - palpation: medial joint line tenderness bilaterally, patella and patellar tendon non-tender, normal popliteal pulse  - ROM: 100 degrees flexion, 0 degrees extension, slightly painful active ROM  - strength: 5/5 in flexion, 5/5 in extension  - neuro: no sensory or motor deficit  - special tests:  (-) Lachman  (-) anterior drawer  (-) posterior drawer  (-) pivot shift  (-) varus at 0 and 30 degrees flexion  (-) valgus at 0 and 30 degrees flexion  (+) Harlan s compression test  (-) patellar apprehension  Neuro  - no sensory or motor deficit, grossly normal coordination, normal muscle tone  Skin  - no ecchymosis, erythema, warmth, or induration, no obvious rash  Psych  - interactive, appropriate, normal mood and affect  Right hand: has ttp over cmc joint and painful gripping  ASSESSMENT & PLAN  70 yo female with right cmc arthritis, bilateral knee osteoarthritis  xrays knees: shows djd  xrays hand shows cmc arthritis  Consider injection in 2 weeks  Cont use of brace  Consider knee injections  Consider imaging of lumbar spine  Start prednisone  PT for knees    Samson Griffith MD, CAQSM

## 2019-08-30 NOTE — PROGRESS NOTES
Social Work Services Progress Note    Hospital Day: 5  Date of Initial Social Work Evaluation:  8/29/19  Collaborated with:  Primary team Gold 3, TCU facilities    Data:  Pt is a 69-year-old female admitted 8/26/19 with sepsis secondary to pyelonephritis and bacteremia. TCU is recommended at discharge.    Intervention:  Following up on TCU referrals. Pt may be stable for discharge today or tomorrow. Paged  TCU liaison with update. Per Evelyne in admissions,  TCU has no beds today and might not have any available this wknd- they also feel pt likely does not require TCU and could d/c home.     Spoke with Hina in admissions at [facility] ( [phone]) who will assess referral.     Assessment:  Pursuing TCU placement    Plan:    Anticipated Disposition:  Facility:  TCU    Barriers to d/c plan:  Medical stability    Follow Up:  SW to follow for discharge planning    HUMA Foster, [facility]  5th Floor   Pager [phone]  Phone [phone]    Addendum 1:48pm: [facility] $14/day room fee. Pt needs PICC line prior to discharge and should be ready for d/c tomorrow. Updated pt. Pt agreeable with plan. Pt states her daughter should be able to transport her at d/c otherwise she has friends who can.     Weekend contact numbers for [facility]:  [phone], fax [phone]. PAS completed: [id number].

## 2019-08-30 NOTE — PLAN OF CARE
Denied pain during the night. Up to the bathroom independently. See flow sheet for UOP. Blood glucose at 0200 was 156. Systolic blood pressure 182 this morning, prn hydralazine given. Blood pressure recheck at 0730 was 166/78. Right PIV is saline locked, Invanz given during the night. Patient may discharge to TCU today or tomorrow. Continue with current plan of nursing care, and update MD with concerns as needed.

## 2019-08-30 NOTE — PLAN OF CARE
Temp: 99.5  F (37.5  C) Temp src: Oral BP: 136/61 Pulse: 55 Heart Rate: 55 Resp: 16 SpO2: 94 % O2 Device: None (Room air)    Pain: C/O Backpain - Tylenol po x 1 at HS for comfort     Neuro: Alert & Oriented x 4 - awake most of the evening, watching TV.     Respiratory: Lungs clear, no coughing noted.     Cardiac/Neurovascular: HTN - PRN Hydralazine 25mg po x 1.     GI/: BS +, no BM     Nutrition: Regular Diet , tolerated well.   & 201, covered with Insulin as ordered.     Activity: Up to bathroom with SBA, uses call light as appropriate     Skin:  Skin intact - no issues     Lines: R PIV infiltrated, new one placed in Right arm.     Plan: DC to TCU either Friday or Saturday once bed is established.  Will continue to monitor per POC and notify MD of any acute changes

## 2019-08-30 NOTE — PLAN OF CARE
Discharge Planner PT 5B  Patient plan for discharge: TCU  Current status: Pt completed bed mobility IND. STS with SBA-CGA due to difficulty gaining balance upon standing. Ambulated 2x125 ft with SEC on L and close CGA. Unsteady gait but pt declines using FWW. Completed seated LE exercises, x5 STS, and standing balance activities. VSS on RA.   Barriers to return to prior living situation: weakness, balance, activity tolerance  Recommendations for discharge: TCU  Rationale for recommendations: Pt below baseline and not safe to return to previous living situation at this time. Will benefit from continued therapy to improve balance and safety with functional mobility.       Entered by: Karlene Estevez 08/30/2019 9:44 AM

## 2019-08-30 NOTE — PLAN OF CARE
Discharge Planner OT   Patient plan for discharge: TCU  Current status: pt amb in rm with no AE IND upon entering, edu provided regarding balance deficits with pt verbalizing understanding, SPC provided with CGA for rm mobility, demo/edu provided on safe shower/tub transfer to maximize safety/GABY, CGA with use of grab bars  and SPC for shower transfer, pt unsteady on feet and unsure of steps throughout amb and transfers, min A for UB/LB bathing, mod I for dressing, pt denying cognitive/[facility] screening this date  Barriers to return to prior living situation: balance, lack of insight to deficits, weakness  Recommendations for discharge: TCU  Rationale for recommendations: to progress IND with ADLs and functional mobility. Pt continues to be resistant to AE although acknowledges poor balance. WIll benefit from continued skilled therapy to address deficits. Daughter reports concern with pt cognition and safety awareness and would like cog screening at TCU.        Entered by: Rosy Thompson 08/30/2019 4:08 PM

## 2019-08-31 ENCOUNTER — MYC MEDICAL ADVICE (OUTPATIENT)
Dept: ENDOCRINOLOGY | Facility: CLINIC | Age: 69
End: 2019-08-31

## 2019-08-31 ENCOUNTER — APPOINTMENT (OUTPATIENT)
Dept: GENERAL RADIOLOGY | Facility: CLINIC | Age: 69
DRG: 872 | End: 2019-08-31
Attending: INTERNAL MEDICINE
Payer: MEDICARE

## 2019-08-31 ENCOUNTER — MEDICAL CORRESPONDENCE (OUTPATIENT)
Dept: HEALTH INFORMATION MANAGEMENT | Facility: CLINIC | Age: 69
End: 2019-08-31

## 2019-08-31 VITALS
DIASTOLIC BLOOD PRESSURE: 66 MMHG | RESPIRATION RATE: 18 BRPM | WEIGHT: 188.9 LBS | HEART RATE: 72 BPM | TEMPERATURE: 98.9 F | OXYGEN SATURATION: 92 % | HEIGHT: 65 IN | BODY MASS INDEX: 31.47 KG/M2 | SYSTOLIC BLOOD PRESSURE: 173 MMHG

## 2019-08-31 DIAGNOSIS — E11.22 TYPE 2 DIABETES MELLITUS WITH STAGE 3 CHRONIC KIDNEY DISEASE, WITHOUT LONG-TERM CURRENT USE OF INSULIN (H): Primary | ICD-10-CM

## 2019-08-31 DIAGNOSIS — N18.30 TYPE 2 DIABETES MELLITUS WITH STAGE 3 CHRONIC KIDNEY DISEASE, WITHOUT LONG-TERM CURRENT USE OF INSULIN (H): Primary | ICD-10-CM

## 2019-08-31 LAB
GLUCOSE BLDC GLUCOMTR-MCNC: 122 MG/DL (ref 70–99)
GLUCOSE BLDC GLUCOMTR-MCNC: 145 MG/DL (ref 70–99)

## 2019-08-31 PROCEDURE — 25000128 H RX IP 250 OP 636: Performed by: INTERNAL MEDICINE

## 2019-08-31 PROCEDURE — 25000132 ZZH RX MED GY IP 250 OP 250 PS 637: Mod: GY | Performed by: STUDENT IN AN ORGANIZED HEALTH CARE EDUCATION/TRAINING PROGRAM

## 2019-08-31 PROCEDURE — 99239 HOSP IP/OBS DSCHRG MGMT >30: CPT | Performed by: INTERNAL MEDICINE

## 2019-08-31 PROCEDURE — 71045 X-RAY EXAM CHEST 1 VIEW: CPT

## 2019-08-31 PROCEDURE — 25000125 ZZHC RX 250: Performed by: INTERNAL MEDICINE

## 2019-08-31 PROCEDURE — 25000131 ZZH RX MED GY IP 250 OP 636 PS 637: Mod: GY | Performed by: INTERNAL MEDICINE

## 2019-08-31 PROCEDURE — 00000146 ZZHCL STATISTIC GLUCOSE BY METER IP

## 2019-08-31 PROCEDURE — 36569 INSJ PICC 5 YR+ W/O IMAGING: CPT

## 2019-08-31 PROCEDURE — 27211417 ZZ H KIT, VPS RHYTHM STYLET

## 2019-08-31 PROCEDURE — 25000131 ZZH RX MED GY IP 250 OP 636 PS 637: Mod: GY | Performed by: STUDENT IN AN ORGANIZED HEALTH CARE EDUCATION/TRAINING PROGRAM

## 2019-08-31 PROCEDURE — 27211388 ZZ H KIT, 4 FR SL BIOFLO OPEN ENDED PICC

## 2019-08-31 PROCEDURE — 25000132 ZZH RX MED GY IP 250 OP 250 PS 637: Mod: GY | Performed by: INTERNAL MEDICINE

## 2019-08-31 PROCEDURE — C1751 CATH, INF, PER/CENT/MIDLINE: HCPCS

## 2019-08-31 PROCEDURE — 40000802 ZZH SITE CHECK

## 2019-08-31 RX ORDER — ANTIOX #8/OM3/DHA/EPA/LUT/ZEAX 250-2.5 MG
1 CAPSULE ORAL 2 TIMES DAILY
DISCHARGE
Start: 2019-08-31

## 2019-08-31 RX ORDER — ACETAMINOPHEN 325 MG/1
650 TABLET ORAL EVERY 6 HOURS PRN
DISCHARGE
Start: 2019-08-31 | End: 2024-09-22

## 2019-08-31 RX ORDER — SIROLIMUS 1 MG
1 TABLET ORAL EVERY OTHER DAY
Qty: 45 TABLET | Refills: 3 | DISCHARGE
Start: 2019-08-31 | End: 2019-10-28

## 2019-08-31 RX ORDER — LEVOTHYROXINE SODIUM 150 UG/1
150 TABLET ORAL EVERY OTHER DAY
DISCHARGE
Start: 2019-09-01 | End: 2020-04-24

## 2019-08-31 RX ORDER — APIXABAN 2.5 MG/1
2.5 TABLET, FILM COATED ORAL 2 TIMES DAILY
Qty: 180 TABLET | Refills: 3 | DISCHARGE
Start: 2019-08-31 | End: 2019-11-12

## 2019-08-31 RX ORDER — URSODIOL 250 MG/1
250 TABLET, FILM COATED ORAL 2 TIMES DAILY
DISCHARGE
Start: 2019-08-31 | End: 2022-05-24

## 2019-08-31 RX ORDER — FUROSEMIDE 20 MG
20 TABLET ORAL DAILY
Qty: 90 TABLET | Refills: 3 | DISCHARGE
Start: 2019-08-31

## 2019-08-31 RX ORDER — CIPROFLOXACIN AND DEXAMETHASONE 3; 1 MG/ML; MG/ML
4 SUSPENSION/ DROPS AURICULAR (OTIC)
Qty: 5.6 ML | Refills: 6 | DISCHARGE
Start: 2019-09-02 | End: 2022-05-24

## 2019-08-31 RX ORDER — LOSARTAN POTASSIUM 25 MG/1
25 TABLET ORAL DAILY
DISCHARGE
Start: 2019-08-31 | End: 2020-08-12

## 2019-08-31 RX ORDER — ERTAPENEM 1 G/1
1 INJECTION, POWDER, LYOPHILIZED, FOR SOLUTION INTRAMUSCULAR; INTRAVENOUS EVERY 24 HOURS
DISCHARGE
Start: 2019-09-01 | End: 2019-09-20

## 2019-08-31 RX ORDER — METRONIDAZOLE 7.5 MG/G
GEL TOPICAL 2 TIMES DAILY
Qty: 45 G | Refills: 3 | DISCHARGE
Start: 2019-08-31 | End: 2023-06-15

## 2019-08-31 RX ORDER — PREDNISONE 10 MG/1
10 TABLET ORAL DAILY
DISCHARGE
Start: 2019-09-01 | End: 2020-05-29

## 2019-08-31 RX ORDER — LEVOTHYROXINE SODIUM 175 UG/1
175 TABLET ORAL EVERY OTHER DAY
DISCHARGE
Start: 2019-09-02 | End: 2020-04-24

## 2019-08-31 RX ORDER — FERROUS GLUCONATE 324(38)MG
324 TABLET ORAL
Qty: 90 TABLET | Refills: 0 | DISCHARGE
Start: 2019-08-31 | End: 2024-09-22

## 2019-08-31 RX ORDER — CLOTRIMAZOLE 1 %
CREAM (GRAM) TOPICAL 2 TIMES DAILY PRN
DISCHARGE
Start: 2019-08-31 | End: 2023-06-15

## 2019-08-31 RX ORDER — OMEGA-3-ACID ETHYL ESTERS 1 G/1
1 CAPSULE, LIQUID FILLED ORAL 2 TIMES DAILY
Qty: 180 CAPSULE | Refills: 3 | DISCHARGE
Start: 2019-08-31 | End: 2020-08-20

## 2019-08-31 RX ORDER — ESTRADIOL 0.1 MG/G
2 CREAM VAGINAL
Status: ON HOLD | DISCHARGE
Start: 2019-09-02 | End: 2024-09-22

## 2019-08-31 RX ORDER — GUAR GUM
1 PACKET (EA) ORAL DAILY
DISCHARGE
Start: 2019-09-01 | End: 2023-06-15

## 2019-08-31 RX ORDER — METOPROLOL SUCCINATE 50 MG/1
50 TABLET, EXTENDED RELEASE ORAL DAILY
Qty: 90 TABLET | Refills: 3 | DISCHARGE
Start: 2019-08-31 | End: 2019-10-10

## 2019-08-31 RX ORDER — WHEAT DEXTRIN 3 G/3.8 G
POWDER (GRAM) ORAL
DISCHARGE
Start: 2019-08-31 | End: 2019-09-20

## 2019-08-31 RX ORDER — HYDRALAZINE HYDROCHLORIDE 25 MG/1
25 TABLET, FILM COATED ORAL 3 TIMES DAILY PRN
DISCHARGE
Start: 2019-08-31 | End: 2022-05-24

## 2019-08-31 RX ORDER — FOLIC ACID 1 MG/1
1 TABLET ORAL DAILY
Qty: 100 TABLET | Refills: 3 | DISCHARGE
Start: 2019-08-31 | End: 2019-12-23

## 2019-08-31 RX ORDER — SUMATRIPTAN 50 MG/1
50 TABLET, FILM COATED ORAL
Qty: 30 TABLET | Refills: 3 | Status: ON HOLD | DISCHARGE
Start: 2019-08-31 | End: 2024-09-22

## 2019-08-31 RX ORDER — MECLIZINE HYDROCHLORIDE 25 MG/1
25 TABLET ORAL PRN
Qty: 30 TABLET | Refills: 11 | DISCHARGE
Start: 2019-08-31

## 2019-08-31 RX ORDER — LIDOCAINE 40 MG/G
CREAM TOPICAL
Status: DISCONTINUED | OUTPATIENT
Start: 2019-08-31 | End: 2019-08-31 | Stop reason: HOSPADM

## 2019-08-31 RX ORDER — CEFPODOXIME PROXETIL 200 MG/1
200 TABLET, FILM COATED ORAL 2 TIMES DAILY
DISCHARGE
Start: 2019-09-12 | End: 2019-09-24

## 2019-08-31 RX ORDER — NICOTINE POLACRILEX 4 MG
15-30 LOZENGE BUCCAL
DISCHARGE
Start: 2019-08-31 | End: 2023-06-15

## 2019-08-31 RX ORDER — HEPARIN SODIUM,PORCINE 10 UNIT/ML
2-5 VIAL (ML) INTRAVENOUS
Status: DISCONTINUED | OUTPATIENT
Start: 2019-08-31 | End: 2019-08-31 | Stop reason: HOSPADM

## 2019-08-31 RX ORDER — SENNOSIDES 8.6 MG
2 TABLET ORAL 2 TIMES DAILY PRN
DISCHARGE
Start: 2019-08-31 | End: 2023-06-15

## 2019-08-31 RX ORDER — EZETIMIBE 10 MG/1
10 TABLET ORAL DAILY
Qty: 90 TABLET | Refills: 3 | DISCHARGE
Start: 2019-08-31 | End: 2020-09-12

## 2019-08-31 RX ORDER — CALCITRIOL 0.5 UG/1
0.5 CAPSULE, LIQUID FILLED ORAL DAILY
Qty: 90 CAPSULE | Refills: 3 | DISCHARGE
Start: 2019-08-31 | End: 2020-02-13

## 2019-08-31 RX ORDER — VORICONAZOLE 200 MG/1
200 TABLET, FILM COATED ORAL 2 TIMES DAILY
Qty: 180 TABLET | Refills: 0 | DISCHARGE
Start: 2019-08-31 | End: 2019-12-23

## 2019-08-31 RX ADMIN — INSULIN HUMAN 10 UNITS: 100 INJECTION, SUSPENSION SUBCUTANEOUS at 08:32

## 2019-08-31 RX ADMIN — VORICONAZOLE 200 MG: 200 TABLET, FILM COATED ORAL at 08:27

## 2019-08-31 RX ADMIN — LIDOCAINE HYDROCHLORIDE 3.5 ML: 10 INJECTION, SOLUTION EPIDURAL; INFILTRATION; INTRACAUDAL; PERINEURAL at 13:08

## 2019-08-31 RX ADMIN — PREDNISONE 10 MG: 10 TABLET ORAL at 08:27

## 2019-08-31 RX ADMIN — CALCITRIOL CAPSULES 0.25 MCG 0.5 MCG: 0.25 CAPSULE ORAL at 11:30

## 2019-08-31 RX ADMIN — ERTAPENEM SODIUM 1 G: 1 INJECTION, POWDER, LYOPHILIZED, FOR SOLUTION INTRAMUSCULAR; INTRAVENOUS at 04:42

## 2019-08-31 RX ADMIN — INSULIN ASPART 1 UNITS: 100 INJECTION, SOLUTION INTRAVENOUS; SUBCUTANEOUS at 13:46

## 2019-08-31 RX ADMIN — APIXABAN 2.5 MG: 2.5 TABLET, FILM COATED ORAL at 08:25

## 2019-08-31 RX ADMIN — ACETAMINOPHEN 650 MG: 325 TABLET, FILM COATED ORAL at 08:25

## 2019-08-31 RX ADMIN — LEVOTHYROXINE SODIUM 175 MCG: 150 TABLET ORAL at 08:26

## 2019-08-31 RX ADMIN — FOLIC ACID 1 MG: 1 TABLET ORAL at 08:26

## 2019-08-31 RX ADMIN — URSODIOL 250 MG: 250 TABLET, FILM COATED ORAL at 08:27

## 2019-08-31 RX ADMIN — OMEGA-3-ACID ETHYL ESTERS 1 G: 1 CAPSULE, LIQUID FILLED ORAL at 08:27

## 2019-08-31 RX ADMIN — LOSARTAN POTASSIUM 25 MG: 25 TABLET ORAL at 08:27

## 2019-08-31 RX ADMIN — HYDRALAZINE HYDROCHLORIDE 25 MG: 25 TABLET ORAL at 11:28

## 2019-08-31 RX ADMIN — FERROUS GLUCONATE 324 MG: 324 TABLET ORAL at 11:30

## 2019-08-31 RX ADMIN — FERROUS GLUCONATE 324 MG: 324 TABLET ORAL at 11:31

## 2019-08-31 RX ADMIN — SIROLIMUS 1 MG: 1 TABLET, SUGAR COATED ORAL at 08:30

## 2019-08-31 ASSESSMENT — ACTIVITIES OF DAILY LIVING (ADL)
ADLS_ACUITY_SCORE: 14
ADLS_ACUITY_SCORE: 16
ADLS_ACUITY_SCORE: 16
ADLS_ACUITY_SCORE: 14
ADLS_ACUITY_SCORE: 16

## 2019-08-31 NOTE — PLAN OF CARE
Pt a/o x 3. VSS on RA. Single lumen PICC placed today and is ok to use. Medically cleared for transfer to [facility] today ~ 1600. Nurse to nurse report called. SW following and pt will be taking an uber to facility. Orders signed and faxed. Will continue POC.

## 2019-08-31 NOTE — DISCHARGE SUMMARY
[facility], [place]  Hospitalist Discharge Summary       Date of Admission:  8/26/2019  Date of Discharge:  8/31/2019  Discharging Provider: Aliya Pace MD  Discharge Team: Hospitalist Service, Gold     Discharge Diagnoses   E. Coli bacteremia and sepsis secondary to pyelonephritis with DREREK    Follow-ups Needed After Discharge   Follow-up Appointments     Follow Up and recommended labs and tests      Follow up with Dr. Vasquez (Endocrinology) as soon as possible.  No   follow up labs or test are needed.             Unresulted Labs Ordered in the Past 30 Days of this Admission     Date and Time Order Name Status Description    8/30/2019 0000 Blood culture Preliminary     8/29/2019 0000 Blood culture Preliminary     8/28/2019 1613 Blood culture Preliminary     8/28/2019 1147 Blood culture Preliminary       These results will be followed up by me.    Discharge Disposition   Discharged to rehabilitation facility  Condition at discharge: Stable    Hospital Course      Luz Thompson is a 69 year old female admitted on 8/26/2019. 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    #  Sepsis secondary to E coli bacteremia and pyelonephritis with DERREK  Urine and blood positive for E coli, given bacteremia treated with ertapenem.  Will treat for total 14 days from first day of negative blood culture (through 9/11) and then transition to cefpodoxime through 9/25.  DERREK improved.  Appreciate involvement of Transplant ID to guide antibiotic therapy.  -  Continue ertapenem through 9/11 to complete 14 day course from the day of the first negative blood culture.  Follow this by 14 days of oral cefpodoxime 9/12 through 9/25  -  PICC line for outpatient antibiotic access    #  Mild hyperglycemia in the setting of DM II  Per patient  request, contacted primary endocrinologist Dr. Vasquez who recommended NPH 10 units qAM.    -  NPH 10 units qAM  -  Follow up with Endocrine as an outpatient    #  History of a liver transplant in 2002  #  Elevated transaminases, improved  -  Appreciate involvement of Hepatology  -  Continue home immunosuppression    #  A fib  -  Continue apixaban, metoprolol, losartan, ezetimibe  -  Receives alirocumab (Praluent) every 2 weeks    #  Hypothyroid  -  Continue levothyroxine 150/175 every other day    #  Depression  -  Reinitiating sertraline, per outpatient plan will take 50mg at bedtime for 2 weeks and then increase to 100mg.  Keeping 50mg at bedtime for now.      Consultations This Hospital Stay   GI HEPATOLOGY ADULT IP CONSULT  INFECTIOUS DISEASE TRANSPLANT SOT ADULT IP CONSULT  MEDICATION HISTORY IP PHARMACY CONSULT  SPIRITUAL HEALTH SERVICES IP CONSULT  SOCIAL WORK IP CONSULT  PHYSICAL THERAPY ADULT IP CONSULT  OCCUPATIONAL THERAPY ADULT IP CONSULT  VASCULAR ACCESS CARE ADULT IP CONSULT  VASCULAR ACCESS ADULT IP CONSULT  VASCULAR ACCESS ADULT IP CONSULT  PHYSICAL THERAPY ADULT IP CONSULT  OCCUPATIONAL THERAPY ADULT IP CONSULT    Code Status   Full Code    Time Spent on this Encounter   I, Aliya Pace MD, personally saw the patient today and spent greater than 30 minutes discharging this patient.       Aliya Pace MD  [facility], [place]  ______________________________________________________________________    Physical Exam   Vital Signs: Temp: 98.7  F (37.1  C) Temp src: Oral BP: (!) 164/72 Pulse: 62 Heart Rate: 64 Resp: 16 SpO2: 95 % O2 Device: None (Room air)    Weight: 188 lbs 14.4 oz  Constitutional: awake, alert, cooperative, no apparent distress, and appears stated age  ENT: EOMI, MMM  Respiratory: CTAB  Cardiovascular: RRR, no m/r/g, peripheral pulses intact  GI: NABS, soft, NT, ND       Primary Care Physician   Jennifer Barraza    Discharge Orders       General info for SNF    Length of Stay Estimate: Short Term Care: Estimated # of Days <30  Condition at Discharge: Stable  Level of care:skilled   Rehabilitation Potential: Good  Admission H&P remains valid and up-to-date: Yes  Recent Chemotherapy: N/A  Use Nursing Home Standing Orders: Yes     Mantoux instructions    Give two-step Mantoux (PPD) Per Facility Policy Yes     Reason for your hospital stay    You were hospitalized for sepsis from a blood stream infection related to a kidney infection.     Glucose monitor nursing POCT    Before meals and at bedtime     IV access    PICC.     Follow Up and recommended labs and tests    Follow up with Dr. Vasquez (Endocrinology) as soon as possible.  No follow up labs or test are needed.     Activity - Up with nursing assistance     Full Code     Physical Therapy Adult Consult    Evaluate and treat as clinically indicated.    Reason:  Deconditioning     Occupational Therapy Adult Consult    Evaluate and treat as clinically indicated.    Reason:  Deconditioning, cognitive decline.  Will need MOCA.     Contact Isolation     Fall precautions     Advance Diet as Tolerated    Follow this diet upon discharge: Orders Placed This Encounter      Room Service      Combination Diet Regular Diet Adult       Significant Results and Procedures   Most Recent 3 CBC's:  Recent Labs   Lab Test 08/30/19  0657 08/29/19  0544 08/28/19  0528   WBC 6.0 7.3 7.2   HGB 9.2* 9.6* 9.1*   MCV 90 90 89    255 225       Discharge Medications   Current Discharge Medication List      START taking these medications    Details   acetaminophen (TYLENOL) 325 MG tablet Take 2 tablets (650 mg) by mouth every 6 hours as needed for mild pain or fever    Associated Diagnoses: Other acute back pain      cefpodoxime (VANTIN) 200 MG tablet Take 1 tablet (200 mg) by mouth 2 times daily for 13 days    Associated Diagnoses: Escherichia coli sepsis (H)      clotrimazole (MYCELEX) 10 MG LOZG lozenge Place 1  lozenge (1 Harinder) inside cheek 3 times daily as needed (for thrush)    Associated Diagnoses: Liver replaced by transplant (H)      ertapenem (INVANZ) 1 GM vial Inject 1 g into the vein every 24 hours for 10 days    Associated Diagnoses: Escherichia coli sepsis (H)      glucose 40 % (400 mg/mL) gel Take 15-30 g by mouth every 15 minutes as needed for low blood sugar    Associated Diagnoses: Type 2 diabetes, HbA1c goal < 7% (H); Type 2 diabetes mellitus with stage 3 chronic kidney disease, without long-term current use of insulin (H)      Guar Gum (FIBER MODULAR, NUTRISOURCE FIBER,) packet 1 packet by Oral or Feeding Tube route daily    Associated Diagnoses: Liver replaced by transplant (H)      insulin isophane human (HUMULIN N PEN) 100 UNIT/ML injection Inject 10 Units Subcutaneous every morning (before breakfast)    Associated Diagnoses: Type 2 diabetes, HbA1c goal < 7% (H); Type 2 diabetes mellitus with stage 3 chronic kidney disease, without long-term current use of insulin (H)      sennosides (SENOKOT) 8.6 MG tablet Take 2 tablets by mouth 2 times daily as needed for constipation    Associated Diagnoses: Liver replaced by transplant (H)         CONTINUE these medications which have CHANGED    Details   alirocumab (PRALUENT) 150 MG/ML injectable pen Inject 1 mL (150 mg) Subcutaneous every 14 days  Qty: 6 mL, Refills: 3    Associated Diagnoses: Statin intolerance; Hyperlipidemia LDL goal <70      calcitRIOL (ROCALTROL) 0.5 MCG capsule Take 1 capsule (0.5 mcg) by mouth daily  Qty: 90 capsule, Refills: 3    Associated Diagnoses: Postablative hypoparathyroidism      ciprofloxacin-dexamethasone (CIPRODEX) 0.3-0.1 % otic suspension Place 4 drops Into the left ear three times a week  Qty: 5.6 mL, Refills: 6    Associated Diagnoses: Other infective chronic otitis externa of left ear; Tympanic membrane perforation, left; Sensorineural hearing loss (SNHL) of both ears      clotrimazole (LOTRIMIN) 1 % external cream Apply  topically 2 times daily as needed Reported on 4/25/2017    Associated Diagnoses: Liver replaced by transplant (H)      ELIQUIS 2.5 MG tablet Take 1 tablet (2.5 mg) by mouth 2 times daily  Qty: 180 tablet, Refills: 3    Associated Diagnoses: Paroxysmal atrial fibrillation (H)      estradiol (ESTRACE) 0.1 MG/GM vaginal cream Place 2 g vaginally twice a week    Associated Diagnoses: Liver replaced by transplant (H)      ezetimibe (ZETIA) 10 MG tablet Take 1 tablet (10 mg) by mouth daily  Qty: 90 tablet, Refills: 3    Associated Diagnoses: Hyperlipidemia LDL goal <70; Benign essential hypertension      ferrous gluconate (FERGON) 324 (38 Fe) MG tablet Take 1 tablet (324 mg) by mouth 3 times daily (with meals)  Qty: 90 tablet, Refills: 0    Associated Diagnoses: Liver replaced by transplant (H)      folic acid (FOLVITE) 1 MG tablet Take 1 tablet (1 mg) by mouth daily  Qty: 100 tablet, Refills: 3    Associated Diagnoses: Liver replaced by transplant (H)      furosemide (LASIX) 20 MG tablet Take 1 tablet (20 mg) by mouth daily  Qty: 90 tablet, Refills: 3    Associated Diagnoses: CKD (chronic kidney disease) stage 3, GFR 30-59 ml/min (H); Liver replaced by transplant (H)      hydrALAZINE (APRESOLINE) 25 MG tablet Take 1 tablet (25 mg) by mouth 3 times daily as needed (For SBP >140)    Associated Diagnoses: Benign essential hypertension      hypromellose (ARTIFICIAL TEARS) 0.4 % SOLN ophthalmic solution Apply 1 drop to eye every hour as needed for dry eyes    Associated Diagnoses: Liver replaced by transplant (H)      !! levothyroxine (SYNTHROID/LEVOTHROID) 150 MCG tablet Take 1 tablet (150 mcg) by mouth every other day    Associated Diagnoses: Postablative hypoparathyroidism      !! levothyroxine (SYNTHROID/LEVOTHROID) 175 MCG tablet Take 1 tablet (175 mcg) by mouth every other day    Associated Diagnoses: Postablative hypoparathyroidism      losartan (COZAAR) 25 MG tablet Take 1 tablet (25 mg) by mouth daily    Associated  Diagnoses: Hypertension goal BP (blood pressure) < 140/80      meclizine (ANTIVERT) 25 MG tablet Take 1 tablet (25 mg) by mouth as needed for dizziness or other (migraines)  Qty: 30 tablet, Refills: 11    Associated Diagnoses: Dizziness      metoprolol succinate ER (TOPROL-XL) 50 MG 24 hr tablet Take 1 tablet (50 mg) by mouth daily  Qty: 90 tablet, Refills: 3    Associated Diagnoses: Paroxysmal atrial fibrillation (H)      metroNIDAZOLE (METROGEL) 0.75 % external gel Apply topically 2 times daily Put on face  Qty: 45 g, Refills: 3    Associated Diagnoses: Rosacea      Multiple Vitamins-Minerals (PRESERVISION AREDS 2) CAPS Take 1 capsule by mouth 2 times daily    Associated Diagnoses: Liver replaced by transplant (H)      omega-3 acid ethyl esters (LOVAZA) 1 g capsule Take 1 capsule (1 g) by mouth 2 times daily  Qty: 180 capsule, Refills: 3    Associated Diagnoses: Hypertriglyceridemia      predniSONE (DELTASONE) 10 MG tablet Take 1 tablet (10 mg) by mouth daily    Associated Diagnoses: Liver replaced by transplant (H)      RAPAMUNE (BRAND) 1 MG tablet Take 1 tablet (1 mg) by mouth every other day  Qty: 45 tablet, Refills: 3    Associated Diagnoses: Liver replaced by transplant (H)      sertraline (ZOLOFT) 50 MG tablet Take 1 tablet (50 mg) by mouth At Bedtime    Associated Diagnoses: Liver replaced by transplant (H)      SUMAtriptan (IMITREX) 50 MG tablet Take 1 tablet (50 mg) by mouth at onset of headache for migraine repeat after 2 hours if needed.  Qty: 30 tablet, Refills: 3    Associated Diagnoses: Migraine without aura and without status migrainosus, not intractable      ursodiol (ACTIGALL) 250 MG tablet Take 1 tablet (250 mg) by mouth 2 times daily    Associated Diagnoses: Liver replaced by transplant (H)      voriconazole (VFEND) 200 MG tablet Take 1 tablet (200 mg) by mouth 2 times daily  Qty: 180 tablet, Refills: 0    Associated Diagnoses: Coccidioidal pneumonitis (H)      Wheat Dextrin (BENEFIBER) POWD 2  teaspoons daily    Associated Diagnoses: Liver replaced by transplant (H)       !! - Potential duplicate medications found. Please discuss with provider.      CONTINUE these medications which have NOT CHANGED    Details   !! blood glucose (ACCU-CHEK GUIDE) test strip Use to test blood sugar 2 times daily or as directed.  Qty: 100 strip, Refills: 11    Associated Diagnoses: Type 2 diabetes mellitus with stage 3 chronic kidney disease, without long-term current use of insulin (H)      blood glucose monitoring (ACCU-CHEK FASTCLIX) lancets Use to test blood sugar 2 times daily or as directed.  Qty: 102 each, Refills: 11    Associated Diagnoses: Type 2 diabetes mellitus with stage 3 chronic kidney disease, without long-term current use of insulin (H)      blood glucose monitoring (NO BRAND SPECIFIED) meter device kit Use to test blood sugar 2times daily or as directed.  Qty: 1 kit, Refills: 0    Associated Diagnoses: Type 2 diabetes mellitus with stage 3 chronic kidney disease, without long-term current use of insulin (H)      !! blood glucose monitoring (NO BRAND SPECIFIED) test strip Use to test blood sugar 2 times daily, before breakfast and before bedtime  Qty: 200 each, Refills: 3    Associated Diagnoses: Type 2 diabetes mellitus with stage 3 chronic kidney disease, without long-term current use of insulin (H)      order for DME Equipment being ordered: right brace with thumb  Qty: 1 Device, Refills: 0    Associated Diagnoses: De Quervain's disease (tenosynovitis)       !! - Potential duplicate medications found. Please discuss with provider.      STOP taking these medications       acetaminophen 500 MG CAPS Comments:   Reason for Stopping:         nitroFURantoin, macrocrystal-monohydrate, (MACROBID) 100 MG capsule Comments:   Reason for Stopping:             Allergies   Allergies   Allergen Reactions     Fluconazole Hives and Itching     Azithromycin Itching     Benadryl [Diphenhydramine Hcl]      Insomnia       Cellcept Diarrhea     Ciprofloxacin Other (See Comments)     Insomnia, mood lability, Irregular heart beat, anxiety     Codeine      Psych disturbance     Diphenhydramine Other (See Comments)     Doxycycline      Lansoprazole Diarrhea     Levaquin [Levofloxacin] Other (See Comments)     Headache, hyperactivity     Lisinopril Cough     Methotrexate      Sores     Morphine Sulfate Itching     Mycophenolate Diarrhea     Simvastatin Muscle Pain (Myalgia)     severe     Cephalexin Rash     Fever and skin burning     Penicillin G Itching and Rash     Tolectin [Nsaids] Rash     Tramadol Rash

## 2019-08-31 NOTE — PLAN OF CARE
"  BP (!) 163/67 (BP Location: Left arm)   Pulse 62   Temp 98.7  F (37.1  C) (Oral)   Resp 16   Ht 1.651 m (5' 5\")   Wt 85.7 kg (188 lb 14.4 oz)   LMP 06/01/1988 (Approximate)   SpO2 95%   Breastfeeding? No   BMI 31.43 kg/m  Patient slept between cares.  VSS on RA.  Hypertensive but brought down with scheduled Alert/ox3. PRN hydralazine available for hypertension.Pt declined.  Anticipate PICC line placement today and potential TCU placement for IV ABX.  BS     "

## 2019-08-31 NOTE — PLAN OF CARE
Patient is alert and oriented x4, Patient is vitally stable on room air. The patient denies pain and nausea. The patient received discharge orders to go to [facility]. The patient left the unit at 4:25 PM via wheelchair with all of her belongings and her packet for the facility.

## 2019-09-03 LAB
BACTERIA SPEC CULT: NO GROWTH
BACTERIA SPEC CULT: NO GROWTH
Lab: NORMAL
Lab: NORMAL
SPECIMEN SOURCE: NORMAL
SPECIMEN SOURCE: NORMAL

## 2019-09-03 NOTE — PLAN OF CARE
Occupational Therapy Discharge Summary    Reason for therapy discharge:    Discharged to transitional care facility.    Progress towards therapy goal(s). See goals on Care Plan in [facility] electronic health record for goal details.  Goals not met.  Barriers to achieving goals:   discharge from facility.    Therapy recommendation(s):    Continued therapy is recommended.  Rationale/Recommendations:  TCU to progress functional independence.

## 2019-09-03 NOTE — PLAN OF CARE
Physical Therapy Discharge Summary    Reason for therapy discharge:    Discharged to transitional care facility.    Progress towards therapy goal(s). See goals on Care Plan in [facility] electronic health record for goal details.  Goals partially met.  Barriers to achieving goals:   discharge from facility.    Therapy recommendation(s):    Continued therapy is recommended.  Rationale/Recommendations:  to improve functional strength, activity tolerance and safety with mobility.

## 2019-09-04 ENCOUNTER — NURSING HOME VISIT (OUTPATIENT)
Dept: GERIATRICS | Facility: CLINIC | Age: 69
End: 2019-09-04
Payer: MEDICARE

## 2019-09-04 VITALS
WEIGHT: 187.2 LBS | BODY MASS INDEX: 31.15 KG/M2 | OXYGEN SATURATION: 95 % | DIASTOLIC BLOOD PRESSURE: 67 MMHG | TEMPERATURE: 97.1 F | SYSTOLIC BLOOD PRESSURE: 117 MMHG | HEART RATE: 73 BPM | RESPIRATION RATE: 16 BRPM

## 2019-09-04 DIAGNOSIS — R53.81 PHYSICAL DECONDITIONING: Primary | ICD-10-CM

## 2019-09-04 DIAGNOSIS — F33.42 RECURRENT MAJOR DEPRESSIVE DISORDER, IN FULL REMISSION (H): ICD-10-CM

## 2019-09-04 DIAGNOSIS — B38.2: ICD-10-CM

## 2019-09-04 DIAGNOSIS — E11.22 TYPE 2 DIABETES MELLITUS WITH STAGE 3 CHRONIC KIDNEY DISEASE, WITHOUT LONG-TERM CURRENT USE OF INSULIN (H): ICD-10-CM

## 2019-09-04 DIAGNOSIS — E89.0 POSTOPERATIVE HYPOTHYROIDISM: ICD-10-CM

## 2019-09-04 DIAGNOSIS — A41.51 ESCHERICHIA COLI SEPSIS (H): ICD-10-CM

## 2019-09-04 DIAGNOSIS — I48.20 CHRONIC ATRIAL FIBRILLATION (H): ICD-10-CM

## 2019-09-04 DIAGNOSIS — N18.30 CKD (CHRONIC KIDNEY DISEASE) STAGE 3, GFR 30-59 ML/MIN (H): ICD-10-CM

## 2019-09-04 DIAGNOSIS — Z94.4 LIVER REPLACED BY TRANSPLANT (H): ICD-10-CM

## 2019-09-04 DIAGNOSIS — N18.30 TYPE 2 DIABETES MELLITUS WITH STAGE 3 CHRONIC KIDNEY DISEASE, WITHOUT LONG-TERM CURRENT USE OF INSULIN (H): ICD-10-CM

## 2019-09-04 LAB
BACTERIA SPEC CULT: NO GROWTH
Lab: NORMAL
SPECIMEN SOURCE: NORMAL

## 2019-09-04 PROCEDURE — 99310 SBSQ NF CARE HIGH MDM 45: CPT | Performed by: NURSE PRACTITIONER

## 2019-09-04 NOTE — PROGRESS NOTES
[facility]  PRIMARY CARE PROVIDER AND CLINIC:  Jennifer Barraza MD, [id number]  / [facility] [id number]  Chief Complaint   Patient presents with     Hospital F/U     [facility] Medical Record Number:  [id number]  Place of Service where encounter took place:  [facility] ([facility]) [[id number]]    Luz Thompson  is a 69 year old  (1950), admitted to the above facility from  [facility]. Hospital stay 8/26/19 through 8/31/19..  Admitted to this facility for  rehab, medical management and nursing care.    HPI information obtained from: patient report and [facility] chart review.   Brief Summary of Hospital Course: treated for pyelonephritis and DERREK.   MEDICATION CHANGES: Vantin x 13 days, clotrimazole lozge PRN for thrush, Envanz IV x 10 days,  Fiber packet, NPH 10 units in AM.   RECOMMENDED FOLLOW UP: Dr. Vasquez of endocrinology.   Updates on Status Since Skilled nursing Admission: none    CODE STATUS/ADVANCE DIRECTIVES DISCUSSION:   CPR/Full code   Patient's living condition: lives with spouse    ALLERGIES: Fluconazole; Azithromycin; Benadryl [diphenhydramine hcl]; Cellcept; Ciprofloxacin; Codeine; Diphenhydramine; Doxycycline; Lansoprazole; Levaquin [levofloxacin]; Lisinopril; Methotrexate; Morphine sulfate; Mycophenolate; Simvastatin; Cephalexin; Penicillin g; Tolectin [nsaids]; and Tramadol  PAST MEDICAL HISTORY:  has a past medical history of Anemia of other chronic disease (10/17/2011), Anxiety, Bladder infection, chronic (4/4/2012), CKD (chronic kidney disease) stage 3, GFR 30-59 ml/min (H) (4/4/2012), 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), CVA (cerebral vascular accident) (H) (2001), Diverticulosis of sigmoid colon (12/21/2013), 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, H/O esophageal varices, Hearing loss, Heart murmur (4/4/2012), History of DVT (deep vein thrombosis), History of [place] fever, History of thyroid cancer (9/25/2012),  Hyperlipidemia (4/10/2012), Hyperlipidemia LDL goal <70, Hypertension goal BP (blood pressure) < 140/80 (11/06/2013), Hypertriglyceridemia, Liver replaced by transplant (H) (10/17/2011), Macular degeneration, Migraines (4/4/2012), Nonsenile cataract, Osteoarthritis of right knee (8/2/2012), Osteoporosis (4/20/2012), Paroxysmal A-fib (H) (6/13/2017), Postablative hypothyroidism (8/13/2012), Primary biliary cirrhosis (H), Sjogren's syndrome (H), Type 2 diabetes, HbA1c goal < 7% (H), Unspecified glaucoma(365.9), Vitamin D deficiency (10/1/2012), and 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  PAST SURGICAL HISTORY:   has a past surgical history that includes Thyroidectomy (3/2010); appendectomy (1961); Cholecystectomy (1991); KNEE SCOPE,SINGLE MENISECTOMY (8/10/12); Transplant liver recipient living unrelated (); knee surgery (1966); picc insertion (9/18/2013); Endoscopic retrograde cholangiopancreatogram (9/19/2013); biopsy; cataract iol, rt/lt; picc insertion (2/21/2014); Colonoscopy (3/10/2014); Endobronchial ultrasound flexible (N/A, 9/29/2017); and ear drum repair.  FAMILY HISTORY: family history includes Allergies in her son; Alzheimer Disease in her paternal grandfather; Breast Cancer in an other family member; Cancer - colorectal in her maternal grandmother; Cerebrovascular Disease in her paternal grandmother; Endometrial Cancer in her mother; Glaucoma in her maternal grandfather; Heart Disease in her maternal grandfather and paternal grandfather; Hyperlipidemia in her mother; Hypertension in her mother and paternal grandmother; Macular Degeneration in her father; Neurologic Disorder in her daughter; Prostate Cancer in her father.  SOCIAL HISTORY:   reports that she quit smoking about 34 years ago. Her smoking use included cigarettes. She has a 18.00 pack-year smoking history. She has never used smokeless tobacco. She reports that she drinks alcohol. She reports that she does not use drugs.    Post Discharge Medication  Reconciliation Status: discharge medications reconciled, continue medications without change  Current Outpatient Medications   Medication Sig Dispense Refill     acetaminophen (TYLENOL) 325 MG tablet Take 2 tablets (650 mg) by mouth every 6 hours as needed for mild pain or fever       calcitRIOL (ROCALTROL) 0.5 MCG capsule Take 1 capsule (0.5 mcg) by mouth daily 90 capsule 3     [START ON 9/12/2019] cefpodoxime (VANTIN) 200 MG tablet Take 1 tablet (200 mg) by mouth 2 times daily for 13 days       ciprofloxacin-dexamethasone (CIPRODEX) 0.3-0.1 % otic suspension Place 4 drops Into the left ear three times a week 5.6 mL 6     clotrimazole (LOTRIMIN) 1 % external cream Apply topically 2 times daily as needed Reported on 4/25/2017       clotrimazole (MYCELEX) 10 MG LOZG lozenge Place 1 lozenge (1 Harinder) inside cheek 3 times daily as needed (for thrush)       ELIQUIS 2.5 MG tablet Take 1 tablet (2.5 mg) by mouth 2 times daily 180 tablet 3     ertapenem (INVANZ) 1 GM vial Inject 1 g into the vein every 24 hours for 10 days       estradiol (ESTRACE) 0.1 MG/GM vaginal cream Place 2 g vaginally twice a week       ezetimibe (ZETIA) 10 MG tablet Take 1 tablet (10 mg) by mouth daily 90 tablet 3     ferrous gluconate (FERGON) 324 (38 Fe) MG tablet Take 1 tablet (324 mg) by mouth 3 times daily (with meals) 90 tablet 0     folic acid (FOLVITE) 1 MG tablet Take 1 tablet (1 mg) by mouth daily 100 tablet 3     furosemide (LASIX) 20 MG tablet Take 1 tablet (20 mg) by mouth daily 90 tablet 3     glucose 40 % (400 mg/mL) gel Take 15-30 g by mouth every 15 minutes as needed for low blood sugar       Guar Gum (FIBER MODULAR, NUTRISOURCE FIBER,) packet 1 packet by Oral or Feeding Tube route daily       hydrALAZINE (APRESOLINE) 25 MG tablet Take 1 tablet (25 mg) by mouth 3 times daily as needed (For SBP >140)       hypromellose (ARTIFICIAL TEARS) 0.4 % SOLN ophthalmic solution Apply 1 drop to eye every hour as needed for dry eyes        insulin isophane human (HUMULIN N PEN) 100 UNIT/ML injection Inject 10 Units Subcutaneous every morning (before breakfast)       levothyroxine (SYNTHROID/LEVOTHROID) 150 MCG tablet Take 1 tablet (150 mcg) by mouth every other day       levothyroxine (SYNTHROID/LEVOTHROID) 175 MCG tablet Take 1 tablet (175 mcg) by mouth every other day       linagliptin (TRADJENTA) 5 MG TABS tablet Take 1 tablet (5 mg) by mouth daily 90 tablet 3     losartan (COZAAR) 25 MG tablet Take 1 tablet (25 mg) by mouth daily       meclizine (ANTIVERT) 25 MG tablet Take 1 tablet (25 mg) by mouth as needed for dizziness or other (migraines) 30 tablet 11     metoprolol succinate ER (TOPROL-XL) 50 MG 24 hr tablet Take 1 tablet (50 mg) by mouth daily 90 tablet 3     metroNIDAZOLE (METROGEL) 0.75 % external gel Apply topically 2 times daily Put on face 45 g 3     Multiple Vitamins-Minerals (PRESERVISION AREDS 2) CAPS Take 1 capsule by mouth 2 times daily       omega-3 acid ethyl esters (LOVAZA) 1 g capsule Take 1 capsule (1 g) by mouth 2 times daily 180 capsule 3     predniSONE (DELTASONE) 10 MG tablet Take 1 tablet (10 mg) by mouth daily       RAPAMUNE (BRAND) 1 MG tablet Take 1 tablet (1 mg) by mouth every other day 45 tablet 3     sennosides (SENOKOT) 8.6 MG tablet Take 2 tablets by mouth 2 times daily as needed for constipation       sertraline (ZOLOFT) 50 MG tablet Take 1 tablet (50 mg) by mouth At Bedtime       SUMAtriptan (IMITREX) 50 MG tablet Take 1 tablet (50 mg) by mouth at onset of headache for migraine repeat after 2 hours if needed. 30 tablet 3     ursodiol (ACTIGALL) 250 MG tablet Take 1 tablet (250 mg) by mouth 2 times daily       voriconazole (VFEND) 200 MG tablet Take 1 tablet (200 mg) by mouth 2 times daily 180 tablet 0     Wheat Dextrin (BENEFIBER) POWD 2 teaspoons daily       alirocumab (PRALUENT) 150 MG/ML injectable pen Inject 1 mL (150 mg) Subcutaneous every 14 days 6 mL 3     blood glucose (ACCU-CHEK GUIDE) test strip Use  to test blood sugar 2 times daily or as directed. 100 strip 11     blood glucose monitoring (ACCU-CHEK FASTCLIX) lancets Use to test blood sugar 2 times daily or as directed. 102 each 11     blood glucose monitoring (NO BRAND SPECIFIED) meter device kit Use to test blood sugar 2times daily or as directed. 1 kit 0     blood glucose monitoring (NO BRAND SPECIFIED) test strip Use to test blood sugar 2 times daily, before breakfast and before bedtime 200 each 3     order for DME Equipment being ordered: right brace with thumb 1 Device 0       REVIEW OF SYSTEMS:   4 point ROS including Respiratory, CV, GI and , other than that noted in the HPI,  is negative    PHYSICAL EXAM:  /67   Pulse 73   Temp 97.1  F (36.2  C)   Resp 16   Wt 84.9 kg (187 lb 3.2 oz)   LMP 06/01/1988 (Approximate)   SpO2 95%   BMI 31.15 kg/m    GENERAL APPEARANCE:  Alert, in no distress  RESP:  respiratory effort and palpation of chest normal, no respiratory distress, lungs sounds clear  CV:  Palpation and auscultation of heart done , regular rate and rhythm, no murmur, rub, or gallop, edema none  ABDOMEN:  normal bowel sounds, soft, nontender, no hepatosplenomegaly or other masses  M/S:  Gait and station observed in wheelchair and walking with walker and standby assist in therapy, no tenderness or swelling of the joints   SKIN:  Inspection and palpation of skin and subcutaneous tissue at baseline  PSYCH:  insight and judgement, memory seems intact, affect and mood normal    ASSESSMENT/PLAN:  Physical deconditioning  Admitted to the TCU for therapy following hospital stay.  Will continue with physical therapy and Occupational Therapy.    Escherichia coli sepsis (H)  Currently on IV antibiotics through 9/11.  And then should start Cefpodoxime PO 9/12 through 9/25.  -Adjusted timing of p.o. antibiotic to be given after IV antibiotic completed.    Liver replaced by transplant (H)  Elevated liver labs during hospital stay.  Improved.   Continue prior to admission medications and immunosuppression.    CKD (chronic kidney disease) stage 3, GFR 30-59 ml/min (H)  Creatinine   Date Value Ref Range Status   08/30/2019 1.53 (H) 0.52 - 1.04 mg/dL Final   Continue to keep kidney function in mind with illness and medication changes.    Type 2 diabetes mellitus with stage 3 chronic kidney disease, without long-term current use of insulin (H)  Patient being followed by endocrinologist.  They had been discussing starting linagliptin for her diabetes.  NPH insulin was started in the hospital.  Patient relays that she thinks she would be able to do insulin at home but really does not want to.  Relayed to her that we may need to continue insulin during the short-term but likely be able to transition off.  Fasting blood sugars in the 120s. mealtime blood sugars 200.  Patient also relays that she is not happy with her diet.  Currently on a regular diet.  -Okay to start linagliptin 5 mg once daily  -Discontinue NPH  -Follow-up next week  -Okay for diabetic diet    Chronic atrial fibrillation (H)  Continues on prior to admission meds of metoprolol and apixaban    Recurrent major depressive disorder, in full remission (H)  Denies concerns at this time.  From chart review it looks like sertraline was recently restarted.    Coccidioidomycotic pneumonitis (H)  Continues on VFEND.  She plans to go back to [place] soon.    Postoperative hypothyroidism  Continue outpatient regimen of 150 mcg alternating with 175 mcg.      Total time spent with patient visit at the skilled nursing facility was 35 min including patient visit and review of past records. Greater than 50% of total time spent with counseling and coordinating care due to coordinating care with nurse on admission orders and counseling patient/resident for 20 minutes on: the reason for their hospitalization and what the treatment is, the plan of SNF stay and projected length of stay, current medications (treatments)  reconciled from the hospital and recent past lab and imaging results and subsequent treatment plan.    Electronically signed by:  Samira HILL CNP    Orders written by provider at facility and transcribed by : Vivian Owens MA   1.  Diabetic diet.  2.  Discontinue NPH.  3.  Start Linagliptin 5 mg po every day.  Dx: DM2  4.  Change dates for Cefpodoxime to 9/12-9/25.

## 2019-09-05 LAB
BACTERIA SPEC CULT: NO GROWTH
Lab: NORMAL
SPECIMEN SOURCE: NORMAL

## 2019-09-06 PROBLEM — R53.81 PHYSICAL DECONDITIONING: Status: ACTIVE | Noted: 2019-09-06

## 2019-09-09 ENCOUNTER — NURSING HOME VISIT (OUTPATIENT)
Dept: GERIATRICS | Facility: CLINIC | Age: 69
End: 2019-09-09
Payer: MEDICARE

## 2019-09-09 VITALS
TEMPERATURE: 97.9 F | HEART RATE: 62 BPM | RESPIRATION RATE: 16 BRPM | WEIGHT: 183.4 LBS | DIASTOLIC BLOOD PRESSURE: 65 MMHG | BODY MASS INDEX: 30.52 KG/M2 | OXYGEN SATURATION: 92 % | SYSTOLIC BLOOD PRESSURE: 130 MMHG

## 2019-09-09 DIAGNOSIS — E89.0 POSTOPERATIVE HYPOTHYROIDISM: ICD-10-CM

## 2019-09-09 DIAGNOSIS — A41.51 ESCHERICHIA COLI SEPSIS (H): ICD-10-CM

## 2019-09-09 DIAGNOSIS — F33.42 RECURRENT MAJOR DEPRESSIVE DISORDER, IN FULL REMISSION (H): ICD-10-CM

## 2019-09-09 DIAGNOSIS — R53.81 PHYSICAL DECONDITIONING: Primary | ICD-10-CM

## 2019-09-09 DIAGNOSIS — N18.30 TYPE 2 DIABETES MELLITUS WITH STAGE 3 CHRONIC KIDNEY DISEASE, WITHOUT LONG-TERM CURRENT USE OF INSULIN (H): ICD-10-CM

## 2019-09-09 DIAGNOSIS — N18.30 CKD (CHRONIC KIDNEY DISEASE) STAGE 3, GFR 30-59 ML/MIN (H): ICD-10-CM

## 2019-09-09 DIAGNOSIS — Z94.4 LIVER REPLACED BY TRANSPLANT (H): ICD-10-CM

## 2019-09-09 DIAGNOSIS — I48.20 CHRONIC ATRIAL FIBRILLATION (H): ICD-10-CM

## 2019-09-09 DIAGNOSIS — E11.22 TYPE 2 DIABETES MELLITUS WITH STAGE 3 CHRONIC KIDNEY DISEASE, WITHOUT LONG-TERM CURRENT USE OF INSULIN (H): ICD-10-CM

## 2019-09-09 DIAGNOSIS — B38.2: ICD-10-CM

## 2019-09-09 PROCEDURE — 99309 SBSQ NF CARE MODERATE MDM 30: CPT | Performed by: NURSE PRACTITIONER

## 2019-09-09 RX ORDER — TRIAMCINOLONE ACETONIDE 40 MG/ML
40 INJECTION, SUSPENSION INTRA-ARTICULAR; INTRAMUSCULAR
Status: DISCONTINUED | OUTPATIENT
Start: 2019-08-13 | End: 2019-09-13

## 2019-09-09 NOTE — PROGRESS NOTES
[facility]  PRIMARY CARE PROVIDER AND CLINIC:  Jennifer Barraza MD, [address] [address]  / [place] [id number]  Chief Complaint   Patient presents with     RECHECK     [facility] Medical Record Number:  [id number]  Place of Service where encounter took place:  [facility] ([facility]) [[id number]]    Luz Thompson  is a 69 year old  (1950), admitted to the above facility from  [facility]. Hospital stay 8/26/19 through 8/31/19..  Admitted to this facility for  rehab, medical management and nursing care.    HPI information obtained from: patient report and [facility] chart review.   Brief Summary of Hospital Course: treated for pyelonephritis and DERREK.   MEDICATION CHANGES: Vantin x 13 days, clotrimazole lozge PRN for thrush, Envanz IV x 10 days,  Fiber packet, NPH 10 units in AM.   RECOMMENDED FOLLOW UP: Dr. Vasquez of endocrinology.   Updates on Status Since Skilled nursing Admission:  9/4  Diabetic diet. 2.  Discontinue NPH. 3.  Start Linagliptin 5 mg po every day.  Dx: DM2 4.  Change dates for Cefpodoxime to 9/12-9/25.     ALLERGIES: Fluconazole; Azithromycin; Benadryl [diphenhydramine hcl]; Cellcept; Ciprofloxacin; Codeine; Diphenhydramine; Doxycycline; Lansoprazole; Levaquin [levofloxacin]; Lisinopril; Methotrexate; Morphine sulfate; Mycophenolate; Simvastatin; Cephalexin; Penicillin g; Tolectin [nsaids]; and Tramadol  Past Medical, Surgical, Family and Social History reviewed and updated in EPIC.  Medication list and progress notes reviewed in nursing home electronic health record     REVIEW OF SYSTEMS:   4 point ROS including Respiratory, CV, GI and , other than that noted in the HPI,  is negative    PHYSICAL EXAM:  /65   Pulse 62   Temp 97.9  F (36.6  C)   Resp 16   Wt 83.2 kg (183 lb 6.4 oz)   LMP 06/01/1988 (Approximate)   SpO2 92%   BMI 30.52 kg/m    GENERAL APPEARANCE:  Alert, in no distress  RESP:  respiratory effort and  palpation of chest normal, no respiratory distress, lungs sounds clear  CV:  Palpation and auscultation of heart done , regular rate and rhythm, no murmur, rub, or gallop, edema none  M/S:  Ambulates ind with walker, no tenderness or swelling of the joints   SKIN:  Inspection and palpation of skin and subcutaneous tissue at baseline  PSYCH:  insight and judgement, memory seems intact, affect and mood normal    ASSESSMENT/PLAN:  Physical deconditioning  Admitted to the TCU for therapy following hospital stay. States she just got the ok to be up in her room on her own.   Will continue with physical therapy and Occupational Therapy.     Escherichia coli sepsis (H)  Currently on IV antibiotics through 9/11.  And then should start Cefpodoxime PO 9/12 through 9/25.  9/4 Adjusted timing of p.o. antibiotic to be given after IV antibiotic completed.    Liver replaced by transplant (H)  Elevated liver labs during hospital stay.  Improved.  Continue prior to admission medications and immunosuppression.    CKD (chronic kidney disease) stage 3, GFR 30-59 ml/min (H)  Creatinine   Date Value Ref Range Status   08/30/2019 1.53 (H) 0.52 - 1.04 mg/dL Final   Continue to keep kidney function in mind with illness and medication changes.    Type 2 diabetes mellitus with stage 3 chronic kidney disease, without long-term current use of insulin (H)  Patient being followed by endocrinologist.  They had been discussing starting linagliptin for her diabetes.  NPH insulin was started in the hospital.  Patient relays that she thinks she would be able to do insulin at home but really does not want to.  Relayed to her that we may need to continue insulin during the short-term but likely be able to transition off.  Fasting blood sugars in the 120s. mealtime blood sugars 200.  Patient also relays that she is not happy with her diet.  Currently on a regular diet.   -Okay to start linagliptin 5 mg once daily  -Discontinue NPH  -Follow-up next  week  -Okay for diabetic diet  9/9 blood sugars last 3 days:  - 127 and  - 215. Continue current plan of care.     Chronic atrial fibrillation (H)  Continues on prior to admission meds of metoprolol and apixaban    Recurrent major depressive disorder, in full remission (H)  Denies concerns at this time.  From chart review it looks like sertraline was recently restarted.    Coccidioidomycotic pneumonitis (H)  Continues on VFEND.  She plans to go back to [place] soon.    Postoperative hypothyroidism  Continue outpatient regimen of 150 mcg alternating with 175 mcg.    Electronically signed by:  Samira HILL CNP    Orders written by provider at facility   1. Discontinue benefiber

## 2019-09-11 ENCOUNTER — NURSING HOME VISIT (OUTPATIENT)
Dept: GERIATRICS | Facility: CLINIC | Age: 69
End: 2019-09-11
Payer: MEDICARE

## 2019-09-11 VITALS
HEART RATE: 65 BPM | SYSTOLIC BLOOD PRESSURE: 118 MMHG | WEIGHT: 183.4 LBS | RESPIRATION RATE: 16 BRPM | TEMPERATURE: 98 F | DIASTOLIC BLOOD PRESSURE: 76 MMHG | BODY MASS INDEX: 30.52 KG/M2 | OXYGEN SATURATION: 98 %

## 2019-09-11 DIAGNOSIS — Z94.4 LIVER REPLACED BY TRANSPLANT (H): ICD-10-CM

## 2019-09-11 DIAGNOSIS — E11.22 TYPE 2 DIABETES MELLITUS WITH STAGE 3 CHRONIC KIDNEY DISEASE, WITHOUT LONG-TERM CURRENT USE OF INSULIN (H): ICD-10-CM

## 2019-09-11 DIAGNOSIS — A41.51 ESCHERICHIA COLI SEPSIS (H): ICD-10-CM

## 2019-09-11 DIAGNOSIS — R53.81 PHYSICAL DECONDITIONING: ICD-10-CM

## 2019-09-11 DIAGNOSIS — N18.30 TYPE 2 DIABETES MELLITUS WITH STAGE 3 CHRONIC KIDNEY DISEASE, WITHOUT LONG-TERM CURRENT USE OF INSULIN (H): ICD-10-CM

## 2019-09-11 PROCEDURE — 99305 1ST NF CARE MODERATE MDM 35: CPT | Performed by: FAMILY MEDICINE

## 2019-09-13 ENCOUNTER — DISCHARGE SUMMARY NURSING HOME (OUTPATIENT)
Dept: GERIATRICS | Facility: CLINIC | Age: 69
End: 2019-09-13
Payer: MEDICARE

## 2019-09-13 VITALS
BODY MASS INDEX: 30.52 KG/M2 | DIASTOLIC BLOOD PRESSURE: 62 MMHG | RESPIRATION RATE: 16 BRPM | TEMPERATURE: 97.1 F | WEIGHT: 183.4 LBS | SYSTOLIC BLOOD PRESSURE: 105 MMHG | OXYGEN SATURATION: 96 % | HEART RATE: 68 BPM

## 2019-09-13 DIAGNOSIS — N18.30 CKD (CHRONIC KIDNEY DISEASE) STAGE 3, GFR 30-59 ML/MIN (H): ICD-10-CM

## 2019-09-13 DIAGNOSIS — F33.42 RECURRENT MAJOR DEPRESSIVE DISORDER, IN FULL REMISSION (H): ICD-10-CM

## 2019-09-13 DIAGNOSIS — B38.2: ICD-10-CM

## 2019-09-13 DIAGNOSIS — Z94.4 LIVER REPLACED BY TRANSPLANT (H): ICD-10-CM

## 2019-09-13 DIAGNOSIS — E11.22 TYPE 2 DIABETES MELLITUS WITH STAGE 3 CHRONIC KIDNEY DISEASE, WITHOUT LONG-TERM CURRENT USE OF INSULIN (H): ICD-10-CM

## 2019-09-13 DIAGNOSIS — N18.30 TYPE 2 DIABETES MELLITUS WITH STAGE 3 CHRONIC KIDNEY DISEASE, WITHOUT LONG-TERM CURRENT USE OF INSULIN (H): ICD-10-CM

## 2019-09-13 DIAGNOSIS — E89.0 POSTOPERATIVE HYPOTHYROIDISM: ICD-10-CM

## 2019-09-13 DIAGNOSIS — I48.20 CHRONIC ATRIAL FIBRILLATION (H): ICD-10-CM

## 2019-09-13 DIAGNOSIS — R53.81 PHYSICAL DECONDITIONING: Primary | ICD-10-CM

## 2019-09-13 DIAGNOSIS — A41.51 ESCHERICHIA COLI SEPSIS (H): ICD-10-CM

## 2019-09-13 PROCEDURE — 99316 NF DSCHRG MGMT 30 MIN+: CPT | Performed by: NURSE PRACTITIONER

## 2019-09-13 NOTE — PROGRESS NOTES
[facility]    Chief Complaint   Patient presents with     Discharge Summary Nursing Home       [facility] Record Number:  [id number]  Place of Service where encounter took place[facility]  [facility] ([facility]) [[id number]]    HPI:    Luz Thompson is a 69 year old  (1950), who is being seen today for an episodic care visit.  HPI information obtained from: facility chart records, patient report and [facility] chart review.Today's concern is: Patient is comfortable and pleasant. Patient states she is feeling much better today and her blood sugar and blood pressure have been better than when she was admitted. She reports intermittent sharp shooting right knee pain since 2012 which has affected her gait, now uses cane for support during mobility. When asked what she uses for the knee pain, she states icy hot ointment. Followed by PT and OT.      ALLERGIES: Fluconazole; Azithromycin; Benadryl [diphenhydramine hcl]; Cellcept; Ciprofloxacin; Codeine; Diphenhydramine; Doxycycline; Lansoprazole; Levaquin [levofloxacin]; Lisinopril; Methotrexate; Morphine sulfate; Mycophenolate; Simvastatin; Cephalexin; Penicillin g; Tolectin [nsaids]; and Tramadol  Past Medical, Surgical, Family and Social History reviewed and updated in [facility].    Current Outpatient Medications   Medication Sig Dispense Refill     acetaminophen (TYLENOL) 325 MG tablet Take 2 tablets (650 mg) by mouth every 6 hours as needed for mild pain or fever       alirocumab (PRALUENT) 150 MG/ML injectable pen Inject 1 mL (150 mg) Subcutaneous every 14 days 6 mL 3     calcitRIOL (ROCALTROL) 0.5 MCG capsule Take 1 capsule (0.5 mcg) by mouth daily 90 capsule 3     cefpodoxime (VANTIN) 200 MG tablet Take 1 tablet (200 mg) by mouth 2 times daily for 13 days       ciprofloxacin-dexamethasone (CIPRODEX) 0.3-0.1 % otic suspension Place 4 drops Into the left ear three times a week 5.6 mL 6     clotrimazole (MYCELEX) 10 MG LOZG lozenge Place 1  lozenge (1 Harinder) inside cheek 3 times daily as needed (for thrush)       ELIQUIS 2.5 MG tablet Take 1 tablet (2.5 mg) by mouth 2 times daily 180 tablet 3     estradiol (ESTRACE) 0.1 MG/GM vaginal cream Place 2 g vaginally twice a week       ezetimibe (ZETIA) 10 MG tablet Take 1 tablet (10 mg) by mouth daily 90 tablet 3     ferrous gluconate (FERGON) 324 (38 Fe) MG tablet Take 1 tablet (324 mg) by mouth 3 times daily (with meals) 90 tablet 0     folic acid (FOLVITE) 1 MG tablet Take 1 tablet (1 mg) by mouth daily 100 tablet 3     furosemide (LASIX) 20 MG tablet Take 1 tablet (20 mg) by mouth daily 90 tablet 3     glucose 40 % (400 mg/mL) gel Take 15-30 g by mouth every 15 minutes as needed for low blood sugar       Guar Gum (FIBER MODULAR, NUTRISOURCE FIBER,) packet 1 packet by Oral or Feeding Tube route daily       hydrALAZINE (APRESOLINE) 25 MG tablet Take 1 tablet (25 mg) by mouth 3 times daily as needed (For SBP >140)       hypromellose (ARTIFICIAL TEARS) 0.4 % SOLN ophthalmic solution Apply 1 drop to eye every hour as needed for dry eyes       levothyroxine (SYNTHROID/LEVOTHROID) 150 MCG tablet Take 1 tablet (150 mcg) by mouth every other day       levothyroxine (SYNTHROID/LEVOTHROID) 175 MCG tablet Take 1 tablet (175 mcg) by mouth every other day       linagliptin (TRADJENTA) 5 MG TABS tablet Take 1 tablet (5 mg) by mouth daily 90 tablet 3     losartan (COZAAR) 25 MG tablet Take 1 tablet (25 mg) by mouth daily       meclizine (ANTIVERT) 25 MG tablet Take 1 tablet (25 mg) by mouth as needed for dizziness or other (migraines) 30 tablet 11     metoprolol succinate ER (TOPROL-XL) 50 MG 24 hr tablet Take 1 tablet (50 mg) by mouth daily 90 tablet 3     metroNIDAZOLE (METROGEL) 0.75 % external gel Apply topically 2 times daily Put on face 45 g 3     Multiple Vitamins-Minerals (PRESERVISION AREDS 2) CAPS Take 1 capsule by mouth 2 times daily       omega-3 acid ethyl esters (LOVAZA) 1 g capsule Take 1 capsule (1 g) by  mouth 2 times daily 180 capsule 3     predniSONE (DELTASONE) 10 MG tablet Take 1 tablet (10 mg) by mouth daily       RAPAMUNE (BRAND) 1 MG tablet Take 1 tablet (1 mg) by mouth every other day 45 tablet 3     sennosides (SENOKOT) 8.6 MG tablet Take 2 tablets by mouth 2 times daily as needed for constipation       sertraline (ZOLOFT) 50 MG tablet Take 1 tablet (50 mg) by mouth At Bedtime       SUMAtriptan (IMITREX) 50 MG tablet Take 1 tablet (50 mg) by mouth at onset of headache for migraine repeat after 2 hours if needed. 30 tablet 3     ursodiol (ACTIGALL) 250 MG tablet Take 1 tablet (250 mg) by mouth 2 times daily       voriconazole (VFEND) 200 MG tablet Take 1 tablet (200 mg) by mouth 2 times daily 180 tablet 0     blood glucose (ACCU-CHEK GUIDE) test strip Use to test blood sugar 2 times daily or as directed. 100 strip 11     blood glucose monitoring (ACCU-CHEK FASTCLIX) lancets Use to test blood sugar 2 times daily or as directed. 102 each 11     blood glucose monitoring (NO BRAND SPECIFIED) meter device kit Use to test blood sugar 2times daily or as directed. 1 kit 0     blood glucose monitoring (NO BRAND SPECIFIED) test strip Use to test blood sugar 2 times daily, before breakfast and before bedtime 200 each 3     clotrimazole (LOTRIMIN) 1 % external cream Apply topically 2 times daily as needed Reported on 4/25/2017       insulin isophane human (HUMULIN N PEN) 100 UNIT/ML injection Inject 10 Units Subcutaneous every morning (before breakfast)       order for DME Equipment being ordered: right brace with thumb 1 Device 0     Wheat Dextrin (BENEFIBER) POWD 2 teaspoons daily       Medications reviewed:  Medications reconciled to facility chart and changes were made to reflect current medications as identified as above med list. Below are the changes that were made:   Medications stopped since last EPIC medication reconciliation:   Medications Discontinued During This Encounter   Medication Reason      triamcinolone (KENALOG-40) injection 40 mg        Medications started since last [facility] medication reconciliation:  No orders of the defined types were placed in this encounter.        REVIEW OF SYSTEMS:  CONSTITUTIONAL:  negative, EYES:  glasses or contacts, ENT:  [place] and full dentures, CV:  negative, RESPIRATORY: negative, :  frequency, GI:  Fair appetite, NEURO:  gait disturbance, PSYCH: negative, MUSCULOSKELETAL: right knee pain and gait instability, ENDO: negative and SKIN: negative    Physical Exam:  /62   Pulse 68   Temp 97.1  F (36.2  C)   Resp 16   Wt 83.2 kg (183 lb 6.4 oz)   LMP 06/01/1988 (Approximate)   SpO2 96%   BMI 30.52 kg/m    GENERAL APPEARANCE:  Alert, oriented, in no distress, appears healthy.  ENT:  Mouth and posterior oropharynx normal, moist mucous membranes, [place]  RESP:  Respiratory effort and palpation of chest normal, lungs clear to auscultation , no respiratory distress  CV:  Palpation and auscultation of heart done , regular rate and rhythm, no murmur, rub, or gallop, no edema, +2 pedal pulses  ABDOMEN:  Normal bowel sounds, soft, nontender, no hepatosplenomegaly or other masses  M/S:   Intermittent sharp shooting right knee pain. Unteady gait. Uses cane for support      CBC RESULTS:   Recent Labs   Lab Test 08/30/19 0657 08/29/19  0544   WBC 6.0 7.3   RBC 3.44* 3.63*   HGB 9.2* 9.6*   HCT 30.8* 32.8*   MCV 90 90   MCH 26.7 26.4*   MCHC 29.9* 29.3*   RDW 15.3* 15.5*    255       Last Basic Metabolic Panel:  Recent Labs   Lab Test 08/30/19 0657 08/29/19  0544    138   POTASSIUM 4.4 4.5   CHLORIDE 109 110*   KIELY 8.8 8.8   CO2 25 21   BUN 37* 43*   CR 1.53* 1.62*   * 155*       Liver Function Studies -   Recent Labs   Lab Test 08/30/19 0657 08/29/19  0544   PROTTOTAL 6.4* 6.6*   ALBUMIN 2.3* 2.4*   BILITOTAL 0.2 0.2   ALKPHOS 202* 218*   AST 20 41   ALT 72* 94*       TSH   Date Value Ref Range Status   05/20/2019 12.11 (H) 0.40 - 4.00 mU/L Final    10/16/2018 18.39 (H) 0.40 - 4.00 mU/L Final   ]    Lab Results   Component Value Date    A1C 6.4 10/16/2018    A1C 6.8 05/18/2018             Electronically signed by  ARNOLD GONZALES, STUDENT NP

## 2019-09-16 NOTE — PROGRESS NOTES
[facility] DISCHARGE SUMMARY    PATIENT'S NAME: Luz Thompson  YOB: 1950  MEDICAL RECORD NUMBER:  [id number]  Place of Service where encounter took place:  [facility] ([facility]) [[id number]]    PRIMARY CARE PROVIDER AND CLINIC RESPONSIBLE AFTER TRANSFER: Jennifer Barraza [id number] [id number]    [facility] [place] [facility]     Transferring providers: Samira Hunt NP, Kanchan Kirkland MD  Recent Hospitalization/ED:  [facility]. Hospital stay 8/26/19 through 8/31/19.  Date of SNF Admission: 8/26  Date of SNF (anticipated) Discharge: 9/16  Discharged to: previous independent home  Cognitive Scores: BIMS 15/15; ACL 4.6/5.8  Physical Function: Ambulating 200 ft with walker  DME: Walker    CODE STATUS/ADVANCE DIRECTIVES DISCUSSION:  Full Code  ALLERGIES: Fluconazole; Azithromycin; Benadryl [diphenhydramine hcl]; Cellcept; Ciprofloxacin; Codeine; Diphenhydramine; Doxycycline; Lansoprazole; Levaquin [levofloxacin]; Lisinopril; Methotrexate; Morphine sulfate; Mycophenolate; Simvastatin; Cephalexin; Penicillin g; Tolectin [nsaids]; and Tramadol    DISCHARGE DIAGNOSIS/NURSING FACILITY COURSE:   Brief Summary of Hospital Course: treated for pyelonephritis and DERREK.   MEDICATION CHANGES: Vantin x 13 days, clotrimazole lozge PRN for thrush, Envanz IV x 10 days,  Fiber packet, NPH 10 units in AM.   RECOMMENDED FOLLOW UP: Dr. Vasquez of endocrinology.   Updates on Status Since Skilled nursing Admission:  9/4  Diabetic diet. 2.  Discontinue NPH. 3.  Start Linagliptin 5 mg po every day.  Dx: DM2 4.  Change dates for Cefpodoxime to 9/12-9/25.  9/9 discontinue benifiber due to sticky frequent stool ing.   9/11 MD visit    Physical deconditioning  Admitted to the TCU for therapy following hospital stay. States she just got the ok to be up in her room on her own.   Will continue with physical therapy and Occupational Therapy.      Escherichia coli sepsis  (H)  completed IV antibiotics through 9/11.  And then should start Cefpodoxime PO 9/12 through 9/25.  9/4 Adjusted timing of p.o. antibiotic to be given after IV antibiotic completed. Nursing will send remaining supply of abx.      Liver replaced by transplant (H)  Elevated liver labs during hospital stay.  Improved.  Continue prior to admission medications and immunosuppression.     CKD (chronic kidney disease) stage 3, GFR 30-59 ml/min (H)        Creatinine   Date Value Ref Range Status   08/30/2019 1.53 (H) 0.52 - 1.04 mg/dL Final   Continue to keep kidney function in mind with illness and medication changes.     Type 2 diabetes mellitus with stage 3 chronic kidney disease, without long-term current use of insulin (H)  Patient being followed by endocrinologist.  They had been discussing starting linagliptin for her diabetes.  NPH insulin was started in the hospital.  Patient relays that she thinks she would be able to do insulin at home but really does not want to.  Relayed to her that we may need to continue insulin during the short-term but likely be able to transition off.  Fasting blood sugars in the 120s. mealtime blood sugars 200.  Patient also relays that she is not happy with her diet.  Currently on a regular diet.   -Okay to start linagliptin 5 mg once daily  -Discontinue NPH  -Follow-up next week  -Okay for diabetic diet  9/9 blood sugars last 3 days:  - 127 and  - 215. Continue current plan of care.      Chronic atrial fibrillation (H)  Continues on prior to admission meds of metoprolol and apixaban     Recurrent major depressive disorder, in full remission (H)  Denies concerns at this time.  From chart review it looks like sertraline was recently restarted.     Coccidioidomycotic pneumonitis (H)  Continues on VFEND.  She plans to go back to [place] soon.     Postoperative hypothyroidism  Continue outpatient regimen of 150 mcg alternating with 175 mcg.      PAST MEDICAL HISTORY:  has a past  medical history of Anemia of other chronic disease (10/17/2011), Anxiety, Bladder infection, chronic (4/4/2012), CKD (chronic kidney disease) stage 3, GFR 30-59 ml/min (H) (4/4/2012), 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), CVA (cerebral vascular accident) (H) (2001), Diverticulosis of sigmoid colon (12/21/2013), 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, H/O esophageal varices, Hearing loss, Heart murmur (4/4/2012), History of DVT (deep vein thrombosis), History of [facility] fever, History of thyroid cancer (9/25/2012), Hyperlipidemia (4/10/2012), Hyperlipidemia LDL goal <70, Hypertension goal BP (blood pressure) < 140/80 (11/06/2013), Hypertriglyceridemia, Liver replaced by transplant (H) (10/17/2011), Macular degeneration, Migraines (4/4/2012), Nonsenile cataract, Osteoarthritis of right knee (8/2/2012), Osteoporosis (4/20/2012), Paroxysmal A-fib (H) (6/13/2017), Postablative hypothyroidism (8/13/2012), Primary biliary cirrhosis (H), Sjogren's syndrome (H), Type 2 diabetes, HbA1c goal < 7% (H), Unspecified glaucoma(365.9), Vitamin D deficiency (10/1/2012), and 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    DISCHARGE MEDICATIONS:  Current Outpatient Medications   Medication Sig Dispense Refill     acetaminophen (TYLENOL) 325 MG tablet Take 2 tablets (650 mg) by mouth every 6 hours as needed for mild pain or fever       alirocumab (PRALUENT) 150 MG/ML injectable pen Inject 1 mL (150 mg) Subcutaneous every 14 days 6 mL 3     calcitRIOL (ROCALTROL) 0.5 MCG capsule Take 1 capsule (0.5 mcg) by mouth daily 90 capsule 3     cefpodoxime (VANTIN) 200 MG tablet Take 1 tablet (200 mg) by mouth 2 times daily for 13 days       ciprofloxacin-dexamethasone (CIPRODEX) 0.3-0.1 % otic suspension Place 4 drops Into the left ear three times a week 5.6 mL 6     clotrimazole (MYCELEX) 10 MG LOZG lozenge Place 1 lozenge (1 Harinder) inside cheek 3 times daily as needed (for thrush)       ELIQUIS 2.5 MG tablet Take 1 tablet (2.5 mg) by mouth 2 times daily 180 tablet  3     estradiol (ESTRACE) 0.1 MG/GM vaginal cream Place 2 g vaginally twice a week       ezetimibe (ZETIA) 10 MG tablet Take 1 tablet (10 mg) by mouth daily 90 tablet 3     ferrous gluconate (FERGON) 324 (38 Fe) MG tablet Take 1 tablet (324 mg) by mouth 3 times daily (with meals) 90 tablet 0     folic acid (FOLVITE) 1 MG tablet Take 1 tablet (1 mg) by mouth daily 100 tablet 3     furosemide (LASIX) 20 MG tablet Take 1 tablet (20 mg) by mouth daily 90 tablet 3     glucose 40 % (400 mg/mL) gel Take 15-30 g by mouth every 15 minutes as needed for low blood sugar       Guar Gum (FIBER MODULAR, NUTRISOURCE FIBER,) packet 1 packet by Oral or Feeding Tube route daily       hydrALAZINE (APRESOLINE) 25 MG tablet Take 1 tablet (25 mg) by mouth 3 times daily as needed (For SBP >140)       hypromellose (ARTIFICIAL TEARS) 0.4 % SOLN ophthalmic solution Apply 1 drop to eye every hour as needed for dry eyes       levothyroxine (SYNTHROID/LEVOTHROID) 150 MCG tablet Take 1 tablet (150 mcg) by mouth every other day       levothyroxine (SYNTHROID/LEVOTHROID) 175 MCG tablet Take 1 tablet (175 mcg) by mouth every other day       linagliptin (TRADJENTA) 5 MG TABS tablet Take 1 tablet (5 mg) by mouth daily 90 tablet 3     losartan (COZAAR) 25 MG tablet Take 1 tablet (25 mg) by mouth daily       meclizine (ANTIVERT) 25 MG tablet Take 1 tablet (25 mg) by mouth as needed for dizziness or other (migraines) 30 tablet 11     metoprolol succinate ER (TOPROL-XL) 50 MG 24 hr tablet Take 1 tablet (50 mg) by mouth daily 90 tablet 3     metroNIDAZOLE (METROGEL) 0.75 % external gel Apply topically 2 times daily Put on face 45 g 3     Multiple Vitamins-Minerals (PRESERVISION AREDS 2) CAPS Take 1 capsule by mouth 2 times daily       omega-3 acid ethyl esters (LOVAZA) 1 g capsule Take 1 capsule (1 g) by mouth 2 times daily 180 capsule 3     predniSONE (DELTASONE) 10 MG tablet Take 1 tablet (10 mg) by mouth daily       RAPAMUNE (BRAND) 1 MG tablet Take 1  tablet (1 mg) by mouth every other day 45 tablet 3     sennosides (SENOKOT) 8.6 MG tablet Take 2 tablets by mouth 2 times daily as needed for constipation       sertraline (ZOLOFT) 50 MG tablet Take 1 tablet (50 mg) by mouth At Bedtime       SUMAtriptan (IMITREX) 50 MG tablet Take 1 tablet (50 mg) by mouth at onset of headache for migraine repeat after 2 hours if needed. 30 tablet 3     ursodiol (ACTIGALL) 250 MG tablet Take 1 tablet (250 mg) by mouth 2 times daily       voriconazole (VFEND) 200 MG tablet Take 1 tablet (200 mg) by mouth 2 times daily 180 tablet 0     blood glucose (ACCU-CHEK GUIDE) test strip Use to test blood sugar 2 times daily or as directed. 100 strip 11     blood glucose monitoring (ACCU-CHEK FASTCLIX) lancets Use to test blood sugar 2 times daily or as directed. 102 each 11     blood glucose monitoring (NO BRAND SPECIFIED) meter device kit Use to test blood sugar 2times daily or as directed. 1 kit 0     blood glucose monitoring (NO BRAND SPECIFIED) test strip Use to test blood sugar 2 times daily, before breakfast and before bedtime 200 each 3     clotrimazole (LOTRIMIN) 1 % external cream Apply topically 2 times daily as needed Reported on 4/25/2017       insulin isophane human (HUMULIN N PEN) 100 UNIT/ML injection Inject 10 Units Subcutaneous every morning (before breakfast)       order for DME Equipment being ordered: right brace with thumb 1 Device 0     Wheat Dextrin (BENEFIBER) POWD 2 teaspoons daily         MEDICATION CHANGES/RATIONALE:   See above     ROS:    4 point ROS including Respiratory, CV, GI and , other than that noted in the HPI,  is negative    Physical Exam:   Vitals: /62   Pulse 68   Temp 97.1  F (36.2  C)   Resp 16   Wt 83.2 kg (183 lb 6.4 oz)   LMP 06/01/1988 (Approximate)   SpO2 96%   BMI 30.52 kg/m    BMI= Body mass index is 30.52 kg/m .  General: alert in no distress.     DISCHARGE PLAN:  No therapy.   Patient instructed to follow-up with:  PCP in 7  days      Current [place] scheduled appointments:  Future Appointments   Date Time Provider Department Center   9/19/2019 10:40 AM Jennifer Barraza MD [facility] referral needed and placed by this provider: No    Pending labs: none  SNF labs: none  Discharge Treatments:  none  - Ok to discharge to home with current medications and treatments  - Follow up with Primary care provider in 1 week  - Scripts sent to pharmacy: none   - Scripts written: none     TOTAL DISCHARGE TIME:   Greater than 30 minutes  Electronically signed by:  Samira Hunt NP

## 2019-09-19 ENCOUNTER — OFFICE VISIT (OUTPATIENT)
Dept: FAMILY MEDICINE | Facility: CLINIC | Age: 69
End: 2019-09-19
Payer: MEDICARE

## 2019-09-19 VITALS
OXYGEN SATURATION: 94 % | DIASTOLIC BLOOD PRESSURE: 66 MMHG | SYSTOLIC BLOOD PRESSURE: 116 MMHG | BODY MASS INDEX: 28.04 KG/M2 | HEIGHT: 68 IN | WEIGHT: 185 LBS | TEMPERATURE: 98.2 F | HEART RATE: 75 BPM | RESPIRATION RATE: 16 BRPM

## 2019-09-19 DIAGNOSIS — D84.9 IMMUNOSUPPRESSION (H): ICD-10-CM

## 2019-09-19 DIAGNOSIS — F33.0 MILD RECURRENT MAJOR DEPRESSION (H): ICD-10-CM

## 2019-09-19 DIAGNOSIS — E11.22 TYPE 2 DIABETES MELLITUS WITH STAGE 3 CHRONIC KIDNEY DISEASE, WITHOUT LONG-TERM CURRENT USE OF INSULIN (H): Primary | ICD-10-CM

## 2019-09-19 DIAGNOSIS — N18.30 CKD (CHRONIC KIDNEY DISEASE) STAGE 3, GFR 30-59 ML/MIN (H): ICD-10-CM

## 2019-09-19 DIAGNOSIS — Z79.899 HIGH RISK MEDICATION USE: ICD-10-CM

## 2019-09-19 DIAGNOSIS — E89.0 POSTOPERATIVE HYPOTHYROIDISM: ICD-10-CM

## 2019-09-19 DIAGNOSIS — Z94.4 LIVER REPLACED BY TRANSPLANT (H): ICD-10-CM

## 2019-09-19 DIAGNOSIS — N18.30 TYPE 2 DIABETES MELLITUS WITH STAGE 3 CHRONIC KIDNEY DISEASE, WITHOUT LONG-TERM CURRENT USE OF INSULIN (H): Primary | ICD-10-CM

## 2019-09-19 DIAGNOSIS — R53.81 PHYSICAL DECONDITIONING: ICD-10-CM

## 2019-09-19 DIAGNOSIS — I10 HYPERTENSION GOAL BP (BLOOD PRESSURE) < 140/80: ICD-10-CM

## 2019-09-19 PROBLEM — C73 PAPILLARY THYROID CARCINOMA (H): Status: ACTIVE | Noted: 2019-09-19

## 2019-09-19 PROCEDURE — 99214 OFFICE O/P EST MOD 30 MIN: CPT | Performed by: FAMILY MEDICINE

## 2019-09-19 ASSESSMENT — MIFFLIN-ST. JEOR: SCORE: 1399.71

## 2019-09-19 ASSESSMENT — PAIN SCALES - GENERAL: PAINLEVEL: NO PAIN (0)

## 2019-09-19 NOTE — PROGRESS NOTES
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 calcified nodular opacities in the right lower lobe. Small  calcified granuloma in the lingula.  Heart and mediastinum: Central tracheobronchial tree is patent. Heart  size is normal. No pericardial effusion.  Moderate coronary artery and  aortic root calcification.  Thoracic vasculature: Limited assessment on this noncontrast exam.  Thoracic aorta main pulmonary artery are normal in caliber. Normal  branching pattern of the great vessels. Mild calcification of the  arch.  Lymph nodes: No mediastinal, hilar or axillary lymphadenopathy.  Thyroid: Postsurgical changes of thyroidectomy.    Liver: Postsurgical changes of liver transplant. No focal mass.  Gallbladder: Surgically absent.  Spleen: No suspicious splenic lesions.  Pancreas: Severe pancreatic atrophy and fatty infiltration.  Adrenal glands: No adrenal nodules.   Kidneys: Mild bilateral cortical atrophy. No calcified stones,  hydronephrosis or focal mass. No ureteral dilatation.  Bladder / Pelvic organs: Bladder is unremarkable. Uterus is atrophic.  No pelvic masses. Multiple pelvic phleboliths.  Bowel: No abnormally distended large or small bowel. The appendix is  surgically absent. Diverticulosis without  radiographic evidence of  diverticulitis.  Lymph nodes: No retroperitoneal, mesenteric, or pelvic  lymphadenopathy.  Peritoneum / Retroperitoneum: No free fluid or air within the abdomen.  Abdominal vasculature: Limited assessment on this noncontrast exam. No  abdominal aortic aneurysm.    Bones and soft tissues: Moderate to severe degenerative changes of the  spine. Multilevel disc space narrowing most prominent at the level of  the mid thorax. There are several subtle, chronic appearing wedge  deformities. No acute fracture or suspicious appearing osseous lesion.  Degenerative changes of the hips and shoulders. No acute fracture or  suspicious appearing osseous lesion. Small calcification noted along  the right anterior lateral body wall, series 4 image 157. Thoracic and  lumbar spinal reconstructions to be dictated separately. Please see  those reports for full details.      Impression    IMPRESSION:   1. No acute intra-abdominal or intrathoracic findings. No evidence of  hydronephrosis or renal stone. Pyelonephritis cannot be excluded on  this noncontrast exam.  2. Chronic appearing partially calcified nodular opacities in the  right lower lobe, unchanged as compared to chest CT dated 5/21/2019.  3. Postsurgical changes of liver transplant. No suspicious hepatic  lesions.  4. Diverticulosis without evidence of acute diverticulitis.  5. Thoracic and lumbar reconstructions to be dictated separately.  Please see those reports for full details.    I have personally reviewed the examination and initial interpretation  and I agree with the findings.    RYAN CARBAJAL MD   US Liver Transplant    Narrative    EXAMINATION: US LIVER TRANSPLANT, 8/27/2019 8:45 AM     COMPARISON: CT abdomen/pelvis 8/27/2019. Transplant ultrasound  5/13/2016    HISTORY: History of transplant liver in 2011. Now with urinary tract  infection and elevated LFTs.    TECHNIQUE:  Gray-scale, color Doppler and spectral flow analysis.    FINDINGS:    There is no ascites.    Liver:   The liver demonstrates normal homogeneous echotexture. No  evidence of a focal hepatic mass.     Bile Ducts: No intrahepatic biliary dilatation. The common bile duct  is not well-visualized.    Gallbladder: The gallbladder is surgically absent.    Kidneys:   Right kidney:  The right kidney demonstrates increased parenchymal  echogenicity with cortical thinning. No focal mass or hydronephrosis.    10.7 cm in long axis dimension.  Left kidney:  The left kidney demonstrates increased parenchymal  echogenicity with cortical thinning. No focal mass or hydronephrosis.    9.6 cm in long axis dimension.    Pancreas: This was portions of the head and body the pancreas are  normal.    Spleen:  The spleen is normal in size, measuring 9.3 cm.    Visualized portions of the aorta are unremarkable.    LIVER DOPPLER:  Splenic vein:  Patent continuous normal antegrade direction flow  towards the liver, 21 cm/sec.  Extrahepatic portal vein:  Patent continuous antegrade flow, 13  cm/sec.  Portal vein at anastomosis: Patent continuous antegrade flow, 11  cm/sec.  Intrahepatic portal vein:  Patent continuous antegrade flow, 15  cm/sec.  Right portal vein flow is antegrade, measuring 31 cm/sec.  Left portal vein flow is antegrade, measuring 11 cm/sec.    Inferior vena cava: patent with flow toward the heart throughout..  IVC above anastomosis:  52 cm/sec.  IVC at anastomosis:  57 cm/sec.  Intrahepatic IVC:  23 cm/sec.  Extrahepatic IVC:  20 cm/sec.    Right, mid, left hepatic veins: Patent with flow towards the inferior  vena cava.    Extrahepatic hepatic artery: Low resistance waveform with flow towards  the liver. 104 cm/sec with resistive index 0.75.  Right hepatic artery: 111 cm/sec with resistive index 0.70.  Left hepatic artery: 43 cm/sec with resistive index 0.70.      Impression    Impression:   1. Normal grayscale and Doppler evaluation of the transplant liver  although the common bile was not  visualized.  2. Echogenic kidneys compatible with history of medical renal disease.    I have personally reviewed the examination and initial interpretation  and I agree with the findings.    ZACHARIAH AHN, DO   XR Chest Port 1 View    Narrative    XR CHEST PORT 1 VW8/31/2019 2:30 PM    INDICATION: PICC placement    COMPARISON:  CT 8/27/2019, plain film 8/26/2019    FINDINGS: AP view of the chest. Right arm PICC projects with tip at  the high right atrium. Cardiomediastinal silhouette is within normal  limits. Chronic calcified nodules in the right lung base. No pleural  effusion or pneumothorax. Operative changes in the right upper  quadrant with clips. Upper abdomen is otherwise unremarkable. No acute  osseous lesions.      Impression    IMPRESSION: Right arm PICC projects with tip at the high right atrium.    I have personally reviewed the examination and initial interpretation  and I agree with the findings.    ALEXANDRA GUTIERREZ MD   Glucose by meter   Result Value Ref Range    Glucose 235 (H) 70 - 99 mg/dL   Comprehensive metabolic panel   Result Value Ref Range    Sodium 135 133 - 144 mmol/L    Potassium 3.6 3.4 - 5.3 mmol/L    Chloride 100 94 - 109 mmol/L    Carbon Dioxide 26 20 - 32 mmol/L    Anion Gap 9 3 - 14 mmol/L    Glucose 209 (H) 70 - 99 mg/dL    Urea Nitrogen 41 (H) 7 - 30 mg/dL    Creatinine 2.38 (H) 0.52 - 1.04 mg/dL    GFR Estimate 20 (L) >60 mL/min/[1.73_m2]    GFR Estimate If Black 23 (L) >60 mL/min/[1.73_m2]    Calcium 8.1 (L) 8.5 - 10.1 mg/dL    Bilirubin Total 0.5 0.2 - 1.3 mg/dL    Albumin 2.4 (L) 3.4 - 5.0 g/dL    Protein Total 7.0 6.8 - 8.8 g/dL    Alkaline Phosphatase 259 (H) 40 - 150 U/L     (H) 0 - 50 U/L     (H) 0 - 45 U/L   CBC with platelets differential   Result Value Ref Range    WBC 12.5 (H) 4.0 - 11.0 10e9/L    RBC Count 3.85 3.8 - 5.2 10e12/L    Hemoglobin 10.5 (L) 11.7 - 15.7 g/dL    Hematocrit 33.0 (L) 35.0 - 47.0 %    MCV 86 78 - 100 fl    MCH 27.3 26.5 -  33.0 pg    MCHC 31.8 31.5 - 36.5 g/dL    RDW 15.8 (H) 10.0 - 15.0 %    Platelet Count 226 150 - 450 10e9/L    Diff Method Automated Method     % Neutrophils 86.5 %    % Lymphocytes 6.2 %    % Monocytes 5.4 %    % Eosinophils 0.2 %    % Basophils 0.3 %    % Immature Granulocytes 1.4 %    Nucleated RBCs 0 0 /100    Absolute Neutrophil 10.8 (H) 1.6 - 8.3 10e9/L    Absolute Lymphocytes 0.8 0.8 - 5.3 10e9/L    Absolute Monocytes 0.7 0.0 - 1.3 10e9/L    Absolute Eosinophils 0.0 0.0 - 0.7 10e9/L    Absolute Basophils 0.0 0.0 - 0.2 10e9/L    Abs Immature Granulocytes 0.2 0 - 0.4 10e9/L    Absolute Nucleated RBC 0.0    Lactic acid whole blood   Result Value Ref Range    Lactic Acid 1.8 0.7 - 2.0 mmol/L   CRP inflammation   Result Value Ref Range    CRP Inflammation 180.0 (H) 0.0 - 8.0 mg/L   Erythrocyte sedimentation rate auto   Result Value Ref Range    Sed Rate 78 (H) 0 - 30 mm/h   UA with Microscopic   Result Value Ref Range    Color Urine Yellow     Appearance Urine Slightly Cloudy     Glucose Urine Negative NEG^Negative mg/dL    Bilirubin Urine Negative NEG^Negative    Ketones Urine Negative NEG^Negative mg/dL    Specific Gravity Urine 1.015 1.003 - 1.035    Blood Urine Trace (A) NEG^Negative    pH Urine 6.5 5.0 - 7.0 pH    Protein Albumin Urine 300 (A) NEG^Negative mg/dL    Urobilinogen mg/dL Normal 0.0 - 2.0 mg/dL    Nitrite Urine Negative NEG^Negative    Leukocyte Esterase Urine Moderate (A) NEG^Negative    Source Midstream Urine     WBC Urine 12 (H) 0 - 5 /HPF    RBC Urine 12 (H) 0 - 2 /HPF    Bacteria Urine Few (A) NEG^Negative /HPF    Squamous Epithelial /HPF Urine 9 (H) 0 - 1 /HPF    Transitional Epi <1 0 - 1 /HPF    Mucous Urine Present (A) NEG^Negative /LPF    Hyaline Casts 1 0 - 2 /LPF    Amorphous Crystals Few (A) NEG^Negative /HPF   INR   Result Value Ref Range    INR 1.33 (H) 0.86 - 1.14   Partial thromboplastin time   Result Value Ref Range    PTT 38 (H) 22 - 37 sec   Lipase   Result Value Ref Range     Lipase 66 (L) 73 - 393 U/L   Lactic acid   Result Value Ref Range    Lactic Acid 0.5 (L) 0.7 - 2.0 mmol/L   Procalcitonin   Result Value Ref Range    Procalcitonin 3.15 ng/ml   CBC with platelets   Result Value Ref Range    WBC 9.2 4.0 - 11.0 10e9/L    RBC Count 3.18 (L) 3.8 - 5.2 10e12/L    Hemoglobin 8.4 (L) 11.7 - 15.7 g/dL    Hematocrit 28.9 (L) 35.0 - 47.0 %    MCV 91 78 - 100 fl    MCH 26.4 (L) 26.5 - 33.0 pg    MCHC 29.1 (L) 31.5 - 36.5 g/dL    RDW 15.9 (H) 10.0 - 15.0 %    Platelet Count 191 150 - 450 10e9/L   Comprehensive metabolic panel   Result Value Ref Range    Sodium 142 133 - 144 mmol/L    Potassium 3.6 3.4 - 5.3 mmol/L    Chloride 109 94 - 109 mmol/L    Carbon Dioxide 22 20 - 32 mmol/L    Anion Gap 10 3 - 14 mmol/L    Glucose 134 (H) 70 - 99 mg/dL    Urea Nitrogen 40 (H) 7 - 30 mg/dL    Creatinine 2.15 (H) 0.52 - 1.04 mg/dL    GFR Estimate 23 (L) >60 mL/min/[1.73_m2]    GFR Estimate If Black 26 (L) >60 mL/min/[1.73_m2]    Calcium 7.0 (L) 8.5 - 10.1 mg/dL    Bilirubin Total 0.3 0.2 - 1.3 mg/dL    Albumin 1.8 (L) 3.4 - 5.0 g/dL    Protein Total 5.4 (L) 6.8 - 8.8 g/dL    Alkaline Phosphatase 198 (H) 40 - 150 U/L     (H) 0 - 50 U/L    AST 87 (H) 0 - 45 U/L   Glucose by meter   Result Value Ref Range    Glucose 170 (H) 70 - 99 mg/dL   Glucose by meter   Result Value Ref Range    Glucose 199 (H) 70 - 99 mg/dL   Glucose by meter   Result Value Ref Range    Glucose 242 (H) 70 - 99 mg/dL   CBC with platelets   Result Value Ref Range    WBC 7.2 4.0 - 11.0 10e9/L    RBC Count 3.39 (L) 3.8 - 5.2 10e12/L    Hemoglobin 9.1 (L) 11.7 - 15.7 g/dL    Hematocrit 30.2 (L) 35.0 - 47.0 %    MCV 89 78 - 100 fl    MCH 26.8 26.5 - 33.0 pg    MCHC 30.1 (L) 31.5 - 36.5 g/dL    RDW 15.9 (H) 10.0 - 15.0 %    Platelet Count 225 150 - 450 10e9/L   Comprehensive metabolic panel   Result Value Ref Range    Sodium 139 133 - 144 mmol/L    Potassium 4.3 3.4 - 5.3 mmol/L    Chloride 108 94 - 109 mmol/L    Carbon Dioxide 22 20 -  32 mmol/L    Anion Gap 9 3 - 14 mmol/L    Glucose 165 (H) 70 - 99 mg/dL    Urea Nitrogen 43 (H) 7 - 30 mg/dL    Creatinine 1.85 (H) 0.52 - 1.04 mg/dL    GFR Estimate 27 (L) >60 mL/min/[1.73_m2]    GFR Estimate If Black 31 (L) >60 mL/min/[1.73_m2]    Calcium 7.7 (L) 8.5 - 10.1 mg/dL    Bilirubin Total 0.2 0.2 - 1.3 mg/dL    Albumin 2.0 (L) 3.4 - 5.0 g/dL    Protein Total 6.2 (L) 6.8 - 8.8 g/dL    Alkaline Phosphatase 207 (H) 40 - 150 U/L     (H) 0 - 50 U/L    AST 69 (H) 0 - 45 U/L   EBV DNA PCR Quantitative Whole Blood   Result Value Ref Range    EBV DNA Copies/mL 2,386 (A) EBVNEG^EBV DNA Not Detected [Copies]/mL    EBV DNA Log of Copies 3.4 (H) <2.7 [Log_copies]/mL   Glucose by meter   Result Value Ref Range    Glucose 183 (H) 70 - 99 mg/dL   Glucose by meter   Result Value Ref Range    Glucose 150 (H) 70 - 99 mg/dL   Glucose by meter   Result Value Ref Range    Glucose 162 (H) 70 - 99 mg/dL   Glucose by meter   Result Value Ref Range    Glucose 214 (H) 70 - 99 mg/dL   Sirolimus level   Result Value Ref Range    Sirolimus Last Dose Not Provided     Sirolimus Level 9.8 5.0 - 15.0 ug/L   Voriconazole Level   Result Value Ref Range    Voriconazole 2.2 1.0 - 5.5 ug/mL   CBC with platelets   Result Value Ref Range    WBC 7.3 4.0 - 11.0 10e9/L    RBC Count 3.63 (L) 3.8 - 5.2 10e12/L    Hemoglobin 9.6 (L) 11.7 - 15.7 g/dL    Hematocrit 32.8 (L) 35.0 - 47.0 %    MCV 90 78 - 100 fl    MCH 26.4 (L) 26.5 - 33.0 pg    MCHC 29.3 (L) 31.5 - 36.5 g/dL    RDW 15.5 (H) 10.0 - 15.0 %    Platelet Count 255 150 - 450 10e9/L   Comprehensive metabolic panel   Result Value Ref Range    Sodium 138 133 - 144 mmol/L    Potassium 4.5 3.4 - 5.3 mmol/L    Chloride 110 (H) 94 - 109 mmol/L    Carbon Dioxide 21 20 - 32 mmol/L    Anion Gap 8 3 - 14 mmol/L    Glucose 155 (H) 70 - 99 mg/dL    Urea Nitrogen 43 (H) 7 - 30 mg/dL    Creatinine 1.62 (H) 0.52 - 1.04 mg/dL    GFR Estimate 32 (L) >60 mL/min/[1.73_m2]    GFR Estimate If Black 37 (L)  ">60 mL/min/[1.73_m2]    Calcium 8.8 8.5 - 10.1 mg/dL    Bilirubin Total 0.2 0.2 - 1.3 mg/dL    Albumin 2.4 (L) 3.4 - 5.0 g/dL    Protein Total 6.6 (L) 6.8 - 8.8 g/dL    Alkaline Phosphatase 218 (H) 40 - 150 U/L    ALT 94 (H) 0 - 50 U/L    AST 41 0 - 45 U/L     *Note: Due to a large number of results and/or encounters for the requested time period, some results have not been displayed. A complete set of results can be found in Results Review.           Assessment & Plan       ICD-10-CM    1. Type 2 diabetes mellitus with stage 3 chronic kidney disease, without long-term current use of insulin (H)- well controlled and managed by endocrinology. Will follow up with internal medicine in [place].  E11.22 glucose (BD GLUCOSE) 5 g chewable tablet as needed for hypoglycemia.     N18.3    2. 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 Good support from family and friends.    3. Immunosuppression (H) D89.9 At continued risk for infections.    4. Liver replaced by transplant (H) Z94.4 STATIN NOT PRESCRIBED (INTENTIONAL)- follow up with transplant team as scheduled. Compliant with medication    5. Postoperative hypothyroidism E89.0 Stable    6. Hypertension goal BP (blood pressure) < 140/80 I10 Well controlled on medications    7. CKD (chronic kidney disease) stage 3, GFR 30-59 ml/min (H) N18.3 Had acute kidney injury and is improving   8. Physical deconditioning R53.81 Gradually improving activity level and tolerance.    9. High risk medication use Z79.899 Continued close follow up with physician in [place]. Access to [facility] discussed.        BMI:   Estimated body mass index is 28.55 kg/m  as calculated from the following:    Height as of this encounter: 1.715 m (5' 7.5\").    Weight as of this encounter: 83.9 kg (185 lb).           CONSULTATION/REFERRAL to internal medicine provider in [place] for follow up and will go to [facility] as needed for follow up also. Labs " and medications managed by transplant team and gastroenterology.   FUTURE LABS:       - Schedule a non-fasting blood draw per transplant team and 3 months follow up diabetes, renal function.   FUTURE APPOINTMENTS:       - Follow-up visit in 1-2 months or sooner if any questions or concerns.   Regular exercise  See Patient Instructions    No follow-ups on file.    Jennifer Barraza MD  [facility]

## 2019-09-20 PROBLEM — A41.50 GRAM NEGATIVE SEPSIS (H): Status: RESOLVED | Noted: 2019-08-27 | Resolved: 2019-09-20

## 2019-09-20 PROBLEM — N39.0 UTI (URINARY TRACT INFECTION): Status: RESOLVED | Noted: 2019-08-27 | Resolved: 2019-09-20

## 2019-09-20 ASSESSMENT — ENCOUNTER SYMPTOMS
ABDOMINAL PAIN: 0
FREQUENCY: 0
EYE PAIN: 0
FEVER: 0
DIZZINESS: 0
HEADACHES: 0
APPETITE CHANGE: 0
NAUSEA: 0
SORE THROAT: 0
SHORTNESS OF BREATH: 0
COUGH: 0
HEARTBURN: 0
ARTHRALGIAS: 0
CONSTIPATION: 0

## 2019-09-20 NOTE — PROGRESS NOTES
HISTORY OF PRESENT ILLNESS:  Virginia is a 69 year old female, (1950), admitted to [facility] [place] from [facility]. Hospital stay 8/26/19 through 8/31/19.  Admitted to this facility for rehab, medical management and nursing care.     HPI information obtained from: patient report and [facility] chart review.   Brief Summary of Hospital Course: treated for pyelonephritis and DERREK.   MEDICATION CHANGES: Vantin x 13 days, clotrimazole lozge PRN for thrush, Envanz IV x 10 days,  Fiber packet, NPH 10 units in AM.   RECOMMENDED FOLLOW UP: Dr. Vasquez of endocrinology.   Updates on Status Since Skilled nursing Admission:  9/4  Diabetic diet. 2.  Discontinue NPH. 3.  Start Linagliptin 5 mg po every day.  Dx: DM2 4.  Change dates for Cefpodoxime to 9/12-9/25.       Past Medical History/  Patient Active Problem List    Diagnosis Date Noted     Immunosuppression (H) 09/19/2019     Priority: Medium     Papillary thyroid carcinoma (H) 09/19/2019     Priority: Medium     Physical deconditioning 09/06/2019     Priority: Medium     Escherichia coli sepsis (H) 08/28/2019     Priority: Medium     Pain of right thumb 08/08/2019     Priority: Medium     CKD (chronic kidney disease) stage 3, GFR 30-59 ml/min (H) 09/21/2018     Priority: Medium     Other infective chronic otitis externa of left ear 09/10/2018     Priority: Medium     Tympanic membrane perforation, left 08/27/2018     Priority: Medium     Type 2 diabetes mellitus with stage 3 chronic kidney disease, without long-term current use of insulin (H) 08/06/2018     Priority: Medium     Age-related osteoporosis without current pathological fracture 08/06/2018     Priority: Medium     Postoperative hypothyroidism 07/25/2018     Priority: Medium     Hyperlipidemia LDL goal <70      Priority: Medium     Sensorineural hearing loss (SNHL) of both ears 05/02/2017     Priority: Medium     Abnormal liver function tests 08/20/2016      Priority: Medium     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 05/31/2016     Priority: Medium     High risk medication use 05/24/2016     Priority: Medium     Statin intolerance 05/24/2016     Priority: Medium     Prophylactic antibiotic 03/05/2014     Priority: Medium     Hypertension goal BP (blood pressure) < 140/80 11/06/2013     Priority: Medium     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 Gibson, urologist        Liver replaced by transplant (H) 10/17/2011     Priority: Medium     Dr. Gentry Ramirez, [facility]         Advanced directives, counseling/discussion 09/27/2012     Priority: Low     Discussed advance care planning with patient; information given to patient to review. 9/27/2012          HDL deficiency 09/25/2012     Priority: Low       Past Surgical History:   Procedure Laterality Date     APPENDECTOMY  1961     BIOPSY       CATARACT IOL, RT/LT      RE12/19/2013, LE12/10/2013 - Toric lenses     CHOLECYSTECTOMY  1991     COLONOSCOPY  3/10/2014    Procedure: COLONOSCOPY;;  Surgeon: Gentry Ramirez MD;  Location:  GI     ear drum repair       ENDOBRONCHIAL ULTRASOUND FLEXIBLE N/A 9/29/2017    Procedure: ENDOBRONCHIAL ULTRASOUND FLEXIBLE;  Flexible Bronchoscopy, Endobronchial Ultrasound, Transbronchial Needle Aspiration ;  Surgeon: Eden Clinton MD;  Location:  OR     ENDOSCOPIC RETROGRADE CHOLANGIOPANCREATOGRAM  9/19/2013    Procedure: ENDOSCOPIC RETROGRADE CHOLANGIOPANCREATOGRAM;  Endoscopic Retrograde Cholangiopancreatogram with single balloon enteroscopy, ballon sweep of bile duct;  Surgeon: Brett Membreno MD;  Location:  OR      KNEE SCOPE,MED/LAT MENISECTOMY  8/10/12    Right, partial medial menisectomy only     KNEE SURGERY  1966    R knee      PICC INSERTION  2013    4fr SL PASV PICC, 40cm (1cm external) in the R basilic vein w/ tip in the low SVC     PICC INSERTION  2014    5 fr DL BioFlo Navilyst PICC, 46 cm (3 cm external) in the L basilic vein w/ tip in the SVC RA junction.     THYROIDECTOMY  3/2010     TRANSPLANT LIVER RECIPIENT LIVING UNRELATED         Family History   Problem Relation Age of Onset     Hypertension Mother      Endometrial Cancer Mother      Hyperlipidemia Mother      Prostate Cancer Father      Macular Degeneration Father      Cancer - colorectal Maternal Grandmother         in her 80's, has surgery and removal of part of kidney,  at age 98     Heart Disease Maternal Grandfather          at 98     Glaucoma Maternal Grandfather      Cerebrovascular Disease Paternal Grandmother         in her 80's     Hypertension Paternal Grandmother      Heart Disease Paternal Grandfather         MI     Alzheimer Disease Paternal Grandfather      Allergies Son      Neurologic Disorder Daughter         Migraines     Breast Cancer Other      Anesthesia Reaction No family hx of      Crohn's Disease No family hx of      Ulcerative Colitis No family hx of            Social History     Socioeconomic History     Marital status:      Spouse name: Robbin Thompson     Number of children: 4     Years of education: 20     Highest education level: Not on file   Occupational History     Occupation: Guardian Conservator      Employer: [employer]     Comment: social work     Employer: SELF   Social Needs     Financial resource strain: Not on file     Food insecurity:     Worry: Not on file     Inability: Not on file     Transportation needs:     Medical: Not on file     Non-medical: Not on file   Tobacco Use     Smoking status: Former Smoker     Packs/day: 1.00     Years: 18.00     Pack years: 18.00     Types: Cigarettes     Last attempt to quit: 1985     Years since quittin.4     Smokeless tobacco: Never  "Used   Substance and Sexual Activity     Alcohol use: Yes     Alcohol/week: 0.0 oz     Comment: rare - \"I toast at weddings\"     Drug use: No     Sexual activity: Yes     Partners: Male     Birth control/protection: Post-menopausal   Lifestyle     Physical activity:     Days per week: Not on file     Minutes per session: Not on file     Stress: Not on file   Relationships     Social connections:     Talks on phone: Not on file     Gets together: Not on file     Attends Yazidi service: Not on file     Active member of club or organization: Not on file     Attends meetings of clubs or organizations: Not on file     Relationship status: Not on file     Intimate partner violence:     Fear of current or ex partner: Not on file     Emotionally abused: Not on file     Physically abused: Not on file     Forced sexual activity: Not on file   Other Topics Concern     Parent/sibling w/ CABG, MI or angioplasty before 65F 55M? Not Asked      Service Not Asked     Blood Transfusions Not Asked     Caffeine Concern Not Asked     Occupational Exposure Not Asked     Hobby Hazards Not Asked     Sleep Concern Not Asked     Stress Concern Not Asked     Weight Concern Not Asked     Special Diet Not Asked     Back Care Not Asked     Exercise Yes     Comment: cardio and strengthing     Bike Helmet Not Asked     Seat Belt Not Asked     Self-Exams Not Asked   Social History Narrative    She is retired. She lives in the [facility] [place] the course of the [place]. 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        Current Outpatient Medications   Medication Sig     acetaminophen (TYLENOL) 325 MG tablet Take 2 tablets (650 mg) by mouth every 6 hours as needed for mild pain or fever     alirocumab (PRALUENT) 150 MG/ML injectable pen Inject 1 mL (150 mg) Subcutaneous every 14 days     calcitRIOL (ROCALTROL) 0.5 MCG capsule Take 1 capsule (0.5 mcg) by mouth daily     " cefpodoxime (VANTIN) 200 MG tablet Take 1 tablet (200 mg) by mouth 2 times daily for 13 days     ciprofloxacin-dexamethasone (CIPRODEX) 0.3-0.1 % otic suspension Place 4 drops Into the left ear three times a week     clotrimazole (LOTRIMIN) 1 % external cream Apply topically 2 times daily as needed Reported on 4/25/2017     clotrimazole (MYCELEX) 10 MG LOZG lozenge Place 1 lozenge (1 Harinder) inside cheek 3 times daily as needed (for thrush)     ELIQUIS 2.5 MG tablet Take 1 tablet (2.5 mg) by mouth 2 times daily     estradiol (ESTRACE) 0.1 MG/GM vaginal cream Place 2 g vaginally twice a week     ezetimibe (ZETIA) 10 MG tablet Take 1 tablet (10 mg) by mouth daily     ferrous gluconate (FERGON) 324 (38 Fe) MG tablet Take 1 tablet (324 mg) by mouth 3 times daily (with meals)     folic acid (FOLVITE) 1 MG tablet Take 1 tablet (1 mg) by mouth daily     furosemide (LASIX) 20 MG tablet Take 1 tablet (20 mg) by mouth daily     glucose 40 % (400 mg/mL) gel Take 15-30 g by mouth every 15 minutes as needed for low blood sugar     Guar Gum (FIBER MODULAR, NUTRISOURCE FIBER,) packet 1 packet by Oral or Feeding Tube route daily     hydrALAZINE (APRESOLINE) 25 MG tablet Take 1 tablet (25 mg) by mouth 3 times daily as needed (For SBP >140)     hypromellose (ARTIFICIAL TEARS) 0.4 % SOLN ophthalmic solution Apply 1 drop to eye every hour as needed for dry eyes     levothyroxine (SYNTHROID/LEVOTHROID) 150 MCG tablet Take 1 tablet (150 mcg) by mouth every other day     levothyroxine (SYNTHROID/LEVOTHROID) 175 MCG tablet Take 1 tablet (175 mcg) by mouth every other day     linagliptin (TRADJENTA) 5 MG TABS tablet Take 1 tablet (5 mg) by mouth daily     losartan (COZAAR) 25 MG tablet Take 1 tablet (25 mg) by mouth daily     meclizine (ANTIVERT) 25 MG tablet Take 1 tablet (25 mg) by mouth as needed for dizziness or other (migraines)     metoprolol succinate ER (TOPROL-XL) 50 MG 24 hr tablet Take 1 tablet (50 mg) by mouth daily      metroNIDAZOLE (METROGEL) 0.75 % external gel Apply topically 2 times daily Put on face     Multiple Vitamins-Minerals (PRESERVISION AREDS 2) CAPS Take 1 capsule by mouth 2 times daily     omega-3 acid ethyl esters (LOVAZA) 1 g capsule Take 1 capsule (1 g) by mouth 2 times daily     predniSONE (DELTASONE) 10 MG tablet Take 1 tablet (10 mg) by mouth daily     RAPAMUNE (BRAND) 1 MG tablet Take 1 tablet (1 mg) by mouth every other day     sennosides (SENOKOT) 8.6 MG tablet Take 2 tablets by mouth 2 times daily as needed for constipation     sertraline (ZOLOFT) 50 MG tablet Take 1 tablet (50 mg) by mouth At Bedtime     SUMAtriptan (IMITREX) 50 MG tablet Take 1 tablet (50 mg) by mouth at onset of headache for migraine repeat after 2 hours if needed.     ursodiol (ACTIGALL) 250 MG tablet Take 1 tablet (250 mg) by mouth 2 times daily     voriconazole (VFEND) 200 MG tablet Take 1 tablet (200 mg) by mouth 2 times daily     blood glucose (ACCU-CHEK GUIDE) test strip Use to test blood sugar 2 times daily or as directed.     blood glucose monitoring (ACCU-CHEK FASTCLIX) lancets Use to test blood sugar 2 times daily or as directed.     glucose (BD GLUCOSE) 5 g chewable tablet Take 2 tablets (10 g) by mouth as needed (low blood sugar)     order for DME Equipment being ordered: right brace with thumb     STATIN NOT PRESCRIBED (INTENTIONAL) Please choose reason not prescribed, below     No current facility-administered medications for this visit.        Allergies   Allergen Reactions     Fluconazole Hives and Itching     Azithromycin Itching     Benadryl [Diphenhydramine Hcl]      Insomnia      Cellcept Diarrhea     Ciprofloxacin Other (See Comments)     Insomnia, mood lability, Irregular heart beat, anxiety     Codeine      Psych disturbance     Diphenhydramine Other (See Comments)     Doxycycline      Lansoprazole Diarrhea     Levaquin [Levofloxacin] Other (See Comments)     Headache, hyperactivity     Lisinopril Cough      Methotrexate      Sores     Morphine Sulfate Itching     Mycophenolate Diarrhea     Simvastatin Muscle Pain (Myalgia)     severe     Cephalexin Rash     Fever and skin burning     Penicillin G Itching and Rash     Tolectin [Nsaids] Rash     Tramadol Rash       Information reviewed:  Medications, vital signs, orders, and nursing notes.    Review of Systems   Constitutional: Negative for appetite change and fever.   HENT: Negative for congestion, ear pain and sore throat.    Eyes: Negative for pain.   Respiratory: Negative for cough and shortness of breath.    Cardiovascular: Negative for chest pain and peripheral edema.   Gastrointestinal: Negative for abdominal pain, constipation, heartburn and nausea.   Genitourinary: Negative for frequency.   Musculoskeletal: Negative for arthralgias.   Neurological: Negative for dizziness and headaches.   Psychiatric/Behavioral: Negative for mood changes.        OBJECTIVE:  /76   Pulse 65   Temp 98  F (36.7  C)   Resp 16   Wt 83.2 kg (183 lb 6.4 oz)   LMP 06/01/1988 (Approximate)   SpO2 98%   BMI 30.52 kg/m    Physical Exam   Constitutional: She appears well-developed and well-nourished. She is cooperative. No distress.   HENT:   Right Ear: External ear normal.   Left Ear: External ear normal.   Nose: Nose normal.   Mouth/Throat: Oropharynx is clear and moist.   Eyes: Pupils are equal, round, and reactive to light. Conjunctivae are normal. Right eye exhibits no discharge. Left eye exhibits no discharge.   Neck: Neck supple. No tracheal deviation present. No thyromegaly present.   Cardiovascular: Normal rate, regular rhythm, S1 normal, S2 normal and normal heart sounds.   No murmur heard.  Pulmonary/Chest: Effort normal and breath sounds normal. No respiratory distress. She has no wheezes. She has no rales.   Abdominal: Soft. Bowel sounds are normal. She exhibits no mass. There is no tenderness.   Musculoskeletal: Normal range of motion. She exhibits no edema.    Lymphadenopathy:     She has no cervical adenopathy.   Neurological: She is alert. She has normal strength and normal reflexes. She exhibits normal muscle tone.   Skin: Skin is warm and dry. No rash noted.   Psychiatric: She has a normal mood and affect.        ASSESSMENT/PLAN:    Escherichia coli sepsis (H)  Currently on IV antibiotics through 9/11.  And then should start Cefpodoxime PO 9/12 through 9/25.  She is looking forward to discharge back home in a couple days.    Physical deconditioning  Independent in her room.  Continue physical therapy and Occupational Therapy.    Type 2 diabetes mellitus with stage 3 chronic kidney disease, without long-term current use of insulin (H)  Followed by endocrinology.  Blood sugars ranging 100s to 180s.  Currently on linagliptin 5 mg daily.    Liver replaced by transplant (H)  Follows with transplant at the [facility].  Continue her long-term medications.          Kanchan Kirkland MD

## 2019-09-20 NOTE — ASSESSMENT & PLAN NOTE
Currently on IV antibiotics through 9/11.  And then should start Cefpodoxime PO 9/12 through 9/25.  She is looking forward to discharge back home in a couple days.

## 2019-09-20 NOTE — ASSESSMENT & PLAN NOTE
Followed by endocrinology.  Blood sugars ranging 100s to 180s.  Currently on linagliptin 5 mg daily.

## 2019-09-23 ENCOUNTER — MYC MEDICAL ADVICE (OUTPATIENT)
Dept: PSYCHIATRY | Facility: CLINIC | Age: 69
End: 2019-09-23

## 2019-09-23 DIAGNOSIS — F43.21 ADJUSTMENT DISORDER WITH DEPRESSED MOOD: ICD-10-CM

## 2019-09-23 RX ORDER — SERTRALINE HYDROCHLORIDE 100 MG/1
TABLET, FILM COATED ORAL
Qty: 90 TABLET | Refills: 2 | Status: SHIPPED | OUTPATIENT
Start: 2019-09-23 | End: 2020-08-17

## 2019-09-24 DIAGNOSIS — A41.51 ESCHERICHIA COLI SEPSIS (H): ICD-10-CM

## 2019-09-24 RX ORDER — CEFPODOXIME PROXETIL 200 MG/1
200 TABLET, FILM COATED ORAL 2 TIMES DAILY
Qty: 28 TABLET | Refills: 0 | Status: SHIPPED | OUTPATIENT
Start: 2019-09-24 | End: 2019-10-10

## 2019-10-01 ENCOUNTER — HEALTH MAINTENANCE LETTER (OUTPATIENT)
Age: 69
End: 2019-10-01

## 2019-10-10 DIAGNOSIS — I48.0 PAROXYSMAL ATRIAL FIBRILLATION (H): ICD-10-CM

## 2019-10-10 RX ORDER — METOPROLOL SUCCINATE 50 MG/1
50 TABLET, EXTENDED RELEASE ORAL DAILY
Qty: 90 TABLET | Refills: 3 | Status: SHIPPED | OUTPATIENT
Start: 2019-10-10 | End: 2020-01-14

## 2019-10-11 PROBLEM — M79.644 PAIN OF RIGHT THUMB: Status: RESOLVED | Noted: 2019-08-08 | Resolved: 2019-10-11

## 2019-10-11 NOTE — PROGRESS NOTES
Discharge Summary - Hand Therapy    Patient did not return to therapy after the initial evaluation.  We will assume that patient's goals were met.    D/C from [facility].

## 2019-10-28 DIAGNOSIS — Z94.4 LIVER REPLACED BY TRANSPLANT (H): ICD-10-CM

## 2019-10-28 RX ORDER — SIROLIMUS 1 MG
1 TABLET ORAL EVERY OTHER DAY
Qty: 45 TABLET | Refills: 3 | Status: SHIPPED | OUTPATIENT
Start: 2019-10-28 | End: 2020-05-29

## 2019-11-12 DIAGNOSIS — I48.0 PAROXYSMAL ATRIAL FIBRILLATION (H): ICD-10-CM

## 2019-11-13 NOTE — TELEPHONE ENCOUNTER
"ELIQUIS ANTICOAGULANT 2.5 MG tablet      Last Written Prescription Date:  8/31/19  Last Fill Quantity: 180,   # refills: 3  Last Office Visit : 8/7/19  Future Office visit:  Recommended 1 year.    Per last cardiology note with known Cr values: \"Paraoxysmal a-fib: No recent episodes of AF. Continue metoprolol XL 50 mg daily for rate control and Eliquis 2.5 mg BID for stroke prophylaxis\"      "

## 2019-12-15 ENCOUNTER — HEALTH MAINTENANCE LETTER (OUTPATIENT)
Age: 69
End: 2019-12-15

## 2019-12-22 ENCOUNTER — MYC MEDICAL ADVICE (OUTPATIENT)
Dept: INFECTIOUS DISEASES | Facility: CLINIC | Age: 69
End: 2019-12-22

## 2019-12-22 DIAGNOSIS — A41.51 ESCHERICHIA COLI SEPSIS (H): ICD-10-CM

## 2019-12-22 DIAGNOSIS — B38.2 COCCIDIOIDAL PNEUMONITIS (H): ICD-10-CM

## 2019-12-23 DIAGNOSIS — Z94.4 LIVER REPLACED BY TRANSPLANT (H): ICD-10-CM

## 2019-12-23 RX ORDER — FOLIC ACID 1 MG/1
1 TABLET ORAL DAILY
Qty: 100 TABLET | Refills: 3 | Status: SHIPPED | OUTPATIENT
Start: 2019-12-23 | End: 2020-12-30

## 2019-12-23 RX ORDER — VORICONAZOLE 200 MG/1
200 TABLET, FILM COATED ORAL 2 TIMES DAILY
Qty: 180 TABLET | Refills: 0 | Status: SHIPPED | OUTPATIENT
Start: 2019-12-23 | End: 2020-04-24

## 2019-12-23 RX ORDER — CEFPODOXIME PROXETIL 200 MG/1
200 TABLET, FILM COATED ORAL 2 TIMES DAILY
Qty: 28 TABLET | Refills: 0 | Status: SHIPPED | OUTPATIENT
Start: 2019-12-23 | End: 2022-05-24

## 2020-02-04 ENCOUNTER — TELEPHONE (OUTPATIENT)
Dept: CARDIOLOGY | Facility: CLINIC | Age: 70
End: 2020-02-04

## 2020-02-04 NOTE — TELEPHONE ENCOUNTER
Pt requesting rx for Repatha, didn't see on med list    Thank you!  Lashay Watters [facility]

## 2020-02-06 ENCOUNTER — TELEPHONE (OUTPATIENT)
Dept: CARDIOLOGY | Facility: CLINIC | Age: 70
End: 2020-02-06

## 2020-02-06 DIAGNOSIS — Z78.9 STATIN INTOLERANCE: ICD-10-CM

## 2020-02-06 DIAGNOSIS — N18.30 TYPE 2 DIABETES MELLITUS WITH STAGE 3 CHRONIC KIDNEY DISEASE, WITHOUT LONG-TERM CURRENT USE OF INSULIN (H): ICD-10-CM

## 2020-02-06 DIAGNOSIS — E78.1 HYPERTRIGLYCERIDEMIA: ICD-10-CM

## 2020-02-06 DIAGNOSIS — E78.6 HDL DEFICIENCY: ICD-10-CM

## 2020-02-06 DIAGNOSIS — E11.22 TYPE 2 DIABETES MELLITUS WITH STAGE 3 CHRONIC KIDNEY DISEASE, WITHOUT LONG-TERM CURRENT USE OF INSULIN (H): ICD-10-CM

## 2020-02-06 DIAGNOSIS — Z86.73 H/O: CVA (CEREBROVASCULAR ACCIDENT): ICD-10-CM

## 2020-02-06 DIAGNOSIS — E78.5 HYPERLIPIDEMIA LDL GOAL <70: Primary | ICD-10-CM

## 2020-02-06 NOTE — TELEPHONE ENCOUNTER
Prior Authorization Approval    Authorization Effective Date: 2/2/2020  Authorization Expiration Date: 8/4/2020  Medication: Repatha 140MG/ML Sureclick (APPROVED)  Approved Dose/Quantity: 2ML/28 days  Reference #:     Insurance Company: [employer] - Phone [phone] Fax [phone]  Expected CoPay:       CoPay Card Available:      Foundation Assistance Needed:    Which Pharmacy is filling the prescription (Not needed for infusion/clinic administered): [facility] - [place], [place] - [address]  Pharmacy Notified: Yes  Patient Notified: Yes

## 2020-02-06 NOTE — TELEPHONE ENCOUNTER
Patient's insurance will no longer cover Praluent which is why patient was requesting Repatha. Please send new rx to [facility]. Thank you

## 2020-02-07 ENCOUNTER — MYC MEDICAL ADVICE (OUTPATIENT)
Dept: CARDIOLOGY | Facility: CLINIC | Age: 70
End: 2020-02-07

## 2020-02-07 NOTE — TELEPHONE ENCOUNTER
Date: 2/7/2020    Time of Call: 9:58 AM     Diagnosis:  HLD     [ VORB ] Ordering provider: Dr Randell Reyna  Order:   - Stop Praluent  - Start Repatha 140 mg q14d  - Repeat fasting labs one week after second injection     Order received by: Antionette Damon LPN     Follow-up/additional notes: Per formulary change.  Ok'd in clinic conversation.  Order placed.

## 2020-02-10 DIAGNOSIS — E89.2 POSTABLATIVE HYPOPARATHYROIDISM (H): ICD-10-CM

## 2020-02-11 NOTE — TELEPHONE ENCOUNTER
Date: 2/10/2020    Time of Call: 7:40 PM     Diagnosis:  A Fib     [ VORB ] Ordering provider: Dr Randell Reyna  Order: Hold Eliquis for 48 hours prior to procedure.     Order received by: Antionette Damon LPN     Follow-up/additional notes: Per clinic conversation.  MyChart sent to Pt.

## 2020-02-12 ENCOUNTER — TELEPHONE (OUTPATIENT)
Dept: TRANSPLANT | Facility: CLINIC | Age: 70
End: 2020-02-12

## 2020-02-12 NOTE — TELEPHONE ENCOUNTER
Pt called to report that she received a letter from her insurance that sirolimus is not on formulary. Needs to change or determine what next steps are. Message to PA team.

## 2020-02-13 RX ORDER — CALCITRIOL 0.5 UG/1
0.5 CAPSULE, LIQUID FILLED ORAL DAILY
Qty: 90 CAPSULE | Refills: 1 | Status: SHIPPED | OUTPATIENT
Start: 2020-02-13 | End: 2020-08-28

## 2020-02-13 NOTE — TELEPHONE ENCOUNTER
Patient states they are getting brand Rapamune from the pharmacy for about $400 for 30 days.  Patient wasn't aware there was a generic Sirolimus as an option available to them. They are aware of the $220.98 for 90 days and is willing to switch to the generic Sirolimus.  Discussed when switching from brand to generic the clinic usually wants weekly updated labs.   Patient would like the coordinator to send lab orders to patient via [employer] so they can get their labs completed in [place].  Patient was transferred to [facility] to discuss the differences between generic vs. Brand.  Liaison will have coordinator follow up further with patient about the switch.    ---Patient also wondering if coordinator has checked with MD Ramirez about Rapamune and if there was any need to switch to Cyclosporine as patient is getting a squamous cell removal and had previous concerns about Rapamune and healing of skin.    Routing to coordinator.

## 2020-02-13 NOTE — TELEPHONE ENCOUNTER
Calcitriol Oral Capsule 0.5 MCG      Last Written Prescription Date:  8/31/19  Last Fill Quantity: 90,   # refills: 3, this was a transitional prescription from Dr ANTONIETA Pace to a pharmacy she does not use  Last Office Visit : 8/5/19  Future Office visit:  5/18/20    Routing refill request to provider for review/approval because:  Drug not on the [facility], [facility] or [facility] refill protocol or controlled substance

## 2020-02-13 NOTE — PROGRESS NOTES
Notified by [employer], will review in nodule conference.
same name as above

## 2020-02-13 NOTE — TELEPHONE ENCOUNTER
Prior Authorization Not Needed per Insurance    Medication: Sirolimus 1mg - NO PA NEEDED  Insurance Company: [employer] - Phone [phone] Fax [phone]  Expected CoPay: $220.98    Pharmacy Filling the Rx:  [id number] - [place], [place] - [address]  Pharmacy Notified:    Patient Notified: Yes    Test claim = refill too soon, next fill 02/28/20.  Spoke to the pharmacy  to verify if the next fill will require any prior auths or will pharmacy have any rejections regarding the Sirolimus being non-formulary.   Rep confirmed the Sirolimus will pay through after 02/28/20 without issue and the expected copay will be around = $220.98 for a 90 day supply.   Left message for patient to advise of this.

## 2020-03-22 ENCOUNTER — HEALTH MAINTENANCE LETTER (OUTPATIENT)
Age: 70
End: 2020-03-22

## 2020-04-20 ENCOUNTER — VIRTUAL VISIT (OUTPATIENT)
Dept: ENDOCRINOLOGY | Facility: CLINIC | Age: 70
End: 2020-04-20
Payer: MEDICARE

## 2020-04-20 ENCOUNTER — TELEPHONE (OUTPATIENT)
Dept: TRANSPLANT | Facility: CLINIC | Age: 70
End: 2020-04-20

## 2020-04-20 DIAGNOSIS — M81.8 OTHER OSTEOPOROSIS WITHOUT CURRENT PATHOLOGICAL FRACTURE: ICD-10-CM

## 2020-04-20 DIAGNOSIS — C73 MALIGNANT NEOPLASM OF THYROID GLAND (H): ICD-10-CM

## 2020-04-20 DIAGNOSIS — E11.22 TYPE 2 DIABETES MELLITUS WITH STAGE 3 CHRONIC KIDNEY DISEASE, WITHOUT LONG-TERM CURRENT USE OF INSULIN (H): Primary | ICD-10-CM

## 2020-04-20 DIAGNOSIS — E89.0 POSTOPERATIVE HYPOTHYROIDISM: ICD-10-CM

## 2020-04-20 DIAGNOSIS — Z94.4 LIVER REPLACED BY TRANSPLANT (H): Primary | ICD-10-CM

## 2020-04-20 DIAGNOSIS — E89.2 POSTABLATIVE HYPOPARATHYROIDISM (H): ICD-10-CM

## 2020-04-20 DIAGNOSIS — N18.30 TYPE 2 DIABETES MELLITUS WITH STAGE 3 CHRONIC KIDNEY DISEASE, WITHOUT LONG-TERM CURRENT USE OF INSULIN (H): Primary | ICD-10-CM

## 2020-04-20 RX ORDER — SIROLIMUS 1 MG/1
1 TABLET, FILM COATED ORAL EVERY OTHER DAY
Qty: 45 TABLET | Refills: 3 | Status: SHIPPED | OUTPATIENT
Start: 2020-04-20 | End: 2021-05-07

## 2020-04-20 NOTE — PROGRESS NOTES
"Luz Thompson is a 70 year old female who is being evaluated via a billable telephone visit.      The patient has been notified of following:     \"This telephone visit will be conducted via a call between you and your physician/provider. We have found that certain health care needs can be provided without the need for a physical exam.  This service lets us provide the care you need with a short phone conversation.  If a prescription is necessary we can send it directly to your pharmacy.  If lab work is needed we can place an order for that and you can then stop by our lab to have the test done at a later time.    Telephone visits are billed at different rates depending on your insurance coverage. During this emergency period, for some insurers they may be billed the same as an in-person visit.  Please reach out to your insurance provider with any questions.    If during the course of the call the physician/provider feels a telephone visit is not appropriate, you will not be charged for this service.\"    Patient has given verbal consent for Telephone visit?  Yes    How would you like to obtain your AVS? MyChart    Due to the COVID 19 pandemic this visit was converted to a telephone visit in order to help prevent spread of infection in this patient and the general population.     Phone call started: 9:06 AM  Phone call ended: 9:44 AM  Phone call duration: 38 minutes     The patient is seen in follow-up of papillary thyroid cancer, osteoporosis, diabetes.     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  She remains on voriconazole.  She recently moved to [place] and she is planning on establishing endocrinology care there.    While moving, she injured her back and she has not been exercising much recently.  She denies taking any calcium supplements but she reports being compliant in having 2 glasses " of milk (of 12 ounces each) and a daily eye multivitamin which contains 200 units vitamin D.    1. Papillary thyroid cancer, S/P total thyroidectomy and central neck dissection on March 2nd, 2010. Follicular variant, solitary, 3 cm in greatest diameter, with no capsular invasion and negative central neck lymph nodes (0/18 lymph nodes).  MACIS score 5.78.     The WBS done on 12/9/2010 revealed 3 vaque foci of uptake in the L neck. Total iodine uptake was 0.2%.   She underwent radioablation with 159.2 mCi I 131, on 1/27/2010. The WBS done 1 week post treatment revealed 3 foci of uptake in the L neck, which were previously seen on the pretreatment scan.     11/5/12 neck US - normal appearing lymph nodes levels 2 B/L and 3 LN at left level 3   6/7/12 posttreatment WBS - negative   6/25/13 neck US - didn't identify lymph nodes with features suspicious of malignancy. A right level II lymph node appeared to be mildly larger compared with prior images from 2010. Two left level III lymph nodes were again noted, of normal size.   5/19/14 and 6/14/2016 neck USs  - no definite suspicious looking lymph nodes    Thyroglobulin antibodies were negative. I reviewed prior tyroglobulin levels:  5/5/10         0.21          TSH 7.48   10/25/10     <0.1          TSH 2.92  12/08/10      8.3             12/10/10      3.1           TSH 58.6   8/4/11         <0.1          TSH 0.03  6/8/12         <0.1          TSH 75.1  6/25/13       <0.1          TSH 12.8  3/5/14         <0.1          TSH 6.32   4/15/15       <0.1          TSH 2.4  5/24/16       <0.1          TSH 3.65   5/10/17       <0.1          TSH 4.74   7/13/17       <0.1          TSH 3.66     The dose of levothyroxine was changed in September 2019 to 150 and 175 mcg daily alternatively, every other day.  She did try 175 mcg daily prior and she felt like she was having a lot of caffeine.  Currently, she does not endorse signs or symptoms suggestive of hypo-or  hyperthyroidism.    2. Hypocalcemia - requiring treatment with 0.5 mcg calcitriol daily, in the context of CKD with GFR in the 40s.  It is interesting that her PTH has been normal, mildly elevated on most recent labs.  The plan is to recheck calcium, phosphorus and PTH levels and consider decreasing the dose of calcitriol.    3.  Osteoporosis, not previously treated with antiresorptive drugs, due to impaired GFR.  The DEXA scan from 2015 revealed a significant improvement of bone mineral density at the lumbar spine and mean hip (mainly right femoral neck).  The most negative T score was -2.8, at the right femoral neck.  She fractured her left elbow at age 31.  In the past, she was treated with Fosamax for one year, over 15 years ago.  It was discontinued as she was concerned about potential osteonecrosis of the jaw.  She has complete upper and lower dentures.    I reviewed the images of the most recent DEXA scan from August 2019.  The lowest T score was -3 at 33% distal radius.  At the hips, the lowest T score was -2.6, at the right femoral neck.  Compared with the prior scan, there were no significant changes in bone mineral density.    4. Type 2 diabetes, diagnosed in May 2018, when A1c was 6.8%.  It was 7.5% at her last office visit and it decreased to 5.9% on labwork from 12/1/19, in the context of anemia.   Treatment with Tradjenta was started in September 2019 and the patient reports being compliant in taking it on a daily basis.    She has been checking her blood glucose at home and, in general, it is around 100 in the morning.  If she has a bedtime snack, it might be higher in the morning.  In general, she has brunch (around 10 AM) and supper is the main meals of the day.  For lunch, she might have a small snack around 3 or 4 PM.  In general, dinner is the largest meal of the day.  Recently, she enrolled in a meal program which delivers precooked meals containing less than 45 g per portion size.    Last eye  exam was in the summer 2019.  She denies blurred vision. She has galucoma and sees olpthamology regularly.   She has been experiencing numbness and tingling sensation in her feet since 2018. Urine microalbumin has been positive since 2014.     5.  Mixed dyslipidemia  She follows up with cardiology.  She was not able to tolerate statins due to joint pain.  Currently, she is medicated with Zetia and alirocumab.        Current Outpatient Medications:      acetaminophen (TYLENOL) 325 MG tablet, Take 2 tablets (650 mg) by mouth every 6 hours as needed for mild pain or fever, Disp: , Rfl:      apixaban ANTICOAGULANT (ELIQUIS ANTICOAGULANT) 2.5 MG tablet, Take 1 tablet (2.5 mg) by mouth 2 times daily, Disp: 180 tablet, Rfl: 2     blood glucose (ACCU-CHEK GUIDE) test strip, Use to test blood sugar 2 times daily or as directed., Disp: 100 strip, Rfl: 11     blood glucose monitoring (ACCU-CHEK FASTCLIX) lancets, Use to test blood sugar 2 times daily or as directed., Disp: 102 each, Rfl: 11     calcitRIOL (ROCALTROL) 0.5 MCG capsule, Take 1 capsule (0.5 mcg) by mouth daily, Disp: 90 capsule, Rfl: 1     cefpodoxime (VANTIN) 200 MG tablet, Take 1 tablet (200 mg) by mouth 2 times daily, Disp: 28 tablet, Rfl: 0     ciprofloxacin-dexamethasone (CIPRODEX) 0.3-0.1 % otic suspension, Place 4 drops Into the left ear three times a week, Disp: 5.6 mL, Rfl: 6     clotrimazole (LOTRIMIN) 1 % external cream, Apply topically 2 times daily as needed Reported on 4/25/2017, Disp: , Rfl:      clotrimazole (MYCELEX) 10 MG LOZG lozenge, Place 1 lozenge (1 Harinder) inside cheek 3 times daily as needed (for thrush), Disp: , Rfl:      estradiol (ESTRACE) 0.1 MG/GM vaginal cream, Place 2 g vaginally twice a week, Disp: , Rfl:      evolocumab (REPATHA) 140 MG/ML prefilled autoinjector, Inject 1 mL (140 mg) Subcutaneous every 14 days, Disp: 6 mL, Rfl: 1     ezetimibe (ZETIA) 10 MG tablet, Take 1 tablet (10 mg) by mouth daily, Disp: 90 tablet, Rfl: 3      ferrous gluconate (FERGON) 324 (38 Fe) MG tablet, Take 1 tablet (324 mg) by mouth 3 times daily (with meals), Disp: 90 tablet, Rfl: 0     folic acid (FOLVITE) 1 MG tablet, Take 1 tablet (1 mg) by mouth daily, Disp: 100 tablet, Rfl: 3     furosemide (LASIX) 20 MG tablet, Take 1 tablet (20 mg) by mouth daily, Disp: 90 tablet, Rfl: 3     glucose (BD GLUCOSE) 5 g chewable tablet, Take 2 tablets (10 g) by mouth as needed (low blood sugar), Disp: 40 tablet, Rfl: 1     glucose 40 % (400 mg/mL) gel, Take 15-30 g by mouth every 15 minutes as needed for low blood sugar, Disp: , Rfl:      Guar Gum (FIBER MODULAR, NUTRISOURCE FIBER,) packet, 1 packet by Oral or Feeding Tube route daily, Disp: , Rfl:      hydrALAZINE (APRESOLINE) 25 MG tablet, Take 1 tablet (25 mg) by mouth 3 times daily as needed (For SBP >140), Disp: , Rfl:      hypromellose (ARTIFICIAL TEARS) 0.4 % SOLN ophthalmic solution, Apply 1 drop to eye every hour as needed for dry eyes, Disp: , Rfl:      levothyroxine (SYNTHROID/LEVOTHROID) 150 MCG tablet, Take 1 tablet (150 mcg) by mouth every other day, Disp: , Rfl:      levothyroxine (SYNTHROID/LEVOTHROID) 175 MCG tablet, Take 1 tablet (175 mcg) by mouth every other day, Disp: , Rfl:      linagliptin (TRADJENTA) 5 MG TABS tablet, Take 1 tablet (5 mg) by mouth daily, Disp: 90 tablet, Rfl: 3     losartan (COZAAR) 25 MG tablet, Take 1 tablet (25 mg) by mouth daily, Disp: , Rfl:      meclizine (ANTIVERT) 25 MG tablet, Take 1 tablet (25 mg) by mouth as needed for dizziness or other (migraines), Disp: 30 tablet, Rfl: 11     metoprolol succinate ER (TOPROL-XL) 50 MG 24 hr tablet, Take 0.5 tablets (25 mg) by mouth daily, Disp: 90 tablet, Rfl: 3     metroNIDAZOLE (METROGEL) 0.75 % external gel, Apply topically 2 times daily Put on face, Disp: 45 g, Rfl: 3     Multiple Vitamins-Minerals (PRESERVISION AREDS 2) CAPS, Take 1 capsule by mouth 2 times daily, Disp: , Rfl:      omega-3 acid ethyl esters (LOVAZA) 1 g capsule, Take 1  capsule (1 g) by mouth 2 times daily, Disp: 180 capsule, Rfl: 3     order for DME, Equipment being ordered: right brace with thumb, Disp: 1 Device, Rfl: 0     predniSONE (DELTASONE) 10 MG tablet, Take 1 tablet (10 mg) by mouth daily, Disp: , Rfl:      RAPAMUNE (BRAND) 1 MG tablet, Take 1 tablet (1 mg) by mouth every other day, Disp: 45 tablet, Rfl: 3     sennosides (SENOKOT) 8.6 MG tablet, Take 2 tablets by mouth 2 times daily as needed for constipation, Disp: , Rfl:      sertraline (ZOLOFT) 100 MG tablet, Take 1 tab (100mg) PO daily, Disp: 90 tablet, Rfl: 2     sirolimus (GENERIC EQUIVALENT) 1 MG tablet, Take 1 tablet (1 mg) by mouth every other day, Disp: 45 tablet, Rfl: 3     STATIN NOT PRESCRIBED (INTENTIONAL), Please choose reason not prescribed, below, Disp: , Rfl:      SUMAtriptan (IMITREX) 50 MG tablet, Take 1 tablet (50 mg) by mouth at onset of headache for migraine repeat after 2 hours if needed., Disp: 30 tablet, Rfl: 3     ursodiol (ACTIGALL) 250 MG tablet, Take 1 tablet (250 mg) by mouth 2 times daily, Disp: , Rfl:      voriconazole (VFEND) 200 MG tablet, Take 1 tablet (200 mg) by mouth 2 times daily, Disp: 180 tablet, Rfl: 0    PAST MEDICAL HISTORY:   Primary biliary cirrhosis s/p transplant - may 2002.   Anemia of chronic disease.   Hypothyroidism for 30 years treated with levothyroxine   Post menopausal.   CVA in 2001, symptoms have resolved.   VRE in the stool.   Sjogren syndrome, diagnosed in the 1990s.   Migraines.   UTIs.   Superficial phlebitis.   History of esophageal varices.   Varicose veins.   Heart murmur.  On dyalisis while in coma for hepatic encephalopathy for 18 days  CKD with GFR in the 40s    Osteoporosis  Sepsis of unclear etiology 2013 and 2014   Fatty pancreas on CT  Diverticulosis   Kidney stone - 2013  Emphysema   Atrial fibrillation   Lung nodules -considered benign on prior imaging    PAST SURGICAL HISTORY:   Orthotopic liver transplant in 2002.   Sclerotherapy for varicose  veins in 2008.   Vein stripping for varicose veins in .   Laparoscopic cholecystectomy in .   Appendectomy in .   A benign tumor removal of her right knee in .   L Tympanoplasty in .  Cataract surgery      FH:  Father  of prostate cancer. Mother has HTN, GERD. Both paternal grandmother and paternal grandfather had strokes later in life. Maternal grandmother had colon surgery (?cancer) and myocardial infarct at age 97.     SH.   . She has 2 children and 2 stepchildren. Occupation: retired medical social worker. She denies smoking, drinking alcohol or using illicit drugs.    Review of Systems   Systemic:             Gained some weight over the winter    Eye:                      No eye symptoms   Aracelis-Laryngeal:     no dysphagia, no hoarseness, no cough  Breast:                  No breast symptoms  Cardiovascular:    No cardiovascular symptoms, no CP or palpitations   Pulmonary:           SOB with exertion   Gastrointestinal:   No N/V, no diarrhea or constipation   Genitourinary:       No genitourinary symptoms, no increased thirst or urination; urinates 0-1 times a night    Endocrine:            cold intolerance with no changes over the years, swollen lower extremities  Neurological:        rare headaches, no tremor, no dizziness   Musculoskeletal:   Low back, right thumb and knees joint pain     Skin:                     Swollen feet - wears compression sockings, dry skin, no hair loss   Psychological:     No psychological symptoms    Wt Readings from Last 10 Encounters:   19 83.9 kg (185 lb)   19 83.2 kg (183 lb 6.4 oz)   19 83.2 kg (183 lb 6.4 oz)   19 83.2 kg (183 lb 6.4 oz)   19 84.9 kg (187 lb 3.2 oz)   19 85.7 kg (188 lb 14.4 oz)   19 84.8 kg (187 lb)   19 85.1 kg (187 lb 11.2 oz)   19 85.4 kg (188 lb 4.8 oz)   19 85.4 kg (188 lb 4.8 oz)     PE:  Objective:  Psych: Alert and oriented times 3; coherent speech, normal    rate and volume, able to articulate logical thoughts, able   to abstract reason, no tangential thoughts, no hallucinations   or delusions. Her affect is normal.    Labs:   I reviewed prior lab results documented in EPIC.   Lab Results   Component Value Date    A1C 6.4 10/16/2018    A1C 6.8 05/18/2018     TSH   Date Value Ref Range Status   05/20/2019 12.11 (H) 0.40 - 4.00 mU/L Final     T4 Total   Date Value Ref Range Status   09/23/2014 9.1 5.0 - 11.0 ug/dL Final     T4 Free   Date Value Ref Range Status   05/20/2019 0.99 0.76 - 1.46 ng/dL Final     Assessment/Plan:    1. Type 2 diabetes, well controlled.  The patient has history of peripheral neuropathy.  Recommendations:  Continue to check blood sugar once daily, fasting and at bedtime  Have them meter records sent to us as needed  Continue Tradjenta.    2. Postsurgical hypothyroidism   On the most recent lab work from 12/2019, the TSH was minimally elevated.  In the past, the patient was not able to tolerate a dose of 175 mcg levothyroxine due to hyperthyroid symptoms.  The plan is to recheck the thyroid hormone levels and decide on changing the dose if the TSH is consistently above the upper end of normal range.    3. Hypocalcemia  Calcitriol was initially started in 2010 for management of postsurgical hypocalcemia, presumed to be due to hypoparathyroidism. The PTH level increased after surgery, but the hypocalcemia persisted, requiring tx with calcitriol despite normal 25 OH vitamin D levels. In 2012, it was aggravated by malabsorption, hypomagnesemia. It is now well controlled with calcitriol.  The daily intake of calcium appears to be appropriate.  She takes a fairly low dose of vitamin D.    The plan is to check calcium, phosphorus, albumin and vitamin D level.  Consider decreasing the dose of calcitriol based on this results.    4. Papillary thyroid cancer s/p total thyroidectomy, central neck dissection and radioablation in 2010. Post treatment scan in  6/12 was negative for recurrence. Thyrogen stimulated Tg level was undetectable in June 2012. The nonstimulated thyroglobulin remained undetectable, with most recent value from 2017. On the most recent neck ultrasound from 2016, there were no suspicious looking lymph nodes.  I did not recommend further monitoring.    5.  Osteoporosis.  I am concerned about treating her with inhibitors of bone resorption in the context of possible low bone turnover.  The fact that she has not lost bone mass from 2015 to 2019 is reassuring, although she does continue to have a high risk of fracture.    No orders of the defined types were placed in this encounter.

## 2020-04-23 ENCOUNTER — MEDICAL CORRESPONDENCE (OUTPATIENT)
Dept: HEALTH INFORMATION MANAGEMENT | Facility: CLINIC | Age: 70
End: 2020-04-23

## 2020-04-24 ENCOUNTER — TELEPHONE (OUTPATIENT)
Dept: ENDOCRINOLOGY | Facility: CLINIC | Age: 70
End: 2020-04-24

## 2020-04-24 DIAGNOSIS — E89.2 POSTABLATIVE HYPOPARATHYROIDISM (H): ICD-10-CM

## 2020-04-24 RX ORDER — LEVOTHYROXINE SODIUM 175 UG/1
175 TABLET ORAL EVERY OTHER DAY
Qty: 45 TABLET | Refills: 3 | Status: SHIPPED | OUTPATIENT
Start: 2020-04-24 | End: 2021-04-01

## 2020-04-24 RX ORDER — LEVOTHYROXINE SODIUM 150 UG/1
150 TABLET ORAL EVERY OTHER DAY
Qty: 45 TABLET | Refills: 3 | Status: SHIPPED | OUTPATIENT
Start: 2020-04-24 | End: 2021-04-28

## 2020-05-28 ENCOUNTER — TELEPHONE (OUTPATIENT)
Dept: INFECTIOUS DISEASES | Facility: CLINIC | Age: 70
End: 2020-05-28

## 2020-05-28 NOTE — TELEPHONE ENCOUNTER
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    Previously Tried and Failed: Fluconazole, Posaconazole.    Rationale:  Coccidioides infection, diagnosis 5625-5325 winter season. Followup CT imaging 6/27/2018 shows no worsening of the nodularities over a 1-yr interval. Follow-up Chest CT 5/21/2019 demonstrated unchanged 12 mm cavitary nodule in the lingula since 3/27/2018. However, this nodule had increased in size since 4/24/2017 when it measured 8 mm and was FDG avid on the PET/CT 8/8/2017, so she needs continued therapy. She had severe rash from fluconazole, and did not tolerate posaconazole (GI issues).    Insurance Name:    Medicare [id number]   [facility] MEDICARE SUPPLEMENT [id number]  [facility]      Pharmacy Information (if different than what is on RX)  Name:  [facility] [address], [place]  Phone:  [phone]

## 2020-05-29 DIAGNOSIS — Z94.4 LIVER REPLACED BY TRANSPLANT (H): ICD-10-CM

## 2020-05-29 RX ORDER — URSODIOL 250 MG/1
250 TABLET, FILM COATED ORAL 2 TIMES DAILY
Qty: 180 TABLET | Refills: 3 | Status: SHIPPED | OUTPATIENT
Start: 2020-05-29 | End: 2021-07-13

## 2020-05-29 RX ORDER — SIROLIMUS 1 MG
1 TABLET ORAL EVERY OTHER DAY
Qty: 45 TABLET | Refills: 3 | Status: SHIPPED | OUTPATIENT
Start: 2020-05-29 | End: 2022-05-24

## 2020-05-29 RX ORDER — PREDNISONE 10 MG/1
10 TABLET ORAL DAILY
Qty: 90 TABLET | Refills: 3 | Status: SHIPPED | OUTPATIENT
Start: 2020-05-29 | End: 2021-07-13

## 2020-05-29 NOTE — TELEPHONE ENCOUNTER
PA Initiation    Medication: Voriconazole 200MG -   Insurance Company: [employer] - Phone [phone] Fax [phone]  Pharmacy Filling the Rx: [id number] - [place], [place] - [address] AT [address] [place]  Filling Pharmacy Phone: [phone]  Filling Pharmacy Fax: [phone]  Start Date: 5/29/2020

## 2020-06-03 NOTE — TELEPHONE ENCOUNTER
Prior Authorization Approval    Medication: Voriconazole 200MG - APPROVED 30 day supply at a time was approved on 5/29/2020  Effective:   to 12/31/2020  Reference #:    Approved Dose/Quantity:   Insurance Company: [employer] - Phone [phone] Fax [phone]  Expected CoPay:    Pharmacy Filling the Rx: [id number] - [place], [place] - [address] [facility]  Pharmacy Notified: Yes  Patient Notified: Comment:  **Instructed pharmacy to notify patient when script is ready to /ship.**

## 2020-06-18 ENCOUNTER — TELEPHONE (OUTPATIENT)
Dept: CARDIOLOGY | Facility: CLINIC | Age: 70
End: 2020-06-18

## 2020-06-18 NOTE — TELEPHONE ENCOUNTER
Called and left Pt VM requesting a return call to clinic to discuss a prescription further (Repatha).  Clinic telephone provided.  Also noted MyChart would be sent.    Antionette Damon LPN

## 2020-06-18 NOTE — TELEPHONE ENCOUNTER
----- Message from Rnadell Reyna MD sent at 6/12/2020  4:17 PM CDT -----  Regarding: FW: Patient late to fill notification  Antionette,    Please can you call patient regarding her refill?    Thanks    DD  ----- Message -----  From: Mirela Dowd  Sent: 6/10/2020  11:14 AM CDT  To: Randell Reyna MD, [facility]  Subject: Patient late to fill notification                Patient Late to Fill Notification      Jennifer Barraza or Appropriate Staff:    Medication Name/Strength: Repatha Sureclick 140mg/ml    Medication Last Fill Date: 02/11/2020    We are unable to reach the patient to place their refill order, and they are overdue for their medication at this time according to our records.    If you have any additional information about the current status of this medication, or if a change in therapy occurred (On Hold, Change in Med, Discontinued), please let us know by:    Replying to this message with information, or...  Phone: [phone] or [phone]    This communication was also shared with the Medication Therapy Management team at [facility].  This team of clinical pharmacists is able to meet with patients and assist them with adherence concerns listed above.    If you feel your patient would benefit from meeting with a [facility] pharmacist please place a referral in EPIC.      Thank you for allowing [facility] to service your patients.    Regards,    [facility] Staff

## 2020-07-07 DIAGNOSIS — I48.0 PAROXYSMAL ATRIAL FIBRILLATION (H): ICD-10-CM

## 2020-07-08 NOTE — TELEPHONE ENCOUNTER
apixaban ANTICOAGULANT (ELIQUIS ANTICOAGULANT) 2.5 MG tablet     Last Written Prescription Date:  11/13/2019  Last Fill Quantity: 180,   # refills: 2  Last Office Visit : 8/7/2019  Future Office visit:  None  Routing refill request to provider for review/approval because:  Abnormal Labs:  CR       Also, Pt needing 6 months office visit per refill protocol.     Would Provider like office visit and updated Labs for Pt care?     Refer to clinic for review    Recent Labs   Lab Test 08/30/19  0657   CR 1.53*     Rach Rivera RN  Central Triage Red Flags/Med Refills

## 2020-07-09 NOTE — TELEPHONE ENCOUNTER
Pt due for appointment in 08/2020.  Message sent to scheduling.    Edel Bruce, BSN, RN, PHN  Electrophysiology Nurse Coordinator

## 2020-07-27 DIAGNOSIS — B38.2 COCCIDIOIDAL PNEUMONITIS (H): ICD-10-CM

## 2020-07-27 RX ORDER — VORICONAZOLE 200 MG/1
200 TABLET, FILM COATED ORAL 2 TIMES DAILY
Qty: 180 TABLET | Refills: 0 | Status: SHIPPED | OUTPATIENT
Start: 2020-07-27 | End: 2020-08-17

## 2020-08-04 DIAGNOSIS — E78.5 HYPERLIPIDEMIA LDL GOAL <70: Primary | ICD-10-CM

## 2020-08-10 ENCOUNTER — MYC MEDICAL ADVICE (OUTPATIENT)
Dept: NEPHROLOGY | Facility: CLINIC | Age: 70
End: 2020-08-10

## 2020-08-11 NOTE — PROGRESS NOTES
"Luz Thompson is a 70 year old female who is being evaluated via a billable telephone visit.      The patient has been notified of following:     \"This telephone visit will be conducted via a call between you and your physician/provider. We have found that certain health care needs can be provided without the need for a physical exam.  This service lets us provide the care you need with a short phone conversation.  If a prescription is necessary we can send it directly to your pharmacy.  If lab work is needed we can place an order for that and you can then stop by our lab to have the test done at a later time.    If during the course of the call the physician/provider feels a telephone visit is not appropriate, you will not be charged for this service.\"     Patient has given verbal consent for Telephone visit?  Yes    How would you like to obtain your AVS? Haja    CC: Annual f/up     HPI: Luz Thompson is a 70 year old with a history of paroxysmal a-fib on apixiban, hypertension, hyperlipidemia and hypertriglyceridemia w/statin intolerance, CKD and PBC s/p liver transplant in 2002 who presents for routine follow-up of lipid management. The patient was referred to Dr. Reyna in 6/2017 for lipid management. At the time she was noted to have an elevated LDL (198), total cholesterol (322), and triglyceride (448) levels. She did not tolerate simvastatin or pravastatin due to myalgias. She was started on Zetia 10 mg which she tolerated but lipids remained significantly elevated. In 10/2018 she was started on Praluent 75 mg Q 14 days. She was recently switched to Repatha as her insurance would not longer cover Praluent.    Ms. Thompson states that she has had a difficult year due to the passing of her significant other. She is currently living with her cousin, but is planning to move to [place] in September. She is not very active at the moment due to arthritis. She has not experienced any recent chest pain, " "shortness of breath, orthopnea or PND. She reports a 13 lb weight gain due to inactivity and also fluid retention. She reports lower extremity swelling which has been improving since her nephrologist increased her lasix to 20 mg daily. She denies recent lightheadedness, palpitations or syncope.    In regards to diet, over the past few months she was eating more pre-packed foods because she doesn't have a full kitchen, but recently started preparing her own food again and is eating healthier. Her home blood pressures have been running high 150-160/80-90 and she has needed PRN hydralazine almost daily for the past 1-2 weeks. Her nephrologist recently started her on losartan 25 mg daily which she has not yet taken because she wanted our input first.  She is complicant with her current cardiac medications including Eliquis 2.5 mg BID, metoprolol XL 50 mg daily, lasix 20 mg, hydralazine 25 mg PRN, Zetia 10 mg, Lovaza 1 mg BID, praluent 150 Q 14 days.       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      Diverticulosis of sigmoid colon 12/21/2013     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     Received Rituxan during Summer of 2016     H/O esophageal varices      Hearing loss      Heart murmur 4/4/2012     History of DVT (deep vein thrombosis)      History of [facility] fever      History of thyroid cancer 9/25/2012     Hyperlipidemia 4/10/2012    Says that she does not have it anymore, not on meds     Hyperlipidemia LDL goal <70      Hypertension goal BP (blood pressure) < 140/80 11/06/2013     " Hypertriglyceridemia      Liver replaced by transplant (H) 10/17/2011    Dr. 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  Current Outpatient Medications   Medication     acetaminophen (TYLENOL) 325 MG tablet     apixaban ANTICOAGULANT (ELIQUIS ANTICOAGULANT) 2.5 MG tablet     blood glucose (ACCU-CHEK GUIDE) test strip     blood glucose monitoring (ACCU-CHEK FASTCLIX) lancets     calcitRIOL (ROCALTROL) 0.5 MCG capsule     cefpodoxime (VANTIN) 200 MG tablet     ciprofloxacin-dexamethasone (CIPRODEX) 0.3-0.1 % otic suspension     clotrimazole (LOTRIMIN) 1 % external cream     clotrimazole (MYCELEX) 10 MG LOZG lozenge     estradiol (ESTRACE) 0.1 MG/GM vaginal cream     evolocumab (REPATHA) 140 MG/ML prefilled autoinjector     ezetimibe (ZETIA) 10 MG tablet     ferrous gluconate (FERGON) 324 (38 Fe) MG tablet     folic acid (FOLVITE) 1 MG tablet     furosemide (LASIX) 20 MG tablet     glucose (BD GLUCOSE) 5 g chewable tablet     glucose 40 % (400 mg/mL) gel     Guar Gum (FIBER MODULAR, NUTRISOURCE FIBER,) packet     hydrALAZINE (APRESOLINE) 25 MG tablet     hypromellose (ARTIFICIAL TEARS) 0.4 % SOLN ophthalmic solution     levothyroxine (SYNTHROID/LEVOTHROID) 150 MCG tablet     levothyroxine (SYNTHROID/LEVOTHROID) 175 MCG tablet     linagliptin (TRADJENTA) 5 MG TABS tablet     losartan (COZAAR) 25 MG tablet     meclizine (ANTIVERT) 25 MG tablet     metoprolol succinate ER (TOPROL-XL) 50 MG 24 hr tablet      "metroNIDAZOLE (METROGEL) 0.75 % external gel     Multiple Vitamins-Minerals (PRESERVISION AREDS 2) CAPS     omega-3 acid ethyl esters (LOVAZA) 1 g capsule     order for DME     predniSONE (DELTASONE) 10 MG tablet     RAPAMUNE (BRAND) 1 MG tablet     sennosides (SENOKOT) 8.6 MG tablet     sertraline (ZOLOFT) 100 MG tablet     sirolimus (GENERIC EQUIVALENT) 1 MG tablet     STATIN NOT PRESCRIBED (INTENTIONAL)     SUMAtriptan (IMITREX) 50 MG tablet     ursodiol (ACTIGALL) 250 MG tablet     ursodiol (ACTIGALL) 250 MG tablet     voriconazole (VFEND) 200 MG tablet     No current facility-administered medications for this visit.        ALLERGIES  Fluconazole; Azithromycin; Benadryl [diphenhydramine hcl]; Cellcept; Ciprofloxacin; Codeine; Diphenhydramine; Doxycycline; Lansoprazole; Levaquin [levofloxacin]; Lisinopril; Methotrexate; Morphine sulfate; Mycophenolate; Simvastatin; Cephalexin; Penicillin g; Tolectin [nsaids]; and Tramadol    ROS:  Review Of Systems  Skin: {Skin:100::\"negative\"}  Eyes: {Eyes:200::\"negative\"}  Ears/Nose/Throat: {ENT:300::\"negative\"}  Respiratory: {Resp:400::\"No shortness of breath, dyspnea on exertion, cough, or hemoptysis\"}  Cardiovascular: {CV:500::\"negative\"}  Gastrointestinal: {GI:600::\"negative\"}  Genitourinary: {:700::\"negative\"}  Musculoskeletal: {Musc:800::\"negative\"}  Neurologic: {Neur:900::\"negative\"}  Psychiatric: {Psych:1000::\"negative\"}  Hematologic/Lymphatic/Immunologic: {Hem:1100::\"negative\"}  Endocrine: {Endo:1200::\"negative\"}    Assessment/Plan:  Luz Thompson is a 68 year old with a past medical history significant for paroxysmal a-fib on apixiban, hypertension, hyperlipidemia and hypertriglyceridemia with statin intolerance, CKD and PBC s/p liver transplant in 2002 who presents for routine follow-up.    1. Hyperlipidemia and hypertriglyceridemia: Lipids are currently at goal with LDL 91 and trig 146 in 5/2019. We discussed the importance of heart healthy diet and daily " exercise. Plan to continue zetia 10 mg, lovaza 1 mg BID and praluent 150 mg Q 14 days. She will have lipids drawn at her earliest convenience then annually thereafter.     2. Hypertension: Home blood pressures are elevated, agree with nephrologist to start losartan 25 mg daily. Continue metoprolol 50 mg XL and hydralazine PRN.    3. Paraoxysmal a-fib: No recent episodes of AF. Continue metoprolol XL 50 mg daily for rate control and Eliquis 2.5 mg BID for stroke prophylaxis     Phone call duration:  *** minutes    {signature options:[id number]}

## 2020-08-12 ENCOUNTER — VIRTUAL VISIT (OUTPATIENT)
Dept: CARDIOLOGY | Facility: CLINIC | Age: 70
End: 2020-08-12
Attending: NURSE PRACTITIONER
Payer: MEDICARE

## 2020-08-12 DIAGNOSIS — I10 HYPERTENSION GOAL BP (BLOOD PRESSURE) < 140/80: ICD-10-CM

## 2020-08-12 DIAGNOSIS — I48.0 PAROXYSMAL ATRIAL FIBRILLATION (H): ICD-10-CM

## 2020-08-12 DIAGNOSIS — I10 BENIGN ESSENTIAL HYPERTENSION: ICD-10-CM

## 2020-08-12 DIAGNOSIS — E78.1 HYPERTRIGLYCERIDEMIA: ICD-10-CM

## 2020-08-12 DIAGNOSIS — E78.5 HYPERLIPIDEMIA LDL GOAL <70: Primary | ICD-10-CM

## 2020-08-12 PROCEDURE — 99443 ZZC PHYSICIAN TELEPHONE EVALUATION 21-30 MIN: CPT | Performed by: NURSE PRACTITIONER

## 2020-08-12 RX ORDER — LOSARTAN POTASSIUM 25 MG/1
25 TABLET ORAL DAILY
Qty: 90 TABLET | Refills: 3 | Status: SHIPPED | OUTPATIENT
Start: 2020-08-12 | End: 2023-06-15

## 2020-08-12 ASSESSMENT — PAIN SCALES - GENERAL: PAINLEVEL: NO PAIN (0)

## 2020-08-12 NOTE — PATIENT INSTRUCTIONS
You were seen today in the Cardiovascular Clinic at the [facility].    Cardiology provider you saw during your visit Britt Oswald NP.    1. Continue current cholesterol medications.  2. Have fasting lipids drawn on 20  3. Your blood pressures are at goal - continue current regimen.  4. Call if you have more frequent or longer episodes of a-fib and we will discuss increasing metoprolol to 25 mg twice daily vs referral to the heart rhythm specialist.  5. Please make a follow-up appointment with Dr. Reyna in 6 months.    Questions and [phone].   First press #1 for the [email or web address] and then press #3 for Medical Questions to reach the Cardiology triage nurse.     On Call Cardiologist for after hours or on weekends: [phone], press option #4 and ask to speak to the on-call Cardiologist.

## 2020-08-12 NOTE — LETTER
8/12/2020      RE: Luz Thompson  [id number]  [place] [address]       Dear Colleague,    Thank you for the opportunity to participate in the care of your patient, Luz Thompson, at the [facility]. Please see a copy of my visit note below.    Luz Thompson is a 70 year old female who is being evaluated via a billable telephone visit.      CC: Annual f/up     HPI: Luz Thompson is a 70 year old with a history of paroxysmal a-fib on apixiban, hypertension, hyperlipidemia and hypertriglyceridemia w/statin intolerance, CKD and PBC s/p liver transplant in 2002 who presents for routine cardiac follow-up. She was intially referred to Dr. Reyna in 6/2017 for lipid management. At the time she was noted to have an elevated LDL (198), total cholesterol (322), and triglyceride (448) levels. She did not tolerate simvastatin or pravastatin due to myalgias. She was started on Zetia 10 mg which she tolerated but lipids remained significantly elevated. In 10/2018 she was started on Praluent 150 mg Q 14 days with optimal lipid control. She was recently switched to Repatha as her insurance would no longer cover Praluent.    Since she was last evaluated [place] has been doing well. She is now living in [place] year round. She has had about 4 brief episodes of atrial fibrillation over the past year. When she goes into a-fib she will sit down, relax and converts pretty quickly. The episodes are typically provoked by stress. She is moderately active working out with a  twice a week. She does light weight lifting and circuit training. She has not experienced any exertional chest pain or shortness of breath. She denies orthopnea, PND or weight gain. She reports mild lower extremity swelling which has been stable on lasix to 20 mg daily. She was experiencing hypotension and lightheadedness last year after she was started on losartan for  "her CKD. We reduced her metoprolol dose to 25 mg daily and she has not experienced recurrent lightheadedness. She has a history of syncope in the setting of dehydration but denies episodes over the past year.    Her home blood pressures have been at goal.  She is compliant with her current cardiac medications including Eliquis 2.5 mg BID, metoprolol XL 25 mg daily, losartan 25 mg daily, lasix 20 mg, hydralazine 25 mg PRN, Zetia 10 mg, Lovaza 1 mg BID, Repatha  140 Q 14 days.       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      Diverticulosis of sigmoid colon 12/21/2013     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     Received Rituxan during Summer of 2016     H/O esophageal varices      Hearing loss      Heart murmur 4/4/2012     History of DVT (deep vein thrombosis)      History of [place] fever      History of thyroid cancer 9/25/2012     Hyperlipidemia 4/10/2012    Says that she does not have it anymore, not on meds     Hyperlipidemia LDL goal <70      Hypertension goal BP (blood pressure) < 140/80 11/06/2013     Hypertriglyceridemia      Liver replaced by transplant (H) 10/17/2011    Dr. Gentry Ramirez, 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  Current Outpatient Medications   Medication     acetaminophen (TYLENOL) 325 MG tablet     apixaban ANTICOAGULANT (ELIQUIS ANTICOAGULANT) 2.5 MG tablet     blood glucose (ACCU-CHEK GUIDE) test strip     blood glucose monitoring (ACCU-CHEK FASTCLIX) lancets     calcitRIOL (ROCALTROL) 0.5 MCG capsule     ciprofloxacin-dexamethasone (CIPRODEX) 0.3-0.1 % otic suspension     clotrimazole (LOTRIMIN) 1 % external cream     estradiol (ESTRACE) 0.1 MG/GM vaginal cream     evolocumab (REPATHA) 140 MG/ML prefilled autoinjector     ezetimibe (ZETIA) 10 MG tablet     ferrous gluconate (FERGON) 324 (38 Fe) MG tablet     folic acid (FOLVITE) 1 MG tablet     furosemide (LASIX) 20 MG tablet     glucose (BD GLUCOSE) 5 g chewable tablet     glucose 40 % (400 mg/mL) gel     Guar Gum (FIBER MODULAR, NUTRISOURCE FIBER,) packet     hypromellose (ARTIFICIAL TEARS) 0.4 % SOLN ophthalmic solution     levothyroxine (SYNTHROID/LEVOTHROID) 150 MCG tablet     levothyroxine (SYNTHROID/LEVOTHROID) 175 MCG tablet     linagliptin (TRADJENTA) 5 MG TABS tablet     losartan (COZAAR) 25 MG tablet     meclizine (ANTIVERT) 25 MG tablet     metoprolol succinate ER (TOPROL-XL) 50 MG 24 hr tablet     metroNIDAZOLE (METROGEL) 0.75 % external gel     Multiple Vitamins-Minerals (PRESERVISION AREDS 2) CAPS     omega-3 acid ethyl esters (LOVAZA) 1 g capsule     order for DME     predniSONE (DELTASONE) 10 MG tablet     sennosides (SENOKOT) 8.6 MG tablet     sertraline (ZOLOFT) 100 MG tablet     sirolimus (GENERIC EQUIVALENT) 1 MG tablet     SUMAtriptan (IMITREX) 50 MG tablet     ursodiol (ACTIGALL) 250 MG tablet     voriconazole (VFEND) 200 MG tablet     cefpodoxime (VANTIN) 200 MG tablet     clotrimazole (MYCELEX) 10 MG LOZG lozenge     hydrALAZINE (APRESOLINE) 25 MG tablet     RAPAMUNE (BRAND) 1 MG tablet      STATIN NOT PRESCRIBED (INTENTIONAL)     ursodiol (ACTIGALL) 250 MG tablet     No current facility-administered medications for this visit.        ALLERGIES  Fluconazole; Azithromycin; Benadryl [diphenhydramine hcl]; Cellcept; Ciprofloxacin; Codeine; Diphenhydramine; Doxycycline; Lansoprazole; Levaquin [levofloxacin]; Lisinopril; Methotrexate; Morphine sulfate; Mycophenolate; Simvastatin; Cephalexin; Penicillin g; Tolectin [nsaids]; and Tramadol    ROS:  Review Of Systems  Skin: negative  Eyes: negative  Ears/Nose/Throat: negative  Respiratory: No shortness of breath, dyspnea on exertion, cough, or hemoptysis  Cardiovascular: See HPI  Gastrointestinal: negative  Genitourinary: negative  Musculoskeletal: negative  Neurologic: negative  Psychiatric: negative  Hematologic/Lymphatic/Immunologic: negative  Endocrine: negative    VS:  /84 pulse 64    Assessment/Plan:  Luz Thompson is a 70 year old with a past medical history significant for paroxysmal a-fib on apixiban, hypertension, hyperlipidemia and hypertriglyceridemia with statin intolerance, CKD and PBC s/p liver transplant in 2002 who presents for routine cardiac follow-up.    1. Hyperlipidemia and hypertriglyceridemia: Last lipid panel 5/2019 at goal with total chol 185, HDL 65, LDL 91 and trig 146. Patient is scheduled for repeat labs in [place] on 8/21 and will have them faxed to us. Continue zetia 10 mg, lovaza 1 mg BID and Repatha 140 mg Q 14 days. We discussed the importance of heart healthy diet and daily exercise.     2. Hypertension: Home blood pressures are at goal. Continue losartan 25 mg daily, metoprolol 25 mg XL daily and hydralazine PRN.    3. Paraoxysmal a-fib: Patient reports 4 brief episodes of a-fib over the past year. She converts fairly quickly and states that symptoms are tolerable. Continue metoprolol XL 25 mg daily for rate control and Eliquis 2.5 mg BID for stroke prophylaxis. Patient advised to call if she has more frequent  episodes of AF and we will increase her metoprolol to 25 mg BID (did not tolerate 50 mg daily due to hypotension). If persistent symptoms would refer back to EP.     Phone call duration: 30  minutes    Britt Oswald NP    RTC: 6 months with Dr. Reyna      Please do not hesitate to contact me if you have any questions/concerns.     Sincerely,     Britt Oswald NP

## 2020-08-12 NOTE — PROGRESS NOTES
"Luz Thompson is a 70 year old female who is being evaluated via a billable telephone visit.      The patient has been notified of following:     \"This telephone visit will be conducted via a call between you and your physician/provider. We have found that certain health care needs can be provided without the need for a physical exam.  This service lets us provide the care you need with a short phone conversation.  If a prescription is necessary we can send it directly to your pharmacy.  If lab work is needed we can place an order for that and you can then stop by our lab to have the test done at a later time.    If during the course of the call the physician/provider feels a telephone visit is not appropriate, you will not be charged for this service.\"     Patient has given verbal consent for Telephone visit?  Yes    How would you like to obtain your AVS? Haja     CC: Annual f/up     HPI: Luz Thompson is a 70 year old with a history of paroxysmal a-fib on apixiban, hypertension, hyperlipidemia and hypertriglyceridemia w/statin intolerance, CKD and PBC s/p liver transplant in 2002 who presents for routine cardiac follow-up. She was intially referred to Dr. Reyna in 6/2017 for lipid management. At the time she was noted to have an elevated LDL (198), total cholesterol (322), and triglyceride (448) levels. She did not tolerate simvastatin or pravastatin due to myalgias. She was started on Zetia 10 mg which she tolerated but lipids remained significantly elevated. In 10/2018 she was started on Praluent 150 mg Q 14 days with optimal lipid control. She was recently switched to Repatha as her insurance would no longer cover Praluent.    Since she was last evaluated [place] has been doing well. She is now living in [place] year round. She has had about 4 brief episodes of atrial fibrillation over the past year. When she goes into a-fib she will sit down, relax and converts pretty quickly. The episodes are " "typically provoked by stress. She is moderately active working out with a  twice a week. She does light weight lifting and circuit training. She has not experienced any exertional chest pain or shortness of breath. She denies orthopnea, PND or weight gain. She reports mild lower extremity swelling which has been stable on lasix to 20 mg daily. She was experiencing hypotension and lightheadedness last year after she was started on losartan for her CKD. We reduced her metoprolol dose to 25 mg daily and she has not experienced recurrent lightheadedness. She has a history of syncope in the setting of dehydration but denies episodes over the past year.    Her home blood pressures have been at goal.  She is compliant with her current cardiac medications including Eliquis 2.5 mg BID, metoprolol XL 25 mg daily, losartan 25 mg daily, lasix 20 mg, hydralazine 25 mg PRN, Zetia 10 mg, Lovaza 1 mg BID, Repatha  140 Q 14 days.       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      Diverticulosis of sigmoid colon 12/21/2013     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     Received Rituxan during Summer of 2016     H/O esophageal varices      Hearing loss      Heart murmur 4/4/2012     History of DVT (deep vein thrombosis)      History of [facility] fever      History of thyroid cancer 9/25/2012     Hyperlipidemia 4/10/2012    Says that she does not have it anymore, not on meds     Hyperlipidemia LDL goal <70      Hypertension goal BP (blood pressure) < 140/80 11/06/2013     Hypertriglyceridemia      Liver replaced " by transplant (H) 10/17/2011    Dr. 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  Current Outpatient Medications   Medication     acetaminophen (TYLENOL) 325 MG tablet     apixaban ANTICOAGULANT (ELIQUIS ANTICOAGULANT) 2.5 MG tablet     blood glucose (ACCU-CHEK GUIDE) test strip     blood glucose monitoring (ACCU-CHEK FASTCLIX) lancets     calcitRIOL (ROCALTROL) 0.5 MCG capsule     ciprofloxacin-dexamethasone (CIPRODEX) 0.3-0.1 % otic suspension     clotrimazole (LOTRIMIN) 1 % external cream     estradiol (ESTRACE) 0.1 MG/GM vaginal cream     evolocumab (REPATHA) 140 MG/ML prefilled autoinjector     ezetimibe (ZETIA) 10 MG tablet     ferrous gluconate (FERGON) 324 (38 Fe) MG tablet     folic acid (FOLVITE) 1 MG tablet     furosemide (LASIX) 20 MG tablet     glucose (BD GLUCOSE) 5 g chewable tablet     glucose 40 % (400 mg/mL) gel     Guar Gum (FIBER MODULAR, NUTRISOURCE FIBER,) packet     hypromellose (ARTIFICIAL TEARS) 0.4 % SOLN ophthalmic solution     levothyroxine (SYNTHROID/LEVOTHROID) 150 MCG tablet     levothyroxine (SYNTHROID/LEVOTHROID) 175 MCG tablet     linagliptin (TRADJENTA) 5 MG TABS tablet     losartan (COZAAR) 25 MG tablet     meclizine (ANTIVERT) 25 MG tablet     metoprolol succinate ER (TOPROL-XL) 50 MG 24 hr tablet     metroNIDAZOLE (METROGEL) 0.75 % external gel     Multiple Vitamins-Minerals (PRESERVISION AREDS 2) CAPS     omega-3 acid ethyl esters (LOVAZA) 1 g capsule     order for  DME     predniSONE (DELTASONE) 10 MG tablet     sennosides (SENOKOT) 8.6 MG tablet     sertraline (ZOLOFT) 100 MG tablet     sirolimus (GENERIC EQUIVALENT) 1 MG tablet     SUMAtriptan (IMITREX) 50 MG tablet     ursodiol (ACTIGALL) 250 MG tablet     voriconazole (VFEND) 200 MG tablet     cefpodoxime (VANTIN) 200 MG tablet     clotrimazole (MYCELEX) 10 MG LOZG lozenge     hydrALAZINE (APRESOLINE) 25 MG tablet     RAPAMUNE (BRAND) 1 MG tablet     STATIN NOT PRESCRIBED (INTENTIONAL)     ursodiol (ACTIGALL) 250 MG tablet     No current facility-administered medications for this visit.        ALLERGIES  Fluconazole; Azithromycin; Benadryl [diphenhydramine hcl]; Cellcept; Ciprofloxacin; Codeine; Diphenhydramine; Doxycycline; Lansoprazole; Levaquin [levofloxacin]; Lisinopril; Methotrexate; Morphine sulfate; Mycophenolate; Simvastatin; Cephalexin; Penicillin g; Tolectin [nsaids]; and Tramadol    ROS:  Review Of Systems  Skin: negative  Eyes: negative  Ears/Nose/Throat: negative  Respiratory: No shortness of breath, dyspnea on exertion, cough, or hemoptysis  Cardiovascular: See HPI  Gastrointestinal: negative  Genitourinary: negative  Musculoskeletal: negative  Neurologic: negative  Psychiatric: negative  Hematologic/Lymphatic/Immunologic: negative  Endocrine: negative    VS:  /84 pulse 64    Assessment/Plan:  Luz Thompson is a 70 year old with a past medical history significant for paroxysmal a-fib on apixiban, hypertension, hyperlipidemia and hypertriglyceridemia with statin intolerance, CKD and PBC s/p liver transplant in 2002 who presents for routine cardiac follow-up.    1. Hyperlipidemia and hypertriglyceridemia: Last lipid panel 5/2019 at goal with total chol 185, HDL 65, LDL 91 and trig 146. Patient is scheduled for repeat labs in [place] on 8/21 and will have them faxed to us. Continue zetia 10 mg, lovaza 1 mg BID and Repatha 140 mg Q 14 days. We discussed the importance of heart healthy diet and daily  exercise.     2. Hypertension: Home blood pressures are at goal. Continue losartan 25 mg daily, metoprolol 25 mg XL daily and hydralazine PRN.    3. Paraoxysmal a-fib: Patient reports 4 brief episodes of a-fib over the past year. She converts fairly quickly and states that symptoms are tolerable. Continue metoprolol XL 25 mg daily for rate control and Eliquis 2.5 mg BID for stroke prophylaxis. Patient advised to call if she has more frequent episodes of AF and we will increase her metoprolol to 25 mg BID (did not tolerate 50 mg daily due to hypotension). If persistent symptoms would refer back to EP.     Phone call duration: 30  minutes    Britt Oswald NP    RTC: 6 months with Dr. Reyna

## 2020-08-17 ENCOUNTER — TELEPHONE (OUTPATIENT)
Dept: TRANSPLANT | Facility: CLINIC | Age: 70
End: 2020-08-17

## 2020-08-17 DIAGNOSIS — F43.21 ADJUSTMENT DISORDER WITH DEPRESSED MOOD: ICD-10-CM

## 2020-08-17 RX ORDER — SERTRALINE HYDROCHLORIDE 100 MG/1
TABLET, FILM COATED ORAL
Qty: 30 TABLET | Refills: 0 | Status: SHIPPED | OUTPATIENT
Start: 2020-08-17 | End: 2022-10-12

## 2020-08-17 NOTE — TELEPHONE ENCOUNTER
Patient Call: Voicemail  Date/Time: 081720 10:49 am  Reason for call: Patient is calling for a refill of Zoloft, just enough to get by before she meets with her primary care provider on 8/27/20, please call her at [phone]

## 2020-08-18 DIAGNOSIS — E78.1 HYPERTRIGLYCERIDEMIA: ICD-10-CM

## 2020-08-20 RX ORDER — OMEGA-3-ACID ETHYL ESTERS 1 G/1
1 CAPSULE, LIQUID FILLED ORAL 2 TIMES DAILY
Qty: 180 CAPSULE | Refills: 0 | Status: SHIPPED | OUTPATIENT
Start: 2020-08-20 | End: 2020-10-28

## 2020-08-21 NOTE — TELEPHONE ENCOUNTER
"omega-3 acid ethyl esters (LOVAZA) 1 g capsule     Last written Prescription Date:  8/31/2019  Last Fill Quantity: 180,   # refills: 3  Last Office Visit : 8/12/20 Oswald  Future Office visit:  9/16/20    Routing refill request to provider for review/approval because:  LIpid panel due- last 5/20/2019  ABN ALT  08/30/19  0657    ALT 72*   8/12/20> plan, excerpted  \"1. Continue current cholesterol medications.  2. Have fasting lipids drawn on 8/21/20\"   90 RF sent x 1 now.      "

## 2020-08-25 DIAGNOSIS — E89.2 POSTABLATIVE HYPOPARATHYROIDISM (H): ICD-10-CM

## 2020-08-25 DIAGNOSIS — E78.5 HYPERLIPIDEMIA LDL GOAL <70: ICD-10-CM

## 2020-08-25 DIAGNOSIS — I10 BENIGN ESSENTIAL HYPERTENSION: ICD-10-CM

## 2020-08-28 RX ORDER — CALCITRIOL 0.5 UG/1
0.5 CAPSULE, LIQUID FILLED ORAL DAILY
Qty: 90 CAPSULE | Refills: 3 | Status: SHIPPED | OUTPATIENT
Start: 2020-08-28 | End: 2024-09-09

## 2020-08-28 NOTE — TELEPHONE ENCOUNTER
calcitRIOL (ROCALTROL) 0.5 MCG capsule  Take 1 capsule (0.5 mcg) by mouth daily  Last Written Prescription Date:  2/13/20  Last Fill Quantity: 90,   # refills: 1  Last Office Visit : 4/20/20 virtual visit   Future Office visit:  none    Routing refill request to provider for review/approval because:  Med not on refill protocol

## 2020-08-31 NOTE — TELEPHONE ENCOUNTER
EZETIMIBE 10MG TABLETS       Last Written Prescription Date:  TRANSITIONAL    Last Office Visit : 8-12-20  Future Office visit:  9-16-20    Routing refill request to provider for review/approval because:  Medication is reported/historical/transitional  Overdue labs: Cr, ALT   ( future orders is epic)

## 2020-09-05 NOTE — PATIENT INSTRUCTIONS
Triamcinolone apply to both upper and lower eyelids twice a day , avoid contact with eyes for 1 week   Follow up in 1 week if not better     Wear brace   Make appointment with ortho for steroid injection  Continue Tylenol as needed   Patient Education     De Quervain Tenosynovitis    De Quervain tenosynovitis is inflammation of tendons and synovium on the thumb side of the wrist. Tendons are fibers that attach muscle to bone. Synovium is a slick membrane that helps tendons move. Movements done over and over can irritate and inflame these tissues. This can cause pain when you touch or grasp objects, turn or twist your wrist, or make a fist. You may also have pain and swelling near the base of the thumb or numbness along the back of your thumb and index finger. You may also feel the thumb catch or snap when you move it.  Treatment will depend on how bad the symptoms are. It can often be treated with medicines, injections, splinting, and home care. If your case is severe, you may be referred to a specialist to talk about surgery.  Home care  Your healthcare provider may prescribe medicines to relieve pain and reduce inflammation. A steroid medicine may be injected near the tendons. This reduces swelling and pain. The healthcare provider may also suggest taking over-the-counter medicines such as ibuprofen or naproxen. These help reduce inflammation. Take all medicines only as directed.  The following are general care guidelines:    Avoid repetitive movements of your wrist and thumb.    Note any activity that causes pain or swelling. If possible, avoid or limit that activity.    Put a cold pack on your thumb. To make an ice pack, put ice cubes in a plastic bag that seals at the top. Wrap the bag in a clean, thin towel or cloth. Hold this to your thumb for up to 20 minutes at a time. Don't put ice directly on the skin.    Your healthcare provider may put a splint on the thumb to hold it still. Use the splint as you have  been instructed. In some cases, you may need to use a splint 24 hours a day for 4 to 6 weeks. This will allow the wrist and thumb to heal.  Follow-up care  Follow up with your healthcare provider, or as advised. You may need more treatment if your injury is severe or if your symptoms don't get better. This additional treatment may include local injections, physical therapy, and surgery.  When to seek medical advice  Call your healthcare provider right away if any of these occur:    Increase in pain or swelling    You have fever, chills, redness, warmth, or drainage    Symptoms get worse after taking medicine    Pain spreads farther down the thumb or into the forearm    Pain continues to get in the way of daily life  Date Last Reviewed: 6/1/2018 2000-2018 [employer]. [address] [place] [address], [place] [address]. All rights reserved. This information is not intended as a substitute for professional medical care. Always follow your healthcare professional's instructions.            DISPLAY PLAN FREE TEXT DISPLAY PLAN FREE TEXT

## 2020-09-08 ENCOUNTER — TELEPHONE (OUTPATIENT)
Dept: TRANSPLANT | Facility: CLINIC | Age: 70
End: 2020-09-08

## 2020-09-08 ENCOUNTER — MEDICAL CORRESPONDENCE (OUTPATIENT)
Dept: HEALTH INFORMATION MANAGEMENT | Facility: CLINIC | Age: 70
End: 2020-09-08

## 2020-09-08 DIAGNOSIS — Z94.4 LIVER REPLACED BY TRANSPLANT (H): Primary | ICD-10-CM

## 2020-09-12 RX ORDER — EZETIMIBE 10 MG/1
TABLET ORAL
Qty: 90 TABLET | Refills: 3 | Status: SHIPPED | OUTPATIENT
Start: 2020-09-12 | End: 2024-10-04

## 2020-09-15 NOTE — NURSING NOTE
Vitals were taken and medications reconciled.     Mathew Munson CMA  1:00 PM    
Action 3: Continue
Sig For Treatment 1 (If Needed): every 2 weeks
Detail Level: Zone
Treatment 1: Humira 40 mg injected every other week

## 2020-09-16 DIAGNOSIS — E78.5 HYPERLIPIDEMIA LDL GOAL <70: ICD-10-CM

## 2020-09-16 DIAGNOSIS — E11.22 TYPE 2 DIABETES MELLITUS WITH STAGE 3 CHRONIC KIDNEY DISEASE, WITHOUT LONG-TERM CURRENT USE OF INSULIN (H): ICD-10-CM

## 2020-09-16 DIAGNOSIS — E78.1 HYPERTRIGLYCERIDEMIA: ICD-10-CM

## 2020-09-16 DIAGNOSIS — N18.30 TYPE 2 DIABETES MELLITUS WITH STAGE 3 CHRONIC KIDNEY DISEASE, WITHOUT LONG-TERM CURRENT USE OF INSULIN (H): ICD-10-CM

## 2020-09-16 DIAGNOSIS — Z86.73 H/O: CVA (CEREBROVASCULAR ACCIDENT): ICD-10-CM

## 2020-09-16 DIAGNOSIS — E78.6 HDL DEFICIENCY: ICD-10-CM

## 2020-09-16 DIAGNOSIS — Z78.9 STATIN INTOLERANCE: ICD-10-CM

## 2020-09-20 NOTE — TELEPHONE ENCOUNTER
REPATHA SURECLICK 140MG/ML SOAJ       Last Written Prescription Date:  2/7/20  Last Fill Quantity: 6ml,   # refills: 1   Last Office Visit : Britt Oswald NP   Nurse Practitioner - Adult Health  8/12/2020  [facility]  Future Office visit:  11/13/20 with Guillermo Obrien refill request to provider for review/approval because:  Drug not on the [facility], [facility] or [facility] refill protocol

## 2020-09-22 RX ORDER — EVOLOCUMAB 140 MG/ML
140 INJECTION, SOLUTION SUBCUTANEOUS
Qty: 6 ML | Refills: 0 | Status: SHIPPED | OUTPATIENT
Start: 2020-09-22 | End: 2024-09-03

## 2020-09-22 NOTE — TELEPHONE ENCOUNTER
Please send Repatha refill ASAP- we haven't filled it since 6/16/2020    Thank you!  [facility]

## 2020-10-19 LAB
ALBUMIN SERPL-MCNC: 3.5 G/DL
ALP SERPL-CCNC: 152 U/L
ALT SERPL-CCNC: 78 U/L
AST SERPL-CCNC: 55 U/L
BILIRUB SERPL-MCNC: 0.3 MG/DL
BUN SERPL-MCNC: 19 MG/DL
CALCIUM SERPL-MCNC: 8.8 MG/DL
CHLORIDE SERPLBLD-SCNC: 105 MMOL/L
CHOL/HDL RATIO - HISTORICAL: 4.7
CHOLEST SERPL-MCNC: 197 MG/DL
CO2 SERPL-SCNC: 29 MMOL/L
CREAT SERPL-MCNC: 1.3 MG/DL
ERYTHROCYTE [DISTWIDTH] IN BLOOD BY AUTOMATED COUNT: 17.2 %
GFR SERPL CREATININE-BSD FRML MDRD: 44.49 ML/MIN/1.73M2
GLUCOSE SERPL-MCNC: 151 MG/DL (ref 70–99)
HCT VFR BLD AUTO: 35 %
HDLC SERPL-MCNC: 42 MG/DL
HEMOGLOBIN: 11.2 G/DL (ref 11.7–15.7)
LDLC SERPL CALC-MCNC: 78 MG/DL
MCH RBC QN AUTO: 29.5 PG
MCHC RBC AUTO-ENTMCNC: 32 G/DL
MCV RBC AUTO: 92 FL
PLATELET # BLD AUTO: 388 10^9/L
POTASSIUM SERPL-SCNC: 4.4 MMOL/L
PROT SERPL-MCNC: 6.4 G/DL
RBC # BLD AUTO: 3.8 10^12/L
SODIUM SERPL-SCNC: 141 MMOL/L
TRIGL SERPL-MCNC: 432 MG/DL
WBC # BLD AUTO: 7.1 10^9/L

## 2020-10-24 DIAGNOSIS — E78.1 HYPERTRIGLYCERIDEMIA: ICD-10-CM

## 2020-10-26 DIAGNOSIS — I48.0 PAROXYSMAL ATRIAL FIBRILLATION (H): ICD-10-CM

## 2020-10-28 RX ORDER — OMEGA-3-ACID ETHYL ESTERS 1 G/1
CAPSULE, LIQUID FILLED ORAL
Qty: 180 CAPSULE | Refills: 0 | Status: SHIPPED | OUTPATIENT
Start: 2020-10-28 | End: 2021-03-29

## 2020-10-28 NOTE — TELEPHONE ENCOUNTER
Rx sent.    reji William, Taniya Berrios, APRN Melissa Carbajal, RN             Would keep at 2.5 mg BID has had variable renal fx and did have a GIB historically I believe.   Thanks,  LVW            Patient currently on Eliquis 2.5mg BID. Will review with Taniya Mandujano NP to see if Eliquis dose should be changed to 5mg BID.    Age: 70  Weight: 83.9kg   Ref. Range 9/18/2020 00:00   Creatinine Latest Units: mg/dL 1.3       Per  pharmacy guidelines:  Usual dose is 5mg BID for stroke prevention in AF.  Decrease dose to 2.5 po BID if patient has 2 of the following 3 things: 1. Scr? 1.5 mg/dL, 2. age? 79 years old, or 3. body weight ? 60kg

## 2020-10-28 NOTE — TELEPHONE ENCOUNTER
apixaban ANTICOAGULANT (ELIQUIS ANTICOAGULANT) 2.5 MG tablet    Last Written Prescription Date:  7/9/2020  Last Fill Quantity: 180,   # refills: 0  Last Office Visit : 8/12/2020  Future Office visit:  11/13/2020    Routing refill request to provider for review/approval because:  Refer to clinic for review.    Weight greater then 60 Kg for the past year.    Rach Rivera RN  Central Triage Red Flags/Med Refills      Rerun Protocol (10/28/2020 8:24 AM)     Weight is greater than 60 kg for the past year        Wt Readings from Last 3 Encounters:   09/19/19 83.9 kg (185 lb)   09/13/19 83.2 kg (183 lb 6.4 oz)   09/11/19 83.2 kg (183 lb 6.4 oz)

## 2020-10-28 NOTE — TELEPHONE ENCOUNTER
omega-3 acid 1 G  Last Written Prescription Date:  8/20/20  Last Fill Quantity: 180,   # refills: 0  Last Office Visit : 8/12/20  Future Office visit:  11/13/20  Routing refill request to provider for review/approval because:  Drug not on the refill protocol

## 2020-11-03 ENCOUNTER — TELEPHONE (OUTPATIENT)
Dept: TRANSPLANT | Facility: CLINIC | Age: 70
End: 2020-11-03

## 2020-11-03 NOTE — TELEPHONE ENCOUNTER
Pt had a fall and was on floor after 2 days before someone found her. She was weak and dehydrated and transported.Hospitalized 9/30-10/4  and she doesn't even remember falling. holter monitor 24 hours. Stress test on Monday then 30 day holter monitor.     She also has a Sore on right ankle bone that she has had a month and it's not improving. 2 pencil erasers in size. She is seeing a podiatrist. Patient wondering if she should come off rapamune. Has an appt coming up in next week with podiatrist.

## 2020-11-03 NOTE — TELEPHONE ENCOUNTER
Patient Call: General  Route to LPN    Reason for call: connect with pt regarding a few things.     Call back needed? Yes    Return Call Needed  Same as documented in contacts section  When to return call?: Greater than one day: Route standard priority

## 2020-11-06 DIAGNOSIS — N18.30 TYPE 2 DIABETES MELLITUS WITH STAGE 3 CHRONIC KIDNEY DISEASE, WITHOUT LONG-TERM CURRENT USE OF INSULIN (H): ICD-10-CM

## 2020-11-06 DIAGNOSIS — E78.5 HYPERLIPIDEMIA LDL GOAL <70: ICD-10-CM

## 2020-11-06 DIAGNOSIS — I10 BENIGN ESSENTIAL HYPERTENSION: ICD-10-CM

## 2020-11-06 DIAGNOSIS — E11.22 TYPE 2 DIABETES MELLITUS WITH STAGE 3 CHRONIC KIDNEY DISEASE, WITHOUT LONG-TERM CURRENT USE OF INSULIN (H): ICD-10-CM

## 2020-11-10 RX ORDER — EZETIMIBE 10 MG/1
TABLET ORAL
Qty: 90 TABLET | Refills: 3 | OUTPATIENT
Start: 2020-11-10

## 2020-11-10 NOTE — TELEPHONE ENCOUNTER
linagliptin (TRADJENTA) 5 MG TABS tablet  Last Written Prescription Date:  9/3/2019  Last Fill Quantity: 90,   # refills: 3  Last Office Visit : 4/20/20  Future Office visit:  None    Routing refill request to provider/ staff for review/approval because:  Recent hospitalization ABN labs>outside/ Epic 10/2/20  Hemoglobin A1C <5.7 % 6.2High       Creatinine 0.5 - 1.0 mg/dL 2.3High          [place], [place]  10/2/20( admitted for:Acute kidney injury     Non-traumatic rhabdomyolysis    Generalized weakness    Fall from ground level    Hypertension, essential    Acquired hypothyroidism    Hx of liver transplant    Paroxysmal atrial fibrillation    Elevated troponin    SIRS (systemic inflammatory response syndrome)    Hypokalemia    Elevated liver enzymes    Coccidioidomycosis    Syncope and collapse    History of CVA (cerebrovascular accident)    Fever, unspecified fever cause    Immunocompromised state  )

## 2020-11-10 NOTE — TELEPHONE ENCOUNTER
EZETIMIBE 10MG TABLETS  ezetimibe (ZETIA) 10 MG tablet 90 tablet 3 9/12/2020  No   Sig: TAKE 1 TABLET EVERY DAY   Sent to pharmacy as: Ezetimibe 10 MG Oral Tablet (ZETIA)   Class: E-Prescribe   Order: [id number]   E-Prescribing Status: Receipt confirmed by pharmacy (9/12/2020  3:46 PM CDT)   Printout Tracking    External Result Report   Pharmacy    [id number] [place] [facility] [address] AT [address] & [facility]           Rach Rivera RN  Central Triage Red Flags/Med Refills

## 2020-11-11 RX ORDER — LINAGLIPTIN 5 MG/1
5 TABLET, FILM COATED ORAL DAILY
Qty: 90 TABLET | Refills: 1 | Status: SHIPPED | OUTPATIENT
Start: 2020-11-11

## 2020-11-11 NOTE — TELEPHONE ENCOUNTER
Pt aware that she does not need to come off the rapamune.  Pt was unable to do stress test due to vertigo. Pt is rescheduled for Dec 3

## 2020-12-30 DIAGNOSIS — Z94.4 LIVER REPLACED BY TRANSPLANT (H): ICD-10-CM

## 2020-12-30 RX ORDER — FOLIC ACID 1 MG/1
1 TABLET ORAL DAILY
Qty: 100 TABLET | Refills: 0 | Status: SHIPPED | OUTPATIENT
Start: 2020-12-30

## 2021-01-15 ENCOUNTER — HEALTH MAINTENANCE LETTER (OUTPATIENT)
Age: 71
End: 2021-01-15

## 2021-02-01 ENCOUNTER — TELEPHONE (OUTPATIENT)
Dept: TRANSPLANT | Facility: CLINIC | Age: 71
End: 2021-02-01

## 2021-02-01 NOTE — TELEPHONE ENCOUNTER
"Pt had a fall and they did a chest xray, but they are showing1.7 cm \"something in lower lobe of right lung\"   Pt encouraged to have CT completed. Pt will call back with any updates. Pt reports that it is already ordered and needs to be scheduled. Pt will update with results.  "

## 2021-02-05 ENCOUNTER — TELEPHONE (OUTPATIENT)
Dept: TRANSPLANT | Facility: CLINIC | Age: 71
End: 2021-02-05

## 2021-02-05 DIAGNOSIS — Z94.4 LIVER REPLACED BY TRANSPLANT (H): ICD-10-CM

## 2021-02-05 DIAGNOSIS — Z13.220 LIPID SCREENING: ICD-10-CM

## 2021-02-05 NOTE — TELEPHONE ENCOUNTER
Patient Call: Transplant Lab/Orders    Reason for Call: Annual lab reorder  Callback needed? No     Please fax to [facility] @ [phone]

## 2021-03-24 DIAGNOSIS — E78.1 HYPERTRIGLYCERIDEMIA: Primary | ICD-10-CM

## 2021-03-29 RX ORDER — OMEGA-3-ACID ETHYL ESTERS 1 G/1
1 CAPSULE, LIQUID FILLED ORAL 2 TIMES DAILY
Qty: 180 CAPSULE | Refills: 1 | Status: SHIPPED | OUTPATIENT
Start: 2021-03-29

## 2021-03-31 ENCOUNTER — TELEPHONE (OUTPATIENT)
Dept: CARDIOLOGY | Facility: CLINIC | Age: 71
End: 2021-03-31

## 2021-03-31 DIAGNOSIS — I48.0 PAROXYSMAL ATRIAL FIBRILLATION (H): ICD-10-CM

## 2021-03-31 NOTE — TELEPHONE ENCOUNTER
LVM for patient to call back to schedule follow up appointment. Mychart message sent to patient as a reminder to call and schedule follow up appointment as well.

## 2021-03-31 NOTE — TELEPHONE ENCOUNTER
[facility] Call Center    Phone Message    May a detailed message be left on voicemail: yes     Reason for Call: Medication Refill Request    Has the patient contacted the pharmacy for the refill? Yes   Name of medication being requested: apixaban ANTICOAGULANT (ELIQUIS ANTICOAGULANT) 2.5 MG tablet  Provider who prescribed the medication: AMITA Rivera  Pharmacy: [facility] #[id number] - [place], [place] - [address]  AT [address] [facility]  Date medication is needed: ASA, pharmacy states they have been faxing the refill request since 3/25/21         Action Taken: Message routed to:  [facility] Cardio    Travel Screening: Not Applicable

## 2021-04-01 DIAGNOSIS — E89.2 POSTABLATIVE HYPOPARATHYROIDISM (H): ICD-10-CM

## 2021-04-01 RX ORDER — LEVOTHYROXINE SODIUM 175 UG/1
175 TABLET ORAL EVERY OTHER DAY
Qty: 45 TABLET | Refills: 0 | Status: SHIPPED | OUTPATIENT
Start: 2021-04-01 | End: 2023-06-15

## 2021-04-01 NOTE — TELEPHONE ENCOUNTER
levothyroxine (SYNTHROID/LEVOTHROID) 175 MCG tablet  Last Written Prescription Date:  4/24/20  Last Fill Quantity: 45,   # refills: 3  Last Office Visit : 4/20/20  Future Office visit:  none      Care Everywhere:   10/2/20 [facility]  TSH 3.040     90 RF sent. Annual appt due April 2021

## 2021-04-16 ENCOUNTER — TELEPHONE (OUTPATIENT)
Dept: CARDIOLOGY | Facility: CLINIC | Age: 71
End: 2021-04-16

## 2021-04-16 NOTE — TELEPHONE ENCOUNTER
[facility] Call Center    Phone Message    May a detailed message be left on voicemail: yes     Reason for Call: Other: Pt called to update that she is Living in [place] and has cardiologist there. Update only.     Action Taken: Message routed to:  [facility] izaiah cardio    Travel Screening: Not Applicable

## 2021-04-27 DIAGNOSIS — E89.2 POSTABLATIVE HYPOPARATHYROIDISM (H): ICD-10-CM

## 2021-04-28 RX ORDER — LEVOTHYROXINE SODIUM 150 UG/1
150 TABLET ORAL EVERY OTHER DAY
Qty: 45 TABLET | Refills: 0 | Status: SHIPPED | OUTPATIENT
Start: 2021-04-28 | End: 2023-06-15

## 2021-04-28 NOTE — TELEPHONE ENCOUNTER
Last Clinic Visit: 4/20/20 no upcoming appointments.  Last TSH is care everywhere 10/2/20 at 3.040  90 day refill provided, routed to clinic scheduling for follow up

## 2021-05-07 DIAGNOSIS — Z94.4 LIVER REPLACED BY TRANSPLANT (H): ICD-10-CM

## 2021-05-07 RX ORDER — SIROLIMUS 1 MG/1
1 TABLET, FILM COATED ORAL EVERY OTHER DAY
Qty: 45 TABLET | Refills: 3 | Status: SHIPPED | OUTPATIENT
Start: 2021-05-07 | End: 2021-05-07

## 2021-05-07 RX ORDER — SIROLIMUS 1 MG/1
1 TABLET, FILM COATED ORAL EVERY OTHER DAY
Qty: 45 TABLET | Refills: 3 | Status: SHIPPED | OUTPATIENT
Start: 2021-05-07 | End: 2021-08-13

## 2021-05-07 NOTE — TELEPHONE ENCOUNTER
Patient Call: Medication Refill  Route to LPN  Instruct the patient to first contact their pharmacy. If they have called their pharmacy and require further assistance, route to LPN.      sirolimus (GENERIC EQUIVALENT) 1 MG tablet    [id number] - [place], [place] - [address] AT [address] [facility] Phone:  [phone]   Fax:  [phone]            When will the patient be out of this medication?: Less than 3 days (Route high priority)

## 2021-05-17 ENCOUNTER — MYC MEDICAL ADVICE (OUTPATIENT)
Dept: INFECTIOUS DISEASES | Facility: CLINIC | Age: 71
End: 2021-05-17

## 2021-05-17 DIAGNOSIS — B38.2 COCCIDIOIDAL PNEUMONITIS (H): ICD-10-CM

## 2021-05-18 RX ORDER — VORICONAZOLE 200 MG/1
200 TABLET, FILM COATED ORAL 2 TIMES DAILY
Qty: 180 TABLET | Refills: 3 | Status: SHIPPED | OUTPATIENT
Start: 2021-05-18 | End: 2021-05-18

## 2021-05-18 RX ORDER — VORICONAZOLE 200 MG/1
200 TABLET, FILM COATED ORAL 2 TIMES DAILY
Qty: 180 TABLET | Refills: 3 | Status: SHIPPED | OUTPATIENT
Start: 2021-05-18 | End: 2022-05-24

## 2021-06-08 ENCOUNTER — TELEPHONE (OUTPATIENT)
Dept: TRANSPLANT | Facility: CLINIC | Age: 71
End: 2021-06-08

## 2021-06-08 NOTE — TELEPHONE ENCOUNTER
Pt is going to transfer some of care to [place] in [place].   Pt will require injections to knees.    Statement Selected

## 2021-06-10 ENCOUNTER — TELEPHONE (OUTPATIENT)
Dept: TRANSPLANT | Facility: CLINIC | Age: 71
End: 2021-06-10

## 2021-06-10 NOTE — TELEPHONE ENCOUNTER
"Patient Call: General  Route to LPN    Reason for call: connect with pt regarding needing a letter for ortho regarding it being okay for pt to have \"jelly infections into her knee\"     [facility] orthoptics   Fax# [phone]    Call back needed? Yes    Return Call Needed  Same as documented in contacts section  When to return call?: Now: [place] RN first, if no answer Page  "

## 2021-06-11 ENCOUNTER — TELEPHONE (OUTPATIENT)
Dept: TRANSPLANT | Facility: CLINIC | Age: 71
End: 2021-06-11

## 2021-06-11 NOTE — TELEPHONE ENCOUNTER
Patient Call: Voicemail  Date/Time: 6/11/21 @ 8:55 am  Reason for call:  Patient called to say when she was transferred to the LPN the call went to the  and she left a message.

## 2021-06-14 ENCOUNTER — TELEPHONE (OUTPATIENT)
Dept: TRANSPLANT | Facility: CLINIC | Age: 71
End: 2021-06-14

## 2021-06-14 DIAGNOSIS — Z94.4 LIVER REPLACED BY TRANSPLANT (H): Primary | ICD-10-CM

## 2021-06-14 NOTE — LETTER
OUTPATIENT OR TRANSITIONAL CARE  LABORATORY TEST ORDER  Lab [id number]    Patient Name: Luz Thompson  Transplant Date: 5/22/2002   YOB: 1950  Issue Date: 06/18/21   [facility] MR#:[id number]  Expiration Date: 06/18/22      Diagnoses: [x]      Liver Transplant (ICD-10 Z94.4)    [x]      Long term use of medications (ICD-10 Z79.899)     Please fax results to [phone]    Frequency: weekly x 4 weeks, every other week x2, monthly x 4 then every other month     [x] CBC with Platelets   [x] Basic Metabolic Panel (Sodium, Potassium, Chloride, CO2, Creatinine, Urea Nitrogen, Glucose, Calcium)  [x]  Magnesium  [x] Hepatic panel (Albumin, Alk Phos, ALT, AST, Direct and Total Bili)  [x] Tacrolimus drug level - 12 hour trough, please document time of last dose       If you have questions, please call [phone] or [phone].    .

## 2021-06-14 NOTE — LETTER
Luz Thompson  [address]  [place] [address]            Rahel 15, 2021    To whom it may concern,    Luz Thompson  1950 is able to have knee injections. Pt stated she is having supartz fx knee injections.There are no interactions with medications or her liver transplant.     If you have any questions please contact my office [phone] option [phone].    Thank you,  .

## 2021-06-14 NOTE — TELEPHONE ENCOUNTER
Patient called to discuss changing out her Sirolimus medication it not helping with some healing. Patient has another question that the LPN was helping her with no details given on that questions.

## 2021-06-15 NOTE — TELEPHONE ENCOUNTER
Pt has had a sore on foot since October that is not closing and wants to know if should switch off sirolimus. Message to dr hernandez.    Pt also is having dental implants done in November or Dec.    Pt also would want a letter that it is ok to have injections into knee. Letter faxed to [phone] and also sent to patient via GBS.

## 2021-06-17 RX ORDER — TACROLIMUS 0.5 MG/1
0.5 CAPSULE ORAL 2 TIMES DAILY
Qty: 60 CAPSULE | Refills: 1 | Status: SHIPPED | OUTPATIENT
Start: 2021-06-17 | End: 2022-02-07

## 2021-06-17 NOTE — TELEPHONE ENCOUNTER
Patient called to give fax number to Mitchell Fernando [phone] also patient wanted to see if she could get an anti biotic for a UTI for 5 days until she is able to get if from her provider.

## 2021-06-17 NOTE — TELEPHONE ENCOUNTER
Pt to switch to tacro due to slow wound healing on foot. Pt will call back with which lab she prefers to have orders sent to.   Pt will start tacro 0.5 mg BID and do labs next week. Pt to do labs weekly until level stable then every 2 weeks.

## 2021-06-24 ENCOUNTER — TELEPHONE (OUTPATIENT)
Dept: TRANSPLANT | Facility: CLINIC | Age: 71
End: 2021-06-24

## 2021-06-24 NOTE — TELEPHONE ENCOUNTER
Katia, pharmacists calls to to confirm med change from sirolimus to tacro 0.5mg BID. Confirmed that pt is switching and no titration needed for sirolimus?

## 2021-06-24 NOTE — TELEPHONE ENCOUNTER
Informed Katia that the sirolimus will be titrated to every other day along with tacro 0.5mg BID until tacro at goal.

## 2021-07-13 DIAGNOSIS — Z94.4 LIVER REPLACED BY TRANSPLANT (H): ICD-10-CM

## 2021-07-13 RX ORDER — PREDNISONE 10 MG/1
10 TABLET ORAL DAILY
Qty: 30 TABLET | Refills: 0 | Status: SHIPPED | OUTPATIENT
Start: 2021-07-13 | End: 2021-07-13

## 2021-07-13 RX ORDER — PREDNISONE 10 MG/1
10 TABLET ORAL DAILY
Qty: 90 TABLET | Refills: 3 | Status: SHIPPED | OUTPATIENT
Start: 2021-07-13 | End: 2022-07-19

## 2021-07-13 RX ORDER — URSODIOL 250 MG/1
250 TABLET, FILM COATED ORAL 2 TIMES DAILY
Qty: 180 TABLET | Refills: 3 | Status: SHIPPED | OUTPATIENT
Start: 2021-07-13 | End: 2022-07-01

## 2021-07-13 RX ORDER — URSODIOL 250 MG/1
250 TABLET, FILM COATED ORAL 2 TIMES DAILY
Qty: 180 TABLET | Refills: 3 | Status: SHIPPED | OUTPATIENT
Start: 2021-07-13 | End: 2021-07-13

## 2021-07-13 RX ORDER — PREDNISONE 10 MG/1
10 TABLET ORAL DAILY
Qty: 90 TABLET | Refills: 3 | Status: SHIPPED | OUTPATIENT
Start: 2021-07-13 | End: 2021-07-13

## 2021-08-13 ENCOUNTER — TELEPHONE (OUTPATIENT)
Dept: TRANSPLANT | Facility: CLINIC | Age: 71
End: 2021-08-13

## 2021-08-13 DIAGNOSIS — Z94.4 LIVER REPLACED BY TRANSPLANT (H): ICD-10-CM

## 2021-08-13 RX ORDER — SIROLIMUS 1 MG/1
1 TABLET, FILM COATED ORAL DAILY
Qty: 90 TABLET | Refills: 3 | Status: SHIPPED | OUTPATIENT
Start: 2021-08-13 | End: 2022-05-24

## 2021-08-13 NOTE — TELEPHONE ENCOUNTER
The number given is a nonworking number.     Spoke with patient and she is being discharged today. Pt aware to increase rapa 1 mg daily and repeat labs in 2 weeks.   Pt reports that they did decrease her voriconazole dosing and prob why it affected rapa level.

## 2021-08-13 NOTE — TELEPHONE ENCOUNTER
Chaparro, pharmacist calls with a sirolimus level that was drawn the day she took med and showed <2. Pt is inpatient and would like advise on adjusting the med. Please call [phone].

## 2021-09-04 ENCOUNTER — HEALTH MAINTENANCE LETTER (OUTPATIENT)
Age: 71
End: 2021-09-04

## 2021-09-13 NOTE — MR AVS SNAPSHOT
After Visit Summary   6/29/2017    Luz Thompson    MRN: [id number]           Patient Information     Date Of Birth          1950        Visit Information        Provider Department      6/29/2017 2:00 PM Randell Reyna MD [facility]        Today's Diagnoses     Elevated blood pressure reading without diagnosis of hypertension    -  1    CKD (chronic kidney disease) stage 3, GFR 30-59 ml/min        Liver replaced by transplant (H)        Hyperlipidemia with target LDL less than 100          Care Instructions    Change your Lasix to 10 mg by mouth once daily every morning.   Start Pravastatin 5 mg (one-half tablet) by mouth once daily.  Obtain fasting labs in late August or September.   STOP immediately if you develop muscle pain or weakness.     Schedule 24 hour blood pressure monitor.      Return to clinic in 1 year.  Fasting labs will be needed prior to this appointment.     Please do not hesitate to call if you have any cardiology related questions or concerns, or need to schedule an appointment, at [phone].         Cardiology Medication Refill Request Information:  * Please contact your pharmacy regarding ANY request for medication refills.  ** [facility] Prescription Fax = [phone]  * Please allow 3 business days for routine medication refills.    Cardiology Test Result notification information:  *You will be notified with in 7-10 days of your appointment day regarding the results of your test. If you are on MyChart you will be notified as soon as the provider has reviewed the results and signed off on them. Please call RN Care Coordinator with questions regarding results.              Follow-ups after your visit        Follow-up notes from your care team     Discussed this visit Return in about 1 year (around 6/29/2018) for Axel, Cholesterol, HTN, FASTING lab work, Routine Visit.      Your next 10 appointments already scheduled     Jun 29, 2017  4:00 PM CDT   24 Hour  Last Visit Date: 7/14/2021   Next Visit Date: 10/18/2021 Blood Pressure Monitor with [facility]   [facility] ([facility])    [address]  [facility] [address]               Jul 12, 2017  9:30 AM CDT   RETURN RETINA with Meri Frost MD   [facility] Clinic ([facility])    [address]  [address]  [address]  [facility] [place] [address]   [phone]            Jul 12, 2017 10:30 AM CDT   RETURN GENERAL with Dora Tovar MD   [facility] Clinic ([facility])    [address]  [address]  [address]  [facility] [place] [address]   [phone]            Jul 31, 2017  9:20 AM CDT   (Arrive by 9:05 AM)   CT CHEST W/O CONTRAST with CT1   [facility] ([facility])    [address]  [address] [facility] [address] [phone]           Please bring any scans or X-rays taken at other hospitals, if similar tests were done. Also bring a list of your medicines, including vitamins, minerals and over-the-counter drugs. It is safest to leave personal items at home.  Be sure to tell your doctor:   If you have any allergies.   If there s any chance you are pregnant.   If you are breastfeeding.   If you have any special needs.  You do not need to do anything special to prepare.  Please wear loose clothing, such as a sweat suit or jogging clothes. Avoid snaps, zippers and other metal. We may ask you to undress and put on a hospital gown.            Jul 31, 2017 10:30 AM CDT   (Arrive by 10:15 AM)   Return Visit with Eden Clinton MD   [facility] ([facility])    [address] [place] [address]  [address] [facility] [address]   [phone]            Jul 31, 2017  1:00 PM CDT   (Arrive by 12:45 PM)   PAC Pharmacist with  Pac Pharmacist   [facility] ([facility])    [address] [place] [address]  [address] [facility]  [address]   [phone]            Jul 31, 2017  1:30 PM CDT   (Arrive by 1:15 PM)   PAC EVALUATION with  Pac Deidre 3   [facility] ([facility])    [address] [place] [address]  [address] [facility] [address]   [phone]            Jul 31, 2017  2:30 PM CDT   (Arrive by 2:15 PM)   PAC RN ASSESSMENT with Elijah Pac Rn   [facility] ([facility])    [address] [place] [address]  [address] [facility] [address]   [phone]            Jul 31, 2017  3:10 PM CDT   (Arrive by 2:55 PM)   PAC Anesthesia Consult with  Pac Anesthesiologist   [facility] ([facility])    [address] [place] [address]   [phone]            Aug 14, 2017  1:00 PM CDT   (Arrive by 12:45 PM)   ATRIAL FIBULATION VISIT with Graham Gilmore MD   [facility] ([facility])    [address] [place] [address]   [phone]              Future tests that were ordered for you today     Open Future Orders        Priority Expected Expires Ordered    Comprehensive metabolic panel Routine 9/6/2017 6/29/2018 6/29/2017    Lipid panel reflex to direct LDL Routine 9/6/2017 6/29/2018 6/29/2017    24 Hour Blood Pressure Monitor - Adult Routine  6/29/2018 6/29/2017            Who to contact     If you have questions or need follow up information about today's clinic visit or your schedule please contact [facility] directly at [phone].  Normal or non-critical lab and imaging results will be communicated to you by MyChart, letter or phone within 4 business days after the clinic has received the results. If you do not hear from us within 7 days, please contact the clinic through MyChart or phone. If you have a critical or abnormal lab result, we will notify you by phone as soon as possible.  Submit refill  "requests through [employer] or call your pharmacy and they will forward the refill request to us. Please allow 3 business days for your refill to be completed.          Additional Information About Your Visit        Abrilhart Information     [employer] gives you secure access to your electronic health record. If you see a primary care provider, you can also send messages to your care team and make appointments. If you have questions, please call your primary care clinic.  If you do not have a primary care provider, please call [phone] and they will assist you.        Care EveryWhere ID     This is your Care EveryWhere ID. This could be used by other organizations to access your [facility] medical records  [id number]        Your Vitals Were     Pulse Height Pulse Oximetry BMI (Body Mass Index)          76 1.715 m (5' 7.5\") 100% 29.16 kg/m2         Blood Pressure from Last 3 Encounters:   06/29/17 149/79   06/13/17 124/80   05/24/17 178/65    Weight from Last 3 Encounters:   06/29/17 85.7 kg (189 lb)   06/13/17 84.4 kg (186 lb)   05/24/17 85.9 kg (189 lb 4.8 oz)                 Today's Medication Changes          These changes are accurate as of: 6/29/17  3:03 PM.  If you have any questions, ask your nurse or doctor.               Start taking these medicines.        Dose/Directions    pravastatin 10 MG tablet   Commonly known as:  PRAVACHOL   Used for:  Hyperlipidemia with target LDL less than 100   Started by:  Randell Reyna MD        Dose:  10 mg   Take 1 tablet (10 mg) by mouth daily , or as instructed by Dr. Reyna.   Quantity:  90 tablet   Refills:  3         These medicines have changed or have updated prescriptions.        Dose/Directions    furosemide 20 MG tablet   Commonly known as:  LASIX   This may have changed:    - how much to take  - when to take this   Used for:  CKD (chronic kidney disease) stage 3, GFR 30-59 ml/min, Liver replaced by transplant (H)   Changed by:  Randell Reyna MD        Dose:  " 10 mg   Take 0.5 tablets (10 mg) by mouth daily   Quantity:  46 tablet   Refills:  3            Where to get your medicines      These medications were sent to [facility] [id number] [place], [place] [address] AVE AT [facility] & [address] [place], [place] [address] [address]    Hours:  24-hours Phone:  [phone]     furosemide 20 MG tablet    pravastatin 10 MG tablet                Primary Care Provider Office Phone # Fax #    Jennifer Amparo Barraza [phone]       [facility] [address]  [facility] [address]        Equal Access to Services     GENE NOWAK : Hadii brayan guo hadasho Soomaali, waaxda luqadaha, qaybta kaalmada adeegyada, etta hardy. So [facility] [phone].    ATENCIÓN: Si habla español, tiene a jason disposición servicios gratuitos de asistencia lingüística. Llame al [phone].    We comply with applicable federal civil rights laws and [place] laws. We do not discriminate on the basis of race, color, national origin, age, disability sex, sexual orientation or gender identity.            Thank you!     Thank you for choosing [facility]  for your care. Our goal is always to provide you with excellent care. Hearing back from our patients is one way we can continue to improve our services. Please take a few minutes to complete the written survey that you may receive in the mail after your visit with us. Thank you!             Your Updated Medication List - Protect others around you: Learn how to safely use, store and throw away your medicines at www.disposemymeds.org.          This list is accurate as of: 6/29/17  3:03 PM.  Always use your most recent med list.                   Brand Name Dispense Instructions for use Diagnosis    acetaminophen 500 MG Caps      Take 500 mg by mouth as needed Take 500-1,000 mg by mouth every 6 hours if needed.        albuterol 108 (90 BASE) MCG/ACT Inhaler   Generic  drug:  albuterol      Inhale 2 puffs into the lungs every 4 hours as needed for shortness of breath / dyspnea or wheezing Reported on 4/25/2017        apixaban ANTICOAGULANT 5 MG tablet    ELIQUIS    60 tablet    Take 1 tablet (5 mg) by mouth 2 times daily    Paroxysmal atrial fibrillation (H)       ARTIFICIAL TEARS OP      Apply 1 drop to eye as needed        ASPIRIN NOT PRESCRIBED    INTENTIONAL    0 each    Please choose reason not prescribed, below    Cerebral artery occlusion with cerebral infarction (H)       BENEFIBER Powd      2 teaspoons daily        BREO ELLIPTA 100-25 MCG/INH oral inhaler   Generic drug:  fluticasone-vilanterol      Inhale 1 puff into the lungs daily Reported on 4/25/2017        calcitRIOL 0.5 MCG capsule    ROCALTROL    90 capsule    Take 1 capsule (0.5 mcg) by mouth daily        clotrimazole 1 % cream    LOTRIMIN     Apply topically 2 times daily Reported on 4/25/2017        folic acid 1 MG tablet    FOLVITE    90 tablet    Take 1 tablet (1 mg) by mouth daily    High risk medication use       furosemide 20 MG tablet    LASIX    46 tablet    Take 0.5 tablets (10 mg) by mouth daily    CKD (chronic kidney disease) stage 3, GFR 30-59 ml/min, Liver replaced by transplant (H)       hydrALAZINE 25 MG tablet    APRESOLINE    270 tablet    Take 0.5 tablets (12.5 mg) by mouth 3 times daily as needed , if systolic blood pressure is more than 140 mmHg.    HTN (hypertension)       levothyroxine 150 MCG tablet    SYNTHROID/LEVOTHROID    96 tablet    Take one tablet daily 6 days a week and one and a half tablets one day a week.    Postablative hypothyroidism       magnesium 250 MG tablet      Take 2 tablets by mouth daily At night.        meclizine 25 MG tablet    ANTIVERT    30 tablet    Take 1 tablet (25 mg) by mouth as needed    Liver replaced by transplant (H)       metoprolol 25 MG 24 hr tablet    TOPROL-XL    30 tablet    Take 1 tablet (25 mg) by mouth daily    Paroxysmal atrial fibrillation (H)        pravastatin 10 MG tablet    PRAVACHOL    90 tablet    Take 1 tablet (10 mg) by mouth daily , or as instructed by Dr. Reyna.    Hyperlipidemia with target LDL less than 100       predniSONE 5 MG tablet    DELTASONE    90 tablet    Take 1 tablet (5 mg) by mouth daily    Thyroid cancer (H), Liver replaced by transplant (H)       PRESERVISION AREDS 2 Caps      Take 2 capsules by mouth daily    Dry eyes, bilateral       sirolimus Tabs tablet     15 tablet    Take 0.5 mg by mouth every other day    Liver replaced by transplant (H)       STATIN NOT PRESCRIBED (INTENTIONAL)     0 each    Statin not prescribed intentionally due to Intolerance (with supporting documentation of trying a statin at least once within the last 5 years)    Statin intolerance       STATIN NOT PRESCRIBED (INTENTIONAL)      Please choose reason not prescribed, below    Statin intolerance       SUMAtriptan 50 MG tablet    IMITREX    30 tablet    Take 1 tablet (50 mg) by mouth at onset of headache for migraine repeat after 2 hours if needed.    Thyroid cancer (H)       triamcinolone 0.1 % cream    KENALOG    80 g    Apply topically 2 times daily Apply to rash    Contact dermatitis and other eczema, due to unspecified cause       ursodiol 250 MG tablet    ACTIGALL    180 tablet    Take 1 tablet (250 mg) by mouth 2 times daily    Liver replaced by transplant (H)       voriconazole 200 MG tablet    VFEND    60 tablet    Take 1 tablet (200 mg) by mouth 2 times daily    Coccidioidal pneumonitis (H)

## 2021-10-01 ENCOUNTER — TELEPHONE (OUTPATIENT)
Dept: TRANSPLANT | Facility: CLINIC | Age: 71
End: 2021-10-01

## 2021-10-27 DIAGNOSIS — Z13.220 LIPID SCREENING: ICD-10-CM

## 2021-10-27 DIAGNOSIS — Z94.4 LIVER REPLACED BY TRANSPLANT (H): ICD-10-CM

## 2021-10-30 ENCOUNTER — HEALTH MAINTENANCE LETTER (OUTPATIENT)
Age: 71
End: 2021-10-30

## 2021-11-09 ENCOUNTER — TELEPHONE (OUTPATIENT)
Dept: TRANSPLANT | Facility: CLINIC | Age: 71
End: 2021-11-09
Payer: MEDICARE

## 2021-11-09 NOTE — TELEPHONE ENCOUNTER
Karina from Dr. Lawrence office called needing a lab order for the Tacrolimus that was drawn this morning faxed to [phone] and if any other labs are needed.

## 2021-11-11 ENCOUNTER — TELEPHONE (OUTPATIENT)
Dept: TRANSPLANT | Facility: CLINIC | Age: 71
End: 2021-11-11
Payer: MEDICARE

## 2021-11-11 NOTE — TELEPHONE ENCOUNTER
Spoke with [place]. Pt has cellulitis in leg. Pt needs cardiac clearance. Pt surgery canceled. Pt will stop tacrolimus until has new surgery date.     188/111 and massive headache that brought her in the ER. The ER doctor was rude and told her to not come in for hypertension. Pt had a stroke in past with headache and htn. Pt is working with local patient relations department.

## 2021-12-31 ENCOUNTER — TELEPHONE (OUTPATIENT)
Dept: TRANSPLANT | Facility: CLINIC | Age: 71
End: 2021-12-31
Payer: MEDICARE

## 2021-12-31 NOTE — TELEPHONE ENCOUNTER
Pt encouraged to go see local PCP doctor. COVID and influenza negative.  Pt did see MD and started on prednisone  Taper. Pt remains short of breath.    Pt encouraged to not fly with illness.

## 2021-12-31 NOTE — TELEPHONE ENCOUNTER
Patient Call: General  Reason for call:     Patient had 2 negative COVID nose swab test recently and is worried about potential false negatives and what she should do since she is traveling on 01/06 via airplane. Patient states she still has symptoms and wants to be sure not cold or COVID due to shortness of breath being main concern      Call back needed? Yes    Return Call Needed  Same as documented in contacts section  When to return call?: Same day: Route High Priority

## 2022-02-07 ENCOUNTER — TELEPHONE (OUTPATIENT)
Dept: TRANSPLANT | Facility: CLINIC | Age: 72
End: 2022-02-07
Payer: MEDICARE

## 2022-02-07 DIAGNOSIS — Z94.4 LIVER REPLACED BY TRANSPLANT (H): ICD-10-CM

## 2022-02-07 RX ORDER — TACROLIMUS 0.5 MG/1
CAPSULE ORAL
Qty: 90 CAPSULE | Refills: 1 | Status: SHIPPED | OUTPATIENT
Start: 2022-02-07 | End: 2022-04-06

## 2022-02-07 NOTE — TELEPHONE ENCOUNTER
Pt called to report that her tacro level 2.1. pt taking 0.5 mg BID. Pt will increase to 0.5 mg AM and 1 mg PM.     Pt repeated plan and will request clinic fax.

## 2022-02-19 ENCOUNTER — HEALTH MAINTENANCE LETTER (OUTPATIENT)
Age: 72
End: 2022-02-19

## 2022-04-06 DIAGNOSIS — Z94.4 LIVER REPLACED BY TRANSPLANT (H): ICD-10-CM

## 2022-04-06 RX ORDER — TACROLIMUS 0.5 MG/1
CAPSULE ORAL
Qty: 90 CAPSULE | Refills: 1 | Status: SHIPPED | OUTPATIENT
Start: 2022-04-06 | End: 2022-10-12

## 2022-04-11 ENCOUNTER — DOCUMENTATION ONLY (OUTPATIENT)
Dept: TRANSPLANT | Facility: CLINIC | Age: 72
End: 2022-04-11
Payer: MEDICARE

## 2022-04-16 ENCOUNTER — HEALTH MAINTENANCE LETTER (OUTPATIENT)
Age: 72
End: 2022-04-16

## 2022-05-03 DIAGNOSIS — Z94.4 LIVER REPLACED BY TRANSPLANT (H): Primary | ICD-10-CM

## 2022-05-24 ENCOUNTER — TELEPHONE (OUTPATIENT)
Dept: TRANSPLANT | Facility: CLINIC | Age: 72
End: 2022-05-24
Payer: MEDICARE

## 2022-05-24 DIAGNOSIS — Z94.4 LIVER REPLACED BY TRANSPLANT (H): ICD-10-CM

## 2022-05-24 RX ORDER — SIROLIMUS 1 MG/1
1 TABLET, FILM COATED ORAL DAILY
Qty: 90 TABLET | Refills: 3 | Status: SHIPPED | OUTPATIENT
Start: 2022-05-24 | End: 2022-10-20

## 2022-06-08 ENCOUNTER — OFFICE VISIT (OUTPATIENT)
Dept: ORTHOPEDICS | Facility: CLINIC | Age: 72
End: 2022-06-08
Payer: MEDICARE

## 2022-06-08 DIAGNOSIS — Z98.890 H/O THUMB SURGERY: Primary | ICD-10-CM

## 2022-06-08 DIAGNOSIS — M19.049 CMC ARTHRITIS: ICD-10-CM

## 2022-06-08 PROCEDURE — 99213 OFFICE O/P EST LOW 20 MIN: CPT | Performed by: PREVENTIVE MEDICINE

## 2022-06-08 NOTE — LETTER
6/8/2022         RE: Luz Thompson  [id number]  [place] [address]        Dear Colleague,    Thank you for referring your patient, Luz Thompson, to the [facility] [place]. Please see a copy of my visit note below.    HISTORY OF PRESENT ILLNESS  Ms. Thompson is a pleasant 72 year old year old female who presents to clinic today with   [place] explains that she is having problems with her right thumb, is visiting till the 28th and would like to continue hand therapy here in [place] before heading back to [place]  She had surgery on 4/22 and has been doing well  She wants to continue hand therapy for rehab after surgery here  Location: right thumb   Quality:  Achy, and burning into palm of hand.    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     Prophylactic antibiotic     High risk medication use     Statin intolerance     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     Sensorineural hearing loss (SNHL) of both ears     Abnormal liver function tests     Liver replaced by transplant (H)     Hyperlipidemia LDL goal <70     Hypertension goal BP (blood pressure) < 140/80     Postoperative hypothyroidism     Type 2 diabetes mellitus with stage 3 chronic kidney disease, without long-term current use of insulin (H)     Age-related osteoporosis without current pathological fracture     Tympanic membrane perforation, left     Other infective chronic otitis externa of left ear     CKD  (chronic kidney disease) stage 3, GFR 30-59 ml/min (H)     Escherichia coli sepsis (H)     Physical deconditioning     Immunosuppression (H)     Papillary thyroid carcinoma (H)       Current Outpatient Medications   Medication Sig Dispense Refill     acetaminophen (TYLENOL) 325 MG tablet Take 2 tablets (650 mg) by mouth every 6 hours as needed for mild pain or fever       apixaban ANTICOAGULANT (ELIQUIS ANTICOAGULANT) 2.5 MG tablet Take 1 tablet (2.5 mg) by mouth 2 times daily 180 tablet 3     blood glucose (ACCU-CHEK GUIDE) test strip Use to test blood sugar 2 times daily or as directed. 100 strip 11     blood glucose monitoring (ACCU-CHEK FASTCLIX) lancets Use to test blood sugar 2 times daily or as directed. 102 each 11     calcitRIOL (ROCALTROL) 0.5 MCG capsule Take 1 capsule (0.5 mcg) by mouth daily 90 capsule 3     clotrimazole (LOTRIMIN) 1 % external cream Apply topically 2 times daily as needed Reported on 4/25/2017       estradiol (ESTRACE) 0.1 MG/GM vaginal cream Place 2 g vaginally twice a week       evolocumab (REPATHA SURECLICK) 140 MG/ML prefilled autoinjector Inject 1 mL (140 mg) Subcutaneous every 14 days . Please have fasting labs drawn for further refills. 6 mL 0     ezetimibe (ZETIA) 10 MG tablet TAKE 1 TABLET EVERY DAY 90 tablet 3     ferrous gluconate (FERGON) 324 (38 Fe) MG tablet Take 1 tablet (324 mg) by mouth 3 times daily (with meals) 90 tablet 0     folic acid (FOLVITE) 1 MG tablet Take 1 tablet (1 mg) by mouth daily 100 tablet 0     furosemide (LASIX) 20 MG tablet Take 1 tablet (20 mg) by mouth daily 90 tablet 3     glucose (BD GLUCOSE) 5 g chewable tablet Take 2 tablets (10 g) by mouth as needed (low blood sugar) 40 tablet 1     glucose 40 % (400 mg/mL) gel Take 15-30 g by mouth every 15 minutes as needed for low blood sugar       Guar Gum (FIBER MODULAR, NUTRISOURCE FIBER,) packet 1 packet by Oral or Feeding Tube route daily       hypromellose (ARTIFICIAL TEARS) 0.4 % SOLN ophthalmic  solution Apply 1 drop to eye every hour as needed for dry eyes       levothyroxine (SYNTHROID/LEVOTHROID) 150 MCG tablet Take 1 tablet (150 mcg) by mouth every other day For additional refills, please schedule a follow-up appointment at [phone] 45 tablet 0     levothyroxine (SYNTHROID/LEVOTHROID) 175 MCG tablet Take 1 tablet (175 mcg) by mouth every other day 45 tablet 0     linagliptin (TRADJENTA) 5 MG TABS tablet Take 1 tablet (5 mg) by mouth daily 90 tablet 1     losartan (COZAAR) 25 MG tablet Take 1 tablet (25 mg) by mouth daily 90 tablet 3     meclizine (ANTIVERT) 25 MG tablet Take 1 tablet (25 mg) by mouth as needed for dizziness or other (migraines) 30 tablet 11     metoprolol succinate ER (TOPROL-XL) 50 MG 24 hr tablet Take 0.5 tablets (25 mg) by mouth daily 90 tablet 3     metroNIDAZOLE (METROGEL) 0.75 % external gel Apply topically 2 times daily Put on face 45 g 3     Multiple Vitamins-Minerals (PRESERVISION AREDS 2) CAPS Take 1 capsule by mouth 2 times daily       omega-3 acid ethyl esters (LOVAZA) 1 g capsule Take 1 capsule by mouth 2 times daily 180 capsule 1     order for DME Equipment being ordered: right brace with thumb 1 Device 0     predniSONE (DELTASONE) 10 MG tablet Take 1 tablet (10 mg) by mouth daily 90 tablet 3     sennosides (SENOKOT) 8.6 MG tablet Take 2 tablets by mouth 2 times daily as needed for constipation       sertraline (ZOLOFT) 100 MG tablet Take 1 tab (100mg) PO daily 30 tablet 0     sirolimus (GENERIC EQUIVALENT) 1 MG tablet Take 1 tablet (1 mg) by mouth daily 90 tablet 3     STATIN NOT PRESCRIBED (INTENTIONAL) Please choose reason not prescribed, below (Patient not taking: Reported on 8/12/2020)       SUMAtriptan (IMITREX) 50 MG tablet Take 1 tablet (50 mg) by mouth at onset of headache for migraine repeat after 2 hours if needed. 30 tablet 3     tacrolimus (GENERIC EQUIVALENT) 0.5 MG capsule Take 1 capsule (0.5 mg) by mouth every morning AND 2 capsules (1 mg) every  evening. 90 capsule 1     ursodiol (ACTIGALL) 250 MG tablet Take 1 tablet (250 mg) by mouth 2 times daily 180 tablet 3       Allergies   Allergen Reactions     Fluconazole Hives and Itching     Azithromycin Itching     Benadryl [Diphenhydramine Hcl]      Insomnia      Cellcept Diarrhea     Ciprofloxacin Other (See Comments)     Insomnia, mood lability, Irregular heart beat, anxiety     Codeine      Psych disturbance     Diphenhydramine Other (See Comments)     Doxycycline      Lansoprazole Diarrhea     Levaquin [Levofloxacin] Other (See Comments)     Headache, hyperactivity     Lisinopril Cough     Methotrexate      Sores     Morphine Sulfate Itching     Mycophenolate Diarrhea     Simvastatin Muscle Pain (Myalgia)     severe     Cephalexin Rash     Fever and skin burning     Penicillin G Itching and Rash     Tolectin [Nsaids] Rash     Tramadol Rash       Family History   Problem Relation Age of Onset     Hypertension Mother      Endometrial Cancer Mother      Hyperlipidemia Mother      Prostate Cancer Father      Macular Degeneration Father      Cancer - colorectal Maternal Grandmother         in her 80's, has surgery and removal of part of kidney,  at age 98     Heart Disease Maternal Grandfather          at 98     Glaucoma Maternal Grandfather      Cerebrovascular Disease Paternal Grandmother         in her 80's     Hypertension Paternal Grandmother      Heart Disease Paternal Grandfather         MI     Alzheimer Disease Paternal Grandfather      Allergies Son      Neurologic Disorder Daughter         Migraines     Breast Cancer Other      Anesthesia Reaction No family hx of      Crohn's Disease No family hx of      Ulcerative Colitis No family hx of      Social History     Socioeconomic History     Marital status:      Spouse name: Robbin Thompson     Number of children: 4     Years of education: 20   Occupational History     Occupation: Guardian Conservator      Employer: [employer]     Comment: social work     Employer: SEL[employer]   Tobacco Use     Smoking status: Former Smoker     Packs/day: 1.00     Years: 18.00     Pack years: 18.00     Types: Cigarettes     Quit date: 1985     Years since quittin.1     Smokeless tobacco: Never Used   Substance and Sexual Activity     Alcohol use: Yes     Alcohol/week: 0.0 standard drinks     Comment: rare - \"I toast at weddings\"     Drug use: No     Sexual activity: Yes     Partners: Male     Birth control/protection: Post-menopausal   Other Topics Concern     Exercise Yes     Comment: cardio and strengthing   Social History Narrative    She is retired. She lives in the [place] [place] of the [place]. 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       Additional medical/Social/Surgical histories reviewed in [facility] and updated as appropriate.     REVIEW OF SYSTEMS (2022)  10 point ROS of systems including Constitutional, Eyes, Respiratory, Cardiovascular, Gastroenterology, Genitourinary, Integumentary, Musculoskeletal, Psychiatric, Allergic/Immunologic were all negative except for pertinent positives noted in my HPI.     PHYSICAL EXAM  Vital Signs: LMP 1988 (Approximate)  Patient declined being weighed. There is no height or weight on file to calculate BMI.    General  - normal appearance, in no obvious distress  HEENT  - conjunctivae not injected, moist mucous membranes, normocephalic/atraumatic head, ears normal appearance, no lesions, mouth normal appearance, no scars, normal dentition and teeth present    CV  - normal radial pulse  Pulm  - normal respiratory pattern, non-labored  Musculoskeletal -  Right hand  - inspection: no atrophy, normal joint alignment, no swelling  - palpation: no bony or soft tissue tenderness, except some at base of thumb at cmc joint, no tenderness at the anatomical snuffbox  - ROM of fingers:  MCP 90 deg flexion   0 deg extension       "            PIP           120 deg flexion  0 deg extension                     DIP 80 deg flexion   0 deg extension  - strength: 5/5  strength, 5/5 wrist abduction, 5/5 flexion, extension, pronation, supination, adduction  - special tests:  (-) varus  (-) valgus  Neuro  - no numbness, no motor deficit, grossly normal coordination, normal muscle tone  Skin  - no ecchymosis, erythema, warmth, or induration, no obvious rash  Psych  - interactive, appropriate, normal mood and affect  ASSESSMENT & PLAN  71 yo female with right hand cmc joint surgery and discomfort  I independently reviewed the following imaging studies:  xrays hand: shows cmc arthritis  Discussed and ordered hand therapy  voltaren gel rx  F/u PRN  Appropriate PPE was utilized for prevention of spread of Covid-19.  Samson Griffith MD, CAM          Again, thank you for allowing me to participate in the care of your patient.        Sincerely,        Samson Griffith MD

## 2022-06-08 NOTE — PROGRESS NOTES
HISTORY OF PRESENT ILLNESS  Ms. Thompson is a pleasant 72 year old year old female who presents to clinic today with   [place] explains that she is having problems with her right thumb, is visiting till the 28th and would like to continue hand therapy here in [place] before heading back to [place]  She had surgery on 4/22 and has been doing well  She wants to continue hand therapy for rehab after surgery here  Location: right thumb   Quality:  Achy, and burning into palm of hand.    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     Prophylactic antibiotic     High risk medication use     Statin intolerance     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     Sensorineural hearing loss (SNHL) of both ears     Abnormal liver function tests     Liver replaced by transplant (H)     Hyperlipidemia LDL goal <70     Hypertension goal BP (blood pressure) < 140/80     Postoperative hypothyroidism     Type 2 diabetes mellitus with stage 3 chronic kidney disease, without long-term current use of insulin (H)     Age-related osteoporosis without current pathological fracture     Tympanic membrane perforation, left     Other infective chronic otitis externa of left ear     CKD (chronic kidney disease) stage 3, GFR 30-59 ml/min (H)     Escherichia coli sepsis (H)     Physical deconditioning     Immunosuppression (H)     Papillary thyroid carcinoma (H)       Current Outpatient Medications   Medication Sig Dispense Refill     acetaminophen (TYLENOL) 325 MG  tablet Take 2 tablets (650 mg) by mouth every 6 hours as needed for mild pain or fever       apixaban ANTICOAGULANT (ELIQUIS ANTICOAGULANT) 2.5 MG tablet Take 1 tablet (2.5 mg) by mouth 2 times daily 180 tablet 3     blood glucose (ACCU-CHEK GUIDE) test strip Use to test blood sugar 2 times daily or as directed. 100 strip 11     blood glucose monitoring (ACCU-CHEK FASTCLIX) lancets Use to test blood sugar 2 times daily or as directed. 102 each 11     calcitRIOL (ROCALTROL) 0.5 MCG capsule Take 1 capsule (0.5 mcg) by mouth daily 90 capsule 3     clotrimazole (LOTRIMIN) 1 % external cream Apply topically 2 times daily as needed Reported on 4/25/2017       estradiol (ESTRACE) 0.1 MG/GM vaginal cream Place 2 g vaginally twice a week       evolocumab (REPATHA SURECLICK) 140 MG/ML prefilled autoinjector Inject 1 mL (140 mg) Subcutaneous every 14 days . Please have fasting labs drawn for further refills. 6 mL 0     ezetimibe (ZETIA) 10 MG tablet TAKE 1 TABLET EVERY DAY 90 tablet 3     ferrous gluconate (FERGON) 324 (38 Fe) MG tablet Take 1 tablet (324 mg) by mouth 3 times daily (with meals) 90 tablet 0     folic acid (FOLVITE) 1 MG tablet Take 1 tablet (1 mg) by mouth daily 100 tablet 0     furosemide (LASIX) 20 MG tablet Take 1 tablet (20 mg) by mouth daily 90 tablet 3     glucose (BD GLUCOSE) 5 g chewable tablet Take 2 tablets (10 g) by mouth as needed (low blood sugar) 40 tablet 1     glucose 40 % (400 mg/mL) gel Take 15-30 g by mouth every 15 minutes as needed for low blood sugar       Guar Gum (FIBER MODULAR, NUTRISOURCE FIBER,) packet 1 packet by Oral or Feeding Tube route daily       hypromellose (ARTIFICIAL TEARS) 0.4 % SOLN ophthalmic solution Apply 1 drop to eye every hour as needed for dry eyes       levothyroxine (SYNTHROID/LEVOTHROID) 150 MCG tablet Take 1 tablet (150 mcg) by mouth every other day For additional refills, please schedule a follow-up appointment at [phone] 45 tablet 0     levothyroxine  (SYNTHROID/LEVOTHROID) 175 MCG tablet Take 1 tablet (175 mcg) by mouth every other day 45 tablet 0     linagliptin (TRADJENTA) 5 MG TABS tablet Take 1 tablet (5 mg) by mouth daily 90 tablet 1     losartan (COZAAR) 25 MG tablet Take 1 tablet (25 mg) by mouth daily 90 tablet 3     meclizine (ANTIVERT) 25 MG tablet Take 1 tablet (25 mg) by mouth as needed for dizziness or other (migraines) 30 tablet 11     metoprolol succinate ER (TOPROL-XL) 50 MG 24 hr tablet Take 0.5 tablets (25 mg) by mouth daily 90 tablet 3     metroNIDAZOLE (METROGEL) 0.75 % external gel Apply topically 2 times daily Put on face 45 g 3     Multiple Vitamins-Minerals (PRESERVISION AREDS 2) CAPS Take 1 capsule by mouth 2 times daily       omega-3 acid ethyl esters (LOVAZA) 1 g capsule Take 1 capsule by mouth 2 times daily 180 capsule 1     order for DME Equipment being ordered: right brace with thumb 1 Device 0     predniSONE (DELTASONE) 10 MG tablet Take 1 tablet (10 mg) by mouth daily 90 tablet 3     sennosides (SENOKOT) 8.6 MG tablet Take 2 tablets by mouth 2 times daily as needed for constipation       sertraline (ZOLOFT) 100 MG tablet Take 1 tab (100mg) PO daily 30 tablet 0     sirolimus (GENERIC EQUIVALENT) 1 MG tablet Take 1 tablet (1 mg) by mouth daily 90 tablet 3     STATIN NOT PRESCRIBED (INTENTIONAL) Please choose reason not prescribed, below (Patient not taking: Reported on 8/12/2020)       SUMAtriptan (IMITREX) 50 MG tablet Take 1 tablet (50 mg) by mouth at onset of headache for migraine repeat after 2 hours if needed. 30 tablet 3     tacrolimus (GENERIC EQUIVALENT) 0.5 MG capsule Take 1 capsule (0.5 mg) by mouth every morning AND 2 capsules (1 mg) every evening. 90 capsule 1     ursodiol (ACTIGALL) 250 MG tablet Take 1 tablet (250 mg) by mouth 2 times daily 180 tablet 3       Allergies   Allergen Reactions     Fluconazole Hives and Itching     Azithromycin Itching     Benadryl [Diphenhydramine Hcl]      Insomnia      Cellcept Diarrhea      Ciprofloxacin Other (See Comments)     Insomnia, mood lability, Irregular heart beat, anxiety     Codeine      Psych disturbance     Diphenhydramine Other (See Comments)     Doxycycline      Lansoprazole Diarrhea     Levaquin [Levofloxacin] Other (See Comments)     Headache, hyperactivity     Lisinopril Cough     Methotrexate      Sores     Morphine Sulfate Itching     Mycophenolate Diarrhea     Simvastatin Muscle Pain (Myalgia)     severe     Cephalexin Rash     Fever and skin burning     Penicillin G Itching and Rash     Tolectin [Nsaids] Rash     Tramadol Rash       Family History   Problem Relation Age of Onset     Hypertension Mother      Endometrial Cancer Mother      Hyperlipidemia Mother      Prostate Cancer Father      Macular Degeneration Father      Cancer - colorectal Maternal Grandmother         in her 80's, has surgery and removal of part of kidney,  at age 98     Heart Disease Maternal Grandfather          at 98     Glaucoma Maternal Grandfather      Cerebrovascular Disease Paternal Grandmother         in her 80's     Hypertension Paternal Grandmother      Heart Disease Paternal Grandfather         MI     Alzheimer Disease Paternal Grandfather      Allergies Son      Neurologic Disorder Daughter         Migraines     Breast Cancer Other      Anesthesia Reaction No family hx of      Crohn's Disease No family hx of      Ulcerative Colitis No family hx of      Social History     Socioeconomic History     Marital status:      Spouse name: Robbin Thompson     Number of children: 4     Years of education: 20   Occupational History     Occupation: Guardian Conservator      Employer: [employer]     Comment: social work     Employer: SELF   Tobacco Use     Smoking status: Former Smoker     Packs/day: 1.00     Years: 18.00     Pack years: 18.00     Types: Cigarettes     Quit date: 1985     Years since quittin.1     Smokeless tobacco: Never Used   Substance and  "Sexual Activity     Alcohol use: Yes     Alcohol/week: 0.0 standard drinks     Comment: rare - \"I toast at weddings\"     Drug use: No     Sexual activity: Yes     Partners: Male     Birth control/protection: Post-menopausal   Other Topics Concern     Exercise Yes     Comment: cardio and strengthing   Social History Narrative    She is retired. [place]. 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       Additional medical/Social/Surgical histories reviewed in [facility] and updated as appropriate.     REVIEW OF SYSTEMS (6/8/2022)  10 point ROS of systems including Constitutional, Eyes, Respiratory, Cardiovascular, Gastroenterology, Genitourinary, Integumentary, Musculoskeletal, Psychiatric, Allergic/Immunologic were all negative except for pertinent positives noted in my HPI.     PHYSICAL EXAM  Vital Signs: LMP 06/01/1988 (Approximate)  Patient declined being weighed. There is no height or weight on file to calculate BMI.    General  - normal appearance, in no obvious distress  HEENT  - conjunctivae not injected, moist mucous membranes, normocephalic/atraumatic head, ears normal appearance, no lesions, mouth normal appearance, no scars, normal dentition and teeth present    CV  - normal radial pulse  Pulm  - normal respiratory pattern, non-labored  Musculoskeletal -  Right hand  - inspection: no atrophy, normal joint alignment, no swelling  - palpation: no bony or soft tissue tenderness, except some at base of thumb at cmc joint, no tenderness at the anatomical snuffbox  - ROM of fingers:  MCP 90 deg flexion   0 deg extension                  PIP           120 deg flexion  0 deg extension                     DIP 80 deg flexion   0 deg extension  - strength: 5/5  strength, 5/5 wrist abduction, 5/5 flexion, extension, pronation, supination, adduction  - special tests:  (-) varus  (-) valgus  Neuro  - no " numbness, no motor deficit, grossly normal coordination, normal muscle tone  Skin  - no ecchymosis, erythema, warmth, or induration, no obvious rash  Psych  - interactive, appropriate, normal mood and affect  ASSESSMENT & PLAN  71 yo female with right hand cmc joint surgery and discomfort  I independently reviewed the following imaging studies:  xrays hand: shows cmc arthritis  Discussed and ordered hand therapy  voltaren gel rx  F/u PRN  Appropriate PPE was utilized for prevention of spread of Covid-19.  Samson Griffith MD, CAQSM

## 2022-06-09 ENCOUNTER — THERAPY VISIT (OUTPATIENT)
Dept: OCCUPATIONAL THERAPY | Facility: CLINIC | Age: 72
End: 2022-06-09
Payer: MEDICARE

## 2022-06-09 DIAGNOSIS — Z48.89 AFTERCARE FOLLOWING SURGERY FOR INJURY AND TRAUMA: ICD-10-CM

## 2022-06-09 DIAGNOSIS — M79.644 THUMB PAIN, RIGHT: Primary | ICD-10-CM

## 2022-06-09 PROCEDURE — 97110 THERAPEUTIC EXERCISES: CPT | Mod: GO | Performed by: OCCUPATIONAL THERAPIST

## 2022-06-09 PROCEDURE — 97165 OT EVAL LOW COMPLEX 30 MIN: CPT | Mod: GO | Performed by: OCCUPATIONAL THERAPIST

## 2022-06-09 NOTE — PROGRESS NOTES
Hand Therapy Initial Evaluation    Current Date:  6/9/2022  Left handed  Going back to [place] on the 28th of June, sees surgeon on the 29th    Diagnosis: R thumb pain  DOS: 4/12/22 (per pt)  Procedure:  CMC joint arthroplasty  Post:  8w 2d    Precautions: post op    Subjective:  Luz Thompson is a 72 year old female.    Patient reports symptoms of the right thumb which occurred due to OA. Since onset symptoms are Gradually getting better.  General health as reported by patient is fair.  Pertinent medical history includes:Anemia, Cancer, Concussions/Dizziness, Diabetes, High Blood Pressure, History of Fractures, Kidney Disease, Numbness/Tingling, Osteoarthritis, Osteoporosis, Overweight, Thyroid Problems  Medical allergies:See electronic medical record.  Surgical history: orthopedic: R thumb, as noted above.  Medication history: High Blood Pressure, Steroids, Thyroid.    Current occupation is retired  Job Tasks: Driving, Prolonged Sitting    Occupational Profile Information:  Left hand dominant  Prior functional level:  mild limitations  Patient reports symptoms of pain, stiffness/loss of motion and weakness/loss of strength  Special tests:  xray.    Previous treatment: hand therapy  Barriers include:none  Mobility: Ambulates with aid of cane  Transportation: drives  Currently staying in [place] with a friend. Her daughter lives in [place]. Pt lives in [place]. She recently hand an OT/ coming to her home to help her get her stronger overall.    Functional Outcome Measure:   Upper Extremity Functional Index Score:  SCORE:   Column Totals: /80: 43   (A lower score indicates greater disability.)    Objective:  Pain Level (Scale 0-10)   6/9/2022   At Rest 0   At worst mild     Pain Description  Date 6/9/2022   Location thumb   Pain Quality Aching and Burning   Frequency intermittent     Pain is worst  daytime   Exacerbated by  overuse   Relieved by rest   Progression Improving overall     ROM  Thumb 6/9/2022  "6/9/2022   AROM  (PROM) R L   MP /25 /54   IP /25 /53   RABD 35 32   PABD 41 40   Retropulsion (cm) half cm 1 cm   Kapandji Opposition Scale (0-10/10) 9 9     ROM  Pain Report: - none  + mild    ++ moderate    +++ severe   Wrist 6/9/2022 6/9/2022   AROM (PROM) R L   Extension WFL WFL   Flexion 48 \"   RD  \"   UD  \"   Supination WFL \"   Pronation \" \"         Thumb Observation/Appearance   - none  + mild    ++ moderate    +++ severe     6/9/2022   observations Well healed incision. Mild edema to fingers, dorsal hand      Strength   (Measured in pounds)  deferred    Assessment:  Patient presents with symptoms consistent with diagnosis of thumb and wrist pain/dysfunction following thumb CMC joint arthroplasty.     Patient's limitations or Problem List includes: pain, weakness, edema, decreased ROM of the right wrist and thumb which interferes with the patient's ability to perform ADLs and instrumental activities of daily living as compared to previous level of function.    Rehab Potential:  Excellent, expect return to normal activities.    Patient will benefit from skilled Occupational Therapy to increase ROM and decrease pain, to return to previous activity level and resume normal daily tasks and to reach their rehab potential.    Barriers to Learning:  no barriers    Communication Issues:  Patient appears to be able to clearly communicate and understand verbal and written communication and follow directions correctly.    Chart Review: Brief history including review of medical and/or therapy records relating to the presenting problem and Simple history review with patient    Identified Performance Deficits: self cares and home establishment and management.  Assessment of Occupational Performance:  3-5 Performance Deficits    Clinical Decision Making (Complexity): Low complexity    Treatment Explanation:  The following has been discussed with the patient:  RX ordered/plan of care  Anticipated outcomes  Possible risks and " side effects    Plan:  Frequency:  2 X week, once daily  Duration:  for 3 weeks tapering to 1 X a week over 1 week  7 poc    Treatment Plan:   Modalities:  US and Paraffin  Therapeutic Exercise:  AROM, Place and Hold, Isotonics, Isometrics and Stabilization  Neuromuscular re-education:  Coordination/Dexterity and Proprioceptive Training  Manual Techniques:  Friction massage and Myofascial release  Orthotic Fabrication:  Static and Hand based  Self Care:  Self Care Tasks    Discharge Plan:  Achieve all LTG.  Independent in home treatment program.  Reach maximal therapeutic benefit.    Thank you for the opportunity to work with this patient.   If you have questions or concerns, please contact me with an in basket message or via the information below.     Hina Thompson MS, OTR/L, CHT  Certified Hand Therapist    [facility] - Hand Therapy  [facility]  [address] [place] [address], Room [address] [place] [address]    Email: [email or web address]   Phone / Voicemail: [phone]   Hand Therapy  phone: [phone] (staffed M-F)  Fax: [phone]

## 2022-06-10 DIAGNOSIS — R79.89 ELEVATED SERUM CREATININE: Primary | ICD-10-CM

## 2022-06-10 PROBLEM — Z48.89 AFTERCARE FOLLOWING SURGERY FOR INJURY AND TRAUMA: Status: ACTIVE | Noted: 2022-06-10

## 2022-06-10 PROBLEM — M79.644 THUMB PAIN, RIGHT: Status: ACTIVE | Noted: 2022-06-10

## 2022-06-10 NOTE — PROGRESS NOTES
[facility]    OUTPATIENT Occupational Therapy ORTHOPEDIC EVALUATION  PLAN OF TREATMENT FOR OUTPATIENT REHABILITATION  (COMPLETE FOR INITIAL CLAIMS ONLY)  Patient's Last Name, First Name, M.I.  YOB: 1950  Luz Thompson    Provider s Name:  [facility]   Medical Record No.  [id number]   Start of Care Date:  06/09/22   Onset Date:  04/12/22 (orders locally 6/8/22)    Type:     OT Medical Diagnosis:    Encounter Diagnoses   Name Primary?    Thumb pain, right     Aftercare following surgery for injury and trauma         Treatment Diagnosis:  R thumb CMC joint arthroplasty        Goals:     06/09/22 0500   Goal #1   Goal #1 household chores   Previous Performance Level Independent  (no dificulty with R hand)   Current Functional Task    Current Performance Level unable   STG [employer] Per[employer]ance Other - on additional line   Other Household Chores moderate difficulty to handle items for home chores   STG Target Perform Level mild difficulty to handle items for home chores   Due Date 08/09/22   LTG Target Task/Performance Other - on additional line   Other Household Chores no difficulty with R hand to handle items for light home chores   Due Date 07/21/22       Therapy Frequency:  2 X week, once daily  Predicted Duration of Therapy Intervention:  for 3 weeks tapering to 1 X a week over 1 week    Hina Thompson OT                 I CERTIFY THE NEED FOR THESE SERVICES FURNISHED UNDER        THIS PLAN OF TREATMENT AND WHILE UNDER MY CARE     (Physician attestation of this document indicates review and certification of the therapy plan).                     Certification Date From:  06/09/22   Certification Date To:  07/09/22    Referring Provider:  Nereida Seth MD    Initial Assessment        See Epic Evaluation SOC Date: 06/09/22

## 2022-06-16 ENCOUNTER — THERAPY VISIT (OUTPATIENT)
Dept: OCCUPATIONAL THERAPY | Facility: CLINIC | Age: 72
End: 2022-06-16
Payer: MEDICARE

## 2022-06-16 DIAGNOSIS — Z48.89 AFTERCARE FOLLOWING SURGERY FOR INJURY AND TRAUMA: ICD-10-CM

## 2022-06-16 DIAGNOSIS — M79.644 THUMB PAIN, RIGHT: Primary | ICD-10-CM

## 2022-06-16 PROCEDURE — 97110 THERAPEUTIC EXERCISES: CPT | Mod: GO | Performed by: OCCUPATIONAL THERAPIST

## 2022-06-16 PROCEDURE — 97535 SELF CARE MNGMENT TRAINING: CPT | Mod: GO | Performed by: OCCUPATIONAL THERAPIST

## 2022-06-16 NOTE — PROGRESS NOTES
Please refer to the daily flowsheet for treatment today, total treatment time and time spent performing 1:1 timed codes.          .

## 2022-06-21 ENCOUNTER — THERAPY VISIT (OUTPATIENT)
Dept: OCCUPATIONAL THERAPY | Facility: CLINIC | Age: 72
End: 2022-06-21
Payer: MEDICARE

## 2022-06-21 DIAGNOSIS — Z48.89 AFTERCARE FOLLOWING SURGERY FOR INJURY AND TRAUMA: ICD-10-CM

## 2022-06-21 DIAGNOSIS — M79.644 THUMB PAIN, RIGHT: Primary | ICD-10-CM

## 2022-06-21 PROCEDURE — 97112 NEUROMUSCULAR REEDUCATION: CPT | Mod: GO | Performed by: OCCUPATIONAL THERAPIST

## 2022-06-21 PROCEDURE — 97110 THERAPEUTIC EXERCISES: CPT | Mod: GO | Performed by: OCCUPATIONAL THERAPIST

## 2022-06-23 ENCOUNTER — THERAPY VISIT (OUTPATIENT)
Dept: OCCUPATIONAL THERAPY | Facility: CLINIC | Age: 72
End: 2022-06-23
Payer: MEDICARE

## 2022-06-23 DIAGNOSIS — M79.644 THUMB PAIN, RIGHT: Primary | ICD-10-CM

## 2022-06-23 DIAGNOSIS — Z48.89 AFTERCARE FOLLOWING SURGERY FOR INJURY AND TRAUMA: ICD-10-CM

## 2022-06-23 PROCEDURE — 97535 SELF CARE MNGMENT TRAINING: CPT | Mod: GO | Performed by: OCCUPATIONAL THERAPIST

## 2022-06-23 PROCEDURE — 97110 THERAPEUTIC EXERCISES: CPT | Mod: GO | Performed by: OCCUPATIONAL THERAPIST

## 2022-06-23 NOTE — PROGRESS NOTES
" Discharge Note - Hand Therapy      Current Date:  6/23/2022    Initial Evaluation Date:  6/9/22  Reporting period is from 6/9/22 to 6/23/2022  Number of Visits:  4    Diagnosis: R thumb pain  DOS: 4/12/22 (per pt)  Procedure:  CMC joint arthroplasty    Post: 10 w 2d     Precautions: post op    Subjective:  Overall it has progressed leaps and bounds in the last month.    Occupational Profile Information:  Left hand dominant  Mobility: Ambulates with aid of cane      Objective:  Pain Level (Scale 0-10)   6/9/2022 6/23/2022     At Rest 0 It aches, I don't call that a pain. I put the brace on when it aches. Has worn the brace just a little bit in past couple days. Off at night   At worst mild A couple times it has made me say ouch, when I do something wrong.      Pain Description  Date 6/9/2022   Location thumb   Pain Quality Aching and Burning   Frequency intermittent     Pain is worst  daytime   Exacerbated by  overuse   Relieved by rest   Progression Improving overall     ROM  Thumb 6/9/2022 6/9/2022 6/23/2022     AROM  (PROM) R L R   MP /25 /54 /35   IP /25 /53 /45   RABD 35 32 38   PABD 41 40 48   Retropulsion (cm) half cm 1 cm    Kapandji Opposition Scale (0-10/10) 9 9      ROM  Pain Report: - none  + mild    ++ moderate    +++ severe   Wrist 6/9/2022 6/9/2022 6/23/2022     AROM (PROM) R L R   Extension WFL WFL 75   Flexion 48 \" 66   RD  \" 10   UD  \" 55   Supination WFL \" WFL   Pronation \" \" WFL       Thumb Observation/Appearance   - none  + mild    ++ moderate    +++ severe     6/9/2022   observations Well healed incision. Mild edema to fingers, dorsal hand      Strength   (Measured in pounds)  deferred    Strength:  Pain-free /Pinch Test    6/23/2022   Trials R L   1   20 could feel it stretchingtretchign, no pain 35     Pinching - deferred    Assessment:  Response to therapy has been improvement to:  ROM of Thumb, Self Care Skills and functional postures for thumb stabilization.  Appropriateness of Rx " I have re-evaluated this patient and find that the nature, scope, duration and intensity of the therapy is appropriate for the medical condition of the patient.  Overall Assessment:  Patient is progressing well.  Patient would benefit from further evaluation of:  The R thumb (seeing MD soon)  STG/LTG:  See goal sheet for details and updates.    Plan:  Frequency/Duration:  Discharge from Hand Therapy with this service; continue home program.

## 2022-06-24 ENCOUNTER — TELEPHONE (OUTPATIENT)
Dept: TRANSPLANT | Facility: CLINIC | Age: 72
End: 2022-06-24

## 2022-06-24 ENCOUNTER — LAB (OUTPATIENT)
Dept: LAB | Facility: CLINIC | Age: 72
End: 2022-06-24
Payer: MEDICARE

## 2022-06-24 DIAGNOSIS — Z13.220 LIPID SCREENING: ICD-10-CM

## 2022-06-24 DIAGNOSIS — R79.89 ELEVATED SERUM CREATININE: ICD-10-CM

## 2022-06-24 DIAGNOSIS — Z94.4 LIVER REPLACED BY TRANSPLANT (H): ICD-10-CM

## 2022-06-24 LAB
ALBUMIN SERPL-MCNC: 3.1 G/DL (ref 3.4–5)
ALP SERPL-CCNC: 166 U/L (ref 40–150)
ALT SERPL W P-5'-P-CCNC: 67 U/L (ref 0–50)
ANION GAP SERPL CALCULATED.3IONS-SCNC: 9 MMOL/L (ref 3–14)
AST SERPL W P-5'-P-CCNC: 39 U/L (ref 0–45)
BILIRUB DIRECT SERPL-MCNC: 0.1 MG/DL (ref 0–0.2)
BILIRUB SERPL-MCNC: 0.3 MG/DL (ref 0.2–1.3)
BUN SERPL-MCNC: 44 MG/DL (ref 7–30)
CALCIUM SERPL-MCNC: 8.9 MG/DL (ref 8.5–10.1)
CHLORIDE BLD-SCNC: 98 MMOL/L (ref 94–109)
CO2 SERPL-SCNC: 28 MMOL/L (ref 20–32)
CREAT SERPL-MCNC: 1.82 MG/DL (ref 0.52–1.04)
ERYTHROCYTE [DISTWIDTH] IN BLOOD BY AUTOMATED COUNT: 14.6 % (ref 10–15)
GFR SERPL CREATININE-BSD FRML MDRD: 29 ML/MIN/1.73M2
GLUCOSE BLD-MCNC: 186 MG/DL (ref 70–99)
HCT VFR BLD AUTO: 37.6 % (ref 35–47)
HGB BLD-MCNC: 12.1 G/DL (ref 11.7–15.7)
MCH RBC QN AUTO: 30 PG (ref 26.5–33)
MCHC RBC AUTO-ENTMCNC: 32.2 G/DL (ref 31.5–36.5)
MCV RBC AUTO: 93 FL (ref 78–100)
PLATELET # BLD AUTO: 336 10E3/UL (ref 150–450)
POTASSIUM BLD-SCNC: 4 MMOL/L (ref 3.4–5.3)
PROT SERPL-MCNC: 7.1 G/DL (ref 6.8–8.8)
RBC # BLD AUTO: 4.03 10E6/UL (ref 3.8–5.2)
SODIUM SERPL-SCNC: 135 MMOL/L (ref 133–144)
WBC # BLD AUTO: 11.8 10E3/UL (ref 4–11)

## 2022-06-24 PROCEDURE — 85027 COMPLETE CBC AUTOMATED: CPT

## 2022-06-24 PROCEDURE — 36415 COLL VENOUS BLD VENIPUNCTURE: CPT

## 2022-06-24 PROCEDURE — 80053 COMPREHEN METABOLIC PANEL: CPT

## 2022-06-24 PROCEDURE — 82248 BILIRUBIN DIRECT: CPT

## 2022-06-24 NOTE — TELEPHONE ENCOUNTER
"Creatinine and WBC elevated.  Call to [place] to make sure she is ok.    She says she just finished treatment for a UTI.  She is drinking plenty of fluids \"I'm flooding myself!\"  She will follow up w/ her ID doc and nephrologist in [place].  "

## 2022-07-01 DIAGNOSIS — Z94.4 LIVER REPLACED BY TRANSPLANT (H): ICD-10-CM

## 2022-07-01 RX ORDER — URSODIOL 250 MG/1
250 TABLET, FILM COATED ORAL 2 TIMES DAILY
Qty: 180 TABLET | Refills: 3 | Status: SHIPPED | OUTPATIENT
Start: 2022-07-01

## 2022-07-05 PROBLEM — Z48.89 AFTERCARE FOLLOWING SURGERY FOR INJURY AND TRAUMA: Status: RESOLVED | Noted: 2022-06-10 | Resolved: 2022-07-05

## 2022-07-05 PROBLEM — M79.644 THUMB PAIN, RIGHT: Status: RESOLVED | Noted: 2022-06-10 | Resolved: 2022-07-05

## 2022-07-19 DIAGNOSIS — Z94.4 LIVER REPLACED BY TRANSPLANT (H): ICD-10-CM

## 2022-07-19 RX ORDER — PREDNISONE 10 MG/1
10 TABLET ORAL DAILY
Qty: 90 TABLET | Refills: 3 | Status: SHIPPED | OUTPATIENT
Start: 2022-07-19 | End: 2024-09-05

## 2022-09-21 ENCOUNTER — TELEPHONE (OUTPATIENT)
Dept: TRANSPLANT | Facility: CLINIC | Age: 72
End: 2022-09-21

## 2022-09-21 NOTE — TELEPHONE ENCOUNTER
Spoke with the patient and confirmed  Requested hepatology appointment date of 6/13/2023.  Informed patient an itinerary can be accessed on Deltasightt.

## 2022-10-12 ENCOUNTER — TELEPHONE (OUTPATIENT)
Dept: TRANSPLANT | Facility: CLINIC | Age: 72
End: 2022-10-12

## 2022-10-12 DIAGNOSIS — Z94.4 LIVER REPLACED BY TRANSPLANT (H): Primary | ICD-10-CM

## 2022-10-12 RX ORDER — SIROLIMUS 0.5 MG/1
0.5 TABLET, FILM COATED ORAL DAILY
Qty: 90 TABLET | Refills: 3 | Status: SHIPPED | OUTPATIENT
Start: 2022-10-12 | End: 2022-12-27

## 2022-10-12 NOTE — TELEPHONE ENCOUNTER
Provider Call: General  Route to LPN    Reason for call: Pharamcist was looking to speak with someone in regards to recent rx for sirolimus.     Call back needed? Yes    Return Call Needed  Same as documented in contacts section  When to return call?: Same day: Route High Priority

## 2022-10-12 NOTE — TELEPHONE ENCOUNTER
Pt currently taking sirolimus 1 mg daily. Pt's level 11.6. pt will decrease to 1 mg every other day until she picks up the 0.5 mg tablets. Pt to repeat labs in 1.5-2 weeks.

## 2022-10-12 NOTE — TELEPHONE ENCOUNTER
Spoke to the pharmacist who wanted to make that there is a contraindication for using Sirolimus for liver transplant patients and wanted to know if it was ok to use. Informed Antoine that sirolimus is a common IS used for our liver tx pts. Antoine just wanted to make sure. COLE

## 2022-10-16 ENCOUNTER — HEALTH MAINTENANCE LETTER (OUTPATIENT)
Age: 72
End: 2022-10-16

## 2022-10-20 ENCOUNTER — TELEPHONE (OUTPATIENT)
Dept: TRANSPLANT | Facility: CLINIC | Age: 72
End: 2022-10-20

## 2022-10-20 NOTE — TELEPHONE ENCOUNTER
Spoke with [place]. Pt has 2 tears in rotator cuff. Pt is on prednisone taper. Pt's doctor has her on 16 mg prednisone and will taper down. Pt also messed up her thumb. She did get cortisone injection and wearing brace.     Pt also has afib. Currently wearing heart monitor. She had her metoprolol to 100 mg.     Pt saw dietician and endocrine due to elevated blood sugars.

## 2022-11-03 DIAGNOSIS — Z94.4 LIVER REPLACED BY TRANSPLANT (H): Primary | ICD-10-CM

## 2022-11-03 DIAGNOSIS — Z13.220 LIPID SCREENING: ICD-10-CM

## 2022-11-07 ENCOUNTER — TELEPHONE (OUTPATIENT)
Dept: ORTHOPEDICS | Facility: CLINIC | Age: 72
End: 2022-11-07

## 2022-11-07 NOTE — TELEPHONE ENCOUNTER
11/07- Spoke to patient and it  sounds  like she has questions and concerns. She would like someone to call her. -LN

## 2022-11-07 NOTE — TELEPHONE ENCOUNTER
Phoned patient back.  She is aware that Dr. Craft is taking a NASH 2nd week of Jan. Patient prefers to continue with conservative treatment at this time and will check back at a later date when Dr. Craft returns to make an appointment.  Patient has our phone number to contact us if things change.    Maria Luisa Vazquez RN on 11/7/2022 at 12:54 PM

## 2022-11-28 DIAGNOSIS — Z94.4 LIVER REPLACED BY TRANSPLANT (H): Primary | ICD-10-CM

## 2022-11-28 RX ORDER — SIROLIMUS 1 MG/1
TABLET, FILM COATED ORAL
Qty: 30 TABLET | Refills: 11 | Status: SHIPPED | OUTPATIENT
Start: 2022-11-28 | End: 2022-12-27

## 2022-12-03 ENCOUNTER — HEALTH MAINTENANCE LETTER (OUTPATIENT)
Age: 72
End: 2022-12-03

## 2022-12-06 ENCOUNTER — TELEPHONE (OUTPATIENT)
Dept: GASTROENTEROLOGY | Facility: CLINIC | Age: 72
End: 2022-12-06

## 2022-12-06 NOTE — TELEPHONE ENCOUNTER
[facility]    Phone Message    May a detailed message be left on voicemail: yes     Reason for Call: Medication Question or concern regarding medication   Prescription Clarification  Name of Medication: sirolimus (GENERIC EQUIVALENT) 1 MG tablet  Prescribing Provider: Gentry Ramirez MD   Pharmacy: [facility] - [facility] [address]      Pt's pharmacy is requesting a call back to get clarification the pt's prescription.        Action Taken: Message routed to:  [facility] ([facility]    Travel Screening: Not Applicable

## 2022-12-07 ENCOUNTER — TELEPHONE (OUTPATIENT)
Dept: TRANSPLANT | Facility: CLINIC | Age: 72
End: 2022-12-07

## 2022-12-07 NOTE — TELEPHONE ENCOUNTER
Informed pharmacist Heydi that pt has been on this drug before per Angelika LAZCANO and it it ok to ship sirolimus.

## 2022-12-07 NOTE — TELEPHONE ENCOUNTER
Informed the pharmacist it is ok to ship the sirolimus even though pt is taking vorconazole per Dr. Ramirez. Pharmacist will reach out the other physician prescribing the med and what kind of monitoring is done. It appears that that pt has not been on this vorconazole for over a year. Pharmacist is a little concerned and wants to make sure that the physician knows that this med will enhance the sirolimus level 10 fold. Please advise.

## 2022-12-07 NOTE — TELEPHONE ENCOUNTER
Informed pt that writer spoke to the pharmacy and cleared things up.   Pt wanted the sirolimus level. Informed pt the level has not been resulted. Suggested that pt call the lab and have them fax results.

## 2022-12-07 NOTE — TELEPHONE ENCOUNTER
Spoke to tech at [facility] who states that pt was mailed Vorconazole 200mg, take one daily prescribed by Joan Decker and shipped on 11/10 to pt. So pt is ready to have the sirolimus shipped but they want to make that we are aware of the interaction and that it's ok to ship the sirolimus. Please advise as soon as you can.

## 2022-12-08 ENCOUNTER — TELEPHONE (OUTPATIENT)
Dept: TRANSPLANT | Facility: CLINIC | Age: 72
End: 2022-12-08

## 2022-12-08 NOTE — TELEPHONE ENCOUNTER
Pt calls to say that she spoke to her lab and requested that her labs results get faxed. They were planning to do this today. COLE

## 2022-12-27 DIAGNOSIS — Z94.4 LIVER REPLACED BY TRANSPLANT (H): ICD-10-CM

## 2022-12-27 RX ORDER — SIROLIMUS 0.5 MG/1
0.5 TABLET, FILM COATED ORAL DAILY
Qty: 90 TABLET | Refills: 3 | Status: SHIPPED | OUTPATIENT
Start: 2022-12-27 | End: 2024-09-05 | Stop reason: DRUGHIGH

## 2023-01-09 NOTE — TELEPHONE ENCOUNTER
See previous MyChart encounter from today, 9/23/19 for details.   Shanna Lynch is a 14 year old female here with mother presenting with:    Symptoms: Patient states bilateral ear pain since last Tuesday. Mild jaw pain and sore throat. Per patient also has a productive cough, runny nose, and headache.     1 day hx of left eye swelling and redness. States affected area is itching. Per patient left eye was crusted shut upon waking this AM.     Denies: Fever/chills, drainage from ears, injury to ears, blurry vision, SOB, chest pain     OTC medications: Mucinex, lidocaine ear drops    Recent ABX use: Denies     School note needed: Yes     DC Devices marlon: Yes

## 2023-04-01 ENCOUNTER — HEALTH MAINTENANCE LETTER (OUTPATIENT)
Age: 73
End: 2023-04-01

## 2023-05-05 ENCOUNTER — DOCUMENTATION ONLY (OUTPATIENT)
Dept: TRANSPLANT | Facility: CLINIC | Age: 73
End: 2023-05-05
Payer: MEDICARE

## 2023-05-24 ENCOUNTER — TRANSFERRED RECORDS (OUTPATIENT)
Dept: HEALTH INFORMATION MANAGEMENT | Facility: CLINIC | Age: 73
End: 2023-05-24

## 2023-06-12 ENCOUNTER — HOSPITAL ENCOUNTER (EMERGENCY)
Facility: CLINIC | Age: 73
Discharge: HOME | End: 2023-06-12
Attending: EMERGENCY MEDICINE | Admitting: EMERGENCY MEDICINE
Payer: MEDICARE

## 2023-06-12 VITALS
BODY MASS INDEX: 26.39 KG/M2 | WEIGHT: 171 LBS | RESPIRATION RATE: 18 BRPM | OXYGEN SATURATION: 98 % | SYSTOLIC BLOOD PRESSURE: 180 MMHG | DIASTOLIC BLOOD PRESSURE: 92 MMHG | TEMPERATURE: 97.5 F | HEART RATE: 65 BPM

## 2023-06-12 DIAGNOSIS — I87.8 VENOUS STASIS: ICD-10-CM

## 2023-06-12 DIAGNOSIS — R23.8 BULLAE: ICD-10-CM

## 2023-06-12 PROCEDURE — 99282 EMERGENCY DEPT VISIT SF MDM: CPT

## 2023-06-12 ASSESSMENT — ACTIVITIES OF DAILY LIVING (ADL): ADLS_ACUITY_SCORE: 35

## 2023-06-12 NOTE — ED TRIAGE NOTES
Pt noticed R anterior leg wound; seen at PCP pt had it lanced. Pt flew back from [place] today. Denies numbness or tingling more than baseline. Afebrile. Pt was taking bactrim for UTI for past 12 days. Pt is immunosuppressed due to liver transplant 20 years ago, and DM2. ABC in tact. A/OX4

## 2023-06-12 NOTE — ED PROVIDER NOTES
ED ATTENDING PHYSICIAN NOTE:   I evaluated this patient in conjunction with Dora Arredondo PA-C  I have participated in the care of the patient and personally performed key elements of the history, exam, and medical decision making.      HPI:   Luz Thompson is a 73 year old female with a history of liver transplant in 2002 with chronic kidney disease, anticoagulated, who presents emergency department with concerns for lower extremity wounds that are open and weeping.  She had recently lanced blister with clear liquid and since that was developed more blisters.  She has not been using her compression stockings as normal due to the blisters.  She denies any change in the swelling or new/worsening redness.  She denies fever or chills.  She is currently on Bactrim for past UTI.    Independent Historian:   None - Patient Only    Review of External Notes: Reviewed outpatient office visit from 6/9/2023     EXAM:   BP (!) 180/92 (BP Location: Right arm, Patient Position: Semi-Mckinley's, Cuff Size: Adult Regular)   Pulse 65   Temp 97.5  F (36.4  C) (Oral)   Resp 18   Wt 77.6 kg (171 lb)   LMP 06/01/1988 (Approximate)   SpO2 98%   BMI 26.39 kg/m      General: Elderly female sitting upright  CV:  Regular rate and rhythm  Resp:  Normal respiratory effort.  MSK: Bilateral lower extremity edema. Nontender. Normal active range of motion.  Skin: Warm and dry.  Erythema of the bilateral lower legs not including the feet and distal to the knee.  Not circumferential.  Open blister over the left lateral leg.  Scattered bullae of the right anterior lower leg with clear fluid.  No streaking erythema.    Neuro: Alert and oriented. Responds appropriately to all questions and commands. No focal findings appreciated. Normal muscle tone.  Psych: Normal mood and affect. Pleasant.      Social Determinants of Health affecting care:   None     MEDICAL DECISION MAKING/ASSESSMENT AND PLAN:    Luz Thompson is a 73-year-old female on  Bactrim for UTI with a history of liver transplant and chronic kidney disease who is anticoagulated presents emergency department for evaluation of bullae over lower extremity.  She has chronic lower extremity edema and erythema which she notes is not significantly changed.  Exam consistent with likely sequelae of venous stasis disease.  At this time no convincing evidence of cellulitis, however clinical decision difficult due to chronic issues.  She is not febrile or toxic.  Her medical conditions were included in decision making, but at this time no convincing evidence that she would benefit from antibiotics.  She feels comfortable with that plan at this time however is cautioned to be reassessed in 2 days with her primary care provider and return me to the emergency department with increase spreading of redness, pain, fever or chills, or any other symptom concerning to her.  Abortive care measures discussed at home.  All questions were answered prior to discharge.     DIAGNOSIS:     ICD-10-CM    1. Venous stasis  I87.8       2. Bullae  R23.8                DISPOSITION:    Discharged home in stable condition.     Surekha Falcon MD  6/12/2023  [facility] EMERGENCY DEPT     Surekha Falcon MD  06/14/23 1030

## 2023-06-12 NOTE — DISCHARGE INSTRUCTIONS
- Keep wounds covered with non-adherent dressing and compressed (either with ACE wraps or compression stockings)  - Try to keep legs elevated as much as possible  - Can consider increasing Lasix from 20 mg daily to 40 mg daily  - Follow up with primary care provider in the next 3-4 days  - If you develop any fever, worsening of redness, return to the emergency department

## 2023-06-12 NOTE — ED PROVIDER NOTES
"  History     Chief Complaint:  Wound Check       HPI   Luz Thompson is a 73 year old female with a history of CKD, liver transplant in 2002, type 2 DM presenting today for evaluation of bilateral lower extremity wounds. She first saw a large blister on the left lower extremity since 6/7. Saw her PCP in [place] who \"lanced\" the blister, releasing clear liquid. Since then, she has kept the wound covered but has developed further blisters on the right lower extremity and more on the left. She reports no change in her chronic lower extremity edema. There is surrounding redness that is stable. No fevers, difficulty breathing, chest pain, calf pain.     Independent Historian:   None - Patient Only    Review of External Notes:   Office visit with Rona Reardon on 6/9/23     Medications:    acetaminophen (TYLENOL) 325 MG tablet  apixaban ANTICOAGULANT (ELIQUIS ANTICOAGULANT) 2.5 MG tablet  blood glucose (ACCU-CHEK GUIDE) test strip  blood glucose monitoring (ACCU-CHEK FASTCLIX) lancets  calcitRIOL (ROCALTROL) 0.5 MCG capsule  clotrimazole (LOTRIMIN) 1 % external cream  estradiol (ESTRACE) 0.1 MG/GM vaginal cream  evolocumab (REPATHA SURECLICK) 140 MG/ML prefilled autoinjector  ezetimibe (ZETIA) 10 MG tablet  ferrous gluconate (FERGON) 324 (38 Fe) MG tablet  folic acid (FOLVITE) 1 MG tablet  furosemide (LASIX) 20 MG tablet  glucose (BD GLUCOSE) 5 g chewable tablet  glucose 40 % (400 mg/mL) gel  Guar Gum (FIBER MODULAR, NUTRISOURCE FIBER,) packet  hypromellose (ARTIFICIAL TEARS) 0.4 % SOLN ophthalmic solution  levothyroxine (SYNTHROID/LEVOTHROID) 150 MCG tablet  levothyroxine (SYNTHROID/LEVOTHROID) 175 MCG tablet  linagliptin (TRADJENTA) 5 MG TABS tablet  losartan (COZAAR) 25 MG tablet  meclizine (ANTIVERT) 25 MG tablet  metoprolol succinate ER (TOPROL-XL) 50 MG 24 hr tablet  metroNIDAZOLE (METROGEL) 0.75 % external gel  Multiple Vitamins-Minerals (PRESERVISION AREDS 2) CAPS  omega-3 acid ethyl esters (LOVAZA) 1 g " capsule  order for DME  predniSONE (DELTASONE) 10 MG tablet  sennosides (SENOKOT) 8.6 MG tablet  sirolimus (GENERIC EQUIVALENT) 0.5 MG tablet  SUMAtriptan (IMITREX) 50 MG tablet  ursodiol (ACTIGALL) 250 MG tablet      Past Medical History:    Past Medical History:   Diagnosis Date     Anemia of other chronic disease 10/17/2011     Anxiety      Bladder infection, chronic 4/4/2012     CKD (chronic kidney disease) stage 3, GFR 30-59 ml/min (H) 4/4/2012     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     CVA (cerebral vascular accident) (H) 2001     Diverticulosis of sigmoid colon 12/21/2013     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      H/O esophageal varices      Hearing loss      Heart murmur 4/4/2012     History of DVT (deep vein thrombosis)      History of [facility] fever      History of thyroid cancer 9/25/2012     Hyperlipidemia 4/10/2012     Hyperlipidemia LDL goal <70      Hypertension goal BP (blood pressure) < 140/80 11/06/2013     Hypertriglyceridemia      Liver replaced by transplant (H) 10/17/2011     Macular degeneration      Migraines 4/4/2012     Nonsenile cataract      Osteoarthritis of right knee 8/2/2012     Osteoporosis 4/20/2012     Paroxysmal A-fib (H) 6/13/2017     Postablative hypothyroidism 8/13/2012     Primary biliary cirrhosis (H)      Sjogren's syndrome (H)      Type 2 diabetes, HbA1c goal < 7% (H)      Unspecified glaucoma(365.9)      Vitamin D deficiency 10/1/2012     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     Past Surgical History:    Past Surgical History:   Procedure Laterality Date     APPENDECTOMY  1961     BIOPSY       CATARACT IOL, RT/LT      RE12/19/2013, LE12/10/2013 - Toric lenses     CHOLECYSTECTOMY  1991     COLONOSCOPY  3/10/2014    Procedure: COLONOSCOPY;;  Surgeon: Gentry Ramirez MD;  Location:  GI     ear drum repair       ENDOBRONCHIAL ULTRASOUND FLEXIBLE N/A 9/29/2017    Procedure: ENDOBRONCHIAL ULTRASOUND FLEXIBLE;  Flexible Bronchoscopy, Endobronchial Ultrasound, Transbronchial Needle Aspiration  ;  Surgeon: Eden Clinton MD;  Location: [facility] OR     ENDOSCOPIC RETROGRADE CHOLANGIOPANCREATOGRAM  9/19/2013    Procedure: ENDOSCOPIC RETROGRADE CHOLANGIOPANCREATOGRAM;  Endoscopic Retrograde Cholangiopancreatogram with single balloon enteroscopy, ballon sweep of bile duct;  Surgeon: Brett Membreno MD;  Location: [facility] OR      KNEE SCOPE,MED/LAT MENISECTOMY  8/10/12    Right, partial medial menisectomy only     KNEE SURGERY  1966    R knee     PICC INSERTION  9/18/2013    4fr SL PASV PICC, 40cm (1cm external) in the R basilic vein w/ tip in the low SVC     PICC INSERTION  2/21/2014    5 fr DL BioFlo Navilyst PICC, 46 cm (3 cm external) in the L basilic vein w/ tip in the SVC RA junction.     THYROIDECTOMY  3/2010     TRANSPLANT LIVER RECIPIENT LIVING UNRELATED          Physical Exam     Patient Vitals for the past 24 hrs:   BP Temp Temp src Pulse Resp SpO2 Weight   06/12/23 0956 (!) 154/64 97.9  F (36.6  C) Temporal 79 16 100 % 77.6 kg (171 lb)        Physical Exam  BP (!) 154/64   Pulse 79   Temp 97.9  F (36.6  C) (Temporal)   Resp 16   Wt 77.6 kg (171 lb)   LMP 06/01/1988 (Approximate)   SpO2 100%   BMI 26.39 kg/m     General: Patient appears comfortable.   Head: Atraumatic. Normocephalic.  EENT: PERRL. EOMI. Moist mucus membranes.   CV: Regular rate and rhythm. No appreciable murmurs, rubs, or gallops.   Respiratory: Breathing comfortably on room air. Lungs clear to auscultation bilaterally without wheezes, rhonchi, or rales.  GI: Soft, non-distended. Non-tender abdomen. No rebound, rigidity, or guarding.   Msk: Bilateral lower extremities with diffuse, poorly demarcated erythema extending from the knees to the ankle. 1+ firm, pitting edema bilaterally. Scattered bullae with clear fluid visible. No streaking.  Skin: Warm and dry. No rashes.  Neuro: Awake, alert, and conversant. No focal neurologic deficits.   Psych: Appropriate mood and affect.    Emergency Department Course   Emergency  Department Course & Assessments:  Assessments and Consultations:  1113   I performed my initial evaluation of the patient    Independent Interpretation (X-rays, CTs, rhythm strip):  None    Social Determinants of Health affecting care:   None    Disposition:  The patient was discharged to home.     Impression & Plan    Medical Decision Makin-year-old medically complex female presenting with bilateral lower extremity edema, erythema, bullae.  Differential diagnosis includes venous stasis changes, cellulitis, DVT.  She has been on Bactrim for about 1 week now for urinary tract infection.  I doubt this is cellulitis as it involves both lower extremities without streaking and if it were, she would be on an appropriate antibiotic to treat for this.  She is anticoagulated on Eliquis so I doubt DVT.  Examination is most consistent with venous stasis changes as her lower extremities are symmetrical in appearance.      We discussed at length management of this in the outpatient setting.  We discussed the importance of covering any open wounds with a nonadherent dressing and compressing the lower extremities to improve edema.  Her wounds were dressed and wrapped with an ACE wrap here.  She should keep her legs elevated when possible.  We also discussed option to increase her Lasix.  She should follow-up with her primary care provider this week for further discussion and recheck of her wounds.    The patient and her daughter feel comfortable with this plan.  She is safe for discharge to home.  Return precautions were discussed and she voiced understanding.    Diagnosis:    ICD-10-CM    1. Venous stasis  I87.8       2. Bullae  R23.8          2023   MIKE Baird Amanda, PA-C  [phone]

## 2023-06-13 ENCOUNTER — HOSPITAL ENCOUNTER (EMERGENCY)
Facility: CLINIC | Age: 73
Discharge: HOME | End: 2023-06-13
Attending: EMERGENCY MEDICINE | Admitting: EMERGENCY MEDICINE
Payer: MEDICARE

## 2023-06-13 ENCOUNTER — APPOINTMENT (OUTPATIENT)
Dept: ULTRASOUND IMAGING | Facility: CLINIC | Age: 73
End: 2023-06-13
Attending: EMERGENCY MEDICINE
Payer: MEDICARE

## 2023-06-13 VITALS
SYSTOLIC BLOOD PRESSURE: 160 MMHG | TEMPERATURE: 98.1 F | HEART RATE: 84 BPM | OXYGEN SATURATION: 97 % | RESPIRATION RATE: 18 BRPM | DIASTOLIC BLOOD PRESSURE: 80 MMHG

## 2023-06-13 DIAGNOSIS — L03.119 CELLULITIS OF LOWER EXTREMITY, UNSPECIFIED LATERALITY: ICD-10-CM

## 2023-06-13 DIAGNOSIS — N28.9 RENAL INSUFFICIENCY: ICD-10-CM

## 2023-06-13 DIAGNOSIS — R60.0 PERIPHERAL EDEMA: ICD-10-CM

## 2023-06-13 LAB
ALBUMIN SERPL BCG-MCNC: 3.8 G/DL (ref 3.5–5.2)
ALP SERPL-CCNC: 113 U/L (ref 35–104)
ALT SERPL W P-5'-P-CCNC: 39 U/L (ref 0–50)
ANION GAP SERPL CALCULATED.3IONS-SCNC: 15 MMOL/L (ref 7–15)
AST SERPL W P-5'-P-CCNC: 34 U/L (ref 0–45)
BASOPHILS # BLD AUTO: 0 10E3/UL (ref 0–0.2)
BASOPHILS NFR BLD AUTO: 0 %
BILIRUB DIRECT SERPL-MCNC: <0.2 MG/DL (ref 0–0.3)
BILIRUB SERPL-MCNC: 0.3 MG/DL
BUN SERPL-MCNC: 48.4 MG/DL (ref 8–23)
CALCIUM SERPL-MCNC: 9.5 MG/DL (ref 8.8–10.2)
CHLORIDE SERPL-SCNC: 97 MMOL/L (ref 98–107)
CREAT SERPL-MCNC: 2.43 MG/DL (ref 0.51–0.95)
CRP SERPL-MCNC: 21.05 MG/L
DEPRECATED HCO3 PLAS-SCNC: 25 MMOL/L (ref 22–29)
EOSINOPHIL # BLD AUTO: 0 10E3/UL (ref 0–0.7)
EOSINOPHIL NFR BLD AUTO: 0 %
ERYTHROCYTE [DISTWIDTH] IN BLOOD BY AUTOMATED COUNT: 16.6 % (ref 10–15)
GFR SERPL CREATININE-BSD FRML MDRD: 20 ML/MIN/1.73M2
GLUCOSE SERPL-MCNC: 166 MG/DL (ref 70–99)
HCT VFR BLD AUTO: 37.1 % (ref 35–47)
HGB BLD-MCNC: 12 G/DL (ref 11.7–15.7)
HOLD SPECIMEN: NORMAL
HOLD SPECIMEN: NORMAL
IMM GRANULOCYTES # BLD: 0.2 10E3/UL
IMM GRANULOCYTES NFR BLD: 2 %
LYMPHOCYTES # BLD AUTO: 0.8 10E3/UL (ref 0.8–5.3)
LYMPHOCYTES NFR BLD AUTO: 8 %
MCH RBC QN AUTO: 29.9 PG (ref 26.5–33)
MCHC RBC AUTO-ENTMCNC: 32.3 G/DL (ref 31.5–36.5)
MCV RBC AUTO: 93 FL (ref 78–100)
MONOCYTES # BLD AUTO: 0.7 10E3/UL (ref 0–1.3)
MONOCYTES NFR BLD AUTO: 7 %
NEUTROPHILS # BLD AUTO: 8.3 10E3/UL (ref 1.6–8.3)
NEUTROPHILS NFR BLD AUTO: 83 %
NRBC # BLD AUTO: 0 10E3/UL
NRBC BLD AUTO-RTO: 0 /100
NT-PROBNP SERPL-MCNC: 1275 PG/ML (ref 0–900)
PLATELET # BLD AUTO: 344 10E3/UL (ref 150–450)
POTASSIUM SERPL-SCNC: 4.3 MMOL/L (ref 3.4–5.3)
PROT SERPL-MCNC: 6.9 G/DL (ref 6.4–8.3)
RBC # BLD AUTO: 4.01 10E6/UL (ref 3.8–5.2)
SODIUM SERPL-SCNC: 137 MMOL/L (ref 136–145)
WBC # BLD AUTO: 10 10E3/UL (ref 4–11)

## 2023-06-13 PROCEDURE — 85014 HEMATOCRIT: CPT | Performed by: EMERGENCY MEDICINE

## 2023-06-13 PROCEDURE — 99284 EMERGENCY DEPT VISIT MOD MDM: CPT | Mod: 25

## 2023-06-13 PROCEDURE — 36415 COLL VENOUS BLD VENIPUNCTURE: CPT | Performed by: EMERGENCY MEDICINE

## 2023-06-13 PROCEDURE — 93970 EXTREMITY STUDY: CPT

## 2023-06-13 PROCEDURE — 83880 ASSAY OF NATRIURETIC PEPTIDE: CPT | Mod: GZ | Performed by: EMERGENCY MEDICINE

## 2023-06-13 PROCEDURE — 250N000013 HC RX MED GY IP 250 OP 250 PS 637: Performed by: EMERGENCY MEDICINE

## 2023-06-13 PROCEDURE — 82248 BILIRUBIN DIRECT: CPT | Performed by: EMERGENCY MEDICINE

## 2023-06-13 PROCEDURE — 86140 C-REACTIVE PROTEIN: CPT | Performed by: EMERGENCY MEDICINE

## 2023-06-13 PROCEDURE — 82310 ASSAY OF CALCIUM: CPT | Performed by: EMERGENCY MEDICINE

## 2023-06-13 RX ORDER — CLINDAMYCIN HCL 150 MG
300 CAPSULE ORAL ONCE
Status: COMPLETED | OUTPATIENT
Start: 2023-06-13 | End: 2023-06-13

## 2023-06-13 RX ORDER — CLINDAMYCIN HCL 300 MG
300 CAPSULE ORAL 4 TIMES DAILY
Qty: 40 CAPSULE | Refills: 0 | Status: SHIPPED | OUTPATIENT
Start: 2023-06-13 | End: 2023-06-15 | Stop reason: SINTOL

## 2023-06-13 RX ADMIN — CLINDAMYCIN HYDROCHLORIDE 300 MG: 150 CAPSULE ORAL at 19:40

## 2023-06-13 ASSESSMENT — ACTIVITIES OF DAILY LIVING (ADL)
ADLS_ACUITY_SCORE: 35
ADLS_ACUITY_SCORE: 33

## 2023-06-13 NOTE — ED TRIAGE NOTES
Patient reports she was seen in this ED yesterday for the same issue. Patient states she continues to have redness to her left leg with continued blisters and sores to her bilateral legs. Patient's legs are wrapped in triage.      Triage Assessment     Row Name 06/13/23 1546       Triage Assessment (Adult)    Airway WDL WDL       Respiratory WDL    Respiratory WDL WDL       Skin Circulation/Temperature WDL    Skin Circulation/Temperature WDL WDL       Cardiac WDL    Cardiac WDL WDL       Peripheral/Neurovascular WDL    Peripheral Neurovascular WDL WDL       Cognitive/Neuro/Behavioral WDL    Cognitive/Neuro/Behavioral WDL WDL

## 2023-06-13 NOTE — ED NOTES
Rapid Assessment Note    History:   Luz Thompson is a 73 year old female who presents with wound redness. The patient states that she developed a blister on her left ankle about 5 days ago. She went into the doctor and had the blister lanced since she uses compression socks. The wound was lanced but noticed today that the site became more red and swollen. Her legs have also been getting more painful. She denies any chest pain or fever. She is currently on bactrim for a UTI and Eliquis. She notes she is immune deficient and thinks she is low on B12.    Exam:   General:  Alert, interactive  Cardiovascular:  Well perfused  Lungs:  No respiratory distress, no accessory muscle use  Neuro:  Moving all 4 extremities  MSK: Lower extremity compression wraps in place.  Left leg unwrapped and evidence of ruptured blisters, erythema along anterior and lateral aspect of leg  Skin:  Warm, dry  Psych:  Normal affect      Plan of Care:   I evaluated the patient and developed an initial plan of care. I discussed this plan and explained that I, or one of my partners, would be returning to complete the evaluation.     Will obtain labs and US.    I, Ben Douglass, am serving as a scribe to document services personally performed by Tomasz Hager MD based on my observations and the provider's statements to me.    6/13/2023  [employer]    Portions of this medical record were completed by a scribe. UPON MY REVIEW AND AUTHENTICATION BY ELECTRONIC SIGNATURE, this confirms (a) I performed the applicable clinical services, and (b) the record is accurate.      Tomasz Hager MD  06/13/23 5495

## 2023-06-13 NOTE — ED PROVIDER NOTES
"  History     Chief Complaint:  Leg Swelling    The history is provided by the patient.      Luz Thompson is a 73 year old female on Eliquis with leg swelling. The patient first started to notice developing blisters on her anterior bilateral lower legs 6/7/23. She saw her PCP in [place] who \"lanced\" these blisters, expressing clear fluid. She has been keeping these wounds covered and wrapped ever since, but continues to develop more/larger blisters, but still always at the bilateral anterior lower legs. She was evaluated for the same thing in the ED yesterday, and comes again today for continued redness and blistering. She denies any other areas of involvement. Also denies any changes in her leg swelling. Denies any nausea, vomiting, fevers, or dysuria. Patient says she has an appointment with primary care in [place] tomorrow.     Independent Historian:   None    Review of External Notes:   None     Medications:    Actigall  Imitrex  Sirolimus  Sennosides  Deltasone  Lovaza  Toprol  Antivert  Cozaar  Tradjenta  Synthroid  Lasix  Flovite  Fergon  Zetia  Repatha sureclick  Estrace  Eliquis     Past Medical History:    Anemia   Anxiety  Chronic bladder infection  Stage III CKD  Coccidioidomycosis  Stroke  diverticulossis of sigmmoid colon  Waqas-Barr virus  Esophageal varices  Hearing loss  Heart murmur  DVT  Thyroid cancer  Hyperlipidemia  Hypertension  Liver transplant  Macular degeneration  Migraines  nonsenile cataract  Osteoarthritis  Osteoporosis  Paroxysmal atrial fibrillation  Vitamin D deficiency  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     CV:     Regular rate and rhythm.      No murmurs, rubs or gallops.       2+ DP pulses bilateral.       Bilateral lower extremity edema.  PULM:    Clear to auscultation bilateral.       No respiratory distress.      Good air exchange.  ABD:    Soft, non-tender, non-distended.       No pulsatile masses.       No rebound, guarding or rigidity.  MSK:     Left lower extremity:      Compartments are soft and compressible.      Poorly-circumscribed erythematous patch without tenderness, warmth or induration      Recently measured bulla with superficial ulceration.     Right lower extremity:      Compartments are soft and compressible.      Poorly-circumscribed erythematous patch without tenderness, warmth or induration      Recently measured bulla with superficial ulceration.  LYMPH:   No cervical lymphadenopathy.  NEURO:   Alert ; good muscle tone     No tremor     Strength  and sensation intact to lower extremities  Skin:    Warm, dry      Negative Sesay sign  Psych:    Mood is good and affect is appropriate.      Emergency Department Course     Imaging:  US Lower Extremity Venous Duplex Bilateral   Final Result   IMPRESSION:   1.  No deep venous thrombosis in the bilateral lower extremities.      2.  Baker's cyst right popliteal fossa, as above         Report per radiology    Laboratory:  Labs Ordered and Resulted from Time of ED Arrival to Time of ED Departure   BASIC METABOLIC PANEL - Abnormal       Result Value    Sodium 137      Potassium 4.3      Chloride 97 (*)     Carbon Dioxide (CO2) 25      Anion Gap 15      Urea Nitrogen 48.4 (*)     Creatinine 2.43 (*)     Calcium 9.5      Glucose 166 (*)     GFR Estimate 20 (*)    CRP INFLAMMATION - Abnormal    CRP Inflammation 21.05 (*)    NT PROBNP INPATIENT - Abnormal    N terminal Pro BNP Inpatient 1,275 (*)     CBC WITH PLATELETS AND DIFFERENTIAL - Abnormal    WBC Count 10.0      RBC Count 4.01      Hemoglobin 12.0      Hematocrit 37.1      MCV 93      MCH 29.9      MCHC 32.3      RDW 16.6 (*)     Platelet Count 344      % Neutrophils 83      % Lymphocytes 8      % Monocytes 7      % Eosinophils 0      % Basophils 0      % Immature Granulocytes 2      NRBCs per 100 WBC 0      Absolute Neutrophils 8.3      Absolute Lymphocytes 0.8      Absolute Monocytes 0.7      Absolute Eosinophils 0.0      Absolute Basophils 0.0      Absolute Immature Granulocytes 0.2      Absolute NRBCs 0.0     HEPATIC FUNCTION PANEL     Emergency Department Course & Assessments:       Interventions:  Medications   clindamycin (CLEOCIN) capsule 300 mg (has no administration in time range)        Assessments:  192 I obtained history and examined the patient as noted above. Explained findings. Prepared for discharge.     Independent Interpretation (X-rays, CTs, rhythm strip):  None    Consultations/Discussion of Management or Tests:  None        Social Determinants of Health affecting care:   None    Disposition:  The patient was discharged to home.     Impression & Plan      Medical Decision Makin year old female presents with bilateral lower extremity edema and developing erythema.  no evidence of DVT on ultrasound.  No findings to suggest arterial sufficiency.  t is unclear whether there is clearly secondary cellulitis or not.  I will error on the side of caution anb initiate clindamycin. She was recently started Bactrim for UTI.  Low suspicion for adverse drug effect. She has completed a full 7 days and I will have her discontinue Bactrim.  Symptoms may be related to venous stasis alone.  Compression stockings recommended at all times. Close follow up in 2-4 days.    Diagnosis:    ICD-10-CM    1. Cellulitis of lower extremity, unspecified laterality  L03.119       2. Peripheral edema  R60.9       3. Renal insufficiency  N28.9             Discharge Medications:  New Prescriptions    CLINDAMYCIN (CLEOCIN) 300 MG CAPSULE    Take 1 capsule (300 mg) by mouth 4 times daily for 10 days      Scribe Disclosure:  I, Brijesh Erika, am serving as a scribe at 6:41 PM on 6/13/2023 to document services personally performed by Jm Cano MD based on my observations and the provider's statements to me.   6/13/2023   Jm Cano MD Matthews, Jeremiah R, MD  06/13/23 5994

## 2023-06-14 NOTE — DISCHARGE INSTRUCTIONS
Please follow your primary care doctor the next 2-4 days.    Please discontinue Bactrim as this could cause a similar rash or symptoms.    We are starting you on a new antibiotic called clindamycin for potential cellulitis/skin infection of your legs.    Discharge Instructions  Cellulitis    Cellulitis is an infection of the skin that occurs when bacteria enter the skin.   Symptoms are generally redness, swelling, warmth and pain.  Your infection appeared to be appropriate to treat at home with antibiotics.  However, sometimes your infection may be worse than it seemed at first, or may worsen with time. If you have new or worse symptoms, you may need to be seen again in the Emergency Department or by your primary provider.    Generally, every Emergency Department visit should have a follow-up clinic visit with either a primary or a specialty clinic/provider. Please follow-up as instructed by your emergency provider today.    Return to the Emergency Department if:  The redness, pain, or swelling gets a lot worse.  If the red area was marked, return if it is red significantly beyond the marked area.  You are unable to get your antibiotics, or are vomiting (throwing up) these pills, or you cannot take them.  You are feeling more ill, weak or lightheaded.  You start to run a new fever (temperature >101 F).  Anything else about the infection worries or concerns you.  Treatment:  Start your antibiotics right away, and take them as prescribed. Be sure to finish the whole prescription, even if you are better.  Apply a heating pad, warm packs, or warm water soaks to the infected area for 15 minutes at a time, at least 3 times a day. Do not use a heating pad on your feet or legs if you have diabetes. Do not sleep with a heating pad on, since this can cause burns or skin injury.  Rest your injured area for at least 1-2 days. After that you may start using your extremity again as long as there is not too much pain.   Raise the  injured area above the level of your heart as much as possible in the first 1-2 days.  Tylenol  (acetaminophen), Motrin  (ibuprofen), or Advil  (ibuprofen) may help may help reduce pain and fever and may help you feel more comfortable. Be sure to read and follow the package directions, and ask your provider if you have questions.    If you were given a prescription for medicine here today, be sure to read all of the information (including the package insert) that comes with your prescription.  This will include important information about the medicine, its side effects, and any warnings that you need to know about.  The pharmacist who fills the prescription can provide more information and answer questions you may have about the medicine.  If you have questions or concerns that the pharmacist cannot address, please call or return to the Emergency Department.     Remember that you can always come back to the Emergency Department if you are not able to see your regular provider in the amount of time listed above, if you get any new symptoms, or if there is anything that worries you.

## 2023-06-15 ENCOUNTER — OFFICE VISIT (OUTPATIENT)
Dept: FAMILY MEDICINE | Facility: CLINIC | Age: 73
End: 2023-06-15
Payer: MEDICARE

## 2023-06-15 VITALS
OXYGEN SATURATION: 98 % | RESPIRATION RATE: 14 BRPM | DIASTOLIC BLOOD PRESSURE: 78 MMHG | TEMPERATURE: 97.4 F | SYSTOLIC BLOOD PRESSURE: 126 MMHG | WEIGHT: 178 LBS | HEART RATE: 77 BPM | BODY MASS INDEX: 27.47 KG/M2

## 2023-06-15 DIAGNOSIS — R20.0 NUMBNESS AROUND MOUTH: ICD-10-CM

## 2023-06-15 DIAGNOSIS — L03.119 CELLULITIS OF LOWER EXTREMITY, UNSPECIFIED LATERALITY: Primary | ICD-10-CM

## 2023-06-15 LAB
ALBUMIN SERPL BCG-MCNC: 3.8 G/DL (ref 3.5–5.2)
ALP SERPL-CCNC: 108 U/L (ref 35–104)
ALT SERPL W P-5'-P-CCNC: 39 U/L (ref 0–50)
ANION GAP SERPL CALCULATED.3IONS-SCNC: 16 MMOL/L (ref 7–15)
AST SERPL W P-5'-P-CCNC: 28 U/L (ref 0–45)
BILIRUB SERPL-MCNC: 0.2 MG/DL
BUN SERPL-MCNC: 50.3 MG/DL (ref 8–23)
CALCIUM SERPL-MCNC: 9.9 MG/DL (ref 8.8–10.2)
CHLORIDE SERPL-SCNC: 98 MMOL/L (ref 98–107)
CREAT SERPL-MCNC: 2.92 MG/DL (ref 0.51–0.95)
CRP SERPL-MCNC: 9.4 MG/L
DEPRECATED HCO3 PLAS-SCNC: 26 MMOL/L (ref 22–29)
ERYTHROCYTE [DISTWIDTH] IN BLOOD BY AUTOMATED COUNT: 16.9 % (ref 10–15)
GFR SERPL CREATININE-BSD FRML MDRD: 16 ML/MIN/1.73M2
GLUCOSE SERPL-MCNC: 112 MG/DL (ref 70–99)
HCT VFR BLD AUTO: 38.4 % (ref 35–47)
HGB BLD-MCNC: 12.1 G/DL (ref 11.7–15.7)
MCH RBC QN AUTO: 29 PG (ref 26.5–33)
MCHC RBC AUTO-ENTMCNC: 31.5 G/DL (ref 31.5–36.5)
MCV RBC AUTO: 92 FL (ref 78–100)
PLATELET # BLD AUTO: 345 10E3/UL (ref 150–450)
POTASSIUM SERPL-SCNC: 4.2 MMOL/L (ref 3.4–5.3)
PROT SERPL-MCNC: 7 G/DL (ref 6.4–8.3)
RBC # BLD AUTO: 4.17 10E6/UL (ref 3.8–5.2)
SODIUM SERPL-SCNC: 140 MMOL/L (ref 136–145)
VIT B12 SERPL-MCNC: 456 PG/ML (ref 232–1245)
WBC # BLD AUTO: 7.6 10E3/UL (ref 4–11)

## 2023-06-15 PROCEDURE — 85027 COMPLETE CBC AUTOMATED: CPT | Performed by: FAMILY MEDICINE

## 2023-06-15 PROCEDURE — 80053 COMPREHEN METABOLIC PANEL: CPT | Performed by: FAMILY MEDICINE

## 2023-06-15 PROCEDURE — 99204 OFFICE O/P NEW MOD 45 MIN: CPT | Performed by: FAMILY MEDICINE

## 2023-06-15 PROCEDURE — 86140 C-REACTIVE PROTEIN: CPT | Performed by: FAMILY MEDICINE

## 2023-06-15 PROCEDURE — 82607 VITAMIN B-12: CPT | Performed by: FAMILY MEDICINE

## 2023-06-15 PROCEDURE — 36415 COLL VENOUS BLD VENIPUNCTURE: CPT | Performed by: FAMILY MEDICINE

## 2023-06-15 RX ORDER — AZELASTINE 1 MG/ML
1 SPRAY, METERED NASAL PRN
COMMUNITY

## 2023-06-15 RX ORDER — LEVOTHYROXINE SODIUM 200 UG/1
1 TABLET ORAL
COMMUNITY
Start: 2023-06-06 | End: 2024-09-09

## 2023-06-15 RX ORDER — METOPROLOL SUCCINATE 25 MG/1
25 TABLET, EXTENDED RELEASE ORAL DAILY
COMMUNITY
Start: 2023-05-23 | End: 2024-10-03

## 2023-06-15 RX ORDER — SULFAMETHOXAZOLE/TRIMETHOPRIM 800-160 MG
1 TABLET ORAL 2 TIMES DAILY
Qty: 14 TABLET | Refills: 0 | Status: SHIPPED | OUTPATIENT
Start: 2023-06-15 | End: 2023-06-22

## 2023-06-15 NOTE — PROGRESS NOTES
"  Assessment & Plan     Cellulitis of lower extremity, unspecified laterality  Start Bactrim. Recheck CBC, CRP  - CBC with platelets; Future  - CRP, inflammation; Future  - sulfamethoxazole-trimethoprim (BACTRIM DS) 800-160 MG tablet; Take 1 tablet by mouth 2 times daily for 7 days  - CBC with platelets  - CRP, inflammation    Numbness around mouth  ? reaction to antibiotic. Will check metabolic panel and B12 as well.  - Vitamin B12; Future  - Comprehensive metabolic panel (BMP + Alb, Alk Phos, ALT, AST, Total. Bili, TP); Future  - Vitamin B12  - Comprehensive metabolic panel (BMP + Alb, Alk Phos, ALT, AST, Total. Bili, TP)      BMI:   Estimated body mass index is 27.47 kg/m  as calculated from the following:    Height as of 9/19/19: 1.715 m (5' 7.5\").    Weight as of this encounter: 80.7 kg (178 lb).         Eben Reeys DO  [facility] is a 73 year old, presenting for the following health issues:  ER F/U        6/15/2023     9:14 AM   Additional Questions   Roomed by Michel ALY CMA     HPI       Labs requested - B12      ED/UC Followup:    Facility:  [facility] ED  Date of visit: 06/13/2023  Reason for visit: Cellulitis of lower extremity, unspecified laterality;  Peripheral edema;  Renal insufficiency   Current Status: Patient had a side effect with Antibiotic that was give (numb lips and mouth, very itchy).      Her PCP in [place] had placed her on an extended course of Bactrim for treatment of a urinary tract infection. She was then changed to clindamycin in ED for cellulitis. Afterwards,  Started having itchy and numb lips last night. No fevers or chills.         Review of Systems   Constitutional, HEENT, cardiovascular, pulmonary, gi and gu systems are negative, except as otherwise noted.      Objective    /78 (BP Location: Right arm, Patient Position: Sitting, Cuff Size: Adult Regular)   Pulse 77   Temp 97.4  F (36.3  C) (Tympanic)   " Resp 14   Wt 80.7 kg (178 lb)   LMP 06/01/1988 (Approximate)   SpO2 98%   BMI 27.47 kg/m    Body mass index is 27.47 kg/m .  Physical Exam   GENERAL: healthy, alert and no distress  RESP: lungs clear to auscultation - no rales, rhonchi or wheezes  CV: regular rates and rhythm, normal S1 S2, no S3 or S4 and no murmur, click or rub  SKIN: erythematous patch on bilateral lower extremities; recently opened bulla on anterior lower left leg

## 2023-06-17 ENCOUNTER — HEALTH MAINTENANCE LETTER (OUTPATIENT)
Age: 73
End: 2023-06-17

## 2023-06-23 ENCOUNTER — OFFICE VISIT (OUTPATIENT)
Dept: FAMILY MEDICINE | Facility: CLINIC | Age: 73
End: 2023-06-23
Payer: MEDICARE

## 2023-06-23 VITALS
HEART RATE: 99 BPM | WEIGHT: 178 LBS | SYSTOLIC BLOOD PRESSURE: 120 MMHG | OXYGEN SATURATION: 96 % | DIASTOLIC BLOOD PRESSURE: 68 MMHG | TEMPERATURE: 98.5 F | RESPIRATION RATE: 16 BRPM | HEIGHT: 67 IN | BODY MASS INDEX: 27.94 KG/M2

## 2023-06-23 DIAGNOSIS — R94.4 DECREASED GFR: Primary | ICD-10-CM

## 2023-06-23 DIAGNOSIS — L03.119 CELLULITIS OF LOWER EXTREMITY, UNSPECIFIED LATERALITY: ICD-10-CM

## 2023-06-23 DIAGNOSIS — R79.82 CRP ELEVATED: ICD-10-CM

## 2023-06-23 LAB
ANION GAP SERPL CALCULATED.3IONS-SCNC: 15 MMOL/L (ref 7–15)
BUN SERPL-MCNC: 42.9 MG/DL (ref 8–23)
CALCIUM SERPL-MCNC: 9 MG/DL (ref 8.8–10.2)
CHLORIDE SERPL-SCNC: 100 MMOL/L (ref 98–107)
CREAT SERPL-MCNC: 1.5 MG/DL (ref 0.51–0.95)
CRP SERPL-MCNC: <3 MG/L
DEPRECATED HCO3 PLAS-SCNC: 26 MMOL/L (ref 22–29)
GFR SERPL CREATININE-BSD FRML MDRD: 36 ML/MIN/1.73M2
GLUCOSE SERPL-MCNC: 131 MG/DL (ref 70–99)
POTASSIUM SERPL-SCNC: 3.5 MMOL/L (ref 3.4–5.3)
SODIUM SERPL-SCNC: 141 MMOL/L (ref 136–145)

## 2023-06-23 PROCEDURE — 36415 COLL VENOUS BLD VENIPUNCTURE: CPT | Performed by: FAMILY MEDICINE

## 2023-06-23 PROCEDURE — 99213 OFFICE O/P EST LOW 20 MIN: CPT | Performed by: FAMILY MEDICINE

## 2023-06-23 PROCEDURE — 86140 C-REACTIVE PROTEIN: CPT | Performed by: FAMILY MEDICINE

## 2023-06-23 PROCEDURE — 80048 BASIC METABOLIC PNL TOTAL CA: CPT | Performed by: FAMILY MEDICINE

## 2023-06-23 NOTE — PROGRESS NOTES
"  Assessment & Plan     Decreased GFR  Recheck kidney function  - Basic metabolic panel  (Ca, Cl, CO2, Creat, Gluc, K, Na, BUN); Future  - Basic metabolic panel  (Ca, Cl, CO2, Creat, Gluc, K, Na, BUN)    CRP elevated  Recheck CRP  - CRP, inflammation; Future  - CRP, inflammation    Cellulitis of lower extremity, unspecified laterality  Resolved, no further follow up.               BMI:   Estimated body mass index is 28.3 kg/m  as calculated from the following:    Height as of this encounter: 1.689 m (5' 6.5\").    Weight as of this encounter: 80.7 kg (178 lb).       Eben MARTIN DO Eric  [facility] is a 73 year old, presenting for the following health issues:  RECHECK        6/23/2023     1:24 PM   Additional Questions   Roomed by Toshia HYMAN CMA     History of Present Illness       Reason for visit:  F/U wound care    She eats 2-3 servings of fruits and vegetables daily.She consumes 0 sweetened beverage(s) daily.She exercises with enough effort to increase her heart rate 9 or less minutes per day.  She exercises with enough effort to increase her heart rate 3 or less days per week.   She is taking medications regularly.     Doing well. Legs feeling well and wounds healed. No fevers, chills.      Review of Systems   Constitutional, HEENT, cardiovascular, pulmonary, gi and gu systems are negative, except as otherwise noted.      Objective    LMP 06/01/1988 (Approximate)   There is no height or weight on file to calculate BMI.  Physical Exam   GENERAL: healthy, alert and no distress  MS: no gross musculoskeletal defects noted, no edema  SKIN: no suspicious lesions or rashes                "

## 2023-07-31 ENCOUNTER — TRANSFERRED RECORDS (OUTPATIENT)
Dept: HEALTH INFORMATION MANAGEMENT | Facility: CLINIC | Age: 73
End: 2023-07-31

## 2023-11-04 ENCOUNTER — HEALTH MAINTENANCE LETTER (OUTPATIENT)
Age: 73
End: 2023-11-04

## 2023-11-22 DIAGNOSIS — Z13.220 LIPID SCREENING: ICD-10-CM

## 2023-11-22 DIAGNOSIS — Z94.4 LIVER REPLACED BY TRANSPLANT (H): Primary | ICD-10-CM

## 2024-01-13 ENCOUNTER — HEALTH MAINTENANCE LETTER (OUTPATIENT)
Age: 74
End: 2024-01-13

## 2024-06-01 ENCOUNTER — HEALTH MAINTENANCE LETTER (OUTPATIENT)
Age: 74
End: 2024-06-01

## 2024-06-21 DIAGNOSIS — Z94.4 LIVER REPLACED BY TRANSPLANT (H): Primary | ICD-10-CM

## 2024-06-21 DIAGNOSIS — I72.9 ANEURYSM (H): ICD-10-CM

## 2024-06-21 DIAGNOSIS — N18.30 TYPE 2 DIABETES MELLITUS WITH STAGE 3 CHRONIC KIDNEY DISEASE, WITHOUT LONG-TERM CURRENT USE OF INSULIN (H): ICD-10-CM

## 2024-06-21 DIAGNOSIS — E11.22 TYPE 2 DIABETES MELLITUS WITH STAGE 3 CHRONIC KIDNEY DISEASE, WITHOUT LONG-TERM CURRENT USE OF INSULIN (H): ICD-10-CM

## 2024-06-27 ENCOUNTER — TELEPHONE (OUTPATIENT)
Dept: TRANSPLANT | Facility: CLINIC | Age: 74
End: 2024-06-27
Payer: MEDICARE

## 2024-06-27 NOTE — TELEPHONE ENCOUNTER
"[facility]    Phone Message    May a detailed message be left on voicemail: no     Reason for Call: Appointment Intake    Patient was last seen by Dr. Vasquez on 08/16/2018, but has recently moved back to [place] and would like to continue care with the same provider.  Please advise scheduling team if we can schedule a \"return\" visit with the provider since she does not see \"new\" patients, and this patient is past the three year diamond for return visits.  Thank you!    Action Taken: Message routed to:  [facility] REJI PIERRE    Travel Screening: Not Applicable     Date of Service:                                                                     "

## 2024-06-27 NOTE — TELEPHONE ENCOUNTER
She will need to see a different provider. Pedro doesn't see for  Osteoporosis  and she needs a NEW appointment Ami Sultana RN on 6/27/2024 at 9:22 AM

## 2024-07-02 ENCOUNTER — TELEPHONE (OUTPATIENT)
Dept: TRANSPLANT | Facility: CLINIC | Age: 74
End: 2024-07-02
Payer: MEDICARE

## 2024-07-02 DIAGNOSIS — M25.569 KNEE PAIN: Primary | ICD-10-CM

## 2024-07-02 DIAGNOSIS — Z86.19 HISTORY OF SAN JOAQUIN VALLEY FEVER: ICD-10-CM

## 2024-07-02 NOTE — TELEPHONE ENCOUNTER
[facility] Call Center    Phone Message    May a detailed message be left on voicemail: yes     Reason for Call: Appointment Intake    Outbound call made to patient to schedule endocrinology.  Patient wants to schedule infectious disease as well, please place scheduling request.  If additional information is needed for the diagnosis codes or reason for the visit, please call patient.  Thank you!    Action Taken: Message routed to:  [facility] REJI PIERRE    Travel Screening: Not Applicable     Date of Service:

## 2024-07-05 DIAGNOSIS — N39.0 URINARY TRACT INFECTION: Primary | ICD-10-CM

## 2024-07-10 ENCOUNTER — TELEPHONE (OUTPATIENT)
Dept: NEUROSURGERY | Facility: CLINIC | Age: 74
End: 2024-07-10
Payer: MEDICARE

## 2024-07-10 NOTE — TELEPHONE ENCOUNTER
[facility] Call Center    Phone Message    May a detailed message be left on voicemail: yes     Reason for Call: Appointment Intake    Referring Provider Name:     Gentry Ramirez MD in  [facility]        Diagnosis and/or Symptoms: Aneurysm (H24) [I72.9]    Patient called and stated that she is trying to get an appt scheduled with Neurosurgery to be seen as a follow up on a brain aneurysm. Patient stated she spoke with someone a couple of weeks ago and they asked her to obtain imaging and have it sent over to our clinic. Patient is working on having those [place] over from out of state as patient just moved to [place]. Writer did not see any notes or encounters on who she talked to or if the referral was reviewed. Referral is a neurology referral, but per protocols should be neurosurgery and needs clinical review. Please review referral and call patient to schedule.     Action Taken: Message routed to:  [facility] Neurosurgery    Travel Screening: Not Applicable     Date of Service:

## 2024-07-10 NOTE — CONFIDENTIAL NOTE
DIAGNOSIS:  History of [place] fever    DATE RECEIVED:  07.29.2024     NOTES (Gather within 2 years) STATUS DETAILS   OFFICE NOTE from referring provider   Internal 07.02.2024 Gentry Ramirez MD    LABS (any labs) Internal    MEDICATION LIST Internal

## 2024-07-15 ENCOUNTER — OFFICE VISIT (OUTPATIENT)
Dept: ORTHOPEDICS | Facility: CLINIC | Age: 74
End: 2024-07-15
Attending: INTERNAL MEDICINE
Payer: MEDICARE

## 2024-07-15 ENCOUNTER — ANCILLARY PROCEDURE (OUTPATIENT)
Dept: GENERAL RADIOLOGY | Facility: CLINIC | Age: 74
End: 2024-07-15
Attending: STUDENT IN AN ORGANIZED HEALTH CARE EDUCATION/TRAINING PROGRAM
Payer: MEDICARE

## 2024-07-15 VITALS
DIASTOLIC BLOOD PRESSURE: 78 MMHG | WEIGHT: 176 LBS | SYSTOLIC BLOOD PRESSURE: 186 MMHG | HEIGHT: 68 IN | BODY MASS INDEX: 26.67 KG/M2

## 2024-07-15 DIAGNOSIS — M25.562 CHRONIC PAIN OF LEFT KNEE: ICD-10-CM

## 2024-07-15 DIAGNOSIS — M17.12 PRIMARY OSTEOARTHRITIS OF LEFT KNEE: ICD-10-CM

## 2024-07-15 DIAGNOSIS — G89.29 CHRONIC PAIN OF LEFT KNEE: Primary | ICD-10-CM

## 2024-07-15 DIAGNOSIS — G89.29 CHRONIC PAIN OF LEFT KNEE: ICD-10-CM

## 2024-07-15 DIAGNOSIS — M25.562 CHRONIC PAIN OF LEFT KNEE: Primary | ICD-10-CM

## 2024-07-15 PROCEDURE — 73560 X-RAY EXAM OF KNEE 1 OR 2: CPT | Mod: TC | Performed by: RADIOLOGY

## 2024-07-15 PROCEDURE — 99213 OFFICE O/P EST LOW 20 MIN: CPT | Performed by: STUDENT IN AN ORGANIZED HEALTH CARE EDUCATION/TRAINING PROGRAM

## 2024-07-15 PROCEDURE — 73562 X-RAY EXAM OF KNEE 3: CPT | Mod: TC | Performed by: RADIOLOGY

## 2024-07-15 NOTE — LETTER
7/15/2024      Luz Thompson  [address]  [facility] [address]      Dear Colleague,    Thank you for referring your patient, Luz Thompson, to the [facility] [place]. Please see a copy of my visit note below.    ASSESSMENT & PLAN    [place]ia was seen today for pain.    Diagnoses and all orders for this visit:    Chronic pain of left knee  -     XR Knee Standing AP [place] Bilat Lat Left; Future  -     Physical Therapy  Referral; Future    Primary osteoarthritis of left knee  -     Orthopedic  Referral  -     Physical Therapy  Referral; Future      This issue is chronic and Unchanged. Virginia presents to clinic today to discuss her chronic bilateral knee pain.  History, exam findings, and imaging findings are consistent with her radiographically moderate bilateral knee osteoarthritis is the main  of her symptoms.  We discussed these findings and the treatment options including anti-inflammatory medicines, physical therapy, corticosteroid injections, hyaluronic acid injections, and surgical intervention in the form of a knee replacement.  She has had previous injections with both steroid and hyaluronic acid, with mild improvement with the steroid injection and no improvement with the gel injection.  She does use a topical THC cream that she purchased in [place] it does provide some relief.  She has not done physical therapy.  Currently, her pain is not bothering her enough to the point where she wishes to proceed with further injections, but would be interested in continuing with conservative management avoiding surgical intervention.  We determined the following plan:  - Physical therapy referral placed today  - Recommend trial of Voltaren gel 2-3 times a day for pain relief  - She can follow-up in our clinic as needed for a CSI to the knee.      Curly Smart,   [facility]  "[place]    -----  Chief Complaint   Patient presents with     Left Knee - Pain       SUBJECTIVE  Luz Thompson is a/an 74 year old female who is seen in consultation at the request of  Gentry Ramirez M.D. for evaluation of left knee pain.     The patient is seen by themselves.    Onset: Years with pain worsening since December 2023. Reports insidious onset without acute precipitating event.  Location of Pain: left anterior knee; she notes stabbing pain radiating down left anterior leg in December 2023  Worsened by: prolonged standing/activity   Better with: THC cream  Treatments tried: rest/activity avoidance, Tylenol, worked with [place]/ in [place], previous imaging (xray in [place]), corticosteroid injection (most recent date: 12/2023) that provided  1 week(s) of relief, viscosupplementation injection bilateral knee (most recent date: 02/2024) which provided no relief in left knee, THC topical cream  Associated symptoms: stiffness, feeling of instability, swelling (in entire leg)    Orthopedic/Surgical history: YES - immunocompromised - has been told in the past that she is not a great surgical candidate  Social History/Occupation: retired      REVIEW OF SYSTEMS:  Review of systems negative unless mentioned in HPI     OBJECTIVE:  BP (!) 186/78   Ht 1.715 m (5' 7.5\")   Wt 79.8 kg (176 lb)   LMP 06/01/1988 (Approximate)   BMI 27.16 kg/m     General: healthy, alert and in no distress  Skin: no suspicious lesions or rash.  CV: distal perfusion intact   Resp: normal respiratory effort without conversational dyspnea   Psych: normal mood and affect  Gait: wheelchair  Neuro: Normal light sensory exam of bilateral lower extremity     B/l Knee exam  Gait: Normal  Alignment:  [x] Normal  [] Anatomic valgus  [] Anatomic varus  Inspection: [x] Normal  [] Ecchymosis present []Other   Palpation:  Joint Tenderness: [] No Tenderness  [x] MJL [] LJL [] MCL Margin [] LCL Margin [] Pes Anserine [] Distal Quadricep  [] " Other   Peripatellar Tenderness: [] None [x] Lateral pole [x] Medial pole [x] Superior pole [] Inferior pole    Patellar tendon pain: [x] None [] Present    Patellar apprehension: [x] None [] Present    Patellar motion: [x] Normal [] Abnormal  Crepitus: [x] None [] Present  Effusion: [x] None [] Trace [] 1+ [] 2+ [] 3+  Range of motion:  Flexion: 135, Extension: 0  Strength:  [x] Full in all planes, including intact extensor mechanism [] Limited as described  Neurologic: Normal sensation   Vascular: Normal pulses   Special tests:   Lachman: Negative  Anterior Drawer: Negative  Sag/quad Activation: NP  Posterior Drawer: Negative  Valgus Stress: Negative at 0/30  Varus Stress: Negative at 0/30  Kory's: painful  Thessaly: NP     Other notable findings/comments: None       RADIOLOGY:  Final results and radiologist's interpretation, available in the [facility] health record.  Images were reviewed with the patient in the office today.  My personal interpretation of the performed imaging: Mild to moderate joint space narrowing osteophytosis of the left knee.  Moderate joint space narrowing and osteophytosis of the right knee.  Acute bony abnormalities.         Again, thank you for allowing me to participate in the care of your patient.        Sincerely,        Curly Smart DO

## 2024-07-15 NOTE — PROGRESS NOTES
ASSESSMENT & PLAN    Virginia was seen today for pain.    Diagnoses and all orders for this visit:    Chronic pain of left knee  -     XR Knee Standing AP [place] Bilat Lat Left; Future  -     Physical Therapy  Referral; Future    Primary osteoarthritis of left knee  -     Orthopedic  Referral  -     Physical Therapy  Referral; Future      This issue is chronic and Unchanged. Virginia presents to clinic today to discuss her chronic bilateral knee pain.  History, exam findings, and imaging findings are consistent with her radiographically moderate bilateral knee osteoarthritis is the main  of her symptoms.  We discussed these findings and the treatment options including anti-inflammatory medicines, physical therapy, corticosteroid injections, hyaluronic acid injections, and surgical intervention in the form of a knee replacement.  She has had previous injections with both steroid and hyaluronic acid, with mild improvement with the steroid injection and no improvement with the gel injection.  She does use a topical THC cream that she purchased in [place] it does provide some relief.  She has not done physical therapy.  Currently, her pain is not bothering her enough to the point where she wishes to proceed with further injections, but would be interested in continuing with conservative management avoiding surgical intervention.  We determined the following plan:  - Physical therapy referral placed today  - Recommend trial of Voltaren gel 2-3 times a day for pain relief  - She can follow-up in our clinic as needed for a CSI to the knee.      Curly Smart, [facility] [place]    -----  Chief Complaint   Patient presents with    Left Knee - Pain       SUBJECTIVE  Luz Thompson is a/an 74 year old female who is seen in consultation at the request of  Gentry Ramirez M.D. for evaluation of left knee pain.     The patient is seen by  "themselves.    Onset: Years with pain worsening since December 2023. Reports insidious onset without acute precipitating event.  Location of Pain: left anterior knee; she notes stabbing pain radiating down left anterior leg in December 2023  Worsened by: prolonged standing/activity   Better with: THC cream  Treatments tried: rest/activity avoidance, Tylenol, worked [place]/ in [place], previous imaging (xray in [place]), corticosteroid injection (most recent date: 12/2023) that provided  1 week(s) of relief, viscosupplementation injection bilateral knee (most recent date: 02/2024) which provided no relief in left knee, THC topical cream  Associated symptoms: stiffness, feeling of instability, swelling (in entire leg)    Orthopedic/Surgical history: YES - immunocompromised - has been told in the past that she is not a great surgical candidate  Social History/Occupation: retired      REVIEW OF SYSTEMS:  Review of systems negative unless mentioned in HPI     OBJECTIVE:  BP (!) 186/78   Ht 1.715 m (5' 7.5\")   Wt 79.8 kg (176 lb)   LMP 06/01/1988 (Approximate)   BMI 27.16 kg/m     General: healthy, alert and in no distress  Skin: no suspicious lesions or rash.  CV: distal perfusion intact   Resp: normal respiratory effort without conversational dyspnea   Psych: normal mood and affect  Gait: wheelchair  Neuro: Normal light sensory exam of bilateral lower extremity     B/l Knee exam  Gait: Normal  Alignment:  [x] Normal  [] Anatomic valgus  [] Anatomic varus  Inspection: [x] Normal  [] Ecchymosis present []Other   Palpation:  Joint Tenderness: [] No Tenderness  [x] MJL [] LJL [] MCL Margin [] LCL Margin [] Pes Anserine [] Distal Quadricep  [] Other   Peripatellar Tenderness: [] None [x] Lateral pole [x] Medial pole [x] Superior pole [] Inferior pole    Patellar tendon pain: [x] None [] Present    Patellar apprehension: [x] None [] Present    Patellar motion: [x] Normal [] Abnormal  Crepitus: [x] None [] " Present  Effusion: [x] None [] Trace [] 1+ [] 2+ [] 3+  Range of motion:  Flexion: 135, Extension: 0  Strength:  [x] Full in all planes, including intact extensor mechanism [] Limited as described  Neurologic: Normal sensation   Vascular: Normal pulses   Special tests:   Lachman: Negative  Anterior Drawer: Negative  Sag/quad Activation: NP  Posterior Drawer: Negative  Valgus Stress: Negative at 0/30  Varus Stress: Negative at 0/30  Kory's: painful  Thessaly: NP     Other notable findings/comments: None       RADIOLOGY:  Final results and radiologist's interpretation, available in the [facility] health record.  Images were reviewed with the patient in the office today.  My personal interpretation of the performed imaging: Mild to moderate joint space narrowing osteophytosis of the left knee.  Moderate joint space narrowing and osteophytosis of the right knee.  Acute bony abnormalities.

## 2024-07-17 ENCOUNTER — TELEPHONE (OUTPATIENT)
Dept: NEUROSURGERY | Facility: CLINIC | Age: 74
End: 2024-07-17
Payer: MEDICARE

## 2024-07-17 NOTE — TELEPHONE ENCOUNTER
Left Voicemail (1st Attempt) for the patient to call back and schedule the following:    Appointment type: New vascular neurosurg - in person or virtual  Provider: Tereza Richards  Return date: Next available  Specialty phone number: [phone]  Additional appointment(s) needed: N/A  Additonal Notes: Cerebral aneurysm/ Epic/Pacs

## 2024-07-18 NOTE — TELEPHONE ENCOUNTER
RECORDS RECEIVED FROM: Care Everywhere   REASON FOR VISIT: Cerebral aneurysm   PROVIDER: Tereza Richards APRN CNP   DATE OF APPT: 7/31/24 @ 11:00 am    NOTES (FOR ALL VISITS) STATUS DETAILS   OFFICE NOTE from referring provider Care Everywhere 6/20/24 (MyChart Note)    DISCHARGE SUMMARY from hospital Care Everywhere 6/18/24-6/22/24 Renny Hewitt,  @[facility]     MEDICATION LIST Internal    IMAGING  (FOR ALL VISITS)     MRI (HEAD, NECK, SPINE) Received [facility]  1/15/23 MR Brain     CT (HEAD, NECK, SPINE) Received [facility]  11/7/23 CT Head  5/3/23 CT Head  1/15/23 CT Head  1/15/23 CTA Head & Neck

## 2024-07-20 NOTE — MR AVS SNAPSHOT
After Visit Summary   5/2/2017    Luz Thompson    MRN: [id number]           Patient Information     Date Of Birth          1950        Visit Information        Provider Department      5/2/2017 12:00 PM Randell Mejia MD [facility]        Today's Diagnoses     Bilateral impacted cerumen    -  1    SNHL (sensorineural hearing loss)          Care Instructions    Scheduling Information  To schedule your CT/MRI scan, please contact [facility] at [phone] OR [facility] at [phone]    To schedule your Surgery, please contact our Specialty Schedulers at [phone]      ENT Clinic Locations Clinic Hours Telephone Number     [facility]  [address]  [place] [address]   Monday:           1:00pm -- 5:00pm    Friday:              8:00am - 12:00pm   To schedule/reschedule an appointment with   Dr. Mejia,   please contact our   Specialty Scheduling Department at:     [phone]       [facility]  [address] [address] Tuesday:          8:00am -- 2:00pm         Urgent Care Locations Clinic Hours Telephone Numbers     [facility]  [address] [place] [address]     Monday-Friday:     11:00am - 9:00pm    Saturday-Sunday:  9:00am - 5:00pm   [phone]     [facility]  [address] [place] [address]. [place] [address]     Monday-Friday:      5:00pm - 9:00pm     Saturday-Sunday:  9:00am - 5:00pm   [phone]               Follow-ups after your visit        Your next 10 appointments already scheduled     May 02, 2017 12:00 PM CDT   Return Visit with Randell Mejia MD   [facility] ([facility])    [address] [id number] [phone]            May 08, 2017  4:40 PM CDT   (Arrive by 4:25 PM)   ATRIAL FIBULATION VISIT with Graham Gilmore MD   [facility] ([facility])    [address] [place] [address]  [address] [address]   [phone]            May 15, 2017 11:00 AM CDT   (Arrive by 10:30 AM)   RETURN ENDOCRINE with Rach Vasquez MD   [facility] ([facility])    [address] [place] [address]   [phone]            May 23, 2017 12:00 PM CDT   (Arrive by 11:45 AM)   Return Visit with Mavis Bermudez MD   [facility] ([facility])    [address] [place] [address]   [phone]            May 24, 2017 10:00 AM CDT   (Arrive by 9:30 AM)   Return Visit with Crystal Montero MD   [facility] ([facility])    [address] [place] [address]   [phone]            Jul 31, 2017 10:00 AM CDT   (Arrive by 9:45 AM)   CT CHEST W/O CONTRAST with UCCT1   [facility] [place] [facility] ([facility])    [address] [place] [address] [phone]           Please bring any scans or X-rays taken at other hospitals, if similar tests were done. Also bring a list of your medicines, including vitamins, minerals and over-the-counter drugs. It is safest to leave personal items at home.  Be sure to tell your doctor:   If you have any allergies.   If there s any chance you are pregnant.   If you are breastfeeding.   If you have any special needs.  You do not need to do anything special to prepare.  Please wear loose clothing, such as a sweat suit or jogging clothes. Avoid snaps, zippers and other metal. We may ask you to undress and put on a hospital gown.            Jul 31, 2017 10:30 AM CDT   (Arrive by 10:15 AM)   Return Visit with Eden Clinton MD   [facility] ([facility])    [address] [place] [address]   [phone]  "           Sep 19, 2017 11:15 AM CDT   (Arrive by 11:00 AM)   Return General Liver with Gentry Ramirez MD   [facility] ([facility])    [address] [address] [phone]              Who to contact     If you have questions or need follow up information about today's clinic visit or your schedule please contact [facility] [place] directly at [phone].  Normal or non-critical lab and imaging results will be communicated to you by [employer], letter or phone within 4 business days after the clinic has received the results. If you do not hear from us within 7 days, please contact the clinic through [employer] or phone. If you have a critical or abnormal lab result, we will notify you by phone as soon as possible.  Submit refill requests through [employer] or call your pharmacy and they will forward the refill request to us. Please allow 3 business days for your refill to be completed.          Additional Information About Your Visit        [employer] Information     [employer] gives you secure access to your electronic health record. If you see a primary care provider, you can also send messages to your care team and make appointments. If you have questions, please call your primary care clinic.  If you do not have a primary care provider, please call [phone] and they will assist you.        Care EveryWhere ID     This is your Care EveryWhere ID. This could be used by other organizations to access your [facility] medical records  [id number]        Your Vitals Were     Respirations Height BMI (Body Mass Index)             12 1.702 m (5' 7\") 30.54 kg/m2          Blood Pressure from Last 3 Encounters:   04/24/17 154/84   04/19/17 160/83   09/21/16 161/77    Weight from Last 3 Encounters:   05/02/17 88.5 kg (195 lb)   04/24/17 88.9 kg (196 lb)   04/19/17 89 kg (196 lb 3.2 oz)              We Performed the Following     Remove Cerumen        " Primary Care Provider Office Phone # Fax #    Jennifer Amparo Barraza [phone]       [facility] [address]  [facility] [address]        Thank you!     Thank you for choosing [facility]  for your care. Our goal is always to provide you with excellent care. Hearing back from our patients is one way we can continue to improve our services. Please take a few minutes to complete the written survey that you may receive in the mail after your visit with us. Thank you!             Your Updated Medication List - Protect others around you: Learn how to safely use, store and throw away your medicines at www.disposemymeds.org.          This list is accurate as of: 5/2/17 11:56 AM.  Always use your most recent med list.                   Brand Name Dispense Instructions for use    acetaminophen 500 MG Caps      Take 500 mg by mouth as needed Take 500-1,000 mg by mouth every 6 hours if needed.       albuterol 108 (90 BASE) MCG/ACT Inhaler   Generic drug:  albuterol      Inhale 2 puffs into the lungs every 4 hours as needed for shortness of breath / dyspnea or wheezing Reported on 4/25/2017       ARTIFICIAL TEARS OP      Apply 1 drop to eye as needed       * aspirin 81 MG tablet      Take 81 mg by mouth       * aspirin 81 MG EC tablet      Take 1 tablet by mouth daily.       BENEFIBER Powd      2 teaspoons daily       BREO ELLIPTA 100-25 MCG/INH oral inhaler   Generic drug:  fluticasone-vilanterol      Inhale 1 puff into the lungs daily Reported on 4/25/2017       calcitRIOL 0.25 MCG capsule    ROCALTROL    180 capsule    Take 2 tablets daily       clotrimazole 1 % cream    LOTRIMIN     Apply topically 2 times daily Reported on 4/25/2017       folic acid 1 MG tablet    FOLVITE    90 tablet    Take 1 tablet (1 mg) by mouth daily       furosemide 20 MG tablet    LASIX    90 tablet    Take 1 tablet (20 mg) by mouth every other day       levothyroxine 150 MCG tablet     SYNTHROID/LEVOTHROID    96 tablet    Take one tablet daily 6 days a week and one and a half tablets one day a week.       losartan 25 MG tablet    COZAAR    90 tablet    Take 1 tablet (25 mg) by mouth daily       magnesium 250 MG tablet      Take 2 tablets by mouth daily At night.       meclizine 25 MG tablet    ANTIVERT    30 tablet    Take 1 tablet (25 mg) by mouth as needed       predniSONE 5 MG tablet    DELTASONE    90 tablet    Take 1 tablet (5 mg) by mouth daily       PRESERVISION AREDS 2 Caps      Take 2 capsules by mouth daily       sirolimus Tabs tablet     15 tablet    Take 0.5 mg by mouth every other day       STATIN NOT PRESCRIBED (INTENTIONAL)     0 each    Statin not prescribed intentionally due to Intolerance (with supporting documentation of trying a statin at least once within the last 5 years)       SUMAtriptan 50 MG tablet    IMITREX    30 tablet    Take 1 tablet (50 mg) by mouth at onset of headache for migraine repeat after 2 hours if needed.       * triamcinolone 0.1 % cream    KENALOG    80 g    Apply topically 2 times daily Apply to rash       * triamcinolone 0.1 % cream    KENALOG    80 g    Apply cream twice daily to foot       ursodiol 250 MG tablet    ACTIGALL    180 tablet    Take 1 tablet (250 mg) by mouth 2 times daily       voriconazole 200 MG tablet    VFEND     Take 1 tablet (200 mg) by mouth 2 times daily       * Notice:  This list has 4 medication(s) that are the same as other medications prescribed for you. Read the directions carefully, and ask your doctor or other care provider to review them with you.       hide

## 2024-07-29 ENCOUNTER — PRE VISIT (OUTPATIENT)
Dept: INFECTIOUS DISEASES | Facility: CLINIC | Age: 74
End: 2024-07-29
Payer: MEDICARE

## 2024-07-31 ENCOUNTER — PRE VISIT (OUTPATIENT)
Dept: NEUROSURGERY | Facility: CLINIC | Age: 74
End: 2024-07-31

## 2024-08-07 ENCOUNTER — TELEPHONE (OUTPATIENT)
Dept: NEUROSURGERY | Facility: CLINIC | Age: 74
End: 2024-08-07
Payer: MEDICARE

## 2024-08-07 NOTE — TELEPHONE ENCOUNTER
Called patient to move appointment w/ Tereza Maurice up, but patient needs to check with daughter to see if the date and time work. Daughter is her ride. Via Referral message. -KB

## 2024-08-07 NOTE — TELEPHONE ENCOUNTER
[facility] Call Center    Phone Message    May a detailed message be left on voicemail: yes     Reason for Call: Other: [place] calling stating that Sept 13th her daughter will be out of town.  Please call her back to advise on any other dates that might work.       Action Taken: Message routed to:  [facility] ([facility] Neurosurgery     Travel Screening: Not Applicable     Date of Service:

## 2024-08-08 ENCOUNTER — TELEPHONE (OUTPATIENT)
Dept: NEUROSURGERY | Facility: CLINIC | Age: 74
End: 2024-08-08
Payer: MEDICARE

## 2024-08-09 ENCOUNTER — TELEPHONE (OUTPATIENT)
Dept: NEUROSURGERY | Facility: CLINIC | Age: 74
End: 2024-08-09
Payer: MEDICARE

## 2024-08-09 NOTE — TELEPHONE ENCOUNTER
Called patient and confirmed appointment w/ Tereza Richards in Sept 2024, via patient call list. -KB

## 2024-08-09 NOTE — TELEPHONE ENCOUNTER
Pt called.  She wants to let June know that she wants to keep her appointment that she originally had on 9/24.  She said that the alternative date will not work.  Please call Pt back to confirm.  Thanks.

## 2024-08-10 ENCOUNTER — HEALTH MAINTENANCE LETTER (OUTPATIENT)
Age: 74
End: 2024-08-10

## 2024-08-12 ENCOUNTER — TELEPHONE (OUTPATIENT)
Dept: ORTHOPEDICS | Facility: CLINIC | Age: 74
End: 2024-08-12
Payer: MEDICARE

## 2024-08-12 NOTE — TELEPHONE ENCOUNTER
Called and spoke with patient.  Patient states orders were faxed a few weeks ago but her building lost the orders and is requesting they be resent.  Writer verbalized understanding and informed patient we will fax them right away today.  She verbalized understanding and was appreciative of call back.    PT orders faxed to number listed below.    Padmini Wyatt MBA, ATC

## 2024-08-12 NOTE — TELEPHONE ENCOUNTER
Please FAX pt's PT order to:    [phone]    ATTN: [facility] (PT)    Per pt request. Thank you.

## 2024-08-14 DIAGNOSIS — N30.20 BLADDER INFECTION, CHRONIC: Primary | ICD-10-CM

## 2024-08-14 DIAGNOSIS — Z94.4 LIVER REPLACED BY TRANSPLANT (H): ICD-10-CM

## 2024-08-14 RX ORDER — GABAPENTIN 250 MG/5ML
300 SOLUTION ORAL AT BEDTIME
Qty: 180 ML | Refills: 0 | Status: SHIPPED | OUTPATIENT
Start: 2024-08-14

## 2024-08-20 ENCOUNTER — OFFICE VISIT (OUTPATIENT)
Dept: UROLOGY | Facility: CLINIC | Age: 74
End: 2024-08-20
Attending: INTERNAL MEDICINE
Payer: MEDICARE

## 2024-08-20 ENCOUNTER — MEDICAL CORRESPONDENCE (OUTPATIENT)
Dept: HEALTH INFORMATION MANAGEMENT | Facility: CLINIC | Age: 74
End: 2024-08-20

## 2024-08-20 VITALS — DIASTOLIC BLOOD PRESSURE: 75 MMHG | SYSTOLIC BLOOD PRESSURE: 122 MMHG | OXYGEN SATURATION: 99 % | HEART RATE: 70 BPM

## 2024-08-20 DIAGNOSIS — R30.0 DYSURIA: Primary | ICD-10-CM

## 2024-08-20 DIAGNOSIS — Z87.440 HISTORY OF UTI: ICD-10-CM

## 2024-08-20 LAB
ALBUMIN UR-MCNC: 100 MG/DL
APPEARANCE UR: CLEAR
BILIRUB UR QL STRIP: NEGATIVE
COLOR UR AUTO: YELLOW
GLUCOSE UR STRIP-MCNC: 100 MG/DL
HGB UR QL STRIP: ABNORMAL
KETONES UR STRIP-MCNC: NEGATIVE MG/DL
LEUKOCYTE ESTERASE UR QL STRIP: ABNORMAL
NITRATE UR QL: POSITIVE
PH UR STRIP: 5.5 [PH] (ref 5–7)
SP GR UR STRIP: 1.02 (ref 1–1.03)
UROBILINOGEN UR STRIP-ACNC: 0.2 E.U./DL

## 2024-08-20 PROCEDURE — 87086 URINE CULTURE/COLONY COUNT: CPT | Performed by: PHYSICIAN ASSISTANT

## 2024-08-20 PROCEDURE — 87088 URINE BACTERIA CULTURE: CPT | Performed by: PHYSICIAN ASSISTANT

## 2024-08-20 PROCEDURE — 99204 OFFICE O/P NEW MOD 45 MIN: CPT | Mod: 25 | Performed by: PHYSICIAN ASSISTANT

## 2024-08-20 PROCEDURE — 87186 SC STD MICRODIL/AGAR DIL: CPT | Performed by: PHYSICIAN ASSISTANT

## 2024-08-20 PROCEDURE — 51798 US URINE CAPACITY MEASURE: CPT | Performed by: PHYSICIAN ASSISTANT

## 2024-08-20 PROCEDURE — 81003 URINALYSIS AUTO W/O SCOPE: CPT | Mod: QW | Performed by: PHYSICIAN ASSISTANT

## 2024-08-20 RX ORDER — FERROUS SULFATE 324(65)MG
1 TABLET, DELAYED RELEASE (ENTERIC COATED) ORAL 2 TIMES DAILY
COMMUNITY

## 2024-08-20 RX ORDER — SULFAMETHOXAZOLE AND TRIMETHOPRIM 400; 80 MG/1; MG/1
1 TABLET ORAL 2 TIMES DAILY
Qty: 14 TABLET | Refills: 0 | Status: SHIPPED | OUTPATIENT
Start: 2024-08-20 | End: 2024-09-22

## 2024-08-20 RX ORDER — ESTRADIOL 0.1 MG/G
CREAM VAGINAL
Qty: 42.5 G | Refills: 3 | Status: CANCELLED | OUTPATIENT
Start: 2024-08-20

## 2024-08-20 RX ORDER — ECHINACEA PURPUREA EXTRACT 125 MG
2 TABLET ORAL
Status: ON HOLD | COMMUNITY
Start: 2024-06-19 | End: 2024-09-22

## 2024-08-20 NOTE — PROGRESS NOTES
CC: Recurrent UTIs    HPI: It was my pleasure to meet Ms. Luz Thompson, a 74 year old year old female seen in consultation today at the request of Dr. Ramirez for recurrent UTIs. She is immunosuppressed (s/p liver tx).  These are typically symptomatic.  Typical symptoms include:  Frequency, urgency, burning, cloudy/odor, pain post urination, vaginal pain, bladder spasms. She denies gross hematuria, flank pain, fevers, chills.  She has no history of stones.  She has been hospitalized for UTIs (Last in [place] this year, tx with meropenem and eventual Bactrim, UC x 2, one no growth, 50-100k Kleb).       With infection, Ms. Luz Thompson typically goes to  (or [place]) where cultures are sometimes performed (see EMR). She was also doing self-treat in the past with Macrobid.  Is usually given an antibiotic and reports that symptoms do resolve appropriately with therapy for a short time. Therapies that have been tried include: Sherine, Erta, Bactrim, Vantin.     -Inciting events include: Patient does not hold urine beyond the urge to void  -Daily Fluid intake: water with minimal caffeine.    -Bowels: Reg    She has seen Dr. Waddell in the past (2019) and was recommended to follow-up in 3 months and consider cysto if not improved.     Cr 1.4 on 6/22/24. Renal US in 2023 neg for hydro.     No longer on vaginal estrogen pellet (became allergic to the gelatin) or cranberry tablet (uses d-mannose in cran juice). Has 29 allergies making it difficult to treat UTIs at times.     Has appt with ID tomorrow. Urology in [place] put her on daily Macrobid and ID in [place] took her off.   No longer taking anti-fungal.    Recalls a normal cysto and PVR in [place] this year. Has not had a recent CT.   Does well with Fosfomycin, Macrobid and Bactrim. ID in [place] had suggested Fosfomycin once a week as prophy, although she declined this at that time.     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     Diverticulosis of sigmoid colon 12/21/2013    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     Received Rituxan during Summer of 2016    H/O esophageal varices     Hearing loss     Heart murmur 4/4/2012    History of DVT (deep vein thrombosis)     History of [facility] fever     History of thyroid cancer 9/25/2012    Hyperlipidemia 4/10/2012    Says that she does not have it anymore, not on meds    Hyperlipidemia LDL goal <70     Hypertension goal BP (blood pressure) < 140/80 11/06/2013    Hypertriglyceridemia     Liver replaced by transplant (H) 10/17/2011    Dr. 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       Past Surgical History:   Procedure Laterality Date    APPENDECTOMY  1961    BIOPSY      CATARACT IOL, RT/LT      RE12/19/2013, LE12/10/2013 - Toric lenses    CHOLECYSTECTOMY  1991    COLONOSCOPY  3/10/2014    Procedure: COLONOSCOPY;;  Surgeon: Gentry Ramirez MD;  Location:  GI    ear drum repair      ENDOBRONCHIAL ULTRASOUND FLEXIBLE N/A 9/29/2017    Procedure: ENDOBRONCHIAL ULTRASOUND FLEXIBLE;  Flexible Bronchoscopy, Endobronchial Ultrasound, Transbronchial Needle Aspiration ;  Surgeon: Eden Clinton MD;  Location:  OR    ENDOSCOPIC RETROGRADE " "CHOLANGIOPANCREATOGRAM  2013    Procedure: ENDOSCOPIC RETROGRADE CHOLANGIOPANCREATOGRAM;  Endoscopic Retrograde Cholangiopancreatogram with single balloon enteroscopy, ballon sweep of bile duct;  Surgeon: Brett Membreno MD;  Location: [facility] OR    HC KNEE SCOPE,MED/LAT MENISECTOMY  8/10/12    Right, partial medial menisectomy only    KNEE SURGERY  1966    R knee    PICC INSERTION  2013    4fr SL PASV PICC, 40cm (1cm external) in the R basilic vein w/ tip in the low SVC    PICC INSERTION  2014    5 fr DL BioFlo Navilyst PICC, 46 cm (3 cm external) in the L basilic vein w/ tip in the SVC RA junction.    THYROIDECTOMY  3/2010    TRANSPLANT LIVER RECIPIENT LIVING UNRELATED         FAMILY HISTORY: Denies family history of urologic cancer.     Social History     Socioeconomic History    Marital status:      Spouse name: Robbin Thompson    Number of children: 4    Years of education: 20    Highest education level: Not on file   Occupational History    Occupation: Guardian Conservator      Employer: [employer]     Comment: social work     Employer: SELF   Tobacco Use    Smoking status: Former     Current packs/day: 0.00     Average packs/day: 1 pack/day for 18.0 years (18.0 ttl pk-yrs)     Types: Cigarettes     Start date: 1967     Quit date: 1985     Years since quittin.3    Smokeless tobacco: Never   Vaping Use    Vaping status: Never Used   Substance and Sexual Activity    Alcohol use: Yes     Alcohol/week: 0.0 standard drinks of alcohol     Comment: rare - \"I toast at weddings\"    Drug use: No    Sexual activity: Yes     Partners: Male     Birth control/protection: Post-menopausal   Other Topics Concern    Parent/sibling w/ CABG, MI or angioplasty before 65F 55M? Not Asked     Service Not Asked    Blood Transfusions Not Asked    Caffeine Concern Not Asked    Occupational Exposure Not Asked    Hobby Hazards Not Asked    Sleep Concern Not Asked    Stress " Concern Not Asked    Weight Concern Not Asked    Special Diet Not Asked    Back Care Not Asked    Exercise Yes     Comment: cardio and strengthing    Bike Helmet Not Asked    Seat Belt Not Asked    Self-Exams Not Asked   Social History Narrative    She is retired. [place]. 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     Social Determinants of Health     Financial Resource Strain: Low Risk  (11/9/2023)    Received from [facility]    Overall Financial Resource Strain (CARDIA)     Difficulty of Paying Living Expenses: Not hard at all   Food Insecurity: No Food Insecurity (6/21/2024)    Received from [facility]    Hunger Vital Sign     Worried About Running Out of Food in the Last Year: Never true     Ran Out of Food in the Last Year: Never true   Transportation Needs: No Transportation Needs (6/21/2024)    Received from [facility]    PRAPARE - Transportation     Lack of Transportation (Medical): No     Lack of Transportation (Non-Medical): No   Physical Activity: Insufficiently Active (11/9/2023)    Received from [facility]    Exercise Vital Sign     Days of Exercise per Week: 5 days     Minutes of Exercise per Session: 10 min   Stress: No Stress Concern Present (11/9/2023)    Received from [facility]    [employer] [place] [employer] - Occupational Stress Questionnaire     Feeling of Stress : Only a little   Social Connections: Feeling Socially Integrated (12/20/2023)    Received from [facility]    OASIS : Social Isolation     Frequency of experiencing loneliness or isolation: Never   Interpersonal Safety: Not At Risk (6/21/2024)    Received from [facility]    Humiliation, Afraid, Rape, and Kick questionnaire     Fear of Current or Ex-Partner: No     Emotionally Abused: No     Physically Abused: No     Sexually Abused: No   Housing Stability: Low Risk  (6/21/2024)    Received from  [facility]    Housing Stability Vital Sign     Unable to Pay for Housing in the Last Year: No     Number of Times Moved in the Last Year: 1     Homeless in the Last Year: No      reports that she quit smoking about 39 years ago. Her smoking use included cigarettes. She started smoking about 57 years ago. She has a 18 pack-year smoking history. She has never used smokeless tobacco.    Current Outpatient Medications   Medication Sig Dispense Refill    acetaminophen (TYLENOL) 325 MG tablet Take 2 tablets (650 mg) by mouth every 6 hours as needed for mild pain or fever      apixaban ANTICOAGULANT (ELIQUIS ANTICOAGULANT) 2.5 MG tablet Take 1 tablet (2.5 mg) by mouth 2 times daily 180 tablet 3    azelastine (ASTELIN) 0.1 % nasal spray azelastine 137 mcg (0.1 %) nasal spray aerosol   Spray 1 spray every day by nasal route for 90 days.      calcitRIOL (ROCALTROL) 0.5 MCG capsule Take 1 capsule (0.5 mcg) by mouth daily 90 capsule 3    estradiol (ESTRACE) 0.1 MG/GM vaginal cream Place 2 g vaginally twice a week      evolocumab (REPATHA SURECLICK) 140 MG/ML prefilled autoinjector Inject 1 mL (140 mg) Subcutaneous every 14 days . Please have fasting labs drawn for further refills. 6 mL 0    ezetimibe (ZETIA) 10 MG tablet TAKE 1 TABLET EVERY DAY 90 tablet 3    ferrous gluconate (FERGON) 324 (38 Fe) MG tablet Take 1 tablet (324 mg) by mouth 3 times daily (with meals) 90 tablet 0    folic acid (FOLVITE) 1 MG tablet Take 1 tablet (1 mg) by mouth daily 100 tablet 0    furosemide (LASIX) 20 MG tablet Take 1 tablet (20 mg) by mouth daily 90 tablet 3    gabapentin (NEURONTIN) 250 MG/5ML solution Take 6 mLs (300 mg) by mouth at bedtime 180 mL 0    glucose (BD GLUCOSE) 5 g chewable tablet Take 2 tablets (10 g) by mouth as needed (low blood sugar) 40 tablet 1    hypromellose (ARTIFICIAL TEARS) 0.4 % SOLN ophthalmic solution Apply 1 drop to eye every hour as needed for dry eyes      levothyroxine (SYNTHROID/LEVOTHROID) 200 MCG tablet Take  1 tablet by mouth daily at 2 pm      linagliptin (TRADJENTA) 5 MG TABS tablet Take 1 tablet (5 mg) by mouth daily 90 tablet 1    meclizine (ANTIVERT) 25 MG tablet Take 1 tablet (25 mg) by mouth as needed for dizziness or other (migraines) 30 tablet 11    metoprolol succinate ER (TOPROL XL) 25 MG 24 hr tablet       Multiple Vitamins-Minerals (PRESERVISION AREDS 2) CAPS Take 1 capsule by mouth 2 times daily      omega-3 acid ethyl esters (LOVAZA) 1 g capsule Take 1 capsule by mouth 2 times daily 180 capsule 1    predniSONE (DELTASONE) 10 MG tablet Take 1 tablet (10 mg) by mouth daily 90 tablet 3    sirolimus (GENERIC EQUIVALENT) 0.5 MG tablet Take 1 tablet (0.5 mg) by mouth daily 90 tablet 3    SUMAtriptan (IMITREX) 50 MG tablet Take 1 tablet (50 mg) by mouth at onset of headache for migraine repeat after 2 hours if needed. 30 tablet 3    ursodiol (ACTIGALL) 250 MG tablet Take 1 tablet (250 mg) by mouth 2 times daily 180 tablet 3       ALLERGIES: Fluconazole; Mycophenolate; Penicillins; Simvastatin; Methotrexate; Morphine and codeine; Quinolones; Benadryl [diphenhydramine hcl]; Capsules, empty gelatin [gelatin]; Cefdinir; Cellcept; Ciprofloxacin; Lansoprazole; Levaquin [levofloxacin]; Methotrexate; Morphine sulfate; Azithromycin; Cefpodoxime; Cephalexin; Diphenhydramine; Doxycycline; Lisinopril; Omeprazole; Penicillin g; Tolectin [nsaids]; Tolmetin; and Tramadol      GENERAL PHYSICAL EXAM:   Vitals: LMP 06/01/1988 (Approximate)   PSYCH: NAD  EYES: EOMI  NEURO: AAO x3       ASSESSMENT:   Recurrent UTIs, liver transplant    PLAN:   -UA/UC if symptoms; would no longer recommend self treat as she had done in the past. Wants to have abx on hand to treat quickly after having a UC. She should ONLY have cath specimens for UC.   -Bladder irritant list provided  -Hydrate.  - Lifestyle and hygiene modifications were reviewed today in clinic, including wiping front to back, wearing cotton breathable underwear, voiding before and  after intercourse to flush the urethra, minimizing baths and opting for showers, and appropriate perineal hygiene. Push fluids to keep the urine dilute  -Estrace cream 3 x per week q HS  -CT w/o (CKD), ID consult tomorrow.     Edwina Riddle PA-C  [facility]    40 minutes spent on the date of the encounter doing chart review, review of outside records, review of test results, interpretation of tests, patient visit and documentation

## 2024-08-20 NOTE — LETTER
8/20/2024       RE: Luz Thompson  [address]  [facility] [id number]     Dear Colleague,    Thank you for referring your patient, Luz Thompson, to the [facility] [place] at [facility]. Please see a copy of my visit note below.    CC: Recurrent UTIs    HPI: It was my pleasure to meet Ms. Luz Thompson, a 74 year old year old female seen in consultation today at the request of Dr. Ramirez for recurrent UTIs. She is immunosuppressed (s/p liver tx).  These are typically symptomatic.  Typical symptoms include:  Frequency, urgency, burning, cloudy/odor, pain post urination, vaginal pain, bladder spasms. She denies gross hematuria, flank pain, fevers, chills.  She has no history of stones.  She has been hospitalized for UTIs (Last in [place] this year, tx with meropenem and eventual Bactrim, UC x 2, one no growth, 50-100k Kleb).       With infection, Ms. Luz Thompson typically goes to  (or [place]) where cultures are sometimes performed (see EMR). She was also doing self-treat in the past with Macrobid.  Is usually given an antibiotic and reports that symptoms do resolve appropriately with therapy for a short time. Therapies that have been tried include: Sherine, Erta, Bactrim, Vantin.     -Inciting events include: Patient does not hold urine beyond the urge to void  -Daily Fluid intake: water with minimal caffeine.    -Bowels: Reg    She has seen Dr. Waddell in the past (2019) and was recommended to follow-up in 3 months and consider cysto if not improved.     Cr 1.4 on 6/22/24. Renal US in 2023 neg for hydro.     No longer on vaginal estrogen pellet (became allergic to the gelatin) or cranberry tablet (uses d-mannose in cran juice). Has 29 allergies making it difficult to treat UTIs at times.     Has appt with ID tomorrow. Urology in [place] put her on daily Macrobid and ID in [place] took her off.   No longer taking anti-fungal.    Recalls a normal  "cysto and PVR in [place] this year. Has not had a recent CT.   Does well with Fosfomycin, Macrobid and Bactrim. ID in [place] had suggested Fosfomycin once a week as prophy, although she declined this at that time.     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      Diverticulosis of sigmoid colon 12/21/2013     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     Received Rituxan during Summer of 2016     H/O esophageal varices      Hearing loss      Heart murmur 4/4/2012     History of DVT (deep vein thrombosis)      History of [facility] fever      History of thyroid cancer 9/25/2012     Hyperlipidemia 4/10/2012    Says that she does not have it anymore, not on meds     Hyperlipidemia LDL goal <70      Hypertension goal BP (blood pressure) < 140/80 11/06/2013     Hypertriglyceridemia      Liver replaced by transplant (H) 10/17/2011    Dr. 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       Past Surgical History:   Procedure Laterality Date     APPENDECTOMY  1961     BIOPSY       CATARACT IOL, RT/LT      RE12/19/2013, LE12/10/2013 - Toric lenses     CHOLECYSTECTOMY  1991     COLONOSCOPY  3/10/2014    Procedure: " "COLONOSCOPY;;  Surgeon: Gentry Ramirez MD;  Location: [facility] GI     ear drum repair       ENDOBRONCHIAL ULTRASOUND FLEXIBLE N/A 2017    Procedure: ENDOBRONCHIAL ULTRASOUND FLEXIBLE;  Flexible Bronchoscopy, Endobronchial Ultrasound, Transbronchial Needle Aspiration ;  Surgeon: Edne Clinton MD;  Location: [facility] OR     ENDOSCOPIC RETROGRADE CHOLANGIOPANCREATOGRAM  2013    Procedure: ENDOSCOPIC RETROGRADE CHOLANGIOPANCREATOGRAM;  Endoscopic Retrograde Cholangiopancreatogram with single balloon enteroscopy, ballon sweep of bile duct;  Surgeon: Brett Membreno MD;  Location: [facility] OR     HC KNEE SCOPE,MED/LAT MENISECTOMY  8/10/12    Right, partial medial menisectomy only     KNEE SURGERY  1966    R knee     PICC INSERTION  2013    4fr SL PASV PICC, 40cm (1cm external) in the R basilic vein w/ tip in the low SVC     PICC INSERTION  2014    5 fr DL BioFlo Navilyst PICC, 46 cm (3 cm external) in the L basilic vein w/ tip in the SVC RA junction.     THYROIDECTOMY  3/2010     TRANSPLANT LIVER RECIPIENT LIVING UNRELATED         FAMILY HISTORY: Denies family history of urologic cancer.     Social History     Socioeconomic History     Marital status:      Spouse name: Robbin Thompson     Number of children: 4     Years of education: 20     Highest education level: Not on file   Occupational History     Occupation: Guardian Conservator      Employer: [employer]     Comment: social work     Employer: SELF   Tobacco Use     Smoking status: Former     Current packs/day: 0.00     Average packs/day: 1 pack/day for 18.0 years (18.0 ttl pk-yrs)     Types: Cigarettes     Start date: 1967     Quit date: 1985     Years since quittin.3     Smokeless tobacco: Never   Vaping Use     Vaping status: Never Used   Substance and Sexual Activity     Alcohol use: Yes     Alcohol/week: 0.0 standard drinks of alcohol     Comment: rare - \"I toast at weddings\"     Drug use: No     Sexual " activity: Yes     Partners: Male     Birth control/protection: Post-menopausal   Other Topics Concern     Parent/sibling w/ CABG, MI or angioplasty before 65F 55M? Not Asked      Service Not Asked     Blood Transfusions Not Asked     Caffeine Concern Not Asked     Occupational Exposure Not Asked     Hobby Hazards Not Asked     Sleep Concern Not Asked     Stress Concern Not Asked     Weight Concern Not Asked     Special Diet Not Asked     Back Care Not Asked     Exercise Yes     Comment: cardio and strengthing     Bike Helmet Not Asked     Seat Belt Not Asked     Self-Exams Not Asked   Social History Narrative    She is retired. She [place] in the [facility] [place] the course of the [place]. 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     Social Determinants of Health     Financial Resource Strain: Low Risk  (11/9/2023)    Received from [employer]    Overall Financial Resource Strain (CARDIA)      Difficulty of Paying Living Expenses: Not hard at all   Food Insecurity: No Food Insecurity (6/21/2024)    Received from [employer]    Hunger Vital Sign      Worried About Running Out of Food in the Last Year: Never true      Ran Out of Food in the Last Year: Never true   Transportation Needs: No Transportation Needs (6/21/2024)    Received from [employer]    PRAPARE - Transportation      Lack of Transportation (Medical): No      Lack of Transportation (Non-Medical): No   Physical Activity: Insufficiently Active (11/9/2023)    Received from [employer]    Exercise Vital Sign      Days of Exercise per Week: 5 days      Minutes of Exercise per Session: 10 min   Stress: No Stress Concern Present (11/9/2023)    Received from [employer]    [employer] [place] [employer] - Occupational Stress Questionnaire      Feeling of Stress : Only a little   Social Connections: Feeling Socially Integrated (12/20/2023)    Received from [employer]    OASIS  : Social Isolation      Frequency of experiencing loneliness or isolation: Never   Interpersonal Safety: Not At Risk (6/21/2024)    Received from [employer]    Humiliation, Afraid, Rape, and Kick questionnaire      Fear of Current or Ex-Partner: No      Emotionally Abused: No      Physically Abused: No      Sexually Abused: No   Housing Stability: Low Risk  (6/21/2024)    Received from [facility]    Housing Stability Vital Sign      Unable to Pay for Housing in the Last Year: No      Number of Times Moved in the Last Year: 1      Homeless in the Last Year: No      reports that she quit smoking about 39 years ago. Her smoking use included cigarettes. She started smoking about 57 years ago. She has a 18 pack-year smoking history. She has never used smokeless tobacco.    Current Outpatient Medications   Medication Sig Dispense Refill     acetaminophen (TYLENOL) 325 MG tablet Take 2 tablets (650 mg) by mouth every 6 hours as needed for mild pain or fever       apixaban ANTICOAGULANT (ELIQUIS ANTICOAGULANT) 2.5 MG tablet Take 1 tablet (2.5 mg) by mouth 2 times daily 180 tablet 3     azelastine (ASTELIN) 0.1 % nasal spray azelastine 137 mcg (0.1 %) nasal spray aerosol   Spray 1 spray every day by nasal route for 90 days.       calcitRIOL (ROCALTROL) 0.5 MCG capsule Take 1 capsule (0.5 mcg) by mouth daily 90 capsule 3     estradiol (ESTRACE) 0.1 MG/GM vaginal cream Place 2 g vaginally twice a week       evolocumab (REPATHA SURECLICK) 140 MG/ML prefilled autoinjector Inject 1 mL (140 mg) Subcutaneous every 14 days . Please have fasting labs drawn for further refills. 6 mL 0     ezetimibe (ZETIA) 10 MG tablet TAKE 1 TABLET EVERY DAY 90 tablet 3     ferrous gluconate (FERGON) 324 (38 Fe) MG tablet Take 1 tablet (324 mg) by mouth 3 times daily (with meals) 90 tablet 0     folic acid (FOLVITE) 1 MG tablet Take 1 tablet (1 mg) by mouth daily 100 tablet 0     furosemide (LASIX) 20 MG tablet Take 1 tablet (20 mg) by mouth  daily 90 tablet 3     gabapentin (NEURONTIN) 250 MG/5ML solution Take 6 mLs (300 mg) by mouth at bedtime 180 mL 0     glucose (BD GLUCOSE) 5 g chewable tablet Take 2 tablets (10 g) by mouth as needed (low blood sugar) 40 tablet 1     hypromellose (ARTIFICIAL TEARS) 0.4 % SOLN ophthalmic solution Apply 1 drop to eye every hour as needed for dry eyes       levothyroxine (SYNTHROID/LEVOTHROID) 200 MCG tablet Take 1 tablet by mouth daily at 2 pm       linagliptin (TRADJENTA) 5 MG TABS tablet Take 1 tablet (5 mg) by mouth daily 90 tablet 1     meclizine (ANTIVERT) 25 MG tablet Take 1 tablet (25 mg) by mouth as needed for dizziness or other (migraines) 30 tablet 11     metoprolol succinate ER (TOPROL XL) 25 MG 24 hr tablet        Multiple Vitamins-Minerals (PRESERVISION AREDS 2) CAPS Take 1 capsule by mouth 2 times daily       omega-3 acid ethyl esters (LOVAZA) 1 g capsule Take 1 capsule by mouth 2 times daily 180 capsule 1     predniSONE (DELTASONE) 10 MG tablet Take 1 tablet (10 mg) by mouth daily 90 tablet 3     sirolimus (GENERIC EQUIVALENT) 0.5 MG tablet Take 1 tablet (0.5 mg) by mouth daily 90 tablet 3     SUMAtriptan (IMITREX) 50 MG tablet Take 1 tablet (50 mg) by mouth at onset of headache for migraine repeat after 2 hours if needed. 30 tablet 3     ursodiol (ACTIGALL) 250 MG tablet Take 1 tablet (250 mg) by mouth 2 times daily 180 tablet 3       ALLERGIES: Fluconazole; Mycophenolate; Penicillins; Simvastatin; Methotrexate; Morphine and codeine; Quinolones; Benadryl [diphenhydramine hcl]; Capsules, empty gelatin [gelatin]; Cefdinir; Cellcept; Ciprofloxacin; Lansoprazole; Levaquin [levofloxacin]; Methotrexate; Morphine sulfate; Azithromycin; Cefpodoxime; Cephalexin; Diphenhydramine; Doxycycline; Lisinopril; Omeprazole; Penicillin g; Tolectin [nsaids]; Tolmetin; and Tramadol      GENERAL PHYSICAL EXAM:   Vitals: LMP 06/01/1988 (Approximate)   PSYCH: NAD  EYES: EOMI  NEURO: AAO x3       ASSESSMENT:   Recurrent UTIs,  liver transplant    PLAN:   -UA/UC if symptoms; would no longer recommend self treat as she had done in the past. Wants to have abx on hand to treat quickly after having a UC. She should ONLY have cath specimens for UC.   -Bladder irritant list provided  -Hydrate.  - Lifestyle and hygiene modifications were reviewed today in clinic, including wiping front to back, wearing cotton breathable underwear, voiding before and after intercourse to flush the urethra, minimizing baths and opting for showers, and appropriate perineal hygiene. Push fluids to keep the urine dilute  -Estrace cream 3 x per week q HS  -CT w/o (CKD), ID consult tomorrow.     Edwina Riddle PA-C  [facility]    40 minutes spent on the date of the encounter doing chart review, review of outside records, review of test results, interpretation of tests, patient visit and documentation       Again, thank you for allowing me to participate in the care of your patient.      Sincerely,    Edwina Riddle PA-C, MIKE

## 2024-08-20 NOTE — PATIENT INSTRUCTIONS
-CT  -Cysto (done)  -Infectious disease consult tomorrow  -Estrogen cream three-four times a week near urethra   CYSTOSCOPY    What is a Cystoscopy?  This is a procedure done to check for problems inside the bladder/prostate.  Problems may include polyps (growths), tumors, inflammation (swelling and redness), obstruction and other concerns.    The Urologist inserts a thin tube (called a cystoscope) into the bladder.  The tube is about the size of a pencil.  We will give you numbing medicine to reduce the pain or discomfort you may feel.    The Urologist will be able to see inside the bladder by filling the bladder with water.  The water makes it easier to see any problems that may be present. You will have a sense to need to urinate and this is normal.       How should I get ready for the exam?  Nothing to do to prepare. You may eat normally the day of the exam. There is no sedation, so you may drive yourself to and from if you can drive.       Please tell your doctor if:  You have a history of urinary tract infections.  You know that you have a tumor in your bladder.  You have bleeding problems.  You have any allergies.  You are or may be pregnant.      What happens after the exam?  You may go back to your normal diet and activity as you feel ready.    For the next two days after the exam, you may notice:  Some blood in your urine.  Some burning when you urinate (use the toilet).  An urge to urinate more often.  Bladder spasms.    These are normal after the procedure. They should go away on their own after a day or two.      You can help to relieve the above listed symptoms by:  Drinking 6 to 8 large glasses of water each day (includes drinks at meals).  This will help clear the urine.  Take warm baths to relieve pain and bladder spasms.  Do not add anything to the bath water.  You may take Tylenol (acetaminophen) per label instructions for discomfort.

## 2024-08-20 NOTE — NURSING NOTE
Pt here for rUTIs.  Pt thinks she has a uti today.  Pt drinks cranberry juice.    Sx: freq, dysuria, burns at end of flow.    Pt had cysto in [place].              AXEL Natarajan CMA

## 2024-08-22 LAB — BACTERIA UR CULT: ABNORMAL

## 2024-08-23 ENCOUNTER — ANCILLARY PROCEDURE (OUTPATIENT)
Dept: CARDIOLOGY | Facility: CLINIC | Age: 74
End: 2024-08-23
Attending: INTERNAL MEDICINE
Payer: MEDICARE

## 2024-08-23 VITALS
WEIGHT: 173 LBS | HEART RATE: 81 BPM | BODY MASS INDEX: 26.22 KG/M2 | HEIGHT: 68 IN | SYSTOLIC BLOOD PRESSURE: 124 MMHG | DIASTOLIC BLOOD PRESSURE: 78 MMHG | OXYGEN SATURATION: 98 %

## 2024-08-23 DIAGNOSIS — Z13.6 SCREENING FOR CARDIOVASCULAR CONDITION: ICD-10-CM

## 2024-08-23 DIAGNOSIS — N39.0 URINARY TRACT INFECTION: Primary | ICD-10-CM

## 2024-08-23 DIAGNOSIS — I10 HYPERTENSION GOAL BP (BLOOD PRESSURE) < 140/80: ICD-10-CM

## 2024-08-23 DIAGNOSIS — Z45.09 ENCOUNTER FOR LOOP RECORDER CHECK: Primary | ICD-10-CM

## 2024-08-23 DIAGNOSIS — Z94.4 LIVER REPLACED BY TRANSPLANT (H): ICD-10-CM

## 2024-08-23 DIAGNOSIS — N18.32 TYPE 2 DIABETES MELLITUS WITH STAGE 3B CHRONIC KIDNEY DISEASE, WITHOUT LONG-TERM CURRENT USE OF INSULIN (H): ICD-10-CM

## 2024-08-23 DIAGNOSIS — I48.0 PAROXYSMAL ATRIAL FIBRILLATION (H): ICD-10-CM

## 2024-08-23 DIAGNOSIS — N18.32 STAGE 3B CHRONIC KIDNEY DISEASE (H): ICD-10-CM

## 2024-08-23 DIAGNOSIS — I48.0 PAF (PAROXYSMAL ATRIAL FIBRILLATION) (H): Primary | ICD-10-CM

## 2024-08-23 DIAGNOSIS — Z87.440 HISTORY OF UTI: Primary | ICD-10-CM

## 2024-08-23 DIAGNOSIS — Z78.9 STATIN INTOLERANCE: ICD-10-CM

## 2024-08-23 DIAGNOSIS — E78.5 HYPERLIPIDEMIA LDL GOAL <70: ICD-10-CM

## 2024-08-23 DIAGNOSIS — Z95.0 CARDIAC PACEMAKER IN SITU: ICD-10-CM

## 2024-08-23 DIAGNOSIS — E78.00 HYPERCHOLESTEREMIA: ICD-10-CM

## 2024-08-23 DIAGNOSIS — E11.22 TYPE 2 DIABETES MELLITUS WITH STAGE 3B CHRONIC KIDNEY DISEASE, WITHOUT LONG-TERM CURRENT USE OF INSULIN (H): ICD-10-CM

## 2024-08-23 DIAGNOSIS — N39.0 URINARY TRACT INFECTION: ICD-10-CM

## 2024-08-23 LAB
MDC_IDC_PG_IMPLANT_DTM: NORMAL
MDC_IDC_PG_MFG: NORMAL
MDC_IDC_PG_MODEL: NORMAL
MDC_IDC_PG_SERIAL: NORMAL
MDC_IDC_PG_TYPE: NORMAL
MDC_IDC_SESS_CLINIC_NAME: NORMAL
MDC_IDC_SESS_DTM: NORMAL
MDC_IDC_SESS_TYPE: NORMAL

## 2024-08-23 PROCEDURE — G2211 COMPLEX E/M VISIT ADD ON: HCPCS | Performed by: INTERNAL MEDICINE

## 2024-08-23 PROCEDURE — 99204 OFFICE O/P NEW MOD 45 MIN: CPT | Mod: 25 | Performed by: INTERNAL MEDICINE

## 2024-08-23 PROCEDURE — 93000 ELECTROCARDIOGRAM COMPLETE: CPT | Performed by: INTERNAL MEDICINE

## 2024-08-23 PROCEDURE — 93298 REM INTERROG DEV EVAL SCRMS: CPT | Performed by: INTERNAL MEDICINE

## 2024-08-23 RX ORDER — NITROFURANTOIN 25; 75 MG/1; MG/1
100 CAPSULE ORAL 2 TIMES DAILY
Qty: 14 CAPSULE | Refills: 0 | Status: SHIPPED | OUTPATIENT
Start: 2024-08-23 | End: 2024-08-30

## 2024-08-23 RX ORDER — HYDRALAZINE HYDROCHLORIDE 25 MG/1
25 TABLET, FILM COATED ORAL PRN
COMMUNITY
Start: 2024-08-23

## 2024-08-23 NOTE — PROGRESS NOTES
Patient is showing 3/5 MNCM met. Not on statin   Outcome for 09/05/24 1:37 PM: 6Rooms message sent  Shannon Dove CMA

## 2024-08-23 NOTE — LETTER
8/23/2024    Physician No Ref-Primary  No address on file    RE: Luz Thompson       Dear Colleague,     I had the pleasure of seeing Luz Thompson in the [facility].  HPI and Plan:   Virginia is a very nice 74-year-old woman who is moving back from [place] and establishing with cardiology.  She previously followed through the [facility].  Her cardiologist in [place] is a nonepic cardiologist.  I can review some of her lab work and testing as some but was done through [facility] which is in [facility].    She has a past medical history significant for liver transplant in 2002, paroxysmal atrial fibrillation on apixaban, hypertension, chronic renal failure stage IIIb, mixed hyperlipidemia with original LDL of 198 with statin intolerance now on Repatha, cerebrovascular accident approximately 23 years ago thought to be due to hypotension, DVT, Sjogren's syndrome.    EKG today is a normal sinus rhythm and a normal EKG  Records that I can see in [facility] including echocardiogram in November 2023 described an ejection fraction of 65 to 70%.  Grade 1 diastolic dysfunction, sclerotic aortic valve with mild aortic insufficiency and a mean gradient of 9.  Mitral annular calcification with thickening of her mitral valve.    Lab work does show low albumin 3.0 total protein low at 6.0.  Last fasting lipid profile that I see is a total cholesterol of 223 with an HDL of 47 and triglycerides of 574 from November 2023    She states she had extensive cardiac testing done.  She does have an implanted Medtr[employer] Linq 2 device.  She is plugged into our device clinic.    Virginia states she treats her paroxysmal atrial fibrillation which she states occurs quite rarely by taking metoprolol succinate 25 mg daily and uses it as a pill in the pocket if she should have a breakthrough.  She has had no chest arm neck jaw or shoulder discomfort.  She has no orthopnea or PND.  She states usually when she has  an episode of A-fib she just sits down and relaxes and it converts fairly quickly as stated she will use as needed metoprolol.  Episodes are usually triggered by stress.  She has no bleeding problems.  She has had no further incidence of TIA or CVA.    She states she also treats her blood pressure which is normally quite well-controlled with as needed hydralazine.  She will take 25 or 50 mg in the evening if she develops a blood pressure greater than 160.  This may occur as often as 3 times a week.  She states her blood pressure can be quite labile and can dropped fairly precipitously.    She takes Lasix for her peripheral edema.    Interrogation of her Linq shows most recent tachycardia was on June 23 and appeared to be SVT.  Her longest episode of atrial fibrillation appears to be 2 hours and 14 minutes occurring on April 10, 2024.    Assessment and plan.  Virginia has no symptoms at this time to suggest ischemia or heart failure.  I do not see any coronary evaluation but she states she had one in [place] and I will obtain these records.    She is quite comfortable with continuing to control her A-fib with metoprolol plus metoprolol as pill in the pocket.  We talked about consideration of referral to EP for A-fib ablation and she is not interested.    Review of the chart shows blood pressure to be very well-controlled.  I told her she can continue to take her hydralazine on a as needed basis.    Most recent fasting lipid profile still shows triglycerides to be quite elevated.  I will recheck these.  For now we will continue her on Zetia and Repatha.    I have also advised that she should consider getting an Apple Watch if she wants immediate feedback as to her rhythm.    I will follow-up as appropriate otherwise we will plan on visit in 1 year.  If she should have any problems would be glad to see her sooner. Thank you for allow me to participate in this patient's care.  Sincerely,                                Kirill Zuluaga MD [facility]    The longitudinal plan of care for the diagnosis(es)/condition(s) as documented were addressed during this visit. Due to the added complexity in care, I will continue to support Virginia in the subsequent management and with ongoing continuity of care.        Today's clinic visit entailed:  Review of external notes as documented elsewhere in note  Review of the result(s) of each unique test - echocardiogram, lab work, EKG  The following tests were independently interpreted by me as noted in my documentation: EKG  Ordering of each unique test  Prescription drug management  45 minutes spent by me on the date of the encounter doing chart review, history and exam, documentation and further activities per the note  Provider  Link to MDM Help Grid     The level of medical decision making during this visit was of moderate complexity.      Orders Placed This Encounter   Procedures     Lipid Profile     ALT     EKG 12-lead complete w/read - Clinics (performed today)       Orders Placed This Encounter   Medications     hydrALAZINE (APRESOLINE) 25 MG tablet     Sig: Take 1 tablet (25 mg) by mouth as needed (systolic bp > 160).       There are no discontinued medications.      Encounter Diagnoses   Name Primary?     Liver replaced by transplant (H)      Screening for cardiovascular condition      Statin intolerance      Hypercholesteremia      Hyperlipidemia LDL goal <70      Hypertension goal BP (blood pressure) < 140/80      Type 2 diabetes mellitus with stage 3b chronic kidney disease, without long-term current use of insulin (H)      Stage 3b chronic kidney disease (H)      PAF (paroxysmal atrial fibrillation) (H) Yes       CURRENT MEDICATIONS:  Current Outpatient Medications   Medication Sig Dispense Refill     acetaminophen (TYLENOL) 325 MG tablet Take 2 tablets (650 mg) by mouth every 6 hours as needed for mild pain or fever       apixaban ANTICOAGULANT (ELIQUIS ANTICOAGULANT) 2.5 MG tablet  Take 1 tablet (2.5 mg) by mouth 2 times daily 180 tablet 3     azelastine (ASTELIN) 0.1 % nasal spray azelastine 137 mcg (0.1 %) nasal spray aerosol   Spray 1 spray every day by nasal route for 90 days.       calcitRIOL (ROCALTROL) 0.5 MCG capsule Take 1 capsule (0.5 mcg) by mouth daily 90 capsule 3     COMPOUNDED NON-CONTROLLED SUBSTANCE (CMPD RX) - PHARMACY TO MIX COMPOUNDED MEDICATION Twice a day x7 days 14 capsule 0     Continuous Glucose Sensor (FREESTYLE NINO 2 SENSOR) MISC AS DIRECTED REPLACE EVERY 14 DAYS       estradiol (ESTRACE) 0.1 MG/GM vaginal cream Place 2 g vaginally twice a week       evolocumab (REPATHA SURECLICK) 140 MG/ML prefilled autoinjector Inject 1 mL (140 mg) Subcutaneous every 14 days . Please have fasting labs drawn for further refills. 6 mL 0     ezetimibe (ZETIA) 10 MG tablet TAKE 1 TABLET EVERY DAY 90 tablet 3     ferrous gluconate (FERGON) 324 (38 Fe) MG tablet Take 1 tablet (324 mg) by mouth 3 times daily (with meals) 90 tablet 0     Ferrous Sulfate 324 MG TBEC Take 1 tablet twice a day by oral route.       folic acid (FOLVITE) 1 MG tablet Take 1 tablet (1 mg) by mouth daily 100 tablet 0     furosemide (LASIX) 20 MG tablet Take 1 tablet (20 mg) by mouth daily 90 tablet 3     gabapentin (NEURONTIN) 250 MG/5ML solution Take 6 mLs (300 mg) by mouth at bedtime 180 mL 0     glucose (BD GLUCOSE) 5 g chewable tablet Take 2 tablets (10 g) by mouth as needed (low blood sugar) 40 tablet 1     hydrALAZINE (APRESOLINE) 25 MG tablet Take 1 tablet (25 mg) by mouth as needed (systolic bp > 160).       hypromellose (ARTIFICIAL TEARS) 0.4 % SOLN ophthalmic solution Apply 1 drop to eye every hour as needed for dry eyes       levothyroxine (SYNTHROID/LEVOTHROID) 200 MCG tablet Take 1 tablet by mouth daily at 2 pm       linagliptin (TRADJENTA) 5 MG TABS tablet Take 1 tablet (5 mg) by mouth daily 90 tablet 1     meclizine (ANTIVERT) 25 MG tablet Take 1 tablet (25 mg) by mouth as needed for dizziness or  other (migraines) 30 tablet 11     metoprolol succinate ER (TOPROL XL) 25 MG 24 hr tablet        Multiple Vitamins-Minerals (PRESERVISION AREDS 2) CAPS Take 1 capsule by mouth 2 times daily       omega-3 acid ethyl esters (LOVAZA) 1 g capsule Take 1 capsule by mouth 2 times daily 180 capsule 1     predniSONE (DELTASONE) 10 MG tablet Take 1 tablet (10 mg) by mouth daily 90 tablet 3     sirolimus (GENERIC EQUIVALENT) 0.5 MG tablet Take 1 tablet (0.5 mg) by mouth daily 90 tablet 3     sodium chloride 0.65 % nasal spray Spray 2 sprays in nostril       sulfamethoxazole-trimethoprim (BACTRIM) 400-80 MG tablet Take 1 tablet by mouth 2 times daily 14 tablet 0     SUMAtriptan (IMITREX) 50 MG tablet Take 1 tablet (50 mg) by mouth at onset of headache for migraine repeat after 2 hours if needed. 30 tablet 3     ursodiol (ACTIGALL) 250 MG tablet Take 1 tablet (250 mg) by mouth 2 times daily 180 tablet 3     nitroFURantoin macrocrystal-monohydrate (MACROBID) 100 MG capsule Take 1 capsule (100 mg) by mouth 2 times daily for 7 days. 14 capsule 0       ALLERGIES     Allergies   Allergen Reactions     Fluconazole Hives and Itching     Full body hives       Mycophenolate Diarrhea and Nausea and Vomiting     Patient stated it was chronic and lasted months       Penicillins Rash, Anaphylaxis, Hives and Itching     Simvastatin Muscle Pain (Myalgia)     severe  Other reaction(s): Myalgia caused by statin     Methotrexate Other (See Comments)     Other reaction(s): Sore  Sores in mouth, esophagus, and stomach.        Morphine And Codeine Itching and Other (See Comments)     Psych disturbance  Other reaction(s): Confusion, Mood alteration     Quinolones Anxiety, Dizziness, Headache, Other (See Comments), Palpitations and Unknown     Other reaction(s): Hyperactive behavior, Lightheadedness, Mood alteration    Dizzy, light headed    Dizziness, shaky, and jumpy     Benadryl [Diphenhydramine Hcl]      Insomnia      Capsules, Empty Gelatin  "[Gelatin]      Cefdinir Itching     Cellcept Diarrhea     Ciprofloxacin Other (See Comments)     Insomnia, mood lability, Irregular heart beat, anxiety     Ciprofloxacin Hcl Dizziness and Other (See Comments)     Lansoprazole Diarrhea     Levaquin [Levofloxacin] Other (See Comments)     Headache, hyperactivity     Methotrexate      Sores     Morphine Sulfate Itching     Azithromycin Itching     Cefpodoxime Itching     Cephalexin Rash and Itching     Fever and skin burning  Other reaction(s): Fever, Skin burning     Diphenhydramine Other (See Comments)     Doxycycline Itching and Unknown     Lisinopril Cough     Omeprazole Itching     Other Drug Allergy (See Comments) Hives, Unknown and Rash     Penicillin G Itching and Rash     Tolectin [Nsaids] Rash     Tolmetin Rash and Itching     Tramadol Rash, Hives and Itching       PAST MEDICAL HISTORY:  Past Medical History:   Diagnosis Date     Anemia of other chronic disease 10/17/2011     Anxiety      Bladder infection, chronic 04/04/2012     CKD (chronic kidney disease) stage 3, GFR 30-59 ml/min (H) 04/04/2012     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      Diverticulosis of sigmoid colon 12/21/2013     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     Received Rituxan during Summer of 2016     H/O esophageal varices      Hearing loss      Heart murmur 04/04/2012     History of DVT (deep vein thrombosis)      History of [facility] fever      History of thrombophlebitis      History of thyroid cancer 09/25/2012     Hyperlipidemia 04/10/2012    Says that she does not have it anymore, not on meds     Hyperlipidemia LDL goal <70      Hypertension goal BP (blood pressure) < 140/80 11/06/2013     Hypertriglyceridemia      Liver replaced by transplant (H) 10/17/2011    Dr. 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       PAST SURGICAL HISTORY:  Past Surgical History:   Procedure Laterality Date     APPENDECTOMY  1961     BIOPSY       CATARACT IOL, RT/LT      RE12/19/2013, LE12/10/2013 - Toric lenses     CHOLECYSTECTOMY  1991     COLONOSCOPY  03/10/2014    Procedure: COLONOSCOPY;;  Surgeon: Gentry Ramirez MD;  Location: [facility]     CYSTOSCOPY       ear drum repair       ENDOBRONCHIAL ULTRASOUND FLEXIBLE N/A 09/29/2017    Procedure: ENDOBRONCHIAL ULTRASOUND FLEXIBLE;  Flexible Bronchoscopy, Endobronchial Ultrasound, Transbronchial Needle Aspiration ;  Surgeon: Eden Clinton MD;  Location: [facility] OR     ENDOSCOPIC RETROGRADE CHOLANGIOPANCREATOGRAM  09/19/2013    Procedure: ENDOSCOPIC RETROGRADE CHOLANGIOPANCREATOGRAM;  Endoscopic Retrograde Cholangiopancreatogram with single balloon enteroscopy, ballon sweep of bile duct;  Surgeon: Brett Membreno MD;  Location: [facility] OR      KNEE SCOPE,MED/LAT MENISECTOMY  08/10/2012    Right, partial medial menisectomy only     KNEE SURGERY  1966    R knee     PICC INSERTION  09/18/2013    4fr SL PASV PICC, 40cm (1cm external) in the R basilic vein w/ tip in the low SVC     PICC INSERTION  02/21/2014    5 fr DL BioFlo Navilyst PICC, 46 cm (3 cm external) in the L basilic vein w/ tip in the SVC RA junction.     THYROIDECTOMY  03/2010     TRANSPLANT LIVER RECIPIENT LIVING UNRELATED  05/2002       FAMILY HISTORY:  Family History   Problem Relation Age of Onset     Hypertension Mother      Endometrial  "Cancer Mother      Hyperlipidemia Mother      Prostate Cancer Father      Macular Degeneration Father      Cancer - colorectal Maternal Grandmother         in her 80's, has surgery and removal of part of kidney,  at age 98     Heart Disease Maternal Grandfather          at 98     Glaucoma Maternal Grandfather      Cerebrovascular Disease Paternal Grandmother         in her 80's     Hypertension Paternal Grandmother      Heart Disease Paternal Grandfather         MI     Alzheimer Disease Paternal Grandfather      Allergies Son      Neurologic Disorder Daughter         Migraines     Breast Cancer Other      Anesthesia Reaction No family hx of      Crohn's Disease No family hx of      Ulcerative Colitis No family hx of        SOCIAL HISTORY:  Social History     Socioeconomic History     Marital status:      Spouse name: Robbin Thompson     Number of children: 4     Years of education: 20     Highest education level: None   Occupational History     Occupation: Guardian Conservator      Employer: [employer]     Comment: social work     Employer: SELF   Tobacco Use     Smoking status: Former     Current packs/day: 0.00     Average packs/day: 1 pack/day for 18.0 years (18.0 ttl pk-yrs)     Types: Cigarettes     Start date: 1967     Quit date: 1985     Years since quittin.3     Smokeless tobacco: Never   Vaping Use     Vaping status: Never Used   Substance and Sexual Activity     Alcohol use: Yes     Alcohol/week: 0.0 standard drinks of alcohol     Comment: rare - \"I toast at weddings\"     Drug use: No     Sexual activity: Yes     Partners: Male     Birth control/protection: Post-menopausal   Other Topics Concern     Exercise Yes     Comment: cardio and strengthing   Social History Narrative    She is retired. She lives in the [place] [place] of the [place]. [place] with hogs, cows, corn and soybean crops. "     Social Determinants of Health     Financial Resource Strain: Low Risk  (11/9/2023)    Received from [facility]    Overall Financial Resource Strain (CARDIA)      Difficulty of Paying Living Expenses: Not hard at all   Food Insecurity: No Food Insecurity (6/21/2024)    Received from [facility]    Hunger Vital Sign      Worried About Running Out of Food in the Last Year: Never true      Ran Out of Food in the Last Year: Never true   Transportation Needs: No Transportation Needs (6/21/2024)    Received from [facility]    PRAPARE - Transportation      Lack of Transportation (Medical): No      Lack of Transportation (Non-Medical): No   Physical Activity: Insufficiently Active (11/9/2023)    Received from [facility]    Exercise Vital Sign      Days of Exercise per Week: 5 days      Minutes of Exercise per Session: 10 min   Stress: No Stress Concern Present (11/9/2023)    Received from [facility]    [facility] - Occupational Stress Questionnaire      Feeling of Stress : Only a little   Social Connections: Feeling Socially Integrated (12/20/2023)    Received from [facility]    OASIS : Social Isolation      Frequency of experiencing loneliness or isolation: Never   Interpersonal Safety: Not At Risk (6/21/2024)    Received from [facility]    Humiliation, Afraid, Rape, and Kick questionnaire      Fear of Current or Ex-Partner: No      Emotionally Abused: No      Physically Abused: No      Sexually Abused: No   Housing Stability: Low Risk  (6/21/2024)    Received from [facility]    Housing Stability Vital Sign      Unable to Pay for Housing in the Last Year: No      Number of Times Moved in the Last Year: 1      Homeless in the Last Year: No       Review of Systems:  Skin:          Eyes:         ENT:         Respiratory:  Positive for shortness of breath SOB with afib episodes   Cardiovascular:    Positive for;edema    Gastroenterology:        Genitourinary:        "  Musculoskeletal:         Neurologic:         Psychiatric:         Heme/Lymph/Imm:         Endocrine:           Physical Exam:  Vitals: /78 (BP Location: Right arm, Patient Position: Sitting, Cuff Size: Adult Regular)   Pulse 81   Ht 1.715 m (5' 7.5\")   Wt 78.5 kg (173 lb)   LMP 06/01/1988 (Approximate)   SpO2 98%   BMI 26.70 kg/m      Constitutional:  cooperative, alert and oriented, well developed, well nourished, in no acute distress overweight In a wheelchair    Skin:  warm and dry to the touch, no apparent skin lesions or masses noted          Head:  normocephalic, no masses or lesions        Eyes:  pupils equal and round, conjunctivae and lids unremarkable, sclera white, no xanthalasma, EOMS intact, no nystagmus        Lymph:      ENT:  no pallor or cyanosis, dentition good        Neck:  no carotid bruit        Respiratory:  normal breath sounds, clear to auscultation, normal A-P diameter, normal symmetry, normal respiratory excursion, no use of accessory muscles         Cardiac: regular rhythm;no murmurs, gallops or rubs detected                pulses full and equal                                        GI:           Extremities and Muscular Skeletal:  no spinal abnormalities noted;normal muscle strength and tone   bilateral LE edema;trace          Neurological:  no gross motor deficits        Psych:  affect appropriate, oriented to time, person and place        CC  Gentry Ramirez MD  [address] [place] [id number]                Thank you for allowing me to participate in the care of your patient.      Sincerely,     Kirill Zuluaga MD     [facility]  cc:   Gentry Ramirez MD  [address] [place] [id number]      "

## 2024-08-23 NOTE — PROGRESS NOTES
HPI and Plan:   Virginia is a very nice 74-year-old woman who is moving back from [place] and establishing with cardiology.  She previously followed through the [facility].  Her cardiologist in [place] is a nonepic cardiologist.  I can review some of her lab work and testing as some but was done through [facility] which is in [facility].    She has a past medical history significant for liver transplant in 2002, paroxysmal atrial fibrillation on apixaban, hypertension, chronic renal failure stage IIIb, mixed hyperlipidemia with original LDL of 198 with statin intolerance now on Repatha, cerebrovascular accident approximately 23 years ago thought to be due to hypotension, DVT, Sjogren's syndrome.    EKG today is a normal sinus rhythm and a normal EKG  Records that I can see in [facility] including echocardiogram in November 2023 described an ejection fraction of 65 to 70%.  Grade 1 diastolic dysfunction, sclerotic aortic valve with mild aortic insufficiency and a mean gradient of 9.  Mitral annular calcification with thickening of her mitral valve.    Lab work does show low albumin 3.0 total protein low at 6.0.  Last fasting lipid profile that I see is a total cholesterol of 223 with an HDL of 47 and triglycerides of 574 from November 2023    She states she had extensive cardiac testing done.  She does have an implanted Medtr[employer] Linq 2 device.  She is plugged into our device clinic.    Virginia states she treats her paroxysmal atrial fibrillation which she states occurs quite rarely by taking metoprolol succinate 25 mg daily and uses it as a pill in the pocket if she should have a breakthrough.  She has had no chest arm neck jaw or shoulder discomfort.  She has no orthopnea or PND.  She states usually when she has an episode of A-fib she just sits down and relaxes and it converts fairly quickly as stated she will use as needed metoprolol.  Episodes are usually triggered by stress.  She has no bleeding  problems.  She has had no further incidence of TIA or CVA.    She states she also treats her blood pressure which is normally quite well-controlled with as needed hydralazine.  She will take 25 or 50 mg in the evening if she develops a blood pressure greater than 160.  This may occur as often as 3 times a week.  She states her blood pressure can be quite labile and can dropped fairly precipitously.    She takes Lasix for her peripheral edema.    Interrogation of her Linq shows most recent tachycardia was on June 23 and appeared to be SVT.  Her longest episode of atrial fibrillation appears to be 2 hours and 14 minutes occurring on April 10, 2024.    Assessment and plan.  Virginia has no symptoms at this time to suggest ischemia or heart failure.  I do not see any coronary evaluation but she states she had one in [place] and I will obtain these records.    She is quite comfortable with continuing to control her A-fib with metoprolol plus metoprolol as pill in the pocket.  We talked about consideration of referral to EP for A-fib ablation and she is not interested.    Review of the chart shows blood pressure to be very well-controlled.  I told her she can continue to take her hydralazine on a as needed basis.    Most recent fasting lipid profile still shows triglycerides to be quite elevated.  I will recheck these.  For now we will continue her on Zetia and Repatha.    I have also advised that she should consider getting an Apple Watch if she wants immediate feedback as to her rhythm.    I will follow-up as appropriate otherwise we will plan on visit in 1 year.  If she should have any problems would be glad to see her sooner. Thank you for allow me to participate in this patient's care.  Sincerely,                               Kirill Zuluaga MD formerly [facility]    The longitudinal plan of care for the diagnosis(es)/condition(s) as documented were addressed during this visit. Due to the added complexity in care, I will continue  to support Virginia in the subsequent management and with ongoing continuity of care.        Today's clinic visit entailed:  Review of external notes as documented elsewhere in note  Review of the result(s) of each unique test - echocardiogram, lab work, EKG  The following tests were independently interpreted by me as noted in my documentation: EKG  Ordering of each unique test  Prescription drug management  45 minutes spent by me on the date of the encounter doing chart review, history and exam, documentation and further activities per the note  Provider  Link to [facility] Help Grid     The level of medical decision making during this visit was of moderate complexity.      Orders Placed This Encounter   Procedures    Lipid Profile    ALT    EKG 12-lead complete w/read - Clinics (performed today)       Orders Placed This Encounter   Medications    hydrALAZINE (APRESOLINE) 25 MG tablet     Sig: Take 1 tablet (25 mg) by mouth as needed (systolic bp > 160).       There are no discontinued medications.      Encounter Diagnoses   Name Primary?    Liver replaced by transplant (H)     Screening for cardiovascular condition     Statin intolerance     Hypercholesteremia     Hyperlipidemia LDL goal <70     Hypertension goal BP (blood pressure) < 140/80     Type 2 diabetes mellitus with stage 3b chronic kidney disease, without long-term current use of insulin (H)     Stage 3b chronic kidney disease (H)     PAF (paroxysmal atrial fibrillation) (H) Yes       CURRENT MEDICATIONS:  Current Outpatient Medications   Medication Sig Dispense Refill    acetaminophen (TYLENOL) 325 MG tablet Take 2 tablets (650 mg) by mouth every 6 hours as needed for mild pain or fever      apixaban ANTICOAGULANT (ELIQUIS ANTICOAGULANT) 2.5 MG tablet Take 1 tablet (2.5 mg) by mouth 2 times daily 180 tablet 3    azelastine (ASTELIN) 0.1 % nasal spray azelastine 137 mcg (0.1 %) nasal spray aerosol   Spray 1 spray every day by nasal route for 90 days.       calcitRIOL (ROCALTROL) 0.5 MCG capsule Take 1 capsule (0.5 mcg) by mouth daily 90 capsule 3    COMPOUNDED NON-CONTROLLED SUBSTANCE (CMPD RX) - PHARMACY TO MIX COMPOUNDED MEDICATION Twice a day x7 days 14 capsule 0    Continuous Glucose Sensor (FREESTYLE NINO 2 SENSOR) MISC AS DIRECTED REPLACE EVERY 14 DAYS      estradiol (ESTRACE) 0.1 MG/GM vaginal cream Place 2 g vaginally twice a week      evolocumab (REPATHA SURECLICK) 140 MG/ML prefilled autoinjector Inject 1 mL (140 mg) Subcutaneous every 14 days . Please have fasting labs drawn for further refills. 6 mL 0    ezetimibe (ZETIA) 10 MG tablet TAKE 1 TABLET EVERY DAY 90 tablet 3    ferrous gluconate (FERGON) 324 (38 Fe) MG tablet Take 1 tablet (324 mg) by mouth 3 times daily (with meals) 90 tablet 0    Ferrous Sulfate 324 MG TBEC Take 1 tablet twice a day by oral route.      folic acid (FOLVITE) 1 MG tablet Take 1 tablet (1 mg) by mouth daily 100 tablet 0    furosemide (LASIX) 20 MG tablet Take 1 tablet (20 mg) by mouth daily 90 tablet 3    gabapentin (NEURONTIN) 250 MG/5ML solution Take 6 mLs (300 mg) by mouth at bedtime 180 mL 0    glucose (BD GLUCOSE) 5 g chewable tablet Take 2 tablets (10 g) by mouth as needed (low blood sugar) 40 tablet 1    hydrALAZINE (APRESOLINE) 25 MG tablet Take 1 tablet (25 mg) by mouth as needed (systolic bp > 160).      hypromellose (ARTIFICIAL TEARS) 0.4 % SOLN ophthalmic solution Apply 1 drop to eye every hour as needed for dry eyes      levothyroxine (SYNTHROID/LEVOTHROID) 200 MCG tablet Take 1 tablet by mouth daily at 2 pm      linagliptin (TRADJENTA) 5 MG TABS tablet Take 1 tablet (5 mg) by mouth daily 90 tablet 1    meclizine (ANTIVERT) 25 MG tablet Take 1 tablet (25 mg) by mouth as needed for dizziness or other (migraines) 30 tablet 11    metoprolol succinate ER (TOPROL XL) 25 MG 24 hr tablet       Multiple Vitamins-Minerals (PRESERVISION AREDS 2) CAPS Take 1 capsule by mouth 2 times daily      omega-3 acid ethyl esters  (LOVAZA) 1 g capsule Take 1 capsule by mouth 2 times daily 180 capsule 1    predniSONE (DELTASONE) 10 MG tablet Take 1 tablet (10 mg) by mouth daily 90 tablet 3    sirolimus (GENERIC EQUIVALENT) 0.5 MG tablet Take 1 tablet (0.5 mg) by mouth daily 90 tablet 3    sodium chloride 0.65 % nasal spray Spray 2 sprays in nostril      sulfamethoxazole-trimethoprim (BACTRIM) 400-80 MG tablet Take 1 tablet by mouth 2 times daily 14 tablet 0    SUMAtriptan (IMITREX) 50 MG tablet Take 1 tablet (50 mg) by mouth at onset of headache for migraine repeat after 2 hours if needed. 30 tablet 3    ursodiol (ACTIGALL) 250 MG tablet Take 1 tablet (250 mg) by mouth 2 times daily 180 tablet 3    nitroFURantoin macrocrystal-monohydrate (MACROBID) 100 MG capsule Take 1 capsule (100 mg) by mouth 2 times daily for 7 days. 14 capsule 0       ALLERGIES     Allergies   Allergen Reactions    Fluconazole Hives and Itching     Full body hives      Mycophenolate Diarrhea and Nausea and Vomiting     Patient stated it was chronic and lasted months      Penicillins Rash, Anaphylaxis, Hives and Itching    Simvastatin Muscle Pain (Myalgia)     severe  Other reaction(s): Myalgia caused by statin    Methotrexate Other (See Comments)     Other reaction(s): Sore  Sores in mouth, esophagus, and stomach.       Morphine And Codeine Itching and Other (See Comments)     Psych disturbance  Other reaction(s): Confusion, Mood alteration    Quinolones Anxiety, Dizziness, Headache, Other (See Comments), Palpitations and Unknown     Other reaction(s): Hyperactive behavior, Lightheadedness, Mood alteration    Dizzy, light headed    Dizziness, shaky, and jumpy    Benadryl [Diphenhydramine Hcl]      Insomnia     Capsules, Empty Gelatin [Gelatin]     Cefdinir Itching    Cellcept Diarrhea    Ciprofloxacin Other (See Comments)     Insomnia, mood lability, Irregular heart beat, anxiety    Ciprofloxacin Hcl Dizziness and Other (See Comments)    Lansoprazole Diarrhea    Levaquin  "[Levofloxacin] Other (See Comments)     Headache, hyperactivity    Methotrexate      Sores    Morphine Sulfate Itching    Azithromycin Itching    Cefpodoxime Itching    Cephalexin Rash and Itching     Fever and skin burning  Other reaction(s): Fever, Skin burning    Diphenhydramine Other (See Comments)    Doxycycline Itching and Unknown    Lisinopril Cough    Omeprazole Itching    Other Drug Allergy (See Comments) Hives, Unknown and Rash    Penicillin G Itching and Rash    Tolectin [Nsaids] Rash    Tolmetin Rash and Itching    Tramadol Rash, Hives and Itching       PAST MEDICAL HISTORY:  Past Medical History:   Diagnosis Date    Anemia of other chronic disease 10/17/2011    Anxiety     Bladder infection, chronic 04/04/2012    CKD (chronic kidney disease) stage 3, GFR 30-59 ml/min (H) 04/04/2012    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     Diverticulosis of sigmoid colon 12/21/2013    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     Received Rituxan during Summer of 2016    H/O esophageal varices     Hearing loss     Heart murmur 04/04/2012    History of DVT (deep vein thrombosis)     History of [place] fever     History of thrombophlebitis     History of thyroid cancer 09/25/2012    Hyperlipidemia 04/10/2012    Says that she does not have it anymore, not on meds    Hyperlipidemia LDL goal <70     Hypertension goal BP (blood pressure) < 140/80 11/06/2013    Hypertriglyceridemia     Liver replaced by transplant (H) 10/17/2011    Dr. 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       PAST SURGICAL HISTORY:  Past Surgical History:   Procedure Laterality Date    APPENDECTOMY      BIOPSY      CATARACT IOL, RT/LT      RE2013, LE12/10/2013 - Toric lenses    CHOLECYSTECTOMY      COLONOSCOPY  03/10/2014    Procedure: COLONOSCOPY;;  Surgeon: Gentry Ramirez MD;  Location: [facility] GI    CYSTOSCOPY      ear drum repair      ENDOBRONCHIAL ULTRASOUND FLEXIBLE N/A 2017    Procedure: ENDOBRONCHIAL ULTRASOUND FLEXIBLE;  Flexible Bronchoscopy, Endobronchial Ultrasound, Transbronchial Needle Aspiration ;  Surgeon: Eden Clinton MD;  Location: [facility] OR    ENDOSCOPIC RETROGRADE CHOLANGIOPANCREATOGRAM  2013    Procedure: ENDOSCOPIC RETROGRADE CHOLANGIOPANCREATOGRAM;  Endoscopic Retrograde Cholangiopancreatogram with single balloon enteroscopy, ballon sweep of bile duct;  Surgeon: Brett Membreno MD;  Location: [facility] OR     KNEE SCOPE,MED/LAT MENISECTOMY  08/10/2012    Right, partial medial menisectomy only    KNEE SURGERY  1966    R knee    PICC INSERTION  2013    4fr SL PASV PICC, 40cm (1cm external) in the R basilic vein w/ tip in the low SVC    PICC INSERTION  2014    5 fr DL BioFlo Navilyst PICC, 46 cm (3 cm external) in the L basilic vein w/ tip in the SVC RA junction.    THYROIDECTOMY  2010    TRANSPLANT LIVER RECIPIENT LIVING UNRELATED  2002       FAMILY HISTORY:  Family History   Problem Relation Age of Onset    Hypertension Mother     Endometrial Cancer Mother     Hyperlipidemia Mother     Prostate Cancer Father     Macular Degeneration Father     Cancer - colorectal Maternal Grandmother         in her 80's, has surgery and removal of part of kidney,  at age 98    Heart Disease Maternal Grandfather          at 98    Glaucoma Maternal Grandfather      "Cerebrovascular Disease Paternal Grandmother         in her 80's    Hypertension Paternal Grandmother     Heart Disease Paternal Grandfather         MI    Alzheimer Disease Paternal Grandfather     Allergies Son     Neurologic Disorder Daughter         Migraines    Breast Cancer Other     Anesthesia Reaction No family hx of     Crohn's Disease No family hx of     Ulcerative Colitis No family hx of        SOCIAL HISTORY:  Social History     Socioeconomic History    Marital status:      Spouse name: Robbin Thompson    Number of children: 4    Years of education: 20    Highest education level: None   Occupational History    Occupation: Guardian Conservator      Employer: [employer]     Comment: social work     Employer: S[employer]   Tobacco Use    Smoking status: Former     Current packs/day: 0.00     Average packs/day: 1 pack/day for 18.0 years (18.0 ttl pk-yrs)     Types: Cigarettes     Start date: 1967     Quit date: 1985     Years since quittin.3    Smokeless tobacco: Never   Vaping Use    Vaping status: Never Used   Substance and Sexual Activity    Alcohol use: Yes     Alcohol/week: 0.0 standard drinks of alcohol     Comment: rare - \"I toast at weddings\"    Drug use: No    Sexual activity: Yes     Partners: Male     Birth control/protection: Post-menopausal   Other Topics Concern    Exercise Yes     Comment: cardio and strengthing   Social History Narrative    She is retired. [place], [place], corn and soybean crops.     Social Determinants of Health     Financial Resource Strain: Low Risk  (2023)    Received from [facility]    Overall Financial Resource Strain (CARDIA)     Difficulty of Paying Living Expenses: Not hard at all   Food Insecurity: No Food Insecurity (2024)    Received from [facility]    Hunger Vital Sign     Worried About Running " "Out of Food in the Last Year: Never true     Ran Out of Food in the Last Year: Never true   Transportation Needs: No Transportation Needs (6/21/2024)    Received from [facility]    PRAPARE - Transportation     Lack of Transportation (Medical): No     Lack of Transportation (Non-Medical): No   Physical Activity: Insufficiently Active (11/9/2023)    Received from [facility]    Exercise Vital Sign     Days of Exercise per Week: 5 days     Minutes of Exercise per Session: 10 min   Stress: No Stress Concern Present (11/9/2023)    Received from [facility]    [facility] - Occupational Stress Questionnaire     Feeling of Stress : Only a little   Social Connections: Feeling Socially Integrated (12/20/2023)    Received from [facility]    OASIS : Social Isolation     Frequency of experiencing loneliness or isolation: Never   Interpersonal Safety: Not At Risk (6/21/2024)    Received from [facility]    Humiliation, Afraid, Rape, and Kick questionnaire     Fear of Current or Ex-Partner: No     Emotionally Abused: No     Physically Abused: No     Sexually Abused: No   Housing Stability: Low Risk  (6/21/2024)    Received from [facility]    Housing Stability Vital Sign     Unable to Pay for Housing in the Last Year: No     Number of Times Moved in the Last Year: 1     Homeless in the Last Year: No       Review of Systems:  Skin:          Eyes:         ENT:         Respiratory:  Positive for shortness of breath SOB with afib episodes   Cardiovascular:    Positive for;edema    Gastroenterology:        Genitourinary:         Musculoskeletal:         Neurologic:         Psychiatric:         Heme/Lymph/Imm:         Endocrine:           Physical Exam:  Vitals: /78 (BP Location: Right arm, Patient Position: Sitting, Cuff Size: Adult Regular)   Pulse 81   Ht 1.715 m (5' 7.5\")   Wt 78.5 kg (173 lb)   LMP 06/01/1988 (Approximate)   SpO2 98%   BMI 26.70 kg/m      Constitutional:  " cooperative, alert and oriented, well developed, well nourished, in no acute distress overweight In a wheelchair    Skin:  warm and dry to the touch, no apparent skin lesions or masses noted          Head:  normocephalic, no masses or lesions        Eyes:  pupils equal and round, conjunctivae and lids unremarkable, sclera white, no xanthalasma, EOMS intact, no nystagmus        Lymph:      ENT:  no pallor or cyanosis, dentition good        Neck:  no carotid bruit        Respiratory:  normal breath sounds, clear to auscultation, normal A-P diameter, normal symmetry, normal respiratory excursion, no use of accessory muscles         Cardiac: regular rhythm;no murmurs, gallops or rubs detected                pulses full and equal                                        GI:           Extremities and Muscular Skeletal:  no spinal abnormalities noted;normal muscle strength and tone   bilateral LE edema;trace          Neurological:  no gross motor deficits        Psych:  affect appropriate, oriented to time, person and place        CC  Gentry Ramirez MD  [address] [place] [address]

## 2024-08-23 NOTE — PATIENT INSTRUCTIONS
It was a pleasure seeing you today and thank you for allowing me to be a part of your health care team.  Should   you have any questions regarding your visit or future needs please feel free to reach out to my care team for assistance.      Thank you, Dr. Kirill Zuluaga        **Nursing: [phone]       **Scheduling: [phone]

## 2024-08-27 ENCOUNTER — TELEPHONE (OUTPATIENT)
Dept: TRANSPLANT | Facility: CLINIC | Age: 74
End: 2024-08-27
Payer: MEDICARE

## 2024-08-28 NOTE — CONFIDENTIAL NOTE
RECORDS RECEIVED FROM: internal    DATE RECEIVED:  9.9.24    NOTES (FOR ALL VISITS) STATUS DETAILS   OFFICE NOTES from referring provider internal    Gentry Ramirez MD      OFFICE NOTES from other specialist internal  8.23.24 [facility] - 7.5.23 [facility]    MEDICATION LIST internal     IMAGING      XR (Chest) ce [facility]- 6.19.24, 4.9.24, 11.24.23, 9.4.23   LABS     DIABETES: HBGA1C, CREATININE, FASTING LIPIDS, MICROALBUMIN URINE, POTASSIUM, TSH, T4    THYROID: TSH, T4, CBC, THYRODLONULIN, TOTAL T3, FREE T4, CALCITONIN, CEA internal /ce  Cbc- 6.22.24  Cmp- 6.22.24  Glucose - 6.22.24  Bmp- 6.19.24  CREATININE- 4.11.24  POTASSIUM- 4.10.24  HBGA1C- 4.9.24

## 2024-09-03 ENCOUNTER — MYC MEDICAL ADVICE (OUTPATIENT)
Dept: CARDIOLOGY | Facility: CLINIC | Age: 74
End: 2024-09-03
Payer: MEDICARE

## 2024-09-03 DIAGNOSIS — E11.22 TYPE 2 DIABETES MELLITUS WITH STAGE 3 CHRONIC KIDNEY DISEASE, WITHOUT LONG-TERM CURRENT USE OF INSULIN (H): ICD-10-CM

## 2024-09-03 DIAGNOSIS — E78.6 HDL DEFICIENCY: ICD-10-CM

## 2024-09-03 DIAGNOSIS — Z86.73 H/O: CVA (CEREBROVASCULAR ACCIDENT): ICD-10-CM

## 2024-09-03 DIAGNOSIS — E78.1 HYPERTRIGLYCERIDEMIA: ICD-10-CM

## 2024-09-03 DIAGNOSIS — E78.5 HYPERLIPIDEMIA LDL GOAL <70: ICD-10-CM

## 2024-09-03 DIAGNOSIS — N18.30 TYPE 2 DIABETES MELLITUS WITH STAGE 3 CHRONIC KIDNEY DISEASE, WITHOUT LONG-TERM CURRENT USE OF INSULIN (H): ICD-10-CM

## 2024-09-03 DIAGNOSIS — Z78.9 STATIN INTOLERANCE: ICD-10-CM

## 2024-09-03 RX ORDER — EVOLOCUMAB 140 MG/ML
140 INJECTION, SOLUTION SUBCUTANEOUS
Qty: 6 ML | Refills: 3 | Status: SHIPPED | OUTPATIENT
Start: 2024-09-03

## 2024-09-03 NOTE — TELEPHONE ENCOUNTER
Patient's pharmacy called to discuss. Per pharmacist, a Dr. Philippe sent a prescription for Repatha 140 mg/ml prefilled syringes to be administered every 14 days. Patient called and reports Dr. hPilippe is her Cardiologist in [place], however she has moved back to [place] and last saw Dr. Zuluaga on 8/23/24. Patient would like sureclick option ordered at [facility] where she has previously had it filled. Patient would like to know if she can be refunded from [facility] for unopened syringes. Instructed to call store directly to discuss.    Dose verified with pharmacist and patient. Refill for sureclick sent to [facility].

## 2024-09-04 ENCOUNTER — LAB (OUTPATIENT)
Dept: LAB | Facility: CLINIC | Age: 74
End: 2024-09-04
Payer: MEDICARE

## 2024-09-04 DIAGNOSIS — Z94.4 LIVER REPLACED BY TRANSPLANT (H): ICD-10-CM

## 2024-09-04 DIAGNOSIS — E78.00 HYPERCHOLESTEREMIA: ICD-10-CM

## 2024-09-04 LAB
ALBUMIN SERPL BCG-MCNC: 3.8 G/DL (ref 3.5–5.2)
ALP SERPL-CCNC: 100 U/L (ref 40–150)
ALT SERPL W P-5'-P-CCNC: 20 U/L (ref 0–50)
ANION GAP SERPL CALCULATED.3IONS-SCNC: 12 MMOL/L (ref 7–15)
AST SERPL W P-5'-P-CCNC: 23 U/L (ref 0–45)
BILIRUB DIRECT SERPL-MCNC: <0.2 MG/DL (ref 0–0.3)
BILIRUB SERPL-MCNC: 0.4 MG/DL
BUN SERPL-MCNC: 29.5 MG/DL (ref 8–23)
CALCIUM SERPL-MCNC: 10.1 MG/DL (ref 8.8–10.4)
CHLORIDE SERPL-SCNC: 105 MMOL/L (ref 98–107)
CHOLEST SERPL-MCNC: 291 MG/DL
CREAT SERPL-MCNC: 1.83 MG/DL (ref 0.51–0.95)
EGFRCR SERPLBLD CKD-EPI 2021: 28 ML/MIN/1.73M2
ERYTHROCYTE [DISTWIDTH] IN BLOOD BY AUTOMATED COUNT: 15.7 % (ref 10–15)
FASTING STATUS PATIENT QL REPORTED: YES
FASTING STATUS PATIENT QL REPORTED: YES
GLUCOSE SERPL-MCNC: 100 MG/DL (ref 70–99)
HCO3 SERPL-SCNC: 25 MMOL/L (ref 22–29)
HCT VFR BLD AUTO: 39 % (ref 35–47)
HDLC SERPL-MCNC: 72 MG/DL
HGB BLD-MCNC: 12.2 G/DL (ref 11.7–15.7)
LDLC SERPL CALC-MCNC: 153 MG/DL
MCH RBC QN AUTO: 29.9 PG (ref 26.5–33)
MCHC RBC AUTO-ENTMCNC: 31.3 G/DL (ref 31.5–36.5)
MCV RBC AUTO: 96 FL (ref 78–100)
NONHDLC SERPL-MCNC: 219 MG/DL
PLATELET # BLD AUTO: 324 10E3/UL (ref 150–450)
POTASSIUM SERPL-SCNC: 4.4 MMOL/L (ref 3.4–5.3)
PROT SERPL-MCNC: 6.7 G/DL (ref 6.4–8.3)
RBC # BLD AUTO: 4.08 10E6/UL (ref 3.8–5.2)
SIROLIMUS BLD-MCNC: 2.3 UG/L (ref 5–15)
SODIUM SERPL-SCNC: 142 MMOL/L (ref 135–145)
TME LAST DOSE: ABNORMAL H
TME LAST DOSE: ABNORMAL H
TRIGL SERPL-MCNC: 329 MG/DL
WBC # BLD AUTO: 10.3 10E3/UL (ref 4–11)

## 2024-09-04 PROCEDURE — 80195 ASSAY OF SIROLIMUS: CPT

## 2024-09-04 PROCEDURE — 80053 COMPREHEN METABOLIC PANEL: CPT

## 2024-09-04 PROCEDURE — 36415 COLL VENOUS BLD VENIPUNCTURE: CPT

## 2024-09-04 PROCEDURE — 85027 COMPLETE CBC AUTOMATED: CPT

## 2024-09-04 PROCEDURE — 82248 BILIRUBIN DIRECT: CPT

## 2024-09-04 PROCEDURE — 80061 LIPID PANEL: CPT

## 2024-09-05 ENCOUNTER — ENROLLMENT (OUTPATIENT)
Dept: HOME HEALTH SERVICES | Facility: HOME HEALTH | Age: 74
End: 2024-09-05
Payer: COMMERCIAL

## 2024-09-05 ENCOUNTER — ENROLLMENT (OUTPATIENT)
Dept: HOME HEALTH SERVICES | Facility: HOME HEALTH | Age: 74
End: 2024-09-05
Payer: MEDICARE

## 2024-09-05 ENCOUNTER — TELEPHONE (OUTPATIENT)
Dept: CARDIOLOGY | Facility: CLINIC | Age: 74
End: 2024-09-05
Payer: MEDICARE

## 2024-09-05 DIAGNOSIS — Z94.4 LIVER REPLACED BY TRANSPLANT (H): Primary | ICD-10-CM

## 2024-09-05 DIAGNOSIS — E78.5 HYPERLIPIDEMIA LDL GOAL <70: ICD-10-CM

## 2024-09-05 DIAGNOSIS — E78.1 HYPERTRIGLYCERIDEMIA: Primary | ICD-10-CM

## 2024-09-05 DIAGNOSIS — Z94.4 LIVER REPLACED BY TRANSPLANT (H): ICD-10-CM

## 2024-09-05 DIAGNOSIS — N18.30 TYPE 2 DIABETES MELLITUS WITH STAGE 3 CHRONIC KIDNEY DISEASE, WITHOUT LONG-TERM CURRENT USE OF INSULIN, UNSPECIFIED WHETHER STAGE 3A OR 3B CKD (H): ICD-10-CM

## 2024-09-05 DIAGNOSIS — E11.22 TYPE 2 DIABETES MELLITUS WITH STAGE 3 CHRONIC KIDNEY DISEASE, WITHOUT LONG-TERM CURRENT USE OF INSULIN, UNSPECIFIED WHETHER STAGE 3A OR 3B CKD (H): ICD-10-CM

## 2024-09-05 DIAGNOSIS — E78.6 HDL DEFICIENCY: ICD-10-CM

## 2024-09-05 DIAGNOSIS — Z78.9 STATIN INTOLERANCE: ICD-10-CM

## 2024-09-05 RX ORDER — SIROLIMUS 1 MG/1
1 TABLET, FILM COATED ORAL DAILY
Qty: 90 TABLET | Refills: 3 | Status: SHIPPED | OUTPATIENT
Start: 2024-09-05

## 2024-09-05 RX ORDER — PREDNISONE 10 MG/1
10 TABLET ORAL DAILY
Qty: 90 TABLET | Refills: 3 | Status: SHIPPED | OUTPATIENT
Start: 2024-09-05

## 2024-09-05 NOTE — TELEPHONE ENCOUNTER
ISSUE:   Sirolimus level 2.1 on 9/4, goal 5, dose 0.5  mg daily. Per lab  took 1pm and had drawn at 10 am. So not full 24hour trough    PLAN:   Call Patient and confirm this was an accurate 24-hour trough.   Verify Sirolimus dose 0.5 mg daily.   Confirm no new medications or or missed doses.   Confirm no new illness / infection / diarrhea.   If accurate trough and accurate dose, increase Sirolimus dose to 1 mg daily     Is this more than a 50% increase or decrease in current IS dose: Yes  If YES, justification:     Repeat labs in 1- weeks.  *If > 50% change in immunosuppression dose, repeat labs in 1 week.   Angelika Frausto RN      OUTCOME:   Spoke with Patient, they confirm accurate trough level and current dose 0.5 mg daily.   Patient confirmed dose change to 1 mg daily.  Patient agreed to repeat labs in 1-2 weeks.   Orders sent to preferred pharmacy for dose change and lab for repeat labs.   Patient voiced understanding of plan.     Maribel Sin LPN

## 2024-09-05 NOTE — TELEPHONE ENCOUNTER
FLP completed as below, ordered for reassessment of cholesterol. Patient is on repatha and zetia. Dr Zuluaga messaged to review.     FLP 11/2023 showed CHOL = 223, , HDL 47, unable to calculate LDL       Component      Latest Ref Rng 9/4/2024  10:08 AM   ALT      0 - 50 U/L 20    Bilirubin Direct      0.00 - 0.30 mg/dL <0.20    Cholesterol      <200 mg/dL 291 (H)    Triglycerides      <150 mg/dL 329 (H)    HDL Cholesterol      >=50 mg/dL 72    LDL Cholesterol Calculated      <=100 mg/dL 153 (H)    Non HDL Cholesterol      <130 mg/dL 219 (H)    Patient Fasting? Yes

## 2024-09-06 NOTE — TELEPHONE ENCOUNTER
Kirill Zuluaga MD  Gordo, Alyson BOO, RNJust now (1:49 PM)     Recheck in 3 months.       Spoke to patient, reviewed recommendations from Dr Zuluaga. She expressed understanding and was agreeable to plan. Scheduling number provided.     She asks if Dr Zuluaga would be willing to place a nutrition referral to help with dietary modifications for lowering her cholesterol. Provider messaged.

## 2024-09-06 NOTE — TELEPHONE ENCOUNTER
Kirill Zuluaga MD  You18 hours ago (4:28 PM)     Does not look like she is taking her Repatha.       Left a message for patient asking for a callback to confirm she is taking repatha/zetia as prescribed.

## 2024-09-06 NOTE — TELEPHONE ENCOUNTER
Kirill Zuluaga MD  You39 minutes ago (3:10 PM)     Nutrition consult  for cholesterol       Attempted to order nutrition referral, pop-up received noting that Medicare will not cover nutrition referrals ordered with any diagnosis other than diabetes. Called patient and reviewed this. At this time, she will discuss with her endocrinologist next week about placing a nutrition referral for diabetes, and hopefully they can discuss dietary management of cholesterol alongside that.

## 2024-09-06 NOTE — TELEPHONE ENCOUNTER
Patient called back. She states she brought her repatha from [place] and that provider's team ordered the syringes and not the sureclick. She has been struggling to convince her children to help her with the injections. She cannot do it alone due to her arthritis.    She state the last dose was a few days late. She will be back on track on Sunday. She has 6 more syringes to use up. She cannot afford to waste them as her copay is $400 for the 3 months.    Patient is otherwise compliant with the repatha and zetia. She just moved in in July and states that was a very stressful time. She has no other idea why her numbers are still so high.    Will message Dr. Zuluaga to review

## 2024-09-09 ENCOUNTER — OFFICE VISIT (OUTPATIENT)
Dept: ENDOCRINOLOGY | Facility: CLINIC | Age: 74
End: 2024-09-09
Attending: INTERNAL MEDICINE
Payer: MEDICARE

## 2024-09-09 ENCOUNTER — PRE VISIT (OUTPATIENT)
Dept: ENDOCRINOLOGY | Facility: CLINIC | Age: 74
End: 2024-09-09

## 2024-09-09 VITALS
DIASTOLIC BLOOD PRESSURE: 80 MMHG | SYSTOLIC BLOOD PRESSURE: 124 MMHG | BODY MASS INDEX: 26.7 KG/M2 | HEART RATE: 70 BPM | WEIGHT: 173 LBS | OXYGEN SATURATION: 96 %

## 2024-09-09 DIAGNOSIS — M81.8 OTHER OSTEOPOROSIS WITHOUT CURRENT PATHOLOGICAL FRACTURE: ICD-10-CM

## 2024-09-09 DIAGNOSIS — E89.0 POSTOPERATIVE HYPOTHYROIDISM: Primary | ICD-10-CM

## 2024-09-09 DIAGNOSIS — C73 PAPILLARY THYROID CARCINOMA (H): ICD-10-CM

## 2024-09-09 DIAGNOSIS — N18.32 STAGE 3B CHRONIC KIDNEY DISEASE (H): ICD-10-CM

## 2024-09-09 DIAGNOSIS — N18.32 TYPE 2 DIABETES MELLITUS WITH STAGE 3B CHRONIC KIDNEY DISEASE, WITHOUT LONG-TERM CURRENT USE OF INSULIN (H): ICD-10-CM

## 2024-09-09 DIAGNOSIS — E78.5 HYPERLIPIDEMIA LDL GOAL <70: ICD-10-CM

## 2024-09-09 DIAGNOSIS — E11.22 TYPE 2 DIABETES MELLITUS WITH STAGE 3B CHRONIC KIDNEY DISEASE, WITHOUT LONG-TERM CURRENT USE OF INSULIN (H): ICD-10-CM

## 2024-09-09 LAB — HBA1C MFR BLD: 6.9 %

## 2024-09-09 PROCEDURE — 83036 HEMOGLOBIN GLYCOSYLATED A1C: CPT | Performed by: PATHOLOGY

## 2024-09-09 PROCEDURE — 99205 OFFICE O/P NEW HI 60 MIN: CPT | Performed by: STUDENT IN AN ORGANIZED HEALTH CARE EDUCATION/TRAINING PROGRAM

## 2024-09-09 RX ORDER — LEVOTHYROXINE SODIUM 175 UG/1
175 TABLET ORAL DAILY
Qty: 90 TABLET | Refills: 3 | Status: SHIPPED | OUTPATIENT
Start: 2024-09-09

## 2024-09-09 RX ORDER — CALCITRIOL 0.25 UG/1
0.25 CAPSULE, LIQUID FILLED ORAL DAILY
Qty: 90 CAPSULE | Refills: 3 | Status: SHIPPED | OUTPATIENT
Start: 2024-09-09

## 2024-09-09 ASSESSMENT — PAIN SCALES - GENERAL: PAINLEVEL: NO PAIN (0)

## 2024-09-09 NOTE — PROGRESS NOTES
Endocrinology Clinic Visit 9/9/2024    NAME:  Luz Thompson  PCP:  No Ref-Primary, Physician  MRN:  [id number]  Reason for Consult: DM type II  Requesting Provider:  Gentry Ramirez    Chief Complaint     Chief Complaint   Patient presents with    Thyroid Problem    Diabetes       History of Present Illness     Luz Thompson is a 74 year old female who is seen in clinic for DM type II.  Has background history of paroxysmal A-fib, HTN, CKD stage IIIb, HLD, DM type II, CVA, DVT, Sjogren's syndrome, sensorineural hearing loss, osteoporosis, PTC, s/p total thyroidectomy hypothyroidism and primary biliary cirrhosis s/p liver transplant 2002 on immunosuppression sirolimus and prednisone 10 mg daily she was last seen in the endocrinology clinic in April 2020 by Dr. Vasquez she is moving back to [place] from [place] and re-establish care with endocrinology she is accompanied today with her daughter Flower.      History of PTC:  S/P total thyroidectomy and central neck dissection on March 2nd, 2010. Follicular variant, solitary, 3 cm in greatest diameter, with no capsular invasion and negative central neck lymph nodes (0/18 lymph nodes).  MACIS score 5.78.      The WBS done on 12/9/2010 revealed 3 vaque foci of uptake in the L neck. Total iodine uptake was 0.2%.   She underwent radioablation with 159.2 mCi I 131, on 1/27/2011. The WBS done 1 week post treatment revealed 3 foci of uptake in the L neck, which were previously seen on the pretreatment scan.      11/5/12 neck US - normal appearing lymph nodes levels 2 B/L and 3 LN at left level 3   6/7/12 posttreatment WBS - negative   6/25/13 neck US - didn't identify lymph nodes with features suspicious of malignancy. A right level II lymph node appeared to be mildly larger compared with prior images from 2010. Two left level III lymph nodes were again noted, of normal size.   5/19/14 and 6/14/2016 neck USs  - no definite suspicious looking lymph nodes      Thyroglobulin antibodies were negative.                       TG             TSH               free T4  5/5/10         0.21          TSH 7.48   10/25/10     <0.1          TSH 2.92  12/08/10      8.3             12/10/10      3.1           TSH 58.6   8/4/11         <0.1          TSH 0.03  6/8/12         <0.1          TSH 75.1  6/25/13       <0.1          TSH 12.8  3/5/14         <0.1          TSH 6.32   4/15/15       <0.1          TSH 2.4  5/24/16       <0.1          TSH 3.65   5/10/17       <0.1          TSH 4.74   7/13/17       <0.1          TSH 3.66   7/30/18:                        TSH 8.64  10/16/18:                      TSH 18.39  5/20/19                         TSH 12.11  9/22/2023                      TSH 4.2 (0.358-3.74)   Free T41.45  11/8/2023                       TSH 7.09                      Free T4 0.87    Images:  11/5/12 neck US - normal appearing lymph nodes levels 2 B/L and 3 LN at left level 3   6/7/12 posttreatment WBS - negative   6/25/13 neck US - didn't identify lymph nodes with features suspicious of malignancy. A right level II lymph node appeared to be mildly larger compared with prior images from 2010. Two left level III lymph nodes were again noted, of normal size.   5/19/14 and 6/14/2016 neck USs  - no definite suspicious looking lymph nodes    On assessment today :   Stated she started to feel lump at the lower mandible , no dysphagia , no dyspnea, no hoarseness of voice.     Levothyroxine dose:  Was fluctuating between 150 mcg and 175 mcg for 8 years most recent changes:  4/1/2021 dose was increased from 150 mcg to 175 mcg.  6/6/2023 dose was increased from 175 mcg to 200 mcg.  She stated she takes it inconsistently last couple of years, she stated she forget more than she remembers she takes it roughly two times/week.   She has pill box and she fills it every week but she dose not remember to take it before eating in the morning.       Post operative hypocalcemia:  PTH level  recovered however continued to have intermittent hypocalcemia most recent PTH was 87 in January 2022.  Started on calcitriol 0.5 mcg no recurrence of hypocalcemia since 2019 most recent calcium level 10.1 on 9/4/2024.  She stated she is compliant with it, no missed doses.     DM type II:  Diagnosed in May 2018.  On prednisone 10 mg for immunosuppression following liver transplant.  On Tradjenta 5 mg daily, hemoglobin A1c 6.9% in April 2024.  A1c today is 6.9%.  She has phil 2 she does not scan it frequently phil report today:  Time CGM active 27%  Average glucose 149  GV 26%  TIR 75%  High 25%  Very high 0%  Low 0%  Very low 0%      Diabetes related complication screening:  Nephropathy: CKD stage IV eGFR 28  HLD/history of CVA: Follow-up with cardiology for mixed hyperlipidemia on ezetimibe and evolocumab  Neuropathy: yes   Retinopathy: last appointment 8/2024 no retinopathy has macular degeneration     History of osteoporosis:  History of fracture elbow at age of 31.  Received Fosamax for 1 year 20 years ago.  After that she stopped using it was concerned about potential osteonecrosis of the jaw.  Most recent DEXA scan August 2019 [facility] lowest T-score of -3 at left forearm, at right femoral neck was -2.6,, left femoral neck -2.1% lumbar spine -0.1.  No significant interval loss of BMD in comparison to 2015 DEXA scan.  She has all her natural teeth  removed she has full denture.  Takes multiple vitamin preservation radha 2 caps twice daily but does not know how much vitamin D and calcium on it.   Eats cheese , almond milk occasionally , ice-cream 5 times /week.         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    Prophylactic antibiotic    High risk medication use    Statin  intolerance    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    Sensorineural hearing loss (SNHL) of both ears    Abnormal liver function tests    Liver replaced by transplant (H)    Hyperlipidemia LDL goal <70    Hypertension goal BP (blood pressure) < 140/80    Postoperative hypothyroidism    Type 2 diabetes mellitus with stage 3b chronic kidney disease, without long-term current use of insulin (H)    Age-related osteoporosis without current pathological fracture    Tympanic membrane perforation, left    Other infective chronic otitis externa of left ear    Stage 3b chronic kidney disease (H)    Escherichia coli sepsis (H)    Physical deconditioning    Immunosuppression (H24)    Papillary thyroid carcinoma (H)    PAF (paroxysmal atrial fibrillation) (H)        Medications     Current Outpatient Medications   Medication Sig Dispense Refill    acetaminophen (TYLENOL) 325 MG tablet Take 2 tablets (650 mg) by mouth every 6 hours as needed for mild pain or fever      apixaban ANTICOAGULANT (ELIQUIS ANTICOAGULANT) 2.5 MG tablet Take 1 tablet (2.5 mg) by mouth 2 times daily 180 tablet 3    azelastine (ASTELIN) 0.1 % nasal spray azelastine 137 mcg (0.1 %) nasal spray aerosol   Spray 1 spray every day by nasal route for 90 days.      calcitRIOL (ROCALTROL) 0.25 MCG capsule Take 1 capsule (0.25 mcg) by mouth daily. 90 capsule 3    COMPOUNDED NON-CONTROLLED SUBSTANCE (CMPD RX) - PHARMACY TO MIX COMPOUNDED MEDICATION Twice a day x7 days 14 capsule 0    Continuous Glucose Sensor (FREESTYLE NINO 2 SENSOR) MISC AS DIRECTED REPLACE EVERY 14 DAYS      estradiol (ESTRACE) 0.1 MG/GM vaginal cream Place 2 g vaginally twice a week      evolocumab (REPATHA SURECLICK) 140 MG/ML prefilled autoinjector Inject 1 mL (140 mg) subcutaneously every 14 days. . Please have fasting labs drawn for further refills. 6 mL 3    ezetimibe (ZETIA) 10 MG tablet TAKE 1 TABLET EVERY DAY 90 tablet 3    Ferrous Sulfate 324 MG TBEC Take 1 tablet twice a day by oral  route.      folic acid (FOLVITE) 1 MG tablet Take 1 tablet (1 mg) by mouth daily 100 tablet 0    furosemide (LASIX) 20 MG tablet Take 1 tablet (20 mg) by mouth daily 90 tablet 3    gabapentin (NEURONTIN) 250 MG/5ML solution Take 6 mLs (300 mg) by mouth at bedtime 180 mL 0    glucose (BD GLUCOSE) 5 g chewable tablet Take 2 tablets (10 g) by mouth as needed (low blood sugar) 40 tablet 1    hydrALAZINE (APRESOLINE) 25 MG tablet Take 1 tablet (25 mg) by mouth as needed (systolic bp > 160).      hypromellose (ARTIFICIAL TEARS) 0.4 % SOLN ophthalmic solution Apply 1 drop to eye every hour as needed for dry eyes      levothyroxine (SYNTHROID/LEVOTHROID) 175 MCG tablet Take 1 tablet (175 mcg) by mouth daily. 90 tablet 3    linagliptin (TRADJENTA) 5 MG TABS tablet Take 1 tablet (5 mg) by mouth daily 90 tablet 1    meclizine (ANTIVERT) 25 MG tablet Take 1 tablet (25 mg) by mouth as needed for dizziness or other (migraines) 30 tablet 11    metoprolol succinate ER (TOPROL XL) 25 MG 24 hr tablet       Multiple Vitamins-Minerals (PRESERVISION AREDS 2) CAPS Take 1 capsule by mouth 2 times daily      omega-3 acid ethyl esters (LOVAZA) 1 g capsule Take 1 capsule by mouth 2 times daily 180 capsule 1    predniSONE (DELTASONE) 10 MG tablet Take 1 tablet (10 mg) by mouth daily. 90 tablet 3    sirolimus (GENERIC EQUIVALENT) 1 MG tablet Take 1 tablet (1 mg) by mouth daily. 90 tablet 3    sodium chloride 0.65 % nasal spray Spray 2 sprays in nostril      SUMAtriptan (IMITREX) 50 MG tablet Take 1 tablet (50 mg) by mouth at onset of headache for migraine repeat after 2 hours if needed. 30 tablet 3    ursodiol (ACTIGALL) 250 MG tablet Take 1 tablet (250 mg) by mouth 2 times daily 180 tablet 3    ferrous gluconate (FERGON) 324 (38 Fe) MG tablet Take 1 tablet (324 mg) by mouth 3 times daily (with meals) 90 tablet 0    sulfamethoxazole-trimethoprim (BACTRIM) 400-80 MG tablet Take 1 tablet by mouth 2 times daily 14 tablet 0     No current  facility-administered medications for this visit.        Allergies     Allergies   Allergen Reactions    Fluconazole Hives and Itching     Full body hives      Mycophenolate Diarrhea and Nausea and Vomiting     Patient stated it was chronic and lasted months      Penicillins Rash, Anaphylaxis, Hives and Itching    Simvastatin Muscle Pain (Myalgia)     severe  Other reaction(s): Myalgia caused by statin    Methotrexate Other (See Comments)     Other reaction(s): Sore  Sores in mouth, esophagus, and stomach.       Morphine And Codeine Itching and Other (See Comments)     Psych disturbance  Other reaction(s): Confusion, Mood alteration    Quinolones Anxiety, Dizziness, Headache, Other (See Comments), Palpitations and Unknown     Other reaction(s): Hyperactive behavior, Lightheadedness, Mood alteration    Dizzy, light headed    Dizziness, shaky, and jumpy    Benadryl [Diphenhydramine Hcl]      Insomnia     Capsules, Empty Gelatin [Gelatin]     Cefdinir Itching    Cellcept Diarrhea    Ciprofloxacin Other (See Comments)     Insomnia, mood lability, Irregular heart beat, anxiety    Ciprofloxacin Hcl Dizziness and Other (See Comments)    Lansoprazole Diarrhea    Levaquin [Levofloxacin] Other (See Comments)     Headache, hyperactivity    Methotrexate      Sores    Morphine Sulfate Itching    Azithromycin Itching    Cefpodoxime Itching    Cephalexin Rash and Itching     Fever and skin burning  Other reaction(s): Fever, Skin burning    Diphenhydramine Other (See Comments)    Doxycycline Itching and Unknown    Lisinopril Cough    Omeprazole Itching    Other Drug Allergy (See Comments) Hives, Unknown and Rash    Penicillin G Itching and Rash    Tolectin [Nsaids] Rash    Tolmetin Rash and Itching    Tramadol Rash, Hives and Itching       Medical / Surgical History     Past Medical History:   Diagnosis Date    Anemia of other chronic disease 10/17/2011    Anxiety     Bladder infection, chronic 04/04/2012    CKD (chronic kidney  "disease) stage 3, GFR 30-59 ml/min (H) 04/04/2012    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     Diverticulosis of sigmoid colon 12/21/2013    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     Received Rituxan during Summer of 2016    H/O esophageal varices     Hearing loss     Heart murmur 04/04/2012    History of DVT (deep vein thrombosis)     History of [facility] fever     History of thrombophlebitis     History of thyroid cancer 09/25/2012    Hyperlipidemia 04/10/2012    Says that she does not have it anymore, not on meds    Hyperlipidemia LDL goal <70     Hypertension goal BP (blood pressure) < 140/80 11/06/2013    Hypertriglyceridemia     Liver replaced by transplant (H) 10/17/2011    Dr. 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 08/15/2013     Past Surgical History:   Procedure Laterality Date    APPENDECTOMY  1961    BIOPSY      CATARACT IOL, RT/LT      RE12/19/2013, LE12/10/2013 - Toric lenses    CHOLECYSTECTOMY  1991    COLONOSCOPY  03/10/2014    Procedure: COLONOSCOPY;;  Surgeon: Gentry Ramirez MD;  Location:  GI    CYSTOSCOPY      ear drum repair      ENDOBRONCHIAL ULTRASOUND FLEXIBLE N/A 09/29/2017    Procedure: ENDOBRONCHIAL ULTRASOUND FLEXIBLE;  Flexible Bronchoscopy, Endobronchial Ultrasound, Transbronchial Needle Aspiration ;  Surgeon: Eden Clinton MD;  " "Location: [facility] OR    ENDOSCOPIC RETROGRADE CHOLANGIOPANCREATOGRAM  2013    Procedure: ENDOSCOPIC RETROGRADE CHOLANGIOPANCREATOGRAM;  Endoscopic Retrograde Cholangiopancreatogram with single balloon enteroscopy, ballon sweep of bile duct;  Surgeon: Brett Membreno MD;  Location: [facility] OR    HC KNEE SCOPE,MED/LAT MENISECTOMY  08/10/2012    Right, partial medial menisectomy only    KNEE SURGERY  1966    R knee    PICC INSERTION  2013    4fr SL PASV PICC, 40cm (1cm external) in the R basilic vein w/ tip in the low SVC    PICC INSERTION  2014    5 fr DL BioFlo Navilyst PICC, 46 cm (3 cm external) in the L basilic vein w/ tip in the SVC RA junction.    THYROIDECTOMY  2010    TRANSPLANT LIVER RECIPIENT LIVING UNRELATED  2002       Social History     Social History     Socioeconomic History    Marital status:      Spouse name: Robbin Thompson    Number of children: 4    Years of education: 20    Highest education level: Not on file   Occupational History    Occupation: Guardian Conservator      Employer: [employer]     Comment: social work     Employer: [employer]   Tobacco Use    Smoking status: Former     Current packs/day: 0.00     Average packs/day: 1 pack/day for 18.0 years (18.0 ttl pk-yrs)     Types: Cigarettes     Start date: 1967     Quit date: 1985     Years since quittin.4    Smokeless tobacco: Never   Vaping Use    Vaping status: Never Used   Substance and Sexual Activity    Alcohol use: Yes     Alcohol/week: 0.0 standard drinks of alcohol     Comment: rare - \"I toast at weddings\"    Drug use: No    Sexual activity: Yes     Partners: Male     Birth control/protection: Post-menopausal   Other Topics Concern    Parent/sibling w/ CABG, MI or angioplasty before 65F 55M? Not Asked     Service Not Asked    Blood Transfusions Not Asked    Caffeine Concern Not Asked    Occupational Exposure Not Asked    Hobby Hazards Not Asked    Sleep Concern Not Asked    " Stress Concern Not Asked    Weight Concern Not Asked    Special Diet Not Asked    Back Care Not Asked    Exercise Yes     Comment: cardio and strengthing    Bike Helmet Not Asked    Seat Belt Not Asked    Self-Exams Not Asked   Social History Narrative    She is retired. [place]. 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     Social Determinants of Health     Financial Resource Strain: Low Risk  (11/9/2023)    Received from [facility]    Overall Financial Resource Strain (CARDIA)     Difficulty of Paying Living Expenses: Not hard at all   Food Insecurity: No Food Insecurity (6/21/2024)    Received from [facility]    Hunger Vital Sign     Worried About Running Out of Food in the Last Year: Never true     Ran Out of Food in the Last Year: Never true   Transportation Needs: No Transportation Needs (6/21/2024)    Received from [facility]    PRAPARE - Transportation     Lack of Transportation (Medical): No     Lack of Transportation (Non-Medical): No   Physical Activity: Insufficiently Active (11/9/2023)    Received from [facility]    Exercise Vital Sign     Days of Exercise per Week: 5 days     Minutes of Exercise per Session: 10 min   Stress: No Stress Concern Present (11/9/2023)    Received from [facility]    [place] [facility] [employer] - Occupational Stress Questionnaire     Feeling of Stress : Only a little   Social Connections: Feeling Socially Integrated (12/20/2023)    Received from [facility]    OASIS : Social Isolation     Frequency of experiencing loneliness or isolation: Never   Interpersonal Safety: Not At Risk (6/21/2024)    Received from [facility]    Humiliation, Afraid, Rape, and Kick questionnaire     Fear of Current or Ex-Partner: No     Emotionally Abused: No     Physically Abused: No     Sexually Abused: No   Housing Stability: Low Risk  (6/21/2024)     Received from [facility]    Housing Stability Vital Sign     Unable to Pay for Housing in the Last Year: No     Number of Times Moved in the Last Year: 1     Homeless in the Last Year: No       Family History     Family History   Problem Relation Age of Onset    Hypertension Mother     Endometrial Cancer Mother     Hyperlipidemia Mother     Prostate Cancer Father     Macular Degeneration Father     Cancer - colorectal Maternal Grandmother         in her 80's, has surgery and removal of part of kidney,  at age 98    Heart Disease Maternal Grandfather          at 98    Glaucoma Maternal Grandfather     Cerebrovascular Disease Paternal Grandmother         in her 80's    Hypertension Paternal Grandmother     Heart Disease Paternal Grandfather         MI    Alzheimer Disease Paternal Grandfather     Allergies Son     Neurologic Disorder Daughter         Migraines    Breast Cancer Other     Anesthesia Reaction No family hx of     Crohn's Disease No family hx of     Ulcerative Colitis No family hx of        ROS     12 ROS completed, pertinent positive and negative in HPI    Physical Exam   /80   Pulse 70   Wt 78.5 kg (173 lb)   LMP 1988 (Approximate)   SpO2 96%   BMI 26.70 kg/m       General: Comfortable, no obvious distress, normal body habitus  Eyes: Sclera anicteric, moist conjunctiva  HENT: Atraumatic,   CV: normal rate.   Resp:  good effort, no evidence of loud wheezing   Skin: No rashes, lesions, or subcutaneous nodules on exposed skin.   Psych: Alert and oriented x 3. Appropriate affect, good insight  Extremities: No peripheral edema  Neuro: Moves all four extremities. No focal deficits on limited exam.     Labs/Imaging     Pertinent Labs were reviewed and discussed briefly.  Radiology Results were  reviewed and discussed briefly.    Summary of recent findings:   Lab Results   Component Value Date    A1C 6.4 10/16/2018    A1C 6.8 2018       TSH   Date Value Ref Range Status    05/20/2019 12.11 (H) 0.40 - 4.00 mU/L Final   10/16/2018 18.39 (H) 0.40 - 4.00 mU/L Final   07/30/2018 8.64 (H) 0.40 - 4.00 mU/L Final   07/13/2017 3.66 0.40 - 4.00 mU/L Final   05/10/2017 4.74 (H) 0.40 - 4.00 mU/L Final     T4 Total   Date Value Ref Range Status   09/23/2014 9.1 5.0 - 11.0 ug/dL Final   05/08/2014 9.1 5.0 - 11.0 ug/dL Final   05/14/2013 8.4 5.0 - 11.0 ug/dL Final     T4 Free   Date Value Ref Range Status   05/20/2019 0.99 0.76 - 1.46 ng/dL Final   10/16/2018 0.85 0.76 - 1.46 ng/dL Final   07/30/2018 1.02 0.76 - 1.46 ng/dL Final   07/13/2017 1.59 (H) 0.76 - 1.46 ng/dL Final   05/10/2017 1.48 (H) 0.76 - 1.46 ng/dL Final       Creatinine   Date Value Ref Range Status   09/04/2024 1.83 (H) 0.51 - 0.95 mg/dL Final   09/18/2020 1.3 mg/dL Final       Recent Labs   Lab Test 09/04/24  1008 09/18/20  0000 05/20/19  0957 12/28/18  0000   CHOL 291* 197   < > 225*   HDL 72 42   < > 47   * 78   < > 110   TRIG 329* 432   < > 339*   CHOLHDLRATIO  --   --   --  4.8*    < > = values in this interval not displayed.     EXAM: Ultrasound soft tissue neck on 6/14/2016     COMPARISON: 5/19/2014     HISTORY: Malignant neoplasm of thyroid gland.     FINDINGS:     Lymph nodes are measured bilaterally with measurements as follows:     Right:  Level 1: No lymph node demonstrated.  Level 2: Lymph node with a fatty hilum and no hypervascularity  measuring 1.5 x 0.5 x 1 cm (previously 1.4 x 0.6 x 0.9 cm).  Level 3: Lymph node with no hypervascularity measuring 0.4 x 0.3 x 0.4  cm.  Level 4: Lymph node with a fatty hilum and no hypervascularity  measuring 0.8 x 0.3 x 0.7 cm.  Level 5: No lymph node demonstrated.  Level 6: No lymph node demonstrated.     Left:  Level 1: No lymph node demonstrated.  Level 2: Lymph node with a fatty hilum and no hypervascularity  measuring 1.1 x 0.9 x 0.3 cm (previously 1 x 0.7 x 0.3 cm).  Level 3: Lymph node with a fatty hilum and no hypervascularity  measuring 0.5 x 0.2 x 0.6 cm.  Level  "4: No lymph node demonstrated.  Level 5: No lymph node demonstrated.  Level 6: No lymph node demonstrated.                                                                      IMPRESSION:  No evident cervical adenopathy.         DEXA bone scan on 8/23/2019  COMPARISON:   4/30/2015     [age]  years[age]  Height: 67 inches  Weight: 186 pounds  Sex: Female  Ethnicity: White     Image quality: Adequate     Lumbar spine T-score in region of L1-L4 = -0.1   L1-4 percent change: 12.4%      HIPS:  Mean total hip T-score: -2.5  Mean total hip percent change: Not significant%      Left femoral neck T-score = -2.1  Right femoral neck T-score= -2.6      Radius 33% T-score = -3  Radius 33% percent change: Nonspecific%      FRAX:  10 year probability of major osteoporotic fracture: 24.5%  10 year probability of hip fracture: 8.9%  The 10 year probability of fracture may be lower than reported if the  patient has received treatment. FRAX data should be disregarded in  patient's taking bisphosphonates.     World Health Organization definition of osteoporosis and osteopenia  for  women:   Normal: T-score at or above -1.0  Low Bone Mass (Osteopenia): T-score between -1.0 and -2.5.   Osteoporosis: T-score at or below -2.5   T-scores are reported for postmenopausal women and men over 50 years  of age.                                                                      IMPRESSION:  Osteoporosis.  No results found for: \"[id number]\", \"[id number]\", \"[id number]\"    I personally reviewed the patient's outside records from [facility] EMR and Care Everywhere. Summary of pertinent findings in HPI.    Impression / Plan     1.  DM type II:  2.  CKD stage IIIb/IV:  On Tradjenta 5 mg daily.  A1c 6.9% within the goal  EGFR for the last year has been fluctuating between 16-36 most recent EGFR was 28 on 9/4/24    Plan:  -Continue Tradjenta 5 mg daily.  -She used to follow with nephrology in [place] referral was placed for reestablishing care with " nephrology    3.  History of PTC  4.  Postsurgical hypothyroidism:  S/p total thyroidectomy + I-131 ablation for intermediate risk PTC follicular variant.  Following that she had excellent biochemical and structural response to the most recent assessment 2016/2017 with the plan to hold on further surveillance however she became noncompliant with levothyroxine her TSH was elevated intermittently in the past however became consistently elevated since the last year her dose of levothyroxine was increased up to 200 mcg daily however she stated she takes it only couple of times per week.    Given elevated TSH for long time we will repeat the surveillance this year.  Will reduce the dose of levothyroxine to 175 mcg given the increasing the dose was done while she was noncompliant with taking the medication her weight-based dose is around 125 mcg.    Plan:  -To start to take levothyroxine 175 mcg daily she was advised to continue using her pillbox and if she misses a dose to take it in the following day.  She states that she will start to do that.  -To get TFTs after 2 months.  -TG/TG antibodies   -Ultrasound neck.    5.  History of hypocalcemia:  Developed postoperative hypocalcemia however PTH recovered most recent PTH was elevated.  No recurrence of hypocalcemia since August 2019 she remained on calcitriol 0.5 mcg daily most recent calcium level on 9/4/2024 was 10.1.  She developed worsening of the kidney function over the time most recent EGFR 28.    Plan:  -To reduce the dose of calcitriol from 0.5 mcg daily down to 0.25 mcg daily.    -To get repeat BMP after 2 months.    6.  History of osteoporosis:  On prednisone 10 mg daily for immunosuppression post liver transplant.  Most recent DEXA bone scan was in 2019 lowest T-score was -3 left forearm.  Received Fosamax for 1 year about 20 years ago.  No history of fragility fracture.  We discussed today considering starting Prolia after repeating DEXA bone scan if there is  worsening of the BMD we discussed today about using of Prolia and she was made aware that she cannot miss any doses after starting it we will attempt to treat for 2 and half year and get the Reclast following that if the kidney function at that time allow otherwise will be need to continue with Prolia.  She stated she will think about it and will discuss with her nephrologist and her hepatologist before making the final decision.    Plan:  -DEXA bone scan  -To get PTH/albumin/BMP/phosphorus/vitamin D with the next lab test.    Test and/or medications prescribed today:  Orders Placed This Encounter   Procedures    US Head Neck Soft Tissue    DX Bone Density    AFINION HEMOGLOBIN A1C POCT    TSH    T4 free    Thyroglobulin and Antibody (Sendout to [facility])    Parathyroid Hormone Intact    Albumin level    25 Hydroxyvitamin D2 and D3    Phosphorus    Basic metabolic panel    Ionized Calcium    Adult Nephrology  Referral         Follow up: 6 months      Rachel Masterson MD  Endocrinology, Diabetes and Metabolism  [facility]      60 minutes spent on the date of the encounter doing chart review, history and exam, documentation and further activities per the note

## 2024-09-09 NOTE — PATIENT INSTRUCTIONS
- To reduce the dose of Levothyroxine 175 mcg daily   - To take levothyroxine every morning and if you miss a dose to take two tablets in the following day , if you miss 2 doses to take two tablets in the following 2 days and so on  - To reduce the dose of calcitriol from 0.5 mg daily to 0.25 mg daily    - To get the lab tests after 2 months   - To get the Dexa bone scan in [facility]   - To get the Ultrasound done   - We referred you to the kidney doctor   - We will see you back in 6 months

## 2024-09-09 NOTE — LETTER
9/9/2024       RE: Luz Thompson  [address]  [facility] [address]     Dear Colleague,    Thank you for referring your patient, Luz Thompson, to the [facility] [place] [facility]. Please see a copy of my visit note below.    Patient is showing 3/5 MNCM met. Not on statin   Outcome for 09/05/24 1:37 PM: [employer] message sent  Shannon Dove CMA      Endocrinology Clinic Visit 9/9/2024    NAME:  Luz Thompson  PCP:  No Ref-Primary, Physician  MRN:  [id number]  Reason for Consult: DM type II  Requesting Provider:  Gentry Ramirez    Chief Complaint     Chief Complaint   Patient presents with     Thyroid Problem     Diabetes       History of Present Illness     Luz Thompson is a 74 year old female who is seen in clinic for DM type II.  Has background history of paroxysmal A-fib, HTN, CKD stage IIIb, HLD, DM type II, CVA, DVT, Sjogren's syndrome, sensorineural hearing loss, osteoporosis, PTC, s/p total thyroidectomy hypothyroidism and primary biliary cirrhosis s/p liver transplant 2002 on immunosuppression sirolimus and prednisone 10 mg daily she was last seen in the endocrinology clinic in April 2020 by Dr. Vasquez she is moving back to [place] from [place] and re-establish care with endocrinology she is accompanied today with her daughter Flower.      History of PTC:  S/P total thyroidectomy and central neck dissection on March 2nd, 2010. Follicular variant, solitary, 3 cm in greatest diameter, with no capsular invasion and negative central neck lymph nodes (0/18 lymph nodes).  MACIS score 5.78.      The WBS done on 12/9/2010 revealed 3 vaque foci of uptake in the L neck. Total iodine uptake was 0.2%.   She underwent radioablation with 159.2 mCi I 131, on 1/27/2011. The WBS done 1 week post treatment revealed 3 foci of uptake in the L neck, which were previously seen on the pretreatment scan.      11/5/12  neck US - normal appearing lymph nodes levels 2 B/L and 3 LN at left level 3   6/7/12 posttreatment WBS - negative   6/25/13 neck US - didn't identify lymph nodes with features suspicious of malignancy. A right level II lymph node appeared to be mildly larger compared with prior images from 2010. Two left level III lymph nodes were again noted, of normal size.   5/19/14 and 6/14/2016 neck USs  - no definite suspicious looking lymph nodes     Thyroglobulin antibodies were negative.                       TG             TSH               free T4  5/5/10         0.21          TSH 7.48   10/25/10     <0.1          TSH 2.92  12/08/10      8.3             12/10/10      3.1           TSH 58.6   8/4/11         <0.1          TSH 0.03  6/8/12         <0.1          TSH 75.1  6/25/13       <0.1          TSH 12.8  3/5/14         <0.1          TSH 6.32   4/15/15       <0.1          TSH 2.4  5/24/16       <0.1          TSH 3.65   5/10/17       <0.1          TSH 4.74   7/13/17       <0.1          TSH 3.66   7/30/18:                        TSH 8.64  10/16/18:                      TSH 18.39  5/20/19                         TSH 12.11  9/22/2023                      TSH 4.2 (0.358-3.74)   Free T41.45  11/8/2023                       TSH 7.09                      Free T4 0.87    Images:  11/5/12 neck US - normal appearing lymph nodes levels 2 B/L and 3 LN at left level 3   6/7/12 posttreatment WBS - negative   6/25/13 neck US - didn't identify lymph nodes with features suspicious of malignancy. A right level II lymph node appeared to be mildly larger compared with prior images from 2010. Two left level III lymph nodes were again noted, of normal size.   5/19/14 and 6/14/2016 neck USs  - no definite suspicious looking lymph nodes    On assessment today :   Stated she started to feel lump at the lower mandible , no dysphagia , no dyspnea, no hoarseness of voice.     Levothyroxine dose:  Was fluctuating between 150 mcg and 175 mcg  for 8 years most recent changes:  4/1/2021 dose was increased from 150 mcg to 175 mcg.  6/6/2023 dose was increased from 175 mcg to 200 mcg.  She stated she takes it inconsistently last couple of years, she stated she forget more than she remembers she takes it roughly two times/week.   She has pill box and she fills it every week but she dose not remember to take it before eating in the morning.       Post operative hypocalcemia:  PTH level recovered however continued to have intermittent hypocalcemia most recent PTH was 87 in January 2022.  Started on calcitriol 0.5 mcg no recurrence of hypocalcemia since 2019 most recent calcium level 10.1 on 9/4/2024.  She stated she is compliant with it, no missed doses.     DM type II:  Diagnosed in May 2018.  On prednisone 10 mg for immunosuppression following liver transplant.  On Tradjenta 5 mg daily, hemoglobin A1c 6.9% in April 2024.  A1c today is 6.9%.  She has phil 2 she does not scan it frequently phil report today:  Time CGM active 27%  Average glucose 149  GV 26%  TIR 75%  High 25%  Very high 0%  Low 0%  Very low 0%      Diabetes related complication screening:  Nephropathy: CKD stage IV eGFR 28  HLD/history of CVA: Follow-up with cardiology for mixed hyperlipidemia on ezetimibe and evolocumab  Neuropathy: yes   Retinopathy: last appointment 8/2024 no retinopathy has macular degeneration     History of osteoporosis:  History of fracture elbow at age of 31.  Received Fosamax for 1 year 20 years ago.  After that she stopped using it was concerned about potential osteonecrosis of the jaw.  Most recent DEXA scan August 2019 [facility] lowest T-score of -3 at left forearm, at right femoral neck was -2.6,, left femoral neck -2.1% lumbar spine -0.1.  No significant interval loss of BMD in comparison to 2015 DEXA scan.  She has all her natural teeth  removed she has full denture.  Takes multiple vitamin preservation radha 2 caps twice daily but does not know how  much vitamin D and calcium on it.   Eats cheese , almond milk occasionally , ice-cream 5 times /week.         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     Prophylactic antibiotic     High risk medication use     Statin intolerance     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     Sensorineural hearing loss (SNHL) of both ears     Abnormal liver function tests     Liver replaced by transplant (H)     Hyperlipidemia LDL goal <70     Hypertension goal BP (blood pressure) < 140/80     Postoperative hypothyroidism     Type 2 diabetes mellitus with stage 3b chronic kidney disease, without long-term current use of insulin (H)     Age-related osteoporosis without current pathological fracture     Tympanic membrane perforation, left     Other infective chronic otitis externa of left ear     Stage 3b chronic kidney disease (H)     Escherichia coli sepsis (H)     Physical deconditioning     Immunosuppression (H24)     Papillary thyroid carcinoma (H)     PAF (paroxysmal atrial fibrillation) (H)        Medications     Current Outpatient Medications   Medication Sig Dispense Refill     acetaminophen (TYLENOL) 325 MG tablet Take 2 tablets (650 mg) by mouth every 6 hours as needed for mild pain or fever       apixaban ANTICOAGULANT (ELIQUIS ANTICOAGULANT) 2.5 MG tablet Take 1 tablet (2.5 mg) by mouth 2 times daily 180 tablet 3     azelastine (ASTELIN) 0.1 % nasal spray azelastine 137 mcg (0.1 %) nasal spray aerosol   Spray 1 spray every day by nasal route for 90 days.       calcitRIOL (ROCALTROL) 0.25 MCG capsule Take 1 capsule (0.25 mcg) by mouth daily. 90 capsule 3     COMPOUNDED NON-CONTROLLED SUBSTANCE (CMPD RX) - PHARMACY TO MIX COMPOUNDED MEDICATION Twice a day x7 days 14 capsule 0     Continuous Glucose  Sensor (FREESTYLE NINO 2 SENSOR) MISC AS DIRECTED REPLACE EVERY 14 DAYS       estradiol (ESTRACE) 0.1 MG/GM vaginal cream Place 2 g vaginally twice a week       evolocumab (REPATHA SURECLICK) 140 MG/ML prefilled autoinjector Inject 1 mL (140 mg) subcutaneously every 14 days. . Please have fasting labs drawn for further refills. 6 mL 3     ezetimibe (ZETIA) 10 MG tablet TAKE 1 TABLET EVERY DAY 90 tablet 3     Ferrous Sulfate 324 MG TBEC Take 1 tablet twice a day by oral route.       folic acid (FOLVITE) 1 MG tablet Take 1 tablet (1 mg) by mouth daily 100 tablet 0     furosemide (LASIX) 20 MG tablet Take 1 tablet (20 mg) by mouth daily 90 tablet 3     gabapentin (NEURONTIN) 250 MG/5ML solution Take 6 mLs (300 mg) by mouth at bedtime 180 mL 0     glucose (BD GLUCOSE) 5 g chewable tablet Take 2 tablets (10 g) by mouth as needed (low blood sugar) 40 tablet 1     hydrALAZINE (APRESOLINE) 25 MG tablet Take 1 tablet (25 mg) by mouth as needed (systolic bp > 160).       hypromellose (ARTIFICIAL TEARS) 0.4 % SOLN ophthalmic solution Apply 1 drop to eye every hour as needed for dry eyes       levothyroxine (SYNTHROID/LEVOTHROID) 175 MCG tablet Take 1 tablet (175 mcg) by mouth daily. 90 tablet 3     linagliptin (TRADJENTA) 5 MG TABS tablet Take 1 tablet (5 mg) by mouth daily 90 tablet 1     meclizine (ANTIVERT) 25 MG tablet Take 1 tablet (25 mg) by mouth as needed for dizziness or other (migraines) 30 tablet 11     metoprolol succinate ER (TOPROL XL) 25 MG 24 hr tablet        Multiple Vitamins-Minerals (PRESERVISION AREDS 2) CAPS Take 1 capsule by mouth 2 times daily       omega-3 acid ethyl esters (LOVAZA) 1 g capsule Take 1 capsule by mouth 2 times daily 180 capsule 1     predniSONE (DELTASONE) 10 MG tablet Take 1 tablet (10 mg) by mouth daily. 90 tablet 3     sirolimus (GENERIC EQUIVALENT) 1 MG tablet Take 1 tablet (1 mg) by mouth daily. 90 tablet 3     sodium chloride 0.65 % nasal spray Spray 2 sprays in nostril        SUMAtriptan (IMITREX) 50 MG tablet Take 1 tablet (50 mg) by mouth at onset of headache for migraine repeat after 2 hours if needed. 30 tablet 3     ursodiol (ACTIGALL) 250 MG tablet Take 1 tablet (250 mg) by mouth 2 times daily 180 tablet 3     ferrous gluconate (FERGON) 324 (38 Fe) MG tablet Take 1 tablet (324 mg) by mouth 3 times daily (with meals) 90 tablet 0     sulfamethoxazole-trimethoprim (BACTRIM) 400-80 MG tablet Take 1 tablet by mouth 2 times daily 14 tablet 0     No current facility-administered medications for this visit.        Allergies     Allergies   Allergen Reactions     Fluconazole Hives and Itching     Full body hives       Mycophenolate Diarrhea and Nausea and Vomiting     Patient stated it was chronic and lasted months       Penicillins Rash, Anaphylaxis, Hives and Itching     Simvastatin Muscle Pain (Myalgia)     severe  Other reaction(s): Myalgia caused by statin     Methotrexate Other (See Comments)     Other reaction(s): Sore  Sores in mouth, esophagus, and stomach.        Morphine And Codeine Itching and Other (See Comments)     Psych disturbance  Other reaction(s): Confusion, Mood alteration     Quinolones Anxiety, Dizziness, Headache, Other (See Comments), Palpitations and Unknown     Other reaction(s): Hyperactive behavior, Lightheadedness, Mood alteration    Dizzy, light headed    Dizziness, shaky, and jumpy     Benadryl [Diphenhydramine Hcl]      Insomnia      Capsules, Empty Gelatin [Gelatin]      Cefdinir Itching     Cellcept Diarrhea     Ciprofloxacin Other (See Comments)     Insomnia, mood lability, Irregular heart beat, anxiety     Ciprofloxacin Hcl Dizziness and Other (See Comments)     Lansoprazole Diarrhea     Levaquin [Levofloxacin] Other (See Comments)     Headache, hyperactivity     Methotrexate      Sores     Morphine Sulfate Itching     Azithromycin Itching     Cefpodoxime Itching     Cephalexin Rash and Itching     Fever and skin burning  Other reaction(s): Fever, Skin  "burning     Diphenhydramine Other (See Comments)     Doxycycline Itching and Unknown     Lisinopril Cough     Omeprazole Itching     Other Drug Allergy (See Comments) Hives, Unknown and Rash     Penicillin G Itching and Rash     Tolectin [Nsaids] Rash     Tolmetin Rash and Itching     Tramadol Rash, Hives and Itching       Medical / Surgical History     Past Medical History:   Diagnosis Date     Anemia of other chronic disease 10/17/2011     Anxiety      Bladder infection, chronic 04/04/2012     CKD (chronic kidney disease) stage 3, GFR 30-59 ml/min (H) 04/04/2012     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      Diverticulosis of sigmoid colon 12/21/2013     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     Received Rituxan during Summer of 2016     H/O esophageal varices      Hearing loss      Heart murmur 04/04/2012     History of DVT (deep vein thrombosis)      History of [facility] fever      History of thrombophlebitis      History of thyroid cancer 09/25/2012     Hyperlipidemia 04/10/2012    Says that she does not have it anymore, not on meds     Hyperlipidemia LDL goal <70      Hypertension goal BP (blood pressure) < 140/80 11/06/2013     Hypertriglyceridemia      Liver replaced by transplant (H) 10/17/2011    Dr. 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 " 08/15/2013     Past Surgical History:   Procedure Laterality Date     APPENDECTOMY       BIOPSY       CATARACT IOL, RT/LT      RE2013, LE12/10/2013 - Toric lenses     CHOLECYSTECTOMY       COLONOSCOPY  03/10/2014    Procedure: COLONOSCOPY;;  Surgeon: Gentry Ramirez MD;  Location: [facility]     CYSTOSCOPY       ear drum repair       ENDOBRONCHIAL ULTRASOUND FLEXIBLE N/A 2017    Procedure: ENDOBRONCHIAL ULTRASOUND FLEXIBLE;  Flexible Bronchoscopy, Endobronchial Ultrasound, Transbronchial Needle Aspiration ;  Surgeon: Eden Clinton MD;  Location: [facility]     ENDOSCOPIC RETROGRADE CHOLANGIOPANCREATOGRAM  2013    Procedure: ENDOSCOPIC RETROGRADE CHOLANGIOPANCREATOGRAM;  Endoscopic Retrograde Cholangiopancreatogram with single balloon enteroscopy, ballon sweep of bile duct;  Surgeon: Brett Membreno MD;  Location: [facility] OR     HC KNEE SCOPE,MED/LAT MENISECTOMY  08/10/2012    Right, partial medial menisectomy only     KNEE SURGERY  1966    R knee     PICC INSERTION  2013    4fr SL PASV PICC, 40cm (1cm external) in the R basilic vein w/ tip in the low SVC     PICC INSERTION  2014    5 fr DL BioFlo Navilyst PICC, 46 cm (3 cm external) in the L basilic vein w/ tip in the SVC RA junction.     THYROIDECTOMY  2010     TRANSPLANT LIVER RECIPIENT LIVING UNRELATED  2002       Social History     Social History     Socioeconomic History     Marital status:      Spouse name: Robbin Thompson     Number of children: 4     Years of education: 20     Highest education level: Not on file   Occupational History     Occupation: Guardian Conservator      Employer: [employer]     Comment: social work     Employer: [employer]   Tobacco Use     Smoking status: Former     Current packs/day: 0.00     Average packs/day: 1 pack/day for 18.0 years (18.0 ttl pk-yrs)     Types: Cigarettes     Start date: 1967     Quit date: 1985     Years since quittin.4     Smokeless tobacco:  "Never   Vaping Use     Vaping status: Never Used   Substance and Sexual Activity     Alcohol use: Yes     Alcohol/week: 0.0 standard drinks of alcohol     Comment: rare - \"I toast at weddings\"     Drug use: No     Sexual activity: Yes     Partners: Male     Birth control/protection: Post-menopausal   Other Topics Concern     Parent/sibling w/ CABG, MI or angioplasty before 65F 55M? Not Asked      Service Not Asked     Blood Transfusions Not Asked     Caffeine Concern Not Asked     Occupational Exposure Not Asked     Hobby Hazards Not Asked     Sleep Concern Not Asked     Stress Concern Not Asked     Weight Concern Not Asked     Special Diet Not Asked     Back Care Not Asked     Exercise Yes     Comment: cardio and strengthing     Bike Helmet Not Asked     Seat Belt Not Asked     Self-Exams Not Asked   Social History Narrative    She is retired. [place]. 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     Social Determinants of Health     Financial Resource Strain: Low Risk  (11/9/2023)    Received from [employer]    Overall Financial Resource Strain (CARDIA)      Difficulty of Paying Living Expenses: Not hard at all   Food Insecurity: No Food Insecurity (6/21/2024)    Received from [employer]    Hunger Vital Sign      Worried About Running Out of Food in the Last Year: Never true      Ran Out of Food in the Last Year: Never true   Transportation Needs: No Transportation Needs (6/21/2024)    Received from [employer]    PRAPARE - Transportation      Lack of Transportation (Medical): No      Lack of Transportation (Non-Medical): No   Physical Activity: Insufficiently Active (11/9/2023)    Received from [facility]    Exercise Vital Sign      Days of Exercise per Week: 5 days      Minutes of Exercise per Session: 10 min   Stress: No Stress Concern Present (11/9/2023)    Received from [employer]    " [facility] Occupational [facility] - Occupational Stress Questionnaire      Feeling of Stress : Only a little   Social Connections: Feeling Socially Integrated (2023)    Received from [employer]    OASIS : Social Isolation      Frequency of experiencing loneliness or isolation: Never   Interpersonal Safety: Not At Risk (2024)    Received from [employer]    Humiliation, Afraid, Rape, and Kick questionnaire      Fear of Current or Ex-Partner: No      Emotionally Abused: No      Physically Abused: No      Sexually Abused: No   Housing Stability: Low Risk  (2024)    Received from [employer]    Housing Stability Vital Sign      Unable to Pay for Housing in the Last Year: No      Number of Times Moved in the Last Year: 1      Homeless in the Last Year: No       Family History     Family History   Problem Relation Age of Onset     Hypertension Mother      Endometrial Cancer Mother      Hyperlipidemia Mother      Prostate Cancer Father      Macular Degeneration Father      Cancer - colorectal Maternal Grandmother         in her 80's, has surgery and removal of part of kidney,  at age 98     Heart Disease Maternal Grandfather          at 98     Glaucoma Maternal Grandfather      Cerebrovascular Disease Paternal Grandmother         in her 80's     Hypertension Paternal Grandmother      Heart Disease Paternal Grandfather         MI     Alzheimer Disease Paternal Grandfather      Allergies Son      Neurologic Disorder Daughter         Migraines     Breast Cancer Other      Anesthesia Reaction No family hx of      Crohn's Disease No family hx of      Ulcerative Colitis No family hx of        ROS     12 ROS completed, pertinent positive and negative in HPI    Physical Exam   /80   Pulse 70   Wt 78.5 kg (173 lb)   LMP 1988 (Approximate)   SpO2 96%   BMI 26.70 kg/m       General: Comfortable, no obvious distress, normal body habitus  Eyes: Sclera anicteric, moist  conjunctiva  HENT: Atraumatic,   CV: normal rate.   Resp:  good effort, no evidence of loud wheezing   Skin: No rashes, lesions, or subcutaneous nodules on exposed skin.   Psych: Alert and oriented x 3. Appropriate affect, good insight  Extremities: No peripheral edema  Neuro: Moves all four extremities. No focal deficits on limited exam.     Labs/Imaging     Pertinent Labs were reviewed and discussed briefly.  Radiology Results were  reviewed and discussed briefly.    Summary of recent findings:   Lab Results   Component Value Date    A1C 6.4 10/16/2018    A1C 6.8 05/18/2018       TSH   Date Value Ref Range Status   05/20/2019 12.11 (H) 0.40 - 4.00 mU/L Final   10/16/2018 18.39 (H) 0.40 - 4.00 mU/L Final   07/30/2018 8.64 (H) 0.40 - 4.00 mU/L Final   07/13/2017 3.66 0.40 - 4.00 mU/L Final   05/10/2017 4.74 (H) 0.40 - 4.00 mU/L Final     T4 Total   Date Value Ref Range Status   09/23/2014 9.1 5.0 - 11.0 ug/dL Final   05/08/2014 9.1 5.0 - 11.0 ug/dL Final   05/14/2013 8.4 5.0 - 11.0 ug/dL Final     T4 Free   Date Value Ref Range Status   05/20/2019 0.99 0.76 - 1.46 ng/dL Final   10/16/2018 0.85 0.76 - 1.46 ng/dL Final   07/30/2018 1.02 0.76 - 1.46 ng/dL Final   07/13/2017 1.59 (H) 0.76 - 1.46 ng/dL Final   05/10/2017 1.48 (H) 0.76 - 1.46 ng/dL Final       Creatinine   Date Value Ref Range Status   09/04/2024 1.83 (H) 0.51 - 0.95 mg/dL Final   09/18/2020 1.3 mg/dL Final       Recent Labs   Lab Test 09/04/24  1008 09/18/20  0000 05/20/19  0957 12/28/18  0000   CHOL 291* 197   < > 225*   HDL 72 42   < > 47   * 78   < > 110   TRIG 329* 432   < > 339*   CHOLHDLRATIO  --   --   --  4.8*    < > = values in this interval not displayed.     EXAM: Ultrasound soft tissue neck on 6/14/2016     COMPARISON: 5/19/2014     HISTORY: Malignant neoplasm of thyroid gland.     FINDINGS:     Lymph nodes are measured bilaterally with measurements as follows:     Right:  Level 1: No lymph node demonstrated.  Level 2: Lymph node with  a fatty hilum and no hypervascularity  measuring 1.5 x 0.5 x 1 cm (previously 1.4 x 0.6 x 0.9 cm).  Level 3: Lymph node with no hypervascularity measuring 0.4 x 0.3 x 0.4  cm.  Level 4: Lymph node with a fatty hilum and no hypervascularity  measuring 0.8 x 0.3 x 0.7 cm.  Level 5: No lymph node demonstrated.  Level 6: No lymph node demonstrated.     Left:  Level 1: No lymph node demonstrated.  Level 2: Lymph node with a fatty hilum and no hypervascularity  measuring 1.1 x 0.9 x 0.3 cm (previously 1 x 0.7 x 0.3 cm).  Level 3: Lymph node with a fatty hilum and no hypervascularity  measuring 0.5 x 0.2 x 0.6 cm.  Level 4: No lymph node demonstrated.  Level 5: No lymph node demonstrated.  Level 6: No lymph node demonstrated.                                                                      IMPRESSION:  No evident cervical adenopathy.         DEXA bone scan on 8/23/2019  COMPARISON:   4/30/2015     [age]  years[age]  Height: 67 inches  Weight: 186 pounds  Sex: Female  Ethnicity: White     Image quality: Adequate     Lumbar spine T-score in region of L1-L4 = -0.1   L1-4 percent change: 12.4%      HIPS:  Mean total hip T-score: -2.5  Mean total hip percent change: Not significant%      Left femoral neck T-score = -2.1  Right femoral neck T-score= -2.6      Radius 33% T-score = -3  Radius 33% percent change: Nonspecific%      FRAX:  10 year probability of major osteoporotic fracture: 24.5%  10 year probability of hip fracture: 8.9%  The 10 year probability of fracture may be lower than reported if the  patient has received treatment. FRAX data should be disregarded in  patient's taking bisphosphonates.     World Health Organization definition of osteoporosis and osteopenia  for  women:   Normal: T-score at or above -1.0  Low Bone Mass (Osteopenia): T-score between -1.0 and -2.5.   Osteoporosis: T-score at or below -2.5   T-scores are reported for postmenopausal women and men over 50 years  of age.                   "                                                    IMPRESSION:  Osteoporosis.  No results found for: \"[id number]\", \"[id number]\", \"[id number]\"    I personally reviewed the patient's outside records from [facility] EMR and Care Everywhere. Summary of pertinent findings in HPI.    Impression / Plan     1.  DM type II:  2.  CKD stage IIIb/IV:  On Tradjenta 5 mg daily.  A1c 6.9% within the goal  EGFR for the last year has been fluctuating between 16-36 most recent EGFR was 28 on 9/4/24    Plan:  -Continue Tradjenta 5 mg daily.  -She used to follow with nephrology in [place] referral was placed for reestablishing care with nephrology    3.  History of PTC  4.  Postsurgical hypothyroidism:  S/p total thyroidectomy + I-131 ablation for intermediate risk PTC follicular variant.  Following that she had excellent biochemical and structural response to the most recent assessment 2016/2017 with the plan to hold on further surveillance however she became noncompliant with levothyroxine her TSH was elevated intermittently in the past however became consistently elevated since the last year her dose of levothyroxine was increased up to 200 mcg daily however she stated she takes it only couple of times per week.    Given elevated TSH for long time we will repeat the surveillance this year.  Will reduce the dose of levothyroxine to 175 mcg given the increasing the dose was done while she was noncompliant with taking the medication her weight-based dose is around 125 mcg.    Plan:  -To start to take levothyroxine 175 mcg daily she was advised to continue using her pillbox and if she misses a dose to take it in the following day.  She states that she will start to do that.  -To get TFTs after 2 months.  -TG/TG antibodies   -Ultrasound neck.    5.  History of hypocalcemia:  Developed postoperative hypocalcemia however PTH recovered most recent PTH was elevated.  No recurrence of hypocalcemia since August 2019 she remained on calcitriol 0.5 " mcg daily most recent calcium level on 9/4/2024 was 10.1.  She developed worsening of the kidney function over the time most recent EGFR 28.    Plan:  -To reduce the dose of calcitriol from 0.5 mcg daily down to 0.25 mcg daily.    -To get repeat BMP after 2 months.    6.  History of osteoporosis:  On prednisone 10 mg daily for immunosuppression post liver transplant.  Most recent DEXA bone scan was in 2019 lowest T-score was -3 left forearm.  Received Fosamax for 1 year about 20 years ago.  No history of fragility fracture.  We discussed today considering starting Prolia after repeating DEXA bone scan if there is worsening of the BMD we discussed today about using of Prolia and she was made aware that she cannot miss any doses after starting it we will attempt to treat for 2 and half year and get the Reclast following that if the kidney function at that time allow otherwise will be need to continue with Prolia.  She stated she will think about it and will discuss with her nephrologist and her hepatologist before making the final decision.    Plan:  -DEXA bone scan  -To get PTH/albumin/BMP/phosphorus/vitamin D with the next lab test.    Test and/or medications prescribed today:  Orders Placed This Encounter   Procedures     US Head Neck Soft Tissue     DX Bone Density     AFINION HEMOGLOBIN A1C POCT     TSH     T4 free     Thyroglobulin and Antibody (Sendout to [facility])     Parathyroid Hormone Intact     Albumin level     25 Hydroxyvitamin D2 and D3     Phosphorus     Basic metabolic panel     Ionized Calcium     Adult Nephrology  Referral         Follow up: 6 months      Rachel Masterson MD  Endocrinology, Diabetes and Metabolism  [facility]      60 minutes spent on the date of the encounter doing chart review, history and exam, documentation and further activities per the note                     Again, thank you for allowing me to participate in the care of your patient.      Sincerely,    Rachel  DENISSE Masterson

## 2024-09-11 ENCOUNTER — MYC MEDICAL ADVICE (OUTPATIENT)
Dept: ENDOCRINOLOGY | Facility: CLINIC | Age: 74
End: 2024-09-11
Payer: MEDICARE

## 2024-09-11 DIAGNOSIS — E11.22 TYPE 2 DIABETES MELLITUS WITH STAGE 3B CHRONIC KIDNEY DISEASE, WITHOUT LONG-TERM CURRENT USE OF INSULIN (H): Primary | ICD-10-CM

## 2024-09-11 DIAGNOSIS — N18.32 TYPE 2 DIABETES MELLITUS WITH STAGE 3B CHRONIC KIDNEY DISEASE, WITHOUT LONG-TERM CURRENT USE OF INSULIN (H): Primary | ICD-10-CM

## 2024-09-17 ENCOUNTER — TELEPHONE (OUTPATIENT)
Dept: ENDOCRINOLOGY | Facility: CLINIC | Age: 74
End: 2024-09-17
Payer: MEDICARE

## 2024-09-17 NOTE — TELEPHONE ENCOUNTER
Patient confirmed scheduled appointment:  Date: 10/22 and 11/08  Time: 1:30 and 1:30  Visit type: Lab  Provider: Aleja  Location: [facility]  Testing/imaging: Facilitated scheduling CT, US, and DEXA  Additional notes: Spoke with patient and scheduled her labs and facilitated imaging scheduling     Per checkout comments: Labs after 2 months US thyroid Dexa bone scan in [facility]     Eri Zepeda on 9/17/2024 at 4:04 PM

## 2024-09-18 DIAGNOSIS — Z79.899 HIGH RISK MEDICATION USE: Primary | ICD-10-CM

## 2024-09-20 DIAGNOSIS — N18.32 TYPE 2 DIABETES MELLITUS WITH STAGE 3B CHRONIC KIDNEY DISEASE, WITHOUT LONG-TERM CURRENT USE OF INSULIN (H): ICD-10-CM

## 2024-09-20 DIAGNOSIS — E11.22 TYPE 2 DIABETES MELLITUS WITH STAGE 3B CHRONIC KIDNEY DISEASE, WITHOUT LONG-TERM CURRENT USE OF INSULIN (H): ICD-10-CM

## 2024-09-22 ENCOUNTER — APPOINTMENT (OUTPATIENT)
Dept: GENERAL RADIOLOGY | Facility: CLINIC | Age: 74
DRG: 871 | End: 2024-09-22
Attending: EMERGENCY MEDICINE
Payer: MEDICARE

## 2024-09-22 ENCOUNTER — HOSPITAL ENCOUNTER (INPATIENT)
Facility: CLINIC | Age: 74
LOS: 3 days | Discharge: HOME HEALTH | DRG: 871 | End: 2024-09-25
Attending: EMERGENCY MEDICINE | Admitting: INTERNAL MEDICINE
Payer: MEDICARE

## 2024-09-22 DIAGNOSIS — R78.81 BACTEREMIA: ICD-10-CM

## 2024-09-22 DIAGNOSIS — Z94.4 STATUS POST LIVER TRANSPLANTATION (H): ICD-10-CM

## 2024-09-22 DIAGNOSIS — N39.0 URINARY TRACT INFECTION WITHOUT HEMATURIA, SITE UNSPECIFIED: ICD-10-CM

## 2024-09-22 DIAGNOSIS — R79.89 ELEVATED LACTIC ACID LEVEL: ICD-10-CM

## 2024-09-22 DIAGNOSIS — Z94.4 STATUS POST LIVER TRANSPLANTATION (H): Primary | ICD-10-CM

## 2024-09-22 DIAGNOSIS — R50.9 FEVER IN ADULT: ICD-10-CM

## 2024-09-22 DIAGNOSIS — R65.21 SEPTIC SHOCK (H): ICD-10-CM

## 2024-09-22 DIAGNOSIS — A41.9 SEPTIC SHOCK (H): ICD-10-CM

## 2024-09-22 DIAGNOSIS — N18.9 CHRONIC KIDNEY DISEASE, UNSPECIFIED CKD STAGE: ICD-10-CM

## 2024-09-22 LAB
ACINETOBACTER SPECIES: NOT DETECTED
ALBUMIN SERPL BCG-MCNC: 4.2 G/DL (ref 3.5–5.2)
ALBUMIN UR-MCNC: 70 MG/DL
ALP SERPL-CCNC: 146 U/L (ref 40–150)
ALT SERPL W P-5'-P-CCNC: 26 U/L (ref 0–50)
ANION GAP SERPL CALCULATED.3IONS-SCNC: 15 MMOL/L (ref 7–15)
ANION GAP SERPL CALCULATED.3IONS-SCNC: 20 MMOL/L (ref 7–15)
APPEARANCE UR: CLEAR
AST SERPL W P-5'-P-CCNC: 31 U/L (ref 0–45)
BACTERIA #/AREA URNS HPF: ABNORMAL /HPF
BASOPHILS # BLD AUTO: 0 10E3/UL (ref 0–0.2)
BASOPHILS NFR BLD AUTO: 0 %
BILIRUB SERPL-MCNC: 0.8 MG/DL
BILIRUB UR QL STRIP: NEGATIVE
BUN SERPL-MCNC: 28.7 MG/DL (ref 8–23)
BUN SERPL-MCNC: 30.8 MG/DL (ref 8–23)
CALCIUM SERPL-MCNC: 10.1 MG/DL (ref 8.8–10.4)
CALCIUM SERPL-MCNC: 8.2 MG/DL (ref 8.8–10.4)
CHLORIDE SERPL-SCNC: 104 MMOL/L (ref 98–107)
CHLORIDE SERPL-SCNC: 96 MMOL/L (ref 98–107)
CITROBACTER SPECIES: NOT DETECTED
COLOR UR AUTO: ABNORMAL
CREAT SERPL-MCNC: 1.69 MG/DL (ref 0.51–0.95)
CREAT SERPL-MCNC: 1.73 MG/DL (ref 0.51–0.95)
CTX-M: DETECTED
EGFRCR SERPLBLD CKD-EPI 2021: 30 ML/MIN/1.73M2
EGFRCR SERPLBLD CKD-EPI 2021: 31 ML/MIN/1.73M2
ENTEROBACTER SPECIES: NOT DETECTED
EOSINOPHIL # BLD AUTO: 0 10E3/UL (ref 0–0.7)
EOSINOPHIL NFR BLD AUTO: 0 %
ERYTHROCYTE [DISTWIDTH] IN BLOOD BY AUTOMATED COUNT: 15.2 % (ref 10–15)
ESCHERICHIA COLI: NOT DETECTED
FLUAV RNA SPEC QL NAA+PROBE: NEGATIVE
FLUBV RNA RESP QL NAA+PROBE: NEGATIVE
GLUCOSE BLDC GLUCOMTR-MCNC: 206 MG/DL (ref 70–99)
GLUCOSE BLDC GLUCOMTR-MCNC: 210 MG/DL (ref 70–99)
GLUCOSE BLDC GLUCOMTR-MCNC: 231 MG/DL (ref 70–99)
GLUCOSE BLDC GLUCOMTR-MCNC: 236 MG/DL (ref 70–99)
GLUCOSE BLDC GLUCOMTR-MCNC: 241 MG/DL (ref 70–99)
GLUCOSE BLDC GLUCOMTR-MCNC: 267 MG/DL (ref 70–99)
GLUCOSE BLDC GLUCOMTR-MCNC: 291 MG/DL (ref 70–99)
GLUCOSE SERPL-MCNC: 192 MG/DL (ref 70–99)
GLUCOSE SERPL-MCNC: 247 MG/DL (ref 70–99)
GLUCOSE UR STRIP-MCNC: 50 MG/DL
HCO3 SERPL-SCNC: 21 MMOL/L (ref 22–29)
HCO3 SERPL-SCNC: 23 MMOL/L (ref 22–29)
HCT VFR BLD AUTO: 41.5 % (ref 35–47)
HGB BLD-MCNC: 12.9 G/DL (ref 11.7–15.7)
HGB UR QL STRIP: NEGATIVE
HOLD SPECIMEN: NORMAL
HOLD SPECIMEN: NORMAL
IMM GRANULOCYTES # BLD: 0.1 10E3/UL
IMM GRANULOCYTES NFR BLD: 1 %
IMP: NOT DETECTED
KETONES UR STRIP-MCNC: NEGATIVE MG/DL
KLEBSIELLA OXYTOCA: DETECTED
KLEBSIELLA PNEUMONIAE: NOT DETECTED
KPC: NOT DETECTED
LACTATE SERPL-SCNC: 2.1 MMOL/L (ref 0.7–2)
LACTATE SERPL-SCNC: 2.9 MMOL/L (ref 0.7–2)
LACTATE SERPL-SCNC: 2.9 MMOL/L (ref 0.7–2)
LACTATE SERPL-SCNC: 4 MMOL/L (ref 0.7–2)
LEUKOCYTE ESTERASE UR QL STRIP: ABNORMAL
LYMPHOCYTES # BLD AUTO: 0.3 10E3/UL (ref 0.8–5.3)
LYMPHOCYTES NFR BLD AUTO: 6 %
MCH RBC QN AUTO: 29.8 PG (ref 26.5–33)
MCHC RBC AUTO-ENTMCNC: 31.1 G/DL (ref 31.5–36.5)
MCV RBC AUTO: 96 FL (ref 78–100)
MONOCYTES # BLD AUTO: 0 10E3/UL (ref 0–1.3)
MONOCYTES NFR BLD AUTO: 1 %
NDM: NOT DETECTED
NEUTROPHILS # BLD AUTO: 4.7 10E3/UL (ref 1.6–8.3)
NEUTROPHILS NFR BLD AUTO: 92 %
NITRATE UR QL: NEGATIVE
NRBC # BLD AUTO: 0 10E3/UL
NRBC BLD AUTO-RTO: 0 /100
OXA (DETECTED/NOT DETECTED): NOT DETECTED
PH UR STRIP: 7.5 [PH] (ref 5–7)
PLATELET # BLD AUTO: 383 10E3/UL (ref 150–450)
POTASSIUM SERPL-SCNC: 3.2 MMOL/L (ref 3.4–5.3)
POTASSIUM SERPL-SCNC: 3.5 MMOL/L (ref 3.4–5.3)
POTASSIUM SERPL-SCNC: 4.3 MMOL/L (ref 3.4–5.3)
PROCALCITONIN SERPL IA-MCNC: 0.13 NG/ML
PROT SERPL-MCNC: 7.8 G/DL (ref 6.4–8.3)
PROTEUS SPECIES: NOT DETECTED
PSEUDOMONAS AERUGINOSA: NOT DETECTED
RBC # BLD AUTO: 4.33 10E6/UL (ref 3.8–5.2)
RBC URINE: 2 /HPF
RSV RNA SPEC NAA+PROBE: NEGATIVE
SARS-COV-2 RNA RESP QL NAA+PROBE: NEGATIVE
SODIUM SERPL-SCNC: 139 MMOL/L (ref 135–145)
SODIUM SERPL-SCNC: 140 MMOL/L (ref 135–145)
SP GR UR STRIP: 1.01 (ref 1–1.03)
SQUAMOUS EPITHELIAL: 1 /HPF
UROBILINOGEN UR STRIP-MCNC: NORMAL MG/DL
VIM: NOT DETECTED
WBC # BLD AUTO: 5.1 10E3/UL (ref 4–11)
WBC URINE: 116 /HPF

## 2024-09-22 PROCEDURE — 71045 X-RAY EXAM CHEST 1 VIEW: CPT

## 2024-09-22 PROCEDURE — 82962 GLUCOSE BLOOD TEST: CPT

## 2024-09-22 PROCEDURE — 36415 COLL VENOUS BLD VENIPUNCTURE: CPT | Performed by: INTERNAL MEDICINE

## 2024-09-22 PROCEDURE — 85025 COMPLETE CBC W/AUTO DIFF WBC: CPT | Performed by: EMERGENCY MEDICINE

## 2024-09-22 PROCEDURE — 250N000013 HC RX MED GY IP 250 OP 250 PS 637: Performed by: INTERNAL MEDICINE

## 2024-09-22 PROCEDURE — 84145 PROCALCITONIN (PCT): CPT | Performed by: EMERGENCY MEDICINE

## 2024-09-22 PROCEDURE — 96361 HYDRATE IV INFUSION ADD-ON: CPT

## 2024-09-22 PROCEDURE — 99207 PR APP CREDIT; MD BILLING SHARED VISIT: CPT | Performed by: INTERNAL MEDICINE

## 2024-09-22 PROCEDURE — 36415 COLL VENOUS BLD VENIPUNCTURE: CPT | Performed by: EMERGENCY MEDICINE

## 2024-09-22 PROCEDURE — 250N000011 HC RX IP 250 OP 636: Performed by: EMERGENCY MEDICINE

## 2024-09-22 PROCEDURE — 99285 EMERGENCY DEPT VISIT HI MDM: CPT | Mod: 25

## 2024-09-22 PROCEDURE — 87040 BLOOD CULTURE FOR BACTERIA: CPT | Performed by: EMERGENCY MEDICINE

## 2024-09-22 PROCEDURE — 250N000011 HC RX IP 250 OP 636: Performed by: INTERNAL MEDICINE

## 2024-09-22 PROCEDURE — 99222 1ST HOSP IP/OBS MODERATE 55: CPT | Performed by: SPECIALIST

## 2024-09-22 PROCEDURE — 200N000001 HC R&B ICU

## 2024-09-22 PROCEDURE — 250N000012 HC RX MED GY IP 250 OP 636 PS 637: Performed by: INTERNAL MEDICINE

## 2024-09-22 PROCEDURE — 3E043XZ INTRODUCTION OF VASOPRESSOR INTO CENTRAL VEIN, PERCUTANEOUS APPROACH: ICD-10-PCS | Performed by: INTERNAL MEDICINE

## 2024-09-22 PROCEDURE — 81001 URINALYSIS AUTO W/SCOPE: CPT | Performed by: EMERGENCY MEDICINE

## 2024-09-22 PROCEDURE — 83605 ASSAY OF LACTIC ACID: CPT | Performed by: EMERGENCY MEDICINE

## 2024-09-22 PROCEDURE — 96365 THER/PROPH/DIAG IV INF INIT: CPT | Mod: 59

## 2024-09-22 PROCEDURE — 99223 1ST HOSP IP/OBS HIGH 75: CPT | Mod: AI | Performed by: INTERNAL MEDICINE

## 2024-09-22 PROCEDURE — 250N000013 HC RX MED GY IP 250 OP 250 PS 637: Performed by: EMERGENCY MEDICINE

## 2024-09-22 PROCEDURE — 250N000009 HC RX 250: Performed by: EMERGENCY MEDICINE

## 2024-09-22 PROCEDURE — 84132 ASSAY OF SERUM POTASSIUM: CPT | Performed by: STUDENT IN AN ORGANIZED HEALTH CARE EDUCATION/TRAINING PROGRAM

## 2024-09-22 PROCEDURE — 87186 SC STD MICRODIL/AGAR DIL: CPT | Performed by: EMERGENCY MEDICINE

## 2024-09-22 PROCEDURE — 87077 CULTURE AEROBIC IDENTIFY: CPT | Performed by: EMERGENCY MEDICINE

## 2024-09-22 PROCEDURE — 999N000065 XR CHEST PORT 1 VIEW

## 2024-09-22 PROCEDURE — 82310 ASSAY OF CALCIUM: CPT | Performed by: INTERNAL MEDICINE

## 2024-09-22 PROCEDURE — 87149 DNA/RNA DIRECT PROBE: CPT | Performed by: EMERGENCY MEDICINE

## 2024-09-22 PROCEDURE — 258N000003 HC RX IP 258 OP 636: Performed by: EMERGENCY MEDICINE

## 2024-09-22 PROCEDURE — 87637 SARSCOV2&INF A&B&RSV AMP PRB: CPT | Performed by: EMERGENCY MEDICINE

## 2024-09-22 PROCEDURE — 80053 COMPREHEN METABOLIC PANEL: CPT | Performed by: EMERGENCY MEDICINE

## 2024-09-22 PROCEDURE — 83605 ASSAY OF LACTIC ACID: CPT | Performed by: INTERNAL MEDICINE

## 2024-09-22 RX ORDER — SODIUM CHLORIDE AND POTASSIUM CHLORIDE 150; 450 MG/100ML; MG/100ML
INJECTION, SOLUTION INTRAVENOUS CONTINUOUS
Status: DISCONTINUED | OUTPATIENT
Start: 2024-09-22 | End: 2024-09-23

## 2024-09-22 RX ORDER — POLYETHYLENE GLYCOL 3350 17 G/17G
17 POWDER, FOR SOLUTION ORAL 2 TIMES DAILY PRN
Status: DISCONTINUED | OUTPATIENT
Start: 2024-09-22 | End: 2024-09-25 | Stop reason: HOSPADM

## 2024-09-22 RX ORDER — MEROPENEM 1 G/1
1 INJECTION, POWDER, FOR SOLUTION INTRAVENOUS EVERY 12 HOURS
Status: DISCONTINUED | OUTPATIENT
Start: 2024-09-22 | End: 2024-09-25

## 2024-09-22 RX ORDER — FUROSEMIDE 20 MG
20 TABLET ORAL DAILY
Status: DISCONTINUED | OUTPATIENT
Start: 2024-09-22 | End: 2024-09-22

## 2024-09-22 RX ORDER — SIROLIMUS 1 MG/1
1 TABLET, FILM COATED ORAL DAILY
Status: DISCONTINUED | OUTPATIENT
Start: 2024-09-22 | End: 2024-09-22

## 2024-09-22 RX ORDER — GABAPENTIN 250 MG/5ML
300 SOLUTION ORAL AT BEDTIME
Status: DISCONTINUED | OUTPATIENT
Start: 2024-09-22 | End: 2024-09-25 | Stop reason: HOSPADM

## 2024-09-22 RX ORDER — AMOXICILLIN 250 MG
2 CAPSULE ORAL 2 TIMES DAILY PRN
Status: DISCONTINUED | OUTPATIENT
Start: 2024-09-22 | End: 2024-09-25 | Stop reason: HOSPADM

## 2024-09-22 RX ORDER — HYDROMORPHONE HYDROCHLORIDE 2 MG/1
2 TABLET ORAL EVERY 4 HOURS PRN
Status: DISCONTINUED | OUTPATIENT
Start: 2024-09-22 | End: 2024-09-25 | Stop reason: HOSPADM

## 2024-09-22 RX ORDER — DEXTROSE MONOHYDRATE 25 G/50ML
25-50 INJECTION, SOLUTION INTRAVENOUS
Status: DISCONTINUED | OUTPATIENT
Start: 2024-09-22 | End: 2024-09-22

## 2024-09-22 RX ORDER — SIROLIMUS 0.5 MG/1
1 TABLET, FILM COATED ORAL DAILY
Status: DISCONTINUED | OUTPATIENT
Start: 2024-09-22 | End: 2024-09-25 | Stop reason: HOSPADM

## 2024-09-22 RX ORDER — DEXTROSE MONOHYDRATE 25 G/50ML
25-50 INJECTION, SOLUTION INTRAVENOUS
Status: DISCONTINUED | OUTPATIENT
Start: 2024-09-22 | End: 2024-09-25 | Stop reason: HOSPADM

## 2024-09-22 RX ORDER — GUAR GUM
1 PACKET (EA) ORAL DAILY
COMMUNITY

## 2024-09-22 RX ORDER — MEROPENEM 1 G/1
1 INJECTION, POWDER, FOR SOLUTION INTRAVENOUS ONCE
Status: COMPLETED | OUTPATIENT
Start: 2024-09-22 | End: 2024-09-22

## 2024-09-22 RX ORDER — METOPROLOL SUCCINATE 25 MG/1
25 TABLET, EXTENDED RELEASE ORAL DAILY
Status: DISCONTINUED | OUTPATIENT
Start: 2024-09-22 | End: 2024-09-22

## 2024-09-22 RX ORDER — ONDANSETRON 2 MG/ML
4 INJECTION INTRAMUSCULAR; INTRAVENOUS EVERY 6 HOURS PRN
Status: DISCONTINUED | OUTPATIENT
Start: 2024-09-22 | End: 2024-09-25 | Stop reason: HOSPADM

## 2024-09-22 RX ORDER — HYDRALAZINE HYDROCHLORIDE 25 MG/1
25 TABLET, FILM COATED ORAL DAILY PRN
Status: DISCONTINUED | OUTPATIENT
Start: 2024-09-22 | End: 2024-09-23

## 2024-09-22 RX ORDER — CALCIUM CARBONATE 500 MG/1
1000 TABLET, CHEWABLE ORAL 4 TIMES DAILY PRN
Status: DISCONTINUED | OUTPATIENT
Start: 2024-09-22 | End: 2024-09-25 | Stop reason: HOSPADM

## 2024-09-22 RX ORDER — ACETAMINOPHEN 325 MG/10.15ML
1000 LIQUID ORAL ONCE
Status: DISCONTINUED | OUTPATIENT
Start: 2024-09-22 | End: 2024-09-22

## 2024-09-22 RX ORDER — ACETAMINOPHEN 325 MG/1
975 TABLET ORAL EVERY 6 HOURS PRN
Status: DISCONTINUED | OUTPATIENT
Start: 2024-09-22 | End: 2024-09-25 | Stop reason: HOSPADM

## 2024-09-22 RX ORDER — ESTRADIOL 0.1 MG/G
CREAM VAGINAL EVERY OTHER DAY
COMMUNITY

## 2024-09-22 RX ORDER — NOREPINEPHRINE BITARTRATE 0.02 MG/ML
.01-.6 INJECTION, SOLUTION INTRAVENOUS CONTINUOUS
Status: DISCONTINUED | OUTPATIENT
Start: 2024-09-22 | End: 2024-09-23

## 2024-09-22 RX ORDER — CALCITRIOL 0.25 UG/1
0.25 CAPSULE, LIQUID FILLED ORAL DAILY
Status: DISCONTINUED | OUTPATIENT
Start: 2024-09-22 | End: 2024-09-25 | Stop reason: HOSPADM

## 2024-09-22 RX ORDER — PROCHLORPERAZINE 25 MG
12.5 SUPPOSITORY, RECTAL RECTAL EVERY 12 HOURS PRN
Status: DISCONTINUED | OUTPATIENT
Start: 2024-09-22 | End: 2024-09-25 | Stop reason: HOSPADM

## 2024-09-22 RX ORDER — GUAR GUM
1 PACKET (EA) ORAL DAILY PRN
Status: DISCONTINUED | OUTPATIENT
Start: 2024-09-22 | End: 2024-09-25 | Stop reason: HOSPADM

## 2024-09-22 RX ORDER — NICOTINE POLACRILEX 4 MG
15-30 LOZENGE BUCCAL
Status: DISCONTINUED | OUTPATIENT
Start: 2024-09-22 | End: 2024-09-25 | Stop reason: HOSPADM

## 2024-09-22 RX ORDER — FOLIC ACID 1 MG/1
1 TABLET ORAL DAILY
Status: DISCONTINUED | OUTPATIENT
Start: 2024-09-22 | End: 2024-09-25 | Stop reason: HOSPADM

## 2024-09-22 RX ORDER — AMOXICILLIN 250 MG
1 CAPSULE ORAL 2 TIMES DAILY PRN
Status: DISCONTINUED | OUTPATIENT
Start: 2024-09-22 | End: 2024-09-25 | Stop reason: HOSPADM

## 2024-09-22 RX ORDER — PROCHLORPERAZINE MALEATE 5 MG
5 TABLET ORAL EVERY 6 HOURS PRN
Status: DISCONTINUED | OUTPATIENT
Start: 2024-09-22 | End: 2024-09-25 | Stop reason: HOSPADM

## 2024-09-22 RX ORDER — LIDOCAINE 40 MG/G
CREAM TOPICAL
Status: DISCONTINUED | OUTPATIENT
Start: 2024-09-22 | End: 2024-09-25 | Stop reason: HOSPADM

## 2024-09-22 RX ORDER — PREDNISONE 10 MG/1
10 TABLET ORAL DAILY
Status: DISCONTINUED | OUTPATIENT
Start: 2024-09-22 | End: 2024-09-25 | Stop reason: HOSPADM

## 2024-09-22 RX ORDER — SUMATRIPTAN 50 MG/1
50 TABLET, FILM COATED ORAL
Status: DISCONTINUED | OUTPATIENT
Start: 2024-09-22 | End: 2024-09-22

## 2024-09-22 RX ORDER — NICOTINE POLACRILEX 4 MG
15-30 LOZENGE BUCCAL
Status: DISCONTINUED | OUTPATIENT
Start: 2024-09-22 | End: 2024-09-22

## 2024-09-22 RX ORDER — METOPROLOL SUCCINATE 25 MG/1
25 TABLET, EXTENDED RELEASE ORAL DAILY PRN
Status: DISCONTINUED | OUTPATIENT
Start: 2024-09-22 | End: 2024-09-23

## 2024-09-22 RX ORDER — LEVOTHYROXINE SODIUM 175 UG/1
175 TABLET ORAL DAILY
Status: DISCONTINUED | OUTPATIENT
Start: 2024-09-22 | End: 2024-09-25 | Stop reason: HOSPADM

## 2024-09-22 RX ORDER — ONDANSETRON 4 MG/1
4 TABLET, ORALLY DISINTEGRATING ORAL EVERY 6 HOURS PRN
Status: DISCONTINUED | OUTPATIENT
Start: 2024-09-22 | End: 2024-09-25 | Stop reason: HOSPADM

## 2024-09-22 RX ORDER — SIROLIMUS 0.5 MG/1
1 TABLET, SUGAR COATED ORAL DAILY
Status: DISCONTINUED | OUTPATIENT
Start: 2024-09-22 | End: 2024-09-22

## 2024-09-22 RX ORDER — MECLIZINE HYDROCHLORIDE 25 MG/1
25 TABLET ORAL 2 TIMES DAILY PRN
Status: DISCONTINUED | OUTPATIENT
Start: 2024-09-22 | End: 2024-09-25 | Stop reason: HOSPADM

## 2024-09-22 RX ORDER — EZETIMIBE 10 MG/1
10 TABLET ORAL DAILY
Status: DISCONTINUED | OUTPATIENT
Start: 2024-09-22 | End: 2024-09-25 | Stop reason: HOSPADM

## 2024-09-22 RX ORDER — URSODIOL 250 MG/1
250 TABLET, FILM COATED ORAL 2 TIMES DAILY
Status: DISCONTINUED | OUTPATIENT
Start: 2024-09-22 | End: 2024-09-25 | Stop reason: HOSPADM

## 2024-09-22 RX ORDER — GABAPENTIN 250 MG/5ML
300 SOLUTION ORAL ONCE
Status: DISCONTINUED | OUTPATIENT
Start: 2024-09-22 | End: 2024-09-22

## 2024-09-22 RX ADMIN — GABAPENTIN 300 MG: 250 SUSPENSION ORAL at 22:44

## 2024-09-22 RX ADMIN — LEVOTHYROXINE SODIUM 175 MCG: 0.17 TABLET ORAL at 11:30

## 2024-09-22 RX ADMIN — APIXABAN 2.5 MG: 2.5 TABLET, FILM COATED ORAL at 11:37

## 2024-09-22 RX ADMIN — ACETAMINOPHEN 1000 MG: 325 SUSPENSION ORAL at 02:01

## 2024-09-22 RX ADMIN — HYDROCORTISONE SODIUM SUCCINATE 50 MG: 100 INJECTION, POWDER, FOR SOLUTION INTRAMUSCULAR; INTRAVENOUS at 18:43

## 2024-09-22 RX ADMIN — INSULIN ASPART 1 UNITS: 100 INJECTION, SOLUTION INTRAVENOUS; SUBCUTANEOUS at 22:40

## 2024-09-22 RX ADMIN — URSODIOL 250 MG: 250 TABLET ORAL at 11:30

## 2024-09-22 RX ADMIN — POTASSIUM CHLORIDE AND SODIUM CHLORIDE: 450; 150 INJECTION, SOLUTION INTRAVENOUS at 11:29

## 2024-09-22 RX ADMIN — MEROPENEM 1 G: 1 INJECTION, POWDER, FOR SOLUTION INTRAVENOUS at 18:41

## 2024-09-22 RX ADMIN — SODIUM CHLORIDE 1000 ML: 9 INJECTION, SOLUTION INTRAVENOUS at 02:43

## 2024-09-22 RX ADMIN — SIROLIMUS 1 MG: 0.5 TABLET, FILM COATED ORAL at 18:40

## 2024-09-22 RX ADMIN — SODIUM CHLORIDE 500 ML: 9 INJECTION, SOLUTION INTRAVENOUS at 03:35

## 2024-09-22 RX ADMIN — URSODIOL 250 MG: 250 TABLET ORAL at 20:46

## 2024-09-22 RX ADMIN — CALCITRIOL CAPSULES 0.25 MCG 0.25 MCG: 0.25 CAPSULE ORAL at 11:31

## 2024-09-22 RX ADMIN — SODIUM CHLORIDE, POTASSIUM CHLORIDE, SODIUM LACTATE AND CALCIUM CHLORIDE 1000 ML: 600; 310; 30; 20 INJECTION, SOLUTION INTRAVENOUS at 04:50

## 2024-09-22 RX ADMIN — NOREPINEPHRINE BITARTRATE 0.03 MCG/KG/MIN: 0.02 INJECTION, SOLUTION INTRAVENOUS at 06:46

## 2024-09-22 RX ADMIN — MEROPENEM 1 G: 1 INJECTION, POWDER, FOR SOLUTION INTRAVENOUS at 03:37

## 2024-09-22 RX ADMIN — POTASSIUM CHLORIDE AND SODIUM CHLORIDE: 450; 150 INJECTION, SOLUTION INTRAVENOUS at 11:44

## 2024-09-22 RX ADMIN — SODIUM CHLORIDE 1000 ML: 9 INJECTION, SOLUTION INTRAVENOUS at 01:57

## 2024-09-22 RX ADMIN — APIXABAN 2.5 MG: 2.5 TABLET, FILM COATED ORAL at 20:45

## 2024-09-22 RX ADMIN — FOLIC ACID 1 MG: 1 TABLET ORAL at 11:37

## 2024-09-22 RX ADMIN — SITAGLIPTIN 50 MG: 50 TABLET, FILM COATED ORAL at 12:59

## 2024-09-22 RX ADMIN — PREDNISONE 10 MG: 10 TABLET ORAL at 11:37

## 2024-09-22 RX ADMIN — HYDROCORTISONE SODIUM SUCCINATE 100 MG: 100 INJECTION, POWDER, FOR SOLUTION INTRAMUSCULAR; INTRAVENOUS at 10:10

## 2024-09-22 ASSESSMENT — ACTIVITIES OF DAILY LIVING (ADL)
ADLS_ACUITY_SCORE: 31
ADLS_ACUITY_SCORE: 31
ADLS_ACUITY_SCORE: 33
ADLS_ACUITY_SCORE: 36
ADLS_ACUITY_SCORE: 31
ADLS_ACUITY_SCORE: 33
ADLS_ACUITY_SCORE: 31
ADLS_ACUITY_SCORE: 31
ADLS_ACUITY_SCORE: 33
ADLS_ACUITY_SCORE: 36

## 2024-09-22 ASSESSMENT — COLUMBIA-SUICIDE SEVERITY RATING SCALE - C-SSRS
6. HAVE YOU EVER DONE ANYTHING, STARTED TO DO ANYTHING, OR PREPARED TO DO ANYTHING TO END YOUR LIFE?: NO
1. IN THE PAST MONTH, HAVE YOU WISHED YOU WERE DEAD OR WISHED YOU COULD GO TO SLEEP AND NOT WAKE UP?: NO
2. HAVE YOU ACTUALLY HAD ANY THOUGHTS OF KILLING YOURSELF IN THE PAST MONTH?: NO
6. HAVE YOU EVER DONE ANYTHING, STARTED TO DO ANYTHING, OR PREPARED TO DO ANYTHING TO END YOUR LIFE?: NO
2. HAVE YOU ACTUALLY HAD ANY THOUGHTS OF KILLING YOURSELF IN THE PAST MONTH?: NO
1. IN THE PAST MONTH, HAVE YOU WISHED YOU WERE DEAD OR WISHED YOU COULD GO TO SLEEP AND NOT WAKE UP?: NO

## 2024-09-22 NOTE — PHARMACY-ADMISSION MEDICATION HISTORY
Pharmacy Intern Admission Medication History    Admission medication history is complete. The information provided in this note is only as accurate as the sources available at the time of the update.    Information Source(s): Patient and CareEverywhere/SureScripts via in-person    Pertinent Information: The patient stated she is due for her Repatha injection today.     Changes made to PTA medication list:  Added: D-mannose, Nutrisource fiber   Deleted: Sodium chloride nasal spray, Sumatriptan   Changed:   Astelin nasal spray daily-> PRN   Estradoil cream twice a week-> every other day     Allergies reviewed with patient and updates made in EHR: no    Medication History Completed By: Mariah Tobin RPH 9/22/2024 10:55 AM    PTA Med List   Medication Sig Note Last Dose    apixaban ANTICOAGULANT (ELIQUIS ANTICOAGULANT) 2.5 MG tablet Take 1 tablet (2.5 mg) by mouth 2 times daily  9/21/2024    azelastine (ASTELIN) 0.1 % nasal spray Spray 1 spray into both nostrils as needed for allergies or rhinitis.  Unknown    calcitRIOL (ROCALTROL) 0.25 MCG capsule Take 1 capsule (0.25 mcg) by mouth daily.  9/21/2024    D-MANNOSE PO Take 1 Scoop by mouth 2 times daily.  9/21/2024    estradiol (ESTRACE) 0.1 MG/GM vaginal cream Place vaginally every other day.  9/21/2024    evolocumab (REPATHA SURECLICK) 140 MG/ML prefilled autoinjector Inject 1 mL (140 mg) subcutaneously every 14 days. . Please have fasting labs drawn for further refills.  Past Week    ezetimibe (ZETIA) 10 MG tablet TAKE 1 TABLET EVERY DAY  9/21/2024    Ferrous Sulfate 324 MG TBEC Take 1 tablet twice a day by oral route.  9/21/2024    folic acid (FOLVITE) 1 MG tablet Take 1 tablet (1 mg) by mouth daily  9/21/2024    furosemide (LASIX) 20 MG tablet Take 1 tablet (20 mg) by mouth daily  9/21/2024    gabapentin (NEURONTIN) 250 MG/5ML solution Take 6 mLs (300 mg) by mouth at bedtime  9/21/2024    glucose (BD GLUCOSE) 5 g chewable tablet Take 2 tablets (10 g) by mouth as  needed (low blood sugar)  Past Month    hydrALAZINE (APRESOLINE) 25 MG tablet Take 1 tablet (25 mg) by mouth as needed (systolic bp > 160).  Past Week    hypromellose (ARTIFICIAL TEARS) 0.4 % SOLN ophthalmic solution Apply 1 drop to eye every hour as needed for dry eyes  Unknown    levothyroxine (SYNTHROID/LEVOTHROID) 175 MCG tablet Take 1 tablet (175 mcg) by mouth daily.  9/21/2024    linagliptin (TRADJENTA) 5 MG TABS tablet Take 1 tablet (5 mg) by mouth daily  9/21/2024    meclizine (ANTIVERT) 25 MG tablet Take 1 tablet (25 mg) by mouth as needed for dizziness or other (migraines) 9/22/2024: Or she will use Dramamine  More than a month    metoprolol succinate ER (TOPROL XL) 25 MG 24 hr tablet Take 25 mg by mouth daily.  9/21/2024    Multiple Vitamins-Minerals (PRESERVISION AREDS 2) CAPS Take 1 capsule by mouth 2 times daily  9/21/2024    Nutrisource Fiber PO packet Take 1 packet by mouth daily.  9/21/2024    omega-3 acid ethyl esters (LOVAZA) 1 g capsule Take 1 capsule by mouth 2 times daily  9/21/2024    predniSONE (DELTASONE) 10 MG tablet Take 1 tablet (10 mg) by mouth daily.  9/21/2024    sirolimus (GENERIC EQUIVALENT) 1 MG tablet Take 1 tablet (1 mg) by mouth daily.  9/21/2024    ursodiol (ACTIGALL) 250 MG tablet Take 1 tablet (250 mg) by mouth 2 times daily  9/21/2024

## 2024-09-22 NOTE — ED NOTES
[facility]  ED Nurse Handoff Report    ED Chief complaint: Nausea and Generalized Weakness  . ED Diagnosis:   Final diagnoses:   Fever in adult   Status post liver transplantation (H)   Urinary tract infection without hematuria, site unspecified   Elevated lactic acid level   Chronic kidney disease, unspecified CKD stage   Sepsis, due to unspecified organism, unspecified whether acute organ dysfunction present (H)       Allergies:   Allergies   Allergen Reactions    Fluconazole Hives and Itching     Full body hives      Mycophenolate Diarrhea and Nausea and Vomiting     Patient stated it was chronic and lasted months      Penicillins Rash, Anaphylaxis, Hives and Itching    Simvastatin Muscle Pain (Myalgia)     severe  Other reaction(s): Myalgia caused by statin    Methotrexate Other (See Comments)     Other reaction(s): Sore  Sores in mouth, esophagus, and stomach.       Morphine And Codeine Itching and Other (See Comments)     Psych disturbance  Other reaction(s): Confusion, Mood alteration    Quinolones Anxiety, Dizziness, Headache, Other (See Comments), Palpitations and Unknown     Other reaction(s): Hyperactive behavior, Lightheadedness, Mood alteration    Dizzy, light headed    Dizziness, shaky, and jumpy    Benadryl [Diphenhydramine Hcl]      Insomnia     Capsules, Empty Gelatin [Gelatin]     Cephalosporins Itching    Ciprofloxacin Hcl Dizziness and Other (See Comments)    Lansoprazole Diarrhea    Azithromycin Itching    Doxycycline Itching and Unknown    Lisinopril Cough    Omeprazole Itching    Tolectin [Nsaids] Rash    Tolmetin Rash and Itching    Tramadol Rash, Hives and Itching       Code Status: Full Code    Activity level - Baseline/Home:  walker.  Activity Level - Current:   in bed.   Lift room needed: No.   Bariatric: No   Needed: No   Isolation: Yes.   Infection: ESBL.     Respiratory status: Nasal cannula    Vital Signs (within 30 minutes):   Vitals:    09/22/24  0248 09/22/24 0250 09/22/24 0253 09/22/24 0337   BP:       Pulse: 101 100 99 89   Resp: 12  11 15   Temp:    98.2  F (36.8  C)   TempSrc:       SpO2: 99%  98% 100%       Cardiac Rhythm:  ,      Pain level:    Patient confused: No.   Patient Falls Risk: arm band in place, patient and family education, assistive device/personal items within reach, and room door open.   Elimination Status: Has voided     Patient Report - Initial Complaint: weakness, nausea.   Focused Assessment: fever. UTI, history of liver transplant, sepsis, ckd, lactic acid elevation     Abnormal Results:   Labs Ordered and Resulted from Time of ED Arrival to Time of ED Departure   COMPREHENSIVE METABOLIC PANEL - Abnormal       Result Value    Sodium 139      Potassium 3.5      Carbon Dioxide (CO2) 23      Anion Gap 20 (*)     Urea Nitrogen 30.8 (*)     Creatinine 1.73 (*)     GFR Estimate 30 (*)     Calcium 10.1      Chloride 96 (*)     Glucose 192 (*)     Alkaline Phosphatase 146      AST 31      ALT 26      Protein Total 7.8      Albumin 4.2      Bilirubin Total 0.8     LACTIC ACID WHOLE BLOOD - Abnormal    Lactic Acid 4.0 (*)    CBC WITH PLATELETS AND DIFFERENTIAL - Abnormal    WBC Count 5.1      RBC Count 4.33      Hemoglobin 12.9      Hematocrit 41.5      MCV 96      MCH 29.8      MCHC 31.1 (*)     RDW 15.2 (*)     Platelet Count 383      % Neutrophils 92      % Lymphocytes 6      % Monocytes 1      % Eosinophils 0      % Basophils 0      % Immature Granulocytes 1      NRBCs per 100 WBC 0      Absolute Neutrophils 4.7      Absolute Lymphocytes 0.3 (*)     Absolute Monocytes 0.0      Absolute Eosinophils 0.0      Absolute Basophils 0.0      Absolute Immature Granulocytes 0.1      Absolute NRBCs 0.0     ROUTINE UA WITH MICROSCOPIC - Abnormal    Color Urine Light Yellow      Appearance Urine Clear      Glucose Urine 50 (*)     Bilirubin Urine Negative      Ketones Urine Negative      Specific Gravity Urine 1.009      Blood Urine Negative      pH  Urine 7.5 (*)     Protein Albumin Urine 70 (*)     Urobilinogen Urine Normal      Nitrite Urine Negative      Leukocyte Esterase Urine Moderate (*)     Bacteria Urine Moderate (*)     RBC Urine 2      WBC Urine 116 (*)     Squamous Epithelials Urine 1     LACTIC ACID WHOLE BLOOD - Abnormal    Lactic Acid 2.9 (*)    PROCALCITONIN - Normal    Procalcitonin 0.13     INFLUENZA A/B, RSV, & SARS-COV2 PCR - Normal    Influenza A PCR Negative      Influenza B PCR Negative      RSV PCR Negative      SARS CoV2 PCR Negative     BLOOD CULTURE   URINE CULTURE        XR Chest Port 1 View   Final Result   IMPRESSION: No consolidation or edema. No pleural effusion or pneumothorax. Normal heart size and pulmonary vascularity. Implanted electronic device projecting over the left chest.           Treatments provided: iv fluid resuscitation, antibiotics  Family Comments:   OBS brochure/video discussed/provided to patient:  N/A  ED Medications:   Medications   sodium chloride 0.9% BOLUS 500 mL (500 mLs Intravenous $New Bag 9/22/24 0335)   gabapentin (NEURONTIN) solution 300 mg (has no administration in time range)   meropenem (MERREM) 1 g vial to attach to  mL bag (1 g Intravenous $New Bag 9/22/24 0337)   sodium chloride 0.9% BOLUS 1,000 mL (0 mLs Intravenous Stopped 9/22/24 0335)   acetaminophen (TYLENOL) oral liquid 1,000 mg (1,000 mg Oral $Given 9/22/24 0201)   sodium chloride 0.9% BOLUS 1,000 mL (0 mLs Intravenous Stopped 9/22/24 0334)       Drips infusing:  Yes  For the majority of the shift this patient was Green.   Interventions performed were .    Sepsis treatment initiated: Yes    Per the ED Provider, Time Zero for severe sepsis or septic shock is:      3 Hour Severe Sepsis Bundle Completion:  1. Initial Lactic Acid Result:   Recent Labs   Lab Test 09/22/24  0338 09/22/24  0144 08/27/19  0403   LACT 2.9* 4.0* 0.5*     2. Blood Cultures before Antibiotics: Yes  3. Broad Spectrum Antibiotics  Administered:     Anti-infectives (From now, onward)      Start     Dose/Rate Route Frequency Ordered Stop    09/22/24 0320  meropenem (MERREM) 1 g vial to attach to  mL bag         1 g  over 30 Minutes Intravenous ONCE 09/22/24 0319            4. 2000 ml of IV fluids have been given so far      6 Hour Severe Sepsis Bundle Completion:    1. Repeat Lactic Acid Level: Last result   Lab Results   Component Value Date    LACT 2.9 (H) 09/22/2024     2. Patient currently on Vasopressors =  No    Cares/treatment/interventions/medications to be completed following ED care:     ED Nurse Name: Lisa Grijalva RN  4:03 AM

## 2024-09-22 NOTE — H&P
[facility]  Hospitalist Admission Note  Name: Luz Thompson    MRN: [id number]  YOB: 1950    Age: 74 year old  Date of admission: 9/22/2024  Primary care provider: No Ref-Primary, Physician    Chief Complaint:  nausea, weakness    Assessment and Plan:   Septic shock secondary to UTI  Hx ESBL klebsiella  Multiple antibiotic allergies: Presenting with nausea, vomiting, urinary frequency, and weakness today concerning for UTI which she has had many of in the past.  Most recent urine culture 1 month ago was positive for ESBL Klebsiella oxytoca.  Follows with infectious disease in [place], but now moved back here and her previous ID doctor has retired so she needs a new one.  She has numerous antibiotic allergies including ciprofloxacin, penicillin, cephalosporin, azithromycin, doxycycline, fluconazole, but she has tolerated meropenem in the past.  In the ER she is tachycardic, temperature of 100.5  F, and has lactic acid of 4.0 consistent with severe sepsis.  Her WBC is normal at 5.1.  Procalcitonin 0.13.  UA shows 116 WBCs and moderate LE as source for infection.  COVID-19, influenza A/B, RSV PCR is negative.  Chest x-ray does not show any infiltrate.  She does have lymphedema with some warm erythema to the left shin greater than the right, but this looks more like venous stasis than cellulitis.  -IV meropenem renally dosed at 1 g every 12 hours  -Consult infectious disease  -Blood and urine cultures obtained  Addendum: After after meeting the patient while boarding awaiting for bed upstairs within a few hours she developed hypotension down to 83/40.  Follow-up lactic acid remained elevated 2.9 after 2.5 L total IV fluid boluses.  She received an additional 1 L LR, but blood pressure remained low at 92/44.  Central line placed by Dr. Lugo and she will be starting on IV norepinephrine.  Changed to ICU status for septic shock.      S/p liver transplant  Chronic immunosuppression: She  has history of liver transplant 2002 secondary to primary biliary cirrhosis.  She is chronically on prednisone 10 mg daily, sirolimus 1 mg daily, and ursodiol 250 mg twice daily that will be resumed while here.    CKD stage IIIb: Has had a variable creatinine this year ranging from 1.5 up to 1.9 at times.  In the ER her creatinine is 1.73.  Received 2.5 L NS bolus for sepsis.  -Holding PTA furosemide  -BMP in the morning  -Resume PTA calcitriol    Lymphedema  Paroxysmal A-fib  HTN  HLD  Mild to moderate mitral regurgitation  History CVA: She has lymphedema with trace-1+ bilateral lower extremity pitting edema on exam with some wrinkling of the skin.  She has paroxysmal A-fib and is on Eliquis 2.5 mg twice daily and metoprolol succinate 25 mg daily with additional pill in pocket strategy, hydralazine 25 mg daily as needed for SBP greater than 160, Zetia 10 mg daily, and furosemide 20 mg daily.  She has a loop recorder in place.  She had history of multiple tiny likely watershed infarcts in the setting of hypotension in 2001.  -Resume Eliquis, metoprolol, hydralazine as needed, and Zetia  -Hold furosemide initially due to sepsis  -Strict intake and output and daily weights    Generalized weakness: Secondary to acute infection.  Lives in independent living facility.  Uses a cane in the home and a walker when she is out.  -Consult PT/SW    Type II DM: PTA on linagliptin 5 mg daily.  A1c was 6.9 in April.  -Resume linagliptin  -Medium dose sliding scale aspart    Chronic back and left knee pain: PTA on gabapentin 300 mg at bedtime.  Says she is missing an orthopedic appointment today due to admission.  -Resume PTA gabapentin.  Also ordered acetaminophen for mild pain and oral Dilaudid 1 mg for moderate and 2 mg for severe pain every 4 hours as needed    Hypothyroidism: Previous thyroid cancer and thyroidectomy.  Resume PTA levothyroxine 175 mcg daily.    Sjogren syndrome    DVT Prophylaxis: PTA Eliquis  Code Status: Full  Code  FEN: regular diet  Discharge Dispo: lives in [facility]. Consult PT/SW for weakness  Estimated Disch Date / # of Days until Disch: Admit inpatient for severe sepsis.  Anticipate 2 or 3 night hospitalization.  Addendum: Changed to ICU status for septic shock and norepinephrine initiation.  More likely 3-5 night hospitalization.      History of Present Illness:  Luz Thompson is a 74 year old female with PMH including PBC s/p liver transplant in 2002 on chronic immunosuppression, ESBL/VRE infections, frequent UTIs, paroxysmal A-fib on Eliquis with loop recorder in place, DVT, DM 2, lymphedema, HTN, HLD, CVA, hypothyroidism, Sjogren syndrome, anxiety, glaucoma, anemia, chronic back pain, CKD stage IIIb, and osteoporosis who presents from her independent living facility due to nausea, vomiting, and weakness concerning for possible recurrent UTI.  She has been living in [place] for the last 6 years and moved back care a couple of months ago and is resuming care with multiple specialists.  Earlier today she was nauseous and had some vomiting.  She felt overall weak and had some urinary frequency concerning for UTI which she has had many of in the past.  She confirms she has numerous antibiotic allergies and usually receives meropenem which she tolerates.  She does report back pain although she has chronic back and left knee pain.  She denies any dysuria or hematuria although often does not have the symptoms with her UTIs.  She does have chronic leg edema which is at baseline.  Denies any new cough, chest pain, shortness of breath, or diarrhea.    History obtained from patient, medical record, and from Dr. Lugo in the emergency department.  Blood pressure 179/106 with repeat 159/82, tachycardic at 118, temperature 100.5  F, oxygen 99% on room air.  Initial lactic acid elevated at 4.0.  WBC 5.1, hemoglobin 12.9, platelet count 383.  Creatinine is 1.73 otherwise BMP unremarkable.  Glucose is 182.  LFTs within normal  "limits.  Procalcitonin 0.13.  UA shows 116 WBCs and moderate LE.  COVID-19, influenza A/B, RSV PCR is negative.  Chest x-ray does not show any infiltrate.  EKG shows sinus tachycardia.  She received IV meropenem, gabapentin, acetaminophen 1 g, and 2 L NS bolus.  Repeat lactic acid is still elevated at 2.9 so she is receiving additional 500 mL of NS now.  Admit inpatient.  Addendum: Became hypotensive to 83/40 lactic acid still elevated 2.9.  Received additional 1 L LR, but remains hypotensive the low 90s systolic.  Central line placed.  Starting on norepinephrine and admitting to ICU.       Clinically Significant Risk Factors Present on Admission             # Anion Gap Metabolic Acidosis: Highest Anion Gap = 20 mmol/L in last 2 days, will monitor and treat as appropriate   # Drug Induced Coagulation Defect: home medication list includes an anticoagulant medication    # Hypertension: Noted on problem list        # DMII: A1C = 6.9 % (Ref range: <=5.7 %) within past 6 months    # Overweight: Estimated body mass index is 26.7 kg/m  as calculated from the following:    Height as of 8/23/24: 1.715 m (5' 7.5\").    Weight as of 9/9/24: 78.5 kg (173 lb).                        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     Deep venous thrombosis     Diverticulosis of sigmoid colon 12/21/2013    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, history of     Received Rituxan during Summer of 2016    Glaucoma     H/O esophageal varices     Hearing loss     Hyperlipidemia 04/10/2012    Says that she does not have it anymore, not on meds    Hypertension     " Hypertriglyceridemia     Liver replaced by transplant (H) 10/17/2011    Dr. 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     Past Surgical History reviewed:  Past Surgical History:   Procedure Laterality Date    APPENDECTOMY  1961    CATARACT IOL, RT/LT      RE12/19/2013, LE12/10/2013 - Toric lenses    CHOLECYSTECTOMY  1991    COLONOSCOPY  03/10/2014    Procedure: COLONOSCOPY;;  Surgeon: Gentry Ramirez MD;  Location:  GI    CYSTOSCOPY      ear drum repair      ENDOBRONCHIAL ULTRASOUND FLEXIBLE N/A 09/29/2017    Procedure: ENDOBRONCHIAL ULTRASOUND FLEXIBLE;  Flexible Bronchoscopy, Endobronchial Ultrasound, Transbronchial Needle Aspiration ;  Surgeon: Eden Clinton MD;  Location: [facility] OR    ENDOSCOPIC RETROGRADE CHOLANGIOPANCREATOGRAM  09/19/2013    Procedure: ENDOSCOPIC RETROGRADE CHOLANGIOPANCREATOGRAM;  Endoscopic Retrograde Cholangiopancreatogram with single balloon enteroscopy, ballon sweep of bile duct;  Surgeon: Brett Membreno MD;  Location: [facility] OR     KNEE SCOPE,MED/LAT MENISECTOMY Right 08/10/2012    partial medial menisectomy only    KNEE SURGERY  1966    R knee    PICC INSERTION  09/18/2013    4fr SL PASV PICC, 40cm (1cm external) in the R basilic vein w/ tip in the low SVC    PICC INSERTION  02/21/2014    5 fr DL BioFlo Navilyst PICC, 46 cm (3 cm external) in the L basilic vein w/ tip in the SVC RA junction.    THYROIDECTOMY  03/2010    TRANSPLANT LIVER RECIPIENT LIVING UNRELATED  05/2002     Social History  "reviewed:  Social History     Tobacco Use    Smoking status: Former     Current packs/day: 0.00     Average packs/day: 1 pack/day for 18.0 years (18.0 ttl pk-yrs)     Types: Cigarettes     Start date: 1967     Quit date: 1985     Years since quittin.4    Smokeless tobacco: Never   Substance Use Topics    Alcohol use: Yes     Alcohol/week: 0.0 standard drinks of alcohol     Comment: rare - \"I toast at weddings\"     Social History     Social History Narrative    She is retired. She lives in the [facility] [place] the course of the [place]. [place] with hogs, cows, corn and soybean crops.     Family History reviewed:  Family History   Problem Relation Age of Onset    Hypertension Mother     Endometrial Cancer Mother     Hyperlipidemia Mother     Prostate Cancer Father     Macular Degeneration Father     Cancer - colorectal Maternal Grandmother         in her 80's, has surgery and removal of part of kidney,  at age 98    Heart Disease Maternal Grandfather          at 98    Glaucoma Maternal Grandfather     Cerebrovascular Disease Paternal Grandmother         in her 80's    Hypertension Paternal Grandmother     Heart Disease Paternal Grandfather         MI    Alzheimer Disease Paternal Grandfather     Allergies Son     Neurologic Disorder Daughter         Migraines    Breast Cancer Other     Anesthesia Reaction No family hx of     Crohn's Disease No family hx of     Ulcerative Colitis No family hx of      Allergies:  Allergies   Allergen Reactions    Fluconazole Hives and Itching     Full body hives      Mycophenolate Diarrhea and Nausea and Vomiting     Patient stated it was chronic and lasted months      Penicillins Rash, Anaphylaxis, Hives and Itching    Simvastatin Muscle Pain (Myalgia)     severe  Other reaction(s): Myalgia caused by statin    Methotrexate Other (See Comments)     Other reaction(s): Sore  Sores in mouth, esophagus, and " stomach.       Morphine And Codeine Itching and Other (See Comments)     Psych disturbance  Other reaction(s): Confusion, Mood alteration    Quinolones Anxiety, Dizziness, Headache, Other (See Comments), Palpitations and Unknown     Other reaction(s): Hyperactive behavior, Lightheadedness, Mood alteration    Dizzy, light headed    Dizziness, shaky, and jumpy    Benadryl [Diphenhydramine Hcl]      Insomnia     Capsules, Empty Gelatin [Gelatin]     Cephalosporins Itching    Ciprofloxacin Hcl Dizziness and Other (See Comments)    Lansoprazole Diarrhea    Azithromycin Itching    Doxycycline Itching and Unknown    Lisinopril Cough    Omeprazole Itching    Tolectin [Nsaids] Rash    Tolmetin Rash and Itching    Tramadol Rash, Hives and Itching     Medications:  Prior to Admission medications    Medication Sig Last Dose Taking? Auth Provider Long Term End Date   apixaban ANTICOAGULANT (ELIQUIS ANTICOAGULANT) 2.5 MG tablet Take 1 tablet (2.5 mg) by mouth 2 times daily   Taniya Wolff, APRN CNP No    azelastine (ASTELIN) 0.1 % nasal spray azelastine 137 mcg (0.1 %) nasal spray aerosol   Spray 1 spray every day by nasal route for 90 days.   Reported, Patient     calcitRIOL (ROCALTROL) 0.25 MCG capsule Take 1 capsule (0.25 mcg) by mouth daily.   Rachel Masterson MBBS Yes    COMPOUNDED NON-CONTROLLED SUBSTANCE (CMPD RX) - PHARMACY TO MIX COMPOUNDED MEDICATION Twice a day x7 days   Edwina Riddle PA-C     estradiol (ESTRACE) 0.1 MG/GM vaginal cream Place 2 g vaginally twice a week   Aliya Pace MD No    evolocumab (REPATHA SURECLICK) 140 MG/ML prefilled autoinjector Inject 1 mL (140 mg) subcutaneously every 14 days. . Please have fasting labs drawn for further refills.   Kirill Zuluaga MD     ezetimibe (ZETIA) 10 MG tablet TAKE 1 TABLET EVERY DAY   Britt Oswald NP Yes    Ferrous Sulfate 324 MG TBEC Take 1 tablet twice a day by oral route.   Reported, Patient     folic acid (FOLVITE) 1 MG tablet  Take 1 tablet (1 mg) by mouth daily   Gentry Ramirez MD     furosemide (LASIX) 20 MG tablet Take 1 tablet (20 mg) by mouth daily   Aliya Pace MD Yes    gabapentin (NEURONTIN) 250 MG/5ML solution Take 6 mLs (300 mg) by mouth at bedtime   Gentry Ramirez MD Yes    glucose (BD GLUCOSE) 5 g chewable tablet Take 2 tablets (10 g) by mouth as needed (low blood sugar)   Jennifer Barraza MD     hydrALAZINE (APRESOLINE) 25 MG tablet Take 1 tablet (25 mg) by mouth as needed (systolic bp > 160).   Kirill Zuluaga MD Yes    hypromellose (ARTIFICIAL TEARS) 0.4 % SOLN ophthalmic solution Apply 1 drop to eye every hour as needed for dry eyes   Aliya Pace MD     levothyroxine (SYNTHROID/LEVOTHROID) 175 MCG tablet Take 1 tablet (175 mcg) by mouth daily.   Rachel Masterson, MBBS Yes    linagliptin (TRADJENTA) 5 MG TABS tablet Take 1 tablet (5 mg) by mouth daily   Rach Vasquez MD Yes    meclizine (ANTIVERT) 25 MG tablet Take 1 tablet (25 mg) by mouth as needed for dizziness or other (migraines)   Aliya Pace MD     metoprolol succinate ER (TOPROL XL) 25 MG 24 hr tablet    Reported, Patient Yes    Multiple Vitamins-Minerals (PRESERVISION AREDS 2) CAPS Take 1 capsule by mouth 2 times daily   Aliya Pace MD No    omega-3 acid ethyl esters (LOVAZA) 1 g capsule Take 1 capsule by mouth 2 times daily   Britt Oswald NP     predniSONE (DELTASONE) 10 MG tablet Take 1 tablet (10 mg) by mouth daily.   Gentry Ramirez MD Yes    sirolimus (GENERIC EQUIVALENT) 1 MG tablet Take 1 tablet (1 mg) by mouth daily.   Gentry Ramirez MD No    sodium chloride 0.65 % nasal spray Spray 2 sprays in nostril   Reported, Patient     SUMAtriptan (IMITREX) 50 MG tablet Take 1 tablet (50 mg) by mouth at onset of headache for migraine repeat after 2 hours if needed.   Aliya Pace MD     ursodiol (ACTIGALL) 250 MG tablet Take 1 tablet (250 mg) by mouth 2 times daily   Gentry Ramirez MD        Review of Systems:  A Comprehensive greater than 10 system review of systems was carried out.  Pertinent positives and negatives are noted above.  Otherwise negative.     Physical Exam:  Blood pressure (!) 159/82, pulse 99, temperature (!) 100.5  F (38.1  C), temperature source Oral, resp. rate 11, last menstrual period 06/01/1988, SpO2 98%, not currently breastfeeding.  Wt Readings from Last 1 Encounters:   09/09/24 78.5 kg (173 lb)     Exam:  Constitutional: Awake, NAD  Eyes: sclera white   HEENT:  MMM  Respiratory: no respiratory distress, lungs cta bilaterally, no crackles or wheeze  Cardiovascular: RRR.  1/6 systolic murmur  GI: non-tender, not distended, bowel sounds present  Skin: Mild warm nonblanchable erythema to the left shin greater than the right  Musculoskeletal/extremities: Trace-1+ bilateral extreme edema  Neurologic: A&O, speech clear   Psychiatric: calm, cooperative, normal affect    Lab and imaging data personally reviewed:  Labs:  Recent Labs   Lab 09/22/24  0144   WBC 5.1   HGB 12.9   HCT 41.5   MCV 96        Recent Labs   Lab 09/22/24  0144      POTASSIUM 3.5   CHLORIDE 96*   CO2 23   ANIONGAP 20*   *   BUN 30.8*   CR 1.73*   GFRESTIMATED 30*   KEILY 10.1   PROTTOTAL 7.8   ALBUMIN 4.2   BILITOTAL 0.8   ALKPHOS 146   AST 31   ALT 26     Recent Labs   Lab 09/22/24  0338 09/22/24  0144   LACT 2.9* 4.0*     Recent Labs   Lab 09/22/24  0233   COLOR Light Yellow   APPEARANCE Clear   URINEGLC 50*   URINEBILI Negative   URINEKETONE Negative   SG 1.009   UBLD Negative   URINEPH 7.5*   PROTEIN 70*   NITRITE Negative   LEUKEST Moderate*   RBCU 2   WBCU 116*     Procalcitonin 0.13  COVID-19, influenza A/B, RSV PCR negative    EKG: Sinus tachycardia    Imaging:  Recent Results (from the past 24 hour(s))   XR Chest Port 1 View    Narrative    EXAM: XR CHEST PORT 1 VIEW  LOCATION: [facility]  DATE: 9/22/2024    INDICATION: Fever  COMPARISON: 8/31/2019       Impression    IMPRESSION: No consolidation or edema. No pleural effusion or pneumothorax. Normal heart size and pulmonary vascularity. Implanted electronic device projecting over the left chest.        German Casey MD  Hospitalist  [facility]

## 2024-09-22 NOTE — ED NOTES
Pt noted to have erythema and edema to bilateral lower legs. Denies tenderness.   Pt states she is allergic to most antibiotics but is not allergic to meropenem.   Pt c/o back pain. Repositioned in bed and given heat packs. Pt now sleeping

## 2024-09-22 NOTE — PROGRESS NOTES
"[facility]    Medicine Progress Note - Hospitalist Service    Date of Admission:  9/22/2024    Assessment & Plan   Luz Thompson is a 74 year old female who presented to the ED on 9/22/2024 with concern for feeling weak and nauseated. She was concerned about a UTI.  Of major concern, she rather abruptly became hypotensive in the ED for which reason she had a central line placed and was admitted to the ICU.      PMH is notable for PBC s/p liver transplant in 2002 on chronic immunosuppression, ESBL/VRE infections, frequent UTIs, paroxysmal A-fib on Eliquis with loop recorder in place, DVT, DM 2, lymphedema, HTN, HLD, CVA, hypothyroidism, Sjogren syndrome, anxiety, glaucoma, anemia, chronic back pain, CKD stage IIIb, and osteoporosis.     \" Per Blood pressure 179/106 with repeat 159/82, tachycardic at 118, temperature 100.5  F, oxygen 99% on room air.  Initial lactic acid elevated at 4.0.  WBC 5.1, hemoglobin 12.9, platelet count 383.  Creatinine is 1.73 otherwise BMP unremarkable.  Glucose is 182.  LFTs within normal limits.  Procalcitonin 0.13.  UA shows 116 WBCs and moderate LE.  COVID-19, influenza A/B, RSV PCR is negative.  Chest x-ray does not show any infiltrate.  EKG shows sinus tachycardia.  She received IV meropenem, gabapentin, acetaminophen 1 g, and 2 L NS bolus.  Repeat lactic acid is still elevated at 2.9 so she is receiving additional 500 mL of NS now.  Admit inpatient.  Addendum: Became hypotensive to 83/40 lactic acid still elevated 2.9.  Received additional 1 L LR, but remains hypotensive the low 90s systolic.  Central line placed.  Starting on norepinephrine and admitting to ICU.\"        Diagnoses:  Septic shock thought due to UTI. Required peripheral norepi briefly but is now stable  without pressors.   Immunosuppressed on Sirolimus and prednisone for Liver transplant (2007).   Hs ESBL Klebsiella.  Pt also has multiple antibiotic allergies, though no anaphylaxis recorded. " "  CKD stage 3B-4.  NIDDM.  Paroxysmal A fib, poss atrial tachycardia with SSS.  More often with issues with tachycardia.  HAs an implanted monitor ([employer]tr[employer] Linq 2 device). Managed with rate-control strategy.   Hypothyroidism.   PLAN:  Cont with meropenem.   ID has been consulted.   Has already weaned off of Norepi.  Although the pt is only on Prednisone 10 mg daily at baseline, this has been lifelong, I will start empiric stress-dose steroids: hydrocortisone 50 mg IV q 8h x 1 day, then resume Prednisone 10 mg if she's still stable tomorrow.   Insulin sliding scale.   Usual home meds reviewed.           Diet: Combination Diet Regular Diet Adult    DVT Prophylaxis: DOAC  Ron Catheter: Not present  Lines: PRESENT             Cardiac Monitoring: ACTIVE order. Indication: ICU  Code Status: Full Code      Clinically Significant Risk Factors Present on Admission        # Hypokalemia: Lowest K = 3.2 mmol/L in last 2 days, will replace as needed   # Hypocalcemia: Lowest Ca = 8.2 mg/dL in last 2 days, will monitor and replace as appropriate    # Anion Gap Metabolic Acidosis: Highest Anion Gap = 20 mmol/L in last 2 days, will monitor and treat as appropriate   # Drug Induced Coagulation Defect: home medication list includes an anticoagulant medication    # Hypertension: Noted on problem list   # Circulatory Shock: required vasopressors within past 24 hours         # DMII: A1C = 6.9 % (Ref range: <=5.7 %) within past 6 months    # Overweight: Estimated body mass index is 27.2 kg/m  as calculated from the following:    Height as of 8/23/24: 1.715 m (5' 7.5\").    Weight as of this encounter: 80 kg (176 lb 4.8 oz).              Disposition Plan     Medically Ready for Discharge: Anticipated in 2-4 Days             Herman Barahona MD  Hospitalist Service    Securely message with [employer] (more info)  Text page via [employer] Paging/Directory "   ______________________________________________________________________    Interval History   Chart reviewed, pt interviewed.     Ms Thompson is now feeling markedly improved. She is off of Norepi. Is aware that she will likely be here for several days for antibiotics pending ID of the organism.     Endorses limited ROM of both shoulders, but insists that she is independent and is not ready to go to assisted living.     Physical Exam   Vital Signs: Temp: 98.9  F (37.2  C) Temp src: Oral BP: 99/52 Pulse: 78   Resp: 16 SpO2: 97 % O2 Device: Nasal cannula Oxygen Delivery: 2 LPM  Weight: 176 lbs 4.8 oz    General Appearance: Alert, coherent in NAD. Word choice and thought processes intact.   Respiratory: no increased WOB  Cardiovascular: RRR without murmur  GI: soft, NTND.    Skin: no lesions. Trace edema bilat LEs. No erythema  Other:      Medical Decision Making       Non-billable MINUTES SPENT BY ME on the date of service doing chart review, history, exam, documentation & further activities per the note.      Data   Results for orders placed or performed during the hospital encounter of 09/22/24 (from the past 24 hour(s))   EKG 12 lead   Result Value Ref Range    Systolic Blood Pressure  mmHg    Diastolic Blood Pressure  mmHg    Ventricular Rate 111 BPM    Atrial Rate 111 BPM    SC Interval 176 ms    QRS Duration 68 ms     ms    QTc 435 ms    P Axis 73 degrees    R AXIS 23 degrees    T Axis 57 degrees    Interpretation ECG       Sinus tachycardia  Otherwise normal ECG  When compared with ECG of 07-Aug-2019 11:05,  Vent. rate has increased by  44 bpm     CBC + differential    Narrative    The following orders were created for panel order CBC + differential.  Procedure                               Abnormality         Status                     ---------                               -----------         ------                     CBC with platelets and d...[[id number]]  Abnormal            Final result                  Please view results for these tests on the individual orders.   Comprehensive metabolic panel   Result Value Ref Range    Sodium 139 135 - 145 mmol/L    Potassium 3.5 3.4 - 5.3 mmol/L    Carbon Dioxide (CO2) 23 22 - 29 mmol/L    Anion Gap 20 (H) 7 - 15 mmol/L    Urea Nitrogen 30.8 (H) 8.0 - 23.0 mg/dL    Creatinine 1.73 (H) 0.51 - 0.95 mg/dL    GFR Estimate 30 (L) >60 mL/min/1.73m2    Calcium 10.1 8.8 - 10.4 mg/dL    Chloride 96 (L) 98 - 107 mmol/L    Glucose 192 (H) 70 - 99 mg/dL    Alkaline Phosphatase 146 40 - 150 U/L    AST 31 0 - 45 U/L    ALT 26 0 - 50 U/L    Protein Total 7.8 6.4 - 8.3 g/dL    Albumin 4.2 3.5 - 5.2 g/dL    Bilirubin Total 0.8 <=1.2 mg/dL   Lactic acid whole blood   Result Value Ref Range    Lactic Acid 4.0 (HH) 0.7 - 2.0 mmol/L   Extra Tube ([place] Draw)    Narrative    The following orders were created for panel order Extra Tube ([place] Draw).  Procedure                               Abnormality         Status                     ---------                               -----------         ------                     Extra Blue Top Tube[[id number]]                              Final result               Extra Red Top Tube[[id number]]                               Final result                 Please view results for these tests on the individual orders.   CBC with platelets and differential   Result Value Ref Range    WBC Count 5.1 4.0 - 11.0 10e3/uL    RBC Count 4.33 3.80 - 5.20 10e6/uL    Hemoglobin 12.9 11.7 - 15.7 g/dL    Hematocrit 41.5 35.0 - 47.0 %    MCV 96 78 - 100 fL    MCH 29.8 26.5 - 33.0 pg    MCHC 31.1 (L) 31.5 - 36.5 g/dL    RDW 15.2 (H) 10.0 - 15.0 %    Platelet Count 383 150 - 450 10e3/uL    % Neutrophils 92 %    % Lymphocytes 6 %    % Monocytes 1 %    % Eosinophils 0 %    % Basophils 0 %    % Immature Granulocytes 1 %    NRBCs per 100 WBC 0 <1 /100    Absolute Neutrophils 4.7 1.6 - 8.3 10e3/uL    Absolute Lymphocytes 0.3 (L) 0.8 - 5.3 10e3/uL    Absolute Monocytes 0.0 0.0  - 1.3 10e3/uL    Absolute Eosinophils 0.0 0.0 - 0.7 10e3/uL    Absolute Basophils 0.0 0.0 - 0.2 10e3/uL    Absolute Immature Granulocytes 0.1 <=0.4 10e3/uL    Absolute NRBCs 0.0 10e3/uL   Extra Blue Top Tube   Result Value Ref Range    Hold Specimen JIC    Extra Red Top Tube   Result Value Ref Range    Hold Specimen JIC    Procalcitonin   Result Value Ref Range    Procalcitonin 0.13 <0.50 ng/mL   Symptomatic Influenza A/B, RSV, & SARS-CoV2 PCR (COVID-19) Nose    Specimen: Nose; Swab   Result Value Ref Range    Influenza A PCR Negative Negative    Influenza B PCR Negative Negative    RSV PCR Negative Negative    SARS CoV2 PCR Negative Negative    Narrative    Testing was performed using the Xpert Xpress CoV2/Flu/RSV Assay on the Cepheid GeneXpert Instrument. This test should be ordered for the detection of SARS-CoV2, influenza, and RSV viruses in individuals with signs and symptoms of respiratory tract infection. This test is for in vitro diagnostic use under the [place] FDA for laboratories certified under CLIA to perform high or moderate complexity testing. This test has been [place] FDA cleared. A negative result does not rule out the presence of PCR inhibitors in the specimen or target RNA in concentration below the limit of detection for the assay. If only one viral target is positive but coinfection with multiple targets is suspected, the sample should be re-tested with another FDA cleared, approved, or authorized test, if coninfection would change clinical management. This test was validated by the [facility]. These laboratories are certified under the Clinical Laboratory Improvement Amendments of 1988 (CLIA-88) as qualified to perfom high complexity laboratory testing.   XR Chest Port 1 View    Narrative    EXAM: XR CHEST PORT 1 VIEW  LOCATION: [facility]  DATE: 9/22/2024    INDICATION: Fever  COMPARISON: 8/31/2019      Impression    IMPRESSION: No consolidation or edema. No  pleural effusion or pneumothorax. Normal heart size and pulmonary vascularity. Implanted electronic device projecting over the left chest.    UA with Microscopic   Result Value Ref Range    Color Urine Light Yellow Colorless, Straw, Light Yellow, Yellow    Appearance Urine Clear Clear    Glucose Urine 50 (A) Negative mg/dL    Bilirubin Urine Negative Negative    Ketones Urine Negative Negative mg/dL    Specific Gravity Urine 1.009 1.003 - 1.035    Blood Urine Negative Negative    pH Urine 7.5 (H) 5.0 - 7.0    Protein Albumin Urine 70 (A) Negative mg/dL    Urobilinogen Urine Normal Normal, 2.0 mg/dL    Nitrite Urine Negative Negative    Leukocyte Esterase Urine Moderate (A) Negative    Bacteria Urine Moderate (A) None Seen /HPF    RBC Urine 2 <=2 /HPF    WBC Urine 116 (H) <=5 /HPF    Squamous Epithelials Urine 1 <=1 /HPF   Lactic acid whole blood   Result Value Ref Range    Lactic Acid 2.9 (H) 0.7 - 2.0 mmol/L   Glucose by meter   Result Value Ref Range    GLUCOSE BY METER POCT 210 (H) 70 - 99 mg/dL   Lactic acid whole blood   Result Value Ref Range    Lactic Acid 2.9 (H) 0.7 - 2.0 mmol/L   XR Chest Port 1 View    Narrative    EXAM: XR CHEST PORT 1 VIEW  LOCATION: [facility]  DATE: 9/22/2024    INDICATION: Central line placement  COMPARISON: 9/22/2024      Impression    IMPRESSION: Left IJ approach central venous catheter tip at the upper SVC. No pneumothorax. Lungs clear. No pleural effusion. Stable cardiomediastinal silhouette. Left chest implanted electronic device.   Lactic acid whole blood   Result Value Ref Range    Lactic Acid 2.1 (H) 0.7 - 2.0 mmol/L   Basic metabolic panel   Result Value Ref Range    Sodium 140 135 - 145 mmol/L    Potassium 3.2 (L) 3.4 - 5.3 mmol/L    Chloride 104 98 - 107 mmol/L    Carbon Dioxide (CO2) 21 (L) 22 - 29 mmol/L    Anion Gap 15 7 - 15 mmol/L    Urea Nitrogen 28.7 (H) 8.0 - 23.0 mg/dL    Creatinine 1.69 (H) 0.51 - 0.95 mg/dL    GFR Estimate 31 (L) >60  mL/min/1.73m2    Calcium 8.2 (L) 8.8 - 10.4 mg/dL    Glucose 247 (H) 70 - 99 mg/dL   Glucose by meter   Result Value Ref Range    GLUCOSE BY METER POCT 231 (H) 70 - 99 mg/dL   Glucose by meter   Result Value Ref Range    GLUCOSE BY METER POCT 267 (H) 70 - 99 mg/dL   Glucose by meter   Result Value Ref Range    GLUCOSE BY METER POCT 206 (H) 70 - 99 mg/dL     *Note: Due to a large number of results and/or encounters for the requested time period, some results have not been displayed. A complete set of results can be found in Results Review.

## 2024-09-22 NOTE — ED TRIAGE NOTES
SERINA  from [facility] patient co of feeling weak and nausea. Pt states they think the have ab UTI. Pt given 4mg zofran en route. Redness and warmth on LE pt reports for bout a week. Pt immunosupressed for hx of liver transplant.

## 2024-09-22 NOTE — ED PROVIDER NOTES
Emergency Department Note      History of Present Illness     Chief Complaint   Nausea and Generalized Weakness    HPI   Luz Thompson is a 74 year old female with a history of hyperlipidemia, hypertension, CKD stage 3, liver transplant, and DVT anticoagulated on Eliquis who presents via with complaints of nausea and generalized weakness. Nurse with information from EMS reports that patient is from [facility]. She stated to EMS she believes she may have a UTI, and that she has increased urinary frequency, nausea, vomiting, and weakness. She received 4 mg Zofran en route.  Patient unable to provide much more history    History is limited due to patient's condition.     Independent Historian   Nurse with information from EMS.   EMS at bedside and provides report    Review of External Notes   I reviewed Care Everywhere and updated Epic.     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    CVA (cerebral vascular accident) (H) 2001    Deep venous thrombosis     Diverticulosis of sigmoid colon 12/21/2013    EBV (Waqsa-Barr virus) viremia, history of     Glaucoma     H/O esophageal varices     Hearing loss     Hyperlipidemia 04/10/2012    Hypertension     Hypertriglyceridemia     Liver replaced by transplant (H) 10/17/2011    Macular degeneration     Migraines 04/04/2012    Mumps, history of     Nonsenile cataract     Osteoarthritis of right knee 08/02/2012    Osteoporosis 04/20/2012    Paroxysmal atrial fibrillation 06/13/2017    Postablative hypothyroidism 08/13/2012    Primary biliary cirrhosis (H)     [facility] fever, history of     Sjogren's syndrome (H24)     Thyroid cancer 09/25/2012    Type 2 diabetes mellitus     Vitamin D deficiency 10/01/2012    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       Medications   apixaban ANTICOAGULANT (ELIQUIS ANTICOAGULANT) 2.5 MG tablet  azelastine (ASTELIN) 0.1 % nasal spray  calcitRIOL (ROCALTROL) 0.25 MCG capsule  COMPOUNDED NON-CONTROLLED SUBSTANCE (CMPD RX) - PHARMACY TO MIX COMPOUNDED MEDICATION  estradiol (ESTRACE) 0.1 MG/GM vaginal cream  evolocumab (REPATHA SURECLICK) 140 MG/ML prefilled autoinjector  ezetimibe (ZETIA) 10 MG tablet  Ferrous Sulfate 324 MG TBEC  folic acid (FOLVITE) 1 MG tablet  furosemide (LASIX) 20 MG tablet  gabapentin (NEURONTIN) 250 MG/5ML solution  glucose (BD GLUCOSE) 5 g chewable tablet  hydrALAZINE (APRESOLINE) 25 MG tablet  hypromellose (ARTIFICIAL TEARS) 0.4 % SOLN ophthalmic solution  levothyroxine (SYNTHROID/LEVOTHROID) 175 MCG tablet  linagliptin (TRADJENTA) 5 MG TABS tablet  meclizine (ANTIVERT) 25 MG tablet  metoprolol succinate ER (TOPROL XL) 25 MG 24 hr tablet  Multiple Vitamins-Minerals (PRESERVISION AREDS 2) CAPS  omega-3 acid ethyl esters (LOVAZA) 1 g capsule  predniSONE (DELTASONE) 10 MG tablet  sirolimus (GENERIC EQUIVALENT) 1 MG tablet  sodium chloride 0.65 % nasal spray  SUMAtriptan (IMITREX) 50 MG tablet  ursodiol (ACTIGALL) 250 MG tablet        Surgical History   Past Surgical History:   Procedure Laterality Date    APPENDECTOMY  1961    CATARACT IOL, RT/LT      RE12/19/2013, LE12/10/2013 - Toric lenses    CHOLECYSTECTOMY  1991    COLONOSCOPY  03/10/2014    Procedure: COLONOSCOPY;;  Surgeon: Gentry Ramirez MD;  Location:  GI    CYSTOSCOPY      ear drum repair      ENDOBRONCHIAL ULTRASOUND FLEXIBLE N/A 09/29/2017    Procedure: ENDOBRONCHIAL ULTRASOUND FLEXIBLE;  Flexible Bronchoscopy, Endobronchial Ultrasound, Transbronchial Needle Aspiration ;  Surgeon: Eden Clinton MD;  Location:  OR    ENDOSCOPIC RETROGRADE CHOLANGIOPANCREATOGRAM  09/19/2013    Procedure: ENDOSCOPIC RETROGRADE CHOLANGIOPANCREATOGRAM;  Endoscopic Retrograde Cholangiopancreatogram with single balloon enteroscopy, ballon sweep of  bile duct;  Surgeon: Brett Membreno MD;  Location: [facility] OR    HC KNEE SCOPE,MED/LAT MENISECTOMY Right 08/10/2012    partial medial menisectomy only    KNEE SURGERY  1966    R knee    PICC INSERTION  09/18/2013    4fr SL PASV PICC, 40cm (1cm external) in the R basilic vein w/ tip in the low SVC    PICC INSERTION  02/21/2014    5 fr DL BioFlo Navilyst PICC, 46 cm (3 cm external) in the L basilic vein w/ tip in the SVC RA junction.    THYROIDECTOMY  03/2010    TRANSPLANT LIVER RECIPIENT LIVING UNRELATED  05/2002       Physical Exam     Patient Vitals for the past 24 hrs:   BP Temp Temp src Pulse Resp SpO2   09/22/24 0337 -- 98.2  F (36.8  C) -- 89 15 100 %   09/22/24 0253 -- -- -- 99 11 98 %   09/22/24 0250 -- -- -- 100 -- --   09/22/24 0248 -- -- -- 101 12 99 %   09/22/24 0245 159/82 -- -- 104 23 --   09/22/24 0230  147/68 -- -- 100 12 --   09/22/24 0126  179/106  100.5  F (38.1  C) Oral 118 20 99 %     Physical Exam  Nursing note and vitals reviewed.  Constitutional: Tired appearing.  HENT:   Mouth/Throat: Very dry mucous membranes.   Cardiovascular: Tachycardic, regular rhythm and normal heart sounds.  No murmur.  Pulmonary/Chest: Effort normal and breath sounds normal. No respiratory distress. No wheezes. No rales.   Abdominal: Soft. Normal appearance and bowel sounds are normal. No distension. There is no tenderness. There is no rigidity and no guarding.   Musculoskeletal: Erythema to bilateral lower legs symmetrically on anterior shins  Neurological: Alert.  Oriented to self.  Strength 4 out of 5 globally  Skin: Skin is warm and dry.  See above  Psychiatric: Normal mood and affect.      Diagnostics     Lab Results   Labs Ordered and Resulted from Time of ED Arrival to Time of ED Departure   COMPREHENSIVE METABOLIC PANEL - Abnormal       Result Value    Sodium 139      Potassium 3.5      Carbon Dioxide (CO2) 23      Anion Gap 20 (*)     Urea Nitrogen 30.8 (*)     Creatinine 1.73 (*)     GFR Estimate 30 (*)      Calcium 10.1      Chloride 96 (*)     Glucose 192 (*)     Alkaline Phosphatase 146      AST 31      ALT 26      Protein Total 7.8      Albumin 4.2      Bilirubin Total 0.8     LACTIC ACID WHOLE BLOOD - Abnormal    Lactic Acid 4.0 (*)    CBC WITH PLATELETS AND DIFFERENTIAL - Abnormal    WBC Count 5.1      RBC Count 4.33      Hemoglobin 12.9      Hematocrit 41.5      MCV 96      MCH 29.8      MCHC 31.1 (*)     RDW 15.2 (*)     Platelet Count 383      % Neutrophils 92      % Lymphocytes 6      % Monocytes 1      % Eosinophils 0      % Basophils 0      % Immature Granulocytes 1      NRBCs per 100 WBC 0      Absolute Neutrophils 4.7      Absolute Lymphocytes 0.3 (*)     Absolute Monocytes 0.0      Absolute Eosinophils 0.0      Absolute Basophils 0.0      Absolute Immature Granulocytes 0.1      Absolute NRBCs 0.0     ROUTINE UA WITH MICROSCOPIC - Abnormal    Color Urine Light Yellow      Appearance Urine Clear      Glucose Urine 50 (*)     Bilirubin Urine Negative      Ketones Urine Negative      Specific Gravity Urine 1.009      Blood Urine Negative      pH Urine 7.5 (*)     Protein Albumin Urine 70 (*)     Urobilinogen Urine Normal      Nitrite Urine Negative      Leukocyte Esterase Urine Moderate (*)     Bacteria Urine Moderate (*)     RBC Urine 2      WBC Urine 116 (*)     Squamous Epithelials Urine 1     LACTIC ACID WHOLE BLOOD - Abnormal after 2 L IV fluid    Lactic Acid 2.9 (*)    PROCALCITONIN - Normal    Procalcitonin 0.13     INFLUENZA A/B, RSV, & SARS-COV2 PCR - Normal    Influenza A PCR Negative      Influenza B PCR Negative      RSV PCR Negative      SARS CoV2 PCR Negative     BLOOD CULTURE: Pending   URINE CULTURE: Pending     Imaging   XR Chest Port 1 View   Final Result   IMPRESSION: No consolidation or edema. No pleural effusion or pneumothorax. Normal heart size and pulmonary vascularity. Implanted electronic device projecting over the left chest.         EKG   ECG results from 09/22/24   EKG 12 lead      Value    Systolic Blood Pressure     Diastolic Blood Pressure     Ventricular Rate 111    Atrial Rate 111    RI Interval 176    QRS Duration 68        QTc 435    P Axis 73    R AXIS 23    T Axis 57    Interpretation ECG      Sinus tachycardia  Read by Dr Lugo 0147       Independent Interpretation   I independently reviewed the chest x-ray.  No infiltrate.    ED Course      Medications Administered   Medications   sodium chloride 0.9% BOLUS 500 mL (500 mLs Intravenous $New Bag 9/22/24 0335)   gabapentin (NEURONTIN) solution 300 mg (has no administration in time range)   meropenem (MERREM) 1 g vial to attach to  mL bag (1 g Intravenous $New Bag 9/22/24 0337)   sodium chloride 0.9% BOLUS 1,000 mL (0 mLs Intravenous Stopped 9/22/24 0335)   acetaminophen (TYLENOL) oral liquid 1,000 mg (1,000 mg Oral $Given 9/22/24 0201)   sodium chloride 0.9% BOLUS 1,000 mL (0 mLs Intravenous Stopped 9/22/24 0334)     Discussion of Management   0345   discussed the case with the hospital physician Dr. Casey    ED Course   ED Course as of 09/22/24 0158   Sun Sep 22, 2024   0147 I initially assessed the patient and obtained the history and physical exam.      Additional Documentation  None    Medical Decision Making / Diagnosis     RAMÍREZ Thompson is a 74 year old female who presents to the [facility] center with increasing confusion.  Workup consistent with likely urinary source infection.  She was quite dehydrated based on clinical exam.  Initial lactic acid was elevated at 4.0.  We have begun IV fluids with goal of 30 cc/kg to be infused.  Lactic is trending down after the first 2 L have been administered now at 2.9.  Chronic kidney disease is stable.  She is anticoagulated chronically.  She does have a history of liver transplant and is on her antirejection medications.  She has a significant number of allergies.  I was able to review some notes from urology in terms of prophylactic treatment for UTIs which it sounds  like the patient has declined to this point.  She was started on IV meropenem given her allergy profile and will be admitted to the hospitalist service.    Disposition   The patient was admitted to the hospital.     Diagnosis     ICD-10-CM    1. Fever in adult  R50.9       2. Status post liver transplantation (H)  Z94.4       3. Urinary tract infection without hematuria, site unspecified  N39.0       4. Elevated lactic acid level  R79.89       5. Chronic kidney disease, unspecified CKD stage  N18.9       6. Sepsis, due to unspecified organism, unspecified whether acute organ dysfunction present (H)  A41.9            Scribe Disclosure:  I, Brooklyn Canada, am serving as a scribe at 1:41 AM on 9/22/2024 to document services personally performed by Gentry Lugo MD based on my observations and the provider's statements to me.        Gentry Lugo MD  09/22/24 0358

## 2024-09-22 NOTE — ED NOTES
Rapid drop in blood pressure was noted. Dr Lugo notified and further orders received.   After 1L of LR given, b/p has improved. See flowsheet

## 2024-09-22 NOTE — CONSULTS
[facility]    Infectious Disease Consultation     Date of Admission:  9/22/2024  Date of Consult: 09/22/24    Assessment:  74YF with hx Sjogren's syndrome, of liver transplantation in 2002 for primary biliary cirrhosis -on immunosuppressive therapy with prednisone and sirolimus, CKD with recurrent urinary tract infections with MDRO including ESBL producers, who has been hospitalized due to nausea, vomiting, urinary frequency and weakness, was febrile and hypotensive , and has been admitted with sepsis likely of urinary source    -Septic shock  -Likely UTI  -DERREK superimposed on CKA, hypokalemia  -s/p liver transplantation, on immunosuppressive therapy with prednisone and sirolimus  -Multiple antibiotic allergies -PCN, cephalosporins, Azithromycin, Doxycycline, Quinolones  -Diabetes  -Chronic medical illnesses -atrial fibrillation, lymphedema, HTN, hyperlipidemia, mitral regurgitation, CVA    Recommendations:  Follow cx data  Continue Meropenem  Patient reports mouth numbness with all other antibiotics listed as allergies, no documented anaphylaxis  Recommendations were discussed with the hospitalist service    Iwona Alvares MD    Reason for Consult   Reason for consult: I was asked to evaluate this patient for sepsis of urinary source.    Primary Care Physician   Physician No Ref-Primary    Chief Complaint   Fever, N/V and urinary frequency,    History is obtained from the patient and medical records    History of Present Illness   Luz Thompson is a 74 year old female, with hx Sjogren's syndrome, of liver transplantation in 2002 for primary biliary cirrhosis -on immunosuppressive therapy with prednisone and sirolimus, CKD with recurrent urinary tract infections with MDRO including ESBL producers, who has been hospitalized due to nausea, vomiting, urinary frequency and weakness, was febrile and hypotensive , and has been admitted with sepsis likely of urinary source    Patient developed a  "fever with severe body pain and back pain and nausea, she required pressors briefly and has been off pressors now. She feels a lot better. She had a temperature of 100.5 on admission, was hypotensive and had elevated lactate meeting sepsis criteria    She has been initiated on Meropenem and ID has been asked to assist with antibiotic management.    Antimicrobial therapy  9/22 Meropenem    Past Medical History   I have reviewed this patient's medical history and updated it with pertinent information if needed.   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  Past Surgical History:   Procedure Laterality Date    APPENDECTOMY  1961    CATARACT IOL, RT/LT      RE12/19/2013, LE12/10/2013 - Toric lenses    CHOLECYSTECTOMY  1991    COLONOSCOPY  03/10/2014    Procedure: COLONOSCOPY;;  Surgeon: Gentry Ramirez MD;  Location: [facility] GI    CYSTOSCOPY      ear drum repair      ENDOBRONCHIAL ULTRASOUND FLEXIBLE N/A 09/29/2017    Procedure: ENDOBRONCHIAL ULTRASOUND FLEXIBLE;  Flexible Bronchoscopy, Endobronchial Ultrasound, Transbronchial Needle Aspiration ;  Surgeon: Eden Clinton MD;  Location: [facility] OR    ENDOSCOPIC RETROGRADE CHOLANGIOPANCREATOGRAM  09/19/2013    Procedure: ENDOSCOPIC RETROGRADE CHOLANGIOPANCREATOGRAM;  Endoscopic Retrograde Cholangiopancreatogram with single balloon enteroscopy, ballon sweep of bile duct;  Surgeon: Brett Membreno MD;  Location: [facility] OR     KNEE SCOPE,MED/LAT MENISECTOMY Right 08/10/2012    partial medial menisectomy only    KNEE SURGERY  1966    R knee    PICC INSERTION  09/18/2013    4fr SL PASV PICC, 40cm (1cm external) in the R basilic vein w/ tip in the low SVC    PICC INSERTION  02/21/2014    5 fr DL BioFlo Navilyst PICC, 46 cm (3 cm external) in the L basilic vein w/ tip in the SVC RA junction.    THYROIDECTOMY  03/2010    TRANSPLANT LIVER RECIPIENT LIVING UNRELATED  05/2002       Prior to Admission Medications   Prior to Admission Medications   Prescriptions Last Dose Informant Patient Reported? Taking?   D-MANNOSE PO 9/21/2024  Yes Yes   Sig: Take 1 Scoop by mouth 2 times daily.   Ferrous Sulfate 324 MG TBEC 9/21/2024  Yes Yes   Sig: Take 1 tablet twice a day by oral route.   Multiple Vitamins-Minerals (PRESERVISION AREDS 2) CAPS 9/21/2024  No Yes   Sig: Take 1 capsule by mouth 2 times daily   Nutrisource Fiber PO packet 9/21/2024  Yes Yes   Sig: Take 1 packet by mouth daily.   apixaban ANTICOAGULANT (ELIQUIS ANTICOAGULANT) 2.5 MG tablet 9/21/2024  No Yes   Sig: Take 1 tablet (2.5 mg) by mouth 2  times daily   azelastine (ASTELIN) 0.1 % nasal spray Unknown  Yes Yes   Sig: Spray 1 spray into both nostrils as needed for allergies or rhinitis.   calcitRIOL (ROCALTROL) 0.25 MCG capsule 9/21/2024  No Yes   Sig: Take 1 capsule (0.25 mcg) by mouth daily.   estradiol (ESTRACE) 0.1 MG/GM vaginal cream 9/21/2024  Yes Yes   Sig: Place vaginally every other day.   evolocumab (REPATHA SURECLICK) 140 MG/ML prefilled autoinjector Past Week  No Yes   Sig: Inject 1 mL (140 mg) subcutaneously every 14 days. . Please have fasting labs drawn for further refills.   ezetimibe (ZETIA) 10 MG tablet 9/21/2024  No Yes   Sig: TAKE 1 TABLET EVERY DAY   folic acid (FOLVITE) 1 MG tablet 9/21/2024  No Yes   Sig: Take 1 tablet (1 mg) by mouth daily   furosemide (LASIX) 20 MG tablet 9/21/2024  No Yes   Sig: Take 1 tablet (20 mg) by mouth daily   gabapentin (NEURONTIN) 250 MG/5ML solution 9/21/2024  No Yes   Sig: Take 6 mLs (300 mg) by mouth at bedtime   glucose (BD GLUCOSE) 5 g chewable tablet Past Month  No Yes   Sig: Take 2 tablets (10 g) by mouth as needed (low blood sugar)   hydrALAZINE (APRESOLINE) 25 MG tablet Past Week  Yes Yes   Sig: Take 1 tablet (25 mg) by mouth as needed (systolic bp > 160).   hypromellose (ARTIFICIAL TEARS) 0.4 % SOLN ophthalmic solution Unknown  No Yes   Sig: Apply 1 drop to eye every hour as needed for dry eyes   levothyroxine (SYNTHROID/LEVOTHROID) 175 MCG tablet 9/21/2024  No Yes   Sig: Take 1 tablet (175 mcg) by mouth daily.   linagliptin (TRADJENTA) 5 MG TABS tablet 9/21/2024  No Yes   Sig: Take 1 tablet (5 mg) by mouth daily   meclizine (ANTIVERT) 25 MG tablet More than a month  No Yes   Sig: Take 1 tablet (25 mg) by mouth as needed for dizziness or other (migraines)   metoprolol succinate ER (TOPROL XL) 25 MG 24 hr tablet 9/21/2024  Yes Yes   Sig: Take 25 mg by mouth daily.   omega-3 acid ethyl esters (LOVAZA) 1 g capsule 9/21/2024  No Yes   Sig: Take 1 capsule by mouth 2 times daily   predniSONE  (DELTASONE) 10 MG tablet 9/21/2024  No Yes   Sig: Take 1 tablet (10 mg) by mouth daily.   sirolimus (GENERIC EQUIVALENT) 1 MG tablet 9/21/2024  No Yes   Sig: Take 1 tablet (1 mg) by mouth daily.   ursodiol (ACTIGALL) 250 MG tablet 9/21/2024  No Yes   Sig: Take 1 tablet (250 mg) by mouth 2 times daily      Facility-Administered Medications: None     Allergies   Allergies   Allergen Reactions    Fluconazole Hives and Itching     Full body hives      Mycophenolate Diarrhea and Nausea and Vomiting     Patient stated it was chronic and lasted months      Penicillins Rash, Anaphylaxis, Hives and Itching    Simvastatin Muscle Pain (Myalgia)     severe  Other reaction(s): Myalgia caused by statin    Methotrexate Other (See Comments)     Other reaction(s): Sore  Sores in mouth, esophagus, and stomach.       Morphine And Codeine Itching and Other (See Comments)     Psych disturbance  Other reaction(s): Confusion, Mood alteration    Quinolones Anxiety, Dizziness, Headache, Other (See Comments), Palpitations and Unknown     Other reaction(s): Hyperactive behavior, Lightheadedness, Mood alteration    Dizzy, light headed    Dizziness, shaky, and jumpy    Benadryl [Diphenhydramine Hcl]      Insomnia     Capsules, Empty Gelatin [Gelatin]     Cephalosporins Itching    Ciprofloxacin Hcl Dizziness and Other (See Comments)    Lansoprazole Diarrhea    Azithromycin Itching    Doxycycline Itching and Unknown    Lisinopril Cough    Omeprazole Itching    Tolectin [Nsaids] Rash    Tolmetin Rash and Itching    Tramadol Rash, Hives and Itching       Immunization History   Immunization History   Administered Date(s) Administered    COVID-19 MONOVALENT 12+ (Pfizer) 02/21/2021, 03/15/2021, 10/09/2021    G9a0-32 Novel Flu P-free 11/10/2009    Influenza (High Dose) Trivalent,PF (Fluzone) 09/25/2015, 10/30/2016, 10/23/2017    Influenza (IIV3) PF 11/10/2004, 09/29/2007, 10/01/2010, 09/27/2012, 10/29/2012, 09/30/2013, 09/01/2016    Influenza Vaccine  18-64 (Flublok) 10/20/2018    Pneumo Conj 13-V (2010&after) 2014    Pneumococcal 23 valent 2007, 2016    TD,PF 7+ (Tenivac) 2007    TDAP Vaccine (Adacel) 2007, 2014       Social History   I have reviewed this patient's social history and updated it with pertinent information if needed. Luz Thompson  reports that she quit smoking about 39 years ago. Her smoking use included cigarettes. She started smoking about 57 years ago. She has a 18 pack-year smoking history. She has never used smokeless tobacco. She reports current alcohol use. She reports that she does not use drugs.    Family History   I have reviewed this patient's family history and updated it with pertinent information if needed.   Family History   Problem Relation Age of Onset    Hypertension Mother     Endometrial Cancer Mother     Hyperlipidemia Mother     Prostate Cancer Father     Macular Degeneration Father     Cancer - colorectal Maternal Grandmother         in her 80's, has surgery and removal of part of kidney,  at age 98    Heart Disease Maternal Grandfather          at 98    Glaucoma Maternal Grandfather     Cerebrovascular Disease Paternal Grandmother         in her 80's    Hypertension Paternal Grandmother     Heart Disease Paternal Grandfather         MI    Alzheimer Disease Paternal Grandfather     Allergies Son     Neurologic Disorder Daughter         Migraines    Breast Cancer Other     Anesthesia Reaction No family hx of     Crohn's Disease No family hx of     Ulcerative Colitis No family hx of        Review of Systems   The 10 point Review of Systems is as per HPI    Physical Exam   Temp: 97.1  F (36.2  C) Temp src: Temporal BP: 109/59 Pulse: 77   Resp: 12 SpO2: 94 % O2 Device: Nasal cannula Oxygen Delivery: 2 LPM  Vital Signs with Ranges  Temp:  [97.1  F (36.2  C)-100.5  F (38.1  C)] 97.1  F (36.2  C)  Pulse:  [] 77  Resp:  [11-23] 12  BP: ()/() 109/59  SpO2:  [92 %-100  %] 94 %  177 lbs 14.4 oz  Body mass index is 27.86 kg/m .    GENERAL APPEARANCE:  awake  EYES: Eyes grossly normal to inspection  NECK: no adenopathy  RESP: lungs clear   CV: regular rates and rhythm  ABDOMEN: soft, nontender  MS: extremities normal  SKIN: no suspicious lesions or rashes      Data   All laboratory data reviewed  Component      Latest Ref [facility] 9/22/2024  7:10 AM   Sodium      135 - 145 mmol/L 140    Potassium      3.4 - 5.3 mmol/L 3.2 (L)    Chloride      98 - 107 mmol/L 104    Carbon Dioxide (CO2)      22 - 29 mmol/L 21 (L)    Anion Gap      7 - 15 mmol/L 15    Urea Nitrogen      8.0 - 23.0 mg/dL 28.7 (H)    Creatinine      0.51 - 0.95 mg/dL 1.69 (H)    GFR Estimate      >60 mL/min/1.73m2 31 (L)    Calcium      8.8 - 10.4 mg/dL 8.2 (L)    Glucose      70 - 99 mg/dL 247 (H)    Lactic Acid      0.7 - 2.0 mmol/L 2.1 (H)       Component      Latest Ref [facility] 9/22/2024  1:44 AM   Procalcitonin      <0.50 ng/mL 0.13      Component      Latest Ref [facility] 9/22/2024  1:44 AM   WBC      4.0 - 11.0 10e3/uL 5.1    RBC Count      3.80 - 5.20 10e6/uL 4.33    Hemoglobin      11.7 - 15.7 g/dL 12.9    Hematocrit      35.0 - 47.0 % 41.5    MCV      78 - 100 fL 96    MCH      26.5 - 33.0 pg 29.8    MCHC      31.5 - 36.5 g/dL 31.1 (L)    RDW      10.0 - 15.0 % 15.2 (H)    Platelet Count      150 - 450 10e3/uL 383       Microbiology  09/22/2024 0233 09/22/2024 0320 Urine Culture [[id number]]   Urine, NOS    In process Component Value   No component results             09/22/2024 0151 09/22/2024 0234 Symptomatic Influenza A/B, RSV, & SARS-CoV2 PCR (COVID-19) Nose [[id number]]    Swab from Nose    Final result Component Value   Influenza A PCR Negative   Influenza B PCR Negative   RSV PCR Negative   SARS CoV2 PCR Negative   NEGATIVE: SARS-CoV-2 (COVID-19) RNA not detected, presumed negative.          09/22/2024 0149 09/22/2024 0155 Blood Culture Peripheral Blood [[id number]]   Peripheral Blood    In process Component  Value   No component results         Imaging  EXAM: XR CHEST PORT 1 VIEW  LOCATION: [facility]  DATE: 9/22/2024     INDICATION: Central line placement  COMPARISON: 9/22/2024                                                                      IMPRESSION: Left IJ approach central venous catheter tip at the upper SVC. No pneumothorax. Lungs clear. No pleural effusion. Stable cardiomediastinal silhouette. Left chest implanted electronic device.

## 2024-09-22 NOTE — ED NOTES
Between the hours of 530 and 6 AM, the patient's blood pressure began trending downward with systolics in the 70s and 80s.  Additional liter of lactated Ringer's was ordered totaling 3.5 L.  Her blood pressure transiently improved but then began to downtrend once again.  Lactic acid was redrawn and shown to be persistently elevated at 2.9.  Physiology consistent with septic shock given persistent lactic acid elevation despite IV fluid resuscitation in the setting of low blood pressures.  Verbal consent was same for the patient and central line was placed as per the procedure note below.  Will start Levophed.  Hospitalist notified and patient will be admitted at this time to the ICU as opposed to the [facility] floor.    Critical care time: 30 minutes exclusive of procedures    Updated diagnoses:  (R50.9) Fever in adult  (Z94.4) Status post liver transplantation   (N39.0) Urinary tract infection without hematuria, site unspecified  (R79.89) Elevated lactic acid level  (N18.9) Chronic kidney disease, unspecified CKD stage  (A41.9,  R65.21) Septic shock     Procedure:      Central Line Placement     Procedure:  Central Venous Catheter Insertion     Indication: Vasopressor administration    Consent:  Verbal from Patient  Risk Discussed: Arterial puncture & stroke, incorrect placement, failure to place, bleeding or infection, pneumothorax, and alternative treatment with no central line    Universal Protocol: Universal protocol was followed and time out conducted just prior to starting procedure, confirming patient identity, site/side, procedure, patient position, and availability of correct equipment.    Anesthesia/Sedation: Lidocaine - 1%    Procedure Detail:    Central line bundle elements were complete including preparatory hand leansing, donning full barrier precautions, use of a full body drape and chlorhxidine gluconate skin prep.   The Left internal jugular vein was selected as the optimal location for patient s  condition.   Ultrasound was utilized for guidance: Yes.   A finder needle was used to enter the vein, through which a guidewire was inserted. The finder needle was then removed and the tract was dilated.   A triple lumen central venous catheter was inserted over the guidewire without difficulty. The guidewire was removed and the catheter was sutured in place and covered with an appropriate dressing.   A post-procedure chest radiograph was performed.     Patient Status:  The patient tolerated the procedure well: Yes. There were no complication.          Gentry Lugo MD  09/22/24 0640

## 2024-09-23 ENCOUNTER — APPOINTMENT (OUTPATIENT)
Dept: PHYSICAL THERAPY | Facility: CLINIC | Age: 74
DRG: 871 | End: 2024-09-23
Attending: INTERNAL MEDICINE
Payer: MEDICARE

## 2024-09-23 LAB
ANION GAP SERPL CALCULATED.3IONS-SCNC: 15 MMOL/L (ref 7–15)
ATRIAL RATE - MUSE: 111 BPM
BACTERIA UR CULT: ABNORMAL
BUN SERPL-MCNC: 32.5 MG/DL (ref 8–23)
CALCIUM SERPL-MCNC: 8.2 MG/DL (ref 8.8–10.4)
CHLORIDE SERPL-SCNC: 107 MMOL/L (ref 98–107)
CREAT SERPL-MCNC: 1.6 MG/DL (ref 0.51–0.95)
DIASTOLIC BLOOD PRESSURE - MUSE: NORMAL MMHG
EGFRCR SERPLBLD CKD-EPI 2021: 33 ML/MIN/1.73M2
ERYTHROCYTE [DISTWIDTH] IN BLOOD BY AUTOMATED COUNT: 15.5 % (ref 10–15)
GLUCOSE BLDC GLUCOMTR-MCNC: 193 MG/DL (ref 70–99)
GLUCOSE BLDC GLUCOMTR-MCNC: 203 MG/DL (ref 70–99)
GLUCOSE BLDC GLUCOMTR-MCNC: 223 MG/DL (ref 70–99)
GLUCOSE BLDC GLUCOMTR-MCNC: 242 MG/DL (ref 70–99)
GLUCOSE BLDC GLUCOMTR-MCNC: 264 MG/DL (ref 70–99)
GLUCOSE SERPL-MCNC: 237 MG/DL (ref 70–99)
HCO3 SERPL-SCNC: 20 MMOL/L (ref 22–29)
HCT VFR BLD AUTO: 32.5 % (ref 35–47)
HGB BLD-MCNC: 10.1 G/DL (ref 11.7–15.7)
INTERPRETATION ECG - MUSE: NORMAL
MCH RBC QN AUTO: 30.3 PG (ref 26.5–33)
MCHC RBC AUTO-ENTMCNC: 31.1 G/DL (ref 31.5–36.5)
MCV RBC AUTO: 98 FL (ref 78–100)
P AXIS - MUSE: 73 DEGREES
PLATELET # BLD AUTO: 284 10E3/UL (ref 150–450)
POTASSIUM SERPL-SCNC: 4.9 MMOL/L (ref 3.4–5.3)
POTASSIUM SERPL-SCNC: 4.9 MMOL/L (ref 3.4–5.3)
PR INTERVAL - MUSE: 176 MS
QRS DURATION - MUSE: 68 MS
QT - MUSE: 320 MS
QTC - MUSE: 435 MS
R AXIS - MUSE: 23 DEGREES
RBC # BLD AUTO: 3.33 10E6/UL (ref 3.8–5.2)
SODIUM SERPL-SCNC: 142 MMOL/L (ref 135–145)
SYSTOLIC BLOOD PRESSURE - MUSE: NORMAL MMHG
T AXIS - MUSE: 57 DEGREES
VENTRICULAR RATE- MUSE: 111 BPM
WBC # BLD AUTO: 17.2 10E3/UL (ref 4–11)

## 2024-09-23 PROCEDURE — 99232 SBSQ HOSP IP/OBS MODERATE 35: CPT | Performed by: INTERNAL MEDICINE

## 2024-09-23 PROCEDURE — 80048 BASIC METABOLIC PNL TOTAL CA: CPT | Performed by: INTERNAL MEDICINE

## 2024-09-23 PROCEDURE — 250N000011 HC RX IP 250 OP 636: Performed by: INTERNAL MEDICINE

## 2024-09-23 PROCEDURE — 120N000004 HC R&B MS OVERFLOW

## 2024-09-23 PROCEDURE — 250N000013 HC RX MED GY IP 250 OP 250 PS 637: Performed by: INTERNAL MEDICINE

## 2024-09-23 PROCEDURE — 250N000012 HC RX MED GY IP 250 OP 636 PS 637: Performed by: INTERNAL MEDICINE

## 2024-09-23 PROCEDURE — 97161 PT EVAL LOW COMPLEX 20 MIN: CPT | Mod: GP | Performed by: PHYSICAL THERAPIST

## 2024-09-23 PROCEDURE — 97530 THERAPEUTIC ACTIVITIES: CPT | Mod: GP | Performed by: PHYSICAL THERAPIST

## 2024-09-23 PROCEDURE — 85027 COMPLETE CBC AUTOMATED: CPT | Performed by: INTERNAL MEDICINE

## 2024-09-23 RX ORDER — CHLORAL HYDRATE 500 MG
1 CAPSULE ORAL 2 TIMES DAILY
Status: DISCONTINUED | OUTPATIENT
Start: 2024-09-23 | End: 2024-09-25 | Stop reason: HOSPADM

## 2024-09-23 RX ORDER — ESTRADIOL 0.1 MG/G
2 CREAM VAGINAL EVERY OTHER DAY
Status: DISCONTINUED | OUTPATIENT
Start: 2024-09-23 | End: 2024-09-25 | Stop reason: HOSPADM

## 2024-09-23 RX ORDER — METOPROLOL SUCCINATE 25 MG/1
25 TABLET, EXTENDED RELEASE ORAL DAILY
Status: DISCONTINUED | OUTPATIENT
Start: 2024-09-23 | End: 2024-09-25 | Stop reason: HOSPADM

## 2024-09-23 RX ORDER — FUROSEMIDE 20 MG
20 TABLET ORAL DAILY
Status: DISCONTINUED | OUTPATIENT
Start: 2024-09-23 | End: 2024-09-25 | Stop reason: HOSPADM

## 2024-09-23 RX ORDER — HYDRALAZINE HYDROCHLORIDE 25 MG/1
25 TABLET, FILM COATED ORAL DAILY PRN
Status: DISCONTINUED | OUTPATIENT
Start: 2024-09-23 | End: 2024-09-25 | Stop reason: HOSPADM

## 2024-09-23 RX ADMIN — ESTRADIOL 2 G: 0.1 CREAM VAGINAL at 14:04

## 2024-09-23 RX ADMIN — SIROLIMUS 1 MG: 0.5 TABLET, FILM COATED ORAL at 08:01

## 2024-09-23 RX ADMIN — LEVOTHYROXINE SODIUM 175 MCG: 0.17 TABLET ORAL at 08:01

## 2024-09-23 RX ADMIN — POTASSIUM CHLORIDE AND SODIUM CHLORIDE: 450; 150 INJECTION, SOLUTION INTRAVENOUS at 09:51

## 2024-09-23 RX ADMIN — EZETIMIBE 10 MG: 10 TABLET ORAL at 08:01

## 2024-09-23 RX ADMIN — FUROSEMIDE 20 MG: 20 TABLET ORAL at 11:20

## 2024-09-23 RX ADMIN — CALCITRIOL CAPSULES 0.25 MCG 0.25 MCG: 0.25 CAPSULE ORAL at 08:01

## 2024-09-23 RX ADMIN — PREDNISONE 10 MG: 10 TABLET ORAL at 07:58

## 2024-09-23 RX ADMIN — POTASSIUM CHLORIDE AND SODIUM CHLORIDE: 450; 150 INJECTION, SOLUTION INTRAVENOUS at 07:30

## 2024-09-23 RX ADMIN — GABAPENTIN 300 MG: 250 SUSPENSION ORAL at 22:17

## 2024-09-23 RX ADMIN — METOPROLOL SUCCINATE 25 MG: 25 TABLET, EXTENDED RELEASE ORAL at 11:20

## 2024-09-23 RX ADMIN — APIXABAN 2.5 MG: 2.5 TABLET, FILM COATED ORAL at 07:58

## 2024-09-23 RX ADMIN — FOLIC ACID 1 MG: 1 TABLET ORAL at 07:58

## 2024-09-23 RX ADMIN — SITAGLIPTIN 50 MG: 50 TABLET, FILM COATED ORAL at 08:00

## 2024-09-23 RX ADMIN — MEROPENEM 1 G: 1 INJECTION, POWDER, FOR SOLUTION INTRAVENOUS at 16:02

## 2024-09-23 RX ADMIN — HYDROCORTISONE SODIUM SUCCINATE 50 MG: 100 INJECTION, POWDER, FOR SOLUTION INTRAMUSCULAR; INTRAVENOUS at 02:09

## 2024-09-23 RX ADMIN — URSODIOL 250 MG: 250 TABLET ORAL at 07:59

## 2024-09-23 RX ADMIN — APIXABAN 2.5 MG: 2.5 TABLET, FILM COATED ORAL at 20:11

## 2024-09-23 RX ADMIN — URSODIOL 250 MG: 250 TABLET ORAL at 20:11

## 2024-09-23 RX ADMIN — HYDRALAZINE HYDROCHLORIDE 25 MG: 25 TABLET ORAL at 14:13

## 2024-09-23 RX ADMIN — MEROPENEM 1 G: 1 INJECTION, POWDER, FOR SOLUTION INTRAVENOUS at 04:20

## 2024-09-23 RX ADMIN — Medication 1 G: at 20:12

## 2024-09-23 ASSESSMENT — ACTIVITIES OF DAILY LIVING (ADL)
ADLS_ACUITY_SCORE: 37
ADLS_ACUITY_SCORE: 35
ADLS_ACUITY_SCORE: 35
ADLS_ACUITY_SCORE: 37
ADLS_ACUITY_SCORE: 35
ADLS_ACUITY_SCORE: 37
ADLS_ACUITY_SCORE: 37
ADLS_ACUITY_SCORE: 35
ADLS_ACUITY_SCORE: 37
ADLS_ACUITY_SCORE: 33
ADLS_ACUITY_SCORE: 37
ADLS_ACUITY_SCORE: 35
ADLS_ACUITY_SCORE: 37
ADLS_ACUITY_SCORE: 35

## 2024-09-23 NOTE — PROGRESS NOTES
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 -on immunosuppressive therapy with prednisone and sirolimus, CKD with recurrent urinary tract infections with MDRO including ESBL producers, who has been hospitalized due to nausea, vomiting, urinary frequency and weakness, was febrile and hypotensive , and has been admitted with sepsis likely of urinary source     -Septic shock  -Likely UTI  -DERREK superimposed on CKA, hypokalemia  -s/p liver transplantation, on immunosuppressive therapy with prednisone and sirolimus  -Multiple antibiotic allergies -PCN, cephalosporins, Azithromycin, Doxycycline, Quinolones  -Diabetes  -Chronic medical illnesses -atrial fibrillation, lymphedema, HTN, hyperlipidemia, mitral regurgitation, CVA     Recommendations:  ESBL organism is quinolone sensitive, but discussed her allergies in detail, Cipro more of a side effect issue than allergy but she has been unable to take it thus really no option here except for an IV course  Continue Meropenem with the plan to go to ertapenem, also renal adjusted when improved enough  Discussed in detail,  has a history of recurrent UTIs largely ESBL, most of them being treated in [place] has an ID doctor there, it looks like urology here he referred her to ID at the  but now that we have seen her probably us to follow.  Needs a primary physician to be the initial screener for UTIs etc. not the role for us        Interval History:     no new complaints and doing well; no cp, sob, n/v/d, or abd pain.  Feels significantly better, long discussion with the patient about her allergies, prior UTIs, IV antibiotics etc.              Medications:     Current Facility-Administered Medications   Medication Dose Route Frequency Provider Last Rate  Last Admin    apixaban ANTICOAGULANT (ELIQUIS) tablet 2.5 mg  2.5 mg Oral BID German Casey MD   2.5 mg at 09/23/24 0758    calcitRIOL (ROCALTROL) capsule 0.25 mcg  0.25 mcg Oral Daily German Casey MD   0.25 mcg at 09/23/24 0801    estradiol (ESTRACE) cream 2 g  2 g Vaginal Every Other Day Herman Barahona MD   2 g at 09/23/24 1404    evolocumab (REPATHA) injection 140 mg  140 mg Subcutaneous Q14 Days Herman Barahona MD   140 mg at 09/23/24 1404    ezetimibe (ZETIA) tablet 10 mg  10 mg Oral Daily German Casey MD   10 mg at 09/23/24 0801    fish oil-omega-3 fatty acids capsule 1 g  1 g Oral BID Herman Barahona MD        folic acid (FOLVITE) tablet 1 mg  1 mg Oral Daily German Casey MD   1 mg at 09/23/24 0758    furosemide (LASIX) tablet 20 mg  20 mg Oral Daily Herman Barahona MD   20 mg at 09/23/24 1120    gabapentin (NEURONTIN) solution 300 mg  300 mg Oral At Bedtime German Casey MD   300 mg at 09/22/24 2244    insulin aspart (NovoLOG) injection (RAPID ACTING)  1-7 Units Subcutaneous TID AC German Casey MD   2 Units at 09/23/24 1210    insulin aspart (NovoLOG) injection (RAPID ACTING)  1-5 Units Subcutaneous At Bedtime German Casey MD   1 Units at 09/22/24 2240    levothyroxine (SYNTHROID/LEVOTHROID) tablet 175 mcg  175 mcg Oral Daily German Casey MD   175 mcg at 09/23/24 0801    meropenem (MERREM) 1 g vial to attach to  mL bag  1 g Intravenous Q12H German Casey MD   1 g at 09/23/24 0420    metoprolol succinate ER (TOPROL XL) 24 hr tablet 25 mg  25 mg Oral Daily Herman Barahona MD   25 mg at 09/23/24 1120    predniSONE (DELTASONE) tablet 10 mg  10 mg Oral Daily German Casey MD   10 mg at 09/23/24 0758    sirolimus (GENERIC EQUIVALENT) tablet 1 mg  1 mg Oral Daily Herman Barahona MD   1 mg at 09/23/24 0801    sitagliptin (JANUVIA) tablet 50 mg  50 mg Oral Daily Herman Barahona MD   50 mg at 09/23/24 0800    sodium  "chloride (PF) 0.9% PF flush 3 mL  3 mL Intracatheter Q8H German Casey MD   3 mL at 09/23/24 1121    ursodiol (ACTIGALL) tablet 250 mg  250 mg Oral BID German Casey MD   250 mg at 09/23/24 4659                  Physical Exam:   Blood pressure (!) 156/107, pulse 67, temperature 97.6  F (36.4  C), temperature source Temporal, resp. rate 12, height 1.702 m (5' 7\"), weight 80.7 kg (177 lb 14.4 oz), last menstrual period 06/01/1988, SpO2 100%, not currently breastfeeding.  Wt Readings from Last 2 Encounters:   09/22/24 80.7 kg (177 lb 14.4 oz)   09/09/24 78.5 kg (173 lb)     Vital Signs with Ranges  Temp:  [97  F (36.1  C)-98  F (36.7  C)] 97.6  F (36.4  C)  Pulse:  [60-77] 67  Resp:  [0-23] 12  BP: (102-168)/() 156/107  SpO2:  [90 %-100 %] 100 %    Constitutional: Awake, alert, cooperative, no apparent distress     Lungs: Clear to auscultation bilaterally, no crackles or wheezing   Cardiovascular: Regular rate and rhythm, normal S1 and S2, and no murmur noted   Abdomen: Normal bowel sounds, soft, non-distended, non-tender   Skin: No rashes, no cyanosis, no edema   Other:           Data:   All microbiology laboratory data reviewed.  Recent Labs   Lab Test 09/23/24  0610 09/22/24  0144 09/04/24  1008   WBC 17.2* 5.1 10.3   HGB 10.1* 12.9 12.2   HCT 32.5* 41.5 39.0   MCV 98 96 96    383 324     Recent Labs   Lab Test 09/23/24  0610 09/22/24  0710 09/22/24  0144   CR 1.60* 1.69* 1.73*     Recent Labs   Lab Test 08/26/19  2331   SED 78*     Recent Labs   Lab Test 08/30/19  0657 08/29/19  0544 08/28/19  1718 08/28/19  1710 08/27/19  0224 08/27/19  0150 08/26/19  2331 09/27/18  1912 07/30/18  0852   CULT No growth No growth No growth No growth 10,000 to 50,000 colonies/mL  Escherichia coli  * Cultured on the 1st day of incubation:  Escherichia coli  Susceptibility testing done on previous specimen  *  Critical Value/Significant Value, preliminary result only, called to and read back by   Maria Teresa" Conrad LAZCANO 08/27/2019 @1425 dk/hdp   Cultured on the 1st day of incubation:  Escherichia coli  *  Critical Value/Significant Value, preliminary result only, called to and read back by  NENO Robert at [id number]    (Note)  POSITIVE for E.COLI by Verigene multiplex nucleic acid test. Final  identification and antimicrobial susceptibility testing will be  verified by standard methods. Verigene test will not distinguish  E.coli from Shigella species including S.dysenteriae, S.flexneri,  S.boydii, and S.sonnei. Specimens containing Shigella species or  E.coli will be reported as Positive for E.coli.    Specimen tested with Verigene multiplex, gram-negative blood culture  nucleic acid test for the following targets: Acinetobacter sp.,  Citrobacter sp., Enterobacter sp., Proteus sp., E. coli, K.  pneumoniae/oxytoca, P. aeruginosa, and the following resistance  markers: CTXM, KPC, NDM, VIM, IMP and OXA.    Critical Value/Significant Value called to and read back by  Flower Lujan Rn @ 4407 8.27.19      Moderate growth  Staphylococcus aureus  * 50,000 to 100,000 colonies/mL  mixed urogenital louann  Susceptibility testing not routinely done

## 2024-09-23 NOTE — PROGRESS NOTES
"   09/23/24 1112   Appointment Info   Signing Clinician's Name / Credentials (PT) SOSA Hayes   Student Supervision On-site supervision provided;Direct supervision provided;Line of sight supervision provided;Direct Patient Contact Provided;Therapy services provided with the co-signing licensed therapist guiding and directing the services, and providing the skilled judgement and assessment throughout the session  (Trisha Santizo, DPT)   Living Environment   People in Home alone   Current Living Arrangements apartment   Living Environment Comments Pt lives IND in apartment with elevator access, notes that she has a long corridor walk to cafeteria   Self-Care   Usual Activity Tolerance moderate   Current Activity Tolerance fair   Regular Exercise No   Equipment Currently Used at Home walker, rolling;cane, straight   Fall history within last six months yes   Number of times patient has fallen within last six months 1   Activity/Exercise/Self-Care Comment Pt reports walking to cafeteria once per day, is IND with showering(has had OT in past with equipment to ease showering), does cook breakfast IND as well.   General Information   Onset of Illness/Injury or Date of Surgery 09/22/24   Referring Physician Herman Barahona MD   Patient/Family Therapy Goals Statement (PT) Return Home   Pertinent History of Current Problem (include personal factors and/or comorbidities that impact the POC) Per Chart review: \"Luz Thompson is a 74 year old female with a history of hyperlipidemia, hypertension, CKD stage 3, liver transplant, and DVT anticoagulated on Eliquis who presents via with complaints of nausea and generalized weakness. Nurse with information from EMS reports that patient is from [facility]. She stated to EMS she believes she may have a UTI, and that she has increased urinary frequency, nausea, vomiting, and weakness. She received 4 mg Zofran en route.  Patient unable to provide much more history\" "   Cognition   Affect/Mental Status (Cognition) WNL   Orientation Status (Cognition) oriented x 4   Follows Commands (Cognition) WNL   Pain Assessment   Patient Currently in Pain No   Integumentary/Edema   Integumentary/Edema Comments Not assessed   Posture    Posture Forward head position;Protracted shoulders   Range of Motion (ROM)   ROM Comment R Knee Pain limits motion   Strength (Manual Muscle Testing)   Strength (Manual Muscle Testing) Deficits observed during functional mobility  (R Knee Pain limiter)   Bed Mobility   Comment, (Bed Mobility) Pt SBA with Supine to Sit, Sit to Supine  (Pt has increased difficulty with sit to supine, requiring multiple attempts to clear legs)   Transfers   Comment, (Transfers) Pt able to perform STS with CGA   Gait/Stairs (Locomotion)   Comment, (Gait/Stairs) Pt able to ambulate 10' with CGA and SPC   Balance   Balance Comments Requires SPC for safe ambulation   Sensory Examination   Sensory Perception Comments Not assessed   Coordination   Coordination Comments Not assessed   Muscle Tone   Muscle Tone Comments Not assessed   Clinical Impression   Criteria for Skilled Therapeutic Intervention Yes, treatment indicated   PT Diagnosis (PT) Impaired Mobility   Influenced by the following impairments Weakness, impaired balance, impaired activity tolerance   Functional limitations due to impairments Ambulation, Transfers   Clinical Presentation (PT Evaluation Complexity) stable   Clinical Presentation Rationale Clinical Judgement   Clinical Decision Making (Complexity) low complexity   Planned Therapy Interventions (PT) home exercise program;patient/family education;postural re-education;ROM (range of motion);strengthening;home program guidelines;progressive activity/exercise   Risk & Benefits of therapy have been explained evaluation/treatment results reviewed;care plan/treatment goals reviewed;risks/benefits reviewed;current/potential barriers reviewed;participants voiced agreement  with care plan;participants included;patient   PT Total Evaluation Time   PT Eval, Low Complexity Minutes (15165) 8   Physical Therapy Goals   PT Frequency Daily   PT Predicted Duration/Target Date for Goal Attainment 09/25/24   PT Goals Transfers;Gait;Bed Mobility   PT: Bed Mobility Modified independent;Supine to/from sit   PT: Transfers Modified independent   PT: Gait Modified independent;Straight cane;Greater than 200 feet   Interventions   Interventions Quick Adds Therapeutic Activity   Therapeutic Activity   Therapeutic Activities: dynamic activities to improve functional performance Minutes (00217) 15   Symptoms Noted During/After Treatment Fatigue   Treatment Detail/Skilled Intervention Pt greeted in supine and agreeable to session. Pt cued to perform STS with CGA. Pt cued to ambulate 10'x3 with CGA and SPC. Pt demoed a slow reciprocal gait pattern with decreased stride lengths bilaterally. Pt notes pain in her R Knee throughout ambulation. Pt cued to return to bed, needed 3 attempts, able to perform with SBA. Pt educated on benefits of mobility and discharge reccomendations. Discussed benefits of home PT wit pt.   PT Discharge Planning   PT Plan Progress Ambulation, Review Bed Mobility, Assess for OT needs   PT Discharge Recommendation (DC Rec) home with assist;home with home care physical therapy   PT Rationale for DC Rec Pt is currently below baseline for functional mobility. Pt is currently SBA for all mobility, however noted endurance deficits during ambulation, as well as LE strength deficits with bed mobility. Anticipate Pt will meet all PT goals. Rec HHPT to progress strength, activity tolerance to improve Pt safety, IND at home.   PT Brief overview of current status Ax1   Total Session Time   Timed Code Treatment Minutes 15   Total Session Time (sum of timed and untimed services) 23

## 2024-09-23 NOTE — PLAN OF CARE
"ICU End of Shift Summary.  For vital signs and complete assessments, please see documentation flowsheets.      Pertinent assessments: -170s  Major Shift Events: n/a  Plan (Upcoming Events): restart home BP meds, continue IV ABX course   Discharge/Transfer Needs: TBD     Bedside Shift Report Completed : yes  Bedside Safety Check Completed: yes    Problem: Adult Inpatient Plan of Care  Goal: Plan of Care Review  Description: The Plan of Care Review/Shift note should be completed every shift.  The Outcome Evaluation is a brief statement about your assessment that the patient is improving, declining, or no change.  This information will be displayed automatically on your shift  note.  Outcome: Progressing  Flowsheets (Taken 9/23/2024 1707)  Outcome Evaluation: HTN, PRN Hydralazine given. AOx4, other VSS on RA. +void. Continue Meropennem.  Plan of Care Reviewed With:   patient   grandchild(zaid)  Overall Patient Progress: improving  Goal: Patient-Specific Goal (Individualized)  Description: You can add care plan individualizations to a care plan. Examples of Individualization might be:  \"Parent requests to be called daily at 9am for status\", \"I have a hard time hearing out of my right ear\", or \"Do not touch me to wake me up as it startles  me\".  Outcome: Progressing  Goal: Absence of Hospital-Acquired Illness or Injury  Outcome: Progressing  Intervention: Identify and Manage Fall Risk  Recent Flowsheet Documentation  Taken 9/23/2024 1600 by Clementine Olivo, RN  Safety Promotion/Fall Prevention:   activity supervised   assistive device/personal items within reach   clutter free environment maintained   increased rounding and observation   increase visualization of patient   lighting adjusted   room door open   room near nurse's station   safety round/check completed  Taken 9/23/2024 1200 by Clementine Olivo, RN  Safety Promotion/Fall Prevention:   activity supervised   assistive device/personal items within " reach   clutter free environment maintained   increased rounding and observation   increase visualization of patient   lighting adjusted   room door open   room near nurse's station   safety round/check completed  Taken 9/23/2024 0800 by Clementine Olivo RN  Safety Promotion/Fall Prevention:   activity supervised   assistive device/personal items within reach   clutter free environment maintained   increased rounding and observation   increase visualization of patient   lighting adjusted   room door open   room near nurse's station   safety round/check completed  Intervention: Prevent Skin Injury  Recent Flowsheet Documentation  Taken 9/23/2024 1600 by Clementine Olivo RN  Body Position: position changed independently  Taken 9/23/2024 1200 by Clementine Olivo RN  Body Position: position changed independently  Taken 9/23/2024 1000 by Clementine Olivo RN  Body Position: position changed independently  Taken 9/23/2024 0800 by Clementine Olivo RN  Body Position: position changed independently  Intervention: Prevent and Manage VTE (Venous Thromboembolism) Risk  Recent Flowsheet Documentation  Taken 9/23/2024 1600 by Clementine Olivo RN  VTE Prevention/Management: SCDs on (sequential compression devices)  Taken 9/23/2024 1200 by Clementine Olivo RN  VTE Prevention/Management: SCDs on (sequential compression devices)  Taken 9/23/2024 0800 by Clementine Olivo RN  VTE Prevention/Management: SCDs on (sequential compression devices)  Intervention: Prevent Infection  Recent Flowsheet Documentation  Taken 9/23/2024 1600 by Clementine Olivo RN  Infection Prevention:   hand hygiene promoted   equipment surfaces disinfected   environmental surveillance performed   single patient room provided  Taken 9/23/2024 1200 by Clementine Olivo RN  Infection Prevention:   hand hygiene promoted   equipment surfaces disinfected   environmental surveillance performed   single patient room provided  Taken  9/23/2024 0800 by Clementine Olivo RN  Infection Prevention:   hand hygiene promoted   equipment surfaces disinfected   environmental surveillance performed   single patient room provided  Goal: Optimal Comfort and Wellbeing  Outcome: Progressing  Intervention: Provide Person-Centered Care  Recent Flowsheet Documentation  Taken 9/23/2024 1600 by Clementine Olivo RN  Trust Relationship/Rapport:   care explained   choices provided   questions answered   questions encouraged   reassurance provided   thoughts/feelings acknowledged  Taken 9/23/2024 1200 by Clementine Olivo RN  Trust Relationship/Rapport:   care explained   choices provided   questions answered   questions encouraged   reassurance provided   thoughts/feelings acknowledged  Taken 9/23/2024 0800 by Clementine Olivo RN  Trust Relationship/Rapport:   care explained   choices provided   questions answered   questions encouraged   reassurance provided   thoughts/feelings acknowledged  Goal: Readiness for Transition of Care  Outcome: Progressing     Problem: Sepsis/Septic Shock  Goal: Optimal Coping  Outcome: Progressing  Intervention: Optimize Psychosocial Adjustment to Illness  Recent Flowsheet Documentation  Taken 9/23/2024 1600 by Clementine Olivo RN  Supportive Measures:   active listening utilized   positive reinforcement provided  Taken 9/23/2024 1200 by Clementine Olivo RN  Supportive Measures:   active listening utilized   positive reinforcement provided  Family/Support System Care:   involvement promoted   presence promoted   self-care encouraged   support provided  Taken 9/23/2024 0800 by Clementine Olivo RN  Supportive Measures:   active listening utilized   positive reinforcement provided  Goal: Absence of Bleeding  Outcome: Progressing  Intervention: Monitor and Manage Bleeding  Recent Flowsheet Documentation  Taken 9/23/2024 1600 by Clementine Olivo RN  Bleeding Precautions: blood pressure closely monitored  Taken  9/23/2024 1200 by Clementine Olivo RN  Bleeding Precautions: blood pressure closely monitored  Taken 9/23/2024 0800 by Clementine Olivo RN  Bleeding Precautions: blood pressure closely monitored  Goal: Blood Glucose Level Within Targeted Range  Outcome: Progressing  Intervention: Optimize Glycemic Control  Recent Flowsheet Documentation  Taken 9/23/2024 1600 by Clementine Olivo RN  Glycemic Management: blood glucose monitored  Taken 9/23/2024 1200 by Clementine Olivo RN  Glycemic Management: blood glucose monitored  Taken 9/23/2024 0800 by Clementine Olivo RN  Glycemic Management: blood glucose monitored  Goal: Absence of Infection Signs and Symptoms  Outcome: Progressing  Intervention: Initiate Sepsis Management  Recent Flowsheet Documentation  Taken 9/23/2024 1600 by Clementine Olivo RN  Infection Prevention:   hand hygiene promoted   equipment surfaces disinfected   environmental surveillance performed   single patient room provided  Isolation Precautions: contact precautions maintained  Taken 9/23/2024 1200 by Clementine Olivo RN  Infection Prevention:   hand hygiene promoted   equipment surfaces disinfected   environmental surveillance performed   single patient room provided  Isolation Precautions: contact precautions maintained  Taken 9/23/2024 0800 by Clementine Olivo RN  Infection Prevention:   hand hygiene promoted   equipment surfaces disinfected   environmental surveillance performed   single patient room provided  Isolation Precautions: contact precautions maintained  Intervention: Promote Stabilization  Recent Flowsheet Documentation  Taken 9/23/2024 1600 by Clementine Olivo RN  Lung Protection Measures: fluid excess minimized  Fluid/Electrolyte Management: fluids provided  Taken 9/23/2024 1200 by Clementine Olivo RN  Lung Protection Measures: fluid excess minimized  Fluid/Electrolyte Management: fluids provided  Taken 9/23/2024 0800 by Clementine Olivo  RN  Lung Protection Measures: fluid excess minimized  Fluid/Electrolyte Management: fluids provided  Intervention: Promote Recovery  Recent Flowsheet Documentation  Taken 9/23/2024 1600 by Clementine Olivo RN  Airway/Ventilation Support: pulmonary hygiene promoted  Activity Management: bedrest  Taken 9/23/2024 1200 by Clementine Olivo RN  Airway/Ventilation Support: pulmonary hygiene promoted  Activity Management: bedrest  Taken 9/23/2024 0800 by Clementine Olivo RN  Airway/Ventilation Support: pulmonary hygiene promoted  Activity Management: bedrest  Goal: Optimal Nutrition Intake  Outcome: Progressing     Problem: Comorbidity Management  Goal: Blood Glucose Levels Within Targeted Range  Outcome: Progressing  Intervention: Monitor and Manage Glycemia  Recent Flowsheet Documentation  Taken 9/23/2024 1600 by Clementine Olivo RN  Glycemic Management: blood glucose monitored  Medication Review/Management: medications reviewed  Taken 9/23/2024 1200 by Clementine Olivo RN  Glycemic Management: blood glucose monitored  Medication Review/Management: medications reviewed  Taken 9/23/2024 0800 by Clementine Olivo RN  Glycemic Management: blood glucose monitored  Medication Review/Management: medications reviewed     Problem: Skin Injury Risk Increased  Goal: Skin Health and Integrity  Outcome: Progressing  Intervention: Plan: Nurse Driven Intervention: Moisture Management  Recent Flowsheet Documentation  Taken 9/23/2024 0800 by Clementine Olivo RN  Bathing/Skin Care: wipes, CHG  Intervention: Plan: Nurse Driven Intervention: Friction and Shear  Recent Flowsheet Documentation  Taken 9/23/2024 1600 by Clementine Olivo RN  Friction/Shear Interventions:   HOB 30 degrees or less   Assistive lifting device (portable/ceiling lift, etc.)   Repositioning device (TAP system, etc.)  Taken 9/23/2024 1200 by Clementine Olivo RN  Friction/Shear Interventions:   HOB 30 degrees or less   Assistive lifting  device (portable/ceiling lift, etc.)   Repositioning device (TAP system, etc.)  Taken 9/23/2024 0800 by Clementine Olivo RN  Friction/Shear Interventions:   HOB 30 degrees or less   Assistive lifting device (portable/ceiling lift, etc.)   Repositioning device (TAP system, etc.)  Intervention: Optimize Skin Protection  Recent Flowsheet Documentation  Taken 9/23/2024 1600 by Clementine Olivo RN  Activity Management: bedrest  Head of Bed (HOB) Positioning: HOB at 45 degrees  Taken 9/23/2024 1200 by Clementine Olivo RN  Activity Management: bedrest  Head of Bed (HOB) Positioning: HOB at 45 degrees  Taken 9/23/2024 0800 by Clementine Olivo RN  Activity Management: bedrest  Head of Bed (HOB) Positioning: HOB at 45 degrees   Goal Outcome Evaluation:      Plan of Care Reviewed With: patient, grandchild(zaid)    Overall Patient Progress: improvingOverall Patient Progress: improving    Outcome Evaluation: HTN, PRN Hydralazine given. AOx4, other VSS on RA. +void. Continue Meropennem.

## 2024-09-23 NOTE — PROGRESS NOTES
"[facility]    Medicine Progress Note - Hospitalist Service    Date of Admission:  9/22/2024    Assessment & Plan   Luz Thompson is a 74 year old female who presented to the ED on 9/22/2024 with concern for feeling weak and nauseated. She was concerned about a UTI.  Of major concern, she rather abruptly became hypotensive in the ED for which reason she had a central line placed and was admitted to the ICU.      PMH is notable for PBC s/p liver transplant in 2002 on chronic immunosuppression, ESBL/VRE infections, frequent UTIs, paroxysmal A-fib on Eliquis with loop recorder in place, DVT, DM 2, lymphedema, HTN, HLD, CVA, hypothyroidism, Sjogren syndrome, anxiety, glaucoma, anemia, chronic back pain, CKD stage IIIb, and osteoporosis.     \" Per Blood pressure 179/106 with repeat 159/82, tachycardic at 118, temperature 100.5  F, oxygen 99% on room air.  Initial lactic acid elevated at 4.0.  WBC 5.1, hemoglobin 12.9, platelet count 383.  Creatinine is 1.73 otherwise BMP unremarkable.  Glucose is 182.  LFTs within normal limits.  Procalcitonin 0.13.  UA shows 116 WBCs and moderate LE.  COVID-19, influenza A/B, RSV PCR is negative.  Chest x-ray does not show any infiltrate.  EKG shows sinus tachycardia.  She received IV meropenem, gabapentin, acetaminophen 1 g, and 2 L NS bolus.  Repeat lactic acid is still elevated at 2.9 so she is receiving additional 500 mL of NS now.  Admit inpatient.  Addendum: Became hypotensive to 83/40 lactic acid still elevated 2.9.  Received additional 1 L LR, but remains hypotensive the low 90s systolic.  Central line placed.  Starting on norepinephrine and admitting to ICU.\"    Blood cx grew GNR which is identified by myTips as Klebsiella oxytoca with ESBL.      Diagnoses:  Septic shock thought due to UTI. Required peripheral norepi briefly but is now stable  without pressors.   Immunosuppressed on Sirolimus and prednisone for Liver transplant (2007).   Hs ESBL " "Klebsiella.  Pt also has multiple antibiotic allergies, though no anaphylaxis recorded.   CKD stage 3B-4.  NIDDM.  Paroxysmal A fib, poss atrial tachycardia with SSS.  More often with issues with tachycardia.  HAs an implanted monitor ([employer]tronic Linq 2 device). Managed with rate-control strategy.   Hypothyroidism.   PLAN:  Cont with meropenem.   ID has been consulted.   Has already weaned off of Norepi.  Although the pt is only on Prednisone 10 mg daily at baseline, this has been lifelong, I will start empiric stress-dose steroids: hydrocortisone 50 mg IV q 8h x 1 day, then resume Prednisone 10 mg if she's still stable tomorrow.   Insulin sliding scale.   Usual home meds reviewed.           Diet: Combination Diet Regular Diet Adult    DVT Prophylaxis: DOAC  Ron Catheter: Not present  Lines: PRESENT      CVC Triple Lumen Left Internal jugular-Site Assessment: WDL      Cardiac Monitoring: ACTIVE order. Indication: ICU  Code Status: Full Code      Clinically Significant Risk Factors        # Hypokalemia: Lowest K = 3.2 mmol/L in last 2 days, will replace as needed   # Hypocalcemia: Lowest Ca = 8.2 mg/dL in last 2 days, will monitor and replace as appropriate    # Anion Gap Metabolic Acidosis: Highest Anion Gap = 20 mmol/L in last 2 days, will monitor and treat as appropriate        # Hypertension: Noted on problem list          # DMII: A1C = 6.9 % (Ref range: <=5.7 %) within past 6 months, PRESENT ON ADMISSION  # Overweight: Estimated body mass index is 27.86 kg/m  as calculated from the following:    Height as of this encounter: 1.702 m (5' 7\").    Weight as of this encounter: 80.7 kg (177 lb 14.4 oz)., PRESENT ON ADMISSION            Disposition Plan     Medically Ready for Discharge: Anticipated in 2-4 Days        Herman Barahona MD  Hospitalist Service  [place]  Securely message with [employer] (more info)  Text page via [facility] Paging/Directory "   ______________________________________________________________________    Interval History   Noting that blood cx positive yesterday, will remain on meropenem.     Physical Exam   Vital Signs: Temp: 97.3  F (36.3  C) Temp src: Temporal BP: (!) 147/73 Pulse: 66   Resp: 11 SpO2: 93 % O2 Device: None (Room air) Oxygen Delivery: 2 LPM  Weight: 177 lbs 14.4 oz    General Appearance: Alert, coherent in NAD. Word choice and thought processes intact.   Respiratory: no increased WOB  Cardiovascular: RRR without murmur  GI: soft, NTND.    Skin: no lesions. Trace edema bilat LEs. No erythema  Other:      Medical Decision Making       Non-billable MINUTES SPENT BY ME on the date of service doing chart review, history, exam, documentation & further activities per the note.      Data   Results for orders placed or performed during the hospital encounter of 09/22/24 (from the past 24 hour(s))   Glucose by meter   Result Value Ref Range    GLUCOSE BY METER POCT 231 (H) 70 - 99 mg/dL   Glucose by meter   Result Value Ref Range    GLUCOSE BY METER POCT 267 (H) 70 - 99 mg/dL   Glucose by meter   Result Value Ref Range    GLUCOSE BY METER POCT 206 (H) 70 - 99 mg/dL   Glucose by meter   Result Value Ref Range    GLUCOSE BY METER POCT 291 (H) 70 - 99 mg/dL   Glucose by meter   Result Value Ref Range    GLUCOSE BY METER POCT 241 (H) 70 - 99 mg/dL   Potassium Lab   Result Value Ref Range    Potassium 4.3 3.4 - 5.3 mmol/L   Glucose by meter   Result Value Ref Range    GLUCOSE BY METER POCT 236 (H) 70 - 99 mg/dL   Glucose by meter   Result Value Ref Range    GLUCOSE BY METER POCT 264 (H) 70 - 99 mg/dL   Potassium   Result Value Ref Range    Potassium 4.9 3.4 - 5.3 mmol/L   CBC with platelets   Result Value Ref Range    WBC Count 17.2 (H) 4.0 - 11.0 10e3/uL    RBC Count 3.33 (L) 3.80 - 5.20 10e6/uL    Hemoglobin 10.1 (L) 11.7 - 15.7 g/dL    Hematocrit 32.5 (L) 35.0 - 47.0 %    MCV 98 78 - 100 fL    MCH 30.3 26.5 - 33.0 pg    MCHC 31.1 (L)  31.5 - 36.5 g/dL    RDW 15.5 (H) 10.0 - 15.0 %    Platelet Count 284 150 - 450 10e3/uL   Basic metabolic panel   Result Value Ref Range    Sodium 142 135 - 145 mmol/L    Potassium 4.9 3.4 - 5.3 mmol/L    Chloride 107 98 - 107 mmol/L    Carbon Dioxide (CO2) 20 (L) 22 - 29 mmol/L    Anion Gap 15 7 - 15 mmol/L    Urea Nitrogen 32.5 (H) 8.0 - 23.0 mg/dL    Creatinine 1.60 (H) 0.51 - 0.95 mg/dL    GFR Estimate 33 (L) >60 mL/min/1.73m2    Calcium 8.2 (L) 8.8 - 10.4 mg/dL    Glucose 237 (H) 70 - 99 mg/dL     *Note: Due to a large number of results and/or encounters for the requested time period, some results have not been displayed. A complete set of results can be found in Results Review.

## 2024-09-23 NOTE — PLAN OF CARE
"ICU End of Shift Summary.  For vital signs and complete assessments, please see documentation flowsheets.      Pertinent assessments:   Neuro: A&Ox4, DIAZ, follows commands. Able to make needs known.   Cardiac: MAPs>65. SBPs mainly 110s-130s. HR in 60s; lowest noted HR was 57 (pt was sleeping at this time). Tele: NSR.   Resp: on RA. LS clear/dim.   GI: regular diet. No BM this shift. BS+.   : purewick in place, adequate UOP.   Skin: See flowsheets  Lines: PIVx1, L Intrajugular  Drips: IVF     Major Shift Events:      No acute events overnight.     Afebrile. MAPs>65 (has been off of levo since 9/22 @ 0945).     Plan (Upcoming Events): continue plan of care  Discharge/Transfer Needs: TBD     Bedside Shift Report Completed : Yes   Bedside Safety Check Completed: Yes        Problem: Adult Inpatient Plan of Care  Goal: Plan of Care Review  Description: The Plan of Care Review/Shift note should be completed every shift.  The Outcome Evaluation is a brief statement about your assessment that the patient is improving, declining, or no change.  This information will be displayed automatically on your shift  note.  Outcome: Progressing  Flowsheets (Taken 9/23/2024 0632)  Outcome Evaluation: able to keep MAPs>65 without levo  Plan of Care Reviewed With: patient  Overall Patient Progress: improving  Goal: Patient-Specific Goal (Individualized)  Description: You can add care plan individualizations to a care plan. Examples of Individualization might be:  \"Parent requests to be called daily at 9am for status\", \"I have a hard time hearing out of my right ear\", or \"Do not touch me to wake me up as it startles  me\".  Outcome: Progressing  Goal: Absence of Hospital-Acquired Illness or Injury  Outcome: Progressing  Intervention: Identify and Manage Fall Risk  Recent Flowsheet Documentation  Taken 9/23/2024 0400 by Dora Gorman, NENO  Safety Promotion/Fall Prevention:   activity supervised   assistive device/personal items within " reach   clutter free environment maintained   increased rounding and observation   increase visualization of patient   lighting adjusted   room door open   room near nurse's station   safety round/check completed  Taken 9/23/2024 0000 by Dora Gorman RN  Safety Promotion/Fall Prevention:   activity supervised   assistive device/personal items within reach   clutter free environment maintained   increased rounding and observation   increase visualization of patient   lighting adjusted   room door open   room near nurse's station   safety round/check completed  Taken 9/22/2024 2000 by Dora Gorman RN  Safety Promotion/Fall Prevention:   activity supervised   assistive device/personal items within reach   clutter free environment maintained   increased rounding and observation   increase visualization of patient   lighting adjusted   room door open   room near nurse's station   safety round/check completed  Intervention: Prevent Skin Injury  Recent Flowsheet Documentation  Taken 9/23/2024 0400 by Dora Gorman RN  Body Position: position changed independently  Skin Protection:   pulse oximeter probe site changed   incontinence pads utilized   adhesive use limited  Device Skin Pressure Protection:   tubing/devices free from skin contact   positioning supports utilized   adhesive use limited   absorbent pad utilized/changed  Taken 9/23/2024 0200 by Dora Gorman RN  Body Position: position changed independently  Taken 9/23/2024 0000 by Dora Gorman RN  Body Position: position changed independently  Skin Protection:   pulse oximeter probe site changed   incontinence pads utilized   adhesive use limited  Device Skin Pressure Protection:   tubing/devices free from skin contact   positioning supports utilized   adhesive use limited   absorbent pad utilized/changed  Taken 9/22/2024 2000 by Dora Gorman RN  Body Position: position changed independently  Skin Protection:   pulse oximeter probe site changed    incontinence pads utilized   adhesive use limited  Device Skin Pressure Protection:   tubing/devices free from skin contact   positioning supports utilized   adhesive use limited   absorbent pad utilized/changed  Intervention: Prevent and Manage VTE (Venous Thromboembolism) Risk  Recent Flowsheet Documentation  Taken 9/23/2024 0400 by Dora Gorman RN  VTE Prevention/Management: SCDs on (sequential compression devices)  Taken 9/23/2024 0000 by Dora Gorman RN  VTE Prevention/Management: SCDs on (sequential compression devices)  Taken 9/22/2024 2000 by Dora Gorman RN  VTE Prevention/Management: SCDs on (sequential compression devices)  Intervention: Prevent Infection  Recent Flowsheet Documentation  Taken 9/23/2024 0400 by Dora Gorman RN  Infection Prevention:   hand hygiene promoted   equipment surfaces disinfected   environmental surveillance performed   single patient room provided  Taken 9/23/2024 0000 by Dora Gorman RN  Infection Prevention:   hand hygiene promoted   equipment surfaces disinfected   environmental surveillance performed   single patient room provided  Taken 9/22/2024 2000 by Dora Gorman RN  Infection Prevention:   hand hygiene promoted   equipment surfaces disinfected   environmental surveillance performed   single patient room provided  Goal: Optimal Comfort and Wellbeing  Outcome: Progressing  Intervention: Provide Person-Centered Care  Recent Flowsheet Documentation  Taken 9/23/2024 0400 by Dora Gorman RN  Trust Relationship/Rapport:   care explained   choices provided   questions answered   questions encouraged   reassurance provided   thoughts/feelings acknowledged  Taken 9/23/2024 0000 by Dora Gorman RN  Trust Relationship/Rapport:   care explained   choices provided   questions answered   questions encouraged   reassurance provided   thoughts/feelings acknowledged  Taken 9/22/2024 2000 by Dora Gorman RN  Trust Relationship/Rapport:   care explained   choices  provided   questions answered   questions encouraged   reassurance provided   thoughts/feelings acknowledged  Goal: Readiness for Transition of Care  Outcome: Progressing     Problem: Sepsis/Septic Shock  Goal: Optimal Coping  Outcome: Progressing  Intervention: Optimize Psychosocial Adjustment to Illness  Recent Flowsheet Documentation  Taken 9/23/2024 0400 by Dora Gorman RN  Supportive Measures:   active listening utilized   positive reinforcement provided  Taken 9/23/2024 0000 by Dora Gorman RN  Supportive Measures:   active listening utilized   positive reinforcement provided  Taken 9/22/2024 2000 by Dora Gorman RN  Supportive Measures:   active listening utilized   positive reinforcement provided  Goal: Absence of Bleeding  Outcome: Progressing  Intervention: Monitor and Manage Bleeding  Recent Flowsheet Documentation  Taken 9/23/2024 0400 by Dora Gorman RN  Bleeding Precautions: blood pressure closely monitored  Taken 9/23/2024 0000 by Dora Gorman RN  Bleeding Precautions: blood pressure closely monitored  Taken 9/22/2024 2000 by Dora Gorman RN  Bleeding Precautions: blood pressure closely monitored  Goal: Blood Glucose Level Within Targeted Range  Outcome: Progressing  Intervention: Optimize Glycemic Control  Recent Flowsheet Documentation  Taken 9/23/2024 0400 by Dora Gorman RN  Glycemic Management: blood glucose monitored  Taken 9/23/2024 0000 by Dora Gorman RN  Glycemic Management: blood glucose monitored  Taken 9/22/2024 2000 by Dora Gorman RN  Glycemic Management: blood glucose monitored  Goal: Absence of Infection Signs and Symptoms  Outcome: Progressing  Intervention: Initiate Sepsis Management  Recent Flowsheet Documentation  Taken 9/23/2024 0400 by Dora Gorman RN  Infection Prevention:   hand hygiene promoted   equipment surfaces disinfected   environmental surveillance performed   single patient room provided  Isolation Precautions: contact precautions maintained  Taken  9/23/2024 0000 by Dora Gorman RN  Infection Prevention:   hand hygiene promoted   equipment surfaces disinfected   environmental surveillance performed   single patient room provided  Isolation Precautions: contact precautions maintained  Taken 9/22/2024 2000 by Dora Gorman RN  Infection Prevention:   hand hygiene promoted   equipment surfaces disinfected   environmental surveillance performed   single patient room provided  Isolation Precautions: contact precautions maintained  Intervention: Promote Stabilization  Recent Flowsheet Documentation  Taken 9/23/2024 0400 by Dora Gorman RN  Fluid/Electrolyte Management: fluids provided  Taken 9/23/2024 0000 by Dora Gorman RN  Fluid/Electrolyte Management: fluids provided  Taken 9/22/2024 2000 by Dora Gorman RN  Fluid/Electrolyte Management: fluids provided  Intervention: Promote Recovery  Recent Flowsheet Documentation  Taken 9/23/2024 0400 by Dora Gorman RN  Airway/Ventilation Support: pulmonary hygiene promoted  Sleep/Rest Enhancement:   awakenings minimized   room darkened  Activity Management: bedrest  Taken 9/23/2024 0000 by Dora Gorman RN  Airway/Ventilation Support: pulmonary hygiene promoted  Sleep/Rest Enhancement:   awakenings minimized   room darkened  Activity Management: bedrest  Taken 9/22/2024 2000 by Dora Gorman RN  Airway/Ventilation Support: pulmonary hygiene promoted  Sleep/Rest Enhancement:   awakenings minimized   room darkened  Activity Management: bedrest  Goal: Optimal Nutrition Intake  Outcome: Progressing     Problem: Comorbidity Management  Goal: Blood Glucose Levels Within Targeted Range  Outcome: Progressing  Intervention: Monitor and Manage Glycemia  Recent Flowsheet Documentation  Taken 9/23/2024 0400 by Dora Gorman RN  Glycemic Management: blood glucose monitored  Medication Review/Management: medications reviewed  Taken 9/23/2024 0000 by Dora Gorman RN  Glycemic Management: blood glucose monitored  Medication  Review/Management: medications reviewed  Taken 9/22/2024 2000 by Dora Gorman RN  Glycemic Management: blood glucose monitored  Medication Review/Management: medications reviewed     Problem: Skin Injury Risk Increased  Goal: Skin Health and Integrity  Outcome: Progressing  Intervention: Plan: Nurse Driven Intervention: Friction and Shear  Recent Flowsheet Documentation  Taken 9/23/2024 0400 by Dora Gorman RN  Friction/Shear Interventions:   HOB 30 degrees or less   Assistive lifting device (portable/ceiling lift, etc.)   Repositioning device (TAP system, etc.)  Taken 9/23/2024 0000 by Dora Gorman RN  Friction/Shear Interventions:   HOB 30 degrees or less   Assistive lifting device (portable/ceiling lift, etc.)   Repositioning device (TAP system, etc.)  Taken 9/22/2024 2000 by Dora Gorman RN  Friction/Shear Interventions:   HOB 30 degrees or less   Assistive lifting device (portable/ceiling lift, etc.)   Repositioning device (TAP system, etc.)  Intervention: Optimize Skin Protection  Recent Flowsheet Documentation  Taken 9/23/2024 0400 by Dora Gorman RN  Skin Protection:   pulse oximeter probe site changed   incontinence pads utilized   adhesive use limited  Activity Management: bedrest  Head of Bed (HOB) Positioning: HOB at 20-30 degrees  Taken 9/23/2024 0200 by Dora Gorman RN  Head of Bed (HOB) Positioning: HOB at 20-30 degrees  Taken 9/23/2024 0000 by Dora Gorman RN  Skin Protection:   pulse oximeter probe site changed   incontinence pads utilized   adhesive use limited  Activity Management: bedrest  Head of Bed (HOB) Positioning: HOB at 20-30 degrees  Taken 9/22/2024 2000 by Dora Gorman RN  Skin Protection:   pulse oximeter probe site changed   incontinence pads utilized   adhesive use limited  Activity Management: bedrest  Head of Bed (HOB) Positioning: HOB at 20-30 degrees           Goal Outcome Evaluation:      Plan of Care Reviewed With: patient    Overall Patient Progress:  improvingOverall Patient Progress: improving    Outcome Evaluation: able to keep MAPs>65 without levo

## 2024-09-24 ENCOUNTER — HOME INFUSION (PRE-WILLOW HOME INFUSION) (OUTPATIENT)
Dept: PHARMACY | Facility: CLINIC | Age: 74
End: 2024-09-24

## 2024-09-24 LAB
ANION GAP SERPL CALCULATED.3IONS-SCNC: 13 MMOL/L (ref 7–15)
BUN SERPL-MCNC: 37.9 MG/DL (ref 8–23)
CALCIUM SERPL-MCNC: 8.6 MG/DL (ref 8.8–10.4)
CHLORIDE SERPL-SCNC: 103 MMOL/L (ref 98–107)
CREAT SERPL-MCNC: 1.46 MG/DL (ref 0.51–0.95)
EGFRCR SERPLBLD CKD-EPI 2021: 37 ML/MIN/1.73M2
ERYTHROCYTE [DISTWIDTH] IN BLOOD BY AUTOMATED COUNT: 15.2 % (ref 10–15)
GLUCOSE BLDC GLUCOMTR-MCNC: 152 MG/DL (ref 70–99)
GLUCOSE BLDC GLUCOMTR-MCNC: 162 MG/DL (ref 70–99)
GLUCOSE BLDC GLUCOMTR-MCNC: 170 MG/DL (ref 70–99)
GLUCOSE BLDC GLUCOMTR-MCNC: 223 MG/DL (ref 70–99)
GLUCOSE SERPL-MCNC: 144 MG/DL (ref 70–99)
HCO3 SERPL-SCNC: 24 MMOL/L (ref 22–29)
HCT VFR BLD AUTO: 36.4 % (ref 35–47)
HGB BLD-MCNC: 11 G/DL (ref 11.7–15.7)
MCH RBC QN AUTO: 29.3 PG (ref 26.5–33)
MCHC RBC AUTO-ENTMCNC: 30.2 G/DL (ref 31.5–36.5)
MCV RBC AUTO: 97 FL (ref 78–100)
PLATELET # BLD AUTO: 313 10E3/UL (ref 150–450)
POTASSIUM SERPL-SCNC: 3.5 MMOL/L (ref 3.4–5.3)
RBC # BLD AUTO: 3.76 10E6/UL (ref 3.8–5.2)
SODIUM SERPL-SCNC: 140 MMOL/L (ref 135–145)
WBC # BLD AUTO: 15.6 10E3/UL (ref 4–11)

## 2024-09-24 PROCEDURE — 99232 SBSQ HOSP IP/OBS MODERATE 35: CPT | Performed by: INTERNAL MEDICINE

## 2024-09-24 PROCEDURE — 250N000011 HC RX IP 250 OP 636: Performed by: INTERNAL MEDICINE

## 2024-09-24 PROCEDURE — 36415 COLL VENOUS BLD VENIPUNCTURE: CPT | Performed by: INTERNAL MEDICINE

## 2024-09-24 PROCEDURE — 85014 HEMATOCRIT: CPT | Performed by: INTERNAL MEDICINE

## 2024-09-24 PROCEDURE — 250N000013 HC RX MED GY IP 250 OP 250 PS 637: Performed by: INTERNAL MEDICINE

## 2024-09-24 PROCEDURE — 80048 BASIC METABOLIC PNL TOTAL CA: CPT | Performed by: INTERNAL MEDICINE

## 2024-09-24 PROCEDURE — 82310 ASSAY OF CALCIUM: CPT | Performed by: INTERNAL MEDICINE

## 2024-09-24 PROCEDURE — 120N000004 HC R&B MS OVERFLOW

## 2024-09-24 PROCEDURE — 250N000012 HC RX MED GY IP 250 OP 636 PS 637: Performed by: INTERNAL MEDICINE

## 2024-09-24 RX ADMIN — FUROSEMIDE 20 MG: 20 TABLET ORAL at 09:00

## 2024-09-24 RX ADMIN — PREDNISONE 10 MG: 10 TABLET ORAL at 09:00

## 2024-09-24 RX ADMIN — FOLIC ACID 1 MG: 1 TABLET ORAL at 09:00

## 2024-09-24 RX ADMIN — Medication 1 G: at 21:20

## 2024-09-24 RX ADMIN — APIXABAN 2.5 MG: 2.5 TABLET, FILM COATED ORAL at 21:19

## 2024-09-24 RX ADMIN — URSODIOL 250 MG: 250 TABLET ORAL at 08:59

## 2024-09-24 RX ADMIN — CALCITRIOL CAPSULES 0.25 MCG 0.25 MCG: 0.25 CAPSULE ORAL at 09:00

## 2024-09-24 RX ADMIN — EZETIMIBE 10 MG: 10 TABLET ORAL at 09:00

## 2024-09-24 RX ADMIN — METOPROLOL SUCCINATE 25 MG: 25 TABLET, EXTENDED RELEASE ORAL at 09:00

## 2024-09-24 RX ADMIN — GABAPENTIN 300 MG: 250 SUSPENSION ORAL at 21:20

## 2024-09-24 RX ADMIN — MEROPENEM 1 G: 1 INJECTION, POWDER, FOR SOLUTION INTRAVENOUS at 15:48

## 2024-09-24 RX ADMIN — MEROPENEM 1 G: 1 INJECTION, POWDER, FOR SOLUTION INTRAVENOUS at 03:01

## 2024-09-24 RX ADMIN — LEVOTHYROXINE SODIUM 175 MCG: 0.17 TABLET ORAL at 09:00

## 2024-09-24 RX ADMIN — SIROLIMUS 1 MG: 0.5 TABLET, FILM COATED ORAL at 09:00

## 2024-09-24 RX ADMIN — APIXABAN 2.5 MG: 2.5 TABLET, FILM COATED ORAL at 08:59

## 2024-09-24 RX ADMIN — SITAGLIPTIN 50 MG: 50 TABLET, FILM COATED ORAL at 09:00

## 2024-09-24 RX ADMIN — URSODIOL 250 MG: 250 TABLET ORAL at 21:21

## 2024-09-24 RX ADMIN — Medication 1 G: at 09:12

## 2024-09-24 ASSESSMENT — ACTIVITIES OF DAILY LIVING (ADL)
ADLS_ACUITY_SCORE: 37
DEPENDENT_IADLS:: INDEPENDENT;MEAL PREPARATION
ADLS_ACUITY_SCORE: 37

## 2024-09-24 NOTE — CONSULTS
Care Management Initial Consult    General Information  Assessment completed with: Patient,    Type of CM/SW Visit: Initial Assessment    Primary Care Provider verified and updated as needed:     Readmission within the last 30 days:        Reason for Consult: discharge planning  Advance Care Planning:            Communication Assessment  Patient's communication style: spoken language (English or Bilingual)    Hearing Difficulty or Deaf: yes   Wear Glasses or Blind: yes    Cognitive  Cognitive/Neuro/Behavioral: WDL  Level of Consciousness: alert  Arousal Level: opens eyes spontaneously  Orientation: oriented x 4  Mood/Behavior: calm  Best Language: 0 - No aphasia  Speech: clear    Living Environment:   People in home: alone     Current living Arrangements: independent living facility      Able to return to prior arrangements: yes       Family/Social Support:  Care provided by:    Provides care for: no one  Marital Status:   Support system: Children          Description of Support System: Supportive, Involved         Current Resources:   Patient receiving home care services: No        Community Resources:    Equipment currently used at home: walker, rolling, cane, straight  Supplies currently used at home:      Employment/Financial:  Employment Status:          Financial Concerns:             Does the patient's insurance plan have a 3 day qualifying hospital stay waiver?  No    Lifestyle & Psychosocial Needs:  Social Determinants of Health     Food Insecurity: Low Risk  (9/22/2024)    Food Insecurity     Within the past 12 months, did you worry that your food would run out before you got money to buy more?: No     Within the past 12 months, did the food you bought just not last and you didn t have money to get more?: No   Depression: Not at risk (9/9/2024)    PHQ-2     PHQ-2 Score: 1   Housing Stability: High Risk (9/22/2024)    Housing Stability     Do you have housing? : No     Are you worried about losing  your housing?: No   Tobacco Use: Medium Risk (9/9/2024)    Patient History     Smoking Tobacco Use: Former     Smokeless Tobacco Use: Never     Passive Exposure: Not on file   Financial Resource Strain: Low Risk  (9/22/2024)    Financial Resource Strain     Within the past 12 months, have you or your family members you live with been unable to get utilities (heat, electricity) when it was really needed?: No   Alcohol Use: Not At Risk (11/9/2023)    Received from [facility]    AUDIT-C     Frequency of Alcohol Consumption: Never     Average Number of Drinks: Patient does not drink     Frequency of Binge Drinking: Never   Transportation Needs: Low Risk  (9/22/2024)    Transportation Needs     Within the past 12 months, has lack of transportation kept you from medical appointments, getting your medicines, non-medical meetings or appointments, work, or from getting things that you need?: No   Physical Activity: Insufficiently Active (11/9/2023)    Received from [facility]    Exercise Vital Sign     Days of Exercise per Week: 5 days     Minutes of Exercise per Session: 10 min   Interpersonal Safety: Low Risk  (9/22/2024)    Interpersonal Safety     Do you feel physically and emotionally safe where you currently live?: Yes     Within the past 12 months, have you been hit, slapped, kicked or otherwise physically hurt by someone?: No     Within the past 12 months, have you been humiliated or emotionally abused in other ways by your partner or ex-partner?: No   Stress: No Stress Concern Present (11/9/2023)    Received from [facility]    [place] [employer] - Occupational Stress Questionnaire     Feeling of Stress : Only a little   Social Connections: Feeling Socially Integrated (12/20/2023)    Received from [facility]    OASIS : Social Isolation     Frequency of experiencing loneliness or isolation: Never   Health Literacy: Not on file       Functional Status:  Prior to admission patient needed  assistance:   Dependent ADLs:: Ambulation-walker, Ambulation-cane  Dependent IADLs:: Independent, Meal Preparation       Discussed  Partnership in Safe Discharge Planning  document with patient/family: No, no discharge planning concerns    Additional Information:  Consult placed for discharge planning and assistance with IV antibiotics on discharge.  Patient admitted for nausea, vomiting, urinary frequency, weakness and hypotension. Therapy recommendations are for home with home care and will need extended course of IV anbx per ID.  Met with patient at the bedside to introduce CM role and discuss discharge planning. Patient confirms she lives at [facility]. She only receives 2 meals a day and has a pendant through them. She is otherwise independent with her ADLs and IADLs. She uses a walker in the facility or in the community and a cane in her apartment. She is still able to drive. Her son would be able to provide transportation home on discharge.  Patient states that [facility] has their own in house therapy and she would like to do this if possible. She has also done home IV anbx in the past and would be comfortable doing this as well.      Next Steps: Have reached out to [facility] in house therapy [facility]. Left voice mail for Cherry [phone]  Benefit check sent to [facility] for home IV antibiotics.  CM will continue to follow for discharge planning.    Addendum 1215:  Received call back from Cherry at [facility] [phone]. They can accept the patient for home PT/OT. They would like a phone call when we know a discharge date and home care discharge orders should be faxed to [phone]      Jenn Will RN BSN OCN  Care Coordinator  [facility]  [phone]

## 2024-09-24 NOTE — CONSULTS
"SPIRITUAL HEALTH SERVICES - Consult Note  ICU  Referral Source/Reason for Visit: [facility] Consult    Summary and Recommendations -  She reported big challenge for her change of house and change of car.  Four children's support her and help her.     Plan: [facility] remains available upon request.    Mejia Goodwin M.Div.  Intern    Pager: [phone]     Assessment    Saw pt Luz Thompson per [facility] consult to assess emotional needs because patient responded \"Yes\" to the question in the admission assessment, \"Do you have any spiritual or Spiritism beliefs that will affect your care?\".    Patient/Family Understanding of Illness and Goals of Care -         Pt. understands that she is at  due to infection and generalized weakness.        Reflectively listened to pt. Discuss her plan discharge to home today or tomorrow.    Distress and Loss -         She reported distress divorce from her .        She reported big challenge for her change of house and change of car.    Strengths, Coping, and Resources - Four children's support her and help her.     Meaning, Beliefs, and Spirituality - Pt. is Protestant. She looks to her [employer] tradition as a source of support. Welcomed prayer.  "

## 2024-09-24 NOTE — PLAN OF CARE
"Goal Outcome Evaluation:                 Outcome Evaluation: Patient alert and oriented. ambulating in room and up in chair. tolerating diet. remains on iv antibiotics. plan for possible discharge tomorrow.      Problem: Adult Inpatient Plan of Care  Goal: Plan of Care Review  Description: The Plan of Care Review/Shift note should be completed every shift.  The Outcome Evaluation is a brief statement about your assessment that the patient is improving, declining, or no change.  This information will be displayed automatically on your shift  note.  Outcome: Progressing  Flowsheets (Taken 9/24/2024 1713)  Outcome Evaluation: Patient alert and oriented. ambulating in room and up in chair. tolerating diet. remains on iv antibiotics. plan for possible discharge tomorrow.  Goal: Patient-Specific Goal (Individualized)  Description: You can add care plan individualizations to a care plan. Examples of Individualization might be:  \"Parent requests to be called daily at 9am for status\", \"I have a hard time hearing out of my right ear\", or \"Do not touch me to wake me up as it startles  me\".  Outcome: Progressing  Goal: Absence of Hospital-Acquired Illness or Injury  Outcome: Progressing  Intervention: Identify and Manage Fall Risk  Recent Flowsheet Documentation  Taken 9/24/2024 0855 by Elo Redmond RN  Safety Promotion/Fall Prevention: activity supervised  Intervention: Prevent Skin Injury  Recent Flowsheet Documentation  Taken 9/24/2024 0855 by Elo Redmond RN  Body Position: weight shifting  Goal: Optimal Comfort and Wellbeing  Outcome: Progressing  Intervention: Provide Person-Centered Care  Recent Flowsheet Documentation  Taken 9/24/2024 0855 by Elo Redmond RN  Trust Relationship/Rapport:   care explained   choices provided   empathic listening provided   emotional support provided   questions answered   questions encouraged   reassurance provided   thoughts/feelings acknowledged  Goal: " Readiness for Transition of Care  Outcome: Progressing     Problem: Sepsis/Septic Shock  Goal: Optimal Coping  Outcome: Progressing  Intervention: Optimize Psychosocial Adjustment to Illness  Recent Flowsheet Documentation  Taken 9/24/2024 0855 by Elo Redmond RN  Supportive Measures: active listening utilized  Goal: Absence of Bleeding  Outcome: Progressing  Goal: Blood Glucose Level Within Targeted Range  Outcome: Progressing  Goal: Absence of Infection Signs and Symptoms  Outcome: Progressing  Intervention: Initiate Sepsis Management  Recent Flowsheet Documentation  Taken 9/24/2024 0855 by Elo Redmond RN  Isolation Precautions: contact precautions maintained  Intervention: Promote Recovery  Recent Flowsheet Documentation  Taken 9/24/2024 1625 by Elo Redmond RN  Activity Management:   ambulated in room   ambulated to bathroom  Taken 9/24/2024 1445 by Elo Redmond RN  Activity Management:   ambulated in room   ambulated to bathroom  Taken 9/24/2024 0855 by Elo Redmond RN  Activity Management: activity adjusted per tolerance  Goal: Optimal Nutrition Intake  Outcome: Progressing     Problem: Comorbidity Management  Goal: Blood Glucose Levels Within Targeted Range  Outcome: Progressing  Intervention: Monitor and Manage Glycemia  Recent Flowsheet Documentation  Taken 9/24/2024 0855 by Elo Redmond RN  Medication Review/Management: medications reviewed     Problem: Skin Injury Risk Increased  Goal: Skin Health and Integrity  Outcome: Progressing  Intervention: Plan: Nurse Driven Intervention: Moisture Management  Recent Flowsheet Documentation  Taken 9/24/2024 1625 by Elo Redmond RN  Moisture Interventions: Urinary collection device  Taken 9/24/2024 1445 by Elo Redmond RN  Bathing/Skin Care:   wipes, CHG   dressed/undressed  Intervention: Plan: Nurse Driven Intervention: Friction and Shear  Recent Flowsheet Documentation  Taken 9/24/2024 1625  by Elo Redmond, RN  Friction/Shear Interventions: Repositioning device (TAP system, etc.)  Intervention: Optimize Skin Protection  Recent Flowsheet Documentation  Taken 9/24/2024 1625 by Elo Redmond, RN  Activity Management:   ambulated in room   ambulated to bathroom  Taken 9/24/2024 1445 by Elo Redmond, RN  Activity Management:   ambulated in room   ambulated to bathroom  Taken 9/24/2024 0855 by Elo Redmond, RN  Activity Management: activity adjusted per tolerance  Head of Bed (HOB) Positioning: HOB at 30 degrees

## 2024-09-24 NOTE — PROVIDER NOTIFICATION
Writer asked per patient request if antibiotic was needed for possible uti. MD states patient does not require antibiotic per ua/uc result

## 2024-09-24 NOTE — PROGRESS NOTES
[facility]  Infectious Disease Progress Note          Assessment and Plan:   Date of Admission:  9/22/2024  Date of Consult: 09/22/24     Assessment:  74YF with hx Sjogren's syndrome, of liver transplantation in 2002 for primary biliary cirrhosis -on immunosuppressive therapy with prednisone and sirolimus, CKD with recurrent urinary tract infections with MDRO including ESBL producers, who has been hospitalized due to nausea, vomiting, urinary frequency and weakness, was febrile and hypotensive , and has been admitted with sepsis likely of urinary source     -Septic shock  -Likely UTI  -DERREK superimposed on CKA, hypokalemia  -s/p liver transplantation, on immunosuppressive therapy with prednisone and sirolimus  -Multiple antibiotic allergies -PCN, cephalosporins, Azithromycin, Doxycycline, Quinolones  -Diabetes  -Chronic medical illnesses -atrial fibrillation, lymphedema, HTN, hyperlipidemia, mitral regurgitation, CVA     Recommendations:  ESBL organism is quinolone sensitive, but discussed her allergies in detail, Cipro more of a side effect issue than allergy but she has been unable to take it thus really no option here except for an IV course  Continue Meropenem with the plan to go to ertapenem, also renal adjusted when improved enough  Discussed in detail,  has a history of recurrent UTIs largely ESBL, most of them being treated in [place] has an ID doctor there, it looks like urology here he referred her to ID at the  but now that we have seen her probably us to follow.  Needs a primary physician to be the initial screener for UTIs etc. not the role for us  Looks like no home IV coverage, option obviously would be to either self-pay or come into the infusion center for infusions        Interval History:     no new complaints and doing well; no cp, sob, n/v/d, or abd pain.  Feels significantly better, long discussion with the patient about her allergies, prior UTIs, IV antibiotics etc.               Medications:     Current Facility-Administered Medications   Medication Dose Route Frequency Provider Last Rate Last Admin    apixaban ANTICOAGULANT (ELIQUIS) tablet 2.5 mg  2.5 mg Oral BID Herman Barahona MD   2.5 mg at 09/24/24 0859    calcitRIOL (ROCALTROL) capsule 0.25 mcg  0.25 mcg Oral Daily Herman Barahona MD   0.25 mcg at 09/24/24 0900    estradiol (ESTRACE) cream 2 g  2 g Vaginal Every Other Day Herman Barahona MD   2 g at 09/23/24 1404    evolocumab (REPATHA) injection 140 mg  140 mg Subcutaneous Q14 Days Herman Barahona MD   140 mg at 09/23/24 1404    ezetimibe (ZETIA) tablet 10 mg  10 mg Oral Daily Herman Barahona MD   10 mg at 09/24/24 0900    fish oil-omega-3 fatty acids capsule 1 g  1 g Oral BID Herman Barahona MD   1 g at 09/24/24 0912    folic acid (FOLVITE) tablet 1 mg  1 mg Oral Daily Herman Barahona MD   1 mg at 09/24/24 0900    furosemide (LASIX) tablet 20 mg  20 mg Oral Daily Herman Barahona MD   20 mg at 09/24/24 0900    gabapentin (NEURONTIN) solution 300 mg  300 mg Oral At Bedtime Herman Barahona MD   300 mg at 09/23/24 2217    insulin aspart (NovoLOG) injection (RAPID ACTING)  1-7 Units Subcutaneous TID AC Herman Barahona MD   2 Units at 09/23/24 1741    insulin aspart (NovoLOG) injection (RAPID ACTING)  1-5 Units Subcutaneous At Bedtime Herman Barahona MD   1 Units at 09/22/24 2240    levothyroxine (SYNTHROID/LEVOTHROID) tablet 175 mcg  175 mcg Oral Daily Herman Barahona MD   175 mcg at 09/24/24 0900    meropenem (MERREM) 1 g vial to attach to  mL bag  1 g Intravenous Q12H Herman Barahona MD   1 g at 09/24/24 0301    metoprolol succinate ER (TOPROL XL) 24 hr tablet 25 mg  25 mg Oral Daily Herman Barahona MD   25 mg at 09/24/24 0900    predniSONE (DELTASONE) tablet 10 mg  10 mg Oral Daily Herman Barahona MD   10 mg at 09/24/24 0900    sirolimus (GENERIC EQUIVALENT) tablet 1 mg  1 mg Oral Daily Herman Barahona MD   1 mg at 09/24/24 0900    sitagliptin (JANUVIA) tablet 50 mg   "50 mg Oral Daily Herman Barahona MD   50 mg at 09/24/24 0900    sodium chloride (PF) 0.9% PF flush 3 mL  3 mL Intracatheter Q8H Herman Barahona MD   3 mL at 09/24/24 0921    ursodiol (ACTIGALL) tablet 250 mg  250 mg Oral BID Herman Barahona MD   250 mg at 09/24/24 0859                  Physical Exam:   Blood pressure 131/63, pulse 64, temperature 98.4  F (36.9  C), temperature source Oral, resp. rate 16, height 1.702 m (5' 7\"), weight 80.7 kg (177 lb 14.4 oz), last menstrual period 06/01/1988, SpO2 96%, not currently breastfeeding.  Wt Readings from Last 2 Encounters:   09/22/24 80.7 kg (177 lb 14.4 oz)   09/09/24 78.5 kg (173 lb)     Vital Signs with Ranges  Temp:  [97.4  F (36.3  C)-98.4  F (36.9  C)] 98.4  F (36.9  C)  Pulse:  [59-64] 64  Resp:  [11-16] 16  BP: (131-169)/(63-88) 131/63  SpO2:  [95 %-100 %] 96 %    Constitutional: Awake, alert, cooperative, no apparent distress     Lungs: Clear to auscultation bilaterally, no crackles or wheezing   Cardiovascular: Regular rate and rhythm, normal S1 and S2, and no murmur noted   Abdomen: Normal bowel sounds, soft, non-distended, non-tender   Skin: No rashes, no cyanosis, no edema   Other:           Data:   All microbiology laboratory data reviewed.  Recent Labs   Lab Test 09/24/24  1048 09/23/24  0610 09/22/24  0144   WBC 15.6* 17.2* 5.1   HGB 11.0* 10.1* 12.9   HCT 36.4 32.5* 41.5   MCV 97 98 96    284 383     Recent Labs   Lab Test 09/24/24  1048 09/23/24  0610 09/22/24  0710   CR 1.46* 1.60* 1.69*     Recent Labs   Lab Test 08/26/19  2331   SED 78*     Recent Labs   Lab Test 08/30/19  0657 08/29/19  0544 08/28/19  1718 08/28/19  1710 08/27/19  0224 08/27/19  0150 08/26/19  2331 09/27/18  1912 07/30/18  0852   CULT No growth No growth No growth No growth 10,000 to 50,000 colonies/mL  Escherichia coli  * Cultured on the 1st day of incubation:  Escherichia coli  Susceptibility testing done on previous specimen  *  Critical Value/Significant Value, " preliminary result only, called to and read back by   Maria Teresa Martines RN 08/27/2019 @1425 dk/hdp   Cultured on the 1st day of incubation:  Escherichia coli  *  Critical Value/Significant Value, preliminary result only, called to and read back by  [facility] at [id number] [id number]    (Note)  POSITIVE for E.COLI by Verigene multiplex nucleic acid test. Final  identification and antimicrobial susceptibility testing will be  verified by standard methods. Verigene test will not distinguish  E.coli from Shigella species including S.dysenteriae, S.flexneri,  S.boydii, and S.sonnei. Specimens containing Shigella species or  E.coli will be reported as Positive for E.coli.    Specimen tested with Verigene multiplex, gram-negative blood culture  nucleic acid test for the following targets: Acinetobacter sp.,  Citrobacter sp., Enterobacter sp., Proteus sp., E. coli, K.  pneumoniae/oxytoca, P. aeruginosa, and the following resistance  markers: CTXM, KPC, NDM, VIM, IMP and OXA.    Critical Value/Significant Value called to and read back by  Flower Lujan Rn @ 6181 8.27.19 CS     Moderate growth  Staphylococcus aureus  * 50,000 to 100,000 colonies/mL  mixed urogenital louann  Susceptibility testing not routinely done

## 2024-09-24 NOTE — PROGRESS NOTES
Therapy: IV ABX  Insurance: Medicare, [employer]etna supplemental     Pt unfortunately does not have coverage for IV ABX through their Medicare/Aetna supplemental plans. Pt must agree to self-pay for coverage. We would bill the pt's Part D and they would be responsible for the remaining co-pay and supply costs. Based off the Ertapenem 1g Q24 the pt's spq estimate came out to $59.16 per day for drug and supplies.    For nursing, pt should have coverage if homebound, however [facility]"" is not contracted with Medicare and an outside nursing agency would be utilized instead. If pt is not homebound, there is no coverage and \[facility]\"" can provide nursing if pt agrees to self-pay an additional $90 per visit.    In reference to referral from [facility] on pt admitted 09/22/24 to check IV ABX coverage.    Please contact Intake with any questions, [phone] or In Basket pool, [facility] ([phone]).

## 2024-09-24 NOTE — PROGRESS NOTES
[facility]    Received request for benefit check should pt require home IV abx. Unfortunately, [place] does not have coverage for IV ABX through their Medicare/[employer] supplemental plans. Pt must agree to self-pay for coverage. We would bill the pt's Part D and they would be responsible for the remaining co-pay and supply costs.     The estimated out of pocket cost for Ertapenem 1g Q24 is $59.16 per day for drug and supplies.    For nursing, pt should have coverage if homebound, however [facility] is not contracted with Medicare and an outside nursing agency would be utilized instead. If pt is not homebound, there is no coverage and [facility] can provide nursing if pt agrees to self-pay an additional $90 per visit.     [facility] has no line preference.     I spoke with [place] to introduce home infusion services and review benefits and offer choice of providers. She is okay with the out of pocket cost and would like proceed with [facility] for home IV abx.     Thank you for the referral    Maria Luisa Moura RN  [facility] Liaison  [phone] (Mon thru Fri 8am - 5pm)  [phone] Office

## 2024-09-24 NOTE — PLAN OF CARE
"Pt slept well through night, used purewick while sleeping. Pt refused Novolog. Pt is wondering about BP meds being increased? Pt makes needs known, will cont plan of care    Problem: Adult Inpatient Plan of Care  Goal: Plan of Care Review  Description: The Plan of Care Review/Shift note should be completed every shift.  The Outcome Evaluation is a brief statement about your assessment that the patient is improving, declining, or no change.  This information will be displayed automatically on your shift  note.  Outcome: Progressing  Flowsheets (Taken 9/24/2024 0738)  Plan of Care Reviewed With: patient  Goal: Patient-Specific Goal (Individualized)  Description: You can add care plan individualizations to a care plan. Examples of Individualization might be:  \"Parent requests to be called daily at 9am for status\", \"I have a hard time hearing out of my right ear\", or \"Do not touch me to wake me up as it startles  me\".  Outcome: Progressing  Goal: Absence of Hospital-Acquired Illness or Injury  Outcome: Progressing  Intervention: Identify and Manage Fall Risk  Recent Flowsheet Documentation  Taken 9/23/2024 2100 by Melody Moon RN  Safety Promotion/Fall Prevention: activity supervised  Intervention: Prevent Skin Injury  Recent Flowsheet Documentation  Taken 9/23/2024 2100 by Melody Moon RN  Body Position: position changed independently  Goal: Optimal Comfort and Wellbeing  Outcome: Progressing  Intervention: Provide Person-Centered Care  Recent Flowsheet Documentation  Taken 9/23/2024 2100 by Melody Moon RN  Trust Relationship/Rapport:   care explained   choices provided   empathic listening provided   emotional support provided   questions answered   questions encouraged   reassurance provided   thoughts/feelings acknowledged  Goal: Readiness for Transition of Care  Outcome: Progressing     Problem: Sepsis/Septic Shock  Goal: Optimal Coping  Outcome: Progressing  Intervention: Optimize " Psychosocial Adjustment to Illness  Recent Flowsheet Documentation  Taken 9/23/2024 2100 by Melody Moon RN  Supportive Measures: active listening utilized  Goal: Absence of Bleeding  Outcome: Progressing  Intervention: Monitor and Manage Bleeding  Recent Flowsheet Documentation  Taken 9/23/2024 2100 by Melody Moon RN  Bleeding Precautions: blood pressure closely monitored  Goal: Blood Glucose Level Within Targeted Range  Outcome: Progressing  Goal: Absence of Infection Signs and Symptoms  Outcome: Progressing  Intervention: Initiate Sepsis Management  Recent Flowsheet Documentation  Taken 9/23/2024 2100 by Melody Moon RN  Isolation Precautions: contact precautions maintained  Intervention: Promote Recovery  Recent Flowsheet Documentation  Taken 9/23/2024 2100 by Melody Moon RN  Activity Management: activity adjusted per tolerance  Goal: Optimal Nutrition Intake  Outcome: Progressing     Problem: Comorbidity Management  Goal: Blood Glucose Levels Within Targeted Range  Outcome: Progressing  Intervention: Monitor and Manage Glycemia  Recent Flowsheet Documentation  Taken 9/23/2024 2100 by Melody Moon RN  Medication Review/Management: medications reviewed     Problem: Skin Injury Risk Increased  Goal: Skin Health and Integrity  Outcome: Progressing  Intervention: Optimize Skin Protection  Recent Flowsheet Documentation  Taken 9/23/2024 2100 by Melody Moon RN  Activity Management: activity adjusted per tolerance  Head of Bed (HOB) Positioning: HOB at 30 degrees   Goal Outcome Evaluation:      Plan of Care Reviewed With: patient

## 2024-09-24 NOTE — PLAN OF CARE
Goal Outcome Evaluation:      Plan of Care Reviewed With: patient    Overall Patient Progress: improvingOverall Patient Progress: improving    Outcome Evaluation: Patient resides at [facility]. Plan to return home utilizing [facility] in house therapy program and will need extended course of home IV anbx.

## 2024-09-24 NOTE — PROGRESS NOTES
"[facility]    Medicine Progress Note - Hospitalist Service    Date of Admission:  9/22/2024    Assessment & Plan   Luz Thompson is a 74 year old female who presented to the ED on 9/22/2024 with concern for feeling weak and nauseated. She was concerned about a UTI.  Of major concern, she rather abruptly became hypotensive in the ED for which reason she had a central line placed and was admitted to the ICU.      PMH is notable for PBC s/p liver transplant in 2002 on chronic immunosuppression, ESBL/VRE infections, frequent UTIs, paroxysmal A-fib on Eliquis with loop recorder in place, DVT, DM 2, lymphedema, HTN, HLD, CVA, hypothyroidism, Sjogren syndrome, anxiety, glaucoma, anemia, chronic back pain, CKD stage IIIb, and osteoporosis.     \" Per Blood pressure 179/106 with repeat 159/82, tachycardic at 118, temperature 100.5  F, oxygen 99% on room air.  Initial lactic acid elevated at 4.0.  WBC 5.1, hemoglobin 12.9, platelet count 383.  Creatinine is 1.73 otherwise BMP unremarkable.  Glucose is 182.  LFTs within normal limits.  Procalcitonin 0.13.  UA shows 116 WBCs and moderate LE.  COVID-19, influenza A/B, RSV PCR is negative.  Chest x-ray does not show any infiltrate.  EKG shows sinus tachycardia.  She received IV meropenem, gabapentin, acetaminophen 1 g, and 2 L NS bolus.  Repeat lactic acid is still elevated at 2.9 so she is receiving additional 500 mL of NS now.  Admit inpatient.  Addendum: Became hypotensive to 83/40 lactic acid still elevated 2.9.  Received additional 1 L LR, but remains hypotensive the low 90s systolic.  Central line placed.  Starting on norepinephrine and admitting to ICU.\"    Blood cx grew GNR which is identified by [employer] as Klebsiella oxytoca with ESBL.      Diagnoses:  Septic shock thought due to UTI. Required peripheral norepi briefly but is now stable without pressors.   Immunosuppressed on Sirolimus and prednisone for Liver transplant (2007).   Blood and " "urine cultures positive for ESBL Klebsiella which she has previously had.  Notably, a late addition to the blood culture collected on 9/22 includes 1/2 bottles Pseudomonas aeruginosa also susceptible to meropenem.  Pt also has multiple antibiotic allergies, though no anaphylaxis recorded. Plan is for her to get a midline and be discharged on Ertapenem daily to complete 10-14 days.   CKD stage 3B-4.  NIDDM.  Control is very good with minimal additional insulin. (She has remained on her Sitagliptin.)  Paroxysmal A fib, poss atrial tachycardia with SSS.  More often with issues with tachycardia.  Has an implanted monitor (Vigilos Linq 2 device). Managed with rate-control strategy.   Hypothyroidism.   PLAN:  Cont with meropenem. ID is planning on continuing IV antibiotics as an outpatient and the pt will need a midline (or possibly PICC) to complete the course of abx.   ID guiding care.  Weaned off of Epi within about 12 hours.  She was given 24 hours of Solucortef, but has been able to return to her usual home dose of Prednisone 10 mg daily without problem.  Insulin sliding scale.   Usual home meds restarted.          Diet: Combination Diet Regular Diet Adult    DVT Prophylaxis: DOAC  Ron Catheter: Not present  Lines: None       Cardiac Monitoring: None  Code Status: Full Code      Clinically Significant Risk Factors                    # Hypertension: Noted on problem list          # DMII: A1C = 6.9 % (Ref range: <=5.7 %) within past 6 months, PRESENT ON ADMISSION  # Overweight: Estimated body mass index is 27.86 kg/m  as calculated from the following:    Height as of this encounter: 1.702 m (5' 7\").    Weight as of this encounter: 80.7 kg (177 lb 14.4 oz)., PRESENT ON ADMISSION            Disposition Plan     Medically Ready for Discharge: Anticipated Tomorrow        Herman Barahona MD  Hospitalist Service  [facility]  Securely message with [employer] (more info)  Text page via [employer] " Paging/Directory   ______________________________________________________________________    Interval History   Doing well. No complaints. Ready for discharge after the midline is placed.  Probably tomorrow.    Physical Exam   Vital Signs: Temp: 97.8  F (36.6  C) Temp src: Temporal BP: (!) 149/74 Pulse: 59   Resp: 16 SpO2: 100 % O2 Device: None (Room air)    Weight: 177 lbs 14.4 oz    General Appearance: Alert, coherent in NAD. Word choice and thought processes intact.   Respiratory: no increased WOB  Cardiovascular: RRR without murmur  GI: soft, NTND.    Skin: no lesions. Trace edema bilat LEs. No erythema  Other:      Medical Decision Making       Non-billable MINUTES SPENT BY ME on the date of service doing chart review, history, exam, documentation & further activities per the note.      Data   Results for orders placed or performed during the hospital encounter of 09/22/24 (from the past 24 hour(s))   Glucose by meter   Result Value Ref Range    GLUCOSE BY METER POCT 223 (H) 70 - 99 mg/dL   Glucose by meter   Result Value Ref Range    GLUCOSE BY METER POCT 193 (H) 70 - 99 mg/dL   Glucose by meter   Result Value Ref Range    GLUCOSE BY METER POCT 203 (H) 70 - 99 mg/dL   Glucose by meter   Result Value Ref Range    GLUCOSE BY METER POCT 162 (H) 70 - 99 mg/dL   Glucose by meter   Result Value Ref Range    GLUCOSE BY METER POCT 152 (H) 70 - 99 mg/dL   CBC with platelets   Result Value Ref Range    WBC Count 15.6 (H) 4.0 - 11.0 10e3/uL    RBC Count 3.76 (L) 3.80 - 5.20 10e6/uL    Hemoglobin 11.0 (L) 11.7 - 15.7 g/dL    Hematocrit 36.4 35.0 - 47.0 %    MCV 97 78 - 100 fL    MCH 29.3 26.5 - 33.0 pg    MCHC 30.2 (L) 31.5 - 36.5 g/dL    RDW 15.2 (H) 10.0 - 15.0 %    Platelet Count 313 150 - 450 10e3/uL     *Note: Due to a large number of results and/or encounters for the requested time period, some results have not been displayed. A complete set of results can be found in Results Review.

## 2024-09-25 VITALS
HEIGHT: 67 IN | HEART RATE: 68 BPM | RESPIRATION RATE: 16 BRPM | WEIGHT: 176.15 LBS | BODY MASS INDEX: 27.65 KG/M2 | SYSTOLIC BLOOD PRESSURE: 145 MMHG | DIASTOLIC BLOOD PRESSURE: 70 MMHG | OXYGEN SATURATION: 96 % | TEMPERATURE: 98.4 F

## 2024-09-25 LAB
ANION GAP SERPL CALCULATED.3IONS-SCNC: 11 MMOL/L (ref 7–15)
BUN SERPL-MCNC: 43 MG/DL (ref 8–23)
CALCIUM SERPL-MCNC: 8.8 MG/DL (ref 8.8–10.4)
CHLORIDE SERPL-SCNC: 106 MMOL/L (ref 98–107)
CREAT SERPL-MCNC: 1.56 MG/DL (ref 0.51–0.95)
EGFRCR SERPLBLD CKD-EPI 2021: 34 ML/MIN/1.73M2
ERYTHROCYTE [DISTWIDTH] IN BLOOD BY AUTOMATED COUNT: 15 % (ref 10–15)
GLUCOSE BLDC GLUCOMTR-MCNC: 136 MG/DL (ref 70–99)
GLUCOSE BLDC GLUCOMTR-MCNC: 152 MG/DL (ref 70–99)
GLUCOSE SERPL-MCNC: 140 MG/DL (ref 70–99)
HCO3 SERPL-SCNC: 27 MMOL/L (ref 22–29)
HCT VFR BLD AUTO: 31.6 % (ref 35–47)
HGB BLD-MCNC: 9.8 G/DL (ref 11.7–15.7)
MCH RBC QN AUTO: 29.8 PG (ref 26.5–33)
MCHC RBC AUTO-ENTMCNC: 31 G/DL (ref 31.5–36.5)
MCV RBC AUTO: 96 FL (ref 78–100)
PLATELET # BLD AUTO: 311 10E3/UL (ref 150–450)
POTASSIUM SERPL-SCNC: 3.7 MMOL/L (ref 3.4–5.3)
RBC # BLD AUTO: 3.29 10E6/UL (ref 3.8–5.2)
SODIUM SERPL-SCNC: 144 MMOL/L (ref 135–145)
WBC # BLD AUTO: 9.2 10E3/UL (ref 4–11)

## 2024-09-25 PROCEDURE — 99239 HOSP IP/OBS DSCHRG MGMT >30: CPT | Performed by: HOSPITALIST

## 2024-09-25 PROCEDURE — 99232 SBSQ HOSP IP/OBS MODERATE 35: CPT | Performed by: INTERNAL MEDICINE

## 2024-09-25 PROCEDURE — 36415 COLL VENOUS BLD VENIPUNCTURE: CPT | Performed by: INTERNAL MEDICINE

## 2024-09-25 PROCEDURE — 85027 COMPLETE CBC AUTOMATED: CPT | Performed by: INTERNAL MEDICINE

## 2024-09-25 PROCEDURE — 250N000013 HC RX MED GY IP 250 OP 250 PS 637: Performed by: INTERNAL MEDICINE

## 2024-09-25 PROCEDURE — 80048 BASIC METABOLIC PNL TOTAL CA: CPT | Performed by: INTERNAL MEDICINE

## 2024-09-25 PROCEDURE — 250N000011 HC RX IP 250 OP 636: Performed by: INTERNAL MEDICINE

## 2024-09-25 PROCEDURE — 36569 INSJ PICC 5 YR+ W/O IMAGING: CPT

## 2024-09-25 PROCEDURE — 250N000012 HC RX MED GY IP 250 OP 636 PS 637: Performed by: INTERNAL MEDICINE

## 2024-09-25 PROCEDURE — 82310 ASSAY OF CALCIUM: CPT | Performed by: INTERNAL MEDICINE

## 2024-09-25 PROCEDURE — 272N000748 HC KIT, CATH 3FR OR 4FR SINGLE LUMEN POWERMIDLINE

## 2024-09-25 RX ORDER — MEROPENEM 1 G/1
1 INJECTION, POWDER, FOR SOLUTION INTRAVENOUS EVERY 12 HOURS
Status: DISCONTINUED | OUTPATIENT
Start: 2024-09-25 | End: 2024-09-25 | Stop reason: HOSPADM

## 2024-09-25 RX ORDER — MEROPENEM 1 G/1
1 INJECTION, POWDER, FOR SOLUTION INTRAVENOUS EVERY 12 HOURS
Qty: 14 EACH | Refills: 0 | Status: SHIPPED | OUTPATIENT
Start: 2024-09-25 | End: 2024-10-02

## 2024-09-25 RX ORDER — ERTAPENEM 1 G/1
1 INJECTION, POWDER, LYOPHILIZED, FOR SOLUTION INTRAMUSCULAR; INTRAVENOUS EVERY 24 HOURS
Status: DISCONTINUED | OUTPATIENT
Start: 2024-09-25 | End: 2024-09-25

## 2024-09-25 RX ADMIN — CALCITRIOL CAPSULES 0.25 MCG 0.25 MCG: 0.25 CAPSULE ORAL at 09:08

## 2024-09-25 RX ADMIN — Medication 1 EACH: at 11:37

## 2024-09-25 RX ADMIN — PREDNISONE 10 MG: 10 TABLET ORAL at 09:08

## 2024-09-25 RX ADMIN — APIXABAN 2.5 MG: 2.5 TABLET, FILM COATED ORAL at 09:08

## 2024-09-25 RX ADMIN — ESTRADIOL 2 G: 0.1 CREAM VAGINAL at 09:07

## 2024-09-25 RX ADMIN — SIROLIMUS 1 MG: 0.5 TABLET, FILM COATED ORAL at 09:09

## 2024-09-25 RX ADMIN — Medication 1 G: at 09:09

## 2024-09-25 RX ADMIN — FOLIC ACID 1 MG: 1 TABLET ORAL at 09:08

## 2024-09-25 RX ADMIN — METOPROLOL SUCCINATE 25 MG: 25 TABLET, EXTENDED RELEASE ORAL at 09:08

## 2024-09-25 RX ADMIN — SITAGLIPTIN 50 MG: 50 TABLET, FILM COATED ORAL at 09:08

## 2024-09-25 RX ADMIN — MEROPENEM 1 G: 1 INJECTION, POWDER, FOR SOLUTION INTRAVENOUS at 03:07

## 2024-09-25 RX ADMIN — MEROPENEM 1 G: 1 INJECTION, POWDER, FOR SOLUTION INTRAVENOUS at 14:11

## 2024-09-25 RX ADMIN — LEVOTHYROXINE SODIUM 175 MCG: 0.17 TABLET ORAL at 09:09

## 2024-09-25 RX ADMIN — URSODIOL 250 MG: 250 TABLET ORAL at 09:08

## 2024-09-25 RX ADMIN — FUROSEMIDE 20 MG: 20 TABLET ORAL at 09:08

## 2024-09-25 ASSESSMENT — ACTIVITIES OF DAILY LIVING (ADL)
ADLS_ACUITY_SCORE: 37

## 2024-09-25 NOTE — PROGRESS NOTES
[facility]    [place] will discharge to home on meropenem 1g q12. I spoke with pt multiple times today to coordinate home IV abx and home care. Pt reports she anticipates being discharged today.     Out of pocket cost: original out of pocket cost given to pt was for ertapenem. I will update pt with new out of pocket cost for meropenem once known.     Home care: I spoke with Cherry from [facility] ([phone]) and they partner with [facility] for home care nursing. My office spoke with Dorcas ([phone]) from [facility] who confirmed they will accept for home nursing and [facility] will do home therapy.     Dosing times: Pt will dose at 11am/11pm in the home setting. She is requesting to start home dosing tomorrow at 11am which was approved by [facility]. Pt will need to dose her afternoon dose at [facility] prior to discharge home.     IV teaching: Pt requesting in-home RN visit for IV teaching which will be completed by [facility] tomorrow for first home dose.     Medication/supply delivery: medication and supplies will be delivered to pt's home this evening. Pt instructed on proper refrigeration.     Addendum @ 1250h: Updated out of pocket cost for meropenem is $43.84/day for drug and supplies. Pt updated and she's okay with the out of pocket cost.     Thank you for the referral.    Maria Luisa Moura RN  [facility] Liaison  [phone] (Mon thru Fri 8am - 5pm)  [phone] Office

## 2024-09-25 NOTE — PLAN OF CARE
Physical Therapy Discharge Summary    Reason for therapy discharge:    Discharged to home with home therapy.  And assist from family as needed.  Progress towards therapy goal(s). See goals on Care Plan in [facility] electronic health record for goal details.  Goals not met.  Barriers to achieving goals:   discharge from facility.  Pt amb 10' x2 with SEC and SBA/CGA at last session.    Therapy recommendation(s):    Continued therapy is recommended.  Rationale/Recommendations:  Recommend HHPT to increase strength, activity tolerance and progress ambulation.    Goal Outcome Evaluation:

## 2024-09-25 NOTE — DISCHARGE SUMMARY
[facility]  Hospitalist Discharge Summary      Date of Admission:  9/22/2024  Date of Discharge:  9/25/2024  Discharging Provider: Jose Daniel Diaz DO  Discharge Service: Hospitalist Service    Discharge Diagnoses   Septic shock, suspected 2/2 UTI  Multiple antibiotic allergies and intolerances  Hx liver transplant on immunosuppressants  DERREK on CKD stage 3  DMT2  Hypothyroidism  HX HTN, DLD, CVA      Follow-ups Needed After Discharge   Follow-up Appointments     Follow-up and recommended labs and tests       Follow up with primary care provider, Physician No Ref-Primary, within 7   days for hospital follow- up.  No follow up labs or test are needed.          Unresulted Labs Ordered in the Past 30 Days of this Admission       Date and Time Order Name Status Description    9/22/2024  1:48 AM Blood Culture Peripheral Blood Preliminary         These results will be followed up by PCP/ID    Discharge Disposition   Discharged to home w/HHC and IV antibiotics  Condition at discharge: Stable    Hospital Course    Luz Thompson is a 74 year old female with PMH including PBC s/p liver transplant in 2002 on chronic immunosuppression, ESBL/VRE infections, frequent UTIs, paroxysmal A-fib on Eliquis with loop recorder in place, DVT, DM 2, lymphedema, HTN, HLD, CVA, hypothyroidism, Sjogren syndrome, anxiety, glaucoma, anemia, chronic back pain, CKD stage IIIb, and osteoporosis who presents from her independent living facility due to nausea, vomiting, weakness and found to have septic shock, likely UTI    Septic shock likely 2/2 UTI  ESBL Klebsiella and pseudomonas bacteremia  Multiple antibiotic allergies  -Presenting with nausea, vomiting, urinary frequency, and weakness today concerning for UTI which she has had many of in the past.  Most recent urine culture 1 month ago was positive for ESBL Klebsiella oxytoca.  Follows with infectious disease in [place], but now moved back here and her previous ID  doctor has retired so she needs a new one.  She has numerous antibiotic allergies including ciprofloxacin, penicillin, cephalosporin, azithromycin, doxycycline, fluconazole, but she has tolerated meropenem in the past.   -In the ER she is tachycardic, temperature of 100.5  F, and has lactic acid of 4.0 consistent with severe sepsis.  Her WBC is normal at 5.1.  Procalcitonin 0.13.  UA shows 116 WBCs and moderate LE as source for infection.  COVID-19, influenza A/B, RSV PCR is negative.  Chest x-ray does not show any infiltrate.    -on admission went into shock and was placed on pressors, solucortef and meropenem with rapid improvement. Transitioned off pressors and back onto home prednisone 9/24  -blood cx positive for ESBL klebseilla and pseudomonas, urine cx with klebsiella  -seen by ID, suspect UTI source and will plan to cont Meropenem on discharge for additional 7 days (10 total)  -set up with home infusion services, [facility]     S/p liver transplant  Chronic immunosuppression  -She has history of liver transplant 2002 secondary to primary biliary cirrhosis.  She is chronically on prednisone 10 mg daily, sirolimus 1 mg daily, and ursodiol 250 mg twice daily      CKD stage IIIb: Has had a variable creatinine this year ranging from 1.5 up to 1.9 at times.  In the ER her creatinine is 1.73.  Received 2.5 L NS bolus for sepsis.  -Holding PTA furosemide  -BMP in the morning  -Resume PTA calcitriol     Lymphedema  Paroxysmal A-fib  HTN  HLD  Mild to moderate mitral regurgitation  History CVA: She has lymphedema with trace-1+ bilateral lower extremity pitting edema on exam with some wrinkling of the skin.  She has paroxysmal A-fib and is on Eliquis 2.5 mg twice daily and metoprolol succinate 25 mg daily with additional pill in pocket strategy, hydralazine 25 mg daily as needed for SBP greater than 160, Zetia 10 mg daily, and furosemide 20 mg daily.  She has a loop recorder in place.  She had history of multiple tiny likely  watershed infarcts in the setting of hypotension in 2001.  -Resume Eliquis, metoprolol, hydralazine as needed, and Zetia  -resume home lasix on discharge     Generalized weakness  -seen by PT back to independent living     Type II DM  -Resume linagliptin     Chronic back and left knee pain  -resume home regimen     Hypothyroidism  -Previous thyroid cancer and thyroidectomy.  Resume PTA levothyroxine 175 mcg daily.     Sjogren syndrome  Consultations This Hospital Stay   INFECTIOUS DISEASES IP CONSULT  PHYSICAL THERAPY ADULT IP CONSULT  OCCUPATIONAL THERAPY ADULT IP CONSULT  CARE MANAGEMENT / SOCIAL WORK IP CONSULT  CARE MANAGEMENT / SOCIAL WORK IP CONSULT  SPIRITUAL HEALTH SERVICES IP CONSULT  VASCULAR ACCESS ADULT IP CONSULT    Code Status   Full Code    Time Spent on this Encounter   IJose Daniel DO, personally saw the patient today and spent greater than 30 minutes discharging this patient.       Jose Daniel Diaz DO  [facility] ICU  [address] [place] [address]  Phone: [phone]  Fax: [phone]  ______________________________________________________________________    Physical Exam   Vital Signs: Temp: 98.6  F (37  C) Temp src: Temporal BP: (!) 143/69 Pulse: 66   Resp: 16 SpO2: 96 % O2 Device: None (Room air)    Weight: 176 lbs 2.36 oz  Face to face completed day of discharge       Primary Care Physician   Physician No Ref-Primary    Discharge Orders      Medication Therapy Management Referral      Home Infusion Referral      Home Care Referral      Reason for your hospital stay    Admitted for septic shock, UTI. Will complete additional week of IV antibiotics at home and follow-up closely with ID     Follow-up and recommended labs and tests     Follow up with primary care provider, Physician No Ref-Primary, within 7 days for hospital follow- up.  No follow up labs or test are needed.     Activity    Your activity upon discharge: activity as tolerated     Diet    Follow  this diet upon discharge: Current Diet:Orders Placed This Encounter      Combination Diet Regular Diet Adult       Significant Results and Procedures   Most Recent 3 CBC's:  Recent Labs   Lab Test 09/25/24  0558 09/24/24  1048 09/23/24  0610   WBC 9.2 15.6* 17.2*   HGB 9.8* 11.0* 10.1*   MCV 96 97 98    313 284     Most Recent 3 BMP's:  Recent Labs   Lab Test 09/25/24  0950 09/25/24  0558 09/25/24  0309 09/24/24  1635 09/24/24  1048 09/23/24  0759 09/23/24  0610   NA  --  144  --   --  140  --  142   POTASSIUM  --  3.7  --   --  3.5  --  4.9  4.9   CHLORIDE  --  106  --   --  103  --  107   CO2  --  27  --   --  24  --  20*   BUN  --  43.0*  --   --  37.9*  --  32.5*   CR  --  1.56*  --   --  1.46*  --  1.60*   ANIONGAP  --  11  --   --  13  --  15   KEILY  --  8.8  --   --  8.6*  --  8.2*   * 140* 152*   < > 144*   < > 237*    < > = values in this interval not displayed.     Most Recent 2 LFT's:  Recent Labs   Lab Test 09/22/24  0144 09/04/24  1008   AST 31 23   ALT 26 20   ALKPHOS 146 100   BILITOTAL 0.8 0.4     Most Recent 3 Hemoglobins:  Recent Labs   Lab Test 09/25/24  0558 09/24/24  1048 09/23/24  0610   HGB 9.8* 11.0* 10.1*     Most Recent 3 Troponin's:  Recent Labs   Lab Test 05/18/18  0731   TROPI <0.015     Most Recent 3 BNP's:  Recent Labs   Lab Test 06/13/23  1820   NTBNPI 1,275*     Most Recent D-dimer:No lab results found.    Discharge Medications   Current Discharge Medication List        START taking these medications    Details   meropenem (MERREM) 1 g vial Inject 1,000 mg (1 g) over 30 minutes into the vein every 12 hours for 7 days. Weekly CBC/diff,creat to Dittes  Qty: 14 each, Refills: 0    Associated Diagnoses: Bacteremia           CONTINUE these medications which have NOT CHANGED    Details   apixaban ANTICOAGULANT (ELIQUIS ANTICOAGULANT) 2.5 MG tablet Take 1 tablet (2.5 mg) by mouth 2 times daily  Qty: 180 tablet, Refills: 3    Associated Diagnoses: Paroxysmal atrial fibrillation       azelastine (ASTELIN) 0.1 % nasal spray Spray 1 spray into both nostrils as needed for allergies or rhinitis.      calcitRIOL (ROCALTROL) 0.25 MCG capsule Take 1 capsule (0.25 mcg) by mouth daily.  Qty: 90 capsule, Refills: 3    Associated Diagnoses: Papillary thyroid carcinoma      D-MANNOSE PO Take 1 Scoop by mouth 2 times daily.      estradiol (ESTRACE) 0.1 MG/GM vaginal cream Place vaginally every other day.      evolocumab (REPATHA SURECLICK) 140 MG/ML prefilled autoinjector Inject 1 mL (140 mg) subcutaneously every 14 days. . Please have fasting labs drawn for further refills.  Qty: 6 mL, Refills: 3    Associated Diagnoses: Hyperlipidemia LDL goal <70; Statin intolerance; HDL deficiency; Type 2 diabetes mellitus with stage 3 chronic kidney disease, without long-term current use of insulin; Hypertriglyceridemia; H/O: CVA (cerebrovascular accident)      ezetimibe (ZETIA) 10 MG tablet TAKE 1 TABLET EVERY DAY  Qty: 90 tablet, Refills: 3    Associated Diagnoses: Hyperlipidemia LDL goal <70; Benign essential hypertension      Ferrous Sulfate 324 MG TBEC Take 1 tablet by mouth 2 times daily.      folic acid (FOLVITE) 1 MG tablet Take 1 tablet (1 mg) by mouth daily  Qty: 100 tablet, Refills: 0    Comments: Further fills thru PCP  Associated Diagnoses: Liver replaced by transplant      furosemide (LASIX) 20 MG tablet Take 1 tablet (20 mg) by mouth daily  Qty: 90 tablet, Refills: 3    Associated Diagnoses: CKD (chronic kidney disease) stage 3, GFR 30-59 ml/min; Liver replaced by transplant      gabapentin (NEURONTIN) 250 MG/5ML solution Take 6 mLs (300 mg) by mouth at bedtime  Qty: 180 mL, Refills: 0    Associated Diagnoses: Liver replaced by transplant      glucose (BD GLUCOSE) 5 g chewable tablet Take 2 tablets (10 g) by mouth as needed (low blood sugar)  Qty: 40 tablet, Refills: 1    Associated Diagnoses: Type 2 diabetes mellitus with stage 3 chronic kidney disease, without long-term current use of insulin       hydrALAZINE (APRESOLINE) 25 MG tablet Take 1 tablet (25 mg) by mouth as needed (systolic bp > 160).      hypromellose (ARTIFICIAL TEARS) 0.4 % SOLN ophthalmic solution Apply 1 drop to eye every hour as needed for dry eyes    Associated Diagnoses: Liver replaced by transplant      levothyroxine (SYNTHROID/LEVOTHROID) 175 MCG tablet Take 1 tablet (175 mcg) by mouth daily.  Qty: 90 tablet, Refills: 3    Associated Diagnoses: Postoperative hypothyroidism      linagliptin (TRADJENTA) 5 MG TABS tablet Take 1 tablet (5 mg) by mouth daily  Qty: 90 tablet, Refills: 1    Associated Diagnoses: Type 2 diabetes mellitus with stage 3 chronic kidney disease, without long-term current use of insulin      meclizine (ANTIVERT) 25 MG tablet Take 1 tablet (25 mg) by mouth as needed for dizziness or other (migraines)  Qty: 30 tablet, Refills: 11    Associated Diagnoses: Dizziness      metoprolol succinate ER (TOPROL XL) 25 MG 24 hr tablet Take 25 mg by mouth daily.      Multiple Vitamins-Minerals (PRESERVISION AREDS 2) CAPS Take 1 capsule by mouth 2 times daily    Associated Diagnoses: Liver replaced by transplant      Nutrisource Fiber PO packet Take 1 packet by mouth daily.      omega-3 acid ethyl esters (LOVAZA) 1 g capsule Take 1 capsule by mouth 2 times daily  Qty: 180 capsule, Refills: 1    Associated Diagnoses: Hypertriglyceridemia      predniSONE (DELTASONE) 10 MG tablet Take 1 tablet (10 mg) by mouth daily.  Qty: 90 tablet, Refills: 3    Comments: TXP DT 5/22/2002 (Liver) TXP Dischg DT 6/3/2002 DX Liver replaced by transplant Z94.4 [facility] ([place], [place])  Associated Diagnoses: Liver replaced by transplant      sirolimus (GENERIC EQUIVALENT) 1 MG tablet Take 1 tablet (1 mg) by mouth daily.  Qty: 90 tablet, Refills: 3    Comments: TXP DT 5/22/2002 (Liver) TXP Dischg DT 6/3/2002 DX Liver replaced by transplant Z94.4 [facility]  ([place], [place]), dose change  Associated Diagnoses: Liver replaced by transplant      ursodiol (ACTIGALL) 250 MG tablet Take 1 tablet (250 mg) by mouth 2 times daily  Qty: 180 tablet, Refills: 3    Associated Diagnoses: Liver replaced by transplant           STOP taking these medications       sodium chloride 0.65 % nasal spray Comments:   Reason for Stopping:             Allergies   Allergies   Allergen Reactions    Fluconazole Hives and Itching     Full body hives      Mycophenolate Diarrhea and Nausea and Vomiting     Patient stated it was chronic and lasted months      Penicillins Rash, Anaphylaxis, Hives and Itching    Simvastatin Muscle Pain (Myalgia)     severe  Other reaction(s): Myalgia caused by statin    Methotrexate Other (See Comments)     Other reaction(s): Sore  Sores in mouth, esophagus, and stomach.       Morphine And Codeine Itching and Other (See Comments)     Psych disturbance  Other reaction(s): Confusion, Mood alteration    Quinolones Anxiety, Dizziness, Headache, Other (See Comments), Palpitations and Unknown     Other reaction(s): Hyperactive behavior, Lightheadedness, Mood alteration    Dizzy, light headed    Dizziness, shaky, and jumpy    Benadryl [Diphenhydramine Hcl]      Insomnia     Capsules, Empty Gelatin [Gelatin]     Cephalosporins Itching    Ciprofloxacin Hcl Dizziness and Other (See Comments)    Lansoprazole Diarrhea    Azithromycin Itching    Doxycycline Itching and Unknown    Lisinopril Cough    Omeprazole Itching    Tolectin [Nsaids] Rash    Tolmetin Rash and Itching    Tramadol Rash, Hives and Itching

## 2024-09-25 NOTE — PROGRESS NOTES
Patient discharged to home with daughter. Both verbalize understanding of discharge teaching. Patient has all personal belongings

## 2024-09-25 NOTE — PROGRESS NOTES
[facility]  Infectious Disease Progress Note          Assessment and Plan:   Date of Admission:  9/22/2024  Date of Consult: 09/22/24     Assessment:  74YF with hx Sjogren's syndrome, of liver transplantation in 2002 for primary biliary cirrhosis -on immunosuppressive therapy with prednisone and sirolimus, CKD with recurrent urinary tract infections with MDRO including ESBL producers, who has been hospitalized due to nausea, vomiting, urinary frequency and weakness, was febrile and hypotensive , and has been admitted with sepsis likely of urinary source     -Septic shock  -Likely UTI  -DERREK superimposed on CKA, hypokalemia  -s/p liver transplantation, on immunosuppressive therapy with prednisone and sirolimus  -Multiple antibiotic allergies -PCN, cephalosporins, Azithromycin, Doxycycline, Quinolones  -Diabetes  -Chronic medical illnesses -atrial fibrillation, lymphedema, HTN, hyperlipidemia, mitral regurgitation, CVA     Recommendations:  ESBL organism is quinolone sensitive, but discussed her allergies in detail, Cipro more of a side effect issue than allergy but she has been unable to take it thus really no option here except for an IV course  Continue Meropenem plan was to go to ertapenem but now late growth of the second blood culture organism Pseudomonas, sensitivities not back but presumptively meropenem sensitive she is improved on that.  Discussed in detail,  has a history of recurrent UTIs largely ESBL, most of them being treated in [place] has an ID doctor there, it looks like urology here he referred her to ID at the  but now that we have seen her probably us to follow.  Needs a primary physician to be the initial screener for UTIs etc. not the role for us  Looks like no home IV coverage, option obviously would be to either self-pay or come into the infusion center for infusions with the need for twice daily meropenem not an option to come into infusion center and she wants to do  it at home self-pay anyway  Okay disposition, ordered a midline, meropenem twice daily for another 7 days, I will follow-up on the final Pseudomonas sensitivity of some note the Pseudomonas is not in the urine but little to suggest an alternative source to bacteremia and she is clinically improved with no other local symptoms so simply treat with antibiotics  If bacteremia or sepsis relapses after stopping antibiotics among other things certainly needs abdominal imaging        Interval History:     no new complaints and doing well; no cp, sob, n/v/d, or abd pain.  Feels significantly better, long discussion with the patient about her allergies, prior UTIs, IV antibiotics etc. urine culture is the same ESBL organism but Pseudomonas now in the blood culture and was not in the urine, nothing else for obvious deeper infection              Medications:     Current Facility-Administered Medications   Medication Dose Route Frequency Provider Last Rate Last Admin    apixaban ANTICOAGULANT (ELIQUIS) tablet 2.5 mg  2.5 mg Oral BID Herman Barahona MD   2.5 mg at 09/25/24 0908    calcitRIOL (ROCALTROL) capsule 0.25 mcg  0.25 mcg Oral Daily Herman Barahona MD   0.25 mcg at 09/25/24 0908    estradiol (ESTRACE) cream 2 g  2 g Vaginal Every Other Day Herman Barahona MD   2 g at 09/25/24 0907    evolocumab (REPATHA) injection 140 mg  140 mg Subcutaneous Q14 Days Herman Barahona MD   140 mg at 09/23/24 1404    ezetimibe (ZETIA) tablet 10 mg  10 mg Oral Daily Herman Barahona MD   10 mg at 09/24/24 0900    fish oil-omega-3 fatty acids capsule 1 g  1 g Oral BID Herman Barahona MD   1 g at 09/25/24 0909    folic acid (FOLVITE) tablet 1 mg  1 mg Oral Daily Herman Barahona MD   1 mg at 09/25/24 0908    furosemide (LASIX) tablet 20 mg  20 mg Oral Daily Herman Barahona MD   20 mg at 09/25/24 0908    gabapentin (NEURONTIN) solution 300 mg  300 mg Oral At Bedtime Herman Barahona MD   300 mg at 09/24/24 2120    insulin aspart (NovoLOG)  "injection (RAPID ACTING)  1-7 Units Subcutaneous TID AC Herman Barahona MD   2 Units at 09/23/24 1741    insulin aspart (NovoLOG) injection (RAPID ACTING)  1-5 Units Subcutaneous At Bedtime Herman Barahona MD   1 Units at 09/22/24 2240    levothyroxine (SYNTHROID/LEVOTHROID) tablet 175 mcg  175 mcg Oral Daily Herman Barahona MD   175 mcg at 09/25/24 0909    meropenem (MERREM) 1 g vial to attach to  mL bag  1 g Intravenous Q12H Jevon Johnson MD        metoprolol succinate ER (TOPROL XL) 24 hr tablet 25 mg  25 mg Oral Daily Herman Barahona MD   25 mg at 09/25/24 0908    predniSONE (DELTASONE) tablet 10 mg  10 mg Oral Daily Herman Barahona MD   10 mg at 09/25/24 0908    sirolimus (GENERIC EQUIVALENT) tablet 1 mg  1 mg Oral Daily Herman Barahona MD   1 mg at 09/25/24 0909    sitagliptin (JANUVIA) tablet 50 mg  50 mg Oral Daily Herman Barahona MD   50 mg at 09/25/24 0908    sodium chloride (PF) 0.9% PF flush 10 mL  10 mL Intracatheter Q8H Jevon Johnson MD        sodium chloride (PF) 0.9% PF flush 3 mL  3 mL Intracatheter Q8H Herman Barahona MD   3 mL at 09/25/24 0307    ursodiol (ACTIGALL) tablet 250 mg  250 mg Oral BID Herman Barahona MD   250 mg at 09/25/24 0908                  Physical Exam:   Blood pressure (!) 143/69, pulse 66, temperature 98.6  F (37  C), resp. rate 16, height 1.702 m (5' 7\"), weight 79.9 kg (176 lb 2.4 oz), last menstrual period 06/01/1988, SpO2 96%, not currently breastfeeding.  Wt Readings from Last 2 Encounters:   09/25/24 79.9 kg (176 lb 2.4 oz)   09/09/24 78.5 kg (173 lb)     Vital Signs with Ranges  Temp:  [97.3  F (36.3  C)-98.6  F (37  C)] 98.6  F (37  C)  Pulse:  [62-68] 66  Resp:  [16-20] 16  BP: (131-149)/(63-78) 143/69  SpO2:  [90 %-97 %] 96 %    Constitutional: Awake, alert, cooperative, no apparent distress     Lungs: Clear to auscultation bilaterally, no crackles or wheezing   Cardiovascular: Regular rate and rhythm, normal S1 and S2, and no murmur noted   Abdomen: Normal " bowel sounds, soft, non-distended, non-tender   Skin: No rashes, no cyanosis, no edema   Other:           Data:   All microbiology laboratory data reviewed.  Recent Labs   Lab Test 09/25/24  0558 09/24/24  1048 09/23/24  0610   WBC 9.2 15.6* 17.2*   HGB 9.8* 11.0* 10.1*   HCT 31.6* 36.4 32.5*   MCV 96 97 98    313 284     Recent Labs   Lab Test 09/25/24  0558 09/24/24  1048 09/23/24  0610   CR 1.56* 1.46* 1.60*     Recent Labs   Lab Test 08/26/19  2331   SED 78*     Recent Labs   Lab Test 08/30/19  0657 08/29/19  0544 08/28/19  1718 08/28/19  1710 08/27/19  0224 08/27/19  0150 08/26/19  2331 09/27/18  1912 07/30/18  0852   CULT No growth No growth No growth No growth 10,000 to 50,000 colonies/mL  Escherichia coli  * Cultured on the 1st day of incubation:  Escherichia coli  Susceptibility testing done on previous specimen  *  Critical Value/Significant Value, preliminary result only, called to and read back by   Maria Teresa Martines RN 08/27/2019 @1425 /hdp   Cultured on the 1st day of incubation:  Escherichia coli  *  Critical Value/Significant Value, preliminary result only, called to and read back by  Maria Teresa Martines RN [facility] at 1229 [id number]    (Note)  POSITIVE for E.COLI by Verigene multiplex nucleic acid test. Final  identification and antimicrobial susceptibility testing will be  verified by standard methods. Verigene test will not distinguish  E.coli from Shigella species including S.dysenteriae, S.flexneri,  S.boydii, and S.sonnei. Specimens containing Shigella species or  E.coli will be reported as Positive for E.coli.    Specimen tested with Verigene multiplex, gram-negative blood culture  nucleic acid test for the following targets: Acinetobacter sp.,  Citrobacter sp., Enterobacter sp., Proteus sp., E. coli, K.  pneumoniae/oxytoca, P. aeruginosa, and the following resistance  markers: CTXM, KPC, NDM, VIM, IMP and OXA.    Critical Value/Significant Value called to and read back by  Flower Lujan Rn @ 0408  8.27.19 CS     Moderate growth  Staphylococcus aureus  * 50,000 to 100,000 colonies/mL  mixed urogenital louann  Susceptibility testing not routinely done

## 2024-09-25 NOTE — PROGRESS NOTES
New SPQ for Meropenem 1g Q12H came out to an estimated $43.84 per day for drug and supply costs.    For nursing, pt should have coverage if homebound, however [facility] is not contracted with Medicare and an outside nursing agency would be utilized instead. If pt is not homebound, there is no coverage and [facility] can provide nursing if pt agrees to self-pay an additional $90 per visit.     In reference to referral from  Surjit on pt admitted 09/22/24 to check IV ABX coverage.     Please contact Intake with any questions, [phone] or In Basket pool,  Home Infusion ([phone]).

## 2024-09-25 NOTE — PROCEDURES
[facility]    Single Lumen Midline Placement    Date/Time: 9/25/2024 1:50 PM    Performed by: Elvia Beasley RN  Authorized by: Jevon Johnson MD  Indications: vascular access      UNIVERSAL PROTOCOL   Site Marked: Yes  Prior Images Obtained and Reviewed:  Yes  Required items: Required blood products, implants, devices and special equipment available    Patient identity confirmed:  Verbally with patient, arm band, provided demographic data and hospital-assigned identification number  NA - No sedation, light sedation, or local anesthesia  Confirmation Checklist:  Patient's identity using two indicators, relevant allergies, procedure was appropriate and matched the consent or emergent situation and correct equipment/implants were available  Time out: Immediately prior to the procedure a time out was called    Universal Protocol: the Joint Commission Universal Protocol was followed    Preparation: Patient was prepped and draped in usual sterile fashion       ANESTHESIA    Anesthesia:  Local infiltration  Local Anesthetic:  Lidocaine 1% without epinephrine  Anesthetic Total (mL):  1      SEDATION    Patient Sedated: No        Preparation: skin prepped with ChloraPrep  Skin prep agent: skin prep agent completely dried prior to procedure  Sterile barriers: maximum sterile barriers were used: cap, mask, sterile gown, sterile gloves, and large sterile sheet  Hand hygiene: hand hygiene performed prior to central venous catheter insertion  Type of line used: Midline  Catheter type: single lumen  Lumen type: non-valved  Catheter size: 4 Fr  Brand: Bard  Lot number: [id number]  Placement method: ultrasound, MST and venipuncture  Number of attempts: 1  Difficulty threading catheter: no  Successful placement: yes  Orientation: right  Catheter to Vein (%): 13  Location: cephalic vein  Site rationale: pt preference  Arm circumference: adults 10 cm  Extremity circumference: 30  Visible catheter length:  2  Total catheter length: 18  Dressing and securement: additional securement method (see comment), alcohol impregnated caps, blood cleaned with CHG, dressing applied, securement device, site cleansed, subcutaneous anchor securement system, sterile dressing applied and transparent dressing  Post procedure assessment: blood return through all ports and free fluid flow  PROCEDURE   Patient Tolerance:  Patient tolerated the procedure well with no immediate complications   RUE cephalic vein midline inserted without difficulty. Midline tip is distal to axilla. Midline is ready for use. Bedside RN Elo updated, see flowsheet for additional details.         Dapsone Pregnancy And Lactation Text: This medication is Pregnancy Category C and is not considered safe during pregnancy or breast feeding.

## 2024-09-25 NOTE — PLAN OF CARE
"ICU End of Shift Summary.  For vital signs and complete assessments, please see documentation flowsheets.      Pertinent assessments: A&O. VSS. Lungs clear. Purewick in place, urine yellow. Denies urinary symptoms. Positioning self in bed.    Lines: piv    Plan (Upcoming Events): continue iv abx. Get midline/ home abx set up. Discharge this afternoon?    Problem: Adult Inpatient Plan of Care  Goal: Plan of Care Review  Description: The Plan of Care Review/Shift note should be completed every shift.  The Outcome Evaluation is a brief statement about your assessment that the patient is improving, declining, or no change.  This information will be displayed automatically on your shift  note.  Outcome: Progressing  Flowsheets (Taken 9/25/2024 0441)  Outcome Evaluation: VSS. continues on IV merrem. possible DC today after line placement and outpt abx coordinated.  Plan of Care Reviewed With: patient  Overall Patient Progress: improving  Goal: Patient-Specific Goal (Individualized)  Description: You can add care plan individualizations to a care plan. Examples of Individualization might be:  \"Parent requests to be called daily at 9am for status\", \"I have a hard time hearing out of my right ear\", or \"Do not touch me to wake me up as it startles  me\".  Outcome: Progressing  Flowsheets (Taken 9/25/2024 0441)  Individualized Care Needs: [place], speak loudly  Goal: Absence of Hospital-Acquired Illness or Injury  Outcome: Progressing  Intervention: Identify and Manage Fall Risk  Recent Flowsheet Documentation  Taken 9/25/2024 0305 by Roz Wyman RN  Safety Promotion/Fall Prevention:   activity supervised   assistive device/personal items within reach   clutter free environment maintained   increased rounding and observation   increase visualization of patient   lighting adjusted   nonskid shoes/slippers when out of bed   room near nurse's station   room door open   room organization consistent   safety round/check " Received Medicare forms from [address] [place] [address], placed on MD's desk for signature. completed  Taken 9/24/2024 2125 by Roz Wyman RN  Safety Promotion/Fall Prevention:   activity supervised   assistive device/personal items within reach   clutter free environment maintained   increased rounding and observation   increase visualization of patient   lighting adjusted   nonskid shoes/slippers when out of bed   room near nurse's station   room door open   room organization consistent   safety round/check completed  Intervention: Prevent Skin Injury  Recent Flowsheet Documentation  Taken 9/25/2024 0305 by Roz Wyman RN  Body Position:   heels elevated   position changed independently  Device Skin Pressure Protection:   tubing/devices free from skin contact   positioning supports utilized   adhesive use limited   absorbent pad utilized/changed  Taken 9/25/2024 0000 by Roz Wyman RN  Body Position: position changed independently  Taken 9/24/2024 2125 by Rzo Wyman RN  Body Position:   weight shifting   heels elevated  Device Skin Pressure Protection:   tubing/devices free from skin contact   positioning supports utilized   adhesive use limited   absorbent pad utilized/changed  Intervention: Prevent and Manage VTE (Venous Thromboembolism) Risk  Recent Flowsheet Documentation  Taken 9/25/2024 0305 by Roz Wyman RN  VTE Prevention/Management: (eliquis) SCDs off (sequential compression devices)  Taken 9/24/2024 2125 by Roz Wyman RN  VTE Prevention/Management: (eliquis) SCDs off (sequential compression devices)  Intervention: Prevent Infection  Recent Flowsheet Documentation  Taken 9/25/2024 0305 by Roz Wyman RN  Infection Prevention:   hand hygiene promoted   equipment surfaces disinfected   environmental surveillance performed   single patient room provided  Taken 9/24/2024 2125 by Roz Wyman RN  Infection Prevention:   hand hygiene promoted   equipment surfaces disinfected   environmental surveillance performed   single patient room  provided  Goal: Optimal Comfort and Wellbeing  Outcome: Progressing  Intervention: Provide Person-Centered Care  Recent Flowsheet Documentation  Taken 9/25/2024 0305 by Roz Wyman RN  Trust Relationship/Rapport:   care explained   choices provided   questions encouraged   reassurance provided   thoughts/feelings acknowledged  Taken 9/24/2024 2125 by Roz Wyman RN  Trust Relationship/Rapport:   care explained   choices provided   questions encouraged   reassurance provided   thoughts/feelings acknowledged  Goal: Readiness for Transition of Care  Outcome: Progressing  Flowsheets (Taken 9/25/2024 0441)  Anticipated Changes Related to Illness: none  Transportation Anticipated: family or friend will provide  Concerns to be Addressed: discharge planning  Barriers to Discharge: needs midline  Intervention: Mutually Develop Transition Plan  Recent Flowsheet Documentation  Taken 9/25/2024 0441 by Roz Wyman RN  Anticipated Changes Related to Illness: none  Transportation Anticipated: family or friend will provide  Concerns to be Addressed: discharge planning     Goal Outcome Evaluation:      Plan of Care Reviewed With: patient    Overall Patient Progress: improvingOverall Patient Progress: improving    Outcome Evaluation: VSS. continues on IV merrem. possible DC today after line placement and outpt abx coordinated.

## 2024-09-25 NOTE — PROGRESS NOTES
Care Management Discharge Note    Discharge Date: 09/25/2024       Discharge Disposition: Home Care, Home Infusion    Discharge Transportation: family or friend will provide    Private pay costs discussed:  IV supplies/medication    Does the patient's insurance plan have a 3 day qualifying hospital stay waiver?  No     Patient/family educated on Medicare website which has current facility and service quality ratings: yes    Education Provided on the Discharge Plan:  Yes  Persons Notified of Discharge Plans: Provider, I  Patient/Family in Agreement with the Plan: yes    Handoff Referral Completed: No, handoff not indicated or clinically appropriate    Additional Information:  Pt will be discharging home today with services through [facility], [facility], and [facility].   [facility] will be delivering supplies to pt's residence [facility] nurse will visit in the AM for first dose at home. [facility] will be providing the home PT/OT visits.   Contact information has been added to pt's AVS.  Please call if additional needs arise.    Atiya Griggs RN   Inpatient Care Coordination  [facility]   Phone: [phone]

## 2024-09-26 ENCOUNTER — TELEPHONE (OUTPATIENT)
Dept: PHARMACY | Facility: OTHER | Age: 74
End: 2024-09-26
Payer: MEDICARE

## 2024-09-26 ENCOUNTER — MEDICAL CORRESPONDENCE (OUTPATIENT)
Dept: HEALTH INFORMATION MANAGEMENT | Facility: CLINIC | Age: 74
End: 2024-09-26
Payer: MEDICARE

## 2024-09-26 NOTE — TELEPHONE ENCOUNTER
MTM referral from: Transitions of Care (recent hospital discharge or ED visit)    MTM referral outreach attempt #1 on September 26, 2024 at 11:03 AM      Outcome: Spoke with patient declined    Use no clinic listed, use [facility] 9/25, txp hx for the carrier/Plan on the flowsheet          Zina Marinelli [facility]  -[facility]  [phone]

## 2024-09-27 PROBLEM — J18.9 LUNG INFECTION: Status: ACTIVE | Noted: 2023-11-24

## 2024-09-27 LAB
BACTERIA BLD CULT: ABNORMAL

## 2024-09-28 ENCOUNTER — LAB REQUISITION (OUTPATIENT)
Dept: LAB | Facility: CLINIC | Age: 74
End: 2024-09-28
Payer: MEDICARE

## 2024-09-28 DIAGNOSIS — N39.0 URINARY TRACT INFECTION, SITE NOT SPECIFIED: ICD-10-CM

## 2024-09-28 LAB — HOLD SPECIMEN: NORMAL

## 2024-09-30 ENCOUNTER — LAB REQUISITION (OUTPATIENT)
Dept: LAB | Facility: CLINIC | Age: 74
End: 2024-09-30
Payer: MEDICARE

## 2024-09-30 DIAGNOSIS — Z16.12 EXTENDED SPECTRUM BETA LACTAMASE (ESBL) RESISTANCE: ICD-10-CM

## 2024-09-30 LAB
ACANTHOCYTES BLD QL SMEAR: SLIGHT
ALT SERPL W P-5'-P-CCNC: 26 U/L (ref 0–50)
AST SERPL W P-5'-P-CCNC: 21 U/L (ref 0–45)
BASOPHILS # BLD MANUAL: 0.1 10E3/UL (ref 0–0.2)
BASOPHILS NFR BLD MANUAL: 1 %
BURR CELLS BLD QL SMEAR: SLIGHT
CREAT SERPL-MCNC: 1.36 MG/DL (ref 0.51–0.95)
DACRYOCYTES BLD QL SMEAR: SLIGHT
EGFRCR SERPLBLD CKD-EPI 2021: 40 ML/MIN/1.73M2
ELLIPTOCYTES BLD QL SMEAR: SLIGHT
EOSINOPHIL # BLD MANUAL: 0.2 10E3/UL (ref 0–0.7)
EOSINOPHIL NFR BLD MANUAL: 3 %
ERYTHROCYTE [DISTWIDTH] IN BLOOD BY AUTOMATED COUNT: 14.9 % (ref 10–15)
FRAGMENTS BLD QL SMEAR: SLIGHT
HCT VFR BLD AUTO: 35 % (ref 35–47)
HGB BLD-MCNC: 11.1 G/DL (ref 11.7–15.7)
HOLD SPECIMEN: NORMAL
HOLD SPECIMEN: NORMAL
LYMPHOCYTES # BLD MANUAL: 2.7 10E3/UL (ref 0.8–5.3)
LYMPHOCYTES NFR BLD MANUAL: 33 %
MCH RBC QN AUTO: 29.6 PG (ref 26.5–33)
MCHC RBC AUTO-ENTMCNC: 31.7 G/DL (ref 31.5–36.5)
MCV RBC AUTO: 93 FL (ref 78–100)
METAMYELOCYTES # BLD MANUAL: 0.5 10E3/UL
METAMYELOCYTES NFR BLD MANUAL: 6 %
MONOCYTES # BLD MANUAL: 1 10E3/UL (ref 0–1.3)
MONOCYTES NFR BLD MANUAL: 12 %
NEUTROPHILS # BLD MANUAL: 3.7 10E3/UL (ref 1.6–8.3)
NEUTROPHILS NFR BLD MANUAL: 45 %
NRBC # BLD AUTO: 0 10E3/UL
NRBC BLD AUTO-RTO: 0 /100
PLAT MORPH BLD: ABNORMAL
PLATELET # BLD AUTO: 388 10E3/UL (ref 150–450)
RBC # BLD AUTO: 3.75 10E6/UL (ref 3.8–5.2)
RBC MORPH BLD: ABNORMAL
WBC # BLD AUTO: 8.2 10E3/UL (ref 4–11)

## 2024-09-30 PROCEDURE — 82565 ASSAY OF CREATININE: CPT | Performed by: INTERNAL MEDICINE

## 2024-09-30 PROCEDURE — 84450 TRANSFERASE (AST) (SGOT): CPT | Performed by: INTERNAL MEDICINE

## 2024-09-30 PROCEDURE — 87040 BLOOD CULTURE FOR BACTERIA: CPT | Performed by: INTERNAL MEDICINE

## 2024-09-30 PROCEDURE — 85007 BL SMEAR W/DIFF WBC COUNT: CPT | Performed by: INTERNAL MEDICINE

## 2024-09-30 PROCEDURE — 85014 HEMATOCRIT: CPT | Performed by: INTERNAL MEDICINE

## 2024-09-30 PROCEDURE — 84460 ALANINE AMINO (ALT) (SGPT): CPT | Performed by: INTERNAL MEDICINE

## 2024-10-01 ENCOUNTER — HOSPITAL ENCOUNTER (EMERGENCY)
Facility: CLINIC | Age: 74
Discharge: HOME | End: 2024-10-01
Attending: EMERGENCY MEDICINE | Admitting: EMERGENCY MEDICINE
Payer: MEDICARE

## 2024-10-01 VITALS
OXYGEN SATURATION: 100 % | HEART RATE: 71 BPM | HEIGHT: 67 IN | BODY MASS INDEX: 28.09 KG/M2 | DIASTOLIC BLOOD PRESSURE: 80 MMHG | WEIGHT: 179 LBS | RESPIRATION RATE: 20 BRPM | TEMPERATURE: 97.3 F | SYSTOLIC BLOOD PRESSURE: 194 MMHG

## 2024-10-01 DIAGNOSIS — Z87.898 HISTORY OF BACTEREMIA: ICD-10-CM

## 2024-10-01 DIAGNOSIS — Z78.9 PROBLEM WITH VASCULAR ACCESS: ICD-10-CM

## 2024-10-01 DIAGNOSIS — I10 HYPERTENSION, UNSPECIFIED TYPE: ICD-10-CM

## 2024-10-01 PROCEDURE — 96366 THER/PROPH/DIAG IV INF ADDON: CPT

## 2024-10-01 PROCEDURE — 99284 EMERGENCY DEPT VISIT MOD MDM: CPT | Mod: 25

## 2024-10-01 PROCEDURE — 250N000011 HC RX IP 250 OP 636: Performed by: EMERGENCY MEDICINE

## 2024-10-01 RX ORDER — MEROPENEM 1 G/1
1 INJECTION, POWDER, FOR SOLUTION INTRAVENOUS ONCE
Status: COMPLETED | OUTPATIENT
Start: 2024-10-01 | End: 2024-10-01

## 2024-10-01 RX ORDER — ERTAPENEM 1 G/1
1 INJECTION, POWDER, LYOPHILIZED, FOR SOLUTION INTRAMUSCULAR; INTRAVENOUS EVERY 24 HOURS
Status: DISCONTINUED | OUTPATIENT
Start: 2024-10-01 | End: 2024-10-01

## 2024-10-01 RX ADMIN — MEROPENEM 1 G: 1 INJECTION, POWDER, FOR SOLUTION INTRAVENOUS at 21:06

## 2024-10-01 ASSESSMENT — COLUMBIA-SUICIDE SEVERITY RATING SCALE - C-SSRS
2. HAVE YOU ACTUALLY HAD ANY THOUGHTS OF KILLING YOURSELF IN THE PAST MONTH?: NO
1. IN THE PAST MONTH, HAVE YOU WISHED YOU WERE DEAD OR WISHED YOU COULD GO TO SLEEP AND NOT WAKE UP?: NO
6. HAVE YOU EVER DONE ANYTHING, STARTED TO DO ANYTHING, OR PREPARED TO DO ANYTHING TO END YOUR LIFE?: NO

## 2024-10-01 ASSESSMENT — ACTIVITIES OF DAILY LIVING (ADL)
ADLS_ACUITY_SCORE: 38
ADLS_ACUITY_SCORE: 36
ADLS_ACUITY_SCORE: 38

## 2024-10-01 NOTE — ED PROVIDER NOTES
Emergency Department Note      History of Present Illness     Chief Complaint   Vascular Access Problem    HPI   Luz Thompson is a 74 year old left handed female with a history of anemia, stage 3 CKD, CVA, DVT, hyperlipidemia, hypertension, type 2 diabetes, thyroid cancer, and paroxysmal atrial fibrillation, anticoagulated on Apixaban who presents with her daughter to the Emergency Department for vascular access problem.  Patient was recently hospitalized for treatment of ESBL E. coli and subsequent bacteremia.  She did develop septic shock, requiring vasopressors and steroid administration before improvements.  She was ultimately discharged home with a midline IV to her right upper extremity for 7 additional days of meropenem.  She presents to the ER today as she noted that the midline appeared to be leaking underneath the bandage.  Patient notes that she has been slowly feeling improved since the time of discharge.  She denies any fevers, chills, nausea, or vomiting.  Of note, patient follows with infectious disease and had laboratory studies obtained yesterday including a blood culture.      Independent Historian   Daughter as detailed above.    Review of External Notes   I reviwed the hospital discharge note from 9/22/24 where the patient was hospitalized for septic shock secondary to a UTI. Upon discharge, she was recommended to continue with meropenem for 7 additional days.    Patient was found to have ESBL Klebsiella and Pseudomonas bacteremia, as well as a urine culture positive for ESBL Klebsiella.  Patient was seen in consultation with infectious disease, who felt that UTI was likely the source.  Antibiotic therapy was limited in part secondary to multiple drug allergies.    CBC from 9/30/2024 reveals WBC count of 8.2, continuing to trend downward from hospitalization.  Blood culture shows no growth to date.    Past Medical History     Medical History and Problem List   Anemia of chronic  "disease  Anxiety  CKD stage 3  CVA  DVT  Diverticulosis of sigmoid colon  EBV viremia  Glaucoma  Hyperlipidemia  Hypertension   Hypertriglyceridemia  Macular degeneration  Migraines  Osteoarthritis of right knee  Osteoporosis  Paroxysmal atrial fibrillation  Postablative hypothyroidism  Primary biliary cirrhosis   [facility] fever  Sjogren's syndrome  Thyroid cancer  Type 2 diabetes mellitus  Vitamin D deficiency    Medications   Apixaban ANTICOAGULANT   calcitRIOL   Evolocumab   Ezetimibe   Furosemide   Gabapentin   hydrALAZINE   Levothyroxine   Linagliptin   Meclizine  Meropenem   Metoprolol succinate ER  Omega-3 acid ethyl esters   Sirolimus  Ursodiol     Surgical History   Liver transplant  Appendectomy  Cataract IOL, RT/LT  Cholecystectomy  Cystoscopy  Ear drum repair  Right knee surgery  PICC insertion  Thyroidectomy    Physical Exam     Patient Vitals for the past 24 hrs:   BP Temp Pulse Resp SpO2 Height Weight   10/01/24 2130 (!) 194/80 -- 71 -- 100 % -- --   10/01/24 2110 (!) 191/81 -- 74 -- 99 % -- --   10/01/24 1556 (!) 142/113 97.3  F (36.3  C) 74 20 97 % 1.702 m (5' 7\") 81.2 kg (179 lb)     Physical Exam  General:              Well-nourished              Speaking in full sentences  Eyes:              Conjunctiva without injection or scleral icterus  Resp:              Lungs CTAB              No crackles, wheezing or audible rubs              Good air movement  CV:                    Normal rate, regular rhythm              S1 and S2 present              No murmur, gallop or rub  GI:              BS present              Abdomen soft without distention              Non-tender to light and deep palpation              No guarding or rebound tenderness  Skin:              Warm, dry, well perfused              No rashes or open wounds on exposed skin              Midline catheter in place to RUE              No surrounding erythema or expressible drainage  MSK:              Moves all extremities       "        No focal deformities or swelling  Neuro:              Alert              Answers questions appropriately              Moves all extremities equally              Gait stable  Psych:              Normal affect, normal mood    Diagnostics     Lab Results   Labs Ordered and Resulted from Time of ED Arrival to Time of ED Departure - No data to display    Imaging   No orders to display     EKG   None    Independent Interpretation   None    ED Course      Medications Administered   Medications   meropenem (MERREM) 1 g vial to attach to  mL bag (0 g Intravenous Stopped 10/1/24 2159)       Procedures   None    Discussion of Management   Dr. Johnson    ED Course   ED Course as of 10/02/24 0054   Tue Oct 01, 2024   1822 I evaluated the patient, obtained history, and performed a physical exam as detailed above.    1830 I rechecked the patient. After infusing a few CC's of fluid, there was leakage from where the midline is inserted into the skin.   2007 I rechecked the patient and updated them on the plan of care.    2020 I rechecked the patient and updated them on the plan of care. The patient declined Ertapenem, but was ok with Meropenem.   2020 I spoke with Dr. Johnson.   2058 I rechecked the patient and updated them on the plan of care.    2154 I rechecked the patient and updated them on the plan of care. The patient denied medications for her blood pressure.     Additional Documentation  None    Medical Decision Making / Diagnosis   MIPS       None    MDM   Luz Thompson is a 74 year old female with a complex past medical history significant for recent UTI with resultant septic shock, prior liver transplantation, chronic anticoagulation, presenting to the ED accompanied by daughter with concerns for a vascular access problem.  On exam, patient is noted to have leaking saline from the midline catheter beneath the bandage after instillation of approximately 1 cc of normal saline.  We initially made  arrangements with vascular access team for replacement of a midline catheter to complete 2 additional days of antibiotic therapy.  As patient's ED course progressed, she inquired as to the possibility of simply discontinuing further antibiotic therapy.  I did subsequently review her case with Dr. Donohue from infectious disease, who is very familiar with patient from her recent hospital admission.  He noted that patient could be discontinued from her antibiotic therapy and that a new midline catheter would not need to be placed.  He did recommend administration of 1 dose of ertapenem through a peripheral IV while in the ED prior to discharge home.  On discussion of this with the patient, she is quite hesitant to try or ertapenem given her multiple drug allergies get was open to a dose of meropenem.  This was administered.  I discussed my low concern for adverse reaction as she has tolerated meropenem without difficulty, yet patient remains adamant against ertapenem at this time.  Fortunately, her clinical status does appear to be improving following her hospital course.  Her laboratory studies are reviewed from yesterday, including a blood culture which shows no growth to date.  I do feel repeat labs can be deferred safely at this time.  During patient's ED course, I did note that her blood pressure was elevated.  I did offer intervention with her home medications, though at this point, she is adamant about leaving the ED.  She will take her medications upon returning home, and will monitor her blood pressure closely.  She is certainly welcome to return to the ED in the meantime with any new or troubling symptoms including development of fevers, chills, nausea, vomiting, generalized weakness or any other concerns.  All of her questions have been answered prior to discharge.    Disposition   The patient was discharged.     Diagnosis     ICD-10-CM    1. Problem with vascular access  Z78.9       2. History of bacteremia   Z87.898       3. Hypertension, unspecified type  I10            Scribe Disclosure:  I, Gayatri Souza, am serving as a scribe at 8:40 PM on 10/1/2024 to document services personally performed by Tomasz Hager MD based on my observations and the provider's statements to me.        Tomasz Hager MD  10/02/24 0058

## 2024-10-01 NOTE — ED TRIAGE NOTES
Pt arrives for midline issue. She states she was trying to do an infusion at home today and noticed the line was leaking and there was a puddle on the chair under her arm. She is not sure where the leak is coming from in the line. Has midline for UTI/sepsis and gets meropenem infusions. AVSS on RA.

## 2024-10-02 NOTE — DISCHARGE INSTRUCTIONS
Please continue to follow-up closely with your primary team and for ongoing monitoring of your blood cultures obtained yesterday.    Please seek immediate reevaluation if you develop any new or troubling symptoms such as increased fatigue, weakness, fevers, chills or any other concerns.  We are more than happy to reassess    Take your blood pressure medications upon returning home    Monitor your blood pressure closely    Return immediately with any other concerns.

## 2024-10-02 NOTE — PROGRESS NOTES
VAT consult received for leaking midline. Pt met in FT and was not roomed at the time VAT arrived. VAT did not assess pt for this reason, and per ED RN she stated she flushed midline and it was leaking directly at site. VAT recommends RUE US to r/o thrombus d/t leaking and will need midline replaced by PICC stat as VAT unable to do this evening. ED RN will communicate this with MD.

## 2024-10-03 ENCOUNTER — TELEPHONE (OUTPATIENT)
Dept: CARDIOLOGY | Facility: CLINIC | Age: 74
End: 2024-10-03
Payer: MEDICARE

## 2024-10-03 DIAGNOSIS — Z86.73 H/O: CVA (CEREBROVASCULAR ACCIDENT): Primary | ICD-10-CM

## 2024-10-03 DIAGNOSIS — I10 HYPERTENSION GOAL BP (BLOOD PRESSURE) < 140/80: ICD-10-CM

## 2024-10-03 DIAGNOSIS — I48.0 PAROXYSMAL ATRIAL FIBRILLATION (H): ICD-10-CM

## 2024-10-03 RX ORDER — METOPROLOL SUCCINATE 25 MG/1
25 TABLET, EXTENDED RELEASE ORAL DAILY
Qty: 135 TABLET | Refills: 3 | Status: SHIPPED | OUTPATIENT
Start: 2024-10-03

## 2024-10-03 NOTE — TELEPHONE ENCOUNTER
"Patient left a voicemail saying she is experiencing some breakthrough afib and wonders about a plan. She says she is going to take another of metoprolol now.     Called patient back, she takes daily metoprolol XL 25mg daily but did not end up taking a second tablet since she only took her daily metoprolol an hour ago and wanted to give it more time. She denies any symptoms at this time, but she notes when her HR was in the 150's earlier today it felt like \"there was an elephant on her chest.\" Her HR at the time of the all was 102, /109. No missed doses of eliquis. She recently discharged 9/25 after an admission for sepsis.     She states she absolutely does not want to go to the ED. She would like more concrete directions for when she can take an extra tablet of metoprolol. DOD Dr Morgan messaged to review.   "

## 2024-10-03 NOTE — TELEPHONE ENCOUNTER
Kirill Zuluaga MD  You7 minutes ago (3:46 PM)     Yes take second dose. How long did she feel the elephant?       Called patient back, said the chest heaviness lasted 10-15min. She will take another metoprolol now. Dr Zuluaga updated.       Addendum:     Kirill Zuluaga MD  You13 minutes ago (4:03 PM)     She can take a second dose immediately repeat in two hours if still >100. 10-15 mins is OK       Updated prescription sent to pharmacy. Called patient and left a detailed message with instructions. Callback requested.     Take metoprolol succinate 25mg daily. May take an extra tablet as needed for breakthrough afib. May repeat after 2 hours if HR >100 (no more than 3 tablets/day).

## 2024-10-04 ENCOUNTER — MYC MEDICAL ADVICE (OUTPATIENT)
Dept: CARDIOLOGY | Facility: CLINIC | Age: 74
End: 2024-10-04
Payer: MEDICARE

## 2024-10-04 DIAGNOSIS — I10 BENIGN ESSENTIAL HYPERTENSION: ICD-10-CM

## 2024-10-04 DIAGNOSIS — E78.5 HYPERLIPIDEMIA LDL GOAL <70: ICD-10-CM

## 2024-10-04 RX ORDER — EZETIMIBE 10 MG/1
10 TABLET ORAL DAILY
Qty: 90 TABLET | Refills: 3 | Status: SHIPPED | OUTPATIENT
Start: 2024-10-04

## 2024-10-04 NOTE — PROGRESS NOTES
09/24/24: NO COVERAGE FOR IV ABX THROUGH PT'S MEDICARE/[employer] SUPPLEMENTAL. PT MUST AGREE TO SELF-PAY FOR COVERAGE. IF CADD DISPENSED, RETURN TO INTAKE FOR ABN. IF [facility] ONLY, RETURN TO INTAKE TO DO ABN AND/OR SELF PAY QUOTE    NO COVERAGE FOR TPA, LINE CARE, OR HYDRATION    I CANNOT BILL FOR NURSING IF PT IS HOMEBOUND, IF NOT WE CAN PROVIDE NURSING IF PT AGREES TO SELF-PAY FOR COVERAGE [facility]

## 2024-10-04 NOTE — TELEPHONE ENCOUNTER
"Patient sent a [employer] message asking for a refill of zetia. Rx sent as requested. Called patient to confirm that she received the voicemail from yesterday regarding her metoprolol, says she has been very busy today with other appointments. She says she did take the second tablet of metoprolol yesterday as advised and that did convert her back to normal rhythm. Reviewed updated instructions for metoprolol again and she expressed understanding.     Patient states she remains concerned that her blood pressure concerns where not addressed yesterday, notes her previous history of a stroke. Discussed that BP can vary as a response to afib, want to be cautious not to over-treat. Patient responded \"This isn't my first rodeo.\" Asked patient if she has re-checked her blood pressure since yesterday and she said no but she did take it at the time of the call and it was /75, HR 82. She has a prescription for PRN hydralazine 25mg, take 1 tab for SBP >160. She states she is not comfortable with her BP being this high. She did state that oftentimes her BP will be high at home and the \"perfect\" when she's in clinic. She wonders if she needs a visit to follow up on this or what Dr Zuluaga would suggest. Of note she is currently receiving homecare and is not able to drive to appointments. Dr Zuluaga messaged.   "

## 2024-10-04 NOTE — TELEPHONE ENCOUNTER
"Kirill Zuluaga MD  You22 minutes ago (4:41 PM)     Start losartan 25 mg daily.  Check BMP in 2 weeks.  She can report her blood pressures back at that time.       Called patient to review recommendations. She is unhappy with this recommendation and states she doesn't understand why Dr Zuluaga wouldn't increase the hydralazine or make it daily as opposed to adding a new med. She is very uncomfortable starting new meds as she lives alone. She thinks she tried losartan in the past and had a cough. Reviewed that lisinopril is listed as an allergy in her chart due to cough, but this does not occur with ARBs. Patient says she is \"not trying to be obtuse,\" but she wants a different recommendation. Routed back to provider.   "

## 2024-10-05 LAB — BACTERIA BLD CULT: NO GROWTH

## 2024-10-07 ENCOUNTER — TELEPHONE (OUTPATIENT)
Dept: CARDIOLOGY | Facility: CLINIC | Age: 74
End: 2024-10-07
Payer: MEDICARE

## 2024-10-07 DIAGNOSIS — R39.89 SUSPECTED UTI: Primary | ICD-10-CM

## 2024-10-08 NOTE — TELEPHONE ENCOUNTER
Kirill Zuluaga MD Hiljus, Audrey G, RN14 hours ago (8:55 PM)     I received 2 separate messages on this patient.  As stated previously losartan is a better choice for her heart, kidneys and A-fib.  If she wants hydralazine 10 mg 3 times daily.     ---------------------------------------------------------------------------------------    Patient called and VM left asking to return call to team 2 nurse line to discuss above message and recommendation from Dr. Zuluaga.

## 2024-10-09 RX ORDER — LOSARTAN POTASSIUM 25 MG/1
25 TABLET ORAL DAILY
Qty: 30 TABLET | Refills: 3 | Status: SHIPPED | OUTPATIENT
Start: 2024-10-09

## 2024-10-09 NOTE — TELEPHONE ENCOUNTER
Spoke with patient. Discussed the choices of hydralazine 10mg TID or losartan 25mg daily. Patient would like to try the losartan. Rx escripted. #30 days ordered as if this works patient will ask for the next refill to be sent to her mail order pharmacy.    Patient is arranging for records to be faxed from [facility] in [place], Dr. Luigi Philippe. Gave patient the fax.    Discussed transitioning to another cardiologist due to Dr. Zuluaga's USP. She is going to do some research on the website and think about which provider she would like.

## 2024-10-11 ENCOUNTER — HOSPITAL ENCOUNTER (INPATIENT)
Facility: CLINIC | Age: 74
LOS: 4 days | Discharge: HOME HEALTH | DRG: 690 | End: 2024-10-15
Attending: STUDENT IN AN ORGANIZED HEALTH CARE EDUCATION/TRAINING PROGRAM | Admitting: INTERNAL MEDICINE
Payer: MEDICARE

## 2024-10-11 DIAGNOSIS — Z94.4 LIVER REPLACED BY TRANSPLANT (H): ICD-10-CM

## 2024-10-11 DIAGNOSIS — D84.9 IMMUNOSUPPRESSION (H): ICD-10-CM

## 2024-10-11 DIAGNOSIS — N17.9 AKI (ACUTE KIDNEY INJURY) (H): ICD-10-CM

## 2024-10-11 DIAGNOSIS — Z00.6 CLINICAL TRIAL PARTICIPANT: ICD-10-CM

## 2024-10-11 DIAGNOSIS — Z16.12 ESBL (EXTENDED SPECTRUM BETA-LACTAMASE) PRODUCING BACTERIA INFECTION: ICD-10-CM

## 2024-10-11 DIAGNOSIS — Z22.358 ESBL ESCHERICHIA COLI CARRIER: ICD-10-CM

## 2024-10-11 DIAGNOSIS — N30.00 ACUTE CYSTITIS WITHOUT HEMATURIA: Primary | ICD-10-CM

## 2024-10-11 DIAGNOSIS — A49.9 ESBL (EXTENDED SPECTRUM BETA-LACTAMASE) PRODUCING BACTERIA INFECTION: ICD-10-CM

## 2024-10-11 DIAGNOSIS — N39.0 URINARY TRACT INFECTION WITHOUT HEMATURIA, SITE UNSPECIFIED: ICD-10-CM

## 2024-10-11 LAB
ALBUMIN UR-MCNC: 300 MG/DL
ANION GAP SERPL CALCULATED.3IONS-SCNC: 16 MMOL/L (ref 7–15)
APPEARANCE UR: ABNORMAL
BACTERIA #/AREA URNS HPF: ABNORMAL /HPF
BASOPHILS # BLD AUTO: 0.1 10E3/UL (ref 0–0.2)
BASOPHILS NFR BLD AUTO: 0 %
BILIRUB UR QL STRIP: NEGATIVE
BUN SERPL-MCNC: 32.7 MG/DL (ref 8–23)
CALCIUM SERPL-MCNC: 8.8 MG/DL (ref 8.8–10.4)
CHLORIDE SERPL-SCNC: 104 MMOL/L (ref 98–107)
COLOR UR AUTO: ABNORMAL
CREAT SERPL-MCNC: 1.83 MG/DL (ref 0.51–0.95)
EGFRCR SERPLBLD CKD-EPI 2021: 28 ML/MIN/1.73M2
EOSINOPHIL # BLD AUTO: 0.1 10E3/UL (ref 0–0.7)
EOSINOPHIL NFR BLD AUTO: 1 %
ERYTHROCYTE [DISTWIDTH] IN BLOOD BY AUTOMATED COUNT: 14.6 % (ref 10–15)
GLUCOSE SERPL-MCNC: 126 MG/DL (ref 70–99)
GLUCOSE UR STRIP-MCNC: NEGATIVE MG/DL
HCO3 BLDV-SCNC: 27 MMOL/L (ref 21–28)
HCO3 SERPL-SCNC: 23 MMOL/L (ref 22–29)
HCT VFR BLD AUTO: 37.2 % (ref 35–47)
HGB BLD-MCNC: 12 G/DL (ref 11.7–15.7)
HGB UR QL STRIP: ABNORMAL
IMM GRANULOCYTES # BLD: 0.1 10E3/UL
IMM GRANULOCYTES NFR BLD: 1 %
KETONES UR STRIP-MCNC: NEGATIVE MG/DL
LACTATE BLD-SCNC: 0.8 MMOL/L
LEUKOCYTE ESTERASE UR QL STRIP: ABNORMAL
LYMPHOCYTES # BLD AUTO: 1.4 10E3/UL (ref 0.8–5.3)
LYMPHOCYTES NFR BLD AUTO: 11 %
MCH RBC QN AUTO: 30 PG (ref 26.5–33)
MCHC RBC AUTO-ENTMCNC: 32.3 G/DL (ref 31.5–36.5)
MCV RBC AUTO: 93 FL (ref 78–100)
MONOCYTES # BLD AUTO: 1.3 10E3/UL (ref 0–1.3)
MONOCYTES NFR BLD AUTO: 10 %
NEUTROPHILS # BLD AUTO: 9.8 10E3/UL (ref 1.6–8.3)
NEUTROPHILS NFR BLD AUTO: 76 %
NITRATE UR QL: NEGATIVE
NRBC # BLD AUTO: 0 10E3/UL
NRBC BLD AUTO-RTO: 0 /100
PCO2 BLDV: 41 MM HG (ref 40–50)
PH BLDV: 7.42 [PH] (ref 7.32–7.43)
PH UR STRIP: 6 [PH] (ref 5–7)
PLATELET # BLD AUTO: 360 10E3/UL (ref 150–450)
PO2 BLDV: 29 MM HG (ref 25–47)
POTASSIUM SERPL-SCNC: 4.1 MMOL/L (ref 3.4–5.3)
RBC # BLD AUTO: 4 10E6/UL (ref 3.8–5.2)
RBC URINE: 119 /HPF
SAO2 % BLDV: 57 % (ref 70–75)
SODIUM SERPL-SCNC: 143 MMOL/L (ref 135–145)
SP GR UR STRIP: 1.02 (ref 1–1.03)
SQUAMOUS EPITHELIAL: 17 /HPF
UROBILINOGEN UR STRIP-MCNC: NORMAL MG/DL
WBC # BLD AUTO: 12.8 10E3/UL (ref 4–11)
WBC URINE: >182 /HPF

## 2024-10-11 PROCEDURE — 99223 1ST HOSP IP/OBS HIGH 75: CPT | Mod: AI | Performed by: INTERNAL MEDICINE

## 2024-10-11 PROCEDURE — 81001 URINALYSIS AUTO W/SCOPE: CPT | Performed by: EMERGENCY MEDICINE

## 2024-10-11 PROCEDURE — 250N000011 HC RX IP 250 OP 636: Performed by: STUDENT IN AN ORGANIZED HEALTH CARE EDUCATION/TRAINING PROGRAM

## 2024-10-11 PROCEDURE — 250N000013 HC RX MED GY IP 250 OP 250 PS 637: Performed by: INTERNAL MEDICINE

## 2024-10-11 PROCEDURE — 87040 BLOOD CULTURE FOR BACTERIA: CPT | Performed by: STUDENT IN AN ORGANIZED HEALTH CARE EDUCATION/TRAINING PROGRAM

## 2024-10-11 PROCEDURE — 82803 BLOOD GASES ANY COMBINATION: CPT

## 2024-10-11 PROCEDURE — 80048 BASIC METABOLIC PNL TOTAL CA: CPT | Performed by: EMERGENCY MEDICINE

## 2024-10-11 PROCEDURE — 36415 COLL VENOUS BLD VENIPUNCTURE: CPT | Performed by: EMERGENCY MEDICINE

## 2024-10-11 PROCEDURE — 87086 URINE CULTURE/COLONY COUNT: CPT | Performed by: EMERGENCY MEDICINE

## 2024-10-11 PROCEDURE — 99291 CRITICAL CARE FIRST HOUR: CPT | Mod: 25

## 2024-10-11 PROCEDURE — 36415 COLL VENOUS BLD VENIPUNCTURE: CPT | Performed by: STUDENT IN AN ORGANIZED HEALTH CARE EDUCATION/TRAINING PROGRAM

## 2024-10-11 PROCEDURE — 120N000001 HC R&B MED SURG/OB

## 2024-10-11 PROCEDURE — 85025 COMPLETE CBC W/AUTO DIFF WBC: CPT | Performed by: EMERGENCY MEDICINE

## 2024-10-11 RX ORDER — HYDRALAZINE HYDROCHLORIDE 10 MG/1
10 TABLET, FILM COATED ORAL EVERY 8 HOURS SCHEDULED
Status: DISCONTINUED | OUTPATIENT
Start: 2024-10-11 | End: 2024-10-14

## 2024-10-11 RX ORDER — MEROPENEM 1 G/1
1 INJECTION, POWDER, FOR SOLUTION INTRAVENOUS ONCE
Status: COMPLETED | OUTPATIENT
Start: 2024-10-11 | End: 2024-10-11

## 2024-10-11 RX ADMIN — MEROPENEM 1 G: 1 INJECTION, POWDER, FOR SOLUTION INTRAVENOUS at 23:14

## 2024-10-11 RX ADMIN — HYDRALAZINE HYDROCHLORIDE 10 MG: 10 TABLET ORAL at 23:24

## 2024-10-11 ASSESSMENT — COLUMBIA-SUICIDE SEVERITY RATING SCALE - C-SSRS
1. IN THE PAST MONTH, HAVE YOU WISHED YOU WERE DEAD OR WISHED YOU COULD GO TO SLEEP AND NOT WAKE UP?: NO
6. HAVE YOU EVER DONE ANYTHING, STARTED TO DO ANYTHING, OR PREPARED TO DO ANYTHING TO END YOUR LIFE?: NO
2. HAVE YOU ACTUALLY HAD ANY THOUGHTS OF KILLING YOURSELF IN THE PAST MONTH?: NO

## 2024-10-11 ASSESSMENT — ACTIVITIES OF DAILY LIVING (ADL)
ADLS_ACUITY_SCORE: 38
ADLS_ACUITY_SCORE: 38

## 2024-10-12 LAB
ANION GAP SERPL CALCULATED.3IONS-SCNC: 14 MMOL/L (ref 7–15)
BUN SERPL-MCNC: 32.3 MG/DL (ref 8–23)
CALCIUM SERPL-MCNC: 8.3 MG/DL (ref 8.8–10.4)
CHLORIDE SERPL-SCNC: 105 MMOL/L (ref 98–107)
CREAT SERPL-MCNC: 1.7 MG/DL (ref 0.51–0.95)
EGFRCR SERPLBLD CKD-EPI 2021: 31 ML/MIN/1.73M2
ERYTHROCYTE [DISTWIDTH] IN BLOOD BY AUTOMATED COUNT: 14.8 % (ref 10–15)
GLUCOSE BLDC GLUCOMTR-MCNC: 125 MG/DL (ref 70–99)
GLUCOSE BLDC GLUCOMTR-MCNC: 163 MG/DL (ref 70–99)
GLUCOSE BLDC GLUCOMTR-MCNC: 168 MG/DL (ref 70–99)
GLUCOSE BLDC GLUCOMTR-MCNC: 169 MG/DL (ref 70–99)
GLUCOSE BLDC GLUCOMTR-MCNC: 177 MG/DL (ref 70–99)
GLUCOSE SERPL-MCNC: 180 MG/DL (ref 70–99)
HCO3 SERPL-SCNC: 22 MMOL/L (ref 22–29)
HCT VFR BLD AUTO: 32.6 % (ref 35–47)
HGB BLD-MCNC: 10.4 G/DL (ref 11.7–15.7)
MCH RBC QN AUTO: 29.8 PG (ref 26.5–33)
MCHC RBC AUTO-ENTMCNC: 31.9 G/DL (ref 31.5–36.5)
MCV RBC AUTO: 93 FL (ref 78–100)
PLATELET # BLD AUTO: 307 10E3/UL (ref 150–450)
POTASSIUM SERPL-SCNC: 4.3 MMOL/L (ref 3.4–5.3)
RBC # BLD AUTO: 3.49 10E6/UL (ref 3.8–5.2)
SODIUM SERPL-SCNC: 141 MMOL/L (ref 135–145)
WBC # BLD AUTO: 7.9 10E3/UL (ref 4–11)

## 2024-10-12 PROCEDURE — 99232 SBSQ HOSP IP/OBS MODERATE 35: CPT | Performed by: STUDENT IN AN ORGANIZED HEALTH CARE EDUCATION/TRAINING PROGRAM

## 2024-10-12 PROCEDURE — 120N000001 HC R&B MED SURG/OB

## 2024-10-12 PROCEDURE — 85014 HEMATOCRIT: CPT | Performed by: INTERNAL MEDICINE

## 2024-10-12 PROCEDURE — 36415 COLL VENOUS BLD VENIPUNCTURE: CPT | Performed by: INTERNAL MEDICINE

## 2024-10-12 PROCEDURE — 250N000013 HC RX MED GY IP 250 OP 250 PS 637: Performed by: INTERNAL MEDICINE

## 2024-10-12 PROCEDURE — 250N000011 HC RX IP 250 OP 636: Performed by: INTERNAL MEDICINE

## 2024-10-12 PROCEDURE — 250N000012 HC RX MED GY IP 250 OP 636 PS 637: Performed by: STUDENT IN AN ORGANIZED HEALTH CARE EDUCATION/TRAINING PROGRAM

## 2024-10-12 PROCEDURE — 250N000013 HC RX MED GY IP 250 OP 250 PS 637: Performed by: STUDENT IN AN ORGANIZED HEALTH CARE EDUCATION/TRAINING PROGRAM

## 2024-10-12 PROCEDURE — 250N000012 HC RX MED GY IP 250 OP 636 PS 637: Performed by: INTERNAL MEDICINE

## 2024-10-12 PROCEDURE — 80048 BASIC METABOLIC PNL TOTAL CA: CPT | Performed by: INTERNAL MEDICINE

## 2024-10-12 RX ORDER — EZETIMIBE 10 MG/1
10 TABLET ORAL DAILY
Status: DISCONTINUED | OUTPATIENT
Start: 2024-10-12 | End: 2024-10-15 | Stop reason: HOSPADM

## 2024-10-12 RX ORDER — MEROPENEM 1 G/1
1 INJECTION, POWDER, FOR SOLUTION INTRAVENOUS EVERY 12 HOURS
Status: DISCONTINUED | OUTPATIENT
Start: 2024-10-12 | End: 2024-10-15 | Stop reason: HOSPADM

## 2024-10-12 RX ORDER — CALCIUM CARBONATE 500 MG/1
1000 TABLET, CHEWABLE ORAL 4 TIMES DAILY PRN
Status: DISCONTINUED | OUTPATIENT
Start: 2024-10-12 | End: 2024-10-15 | Stop reason: HOSPADM

## 2024-10-12 RX ORDER — GABAPENTIN 250 MG/5ML
300 SOLUTION ORAL AT BEDTIME
Status: DISCONTINUED | OUTPATIENT
Start: 2024-10-12 | End: 2024-10-15 | Stop reason: HOSPADM

## 2024-10-12 RX ORDER — METOPROLOL SUCCINATE 25 MG/1
25 TABLET, EXTENDED RELEASE ORAL DAILY
Status: DISCONTINUED | OUTPATIENT
Start: 2024-10-12 | End: 2024-10-15 | Stop reason: HOSPADM

## 2024-10-12 RX ORDER — PROCHLORPERAZINE 25 MG
12.5 SUPPOSITORY, RECTAL RECTAL EVERY 12 HOURS PRN
Status: DISCONTINUED | OUTPATIENT
Start: 2024-10-12 | End: 2024-10-15 | Stop reason: HOSPADM

## 2024-10-12 RX ORDER — ONDANSETRON 4 MG/1
4 TABLET, ORALLY DISINTEGRATING ORAL EVERY 6 HOURS PRN
Status: DISCONTINUED | OUTPATIENT
Start: 2024-10-12 | End: 2024-10-15 | Stop reason: HOSPADM

## 2024-10-12 RX ORDER — FOLIC ACID 1 MG/1
1 TABLET ORAL DAILY
Status: DISCONTINUED | OUTPATIENT
Start: 2024-10-12 | End: 2024-10-15 | Stop reason: HOSPADM

## 2024-10-12 RX ORDER — PREDNISONE 5 MG/1
10 TABLET ORAL DAILY
Status: DISCONTINUED | OUTPATIENT
Start: 2024-10-12 | End: 2024-10-15 | Stop reason: HOSPADM

## 2024-10-12 RX ORDER — SIROLIMUS 0.5 MG/1
1 TABLET, FILM COATED ORAL DAILY
Status: DISCONTINUED | OUTPATIENT
Start: 2024-10-12 | End: 2024-10-15 | Stop reason: HOSPADM

## 2024-10-12 RX ORDER — LIDOCAINE 40 MG/G
CREAM TOPICAL
Status: DISCONTINUED | OUTPATIENT
Start: 2024-10-12 | End: 2024-10-15 | Stop reason: HOSPADM

## 2024-10-12 RX ORDER — LOSARTAN POTASSIUM 25 MG/1
25 TABLET ORAL DAILY
Status: DISCONTINUED | OUTPATIENT
Start: 2024-10-12 | End: 2024-10-15 | Stop reason: HOSPADM

## 2024-10-12 RX ORDER — URSODIOL 250 MG/1
250 TABLET, FILM COATED ORAL 2 TIMES DAILY
Status: DISCONTINUED | OUTPATIENT
Start: 2024-10-12 | End: 2024-10-15 | Stop reason: HOSPADM

## 2024-10-12 RX ORDER — NICOTINE POLACRILEX 4 MG
15-30 LOZENGE BUCCAL
Status: DISCONTINUED | OUTPATIENT
Start: 2024-10-12 | End: 2024-10-15 | Stop reason: HOSPADM

## 2024-10-12 RX ORDER — FUROSEMIDE 20 MG/1
20 TABLET ORAL DAILY
Status: DISCONTINUED | OUTPATIENT
Start: 2024-10-12 | End: 2024-10-15 | Stop reason: HOSPADM

## 2024-10-12 RX ORDER — AMOXICILLIN 250 MG
2 CAPSULE ORAL 2 TIMES DAILY PRN
Status: DISCONTINUED | OUTPATIENT
Start: 2024-10-12 | End: 2024-10-15 | Stop reason: HOSPADM

## 2024-10-12 RX ORDER — CALCITRIOL 0.25 UG/1
0.25 CAPSULE, LIQUID FILLED ORAL DAILY
Status: DISCONTINUED | OUTPATIENT
Start: 2024-10-12 | End: 2024-10-15 | Stop reason: HOSPADM

## 2024-10-12 RX ORDER — FERROUS SULFATE 325(65) MG
325 TABLET ORAL 2 TIMES DAILY
Status: DISCONTINUED | OUTPATIENT
Start: 2024-10-12 | End: 2024-10-15 | Stop reason: HOSPADM

## 2024-10-12 RX ORDER — DEXTROSE MONOHYDRATE 25 G/50ML
25-50 INJECTION, SOLUTION INTRAVENOUS
Status: DISCONTINUED | OUTPATIENT
Start: 2024-10-12 | End: 2024-10-15 | Stop reason: HOSPADM

## 2024-10-12 RX ORDER — NYSTATIN 100000 U/G
CREAM TOPICAL 2 TIMES DAILY
Status: DISCONTINUED | OUTPATIENT
Start: 2024-10-12 | End: 2024-10-15 | Stop reason: HOSPADM

## 2024-10-12 RX ORDER — HYDRALAZINE HYDROCHLORIDE 25 MG/1
25 TABLET, FILM COATED ORAL PRN
COMMUNITY
End: 2024-10-17 | Stop reason: ALTCHOICE

## 2024-10-12 RX ORDER — AMOXICILLIN 250 MG
1 CAPSULE ORAL 2 TIMES DAILY PRN
Status: DISCONTINUED | OUTPATIENT
Start: 2024-10-12 | End: 2024-10-15 | Stop reason: HOSPADM

## 2024-10-12 RX ORDER — ONDANSETRON 2 MG/ML
4 INJECTION INTRAMUSCULAR; INTRAVENOUS EVERY 6 HOURS PRN
Status: DISCONTINUED | OUTPATIENT
Start: 2024-10-12 | End: 2024-10-15 | Stop reason: HOSPADM

## 2024-10-12 RX ORDER — PROCHLORPERAZINE MALEATE 5 MG/1
5 TABLET ORAL EVERY 6 HOURS PRN
Status: DISCONTINUED | OUTPATIENT
Start: 2024-10-12 | End: 2024-10-15 | Stop reason: HOSPADM

## 2024-10-12 RX ADMIN — URSODIOL 250 MG: 250 TABLET ORAL at 10:42

## 2024-10-12 RX ADMIN — FERROUS SULFATE TAB 325 MG (65 MG ELEMENTAL FE) 325 MG: 325 (65 FE) TAB at 20:59

## 2024-10-12 RX ADMIN — MEROPENEM 1 G: 1 INJECTION, POWDER, FOR SOLUTION INTRAVENOUS at 12:48

## 2024-10-12 RX ADMIN — URSODIOL 250 MG: 250 TABLET ORAL at 20:59

## 2024-10-12 RX ADMIN — GABAPENTIN 300 MG: 250 SOLUTION ORAL at 01:46

## 2024-10-12 RX ADMIN — NYSTATIN: 100000 CREAM TOPICAL at 12:15

## 2024-10-12 RX ADMIN — HYDRALAZINE HYDROCHLORIDE 10 MG: 10 TABLET ORAL at 21:00

## 2024-10-12 RX ADMIN — LEVOTHYROXINE SODIUM 175 MCG: 0.03 TABLET ORAL at 10:50

## 2024-10-12 RX ADMIN — SIROLIMUS 1 MG: 0.5 TABLET, FILM COATED ORAL at 17:32

## 2024-10-12 RX ADMIN — APIXABAN 2.5 MG: 2.5 TABLET, FILM COATED ORAL at 10:39

## 2024-10-12 RX ADMIN — PREDNISONE 10 MG: 5 TABLET ORAL at 10:37

## 2024-10-12 RX ADMIN — METOPROLOL SUCCINATE 25 MG: 25 TABLET, EXTENDED RELEASE ORAL at 10:44

## 2024-10-12 RX ADMIN — NYSTATIN: 100000 CREAM TOPICAL at 21:00

## 2024-10-12 RX ADMIN — EZETIMIBE 10 MG: 10 TABLET ORAL at 10:39

## 2024-10-12 RX ADMIN — APIXABAN 2.5 MG: 2.5 TABLET, FILM COATED ORAL at 20:59

## 2024-10-12 RX ADMIN — FERROUS SULFATE TAB 325 MG (65 MG ELEMENTAL FE) 325 MG: 325 (65 FE) TAB at 10:50

## 2024-10-12 RX ADMIN — FOLIC ACID 1 MG: 1 TABLET ORAL at 10:40

## 2024-10-12 RX ADMIN — GABAPENTIN 300 MG: 250 SOLUTION ORAL at 21:00

## 2024-10-12 RX ADMIN — CALCITRIOL CAPSULES 0.25 MCG 0.25 MCG: 0.25 CAPSULE ORAL at 10:38

## 2024-10-12 RX ADMIN — SITAGLIPTIN 50 MG: 50 TABLET, FILM COATED ORAL at 10:38

## 2024-10-12 RX ADMIN — HYDRALAZINE HYDROCHLORIDE 10 MG: 10 TABLET ORAL at 06:36

## 2024-10-12 ASSESSMENT — ACTIVITIES OF DAILY LIVING (ADL)
ADLS_ACUITY_SCORE: 33
ADLS_ACUITY_SCORE: 35
ADLS_ACUITY_SCORE: 35
ADLS_ACUITY_SCORE: 33
ADLS_ACUITY_SCORE: 33
ADLS_ACUITY_SCORE: 36
ADLS_ACUITY_SCORE: 33
ADLS_ACUITY_SCORE: 33
ADLS_ACUITY_SCORE: 36
ADLS_ACUITY_SCORE: 35
ADLS_ACUITY_SCORE: 36
ADLS_ACUITY_SCORE: 35
ADLS_ACUITY_SCORE: 35
ADLS_ACUITY_SCORE: 36
ADLS_ACUITY_SCORE: 36
ADLS_ACUITY_SCORE: 35
ADLS_ACUITY_SCORE: 33
ADLS_ACUITY_SCORE: 35
ADLS_ACUITY_SCORE: 35

## 2024-10-12 NOTE — ED TRIAGE NOTES
Pt reports burning and frequency with urination. Pt reports symptoms started this afternoon. Pt denies back pain. Pt reports she is immunosuppressed

## 2024-10-12 NOTE — PHARMACY-ADMISSION MEDICATION HISTORY
Pharmacy Intern Admission Medication History    Admission medication history is complete. The information provided in this note is only as accurate as the sources available at the time of the update.    Information Source(s): Patient via in-person    Pertinent Information:     Per pt, doctor recently switched her from hydralazine 25 mg prn to losartan 25 mg daily however she has not started since [facility]ma has not been able to deliver the prescription yet.    Changes made to PTA medication list:  Added: hydralazine  Deleted: None  Changed: None    Allergies reviewed with patient and updates made in EHR: yes    Medication History Completed By: Flower Kee 10/12/2024 9:08 AM    PTA Med List   Medication Sig Last Dose    apixaban ANTICOAGULANT (ELIQUIS ANTICOAGULANT) 2.5 MG tablet Take 1 tablet (2.5 mg) by mouth 2 times daily 10/11/2024 at pm    azelastine (ASTELIN) 0.1 % nasal spray Spray 1 spray into both nostrils as needed for allergies or rhinitis. Unknown at prn    calcitRIOL (ROCALTROL) 0.25 MCG capsule Take 1 capsule (0.25 mcg) by mouth daily. 10/11/2024 at am    D-MANNOSE PO Take 1 Scoop by mouth 2 times daily. 10/11/2024 at pm    estradiol (ESTRACE) 0.1 MG/GM vaginal cream Place vaginally every other day. Past Week    evolocumab (REPATHA SURECLICK) 140 MG/ML prefilled autoinjector Inject 1 mL (140 mg) subcutaneously every 14 days. . Please have fasting labs drawn for further refills. 10/6/2024    ezetimibe (ZETIA) 10 MG tablet Take 1 tablet (10 mg) by mouth daily. 10/11/2024 at am    Ferrous Sulfate 324 MG TBEC Take 1 tablet by mouth 2 times daily. 10/11/2024 at pm    folic acid (FOLVITE) 1 MG tablet Take 1 tablet (1 mg) by mouth daily 10/11/2024 at am    furosemide (LASIX) 20 MG tablet Take 1 tablet (20 mg) by mouth daily 10/11/2024 at am    gabapentin (NEURONTIN) 250 MG/5ML solution Take 6 mLs (300 mg) by mouth at bedtime 10/11/2024 at pm    glucose (BD GLUCOSE) 5 g chewable tablet Take 2 tablets (10 g) by  mouth as needed (low blood sugar) Unknown at prn    hydrALAZINE (APRESOLINE) 25 MG tablet Take 25 mg by mouth as needed (systolic bp >160). Unknown at prn    hypromellose (ARTIFICIAL TEARS) 0.4 % SOLN ophthalmic solution Apply 1 drop to eye every hour as needed for dry eyes Unknown at prn    levothyroxine (SYNTHROID/LEVOTHROID) 175 MCG tablet Take 1 tablet (175 mcg) by mouth daily. 10/11/2024 at am    linagliptin (TRADJENTA) 5 MG TABS tablet Take 1 tablet (5 mg) by mouth daily 10/11/2024 at am    meclizine (ANTIVERT) 25 MG tablet Take 1 tablet (25 mg) by mouth as needed for dizziness or other (migraines) Unknown at prn    metoprolol succinate ER (TOPROL XL) 25 MG 24 hr tablet Take 1 tablet (25 mg) by mouth daily. Take an extra tablet daily as needed for breakthrough afib. May repeat in two hours if heart rate remains >100bpm (no more than 3 tablets per day). 10/11/2024 at am    Multiple Vitamins-Minerals (PRESERVISION AREDS 2) CAPS Take 1 capsule by mouth 2 times daily 10/11/2024 at pm    Nutrisource Fiber PO packet Take 1 packet by mouth daily. 10/11/2024 at am    omega-3 acid ethyl esters (LOVAZA) 1 g capsule Take 1 capsule by mouth 2 times daily 10/11/2024 at pm    predniSONE (DELTASONE) 10 MG tablet Take 1 tablet (10 mg) by mouth daily. 10/11/2024 at am    sirolimus (GENERIC EQUIVALENT) 1 MG tablet Take 1 tablet (1 mg) by mouth daily. 10/11/2024 at am    ursodiol (ACTIGALL) 250 MG tablet Take 1 tablet (250 mg) by mouth 2 times daily 10/11/2024 at pm

## 2024-10-12 NOTE — PROGRESS NOTES
Physician Attestation   I, Dory Ambrosio DO, was present with the medical/DOMI student who participated in the service and in the documentation of the note.  I have verified the history and personally performed the physical exam and medical decision making.  I agree with the assessment and plan of care as documented in the note.  48 MINUTES SPENT BY ME on the date of service doing chart review, history, exam, documentation & further activities per the note.    Dory Ambrosio DO  Date of Service (when I saw the patient): 10/12/24      [facility]    Medicine Progress Note - Hospitalist Service    Date of Admission:  10/11/2024    Assessment & Plan     Luz Thompson is a 74 year old female with past medical history significant for PBC s/p liver transplant in 2002 on chronic immunosuppression, ESBL/VRE infections, frequent UTIs, CKDIIIb, paroxysmal atrial fibrillation on Eliquis with loop recorder in place, DVT, T2DM, lymphedema, HTN, HLD, CVA, hypothyroidism. She was recently hospitalized requiring ICU care for septic shock thought secondary to UTI from 09/22/24 to 09/25/24. She presents with urinary symptoms of frequency and dysuria which started the afternoon prior to admission. In the ED, mild leukocytosis at 12.8, afebrile. She was admitted inpatient for treatment of suspected UTI, on meropenem, urine cultures pending.     Acute Complicated UTI  Frequent UTIs  She presents with urinary frequency and dysuria which began the afternoon prior to admission. She reports a mild fever at home prior to admission as well. Patient is immunocompromised from transplant medications. She was recently hospitalized requiring ICU care for septic shock thought secondary to UTI from 09/22/24 to 09/25/24. At that time blood cultures grew ESBL Klebsiella and Pseudomonas, urine cultures also grew ESBL Klebsiella. She discharged from the hospital on IV meropenem planned 7 day course. Her outpatient course  of meropenem was complicated by difficulty with her vascular access (see ED note 10/01/24), at which time her meropenem course was stopped on day 6 after discharge. She had complete resolution of symptoms at that time. Additionally the patient has extensive antibiotic allergies.   - Mild leukocytosis at 12.8 on admission improved to 7.9 now. Afebrile since presentation.   - Continue meropenem  - Follow urine and blood culture results and consider consulting Infectious Disease dependent on culture results    Acute Kidney Injury  CKD, stage IIIb  Baseline creatinine about 1.5. Her creatinine was 1.83 on presentation. Most likely etiology dehydration and acute infection. Creatinine improved to 1.7.   - Encouraging patient to drink plenty of fluids. She is tolerating this well so no need for IV fluids at this time.   - Hold losartan and lasix  - Monitor     Hypertension  - Continue PTA metoprolol  - BP was elevated to 180s on admission  - hydralazine 10 mg po q8h was added on admission. Now normotensive. Hold parameters in place. She has PTA hydralazine 25 she takes at home as needed. Continue scheduled hydralazine until she can take her losartan and lasix again.   - Holding PTA losartan and lasix as above. She was recently prescribed losartan and had not yet started taking it.     S/p Liver Transplant  - PTA prednisone, sirolimus, ursodiol     Lymphedema  Atrial fibrillation  History CVA  She has lymphedema with trace bilateral lower extremity pitting edema on exam with some wrinkling of the skin.  She has paroxysmal A-fib and is on Eliquis 2.5 mg twice daily and metoprolol succinate 25 mg daily with additional pill in pocket strategy, hydralazine 25 mg daily as needed for SBP greater than 160, Zetia 10 mg daily, and furosemide 20 mg daily.  She has a loop recorder in place.  She had history of multiple tiny likely watershed infarcts in the setting of hypotension in 2001.  -Resume Eliquis, metoprolol, start scheduled  "hydralazine (until she resumes her home Lasix and losartan, as above), and Zetia  -Strict intake and output and daily weights     Generalized weakness  Secondary to acute infection.  Lives in independent living facility.  Uses a cane in the home and a walker when she is out.  -PT/SW consulted     Type II DM  PTA on linagliptin 5 mg daily.  A1c was 6.9 in April.  -Resume linagliptin  -Medium dose sliding scale aspart     Chronic back and left knee pain  PTA gabapentin 300 mg at bedtime. Pain regimen PRN.      Hypothyroidism  Previous thyroid cancer and thyroidectomy.  Resume PTA levothyroxine 175 mcg daily.          Diet: Combination Diet Regular Diet    DVT Prophylaxis: on Eliquis  Ron Catheter: Not present  Lines: None     Cardiac Monitoring: None  Code Status: Full Code      Clinically Significant Risk Factors Present on Admission                # Drug Induced Coagulation Defect: home medication list includes an anticoagulant medication    # Hypertension: Noted on problem list    # Anemia: based on hgb <11      # DMII: A1C = 6.9 % (Ref range: <=5.7 %) within past 6 months    # Overweight: Estimated body mass index is 28.44 kg/m  as calculated from the following:    Height as of this encounter: 1.702 m (5' 7\").    Weight as of this encounter: 82.4 kg (181 lb 9.6 oz).              Disposition Plan     Medically Ready for Discharge: Anticipated in 2-4 Days         The patient's care was discussed with the Attending Physician, Dr. Ambrosio .    Yasir Costa, MS3  Medical Student  Hospitalist Service  [facility]  Securely message with FTAPI Software (more info)  Text page via [facility] Paging/Directory   ______________________________________________________________________    Interval History   Chart reviewed, patient interviewed. She says she is doing well this morning and better than yesterday when she presented to the ED. She had developed symptoms of urinary frequency and dysuria earlier yesterday " afternoon before presenting last night. Explains that she unfortunately has urinary tract infections frequently. She recalls a normal cystoscopy done by a Urologist in [place]. She recently moved back to [place] and has been reestablishing care with primary care, specialists since then.     Physical Exam   Vital Signs: Temp: 97.9  F (36.6  C) Temp src: Temporal BP: 118/54 Pulse: 62   Resp: 18 SpO2: 97 % O2 Device: None (Room air)    Weight: 181 lbs 9.6 oz    Constitutional: awake, alert, cooperative, no apparent distress, and appears stated age  Eyes: Sclera clear, conjunctiva normal  Hematologic / Lymphatic: no cervical lymphadenopathy  Respiratory: No increased work of breathing, good air exchange, clear to auscultation bilaterally, no crackles or wheezing  Cardiovascular: Regular rate and rhythm, normal S1 and S2, no S3 or S4, and no murmur noted. Trace bilateral lower extremity edema.   GI: No scars, soft, non-distended, non-tender, no masses palpated  Skin: no bruising or bleeding, normal skin color, texture, turgor, and no redness, warmth, or swelling  Neurologic: Awake, alert, oriented to name, place and time. Motor and sensory grossly intact.   Neuropsychiatric: Orientation, mood, affect, memory, and insight are appropriate.     Medical Decision Making             Data     I have personally reviewed the following data over the past 24 hrs:    7.9  \   10.4 (L)   / 307     141 105 32.3 (H) /  125 (H)   4.3 22 1.70 (H) \     Procal: N/A CRP: N/A Lactic Acid: 0.8         Imaging results reviewed over the past 24 hrs:   No results found for this or any previous visit (from the past 24 hour(s)).

## 2024-10-12 NOTE — PLAN OF CARE
"Goal Outcome Evaluation:      Plan of Care Reviewed With: patient    Overall Patient Progress: no changeOverall Patient Progress: no change         Arrived from ED  Minimal pain. RA. Calls appropriately. Acosta Maria IV.  BP initially elevated, did come back down  Standby assist with cane. BG monitered  Discharge back to independent living facility tbd      Problem: Adult Inpatient Plan of Care  Goal: Plan of Care Review  Description: The Plan of Care Review/Shift note should be completed every shift.  The Outcome Evaluation is a brief statement about your assessment that the patient is improving, declining, or no change.  This information will be displayed automatically on your shift  note.  Outcome: Progressing  Flowsheets (Taken 10/12/2024 0336)  Plan of Care Reviewed With: patient  Overall Patient Progress: no change  Goal: Patient-Specific Goal (Individualized)  Description: You can add care plan individualizations to a care plan. Examples of Individualization might be:  \"Parent requests to be called daily at 9am for status\", \"I have a hard time hearing out of my right ear\", or \"Do not touch me to wake me up as it startles  me\".  Outcome: Progressing  Goal: Absence of Hospital-Acquired Illness or Injury  Outcome: Progressing  Intervention: Identify and Manage Fall Risk  Recent Flowsheet Documentation  Taken 10/12/2024 0101 by Curly Thompson RN  Safety Promotion/Fall Prevention:   activity supervised   assistive device/personal items within reach   clutter free environment maintained   increased rounding and observation   increase visualization of patient   lighting adjusted   nonskid shoes/slippers when out of bed   room near nurse's station   room door open   room organization consistent   safety round/check completed  Intervention: Prevent Skin Injury  Recent Flowsheet Documentation  Taken 10/12/2024 0101 by Curly Thompson RN  Body Position: position changed independently  Skin Protection: adhesive " use limited  Device Skin Pressure Protection:   tubing/devices free from skin contact   positioning supports utilized   adhesive use limited   absorbent pad utilized/changed  Intervention: Prevent and Manage VTE (Venous Thromboembolism) Risk  Recent Flowsheet Documentation  Taken 10/12/2024 0101 by Curly Thompson RN  VTE Prevention/Management: SCDs off (sequential compression devices)  Intervention: Prevent Infection  Recent Flowsheet Documentation  Taken 10/12/2024 0101 by Curly Thompson, RN  Infection Prevention:   hand hygiene promoted   equipment surfaces disinfected   environmental surveillance performed   single patient room provided  Goal: Optimal Comfort and Wellbeing  Outcome: Progressing  Goal: Readiness for Transition of Care  Outcome: Progressing  Intervention: Mutually Develop Transition Plan  Recent Flowsheet Documentation  Taken 10/12/2024 0045 by Curly Thompson, RN  Equipment Currently Used at Home: cane, straight

## 2024-10-12 NOTE — ED PROVIDER NOTES
Emergency Department Note      History of Present Illness     Chief Complaint   Urinary Frequency    HPI   Luz Thompson is a 74 year old female anticoagulated with Eliquis with history of type 2 diabetes, status post liver transplant on immunosuppression, history of ESBL, presenting with urinary frequency. [place] reports that today symptoms of a UTI began to present, she notes dysuria, dryness, a low grade fever, and slight shortness of breath. She notes that she is immunosuppressed because she is a liver transplant recipient. [place] denies hematuria, abdominal pain, or back pain.     Independent Historian   None    Review of External Notes   I dependently reviewed patient's discharge summary from 9/25/2024, when she developed septic shock secondary to UTI.    Past Medical History     Medical History and Problem List   Anemia   Anisometropia   Astigmatism   Allergic rhinitis   Acute stress reaction   Atrophic vaginitis   Anxiety   Bladder infection, chronic   Chalazion of left upper eyelid  CKD, stage 3  Coccidioidomycosis  CVA   Cirrhosis biliary   Cellulitis of lower extremity   Cataract   Calcified granuloma of the lung  DVT   Dyspnea   Dialysis   Diabetic peripheral neuropathy   Diverticulosis of sigmoid colon   Waqas-Barr virus    Eczema   Emphysema   Edema of lower extremity   Esophageal varices   Escherichia coli sepsis   Glaucoma  Hearing loss   Hyperglycemia  Hyponatremia   Hypertensive urgency    Heart murmur   Hepatitis   HDL deficiency   Hyperlipidemia   Hypertension   Hypokalemia   Hypocalcemia   Hypertriglyceridemia   Immunosuppression   Infective chronic otitis externa of left ear   Lung infection   Leucocytosis   Lactic acidosis   Macular degeneration   Migraines   Mumps   MDD  Metabolic acidosis   Normochromic normocytic anemia   Non-traumatic rhabdomyolysis  Nonsenile cataract   Osteoarthritis   Onychomycosis of toenails  Osteoporosis   Otitis externa   Obstructive sleep apnea    Peripheral venous insufficiency   Papillary thyroid carcinoma   Presbyopia   Paroxysmal atrial fibrillation   Postablative hypothyroidism   Primary biliary cirrhosis   [place] fever  Sjogren's syndrome   Severe vertigo   Sepsis   Stroke   Systemic inflammatory response syndrome   Senile purpura   Squamous cell carcinoma   Thyroid cancer   Type 2 diabetes   TIA   Tympanic membrane perforation, left   Vitamin D deficiency   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    Medications   Astelin   Actigall   Antivert   Cozaar   Deltasone   Estrace  Eliquis    Folvite   Glucose   Sirolimus   Synthroid   Repatha   Rocaltrol   Lovaza   Lasix   Neurontin   Zetia   Tradjenta   Toprol     Surgical History   Past Surgical History:   Procedure Laterality Date    APPENDECTOMY  1961    CATARACT IOL, RT/LT      RE12/19/2013, LE12/10/2013 - Toric lenses    CHOLECYSTECTOMY  1991    COLONOSCOPY  03/10/2014    Procedure: COLONOSCOPY;;  Surgeon: Gentry Ramirez MD;  Location: [facility]    CYSTOSCOPY      ear drum repair      ENDOBRONCHIAL ULTRASOUND FLEXIBLE N/A 09/29/2017    Procedure: ENDOBRONCHIAL ULTRASOUND FLEXIBLE;  Flexible Bronchoscopy, Endobronchial Ultrasound, Transbronchial Needle Aspiration ;  Surgeon: Eden Clinton MD;  Location: [facility]    ENDOSCOPIC RETROGRADE CHOLANGIOPANCREATOGRAM  09/19/2013    Procedure: ENDOSCOPIC RETROGRADE CHOLANGIOPANCREATOGRAM;  Endoscopic Retrograde Cholangiopancreatogram with single balloon enteroscopy, ballon sweep of bile duct;  Surgeon: Brett Membreno MD;  Location: [facility] OR     KNEE SCOPE,MED/LAT MENISECTOMY Right 08/10/2012    partial medial menisectomy only    KNEE SURGERY  1966    R knee    PICC INSERTION  09/18/2013    4fr SL PASV PICC, 40cm (1cm external) in the R basilic vein w/ tip in the low SVC    PICC INSERTION  02/21/2014    5 fr DL BioFlo Navilyst PICC, 46 cm (3 cm external) in the L basilic vein w/ tip in the SVC RA junction.    THYROIDECTOMY  03/2010    TRANSPLANT LIVER RECIPIENT LIVING UNRELATED   "05/2002     Physical Exam     Patient Vitals for the past 24 hrs:   BP Temp Temp src Pulse Resp SpO2 Height Weight   10/11/24 2303 -- -- -- -- -- 100 % -- --   10/11/24 2302 (!) 173/69 -- -- 77 -- -- -- --   10/11/24 2106 (!) 148/107 97.9  F (36.6  C) Temporal 92 20 99 % 1.702 m (5' 7\") 81.2 kg (179 lb)     Physical Exam  Vitals and nursing note reviewed.   Constitutional:       General: She is not in acute distress.     Appearance: Normal appearance. She is not ill-appearing or diaphoretic.   HENT:      Mouth/Throat:      Mouth: Mucous membranes are moist.   Eyes:      General: No scleral icterus.     Conjunctiva/sclera: Conjunctivae normal.   Cardiovascular:      Rate and Rhythm: Normal rate.      Heart sounds: Normal heart sounds.   Pulmonary:      Effort: No respiratory distress.      Breath sounds: Normal breath sounds.   Abdominal:      General: Abdomen is flat.      Tenderness: There is no abdominal tenderness. There is no right CVA tenderness or left CVA tenderness.   Skin:     General: Skin is warm and dry.      Findings: No rash.   Neurological:      Mental Status: She is alert.           Diagnostics     Lab Results   Labs Ordered and Resulted from Time of ED Arrival to Time of ED Departure   BASIC METABOLIC PANEL - Abnormal       Result Value    Sodium 143      Potassium 4.1      Chloride 104      Carbon Dioxide (CO2) 23      Anion Gap 16 (*)     Urea Nitrogen 32.7 (*)     Creatinine 1.83 (*)     GFR Estimate 28 (*)     Calcium 8.8      Glucose 126 (*)    ROUTINE UA WITH MICROSCOPIC REFLEX TO CULTURE - Abnormal    Color Urine Orange (*)     Appearance Urine Cloudy (*)     Glucose Urine Negative      Bilirubin Urine Negative      Ketones Urine Negative      Specific Gravity Urine 1.022      Blood Urine Large (*)     pH Urine 6.0      Protein Albumin Urine 300 (*)     Urobilinogen Urine Normal      Nitrite Urine Negative      Leukocyte Esterase Urine Large (*)     Bacteria Urine Many (*)     RBC Urine 119 " (*)     WBC Urine >182 (*)     Squamous Epithelials Urine 17 (*)    CBC WITH PLATELETS AND DIFFERENTIAL - Abnormal    WBC Count 12.8 (*)     RBC Count 4.00      Hemoglobin 12.0      Hematocrit 37.2      MCV 93      MCH 30.0      MCHC 32.3      RDW 14.6      Platelet Count 360      % Neutrophils 76      % Lymphocytes 11      % Monocytes 10      % Eosinophils 1      % Basophils 0      % Immature Granulocytes 1      NRBCs per 100 WBC 0      Absolute Neutrophils 9.8 (*)     Absolute Lymphocytes 1.4      Absolute Monocytes 1.3      Absolute Eosinophils 0.1      Absolute Basophils 0.1      Absolute Immature Granulocytes 0.1      Absolute NRBCs 0.0     ISTAT GASES LACTATE VENOUS POCT - Abnormal    Lactic Acid POCT 0.8      Bicarbonate Venous POCT 27      O2 Sat, Venous POCT 57 (*)     pCO2 Venous POCT 41      pH Venous POCT 7.42      pO2 Venous POCT 29     URINE CULTURE   BLOOD CULTURE       Imaging   No orders to display       Independent Interpretation   None    ED Course      Medications Administered   Medications   meropenem (MERREM) 1 g vial to attach to  mL bag (1 g Intravenous $New Bag 10/11/24 2314)   hydrALAZINE (APRESOLINE) tablet 10 mg (10 mg Oral $Given 10/11/24 2324)       Procedures   Procedures     Discussion of Management   Admitting Hospitalist, Dr Shaw    ED Course   ED Course as of 10/11/24 2325   Fri Oct 11, 2024   2140 I obtained history and examined the patient as noted above.      Additional Documentation  None    Medical Decision Making / Diagnosis     CMS Diagnoses: None    MIPS       None    [facility]   Luz Thompson is a 74 year old female with complex medical history presenting with urinary symptoms.  Vital signs are reassuring upon arrival.  High suspicion for UTI given patient's presentation.  Low suspicion for pyelonephritis or sepsis physiology based on symptoms at present.  Did obtain labs, notable for mild leukocytosis, decreased renal function consistent with acute kidney injury,  and grossly infected urine.  Lactate visit was within normal limits.  Blood culture was obtained.  After reviewing previous sensitivities of last urine culture and September 2024, will start patient on meropenem and admit patient to hospitalist for further evaluation and management.     Disposition   The patient was admitted to the hospital.     Diagnosis     ICD-10-CM    1. Acute cystitis without hematuria  N30.00       2. Liver replaced by transplant (H)  Z94.4       3. Immunosuppression (H)  D84.9       4. 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  Z22.358       5. DERREK (acute kidney injury) (H)  N17.9            Discharge Medications   New Prescriptions    No medications on file     I, Clementine Laureano, am serving as a scribe at 9:39 PM on 10/11/2024 to document services personally performed by Chandler Dang MD based on my observations and the provider's statements to me.        Chandler Dang MD  10/11/24 5807

## 2024-10-12 NOTE — H&P
[facility]    Hospitalist Admission Note    Name: Luz Thompson    MRN: [id number]  YOB: 1950    Age: 74 year old  Date of admission: 10/11/2024  Primary care provider: No Ref-Primary, Physician  Admitting physician : Kenneth Shaw M.D. ,M.B.A.       Brief summary of admission assessment/MDM:    Luz Thompson is a 74 year old  female  with past medical history is   PBC s/p liver transplant in 2002 on chronic immunosuppression, ESBL/VRE infections, frequent UTIs, paroxysmal A-fib on Eliquis with loop recorder in place, DVT, DM 2, lymphedema, HTN, HLD, CVA, hypothyroidism, Sjogren syndrome, anxiety, glaucoma, anemia, chronic back pain, CKD stage IIIb, and osteoporosis who was recently hospitalized from September 22 through 25, 2024 for septic shock requiring ICU care presents with urinary symptoms with frequency and dysuria which started afternoon of admission.    Problem List with Assessment and Plan:     #1.  Acute complicated UTI   -- Presents with dysuria and frequency.  Patient is immunocompromised from transplant medications.  Recent admission for ESBL Klebsiella and Pseudomonas sepsis.  She also had Klebsiella ESBL on previous urine culture from September 22.  -- She has mild leukocytosis on presentation afebrile.  -- She was given a dose of meropenem in the ED.  Will continue meropenem, monitor urine culture and blood culture.  Consider infectious disease service consultation if she grew ESBL again.  She was discharged on meropenem IV for 7 more days after discharge last admission.    #2.  Acute kidney injury on chronic kidney disease  -- Patient has chronic kidney disease with baseline creatinine close to 1.5.  Her creatinine was 1.83 on presentation.  Suspect due to dehydration and acute infection.  -- Will encourage patient to drink plenty of fluid for now, hold losartan, Lasix and monitor kidney function.  Avoid nephrotoxic  medications    #3.  Hypertension, paroxysmal atrial fibrillation  -- Patient follows with Dr. Garcia of cardiology.  She was recently prescribed losartan but she did not start taking it.  Will continue metoprolol.  Hold losartan and Lasix.  -- Blood pressure is elevated in 170s.  Continue metoprolol and add hydralazine 10 mg every 8 hours until she resumes her home losartan and 60 when her kidney function returns to baseline.  Of note the patient has as needed hydralazine 25 to 50 mg she takes at home as needed.    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  She is chronically on prednisone 10 mg daily, sirolimus 1 mg daily, and ursodiol 250 mg twice daily that will be resumed while here.     Lymphedema  Atrial fibrillation  History CVA: She has lymphedema with trace-1+ bilateral lower extremity pitting edema on exam with some wrinkling of the skin.  She has paroxysmal A-fib and is on Eliquis 2.5 mg twice daily and metoprolol succinate 25 mg daily with additional pill in pocket strategy, hydralazine 25 mg daily as needed for SBP greater than 160, Zetia 10 mg daily, and furosemide 20 mg daily.  She has a loop recorder in place.  She had history of multiple tiny likely watershed infarcts in the setting of hypotension in 2001.  -Resume Eliquis, metoprolol, start scheduled hydralazine(until she resumes her home Lasix and losartan), and Zetia  -Strict intake and output and daily weights     Generalized weakness: Secondary to acute infection.  Lives in independent living facility.  Uses a cane in the home and a walker when she is out.  -Consult PT/SW     Type II DM: PTA on linagliptin 5 mg daily.  A1c was 6.9 in April.  -Resume linagliptin  -Medium dose sliding scale aspart     Chronic back and left knee pain: PTA on gabapentin 300 mg at bedtime.    -Resume PTA gabapentin.  Also ordered  acetaminophen for mild pain and oral Dilaudid 1 mg for moderate and 2 mg for severe pain every 4 hours as needed     Hypothyroidism: Previous thyroid cancer and thyroidectomy.  Resume PTA levothyroxine 175 mcg daily.     Sjogren syndrome           Care Plan:    # Admission Status: Inpatient      # Diet:Diet advanced    # Activity:Advance activity as tolerated    # Education/Counseling :Discussed treatment plan with the patient    # Consults: Consider involving ID if she grows ESBL again    # VTE prophylactic measures:prophylaxis against venous thromboembolism with DOAC      # Code Status:Full Code    # Disposition:anticipate discharge to home and Anticipate discharge in 3 days      Disclaimer: This note consists of symbols derived from keyboarding, dictation and/or voice recognition software. As a result, there may be errors in the script that have gone undetected. Please consider this when interpreting information found in this chart.             Chief Complaint:     Urinary frequency and dysuria  History is obtained from the patient          History of Present Illness:      This patient is a 74 year old  female with a significant past medical history of multiple medical problems listed above who presents with the following condition requiring a hospital admission:    Acute complicated UTI    Patient is known to us from previous admissions.  She presents with urinary frequency and dysuria which started today.  She denies any fever but she has been having chills.  She was recently established for ESBL sepsis and was treated with meropenem.  She has history of liver transplant on immunosuppressants including steroid.  Her blood pressure was high in the emergency department.  Evaluation in the emergency department revealed evidence of UTI but fortunately her blood pressure was not low.  Patient was given meropenem and admission was requested.         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            Past Surgical History:     Past Surgical History:   Procedure Laterality Date    APPENDECTOMY  1961    CATARACT IOL, RT/LT      RE12/19/2013, LE12/10/2013 - Toric lenses    CHOLECYSTECTOMY  1991    COLONOSCOPY  03/10/2014    Procedure: COLONOSCOPY;;  Surgeon: Gentry Ramirez MD;  Location:  GI    CYSTOSCOPY      ear drum repair      ENDOBRONCHIAL ULTRASOUND FLEXIBLE N/A 09/29/2017    Procedure: ENDOBRONCHIAL ULTRASOUND FLEXIBLE;  Flexible Bronchoscopy, Endobronchial Ultrasound, Transbronchial Needle Aspiration ;  Surgeon: Eden Clinton MD;  Location:  OR    ENDOSCOPIC RETROGRADE CHOLANGIOPANCREATOGRAM " " 2013    Procedure: ENDOSCOPIC RETROGRADE CHOLANGIOPANCREATOGRAM;  Endoscopic Retrograde Cholangiopancreatogram with single balloon enteroscopy, ballon sweep of bile duct;  Surgeon: Brett Membreno MD;  Location: [facility] OR    HC KNEE SCOPE,MED/LAT MENISECTOMY Right 08/10/2012    partial medial menisectomy only    KNEE SURGERY  1966    R knee    PICC INSERTION  2013    4fr SL PASV PICC, 40cm (1cm external) in the R basilic vein w/ tip in the low SVC    PICC INSERTION  2014    5 fr DL BioFlo Navilyst PICC, 46 cm (3 cm external) in the L basilic vein w/ tip in the SVC RA junction.    THYROIDECTOMY  2010    TRANSPLANT LIVER RECIPIENT LIVING UNRELATED  2002             Social History:     Social History     Tobacco Use    Smoking status: Former     Current packs/day: 0.00     Average packs/day: 1 pack/day for 18.0 years (18.0 ttl pk-yrs)     Types: Cigarettes     Start date: 1967     Quit date: 1985     Years since quittin.5    Smokeless tobacco: Never   Substance Use Topics    Alcohol use: Yes     Alcohol/week: 0.0 standard drinks of alcohol     Comment: rare - \"I toast at weddings\"             Family History:     Family History   Problem Relation Age of Onset    Hypertension Mother     Endometrial Cancer Mother     Hyperlipidemia Mother     Prostate Cancer Father     Macular Degeneration Father     Cancer - colorectal Maternal Grandmother         in her 80's, has surgery and removal of part of kidney,  at age 98    Heart Disease Maternal Grandfather          at 98    Glaucoma Maternal Grandfather     Cerebrovascular Disease Paternal Grandmother         in her 80's    Hypertension Paternal Grandmother     Heart Disease Paternal Grandfather         MI    Alzheimer Disease Paternal Grandfather     Allergies Son     Neurologic Disorder Daughter         Migraines    Breast Cancer Other     Anesthesia Reaction No family hx of     Crohn's Disease No family hx of     Ulcerative Colitis " No family hx of           Allergies:     Allergies   Allergen Reactions    Fluconazole Hives and Itching     Full body hives      Mycophenolate Diarrhea and Nausea and Vomiting     Patient stated it was chronic and lasted months      Penicillins Rash, Anaphylaxis, Hives and Itching    Simvastatin Muscle Pain (Myalgia)     severe  Other reaction(s): Myalgia caused by statin    Methotrexate Other (See Comments)     Other reaction(s): Sore  Sores in mouth, esophagus, and stomach.       Morphine And Codeine Itching and Other (See Comments)     Psych disturbance  Other reaction(s): Confusion, Mood alteration    Quinolones Anxiety, Dizziness, Headache, Other (See Comments), Palpitations and Unknown     Other reaction(s): Hyperactive behavior, Lightheadedness, Mood alteration    Dizzy, light headed    Dizziness, shaky, and jumpy    Benadryl [Diphenhydramine Hcl]      Insomnia     Capsules, Empty Gelatin [Gelatin]     Cephalosporins Itching    Ciprofloxacin Hcl Dizziness and Other (See Comments)    Lansoprazole Diarrhea    Azithromycin Itching    Doxycycline Itching and Unknown    Lisinopril Cough    Omeprazole Itching    Tolectin [Nsaids] Rash    Tolmetin Rash and Itching    Tramadol Rash, Hives and Itching             Medications:        Prior to Admission medications    Medication Sig Last Dose Taking? Auth Provider Long Term End Date   apixaban ANTICOAGULANT (ELIQUIS ANTICOAGULANT) 2.5 MG tablet Take 1 tablet (2.5 mg) by mouth 2 times daily   Taniya Wolff, APRN CNP No    azelastine (ASTELIN) 0.1 % nasal spray Spray 1 spray into both nostrils as needed for allergies or rhinitis.   Reported, Patient     calcitRIOL (ROCALTROL) 0.25 MCG capsule Take 1 capsule (0.25 mcg) by mouth daily.   Rachel Masterson MBBS Yes    D-MANNOSE PO Take 1 Scoop by mouth 2 times daily.   Unknown, Entered By History     estradiol (ESTRACE) 0.1 MG/GM vaginal cream Place vaginally every other day.   Unknown, Entered By History No     evolocumab (REPATHA SURECLICK) 140 MG/ML prefilled autoinjector Inject 1 mL (140 mg) subcutaneously every 14 days. . Please have fasting labs drawn for further refills.   Kirill Zuluaga MD     ezetimibe (ZETIA) 10 MG tablet Take 1 tablet (10 mg) by mouth daily.   Kirill Zuluaga MD Yes    Ferrous Sulfate 324 MG TBEC Take 1 tablet by mouth 2 times daily.   Reported, Patient     folic acid (FOLVITE) 1 MG tablet Take 1 tablet (1 mg) by mouth daily   Gentry Ramirez MD     furosemide (LASIX) 20 MG tablet Take 1 tablet (20 mg) by mouth daily   Aliya Pace MD Yes    gabapentin (NEURONTIN) 250 MG/5ML solution Take 6 mLs (300 mg) by mouth at bedtime   Gentry Ramirez MD Yes    glucose (BD GLUCOSE) 5 g chewable tablet Take 2 tablets (10 g) by mouth as needed (low blood sugar)   Jennifer Barraza MD     hypromellose (ARTIFICIAL TEARS) 0.4 % SOLN ophthalmic solution Apply 1 drop to eye every hour as needed for dry eyes   Aliya Pace MD     levothyroxine (SYNTHROID/LEVOTHROID) 175 MCG tablet Take 1 tablet (175 mcg) by mouth daily.   Rachel Masterson MBBS Yes    linagliptin (TRADJENTA) 5 MG TABS tablet Take 1 tablet (5 mg) by mouth daily   Rach Vasquez MD Yes    losartan (COZAAR) 25 MG tablet Take 1 tablet (25 mg) by mouth daily.   Kirill Zuluaga MD Yes    meclizine (ANTIVERT) 25 MG tablet Take 1 tablet (25 mg) by mouth as needed for dizziness or other (migraines)   Aliya Pace MD     metoprolol succinate ER (TOPROL XL) 25 MG 24 hr tablet Take 1 tablet (25 mg) by mouth daily. Take an extra tablet daily as needed for breakthrough afib. May repeat in two hours if heart rate remains >100bpm (no more than 3 tablets per day).   Kirill Zuluaga MD Yes    Multiple Vitamins-Minerals (PRESERVISION AREDS 2) CAPS Take 1 capsule by mouth 2 times daily   Aliya Pace MD No    Nutrisource Fiber PO packet Take 1 packet by mouth daily.   Unknown, Entered By History  No    omega-3 acid ethyl esters (LOVAZA) 1 g capsule Take 1 capsule by mouth 2 times daily   Britt Oswald NP     predniSONE (DELTASONE) 10 MG tablet Take 1 tablet (10 mg) by mouth daily.   Gentry Ramirez MD Yes    sirolimus (GENERIC EQUIVALENT) 1 MG tablet Take 1 tablet (1 mg) by mouth daily.   Gentry Ramirez MD No    ursodiol (ACTIGALL) 250 MG tablet Take 1 tablet (250 mg) by mouth 2 times daily   Gentry Ramirez MD            Review of Systems:     A Comprehensive greater than 10 system review of systems was carried out.  Pertinent positives and negatives are noted above in HPI.  Otherwise negative for contributory information.           Physical Exam:     Vital signs were reviewed    Temp:  [97.9  F (36.6  C)] 97.9  F (36.6  C)  Pulse:  [77-92] 77  Resp:  [20] 20  BP: (148-173)/() 173/69  SpO2:  [99 %-100 %] 100 %        GEN: awake, alert, cooperative, no apparent distress, oriented x 3    NECK:Supple ,no mass or thyromegaly     HEENT:  Normocephalic/atraumatic, no scleral icterus, no nasal discharge, mouth moist.    CV:  Regular rate and rhythm, no murmur or JVD.  S1 + S2 noted, no S3 or S4.    LUNGS:  Clear to auscultation bilaterally without rales/rhonchi/wheezing/retractions.  Symmetric chest rise on inhalation noted.    ABD:  Active bowel sounds, soft, non-tender/non-distended.  No rebound/guarding/rigidity.    EXT: Trace bilateral lower extremity edema  LGS: No cervical or axillary lymphadenopathy     SKIN:  Dry to touch, warm ,no exanthems noted in the visualized areas.    Neurologic:Grossly intact,non focal . No acute focal neurologic deficit     Psychaitric exam: Mood and affect normal     Additional significant  Findings:             Data:       All laboratory and imaging data in the past 24 hours reviewed     Results for orders placed or performed during the hospital encounter of 10/11/24   Basic metabolic panel     Status: Abnormal   Result Value Ref Range    Sodium 143 135 - 145 mmol/L     Potassium 4.1 3.4 - 5.3 mmol/L    Chloride 104 98 - 107 mmol/L    Carbon Dioxide (CO2) 23 22 - 29 mmol/L    Anion Gap 16 (H) 7 - 15 mmol/L    Urea Nitrogen 32.7 (H) 8.0 - 23.0 mg/dL    Creatinine 1.83 (H) 0.51 - 0.95 mg/dL    GFR Estimate 28 (L) >60 mL/min/1.73m2    Calcium 8.8 8.8 - 10.4 mg/dL    Glucose 126 (H) 70 - 99 mg/dL   UA with Microscopic reflex to Culture     Status: Abnormal    Specimen: Urine, Clean Catch   Result Value Ref Range    Color Urine Orange (A) Colorless, Straw, Light Yellow, Yellow    Appearance Urine Cloudy (A) Clear    Glucose Urine Negative Negative mg/dL    Bilirubin Urine Negative Negative    Ketones Urine Negative Negative mg/dL    Specific Gravity Urine 1.022 1.003 - 1.035    Blood Urine Large (A) Negative    pH Urine 6.0 5.0 - 7.0    Protein Albumin Urine 300 (A) Negative mg/dL    Urobilinogen Urine Normal Normal, 2.0 mg/dL    Nitrite Urine Negative Negative    Leukocyte Esterase Urine Large (A) Negative    Bacteria Urine Many (A) None Seen /HPF    RBC Urine 119 (H) <=2 /HPF    WBC Urine >182 (H) <=5 /HPF    Squamous Epithelials Urine 17 (H) <=1 /HPF    Narrative    Urine Culture ordered based on laboratory criteria   CBC with platelets and differential     Status: Abnormal   Result Value Ref Range    WBC Count 12.8 (H) 4.0 - 11.0 10e3/uL    RBC Count 4.00 3.80 - 5.20 10e6/uL    Hemoglobin 12.0 11.7 - 15.7 g/dL    Hematocrit 37.2 35.0 - 47.0 %    MCV 93 78 - 100 fL    MCH 30.0 26.5 - 33.0 pg    MCHC 32.3 31.5 - 36.5 g/dL    RDW 14.6 10.0 - 15.0 %    Platelet Count 360 150 - 450 10e3/uL    % Neutrophils 76 %    % Lymphocytes 11 %    % Monocytes 10 %    % Eosinophils 1 %    % Basophils 0 %    % Immature Granulocytes 1 %    NRBCs per 100 WBC 0 <1 /100    Absolute Neutrophils 9.8 (H) 1.6 - 8.3 10e3/uL    Absolute Lymphocytes 1.4 0.8 - 5.3 10e3/uL    Absolute Monocytes 1.3 0.0 - 1.3 10e3/uL    Absolute Eosinophils 0.1 0.0 - 0.7 10e3/uL    Absolute Basophils 0.1 0.0 - 0.2 10e3/uL     Absolute Immature Granulocytes 0.1 <=0.4 10e3/uL    Absolute NRBCs 0.0 10e3/uL   iStat Gases (lactate) venous, POCT     Status: Abnormal   Result Value Ref Range    Lactic Acid POCT 0.8 <=2.0 mmol/L    Bicarbonate Venous POCT 27 21 - 28 mmol/L    O2 Sat, Venous POCT 57 (L) 70 - 75 %    pCO2 Venous POCT 41 40 - 50 mm Hg    pH Venous POCT 7.42 7.32 - 7.43    pO2 Venous POCT 29 25 - 47 mm Hg   CBC with platelets differential     Status: Abnormal    Narrative    The following orders were created for panel order CBC with platelets differential.  Procedure                               Abnormality         Status                     ---------                               -----------         ------                     CBC with platelets and d...[[id number]]  Abnormal            Final result                 Please view results for these tests on the individual orders.        No results found for this or any previous visit (from the past 48 hour(s)).      Patient`s old medical records reviewed and case discussed with the ED physician.    ED course-Reviewed  and care plan discussed with Dr. Dang

## 2024-10-12 NOTE — ED NOTES
[facility]  ED Nurse Handoff Report    ED Chief complaint: Urinary Frequency  . ED Diagnosis:   Final diagnoses:   Acute cystitis without hematuria   Liver replaced by transplant (H)   Immunosuppression (H)   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   DERREK (acute kidney injury) (H)       Allergies:   Allergies   Allergen Reactions    Fluconazole Hives and Itching     Full body hives      Mycophenolate Diarrhea and Nausea and Vomiting     Patient stated it was chronic and lasted months      Penicillins Rash, Anaphylaxis, Hives and Itching    Simvastatin Muscle Pain (Myalgia)     severe  Other reaction(s): Myalgia caused by statin    Methotrexate Other (See Comments)     Other reaction(s): Sore  Sores in mouth, esophagus, and stomach.       Morphine And Codeine Itching and Other (See Comments)     Psych disturbance  Other reaction(s): Confusion, Mood alteration    Quinolones Anxiety, Dizziness, Headache, Other (See Comments), Palpitations and Unknown     Other reaction(s): Hyperactive behavior, Lightheadedness, Mood alteration    Dizzy, light headed    Dizziness, shaky, and jumpy    Benadryl [Diphenhydramine Hcl]      Insomnia     Capsules, Empty Gelatin [Gelatin]     Cephalosporins Itching    Ciprofloxacin Hcl Dizziness and Other (See Comments)    Lansoprazole Diarrhea    Azithromycin Itching    Doxycycline Itching and Unknown    Lisinopril Cough    Omeprazole Itching    Tolectin [Nsaids] Rash    Tolmetin Rash and Itching    Tramadol Rash, Hives and Itching       Code Status: Full Code    Activity level - Baseline/Home:  assist of 1.  Activity Level - Current:   assist of 1.   Lift room needed: No.   Bariatric: No   Needed: No   Isolation: No.   Infection: Not Applicable.     Respiratory status: Room air    Vital Signs (within 30 minutes):   Vitals:    10/11/24 2106 10/11/24 2302 10/11/24 2303 10/11/24 2330   BP: (!) 148/107 (!) 173/69  (!) 168/73   Pulse: 92 77  78   Resp: 20      Temp: 97.9  F  "(36.6  C)      TempSrc: Temporal      SpO2: 99%  100% 99%   Weight: 81.2 kg (179 lb)      Height: 1.702 m (5' 7\")          Cardiac Rhythm:  ,      Pain level:    Patient confused: No.   Patient Falls Risk: None  Elimination Status: Has voided     Patient Report - Initial Complaint: Urinary Frequency.   Focused Assessment: anticoagulated with Eliquis with history of type 2 diabetes, status post liver transplant on immunosuppression, history of ESBL, presenting with urinary frequency. Virginia reports that today symptoms of a UTI began to present, she notes dysuria, dryness, a low grade fever, and slight shortness of breath. She notes that she is immunosuppressed because she is a liver transplant recipient. Virginia denies hematuria, abdominal pain, or back pain.      Abnormal Results:   Labs Ordered and Resulted from Time of ED Arrival to Time of ED Departure   BASIC METABOLIC PANEL - Abnormal       Result Value    Sodium 143      Potassium 4.1      Chloride 104      Carbon Dioxide (CO2) 23      Anion Gap 16 (*)     Urea Nitrogen 32.7 (*)     Creatinine 1.83 (*)     GFR Estimate 28 (*)     Calcium 8.8      Glucose 126 (*)    ROUTINE UA WITH MICROSCOPIC REFLEX TO CULTURE - Abnormal    Color Urine Orange (*)     Appearance Urine Cloudy (*)     Glucose Urine Negative      Bilirubin Urine Negative      Ketones Urine Negative      Specific Gravity Urine 1.022      Blood Urine Large (*)     pH Urine 6.0      Protein Albumin Urine 300 (*)     Urobilinogen Urine Normal      Nitrite Urine Negative      Leukocyte Esterase Urine Large (*)     Bacteria Urine Many (*)     RBC Urine 119 (*)     WBC Urine >182 (*)     Squamous Epithelials Urine 17 (*)    CBC WITH PLATELETS AND DIFFERENTIAL - Abnormal    WBC Count 12.8 (*)     RBC Count 4.00      Hemoglobin 12.0      Hematocrit 37.2      MCV 93      MCH 30.0      MCHC 32.3      RDW 14.6      Platelet Count 360      % Neutrophils 76      % Lymphocytes 11      % Monocytes 10      % " Eosinophils 1      % Basophils 0      % Immature Granulocytes 1      NRBCs per 100 WBC 0      Absolute Neutrophils 9.8 (*)     Absolute Lymphocytes 1.4      Absolute Monocytes 1.3      Absolute Eosinophils 0.1      Absolute Basophils 0.1      Absolute Immature Granulocytes 0.1      Absolute NRBCs 0.0     ISTAT GASES LACTATE VENOUS POCT - Abnormal    Lactic Acid POCT 0.8      Bicarbonate Venous POCT 27      O2 Sat, Venous POCT 57 (*)     pCO2 Venous POCT 41      pH Venous POCT 7.42      pO2 Venous POCT 29     URINE CULTURE   BLOOD CULTURE        No orders to display       Treatments provided: See MAR  Family Comments: updated.  OBS brochure/video discussed/provided to patient:  N/A  ED Medications:   Medications   hydrALAZINE (APRESOLINE) tablet 10 mg (10 mg Oral $Given 10/11/24 6556)   meropenem (MERREM) 1 g vial to attach to  mL bag (1 g Intravenous $New Bag 10/11/24 2880)       Drips infusing:  No  For the majority of the shift this patient was Green.   Interventions performed were n/a.    Sepsis treatment initiated: No    Cares/treatment/interventions/medications to be completed following ED care: n/a    ED Nurse Name: Demar Josue RN  11:52 PM      RECEIVING UNIT ED HANDOFF REVIEW    Above ED Nurse Handoff Report was reviewed: Yes  Reviewed by: Curly Thompson RN on October 11, 2024 at 11:55 PM   ALAN Soni called the ED to inform them the note was read: Yes

## 2024-10-12 NOTE — CONSULTS
Care Management Initial Consult    General Information  Assessment completed with: Patient, Virginia  Type of CM/SW Visit: Initial Assessment    Primary Care Provider verified and updated as needed: Yes   Readmission within the last 30 days: previous discharge plan unsuccessful   Return Category: Medically unsuccessful treatment plan  Reason for Consult: discharge planning         Communication Assessment  Patient's communication style: spoken language (English or Bilingual)    Hearing Difficulty or Deaf: yes   Wear Glasses or Blind: yes    Cognitive  Cognitive/Neuro/Behavioral: WDL                      Living Environment:   People in home: alone     Current living Arrangements: apartment ([facility])      Able to return to prior arrangements: yes  Living Arrangement Comments: Was receiving home care with [facility] prior to admission    Family/Social Support:  Care provided by: self  Provides care for: no one       Current Resources:   Patient receiving home care services: Yes  Skilled Home Care Services: Physical Therapy, Occupational Therapy       Equipment currently used at home: cane, straight      Does the patient's insurance plan have a 3 day qualifying hospital stay waiver?  No    Lifestyle & Psychosocial Needs:  Social Determinants of Health     Food Insecurity: Low Risk  (10/12/2024)    Food Insecurity     Within the past 12 months, did you worry that your food would run out before you got money to buy more?: No     Within the past 12 months, did the food you bought just not last and you didn t have money to get more?: No   Depression: Not at risk (9/9/2024)    PHQ-2     PHQ-2 Score: 1   Housing Stability: High Risk (10/12/2024)    Housing Stability     Do you have housing? : No     Are you worried about losing your housing?: No   Tobacco Use: Medium Risk (9/9/2024)    Patient History     Smoking Tobacco Use: Former     Smokeless Tobacco Use: Never     Passive Exposure: Not on file   Financial Resource  Strain: Low Risk  (10/12/2024)    Financial Resource Strain     Within the past 12 months, have you or your family members you live with been unable to get utilities (heat, electricity) when it was really needed?: No   Alcohol Use: Not At Risk (11/9/2023)    Received from [facility]    AUDIT-C     Frequency of Alcohol Consumption: Never     Average Number of Drinks: Patient does not drink     Frequency of Binge Drinking: Never   Transportation Needs: Low Risk  (10/12/2024)    Transportation Needs     Within the past 12 months, has lack of transportation kept you from medical appointments, getting your medicines, non-medical meetings or appointments, work, or from getting things that you need?: No   Physical Activity: Insufficiently Active (11/9/2023)    Received from [facility]    Exercise Vital Sign     Days of Exercise per Week: 5 days     Minutes of Exercise per Session: 10 min   Interpersonal Safety: Low Risk  (10/12/2024)    Interpersonal Safety     Do you feel physically and emotionally safe where you currently live?: Yes     Within the past 12 months, have you been hit, slapped, kicked or otherwise physically hurt by someone?: No     Within the past 12 months, have you been humiliated or emotionally abused in other ways by your partner or ex-partner?: No   Stress: No Stress Concern Present (11/9/2023)    Received from [facility]    [employer] - Occupational Stress Questionnaire     Feeling of Stress : Only a little   Social Connections: Feeling Socially Integrated (12/20/2023)    Received from [facility]    OASIS : Social Isolation     Frequency of experiencing loneliness or isolation: Never   Health Literacy: Not on file       Additional Information:  JASPER met with Virginia at bedside today.    JASPER received consult for high URR 31%.    Virginia reported that she is a liver transplant.   She was admitted with a fever this admission.   Now her fever is down.    She  currently lives alone at [facility].   Prior to this admission, she was receiving PT/OT through [facility].   Her family supports her well.    Her daughter Gloria helps her at her apartment every Sunday after Buddhism.     Her granddaughter completes heavy cleaning 1 x month.    [facility] also provides light cleaning 1 x month for 30 minutes. Virginia will be admitted for another 2-4 days.      Next Steps: Virginia lives at [facility] and prior to hospital admission was receiving in home PT/OT at [facility] through [facility].  Pending PT/OT Eval for home care services to resume once she discharges.      Heydi Melton, NAINA [phone]

## 2024-10-13 LAB
ANION GAP SERPL CALCULATED.3IONS-SCNC: 14 MMOL/L (ref 7–15)
BACTERIA UR CULT: NORMAL
BUN SERPL-MCNC: 30.8 MG/DL (ref 8–23)
CALCIUM SERPL-MCNC: 9.4 MG/DL (ref 8.8–10.4)
CHLORIDE SERPL-SCNC: 104 MMOL/L (ref 98–107)
CREAT SERPL-MCNC: 1.64 MG/DL (ref 0.51–0.95)
EGFRCR SERPLBLD CKD-EPI 2021: 32 ML/MIN/1.73M2
ERYTHROCYTE [DISTWIDTH] IN BLOOD BY AUTOMATED COUNT: 15 % (ref 10–15)
GLUCOSE BLDC GLUCOMTR-MCNC: 106 MG/DL (ref 70–99)
GLUCOSE BLDC GLUCOMTR-MCNC: 154 MG/DL (ref 70–99)
GLUCOSE BLDC GLUCOMTR-MCNC: 184 MG/DL (ref 70–99)
GLUCOSE SERPL-MCNC: 112 MG/DL (ref 70–99)
HCO3 SERPL-SCNC: 23 MMOL/L (ref 22–29)
HCT VFR BLD AUTO: 33.8 % (ref 35–47)
HGB BLD-MCNC: 10.4 G/DL (ref 11.7–15.7)
MCH RBC QN AUTO: 29.6 PG (ref 26.5–33)
MCHC RBC AUTO-ENTMCNC: 30.8 G/DL (ref 31.5–36.5)
MCV RBC AUTO: 96 FL (ref 78–100)
PLATELET # BLD AUTO: 291 10E3/UL (ref 150–450)
POTASSIUM SERPL-SCNC: 4.3 MMOL/L (ref 3.4–5.3)
RBC # BLD AUTO: 3.51 10E6/UL (ref 3.8–5.2)
SODIUM SERPL-SCNC: 141 MMOL/L (ref 135–145)
WBC # BLD AUTO: 6.7 10E3/UL (ref 4–11)

## 2024-10-13 PROCEDURE — 250N000013 HC RX MED GY IP 250 OP 250 PS 637: Performed by: INTERNAL MEDICINE

## 2024-10-13 PROCEDURE — 85027 COMPLETE CBC AUTOMATED: CPT | Performed by: STUDENT IN AN ORGANIZED HEALTH CARE EDUCATION/TRAINING PROGRAM

## 2024-10-13 PROCEDURE — 999N000127 HC STATISTIC PERIPHERAL IV START W US GUIDANCE

## 2024-10-13 PROCEDURE — 120N000001 HC R&B MED SURG/OB

## 2024-10-13 PROCEDURE — 250N000012 HC RX MED GY IP 250 OP 636 PS 637: Performed by: STUDENT IN AN ORGANIZED HEALTH CARE EDUCATION/TRAINING PROGRAM

## 2024-10-13 PROCEDURE — 99232 SBSQ HOSP IP/OBS MODERATE 35: CPT | Performed by: STUDENT IN AN ORGANIZED HEALTH CARE EDUCATION/TRAINING PROGRAM

## 2024-10-13 PROCEDURE — 80048 BASIC METABOLIC PNL TOTAL CA: CPT | Performed by: STUDENT IN AN ORGANIZED HEALTH CARE EDUCATION/TRAINING PROGRAM

## 2024-10-13 PROCEDURE — 250N000012 HC RX MED GY IP 250 OP 636 PS 637: Performed by: INTERNAL MEDICINE

## 2024-10-13 PROCEDURE — 250N000011 HC RX IP 250 OP 636: Performed by: INTERNAL MEDICINE

## 2024-10-13 PROCEDURE — 36415 COLL VENOUS BLD VENIPUNCTURE: CPT | Performed by: STUDENT IN AN ORGANIZED HEALTH CARE EDUCATION/TRAINING PROGRAM

## 2024-10-13 RX ORDER — MECLIZINE HYDROCHLORIDE 25 MG/1
25 TABLET ORAL 3 TIMES DAILY PRN
Status: DISCONTINUED | OUTPATIENT
Start: 2024-10-13 | End: 2024-10-15 | Stop reason: HOSPADM

## 2024-10-13 RX ORDER — OMEGA-3-ACID ETHYL ESTERS 1 G/1
1 CAPSULE, LIQUID FILLED ORAL 2 TIMES DAILY
Status: DISCONTINUED | OUTPATIENT
Start: 2024-10-13 | End: 2024-10-15 | Stop reason: HOSPADM

## 2024-10-13 RX ADMIN — METOPROLOL SUCCINATE 25 MG: 25 TABLET, EXTENDED RELEASE ORAL at 09:16

## 2024-10-13 RX ADMIN — FERROUS SULFATE TAB 325 MG (65 MG ELEMENTAL FE) 325 MG: 325 (65 FE) TAB at 09:16

## 2024-10-13 RX ADMIN — GABAPENTIN 300 MG: 250 SOLUTION ORAL at 21:54

## 2024-10-13 RX ADMIN — URSODIOL 250 MG: 250 TABLET ORAL at 09:16

## 2024-10-13 RX ADMIN — FOLIC ACID 1 MG: 1 TABLET ORAL at 09:16

## 2024-10-13 RX ADMIN — APIXABAN 2.5 MG: 2.5 TABLET, FILM COATED ORAL at 09:16

## 2024-10-13 RX ADMIN — HYDRALAZINE HYDROCHLORIDE 10 MG: 10 TABLET ORAL at 21:54

## 2024-10-13 RX ADMIN — MEROPENEM 1 G: 1 INJECTION, POWDER, FOR SOLUTION INTRAVENOUS at 14:22

## 2024-10-13 RX ADMIN — EZETIMIBE 10 MG: 10 TABLET ORAL at 09:16

## 2024-10-13 RX ADMIN — NYSTATIN: 100000 CREAM TOPICAL at 21:57

## 2024-10-13 RX ADMIN — APIXABAN 2.5 MG: 2.5 TABLET, FILM COATED ORAL at 21:54

## 2024-10-13 RX ADMIN — FERROUS SULFATE TAB 325 MG (65 MG ELEMENTAL FE) 325 MG: 325 (65 FE) TAB at 21:55

## 2024-10-13 RX ADMIN — SIROLIMUS 1 MG: 0.5 TABLET, FILM COATED ORAL at 09:16

## 2024-10-13 RX ADMIN — CALCITRIOL CAPSULES 0.25 MCG 0.25 MCG: 0.25 CAPSULE ORAL at 09:16

## 2024-10-13 RX ADMIN — HYDRALAZINE HYDROCHLORIDE 10 MG: 10 TABLET ORAL at 06:55

## 2024-10-13 RX ADMIN — LEVOTHYROXINE SODIUM 175 MCG: 0.03 TABLET ORAL at 08:14

## 2024-10-13 RX ADMIN — HYDRALAZINE HYDROCHLORIDE 10 MG: 10 TABLET ORAL at 14:29

## 2024-10-13 RX ADMIN — SITAGLIPTIN 50 MG: 50 TABLET, FILM COATED ORAL at 09:16

## 2024-10-13 RX ADMIN — OMEGA-3-ACID ETHYL ESTERS 1 CAPSULE: 1 CAPSULE, LIQUID FILLED ORAL at 21:53

## 2024-10-13 RX ADMIN — MEROPENEM 1 G: 1 INJECTION, POWDER, FOR SOLUTION INTRAVENOUS at 00:09

## 2024-10-13 RX ADMIN — PREDNISONE 10 MG: 5 TABLET ORAL at 09:16

## 2024-10-13 RX ADMIN — URSODIOL 250 MG: 250 TABLET ORAL at 21:54

## 2024-10-13 RX ADMIN — NYSTATIN: 100000 CREAM TOPICAL at 09:17

## 2024-10-13 ASSESSMENT — ACTIVITIES OF DAILY LIVING (ADL)
ADLS_ACUITY_SCORE: 36
ADLS_ACUITY_SCORE: 40
ADLS_ACUITY_SCORE: 36
ADLS_ACUITY_SCORE: 40
ADLS_ACUITY_SCORE: 40
ADLS_ACUITY_SCORE: 36

## 2024-10-13 NOTE — PROGRESS NOTES
[facility]    Medicine Progress Note - Hospitalist Service    Date of Admission:  10/11/2024    Assessment & Plan     Luz Thompson is a 74 year old female with past medical history significant for PBC s/p liver transplant in 2002 on chronic immunosuppression, ESBL/VRE infections, frequent UTIs, CKDIIIb, paroxysmal atrial fibrillation on Eliquis with loop recorder in place, DVT, T2DM, lymphedema, HTN, HLD, CVA, hypothyroidism. She was recently hospitalized requiring ICU care for septic shock thought secondary to UTI from 09/22/24 to 09/25/24. She presents with urinary symptoms of frequency and dysuria which started the afternoon prior to admission. In the ED, mild leukocytosis at 12.8, afebrile. She was admitted inpatient for treatment of suspected UTI, on meropenem.     Acute Complicated UTI  Frequent UTIs  She presents with urinary frequency and dysuria which began the afternoon prior to admission. She reports a mild fever at home prior to admission as well. Patient is immunocompromised from transplant medications. She was recently hospitalized requiring ICU care for septic shock thought secondary to UTI from 09/22/24 to 09/25/24. At that time blood cultures grew ESBL Klebsiella and Pseudomonas, urine cultures also grew ESBL Klebsiella. She discharged from the hospital on IV meropenem planned 7 day course. Her outpatient course of meropenem was complicated by difficulty with her vascular access (see ED note 10/01/24), at which time her meropenem course was stopped on day 6 after discharge. She had complete resolution of symptoms at that time. Additionally the patient has extensive antibiotic allergies.   - Mild leukocytosis at 12.8 on admission improved to 7.9 now. Afebrile since presentation.   - Continue meropenem  -ID consulted     Acute Kidney Injury  CKD, stage IIIb  Baseline creatinine about 1.5. Her creatinine was 1.83 on presentation. Most likely etiology dehydration and acute  infection. Creatinine improved to 1.7.   - Encouraging patient to drink plenty of fluids. She is tolerating this well so no need for IV fluids at this time.   - Hold losartan and lasix  - Monitor      Hypertension  - Continue PTA metoprolol  - BP was elevated to 180s on admission  - hydralazine 10 mg po q8h was added on admission. Now normotensive. Hold parameters in place. She has PTA hydralazine 25 she takes at home as needed. Continue scheduled hydralazine until she can take her losartan and lasix again.   - Holding PTA losartan and lasix as above. She was recently prescribed losartan and had not yet started taking it.      S/p Liver Transplant  - PTA prednisone, sirolimus, ursodiol      Lymphedema  Atrial fibrillation  History CVA  She has lymphedema with trace bilateral lower extremity pitting edema on exam with some wrinkling of the skin.  She has paroxysmal A-fib and is on Eliquis 2.5 mg twice daily and metoprolol succinate 25 mg daily with additional pill in pocket strategy, hydralazine 25 mg daily as needed for SBP greater than 160, Zetia 10 mg daily, and furosemide 20 mg daily.  She has a loop recorder in place.  She had history of multiple tiny likely watershed infarcts in the setting of hypotension in 2001.  -Resume Eliquis, metoprolol, start scheduled hydralazine (until she resumes her home Lasix and losartan, as above), and Zetia     Generalized weakness  Secondary to acute infection.  Lives in independent living facility.  Uses a cane in the home and a walker when she is out.  -PT/SW consulted     Type II DM  PTA on linagliptin 5 mg daily.  A1c was 6.9 in April.  -Resume linagliptin  -Medium dose sliding scale aspart     Chronic back and left knee pain  PTA gabapentin 300 mg at bedtime. Pain regimen PRN.      Hypothyroidism  Previous thyroid cancer and thyroidectomy.  Resume PTA levothyroxine 175 mcg daily.          Diet: Combination Diet Regular Diet    DVT Prophylaxis: DOAC  Ron Catheter: Not  "present  Lines: None     Cardiac Monitoring: None  Code Status: Full Code      Clinically Significant Risk Factors                   # Hypertension: Noted on problem list          # DMII: A1C = 6.9 % (Ref range: <=5.7 %) within past 6 months, PRESENT ON ADMISSION  # Overweight: Estimated body mass index is 29.69 kg/m  as calculated from the following:    Height as of this encounter: 1.702 m (5' 7\").    Weight as of this encounter: 86 kg (189 lb 9.5 oz)., PRESENT ON ADMISSION            Disposition Plan      Expected Discharge Date: 10/14/2024      Destination: home;home with help/services  Discharge Comments: From Independent Living          Dory Ambrosio DO  Hospitalist Service  [facility]  Securely message with [employer] (more info)  Text page via [email or web address] Paging/Directory   ______________________________________________________________________    Interval History   Patient's symptoms are improving    Physical Exam   Vital Signs: Temp: 98.4  F (36.9  C) Temp src: Temporal BP: 117/48 Pulse: 72   Resp: 16 SpO2: 94 % O2 Device: None (Room air)    Weight: 189 lbs 9.53 oz    General: Very pleasant female , NAD  HEENT:  Sclerae anicteric.  Mucous membranes moist.  Cardiac: Regular rate and rhythm without murmur.    Respiratory: Normal work of breathing.  GI:  Abdomen soft, nontender, nondistended.  Musculoskeletal: Moving all extremities appropriately.  Skin: No rashes or abrasions on exposed skin.  Neurologic: Alert. No gross focal deficits.   Psychologic: Appropriate mood and affect.      Medical Decision Making       45 MINUTES SPENT BY ME on the date of service doing chart review, history, exam, documentation & further activities per the note.      Data     I have personally reviewed the following data over the past 24 hrs:    6.7  \   10.4 (L)   / 291     141 104 30.8 (H) /  112 (H)   4.3 23 1.64 (H) \       Imaging results reviewed over the past 24 hrs:   No results found for this or any " previous visit (from the past 24 hour(s)).

## 2024-10-13 NOTE — CONSULTS
"[facility]  INFECTIOUS DISEASES CONSULTATION  Luz Thompson 10/13/2024    History  75 yo woman with history of Liver transplant 2011, CVA, anxiety,  CKD, prior DVT,Coccidioidomycosis, EBV viremia, recent UTI with ESBL producing organism, Klebsiella  oxytoca cultured on 9/22/24 --> therapy with meropenem  Per her, she received 9 total days of therapy between the hospital and home.   Presented to hospital with increased frequency of urination with burning  No fever but some chills  In the ED, her Cr was 1.83, WBC 12.8  UA had >182 wbc  She was started on iv meropenem  UC now with ,000 mixed  louann     ROS: 10 point ROS neg other than the symptoms noted above in the HPI.    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 Gentry Ramirez, 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     Past Surgical History:   Procedure Laterality Date    APPENDECTOMY      CATARACT IOL, RT/LT      RE2013, LE12/10/2013 - Toric lenses    CHOLECYSTECTOMY      COLONOSCOPY  03/10/2014    Procedure: COLONOSCOPY;;  Surgeon: Gentry Ramirez MD;  Location: [facility] GI    CYSTOSCOPY      ear drum repair      ENDOBRONCHIAL ULTRASOUND FLEXIBLE N/A 2017    Procedure: ENDOBRONCHIAL ULTRASOUND FLEXIBLE;  Flexible Bronchoscopy, Endobronchial Ultrasound, Transbronchial Needle Aspiration ;  Surgeon: Eden Clinton MD;  Location: [facility] OR    ENDOSCOPIC RETROGRADE CHOLANGIOPANCREATOGRAM  2013    Procedure: ENDOSCOPIC RETROGRADE CHOLANGIOPANCREATOGRAM;  Endoscopic Retrograde Cholangiopancreatogram with single balloon enteroscopy, ballon sweep of bile duct;  Surgeon: Brett Membreno MD;  Location: [facility] OR     KNEE SCOPE,MED/LAT MENISECTOMY Right 08/10/2012    partial medial menisectomy only    KNEE SURGERY  1966    R knee    PICC INSERTION  2013    4fr SL PASV PICC, 40cm (1cm external) in the R basilic vein w/ tip in the low SVC    PICC INSERTION  2014    5 fr DL BioFlo Navilyst PICC, 46 cm (3 cm external) in the L basilic vein w/ tip in the SVC RA junction.    THYROIDECTOMY  2010    TRANSPLANT LIVER RECIPIENT LIVING UNRELATED  2002     Social History     Social History Narrative    She is retired. She lives in the [facility] over the course of the winter. 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     Family History   Problem Relation Age of Onset    Hypertension Mother     Endometrial Cancer Mother     Hyperlipidemia Mother     Prostate Cancer Father     Macular Degeneration Father     Cancer - colorectal Maternal Grandmother         in her 80's, has surgery and removal of part of kidney,   at age 98    Heart Disease Maternal Grandfather          at 98    Glaucoma Maternal Grandfather     Cerebrovascular Disease Paternal Grandmother         in her 80's    Hypertension Paternal Grandmother     Heart Disease Paternal Grandfather         MI    Alzheimer Disease Paternal Grandfather     Allergies Son     Neurologic Disorder Daughter         Migraines    Breast Cancer Other     Anesthesia Reaction No family hx of     Crohn's Disease No family hx of     Ulcerative Colitis No family hx of           Allergies   Allergen Reactions    Fluconazole Hives and Itching     Full body hives      Mycophenolate Diarrhea and Nausea and Vomiting     Patient stated it was chronic and lasted months      Penicillins Rash, Anaphylaxis, Hives and Itching    Simvastatin Muscle Pain (Myalgia)     severe  Other reaction(s): Myalgia caused by statin    Methotrexate Other (See Comments)     Other reaction(s): Sore  Sores in mouth, esophagus, and stomach.       Morphine And Codeine Itching and Other (See Comments)     Psych disturbance  Other reaction(s): Confusion, Mood alteration    Quinolones Anxiety, Dizziness, Headache, Other (See Comments), Palpitations and Unknown     Other reaction(s): Hyperactive behavior, Lightheadedness, Mood alteration    Dizzy, light headed    Dizziness, shaky, and jumpy    Benadryl [Diphenhydramine Hcl]      Insomnia     Capsules, Empty Gelatin [Gelatin]     Cephalosporins Itching    Ciprofloxacin Hcl Dizziness and Other (See Comments)    Lansoprazole Diarrhea    Azithromycin Itching    Doxycycline Itching and Unknown    Lisinopril Cough    Omeprazole Itching    Tolectin [Nsaids] Rash    Tolmetin Rash and Itching    Tramadol Rash, Hives and Itching     Current Facility-Administered Medications   Medication Dose Route Frequency Provider Last Rate Last Admin    apixaban ANTICOAGULANT (ELIQUIS) tablet 2.5 mg  2.5 mg Oral BID Kenneth Shaw MD   2.5 mg at 10/13/24 0916    calcitRIOL (ROCALTROL)  capsule 0.25 mcg  0.25 mcg Oral Daily Kenneth Shaw MD   0.25 mcg at 10/13/24 0916    calcium carbonate (TUMS) chewable tablet 1,000 mg  1,000 mg Oral 4x Daily PRN Kenneth Shaw MD        glucose gel 15-30 g  15-30 g Oral Q15 Min PRN Kenneth Shaw MD        Or    dextrose 50 % injection 25-50 mL  25-50 mL Intravenous Q15 Min PRN Kenneth Shaw MD        Or    glucagon injection 1 mg  1 mg Subcutaneous Q15 Min PRN Kenneth Shaw MD        ezetimibe (ZETIA) tablet 10 mg  10 mg Oral Daily Kenneth Shaw MD   10 mg at 10/13/24 0916    ferrous sulfate (FEROSUL) tablet 325 mg  325 mg Oral BID Kenneth Shaw MD   325 mg at 10/13/24 0916    folic acid (FOLVITE) tablet 1 mg  1 mg Oral Daily Kenneth Shaw MD   1 mg at 10/13/24 0916    [Held by provider] furosemide (LASIX) tablet 20 mg  20 mg Oral Daily Kenneth Shaw MD        gabapentin (NEURONTIN) solution 300 mg  300 mg Oral At Bedtime Kenneth Shaw MD   300 mg at 10/12/24 2100    hydrALAZINE (APRESOLINE) tablet 10 mg  10 mg Oral Q8H LUZMARIA Kenneth Shaw MD   10 mg at 10/13/24 1429    insulin aspart (NovoLOG) injection (RAPID ACTING)  1-7 Units Subcutaneous TID AC Kenneth Shaw MD        insulin aspart (NovoLOG) injection (RAPID ACTING)  1-5 Units Subcutaneous At Bedtime Kenneth Shaw MD        levothyroxine (SYNTHROID/LEVOTHROID) tablet 175 mcg  175 mcg Oral Daily Kenneth Shaw MD   175 mcg at 10/13/24 0814    lidocaine (LMX4) cream   Topical Q1H PRN Kenneth Shaw MD        lidocaine 1 % 0.1-1 mL  0.1-1 mL Other Q1H PRN Kenneth Shaw MD        [Held by provider] losartan (COZAAR) tablet 25 mg  25 mg Oral Daily Kenneth Shaw MD        meclizine (ANTIVERT) tablet 25 mg  25 mg Oral TID PRN Dory Ambrosio,         meropenem (MERREM) 1 g vial to attach to  mL bag  1 g Intravenous Q12H Kenneth Shaw MD   1 g at 10/13/24 1422    metoprolol succinate ER (TOPROL XL) 24 hr tablet 25 mg  25 mg  "Oral Daily Kenneth Shaw MD   25 mg at 10/13/24 0916    nystatin (MYCOSTATIN) cream   Topical BID Dory Ambrosio DO   Given at 10/13/24 0917    ondansetron (ZOFRAN ODT) ODT tab 4 mg  4 mg Oral Q6H PRN Kenneth Shaw MD        Or    ondansetron (ZOFRAN) injection 4 mg  4 mg Intravenous Q6H PRN Kenneth Shaw MD        Patient is already receiving anticoagulation with heparin, enoxaparin (LOVENOX), warfarin (COUMADIN)  or other anticoagulant medication   Does not apply Continuous PRN Kenneth Shaw MD        predniSONE (DELTASONE) tablet 10 mg  10 mg Oral Daily Kenneth Shaw MD   10 mg at 10/13/24 0916    prochlorperazine (COMPAZINE) injection 5 mg  5 mg Intravenous Q6H PRN Kenneth Shaw MD        Or    prochlorperazine (COMPAZINE) tablet 5 mg  5 mg Oral Q6H PRN Kenneth Shaw MD        Or    prochlorperazine (COMPAZINE) suppository 12.5 mg  12.5 mg Rectal Q12H PRN Kenneth Shaw MD        senna-docusate (SENOKOT-S/PERICOLACE) 8.6-50 MG per tablet 1 tablet  1 tablet Oral BID PRN Kenneth Sahw MD        Or    senna-docusate (SENOKOT-S/PERICOLACE) 8.6-50 MG per tablet 2 tablet  2 tablet Oral BID PRN Kenneth Shaw MD        sirolimus (GENERIC EQUIVALENT) tablet 1 mg  1 mg Oral Daily Dory Ambrosio DO   1 mg at 10/13/24 0916    sitagliptin (JANUVIA) tablet 50 mg  50 mg Oral Daily Kenneth Shaw MD   50 mg at 10/13/24 0916    sodium chloride (PF) 0.9% PF flush 3 mL  3 mL Intracatheter Q8H Kenneth Shaw MD   3 mL at 10/13/24 0917    sodium chloride (PF) 0.9% PF flush 3 mL  3 mL Intracatheter q1 min prn Kenneth Shaw MD        ursodiol (ACTIGALL) tablet 250 mg  250 mg Oral BID Kenneth Shaw MD   250 mg at 10/13/24 0916           Physical Examination  /48 (BP Location: Left arm)   Pulse 72   Temp 98.4  F (36.9  C) (Temporal)   Resp 16   Ht 1.702 m (5' 7\")   Wt 86 kg (189 lb 9.5 oz)   LMP 06/01/1988 (Approximate)   SpO2 94%   BMI 29.69 kg/m  " "  HEENT:, scleral anicteric , no scleral hemorrhages,   Op clear  Neck supple, no JOSEPH  CV: RRR no m/r/g  Back: + R CVAT  Lungs CTA bilat  Abd soft, NT, ND  Ext; no c/c/e, no splinter hemorrhages or nodes    LABORATORY DATA  Lab Results   Component Value Date    WBC 6.7 10/13/2024    WBC 7.1 09/18/2020     Lab Results   Component Value Date    RBC 3.51 10/13/2024    RBC 3.80 09/18/2020     Lab Results   Component Value Date    HGB 10.4 10/13/2024    HGB 11.2 09/18/2020     Lab Results   Component Value Date    HCT 33.8 10/13/2024    HCT 35.0 09/18/2020     No components found for: \"MCT\"  Lab Results   Component Value Date    MCV 96 10/13/2024    MCV 92 09/18/2020     Lab Results   Component Value Date    MCH 29.6 10/13/2024    MCH 29.5 09/18/2020     Lab Results   Component Value Date    MCHC 30.8 10/13/2024    MCHC 32.0 09/18/2020     Lab Results   Component Value Date    RDW 15.0 10/13/2024    RDW 17.2 09/18/2020     Lab Results   Component Value Date     10/13/2024     09/18/2020     Last Comprehensive Metabolic Panel:  Sodium   Date Value Ref Range Status   10/13/2024 141 135 - 145 mmol/L Final   09/18/2020 141 mmol/L Final     Potassium   Date Value Ref Range Status   10/13/2024 4.3 3.4 - 5.3 mmol/L Final   06/24/2022 4.0 3.4 - 5.3 mmol/L Final   09/18/2020 4.4 mmol/L Final     Chloride   Date Value Ref Range Status   10/13/2024 104 98 - 107 mmol/L Final   09/18/2020 105 mmol/L Final     Chloride (External)   Date Value Ref Range Status   12/05/2022 98 95 - 109 mmol/L Final     Carbon Dioxide   Date Value Ref Range Status   09/18/2020 29 mmol/L Final     Carbon Dioxide (CO2)   Date Value Ref Range Status   10/13/2024 23 22 - 29 mmol/L Final   06/24/2022 28 20 - 32 mmol/L Final     Anion Gap   Date Value Ref Range Status   10/13/2024 14 7 - 15 mmol/L Final   06/24/2022 9 3 - 14 mmol/L Final   08/30/2019 6 3 - 14 mmol/L Final     Glucose   Date Value Ref Range Status   10/13/2024 112 (H) 70 - 99 " mg/dL Final   06/24/2022 186 (H) 70 - 99 mg/dL Final   09/18/2020 151 (A) 70 - 99 mg/dL Final     GLUCOSE BY METER POCT   Date Value Ref Range Status   10/13/2024 106 (H) 70 - 99 mg/dL Final     Urea Nitrogen   Date Value Ref Range Status   10/13/2024 30.8 (H) 8.0 - 23.0 mg/dL Final   06/24/2022 44 (H) 7 - 30 mg/dL Final   09/18/2020 19 mg/dL Final     Creatinine   Date Value Ref Range Status   10/13/2024 1.64 (H) 0.51 - 0.95 mg/dL Final   09/18/2020 1.3 mg/dL Final     GFR Estimate   Date Value Ref Range Status   10/13/2024 32 (L) >60 mL/min/1.73m2 Final     Comment:     eGFR calculated using 2021 CKD-EPI equation.   09/18/2020 44.49 L ml/min/1.73m2 Final     Comment:     >60     Calcium   Date Value Ref Range Status   10/13/2024 9.4 8.8 - 10.4 mg/dL Final     Comment:     Reference intervals for this test were updated on 7/16/2024 to reflect our healthy population more accurately. There may be differences in the flagging of prior results with similar values performed with this method. Those prior results can be interpreted in the context of the updated reference intervals.   09/18/2020 8.8 mg/dL Final     Bilirubin Total   Date Value Ref Range Status   09/22/2024 0.8 <=1.2 mg/dL Final   09/18/2020 0.3 mg/dL Final     Alkaline Phosphatase   Date Value Ref Range Status   09/22/2024 146 40 - 150 U/L Final   09/18/2020 152 H U/L Final     Comment:          ALT   Date Value Ref Range Status   09/30/2024 26 0 - 50 U/L Final   09/18/2020 78 H U/L Final     Comment:     7-52     AST   Date Value Ref Range Status   09/30/2024 21 0 - 45 U/L Final   09/18/2020 55 H U/L Final     Comment:     12-40       MICROBIOLOGY    10/11/24 BC x 1 NGTD    10/11/24 UC  ,000 mixed louann    IMPRESSION AND PLAN  73 yo woman s/p Liver transplant in the past on immune suppression  ESBL pyelonephritis 3 weeks ago, s/p 9 d of therapy  Now back with chills, dysuria, frequency,     Pyelonephritis R kidney / probable recurrent/  under-treated ESBL Klebsiella  2.   Leukocytosis  3. Liver transplant on immune suppression      Her Klebsiealla oxytoca from 9/22/24 was ESBL producing  At the time, she had a WBC of 17,000   She reports 9 days of therapy with meropenem   Now back with signs and symptoms of UTI with moderate pyuria and a significantly tender R kidney on exam  I suspect she never fully cleared the prior infection with 9 d of therapy and now has recurrence of infection but the UC was contaminated with her louann and may still be affected by her prior recent therapy    Recommendations  Continue the meropenem 1 g q 12 hrs  In her, I favor staying with q 12 hr meropenem x 14 days at least. Would use this instead of erta due to less side effect profile and her good tolerance of missy now and before      Thank you very much for this consultation      Antoine Vann MD

## 2024-10-13 NOTE — PLAN OF CARE
"Goal Outcome Evaluation:      Plan of Care Reviewed With: patient    Overall Patient Progress: improvingOverall Patient Progress: improving    Outcome Evaluation: Pt is alert and oriented x4, assist of 1 with trnsfers. no UTI sx reported. Pt had x1 soft stool, no loose stools this night. Urine culture final result   50,000-100,000 CFU/mL Mixture of Urogenital Rashmi. Pt continues on Merrem. D/c plan TBD.      Problem: Adult Inpatient Plan of Care  Goal: Plan of Care Review  Description: The Plan of Care Review/Shift note should be completed every shift.  The Outcome Evaluation is a brief statement about your assessment that the patient is improving, declining, or no change.  This information will be displayed automatically on your shift  note.  Outcome: Progressing  Flowsheets (Taken 10/13/2024 0634)  Outcome Evaluation: Pt is alert and oriented x4, assist of 1 with trnsfers. no UTI sx reported. Pt had x1 soft stool, no loose stools this night. Urine culture final result   50,000-100,000 CFU/mL Mixture of Urogenital Rashmi. Pt continues on Merrem. D/c plan TBD.  Plan of Care Reviewed With: patient  Overall Patient Progress: improving  Goal: Patient-Specific Goal (Individualized)  Description: You can add care plan individualizations to a care plan. Examples of Individualization might be:  \"Parent requests to be called daily at 9am for status\", \"I have a hard time hearing out of my right ear\", or \"Do not touch me to wake me up as it startles  me\".  Outcome: Progressing  Goal: Absence of Hospital-Acquired Illness or Injury  Outcome: Progressing  Intervention: Identify and Manage Fall Risk  Recent Flowsheet Documentation  Taken 10/13/2024 0134 by Deborah Bennett, RN  Safety Promotion/Fall Prevention:   activity supervised   assistive device/personal items within reach   clutter free environment maintained   mobility aid in reach   nonskid shoes/slippers when out of bed   patient and family education   safety round/check " completed  Taken 10/12/2024 2201 by Deborah Bennett RN  Safety Promotion/Fall Prevention:   activity supervised   assistive device/personal items within reach   clutter free environment maintained   mobility aid in reach   nonskid shoes/slippers when out of bed   patient and family education   safety round/check completed  Intervention: Prevent Skin Injury  Recent Flowsheet Documentation  Taken 10/13/2024 0134 by Deborah Bennett RN  Body Position: position changed independently  Skin Protection: adhesive use limited  Device Skin Pressure Protection: adhesive use limited  Taken 10/12/2024 2201 by Deborah Bennett RN  Body Position: position changed independently  Skin Protection: adhesive use limited  Device Skin Pressure Protection: adhesive use limited  Intervention: Prevent and Manage VTE (Venous Thromboembolism) Risk  Recent Flowsheet Documentation  Taken 10/13/2024 0134 by Deborah Bennett RN  VTE Prevention/Management: SCDs off (sequential compression devices)  Taken 10/12/2024 2201 by Deborah Bennett RN  VTE Prevention/Management: SCDs off (sequential compression devices)  Intervention: Prevent Infection  Recent Flowsheet Documentation  Taken 10/13/2024 0134 by Deborah Bennett RN  Infection Prevention:   rest/sleep promoted   single patient room provided  Taken 10/12/2024 2201 by Deborah Bennett RN  Infection Prevention:   rest/sleep promoted   single patient room provided  Goal: Optimal Comfort and Wellbeing  Outcome: Progressing  Goal: Readiness for Transition of Care  Outcome: Progressing     Problem: Pain Acute  Goal: Optimal Pain Control and Function  Outcome: Progressing  Intervention: Prevent or Manage Pain  Recent Flowsheet Documentation  Taken 10/13/2024 0134 by Deborah Bennett RN  Medication Review/Management: medications reviewed  Taken 10/12/2024 2201 by Deborah Bennett RN  Medication Review/Management: medications reviewed     Problem: Fall Injury Risk  Goal: Absence of Fall and Fall-Related Injury  Outcome:  Progressing  Intervention: Identify and Manage Contributors  Recent Flowsheet Documentation  Taken 10/13/2024 0134 by Deborah Bennett RN  Medication Review/Management: medications reviewed  Taken 10/12/2024 2201 by Deborah Bennett RN  Medication Review/Management: medications reviewed  Intervention: Promote Injury-Free Environment  Recent Flowsheet Documentation  Taken 10/13/2024 0134 by Deborah Bennett RN  Safety Promotion/Fall Prevention:   activity supervised   assistive device/personal items within reach   clutter free environment maintained   mobility aid in reach   nonskid shoes/slippers when out of bed   patient and family education   safety round/check completed  Taken 10/12/2024 2201 by Deborah Bennett RN  Safety Promotion/Fall Prevention:   activity supervised   assistive device/personal items within reach   clutter free environment maintained   mobility aid in reach   nonskid shoes/slippers when out of bed   patient and family education   safety round/check completed     Problem: Infection  Goal: Absence of Infection Signs and Symptoms  Outcome: Progressing  Intervention: Prevent or Manage Infection  Recent Flowsheet Documentation  Taken 10/13/2024 0134 by Deborah Bennett RN  Infection Management: aseptic technique maintained  Isolation Precautions: contact precautions maintained  Taken 10/12/2024 2201 by Deborah Bennett RN  Infection Management: aseptic technique maintained  Isolation Precautions: contact precautions maintained     Problem: Comorbidity Management  Goal: Blood Glucose Levels Within Targeted Range  Outcome: Progressing  Intervention: Monitor and Manage Glycemia  Recent Flowsheet Documentation  Taken 10/13/2024 0134 by Deborah Bennett RN  Medication Review/Management: medications reviewed  Taken 10/12/2024 2201 by Deborah Bennett RN  Medication Review/Management: medications reviewed  Goal: Blood Pressure in Desired Range  Outcome: Progressing  Intervention: Maintain Blood Pressure Management  Recent  Flowsheet Documentation  Taken 10/13/2024 0134 by Deborah Bennett, RN  Medication Review/Management: medications reviewed  Taken 10/12/2024 2201 by Deborah Bennett, RN  Medication Review/Management: medications reviewed

## 2024-10-13 NOTE — PLAN OF CARE
"Goal Outcome Evaluation:      Plan of Care Reviewed With: patient    Overall Patient Progress: improvingOverall Patient Progress: improving    Outcome Evaluation: Seen by ID today. Plan for 2 weeks of IV antbx. Midline required. Blood cultures show no growth after 1 day.      Problem: Adult Inpatient Plan of Care  Goal: Plan of Care Review  Description: The Plan of Care Review/Shift note should be completed every shift.  The Outcome Evaluation is a brief statement about your assessment that the patient is improving, declining, or no change.  This information will be displayed automatically on your shift  note.  Outcome: Progressing  Flowsheets (Taken 10/13/2024 1746)  Outcome Evaluation: Seen by ID today. Plan for 2 weeks of IV antbx. Midline required. Blood cultures show no growth after 1 day.  Plan of Care Reviewed With: patient  Overall Patient Progress: improving  Goal: Patient-Specific Goal (Individualized)  Description: You can add care plan individualizations to a care plan. Examples of Individualization might be:  \"Parent requests to be called daily at 9am for status\", \"I have a hard time hearing out of my right ear\", or \"Do not touch me to wake me up as it startles  me\".  Outcome: Progressing  Goal: Absence of Hospital-Acquired Illness or Injury  Outcome: Progressing  Intervention: Identify and Manage Fall Risk  Recent Flowsheet Documentation  Taken 10/13/2024 0823 by Duong Osorio RN  Safety Promotion/Fall Prevention:   activity supervised   clutter free environment maintained   mobility aid in reach   nonskid shoes/slippers when out of bed   room near nurse's station   safety round/check completed   supervised activity   toileting scheduled  Intervention: Prevent and Manage VTE (Venous Thromboembolism) Risk  Recent Flowsheet Documentation  Taken 10/13/2024 0823 by Duong Osorio RN  VTE Prevention/Management: SCDs off (sequential compression devices)  Intervention: Prevent Infection  Recent " Flowsheet Documentation  Taken 10/13/2024 0823 by Duong Osorio RN  Infection Prevention:   single patient room provided   rest/sleep promoted   hand hygiene promoted  Goal: Optimal Comfort and Wellbeing  Outcome: Progressing  Goal: Readiness for Transition of Care  Outcome: Progressing     Problem: Pain Acute  Goal: Optimal Pain Control and Function  Outcome: Progressing     Problem: Fall Injury Risk  Goal: Absence of Fall and Fall-Related Injury  Outcome: Progressing  Intervention: Promote Injury-Free Environment  Recent Flowsheet Documentation  Taken 10/13/2024 0823 by Duong Osorio RN  Safety Promotion/Fall Prevention:   activity supervised   clutter free environment maintained   mobility aid in reach   nonskid shoes/slippers when out of bed   room near nurse's station   safety round/check completed   supervised activity   toileting scheduled     Problem: Infection  Goal: Absence of Infection Signs and Symptoms  Outcome: Progressing  Intervention: Prevent or Manage Infection  Recent Flowsheet Documentation  Taken 10/13/2024 0823 by Duong Osorio RN  Isolation Precautions: contact precautions maintained     Problem: Comorbidity Management  Goal: Blood Glucose Levels Within Targeted Range  Outcome: Progressing  Goal: Blood Pressure in Desired Range  Outcome: Progressing

## 2024-10-14 LAB
ALBUMIN UR-MCNC: 10 MG/DL
APPEARANCE UR: CLEAR
BILIRUB UR QL STRIP: NEGATIVE
COLOR UR AUTO: ABNORMAL
GLUCOSE BLDC GLUCOMTR-MCNC: 100 MG/DL (ref 70–99)
GLUCOSE BLDC GLUCOMTR-MCNC: 108 MG/DL (ref 70–99)
GLUCOSE BLDC GLUCOMTR-MCNC: 115 MG/DL (ref 70–99)
GLUCOSE BLDC GLUCOMTR-MCNC: 145 MG/DL (ref 70–99)
GLUCOSE BLDC GLUCOMTR-MCNC: 190 MG/DL (ref 70–99)
GLUCOSE UR STRIP-MCNC: NEGATIVE MG/DL
HGB UR QL STRIP: NEGATIVE
KETONES UR STRIP-MCNC: NEGATIVE MG/DL
LEUKOCYTE ESTERASE UR QL STRIP: NEGATIVE
MUCOUS THREADS #/AREA URNS LPF: PRESENT /LPF
NITRATE UR QL: NEGATIVE
PH UR STRIP: 5.5 [PH] (ref 5–7)
RBC URINE: 1 /HPF
SP GR UR STRIP: 1.01 (ref 1–1.03)
SQUAMOUS EPITHELIAL: 1 /HPF
UROBILINOGEN UR STRIP-MCNC: NORMAL MG/DL
WBC URINE: 5 /HPF

## 2024-10-14 PROCEDURE — 272N000748 HC KIT, CATH 3FR OR 4FR SINGLE LUMEN POWERMIDLINE

## 2024-10-14 PROCEDURE — 250N000012 HC RX MED GY IP 250 OP 636 PS 637: Performed by: STUDENT IN AN ORGANIZED HEALTH CARE EDUCATION/TRAINING PROGRAM

## 2024-10-14 PROCEDURE — 36569 INSJ PICC 5 YR+ W/O IMAGING: CPT

## 2024-10-14 PROCEDURE — 81001 URINALYSIS AUTO W/SCOPE: CPT | Performed by: INTERNAL MEDICINE

## 2024-10-14 PROCEDURE — 99232 SBSQ HOSP IP/OBS MODERATE 35: CPT | Performed by: INTERNAL MEDICINE

## 2024-10-14 PROCEDURE — 250N000012 HC RX MED GY IP 250 OP 636 PS 637: Performed by: INTERNAL MEDICINE

## 2024-10-14 PROCEDURE — 250N000011 HC RX IP 250 OP 636: Performed by: INTERNAL MEDICINE

## 2024-10-14 PROCEDURE — 99232 SBSQ HOSP IP/OBS MODERATE 35: CPT | Performed by: STUDENT IN AN ORGANIZED HEALTH CARE EDUCATION/TRAINING PROGRAM

## 2024-10-14 PROCEDURE — 120N000001 HC R&B MED SURG/OB

## 2024-10-14 PROCEDURE — 250N000013 HC RX MED GY IP 250 OP 250 PS 637: Performed by: INTERNAL MEDICINE

## 2024-10-14 RX ORDER — LIDOCAINE 40 MG/G
CREAM TOPICAL
Status: DISCONTINUED | OUTPATIENT
Start: 2024-10-14 | End: 2024-10-15 | Stop reason: HOSPADM

## 2024-10-14 RX ADMIN — GABAPENTIN 300 MG: 250 SOLUTION ORAL at 21:19

## 2024-10-14 RX ADMIN — OMEGA-3-ACID ETHYL ESTERS 1 CAPSULE: 1 CAPSULE, LIQUID FILLED ORAL at 08:36

## 2024-10-14 RX ADMIN — SITAGLIPTIN 50 MG: 50 TABLET, FILM COATED ORAL at 08:31

## 2024-10-14 RX ADMIN — MEROPENEM 1 G: 1 INJECTION, POWDER, FOR SOLUTION INTRAVENOUS at 14:01

## 2024-10-14 RX ADMIN — LEVOTHYROXINE SODIUM 175 MCG: 0.03 TABLET ORAL at 08:31

## 2024-10-14 RX ADMIN — FERROUS SULFATE TAB 325 MG (65 MG ELEMENTAL FE) 325 MG: 325 (65 FE) TAB at 08:31

## 2024-10-14 RX ADMIN — PREDNISONE 10 MG: 5 TABLET ORAL at 08:31

## 2024-10-14 RX ADMIN — URSODIOL 250 MG: 250 TABLET ORAL at 08:31

## 2024-10-14 RX ADMIN — NYSTATIN: 100000 CREAM TOPICAL at 20:28

## 2024-10-14 RX ADMIN — URSODIOL 250 MG: 250 TABLET ORAL at 20:27

## 2024-10-14 RX ADMIN — SIROLIMUS 1 MG: 0.5 TABLET, FILM COATED ORAL at 08:31

## 2024-10-14 RX ADMIN — HYDRALAZINE HYDROCHLORIDE 10 MG: 10 TABLET ORAL at 07:05

## 2024-10-14 RX ADMIN — MEROPENEM 1 G: 1 INJECTION, POWDER, FOR SOLUTION INTRAVENOUS at 02:37

## 2024-10-14 RX ADMIN — METOPROLOL SUCCINATE 25 MG: 25 TABLET, EXTENDED RELEASE ORAL at 08:31

## 2024-10-14 RX ADMIN — CALCITRIOL CAPSULES 0.25 MCG 0.25 MCG: 0.25 CAPSULE ORAL at 08:31

## 2024-10-14 RX ADMIN — FERROUS SULFATE TAB 325 MG (65 MG ELEMENTAL FE) 325 MG: 325 (65 FE) TAB at 20:27

## 2024-10-14 RX ADMIN — HYDRALAZINE HYDROCHLORIDE 10 MG: 10 TABLET ORAL at 14:01

## 2024-10-14 RX ADMIN — EZETIMIBE 10 MG: 10 TABLET ORAL at 08:31

## 2024-10-14 RX ADMIN — FOLIC ACID 1 MG: 1 TABLET ORAL at 08:31

## 2024-10-14 RX ADMIN — APIXABAN 2.5 MG: 2.5 TABLET, FILM COATED ORAL at 08:31

## 2024-10-14 RX ADMIN — APIXABAN 2.5 MG: 2.5 TABLET, FILM COATED ORAL at 20:27

## 2024-10-14 RX ADMIN — OMEGA-3-ACID ETHYL ESTERS 1 CAPSULE: 1 CAPSULE, LIQUID FILLED ORAL at 20:28

## 2024-10-14 RX ADMIN — NYSTATIN: 100000 CREAM TOPICAL at 08:39

## 2024-10-14 ASSESSMENT — ACTIVITIES OF DAILY LIVING (ADL)
ADLS_ACUITY_SCORE: 36

## 2024-10-14 NOTE — PLAN OF CARE
"Goal Outcome Evaluation:      Plan of Care Reviewed With: patient    Overall Patient Progress: improvingOverall Patient Progress: improving    Outcome Evaluation: Pt is alert and oriented x4, SBA with transfers. denies pain. SL. Merrem given per order. BG at 2 am 115. Plan for midline.      Problem: Adult Inpatient Plan of Care  Goal: Plan of Care Review  Description: The Plan of Care Review/Shift note should be completed every shift.  The Outcome Evaluation is a brief statement about your assessment that the patient is improving, declining, or no change.  This information will be displayed automatically on your shift  note.  Outcome: Progressing  Flowsheets (Taken 10/14/2024 0643)  Outcome Evaluation: Pt is alert and oriented x4, SBA with transfers. denies pain. SL. Merrem given per order. BG at 2 am 115. Plan for midline.  Plan of Care Reviewed With: patient  Overall Patient Progress: improving  Goal: Patient-Specific Goal (Individualized)  Description: You can add care plan individualizations to a care plan. Examples of Individualization might be:  \"Parent requests to be called daily at 9am for status\", \"I have a hard time hearing out of my right ear\", or \"Do not touch me to wake me up as it startles  me\".  Outcome: Progressing  Goal: Absence of Hospital-Acquired Illness or Injury  Outcome: Progressing  Intervention: Identify and Manage Fall Risk  Recent Flowsheet Documentation  Taken 10/13/2024 2322 by Deborah Bennett, RN  Safety Promotion/Fall Prevention:   activity supervised   clutter free environment maintained   mobility aid in reach   nonskid shoes/slippers when out of bed   room near nurse's station   safety round/check completed   supervised activity   toileting scheduled  Intervention: Prevent and Manage VTE (Venous Thromboembolism) Risk  Recent Flowsheet Documentation  Taken 10/13/2024 2322 by Deborah Bennett, RN  VTE Prevention/Management: SCDs off (sequential compression devices)  Intervention: Prevent " Infection  Recent Flowsheet Documentation  Taken 10/13/2024 2322 by Deborah Bennett, RN  Infection Prevention:   single patient room provided   rest/sleep promoted   hand hygiene promoted  Goal: Optimal Comfort and Wellbeing  Outcome: Progressing  Goal: Readiness for Transition of Care  Outcome: Progressing     Problem: Pain Acute  Goal: Optimal Pain Control and Function  Outcome: Progressing     Problem: Fall Injury Risk  Goal: Absence of Fall and Fall-Related Injury  Outcome: Progressing  Intervention: Promote Injury-Free Environment  Recent Flowsheet Documentation  Taken 10/13/2024 2322 by Deborah Bennett, RN  Safety Promotion/Fall Prevention:   activity supervised   clutter free environment maintained   mobility aid in reach   nonskid shoes/slippers when out of bed   room near nurse's station   safety round/check completed   supervised activity   toileting scheduled     Problem: Infection  Goal: Absence of Infection Signs and Symptoms  Outcome: Progressing  Intervention: Prevent or Manage Infection  Recent Flowsheet Documentation  Taken 10/13/2024 2322 by Deborah Bennett, RN  Isolation Precautions: contact precautions maintained     Problem: Comorbidity Management  Goal: Blood Glucose Levels Within Targeted Range  Outcome: Progressing  Goal: Blood Pressure in Desired Range  Outcome: Progressing

## 2024-10-14 NOTE — PROGRESS NOTES
[facility]  Infectious Disease Progress Note          Assessment and Plan:   IMPRESSION AND PLAN  73 yo woman s/p Liver transplant in the past on immune suppression  ESBL pyelonephritis 3 weeks ago, s/p 9 d of therapy  Now back with chills, dysuria, frequency,      Pyelonephritis R kidney / probable recurrent/ under-treated ESBL Klebsiella  2.   Leukocytosis  3. Liver transplant on immune suppression        Challenging problem, patient is improved on meropenem, urine culture had mixed louann so not being worked up, and the background is a concern about previous Pseudomonas bacteremia that had unclear source (blood culture was negative for that), as it turned out not treated but clinically improved and follow-up cultures are negative so we elected to simply watch.     Recommendations  Continue the meropenem 1 g q 12 hrs  I still have concern about the prior Pseudomonas but she is clinically improved without treating that again now, repeat urinalysis and urine culture on the meropenem see if we grow anything, if we grow the prior Pseudomonas probably need to treat it, if we do not grow anything on that probably go with the plan of a 2-week course of IV meropenem which she did at home self-pay previously        Interval History:     no new complaints mostly feels better temp is down, no positive microbiology no ongoing fever or major urinary symptoms.  Urinalysis was abnormal urine culture with mixed louann I suspect either the ESBL organism or possibly the previous Pseudomonas are the cause here repeat urinalysis and urine culture pending on antibiotics              Medications:     Current Facility-Administered Medications   Medication Dose Route Frequency Provider Last Rate Last Admin    apixaban ANTICOAGULANT (ELIQUIS) tablet 2.5 mg  2.5 mg Oral BID Kenneth Shaw MD   2.5 mg at 10/14/24 0831    calcitRIOL (ROCALTROL) capsule 0.25 mcg  0.25 mcg Oral Daily Kenneth Shaw MD   0.25 mcg  at 10/14/24 0831    ezetimibe (ZETIA) tablet 10 mg  10 mg Oral Daily Kenneth Shaw MD   10 mg at 10/14/24 0831    ferrous sulfate (FEROSUL) tablet 325 mg  325 mg Oral BID Kenneth Shaw MD   325 mg at 10/14/24 0831    folic acid (FOLVITE) tablet 1 mg  1 mg Oral Daily Kenneth Shaw MD   1 mg at 10/14/24 0831    [Held by provider] furosemide (LASIX) tablet 20 mg  20 mg Oral Daily Kenneth Shaw MD        gabapentin (NEURONTIN) solution 300 mg  300 mg Oral At Bedtime Kenneth Shaw MD   300 mg at 10/13/24 2154    hydrALAZINE (APRESOLINE) tablet 10 mg  10 mg Oral Q8H LUZMARIA Kenneth Shaw MD   10 mg at 10/14/24 0705    insulin aspart (NovoLOG) injection (RAPID ACTING)  1-7 Units Subcutaneous TID AC Kenneth Shaw MD        insulin aspart (NovoLOG) injection (RAPID ACTING)  1-5 Units Subcutaneous At Bedtime Kenneth Shaw MD        levothyroxine (SYNTHROID/LEVOTHROID) tablet 175 mcg  175 mcg Oral Daily Kenneth Shaw MD   175 mcg at 10/14/24 0831    [Held by provider] losartan (COZAAR) tablet 25 mg  25 mg Oral Daily Kenneth Shaw MD        meropenem (MERREM) 1 g vial to attach to  mL bag  1 g Intravenous Q12H Kenneth Shaw MD   1 g at 10/14/24 0237    metoprolol succinate ER (TOPROL XL) 24 hr tablet 25 mg  25 mg Oral Daily Kenneth Shaw MD   25 mg at 10/14/24 0831    nystatin (MYCOSTATIN) cream   Topical BID Dory Ambrosio DO   Given at 10/14/24 0839    omega-3 acid ethyl esters (LOVAZA) capsule 1 capsule  1 capsule Oral BID Ha Arias MD   1 capsule at 10/14/24 0836    predniSONE (DELTASONE) tablet 10 mg  10 mg Oral Daily Kenneth Shaw MD   10 mg at 10/14/24 0831    sirolimus (GENERIC EQUIVALENT) tablet 1 mg  1 mg Oral Daily Dory Ambrosio DO   1 mg at 10/14/24 0831    sitagliptin (JANUVIA) tablet 50 mg  50 mg Oral Daily Kenneth Shaw MD   50 mg at 10/14/24 0831    sodium chloride (PF) 0.9% PF flush 10 mL  10 mL Intracatheter Q8H  "Dory Ambrosio, DO        sodium chloride (PF) 0.9% PF flush 3 mL  3 mL Intracatheter Q8H Kenneth Shaw MD   3 mL at 10/14/24 0830    ursodiol (ACTIGALL) tablet 250 mg  250 mg Oral BID Kenneth Shaw MD   250 mg at 10/14/24 0831                  Physical Exam:   Blood pressure (!) 140/53, pulse 61, temperature 96.9  F (36.1  C), temperature source Temporal, resp. rate 16, height 1.702 m (5' 7\"), weight 86 kg (189 lb 9.5 oz), last menstrual period 06/01/1988, SpO2 98%, not currently breastfeeding.  Wt Readings from Last 2 Encounters:   10/13/24 86 kg (189 lb 9.5 oz)   10/01/24 81.2 kg (179 lb)     Vital Signs with Ranges  Temp:  [96.9  F (36.1  C)-98.4  F (36.9  C)] 96.9  F (36.1  C)  Pulse:  [61-72] 61  Resp:  [16] 16  BP: (117-142)/(48-62) 140/53  SpO2:  [93 %-98 %] 98 %    Constitutional: Awake, alert, cooperative, no apparent distress     Lungs: Clear to auscultation bilaterally, no crackles or wheezing   Cardiovascular: Regular rate and rhythm, normal S1 and S2, and no murmur noted   Abdomen: Normal bowel sounds, soft, non-distended, non-tender   Skin: No rashes, no cyanosis, no edema   Other:           Data:   All microbiology laboratory data reviewed.  Recent Labs   Lab Test 10/13/24  1100 10/12/24  0656 10/11/24  2212   WBC 6.7 7.9 12.8*   HGB 10.4* 10.4* 12.0   HCT 33.8* 32.6* 37.2   MCV 96 93 93    307 360     Recent Labs   Lab Test 10/13/24  1100 10/12/24  0656 10/11/24  2212   CR 1.64* 1.70* 1.83*     Recent Labs   Lab Test 08/26/19  2331   SED 78*     Recent Labs   Lab Test 08/30/19  0657 08/29/19  0544 08/28/19  1718 08/28/19  1710 08/27/19  0224 08/27/19  0150 08/26/19  2331 09/27/18  1912 07/30/18  0852   CULT No growth No growth No growth No growth 10,000 to 50,000 colonies/mL  Escherichia coli  * Cultured on the 1st day of incubation:  Escherichia coli  Susceptibility testing done on previous specimen  *  Critical Value/Significant Value, preliminary result only, called to and read " back by   Maria Teresa Martines RN 08/27/2019 @1425 dk/hdp   Cultured on the 1st day of incubation:  Escherichia coli  *  Critical Value/Significant Value, preliminary result only, called to and read back by  NENO Robert at 5169 [id number]    (Note)  POSITIVE for E.COLI by Verigene multiplex nucleic acid test. Final  identification and antimicrobial susceptibility testing will be  verified by standard methods. Verigene test will not distinguish  E.coli from Shigella species including S.dysenteriae, S.flexneri,  S.boydii, and S.sonnei. Specimens containing Shigella species or  E.coli will be reported as Positive for E.coli.    Specimen tested with Verigene multiplex, gram-negative blood culture  nucleic acid test for the following targets: Acinetobacter sp.,  Citrobacter sp., Enterobacter sp., Proteus sp., E. coli, K.  pneumoniae/oxytoca, P. aeruginosa, and the following resistance  markers: CTXM, KPC, NDM, VIM, IMP and OXA.    Critical Value/Significant Value called to and read back by  Flower Lujan Rn @ 6138 8.27.19 CS     Moderate growth  Staphylococcus aureus  * 50,000 to 100,000 colonies/mL  mixed urogenital louann  Susceptibility testing not routinely done

## 2024-10-14 NOTE — CONSULTS
Care Management Follow Up    Length of Stay (days): 3    Expected Discharge Date: 10/15/2024     Concerns to be Addressed: discharge planning     Patient plan of care discussed at interdisciplinary rounds: Yes    Anticipated Discharge Disposition:  home    Anticipated Discharge Services:  home infusion, homecare  Anticipated Discharge DME:  none    Education Provided on the Discharge Plan:  yes  Patient/Family in Agreement with the Plan:  yes    Referrals Placed by CM/SW:  home care  Private pay costs discussed: insurance, out of pocket home infusion costs    Discussed  Partnership in Safe Discharge Planning  document with patient/family: No     Handoff Completed: No, handoff not indicated or clinically appropriate    Additional Information:  Care management consult ordered for discharge planning. See consult completed 10/12.  Care coordination needed for home infusion. Patient will be discharging on IV antibiotics for 14 days. Per nursing patient did not want to use [facility] due to cost from previous iv antibiotics.   Contacted [facility] for benefit check. They came back with a cost of 30.55 per day for drug and supplies. Patient will have home care for the nursing visits through [facility].  Updated patient at bedside. She is agreeable to [facility] pricing. Anticipate discharge tomorrow. Will update [facility].     Next Steps: coordinate with [facility] for discharge with home infusion and home care      1500 Spoke with Jillian LAZCANO for [facility]. She will plan on coming to see patient tomorrow morning around 1000 to do the teaching. She will send home care referral to [facility].     Stephanie Leonard RN  Care Coordinator  [facility]

## 2024-10-14 NOTE — PROCEDURES
[facility]    Single Lumen Midline Placement    Date/Time: 10/14/2024 12:33 PM    Performed by: Areli Dacosta, RN  Authorized by: Dory Ambrosio DO  Indications: vascular access      UNIVERSAL PROTOCOL   Site Marked: Yes  Prior Images Obtained and Reviewed:  Yes  Required items: Required blood products, implants, devices and special equipment available    Patient identity confirmed:  Verbally with patient, arm band, provided demographic data and hospital-assigned identification number  NA - No sedation, light sedation, or local anesthesia  Confirmation Checklist:  Patient's identity using two indicators, relevant allergies, procedure was appropriate and matched the consent or emergent situation and correct equipment/implants were available  Time out: Immediately prior to the procedure a time out was called (Done with Hina QUINONEZ RN bedside RN)    Universal Protocol: the Joint Commission Universal Protocol was followed    Preparation: Patient was prepped and draped in usual sterile fashion       ANESTHESIA    Local Anesthetic:  Lidocaine 1% without epinephrine  Anesthetic Total (mL):  2.5      SEDATION    Patient Sedated: No        Preparation: skin prepped with ChloraPrep  Skin prep agent: skin prep agent completely dried prior to procedure  Sterile barriers: maximum sterile barriers were used: cap, mask, sterile gown, sterile gloves, and large sterile sheet  Hand hygiene: hand hygiene performed prior to central venous catheter insertion  Type of line used: Midline  Catheter type: single lumen  Lumen type: non-valved  Catheter size: 4 Fr  Brand: Bard  Lot number: [id number]  Placement method: venipuncture, MST and ultrasound  Number of attempts: 1  Difficulty threading catheter: no  Successful placement: yes  Orientation: right  Catheter to Vein (%): 7  Location: basilic vein  Tip Location: distal to axillary vein  Site rationale: Patient preference  Arm circumference: adults 10  cm  Extremity circumference: 30  Visible catheter length: 2  Total catheter length: 11  Dressing and securement: additional securement method (see comment), blood removed, gloves changed prior to final dressing, site cleansed, subcutaneous anchor securement system, sterile dressing applied, blood cleaned with CHG and hemostatic agent (secure cath)  Post procedure assessment: blood return through all ports and free fluid flow  PROCEDURE   Patient Tolerance:  Patient tolerated the procedure well with no immediate complicationsDescribe Procedure: Okay to use Midline.

## 2024-10-14 NOTE — PROGRESS NOTES
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 She was recently hospitalized requiring ICU care for septic shock thought secondary to UTI from 09/22/24 to 09/25/24. She presents with urinary symptoms of frequency and dysuria which started the afternoon prior to admission. In the ED, mild leukocytosis at 12.8, afebrile. She was admitted inpatient for treatment of suspected UTI, on meropenem.     Acute Complicated UTI  Frequent UTIs  She presents with urinary frequency and dysuria which began the afternoon prior to admission. She reports a mild fever at home prior to admission as well. Patient is immunocompromised from transplant medications. She was recently hospitalized requiring ICU care for septic shock thought secondary to UTI from 09/22/24 to 09/25/24. At that time blood cultures grew ESBL Klebsiella and Pseudomonas, urine cultures also grew ESBL Klebsiella. She discharged from the hospital on IV meropenem planned 7 day course. Her outpatient course of meropenem was complicated by difficulty with her vascular access (see ED note 10/01/24), at which time her meropenem course was stopped on day 6 after discharge. She had complete resolution of symptoms at that time. Additionally the patient has extensive antibiotic allergies.   - Mild leukocytosis at 12.8 on admission improved to 7.9 now. Afebrile since presentation.   - Continue meropenem  -ID consulted     Acute Kidney Injury  CKD, stage IIIb  Baseline creatinine about 1.5. Her creatinine was 1.83 on presentation. Most likely etiology dehydration and acute  infection.   - Encouraging patient to drink plenty of fluids. She is tolerating this well so no need for IV fluids at this time.      Hypertension  - Continue PTA metoprolol  - BP was elevated to 180s on admission  - hydralazine 10 mg po q8h was added on admission. Now normotensive. Hold parameters in place. She has PTA hydralazine 25 she takes at home as needed. Continue scheduled hydralazine until she can take her losartan and lasix again.   - She was recently prescribed losartan and had not yet started taking it; would like to resume, resumed 10/14; discontinue hydralazine     S/p Liver Transplant  - PTA prednisone, sirolimus, ursodiol      Lymphedema  Atrial fibrillation  History CVA  She has lymphedema with trace bilateral lower extremity pitting edema on exam with some wrinkling of the skin.  She has paroxysmal A-fib and is on Eliquis 2.5 mg twice daily and metoprolol succinate 25 mg daily with additional pill in pocket strategy, hydralazine 25 mg daily as needed for SBP greater than 160, Zetia 10 mg daily, and furosemide 20 mg daily.  She has a loop recorder in place.  She had history of multiple tiny likely watershed infarcts in the setting of hypotension in 2001.  -Resume Eliquis, metoprolol and Zetia     Generalized weakness  Secondary to acute infection.  Lives in independent living facility.  Uses a cane in the home and a walker when she is out.  -PT/SW consulted     Type II DM  PTA on linagliptin 5 mg daily.  A1c was 6.9 in April.  -Resume linagliptin  -Medium dose sliding scale aspart     Chronic back and left knee pain  PTA gabapentin 300 mg at bedtime. Pain regimen PRN.      Hypothyroidism  Previous thyroid cancer and thyroidectomy.  Resume PTA levothyroxine 175 mcg daily.          Diet: Combination Diet Regular Diet    DVT Prophylaxis: DOAC  Ron Catheter: Not present  Lines: PRESENT           Cardiac Monitoring: None  Code Status: Full Code      Clinically Significant Risk Factors                  "  # Hypertension: Noted on problem list          # DMII: A1C = 6.9 % (Ref range: <=5.7 %) within past 6 months, PRESENT ON ADMISSION  # Overweight: Estimated body mass index is 29.69 kg/m  as calculated from the following:    Height as of this encounter: 1.702 m (5' 7\").    Weight as of this encounter: 86 kg (189 lb 9.5 oz)., PRESENT ON ADMISSION            Disposition Plan      Expected Discharge Date: 10/15/2024      Destination: home;home with help/services  Discharge Comments: From Independent Living          Dory Ambrosio DO  Hospitalist Service  [facility]  Securely message with [employer] (more info)  Text page via [employer] Paging/Directory   ______________________________________________________________________    Interval History   Patient's symptoms are improving, working on antibiotic plan    Physical Exam   Vital Signs: Temp: 97.8  F (36.6  C) Temp src: Temporal BP: 138/62 Pulse: 69   Resp: 16 SpO2: 98 % O2 Device: None (Room air)    Weight: 189 lbs 9.53 oz    General: Very pleasant female , NAD  HEENT:  Sclerae anicteric.  Mucous membranes moist.  Cardiac: Regular rate and rhythm without murmur.    Respiratory: Normal work of breathing.  GI:  Abdomen soft, nontender, nondistended.  Musculoskeletal: Moving all extremities appropriately.  Skin: No rashes or abrasions on exposed skin.  Neurologic: Alert. No gross focal deficits.   Psychologic: Appropriate mood and affect.      Medical Decision Making       45 MINUTES SPENT BY ME on the date of service doing chart review, history, exam, documentation & further activities per the note.      Data         Imaging results reviewed over the past 24 hrs:   No results found for this or any previous visit (from the past 24 hour(s)).         "

## 2024-10-14 NOTE — PLAN OF CARE
"Goal Outcome Evaluation:      Plan of Care Reviewed With: patient    Overall Patient Progress: improvingOverall Patient Progress: improving    Outcome Evaluation: Alert and oriented, straight cath per pt request for urine to check for organisms as ordered by Acosta David abx for now. Midline placed today by VAT. Awaiting set up of home care for IV abx. Pt wants to make sure she gets the right price for the abx. Good appetite. Up with sba to bathroom with cane.      Problem: Adult Inpatient Plan of Care  Goal: Plan of Care Review  Description: The Plan of Care Review/Shift note should be completed every shift.  The Outcome Evaluation is a brief statement about your assessment that the patient is improving, declining, or no change.  This information will be displayed automatically on your shift  note.  Outcome: Progressing  Flowsheets (Taken 10/14/2024 1329)  Outcome Evaluation: Alert and oriented, straight cath per pt request for urine to check for organisms as ordered by Acosta David abx for now. Midline placed today by VAT. Awaiting set up of home care for IV abx. Pt wants to make sure she gets the right price for the abx. Good appetite. Up with sba to bathroom with cane.  Plan of Care Reviewed With: patient  Overall Patient Progress: improving  Goal: Patient-Specific Goal (Individualized)  Description: You can add care plan individualizations to a care plan. Examples of Individualization might be:  \"Parent requests to be called daily at 9am for status\", \"I have a hard time hearing out of my right ear\", or \"Do not touch me to wake me up as it startles  me\".  Outcome: Progressing  Goal: Absence of Hospital-Acquired Illness or Injury  Outcome: Progressing  Intervention: Identify and Manage Fall Risk  Recent Flowsheet Documentation  Taken 10/14/2024 1020 by Hina Reynoso RN  Safety Promotion/Fall Prevention:   activity supervised   clutter free environment maintained   mobility aid in reach   nonskid " shoes/slippers when out of bed   room near nurse's station   safety round/check completed   supervised activity   toileting scheduled  Intervention: Prevent and Manage VTE (Venous Thromboembolism) Risk  Recent Flowsheet Documentation  Taken 10/14/2024 1020 by Hina Reynoso RN  VTE Prevention/Management: SCDs off (sequential compression devices)  Intervention: Prevent Infection  Recent Flowsheet Documentation  Taken 10/14/2024 1020 by Hina Reynoso RN  Infection Prevention:   single patient room provided   rest/sleep promoted   hand hygiene promoted  Goal: Optimal Comfort and Wellbeing  Outcome: Progressing  Goal: Readiness for Transition of Care  Outcome: Progressing     Problem: Pain Acute  Goal: Optimal Pain Control and Function  Outcome: Progressing     Problem: Fall Injury Risk  Goal: Absence of Fall and Fall-Related Injury  Outcome: Progressing  Intervention: Promote Injury-Free Environment  Recent Flowsheet Documentation  Taken 10/14/2024 1020 by Hina Reynoso RN  Safety Promotion/Fall Prevention:   activity supervised   clutter free environment maintained   mobility aid in reach   nonskid shoes/slippers when out of bed   room near nurse's station   safety round/check completed   supervised activity   toileting scheduled     Problem: Infection  Goal: Absence of Infection Signs and Symptoms  Outcome: Progressing  Intervention: Prevent or Manage Infection  Recent Flowsheet Documentation  Taken 10/14/2024 1020 by Hina Reynoso RN  Isolation Precautions: contact precautions maintained     Problem: Comorbidity Management  Goal: Blood Glucose Levels Within Targeted Range  Outcome: Progressing  Goal: Blood Pressure in Desired Range  Outcome: Progressing

## 2024-10-15 VITALS
BODY MASS INDEX: 29.76 KG/M2 | TEMPERATURE: 97.4 F | WEIGHT: 189.6 LBS | RESPIRATION RATE: 17 BRPM | HEART RATE: 70 BPM | DIASTOLIC BLOOD PRESSURE: 59 MMHG | SYSTOLIC BLOOD PRESSURE: 131 MMHG | OXYGEN SATURATION: 94 % | HEIGHT: 67 IN

## 2024-10-15 LAB
ANION GAP SERPL CALCULATED.3IONS-SCNC: 12 MMOL/L (ref 7–15)
BUN SERPL-MCNC: 34.3 MG/DL (ref 8–23)
CALCIUM SERPL-MCNC: 8.7 MG/DL (ref 8.8–10.4)
CHLORIDE SERPL-SCNC: 107 MMOL/L (ref 98–107)
CREAT SERPL-MCNC: 1.42 MG/DL (ref 0.51–0.95)
EGFRCR SERPLBLD CKD-EPI 2021: 38 ML/MIN/1.73M2
GLUCOSE BLDC GLUCOMTR-MCNC: 123 MG/DL (ref 70–99)
GLUCOSE BLDC GLUCOMTR-MCNC: 131 MG/DL (ref 70–99)
GLUCOSE BLDC GLUCOMTR-MCNC: 83 MG/DL (ref 70–99)
GLUCOSE SERPL-MCNC: 101 MG/DL (ref 70–99)
HCO3 SERPL-SCNC: 21 MMOL/L (ref 22–29)
POTASSIUM SERPL-SCNC: 4.2 MMOL/L (ref 3.4–5.3)
SODIUM SERPL-SCNC: 140 MMOL/L (ref 135–145)

## 2024-10-15 PROCEDURE — 82310 ASSAY OF CALCIUM: CPT | Performed by: STUDENT IN AN ORGANIZED HEALTH CARE EDUCATION/TRAINING PROGRAM

## 2024-10-15 PROCEDURE — 99232 SBSQ HOSP IP/OBS MODERATE 35: CPT | Performed by: INTERNAL MEDICINE

## 2024-10-15 PROCEDURE — 99239 HOSP IP/OBS DSCHRG MGMT >30: CPT | Performed by: INTERNAL MEDICINE

## 2024-10-15 PROCEDURE — 250N000013 HC RX MED GY IP 250 OP 250 PS 637: Performed by: INTERNAL MEDICINE

## 2024-10-15 PROCEDURE — 36415 COLL VENOUS BLD VENIPUNCTURE: CPT | Performed by: STUDENT IN AN ORGANIZED HEALTH CARE EDUCATION/TRAINING PROGRAM

## 2024-10-15 PROCEDURE — 250N000013 HC RX MED GY IP 250 OP 250 PS 637: Performed by: STUDENT IN AN ORGANIZED HEALTH CARE EDUCATION/TRAINING PROGRAM

## 2024-10-15 PROCEDURE — 80048 BASIC METABOLIC PNL TOTAL CA: CPT | Performed by: STUDENT IN AN ORGANIZED HEALTH CARE EDUCATION/TRAINING PROGRAM

## 2024-10-15 PROCEDURE — 250N000011 HC RX IP 250 OP 636: Performed by: INTERNAL MEDICINE

## 2024-10-15 PROCEDURE — 250N000012 HC RX MED GY IP 250 OP 636 PS 637: Performed by: INTERNAL MEDICINE

## 2024-10-15 PROCEDURE — 250N000012 HC RX MED GY IP 250 OP 636 PS 637: Performed by: STUDENT IN AN ORGANIZED HEALTH CARE EDUCATION/TRAINING PROGRAM

## 2024-10-15 RX ORDER — MEROPENEM 1 G/1
1 INJECTION, POWDER, FOR SOLUTION INTRAVENOUS EVERY 12 HOURS
Qty: 22 EACH | Refills: 0 | Status: SHIPPED | OUTPATIENT
Start: 2024-10-16 | End: 2024-10-27

## 2024-10-15 RX ADMIN — MEROPENEM 1 G: 1 INJECTION, POWDER, FOR SOLUTION INTRAVENOUS at 01:56

## 2024-10-15 RX ADMIN — OMEGA-3-ACID ETHYL ESTERS 1 CAPSULE: 1 CAPSULE, LIQUID FILLED ORAL at 09:52

## 2024-10-15 RX ADMIN — URSODIOL 250 MG: 250 TABLET ORAL at 10:01

## 2024-10-15 RX ADMIN — LOSARTAN POTASSIUM 25 MG: 25 TABLET, FILM COATED ORAL at 09:45

## 2024-10-15 RX ADMIN — MEROPENEM 1 G: 1 INJECTION, POWDER, FOR SOLUTION INTRAVENOUS at 14:22

## 2024-10-15 RX ADMIN — PREDNISONE 10 MG: 5 TABLET ORAL at 09:47

## 2024-10-15 RX ADMIN — APIXABAN 2.5 MG: 2.5 TABLET, FILM COATED ORAL at 09:48

## 2024-10-15 RX ADMIN — SIROLIMUS 1 MG: 0.5 TABLET, FILM COATED ORAL at 09:59

## 2024-10-15 RX ADMIN — CALCITRIOL CAPSULES 0.25 MCG 0.25 MCG: 0.25 CAPSULE ORAL at 09:59

## 2024-10-15 RX ADMIN — LEVOTHYROXINE SODIUM 175 MCG: 0.03 TABLET ORAL at 09:48

## 2024-10-15 RX ADMIN — FOLIC ACID 1 MG: 1 TABLET ORAL at 10:05

## 2024-10-15 RX ADMIN — METOPROLOL SUCCINATE 25 MG: 25 TABLET, EXTENDED RELEASE ORAL at 09:44

## 2024-10-15 RX ADMIN — NYSTATIN: 100000 CREAM TOPICAL at 09:53

## 2024-10-15 RX ADMIN — EZETIMIBE 10 MG: 10 TABLET ORAL at 09:50

## 2024-10-15 RX ADMIN — FERROUS SULFATE TAB 325 MG (65 MG ELEMENTAL FE) 325 MG: 325 (65 FE) TAB at 09:45

## 2024-10-15 RX ADMIN — SITAGLIPTIN 50 MG: 50 TABLET, FILM COATED ORAL at 09:46

## 2024-10-15 ASSESSMENT — ACTIVITIES OF DAILY LIVING (ADL)
ADLS_ACUITY_SCORE: 36
ADLS_ACUITY_SCORE: 38
ADLS_ACUITY_SCORE: 36
ADLS_ACUITY_SCORE: 36
ADLS_ACUITY_SCORE: 38
ADLS_ACUITY_SCORE: 38
ADLS_ACUITY_SCORE: 36
ADLS_ACUITY_SCORE: 38
ADLS_ACUITY_SCORE: 36
ADLS_ACUITY_SCORE: 38
ADLS_ACUITY_SCORE: 36
ADLS_ACUITY_SCORE: 38

## 2024-10-15 NOTE — PLAN OF CARE
Goal Outcome Evaluation:      Plan of Care Reviewed With: patient    Overall Patient Progress: improvingOverall Patient Progress: improving    Outcome Evaluation: No prn medications given overnight. Pt refused blood draw from midline and refussed dressing change. As there was some drainage around the site. SBA with cane. Plan for DC home with home health infusions for iv antibiotics. Possible DC 10/15      Problem: Adult Inpatient Plan of Care  Goal: Plan of Care Review  Description: The Plan of Care Review/Shift note should be completed every shift.  The Outcome Evaluation is a brief statement about your assessment that the patient is improving, declining, or no change.  This information will be displayed automatically on your shift  note.  Outcome: Progressing  Flowsheets (Taken 10/15/2024 0501)  Outcome Evaluation: No prn medications given overnight. Pt refused blood draw from midline and refussed dressing change. As there was some drainage around the site. SBA with cane. Plan for DC home with home health infusions for iv antibiotics. Possible DC 10/15  Plan of Care Reviewed With: patient  Overall Patient Progress: improving  Goal: Absence of Hospital-Acquired Illness or Injury  Intervention: Identify and Manage Fall Risk  Recent Flowsheet Documentation  Taken 10/15/2024 0200 by Araseli Garcia RN  Safety Promotion/Fall Prevention:   activity supervised   assistive device/personal items within reach   clutter free environment maintained   increased rounding and observation   nonskid shoes/slippers when out of bed   safety round/check completed  Intervention: Prevent and Manage VTE (Venous Thromboembolism) Risk  Recent Flowsheet Documentation  Taken 10/15/2024 0200 by Araseli Garcia RN  VTE Prevention/Management: SCDs off (sequential compression devices)  Intervention: Prevent Infection  Recent Flowsheet Documentation  Taken 10/15/2024 0200 by Araseli Garcia RN  Infection Prevention: single patient room  provided     Problem: Pain Acute  Goal: Optimal Pain Control and Function  Intervention: Prevent or Manage Pain  Recent Flowsheet Documentation  Taken 10/15/2024 0200 by Araseli Garcia RN  Medication Review/Management: medications reviewed     Problem: Fall Injury Risk  Goal: Absence of Fall and Fall-Related Injury  Intervention: Identify and Manage Contributors  Recent Flowsheet Documentation  Taken 10/15/2024 0200 by Araseli Garcia RN  Medication Review/Management: medications reviewed  Intervention: Promote Injury-Free Environment  Recent Flowsheet Documentation  Taken 10/15/2024 0200 by Araseli Garcia RN  Safety Promotion/Fall Prevention:   activity supervised   assistive device/personal items within reach   clutter free environment maintained   increased rounding and observation   nonskid shoes/slippers when out of bed   safety round/check completed     Problem: Infection  Goal: Absence of Infection Signs and Symptoms  Intervention: Prevent or Manage Infection  Recent Flowsheet Documentation  Taken 10/15/2024 0200 by Araseli Garcia RN  Isolation Precautions: contact precautions maintained     Problem: Comorbidity Management  Goal: Blood Glucose Levels Within Targeted Range  Intervention: Monitor and Manage Glycemia  Recent Flowsheet Documentation  Taken 10/15/2024 0200 by Araseli Garcia RN  Medication Review/Management: medications reviewed  Goal: Blood Pressure in Desired Range  Intervention: Maintain Blood Pressure Management  Recent Flowsheet Documentation  Taken 10/15/2024 0200 by Araseli Garcia RN  Medication Review/Management: medications reviewed

## 2024-10-15 NOTE — PROGRESS NOTES
Care Management Discharge Note    Discharge Date: 10/15/2024       Discharge Disposition:  home    Discharge Services:  home infusion    Discharge DME:  none    Discharge Transportation: family or friend will provide    Private pay costs discussed: Not applicable    Does the patient's insurance plan have a 3 day qualifying hospital stay waiver?  No    Education Provided on the Discharge Plan:  yes  Persons Notified of Discharge Plans: patient  Patient/Family in Agreement with the Plan:  yes    Handoff Referral Completed: No, handoff not indicated or clinically appropriate    Additional Information:  Patient discharging home with iv antibiotics throught [facility]. Teaching has been completed by [facility] liaison and they did secure homecare through [facility] for the skilled nursing.     Updated [facility] that patient will be discharging home today. Infectious disease is keeping her on the same antibiotic for 11 days. Also let [facility] know patient had her dose at about 1400. Jillian with [facility] with contact patient to coordinate delivery of supplies.    Faxed discharge orders to [facility]. [facility] able to pull the orders off of [facility].     Stephanie Leonard RN  Care Coordinator  [facility]

## 2024-10-15 NOTE — PLAN OF CARE
Goal Outcome Evaluation:    Patient discharged home at 1740, midline patent. Discharge instructions gone through with patient, staff escorted patient off the unit on a wheelchair to the car where daughter was waiting. Confirmed that patient had talked to home care responsible for her home infusions.

## 2024-10-15 NOTE — PROGRESS NOTES
[facility]  Infectious Disease Progress Note          Assessment and Plan:   IMPRESSION AND PLAN  73 yo woman s/p Liver transplant in the past on immune suppression  ESBL pyelonephritis 3 weeks ago, s/p 9 d of therapy  Now back with chills, dysuria, frequency,      Pyelonephritis R kidney / probable recurrent/ under-treated ESBL Klebsiella  2.   Leukocytosis  3. Liver transplant on immune suppression        Challenging problem, patient is improved on meropenem, urine culture had mixed louann so not being worked up, and the background is a concern about previous Pseudomonas bacteremia that had unclear source (blood culture was negative for that), as it turned out not treated but clinically improved and follow-up cultures are negative so we elected to simply watch.     Recommendations  Continue the meropenem 1 g q 12 hrs  I still have concern about the prior Pseudomonas but she is clinically improved without treating that again now, repeat urinalysis completely normal which is reassuring that we have the organism covered, also blood cultures remain negative in the clinic patient is clinically improved on the meropenem  Orders in for meropenem to complete a full 2-week course on this occasion with 11 more days        Interval History:     no new complaints mostly feels better temp is down, no positive microbiology no ongoing fever or major urinary symptoms.  Urinalysis was abnormal urine culture with mixed louann I suspect either the ESBL organism or possibly the previous Pseudomonas are the cause here repeat urinalysis and urine culture pending on antibiotics              Medications:     Current Facility-Administered Medications   Medication Dose Route Frequency Provider Last Rate Last Admin    apixaban ANTICOAGULANT (ELIQUIS) tablet 2.5 mg  2.5 mg Oral BID Kenneth Shaw MD   2.5 mg at 10/15/24 0948    calcitRIOL (ROCALTROL) capsule 0.25 mcg  0.25 mcg Oral Daily Kenneth Shaw MD   0.25  mcg at 10/15/24 0959    ezetimibe (ZETIA) tablet 10 mg  10 mg Oral Daily Kenneth Shaw MD   10 mg at 10/15/24 0950    ferrous sulfate (FEROSUL) tablet 325 mg  325 mg Oral BID Kenneth Shaw MD   325 mg at 10/15/24 0945    folic acid (FOLVITE) tablet 1 mg  1 mg Oral Daily Kenneth Shaw MD   1 mg at 10/15/24 1005    [Held by provider] furosemide (LASIX) tablet 20 mg  20 mg Oral Daily Kenneth Shaw MD        gabapentin (NEURONTIN) solution 300 mg  300 mg Oral At Bedtime Kenneth Sahw MD   300 mg at 10/14/24 2119    insulin aspart (NovoLOG) injection (RAPID ACTING)  1-7 Units Subcutaneous TID AC Kenneth Shaw MD   2 Units at 10/14/24 1747    insulin aspart (NovoLOG) injection (RAPID ACTING)  1-5 Units Subcutaneous At Bedtime Kenneth Shaw MD        levothyroxine (SYNTHROID/LEVOTHROID) tablet 175 mcg  175 mcg Oral Daily Kenneth Shaw MD   175 mcg at 10/15/24 0948    losartan (COZAAR) tablet 25 mg  25 mg Oral Daily Dory Ambrosio DO   25 mg at 10/15/24 0945    meropenem (MERREM) 1 g vial to attach to  mL bag  1 g Intravenous Q12H Kenneth Shaw MD   1 g at 10/15/24 1422    metoprolol succinate ER (TOPROL XL) 24 hr tablet 25 mg  25 mg Oral Daily Kenneth Shaw MD   25 mg at 10/15/24 0944    nystatin (MYCOSTATIN) cream   Topical BID Dory Ambrosio DO   Given at 10/15/24 0953    omega-3 acid ethyl esters (LOVAZA) capsule 1 capsule  1 capsule Oral BID Ha Arias MD   1 capsule at 10/15/24 0952    predniSONE (DELTASONE) tablet 10 mg  10 mg Oral Daily Kenneth Shaw MD   10 mg at 10/15/24 0947    sirolimus (GENERIC EQUIVALENT) tablet 1 mg  1 mg Oral Daily Dory Ambrosio DO   1 mg at 10/15/24 0959    sitagliptin (JANUVIA) tablet 50 mg  50 mg Oral Daily Kenneth Shaw MD   50 mg at 10/15/24 0946    sodium chloride (PF) 0.9% PF flush 10 mL  10 mL Intracatheter Q8H Dory Ambrosio DO   10 mL at 10/15/24 1002    sodium chloride (PF) 0.9% PF flush  "3 mL  3 mL Intracatheter Q8H Kenneth Shaw MD   3 mL at 10/14/24 1838    ursodiol (ACTIGALL) tablet 250 mg  250 mg Oral BID Kenneth Shaw MD   250 mg at 10/15/24 1001                  Physical Exam:   Blood pressure 131/59, pulse 70, temperature 97.4  F (36.3  C), temperature source Temporal, resp. rate 17, height 1.702 m (5' 7\"), weight 86 kg (189 lb 9.5 oz), last menstrual period 06/01/1988, SpO2 94%, not currently breastfeeding.  Wt Readings from Last 2 Encounters:   10/13/24 86 kg (189 lb 9.5 oz)   10/01/24 81.2 kg (179 lb)     Vital Signs with Ranges  Temp:  [96.9  F (36.1  C)-98  F (36.7  C)] 97.4  F (36.3  C)  Pulse:  [64-70] 70  Resp:  [16-24] 17  BP: (131-150)/(53-74) 131/59  SpO2:  [94 %-98 %] 94 %    Constitutional: Awake, alert, cooperative, no apparent distress     Lungs: Clear to auscultation bilaterally, no crackles or wheezing   Cardiovascular: Regular rate and rhythm, normal S1 and S2, and no murmur noted   Abdomen: Normal bowel sounds, soft, non-distended, non-tender   Skin: No rashes, no cyanosis, no edema   Other:           Data:   All microbiology laboratory data reviewed.  Recent Labs   Lab Test 10/13/24  1100 10/12/24  0656 10/11/24  2212   WBC 6.7 7.9 12.8*   HGB 10.4* 10.4* 12.0   HCT 33.8* 32.6* 37.2   MCV 96 93 93    307 360     Recent Labs   Lab Test 10/15/24  0659 10/13/24  1100 10/12/24  0656   CR 1.42* 1.64* 1.70*     Recent Labs   Lab Test 08/26/19  2331   SED 78*     Recent Labs   Lab Test 08/30/19  0657 08/29/19  0544 08/28/19  1718 08/28/19  1710 08/27/19  0224 08/27/19  0150 08/26/19  2331 09/27/18  1912 07/30/18  0852   CULT No growth No growth No growth No growth 10,000 to 50,000 colonies/mL  Escherichia coli  * Cultured on the 1st day of incubation:  Escherichia coli  Susceptibility testing done on previous specimen  *  Critical Value/Significant Value, preliminary result only, called to and read back by   Maria Teresa Martines RN 08/27/2019 @[address] [place] [address]/hdp   Cultured on " the 1st day of incubation:  Escherichia coli  *  Critical Value/Significant Value, preliminary result only, called to and read back by  NENO Robert at [id number] [id number]    (Note)  POSITIVE for E.COLI by Verigene multiplex nucleic acid test. Final  identification and antimicrobial susceptibility testing will be  verified by standard methods. Verigene test will not distinguish  E.coli from Shigella species including S.dysenteriae, S.flexneri,  S.boydii, and S.sonnei. Specimens containing Shigella species or  E.coli will be reported as Positive for E.coli.    Specimen tested with Verigene multiplex, gram-negative blood culture  nucleic acid test for the following targets: Acinetobacter sp.,  Citrobacter sp., Enterobacter sp., Proteus sp., E. coli, K.  pneumoniae/oxytoca, P. aeruginosa, and the following resistance  markers: CTXM, KPC, NDM, VIM, IMP and OXA.    Critical Value/Significant Value called to and read back by  Flower Lujan Rn @ 1056 8.27.19 CS     Moderate growth  Staphylococcus aureus  * 50,000 to 100,000 colonies/mL  mixed urogenital louann  Susceptibility testing not routinely done

## 2024-10-15 NOTE — PLAN OF CARE
"Goal Outcome Evaluation:      Plan of Care Reviewed With: patient    Overall Patient Progress: improvingOverall Patient Progress: improving    Outcome Evaluation: A&O x 4. VSS. Up with SBA. On room air.  Denies pain, nausea or SOB. [place], uses hearing aid. On prednisone. Hx of DVT, on Eliquis. Abx meropenem for UTI. Tracing edema on BLE. On contact for ESBL on the urine. BG 83 and 123. Refused bed alarm. Safety education given. Plan to discharge home with home infusion. Midline in place, intact and patent.      Problem: Adult Inpatient Plan of Care  Goal: Plan of Care Review  Description: The Plan of Care Review/Shift note should be completed every shift.  The Outcome Evaluation is a brief statement about your assessment that the patient is improving, declining, or no change.  This information will be displayed automatically on your shift  note.  Outcome: Progressing  Flowsheets (Taken 10/15/2024 6648)  Outcome Evaluation: A&O x 4. VSS. Up with SBA. On room air.  Denies pain, nausea or SOB. Qagan [place]in, uses hearing aid. On prednisone. Hx of DVT, on Eliquis. Abx meropenem for UTI. Tracing edema on BLE. On contact for ESBL on the urine. BG 83 and 123. Refused bed alarm. Safety education given. Plan to discharge home with home infusion. Midline in place, intact and patent.  Plan of Care Reviewed With: patient  Overall Patient Progress: improving  Goal: Patient-Specific Goal (Individualized)  Description: You can add care plan individualizations to a care plan. Examples of Individualization might be:  \"Parent requests to be called daily at 9am for status\", \"I have a hard time hearing out of my right ear\", or \"Do not touch me to wake me up as it startles  me\".  Outcome: Progressing  Goal: Absence of Hospital-Acquired Illness or Injury  Outcome: Progressing  Intervention: Identify and Manage Fall Risk  Recent Flowsheet Documentation  Taken 10/15/2024 7172 by Carly Kowalski RN  Safety Promotion/Fall Prevention:   activity " supervised   clutter free environment maintained   nonskid shoes/slippers when out of bed   safety round/check completed  Intervention: Prevent Skin Injury  Recent Flowsheet Documentation  Taken 10/15/2024 0942 by Carly Kowalski RN  Body Position: position changed independently  Device Skin Pressure Protection: absorbent pad utilized/changed  Intervention: Prevent and Manage VTE (Venous Thromboembolism) Risk  Recent Flowsheet Documentation  Taken 10/15/2024 0942 by Carly Kowalski RN  VTE Prevention/Management: SCDs off (sequential compression devices)  Intervention: Prevent Infection  Recent Flowsheet Documentation  Taken 10/15/2024 0942 by Carly Kowalski RN  Infection Prevention:   hand hygiene promoted   personal protective equipment utilized   single patient room provided  Taken 10/15/2024 0730 by Carly Kowalski RN  Infection Prevention:   hand hygiene promoted   personal protective equipment utilized   single patient room provided  Goal: Optimal Comfort and Wellbeing  Outcome: Progressing  Goal: Readiness for Transition of Care  Outcome: Progressing

## 2024-10-15 NOTE — DISCHARGE SUMMARY
"[facility]  Hospitalist Discharge Summary      Date of Admission:  10/11/2024  Date of Discharge:  10/15/2024  Discharging Provider: Randell Knight MD  Discharge Service: Hospitalist Service    Discharge Diagnoses     Acute complicated UTI  Chronic immunosuppression  Frequent UTIs  Acute kidney injury  CKD, stage IIIb  Hypertension  S/p liver transplant  Lymphedema  Atrial fibrillation  History CVA  Generalized weakness  Type II DM  Chronic back and left knee pain  Hypothyroidism    Clinically Significant Risk Factors     # DMII: A1C = 6.9 % (Ref range: <=5.7 %) within past 6 months  # Overweight: Estimated body mass index is 29.69 kg/m  as calculated from the following:    Height as of this encounter: 1.702 m (5' 7\").    Weight as of this encounter: 86 kg (189 lb 9.5 oz).       Follow-ups Needed After Discharge   Follow-up Appointments     Follow-up and recommended labs and tests       Follow up with primary care provider, Physician No Ref-Primary, within   7-14 days for hospital follow- up.  The following labs/tests are   recommended: CMP.            Unresulted Labs Ordered in the Past 30 Days of this Admission       Date and Time Order Name Status Description    10/11/2024 10:41 PM Blood Culture Peripheral Blood Preliminary         These results will be followed up by hospitalists    Discharge Disposition   Discharged to home  Condition at discharge: Stable    Hospital Course   Luz Thompson is a 74 year old female with past medical history significant for PBC s/p liver transplant in 2002 on chronic immunosuppression, ESBL/VRE infections, frequent UTIs, CKDIIIb, paroxysmal atrial fibrillation on Eliquis with loop recorder in place, DVT, T2DM, lymphedema, HTN, HLD, CVA, hypothyroidism. She was recently hospitalized requiring ICU care for septic shock thought secondary to UTI from 09/22/24 to 09/25/24. She presented with urinary symptoms of frequency and dysuria which started the " afternoon prior to admission. In the ED, she had mild leukocytosis (12.8) but was afebrile. She was admitted for treatment of suspected UTI. She was treated with meropenem. Urine culture grew mixed louann. ID followed and UTI was suspected. Given the patient's multiple drug allergies she was discharged on Meropenem via midline catheter for 11 more days.      Acute Complicated UTI  Frequent UTIs  -Urine culture grew mixed louann. ID followed and UTI was suspected. Given the patient's multiple drug allergies she was discharged on Meropenem via midline catheter for 11 more days.      Acute Kidney Injury  CKD, stage IIIb  -Baseline creatinine about 1.5. Her creatinine was 1.83 on presentation and is now down to 1.42.     Hypertension  -Continue PTA metoprolol, losartan, and prn hydralazine    S/p Liver Transplant  -Continue PTA prednisone, sirolimus, and ursodiol      Lymphedema  Atrial fibrillation  History CVA  -Continue PTA Eliquis, metoprolol,and Zetia     Generalized weakness  -PT/SW consulted     Type II DM  -PTA on linagliptin 5 mg daily.  A1c was 6.9 in April.  -Continue PTA linagliptin  -Medium dose sliding scale aspart insulin used in hospital     Chronic back and left knee pain  -Continue PTA gabapentin 300 mg at bedtime     Hypothyroidism  -Continue PTA levothyroxine 175 mcg daily.       Consultations This Hospital Stay   CARE MANAGEMENT / SOCIAL WORK IP CONSULT  INFECTIOUS DISEASES IP CONSULT  VASCULAR ACCESS ADULT IP CONSULT  CARE MANAGEMENT / SOCIAL WORK IP CONSULT    Code Status   Full Code    Time Spent on this Encounter   I, Randell Knight MD, personally saw the patient today and spent greater than 30 minutes discharging this patient.       Randell Knight MD  [facility]  [address] [place] [address]  Phone: [phone]  Fax: [phone]  ______________________________________________________________________    Physical Exam   Vital Signs: Temp: 97.4  F  (36.3  C) Temp src: Temporal BP: 131/59 Pulse: 70   Resp: 17 SpO2: 94 % O2 Device: None (Room air)    Weight: 189 lbs 9.53 oz  GENERAL:  Comfortable. Cooperative.  PSYCH: pleasant, oriented, No acute distress.  EYES: PERRLA, Normal conjunctiva.  HEART:  Regular rate and rhythm. No JVD. Pulses normal. No edema.  LUNGS:  Clear to auscultation, normal Respiratory effort.  ABDOMEN:  Soft, no hepatosplenomegaly, normal bowel sounds.  EXTREMETIES: No clubbing, cyanosis or ischemia  SKIN:  Dry to touch, No rash.           Primary Care Physician   Physician No Ref-Primary    Discharge Orders      Medication Therapy Management Referral      Home Infusion Referral      Home Care Referral      Reason for your hospital stay    Suspected recurrent UTI.     Follow-up and recommended labs and tests     Follow up with primary care provider, Physician No Ref-Primary, within 7-14 days for hospital follow- up.  The following labs/tests are recommended: CMP.     Activity    Your activity upon discharge: activity as tolerated     Diet    Follow this diet upon discharge: Regular       Significant Results and Procedures   Most Recent 3 CBC's:  Recent Labs   Lab Test 10/13/24  1100 10/12/24  0656 10/11/24  2212   WBC 6.7 7.9 12.8*   HGB 10.4* 10.4* 12.0   MCV 96 93 93    307 360     Most Recent 3 BMP's:  Recent Labs   Lab Test 10/15/24  1218 10/15/24  0827 10/15/24  0659 10/13/24  1724 10/13/24  1100 10/12/24  0725 10/12/24  0656   NA  --   --  140  --  141  --  141   POTASSIUM  --   --  4.2  --  4.3  --  4.3   CHLORIDE  --   --  107  --  104  --  105   CO2  --   --  21*  --  23  --  22   BUN  --   --  34.3*  --  30.8*  --  32.3*   CR  --   --  1.42*  --  1.64*  --  1.70*   ANIONGAP  --   --  12  --  14  --  14   KEILY  --   --  8.7*  --  9.4  --  8.3*   * 83 101*   < > 112*   < > 180*    < > = values in this interval not displayed.     Most Recent 2 LFT's:  Recent Labs   Lab Test 09/30/24  1545 09/22/24  0144 09/04/24  1008    AST 21 31 23   ALT 26 26 20   ALKPHOS  --  146 100   BILITOTAL  --  0.8 0.4     7-Day Micro Results       Collected Updated Procedure Result Status      10/11/2024 2259 10/15/2024 0246 Blood Culture Peripheral Blood [[id number]]   Peripheral Blood    Preliminary result Component Value   Culture No growth after 3 days  [P]                10/11/2024 2200 10/13/2024 0608 Urine Culture [[id number]]   Urine, Clean Catch    Final result Component Value   Culture 50,000-100,000 CFU/mL Mixture of Urogenital Rashmi                   ,   Results for orders placed or performed during the hospital encounter of 09/22/24   XR Chest Port 1 View    Narrative    EXAM: XR CHEST PORT 1 VIEW  LOCATION: [facility]  DATE: 9/22/2024    INDICATION: Fever  COMPARISON: 8/31/2019      Impression    IMPRESSION: No consolidation or edema. No pleural effusion or pneumothorax. Normal heart size and pulmonary vascularity. Implanted electronic device projecting over the left chest.    XR Chest Port 1 View    Narrative    EXAM: XR CHEST PORT 1 VIEW  LOCATION: [facility]  DATE: 9/22/2024    INDICATION: Central line placement  COMPARISON: 9/22/2024      Impression    IMPRESSION: Left IJ approach central venous catheter tip at the upper SVC. No pneumothorax. Lungs clear. No pleural effusion. Stable cardiomediastinal silhouette. Left chest implanted electronic device.     *Note: Due to a large number of results and/or encounters for the requested time period, some results have not been displayed. A complete set of results can be found in Results Review.       Discharge Medications   Current Discharge Medication List        START taking these medications    Details   meropenem (MERREM) 1 g injection Inject 20 mLs (1 g) over 30 minutes into the vein every 12 hours for 11 days. Weekly CBC/diff,creat,SGOT to Alex fax  fax  Qty: 22 each, Refills: 0    Associated Diagnoses: ESBL (extended  spectrum beta-lactamase) producing bacteria infection           CONTINUE these medications which have NOT CHANGED    Details   apixaban ANTICOAGULANT (ELIQUIS ANTICOAGULANT) 2.5 MG tablet Take 1 tablet (2.5 mg) by mouth 2 times daily  Qty: 180 tablet, Refills: 3    Associated Diagnoses: Paroxysmal atrial fibrillation (H)      azelastine (ASTELIN) 0.1 % nasal spray Spray 1 spray into both nostrils as needed for allergies or rhinitis.      calcitRIOL (ROCALTROL) 0.25 MCG capsule Take 1 capsule (0.25 mcg) by mouth daily.  Qty: 90 capsule, Refills: 3    Associated Diagnoses: Papillary thyroid carcinoma (H)      D-MANNOSE PO Take 1 Scoop by mouth 2 times daily.      estradiol (ESTRACE) 0.1 MG/GM vaginal cream Place vaginally every other day.      evolocumab (REPATHA SURECLICK) 140 MG/ML prefilled autoinjector Inject 1 mL (140 mg) subcutaneously every 14 days. . Please have fasting labs drawn for further refills.  Qty: 6 mL, Refills: 3    Associated Diagnoses: Hyperlipidemia LDL goal <70; Statin intolerance; HDL deficiency; Type 2 diabetes mellitus with stage 3 chronic kidney disease, without long-term current use of insulin (H); Hypertriglyceridemia; H/O: CVA (cerebrovascular accident)      ezetimibe (ZETIA) 10 MG tablet Take 1 tablet (10 mg) by mouth daily.  Qty: 90 tablet, Refills: 3    Associated Diagnoses: Hyperlipidemia LDL goal <70; Benign essential hypertension      Ferrous Sulfate 324 MG TBEC Take 1 tablet by mouth 2 times daily.      folic acid (FOLVITE) 1 MG tablet Take 1 tablet (1 mg) by mouth daily  Qty: 100 tablet, Refills: 0    Comments: Further fills thru PCP  Associated Diagnoses: Liver replaced by transplant (H)      furosemide (LASIX) 20 MG tablet Take 1 tablet (20 mg) by mouth daily  Qty: 90 tablet, Refills: 3    Associated Diagnoses: CKD (chronic kidney disease) stage 3, GFR 30-59 ml/min (H); Liver replaced by transplant (H)      gabapentin (NEURONTIN) 250 MG/5ML solution Take 6 mLs (300 mg) by mouth  at bedtime  Qty: 180 mL, Refills: 0    Associated Diagnoses: Liver replaced by transplant (H)      glucose (BD GLUCOSE) 5 g chewable tablet Take 2 tablets (10 g) by mouth as needed (low blood sugar)  Qty: 40 tablet, Refills: 1    Associated Diagnoses: Type 2 diabetes mellitus with stage 3 chronic kidney disease, without long-term current use of insulin (H)      hydrALAZINE (APRESOLINE) 25 MG tablet Take 25 mg by mouth as needed (systolic bp >160).      hypromellose (ARTIFICIAL TEARS) 0.4 % SOLN ophthalmic solution Apply 1 drop to eye every hour as needed for dry eyes    Associated Diagnoses: Liver replaced by transplant (H)      levothyroxine (SYNTHROID/LEVOTHROID) 175 MCG tablet Take 1 tablet (175 mcg) by mouth daily.  Qty: 90 tablet, Refills: 3    Associated Diagnoses: Postoperative hypothyroidism      linagliptin (TRADJENTA) 5 MG TABS tablet Take 1 tablet (5 mg) by mouth daily  Qty: 90 tablet, Refills: 1    Associated Diagnoses: Type 2 diabetes mellitus with stage 3 chronic kidney disease, without long-term current use of insulin (H)      meclizine (ANTIVERT) 25 MG tablet Take 1 tablet (25 mg) by mouth as needed for dizziness or other (migraines)  Qty: 30 tablet, Refills: 11    Associated Diagnoses: Dizziness      metoprolol succinate ER (TOPROL XL) 25 MG 24 hr tablet Take 1 tablet (25 mg) by mouth daily. Take an extra tablet daily as needed for breakthrough afib. May repeat in two hours if heart rate remains >100bpm (no more than 3 tablets per day).  Qty: 135 tablet, Refills: 3    Associated Diagnoses: Paroxysmal atrial fibrillation (H)      Multiple Vitamins-Minerals (PRESERVISION AREDS 2) CAPS Take 1 capsule by mouth 2 times daily    Associated Diagnoses: Liver replaced by transplant (H)      Nutrisource Fiber PO packet Take 1 packet by mouth daily.      omega-3 acid ethyl esters (LOVAZA) 1 g capsule Take 1 capsule by mouth 2 times daily  Qty: 180 capsule, Refills: 1    Associated Diagnoses:  Hypertriglyceridemia      predniSONE (DELTASONE) 10 MG tablet Take 1 tablet (10 mg) by mouth daily.  Qty: 90 tablet, Refills: 3    Comments: TXP DT 5/22/2002 (Liver) TXP Dischg DT 6/3/2002 DX Liver replaced by transplant Z94.4 TX [facility] ([place], [place])  Associated Diagnoses: Liver replaced by transplant (H)      sirolimus (GENERIC EQUIVALENT) 1 MG tablet Take 1 tablet (1 mg) by mouth daily.  Qty: 90 tablet, Refills: 3    Comments: TXP DT 5/22/2002 (Liver) TXP Dischg DT 6/3/2002 DX Liver replaced by transplant Z94.4 TX [facility] [place] ([place], [place]), dose change  Associated Diagnoses: Liver replaced by transplant (H)      ursodiol (ACTIGALL) 250 MG tablet Take 1 tablet (250 mg) by mouth 2 times daily  Qty: 180 tablet, Refills: 3    Associated Diagnoses: Liver replaced by transplant (H)      losartan (COZAAR) 25 MG tablet Take 1 tablet (25 mg) by mouth daily.  Qty: 30 tablet, Refills: 3    Comments: Patient wants this delivered  Associated Diagnoses: Hypertension goal BP (blood pressure) < 140/80           Allergies   Allergies   Allergen Reactions    Fluconazole Hives and Itching     Full body hives      Mycophenolate Diarrhea and Nausea and Vomiting     Patient stated it was chronic and lasted months      Penicillins Rash, Anaphylaxis, Hives and Itching    Simvastatin Muscle Pain (Myalgia)     severe  Other reaction(s): Myalgia caused by statin    Methotrexate Other (See Comments)     Other reaction(s): Sore  Sores in mouth, esophagus, and stomach.       Morphine And Codeine Itching and Other (See Comments)     Psych disturbance  Other reaction(s): Confusion, Mood alteration    Quinolones Anxiety, Dizziness, Headache, Other (See Comments), Palpitations and Unknown     Other reaction(s): Hyperactive behavior, Lightheadedness, Mood alteration    Dizzy, light headed    Dizziness, shaky, and jumpy    Benadryl [Diphenhydramine  Hcl]      Insomnia     Capsules, Empty Gelatin [Gelatin]     Cephalosporins Itching    Ciprofloxacin Hcl Dizziness and Other (See Comments)    Lansoprazole Diarrhea    Azithromycin Itching    Doxycycline Itching and Unknown    Lisinopril Cough    Omeprazole Itching    Tolectin [Nsaids] Rash    Tolmetin Rash and Itching    Tramadol Rash, Hives and Itching

## 2024-10-15 NOTE — PLAN OF CARE
"Goal Outcome Evaluation:      Plan of Care Reviewed With: patient    Overall Patient Progress: improvingOverall Patient Progress: improving    Outcome Evaluation: pt is A&OX4, on RA. cont. IV Merrem. midline SL CDI.  no c/o SoB, chills, no fever.tolerating regular diet. SBA ambulated to bathroom, voiding.plan is to discharge  home with IV antibotic.      Problem: Adult Inpatient Plan of Care  Goal: Plan of Care Review  Description: The Plan of Care Review/Shift note should be completed every shift.  The Outcome Evaluation is a brief statement about your assessment that the patient is improving, declining, or no change.  This information will be displayed automatically on your shift  note.  Outcome: Progressing  Flowsheets (Taken 10/14/2024 9025)  Outcome Evaluation: pt is A&OX4, on RA. cont. IV Merrem. midline SL CDI.  no c/o SoB, chills, no fever.tolerating regular diet. SBA ambulated to bathroom, voiding.plan is to discharge  home with IV antibotic.  Plan of Care Reviewed With: patient  Overall Patient Progress: improving  Goal: Patient-Specific Goal (Individualized)  Description: You can add care plan individualizations to a care plan. Examples of Individualization might be:  \"Parent requests to be called daily at 9am for status\", \"I have a hard time hearing out of my right ear\", or \"Do not touch me to wake me up as it startles  me\".  Outcome: Progressing  Goal: Absence of Hospital-Acquired Illness or Injury  Outcome: Progressing  Intervention: Identify and Manage Fall Risk  Recent Flowsheet Documentation  Taken 10/14/2024 1711 by Kayla Coelho, RN  Safety Promotion/Fall Prevention:   activity supervised   assistive device/personal items within reach   clutter free environment maintained   increased rounding and observation   nonskid shoes/slippers when out of bed   safety round/check completed  Intervention: Prevent and Manage VTE (Venous Thromboembolism) Risk  Recent Flowsheet Documentation  Taken 10/14/2024 " 1719 by Kayla Coelho RN  VTE Prevention/Management: SCDs off (sequential compression devices)  Intervention: Prevent Infection  Recent Flowsheet Documentation  Taken 10/14/2024 1719 by Kayla Coelho RN  Infection Prevention: single patient room provided  Goal: Optimal Comfort and Wellbeing  Outcome: Progressing  Goal: Readiness for Transition of Care  Outcome: Progressing     Problem: Pain Acute  Goal: Optimal Pain Control and Function  Outcome: Progressing  Intervention: Prevent or Manage Pain  Recent Flowsheet Documentation  Taken 10/14/2024 1719 by Kayla Coelho RN  Medication Review/Management: medications reviewed     Problem: Fall Injury Risk  Goal: Absence of Fall and Fall-Related Injury  Outcome: Progressing  Intervention: Identify and Manage Contributors  Recent Flowsheet Documentation  Taken 10/14/2024 1719 by Kayla Coelho RN  Medication Review/Management: medications reviewed  Intervention: Promote Injury-Free Environment  Recent Flowsheet Documentation  Taken 10/14/2024 1719 by Kayla Coelho RN  Safety Promotion/Fall Prevention:   activity supervised   assistive device/personal items within reach   clutter free environment maintained   increased rounding and observation   nonskid shoes/slippers when out of bed   safety round/check completed     Problem: Infection  Goal: Absence of Infection Signs and Symptoms  Outcome: Progressing  Intervention: Prevent or Manage Infection  Recent Flowsheet Documentation  Taken 10/14/2024 1719 by Kayla Coelho RN  Isolation Precautions: contact precautions maintained     Problem: Comorbidity Management  Goal: Blood Glucose Levels Within Targeted Range  Outcome: Progressing  Intervention: Monitor and Manage Glycemia  Recent Flowsheet Documentation  Taken 10/14/2024 1719 by Kayla Coelho RN  Medication Review/Management: medications reviewed  Goal: Blood Pressure in Desired Range  Outcome: Progressing  Intervention: Maintain Blood Pressure  Management  Recent Flowsheet Documentation  Taken 10/14/2024 1719 by Kayla Coelho, RN  Medication Review/Management: medications reviewed

## 2024-10-16 ENCOUNTER — TELEPHONE (OUTPATIENT)
Dept: PHARMACY | Facility: OTHER | Age: 74
End: 2024-10-16
Payer: MEDICARE

## 2024-10-16 NOTE — TELEPHONE ENCOUNTER
MTM referral from: Transitions of Care (recent hospital discharge, TCU discharge, or ED visit)    MTM referral outreach attempt #1 on October 16, 2024 at 1:39 PM      Outcome: Spoke with patient declined MTM services.    Use zia for the carrier/Plan on the flowsheet          NOBLE Cisse   [phone]

## 2024-10-17 ENCOUNTER — OFFICE VISIT (OUTPATIENT)
Dept: GASTROENTEROLOGY | Facility: CLINIC | Age: 74
End: 2024-10-17
Attending: INTERNAL MEDICINE
Payer: MEDICARE

## 2024-10-17 VITALS
OXYGEN SATURATION: 98 % | WEIGHT: 179 LBS | TEMPERATURE: 98.5 F | DIASTOLIC BLOOD PRESSURE: 77 MMHG | SYSTOLIC BLOOD PRESSURE: 127 MMHG | BODY MASS INDEX: 28.04 KG/M2 | HEART RATE: 76 BPM

## 2024-10-17 DIAGNOSIS — Z94.4 LIVER REPLACED BY TRANSPLANT (H): ICD-10-CM

## 2024-10-17 DIAGNOSIS — Z23 NEED FOR COVID-19 VACCINE: Primary | ICD-10-CM

## 2024-10-17 LAB — BACTERIA BLD CULT: NO GROWTH

## 2024-10-17 PROCEDURE — 250N000011 HC RX IP 250 OP 636: Performed by: INTERNAL MEDICINE

## 2024-10-17 PROCEDURE — 99204 OFFICE O/P NEW MOD 45 MIN: CPT | Performed by: INTERNAL MEDICINE

## 2024-10-17 PROCEDURE — 90480 ADMN SARSCOV2 VAC 1/ONLY CMP: CPT | Performed by: INTERNAL MEDICINE

## 2024-10-17 PROCEDURE — 91320 SARSCV2 VAC 30MCG TRS-SUC IM: CPT | Performed by: INTERNAL MEDICINE

## 2024-10-17 PROCEDURE — G0463 HOSPITAL OUTPT CLINIC VISIT: HCPCS | Performed by: INTERNAL MEDICINE

## 2024-10-17 RX ADMIN — COVID-19 VACCINE, MRNA 30 MCG: 0.04 INJECTION, SUSPENSION INTRAMUSCULAR at 10:39

## 2024-10-17 ASSESSMENT — PAIN SCALES - GENERAL: PAINLEVEL: NO PAIN (0)

## 2024-10-17 NOTE — PROGRESS NOTES
Solid Organ Transplant Hepatology Follow-Up Visit:     HISTORY OF PRESENT ILLNESS:   I had the pleasure of seeing Luz Thompson for followup in the Liver Clinic at the [facility] on October 17, 2024. Ms. Thompson returns for followup now more than 22 years status post liver transplantation for primary biliary cholangitis.      She is doing relatively well at this visit.  This seems as though her major problems recently have been recurrent urinary tract infections.  She has seen urology and is engaged with them.  She also has had some chronic kidney disease but that has been relatively stable.    At present, she denies any abdominal pain, itching or skin rash.  She has only mild fatigue.  She denies any fevers or chills, cough or shortness of breath.  She denies any nausea or vomiting.  She has had some occasional diarrhea.   She denies any increased abdominal girth or lower extremity edema.  She states her appetite is good, and her weight is up a few pounds.      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    Medications:   Current Outpatient Medications   Medication Sig Dispense Refill    apixaban ANTICOAGULANT (ELIQUIS ANTICOAGULANT) 2.5 MG tablet Take 1 tablet (2.5 mg) by mouth 2 times daily 180 tablet 3    azelastine (ASTELIN) 0.1 % nasal spray Spray 1 spray into both nostrils as needed for allergies or rhinitis.      calcitRIOL (ROCALTROL) 0.25 MCG capsule Take 1 capsule (0.25 mcg) by mouth daily. 90 capsule 3    D-MANNOSE PO Take 1 Scoop by mouth 2 times daily.      estradiol (ESTRACE) 0.1 MG/GM vaginal cream Place vaginally every other day.      evolocumab (REPATHA SURECLICK) 140 MG/ML prefilled autoinjector Inject 1 mL (140 mg) subcutaneously every 14 days. . Please have fasting labs drawn for further refills. 6 mL 3    ezetimibe (ZETIA) 10 MG tablet Take 1 tablet (10 mg) by mouth daily. 90 tablet 3    Ferrous Sulfate 324 MG  TBEC Take 1 tablet by mouth 2 times daily.      folic acid (FOLVITE) 1 MG tablet Take 1 tablet (1 mg) by mouth daily 100 tablet 0    furosemide (LASIX) 20 MG tablet Take 1 tablet (20 mg) by mouth daily 90 tablet 3    gabapentin (NEURONTIN) 250 MG/5ML solution Take 6 mLs (300 mg) by mouth at bedtime 180 mL 0    glucose (BD GLUCOSE) 5 g chewable tablet Take 2 tablets (10 g) by mouth as needed (low blood sugar) 40 tablet 1    hypromellose (ARTIFICIAL TEARS) 0.4 % SOLN ophthalmic solution Apply 1 drop to eye every hour as needed for dry eyes      levothyroxine (SYNTHROID/LEVOTHROID) 175 MCG tablet Take 1 tablet (175 mcg) by mouth daily. 90 tablet 3    linagliptin (TRADJENTA) 5 MG TABS tablet Take 1 tablet (5 mg) by mouth daily 90 tablet 1    losartan (COZAAR) 25 MG tablet Take 1 tablet (25 mg) by mouth daily. 30 tablet 3    meclizine (ANTIVERT) 25 MG tablet Take 1 tablet (25 mg) by mouth as needed for dizziness or other (migraines) 30 tablet 11    meropenem (MERREM) 1 g injection Inject 20 mLs (1 g) over 30 minutes into the vein every 12 hours for 11 days. Weekly CBC/diff,creat,SGOT to Alex fax  fax 22 each 0    metoprolol succinate ER (TOPROL XL) 25 MG 24 hr tablet Take 1 tablet (25 mg) by mouth daily. Take an extra tablet daily as needed for breakthrough afib. May repeat in two hours if heart rate remains >100bpm (no more than 3 tablets per day). 135 tablet 3    Multiple Vitamins-Minerals (PRESERVISION AREDS 2) CAPS Take 1 capsule by mouth 2 times daily      Nutrisource Fiber PO packet Take 1 packet by mouth daily.      omega-3 acid ethyl esters (LOVAZA) 1 g capsule Take 1 capsule by mouth 2 times daily 180 capsule 1    predniSONE (DELTASONE) 10 MG tablet Take 1 tablet (10 mg) by mouth daily. 90 tablet 3    sirolimus (GENERIC EQUIVALENT) 1 MG tablet Take 1 tablet (1 mg) by mouth daily. 90 tablet 3    ursodiol (ACTIGALL) 250 MG tablet Take 1 tablet (250 mg) by mouth 2 times daily 180 tablet 3     hydrALAZINE (APRESOLINE) 25 MG tablet Take 25 mg by mouth as needed (systolic bp >160).       No current facility-administered medications for this visit.        Vitals:   /77   Pulse 76   Temp 98.5  F (36.9  C) (Oral)   Wt 81.2 kg (179 lb)   LMP 06/01/1988 (Approximate)   SpO2 98%   BMI 28.04 kg/m      Physical Exam:   In general she looks fairly well. HEENT exam shows no scleral icterus or temporal muscle wasting. Chest is clear. Abdominal exam shows no increase in girth. No masses or tenderness to palpation are present. Liver is 10 cm in span without left lobe enlargement. No spleen tip is palpable.  Her incision is intact.  Extremity exam shows no edema. Skin exam shows no suspicious lesions and neurologic exam is nonfocal.     Labs:   Lab Results   Component Value Date     10/15/2024    POTASSIUM 4.2 10/15/2024    CHLORIDE 107 10/15/2024    ANIONGAP 12 10/15/2024    CO2 21 (L) 10/15/2024    BUN 34.3 (H) 10/15/2024    CR 1.42 (H) 10/15/2024    GFRESTIMATED 38 (L) 10/15/2024    KEILY 8.7 (L) 10/15/2024      Lab Results   Component Value Date    WBC 6.7 10/13/2024    HGB 10.4 (L) 10/13/2024    HCT 33.8 (L) 10/13/2024    MCV 96 10/13/2024    MCH 29.6 10/13/2024    MCHC 30.8 (L) 10/13/2024    RDW 15.0 10/13/2024     10/13/2024     Lab Results   Component Value Date    ALBUMIN 4.2 09/22/2024    ALKPHOS 146 09/22/2024    AST 21 09/30/2024    BILICONJ 0.0 07/24/2014     Lab Results   Component Value Date    INR 1.33 (H) 08/26/2019     Recent Labs   Lab Test 09/30/24  1545 09/22/24  0144 09/04/24  1008 06/15/23  1028 06/13/23  1820 06/24/22  1023 09/18/20  0000 08/30/19  0657 08/29/19  0544 08/28/19  0528 08/27/19  0532   ALKPHOS  --  146 100 108* 113* 166* 152 202* 218* 207* 198*   ALT 26 26 20 39 39 67* 78 72* 94* 112* 120*   AST 21 31 23 28 34 39 55 20 41 69* 87*      Imaging:   No images are attached to the encounter.     Assessment/Plan:   IMPRESSION:   My impression is that Ms. Thompson is  more than 22 years status post liver transplantation for primary biliary cholangitis and she is doing fairly well. She will need to follow-up with urology about her recurrent urinary tract infections.  She is just finishing up on her most recent course of antibiotics.  She does have some chronic kidney disease and does need to follow up with a nephrologist. Otherwise, her liver tests look good. I will not be making any change to her medical regimen. I will see her back in the clinic again.  I should point out that she is now living in the [place] full-time.  Has had a flu shot she and will get a COVID-vaccine today.    I did spend a total of 40 minutes (on the date of the encounter), including 30 minutes of face-to-face clinic time including counseling. The rest of the time was spent in documentation and review of records     Thank you very much for allowing me to participate in the care of this patient.  If you have any questions regarding my recommendations, please do not hesitate to contact me.         Gentry Ramirez MD      Professor of Medicine  [employer]      Executive Medical Director, Solid Organ Transplant Program  [facility]

## 2024-10-17 NOTE — NURSING NOTE
Chief Complaint   Patient presents with    RECHECK       /77   Pulse 76   Temp 98.5  F (36.9  C) (Oral)   Wt 81.2 kg (179 lb)   LMP 06/01/1988 (Approximate)   SpO2 98%   BMI 28.04 kg/m      Aubrey Thompson on 10/17/2024 at 10:07 AM

## 2024-10-17 NOTE — LETTER
10/17/2024      Luz Thompson  [address]  [facility] [address]      Dear Colleague,    Thank you for referring your patient, Luz Thompson, to the [facility] [place]. Please see a copy of my visit note below.    Solid Organ Transplant Hepatology Follow-Up Visit:     HISTORY OF PRESENT ILLNESS:   I had the pleasure of seeing Luz Thompson for followup in the Liver Clinic at the [facility] on October 17, 2024. Ms. Thompson returns for followup now more than 22 years status post liver transplantation for primary biliary cholangitis.      She is doing relatively well at this visit.  This seems as though her major problems recently have been recurrent urinary tract infections.  She has seen urology and is engaged with them.  She also has had some chronic kidney disease but that has been relatively stable.    At present, she denies any abdominal pain, itching or skin rash.  She has only mild fatigue.  She denies any fevers or chills, cough or shortness of breath.  She denies any nausea or vomiting.  She has had some occasional diarrhea.   She denies any increased abdominal girth or lower extremity edema.  She states her appetite is good, and her weight is up a few pounds.      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    Medications:   Current Outpatient Medications   Medication Sig Dispense Refill     apixaban ANTICOAGULANT (ELIQUIS ANTICOAGULANT) 2.5 MG tablet Take 1 tablet (2.5 mg) by mouth 2 times daily 180 tablet 3     azelastine (ASTELIN) 0.1 % nasal spray Spray 1 spray into both nostrils as needed for allergies or rhinitis.       calcitRIOL (ROCALTROL) 0.25 MCG capsule Take 1 capsule (0.25 mcg) by mouth daily. 90 capsule 3     D-MANNOSE PO Take 1 Scoop by mouth 2 times daily.       estradiol (ESTRACE) 0.1 MG/GM vaginal cream Place vaginally every other day.       evolocumab  (REPATHA SURECLICK) 140 MG/ML prefilled autoinjector Inject 1 mL (140 mg) subcutaneously every 14 days. . Please have fasting labs drawn for further refills. 6 mL 3     ezetimibe (ZETIA) 10 MG tablet Take 1 tablet (10 mg) by mouth daily. 90 tablet 3     Ferrous Sulfate 324 MG TBEC Take 1 tablet by mouth 2 times daily.       folic acid (FOLVITE) 1 MG tablet Take 1 tablet (1 mg) by mouth daily 100 tablet 0     furosemide (LASIX) 20 MG tablet Take 1 tablet (20 mg) by mouth daily 90 tablet 3     gabapentin (NEURONTIN) 250 MG/5ML solution Take 6 mLs (300 mg) by mouth at bedtime 180 mL 0     glucose (BD GLUCOSE) 5 g chewable tablet Take 2 tablets (10 g) by mouth as needed (low blood sugar) 40 tablet 1     hypromellose (ARTIFICIAL TEARS) 0.4 % SOLN ophthalmic solution Apply 1 drop to eye every hour as needed for dry eyes       levothyroxine (SYNTHROID/LEVOTHROID) 175 MCG tablet Take 1 tablet (175 mcg) by mouth daily. 90 tablet 3     linagliptin (TRADJENTA) 5 MG TABS tablet Take 1 tablet (5 mg) by mouth daily 90 tablet 1     losartan (COZAAR) 25 MG tablet Take 1 tablet (25 mg) by mouth daily. 30 tablet 3     meclizine (ANTIVERT) 25 MG tablet Take 1 tablet (25 mg) by mouth as needed for dizziness or other (migraines) 30 tablet 11     meropenem (MERREM) 1 g injection Inject 20 mLs (1 g) over 30 minutes into the vein every 12 hours for 11 days. Weekly CBC/diff,creat,SGOT to Alex fax  fax 22 each 0     metoprolol succinate ER (TOPROL XL) 25 MG 24 hr tablet Take 1 tablet (25 mg) by mouth daily. Take an extra tablet daily as needed for breakthrough afib. May repeat in two hours if heart rate remains >100bpm (no more than 3 tablets per day). 135 tablet 3     Multiple Vitamins-Minerals (PRESERVISION AREDS 2) CAPS Take 1 capsule by mouth 2 times daily       Nutrisource Fiber PO packet Take 1 packet by mouth daily.       omega-3 acid ethyl esters (LOVAZA) 1 g capsule Take 1 capsule by mouth 2 times daily 180 capsule 1      predniSONE (DELTASONE) 10 MG tablet Take 1 tablet (10 mg) by mouth daily. 90 tablet 3     sirolimus (GENERIC EQUIVALENT) 1 MG tablet Take 1 tablet (1 mg) by mouth daily. 90 tablet 3     ursodiol (ACTIGALL) 250 MG tablet Take 1 tablet (250 mg) by mouth 2 times daily 180 tablet 3     hydrALAZINE (APRESOLINE) 25 MG tablet Take 25 mg by mouth as needed (systolic bp >160).       No current facility-administered medications for this visit.        Vitals:   /77   Pulse 76   Temp 98.5  F (36.9  C) (Oral)   Wt 81.2 kg (179 lb)   LMP 06/01/1988 (Approximate)   SpO2 98%   BMI 28.04 kg/m      Physical Exam:   In general she looks fairly well. HEENT exam shows no scleral icterus or temporal muscle wasting. Chest is clear. Abdominal exam shows no increase in girth. No masses or tenderness to palpation are present. Liver is 10 cm in span without left lobe enlargement. No spleen tip is palpable.  Her incision is intact.  Extremity exam shows no edema. Skin exam shows no suspicious lesions and neurologic exam is nonfocal.     Labs:   Lab Results   Component Value Date     10/15/2024    POTASSIUM 4.2 10/15/2024    CHLORIDE 107 10/15/2024    ANIONGAP 12 10/15/2024    CO2 21 (L) 10/15/2024    BUN 34.3 (H) 10/15/2024    CR 1.42 (H) 10/15/2024    GFRESTIMATED 38 (L) 10/15/2024    KEILY 8.7 (L) 10/15/2024      Lab Results   Component Value Date    WBC 6.7 10/13/2024    HGB 10.4 (L) 10/13/2024    HCT 33.8 (L) 10/13/2024    MCV 96 10/13/2024    MCH 29.6 10/13/2024    MCHC 30.8 (L) 10/13/2024    RDW 15.0 10/13/2024     10/13/2024     Lab Results   Component Value Date    ALBUMIN 4.2 09/22/2024    ALKPHOS 146 09/22/2024    AST 21 09/30/2024    BILICONJ 0.0 07/24/2014     Lab Results   Component Value Date    INR 1.33 (H) 08/26/2019     Recent Labs   Lab Test 09/30/24  1545 09/22/24  0144 09/04/24  1008 06/15/23  1028 06/13/23  1820 06/24/22  1023 09/18/20  0000 08/30/19  0657 08/29/19  0544 08/28/19  0528  08/27/19  0532   ALKPHOS  --  146 100 108* 113* 166* 152 202* 218* 207* 198*   ALT 26 26 20 39 39 67* 78 72* 94* 112* 120*   AST 21 31 23 28 34 39 55 20 41 69* 87*      Imaging:   No images are attached to the encounter.     Assessment/Plan:   IMPRESSION:   My impression is that Ms. Thompson is more than 22 years status post liver transplantation for primary biliary cholangitis and she is doing fairly well. She will need to follow-up with urology about her recurrent urinary tract infections.  She is just finishing up on her most recent course of antibiotics.  She does have some chronic kidney disease and does need to follow up with a nephrologist. Otherwise, her liver tests look good. I will not be making any change to her medical regimen. I will see her back in the clinic again.  I should point out that she is now living in the [place] full-time.  Has had a flu shot she and will get a COVID-vaccine today.    I did spend a total of 40 minutes (on the date of the encounter), including 30 minutes of face-to-face clinic time including counseling. The rest of the time was spent in documentation and review of records     Thank you very much for allowing me to participate in the care of this patient.  If you have any questions regarding my recommendations, please do not hesitate to contact me.         Gentry Ramirez MD      Professor of Medicine  [employer]      Executive Medical Director, Solid Organ Transplant Program  [facility]      Again, thank you for allowing me to participate in the care of your patient.        Sincerely,        Gentry Ramirez MD

## 2024-10-21 ENCOUNTER — APPOINTMENT (OUTPATIENT)
Dept: LAB | Facility: CLINIC | Age: 74
End: 2024-10-21
Payer: MEDICARE

## 2024-10-21 DIAGNOSIS — Z94.4 LIVER TRANSPLANT STATUS (H): ICD-10-CM

## 2024-10-21 DIAGNOSIS — N30.00 ACUTE CYSTITIS WITHOUT HEMATURIA: ICD-10-CM

## 2024-10-21 LAB
ALBUMIN SERPL BCG-MCNC: 3.6 G/DL (ref 3.5–5.2)
ALP SERPL-CCNC: 136 U/L (ref 40–150)
ALT SERPL W P-5'-P-CCNC: 32 U/L (ref 0–50)
ANION GAP SERPL CALCULATED.3IONS-SCNC: 18 MMOL/L (ref 7–15)
AST SERPL W P-5'-P-CCNC: 28 U/L (ref 0–45)
BASOPHILS # BLD AUTO: 0 10E3/UL (ref 0–0.2)
BASOPHILS NFR BLD AUTO: 0 %
BILIRUB SERPL-MCNC: 0.3 MG/DL
BUN SERPL-MCNC: 30.3 MG/DL (ref 8–23)
CALCIUM SERPL-MCNC: 8.7 MG/DL (ref 8.8–10.4)
CHLORIDE SERPL-SCNC: 101 MMOL/L (ref 98–107)
CREAT SERPL-MCNC: 1.36 MG/DL (ref 0.51–0.95)
EGFRCR SERPLBLD CKD-EPI 2021: 40 ML/MIN/1.73M2
EOSINOPHIL # BLD AUTO: 0 10E3/UL (ref 0–0.7)
EOSINOPHIL NFR BLD AUTO: 0 %
ERYTHROCYTE [DISTWIDTH] IN BLOOD BY AUTOMATED COUNT: 14.6 % (ref 10–15)
GLUCOSE SERPL-MCNC: 115 MG/DL (ref 70–99)
HCO3 SERPL-SCNC: 21 MMOL/L (ref 22–29)
HCT VFR BLD AUTO: 37.9 % (ref 35–47)
HGB BLD-MCNC: 11.9 G/DL (ref 11.7–15.7)
IMM GRANULOCYTES # BLD: 0.1 10E3/UL
IMM GRANULOCYTES NFR BLD: 1 %
LYMPHOCYTES # BLD AUTO: 1.1 10E3/UL (ref 0.8–5.3)
LYMPHOCYTES NFR BLD AUTO: 13 %
MCH RBC QN AUTO: 29.1 PG (ref 26.5–33)
MCHC RBC AUTO-ENTMCNC: 31.4 G/DL (ref 31.5–36.5)
MCV RBC AUTO: 93 FL (ref 78–100)
MONOCYTES # BLD AUTO: 0.5 10E3/UL (ref 0–1.3)
MONOCYTES NFR BLD AUTO: 6 %
NEUTROPHILS # BLD AUTO: 7.2 10E3/UL (ref 1.6–8.3)
NEUTROPHILS NFR BLD AUTO: 81 %
NRBC # BLD AUTO: 0 10E3/UL
NRBC BLD AUTO-RTO: 0 /100
PLATELET # BLD AUTO: 329 10E3/UL (ref 150–450)
POTASSIUM SERPL-SCNC: 4.5 MMOL/L (ref 3.4–5.3)
PROT SERPL-MCNC: 6.6 G/DL (ref 6.4–8.3)
RBC # BLD AUTO: 4.09 10E6/UL (ref 3.8–5.2)
SODIUM SERPL-SCNC: 140 MMOL/L (ref 135–145)
WBC # BLD AUTO: 9 10E3/UL (ref 4–11)

## 2024-10-22 ENCOUNTER — TELEPHONE (OUTPATIENT)
Dept: ENDOCRINOLOGY | Facility: CLINIC | Age: 74
End: 2024-10-22

## 2024-10-22 NOTE — TELEPHONE ENCOUNTER
----- Message from Ami QUINONEZ sent at 10/22/2024  9:17 AM CDT -----  Regarding: lab draw  Please fax the 9/9/24 lab orders by Dr Masterson  due 11/9/24 to  [facility]  Fax # [phone] thank you  Ami LAZCANO

## 2024-10-23 ENCOUNTER — DOCUMENTATION ONLY (OUTPATIENT)
Dept: CARDIOLOGY | Facility: CLINIC | Age: 74
End: 2024-10-23
Payer: MEDICARE

## 2024-10-23 NOTE — TELEPHONE ENCOUNTER
RECORDS RECEIVED FROM: Care Everywhere   REASON FOR VISIT: Cerebral aneurysm   PROVIDER: Tereza Richards APRN CNP   DATE OF APPT: 11/26/24 @ 3:30 pm    NOTES (FOR ALL VISITS) STATUS DETAILS   OFFICE NOTE from referring provider Internal 6/21/24 (Orders Only) Angelika Frausto, RN @Transplant Clinic   DISCHARGE SUMMARY from hospital In process 11/24/23-11/28/23 June Proctor MD  @[facility]     DISCHARGE REPORT from the ER Care Everywhere 1/15/23-1/16/23 Kobi Spear MD  @[facility]   MEDICATION LIST Internal    IMAGING  (FOR ALL VISITS)     MRI (HEAD, NECK, SPINE) Received [facility]  5/3/23 MR Brain  1/15/23 MR Brain     CT (HEAD, NECK, SPINE) Received [facility]  11/7/23 CT Head  1/15/23 CT Head  1/15/23 CTA Head Neck

## 2024-10-23 NOTE — PROGRESS NOTES
Dr. Zuluaga wants cardiac work up from [place]. New records request sent to [facility] in [place], Dr. Luigi Philippe @ [phone].

## 2024-10-24 ENCOUNTER — HOSPITAL ENCOUNTER (OUTPATIENT)
Dept: RESEARCH | Facility: CLINIC | Age: 74
Discharge: HOME | End: 2024-10-24
Attending: INTERNAL MEDICINE
Payer: MEDICARE

## 2024-10-24 DIAGNOSIS — Z00.6 CLINICAL TRIAL PARTICIPANT: Primary | ICD-10-CM

## 2024-10-24 PROCEDURE — 510N000009 HC RESEARCH FACILITY, PER 15 MIN

## 2024-10-24 PROCEDURE — 510N000017 HC CRU PATIENT CARE, PER 15 MIN

## 2024-10-28 ENCOUNTER — TELEPHONE (OUTPATIENT)
Dept: CARDIOLOGY | Facility: CLINIC | Age: 74
End: 2024-10-28
Payer: MEDICARE

## 2024-10-28 NOTE — TELEPHONE ENCOUNTER
Kirill Zuluaga MD Anderson, Barbara E RN36 minutes ago (12:54 PM)     All looks good.  Continue to follow in device clinic and follow-up in 1 year.

## 2024-10-28 NOTE — TELEPHONE ENCOUNTER
Received office note, device check, echo and [facility] [place].   Records sent to scan  Update sent to Dr. Zuluaga

## 2024-10-29 ENCOUNTER — HOSPITAL ENCOUNTER (EMERGENCY)
Facility: CLINIC | Age: 74
Discharge: HOME | End: 2024-10-29
Attending: SOCIAL WORKER | Admitting: SOCIAL WORKER
Payer: MEDICARE

## 2024-10-29 VITALS
DIASTOLIC BLOOD PRESSURE: 71 MMHG | HEART RATE: 80 BPM | TEMPERATURE: 98.5 F | RESPIRATION RATE: 18 BRPM | SYSTOLIC BLOOD PRESSURE: 166 MMHG | OXYGEN SATURATION: 98 %

## 2024-10-29 DIAGNOSIS — N30.00 ACUTE CYSTITIS WITHOUT HEMATURIA: ICD-10-CM

## 2024-10-29 DIAGNOSIS — Z94.4 LIVER TRANSPLANT STATUS (H): ICD-10-CM

## 2024-10-29 DIAGNOSIS — I10 HYPERTENSION, UNSPECIFIED TYPE: ICD-10-CM

## 2024-10-29 DIAGNOSIS — R89.9 ABNORMAL LABORATORY TEST: ICD-10-CM

## 2024-10-29 LAB
ALBUMIN SERPL BCG-MCNC: 3.6 G/DL (ref 3.5–5.2)
ALP SERPL-CCNC: 132 U/L (ref 40–150)
ALT SERPL W P-5'-P-CCNC: 19 U/L (ref 0–50)
ANION GAP SERPL CALCULATED.3IONS-SCNC: 11 MMOL/L (ref 7–15)
ANION GAP SERPL CALCULATED.3IONS-SCNC: 13 MMOL/L (ref 7–15)
AST SERPL W P-5'-P-CCNC: 18 U/L (ref 0–45)
BASOPHILS # BLD AUTO: 0.1 10E3/UL (ref 0–0.2)
BASOPHILS NFR BLD AUTO: 1 %
BILIRUB SERPL-MCNC: 0.2 MG/DL
BUN SERPL-MCNC: 30.8 MG/DL (ref 8–23)
BUN SERPL-MCNC: 34.3 MG/DL (ref 8–23)
CALCIUM SERPL-MCNC: 6.5 MG/DL (ref 8.8–10.4)
CALCIUM SERPL-MCNC: 9.3 MG/DL (ref 8.8–10.4)
CHLORIDE SERPL-SCNC: 104 MMOL/L (ref 98–107)
CHLORIDE SERPL-SCNC: 106 MMOL/L (ref 98–107)
CREAT SERPL-MCNC: 1.45 MG/DL (ref 0.51–0.95)
CREAT SERPL-MCNC: 1.46 MG/DL (ref 0.51–0.95)
EGFRCR SERPLBLD CKD-EPI 2021: 37 ML/MIN/1.73M2
EGFRCR SERPLBLD CKD-EPI 2021: 37 ML/MIN/1.73M2
EOSINOPHIL # BLD AUTO: 0.1 10E3/UL (ref 0–0.7)
EOSINOPHIL NFR BLD AUTO: 1 %
ERYTHROCYTE [DISTWIDTH] IN BLOOD BY AUTOMATED COUNT: 15.3 % (ref 10–15)
GLUCOSE SERPL-MCNC: 100 MG/DL (ref 70–99)
GLUCOSE SERPL-MCNC: 155 MG/DL (ref 70–99)
HCO3 SERPL-SCNC: 25 MMOL/L (ref 22–29)
HCO3 SERPL-SCNC: 26 MMOL/L (ref 22–29)
HCT VFR BLD AUTO: 34.8 % (ref 35–47)
HGB BLD-MCNC: 10.9 G/DL (ref 11.7–15.7)
IMM GRANULOCYTES # BLD: 0.2 10E3/UL
IMM GRANULOCYTES NFR BLD: 2 %
LYMPHOCYTES # BLD AUTO: 2.7 10E3/UL (ref 0.8–5.3)
LYMPHOCYTES NFR BLD AUTO: 26 %
MCH RBC QN AUTO: 29.1 PG (ref 26.5–33)
MCHC RBC AUTO-ENTMCNC: 31.3 G/DL (ref 31.5–36.5)
MCV RBC AUTO: 93 FL (ref 78–100)
MONOCYTES # BLD AUTO: 1.1 10E3/UL (ref 0–1.3)
MONOCYTES NFR BLD AUTO: 11 %
NEUTROPHILS # BLD AUTO: 6.2 10E3/UL (ref 1.6–8.3)
NEUTROPHILS NFR BLD AUTO: 60 %
NRBC # BLD AUTO: 0 10E3/UL
NRBC BLD AUTO-RTO: 0 /100
PLATELET # BLD AUTO: 443 10E3/UL (ref 150–450)
POTASSIUM SERPL-SCNC: 4.2 MMOL/L (ref 3.4–5.3)
POTASSIUM SERPL-SCNC: 6.1 MMOL/L (ref 3.4–5.3)
PROT SERPL-MCNC: 6.1 G/DL (ref 6.4–8.3)
RBC # BLD AUTO: 3.75 10E6/UL (ref 3.8–5.2)
SODIUM SERPL-SCNC: 142 MMOL/L (ref 135–145)
SODIUM SERPL-SCNC: 143 MMOL/L (ref 135–145)
WBC # BLD AUTO: 10.3 10E3/UL (ref 4–11)

## 2024-10-29 PROCEDURE — 250N000013 HC RX MED GY IP 250 OP 250 PS 637: Performed by: SOCIAL WORKER

## 2024-10-29 PROCEDURE — 82947 ASSAY GLUCOSE BLOOD QUANT: CPT | Performed by: SOCIAL WORKER

## 2024-10-29 PROCEDURE — 36415 COLL VENOUS BLD VENIPUNCTURE: CPT

## 2024-10-29 PROCEDURE — 84132 ASSAY OF SERUM POTASSIUM: CPT | Performed by: SOCIAL WORKER

## 2024-10-29 PROCEDURE — 99284 EMERGENCY DEPT VISIT MOD MDM: CPT | Performed by: SOCIAL WORKER

## 2024-10-29 PROCEDURE — 82565 ASSAY OF CREATININE: CPT | Performed by: SOCIAL WORKER

## 2024-10-29 PROCEDURE — 85025 COMPLETE CBC W/AUTO DIFF WBC: CPT

## 2024-10-29 PROCEDURE — 80053 COMPREHEN METABOLIC PANEL: CPT

## 2024-10-29 PROCEDURE — 93005 ELECTROCARDIOGRAM TRACING: CPT | Performed by: SOCIAL WORKER

## 2024-10-29 PROCEDURE — 36415 COLL VENOUS BLD VENIPUNCTURE: CPT | Performed by: SOCIAL WORKER

## 2024-10-29 RX ORDER — LOSARTAN POTASSIUM 25 MG/1
25 TABLET ORAL ONCE
Status: COMPLETED | OUTPATIENT
Start: 2024-10-29 | End: 2024-10-29

## 2024-10-29 RX ORDER — METOPROLOL TARTRATE 25 MG/1
25 TABLET, FILM COATED ORAL ONCE
Status: COMPLETED | OUTPATIENT
Start: 2024-10-29 | End: 2024-10-29

## 2024-10-29 RX ADMIN — METOPROLOL TARTRATE 25 MG: 25 TABLET, FILM COATED ORAL at 19:09

## 2024-10-29 RX ADMIN — LOSARTAN POTASSIUM 25 MG: 25 TABLET, FILM COATED ORAL at 20:08

## 2024-10-29 ASSESSMENT — ACTIVITIES OF DAILY LIVING (ADL)
ADLS_ACUITY_SCORE: 0

## 2024-10-29 NOTE — ED PROVIDER NOTES
Emergency Department Note      History of Present Illness     Chief Complaint   Hyperkalemia      HPI   Luz Thompson is a 74 year old female on Eliquis for a-fib with a history of CKD, CVA, DVT, HLD, HTN, PAD, Sjogren's, and type II diabetes who presents to the ED for the evaluation of hyperkalemia. The patient reports that she had a midline in for recurrent UTI (she is allergic to most antibiotics and had midline for meropenem) and it was taken out today. Her doctor wanted weekly labs. Today, a home nurse came to draw labs. She was then called and referred to ED due to results. She mentions she hasn't had her blood pressure or diabetes medications. She has atrial tachycardia and has a loop recorder in. Also mentions history of CKD. She feels entirely well and has no complaints at all. No recent fever, cough, vomiting, diarrhea, or shortness of breath. She would like coffee as she has not been able to have it today yet    Independent Historian   None    Review of External Notes   I reviewed the cardiology phone call from 10/28/24: Loop recorder without events   I reviewed the hospital admission from 10/11-10/15: Complicated UTI and treated with meropenem via midline catheter     Past Medical History     Medical History and Problem List   DERREK  Anemia  Anisometropia  Anxiety  Astigmatism  Atrial fibrillation  Biliary cirrhosis  Cataract  Cellulitis  CKD stage III  Coccidioidomycosis   CVA  Diabetes mellitus type II  Diabetic nephropathy  Diverticulosis  DVT  EBV  Eczema   GERD  Glaucoma  HLD  HTN  Lung infection  Macular degeneration  MDD  Migraine  RASHIDA  Osteoarthritis  Osteomyelitis  Osteoporosis  PAD  Presbyopia  Sepsis   Sjogren's syndrome  Thyroid cancer    Medications   apixaban  evolocumab  ezetimibe  furosemide  gabapentin  levothyroxine  linagliptin   losartan  meclizine  metoprolol succinate  prednisone  sirolimus   ursodiol     Surgical History   Appendectomy  Cataract extraction & IOL implant  B/L  Cholecystectomy  PICC insertion  Thyroidectomy  Transplant liver  Tympanoplasty  Varicose vein removal    Physical Exam     Patient Vitals for the past 24 hrs:   BP Temp Temp src Pulse Resp SpO2   10/29/24 2054 (!) 166/71 98.5  F (36.9  C) Oral -- 18 --   10/29/24 1908 (!) 178/108 -- -- 80 -- --   10/29/24 1838 (!) 195/90 98.6  F (37  C) Oral 78 18 98 %     Physical Exam  General: Overall stable and nontoxic appearing  HEENT: Conjunctivae clear, no scleral icterus, mucous membranes moist  Neuro: Alert, moving all extremities equally with intention  CV: Regular rate and rhythm, radial and DP pulses equal  Respiratory: No signs of respiratory distress, lungs clear to auscultation bilaterally   Abdomen: Soft, without rigidity or rebound throughout  MSK: Blt mild erythema, patient states is not new and ends at the sock line     Diagnostics     Lab Results   Labs Ordered and Resulted from Time of ED Arrival to Time of ED Departure   BASIC METABOLIC PANEL - Abnormal       Result Value    Sodium 143      Potassium 4.2      Chloride 106      Carbon Dioxide (CO2) 26      Anion Gap 11      Urea Nitrogen 34.3 (*)     Creatinine 1.45 (*)     GFR Estimate 37 (*)     Calcium 9.3      Glucose 100 (*)        Imaging   No orders to display       EKG   EKG 1838  Sinus rhythm no signs of ischemia, RI is longer than prior in EKG in September, 176 at that time however not prolonged   Rate 80  QRS 84 QTc 442    Independent Interpretation   None    ED Course      Medications Administered   Medications   metoprolol tartrate (LOPRESSOR) tablet 25 mg (25 mg Oral $Given 10/29/24 1909)   losartan (COZAAR) tablet 25 mg (25 mg Oral $Given 10/29/24 2008)       Procedures   Procedures     Discussion of Management   None    ED Course   ED Course as of 10/30/24 1123   Tue Oct 29, 2024   1847 I evaluated the patient and obtained history.    2004 I rechecked and updated the patient.    2027 I rechecked and updated the patient.         Additional Documentation  None    Medical Decision Making / Diagnosis     CMS Diagnoses: None    MIPS       None    [facility]   Luz Thompson is a 74 year old female who presents to the ED for elevated potassium on lab draw done at home by home health nurse. Patient entirely asymptomatic and feels at her baseline. On review of this, potassium was 6.1. Here in the ED repeat shows potassium of 4.2. Patient's EKG shows mildly longer IL however it is not prolonged and no peaked T waves. Cr appears grossly similar to baseline. She is making urine without issues. Patient's BP was elevated however she missed her BP meds and BP decrease with med administration. No clinical signs of hypertensive emergency to warrant emergent lowering with IV antihypertensive medications. Recommended recheck of labs with PCP within the week as well as BP check.  Return precautions discussed and patient verbalized understanding, comfortable with the plan.     Disposition   The patient was discharged.     Diagnosis     ICD-10-CM    1. Abnormal laboratory test  R89.9       2. Hypertension, unspecified type  I10            Discharge Medications   Discharge Medication List as of 10/29/2024  9:00 PM            Scribe Disclosure:  I, Katherine Avila, am serving as a scribe at 6:44 PM on 10/29/2024 to document services personally performed by Ami Irwin MD based on my observations and the provider's statements to me.        Ami Irwin MD  10/30/24 1128

## 2024-10-30 LAB
ATRIAL RATE - MUSE: 80 BPM
DIASTOLIC BLOOD PRESSURE - MUSE: NORMAL MMHG
INTERPRETATION ECG - MUSE: NORMAL
P AXIS - MUSE: 37 DEGREES
PR INTERVAL - MUSE: 200 MS
QRS DURATION - MUSE: 84 MS
QT - MUSE: 384 MS
QTC - MUSE: 442 MS
R AXIS - MUSE: 13 DEGREES
SYSTOLIC BLOOD PRESSURE - MUSE: NORMAL MMHG
T AXIS - MUSE: 47 DEGREES
VENTRICULAR RATE- MUSE: 80 BPM

## 2024-10-30 NOTE — DISCHARGE INSTRUCTIONS
You were seen in the emergency department for an elevated lab. We repeated your lab here in the ED and it looks like your potassium is normal. Please call your primary care doctor to schedule a follow-up appointment so that you can have a repeat check of your potassium again in the next week. Please also discuss your blood pressure which was elevated here. We gave you your blood pressure medications that you missed today.     Come back to the emergency department if you have chest pain, palpitations, inability urinate or other concerning symptoms.

## 2024-10-30 NOTE — ED TRIAGE NOTES
Pt BIBA  after home care told her to go to ED. Pt potassium is 6.1 and systolic BP elevated into the 200's systolic.  per EMS. 18 G IV placed by EMS to LAC. PT EKG was SR. Temp 98.6f upon arrival. Pt A&O X 4. Pt was recently in hospital for UTI. Finished abx course on Sunday.      Triage Assessment (Adult)       Row Name 10/29/24 0611          Triage Assessment    Airway WDL WDL        Respiratory WDL    Respiratory WDL WDL        Skin Circulation/Temperature WDL    Skin Circulation/Temperature WDL WDL        Cardiac WDL    Cardiac WDL WDL     Cardiac Rhythm NSR        Peripheral/Neurovascular WDL    Peripheral Neurovascular WDL WDL        Cognitive/Neuro/Behavioral WDL    Cognitive/Neuro/Behavioral WDL WDL

## 2024-10-31 NOTE — DISCHARGE INSTRUCTIONS
[facility] will provide your Home Nursing Visits, their contact number is [phone].    [facility] will be providing your PT and OT visits, their contact number is [phone].    [facility] will supply your IV medication and supplies. Their contact number is [phone].  
Detail Level: Generalized
Sunscreen Recommendations: Cerave, cetaphil, Eucerin sunscreen, Elta MD daily
Detail Level: Simple
Detail Level: Detailed

## 2024-11-01 ENCOUNTER — LAB (OUTPATIENT)
Dept: LAB | Facility: CLINIC | Age: 74
End: 2024-11-01
Payer: MEDICARE

## 2024-11-01 DIAGNOSIS — N30.00 ACUTE CYSTITIS WITHOUT HEMATURIA: ICD-10-CM

## 2024-11-01 DIAGNOSIS — Z94.4 LIVER TRANSPLANT STATUS (H): ICD-10-CM

## 2024-11-01 LAB
ALBUMIN SERPL BCG-MCNC: 3.7 G/DL (ref 3.5–5.2)
ALP SERPL-CCNC: 164 U/L (ref 40–150)
ALT SERPL W P-5'-P-CCNC: 38 U/L (ref 0–50)
ANION GAP SERPL CALCULATED.3IONS-SCNC: 13 MMOL/L (ref 7–15)
AST SERPL W P-5'-P-CCNC: 27 U/L (ref 0–45)
BASOPHILS # BLD AUTO: 0 10E3/UL (ref 0–0.2)
BASOPHILS NFR BLD AUTO: 0 %
BILIRUB SERPL-MCNC: 0.4 MG/DL
BUN SERPL-MCNC: 40.8 MG/DL (ref 8–23)
CALCIUM SERPL-MCNC: 8 MG/DL (ref 8.8–10.4)
CHLORIDE SERPL-SCNC: 103 MMOL/L (ref 98–107)
CREAT SERPL-MCNC: 1.66 MG/DL (ref 0.51–0.95)
EGFRCR SERPLBLD CKD-EPI 2021: 32 ML/MIN/1.73M2
EOSINOPHIL # BLD AUTO: 0 10E3/UL (ref 0–0.7)
EOSINOPHIL NFR BLD AUTO: 0 %
ERYTHROCYTE [DISTWIDTH] IN BLOOD BY AUTOMATED COUNT: 15.3 % (ref 10–15)
GLUCOSE SERPL-MCNC: 195 MG/DL (ref 70–99)
HCO3 SERPL-SCNC: 22 MMOL/L (ref 22–29)
HCT VFR BLD AUTO: 33.5 % (ref 35–47)
HGB BLD-MCNC: 10.7 G/DL (ref 11.7–15.7)
IMM GRANULOCYTES # BLD: 0.1 10E3/UL
IMM GRANULOCYTES NFR BLD: 1 %
LYMPHOCYTES # BLD AUTO: 1.1 10E3/UL (ref 0.8–5.3)
LYMPHOCYTES NFR BLD AUTO: 11 %
MCH RBC QN AUTO: 29.5 PG (ref 26.5–33)
MCHC RBC AUTO-ENTMCNC: 31.9 G/DL (ref 31.5–36.5)
MCV RBC AUTO: 92 FL (ref 78–100)
MONOCYTES # BLD AUTO: 0.6 10E3/UL (ref 0–1.3)
MONOCYTES NFR BLD AUTO: 6 %
NEUTROPHILS # BLD AUTO: 8 10E3/UL (ref 1.6–8.3)
NEUTROPHILS NFR BLD AUTO: 82 %
NRBC # BLD AUTO: 0 10E3/UL
NRBC BLD AUTO-RTO: 0 /100
PLATELET # BLD AUTO: 373 10E3/UL (ref 150–450)
POTASSIUM SERPL-SCNC: 5.4 MMOL/L (ref 3.4–5.3)
PROT SERPL-MCNC: 6.4 G/DL (ref 6.4–8.3)
RBC # BLD AUTO: 3.63 10E6/UL (ref 3.8–5.2)
SODIUM SERPL-SCNC: 138 MMOL/L (ref 135–145)
WBC # BLD AUTO: 9.8 10E3/UL (ref 4–11)

## 2024-11-01 PROCEDURE — 85004 AUTOMATED DIFF WBC COUNT: CPT

## 2024-11-01 PROCEDURE — 36415 COLL VENOUS BLD VENIPUNCTURE: CPT

## 2024-11-01 PROCEDURE — 80053 COMPREHEN METABOLIC PANEL: CPT

## 2024-11-04 DIAGNOSIS — E87.5 HYPERKALEMIA: Primary | ICD-10-CM

## 2024-11-05 ENCOUNTER — TELEPHONE (OUTPATIENT)
Dept: FAMILY MEDICINE | Facility: CLINIC | Age: 74
End: 2024-11-05
Payer: MEDICARE

## 2024-11-05 ENCOUNTER — TELEPHONE (OUTPATIENT)
Dept: UROLOGY | Facility: CLINIC | Age: 74
End: 2024-11-05
Payer: MEDICARE

## 2024-11-05 NOTE — TELEPHONE ENCOUNTER
Health Call Center    Phone Message    May a detailed message be left on voicemail: yes     Reason for Call: Other: Patient calls to schedule a nurse visit for a cath urine sample for UA/UC. Patient states that she was told that this could be scheduled in [place] and should try to be seen this afternoon or tomorrow to complete this. Sending TE per protocols. Please call patient to schedule.     Action Taken: Message routed to:  Other: Urology    Travel Screening: Not Applicable     Date of Service:

## 2024-11-05 NOTE — TELEPHONE ENCOUNTER
Attempt # 1    Called #   Telephone Information:   Mobile [phone]         Left a non detailed VM     Maria Luisa Moser RN, BSN  [facility] T[facility]

## 2024-11-05 NOTE — TELEPHONE ENCOUNTER
----- Message from Eben RAJEEVManny Reyes sent at 11/4/2024 10:27 PM CST -----  Regarding: FW: Labs  Recommend rechecking metabolic panel this week. She should make sure she is drinking enough fluids and limiting high potassium foods. Please schedule lab-only visit or if she currently has home care - - can they draw blood work?    Eben Reyes, DO  11/4/2024 10:34 PM  ----- Message -----  From: Ngozi Moy RN  Sent: 11/4/2024   3:43 PM CST  To: Gentry Ramirez MD; Angelika Frausto RN; #  Subject: Labs                                             Hi,  [place]ia was under the care of Dr Johnson [facility] Infectious Disease.  While she was treated with IV antibiotics Dr Johnson had been monitoring some labs. She has finished her IV therapy however we are still receiving labs on her, CBC and CMP. She had been in the ER 10/29/24 due to elevated potassium and it was recommended that she have a repeat check.   The results were forwarded to Dr Johnson and as her IV therapy is complete we will not be monitoring labs.    It looks like her potassium is elevated again so I wanted to make sure someone was aware of her results.  Thank you  NENO Melvin  [facility]

## 2024-11-06 ENCOUNTER — TELEPHONE (OUTPATIENT)
Dept: TRANSPLANT | Facility: CLINIC | Age: 74
End: 2024-11-06

## 2024-11-06 ENCOUNTER — ALLIED HEALTH/NURSE VISIT (OUTPATIENT)
Dept: UROLOGY | Facility: CLINIC | Age: 74
End: 2024-11-06
Payer: MEDICARE

## 2024-11-06 DIAGNOSIS — R39.89 SUSPECTED UTI: Primary | ICD-10-CM

## 2024-11-06 DIAGNOSIS — Z94.4 LIVER REPLACED BY TRANSPLANT (H): Primary | ICD-10-CM

## 2024-11-06 DIAGNOSIS — N30.20 BLADDER INFECTION, CHRONIC: ICD-10-CM

## 2024-11-06 LAB
ALBUMIN UR-MCNC: 30 MG/DL
APPEARANCE UR: CLEAR
BILIRUB UR QL STRIP: NEGATIVE
COLOR UR AUTO: YELLOW
GLUCOSE UR STRIP-MCNC: NEGATIVE MG/DL
HGB UR QL STRIP: NEGATIVE
KETONES UR STRIP-MCNC: NEGATIVE MG/DL
LEUKOCYTE ESTERASE UR QL STRIP: ABNORMAL
NITRATE UR QL: NEGATIVE
PH UR STRIP: 5.5 [PH] (ref 5–7)
SP GR UR STRIP: 1.01 (ref 1–1.03)
UROBILINOGEN UR STRIP-ACNC: 0.2 E.U./DL

## 2024-11-06 PROCEDURE — 99207 PR NO CHARGE NURSE ONLY: CPT

## 2024-11-06 PROCEDURE — 87086 URINE CULTURE/COLONY COUNT: CPT

## 2024-11-06 PROCEDURE — 81003 URINALYSIS AUTO W/O SCOPE: CPT | Mod: QW

## 2024-11-06 NOTE — PROGRESS NOTES
Luz Thompson comes into clinic today at the request of Edwina Riddle PA-C Ordering Provider for cathed UAUC.    Pt presents to clinic today for cathed UAUC for suspected UTI. Pt was prepped in typical sterile fashion, a well lubricated 14 fr straight female catheter was passed to obtain a urine specimen. This was sent for culture today.    This service provided today was under the supervising provider of the day Cherie Mujica PA-C, who was available if needed.    Cailin Kinney CMA

## 2024-11-07 NOTE — TELEPHONE ENCOUNTER
Attempt # 1    Called # [phone]     Left a non detailed VM to call back at [phone] and ask for any available Triage Nurse.    Patient has lab visit scheduled for Friday 11/8. See provider note below  Recommend rechecking metabolic panel this week. She should make sure she is drinking enough fluids and limiting high potassium foods. Please schedule lab-only visit or if she currently has home care - - can they draw blood work?     DONN FERRELL RN on 11/7/2024 at 11:33 AM   [facility]

## 2024-11-07 NOTE — TELEPHONE ENCOUNTER
Pt called back explained below. She will get CMP drawn tomorrow with her other labs. Sending mychart with advise on high potassium foods.     Ayse Thompson RN [facility]

## 2024-11-08 LAB — BACTERIA UR CULT: NO GROWTH

## 2024-11-11 ENCOUNTER — TELEPHONE (OUTPATIENT)
Dept: FAMILY MEDICINE | Facility: CLINIC | Age: 74
End: 2024-11-11

## 2024-11-11 ENCOUNTER — TELEPHONE (OUTPATIENT)
Dept: ENDOCRINOLOGY | Facility: CLINIC | Age: 74
End: 2024-11-11

## 2024-11-11 NOTE — TELEPHONE ENCOUNTER
Provider notified.   Anna Monreal, RN on 11/11/2024 at 12:33 PM         Sammy, Allan ACUÑA RN16 minutes ago (12:10 PM)     TC  Patient calls, has a question regarding having a DEXA scan done. She has a Cardiac loop in her Upper Left part of her chest. Is wondering if the DEXA scan would interferew with this. Will forward to ordering provider and team

## 2024-11-11 NOTE — TELEPHONE ENCOUNTER
Patient calls, has a question regarding having a DEXA scan done. She has a Cardiac loop in her Upper Left part of her chest. Is wondering if the DEXA scan would interferew with this. Will forward to ordering provider and team.

## 2024-11-12 ENCOUNTER — TELEPHONE (OUTPATIENT)
Dept: INFECTIOUS DISEASES | Facility: CLINIC | Age: 74
End: 2024-11-12
Payer: MEDICARE

## 2024-11-12 DIAGNOSIS — N39.0 URINARY TRACT INFECTION: ICD-10-CM

## 2024-11-12 RX ORDER — GRANULES FOR ORAL 3 G/1
3 POWDER ORAL EVERY OTHER DAY
Qty: 3 PACKET | Refills: 0 | Status: SHIPPED | OUTPATIENT
Start: 2024-11-12

## 2024-11-12 NOTE — TELEPHONE ENCOUNTER
EP called 11/12 to offer pt to be seen sooner than 12/2 with Dr. Lundberg with any TID provider. Patient said she couldn't do any of the days that were provided so staying with 12/2 with Dr. Lundberg.

## 2024-11-14 ENCOUNTER — LAB (OUTPATIENT)
Dept: LAB | Facility: CLINIC | Age: 74
End: 2024-11-14
Payer: MEDICARE

## 2024-11-14 DIAGNOSIS — E87.5 HYPERKALEMIA: ICD-10-CM

## 2024-11-14 DIAGNOSIS — M81.8 OTHER OSTEOPOROSIS WITHOUT CURRENT PATHOLOGICAL FRACTURE: ICD-10-CM

## 2024-11-14 DIAGNOSIS — Z94.4 LIVER TRANSPLANT STATUS (H): ICD-10-CM

## 2024-11-14 DIAGNOSIS — N30.00 ACUTE CYSTITIS WITHOUT HEMATURIA: ICD-10-CM

## 2024-11-14 DIAGNOSIS — E89.0 POSTOPERATIVE HYPOTHYROIDISM: ICD-10-CM

## 2024-11-14 LAB
ALBUMIN SERPL BCG-MCNC: 3.9 G/DL (ref 3.5–5.2)
ALP SERPL-CCNC: 126 U/L (ref 40–150)
ALT SERPL W P-5'-P-CCNC: 27 U/L (ref 0–50)
ANION GAP SERPL CALCULATED.3IONS-SCNC: 12 MMOL/L (ref 7–15)
AST SERPL W P-5'-P-CCNC: 22 U/L (ref 0–45)
BASOPHILS # BLD AUTO: 0 10E3/UL (ref 0–0.2)
BASOPHILS NFR BLD AUTO: 0 %
BILIRUB SERPL-MCNC: 0.4 MG/DL
BUN SERPL-MCNC: 42.1 MG/DL (ref 8–23)
CA-I BLD-MCNC: 4.8 MG/DL (ref 4.4–5.2)
CALCIUM SERPL-MCNC: 9.6 MG/DL (ref 8.8–10.4)
CHLORIDE SERPL-SCNC: 107 MMOL/L (ref 98–107)
CREAT SERPL-MCNC: 1.56 MG/DL (ref 0.51–0.95)
EGFRCR SERPLBLD CKD-EPI 2021: 34 ML/MIN/1.73M2
EOSINOPHIL # BLD AUTO: 0.1 10E3/UL (ref 0–0.7)
EOSINOPHIL NFR BLD AUTO: 1 %
ERYTHROCYTE [DISTWIDTH] IN BLOOD BY AUTOMATED COUNT: 15.7 % (ref 10–15)
GLUCOSE SERPL-MCNC: 136 MG/DL (ref 70–99)
HCO3 SERPL-SCNC: 21 MMOL/L (ref 22–29)
HCT VFR BLD AUTO: 36.4 % (ref 35–47)
HGB BLD-MCNC: 11.8 G/DL (ref 11.7–15.7)
IMM GRANULOCYTES # BLD: 0.3 10E3/UL
IMM GRANULOCYTES NFR BLD: 3 %
LYMPHOCYTES # BLD AUTO: 2.1 10E3/UL (ref 0.8–5.3)
LYMPHOCYTES NFR BLD AUTO: 21 %
MCH RBC QN AUTO: 29.2 PG (ref 26.5–33)
MCHC RBC AUTO-ENTMCNC: 32.4 G/DL (ref 31.5–36.5)
MCV RBC AUTO: 90 FL (ref 78–100)
MONOCYTES # BLD AUTO: 0.7 10E3/UL (ref 0–1.3)
MONOCYTES NFR BLD AUTO: 8 %
NEUTROPHILS # BLD AUTO: 6.5 10E3/UL (ref 1.6–8.3)
NEUTROPHILS NFR BLD AUTO: 67 %
PHOSPHATE SERPL-MCNC: 3.8 MG/DL (ref 2.5–4.5)
PLATELET # BLD AUTO: 389 10E3/UL (ref 150–450)
POTASSIUM SERPL-SCNC: 4.4 MMOL/L (ref 3.4–5.3)
PROT SERPL-MCNC: 7 G/DL (ref 6.4–8.3)
PTH-INTACT SERPL-MCNC: 59 PG/ML (ref 15–65)
RBC # BLD AUTO: 4.04 10E6/UL (ref 3.8–5.2)
SODIUM SERPL-SCNC: 140 MMOL/L (ref 135–145)
T4 FREE SERPL-MCNC: 1.92 NG/DL (ref 0.9–1.7)
TSH SERPL DL<=0.005 MIU/L-ACNC: 3.31 UIU/ML (ref 0.3–4.2)
WBC # BLD AUTO: 9.7 10E3/UL (ref 4–11)

## 2024-11-14 PROCEDURE — 85025 COMPLETE CBC W/AUTO DIFF WBC: CPT

## 2024-11-14 PROCEDURE — 84443 ASSAY THYROID STIM HORMONE: CPT

## 2024-11-14 PROCEDURE — 99000 SPECIMEN HANDLING OFFICE-LAB: CPT

## 2024-11-14 PROCEDURE — 80053 COMPREHEN METABOLIC PANEL: CPT

## 2024-11-14 PROCEDURE — 84100 ASSAY OF PHOSPHORUS: CPT

## 2024-11-14 PROCEDURE — 36415 COLL VENOUS BLD VENIPUNCTURE: CPT

## 2024-11-14 PROCEDURE — 86800 THYROGLOBULIN ANTIBODY: CPT | Mod: 90

## 2024-11-14 PROCEDURE — 84439 ASSAY OF FREE THYROXINE: CPT

## 2024-11-14 PROCEDURE — 82330 ASSAY OF CALCIUM: CPT

## 2024-11-14 PROCEDURE — 84432 ASSAY OF THYROGLOBULIN: CPT | Mod: 90

## 2024-11-14 PROCEDURE — 83970 ASSAY OF PARATHORMONE: CPT

## 2024-11-15 ENCOUNTER — TELEPHONE (OUTPATIENT)
Dept: CARDIOLOGY | Facility: CLINIC | Age: 74
End: 2024-11-15
Payer: MEDICARE

## 2024-11-15 DIAGNOSIS — I48.0 PAF (PAROXYSMAL ATRIAL FIBRILLATION) (H): ICD-10-CM

## 2024-11-15 DIAGNOSIS — I10 BENIGN ESSENTIAL HYPERTENSION: Primary | ICD-10-CM

## 2024-11-15 NOTE — TELEPHONE ENCOUNTER
Spoke with patient. She states she was changed from hydralazine to losartan 25mg daily on October 9. However, she does not feel this is managing her BP at all well.     She takes her metoprolol in the morning and the losartan at night.  Her evening BP range is 155/84 to 200/100.    She also notes that she ended up in the ED on 10/29 for K+=6.1 (drawn at home by RN). This had come down to 4.2 in the ED so no med changes were made. Patient is unhappy with this episode as she does not want to give up her daily avocado!   She is aware that losartan can raise K+.    Patient would like a medication adjustment.    Will message Dr. Zuluaga to review

## 2024-11-15 NOTE — TELEPHONE ENCOUNTER
[facility] Call Center    Phone Message    May a detailed message be left on voicemail: yes     Reason for Call: Other: Patient will like to speak with care team about recent change in blood pressure medication. Please call patient back to further discuss.     Action Taken: Other: Cardiology    Travel Screening: Not Applicable     Thank you!  [facility]

## 2024-11-18 NOTE — TELEPHONE ENCOUNTER
Kirill Zuluaga MD Anderson, Alyson BOO, RN3 days ago     Add hydrochlorothiazide 25 mg daily.  Recheck a BMP in 1 week.  As it is after hours I tried to call her to initiate this change but I was not able to get through.       Left a message for patient to call back and discuss.     Addendum 1525: Spoke to patient, she is unhappy with these recommendations. She does not want to take the losartan at all or the hydrochlorothiazide. She wants to go back to the hydralazine but at a higher dose. She said while in [place] she was taking hydralazine 50mg in the AM and then had PRN instructions depending on where her BP is. She has taken as much at 50mg TID without issues. She notes she has many drug intolerances and allergies and is wary of trying new medications. She notes she tried something just the other day that another doctor prescribed and had mouth numbness. She prefers to stick with what she knows will work for her.

## 2024-11-19 RX ORDER — HYDRALAZINE HYDROCHLORIDE 25 MG/1
25 TABLET, FILM COATED ORAL 3 TIMES DAILY
Status: SHIPPED
Start: 2024-11-19

## 2024-11-19 NOTE — TELEPHONE ENCOUNTER
Kirill Zuluaga MD Hiljus, Audrey G, RN17 hours ago (5:10 PM)     If she insists on hydralazine it is a 3 time a day medicine it only lasts about 8 hours.  We do not want on off on off.  Start out with 25 mg every 8 hours and call us back in 2 weeks     ==================================================    Patient called to inform of the above recommendation from Dr. Zuluaga. No answer, VM left asking patient to please return call to team 2 nurse line to discuss.    UPDATE 1328 - Patient returned call to clinic and informed of above recommendation from Dr. Hathaway. Patient educated on indication, use and dosing of medication. Instructed on importance of monitoring BP regularly and updating clinic with readings in 2 weeks. Staff message also sent to follow up. Patient agreeable to plan and denies need for new prescription to be sent as she has correct dose of medication at home.

## 2024-11-20 LAB
DEPRECATED CALCIDIOL+CALCIFEROL SERPL-MC: <47 UG/L (ref 20–75)
VITAMIN D2 SERPL-MCNC: <5 UG/L
VITAMIN D3 SERPL-MCNC: 42 UG/L

## 2024-11-25 LAB — SCANNED LAB RESULT: NORMAL

## 2024-11-26 ENCOUNTER — PRE VISIT (OUTPATIENT)
Dept: NEUROSURGERY | Facility: CLINIC | Age: 74
End: 2024-11-26

## 2024-11-27 DIAGNOSIS — Z94.4 LIVER REPLACED BY TRANSPLANT (H): ICD-10-CM

## 2024-11-27 DIAGNOSIS — N39.0 URINARY TRACT INFECTION: Primary | ICD-10-CM

## 2024-11-27 RX ORDER — URSODIOL 250 MG/1
250 TABLET, FILM COATED ORAL 2 TIMES DAILY
Qty: 180 TABLET | Refills: 3 | Status: SHIPPED | OUTPATIENT
Start: 2024-11-27

## 2024-11-27 RX ORDER — FOLIC ACID 1 MG/1
1 TABLET ORAL DAILY
Qty: 100 TABLET | Refills: 0 | Status: SHIPPED | OUTPATIENT
Start: 2024-11-27

## 2024-11-27 RX ORDER — PREDNISONE 10 MG/1
10 TABLET ORAL DAILY
Qty: 90 TABLET | Refills: 3 | Status: SHIPPED | OUTPATIENT
Start: 2024-11-27

## 2024-11-27 RX ORDER — GRANULES FOR ORAL 3 G/1
POWDER ORAL
Qty: 3 PACKET | Refills: 0 | Status: SHIPPED | OUTPATIENT
Start: 2024-11-27

## 2024-11-27 RX ORDER — SIROLIMUS 1 MG/1
1 TABLET, FILM COATED ORAL DAILY
Qty: 90 TABLET | Refills: 3 | Status: SHIPPED | OUTPATIENT
Start: 2024-11-27

## 2024-11-27 NOTE — TELEPHONE ENCOUNTER
RECORDS RECEIVED FROM: Care Everywhere   REASON FOR VISIT: Cerebral aneurysm   PROVIDER: Tereza Richards APRN CNP   DATE OF APPT: 12/18/24 @ 9:00 am    NOTES (FOR ALL VISITS) STATUS DETAILS   OFFICE NOTE from referring provider Internal 6/21/24 (Orders Only) Angelika Frausto, RN @Transplant Clinic      DISCHARGE SUMMARY from hospital Care Everywhere 11/24/23-11/28/23 June Proctor MD  @[facility]      DISCHARGE REPORT from the ER Care Everywhere 1/15/23-1/16/23 Kobi Spear MD  @[facility]      MEDICATION LIST Internal    IMAGING  (FOR ALL VISITS)     MRI (HEAD, NECK, SPINE) Received [facility]  5/3/23 MR Brain  1/15/23 MR Brain     CT (HEAD, NECK, SPINE) Received [facility]  11/7/23 CT Head  1/15/23 CT Head  1/15/23 CTA Head Neck

## 2024-12-01 DIAGNOSIS — Z94.4 LIVER REPLACED BY TRANSPLANT (H): ICD-10-CM

## 2024-12-01 DIAGNOSIS — N30.20 BLADDER INFECTION, CHRONIC: Primary | ICD-10-CM

## 2024-12-02 ENCOUNTER — TELEPHONE (OUTPATIENT)
Dept: CARDIOLOGY | Facility: CLINIC | Age: 74
End: 2024-12-02

## 2024-12-02 ENCOUNTER — OFFICE VISIT (OUTPATIENT)
Dept: INFECTIOUS DISEASES | Facility: CLINIC | Age: 74
End: 2024-12-02
Attending: STUDENT IN AN ORGANIZED HEALTH CARE EDUCATION/TRAINING PROGRAM
Payer: MEDICARE

## 2024-12-02 VITALS
DIASTOLIC BLOOD PRESSURE: 79 MMHG | SYSTOLIC BLOOD PRESSURE: 148 MMHG | BODY MASS INDEX: 27.94 KG/M2 | WEIGHT: 178 LBS | OXYGEN SATURATION: 96 % | TEMPERATURE: 98.4 F | HEART RATE: 80 BPM | HEIGHT: 67 IN

## 2024-12-02 DIAGNOSIS — N39.0 RECURRENT UTI: Primary | ICD-10-CM

## 2024-12-02 DIAGNOSIS — Z94.4 LIVER REPLACED BY TRANSPLANT (H): ICD-10-CM

## 2024-12-02 DIAGNOSIS — R32 INCONTINENCE IN FEMALE: ICD-10-CM

## 2024-12-02 PROCEDURE — 99417 PROLNG OP E/M EACH 15 MIN: CPT | Performed by: STUDENT IN AN ORGANIZED HEALTH CARE EDUCATION/TRAINING PROGRAM

## 2024-12-02 PROCEDURE — 99205 OFFICE O/P NEW HI 60 MIN: CPT | Performed by: STUDENT IN AN ORGANIZED HEALTH CARE EDUCATION/TRAINING PROGRAM

## 2024-12-02 PROCEDURE — G0463 HOSPITAL OUTPT CLINIC VISIT: HCPCS | Performed by: STUDENT IN AN ORGANIZED HEALTH CARE EDUCATION/TRAINING PROGRAM

## 2024-12-02 RX ORDER — GRANULES FOR ORAL 3 G/1
3 POWDER ORAL EVERY OTHER DAY
Qty: 5 PACKET | Refills: 0 | Status: SHIPPED | OUTPATIENT
Start: 2024-12-02 | End: 2024-12-11

## 2024-12-02 RX ORDER — GRANULES FOR ORAL 3 G/1
3 POWDER ORAL ONCE
Qty: 5 PACKET | Refills: 3 | Status: SHIPPED | OUTPATIENT
Start: 2024-12-02 | End: 2024-12-02

## 2024-12-02 ASSESSMENT — PAIN SCALES - GENERAL: PAINLEVEL_OUTOF10: NO PAIN (0)

## 2024-12-02 NOTE — TELEPHONE ENCOUNTER
"Alert received from patient's loop recorder for , \"Average V. Rate during AT/AF > Threshold\"  Since last transmission received on 10/7/24, 8 tachy and 36 AF episodes have been logged, longest F episode lasted 4h44m. Upon review of graphic trends, AF burden appears to be increasing over the last month or so.      All ECGs for review show AF w/ RVR with variable V rates mostly in the 's. Except tachy episode # 616 which shows likely AF w/ RVR with 10 beats of NSVT (refer to screenshot included below). 2.1% AF burden since     Patient has a known history of pAF, takes eliquis and toprol XL. Per Dr. Zuluaga on 8/23/24:     \"[place] states she treats her paroxysmal atrial fibrillation which she states occurs quite rarely by taking metoprolol succinate 25 mg daily and uses it as a pill in the pocket if she should have a breakthrough. She has had no chest arm neck jaw or shoulder discomfort. She has no orthopnea or PND. She states usually when she has an episode of A-fib she just sits down and relaxes and it converts fairly quickly as stated she will use as needed metoprolol. Episodes are usually triggered by stress. She has no bleeding problems. She has had no further incidence of TIA or CVA.     ...She is quite comfortable with continuing to control her A-fib with metoprolol plus metoprolol as pill in the pocket. We talked about consideration of referral to EP for A-fib ablation and she is not interested.\"    Called patient but she was currently at an appt with Infectious Disease so was unable to talk but will call back later. Upon review of chart, patient is currently being treated for a recurrent UTI.    GG RN        "

## 2024-12-02 NOTE — NURSING NOTE
"Chief Complaint   Patient presents with    New Patient     New Patient     BP (!) 148/79   Pulse 80   Temp 98.4  F (36.9  C) (Oral)   Ht 1.702 m (5' 7\")   Wt 80.7 kg (178 lb)   LMP 06/01/1988 (Approximate)   SpO2 96%   BMI 27.88 kg/m    Chula Orta on 12/2/2024 at 2:18 PM    "

## 2024-12-02 NOTE — PROGRESS NOTES
[facility]   [address] [address]  Phone: [phone]  Fax: [phone]        Transplant Infectious Disease Consultation note  Today's Date: 12/02/2024    Recommendations:  - standing order for urine analysis and culture has been placed. Pls collect a midstream urine sample (educated on how to collect) before starting abx when UTI symptoms develop   - standing order for fosfomycin has been placed to start if UTI symptoms develop  - will stop or continue or alter abx based on urine cx result   - drink 2.5-3L of water a day   - stop lasix and see if pedal edema re-occurs. She looks very dry today   - always wipe from front to back after urinating   - install bidet or use bath mug or squirt bottle to wash after a bowel movt   - use vaginal estrogen cream 2-3 times a week regularly   - schedule a urology appt with Dr. Gutierrez    RTBAILEY 2.5 months. Next visit to explore multiple abx allergies     Thank you for involving me in the care of this patient. Please do not hesitate to contact me with any questions.     Assessment:  Luz Thompson is a 74 year old with PMH of primary biliary cirrhosis s/p Liver transplant in 2002 on sirolimus, prednisone 10 mg daily and ursodiol. History also significant for paroxysmal A-fib on Eliquis with loop recorder in place, DVT, DM 2, lymphedema, HTN, HLD, CVA, hypothyroidism, Sjogren syndrome, anxiety, glaucoma, anemia, chronic back pain, CKD stage IIIb, osteoporosis, knee issues affecting mobility and Chaffee fever.     Recurrent UTIs for the past 5 years: she has occasional stress and urge incontinence. Dysuria does not always resolve with abxs. Recent hospitalization in Sep with urosepsis/bacteremia with ESBL.    I am not sure if all her episodes of UTIs are true UTIs since she has dysuria that does not resolve with abxs and because she has occasional incontinence issues as well.     Will evaluate jointly  the issue with urology. Will monitor her symptoms and how many are true UTIs vs not.    Face to face time 55 mins. Total time including chart review, care-coordination and documentation time on the date of encounter - 90 mins     Dr. Natividad Lundberg  Division of Infectious Disease  [facility]    -------------------------------------------------------------------------------------------------------------------   Reason for consult / Chief complaint:   Consulted by Dr. Ramirez for recurrent UTIs    History of presenting illness:  Luz Thompson is a 74 year old with PMH of primary biliary cirrhosis s/p Liver transplant in 2002 on sirolimus, prednisone 10 mg daily and ursodiol. History also significant for paroxysmal A-fib on Eliquis with loop recorder in place, DVT, DM 2, lymphedema, HTN, HLD, CVA, hypothyroidism, Sjogren syndrome, anxiety, glaucoma, anemia, chronic back pain, CKD stage IIIb, osteoporosis, knee issues affecting mobility.     She reports recurrent UTI for many years, does not remember since when. But it is a lot more frequent in the past 5 years. They come in bouts and then regress. She reports 3 in the past 6 weeks   She is now on Fosfomycin - 3 paks - last dose 11/31. She had low grade fevers to 99.2-99.6 with this last bout of UTI, no fevers since yesterday.   She was recently Hospitalized twice in [facility] for urosepsis in Sep and possible UTI in Oct 2024. In Sep blood cx and urine cx pos for same organism and rcvd meropenem for 10 days). Readmitted in Oct for dysuria and rcvd meropenem for 14 days     She reports that Abxs do not completely resolve symptoms     Symptoms of UTI: end of urinary stream is painful, has bladder spasms - suprapubic pain, low grade fever     She has many abx allergies, and with increasing bacterial resistance, she can take only macrobid or fosfomycin. For macrobid she is allergic to the  capsules, therefore it has to be a pill or liquid formulation  or compounding pharmacy    She has Occasional stress incontinence when her bladder is already full. She has Urge incontinence when she has an infection      Due to shoulder issue, she is not able to clean herself well after a bowel movt    She reports that vagina feels dry   She is Supposed to be using vainal estrogen cream every other day but forgets   She drinks 5, 16 ounce bottles of water a day - 2.5 L a day     Last two urine samples were collected via straight cath as difficult to collect it herself when not at home as the toilet is too high and not close to the sink. 11/6 urine cx no growth. On fosfomycin.     She has a urologist PA in [place]. A specialty urology referral at the  was placed on 12/1.   Ct abdomen is scheduled for 12/17   She reports having Cystoscopy in the past year in [place] and that looked ok   She does not think UDS has been done before     She Moved from [facility] to the [facility] in July. 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    Prophylactic antibiotic    High risk medication use    Statin intolerance    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    Sensorineural hearing loss (SNHL) of both ears    Abnormal liver function tests    Liver replaced by transplant (H)    Hyperlipidemia LDL goal <70    Hypertension goal BP (blood pressure) < 140/80    Postoperative hypothyroidism    Type 2 diabetes mellitus with stage 3b chronic kidney disease, without long-term current use of insulin (H)    Age-related osteoporosis without current pathological fracture    Tympanic membrane perforation, left    Other infective chronic otitis externa of left ear    Stage 3b chronic kidney disease (H)    Escherichia coli sepsis (H)    Physical deconditioning    Immunosuppression  (H)    Papillary thyroid carcinoma (H)    PAF (paroxysmal atrial fibrillation) (H)    Status post liver transplantation (H)    Elevated lactic acid level    Fever in adult    Urinary tract infection    Chronic kidney disease, unspecified CKD stage    Lung infection    Acute cystitis without hematuria    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       Morphine And Codeine Itching and Other (See Comments)     Psych disturbance  Other reaction(s): Confusion, Mood alteration    Quinolones Anxiety, Dizziness, Headache, Other (See Comments), Palpitations and Unknown     Other reaction(s): Hyperactive behavior, Lightheadedness, Mood alteration    Dizzy, light headed    Dizziness, shaky, and jumpy    Benadryl [Diphenhydramine Hcl]      Insomnia     Capsules, Empty Gelatin [Gelatin]     Cephalosporins Itching    Ciprofloxacin Hcl Dizziness and Other (See Comments)    Lansoprazole Diarrhea    Azithromycin Itching    Doxycycline Itching and Unknown    Lisinopril Cough    Omeprazole Itching    Tolectin [Nsaids] Rash    Tolmetin Rash and Itching    Tramadol Rash, Hives and Itching         Medications:  Current Outpatient Medications   Medication Sig Dispense Refill    apixaban ANTICOAGULANT (ELIQUIS ANTICOAGULANT) 2.5 MG tablet Take 1 tablet (2.5 mg) by mouth 2 times daily 180 tablet 3    azelastine (ASTELIN) 0.1 % nasal spray Spray 1 spray into both nostrils as needed for allergies or rhinitis.      calcitRIOL (ROCALTROL) 0.25 MCG capsule Take 1 capsule (0.25 mcg) by mouth daily. 90  capsule 3    D-MANNOSE PO Take 1 Scoop by mouth 2 times daily.      estradiol (ESTRACE) 0.1 MG/GM vaginal cream Place vaginally every other day.      evolocumab (REPATHA SURECLICK) 140 MG/ML prefilled autoinjector Inject 1 mL (140 mg) subcutaneously every 14 days. . Please have fasting labs drawn for further refills. 6 mL 3    ezetimibe (ZETIA) 10 MG tablet Take 1 tablet (10 mg) by mouth daily. 90 tablet 3    Ferrous Sulfate 324 MG TBEC Take 1 tablet by mouth 2 times daily.      folic acid (FOLVITE) 1 MG tablet Take 1 tablet (1 mg) by mouth daily. 100 tablet 0    fosfomycin (MONUROL) 3 g Packet Every other day for 3 doses, dispense 3 packs 3 packet 0    fosfomycin (MONUROL) 3 g Packet Take 1 packet (3 g) by mouth every other day. 3 packet 0    furosemide (LASIX) 20 MG tablet Take 1 tablet (20 mg) by mouth daily 90 tablet 3    gabapentin (NEURONTIN) 250 MG/5ML solution Take 6 mLs (300 mg) by mouth at bedtime 180 mL 0    glucose (BD GLUCOSE) 5 g chewable tablet Take 2 tablets (10 g) by mouth as needed (low blood sugar) 40 tablet 1    hydrALAZINE (APRESOLINE) 25 MG tablet Take 1 tablet (25 mg) by mouth 3 times daily.      hypromellose (ARTIFICIAL TEARS) 0.4 % SOLN ophthalmic solution Apply 1 drop to eye every hour as needed for dry eyes      levothyroxine (SYNTHROID/LEVOTHROID) 175 MCG tablet Take 1 tablet (175 mcg) by mouth daily. 90 tablet 3    linagliptin (TRADJENTA) 5 MG TABS tablet Take 1 tablet (5 mg) by mouth daily 90 tablet 1    losartan (COZAAR) 25 MG tablet Take 1 tablet (25 mg) by mouth daily. 30 tablet 3    meclizine (ANTIVERT) 25 MG tablet Take 1 tablet (25 mg) by mouth as needed for dizziness or other (migraines) 30 tablet 11    metoprolol succinate ER (TOPROL XL) 25 MG 24 hr tablet Take 1 tablet (25 mg) by mouth daily. Take an extra tablet daily as needed for breakthrough afib. May repeat in two hours if heart rate remains >100bpm (no more than 3 tablets per day). 135 tablet 3    Multiple  "Vitamins-Minerals (PRESERVISION AREDS 2) CAPS Take 1 capsule by mouth 2 times daily      Nutrisource Fiber PO packet Take 1 packet by mouth daily.      omega-3 acid ethyl esters (LOVAZA) 1 g capsule Take 1 capsule by mouth 2 times daily 180 capsule 1    predniSONE (DELTASONE) 10 MG tablet Take 1 tablet (10 mg) by mouth daily. 90 tablet 3    sirolimus (GENERIC EQUIVALENT) 1 MG tablet Take 1 tablet (1 mg) by mouth daily. 90 tablet 3    ursodiol (ACTIGALL) 250 MG tablet Take 1 tablet (250 mg) by mouth 2 times daily. 180 tablet 3     No current facility-administered medications for this visit.         Immunizations:  Immunization History   Administered Date(s) Administered    COVID-19 12+ (Pfizer) 10/17/2024    COVID-19 MONOVALENT 12+ (Pfizer) 02/21/2021, 03/15/2021, 10/09/2021    [id number] Novel Flu P-free 11/10/2009    Influenza (High Dose) Trivalent,PF (Fluzone) 09/25/2015, 10/30/2016, 10/23/2017, 10/24/2024    Influenza (IIV3) PF 11/10/2004, 09/29/2007, 10/01/2010, 09/27/2012, 10/29/2012, 09/30/2013, 09/01/2016    Influenza Vaccine 18-64 (Flublok) 10/20/2018    Pneumo Conj 13-V (2010&after) 02/26/2014    Pneumococcal 23 valent 12/01/2007, 05/25/2016    TD,PF 7+ (Tenivac) 01/01/2007    TDAP Vaccine (Adacel) 09/17/2007, 02/26/2014         Examination:  Vital signs:   BP (!) 148/79   Pulse 80   Temp 98.4  F (36.9  C) (Oral)   Ht 1.702 m (5' 7\")   Wt 80.7 kg (178 lb)   LMP 06/01/1988 (Approximate)   SpO2 96%   BMI 27.88 kg/m      Constitutional: Patient in no distress, in a wheel chair today due to knee issues   Eyes: not pale, not jaundiced  Skin: no rash, very dry skin   Extremities: no pedal edema  Psych: Alert and oriented x 3    Laboratory:  Hematology:  Recent Labs   Lab Test 11/14/24  1506 11/01/24  1110 10/29/24  1530   WBC 9.7 9.8 10.3   RBC 4.04 3.63* 3.75*   HGB 11.8 10.7* 10.9*   HCT 36.4 33.5* 34.8*   MCV 90 92 93   MCH 29.2 29.5 29.1   MCHC 32.4 31.9 31.3*   RDW 15.7* 15.3* 15.3*    373 443 "       Chemistry:  Recent Labs   Lab Test 11/14/24  1506 11/01/24  1110 10/29/24  1911    138 143   POTASSIUM 4.4 5.4* 4.2   CHLORIDE 107 103 106   CO2 21* 22 26   ANIONGAP 12 13 11   * 195* 100*   BUN 42.1* 40.8* 34.3*   CR 1.56* 1.66* 1.45*   KEILY 9.6 8.0* 9.3       Liver Function Studies:  Recent Labs   Lab Test 11/14/24  1506   PROTTOTAL 7.0   ALBUMIN 3.9   BILITOTAL 0.4   ALKPHOS 126   AST 22   ALT 27       Immune Globulin Studies:  Recent Labs   Lab Test 08/05/19  1552   IGG 1,110             Microbiology:  9/22 blood cx ESBL ,, and Pseudomonas aeruginosa     Urine cxs  11/6/24 - no growth   10/11/24 50-100k mixed urogenital louann (before abx was administered but she might have taken oral abxs prior)  9/22 >100k ESBL Klebsiella oxytoca  8/20 >100k ESBL,,    Imaging:  None recent or relevant

## 2024-12-02 NOTE — LETTER
12/2/2024       RE: Luz Thompson  [address]  [facility] [address]     Dear Colleague,    Thank you for referring your patient, Luz Thompson, to the [facility] [place] at [facility]. Please see a copy of my visit note below.          [facility]   [address] [facility] [address]  Phone: [phone]  Fax: [phone]        Transplant Infectious Disease Consultation note  Today's Date: 12/02/2024    Recommendations:  - standing order for urine analysis and culture has been placed. Pls collect a midstream urine sample (educated on how to collect) before starting abx when UTI symptoms develop   - standing order for fosfomycin has been placed to start if UTI symptoms develop  - will stop or continue or alter abx based on urine cx result   - drink 2.5-3L of water a day   - stop lasix and see if pedal edema re-occurs. She looks very dry today   - always wipe from front to back after urinating   - install bidet or use bath mug or squirt bottle to wash after a bowel movt   - use vaginal estrogen cream 2-3 times a week regularly   - schedule a urology appt with Dr. Gutierrez    RTC 2.5 months. Next visit to explore multiple abx allergies     Thank you for involving me in the care of this patient. Please do not hesitate to contact me with any questions.     Assessment:  Luz Thompson is a 74 year old with PMH of primary biliary cirrhosis s/p Liver transplant in 2002 on sirolimus, prednisone 10 mg daily and ursodiol. History also significant for paroxysmal A-fib on Eliquis with loop recorder in place, DVT, DM 2, lymphedema, HTN, HLD, CVA, hypothyroidism, Sjogren syndrome, anxiety, glaucoma, anemia, chronic back pain, CKD stage IIIb, osteoporosis, knee issues affecting mobility and [place] fever.     Recurrent UTIs for the past 5 years: she has occasional  stress and urge incontinence. Dysuria does not always resolve with abxs. Recent hospitalization in Sep with urosepsis/bacteremia with ESBL.    I am not sure if all her episodes of UTIs are true UTIs since she has dysuria that does not resolve with abxs and because she has occasional incontinence issues as well.     Will evaluate jointly the issue with urology. Will monitor her symptoms and how many are true UTIs vs not.    Face to face time 55 mins. Total time including chart review, care-coordination and documentation time on the date of encounter - 90 mins     Dr. Natividad Lundberg  Division of Infectious Disease  [facility]    -------------------------------------------------------------------------------------------------------------------   Reason for consult / Chief complaint:   Consulted by Dr. Ramirez for recurrent UTIs    History of presenting illness:  Luz Thompson is a 74 year old with PMH of primary biliary cirrhosis s/p Liver transplant in 2002 on sirolimus, prednisone 10 mg daily and ursodiol. History also significant for paroxysmal A-fib on Eliquis with loop recorder in place, DVT, DM 2, lymphedema, HTN, HLD, CVA, hypothyroidism, Sjogren syndrome, anxiety, glaucoma, anemia, chronic back pain, CKD stage IIIb, osteoporosis, knee issues affecting mobility.     She reports recurrent UTI for many years, does not remember since when. But it is a lot more frequent in the past 5 years. They come in bouts and then regress. She reports 3 in the past 6 weeks   She is now on Fosfomycin - 3 paks - last dose 11/31. She had low grade fevers to 99.2-99.6 with this last bout of UTI, no fevers since yesterday.   She was recently Hospitalized twice in [facility] for urosepsis in Sep and possible UTI in Oct 2024. In Sep blood cx and urine cx pos for same organism and rcvd meropenem for 10 days). Readmitted in Oct for dysuria and rcvd meropenem for 14 days     She reports that Abxs do not completely resolve  symptoms     Symptoms of UTI: end of urinary stream is painful, has bladder spasms - suprapubic pain, low grade fever     She has many abx allergies, and with increasing bacterial resistance, she can take only macrobid or fosfomycin. For macrobid she is allergic to the  capsules, therefore it has to be a pill or liquid formulation or compounding pharmacy    She has Occasional stress incontinence when her bladder is already full. She has Urge incontinence when she has an infection      Due to shoulder issue, she is not able to clean herself well after a bowel movt    She reports that vagina feels dry   She is Supposed to be using vainal estrogen cream every other day but forgets   She drinks 5, 16 ounce bottles of water a day - 2.5 L a day     Last two urine samples were collected via straight cath as difficult to collect it herself when not at home as the toilet is too high and not close to the sink. 11/6 urine cx no growth. On fosfomycin.     She has a urologist PA in [place]. A specialty urology referral at the  was placed on 12/1.   Ct abdomen is scheduled for 12/17   She reports having Cystoscopy in the past year in [place] and that looked ok   She does not think UDS has been done before     She Moved from [facility] to the [facility] in July. 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     Prophylactic antibiotic     High risk medication use     Statin intolerance     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     Sensorineural hearing loss (SNHL) of both ears     Abnormal liver function tests     Liver replaced by transplant (H)     Hyperlipidemia LDL goal <70     Hypertension goal BP (blood pressure) < 140/80     Postoperative hypothyroidism     Type 2 diabetes mellitus  with stage 3b chronic kidney disease, without long-term current use of insulin (H)     Age-related osteoporosis without current pathological fracture     Tympanic membrane perforation, left     Other infective chronic otitis externa of left ear     Stage 3b chronic kidney disease (H)     Escherichia coli sepsis (H)     Physical deconditioning     Immunosuppression (H)     Papillary thyroid carcinoma (H)     PAF (paroxysmal atrial fibrillation) (H)     Status post liver transplantation (H)     Elevated lactic acid level     Fever in adult     Urinary tract infection     Chronic kidney disease, unspecified CKD stage     Lung infection     Acute cystitis without hematuria     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        Morphine And Codeine Itching and Other (See Comments)     Psych disturbance  Other reaction(s): Confusion, Mood alteration     Quinolones Anxiety, Dizziness, Headache, Other (See Comments), Palpitations and Unknown     Other reaction(s): Hyperactive behavior, Lightheadedness, Mood alteration    Dizzy, light headed    Dizziness, shaky, and jumpy     Benadryl [Diphenhydramine Hcl]      Insomnia      Capsules, Empty Gelatin [Gelatin]      Cephalosporins Itching     Ciprofloxacin Hcl Dizziness and Other (See Comments)     Lansoprazole Diarrhea     Azithromycin Itching     Doxycycline Itching and Unknown     Lisinopril Cough     Omeprazole Itching     Tolectin [Nsaids] Rash     Tolmetin Rash and Itching     Tramadol Rash, Hives and Itching          Medications:  Current Outpatient Medications   Medication Sig Dispense Refill     apixaban ANTICOAGULANT (ELIQUIS ANTICOAGULANT) 2.5 MG tablet Take 1 tablet (2.5 mg) by mouth 2 times daily 180 tablet 3     azelastine (ASTELIN) 0.1 % nasal spray Spray 1 spray into both nostrils as needed for allergies or rhinitis.       calcitRIOL (ROCALTROL) 0.25 MCG capsule Take 1 capsule (0.25 mcg) by mouth daily. 90 capsule 3     D-MANNOSE PO Take 1 Scoop by mouth 2 times daily.       estradiol (ESTRACE) 0.1 MG/GM vaginal cream Place vaginally every other day.       evolocumab (REPATHA SURECLICK) 140 MG/ML prefilled autoinjector Inject 1 mL (140 mg) subcutaneously every 14 days. . Please have fasting labs drawn for further refills. 6 mL 3     ezetimibe (ZETIA) 10 MG tablet Take 1 tablet (10 mg) by mouth daily. 90 tablet 3     Ferrous Sulfate 324 MG TBEC Take 1 tablet by mouth 2 times daily.       folic acid (FOLVITE) 1 MG tablet Take 1 tablet (1 mg) by mouth daily. 100 tablet 0     fosfomycin (MONUROL) 3 g Packet Every other day for 3 doses, dispense 3 packs 3 packet 0     fosfomycin (MONUROL) 3 g Packet Take 1 packet (3 g) by mouth every other day. 3 packet 0     furosemide (LASIX) 20 MG tablet Take 1 tablet (20 mg) by mouth daily 90 tablet 3     gabapentin (NEURONTIN) 250 MG/5ML solution Take 6 mLs (300 mg) by mouth at bedtime 180 mL 0     glucose (BD GLUCOSE) 5 g chewable tablet Take 2 tablets (10 g) by mouth as needed (low blood sugar) 40 tablet 1     hydrALAZINE (APRESOLINE) 25 MG tablet Take 1 tablet (25 mg) by mouth 3 times daily.       hypromellose (ARTIFICIAL TEARS) 0.4 % SOLN ophthalmic solution Apply 1 drop to eye every hour as needed for dry eyes       levothyroxine (SYNTHROID/LEVOTHROID) 175 MCG tablet Take 1 tablet (175 mcg) by mouth daily. 90 tablet 3     linagliptin (TRADJENTA) 5 MG TABS tablet Take 1 tablet (5 mg) by mouth daily 90 tablet 1     losartan (COZAAR) 25 MG tablet Take 1 tablet (25 mg) by mouth  "daily. 30 tablet 3     meclizine (ANTIVERT) 25 MG tablet Take 1 tablet (25 mg) by mouth as needed for dizziness or other (migraines) 30 tablet 11     metoprolol succinate ER (TOPROL XL) 25 MG 24 hr tablet Take 1 tablet (25 mg) by mouth daily. Take an extra tablet daily as needed for breakthrough afib. May repeat in two hours if heart rate remains >100bpm (no more than 3 tablets per day). 135 tablet 3     Multiple Vitamins-Minerals (PRESERVISION AREDS 2) CAPS Take 1 capsule by mouth 2 times daily       Nutrisource Fiber PO packet Take 1 packet by mouth daily.       omega-3 acid ethyl esters (LOVAZA) 1 g capsule Take 1 capsule by mouth 2 times daily 180 capsule 1     predniSONE (DELTASONE) 10 MG tablet Take 1 tablet (10 mg) by mouth daily. 90 tablet 3     sirolimus (GENERIC EQUIVALENT) 1 MG tablet Take 1 tablet (1 mg) by mouth daily. 90 tablet 3     ursodiol (ACTIGALL) 250 MG tablet Take 1 tablet (250 mg) by mouth 2 times daily. 180 tablet 3     No current facility-administered medications for this visit.         Immunizations:  Immunization History   Administered Date(s) Administered     COVID-19 12+ (Pfizer) 10/17/2024     COVID-19 MONOVALENT 12+ (Pfizer) 02/21/2021, 03/15/2021, 10/09/2021     [id number] Novel Flu P-free 11/10/2009     Influenza (High Dose) Trivalent,PF (Fluzone) 09/25/2015, 10/30/2016, 10/23/2017, 10/24/2024     Influenza (IIV3) PF 11/10/2004, 09/29/2007, 10/01/2010, 09/27/2012, 10/29/2012, 09/30/2013, 09/01/2016     Influenza Vaccine 18-64 (Flublok) 10/20/2018     Pneumo Conj 13-V (2010&after) 02/26/2014     Pneumococcal 23 valent 12/01/2007, 05/25/2016     TD,PF 7+ (Tenivac) 01/01/2007     TDAP Vaccine (Adacel) 09/17/2007, 02/26/2014         Examination:  Vital signs:   BP (!) 148/79   Pulse 80   Temp 98.4  F (36.9  C) (Oral)   Ht 1.702 m (5' 7\")   Wt 80.7 kg (178 lb)   LMP 06/01/1988 (Approximate)   SpO2 96%   BMI 27.88 kg/m      Constitutional: Patient in no distress, in a wheel chair today " due to knee issues   Eyes: not pale, not jaundiced  Skin: no rash, very dry skin   Extremities: no pedal edema  Psych: Alert and oriented x 3    Laboratory:  Hematology:  Recent Labs   Lab Test 11/14/24  1506 11/01/24  1110 10/29/24  1530   WBC 9.7 9.8 10.3   RBC 4.04 3.63* 3.75*   HGB 11.8 10.7* 10.9*   HCT 36.4 33.5* 34.8*   MCV 90 92 93   MCH 29.2 29.5 29.1   MCHC 32.4 31.9 31.3*   RDW 15.7* 15.3* 15.3*    373 443       Chemistry:  Recent Labs   Lab Test 11/14/24  1506 11/01/24  1110 10/29/24  1911    138 143   POTASSIUM 4.4 5.4* 4.2   CHLORIDE 107 103 106   CO2 21* 22 26   ANIONGAP 12 13 11   * 195* 100*   BUN 42.1* 40.8* 34.3*   CR 1.56* 1.66* 1.45*   KEILY 9.6 8.0* 9.3       Liver Function Studies:  Recent Labs   Lab Test 11/14/24  1506   PROTTOTAL 7.0   ALBUMIN 3.9   BILITOTAL 0.4   ALKPHOS 126   AST 22   ALT 27       Immune Globulin Studies:  Recent Labs   Lab Test 08/05/19  1552   IGG 1,110             Microbiology:  9/22 blood cx ESBL ,, and Pseudomonas aeruginosa     Urine cxs  11/6/24 - no growth   10/11/24 50-100k mixed urogenital louann (before abx was administered but she might have taken oral abxs prior)  9/22 >100k ESBL Klebsiella oxytoca  8/20 >100k ESBL,,    Imaging:  None recent or relevant                         Again, thank you for allowing me to participate in the care of your patient.      Sincerely,    Natividad Lundberg MD

## 2024-12-02 NOTE — PATIENT INSTRUCTIONS
- standing order for urine analysis and culture has been placed   - standing order for fosfomycin has been placed  - drink 2.5-3L of water a day   - stop lasix and see of swelling re-occurs  - always wipe from front to back   - install bidet or use bath mug or squirt bottle to wash after a bowel movt   - use vaginal estrogen cream 2-3 times a week regularly   - schedule a urology appt with Dr. Gutierrez

## 2024-12-03 ENCOUNTER — TELEPHONE (OUTPATIENT)
Dept: CARDIOLOGY | Facility: CLINIC | Age: 74
End: 2024-12-03

## 2024-12-03 ENCOUNTER — ANCILLARY PROCEDURE (OUTPATIENT)
Dept: CARDIOLOGY | Facility: CLINIC | Age: 74
End: 2024-12-03
Attending: INTERNAL MEDICINE
Payer: MEDICARE

## 2024-12-03 DIAGNOSIS — I10 BENIGN ESSENTIAL HYPERTENSION: Primary | ICD-10-CM

## 2024-12-03 DIAGNOSIS — I48.0 PAROXYSMAL ATRIAL FIBRILLATION (H): ICD-10-CM

## 2024-12-03 DIAGNOSIS — Z45.09 ENCOUNTER FOR LOOP RECORDER CHECK: ICD-10-CM

## 2024-12-03 PROCEDURE — 93298 REM INTERROG DEV EVAL SCRMS: CPT | Performed by: INTERNAL MEDICINE

## 2024-12-03 NOTE — TELEPHONE ENCOUNTER
Patient was restarted on hydralazine at 25 mg q 8 hrs. Patient was to call with BP update.  BP at outside OV yesterday: 148/79    Attempted to contact patient for home BP readings - she was not awake and hung up the phone.    Will try again later      1300 spoke with patient regarding BP control. She states the hydralazine 25mg TID does not work for her lifestyle. She cannot mange to get 3 dose in each day and is getting by on twice daily.  However, her home BP readings at average 140-150 systolic and can be as high as 200 at night.   Patient requests again to go back to what her [place] provider gave her - the hydralazine 50mg BID plan.    NOTE: patient saw the infections disease team yesterday for UTI issue and they directed her to stop her lasix due to elevated BUN and the dehydration creating potentiation risk for her UTIs.  Per Dr. Lundberg's note 12/2/2024: stop lasix and see of swelling re-occurs     NOTE: this month both losartan and HcTZ were recommended but patient declined to take them as she feels the hydralazine works best for her.    Patient states she know her stress issues and knee pain make her BP elevate, but she wants better control so her systolic BP stays at 120.    Will message Dr. Zuluaga to review

## 2024-12-04 RX ORDER — HYDRALAZINE HYDROCHLORIDE 50 MG/1
50 TABLET, FILM COATED ORAL 3 TIMES DAILY
Qty: 270 TABLET | Refills: 3 | Status: SHIPPED | OUTPATIENT
Start: 2024-12-04

## 2024-12-04 NOTE — TELEPHONE ENCOUNTER
Kirill Zuluaga MD Anderson, Barbara E, RN18 hours ago (5:24 PM)     Increase to 50 mg 3 times daily.     ===========================================    Patient called with above recommendation from Dr. Zuluaga. Encouraged to continue to monitor BP and notify clinic with any concerns. Patient agreeable to plan and verbalizes understanding.

## 2024-12-05 LAB
MDC_IDC_EPISODE_DTM: NORMAL
MDC_IDC_EPISODE_DURATION: 1920 S
MDC_IDC_EPISODE_DURATION: 360 S
MDC_IDC_EPISODE_DURATION: 480 S
MDC_IDC_EPISODE_DURATION: 4800 S
MDC_IDC_EPISODE_DURATION: 5280 S
MDC_IDC_EPISODE_DURATION: 600 S
MDC_IDC_EPISODE_DURATION: 720 S
MDC_IDC_EPISODE_ID: 653
MDC_IDC_EPISODE_ID: 657
MDC_IDC_EPISODE_ID: 658
MDC_IDC_EPISODE_ID: 661
MDC_IDC_EPISODE_ID: 664
MDC_IDC_EPISODE_ID: 665
MDC_IDC_EPISODE_ID: 669
MDC_IDC_EPISODE_ID: 672
MDC_IDC_EPISODE_ID: 673
MDC_IDC_EPISODE_TYPE: NORMAL
MDC_IDC_PG_IMPLANT_DTM: NORMAL
MDC_IDC_PG_MFG: NORMAL
MDC_IDC_PG_MODEL: NORMAL
MDC_IDC_PG_SERIAL: NORMAL
MDC_IDC_PG_TYPE: NORMAL
MDC_IDC_SESS_CLINIC_NAME: NORMAL
MDC_IDC_SESS_DTM: NORMAL
MDC_IDC_SESS_TYPE: NORMAL

## 2024-12-05 NOTE — TELEPHONE ENCOUNTER
"I spoke with [place].   She was surprised to hear that her AF burden has increased since she normally feels when she is in it but has not recently. She has not taken any additional \"pill in the pocket\" metoprolol because of this.  She did have a bad bout of shortness of breath that was attributed to severe dehydration about a week ago. There were several AF episodes logged on that day.    She did state that she has been under a great deal of stress recently and wonders if this could be contributing to the increased burden.   She also has had an ongoing Uti resulting in several hospitalizations since September. She saw an Infectious Disease MD on Monday and is hopeful that the plan they have in place will finally treat the infection for good.    She is aware that an update will be routed to Dr. Zuluaga for review and recommendation.    TY LAZCANO  "

## 2024-12-05 NOTE — TELEPHONE ENCOUNTER
Called pt and updated her on Dr. Zuluaga's recommendations.  She was also wondering if all this afib would make her more tired and sluggish.  I did discuss with her that it could.      She stated that sometimes she doesn't even know that she is in it.  She asked if there was a way to instantly know if she was or not in afib.      Suggested that she could possibly buy a kardiamobile or an applewatch (but those devices sometimes are not always 100% accurate)     Told her that she could always call us to confirm during business hours.

## 2024-12-08 ENCOUNTER — MYC MEDICAL ADVICE (OUTPATIENT)
Dept: CARDIOLOGY | Facility: CLINIC | Age: 74
End: 2024-12-08
Payer: MEDICARE

## 2024-12-08 DIAGNOSIS — I48.0 PAROXYSMAL ATRIAL FIBRILLATION (H): ICD-10-CM

## 2024-12-09 ENCOUNTER — TELEPHONE (OUTPATIENT)
Dept: DERMATOLOGY | Facility: CLINIC | Age: 74
End: 2024-12-09

## 2024-12-09 ENCOUNTER — OFFICE VISIT (OUTPATIENT)
Dept: DERMATOLOGY | Facility: CLINIC | Age: 74
End: 2024-12-09
Attending: INTERNAL MEDICINE
Payer: MEDICARE

## 2024-12-09 DIAGNOSIS — D22.9 MULTIPLE NEVI: ICD-10-CM

## 2024-12-09 DIAGNOSIS — L82.1 SEBORRHEIC KERATOSES: ICD-10-CM

## 2024-12-09 DIAGNOSIS — L57.0 ACTINIC KERATOSES: ICD-10-CM

## 2024-12-09 DIAGNOSIS — Z85.828 HISTORY OF NONMELANOMA SKIN CANCER: ICD-10-CM

## 2024-12-09 DIAGNOSIS — Z94.4 LIVER REPLACED BY TRANSPLANT (H): ICD-10-CM

## 2024-12-09 DIAGNOSIS — L21.9 SEBORRHEIC DERMATITIS: ICD-10-CM

## 2024-12-09 DIAGNOSIS — D49.2 NEOPLASM OF UNSPECIFIED BEHAVIOR OF BONE, SOFT TISSUE, AND SKIN: ICD-10-CM

## 2024-12-09 DIAGNOSIS — L81.4 LENTIGINES: ICD-10-CM

## 2024-12-09 DIAGNOSIS — D18.01 CHERRY ANGIOMA: ICD-10-CM

## 2024-12-09 DIAGNOSIS — Z12.83 ENCOUNTER FOR SCREENING FOR MALIGNANT NEOPLASM OF SKIN: Primary | ICD-10-CM

## 2024-12-09 RX ORDER — METOPROLOL SUCCINATE 25 MG/1
25 TABLET, EXTENDED RELEASE ORAL 2 TIMES DAILY
Status: ON HOLD | COMMUNITY
Start: 2024-12-09

## 2024-12-09 RX ORDER — KETOCONAZOLE 20 MG/ML
SHAMPOO, SUSPENSION TOPICAL
Qty: 100 ML | Refills: 11 | Status: SHIPPED | OUTPATIENT
Start: 2024-12-09

## 2024-12-09 RX ORDER — LIDOCAINE 40 MG/G
CREAM TOPICAL ONCE
Qty: 15 G | Refills: 0 | Status: ON HOLD | OUTPATIENT
Start: 2024-12-09 | End: 2024-12-10

## 2024-12-09 RX ORDER — KETOCONAZOLE 20 MG/G
CREAM TOPICAL 2 TIMES DAILY PRN
Qty: 30 G | Refills: 3 | Status: SHIPPED | OUTPATIENT
Start: 2024-12-09

## 2024-12-09 ASSESSMENT — PAIN SCALES - GENERAL: PAINLEVEL_OUTOF10: NO PAIN (0)

## 2024-12-09 NOTE — PROGRESS NOTES
[facility] Dermatology Note  Encounter Date: Dec 9, 2024  Office Visit     Reviewed patients past medical history and pertinent chart review prior to patients visit today.     Dermatology Problem List:  # s/p liver transplant for PBC (2002)   - current: sirolimus, prednisone   # History NMSC, per patient report, tx in [place]  - SCC, R eyebrow, 2024  - SCC, R forehead, 2023  - SCC, L hand, 2021  - SCC, L upper arm, unknown date  # Pruritic papular skin eruption-self-induced, resolved  # Mild atopic dermatitis- hands, thumbs, abdomen, back   - previous: Amlactin, patient discontinued   # Actinic keratoses- right temple, left cheek, s/p LN2  # Onychomycosis-  Oral voriconazole, prescribed by Dr. Montero ([facility])   6# Drug hypersensitivity reaction- fluconazole  - residual lesions of abdomen and chest   # Atrophe shanna  # Drug allergies   -s/p intradermal testing to several ABX 8/2019, noncontributory  -plan: oral provocation testing       ____________________________________________    Assessment & Plan:     # Seborrheic dermatitis  Discussed that this is a recurring condition for most people and will need some maintenance even after rash has resolved. Plan as follows:  - Scalp: Start ketoconazole 2% shampoo three times weekly. Advised to lather in the shower, waiting 5-10 minutes before rinsing.   - Face and ears: Start ketoconazole 2% cream twice daily.    # Neoplasm of uncertain behavior:  right submandibular neck   DDx includes BCC vs ISK. Shave biopsy today.  - We discussed that the lesion is concerning for basal cell skin cancer. Risks and benefits of biopsy discussed, the patient verbally consented. The site was cleaned with alcohol and injected in lidocaine with epinephrine. The patient did not tolerate the pain well and requested discontinuation of the shave biopsy procedure. She requested to follow up for this at a later date. I prescribed lidocaine 4% cream to be applied to the site for 1 hour  prior to shave biopsy procedure. We discussed that intralesional anesthesia will still be required prior to biopsy. Follow up order placed.     #Actinic keratosis x8  - We discussed the precancerous nature of the skin lesions.  I recommended liquid nitrogen cryotherapy and the patient was agreeable.      Cryotherapy procedure note, location(s): forehead x3, right nasal tip x1, left dorsal hand x4 After verbal consent and discussion of risks and benefits including, but not limited to, dyspigmentation/scar, blister, and pain, the lesion(s) was(were) treated with 1-2 mm freeze border for 1-2 cycles with liquid nitrogen. Post cryotherapy instructions were provided.    # Intertrigo  - I prescribed ketoconazole 2% cream to be applied twice daily for up to two weeks for flares.  We discussed the importance of keeping areas dry.  I recommended over the counter anti-fungal powders daily for maintenance, such as Zeasorb excess moisture.     # Chronic immunosuppression  # Multiple nevi, trunk and extremities  # Solar lentigines  - No concerning features on dermoscopy. We discussed the importance of self exams at home.   - ABCDEs: Counseled ABCDEs of melanoma: Asymmetry, Border (irregularity), Color (not uniform, changes in color), Diameter (greater than 6 mm which is about the size of a pencil eraser), and Evolving (any changes in preexisting moles).  - Sun protection: Counseled SPF 30+ sunscreen, UPF clothing, sun avoidance, tanning bed avoidance.    # Cherry angiomas  # Seborrheic keratoses  - We discussed the benign nature of the skin lesions. No treatment required. Continued observation recommended. Follow up with any concerns.      Follow-up:  Annual for follow up full body skin exam, as needed for new or changing lesions or new concerns    I attest that I recorded the interview and Dr Pacheco personally performed the exam.   All risks, benefits and alternatives were discussed with patient.  Patient is in agreement and  understands the assessment and plan.  All questions were answered.  Edel Blair PA-C  [facility] Dermatology    ____________________________________________    CC: Skin Check ([place]ia is here today for a skin check. She is concerned about lesions on the top of the scalp, under the chin, left cheek and left hand.)    HPI:  Ms. Luz Thompson is a(n) 74 year old female who presents today as a new patient for a full body skin cancer screening. The patient has a history of liver transplant, chronically immunosuppressed. The patient denies a personal history of skin cancer. The patient requests evaluation of a lesion on the scalp, under the chin, left cheek and left hand. Lesions are scaly and itchy. No other specific cutaneous concerns today. The patient reports trying to be diligent with photoprotection.      Physical Exam:  Vitals: LMP 06/01/1988 (Approximate)   SKIN: Total skin excluding the genitalia areas was performed. The exam included the scalp, face, neck, bilateral arms, chest, back, abdomen, bilateral legs, digits, mons pubis, buttocks, and nails.   - Royal II.  - Greasy yellow scale with background erythema involving the scalp, ears, and eyebrows.   - Pink macule(s) with gritty scale involving the forehead x3, right nasal tip x1, left dorsal hand x4, consistent with actinic keratosis.   - Skin folds demonstrate moist, pink, well demarcated patches, consistent with intertrigo.  - Involving the right submandibular neck is a 1.2 x 1.0 cm pink papule with adherent scale.   - Multiple tan/brown macules and papules scattered throughout exam, consistent with benign nevi. No concerning features on dermoscopy.   - Scattered tan, homogenous macules scattered on sun exposed skin, consistent with solar lentigines.   - Scattered waxy, stuck on appearing papules and patches, consistent with seborrheic keratoses.    - Several 1-2 mm red dome shaped symmetric papules, consistent with cherry angiomas.      Medications:  Current Outpatient Medications   Medication Sig Dispense Refill    apixaban ANTICOAGULANT (ELIQUIS ANTICOAGULANT) 2.5 MG tablet Take 1 tablet (2.5 mg) by mouth 2 times daily 180 tablet 3    azelastine (ASTELIN) 0.1 % nasal spray Spray 1 spray into both nostrils as needed for allergies or rhinitis.      calcitRIOL (ROCALTROL) 0.25 MCG capsule Take 1 capsule (0.25 mcg) by mouth daily. 90 capsule 3    D-MANNOSE PO Take 1 Scoop by mouth 2 times daily.      estradiol (ESTRACE) 0.1 MG/GM vaginal cream Place vaginally every other day.      evolocumab (REPATHA SURECLICK) 140 MG/ML prefilled autoinjector Inject 1 mL (140 mg) subcutaneously every 14 days. . Please have fasting labs drawn for further refills. 6 mL 3    ezetimibe (ZETIA) 10 MG tablet Take 1 tablet (10 mg) by mouth daily. 90 tablet 3    Ferrous Sulfate 324 MG TBEC Take 1 tablet by mouth 2 times daily.      folic acid (FOLVITE) 1 MG tablet Take 1 tablet (1 mg) by mouth daily. 100 tablet 0    fosfomycin (MONUROL) 3 g Packet Take 1 packet (3 g) by mouth every other day for 5 doses. 5 packet 0    fosfomycin (MONUROL) 3 g Packet Every other day for 3 doses, dispense 3 packs 3 packet 0    fosfomycin (MONUROL) 3 g Packet Take 1 packet (3 g) by mouth every other day. 3 packet 0    furosemide (LASIX) 20 MG tablet Take 1 tablet (20 mg) by mouth daily 90 tablet 3    gabapentin (NEURONTIN) 250 MG/5ML solution Take 6 mLs (300 mg) by mouth at bedtime 180 mL 0    glucose (BD GLUCOSE) 5 g chewable tablet Take 2 tablets (10 g) by mouth as needed (low blood sugar) 40 tablet 1    hydrALAZINE (APRESOLINE) 50 MG tablet Take 1 tablet (50 mg) by mouth 3 times daily. 270 tablet 3    hypromellose (ARTIFICIAL TEARS) 0.4 % SOLN ophthalmic solution Apply 1 drop to eye every hour as needed for dry eyes      levothyroxine (SYNTHROID/LEVOTHROID) 175 MCG tablet Take 1 tablet (175 mcg) by mouth daily. 90 tablet 3    linagliptin (TRADJENTA) 5 MG TABS tablet Take 1 tablet (5 mg)  by mouth daily 90 tablet 1    meclizine (ANTIVERT) 25 MG tablet Take 1 tablet (25 mg) by mouth as needed for dizziness or other (migraines) 30 tablet 11    metoprolol succinate ER (TOPROL XL) 25 MG 24 hr tablet Take 1 tablet (25 mg) by mouth daily. Take an extra tablet daily as needed for breakthrough afib. May repeat in two hours if heart rate remains >100bpm (no more than 3 tablets per day). 135 tablet 3    Multiple Vitamins-Minerals (PRESERVISION AREDS 2) CAPS Take 1 capsule by mouth 2 times daily      Nutrisource Fiber PO packet Take 1 packet by mouth daily.      omega-3 acid ethyl esters (LOVAZA) 1 g capsule Take 1 capsule by mouth 2 times daily 180 capsule 1    predniSONE (DELTASONE) 10 MG tablet Take 1 tablet (10 mg) by mouth daily. 90 tablet 3    sirolimus (GENERIC EQUIVALENT) 1 MG tablet Take 1 tablet (1 mg) by mouth daily. 90 tablet 3    ursodiol (ACTIGALL) 250 MG tablet Take 1 tablet (250 mg) by mouth 2 times daily. 180 tablet 3     No current facility-administered medications for this visit.      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    Prophylactic antibiotic    High risk medication use    Statin intolerance    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    Sensorineural hearing loss (SNHL) of both ears    Abnormal liver function tests    Liver replaced by transplant (H)    Hyperlipidemia LDL goal <70    Hypertension goal BP (blood pressure) < 140/80    Postoperative hypothyroidism    Type 2 diabetes mellitus with stage 3b chronic kidney disease, without long-term current use of insulin (H)    Age-related osteoporosis without current pathological fracture    Tympanic membrane perforation, left    Other infective chronic otitis externa of left ear    Stage 3b chronic kidney disease (H)  "   Escherichia coli sepsis (H)    Physical deconditioning    Immunosuppression (H)    Papillary thyroid carcinoma (H)    PAF (paroxysmal atrial fibrillation) (H)    Status post liver transplantation (H)    Elevated lactic acid level    Fever in adult    Urinary tract infection    Chronic kidney disease, unspecified CKD stage    Lung infection    Acute cystitis without hematuria    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  "

## 2024-12-09 NOTE — TELEPHONE ENCOUNTER
10 AF episodes logged since 10/3/24, lasting anywhere from 8min to 2h58m. EGMs are logged back to back at times, so maybe 1 longer episode split up d/t undersensing. ECGs available for review confirm AF, rates 's per histogram.

## 2024-12-09 NOTE — TELEPHONE ENCOUNTER
Message from EP team  Roz Hannon RN  P Sanchez [facility] Heart Team 2  Phone Number: [phone]     Please refer to 12/2/24 encounter. Patient's concerns should be forwarded to Dr. Zuluaga.     My chart message from patient:  While Dr Zuluaga may contribute this to infection I believe it is due to stress. I get very stressed when I go into Afib and that contributes to severity and duration. Yesterday it lasted fot an hour and I was so tired when it quit I missed my daughter's open house. Currently my pulse is all over the place from 160 to 50 and back up to over 100. I can't plan anything because it just happens. I have a lot of things I need to take care of and I need to be healthy!

## 2024-12-09 NOTE — TELEPHONE ENCOUNTER
Reply from Dr. Zuluaga:  Kirill Zuluaga MD [place], Alyson BOO, RN  Phone Number: [phone]     She is right, stress is probably a major contributor.  Have her increase her metoprolol succinate to 25 bid.  How is her blood pressure doing?  She is on the higher dose of hydralazine I presume.   ===========  1300 attempted to contact patient with update. Left a detailed message and sent medication instructions by my chart.    Ms. Jay Alvarado,    Dr. Zuluaga reviewed your message and recommends increase the metoprolol succinate to 25 twice daily. Continue with the as-needed plan for break-through episodes. Let us know if you are ready for a refill with this new dose plan.    Dr. Zuluaga is also wondering if increasing the hydralazine is helping with your blood pressure. Please send an update of your home readings.    Thank you  Team 2 R.N.s  [phone]

## 2024-12-09 NOTE — TELEPHONE ENCOUNTER
My chart message from patient:  Luz Thompson Laura [facility] Heart Team 2  Phone Number: [phone]     I know you want these done soon but I am exactly a week late on taking my Rapatha.  Do you want to postpone labs until I get back on track?      Reply to patient:  Jenny Ms. Thompson,    If you would like to delay your lab test a bit, that will be OK. We have a note that you are setting this up at your [facility].     Thank you for checking  Team 2 R.N.s  [phone]

## 2024-12-09 NOTE — TELEPHONE ENCOUNTER
Reply from patient:  Luz Thompson  ONELIA Sanchez [facility] Heart Team 2  Phone Number: [phone]     Received your message and will increase med as ordered. Still trying to get time table consistent for Hydralazine 3xday.

## 2024-12-09 NOTE — TELEPHONE ENCOUNTER
Left Voicemail (1st Attempt) and Sent Mychart (1st Attempt) for the patient to call back and schedule the following:    Appointment type: Return Derm   Provider: Edel Blair PA-C   Return date: Next available - per clinic   Specialty phone number: [phone]  Additional appointment(s) needed: n/a  Additonal Notes: Follow-up for Biopsy schedule         Evelyne Quinn on 12/9/2024 at 2:56 PM

## 2024-12-09 NOTE — PATIENT INSTRUCTIONS
Patient Education         Start Zeasorb excess moisture powder below breasts daily       Proper skin care from [facility]:     -Eliminate harsh soaps as they strip the natural oils from the skin, often resulting in dry itchy skin ( i.e. Dial, Zest, Daphnie Spring)  -Use mild soaps such as Cetaphil or Dove Sensitive Skin in the shower. You do not need to use soap on arms, legs, and trunk every time you shower unless visibly soiled.   -Avoid hot or cold showers.  -After showering, lightly dry off and apply moisturizing within 2-3 minutes. This will help trap moisture in the skin.   -Aggressive use of a moisturizer at least 1-2 times a day to the entire body (including -Vanicream, Cetaphil, Aquaphor or Cerave) and moisturize hands after every washing.  -We recommend using moisturizers that come in a tub that needs to be scooped out, not a pump. This has more of an oil base. It will hold moisture in your skin much better than a water base moisturizer. The above recommended are non-pore clogging.        Wear a sunscreen with at least SPF 30 on your face, ears, neck and V of the chest daily. Wear sunscreen on other areas of the body if those areas are exposed to the sun throughout the day. Sunscreens can contain physical and/or chemical blockers. Physical blockers are less likely to clog pores, these include zinc oxide and titanium dioxide. Reapply every two hour and after swimming.      Sunscreen examples: https://www.ewg.org/sunscreen/     UV radiation  UVA radiation remains constant throughout the day and throughout the year. It is a longer wavelength than UVB and therefore penetrates deeper into the skin leading to immediate and delayed tanning, photoaging, and skin cancer. 70-80% of UVA and UVB radiation occurs between the hours of 10am-2pm.  UVB radiation  UVB radiation causes the most harmful effects and is more significant during the summer months. However, snow and ice can reflect UVB radiation leading to  skin damage during the winter months as well. UVB radiation is responsible for tanning, burning, inflammation, delayed erythema (pinkness), pigmentation (brown spots), and skin cancer.      I recommend self monthly full body exams and yearly full body exams with a dermatology provider. If you develop a new or changing lesion please follow up for examination. Most skin cancers are pink and scaly or pink and pearly. However, we do see blue/brown/black skin cancers.  Consider the ABCDEs of melanoma when giving yourself your monthly full body exam ( don't forget the groin, buttocks, feet, toes, etc). A-asymmetry, B-borders, C-color, D-diameter, E-elevation or evolving. If you see any of these changes please follow up in clinic. If you cannot see your back I recommend purchasing a hand held mirror to use with a larger wall mirror.       Checking for Skin Cancer  You can find cancer early by checking your skin each month. There are 3 kinds of skin cancer. They are melanoma, basal cell carcinoma, and squamous cell carcinoma. Doing monthly skin checks is the best way to find new marks or skin changes. Follow the instructions below for checking your skin.   The ABCDEs of checking moles for melanoma   Check your moles or growths for signs of melanoma using ABCDE:   Asymmetry: the sides of the mole or growth don t match  Border: the edges are ragged, notched, or blurred  Color: the color within the mole or growth varies  Diameter: the mole or growth is larger than 6 mm (size of a pencil eraser)  Evolving: the size, shape, or color of the mole or growth is changing (evolving is not shown in the images below)    Checking for other types of skin cancer  Basal cell carcinoma or squamous cell carcinoma have symptoms such as:      A spot or mole that looks different from all other marks on your skin  Changes in how an area feels, such as itching, tenderness, or pain  Changes in the skin's surface, such as oozing, bleeding, or  scaliness  A sore that does not heal  New swelling or redness beyond the border of a mole     Who s at risk?  Anyone can get skin cancer. But you are at greater risk if you have:   Fair skin, light-colored hair, or light-colored eyes  Many moles or abnormal moles on your skin  A history of sunburns from sunlight or tanning beds  A family history of skin cancer  A history of exposure to radiation or chemicals  A weakened immune system  If you have had skin cancer in the past, you are at risk for recurring skin cancer.   How to check your skin  Do your monthly skin checkups in front of a full-length mirror. Check all parts of your body, including your:   Head (ears, face, neck, and scalp)  Torso (front, back, and sides)  Arms (tops, undersides, upper, and lower armpits)  Hands (palms, backs, and fingers, including under the nails)  Buttocks and genitals  Legs (front, back, and sides)  Feet (tops, soles, toes, including under the nails, and between toes)  If you have a lot of moles, take digital photos of them each month. Make sure to take photos both up close and from a distance. These can help you see if any moles change over time.   Most skin changes are not cancer. But if you see any changes in your skin, call your doctor right away. Only he or she can diagnose a problem. If you have skin cancer, seeing your doctor can be the first step toward getting the treatment that could save your life.   [employer] last reviewed this educational content on 4/1/2019 2000-2020 [employer]. [address] [place] [address], [place] [address]. All rights reserved. This information is not intended as a substitute for professional medical care. Always follow your healthcare professional's instructions.        When should I call my doctor?  If you are worsening or not improving, please, contact us or seek urgent care as noted below.      Who should I call with questions (adults)?  [facility]  (adult and pediatric): [phone]  [facility] (adult): [phone]  [facility] ([place], [place], [place] and [place]) [phone]  For urgent needs outside of business hours call the [facility] at [phone] and ask for the dermatology resident on call to be paged  If this is a medical emergency and you are unable to reach an ER, Call [phone]        If you need a prescription refill, please contact your pharmacy. Refills are approved or denied by our Physicians during normal business hours, Monday through Fridays  Per office policy, refills will not be granted if you have not been seen within the past year (or sooner depending on your child's condition)     Wound Care After a Biopsy    What is a skin biopsy?  A skin biopsy allows the doctor to examine a very small piece of tissue under the microscope to determine the diagnosis and the best treatment for the skin condition. A local anesthetic (numbing medicine) is injected with a very small needle into the skin area to be tested. A small piece of skin is taken from the area. Sometimes a suture (stitch) is used.     What are the risks of a skin biopsy?  I will experience scar, bleeding, swelling, pain, crusting and redness. I may experience incomplete removal or recurrence. Risks of this procedure are excessive bleeding, bruising, infection, nerve damage, numbness, thick (hypertrophic or keloidal) scar and non-diagnostic biopsy.    How should I care for my wound for the first 24 hours?  Keep the wound dry and covered for 24 hours  If it bleeds, hold direct pressure on the area for 15 minutes. If bleeding does not stop, call us or go to the emergency room  Avoid strenuous exercise the first 1-2 days or as your doctor instructs you    How should I care for the wound after 24 hours?  After 24 hours, remove the bandage  You may bathe or shower as normal  If you had a scalp biopsy, you can shampoo  as usual and can use shower water to clean the biopsy site daily  Clean the wound once a day with gentle soap and water  Do not scrub, be gentle  Apply white petroleum/Vaseline after cleaning the wound with a cotton swab or a clean finger, and keep the site covered with a Bandaid /bandage. Bandages are not necessary with a scalp biopsy  If you are unable to cover the site with a Bandaid /bandage, re-apply ointment 2-3 times a day to keep the site moist. Moisture will help with healing  Avoid strenuous activity for first 1-2 days  Avoid lakes, rivers, pools, and oceans until the stitches are removed or the site is healed    How do I clean my wound?  Wash hands thoroughly with soap or use hand  before all wound care  Clean the wound with gentle soap and water  Apply white petroleum/Vaseline  to wound after it is clean  Replace the Bandaid /bandage to keep the wound covered for the first few days or as instructed by your doctor  If you had a scalp biopsy, warm shower water to the area on a daily basis should suffice    What should I use to clean my wound?   Cotton-tipped applicators (Qtips )  White petroleum jelly (Vaseline ). Use a clean new container and use Q-tips to apply.  Bandaids  as needed  Gentle soap     How should I care for my wound long term?  Do not get your wound dirty  Keep up with wound care for one week or until the area is healed.  If you have stitches, stitches need to be removed in 14 days. You may return to our clinic for this or you may have it done locally at your doctor s office.  A small scab will form and fall off by itself when the area is completely healed. The area will be red and will become pink in color as it heals. Sun protection is very important for how your scar will turn out. Sunscreen with an SPF 30 or greater is recommended once the area is healed.  You should have some soreness but it should be mild and slowly go away over several days. Talk to your doctor about using  tylenol for pain,    When should I call my doctor?  If you have increased:   Pain or swelling  Pus or drainage (clear or slightly yellow drainage is ok)  Temperature over 100F  Spreading redness or warmth around wound    When will I hear about my results?  The biopsy results can take 2 weeks to come back.  Your results will automatically release to Our Security Team before your provider has even reviewed them.  The clinic will call you with the results, send you a Our Security Team message, or have you schedule a follow-up clinic or phone time to discuss the results.  Contact our clinics if you do not hear from us in 2 weeks.    Who should I call with questions?  [facility]: [phone]  [facility]: [phone]  For urgent needs outside of business hours call the [facility] at [phone] and ask for the dermatology resident on call

## 2024-12-09 NOTE — LETTER
12/9/2024       RE: Luz Thompson  [address]  [place] [address]     Dear Colleague,    Thank you for referring your patient, Luz Thompson, to the [facility] [place] [facility]. Please see a copy of my visit note below.    [facility] Note  Encounter Date: Dec 9, 2024  Office Visit     Reviewed patients past medical history and pertinent chart review prior to patients visit today.     Dermatology Problem List:  # s/p liver transplant for PBC (2002)   - current: sirolimus, prednisone   # History NMSC, per patient report, tx in [place]  - SCC, R eyebrow, 2024  - SCC, R forehead, 2023  - SCC, L hand, 2021  - SCC, L upper arm, unknown date  # Pruritic papular skin eruption-self-induced, resolved  # Mild atopic dermatitis- hands, thumbs, abdomen, back   - previous: Amlactin, patient discontinued   # Actinic keratoses- right temple, left cheek, s/p LN2  # Onychomycosis-  Oral voriconazole, prescribed by Dr. Montero ([facility])   6# Drug hypersensitivity reaction- fluconazole  - residual lesions of abdomen and chest   # Atrophe shanna  # Drug allergies   -s/p intradermal testing to several ABX 8/2019, noncontributory  -plan: oral provocation testing       ____________________________________________    Assessment & Plan:     # Seborrheic dermatitis  Discussed that this is a recurring condition for most people and will need some maintenance even after rash has resolved. Plan as follows:  - Scalp: Start ketoconazole 2% shampoo three times weekly. Advised to lather in the shower, waiting 5-10 minutes before rinsing.   - Face and ears: Start ketoconazole 2% cream twice daily.    # Neoplasm of uncertain behavior:  right submandibular neck   DDx includes BCC vs ISK. Shave biopsy today.  - We discussed that the lesion is concerning for basal cell skin cancer. Risks and benefits of biopsy discussed, the  patient verbally consented. The site was cleaned with alcohol and injected in lidocaine with epinephrine. The patient did not tolerate the pain well and requested discontinuation of the shave biopsy procedure. She requested to follow up for this at a later date. I prescribed lidocaine 4% cream to be applied to the site for 1 hour prior to shave biopsy procedure. We discussed that intralesional anesthesia will still be required prior to biopsy. Follow up order placed.     #Actinic keratosis x8  - We discussed the precancerous nature of the skin lesions.  I recommended liquid nitrogen cryotherapy and the patient was agreeable.      Cryotherapy procedure note, location(s): forehead x3, right nasal tip x1, left dorsal hand x4 After verbal consent and discussion of risks and benefits including, but not limited to, dyspigmentation/scar, blister, and pain, the lesion(s) was(were) treated with 1-2 mm freeze border for 1-2 cycles with liquid nitrogen. Post cryotherapy instructions were provided.    # Intertrigo  - I prescribed ketoconazole 2% cream to be applied twice daily for up to two weeks for flares.  We discussed the importance of keeping areas dry.  I recommended over the counter anti-fungal powders daily for maintenance, such as Zeasorb excess moisture.     # Chronic immunosuppression  # Multiple nevi, trunk and extremities  # Solar lentigines  - No concerning features on dermoscopy. We discussed the importance of self exams at home.   - ABCDEs: Counseled ABCDEs of melanoma: Asymmetry, Border (irregularity), Color (not uniform, changes in color), Diameter (greater than 6 mm which is about the size of a pencil eraser), and Evolving (any changes in preexisting moles).  - Sun protection: Counseled SPF 30+ sunscreen, UPF clothing, sun avoidance, tanning bed avoidance.    # Cherry angiomas  # Seborrheic keratoses  - We discussed the benign nature of the skin lesions. No treatment required. Continued observation  recommended. Follow up with any concerns.      Follow-up:  Annual for follow up full body skin exam, as needed for new or changing lesions or new concerns    I attest that I recorded the interview and Dr Pacheco personally performed the exam.   All risks, benefits and alternatives were discussed with patient.  Patient is in agreement and understands the assessment and plan.  All questions were answered.  Edel Blair PA-C  [facility]    ____________________________________________    CC: Skin Check ([place] is here today for a skin check. She is concerned about lesions on the top of the scalp, under the chin, left cheek and left hand.)    HPI:  Ms. Luz Thompson is a(n) 74 year old female who presents today as a new patient for a full body skin cancer screening. The patient has a history of liver transplant, chronically immunosuppressed. The patient denies a personal history of skin cancer. The patient requests evaluation of a lesion on the scalp, under the chin, left cheek and left hand. Lesions are scaly and itchy. No other specific cutaneous concerns today. The patient reports trying to be diligent with photoprotection.      Physical Exam:  Vitals: LMP 06/01/1988 (Approximate)   SKIN: Total skin excluding the genitalia areas was performed. The exam included the scalp, face, neck, bilateral arms, chest, back, abdomen, bilateral legs, digits, mons pubis, buttocks, and nails.   - Royal II.  - Greasy yellow scale with background erythema involving the scalp, ears, and eyebrows.   - Pink macule(s) with gritty scale involving the forehead x3, right nasal tip x1, left dorsal hand x4, consistent with actinic keratosis.   - Skin folds demonstrate moist, pink, well demarcated patches, consistent with intertrigo.  - Involving the right submandibular neck is a 1.2 x 1.0 cm pink papule with adherent scale.   - Multiple tan/brown macules and papules scattered throughout exam, consistent with  benign nevi. No concerning features on dermoscopy.   - Scattered tan, homogenous macules scattered on sun exposed skin, consistent with solar lentigines.   - Scattered waxy, stuck on appearing papules and patches, consistent with seborrheic keratoses.    - Several 1-2 mm red dome shaped symmetric papules, consistent with cherry angiomas.     Medications:  Current Outpatient Medications   Medication Sig Dispense Refill     apixaban ANTICOAGULANT (ELIQUIS ANTICOAGULANT) 2.5 MG tablet Take 1 tablet (2.5 mg) by mouth 2 times daily 180 tablet 3     azelastine (ASTELIN) 0.1 % nasal spray Spray 1 spray into both nostrils as needed for allergies or rhinitis.       calcitRIOL (ROCALTROL) 0.25 MCG capsule Take 1 capsule (0.25 mcg) by mouth daily. 90 capsule 3     D-MANNOSE PO Take 1 Scoop by mouth 2 times daily.       estradiol (ESTRACE) 0.1 MG/GM vaginal cream Place vaginally every other day.       evolocumab (REPATHA SURECLICK) 140 MG/ML prefilled autoinjector Inject 1 mL (140 mg) subcutaneously every 14 days. . Please have fasting labs drawn for further refills. 6 mL 3     ezetimibe (ZETIA) 10 MG tablet Take 1 tablet (10 mg) by mouth daily. 90 tablet 3     Ferrous Sulfate 324 MG TBEC Take 1 tablet by mouth 2 times daily.       folic acid (FOLVITE) 1 MG tablet Take 1 tablet (1 mg) by mouth daily. 100 tablet 0     fosfomycin (MONUROL) 3 g Packet Take 1 packet (3 g) by mouth every other day for 5 doses. 5 packet 0     fosfomycin (MONUROL) 3 g Packet Every other day for 3 doses, dispense 3 packs 3 packet 0     fosfomycin (MONUROL) 3 g Packet Take 1 packet (3 g) by mouth every other day. 3 packet 0     furosemide (LASIX) 20 MG tablet Take 1 tablet (20 mg) by mouth daily 90 tablet 3     gabapentin (NEURONTIN) 250 MG/5ML solution Take 6 mLs (300 mg) by mouth at bedtime 180 mL 0     glucose (BD GLUCOSE) 5 g chewable tablet Take 2 tablets (10 g) by mouth as needed (low blood sugar) 40 tablet 1     hydrALAZINE (APRESOLINE) 50 MG  tablet Take 1 tablet (50 mg) by mouth 3 times daily. 270 tablet 3     hypromellose (ARTIFICIAL TEARS) 0.4 % SOLN ophthalmic solution Apply 1 drop to eye every hour as needed for dry eyes       levothyroxine (SYNTHROID/LEVOTHROID) 175 MCG tablet Take 1 tablet (175 mcg) by mouth daily. 90 tablet 3     linagliptin (TRADJENTA) 5 MG TABS tablet Take 1 tablet (5 mg) by mouth daily 90 tablet 1     meclizine (ANTIVERT) 25 MG tablet Take 1 tablet (25 mg) by mouth as needed for dizziness or other (migraines) 30 tablet 11     metoprolol succinate ER (TOPROL XL) 25 MG 24 hr tablet Take 1 tablet (25 mg) by mouth daily. Take an extra tablet daily as needed for breakthrough afib. May repeat in two hours if heart rate remains >100bpm (no more than 3 tablets per day). 135 tablet 3     Multiple Vitamins-Minerals (PRESERVISION AREDS 2) CAPS Take 1 capsule by mouth 2 times daily       Nutrisource Fiber PO packet Take 1 packet by mouth daily.       omega-3 acid ethyl esters (LOVAZA) 1 g capsule Take 1 capsule by mouth 2 times daily 180 capsule 1     predniSONE (DELTASONE) 10 MG tablet Take 1 tablet (10 mg) by mouth daily. 90 tablet 3     sirolimus (GENERIC EQUIVALENT) 1 MG tablet Take 1 tablet (1 mg) by mouth daily. 90 tablet 3     ursodiol (ACTIGALL) 250 MG tablet Take 1 tablet (250 mg) by mouth 2 times daily. 180 tablet 3     No current facility-administered medications for this visit.      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     Prophylactic antibiotic     High risk medication use     Statin intolerance     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     Sensorineural hearing loss (SNHL) of both ears     Abnormal liver function tests     Liver replaced by transplant (H)     Hyperlipidemia LDL goal <70      "Hypertension goal BP (blood pressure) < 140/80     Postoperative hypothyroidism     Type 2 diabetes mellitus with stage 3b chronic kidney disease, without long-term current use of insulin (H)     Age-related osteoporosis without current pathological fracture     Tympanic membrane perforation, left     Other infective chronic otitis externa of left ear     Stage 3b chronic kidney disease (H)     Escherichia coli sepsis (H)     Physical deconditioning     Immunosuppression (H)     Papillary thyroid carcinoma (H)     PAF (paroxysmal atrial fibrillation) (H)     Status post liver transplantation (H)     Elevated lactic acid level     Fever in adult     Urinary tract infection     Chronic kidney disease, unspecified CKD stage     Lung infection     Acute cystitis without hematuria     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      Deep venous thrombosis      Diverticulosis of sigmoid colon 12/21/2013     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, history of     Received Rituxan during Summer of 2016     Glaucoma      H/O esophageal varices      Hearing loss      Hyperlipidemia 04/10/2012    Says that she does not have it anymore, not on meds     Hypertension      Hypertriglyceridemia      Liver replaced by transplant (H) 10/17/2011    Dr. 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      Again, thank you for allowing me to participate in the care of your patient.      Sincerely,    [facility] SKIN CHECK

## 2024-12-09 NOTE — NURSING NOTE
Dermatology Rooming Note    Luz Thompson's goals for this visit include:   Chief Complaint   Patient presents with    Skin Check     [place]ia is here today for a skin check. She is concerned about lesions on the top of the scalp, under the chin, left cheek and left hand.     Kevin HUANG, BHARATHI

## 2024-12-09 NOTE — PROGRESS NOTES
# Hx SCC  - right eyebrow, 06/2024  - Right forehead, 2023  - Left hand, 2021  - left upper arm, unknown date, treated with outside ***

## 2024-12-10 ENCOUNTER — APPOINTMENT (OUTPATIENT)
Dept: CT IMAGING | Facility: CLINIC | Age: 74
DRG: 872 | End: 2024-12-10
Attending: EMERGENCY MEDICINE
Payer: MEDICARE

## 2024-12-10 ENCOUNTER — HOSPITAL ENCOUNTER (INPATIENT)
Facility: CLINIC | Age: 74
DRG: 872 | End: 2024-12-10
Attending: EMERGENCY MEDICINE | Admitting: INTERNAL MEDICINE
Payer: MEDICARE

## 2024-12-10 DIAGNOSIS — N10 ACUTE PYELONEPHRITIS: ICD-10-CM

## 2024-12-10 DIAGNOSIS — T78.2XXS ANAPHYLAXIS, SEQUELA: ICD-10-CM

## 2024-12-10 DIAGNOSIS — N30.00 ACUTE CYSTITIS WITHOUT HEMATURIA: ICD-10-CM

## 2024-12-10 DIAGNOSIS — D84.9 IMMUNOCOMPROMISED (H): ICD-10-CM

## 2024-12-10 DIAGNOSIS — N10 PYELONEPHRITIS, ACUTE: ICD-10-CM

## 2024-12-10 DIAGNOSIS — Z94.4 H/O LIVER TRANSPLANT (H): ICD-10-CM

## 2024-12-10 DIAGNOSIS — R10.84 GENERALIZED ABDOMINAL PAIN: ICD-10-CM

## 2024-12-10 DIAGNOSIS — A41.9 SEPSIS WITHOUT ACUTE ORGAN DYSFUNCTION, DUE TO UNSPECIFIED ORGANISM (H): ICD-10-CM

## 2024-12-10 DIAGNOSIS — R10.9 LEFT FLANK PAIN: ICD-10-CM

## 2024-12-10 DIAGNOSIS — Z22.358 ESBL ESCHERICHIA COLI CARRIER: Primary | ICD-10-CM

## 2024-12-10 LAB
ALBUMIN SERPL BCG-MCNC: 3.6 G/DL (ref 3.5–5.2)
ALBUMIN UR-MCNC: 50 MG/DL
ALP SERPL-CCNC: 101 U/L (ref 40–150)
ALT SERPL W P-5'-P-CCNC: 30 U/L (ref 0–50)
ANION GAP SERPL CALCULATED.3IONS-SCNC: 18 MMOL/L (ref 7–15)
APPEARANCE UR: ABNORMAL
AST SERPL W P-5'-P-CCNC: 32 U/L (ref 0–45)
BACTERIA #/AREA URNS HPF: ABNORMAL /HPF
BASOPHILS # BLD AUTO: 0 10E3/UL (ref 0–0.2)
BASOPHILS NFR BLD AUTO: 0 %
BILIRUB SERPL-MCNC: 0.6 MG/DL
BILIRUB UR QL STRIP: NEGATIVE
BUN SERPL-MCNC: 44.5 MG/DL (ref 8–23)
CALCIUM SERPL-MCNC: 9.4 MG/DL (ref 8.8–10.4)
CHLORIDE SERPL-SCNC: 101 MMOL/L (ref 98–107)
COLOR UR AUTO: YELLOW
CREAT SERPL-MCNC: 1.75 MG/DL (ref 0.51–0.95)
EGFRCR SERPLBLD CKD-EPI 2021: 30 ML/MIN/1.73M2
EOSINOPHIL # BLD AUTO: 0.1 10E3/UL (ref 0–0.7)
EOSINOPHIL NFR BLD AUTO: 1 %
ERYTHROCYTE [DISTWIDTH] IN BLOOD BY AUTOMATED COUNT: 16.2 % (ref 10–15)
EST. AVERAGE GLUCOSE BLD GHB EST-MCNC: 151 MG/DL
FLUAV RNA SPEC QL NAA+PROBE: NEGATIVE
FLUBV RNA RESP QL NAA+PROBE: NEGATIVE
GLUCOSE BLDC GLUCOMTR-MCNC: 126 MG/DL (ref 70–99)
GLUCOSE SERPL-MCNC: 121 MG/DL (ref 70–99)
GLUCOSE UR STRIP-MCNC: NEGATIVE MG/DL
HBA1C MFR BLD: 6.9 %
HCO3 SERPL-SCNC: 17 MMOL/L (ref 22–29)
HCT VFR BLD AUTO: 32.9 % (ref 35–47)
HGB BLD-MCNC: 10.8 G/DL (ref 11.7–15.7)
HGB UR QL STRIP: NEGATIVE
HOLD SPECIMEN: NORMAL
HOLD SPECIMEN: NORMAL
IMM GRANULOCYTES # BLD: 0.2 10E3/UL
IMM GRANULOCYTES NFR BLD: 1 %
KETONES UR STRIP-MCNC: NEGATIVE MG/DL
LACTATE SERPL-SCNC: 1.8 MMOL/L (ref 0.7–2)
LEUKOCYTE ESTERASE UR QL STRIP: ABNORMAL
LIPASE SERPL-CCNC: 33 U/L (ref 13–60)
LYMPHOCYTES # BLD AUTO: 2.2 10E3/UL (ref 0.8–5.3)
LYMPHOCYTES NFR BLD AUTO: 14 %
MCH RBC QN AUTO: 29.1 PG (ref 26.5–33)
MCHC RBC AUTO-ENTMCNC: 32.8 G/DL (ref 31.5–36.5)
MCV RBC AUTO: 89 FL (ref 78–100)
MONOCYTES # BLD AUTO: 0.9 10E3/UL (ref 0–1.3)
MONOCYTES NFR BLD AUTO: 6 %
NEUTROPHILS # BLD AUTO: 11.9 10E3/UL (ref 1.6–8.3)
NEUTROPHILS NFR BLD AUTO: 78 %
NITRATE UR QL: NEGATIVE
NRBC # BLD AUTO: 0 10E3/UL
NRBC BLD AUTO-RTO: 0 /100
PH UR STRIP: 5.5 [PH] (ref 5–7)
PLATELET # BLD AUTO: 335 10E3/UL (ref 150–450)
POTASSIUM SERPL-SCNC: 3.7 MMOL/L (ref 3.4–5.3)
PROT SERPL-MCNC: 6.5 G/DL (ref 6.4–8.3)
RBC # BLD AUTO: 3.71 10E6/UL (ref 3.8–5.2)
RBC URINE: 3 /HPF
RSV RNA SPEC NAA+PROBE: NEGATIVE
SARS-COV-2 RNA RESP QL NAA+PROBE: NEGATIVE
SODIUM SERPL-SCNC: 136 MMOL/L (ref 135–145)
SP GR UR STRIP: 1.01 (ref 1–1.03)
UROBILINOGEN UR STRIP-MCNC: NORMAL MG/DL
WBC # BLD AUTO: 15.3 10E3/UL (ref 4–11)
WBC URINE: >182 /HPF

## 2024-12-10 PROCEDURE — 250N000012 HC RX MED GY IP 250 OP 636 PS 637: Performed by: INTERNAL MEDICINE

## 2024-12-10 PROCEDURE — 120N000001 HC R&B MED SURG/OB

## 2024-12-10 PROCEDURE — 250N000013 HC RX MED GY IP 250 OP 250 PS 637: Performed by: INTERNAL MEDICINE

## 2024-12-10 PROCEDURE — 96361 HYDRATE IV INFUSION ADD-ON: CPT

## 2024-12-10 PROCEDURE — 36415 COLL VENOUS BLD VENIPUNCTURE: CPT | Performed by: EMERGENCY MEDICINE

## 2024-12-10 PROCEDURE — 99223 1ST HOSP IP/OBS HIGH 75: CPT | Performed by: INTERNAL MEDICINE

## 2024-12-10 PROCEDURE — 83036 HEMOGLOBIN GLYCOSYLATED A1C: CPT | Performed by: INTERNAL MEDICINE

## 2024-12-10 PROCEDURE — 96375 TX/PRO/DX INJ NEW DRUG ADDON: CPT

## 2024-12-10 PROCEDURE — 87637 SARSCOV2&INF A&B&RSV AMP PRB: CPT | Performed by: INTERNAL MEDICINE

## 2024-12-10 PROCEDURE — 74176 CT ABD & PELVIS W/O CONTRAST: CPT | Mod: MG

## 2024-12-10 PROCEDURE — 36415 COLL VENOUS BLD VENIPUNCTURE: CPT | Performed by: INTERNAL MEDICINE

## 2024-12-10 PROCEDURE — 87088 URINE BACTERIA CULTURE: CPT | Performed by: EMERGENCY MEDICINE

## 2024-12-10 PROCEDURE — 258N000003 HC RX IP 258 OP 636: Performed by: INTERNAL MEDICINE

## 2024-12-10 PROCEDURE — 87086 URINE CULTURE/COLONY COUNT: CPT | Performed by: EMERGENCY MEDICINE

## 2024-12-10 PROCEDURE — 83690 ASSAY OF LIPASE: CPT | Performed by: EMERGENCY MEDICINE

## 2024-12-10 PROCEDURE — 87040 BLOOD CULTURE FOR BACTERIA: CPT | Performed by: EMERGENCY MEDICINE

## 2024-12-10 PROCEDURE — 80053 COMPREHEN METABOLIC PANEL: CPT | Performed by: EMERGENCY MEDICINE

## 2024-12-10 PROCEDURE — 250N000011 HC RX IP 250 OP 636: Performed by: EMERGENCY MEDICINE

## 2024-12-10 PROCEDURE — 258N000003 HC RX IP 258 OP 636: Performed by: EMERGENCY MEDICINE

## 2024-12-10 PROCEDURE — 96365 THER/PROPH/DIAG IV INF INIT: CPT | Mod: 59

## 2024-12-10 PROCEDURE — 81001 URINALYSIS AUTO W/SCOPE: CPT | Performed by: EMERGENCY MEDICINE

## 2024-12-10 PROCEDURE — 83605 ASSAY OF LACTIC ACID: CPT | Performed by: EMERGENCY MEDICINE

## 2024-12-10 PROCEDURE — 87186 SC STD MICRODIL/AGAR DIL: CPT | Performed by: EMERGENCY MEDICINE

## 2024-12-10 PROCEDURE — 99285 EMERGENCY DEPT VISIT HI MDM: CPT | Mod: 25

## 2024-12-10 PROCEDURE — 85004 AUTOMATED DIFF WBC COUNT: CPT | Performed by: EMERGENCY MEDICINE

## 2024-12-10 RX ORDER — NICOTINE POLACRILEX 4 MG
15-30 LOZENGE BUCCAL
Status: DISCONTINUED | OUTPATIENT
Start: 2024-12-10 | End: 2024-12-15 | Stop reason: HOSPADM

## 2024-12-10 RX ORDER — GABAPENTIN 250 MG/5ML
300 SOLUTION ORAL AT BEDTIME
Status: DISCONTINUED | OUTPATIENT
Start: 2024-12-10 | End: 2024-12-15 | Stop reason: HOSPADM

## 2024-12-10 RX ORDER — MEROPENEM 1 G/1
1 INJECTION, POWDER, FOR SOLUTION INTRAVENOUS EVERY 8 HOURS
Status: DISCONTINUED | OUTPATIENT
Start: 2024-12-11 | End: 2024-12-11

## 2024-12-10 RX ORDER — AMOXICILLIN 250 MG
1 CAPSULE ORAL 2 TIMES DAILY PRN
Status: DISCONTINUED | OUTPATIENT
Start: 2024-12-10 | End: 2024-12-15 | Stop reason: HOSPADM

## 2024-12-10 RX ORDER — PREDNISONE 10 MG/1
10 TABLET ORAL DAILY
Status: DISCONTINUED | OUTPATIENT
Start: 2024-12-11 | End: 2024-12-15 | Stop reason: HOSPADM

## 2024-12-10 RX ORDER — ONDANSETRON 2 MG/ML
4 INJECTION INTRAMUSCULAR; INTRAVENOUS EVERY 6 HOURS PRN
Status: DISCONTINUED | OUTPATIENT
Start: 2024-12-10 | End: 2024-12-15 | Stop reason: HOSPADM

## 2024-12-10 RX ORDER — BISACODYL 10 MG
10 SUPPOSITORY, RECTAL RECTAL DAILY PRN
Status: DISCONTINUED | OUTPATIENT
Start: 2024-12-10 | End: 2024-12-15 | Stop reason: HOSPADM

## 2024-12-10 RX ORDER — CALCIUM CARBONATE 500 MG/1
1000 TABLET, CHEWABLE ORAL 4 TIMES DAILY PRN
Status: DISCONTINUED | OUTPATIENT
Start: 2024-12-10 | End: 2024-12-15 | Stop reason: HOSPADM

## 2024-12-10 RX ORDER — EZETIMIBE 10 MG/1
10 TABLET ORAL DAILY
Status: DISCONTINUED | OUTPATIENT
Start: 2024-12-11 | End: 2024-12-15 | Stop reason: HOSPADM

## 2024-12-10 RX ORDER — FERROUS SULFATE 325(65) MG
325 TABLET ORAL DAILY
Status: DISCONTINUED | OUTPATIENT
Start: 2024-12-11 | End: 2024-12-15 | Stop reason: HOSPADM

## 2024-12-10 RX ORDER — LIDOCAINE 40 MG/G
CREAM TOPICAL
Status: DISCONTINUED | OUTPATIENT
Start: 2024-12-10 | End: 2024-12-15 | Stop reason: HOSPADM

## 2024-12-10 RX ORDER — FOLIC ACID 1 MG/1
1 TABLET ORAL DAILY
Status: DISCONTINUED | OUTPATIENT
Start: 2024-12-11 | End: 2024-12-15 | Stop reason: HOSPADM

## 2024-12-10 RX ORDER — DEXTROSE MONOHYDRATE 25 G/50ML
25-50 INJECTION, SOLUTION INTRAVENOUS
Status: DISCONTINUED | OUTPATIENT
Start: 2024-12-10 | End: 2024-12-15 | Stop reason: HOSPADM

## 2024-12-10 RX ORDER — PREDNISONE 10 MG/1
10 TABLET ORAL DAILY
Status: DISCONTINUED | OUTPATIENT
Start: 2024-12-11 | End: 2024-12-10

## 2024-12-10 RX ORDER — NALOXONE HYDROCHLORIDE 0.4 MG/ML
0.2 INJECTION, SOLUTION INTRAMUSCULAR; INTRAVENOUS; SUBCUTANEOUS
Status: DISCONTINUED | OUTPATIENT
Start: 2024-12-10 | End: 2024-12-15 | Stop reason: HOSPADM

## 2024-12-10 RX ORDER — MEROPENEM 500 MG/1
500 INJECTION, POWDER, FOR SOLUTION INTRAVENOUS ONCE
Status: COMPLETED | OUTPATIENT
Start: 2024-12-10 | End: 2024-12-10

## 2024-12-10 RX ORDER — ONDANSETRON 4 MG/1
4 TABLET, ORALLY DISINTEGRATING ORAL EVERY 6 HOURS PRN
Status: DISCONTINUED | OUTPATIENT
Start: 2024-12-10 | End: 2024-12-15 | Stop reason: HOSPADM

## 2024-12-10 RX ORDER — MECLIZINE HYDROCHLORIDE 25 MG/1
25 TABLET ORAL 3 TIMES DAILY PRN
Status: DISCONTINUED | OUTPATIENT
Start: 2024-12-10 | End: 2024-12-15 | Stop reason: HOSPADM

## 2024-12-10 RX ORDER — SIROLIMUS 0.5 MG/1
1 TABLET, FILM COATED ORAL DAILY
Status: DISCONTINUED | OUTPATIENT
Start: 2024-12-11 | End: 2024-12-15 | Stop reason: HOSPADM

## 2024-12-10 RX ORDER — HYDROMORPHONE HCL IN WATER/PF 6 MG/30 ML
0.2 PATIENT CONTROLLED ANALGESIA SYRINGE INTRAVENOUS
Status: COMPLETED | OUTPATIENT
Start: 2024-12-10 | End: 2024-12-10

## 2024-12-10 RX ORDER — CALCITRIOL 0.25 UG/1
0.25 CAPSULE, LIQUID FILLED ORAL DAILY
Status: DISCONTINUED | OUTPATIENT
Start: 2024-12-11 | End: 2024-12-15 | Stop reason: HOSPADM

## 2024-12-10 RX ORDER — NALOXONE HYDROCHLORIDE 0.4 MG/ML
0.4 INJECTION, SOLUTION INTRAMUSCULAR; INTRAVENOUS; SUBCUTANEOUS
Status: DISCONTINUED | OUTPATIENT
Start: 2024-12-10 | End: 2024-12-15 | Stop reason: HOSPADM

## 2024-12-10 RX ORDER — HYDROMORPHONE HYDROCHLORIDE 2 MG/1
2 TABLET ORAL EVERY 4 HOURS PRN
Status: DISCONTINUED | OUTPATIENT
Start: 2024-12-10 | End: 2024-12-15 | Stop reason: HOSPADM

## 2024-12-10 RX ORDER — URSODIOL 250 MG/1
250 TABLET, FILM COATED ORAL 2 TIMES DAILY
Status: DISCONTINUED | OUTPATIENT
Start: 2024-12-10 | End: 2024-12-15 | Stop reason: HOSPADM

## 2024-12-10 RX ORDER — HYDROMORPHONE HCL IN WATER/PF 6 MG/30 ML
0.4 PATIENT CONTROLLED ANALGESIA SYRINGE INTRAVENOUS
Status: DISCONTINUED | OUTPATIENT
Start: 2024-12-10 | End: 2024-12-15 | Stop reason: HOSPADM

## 2024-12-10 RX ORDER — AMOXICILLIN 250 MG
2 CAPSULE ORAL 2 TIMES DAILY PRN
Status: DISCONTINUED | OUTPATIENT
Start: 2024-12-10 | End: 2024-12-15 | Stop reason: HOSPADM

## 2024-12-10 RX ORDER — HYDROMORPHONE HCL IN WATER/PF 6 MG/30 ML
0.2 PATIENT CONTROLLED ANALGESIA SYRINGE INTRAVENOUS
Status: DISCONTINUED | OUTPATIENT
Start: 2024-12-10 | End: 2024-12-15 | Stop reason: HOSPADM

## 2024-12-10 RX ADMIN — HYDROMORPHONE HYDROCHLORIDE 0.2 MG: 0.2 INJECTION, SOLUTION INTRAMUSCULAR; INTRAVENOUS; SUBCUTANEOUS at 18:14

## 2024-12-10 RX ADMIN — MEROPENEM 500 MG: 500 INJECTION, POWDER, FOR SOLUTION INTRAVENOUS at 19:25

## 2024-12-10 RX ADMIN — GABAPENTIN 300 MG: 250 SUSPENSION ORAL at 23:16

## 2024-12-10 RX ADMIN — SODIUM CHLORIDE 1000 ML: 9 INJECTION, SOLUTION INTRAVENOUS at 21:05

## 2024-12-10 RX ADMIN — SODIUM CHLORIDE 500 ML: 9 INJECTION, SOLUTION INTRAVENOUS at 18:15

## 2024-12-10 RX ADMIN — SIROLIMUS 1 MG: 0.5 TABLET ORAL at 23:17

## 2024-12-10 RX ADMIN — URSODIOL 250 MG: 250 TABLET ORAL at 23:16

## 2024-12-10 RX ADMIN — APIXABAN 2.5 MG: 2.5 TABLET, FILM COATED ORAL at 23:16

## 2024-12-10 ASSESSMENT — ACTIVITIES OF DAILY LIVING (ADL)
ADLS_ACUITY_SCORE: 55
ADLS_ACUITY_SCORE: 59

## 2024-12-10 ASSESSMENT — COLUMBIA-SUICIDE SEVERITY RATING SCALE - C-SSRS
2. HAVE YOU ACTUALLY HAD ANY THOUGHTS OF KILLING YOURSELF IN THE PAST MONTH?: NO
6. HAVE YOU EVER DONE ANYTHING, STARTED TO DO ANYTHING, OR PREPARED TO DO ANYTHING TO END YOUR LIFE?: NO
1. IN THE PAST MONTH, HAVE YOU WISHED YOU WERE DEAD OR WISHED YOU COULD GO TO SLEEP AND NOT WAKE UP?: NO

## 2024-12-10 NOTE — ED PROVIDER NOTES
Emergency Department Note      History of Present Illness     Chief Complaint   Abdominal Pain and Groin Pain      HPI   Luz Thompson is a 74 year old female on Eliquis for atrial fibrillation with a history of chronic kidney disease, cerebrovascular accident, peripheral artery disease, hyperlipidemia, hypertension, PAD, Sjogren's, sepsis, and type II diabetes presenting to the ED with concerns for feeling sick in general, low-grade temperature elevation which she reports is a fever, chills, and left groin pain radiating into her lower left quadrant.  She believes her symptoms are due to UTI, noting cloudy urine with urinary frequency.  The pain however is new for her, atypical for past UTIs.  The patient reports she was seen by an infectious disease specialist at the  and was prescribed antibiotics which she finished yesterday not clear per patient report why she was placed on the fosfomycin.  Her temperature was 99.5 at home which was high for her.  She started having flank pain last night, worsening when she stands up. She thought she was constipated, but has had several bowel movements without relief. The patient denies black or bloody stool. Of note, the patient had a liver transplant in 2003; prednisone is part of her transplant regime. She does not have an appendix or a gallbladder. She cannot have IV contrast due to her chronic kidney disease.     Independent Historian   Daughter as noted above.    Review of External Notes   I reviewed outpatient dermatology note from yesterday.    Past Medical History     Medical History and Problem List   DERREK  Anemia  Anisometropia  Anxiety  Astigmatism  Atrial fibrillation  Biliary cirrhosis  Cataract  Cellulitis  CKD stage III  Coccidioidomycosis   CVA  Diabetes mellitus type II  Diabetic nephropathy  Diverticulosis  DVT  EBV  Eczema   GERD  Glaucoma  HLD  HTN  Lung infection  Macular  "degeneration  MDD  Migraine  RASHIDA  Osteoarthritis  Osteomyelitis  Osteoporosis  PAD  Presbyopia  Sepsis   Sjogren's syndrome  Thyroid cancer    Medications   apixaban  evolocumab  ezetimibe  furosemide  gabapentin  levothyroxine  linagliptin   losartan  meclizine  metoprolol succinate  prednisone  sirolimus   ursodiol       Surgical History   Appendectomy  Cataract extraction & IOL implant B/L  Cholecystectomy  PICC insertion  Thyroidectomy  Transplant liver  Tympanoplasty  Varicose vein removal    Physical Exam     Patient Vitals for the past 24 hrs:   BP Temp Temp src Pulse Resp SpO2 Height Weight   12/10/24 2042 115/50 99.6  F (37.6  C) Oral 91 20 96 % -- --   12/10/24 1427 116/83 98.7  F (37.1  C) Oral 102 20 100 % 1.715 m (5' 7.5\") 79.4 kg (175 lb)     Physical Exam  General: Elderly adult laying on the stretcher  Eyes: PERRL, Conjunctive within normal limits.  No scleral icterus.  ENT: Moist mucous membranes, oropharynx clear.   CV: Normal S1S2.  Possible systolic murmur noted.  Regular rate and rhythm  Resp: Clear to auscultation bilaterally, no wheezes, rales or rhonchi. Normal respiratory effort.  GI: Abdomen is soft and nondistended.  Diffuse tenderness even to light touch.  No palpable masses. No rebound or guarding.  MSK: No pitting edema. Nontender. Normal active range of motion.  Skin: Warm and dry.  No jaundice.  No erythema.  Neuro: Alert and oriented. Responds appropriately to all questions and commands.   Psych: This appearing.    Diagnostics     Lab Results   Labs Ordered and Resulted from Time of ED Arrival to Time of ED Departure   COMPREHENSIVE METABOLIC PANEL - Abnormal       Result Value    Sodium 136      Potassium 3.7      Carbon Dioxide (CO2) 17 (*)     Anion Gap 18 (*)     Urea Nitrogen 44.5 (*)     Creatinine 1.75 (*)     GFR Estimate 30 (*)     Calcium 9.4      Chloride 101      Glucose 121 (*)     Alkaline Phosphatase 101      AST 32      ALT 30      Protein Total 6.5      Albumin 3.6   "    Bilirubin Total 0.6     ROUTINE UA WITH MICROSCOPIC REFLEX TO CULTURE - Abnormal    Color Urine Yellow      Appearance Urine Cloudy (*)     Glucose Urine Negative      Bilirubin Urine Negative      Ketones Urine Negative      Specific Gravity Urine 1.014      Blood Urine Negative      pH Urine 5.5      Protein Albumin Urine 50 (*)     Urobilinogen Urine Normal      Nitrite Urine Negative      Leukocyte Esterase Urine Large (*)     Bacteria Urine Few (*)     RBC Urine 3 (*)     WBC Urine >182 (*)    CBC WITH PLATELETS AND DIFFERENTIAL - Abnormal    WBC Count 15.3 (*)     RBC Count 3.71 (*)     Hemoglobin 10.8 (*)     Hematocrit 32.9 (*)     MCV 89      MCH 29.1      MCHC 32.8      RDW 16.2 (*)     Platelet Count 335      % Neutrophils 78      % Lymphocytes 14      % Monocytes 6      % Eosinophils 1      % Basophils 0      % Immature Granulocytes 1      NRBCs per 100 WBC 0      Absolute Neutrophils 11.9 (*)     Absolute Lymphocytes 2.2      Absolute Monocytes 0.9      Absolute Eosinophils 0.1      Absolute Basophils 0.0      Absolute Immature Granulocytes 0.2      Absolute NRBCs 0.0     LACTIC ACID WHOLE BLOOD - Normal    Lactic Acid 1.8     LIPASE - Normal    Lipase 33     BLOOD CULTURE   BLOOD CULTURE   URINE CULTURE       Imaging   CT Abdomen Pelvis w/o Contrast   Final Result   IMPRESSION:    1.  Left colonic diverticula. Small amount of fluid adjacent to the sigmoid colon possibly secondary to acute diverticulitis. No additional inflammatory changes or evidence for obstruction.   2.  Constipation.   3.  Liver transplant.    4.  Calcified pleural plaques right lung likely related to prior asbestos exposure. Small groundglass opacities right lower lobe, of indeterminate time frame, possibly chronic.   5.  Tiny hyperdense lesions left kidney too small to further characterize, but could represent proteinaceous or hemorrhagic cysts.                Independent Interpretation   None    ED Course      Medications  Administered   Medications   sodium chloride 0.9% BOLUS 500 mL (500 mLs Intravenous $New Bag 12/10/24 1815)   HYDROmorphone (DILAUDID) injection 0.2 mg (0.2 mg Intravenous $Given 12/10/24 1814)   meropenem (MERREM) 500 mg vial to attach to  mL bag for ADULTS or 25 mL bag for PEDS (500 mg Intravenous $New Bag 12/10/24 1925)     Discussion of Management   Admitting Hospitalist, Dr. Anthony who accepts the patient to his service    ED Course   ED Course as of 12/10/24 1700   Tue Dec 10, 2024   1700 I obtained the history and examined the patient as noted above.     I reassessed the patient.  She appears more comfortable.  She notes her pain has improved.  She denies any new concerns.  I discussed findings and plan which she was agreeable to.  All questions were answered prior to admission.    Additional Documentation  None    Medical Decision Making / Diagnosis     [facility]   Luz Thompson is a 74 year old female with a history notable for liver transplant, recurrent UTI, diabetes mellitus, chronic kidney disease, accessible atrial fibrillation amongst other medical issues who presents emergency department with concerns for flank pain and urinary symptoms.  She is afebrile here.  She is not toxic in appearance.  Her abdomen was mildly tender diffusely with barely any pressure on the abdomen but with some amount of anxiety, patient reporting it was more prominent in the left flank.  She does have evidence of urinary tract infection.  CT without contrast without clear cause for her pain, suspect pyelonephritis given this clinical presentation.  Slight diverticular inflammation unlikely to be causing her the symptoms she has, but diverticulitis considered.  She has multiple antibiotic and medication allergies.  Fluids were administered, and urine culture sent, antibiotic administered-meropenem given her multiple allergies and history of ESBL.  She will be admitted for ongoing care.  At this time no evidence of  septic shock.  Kidney function at baseline.    Disposition   The patient was admitted to the hospital.     Diagnosis     ICD-10-CM    1. Pyelonephritis, acute  N10       2. Sepsis without acute organ dysfunction, due to unspecified organism (H)  A41.9       3. Left flank pain  R10.9       4. Generalized abdominal pain  R10.84       5. Immunocompromised (H)  D84.9       6. H/O liver transplant (H)  Z94.4            Scribe Disclosure:  I, Alisa Larios, am serving as a scribe at 4:59 PM on 12/10/2024 to document services personally performed by Surekha Falcon MD based on my observations and the provider's statements to me.        Surekha Falcon MD  12/10/24 2121

## 2024-12-10 NOTE — ED TRIAGE NOTES
Pt presents for evaluation of feeling sick, fever, chills, side pain. Hx of sepsis and frequent UTIs. C/o left groin pain that radiates in to LLQ. Associated nausea and urinary frequency. Just finished abx yesterday for a UTI.

## 2024-12-10 NOTE — ED TRIAGE NOTES
Pt presents via EMS from an independent senior living facility for concern of a UTI. Pt has a significant history of UTIs and is followed by MD Lundberg at [facility] infectious disease. Pt additionally complains of left flank and left abdominal pain. Pt states she is unhappy about being at the [facility] ED and had requested to go to [facility]. Pt tearful about being brought here. ABC intact, A&Ox4. Pt's daughter enroute.

## 2024-12-10 NOTE — LETTER
Connie FINN, [facility]  Inpatient Care Coordination  Emergency Room   [phone]

## 2024-12-11 LAB
ALBUMIN SERPL BCG-MCNC: 3.1 G/DL (ref 3.5–5.2)
ALP SERPL-CCNC: 114 U/L (ref 40–150)
ALT SERPL W P-5'-P-CCNC: 143 U/L (ref 0–50)
ANION GAP SERPL CALCULATED.3IONS-SCNC: 15 MMOL/L (ref 7–15)
AST SERPL W P-5'-P-CCNC: 132 U/L (ref 0–45)
BILIRUB SERPL-MCNC: 0.7 MG/DL
BUN SERPL-MCNC: 41.4 MG/DL (ref 8–23)
CALCIUM SERPL-MCNC: 8.1 MG/DL (ref 8.8–10.4)
CHLORIDE SERPL-SCNC: 108 MMOL/L (ref 98–107)
CREAT SERPL-MCNC: 1.76 MG/DL (ref 0.51–0.95)
EGFRCR SERPLBLD CKD-EPI 2021: 30 ML/MIN/1.73M2
ERYTHROCYTE [DISTWIDTH] IN BLOOD BY AUTOMATED COUNT: 16.3 % (ref 10–15)
GLUCOSE BLDC GLUCOMTR-MCNC: 147 MG/DL (ref 70–99)
GLUCOSE BLDC GLUCOMTR-MCNC: 151 MG/DL (ref 70–99)
GLUCOSE BLDC GLUCOMTR-MCNC: 157 MG/DL (ref 70–99)
GLUCOSE BLDC GLUCOMTR-MCNC: 190 MG/DL (ref 70–99)
GLUCOSE SERPL-MCNC: 171 MG/DL (ref 70–99)
HCO3 SERPL-SCNC: 17 MMOL/L (ref 22–29)
HCT VFR BLD AUTO: 32 % (ref 35–47)
HGB BLD-MCNC: 10.2 G/DL (ref 11.7–15.7)
MCH RBC QN AUTO: 29 PG (ref 26.5–33)
MCHC RBC AUTO-ENTMCNC: 31.9 G/DL (ref 31.5–36.5)
MCV RBC AUTO: 91 FL (ref 78–100)
PLATELET # BLD AUTO: 317 10E3/UL (ref 150–450)
POTASSIUM SERPL-SCNC: 4.2 MMOL/L (ref 3.4–5.3)
PROT SERPL-MCNC: 5.5 G/DL (ref 6.4–8.3)
RBC # BLD AUTO: 3.52 10E6/UL (ref 3.8–5.2)
SODIUM SERPL-SCNC: 140 MMOL/L (ref 135–145)
WBC # BLD AUTO: 12.7 10E3/UL (ref 4–11)

## 2024-12-11 PROCEDURE — 85018 HEMOGLOBIN: CPT | Performed by: INTERNAL MEDICINE

## 2024-12-11 PROCEDURE — 250N000011 HC RX IP 250 OP 636: Performed by: INTERNAL MEDICINE

## 2024-12-11 PROCEDURE — 36415 COLL VENOUS BLD VENIPUNCTURE: CPT | Performed by: INTERNAL MEDICINE

## 2024-12-11 PROCEDURE — 120N000001 HC R&B MED SURG/OB

## 2024-12-11 PROCEDURE — 99232 SBSQ HOSP IP/OBS MODERATE 35: CPT | Performed by: STUDENT IN AN ORGANIZED HEALTH CARE EDUCATION/TRAINING PROGRAM

## 2024-12-11 PROCEDURE — 250N000013 HC RX MED GY IP 250 OP 250 PS 637: Performed by: STUDENT IN AN ORGANIZED HEALTH CARE EDUCATION/TRAINING PROGRAM

## 2024-12-11 PROCEDURE — 85041 AUTOMATED RBC COUNT: CPT | Performed by: INTERNAL MEDICINE

## 2024-12-11 PROCEDURE — 99222 1ST HOSP IP/OBS MODERATE 55: CPT | Mod: FS | Performed by: STUDENT IN AN ORGANIZED HEALTH CARE EDUCATION/TRAINING PROGRAM

## 2024-12-11 PROCEDURE — 80053 COMPREHEN METABOLIC PANEL: CPT | Performed by: INTERNAL MEDICINE

## 2024-12-11 PROCEDURE — 250N000012 HC RX MED GY IP 250 OP 636 PS 637: Performed by: INTERNAL MEDICINE

## 2024-12-11 PROCEDURE — 250N000013 HC RX MED GY IP 250 OP 250 PS 637: Performed by: INTERNAL MEDICINE

## 2024-12-11 RX ORDER — METOPROLOL SUCCINATE 25 MG/1
25 TABLET, EXTENDED RELEASE ORAL 2 TIMES DAILY
Status: DISCONTINUED | OUTPATIENT
Start: 2024-12-11 | End: 2024-12-15 | Stop reason: HOSPADM

## 2024-12-11 RX ORDER — MEROPENEM 1 G/1
1 INJECTION, POWDER, FOR SOLUTION INTRAVENOUS EVERY 12 HOURS
Status: DISCONTINUED | OUTPATIENT
Start: 2024-12-11 | End: 2024-12-13

## 2024-12-11 RX ORDER — GUAR GUM
1 PACKET (EA) ORAL DAILY
Status: DISCONTINUED | OUTPATIENT
Start: 2024-12-11 | End: 2024-12-11 | Stop reason: ALTCHOICE

## 2024-12-11 RX ADMIN — Medication 1 PACKET: at 10:22

## 2024-12-11 RX ADMIN — SIROLIMUS 1 MG: 0.5 TABLET ORAL at 11:50

## 2024-12-11 RX ADMIN — APIXABAN 2.5 MG: 2.5 TABLET, FILM COATED ORAL at 20:02

## 2024-12-11 RX ADMIN — URSODIOL 250 MG: 250 TABLET ORAL at 20:02

## 2024-12-11 RX ADMIN — APIXABAN 2.5 MG: 2.5 TABLET, FILM COATED ORAL at 09:05

## 2024-12-11 RX ADMIN — CALCITRIOL CAPSULES 0.25 MCG 0.25 MCG: 0.25 CAPSULE ORAL at 09:05

## 2024-12-11 RX ADMIN — GABAPENTIN 300 MG: 250 SUSPENSION ORAL at 20:02

## 2024-12-11 RX ADMIN — URSODIOL 250 MG: 250 TABLET ORAL at 11:50

## 2024-12-11 RX ADMIN — EZETIMIBE 10 MG: 10 TABLET ORAL at 09:06

## 2024-12-11 RX ADMIN — MEROPENEM 1 G: 1 INJECTION, POWDER, FOR SOLUTION INTRAVENOUS at 15:56

## 2024-12-11 RX ADMIN — FERROUS SULFATE TAB 325 MG (65 MG ELEMENTAL FE) 325 MG: 325 (65 FE) TAB at 10:25

## 2024-12-11 RX ADMIN — METOPROLOL SUCCINATE 25 MG: 25 TABLET, EXTENDED RELEASE ORAL at 20:02

## 2024-12-11 RX ADMIN — METOPROLOL SUCCINATE 25 MG: 25 TABLET, EXTENDED RELEASE ORAL at 10:25

## 2024-12-11 RX ADMIN — PREDNISONE 10 MG: 10 TABLET ORAL at 09:19

## 2024-12-11 RX ADMIN — FOLIC ACID 1 MG: 1 TABLET ORAL at 09:05

## 2024-12-11 RX ADMIN — PREDNISONE 10 MG: 10 TABLET ORAL at 00:15

## 2024-12-11 RX ADMIN — MEROPENEM 1 G: 1 INJECTION, POWDER, FOR SOLUTION INTRAVENOUS at 04:18

## 2024-12-11 RX ADMIN — SITAGLIPTIN 25 MG: 25 TABLET, FILM COATED ORAL at 13:28

## 2024-12-11 RX ADMIN — LEVOTHYROXINE SODIUM 175 MCG: 0.12 TABLET ORAL at 09:05

## 2024-12-11 ASSESSMENT — ACTIVITIES OF DAILY LIVING (ADL)
ADLS_ACUITY_SCORE: 55
ADLS_ACUITY_SCORE: 54
ADLS_ACUITY_SCORE: 55
ADLS_ACUITY_SCORE: 54
ADLS_ACUITY_SCORE: 55
ADLS_ACUITY_SCORE: 54
ADLS_ACUITY_SCORE: 55
ADLS_ACUITY_SCORE: 54
ADLS_ACUITY_SCORE: 55
ADLS_ACUITY_SCORE: 54
ADLS_ACUITY_SCORE: 55
ADLS_ACUITY_SCORE: 54

## 2024-12-11 NOTE — PLAN OF CARE
"Pt arrived to unit~2200.  Pt refused to ambulate to bed- used slide board to transfer.  Skin with some red areas- see note & flowsheets. C/O left  flank and groin pain, declined meds. BILL shins red, dry, flaky- pt reports hx of cellulitis, will monitor. HS - box lunch provided.  Overnight . Lungs clear/dim. +soft BM via bedpan. Saline locked following bolus. IV merrem. On tele- SR per tele tech. Low grade fever- tmax 100.1. Blood and urine cx pending. Resp panel neg. Contact iso for ESBL.     Major Shift Events   +BM, Resp panel neg. Uneventful night after  admission completed. Home meds resumed, slept well. Daughter was included in orientation to room, discussion of bill of rights & Advanced directives.     Treatment Plan:   IV merrem, glucose monitoring, skin monitoring, monitor fevers. Symptom management. Urine and blood cx pending. Maintain isolation.      Addendum: Pt was unable to void overnight and scanned for 515. Straight cathed for 550 mL, mostly cloudy urine but small amount of green purulent drainage also came out from aj. MD notified                      Problem: Adult Inpatient Plan of Care  Goal: Plan of Care Review  Description: The Plan of Care Review/Shift note should be completed every shift.  The Outcome Evaluation is a brief statement about your assessment that the patient is improving, declining, or no change.  This information will be displayed automatically on your shift  note.  Outcome: Progressing  Flowsheets (Taken 12/11/2024 0606)  Plan of Care Reviewed With: patient  Overall Patient Progress: no change  Goal: Patient-Specific Goal (Individualized)  Description: You can add care plan individualizations to a care plan. Examples of Individualization might be:  \"Parent requests to be called daily at 9am for status\", \"I have a hard time hearing out of my right ear\", or \"Do not touch me to wake me up as it startles  me\".  Outcome: Progressing  Goal: Absence of " Hospital-Acquired Illness or Injury  Outcome: Progressing  Intervention: Identify and Manage Fall Risk  Recent Flowsheet Documentation  Taken 12/10/2024 2215 by Suzan Mejia, RN  Safety Promotion/Fall Prevention: safety round/check completed  Intervention: Prevent Skin Injury  Recent Flowsheet Documentation  Taken 12/10/2024 2215 by Suzan Mejia, RN  Body Position: turned  Taken 12/10/2024 2042 by Suzan Mejia, RN  Body Position: position changed independently  Intervention: Prevent Infection  Recent Flowsheet Documentation  Taken 12/10/2024 2215 by Suzan Mejia, RN  Infection Prevention: rest/sleep promoted  Goal: Optimal Comfort and Wellbeing  Outcome: Progressing  Goal: Readiness for Transition of Care  Outcome: Progressing  Intervention: Mutually Develop Transition Plan  Recent Flowsheet Documentation  Taken 12/10/2024 2044 by Suzan Mejia, RN  Equipment Currently Used at Home:   cane, straight   shower chair   walker, rolling   Goal Outcome Evaluation:      Plan of Care Reviewed With: patient    Overall Patient Progress: no changeOverall Patient Progress: no change

## 2024-12-11 NOTE — H&P
[facility]    History and Physical - Hospitalist Service       Date of Admission:  12/10/2024    Assessment & Plan      Luz Thompson is a 74 year old female admitted on 12/10/2024. 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  She presented to emergency room with fevers, chills, dysuria, and left flank pain.  Found to have sepsis due to urinary tract infection with suspected pyelonephritis.    Sepsis due to UTI.  Urinary tract infection.  Suspected pyelonephritis.  -Multiple antibiotic allergies.  -History of ESBL, VRE.  -Has tolerated meropenem in the past.  -Complains of very localized pain in the left flank.    -Not having any pain in the rest of the abdomen.  -Was a small amount of fluid noted on CT scan next to sigmoid colon.  -Diverticulitis quite a bit less likely than pyelonephritis.  -Continue meropenem 1 g IV every 8 hours.  -Give additional 1 L normal saline IV fluid bolus over 1 hour now.  -diverticulitis would be covered by meropenem.  -Allow regular diet.    Primary biliary cirrhosis.  Previous liver transplant.  -Plan to restart immunosuppression once verified by pharmacy.    Chronic kidney disease stage IIIb.  -Creatinine near baseline.  -Avoid nephrotoxins as able.  -Recheck metabolic panel tomorrow.    Atrial fibrillation.  Drug-induced coagulation defect.  Hypertension.  -Restart apixaban 2.5 mg twice a day.  -Hold metoprolol, hydralazine, furosemide initially due to mildly low blood pressures.    Diabetes mellitus type 2.  -Aspart insulin sliding scale as needed.    Hypothyroidism.  -Restart levothyroxine  "once verified by pharmacy.              Diet: Combination Diet Regular Diet Adult    DVT Prophylaxis: DOAC  Ron Catheter: Not present  Lines: None     Cardiac Monitoring: ACTIVE order. Indication: Tachyarrhythmias, acute (48 hours)  Code Status: Full Code      Clinically Significant Risk Factors Present on Admission                # Drug Induced Coagulation Defect: home medication list includes an anticoagulant medication    # Hypertension: Noted on problem list      # Anemia: based on hgb <11      # DMII: A1C = N/A within past 6 months    # Overweight: Estimated body mass index is 27 kg/m  as calculated from the following:    Height as of this encounter: 1.715 m (5' 7.5\").    Weight as of this encounter: 79.4 kg (175 lb).              Disposition Plan     Medically Ready for Discharge: Anticipated in 2-4 Days           Amrit Anthony DO  Hospitalist Service  [facility]  Securely message with [employer] (more info)  Text page via [employer] Paging/Directory     ______________________________________________________________________    Chief Complaint   Fevers, chills, left flank pain.    History is obtained from the patient    History of Present Illness   Luz Thompson is a 74 year old female who has a past medical history significant for primary biliary cirrhosis status post previous liver transplant, chronic anemia, chronic kidney disease, anxiety, previous stroke, previous DVT, atrial fibrillation, diverticulosis, glaucoma, hypertension, hyperlipidemia, recurrent urinary tract infections, migraines, osteoarthritis, hypothyroidism, thyroid cancer, Sjogren's syndrome, and diabetes mellitus type 2.  She began having left flank pain last night.  Also having some mild burning sensation at the end of urinating.  Today, developed intermittent fevers and chills.  Continued to have left flank pain.  Continues to have burning urination.  Presented to emergency room for further evaluation.  Has had " "some nausea.  Occasional shortness of breath.  No cough.  No chest pain.  No diarrhea.      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       Past Surgical History   Past Surgical History:   Procedure Laterality Date    APPENDECTOMY  1961    CATARACT IOL, RT/LT      RE12/19/2013, LE12/10/2013 - Toric lenses    CHOLECYSTECTOMY  1991    COLONOSCOPY  03/10/2014    Procedure: COLONOSCOPY;;  Surgeon: Gentry Ramirez MD;  Location:  GI    CYSTOSCOPY      ear drum repair      ENDOBRONCHIAL ULTRASOUND FLEXIBLE N/A 09/29/2017    Procedure: ENDOBRONCHIAL ULTRASOUND FLEXIBLE;  Flexible Bronchoscopy, " Endobronchial Ultrasound, Transbronchial Needle Aspiration ;  Surgeon: Eden Clinton MD;  Location: [facility] OR    ENDOSCOPIC RETROGRADE CHOLANGIOPANCREATOGRAM  09/19/2013    Procedure: ENDOSCOPIC RETROGRADE CHOLANGIOPANCREATOGRAM;  Endoscopic Retrograde Cholangiopancreatogram with single balloon enteroscopy, ballon sweep of bile duct;  Surgeon: Brett Membreno MD;  Location: [facility] OR    HC KNEE SCOPE,MED/LAT MENISECTOMY Right 08/10/2012    partial medial menisectomy only    KNEE SURGERY  1966    R knee    PICC INSERTION  09/18/2013    4fr SL PASV PICC, 40cm (1cm external) in the R basilic vein w/ tip in the low SVC    PICC INSERTION  02/21/2014    5 fr DL BioFlo Navilyst PICC, 46 cm (3 cm external) in the L basilic vein w/ tip in the SVC RA junction.    THYROIDECTOMY  03/2010    TRANSPLANT LIVER RECIPIENT LIVING UNRELATED  05/2002       Prior to Admission Medications   Prior to Admission Medications   Prescriptions Last Dose Informant Patient Reported? Taking?   D-MANNOSE PO   Yes No   Sig: Take 1 Scoop by mouth 2 times daily.   Ferrous Sulfate 324 MG TBEC   Yes No   Sig: Take 1 tablet by mouth 2 times daily.   Multiple Vitamins-Minerals (PRESERVISION AREDS 2) CAPS   No No   Sig: Take 1 capsule by mouth 2 times daily   Nutrisource Fiber PO packet   Yes No   Sig: Take 1 packet by mouth daily.   apixaban ANTICOAGULANT (ELIQUIS ANTICOAGULANT) 2.5 MG tablet   No No   Sig: Take 1 tablet (2.5 mg) by mouth 2 times daily   azelastine (ASTELIN) 0.1 % nasal spray   Yes No   Sig: Spray 1 spray into both nostrils as needed for allergies or rhinitis.   calcitRIOL (ROCALTROL) 0.25 MCG capsule   No No   Sig: Take 1 capsule (0.25 mcg) by mouth daily.   estradiol (ESTRACE) 0.1 MG/GM vaginal cream   Yes No   Sig: Place vaginally every other day.   evolocumab (REPATHA SURECLICK) 140 MG/ML prefilled autoinjector   No No   Sig: Inject 1 mL (140 mg) subcutaneously every 14 days. . Please have fasting labs drawn for further refills.    ezetimibe (ZETIA) 10 MG tablet   No No   Sig: Take 1 tablet (10 mg) by mouth daily.   folic acid (FOLVITE) 1 MG tablet   No No   Sig: Take 1 tablet (1 mg) by mouth daily.   fosfomycin (MONUROL) 3 g Packet   No No   Sig: Take 1 packet (3 g) by mouth every other day.   fosfomycin (MONUROL) 3 g Packet   No No   Sig: Every other day for 3 doses, dispense 3 packs   fosfomycin (MONUROL) 3 g Packet   No No   Sig: Take 1 packet (3 g) by mouth every other day for 5 doses.   furosemide (LASIX) 20 MG tablet   No No   Sig: Take 1 tablet (20 mg) by mouth daily   gabapentin (NEURONTIN) 250 MG/5ML solution   No No   Sig: Take 6 mLs (300 mg) by mouth at bedtime   glucose (BD GLUCOSE) 5 g chewable tablet   No No   Sig: Take 2 tablets (10 g) by mouth as needed (low blood sugar)   hydrALAZINE (APRESOLINE) 50 MG tablet   No No   Sig: Take 1 tablet (50 mg) by mouth 3 times daily.   hypromellose (ARTIFICIAL TEARS) 0.4 % SOLN ophthalmic solution   No No   Sig: Apply 1 drop to eye every hour as needed for dry eyes   ketoconazole (NIZORAL) 2 % external cream   No No   Sig: Apply topically 2 times daily as needed (redness and flaking). Apply to the face.   ketoconazole (NIZORAL) 2 % external shampoo   No No   Sig: Apply topically three times a week. Lather in the shower, wait 3-5 minutes before rinsing.   levothyroxine (SYNTHROID/LEVOTHROID) 175 MCG tablet   No No   Sig: Take 1 tablet (175 mcg) by mouth daily.   lidocaine (LMX4) 4 % external cream   No No   Sig: Apply topically once for 1 dose. Apply to lesion on the right neck one hour before dermatology appointment.   linagliptin (TRADJENTA) 5 MG TABS tablet   No No   Sig: Take 1 tablet (5 mg) by mouth daily   meclizine (ANTIVERT) 25 MG tablet   No No   Sig: Take 1 tablet (25 mg) by mouth as needed for dizziness or other (migraines)   metoprolol succinate ER (TOPROL XL) 25 MG 24 hr tablet   Yes No   Sig: Take 1 tablet (25 mg) by mouth 2 times daily. Take an extra tablet daily as needed  for breakthrough afib. May repeat in two hours if heart rate remains >100bpm (no more than 4 tablets per day).   omega-3 acid ethyl esters (LOVAZA) 1 g capsule   No No   Sig: Take 1 capsule by mouth 2 times daily   predniSONE (DELTASONE) 10 MG tablet   No No   Sig: Take 1 tablet (10 mg) by mouth daily.   sirolimus (GENERIC EQUIVALENT) 1 MG tablet   No No   Sig: Take 1 tablet (1 mg) by mouth daily.   ursodiol (ACTIGALL) 250 MG tablet   No No   Sig: Take 1 tablet (250 mg) by mouth 2 times daily.      Facility-Administered Medications: None        Allergies   Allergies   Allergen Reactions    Fluconazole Hives and Itching     Full body hives      Mycophenolate Diarrhea and Nausea and Vomiting     Patient stated it was chronic and lasted months      Penicillins Anaphylaxis, Hives, Itching and Rash     PEN-FAST - Penicillin Allergy Risk Tool completed by [facility] December 10, 2024  Score: 3  Risk: Moderate  Assessment: Patient has a 20 % chance of a positive penicillin allergy test         Simvastatin Muscle Pain (Myalgia)     severe  Other reaction(s): Myalgia caused by statin    Methotrexate Other (See Comments)     Other reaction(s): Sore  Sores in mouth, esophagus, and stomach.       Morphine And Codeine Itching and Other (See Comments)     Psych disturbance  Other reaction(s): Confusion, Mood alteration    Quinolones Anxiety, Dizziness, Headache, Other (See Comments), Palpitations and Unknown     Other reaction(s): Hyperactive behavior, Lightheadedness, Mood alteration    Dizzy, light headed    Dizziness, shaky, and jumpy    Bactrim [Sulfamethoxazole-Trimethoprim]     Benadryl [Diphenhydramine Hcl]      Insomnia     Capsules, Empty Gelatin [Gelatin]     Cephalosporins Itching    Ciprofloxacin Hcl Dizziness and Other (See Comments)    Lansoprazole Diarrhea    Azithromycin Itching    Doxycycline Itching and Unknown    Lisinopril Cough    Omeprazole Itching    Tolectin [Nsaids] Rash    Tolmetin Rash and Itching    Tramadol  Rash, Hives and Itching        Physical Exam   Vital Signs: Temp: 98.7  F (37.1  C) Temp src: Oral BP: 116/83 Pulse: 102   Resp: 20 SpO2: 100 % O2 Device: None (Room air)    Weight: 175 lbs 0 oz    Gen:  NAD, A&Ox3.  Eyes:  PERRL, sclera anicteric.  OP:  MMM, no lesions.  Neck:  Supple.  CV: Mildly irregular, no loud murmurs.  Lung:  CTA b/l, normal effort.  Ab:  +BS, soft.  Skin:  Warm, dry to touch.  Mild erythema bilateral shins.  Ext:  Mild non pitting edema LE b/l.      Medical Decision Making       85 MINUTES SPENT BY ME on the date of service doing chart review, history, exam, documentation & further activities per the note.      Data     I have personally reviewed the following data over the past 24 hrs:    15.3 (H)  \   10.8 (L)   / 335     136 101 44.5 (H) /  121 (H)   3.7 17 (L) 1.75 (H) \     ALT: 30 AST: 32 AP: 101 TBILI: 0.6   ALB: 3.6 TOT PROTEIN: 6.5 LIPASE: 33     Procal: N/A CRP: N/A Lactic Acid: 1.8         Imaging results reviewed over the past 24 hrs:   Recent Results (from the past 24 hours)   CT Abdomen Pelvis w/o Contrast    Narrative    EXAM: CT ABDOMEN PELVIS W/O CONTRAST  LOCATION: [facility]  DATE: 12/10/2024    INDICATION: abd pain, greatest on the left  COMPARISON: CT abdomen and pelvis 08/27/2019  TECHNIQUE: CT scan of the abdomen and pelvis was performed without IV contrast. Multiplanar reformats were obtained. Dose reduction techniques were used.  CONTRAST: None.    FINDINGS:   LOWER CHEST: Calcified pleural plaques in calcified granulomas right lower lobe. Small groundglass opacities right lower lobe.    HEPATOBILIARY: Liver transplant.    PANCREAS: Atrophic.    SPLEEN: Normal.    ADRENAL GLANDS: Normal.    KIDNEYS/BLADDER: A few small hyperdense lesions left renal cortex, too small to further characterize. No stones or hydronephrosis.,.    BOWEL: Diverticula throughout the colon, most pronounced within the sigmoid colon. There is a small amount of free pelvic  fluid adjacent to the posterior sigmoid colon. No evidence for obstruction. Moderate amount of stool throughout the colon to the   level of the rectum.    LYMPH NODES: Normal.    VASCULATURE: Normal.    PELVIC ORGANS: Hysterectomy. Trace free pelvic fluid.    MUSCULOSKELETAL: Osteopenia. Degenerative disc disease lower thoracic and lower lumbar spine. Several small fat-containing ventral wall hernias.      Impression    IMPRESSION:   1.  Left colonic diverticula. Small amount of fluid adjacent to the sigmoid colon possibly secondary to acute diverticulitis. No additional inflammatory changes or evidence for obstruction.  2.  Constipation.  3.  Liver transplant.   4.  Calcified pleural plaques right lung likely related to prior asbestos exposure. Small groundglass opacities right lower lobe, of indeterminate time frame, possibly chronic.  5.  Tiny hyperdense lesions left kidney too small to further characterize, but could represent proteinaceous or hemorrhagic cysts.

## 2024-12-11 NOTE — ED NOTES
[facility]  ED Nurse Handoff Report    ED Chief complaint: Abdominal Pain and Groin Pain  . ED Diagnosis:   Final diagnoses:   Pyelonephritis, acute   Sepsis without acute organ dysfunction, due to unspecified organism (H)   Left flank pain   Generalized abdominal pain   Immunocompromised (H)   H/O liver transplant (H)       Allergies:   Allergies   Allergen Reactions    Fluconazole Hives and Itching     Full body hives      Mycophenolate Diarrhea and Nausea and Vomiting     Patient stated it was chronic and lasted months      Penicillins Rash, Anaphylaxis, Hives and Itching    Simvastatin Muscle Pain (Myalgia)     severe  Other reaction(s): Myalgia caused by statin    Methotrexate Other (See Comments)     Other reaction(s): Sore  Sores in mouth, esophagus, and stomach.       Morphine And Codeine Itching and Other (See Comments)     Psych disturbance  Other reaction(s): Confusion, Mood alteration    Quinolones Anxiety, Dizziness, Headache, Other (See Comments), Palpitations and Unknown     Other reaction(s): Hyperactive behavior, Lightheadedness, Mood alteration    Dizzy, light headed    Dizziness, shaky, and jumpy    Bactrim [Sulfamethoxazole-Trimethoprim]     Benadryl [Diphenhydramine Hcl]      Insomnia     Capsules, Empty Gelatin [Gelatin]     Cephalosporins Itching    Ciprofloxacin Hcl Dizziness and Other (See Comments)    Lansoprazole Diarrhea    Azithromycin Itching    Doxycycline Itching and Unknown    Lisinopril Cough    Omeprazole Itching    Tolectin [Nsaids] Rash    Tolmetin Rash and Itching    Tramadol Rash, Hives and Itching       Code Status: Full Code    Activity level - Baseline/Home:  assist of 1.  Activity Level - Current:   in bed.   Lift room needed: No.   Bariatric: No   Needed: No   Isolation: No.   Infection: Not Applicable  ESBL.     Respiratory status: Room air    Vital Signs (within 30 minutes):   Vitals:    12/10/24 1427   BP: 116/83   Pulse: 102   Resp: 20  "  Temp: 98.7  F (37.1  C)   TempSrc: Oral   SpO2: 100%   Weight: 79.4 kg (175 lb)   Height: 1.715 m (5' 7.5\")       Cardiac Rhythm:  ,      Pain level:    Patient confused: No.   Patient Falls Risk: nonskid shoes/slippers when out of bed and patient and family education.   Elimination Status:  Straight cath      Patient Report - Initial Complaint: Abd and groin pain.   Focused Assessment: Pt c/o pain in abd and groin, burning with urination. Pt unable to void with purewick, straight cath used.    Abnormal Results:   Labs Ordered and Resulted from Time of ED Arrival to Time of ED Departure   COMPREHENSIVE METABOLIC PANEL - Abnormal       Result Value    Sodium 136      Potassium 3.7      Carbon Dioxide (CO2) 17 (*)     Anion Gap 18 (*)     Urea Nitrogen 44.5 (*)     Creatinine 1.75 (*)     GFR Estimate 30 (*)     Calcium 9.4      Chloride 101      Glucose 121 (*)     Alkaline Phosphatase 101      AST 32      ALT 30      Protein Total 6.5      Albumin 3.6      Bilirubin Total 0.6     ROUTINE UA WITH MICROSCOPIC REFLEX TO CULTURE - Abnormal    Color Urine Yellow      Appearance Urine Cloudy (*)     Glucose Urine Negative      Bilirubin Urine Negative      Ketones Urine Negative      Specific Gravity Urine 1.014      Blood Urine Negative      pH Urine 5.5      Protein Albumin Urine 50 (*)     Urobilinogen Urine Normal      Nitrite Urine Negative      Leukocyte Esterase Urine Large (*)     Bacteria Urine Few (*)     RBC Urine 3 (*)     WBC Urine >182 (*)    CBC WITH PLATELETS AND DIFFERENTIAL - Abnormal    WBC Count 15.3 (*)     RBC Count 3.71 (*)     Hemoglobin 10.8 (*)     Hematocrit 32.9 (*)     MCV 89      MCH 29.1      MCHC 32.8      RDW 16.2 (*)     Platelet Count 335      % Neutrophils 78      % Lymphocytes 14      % Monocytes 6      % Eosinophils 1      % Basophils 0      % Immature Granulocytes 1      NRBCs per 100 WBC 0      Absolute Neutrophils 11.9 (*)     Absolute Lymphocytes 2.2      Absolute Monocytes 0.9  "     Absolute Eosinophils 0.1      Absolute Basophils 0.0      Absolute Immature Granulocytes 0.2      Absolute NRBCs 0.0     LACTIC ACID WHOLE BLOOD - Normal    Lactic Acid 1.8     LIPASE - Normal    Lipase 33     BLOOD CULTURE   BLOOD CULTURE   URINE CULTURE        CT Abdomen Pelvis w/o Contrast   Final Result   IMPRESSION:    1.  Left colonic diverticula. Small amount of fluid adjacent to the sigmoid colon possibly secondary to acute diverticulitis. No additional inflammatory changes or evidence for obstruction.   2.  Constipation.   3.  Liver transplant.    4.  Calcified pleural plaques right lung likely related to prior asbestos exposure. Small groundglass opacities right lower lobe, of indeterminate time frame, possibly chronic.   5.  Tiny hyperdense lesions left kidney too small to further characterize, but could represent proteinaceous or hemorrhagic cysts.                Treatments provided: Per POC  Family Comments: With daughter  OBS brochure/video discussed/provided to patient:  No  ED Medications:   Medications   sodium chloride 0.9% BOLUS 500 mL (500 mLs Intravenous $New Bag 12/10/24 1815)   HYDROmorphone (DILAUDID) injection 0.2 mg (0.2 mg Intravenous $Given 12/10/24 1814)   meropenem (MERREM) 500 mg vial to attach to  mL bag for ADULTS or 25 mL bag for PEDS (500 mg Intravenous $New Bag 12/10/24 1925)       Drips infusing:  Yes  For the majority of the shift this patient was Green.   Interventions performed were See MAR.    Sepsis treatment initiated: No    Cares/treatment/interventions/medications to be completed following ED care: Pain and abx    ED Nurse Name: Concepcion Ramon RN  7:57 PM       RECEIVING UNIT ED HANDOFF REVIEW    Above ED Nurse Handoff Report was reviewed: Yes  Reviewed by: Suzan Mejia RN on December 10, 2024 at 8:15 PM   ALAN Soni called the ED to inform them the note was read: No

## 2024-12-11 NOTE — PROGRESS NOTES
12/10/24 5093   Skin   Skin WDL X;color   Skin Color redness blanchable   Redness blanchable location Sacrum/Coccyx;Shin;Calf  (BILL  LE red, dry,flaky, coccyx blanchable,  small red area on leftcalf)         Admission/Transfer from: ER  2 RN skin assessment completed. Yes  Significant findings include: BILL LE dry, flaky & red, red circular area on left calf, red coccyx  blanchable but intact. Slits  of open area  to BILL groin, tender.  LDA added (if applicable)? NO  Requested WOC Nurse Consult from MD? NO

## 2024-12-11 NOTE — PROGRESS NOTES
[facility]    Medicine Progress Note - Hospitalist Service    Date of Admission:  12/10/2024    Assessment & Plan   Luz Thompson is a 74 year old female admitted on 12/10/2024. 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  She presented to emergency room with fevers, chills, dysuria, and left flank pain.     She was hemodynamically stable on arrival.  WBC count of 15.3 with UA showing more than 182 WBCs and large leukocyte esterase with negative nitrite.  He tested negative for COVID-19, influenza A/B and RSV.    CT abdomen showed left colonic diverticula with small amount of fluid adjacent to the sigmoid colon possibly secondary to diverticulitis.     Is not having urinary retention and needing straight cath, this morning had some greenish pus coming out with straight cath.      Sepsis due to UTI, suspected pyelonephritis.  Given history of ESBL and VRE, started on meropenem.  Follow-up on blood cultures.  Given pus on straight cath, urology was consulted.  No bladder abscess was seen on CT.  Urinary retention, straight cath as needed.      Diverticulosis with diverticulitis.  CT showed left colonic diverticula with small amount of fluid adjacent to the sigmoid colon, secondary to acute diverticulitis.  Meropenem should cover for diverticulitis.  Patient did not have any air in the bladder to suggest colovesical fistula.    Chronic medical conditions  Primary biliary cirrhosis: History of previous liver transplant.  Continue ursodiol, prednisone 10 mg daily and sirolimus.  CKD stage IIIb: Continue at baseline.  Atrial  "fibrillation: Resume metoprolol and apixaban.  Drug-induced coagulation defect.  Essential hypertension: Hydralazine and Lasix held due to low blood pressure.  Diabetes mellitus type 2: On sliding scale insulin.  Hypothyroidism: Continue Synthroid.  Hyperlipidemia.  On ezetimibe and Repatha    Family communication.  Discussed with patient at bedside.    Disposition  Will likely discharge home in next 2 to 3 days.            Diet: Combination Diet Regular Diet Adult    DVT Prophylaxis: DOAC  Ron Catheter: Not present  Lines: None     Cardiac Monitoring: ACTIVE order. Indication: Tachyarrhythmias, acute (48 hours)  Code Status: Full Code      Clinically Significant Risk Factors Present on Admission          # Hyperchloremia: Highest Cl = 108 mmol/L in last 2 days, will monitor as appropriate          # Hypoalbuminemia: Lowest albumin = 3.1 g/dL at 12/11/2024  7:55 AM, will monitor as appropriate  # Drug Induced Coagulation Defect: home medication list includes an anticoagulant medication    # Hypertension: Noted on problem list      # Anemia: based on hgb <11      # DMII: A1C = 6.9 % (Ref range: <5.7 %) within past 6 months    # Overweight: Estimated body mass index is 27 kg/m  as calculated from the following:    Height as of this encounter: 1.715 m (5' 7.5\").    Weight as of this encounter: 79.4 kg (175 lb).              Social Drivers of Health    Housing Stability: Low Risk  (12/11/2024)    Housing Stability     Do you have housing? : Yes     Are you worried about losing your housing?: No   Recent Concern: Housing Stability - High Risk (10/12/2024)    Housing Stability     Do you have housing? : No     Are you worried about losing your housing?: No   Tobacco Use: Medium Risk (12/9/2024)    Patient History     Smoking Tobacco Use: Former     Smokeless Tobacco Use: Never   Physical Activity: Insufficiently Active (11/9/2023)    Received from [employer]    Exercise Vital Sign     Days of Exercise per Week: 5 days "     Minutes of Exercise per Session: 10 min          Disposition Plan     Medically Ready for Discharge: Anticipated in 2-4 Days             Luis Miguel Nielson MD  Hospitalist Service  [facility]  Securely message with [employer] (more info)  Text page via [employer] Paging/Directory   ______________________________________________________________________    Interval History     Continues to urinary retention.    Physical Exam   Vital Signs: Temp: 98.4  F (36.9  C) Temp src: Axillary BP: 127/57 Pulse: 84   Resp: 20 SpO2: 95 % O2 Device: None (Room air)    Weight: 175 lbs 0 oz    General Appearance: Alert awake and oriented x 3  Respiratory: Clear to auscultation  Cardiovascular: S1-S2 normal  GI: Soft.  Left flank tenderness.  No suprapubic tenderness.  Skin: No rash  Other: No edema      Medical Decision Making       MANAGEMENT DISCUSSED with the following over the past 24 hours: Patient and RN       Data     I have personally reviewed the following data over the past 24 hrs:    12.7 (H)  \   10.2 (L)   / 317     140 108 (H) 41.4 (H) /  151 (H)   4.2 17 (L) 1.76 (H) \     ALT: 143 (H) AST: 132 (H) AP: 114 TBILI: 0.7   ALB: 3.1 (L) TOT PROTEIN: 5.5 (L) LIPASE: 33     TSH: N/A T4: N/A A1C: 6.9 (H)     Procal: N/A CRP: N/A Lactic Acid: 1.8         Imaging results reviewed over the past 24 hrs:   Recent Results (from the past 24 hours)   CT Abdomen Pelvis w/o Contrast    Narrative    EXAM: CT ABDOMEN PELVIS W/O CONTRAST  LOCATION: [facility]  DATE: 12/10/2024    INDICATION: abd pain, greatest on the left  COMPARISON: CT abdomen and pelvis 08/27/2019  TECHNIQUE: CT scan of the abdomen and pelvis was performed without IV contrast. Multiplanar reformats were obtained. Dose reduction techniques were used.  CONTRAST: None.    FINDINGS:   LOWER CHEST: Calcified pleural plaques in calcified granulomas right lower lobe. Small groundglass opacities right lower lobe.    HEPATOBILIARY: Liver  transplant.    PANCREAS: Atrophic.    SPLEEN: Normal.    ADRENAL GLANDS: Normal.    KIDNEYS/BLADDER: A few small hyperdense lesions left renal cortex, too small to further characterize. No stones or hydronephrosis.,.    BOWEL: Diverticula throughout the colon, most pronounced within the sigmoid colon. There is a small amount of free pelvic fluid adjacent to the posterior sigmoid colon. No evidence for obstruction. Moderate amount of stool throughout the colon to the   level of the rectum.    LYMPH NODES: Normal.    VASCULATURE: Normal.    PELVIC ORGANS: Hysterectomy. Trace free pelvic fluid.    MUSCULOSKELETAL: Osteopenia. Degenerative disc disease lower thoracic and lower lumbar spine. Several small fat-containing ventral wall hernias.      Impression    IMPRESSION:   1.  Left colonic diverticula. Small amount of fluid adjacent to the sigmoid colon possibly secondary to acute diverticulitis. No additional inflammatory changes or evidence for obstruction.  2.  Constipation.  3.  Liver transplant.   4.  Calcified pleural plaques right lung likely related to prior asbestos exposure. Small groundglass opacities right lower lobe, of indeterminate time frame, possibly chronic.  5.  Tiny hyperdense lesions left kidney too small to further characterize, but could represent proteinaceous or hemorrhagic cysts.

## 2024-12-11 NOTE — PHARMACY-ADMISSION MEDICATION HISTORY
Pharmacist Admission Medication History    Admission medication history is complete. The information provided in this note is only as accurate as the sources available at the time of the update.    Information Source(s): Patient, Family member, and CareEverywhere/[employer]Script[employer] via in-person    Pertinent Information: none    Changes made to PTA medication list:  Added: None  Deleted: fosfomycin  Changed: ferrous sulf    Allergies reviewed with patient and updates made in EHR: yes    Medication History Completed By: Yandel Charles RPH 12/10/2024 8:53 PM    PTA Med List   Medication Sig Last Dose/Taking    apixaban ANTICOAGULANT (ELIQUIS ANTICOAGULANT) 2.5 MG tablet Take 1 tablet (2.5 mg) by mouth 2 times daily 12/9/2024    azelastine (ASTELIN) 0.1 % nasal spray Spray 1 spray into both nostrils as needed for allergies or rhinitis. Taking As Needed    calcitRIOL (ROCALTROL) 0.25 MCG capsule Take 1 capsule (0.25 mcg) by mouth daily. 12/9/2024 Morning    D-MANNOSE PO Take 1 Scoop by mouth 2 times daily. 12/9/2024    estradiol (ESTRACE) 0.1 MG/GM vaginal cream Place vaginally every other day. 12/8/2024 Morning    evolocumab (REPATHA SURECLICK) 140 MG/ML prefilled autoinjector Inject 1 mL (140 mg) subcutaneously every 14 days. . Please have fasting labs drawn for further refills. 11/25/2024 Morning    ezetimibe (ZETIA) 10 MG tablet Take 1 tablet (10 mg) by mouth daily. 12/9/2024 Evening    Ferrous Sulfate 324 MG TBEC Take 1 tablet by mouth daily. 12/9/2024 Morning    folic acid (FOLVITE) 1 MG tablet Take 1 tablet (1 mg) by mouth daily. Past Week Morning    gabapentin (NEURONTIN) 250 MG/5ML solution Take 6 mLs (300 mg) by mouth at bedtime 12/9/2024 Evening    hydrALAZINE (APRESOLINE) 50 MG tablet Take 1 tablet (50 mg) by mouth 3 times daily. 12/10/2024 Morning    hypromellose (ARTIFICIAL TEARS) 0.4 % SOLN ophthalmic solution Apply 1 drop to eye every hour as needed for dry eyes Taking As Needed    levothyroxine  (SYNTHROID/LEVOTHROID) 175 MCG tablet Take 1 tablet (175 mcg) by mouth daily. 12/9/2024 Morning    linagliptin (TRADJENTA) 5 MG TABS tablet Take 1 tablet (5 mg) by mouth daily 12/9/2024 Morning    meclizine (ANTIVERT) 25 MG tablet Take 1 tablet (25 mg) by mouth as needed for dizziness or other (migraines) Taking As Needed    metoprolol succinate ER (TOPROL XL) 25 MG 24 hr tablet Take 1 tablet (25 mg) by mouth 2 times daily. Take an extra tablet daily as needed for breakthrough afib. May repeat in two hours if heart rate remains >100bpm (no more than 4 tablets per day). 12/10/2024 Morning    Multiple Vitamins-Minerals (PRESERVISION AREDS 2) CAPS Take 1 capsule by mouth 2 times daily 12/9/2024    Nutrisource Fiber PO packet Take 1 packet by mouth daily. 12/9/2024    omega-3 acid ethyl esters (LOVAZA) 1 g capsule Take 1 capsule by mouth 2 times daily 12/9/2024    predniSONE (DELTASONE) 10 MG tablet Take 1 tablet (10 mg) by mouth daily. 12/9/2024 Morning    sirolimus (GENERIC EQUIVALENT) 1 MG tablet Take 1 tablet (1 mg) by mouth daily. 12/9/2024 Morning    ursodiol (ACTIGALL) 250 MG tablet Take 1 tablet (250 mg) by mouth 2 times daily. 12/9/2024

## 2024-12-11 NOTE — CONSULTS
[facility] Consultation by [facility]    Luz Thompson MRN# [id number]   Age: 74 year old YOB: 1950     Date of Admission:  12/10/2024    Reason for consult: Greenish Pus came out with straight cath        Requesting PA/MD: Dr. Nielson       Level of consult: Consult, follow and place orders           Assessment and Plan:   Assessment:   Recurrent UTI  Concern for green pus with intermittent catheterization  Sepsis with concern for pyelonephritis  Urinary retention  CKD stage III  Atrial fibrillation  Hypertension  History of liver transplant  Diabetes mellitus type 2      Plan:   -CT imaging did not show any specific concerns for abscess.    -If she continues to require catheterization again, would be more inclined to have a temporary indwelling Ron catheter placed to allow for maximum decompression as well as ability to possibly irrigate.  Order has been placed for this.  -There is a possibility that given her recurrent infections and presentation of possible straight catheterization that she possibly has a finding such as urethral diverticulum.  This may be better evaluated on cystoscopy or confirmed with MRI of the pelvis.  Neither of these are urgent.  -Continue with IV antibiotics.  Follow cultures.  Transition to culture specific as available and oral if available and when clinically appropriate.  -Follow-up with infectious disease as scheduled as well as with Dr. Gutierrez as scheduled in January.  -If clinical decline, would consider additional pelvic imaging.  Will hold off at this time.  -Recommend bowel regimen given stool burden seen on CT imaging and patient's issues with retention.  Constipation can contribute to retention as well as recurrent infections.  -Will continue to follow along.    Cherie Mujica PA-C   [facility]  [phone]               Chief Complaint:   Abdominal Pain and Groin Pain      History is obtained from the patient and EMR.         History of  Present Illness:   This patient is a 74 year old female Eliquis for atrial fibrillation with a history of chronic kidney disease, recurrent UTIs, cerebrovascular accident, peripheral artery disease, hyperlipidemia, hypertension, PAD, Sjogren's, sepsis, and type II diabetes presenting to the ED yesterday with concerns for feeling sick in general, low-grade temperature elevation which she reports is a fever, chills, and left groin pain radiating into her lower left quadrant.    Patient has been noting cloudy urine with urinary frequency.  She is also endorsing abdominal pain.  She recently met with infectious disease specialist at [facility].  Her antibiotics were completed within the last several days.  She also noted that she felt constipated.  Patient has a history of liver transplant 2003 and is on immune suppression.  She also has diabetes.    Patient met with my colleague, Edwina Riddle PA-C on 08/20/24.  She is scheduled for a cystoscopy with Dr. Ko in late January 2025.  She previously was seen with urology in [place].  She previously saw Dr. Waddell in 2019.    Patient endorsed fevers, chills, and urinary frequency.  History of ESBL infections.  Tmax 100.9.  WBC 12.7 (15.3).  Creatinine 1.76 EGFR of 30.    Urology was consulted as patient was having difficulties with voiding.  She underwent straight catheterization for a bladder scan of 550 mL.  Straight catheterization was 550 mL and there was cloudy urine with green pus.  There was concern given the pus about possible abscess.  CT imaging did not show any evidence of abscess.  PureWick is currently in place with clear viktoria urine in the canister.  CT imaging did show quite a bit of constipation with a fairly large fecal ball in the rectum.  Patient notes that she was able to urinate a little on her own.  She also endorses having a bowel movement this morning.  She denies flank pain, but does endorse some nausea.    Blood cultures have no growth to date.  Urine  "culture currently shows greater than 100,000 CFU per mL of Klebsiella pneumonia.  She is on IV meropenem.          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 Gentry Ramirez, [facility]      Lung infection 11/24/2023    Macular degeneration     Migraines 04/04/2012    Mumps, history of     Nonsenile cataract     Osteoarthritis of right knee 08/02/2012    Osteoporosis 04/20/2012    Paroxysmal atrial fibrillation 06/13/2017    Postablative hypothyroidism 08/13/2012    Primary biliary cirrhosis (H)     s/p Liver transplant, [id number]    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             Past Surgical History:     Past Surgical History:   Procedure Laterality Date    APPENDECTOMY  1961    CATARACT IOL, RT/LT      RE12/19/2013, LE12/10/2013 - Toric lenses    CHOLECYSTECTOMY  1991    COLONOSCOPY  03/10/2014    Procedure: COLONOSCOPY;;  Surgeon: Gentry Ramirez MD;  Location:  GI    CYSTOSCOPY      ear drum repair      ENDOBRONCHIAL ULTRASOUND FLEXIBLE N/A 09/29/2017    Procedure: ENDOBRONCHIAL ULTRASOUND FLEXIBLE;  " "Flexible Bronchoscopy, Endobronchial Ultrasound, Transbronchial Needle Aspiration ;  Surgeon: Eden Clinton MD;  Location: [facility] OR    ENDOSCOPIC RETROGRADE CHOLANGIOPANCREATOGRAM  2013    Procedure: ENDOSCOPIC RETROGRADE CHOLANGIOPANCREATOGRAM;  Endoscopic Retrograde Cholangiopancreatogram with single balloon enteroscopy, ballon sweep of bile duct;  Surgeon: Brett Membreno MD;  Location: [facility] OR    HC KNEE SCOPE,MED/LAT MENISECTOMY Right 08/10/2012    partial medial menisectomy only    KNEE SURGERY  1966    R knee    PICC INSERTION  2013    4fr SL PASV PICC, 40cm (1cm external) in the R basilic vein w/ tip in the low SVC    PICC INSERTION  2014    5 fr DL BioFlo Navilyst PICC, 46 cm (3 cm external) in the L basilic vein w/ tip in the SVC RA junction.    THYROIDECTOMY  2010    TRANSPLANT LIVER RECIPIENT LIVING UNRELATED  2002             Social History:     Social History     Tobacco Use    Smoking status: Former     Current packs/day: 0.00     Average packs/day: 1 pack/day for 18.0 years (18.0 ttl pk-yrs)     Types: Cigarettes     Start date: 1967     Quit date: 1985     Years since quittin.6    Smokeless tobacco: Never   Substance Use Topics    Alcohol use: Yes     Alcohol/week: 0.0 standard drinks of alcohol     Comment: rare - \"I toast at weddings\"             Family History:     Family History   Problem Relation Age of Onset    Hypertension Mother     Endometrial Cancer Mother     Hyperlipidemia Mother     Prostate Cancer Father     Macular Degeneration Father     Cancer - colorectal Maternal Grandmother         in her 80's, has surgery and removal of part of kidney,  at age 98    Heart Disease Maternal Grandfather          at 98    Glaucoma Maternal Grandfather     Cerebrovascular Disease Paternal Grandmother         in her 80's    Hypertension Paternal Grandmother     Heart Disease Paternal Grandfather         MI    Alzheimer Disease Paternal Grandfather     " Allergies Son     Neurologic Disorder Daughter         Migraines    Breast Cancer Other     Anesthesia Reaction No family hx of     Crohn's Disease No family hx of     Ulcerative Colitis No family hx of     Melanoma No family hx of     Skin Cancer No family hx of      Family history reviewed.             Allergies:     Allergies   Allergen Reactions    Fluconazole Hives and Itching     Full body hives      Mycophenolate Diarrhea and Nausea and Vomiting     Patient stated it was chronic and lasted months      Penicillins Anaphylaxis, Hives, Itching and Rash     PEN-FAST - Penicillin Allergy Risk Tool completed by [facility] December 10, 2024  Score: 3  Risk: Moderate  Assessment: Patient has a 20 % chance of a positive penicillin allergy test         Simvastatin Muscle Pain (Myalgia)     severe  Other reaction(s): Myalgia caused by statin    Methotrexate Other (See Comments)     Other reaction(s): Sore  Sores in mouth, esophagus, and stomach.       Morphine And Codeine Itching and Other (See Comments)     Psych disturbance  Other reaction(s): Confusion, Mood alteration    Quinolones Anxiety, Dizziness, Headache, Other (See Comments), Palpitations and Unknown     Other reaction(s): Hyperactive behavior, Lightheadedness, Mood alteration    Dizzy, light headed    Dizziness, shaky, and jumpy    Bactrim [Sulfamethoxazole-Trimethoprim]     Benadryl [Diphenhydramine Hcl]      Insomnia     Capsules, Empty Gelatin [Gelatin]     Cephalosporins Itching    Ciprofloxacin Hcl Dizziness and Other (See Comments)    Lansoprazole Diarrhea    Azithromycin Itching    Doxycycline Itching and Unknown    Lisinopril Cough    Omeprazole Itching    Tolectin [Nsaids] Rash    Tolmetin Rash and Itching    Tramadol Rash, Hives and Itching             Medications:     Current Facility-Administered Medications   Medication Dose Route Frequency Provider Last Rate Last Admin    apixaban ANTICOAGULANT (ELIQUIS) tablet 2.5 mg  2.5 mg Oral BID Raj  Amrit MCGINNIS DO   2.5 mg at 12/11/24 0905    Benefiber On The GO PACK 1 packet [Patient supplied]  1 packet Oral Daily Luis Miguel Nielson MD   1 packet at 12/11/24 1022    bisacodyl (DULCOLAX) suppository 10 mg  10 mg Rectal Daily PRN RickleAmrit luciano DO        calcitRIOL (ROCALTROL) capsule 0.25 mcg  0.25 mcg Oral Daily RicklefsAmrit DO   0.25 mcg at 12/11/24 0905    calcium carbonate (TUMS) chewable tablet 1,000 mg  1,000 mg Oral 4x Daily PRN RickleAmrit luciano DO        glucose gel 15-30 g  15-30 g Oral Q15 Min PRN RickleAmrit luciano DO        Or    dextrose 50 % injection 25-50 mL  25-50 mL Intravenous Q15 Min PRN RickleAmrit luciano DO        Or    glucagon injection 1 mg  1 mg Subcutaneous Q15 Min PRN RicklefsAmrit DO        evolocumab (REPATHA) injection 140 mg  140 mg Subcutaneous Q14 Days Luis Miguel Nielson MD        ezetimibe (ZETIA) tablet 10 mg  10 mg Oral Daily RickleAmrit luciano DO   10 mg at 12/11/24 0906    ferrous sulfate (FEROSUL) tablet 325 mg  325 mg Oral Daily RickleAmrit luciano DO   325 mg at 12/11/24 1025    folic acid (FOLVITE) tablet 1 mg  1 mg Oral Daily RickleAmrit luciano DO   1 mg at 12/11/24 0905    gabapentin (NEURONTIN) solution 300 mg  300 mg Oral At Bedtime RickleAmrit luciano DO   300 mg at 12/10/24 2316    HYDROmorphone (DILAUDID) half-tab 1 mg  1 mg Oral Q4H PRN RickleAmrit luciano DO        HYDROmorphone (DILAUDID) injection 0.2 mg  0.2 mg Intravenous Q2H PRN RickleAmrit luciano DO        HYDROmorphone (DILAUDID) injection 0.4 mg  0.4 mg Intravenous Q2H PRN RickleAmrit luciano DO        HYDROmorphone (DILAUDID) tablet 2 mg  2 mg Oral Q4H PRN RickAmrit lawler, DO        insulin aspart (NovoLOG) injection (RAPID ACTING)  1-3 Units Subcutaneous TID AC Amrit Anthony, DO        insulin aspart (NovoLOG) injection (RAPID ACTING)  1-3 Units Subcutaneous At Bedtime Amrit Anthony, DO        levothyroxine (SYNTHROID/LEVOTHROID) tablet 175 mcg  175 mcg  Oral Daily Amrit Anthony DO   175 mcg at 12/11/24 0905    lidocaine (LMX4) cream   Topical Q1H PRN Amrit Anthony DO        lidocaine 1 % 0.1-1 mL  0.1-1 mL Other Q1H PRN Amrit Anthony DO        meclizine (ANTIVERT) tablet 25 mg  25 mg Oral TID PRN Amrit Anthony DO        melatonin tablet 1 mg  1 mg Oral At Bedtime PRN Amrit Anthony DO        meropenem (MERREM) 1 g vial to attach to  mL bag  1 g Intravenous Q12H Amrit Anthony DO        metoprolol succinate ER (TOPROL XL) 24 hr tablet 25 mg  25 mg Oral BID Luis Miguel Nielson MD   25 mg at 12/11/24 1025    naloxone (NARCAN) injection 0.2 mg  0.2 mg Intravenous Q2 Min PRN Amrit Anthony DO        Or    naloxone (NARCAN) injection 0.4 mg  0.4 mg Intravenous Q2 Min PRN Amrit Anthony DO        Or    naloxone (NARCAN) injection 0.2 mg  0.2 mg Intramuscular Q2 Min PRN Amrit Anthony DO        Or    naloxone (NARCAN) injection 0.4 mg  0.4 mg Intramuscular Q2 Min PRN Amrit Anthony DO        ondansetron (ZOFRAN ODT) ODT tab 4 mg  4 mg Oral Q6H PRN Amrit Anthony DO        Or    ondansetron (ZOFRAN) injection 4 mg  4 mg Intravenous Q6H PRN Amrit Anthony DO        Patient is already receiving anticoagulation with heparin, enoxaparin (LOVENOX), warfarin (COUMADIN)  or other anticoagulant medication   Does not apply Continuous PRN Amrit Anthony DO        predniSONE (DELTASONE) tablet 10 mg  10 mg Oral Daily Amrit Anthony DO   10 mg at 12/11/24 0919    senna-docusate (SENOKOT-S/PERICOLACE) 8.6-50 MG per tablet 1 tablet  1 tablet Oral BID PRN Amrit Anthony DO        Or    senna-docusate (SENOKOT-S/PERICOLACE) 8.6-50 MG per tablet 2 tablet  2 tablet Oral BID PRN Amrit Anthony,         sirolimus (GENERIC EQUIVALENT) tablet 1 mg  1 mg Oral Daily Amrit Anthony DO   1 mg at 12/11/24 1150    sitagliptin (JANUVIA) tablet 25 mg  25 mg Oral Daily Amrit Anthony, DO   25  "mg at 12/11/24 1328    sodium chloride (PF) 0.9% PF flush 3 mL  3 mL Intracatheter Q8H Amrit Anthony D, DO   3 mL at 12/11/24 0906    sodium chloride (PF) 0.9% PF flush 3 mL  3 mL Intracatheter q1 min prn Amrit Anthony D, DO        ursodiol (ACTIGALL) tablet 250 mg  250 mg Oral BID Amrit Anthony D, DO   250 mg at 12/11/24 1150             Review of Systems:   A comprehensive 10-point review of systems was performed and found to be negative except as described in the HPI.     /57 (BP Location: Right arm)   Pulse 90   Temp 98.5  F (36.9  C) (Axillary)   Resp 20   Ht 1.715 m (5' 7.5\")   Wt 79.4 kg (175 lb)   LMP 06/01/1988 (Approximate)   SpO2 95%   Breastfeeding No   BMI 27.00 kg/m    PSYCH: NAD  EYES: EOMI  MOUTH: MMM  NECK: Supple, no notable adenopathy  RESP: Unlabored breathing  CARDIAC: Regular rate by radial pulse, irregular rhythm  SKIN: Warm, no rashes  ABD: soft, Nontender  NEURO: AAO x3  URO: Purewick in place with clear viktoria urine         Data:     Lab Results   Component Value Date    WBC 12.7 (H) 12/11/2024    HGB 10.2 (L) 12/11/2024    HCT 32.0 (L) 12/11/2024    MCV 91 12/11/2024     12/11/2024     Lab Results   Component Value Date    CR 1.76 (H) 12/11/2024    CR 1.75 (H) 12/10/2024     Recent Labs   Lab 12/10/24  1739   COLOR Yellow   APPEARANCE Cloudy*   URINEGLC Negative   URINEBILI Negative   URINEKETONE Negative   SG 1.014   URINEPH 5.5   PROTEIN 50*   NITRITE Negative   LEUKEST Large*   RBCU 3*   WBCU >182*     All cultures:  Recent Labs   Lab 12/10/24  1919 12/10/24  1807 12/10/24  1739   CULTURE No growth after 12 hours No growth after 12 hours >100,000 CFU/mL Klebsiella pneumoniae*      CT Abdomen Pelvis w/o Contrast    Result Date: 12/10/2024  EXAM: CT ABDOMEN PELVIS W/O CONTRAST LOCATION: [facility] DATE: 12/10/2024 INDICATION: abd pain, greatest on the left COMPARISON: CT abdomen and pelvis 08/27/2019 TECHNIQUE: CT scan of the abdomen " and pelvis was performed without IV contrast. Multiplanar reformats were obtained. Dose reduction techniques were used. CONTRAST: None. FINDINGS: LOWER CHEST: Calcified pleural plaques in calcified granulomas right lower lobe. Small groundglass opacities right lower lobe. HEPATOBILIARY: Liver transplant. PANCREAS: Atrophic. SPLEEN: Normal. ADRENAL GLANDS: Normal. KIDNEYS/BLADDER: A few small hyperdense lesions left renal cortex, too small to further characterize. No stones or hydronephrosis.,. BOWEL: Diverticula throughout the colon, most pronounced within the sigmoid colon. There is a small amount of free pelvic fluid adjacent to the posterior sigmoid colon. No evidence for obstruction. Moderate amount of stool throughout the colon to the level of the rectum. LYMPH NODES: Normal. VASCULATURE: Normal. PELVIC ORGANS: Hysterectomy. Trace free pelvic fluid. MUSCULOSKELETAL: Osteopenia. Degenerative disc disease lower thoracic and lower lumbar spine. Several small fat-containing ventral wall hernias.     IMPRESSION: 1.  Left colonic diverticula. Small amount of fluid adjacent to the sigmoid colon possibly secondary to acute diverticulitis. No additional inflammatory changes or evidence for obstruction. 2.  Constipation. 3.  Liver transplant. 4.  Calcified pleural plaques right lung likely related to prior asbestos exposure. Small groundglass opacities right lower lobe, of indeterminate time frame, possibly chronic. 5.  Tiny hyperdense lesions left kidney too small to further characterize, but could represent proteinaceous or hemorrhagic cysts.

## 2024-12-12 ENCOUNTER — APPOINTMENT (OUTPATIENT)
Dept: PHYSICAL THERAPY | Facility: CLINIC | Age: 74
DRG: 872 | End: 2024-12-12
Attending: STUDENT IN AN ORGANIZED HEALTH CARE EDUCATION/TRAINING PROGRAM
Payer: MEDICARE

## 2024-12-12 LAB
ANION GAP SERPL CALCULATED.3IONS-SCNC: 14 MMOL/L (ref 7–15)
BACTERIA BLD CULT: NORMAL
BACTERIA BLD CULT: NORMAL
BACTERIA UR CULT: ABNORMAL
BASOPHILS # BLD AUTO: 0 10E3/UL (ref 0–0.2)
BASOPHILS NFR BLD AUTO: 0 %
BUN SERPL-MCNC: 42.4 MG/DL (ref 8–23)
CALCIUM SERPL-MCNC: 8.4 MG/DL (ref 8.8–10.4)
CHLORIDE SERPL-SCNC: 106 MMOL/L (ref 98–107)
CREAT SERPL-MCNC: 1.66 MG/DL (ref 0.51–0.95)
EGFRCR SERPLBLD CKD-EPI 2021: 32 ML/MIN/1.73M2
EOSINOPHIL # BLD AUTO: 0.1 10E3/UL (ref 0–0.7)
EOSINOPHIL NFR BLD AUTO: 1 %
ERYTHROCYTE [DISTWIDTH] IN BLOOD BY AUTOMATED COUNT: 16.2 % (ref 10–15)
GLUCOSE BLDC GLUCOMTR-MCNC: 130 MG/DL (ref 70–99)
GLUCOSE BLDC GLUCOMTR-MCNC: 179 MG/DL (ref 70–99)
GLUCOSE BLDC GLUCOMTR-MCNC: 250 MG/DL (ref 70–99)
GLUCOSE BLDC GLUCOMTR-MCNC: 95 MG/DL (ref 70–99)
GLUCOSE BLDC GLUCOMTR-MCNC: 95 MG/DL (ref 70–99)
GLUCOSE SERPL-MCNC: 115 MG/DL (ref 70–99)
HCO3 SERPL-SCNC: 19 MMOL/L (ref 22–29)
HCT VFR BLD AUTO: 27.5 % (ref 35–47)
HGB BLD-MCNC: 8.8 G/DL (ref 11.7–15.7)
IMM GRANULOCYTES # BLD: 0.1 10E3/UL
IMM GRANULOCYTES NFR BLD: 1 %
LYMPHOCYTES # BLD AUTO: 1.2 10E3/UL (ref 0.8–5.3)
LYMPHOCYTES NFR BLD AUTO: 9 %
MCH RBC QN AUTO: 28.9 PG (ref 26.5–33)
MCHC RBC AUTO-ENTMCNC: 32 G/DL (ref 31.5–36.5)
MCV RBC AUTO: 90 FL (ref 78–100)
MONOCYTES # BLD AUTO: 0.8 10E3/UL (ref 0–1.3)
MONOCYTES NFR BLD AUTO: 6 %
NEUTROPHILS # BLD AUTO: 11.5 10E3/UL (ref 1.6–8.3)
NEUTROPHILS NFR BLD AUTO: 83 %
NRBC # BLD AUTO: 0 10E3/UL
NRBC BLD AUTO-RTO: 0 /100
PLATELET # BLD AUTO: 264 10E3/UL (ref 150–450)
POTASSIUM SERPL-SCNC: 3.9 MMOL/L (ref 3.4–5.3)
RBC # BLD AUTO: 3.05 10E6/UL (ref 3.8–5.2)
SODIUM SERPL-SCNC: 139 MMOL/L (ref 135–145)
WBC # BLD AUTO: 13.8 10E3/UL (ref 4–11)

## 2024-12-12 PROCEDURE — 97161 PT EVAL LOW COMPLEX 20 MIN: CPT | Mod: GP

## 2024-12-12 PROCEDURE — 99232 SBSQ HOSP IP/OBS MODERATE 35: CPT | Performed by: STUDENT IN AN ORGANIZED HEALTH CARE EDUCATION/TRAINING PROGRAM

## 2024-12-12 PROCEDURE — 97116 GAIT TRAINING THERAPY: CPT | Mod: GP

## 2024-12-12 PROCEDURE — 120N000001 HC R&B MED SURG/OB

## 2024-12-12 PROCEDURE — 80048 BASIC METABOLIC PNL TOTAL CA: CPT | Performed by: STUDENT IN AN ORGANIZED HEALTH CARE EDUCATION/TRAINING PROGRAM

## 2024-12-12 PROCEDURE — 99231 SBSQ HOSP IP/OBS SF/LOW 25: CPT | Performed by: PHYSICIAN ASSISTANT

## 2024-12-12 PROCEDURE — 250N000013 HC RX MED GY IP 250 OP 250 PS 637: Performed by: STUDENT IN AN ORGANIZED HEALTH CARE EDUCATION/TRAINING PROGRAM

## 2024-12-12 PROCEDURE — 97530 THERAPEUTIC ACTIVITIES: CPT | Mod: GP

## 2024-12-12 PROCEDURE — 250N000011 HC RX IP 250 OP 636: Performed by: INTERNAL MEDICINE

## 2024-12-12 PROCEDURE — 85004 AUTOMATED DIFF WBC COUNT: CPT | Performed by: STUDENT IN AN ORGANIZED HEALTH CARE EDUCATION/TRAINING PROGRAM

## 2024-12-12 PROCEDURE — 36415 COLL VENOUS BLD VENIPUNCTURE: CPT | Performed by: STUDENT IN AN ORGANIZED HEALTH CARE EDUCATION/TRAINING PROGRAM

## 2024-12-12 PROCEDURE — 250N000012 HC RX MED GY IP 250 OP 636 PS 637: Performed by: INTERNAL MEDICINE

## 2024-12-12 PROCEDURE — 250N000013 HC RX MED GY IP 250 OP 250 PS 637: Performed by: INTERNAL MEDICINE

## 2024-12-12 RX ADMIN — METOPROLOL SUCCINATE 25 MG: 25 TABLET, EXTENDED RELEASE ORAL at 09:47

## 2024-12-12 RX ADMIN — MEROPENEM 1 G: 1 INJECTION, POWDER, FOR SOLUTION INTRAVENOUS at 16:17

## 2024-12-12 RX ADMIN — GABAPENTIN 300 MG: 250 SUSPENSION ORAL at 21:23

## 2024-12-12 RX ADMIN — Medication 1 PACKET: at 09:49

## 2024-12-12 RX ADMIN — LEVOTHYROXINE SODIUM 175 MCG: 0.12 TABLET ORAL at 09:51

## 2024-12-12 RX ADMIN — FOLIC ACID 1 MG: 1 TABLET ORAL at 09:47

## 2024-12-12 RX ADMIN — URSODIOL 250 MG: 250 TABLET ORAL at 21:22

## 2024-12-12 RX ADMIN — FERROUS SULFATE TAB 325 MG (65 MG ELEMENTAL FE) 325 MG: 325 (65 FE) TAB at 16:17

## 2024-12-12 RX ADMIN — HYDROMORPHONE HYDROCHLORIDE 1 MG: 2 TABLET ORAL at 02:31

## 2024-12-12 RX ADMIN — SITAGLIPTIN 25 MG: 25 TABLET, FILM COATED ORAL at 09:48

## 2024-12-12 RX ADMIN — PREDNISONE 10 MG: 10 TABLET ORAL at 09:47

## 2024-12-12 RX ADMIN — APIXABAN 2.5 MG: 2.5 TABLET, FILM COATED ORAL at 21:22

## 2024-12-12 RX ADMIN — MEROPENEM 1 G: 1 INJECTION, POWDER, FOR SOLUTION INTRAVENOUS at 04:31

## 2024-12-12 RX ADMIN — CALCITRIOL CAPSULES 0.25 MCG 0.25 MCG: 0.25 CAPSULE ORAL at 09:47

## 2024-12-12 RX ADMIN — EZETIMIBE 10 MG: 10 TABLET ORAL at 09:48

## 2024-12-12 RX ADMIN — URSODIOL 250 MG: 250 TABLET ORAL at 09:47

## 2024-12-12 RX ADMIN — SIROLIMUS 1 MG: 0.5 TABLET ORAL at 09:47

## 2024-12-12 RX ADMIN — APIXABAN 2.5 MG: 2.5 TABLET, FILM COATED ORAL at 09:47

## 2024-12-12 ASSESSMENT — ACTIVITIES OF DAILY LIVING (ADL)
ADLS_ACUITY_SCORE: 54
ADLS_ACUITY_SCORE: 54
ADLS_ACUITY_SCORE: 59
ADLS_ACUITY_SCORE: 54
ADLS_ACUITY_SCORE: 57
ADLS_ACUITY_SCORE: 57
ADLS_ACUITY_SCORE: 56
ADLS_ACUITY_SCORE: 57
ADLS_ACUITY_SCORE: 56
ADLS_ACUITY_SCORE: 57
ADLS_ACUITY_SCORE: 59
ADLS_ACUITY_SCORE: 56
ADLS_ACUITY_SCORE: 59
ADLS_ACUITY_SCORE: 57
ADLS_ACUITY_SCORE: 54
ADLS_ACUITY_SCORE: 54
ADLS_ACUITY_SCORE: 59
ADLS_ACUITY_SCORE: 59
ADLS_ACUITY_SCORE: 54
ADLS_ACUITY_SCORE: 59
ADLS_ACUITY_SCORE: 59

## 2024-12-12 NOTE — PROGRESS NOTES
[facility] Urology Progress Note          Assessment and Plan:     Assessment:    Recurrent UTI    Concern for green pus with intermittent catheterization    Sepsis with concern for pyelonephritis    Urinary retention    CKD stage III    Atrial fibrillation    Hypertension    History of liver transplant    Diabetes mellitus type 2    Multiple drug allergies      Plan:   -Previous imaging did not show any concerns for abscess.  Discussed with the patient that the pus may be from a urethral diverticulum or related to her current urinary tract infection with evidence of pyelonephritis.  -Urine has cleared, and she has not required additional catheterization.  If she does require additional straight catheterization, would recommend Ron catheter placement.  -Continue with antibiotic for 10 to 14 days.  Urine culture has finalized with several oral options, but patient has multiple drug allergies listed, which makes treatment more difficult.  -Recommend patient follow-up with infectious disease and established with Dr. Gutierrez, as scheduled in January.  -Dr. Gutierrez may want additional imaging in the future such as MRI of the pelvis or repeat cystoscopy.  Patient endorses having a cystoscopy in January in [place], which was normal.  -Continue with bowel regimen.  -Anticipate no acute urological intervention.    Cherie Mujica PA-C   [facility] Urology  [phone]               Interval History:     Doing better.  Patient more alert today and had some confusion about yesterday.  Purewick in place draining clear viktoria urine.  Patient on IV meropenem.  Urine culture showed greater than 100,000 CFU per mL of Klebsiella pneumonia.  Blood cultures have no growth to date.  Patient denies nausea, vomiting, fevers, chills.  Tmax 99.8.  Creatinine 1.66 EGFR 32 (1.76 EGFR 30).  WBC 13.8 (12.7 (15.3) ).  Patient has not required additional straight catheterization since yesterday.              Review of Systems:     The  5 point Review of Systems is negative other than noted in the HPI             Medications:     Current Facility-Administered Medications   Medication Dose Route Frequency Provider Last Rate Last Admin    apixaban ANTICOAGULANT (ELIQUIS) tablet 2.5 mg  2.5 mg Oral BID Amrit Anthony DO   2.5 mg at 12/12/24 0947    Benefiber On The GO PACK 1 packet [Patient supplied]  1 packet Oral Daily Luis Miguel Nielson MD   1 packet at 12/12/24 0949    bisacodyl (DULCOLAX) suppository 10 mg  10 mg Rectal Daily PRN Amrit Anthony DO        calcitRIOL (ROCALTROL) capsule 0.25 mcg  0.25 mcg Oral Daily Amrit Anthony DO   0.25 mcg at 12/12/24 0947    calcium carbonate (TUMS) chewable tablet 1,000 mg  1,000 mg Oral 4x Daily PRN Amrit Anthony DO        glucose gel 15-30 g  15-30 g Oral Q15 Min PRN Amrit Anthony DO        Or    dextrose 50 % injection 25-50 mL  25-50 mL Intravenous Q15 Min PRN Amrit Anthony DO        Or    glucagon injection 1 mg  1 mg Subcutaneous Q15 Min PRN Amrit Anthony DO        evolocumab (REPATHA) injection 140 mg  140 mg Subcutaneous Q14 Days Luis Miguel Nielson MD        ezetimibe (ZETIA) tablet 10 mg  10 mg Oral Daily Amrit Anthony DO   10 mg at 12/12/24 0948    ferrous sulfate (FEROSUL) tablet 325 mg  325 mg Oral Daily Amrit Anthony DO   325 mg at 12/11/24 1025    folic acid (FOLVITE) tablet 1 mg  1 mg Oral Daily Amrit Anthony DO   1 mg at 12/12/24 0947    gabapentin (NEURONTIN) solution 300 mg  300 mg Oral At Bedtime Amrit Anthony DO   300 mg at 12/11/24 2002    HYDROmorphone (DILAUDID) half-tab 1 mg  1 mg Oral Q4H PRN Amrit Anthony DO   1 mg at 12/12/24 0231    HYDROmorphone (DILAUDID) injection 0.2 mg  0.2 mg Intravenous Q2H PRN Amrit Anthony         HYDROmorphone (DILAUDID) injection 0.4 mg  0.4 mg Intravenous Q2H PRN Amrit Anthony DO        HYDROmorphone (DILAUDID) tablet 2 mg  2 mg Oral Q4H PRN Amrit Anthony, DO         insulin aspart (NovoLOG) injection (RAPID ACTING)  1-3 Units Subcutaneous TID AC Amrit Anthony DO        insulin aspart (NovoLOG) injection (RAPID ACTING)  1-3 Units Subcutaneous At Bedtime Amrit Anthony DO        levothyroxine (SYNTHROID/LEVOTHROID) tablet 175 mcg  175 mcg Oral Daily Amrit Anthony DO   175 mcg at 12/12/24 0951    lidocaine (LMX4) cream   Topical Q1H PRN Amrit Anthony DO        lidocaine 1 % 0.1-1 mL  0.1-1 mL Other Q1H PRN Amrit Anthony DO        meclizine (ANTIVERT) tablet 25 mg  25 mg Oral TID PRN Amrit Anthony DO        melatonin tablet 1 mg  1 mg Oral At Bedtime PRN Amrit Anthony DO        meropenem (MERREM) 1 g vial to attach to  mL bag  1 g Intravenous Q12H Amrit Anthony DO   1 g at 12/12/24 0431    metoprolol succinate ER (TOPROL XL) 24 hr tablet 25 mg  25 mg Oral BID Luis Miguel Nielson MD   25 mg at 12/12/24 0947    naloxone (NARCAN) injection 0.2 mg  0.2 mg Intravenous Q2 Min PRN Amrit Anthony DO        Or    naloxone (NARCAN) injection 0.4 mg  0.4 mg Intravenous Q2 Min PRN Amirt Anthony DO        Or    naloxone (NARCAN) injection 0.2 mg  0.2 mg Intramuscular Q2 Min PRN Amrit Anthony DO        Or    naloxone (NARCAN) injection 0.4 mg  0.4 mg Intramuscular Q2 Min PRN Amrit Anthony DO        ondansetron (ZOFRAN ODT) ODT tab 4 mg  4 mg Oral Q6H PRN Amrit Anthony DO        Or    ondansetron (ZOFRAN) injection 4 mg  4 mg Intravenous Q6H PRN Amrit Anthony DO        Patient is already receiving anticoagulation with heparin, enoxaparin (LOVENOX), warfarin (COUMADIN)  or other anticoagulant medication   Does not apply Continuous PRN Amrit Anthony DO        predniSONE (DELTASONE) tablet 10 mg  10 mg Oral Daily Amrit Anthony DO   10 mg at 12/12/24 0947    senna-docusate (SENOKOT-S/PERICOLACE) 8.6-50 MG per tablet 1 tablet  1 tablet Oral BID PRN Amrit Anthony DO        Or     senna-docusate (SENOKOT-S/PERICOLACE) 8.6-50 MG per tablet 2 tablet  2 tablet Oral BID PRN Amrit Anthony D, DO        sirolimus (GENERIC EQUIVALENT) tablet 1 mg  1 mg Oral Daily Amrit Anthony, DO   1 mg at 12/12/24 0947    sitagliptin (JANUVIA) tablet 25 mg  25 mg Oral Daily Amrit Anthony, DO   25 mg at 12/12/24 0948    sodium chloride (PF) 0.9% PF flush 3 mL  3 mL Intracatheter Q8H Amrit Anthony, DO   3 mL at 12/12/24 0952    sodium chloride (PF) 0.9% PF flush 3 mL  3 mL Intracatheter q1 min prn Amrit Anthony, DO        ursodiol (ACTIGALL) tablet 250 mg  250 mg Oral BID Amrit Anthony, DO   250 mg at 12/12/24 0947                  Physical Exam:   Vitals were reviewed  Patient Vitals for the past 8 hrs:   BP Temp Temp src Pulse Resp SpO2   12/12/24 0700 102/48 97.4  F (36.3  C) Axillary 70 20 98 %     GEN: NAD, sitting up in bed  EYES: EOMI  MOUTH: MMM  NECK: Supple  RESP: Unlabored breathing  SKIN: Warm  NEURO: AAO  URO: Purewick in place with clear viktoria urine.           Data:     Lab Results   Component Value Date    NTBNPI 1,275 (H) 06/13/2023     Lab Results   Component Value Date    WBC 13.8 (H) 12/12/2024    WBC 12.7 (H) 12/11/2024    WBC 15.3 (H) 12/10/2024    HGB 8.8 (L) 12/12/2024    HGB 10.2 (L) 12/11/2024    HGB 10.8 (L) 12/10/2024    HCT 27.5 (L) 12/12/2024    HCT 32.0 (L) 12/11/2024    HCT 32.9 (L) 12/10/2024    MCV 90 12/12/2024    MCV 91 12/11/2024    MCV 89 12/10/2024     12/12/2024     12/11/2024     12/10/2024     Lab Results   Component Value Date    INR 1.33 (H) 08/26/2019    INR 1.06 05/18/2018    INR 1.02 05/16/2016      All cultures:  Recent Labs   Lab 12/10/24  1919 12/10/24  1807 12/10/24  1739   CULTURE No growth after 1 day No growth after 1 day >100,000 CFU/mL Klebsiella pneumoniae*

## 2024-12-12 NOTE — PROGRESS NOTES
Antimicrobial Stewardship Team Note    Antimicrobial Stewardship Program - A joint venture between [facility] and [facility] [employer] to optimize antibiotic management.     Patient: Luz Thompson  MRN: [id number]  Allergies: Fluconazole; Mycophenolate; Penicillins; Simvastatin; Methotrexate; Morphine and codeine; Quinolones; Benadryl [diphenhydramine hcl]; Capsules, empty gelatin [gelatin]; Cephalosporins; Ciprofloxacin hcl; Lansoprazole; Azithromycin; Bactrim [sulfamethoxazole-trimethoprim]; Doxycycline; Lisinopril; Omeprazole; Tolectin [nsaids]; Tolmetin; and Tramadol    Brief Summary: 74 yof, hospital day admitted for fever, chills, dysuria and left flank pain; subsequently found to also have diverticulitis. PMH: primary biliary cirrhosis s/p previous liver transplant, chronic anemia, CKD, anxiety, previous stroke, previous DVT, AFib, diverticulosis, glaucoma, HTN, hyperlipidemia, recurrent UTI, migraines, osteoarthritis, hypothyroidism, thyroid cancer, Sjogren's syndrome, and T2DM       Active Anti-infective Medications   (From admission, onward)                 Start     Stop    12/11/24 1600  meropenem  1 g,   Intravenous,   EVERY 12 HOURS        Sepsis       --                  Assessment: Day 3 of meropenem for UTI and diverticulitis. Urine culture finalized as > 100K Klebsiella pneumoniae (R to ampicillin, I to NTF). De-escalation to cefepime and metronidazole will treat UTI and still provide gram negative and anaerobic coverage for diverticulitis. Step down to oral therapy such as quinolones or bactrim  will be challenging in lieu of multiple antibiotic intolerances.     Of note, patient did have skin testing in Aug 2019 (see Dr. Will Rai note 08/02/2019 - DERMATOLOGY) and none of the antibiotics tested resulted in positive reaction (fluconazole, doxycycline, azithromycin, penicillins, and cephalosporins negative for immediate and delayed readings) but it was deemed  "could be exposed to all these \"under close observation\".  At that time, patient wanted to discuss with Infectious Disease whether to proceed with oral penicillin provocation testing and it is unclear whether this happened or not.    Recommendations: STOP meropenem; De-escalate to Cefepime 2 g IV q24h & Metronidazole 500 mg IV q12h    Consult Recommendation:  No   Recommended labs:  none   Other Recommendation(s):   n/a     Pharmacy took the following actions:  Sticky Note to Physician in EMR.    Discussed with ID Staff - Dr. Iwona Vann[facility]            Vital Signs/Clinical Features:  Vitals         12/10 0700 12/11 0659 12/11 0700  12/12 0659 12/12 0700  12/12 1429   Most Recent      Temp ( F) 98.7 -  100.1    98.4 -  99.8      97.4     97.4 (36.3) 12/12 0700    Pulse 91 -  102    75 -  90      70     70 12/12 0700    Resp   20      20      20     20 12/12 0700    /50 -  120/52    119/57 -  127/57      102/48     102/48 12/12 0700    SpO2 (%) 94 -  100      95      98     98 12/12 0700            Labs  Estimated Creatinine Clearance: 32.1 mL/min (A) (based on SCr of 1.66 mg/dL (H)).  Recent Labs   Lab Test 10/29/24  1911 11/01/24  1110 11/14/24  1506 12/10/24  1446 12/11/24  0755 12/12/24  0614   CR 1.45* 1.66* 1.56* 1.75* 1.76* 1.66*       Recent Labs   Lab Test 08/31/17  1306 09/01/17  0832 09/02/17  0648 09/03/17  0912 05/18/18  0731 05/19/18  0629 08/26/19  2331 08/27/19  0532 09/30/24  1545 10/11/24  2212 10/29/24  1530 11/01/24  1110 11/14/24  1506 12/10/24  1446 12/11/24  0755 12/12/24  0614   WBC 10.6 9.4 10.2   < > 8.0   < > 12.5*   < > 8.2   < > 10.3 9.8 9.7 15.3* 12.7* 13.8*   ANEU 8.6* 6.5 7.4  --  5.0  --  10.8*  --  3.7  --   --   --   --   --   --   --    ALYM 1.3 1.7 1.7  --  1.9  --  0.8  --  2.7  --   --   --   --   --   --   --    KATHY 0.7 1.0 0.9  --  1.0  --  0.7  --  1.0  --   --   --   --   --   --   --    AEOS 0.0 0.1 0.2  --  0.1  --  0.0  --  0.2  --   --   " --   --   --   --   --    HGB 12.4 11.1* 10.5*   < > 11.6*   < > 10.5*   < > 11.1*   < > 10.9* 10.7* 11.8 10.8* 10.2* 8.8*   HCT 38.5 35.0 34.0*   < > 36.0   < > 33.0*   < > 35.0   < > 34.8* 33.5* 36.4 32.9* 32.0* 27.5*   MCV 86 87 89   < > 86   < > 86   < > 93   < > 93 92 90 89 91 90    269 259   < > 324   < > 226   < > 388   < > 443 373 389 335 317 264    < > = values in this interval not displayed.       Recent Labs   Lab Test 10/21/24  1330 10/29/24  1530 11/01/24  1110 11/14/24  1506 12/10/24  1446 12/11/24  0755   BILITOTAL 0.3 0.2 0.4 0.4 0.6 0.7   ALKPHOS 136 132 164* 126 101 114   ALBUMIN 3.6 3.6 3.7 3.9 3.6 3.1*   AST 28 18 27 22 32 132*   ALT 32 19 38 27 30 143*       Recent Labs   Lab Test 09/01/17  0832 09/02/17  0648 09/03/17  0912 07/30/18  0855 05/20/19  0957 08/26/19  2331 08/26/19  2331 08/27/19  0403 06/13/23  1820 06/15/23  1028 06/23/23  1422 09/22/24  0144 09/22/24  0338 09/22/24  0513 09/22/24  0710 10/11/24  2301 12/10/24  1446   PCAL  --   --   --   --   --   --   --  3.15  --   --   --  0.13  --   --   --   --   --    LACT  --   --   --   --   --  1.8   < > 0.5*  --   --   --  4.0* 2.9* 2.9* 2.1* 0.8 1.8   CRP 51.0* 71.0* 64.0* 5.2 <2.9 180.0*  --   --   --   --   --   --   --   --   --   --   --    CRPI  --   --   --   --   --   --   --   --  21.05* 9.40* <3.00  --   --   --   --   --   --    SED  --   --   --  73* 47* 78*  --   --   --   --   --   --   --   --   --   --   --     < > = values in this interval not displayed.       Recent Labs   Lab Test 10/23/17  1025 04/17/18  0942 08/29/19  0544 09/04/24  1008   VCON  --    < > 2.2  --    CYCLSP Canceled, Test credited  --   --   --    RAPAMY  --    < > 9.8 2.3*    < > = values in this interval not displayed.       Culture Results:  7-Day Micro Results       Procedure Component Value Units Date/Time    Blood Culture Hand, Right [[id number]]  (Normal) Collected: 12/10/24 3649    Order Status: Completed Lab Status: Preliminary  result Updated: 12/11/24 2246    Specimen: Blood from Hand, Right      Culture No growth after 1 day    Blood Culture Peripheral Blood [[id number]]  (Normal) Collected: 12/10/24 1807    Order Status: Completed Lab Status: Preliminary result Updated: 12/11/24 2016    Specimen: Peripheral Blood      Culture No growth after 1 day    Urine Culture [[id number]]  (Abnormal)  (Susceptibility) Collected: 12/10/24 1739    Order Status: Completed Lab Status: Final result Updated: 12/12/24 0103    Specimen: Urine, Catheter      Culture >100,000 CFU/mL Klebsiella pneumoniae    Susceptibility       Klebsiella pneumoniae (1)       Antibiotic Interpretation Sensitivity   Method Status    Ampicillin Resistant   MARYJANE Final     Intrinsically Resistant       Ampicillin/ Sulbactam Susceptible 8 ug/mL MARYJANE Final    Piperacillin/Tazobactam Susceptible <=4 ug/mL MARYJANE Final    Cefazolin Susceptible 2 ug/mL MARYJANE Final    Ceftazidime Susceptible <=0.5 ug/mL MARYJANE Final    Ceftriaxone Susceptible <=0.25 ug/mL MARYJANE Final    Cefepime Susceptible <=0.12 ug/mL MARYJANE Final    Extended Spectrum Beta-Lactamase  [*]  ESBL Negative Negative ug/mL MARYJANE Final    Ertapenem  [*]  Susceptible <=0.12 ug/mL MARYJANE Final    Meropenem  [*]  Susceptible <=0.25 ug/mL MARYJANE Final    Gentamicin Susceptible <=1 ug/mL MARYJANE Final    Ciprofloxacin Susceptible <=0.06 ug/mL MARYJANE Final    Levofloxacin Susceptible <=0.12 ug/mL MARYJANE Final    Nitrofurantoin Intermediate 64 ug/mL MARYJANE Final    Trimethoprim/Sulfamethoxazole Susceptible <=1/19 ug/mL MARYJANE Final               [*]  Suppressed Antibiotic                           Recent Labs   Lab Test 08/27/19  0224 08/20/24  1454 09/22/24  0233 10/11/24  2200 10/14/24  1244 11/06/24  1048 12/10/24  1739   URINEPH 6.5   < > 7.5* 6.0 5.5 5.5 5.5   NITRITE Negative   < > Negative Negative Negative Negative Negative   LEUKEST Moderate*   < > Moderate* Large* Negative Trace* Large*   WBCU 12*  --  116* >182* 5  --  >182*    < > = values in this  interval not displayed.                         Imaging: CT Abdomen Pelvis w/o Contrast    Result Date: 12/10/2024  EXAM: CT ABDOMEN PELVIS W/O CONTRAST LOCATION: [facility] DATE: 12/10/2024 INDICATION: abd pain, greatest on the left COMPARISON: CT abdomen and pelvis 08/27/2019 TECHNIQUE: CT scan of the abdomen and pelvis was performed without IV contrast. Multiplanar reformats were obtained. Dose reduction techniques were used. CONTRAST: None. FINDINGS: LOWER CHEST: Calcified pleural plaques in calcified granulomas right lower lobe. Small groundglass opacities right lower lobe. HEPATOBILIARY: Liver transplant. PANCREAS: Atrophic. SPLEEN: Normal. ADRENAL GLANDS: Normal. KIDNEYS/BLADDER: A few small hyperdense lesions left renal cortex, too small to further characterize. No stones or hydronephrosis.,. BOWEL: Diverticula throughout the colon, most pronounced within the sigmoid colon. There is a small amount of free pelvic fluid adjacent to the posterior sigmoid colon. No evidence for obstruction. Moderate amount of stool throughout the colon to the level of the rectum. LYMPH NODES: Normal. VASCULATURE: Normal. PELVIC ORGANS: Hysterectomy. Trace free pelvic fluid. MUSCULOSKELETAL: Osteopenia. Degenerative disc disease lower thoracic and lower lumbar spine. Several small fat-containing ventral wall hernias.     IMPRESSION: 1.  Left colonic diverticula. Small amount of fluid adjacent to the sigmoid colon possibly secondary to acute diverticulitis. No additional inflammatory changes or evidence for obstruction. 2.  Constipation. 3.  Liver transplant. 4.  Calcified pleural plaques right lung likely related to prior asbestos exposure. Small groundglass opacities right lower lobe, of indeterminate time frame, possibly chronic. 5.  Tiny hyperdense lesions left kidney too small to further characterize, but could represent proteinaceous or hemorrhagic cysts.

## 2024-12-12 NOTE — PROGRESS NOTES
[facility]    Medicine Progress Note - Hospitalist Service    Date of Admission:  12/10/2024    Assessment & Plan   Luz Thompson is a 74 year old female admitted on 12/10/2024. 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  She presented to emergency room with fevers, chills, dysuria, and left flank pain.     She was hemodynamically stable on arrival.  WBC count of 15.3 with UA showing more than 182 WBCs and large leukocyte esterase with negative nitrite.  He tested negative for COVID-19, influenza A/B and RSV.    CT abdomen showed left colonic diverticula with small amount of fluid adjacent to the sigmoid colon possibly secondary to diverticulitis.     Is not having urinary retention and needing straight cath, this morning had some greenish pus coming out with straight cath.      Sepsis due to UTI, suspected pyelonephritis.  Given history of ESBL and VRE, started on meropenem.  Blood cultures are negative but urine cultures are growing Klebsiella more than 100,000 CFU and is not ESBL.  Patient has multiple allergies listed but cephalosporin allergy injection is only itching which patient usually gets right away.  Patient had a skin allergy test in 2019 which did not yield an allergic reaction as was thought to be lower risk.  Will try to give Omnicef or ciprofloxacin tomorrow.  May need outpatient cystoscopy versus pelvic MRI.  Given pus on straight cath, urology was consulted.  No bladder abscess was seen on CT. per urology there could have been a diverticulum  Urinary retention, initially needed straight cath but now  "urinating spontaneously.    Diverticulosis with diverticulitis.  CT showed left colonic diverticula with small amount of fluid adjacent to the sigmoid colon, secondary to acute diverticulitis.  Meropenem should cover for diverticulitis.  Patient did not have any air in the bladder to suggest colovesical fistula.    Chronic medical conditions  Primary biliary cirrhosis: History of previous liver transplant.  Continue ursodiol, prednisone 10 mg daily and sirolimus.  CKD stage IIIb: Continue at baseline.  Atrial fibrillation: Resume metoprolol and apixaban.  Drug-induced coagulation defect.  Essential hypertension: Hydralazine and Lasix held due to low blood pressure.  Diabetes mellitus type 2: On sliding scale insulin.  Hypothyroidism: Continue Synthroid.  Hyperlipidemia.  On ezetimibe and Repatha    Family communication.  Discussed with patient at bedside.    Disposition  Will likely discharge home tomorrow            Diet: Combination Diet Regular Diet Adult    DVT Prophylaxis: DOAC  Ron Catheter: Not present  Lines: None     Cardiac Monitoring: ACTIVE order. Indication: Tachyarrhythmias, acute (48 hours)  Code Status: Full Code      Clinically Significant Risk Factors          # Hyperchloremia: Highest Cl = 108 mmol/L in last 2 days, will monitor as appropriate          # Hypoalbuminemia: Lowest albumin = 3.1 g/dL at 12/11/2024  7:55 AM, will monitor as appropriate       # Hypertension: Noted on problem list           # DMII: A1C = 6.9 % (Ref range: <5.7 %) within past 6 months, PRESENT ON ADMISSION  # Overweight: Estimated body mass index is 27 kg/m  as calculated from the following:    Height as of this encounter: 1.715 m (5' 7.5\").    Weight as of this encounter: 79.4 kg (175 lb)., PRESENT ON ADMISSION            Social Drivers of Health    Housing Stability: Low Risk  (12/11/2024)    Housing Stability     Do you have housing? : Yes     Are you worried about losing your housing?: No   Recent Concern: Housing " Stability - High Risk (10/12/2024)    Housing Stability     Do you have housing? : No     Are you worried about losing your housing?: No   Tobacco Use: Medium Risk (12/9/2024)    Patient History     Smoking Tobacco Use: Former     Smokeless Tobacco Use: Never   Physical Activity: Insufficiently Active (11/9/2023)    [facility]    Exercise Vital Sign     Days of Exercise per Week: 5 days     Minutes of Exercise per Session: 10 min          Disposition Plan     Medically Ready for Discharge: Anticipated Tomorrow             Luis Miguel Nielson MD  Hospitalist Service  [facility]  Securely message with [employer] (more info)  Text page via [employer] Paging/Directory   ______________________________________________________________________    Interval History   Urinary retention resolved.    Physical Exam   Vital Signs: Temp: 97.4  F (36.3  C) Temp src: Axillary BP: 102/48 Pulse: 70   Resp: 20 SpO2: 98 % O2 Device: None (Room air)    Weight: 175 lbs 0 oz    General Appearance: Alert awake and oriented x 3  Respiratory: Clear to auscultation  Cardiovascular: S1-S2 normal  GI: Soft.  Left flank tenderness.  No suprapubic tenderness.  Skin: No rash  Other: No edema      Medical Decision Making       MANAGEMENT DISCUSSED with the following over the past 24 hours: Patient and RN       Data     I have personally reviewed the following data over the past 24 hrs:    13.8 (H)  \   8.8 (L)   / 264     139 106 42.4 (H) /  95   3.9 19 (L) 1.66 (H) \       Imaging results reviewed over the past 24 hrs:   No results found for this or any previous visit (from the past 24 hours).

## 2024-12-12 NOTE — PROGRESS NOTES
12/12/24 1432   Appointment Info   Signing Clinician's Name / Credentials (PT) Edel Montiel DPT   Living Environment   People in Home alone   Current Living Arrangements independent living facility   Home Accessibility no concerns   Transportation Anticipated family or friend will provide   Self-Care   Usual Activity Tolerance moderate   Current Activity Tolerance fair   Equipment Currently Used at Home cane, straight;shower chair;walker, rolling   Fall history within last six months yes   Number of times patient has fallen within last six months 1   Activity/Exercise/Self-Care Comment Ifrah with cane at baseline. Receives 2 meals/day, granddaughter assists with cleaning, daughter provides supervision with showers   General Information   Onset of Illness/Injury or Date of Surgery 12/10/24   Referring Physician Luis Miguel Nielson MD   Patient/Family Therapy Goals Statement (PT) return home   Pertinent History of Current Problem (include personal factors and/or comorbidities that impact the POC) Luz Thompson is a 74 year old female admitted on 12/10/2024. 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  She presented to emergency room with fevers, chills, dysuria, and left flank pain.   Existing Precautions/Restrictions fall   Cognition   Affect/Mental Status (Cognition) WNL   Orientation Status (Cognition) oriented x 4   Follows Commands (Cognition) WNL   Pain Assessment   Patient Currently in Pain Yes, see Vital Sign flowsheet  (chronic L knee pain, did not provide value)   Integumentary/Edema   Integumentary/Edema Comments normal aging changes    Posture    Posture Forward head position;Protracted shoulders   Range of Motion (ROM)   Range of Motion ROM is WFL   Strength (Manual Muscle Testing)   Strength (Manual Muscle Testing) Deficits observed during functional mobility   Bed Mobility   Comment, (Bed Mobility) not assessed at eval   Transfers   Comment, (Transfers) Maynor sit<>stand with cane from low surface   Gait/Stairs (Locomotion)   Distance in Feet (Gait) 10' for eval   Comment, (Gait/Stairs) CGA with cane ambulation   Balance   Balance Comments good sitting balance,  fair standing balance with  cane for support   Sensory Examination   Sensory Perception patient reports no sensory changes   Clinical Impression   Criteria for Skilled Therapeutic Intervention Yes, treatment indicated   PT Diagnosis (PT) impaired mobility   Influenced by the following impairments decreased activity tolerance, weakness, impaired balace, pain   Functional limitations due to impairments impaired bed mobility, transfers, ambulation   Clinical Presentation (PT Evaluation Complexity) stable   Clinical Presentation Rationale clinical judgement, chart review   Clinical Decision Making (Complexity) low complexity   Planned Therapy Interventions (PT) balance training;bed mobility training;gait training;home exercise program;neuromuscular re-education;patient/family education;strengthening;transfer training;home program guidelines;risk factor education;progressive activity/exercise   Risk & Benefits of therapy have been explained evaluation/treatment results reviewed;care plan/treatment goals reviewed;risks/benefits reviewed;current/potential barriers reviewed;participants voiced agreement with care plan;participants included;patient   PT Total Evaluation Time   PT Cuco Low Complexity Minutes (48189) 6   Physical Therapy Goals   PT Frequency Daily   PT Predicted Duration/Target Date for Goal Attainment 12/15/24   PT Goals Bed Mobility;Transfers;Gait   PT: Bed Mobility  Independent;Supine to/from sit   PT: Transfers Modified independent;Sit to/from stand;Assistive device   PT: Gait Modified independent;Straight cane;100 feet   Interventions   Interventions Quick Adds Therapeutic Activity;Gait Training   Therapeutic Activity   Therapeutic Activities: dynamic activities to improve functional performance Minutes (53907) 12   Symptoms Noted During/After Treatment Fatigue   Treatment Detail/Skilled Intervention Pt sitting in recliner, agreeable. After eval, cued pt to complete seated exercises including LAQs andseated marches, [id number] bilaterally. Cues for slow and controlled movemet, edu on intenisty and frequency of exercises. Encouraged to complete IND. Pt hopingto be IND in room, discussed concern at this time, but encouraged frequent OOBmobility to progress toward that  goal. All questions answered, left in recliner with alarm on and needs in reach, nurse updated.   Gait Training   Gait Training Minutes (68906) 8   Symptoms Noted During/After Treatment (Gait Training) fatigue;increased pain   Treatment Detail/Skilled Intervention After eval, pt ambulated another 90' with hurry cane and CGA progressingto SBA. Demos short step lenth, wide GABY, mild unsteadiness but not LOB. Improvement as bout progressed, limited by L knee pain. Encouraged frequent ambulation with nursing to progrss mobility   PT Discharge Planning   PT Plan repeated STS, progress ambulation distance,standing therex   PT Discharge Recommendation (DC Rec) home with assist;home with home care physical therapy   PT Rationale for DC Rec Pt below baseline level of mobility, currently Ax1 with cane. Anticipate with  IP PT that pt will progress to  return home  with HHPT to continue progressing mobility andbalance   PT Brief overview of current status Ax1 cane   Physical Therapy Time and Intention   Timed Code Treatment Minutes 20   Total Session Time (sum of timed and untimed services) 26

## 2024-12-12 NOTE — PLAN OF CARE
"Pt up with 1 to BSC. +BM. Incont urine. C/O pain inright flank. PO dialudid given. Turns self in bed. A/Ox4. Calls appropriately, able to verbalize needs. Saline locked. IV Merrem.  Glucose overnight 130.     Major Shift Events:   PO dilaudid 1 for flank pain. Incont urine. Bed changed, otherwise slept well.     Treatment Plan:   IV merrem, glucose monitoring, skin monitoring, monitor fevers. Symptom management. Urine and blood cx pending. Maintain isolation.                      Problem: Adult Inpatient Plan of Care  Goal: Plan of Care Review  Description: The Plan of Care Review/Shift note should be completed every shift.  The Outcome Evaluation is a brief statement about your assessment that the patient is improving, declining, or no change.  This information will be displayed automatically on your shift  note.  Outcome: Progressing  Flowsheets (Taken 12/12/2024 0704)  Plan of Care Reviewed With: patient  Overall Patient Progress: improving  Goal: Patient-Specific Goal (Individualized)  Description: You can add care plan individualizations to a care plan. Examples of Individualization might be:  \"Parent requests to be called daily at 9am for status\", \"I have a hard time hearing out of my right ear\", or \"Do not touch me to wake me up as it startles  me\".  Outcome: Progressing  Goal: Absence of Hospital-Acquired Illness or Injury  Outcome: Progressing  Intervention: Identify and Manage Fall Risk  Recent Flowsheet Documentation  Taken 12/11/2024 2130 by Suzan Mejia, RN  Safety Promotion/Fall Prevention: safety round/check completed  Intervention: Prevent Skin Injury  Recent Flowsheet Documentation  Taken 12/11/2024 2130 by Suzan Mejia RN  Body Position: position changed independently  Intervention: Prevent Infection  Recent Flowsheet Documentation  Taken 12/11/2024 2130 by Suzan Mejia, RN  Infection Prevention: rest/sleep promoted  Goal: Optimal Comfort and Wellbeing  Outcome: " Progressing  Goal: Readiness for Transition of Care  Outcome: Progressing   Goal Outcome Evaluation:      Plan of Care Reviewed With: patient    Overall Patient Progress: improvingOverall Patient Progress: improving

## 2024-12-13 VITALS
BODY MASS INDEX: 26.52 KG/M2 | DIASTOLIC BLOOD PRESSURE: 56 MMHG | TEMPERATURE: 98.8 F | HEART RATE: 69 BPM | RESPIRATION RATE: 20 BRPM | WEIGHT: 175 LBS | HEIGHT: 68 IN | OXYGEN SATURATION: 97 % | SYSTOLIC BLOOD PRESSURE: 114 MMHG

## 2024-12-13 LAB
ALBUMIN SERPL BCG-MCNC: 2.8 G/DL (ref 3.5–5.2)
ALP SERPL-CCNC: 98 U/L (ref 40–150)
ALT SERPL W P-5'-P-CCNC: 71 U/L (ref 0–50)
ANION GAP SERPL CALCULATED.3IONS-SCNC: 11 MMOL/L (ref 7–15)
AST SERPL W P-5'-P-CCNC: 28 U/L (ref 0–45)
BASOPHILS # BLD AUTO: 0 10E3/UL (ref 0–0.2)
BASOPHILS NFR BLD AUTO: 0 %
BILIRUB DIRECT SERPL-MCNC: <0.2 MG/DL (ref 0–0.3)
BILIRUB SERPL-MCNC: 0.3 MG/DL
BUN SERPL-MCNC: 40.4 MG/DL (ref 8–23)
CALCIUM SERPL-MCNC: 8.4 MG/DL (ref 8.8–10.4)
CHLORIDE SERPL-SCNC: 104 MMOL/L (ref 98–107)
CREAT SERPL-MCNC: 1.55 MG/DL (ref 0.51–0.95)
EGFRCR SERPLBLD CKD-EPI 2021: 35 ML/MIN/1.73M2
EOSINOPHIL # BLD AUTO: 0.1 10E3/UL (ref 0–0.7)
EOSINOPHIL NFR BLD AUTO: 1 %
ERYTHROCYTE [DISTWIDTH] IN BLOOD BY AUTOMATED COUNT: 15.8 % (ref 10–15)
GLUCOSE BLDC GLUCOMTR-MCNC: 114 MG/DL (ref 70–99)
GLUCOSE BLDC GLUCOMTR-MCNC: 136 MG/DL (ref 70–99)
GLUCOSE BLDC GLUCOMTR-MCNC: 142 MG/DL (ref 70–99)
GLUCOSE BLDC GLUCOMTR-MCNC: 189 MG/DL (ref 70–99)
GLUCOSE BLDC GLUCOMTR-MCNC: 90 MG/DL (ref 70–99)
GLUCOSE SERPL-MCNC: 97 MG/DL (ref 70–99)
HCO3 SERPL-SCNC: 20 MMOL/L (ref 22–29)
HCT VFR BLD AUTO: 26.9 % (ref 35–47)
HGB BLD-MCNC: 8.4 G/DL (ref 11.7–15.7)
IMM GRANULOCYTES # BLD: 0.1 10E3/UL
IMM GRANULOCYTES NFR BLD: 1 %
LYMPHOCYTES # BLD AUTO: 1.5 10E3/UL (ref 0.8–5.3)
LYMPHOCYTES NFR BLD AUTO: 14 %
MCH RBC QN AUTO: 29.2 PG (ref 26.5–33)
MCHC RBC AUTO-ENTMCNC: 31.2 G/DL (ref 31.5–36.5)
MCV RBC AUTO: 93 FL (ref 78–100)
MONOCYTES # BLD AUTO: 0.9 10E3/UL (ref 0–1.3)
MONOCYTES NFR BLD AUTO: 9 %
NEUTROPHILS # BLD AUTO: 7.6 10E3/UL (ref 1.6–8.3)
NEUTROPHILS NFR BLD AUTO: 74 %
NRBC # BLD AUTO: 0 10E3/UL
NRBC BLD AUTO-RTO: 0 /100
PLATELET # BLD AUTO: 263 10E3/UL (ref 150–450)
POTASSIUM SERPL-SCNC: 4 MMOL/L (ref 3.4–5.3)
PROT SERPL-MCNC: 5.9 G/DL (ref 6.4–8.3)
RBC # BLD AUTO: 2.88 10E6/UL (ref 3.8–5.2)
SODIUM SERPL-SCNC: 135 MMOL/L (ref 135–145)
WBC # BLD AUTO: 10.3 10E3/UL (ref 4–11)

## 2024-12-13 PROCEDURE — 84132 ASSAY OF SERUM POTASSIUM: CPT | Performed by: STUDENT IN AN ORGANIZED HEALTH CARE EDUCATION/TRAINING PROGRAM

## 2024-12-13 PROCEDURE — 80076 HEPATIC FUNCTION PANEL: CPT | Performed by: STUDENT IN AN ORGANIZED HEALTH CARE EDUCATION/TRAINING PROGRAM

## 2024-12-13 PROCEDURE — 99232 SBSQ HOSP IP/OBS MODERATE 35: CPT | Performed by: STUDENT IN AN ORGANIZED HEALTH CARE EDUCATION/TRAINING PROGRAM

## 2024-12-13 PROCEDURE — 84155 ASSAY OF PROTEIN SERUM: CPT | Performed by: STUDENT IN AN ORGANIZED HEALTH CARE EDUCATION/TRAINING PROGRAM

## 2024-12-13 PROCEDURE — 250N000013 HC RX MED GY IP 250 OP 250 PS 637: Performed by: STUDENT IN AN ORGANIZED HEALTH CARE EDUCATION/TRAINING PROGRAM

## 2024-12-13 PROCEDURE — 82947 ASSAY GLUCOSE BLOOD QUANT: CPT | Performed by: STUDENT IN AN ORGANIZED HEALTH CARE EDUCATION/TRAINING PROGRAM

## 2024-12-13 PROCEDURE — 250N000013 HC RX MED GY IP 250 OP 250 PS 637: Performed by: INTERNAL MEDICINE

## 2024-12-13 PROCEDURE — 120N000001 HC R&B MED SURG/OB

## 2024-12-13 PROCEDURE — 250N000012 HC RX MED GY IP 250 OP 636 PS 637: Performed by: INTERNAL MEDICINE

## 2024-12-13 PROCEDURE — 85025 COMPLETE CBC W/AUTO DIFF WBC: CPT | Performed by: STUDENT IN AN ORGANIZED HEALTH CARE EDUCATION/TRAINING PROGRAM

## 2024-12-13 PROCEDURE — 36415 COLL VENOUS BLD VENIPUNCTURE: CPT | Performed by: STUDENT IN AN ORGANIZED HEALTH CARE EDUCATION/TRAINING PROGRAM

## 2024-12-13 PROCEDURE — 250N000011 HC RX IP 250 OP 636: Performed by: INTERNAL MEDICINE

## 2024-12-13 RX ORDER — CIPROFLOXACIN 500 MG/1
500 TABLET, FILM COATED ORAL EVERY 12 HOURS SCHEDULED
Status: DISCONTINUED | OUTPATIENT
Start: 2024-12-13 | End: 2024-12-14

## 2024-12-13 RX ORDER — DIPHENHYDRAMINE HCL 25 MG
50 CAPSULE ORAL EVERY 6 HOURS PRN
Status: DISCONTINUED | OUTPATIENT
Start: 2024-12-13 | End: 2024-12-15 | Stop reason: HOSPADM

## 2024-12-13 RX ADMIN — APIXABAN 2.5 MG: 2.5 TABLET, FILM COATED ORAL at 11:03

## 2024-12-13 RX ADMIN — HYDROMORPHONE HYDROCHLORIDE 1 MG: 2 TABLET ORAL at 06:28

## 2024-12-13 RX ADMIN — Medication 1 PACKET: at 11:09

## 2024-12-13 RX ADMIN — DIPHENHYDRAMINE HYDROCHLORIDE 50 MG: 25 CAPSULE ORAL at 20:34

## 2024-12-13 RX ADMIN — CALCITRIOL CAPSULES 0.25 MCG 0.25 MCG: 0.25 CAPSULE ORAL at 11:03

## 2024-12-13 RX ADMIN — APIXABAN 2.5 MG: 2.5 TABLET, FILM COATED ORAL at 20:26

## 2024-12-13 RX ADMIN — CIPROFLOXACIN 500 MG: 500 TABLET ORAL at 20:33

## 2024-12-13 RX ADMIN — LEVOTHYROXINE SODIUM 175 MCG: 0.12 TABLET ORAL at 06:28

## 2024-12-13 RX ADMIN — FERROUS SULFATE TAB 325 MG (65 MG ELEMENTAL FE) 325 MG: 325 (65 FE) TAB at 15:31

## 2024-12-13 RX ADMIN — SITAGLIPTIN 50 MG: 50 TABLET, FILM COATED ORAL at 11:02

## 2024-12-13 RX ADMIN — EZETIMIBE 10 MG: 10 TABLET ORAL at 11:02

## 2024-12-13 RX ADMIN — URSODIOL 250 MG: 250 TABLET ORAL at 11:02

## 2024-12-13 RX ADMIN — SIROLIMUS 1 MG: 0.5 TABLET ORAL at 11:02

## 2024-12-13 RX ADMIN — GABAPENTIN 300 MG: 250 SUSPENSION ORAL at 22:11

## 2024-12-13 RX ADMIN — HYDROMORPHONE HYDROCHLORIDE 0.2 MG: 0.2 INJECTION, SOLUTION INTRAMUSCULAR; INTRAVENOUS; SUBCUTANEOUS at 09:28

## 2024-12-13 RX ADMIN — METOPROLOL SUCCINATE 25 MG: 25 TABLET, EXTENDED RELEASE ORAL at 20:26

## 2024-12-13 RX ADMIN — MEROPENEM 1 G: 1 INJECTION, POWDER, FOR SOLUTION INTRAVENOUS at 04:10

## 2024-12-13 RX ADMIN — FOLIC ACID 1 MG: 1 TABLET ORAL at 11:03

## 2024-12-13 RX ADMIN — URSODIOL 250 MG: 250 TABLET ORAL at 22:11

## 2024-12-13 RX ADMIN — PREDNISONE 10 MG: 10 TABLET ORAL at 11:03

## 2024-12-13 ASSESSMENT — ACTIVITIES OF DAILY LIVING (ADL)
ADLS_ACUITY_SCORE: 59
ADLS_ACUITY_SCORE: 63
ADLS_ACUITY_SCORE: 56
ADLS_ACUITY_SCORE: 56
ADLS_ACUITY_SCORE: 63
ADLS_ACUITY_SCORE: 59
ADLS_ACUITY_SCORE: 59
ADLS_ACUITY_SCORE: 56
ADLS_ACUITY_SCORE: 56
DEPENDENT_IADLS:: COOKING;MEAL PREPARATION
ADLS_ACUITY_SCORE: 63
ADLS_ACUITY_SCORE: 59
ADLS_ACUITY_SCORE: 59
ADLS_ACUITY_SCORE: 63
ADLS_ACUITY_SCORE: 56
ADLS_ACUITY_SCORE: 63
ADLS_ACUITY_SCORE: 59
ADLS_ACUITY_SCORE: 63
ADLS_ACUITY_SCORE: 59
ADLS_ACUITY_SCORE: 63
ADLS_ACUITY_SCORE: 59
ADLS_ACUITY_SCORE: 59
ADLS_ACUITY_SCORE: 63
ADLS_ACUITY_SCORE: 56

## 2024-12-13 NOTE — CONSULTS
Care Management Initial Consult    General Information  Assessment completed with: Patient, Patient  Type of CM/SW Visit: Initial Assessment    Primary Care Provider verified and updated as needed: Yes   Readmission within the last 30 days: no previous admission in last 30 days      Reason for Consult: discharge planning  Advance Care Planning: Advance Care Planning Reviewed: present on chart          Communication Assessment  Patient's communication style: spoken language (English or Bilingual)    Hearing Difficulty or Deaf: yes   Wear Glasses or Blind: yes    Cognitive  Cognitive/Neuro/Behavioral: WDL  Level of Consciousness: alert  Arousal Level: opens eyes spontaneously  Orientation: oriented x 4        Speech: clear    Living Environment:   People in home: alone     Current living Arrangements: independent living facility      Able to return to prior arrangements: yes       Family/Social Support:  Care provided by: self  Provides care for:       Support system:            Description of Support System:           Current Resources:   Patient receiving home care services:          Community Resources:    Equipment currently used at home: cane, straight, shower chair, walker, rolling  Supplies currently used at home:      Employment/Financial:  Employment Status: retired        Financial Concerns:                 Lifestyle & Psychosocial Needs:  Social Drivers of Health     Food Insecurity: Low Risk  (12/11/2024)    Food Insecurity     Within the past 12 months, did you worry that your food would run out before you got money to buy more?: No     Within the past 12 months, did the food you bought just not last and you didn t have money to get more?: No   Depression: Not at risk (9/9/2024)    PHQ-2     PHQ-2 Score: 1   Housing Stability: Low Risk  (12/11/2024)    Housing Stability     Do you have housing? : Yes     Are you worried about losing your housing?: No   Recent Concern: Housing Stability - High Risk  (10/12/2024)    Housing Stability     Do you have housing? : No     Are you worried about losing your housing?: No   Tobacco Use: Medium Risk (12/9/2024)    Patient History     Smoking Tobacco Use: Former     Smokeless Tobacco Use: Never     Passive Exposure: Not on file   Financial Resource Strain: Low Risk  (12/11/2024)    Financial Resource Strain     Within the past 12 months, have you or your family members you live with been unable to get utilities (heat, electricity) when it was really needed?: No   Alcohol Use: Not At Risk (11/9/2023)    Received from [facility]    AUDIT-C     Frequency of Alcohol Consumption: Never     Average Number of Drinks: Patient does not drink     Frequency of Binge Drinking: Never   Transportation Needs: Low Risk  (12/11/2024)    Transportation Needs     Within the past 12 months, has lack of transportation kept you from medical appointments, getting your medicines, non-medical meetings or appointments, work, or from getting things that you need?: No   Physical Activity: Insufficiently Active (11/9/2023)    Received from [facility]    Exercise Vital Sign     Days of Exercise per Week: 5 days     Minutes of Exercise per Session: 10 min   Interpersonal Safety: Low Risk  (12/11/2024)    Interpersonal Safety     Do you feel physically and emotionally safe where you currently live?: Yes     Within the past 12 months, have you been hit, slapped, kicked or otherwise physically hurt by someone?: No     Within the past 12 months, have you been humiliated or emotionally abused in other ways by your partner or ex-partner?: No   Stress: No Stress Concern Present (11/9/2023)    Received from [facility]    [place] citizen [place] [employer] - Occupational Stress Questionnaire     Feeling of Stress : Only a little   Social Connections: Feeling Socially Integrated (12/20/2023)    Received from [facility]    OASIS : Social Isolation     Frequency of experiencing loneliness or  isolation: Never   Health Literacy: Not on file       Functional Status:  Prior to admission patient needed assistance:   Dependent ADLs:: Ambulation-cane  Dependent IADLs:: Cooking, Meal Preparation       Mental Health Status:          Chemical Dependency Status:                Values/Beliefs:  Spiritual, Cultural Beliefs, Rastafari Practices, Values that affect care:                 Discussed  Partnership in Safe Discharge Planning  document with patient/family: No    Additional Information:  SW met with patient to discuss discharge planning. Patient reports that she lives at [facility] and does not receive services from facility. Patient reports that she ambulates with a cane. Patient reports that she does not like the PCP provider at the facility and is choosing to explore other options. Patient reports that she has a PCP appointment scheduled in January. SW made a homecare referral to [facility] for PT. Patient reports that her children will be able to assist with transportation upon discharge.    Next Steps: Continue to follow for discharge planning.    Connie FINN, [facility]  Inpatient Care Coordination  Emergency Room   [phone]

## 2024-12-13 NOTE — PROGRESS NOTES
[facility]    Medicine Progress Note - Hospitalist Service    Date of Admission:  12/10/2024    Assessment & Plan   Luz Thompson is a 74 year old female admitted on 12/10/2024. 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  She presented to emergency room with fevers, chills, dysuria, and left flank pain.     She was hemodynamically stable on arrival.  WBC count of 15.3 with UA showing more than 182 WBCs and large leukocyte esterase with negative nitrite.  He tested negative for COVID-19, influenza A/B and RSV.    CT abdomen showed left colonic diverticula with small amount of fluid adjacent to the sigmoid colon possibly secondary to diverticulitis.     Is not having urinary retention and needing straight cath, this morning had some greenish pus coming out with straight cath.      Sepsis due to UTI, suspected pyelonephritis.  Given history of ESBL and VRE, was started on meropenem.  Blood cultures are negative but urine cultures are growing Klebsiella more than 100,000 CFU and is not ESBL.  Patient has multiple allergies listed, had a skin allergy test in 2019 which did not yield an allergic reaction as was thought to be lower risk.  Stopped meropenem on 12/13 and will start ciprofloxacin and make sure she tolerates it.   Given pus on straight cath, urology was consulted.  No bladder abscess was seen on CT. as per urology there could have been a diverticulum.  Will need outpatient cystoscopy versus pelvic MRI due to recurrent UTI.  Urinary retention, initially needed straight cath but now urinating  "spontaneously.    Diverticulosis with diverticulitis.  CT showed left colonic diverticula with small amount of fluid adjacent to the sigmoid colon, secondary to acute diverticulitis. Patient did not have any air in the bladder to suggest colovesical fistula.  Antibiotics as above.    Right pleural thickening  Noted on CT and patient has some discomfort there.  Advised patient to follow-up with her primary doctor for further workup once acute illness is resolved.  Suggested to be consistent with asbestos exposure but patient denies at.    Chronic medical conditions  Primary biliary cirrhosis: History of previous liver transplant.  Continue ursodiol, prednisone 10 mg daily and sirolimus.  Patient had initial bump in LFTs but now improving.  CKD stage IIIb: Continue at baseline.  Paroxysmal atrial fibrillation: Resume metoprolol and apixaban.  Drug-induced coagulation defect.  Essential hypertension: Hydralazine and Lasix held due to low blood pressure.  Diabetes mellitus type 2: On sliding scale insulin.  Hypothyroidism: Continue Synthroid.  Hyperlipidemia.  On ezetimibe and Repatha  Anemia.  At about baseline with slow downtrend.    Family communication.  Discussed with patient at bedside.  Updated patient's daughter over the phone.    Disposition  Will likely discharge home tomorrow            Diet: Combination Diet Regular Diet Adult    DVT Prophylaxis: DOAC  Ron Catheter: Not present  Lines: None     Cardiac Monitoring: None  Code Status: Full Code      Clinically Significant Risk Factors               # Hypoalbuminemia: Lowest albumin = 2.8 g/dL at 12/13/2024 11:06 AM, will monitor as appropriate       # Hypertension: Noted on problem list           # DMII: A1C = 6.9 % (Ref range: <5.7 %) within past 6 months, PRESENT ON ADMISSION  # Overweight: Estimated body mass index is 27 kg/m  as calculated from the following:    Height as of this encounter: 1.715 m (5' 7.5\").    Weight as of this encounter: 79.4 kg (175 " lb)., PRESENT ON ADMISSION            Social Drivers of Health    Housing Stability: Low Risk  (12/11/2024)    Housing Stability     Do you have housing? : Yes     Are you worried about losing your housing?: No   Recent Concern: Housing Stability - High Risk (10/12/2024)    Housing Stability     Do you have housing? : No     Are you worried about losing your housing?: No   Tobacco Use: Medium Risk (12/9/2024)    Patient History     Smoking Tobacco Use: Former     Smokeless Tobacco Use: Never   Physical Activity: Insufficiently Active (11/9/2023)    Received from [facility]    Exercise Vital Sign     Days of Exercise per Week: 5 days     Minutes of Exercise per Session: 10 min          Disposition Plan     Medically Ready for Discharge: Anticipated Tomorrow             Lius Miguel Nielson MD  Hospitalist Service  [place]  Securely message with [employer] (more info)  Text page via [employer] Paging/Directory   ______________________________________________________________________    Interval History   Urinary retention resolved.  Complains of some discomfort on the right chest.    Physical Exam   Vital Signs: Temp: 97.9  F (36.6  C) Temp src: Oral BP: 104/47 Pulse: 68   Resp: 20 SpO2: 92 % O2 Device: None (Room air)    Weight: 175 lbs 0 oz    General Appearance: Alert awake and oriented x 3  Respiratory: Clear to auscultation  Cardiovascular: S1-S2 normal  GI: Soft.  Left flank tenderness improved.  No suprapubic tenderness.  Patient is very ticklish at baseline.  Skin: No rash  Other: No edema      Medical Decision Making       MANAGEMENT DISCUSSED with the following over the past 24 hours: Patient and RN       Data     I have personally reviewed the following data over the past 24 hrs:    10.3  \   8.4 (L)   / 263     135 104 40.4 (H) /  90   4.0 20 (L) 1.55 (H) \     ALT: 71 (H) AST: 28 AP: 98 TBILI: 0.3   ALB: 2.8 (L) TOT PROTEIN: 5.9 (L) LIPASE: N/A       Imaging results reviewed over the past 24 hrs:    No results found for this or any previous visit (from the past 24 hours).

## 2024-12-13 NOTE — PROGRESS NOTES
UROLOGY CHART CHECK--Patient not seen.    Leukocytosis has resolved, WBC 10.3 (13.8).  Creatinine at patient's relative baseline at 1.55 EGFR 35.  Urine culture shows greater than 100,000 CFU per mL of Klebsiella pneumoniae.  Transition to Cipro.  Does not appear to have needed any additional straight catheterizations.    -If patient requires additional catheterization, recommend Ron catheter placement.  -Continue with antibiotics for 10 to 14 days total.  -Recommend continuing with follow-up as scheduled with infectious disease and Dr. Gutierrez in January.  -Dr. Gutierrez may want additional imaging in the future such as MRI of the pelvis or repeat cystoscopy.  Patient endorses having a cystoscopy in January in [place], which was normal.  -Continue with bowel regimen.  -Okay to discharge from urology perspective on antibiotics.  Will plan on signing off.  Please contact us with any additional urological concerns.    Cherie Mujica PA-C  [facility]   [phone]

## 2024-12-13 NOTE — PLAN OF CARE
"End of Shift Summary  For vital signs and complete assessments, please see documentation flowsheets.     Pertinent assessments: A&O. VSS on RA. LS clear- denies SOB. BS active- denies nausea. BM this shift. Purewick in place overnight. Some trouble with hesitancy when voiding. Up Ax1 to bathroom with cane. PRN PO dilaudid given for pain. PIV SL. , 136    Major Shift Events: uneventful    Treatment Plan: symptom management, merrem, monitor BG    Bedside Nurse: Cristina Guerin RN       Problem: Adult Inpatient Plan of Care  Goal: Plan of Care Review  Description: The Plan of Care Review/Shift note should be completed every shift.  The Outcome Evaluation is a brief statement about your assessment that the patient is improving, declining, or no change.  This information will be displayed automatically on your shift  note.  Outcome: Progressing  Flowsheets (Taken 12/13/2024 2952)  Outcome Evaluation: VSS on RA. Purewick in place overnight with good output. Denies pain/nausea/SOB. Slept well  Plan of Care Reviewed With: patient  Overall Patient Progress: no change  Goal: Patient-Specific Goal (Individualized)  Description: You can add care plan individualizations to a care plan. Examples of Individualization might be:  \"Parent requests to be called daily at 9am for status\", \"I have a hard time hearing out of my right ear\", or \"Do not touch me to wake me up as it startles  me\".  Outcome: Progressing  Goal: Absence of Hospital-Acquired Illness or Injury  Outcome: Progressing  Intervention: Identify and Manage Fall Risk  Recent Flowsheet Documentation  Taken 12/12/2024 2211 by Cristina Guerin RN  Safety Promotion/Fall Prevention:   supervised activity   safety round/check completed   room organization consistent   room near nurse's station   patient and family education   nonskid shoes/slippers when out of bed  Intervention: Prevent Skin Injury  Recent Flowsheet Documentation  Taken 12/12/2024 2043 by Cristina Guerin, " RN  Body Position: position changed independently  Intervention: Prevent and Manage VTE (Venous Thromboembolism) Risk  Recent Flowsheet Documentation  Taken 12/12/2024 2211 by Cristina Guerin RN  VTE Prevention/Management: SCDs off (sequential compression devices)  Intervention: Prevent Infection  Recent Flowsheet Documentation  Taken 12/12/2024 2211 by Cristina Guerin RN  Infection Prevention:   single patient room provided   rest/sleep promoted   personal protective equipment utilized   hand hygiene promoted   cohorting utilized  Goal: Optimal Comfort and Wellbeing  Outcome: Progressing  Goal: Readiness for Transition of Care  Outcome: Progressing     Problem: UTI (Urinary Tract Infection)  Goal: Improved Infection Symptoms  Outcome: Progressing       Goal Outcome Evaluation:      Plan of Care Reviewed With: patient    Overall Patient Progress: no changeOverall Patient Progress: no change    Outcome Evaluation: VSS on RA. Purewick in place overnight with good output. Denies pain/nausea/SOB. Slept well

## 2024-12-13 NOTE — PLAN OF CARE
Goal Outcome Evaluation:      Plan of Care Reviewed With: patient    Overall Patient Progress: improvingOverall Patient Progress: improving    Outcome Evaluation: SW met with patient to discuss discharge planning.

## 2024-12-13 NOTE — PLAN OF CARE
"Pertinent assessments: A&O, up sba gait belt and cane in room. Encouraged pt to use bathroom rather than purewick, will  pass along. Ate well, pt denies pain sob or nausea.     Major Shift Events BS 95/95/170, No sliding scale given. Tele SR 69.     Treatment Plan: Cont abx. Symptom management.     Bedside Nurse: Mary Anne Moss RN       Problem: Adult Inpatient Plan of Care  Goal: Plan of Care Review  Description: The Plan of Care Review/Shift note should be completed every shift.  The Outcome Evaluation is a brief statement about your assessment that the patient is improving, declining, or no change.  This information will be displayed automatically on your shift  note.  Outcome: Progressing  Flowsheets (Taken 12/12/2024 1902)  Outcome Evaluation: Treatment Plan: Cont abx. Symptom management.  Plan of Care Reviewed With: patient  Overall Patient Progress: no change  Goal: Patient-Specific Goal (Individualized)  Description: You can add care plan individualizations to a care plan. Examples of Individualization might be:  \"Parent requests to be called daily at 9am for status\", \"I have a hard time hearing out of my right ear\", or \"Do not touch me to wake me up as it startles  me\".  Outcome: Progressing  Goal: Absence of Hospital-Acquired Illness or Injury  Outcome: Progressing  Intervention: Prevent Infection  Recent Flowsheet Documentation  Taken 12/12/2024 1100 by Mary Anne Moss, RN  Infection Prevention: rest/sleep promoted  Goal: Optimal Comfort and Wellbeing  Outcome: Progressing  Goal: Readiness for Transition of Care  Outcome: Progressing     Problem: UTI (Urinary Tract Infection)  Goal: Improved Infection Symptoms  Outcome: Progressing   Goal Outcome Evaluation:      Plan of Care Reviewed With: patient    Overall Patient Progress: no changeOverall Patient Progress: no change    Outcome Evaluation: Treatment Plan: Cont abx. Symptom management.      "

## 2024-12-13 NOTE — PLAN OF CARE
"Pertinent assessments: A&O, BP soft metoprolol held. Using purewick overnight, not to use during the day. Endorsed worsening R flank pain, IV and oral dilaudid given and eventually effective.     Major Shift Events  and 90, no sliding scale given.     Treatment Plan: Transition to oral abx, hopeful to discharge to assisted living tomorrow.     Bedside Nurse: Mary Anne Moss RN     Problem: Adult Inpatient Plan of Care  Goal: Plan of Care Review  Description: The Plan of Care Review/Shift note should be completed every shift.  The Outcome Evaluation is a brief statement about your assessment that the patient is improving, declining, or no change.  This information will be displayed automatically on your shift  note.  Outcome: Progressing  Flowsheets (Taken 12/13/2024 1454)  Outcome Evaluation: Treatment Plan: Transition to oral abx, hopeful to discharge to assisted living tomorrow.  Plan of Care Reviewed With: patient  Overall Patient Progress: no change  Goal: Patient-Specific Goal (Individualized)  Description: You can add care plan individualizations to a care plan. Examples of Individualization might be:  \"Parent requests to be called daily at 9am for status\", \"I have a hard time hearing out of my right ear\", or \"Do not touch me to wake me up as it startles  me\".  Outcome: Progressing  Goal: Absence of Hospital-Acquired Illness or Injury  Outcome: Progressing  Intervention: Identify and Manage Fall Risk  Recent Flowsheet Documentation  Taken 12/13/2024 1100 by Mary Anne Moss RN  Safety Promotion/Fall Prevention:   activity supervised   clutter free environment maintained   increased rounding and observation   increase visualization of patient   nonskid shoes/slippers when out of bed  Intervention: Prevent and Manage VTE (Venous Thromboembolism) Risk  Recent Flowsheet Documentation  Taken 12/13/2024 1100 by Mary Anne Moss RN  VTE Prevention/Management: SCDs off (sequential compression " devices)  Intervention: Prevent Infection  Recent Flowsheet Documentation  Taken 12/13/2024 1100 by Mary Anne Moss, RN  Infection Prevention:   single patient room provided   rest/sleep promoted  Goal: Optimal Comfort and Wellbeing  Outcome: Progressing  Goal: Readiness for Transition of Care  Outcome: Progressing     Problem: UTI (Urinary Tract Infection)  Goal: Improved Infection Symptoms  Outcome: Progressing   Goal Outcome Evaluation:      Plan of Care Reviewed With: patient    Overall Patient Progress: no changeOverall Patient Progress: no change    Outcome Evaluation: Treatment Plan: Cont abx. Symptom management.

## 2024-12-14 LAB
ALBUMIN SERPL BCG-MCNC: 2.8 G/DL (ref 3.5–5.2)
ALP SERPL-CCNC: 106 U/L (ref 40–150)
ALT SERPL W P-5'-P-CCNC: 66 U/L (ref 0–50)
ANION GAP SERPL CALCULATED.3IONS-SCNC: 12 MMOL/L (ref 7–15)
AST SERPL W P-5'-P-CCNC: 31 U/L (ref 0–45)
BASOPHILS # BLD AUTO: 0 10E3/UL (ref 0–0.2)
BASOPHILS NFR BLD AUTO: 0 %
BILIRUB DIRECT SERPL-MCNC: <0.2 MG/DL (ref 0–0.3)
BILIRUB SERPL-MCNC: <0.2 MG/DL
BUN SERPL-MCNC: 38.9 MG/DL (ref 8–23)
CALCIUM SERPL-MCNC: 8.4 MG/DL (ref 8.8–10.4)
CHLORIDE SERPL-SCNC: 108 MMOL/L (ref 98–107)
CREAT SERPL-MCNC: 1.45 MG/DL (ref 0.51–0.95)
EGFRCR SERPLBLD CKD-EPI 2021: 37 ML/MIN/1.73M2
EOSINOPHIL # BLD AUTO: 0.1 10E3/UL (ref 0–0.7)
EOSINOPHIL NFR BLD AUTO: 1 %
ERYTHROCYTE [DISTWIDTH] IN BLOOD BY AUTOMATED COUNT: 15.6 % (ref 10–15)
GLUCOSE BLDC GLUCOMTR-MCNC: 120 MG/DL (ref 70–99)
GLUCOSE BLDC GLUCOMTR-MCNC: 131 MG/DL (ref 70–99)
GLUCOSE BLDC GLUCOMTR-MCNC: 172 MG/DL (ref 70–99)
GLUCOSE BLDC GLUCOMTR-MCNC: 181 MG/DL (ref 70–99)
GLUCOSE BLDC GLUCOMTR-MCNC: 187 MG/DL (ref 70–99)
GLUCOSE SERPL-MCNC: 123 MG/DL (ref 70–99)
HCO3 SERPL-SCNC: 19 MMOL/L (ref 22–29)
HCT VFR BLD AUTO: 25.4 % (ref 35–47)
HGB BLD-MCNC: 7.9 G/DL (ref 11.7–15.7)
IMM GRANULOCYTES # BLD: 0.2 10E3/UL
IMM GRANULOCYTES NFR BLD: 2 %
LYMPHOCYTES # BLD AUTO: 1.6 10E3/UL (ref 0.8–5.3)
LYMPHOCYTES NFR BLD AUTO: 19 %
MCH RBC QN AUTO: 28.5 PG (ref 26.5–33)
MCHC RBC AUTO-ENTMCNC: 31.1 G/DL (ref 31.5–36.5)
MCV RBC AUTO: 92 FL (ref 78–100)
MONOCYTES # BLD AUTO: 1 10E3/UL (ref 0–1.3)
MONOCYTES NFR BLD AUTO: 12 %
NEUTROPHILS # BLD AUTO: 5.9 10E3/UL (ref 1.6–8.3)
NEUTROPHILS NFR BLD AUTO: 67 %
NRBC # BLD AUTO: 0 10E3/UL
NRBC BLD AUTO-RTO: 0 /100
PLATELET # BLD AUTO: 291 10E3/UL (ref 150–450)
POTASSIUM SERPL-SCNC: 3.9 MMOL/L (ref 3.4–5.3)
PROT SERPL-MCNC: 5.4 G/DL (ref 6.4–8.3)
RBC # BLD AUTO: 2.77 10E6/UL (ref 3.8–5.2)
SODIUM SERPL-SCNC: 139 MMOL/L (ref 135–145)
WBC # BLD AUTO: 8.9 10E3/UL (ref 4–11)

## 2024-12-14 PROCEDURE — 120N000001 HC R&B MED SURG/OB

## 2024-12-14 PROCEDURE — 85004 AUTOMATED DIFF WBC COUNT: CPT | Performed by: STUDENT IN AN ORGANIZED HEALTH CARE EDUCATION/TRAINING PROGRAM

## 2024-12-14 PROCEDURE — 36415 COLL VENOUS BLD VENIPUNCTURE: CPT | Performed by: STUDENT IN AN ORGANIZED HEALTH CARE EDUCATION/TRAINING PROGRAM

## 2024-12-14 PROCEDURE — 250N000013 HC RX MED GY IP 250 OP 250 PS 637: Performed by: INTERNAL MEDICINE

## 2024-12-14 PROCEDURE — 82248 BILIRUBIN DIRECT: CPT | Performed by: STUDENT IN AN ORGANIZED HEALTH CARE EDUCATION/TRAINING PROGRAM

## 2024-12-14 PROCEDURE — 250N000012 HC RX MED GY IP 250 OP 636 PS 637: Performed by: INTERNAL MEDICINE

## 2024-12-14 PROCEDURE — 85041 AUTOMATED RBC COUNT: CPT | Performed by: STUDENT IN AN ORGANIZED HEALTH CARE EDUCATION/TRAINING PROGRAM

## 2024-12-14 PROCEDURE — 80048 BASIC METABOLIC PNL TOTAL CA: CPT | Performed by: STUDENT IN AN ORGANIZED HEALTH CARE EDUCATION/TRAINING PROGRAM

## 2024-12-14 PROCEDURE — 250N000013 HC RX MED GY IP 250 OP 250 PS 637: Performed by: STUDENT IN AN ORGANIZED HEALTH CARE EDUCATION/TRAINING PROGRAM

## 2024-12-14 PROCEDURE — 99232 SBSQ HOSP IP/OBS MODERATE 35: CPT | Performed by: STUDENT IN AN ORGANIZED HEALTH CARE EDUCATION/TRAINING PROGRAM

## 2024-12-14 PROCEDURE — 82435 ASSAY OF BLOOD CHLORIDE: CPT | Performed by: STUDENT IN AN ORGANIZED HEALTH CARE EDUCATION/TRAINING PROGRAM

## 2024-12-14 PROCEDURE — 84075 ASSAY ALKALINE PHOSPHATASE: CPT | Performed by: STUDENT IN AN ORGANIZED HEALTH CARE EDUCATION/TRAINING PROGRAM

## 2024-12-14 RX ORDER — CIPROFLOXACIN 500 MG/1
500 TABLET, FILM COATED ORAL EVERY 12 HOURS
Qty: 12 TABLET | Refills: 0 | Status: SHIPPED | OUTPATIENT
Start: 2024-12-14 | End: 2024-12-15

## 2024-12-14 RX ORDER — CEFDINIR 250 MG/5ML
300 POWDER, FOR SUSPENSION ORAL 2 TIMES DAILY
Status: DISCONTINUED | OUTPATIENT
Start: 2024-12-14 | End: 2024-12-15 | Stop reason: HOSPADM

## 2024-12-14 RX ORDER — DIPHENHYDRAMINE HCL 12.5MG/5ML
50 LIQUID (ML) ORAL EVERY 6 HOURS PRN
Status: DISCONTINUED | OUTPATIENT
Start: 2024-12-14 | End: 2024-12-15 | Stop reason: HOSPADM

## 2024-12-14 RX ADMIN — EZETIMIBE 10 MG: 10 TABLET ORAL at 09:06

## 2024-12-14 RX ADMIN — URSODIOL 250 MG: 250 TABLET ORAL at 09:15

## 2024-12-14 RX ADMIN — SITAGLIPTIN 50 MG: 50 TABLET, FILM COATED ORAL at 09:05

## 2024-12-14 RX ADMIN — URSODIOL 250 MG: 250 TABLET ORAL at 21:15

## 2024-12-14 RX ADMIN — LEVOTHYROXINE SODIUM 175 MCG: 0.12 TABLET ORAL at 06:45

## 2024-12-14 RX ADMIN — SIROLIMUS 1 MG: 0.5 TABLET ORAL at 09:09

## 2024-12-14 RX ADMIN — METOPROLOL SUCCINATE 25 MG: 25 TABLET, EXTENDED RELEASE ORAL at 09:06

## 2024-12-14 RX ADMIN — APIXABAN 2.5 MG: 2.5 TABLET, FILM COATED ORAL at 21:15

## 2024-12-14 RX ADMIN — APIXABAN 2.5 MG: 2.5 TABLET, FILM COATED ORAL at 09:05

## 2024-12-14 RX ADMIN — FOLIC ACID 1 MG: 1 TABLET ORAL at 09:06

## 2024-12-14 RX ADMIN — DIPHENHYDRAMINE HYDROCHLORIDE 50 MG: 25 SOLUTION ORAL at 22:53

## 2024-12-14 RX ADMIN — DIPHENHYDRAMINE HYDROCHLORIDE 50 MG: 25 SOLUTION ORAL at 14:02

## 2024-12-14 RX ADMIN — FERROUS SULFATE TAB 325 MG (65 MG ELEMENTAL FE) 325 MG: 325 (65 FE) TAB at 17:47

## 2024-12-14 RX ADMIN — CALCITRIOL CAPSULES 0.25 MCG 0.25 MCG: 0.25 CAPSULE ORAL at 09:06

## 2024-12-14 RX ADMIN — PREDNISONE 10 MG: 10 TABLET ORAL at 09:06

## 2024-12-14 RX ADMIN — CEFDINIR 300 MG: 250 POWDER, FOR SUSPENSION ORAL at 12:43

## 2024-12-14 RX ADMIN — METOPROLOL SUCCINATE 25 MG: 25 TABLET, EXTENDED RELEASE ORAL at 21:15

## 2024-12-14 RX ADMIN — CEFDINIR 300 MG: 250 POWDER, FOR SUSPENSION ORAL at 22:13

## 2024-12-14 RX ADMIN — Medication 1 PACKET: at 09:08

## 2024-12-14 RX ADMIN — GABAPENTIN 300 MG: 250 SUSPENSION ORAL at 21:15

## 2024-12-14 ASSESSMENT — ACTIVITIES OF DAILY LIVING (ADL)
ADLS_ACUITY_SCORE: 59
ADLS_ACUITY_SCORE: 63
ADLS_ACUITY_SCORE: 63
ADLS_ACUITY_SCORE: 59
ADLS_ACUITY_SCORE: 59
ADLS_ACUITY_SCORE: 63
ADLS_ACUITY_SCORE: 63
ADLS_ACUITY_SCORE: 59
ADLS_ACUITY_SCORE: 63
ADLS_ACUITY_SCORE: 63
ADLS_ACUITY_SCORE: 60
ADLS_ACUITY_SCORE: 63
ADLS_ACUITY_SCORE: 63
ADLS_ACUITY_SCORE: 59
ADLS_ACUITY_SCORE: 63
ADLS_ACUITY_SCORE: 63
ADLS_ACUITY_SCORE: 60
ADLS_ACUITY_SCORE: 59
ADLS_ACUITY_SCORE: 63
ADLS_ACUITY_SCORE: 60
ADLS_ACUITY_SCORE: 59

## 2024-12-14 NOTE — DISCHARGE INSTRUCTIONS
[facility] [phone] [email or web address]    [facility] Link [phone] [email or web address]    Senior Linkage Line [phone] [email or web address]    [facility] resources contact information: [facility]- Phone: [phone]; [email or web address], [email or web address]

## 2024-12-14 NOTE — PLAN OF CARE
Physical Therapy Discharge Summary    Reason for therapy discharge:    Discharged to home with home therapy.    Progress towards therapy goal(s). See goals on Care Plan in [facility] electronic health record for goal details.  Goals not met.  Barriers to achieving goals:   discharge from facility.    Therapy recommendation(s):    Continued therapy is recommended.  Rationale/Recommendations:  Home PT and assist recommended by evaluating therapist.

## 2024-12-14 NOTE — PROGRESS NOTES
Care Management Discharge Note    Discharge Date: 12/14/2024       Discharge Disposition:  home, PT  Discharge Services:  PT  Discharge Transportation: family or friend will provide    Education Provided on the Discharge Plan:  yes  Persons Notified of Discharge Plans: patient, hospitalist      Additional Information:  Discharge order in place and signed today.     Patient is a resident at [facility]. PT recs home w assist and HH PT. Per chart review a referral for patient's in-house PT provider has been made.     CM spoke w patient who declined home care PT, stated she wants to do OP PT w [facility], but that this is also offered through her facility's in-house PT provider. Patient stated she already has  referral for this from her PCP and has done it before. CM requested a referral for this from hospitalist if possible as well.     Transportation resources added to AVS.     Patient states her son will provide discharge transportation.     Millicent Iqbal RN, BSN  Inpatient Care Coordination  [facility]  [phone]

## 2024-12-14 NOTE — PROGRESS NOTES
[facility]    Medicine Progress Note - Hospitalist Service    Date of Admission:  12/10/2024    Assessment & Plan   Luz Thompson is a 74 year old female admitted on 12/10/2024. 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  She presented to emergency room with fevers, chills, dysuria, and left flank pain.     She was hemodynamically stable on arrival.  WBC count of 15.3 with UA showing more than 182 WBCs and large leukocyte esterase with negative nitrite.  He tested negative for COVID-19, influenza A/B and RSV.    CT abdomen showed left colonic diverticula with small amount of fluid adjacent to the sigmoid colon possibly secondary to diverticulitis.     Is not having urinary retention and needing straight cath, this morning had some greenish pus coming out with straight cath.      Sepsis due to UTI, suspected pyelonephritis.  Highly questionable allergic reactions.  Given history of ESBL and VRE, was started on meropenem.  Blood cultures are negative but urine cultures are growing Klebsiella more than 100,000 CFU and is not ESBL.  Patient has multiple allergies listed, had a skin allergy test in 2019 which did not yield an allergic reaction as was thought to be lower risk.  Stopped meropenem on 12/13 and given a dose of ciprofloxacin on the evening of 12/13 after which patient complained of lip tingling but no obvious lip or oral swelling, rash, hives, shortness of breath etc were noted.  Patient was given Benadryl on her request and she refuses to take ciprofloxacin again even though I do not think it was very allergic  "reaction.  Patient was given Omnicef solution today as she says she is allergic to the capsule and again complained of numbness and tingling in her lips without any objective findings.  I held off on giving Benadryl without any worsening of symptoms but RN eventually relented and gave her Benadryl.  I feel safe discharging patient home but she feels uncomfortable going home without taking another dose of Omnicef and making sure she does not develop a reaction.  I think her \"allergic reaction\" to ciprofloxacin and Omnicef is psychological.  Given pus on straight cath, urology was consulted.  No bladder abscess was seen on CT. as per urology there could have been a diverticulum.  Will need outpatient cystoscopy versus pelvic MRI due to recurrent UTI.  Urinary retention, initially needed straight cath but now urinating spontaneously.    Diverticulosis with diverticulitis.  CT showed left colonic diverticula with small amount of fluid adjacent to the sigmoid colon, secondary to acute diverticulitis. Patient did not have any air in the bladder to suggest colovesical fistula.  Antibiotics as above.    Right pleural thickening  Noted on CT and patient has some discomfort there.  Advised patient to follow-up with her primary doctor for further workup once acute illness is resolved.  Suggested to be consistent with asbestos exposure but patient denies at.    Chronic medical conditions  Primary biliary cirrhosis: History of previous liver transplant.  Continue ursodiol, prednisone 10 mg daily and sirolimus.  Patient had initial bump in LFTs but now improving.  CKD stage IIIb: Continue at baseline.  Paroxysmal atrial fibrillation: Resume metoprolol and apixaban.  Drug-induced coagulation defect.  Essential hypertension: Hydralazine and Lasix held due to low blood pressure.  Diabetes mellitus type 2: On sliding scale insulin.  Hypothyroidism: Continue Synthroid.  Hyperlipidemia.  On ezetimibe and Repatha  Anemia.  At about " "baseline with slow downtrend.    Family communication.  Discussed with patient at bedside.  Updated patient's daughter over the phone.    Disposition  Will likely discharge home tomorrow            Diet: Combination Diet Regular Diet Adult  Diet    DVT Prophylaxis: DOAC  Ron Catheter: Not present  Lines: None     Cardiac Monitoring: None  Code Status: Full Code      Clinically Significant Risk Factors          # Hyperchloremia: Highest Cl = 108 mmol/L in last 2 days, will monitor as appropriate          # Hypoalbuminemia: Lowest albumin = 2.8 g/dL at 12/14/2024  7:43 AM, will monitor as appropriate       # Hypertension: Noted on problem list           # DMII: A1C = 6.9 % (Ref range: <5.7 %) within past 6 months, PRESENT ON ADMISSION  # Overweight: Estimated body mass index is 27 kg/m  as calculated from the following:    Height as of this encounter: 1.715 m (5' 7.5\").    Weight as of this encounter: 79.4 kg (175 lb)., PRESENT ON ADMISSION            Social Drivers of Health    Housing Stability: Low Risk  (12/11/2024)    Housing Stability     Do you have housing? : Yes     Are you worried about losing your housing?: No   Recent Concern: Housing Stability - High Risk (10/12/2024)    Housing Stability     Do you have housing? : No     Are you worried about losing your housing?: No   Tobacco Use: Medium Risk (12/9/2024)    Patient History     Smoking Tobacco Use: Former     Smokeless Tobacco Use: Never   Physical Activity: Insufficiently Active (11/9/2023)    Received from [facility]    Exercise Vital Sign     Days of Exercise per Week: 5 days     Minutes of Exercise per Session: 10 min          Disposition Plan     Medically Ready for Discharge: Anticipated Tomorrow             Luis Miguel Nielson MD  Hospitalist Service  [facility]  Securely message with Zach[employer] (more info)  Text page via [facility] Paging/Directory   ______________________________________________________________________    Interval " History   Urinary retention resolved.  Complained of overall numbness after getting ciprofloxacin last evening and with Omnicef this morning without any objective allergic signs.    Physical Exam   Vital Signs: Temp: 98.6  F (37  C) Temp src: Oral BP: 108/54 Pulse: 64   Resp: 16 SpO2: 95 % O2 Device: None (Room air)    Weight: 175 lbs 0 oz    General Appearance: Alert awake and oriented x 3  Respiratory: Clear to auscultation  Cardiovascular: S1-S2 normal  GI: Soft.  Left flank tenderness improved.  No suprapubic tenderness.  Patient is very ticklish at baseline.  Skin: No rash  Other: No edema      Medical Decision Making       MANAGEMENT DISCUSSED with the following over the past 24 hours: Patient and RN       Data     I have personally reviewed the following data over the past 24 hrs:    8.9  \   7.9 (L)   / 291     139 108 (H) 38.9 (H) /  181 (H)   3.9 19 (L) 1.45 (H) \     ALT: 66 (H) AST: 31 AP: 106 TBILI: <0.2   ALB: 2.8 (L) TOT PROTEIN: 5.4 (L) LIPASE: N/A       Imaging results reviewed over the past 24 hrs:   No results found for this or any previous visit (from the past 24 hours).

## 2024-12-14 NOTE — PLAN OF CARE
"End of Shift Summary  For vital signs and complete assessments, please see documentation flowsheets.     Pertinent assessments: Alert and oriented x4, VSS, afebrile, room air, denies pain/nausea, Using purewick overnight, bowel sounds hypo, passing flatus, lung sounds clear,     Major Shift Events: gave first dose of oral Cipro, pt insisted on having benadryl on hand in case of reaction--order received, pt did complain of lip numbness with cipro and did take benadryl, states she will likely not continue with cipro, still complaining of pain with urination--encouraged pt to increase water intake     Treatment Plan: discharge back to assisted living today with home care      Bedside Nurse: Jacqui Harp RN       Problem: Adult Inpatient Plan of Care  Goal: Plan of Care Review  Description: The Plan of Care Review/Shift note should be completed every shift.  The Outcome Evaluation is a brief statement about your assessment that the patient is improving, declining, or no change.  This information will be displayed automatically on your shift  note.  Outcome: Progressing  Flowsheets (Taken 12/14/2024 0544)  Outcome Evaluation: possible discharge today  Plan of Care Reviewed With: patient  Overall Patient Progress: improving  Goal: Patient-Specific Goal (Individualized)  Description: You can add care plan individualizations to a care plan. Examples of Individualization might be:  \"Parent requests to be called daily at 9am for status\", \"I have a hard time hearing out of my right ear\", or \"Do not touch me to wake me up as it startles  me\".  Outcome: Progressing  Goal: Absence of Hospital-Acquired Illness or Injury  Outcome: Progressing  Intervention: Identify and Manage Fall Risk  Recent Flowsheet Documentation  Taken 12/13/2024 2050 by Jacqui Harp RN  Safety Promotion/Fall Prevention:   supervised activity   safety round/check completed   room organization consistent   room near nurse's station   patient and family " education   nonskid shoes/slippers when out of bed  Intervention: Prevent Skin Injury  Recent Flowsheet Documentation  Taken 12/13/2024 2050 by Jacqui Harp RN  Body Position: position changed independently  Intervention: Prevent and Manage VTE (Venous Thromboembolism) Risk  Recent Flowsheet Documentation  Taken 12/13/2024 2050 by Jacqui Harp RN  VTE Prevention/Management: SCDs off (sequential compression devices)  Intervention: Prevent Infection  Recent Flowsheet Documentation  Taken 12/13/2024 2050 by Jacqui Harp RN  Infection Prevention:   single patient room provided   rest/sleep promoted   personal protective equipment utilized   hand hygiene promoted   cohorting utilized  Goal: Optimal Comfort and Wellbeing  Outcome: Progressing  Goal: Readiness for Transition of Care  Outcome: Progressing   Goal Outcome Evaluation:      Plan of Care Reviewed With: patient    Overall Patient Progress: improvingOverall Patient Progress: improving    Outcome Evaluation: possible discharge today

## 2024-12-15 VITALS
HEART RATE: 64 BPM | SYSTOLIC BLOOD PRESSURE: 117 MMHG | RESPIRATION RATE: 18 BRPM | BODY MASS INDEX: 26.52 KG/M2 | DIASTOLIC BLOOD PRESSURE: 60 MMHG | OXYGEN SATURATION: 96 % | TEMPERATURE: 98.4 F | HEIGHT: 68 IN | WEIGHT: 175 LBS

## 2024-12-15 LAB
BACTERIA BLD CULT: NO GROWTH
BACTERIA BLD CULT: NO GROWTH
GLUCOSE BLDC GLUCOMTR-MCNC: 110 MG/DL (ref 70–99)
GLUCOSE BLDC GLUCOMTR-MCNC: 165 MG/DL (ref 70–99)

## 2024-12-15 PROCEDURE — 250N000013 HC RX MED GY IP 250 OP 250 PS 637: Performed by: INTERNAL MEDICINE

## 2024-12-15 PROCEDURE — 99239 HOSP IP/OBS DSCHRG MGMT >30: CPT | Performed by: STUDENT IN AN ORGANIZED HEALTH CARE EDUCATION/TRAINING PROGRAM

## 2024-12-15 PROCEDURE — 250N000013 HC RX MED GY IP 250 OP 250 PS 637: Performed by: STUDENT IN AN ORGANIZED HEALTH CARE EDUCATION/TRAINING PROGRAM

## 2024-12-15 PROCEDURE — 250N000012 HC RX MED GY IP 250 OP 636 PS 637: Performed by: INTERNAL MEDICINE

## 2024-12-15 RX ORDER — CEFDINIR 250 MG/5ML
300 POWDER, FOR SUSPENSION ORAL 2 TIMES DAILY
Qty: 60 ML | Refills: 0 | Status: SHIPPED | OUTPATIENT
Start: 2024-12-15 | End: 2024-12-20

## 2024-12-15 RX ORDER — EPINEPHRINE 0.3 MG/.3ML
0.3 INJECTION SUBCUTANEOUS PRN
Qty: 2 EACH | Refills: 1 | Status: SHIPPED | OUTPATIENT
Start: 2024-12-15

## 2024-12-15 RX ADMIN — URSODIOL 250 MG: 250 TABLET ORAL at 10:03

## 2024-12-15 RX ADMIN — APIXABAN 2.5 MG: 2.5 TABLET, FILM COATED ORAL at 10:03

## 2024-12-15 RX ADMIN — Medication 1 PACKET: at 10:04

## 2024-12-15 RX ADMIN — SIROLIMUS 1 MG: 0.5 TABLET ORAL at 10:03

## 2024-12-15 RX ADMIN — METOPROLOL SUCCINATE 25 MG: 25 TABLET, EXTENDED RELEASE ORAL at 10:03

## 2024-12-15 RX ADMIN — CEFDINIR 300 MG: 250 POWDER, FOR SUSPENSION ORAL at 10:03

## 2024-12-15 RX ADMIN — PREDNISONE 10 MG: 10 TABLET ORAL at 10:03

## 2024-12-15 RX ADMIN — LEVOTHYROXINE SODIUM 175 MCG: 0.12 TABLET ORAL at 06:28

## 2024-12-15 RX ADMIN — EZETIMIBE 10 MG: 10 TABLET ORAL at 10:03

## 2024-12-15 RX ADMIN — CALCITRIOL CAPSULES 0.25 MCG 0.25 MCG: 0.25 CAPSULE ORAL at 10:03

## 2024-12-15 RX ADMIN — SITAGLIPTIN 50 MG: 50 TABLET, FILM COATED ORAL at 10:03

## 2024-12-15 RX ADMIN — FOLIC ACID 1 MG: 1 TABLET ORAL at 10:03

## 2024-12-15 ASSESSMENT — ACTIVITIES OF DAILY LIVING (ADL)
ADLS_ACUITY_SCORE: 63
ADLS_ACUITY_SCORE: 63
ADLS_ACUITY_SCORE: 60
ADLS_ACUITY_SCORE: 63
ADLS_ACUITY_SCORE: 60
ADLS_ACUITY_SCORE: 60
ADLS_ACUITY_SCORE: 63
ADLS_ACUITY_SCORE: 63

## 2024-12-15 NOTE — PLAN OF CARE
Goal Outcome Evaluation:    Patient hospitalized for pyelonephritis. Cleared for discharge to home today per MD. Discharge instructions, medications, and follow-ups reviewed with patient and patients son in detail. Discharge medications, including omnicef and epi-pen were filled at [facility] discharge pharmacy. Patient and patients son verbalized understanding of discharge instructions. Belongings were returned to patient at time of discharge. Patients son providing transport home.     Pippa Rodriguez RN

## 2024-12-15 NOTE — PLAN OF CARE
"For vital signs and complete assessments, please see documentation flowsheets.     [id number]  Pertinent assessments: Pt somnolent. LIAM orientation. On RA. Ax1 with gait belt and cane. Afebrile. Elevated BP, other VSS. No signs of allergic reaction, pain, nausea, or SOB. PIV saline locked. B    Major Shift Events: none    Treatment Plan: Contact precautions. Oral Omnicef. Monitor BG. Expected discharge 12/15.    Bedside Nurse: Charanjit Tristan RN     Problem: Adult Inpatient Plan of Care  Goal: Plan of Care Review  Description: The Plan of Care Review/Shift note should be completed every shift.  The Outcome Evaluation is a brief statement about your assessment that the patient is improving, declining, or no change.  This information will be displayed automatically on your shift  note.  Outcome: Progressing  Flowsheets (Taken 12/15/2024 0512)  Outcome Evaluation: Pt slept throughout shift. No signs of pain. Expected discharge 12/15  Plan of Care Reviewed With: patient  Overall Patient Progress: no change  Goal: Patient-Specific Goal (Individualized)  Description: You can add care plan individualizations to a care plan. Examples of Individualization might be:  \"Parent requests to be called daily at 9am for status\", \"I have a hard time hearing out of my right ear\", or \"Do not touch me to wake me up as it startles  me\".  Outcome: Progressing  Goal: Absence of Hospital-Acquired Illness or Injury  Outcome: Progressing  Intervention: Identify and Manage Fall Risk  Recent Flowsheet Documentation  Taken 12/15/2024 0151 by Charanjit Tristan, RN  Safety Promotion/Fall Prevention:   activity supervised   assistive device/personal items within reach   clutter free environment maintained   increase visualization of patient   room near nurse's station   room organization consistent   safety round/check completed   supervised activity   treat reversible contributory factors  Intervention: Prevent Skin Injury  Recent Flowsheet " Documentation  Taken 12/15/2024 0151 by Charanjit Tristan RN  Body Position: position changed independently  Intervention: Prevent and Manage VTE (Venous Thromboembolism) Risk  Recent Flowsheet Documentation  Taken 12/15/2024 0151 by Charanjit Tristan RN  VTE Prevention/Management: SCDs off (sequential compression devices)  Intervention: Prevent Infection  Recent Flowsheet Documentation  Taken 12/15/2024 0151 by Charanjit Tristan, RN  Infection Prevention:   single patient room provided   rest/sleep promoted   cohorting utilized   hand hygiene promoted  Goal: Optimal Comfort and Wellbeing  Outcome: Progressing  Goal: Readiness for Transition of Care  Outcome: Progressing     Problem: UTI (Urinary Tract Infection)  Goal: Improved Infection Symptoms  Outcome: Progressing     Goal Outcome Evaluation:      Plan of Care Reviewed With: patient    Overall Patient Progress: no changeOverall Patient Progress: no change    Outcome Evaluation: Pt slept throughout shift. No signs of pain. Expected discharge 12/15

## 2024-12-15 NOTE — DISCHARGE SUMMARY
"[facility]  Hospitalist Discharge Summary      Date of Admission:  12/10/2024  Date of Discharge:  12/15/2024  Discharging Provider: Luis Miguel Nielson MD  Discharge Service: Hospitalist Service    Discharge Diagnoses     Sepsis secondary to UTI, suspected pyelonephritis.  Questionable allergic reactions to antibiotics.  Diverticulosis parotiditis.  Right pleural thickening.  Primary biliary cirrhosis with history of liver transplant.  CKD stage IIIb.  Paroxysmal atrial fibrillation.  Essential hypertension.  Diabetes mellitus type 2.  Hypothyroidism.  Hyperlipidemia.  Anemia.      Clinically Significant Risk Factors     # DMII: A1C = 6.9 % (Ref range: <5.7 %) within past 6 months  # Overweight: Estimated body mass index is 27 kg/m  as calculated from the following:    Height as of this encounter: 1.715 m (5' 7.5\").    Weight as of this encounter: 79.4 kg (175 lb).       Follow-ups Needed After Discharge   Follow-up Appointments       Follow-up and recommended labs and tests       Follow up with primary care provider, Physician No Ref-Primary, within 7 days for hospital follow- up.  The following labs/tests are recommended: CBC and BMP.            Follow-up on the right-sided pleural thickening seen on CT.    Unresulted Labs Ordered in the Past 30 Days of this Admission       Date and Time Order Name Status Description    12/10/2024  4:52 PM Blood Culture Hand, Right Preliminary     12/10/2024  4:52 PM Blood Culture Peripheral Blood Preliminary         These results will be followed up by hospitalist team    Discharge Disposition   Discharged to home  Condition at discharge: Stable    Hospital Course   Luz Thompson is a 74 year old female admitted on 12/10/2024. She has a past medical history significant for primary biliary cirrhosis status post previous liver transplant, chronic anemia, chronic kidney disease, anxiety, previous stroke, previous DVT, atrial fibrillation, diverticulosis, " glaucoma, hypertension, hyperlipidemia, recurrent urinary tract infections, migraines, osteoarthritis, hypothyroidism, thyroid cancer, Sjogren's syndrome, and diabetes mellitus type 2.  She presented to emergency room with fevers, chills, dysuria, and left flank pain.     She was hemodynamically stable on arrival.  WBC count of 15.3 with UA showing more than 182 WBCs and large leukocyte esterase with negative nitrite.  He tested negative for COVID-19, influenza A/B and RSV.    CT abdomen showed left colonic diverticula with small amount of fluid adjacent to the sigmoid colon possibly secondary to diverticulitis.     Is not having urinary retention and needing straight cath, this morning had some greenish pus coming out with straight cath.      Sepsis due to UTI, suspected pyelonephritis.  Highly questionable allergic reactions.  Given history of ESBL and VRE, was initially started on meropenem.  Blood cultures remained negative but urine cultures grew Klebsiella more than 100,000 CFU and is not ESBL.    Patient has multiple allergies listed, had a skin allergy test in 2019 which did not yield an allergic reaction as was thought to be lower risk.  Stopped meropenem on 12/13 and given a dose of ciprofloxacin on the evening of 12/13 after which patient complained of lip tingling but no obvious lip or oral swelling, rash, hives, shortness of breath etc were noted.  Patient was given Benadryl on her request and she refuses to take ciprofloxacin again even though I do not think it was very allergic reaction.  Patient was given Omnicef solution as she says she is allergic to the capsule and again complained of numbness and tingling in her lips without any objective findings.  I held off on giving Benadryl without any worsening of symptoms but RN eventually relented and gave her Benadryl.  I feel safe discharging patient home but she feels uncomfortable going home without taking another dose of Omnicef and making sure she  "does not develop a reaction.  I think her \"allergic reaction\" to ciprofloxacin and Omnicef is psychological and will be discharged on Omnicef.  Patient was given prescription for EpiPen on discharge given her reported history of anaphylaxis with penicillins.  Given pus on straight cath, urology was consulted.  No bladder abscess was seen on CT. as per urology there could have been a diverticulum.  Will need outpatient cystoscopy versus pelvic MRI due to recurrent UTI.  Urinary retention, initially needed straight cath but now urinating spontaneously.    Diverticulosis with diverticulitis.  CT showed left colonic diverticula with small amount of fluid adjacent to the sigmoid colon, secondary to acute diverticulitis. Patient did not have any air in the bladder to suggest colovesical fistula.  Antibiotics as above.    Right pleural thickening  Noted on CT and patient has some discomfort there.  Advised patient to follow-up with her primary doctor for further workup once acute illness is resolved.  Suggested to be consistent with asbestos exposure but patient denies at.    Chronic medical conditions  Primary biliary cirrhosis: History of previous liver transplant.  Continue ursodiol, prednisone 10 mg daily and sirolimus.  Patient had initial bump in LFTs but now improving.  CKD stage IIIb: Continue at baseline.  Paroxysmal atrial fibrillation: Resume metoprolol and apixaban.  Drug-induced coagulation defect.  Essential hypertension: Hydralazine and Lasix held due to low blood pressure.  Diabetes mellitus type 2: On sliding scale insulin.  Hypothyroidism: Continue Synthroid.  Hyperlipidemia.  On ezetimibe and Repatha  Anemia.  At about baseline with slow downtrend.    Family communication.  Discussed with patient at bedside.  Updated patient's daughter over the phone.    Disposition  Will likely discharge home tomorrow      Consultations This Hospital Stay   UROLOGY IP CONSULT  PHYSICAL THERAPY ADULT IP CONSULT  CARE " MANAGEMENT / SOCIAL WORK IP CONSULT  CARE MANAGEMENT / SOCIAL WORK IP CONSULT    Code Status   Full Code    Time Spent on this Encounter   I, Luis Miguel Nielson MD, personally saw the patient today and spent greater than 30 minutes discharging this patient.       Luis Miguel Nielson MD  [place] [address] [facility]  [address]  [facility] [address]  Phone: [phone]  Fax: [phone]  ______________________________________________________________________    Physical Exam   Vital Signs: Temp: 98.4  F (36.9  C) Temp src: Oral BP: 117/60 Pulse: 64   Resp: 18 SpO2: 96 % O2 Device: None (Room air)    Weight: 175 lbs 0 oz  General Appearance: Alert awake and oriented x 3  Respiratory: Clear to auscultation  Cardiovascular: S1-S2 normal  GI: Soft and nontender  Skin: No rash  Other: No edema         Primary Care Physician   Physician No Ref-Primary    Discharge Orders      Med Therapy Management Referral      Reason for your hospital stay    UTI     Follow-up and recommended labs and tests     Follow up with primary care provider, Physician No Ref-Primary, within 7 days for hospital follow- up.  The following labs/tests are recommended: CBC and BMP.     Activity    Your activity upon discharge: activity as tolerated     Resume Home Care Services     Diet    Follow this diet upon discharge: Current Diet:Orders Placed This Encounter      Combination Diet Regular Diet Adult       Significant Results and Procedures   Most Recent 3 CBC's:  Recent Labs   Lab Test 12/14/24  0743 12/13/24  1106 12/12/24  0614   WBC 8.9 10.3 13.8*   HGB 7.9* 8.4* 8.8*   MCV 92 93 90    263 264     Most Recent 3 BMP's:  Recent Labs   Lab Test 12/15/24  0939 12/15/24  0213 12/14/24  2212 12/14/24  0812 12/14/24  0743 12/13/24  1141 12/13/24  1106 12/12/24  0857 12/12/24  0614   NA  --   --   --   --  139  --  135  --  139   POTASSIUM  --   --   --   --  3.9  --  4.0  --  3.9   CHLORIDE  --   --   --   --  108*  --  104  --   106   CO2  --   --   --   --  19*  --  20*  --  19*   BUN  --   --   --   --  38.9*  --  40.4*  --  42.4*   CR  --   --   --   --  1.45*  --  1.55*  --  1.66*   ANIONGAP  --   --   --   --  12  --  11  --  14   KEILY  --   --   --   --  8.4*  --  8.4*  --  8.4*   * 165* 187*   < > 123*   < > 97   < > 115*    < > = values in this interval not displayed.     Most Recent 2 LFT's:  Recent Labs   Lab Test 12/14/24  0743 12/13/24  1106   AST 31 28   ALT 66* 71*   ALKPHOS 106 98   BILITOTAL <0.2 0.3   ,   Results for orders placed or performed during the hospital encounter of 12/10/24   CT Abdomen Pelvis w/o Contrast    Narrative    EXAM: CT ABDOMEN PELVIS W/O CONTRAST  LOCATION: [facility]  DATE: 12/10/2024    INDICATION: abd pain, greatest on the left  COMPARISON: CT abdomen and pelvis 08/27/2019  TECHNIQUE: CT scan of the abdomen and pelvis was performed without IV contrast. Multiplanar reformats were obtained. Dose reduction techniques were used.  CONTRAST: None.    FINDINGS:   LOWER CHEST: Calcified pleural plaques in calcified granulomas right lower lobe. Small groundglass opacities right lower lobe.    HEPATOBILIARY: Liver transplant.    PANCREAS: Atrophic.    SPLEEN: Normal.    ADRENAL GLANDS: Normal.    KIDNEYS/BLADDER: A few small hyperdense lesions left renal cortex, too small to further characterize. No stones or hydronephrosis.,.    BOWEL: Diverticula throughout the colon, most pronounced within the sigmoid colon. There is a small amount of free pelvic fluid adjacent to the posterior sigmoid colon. No evidence for obstruction. Moderate amount of stool throughout the colon to the   level of the rectum.    LYMPH NODES: Normal.    VASCULATURE: Normal.    PELVIC ORGANS: Hysterectomy. Trace free pelvic fluid.    MUSCULOSKELETAL: Osteopenia. Degenerative disc disease lower thoracic and lower lumbar spine. Several small fat-containing ventral wall hernias.      Impression    IMPRESSION:    1.  Left colonic diverticula. Small amount of fluid adjacent to the sigmoid colon possibly secondary to acute diverticulitis. No additional inflammatory changes or evidence for obstruction.  2.  Constipation.  3.  Liver transplant.   4.  Calcified pleural plaques right lung likely related to prior asbestos exposure. Small groundglass opacities right lower lobe, of indeterminate time frame, possibly chronic.  5.  Tiny hyperdense lesions left kidney too small to further characterize, but could represent proteinaceous or hemorrhagic cysts.         *Note: Due to a large number of results and/or encounters for the requested time period, some results have not been displayed. A complete set of results can be found in Results Review.       Discharge Medications   Current Discharge Medication List        START taking these medications    Details   cefdinir (OMNICEF) 250 MG/5ML suspension Take 6 mLs (300 mg) by mouth 2 times daily for 5 days.  Qty: 60 mL, Refills: 0    Associated Diagnoses: Acute pyelonephritis           CONTINUE these medications which have NOT CHANGED    Details   apixaban ANTICOAGULANT (ELIQUIS ANTICOAGULANT) 2.5 MG tablet Take 1 tablet (2.5 mg) by mouth 2 times daily  Qty: 180 tablet, Refills: 3    Associated Diagnoses: Paroxysmal atrial fibrillation (H)      azelastine (ASTELIN) 0.1 % nasal spray Spray 1 spray into both nostrils as needed for allergies or rhinitis.      calcitRIOL (ROCALTROL) 0.25 MCG capsule Take 1 capsule (0.25 mcg) by mouth daily.  Qty: 90 capsule, Refills: 3    Associated Diagnoses: Papillary thyroid carcinoma (H)      D-MANNOSE PO Take 1 Scoop by mouth 2 times daily.      estradiol (ESTRACE) 0.1 MG/GM vaginal cream Place vaginally every other day.      evolocumab (REPATHA SURECLICK) 140 MG/ML prefilled autoinjector Inject 1 mL (140 mg) subcutaneously every 14 days. . Please have fasting labs drawn for further refills.  Qty: 6 mL, Refills: 3    Associated Diagnoses: Hyperlipidemia  LDL goal <70; Statin intolerance; HDL deficiency; Type 2 diabetes mellitus with stage 3 chronic kidney disease, without long-term current use of insulin (H); Hypertriglyceridemia; H/O: CVA (cerebrovascular accident)      ezetimibe (ZETIA) 10 MG tablet Take 1 tablet (10 mg) by mouth daily.  Qty: 90 tablet, Refills: 3    Associated Diagnoses: Hyperlipidemia LDL goal <70; Benign essential hypertension      Ferrous Sulfate 324 MG TBEC Take 1 tablet by mouth daily.      folic acid (FOLVITE) 1 MG tablet Take 1 tablet (1 mg) by mouth daily.  Qty: 100 tablet, Refills: 0    Associated Diagnoses: Liver replaced by transplant (H)      gabapentin (NEURONTIN) 250 MG/5ML solution Take 6 mLs (300 mg) by mouth at bedtime  Qty: 180 mL, Refills: 0    Associated Diagnoses: Liver replaced by transplant (H)      hydrALAZINE (APRESOLINE) 50 MG tablet Take 1 tablet (50 mg) by mouth 3 times daily.  Qty: 270 tablet, Refills: 3    Associated Diagnoses: Benign essential hypertension      hypromellose (ARTIFICIAL TEARS) 0.4 % SOLN ophthalmic solution Apply 1 drop to eye every hour as needed for dry eyes    Associated Diagnoses: Liver replaced by transplant (H)      levothyroxine (SYNTHROID/LEVOTHROID) 175 MCG tablet Take 1 tablet (175 mcg) by mouth daily.  Qty: 90 tablet, Refills: 3    Associated Diagnoses: Postoperative hypothyroidism      linagliptin (TRADJENTA) 5 MG TABS tablet Take 1 tablet (5 mg) by mouth daily  Qty: 90 tablet, Refills: 1    Associated Diagnoses: Type 2 diabetes mellitus with stage 3 chronic kidney disease, without long-term current use of insulin (H)      meclizine (ANTIVERT) 25 MG tablet Take 1 tablet (25 mg) by mouth as needed for dizziness or other (migraines)  Qty: 30 tablet, Refills: 11    Associated Diagnoses: Dizziness      metoprolol succinate ER (TOPROL XL) 25 MG 24 hr tablet Take 1 tablet (25 mg) by mouth 2 times daily. Take an extra tablet daily as needed for breakthrough afib. May repeat in two hours if heart  rate remains >100bpm (no more than 4 tablets per day).    Associated Diagnoses: Paroxysmal atrial fibrillation (H)      Multiple Vitamins-Minerals (PRESERVISION AREDS 2) CAPS Take 1 capsule by mouth 2 times daily    Associated Diagnoses: Liver replaced by transplant (H)      Nutrisource Fiber PO packet Take 1 packet by mouth daily.      omega-3 acid ethyl esters (LOVAZA) 1 g capsule Take 1 capsule by mouth 2 times daily  Qty: 180 capsule, Refills: 1    Associated Diagnoses: Hypertriglyceridemia      predniSONE (DELTASONE) 10 MG tablet Take 1 tablet (10 mg) by mouth daily.  Qty: 90 tablet, Refills: 3    Comments: TXP DT 5/22/2002 (Liver) TXP Dischg DT 6/3/2002 DX Liver replaced by transplant Z94.4 [facility] ([place], [place])  Associated Diagnoses: Liver replaced by transplant (H)      sirolimus (GENERIC EQUIVALENT) 1 MG tablet Take 1 tablet (1 mg) by mouth daily.  Qty: 90 tablet, Refills: 3    Comments: TXP DT 5/22/2002 (Liver) TXP Dischg DT 6/3/2002 DX Liver replaced by transplant Z94.4 [facility] ([place], [place])  Associated Diagnoses: Liver replaced by transplant (H)      ursodiol (ACTIGALL) 250 MG tablet Take 1 tablet (250 mg) by mouth 2 times daily.  Qty: 180 tablet, Refills: 3    Associated Diagnoses: Liver replaced by transplant (H)      glucose (BD GLUCOSE) 5 g chewable tablet Take 2 tablets (10 g) by mouth as needed (low blood sugar)  Qty: 40 tablet, Refills: 1    Associated Diagnoses: Type 2 diabetes mellitus with stage 3 chronic kidney disease, without long-term current use of insulin (H)      ketoconazole (NIZORAL) 2 % external cream Apply topically 2 times daily as needed (redness and flaking). Apply to the face.  Qty: 30 g, Refills: 3    Associated Diagnoses: Seborrheic dermatitis      ketoconazole (NIZORAL) 2 % external shampoo Apply topically three times a week. Lather in the shower, wait 3-5 minutes  before rinsing.  Qty: 100 mL, Refills: 11    Associated Diagnoses: Seborrheic dermatitis           STOP taking these medications       furosemide (LASIX) 20 MG tablet Comments:   Reason for Stopping:             Allergies   Allergies   Allergen Reactions    Fluconazole Hives and Itching     Full body hives    [See intradermal skin testing results from 8/2/2019]    Mycophenolate Diarrhea and Nausea and Vomiting     Patient stated it was chronic and lasted months      Penicillins Anaphylaxis, Hives, Itching and Rash     [See intradermal skin testing results from 8/2/2019]      Simvastatin Muscle Pain (Myalgia)     severe  Other reaction(s): Myalgia caused by statin    Methotrexate Other (See Comments)     Other reaction(s): Sore  Sores in mouth, esophagus, and stomach.       Morphine And Codeine Itching and Other (See Comments)     Psych disturbance  Other reaction(s): Confusion, Mood alteration    Quinolones Anxiety, Dizziness, Headache, Other (See Comments), Palpitations and Unknown     Other reaction(s): Hyperactive behavior, Lightheadedness, Mood alteration    Dizzy, light headed    Dizziness, shaky, and jumpy    Benadryl [Diphenhydramine Hcl]      Insomnia     Capsules, Empty Gelatin [Gelatin]     Lansoprazole Diarrhea    Azithromycin Itching     [See intradermal skin testing results from 8/2/2019]    Bactrim [Sulfamethoxazole-Trimethoprim] Other (See Comments)     Numb mouth, tingling lips (treated with anti-histamines)    Cephalosporins Itching     [See intradermal skin testing results from 8/2/2019]    Ciprofloxacin Hcl Other (See Comments) and Dizziness     Insomnia, mood lability, Irregular heart beat         Doxycycline Itching and Unknown     [See intradermal skin testing results from 8/2/2019]    Lisinopril Cough    Omeprazole Itching    Tolectin [Nsaids] Rash    Tolmetin Rash and Itching    Tramadol Rash, Hives and Itching

## 2024-12-15 NOTE — PLAN OF CARE
"Goal Outcome Evaluation:      Plan of Care Reviewed With: patient    Overall Patient Progress: improvingOverall Patient Progress: improving    End of Shift Summary  For vital signs and complete assessments, please see documentation flowsheets.     Pertinent assessments: Alert and oriented x4. Denies pain. Tolerating diet, , 181, and 172. Transfers with SBA. AUO. Plan to discharge tomorrow. MD notified d/t pt requesting Epipen at discharge.     Major Shift Events: Gave first dose of omniceff, pt stated there was \"lip numbness immediately\" MD notified. No new orders, continue to monitor. O2 maintained 95-96%. No lip swelling noted. MD notified of pt stated \"increased lip swelling\" liquid benadryl given and MD notified. MD came and saw patient.     Treatment Plan: discharge back to facility tomorrow.      Problem: Adult Inpatient Plan of Care  Goal: Plan of Care Review  Description: The Plan of Care Review/Shift note should be completed every shift.  The Outcome Evaluation is a brief statement about your assessment that the patient is improving, declining, or no change.  This information will be displayed automatically on your shift  note.  Outcome: Progressing  Flowsheets (Taken 12/14/2024 2113)  Plan of Care Reviewed With: patient  Overall Patient Progress: improving  Goal: Patient-Specific Goal (Individualized)  Description: You can add care plan individualizations to a care plan. Examples of Individualization might be:  \"Parent requests to be called daily at 9am for status\", \"I have a hard time hearing out of my right ear\", or \"Do not touch me to wake me up as it startles  me\".  Outcome: Progressing  Goal: Absence of Hospital-Acquired Illness or Injury  Outcome: Progressing  Intervention: Identify and Manage Fall Risk  Recent Flowsheet Documentation  Taken 12/14/2024 0937 by Skylar Ngo, RN  Safety Promotion/Fall Prevention:   activity supervised   safety round/check completed   clutter free environment " maintained   increased rounding and observation   increase visualization of patient   lighting adjusted   mobility aid in reach   nonskid shoes/slippers when out of bed   patient and family education  Intervention: Prevent Skin Injury  Recent Flowsheet Documentation  Taken 12/14/2024 0917 by Skylar Ngo, RN  Body Position: position changed independently  Intervention: Prevent and Manage VTE (Venous Thromboembolism) Risk  Recent Flowsheet Documentation  Taken 12/14/2024 0917 by Skylar Ngo RN  VTE Prevention/Management:   SCDs off (sequential compression devices)   patient refused intervention  Intervention: Prevent Infection  Recent Flowsheet Documentation  Taken 12/14/2024 0917 by Skylar Ngo, RN  Infection Prevention:   rest/sleep promoted   single patient room provided  Goal: Optimal Comfort and Wellbeing  Outcome: Progressing  Goal: Readiness for Transition of Care  Outcome: Progressing     Problem: UTI (Urinary Tract Infection)  Goal: Improved Infection Symptoms  Outcome: Progressing

## 2024-12-15 NOTE — PLAN OF CARE
"Goal Outcome Evaluation:      Plan of Care Reviewed With: patient    Overall Patient Progress: improvingOverall Patient Progress: improving    Patient A&Ox4, denied pain. Up with assist of one, on omnicef. No reaction this evening but patient requested for benadryl stating \"I want the benadryl incase I have a reaction when I am sleeping\". Plan is to discharge back to her independent living facility tomorrow.  Problem: Adult Inpatient Plan of Care  Goal: Plan of Care Review  Description: The Plan of Care Review/Shift note should be completed every shift.  The Outcome Evaluation is a brief statement about your assessment that the patient is improving, declining, or no change.  This information will be displayed automatically on your shift  note.  Outcome: Progressing  Flowsheets (Taken 12/14/2024 2304)  Plan of Care Reviewed With: patient  Overall Patient Progress: improving  Goal: Patient-Specific Goal (Individualized)  Description: You can add care plan individualizations to a care plan. Examples of Individualization might be:  \"Parent requests to be called daily at 9am for status\", \"I have a hard time hearing out of my right ear\", or \"Do not touch me to wake me up as it startles  me\".  Outcome: Progressing  Goal: Absence of Hospital-Acquired Illness or Injury  Outcome: Progressing  Intervention: Identify and Manage Fall Risk  Recent Flowsheet Documentation  Taken 12/14/2024 2200 by Emely Tamez, RN  Safety Promotion/Fall Prevention:   activity supervised   assistive device/personal items within reach   clutter free environment maintained   nonskid shoes/slippers when out of bed  Intervention: Prevent Skin Injury  Recent Flowsheet Documentation  Taken 12/14/2024 2200 by Emely Tamez, RN  Body Position: position changed independently  Intervention: Prevent and Manage VTE (Venous Thromboembolism) Risk  Recent Flowsheet Documentation  Taken 12/14/2024 2200 by Emely Tamez, RN  VTE " Prevention/Management: SCDs off (sequential compression devices)  Goal: Optimal Comfort and Wellbeing  Outcome: Progressing  Goal: Readiness for Transition of Care  Outcome: Progressing     Problem: UTI (Urinary Tract Infection)  Goal: Improved Infection Symptoms  Outcome: Progressing

## 2024-12-16 ENCOUNTER — TELEPHONE (OUTPATIENT)
Dept: PHARMACY | Facility: OTHER | Age: 74
End: 2024-12-16
Payer: MEDICARE

## 2024-12-16 NOTE — TELEPHONE ENCOUNTER
MTM referral from: Transitions of Care (recent hospital discharge, TCU discharge, or ED visit)    MTM referral outreach attempt #1 on December 16, 2024 at 2:34 PM      Outcome: Spoke with patient declined MTM knows her meds    Use vbc for the carrier/Plan on the flowsheet          NOBLE Cisse   [phone]

## 2024-12-17 ENCOUNTER — TELEPHONE (OUTPATIENT)
Dept: INFECTIOUS DISEASES | Facility: CLINIC | Age: 74
End: 2024-12-17
Payer: MEDICARE

## 2024-12-17 DIAGNOSIS — N39.0 RECURRENT UTI: ICD-10-CM

## 2024-12-18 ENCOUNTER — PRE VISIT (OUTPATIENT)
Dept: NEUROSURGERY | Facility: CLINIC | Age: 74
End: 2024-12-18

## 2024-12-18 NOTE — TELEPHONE ENCOUNTER
Central Prior Authorization Team  Phone: [phone]    PA Initiation    Medication: FOSFOMYCIN TROMETHAMINE 3 G PO PACK  Insurance Company: [email or web address] Part D - Phone [phone] Fax [phone]  Pharmacy Filling the Rx: [facility] [address] [place] [id number] [facility]  Filling Pharmacy Phone: [phone]  Filling Pharmacy Fax:    Start Date: 12/18/2024

## 2024-12-18 NOTE — TELEPHONE ENCOUNTER
Prior Authorization Retail Medication Request    Medication/Dose: fosfomycin (MONUROL) 3 g Packet - Sig - Route: Take 1 packet (3 g) by mouth once for 1 dose. - Oral   Dispense: 5 packet Refill: 3  Diagnosis and ICD code (if different than what is on RX):  Recurrent UTI [N39.0]  - Primary   New/renewal/insurance change PA/secondary ins. PA:  Previously Tried and Failed:  Meropenem  Rationale:  Patient had ESBL organism in 09/22/24 urine culture. If fosfomycin is not approved then patient will need to be admitted at the ED for IV antibiotics. Patient has many antibiotic allergies and with increasing bacterial resistance, she can take only macrobid or fosfomycin.     Insurance   Primary: Medicare  Insurance ID:  [id number]     Secondary (if applicable): [employer]  Insurance ID:  [id number]     Pharmacy Information (if different than what is on RX)  Name:  [facility] [address] [place] [address]   Phone:  [phone]   Fax:    [phone]       Clinic Information  Preferred routing pool for dept communication: [facility] INFECTIOUS DISEASE ADULT CSC RN

## 2024-12-19 ENCOUNTER — HOSPITAL ENCOUNTER (OUTPATIENT)
Dept: RESEARCH | Facility: CLINIC | Age: 74
Discharge: HOME | End: 2024-12-19
Attending: INTERNAL MEDICINE
Payer: MEDICARE

## 2024-12-19 ENCOUNTER — OFFICE VISIT (OUTPATIENT)
Dept: OPHTHALMOLOGY | Facility: CLINIC | Age: 74
End: 2024-12-19
Attending: OPHTHALMOLOGY
Payer: MEDICARE

## 2024-12-19 DIAGNOSIS — Z96.1 PSEUDOPHAKIA: ICD-10-CM

## 2024-12-19 DIAGNOSIS — H40.003 GLAUCOMA SUSPECT OF BOTH EYES: ICD-10-CM

## 2024-12-19 DIAGNOSIS — H35.3113 ADVANCED ATROPHIC NONEXUDATIVE AGE-RELATED MACULAR DEGENERATION OF RIGHT EYE WITHOUT SUBFOVEAL INVOLVEMENT: Primary | ICD-10-CM

## 2024-12-19 DIAGNOSIS — H35.3120 NONEXUDATIVE AGE-RELATED MACULAR DEGENERATION OF LEFT EYE, UNSPECIFIED STAGE: ICD-10-CM

## 2024-12-19 DIAGNOSIS — E11.9 DIABETES MELLITUS TYPE 2 WITHOUT RETINOPATHY (H): ICD-10-CM

## 2024-12-19 DIAGNOSIS — Z00.6 EXAMINATION OF PARTICIPANT OR CONTROL IN CLINICAL RESEARCH: Primary | ICD-10-CM

## 2024-12-19 PROCEDURE — 510N000009 HC RESEARCH FACILITY, PER 15 MIN

## 2024-12-19 PROCEDURE — 92134 CPTRZ OPH DX IMG PST SGM RTA: CPT | Performed by: OPHTHALMOLOGY

## 2024-12-19 PROCEDURE — 92250 FUNDUS PHOTOGRAPHY W/I&R: CPT | Performed by: OPHTHALMOLOGY

## 2024-12-19 PROCEDURE — 510N000017 HC CRU PATIENT CARE, PER 15 MIN

## 2024-12-19 RX ORDER — GRANULES FOR ORAL 3 G/1
3 POWDER ORAL ONCE
Qty: 1 PACKET | Refills: 3 | Status: SHIPPED | OUTPATIENT
Start: 2024-12-19 | End: 2024-12-19

## 2024-12-19 ASSESSMENT — VISUAL ACUITY
METHOD_MR: DEFERS REFRACTION
METHOD: SNELLEN - LINEAR
OD_SC: 20/30
OS_SC: 20/25
OS_SC+: -2

## 2024-12-19 ASSESSMENT — CONF VISUAL FIELD
OS_SUPERIOR_TEMPORAL_RESTRICTION: 0
METHOD: COUNTING FINGERS
OS_INFERIOR_TEMPORAL_RESTRICTION: 0
OS_NORMAL: 1
OS_SUPERIOR_NASAL_RESTRICTION: 0
OS_INFERIOR_NASAL_RESTRICTION: 0

## 2024-12-19 ASSESSMENT — EXTERNAL EXAM - RIGHT EYE: OD_EXAM: NORMAL

## 2024-12-19 ASSESSMENT — CUP TO DISC RATIO
OD_RATIO: 0.5
OS_RATIO: 0.5

## 2024-12-19 ASSESSMENT — TONOMETRY
OD_IOP_MMHG: 15
IOP_METHOD: TONOPEN
OS_IOP_MMHG: 12

## 2024-12-19 ASSESSMENT — EXTERNAL EXAM - LEFT EYE: OS_EXAM: NORMAL

## 2024-12-19 NOTE — NURSING NOTE
Chief Complaints and History of Present Illnesses   Patient presents with    Dry Macular Degeneration Follow Up     Chief Complaint(s) and History of Present Illness(es)       Dry Macular Degeneration Follow Up              Laterality: both eyes    Associated symptoms: dryness and floaters.  Negative for eye pain, flashes, itching and burning    Pain scale: 0/10              Comments    Virginia is here new to clinic (last visit was 5 years ago) for evaluation and care for non-exudative macular degeneration of both eyes. Today she states vision is about the same as last visit.    Sadi Scanlon COT 10:38 AM December 19, 2024

## 2024-12-19 NOTE — TELEPHONE ENCOUNTER
Prior Authorization Approval    Medication: FOSFOMYCIN TROMETHAMINE 3 G PO PACK  Authorization Effective Date: 12/18/2024  Authorization Expiration Date: 12/31/2025  Approved Dose/Quantity:   Reference #:     Insurance Company: [employer] Part D - Phone [phone] Fax [phone]  Expected CoPay: $    CoPay Card Available:      Financial Assistance Needed:   Which Pharmacy is filling the prescription: [facility] [address] [place] [address] - [id number] [place] [address]  Pharmacy Notified: Yes- pharmacy needs a new rx.  Patient Notified: No

## 2024-12-19 NOTE — PROGRESS NOTES
Clinical Research Unit Nursing Note:  Virginia JERMAIN Jay presents today for Cycle 2, Day 16 for Study: Olema Phase 1b (IDS #: [id number])      Patient seen by provider today: No                            present during visit today: Not Applicable.    Pre-dose PKs drawn via ultrasound.    Study - Olema (IDS# [id number]) Self Administration Oral Dose of OP-1250 and Ribociclib @ 0758    Note: Patient fasting 2 hrs prior and 1 hour after dose.      Post Dose Assessment:  Patient tolerated oral dose without incident.    No symptoms, ok to discharge.  Access discontinued per protocol.      Discharge Plan:   Patient discharged in stable condition accompanied by: self.  Departure Mode: Ambulatory.     Luh Morales RN on 12/19/2024 at 10:33 AM

## 2024-12-19 NOTE — PROGRESS NOTES
CC: follow up nonexudative Age related macular degeneration and diabetic exam    Interval: Last visit 7/24/2019. Patient was living in [place] for the past 5 years. Patient has no concerns today. Wanted an annual eye exam. Patient has been seeing an eye doctor in [place] who told her that everything had been stable from year to year.    Patient thinks vision has been stable. Increased difficulty with reading fine prints. No flashes or new floaters.     Lab Results   Component Value Date    A1C 6.9 12/10/2024    A1C 6.9 09/09/2024    A1C 6.4 10/16/2018    A1C 6.8 05/18/2018         HPI: Luz Thompson is a  66 year old year-old patient with history of nonexudative AMD both eyes presenting for follow up.  Has noticed mild decrease in vision both eyes.     RETINAL IMAGING  Optical Coherence Tomography 12/19/24 vs 7.24-19  Right eye - new areas of Ellipsoid Zone /RPE loss superotemporal macula, drusen, trace Epiretinal membrane. PVD.  Left eye- poor quality OCT     Autofluorescence 12/19/24 vs 7.24-19  Right eye-hyperautofluorescent spots indicating drusen and RPE changes, superotemporal spots of hypoautofluorescence representing geographic atrophy?   Left eye--hyperautofluorescent spots indicating drusen and RPE changes      Assessment & Plan:    # Nonexudative senile macular degeneration of retina, bilateral  2. Geographic atrophy, right eye   - Bilateral drusen  - new areas of atrophy with corresponding loss of IS/OS junction noted 12/19/24 raising concern for geographic atrophy of the right eye. Currently not in the center of the vision affecting the fovea. Will monitor for progression.     - On AREDS2  - Continue Amsler    # Type 2 diabetes without retinopathy   Most recent A1c 6.9 (12/10/24)     # ERM OD  - Mild, not likely visually significant   - Observe    # Pseudophakia of both eyes  - s/p Yag capsulotomy of right eye  - PCO left eye not in the visual axis   Observe patient asymptomatic    # Posterior  vitreous detachment, bilateral  - Retinal detachment precautions given (need to return for immediate exam for increasing flashes or light, floaters, worsening vision, or the appearance of a shadow or curtain in the vision)    # Glaucoma suspect  (+) grandfather with glaucoma   Plan for glaucoma evaluation     # Cobblestone degeneration  Observe    Return in 6 months Optical Coherence Tomography; optos; retinal nerve fiber layer; gonio   No dilation  Noemi Choi MD  PGY-3  Department of Ophthalmology  December 19, 2024 11:30 AM         ~~~~~~~~~~~~~~~~~~~~~~~~~~~~~~~~~~   Complete documentation of historical and exam elements from today's encounter can be found in the full encounter summary report (not reduplicated in this progress note).  I personally obtained the chief complaint(s) and history of present illness.  I confirmed and edited as necessary the review of systems, past medical/surgical history, family history, social history, and examination findings as documented by others; and I examined the patient myself.  I personally reviewed the relevant tests, images, and reports as documented above.  I formulated and edited as necessary the assessment and plan and discussed the findings and management plan with the patient and family    Meri Frost MD  .  Retina Service   Department of Ophthalmology and Visual Neurosciences   [facility]  Phone: [phone]   Fax: [phone]

## 2024-12-27 ENCOUNTER — MYC MEDICAL ADVICE (OUTPATIENT)
Dept: ENDOCRINOLOGY | Facility: CLINIC | Age: 74
End: 2024-12-27
Payer: MEDICARE

## 2024-12-27 DIAGNOSIS — E11.22 TYPE 2 DIABETES MELLITUS WITH STAGE 3 CHRONIC KIDNEY DISEASE, WITHOUT LONG-TERM CURRENT USE OF INSULIN (H): ICD-10-CM

## 2024-12-27 DIAGNOSIS — E89.0 POSTOPERATIVE HYPOTHYROIDISM: ICD-10-CM

## 2024-12-27 DIAGNOSIS — N18.30 TYPE 2 DIABETES MELLITUS WITH STAGE 3 CHRONIC KIDNEY DISEASE, WITHOUT LONG-TERM CURRENT USE OF INSULIN (H): ICD-10-CM

## 2024-12-27 DIAGNOSIS — C73 PAPILLARY THYROID CARCINOMA (H): ICD-10-CM

## 2024-12-27 RX ORDER — LEVOTHYROXINE SODIUM 175 UG/1
175 TABLET ORAL DAILY
Qty: 90 TABLET | Refills: 1 | Status: SHIPPED | OUTPATIENT
Start: 2024-12-27

## 2024-12-27 RX ORDER — CALCITRIOL 0.25 UG/1
0.25 CAPSULE, LIQUID FILLED ORAL DAILY
Qty: 90 CAPSULE | Refills: 1 | Status: SHIPPED | OUTPATIENT
Start: 2024-12-27

## 2024-12-27 NOTE — TELEPHONE ENCOUNTER
Medication Requested:  Dariana 2 sensor   linagliptin (TRADJENTA)  ----------------------  Last Office Visit : 9/9/2024  [facility] Office visit:     3/7/2025 2:00 PM (30 min)  [place]   Arrive by:  1:45 PM   RETURN ENDOCRINE   [facility] ([facility] [place])   Rachel Masterson MBBS     ----------------------    Refill decision: Levothyroxine, Calcitriol scripts transferred from [facility] mail order pharmacy to [facility] mail order pharmacy as requested.    Refill decision: Refill pended and routed to the provider for review/determination due to the following criteria not met:     Tradjenta - last script from 2020, [employer] message sent to patient inquiring as to when she might be needing refills on this med.    Neil 2 Sensor not on active meds list, Historical/Reported med. Ordered/Pended for review.        Encounter initiated from [employer] Request:   Since coming to [place] I have a new Medicare D plan and hence a new mail order pharmacy and a new local pharmacy  The mail order pharmacy for levothyroxine Tradjenta and Calcitrol is    [facility] phone   I need refills on all of these please   For my Dariana 2 sensor I am using the [facility] in [place] as my insurance doesn t cover it but [facility] accepts [facility] and the mail order doesn t.   I need that one ASAP please   Thank you      Luz Thompson

## 2024-12-30 RX ORDER — LINAGLIPTIN 5 MG/1
5 TABLET, FILM COATED ORAL DAILY
Qty: 90 TABLET | Refills: 1 | Status: SHIPPED | OUTPATIENT
Start: 2024-12-30

## 2025-01-01 ENCOUNTER — APPOINTMENT (OUTPATIENT)
Dept: CARDIOLOGY | Facility: CLINIC | Age: 75
DRG: 270 | End: 2025-01-01
Attending: STUDENT IN AN ORGANIZED HEALTH CARE EDUCATION/TRAINING PROGRAM
Payer: MEDICARE

## 2025-01-01 ENCOUNTER — APPOINTMENT (OUTPATIENT)
Dept: GENERAL RADIOLOGY | Facility: CLINIC | Age: 75
DRG: 270 | End: 2025-01-01
Payer: MEDICARE

## 2025-01-01 ENCOUNTER — RESULTS FOLLOW-UP (OUTPATIENT)
Dept: TRANSPLANT | Facility: CLINIC | Age: 75
End: 2025-01-01

## 2025-01-01 ENCOUNTER — RESULTS FOLLOW-UP (OUTPATIENT)
Dept: GERIATRICS | Facility: CLINIC | Age: 75
End: 2025-01-01

## 2025-01-01 ENCOUNTER — ANESTHESIA EVENT (OUTPATIENT)
Dept: INTENSIVE CARE | Facility: CLINIC | Age: 75
DRG: 270 | End: 2025-01-01
Payer: MEDICARE

## 2025-01-01 ENCOUNTER — HOSPITAL ENCOUNTER (INPATIENT)
Facility: CLINIC | Age: 75
LOS: 1 days | DRG: 270 | End: 2025-07-13
Attending: EMERGENCY MEDICINE | Admitting: INTERNAL MEDICINE
Payer: MEDICARE

## 2025-01-01 ENCOUNTER — APPOINTMENT (OUTPATIENT)
Dept: GENERAL RADIOLOGY | Facility: CLINIC | Age: 75
DRG: 270 | End: 2025-01-01
Attending: EMERGENCY MEDICINE
Payer: MEDICARE

## 2025-01-01 ENCOUNTER — LAB REQUISITION (OUTPATIENT)
Dept: LAB | Facility: CLINIC | Age: 75
End: 2025-01-01
Payer: MEDICARE

## 2025-01-01 ENCOUNTER — ANESTHESIA (OUTPATIENT)
Dept: INTENSIVE CARE | Facility: CLINIC | Age: 75
DRG: 270 | End: 2025-01-01
Payer: MEDICARE

## 2025-01-01 ENCOUNTER — APPOINTMENT (OUTPATIENT)
Dept: GENERAL RADIOLOGY | Facility: CLINIC | Age: 75
DRG: 270 | End: 2025-01-01
Attending: STUDENT IN AN ORGANIZED HEALTH CARE EDUCATION/TRAINING PROGRAM
Payer: MEDICARE

## 2025-01-01 VITALS
SYSTOLIC BLOOD PRESSURE: 65 MMHG | OXYGEN SATURATION: 97 % | WEIGHT: 192.46 LBS | BODY MASS INDEX: 29.7 KG/M2 | TEMPERATURE: 95 F | DIASTOLIC BLOOD PRESSURE: 35 MMHG

## 2025-01-01 DIAGNOSIS — D64.9 ANEMIA, UNSPECIFIED: ICD-10-CM

## 2025-01-01 DIAGNOSIS — N18.30 CHRONIC KIDNEY DISEASE, STAGE 3 UNSPECIFIED (H): ICD-10-CM

## 2025-01-01 DIAGNOSIS — I21.19 ST ELEVATION MYOCARDIAL INFARCTION (STEMI) INVOLVING OTHER CORONARY ARTERY OF INFERIOR WALL (H): Primary | ICD-10-CM

## 2025-01-01 DIAGNOSIS — I10 ESSENTIAL (PRIMARY) HYPERTENSION: ICD-10-CM

## 2025-01-01 DIAGNOSIS — N39.0 URINARY TRACT INFECTION: ICD-10-CM

## 2025-01-01 DIAGNOSIS — I15.2 HYPERTENSION SECONDARY TO ENDOCRINE DISORDERS: ICD-10-CM

## 2025-01-01 DIAGNOSIS — I21.19 ACUTE ST ELEVATION MYOCARDIAL INFARCTION (STEMI) OF INFERIOR WALL (H): ICD-10-CM

## 2025-01-01 DIAGNOSIS — K65.0: ICD-10-CM

## 2025-01-01 DIAGNOSIS — Z48.23 ENCOUNTER FOR AFTERCARE FOLLOWING LIVER TRANSPLANT (H): ICD-10-CM

## 2025-01-01 DIAGNOSIS — K65.1 PERITONEAL ABSCESS (H): ICD-10-CM

## 2025-01-01 DIAGNOSIS — I21.3 ST ELEVATION MI (STEMI) (H): ICD-10-CM

## 2025-01-01 DIAGNOSIS — R57.0 CARDIOGENIC SHOCK (H): ICD-10-CM

## 2025-01-01 LAB
ACANTHOCYTES BLD QL SMEAR: SLIGHT
ACT BLD: 215 SECONDS (ref 74–150)
ACT BLD: 263 SECONDS (ref 74–150)
ACT BLD: 288 SECONDS (ref 74–150)
ALBUMIN SERPL BCG-MCNC: 2.3 G/DL (ref 3.5–5.2)
ALBUMIN SERPL BCG-MCNC: 2.4 G/DL (ref 3.5–5.2)
ALBUMIN SERPL BCG-MCNC: 2.5 G/DL (ref 3.5–5.2)
ALBUMIN SERPL BCG-MCNC: 2.5 G/DL (ref 3.5–5.2)
ALBUMIN SERPL BCG-MCNC: 2.6 G/DL (ref 3.5–5.2)
ALBUMIN SERPL BCG-MCNC: 3.2 G/DL (ref 3.5–5.2)
ALBUMIN UR-MCNC: 300 MG/DL
ALLEN'S TEST: ABNORMAL
ALP SERPL-CCNC: 123 U/L (ref 40–150)
ALP SERPL-CCNC: 131 U/L (ref 40–150)
ALP SERPL-CCNC: 133 U/L (ref 40–150)
ALP SERPL-CCNC: 141 U/L (ref 40–150)
ALP SERPL-CCNC: 82 U/L (ref 40–150)
ALP SERPL-CCNC: 98 U/L (ref 40–150)
ALT SERPL W P-5'-P-CCNC: 2619 U/L (ref 0–50)
ALT SERPL W P-5'-P-CCNC: 266 U/L (ref 0–50)
ALT SERPL W P-5'-P-CCNC: 27 U/L (ref 0–50)
ALT SERPL W P-5'-P-CCNC: 35 U/L (ref 0–50)
ALT SERPL W P-5'-P-CCNC: 3694 U/L (ref 0–50)
ALT SERPL W P-5'-P-CCNC: 620 U/L (ref 0–50)
ANION GAP BLD CALC-SCNC: 22 MMOL/L (ref 7–15)
ANION GAP SERPL CALCULATED.3IONS-SCNC: 15 MMOL/L (ref 7–15)
ANION GAP SERPL CALCULATED.3IONS-SCNC: 21 MMOL/L (ref 7–15)
ANION GAP SERPL CALCULATED.3IONS-SCNC: 25 MMOL/L (ref 7–15)
ANION GAP SERPL CALCULATED.3IONS-SCNC: 28 MMOL/L (ref 7–15)
ANION GAP SERPL CALCULATED.3IONS-SCNC: 29 MMOL/L (ref 7–15)
ANION GAP SERPL CALCULATED.3IONS-SCNC: 30 MMOL/L (ref 7–15)
ANION GAP SERPL CALCULATED.3IONS-SCNC: 36 MMOL/L (ref 7–15)
APPEARANCE UR: ABNORMAL
APTT PPP: 131 SECONDS (ref 22–38)
APTT PPP: 41 SECONDS (ref 22–38)
AST SERPL W P-5'-P-CCNC: 3125 U/L (ref 0–45)
AST SERPL W P-5'-P-CCNC: 35 U/L (ref 0–45)
AST SERPL W P-5'-P-CCNC: 476 U/L (ref 0–45)
AST SERPL W P-5'-P-CCNC: 4834 U/L (ref 0–45)
AST SERPL W P-5'-P-CCNC: 60 U/L (ref 0–45)
AST SERPL W P-5'-P-CCNC: 950 U/L (ref 0–45)
ATRIAL RATE - MUSE: NORMAL BPM
BACTERIA #/AREA URNS HPF: ABNORMAL /HPF
BACTERIA UR CULT: NORMAL
BASE EXCESS BLDA CALC-SCNC: -13.1 MMOL/L (ref -3–3)
BASE EXCESS BLDA CALC-SCNC: -13.5 MMOL/L (ref -3–3)
BASE EXCESS BLDA CALC-SCNC: -13.7 MMOL/L (ref -3–3)
BASE EXCESS BLDA CALC-SCNC: -15.4 MMOL/L (ref -3–3)
BASE EXCESS BLDA CALC-SCNC: -15.9 MMOL/L (ref -3–3)
BASE EXCESS BLDA CALC-SCNC: -15.9 MMOL/L (ref -3–3)
BASE EXCESS BLDA CALC-SCNC: -16.2 MMOL/L (ref -3–3)
BASE EXCESS BLDA CALC-SCNC: -17 MMOL/L (ref -3–3)
BASE EXCESS BLDA CALC-SCNC: -18.7 MMOL/L (ref -3–3)
BASE EXCESS BLDA CALC-SCNC: -19.1 MMOL/L (ref -3–3)
BASE EXCESS BLDA CALC-SCNC: -20.5 MMOL/L (ref -3–3)
BASE EXCESS BLDA CALC-SCNC: -9.8 MMOL/L (ref -3–3)
BASE EXCESS BLDA CALC-SCNC: 5.1 MMOL/L (ref -3–3)
BASE EXCESS BLDV CALC-SCNC: -10.9 MMOL/L (ref -3–3)
BASE EXCESS BLDV CALC-SCNC: -11.2 MMOL/L (ref -3–3)
BASE EXCESS BLDV CALC-SCNC: -11.3 MMOL/L (ref -3–3)
BASE EXCESS BLDV CALC-SCNC: -13.1 MMOL/L (ref -3–3)
BASE EXCESS BLDV CALC-SCNC: -13.8 MMOL/L (ref -3–3)
BASE EXCESS BLDV CALC-SCNC: -14 MMOL/L (ref -3–3)
BASE EXCESS BLDV CALC-SCNC: -14.8 MMOL/L (ref -3–3)
BASE EXCESS BLDV CALC-SCNC: -15.2 MMOL/L (ref -3–3)
BASE EXCESS BLDV CALC-SCNC: -17.9 MMOL/L (ref -3–3)
BASE EXCESS BLDV CALC-SCNC: -5 MMOL/L (ref -3–3)
BASOPHILS # BLD MANUAL: 0 10E3/UL (ref 0–0.2)
BASOPHILS NFR BLD MANUAL: 0 %
BILIRUB SERPL-MCNC: 0.3 MG/DL
BILIRUB SERPL-MCNC: 0.3 MG/DL
BILIRUB SERPL-MCNC: 0.4 MG/DL
BILIRUB SERPL-MCNC: 0.5 MG/DL
BILIRUB SERPL-MCNC: 0.6 MG/DL
BILIRUB SERPL-MCNC: <0.2 MG/DL
BILIRUB UR QL STRIP: NEGATIVE
BLD PROD TYP BPU: NORMAL
BLOOD COMPONENT TYPE: NORMAL
BUN SERPL-MCNC: 30.8 MG/DL (ref 8–23)
BUN SERPL-MCNC: 31.8 MG/DL (ref 8–23)
BUN SERPL-MCNC: 33.4 MG/DL (ref 8–23)
BUN SERPL-MCNC: 34 MG/DL (ref 8–23)
BUN SERPL-MCNC: 34.8 MG/DL (ref 8–23)
BUN SERPL-MCNC: 35 MG/DL (ref 8–23)
BUN SERPL-MCNC: 35.4 MG/DL (ref 8–23)
BURR CELLS BLD QL SMEAR: SLIGHT
CA-I BLD-MCNC: 3.8 MG/DL (ref 4.4–5.2)
CA-I BLD-MCNC: 4 MG/DL (ref 4.4–5.2)
CA-I BLD-MCNC: 4.1 MG/DL (ref 4.4–5.2)
CA-I BLD-MCNC: 4.2 MG/DL (ref 4.4–5.2)
CA-I BLD-MCNC: 4.3 MG/DL (ref 4.4–5.2)
CA-I BLD-MCNC: 4.6 MG/DL (ref 4.4–5.2)
CA-I BLD-MCNC: 5 MG/DL (ref 4.4–5.2)
CALCIUM SERPL-MCNC: 7.3 MG/DL (ref 8.8–10.4)
CALCIUM SERPL-MCNC: 7.9 MG/DL (ref 8.8–10.4)
CALCIUM SERPL-MCNC: 7.9 MG/DL (ref 8.8–10.4)
CALCIUM SERPL-MCNC: 8.2 MG/DL (ref 8.8–10.4)
CALCIUM SERPL-MCNC: 8.2 MG/DL (ref 8.8–10.4)
CALCIUM SERPL-MCNC: 8.4 MG/DL (ref 8.8–10.4)
CALCIUM SERPL-MCNC: 9.2 MG/DL (ref 8.8–10.4)
CF REDUC 30M P MA P HEP LENFR BLD TEG: 0.1 % (ref 0–8)
CF REDUC 60M P MA P HEPASE LENFR BLD TEG: 0 % (ref 0–15)
CFT P HPASE BLD TEG: 1.4 MINUTE (ref 1–3)
CHLORIDE BLD-SCNC: 113 MMOL/L (ref 98–107)
CHLORIDE SERPL-SCNC: 104 MMOL/L (ref 98–107)
CHLORIDE SERPL-SCNC: 108 MMOL/L (ref 98–107)
CHLORIDE SERPL-SCNC: 109 MMOL/L (ref 98–107)
CHLORIDE SERPL-SCNC: 110 MMOL/L (ref 98–107)
CHLORIDE SERPL-SCNC: 111 MMOL/L (ref 98–107)
CHLORIDE SERPL-SCNC: 111 MMOL/L (ref 98–107)
CHLORIDE SERPL-SCNC: 114 MMOL/L (ref 98–107)
CI (COAGULATION INDEX)(Z): -1.3 (ref -3–3)
CLOT ANGLE P HPASE BLD TEG: 66.7 DEGREES (ref 53–72)
CLOT INIT P HPASE BLD TEG: 8.5 MINUTE (ref 5–10)
CLOT STRENGTH P HPASE BLD TEG: 7.8 KD/SC (ref 4.5–11)
CO2 SERPL-SCNC: 30 MMOL/L (ref 22–29)
CODING SYSTEM: NORMAL
COLOR UR AUTO: YELLOW
CPB POCT: NO
CREAT SERPL-MCNC: 1.25 MG/DL (ref 0.51–0.95)
CREAT SERPL-MCNC: 1.3 MG/DL (ref 0.51–0.95)
CREAT SERPL-MCNC: 1.34 MG/DL (ref 0.51–0.95)
CREAT SERPL-MCNC: 1.46 MG/DL (ref 0.51–0.95)
CREAT SERPL-MCNC: 1.67 MG/DL (ref 0.51–0.95)
CREAT SERPL-MCNC: 1.83 MG/DL (ref 0.51–0.95)
CREAT SERPL-MCNC: 1.91 MG/DL (ref 0.51–0.95)
DIASTOLIC BLOOD PRESSURE - MUSE: NORMAL MMHG
EGFRCR SERPLBLD CKD-EPI 2021: 27 ML/MIN/1.73M2
EGFRCR SERPLBLD CKD-EPI 2021: 28 ML/MIN/1.73M2
EGFRCR SERPLBLD CKD-EPI 2021: 31 ML/MIN/1.73M2
EGFRCR SERPLBLD CKD-EPI 2021: 37 ML/MIN/1.73M2
EGFRCR SERPLBLD CKD-EPI 2021: 41 ML/MIN/1.73M2
EGFRCR SERPLBLD CKD-EPI 2021: 42 ML/MIN/1.73M2
EGFRCR SERPLBLD CKD-EPI 2021: 44 ML/MIN/1.73M2
ELLIPTOCYTES BLD QL SMEAR: SLIGHT
EOSINOPHIL # BLD MANUAL: 0 10E3/UL (ref 0–0.7)
EOSINOPHIL NFR BLD MANUAL: 0 %
ERYTHROCYTE [DISTWIDTH] IN BLOOD BY AUTOMATED COUNT: 19.7 % (ref 10–15)
ERYTHROCYTE [DISTWIDTH] IN BLOOD BY AUTOMATED COUNT: 19.8 % (ref 10–15)
ERYTHROCYTE [DISTWIDTH] IN BLOOD BY AUTOMATED COUNT: 19.9 % (ref 10–15)
ERYTHROCYTE [DISTWIDTH] IN BLOOD BY AUTOMATED COUNT: 20.3 % (ref 10–15)
ERYTHROCYTE [DISTWIDTH] IN BLOOD BY AUTOMATED COUNT: 20.4 % (ref 10–15)
EST. AVERAGE GLUCOSE BLD GHB EST-MCNC: 114 MG/DL
FIBRINOGEN PPP-MCNC: 289 MG/DL (ref 170–510)
FIBRINOGEN PPP-MCNC: 359 MG/DL (ref 170–510)
FLUAV RNA SPEC QL NAA+PROBE: NEGATIVE
FLUBV RNA RESP QL NAA+PROBE: NEGATIVE
GLUCOSE BLD-MCNC: 216 MG/DL (ref 70–99)
GLUCOSE BLD-MCNC: 250 MG/DL (ref 70–99)
GLUCOSE BLD-MCNC: 286 MG/DL (ref 70–99)
GLUCOSE BLD-MCNC: 324 MG/DL (ref 70–99)
GLUCOSE BLD-MCNC: 368 MG/DL (ref 70–99)
GLUCOSE BLDC GLUCOMTR-MCNC: 109 MG/DL (ref 70–99)
GLUCOSE BLDC GLUCOMTR-MCNC: 112 MG/DL (ref 70–99)
GLUCOSE BLDC GLUCOMTR-MCNC: 123 MG/DL (ref 70–99)
GLUCOSE BLDC GLUCOMTR-MCNC: 129 MG/DL (ref 70–99)
GLUCOSE BLDC GLUCOMTR-MCNC: 130 MG/DL (ref 70–99)
GLUCOSE BLDC GLUCOMTR-MCNC: 160 MG/DL (ref 70–99)
GLUCOSE BLDC GLUCOMTR-MCNC: 255 MG/DL (ref 70–99)
GLUCOSE BLDC GLUCOMTR-MCNC: 271 MG/DL (ref 70–99)
GLUCOSE BLDC GLUCOMTR-MCNC: 291 MG/DL (ref 70–99)
GLUCOSE BLDC GLUCOMTR-MCNC: 323 MG/DL (ref 70–99)
GLUCOSE BLDC GLUCOMTR-MCNC: 325 MG/DL (ref 70–99)
GLUCOSE BLDC GLUCOMTR-MCNC: 360 MG/DL (ref 70–99)
GLUCOSE BLDC GLUCOMTR-MCNC: 366 MG/DL (ref 70–99)
GLUCOSE BLDC GLUCOMTR-MCNC: 55 MG/DL (ref 70–99)
GLUCOSE BLDC GLUCOMTR-MCNC: 64 MG/DL (ref 70–99)
GLUCOSE BLDC GLUCOMTR-MCNC: 96 MG/DL (ref 70–99)
GLUCOSE BLDC GLUCOMTR-MCNC: 99 MG/DL (ref 70–99)
GLUCOSE SERPL-MCNC: 139 MG/DL (ref 70–99)
GLUCOSE SERPL-MCNC: 140 MG/DL (ref 70–99)
GLUCOSE SERPL-MCNC: 197 MG/DL (ref 70–99)
GLUCOSE SERPL-MCNC: 225 MG/DL (ref 70–99)
GLUCOSE SERPL-MCNC: 347 MG/DL (ref 70–99)
GLUCOSE SERPL-MCNC: 407 MG/DL (ref 70–99)
GLUCOSE SERPL-MCNC: 63 MG/DL (ref 70–99)
GLUCOSE UR STRIP-MCNC: 300 MG/DL
HBA1C MFR BLD: 5.6 %
HCO3 BLD-SCNC: 10 MMOL/L (ref 21–28)
HCO3 BLD-SCNC: 10 MMOL/L (ref 21–28)
HCO3 BLD-SCNC: 12 MMOL/L (ref 21–28)
HCO3 BLD-SCNC: 12 MMOL/L (ref 21–28)
HCO3 BLD-SCNC: 29 MMOL/L (ref 21–28)
HCO3 BLD-SCNC: 7 MMOL/L (ref 21–28)
HCO3 BLD-SCNC: 8 MMOL/L (ref 21–28)
HCO3 BLD-SCNC: 8 MMOL/L (ref 21–28)
HCO3 BLD-SCNC: 9 MMOL/L (ref 21–28)
HCO3 BLDA-SCNC: 13 MMOL/L (ref 21–28)
HCO3 BLDA-SCNC: 15 MMOL/L (ref 21–28)
HCO3 BLDA-SCNC: 16 MMOL/L (ref 21–28)
HCO3 BLDA-SCNC: 21 MMOL/L (ref 21–28)
HCO3 BLDV-SCNC: 11 MMOL/L (ref 21–28)
HCO3 BLDV-SCNC: 13 MMOL/L (ref 21–28)
HCO3 BLDV-SCNC: 14 MMOL/L (ref 21–28)
HCO3 BLDV-SCNC: 15 MMOL/L (ref 21–28)
HCO3 BLDV-SCNC: 16 MMOL/L (ref 21–28)
HCO3 BLDV-SCNC: 16 MMOL/L (ref 21–28)
HCO3 BLDV-SCNC: 23 MMOL/L (ref 21–28)
HCO3 SERPL-SCNC: 11 MMOL/L (ref 22–29)
HCO3 SERPL-SCNC: 13 MMOL/L (ref 22–29)
HCO3 SERPL-SCNC: 13 MMOL/L (ref 22–29)
HCO3 SERPL-SCNC: 19 MMOL/L (ref 22–29)
HCO3 SERPL-SCNC: 25 MMOL/L (ref 22–29)
HCO3 SERPL-SCNC: 6 MMOL/L (ref 22–29)
HCO3 SERPL-SCNC: 8 MMOL/L (ref 22–29)
HCT VFR BLD AUTO: 27.6 % (ref 35–47)
HCT VFR BLD AUTO: 28.8 % (ref 35–47)
HCT VFR BLD AUTO: 30.8 % (ref 35–47)
HCT VFR BLD AUTO: 31.5 % (ref 35–47)
HCT VFR BLD AUTO: 31.8 % (ref 35–47)
HCT VFR BLD AUTO: 33.9 % (ref 35–47)
HCT VFR BLD AUTO: 35.1 % (ref 35–47)
HCT VFR BLD CALC: 33 % (ref 35–47)
HGB BLD-MCNC: 10 G/DL (ref 11.7–15.7)
HGB BLD-MCNC: 10.3 G/DL (ref 11.7–15.7)
HGB BLD-MCNC: 11.2 G/DL (ref 11.7–15.7)
HGB BLD-MCNC: 7.7 G/DL (ref 11.7–15.7)
HGB BLD-MCNC: 7.8 G/DL (ref 11.7–15.7)
HGB BLD-MCNC: 8.3 G/DL (ref 11.7–15.7)
HGB BLD-MCNC: 8.3 G/DL (ref 11.7–15.7)
HGB BLD-MCNC: 8.4 G/DL (ref 11.7–15.7)
HGB BLD-MCNC: 8.7 G/DL (ref 11.7–15.7)
HGB BLD-MCNC: 8.9 G/DL (ref 11.7–15.7)
HGB BLD-MCNC: 9 G/DL (ref 11.7–15.7)
HGB BLD-MCNC: 9.2 G/DL (ref 11.7–15.7)
HGB BLD-MCNC: 9.4 G/DL (ref 11.7–15.7)
HGB UR QL STRIP: ABNORMAL
INR PPP: 1.14 (ref 0.85–1.15)
INR PPP: 2.02 (ref 0.85–1.15)
INR PPP: 2.4 (ref 0.85–1.15)
INR PPP: 2.73 (ref 0.85–1.15)
INR PPP: 3.19 (ref 0.85–1.15)
INR PPP: 4.31 (ref 0.85–1.15)
INTERPRETATION ECG - MUSE: NORMAL
ISSUE DATE AND TIME: NORMAL
KETONES UR STRIP-MCNC: NEGATIVE MG/DL
LACTATE BLD-SCNC: 5.1 MMOL/L (ref 0.7–2)
LACTATE BLD-SCNC: 6.2 MMOL/L (ref 0.7–2)
LACTATE BLD-SCNC: 7.6 MMOL/L (ref 0.7–2)
LACTATE BLD-SCNC: 8.6 MMOL/L (ref 0.7–2)
LACTATE SERPL-SCNC: 11.6 MMOL/L (ref 0.7–2)
LACTATE SERPL-SCNC: 11.7 MMOL/L (ref 0.7–2)
LACTATE SERPL-SCNC: 11.9 MMOL/L (ref 0.7–2)
LACTATE SERPL-SCNC: 12.8 MMOL/L (ref 0.7–2)
LACTATE SERPL-SCNC: 14.5 MMOL/L (ref 0.7–2)
LACTATE SERPL-SCNC: 15 MMOL/L (ref 0.7–2)
LACTATE SERPL-SCNC: 16 MMOL/L (ref 0.7–2)
LACTATE SERPL-SCNC: 17 MMOL/L (ref 0.7–2)
LACTATE SERPL-SCNC: 17 MMOL/L (ref 0.7–2)
LACTATE SERPL-SCNC: 19 MMOL/L (ref 0.7–2)
LACTATE SERPL-SCNC: 22 MMOL/L (ref 0.7–2)
LACTATE SERPL-SCNC: 4.4 MMOL/L (ref 0.7–2)
LEUKOCYTE ESTERASE UR QL STRIP: ABNORMAL
LVEF ECHO: NORMAL
LYMPHOCYTES # BLD MANUAL: 3.2 10E3/UL (ref 0.8–5.3)
LYMPHOCYTES NFR BLD MANUAL: 11 %
MAGNESIUM SERPL-MCNC: 1.8 MG/DL (ref 1.7–2.3)
MAGNESIUM SERPL-MCNC: 2.4 MG/DL (ref 1.7–2.3)
MAGNESIUM SERPL-MCNC: 2.4 MG/DL (ref 1.7–2.3)
MAGNESIUM SERPL-MCNC: 2.6 MG/DL (ref 1.7–2.3)
MAGNESIUM SERPL-MCNC: 5 MG/DL (ref 1.7–2.3)
MCF P HPASE BLD TEG: 60.9 MM (ref 50–70)
MCH RBC QN AUTO: 29.8 PG (ref 26.5–33)
MCH RBC QN AUTO: 29.9 PG (ref 26.5–33)
MCH RBC QN AUTO: 30 PG (ref 26.5–33)
MCH RBC QN AUTO: 30.1 PG (ref 26.5–33)
MCH RBC QN AUTO: 30.2 PG (ref 26.5–33)
MCH RBC QN AUTO: 30.3 PG (ref 26.5–33)
MCH RBC QN AUTO: 30.4 PG (ref 26.5–33)
MCHC RBC AUTO-ENTMCNC: 28.2 G/DL (ref 31.5–36.5)
MCHC RBC AUTO-ENTMCNC: 28.3 G/DL (ref 31.5–36.5)
MCHC RBC AUTO-ENTMCNC: 28.9 G/DL (ref 31.5–36.5)
MCHC RBC AUTO-ENTMCNC: 29.3 G/DL (ref 31.5–36.5)
MCHC RBC AUTO-ENTMCNC: 29.5 G/DL (ref 31.5–36.5)
MCHC RBC AUTO-ENTMCNC: 29.8 G/DL (ref 31.5–36.5)
MCHC RBC AUTO-ENTMCNC: 31.3 G/DL (ref 31.5–36.5)
MCV RBC AUTO: 102 FL (ref 78–100)
MCV RBC AUTO: 102 FL (ref 78–100)
MCV RBC AUTO: 104 FL (ref 78–100)
MCV RBC AUTO: 104 FL (ref 78–100)
MCV RBC AUTO: 105 FL (ref 78–100)
MCV RBC AUTO: 107 FL (ref 78–100)
MCV RBC AUTO: 96 FL (ref 78–100)
METAMYELOCYTES # BLD MANUAL: 0.5 10E3/UL
METAMYELOCYTES NFR BLD MANUAL: 2 %
MONOCYTES # BLD MANUAL: 0.7 10E3/UL (ref 0–1.3)
MONOCYTES NFR BLD MANUAL: 3 %
MUCOUS THREADS #/AREA URNS LPF: PRESENT /LPF
MYELOCYTES # BLD MANUAL: 0.7 10E3/UL
MYELOCYTES NFR BLD MANUAL: 3 %
NEUTROPHILS # BLD MANUAL: 23.8 10E3/UL (ref 1.6–8.3)
NEUTROPHILS NFR BLD MANUAL: 82 %
NITRATE UR QL: NEGATIVE
O2/TOTAL GAS SETTING VFR VENT: 100 %
O2/TOTAL GAS SETTING VFR VENT: 40 %
O2/TOTAL GAS SETTING VFR VENT: 60 %
OXYHGB MFR BLDA: 70 % (ref 92–100)
OXYHGB MFR BLDA: 85 % (ref 92–100)
OXYHGB MFR BLDA: 88 % (ref 92–100)
OXYHGB MFR BLDA: 93 % (ref 92–100)
OXYHGB MFR BLDA: 97 % (ref 92–100)
OXYHGB MFR BLDA: 98 % (ref 92–100)
OXYHGB MFR BLDA: 98 % (ref 92–100)
OXYHGB MFR BLDA: 99 % (ref 92–100)
OXYHGB MFR BLDA: 99 % (ref 92–100)
OXYHGB MFR BLDV: 30 % (ref 70–75)
OXYHGB MFR BLDV: 35 % (ref 70–75)
OXYHGB MFR BLDV: 35 % (ref 70–75)
OXYHGB MFR BLDV: 38 % (ref 70–75)
OXYHGB MFR BLDV: 40 % (ref 70–75)
OXYHGB MFR BLDV: 41 % (ref 70–75)
OXYHGB MFR BLDV: 41 % (ref 70–75)
OXYHGB MFR BLDV: 44 % (ref 70–75)
OXYHGB MFR BLDV: 48 % (ref 70–75)
OXYHGB MFR BLDV: 49 % (ref 70–75)
P AXIS - MUSE: NORMAL DEGREES
PCO2 BLD: 20 MM HG (ref 35–45)
PCO2 BLD: 22 MM HG (ref 35–45)
PCO2 BLD: 23 MM HG (ref 35–45)
PCO2 BLD: 24 MM HG (ref 35–45)
PCO2 BLD: 25 MM HG (ref 35–45)
PCO2 BLD: 33 MM HG (ref 35–45)
PCO2 BLD: 38 MM HG (ref 35–45)
PCO2 BLDA: 40 MM HG (ref 35–45)
PCO2 BLDA: 45 MM HG (ref 35–45)
PCO2 BLDA: 53 MM HG (ref 35–45)
PCO2 BLDA: 74 MM HG (ref 35–45)
PCO2 BLDV: 36 MM HG (ref 40–50)
PCO2 BLDV: 37 MM HG (ref 40–50)
PCO2 BLDV: 37 MM HG (ref 40–50)
PCO2 BLDV: 38 MM HG (ref 40–50)
PCO2 BLDV: 38 MM HG (ref 40–50)
PCO2 BLDV: 39 MM HG (ref 40–50)
PCO2 BLDV: 48 MM HG (ref 40–50)
PCO2 BLDV: 51 MM HG (ref 40–50)
PEEP: 5 CM H2O
PEEP: 5 CM H2O
PH BLD: 7.14 [PH] (ref 7.35–7.45)
PH BLD: 7.15 [PH] (ref 7.35–7.45)
PH BLD: 7.18 [PH] (ref 7.35–7.45)
PH BLD: 7.2 [PH] (ref 7.35–7.45)
PH BLD: 7.23 [PH] (ref 7.35–7.45)
PH BLD: 7.23 [PH] (ref 7.35–7.45)
PH BLD: 7.26 [PH] (ref 7.35–7.45)
PH BLD: 7.29 [PH] (ref 7.35–7.45)
PH BLD: 7.49 [PH] (ref 7.35–7.45)
PH BLDA: 7.05 [PH] (ref 7.35–7.45)
PH BLDA: 7.08 [PH] (ref 7.35–7.45)
PH BLDA: 7.1 [PH] (ref 7.35–7.45)
PH BLDA: 7.13 [PH] (ref 7.35–7.45)
PH BLDV: 7.07 [PH] (ref 7.32–7.43)
PH BLDV: 7.08 [PH] (ref 7.32–7.43)
PH BLDV: 7.16 [PH] (ref 7.32–7.43)
PH BLDV: 7.17 [PH] (ref 7.32–7.43)
PH BLDV: 7.18 [PH] (ref 7.32–7.43)
PH BLDV: 7.19 [PH] (ref 7.32–7.43)
PH BLDV: 7.22 [PH] (ref 7.32–7.43)
PH BLDV: 7.24 [PH] (ref 7.32–7.43)
PH BLDV: 7.25 [PH] (ref 7.32–7.43)
PH BLDV: 7.26 [PH] (ref 7.32–7.43)
PH UR STRIP: 8 [PH] (ref 5–7)
PHOSPHATE SERPL-MCNC: 4.5 MG/DL (ref 2.5–4.5)
PHOSPHATE SERPL-MCNC: 4.5 MG/DL (ref 2.5–4.5)
PHOSPHATE SERPL-MCNC: 5.7 MG/DL (ref 2.5–4.5)
PHOSPHATE SERPL-MCNC: 6.2 MG/DL (ref 2.5–4.5)
PHOSPHATE SERPL-MCNC: 8.4 MG/DL (ref 2.5–4.5)
PLAT MORPH BLD: ABNORMAL
PLATELET # BLD AUTO: 110 10E3/UL (ref 150–450)
PLATELET # BLD AUTO: 178 10E3/UL (ref 150–450)
PLATELET # BLD AUTO: 268 10E3/UL (ref 150–450)
PLATELET # BLD AUTO: 315 10E3/UL (ref 150–450)
PLATELET # BLD AUTO: 372 10E3/UL (ref 150–450)
PLATELET # BLD AUTO: 377 10E3/UL (ref 150–450)
PLATELET # BLD AUTO: 433 10E3/UL (ref 150–450)
PO2 BLD: 117 MM HG (ref 80–105)
PO2 BLD: 121 MM HG (ref 80–105)
PO2 BLD: 121 MM HG (ref 80–105)
PO2 BLD: 139 MM HG (ref 80–105)
PO2 BLD: 166 MM HG (ref 80–105)
PO2 BLD: 168 MM HG (ref 80–105)
PO2 BLD: 181 MM HG (ref 80–105)
PO2 BLD: 194 MM HG (ref 80–105)
PO2 BLD: 79 MM HG (ref 80–105)
PO2 BLDA: 257 MM HG (ref 80–105)
PO2 BLDA: 53 MM HG (ref 80–105)
PO2 BLDA: 68 MM HG (ref 80–105)
PO2 BLDA: 71 MM HG (ref 80–105)
PO2 BLDV: 18 MM HG (ref 25–47)
PO2 BLDV: 26 MM HG (ref 25–47)
PO2 BLDV: 27 MM HG (ref 25–47)
PO2 BLDV: 27 MM HG (ref 25–47)
PO2 BLDV: 32 MM HG (ref 25–47)
PO2 BLDV: 32 MM HG (ref 25–47)
PO2 BLDV: 33 MM HG (ref 25–47)
PO2 BLDV: 36 MM HG (ref 25–47)
POLYCHROMASIA BLD QL SMEAR: SLIGHT
POTASSIUM BLD-SCNC: 3.5 MMOL/L (ref 3.4–5.3)
POTASSIUM BLD-SCNC: 3.6 MMOL/L (ref 3.4–5.3)
POTASSIUM BLD-SCNC: 3.8 MMOL/L (ref 3.4–5.3)
POTASSIUM BLD-SCNC: 4 MMOL/L (ref 3.4–5.3)
POTASSIUM BLD-SCNC: 4.1 MMOL/L (ref 3.4–5.3)
POTASSIUM BLD-SCNC: 4.2 MMOL/L (ref 3.4–5.3)
POTASSIUM BLD-SCNC: 4.8 MMOL/L (ref 3.4–5.3)
POTASSIUM SERPL-SCNC: 3.6 MMOL/L (ref 3.4–5.3)
POTASSIUM SERPL-SCNC: 3.8 MMOL/L (ref 3.4–5.3)
POTASSIUM SERPL-SCNC: 4.2 MMOL/L (ref 3.4–5.3)
POTASSIUM SERPL-SCNC: 4.5 MMOL/L (ref 3.4–5.3)
POTASSIUM SERPL-SCNC: 4.5 MMOL/L (ref 3.4–5.3)
POTASSIUM SERPL-SCNC: 5 MMOL/L (ref 3.4–5.3)
POTASSIUM SERPL-SCNC: 5.6 MMOL/L (ref 3.4–5.3)
PR INTERVAL - MUSE: NORMAL MS
PROT SERPL-MCNC: 3.9 G/DL (ref 6.4–8.3)
PROT SERPL-MCNC: 4.2 G/DL (ref 6.4–8.3)
PROT SERPL-MCNC: 4.3 G/DL (ref 6.4–8.3)
PROT SERPL-MCNC: 4.5 G/DL (ref 6.4–8.3)
PROT SERPL-MCNC: 4.8 G/DL (ref 6.4–8.3)
PROT SERPL-MCNC: 5.9 G/DL (ref 6.4–8.3)
PROTHROMBIN TIME: 14.9 SECONDS (ref 11.8–14.8)
PROTHROMBIN TIME: 23 SECONDS (ref 11.8–14.8)
PROTHROMBIN TIME: 26.2 SECONDS (ref 11.8–14.8)
PROTHROMBIN TIME: 28.9 SECONDS (ref 11.8–14.8)
PROTHROMBIN TIME: 32.5 SECONDS (ref 11.8–14.8)
PROTHROMBIN TIME: 40.8 SECONDS (ref 11.8–14.8)
QRS DURATION - MUSE: 74 MS
QT - MUSE: 476 MS
QTC - MUSE: 446 MS
R AXIS - MUSE: 45 DEGREES
RBC # BLD AUTO: 2.62 10E6/UL (ref 3.8–5.2)
RBC # BLD AUTO: 2.88 10E6/UL (ref 3.8–5.2)
RBC # BLD AUTO: 2.99 10E6/UL (ref 3.8–5.2)
RBC # BLD AUTO: 3.07 10E6/UL (ref 3.8–5.2)
RBC # BLD AUTO: 3.1 10E6/UL (ref 3.8–5.2)
RBC # BLD AUTO: 3.34 10E6/UL (ref 3.8–5.2)
RBC # BLD AUTO: 3.39 10E6/UL (ref 3.8–5.2)
RBC MORPH BLD: ABNORMAL
RBC URINE: 19 /HPF
RSV RNA SPEC NAA+PROBE: NEGATIVE
SAO2 % BLDA: 100 % (ref 95–96)
SAO2 % BLDA: 71 % (ref 95–96)
SAO2 % BLDA: 87 % (ref 95–96)
SAO2 % BLDA: 90 % (ref 95–96)
SAO2 % BLDA: 94.6 % (ref 95–96)
SAO2 % BLDA: 98.9 % (ref 95–96)
SAO2 % BLDA: 99.1 % (ref 95–96)
SAO2 % BLDA: 99.2 % (ref 95–96)
SAO2 % BLDA: 99.3 % (ref 95–96)
SAO2 % BLDA: 99.8 % (ref 95–96)
SAO2 % BLDA: 99.9 % (ref 95–96)
SAO2 % BLDA: >100 % (ref 95–96)
SAO2 % BLDA: >100 % (ref 95–96)
SAO2 % BLDV: 20 % (ref 70–75)
SAO2 % BLDV: 30.4 % (ref 70–75)
SAO2 % BLDV: 35.1 % (ref 70–75)
SAO2 % BLDV: 38.7 % (ref 70–75)
SAO2 % BLDV: 40.4 % (ref 70–75)
SAO2 % BLDV: 42.1 % (ref 70–75)
SAO2 % BLDV: 42.2 % (ref 70–75)
SAO2 % BLDV: 44.7 % (ref 70–75)
SAO2 % BLDV: 49.2 % (ref 70–75)
SAO2 % BLDV: 50.3 % (ref 70–75)
SARS-COV-2 RNA RESP QL NAA+PROBE: NEGATIVE
SODIUM BLD-SCNC: 136 MMOL/L (ref 135–145)
SODIUM BLD-SCNC: 138 MMOL/L (ref 135–145)
SODIUM BLD-SCNC: 138 MMOL/L (ref 135–145)
SODIUM BLD-SCNC: 142 MMOL/L (ref 135–145)
SODIUM BLD-SCNC: 145 MMOL/L (ref 135–145)
SODIUM SERPL-SCNC: 138 MMOL/L (ref 135–145)
SODIUM SERPL-SCNC: 145 MMOL/L (ref 135–145)
SODIUM SERPL-SCNC: 146 MMOL/L (ref 135–145)
SODIUM SERPL-SCNC: 148 MMOL/L (ref 135–145)
SODIUM SERPL-SCNC: 151 MMOL/L (ref 135–145)
SODIUM SERPL-SCNC: 151 MMOL/L (ref 135–145)
SODIUM SERPL-SCNC: 165 MMOL/L (ref 135–145)
SODIUM SERPL-SCNC: 167 MMOL/L (ref 135–145)
SP GR UR STRIP: 1.02 (ref 1–1.03)
SQUAMOUS EPITHELIAL: 5 /HPF
SYSTOLIC BLOOD PRESSURE - MUSE: NORMAL MMHG
T AXIS - MUSE: 84 DEGREES
T4 FREE SERPL-MCNC: 1.08 NG/DL (ref 0.9–1.7)
TACROLIMUS BLD-MCNC: 5.6 UG/L (ref 5–15)
TACROLIMUS BLD-MCNC: 6.7 UG/L (ref 5–15)
TME LAST DOSE: NORMAL H
TRANSITIONAL EPI: 13 /HPF
TROPONIN T SERPL HS-MCNC: 1665 NG/L
TROPONIN T SERPL HS-MCNC: 3519 NG/L
TROPONIN T SERPL HS-MCNC: 527 NG/L
TROPONIN T SERPL HS-MCNC: 6282 NG/L
TROPONIN T SERPL HS-MCNC: 9315 NG/L
TROPONIN T SERPL HS-MCNC: 98 NG/L
TROPONIN T SERPL HS-MCNC: ABNORMAL NG/L
TROPONIN T SERPL HS-MCNC: ABNORMAL NG/L
TSH SERPL DL<=0.005 MIU/L-ACNC: 12.1 UIU/ML (ref 0.3–4.2)
UNIT ABO/RH: NORMAL
UNIT NUMBER: NORMAL
UNIT STATUS: NORMAL
UNIT TYPE ISBT: 5100
UROBILINOGEN UR STRIP-MCNC: NORMAL MG/DL
VENTRICULAR RATE- MUSE: 53 BPM
WBC # BLD AUTO: 23.7 10E3/UL (ref 4–11)
WBC # BLD AUTO: 26 10E3/UL (ref 4–11)
WBC # BLD AUTO: 27.5 10E3/UL (ref 4–11)
WBC # BLD AUTO: 27.5 10E3/UL (ref 4–11)
WBC # BLD AUTO: 28.7 10E3/UL (ref 4–11)
WBC # BLD AUTO: 28.9 10E3/UL (ref 4–11)
WBC # BLD AUTO: 35.4 10E3/UL (ref 4–11)
WBC URINE: >182 /HPF

## 2025-01-01 PROCEDURE — 258N000003 HC RX IP 258 OP 636: Performed by: STUDENT IN AN ORGANIZED HEALTH CARE EDUCATION/TRAINING PROGRAM

## 2025-01-01 PROCEDURE — 250N000011 HC RX IP 250 OP 636: Performed by: INTERNAL MEDICINE

## 2025-01-01 PROCEDURE — 82805 BLOOD GASES W/O2 SATURATION: CPT

## 2025-01-01 PROCEDURE — 84484 ASSAY OF TROPONIN QUANT: CPT | Performed by: STUDENT IN AN ORGANIZED HEALTH CARE EDUCATION/TRAINING PROGRAM

## 2025-01-01 PROCEDURE — 250N000013 HC RX MED GY IP 250 OP 250 PS 637: Performed by: STUDENT IN AN ORGANIZED HEALTH CARE EDUCATION/TRAINING PROGRAM

## 2025-01-01 PROCEDURE — 36620 INSERTION CATHETER ARTERY: CPT | Mod: GC | Performed by: INTERNAL MEDICINE

## 2025-01-01 PROCEDURE — C1730 CATH, EP, 19 OR FEW ELECT: HCPCS | Performed by: INTERNAL MEDICINE

## 2025-01-01 PROCEDURE — 4A023N6 MEASUREMENT OF CARDIAC SAMPLING AND PRESSURE, RIGHT HEART, PERCUTANEOUS APPROACH: ICD-10-PCS | Performed by: INTERNAL MEDICINE

## 2025-01-01 PROCEDURE — 82330 ASSAY OF CALCIUM: CPT | Performed by: INTERNAL MEDICINE

## 2025-01-01 PROCEDURE — 83605 ASSAY OF LACTIC ACID: CPT | Performed by: INTERNAL MEDICINE

## 2025-01-01 PROCEDURE — 200N000002 HC R&B ICU UMMC

## 2025-01-01 PROCEDURE — 94799 UNLISTED PULMONARY SVC/PX: CPT

## 2025-01-01 PROCEDURE — 999N000077 HC STATISTIC INSERT IABP

## 2025-01-01 PROCEDURE — C1894 INTRO/SHEATH, NON-LASER: HCPCS | Performed by: INTERNAL MEDICINE

## 2025-01-01 PROCEDURE — 85730 THROMBOPLASTIN TIME PARTIAL: CPT | Performed by: INTERNAL MEDICINE

## 2025-01-01 PROCEDURE — 82330 ASSAY OF CALCIUM: CPT

## 2025-01-01 PROCEDURE — 250N000011 HC RX IP 250 OP 636

## 2025-01-01 PROCEDURE — 71045 X-RAY EXAM CHEST 1 VIEW: CPT | Mod: 26 | Performed by: RADIOLOGY

## 2025-01-01 PROCEDURE — 250N000011 HC RX IP 250 OP 636: Performed by: EMERGENCY MEDICINE

## 2025-01-01 PROCEDURE — C1769 GUIDE WIRE: HCPCS | Performed by: INTERNAL MEDICINE

## 2025-01-01 PROCEDURE — 99285 EMERGENCY DEPT VISIT HI MDM: CPT | Mod: 25 | Performed by: EMERGENCY MEDICINE

## 2025-01-01 PROCEDURE — 999N000065 XR ABDOMEN PORT 1 VIEW

## 2025-01-01 PROCEDURE — 36415 COLL VENOUS BLD VENIPUNCTURE: CPT | Performed by: INTERNAL MEDICINE

## 2025-01-01 PROCEDURE — 87637 SARSCOV2&INF A&B&RSV AMP PRB: CPT | Performed by: STUDENT IN AN ORGANIZED HEALTH CARE EDUCATION/TRAINING PROGRAM

## 2025-01-01 PROCEDURE — 82310 ASSAY OF CALCIUM: CPT

## 2025-01-01 PROCEDURE — 83735 ASSAY OF MAGNESIUM: CPT

## 2025-01-01 PROCEDURE — 258N000003 HC RX IP 258 OP 636: Performed by: INTERNAL MEDICINE

## 2025-01-01 PROCEDURE — 93005 ELECTROCARDIOGRAM TRACING: CPT

## 2025-01-01 PROCEDURE — 85014 HEMATOCRIT: CPT

## 2025-01-01 PROCEDURE — 71045 X-RAY EXAM CHEST 1 VIEW: CPT

## 2025-01-01 PROCEDURE — 5A2204Z RESTORATION OF CARDIAC RHYTHM, SINGLE: ICD-10-PCS | Performed by: INTERNAL MEDICINE

## 2025-01-01 PROCEDURE — 82805 BLOOD GASES W/O2 SATURATION: CPT | Performed by: STUDENT IN AN ORGANIZED HEALTH CARE EDUCATION/TRAINING PROGRAM

## 2025-01-01 PROCEDURE — 80051 ELECTROLYTE PANEL: CPT | Performed by: INTERNAL MEDICINE

## 2025-01-01 PROCEDURE — 4A1239Z MONITORING OF CARDIAC OUTPUT, PERCUTANEOUS APPROACH: ICD-10-PCS | Performed by: INTERNAL MEDICINE

## 2025-01-01 PROCEDURE — 33210 INSERT ELECTRD/PM CATH SNGL: CPT | Performed by: INTERNAL MEDICINE

## 2025-01-01 PROCEDURE — 83605 ASSAY OF LACTIC ACID: CPT | Performed by: EMERGENCY MEDICINE

## 2025-01-01 PROCEDURE — 80048 BASIC METABOLIC PNL TOTAL CA: CPT | Performed by: INTERNAL MEDICINE

## 2025-01-01 PROCEDURE — 85007 BL SMEAR W/DIFF WBC COUNT: CPT | Performed by: EMERGENCY MEDICINE

## 2025-01-01 PROCEDURE — 272N000001 HC OR GENERAL SUPPLY STERILE: Performed by: INTERNAL MEDICINE

## 2025-01-01 PROCEDURE — 85014 HEMATOCRIT: CPT | Performed by: EMERGENCY MEDICINE

## 2025-01-01 PROCEDURE — 94003 VENT MGMT INPAT SUBQ DAY: CPT

## 2025-01-01 PROCEDURE — 258N000001 HC RX 258: Performed by: STUDENT IN AN ORGANIZED HEALTH CARE EDUCATION/TRAINING PROGRAM

## 2025-01-01 PROCEDURE — 370N000003 HC ANESTHESIA WARD SERVICE: Performed by: ANESTHESIOLOGY

## 2025-01-01 PROCEDURE — 84443 ASSAY THYROID STIM HORMONE: CPT | Performed by: EMERGENCY MEDICINE

## 2025-01-01 PROCEDURE — 85018 HEMOGLOBIN: CPT

## 2025-01-01 PROCEDURE — 99291 CRITICAL CARE FIRST HOUR: CPT | Mod: 24 | Performed by: INTERNAL MEDICINE

## 2025-01-01 PROCEDURE — 999N000075 HC STATISTIC IABP MONITORING

## 2025-01-01 PROCEDURE — 99291 CRITICAL CARE FIRST HOUR: CPT | Performed by: EMERGENCY MEDICINE

## 2025-01-01 PROCEDURE — 84155 ASSAY OF PROTEIN SERUM: CPT

## 2025-01-01 PROCEDURE — 83036 HEMOGLOBIN GLYCOSYLATED A1C: CPT | Performed by: STUDENT IN AN ORGANIZED HEALTH CARE EDUCATION/TRAINING PROGRAM

## 2025-01-01 PROCEDURE — C1874 STENT, COATED/COV W/DEL SYS: HCPCS | Performed by: INTERNAL MEDICINE

## 2025-01-01 PROCEDURE — 84100 ASSAY OF PHOSPHORUS: CPT

## 2025-01-01 PROCEDURE — 83605 ASSAY OF LACTIC ACID: CPT

## 2025-01-01 PROCEDURE — 999N000185 HC STATISTIC TRANSPORT TIME EA 15 MIN

## 2025-01-01 PROCEDURE — 999N000026 HC STATISTIC CARDIOPULM RESUSCITATION

## 2025-01-01 PROCEDURE — 84484 ASSAY OF TROPONIN QUANT: CPT | Performed by: EMERGENCY MEDICINE

## 2025-01-01 PROCEDURE — 99152 MOD SED SAME PHYS/QHP 5/>YRS: CPT | Performed by: INTERNAL MEDICINE

## 2025-01-01 PROCEDURE — 250N000009 HC RX 250: Performed by: INTERNAL MEDICINE

## 2025-01-01 PROCEDURE — 5A1935Z RESPIRATORY VENTILATION, LESS THAN 24 CONSECUTIVE HOURS: ICD-10-PCS | Performed by: INTERNAL MEDICINE

## 2025-01-01 PROCEDURE — 999N000035 HC STATISTIC CODE BLUE NO ACCESS REQUIRED

## 2025-01-01 PROCEDURE — 85610 PROTHROMBIN TIME: CPT | Performed by: EMERGENCY MEDICINE

## 2025-01-01 PROCEDURE — 84439 ASSAY OF FREE THYROXINE: CPT | Performed by: EMERGENCY MEDICINE

## 2025-01-01 PROCEDURE — C1757 CATH, THROMBECTOMY/EMBOLECT: HCPCS | Performed by: INTERNAL MEDICINE

## 2025-01-01 PROCEDURE — 272N000766 HC IAB CATH AND INSERTION KIT, SENSATION

## 2025-01-01 PROCEDURE — 3E043XZ INTRODUCTION OF VASOPRESSOR INTO CENTRAL VEIN, PERCUTANEOUS APPROACH: ICD-10-PCS | Performed by: INTERNAL MEDICINE

## 2025-01-01 PROCEDURE — 87040 BLOOD CULTURE FOR BACTERIA: CPT | Performed by: INTERNAL MEDICINE

## 2025-01-01 PROCEDURE — 85384 FIBRINOGEN ACTIVITY: CPT | Performed by: INTERNAL MEDICINE

## 2025-01-01 PROCEDURE — 999N000248 HC STATISTIC IV INSERT WITH US BY RN

## 2025-01-01 PROCEDURE — 74018 RADEX ABDOMEN 1 VIEW: CPT | Mod: 26 | Performed by: RADIOLOGY

## 2025-01-01 PROCEDURE — 02HQ32Z INSERTION OF MONITORING DEVICE INTO RIGHT PULMONARY ARTERY, PERCUTANEOUS APPROACH: ICD-10-PCS | Performed by: INTERNAL MEDICINE

## 2025-01-01 PROCEDURE — 250N000009 HC RX 250: Performed by: STUDENT IN AN ORGANIZED HEALTH CARE EDUCATION/TRAINING PROGRAM

## 2025-01-01 PROCEDURE — 83605 ASSAY OF LACTIC ACID: CPT | Performed by: STUDENT IN AN ORGANIZED HEALTH CARE EDUCATION/TRAINING PROGRAM

## 2025-01-01 PROCEDURE — 80197 ASSAY OF TACROLIMUS: CPT | Performed by: STUDENT IN AN ORGANIZED HEALTH CARE EDUCATION/TRAINING PROGRAM

## 2025-01-01 PROCEDURE — 85610 PROTHROMBIN TIME: CPT | Performed by: INTERNAL MEDICINE

## 2025-01-01 PROCEDURE — 36415 COLL VENOUS BLD VENIPUNCTURE: CPT | Performed by: EMERGENCY MEDICINE

## 2025-01-01 PROCEDURE — 82805 BLOOD GASES W/O2 SATURATION: CPT | Performed by: INTERNAL MEDICINE

## 2025-01-01 PROCEDURE — C9606 PERC D-E COR REVASC W AMI S: HCPCS | Performed by: INTERNAL MEDICINE

## 2025-01-01 PROCEDURE — 02C03ZZ EXTIRPATION OF MATTER FROM CORONARY ARTERY, ONE ARTERY, PERCUTANEOUS APPROACH: ICD-10-PCS | Performed by: INTERNAL MEDICINE

## 2025-01-01 PROCEDURE — 96361 HYDRATE IV INFUSION ADD-ON: CPT | Performed by: EMERGENCY MEDICINE

## 2025-01-01 PROCEDURE — 258N000003 HC RX IP 258 OP 636

## 2025-01-01 PROCEDURE — 85396 CLOTTING ASSAY WHOLE BLOOD: CPT | Performed by: STUDENT IN AN ORGANIZED HEALTH CARE EDUCATION/TRAINING PROGRAM

## 2025-01-01 PROCEDURE — 93005 ELECTROCARDIOGRAM TRACING: CPT | Performed by: EMERGENCY MEDICINE

## 2025-01-01 PROCEDURE — 999N000157 HC STATISTIC RCP TIME EA 10 MIN

## 2025-01-01 PROCEDURE — 81001 URINALYSIS AUTO W/SCOPE: CPT | Performed by: EMERGENCY MEDICINE

## 2025-01-01 PROCEDURE — 93456 R HRT CORONARY ARTERY ANGIO: CPT | Performed by: INTERNAL MEDICINE

## 2025-01-01 PROCEDURE — 92921 HC PRQ TRLUML CORONARY ANGIOPLASTY ADDL BRANCH: CPT | Mod: RC | Performed by: INTERNAL MEDICINE

## 2025-01-01 PROCEDURE — 87086 URINE CULTURE/COLONY COUNT: CPT | Performed by: STUDENT IN AN ORGANIZED HEALTH CARE EDUCATION/TRAINING PROGRAM

## 2025-01-01 PROCEDURE — 4A133B3 MONITORING OF ARTERIAL PRESSURE, PULMONARY, PERCUTANEOUS APPROACH: ICD-10-PCS | Performed by: INTERNAL MEDICINE

## 2025-01-01 PROCEDURE — 82810 BLOOD GASES O2 SAT ONLY: CPT

## 2025-01-01 PROCEDURE — 84484 ASSAY OF TROPONIN QUANT: CPT

## 2025-01-01 PROCEDURE — 92920 PRQ TRLUML C ANGIOP 1ART&/BR: CPT | Performed by: INTERNAL MEDICINE

## 2025-01-01 PROCEDURE — 5A02210 ASSISTANCE WITH CARDIAC OUTPUT USING BALLOON PUMP, CONTINUOUS: ICD-10-PCS | Performed by: INTERNAL MEDICINE

## 2025-01-01 PROCEDURE — 84295 ASSAY OF SERUM SODIUM: CPT | Performed by: EMERGENCY MEDICINE

## 2025-01-01 PROCEDURE — 94002 VENT MGMT INPAT INIT DAY: CPT

## 2025-01-01 PROCEDURE — 99292 CRITICAL CARE ADDL 30 MIN: CPT | Mod: 24 | Performed by: INTERNAL MEDICINE

## 2025-01-01 PROCEDURE — 250N000011 HC RX IP 250 OP 636: Performed by: STUDENT IN AN ORGANIZED HEALTH CARE EDUCATION/TRAINING PROGRAM

## 2025-01-01 PROCEDURE — 93325 DOPPLER ECHO COLOR FLOW MAPG: CPT

## 2025-01-01 PROCEDURE — 027034Z DILATION OF CORONARY ARTERY, ONE ARTERY WITH DRUG-ELUTING INTRALUMINAL DEVICE, PERCUTANEOUS APPROACH: ICD-10-PCS | Performed by: INTERNAL MEDICINE

## 2025-01-01 PROCEDURE — 99153 MOD SED SAME PHYS/QHP EA: CPT | Performed by: INTERNAL MEDICINE

## 2025-01-01 PROCEDURE — C1725 CATH, TRANSLUMIN NON-LASER: HCPCS | Performed by: INTERNAL MEDICINE

## 2025-01-01 PROCEDURE — 93308 TTE F-UP OR LMTD: CPT | Mod: 26 | Performed by: STUDENT IN AN ORGANIZED HEALTH CARE EDUCATION/TRAINING PROGRAM

## 2025-01-01 PROCEDURE — 255N000002 HC RX 255 OP 636: Performed by: STUDENT IN AN ORGANIZED HEALTH CARE EDUCATION/TRAINING PROGRAM

## 2025-01-01 PROCEDURE — 83735 ASSAY OF MAGNESIUM: CPT | Performed by: INTERNAL MEDICINE

## 2025-01-01 PROCEDURE — 250N000013 HC RX MED GY IP 250 OP 250 PS 637: Performed by: INTERNAL MEDICINE

## 2025-01-01 PROCEDURE — 96374 THER/PROPH/DIAG INJ IV PUSH: CPT | Performed by: EMERGENCY MEDICINE

## 2025-01-01 PROCEDURE — 85027 COMPLETE CBC AUTOMATED: CPT

## 2025-01-01 PROCEDURE — 84484 ASSAY OF TROPONIN QUANT: CPT | Performed by: INTERNAL MEDICINE

## 2025-01-01 PROCEDURE — 33967 INSERT I-AORT PERCUT DEVICE: CPT | Performed by: INTERNAL MEDICINE

## 2025-01-01 PROCEDURE — 250N000013 HC RX MED GY IP 250 OP 250 PS 637: Performed by: EMERGENCY MEDICINE

## 2025-01-01 PROCEDURE — 85610 PROTHROMBIN TIME: CPT

## 2025-01-01 PROCEDURE — 250N000011 HC RX IP 250 OP 636: Mod: JZ | Performed by: STUDENT IN AN ORGANIZED HEALTH CARE EDUCATION/TRAINING PROGRAM

## 2025-01-01 PROCEDURE — 93321 DOPPLER ECHO F-UP/LMTD STD: CPT | Mod: 26 | Performed by: STUDENT IN AN ORGANIZED HEALTH CARE EDUCATION/TRAINING PROGRAM

## 2025-01-01 PROCEDURE — C1887 CATHETER, GUIDING: HCPCS | Performed by: INTERNAL MEDICINE

## 2025-01-01 PROCEDURE — 93325 DOPPLER ECHO COLOR FLOW MAPG: CPT | Mod: 26 | Performed by: STUDENT IN AN ORGANIZED HEALTH CARE EDUCATION/TRAINING PROGRAM

## 2025-01-01 PROCEDURE — B2111ZZ FLUOROSCOPY OF MULTIPLE CORONARY ARTERIES USING LOW OSMOLAR CONTRAST: ICD-10-PCS | Performed by: INTERNAL MEDICINE

## 2025-01-01 PROCEDURE — 999N000065 XR CHEST PORT 1 VIEW

## 2025-01-01 PROCEDURE — 84100 ASSAY OF PHOSPHORUS: CPT | Performed by: INTERNAL MEDICINE

## 2025-01-01 PROCEDURE — 80053 COMPREHEN METABOLIC PANEL: CPT

## 2025-01-01 PROCEDURE — 5A1223Z PERFORMANCE OF CARDIAC PACING, CONTINUOUS: ICD-10-PCS | Performed by: INTERNAL MEDICINE

## 2025-01-01 PROCEDURE — P9059 PLASMA, FRZ BETWEEN 8-24HOUR: HCPCS | Performed by: STUDENT IN AN ORGANIZED HEALTH CARE EDUCATION/TRAINING PROGRAM

## 2025-01-01 PROCEDURE — 999N000259 HC STATISTIC EXTUBATION

## 2025-01-01 PROCEDURE — 93010 ELECTROCARDIOGRAM REPORT: CPT | Performed by: EMERGENCY MEDICINE

## 2025-01-01 PROCEDURE — 258N000003 HC RX IP 258 OP 636: Performed by: EMERGENCY MEDICINE

## 2025-01-01 PROCEDURE — 85347 COAGULATION TIME ACTIVATED: CPT

## 2025-01-01 PROCEDURE — C1751 CATH, INF, PER/CENT/MIDLINE: HCPCS | Performed by: INTERNAL MEDICINE

## 2025-01-01 DEVICE — STENT CORONARY DES SYNERGY XD MR US 3.00X38MM H7493941838300: Type: IMPLANTABLE DEVICE | Status: FUNCTIONAL

## 2025-01-01 RX ORDER — DEXTROSE MONOHYDRATE 25 G/50ML
25-50 INJECTION, SOLUTION INTRAVENOUS
Status: DISCONTINUED | OUTPATIENT
Start: 2025-01-01 | End: 2025-01-01 | Stop reason: HOSPADM

## 2025-01-01 RX ORDER — FAMOTIDINE 20 MG/1
20 TABLET, FILM COATED ORAL
Status: DISCONTINUED | OUTPATIENT
Start: 2025-07-14 | End: 2025-01-01 | Stop reason: HOSPADM

## 2025-01-01 RX ORDER — POTASSIUM CHLORIDE 1.5 G/1.58G
20 POWDER, FOR SOLUTION ORAL ONCE
Status: COMPLETED | OUTPATIENT
Start: 2025-01-01 | End: 2025-01-01

## 2025-01-01 RX ORDER — NICOTINE POLACRILEX 4 MG
15-30 LOZENGE BUCCAL
Status: DISCONTINUED | OUTPATIENT
Start: 2025-01-01 | End: 2025-01-01

## 2025-01-01 RX ORDER — EPINEPHRINE IN 0.9 % SOD CHLOR 5 MG/250ML
.01-.3 PLASTIC BAG, INJECTION (ML) INTRAVENOUS CONTINUOUS
Status: ACTIVE | OUTPATIENT
Start: 2025-01-01 | End: 2025-01-01

## 2025-01-01 RX ORDER — SODIUM NITROPRUSSIDE 25 MG/ML
INJECTION INTRAVENOUS
Status: DISCONTINUED | OUTPATIENT
Start: 2025-01-01 | End: 2025-01-01 | Stop reason: HOSPADM

## 2025-01-01 RX ORDER — DEXTROSE MONOHYDRATE 100 MG/ML
INJECTION, SOLUTION INTRAVENOUS CONTINUOUS PRN
Status: DISCONTINUED | OUTPATIENT
Start: 2025-01-01 | End: 2025-01-01 | Stop reason: HOSPADM

## 2025-01-01 RX ORDER — EPINEPHRINE IN 0.9 % SOD CHLOR 5 MG/250ML
.01-.3 PLASTIC BAG, INJECTION (ML) INTRAVENOUS CONTINUOUS
Status: DISCONTINUED | OUTPATIENT
Start: 2025-01-01 | End: 2025-01-01

## 2025-01-01 RX ORDER — DEXTROSE MONOHYDRATE 25 G/50ML
25-50 INJECTION, SOLUTION INTRAVENOUS
Status: DISCONTINUED | OUTPATIENT
Start: 2025-01-01 | End: 2025-01-01

## 2025-01-01 RX ORDER — CHLORHEXIDINE GLUCONATE ORAL RINSE 1.2 MG/ML
15 SOLUTION DENTAL 2 TIMES DAILY
Status: DISCONTINUED | OUTPATIENT
Start: 2025-01-01 | End: 2025-01-01 | Stop reason: HOSPADM

## 2025-01-01 RX ORDER — NICOTINE POLACRILEX 4 MG
15-30 LOZENGE BUCCAL
Status: DISCONTINUED | OUTPATIENT
Start: 2025-01-01 | End: 2025-01-01 | Stop reason: HOSPADM

## 2025-01-01 RX ORDER — INDOMETHACIN 25 MG/1
CAPSULE ORAL
Status: DISCONTINUED
Start: 2025-01-01 | End: 2025-01-01 | Stop reason: HOSPADM

## 2025-01-01 RX ORDER — NALOXONE HYDROCHLORIDE 0.4 MG/ML
0.2 INJECTION, SOLUTION INTRAMUSCULAR; INTRAVENOUS; SUBCUTANEOUS
Status: DISCONTINUED | OUTPATIENT
Start: 2025-01-01 | End: 2025-01-01 | Stop reason: HOSPADM

## 2025-01-01 RX ORDER — NITROGLYCERIN 0.4 MG/1
0.4 TABLET SUBLINGUAL EVERY 5 MIN PRN
Status: DISCONTINUED | OUTPATIENT
Start: 2025-01-01 | End: 2025-01-01 | Stop reason: HOSPADM

## 2025-01-01 RX ORDER — INDOMETHACIN 25 MG/1
50 CAPSULE ORAL ONCE
Status: COMPLETED | OUTPATIENT
Start: 2025-01-01 | End: 2025-01-01

## 2025-01-01 RX ORDER — SODIUM CHLORIDE 9 MG/ML
INJECTION, SOLUTION INTRAVENOUS CONTINUOUS
OUTPATIENT
Start: 2025-01-01

## 2025-01-01 RX ORDER — ONDANSETRON 2 MG/ML
INJECTION INTRAMUSCULAR; INTRAVENOUS
Status: DISCONTINUED | OUTPATIENT
Start: 2025-01-01 | End: 2025-01-01 | Stop reason: HOSPADM

## 2025-01-01 RX ORDER — ACETAMINOPHEN 325 MG/10.15ML
650 LIQUID ORAL EVERY 4 HOURS PRN
Status: DISCONTINUED | OUTPATIENT
Start: 2025-01-01 | End: 2025-01-01

## 2025-01-01 RX ORDER — EPINEPHRINE 0.1 MG/ML
INJECTION INTRAVENOUS
Status: DISCONTINUED | OUTPATIENT
Start: 2025-01-01 | End: 2025-01-01 | Stop reason: HOSPADM

## 2025-01-01 RX ORDER — BISACODYL 5 MG
15 TABLET, DELAYED RELEASE (ENTERIC COATED) ORAL DAILY PRN
Status: DISCONTINUED | OUTPATIENT
Start: 2025-01-01 | End: 2025-01-01 | Stop reason: HOSPADM

## 2025-01-01 RX ORDER — NOREPINEPHRINE BITARTRATE 0.06 MG/ML
.01-.6 INJECTION, SOLUTION INTRAVENOUS CONTINUOUS
Status: DISCONTINUED | OUTPATIENT
Start: 2025-01-01 | End: 2025-01-01 | Stop reason: HOSPADM

## 2025-01-01 RX ORDER — NOREPINEPHRINE BITARTRATE 0.06 MG/ML
.01-.6 INJECTION, SOLUTION INTRAVENOUS CONTINUOUS
Status: DISCONTINUED | OUTPATIENT
Start: 2025-01-01 | End: 2025-01-01

## 2025-01-01 RX ORDER — ACETAMINOPHEN 325 MG/1
650 TABLET ORAL EVERY 4 HOURS PRN
OUTPATIENT
Start: 2025-01-01

## 2025-01-01 RX ORDER — ACETAMINOPHEN 650 MG/1
650 SUPPOSITORY RECTAL EVERY 4 HOURS PRN
Status: DISCONTINUED | OUTPATIENT
Start: 2025-01-01 | End: 2025-01-01 | Stop reason: HOSPADM

## 2025-01-01 RX ORDER — IOPAMIDOL 755 MG/ML
INJECTION, SOLUTION INTRAVASCULAR
Status: DISCONTINUED | OUTPATIENT
Start: 2025-01-01 | End: 2025-01-01 | Stop reason: HOSPADM

## 2025-01-01 RX ORDER — ASPIRIN 81 MG/1
TABLET, CHEWABLE ORAL
Status: DISCONTINUED
Start: 2025-01-01 | End: 2025-01-01 | Stop reason: HOSPADM

## 2025-01-01 RX ORDER — HYDROCORTISONE SODIUM SUCCINATE 100 MG/2ML
50 INJECTION INTRAMUSCULAR; INTRAVENOUS EVERY 6 HOURS
Status: DISCONTINUED | OUTPATIENT
Start: 2025-01-01 | End: 2025-01-01 | Stop reason: HOSPADM

## 2025-01-01 RX ORDER — NALOXONE HYDROCHLORIDE 0.4 MG/ML
0.4 INJECTION, SOLUTION INTRAMUSCULAR; INTRAVENOUS; SUBCUTANEOUS
Status: DISCONTINUED | OUTPATIENT
Start: 2025-01-01 | End: 2025-01-01 | Stop reason: HOSPADM

## 2025-01-01 RX ORDER — INDOMETHACIN 25 MG/1
CAPSULE ORAL
Status: COMPLETED
Start: 2025-01-01 | End: 2025-01-01

## 2025-01-01 RX ORDER — FENTANYL CITRATE 50 UG/ML
INJECTION, SOLUTION INTRAMUSCULAR; INTRAVENOUS
Status: DISCONTINUED | OUTPATIENT
Start: 2025-01-01 | End: 2025-01-01 | Stop reason: HOSPADM

## 2025-01-01 RX ORDER — CALCIUM GLUCONATE 20 MG/ML
1 INJECTION, SOLUTION INTRAVENOUS
Status: COMPLETED | OUTPATIENT
Start: 2025-01-01 | End: 2025-01-01

## 2025-01-01 RX ORDER — ONDANSETRON 2 MG/ML
4 INJECTION INTRAMUSCULAR; INTRAVENOUS EVERY 30 MIN PRN
Status: DISCONTINUED | OUTPATIENT
Start: 2025-01-01 | End: 2025-01-01 | Stop reason: HOSPADM

## 2025-01-01 RX ORDER — ATROPINE SULFATE 0.1 MG/ML
INJECTION INTRAVENOUS
Status: DISCONTINUED
Start: 2025-01-01 | End: 2025-01-01 | Stop reason: HOSPADM

## 2025-01-01 RX ORDER — TICAGRELOR 90 MG/1
90 TABLET, FILM COATED ORAL 2 TIMES DAILY
Status: DISCONTINUED | OUTPATIENT
Start: 2025-01-01 | End: 2025-01-01 | Stop reason: HOSPADM

## 2025-01-01 RX ORDER — ATROPINE SULFATE 0.1 MG/ML
INJECTION INTRAVENOUS
Status: COMPLETED
Start: 2025-01-01 | End: 2025-01-01

## 2025-01-01 RX ORDER — ACETAMINOPHEN 325 MG/1
650 TABLET ORAL EVERY 4 HOURS PRN
Status: DISCONTINUED | OUTPATIENT
Start: 2025-01-01 | End: 2025-01-01

## 2025-01-01 RX ORDER — BISACODYL 5 MG
10 TABLET, DELAYED RELEASE (ENTERIC COATED) ORAL DAILY PRN
Status: DISCONTINUED | OUTPATIENT
Start: 2025-01-01 | End: 2025-01-01 | Stop reason: HOSPADM

## 2025-01-01 RX ORDER — MIDAZOLAM HCL IN 0.9 % NACL/PF 1 MG/ML
1-8 PLASTIC BAG, INJECTION (ML) INTRAVENOUS CONTINUOUS
Status: DISCONTINUED | OUTPATIENT
Start: 2025-01-01 | End: 2025-01-01 | Stop reason: HOSPADM

## 2025-01-01 RX ORDER — ACETAMINOPHEN 325 MG/1
650 TABLET ORAL EVERY 4 HOURS PRN
Status: DISCONTINUED | OUTPATIENT
Start: 2025-01-01 | End: 2025-01-01 | Stop reason: HOSPADM

## 2025-01-01 RX ORDER — BISACODYL 5 MG
5 TABLET, DELAYED RELEASE (ENTERIC COATED) ORAL DAILY PRN
Status: DISCONTINUED | OUTPATIENT
Start: 2025-01-01 | End: 2025-01-01 | Stop reason: HOSPADM

## 2025-01-01 RX ORDER — MIDAZOLAM HCL IN 0.9 % NACL/PF 1 MG/ML
PLASTIC BAG, INJECTION (ML) INTRAVENOUS CONTINUOUS PRN
Status: COMPLETED | OUTPATIENT
Start: 2025-01-01 | End: 2025-01-01

## 2025-01-01 RX ORDER — HEPARIN SODIUM 1000 [USP'U]/ML
INJECTION, SOLUTION INTRAVENOUS; SUBCUTANEOUS
Status: DISCONTINUED | OUTPATIENT
Start: 2025-01-01 | End: 2025-01-01 | Stop reason: HOSPADM

## 2025-01-01 RX ORDER — ASPIRIN 325 MG
325 TABLET ORAL ONCE
Status: COMPLETED | OUTPATIENT
Start: 2025-01-01 | End: 2025-01-01

## 2025-01-01 RX ORDER — MAGNESIUM HYDROXIDE/ALUMINUM HYDROXICE/SIMETHICONE 120; 1200; 1200 MG/30ML; MG/30ML; MG/30ML
30 SUSPENSION ORAL EVERY 4 HOURS PRN
Status: DISCONTINUED | OUTPATIENT
Start: 2025-01-01 | End: 2025-01-01 | Stop reason: HOSPADM

## 2025-01-01 RX ORDER — INDOMETHACIN 25 MG/1
CAPSULE ORAL
Status: DISCONTINUED | OUTPATIENT
Start: 2025-01-01 | End: 2025-01-01 | Stop reason: HOSPADM

## 2025-01-01 RX ORDER — NOREPINEPHRINE BITARTRATE 0.06 MG/ML
INJECTION, SOLUTION INTRAVENOUS CONTINUOUS PRN
Status: COMPLETED | OUTPATIENT
Start: 2025-01-01 | End: 2025-01-01

## 2025-01-01 RX ORDER — FAMOTIDINE 20 MG/1
20 TABLET, FILM COATED ORAL DAILY
Status: DISCONTINUED | OUTPATIENT
Start: 2025-01-01 | End: 2025-01-01

## 2025-01-01 RX ORDER — MAGNESIUM SULFATE HEPTAHYDRATE 40 MG/ML
2 INJECTION, SOLUTION INTRAVENOUS ONCE
Status: COMPLETED | OUTPATIENT
Start: 2025-01-01 | End: 2025-01-01

## 2025-01-01 RX ORDER — DOBUTAMINE HYDROCHLORIDE 200 MG/100ML
2.5 INJECTION INTRAVENOUS CONTINUOUS
Status: DISCONTINUED | OUTPATIENT
Start: 2025-01-01 | End: 2025-01-01

## 2025-01-01 RX ORDER — LIDOCAINE 40 MG/G
CREAM TOPICAL
Status: DISCONTINUED | OUTPATIENT
Start: 2025-01-01 | End: 2025-01-01 | Stop reason: HOSPADM

## 2025-01-01 RX ORDER — LINEZOLID 2 MG/ML
600 INJECTION, SOLUTION INTRAVENOUS EVERY 12 HOURS
Status: DISCONTINUED | OUTPATIENT
Start: 2025-01-01 | End: 2025-01-01 | Stop reason: HOSPADM

## 2025-01-01 RX ORDER — ASPIRIN 81 MG/1
81 TABLET, CHEWABLE ORAL DAILY
Status: DISCONTINUED | OUTPATIENT
Start: 2025-01-01 | End: 2025-01-01 | Stop reason: HOSPADM

## 2025-01-01 RX ORDER — EPINEPHRINE 0.1 MG/ML
INJECTION INTRAVENOUS
Status: COMPLETED
Start: 2025-01-01 | End: 2025-01-01

## 2025-01-01 RX ORDER — CALCIUM GLUCONATE 20 MG/ML
1-2 INJECTION, SOLUTION INTRAVENOUS EVERY 6 HOURS PRN
Status: DISCONTINUED | OUTPATIENT
Start: 2025-01-01 | End: 2025-01-01 | Stop reason: HOSPADM

## 2025-01-01 RX ORDER — TICAGRELOR 90 MG/1
TABLET, FILM COATED ORAL
Status: DISCONTINUED | OUTPATIENT
Start: 2025-01-01 | End: 2025-01-01 | Stop reason: HOSPADM

## 2025-01-01 RX ORDER — HEPARIN SODIUM 5000 [USP'U]/.5ML
5000 INJECTION, SOLUTION INTRAVENOUS; SUBCUTANEOUS EVERY 8 HOURS
Status: DISCONTINUED | OUTPATIENT
Start: 2025-01-01 | End: 2025-01-01

## 2025-01-01 RX ADMIN — EPINEPHRINE 0.2 MCG/KG/MIN: 1 INJECTION INTRAMUSCULAR; INTRAVENOUS; SUBCUTANEOUS at 14:26

## 2025-01-01 RX ADMIN — DEXTROSE MONOHYDRATE 50 ML: 25 INJECTION, SOLUTION INTRAVENOUS at 10:30

## 2025-01-01 RX ADMIN — MIDAZOLAM IN SODIUM CHLORIDE 4 MG/HR: 1 INJECTION INTRAVENOUS at 18:11

## 2025-01-01 RX ADMIN — HYDROCORTISONE SODIUM SUCCINATE 50 MG: 100 INJECTION, POWDER, FOR SOLUTION INTRAMUSCULAR; INTRAVENOUS at 12:37

## 2025-01-01 RX ADMIN — Medication 25 MCG/HR: at 03:54

## 2025-01-01 RX ADMIN — ASPIRIN 81 MG CHEWABLE TABLET 81 MG: 81 TABLET CHEWABLE at 07:33

## 2025-01-01 RX ADMIN — LINEZOLID 600 MG: 600 INJECTION, SOLUTION INTRAVENOUS at 06:20

## 2025-01-01 RX ADMIN — SODIUM CHLORIDE 2000 ML: 0.9 INJECTION, SOLUTION INTRAVENOUS at 14:01

## 2025-01-01 RX ADMIN — CEFTOLOZANE AND TAZOBACTAM 1.5 G: 1; .5 INJECTION, POWDER, LYOPHILIZED, FOR SOLUTION INTRAVENOUS at 18:31

## 2025-01-01 RX ADMIN — CHLORHEXIDINE GLUCONATE 15 ML: 1.2 SOLUTION ORAL at 07:33

## 2025-01-01 RX ADMIN — LINEZOLID 600 MG: 600 INJECTION, SOLUTION INTRAVENOUS at 18:04

## 2025-01-01 RX ADMIN — SODIUM BICARBONATE 50 MEQ: 84 INJECTION INTRAVENOUS at 21:15

## 2025-01-01 RX ADMIN — SODIUM BICARBONATE 50 MEQ: 84 INJECTION INTRAVENOUS at 18:46

## 2025-01-01 RX ADMIN — MAGNESIUM SULFATE HEPTAHYDRATE 2 G: 2 INJECTION, SOLUTION INTRAVENOUS at 18:44

## 2025-01-01 RX ADMIN — EPINEPHRINE 20 MCG: 0.1 INJECTION INTRACARDIAC; INTRAVENOUS at 14:25

## 2025-01-01 RX ADMIN — ANGIOTENSIN II 20 NG/KG/MIN: 2.5 INJECTION INTRAVENOUS at 09:32

## 2025-01-01 RX ADMIN — HUMAN ALBUMIN MICROSPHERES AND PERFLUTREN 6 ML (DILUTED): 10; .22 INJECTION, SOLUTION INTRAVENOUS at 08:54

## 2025-01-01 RX ADMIN — VASOPRESSIN 4 UNITS/HR: 20 INJECTION, SOLUTION INTRAMUSCULAR; SUBCUTANEOUS at 09:43

## 2025-01-01 RX ADMIN — SODIUM BICARBONATE 50 MEQ: 84 INJECTION INTRAVENOUS at 18:40

## 2025-01-01 RX ADMIN — CALCIUM GLUCONATE 1 G: 20 INJECTION, SOLUTION INTRAVENOUS at 23:37

## 2025-01-01 RX ADMIN — VASOPRESSIN 4 UNITS/HR: 20 INJECTION, SOLUTION INTRAMUSCULAR; SUBCUTANEOUS at 22:27

## 2025-01-01 RX ADMIN — POTASSIUM CHLORIDE 20 MEQ: 1.5 POWDER, FOR SOLUTION ORAL at 06:32

## 2025-01-01 RX ADMIN — EPINEPHRINE 0.08 MCG/KG/MIN: 1 INJECTION INTRAMUSCULAR; INTRAVENOUS; SUBCUTANEOUS at 18:54

## 2025-01-01 RX ADMIN — ASPIRIN 325 MG ORAL TABLET 325 MG: 325 PILL ORAL at 14:27

## 2025-01-01 RX ADMIN — NOREPINEPHRINE BITARTRATE 0.15 MCG/KG/MIN: 0.06 INJECTION, SOLUTION INTRAVENOUS at 17:58

## 2025-01-01 RX ADMIN — INDOMETHACIN 50 MEQ: 25 CAPSULE ORAL at 03:08

## 2025-01-01 RX ADMIN — CALCIUM GLUCONATE 1 G: 20 INJECTION, SOLUTION INTRAVENOUS at 18:45

## 2025-01-01 RX ADMIN — INDOMETHACIN 50 MEQ: 25 CAPSULE ORAL at 12:37

## 2025-01-01 RX ADMIN — CEFTOLOZANE AND TAZOBACTAM 1.5 G: 1; .5 INJECTION, POWDER, LYOPHILIZED, FOR SOLUTION INTRAVENOUS at 10:14

## 2025-01-01 RX ADMIN — DOBUTAMINE HYDROCHLORIDE 2.5 MCG/KG/MIN: 200 INJECTION INTRAVENOUS at 18:01

## 2025-01-01 RX ADMIN — Medication 0.1 MCG/KG/MIN: at 18:00

## 2025-01-01 RX ADMIN — VASOPRESSIN 2 UNITS/HR: 20 INJECTION, SOLUTION INTRAMUSCULAR; SUBCUTANEOUS at 17:59

## 2025-01-01 RX ADMIN — EPINEPHRINE 20 MCG: 0.1 INJECTION INTRACARDIAC; INTRAVENOUS at 14:16

## 2025-01-01 RX ADMIN — CALCIUM GLUCONATE 1 G: 20 INJECTION, SOLUTION INTRAVENOUS at 06:20

## 2025-01-01 RX ADMIN — SODIUM CHLORIDE, SODIUM LACTATE, POTASSIUM CHLORIDE, AND CALCIUM CHLORIDE 500 ML: .6; .31; .03; .02 INJECTION, SOLUTION INTRAVENOUS at 02:32

## 2025-01-01 RX ADMIN — MIDAZOLAM IN SODIUM CHLORIDE 2 MG/HR: 1 INJECTION INTRAVENOUS at 14:15

## 2025-01-01 RX ADMIN — ATROPINE SULFATE 0.5 MG: 0.1 INJECTION, SOLUTION ENDOTRACHEAL; INTRAMUSCULAR; INTRAVENOUS; SUBCUTANEOUS at 14:19

## 2025-01-01 RX ADMIN — EPINEPHRINE 20 MCG: 0.1 INJECTION INTRACARDIAC; INTRAVENOUS at 14:20

## 2025-01-01 RX ADMIN — POTASSIUM CHLORIDE 20 MEQ: 1.5 POWDER, FOR SOLUTION ORAL at 23:37

## 2025-01-01 RX ADMIN — INSULIN HUMAN 16 UNITS/HR: 1 INJECTION, SOLUTION INTRAVENOUS at 22:25

## 2025-01-01 RX ADMIN — FAMOTIDINE 20 MG: 20 TABLET, FILM COATED ORAL at 07:33

## 2025-01-01 RX ADMIN — TICAGRELOR 90 MG: 90 TABLET ORAL at 06:19

## 2025-01-01 RX ADMIN — CEFTOLOZANE AND TAZOBACTAM 1.5 G: 1; .5 INJECTION, POWDER, LYOPHILIZED, FOR SOLUTION INTRAVENOUS at 01:04

## 2025-01-01 RX ADMIN — INSULIN HUMAN 3 UNITS/HR: 1 INJECTION, SOLUTION INTRAVENOUS at 18:47

## 2025-01-01 RX ADMIN — SODIUM BICARBONATE 50 MEQ: 84 INJECTION INTRAVENOUS at 03:08

## 2025-01-01 RX ADMIN — NOREPINEPHRINE BITARTRATE 0.32 MCG/KG/MIN: 0.06 INJECTION, SOLUTION INTRAVENOUS at 06:19

## 2025-01-01 RX ADMIN — SODIUM BICARBONATE 50 MEQ: 84 INJECTION INTRAVENOUS at 12:37

## 2025-01-01 RX ADMIN — AMIODARONE HYDROCHLORIDE 1 MG/MIN: 50 INJECTION, SOLUTION INTRAVENOUS at 02:33

## 2025-01-01 RX ADMIN — DEXTROSE MONOHYDRATE 50 ML: 25 INJECTION, SOLUTION INTRAVENOUS at 14:06

## 2025-01-01 ASSESSMENT — ACTIVITIES OF DAILY LIVING (ADL)
ADLS_ACUITY_SCORE: 66
ADLS_ACUITY_SCORE: 67
ADLS_ACUITY_SCORE: 67
ADLS_ACUITY_SCORE: 66
ADLS_ACUITY_SCORE: 67
ADLS_ACUITY_SCORE: 66
ADLS_ACUITY_SCORE: 66
ADLS_ACUITY_SCORE: 63
ADLS_ACUITY_SCORE: 67
ADLS_ACUITY_SCORE: 63
ADLS_ACUITY_SCORE: 66
ADLS_ACUITY_SCORE: 63
ADLS_ACUITY_SCORE: 67
ADLS_ACUITY_SCORE: 66
ADLS_ACUITY_SCORE: 66
ADLS_ACUITY_SCORE: 63
ADLS_ACUITY_SCORE: 66
ADLS_ACUITY_SCORE: 67
ADLS_ACUITY_SCORE: 63
ADLS_ACUITY_SCORE: 67
ADLS_ACUITY_SCORE: 63
ADLS_ACUITY_SCORE: 67
ADLS_ACUITY_SCORE: 63
ADLS_ACUITY_SCORE: 67
ADLS_ACUITY_SCORE: 67
ADLS_ACUITY_SCORE: 66

## 2025-01-01 ASSESSMENT — ENCOUNTER SYMPTOMS: DYSRHYTHMIAS: 1

## 2025-01-03 ENCOUNTER — APPOINTMENT (OUTPATIENT)
Dept: GENERAL RADIOLOGY | Facility: CLINIC | Age: 75
DRG: 152 | End: 2025-01-03
Attending: EMERGENCY MEDICINE
Payer: MEDICARE

## 2025-01-03 ENCOUNTER — HOSPITAL ENCOUNTER (INPATIENT)
Facility: CLINIC | Age: 75
LOS: 2 days | Discharge: HOME | DRG: 152 | End: 2025-01-05
Attending: EMERGENCY MEDICINE | Admitting: HOSPITALIST
Payer: MEDICARE

## 2025-01-03 DIAGNOSIS — D84.9 IMMUNOSUPPRESSED STATUS: ICD-10-CM

## 2025-01-03 DIAGNOSIS — R50.9 FEVER IN ADULT: ICD-10-CM

## 2025-01-03 DIAGNOSIS — I95.9 TRANSIENT HYPOTENSION: ICD-10-CM

## 2025-01-03 DIAGNOSIS — I48.0 PAROXYSMAL ATRIAL FIBRILLATION (H): ICD-10-CM

## 2025-01-03 DIAGNOSIS — R79.89 ELEVATED TROPONIN: ICD-10-CM

## 2025-01-03 LAB
ALBUMIN SERPL BCG-MCNC: 3.5 G/DL (ref 3.5–5.2)
ALBUMIN UR-MCNC: 10 MG/DL
ALP SERPL-CCNC: 108 U/L (ref 40–150)
ALT SERPL W P-5'-P-CCNC: 26 U/L (ref 0–50)
AMMONIA PLAS-SCNC: 20 UMOL/L (ref 11–51)
ANION GAP SERPL CALCULATED.3IONS-SCNC: 18 MMOL/L (ref 7–15)
APPEARANCE UR: CLEAR
AST SERPL W P-5'-P-CCNC: 22 U/L (ref 0–45)
BACTERIA #/AREA URNS HPF: ABNORMAL /HPF
BASOPHILS # BLD MANUAL: 0.3 10E3/UL (ref 0–0.2)
BASOPHILS NFR BLD MANUAL: 3 %
BILIRUB DIRECT SERPL-MCNC: <0.2 MG/DL (ref 0–0.3)
BILIRUB SERPL-MCNC: 0.4 MG/DL
BILIRUB UR QL STRIP: NEGATIVE
BUN SERPL-MCNC: 45.5 MG/DL (ref 8–23)
CALCIUM SERPL-MCNC: 9.2 MG/DL (ref 8.8–10.4)
CHLORIDE SERPL-SCNC: 103 MMOL/L (ref 98–107)
COLOR UR AUTO: ABNORMAL
CREAT SERPL-MCNC: 1.77 MG/DL (ref 0.51–0.95)
EGFRCR SERPLBLD CKD-EPI 2021: 29 ML/MIN/1.73M2
EOSINOPHIL # BLD MANUAL: 0 10E3/UL (ref 0–0.7)
EOSINOPHIL NFR BLD MANUAL: 0 %
ERYTHROCYTE [DISTWIDTH] IN BLOOD BY AUTOMATED COUNT: 15.8 % (ref 10–15)
FLUAV RNA SPEC QL NAA+PROBE: NEGATIVE
FLUBV RNA RESP QL NAA+PROBE: NEGATIVE
GLUCOSE SERPL-MCNC: 112 MG/DL (ref 70–99)
GLUCOSE UR STRIP-MCNC: NEGATIVE MG/DL
HCO3 BLDV-SCNC: 20 MMOL/L (ref 21–28)
HCO3 SERPL-SCNC: 18 MMOL/L (ref 22–29)
HCT VFR BLD AUTO: 32.2 % (ref 35–47)
HGB BLD-MCNC: 10.2 G/DL (ref 11.7–15.7)
HGB UR QL STRIP: NEGATIVE
INR PPP: 1.23 (ref 0.85–1.15)
KETONES UR STRIP-MCNC: NEGATIVE MG/DL
LACTATE BLD-SCNC: 1.3 MMOL/L
LEUKOCYTE ESTERASE UR QL STRIP: ABNORMAL
LIPASE SERPL-CCNC: 29 U/L (ref 13–60)
LYMPHOCYTES # BLD MANUAL: 1 10E3/UL (ref 0.8–5.3)
LYMPHOCYTES NFR BLD MANUAL: 11 %
MCH RBC QN AUTO: 28 PG (ref 26.5–33)
MCHC RBC AUTO-ENTMCNC: 31.7 G/DL (ref 31.5–36.5)
MCV RBC AUTO: 89 FL (ref 78–100)
MONOCYTES # BLD MANUAL: 1 10E3/UL (ref 0–1.3)
MONOCYTES NFR BLD MANUAL: 11 %
MUCOUS THREADS #/AREA URNS LPF: PRESENT /LPF
NEUTROPHILS # BLD MANUAL: 6.9 10E3/UL (ref 1.6–8.3)
NEUTROPHILS NFR BLD MANUAL: 75 %
NITRATE UR QL: NEGATIVE
PCO2 BLDV: 28 MM HG (ref 40–50)
PH BLDV: 7.47 [PH] (ref 7.32–7.43)
PH UR STRIP: 5.5 [PH] (ref 5–7)
PLAT MORPH BLD: ABNORMAL
PLATELET # BLD AUTO: 418 10E3/UL (ref 150–450)
PO2 BLDV: 42 MM HG (ref 25–47)
POTASSIUM SERPL-SCNC: 3.5 MMOL/L (ref 3.4–5.3)
PROCALCITONIN SERPL IA-MCNC: 0.15 NG/ML
PROT SERPL-MCNC: 6.3 G/DL (ref 6.4–8.3)
RBC # BLD AUTO: 3.64 10E6/UL (ref 3.8–5.2)
RBC MORPH BLD: ABNORMAL
RBC URINE: 1 /HPF
RSV RNA SPEC NAA+PROBE: NEGATIVE
SAO2 % BLDV: 82 % (ref 70–75)
SARS-COV-2 RNA RESP QL NAA+PROBE: NEGATIVE
SODIUM SERPL-SCNC: 139 MMOL/L (ref 135–145)
SP GR UR STRIP: 1.01 (ref 1–1.03)
SQUAMOUS EPITHELIAL: 2 /HPF
TROPONIN T SERPL HS-MCNC: 100 NG/L
TROPONIN T SERPL HS-MCNC: 47 NG/L
UROBILINOGEN UR STRIP-MCNC: NORMAL MG/DL
WBC # BLD AUTO: 9.2 10E3/UL (ref 4–11)
WBC URINE: <1 /HPF

## 2025-01-03 PROCEDURE — 87040 BLOOD CULTURE FOR BACTERIA: CPT | Performed by: EMERGENCY MEDICINE

## 2025-01-03 PROCEDURE — 120N000001 HC R&B MED SURG/OB

## 2025-01-03 PROCEDURE — 83605 ASSAY OF LACTIC ACID: CPT

## 2025-01-03 PROCEDURE — 81001 URINALYSIS AUTO W/SCOPE: CPT | Performed by: EMERGENCY MEDICINE

## 2025-01-03 PROCEDURE — 71046 X-RAY EXAM CHEST 2 VIEWS: CPT

## 2025-01-03 PROCEDURE — 250N000011 HC RX IP 250 OP 636: Performed by: EMERGENCY MEDICINE

## 2025-01-03 PROCEDURE — 85027 COMPLETE CBC AUTOMATED: CPT | Performed by: EMERGENCY MEDICINE

## 2025-01-03 PROCEDURE — 96374 THER/PROPH/DIAG INJ IV PUSH: CPT | Mod: 59

## 2025-01-03 PROCEDURE — G0378 HOSPITAL OBSERVATION PER HR: HCPCS

## 2025-01-03 PROCEDURE — 80048 BASIC METABOLIC PNL TOTAL CA: CPT | Performed by: EMERGENCY MEDICINE

## 2025-01-03 PROCEDURE — 82435 ASSAY OF BLOOD CHLORIDE: CPT | Performed by: EMERGENCY MEDICINE

## 2025-01-03 PROCEDURE — 99285 EMERGENCY DEPT VISIT HI MDM: CPT | Mod: 25

## 2025-01-03 PROCEDURE — 84155 ASSAY OF PROTEIN SERUM: CPT | Performed by: EMERGENCY MEDICINE

## 2025-01-03 PROCEDURE — 93005 ELECTROCARDIOGRAM TRACING: CPT

## 2025-01-03 PROCEDURE — 99222 1ST HOSP IP/OBS MODERATE 55: CPT | Mod: AI | Performed by: PHYSICIAN ASSISTANT

## 2025-01-03 PROCEDURE — 85007 BL SMEAR W/DIFF WBC COUNT: CPT | Performed by: EMERGENCY MEDICINE

## 2025-01-03 PROCEDURE — 36415 COLL VENOUS BLD VENIPUNCTURE: CPT | Performed by: EMERGENCY MEDICINE

## 2025-01-03 PROCEDURE — 87637 SARSCOV2&INF A&B&RSV AMP PRB: CPT | Performed by: EMERGENCY MEDICINE

## 2025-01-03 PROCEDURE — 83690 ASSAY OF LIPASE: CPT | Performed by: EMERGENCY MEDICINE

## 2025-01-03 PROCEDURE — 82140 ASSAY OF AMMONIA: CPT | Performed by: EMERGENCY MEDICINE

## 2025-01-03 PROCEDURE — 85610 PROTHROMBIN TIME: CPT | Performed by: EMERGENCY MEDICINE

## 2025-01-03 PROCEDURE — 250N000011 HC RX IP 250 OP 636: Performed by: PHYSICIAN ASSISTANT

## 2025-01-03 PROCEDURE — 84484 ASSAY OF TROPONIN QUANT: CPT | Performed by: EMERGENCY MEDICINE

## 2025-01-03 PROCEDURE — 84145 PROCALCITONIN (PCT): CPT | Performed by: EMERGENCY MEDICINE

## 2025-01-03 PROCEDURE — 96361 HYDRATE IV INFUSION ADD-ON: CPT | Mod: 59

## 2025-01-03 PROCEDURE — 258N000003 HC RX IP 258 OP 636: Performed by: EMERGENCY MEDICINE

## 2025-01-03 PROCEDURE — 82040 ASSAY OF SERUM ALBUMIN: CPT | Performed by: EMERGENCY MEDICINE

## 2025-01-03 PROCEDURE — 82803 BLOOD GASES ANY COMBINATION: CPT

## 2025-01-03 RX ORDER — SIROLIMUS 0.5 MG/1
1 TABLET, FILM COATED ORAL DAILY
Status: DISCONTINUED | OUTPATIENT
Start: 2025-01-04 | End: 2025-01-05 | Stop reason: HOSPADM

## 2025-01-03 RX ORDER — URSODIOL 250 MG/1
250 TABLET, FILM COATED ORAL 2 TIMES DAILY
Status: DISCONTINUED | OUTPATIENT
Start: 2025-01-03 | End: 2025-01-05 | Stop reason: HOSPADM

## 2025-01-03 RX ORDER — ASPIRIN 81 MG/1
81 TABLET ORAL DAILY
Status: DISCONTINUED | OUTPATIENT
Start: 2025-01-03 | End: 2025-01-05 | Stop reason: HOSPADM

## 2025-01-03 RX ORDER — GABAPENTIN 250 MG/5ML
300 SOLUTION ORAL AT BEDTIME
Status: DISCONTINUED | OUTPATIENT
Start: 2025-01-03 | End: 2025-01-05 | Stop reason: HOSPADM

## 2025-01-03 RX ORDER — EZETIMIBE 10 MG/1
10 TABLET ORAL DAILY
Status: CANCELLED | OUTPATIENT
Start: 2025-01-04

## 2025-01-03 RX ORDER — CEFEPIME HYDROCHLORIDE 2 G/1
2 INJECTION, POWDER, FOR SOLUTION INTRAVENOUS ONCE
Status: DISCONTINUED | OUTPATIENT
Start: 2025-01-03 | End: 2025-01-03

## 2025-01-03 RX ORDER — FOLIC ACID 1 MG/1
1 TABLET ORAL DAILY
Status: DISCONTINUED | OUTPATIENT
Start: 2025-01-04 | End: 2025-01-05 | Stop reason: HOSPADM

## 2025-01-03 RX ORDER — AMOXICILLIN 250 MG
1 CAPSULE ORAL 2 TIMES DAILY PRN
Status: DISCONTINUED | OUTPATIENT
Start: 2025-01-03 | End: 2025-01-05 | Stop reason: HOSPADM

## 2025-01-03 RX ORDER — ONDANSETRON 4 MG/1
4 TABLET, ORALLY DISINTEGRATING ORAL EVERY 6 HOURS PRN
Status: DISCONTINUED | OUTPATIENT
Start: 2025-01-03 | End: 2025-01-05 | Stop reason: HOSPADM

## 2025-01-03 RX ORDER — METOPROLOL SUCCINATE 25 MG/1
25 TABLET, EXTENDED RELEASE ORAL 2 TIMES DAILY
Status: DISCONTINUED | OUTPATIENT
Start: 2025-01-03 | End: 2025-01-05 | Stop reason: HOSPADM

## 2025-01-03 RX ORDER — PROCHLORPERAZINE MALEATE 5 MG/1
5 TABLET ORAL EVERY 6 HOURS PRN
Status: DISCONTINUED | OUTPATIENT
Start: 2025-01-03 | End: 2025-01-05 | Stop reason: HOSPADM

## 2025-01-03 RX ORDER — LIDOCAINE 40 MG/G
CREAM TOPICAL
Status: DISCONTINUED | OUTPATIENT
Start: 2025-01-03 | End: 2025-01-05 | Stop reason: HOSPADM

## 2025-01-03 RX ORDER — ACETAMINOPHEN 650 MG/1
650 SUPPOSITORY RECTAL EVERY 4 HOURS PRN
Status: DISCONTINUED | OUTPATIENT
Start: 2025-01-03 | End: 2025-01-05 | Stop reason: HOSPADM

## 2025-01-03 RX ORDER — ONDANSETRON 2 MG/ML
4 INJECTION INTRAMUSCULAR; INTRAVENOUS EVERY 6 HOURS PRN
Status: DISCONTINUED | OUTPATIENT
Start: 2025-01-03 | End: 2025-01-05 | Stop reason: HOSPADM

## 2025-01-03 RX ORDER — HEPARIN SODIUM 10000 [USP'U]/100ML
0-5000 INJECTION, SOLUTION INTRAVENOUS CONTINUOUS
Status: DISCONTINUED | OUTPATIENT
Start: 2025-01-03 | End: 2025-01-04

## 2025-01-03 RX ORDER — NYSTATIN 100000 U/G
CREAM TOPICAL 2 TIMES DAILY
Status: DISCONTINUED | OUTPATIENT
Start: 2025-01-03 | End: 2025-01-05 | Stop reason: HOSPADM

## 2025-01-03 RX ORDER — HYDRALAZINE HYDROCHLORIDE 50 MG/1
50 TABLET, FILM COATED ORAL 3 TIMES DAILY
Status: DISCONTINUED | OUTPATIENT
Start: 2025-01-03 | End: 2025-01-05 | Stop reason: HOSPADM

## 2025-01-03 RX ORDER — AMOXICILLIN 250 MG
2 CAPSULE ORAL 2 TIMES DAILY PRN
Status: DISCONTINUED | OUTPATIENT
Start: 2025-01-03 | End: 2025-01-05 | Stop reason: HOSPADM

## 2025-01-03 RX ORDER — PREDNISONE 10 MG/1
10 TABLET ORAL DAILY
Status: DISCONTINUED | OUTPATIENT
Start: 2025-01-04 | End: 2025-01-05 | Stop reason: HOSPADM

## 2025-01-03 RX ORDER — ACETAMINOPHEN 325 MG/1
650 TABLET ORAL EVERY 4 HOURS PRN
Status: DISCONTINUED | OUTPATIENT
Start: 2025-01-03 | End: 2025-01-05 | Stop reason: HOSPADM

## 2025-01-03 RX ADMIN — HEPARIN SODIUM 950 UNITS/HR: 10000 INJECTION, SOLUTION INTRAVENOUS at 23:37

## 2025-01-03 RX ADMIN — SODIUM CHLORIDE 1848 ML: 9 INJECTION, SOLUTION INTRAVENOUS at 17:44

## 2025-01-03 RX ADMIN — CEFEPIME HYDROCHLORIDE 2 G: 2 INJECTION, POWDER, FOR SOLUTION INTRAVENOUS at 20:15

## 2025-01-03 ASSESSMENT — ACTIVITIES OF DAILY LIVING (ADL)
ADLS_ACUITY_SCORE: 58

## 2025-01-03 ASSESSMENT — COLUMBIA-SUICIDE SEVERITY RATING SCALE - C-SSRS
6. HAVE YOU EVER DONE ANYTHING, STARTED TO DO ANYTHING, OR PREPARED TO DO ANYTHING TO END YOUR LIFE?: NO
2. HAVE YOU ACTUALLY HAD ANY THOUGHTS OF KILLING YOURSELF IN THE PAST MONTH?: NO
1. IN THE PAST MONTH, HAVE YOU WISHED YOU WERE DEAD OR WISHED YOU COULD GO TO SLEEP AND NOT WAKE UP?: NO

## 2025-01-03 NOTE — ED NOTES
pt comes in from home with fever ov 101 post tylenol, general malase and UTI symptoms. pt has a hx of UTI's. pt able to stand and pivot from EMS cot to Wheelchair without difficulty. pt daughter. has arrived. VSS for EMS.

## 2025-01-03 NOTE — ED TRIAGE NOTES
PT COMES IN with elevated temp. Of 101. Pt took tylenol earlier today. Pt is immunosuppressed(following a liver transplant in 2002) and was told that when her temp gets over 101 she needs to come to the ER because she can become septic. Pt is very anxious and C/O back pain.

## 2025-01-03 NOTE — ED PROVIDER NOTES
Emergency Department Note      History of Present Illness     Chief Complaint   Hypotension      HPI   Luz Thompson is a 74 year old female with history of CKD, DVT, anemia of chronic disease and type 2 diabetes who presents to the ED via EMS for evaluation of hypotension. Patient reports that she had low grade fever for about week. Today she come to ED for elevated fever of 101, which concerned her more since she is immunosuppressed. She endorse shortness of breath with chest pain, dizziness, feeling like passing out, and rhinorrhea. She went to her primary care doctor yesterday with same concerns and she was waiting to get lab work today. She took Tylenol around 12 PM today. Last night patient had loose stools. Patient reports taking Eliquis which she did not take today. In the past patient was asymptomatic when she had UTI. Until today she had good oral intake. Denies productive cough, abdominal pain, vomiting, diarrhea, urinary symptoms, catheter at home, or new skin rash.      Independent Historian   None    Review of External Notes   I did review patient's recent hospital discharge 12/15 for pyelonephritis.    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  Type II diabetes  Glaucoma  Sjogren's syndrome  Biliary cirrhosis    Medications   Actigall  Imitrex  Sirolimus  Sennosides  Deltasone  Lovaza  Toprol  Antivert  Cozaar  Tradjenta  Synthroid  Lasix  Flovite  Fergon  Zetia  Repatha sureclick  Estrace  Eliquis     Surgical History   Transplant liver recipient living   Thyroidectomy  PICC  "insertion  Right knee surgery  Endoscopic retrograde cholangiopancreatogram  Endobronchial US  Colonoscopy  Cholecystectomy  Cataract IOL  Appendectomy      Physical Exam     Patient Vitals for the past 24 hrs:   BP Temp Temp src Pulse Resp SpO2 Height Weight   01/03/25 1847 -- -- -- -- 14 -- -- --   01/03/25 1845 134/67 -- -- 82 -- 94 % -- --   01/03/25 1737 105/56 -- -- 112 -- 96 % -- --   01/03/25 1648 (!) 78/55 -- -- -- -- -- 1.702 m (5' 7\") 79.4 kg (175 lb)   01/03/25 1645 (!) 65/36 99  F (37.2  C) Oral 64 20 97 % -- --     Physical Exam  General: Alert, no acute distress  Neuro:  No focal deficits  HEENT:  Moist mucous membranes.  Conjunctiva normal.   CV:  RRR, no m/r/g, skin warm and well perfused  Pulm:  CTAB, no wheezes/ronchi/rales.  No acute distress, breathing comfortably  GI:  Soft, nontender, nondistended.  No rebound or guarding.    MSK:  Moving all extremities.  No focal areas of edema, erythema  Skin:  WWP, no rashes, no lower extremity edema, skin color normal, no diaphoresis  Psych:  Well-appearing, normal affect, regular speech    Diagnostics     Lab Results   Labs Ordered and Resulted from Time of ED Arrival to Time of ED Departure   BASIC METABOLIC PANEL - Abnormal       Result Value    Sodium 139      Potassium 3.5      Chloride 103      Carbon Dioxide (CO2) 18 (*)     Anion Gap 18 (*)     Urea Nitrogen 45.5 (*)     Creatinine 1.77 (*)     GFR Estimate 29 (*)     Calcium 9.2      Glucose 112 (*)    HEPATIC FUNCTION PANEL - Abnormal    Protein Total 6.3 (*)     Albumin 3.5      Bilirubin Total 0.4      Alkaline Phosphatase 108      AST 22      ALT 26      Bilirubin Direct <0.20     INR - Abnormal    INR 1.23 (*)    TROPONIN T, HIGH SENSITIVITY - Abnormal    Troponin T, High Sensitivity 47 (*)    ROUTINE UA WITH MICROSCOPIC REFLEX TO CULTURE - Abnormal    Color Urine Light Yellow      Appearance Urine Clear      Glucose Urine Negative      Bilirubin Urine Negative      Ketones Urine Negative   "    Specific Gravity Urine 1.012      Blood Urine Negative      pH Urine 5.5      Protein Albumin Urine 10 (*)     Urobilinogen Urine Normal      Nitrite Urine Negative      Leukocyte Esterase Urine Trace (*)     Bacteria Urine Few (*)     Mucus Urine Present (*)     RBC Urine 1      WBC Urine <1      Squamous Epithelials Urine 2 (*)    CBC WITH PLATELETS AND DIFFERENTIAL - Abnormal    WBC Count 9.2      RBC Count 3.64 (*)     Hemoglobin 10.2 (*)     Hematocrit 32.2 (*)     MCV 89      MCH 28.0      MCHC 31.7      RDW 15.8 (*)     Platelet Count 418     ISTAT GASES LACTATE VENOUS POCT - Abnormal    Lactic Acid POCT 1.3      Bicarbonate Venous POCT 20 (*)     O2 Sat, Venous POCT 82 (*)     pCO2 Venous POCT 28 (*)     pH Venous POCT 7.47 (*)     pO2 Venous POCT 42     MANUAL DIFFERENTIAL - Abnormal    % Neutrophils 75      % Lymphocytes 11      % Monocytes 11      % Eosinophils 0      % Basophils 3      Absolute Neutrophils 6.9      Absolute Lymphocytes 1.0      Absolute Monocytes 1.0      Absolute Eosinophils 0.0      Absolute Basophils 0.3 (*)     RBC Morphology Confirmed RBC Indices      Platelet Assessment        Value: Automated Count Confirmed. Platelet morphology is normal.   LIPASE - Normal    Lipase 29     AMMONIA - Normal    Ammonia 20     PROCALCITONIN - Normal    Procalcitonin 0.15     INFLUENZA A/B, RSV AND SARS-COV2 PCR - Normal    Influenza A PCR Negative      Influenza B PCR Negative      RSV PCR Negative      SARS CoV2 PCR Negative     TROPONIN T, HIGH SENSITIVITY   BLOOD CULTURE   BLOOD CULTURE       Imaging   Chest XR,  PA & LAT   Final Result   IMPRESSION: Cardiac silhouette is within normal limits. No focal airspace consolidation. Trace left pleural effusion. No discernible pneumothorax. Left chest loop recorder.          EKG   None.   Independent Interpretation   CXR: No pneumothorax, infiltrate, or pleural effusion.    ED Course      Medications Administered   Medications   sodium chloride 0.9%  BOLUS 1,848 mL (0 mLs Intravenous Stopped 1/3/25 2019)       Procedures   Procedures     Discussion of Management   Admitting Hospitalist, Jya MCKEON for Dr. Montero    ED Course   ED Course as of 01/03/25 2034 Fri Jan 03, 2025 1707 I obtained history and examined the patient as noted above.        Additional Documentation  None    Medical Decision Making / Diagnosis     CMS Diagnoses: None    MIPS       None    MDM   Luz Thompson is a 74 year old female with history of liver transplant on immunosuppression presenting to the emergency department for evaluation of several days of low-grade fever with spiking fever to 101F today.  Patient arrives hypotensive with low-grade fever.  Patient does endorse rhinorrhea, shortness of breath and chest pain.  Denies abdominal pain and has benign abdominal exam.  Limited viral testing is negative for influenza/RSV/COVID-19.  Given immunosuppressed status and concern for sepsis, blood work including blood cultures obtained.  Patient did respond to fluid resuscitation with 30 mL/kg NS bolus.  Lab studies notable for stable CKD, normal lactic acid, normal WBC, normal UA and normal procalcitonin.  High sensitivity troponin is detectable and delta troponin is ordered and pending, suspect due to demand ischemia hypovolemia rather than ACS.  Chest x-ray shows no acute findings.  Unclear cause for patient's fever or hypotension. Viral etiology also considered. No signs concerning for CNS infection or intraabdominal catastrophe.  Initially plan for broad-spectrum antibiotics for sepsis of unknown source but after discussion with hospitalist service we will hold off on antibiotics at this time.  Patient will be admitted under hospital observation status for close monitoring to determine if infectious bacterial etiology declares itself.     Disposition   The patient was admitted to the hospital.     Diagnosis     ICD-10-CM    1. Fever in adult  R50.9       2. Immunosuppressed  status (H)  D84.9       3. Transient hypotension  I95.9            Discharge Medications   New Prescriptions    No medications on file         Scribe Disclosure:  I, Sandra eNwby, am serving as a scribe at 5:29 PM on 1/3/2025 to document services personally performed by Flako Campos MD based on my observations and the provider's statements to me.        Flako Campos MD  01/03/25 2036       Flako Campos MD  01/03/25 0890

## 2025-01-04 ENCOUNTER — APPOINTMENT (OUTPATIENT)
Dept: CARDIOLOGY | Facility: CLINIC | Age: 75
DRG: 152 | End: 2025-01-04
Attending: PHYSICIAN ASSISTANT
Payer: MEDICARE

## 2025-01-04 LAB
ANION GAP SERPL CALCULATED.3IONS-SCNC: 13 MMOL/L (ref 7–15)
APTT PPP: 31 SECONDS (ref 22–38)
APTT PPP: 92 SECONDS (ref 22–38)
BUN SERPL-MCNC: 39.3 MG/DL (ref 8–23)
C PNEUM DNA SPEC QL NAA+PROBE: NOT DETECTED
CALCIUM SERPL-MCNC: 8.5 MG/DL (ref 8.8–10.4)
CHLORIDE SERPL-SCNC: 108 MMOL/L (ref 98–107)
CREAT SERPL-MCNC: 1.68 MG/DL (ref 0.51–0.95)
EGFRCR SERPLBLD CKD-EPI 2021: 31 ML/MIN/1.73M2
ERYTHROCYTE [DISTWIDTH] IN BLOOD BY AUTOMATED COUNT: 15.9 % (ref 10–15)
FLUAV H1 2009 PAND RNA SPEC QL NAA+PROBE: NOT DETECTED
FLUAV H1 RNA SPEC QL NAA+PROBE: NOT DETECTED
FLUAV H3 RNA SPEC QL NAA+PROBE: NOT DETECTED
FLUAV RNA SPEC QL NAA+PROBE: NOT DETECTED
FLUBV RNA SPEC QL NAA+PROBE: NOT DETECTED
GLUCOSE BLDC GLUCOMTR-MCNC: 185 MG/DL (ref 70–99)
GLUCOSE BLDC GLUCOMTR-MCNC: 194 MG/DL (ref 70–99)
GLUCOSE BLDC GLUCOMTR-MCNC: 248 MG/DL (ref 70–99)
GLUCOSE SERPL-MCNC: 183 MG/DL (ref 70–99)
HADV DNA SPEC QL NAA+PROBE: NOT DETECTED
HCO3 SERPL-SCNC: 17 MMOL/L (ref 22–29)
HCOV PNL SPEC NAA+PROBE: NOT DETECTED
HCT VFR BLD AUTO: 28.7 % (ref 35–47)
HGB BLD-MCNC: 9 G/DL (ref 11.7–15.7)
HMPV RNA SPEC QL NAA+PROBE: NOT DETECTED
HPIV1 RNA SPEC QL NAA+PROBE: NOT DETECTED
HPIV2 RNA SPEC QL NAA+PROBE: NOT DETECTED
HPIV3 RNA SPEC QL NAA+PROBE: NOT DETECTED
HPIV4 RNA SPEC QL NAA+PROBE: NOT DETECTED
LVEF ECHO: NORMAL
M PNEUMO DNA SPEC QL NAA+PROBE: NOT DETECTED
MCH RBC QN AUTO: 27.8 PG (ref 26.5–33)
MCHC RBC AUTO-ENTMCNC: 31.4 G/DL (ref 31.5–36.5)
MCV RBC AUTO: 89 FL (ref 78–100)
PLATELET # BLD AUTO: 346 10E3/UL (ref 150–450)
POTASSIUM SERPL-SCNC: 4.3 MMOL/L (ref 3.4–5.3)
RBC # BLD AUTO: 3.24 10E6/UL (ref 3.8–5.2)
RSV RNA SPEC QL NAA+PROBE: NOT DETECTED
RSV RNA SPEC QL NAA+PROBE: NOT DETECTED
RV+EV RNA SPEC QL NAA+PROBE: NOT DETECTED
SODIUM SERPL-SCNC: 138 MMOL/L (ref 135–145)
TROPONIN T SERPL HS-MCNC: 104 NG/L
TROPONIN T SERPL HS-MCNC: 93 NG/L
WBC # BLD AUTO: 7.7 10E3/UL (ref 4–11)

## 2025-01-04 PROCEDURE — 87486 CHLMYD PNEUM DNA AMP PROBE: CPT | Performed by: INTERNAL MEDICINE

## 2025-01-04 PROCEDURE — 120N000001 HC R&B MED SURG/OB

## 2025-01-04 PROCEDURE — 87581 M.PNEUMON DNA AMP PROBE: CPT | Performed by: INTERNAL MEDICINE

## 2025-01-04 PROCEDURE — 85027 COMPLETE CBC AUTOMATED: CPT | Performed by: PHYSICIAN ASSISTANT

## 2025-01-04 PROCEDURE — 82962 GLUCOSE BLOOD TEST: CPT

## 2025-01-04 PROCEDURE — 84484 ASSAY OF TROPONIN QUANT: CPT | Performed by: INTERNAL MEDICINE

## 2025-01-04 PROCEDURE — 99233 SBSQ HOSP IP/OBS HIGH 50: CPT | Performed by: INTERNAL MEDICINE

## 2025-01-04 PROCEDURE — 36415 COLL VENOUS BLD VENIPUNCTURE: CPT | Performed by: INTERNAL MEDICINE

## 2025-01-04 PROCEDURE — 250N000013 HC RX MED GY IP 250 OP 250 PS 637: Performed by: INTERNAL MEDICINE

## 2025-01-04 PROCEDURE — 93306 TTE W/DOPPLER COMPLETE: CPT | Mod: 26 | Performed by: INTERNAL MEDICINE

## 2025-01-04 PROCEDURE — 80048 BASIC METABOLIC PNL TOTAL CA: CPT | Performed by: PHYSICIAN ASSISTANT

## 2025-01-04 PROCEDURE — 250N000013 HC RX MED GY IP 250 OP 250 PS 637: Performed by: PHYSICIAN ASSISTANT

## 2025-01-04 PROCEDURE — 99222 1ST HOSP IP/OBS MODERATE 55: CPT | Mod: 25 | Performed by: INTERNAL MEDICINE

## 2025-01-04 PROCEDURE — 250N000012 HC RX MED GY IP 250 OP 636 PS 637: Performed by: INTERNAL MEDICINE

## 2025-01-04 PROCEDURE — 250N000012 HC RX MED GY IP 250 OP 636 PS 637: Performed by: PHYSICIAN ASSISTANT

## 2025-01-04 PROCEDURE — 85730 THROMBOPLASTIN TIME PARTIAL: CPT | Performed by: INTERNAL MEDICINE

## 2025-01-04 PROCEDURE — 93306 TTE W/DOPPLER COMPLETE: CPT

## 2025-01-04 PROCEDURE — 82435 ASSAY OF BLOOD CHLORIDE: CPT | Performed by: PHYSICIAN ASSISTANT

## 2025-01-04 RX ORDER — DEXTROSE MONOHYDRATE 25 G/50ML
25-50 INJECTION, SOLUTION INTRAVENOUS
Status: DISCONTINUED | OUTPATIENT
Start: 2025-01-04 | End: 2025-01-05 | Stop reason: HOSPADM

## 2025-01-04 RX ORDER — ESTRADIOL 0.1 MG/G
2 CREAM VAGINAL EVERY OTHER DAY
Status: DISCONTINUED | OUTPATIENT
Start: 2025-01-04 | End: 2025-01-05 | Stop reason: HOSPADM

## 2025-01-04 RX ORDER — NICOTINE POLACRILEX 4 MG
15-30 LOZENGE BUCCAL
Status: DISCONTINUED | OUTPATIENT
Start: 2025-01-04 | End: 2025-01-05 | Stop reason: HOSPADM

## 2025-01-04 RX ORDER — CALCITRIOL 0.25 UG/1
0.25 CAPSULE, LIQUID FILLED ORAL DAILY
Status: DISCONTINUED | OUTPATIENT
Start: 2025-01-04 | End: 2025-01-05 | Stop reason: HOSPADM

## 2025-01-04 RX ORDER — GUAR GUM
1 PACKET (EA) ORAL DAILY
Status: DISCONTINUED | OUTPATIENT
Start: 2025-01-04 | End: 2025-01-04 | Stop reason: ALTCHOICE

## 2025-01-04 RX ORDER — OMEGA-3-ACID ETHYL ESTERS 1 G/1
1 CAPSULE, LIQUID FILLED ORAL 2 TIMES DAILY
Status: DISCONTINUED | OUTPATIENT
Start: 2025-01-04 | End: 2025-01-05 | Stop reason: HOSPADM

## 2025-01-04 RX ORDER — SENNOSIDES 8.6 MG
1 TABLET ORAL AT BEDTIME
Status: DISCONTINUED | OUTPATIENT
Start: 2025-01-04 | End: 2025-01-05 | Stop reason: HOSPADM

## 2025-01-04 RX ORDER — EPINEPHRINE 0.3 MG/.3ML
0.3 INJECTION SUBCUTANEOUS
Status: DISCONTINUED | OUTPATIENT
Start: 2025-01-04 | End: 2025-01-05 | Stop reason: HOSPADM

## 2025-01-04 RX ADMIN — APIXABAN 2.5 MG: 2.5 TABLET, FILM COATED ORAL at 20:23

## 2025-01-04 RX ADMIN — SIROLIMUS 1 MG: 0.5 TABLET ORAL at 09:15

## 2025-01-04 RX ADMIN — URSODIOL 250 MG: 250 TABLET ORAL at 09:08

## 2025-01-04 RX ADMIN — URSODIOL 250 MG: 250 TABLET ORAL at 01:40

## 2025-01-04 RX ADMIN — SIROLIMUS 1 MG: 0.5 TABLET ORAL at 01:41

## 2025-01-04 RX ADMIN — PREDNISONE 10 MG: 10 TABLET ORAL at 09:11

## 2025-01-04 RX ADMIN — FOLIC ACID 1 MG: 1 TABLET ORAL at 09:08

## 2025-01-04 RX ADMIN — GABAPENTIN 300 MG: 250 SUSPENSION ORAL at 22:10

## 2025-01-04 RX ADMIN — INSULIN ASPART 2 UNITS: 100 INJECTION, SOLUTION INTRAVENOUS; SUBCUTANEOUS at 15:44

## 2025-01-04 RX ADMIN — PREDNISONE 10 MG: 10 TABLET ORAL at 01:41

## 2025-01-04 RX ADMIN — METOPROLOL SUCCINATE 25 MG: 25 TABLET, EXTENDED RELEASE ORAL at 11:06

## 2025-01-04 RX ADMIN — GABAPENTIN 300 MG: 250 SUSPENSION ORAL at 01:16

## 2025-01-04 RX ADMIN — METOPROLOL SUCCINATE 25 MG: 25 TABLET, EXTENDED RELEASE ORAL at 01:40

## 2025-01-04 RX ADMIN — ASPIRIN 81 MG: 81 TABLET, COATED ORAL at 01:15

## 2025-01-04 RX ADMIN — ASPIRIN 81 MG: 81 TABLET, COATED ORAL at 09:10

## 2025-01-04 RX ADMIN — LEVOTHYROXINE SODIUM 175 MCG: 0.12 TABLET ORAL at 09:09

## 2025-01-04 RX ADMIN — NYSTATIN: 100000 CREAM TOPICAL at 20:26

## 2025-01-04 RX ADMIN — METOPROLOL SUCCINATE 25 MG: 25 TABLET, EXTENDED RELEASE ORAL at 20:22

## 2025-01-04 RX ADMIN — HYDRALAZINE HYDROCHLORIDE 50 MG: 50 TABLET ORAL at 01:15

## 2025-01-04 RX ADMIN — CALCITRIOL CAPSULES 0.25 MCG 0.25 MCG: 0.25 CAPSULE ORAL at 18:07

## 2025-01-04 RX ADMIN — OMEGA-3-ACID ETHYL ESTERS 1 CAPSULE: 1 CAPSULE, LIQUID FILLED ORAL at 22:09

## 2025-01-04 RX ADMIN — URSODIOL 250 MG: 250 TABLET ORAL at 20:24

## 2025-01-04 ASSESSMENT — ACTIVITIES OF DAILY LIVING (ADL)
ADLS_ACUITY_SCORE: 58
ADLS_ACUITY_SCORE: 54
ADLS_ACUITY_SCORE: 58
ADLS_ACUITY_SCORE: 53
ADLS_ACUITY_SCORE: 54
ADLS_ACUITY_SCORE: 58
ADLS_ACUITY_SCORE: 58
ADLS_ACUITY_SCORE: 53
ADLS_ACUITY_SCORE: 53
ADLS_ACUITY_SCORE: 58
ADLS_ACUITY_SCORE: 53
ADLS_ACUITY_SCORE: 54

## 2025-01-04 NOTE — CONSULTS
[facility]    Cardiology Consultation     Date of Admission:  1/3/2025    Assessment & Plan   Luz Thompson is a 74 year old female who was admitted on 1/3/2025.    NSTEMI- lkely type 2 MI (demand ischemia)  Recommend lexiscan MPI when medically stable, can be done as outpt    H/o AFIB- on eliquis for anticoagulation, in NSR now.    Moderate complexity     Kymberly Hina Carpenter DO, MD    Primary Care Physician   Daniel Hidalgo    Reason for Consult   Reason for consult: I was asked by Colin to evaluate this patient for elevated cardiac enzymes.    History of Present Illness   Luz Thompson is a 74 year old female who presents with  history of primary biliary cirrhosis s/p liver transplant in 2002 on chronic immunosuppression, history of atrial fibrillation on anticoagulation, previous stroke, CKD stage III, frequent UTIs, hypertension, history of CVA, type 2 diabetes, chronic back and left knee pain, hypothyroidism and hyperlipidemia admitted on 1/3/2025 with fever and generalized weakness.     In the ED temp 99, pulse 64, pressure initially 65/36 but improved to 193/96 after IV fluids and breathing comfortably on room air without hypoxia.  Lab work shows a creatinine of 1.77 which is near baseline, BUN 45.5, elevated anion gap of 18, normal LFTs, glucose of 112, procalcitonin 0.15, lactic acid 1.3, high-sensitivity troponin of 47, WBC 9.2, hemoglobin 10.2 and platelet count 418.  Urinalysis did not appear infected, chest x-ray was clear, blood cultures x 2 were drawn and COVID/influenza/RSV were negative.  Subsequent troponin levels are 100, 83 in the setting of stage IV renal insufficiency.  Echo shows normal LV function and normal wall motion. 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         Past Surgical History   Past Surgical History:   Procedure Laterality Date    APPENDECTOMY  1961    CATARACT IOL, RT/LT      RE12/19/2013, LE12/10/2013 - Toric lenses    CHOLECYSTECTOMY  1991    COLONOSCOPY  03/10/2014    Procedure: COLONOSCOPY;;  Surgeon: Gentry Ramirez MD;  Location:  GI    CYSTOSCOPY      ear drum repair      ENDOBRONCHIAL ULTRASOUND FLEXIBLE N/A 09/29/2017    Procedure: ENDOBRONCHIAL ULTRASOUND FLEXIBLE;  Flexible Bronchoscopy, Endobronchial Ultrasound, Transbronchial Needle Aspiration ;  Surgeon: Eden Clinton MD;  Location:  OR    ENDOSCOPIC RETROGRADE CHOLANGIOPANCREATOGRAM  09/19/2013    Procedure: ENDOSCOPIC RETROGRADE CHOLANGIOPANCREATOGRAM;  Endoscopic Retrograde " Cholangiopancreatogram with single balloon enteroscopy, ballon sweep of bile duct;  Surgeon: Brett Membreno MD;  Location: [facility] OR    HC KNEE SCOPE,MED/LAT MENISECTOMY Right 08/10/2012    partial medial menisectomy only    KNEE SURGERY  1966    R knee    PICC INSERTION  09/18/2013    4fr SL PASV PICC, 40cm (1cm external) in the R basilic vein w/ tip in the low SVC    PICC INSERTION  02/21/2014    5 fr DL BioFlo Navilyst PICC, 46 cm (3 cm external) in the L basilic vein w/ tip in the SVC RA junction.    THYROIDECTOMY  03/2010    TRANSPLANT LIVER RECIPIENT LIVING UNRELATED  05/2002         Prior to Admission Medications   Prior to Admission Medications   Prescriptions Last Dose Informant Patient Reported? Taking?   Continuous Glucose Sensor (FREESTYLE NINO 2 SENSOR) MISC   No No   Sig: Change every 14 days.   D-MANNOSE PO 1/2/2025 Evening  Yes Yes   Sig: Take 1 Scoop by mouth 2 times daily.   EPINEPHrine (ANY BX GENERIC EQUIV) 0.3 MG/0.3ML injection 2-pack Unknown  No Yes   Sig: Inject 0.3 mLs (0.3 mg) into the muscle as needed for anaphylaxis. May repeat one time in 5-15 minutes if response to initial dose is inadequate.   Ferrous Sulfate 324 MG TBEC Past Week  Yes Yes   Sig: Take 1 tablet by mouth daily.   Multiple Vitamins-Minerals (PRESERVISION AREDS 2) CAPS 1/2/2025 Evening  No Yes   Sig: Take 1 capsule by mouth 2 times daily   Nutrisource Fiber PO packet 1/2/2025 Morning  Yes Yes   Sig: Take 1 packet by mouth daily.   apixaban ANTICOAGULANT (ELIQUIS ANTICOAGULANT) 2.5 MG tablet 1/2/2025 Evening  No Yes   Sig: Take 1 tablet (2.5 mg) by mouth 2 times daily   calcitRIOL (ROCALTROL) 0.25 MCG capsule 1/2/2025 Morning  No Yes   Sig: Take 1 capsule (0.25 mcg) by mouth daily.   estradiol (ESTRACE) 0.1 MG/GM vaginal cream Past Week  Yes Yes   Sig: Place vaginally every other day.   evolocumab (REPATHA SURECLICK) 140 MG/ML prefilled autoinjector Past Month  No Yes   Sig: Inject 1 mL (140 mg) subcutaneously every 14 days. .  Please have fasting labs drawn for further refills.   ezetimibe (ZETIA) 10 MG tablet 1/2/2025 Evening  No Yes   Sig: Take 1 tablet (10 mg) by mouth daily.   folic acid (FOLVITE) 1 MG tablet 1/2/2025 Morning  No Yes   Sig: Take 1 tablet (1 mg) by mouth daily.   gabapentin (NEURONTIN) 250 MG/5ML solution Unknown  No Yes   Sig: Take 6 mLs (300 mg) by mouth at bedtime   glucose (BD GLUCOSE) 5 g chewable tablet Unknown  No Yes   Sig: Take 2 tablets (10 g) by mouth as needed (low blood sugar)   hydrALAZINE (APRESOLINE) 50 MG tablet 1/2/2025 Evening  No Yes   Sig: Take 1 tablet (50 mg) by mouth 3 times daily.   hypromellose (ARTIFICIAL TEARS) 0.4 % SOLN ophthalmic solution Unknown  No Yes   Sig: Apply 1 drop to eye every hour as needed for dry eyes   ketoconazole (NIZORAL) 2 % external cream 1/2/2025  No Yes   Sig: Apply topically 2 times daily as needed (redness and flaking). Apply to the face.   ketoconazole (NIZORAL) 2 % external shampoo 1/2/2025  No Yes   Sig: Apply topically three times a week. Lather in the shower, wait 3-5 minutes before rinsing.   levothyroxine (SYNTHROID/LEVOTHROID) 175 MCG tablet 1/2/2025 Morning  No Yes   Sig: Take 1 tablet (175 mcg) by mouth daily.   linagliptin (TRADJENTA) 5 MG TABS tablet 1/2/2025 Morning  No Yes   Sig: Take 1 tablet (5 mg) by mouth daily.   meclizine (ANTIVERT) 25 MG tablet Unknown  No Yes   Sig: Take 1 tablet (25 mg) by mouth as needed for dizziness or other (migraines)   metoprolol succinate ER (TOPROL XL) 25 MG 24 hr tablet 1/2/2025 Evening  Yes Yes   Sig: Take 1 tablet (25 mg) by mouth 2 times daily. Take an extra tablet daily as needed for breakthrough afib. May repeat in two hours if heart rate remains >100bpm (no more than 4 tablets per day).   omega-3 acid ethyl esters (LOVAZA) 1 g capsule 1/2/2025 Evening  No Yes   Sig: Take 1 capsule by mouth 2 times daily   predniSONE (DELTASONE) 10 MG tablet 1/2/2025 Morning  No Yes   Sig: Take 1 tablet (10 mg) by mouth daily.    sirolimus (GENERIC EQUIVALENT) 1 MG tablet 1/2/2025 Morning  No Yes   Sig: Take 1 tablet (1 mg) by mouth daily.   ursodiol (ACTIGALL) 250 MG tablet 1/2/2025 Evening  No Yes   Sig: Take 1 tablet (250 mg) by mouth 2 times daily.      Facility-Administered Medications: None     Current Facility-Administered Medications   Medication Dose Route Frequency Provider Last Rate Last Admin    acetaminophen (TYLENOL) tablet 650 mg  650 mg Oral Q4H PRN Agustina Thompson PA-C        Or    acetaminophen (TYLENOL) Suppository 650 mg  650 mg Rectal Q4H PRN Agustina Thompson PA-C        [Held by provider] apixaban ANTICOAGULANT (ELIQUIS) tablet 2.5 mg  2.5 mg Oral BID Agustina Thompson PA-C        aspirin EC tablet 81 mg  81 mg Oral Daily Agustina Thompson PA-C   81 mg at 01/04/25 0910    glucose gel 15-30 g  15-30 g Oral Q15 Min PRN Kenneth hSaw MD        Or    dextrose 50 % injection 25-50 mL  25-50 mL Intravenous Q15 Min PRN Kenneth Shaw MD        Or    glucagon injection 1 mg  1 mg Subcutaneous Q15 Min PRN Kenneth Shaw MD        folic acid (FOLVITE) tablet 1 mg  1 mg Oral Daily Agustina Thompson PA-C   1 mg at 01/04/25 0908    gabapentin (NEURONTIN) solution 300 mg  300 mg Oral At Bedtime Agustina Thompson PA-C   300 mg at 01/04/25 0116    [Held by provider] hydrALAZINE (APRESOLINE) tablet 50 mg  50 mg Oral TID Agustina Thompson PA-C   50 mg at 01/04/25 0115    insulin aspart (NovoLOG) injection (RAPID ACTING)  1-7 Units Subcutaneous TID AC Kenneth Shaw MD        insulin aspart (NovoLOG) injection (RAPID ACTING)  1-5 Units Subcutaneous At Bedtime Kenneth Shaw MD        levothyroxine (SYNTHROID/LEVOTHROID) tablet 175 mcg  175 mcg Oral Daily Agustina Thompson PA-C   175 mcg at 01/04/25 0909    lidocaine (LMX4) cream   Topical Q1H PRN Agustina Thompson PA-C        lidocaine 1 % 0.1-1 mL  0.1-1 mL Other Q1H PRN Agustina Thompson PA-C         melatonin tablet 1 mg  1 mg Oral At Bedtime PRN Agustina Thompson PA-C        metoprolol succinate ER (TOPROL XL) 24 hr tablet 25 mg  25 mg Oral BID Agustina Thompson PA-C   25 mg at 01/04/25 1106    nystatin (MYCOSTATIN) cream   Topical BID Agustina Thompson PA-C        ondansetron (ZOFRAN ODT) ODT tab 4 mg  4 mg Oral Q6H PRN Agustina Thompson PA-C        Or    ondansetron (ZOFRAN) injection 4 mg  4 mg Intravenous Q6H PRN Agustina Thompson PA-C        Patient is already receiving anticoagulation with heparin, enoxaparin (LOVENOX), warfarin (COUMADIN)  or other anticoagulant medication   Does not apply Continuous PRN Agustina Thompson PA-C        predniSONE (DELTASONE) tablet 10 mg  10 mg Oral Daily Agustina Thompson PA-C   10 mg at 01/04/25 0911    prochlorperazine (COMPAZINE) injection 5 mg  5 mg Intravenous Q6H PRN Agustina Thompson PA-C        Or    prochlorperazine (COMPAZINE) tablet 5 mg  5 mg Oral Q6H PRN Agustina Thompson PA-C        senna-docusate (SENOKOT-S/PERICOLACE) 8.6-50 MG per tablet 1 tablet  1 tablet Oral BID PRN Agustina Thompson PA-C        Or    senna-docusate (SENOKOT-S/PERICOLACE) 8.6-50 MG per tablet 2 tablet  2 tablet Oral BID PRN Agustina Thompson PA-C        sirolimus (GENERIC EQUIVALENT) tablet 1 mg  1 mg Oral Daily Agustina Thompson PA-C   1 mg at 01/04/25 0915    sodium chloride (PF) 0.9% PF flush 3 mL  3 mL Intracatheter q1 min prn Agustina Thompson PA-C        sodium chloride (PF) 0.9% PF flush 3 mL  3 mL Intracatheter Q8H Agustina Thompson PA-C        sodium chloride 0.9% BOLUS 500 mL  500 mL Intravenous Once Kenneth Shaw MD   Held at 01/04/25 1144    ursodiol (ACTIGALL) tablet 250 mg  250 mg Oral BID Agustina Thompson PA-C   250 mg at 01/04/25 0908     Current Outpatient Medications   Medication Sig Dispense Refill    apixaban ANTICOAGULANT (ELIQUIS ANTICOAGULANT)  2.5 MG tablet Take 1 tablet (2.5 mg) by mouth 2 times daily 180 tablet 3    calcitRIOL (ROCALTROL) 0.25 MCG capsule Take 1 capsule (0.25 mcg) by mouth daily. 90 capsule 1    D-MANNOSE PO Take 1 Scoop by mouth 2 times daily.      EPINEPHrine (ANY BX GENERIC EQUIV) 0.3 MG/0.3ML injection 2-pack Inject 0.3 mLs (0.3 mg) into the muscle as needed for anaphylaxis. May repeat one time in 5-15 minutes if response to initial dose is inadequate. 2 each 1    estradiol (ESTRACE) 0.1 MG/GM vaginal cream Place vaginally every other day.      evolocumab (REPATHA SURECLICK) 140 MG/ML prefilled autoinjector Inject 1 mL (140 mg) subcutaneously every 14 days. . Please have fasting labs drawn for further refills. 6 mL 3    ezetimibe (ZETIA) 10 MG tablet Take 1 tablet (10 mg) by mouth daily. 90 tablet 3    Ferrous Sulfate 324 MG TBEC Take 1 tablet by mouth daily.      folic acid (FOLVITE) 1 MG tablet Take 1 tablet (1 mg) by mouth daily. 100 tablet 0    gabapentin (NEURONTIN) 250 MG/5ML solution Take 6 mLs (300 mg) by mouth at bedtime 180 mL 0    glucose (BD GLUCOSE) 5 g chewable tablet Take 2 tablets (10 g) by mouth as needed (low blood sugar) 40 tablet 1    hydrALAZINE (APRESOLINE) 50 MG tablet Take 1 tablet (50 mg) by mouth 3 times daily. 270 tablet 3    hypromellose (ARTIFICIAL TEARS) 0.4 % SOLN ophthalmic solution Apply 1 drop to eye every hour as needed for dry eyes      ketoconazole (NIZORAL) 2 % external cream Apply topically 2 times daily as needed (redness and flaking). Apply to the face. 30 g 3    ketoconazole (NIZORAL) 2 % external shampoo Apply topically three times a week. Lather in the shower, wait 3-5 minutes before rinsing. 100 mL 11    levothyroxine (SYNTHROID/LEVOTHROID) 175 MCG tablet Take 1 tablet (175 mcg) by mouth daily. 90 tablet 1    linagliptin (TRADJENTA) 5 MG TABS tablet Take 1 tablet (5 mg) by mouth daily. 90 tablet 1    meclizine (ANTIVERT) 25 MG tablet Take 1 tablet (25 mg) by mouth as needed for dizziness  or other (migraines) 30 tablet 11    metoprolol succinate ER (TOPROL XL) 25 MG 24 hr tablet Take 1 tablet (25 mg) by mouth 2 times daily. Take an extra tablet daily as needed for breakthrough afib. May repeat in two hours if heart rate remains >100bpm (no more than 4 tablets per day).      Multiple Vitamins-Minerals (PRESERVISION AREDS 2) CAPS Take 1 capsule by mouth 2 times daily      Nutrisource Fiber PO packet Take 1 packet by mouth daily.      omega-3 acid ethyl esters (LOVAZA) 1 g capsule Take 1 capsule by mouth 2 times daily 180 capsule 1    predniSONE (DELTASONE) 10 MG tablet Take 1 tablet (10 mg) by mouth daily. 90 tablet 3    sirolimus (GENERIC EQUIVALENT) 1 MG tablet Take 1 tablet (1 mg) by mouth daily. 90 tablet 3    ursodiol (ACTIGALL) 250 MG tablet Take 1 tablet (250 mg) by mouth 2 times daily. 180 tablet 3    Continuous Glucose Sensor (FREESTYLE NINO 2 SENSOR) MISC Change every 14 days. 2 each 5     Current Facility-Administered Medications   Medication Dose Route Frequency Provider Last Rate Last Admin    acetaminophen (TYLENOL) tablet 650 mg  650 mg Oral Q4H PRN Agustina Thompson PA-C        Or    acetaminophen (TYLENOL) Suppository 650 mg  650 mg Rectal Q4H PRN Agustina Thompson PA-C        [Held by provider] apixaban ANTICOAGULANT (ELIQUIS) tablet 2.5 mg  2.5 mg Oral BID Agustina Thompson PA-C        aspirin EC tablet 81 mg  81 mg Oral Daily Agustina Thompson PA-C   81 mg at 01/04/25 0910    glucose gel 15-30 g  15-30 g Oral Q15 Min PRN Kenneth Shaw MD        Or    dextrose 50 % injection 25-50 mL  25-50 mL Intravenous Q15 Min PRN Kenneth Shaw MD        Or    glucagon injection 1 mg  1 mg Subcutaneous Q15 Min PRN Kenneth Shaw MD        folic acid (FOLVITE) tablet 1 mg  1 mg Oral Daily Agustina Thompson PA-C   1 mg at 01/04/25 0908    gabapentin (NEURONTIN) solution 300 mg  300 mg Oral At Bedtime Agustina Thompson PA-C   300 mg at 01/04/25  0116    [Held by provider] hydrALAZINE (APRESOLINE) tablet 50 mg  50 mg Oral TID Agustina Thompson PA-C   50 mg at 01/04/25 0115    insulin aspart (NovoLOG) injection (RAPID ACTING)  1-7 Units Subcutaneous TID AC Kenneth Shaw MD        insulin aspart (NovoLOG) injection (RAPID ACTING)  1-5 Units Subcutaneous At Bedtime Kenneth Shaw MD        levothyroxine (SYNTHROID/LEVOTHROID) tablet 175 mcg  175 mcg Oral Daily Agustina Thompson PA-C   175 mcg at 01/04/25 0909    lidocaine (LMX4) cream   Topical Q1H PRN Agustina Thompson PA-C        lidocaine 1 % 0.1-1 mL  0.1-1 mL Other Q1H PRN Agustina Thompson PA-C        melatonin tablet 1 mg  1 mg Oral At Bedtime PRN Agustina Thompson PA-C        metoprolol succinate ER (TOPROL XL) 24 hr tablet 25 mg  25 mg Oral BID Agustina Thompson PA-C   25 mg at 01/04/25 1106    nystatin (MYCOSTATIN) cream   Topical BID Agustina Thompson PA-C        ondansetron (ZOFRAN ODT) ODT tab 4 mg  4 mg Oral Q6H PRN Agustina Thompson PA-C        Or    ondansetron (ZOFRAN) injection 4 mg  4 mg Intravenous Q6H PRN Agustina Thompson PA-C        Patient is already receiving anticoagulation with heparin, enoxaparin (LOVENOX), warfarin (COUMADIN)  or other anticoagulant medication   Does not apply Continuous PRN Agustina Thompson PA-C        predniSONE (DELTASONE) tablet 10 mg  10 mg Oral Daily Agustina Thompson PA-C   10 mg at 01/04/25 0911    prochlorperazine (COMPAZINE) injection 5 mg  5 mg Intravenous Q6H PRN Agustnia Thompson PA-C        Or    prochlorperazine (COMPAZINE) tablet 5 mg  5 mg Oral Q6H PRN Agustina Thompson PA-C        senna-docusate (SENOKOT-S/PERICOLACE) 8.6-50 MG per tablet 1 tablet  1 tablet Oral BID PRN Agustina Thompson PA-C        Or    senna-docusate (SENOKOT-S/PERICOLACE) 8.6-50 MG per tablet 2 tablet  2 tablet Oral BID PRN Agustina Thompson PA-C        sirolimus  (GENERIC EQUIVALENT) tablet 1 mg  1 mg Oral Daily Agustina Thompson PA-C   1 mg at 01/04/25 0915    sodium chloride (PF) 0.9% PF flush 3 mL  3 mL Intracatheter q1 min prn Agustina Thompson PA-C        sodium chloride (PF) 0.9% PF flush 3 mL  3 mL Intracatheter Q8H Agustina Thompson PA-C        sodium chloride 0.9% BOLUS 500 mL  500 mL Intravenous Once Kenneth Shaw MD   Held at 01/04/25 1144    ursodiol (ACTIGALL) tablet 250 mg  250 mg Oral BID Agustina Thompson PA-C   250 mg at 01/04/25 0908     Current Outpatient Medications   Medication Sig Dispense Refill    apixaban ANTICOAGULANT (ELIQUIS ANTICOAGULANT) 2.5 MG tablet Take 1 tablet (2.5 mg) by mouth 2 times daily 180 tablet 3    calcitRIOL (ROCALTROL) 0.25 MCG capsule Take 1 capsule (0.25 mcg) by mouth daily. 90 capsule 1    D-MANNOSE PO Take 1 Scoop by mouth 2 times daily.      EPINEPHrine (ANY BX GENERIC EQUIV) 0.3 MG/0.3ML injection 2-pack Inject 0.3 mLs (0.3 mg) into the muscle as needed for anaphylaxis. May repeat one time in 5-15 minutes if response to initial dose is inadequate. 2 each 1    estradiol (ESTRACE) 0.1 MG/GM vaginal cream Place vaginally every other day.      evolocumab (REPATHA SURECLICK) 140 MG/ML prefilled autoinjector Inject 1 mL (140 mg) subcutaneously every 14 days. . Please have fasting labs drawn for further refills. 6 mL 3    ezetimibe (ZETIA) 10 MG tablet Take 1 tablet (10 mg) by mouth daily. 90 tablet 3    Ferrous Sulfate 324 MG TBEC Take 1 tablet by mouth daily.      folic acid (FOLVITE) 1 MG tablet Take 1 tablet (1 mg) by mouth daily. 100 tablet 0    gabapentin (NEURONTIN) 250 MG/5ML solution Take 6 mLs (300 mg) by mouth at bedtime 180 mL 0    glucose (BD GLUCOSE) 5 g chewable tablet Take 2 tablets (10 g) by mouth as needed (low blood sugar) 40 tablet 1    hydrALAZINE (APRESOLINE) 50 MG tablet Take 1 tablet (50 mg) by mouth 3 times daily. 270 tablet 3    hypromellose (ARTIFICIAL TEARS) 0.4 % SOLN  ophthalmic solution Apply 1 drop to eye every hour as needed for dry eyes      ketoconazole (NIZORAL) 2 % external cream Apply topically 2 times daily as needed (redness and flaking). Apply to the face. 30 g 3    ketoconazole (NIZORAL) 2 % external shampoo Apply topically three times a week. Lather in the shower, wait 3-5 minutes before rinsing. 100 mL 11    levothyroxine (SYNTHROID/LEVOTHROID) 175 MCG tablet Take 1 tablet (175 mcg) by mouth daily. 90 tablet 1    linagliptin (TRADJENTA) 5 MG TABS tablet Take 1 tablet (5 mg) by mouth daily. 90 tablet 1    meclizine (ANTIVERT) 25 MG tablet Take 1 tablet (25 mg) by mouth as needed for dizziness or other (migraines) 30 tablet 11    metoprolol succinate ER (TOPROL XL) 25 MG 24 hr tablet Take 1 tablet (25 mg) by mouth 2 times daily. Take an extra tablet daily as needed for breakthrough afib. May repeat in two hours if heart rate remains >100bpm (no more than 4 tablets per day).      Multiple Vitamins-Minerals (PRESERVISION AREDS 2) CAPS Take 1 capsule by mouth 2 times daily      Nutrisource Fiber PO packet Take 1 packet by mouth daily.      omega-3 acid ethyl esters (LOVAZA) 1 g capsule Take 1 capsule by mouth 2 times daily 180 capsule 1    predniSONE (DELTASONE) 10 MG tablet Take 1 tablet (10 mg) by mouth daily. 90 tablet 3    sirolimus (GENERIC EQUIVALENT) 1 MG tablet Take 1 tablet (1 mg) by mouth daily. 90 tablet 3    ursodiol (ACTIGALL) 250 MG tablet Take 1 tablet (250 mg) by mouth 2 times daily. 180 tablet 3    Continuous Glucose Sensor (FREESTYLE NINO 2 SENSOR) MISC Change every 14 days. 2 each 5     Allergies   Allergies   Allergen Reactions    Fluconazole Hives and Itching     Full body hives    [See intradermal skin testing results from 8/2/2019]    Mycophenolate Diarrhea and Nausea and Vomiting     Patient stated it was chronic and lasted months      Penicillins Anaphylaxis, Hives, Itching and Rash     [See intradermal skin testing results from 8/2/2019]       Simvastatin Muscle Pain (Myalgia)     severe  Other reaction(s): Myalgia caused by statin    Methotrexate Other (See Comments)     Other reaction(s): Sore  Sores in mouth, esophagus, and stomach.       Morphine And Codeine Itching and Other (See Comments)     Psych disturbance  Other reaction(s): Confusion, Mood alteration    Quinolones Anxiety, Dizziness, Headache, Other (See Comments), Palpitations and Unknown     Other reaction(s): Hyperactive behavior, Lightheadedness, Mood alteration    Dizzy, light headed    Dizziness, shaky, and jumpy    Benadryl [Diphenhydramine Hcl]      Insomnia     Capsules, Empty Gelatin [Gelatin]     Lansoprazole Diarrhea    Azithromycin Itching     [See intradermal skin testing results from 8/2/2019]    Bactrim [Sulfamethoxazole-Trimethoprim] Other (See Comments)     Numb mouth, tingling lips (treated with anti-histamines)    Cephalosporins Itching     [See intradermal skin testing results from 8/2/2019]    Ciprofloxacin Hcl Other (See Comments) and Dizziness     Insomnia, mood lability, Irregular heart beat         Doxycycline Itching and Unknown     [See intradermal skin testing results from 8/2/2019]    Lisinopril Cough    Omeprazole Itching    Tolectin [Nsaids] Rash    Tolmetin Rash and Itching    Tramadol Rash, Hives and Itching       Social History    reports that she quit smoking about 39 years ago. Her smoking use included cigarettes. She started smoking about 57 years ago. She has a 18 pack-year smoking history. She has never used smokeless tobacco. She reports current alcohol use. She reports that she does not use drugs.      Family History   I have reviewed this patient's family history and updated it with pertinent information if needed.  Family History   Problem Relation Age of Onset    Hypertension Mother     Endometrial Cancer Mother     Hyperlipidemia Mother     Prostate Cancer Father     Macular Degeneration Father     Cancer - colorectal Maternal Grandmother          "in her 80's, has surgery and removal of part of kidney,  at age 98    Heart Disease Maternal Grandfather          at 98    Glaucoma Maternal Grandfather     Cerebrovascular Disease Paternal Grandmother         in her 80's    Hypertension Paternal Grandmother     Heart Disease Paternal Grandfather         MI    Alzheimer Disease Paternal Grandfather     Allergies Son     Neurologic Disorder Daughter         Migraines    Breast Cancer Other     Anesthesia Reaction No family hx of     Crohn's Disease No family hx of     Ulcerative Colitis No family hx of     Melanoma No family hx of     Skin Cancer No family hx of           Review of Systems   A comprehensive review of system was performed and is negative other than that noted in the HPI or here.     Physical Exam   Vital Signs with Ranges  Temp:  [99  F (37.2  C)] 99  F (37.2  C)  Pulse:  [] 66  Resp:  [10-22] 16  BP: ()/() 91/48  SpO2:  [90 %-98 %] 97 %  Wt Readings from Last 4 Encounters:   25 79.4 kg (175 lb)   25 79.4 kg (175 lb)   12/10/24 79.4 kg (175 lb)   24 80.7 kg (178 lb)     No intake/output data recorded.      Vitals: BP 91/48   Pulse 66   Temp 99  F (37.2  C) (Oral)   Resp 16   Ht 1.702 m (5' 7\")   Wt 79.4 kg (175 lb)   LMP 1988 (Approximate)   SpO2 97%   BMI 27.41 kg/m      Physical Exam:   General - Alert and oriented to time place and person in no acute distress  Eyes - No scleral icterus  HEENT - Neck supple, moist mucous membranes  Cardiovascular -   Extremities - There is no edema  Respiratory - diminished anteriorly  Skin - No pallor or cyanosis  Gastrointestinal - Non tender   Psych - Appropriate affect   Neurological - No gross motor neurological focal deficits    No lab results found in last 7 days.    Invalid input(s): \"TROPONINIES\"    Recent Labs   Lab 25  0742 25  1733   WBC 7.7 9.2   HGB 9.0* 10.2*   MCV 89 89    418   INR  --  1.23*    139   POTASSIUM 4.3 " "3.5   CHLORIDE 108* 103   CO2 17* 18*   BUN 39.3* 45.5*   CR 1.68* 1.77*   GFRESTIMATED 31* 29*   ANIONGAP 13 18*   KEILY 8.5* 9.2   * 112*   ALBUMIN  --  3.5   PROTTOTAL  --  6.3*   BILITOTAL  --  0.4   ALKPHOS  --  108   ALT  --  26   AST  --  22   LIPASE  --  29     Recent Labs   Lab Test 09/04/24  1008 09/18/20  0000 05/20/19  0957 12/28/18  0000   CHOL 291* 197   < > 225*   HDL 72 42   < > 47   * 78   < > 110   TRIG 329* 432   < > 339*   CHOLHDLRATIO  --   --   --  4.8*    < > = values in this interval not displayed.     Recent Labs   Lab 01/04/25  0742 01/03/25  1733   WBC 7.7 9.2   HGB 9.0* 10.2*   HCT 28.7* 32.2*   MCV 89 89    418     Recent Labs   Lab 01/03/25  1735   PHV 7.47*   PO2V 42   PCO2V 28*   HCO3V 20*     No results for input(s): \"NTBNPI\", \"NTBNP\" in the last 168 hours.  No results for input(s): \"DD\" in the last 168 hours.  No results for input(s): \"SED\", \"CRP\" in the last 168 hours.  Recent Labs   Lab 01/04/25  0742 01/03/25  1733    418     No results for input(s): \"TSH\" in the last 168 hours.  Recent Labs   Lab 01/03/25  1934   COLOR Light Yellow   APPEARANCE Clear   URINEGLC Negative   URINEBILI Negative   URINEKETONE Negative   SG 1.012   UBLD Negative   URINEPH 5.5   PROTEIN 10*   NITRITE Negative   LEUKEST Trace*   RBCU 1   WBCU <1       Imaging:  Recent Results (from the past 48 hours)   Chest XR,  PA & LAT    Narrative    EXAM: XR CHEST 2 VIEWS  LOCATION: [facility]  DATE: 1/3/2025    INDICATION: Cough, fever  COMPARISON: 09/22/2024.      Impression    IMPRESSION: Cardiac silhouette is within normal limits. No focal airspace consolidation. Trace left pleural effusion. No discernible pneumothorax. Left chest loop recorder.   Echocardiogram Complete   Result Value    LVEF  60-65%    Narrative    [id number]  [id number]  [id number]  [id number]^MARY JO^CARMEN^[facility]     [facility]  Echocardiography Laboratory  [address]  [place] [address]     Name: ISA CAMARGO  MRN: [id number]  : 1950  Study Date: 2025 07:12 AM  Age: 74 yrs  Gender: Female  Patient Location: [facility]  Reason For Study: MI  Ordering Physician: CARMEN CAMARGO  Referring Physician: Daniel Hidalgo  Performed By: Maddi Cho     BSA: 1.9 m2  Height: 67 in  Weight: 175 lb  HR: 79  BP: 97/47 mmHg  ______________________________________________________________________________  Procedure  Echocardiogram with two-dimensional, color and spectral Doppler. Technically  difficult study.Extremely poor acoustic windows.  ______________________________________________________________________________  Interpretation Summary     Technically difficult study with poor acoustic windows. Patient refused echo  contrast and terminated study early.     1. LV appears normal in size with mild concentric LVH. LVEF is normal with  LVEF 60-65%. No obvious wall motion abnormalities.  2. Normal RV size and systolic function.  3. LA is normal size. RA not well visualized.  4. Valvular anatomy is not well seen due to poor 2D image quality, though  there does appear to be thickening and calcification of both the aortic and  mitral valve leaflets. No significant valvular stenosis or regurgitation,  however, on Doppler assessment.  5. No pericardial effusion noted on limited windows.     Prior TTE from 2018 showed normal LVEF with 1-2+ MR.  ______________________________________________________________________________  Left Ventricle  The left ventricle is normal in size. There is mild concentric left  ventricular hypertrophy. Left ventricular systolic function is normal. The  visual ejection fraction is 60-65%. Left ventricular diastolic function is  indeterminate. No regional wall motion abnormalities noted.     Right Ventricle  The right ventricle is normal size. The right ventricular systolic function is  normal.     Atria  Normal left atrial size. Right  atrium not well visualized.     Mitral Valve  Mitral valve leaflets are thickened and calcified with restricted motion.  There is mild mitral annular calcification. There is trace mitral  regurgitation. The mean mitral valve gradient is 3.2 mmHg.     Tricuspid Valve  The tricuspid valve is not well visualized. There is trace tricuspid  regurgitation. Right ventricular systolic pressure could not be approximated  due to inadequate tricuspid regurgitation.     Aortic Valve  Aortic valve is not well visualized but probably trileaflet, with moderate  sclerosis of the LCC and NCC. There is trace to mild aortic regurgitation. No  aortic stenosis is present.     Pulmonic Valve  The pulmonic valve is not well visualized.     Vessels  The aortic root is normal size. Normal size ascending aorta.     Pericardium  The pericardium is not well visualized. There is no pericardial effusion.     ______________________________________________________________________________  MMode/2D Measurements & Calculations  Ao root diam: 3.0 cm  asc Aorta Diam: 3.5 cm  LVOT diam: 1.9 cm  LVOT area: 3.0 cm2  Ao root diam index Ht(cm/m): 1.8     Ao root diam index BSA (cm/m2): 1.6  Asc Ao diam index BSA (cm/m2): 1.9  Asc Ao diam index Ht(cm/m): 2.1  LA Volume (BP): 48.3 ml  LA Volume Index (BP): 25.3 ml/m2  TAPSE: 2.5 cm     Doppler Measurements & Calculations  MV E max nate: 78.2 cm/sec  MV A max nate: 99.2 cm/sec  MV E/A: 0.79  MV max P.6 mmHg  MV mean PG: 3.2 mmHg  MV V2 VTI: 32.2 cm  MVA(VTI): 2.3 cm2     MV dec slope: 325.6 cm/sec2  MV dec time: 0.24 sec  Ao V2 max: 191.5 cm/sec  Ao max PG: 15.0 mmHg  Ao V2 mean: 131.5 cm/sec  Ao mean P.0 mmHg  Ao V2 VTI: 33.8 cm  MAYUR(I,D): 2.2 cm2  MAYUR(V,D): 2.2 cm2  LV V1 max P.6 mmHg  LV V1 max: 138.0 cm/sec  LV V1 VTI: 25.2 cm  SV(LVOT): 75.2 ml  SI(LVOT): 39.4 ml/m2  PA V2 max: 118.1 cm/sec  PA max P.6 mmHg  PA acc time: 0.09 sec  AV Nate Ratio (DI): 0.72  MAYUR Index (cm2/m2): 1.2  E/E' avg:  "15.0  Lateral E/e': 13.3  Medial E/e': 16.7  RV S Nate: 13.2 cm/sec     ______________________________________________________________________________  Report approved by: MD Mauricio Villar on 01/04/2025 11:46 AM             Echo:  No results found for this or any previous visit (from the past 4320 hours).    Clinically Significant Risk Factors Present on Admission          # Hyperchloremia: Highest Cl = 108 mmol/L in last 2 days, will monitor as appropriate      # Hypocalcemia: Lowest Ca = 8.5 mg/dL in last 2 days, will monitor and replace as appropriate      # Drug Induced Coagulation Defect: home medication list includes an anticoagulant medication    # Hypertension: Noted on problem list      # Anemia: based on hgb <11      # DMII: A1C = 6.9 % (Ref range: <5.7 %) within past 6 months    # Overweight: Estimated body mass index is 27.41 kg/m  as calculated from the following:    Height as of this encounter: 1.702 m (5' 7\").    Weight as of this encounter: 79.4 kg (175 lb).                                                                 "

## 2025-01-04 NOTE — PROGRESS NOTES
[facility]  Hospitalist Progress Note  Kenneth Shaw M.D., M.B.A.   01/04/2025    Reason for Stay/active problem list    Acute febrile illness  Transient hypotension         Assessment and Plan:        Summary of Stay:     Luz Thompson is a medically complex 74 year old female with history of primary biliary cirrhosis s/p liver transplant in 2002 on chronic immunosuppression, history of atrial fibrillation on anticoagulation, previous stroke, CKD stage III, frequent UTIs, hypertension, history of CVA, type 2 diabetes, chronic back and left knee pain, hypothyroidism and hyperlipidemia admitted on 1/3/2025 with fever and generalized weakness.     In the ED temp 99, pulse 64, pressure initially 65/36 but improved to 193/96 after IV fluids and breathing comfortably on room air without hypoxia.  Lab work shows a creatinine of 1.77 which is near baseline, BUN 45.5, elevated anion gap of 18, normal LFTs, glucose of 112, procalcitonin 0.15, lactic acid 1.3, high-sensitivity troponin of 47, WBC 9.2, hemoglobin 10.2 and platelet count 418.  Urinalysis did not appear infected, chest x-ray was clear, blood cultures x 2 were drawn and COVID/influenza/RSV were negative.  ED ordered almost 2 L of fluid and cefepime/vancomycin with admission request        Problem List with Assessment and Plan:      # Acute febrile illness of unclear etiology-suspect upper respiratory illness  -Patient reports low-grade fever in the 99 range for most of the week with cold-like symptoms but had a fever up to 101 today without any other associated symptoms including cough, nausea, vomiting, abdominal pain, urinary symptoms or diarrhea.  She did feel short of breath earlier but that is since resolved.  -In the ED temp was 99 and she was initially hypotensive at 65/36 but received a liter of IV fluids and is now 193/96  -WBC is normal, lactic acid is normal, procalcitonin is 0.15 arguing against bacterial infection,  urinalysis does not appear infected, chest x-ray does not show pneumonia, COVID/influenza/RSV are negative, blood cultures x 2 were drawn.  She does not have a severe headache to suggest meningitis  -Initially ED ordered cefepime and vancomycin for broad spectrum coverage but antibiotic was discontinued due to lack of source identification  -- Patient has been having some nasal congestion, runny nose the last few days.  She tested negative for influenza A, B, RSV and COVID 19.  Currently afebrile.  Suspected febrile illness is due to upper respiratory infection.  No indication of pneumonia.  Will get respiratory panel, continue supportive care, continue to hold antibiotic for now.  Monitor cultures       # Dizziness with transient hypotension  -Patient had dizziness while in the waiting room with pressures as low as 65/36.  She has received 2 L of IV fluids and is not dizzy any longer with elevated pressures now. Creatinine was a little higher than baseline suggesting some degree of dehydration?  -She has been intermittently hypotensive, responded to fluid resuscitation.  Continue to monitor for now.  Doubt severe sepsis or septic shock     # History of liver transplant in 2002 secondary to primary biliary cirrhosis  # Chronic immunosuppression  -Continue prednisone 10 mg daily, serial Jolley 1 mg daily and ursodiol 250 mg twice daily     # CKD stage 2III   -Patient has labile creatinines ranging from 1.5-1.9 with creatinine of 1.77 in the ED  -Likely some degree of dehydration with continued fevers all week  -Recheck BMP in a.m.        # Atrial fibrillation  # History of CVA  -Continue Eliquis  -Continue Toprol-XL     # Type 2 diabetes  -A1c was 6.9 on December 10  -Holding linagliptin, sliding scale insulin     # Hypertension  -Will hold blood pressure meds due to soft blood pressure     # Hyperlipidemia   -Holding zetia     # hypothyroidism  -Continue levothyroxine     # Chronic back and left knee pain  -Continue  "gabapentin        #Elevated troponin: Suspect demand ischemia  -- Initial troponin was 47 which increased to 100 and peaked at 104.  Patient was started on heparin infusion.  Subsequent troponin was 93.  Echocardiogram obtained which showed concurrent abnormalities.  I will discontinue heparin infusion, continue to monitor on telemetry.  EKG was sinus        VTE Prophylaxis: DOAC  Code Status: Full Code  Diet: Regular Diet Adult    Ron Catheter: Not present          Plan for today:  Stop heparin  Start sliding scale insulin  Monitor on telemetry  Follow cultures      Anticipated Disposition : Home       Medically Ready for Discharge: Anticipated Tomorrow or next day  : Pending clinical progress          Interval History (Subjective):        Patient is seen and examined by me today and medical record reviewed.Overnight events noted and care discussed with nursing staff.  Patient care assumed by me this morning.  Patient denies any chest pain.  Denies any shortness of breath, nausea or vomiting.  She admits to having a respiratory illness over the last few days.  Care plan discussed with bedside nurse.  She was intermittently hypotensive.                        Physical Exam:        Last Vital Signs:  BP 93/50   Pulse 70   Temp 99  F (37.2  C) (Oral)   Resp 18   Ht 1.702 m (5' 7\")   Wt 79.4 kg (175 lb)   LMP 06/01/1988 (Approximate)   SpO2 98%   BMI 27.41 kg/m      No intake/output data recorded.    Wt Readings from Last 5 Encounters:   01/03/25 79.4 kg (175 lb)   01/02/25 79.4 kg (175 lb)   12/10/24 79.4 kg (175 lb)   12/02/24 80.7 kg (178 lb)   10/17/24 81.2 kg (179 lb)        Constitutional: Awake, alert, cooperative, no apparent distress     Respiratory: Clear to auscultation bilaterally, no crackles or wheezing   Cardiovascular: Regular rate and rhythm, normal S1 and S2, and no murmur noted   Abdomen: Normal bowel sounds, soft, non-distended, non-tender   Skin: No new rashes, no cyanosis, dry to touch " "  Neuro: Alert with  no new focal weakness   Extremities: No edema   Other(s):        All other systems: Negative          Medications:        All current medications were reviewed with changes reflected in problem list.         Data:      All new lab and imaging data was reviewed.      Data reviewed today: I reviewed all new labs and imaging results over the last 24 hours. I personally reviewed       Recent Labs   Lab 25  0742 25  1733   WBC 7.7 9.2   HGB 9.0* 10.2*   HCT 28.7* 32.2*   MCV 89 89    418     No results for input(s): \"CULT\" in the last 168 hours.  Recent Labs   Lab 25  0742 25  1733    139   POTASSIUM 4.3 3.5   CHLORIDE 108* 103   CO2 17* 18*   ANIONGAP 13 18*   * 112*   BUN 39.3* 45.5*   CR 1.68* 1.77*   GFRESTIMATED 31* 29*   KEILY 8.5* 9.2   PROTTOTAL  --  6.3*   ALBUMIN  --  3.5   BILITOTAL  --  0.4   ALKPHOS  --  108   AST  --  22   ALT  --  26       Recent Labs   Lab 25  0742 25  1733   * 112*       Recent Labs   Lab 25  1733   INR 1.23*         No results for input(s): \"TROPONIN\", \"TROPI\", \"TROPR\" in the last 168 hours.    Invalid input(s): \"TROP\", \"TROPONINIES\"    Recent Results (from the past 48 hours)   Chest XR,  PA & LAT    Narrative    EXAM: XR CHEST 2 VIEWS  LOCATION: [facility]  DATE: 1/3/2025    INDICATION: Cough, fever  COMPARISON: 2024.      Impression    IMPRESSION: Cardiac silhouette is within normal limits. No focal airspace consolidation. Trace left pleural effusion. No discernible pneumothorax. Left chest loop recorder.   Echocardiogram Complete   Result Value    LVEF  60-65%    Narrative    [id number]  [id number]  [id number]  [id number]^MARY JO^CARMEN^[facility]  Echocardiography Laboratory  [address] [place] [address]     Name: ISA CAMARGO  MRN: [id number]  : 1950  Study Date: 2025 07:12 AM  Age: 74 yrs  Gender: " Female  Patient Location: [facility]  Reason For Study: MI  Ordering Physician: CARMEN CAMARGO  Referring Physician: Daniel Hiadlgo  Performed By: Maddi Cho     BSA: 1.9 m2  Height: 67 in  Weight: 175 lb  HR: 79  BP: 97/47 mmHg  ______________________________________________________________________________  Procedure  Echocardiogram with two-dimensional, color and spectral Doppler. Technically  difficult study.Extremely poor acoustic windows.  ______________________________________________________________________________  Interpretation Summary     Technically difficult study with poor acoustic windows. Patient refused echo  contrast and terminated study early.     1. LV appears normal in size with mild concentric LVH. LVEF is normal with  LVEF 60-65%. No obvious wall motion abnormalities.  2. Normal RV size and systolic function.  3. LA is normal size. RA not well visualized.  4. Valvular anatomy is not well seen due to poor 2D image quality, though  there does appear to be thickening and calcification of both the aortic and  mitral valve leaflets. No significant valvular stenosis or regurgitation,  however, on Doppler assessment.  5. No pericardial effusion noted on limited windows.     Prior TTE from 5/18/2018 showed normal LVEF with 1-2+ MR.  ______________________________________________________________________________  Left Ventricle  The left ventricle is normal in size. There is mild concentric left  ventricular hypertrophy. Left ventricular systolic function is normal. The  visual ejection fraction is 60-65%. Left ventricular diastolic function is  indeterminate. No regional wall motion abnormalities noted.     Right Ventricle  The right ventricle is normal size. The right ventricular systolic function is  normal.     Atria  Normal left atrial size. Right atrium not well visualized.     Mitral Valve  Mitral valve leaflets are thickened and calcified with restricted motion.  There is mild mitral  annular calcification. There is trace mitral  regurgitation. The mean mitral valve gradient is 3.2 mmHg.     Tricuspid Valve  The tricuspid valve is not well visualized. There is trace tricuspid  regurgitation. Right ventricular systolic pressure could not be approximated  due to inadequate tricuspid regurgitation.     Aortic Valve  Aortic valve is not well visualized but probably trileaflet, with moderate  sclerosis of the LCC and NCC. There is trace to mild aortic regurgitation. No  aortic stenosis is present.     Pulmonic Valve  The pulmonic valve is not well visualized.     Vessels  The aortic root is normal size. Normal size ascending aorta.     Pericardium  The pericardium is not well visualized. There is no pericardial effusion.     ______________________________________________________________________________  MMode/2D Measurements & Calculations  Ao root diam: 3.0 cm  asc Aorta Diam: 3.5 cm  LVOT diam: 1.9 cm  LVOT area: 3.0 cm2  Ao root diam index Ht(cm/m): 1.8     Ao root diam index BSA (cm/m2): 1.6  Asc Ao diam index BSA (cm/m2): 1.9  Asc Ao diam index Ht(cm/m): 2.1  LA Volume (BP): 48.3 ml  LA Volume Index (BP): 25.3 ml/m2  TAPSE: 2.5 cm     Doppler Measurements & Calculations  MV E max nate: 78.2 cm/sec  MV A max nate: 99.2 cm/sec  MV E/A: 0.79  MV max P.6 mmHg  MV mean PG: 3.2 mmHg  MV V2 VTI: 32.2 cm  MVA(VTI): 2.3 cm2     MV dec slope: 325.6 cm/sec2  MV dec time: 0.24 sec  Ao V2 max: 191.5 cm/sec  Ao max PG: 15.0 mmHg  Ao V2 mean: 131.5 cm/sec  Ao mean P.0 mmHg  Ao V2 VTI: 33.8 cm  MAYUR(I,D): 2.2 cm2  MAYUR(V,D): 2.2 cm2  LV V1 max P.6 mmHg  LV V1 max: 138.0 cm/sec  LV V1 VTI: 25.2 cm  SV(LVOT): 75.2 ml  SI(LVOT): 39.4 ml/m2  PA V2 max: 118.1 cm/sec  PA max P.6 mmHg  PA acc time: 0.09 sec  AV Nate Ratio (DI): 0.72  MAYUR Index (cm2/m2): 1.2  E/E' avg: 15.0  Lateral E/e': 13.3  Medial E/e': 16.7  RV S Nate: 13.2 cm/sec      ______________________________________________________________________________  Report approved by: MD Mauricio Villar on 01/04/2025 11:46 AM             COVID Status:  COVID-19 PCR Results          9/22/2024    01:51 12/10/2024    21:08 1/3/2025    17:46   COVID-19 PCR Results   SARS CoV2 PCR Negative  Negative  Negative      COVID-19 Antibody Results, Testing for Immunity           No data to display                 Disclaimer: This note consists of symbols derived from keyboarding, dictation and/or voice recognition software. As a result, there may be errors in the script that have gone undetected. Please consider this when interpreting information found in this chart.

## 2025-01-04 NOTE — ED NOTES
[facility]  ED Nurse Handoff Report    ED Chief complaint: Hypotension  . ED Diagnosis:   Final diagnoses:   Fever in adult   Immunosuppressed status (H)   Transient hypotension       Allergies:   Allergies   Allergen Reactions    Fluconazole Hives and Itching     Full body hives    [See intradermal skin testing results from 8/2/2019]    Mycophenolate Diarrhea and Nausea and Vomiting     Patient stated it was chronic and lasted months      Penicillins Anaphylaxis, Hives, Itching and Rash     [See intradermal skin testing results from 8/2/2019]      Simvastatin Muscle Pain (Myalgia)     severe  Other reaction(s): Myalgia caused by statin    Methotrexate Other (See Comments)     Other reaction(s): Sore  Sores in mouth, esophagus, and stomach.       Morphine And Codeine Itching and Other (See Comments)     Psych disturbance  Other reaction(s): Confusion, Mood alteration    Quinolones Anxiety, Dizziness, Headache, Other (See Comments), Palpitations and Unknown     Other reaction(s): Hyperactive behavior, Lightheadedness, Mood alteration    Dizzy, light headed    Dizziness, shaky, and jumpy    Benadryl [Diphenhydramine Hcl]      Insomnia     Capsules, Empty Gelatin [Gelatin]     Lansoprazole Diarrhea    Azithromycin Itching     [See intradermal skin testing results from 8/2/2019]    Bactrim [Sulfamethoxazole-Trimethoprim] Other (See Comments)     Numb mouth, tingling lips (treated with anti-histamines)    Cephalosporins Itching     [See intradermal skin testing results from 8/2/2019]    Ciprofloxacin Hcl Other (See Comments) and Dizziness     Insomnia, mood lability, Irregular heart beat         Doxycycline Itching and Unknown     [See intradermal skin testing results from 8/2/2019]    Lisinopril Cough    Omeprazole Itching    Tolectin [Nsaids] Rash    Tolmetin Rash and Itching    Tramadol Rash, Hives and Itching       Code Status: Full Code    Activity level - Baseline/Home:  assist of  1.  Activity Level - Current:   assist of 2.   Lift room needed: No.   Bariatric: No   Needed: No   Isolation: No.   Infection: Not Applicable.     Respiratory status: Room air    Vital Signs (within 30 minutes):   Vitals:    01/03/25 1845 01/03/25 1847 01/03/25 1950 01/03/25 2030   BP: 134/67  (!) 177/82    Pulse: 82   85   Resp:  14  13   Temp:       TempSrc:       SpO2: 94%  95%    Weight:       Height:           Cardiac Rhythm:  ,      Pain level:    Patient confused: No.   Patient Falls Risk: nonskid shoes/slippers when out of bed, patient and family education, and assistive device/personal items within reach.   Elimination Status: Has voided     Patient Report - Initial Complaint: hypotension.   Luz Thompson is a 74 year old female with history of CKD, DVT, anemia of chronic disease and type 2 diabetes who presents to the ED via EMS for evaluation of hypotension. Patient reports that she had low grade fever for about week. Today she come to ED for elevated fever of 101, which concerned her more since she is immunosuppressed. She endorse shortness of breath with chest pain, dizziness, feeling like passing out, and rhinorrhea. She went to her primary care doctor yesterday with same concerns and she was waiting to get lab work today. She took Tylenol around 12 PM today. Last night patient had loose stools. Patient reports taking Eliquis which she did not take today. In the past patient was asymptomatic when she had UTI. Until today she had good oral intake. Denies productive cough, abdominal pain, vomiting, diarrhea, urinary symptoms, catheter at home, or new skin rash.   Focused Assessment:     pt comes in from home with fever ov 101 post tylenol, general malase and UTI symptoms. pt has a hx of UTI's. pt able to stand and pivot from EMS cot to Wheelchair without difficulty. pt daughter. has arrived. VSS for EMS.       Abnormal Results:   Labs Ordered and Resulted from Time of ED Arrival to Time  of ED Departure   BASIC METABOLIC PANEL - Abnormal       Result Value    Sodium 139      Potassium 3.5      Chloride 103      Carbon Dioxide (CO2) 18 (*)     Anion Gap 18 (*)     Urea Nitrogen 45.5 (*)     Creatinine 1.77 (*)     GFR Estimate 29 (*)     Calcium 9.2      Glucose 112 (*)    HEPATIC FUNCTION PANEL - Abnormal    Protein Total 6.3 (*)     Albumin 3.5      Bilirubin Total 0.4      Alkaline Phosphatase 108      AST 22      ALT 26      Bilirubin Direct <0.20     INR - Abnormal    INR 1.23 (*)    TROPONIN T, HIGH SENSITIVITY - Abnormal    Troponin T, High Sensitivity 47 (*)    ROUTINE UA WITH MICROSCOPIC REFLEX TO CULTURE - Abnormal    Color Urine Light Yellow      Appearance Urine Clear      Glucose Urine Negative      Bilirubin Urine Negative      Ketones Urine Negative      Specific Gravity Urine 1.012      Blood Urine Negative      pH Urine 5.5      Protein Albumin Urine 10 (*)     Urobilinogen Urine Normal      Nitrite Urine Negative      Leukocyte Esterase Urine Trace (*)     Bacteria Urine Few (*)     Mucus Urine Present (*)     RBC Urine 1      WBC Urine <1      Squamous Epithelials Urine 2 (*)    CBC WITH PLATELETS AND DIFFERENTIAL - Abnormal    WBC Count 9.2      RBC Count 3.64 (*)     Hemoglobin 10.2 (*)     Hematocrit 32.2 (*)     MCV 89      MCH 28.0      MCHC 31.7      RDW 15.8 (*)     Platelet Count 418     ISTAT GASES LACTATE VENOUS POCT - Abnormal    Lactic Acid POCT 1.3      Bicarbonate Venous POCT 20 (*)     O2 Sat, Venous POCT 82 (*)     pCO2 Venous POCT 28 (*)     pH Venous POCT 7.47 (*)     pO2 Venous POCT 42     MANUAL DIFFERENTIAL - Abnormal    % Neutrophils 75      % Lymphocytes 11      % Monocytes 11      % Eosinophils 0      % Basophils 3      Absolute Neutrophils 6.9      Absolute Lymphocytes 1.0      Absolute Monocytes 1.0      Absolute Eosinophils 0.0      Absolute Basophils 0.3 (*)     RBC Morphology Confirmed RBC Indices      Platelet Assessment        Value: Automated Count  Confirmed. Platelet morphology is normal.   LIPASE - Normal    Lipase 29     AMMONIA - Normal    Ammonia 20     PROCALCITONIN - Normal    Procalcitonin 0.15     INFLUENZA A/B, RSV AND SARS-COV2 PCR - Normal    Influenza A PCR Negative      Influenza B PCR Negative      RSV PCR Negative      SARS CoV2 PCR Negative     TROPONIN T, HIGH SENSITIVITY   BLOOD CULTURE   BLOOD CULTURE        Chest XR,  PA & LAT   Final Result   IMPRESSION: Cardiac silhouette is within normal limits. No focal airspace consolidation. Trace left pleural effusion. No discernible pneumothorax. Left chest loop recorder.          Treatments provided: labs,imaging, see MAR  Family Comments: n/a  OBS brochure/video discussed/provided to patient:  N/A  ED Medications:   Medications   sodium chloride 0.9% BOLUS 1,848 mL (0 mLs Intravenous Stopped 1/3/25 2019)       Drips infusing:  No  For the majority of the shift this patient was Green.   Interventions performed were n/a.    Sepsis treatment initiated: No    Cares/treatment/interventions/medications to be completed following ED care: n/a    ED Nurse Name: Martine Rivera RN  8:55 PM    RECEIVING UNIT ED HANDOFF REVIEW    Above ED Nurse Handoff Report was reviewed: Yes  Reviewed by: Mary Anne Moss RN on January 4, 2025 at 3:37 PM   ALAN Soni called the ED to inform them the note was read: No

## 2025-01-04 NOTE — ED NOTES
"When RN informed pt that she needed a straight cath the pt expresses understanding; released pt for imaging first as she stated she did not need to urinate. She stated she wanted the purewick on after the straight cath and RN agreed to this. When transport arrived to take pt to imaging she reportedly told transport she needed to urinate. RN went to room to perform straight catheterization and place purewick. As RN was attempting to catheterize pt she began crying and screaming; stating \"Please lord, please help her\". RN asked if pt was in pain and she states she is in some pain but was mostly scared. Pt was tearful, stating \"Just let me pee, use the purewick instead.\" As RN was still attempting to catheterize she refuses. RN let pt know there may need to be two people to catheterize but she refuses this while crying and asks for a purewick. RN placed purewick d/t pt's urgency.   "

## 2025-01-04 NOTE — H&P
[facility]    History and Physical - Hospitalist Service       Date of Admission:  1/3/2025    Assessment & Plan      Luz Thompson is a medically complex 74 year old female with history of primary biliary cirrhosis s/p liver transplant in 2002 on chronic immunosuppression, history of atrial fibrillation on anticoagulation, previous stroke, CKD stage III, frequent UTIs, hypertension, history of CVA, type 2 diabetes, chronic back and left knee pain, hypothyroidism and hyperlipidemia admitted on 1/3/2025 with fever and generalized weakness.    In the ED temp 99, pulse 64, pressure initially 65/36 but improved to 193/96 after IV fluids and breathing comfortably on room air without hypoxia.  Lab work shows a creatinine of 1.77 which is near baseline, BUN 45.5, elevated anion gap of 18, normal LFTs, glucose of 112, procalcitonin 0.15, lactic acid 1.3, high-sensitivity troponin of 47, WBC 9.2, hemoglobin 10.2 and platelet count 418.  Urinalysis did not appear infected, chest x-ray was clear, blood cultures x 2 were drawn and COVID/influenza/RSV were negative.  ED ordered almost 2 L of fluid and cefepime/vancomycin with observation admission.    Addendum: Patient's second troponin returned at 100. Patient did describe episode of SOB prior to arrival now resolved and had quite a swing in blood pressure from 65/36 to 193/96. No chest pain.   -Will plan to give Aspirin 81 mg  -Heparin drip started, hold Eliquis  -Echo ordered  -Monitor on tele  -Cardiology consult  -Already on betablocker  -Continue Zetia    # Fever of unknown origin  -Patient reports low-grade fever in the 99 range for most of the week with cold-like symptoms but had a fever up to 101 today without any other associated symptoms including cough, nausea, vomiting, abdominal pain, urinary symptoms or diarrhea.  She did feel short of breath earlier but that is since resolved.  -In the ED temp was 99 and she was initially hypotensive  at 65/36 but received a liter of IV fluids and is now 193/96  -WBC is normal, lactic acid is normal, procalcitonin is 0.15 arguing against bacterial infection, urinalysis does not appear infected, chest x-ray does not show pneumonia, COVID/influenza/RSV are negative, blood cultures x 2 were drawn.  She does not have a severe headache to suggest meningitis  -On exam she appears well without any complaints  -Initially ED ordered cefepime and vancomycin for broad spectrum coverage but I have discontinued these and will monitor overnight  -Possibly she has a respiratory virus?  -Further workup if fevers continue      # Dizziness with transient hypotension  -Patient had dizziness while in the waiting room with pressures as low as 65/36.  She has received 2 L of IV fluids and is not dizzy any longer with elevated pressures now. Creatinine was a little higher than baseline suggesting some degree of dehydration?  -Hold on further fluids  -Check orthostatics    # History of liver transplant in 2002 secondary to primary biliary cirrhosis  # Chronic immunosuppression  -Continue prednisone 10 mg daily, serial Jolley 1 mg daily and ursodiol 250 mg twice daily    # CKD stage 2III   -Patient has labile creatinines ranging from 1.5-1.9 with creatinine of 1.77 in the ED  -Likely some degree of dehydration with continued fevers all week  -Recheck BMP in a.m.      # Atrial fibrillation  # History of CVA  -Continue Eliquis  -Continue Toprol-XL    # Type 2 diabetes  -A1c was 6.9 on December 10  -Holding linagliptin    # Hypertension  -Patient requested to continue her hydralazine due to history of stroke. Will check orthostatics first    # Hyperlipidemia   -Holding zetia    # hypothyroidism  -Continue levothyroxine    # Chronic back and left knee pain  -Continue gabapentin              Diet:  Regular diet  DVT Prophylaxis: DOAC  Ron Catheter: Not present  Lines: None     Cardiac Monitoring: None  Code Status:  Full code    Clinically  "Significant Risk Factors Present on Admission                # Drug Induced Coagulation Defect: home medication list includes an anticoagulant medication    # Hypertension: Noted on problem list      # Anemia: based on hgb <11      # DMII: A1C = 6.9 % (Ref range: <5.7 %) within past 6 months    # Overweight: Estimated body mass index is 27.41 kg/m  as calculated from the following:    Height as of this encounter: 1.702 m (5' 7\").    Weight as of this encounter: 79.4 kg (175 lb).              Disposition Plan     Medically Ready for Discharge: Anticipated Tomorrow         The patient's care was discussed with the Patient and ED Consultant(s).  I offered to call family but she declined    Agustina Thompson PA-C  Hospitalist Service  [facility]  Securely message with [employer] (more info)  Text page via [facility] Paging/Directory     ______________________________________________________________________    Chief Complaint   Fever    History is obtained from the patient    History of Present Illness   Luz Thompson is a 74 year old female who presents to the hospital with a fever of 101.  She states she has had cold-like symptoms with temperatures in the 99 range all week.  She for her 99 range is a fever.  She denies nausea, vomiting, diarrhea, abdominal pain, urinary symptoms, headache, cough and chest pain.  She came in today because her temperature was 101 and was told to come to the hospital when she had a fever.  She felt short of breath earlier today but this is since resolved.  She other than the cold-like symptoms has not been sick recently.  She felt dizzy while she was sitting in the waiting room but that is since resolved.  She continues to take her medicines as prescribed without anything new.  She does not drink alcohol or smoke cigarettes.      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       Past Surgical History   Past Surgical History:   Procedure Laterality Date    APPENDECTOMY  1961    CATARACT IOL, RT/LT      RE12/19/2013, LE12/10/2013 - Toric lenses    CHOLECYSTECTOMY  1991    COLONOSCOPY  03/10/2014    Procedure: COLONOSCOPY;;  Surgeon: Gentry Ramirez MD;  Location:  GI    CYSTOSCOPY      ear drum repair      ENDOBRONCHIAL ULTRASOUND FLEXIBLE N/A 09/29/2017    Procedure: ENDOBRONCHIAL ULTRASOUND FLEXIBLE;  Flexible Bronchoscopy, Endobronchial Ultrasound, Transbronchial Needle Aspiration ;  Surgeon: Eden Clinton MD;  Location:  OR    ENDOSCOPIC RETROGRADE CHOLANGIOPANCREATOGRAM  09/19/2013    Procedure: ENDOSCOPIC RETROGRADE CHOLANGIOPANCREATOGRAM;  " Endoscopic Retrograde Cholangiopancreatogram with single balloon enteroscopy, ballon sweep of bile duct;  Surgeon: Brett Membreno MD;  Location: [facility] OR    HC KNEE SCOPE,MED/LAT MENISECTOMY Right 08/10/2012    partial medial menisectomy only    KNEE SURGERY  1966    R knee    PICC INSERTION  09/18/2013    4fr SL PASV PICC, 40cm (1cm external) in the R basilic vein w/ tip in the low SVC    PICC INSERTION  02/21/2014    5 fr DL BioFlo Navilyst PICC, 46 cm (3 cm external) in the L basilic vein w/ tip in the SVC RA junction.    THYROIDECTOMY  03/2010    TRANSPLANT LIVER RECIPIENT LIVING UNRELATED  05/2002       Prior to Admission Medications   Prior to Admission Medications   Prescriptions Last Dose Informant Patient Reported? Taking?   Continuous Glucose Sensor (FREESTYLE NINO 2 SENSOR) MISC   No No   Sig: Change every 14 days.   D-MANNOSE PO 1/2/2025 Evening  Yes Yes   Sig: Take 1 Scoop by mouth 2 times daily.   EPINEPHrine (ANY BX GENERIC EQUIV) 0.3 MG/0.3ML injection 2-pack Unknown  No Yes   Sig: Inject 0.3 mLs (0.3 mg) into the muscle as needed for anaphylaxis. May repeat one time in 5-15 minutes if response to initial dose is inadequate.   Ferrous Sulfate 324 MG TBEC Past Week  Yes Yes   Sig: Take 1 tablet by mouth daily.   Multiple Vitamins-Minerals (PRESERVISION AREDS 2) CAPS 1/2/2025 Evening  No Yes   Sig: Take 1 capsule by mouth 2 times daily   Nutrisource Fiber PO packet 1/2/2025 Morning  Yes Yes   Sig: Take 1 packet by mouth daily.   apixaban ANTICOAGULANT (ELIQUIS ANTICOAGULANT) 2.5 MG tablet 1/2/2025 Evening  No Yes   Sig: Take 1 tablet (2.5 mg) by mouth 2 times daily   calcitRIOL (ROCALTROL) 0.25 MCG capsule 1/2/2025 Morning  No Yes   Sig: Take 1 capsule (0.25 mcg) by mouth daily.   estradiol (ESTRACE) 0.1 MG/GM vaginal cream Past Week  Yes Yes   Sig: Place vaginally every other day.   evolocumab (REPATHA SURECLICK) 140 MG/ML prefilled autoinjector Past Month  No Yes   Sig: Inject 1 mL (140 mg)  subcutaneously every 14 days. . Please have fasting labs drawn for further refills.   ezetimibe (ZETIA) 10 MG tablet 1/2/2025 Evening  No Yes   Sig: Take 1 tablet (10 mg) by mouth daily.   folic acid (FOLVITE) 1 MG tablet 1/2/2025 Morning  No Yes   Sig: Take 1 tablet (1 mg) by mouth daily.   gabapentin (NEURONTIN) 250 MG/5ML solution Unknown  No Yes   Sig: Take 6 mLs (300 mg) by mouth at bedtime   glucose (BD GLUCOSE) 5 g chewable tablet Unknown  No Yes   Sig: Take 2 tablets (10 g) by mouth as needed (low blood sugar)   hydrALAZINE (APRESOLINE) 50 MG tablet 1/2/2025 Evening  No Yes   Sig: Take 1 tablet (50 mg) by mouth 3 times daily.   hypromellose (ARTIFICIAL TEARS) 0.4 % SOLN ophthalmic solution Unknown  No Yes   Sig: Apply 1 drop to eye every hour as needed for dry eyes   ketoconazole (NIZORAL) 2 % external cream 1/2/2025  No Yes   Sig: Apply topically 2 times daily as needed (redness and flaking). Apply to the face.   ketoconazole (NIZORAL) 2 % external shampoo 1/2/2025  No Yes   Sig: Apply topically three times a week. Lather in the shower, wait 3-5 minutes before rinsing.   levothyroxine (SYNTHROID/LEVOTHROID) 175 MCG tablet 1/2/2025 Morning  No Yes   Sig: Take 1 tablet (175 mcg) by mouth daily.   linagliptin (TRADJENTA) 5 MG TABS tablet 1/2/2025 Morning  No Yes   Sig: Take 1 tablet (5 mg) by mouth daily.   meclizine (ANTIVERT) 25 MG tablet Unknown  No Yes   Sig: Take 1 tablet (25 mg) by mouth as needed for dizziness or other (migraines)   metoprolol succinate ER (TOPROL XL) 25 MG 24 hr tablet 1/2/2025 Evening  Yes Yes   Sig: Take 1 tablet (25 mg) by mouth 2 times daily. Take an extra tablet daily as needed for breakthrough afib. May repeat in two hours if heart rate remains >100bpm (no more than 4 tablets per day).   omega-3 acid ethyl esters (LOVAZA) 1 g capsule 1/2/2025 Evening  No Yes   Sig: Take 1 capsule by mouth 2 times daily   predniSONE (DELTASONE) 10 MG tablet 1/2/2025 Morning  No Yes   Sig: Take 1  tablet (10 mg) by mouth daily.   sirolimus (GENERIC EQUIVALENT) 1 MG tablet 1/2/2025 Morning  No Yes   Sig: Take 1 tablet (1 mg) by mouth daily.   ursodiol (ACTIGALL) 250 MG tablet 1/2/2025 Evening  No Yes   Sig: Take 1 tablet (250 mg) by mouth 2 times daily.      Facility-Administered Medications: None          Physical Exam   Vital Signs: Temp: 99  F (37.2  C) Temp src: Oral BP: (!) 193/96 Pulse: 85   Resp: 13 SpO2: 92 % O2 Device: None (Room air)    Weight: 175 lbs 0 oz    General Appearance: Alert and oriented x 3  Respiratory: Clear to auscultation bilaterally  Cardiovascular: RRR without murmur  GI: Bowel sounds are present without tenderness  Skin: No rashes or open sores are noted  Other: Trace bilateral edema    Medical Decision Making       55 MINUTES SPENT BY ME on the date of service doing chart review, history, exam, documentation & further activities per the note.      Data     I have personally reviewed the following data over the past 24 hrs:    9.2  \   10.2 (L)   / 418     139 103 45.5 (H) /  112 (H)   3.5 18 (L) 1.77 (H) \     ALT: 26 AST: 22 AP: 108 TBILI: 0.4   ALB: 3.5 TOT PROTEIN: 6.3 (L) LIPASE: 29     Trop: 47 (H) BNP: N/A     Procal: 0.15 CRP: N/A Lactic Acid: 1.3       INR:  1.23 (H) PTT:  N/A   D-dimer:  N/A Fibrinogen:  N/A       Imaging results reviewed over the past 24 hrs:   Recent Results (from the past 24 hours)   Chest XR,  PA & LAT    Narrative    EXAM: XR CHEST 2 VIEWS  LOCATION: [facility]  DATE: 1/3/2025    INDICATION: Cough, fever  COMPARISON: 09/22/2024.      Impression    IMPRESSION: Cardiac silhouette is within normal limits. No focal airspace consolidation. Trace left pleural effusion. No discernible pneumothorax. Left chest loop recorder.

## 2025-01-04 NOTE — PHARMACY-ADMISSION MEDICATION HISTORY
Pharmacy Intern Admission Medication History    Admission medication history is complete. The information provided in this note is only as accurate as the sources available at the time of the update.    Information Source(s): Patient and CareEverywhere/SureScripts via in-person    Pertinent Information: Patient did not take any of her medications today.     Changes made to PTA medication list:  Added: None  Deleted: Azelastine Spray  Changed: None    Allergies reviewed with patient and updates made in EHR: no    Medication History Completed By: Blas Martinez 1/3/2025 7:00 PM    PTA Med List   Medication Sig Last Dose/Taking    apixaban ANTICOAGULANT (ELIQUIS ANTICOAGULANT) 2.5 MG tablet Take 1 tablet (2.5 mg) by mouth 2 times daily 1/2/2025 Evening    calcitRIOL (ROCALTROL) 0.25 MCG capsule Take 1 capsule (0.25 mcg) by mouth daily. 1/2/2025 Morning    D-MANNOSE PO Take 1 Scoop by mouth 2 times daily. 1/2/2025 Evening    EPINEPHrine (ANY BX GENERIC EQUIV) 0.3 MG/0.3ML injection 2-pack Inject 0.3 mLs (0.3 mg) into the muscle as needed for anaphylaxis. May repeat one time in 5-15 minutes if response to initial dose is inadequate. Unknown    estradiol (ESTRACE) 0.1 MG/GM vaginal cream Place vaginally every other day. Past Week    evolocumab (REPATHA SURECLICK) 140 MG/ML prefilled autoinjector Inject 1 mL (140 mg) subcutaneously every 14 days. . Please have fasting labs drawn for further refills. Past Month    ezetimibe (ZETIA) 10 MG tablet Take 1 tablet (10 mg) by mouth daily. 1/2/2025 Evening    Ferrous Sulfate 324 MG TBEC Take 1 tablet by mouth daily. Past Week    folic acid (FOLVITE) 1 MG tablet Take 1 tablet (1 mg) by mouth daily. 1/2/2025 Morning    gabapentin (NEURONTIN) 250 MG/5ML solution Take 6 mLs (300 mg) by mouth at bedtime Unknown    glucose (BD GLUCOSE) 5 g chewable tablet Take 2 tablets (10 g) by mouth as needed (low blood sugar) Unknown    hydrALAZINE (APRESOLINE) 50 MG tablet Take 1 tablet (50 mg) by  mouth 3 times daily. 1/2/2025 Evening    hypromellose (ARTIFICIAL TEARS) 0.4 % SOLN ophthalmic solution Apply 1 drop to eye every hour as needed for dry eyes Unknown    ketoconazole (NIZORAL) 2 % external cream Apply topically 2 times daily as needed (redness and flaking). Apply to the face. 1/2/2025    ketoconazole (NIZORAL) 2 % external shampoo Apply topically three times a week. Lather in the shower, wait 3-5 minutes before rinsing. 1/2/2025    levothyroxine (SYNTHROID/LEVOTHROID) 175 MCG tablet Take 1 tablet (175 mcg) by mouth daily. 1/2/2025 Morning    linagliptin (TRADJENTA) 5 MG TABS tablet Take 1 tablet (5 mg) by mouth daily. 1/2/2025 Morning    meclizine (ANTIVERT) 25 MG tablet Take 1 tablet (25 mg) by mouth as needed for dizziness or other (migraines) Unknown    metoprolol succinate ER (TOPROL XL) 25 MG 24 hr tablet Take 1 tablet (25 mg) by mouth 2 times daily. Take an extra tablet daily as needed for breakthrough afib. May repeat in two hours if heart rate remains >100bpm (no more than 4 tablets per day). 1/2/2025 Evening    Multiple Vitamins-Minerals (PRESERVISION AREDS 2) CAPS Take 1 capsule by mouth 2 times daily 1/2/2025 Evening    Nutrisource Fiber PO packet Take 1 packet by mouth daily. 1/2/2025 Morning    omega-3 acid ethyl esters (LOVAZA) 1 g capsule Take 1 capsule by mouth 2 times daily 1/2/2025 Evening    predniSONE (DELTASONE) 10 MG tablet Take 1 tablet (10 mg) by mouth daily. 1/2/2025 Morning    sirolimus (GENERIC EQUIVALENT) 1 MG tablet Take 1 tablet (1 mg) by mouth daily. 1/2/2025 Morning    ursodiol (ACTIGALL) 250 MG tablet Take 1 tablet (250 mg) by mouth 2 times daily. 1/2/2025 Evening

## 2025-01-04 NOTE — ED NOTES
Pt. Pivoted to commode with little assistance. Pt. Did not complain of dizziness or lightheadedness and denied spots in vision. Call light in reach.

## 2025-01-05 VITALS
WEIGHT: 183 LBS | HEART RATE: 76 BPM | SYSTOLIC BLOOD PRESSURE: 126 MMHG | RESPIRATION RATE: 20 BRPM | OXYGEN SATURATION: 97 % | HEIGHT: 67 IN | BODY MASS INDEX: 28.72 KG/M2 | TEMPERATURE: 97.6 F | DIASTOLIC BLOOD PRESSURE: 59 MMHG

## 2025-01-05 LAB
ANION GAP SERPL CALCULATED.3IONS-SCNC: 12 MMOL/L (ref 7–15)
BUN SERPL-MCNC: 47.5 MG/DL (ref 8–23)
CALCIUM SERPL-MCNC: 8.5 MG/DL (ref 8.8–10.4)
CHLORIDE SERPL-SCNC: 109 MMOL/L (ref 98–107)
CREAT SERPL-MCNC: 1.81 MG/DL (ref 0.51–0.95)
EGFRCR SERPLBLD CKD-EPI 2021: 29 ML/MIN/1.73M2
ERYTHROCYTE [DISTWIDTH] IN BLOOD BY AUTOMATED COUNT: 15.9 % (ref 10–15)
GLUCOSE BLDC GLUCOMTR-MCNC: 143 MG/DL (ref 70–99)
GLUCOSE BLDC GLUCOMTR-MCNC: 170 MG/DL (ref 70–99)
GLUCOSE SERPL-MCNC: 145 MG/DL (ref 70–99)
HCO3 SERPL-SCNC: 20 MMOL/L (ref 22–29)
HCT VFR BLD AUTO: 26.7 % (ref 35–47)
HGB BLD-MCNC: 8.2 G/DL (ref 11.7–15.7)
MCH RBC QN AUTO: 27.9 PG (ref 26.5–33)
MCHC RBC AUTO-ENTMCNC: 30.7 G/DL (ref 31.5–36.5)
MCV RBC AUTO: 91 FL (ref 78–100)
PLATELET # BLD AUTO: 343 10E3/UL (ref 150–450)
POTASSIUM SERPL-SCNC: 3.8 MMOL/L (ref 3.4–5.3)
RBC # BLD AUTO: 2.94 10E6/UL (ref 3.8–5.2)
SODIUM SERPL-SCNC: 141 MMOL/L (ref 135–145)
WBC # BLD AUTO: 6.3 10E3/UL (ref 4–11)

## 2025-01-05 PROCEDURE — 250N000013 HC RX MED GY IP 250 OP 250 PS 637: Performed by: PHYSICIAN ASSISTANT

## 2025-01-05 PROCEDURE — 80048 BASIC METABOLIC PNL TOTAL CA: CPT | Performed by: INTERNAL MEDICINE

## 2025-01-05 PROCEDURE — 85014 HEMATOCRIT: CPT | Performed by: INTERNAL MEDICINE

## 2025-01-05 PROCEDURE — 36415 COLL VENOUS BLD VENIPUNCTURE: CPT | Performed by: INTERNAL MEDICINE

## 2025-01-05 PROCEDURE — 82310 ASSAY OF CALCIUM: CPT | Performed by: INTERNAL MEDICINE

## 2025-01-05 PROCEDURE — 99239 HOSP IP/OBS DSCHRG MGMT >30: CPT | Performed by: INTERNAL MEDICINE

## 2025-01-05 PROCEDURE — 250N000012 HC RX MED GY IP 250 OP 636 PS 637: Performed by: PHYSICIAN ASSISTANT

## 2025-01-05 PROCEDURE — 250N000013 HC RX MED GY IP 250 OP 250 PS 637: Performed by: INTERNAL MEDICINE

## 2025-01-05 RX ORDER — AMLODIPINE BESYLATE 5 MG/1
2.5 TABLET ORAL DAILY
Qty: 30 TABLET | Refills: 0 | Status: SHIPPED | OUTPATIENT
Start: 2025-01-05

## 2025-01-05 RX ORDER — METOPROLOL SUCCINATE 25 MG/1
25 TABLET, EXTENDED RELEASE ORAL DAILY
COMMUNITY
Start: 2025-01-05

## 2025-01-05 RX ADMIN — PREDNISONE 10 MG: 10 TABLET ORAL at 09:58

## 2025-01-05 RX ADMIN — FOLIC ACID 1 MG: 1 TABLET ORAL at 09:58

## 2025-01-05 RX ADMIN — URSODIOL 250 MG: 250 TABLET ORAL at 09:59

## 2025-01-05 RX ADMIN — SIROLIMUS 1 MG: 0.5 TABLET ORAL at 09:58

## 2025-01-05 RX ADMIN — LEVOTHYROXINE SODIUM 175 MCG: 0.12 TABLET ORAL at 06:35

## 2025-01-05 RX ADMIN — OMEGA-3-ACID ETHYL ESTERS 1 CAPSULE: 1 CAPSULE, LIQUID FILLED ORAL at 10:00

## 2025-01-05 RX ADMIN — ASPIRIN 81 MG: 81 TABLET, COATED ORAL at 09:58

## 2025-01-05 RX ADMIN — APIXABAN 2.5 MG: 2.5 TABLET, FILM COATED ORAL at 09:58

## 2025-01-05 RX ADMIN — Medication 1 PACKET: at 08:16

## 2025-01-05 RX ADMIN — NYSTATIN: 100000 CREAM TOPICAL at 10:03

## 2025-01-05 RX ADMIN — METOPROLOL SUCCINATE 25 MG: 25 TABLET, EXTENDED RELEASE ORAL at 09:58

## 2025-01-05 RX ADMIN — CALCITRIOL CAPSULES 0.25 MCG 0.25 MCG: 0.25 CAPSULE ORAL at 09:58

## 2025-01-05 ASSESSMENT — ACTIVITIES OF DAILY LIVING (ADL)
ADLS_ACUITY_SCORE: 54

## 2025-01-05 NOTE — DISCHARGE SUMMARY
Discharge Note    Patient discharged to [facility]isted Living via private vehicle  accompanied by daughter.  IV: Discontinued  Prescriptions filled and given to patient/family.   Belongings reviewed and sent with patient.   Home medications returned to patient: Yes  Equipment sent with: Cane returned to Pt.   patient verbalizes understanding of discharge instructions. AVS given to patient.  Additional education completed?  Education given regarding new medication. All questions answered at time of discharge.

## 2025-01-05 NOTE — PLAN OF CARE
Goal Outcome Evaluation:      Plan of Care Reviewed With: patient    Overall Patient Progress: improvingOverall Patient Progress: improving    Outcome Evaluation: A&O x 4. VSS. BP  and temperature improved. On room air. . Tele SR      Problem: Adult Inpatient Plan of Care  Goal: Plan of Care Review  Description: The Plan of Care Review/Shift note should be completed every shift.  The Outcome Evaluation is a brief statement about your assessment that the patient is improving, declining, or no change.  This information will be displayed automatically on your shift  note.  Recent Flowsheet Documentation  Taken 1/4/2025 2217 by Carly Kowalski RN  Outcome Evaluation: A&O x 4. VSS. BP  and temperature improved. On room air. . Tele SR  Plan of Care Reviewed With: patient  Overall Patient Progress: improving  Goal: Absence of Hospital-Acquired Illness or Injury  Intervention: Identify and Manage Fall Risk  Recent Flowsheet Documentation  Taken 1/4/2025 2013 by Carly Kowalski RN  Safety Promotion/Fall Prevention:   activity supervised   clutter free environment maintained   nonskid shoes/slippers when out of bed   safety round/check completed  Intervention: Prevent Skin Injury  Recent Flowsheet Documentation  Taken 1/4/2025 2013 by Carly Kowalski RN  Body Position: position changed independently  Intervention: Prevent and Manage VTE (Venous Thromboembolism) Risk  Recent Flowsheet Documentation  Taken 1/4/2025 2013 by Calry Kowalski RN  VTE Prevention/Management: SCDs off (sequential compression devices)  Intervention: Prevent Infection  Recent Flowsheet Documentation  Taken 1/4/2025 2013 by Carly Kowalski RN  Infection Prevention:   hand hygiene promoted   personal protective equipment utilized   single patient room provided

## 2025-01-05 NOTE — PROGRESS NOTES
01/04/25 1603   Skin   Skin WDL X;color;integrity   Skin Color pale   Skin Integrity bruised (ecchymotic);rash;petechiae;other (see comments)   Bruised (ecchymotic) location Right;Left;Upper Arm   Rash Abdominal  (under abd folds and groin.)   Other (see comments) ble indentations from crossing legs per pt   Device Skin Interventions Taken No adjustments needed     Admission/Transfer from: ED  2 RN skin assessment completed. Yes  Significant findings include: Some bruising, abd fold moisture rash, ble indentations  LDA added (if applicable)? NO  Requested WOC Nurse Consult from MD? Not Applicable

## 2025-01-05 NOTE — DISCHARGE SUMMARY
"    [facility]  Discharge Summary  Hospitalist      Date of Admission:  1/3/2025  Date of Discharge:  1/5/2025  Provider:  Ramu Dover MD. [facility]  Date of Service (when I last saw the patient): 01/05/25      Primary Provider: Daniel Hidalgo          Discharge Diagnosis:     Discharge Diagnoses   Acute febrile illness, likely viral upper respiratory infection  Dizziness with transient hypotension, likely dehydration-related  Chronic kidney disease, stage III  NSTEMI, likely type 2 myocardial infarction (demand ischemia)  Atrial fibrillation  Type 2 diabetes mellitus  Hypertension, currently stabilized  Liver transplant on chronic immunosuppression  Chronic pain (back and knee)  Hypothyroidism    Other medical issues:  Past Medical History:   Diagnosis Date    Anemia of chronic disease 10/17/2011    Anxiety     CKD (chronic kidney disease) stage 3, GFR 30-59 ml/min (H) 04/04/2012    Coccidioidomycosis, 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     Hospital Course     Luz Thompson was admitted on 1/3/2025.  The following problems were addressed during her hospitalization:     74-year-old female with a significant past medical history, including primary biliary cirrhosis post-liver transplant (2002) on chronic immunosuppression, atrial fibrillation on anticoagulation, prior cerebrovascular accident (CVA), chronic kidney disease (CKD) stage III, and type 2 diabetes mellitus. She was admitted with fever and generalized weakness. Upon arrival at the emergency department, she was hypotensive but responded well to intravenous fluids and did not experience hypoxia. Laboratory tests indicated an elevated creatinine of 1.77, normal lactic acid, and a mildly elevated troponin peaking at 104, suggesting demand ischemia. The white blood cell count was normal, and imaging and infectious workups were negative for any identifiable source of infection.        Acute Febrile Illness of Unclear Etiology: Suspected viral upper respiratory infection; supportive care was continued while antibiotics were withheld as cultures were monitored.  Dizziness with Transient Hypotension: Likely dehydration-related, resolved with IV fluids. Hydralazine was discontinued due to potential orthostatic hypotension. Beta-blocker dosage was reduced due to transient bradycardia in HR 30's, and amlodipine 2.5 mg was added which can be titrated as needed per primary care and follow-up. The patient is on a loop monitor to assess the need for a pacemaker.    CKD Stage III: Creatinine stabilized between 1.7-1.8, attributed to dehydration.  NSTEMI - Likely Type 2 MI (Demand Ischemia): Troponin levels peaked at 104; heparin was discontinued. Telemetry monitoring  continued. Outpatient Lexiscan MPI recommended by cardiology, with further stress testing deferred to the primary care physician.  Atrial Fibrillation: Continued on Eliquis and Toprol-XL; patient currently in normal sinus rhythm.  Type 2 Diabetes: Managed with sliding scale insulin; linagliptin held.  Liver Transplant with Chronic Immunosuppression: Continued on prednisone, tacrolimus, and ursodiol.  Chronic Pain and Other Conditions: Gabapentin continued for chronic back and knee pain; levothyroxine for hypothyroidism; Zetia for hyperlipidemia.    The patient is advised to follow up with her primary care physician within one week for further management and evaluation of potential repeat stress testing as well as HTN management.  She also needs to call cardiology EP clinic in [facility] tomorrow for the reading of her loop monitor and discussion if she needs a pacemaker which has been ongoing with the EP team at [facility] per patient      Pending Results   Unresulted Labs Ordered in the Past 30 Days of this Admission       Date and Time Order Name Status Description    1/3/2025  5:52 PM Blood Culture Arm, Left Preliminary     1/3/2025  4:58 PM Blood Culture Peripheral Blood Preliminary             Discharge Orders      Reason for your hospital stay    Orthostatic hypotension     Follow-up and recommended labs and tests     Follow-up with primary care physician in 2 to 3 days  Follow-up with cardiology [facility] EP team on loop monitor reading tomorrow     Activity    Your activity upon discharge: activity as tolerated     Diet    Follow this diet upon discharge: Current Diet:Orders Placed This Encounter      Regular Diet Adult       Code Status   Full Code       Primary Care Physician   Daniel Hidalgo    Physical Exam   Temp: 97.6  F (36.4  C) Temp src: Oral BP: 126/59 Pulse: 76   Resp: 20 SpO2: 97 % O2 Device: None (Room air) Oxygen Delivery: 2 LPM  Vitals:    01/03/25 1648 01/04/25 1554   Weight: 79.4 kg (175 lb) 83  kg (183 lb)     Vital Signs with Ranges  Temp:  [97.6  F (36.4  C)-98.2  F (36.8  C)] 97.6  F (36.4  C)  Pulse:  [58-85] 76  Resp:  [14-20] 20  BP: ()/(48-65) 126/59  SpO2:  [95 %-99 %] 97 %  I/O last 3 completed shifts:  In: 120 [P.O.:120]  Out: -     Constitutional:  alert, cooperative, no apparent distress  Respiratory: No increased work of breathing, good air exchange, no crackles or wheezing.  Cardiovascular: apical impulse,normal S1 and S2  GI: bowel sounds present, soft, non-distended, non-tender      Discharge Disposition   Discharged to home    Consultations This Hospital Stay   PHARMACY TO [facility]  PHARMACY IP CONSULT  CARDIOLOGY IP CONSULT    Time Spent on this Encounter   I, Ramu Dover MD, personally saw the patient today and spent greater than 30 minutes discharging this patient.      Discharge Medications   Current Discharge Medication List        START taking these medications    Details   amLODIPine (NORVASC) 5 MG tablet Take 0.5 tablets (2.5 mg) by mouth daily.  Qty: 30 tablet, Refills: 0    Associated Diagnoses: Transient hypotension; Paroxysmal atrial fibrillation (H)           CONTINUE these medications which have CHANGED    Details   metoprolol succinate ER (TOPROL XL) 25 MG 24 hr tablet Take 1 tablet (25 mg) by mouth daily. Take an extra tablet daily as needed for breakthrough afib. May repeat in two hours if heart rate remains >100bpm (no more than 4 tablets per day).    Associated Diagnoses: Paroxysmal atrial fibrillation (H)           CONTINUE these medications which have NOT CHANGED    Details   apixaban ANTICOAGULANT (ELIQUIS ANTICOAGULANT) 2.5 MG tablet Take 1 tablet (2.5 mg) by mouth 2 times daily  Qty: 180 tablet, Refills: 3    Associated Diagnoses: Paroxysmal atrial fibrillation (H)      calcitRIOL (ROCALTROL) 0.25 MCG capsule Take 1 capsule (0.25 mcg) by mouth daily.  Qty: 90 capsule, Refills: 1    Associated Diagnoses: Papillary thyroid carcinoma (H)      D-MANNOSE  PO Take 1 Scoop by mouth 2 times daily.      EPINEPHrine (ANY BX GENERIC EQUIV) 0.3 MG/0.3ML injection 2-pack Inject 0.3 mLs (0.3 mg) into the muscle as needed for anaphylaxis. May repeat one time in 5-15 minutes if response to initial dose is inadequate.  Qty: 2 each, Refills: 1    Associated Diagnoses: Anaphylaxis, sequela      estradiol (ESTRACE) 0.1 MG/GM vaginal cream Place vaginally every other day.      evolocumab (REPATHA SURECLICK) 140 MG/ML prefilled autoinjector Inject 1 mL (140 mg) subcutaneously every 14 days. . Please have fasting labs drawn for further refills.  Qty: 6 mL, Refills: 3    Associated Diagnoses: Hyperlipidemia LDL goal <70; Statin intolerance; HDL deficiency; Type 2 diabetes mellitus with stage 3 chronic kidney disease, without long-term current use of insulin (H); Hypertriglyceridemia; H/O: CVA (cerebrovascular accident)      ezetimibe (ZETIA) 10 MG tablet Take 1 tablet (10 mg) by mouth daily.  Qty: 90 tablet, Refills: 3    Associated Diagnoses: Hyperlipidemia LDL goal <70; Benign essential hypertension      Ferrous Sulfate 324 MG TBEC Take 1 tablet by mouth daily.      folic acid (FOLVITE) 1 MG tablet Take 1 tablet (1 mg) by mouth daily.  Qty: 100 tablet, Refills: 0    Associated Diagnoses: Liver replaced by transplant (H)      gabapentin (NEURONTIN) 250 MG/5ML solution Take 6 mLs (300 mg) by mouth at bedtime  Qty: 180 mL, Refills: 0    Associated Diagnoses: Liver replaced by transplant (H)      glucose (BD GLUCOSE) 5 g chewable tablet Take 2 tablets (10 g) by mouth as needed (low blood sugar)  Qty: 40 tablet, Refills: 1    Associated Diagnoses: Type 2 diabetes mellitus with stage 3 chronic kidney disease, without long-term current use of insulin (H)      hypromellose (ARTIFICIAL TEARS) 0.4 % SOLN ophthalmic solution Apply 1 drop to eye every hour as needed for dry eyes    Associated Diagnoses: Liver replaced by transplant (H)      ketoconazole (NIZORAL) 2 % external cream Apply  topically 2 times daily as needed (redness and flaking). Apply to the face.  Qty: 30 g, Refills: 3    Associated Diagnoses: Seborrheic dermatitis      ketoconazole (NIZORAL) 2 % external shampoo Apply topically three times a week. Lather in the shower, wait 3-5 minutes before rinsing.  Qty: 100 mL, Refills: 11    Associated Diagnoses: Seborrheic dermatitis      levothyroxine (SYNTHROID/LEVOTHROID) 175 MCG tablet Take 1 tablet (175 mcg) by mouth daily.  Qty: 90 tablet, Refills: 1    Associated Diagnoses: Postoperative hypothyroidism      linagliptin (TRADJENTA) 5 MG TABS tablet Take 1 tablet (5 mg) by mouth daily.  Qty: 90 tablet, Refills: 1    Associated Diagnoses: Postoperative hypothyroidism; Papillary thyroid carcinoma (H); Type 2 diabetes mellitus with stage 3 chronic kidney disease, without long-term current use of insulin (H)      meclizine (ANTIVERT) 25 MG tablet Take 1 tablet (25 mg) by mouth as needed for dizziness or other (migraines)  Qty: 30 tablet, Refills: 11    Associated Diagnoses: Dizziness      Multiple Vitamins-Minerals (PRESERVISION AREDS 2) CAPS Take 1 capsule by mouth 2 times daily    Associated Diagnoses: Liver replaced by transplant (H)      Nutrisource Fiber PO packet Take 1 packet by mouth daily.      omega-3 acid ethyl esters (LOVAZA) 1 g capsule Take 1 capsule by mouth 2 times daily  Qty: 180 capsule, Refills: 1    Associated Diagnoses: Hypertriglyceridemia      predniSONE (DELTASONE) 10 MG tablet Take 1 tablet (10 mg) by mouth daily.  Qty: 90 tablet, Refills: 3    Comments: TXP DT 5/22/2002 (Liver) TXP Dischg DT 6/3/2002 DX Liver replaced by transplant Z94.4 TX Center [facility] ([place], [place])  Associated Diagnoses: Liver replaced by transplant (H)      sirolimus (GENERIC EQUIVALENT) 1 MG tablet Take 1 tablet (1 mg) by mouth daily.  Qty: 90 tablet, Refills: 3    Comments: TXP DT 5/22/2002 (Liver) TXP Dischg DT 6/3/2002 DX Liver replaced by  transplant Z94.4 TX Center [facility] ([place], [place])  Associated Diagnoses: Liver replaced by transplant (H)      ursodiol (ACTIGALL) 250 MG tablet Take 1 tablet (250 mg) by mouth 2 times daily.  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  Qty: 2 each, Refills: 5    Comments: Medicare B  Associated Diagnoses: Postoperative hypothyroidism; Papillary thyroid carcinoma (H); Type 2 diabetes mellitus with stage 3 chronic kidney disease, without long-term current use of insulin (H)           STOP taking these medications       hydrALAZINE (APRESOLINE) 50 MG tablet Comments:   Reason for Stopping:             Allergies   Allergies   Allergen Reactions    Fluconazole Hives and Itching     Full body hives    [See intradermal skin testing results from 8/2/2019]    Mycophenolate Diarrhea and Nausea and Vomiting     Patient stated it was chronic and lasted months      Penicillins Anaphylaxis, Hives, Itching and Rash     [See intradermal skin testing results from 8/2/2019]      Simvastatin Muscle Pain (Myalgia)     severe  Other reaction(s): Myalgia caused by statin    Methotrexate Other (See Comments)     Other reaction(s): Sore  Sores in mouth, esophagus, and stomach.       Morphine And Codeine Itching and Other (See Comments)     Psych disturbance  Other reaction(s): Confusion, Mood alteration    Quinolones Anxiety, Dizziness, Headache, Other (See Comments), Palpitations and Unknown     Other reaction(s): Hyperactive behavior, Lightheadedness, Mood alteration    Dizzy, light headed    Dizziness, shaky, and jumpy    Benadryl [Diphenhydramine Hcl]      Insomnia     Capsules, Empty Gelatin [Gelatin]     Lansoprazole Diarrhea    Azithromycin Itching     [See intradermal skin testing results from 8/2/2019]    Bactrim [Sulfamethoxazole-Trimethoprim] Other (See  Comments)     Numb mouth, tingling lips (treated with anti-histamines)    Cephalosporins Itching     [See intradermal skin testing results from 8/2/2019]    Ciprofloxacin Hcl Other (See Comments) and Dizziness     Insomnia, mood lability, Irregular heart beat         Doxycycline Itching and Unknown     [See intradermal skin testing results from 8/2/2019]    Lisinopril Cough    Omeprazole Itching    Tolectin [Nsaids] Rash    Tolmetin Rash and Itching    Tramadol Rash, Hives and Itching     Data   Most Recent 3 CBC's:  Recent Labs   Lab Test 01/05/25  0721 01/04/25  0742 01/03/25  1733   WBC 6.3 7.7 9.2   HGB 8.2* 9.0* 10.2*   MCV 91 89 89    346 418      Most Recent 3 BMP's:  Recent Labs   Lab Test 01/05/25  0741 01/05/25  0721 01/05/25  0209 01/04/25  1517 01/04/25  0742 01/03/25  1733   NA  --  141  --   --  138 139   POTASSIUM  --  3.8  --   --  4.3 3.5   CHLORIDE  --  109*  --   --  108* 103   CO2  --  20*  --   --  17* 18*   BUN  --  47.5*  --   --  39.3* 45.5*   CR  --  1.81*  --   --  1.68* 1.77*   ANIONGAP  --  12  --   --  13 18*   KEILY  --  8.5*  --   --  8.5* 9.2   * 145* 170*   < > 183* 112*    < > = values in this interval not displayed.     Most Recent 2 LFT's:  Recent Labs   Lab Test 01/03/25  1733 12/14/24  0743   AST 22 31   ALT 26 66*   ALKPHOS 108 106   BILITOTAL 0.4 <0.2     Most Recent INR's and Anticoagulation Dosing History:  Anticoagulation Dose History  More data exists         Latest Ref Rng & Units 10/14/2013 11/6/2013 5/13/2016 5/16/2016 5/18/2018 8/26/2019 1/3/2025   Recent Dosing and Labs   INR 0.85 - 1.15 0.98  0.96  1.11  1.02  1.06  1.33  1.23      Most Recent 3 Troponin's:  Recent Labs   Lab Test 05/18/18  0731   TROPI <0.015     Most Recent Cholesterol Panel:  Recent Labs   Lab Test 09/04/24  1008   CHOL 291*   *   HDL 72   TRIG 329*     Most Recent 6 Bacteria Isolates From Any Culture (See EPIC Reports for Culture Details):  Recent Labs   Lab Test 08/30/19  0657  19  0544 19  1718 19  1710 19  0224 19  0150   CULT No growth No growth No growth No growth 10,000 to 50,000 colonies/mL  Escherichia coli  * Cultured on the 1st day of incubation:  Escherichia coli  Susceptibility testing done on previous specimen  *  Critical Value/Significant Value, preliminary result only, called to and read back by   Maria Teresa Martines RN 2019 @1425 dk/hdp       Most Recent TSH, T4 and A1c Labs:  Recent Labs   Lab Test 12/10/24  2148 24  1506   TSH  --  3.31   T4  --  1.92*   A1C 6.9*  --      Results for orders placed or performed during the hospital encounter of 25   Chest XR,  PA & LAT    Narrative    EXAM: XR CHEST 2 VIEWS  LOCATION: [facility]  DATE: 1/3/2025    INDICATION: Cough, fever  COMPARISON: 2024.      Impression    IMPRESSION: Cardiac silhouette is within normal limits. No focal airspace consolidation. Trace left pleural effusion. No discernible pneumothorax. Left chest loop recorder.   Echocardiogram Complete     Value    LVEF  60-65%    Narrative    [id number]  [id number]  [id number]  [id number]^MARY JO^CARMEN^MAIA     [facility]  Echocardiography Laboratory  [address] [place] [address]     Name: ISA CAMARGO  MRN: [id number]  : 1950  Study Date: 2025 07:12 AM  Age: 74 yrs  Gender: Female  Patient Location: [facility]  Reason For Study: MI  Ordering Physician: CARMEN CAMARGO  Referring Physician: Daniel Hidalgo  Performed By: Maddi Cho     BSA: 1.9 m2  Height: 67 in  Weight: 175 lb  HR: 79  BP: 97/47 mmHg  ______________________________________________________________________________  Procedure  Echocardiogram with two-dimensional, color and spectral Doppler. Technically  difficult study.Extremely poor acoustic windows.  ______________________________________________________________________________  Interpretation Summary     Technically difficult study  with poor acoustic windows. Patient refused echo  contrast and terminated study early.     1. LV appears normal in size with mild concentric LVH. LVEF is normal with  LVEF 60-65%. No obvious wall motion abnormalities.  2. Normal RV size and systolic function.  3. LA is normal size. RA not well visualized.  4. Valvular anatomy is not well seen due to poor 2D image quality, though  there does appear to be thickening and calcification of both the aortic and  mitral valve leaflets. No significant valvular stenosis or regurgitation,  however, on Doppler assessment.  5. No pericardial effusion noted on limited windows.     Prior TTE from 5/18/2018 showed normal LVEF with 1-2+ MR.  ______________________________________________________________________________  Left Ventricle  The left ventricle is normal in size. There is mild concentric left  ventricular hypertrophy. Left ventricular systolic function is normal. The  visual ejection fraction is 60-65%. Left ventricular diastolic function is  indeterminate. No regional wall motion abnormalities noted.     Right Ventricle  The right ventricle is normal size. The right ventricular systolic function is  normal.     Atria  Normal left atrial size. Right atrium not well visualized.     Mitral Valve  Mitral valve leaflets are thickened and calcified with restricted motion.  There is mild mitral annular calcification. There is trace mitral  regurgitation. The mean mitral valve gradient is 3.2 mmHg.     Tricuspid Valve  The tricuspid valve is not well visualized. There is trace tricuspid  regurgitation. Right ventricular systolic pressure could not be approximated  due to inadequate tricuspid regurgitation.     Aortic Valve  Aortic valve is not well visualized but probably trileaflet, with moderate  sclerosis of the LCC and NCC. There is trace to mild aortic regurgitation. No  aortic stenosis is present.     Pulmonic Valve  The pulmonic valve is not well visualized.      Vessels  The aortic root is normal size. Normal size ascending aorta.     Pericardium  The pericardium is not well visualized. There is no pericardial effusion.     ______________________________________________________________________________  MMode/2D Measurements & Calculations  Ao root diam: 3.0 cm  asc Aorta Diam: 3.5 cm  LVOT diam: 1.9 cm  LVOT area: 3.0 cm2  Ao root diam index Ht(cm/m): 1.8     Ao root diam index BSA (cm/m2): 1.6  Asc Ao diam index BSA (cm/m2): 1.9  Asc Ao diam index Ht(cm/m): 2.1  LA Volume (BP): 48.3 ml  LA Volume Index (BP): 25.3 ml/m2  TAPSE: 2.5 cm     Doppler Measurements & Calculations  MV E max nate: 78.2 cm/sec  MV A max nate: 99.2 cm/sec  MV E/A: 0.79  MV max P.6 mmHg  MV mean PG: 3.2 mmHg  MV V2 VTI: 32.2 cm  MVA(VTI): 2.3 cm2     MV dec slope: 325.6 cm/sec2  MV dec time: 0.24 sec  Ao V2 max: 191.5 cm/sec  Ao max PG: 15.0 mmHg  Ao V2 mean: 131.5 cm/sec  Ao mean P.0 mmHg  Ao V2 VTI: 33.8 cm  MAYUR(I,D): 2.2 cm2  MAYUR(V,D): 2.2 cm2  LV V1 max P.6 mmHg  LV V1 max: 138.0 cm/sec  LV V1 VTI: 25.2 cm  SV(LVOT): 75.2 ml  SI(LVOT): 39.4 ml/m2  PA V2 max: 118.1 cm/sec  PA max P.6 mmHg  PA acc time: 0.09 sec  AV Nate Ratio (DI): 0.72  MAYUR Index (cm2/m2): 1.2  E/E' avg: 15.0  Lateral E/e': 13.3  Medial E/e': 16.7  RV S Nate: 13.2 cm/sec     ______________________________________________________________________________  Report approved by: MD Mauricio Villar on 2025 11:46 AM           *Note: Due to a large number of results and/or encounters for the requested time period, some results have not been displayed. A complete set of results can be found in Results Review.           Disclaimer: This note consists of symbols derived from keyboarding, dictation and/or voice recognition software. As a result, there may be errors in the script that have gone undetected. Please consider this when interpreting information found in this chart.

## 2025-01-05 NOTE — PLAN OF CARE
"Pertinent assessments: A&O, up sba belt and cane. Weaned to RA, denies pain sob or nausea. Refused sliding scale insulin at this time. Skin assessment complete.     Major Shift Events Admit to unit    Treatment Plan: Awaiting BP improvement, monitoring temp.    Bedside Nurse: Mary Anne Moss RN     Problem: Pain Acute  Goal: Optimal Pain Control and Function  Outcome: Not Progressing  Intervention: Prevent or Manage Pain  Recent Flowsheet Documentation  Taken 1/4/2025 1603 by Mary Anne Moss, RN  Medication Review/Management: medications reviewed     Problem: Adult Inpatient Plan of Care  Goal: Plan of Care Review  Description: The Plan of Care Review/Shift note should be completed every shift.  The Outcome Evaluation is a brief statement about your assessment that the patient is improving, declining, or no change.  This information will be displayed automatically on your shift  note.  Outcome: Not Progressing  Flowsheets (Taken 1/4/2025 1935)  Outcome Evaluation: Treatment Plan: Awaiting BP improvement, monitoring temp.  Plan of Care Reviewed With: patient  Overall Patient Progress: no change  Goal: Patient-Specific Goal (Individualized)  Description: You can add care plan individualizations to a care plan. Examples of Individualization might be:  \"Parent requests to be called daily at 9am for status\", \"I have a hard time hearing out of my right ear\", or \"Do not touch me to wake me up as it startles  me\".  Outcome: Not Progressing  Goal: Absence of Hospital-Acquired Illness or Injury  Outcome: Not Progressing  Intervention: Identify and Manage Fall Risk  Recent Flowsheet Documentation  Taken 1/4/2025 1603 by Mary Anne Moss RN  Safety Promotion/Fall Prevention:   activity supervised   clutter free environment maintained   increased rounding and observation   increase visualization of patient  Intervention: Prevent and Manage VTE (Venous Thromboembolism) Risk  Recent Flowsheet Documentation  Taken 1/4/2025 " 1603 by Mary Anne Moss RN  VTE Prevention/Management: SCDs off (sequential compression devices)  Intervention: Prevent Infection  Recent Flowsheet Documentation  Taken 1/4/2025 1603 by Mary Anne Moss RN  Infection Prevention: rest/sleep promoted  Goal: Optimal Comfort and Wellbeing  Outcome: Not Progressing  Goal: Readiness for Transition of Care  Outcome: Not Progressing  Intervention: Mutually Develop Transition Plan  Recent Flowsheet Documentation  Taken 1/4/2025 1555 by Mary Anne Moss RN  Equipment Currently Used at Home: cane, quad     Problem: Infection  Goal: Absence of Infection Signs and Symptoms  Outcome: Not Progressing  Intervention: Prevent or Manage Infection  Recent Flowsheet Documentation  Taken 1/4/2025 1603 by Mary Anne Moss RN  Isolation Precautions: contact precautions maintained   Goal Outcome Evaluation:      Plan of Care Reviewed With: patient    Overall Patient Progress: no changeOverall Patient Progress: no change    Outcome Evaluation: Treatment Plan: Awaiting BP improvement, monitoring temp.

## 2025-01-05 NOTE — PLAN OF CARE
A/O, denies pain. Up with assist. Samish. Bed alarm on. Tele- SR with bradycardia this morning. Pale dry skin with bruising.     Major Shift Events   Uneventful night, slept well. No significant change in condition.     Treatment Plan:   Monitor on tele, Vitals & labs.                               Problem: Adult Inpatient Plan of Care  Goal: Plan of Care Review  Description: The Plan of Care Review/Shift note should be completed every shift.  The Outcome Evaluation is a brief statement about your assessment that the patient is improving, declining, or no change.  This information will be displayed automatically on your shift  note.  Recent Flowsheet Documentation  Taken 1/5/2025 0758 by Suzan Mejia, RN  Plan of Care Reviewed With: patient  Overall Patient Progress: improving  Goal: Absence of Hospital-Acquired Illness or Injury  Intervention: Identify and Manage Fall Risk  Recent Flowsheet Documentation  Taken 1/5/2025 0000 by Suzan Mejia, RN  Safety Promotion/Fall Prevention: safety round/check completed  Intervention: Prevent Skin Injury  Recent Flowsheet Documentation  Taken 1/5/2025 0000 by Suzan Mejia, RN  Body Position: position changed independently   Goal Outcome Evaluation:      Plan of Care Reviewed With: patient    Overall Patient Progress: improvingOverall Patient Progress: improving

## 2025-01-06 ENCOUNTER — NURSE TRIAGE (OUTPATIENT)
Dept: NURSING | Facility: CLINIC | Age: 75
End: 2025-01-06
Payer: MEDICARE

## 2025-01-06 ENCOUNTER — TELEPHONE (OUTPATIENT)
Dept: DERMATOLOGY | Facility: CLINIC | Age: 75
End: 2025-01-06

## 2025-01-06 ENCOUNTER — TELEPHONE (OUTPATIENT)
Dept: CARDIOLOGY | Facility: CLINIC | Age: 75
End: 2025-01-06

## 2025-01-06 ENCOUNTER — HOSPITAL ENCOUNTER (INPATIENT)
Facility: CLINIC | Age: 75
DRG: 194 | End: 2025-01-06
Attending: EMERGENCY MEDICINE | Admitting: INTERNAL MEDICINE
Payer: MEDICARE

## 2025-01-06 ENCOUNTER — APPOINTMENT (OUTPATIENT)
Dept: GENERAL RADIOLOGY | Facility: CLINIC | Age: 75
DRG: 194 | End: 2025-01-06
Attending: EMERGENCY MEDICINE
Payer: MEDICARE

## 2025-01-06 DIAGNOSIS — Z09 HOSPITAL DISCHARGE FOLLOW-UP: ICD-10-CM

## 2025-01-06 DIAGNOSIS — Z94.4 LIVER REPLACED BY TRANSPLANT (H): Primary | ICD-10-CM

## 2025-01-06 DIAGNOSIS — I48.0 PAROXYSMAL ATRIAL FIBRILLATION (H): ICD-10-CM

## 2025-01-06 DIAGNOSIS — J18.9 PNEUMONIA DUE TO INFECTIOUS ORGANISM, UNSPECIFIED LATERALITY, UNSPECIFIED PART OF LUNG: ICD-10-CM

## 2025-01-06 LAB
ALBUMIN SERPL BCG-MCNC: 3.5 G/DL (ref 3.5–5.2)
ALP SERPL-CCNC: 149 U/L (ref 40–150)
ALT SERPL W P-5'-P-CCNC: ABNORMAL U/L
ANION GAP SERPL CALCULATED.3IONS-SCNC: 19 MMOL/L (ref 7–15)
AST SERPL W P-5'-P-CCNC: ABNORMAL U/L
ATRIAL RATE - MUSE: 82 BPM
BASOPHILS # BLD AUTO: 0.1 10E3/UL (ref 0–0.2)
BASOPHILS NFR BLD AUTO: 1 %
BILIRUB SERPL-MCNC: 0.3 MG/DL
BUN SERPL-MCNC: 40.4 MG/DL (ref 8–23)
CALCIUM SERPL-MCNC: 9.1 MG/DL (ref 8.8–10.4)
CHLORIDE SERPL-SCNC: 104 MMOL/L (ref 98–107)
CREAT SERPL-MCNC: 1.33 MG/DL (ref 0.51–0.95)
DIASTOLIC BLOOD PRESSURE - MUSE: NORMAL MMHG
EGFRCR SERPLBLD CKD-EPI 2021: 42 ML/MIN/1.73M2
EOSINOPHIL # BLD AUTO: 0.1 10E3/UL (ref 0–0.7)
EOSINOPHIL NFR BLD AUTO: 1 %
ERYTHROCYTE [DISTWIDTH] IN BLOOD BY AUTOMATED COUNT: 15.8 % (ref 10–15)
GLUCOSE SERPL-MCNC: 144 MG/DL (ref 70–99)
HCO3 BLDV-SCNC: 21 MMOL/L (ref 21–28)
HCO3 SERPL-SCNC: 16 MMOL/L (ref 22–29)
HCT VFR BLD AUTO: 32.2 % (ref 35–47)
HGB BLD-MCNC: 10.5 G/DL (ref 11.7–15.7)
IMM GRANULOCYTES # BLD: 0.4 10E3/UL
IMM GRANULOCYTES NFR BLD: 4 %
INTERPRETATION ECG - MUSE: NORMAL
LACTATE BLD-SCNC: 1.4 MMOL/L
LYMPHOCYTES # BLD AUTO: 1 10E3/UL (ref 0.8–5.3)
LYMPHOCYTES NFR BLD AUTO: 11 %
MCH RBC QN AUTO: 28.7 PG (ref 26.5–33)
MCHC RBC AUTO-ENTMCNC: 32.6 G/DL (ref 31.5–36.5)
MCV RBC AUTO: 88 FL (ref 78–100)
MONOCYTES # BLD AUTO: 1 10E3/UL (ref 0–1.3)
MONOCYTES NFR BLD AUTO: 11 %
NEUTROPHILS # BLD AUTO: 6.7 10E3/UL (ref 1.6–8.3)
NEUTROPHILS NFR BLD AUTO: 73 %
NRBC # BLD AUTO: 0 10E3/UL
NRBC BLD AUTO-RTO: 0 /100
P AXIS - MUSE: 83 DEGREES
PCO2 BLDV: 27 MM HG (ref 40–50)
PH BLDV: 7.5 [PH] (ref 7.32–7.43)
PLATELET # BLD AUTO: 437 10E3/UL (ref 150–450)
PO2 BLDV: 37 MM HG (ref 25–47)
POTASSIUM SERPL-SCNC: 4.5 MMOL/L (ref 3.4–5.3)
PR INTERVAL - MUSE: 188 MS
PROCALCITONIN SERPL IA-MCNC: 0.1 NG/ML
PROT SERPL-MCNC: 7.1 G/DL (ref 6.4–8.3)
QRS DURATION - MUSE: 76 MS
QT - MUSE: 372 MS
QTC - MUSE: 434 MS
R AXIS - MUSE: 11 DEGREES
RBC # BLD AUTO: 3.66 10E6/UL (ref 3.8–5.2)
SAO2 % BLDV: 77 % (ref 70–75)
SODIUM SERPL-SCNC: 139 MMOL/L (ref 135–145)
SYSTOLIC BLOOD PRESSURE - MUSE: NORMAL MMHG
T AXIS - MUSE: 44 DEGREES
VENTRICULAR RATE- MUSE: 82 BPM
WBC # BLD AUTO: 9.2 10E3/UL (ref 4–11)

## 2025-01-06 PROCEDURE — 85004 AUTOMATED DIFF WBC COUNT: CPT | Performed by: EMERGENCY MEDICINE

## 2025-01-06 PROCEDURE — 84155 ASSAY OF PROTEIN SERUM: CPT | Performed by: EMERGENCY MEDICINE

## 2025-01-06 PROCEDURE — 71046 X-RAY EXAM CHEST 2 VIEWS: CPT

## 2025-01-06 PROCEDURE — 82803 BLOOD GASES ANY COMBINATION: CPT

## 2025-01-06 PROCEDURE — 36415 COLL VENOUS BLD VENIPUNCTURE: CPT | Performed by: EMERGENCY MEDICINE

## 2025-01-06 PROCEDURE — 87040 BLOOD CULTURE FOR BACTERIA: CPT | Performed by: EMERGENCY MEDICINE

## 2025-01-06 PROCEDURE — 87637 SARSCOV2&INF A&B&RSV AMP PRB: CPT | Performed by: EMERGENCY MEDICINE

## 2025-01-06 PROCEDURE — 99285 EMERGENCY DEPT VISIT HI MDM: CPT | Mod: 25

## 2025-01-06 PROCEDURE — 87641 MR-STAPH DNA AMP PROBE: CPT | Performed by: INTERNAL MEDICINE

## 2025-01-06 PROCEDURE — 96374 THER/PROPH/DIAG INJ IV PUSH: CPT | Mod: 59

## 2025-01-06 PROCEDURE — 84145 PROCALCITONIN (PCT): CPT | Performed by: EMERGENCY MEDICINE

## 2025-01-06 PROCEDURE — 93005 ELECTROCARDIOGRAM TRACING: CPT

## 2025-01-06 PROCEDURE — 84075 ASSAY ALKALINE PHOSPHATASE: CPT | Performed by: EMERGENCY MEDICINE

## 2025-01-06 PROCEDURE — 80195 ASSAY OF SIROLIMUS: CPT | Performed by: INTERNAL MEDICINE

## 2025-01-06 PROCEDURE — 87640 STAPH A DNA AMP PROBE: CPT | Performed by: INTERNAL MEDICINE

## 2025-01-06 PROCEDURE — 83605 ASSAY OF LACTIC ACID: CPT

## 2025-01-06 PROCEDURE — 96375 TX/PRO/DX INJ NEW DRUG ADDON: CPT | Mod: 59

## 2025-01-06 RX ORDER — CEFEPIME HYDROCHLORIDE 2 G/1
2 INJECTION, POWDER, FOR SOLUTION INTRAVENOUS ONCE
Status: COMPLETED | OUTPATIENT
Start: 2025-01-06 | End: 2025-01-07

## 2025-01-06 RX ORDER — VANCOMYCIN HYDROCHLORIDE 1 G/200ML
1000 INJECTION, SOLUTION INTRAVENOUS EVERY 24 HOURS
Status: DISCONTINUED | OUTPATIENT
Start: 2025-01-07 | End: 2025-01-07

## 2025-01-06 ASSESSMENT — ACTIVITIES OF DAILY LIVING (ADL): ADLS_ACUITY_SCORE: 57

## 2025-01-06 NOTE — TELEPHONE ENCOUNTER
Message from patient concerned about changes made during weekend admission and her instructions to connect with her providers for further testing.    Per discharge notes:  Hydralazine was discontinued due to potential orthostatic hypotension. Beta-blocker dosage was reduced due to transient bradycardia in HR 30's, and amlodipine 2.5 mg was added which can be titrated as needed per primary care and follow-up. The patient is on a loop monitor to assess the need for a pacemaker.     Outpatient Lexiscan MPI recommended by cardiology, with further stress testing deferred to the primary care physician     She also needs to call cardiology EP clinic in [facility] tomorrow for the reading of her loop monitor and discussion if she needs a pacemaker which has been ongoing with the EP team at [facility] per patient   ==================    Called patient to review recent admission. Patient will be calling device team to recheck her loop recorder.    Patient notes she was very worried when told she had a NSTEMI. She would prefer if Dr. Zuluaga would decide if she needs a stress test. She thinks she had one in February in [place].  Patient also wants to see Dr. Zuluaga again for discharge follow up.    Will message Dr. Zuluaga to review    Per patient request, eliquis refill sent to local [facility] in [place]

## 2025-01-06 NOTE — TELEPHONE ENCOUNTER
Health Call Center    Phone Message    May a detailed message be left on voicemail: yes     Reason for Call: Appointment Intake    Referring Provider Name: Edel Blair  Diagnosis and/or Symptoms: Biopsy    Pt states she is returning a call to schedule a biopsy.     Biopsy is not listed in protocols as something Writer can schedule. Please call pt back to schedule. Thank you.     Action Taken: Message routed to:  [facility] Derm    Travel Screening: Not Applicable     Date of Service:

## 2025-01-06 NOTE — TELEPHONE ENCOUNTER
Reply from Dr. Zuluaga:  Kirill Zuluaga MD Anderson, Alyson BOO RN  Caller: Unspecified (Today,  3:09 PM)  It appears she was hospitalized with a viral syndrome and dehydration.  If she is eating and drinking well she can resume her hydralazine.   we can wait a week and consider increasing her beta-blocker at that time if she is still eating and drinking well.  Continue with testing as previously recommended      Will ask about ordering lexiscan    Reply from Dr. Zuluaga  I think I did previously recommend a Lexiscan when we found out she had no previous coronary evaluation in [place]..  Yes go ahead and order Lexiscan     Will ask about stopping amlodipine if going back to hydralazine 25mg TID

## 2025-01-06 NOTE — TELEPHONE ENCOUNTER
"Patient called stating that she was hospitalized from 1/3-1/5. When she woke up on 1/5 AM she was looking out the window slowly waking up when a nurse came running into her room because her heart rate was reading in the 30s. She was asymptomatic with episodes. She was told to call the device clinic to see if any zhou episodes were logged for review.   1/6/25 loop transmission reviewed. No zhou episodes logged.    Patient's device was implanted in [place] following a TIA episode. She has been diagnosed with pAF and takes eliquis and \"pill in the pocket\" metoprolol for episodes.   Her Zhou \"episode detection\" parameters are set to on at an interval rate of 30bpm (2000ms) with a duration of 12 beats.     It is possible that patient's HR was just above 30 or that her HR was at/lower than 30bpm but for less than 12 beats.  Zhou alert adjusted to <40bpm for 12 beats or longer (with nighttime ECG storage ON) to ensure we will be notified of any recurrent episodes.    TY RN  "

## 2025-01-07 ENCOUNTER — TELEPHONE (OUTPATIENT)
Dept: DERMATOLOGY | Facility: CLINIC | Age: 75
End: 2025-01-07
Payer: MEDICARE

## 2025-01-07 PROBLEM — J18.9 PNEUMONIA DUE TO INFECTIOUS ORGANISM, UNSPECIFIED LATERALITY, UNSPECIFIED PART OF LUNG: Status: ACTIVE | Noted: 2025-01-07

## 2025-01-07 PROBLEM — J18.9 PNEUMONIA: Status: ACTIVE | Noted: 2025-01-07

## 2025-01-07 LAB
ALBUMIN UR-MCNC: 20 MG/DL
ANION GAP SERPL CALCULATED.3IONS-SCNC: 13 MMOL/L (ref 7–15)
APPEARANCE UR: CLEAR
ATRIAL RATE - MUSE: 90 BPM
BACTERIA #/AREA URNS HPF: ABNORMAL /HPF
BILIRUB UR QL STRIP: NEGATIVE
BUN SERPL-MCNC: 35.9 MG/DL (ref 8–23)
CALCIUM SERPL-MCNC: 8.8 MG/DL (ref 8.8–10.4)
CHLORIDE SERPL-SCNC: 108 MMOL/L (ref 98–107)
COLOR UR AUTO: ABNORMAL
CREAT SERPL-MCNC: 1.46 MG/DL (ref 0.51–0.95)
DIASTOLIC BLOOD PRESSURE - MUSE: NORMAL MMHG
EGFRCR SERPLBLD CKD-EPI 2021: 37 ML/MIN/1.73M2
ERYTHROCYTE [DISTWIDTH] IN BLOOD BY AUTOMATED COUNT: 16 % (ref 10–15)
FLUAV RNA SPEC QL NAA+PROBE: NEGATIVE
FLUBV RNA RESP QL NAA+PROBE: NEGATIVE
GLUCOSE BLDC GLUCOMTR-MCNC: 180 MG/DL (ref 70–99)
GLUCOSE BLDC GLUCOMTR-MCNC: 227 MG/DL (ref 70–99)
GLUCOSE BLDC GLUCOMTR-MCNC: 95 MG/DL (ref 70–99)
GLUCOSE SERPL-MCNC: 109 MG/DL (ref 70–99)
GLUCOSE UR STRIP-MCNC: 300 MG/DL
HCO3 SERPL-SCNC: 19 MMOL/L (ref 22–29)
HCT VFR BLD AUTO: 31.3 % (ref 35–47)
HGB BLD-MCNC: 10 G/DL (ref 11.7–15.7)
HGB UR QL STRIP: NEGATIVE
HOLD SPECIMEN: NORMAL
INTERPRETATION ECG - MUSE: NORMAL
KETONES UR STRIP-MCNC: NEGATIVE MG/DL
LEUKOCYTE ESTERASE UR QL STRIP: NEGATIVE
MCH RBC QN AUTO: 28.7 PG (ref 26.5–33)
MCHC RBC AUTO-ENTMCNC: 31.9 G/DL (ref 31.5–36.5)
MCV RBC AUTO: 90 FL (ref 78–100)
MRSA DNA SPEC QL NAA+PROBE: NEGATIVE
MUCOUS THREADS #/AREA URNS LPF: PRESENT /LPF
NITRATE UR QL: NEGATIVE
P AXIS - MUSE: 25 DEGREES
PH UR STRIP: 5.5 [PH] (ref 5–7)
PLATELET # BLD AUTO: 373 10E3/UL (ref 150–450)
POTASSIUM SERPL-SCNC: 3.9 MMOL/L (ref 3.4–5.3)
PR INTERVAL - MUSE: 176 MS
QRS DURATION - MUSE: 78 MS
QT - MUSE: 352 MS
QTC - MUSE: 430 MS
R AXIS - MUSE: 8 DEGREES
RBC # BLD AUTO: 3.48 10E6/UL (ref 3.8–5.2)
RBC URINE: 1 /HPF
RSV RNA SPEC NAA+PROBE: NEGATIVE
SA TARGET DNA: NEGATIVE
SARS-COV-2 RNA RESP QL NAA+PROBE: NEGATIVE
SIROLIMUS BLD-MCNC: 4.5 UG/L (ref 5–15)
SODIUM SERPL-SCNC: 140 MMOL/L (ref 135–145)
SP GR UR STRIP: 1.01 (ref 1–1.03)
SQUAMOUS EPITHELIAL: 1 /HPF
SYSTOLIC BLOOD PRESSURE - MUSE: NORMAL MMHG
T AXIS - MUSE: 39 DEGREES
TME LAST DOSE: ABNORMAL H
TME LAST DOSE: ABNORMAL H
TROPONIN T SERPL HS-MCNC: 55 NG/L
TROPONIN T SERPL HS-MCNC: 55 NG/L
UROBILINOGEN UR STRIP-MCNC: NORMAL MG/DL
VENTRICULAR RATE- MUSE: 90 BPM
WBC # BLD AUTO: 7.9 10E3/UL (ref 4–11)
WBC URINE: 2 /HPF

## 2025-01-07 PROCEDURE — 36415 COLL VENOUS BLD VENIPUNCTURE: CPT | Performed by: INTERNAL MEDICINE

## 2025-01-07 PROCEDURE — 84484 ASSAY OF TROPONIN QUANT: CPT | Performed by: EMERGENCY MEDICINE

## 2025-01-07 PROCEDURE — 87040 BLOOD CULTURE FOR BACTERIA: CPT | Performed by: EMERGENCY MEDICINE

## 2025-01-07 PROCEDURE — 81003 URINALYSIS AUTO W/O SCOPE: CPT | Performed by: EMERGENCY MEDICINE

## 2025-01-07 PROCEDURE — 36415 COLL VENOUS BLD VENIPUNCTURE: CPT | Performed by: EMERGENCY MEDICINE

## 2025-01-07 PROCEDURE — 258N000003 HC RX IP 258 OP 636: Performed by: INTERNAL MEDICINE

## 2025-01-07 PROCEDURE — 250N000011 HC RX IP 250 OP 636: Performed by: INTERNAL MEDICINE

## 2025-01-07 PROCEDURE — 99222 1ST HOSP IP/OBS MODERATE 55: CPT | Mod: AI | Performed by: INTERNAL MEDICINE

## 2025-01-07 PROCEDURE — G0378 HOSPITAL OBSERVATION PER HR: HCPCS

## 2025-01-07 PROCEDURE — 80048 BASIC METABOLIC PNL TOTAL CA: CPT | Performed by: INTERNAL MEDICINE

## 2025-01-07 PROCEDURE — 258N000003 HC RX IP 258 OP 636: Performed by: EMERGENCY MEDICINE

## 2025-01-07 PROCEDURE — 82962 GLUCOSE BLOOD TEST: CPT

## 2025-01-07 PROCEDURE — 120N000001 HC R&B MED SURG/OB

## 2025-01-07 PROCEDURE — 85014 HEMATOCRIT: CPT | Performed by: INTERNAL MEDICINE

## 2025-01-07 PROCEDURE — 250N000012 HC RX MED GY IP 250 OP 636 PS 637: Performed by: INTERNAL MEDICINE

## 2025-01-07 PROCEDURE — 82435 ASSAY OF BLOOD CHLORIDE: CPT | Performed by: INTERNAL MEDICINE

## 2025-01-07 PROCEDURE — 250N000011 HC RX IP 250 OP 636: Performed by: EMERGENCY MEDICINE

## 2025-01-07 PROCEDURE — 250N000013 HC RX MED GY IP 250 OP 250 PS 637: Performed by: INTERNAL MEDICINE

## 2025-01-07 RX ORDER — AMOXICILLIN 250 MG
2 CAPSULE ORAL 2 TIMES DAILY PRN
Status: DISCONTINUED | OUTPATIENT
Start: 2025-01-07 | End: 2025-01-14 | Stop reason: HOSPADM

## 2025-01-07 RX ORDER — DIPHENHYDRAMINE HYDROCHLORIDE 50 MG/ML
25 INJECTION INTRAMUSCULAR; INTRAVENOUS ONCE
Status: COMPLETED | OUTPATIENT
Start: 2025-01-07 | End: 2025-01-07

## 2025-01-07 RX ORDER — SIROLIMUS 0.5 MG/1
1 TABLET, FILM COATED ORAL DAILY
Status: DISCONTINUED | OUTPATIENT
Start: 2025-01-07 | End: 2025-01-14 | Stop reason: HOSPADM

## 2025-01-07 RX ORDER — METOPROLOL SUCCINATE 25 MG/1
25 TABLET, EXTENDED RELEASE ORAL DAILY
Status: DISCONTINUED | OUTPATIENT
Start: 2025-01-07 | End: 2025-01-14 | Stop reason: HOSPADM

## 2025-01-07 RX ORDER — ACETAMINOPHEN 650 MG/1
650 SUPPOSITORY RECTAL EVERY 4 HOURS PRN
Status: DISCONTINUED | OUTPATIENT
Start: 2025-01-07 | End: 2025-01-14 | Stop reason: HOSPADM

## 2025-01-07 RX ORDER — CALCIUM CARBONATE 500 MG/1
1000 TABLET, CHEWABLE ORAL 4 TIMES DAILY PRN
Status: DISCONTINUED | OUTPATIENT
Start: 2025-01-07 | End: 2025-01-14 | Stop reason: HOSPADM

## 2025-01-07 RX ORDER — EZETIMIBE 10 MG/1
10 TABLET ORAL DAILY
Status: DISCONTINUED | OUTPATIENT
Start: 2025-01-07 | End: 2025-01-14 | Stop reason: HOSPADM

## 2025-01-07 RX ORDER — URSODIOL 250 MG/1
250 TABLET, FILM COATED ORAL 2 TIMES DAILY
Status: DISCONTINUED | OUTPATIENT
Start: 2025-01-07 | End: 2025-01-14 | Stop reason: HOSPADM

## 2025-01-07 RX ORDER — CEFEPIME HYDROCHLORIDE 2 G/1
2 INJECTION, POWDER, FOR SOLUTION INTRAVENOUS EVERY 12 HOURS
Status: DISCONTINUED | OUTPATIENT
Start: 2025-01-07 | End: 2025-01-07

## 2025-01-07 RX ORDER — ONDANSETRON 2 MG/ML
4 INJECTION INTRAMUSCULAR; INTRAVENOUS EVERY 6 HOURS PRN
Status: DISCONTINUED | OUTPATIENT
Start: 2025-01-07 | End: 2025-01-14 | Stop reason: HOSPADM

## 2025-01-07 RX ORDER — DIPHENHYDRAMINE HYDROCHLORIDE 50 MG/ML
25 INJECTION INTRAMUSCULAR; INTRAVENOUS EVERY 6 HOURS PRN
Status: DISCONTINUED | OUTPATIENT
Start: 2025-01-07 | End: 2025-01-14 | Stop reason: HOSPADM

## 2025-01-07 RX ORDER — MEROPENEM 1 G/1
1 INJECTION, POWDER, FOR SOLUTION INTRAVENOUS EVERY 12 HOURS
Status: DISCONTINUED | OUTPATIENT
Start: 2025-01-07 | End: 2025-01-13

## 2025-01-07 RX ORDER — DIPHENHYDRAMINE HCL 25 MG
25 CAPSULE ORAL EVERY 6 HOURS PRN
Status: DISCONTINUED | OUTPATIENT
Start: 2025-01-07 | End: 2025-01-14 | Stop reason: HOSPADM

## 2025-01-07 RX ORDER — PREDNISONE 10 MG/1
10 TABLET ORAL DAILY
Status: DISCONTINUED | OUTPATIENT
Start: 2025-01-07 | End: 2025-01-14 | Stop reason: HOSPADM

## 2025-01-07 RX ORDER — SODIUM CHLORIDE, SODIUM LACTATE, POTASSIUM CHLORIDE, CALCIUM CHLORIDE 600; 310; 30; 20 MG/100ML; MG/100ML; MG/100ML; MG/100ML
INJECTION, SOLUTION INTRAVENOUS CONTINUOUS
Status: ACTIVE | OUTPATIENT
Start: 2025-01-07 | End: 2025-01-07

## 2025-01-07 RX ORDER — MEROPENEM 1 G/1
1 INJECTION, POWDER, FOR SOLUTION INTRAVENOUS ONCE
Status: COMPLETED | OUTPATIENT
Start: 2025-01-07 | End: 2025-01-07

## 2025-01-07 RX ORDER — ONDANSETRON 4 MG/1
4 TABLET, ORALLY DISINTEGRATING ORAL EVERY 6 HOURS PRN
Status: DISCONTINUED | OUTPATIENT
Start: 2025-01-07 | End: 2025-01-14 | Stop reason: HOSPADM

## 2025-01-07 RX ORDER — VANCOMYCIN HYDROCHLORIDE 1 G/200ML
1000 INJECTION, SOLUTION INTRAVENOUS EVERY 24 HOURS
Status: DISCONTINUED | OUTPATIENT
Start: 2025-01-08 | End: 2025-01-08

## 2025-01-07 RX ORDER — ANTIOX #8/OM3/DHA/EPA/LUT/ZEAX 250-2.5 MG
1 CAPSULE ORAL 2 TIMES DAILY
Status: DISCONTINUED | OUTPATIENT
Start: 2025-01-07 | End: 2025-01-07

## 2025-01-07 RX ORDER — MEROPENEM 1 G/1
1 INJECTION, POWDER, FOR SOLUTION INTRAVENOUS EVERY 8 HOURS
Status: DISCONTINUED | OUTPATIENT
Start: 2025-01-07 | End: 2025-01-07 | Stop reason: DRUGHIGH

## 2025-01-07 RX ORDER — GABAPENTIN 250 MG/5ML
300 SOLUTION ORAL AT BEDTIME
Status: DISCONTINUED | OUTPATIENT
Start: 2025-01-07 | End: 2025-01-14 | Stop reason: HOSPADM

## 2025-01-07 RX ORDER — FOLIC ACID 1 MG/1
1 TABLET ORAL DAILY
Status: DISCONTINUED | OUTPATIENT
Start: 2025-01-07 | End: 2025-01-14 | Stop reason: HOSPADM

## 2025-01-07 RX ORDER — CALCITRIOL 0.25 UG/1
0.25 CAPSULE, LIQUID FILLED ORAL DAILY
Status: DISCONTINUED | OUTPATIENT
Start: 2025-01-07 | End: 2025-01-14 | Stop reason: HOSPADM

## 2025-01-07 RX ORDER — AMOXICILLIN 250 MG
1 CAPSULE ORAL 2 TIMES DAILY PRN
Status: DISCONTINUED | OUTPATIENT
Start: 2025-01-07 | End: 2025-01-14 | Stop reason: HOSPADM

## 2025-01-07 RX ORDER — ACETAMINOPHEN 325 MG/1
650 TABLET ORAL EVERY 4 HOURS PRN
Status: DISCONTINUED | OUTPATIENT
Start: 2025-01-07 | End: 2025-01-14 | Stop reason: HOSPADM

## 2025-01-07 RX ORDER — LIDOCAINE 40 MG/G
CREAM TOPICAL
Status: DISCONTINUED | OUTPATIENT
Start: 2025-01-07 | End: 2025-01-14 | Stop reason: HOSPADM

## 2025-01-07 RX ADMIN — URSODIOL 250 MG: 250 TABLET ORAL at 09:00

## 2025-01-07 RX ADMIN — MEROPENEM 1 G: 1 INJECTION, POWDER, FOR SOLUTION INTRAVENOUS at 15:44

## 2025-01-07 RX ADMIN — VANCOMYCIN HYDROCHLORIDE 1500 MG: 10 INJECTION, POWDER, LYOPHILIZED, FOR SOLUTION INTRAVENOUS at 03:41

## 2025-01-07 RX ADMIN — CEFEPIME HYDROCHLORIDE 2 G: 2 INJECTION, POWDER, FOR SOLUTION INTRAVENOUS at 00:12

## 2025-01-07 RX ADMIN — EZETIMIBE 10 MG: 10 TABLET ORAL at 22:01

## 2025-01-07 RX ADMIN — EZETIMIBE 10 MG: 10 TABLET ORAL at 09:00

## 2025-01-07 RX ADMIN — SITAGLIPTIN 50 MG: 50 TABLET, FILM COATED ORAL at 09:17

## 2025-01-07 RX ADMIN — MEROPENEM 1 G: 1 INJECTION, POWDER, FOR SOLUTION INTRAVENOUS at 02:24

## 2025-01-07 RX ADMIN — DIPHENHYDRAMINE HYDROCHLORIDE 25 MG: 50 INJECTION, SOLUTION INTRAMUSCULAR; INTRAVENOUS at 00:29

## 2025-01-07 RX ADMIN — GABAPENTIN 300 MG: 250 SUSPENSION ORAL at 22:01

## 2025-01-07 RX ADMIN — PREDNISONE 10 MG: 10 TABLET ORAL at 09:00

## 2025-01-07 RX ADMIN — APIXABAN 2.5 MG: 2.5 TABLET, FILM COATED ORAL at 19:58

## 2025-01-07 RX ADMIN — SIROLIMUS 1 MG: 0.5 TABLET ORAL at 09:00

## 2025-01-07 RX ADMIN — CALCITRIOL CAPSULES 0.25 MCG 0.25 MCG: 0.25 CAPSULE ORAL at 09:00

## 2025-01-07 RX ADMIN — SODIUM CHLORIDE, POTASSIUM CHLORIDE, SODIUM LACTATE AND CALCIUM CHLORIDE: 600; 310; 30; 20 INJECTION, SOLUTION INTRAVENOUS at 02:58

## 2025-01-07 RX ADMIN — LEVOTHYROXINE SODIUM 175 MCG: 150 TABLET ORAL at 08:59

## 2025-01-07 RX ADMIN — METOPROLOL SUCCINATE 25 MG: 25 TABLET, EXTENDED RELEASE ORAL at 08:59

## 2025-01-07 RX ADMIN — URSODIOL 250 MG: 250 TABLET ORAL at 19:58

## 2025-01-07 RX ADMIN — FOLIC ACID 1 MG: 1 TABLET ORAL at 08:59

## 2025-01-07 RX ADMIN — DIPHENHYDRAMINE HYDROCHLORIDE 25 MG: 50 INJECTION, SOLUTION INTRAMUSCULAR; INTRAVENOUS at 03:41

## 2025-01-07 RX ADMIN — APIXABAN 2.5 MG: 2.5 TABLET, FILM COATED ORAL at 09:00

## 2025-01-07 ASSESSMENT — ACTIVITIES OF DAILY LIVING (ADL)
ADLS_ACUITY_SCORE: 57
ADLS_ACUITY_SCORE: 62
ADLS_ACUITY_SCORE: 63
ADLS_ACUITY_SCORE: 60
ADLS_ACUITY_SCORE: 57
ADLS_ACUITY_SCORE: 57
ADLS_ACUITY_SCORE: 62
ADLS_ACUITY_SCORE: 62
ADLS_ACUITY_SCORE: 70
ADLS_ACUITY_SCORE: 60
ADLS_ACUITY_SCORE: 70
ADLS_ACUITY_SCORE: 57
ADLS_ACUITY_SCORE: 70
ADLS_ACUITY_SCORE: 60
ADLS_ACUITY_SCORE: 62
ADLS_ACUITY_SCORE: 60
ADLS_ACUITY_SCORE: 62
ADLS_ACUITY_SCORE: 63
ADLS_ACUITY_SCORE: 60
ADLS_ACUITY_SCORE: 60
ADLS_ACUITY_SCORE: 62
ADLS_ACUITY_SCORE: 62
ADLS_ACUITY_SCORE: 63

## 2025-01-07 NOTE — TELEPHONE ENCOUNTER
Zan, MD Gordo Weaver, Alyson BOO, RN1 hour ago (7:34 AM)     Continue amlodipine.  Watch blood pressures 1 week and call back.    We can decide whether to start hydralazine after she has been on amlodipine for a week     =================================================    Patient called to inform of Dr. Zuluaga's above recommendation. No answer, VM left asking patient to please return call to team 2 nurse line.     ADDENDUM 1140 - Patient returned call and reports she is in ED and waiting on a hospital bed. Patient informed of above message from Dr. Zuluaga, however management of care and medications will be taken over by inpatient providers.

## 2025-01-07 NOTE — PHARMACY-VANCOMYCIN DOSING SERVICE
Pharmacy Vancomycin Initial Note  Date of Service 2025  Patient's  1950  74 year old, female    Indication: Sepsis    Current estimated CrCl = Estimated Creatinine Clearance: 29.8 mL/min (A) (based on SCr of 1.81 mg/dL (H)).    Creatinine for last 3 days  2025:  7:42 AM Creatinine 1.68 mg/dL  2025:  7:21 AM Creatinine 1.81 mg/dL    Recent Vancomycin Level(s) for last 3 days  No results found for requested labs within last 3 days.      Vancomycin IV Administrations (past 72 hours)        No vancomycin orders with administrations in past 72 hours.                    Nephrotoxins and other renal medications (From now, onward)      None            Contrast Orders - past 72 hours (72h ago, onward)      None            Insight[employer] Prediction of Planned Initial Vancomycin Regimen  Loading dose: 1500 mg at 23:30 2025.  Regimen: 1000 mg IV every 24 hours.  Start time: 23:30 on 2025  Exposure target: AUC24 (range)400-600 mg/L.hr   AUC24,ss: 573 mg/L.hr  Probability of AUC24 > 400: 85 %  Ctrough,ss: 19.3 mg/L  Probability of Ctrough,ss > 20: 46 %  Probability of nephrotoxicity (Lodise VERA ): 16 %          Plan:  Start vancomycin  1000 mg IV q24h.   Vancomycin monitoring method: AUC  Vancomycin therapeutic monitoring goal: 400-600 mg*h/L  Pharmacy will check vancomycin levels as appropriate in 1-3 Days.    Serum creatinine levels will be ordered daily for the first week of therapy and at least twice weekly for subsequent weeks.      Antonio Weiss, [facility]

## 2025-01-07 NOTE — ED NOTES
Bed: ED34  Expected date:   Expected time:   Means of arrival:   Comments:  Boarder-3 READY for pt, bed pulled

## 2025-01-07 NOTE — PROGRESS NOTES
SLP consult received and evaluation attempted. This patient refused participation in a swallowing evaluation. Per patient request SLP consult is being discontinued.

## 2025-01-07 NOTE — PHARMACY-ADMISSION MEDICATION HISTORY
Pharmacist Admission Medication History    Admission medication history is complete. The information provided in this note is only as accurate as the sources available at the time of the update.    Information Source(s): Patient and CareEverywhere/SureScripts via in-person    Pertinent Information:     Changes made to PTA medication list:  Added: None  Deleted: None  Changed: iron        Medication History Completed By: No Bean RPH 1/7/2025 9:12 AM    PTA Med List   Medication Sig Note Last Dose/Taking    amLODIPine (NORVASC) 5 MG tablet Take 0.5 tablets (2.5 mg) by mouth daily.  1/6/2025 Morning    apixaban ANTICOAGULANT (ELIQUIS ANTICOAGULANT) 2.5 MG tablet Take 1 tablet (2.5 mg) by mouth 2 times daily.  1/5/2025 Evening    calcitRIOL (ROCALTROL) 0.25 MCG capsule Take 1 capsule (0.25 mcg) by mouth daily.  1/5/2025 Morning    D-MANNOSE PO Take 1 Scoop by mouth 2 times daily.  Past Week    EPINEPHrine (ANY BX GENERIC EQUIV) 0.3 MG/0.3ML injection 2-pack Inject 0.3 mLs (0.3 mg) into the muscle as needed for anaphylaxis. May repeat one time in 5-15 minutes if response to initial dose is inadequate.  Taking As Needed    estradiol (ESTRACE) 0.1 MG/GM vaginal cream Place vaginally every other day.  1/5/2025 Evening    evolocumab (REPATHA SURECLICK) 140 MG/ML prefilled autoinjector Inject 1 mL (140 mg) subcutaneously every 14 days. . Please have fasting labs drawn for further refills. 1/7/2025: Was due 1/6/25 - overdue Past Month    ezetimibe (ZETIA) 10 MG tablet Take 1 tablet (10 mg) by mouth daily.  1/5/2025 Evening    Ferrous Sulfate 324 MG TBEC Take 1 tablet by mouth 2 times daily as needed.  Past Month    folic acid (FOLVITE) 1 MG tablet Take 1 tablet (1 mg) by mouth daily.  1/5/2025 Morning    gabapentin (NEURONTIN) 250 MG/5ML solution Take 6 mLs (300 mg) by mouth at bedtime  Past Week    glucose (BD GLUCOSE) 5 g chewable tablet Take 2 tablets (10 g) by mouth as needed (low blood sugar)  Taking As Needed     hypromellose (ARTIFICIAL TEARS) 0.4 % SOLN ophthalmic solution Apply 1 drop to eye every hour as needed for dry eyes  More than a month    ketoconazole (NIZORAL) 2 % external cream Apply topically 2 times daily as needed (redness and flaking). Apply to the face.  1/6/2025 Morning    ketoconazole (NIZORAL) 2 % external shampoo Apply topically three times a week. Lather in the shower, wait 3-5 minutes before rinsing.  Past Week    levothyroxine (SYNTHROID/LEVOTHROID) 175 MCG tablet Take 1 tablet (175 mcg) by mouth daily.  1/6/2025 Morning    linagliptin (TRADJENTA) 5 MG TABS tablet Take 1 tablet (5 mg) by mouth daily.  1/5/2025 Morning    meclizine (ANTIVERT) 25 MG tablet Take 1 tablet (25 mg) by mouth as needed for dizziness or other (migraines)  Past Month    metoprolol succinate ER (TOPROL XL) 25 MG 24 hr tablet Take 1 tablet (25 mg) by mouth daily. Take an extra tablet daily as needed for breakthrough afib. May repeat in two hours if heart rate remains >100bpm (no more than 4 tablets per day).  1/6/2025 Morning    Multiple Vitamins-Minerals (PRESERVISION AREDS 2) CAPS Take 1 capsule by mouth 2 times daily  1/5/2025 Evening    Nutrisource Fiber PO packet Take 1 packet by mouth daily.  1/6/2025 Morning    omega-3 acid ethyl esters (LOVAZA) 1 g capsule Take 1 capsule by mouth 2 times daily  1/5/2025 Evening    predniSONE (DELTASONE) 10 MG tablet Take 1 tablet (10 mg) by mouth daily.  1/5/2025 Morning    sirolimus (GENERIC EQUIVALENT) 1 MG tablet Take 1 tablet (1 mg) by mouth daily.  1/6/2025 Morning    ursodiol (ACTIGALL) 250 MG tablet Take 1 tablet (250 mg) by mouth 2 times daily.  1/5/2025 Evening

## 2025-01-07 NOTE — TELEPHONE ENCOUNTER
"Nurse Triage SBAR    Is this a 2nd Level Triage? NO    Situation: Fever and \"I need to be readmitted.\"    Background: Patient discharged from hospital yesterday, and says she shouldn't have been. Immunocompromised.    Assessment: Reports she is running a fever, is sick and lethargic.  102.6, 101.5 after Tylenol. \"I can't sit in the ED, I'd rsather die.\" Patient is demanding the on-call provider directly admit her. Patient says she is having severe trouble breathing.    Protocol Recommended Disposition:   Call  Now    Recommendation: Writer explained the on-call can't admit her directly, but would need to be assessed first. Patient refuses disposition because last time she had to wait so long in the ED.  She said she would call the clinic in the morning \"if I'm still alive.\"    Rona Salgado RN  [facility]    Reason for Disposition   SEVERE difficulty breathing (e.g., struggling for each breath, speaks in single words)    Protocols used: Cough - Acute Non-Productive-A-AH    "

## 2025-01-07 NOTE — TELEPHONE ENCOUNTER
1/7 Left Voicemail (1st Attempt) and Sent Mychart (1st Attempt) for the patient to call back and schedule the following:    Appointment type: Return Dermatology  Provider: Johnnie  Return date: 1/14/2025  Specialty phone number: [phone]  Additional appointment(s) needed: n/a  Additonal Notes: Biopsy

## 2025-01-07 NOTE — H&P
"[facility]    History and Physical - Hospitalist Service       Date of Admission:  1/6/2025    Assessment & Plan   74-year-old female with a significant past medical history, including primary biliary cirrhosis post-liver transplant (2002) on chronic immunosuppression, atrial fibrillation on anticoagulation, prior cerebrovascular accident (CVA), chronic kidney disease (CKD) stage III, and type 2 diabetes mellitus HLD, HTN and PAD. She was admitted with fever and generalized weakness.  She is immunosuppressed being on Prednisone and Sirolimus and presents to the ED again for fever.   She was just recently admitted to the hospital for fever on January 3-5 and was discharged with negative blood cultures, negative viral panel and negative respiratory panel.       Since her admission in the ER at [facility], she is afebrile, not tachycardic, stable blood pressures in the 100s and normal oxygen saturations.  BMP ishows normal electrolyte,  with anion gap  metabolic acidosis.  Creatinine is 1.33 and  improved from recent admissions.  Procalcitonin level is 0.10, normal lactic acid, .   VBG shows pH 7.50, PCO2 27, PO2 37.  WBC is 9.2, Hgb 10.5, .  Bcx were ordered, viral panel is negative. CXR shows new airspace disease in RLL compatible for pneumonia or aspiration.  She was started on cefepime for just a few minutes when she felt numbness in her mouth and felt intolerant to this antibiotic.  If is noted she has 20 \"allergies\" listed.  Her Cefepime infusion was started for only a few minutes with plans now to start Meropenem.     ## Fever  ## Right lowe lobe infiltrate aspiration vs HAP ( this was noted on CT scan from 12/10/24)  Patient presents for the 2nd time in less than a week with fever and prior admission work up was largely negative with continued negative blood cultures earlier.  CXR shows new airspace disease in RLL but there was mention of small ground glass opacities on the right " "lower lobe.  Admit to inpatient  Patient has redrawn blood cultures, check Nasal for MRSA  She has been started on vancomycin and Cefepime initially  Possible reaction to Cefepime, patient changed to Meropenem  Speech evaluation, patient does have cough but no overt choking    ## Bilateral LE erythema  ## Venous stasis changes vs cellulitis  Appearance can be due to stasis dermatitis given its symmetry but in setting of ongoing fever, will monitor and treat for soft tissue infection as well  Continue Vancomycin and Meropenem    ## History of PBC  ## S/P liver transplant  Continue Urosdiol  Continue Prednisone  Continue Sirolimus, check levels    ## Hx of DVT  Continue apixaban    ## PAF  * continue apixaban  Continue Toprol XL 25 mg daily    ## Hypothyroidism  Continue Synthroid replacement    ## DM type II  Continue linagliptin  Mod CHO diet  Accucheck BID    Diet:  Mod CHO  DVT Prophylaxis: DOAC  Ron Catheter: Not present  Lines: None     Cardiac Monitoring: None  Code Status:  Full    Clinically Significant Risk Factors Present on Admission          # Hyperchloremia: Highest Cl = 109 mmol/L in last 2 days, will monitor as appropriate      # Hypocalcemia: Lowest Ca = 8.5 mg/dL in last 2 days, will monitor and replace as appropriate    # Anion Gap Metabolic Acidosis: Highest Anion Gap = 19 mmol/L in last 2 days, will monitor and treat as appropriate   # Drug Induced Coagulation Defect: home medication list includes an anticoagulant medication    # Hypertension: Noted on problem list      # Anemia: based on hgb <11      # DMII: A1C = 6.9 % (Ref range: <5.7 %) within past 6 months    # Overweight: Estimated body mass index is 28.66 kg/m  as calculated from the following:    Height as of 1/4/25: 1.702 m (5' 7\").    Weight as of 1/4/25: 83 kg (183 lb).              Disposition Plan     Medically Ready for Discharge: Anticipated in 2-4 Days        Will Schaefer MD  Hospitalist Service  [facility]  Securely message with [employer] (more info)  Text page via [facility] Paging/Directory     ______________________________________________________________________    Chief Complaint   Fever    History is obtained from the patient, electronic health record, and emergency department physician    History of Present Illness   Luz Thompson is a 74 year old female on Eliquis with history of a-fib, CKD, CVA, diabetes, DVT, HLD, HTN, and PAD who presents to the ED for fever.  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  She is chronically immunocompromised.  She was recently admitted to the hospital on January 3-5 and was discharged with negative blood cultures, negative viral panel and negative respiratory panel.  She was having fever at that time.  She initially received cefepime and vancomycin for broad-spectrum antibiotic coverage.  She had a brief hypotensive episode and her troponin bumped which was thought to be type II.  The patient says she has had a dry cough for the last several days.  The patient was feeling improved yesterday and went home to her independent living.  Today she had a fever to 102.6 and chills.  She denies any dizziness.  She is noted to have erythema of the lower extremities that she says is chronic but worse now.  She took 1 g of Tylenol prior to coming into the ED.  She denies any dysuria or increased urinary frequency.  No vomiting or diarrhea.  No chest pain.  She says she is still coughing but is not having nasal congestion.  No specific known ill contacts.     Since her admission in the ER at [facility], she is afebrile, not tachycardic, stable blood pressures in the 100s and normal oxygen saturations.  BMP ishows normal electrolyte,  with anion gap  metabolic acidosis.  Creatinine is 1.33 and  improved from recent admissions.  Procalcitonin level is 0.10, normal lactic acid, .   VBG shows pH 7.50, PCO2 27, PO2 37.  WBC is 9.2, Hgb 10.5, PLT  "437.  Bcx were ordered, viral panel is negative. CXR shows new airspace disease in RLL compatible for pneumonia or aspiration.  She was started on cefepime for just a few minutes when she felt numbness in her mouth and felt intolerant to this antibiotic.  If is noted she has 20 \"allergies\" listed.  Her Cefepime infusion was started for only a few minutes with plans now to start Meropenem.     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       Past Surgical History   Past Surgical History:   Procedure Laterality Date    APPENDECTOMY  1961    CATARACT IOL, RT/LT      RE12/19/2013, LE12/10/2013 - Toric " lenses    CHOLECYSTECTOMY  1991    COLONOSCOPY  03/10/2014    Procedure: COLONOSCOPY;;  Surgeon: Gentry Ramirez MD;  Location: [facility] GI    CYSTOSCOPY      ear drum repair      ENDOBRONCHIAL ULTRASOUND FLEXIBLE N/A 09/29/2017    Procedure: ENDOBRONCHIAL ULTRASOUND FLEXIBLE;  Flexible Bronchoscopy, Endobronchial Ultrasound, Transbronchial Needle Aspiration ;  Surgeon: Eden Clinton MD;  Location: [facility] OR    ENDOSCOPIC RETROGRADE CHOLANGIOPANCREATOGRAM  09/19/2013    Procedure: ENDOSCOPIC RETROGRADE CHOLANGIOPANCREATOGRAM;  Endoscopic Retrograde Cholangiopancreatogram with single balloon enteroscopy, ballon sweep of bile duct;  Surgeon: Brett Membreno MD;  Location: [facility] OR     KNEE SCOPE,MED/LAT MENISECTOMY Right 08/10/2012    partial medial menisectomy only    KNEE SURGERY  1966    R knee    PICC INSERTION  09/18/2013    4fr SL PASV PICC, 40cm (1cm external) in the R basilic vein w/ tip in the low SVC    PICC INSERTION  02/21/2014    5 fr DL BioFlo Navilyst PICC, 46 cm (3 cm external) in the L basilic vein w/ tip in the SVC RA junction.    THYROIDECTOMY  03/2010    TRANSPLANT LIVER RECIPIENT LIVING UNRELATED  05/2002       Prior to Admission Medications   Prior to Admission Medications   Prescriptions Last Dose Informant Patient Reported? Taking?   Continuous Glucose Sensor (FREESTYLE NINO 2 SENSOR) MISC   No No   Sig: Change every 14 days.   D-MANNOSE PO   Yes No   Sig: Take 1 Scoop by mouth 2 times daily.   EPINEPHrine (ANY BX GENERIC EQUIV) 0.3 MG/0.3ML injection 2-pack   No No   Sig: Inject 0.3 mLs (0.3 mg) into the muscle as needed for anaphylaxis. May repeat one time in 5-15 minutes if response to initial dose is inadequate.   Ferrous Sulfate 324 MG TBEC   Yes No   Sig: Take 1 tablet by mouth daily.   Multiple Vitamins-Minerals (PRESERVISION AREDS 2) CAPS   No No   Sig: Take 1 capsule by mouth 2 times daily   Nutrisource Fiber PO packet   Yes No   Sig: Take 1 packet by mouth daily.   amLODIPine (NORVASC) 5 MG  tablet   No No   Sig: Take 0.5 tablets (2.5 mg) by mouth daily.   apixaban ANTICOAGULANT (ELIQUIS ANTICOAGULANT) 2.5 MG tablet   No No   Sig: Take 1 tablet (2.5 mg) by mouth 2 times daily.   calcitRIOL (ROCALTROL) 0.25 MCG capsule   No No   Sig: Take 1 capsule (0.25 mcg) by mouth daily.   estradiol (ESTRACE) 0.1 MG/GM vaginal cream   Yes No   Sig: Place vaginally every other day.   evolocumab (REPATHA SURECLICK) 140 MG/ML prefilled autoinjector   No No   Sig: Inject 1 mL (140 mg) subcutaneously every 14 days. . Please have fasting labs drawn for further refills.   ezetimibe (ZETIA) 10 MG tablet   No No   Sig: Take 1 tablet (10 mg) by mouth daily.   folic acid (FOLVITE) 1 MG tablet   No No   Sig: Take 1 tablet (1 mg) by mouth daily.   gabapentin (NEURONTIN) 250 MG/5ML solution   No No   Sig: Take 6 mLs (300 mg) by mouth at bedtime   glucose (BD GLUCOSE) 5 g chewable tablet   No No   Sig: Take 2 tablets (10 g) by mouth as needed (low blood sugar)   hypromellose (ARTIFICIAL TEARS) 0.4 % SOLN ophthalmic solution   No No   Sig: Apply 1 drop to eye every hour as needed for dry eyes   ketoconazole (NIZORAL) 2 % external cream   No No   Sig: Apply topically 2 times daily as needed (redness and flaking). Apply to the face.   ketoconazole (NIZORAL) 2 % external shampoo   No No   Sig: Apply topically three times a week. Lather in the shower, wait 3-5 minutes before rinsing.   levothyroxine (SYNTHROID/LEVOTHROID) 175 MCG tablet   No No   Sig: Take 1 tablet (175 mcg) by mouth daily.   linagliptin (TRADJENTA) 5 MG TABS tablet   No No   Sig: Take 1 tablet (5 mg) by mouth daily.   meclizine (ANTIVERT) 25 MG tablet   No No   Sig: Take 1 tablet (25 mg) by mouth as needed for dizziness or other (migraines)   metoprolol succinate ER (TOPROL XL) 25 MG 24 hr tablet   Yes No   Sig: Take 1 tablet (25 mg) by mouth daily. Take an extra tablet daily as needed for breakthrough afib. May repeat in two hours if heart rate remains >100bpm (no  more than 4 tablets per day).   omega-3 acid ethyl esters (LOVAZA) 1 g capsule   No No   Sig: Take 1 capsule by mouth 2 times daily   predniSONE (DELTASONE) 10 MG tablet   No No   Sig: Take 1 tablet (10 mg) by mouth daily.   sirolimus (GENERIC EQUIVALENT) 1 MG tablet   No No   Sig: Take 1 tablet (1 mg) by mouth daily.   ursodiol (ACTIGALL) 250 MG tablet   No No   Sig: Take 1 tablet (250 mg) by mouth 2 times daily.      Facility-Administered Medications: None           Physical Exam   Vital Signs: Temp: 99.1  F (37.3  C) Temp src: Oral BP: 106/74 Pulse: 85   Resp: 12 SpO2: 98 % O2 Device: Nasal cannula Oxygen Delivery: 2 LPM  Weight: 0 lbs 0 oz    General Appearance: NAD, non toxic  Respiratory: CTA  Cardiovascular: RRR  GI: soft, NT +BS  Skin: non pitting LE edema, bilateral LE erythema, warmth shins  Other: Moves all 4 ext and CN intact     Medical Decision Making       61 MINUTES SPENT BY ME on the date of service doing chart review, history, exam, documentation & further activities per the note.      Data     I have personally reviewed the following data over the past 24 hrs:    9.2  \   10.5 (L)   / 437     139 104 40.4 (H) /  144 (H)   4.5 16 (L) 1.33 (H) \     ALT: N/A AST: N/A AP: 149 TBILI: 0.3   ALB: 3.5 TOT PROTEIN: 7.1 LIPASE: N/A     Procal: 0.10 CRP: N/A Lactic Acid: 1.4         Imaging results reviewed over the past 24 hrs:   Recent Results (from the past 24 hours)   XR Chest 2 Views    Narrative    EXAM: XR CHEST 2 VIEWS  LOCATION: [facility]  DATE: 1/6/2025    INDICATION: fever  COMPARISON: 1/3/2025      Impression    IMPRESSION: New airspace disease in the right lower lobe compatible with pneumonia or aspiration. Clinical correlation and follow-up to resolution recommended. Heart size is normal. Implanted monitoring device over the left chest. No pneumothorax. No   acute bony abnormalities.

## 2025-01-07 NOTE — ED TRIAGE NOTES
arr via EMS. pt recently sent home from hospital. Had fever of unknown origin. Immunocompromised. Today fever returned. Tmax 102.  en route.

## 2025-01-07 NOTE — ED PROVIDER NOTES
Emergency Department Note      History of Present Illness     Chief Complaint   Fever      HPI   Luz Thompson is a 74 year old female on [facility] with history of a-fib, CKD, CVA, diabetes, DVT, HLD, HTN, and PAD who presents to the ED for fever.  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  She is chronically immunocompromised.  She was recently admitted to the hospital on January 3 and was discharged on the fifth, yesterday.  She was having fever at that time.  She initially received cefepime and vancomycin for broad-spectrum antibiotic coverage.  She had a brief hypotensive episode and her troponin bumped which was thought to be type II.  The patient says she has had a dry cough for the last several days.  The patient was feeling improved yesterday and went home to her independent living.  Today she had a fever to 102.6 and chills.  She denies any dizziness.  She is noted to have erythema of the lower extremities that she says is chronic but worse now.  She took 1 g of Tylenol prior to coming into the ED.  She denies any dysuria or increased urinary frequency.  No vomiting or diarrhea.  No chest pain.  She says she is still coughing but is not having nasal congestion.  No specific known ill contacts.    EMS who report the patient's fever and general stability en route.  Independent Historian       Review of External Notes   Discharge summary reviewed from yesterday.  The patient had a fever and was thought to likely have an upper respiratory infection.    Past Medical History     Medical History and Problem List   DERREK  Anemia  Anisometropia  Anxiety  Astigmatism  Atrial fibrillation paroxysmal   Atopic dermatitis  Biliary cirrhosis  Carcinoma papillary thyroid  Cataract B/L  Cellulitis  Chalazion upper eyelid L  CKD stage IIIb  Coccidioidomycosis   CVA  Cystitis acute  Diabetes mellitus type II  Diabetic nephropathy proteinuric  Diastolic heart failure  chronic  Diverticulosis  DVT  EBV  Eczema   GERD  Glaucoma  Hyperlipidemia  Hypertension   Hypothyroidism postoperative  Kidney injury acute  Lung infection  Macular degeneration nonexudative   Major depressive disorder   Migraine  Neuropathy peripheral diabetic  Onychomycosis toenails  RASHIDA  Osteoarthritis knee R  Osteomyelitis  Osteoporosis  PAD  Presbyopia  Purpura senile  Pyelonephritis   Rhabdomyolysis non-traumatic  Sepsis   Sensorineural hearing loss  Sjogren's syndrome  Squamous cell carcinoma  Tear rotator cuff L  Tendonitis shoulder R  Thyroid cancer     Medications   apixaban  evolocumab  ezetimibe  furosemide  gabapentin  levothyroxine  linagliptin   losartan  meclizine  metoprolol succinate  prednisone  sirolimus   ursodiol      Surgical History   Appendectomy  Cataract extraction & IOL implant B/L  Cholecystectomy  PICC insertion  Thyroidectomy  Transplant liver  Tympanoplasty  Varicose vein removal    Physical Exam     Patient Vitals for the past 24 hrs:   BP Temp Temp src Pulse Resp SpO2   01/07/25 0400 94/57 -- -- 68 -- 99 %   01/07/25 0300 -- -- -- 72 17 97 %   01/07/25 0225 -- -- -- 76 16 98 %   01/07/25 0215 -- -- -- 79 18 90 %   01/07/25 0200 110/54 -- -- 81 15 98 %   01/07/25 0154 111/53 -- -- 88 19 98 %   01/07/25 0152 125/64 -- -- 105 19 98 %   01/07/25 0144 115/54 -- -- 83 18 99 %   01/07/25 0140 103/59 -- -- 89 20 99 %   01/07/25 0134 119/51 -- -- 93 17 98 %   01/07/25 0129 105/57 -- -- 90 16 99 %   01/07/25 0122 96/54 -- -- 75 16 99 %   01/07/25 0107 112/57 -- -- 76 17 99 %   01/07/25 0052 116/55 -- -- 80 12 100 %   01/07/25 0030 106/74 -- -- 85 -- 98 %   01/07/25 0019 105/54 -- -- 85 12 98 %   01/07/25 0015 -- -- -- -- -- 98 %   01/07/25 0012 -- -- -- 83 24 (!) 88 %   01/07/25 0002 112/54 -- -- 85 14 94 %   01/06/25 2347 108/58 -- -- 87 -- 96 %   01/06/25 2315 125/64 -- -- 92 12 95 %   01/06/25 2300 (!) 106/93 99.1  F (37.3  C) Oral 96 17 94 %     Physical Exam  Constitutional:        General: She is not in acute distress.     Appearance: Normal appearance. She is not diaphoretic.   HENT:      Head: Atraumatic.      Right Ear: Tympanic membrane, ear canal and external ear normal.      Left Ear: Tympanic membrane, ear canal and external ear normal.      Mouth/Throat:      Mouth: Mucous membranes are dry.      Pharynx: No oropharyngeal exudate or posterior oropharyngeal erythema.   Eyes:      General: No scleral icterus.     Conjunctiva/sclera: Conjunctivae normal.   Cardiovascular:      Rate and Rhythm: Normal rate and regular rhythm.      Heart sounds: Normal heart sounds.   Pulmonary:      Effort: No respiratory distress.      Breath sounds: Normal breath sounds.   Abdominal:      General: Abdomen is flat. There is no distension.      Tenderness: There is no abdominal tenderness.   Musculoskeletal:      Cervical back: Neck supple.      Right lower leg: Edema present.      Left lower leg: Edema present.   Skin:     General: Skin is warm.      Capillary Refill: Capillary refill takes less than 2 seconds.      Comments: Bilateral lower extremity erythema with warmth.  No tenderness or crepitance.  Perfusion of the feet is normal.   Neurological:      General: No focal deficit present.      Mental Status: She is alert and oriented to person, place, and time.   Psychiatric:         Mood and Affect: Mood normal.         Behavior: Behavior normal.       Diagnostics     Lab Results   Labs Ordered and Resulted from Time of ED Arrival to Time of ED Departure   ISTAT GASES LACTATE VENOUS POCT - Abnormal       Result Value    Lactic Acid POCT 1.4      Bicarbonate Venous POCT 21      O2 Sat, Venous POCT 77 (*)     pCO2 Venous POCT 27 (*)     pH Venous POCT 7.50 (*)     pO2 Venous POCT 37     COMPREHENSIVE METABOLIC PANEL - Abnormal    Sodium 139      Potassium 4.5      Carbon Dioxide (CO2) 16 (*)     Anion Gap 19 (*)     Urea Nitrogen 40.4 (*)     Creatinine 1.33 (*)     GFR Estimate 42 (*)     Calcium 9.1       Chloride 104      Glucose 144 (*)     Alkaline Phosphatase 149      AST        ALT        Protein Total 7.1      Albumin 3.5      Bilirubin Total 0.3     TROPONIN T, HIGH SENSITIVITY - Abnormal    Troponin T, High Sensitivity 55 (*)    CBC WITH PLATELETS AND DIFFERENTIAL - Abnormal    WBC Count 9.2      RBC Count 3.66 (*)     Hemoglobin 10.5 (*)     Hematocrit 32.2 (*)     MCV 88      MCH 28.7      MCHC 32.6      RDW 15.8 (*)     Platelet Count 437      % Neutrophils 73      % Lymphocytes 11      % Monocytes 11      % Eosinophils 1      % Basophils 1      % Immature Granulocytes 4      NRBCs per 100 WBC 0      Absolute Neutrophils 6.7      Absolute Lymphocytes 1.0      Absolute Monocytes 1.0      Absolute Eosinophils 0.1      Absolute Basophils 0.1      Absolute Immature Granulocytes 0.4      Absolute NRBCs 0.0     TROPONIN T, HIGH SENSITIVITY - Abnormal    Troponin T, High Sensitivity 55 (*)    INFLUENZA A/B, RSV AND SARS-COV2 PCR - Normal    Influenza A PCR Negative      Influenza B PCR Negative      RSV PCR Negative      SARS CoV2 PCR Negative     PROCALCITONIN - Normal    Procalcitonin 0.10     ROUTINE UA WITH MICROSCOPIC REFLEX TO CULTURE   BASIC METABOLIC PANEL   CBC WITH PLATELETS   MRSA MSSA PCR, NASAL SWAB    MRSA Target DNA Negative      SA Target DNA Negative     BLOOD CULTURE   BLOOD CULTURE       Imaging   XR Chest 2 Views   Final Result   IMPRESSION: New airspace disease in the right lower lobe compatible with pneumonia or aspiration. Clinical correlation and follow-up to resolution recommended. Heart size is normal. Implanted monitoring device over the left chest. No pneumothorax. No    acute bony abnormalities.          EKG   ECG taken at 2313, ECG read at 2318  Normal sinus rhythm  Normal ECG    No significant change as compared to prior, dated 01/03/2025.  Rate 90 bpm. VT interval 176 ms. QRS duration 78 ms. QT/QTc 352/430 ms. P-R-T axes 25 8 39.    Independent Interpretation   Chest x-ray  dependently interpreted.  There is an infiltrate.    ED Course      Medications Administered   Medications   apixaban ANTICOAGULANT (ELIQUIS) tablet 2.5 mg (has no administration in time range)   calcitRIOL (ROCALTROL) capsule 0.25 mcg (has no administration in time range)   ezetimibe (ZETIA) tablet 10 mg (has no administration in time range)   folic acid (FOLVITE) tablet 1 mg (has no administration in time range)   gabapentin (NEURONTIN) solution 300 mg (has no administration in time range)   levothyroxine (SYNTHROID/LEVOTHROID) tablet 175 mcg (has no administration in time range)   linagliptin (TRADJENTA) tablet 5 mg (has no administration in time range)   metoprolol succinate ER (TOPROL XL) 24 hr tablet 25 mg (has no administration in time range)   PreserVision AREDS 2 CAPS 1 capsule (has no administration in time range)   predniSONE (DELTASONE) tablet 10 mg (has no administration in time range)   sirolimus (GENERIC EQUIVALENT) tablet 1 mg (has no administration in time range)   ursodiol (ACTIGALL) tablet 250 mg (has no administration in time range)   lidocaine 1 % 0.1-1 mL (has no administration in time range)   lidocaine (LMX4) cream (has no administration in time range)   sodium chloride (PF) 0.9% PF flush 3 mL (3 mLs Intracatheter Not Given 1/7/25 0254)   sodium chloride (PF) 0.9% PF flush 3 mL (has no administration in time range)   senna-docusate (SENOKOT-S/PERICOLACE) 8.6-50 MG per tablet 1 tablet (has no administration in time range)     Or   senna-docusate (SENOKOT-S/PERICOLACE) 8.6-50 MG per tablet 2 tablet (has no administration in time range)   calcium carbonate (TUMS) chewable tablet 1,000 mg (has no administration in time range)   Patient is already receiving anticoagulation with heparin, enoxaparin (LOVENOX), warfarin (COUMADIN)  or other anticoagulant medication (has no administration in time range)   lactated ringers infusion ( Intravenous $New Bag 1/7/25 0258)   acetaminophen (TYLENOL) tablet  650 mg (has no administration in time range)     Or   acetaminophen (TYLENOL) Suppository 650 mg (has no administration in time range)   ondansetron (ZOFRAN ODT) ODT tab 4 mg (has no administration in time range)     Or   ondansetron (ZOFRAN) injection 4 mg (has no administration in time range)   diphenhydrAMINE (BENADRYL) capsule 25 mg ( Oral See Alternative 1/7/25 0341)     Or   diphenhydrAMINE (BENADRYL) injection 25 mg (25 mg Intravenous $Given 1/7/25 0341)   ceFEPIme (MAXIPIME) 2 g vial to attach to  mL bag for ADULTS or NS 50 mL bag for PEDS (has no administration in time range)   vancomycin (VANCOCIN) 1,000 mg in 200 mL dextrose intermittent infusion (has no administration in time range)   ceFEPIme (MAXIPIME) 2 g vial to attach to  mL bag for ADULTS or NS 50 mL bag for PEDS (0 g Intravenous Stopped 1/7/25 0014)   vancomycin (VANCOCIN) 1,500 mg in 0.9% NaCl 265 mL intermittent infusion (1,500 mg Intravenous $New Bag 1/7/25 0341)   diphenhydrAMINE (BENADRYL) injection 25 mg (25 mg Intravenous $Given 1/7/25 0029)   meropenem (MERREM) 1 g vial to attach to  mL bag (0 g Intravenous Stopped 1/7/25 0258)       Discussion of Management   Admitting Hospitalist, Dr. Schaefer    ED Course   ED Course as of 01/07/25 0558   Mon Jan 06, 2025   2303 I evaluated the patient and obtained history.    Tue Jan 07, 2025   0027 I rechecked and updated the patient.    0102 I spoke with Dr. Schaefer of the hospitalist service regarding admission.       Medical Decision Making / Diagnosis     RAMÍREZ Thompson is a 74 year old female who presents to the ED for evaluation of fever.  She was recently mated to the hospital and felt to have likely URI.  Today her fever has recurred.  X-ray demonstrates pneumonia.  She was able to tolerate cefepime and vancomycin when she was here previously.  This was given.  After starting the cefepime the patient felt that she had tingling of her tongue and lips.  I reevaluated  her.  There is no swelling or airway compromise.  She had Benadryl.  We will use meropenem instead.    The patient does not meet septic shock criteria.  She is not hypotensive and her lactate is normal.  She will be hydrated.  Given her potential for volume overload, goal volume of IV fluids is 1 L saline.  On reevaluation following the liter, the patient remains alert without declined.      She will be admitted to the hospital service.    Disposition   The patient was admitted to the hospital.     Diagnosis     ICD-10-CM    1. Pneumonia due to infectious organism, unspecified laterality, unspecified part of lung  J18.9                Scribe Disclosure:  IKatherine, am serving as a scribe at 11:36 PM on 1/6/2025 to document services personally performed by Antonio Alonzo MD based on my observations and the provider's statements to me.        Antonio Alonzo MD  01/07/25 0558

## 2025-01-07 NOTE — ED NOTES
[facility]    ED Boarding Nurse Handoff Addendum Report:    Date/time: 1/7/2025, 6:50 AM    Activity Level: walker    Fall Risk: Yes:  nonskid shoes/slippers when out of bed, patient and family education, and activity supervised    Active Infusions: LR @ 75 ml/hr    Current Meds Due: see MAR    Current care needs: IVF, oxygen therapy, IV antibiotics    Oxygen requirements (liters/min and/or FiO2): 2 LPM    Respiratory status: Nasal cannula    Vital signs (within last 30 minutes):    Vitals:    01/07/25 0215 01/07/25 0225 01/07/25 0300 01/07/25 0400   BP:    94/57   Pulse: 79 76 72 68   Resp: 18 16 17    Temp:       TempSrc:       SpO2: 90% 98% 97% 99%       Focused assessment within last 30 minutes:    A/Ox4. VSS on 2 L O2 via NC. C/o chronic arthritic pain. Not oob during time of care but uses a walker at BL. Purewick in place, due to void.     ED Boarding Nurse name: Rach Davison RN

## 2025-01-07 NOTE — ED NOTES
Pt states she is having numbness in her lips a couple minutes after starting antibiotics. Antibiotics stopped and line flushed. MD notified. VSS. Benadryl administered per MD order.

## 2025-01-07 NOTE — PROGRESS NOTES
Brief hospitalist update note:  74-year-old female with a significant past medical history, including primary biliary cirrhosis post-liver transplant (2002) on chronic immunosuppression, atrial fibrillation on anticoagulation, prior cerebrovascular accident (CVA), chronic kidney disease (CKD) stage III, and type 2 diabetes mellitus HLD, HTN and PAD. She was admitted with fever and generalized weakness.  She is immunosuppressed being on Prednisone and Sirolimus and presented to the ED again for fever.   She was just recently admitted to the hospital for fever on January 3-5 and was discharged with negative blood cultures, negative viral panel and negative respiratory panel.       So far this admission has remained afebrile, nontachycardic, stable BPs, maintaining O2 sats.  Chest x-ray showed new airspace disease in the right lower lobe, likely representing pneumonia or aspiration.  Initially started on cefepime, but had some numbness in her mouth and lips, so switched to meropenem for now.    Overall condition is improved, states that she is feeling better.  Will likely need to complete treatment for pneumonia.  May ask infectious disease for help with discharge antibiotic planning given patient's immunocompromise status and numerous allergies, including several to antibiotics.      Jose Renee MD

## 2025-01-07 NOTE — ED NOTES
[facility] [place]  ED Nurse Handoff Report    ED Chief complaint: Fever  . ED Diagnosis:   Final diagnoses:   Pneumonia due to infectious organism, unspecified laterality, unspecified part of lung       Allergies:   Allergies   Allergen Reactions    Fluconazole Hives and Itching     Full body hives    [See intradermal skin testing results from 8/2/2019]    Mycophenolate Diarrhea and Nausea and Vomiting     Patient stated it was chronic and lasted months      Penicillins Anaphylaxis, Hives, Itching and Rash     [See intradermal skin testing results from 8/2/2019]      Simvastatin Muscle Pain (Myalgia)     severe  Other reaction(s): Myalgia caused by statin    Methotrexate Other (See Comments)     Other reaction(s): Sore  Sores in mouth, esophagus, and stomach.       Morphine And Codeine Itching and Other (See Comments)     Psych disturbance  Other reaction(s): Confusion, Mood alteration    Quinolones Anxiety, Dizziness, Headache, Other (See Comments), Palpitations and Unknown     Other reaction(s): Hyperactive behavior, Lightheadedness, Mood alteration    Dizzy, light headed    Dizziness, shaky, and jumpy    Benadryl [Diphenhydramine Hcl]      Insomnia     Capsules, Empty Gelatin [Gelatin]     Lansoprazole Diarrhea    Azithromycin Itching     [See intradermal skin testing results from 8/2/2019]    Bactrim [Sulfamethoxazole-Trimethoprim] Other (See Comments)     Numb mouth, tingling lips (treated with anti-histamines)    Cephalosporins Itching     [See intradermal skin testing results from 8/2/2019]    Ciprofloxacin Hcl Other (See Comments) and Dizziness     Insomnia, mood lability, Irregular heart beat         Doxycycline Itching and Unknown     [See intradermal skin testing results from 8/2/2019]    Lisinopril Cough    Omeprazole Itching    Tolectin [Nsaids] Rash    Tolmetin Rash and Itching    Tramadol Rash, Hives and Itching       Code Status: Full Code    Activity level - Baseline/Home:   independent.  Activity Level - Current:   assist of 1.   Lift room needed: No.   Bariatric: No   Needed: No   Isolation: No.   Infection: Not Applicable.     Respiratory status: Nasal cannula 2L; while sleeping    Vital Signs (within 30 minutes):   Vitals:    01/07/25 0012 01/07/25 0015 01/07/25 0019 01/07/25 0030   BP:   105/54 106/74   Pulse: 83  85 85   Resp: 24  12    Temp:       TempSrc:       SpO2: (!) 88% 98% 98% 98%       Cardiac Rhythm:  ,      Pain level:    Patient confused: No.   Patient Falls Risk: patient and family education, assistive device/personal items within reach, and activity supervised.   Elimination Status:  due to void.       Patient Report - Initial Complaint: arr via EMS. pt recently sent home from hospital. Had fever of unknown origin. Immunocompromised. Today fever returned. Tmax 102.  en route.   .   Focused Assessment:    HPI   Luz Thompson is a 74 year old female on Eliquis with history of a-fib, CKD, CVA, diabetes, DVT, HLD, HTN, and PAD who presents to the ED for fever.  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  She is chronically immunocompromised.  She was recently admitted to the hospital on January 3 and was discharged on the fifth, yesterday.  She was having fever at that time.  She initially received cefepime and vancomycin for broad-spectrum antibiotic coverage.  She had a brief hypotensive episode and her troponin bumped which was thought to be type II.  The patient says she has had a dry cough for the last several days.  The patient was feeling improved yesterday and went home to her independent living.  Today she had a fever to 102.6 and chills.  She denies any dizziness.  She is noted to have erythema of the lower extremities that she says is chronic but worse now.  She took 1 g of Tylenol prior to coming into the ED.  She denies any dysuria or increased urinary frequency.  No vomiting or diarrhea.  No  chest pain.  She says she is still coughing but is not having nasal congestion.  No specific known ill contacts.     Abnormal Results:   Labs Ordered and Resulted from Time of ED Arrival to Time of ED Departure   ISTAT GASES LACTATE VENOUS POCT - Abnormal       Result Value    Lactic Acid POCT 1.4      Bicarbonate Venous POCT 21      O2 Sat, Venous POCT 77 (*)     pCO2 Venous POCT 27 (*)     pH Venous POCT 7.50 (*)     pO2 Venous POCT 37     COMPREHENSIVE METABOLIC PANEL - Abnormal    Sodium 139      Potassium 4.5      Carbon Dioxide (CO2) 16 (*)     Anion Gap 19 (*)     Urea Nitrogen 40.4 (*)     Creatinine 1.33 (*)     GFR Estimate 42 (*)     Calcium 9.1      Chloride 104      Glucose 144 (*)     Alkaline Phosphatase 149      AST        ALT        Protein Total 7.1      Albumin 3.5      Bilirubin Total 0.3     TROPONIN T, HIGH SENSITIVITY - Abnormal    Troponin T, High Sensitivity 55 (*)    CBC WITH PLATELETS AND DIFFERENTIAL - Abnormal    WBC Count 9.2      RBC Count 3.66 (*)     Hemoglobin 10.5 (*)     Hematocrit 32.2 (*)     MCV 88      MCH 28.7      MCHC 32.6      RDW 15.8 (*)     Platelet Count 437      % Neutrophils 73      % Lymphocytes 11      % Monocytes 11      % Eosinophils 1      % Basophils 1      % Immature Granulocytes 4      NRBCs per 100 WBC 0      Absolute Neutrophils 6.7      Absolute Lymphocytes 1.0      Absolute Monocytes 1.0      Absolute Eosinophils 0.1      Absolute Basophils 0.1      Absolute Immature Granulocytes 0.4      Absolute NRBCs 0.0     INFLUENZA A/B, RSV AND SARS-COV2 PCR - Normal    Influenza A PCR Negative      Influenza B PCR Negative      RSV PCR Negative      SARS CoV2 PCR Negative     PROCALCITONIN - Normal    Procalcitonin 0.10     ROUTINE UA WITH MICROSCOPIC REFLEX TO CULTURE   TROPONIN T, HIGH SENSITIVITY   BLOOD CULTURE   BLOOD CULTURE   MRSA MSSA PCR, NASAL SWAB        XR Chest 2 Views   Final Result   IMPRESSION: New airspace disease in the right lower lobe compatible  with pneumonia or aspiration. Clinical correlation and follow-up to resolution recommended. Heart size is normal. Implanted monitoring device over the left chest. No pneumothorax. No    acute bony abnormalities.          Treatments provided: labs, imaging, meds.  Family Comments: none.  OBS brochure/video discussed/provided to patient:  Yes  ED Medications:   Medications   vancomycin (VANCOCIN) 1,500 mg in 0.9% NaCl 265 mL intermittent infusion (has no administration in time range)   vancomycin (VANCOCIN) 1,000 mg in 200 mL dextrose intermittent infusion (has no administration in time range)   meropenem (MERREM) 1 g vial to attach to  mL bag (has no administration in time range)   ceFEPIme (MAXIPIME) 2 g vial to attach to  mL bag for ADULTS or NS 50 mL bag for PEDS (0 g Intravenous Stopped 1/7/25 0014)   diphenhydrAMINE (BENADRYL) injection 25 mg (25 mg Intravenous $Given 1/7/25 0029)       Drips infusing:  No  For the majority of the shift this patient was Green.   Interventions performed were none.    Sepsis treatment initiated: No    Cares/treatment/interventions/medications to be completed following ED care: per provider orders.    ED Nurse Name: Prabha Adair RN  1:13 AM    RECEIVING UNIT ED HANDOFF REVIEW    Above ED Nurse Handoff Report was reviewed: Yes  Reviewed by: Jah Hidalgo RN on January 7, 2025 at 6:11 PM   I Zachariah called the ED to inform them the note was read: Yes    Jah Hidalgo RN

## 2025-01-08 ENCOUNTER — APPOINTMENT (OUTPATIENT)
Dept: PHYSICAL THERAPY | Facility: CLINIC | Age: 75
DRG: 194 | End: 2025-01-08
Attending: INTERNAL MEDICINE
Payer: MEDICARE

## 2025-01-08 LAB
ANION GAP SERPL CALCULATED.3IONS-SCNC: 12 MMOL/L (ref 7–15)
BACTERIA BLD CULT: NO GROWTH
BACTERIA BLD CULT: NO GROWTH
BUN SERPL-MCNC: 32.4 MG/DL (ref 8–23)
CALCIUM SERPL-MCNC: 8.6 MG/DL (ref 8.8–10.4)
CHLORIDE SERPL-SCNC: 109 MMOL/L (ref 98–107)
CREAT SERPL-MCNC: 1.46 MG/DL (ref 0.51–0.95)
EGFRCR SERPLBLD CKD-EPI 2021: 37 ML/MIN/1.73M2
ERYTHROCYTE [DISTWIDTH] IN BLOOD BY AUTOMATED COUNT: 15.8 % (ref 10–15)
GLUCOSE BLDC GLUCOMTR-MCNC: 126 MG/DL (ref 70–99)
GLUCOSE BLDC GLUCOMTR-MCNC: 97 MG/DL (ref 70–99)
GLUCOSE SERPL-MCNC: 127 MG/DL (ref 70–99)
HCO3 SERPL-SCNC: 19 MMOL/L (ref 22–29)
HCT VFR BLD AUTO: 26.7 % (ref 35–47)
HGB BLD-MCNC: 8.2 G/DL (ref 11.7–15.7)
MCH RBC QN AUTO: 27.6 PG (ref 26.5–33)
MCHC RBC AUTO-ENTMCNC: 30.7 G/DL (ref 31.5–36.5)
MCV RBC AUTO: 90 FL (ref 78–100)
PLATELET # BLD AUTO: 318 10E3/UL (ref 150–450)
POTASSIUM SERPL-SCNC: 3.6 MMOL/L (ref 3.4–5.3)
RBC # BLD AUTO: 2.97 10E6/UL (ref 3.8–5.2)
SODIUM SERPL-SCNC: 140 MMOL/L (ref 135–145)
WBC # BLD AUTO: 5.9 10E3/UL (ref 4–11)

## 2025-01-08 PROCEDURE — 97530 THERAPEUTIC ACTIVITIES: CPT | Mod: GP | Performed by: PHYSICAL THERAPIST

## 2025-01-08 PROCEDURE — 97161 PT EVAL LOW COMPLEX 20 MIN: CPT | Mod: GP | Performed by: PHYSICAL THERAPIST

## 2025-01-08 PROCEDURE — 97116 GAIT TRAINING THERAPY: CPT | Mod: GP | Performed by: PHYSICAL THERAPIST

## 2025-01-08 PROCEDURE — 250N000013 HC RX MED GY IP 250 OP 250 PS 637: Performed by: STUDENT IN AN ORGANIZED HEALTH CARE EDUCATION/TRAINING PROGRAM

## 2025-01-08 PROCEDURE — 99232 SBSQ HOSP IP/OBS MODERATE 35: CPT | Performed by: STUDENT IN AN ORGANIZED HEALTH CARE EDUCATION/TRAINING PROGRAM

## 2025-01-08 PROCEDURE — 85014 HEMATOCRIT: CPT | Performed by: STUDENT IN AN ORGANIZED HEALTH CARE EDUCATION/TRAINING PROGRAM

## 2025-01-08 PROCEDURE — 80048 BASIC METABOLIC PNL TOTAL CA: CPT | Performed by: STUDENT IN AN ORGANIZED HEALTH CARE EDUCATION/TRAINING PROGRAM

## 2025-01-08 PROCEDURE — 250N000011 HC RX IP 250 OP 636: Performed by: INTERNAL MEDICINE

## 2025-01-08 PROCEDURE — 999N000157 HC STATISTIC RCP TIME EA 10 MIN

## 2025-01-08 PROCEDURE — 250N000012 HC RX MED GY IP 250 OP 636 PS 637: Performed by: INTERNAL MEDICINE

## 2025-01-08 PROCEDURE — 85048 AUTOMATED LEUKOCYTE COUNT: CPT | Performed by: STUDENT IN AN ORGANIZED HEALTH CARE EDUCATION/TRAINING PROGRAM

## 2025-01-08 PROCEDURE — 250N000013 HC RX MED GY IP 250 OP 250 PS 637: Performed by: INTERNAL MEDICINE

## 2025-01-08 PROCEDURE — 36415 COLL VENOUS BLD VENIPUNCTURE: CPT | Performed by: STUDENT IN AN ORGANIZED HEALTH CARE EDUCATION/TRAINING PROGRAM

## 2025-01-08 PROCEDURE — 84132 ASSAY OF SERUM POTASSIUM: CPT | Performed by: STUDENT IN AN ORGANIZED HEALTH CARE EDUCATION/TRAINING PROGRAM

## 2025-01-08 PROCEDURE — 94799 UNLISTED PULMONARY SVC/PX: CPT

## 2025-01-08 PROCEDURE — 999N000111 HC STATISTIC OT IP EVAL DEFER: Performed by: OCCUPATIONAL THERAPIST

## 2025-01-08 PROCEDURE — 120N000001 HC R&B MED SURG/OB

## 2025-01-08 RX ORDER — GUAR GUM
1 PACKET (EA) ORAL DAILY
Status: DISCONTINUED | OUTPATIENT
Start: 2025-01-08 | End: 2025-01-14 | Stop reason: HOSPADM

## 2025-01-08 RX ORDER — GUAIFENESIN 200 MG/10ML
200 LIQUID ORAL EVERY 4 HOURS PRN
Status: DISCONTINUED | OUTPATIENT
Start: 2025-01-08 | End: 2025-01-14 | Stop reason: HOSPADM

## 2025-01-08 RX ORDER — OMEGA-3-ACID ETHYL ESTERS 1 G/1
1 CAPSULE, LIQUID FILLED ORAL 2 TIMES DAILY
Status: DISCONTINUED | OUTPATIENT
Start: 2025-01-08 | End: 2025-01-14 | Stop reason: HOSPADM

## 2025-01-08 RX ORDER — GUAIFENESIN 600 MG/1
600 TABLET, EXTENDED RELEASE ORAL 2 TIMES DAILY PRN
Status: DISCONTINUED | OUTPATIENT
Start: 2025-01-08 | End: 2025-01-14 | Stop reason: HOSPADM

## 2025-01-08 RX ADMIN — APIXABAN 2.5 MG: 2.5 TABLET, FILM COATED ORAL at 21:02

## 2025-01-08 RX ADMIN — OMEGA-3-ACID ETHYL ESTERS 1 G: 1 CAPSULE, LIQUID FILLED ORAL at 21:02

## 2025-01-08 RX ADMIN — MEROPENEM 1 G: 1 INJECTION, POWDER, FOR SOLUTION INTRAVENOUS at 13:28

## 2025-01-08 RX ADMIN — GUAIFENESIN 200 MG: 100 LIQUID ORAL at 18:53

## 2025-01-08 RX ADMIN — OMEGA-3-ACID ETHYL ESTERS 1 G: 1 CAPSULE, LIQUID FILLED ORAL at 13:27

## 2025-01-08 RX ADMIN — LEVOTHYROXINE SODIUM 175 MCG: 150 TABLET ORAL at 09:29

## 2025-01-08 RX ADMIN — SIROLIMUS 1 MG: 0.5 TABLET ORAL at 09:30

## 2025-01-08 RX ADMIN — VANCOMYCIN HYDROCHLORIDE 1000 MG: 1 INJECTION, SOLUTION INTRAVENOUS at 02:18

## 2025-01-08 RX ADMIN — URSODIOL 250 MG: 250 TABLET ORAL at 21:02

## 2025-01-08 RX ADMIN — SITAGLIPTIN 50 MG: 50 TABLET, FILM COATED ORAL at 09:31

## 2025-01-08 RX ADMIN — APIXABAN 2.5 MG: 2.5 TABLET, FILM COATED ORAL at 09:28

## 2025-01-08 RX ADMIN — CALCITRIOL CAPSULES 0.25 MCG 0.25 MCG: 0.25 CAPSULE ORAL at 09:29

## 2025-01-08 RX ADMIN — FOLIC ACID 1 MG: 1 TABLET ORAL at 09:23

## 2025-01-08 RX ADMIN — PREDNISONE 10 MG: 10 TABLET ORAL at 09:28

## 2025-01-08 RX ADMIN — GABAPENTIN 300 MG: 250 SUSPENSION ORAL at 21:02

## 2025-01-08 RX ADMIN — METOPROLOL SUCCINATE 25 MG: 25 TABLET, EXTENDED RELEASE ORAL at 09:29

## 2025-01-08 RX ADMIN — MEROPENEM 1 G: 1 INJECTION, POWDER, FOR SOLUTION INTRAVENOUS at 01:37

## 2025-01-08 RX ADMIN — URSODIOL 250 MG: 250 TABLET ORAL at 09:23

## 2025-01-08 RX ADMIN — EZETIMIBE 10 MG: 10 TABLET ORAL at 21:02

## 2025-01-08 ASSESSMENT — ACTIVITIES OF DAILY LIVING (ADL)
ADLS_ACUITY_SCORE: 59
ADLS_ACUITY_SCORE: 59
ADLS_ACUITY_SCORE: 66
ADLS_ACUITY_SCORE: 63
ADLS_ACUITY_SCORE: 63
ADLS_ACUITY_SCORE: 66
ADLS_ACUITY_SCORE: 63
ADLS_ACUITY_SCORE: 63
ADLS_ACUITY_SCORE: 66
ADLS_ACUITY_SCORE: 63
ADLS_ACUITY_SCORE: 67
ADLS_ACUITY_SCORE: 59
ADLS_ACUITY_SCORE: 66
ADLS_ACUITY_SCORE: 63
ADLS_ACUITY_SCORE: 59
ADLS_ACUITY_SCORE: 63
ADLS_ACUITY_SCORE: 66
ADLS_ACUITY_SCORE: 63
ADLS_ACUITY_SCORE: 66

## 2025-01-08 NOTE — PLAN OF CARE
"Pertinent assessments: A&O, up ax1 in hallways with cane, sba in room. Voiding freely, regular bms. Remains afebrile. On RA. BS 97/126    Major Shift Events Uneventful.     Treatment Plan: ABX at discharge.     Bedside Nurse: Mary Anne Moss RN     Problem: Adult Inpatient Plan of Care  Goal: Plan of Care Review  Description: The Plan of Care Review/Shift note should be completed every shift.  The Outcome Evaluation is a brief statement about your assessment that the patient is improving, declining, or no change.  This information will be displayed automatically on your shift  note.  Outcome: Progressing  Flowsheets (Taken 1/8/2025 1445)  Outcome Evaluation: Treatment Plan: ABX at discharge.  Plan of Care Reviewed With: patient  Overall Patient Progress: improving  Goal: Patient-Specific Goal (Individualized)  Description: You can add care plan individualizations to a care plan. Examples of Individualization might be:  \"Parent requests to be called daily at 9am for status\", \"I have a hard time hearing out of my right ear\", or \"Do not touch me to wake me up as it startles  me\".  Outcome: Progressing  Goal: Absence of Hospital-Acquired Illness or Injury  Outcome: Progressing  Intervention: Identify and Manage Fall Risk  Recent Flowsheet Documentation  Taken 1/8/2025 0934 by Mary Anne Moss RN  Safety Promotion/Fall Prevention:   activity supervised   clutter free environment maintained   increased rounding and observation   increase visualization of patient   safety round/check completed  Intervention: Prevent Infection  Recent Flowsheet Documentation  Taken 1/8/2025 0934 by Mary Anne Moss RN  Infection Prevention: rest/sleep promoted  Goal: Optimal Comfort and Wellbeing  Outcome: Progressing  Goal: Readiness for Transition of Care  Outcome: Progressing     Problem: Skin Injury Risk Increased  Goal: Skin Health and Integrity  Outcome: Progressing     Problem: Gas Exchange Impaired  Goal: Optimal Gas " Exchange  Outcome: Progressing   Goal Outcome Evaluation:      Plan of Care Reviewed With: patient    Overall Patient Progress: improvingOverall Patient Progress: improving    Outcome Evaluation: Treatment Plan: ABX at discharge.

## 2025-01-08 NOTE — PLAN OF CARE
"To Do:  End of Shift Summary  For vital signs and complete assessments, please see documentation flowsheets.     Pertinent assessments: [place]. Pt A/O. VSS. O2 2L NC. Denies pain. Refused skin ax. Purewick in place, refused to be check this am. Tele- SR  Major Shift Events None  Treatment Plan: Merrem, Vanco IV, symptom management and wean O2.  Bedside Nurse: Helen Choudhary RN       Problem: Adult Inpatient Plan of Care  Goal: Plan of Care Review  Description: The Plan of Care Review/Shift note should be completed every shift.  The Outcome Evaluation is a brief statement about your assessment that the patient is improving, declining, or no change.  This information will be displayed automatically on your shift  note.  Outcome: Not Progressing  Flowsheets (Taken 1/8/2025 0637)  Outcome Evaluation: O2 2L NC  Overall Patient Progress: no change  Goal: Patient-Specific Goal (Individualized)  Description: You can add care plan individualizations to a care plan. Examples of Individualization might be:  \"Parent requests to be called daily at 9am for status\", \"I have a hard time hearing out of my right ear\", or \"Do not touch me to wake me up as it startles  me\".  Outcome: Not Progressing  Goal: Absence of Hospital-Acquired Illness or Injury  Outcome: Not Progressing  Goal: Optimal Comfort and Wellbeing  Outcome: Not Progressing  Goal: Readiness for Transition of Care  Outcome: Not Progressing     Problem: Skin Injury Risk Increased  Goal: Skin Health and Integrity  Outcome: Not Progressing     Problem: Gas Exchange Impaired  Goal: Optimal Gas Exchange  Outcome: Not Progressing   Goal Outcome Evaluation:           Overall Patient Progress: no changeOverall Patient Progress: no change    Outcome Evaluation: O2 2L NC      "

## 2025-01-08 NOTE — PLAN OF CARE
"Goal Outcome Evaluation:      Plan of Care Reviewed With: patient    Overall Patient Progress: improving Overall Patient Progress: improving    Outcome Evaluation: up to floor around 1830, denies pain    To Do:  End of Shift Summary  For vital signs and complete assessments, please see documentation flowsheets.     Pertinent assessments: Up to floor around 1830. /68, otherwise VSS. Tmax 99.5 this evening. Denies pain, nausea, vomiting. On Mod CHO diet. LS diminished. BS active x4. Up SBA with cane. On tele - SR 74 per tele tech. Purewick in place overnight per pt request. R PIV SL. Contact precautions in place.     Major Shift Events: admission to floor    Treatment Plan: Merrem, Vanco, monitor respiratory status, symptom management, discharge TBD    Bedside Nurse: Jah Hidalgo RN           Problem: Adult Inpatient Plan of Care  Goal: Plan of Care Review  Description: The Plan of Care Review/Shift note should be completed every shift.  The Outcome Evaluation is a brief statement about your assessment that the patient is improving, declining, or no change.  This information will be displayed automatically on your shift  note.  Outcome: Progressing  Flowsheets (Taken 1/7/2025 2300)  Outcome Evaluation: up to floor around 1830, denies pain  Plan of Care Reviewed With: patient  Overall Patient Progress: improving  Goal: Patient-Specific Goal (Individualized)  Description: You can add care plan individualizations to a care plan. Examples of Individualization might be:  \"Parent requests to be called daily at 9am for status\", \"I have a hard time hearing out of my right ear\", or \"Do not touch me to wake me up as it startles  me\".  Outcome: Progressing  Goal: Absence of Hospital-Acquired Illness or Injury  Outcome: Progressing  Intervention: Identify and Manage Fall Risk  Recent Flowsheet Documentation  Taken 1/7/2025 2100 by Jah Hidalgo, RN  Safety Promotion/Fall Prevention:   activity supervised   clutter free " environment maintained   increased rounding and observation   nonskid shoes/slippers when out of bed   patient and family education   room door open   room near nurse's station   room organization consistent   safety round/check completed  Intervention: Prevent and Manage VTE (Venous Thromboembolism) Risk  Recent Flowsheet Documentation  Taken 1/7/2025 2100 by Jah Hidalgo, RN  VTE Prevention/Management: SCDs off (sequential compression devices)  Intervention: Prevent Infection  Recent Flowsheet Documentation  Taken 1/7/2025 2100 by Jah Hidalgo, RN  Infection Prevention:   cohorting utilized   hand hygiene promoted   rest/sleep promoted   single patient room provided  Goal: Optimal Comfort and Wellbeing  Outcome: Progressing  Goal: Readiness for Transition of Care  Outcome: Progressing  Intervention: Mutually Develop Transition Plan  Recent Flowsheet Documentation  Taken 1/7/2025 2000 by Jah Hidalgo, RN  Equipment Currently Used at Home:   cane, quad   walker, standard     Problem: Skin Injury Risk Increased  Goal: Skin Health and Integrity  Outcome: Progressing  Intervention: Plan: Nurse Driven Intervention: Moisture Management  Recent Flowsheet Documentation  Taken 1/7/2025 2100 by Jah Hidalgo, RN  Moisture Interventions:   Urinary collection device   Encourage regular toileting   Incontinence pad     Problem: Gas Exchange Impaired  Goal: Optimal Gas Exchange  Outcome: Progressing

## 2025-01-08 NOTE — PROGRESS NOTES
01/08/25 0917   Appointment Info   Signing Clinician's Name / Credentials (PT) Duong Luque DPT   Living Environment   Living Environment Comments Pt reports living in ILF by self, use of cane inside apartment and utilizes stand up 4ww in hallways. No stairs to manage. Receives 2 meals/day, granddaughter assists with cleaning, daughter provides supervision with showers   Self-Care   Usual Activity Tolerance moderate   Current Activity Tolerance moderate   Equipment Currently Used at Home cane, quad;walker, standard;walker, rolling;grab bar, toilet;grab bar, tub/shower   Fall history within last six months no   General Information   Onset of Illness/Injury or Date of Surgery 01/06/25   Referring Physician Will Schaefer MD   Patient/Family Therapy Goals Statement (PT) To improve mobility and health to return home   Pertinent History of Current Problem (include personal factors and/or comorbidities that impact the POC) Per H&P, pt is a 74 year old female with a significant past medical history, including primary biliary cirrhosis post-liver transplant (2002) on chronic immunosuppression, atrial fibrillation on anticoagulation, prior cerebrovascular accident (CVA), chronic kidney disease (CKD) stage III, and type 2 diabetes mellitus HLD, HTN and PAD. She was admitted with fever and generalized weakness.   Existing Precautions/Restrictions fall   Cognition   Affect/Mental Status (Cognition) WFL   Orientation Status (Cognition) oriented x 4   Follows Commands (Cognition) WFL   Pain Assessment   Patient Currently in Pain   (reports chronic  L knee pain)   Posture    Posture Forward head position;Protracted shoulders   Range of Motion (ROM)   Range of Motion ROM is WFL   Strength (Manual Muscle Testing)   Strength (Manual Muscle Testing) Deficits observed during functional mobility   Strength Comments Able to complete B SLR   Bed Mobility   Comment, (Bed Mobility) SBA supine <> sit   Transfers   Comment,  (Transfers) SBA sit <> Stand with SEC   Gait/Stairs (Locomotion)   Distance in Feet (Gait) 12   Pattern (Gait) step-to   Deviations/Abnormal Patterns (Gait) base of support, wide;gait speed decreased;stride length decreased;weight shifting decreased   Comment, (Gait/Stairs) CGA with SEC   Balance   Balance Comments good sitting balance; fair+ standing with SEC   Clinical Impression   Criteria for Skilled Therapeutic Intervention Yes, treatment indicated   PT Diagnosis (PT) impaired functional mobility   Influenced by the following impairments decreased balance, impaired strength, decreased activity tolerance   Functional limitations due to impairments decreased bed mobility, transfers, ambulation   Clinical Presentation (PT Evaluation Complexity) stable   Clinical Presentation Rationale Pt functional status   Clinical Decision Making (Complexity) low complexity   Planned Therapy Interventions (PT) balance training;bed mobility training;gait training;home exercise program;neuromuscular re-education;patient/family education;strengthening;transfer training;progressive activity/exercise;postural re-education   Risk & Benefits of therapy have been explained evaluation/treatment results reviewed;care plan/treatment goals reviewed;risks/benefits reviewed;current/potential barriers reviewed;participants voiced agreement with care plan;participants included;patient   PT Total Evaluation Time   PT Eval, Low Complexity Minutes (76056) 8   Physical Therapy Goals   PT Frequency Daily   PT Predicted Duration/Target Date for Goal Attainment 01/11/25   PT Goals Bed Mobility;Transfers;Gait   PT: Bed Mobility Independent;Supine to/from sit   PT: Transfers Modified independent;Sit to/from stand;Assistive device   PT: Gait Modified independent;Straight cane;100 feet   PT Discharge Planning   PT Plan repeated STS, progress ambulation distance   PT Discharge Recommendation (DC Rec) home   PT Rationale for DC Rec Pt below baseline level  of mobility, currently Ax1 with cane. Anticipate with  IP PT that pt will progress to  return home  with HHPT to continue progressing mobility andbalance   PT Brief overview of current status Ax1 cane   Physical Therapy Time and Intention   Timed Code Treatment Minutes 17   Total Session Time (sum of timed and untimed services) 25

## 2025-01-08 NOTE — PROGRESS NOTES
01/07/25 2100   Skin   Skin WDL X;color;moisture;integrity   Skin Color pale   Skin Moisture flaky;dry   Skin Integrity abrasion/excoriation;bruised (ecchymotic)   Abrasion / Excoriation Right;Knee   Bruised (ecchymotic) location Other (Comment)  (scattered)   Device Skin Interventions Taken No adjustments needed     Admission/Transfer from: ED  2 RN skin assessment completed. Yes  Significant findings include: Dry skin, pale, abrasion/excoriation; bruised  LDA added (if applicable)? Not Applicable  Requested WOC Nurse Consult from MD? Not Applicable    Jah Hidalgo, RN

## 2025-01-08 NOTE — PROGRESS NOTES
Pt still need UA, best to use Bedside commode. On 2 Lpm NC  OT PT and speech following.  On CONTACT for ESBL.  BG checks 2x daily.  On TELE SR 70. Hx of   Afib/A flutter CKD 3. Pt receiving Vanco and Merrem

## 2025-01-08 NOTE — PROGRESS NOTES
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 She was admitted with fever and generalized weakness. Recent admission with same issues, viral and culture workup negative. Appears to have new lung infiltrate that could represent pneumonia. Receiving empiric antibiotics, awaiting final culture results.     Fever  Right lowe lobe infiltrate aspiration vs HAP ( this was noted on CT scan from 12/10/24)  Patient presents for the 2nd time in less than a week with fever and prior admission work up was largely negative with continued negative blood cultures earlier.  CXR shows new airspace disease in RLL but there was mention of small ground glass opacities on the right lower lobe.  Patient has redrawn blood cultures, check Nasal for MRSA  Continue meropenem for now, follow up culture results  Patient declined SLP evaluation to rule out aspiration     Bilateral LE erythema  Venous stasis changes vs cellulitis  Appearance can be due to stasis dermatitis given its symmetry but in setting of ongoing fever, will monitor and treat for soft tissue infection as well  Continue Meropenem     History of PBC  S/P liver transplant  Continue Urosdiol  Continue Prednisone  Continue Sirolimus, check levels     Hx of DVT  Continue apixaban     PAF  Continue Toprol XL 25 mg daily  Continue apixaban     Hypothyroidism  Continue Synthroid replacement     DM type II  Continue linagliptin  Mod CHO diet  Accucheck BID          Diet: Moderate Consistent Carb (60 g CHO per Meal) Diet    DVT Prophylaxis: DOAC  Ron Catheter: Not present  Lines:  "None     Cardiac Monitoring: ACTIVE order. Indication: Tachyarrhythmias, acute (48 hours)  Code Status: Full Code      Clinically Significant Risk Factors          # Hyperchloremia: Highest Cl = 109 mmol/L in last 2 days, will monitor as appropriate      # Hypocalcemia: Lowest Ca = 8.6 mg/dL in last 2 days, will monitor and replace as appropriate    # Anion Gap Metabolic Acidosis: Highest Anion Gap = 19 mmol/L in last 2 days, will monitor and treat as appropriate      # Hypertension: Noted on problem list           # DMII: A1C = 6.9 % (Ref range: <5.7 %) within past 6 months, PRESENT ON ADMISSION  # Overweight: Estimated body mass index is 28.66 kg/m  as calculated from the following:    Height as of this encounter: 1.702 m (5' 7\").    Weight as of this encounter: 83 kg (182 lb 15.7 oz)., PRESENT ON ADMISSION            Social Drivers of Health    Housing Stability: Low Risk  (1/7/2025)    Housing Stability     Do you have housing? : Yes     Are you worried about losing your housing?: No   Recent Concern: Housing Stability - High Risk (10/12/2024)    Housing Stability     Do you have housing? : No     Are you worried about losing your housing?: No   Tobacco Use: Medium Risk (1/2/2025)    Patient History     Smoking Tobacco Use: Former     Smokeless Tobacco Use: Never   Physical Activity: Insufficiently Active (11/9/2023)    Received from [facility]    Exercise Vital Sign     Days of Exercise per Week: 5 days     Minutes of Exercise per Session: 10 min          Disposition Plan     Medically Ready for Discharge: Anticipated Tomorrow             Jose Renee MD  Hospitalist Service  [facility]  Securely message with Zachariah (more info)  Text page via [facility] Paging/Directory   ______________________________________________________________________    Interval History   NAEO. Currently on 2L O2 by NC but no shortness of breath, overall feeling pretty well.     Physical Exam   Vital Signs: Temp: " 98.6  F (37  C) Temp src: Oral BP: 119/54 Pulse: 75   Resp: 20 SpO2: 100 % O2 Device: None (Room air) Oxygen Delivery: 2 LPM  Weight: 182 lbs 15.71 oz    Constitutional: Sitting in bed, no acute distress  Respiratory: Lungs clear, normal WOB, 2L by NC  Cardiovascular: RRR, no MRGs  GI: Soft, non-tender, non-distended  Neurologic: AAOx3, mentating clearly    Medical Decision Making       45 MINUTES SPENT BY ME on the date of service doing chart review, history, exam, documentation & further activities per the note.      Data     I have personally reviewed the following data over the past 24 hrs:    5.9  \   8.2 (L)   / 318     140 109 (H) 32.4 (H) /  97   3.6 19 (L) 1.46 (H) \       Imaging results reviewed over the past 24 hrs:   No results found for this or any previous visit (from the past 24 hours).

## 2025-01-08 NOTE — PLAN OF CARE
OT: Order received, chart reviewed and discussed with care team. Per discussion with PT and the patient, pt has no OT needs at this time. Pt reports she owns all necessary AE and is mobilizing well with PT. Defer discharge recommendations to PT. Will complete orders.

## 2025-01-09 ENCOUNTER — APPOINTMENT (OUTPATIENT)
Dept: PHYSICAL THERAPY | Facility: CLINIC | Age: 75
DRG: 194 | End: 2025-01-09
Payer: MEDICARE

## 2025-01-09 VITALS
BODY MASS INDEX: 30.24 KG/M2 | RESPIRATION RATE: 16 BRPM | OXYGEN SATURATION: 98 % | HEIGHT: 67 IN | HEART RATE: 72 BPM | WEIGHT: 192.68 LBS | TEMPERATURE: 99.5 F | DIASTOLIC BLOOD PRESSURE: 51 MMHG | SYSTOLIC BLOOD PRESSURE: 115 MMHG

## 2025-01-09 LAB
BACTERIA BLD CULT: NORMAL
BACTERIA BLD CULT: NORMAL
GLUCOSE BLDC GLUCOMTR-MCNC: 131 MG/DL (ref 70–99)
GLUCOSE BLDC GLUCOMTR-MCNC: 97 MG/DL (ref 70–99)

## 2025-01-09 PROCEDURE — 97530 THERAPEUTIC ACTIVITIES: CPT | Mod: GP

## 2025-01-09 PROCEDURE — 272N000202 HC AEROBIKA WITH MANOMETER

## 2025-01-09 PROCEDURE — 97116 GAIT TRAINING THERAPY: CPT | Mod: GP

## 2025-01-09 PROCEDURE — 250N000011 HC RX IP 250 OP 636: Performed by: INTERNAL MEDICINE

## 2025-01-09 PROCEDURE — 94664 DEMO&/EVAL PT USE INHALER: CPT

## 2025-01-09 PROCEDURE — 99232 SBSQ HOSP IP/OBS MODERATE 35: CPT | Performed by: STUDENT IN AN ORGANIZED HEALTH CARE EDUCATION/TRAINING PROGRAM

## 2025-01-09 PROCEDURE — 120N000001 HC R&B MED SURG/OB

## 2025-01-09 PROCEDURE — 250N000012 HC RX MED GY IP 250 OP 636 PS 637: Performed by: INTERNAL MEDICINE

## 2025-01-09 PROCEDURE — 250N000013 HC RX MED GY IP 250 OP 250 PS 637: Performed by: INTERNAL MEDICINE

## 2025-01-09 PROCEDURE — 250N000013 HC RX MED GY IP 250 OP 250 PS 637: Performed by: STUDENT IN AN ORGANIZED HEALTH CARE EDUCATION/TRAINING PROGRAM

## 2025-01-09 RX ORDER — AMLODIPINE BESYLATE 2.5 MG/1
2.5 TABLET ORAL DAILY
Status: DISCONTINUED | OUTPATIENT
Start: 2025-01-09 | End: 2025-01-12

## 2025-01-09 RX ADMIN — GABAPENTIN 300 MG: 250 SUSPENSION ORAL at 21:09

## 2025-01-09 RX ADMIN — APIXABAN 2.5 MG: 2.5 TABLET, FILM COATED ORAL at 09:36

## 2025-01-09 RX ADMIN — CALCITRIOL CAPSULES 0.25 MCG 0.25 MCG: 0.25 CAPSULE ORAL at 09:36

## 2025-01-09 RX ADMIN — MEROPENEM 1 G: 1 INJECTION, POWDER, FOR SOLUTION INTRAVENOUS at 13:48

## 2025-01-09 RX ADMIN — METOPROLOL SUCCINATE 25 MG: 25 TABLET, EXTENDED RELEASE ORAL at 09:36

## 2025-01-09 RX ADMIN — OMEGA-3-ACID ETHYL ESTERS 1 G: 1 CAPSULE, LIQUID FILLED ORAL at 09:36

## 2025-01-09 RX ADMIN — URSODIOL 250 MG: 250 TABLET ORAL at 09:36

## 2025-01-09 RX ADMIN — OMEGA-3-ACID ETHYL ESTERS 1 G: 1 CAPSULE, LIQUID FILLED ORAL at 21:07

## 2025-01-09 RX ADMIN — APIXABAN 2.5 MG: 2.5 TABLET, FILM COATED ORAL at 21:07

## 2025-01-09 RX ADMIN — SITAGLIPTIN 50 MG: 50 TABLET, FILM COATED ORAL at 09:36

## 2025-01-09 RX ADMIN — SIROLIMUS 1 MG: 0.5 TABLET ORAL at 09:36

## 2025-01-09 RX ADMIN — FOLIC ACID 1 MG: 1 TABLET ORAL at 09:36

## 2025-01-09 RX ADMIN — URSODIOL 250 MG: 250 TABLET ORAL at 21:07

## 2025-01-09 RX ADMIN — MEROPENEM 1 G: 1 INJECTION, POWDER, FOR SOLUTION INTRAVENOUS at 02:39

## 2025-01-09 RX ADMIN — LEVOTHYROXINE SODIUM 175 MCG: 150 TABLET ORAL at 09:36

## 2025-01-09 RX ADMIN — PREDNISONE 10 MG: 10 TABLET ORAL at 09:36

## 2025-01-09 RX ADMIN — AMLODIPINE BESYLATE 2.5 MG: 2.5 TABLET ORAL at 13:48

## 2025-01-09 RX ADMIN — EZETIMIBE 10 MG: 10 TABLET ORAL at 21:07

## 2025-01-09 ASSESSMENT — ACTIVITIES OF DAILY LIVING (ADL)
ADLS_ACUITY_SCORE: 59
ADLS_ACUITY_SCORE: 64
ADLS_ACUITY_SCORE: 59
ADLS_ACUITY_SCORE: 64
ADLS_ACUITY_SCORE: 59
ADLS_ACUITY_SCORE: 59
ADLS_ACUITY_SCORE: 64
ADLS_ACUITY_SCORE: 59
ADLS_ACUITY_SCORE: 64
ADLS_ACUITY_SCORE: 59
ADLS_ACUITY_SCORE: 59
ADLS_ACUITY_SCORE: 67
ADLS_ACUITY_SCORE: 59

## 2025-01-09 NOTE — PLAN OF CARE
"A&Ox4. [place]. VSS on RA- HTN at times but within parameters.  98.2 temp. Up SBA with cane. Voiding adequately. Working with PT/OT. Saline locked. IV merrem.    Major Shift Events:   Uneventful night, slept well. No significant change in condition.     Treatment Plan:   IV merrem. CHO diet. Telemetry monitoring. PT/OT. BLood cx pending. Isolation for ESBL. Pt states that she declined ID consult, because she has established care with her own ID outside of the hospital.                                           Problem: Adult Inpatient Plan of Care  Goal: Plan of Care Review  Description: The Plan of Care Review/Shift note should be completed every shift.  The Outcome Evaluation is a brief statement about your assessment that the patient is improving, declining, or no change.  This information will be displayed automatically on your shift  note.  Outcome: Progressing  Flowsheets (Taken 1/9/2025 0655)  Plan of Care Reviewed With: patient  Overall Patient Progress: improving  Goal: Patient-Specific Goal (Individualized)  Description: You can add care plan individualizations to a care plan. Examples of Individualization might be:  \"Parent requests to be called daily at 9am for status\", \"I have a hard time hearing out of my right ear\", or \"Do not touch me to wake me up as it startles  me\".  Outcome: Progressing  Goal: Absence of Hospital-Acquired Illness or Injury  Outcome: Progressing  Intervention: Identify and Manage Fall Risk  Recent Flowsheet Documentation  Taken 1/8/2025 2145 by Suzan Mejia RN  Safety Promotion/Fall Prevention: safety round/check completed  Intervention: Prevent Skin Injury  Recent Flowsheet Documentation  Taken 1/8/2025 2145 by Suzan Mejia RN  Body Position: position changed independently  Intervention: Prevent and Manage VTE (Venous Thromboembolism) Risk  Recent Flowsheet Documentation  Taken 1/8/2025 2145 by Suzan Mejia, RN  VTE Prevention/Management: SCDs off (sequential " compression devices)  Goal: Optimal Comfort and Wellbeing  Outcome: Progressing  Goal: Readiness for Transition of Care  Outcome: Progressing   Goal Outcome Evaluation:      Plan of Care Reviewed With: patient    Overall Patient Progress: improvingOverall Patient Progress: improving

## 2025-01-09 NOTE — PLAN OF CARE
"  Problem: Adult Inpatient Plan of Care  Goal: Plan of Care Review  Description: The Plan of Care Review/Shift note should be completed every shift.  The Outcome Evaluation is a brief statement about your assessment that the patient is improving, declining, or no change.  This information will be displayed automatically on your shift  note.  Outcome: Progressing  Flowsheets (Taken 1/9/2025 1449)  Outcome Evaluation: bp elevated. restarted norvasc. temp max 99.1. iv abx., need UC. pt up to br with sba of 1 with cane, gb. tele sr per tele tech.  Plan of Care Reviewed With: patient  Overall Patient Progress: improving  Goal: Patient-Specific Goal (Individualized)  Description: You can add care plan individualizations to a care plan. Examples of Individualization might be:  \"Parent requests to be called daily at 9am for status\", \"I have a hard time hearing out of my right ear\", or \"Do not touch me to wake me up as it startles  me\".  Outcome: Progressing  Goal: Absence of Hospital-Acquired Illness or Injury  Outcome: Progressing  Intervention: Identify and Manage Fall Risk  Recent Flowsheet Documentation  Taken 1/9/2025 0930 by Evita Newsome RN  Safety Promotion/Fall Prevention: safety round/check completed  Intervention: Prevent Skin Injury  Recent Flowsheet Documentation  Taken 1/9/2025 0930 by Evita Newsome RN  Body Position: position changed independently  Intervention: Prevent and Manage VTE (Venous Thromboembolism) Risk  Recent Flowsheet Documentation  Taken 1/9/2025 0930 by Evita Newsome RN  VTE Prevention/Management: SCDs off (sequential compression devices)  Intervention: Prevent Infection  Recent Flowsheet Documentation  Taken 1/9/2025 0930 by Evita Newsome RN  Infection Prevention:   single patient room provided   hand hygiene promoted  Goal: Optimal Comfort and Wellbeing  Outcome: Progressing  Goal: Readiness for Transition of Care  Outcome: Progressing     Problem: " Skin Injury Risk Increased  Goal: Skin Health and Integrity  Outcome: Progressing  Intervention: Optimize Skin Protection  Recent Flowsheet Documentation  Taken 1/9/2025 0930 by Evita Newsome, RN  Activity Management:   ambulated to bathroom   back to bed   sitting, edge of bed  Head of Bed (HOB) Positioning: HOB at 45 degrees     Problem: Gas Exchange Impaired  Goal: Optimal Gas Exchange  Outcome: Progressing  Intervention: Optimize Oxygenation and Ventilation  Recent Flowsheet Documentation  Taken 1/9/2025 0930 by Evita Newsome RN  Head of Bed (HOB) Positioning: HOB at 45 degrees       Goal Outcome Evaluation:      Plan of Care Reviewed With: patient    Overall Patient Progress: improvingOverall Patient Progress: improving    Outcome Evaluation: bp elevated. restarted norvasc. temp max 99.1. iv abx., need UC. pt up to br with sba of 1 with cane, gb. tele sr per tele tech.

## 2025-01-09 NOTE — PLAN OF CARE
"Goal Outcome Evaluation:      Plan of Care Reviewed With: patient    Overall Patient Progress: improving Overall Patient Progress: improving    Outcome Evaluation: abx, on RA    To Do:  End of Shift Summary  For vital signs and complete assessments, please see documentation flowsheets.     Pertinent assessments: A&Ox4. [place]. /59, otherwise VSS on RA.  Tmax today 99.0. Up SBA with cane. Voiding adequately. Working with PT/OT.     Major Shift Events: Uneventful    Treatment Plan: ABX at discharge.     Bedside Nurse: Jah Hidalgo RN          Problem: Adult Inpatient Plan of Care  Goal: Plan of Care Review  Description: The Plan of Care Review/Shift note should be completed every shift.  The Outcome Evaluation is a brief statement about your assessment that the patient is improving, declining, or no change.  This information will be displayed automatically on your shift  note.  Outcome: Progressing  Flowsheets (Taken 1/8/2025 1835)  Outcome Evaluation: abx, on RA  Plan of Care Reviewed With: patient  Overall Patient Progress: improving  Goal: Patient-Specific Goal (Individualized)  Description: You can add care plan individualizations to a care plan. Examples of Individualization might be:  \"Parent requests to be called daily at 9am for status\", \"I have a hard time hearing out of my right ear\", or \"Do not touch me to wake me up as it startles  me\".  Outcome: Progressing  Goal: Absence of Hospital-Acquired Illness or Injury  Outcome: Progressing  Intervention: Identify and Manage Fall Risk  Recent Flowsheet Documentation  Taken 1/8/2025 1730 by Jah Hidalgo, RN  Safety Promotion/Fall Prevention:   activity supervised   clutter free environment maintained   increased rounding and observation   nonskid shoes/slippers when out of bed   patient and family education   room door open   room near nurse's station   room organization consistent   safety round/check completed  Intervention: Prevent Skin Injury  Recent " Flowsheet Documentation  Taken 1/8/2025 1730 by Jah Hidalgo, RN  Body Position: position changed independently  Intervention: Prevent and Manage VTE (Venous Thromboembolism) Risk  Recent Flowsheet Documentation  Taken 1/8/2025 1730 by Jah Hidalgo RN  VTE Prevention/Management: SCDs off (sequential compression devices)  Intervention: Prevent Infection  Recent Flowsheet Documentation  Taken 1/8/2025 1730 by Jah Hidalgo RN  Infection Prevention:   cohorting utilized   hand hygiene promoted   rest/sleep promoted   single patient room provided  Goal: Optimal Comfort and Wellbeing  Outcome: Progressing  Goal: Readiness for Transition of Care  Outcome: Progressing

## 2025-01-09 NOTE — CONSULTS
Care Management Initial Consult    General Information  Assessment completed with: Patient,    Type of CM/SW Visit: Initial Assessment    Primary Care Provider verified and updated as needed:     Readmission within the last 30 days:           Advance Care Planning:            Communication Assessment  Patient's communication style: spoken language (English or Bilingual)    Hearing Difficulty or Deaf: yes   Wear Glasses or Blind: yes    Cognitive  Cognitive/Neuro/Behavioral: WDL                      Living Environment:   People in home: alone     Current living Arrangements: independent living facility      Able to return to prior arrangements: yes       Family/Social Support:  Care provided by: self  Provides care for: no one     Support system:            Description of Support System:           Current Resources:   Patient receiving home care services:          Community Resources:    Equipment currently used at home: cane, quad, walker, standard, walker, rolling, grab bar, toilet, grab bar, tub/shower  Supplies currently used at home:      Employment/Financial:  Employment Status:          Financial Concerns:             Does the patient's insurance plan have a 3 day qualifying hospital stay waiver?  No    Lifestyle & Psychosocial Needs:  Social Drivers of Health     Food Insecurity: Low Risk  (1/7/2025)    Food Insecurity     Within the past 12 months, did you worry that your food would run out before you got money to buy more?: No     Within the past 12 months, did the food you bought just not last and you didn t have money to get more?: No   Depression: Not at risk (1/2/2025)    PHQ-2     PHQ-2 Score: 2   Housing Stability: Low Risk  (1/7/2025)    Housing Stability     Do you have housing? : Yes     Are you worried about losing your housing?: No   Recent Concern: Housing Stability - High Risk (10/12/2024)    Housing Stability     Do you have housing? : No     Are you worried about losing your housing?: No    Tobacco Use: Medium Risk (1/2/2025)    Patient History     Smoking Tobacco Use: Former     Smokeless Tobacco Use: Never     Passive Exposure: Not on file   Financial Resource Strain: Low Risk  (1/7/2025)    Financial Resource Strain     Within the past 12 months, have you or your family members you live with been unable to get utilities (heat, electricity) when it was really needed?: No   Alcohol Use: Not At Risk (11/9/2023)    Received from [facility]    AUDIT-C     Frequency of Alcohol Consumption: Never     Average Number of Drinks: Patient does not drink     Frequency of Binge Drinking: Never   Transportation Needs: Low Risk  (1/7/2025)    Transportation Needs     Within the past 12 months, has lack of transportation kept you from medical appointments, getting your medicines, non-medical meetings or appointments, work, or from getting things that you need?: No   Physical Activity: Insufficiently Active (11/9/2023)    Received from [facility]    Exercise Vital Sign     Days of Exercise per Week: 5 days     Minutes of Exercise per Session: 10 min   Interpersonal Safety: Low Risk  (1/7/2025)    Interpersonal Safety     Do you feel physically and emotionally safe where you currently live?: Yes     Within the past 12 months, have you been hit, slapped, kicked or otherwise physically hurt by someone?: No     Within the past 12 months, have you been humiliated or emotionally abused in other ways by your partner or ex-partner?: No   Stress: No Stress Concern Present (11/9/2023)    Received from [facility]    [employer] - Occupational Stress Questionnaire     Feeling of Stress : Only a little   Social Connections: Feeling Socially Integrated (12/20/2023)    Received from [facility]    OASIS : Social Isolation     Frequency of experiencing loneliness or isolation: Never   Health Literacy: Not on file       Functional Status:  Prior to admission patient needed assistance:    Dependent ADLs:: Ambulation-cane               Discussed  Partnership in Safe Discharge Planning  document with patient/family: No    Additional Information:  CM consult placed for elevated readmission risk score. Patient admitted with fever and generalized weakness.  Patient has therapy recommendations of home care PT. Met with patient to introduce CM role and discuss discharge planning. Patient confirms she lives at [facility] and does not receive services with them, She has utilized the therapy that is in house and she would like to continue with this as she does not want to be home bound.  Permission granted by patient to contact [facility] to get rehab information.  In house rehab is called [facility] [phone]. They would be fine if CM faxed them home PT/OT eval and treat orders on discharge. These orders can be faxed to [phone]    CM will continue to follow for discharge planning.    Jenn Will RN BSN OCN  Care Coordinator  [facility]  [phone]

## 2025-01-09 NOTE — PROGRESS NOTES
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 She was admitted with fever and generalized weakness. Recent admission with same issues, viral and culture workup negative. Appears to have new lung infiltrate that could represent pneumonia. Receiving empiric antibiotics, awaiting final culture results.     Fever-resolved  Right lowe lobe infiltrate aspiration vs HAP ( this was noted on CT scan from 12/10/24)  Patient presents for the 2nd time in less than a week with fever and prior admission work up was largely negative with continued negative blood cultures earlier.  CXR shows new airspace disease in RLL but there was mention of small ground glass opacities on the right lower lobe. Patient also endorsed bladder spasms morning of 1/9, which she states only happens when she has a UTI. Will culture urine from admission, although UA looked clean.   Blood cultures NGTD  Culturing urine as well  Continue meropenem for now, follow up culture results  Patient declined SLP evaluation to rule out aspiration     Bilateral LE erythema  Venous stasis changes vs cellulitis  Appearance can be due to stasis dermatitis given its symmetry but in setting of ongoing fever, will monitor and treat for soft tissue infection as well.   Continue Meropenem     History of PBC  S/P liver transplant  Continue Urosdiol  Continue Prednisone  Continue Sirolimus, check levels     Hx of DVT  Continue apixaban     PAF  Continue Toprol XL 25 mg daily  Continue apixaban     Hypothyroidism  Continue Synthroid replacement     DM type II  Continue  "linagliptin  Mod CHO diet  Accucheck BID          Diet: Moderate Consistent Carb (60 g CHO per Meal) Diet    DVT Prophylaxis: DOAC  Ron Catheter: Not present  Lines: None     Cardiac Monitoring: ACTIVE order. Indication: Tachyarrhythmias, acute (48 hours)  Code Status: Full Code      Clinically Significant Risk Factors          # Hyperchloremia: Highest Cl = 109 mmol/L in last 2 days, will monitor as appropriate              # Hypertension: Noted on problem list           # DMII: A1C = 6.9 % (Ref range: <5.7 %) within past 6 months, PRESENT ON ADMISSION  # Obesity: Estimated body mass index is 30.18 kg/m  as calculated from the following:    Height as of this encounter: 1.702 m (5' 7\").    Weight as of this encounter: 87.4 kg (192 lb 10.9 oz)., PRESENT ON ADMISSION            Social Drivers of Health    Housing Stability: Low Risk  (1/7/2025)    Housing Stability     Do you have housing? : Yes     Are you worried about losing your housing?: No   Recent Concern: Housing Stability - High Risk (10/12/2024)    Housing Stability     Do you have housing? : No     Are you worried about losing your housing?: No   Tobacco Use: Medium Risk (1/2/2025)    Patient History     Smoking Tobacco Use: Former     Smokeless Tobacco Use: Never   Physical Activity: Insufficiently Active (11/9/2023)    Received from [facility]    Exercise Vital Sign     Days of Exercise per Week: 5 days     Minutes of Exercise per Session: 10 min          Disposition Plan     Medically Ready for Discharge: Anticipated Tomorrow             Jose Renee MD  Hospitalist Service  [facility]  Securely message with [employer] (more info)  Text page via [employer] Paging/Directory   ______________________________________________________________________    Interval History   NAEO. A little frustrated today as she states her body temp is normally around 97.5F, and temp >98 has her concerned she has an infection that is not being adequately " treated. We discussed ongoing treatment of possible pneumonia, decided to culture urine although UA was pretty clean appearing, blood cultures negative so far. Patient agreeable with this plan.      Physical Exam   Vital Signs: Temp: 98.1  F (36.7  C) Temp src: Oral BP: (!) 159/69 Pulse: 73   Resp: 16 SpO2: 94 % O2 Device: None (Room air)    Weight: 192 lbs 10.91 oz    Constitutional: Sitting in bed, no acute distress  Respiratory: Lungs clear, normal WOB, room air  Cardiovascular: RRR, no MRGs  GI: Soft, non-tender, non-distended  Neurologic: AAOx3, mentating clearly    Medical Decision Making       45 MINUTES SPENT BY ME on the date of service doing chart review, history, exam, documentation & further activities per the note.      Data         Imaging results reviewed over the past 24 hrs:   No results found for this or any previous visit (from the past 24 hours).

## 2025-01-10 ENCOUNTER — APPOINTMENT (OUTPATIENT)
Dept: PHYSICAL THERAPY | Facility: CLINIC | Age: 75
DRG: 194 | End: 2025-01-10
Payer: MEDICARE

## 2025-01-10 LAB
ANION GAP SERPL CALCULATED.3IONS-SCNC: 13 MMOL/L (ref 7–15)
BUN SERPL-MCNC: 32.7 MG/DL (ref 8–23)
CALCIUM SERPL-MCNC: 8.9 MG/DL (ref 8.8–10.4)
CHLORIDE SERPL-SCNC: 107 MMOL/L (ref 98–107)
CREAT SERPL-MCNC: 1.46 MG/DL (ref 0.51–0.95)
EGFRCR SERPLBLD CKD-EPI 2021: 37 ML/MIN/1.73M2
ERYTHROCYTE [DISTWIDTH] IN BLOOD BY AUTOMATED COUNT: 15.8 % (ref 10–15)
GLUCOSE BLDC GLUCOMTR-MCNC: 102 MG/DL (ref 70–99)
GLUCOSE BLDC GLUCOMTR-MCNC: 171 MG/DL (ref 70–99)
GLUCOSE SERPL-MCNC: 123 MG/DL (ref 70–99)
HCO3 SERPL-SCNC: 20 MMOL/L (ref 22–29)
HCT VFR BLD AUTO: 27.6 % (ref 35–47)
HGB BLD-MCNC: 8.5 G/DL (ref 11.7–15.7)
MCH RBC QN AUTO: 27.8 PG (ref 26.5–33)
MCHC RBC AUTO-ENTMCNC: 30.8 G/DL (ref 31.5–36.5)
MCV RBC AUTO: 90 FL (ref 78–100)
PLATELET # BLD AUTO: 340 10E3/UL (ref 150–450)
POTASSIUM SERPL-SCNC: 4.6 MMOL/L (ref 3.4–5.3)
RBC # BLD AUTO: 3.06 10E6/UL (ref 3.8–5.2)
SODIUM SERPL-SCNC: 140 MMOL/L (ref 135–145)
WBC # BLD AUTO: 6.6 10E3/UL (ref 4–11)

## 2025-01-10 PROCEDURE — 80048 BASIC METABOLIC PNL TOTAL CA: CPT | Performed by: STUDENT IN AN ORGANIZED HEALTH CARE EDUCATION/TRAINING PROGRAM

## 2025-01-10 PROCEDURE — 250N000013 HC RX MED GY IP 250 OP 250 PS 637: Performed by: INTERNAL MEDICINE

## 2025-01-10 PROCEDURE — 36415 COLL VENOUS BLD VENIPUNCTURE: CPT | Performed by: STUDENT IN AN ORGANIZED HEALTH CARE EDUCATION/TRAINING PROGRAM

## 2025-01-10 PROCEDURE — 250N000012 HC RX MED GY IP 250 OP 636 PS 637: Performed by: INTERNAL MEDICINE

## 2025-01-10 PROCEDURE — 99232 SBSQ HOSP IP/OBS MODERATE 35: CPT | Performed by: STUDENT IN AN ORGANIZED HEALTH CARE EDUCATION/TRAINING PROGRAM

## 2025-01-10 PROCEDURE — 82310 ASSAY OF CALCIUM: CPT | Performed by: STUDENT IN AN ORGANIZED HEALTH CARE EDUCATION/TRAINING PROGRAM

## 2025-01-10 PROCEDURE — 85027 COMPLETE CBC AUTOMATED: CPT | Performed by: STUDENT IN AN ORGANIZED HEALTH CARE EDUCATION/TRAINING PROGRAM

## 2025-01-10 PROCEDURE — 250N000011 HC RX IP 250 OP 636: Performed by: INTERNAL MEDICINE

## 2025-01-10 PROCEDURE — 97530 THERAPEUTIC ACTIVITIES: CPT | Mod: GP | Performed by: PHYSICAL THERAPIST

## 2025-01-10 PROCEDURE — 97116 GAIT TRAINING THERAPY: CPT | Mod: GP | Performed by: PHYSICAL THERAPIST

## 2025-01-10 PROCEDURE — 120N000001 HC R&B MED SURG/OB

## 2025-01-10 PROCEDURE — 87086 URINE CULTURE/COLONY COUNT: CPT | Performed by: STUDENT IN AN ORGANIZED HEALTH CARE EDUCATION/TRAINING PROGRAM

## 2025-01-10 PROCEDURE — 250N000013 HC RX MED GY IP 250 OP 250 PS 637: Performed by: STUDENT IN AN ORGANIZED HEALTH CARE EDUCATION/TRAINING PROGRAM

## 2025-01-10 PROCEDURE — 97110 THERAPEUTIC EXERCISES: CPT | Mod: GP | Performed by: PHYSICAL THERAPIST

## 2025-01-10 RX ADMIN — APIXABAN 2.5 MG: 2.5 TABLET, FILM COATED ORAL at 21:00

## 2025-01-10 RX ADMIN — EZETIMIBE 10 MG: 10 TABLET ORAL at 21:00

## 2025-01-10 RX ADMIN — MEROPENEM 1 G: 1 INJECTION, POWDER, FOR SOLUTION INTRAVENOUS at 13:25

## 2025-01-10 RX ADMIN — OMEGA-3-ACID ETHYL ESTERS 1 G: 1 CAPSULE, LIQUID FILLED ORAL at 21:00

## 2025-01-10 RX ADMIN — PREDNISONE 10 MG: 10 TABLET ORAL at 09:26

## 2025-01-10 RX ADMIN — SIROLIMUS 1 MG: 0.5 TABLET ORAL at 09:25

## 2025-01-10 RX ADMIN — SITAGLIPTIN 50 MG: 50 TABLET, FILM COATED ORAL at 09:26

## 2025-01-10 RX ADMIN — FOLIC ACID 1 MG: 1 TABLET ORAL at 09:26

## 2025-01-10 RX ADMIN — URSODIOL 250 MG: 250 TABLET ORAL at 09:25

## 2025-01-10 RX ADMIN — OMEGA-3-ACID ETHYL ESTERS 1 G: 1 CAPSULE, LIQUID FILLED ORAL at 09:26

## 2025-01-10 RX ADMIN — METOPROLOL SUCCINATE 25 MG: 25 TABLET, EXTENDED RELEASE ORAL at 09:26

## 2025-01-10 RX ADMIN — APIXABAN 2.5 MG: 2.5 TABLET, FILM COATED ORAL at 09:26

## 2025-01-10 RX ADMIN — LEVOTHYROXINE SODIUM 175 MCG: 150 TABLET ORAL at 09:26

## 2025-01-10 RX ADMIN — URSODIOL 250 MG: 250 TABLET ORAL at 21:00

## 2025-01-10 RX ADMIN — CALCITRIOL CAPSULES 0.25 MCG 0.25 MCG: 0.25 CAPSULE ORAL at 09:27

## 2025-01-10 RX ADMIN — GABAPENTIN 300 MG: 250 SUSPENSION ORAL at 21:01

## 2025-01-10 RX ADMIN — MEROPENEM 1 G: 1 INJECTION, POWDER, FOR SOLUTION INTRAVENOUS at 01:38

## 2025-01-10 ASSESSMENT — ACTIVITIES OF DAILY LIVING (ADL)
ADLS_ACUITY_SCORE: 58
ADLS_ACUITY_SCORE: 67
ADLS_ACUITY_SCORE: 67
ADLS_ACUITY_SCORE: 58
ADLS_ACUITY_SCORE: 67
ADLS_ACUITY_SCORE: 58
ADLS_ACUITY_SCORE: 67
ADLS_ACUITY_SCORE: 58
ADLS_ACUITY_SCORE: 55
ADLS_ACUITY_SCORE: 67
ADLS_ACUITY_SCORE: 67
ADLS_ACUITY_SCORE: 58
ADLS_ACUITY_SCORE: 58

## 2025-01-10 NOTE — PROGRESS NOTES
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 She was admitted with fever and generalized weakness. Recent admission with same issues, viral and culture workup negative. Appears to have new lung infiltrate that could represent pneumonia. Receiving empiric antibiotics, awaiting final culture results.     Fever-resolved  Right lowe lobe infiltrate aspiration vs HAP ( this was noted on CT scan from 12/10/24)  Patient presents for the 2nd time in less than a week with fever and prior admission work up was largely negative with continued negative blood cultures earlier.  CXR shows new airspace disease in RLL but there was mention of small ground glass opacities on the right lower lobe. Patient also endorsed bladder spasms morning of 1/9, which she states only happens when she has a UTI. Will culture urine from admission, although UA looked clean.   Blood cultures NGTD  Culturing urine as well  Continue meropenem for now  Patient declined SLP evaluation to rule out aspiration     Bilateral LE erythema  Venous stasis changes vs cellulitis  Appearance can be due to stasis dermatitis given its symmetry but in setting of ongoing fever, will monitor and treat for soft tissue infection as well.   Continue Meropenem     History of PBC  S/P liver transplant  Continue Urosdiol  Continue Prednisone  Continue Sirolimus, check levels     Hx of DVT  Continue apixaban     PAF  Continue Toprol XL 25 mg daily  Continue apixaban     Hypothyroidism  Continue Synthroid replacement     DM type II  Continue linagliptin  Mod CHO  "diet  Accucheck BID          Diet: Moderate Consistent Carb (60 g CHO per Meal) Diet    DVT Prophylaxis: DOAC  Ron Catheter: Not present  Lines: None     Cardiac Monitoring: ACTIVE order. Indication: Tachyarrhythmias, acute (48 hours)  Code Status: Full Code      Clinically Significant Risk Factors                   # Hypertension: Noted on problem list           # DMII: A1C = 6.9 % (Ref range: <5.7 %) within past 6 months, PRESENT ON ADMISSION  # Overweight: Estimated body mass index is 29.87 kg/m  as calculated from the following:    Height as of this encounter: 1.702 m (5' 7\").    Weight as of this encounter: 86.5 kg (190 lb 11.2 oz)., PRESENT ON ADMISSION            Social Drivers of Health    Housing Stability: Low Risk  (1/7/2025)    Housing Stability     Do you have housing? : Yes     Are you worried about losing your housing?: No   Recent Concern: Housing Stability - High Risk (10/12/2024)    Housing Stability     Do you have housing? : No     Are you worried about losing your housing?: No   Tobacco Use: Medium Risk (1/2/2025)    Patient History     Smoking Tobacco Use: Former     Smokeless Tobacco Use: Never   Physical Activity: Insufficiently Active (11/9/2023)    [facility]    Exercise Vital Sign     Days of Exercise per Week: 5 days     Minutes of Exercise per Session: 10 min          Disposition Plan     Medically Ready for Discharge: Anticipated Tomorrow             Jose Renee MD  Hospitalist Service  [facility]  Securely message with [employer] (more info)  Text page via [employer] Paging/Directory   ______________________________________________________________________    Interval History   NAEO.  Feeling better today, not feeling feverish, not significant cough.    Physical Exam   Vital Signs: Temp: 97.8  F (36.6  C) Temp src: Oral BP: 118/49 Pulse: 62   Resp: 16 SpO2: 93 % O2 Device: None (Room air) Oxygen Delivery: 2 LPM  Weight: 190 lbs 11.17 " oz    Constitutional: Sitting in bed, no acute distress  Respiratory: Lungs clear, normal WOB, room air  Cardiovascular: RRR, no MRGs  GI: Soft, non-tender, non-distended  Neurologic: AAOx3, mentating clearly    Medical Decision Making       45 MINUTES SPENT BY ME on the date of service doing chart review, history, exam, documentation & further activities per the note.      Data     I have personally reviewed the following data over the past 24 hrs:    6.6  \   8.5 (L)   / 340     140 107 32.7 (H) /  102 (H)   4.6 20 (L) 1.46 (H) \       Imaging results reviewed over the past 24 hrs:   No results found for this or any previous visit (from the past 24 hours).

## 2025-01-10 NOTE — PLAN OF CARE
"Goal Outcome Evaluation:      Plan of Care Reviewed With: patient    Overall Patient Progress: improving Overall Patient Progress: improving    Outcome Evaluation: IV merrem, on RA, temp 97.8    To Do:  End of Shift Summary  For vital signs and complete assessments, please see documentation flowsheets.     Pertinent assessments: A&Ox4. VSS on RA. Temp today 97.8. VSS on room air. Pt states her normal temperature is 97.5, and wants to get back to that temp before discharging. LS diminished. Tolerating a mod CHO diet.  this AM. Up with assist of 1 with GB and cane. Saline locked PIV.     Major Shift Events: uneventful     Treatment Plan: IV Merrem, PO prednisone, Eliquis BID, BG monitoring, telemetry, isolation precautions, and discharge pending.    Bedside Nurse: Jah Hidalgo RN          Problem: Adult Inpatient Plan of Care  Goal: Plan of Care Review  Description: The Plan of Care Review/Shift note should be completed every shift.  The Outcome Evaluation is a brief statement about your assessment that the patient is improving, declining, or no change.  This information will be displayed automatically on your shift  note.  Outcome: Progressing  Flowsheets (Taken 1/10/2025 1435)  Outcome Evaluation: IV merrem, on RA, temp 97.8  Plan of Care Reviewed With: patient  Overall Patient Progress: improving  Goal: Patient-Specific Goal (Individualized)  Description: You can add care plan individualizations to a care plan. Examples of Individualization might be:  \"Parent requests to be called daily at 9am for status\", \"I have a hard time hearing out of my right ear\", or \"Do not touch me to wake me up as it startles  me\".  Outcome: Progressing  Goal: Absence of Hospital-Acquired Illness or Injury  Outcome: Progressing  Intervention: Identify and Manage Fall Risk  Recent Flowsheet Documentation  Taken 1/10/2025 1100 by Jah Hidalgo, RN  Safety Promotion/Fall Prevention:   activity supervised   clutter free environment " maintained   increased rounding and observation   nonskid shoes/slippers when out of bed   patient and family education   room door open   room near nurse's station   room organization consistent   safety round/check completed  Intervention: Prevent Skin Injury  Recent Flowsheet Documentation  Taken 1/10/2025 1100 by Jah Hidalgo RN  Body Position: position changed independently  Intervention: Prevent and Manage VTE (Venous Thromboembolism) Risk  Recent Flowsheet Documentation  Taken 1/10/2025 1100 by Jah Hidalgo RN  VTE Prevention/Management: SCDs off (sequential compression devices)  Intervention: Prevent Infection  Recent Flowsheet Documentation  Taken 1/10/2025 1100 by Jah Hidalgo RN  Infection Prevention:   cohorting utilized   hand hygiene promoted   rest/sleep promoted   single patient room provided  Goal: Optimal Comfort and Wellbeing  Outcome: Progressing  Goal: Readiness for Transition of Care  Outcome: Progressing

## 2025-01-10 NOTE — PLAN OF CARE
"Pertinent assessments: A&Ox4. On 2L NC at sleep. Tolerating a mod CHO diet. Pt's x2 BG checks done on day shift. Up with assist of 1, gait belt, and a cane. Saline locked PIV.     Major Shift Events: none.    Treatment Plan: IV Merrem, PO prednisone, Eliquis BID, BG monitoring, telemetry, isolation precautions, and discharge pending.    Bedside Nurse: Shashank Ortiz RN    Problem: Adult Inpatient Plan of Care  Goal: Plan of Care Review  Description: The Plan of Care Review/Shift note should be completed every shift.  The Outcome Evaluation is a brief statement about your assessment that the patient is improving, declining, or no change.  This information will be displayed automatically on your shift  note.  Outcome: Progressing  Flowsheets (Taken 1/9/2025 2257)  Outcome Evaluation: Continue IV Merrem. Dishcarge pending.  Plan of Care Reviewed With: patient  Overall Patient Progress: improving  Goal: Patient-Specific Goal (Individualized)  Description: You can add care plan individualizations to a care plan. Examples of Individualization might be:  \"Parent requests to be called daily at 9am for status\", \"I have a hard time hearing out of my right ear\", or \"Do not touch me to wake me up as it startles  me\".  Outcome: Progressing  Goal: Absence of Hospital-Acquired Illness or Injury  Outcome: Progressing  Intervention: Identify and Manage Fall Risk  Recent Flowsheet Documentation  Taken 1/9/2025 8423 by Shashank Ortiz RN  Safety Promotion/Fall Prevention:   activity supervised   clutter free environment maintained   increase visualization of patient   lighting adjusted   nonskid shoes/slippers when out of bed   patient and family education   room near nurse's station   safety round/check completed   supervised activity   treat reversible contributory factors  Intervention: Prevent Skin Injury  Recent Flowsheet Documentation  Taken 1/9/2025 1553 by Shashank Ortiz RN  Body Position: position changed " independently  Intervention: Prevent and Manage VTE (Venous Thromboembolism) Risk  Recent Flowsheet Documentation  Taken 1/9/2025 1553 by Shashank Ortiz, RN  VTE Prevention/Management: SCDs off (sequential compression devices)  Intervention: Prevent Infection  Recent Flowsheet Documentation  Taken 1/9/2025 1553 by Shashank Ortiz, RN  Infection Prevention:   cohorting utilized   hand hygiene promoted   rest/sleep promoted   single patient room provided  Goal: Optimal Comfort and Wellbeing  Outcome: Progressing  Goal: Readiness for Transition of Care  Outcome: Progressing     Goal Outcome Evaluation:      Plan of Care Reviewed With: patient    Overall Patient Progress: improving    Overall Patient Progress: improving    Outcome Evaluation: Continue IV Merrem. Dishcarge pending.

## 2025-01-11 LAB
ANION GAP SERPL CALCULATED.3IONS-SCNC: 14 MMOL/L (ref 7–15)
BACTERIA UR CULT: NORMAL
BUN SERPL-MCNC: 36.4 MG/DL (ref 8–23)
CALCIUM SERPL-MCNC: 9.3 MG/DL (ref 8.8–10.4)
CHLORIDE SERPL-SCNC: 109 MMOL/L (ref 98–107)
CREAT SERPL-MCNC: 1.47 MG/DL (ref 0.51–0.95)
EGFRCR SERPLBLD CKD-EPI 2021: 37 ML/MIN/1.73M2
ERYTHROCYTE [DISTWIDTH] IN BLOOD BY AUTOMATED COUNT: 15.6 % (ref 10–15)
GLUCOSE BLDC GLUCOMTR-MCNC: 103 MG/DL (ref 70–99)
GLUCOSE BLDC GLUCOMTR-MCNC: 231 MG/DL (ref 70–99)
GLUCOSE SERPL-MCNC: 91 MG/DL (ref 70–99)
HCO3 SERPL-SCNC: 20 MMOL/L (ref 22–29)
HCT VFR BLD AUTO: 29.4 % (ref 35–47)
HGB BLD-MCNC: 9.2 G/DL (ref 11.7–15.7)
MCH RBC QN AUTO: 28 PG (ref 26.5–33)
MCHC RBC AUTO-ENTMCNC: 31.3 G/DL (ref 31.5–36.5)
MCV RBC AUTO: 90 FL (ref 78–100)
PLATELET # BLD AUTO: 377 10E3/UL (ref 150–450)
POTASSIUM SERPL-SCNC: 4.2 MMOL/L (ref 3.4–5.3)
RBC # BLD AUTO: 3.28 10E6/UL (ref 3.8–5.2)
SODIUM SERPL-SCNC: 143 MMOL/L (ref 135–145)
WBC # BLD AUTO: 7 10E3/UL (ref 4–11)

## 2025-01-11 PROCEDURE — 250N000013 HC RX MED GY IP 250 OP 250 PS 637: Performed by: INTERNAL MEDICINE

## 2025-01-11 PROCEDURE — 250N000011 HC RX IP 250 OP 636: Performed by: INTERNAL MEDICINE

## 2025-01-11 PROCEDURE — 80048 BASIC METABOLIC PNL TOTAL CA: CPT | Performed by: STUDENT IN AN ORGANIZED HEALTH CARE EDUCATION/TRAINING PROGRAM

## 2025-01-11 PROCEDURE — 250N000013 HC RX MED GY IP 250 OP 250 PS 637: Performed by: STUDENT IN AN ORGANIZED HEALTH CARE EDUCATION/TRAINING PROGRAM

## 2025-01-11 PROCEDURE — 85027 COMPLETE CBC AUTOMATED: CPT | Performed by: STUDENT IN AN ORGANIZED HEALTH CARE EDUCATION/TRAINING PROGRAM

## 2025-01-11 PROCEDURE — 36415 COLL VENOUS BLD VENIPUNCTURE: CPT | Performed by: STUDENT IN AN ORGANIZED HEALTH CARE EDUCATION/TRAINING PROGRAM

## 2025-01-11 PROCEDURE — 250N000012 HC RX MED GY IP 250 OP 636 PS 637: Performed by: INTERNAL MEDICINE

## 2025-01-11 PROCEDURE — 120N000001 HC R&B MED SURG/OB

## 2025-01-11 PROCEDURE — 99232 SBSQ HOSP IP/OBS MODERATE 35: CPT | Performed by: STUDENT IN AN ORGANIZED HEALTH CARE EDUCATION/TRAINING PROGRAM

## 2025-01-11 RX ADMIN — OMEGA-3-ACID ETHYL ESTERS 1 G: 1 CAPSULE, LIQUID FILLED ORAL at 21:05

## 2025-01-11 RX ADMIN — URSODIOL 250 MG: 250 TABLET ORAL at 09:57

## 2025-01-11 RX ADMIN — GABAPENTIN 300 MG: 250 SUSPENSION ORAL at 21:07

## 2025-01-11 RX ADMIN — EZETIMIBE 10 MG: 10 TABLET ORAL at 21:05

## 2025-01-11 RX ADMIN — MEROPENEM 1 G: 1 INJECTION, POWDER, FOR SOLUTION INTRAVENOUS at 02:43

## 2025-01-11 RX ADMIN — FOLIC ACID 1 MG: 1 TABLET ORAL at 09:57

## 2025-01-11 RX ADMIN — CALCITRIOL CAPSULES 0.25 MCG 0.25 MCG: 0.25 CAPSULE ORAL at 09:57

## 2025-01-11 RX ADMIN — OMEGA-3-ACID ETHYL ESTERS 1 G: 1 CAPSULE, LIQUID FILLED ORAL at 09:58

## 2025-01-11 RX ADMIN — AMLODIPINE BESYLATE 2.5 MG: 2.5 TABLET ORAL at 15:59

## 2025-01-11 RX ADMIN — METOPROLOL SUCCINATE 25 MG: 25 TABLET, EXTENDED RELEASE ORAL at 09:56

## 2025-01-11 RX ADMIN — APIXABAN 2.5 MG: 2.5 TABLET, FILM COATED ORAL at 21:06

## 2025-01-11 RX ADMIN — SIROLIMUS 1 MG: 0.5 TABLET ORAL at 09:57

## 2025-01-11 RX ADMIN — LEVOTHYROXINE SODIUM 175 MCG: 150 TABLET ORAL at 09:55

## 2025-01-11 RX ADMIN — APIXABAN 2.5 MG: 2.5 TABLET, FILM COATED ORAL at 09:57

## 2025-01-11 RX ADMIN — SITAGLIPTIN 50 MG: 50 TABLET, FILM COATED ORAL at 09:56

## 2025-01-11 RX ADMIN — URSODIOL 250 MG: 250 TABLET ORAL at 21:05

## 2025-01-11 RX ADMIN — PREDNISONE 10 MG: 10 TABLET ORAL at 09:57

## 2025-01-11 RX ADMIN — ACETAMINOPHEN 650 MG: 325 TABLET, FILM COATED ORAL at 22:58

## 2025-01-11 RX ADMIN — MEROPENEM 1 G: 1 INJECTION, POWDER, FOR SOLUTION INTRAVENOUS at 13:17

## 2025-01-11 ASSESSMENT — ACTIVITIES OF DAILY LIVING (ADL)
ADLS_ACUITY_SCORE: 55
ADLS_ACUITY_SCORE: 58
ADLS_ACUITY_SCORE: 55
ADLS_ACUITY_SCORE: 58
ADLS_ACUITY_SCORE: 55
ADLS_ACUITY_SCORE: 58
ADLS_ACUITY_SCORE: 55
ADLS_ACUITY_SCORE: 58
ADLS_ACUITY_SCORE: 55
ADLS_ACUITY_SCORE: 55
ADLS_ACUITY_SCORE: 58
ADLS_ACUITY_SCORE: 58
ADLS_ACUITY_SCORE: 55
ADLS_ACUITY_SCORE: 58
ADLS_ACUITY_SCORE: 55
ADLS_ACUITY_SCORE: 58
ADLS_ACUITY_SCORE: 55
ADLS_ACUITY_SCORE: 55
ADLS_ACUITY_SCORE: 58

## 2025-01-11 NOTE — PLAN OF CARE
"Pertinent assessments: A&Ox4. On room air while awake. On 2L NC at sleep. Tolerating a mod CHO diet. BID BG assessments. Up with assist of 1, gait belt, and a cane. Saline locked PIV.      Major Shift Events: tele monitoring discontinued.     Treatment Plan: IV Merrem, PO prednisone, Eliquis BID, BG monitoring, isolation precautions, and discharge pending.     Bedside Nurse: Shashank Ortiz RN    Problem: Adult Inpatient Plan of Care  Goal: Plan of Care Review  Description: The Plan of Care Review/Shift note should be completed every shift.  The Outcome Evaluation is a brief statement about your assessment that the patient is improving, declining, or no change.  This information will be displayed automatically on your shift  note.  Outcome: Progressing  Flowsheets (Taken 1/10/2025 2309)  Outcome Evaluation: Continue IV Merrem.  Plan of Care Reviewed With: patient  Overall Patient Progress: improving  Goal: Patient-Specific Goal (Individualized)  Description: You can add care plan individualizations to a care plan. Examples of Individualization might be:  \"Parent requests to be called daily at 9am for status\", \"I have a hard time hearing out of my right ear\", or \"Do not touch me to wake me up as it startles  me\".  Outcome: Progressing  Goal: Absence of Hospital-Acquired Illness or Injury  Outcome: Progressing  Intervention: Identify and Manage Fall Risk  Recent Flowsheet Documentation  Taken 1/10/2025 1721 by Shashank Ortiz RN  Safety Promotion/Fall Prevention:   activity supervised   clutter free environment maintained   increase visualization of patient   lighting adjusted   nonskid shoes/slippers when out of bed   patient and family education   room near nurse's station   safety round/check completed   supervised activity   treat reversible contributory factors  Intervention: Prevent Skin Injury  Recent Flowsheet Documentation  Taken 1/10/2025 1721 by Shashank Ortiz RN  Body Position: position changed " independently  Intervention: Prevent and Manage VTE (Venous Thromboembolism) Risk  Recent Flowsheet Documentation  Taken 1/10/2025 1721 by Shashank Ortiz RN  VTE Prevention/Management: SCDs off (sequential compression devices)  Intervention: Prevent Infection  Recent Flowsheet Documentation  Taken 1/10/2025 1721 by Shashank Ortiz RN  Infection Prevention:   cohorting utilized   hand hygiene promoted   rest/sleep promoted   single patient room provided   personal protective equipment utilized   equipment surfaces disinfected  Goal: Optimal Comfort and Wellbeing  Outcome: Progressing  Goal: Readiness for Transition of Care  Outcome: Progressing     Goal Outcome Evaluation:      Plan of Care Reviewed With: patient    Overall Patient Progress: improving    Overall Patient Progress: improving    Outcome Evaluation: Continue IV Merrem.

## 2025-01-11 NOTE — PLAN OF CARE
"Goal Outcome Evaluation:      Plan of Care Reviewed With: patient    Overall Patient Progress: improving Overall Patient Progress: improving    Outcome Evaluation: IV abx, on RA, afebrile    To Do:  End of Shift Summary  For vital signs and complete assessments, please see documentation flowsheets.    Pertinent assessments: Pt A&Ox4. VSS on RA. Remains afebrile, temp this morning was 98.7. Ax1 with cane in room. Denies pain & nausea. New PIV placed. Merrem continued. Will likely go home tomorrow.  this AM.      Major Shift Events: uneventful    Treatment Plan: Contact precautions. IV Merrem. Monitor BG. Potential discharge tomorrow.    Bedside Nurse: Jah Hidalgo RN          Problem: Adult Inpatient Plan of Care  Goal: Plan of Care Review  Description: The Plan of Care Review/Shift note should be completed every shift.  The Outcome Evaluation is a brief statement about your assessment that the patient is improving, declining, or no change.  This information will be displayed automatically on your shift  note.  Outcome: Progressing  Flowsheets (Taken 1/11/2025 1445)  Outcome Evaluation: IV abx, on RA, afebrile  Plan of Care Reviewed With: patient  Overall Patient Progress: improving  Goal: Patient-Specific Goal (Individualized)  Description: You can add care plan individualizations to a care plan. Examples of Individualization might be:  \"Parent requests to be called daily at 9am for status\", \"I have a hard time hearing out of my right ear\", or \"Do not touch me to wake me up as it startles  me\".  Outcome: Progressing  Goal: Absence of Hospital-Acquired Illness or Injury  Outcome: Progressing  Intervention: Identify and Manage Fall Risk  Recent Flowsheet Documentation  Taken 1/11/2025 1000 by Jah Hidalgo, RN  Safety Promotion/Fall Prevention:   activity supervised   assistive device/personal items within reach   clutter free environment maintained   increase visualization of patient   lighting adjusted   " nonskid shoes/slippers when out of bed   room near nurse's station   room organization consistent   safety round/check completed   supervised activity  Intervention: Prevent and Manage VTE (Venous Thromboembolism) Risk  Recent Flowsheet Documentation  Taken 1/11/2025 1000 by Jah Hidalgo RN  VTE Prevention/Management: SCDs off (sequential compression devices)  Intervention: Prevent Infection  Recent Flowsheet Documentation  Taken 1/11/2025 1000 by Jah Hidalgo, RN  Infection Prevention:   cohorting utilized   hand hygiene promoted   rest/sleep promoted   single patient room provided  Goal: Optimal Comfort and Wellbeing  Outcome: Progressing  Goal: Readiness for Transition of Care  Outcome: Progressing     Problem: Skin Injury Risk Increased  Goal: Skin Health and Integrity  Outcome: Progressing  Intervention: Plan: Nurse Driven Intervention: Moisture Management  Recent Flowsheet Documentation  Taken 1/11/2025 1000 by Jah Hidalgo RN  Moisture Interventions:   Encourage regular toileting   Incontinence pad     Problem: Gas Exchange Impaired  Goal: Optimal Gas Exchange  Outcome: Progressing

## 2025-01-11 NOTE — PROGRESS NOTES
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 She was admitted with fever and generalized weakness. Recent admission with same issues, viral and culture workup negative. Appears to have new lung infiltrate that could represent pneumonia. Receiving empiric antibiotics, awaiting final culture results.    Fever-resolved  Right lowe lobe infiltrate aspiration vs HAP ( this was noted on CT scan from 12/10/24)  Patient presented for the 2nd time in less than a week with fever and prior admission work up was largely negative with continued negative blood cultures earlier.  CXR shows new airspace disease in RLL but there was mention of small ground glass opacities on the right lower lobe. Patient also endorsed bladder spasms morning of 1/9, which she states only happens when she has a UTI. Will culture urine from admission, although UA looked clean.   Blood cultures NGTD  Culturing urine as well, no growth to date  Continue meropenem for now given Hx of resistant organism infection  Patient declined SLP evaluation to rule out aspiration     Bilateral LE erythema  Venous stasis changes vs cellulitis  Appearance can be due to stasis dermatitis given its symmetry but in setting of ongoing fever, will monitor and treat for soft tissue infection as well.   Continue Meropenem as above      History of PBC  S/P liver transplant  Continue Urosdiol  Continue Prednisone  Continue Sirolimus, check levels     Hx of DVT  Continue apixaban     PAF  Continue Toprol XL 25 mg daily  Continue apixaban     Hypothyroidism  Continue Synthroid  "replacement     DM type II  Continue linagliptin  Mod CHO diet  Accucheck BID          Diet: Moderate Consistent Carb (60 g CHO per Meal) Diet    DVT Prophylaxis: DOAC  Ron Catheter: Not present  Lines: None     Cardiac Monitoring: None  Code Status: Full Code      Clinically Significant Risk Factors          # Hyperchloremia: Highest Cl = 109 mmol/L in last 2 days, will monitor as appropriate              # Hypertension: Noted on problem list           # DMII: A1C = 6.9 % (Ref range: <5.7 %) within past 6 months   # Overweight: Estimated body mass index is 29.87 kg/m  as calculated from the following:    Height as of this encounter: 1.702 m (5' 7\").    Weight as of this encounter: 86.5 kg (190 lb 11.2 oz).             Social Drivers of Health    Housing Stability: Low Risk  (1/7/2025)    Housing Stability     Do you have housing? : Yes     Are you worried about losing your housing?: No   Recent Concern: Housing Stability - High Risk (10/12/2024)    Housing Stability     Do you have housing? : No     Are you worried about losing your housing?: No   Tobacco Use: Medium Risk (1/2/2025)    Patient History     Smoking Tobacco Use: Former     Smokeless Tobacco Use: Never   Physical Activity: Insufficiently Active (11/9/2023)    Received from [facility]    Exercise Vital Sign     Days of Exercise per Week: 5 days     Minutes of Exercise per Session: 10 min          Disposition Plan     Medically Ready for Discharge: Anticipated Tomorrow             Jose Renee MD  Hospitalist Service  [facility]  Securely message with [employer] (more info)  Text page via [employer] Paging/Directory   ______________________________________________________________________    Interval History   NAEO.  Continues to feel well.  Clearing some pulmonary secretions, otherwise asymptomatic.    Physical Exam   Vital Signs: Temp: 98.7  F (37.1  C) Temp src: Oral BP: 113/43 Pulse: 68   Resp: 16 SpO2: 95 % O2 Device: Nasal cannula " Oxygen Delivery: 2 LPM  Weight: 190 lbs 11.17 oz    Constitutional: Sitting in bed, no acute distress  Respiratory: Lungs clear, normal WOB, room air  Cardiovascular: RRR, no MRGs  GI: Soft, non-tender, non-distended  Neurologic: AAOx3, mentating clearly    Medical Decision Making       45 MINUTES SPENT BY ME on the date of service doing chart review, history, exam, documentation & further activities per the note.      Data     I have personally reviewed the following data over the past 24 hrs:    7.0  \   9.2 (L)   / 377     143 109 (H) 36.4 (H) /  91   4.2 20 (L) 1.47 (H) \       Imaging results reviewed over the past 24 hrs:   No results found for this or any previous visit (from the past 24 hours).

## 2025-01-11 NOTE — PLAN OF CARE
"For vital signs and complete assessments, please see documentation flowsheets.     [id number]  Pertinent assessments: Pt A&Ox4. Ax1 with cane in room. On 2LNC. Afebrile.Elevated BP, other VSS. Denies pain & nausea. PIV saline locked.    Major Shift Events: none    Treatment Plan: Contact precautions. O2 support. IV Merrem. Monitor BG. Discharge TBD.    Bedside Nurse: Charanjit Tristan RN     Problem: Adult Inpatient Plan of Care  Goal: Plan of Care Review  Description: The Plan of Care Review/Shift note should be completed every shift.  The Outcome Evaluation is a brief statement about your assessment that the patient is improving, declining, or no change.  This information will be displayed automatically on your shift  note.  1/11/2025 0701 by Charanjit Tristan RN  Outcome: Progressing  Flowsheets (Taken 1/11/2025 0701)  Plan of Care Reviewed With: patient  Overall Patient Progress: improving  1/11/2025 0700 by Charanjit Tristan RN  Outcome: Progressing  Flowsheets (Taken 1/11/2025 0700)  Outcome Evaluation: IV abx & O2 support continued. Denies pain. PIV saline locked.  Plan of Care Reviewed With: patient  Goal: Patient-Specific Goal (Individualized)  Description: You can add care plan individualizations to a care plan. Examples of Individualization might be:  \"Parent requests to be called daily at 9am for status\", \"I have a hard time hearing out of my right ear\", or \"Do not touch me to wake me up as it startles  me\".  1/11/2025 0701 by Charanjit Tristan RN  Outcome: Progressing  1/11/2025 0700 by Charanjit Tristan RN  Outcome: Progressing  Goal: Absence of Hospital-Acquired Illness or Injury  1/11/2025 0701 by Charanjit Tristan RN  Outcome: Progressing  1/11/2025 0700 by Charanjit Tristan RN  Outcome: Progressing  Intervention: Identify and Manage Fall Risk  Recent Flowsheet Documentation  Taken 1/11/2025 0238 by Charanjit Tristan, RN  Safety Promotion/Fall Prevention:   activity supervised   assistive " device/personal items within reach   clutter free environment maintained   increase visualization of patient   lighting adjusted   nonskid shoes/slippers when out of bed   room near nurse's station   room organization consistent   safety round/check completed   supervised activity   treat reversible contributory factors  Intervention: Prevent Skin Injury  Recent Flowsheet Documentation  Taken 1/11/2025 0238 by Charanjit Tristan RN  Body Position: position changed independently  Intervention: Prevent and Manage VTE (Venous Thromboembolism) Risk  Recent Flowsheet Documentation  Taken 1/11/2025 0238 by Charanjit Tristan RN  VTE Prevention/Management: SCDs off (sequential compression devices)  Intervention: Prevent Infection  Recent Flowsheet Documentation  Taken 1/11/2025 0238 by Charanjit Tristan RN  Infection Prevention:   cohorting utilized   hand hygiene promoted   rest/sleep promoted   single patient room provided   personal protective equipment utilized  Goal: Optimal Comfort and Wellbeing  1/11/2025 0701 by Charanjit Tristan RN  Outcome: Progressing  1/11/2025 0700 by Charanjit Tristan RN  Outcome: Progressing  Goal: Readiness for Transition of Care  1/11/2025 0701 by Charanjit Tristan RN  Outcome: Progressing  1/11/2025 0700 by Charanjit Tristan RN  Outcome: Progressing     Problem: Skin Injury Risk Increased  Goal: Skin Health and Integrity  1/11/2025 0701 by Charanjit Tristan RN  Outcome: Progressing  1/11/2025 0700 by Charanjit Tristan RN  Outcome: Progressing     Problem: Gas Exchange Impaired  Goal: Optimal Gas Exchange  1/11/2025 0701 by Charanjit Tristan RN  Outcome: Progressing  1/11/2025 0700 by Charanjit Tristan RN  Outcome: Progressing     Problem: Adult Inpatient Plan of Care  Goal: Optimal Comfort and Wellbeing  1/11/2025 0701 by Charanjit Tristan RN  Outcome: Progressing  1/11/2025 0700 by Charanjit Tristan RN  Outcome: Progressing     Goal Outcome Evaluation:      Plan of Care Reviewed With:  patient    Overall Patient Progress: improvingOverall Patient Progress: improving    Outcome Evaluation: IV abx & O2 support continued. Denies pain. PIV saline locked.

## 2025-01-12 LAB
ANION GAP SERPL CALCULATED.3IONS-SCNC: 11 MMOL/L (ref 7–15)
BACTERIA BLD CULT: NO GROWTH
BACTERIA BLD CULT: NO GROWTH
BUN SERPL-MCNC: 36.9 MG/DL (ref 8–23)
CALCIUM SERPL-MCNC: 8.9 MG/DL (ref 8.8–10.4)
CHLORIDE SERPL-SCNC: 109 MMOL/L (ref 98–107)
CREAT SERPL-MCNC: 1.41 MG/DL (ref 0.51–0.95)
EGFRCR SERPLBLD CKD-EPI 2021: 39 ML/MIN/1.73M2
ERYTHROCYTE [DISTWIDTH] IN BLOOD BY AUTOMATED COUNT: 15.4 % (ref 10–15)
GLUCOSE BLDC GLUCOMTR-MCNC: 117 MG/DL (ref 70–99)
GLUCOSE BLDC GLUCOMTR-MCNC: 156 MG/DL (ref 70–99)
GLUCOSE SERPL-MCNC: 121 MG/DL (ref 70–99)
HCO3 SERPL-SCNC: 20 MMOL/L (ref 22–29)
HCT VFR BLD AUTO: 26.4 % (ref 35–47)
HGB BLD-MCNC: 8.2 G/DL (ref 11.7–15.7)
MCH RBC QN AUTO: 28.1 PG (ref 26.5–33)
MCHC RBC AUTO-ENTMCNC: 31.1 G/DL (ref 31.5–36.5)
MCV RBC AUTO: 90 FL (ref 78–100)
PLATELET # BLD AUTO: 361 10E3/UL (ref 150–450)
POTASSIUM SERPL-SCNC: 4.1 MMOL/L (ref 3.4–5.3)
RBC # BLD AUTO: 2.92 10E6/UL (ref 3.8–5.2)
SODIUM SERPL-SCNC: 140 MMOL/L (ref 135–145)
WBC # BLD AUTO: 6.1 10E3/UL (ref 4–11)

## 2025-01-12 PROCEDURE — 99232 SBSQ HOSP IP/OBS MODERATE 35: CPT | Performed by: STUDENT IN AN ORGANIZED HEALTH CARE EDUCATION/TRAINING PROGRAM

## 2025-01-12 PROCEDURE — 250N000013 HC RX MED GY IP 250 OP 250 PS 637: Performed by: STUDENT IN AN ORGANIZED HEALTH CARE EDUCATION/TRAINING PROGRAM

## 2025-01-12 PROCEDURE — 85048 AUTOMATED LEUKOCYTE COUNT: CPT | Performed by: STUDENT IN AN ORGANIZED HEALTH CARE EDUCATION/TRAINING PROGRAM

## 2025-01-12 PROCEDURE — 80048 BASIC METABOLIC PNL TOTAL CA: CPT | Performed by: STUDENT IN AN ORGANIZED HEALTH CARE EDUCATION/TRAINING PROGRAM

## 2025-01-12 PROCEDURE — 36415 COLL VENOUS BLD VENIPUNCTURE: CPT | Performed by: STUDENT IN AN ORGANIZED HEALTH CARE EDUCATION/TRAINING PROGRAM

## 2025-01-12 PROCEDURE — 250N000013 HC RX MED GY IP 250 OP 250 PS 637: Performed by: INTERNAL MEDICINE

## 2025-01-12 PROCEDURE — 120N000001 HC R&B MED SURG/OB

## 2025-01-12 PROCEDURE — 250N000012 HC RX MED GY IP 250 OP 636 PS 637: Performed by: INTERNAL MEDICINE

## 2025-01-12 PROCEDURE — 250N000011 HC RX IP 250 OP 636: Performed by: INTERNAL MEDICINE

## 2025-01-12 PROCEDURE — 85014 HEMATOCRIT: CPT | Performed by: STUDENT IN AN ORGANIZED HEALTH CARE EDUCATION/TRAINING PROGRAM

## 2025-01-12 RX ORDER — AMLODIPINE BESYLATE 5 MG/1
5 TABLET ORAL DAILY
Status: DISCONTINUED | OUTPATIENT
Start: 2025-01-13 | End: 2025-01-14 | Stop reason: HOSPADM

## 2025-01-12 RX ADMIN — EZETIMIBE 10 MG: 10 TABLET ORAL at 21:09

## 2025-01-12 RX ADMIN — URSODIOL 250 MG: 250 TABLET ORAL at 09:19

## 2025-01-12 RX ADMIN — APIXABAN 2.5 MG: 2.5 TABLET, FILM COATED ORAL at 09:19

## 2025-01-12 RX ADMIN — MEROPENEM 1 G: 1 INJECTION, POWDER, FOR SOLUTION INTRAVENOUS at 14:28

## 2025-01-12 RX ADMIN — LEVOTHYROXINE SODIUM 175 MCG: 150 TABLET ORAL at 09:18

## 2025-01-12 RX ADMIN — FOLIC ACID 1 MG: 1 TABLET ORAL at 09:18

## 2025-01-12 RX ADMIN — SIROLIMUS 1 MG: 0.5 TABLET ORAL at 09:19

## 2025-01-12 RX ADMIN — APIXABAN 2.5 MG: 2.5 TABLET, FILM COATED ORAL at 21:09

## 2025-01-12 RX ADMIN — GABAPENTIN 300 MG: 250 SUSPENSION ORAL at 21:11

## 2025-01-12 RX ADMIN — OMEGA-3-ACID ETHYL ESTERS 1 G: 1 CAPSULE, LIQUID FILLED ORAL at 09:19

## 2025-01-12 RX ADMIN — Medication 1 EACH: at 09:22

## 2025-01-12 RX ADMIN — CALCITRIOL CAPSULES 0.25 MCG 0.25 MCG: 0.25 CAPSULE ORAL at 09:19

## 2025-01-12 RX ADMIN — SITAGLIPTIN 50 MG: 50 TABLET, FILM COATED ORAL at 09:18

## 2025-01-12 RX ADMIN — METOPROLOL SUCCINATE 25 MG: 25 TABLET, EXTENDED RELEASE ORAL at 09:19

## 2025-01-12 RX ADMIN — MEROPENEM 1 G: 1 INJECTION, POWDER, FOR SOLUTION INTRAVENOUS at 02:28

## 2025-01-12 RX ADMIN — URSODIOL 250 MG: 250 TABLET ORAL at 21:09

## 2025-01-12 RX ADMIN — PREDNISONE 10 MG: 10 TABLET ORAL at 09:18

## 2025-01-12 RX ADMIN — OMEGA-3-ACID ETHYL ESTERS 1 G: 1 CAPSULE, LIQUID FILLED ORAL at 21:09

## 2025-01-12 RX ADMIN — AMLODIPINE BESYLATE 2.5 MG: 2.5 TABLET ORAL at 09:18

## 2025-01-12 ASSESSMENT — ACTIVITIES OF DAILY LIVING (ADL)
ADLS_ACUITY_SCORE: 55
ADLS_ACUITY_SCORE: 58
ADLS_ACUITY_SCORE: 55
ADLS_ACUITY_SCORE: 58
ADLS_ACUITY_SCORE: 58
ADLS_ACUITY_SCORE: 55
ADLS_ACUITY_SCORE: 58
ADLS_ACUITY_SCORE: 55

## 2025-01-12 NOTE — PLAN OF CARE
"Pertinent assessments: A&O, up sba with cane in room. On RA, uses o2 while sleeping. Denies pain, or nausea, sob with exertion. Voiding freely, having regular bms.     Major Shift Events Uneventful    Treatment Plan: Cont IV meropenem.     Bedside Nurse: Mary Anne Moss RN     Problem: Adult Inpatient Plan of Care  Goal: Plan of Care Review  Description: The Plan of Care Review/Shift note should be completed every shift.  The Outcome Evaluation is a brief statement about your assessment that the patient is improving, declining, or no change.  This information will be displayed automatically on your shift  note.  Outcome: Progressing  Flowsheets (Taken 1/12/2025 1441)  Outcome Evaluation: Treatment Plan: Cont IV meropenem.  Plan of Care Reviewed With: patient  Overall Patient Progress: improving  Goal: Patient-Specific Goal (Individualized)  Description: You can add care plan individualizations to a care plan. Examples of Individualization might be:  \"Parent requests to be called daily at 9am for status\", \"I have a hard time hearing out of my right ear\", or \"Do not touch me to wake me up as it startles  me\".  Outcome: Progressing  Goal: Absence of Hospital-Acquired Illness or Injury  Outcome: Progressing  Intervention: Identify and Manage Fall Risk  Recent Flowsheet Documentation  Taken 1/12/2025 0943 by Mary Anne Moss RN  Safety Promotion/Fall Prevention:   activity supervised   clutter free environment maintained   increased rounding and observation   increase visualization of patient   safety round/check completed  Intervention: Prevent Infection  Recent Flowsheet Documentation  Taken 1/12/2025 0943 by Mary Anne Moss RN  Infection Prevention: rest/sleep promoted  Goal: Optimal Comfort and Wellbeing  Outcome: Progressing  Goal: Readiness for Transition of Care  Outcome: Progressing     Problem: Skin Injury Risk Increased  Goal: Skin Health and Integrity  Outcome: Progressing  Intervention: Plan: Nurse " Driven Intervention: Moisture Management  Recent Flowsheet Documentation  Taken 1/12/2025 1400 by Mary Anne Moss, RN  Bathing/Skin Care: incontinence care  Intervention: Optimize Skin Protection  Recent Flowsheet Documentation  Taken 1/12/2025 1400 by Mary Anne Moss, RN  Activity Management: ambulated to bathroom  Taken 1/12/2025 0943 by Mary Anne Moss, RN  Activity Management: ambulated to bathroom     Problem: Gas Exchange Impaired  Goal: Optimal Gas Exchange  Outcome: Progressing   Goal Outcome Evaluation:      Plan of Care Reviewed With: patient    Overall Patient Progress: improvingOverall Patient Progress: improving    Outcome Evaluation: Treatment Plan: Cont IV meropenem.

## 2025-01-12 NOTE — PROGRESS NOTES
[facility]    Medicine Progress Note - Hospitalist Service    Date of Admission:  1/6/2025    Assessment & Plan   74-year-old female with a significant past medical history, including primary biliary cirrhosis post-liver transplant (2002) on chronic immunosuppression, atrial fibrillation on anticoagulation, prior cerebrovascular accident (CVA), chronic kidney disease (CKD) stage III, and type 2 diabetes mellitus HLD, HTN and PAD. She was admitted with fever and generalized weakness. Recent admission with same issues, viral and culture workup negative. Appears to have new lung infiltrate that could represent pneumonia. Receiving empiric antibiotics, awaiting final culture results.    Fever-resolved  Right lowe lobe infiltrate aspiration vs HAP ( this was noted on CT scan from 12/10/24)  Patient presented for the 2nd time in less than a week with fever and prior admission work up was largely negative with continued negative blood cultures earlier.  CXR shows new airspace disease in RLL but there was mention of small ground glass opacities on the right lower lobe. Patient also endorsed bladder spasms morning of 1/9, which she states only happens when she has a UTI. Will culture urine from admission, although UA looked clean. Temp again randi to 100F evening of 1/11. Will consult ID for recommendations given patient's immune status and recurrent hospitalization with similar presentation.   Blood cultures NGTD  Culturing urine as well, no growth to date  Continue meropenem for now given Hx of resistant organism infection  Consulted ID given recurrent hospitalization and immune status  Patient declined SLP evaluation to rule out aspiration     Bilateral LE erythema  Venous stasis changes vs cellulitis  Appearance can be due to stasis dermatitis given its symmetry but in setting of ongoing fever, will monitor and treat for soft tissue infection as well.   Continue Meropenem as above      History of  "PBC  S/P liver transplant  Continue Urosdiol  Continue Prednisone  Continue Sirolimus, check levels     Hx of DVT  Continue apixaban     PAF  Continue Toprol XL 25 mg daily  Continue apixaban     Hypothyroidism  Continue Synthroid replacement     DM type II  Continue linagliptin  Mod CHO diet  Accucheck BID          Diet: Moderate Consistent Carb (60 g CHO per Meal) Diet    DVT Prophylaxis: DOAC  Ron Catheter: Not present  Lines: None     Cardiac Monitoring: None  Code Status: Full Code      Clinically Significant Risk Factors          # Hyperchloremia: Highest Cl = 109 mmol/L in last 2 days, will monitor as appropriate              # Hypertension: Noted on problem list           # DMII: A1C = 6.9 % (Ref range: <5.7 %) within past 6 months   # Overweight: Estimated body mass index is 29.87 kg/m  as calculated from the following:    Height as of this encounter: 1.702 m (5' 7\").    Weight as of this encounter: 86.5 kg (190 lb 11.2 oz).             Social Drivers of Health    Housing Stability: Low Risk  (1/7/2025)    Housing Stability     Do you have housing? : Yes     Are you worried about losing your housing?: No   Recent Concern: Housing Stability - High Risk (10/12/2024)    Housing Stability     Do you have housing? : No     Are you worried about losing your housing?: No   Tobacco Use: Medium Risk (1/2/2025)    Patient History     Smoking Tobacco Use: Former     Smokeless Tobacco Use: Never   Physical Activity: Insufficiently Active (11/9/2023)    Received from [facility]    Exercise Vital Sign     Days of Exercise per Week: 5 days     Minutes of Exercise per Session: 10 min          Disposition Plan     Medically Ready for Discharge: Anticipated Tomorrow             Jose Renee MD  Hospitalist Service  [facility]  Securely message with [employer] (more info)  Text page via [facility] Paging/Directory   ______________________________________________________________________    Interval History "   NAEO.  Temp did rise again to 100F. Cultures still negative. Runny nose and cough but otherwise feeling okay.     Physical Exam   Vital Signs: Temp: 98.4  F (36.9  C) Temp src: Oral BP: (!) 155/74 Pulse: 65   Resp: 16 SpO2: 95 % O2 Device: None (Room air) Oxygen Delivery: 2 LPM  Weight: 190 lbs 11.17 oz    Constitutional: Sitting in bed, no acute distress  Respiratory: Lungs clear, normal WOB, room air  Cardiovascular: RRR, no MRGs  GI: Soft, non-tender, non-distended  Neurologic: AAOx3, mentating clearly    Medical Decision Making       45 MINUTES SPENT BY ME on the date of service doing chart review, history, exam, documentation & further activities per the note.      Data     I have personally reviewed the following data over the past 24 hrs:    6.1  \   8.2 (L)   / 361     140 109 (H) 36.9 (H) /  117 (H)   4.1 20 (L) 1.41 (H) \       Imaging results reviewed over the past 24 hrs:   No results found for this or any previous visit (from the past 24 hours).

## 2025-01-12 NOTE — PLAN OF CARE
"For vital signs and complete assessments, please see documentation flowsheets.     [id number]  Pertinent assessments: Pt A&Ox4. SBA with cane in room. On 2L NC overnight.  Max temp 99.1 F. Elevated BP, other VSS. Denies pain & nausea. PIV saline locked. External cath in place overnight per Pt request, with adequate output.    Major Shift Events: none    Treatment Plan: Contact precautions. O2 support prn. IV Merrem. Monitor BG. Prednisone. Discharge TBD.    Bedside Nurse: Charanjit Tristan, RN     Problem: Adult Inpatient Plan of Care  Goal: Plan of Care Review  Description: The Plan of Care Review/Shift note should be completed every shift.  The Outcome Evaluation is a brief statement about your assessment that the patient is improving, declining, or no change.  This information will be displayed automatically on your shift  note.  Outcome: Progressing  Flowsheets (Taken 1/12/2025 8923)  Outcome Evaluation: IV abx continued. On 2L overnight. Max temp 99.1  Plan of Care Reviewed With: patient  Overall Patient Progress: improving  Goal: Patient-Specific Goal (Individualized)  Description: You can add care plan individualizations to a care plan. Examples of Individualization might be:  \"Parent requests to be called daily at 9am for status\", \"I have a hard time hearing out of my right ear\", or \"Do not touch me to wake me up as it startles  me\".  Outcome: Progressing  Goal: Absence of Hospital-Acquired Illness or Injury  Outcome: Progressing  Intervention: Identify and Manage Fall Risk  Recent Flowsheet Documentation  Taken 1/12/2025 0227 by Charanjit Tristan, RN  Safety Promotion/Fall Prevention:   activity supervised   assistive device/personal items within reach   clutter free environment maintained   increase visualization of patient   nonskid shoes/slippers when out of bed   room near nurse's station   room organization consistent   treat reversible contributory factors   safety round/check completed  Intervention: " Prevent Skin Injury  Recent Flowsheet Documentation  Taken 1/12/2025 0227 by Charanjit Tristan RN  Body Position: position changed independently  Intervention: Prevent and Manage VTE (Venous Thromboembolism) Risk  Recent Flowsheet Documentation  Taken 1/12/2025 0227 by Charanjit Tristan RN  VTE Prevention/Management: SCDs off (sequential compression devices)  Intervention: Prevent Infection  Recent Flowsheet Documentation  Taken 1/12/2025 0227 by Charanjit Tristan RN  Infection Prevention:   cohorting utilized   hand hygiene promoted   personal protective equipment utilized   rest/sleep promoted  Goal: Optimal Comfort and Wellbeing  Outcome: Progressing  Goal: Readiness for Transition of Care  Outcome: Progressing     Problem: Skin Injury Risk Increased  Goal: Skin Health and Integrity  Outcome: Progressing  Intervention: Optimize Skin Protection  Recent Flowsheet Documentation  Taken 1/12/2025 0227 by Charanjit Tristan RN  Head of Bed (HOB) Positioning: HOB at 20-30 degrees     Problem: Gas Exchange Impaired  Goal: Optimal Gas Exchange  Outcome: Progressing  Intervention: Optimize Oxygenation and Ventilation  Recent Flowsheet Documentation  Taken 1/12/2025 0227 by Charanjit Tristan RN  Head of Bed (HOB) Positioning: HOB at 20-30 degrees     Goal Outcome Evaluation:      Plan of Care Reviewed With: patient    Overall Patient Progress: improvingOverall Patient Progress: improving    Outcome Evaluation: IV abx continued. On 2L overnight. Max temp 99.1

## 2025-01-12 NOTE — PLAN OF CARE
"Pertinent assessments: A&Ox4. On room air while awake. On 2L NC at sleep. Tolerating a mod CHO diet. BID BG assessments. Up with SBA and a cane. Saline locked PIV. T max: 100.0 F oral, PRN Tylenol given x1.      Major Shift Events: none.     Treatment Plan: IV Merrem, PO prednisone, Eliquis BID, BG monitoring, isolation precautions, and discharge pending.     Bedside Nurse: Shashank Ortiz RN    Problem: Adult Inpatient Plan of Care  Goal: Plan of Care Review  Description: The Plan of Care Review/Shift note should be completed every shift.  The Outcome Evaluation is a brief statement about your assessment that the patient is improving, declining, or no change.  This information will be displayed automatically on your shift  note.  Outcome: Progressing  Flowsheets (Taken 1/11/2025 2237)  Outcome Evaluation: Continue IV Merrem.  Plan of Care Reviewed With: patient  Overall Patient Progress: improving  Goal: Patient-Specific Goal (Individualized)  Description: You can add care plan individualizations to a care plan. Examples of Individualization might be:  \"Parent requests to be called daily at 9am for status\", \"I have a hard time hearing out of my right ear\", or \"Do not touch me to wake me up as it startles  me\".  Outcome: Progressing  Goal: Absence of Hospital-Acquired Illness or Injury  Outcome: Progressing  Intervention: Identify and Manage Fall Risk  Recent Flowsheet Documentation  Taken 1/11/2025 1555 by Shashank Ortiz RN  Safety Promotion/Fall Prevention:   activity supervised   clutter free environment maintained   increase visualization of patient   lighting adjusted   nonskid shoes/slippers when out of bed   patient and family education   room near nurse's station   safety round/check completed   supervised activity   treat reversible contributory factors  Intervention: Prevent Skin Injury  Recent Flowsheet Documentation  Taken 1/11/2025 1555 by Shashank Ortiz RN  Body Position: position changed " independently  Intervention: Prevent and Manage VTE (Venous Thromboembolism) Risk  Recent Flowsheet Documentation  Taken 1/11/2025 1555 by Shashank Ortiz, RN  VTE Prevention/Management: SCDs off (sequential compression devices)  Intervention: Prevent Infection  Recent Flowsheet Documentation  Taken 1/11/2025 1555 by Shashank Ortiz, RN  Infection Prevention:   cohorting utilized   hand hygiene promoted   rest/sleep promoted   single patient room provided  Goal: Optimal Comfort and Wellbeing  Outcome: Progressing  Goal: Readiness for Transition of Care  Outcome: Progressing     Goal Outcome Evaluation:      Plan of Care Reviewed With: patient    Overall Patient Progress: improving    Overall Patient Progress: improving    Outcome Evaluation: Continue IV Merrem.

## 2025-01-12 NOTE — PROGRESS NOTES
Care Management Follow Up    Length of Stay (days): 5    Expected Discharge Date: 01/12/2025     Concerns to be Addressed: discharge planning     Patient plan of care discussed at interdisciplinary rounds: Yes    Anticipated Discharge Disposition: Home, HH PT/OT   Anticipated Discharge Services:    Anticipated Discharge DME:      Patient/family educated on Medicare website which has current facility and service quality ratings:  yes  Education Provided on the Discharge Plan:  yes  Patient/Family in Agreement with the Plan: yes    Referrals Placed by CM/SW:  Home Care    Discussed  Partnership in Safe Discharge Planning  document with patient/family: No     Handoff Completed: No, handoff not indicated or clinically appropriate    Additional Information:  Patient anticipated to discharge home w HH PT/OT services provided by in-house rehab [facility] at her independent living facility.   She would need home care PT/OT orders at discharge, and they will need to be faxed to [phone].    Next Steps: Send home care orders at discharge    Millicent Iqbal RN, BSN  Inpatient Care Coordination  [facility]  [phone]

## 2025-01-13 ENCOUNTER — APPOINTMENT (OUTPATIENT)
Dept: PHYSICAL THERAPY | Facility: CLINIC | Age: 75
DRG: 194 | End: 2025-01-13
Payer: MEDICARE

## 2025-01-13 LAB
ANION GAP SERPL CALCULATED.3IONS-SCNC: 11 MMOL/L (ref 7–15)
BUN SERPL-MCNC: 35.8 MG/DL (ref 8–23)
CALCIUM SERPL-MCNC: 9.1 MG/DL (ref 8.8–10.4)
CHLORIDE SERPL-SCNC: 107 MMOL/L (ref 98–107)
CREAT SERPL-MCNC: 1.44 MG/DL (ref 0.51–0.95)
CRP SERPL-MCNC: 3.26 MG/L
EGFRCR SERPLBLD CKD-EPI 2021: 38 ML/MIN/1.73M2
ERYTHROCYTE [DISTWIDTH] IN BLOOD BY AUTOMATED COUNT: 15.6 % (ref 10–15)
GLUCOSE BLDC GLUCOMTR-MCNC: 163 MG/DL (ref 70–99)
GLUCOSE BLDC GLUCOMTR-MCNC: 177 MG/DL (ref 70–99)
GLUCOSE SERPL-MCNC: 97 MG/DL (ref 70–99)
HCO3 SERPL-SCNC: 22 MMOL/L (ref 22–29)
HCT VFR BLD AUTO: 29.1 % (ref 35–47)
HGB BLD-MCNC: 8.9 G/DL (ref 11.7–15.7)
MCH RBC QN AUTO: 27.7 PG (ref 26.5–33)
MCHC RBC AUTO-ENTMCNC: 30.6 G/DL (ref 31.5–36.5)
MCV RBC AUTO: 91 FL (ref 78–100)
PLATELET # BLD AUTO: 376 10E3/UL (ref 150–450)
POTASSIUM SERPL-SCNC: 4.5 MMOL/L (ref 3.4–5.3)
RBC # BLD AUTO: 3.21 10E6/UL (ref 3.8–5.2)
SODIUM SERPL-SCNC: 140 MMOL/L (ref 135–145)
WBC # BLD AUTO: 6.6 10E3/UL (ref 4–11)

## 2025-01-13 PROCEDURE — 36415 COLL VENOUS BLD VENIPUNCTURE: CPT | Performed by: STUDENT IN AN ORGANIZED HEALTH CARE EDUCATION/TRAINING PROGRAM

## 2025-01-13 PROCEDURE — 36415 COLL VENOUS BLD VENIPUNCTURE: CPT | Performed by: INTERNAL MEDICINE

## 2025-01-13 PROCEDURE — 97530 THERAPEUTIC ACTIVITIES: CPT | Mod: GP | Performed by: PHYSICAL THERAPIST

## 2025-01-13 PROCEDURE — 250N000013 HC RX MED GY IP 250 OP 250 PS 637: Performed by: STUDENT IN AN ORGANIZED HEALTH CARE EDUCATION/TRAINING PROGRAM

## 2025-01-13 PROCEDURE — 84155 ASSAY OF PROTEIN SERUM: CPT | Performed by: STUDENT IN AN ORGANIZED HEALTH CARE EDUCATION/TRAINING PROGRAM

## 2025-01-13 PROCEDURE — 82040 ASSAY OF SERUM ALBUMIN: CPT | Performed by: STUDENT IN AN ORGANIZED HEALTH CARE EDUCATION/TRAINING PROGRAM

## 2025-01-13 PROCEDURE — 87385 HISTOPLASMA CAPSUL AG IA: CPT | Performed by: INTERNAL MEDICINE

## 2025-01-13 PROCEDURE — 250N000013 HC RX MED GY IP 250 OP 250 PS 637: Performed by: INTERNAL MEDICINE

## 2025-01-13 PROCEDURE — 250N000011 HC RX IP 250 OP 636: Performed by: INTERNAL MEDICINE

## 2025-01-13 PROCEDURE — 85018 HEMOGLOBIN: CPT | Performed by: STUDENT IN AN ORGANIZED HEALTH CARE EDUCATION/TRAINING PROGRAM

## 2025-01-13 PROCEDURE — 250N000012 HC RX MED GY IP 250 OP 636 PS 637: Performed by: INTERNAL MEDICINE

## 2025-01-13 PROCEDURE — 87449 NOS EACH ORGANISM AG IA: CPT | Performed by: INTERNAL MEDICINE

## 2025-01-13 PROCEDURE — 99232 SBSQ HOSP IP/OBS MODERATE 35: CPT | Performed by: STUDENT IN AN ORGANIZED HEALTH CARE EDUCATION/TRAINING PROGRAM

## 2025-01-13 PROCEDURE — 86140 C-REACTIVE PROTEIN: CPT | Performed by: INTERNAL MEDICINE

## 2025-01-13 PROCEDURE — 80048 BASIC METABOLIC PNL TOTAL CA: CPT | Performed by: STUDENT IN AN ORGANIZED HEALTH CARE EDUCATION/TRAINING PROGRAM

## 2025-01-13 PROCEDURE — 86635 COCCIDIOIDES ANTIBODY: CPT | Performed by: INTERNAL MEDICINE

## 2025-01-13 PROCEDURE — 80053 COMPREHEN METABOLIC PANEL: CPT | Performed by: STUDENT IN AN ORGANIZED HEALTH CARE EDUCATION/TRAINING PROGRAM

## 2025-01-13 PROCEDURE — 120N000001 HC R&B MED SURG/OB

## 2025-01-13 PROCEDURE — 86612 BLASTOMYCES ANTIBODY: CPT | Performed by: INTERNAL MEDICINE

## 2025-01-13 PROCEDURE — 80051 ELECTROLYTE PANEL: CPT | Performed by: STUDENT IN AN ORGANIZED HEALTH CARE EDUCATION/TRAINING PROGRAM

## 2025-01-13 PROCEDURE — 85041 AUTOMATED RBC COUNT: CPT | Performed by: STUDENT IN AN ORGANIZED HEALTH CARE EDUCATION/TRAINING PROGRAM

## 2025-01-13 RX ADMIN — METOPROLOL SUCCINATE 25 MG: 25 TABLET, EXTENDED RELEASE ORAL at 09:17

## 2025-01-13 RX ADMIN — LEVOTHYROXINE SODIUM 175 MCG: 150 TABLET ORAL at 09:17

## 2025-01-13 RX ADMIN — CALCITRIOL CAPSULES 0.25 MCG 0.25 MCG: 0.25 CAPSULE ORAL at 09:17

## 2025-01-13 RX ADMIN — EZETIMIBE 10 MG: 10 TABLET ORAL at 21:04

## 2025-01-13 RX ADMIN — MEROPENEM 1 G: 1 INJECTION, POWDER, FOR SOLUTION INTRAVENOUS at 02:01

## 2025-01-13 RX ADMIN — SITAGLIPTIN 50 MG: 50 TABLET, FILM COATED ORAL at 09:17

## 2025-01-13 RX ADMIN — URSODIOL 250 MG: 250 TABLET ORAL at 19:27

## 2025-01-13 RX ADMIN — OMEGA-3-ACID ETHYL ESTERS 1 G: 1 CAPSULE, LIQUID FILLED ORAL at 09:17

## 2025-01-13 RX ADMIN — AMLODIPINE BESYLATE 5 MG: 5 TABLET ORAL at 09:17

## 2025-01-13 RX ADMIN — FOLIC ACID 1 MG: 1 TABLET ORAL at 09:17

## 2025-01-13 RX ADMIN — PREDNISONE 10 MG: 10 TABLET ORAL at 09:17

## 2025-01-13 RX ADMIN — APIXABAN 2.5 MG: 2.5 TABLET, FILM COATED ORAL at 09:17

## 2025-01-13 RX ADMIN — OMEGA-3-ACID ETHYL ESTERS 1 G: 1 CAPSULE, LIQUID FILLED ORAL at 21:04

## 2025-01-13 RX ADMIN — URSODIOL 250 MG: 250 TABLET ORAL at 09:17

## 2025-01-13 RX ADMIN — Medication 1 EACH: at 09:19

## 2025-01-13 RX ADMIN — GABAPENTIN 300 MG: 250 SUSPENSION ORAL at 21:05

## 2025-01-13 RX ADMIN — APIXABAN 2.5 MG: 2.5 TABLET, FILM COATED ORAL at 19:27

## 2025-01-13 RX ADMIN — SIROLIMUS 1 MG: 0.5 TABLET ORAL at 09:16

## 2025-01-13 ASSESSMENT — ACTIVITIES OF DAILY LIVING (ADL)
ADLS_ACUITY_SCORE: 55
ADLS_ACUITY_SCORE: 58
ADLS_ACUITY_SCORE: 55
ADLS_ACUITY_SCORE: 55
ADLS_ACUITY_SCORE: 58
ADLS_ACUITY_SCORE: 58
ADLS_ACUITY_SCORE: 55
ADLS_ACUITY_SCORE: 58
ADLS_ACUITY_SCORE: 55
ADLS_ACUITY_SCORE: 58
ADLS_ACUITY_SCORE: 55
ADLS_ACUITY_SCORE: 55
ADLS_ACUITY_SCORE: 58
ADLS_ACUITY_SCORE: 55

## 2025-01-13 NOTE — PROGRESS NOTES
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 She was admitted with fever and generalized weakness. Recent admission with same issues, viral and culture workup negative. Appears to have new lung infiltrate that could represent pneumonia. Receiving empiric antibiotics, awaiting final culture results.    Fever-resolved  Right lowe lobe infiltrate aspiration vs HAP ( this was noted on CT scan from 12/10/24)  Patient presented for the 2nd time in less than a week with fever and prior admission work up was largely negative with continued negative blood cultures earlier.  CXR shows new airspace disease in RLL but there was mention of small ground glass opacities on the right lower lobe. Patient also endorsed bladder spasms morning of 1/9, which she states only happens when she has a UTI. Will culture urine from admission, although UA looked clean. Temp again randi to 100F evening of 1/11. Will consult ID for recommendations given patient's immune status and recurrent hospitalization with similar presentation.   Blood cultures and urine culture NGTD  Infectious disease consulted, appreciate assistance  Discontinue meropenem per ID recommendations, watch fever curve off antibiotics.  Completed 7 days of treatment.  Expanded infectious workup including fungal serologies ordered per ID recommendation  Patient declined SLP evaluation to rule out aspiration     Bilateral LE erythema  Venous stasis changes vs cellulitis  Appearance can be due to stasis dermatitis given its symmetry but in setting of ongoing fever, will monitor  "and treat for soft tissue infection as well.   Completed 7 days of meropenem as above     History of PBC  S/P liver transplant  Continue Urosdiol  Continue Prednisone  Continue Sirolimus, check levels     Hx of DVT  Continue apixaban     PAF  Continue Toprol XL 25 mg daily  Continue apixaban     Hypothyroidism  Continue Synthroid replacement     DM type II  Continue linagliptin  Mod CHO diet  Accucheck BID          Diet: Moderate Consistent Carb (60 g CHO per Meal) Diet    DVT Prophylaxis: DOAC  Ron Catheter: Not present  Lines: None     Cardiac Monitoring: None  Code Status: Full Code      Clinically Significant Risk Factors          # Hyperchloremia: Highest Cl = 109 mmol/L in last 2 days, will monitor as appropriate              # Hypertension: Noted on problem list           # DMII: A1C = 6.9 % (Ref range: <5.7 %) within past 6 months   # Overweight: Estimated body mass index is 29.87 kg/m  as calculated from the following:    Height as of this encounter: 1.702 m (5' 7\").    Weight as of this encounter: 86.5 kg (190 lb 11.2 oz).             Social Drivers of Health    Housing Stability: Low Risk  (1/7/2025)    Housing Stability     Do you have housing? : Yes     Are you worried about losing your housing?: No   Recent Concern: Housing Stability - High Risk (10/12/2024)    Housing Stability     Do you have housing? : No     Are you worried about losing your housing?: No   Tobacco Use: Medium Risk (1/2/2025)    Patient History     Smoking Tobacco Use: Former     Smokeless Tobacco Use: Never   Physical Activity: Insufficiently Active (11/9/2023)    Received from [facility]    Exercise Vital Sign     Days of Exercise per Week: 5 days     Minutes of Exercise per Session: 10 min          Disposition Plan     Medically Ready for Discharge: Anticipated Tomorrow             Jose Renee MD  Hospitalist Service  [facility]  Securely message with [employer] (more info)  Text page via [employer] " Paging/Directory   ______________________________________________________________________    Interval History   NAEO.  Continues to have low-grade temperature elevations but no true fever.    Physical Exam   Vital Signs: Temp: 98.7  F (37.1  C) Temp src: Oral BP: (!) 142/63 Pulse: 66   Resp: 16 SpO2: 97 % O2 Device: None (Room air) Oxygen Delivery: 2 LPM  Weight: 190 lbs 11.17 oz    Constitutional: Sitting in bed, no acute distress  Respiratory: Lungs clear, normal WOB, room air  Cardiovascular: RRR, no MRGs  GI: Soft, non-tender, non-distended  Neurologic: AAOx3, mentating clearly    Medical Decision Making       45 MINUTES SPENT BY ME on the date of service doing chart review, history, exam, documentation & further activities per the note.      Data     I have personally reviewed the following data over the past 24 hrs:    6.6  \   8.9 (L)   / 376     140 107 35.8 (H) /  163 (H)   4.5 22 1.44 (H) \       Imaging results reviewed over the past 24 hrs:   No results found for this or any previous visit (from the past 24 hours).

## 2025-01-13 NOTE — PLAN OF CARE
"Pertinent assessments: A&Ox4. On room air while awake. On 2L NC at sleep. Tolerating a mod CHO diet. BID BG assessments. Up with SBA and a cane. Saline locked PIV.       Major Shift Events: none.     Treatment Plan: IV Merrem, PO prednisone, Eliquis BID, BG monitoring, isolation precautions, and discharge pending.     Bedside Nurse: Shashank Ortiz RN    Problem: Adult Inpatient Plan of Care  Goal: Plan of Care Review  Description: The Plan of Care Review/Shift note should be completed every shift.  The Outcome Evaluation is a brief statement about your assessment that the patient is improving, declining, or no change.  This information will be displayed automatically on your shift  note.  Outcome: Progressing  Flowsheets (Taken 1/12/2025 2218)  Outcome Evaluation: Continue IV Merrem.  Plan of Care Reviewed With: patient  Overall Patient Progress: improving  Goal: Patient-Specific Goal (Individualized)  Description: You can add care plan individualizations to a care plan. Examples of Individualization might be:  \"Parent requests to be called daily at 9am for status\", \"I have a hard time hearing out of my right ear\", or \"Do not touch me to wake me up as it startles  me\".  Outcome: Progressing  Goal: Absence of Hospital-Acquired Illness or Injury  Outcome: Progressing  Intervention: Identify and Manage Fall Risk  Recent Flowsheet Documentation  Taken 1/12/2025 1541 by Shashank Ortiz RN  Safety Promotion/Fall Prevention:   activity supervised   clutter free environment maintained   increased rounding and observation   increase visualization of patient   lighting adjusted   nonskid shoes/slippers when out of bed   patient and family education   room near nurse's station   safety round/check completed   treat reversible contributory factors  Intervention: Prevent Skin Injury  Recent Flowsheet Documentation  Taken 1/12/2025 1541 by Shashank Ortiz RN  Body Position: position changed independently  Intervention: " Prevent and Manage VTE (Venous Thromboembolism) Risk  Recent Flowsheet Documentation  Taken 1/12/2025 1541 by Shashank Ortiz, RN  VTE Prevention/Management: SCDs off (sequential compression devices)  Intervention: Prevent Infection  Recent Flowsheet Documentation  Taken 1/12/2025 1541 by Shashank Ortiz, RN  Infection Prevention:   cohorting utilized   equipment surfaces disinfected   hand hygiene promoted   personal protective equipment utilized   rest/sleep promoted   single patient room provided  Goal: Optimal Comfort and Wellbeing  Outcome: Progressing  Goal: Readiness for Transition of Care  Outcome: Progressing     Goal Outcome Evaluation:      Plan of Care Reviewed With: patient    Overall Patient Progress: improving    Overall Patient Progress: improving    Outcome Evaluation: Continue IV Merrem.       Yes

## 2025-01-13 NOTE — PROGRESS NOTES
Patient seen and note dictated, 2+ week illness with malaise, chills, low-grade temperature elevations, infection workup negative, possible pneumonia treated with 1 week of empiric meropenem, at risk for obvious infections and previous coccidiomycosis, get fungal serologies and blasto and histo antigens, check CRP, agree stop meropenem and watch off, has regular transplant ID at the  that she sees probably follow-up there in [place] discharge sometime sooner now

## 2025-01-13 NOTE — PLAN OF CARE
"Pertinent assessments: A&Ox4. VSS, on 2L NC overnight. Tmax 99.4. Denies pain and SOB. Tolerating diet. Up Ax1 with cane      Major Shift Events: none.     Treatment Plan: IV Merrem, PO prednisone, Eliquis BID, BG monitoring, isolation precautions, and discharge pending.     Bedside Nurse: Verona Shah RN     Goal Outcome Evaluation:      Plan of Care Reviewed With: patient    Overall Patient Progress: improvingOverall Patient Progress: improving    Outcome Evaluation: No fevers this shift.      Problem: Adult Inpatient Plan of Care  Goal: Plan of Care Review  Description: The Plan of Care Review/Shift note should be completed every shift.  The Outcome Evaluation is a brief statement about your assessment that the patient is improving, declining, or no change.  This information will be displayed automatically on your shift  note.  Outcome: Progressing  Flowsheets (Taken 1/13/2025 0613)  Outcome Evaluation: No fevers this shift.  Plan of Care Reviewed With: patient  Overall Patient Progress: improving  Goal: Patient-Specific Goal (Individualized)  Description: You can add care plan individualizations to a care plan. Examples of Individualization might be:  \"Parent requests to be called daily at 9am for status\", \"I have a hard time hearing out of my right ear\", or \"Do not touch me to wake me up as it startles  me\".  Outcome: Progressing  Goal: Absence of Hospital-Acquired Illness or Injury  Outcome: Progressing  Intervention: Identify and Manage Fall Risk  Recent Flowsheet Documentation  Taken 1/13/2025 0000 by Verona Shah RN  Safety Promotion/Fall Prevention:   activity supervised   assistive device/personal items within reach   clutter free environment maintained   increased rounding and observation   safety round/check completed   supervised activity  Intervention: Prevent Skin Injury  Recent Flowsheet Documentation  Taken 1/13/2025 0000 by Verona Shah RN  Body Position: position changed " independently  Intervention: Prevent and Manage VTE (Venous Thromboembolism) Risk  Recent Flowsheet Documentation  Taken 1/13/2025 0000 by Verona Shah, RN  VTE Prevention/Management: SCDs off (sequential compression devices)  Intervention: Prevent Infection  Recent Flowsheet Documentation  Taken 1/13/2025 0000 by Verona Shah, RN  Infection Prevention: hand hygiene promoted  Goal: Optimal Comfort and Wellbeing  Outcome: Progressing  Goal: Readiness for Transition of Care  Outcome: Progressing     Problem: Skin Injury Risk Increased  Goal: Skin Health and Integrity  Outcome: Progressing  Intervention: Optimize Skin Protection  Recent Flowsheet Documentation  Taken 1/13/2025 0000 by Verona Shah, RN  Activity Management:   activity adjusted per tolerance   ambulated to bathroom   back to bed  Head of Bed (HOB) Positioning: HOB lowered     Problem: Gas Exchange Impaired  Goal: Optimal Gas Exchange  Outcome: Progressing  Intervention: Optimize Oxygenation and Ventilation  Recent Flowsheet Documentation  Taken 1/13/2025 0000 by Verona Shah RN  Head of Bed (HOB) Positioning: HOB lowered

## 2025-01-14 VITALS
DIASTOLIC BLOOD PRESSURE: 64 MMHG | WEIGHT: 190.7 LBS | RESPIRATION RATE: 18 BRPM | SYSTOLIC BLOOD PRESSURE: 137 MMHG | HEIGHT: 67 IN | OXYGEN SATURATION: 96 % | BODY MASS INDEX: 29.93 KG/M2 | TEMPERATURE: 98.5 F | HEART RATE: 68 BPM

## 2025-01-14 LAB
ALBUMIN SERPL BCG-MCNC: 3.6 G/DL (ref 3.5–5.2)
ALP SERPL-CCNC: 103 U/L (ref 40–150)
ALT SERPL W P-5'-P-CCNC: 24 U/L (ref 0–50)
AST SERPL W P-5'-P-CCNC: 19 U/L (ref 0–45)
BILIRUB DIRECT SERPL-MCNC: <0.2 MG/DL (ref 0–0.3)
BILIRUB SERPL-MCNC: 0.2 MG/DL
GLUCOSE BLDC GLUCOMTR-MCNC: 103 MG/DL (ref 70–99)
PROT SERPL-MCNC: 6.3 G/DL (ref 6.4–8.3)

## 2025-01-14 PROCEDURE — 250N000012 HC RX MED GY IP 250 OP 636 PS 637: Performed by: INTERNAL MEDICINE

## 2025-01-14 PROCEDURE — 250N000013 HC RX MED GY IP 250 OP 250 PS 637: Performed by: STUDENT IN AN ORGANIZED HEALTH CARE EDUCATION/TRAINING PROGRAM

## 2025-01-14 PROCEDURE — 250N000013 HC RX MED GY IP 250 OP 250 PS 637: Performed by: INTERNAL MEDICINE

## 2025-01-14 PROCEDURE — 999N000111 HC STATISTIC OT IP EVAL DEFER

## 2025-01-14 RX ADMIN — URSODIOL 250 MG: 250 TABLET ORAL at 09:29

## 2025-01-14 RX ADMIN — AMLODIPINE BESYLATE 5 MG: 5 TABLET ORAL at 09:29

## 2025-01-14 RX ADMIN — APIXABAN 2.5 MG: 2.5 TABLET, FILM COATED ORAL at 09:29

## 2025-01-14 RX ADMIN — METOPROLOL SUCCINATE 25 MG: 25 TABLET, EXTENDED RELEASE ORAL at 09:29

## 2025-01-14 RX ADMIN — SITAGLIPTIN 50 MG: 50 TABLET, FILM COATED ORAL at 09:30

## 2025-01-14 RX ADMIN — SIROLIMUS 1 MG: 0.5 TABLET ORAL at 09:29

## 2025-01-14 RX ADMIN — PREDNISONE 10 MG: 10 TABLET ORAL at 09:29

## 2025-01-14 RX ADMIN — Medication 1 EACH: at 09:31

## 2025-01-14 RX ADMIN — OMEGA-3-ACID ETHYL ESTERS 1 G: 1 CAPSULE, LIQUID FILLED ORAL at 09:29

## 2025-01-14 RX ADMIN — CALCITRIOL CAPSULES 0.25 MCG 0.25 MCG: 0.25 CAPSULE ORAL at 09:29

## 2025-01-14 RX ADMIN — FOLIC ACID 1 MG: 1 TABLET ORAL at 09:29

## 2025-01-14 RX ADMIN — LEVOTHYROXINE SODIUM 175 MCG: 150 TABLET ORAL at 09:29

## 2025-01-14 ASSESSMENT — ACTIVITIES OF DAILY LIVING (ADL)
ADLS_ACUITY_SCORE: 55

## 2025-01-14 NOTE — PLAN OF CARE
"To Do:  End of Shift Summary  For vital signs and complete assessments, please see documentation flowsheets.     Pertinent assessments: Pt is A/Ox4. VSS on RA, on 2L overnight. Denies pain, SOB, and N/V. SBA with cane. PIV SL. Mod carb diet.     Major Shift Events: uneventful.    Treatment Plan: Maintain contact precautions. ID, PT, and OT following.    Bedside Nurse: Martine Archer RN     Goal Outcome Evaluation:      Plan of Care Reviewed With: patient    Overall Patient Progress: improvingOverall Patient Progress: improving    Outcome Evaluation: No fevers this shift.      Problem: Adult Inpatient Plan of Care  Goal: Plan of Care Review  Description: The Plan of Care Review/Shift note should be completed every shift.  The Outcome Evaluation is a brief statement about your assessment that the patient is improving, declining, or no change.  This information will be displayed automatically on your shift  note.  Outcome: Progressing  Flowsheets (Taken 1/13/2025 4958)  Outcome Evaluation: No fevers this shift.  Plan of Care Reviewed With: patient  Overall Patient Progress: improving  Goal: Patient-Specific Goal (Individualized)  Description: You can add care plan individualizations to a care plan. Examples of Individualization might be:  \"Parent requests to be called daily at 9am for status\", \"I have a hard time hearing out of my right ear\", or \"Do not touch me to wake me up as it startles  me\".  Outcome: Progressing  Goal: Absence of Hospital-Acquired Illness or Injury  Outcome: Progressing  Intervention: Identify and Manage Fall Risk  Recent Flowsheet Documentation  Taken 1/13/2025 1550 by Martine Archer, NENO  Safety Promotion/Fall Prevention:   activity supervised   clutter free environment maintained   increased rounding and observation   increase visualization of patient   nonskid shoes/slippers when out of bed   mobility aid in reach   room near nurse's station   safety round/check completed  Intervention: Prevent " Skin Injury  Recent Flowsheet Documentation  Taken 1/13/2025 1758 by Martine Archer RN  Body Position: position changed independently  Taken 1/13/2025 1550 by Martine Archer RN  Body Position:   position changed independently   sitting up in bed  Intervention: Prevent and Manage VTE (Venous Thromboembolism) Risk  Recent Flowsheet Documentation  Taken 1/13/2025 1550 by Martine Archer RN  VTE Prevention/Management: SCDs off (sequential compression devices)  Intervention: Prevent Infection  Recent Flowsheet Documentation  Taken 1/13/2025 1550 by Martine Archer RN  Infection Prevention:   cohorting utilized   rest/sleep promoted   single patient room provided  Goal: Optimal Comfort and Wellbeing  Outcome: Progressing  Goal: Readiness for Transition of Care  Outcome: Progressing     Problem: Skin Injury Risk Increased  Goal: Skin Health and Integrity  Outcome: Progressing  Intervention: Plan: Nurse Driven Intervention: Moisture Management  Recent Flowsheet Documentation  Taken 1/13/2025 1550 by Martine Archer RN  Moisture Interventions: Encourage regular toileting  Intervention: Optimize Skin Protection  Recent Flowsheet Documentation  Taken 1/13/2025 1758 by Martine Archer RN  Activity Management:   ambulated outside room   back to bed  Head of Bed (HOB) Positioning: HOB at 30-45 degrees  Taken 1/13/2025 1550 by Martine Archer RN  Activity Management: activity adjusted per tolerance  Head of Bed (HOB) Positioning: HOB at 60-90 degrees     Problem: Gas Exchange Impaired  Goal: Optimal Gas Exchange  Outcome: Progressing  Intervention: Optimize Oxygenation and Ventilation  Recent Flowsheet Documentation  Taken 1/13/2025 1758 by Martine Archer RN  Head of Bed (HOB) Positioning: HOB at 30-45 degrees  Taken 1/13/2025 1550 by Martine Archer RN  Head of Bed (HOB) Positioning: HOB at 60-90 degrees

## 2025-01-14 NOTE — PROGRESS NOTES
"CLINICAL NUTRITION SERVICES - BRIEF NOTE     Chart reviewed for LOS.  Weights, I/Os, and skin reviewed.  Documented good appetite and 100% intake. Per [facility] review, pt ordering 2-3 well portioned meals per day.   Wt appears stable. No pressure injuries or significant edema documented.       RD will continue to stay signed off at this time. RD will re-evaluate patient in 7-10 days per policy guidelines, unless consulted sooner.      Danna Shafer RD, LD  Clinical Dietitian  [facility] Message Group: \"Dietitian [Surjit]\"  Office Phone: [phone]  Pagers: 3rd floor/ICU: [phone]  All other floors: [phone]  Weekend/holiday: [phone]    "

## 2025-01-14 NOTE — PROGRESS NOTES
Care Management Discharge Note    Discharge Date: 01/14/2025       Discharge Disposition: Outpatient Rehab (PT, OT, SLP, Cardiac or Pulmonary)    Discharge Services:      Discharge DME:      Discharge Transportation:      Private pay costs discussed: Not applicable      Education Provided on the Discharge Plan:  yes  Persons Notified of Discharge Plans: patient and [facility]   Patient/Family in Agreement with the Plan: yes    Handoff Referral Completed: No, handoff not indicated or clinically appropriate    Additional Information:  Ready for discharge today. Plan on back home to [facility]. As discussed with patient previously should would like to continue with [facility] which is their in house therapy.  Discharge orders faxed to [facility] [phone].      Jenn Will RN BSN OCN  Care Coordinator  [facility]  [phone]        Jenn Will, RN

## 2025-01-14 NOTE — DISCHARGE SUMMARY
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 Recent admission with same issues, viral and culture workup negative. Appears to have new lung infiltrate that could represent pneumonia.  She was treated with meropenem.  ID was consulted.  Patient was afebrile for 24+ hours off of abx prior to discharge home.      Fever-resolved  Right lowe lobe infiltrate aspiration vs HAP ( this was noted on CT scan from 12/10/24)  Patient presented for the 2nd time in less than a week with fever and prior admission work up was largely negative with continued negative blood cultures earlier.  CXR shows new airspace disease in RLL but there was mention of small ground glass opacities on the right lower lobe. Patient also endorsed bladder spasms morning of 1/9, which she states only happens when she has a UTI. Will culture urine from admission, although UA looked clean.  ID consulted and recommended no further abx.  Fungal studies ordered byt not yet resulted.  Now afebrile for 24+ hours after discontinuing abx and discharged home in stable condition.      Bilateral LE erythema  Venous stasis changes vs cellulitis  Appearance can be due to stasis dermatitis given its symmetry but in setting of ongoing fever, will monitor and treat for soft tissue infection as well.   Completed 7 days of meropenem as above     History of PBC  S/P liver transplant  Continue  "Urosdiol  Continue Prednisone  Continue Sirolimus, check levels     Hx of DVT  Continue apixaban     PAF  Continue Toprol XL 25 mg daily  Continue apixaban     Hypothyroidism  Continue Synthroid replacement     DM type II  Continue linagliptin  Mod CHO diet  Accucheck BID       Clinically Significant Risk Factors     # DMII: A1C = 6.9 % (Ref range: <5.7 %) within past 6 months  # Overweight: Estimated body mass index is 29.87 kg/m  as calculated from the following:    Height as of this encounter: 1.702 m (5' 7\").    Weight as of this encounter: 86.5 kg (190 lb 11.2 oz).       Follow-ups Needed After Discharge   Follow-up Appointments       Follow-up and recommended labs and tests       Follow up with primary care provider, Daniel Hidalgo, within 7 days for hospital follow- up.  No follow up labs or test are needed.                Unresulted Labs Ordered in the Past 30 Days of this Admission       Date and Time Order Name Status Description    1/13/2025 12:49 PM Blastomyces Agn Quant EIA Non Blood In process     1/13/2025 12:49 PM Blastomyces Agn Quant EIA Blood In process     1/13/2025 12:49 PM Histoplasma Galactomannan Antigen Quant by EIA, Urine In process     1/13/2025 12:49 PM Histoplasma capsulatum antigen In process     1/13/2025 12:49 PM Fungal Antibodies In process         These results will be followed up by PCP, ID    Discharge Disposition   Discharged to home  Condition at discharge: Stable    Consultations This Hospital Stay   PHARMACY TO DOSE VANCO  PHYSICAL THERAPY ADULT IP CONSULT  SPEECH LANGUAGE PATH ADULT IP CONSULT  OCCUPATIONAL THERAPY ADULT IP CONSULT  PHARMACY TO DOSE VANCO  CARE MANAGEMENT / SOCIAL WORK IP CONSULT  INFECTIOUS DISEASES IP CONSULT  OCCUPATIONAL THERAPY ADULT IP CONSULT    Code Status   Full Code    Time Spent on this Encounter   IMaik DO, personally saw the patient today and spent greater than 30 minutes discharging this patient.       Maik Chino DO   HEALTH " [place] [address] MEDICAL SURGICAL  [address] [facility] [address]  Phone: [phone]  Fax: [phone]  ______________________________________________________________________    Physical Exam   Vital Signs: Temp: 98.5  F (36.9  C) Temp src: Oral BP: 137/64 Pulse: 68   Resp: 18 SpO2: 96 % O2 Device: None (Room air) Oxygen Delivery: 2 LPM  Weight: 190 lbs 11.17 oz  General Appearance: Patient awake & alert.  No apparent distress.   Respiratory: Lungs are CTAB.  Work of breathing appears normal on room air.  Cardiovascular: Regular rate and rhythm.  No murmurs rubs or gallops.  There is no edema present.  GI: Benign.  Soft.  Non-tender.  Bowel sounds active.   Skin: No rashes or lesions exposed skin.  Neuro:  The patient is moving all extremities.  No obvious focal asymmetries.   Other: Patient is appropriate and oriented x3.        Primary Care Physician   Daniel Hidalgo    Discharge Orders      Med Therapy Management Referral      Reason for your hospital stay    You were hospitalized for fever.  No specific infection was found.     Follow-up and recommended labs and tests     Follow up with primary care provider, Daniel Hidalgo, within 7 days for hospital follow- up.  No follow up labs or test are needed.     Activity    Your activity upon discharge: activity as tolerated     Diet    Follow this diet upon discharge:       Moderate Consistent Carb (60 g CHO per Meal) Diet       Significant Results and Procedures   Most Recent 3 CBC's:  Recent Labs   Lab Test 01/13/25  0705 01/12/25  0705 01/11/25  0931   WBC 6.6 6.1 7.0   HGB 8.9* 8.2* 9.2*   MCV 91 90 90    361 377     Most Recent 3 BMP's:  Recent Labs   Lab Test 01/14/25  0929 01/13/25  2136 01/13/25  1314 01/13/25  0705 01/12/25  0839 01/12/25  0705 01/11/25  1817 01/11/25  0931   NA  --   --   --  140  --  140  --  143   POTASSIUM  --   --   --  4.5  --  4.1  --  4.2   CHLORIDE  --   --   --  107  --  109*  --  109*   CO2  --   --   --  22   --  20*  --  20*   BUN  --   --   --  35.8*  --  36.9*  --  36.4*   CR  --   --   --  1.44*  --  1.41*  --  1.47*   ANIONGAP  --   --   --  11  --  11  --  14   KEILY  --   --   --  9.1  --  8.9  --  9.3   * 177* 163* 97   < > 121*   < > 91    < > = values in this interval not displayed.       Discharge Medications   Current Discharge Medication List        CONTINUE these medications which have NOT CHANGED    Details   amLODIPine (NORVASC) 5 MG tablet Take 0.5 tablets (2.5 mg) by mouth daily.  Qty: 30 tablet, Refills: 0    Associated Diagnoses: Transient hypotension; Paroxysmal atrial fibrillation (H)      apixaban ANTICOAGULANT (ELIQUIS ANTICOAGULANT) 2.5 MG tablet Take 1 tablet (2.5 mg) by mouth 2 times daily.  Qty: 180 tablet, Refills: 1    Associated Diagnoses: Paroxysmal atrial fibrillation (H)      calcitRIOL (ROCALTROL) 0.25 MCG capsule Take 1 capsule (0.25 mcg) by mouth daily.  Qty: 90 capsule, Refills: 1    Associated Diagnoses: Papillary thyroid carcinoma (H)      D-MANNOSE PO Take 1 Scoop by mouth 2 times daily.      EPINEPHrine (ANY BX GENERIC EQUIV) 0.3 MG/0.3ML injection 2-pack Inject 0.3 mLs (0.3 mg) into the muscle as needed for anaphylaxis. May repeat one time in 5-15 minutes if response to initial dose is inadequate.  Qty: 2 each, Refills: 1    Associated Diagnoses: Anaphylaxis, sequela      estradiol (ESTRACE) 0.1 MG/GM vaginal cream Place vaginally every other day.      evolocumab (REPATHA SURECLICK) 140 MG/ML prefilled autoinjector Inject 1 mL (140 mg) subcutaneously every 14 days. . Please have fasting labs drawn for further refills.  Qty: 6 mL, Refills: 3    Associated Diagnoses: Hyperlipidemia LDL goal <70; Statin intolerance; HDL deficiency; Type 2 diabetes mellitus with stage 3 chronic kidney disease, without long-term current use of insulin (H); Hypertriglyceridemia; H/O: CVA (cerebrovascular accident)      ezetimibe (ZETIA) 10 MG tablet Take 1 tablet (10 mg) by mouth daily.  Qty: 90  tablet, Refills: 3    Associated Diagnoses: Hyperlipidemia LDL goal <70; Benign essential hypertension      Ferrous Sulfate 324 MG TBEC Take 1 tablet by mouth 2 times daily as needed.      folic acid (FOLVITE) 1 MG tablet Take 1 tablet (1 mg) by mouth daily.  Qty: 100 tablet, Refills: 0    Associated Diagnoses: Liver replaced by transplant (H)      gabapentin (NEURONTIN) 250 MG/5ML solution Take 6 mLs (300 mg) by mouth at bedtime  Qty: 180 mL, Refills: 0    Associated Diagnoses: Liver replaced by transplant (H)      glucose (BD GLUCOSE) 5 g chewable tablet Take 2 tablets (10 g) by mouth as needed (low blood sugar)  Qty: 40 tablet, Refills: 1    Associated Diagnoses: Type 2 diabetes mellitus with stage 3 chronic kidney disease, without long-term current use of insulin (H)      hypromellose (ARTIFICIAL TEARS) 0.4 % SOLN ophthalmic solution Apply 1 drop to eye every hour as needed for dry eyes    Associated Diagnoses: Liver replaced by transplant (H)      ketoconazole (NIZORAL) 2 % external cream Apply topically 2 times daily as needed (redness and flaking). Apply to the face.  Qty: 30 g, Refills: 3    Associated Diagnoses: Seborrheic dermatitis      ketoconazole (NIZORAL) 2 % external shampoo Apply topically three times a week. Lather in the shower, wait 3-5 minutes before rinsing.  Qty: 100 mL, Refills: 11    Associated Diagnoses: Seborrheic dermatitis      levothyroxine (SYNTHROID/LEVOTHROID) 175 MCG tablet Take 1 tablet (175 mcg) by mouth daily.  Qty: 90 tablet, Refills: 1    Associated Diagnoses: Postoperative hypothyroidism      linagliptin (TRADJENTA) 5 MG TABS tablet Take 1 tablet (5 mg) by mouth daily.  Qty: 90 tablet, Refills: 1    Associated Diagnoses: Postoperative hypothyroidism; Papillary thyroid carcinoma (H); Type 2 diabetes mellitus with stage 3 chronic kidney disease, without long-term current use of insulin (H)      meclizine (ANTIVERT) 25 MG tablet Take 1 tablet (25 mg) by mouth as needed for  dizziness or other (migraines)  Qty: 30 tablet, Refills: 11    Associated Diagnoses: Dizziness      metoprolol succinate ER (TOPROL XL) 25 MG 24 hr tablet Take 1 tablet (25 mg) by mouth daily. Take an extra tablet daily as needed for breakthrough afib. May repeat in two hours if heart rate remains >100bpm (no more than 4 tablets per day).    Associated Diagnoses: Paroxysmal atrial fibrillation (H)      Multiple Vitamins-Minerals (PRESERVISION AREDS 2) CAPS Take 1 capsule by mouth 2 times daily    Associated Diagnoses: Liver replaced by transplant (H)      Nutrisource Fiber PO packet Take 1 packet by mouth daily.      omega-3 acid ethyl esters (LOVAZA) 1 g capsule Take 1 capsule by mouth 2 times daily  Qty: 180 capsule, Refills: 1    Associated Diagnoses: Hypertriglyceridemia      predniSONE (DELTASONE) 10 MG tablet Take 1 tablet (10 mg) by mouth daily.  Qty: 90 tablet, Refills: 3    Comments: TXP DT 5/22/2002 (Liver) TXP Dischg DT 6/3/2002 DX Liver replaced by transplant Z94.4 TX [facility] ([place], [place])  Associated Diagnoses: Liver replaced by transplant (H)      sirolimus (GENERIC EQUIVALENT) 1 MG tablet Take 1 tablet (1 mg) by mouth daily.  Qty: 90 tablet, Refills: 3    Comments: TXP DT 5/22/2002 (Liver) TXP Dischg DT 6/3/2002 DX Liver replaced by transplant Z94.4 TX [facility] ([place])  Associated Diagnoses: Liver replaced by transplant (H)      ursodiol (ACTIGALL) 250 MG tablet Take 1 tablet (250 mg) by mouth 2 times daily.  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  Qty: 2 each, Refills: 5    Comments: Medicare B  Associated Diagnoses: Postoperative hypothyroidism; Papillary thyroid carcinoma (H); Type 2 diabetes mellitus with stage 3 chronic kidney disease, without long-term  current use of insulin (H)           Allergies   Allergies   Allergen Reactions    Fluconazole Hives and Itching     Full body hives    [See intradermal skin testing results from 8/2/2019]    Mycophenolate Diarrhea and Nausea and Vomiting     Patient stated it was chronic and lasted months      Penicillins Anaphylaxis, Hives, Itching and Rash     [See intradermal skin testing results from 8/2/2019]      Simvastatin Muscle Pain (Myalgia)     severe  Other reaction(s): Myalgia caused by statin    Methotrexate Other (See Comments)     Other reaction(s): Sore  Sores in mouth, esophagus, and stomach.       Morphine And Codeine Itching and Other (See Comments)     Psych disturbance  Other reaction(s): Confusion, Mood alteration    Quinolones Anxiety, Dizziness, Headache, Other (See Comments), Palpitations and Unknown     Other reaction(s): Hyperactive behavior, Lightheadedness, Mood alteration    Dizzy, light headed    Dizziness, shaky, and jumpy    Benadryl [Diphenhydramine Hcl]      Insomnia     Capsules, Empty Gelatin [Gelatin]     Lansoprazole Diarrhea    Azithromycin Itching     [See intradermal skin testing results from 8/2/2019]    Bactrim [Sulfamethoxazole-Trimethoprim] Other (See Comments)     Numb mouth, tingling lips (treated with anti-histamines)    Cephalosporins Itching     [See intradermal skin testing results from 8/2/2019]    Ciprofloxacin Hcl Other (See Comments) and Dizziness     Insomnia, mood lability, Irregular heart beat         Doxycycline Itching and Unknown     [See intradermal skin testing results from 8/2/2019]    Lisinopril Cough    Omeprazole Itching    Tolectin [Nsaids] Rash    Tolmetin Rash and Itching    Tramadol Rash, Hives and Itching

## 2025-01-14 NOTE — PLAN OF CARE
"Pt is alert and oriented x4. Pt denies pain or discomfort. Vitals stable. Pt is on contact isolation for ESBL. Pt is on blood sugar checks. Pt is hard of hearing. Pt is on 02 2L NC at bedtime. Pt tolerating moderate consistent carb diet. Pt denies nausea of vomiting. No fevers noted during the shift. Pt has a pure wick in place. Pt is assist SBA in transfer and cares. Pt is sleeping in bed. Bed alarm in place and call light within reach.               Goal Outcome Evaluation:      Plan of Care Reviewed With: patient    Overall Patient Progress: improvingOverall Patient Progress: improving    Outcome Evaluation: no fevers noted during the shift. pt is on blood sugar checks.      Problem: Adult Inpatient Plan of Care  Goal: Plan of Care Review  Description: The Plan of Care Review/Shift note should be completed every shift.  The Outcome Evaluation is a brief statement about your assessment that the patient is improving, declining, or no change.  This information will be displayed automatically on your shift  note.  Outcome: Progressing  Flowsheets (Taken 1/14/2025 0048)  Outcome Evaluation: no fevers noted during the shift. pt is on blood sugar checks.  Plan of Care Reviewed With: patient  Overall Patient Progress: improving  Goal: Patient-Specific Goal (Individualized)  Description: You can add care plan individualizations to a care plan. Examples of Individualization might be:  \"Parent requests to be called daily at 9am for status\", \"I have a hard time hearing out of my right ear\", or \"Do not touch me to wake me up as it startles  me\".  Outcome: Progressing  Goal: Absence of Hospital-Acquired Illness or Injury  Outcome: Progressing  Intervention: Identify and Manage Fall Risk  Recent Flowsheet Documentation  Taken 1/13/2025 2341 by Zhao Alvarez RN  Safety Promotion/Fall Prevention:   assistive device/personal items within reach   activity supervised   clutter free environment maintained   increased " rounding and observation   increase visualization of patient   nonskid shoes/slippers when out of bed   patient and family education   safety round/check completed  Intervention: Prevent Skin Injury  Recent Flowsheet Documentation  Taken 1/13/2025 2341 by Zhao Alvarez RN  Body Position: position changed independently  Intervention: Prevent and Manage VTE (Venous Thromboembolism) Risk  Recent Flowsheet Documentation  Taken 1/13/2025 2341 by Zhao Alvarez RN  VTE Prevention/Management: SCDs off (sequential compression devices)  Intervention: Prevent Infection  Recent Flowsheet Documentation  Taken 1/13/2025 2341 by Zhao Alvarez RN  Infection Prevention:   single patient room provided   rest/sleep promoted   hand hygiene promoted  Goal: Optimal Comfort and Wellbeing  Outcome: Progressing  Goal: Readiness for Transition of Care  Outcome: Progressing     Problem: Skin Injury Risk Increased  Goal: Skin Health and Integrity  Outcome: Progressing  Intervention: Plan: Nurse Driven Intervention: Moisture Management  Recent Flowsheet Documentation  Taken 1/13/2025 2341 by Zhao Alvarez RN  Moisture Interventions: Encourage regular toileting  Intervention: Optimize Skin Protection  Recent Flowsheet Documentation  Taken 1/13/2025 2341 by Zhao Alvarez RN  Activity Management:   activity adjusted per tolerance   activity encouraged  Head of Bed (HOB) Positioning: HOB at 30-45 degrees     Problem: Gas Exchange Impaired  Goal: Optimal Gas Exchange  Outcome: Progressing  Intervention: Optimize Oxygenation and Ventilation  Recent Flowsheet Documentation  Taken 1/13/2025 2341 by Zhao Alvarez RN  Head of Bed (HOB) Positioning: HOB at 30-45 degrees     Problem: Pain Acute  Goal: Optimal Pain Control and Function  Outcome: Progressing  Intervention: Prevent or Manage Pain  Recent Flowsheet Documentation  Taken 1/13/2025 2341 by Zhao Alvarez  RN  Medication Review/Management: medications reviewed     Problem: Comorbidity Management  Goal: Maintenance of Asthma Control  Outcome: Progressing  Intervention: Maintain Asthma Symptom Control  Recent Flowsheet Documentation  Taken 1/13/2025 2341 by Zhao Alvarez RN  Medication Review/Management: medications reviewed  Goal: Maintenance of Behavioral Health Symptom Control  Outcome: Progressing  Intervention: Maintain Behavioral Health Symptom Control  Recent Flowsheet Documentation  Taken 1/13/2025 2341 by Zhao Alvarez RN  Medication Review/Management: medications reviewed  Goal: Maintenance of COPD Symptom Control  Outcome: Progressing  Intervention: Maintain COPD Symptom Control  Recent Flowsheet Documentation  Taken 1/13/2025 2341 by Zhao Alvarez RN  Medication Review/Management: medications reviewed  Goal: Blood Glucose Levels Within Targeted Range  Outcome: Progressing  Intervention: Monitor and Manage Glycemia  Recent Flowsheet Documentation  Taken 1/13/2025 2341 by Zhao Alvarez RN  Medication Review/Management: medications reviewed  Goal: Maintenance of Heart Failure Symptom Control  Outcome: Progressing  Intervention: Maintain Heart Failure Management  Recent Flowsheet Documentation  Taken 1/13/2025 2341 by Zhao Alvarez RN  Medication Review/Management: medications reviewed  Goal: Blood Pressure in Desired Range  Outcome: Progressing  Intervention: Maintain Blood Pressure Management  Recent Flowsheet Documentation  Taken 1/13/2025 2341 by Zhao lAvarez RN  Medication Review/Management: medications reviewed  Goal: Maintenance of Osteoarthritis Symptom Control  Outcome: Progressing  Intervention: Maintain Osteoarthritis Symptom Control  Recent Flowsheet Documentation  Taken 1/13/2025 2341 by Zhao Alvarez RN  Assistive Device Utilized:   walker   gait belt  Activity Management:   activity adjusted per tolerance    activity encouraged  Medication Review/Management: medications reviewed  Goal: Bariatric Home Regimen Maintained  Outcome: Progressing  Intervention: Maintain and Manage Postbariatric Surgery Care  Recent Flowsheet Documentation  Taken 1/13/2025 2341 by Zhao Alvarez RN  Medication Review/Management: medications reviewed  Goal: Maintenance of Seizure Control  Outcome: Progressing  Intervention: Maintain Seizure Symptom Control  Recent Flowsheet Documentation  Taken 1/13/2025 2341 by Zhao Alvarez RN  Medication Review/Management: medications reviewed     Problem: Fall Injury Risk  Goal: Absence of Fall and Fall-Related Injury  Outcome: Progressing  Intervention: Identify and Manage Contributors  Recent Flowsheet Documentation  Taken 1/13/2025 2341 by Zhao Alvarez RN  Medication Review/Management: medications reviewed  Intervention: Promote Injury-Free Environment  Recent Flowsheet Documentation  Taken 1/13/2025 2341 by Zhao Alvarez RN  Safety Promotion/Fall Prevention:   assistive device/personal items within reach   activity supervised   clutter free environment maintained   increased rounding and observation   increase visualization of patient   nonskid shoes/slippers when out of bed   patient and family education   safety round/check completed     Problem: Adult Inpatient Plan of Care  Goal: Plan of Care Review  Description: The Plan of Care Review/Shift note should be completed every shift.  The Outcome Evaluation is a brief statement about your assessment that the patient is improving, declining, or no change.  This information will be displayed automatically on your shift  note.  Outcome: Progressing  Flowsheets (Taken 1/14/2025 0048)  Outcome Evaluation: no fevers noted during the shift. pt is on blood sugar checks.  Plan of Care Reviewed With: patient  Overall Patient Progress: improving  Goal: Absence of Hospital-Acquired Illness or Injury  Intervention:  Identify and Manage Fall Risk  Recent Flowsheet Documentation  Taken 1/13/2025 2341 by Zhao Alvarez RN  Safety Promotion/Fall Prevention:   assistive device/personal items within reach   activity supervised   clutter free environment maintained   increased rounding and observation   increase visualization of patient   nonskid shoes/slippers when out of bed   patient and family education   safety round/check completed  Intervention: Prevent Skin Injury  Recent Flowsheet Documentation  Taken 1/13/2025 2341 by Zhao Alvarez RN  Body Position: position changed independently  Intervention: Prevent and Manage VTE (Venous Thromboembolism) Risk  Recent Flowsheet Documentation  Taken 1/13/2025 2341 by Zhao Alvarez RN  VTE Prevention/Management: SCDs off (sequential compression devices)  Intervention: Prevent Infection  Recent Flowsheet Documentation  Taken 1/13/2025 2341 by Zhao Alvarez RN  Infection Prevention:   single patient room provided   rest/sleep promoted   hand hygiene promoted     Problem: Skin Injury Risk Increased  Goal: Skin Health and Integrity  Intervention: Plan: Nurse Driven Intervention: Moisture Management  Recent Flowsheet Documentation  Taken 1/13/2025 2341 by Zhao Alvarez RN  Moisture Interventions: Encourage regular toileting  Intervention: Optimize Skin Protection  Recent Flowsheet Documentation  Taken 1/13/2025 2341 by Zhao Alvarez RN  Activity Management:   activity adjusted per tolerance   activity encouraged  Head of Bed (HOB) Positioning: HOB at 30-45 degrees     Problem: Gas Exchange Impaired  Goal: Optimal Gas Exchange  Intervention: Optimize Oxygenation and Ventilation  Recent Flowsheet Documentation  Taken 1/13/2025 2341 by Zhao Alvarez RN  Head of Bed (HOB) Positioning: HOB at 30-45 degrees     Problem: Pain Acute  Goal: Optimal Pain Control and Function  Intervention: Prevent or Manage Pain  Recent  Flowsheet Documentation  Taken 1/13/2025 2341 by Zhao Alvarez RN  Medication Review/Management: medications reviewed     Problem: Comorbidity Management  Goal: Maintenance of Asthma Control  Intervention: Maintain Asthma Symptom Control  Recent Flowsheet Documentation  Taken 1/13/2025 2341 by Zhao Alvarez RN  Medication Review/Management: medications reviewed  Goal: Maintenance of Behavioral Health Symptom Control  Intervention: Maintain Behavioral Health Symptom Control  Recent Flowsheet Documentation  Taken 1/13/2025 2341 by Zhao Alvarez RN  Medication Review/Management: medications reviewed  Goal: Maintenance of COPD Symptom Control  Intervention: Maintain COPD Symptom Control  Recent Flowsheet Documentation  Taken 1/13/2025 2341 by Zhao Alvarez RN  Medication Review/Management: medications reviewed  Goal: Blood Glucose Levels Within Targeted Range  Intervention: Monitor and Manage Glycemia  Recent Flowsheet Documentation  Taken 1/13/2025 2341 by Zhao Alvarez RN  Medication Review/Management: medications reviewed  Goal: Maintenance of Heart Failure Symptom Control  Intervention: Maintain Heart Failure Management  Recent Flowsheet Documentation  Taken 1/13/2025 2341 by Zhao Alvarez RN  Medication Review/Management: medications reviewed  Goal: Blood Pressure in Desired Range  Intervention: Maintain Blood Pressure Management  Recent Flowsheet Documentation  Taken 1/13/2025 2341 by Zhao Alvarez RN  Medication Review/Management: medications reviewed  Goal: Maintenance of Osteoarthritis Symptom Control  Intervention: Maintain Osteoarthritis Symptom Control  Recent Flowsheet Documentation  Taken 1/13/2025 2341 by Zhao Alvarez RN  Assistive Device Utilized:   walker   gait belt  Activity Management:   activity adjusted per tolerance   activity encouraged  Medication Review/Management: medications reviewed  Goal:  Bariatric Home Regimen Maintained  Intervention: Maintain and Manage Postbariatric Surgery Care  Recent Flowsheet Documentation  Taken 1/13/2025 2341 by Zhao Alvarez RN  Medication Review/Management: medications reviewed  Goal: Maintenance of Seizure Control  Intervention: Maintain Seizure Symptom Control  Recent Flowsheet Documentation  Taken 1/13/2025 2341 by Zhao Alvarez RN  Medication Review/Management: medications reviewed     Problem: Fall Injury Risk  Goal: Absence of Fall and Fall-Related Injury  Intervention: Identify and Manage Contributors  Recent Flowsheet Documentation  Taken 1/13/2025 2341 by Zhao Alvarez RN  Medication Review/Management: medications reviewed  Intervention: Promote Injury-Free Environment  Recent Flowsheet Documentation  Taken 1/13/2025 2341 by Zhao Alvarez RN  Safety Promotion/Fall Prevention:   assistive device/personal items within reach   activity supervised   clutter free environment maintained   increased rounding and observation   increase visualization of patient   nonskid shoes/slippers when out of bed   patient and family education   safety round/check completed

## 2025-01-14 NOTE — PLAN OF CARE
Occupational Therapy: Orders received. Chart reviewed and discussed with care team.? Occupational Therapy not indicated. Patient expresses being familiar with OT role and she declines OT services this stay. Has all equipment needs met.? Will defer IP therapy to PT. Defer discharge recommendations to PT/care team.? Will complete orders.

## 2025-01-14 NOTE — DISCHARGE SUMMARY
Discharge Note    Patient discharged to home via private vehicle  accompanied by daughter.  IV: Discontinued  Prescriptions N/A.   Belongings reviewed and sent with patient.   Home medications returned to patient: NA  Equipment sent with: patient.   patient verbalizes understanding of discharge instructions. AVS given to patient.  Additional education completed?  Follow up education given. All questions answered at time of discharge.

## 2025-01-14 NOTE — CONSULTS
[facility]    Infectious Disease Consultation     Date of Admission:  1/6/2025  Date of Consult (When I saw the patient): 01/14/25    Assessment & Plan   Luz Thompson is a 74 year old female who was admitted on 1/6/2025.     Impression: 1 74-year-old female with complicated medical history primarily intervention [place], currently presents with low-grade fever, fever symptoms, slight respiratory symptoms but not obvious focal infection, significant workup over 2 admissions with no clear or obvious cause  2  prior liver transplant on chronic immunosuppression, significant historical infection problems  3  recurrent UTIs including ESBL organisms, currently urinalysis and symptoms suggest that not the diagnosis  4  Sjogren's syndrome  5  multiple antibiotic allergies and intolerances, including doxycycline, penicillin, quinolones, cephalosporins, fluconazole, Bactrim, several are numb lips the fluconazole was rash but tolerated voriconazole  6  diabetes mellitus  7  prior history of coccidiomycosis well in [place], significant long-term treatment in [place], no recurrences  8  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    REC 1  persistent fever symptoms although not documented true fever, may be slight respiratory symptoms but not obvious major pneumonia, no response to those symptoms from antibiotics, procalcitonin 0, white count normal and no positive microbiology unlikely bacterial infection  2  expand workup with fungal serologies, check CRP, further workup to be determined by whether significant ongoing fever  3  patient expresses desire for [place] to weigh in on the case, probably okay for discharge and follow-up outpatient at the  following temperatures as an outpatient  4  near 1 week of meropenem without significant impact on overall problem and no obvious infection stop antibiotics and watch off        Jevon Johnson MD    Reason for Consult   Reason for  "consult: I was asked to evaluate this patient for  fever.    Primary Care Physician   Daniel Hidalgo    Chief Complaint    Fever  symptoms    History is obtained from the patient and medical records    History of Present Illness   Luz Thompson is a 74 year old female who presents with  history of liver transplant in 2002, posttransplant has had significant infection problems mostly conventional infections but also some immunosuppressant type conditions.  She is particular had significant urinary tract problems including recently ESBL infection treated at [facility].  Most of her treatment has been on years they were there more than 100 infectious disease notes over the last 20 years.  The patient currently presents with low-grade fever and fever symptoms.  May be slight respiratory symptoms but not prominent.  Diagnosed as possible pneumonia based on slight infiltrate on chest x-ray but not obvious that is the diagnosis.  Has been on meropenem for the last week still having fever symptoms although Tmax has been 100 degrees in the hospital.  No recent travel or exposures to other people should have been around.  Her historical infection problems include coccidiomycosis approximately 9 years ago which time she had an extended course of treatment, primarily voriconazole due to fluconazole allergic reaction.  Of note she has significant allergy history to multiple antimicrobials as well.  No other new focal or localizing symptoms to direct workup further.    Past Medical History   I have reviewed this patient's medical history and updated it with pertinent information if needed.   Past Medical History:   Diagnosis Date    Anemia of chronic disease 10/17/2011    Anxiety     CKD (chronic kidney disease) stage 3, GFR 30-59 ml/min (H) 04/04/2012    Coccidioidomycosis, history of 01/23/2017    CVA (cerebral vascular accident) (H) 2001    when BP was very low, small multiple infacts in frontal lobe, had \"visual field cut,\" " leg weakness, and expressive aphasia - all have resolved.     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  Past Surgical History:   Procedure Laterality Date    APPENDECTOMY  1961    CATARACT IOL, RT/LT      RE12/19/2013, LE12/10/2013 - Toric lenses    CHOLECYSTECTOMY  1991    COLONOSCOPY  03/10/2014    Procedure: COLONOSCOPY;;  Surgeon: Gentry Ramirez MD;  Location:  GI    CYSTOSCOPY      ear drum repair      ENDOBRONCHIAL ULTRASOUND FLEXIBLE N/A 09/29/2017    Procedure: ENDOBRONCHIAL ULTRASOUND FLEXIBLE;  Flexible Bronchoscopy, Endobronchial Ultrasound, Transbronchial Needle Aspiration ;  Surgeon: Eden Clinton MD;  Location:  OR    ENDOSCOPIC RETROGRADE CHOLANGIOPANCREATOGRAM  09/19/2013    Procedure: ENDOSCOPIC RETROGRADE CHOLANGIOPANCREATOGRAM;  Endoscopic Retrograde Cholangiopancreatogram with single balloon enteroscopy, ballon sweep of bile duct;  Surgeon: Brett Membreno MD;   Location: [facility] OR    HC KNEE SCOPE,MED/LAT MENISECTOMY Right 08/10/2012    partial medial menisectomy only    KNEE SURGERY  1966    R knee    PICC INSERTION  09/18/2013    4fr SL PASV PICC, 40cm (1cm external) in the R basilic vein w/ tip in the low SVC    PICC INSERTION  02/21/2014    5 fr DL BioFlo Navilyst PICC, 46 cm (3 cm external) in the L basilic vein w/ tip in the SVC RA junction.    THYROIDECTOMY  03/2010    TRANSPLANT LIVER RECIPIENT LIVING UNRELATED  05/2002       Prior to Admission Medications   Prior to Admission Medications   Prescriptions Last Dose Informant Patient Reported? Taking?   Continuous Glucose Sensor (FREESTYLE NINO 2 SENSOR) MISC   No No   Sig: Change every 14 days.   D-MANNOSE PO Past Week  Yes Yes   Sig: Take 1 Scoop by mouth 2 times daily.   EPINEPHrine (ANY BX GENERIC EQUIV) 0.3 MG/0.3ML injection 2-pack   No Yes   Sig: Inject 0.3 mLs (0.3 mg) into the muscle as needed for anaphylaxis. May repeat one time in 5-15 minutes if response to initial dose is inadequate.   Ferrous Sulfate 324 MG TBEC Past Month  Yes Yes   Sig: Take 1 tablet by mouth 2 times daily as needed.   Multiple Vitamins-Minerals (PRESERVISION AREDS 2) CAPS 1/5/2025 Evening  No Yes   Sig: Take 1 capsule by mouth 2 times daily   Nutrisource Fiber PO packet 1/6/2025 Morning  Yes Yes   Sig: Take 1 packet by mouth daily.   amLODIPine (NORVASC) 5 MG tablet 1/6/2025 Morning  No Yes   Sig: Take 0.5 tablets (2.5 mg) by mouth daily.   apixaban ANTICOAGULANT (ELIQUIS ANTICOAGULANT) 2.5 MG tablet 1/5/2025 Evening  No Yes   Sig: Take 1 tablet (2.5 mg) by mouth 2 times daily.   calcitRIOL (ROCALTROL) 0.25 MCG capsule 1/5/2025 Morning  No Yes   Sig: Take 1 capsule (0.25 mcg) by mouth daily.   estradiol (ESTRACE) 0.1 MG/GM vaginal cream 1/5/2025 Evening  Yes Yes   Sig: Place vaginally every other day.   evolocumab (REPATHA SURECLICK) 140 MG/ML prefilled autoinjector Past Month  No Yes   Sig: Inject 1 mL (140 mg) subcutaneously every 14  days. . Please have fasting labs drawn for further refills.   ezetimibe (ZETIA) 10 MG tablet 1/5/2025 Evening  No Yes   Sig: Take 1 tablet (10 mg) by mouth daily.   folic acid (FOLVITE) 1 MG tablet 1/5/2025 Morning  No Yes   Sig: Take 1 tablet (1 mg) by mouth daily.   gabapentin (NEURONTIN) 250 MG/5ML solution Past Week  No Yes   Sig: Take 6 mLs (300 mg) by mouth at bedtime   glucose (BD GLUCOSE) 5 g chewable tablet   No Yes   Sig: Take 2 tablets (10 g) by mouth as needed (low blood sugar)   hypromellose (ARTIFICIAL TEARS) 0.4 % SOLN ophthalmic solution More than a month  No Yes   Sig: Apply 1 drop to eye every hour as needed for dry eyes   ketoconazole (NIZORAL) 2 % external cream 1/6/2025 Morning  No Yes   Sig: Apply topically 2 times daily as needed (redness and flaking). Apply to the face.   ketoconazole (NIZORAL) 2 % external shampoo Past Week  No Yes   Sig: Apply topically three times a week. Lather in the shower, wait 3-5 minutes before rinsing.   levothyroxine (SYNTHROID/LEVOTHROID) 175 MCG tablet 1/6/2025 Morning  No Yes   Sig: Take 1 tablet (175 mcg) by mouth daily.   linagliptin (TRADJENTA) 5 MG TABS tablet 1/5/2025 Morning  No Yes   Sig: Take 1 tablet (5 mg) by mouth daily.   meclizine (ANTIVERT) 25 MG tablet Past Month  No Yes   Sig: Take 1 tablet (25 mg) by mouth as needed for dizziness or other (migraines)   metoprolol succinate ER (TOPROL XL) 25 MG 24 hr tablet 1/6/2025 Morning  Yes Yes   Sig: Take 1 tablet (25 mg) by mouth daily. Take an extra tablet daily as needed for breakthrough afib. May repeat in two hours if heart rate remains >100bpm (no more than 4 tablets per day).   omega-3 acid ethyl esters (LOVAZA) 1 g capsule 1/5/2025 Evening  No Yes   Sig: Take 1 capsule by mouth 2 times daily   predniSONE (DELTASONE) 10 MG tablet 1/5/2025 Morning  No Yes   Sig: Take 1 tablet (10 mg) by mouth daily.   sirolimus (GENERIC EQUIVALENT) 1 MG tablet 1/6/2025 Morning  No Yes   Sig: Take 1 tablet (1 mg) by  mouth daily.   ursodiol (ACTIGALL) 250 MG tablet 1/5/2025 Evening  No Yes   Sig: Take 1 tablet (250 mg) by mouth 2 times daily.      Facility-Administered Medications: None     Allergies   Allergies   Allergen Reactions    Fluconazole Hives and Itching     Full body hives    [See intradermal skin testing results from 8/2/2019]    Mycophenolate Diarrhea and Nausea and Vomiting     Patient stated it was chronic and lasted months      Penicillins Anaphylaxis, Hives, Itching and Rash     [See intradermal skin testing results from 8/2/2019]      Simvastatin Muscle Pain (Myalgia)     severe  Other reaction(s): Myalgia caused by statin    Methotrexate Other (See Comments)     Other reaction(s): Sore  Sores in mouth, esophagus, and stomach.       Morphine And Codeine Itching and Other (See Comments)     Psych disturbance  Other reaction(s): Confusion, Mood alteration    Quinolones Anxiety, Dizziness, Headache, Other (See Comments), Palpitations and Unknown     Other reaction(s): Hyperactive behavior, Lightheadedness, Mood alteration    Dizzy, light headed    Dizziness, shaky, and jumpy    Benadryl [Diphenhydramine Hcl]      Insomnia     Capsules, Empty Gelatin [Gelatin]     Lansoprazole Diarrhea    Azithromycin Itching     [See intradermal skin testing results from 8/2/2019]    Bactrim [Sulfamethoxazole-Trimethoprim] Other (See Comments)     Numb mouth, tingling lips (treated with anti-histamines)    Cephalosporins Itching     [See intradermal skin testing results from 8/2/2019]    Ciprofloxacin Hcl Other (See Comments) and Dizziness     Insomnia, mood lability, Irregular heart beat         Doxycycline Itching and Unknown     [See intradermal skin testing results from 8/2/2019]    Lisinopril Cough    Omeprazole Itching    Tolectin [Nsaids] Rash    Tolmetin Rash and Itching    Tramadol Rash, Hives and Itching       Immunization History   Immunization History   Administered Date(s) Administered    COVID-19 12+ (Pfizer)  10/17/2024    COVID-19 MONOVALENT 12+ (Pfizer) 2021, 03/15/2021, 2021, 10/09/2021    Flu 65+ (Fluad) 2019    T2i5-16 Novel Flu P-free 11/10/2009    HEPA 2001    HepB, Unspecified 2001    Influenza (High Dose) Trivalent,PF (Fluzone) 2015, 10/30/2016, 10/23/2017, 10/24/2024    Influenza (IIV3) PF 11/10/2004, 2007, 10/01/2010, 2012, 10/29/2012, 2013, 2016    Influenza Vaccine 18-64 (Flublok) 10/20/2018    Influenza Vaccine 65+ (Fluzone HD) 2020, 2022, 10/20/2022    Influenza Vaccine, 6+MO IM (QUADRIVALENT W/PRESERVATIVES) 10/01/2020    Pneumo Conj 13-V (2010&after) 2014, 2016    Pneumococcal 23 valent 2007, 2014, 2016    TD,PF 7+ (Tenivac) 2007    TDAP (Adacel,Boostrix) 2014    TDAP Vaccine (Adacel) 2007, 2014       Social History   I have reviewed this patient's social history and updated it with pertinent information if needed. Luz Thompson  reports that she quit smoking about 39 years ago. Her smoking use included cigarettes. She started smoking about 57 years ago. She has a 18 pack-year smoking history. She has never used smokeless tobacco. She reports current alcohol use. She reports that she does not use drugs.    Family History   I have reviewed this patient's family history and updated it with pertinent information if needed.   Family History   Problem Relation Age of Onset    Hypertension Mother     Endometrial Cancer Mother     Hyperlipidemia Mother     Prostate Cancer Father     Macular Degeneration Father     Cancer - colorectal Maternal Grandmother         in her 80's, has surgery and removal of part of kidney,  at age 98    Heart Disease Maternal Grandfather          at 98    Glaucoma Maternal Grandfather     Cerebrovascular Disease Paternal Grandmother         in her 80's    Hypertension Paternal Grandmother     Heart Disease Paternal Grandfather         MI     "Alzheimer Disease Paternal Grandfather     Allergies Son     Neurologic Disorder Daughter         Migraines    Breast Cancer Other     Anesthesia Reaction No family hx of     Crohn's Disease No family hx of     Ulcerative Colitis No family hx of     Melanoma No family hx of     Skin Cancer No family hx of        Review of Systems   The 10 point Review of Systems is negative  except for a sense of fever even when temperatures are in the 99's (normal baseline temperature 97.5).    Physical Exam   Temp: 98.7  F (37.1  C) Temp src: Oral BP: 136/66 Pulse: 68   Resp: 18 SpO2: 97 % O2 Device: Nasal cannula Oxygen Delivery: 2 LPM  Vital Signs with Ranges  Temp:  [98.7  F (37.1  C)-99.2  F (37.3  C)] 98.7  F (37.1  C)  Pulse:  [66-76] 68  Resp:  [16-18] 18  BP: (112-142)/(63-66) 136/66  SpO2:  [88 %-97 %] 97 %  190 lbs 11.17 oz  Body mass index is 29.87 kg/m .    GENERAL APPEARANCE:  awake   Looks relatively well  EYES: Eyes grossly normal to inspection  NECK: no adenopathy  RESP: lungs clear   CV: regular rates and rhythm  LYMPHATICS: normal ant/post cervical and supraclavicular nodes  ABDOMEN: soft, nontender  MS: extremities normal  SKIN: no suspicious lesions or rashes  some stasis to        Data   All laboratory and imaging data in the past 24 hours reviewed  No results for input(s): \"CULT\" in the last 168 hours.  Recent Labs   Lab Test 08/30/19  0657 08/29/19  0544 08/28/19  1718 08/28/19  1710 08/27/19  0224 08/27/19  0150 08/26/19  2331 09/27/18  1912 07/30/18  0852   CULT No growth No growth No growth No growth 10,000 to 50,000 colonies/mL  Escherichia coli  * Cultured on the 1st day of incubation:  Escherichia coli  Susceptibility testing done on previous specimen  *  Critical Value/Significant Value, preliminary result only, called to and read back by   Maria Teresa Martines RN 08/27/2019 @1425 /hdp   Cultured on the 1st day of incubation:  Escherichia coli  *  Critical Value/Significant Value, preliminary result only, " called to and read back by  NENO Robert at [id number]    (Note)  POSITIVE for E.COLI by Verigene multiplex nucleic acid test. Final  identification and antimicrobial susceptibility testing will be  verified by standard methods. Verigene test will not distinguish  E.coli from Shigella species including S.dysenteriae, S.flexneri,  S.boydii, and S.sonnei. Specimens containing Shigella species or  E.coli will be reported as Positive for E.coli.    Specimen tested with Verigene multiplex, gram-negative blood culture  nucleic acid test for the following targets: Acinetobacter sp.,  Citrobacter sp., Enterobacter sp., Proteus sp., E. coli, K.  pneumoniae/oxytoca, P. aeruginosa, and the following resistance  markers: CTXM, KPC, NDM, VIM, IMP and OXA.    Critical Value/Significant Value called to and read back by  Flower Lujan Rn @ 0389 8.27.19 CS     Moderate growth  Staphylococcus aureus  * 50,000 to 100,000 colonies/mL  mixed urogenital louann  Susceptibility testing not routinely done            All cultures:  Recent Labs   Lab 01/10/25  1414   CULTURE <10,000 CFU/mL Urogenital louann      Blood culture:  Results for orders placed or performed during the hospital encounter of 01/06/25   Blood Culture Arm, Left    Collection Time: 01/07/25 12:03 AM    Specimen: Arm, Left; Blood   Result Value Ref Range    Culture No Growth    Blood Culture Peripheral Blood    Collection Time: 01/06/25 11:06 PM    Specimen: Peripheral Blood   Result Value Ref Range    Culture No Growth    Results for orders placed or performed during the hospital encounter of 01/03/25   Blood Culture Arm, Left    Collection Time: 01/03/25  6:19 PM    Specimen: Arm, Left; Blood   Result Value Ref Range    Culture No Growth    Blood Culture Peripheral Blood    Collection Time: 01/03/25  5:33 PM    Specimen: Peripheral Blood   Result Value Ref Range    Culture No Growth    Results for orders placed or performed during the hospital encounter of  12/10/24   Blood Culture Hand, Right    Collection Time: 12/10/24  7:19 PM    Specimen: Hand, Right; Blood   Result Value Ref Range    Culture No Growth    Blood Culture Peripheral Blood    Collection Time: 12/10/24  6:07 PM    Specimen: Peripheral Blood   Result Value Ref Range    Culture No Growth    Results for orders placed or performed during the hospital encounter of 10/11/24   Blood Culture Peripheral Blood    Collection Time: 10/11/24 10:59 PM    Specimen: Peripheral Blood   Result Value Ref Range    Culture No Growth    Results for orders placed or performed in visit on 09/30/24   Blood Culture Peripheral Blood    Collection Time: 09/30/24  3:45 PM    Specimen: Peripheral Blood   Result Value Ref Range    Culture No Growth    Results for orders placed or performed during the hospital encounter of 09/22/24   Blood Culture Peripheral Blood    Collection Time: 09/22/24  1:49 AM    Specimen: Peripheral Blood   Result Value Ref Range    Culture Positive on the 1st day of incubation (A)     Culture Klebsiella oxytoca ESBL (AA)     Culture Pseudomonas aeruginosa (AA)        Susceptibility    Klebsiella oxytoca ESBL - MARYJANE*     Ampicillin*  Resistant       * Intrinsically Resistant     Piperacillin/Tazobactam 8 Susceptible ug/mL     Ceftazidime  Resistant      Ceftriaxone  Resistant      Cefepime  Resistant      Meropenem* <=0.25 Susceptible ug/mL      * Enterobacterales that are susceptible to meropenem are usually susceptible to ertapenem.     Gentamicin <=1 Susceptible ug/mL     Tobramycin >=16 Resistant ug/mL     Ciprofloxacin 0.5 Intermediate ug/mL     Levofloxacin 0.25 Susceptible ug/mL     Trimethoprim/Sulfamethoxazole >16/304 Resistant ug/mL     * Additional susceptibilities in progress.    ESBL (extended spectrum beta lactamase) producing organisms require contact precautions.    Pseudomonas aeruginosa - MARYJANE*     Piperacillin/Tazobactam 16 Susceptible ug/mL     Ceftazidime 8 Susceptible ug/mL     Cefepime 8  Susceptible ug/mL     Meropenem >32 Resistant ug/mL     Tobramycin <=1 Susceptible ug/mL     Ciprofloxacin 0.25 Susceptible ug/mL     Levofloxacin 4 Resistant ug/mL     *       Results for orders placed or performed during the hospital encounter of 08/26/19   Blood culture    Collection Time: 08/30/19  6:57 AM    Specimen: Blood    Left Arm   Result Value Ref Range    Specimen Description Blood Left Arm     Special Requests Received in aerobic bottle only     Culture Micro No growth    Blood culture    Collection Time: 08/29/19  5:44 AM    Specimen: Blood    Left Arm   Result Value Ref Range    Specimen Description Blood Left Arm     Special Requests Received in aerobic bottle only     Culture Micro No growth    Blood culture    Collection Time: 08/28/19  5:18 PM    Specimen: Blood    Left Hand   Result Value Ref Range    Specimen Description Blood Left Hand     Special Requests Received in aerobic bottle only     Culture Micro No growth    Blood culture    Collection Time: 08/28/19  5:10 PM    Specimen: Blood    Right Hand   Result Value Ref Range    Specimen Description Blood Right Hand     Special Requests Received in aerobic bottle only     Culture Micro No growth    Blood culture    Collection Time: 08/27/19  1:50 AM    Specimen: Arm, Right; Blood    Right Arm   Result Value Ref Range    Specimen Description Blood Right Arm     Special Requests Received in aerobic bottle only     Culture Micro (A)      Cultured on the 1st day of incubation:  Escherichia coli  Susceptibility testing done on previous specimen      Culture Micro       Critical Value/Significant Value, preliminary result only, called to and read back by   Maria Teresa Martines RN 08/27/2019 @1425 /hdp     Blood culture    Collection Time: 08/26/19 11:31 PM    Specimen: Arm, Right; Blood    Right Arm   Result Value Ref Range    Specimen Description Blood Right Arm     Special Requests Received in aerobic bottle only     Culture Micro (A)      Cultured on  the 1st day of incubation:  Escherichia coli      Culture Micro       Critical Value/Significant Value, preliminary result only, called to and read back by  NENO Robert at 1913 [id number]      Culture Micro       (Note)  POSITIVE for E.COLI by Verigene multiplex nucleic acid test. Final  identification and antimicrobial susceptibility testing will be  verified by standard methods. Verigene test will not distinguish  E.coli from Shigella species including S.dysenteriae, S.flexneri,  S.boydii, and S.sonnei. Specimens containing Shigella species or  E.coli will be reported as Positive for E.coli.    Specimen tested with Verigene multiplex, gram-negative blood culture  nucleic acid test for the following targets: Acinetobacter sp.,  Citrobacter sp., Enterobacter sp., Proteus sp., E. coli, K.  pneumoniae/oxytoca, P. aeruginosa, and the following resistance  markers: CTXM, KPC, NDM, VIM, IMP and OXA.    Critical Value/Significant Value called to and read back by  Flower Lujan Rn @ 1267 8.27.19 CS           Susceptibility    Escherichia coli - MARYJANE     Amikacin <=2 Susceptible ug/mL     Ampicillin 16 Intermediate ug/mL     Ampicillin/Sulbactam 4 Susceptible ug/mL     Cefepime <=1 Susceptible ug/mL     Ceftazidime <=1 Susceptible ug/mL     Ceftriaxone <=1 Susceptible ug/mL     Ciprofloxacin <=0.25 Susceptible ug/mL     Gentamicin <=1 Susceptible ug/mL     Levofloxacin <=0.12 Susceptible ug/mL     Piperacillin/Tazo <=4 Susceptible ug/mL     Tobramycin <=1 Susceptible ug/mL     Trimethoprim/Sulfamethoxazole <=1/19 Susceptible ug/mL     Meropenem <=0.25 Susceptible ug/mL     *Note: Due to a large number of results and/or encounters for the requested time period, some results have not been displayed. A complete set of results can be found in Results Review.      Urine culture:  Results for orders placed or performed during the hospital encounter of 01/06/25   Urine Culture    Collection Time: 01/10/25  2:14 PM     Specimen: Urine, Midstream   Result Value Ref Range    Culture <10,000 CFU/mL Urogenital louann    Results for orders placed or performed during the hospital encounter of 12/10/24   Urine Culture    Collection Time: 12/10/24  5:39 PM    Specimen: Urine, Catheter   Result Value Ref Range    Culture >100,000 CFU/mL Klebsiella pneumoniae (A)        Susceptibility    Klebsiella pneumoniae - MARYJANE     Ampicillin*  Resistant       * Intrinsically Resistant     Ampicillin/ Sulbactam 8 Susceptible ug/mL     Piperacillin/Tazobactam <=4 Susceptible ug/mL     Cefazolin 2 Susceptible ug/mL     Ceftazidime <=0.5 Susceptible ug/mL     Ceftriaxone <=0.25 Susceptible ug/mL     Cefepime <=0.12 Susceptible ug/mL     Gentamicin <=1 Susceptible ug/mL     Ciprofloxacin <=0.06 Susceptible ug/mL     Levofloxacin <=0.12 Susceptible ug/mL     Nitrofurantoin 64 Intermediate ug/mL     Trimethoprim/Sulfamethoxazole <=1/19 Susceptible ug/mL   Results for orders placed or performed in visit on 11/06/24   Urine Culture Aerobic Bacterial [[id number]]    Collection Time: 11/06/24 10:48 AM    Specimen: Urine, Straight Catheter   Result Value Ref Range    Culture No Growth    Results for orders placed or performed during the hospital encounter of 10/11/24   Urine Culture    Collection Time: 10/11/24 10:00 PM    Specimen: Urine, Clean Catch   Result Value Ref Range    Culture 50,000-100,000 CFU/mL Mixture of Urogenital Louann    Results for orders placed or performed during the hospital encounter of 09/22/24   Urine Culture    Collection Time: 09/22/24  2:33 AM    Specimen: Urine, NOS   Result Value Ref Range    Culture >100,000 CFU/mL Klebsiella oxytoca ESBL (A)        Susceptibility    Klebsiella oxytoca ESBL - MARYJANE*     Ampicillin*  Resistant       * Intrinsically Resistant     Piperacillin/Tazobactam 32 Resistant ug/mL     Cefazolin* >=64 Resistant ug/mL      * Cefazolin MARYJANE breakpoints are for the treatment of uncomplicated urinary tract infections. For  the treatment of systemic infections, please contact the laboratory for additional testing.     Cefoxitin <=4 Susceptible ug/mL     Ceftazidime  Resistant      Ceftriaxone  Resistant      Cefepime  Resistant      Meropenem* <=0.25 Susceptible ug/mL      * Enterobacterales that are susceptible to meropenem are usually susceptible to ertapenem.     Gentamicin <=1 Susceptible ug/mL     Tobramycin 8 Resistant ug/mL     Ciprofloxacin >=4 Resistant ug/mL     Levofloxacin 4 Resistant ug/mL     Nitrofurantoin 256 Resistant ug/mL     Trimethoprim/Sulfamethoxazole >16/304 Resistant ug/mL     * ESBL (extended spectrum beta lactamase) producing organisms require contact precautions.   Results for orders placed or performed in visit on 08/20/24   Urine Culture Aerobic Bacterial - lab collect    Collection Time: 08/20/24  2:53 PM    Specimen: Urine, Straight Catheter   Result Value Ref Range    Culture >100,000 CFU/mL Klebsiella oxytoca ESBL (A)        Susceptibility    Klebsiella oxytoca ESBL - MARYJANE*     Ampicillin*  Resistant       * Intrinsically Resistant     Piperacillin/Tazobactam <=4 Susceptible ug/mL     Cefazolin* >=64 Resistant ug/mL      * Cefazolin MARYJANE breakpoints are for the treatment of uncomplicated urinary tract infections. For the treatment of systemic infections, please contact the laboratory for additional testing.     Cefoxitin <=4 Susceptible ug/mL     Ceftazidime  Resistant      Ceftriaxone  Resistant      Cefepime  Resistant      Meropenem* <=0.25 Susceptible ug/mL      * Enterobacterales that are susceptible to meropenem are usually susceptible to ertapenem.     Gentamicin <=1 Susceptible ug/mL     Tobramycin >=16 Resistant ug/mL     Ciprofloxacin <=0.25 Susceptible ug/mL     Levofloxacin <=0.12 Susceptible ug/mL     Nitrofurantoin 32 Susceptible ug/mL     Trimethoprim/Sulfamethoxazole >16/304 Resistant ug/mL     * ESBL (extended spectrum beta lactamase) producing organisms require contact precautions.    Results for orders placed or performed during the hospital encounter of 08/26/19   Urine Culture Aerobic Bacterial    Collection Time: 08/27/19  2:24 AM    Specimen: Urine Midstream; Midstream Urine   Result Value Ref Range    Specimen Description Midstream Urine     Special Requests Specimen received in preservative     Culture Micro 10,000 to 50,000 colonies/mL  Escherichia coli   (A)        Susceptibility    Escherichia coli - MARYJANE     Ampicillin 16 Intermediate ug/mL     Cefazolin* <=4 Susceptible ug/mL      * Cefazolin MARYJANE breakpoints are for the treatment of uncomplicated urinary tract infections.  For the treatment of systemic infections, please contact the laboratory for additional testing.     Cefoxitin 16 Intermediate ug/mL     Ceftazidime <=1 Susceptible ug/mL     Ceftriaxone <=1 Susceptible ug/mL     Ciprofloxacin <=0.25 Susceptible ug/mL     Gentamicin <=1 Susceptible ug/mL     Levofloxacin <=0.12 Susceptible ug/mL     Nitrofurantoin <=16 Susceptible ug/mL     Tobramycin <=1 Susceptible ug/mL     Trimethoprim/Sulfamethoxazole <=1/19 Susceptible ug/mL     Ampicillin/Sulbactam 4 Susceptible ug/mL     Piperacillin/Tazo <=4 Susceptible ug/mL     Cefepime <=1 Susceptible ug/mL   Results for orders placed or performed in visit on 07/30/18   Urine Culture Aerobic Bacterial    Collection Time: 07/30/18  8:52 AM    Specimen: Midstream Urine   Result Value Ref Range    Specimen Description Midstream Urine     Culture Micro       50,000 to 100,000 colonies/mL  mixed urogenital louann  Susceptibility testing not routinely done     Results for orders placed or performed in visit on 07/18/16   Urine Culture Aerobic Bacterial    Collection Time: 07/18/16  3:03 PM    Specimen: Urine   Result Value Ref Range    Specimen Description Midstream Urine     Culture Micro (A)      50,000 to 100,000 colonies/mL Klebsiella pneumoniae    Micro Report Status FINAL 07/20/2016     Organism:       50,000 to 100,000 colonies/mL  Klebsiella pneumoniae       Susceptibility    50,000 to 100,000 colonies/ml klebsiella pneumoniae (val) -  (no method available)     Ampicillin >32.0 Resistant  ug/mL     Cefazolin Value in next row  ug/mL      <=4 SusceptibleCefazolin VAL breakpoints are for the treatment of uncomplicated urinary tract infections.  For the treatment of systemic infections, please contact the laboratory for additional testing.     Cefoxitin Value in next row  ug/mL      <=4 SusceptibleCefazolin VAL breakpoints are for the treatment of uncomplicated urinary tract infections.  For the treatment of systemic infections, please contact the laboratory for additional testing.     Ceftazidime Value in next row  ug/mL      <=4 SusceptibleCefazolin VAL breakpoints are for the treatment of uncomplicated urinary tract infections.  For the treatment of systemic infections, please contact the laboratory for additional testing.     Ceftriaxone Value in next row  ug/mL      <=4 SusceptibleCefazolin VAL breakpoints are for the treatment of uncomplicated urinary tract infections.  For the treatment of systemic infections, please contact the laboratory for additional testing.     Ciprofloxacin Value in next row  ug/mL      <=4 SusceptibleCefazolin VAL breakpoints are for the treatment of uncomplicated urinary tract infections.  For the treatment of systemic infections, please contact the laboratory for additional testing.     Gentamicin Value in next row  ug/mL      <=4 SusceptibleCefazolin VAL breakpoints are for the treatment of uncomplicated urinary tract infections.  For the treatment of systemic infections, please contact the laboratory for additional testing.     Levofloxacin Value in next row  ug/mL      <=4 SusceptibleCefazolin VAL breakpoints are for the treatment of uncomplicated urinary tract infections.  For the treatment of systemic infections, please contact the laboratory for additional testing.     Nitrofurantoin Value in next row   ug/mL      <=4 SusceptibleCefazolin MARYJANE breakpoints are for the treatment of uncomplicated urinary tract infections.  For the treatment of systemic infections, please contact the laboratory for additional testing.     Tobramycin Value in next row  ug/mL      <=4 SusceptibleCefazolin MARYJANE breakpoints are for the treatment of uncomplicated urinary tract infections.  For the treatment of systemic infections, please contact the laboratory for additional testing.     Trimethoprim/Sulfamethoxazole Value in next row  ug/mL      <=4 SusceptibleCefazolin MARYJANE breakpoints are for the treatment of uncomplicated urinary tract infections.  For the treatment of systemic infections, please contact the laboratory for additional testing.     Ampicillin/Sulbactam Value in next row  ug/mL      <=4 SusceptibleCefazolin MARYJANE breakpoints are for the treatment of uncomplicated urinary tract infections.  For the treatment of systemic infections, please contact the laboratory for additional testing.     Piperacillin/Tazo Value in next row  ug/mL      <=4 SusceptibleCefazolin MARYJANE breakpoints are for the treatment of uncomplicated urinary tract infections.  For the treatment of systemic infections, please contact the laboratory for additional testing.     Cefepime Value in next row  ug/mL      <=4 SusceptibleCefazolin MARYJANE breakpoints are for the treatment of uncomplicated urinary tract infections.  For the treatment of systemic infections, please contact the laboratory for additional testing.     *Note: Due to a large number of results and/or encounters for the requested time period, some results have not been displayed. A complete set of results can be found in Results Review.

## 2025-01-15 ENCOUNTER — MYC MEDICAL ADVICE (OUTPATIENT)
Dept: INTERNAL MEDICINE | Facility: CLINIC | Age: 75
End: 2025-01-15
Payer: MEDICARE

## 2025-01-15 ENCOUNTER — TELEPHONE (OUTPATIENT)
Dept: INTERNAL MEDICINE | Facility: CLINIC | Age: 75
End: 2025-01-15
Payer: MEDICARE

## 2025-01-15 ENCOUNTER — MYC MEDICAL ADVICE (OUTPATIENT)
Dept: INFECTIOUS DISEASES | Facility: CLINIC | Age: 75
End: 2025-01-15
Payer: MEDICARE

## 2025-01-15 LAB
H CAPSUL AG UR QL IA: NOT DETECTED
H CAPSUL AG UR-MCNC: NOT DETECTED NG/ML

## 2025-01-15 NOTE — PLAN OF CARE
Physical Therapy Discharge Summary    Reason for therapy discharge:    Discharged to home with home therapy.    Progress towards therapy goal(s). See goals on Care Plan in [facility] electronic health record for goal details.  Goals not met.  Barriers to achieving goals:   discharge from facility.    Therapy recommendation(s):    Continued therapy is recommended.  Rationale/Recommendations:  Recommending HHPT to progress strength and IND mobility.

## 2025-01-15 NOTE — TELEPHONE ENCOUNTER
MTM referral from: Transitions of Care (recent hospital discharge, TCU discharge, or ED visit)    MTM referral outreach attempt #1 on January 15, 2025 at 10:33 AM      Outcome: Spoke with patient she is not interested     Use vbc for the carrier/Plan on the flowsheet      Jemima Muller CMA  MTM

## 2025-01-16 ENCOUNTER — TELEPHONE (OUTPATIENT)
Dept: ANTICOAGULATION | Facility: CLINIC | Age: 75
End: 2025-01-16
Payer: MEDICARE

## 2025-01-16 DIAGNOSIS — I48.0 PAF (PAROXYSMAL ATRIAL FIBRILLATION) (H): Primary | ICD-10-CM

## 2025-01-16 LAB
ASPERGILLUS AB TITR SER CF: NORMAL {TITER}
B DERMAT AB SER-ACNC: 0.6 IV
COCCIDIOIDES AB TITR SER CF: NORMAL {TITER}
H CAPSUL MYC AB TITR SER CF: NORMAL {TITER}
H CAPSUL YST AB TITR SER CF: NORMAL {TITER}
SCANNED LAB RESULT: NORMAL
SCANNED LAB RESULT: NORMAL

## 2025-01-16 NOTE — TELEPHONE ENCOUNTER
ANTICOAGULATION DIRECT ORAL ANTICOAGULANT MONITORING    SUBJECTIVE     [facility] is evaluating Luz Thompson's Apixaban (Eliquis) as part of its Anticoagulation Monitoring Program.    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  Apixaban dose was decreased from 5 mg twice daily to 2.5 mg twice daily in 2017 due to drug-to-drug interaction while taking voriconazole, which is no longer listed on current medication list.    OBJECTIVE     Age: 74 year old    Wt Readings from Last 4 Encounters:   01/10/25 86.5 kg (190 lb 11.2 oz)   01/04/25 83 kg (183 lb)   01/02/25 79.4 kg (175 lb)   12/10/24 79.4 kg (175 lb)          Lab Results   Component Value Date    CR 1.44 (H) 01/13/2025    CR 1.41 (H) 01/12/2025    CR 1.47 (H) 01/11/2025     Creatinine Clearance (using actual bodyweight, mL/min):     Lab Results   Component Value Date    HGB 8.9 01/13/2025    HGB 11.2 09/18/2020     01/13/2025     09/18/2020     ASSESSMENT/PLAN     A chart review for Direct Oral Anticoagulant (DOAC) Stewardship has been completed for:     Dosing: recommend adjustment to Apixaban 5 mg TWICE daily for NOT meeting 2 of 3 criteria for dose adjustment. Per the 2023 ACC/AHA Atrial Fibrillation guidelines, non-evidence based doses of DOACs should be avoided to minimize risks of preventable thromboembolism or major bleeding and to improve survival (Circulation. 2024;149:e1-e156.)  (consistent with package insert dosing)    Plan made per ACC anticoagulation protocol    Taniya Cavanaugh RN  Anticoagulation Clinic

## 2025-01-16 NOTE — TELEPHONE ENCOUNTER
If she is having significant symptoms/concerns, we can schedule her with Dr. Mayorga next week.     Thanks,     Clementine

## 2025-01-16 NOTE — TELEPHONE ENCOUNTER
Called patient and she states she is immunosuppressed. Her normal temp is 97.5.   Patient has been hospitalized twice since 1/3/2025.    She is looking to schedule follow up with Dr Hidalgo who is going on vacation until 2/29/2025 and is not available.    Offered to schedule with acute provider and patient agreed. Patient could not find her calendar to schedule with us so will call back.  Upon callback please schedule follow up with acute provider as soon as possible because she is still experiencing fever and chills.    Patient get real teary during conversation who says she has been in ER 8 times since September of 2024. She said she she is just tired and wants some answers.    While talking patient agreed to schedule appointment with Katherine for 1/21/2025 at 210pm arrival

## 2025-01-16 NOTE — TELEPHONE ENCOUNTER
"Received patient MyChart message reporting dissatisfaction with her treatment while inpatient at [facility]. Patient was seen by Dr. Johnson in Infectious Disease, and his 01/13 note states  \"patient expresses desire for University to weigh in on the case, probably okay for discharge and follow-up outpatient at the  following temperatures as an outpatient \"    Patient is currently scheduled for follow up with Dr. Lundberg on 02/12.     Called patient and discussed her concerns. She reports she scheduled an appointment with her PCP on 01/23 and she is hoping our team can collaborate with her PCP on next steps.     She says the hospital ([facility]) has not helped her. She states she was admitted for 3 days, home less than 24 hours, and was admitted again from 01/06-01/14. She expressed frustration with the care she received from Dr. Johnson, and explained that her baseline temperature runs at 97.5 and when she has a temperature of 99.5 it is a fever for her. She would like to follow up with our ID team.     Due to Dr. Lundberg being out until February, will route to covering provider to request recommendation for follow up.       "

## 2025-01-19 ENCOUNTER — APPOINTMENT (OUTPATIENT)
Dept: GENERAL RADIOLOGY | Facility: CLINIC | Age: 75
End: 2025-01-19
Attending: EMERGENCY MEDICINE
Payer: MEDICARE

## 2025-01-19 ENCOUNTER — DOCUMENTATION ONLY (OUTPATIENT)
Dept: INFECTIOUS DISEASES | Facility: CLINIC | Age: 75
End: 2025-01-19
Payer: MEDICARE

## 2025-01-19 ENCOUNTER — HOSPITAL ENCOUNTER (INPATIENT)
Facility: CLINIC | Age: 75
LOS: 4 days | Discharge: HOME | End: 2025-01-23
Attending: EMERGENCY MEDICINE | Admitting: INTERNAL MEDICINE
Payer: MEDICARE

## 2025-01-19 DIAGNOSIS — R09.02 HYPOXIA: Primary | ICD-10-CM

## 2025-01-19 DIAGNOSIS — E55.9 VITAMIN D DEFICIENCY: ICD-10-CM

## 2025-01-19 DIAGNOSIS — R53.1 WEAKNESS: ICD-10-CM

## 2025-01-19 DIAGNOSIS — R06.02 SHORTNESS OF BREATH: ICD-10-CM

## 2025-01-19 DIAGNOSIS — I13.10 HYPERTENSIVE HEART AND RENAL DISEASE WITH RENAL FAILURE, STAGE 1 THROUGH STAGE 4 OR UNSPECIFIED CHRONIC KIDNEY DISEASE, WITHOUT HEART FAILURE: ICD-10-CM

## 2025-01-19 DIAGNOSIS — M81.0 AGE-RELATED OSTEOPOROSIS WITHOUT CURRENT PATHOLOGICAL FRACTURE: ICD-10-CM

## 2025-01-19 DIAGNOSIS — A41.9 SEPSIS WITHOUT ACUTE ORGAN DYSFUNCTION, DUE TO UNSPECIFIED ORGANISM (H): ICD-10-CM

## 2025-01-19 DIAGNOSIS — R53.81 PHYSICAL DECONDITIONING: ICD-10-CM

## 2025-01-19 DIAGNOSIS — N18.30 DIABETES MELLITUS WITH STAGE 3 CHRONIC KIDNEY DISEASE (H): ICD-10-CM

## 2025-01-19 DIAGNOSIS — E78.5 HYPERLIPIDEMIA LDL GOAL <70: ICD-10-CM

## 2025-01-19 DIAGNOSIS — N18.32 STAGE 3B CHRONIC KIDNEY DISEASE (H): ICD-10-CM

## 2025-01-19 DIAGNOSIS — N39.0 URINARY TRACT INFECTION: ICD-10-CM

## 2025-01-19 DIAGNOSIS — E11.22 TYPE 2 DIABETES MELLITUS WITH STAGE 3B CHRONIC KIDNEY DISEASE, WITHOUT LONG-TERM CURRENT USE OF INSULIN (H): ICD-10-CM

## 2025-01-19 DIAGNOSIS — Z94.4 H/O LIVER TRANSPLANT (H): ICD-10-CM

## 2025-01-19 DIAGNOSIS — Z22.358 ESBL ESCHERICHIA COLI CARRIER: ICD-10-CM

## 2025-01-19 DIAGNOSIS — J18.9 PNEUMONIA DUE TO INFECTIOUS ORGANISM, UNSPECIFIED LATERALITY, UNSPECIFIED PART OF LUNG: ICD-10-CM

## 2025-01-19 DIAGNOSIS — Z87.891 FORMER SMOKER: ICD-10-CM

## 2025-01-19 DIAGNOSIS — N18.9 CHRONIC KIDNEY DISEASE, UNSPECIFIED CKD STAGE: ICD-10-CM

## 2025-01-19 DIAGNOSIS — N30.00 ACUTE CYSTITIS WITHOUT HEMATURIA: ICD-10-CM

## 2025-01-19 DIAGNOSIS — D84.9 IMMUNOSUPPRESSED STATUS: ICD-10-CM

## 2025-01-19 DIAGNOSIS — N17.9 AKI (ACUTE KIDNEY INJURY): ICD-10-CM

## 2025-01-19 DIAGNOSIS — Z79.69 LONG TERM (CURRENT) USE OF OTHER IMMUNOMODULATORS AND IMMUNOSUPPRESSANTS: ICD-10-CM

## 2025-01-19 DIAGNOSIS — N10 PYELONEPHRITIS, ACUTE: ICD-10-CM

## 2025-01-19 DIAGNOSIS — E11.22 DIABETES MELLITUS WITH STAGE 3 CHRONIC KIDNEY DISEASE (H): ICD-10-CM

## 2025-01-19 DIAGNOSIS — N18.32 TYPE 2 DIABETES MELLITUS WITH STAGE 3B CHRONIC KIDNEY DISEASE, WITHOUT LONG-TERM CURRENT USE OF INSULIN (H): ICD-10-CM

## 2025-01-19 DIAGNOSIS — J18.9 PNEUMONIA: ICD-10-CM

## 2025-01-19 PROBLEM — R50.9 FEVER: Status: ACTIVE | Noted: 2025-01-19

## 2025-01-19 LAB
ALBUMIN SERPL BCG-MCNC: 3.4 G/DL (ref 3.5–5.2)
ALBUMIN UR-MCNC: 20 MG/DL
ALP SERPL-CCNC: 113 U/L (ref 40–150)
ALT SERPL W P-5'-P-CCNC: 29 U/L (ref 0–50)
ANION GAP SERPL CALCULATED.3IONS-SCNC: 13 MMOL/L (ref 7–15)
APPEARANCE UR: CLEAR
AST SERPL W P-5'-P-CCNC: 32 U/L (ref 0–45)
BASOPHILS # BLD AUTO: 0.1 10E3/UL (ref 0–0.2)
BASOPHILS NFR BLD AUTO: 1 %
BILIRUB SERPL-MCNC: 0.4 MG/DL
BILIRUB UR QL STRIP: NEGATIVE
BUN SERPL-MCNC: 33.7 MG/DL (ref 8–23)
CALCIUM SERPL-MCNC: 8.9 MG/DL (ref 8.8–10.4)
CHLORIDE SERPL-SCNC: 102 MMOL/L (ref 98–107)
COLOR UR AUTO: ABNORMAL
CREAT SERPL-MCNC: 1.8 MG/DL (ref 0.51–0.95)
EGFRCR SERPLBLD CKD-EPI 2021: 29 ML/MIN/1.73M2
EOSINOPHIL # BLD AUTO: 0.1 10E3/UL (ref 0–0.7)
EOSINOPHIL NFR BLD AUTO: 1 %
ERYTHROCYTE [DISTWIDTH] IN BLOOD BY AUTOMATED COUNT: 15.4 % (ref 10–15)
FLUAV RNA SPEC QL NAA+PROBE: NEGATIVE
FLUBV RNA RESP QL NAA+PROBE: NEGATIVE
GLUCOSE BLDC GLUCOMTR-MCNC: 158 MG/DL (ref 70–99)
GLUCOSE SERPL-MCNC: 114 MG/DL (ref 70–99)
GLUCOSE UR STRIP-MCNC: NEGATIVE MG/DL
HCO3 SERPL-SCNC: 24 MMOL/L (ref 22–29)
HCT VFR BLD AUTO: 32.2 % (ref 35–47)
HGB BLD-MCNC: 10.3 G/DL (ref 11.7–15.7)
HGB UR QL STRIP: NEGATIVE
HOLD SPECIMEN: NORMAL
IMM GRANULOCYTES # BLD: 0.3 10E3/UL
IMM GRANULOCYTES NFR BLD: 3 %
KETONES UR STRIP-MCNC: NEGATIVE MG/DL
LACTATE SERPL-SCNC: 1 MMOL/L (ref 0.7–2)
LEUKOCYTE ESTERASE UR QL STRIP: ABNORMAL
LYMPHOCYTES # BLD AUTO: 1 10E3/UL (ref 0.8–5.3)
LYMPHOCYTES NFR BLD AUTO: 10 %
MCH RBC QN AUTO: 28.1 PG (ref 26.5–33)
MCHC RBC AUTO-ENTMCNC: 32 G/DL (ref 31.5–36.5)
MCV RBC AUTO: 88 FL (ref 78–100)
MONOCYTES # BLD AUTO: 0.9 10E3/UL (ref 0–1.3)
MONOCYTES NFR BLD AUTO: 9 %
MUCOUS THREADS #/AREA URNS LPF: PRESENT /LPF
NEUTROPHILS # BLD AUTO: 7.8 10E3/UL (ref 1.6–8.3)
NEUTROPHILS NFR BLD AUTO: 77 %
NITRATE UR QL: NEGATIVE
NRBC # BLD AUTO: 0 10E3/UL
NRBC BLD AUTO-RTO: 0 /100
PH UR STRIP: 6 [PH] (ref 5–7)
PLATELET # BLD AUTO: 397 10E3/UL (ref 150–450)
POTASSIUM SERPL-SCNC: 3.9 MMOL/L (ref 3.4–5.3)
PROCALCITONIN SERPL IA-MCNC: 0.09 NG/ML
PROT SERPL-MCNC: 6.4 G/DL (ref 6.4–8.3)
RBC # BLD AUTO: 3.66 10E6/UL (ref 3.8–5.2)
RBC URINE: <1 /HPF
RSV RNA SPEC NAA+PROBE: NEGATIVE
SARS-COV-2 RNA RESP QL NAA+PROBE: NEGATIVE
SODIUM SERPL-SCNC: 139 MMOL/L (ref 135–145)
SP GR UR STRIP: 1.01 (ref 1–1.03)
SQUAMOUS EPITHELIAL: 2 /HPF
TROPONIN T SERPL HS-MCNC: 32 NG/L
TROPONIN T SERPL HS-MCNC: 39 NG/L
UROBILINOGEN UR STRIP-MCNC: NORMAL MG/DL
WBC # BLD AUTO: 10.2 10E3/UL (ref 4–11)
WBC URINE: 7 /HPF

## 2025-01-19 PROCEDURE — 81001 URINALYSIS AUTO W/SCOPE: CPT | Performed by: EMERGENCY MEDICINE

## 2025-01-19 PROCEDURE — 83605 ASSAY OF LACTIC ACID: CPT | Performed by: EMERGENCY MEDICINE

## 2025-01-19 PROCEDURE — 99285 EMERGENCY DEPT VISIT HI MDM: CPT | Mod: 25 | Performed by: EMERGENCY MEDICINE

## 2025-01-19 PROCEDURE — 250N000013 HC RX MED GY IP 250 OP 250 PS 637

## 2025-01-19 PROCEDURE — 80053 COMPREHEN METABOLIC PANEL: CPT | Performed by: EMERGENCY MEDICINE

## 2025-01-19 PROCEDURE — 71046 X-RAY EXAM CHEST 2 VIEWS: CPT | Mod: 26 | Performed by: RADIOLOGY

## 2025-01-19 PROCEDURE — 87149 DNA/RNA DIRECT PROBE: CPT | Performed by: EMERGENCY MEDICINE

## 2025-01-19 PROCEDURE — 85025 COMPLETE CBC W/AUTO DIFF WBC: CPT | Performed by: EMERGENCY MEDICINE

## 2025-01-19 PROCEDURE — 99285 EMERGENCY DEPT VISIT HI MDM: CPT | Performed by: EMERGENCY MEDICINE

## 2025-01-19 PROCEDURE — 71046 X-RAY EXAM CHEST 2 VIEWS: CPT

## 2025-01-19 PROCEDURE — 84484 ASSAY OF TROPONIN QUANT: CPT | Performed by: EMERGENCY MEDICINE

## 2025-01-19 PROCEDURE — 84145 PROCALCITONIN (PCT): CPT | Performed by: EMERGENCY MEDICINE

## 2025-01-19 PROCEDURE — 87086 URINE CULTURE/COLONY COUNT: CPT | Performed by: EMERGENCY MEDICINE

## 2025-01-19 PROCEDURE — 87040 BLOOD CULTURE FOR BACTERIA: CPT | Performed by: EMERGENCY MEDICINE

## 2025-01-19 PROCEDURE — 36415 COLL VENOUS BLD VENIPUNCTURE: CPT | Performed by: EMERGENCY MEDICINE

## 2025-01-19 PROCEDURE — G0378 HOSPITAL OBSERVATION PER HR: HCPCS

## 2025-01-19 PROCEDURE — 258N000003 HC RX IP 258 OP 636: Performed by: EMERGENCY MEDICINE

## 2025-01-19 PROCEDURE — 120N000002 HC R&B MED SURG/OB UMMC

## 2025-01-19 PROCEDURE — 87637 SARSCOV2&INF A&B&RSV AMP PRB: CPT | Performed by: EMERGENCY MEDICINE

## 2025-01-19 PROCEDURE — 99207 PR NO BILLABLE SERVICE THIS VISIT: CPT | Mod: FS | Performed by: INTERNAL MEDICINE

## 2025-01-19 PROCEDURE — 99223 1ST HOSP IP/OBS HIGH 75: CPT | Mod: FS

## 2025-01-19 RX ORDER — AMOXICILLIN 250 MG
2 CAPSULE ORAL 2 TIMES DAILY PRN
Status: DISCONTINUED | OUTPATIENT
Start: 2025-01-19 | End: 2025-01-23 | Stop reason: HOSPADM

## 2025-01-19 RX ORDER — EZETIMIBE 10 MG/1
10 TABLET ORAL DAILY
Status: DISCONTINUED | OUTPATIENT
Start: 2025-01-20 | End: 2025-01-23 | Stop reason: HOSPADM

## 2025-01-19 RX ORDER — GABAPENTIN 250 MG/5ML
300 SOLUTION ORAL AT BEDTIME
Status: DISCONTINUED | OUTPATIENT
Start: 2025-01-19 | End: 2025-01-23 | Stop reason: HOSPADM

## 2025-01-19 RX ORDER — URSODIOL 250 MG/1
250 TABLET, FILM COATED ORAL 2 TIMES DAILY
Status: DISCONTINUED | OUTPATIENT
Start: 2025-01-19 | End: 2025-01-23 | Stop reason: HOSPADM

## 2025-01-19 RX ORDER — SIROLIMUS 1 MG/1
1 TABLET, FILM COATED ORAL DAILY
Status: DISCONTINUED | OUTPATIENT
Start: 2025-01-20 | End: 2025-01-23 | Stop reason: HOSPADM

## 2025-01-19 RX ORDER — AMLODIPINE BESYLATE 2.5 MG/1
2.5 TABLET ORAL DAILY
Status: DISCONTINUED | OUTPATIENT
Start: 2025-01-20 | End: 2025-01-21

## 2025-01-19 RX ORDER — SODIUM CHLORIDE 9 MG/ML
INJECTION, SOLUTION INTRAVENOUS CONTINUOUS
Status: ACTIVE | OUTPATIENT
Start: 2025-01-19 | End: 2025-01-20

## 2025-01-19 RX ORDER — LIDOCAINE 40 MG/G
CREAM TOPICAL
Status: DISCONTINUED | OUTPATIENT
Start: 2025-01-19 | End: 2025-01-23 | Stop reason: HOSPADM

## 2025-01-19 RX ORDER — ACETAMINOPHEN 325 MG/1
650 TABLET ORAL EVERY 4 HOURS PRN
Status: DISCONTINUED | OUTPATIENT
Start: 2025-01-19 | End: 2025-01-23 | Stop reason: HOSPADM

## 2025-01-19 RX ORDER — NICOTINE POLACRILEX 4 MG
15-30 LOZENGE BUCCAL
Status: DISCONTINUED | OUTPATIENT
Start: 2025-01-19 | End: 2025-01-23 | Stop reason: HOSPADM

## 2025-01-19 RX ORDER — AMOXICILLIN 250 MG
1 CAPSULE ORAL 2 TIMES DAILY PRN
Status: DISCONTINUED | OUTPATIENT
Start: 2025-01-19 | End: 2025-01-23 | Stop reason: HOSPADM

## 2025-01-19 RX ORDER — CALCITRIOL 0.25 UG/1
0.25 CAPSULE, LIQUID FILLED ORAL DAILY
Status: DISCONTINUED | OUTPATIENT
Start: 2025-01-20 | End: 2025-01-23 | Stop reason: HOSPADM

## 2025-01-19 RX ORDER — GUAR GUM
1 PACKET (EA) ORAL DAILY
Status: DISCONTINUED | OUTPATIENT
Start: 2025-01-20 | End: 2025-01-23 | Stop reason: HOSPADM

## 2025-01-19 RX ORDER — ACETAMINOPHEN 650 MG/1
650 SUPPOSITORY RECTAL EVERY 4 HOURS PRN
Status: DISCONTINUED | OUTPATIENT
Start: 2025-01-19 | End: 2025-01-23 | Stop reason: HOSPADM

## 2025-01-19 RX ORDER — VIT C/E/ZN/COPPR/LUTEIN/ZEAXAN 60 MG-6 MG
1 CAPSULE ORAL 2 TIMES DAILY
Status: DISCONTINUED | OUTPATIENT
Start: 2025-01-20 | End: 2025-01-23 | Stop reason: HOSPADM

## 2025-01-19 RX ORDER — CALCIUM CARBONATE 500 MG/1
1000 TABLET, CHEWABLE ORAL 4 TIMES DAILY PRN
Status: DISCONTINUED | OUTPATIENT
Start: 2025-01-19 | End: 2025-01-23 | Stop reason: HOSPADM

## 2025-01-19 RX ORDER — DEXTROSE MONOHYDRATE 25 G/50ML
25-50 INJECTION, SOLUTION INTRAVENOUS
Status: DISCONTINUED | OUTPATIENT
Start: 2025-01-19 | End: 2025-01-23 | Stop reason: HOSPADM

## 2025-01-19 RX ORDER — LEVOTHYROXINE SODIUM 175 UG/1
175 TABLET ORAL
Status: DISCONTINUED | OUTPATIENT
Start: 2025-01-20 | End: 2025-01-23 | Stop reason: HOSPADM

## 2025-01-19 RX ORDER — PREDNISONE 5 MG/1
10 TABLET ORAL DAILY
Status: DISCONTINUED | OUTPATIENT
Start: 2025-01-20 | End: 2025-01-23 | Stop reason: HOSPADM

## 2025-01-19 RX ORDER — METOPROLOL SUCCINATE 25 MG/1
25 TABLET, EXTENDED RELEASE ORAL DAILY
Status: DISCONTINUED | OUTPATIENT
Start: 2025-01-20 | End: 2025-01-23 | Stop reason: HOSPADM

## 2025-01-19 RX ADMIN — ACETAMINOPHEN 650 MG: 325 TABLET, FILM COATED ORAL at 23:58

## 2025-01-19 RX ADMIN — SODIUM CHLORIDE 1000 ML: 9 INJECTION, SOLUTION INTRAVENOUS at 20:06

## 2025-01-19 RX ADMIN — APIXABAN 2.5 MG: 2.5 TABLET, FILM COATED ORAL at 23:58

## 2025-01-19 RX ADMIN — GABAPENTIN 300 MG: 250 SOLUTION ORAL at 23:59

## 2025-01-19 RX ADMIN — URSODIOL 250 MG: 250 TABLET, FILM COATED ORAL at 23:59

## 2025-01-19 RX ADMIN — Medication 1 MG: at 23:58

## 2025-01-19 ASSESSMENT — ACTIVITIES OF DAILY LIVING (ADL)
ADLS_ACUITY_SCORE: 62
ADLS_ACUITY_SCORE: 57
ADLS_ACUITY_SCORE: 62
ADLS_ACUITY_SCORE: 57
ADLS_ACUITY_SCORE: 62
ADLS_ACUITY_SCORE: 57
ADLS_ACUITY_SCORE: 57

## 2025-01-19 NOTE — PROGRESS NOTES
[facility] Gen ID brief note     Was paged by patient's daughter while on call for [facility] inpatient/curbside line today. Pt being followed by my Transplant ID colleague Dr Lundberg.      Per daughter: fevers x 1 week. Discharged Tuesday from [facility]. 6 recent admissions at [facility]. Was treated for a pneumonia. They report still had a fever on abx, all test were neg, was discharged with pending fungal workup. 101 fevers today, immunosuppressed. Hip also hurting to the point where she cannot get herself to bathroom. She is a diabetic and not eating as unable to go to the kitchen. Daughter is worried about safety. They do not want to go to [facility] given no TID expertise there.      Noted pending fungal workup so far neg. Nromal WBC, CRP and procal at [facility] 1/13.   I also noted clinic nurses have been trying to get her in for a Transplant ID visit with a CLAUDIO spot this week, but there may not be TID availability.      Advised to go into [facility] ER where there is Transplant ID expertise, to have hip looked at and given diabetic, and immunosuppressed in the setting of fevers to 101, impaired mobility and concern for safety. [place] is best given TID availability.      Sent an epic message to inform Dr Lundberg.     Sapphire Tao  Staff Physician  Division of Infectious Diseases

## 2025-01-19 NOTE — ED NOTES
Bed: ED28  Expected date:   Expected time:   Means of arrival:   Comments:  [place] 74M fever hypotension

## 2025-01-19 NOTE — ED PROVIDER NOTES
"      ED Provider Note  January 19, 2025  [facility]      History     Chief Complaint: Fever      HPI  74-year-old female with a significant past medical history, including primary biliary cirrhosis post-liver transplant (2002) on chronic immunosuppression, atrial fibrillation and DVT on anticoagulation, prior cerebrovascular accident (CVA), chronic kidney disease (CKD) stage III, and type 2 diabetes mellitus HLD, HTN and PAD returning due to concern for shortness of breath, hypoxia and fever.    Patient had a recent complicated hospitalization.  She had intermittent fevers, but no clear source.  Ultimately was determined the patient might have a pneumonia and she completed an antibiotic course, was afebrile for greater than 1 day prior to discharge.  Had been followed by infectious disease and was discharged without antibiotics at that time.    Now returning for worsening intermittent fevers at home max to about a 101.8 today.  Associated symptoms include generalized weakness and feeling overall unwell  Feels significant shortness of breath with exertion, dyspneic which has progressively been worsening since discharge.    No abdominal pain no vomiting no diarrhea.  No runny nose.  No new falls or trauma.  No dysuria or urgency.          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     Deep venous thrombosis     Diverticulosis of sigmoid colon 12/21/2013    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, history of     Received Rituxan during Summer of 2016    Glaucoma     H/O esophageal varices     Hearing loss     Hyperlipidemia " 04/10/2012    Says that she does not have it anymore, not on meds    Hypertension     Hypertriglyceridemia     Liver replaced by transplant (H) 10/17/2011    Dr. 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     Past Surgical History:   Procedure Laterality Date    APPENDECTOMY  1961    CATARACT IOL, RT/LT      RE12/19/2013, LE12/10/2013 - Toric lenses    CHOLECYSTECTOMY  1991    COLONOSCOPY  03/10/2014    Procedure: COLONOSCOPY;;  Surgeon: Gentry Ramirez MD;  Location: [facility] GI    CYSTOSCOPY      ear drum repair      ENDOBRONCHIAL ULTRASOUND FLEXIBLE N/A 09/29/2017    Procedure: ENDOBRONCHIAL ULTRASOUND FLEXIBLE;  Flexible Bronchoscopy, Endobronchial Ultrasound, Transbronchial Needle Aspiration ;  Surgeon: Eden Clinton MD;  Location: [facility] OR    ENDOSCOPIC RETROGRADE CHOLANGIOPANCREATOGRAM  09/19/2013    Procedure: ENDOSCOPIC RETROGRADE CHOLANGIOPANCREATOGRAM;  Endoscopic Retrograde Cholangiopancreatogram with single balloon enteroscopy, ballon sweep of bile duct;  Surgeon: Brett Membreno MD;  Location: [facility] OR     KNEE SCOPE,MED/LAT MENISECTOMY Right 08/10/2012    partial medial menisectomy only    KNEE SURGERY  1966    R knee    PICC INSERTION  09/18/2013    4fr SL PASV PICC, 40cm (1cm external) in the R basilic vein w/ tip in the low SVC    PICC INSERTION  02/21/2014    5 fr DL BioFlo Navilyst PICC, 46 cm (3 cm external) in the L basilic vein w/ tip in the SVC RA junction.    THYROIDECTOMY  03/2010     TRANSPLANT LIVER RECIPIENT LIVING UNRELATED  05/2002     No current outpatient medications on file.    Allergies   Allergen Reactions    Fluconazole Hives and Itching     Full body hives    [See intradermal skin testing results from 8/2/2019]    Mycophenolate Diarrhea and Nausea and Vomiting     Patient stated it was chronic and lasted months      Penicillins Anaphylaxis, Hives, Itching and Rash     [See intradermal skin testing results from 8/2/2019]      Simvastatin Muscle Pain (Myalgia)     severe  Other reaction(s): Myalgia caused by statin    Methotrexate Other (See Comments)     Other reaction(s): Sore  Sores in mouth, esophagus, and stomach.       Morphine And Codeine Itching and Other (See Comments)     Psych disturbance  Other reaction(s): Confusion, Mood alteration    Quinolones Anxiety, Dizziness, Headache, Other (See Comments), Palpitations and Unknown     Other reaction(s): Hyperactive behavior, Lightheadedness, Mood alteration    Dizzy, light headed    Dizziness, shaky, and jumpy    Benadryl [Diphenhydramine Hcl]      Insomnia     Capsules, Empty Gelatin [Gelatin]     Lansoprazole Diarrhea    Azithromycin Itching     [See intradermal skin testing results from 8/2/2019]    Bactrim [Sulfamethoxazole-Trimethoprim] Other (See Comments)     Numb mouth, tingling lips (treated with anti-histamines)    Cephalosporins Itching     [See intradermal skin testing results from 8/2/2019]    Ciprofloxacin Hcl Other (See Comments) and Dizziness     Insomnia, mood lability, Irregular heart beat         Doxycycline Itching and Unknown     [See intradermal skin testing results from 8/2/2019]    Lisinopril Cough    Omeprazole Itching    Tolectin [Nsaids] Rash    Tolmetin Rash and Itching    Tramadol Rash, Hives and Itching     Family History  Family History   Problem Relation Age of Onset    Hypertension Mother     Endometrial Cancer Mother     Hyperlipidemia Mother     Prostate Cancer Father     Macular Degeneration  "Father     Cancer - colorectal Maternal Grandmother         in her 80's, has surgery and removal of part of kidney,  at age 98    Heart Disease Maternal Grandfather          at 98    Glaucoma Maternal Grandfather     Cerebrovascular Disease Paternal Grandmother         in her 80's    Hypertension Paternal Grandmother     Heart Disease Paternal Grandfather         MI    Alzheimer Disease Paternal Grandfather     Allergies Son     Neurologic Disorder Daughter         Migraines    Breast Cancer Other     Anesthesia Reaction No family hx of     Crohn's Disease No family hx of     Ulcerative Colitis No family hx of     Melanoma No family hx of     Skin Cancer No family hx of      Social History   Social History     Tobacco Use    Smoking status: Former     Current packs/day: 0.00     Average packs/day: 1 pack/day for 18.0 years (18.0 ttl pk-yrs)     Types: Cigarettes     Start date: 1967     Quit date: 1985     Years since quittin.8    Smokeless tobacco: Never   Vaping Use    Vaping status: Never Used   Substance Use Topics    Alcohol use: Yes     Alcohol/week: 0.0 standard drinks of alcohol     Comment: rare - \"I toast at weddings\"    Drug use: No        Past medical history, past surgical history, medications, allergies, family history, and social history were reviewed with the patient. No additional pertinent items.      A medically appropriate review of systems was performed with pertinent positives and negatives noted in the HPI, and all other systems negative.    Physical Exam   BP 97/54   Pulse 82   Temp 99.3  F (37.4  C)   Resp 18   Ht 1.715 m (5' 7.5\")   Wt 86.2 kg (190 lb)   LMP 1988 (Approximate)   SpO2 94%   BMI 29.32 kg/m      GEN:, non toxic, cooperative and conversant.   HEENT: The head is normocephalic and atraumatic. Pupils are equal round and reactive to light. Extraocular motions are intact. There is no facial swelling. The neck is nontender and supple.   CV: " Regular rate and rhythm. 2+ radial pulses bilaterally.  PULM: Clear to auscultation bilaterally.  ABD: Soft, nontender, nondistended.   EXT: Full range of motion.  No edema.  NEURO: Cranial nerves II through XII are intact and symmetric.   PSYCH: Calm and cooperative, interactive.         ED Course, Procedures, & Data      Procedures                    Results for orders placed or performed during the hospital encounter of 01/19/25   XR Chest 2 Views     Status: None    Narrative    EXAM: XR CHEST 2 VIEWS  LOCATION: [facility]  DATE: 1/19/2025    INDICATION: Hypoxia, shortness of breath and fever  COMPARISON: 1/6/2025      Impression    IMPRESSION: Stable calcifications right base. Implanted cardiac monitor. No acute infiltrate or significant change.   [place] Draw     Status: None    Narrative    The following orders were created for panel order [place] Draw.  Procedure                               Abnormality         Status                     ---------                               -----------         ------                     Extra Blue Top Tube[[id number]]                              Final result               Extra Red Top Tube[[id number]]                               Final result               Extra Green Top (Lithium...[[id number]]                      Final result               Extra Purple Top Tube[[id number]]                            Final result                 Please view results for these tests on the individual orders.   Extra Blue Top Tube     Status: None   Result Value Ref Range    Hold Specimen JIC    Extra Red Top Tube     Status: None   Result Value Ref Range    Hold Specimen JIC    Extra Green Top (Lithium Heparin) Tube     Status: None   Result Value Ref Range    Hold Specimen JIC    Extra Purple Top Tube     Status: None   Result Value Ref Range    Hold Specimen JIC    Comprehensive metabolic panel     Status: Abnormal   Result Value Ref Range     Sodium 139 135 - 145 mmol/L    Potassium 3.9 3.4 - 5.3 mmol/L    Carbon Dioxide (CO2) 24 22 - 29 mmol/L    Anion Gap 13 7 - 15 mmol/L    Urea Nitrogen 33.7 (H) 8.0 - 23.0 mg/dL    Creatinine 1.80 (H) 0.51 - 0.95 mg/dL    GFR Estimate 29 (L) >60 mL/min/1.73m2    Calcium 8.9 8.8 - 10.4 mg/dL    Chloride 102 98 - 107 mmol/L    Glucose 114 (H) 70 - 99 mg/dL    Alkaline Phosphatase 113 40 - 150 U/L    AST 32 0 - 45 U/L    ALT 29 0 - 50 U/L    Protein Total 6.4 6.4 - 8.3 g/dL    Albumin 3.4 (L) 3.5 - 5.2 g/dL    Bilirubin Total 0.4 <=1.2 mg/dL   Lactic acid whole blood with 1x repeat in 2 hr when >2     Status: Normal   Result Value Ref Range    Lactic Acid, Initial 1.0 0.7 - 2.0 mmol/L   Troponin T, High Sensitivity     Status: Abnormal   Result Value Ref Range    Troponin T, High Sensitivity 39 (H) <=14 ng/L   Influenza A/B, RSV and SARS-CoV2 PCR (COVID-19) Nasopharyngeal     Status: Normal    Specimen: Nasopharyngeal; Swab   Result Value Ref Range    Influenza A PCR Negative Negative    Influenza B PCR Negative Negative    RSV PCR Negative Negative    SARS CoV2 PCR Negative Negative    Narrative    Testing was performed using the Xpert Xpress CoV2/Flu/RSV Assay on the [employer] GeneXpert Instrument. This test should be ordered for the detection of SARS-CoV2, influenza, and RSV viruses in individuals with signs and symptoms of respiratory tract infection. This test is for in vitro diagnostic use under the [place] FDA for laboratories certified under CLIA to perform high or moderate complexity testing. This test has been [place] FDA cleared. A negative result does not rule out the presence of PCR inhibitors in the specimen or target RNA in concentration below the limit of detection for the assay. If only one viral target is positive but coinfection with multiple targets is suspected, the sample should be re-tested with another FDA cleared, approved, or authorized test, if coninfection would change clinical management. This test  was validated by the [facility]. These laboratories are certified under the Clinical Laboratory Improvement Amendments of 1988 (CLIA-88) as qualified to perfom high complexity laboratory testing.   UA with Microscopic reflex to Culture     Status: Abnormal    Specimen: Urine, Midstream   Result Value Ref Range    Color Urine Light Yellow Colorless, Straw, Light Yellow, Yellow    Appearance Urine Clear Clear    Glucose Urine Negative Negative mg/dL    Bilirubin Urine Negative Negative    Ketones Urine Negative Negative mg/dL    Specific Gravity Urine 1.010 1.003 - 1.035    Blood Urine Negative Negative    pH Urine 6.0 5.0 - 7.0    Protein Albumin Urine 20 (A) Negative mg/dL    Urobilinogen Urine Normal Normal, 2.0 mg/dL    Nitrite Urine Negative Negative    Leukocyte Esterase Urine Trace (A) Negative    Mucus Urine Present (A) None Seen /LPF    RBC Urine <1 <=2 /HPF    WBC Urine 7 (H) <=5 /HPF    Squamous Epithelials Urine 2 (H) <=1 /HPF    Narrative    Urine Culture not indicated   CBC with platelets and differential     Status: Abnormal   Result Value Ref Range    WBC Count 10.2 4.0 - 11.0 10e3/uL    RBC Count 3.66 (L) 3.80 - 5.20 10e6/uL    Hemoglobin 10.3 (L) 11.7 - 15.7 g/dL    Hematocrit 32.2 (L) 35.0 - 47.0 %    MCV 88 78 - 100 fL    MCH 28.1 26.5 - 33.0 pg    MCHC 32.0 31.5 - 36.5 g/dL    RDW 15.4 (H) 10.0 - 15.0 %    Platelet Count 397 150 - 450 10e3/uL    % Neutrophils 77 %    % Lymphocytes 10 %    % Monocytes 9 %    % Eosinophils 1 %    % Basophils 1 %    % Immature Granulocytes 3 %    NRBCs per 100 WBC 0 <1 /100    Absolute Neutrophils 7.8 1.6 - 8.3 10e3/uL    Absolute Lymphocytes 1.0 0.8 - 5.3 10e3/uL    Absolute Monocytes 0.9 0.0 - 1.3 10e3/uL    Absolute Eosinophils 0.1 0.0 - 0.7 10e3/uL    Absolute Basophils 0.1 0.0 - 0.2 10e3/uL    Absolute Immature Granulocytes 0.3 <=0.4 10e3/uL    Absolute NRBCs 0.0 10e3/uL   Troponin T, High Sensitivity     Status: Abnormal   Result Value Ref  Range    Troponin T, High Sensitivity 32 (H) <=14 ng/L   Procalcitonin     Status: Normal   Result Value Ref Range    Procalcitonin 0.09 <0.50 ng/mL   Glucose by meter     Status: Abnormal   Result Value Ref Range    GLUCOSE BY METER POCT 158 (H) 70 - 99 mg/dL   CBC with platelets differential     Status: Abnormal    Narrative    The following orders were created for panel order CBC with platelets differential.  Procedure                               Abnormality         Status                     ---------                               -----------         ------                     CBC with platelets and d...[[id number]]  Abnormal            Final result                 Please view results for these tests on the individual orders.     Medications   amLODIPine (NORVASC) tablet 2.5 mg ( Oral Automatically Held 1/23/25 0800)   apixaban ANTICOAGULANT (ELIQUIS) tablet 2.5 mg (2.5 mg Oral $Given 1/19/25 2945)   calcitRIOL (ROCALTROL) capsule 0.25 mcg (has no administration in time range)   ezetimibe (ZETIA) tablet 10 mg (has no administration in time range)   gabapentin (NEURONTIN) solution 300 mg (300 mg Oral $Given 1/19/25 7306)   levothyroxine (SYNTHROID/LEVOTHROID) tablet 175 mcg (has no administration in time range)   metoprolol succinate ER (TOPROL XL) 24 hr tablet 25 mg (has no administration in time range)   multivitamin  with lutein (OCUVITE WITH LUTEIN) per capsule 1 capsule (has no administration in time range)   Nutrisource Fiber PO packet 1 each (has no administration in time range)   predniSONE (DELTASONE) tablet 10 mg (has no administration in time range)   sirolimus (GENERIC EQUIVALENT) tablet 1 mg (has no administration in time range)   ursodiol (ACTIGALL) tablet 250 mg (250 mg Oral $Given 1/19/25 4479)   lidocaine 1 % 0.1-1 mL (has no administration in time range)   lidocaine (LMX4) cream (has no administration in time range)   sodium chloride (PF) 0.9% PF flush 3 mL (3 mLs Intracatheter Not Given  1/20/25 0009)   sodium chloride (PF) 0.9% PF flush 3 mL (has no administration in time range)   senna-docusate (SENOKOT-S/PERICOLACE) 8.6-50 MG per tablet 1 tablet (has no administration in time range)     Or   senna-docusate (SENOKOT-S/PERICOLACE) 8.6-50 MG per tablet 2 tablet (has no administration in time range)   calcium carbonate (TUMS) chewable tablet 1,000 mg (has no administration in time range)   glucose gel 15-30 g (has no administration in time range)     Or   dextrose 50 % injection 25-50 mL (has no administration in time range)     Or   glucagon injection 1 mg (has no administration in time range)   sodium chloride 0.9 % infusion ( Intravenous $New Bag 1/20/25 0003)   acetaminophen (TYLENOL) tablet 650 mg (650 mg Oral $Given 1/19/25 2358)     Or   acetaminophen (TYLENOL) Suppository 650 mg ( Rectal See Alternative 1/19/25 2358)   melatonin tablet 1 mg (1 mg Oral $Given 1/19/25 2358)   insulin aspart (NovoLOG) injection (RAPID ACTING) (has no administration in time range)   insulin aspart (NovoLOG) injection (RAPID ACTING) ( Subcutaneous Not Given 1/19/25 2335)   sodium chloride 0.9% BOLUS 1,000 mL (1,000 mLs Intravenous $New Bag 1/19/25 2006)     Labs Ordered and Resulted from Time of ED Arrival to Time of ED Departure   COMPREHENSIVE METABOLIC PANEL - Abnormal       Result Value    Sodium 139      Potassium 3.9      Carbon Dioxide (CO2) 24      Anion Gap 13      Urea Nitrogen 33.7 (*)     Creatinine 1.80 (*)     GFR Estimate 29 (*)     Calcium 8.9      Chloride 102      Glucose 114 (*)     Alkaline Phosphatase 113      AST 32      ALT 29      Protein Total 6.4      Albumin 3.4 (*)     Bilirubin Total 0.4     TROPONIN T, HIGH SENSITIVITY - Abnormal    Troponin T, High Sensitivity 39 (*)    ROUTINE UA WITH MICROSCOPIC REFLEX TO CULTURE - Abnormal    Color Urine Light Yellow      Appearance Urine Clear      Glucose Urine Negative      Bilirubin Urine Negative      Ketones Urine Negative      Specific  Gravity Urine 1.010      Blood Urine Negative      pH Urine 6.0      Protein Albumin Urine 20 (*)     Urobilinogen Urine Normal      Nitrite Urine Negative      Leukocyte Esterase Urine Trace (*)     Mucus Urine Present (*)     RBC Urine <1      WBC Urine 7 (*)     Squamous Epithelials Urine 2 (*)    CBC WITH PLATELETS AND DIFFERENTIAL - Abnormal    WBC Count 10.2      RBC Count 3.66 (*)     Hemoglobin 10.3 (*)     Hematocrit 32.2 (*)     MCV 88      MCH 28.1      MCHC 32.0      RDW 15.4 (*)     Platelet Count 397      % Neutrophils 77      % Lymphocytes 10      % Monocytes 9      % Eosinophils 1      % Basophils 1      % Immature Granulocytes 3      NRBCs per 100 WBC 0      Absolute Neutrophils 7.8      Absolute Lymphocytes 1.0      Absolute Monocytes 0.9      Absolute Eosinophils 0.1      Absolute Basophils 0.1      Absolute Immature Granulocytes 0.3      Absolute NRBCs 0.0     TROPONIN T, HIGH SENSITIVITY - Abnormal    Troponin T, High Sensitivity 32 (*)    LACTIC ACID WHOLE BLOOD WITH 1X REPEAT IN 2 HR WHEN >2 - Normal    Lactic Acid, Initial 1.0     INFLUENZA A/B, RSV AND SARS-COV2 PCR - Normal    Influenza A PCR Negative      Influenza B PCR Negative      RSV PCR Negative      SARS CoV2 PCR Negative     PROCALCITONIN - Normal    Procalcitonin 0.09     BLOOD CULTURE   BLOOD CULTURE   URINE CULTURE   RESPIRATORY PANEL PCR     XR Chest 2 Views   Final Result   IMPRESSION: Stable calcifications right base. Implanted cardiac monitor. No acute infiltrate or significant change.      XR Pelvis w Hip Left 1 View    (Results Pending)            Medical Decision Making Matrix    Problems Addressed   MDM High: 1 acute or chronic illness or injury that poses a threat to life or bodily function     Data   Considered   Data Extensive: Order of (or considering) each unique test (Cat 1), Review of the results of each unique test (Cat 1), and Independent interpretation of a test performed by another practitioner (Cat 2)      Risk of Patient Management                  Assessment & Plan    74-year-old female with complicated medical history including immunosuppression status post liver transplantation, CKD, diabetes, and recent hospitalization for infectious workup with ultimate determination of pneumonia and completing antibiotic course.  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  Desaturations to 85% when on room air correcting to 100% on 2 L nasal cannula.  IV established and labs sent.    Laboratory notable essentially for increased creatinine compared to baseline to 1.8.  Troponin downtrending to 39.  WBC 10.2  Chest x-ray without clear evidence of pneumonia  Viral panel negative for COVID, influenza or RSV  Urinalysis without convincing evidence for UTI, and patient without urinary symptoms.    Given absence of fever here today, procalc .09,  uncomplicated infectious workup recently discharged off antibiotics, will withhold antibiotics at this time.  Discussed with hospitalist who agrees.  Will admit and monitor for fevers with blood cultures and urine culture pending and further w/unit(s) for hypoxia.     I have reviewed the nursing notes. I have reviewed the findings, diagnosis, plan and need for follow up with the patient.    Current Discharge Medication List          Final diagnoses:   Shortness of breath       Mauricio Gomez MD  [facility] EMERGENCY DEPARTMENT  January 19, 2025       Mauricio Gomez MD  01/20/25 0030

## 2025-01-19 NOTE — ED TRIAGE NOTES
"Patient BIBA from home for home temp 101.8 and respiratory distress. EMS found her hypotensive 80/55. Patient reported that she was hospitalized in [facility] from 01/06/25-01/14/2025 for fever unknown etiology and hypotensive.  Discharged last Tuesday, but has been on and off fever since discharge.     BP 94/55   Pulse 85   Temp 99.1  F (37.3  C) (Axillary)   Resp 18   Ht 1.715 m (5' 7.5\")   Wt 86.2 kg (190 lb)   LMP 06/01/1988 (Approximate)   SpO2 100%   BMI 29.32 kg/m         Triage Assessment (Adult)       Row Name 01/19/25 1702          Triage Assessment    Airway WDL WDL        Respiratory WDL    Respiratory WDL X;rhythm/pattern     Rhythm/Pattern, Respiratory shortness of breath        Skin Circulation/Temperature WDL    Skin Circulation/Temperature WDL WDL        Cardiac WDL    Cardiac WDL WDL        Peripheral/Neurovascular WDL    Peripheral Neurovascular WDL WDL        Cognitive/Neuro/Behavioral WDL    Cognitive/Neuro/Behavioral WDL WDL                     "

## 2025-01-20 ENCOUNTER — APPOINTMENT (OUTPATIENT)
Dept: GENERAL RADIOLOGY | Facility: CLINIC | Age: 75
End: 2025-01-20
Payer: MEDICARE

## 2025-01-20 ENCOUNTER — APPOINTMENT (OUTPATIENT)
Dept: PHYSICAL THERAPY | Facility: CLINIC | Age: 75
DRG: 206 | End: 2025-01-20
Payer: MEDICARE

## 2025-01-20 ENCOUNTER — APPOINTMENT (OUTPATIENT)
Dept: OCCUPATIONAL THERAPY | Facility: CLINIC | Age: 75
DRG: 206 | End: 2025-01-20
Payer: MEDICARE

## 2025-01-20 LAB
ANION GAP SERPL CALCULATED.3IONS-SCNC: 11 MMOL/L (ref 7–15)
BACTERIA UR CULT: NORMAL
BUN SERPL-MCNC: 35 MG/DL (ref 8–23)
C PNEUM DNA SPEC QL NAA+PROBE: NOT DETECTED
CALCIUM SERPL-MCNC: 8.6 MG/DL (ref 8.8–10.4)
CHLORIDE SERPL-SCNC: 108 MMOL/L (ref 98–107)
CREAT SERPL-MCNC: 1.61 MG/DL (ref 0.51–0.95)
CRYPTOC AG SPEC QL: NEGATIVE
EGFRCR SERPLBLD CKD-EPI 2021: 33 ML/MIN/1.73M2
ENTEROCOCCUS FAECALIS: NOT DETECTED
ENTEROCOCCUS FAECIUM: NOT DETECTED
ERYTHROCYTE [DISTWIDTH] IN BLOOD BY AUTOMATED COUNT: 15.4 % (ref 10–15)
FLUAV H1 2009 PAND RNA SPEC QL NAA+PROBE: NOT DETECTED
FLUAV H1 RNA SPEC QL NAA+PROBE: NOT DETECTED
FLUAV H3 RNA SPEC QL NAA+PROBE: NOT DETECTED
FLUAV RNA SPEC QL NAA+PROBE: NOT DETECTED
FLUBV RNA SPEC QL NAA+PROBE: NOT DETECTED
GLUCOSE BLDC GLUCOMTR-MCNC: 144 MG/DL (ref 70–99)
GLUCOSE BLDC GLUCOMTR-MCNC: 149 MG/DL (ref 70–99)
GLUCOSE BLDC GLUCOMTR-MCNC: 201 MG/DL (ref 70–99)
GLUCOSE BLDC GLUCOMTR-MCNC: 93 MG/DL (ref 70–99)
GLUCOSE SERPL-MCNC: 128 MG/DL (ref 70–99)
HADV DNA SPEC QL NAA+PROBE: NOT DETECTED
HCO3 SERPL-SCNC: 22 MMOL/L (ref 22–29)
HCOV PNL SPEC NAA+PROBE: NOT DETECTED
HCT VFR BLD AUTO: 28.7 % (ref 35–47)
HGB BLD-MCNC: 8.7 G/DL (ref 11.7–15.7)
HIV 1+2 AB+HIV1 P24 AG SERPL QL IA: NONREACTIVE
HMPV RNA SPEC QL NAA+PROBE: NOT DETECTED
HOLD SPECIMEN: NORMAL
HOLD SPECIMEN: NORMAL
HPIV1 RNA SPEC QL NAA+PROBE: NOT DETECTED
HPIV2 RNA SPEC QL NAA+PROBE: NOT DETECTED
HPIV3 RNA SPEC QL NAA+PROBE: NOT DETECTED
HPIV4 RNA SPEC QL NAA+PROBE: NOT DETECTED
LDH SERPL L TO P-CCNC: 266 U/L (ref 0–250)
LISTERIA SPECIES (DETECTED/NOT DETECTED): NOT DETECTED
M PNEUMO DNA SPEC QL NAA+PROBE: NOT DETECTED
MCH RBC QN AUTO: 27.4 PG (ref 26.5–33)
MCHC RBC AUTO-ENTMCNC: 30.3 G/DL (ref 31.5–36.5)
MCV RBC AUTO: 91 FL (ref 78–100)
PLATELET # BLD AUTO: 361 10E3/UL (ref 150–450)
POTASSIUM SERPL-SCNC: 4.2 MMOL/L (ref 3.4–5.3)
RBC # BLD AUTO: 3.17 10E6/UL (ref 3.8–5.2)
RSV RNA SPEC QL NAA+PROBE: NOT DETECTED
RSV RNA SPEC QL NAA+PROBE: NOT DETECTED
RV+EV RNA SPEC QL NAA+PROBE: NOT DETECTED
SODIUM SERPL-SCNC: 141 MMOL/L (ref 135–145)
SPECIMEN TYPE: NORMAL
STAPHYLOCOCCUS AUREUS: NOT DETECTED
STAPHYLOCOCCUS EPIDERMIDIS: DETECTED
STAPHYLOCOCCUS LUGDUNENSIS: NOT DETECTED
STREPTOCOCCUS AGALACTIAE: NOT DETECTED
STREPTOCOCCUS ANGINOSUS GROUP: NOT DETECTED
STREPTOCOCCUS PNEUMONIAE: NOT DETECTED
STREPTOCOCCUS PYOGENES: NOT DETECTED
STREPTOCOCCUS SPECIES: NOT DETECTED
TSH SERPL DL<=0.005 MIU/L-ACNC: 1.53 UIU/ML (ref 0.3–4.2)
WBC # BLD AUTO: 7 10E3/UL (ref 4–11)

## 2025-01-20 PROCEDURE — 87798 DETECT AGENT NOS DNA AMP: CPT | Performed by: PHYSICIAN ASSISTANT

## 2025-01-20 PROCEDURE — 97530 THERAPEUTIC ACTIVITIES: CPT | Mod: GP

## 2025-01-20 PROCEDURE — 73502 X-RAY EXAM HIP UNI 2-3 VIEWS: CPT

## 2025-01-20 PROCEDURE — 85041 AUTOMATED RBC COUNT: CPT

## 2025-01-20 PROCEDURE — 97110 THERAPEUTIC EXERCISES: CPT | Mod: GO

## 2025-01-20 PROCEDURE — 250N000013 HC RX MED GY IP 250 OP 250 PS 637: Performed by: NURSE PRACTITIONER

## 2025-01-20 PROCEDURE — 82435 ASSAY OF BLOOD CHLORIDE: CPT

## 2025-01-20 PROCEDURE — 97161 PT EVAL LOW COMPLEX 20 MIN: CPT | Mod: GP

## 2025-01-20 PROCEDURE — 99221 1ST HOSP IP/OBS SF/LOW 40: CPT | Performed by: INTERNAL MEDICINE

## 2025-01-20 PROCEDURE — 250N000013 HC RX MED GY IP 250 OP 250 PS 637

## 2025-01-20 PROCEDURE — 86778 TOXOPLASMA ANTIBODY IGM: CPT | Performed by: PHYSICIAN ASSISTANT

## 2025-01-20 PROCEDURE — 87899 AGENT NOS ASSAY W/OPTIC: CPT | Performed by: PHYSICIAN ASSISTANT

## 2025-01-20 PROCEDURE — 86777 TOXOPLASMA ANTIBODY: CPT | Performed by: PHYSICIAN ASSISTANT

## 2025-01-20 PROCEDURE — 97165 OT EVAL LOW COMPLEX 30 MIN: CPT | Mod: GO

## 2025-01-20 PROCEDURE — 250N000012 HC RX MED GY IP 250 OP 636 PS 637

## 2025-01-20 PROCEDURE — 99207 PR APP CREDIT; MD BILLING SHARED VISIT: CPT | Mod: FS

## 2025-01-20 PROCEDURE — 99232 SBSQ HOSP IP/OBS MODERATE 35: CPT | Mod: FS | Performed by: INTERNAL MEDICINE

## 2025-01-20 PROCEDURE — 86481 TB AG RESPONSE T-CELL SUSP: CPT | Performed by: PHYSICIAN ASSISTANT

## 2025-01-20 PROCEDURE — 86780 TREPONEMA PALLIDUM: CPT | Performed by: PHYSICIAN ASSISTANT

## 2025-01-20 PROCEDURE — 85018 HEMOGLOBIN: CPT

## 2025-01-20 PROCEDURE — 87389 HIV-1 AG W/HIV-1&-2 AB AG IA: CPT | Performed by: PHYSICIAN ASSISTANT

## 2025-01-20 PROCEDURE — 87486 CHLMYD PNEUM DNA AMP PROBE: CPT

## 2025-01-20 PROCEDURE — 87799 DETECT AGENT NOS DNA QUANT: CPT | Performed by: PHYSICIAN ASSISTANT

## 2025-01-20 PROCEDURE — 87449 NOS EACH ORGANISM AG IA: CPT | Performed by: PHYSICIAN ASSISTANT

## 2025-01-20 PROCEDURE — 80048 BASIC METABOLIC PNL TOTAL CA: CPT

## 2025-01-20 PROCEDURE — 86747 PARVOVIRUS ANTIBODY: CPT | Performed by: PHYSICIAN ASSISTANT

## 2025-01-20 PROCEDURE — 258N000003 HC RX IP 258 OP 636

## 2025-01-20 PROCEDURE — 36415 COLL VENOUS BLD VENIPUNCTURE: CPT

## 2025-01-20 PROCEDURE — 82565 ASSAY OF CREATININE: CPT

## 2025-01-20 PROCEDURE — 73502 X-RAY EXAM HIP UNI 2-3 VIEWS: CPT | Mod: 26 | Performed by: RADIOLOGY

## 2025-01-20 PROCEDURE — 84443 ASSAY THYROID STIM HORMONE: CPT | Performed by: NURSE PRACTITIONER

## 2025-01-20 PROCEDURE — 36415 COLL VENOUS BLD VENIPUNCTURE: CPT | Performed by: PHYSICIAN ASSISTANT

## 2025-01-20 PROCEDURE — 36415 COLL VENOUS BLD VENIPUNCTURE: CPT | Performed by: NURSE PRACTITIONER

## 2025-01-20 PROCEDURE — 87633 RESP VIRUS 12-25 TARGETS: CPT

## 2025-01-20 PROCEDURE — 87116 MYCOBACTERIA CULTURE: CPT | Performed by: PHYSICIAN ASSISTANT

## 2025-01-20 PROCEDURE — 87040 BLOOD CULTURE FOR BACTERIA: CPT | Performed by: NURSE PRACTITIONER

## 2025-01-20 PROCEDURE — 120N000002 HC R&B MED SURG/OB UMMC

## 2025-01-20 PROCEDURE — 83615 LACTATE (LD) (LDH) ENZYME: CPT | Performed by: PHYSICIAN ASSISTANT

## 2025-01-20 RX ORDER — OMEGA-3-ACID ETHYL ESTERS 1 G/1
2 CAPSULE, LIQUID FILLED ORAL DAILY
Status: DISCONTINUED | OUTPATIENT
Start: 2025-01-20 | End: 2025-01-21

## 2025-01-20 RX ADMIN — Medication 1 CAPSULE: at 20:21

## 2025-01-20 RX ADMIN — METOPROLOL SUCCINATE 25 MG: 25 TABLET, EXTENDED RELEASE ORAL at 07:56

## 2025-01-20 RX ADMIN — PREDNISONE 10 MG: 5 TABLET ORAL at 07:55

## 2025-01-20 RX ADMIN — APIXABAN 2.5 MG: 2.5 TABLET, FILM COATED ORAL at 20:20

## 2025-01-20 RX ADMIN — URSODIOL 250 MG: 250 TABLET, FILM COATED ORAL at 07:55

## 2025-01-20 RX ADMIN — CALCIUM CARBONATE (ANTACID) CHEW TAB 500 MG 1000 MG: 500 CHEW TAB at 15:39

## 2025-01-20 RX ADMIN — SIROLIMUS 1 MG: 1 TABLET ORAL at 07:55

## 2025-01-20 RX ADMIN — SODIUM CHLORIDE: 9 INJECTION, SOLUTION INTRAVENOUS at 00:03

## 2025-01-20 RX ADMIN — URSODIOL 250 MG: 250 TABLET, FILM COATED ORAL at 20:20

## 2025-01-20 RX ADMIN — GABAPENTIN 300 MG: 250 SOLUTION ORAL at 22:45

## 2025-01-20 RX ADMIN — Medication 1 CAPSULE: at 07:55

## 2025-01-20 RX ADMIN — LEVOTHYROXINE SODIUM 175 MCG: 0.17 TABLET ORAL at 07:56

## 2025-01-20 RX ADMIN — CALCIUM CARBONATE (ANTACID) CHEW TAB 500 MG 1000 MG: 500 CHEW TAB at 20:20

## 2025-01-20 RX ADMIN — OMEGA-3-ACID ETHYL ESTERS 2 G: 1 CAPSULE, LIQUID FILLED ORAL at 20:22

## 2025-01-20 RX ADMIN — EZETIMIBE 10 MG: 10 TABLET ORAL at 07:55

## 2025-01-20 RX ADMIN — CALCITRIOL CAPSULES 0.25 MCG 0.25 MCG: 0.25 CAPSULE ORAL at 07:56

## 2025-01-20 RX ADMIN — APIXABAN 2.5 MG: 2.5 TABLET, FILM COATED ORAL at 07:56

## 2025-01-20 ASSESSMENT — ACTIVITIES OF DAILY LIVING (ADL)
ADLS_ACUITY_SCORE: 63
ADLS_ACUITY_SCORE: 58
ADLS_ACUITY_SCORE: 63
ADLS_ACUITY_SCORE: 58
ADLS_ACUITY_SCORE: 63
ADLS_ACUITY_SCORE: 58
ADLS_ACUITY_SCORE: 59
ADLS_ACUITY_SCORE: 58
ADLS_ACUITY_SCORE: 63
ADLS_ACUITY_SCORE: 59
ADLS_ACUITY_SCORE: 58

## 2025-01-20 NOTE — PLAN OF CARE
"RN Bab-[facility]-Elqmr care note: 1208-1250                               *For vital signs and complete assessments, please see documentation flowsheets.*    Contact precaution:ESBL  Neuro:  Alert and oriented x4. Hard of hearing, R hearing aid only.   Cardiac: Not on telemetry. Vital signs stable.   Respiratory: Oxygen saturation >95% on 2L NC.  GI: No report of nausea or vomiting. On regular diet.   : Adequate urine output via: purewick. LBM: (PTA).  Endo: Blood sugar stable. Sliding scale insulin. 0 units given.  Activity: Was not out of bed, uses walker at home.  Pain: At acceptable level on current regimen. Tylenol PRN given.   Skin: No new skin deficits noted.   LDAs: R/L PIV: infusing NS @100mL/hr.  Sleep: Patient slept well throughout the night.  Additional shift information: Transferred from ED around ~11p.  Plan: Please continue with patient care plan & notify the primary team with any changes to patient condition.      Problem: Adult Inpatient Plan of Care  Goal: Plan of Care Review  Description: The Plan of Care Review/Shift note should be completed every shift.  The Outcome Evaluation is a brief statement about your assessment that the patient is improving, declining, or no change.  This information will be displayed automatically on your shift  note.  Outcome: Progressing  Goal: Patient-Specific Goal (Individualized)  Description: You can add care plan individualizations to a care plan. Examples of Individualization might be:  \"Parent requests to be called daily at 9am for status\", \"I have a hard time hearing out of my right ear\", or \"Do not touch me to wake me up as it startles  me\".  Outcome: Progressing  Goal: Absence of Hospital-Acquired Illness or Injury  Outcome: Progressing  Intervention: Identify and Manage Fall Risk  Recent Flowsheet Documentation  Taken 1/19/2025 0948 by Verona Tam, RN  Safety Promotion/Fall Prevention:   activity supervised   room near nurse's station   room organization " consistent   safety round/check completed  Intervention: Prevent Skin Injury  Recent Flowsheet Documentation  Taken 1/19/2025 2359 by Verona Tam, RN  Body Position: position changed independently  Skin Protection: adhesive use limited  Intervention: Prevent Infection  Recent Flowsheet Documentation  Taken 1/19/2025 2359 by Verona Tam, RN  Infection Prevention:   single patient room provided   rest/sleep promoted   personal protective equipment utilized  Goal: Optimal Comfort and Wellbeing  Outcome: Progressing  Goal: Readiness for Transition of Care  Outcome: Progressing

## 2025-01-20 NOTE — H&P
[facility]    History and Physical - Hospitalist Service, GOLD TEAM        Date of Admission:  1/19/2025    Assessment & Plan      Luz Thompson is a 74 year old female admitted on 1/19/2025.  Medical history notable for primary biliary cirrhosis s/p liver transplant (2002) on chronic immunosuppression, atrial fibrillation, history of CVA, CKD stage III, diabetes mellitus type 2, hypothyroidism, hypertension, PAD.  Presented to emergency department for shortness of breath, hypoxia, fever      #Hypoxia, shortness of breath  #Fever  #Multiple recent hospitalizations  #History of coccidioidomycosis (2017)  Previous history of fungal infections while on immunosuppression. Recently hospitalized 12/10 to 12/15 for sepsis due to UTI, suspected pyelonephritis.  Subsequently was hospitalized between 1/3-1/5, and 1/6 to 1/14 for, URI, aspiration pneumonia vs HAP.  Patient returned to emergency department on 1/19 for concern of shortness of breath, hypoxia, fever.  Tmax at home 101.8.  EMS found her to be hypotensive 80/55.  No clear source of infection.  Reports off/on fever since recent discharge on 1/14.  In emergency department ,WBC 10.2, Covid/flu negative,  No acute findings on chest x-ray.  Blood cultures and UA ordered.  Current temperature 98.2 and hemodynamically stable.  When assessed in the emergency department, able to maintain saturations in 90s on room air.   -Order respiratory panel  -Blood cultures pending  -UA/urine culture pending  -Q4hr vitals   -CBC in a.m.  -Transplant infectious disease consult with history of fungal infection  -Will defer starting antibiotics with normal WBC and  currently hemodynamically stable.  Will await UA, blood cultures, ID recs.   -Start ABX if fever reoccurs    #Left hip pain  Patient reports left hip pain over the past week.  Does not radiate towards groin.  Worse with ambulation.  Tender to palpation over left trochanter.   Reports history of OA in other joints.  -Will start with x-ray left hip    # Concern for possible deconditioning  # Possible difficulty performing ADLs  Patient lives at independent living.  Patient and mother concerned with patient performing ADLs with current hip pain and shortness of breath.  -OT/PT consult  -Social work consult    #History of primary biliary cirrhosis, s/p liver transplant, living, unrelated allogenic (2002)  History of primary biliary cirrhosis.  Had liver transplant in 2002, unrelated living donor.  Last seen by Dr. Gentry Ramirez 10/2024.   -Continue PTA prednisone  -Continue PTA sirolimus  -Continue PTA ursodiol  -Follow-up with transplant GI as previously scheduled    #Acute kidney injury  #CKD stage IIIb  Has yet to establish with nephrologist in [place] since moving from [place].  Reports has an appointment in April to establish care.. Baseline creatinine appears to be approximately 1.4.  Upon arrival creatinine 1.80.  Suspected to be secondary to volume depletion.    -IV fluid resuscitation  -BMP in a.m.    #Hypertension  Current blood pressure 97/54  -Hold PTA amlodipine  -Continue metoprolol (For A-fib) with hold parameters    #History of paroxysmal atrial fibrillation  -Continue PTA metoprolol with blood pressure parameters  -Continue PTA apixaban    #Diabetes mellitus type 2  #Diabetic peripheral neuropathy  Last A1c 6.9 on 12/10/2024.  PTA linagliptin and gabapentin for neuropathy  -Hold PTA linagliptin  -Continue PTA gabapentin  -Point-of-care glucose monitoring  -Sliding scale insulin    #Hypothyroidism  Last TSH 3.31 on 11/14/2024  -Continue PTA Synthroid    #Hyperlipidemia  -Continue PTA Ezetimibe      Diet: Regular diet   DVT Prophylaxis: DOAC  Ron Catheter: Not present  Lines: None     Cardiac Monitoring: None  Code Status: Full    Clinically Significant Risk Factors Present on Admission               # Hypoalbuminemia: Lowest albumin = 3.4 g/dL at 1/19/2025  6:08 PM, will  "monitor as appropriate  # Drug Induced Coagulation Defect: home medication list includes an anticoagulant medication    # Hypertension: Noted on problem list      # Anemia: based on hgb <11      # DMII: A1C = 6.9 % (Ref range: <5.7 %) within past 6 months    # Overweight: Estimated body mass index is 29.32 kg/m  as calculated from the following:    Height as of this encounter: 1.715 m (5' 7.5\").    Weight as of this encounter: 86.2 kg (190 lb).              Disposition Plan     Medically Ready for Discharge: Anticipated in 2-4 Days         The patient's care was discussed with the Attending Physician, Dr. Heydi Bella  .    Kane Meyer PA-C  Hospitalist Service, [facility]  Securely message with [employer] (more info)  Text page via [facility] Paging/Directory   See signed in provider for up to date coverage information    ______________________________________________________________________    Chief Complaint   Shortness of breath, deconditioning    History is obtained from the patient    History of Present Illness   Luz Thompson is a 74 year old female admitted on 1/19/2025.  Medical history notable for primary biliary cirrhosis s/p liver transplant (2002) on chronic immunosuppression, atrial fibrillation, history of CVA, CKD stage III, diabetes mellitus type 2, hypothyroidism, hypertension, PAD.  Presented to emergency department for shortness of breath, hypoxia, fever      Past Medical History    Past Medical History:   Diagnosis Date    Anemia of chronic disease 10/17/2011    Anxiety     CKD (chronic kidney disease) stage 3, GFR 30-59 ml/min (H) 04/04/2012    Coccidioidomycosis, 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     Deep venous thrombosis     Diverticulosis of sigmoid colon " 12/21/2013    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, history of     Received Rituxan during Summer of 2016    Glaucoma     H/O esophageal varices     Hearing loss     Hyperlipidemia 04/10/2012    Says that she does not have it anymore, not on meds    Hypertension     Hypertriglyceridemia     Liver replaced by transplant (H) 10/17/2011    Dr. 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       Past Surgical History   Past Surgical History:   Procedure Laterality Date    APPENDECTOMY  1961    CATARACT IOL, RT/LT      RE12/19/2013, LE12/10/2013 - Toric lenses    CHOLECYSTECTOMY  1991    COLONOSCOPY  03/10/2014    Procedure: COLONOSCOPY;;  Surgeon: Gentry Ramirez MD;  Location:  GI    CYSTOSCOPY      ear drum repair      ENDOBRONCHIAL ULTRASOUND FLEXIBLE N/A 09/29/2017    Procedure: ENDOBRONCHIAL ULTRASOUND FLEXIBLE;  Flexible Bronchoscopy, Endobronchial Ultrasound, Transbronchial Needle Aspiration ;  Surgeon: Eden Clinton MD;  Location:  OR    ENDOSCOPIC RETROGRADE CHOLANGIOPANCREATOGRAM  09/19/2013    Procedure: ENDOSCOPIC RETROGRADE CHOLANGIOPANCREATOGRAM;  Endoscopic Retrograde Cholangiopancreatogram with single balloon enteroscopy, ballon sweep of bile duct;  Surgeon: Brett Membreno MD;  Location:  OR     KNEE SCOPE,MED/LAT MENISECTOMY Right 08/10/2012    partial medial menisectomy only    KNEE SURGERY  1966    R knee    PICC INSERTION  09/18/2013    4fr SL PASV PICC, 40cm (1cm external) in the R basilic  vein w/ tip in the low SVC    PICC INSERTION  02/21/2014    5 fr DL BioFlo Navilyst PICC, 46 cm (3 cm external) in the L basilic vein w/ tip in the SVC RA junction.    THYROIDECTOMY  03/2010    TRANSPLANT LIVER RECIPIENT LIVING UNRELATED  05/2002       Prior to Admission Medications   Prior to Admission Medications   Prescriptions Last Dose Informant Patient Reported? Taking?   Continuous Glucose Sensor (FREESTYLE NINO 2 SENSOR) MISC   No No   Sig: Change every 14 days.   D-MANNOSE PO   Yes No   Sig: Take 1 Scoop by mouth 2 times daily.   EPINEPHrine (ANY BX GENERIC EQUIV) 0.3 MG/0.3ML injection 2-pack   No No   Sig: Inject 0.3 mLs (0.3 mg) into the muscle as needed for anaphylaxis. May repeat one time in 5-15 minutes if response to initial dose is inadequate.   Ferrous Sulfate 324 MG TBEC   Yes No   Sig: Take 1 tablet by mouth 2 times daily as needed.   Multiple Vitamins-Minerals (PRESERVISION AREDS 2) CAPS   No No   Sig: Take 1 capsule by mouth 2 times daily   Nutrisource Fiber PO packet   Yes No   Sig: Take 1 packet by mouth daily.   amLODIPine (NORVASC) 5 MG tablet   No No   Sig: Take 0.5 tablets (2.5 mg) by mouth daily.   apixaban ANTICOAGULANT (ELIQUIS ANTICOAGULANT) 2.5 MG tablet   No No   Sig: Take 1 tablet (2.5 mg) by mouth 2 times daily.   calcitRIOL (ROCALTROL) 0.25 MCG capsule   No No   Sig: Take 1 capsule (0.25 mcg) by mouth daily.   estradiol (ESTRACE) 0.1 MG/GM vaginal cream   Yes No   Sig: Place vaginally every other day.   evolocumab (REPATHA SURECLICK) 140 MG/ML prefilled autoinjector   No No   Sig: Inject 1 mL (140 mg) subcutaneously every 14 days. . Please have fasting labs drawn for further refills.   ezetimibe (ZETIA) 10 MG tablet   No No   Sig: Take 1 tablet (10 mg) by mouth daily.   folic acid (FOLVITE) 1 MG tablet   No No   Sig: Take 1 tablet (1 mg) by mouth daily.   gabapentin (NEURONTIN) 250 MG/5ML solution   No No   Sig: Take 6 mLs (300 mg) by mouth at bedtime   glucose (BD GLUCOSE) 5 g  chewable tablet   No No   Sig: Take 2 tablets (10 g) by mouth as needed (low blood sugar)   hypromellose (ARTIFICIAL TEARS) 0.4 % SOLN ophthalmic solution   No No   Sig: Apply 1 drop to eye every hour as needed for dry eyes   ketoconazole (NIZORAL) 2 % external cream   No No   Sig: Apply topically 2 times daily as needed (redness and flaking). Apply to the face.   ketoconazole (NIZORAL) 2 % external shampoo   No No   Sig: Apply topically three times a week. Lather in the shower, wait 3-5 minutes before rinsing.   levothyroxine (SYNTHROID/LEVOTHROID) 175 MCG tablet   No No   Sig: Take 1 tablet (175 mcg) by mouth daily.   linagliptin (TRADJENTA) 5 MG TABS tablet   No No   Sig: Take 1 tablet (5 mg) by mouth daily.   meclizine (ANTIVERT) 25 MG tablet   No No   Sig: Take 1 tablet (25 mg) by mouth as needed for dizziness or other (migraines)   metoprolol succinate ER (TOPROL XL) 25 MG 24 hr tablet   Yes No   Sig: Take 1 tablet (25 mg) by mouth daily. Take an extra tablet daily as needed for breakthrough afib. May repeat in two hours if heart rate remains >100bpm (no more than 4 tablets per day).   omega-3 acid ethyl esters (LOVAZA) 1 g capsule   No No   Sig: Take 1 capsule by mouth 2 times daily   predniSONE (DELTASONE) 10 MG tablet   No No   Sig: Take 1 tablet (10 mg) by mouth daily.   sirolimus (GENERIC EQUIVALENT) 1 MG tablet   No No   Sig: Take 1 tablet (1 mg) by mouth daily.   ursodiol (ACTIGALL) 250 MG tablet   No No   Sig: Take 1 tablet (250 mg) by mouth 2 times daily.      Facility-Administered Medications: None        Physical Exam   Vital Signs: Temp: 98.2  F (36.8  C) Temp src: Oral BP: 97/54 Pulse: 82   Resp: 18 SpO2: 94 % O2 Device: Nasal cannula Oxygen Delivery: 2 LPM  Weight: 190 lbs 0 oz    Exam:  General: Appears stated age, no acute distress  ENT: ENT exam normal, no neck nodes or sinus tenderness  Cardiovascular: S1/S2, systolic murmur, normal rate  Respiratory: Normal respiratory effort, lung fields  clear to auscultation  Gastrointestinal: Bowel sounds normal, no tenderness to palpation   Musculoskeletal: extremities normal- no gross deformities noted and normal muscle tone  Neuropsych: Speaks clearly, answers questions appropriately      Medical Decision Making       75 MINUTES SPENT BY ME on the date of service doing chart review, history, exam, documentation & further activities per the note.      Data     I have personally reviewed the following data over the past 24 hrs:    10.2  \   10.3 (L)   / 397     139 102 33.7 (H) /  114 (H)   3.9 24 1.80 (H) \     ALT: 29 AST: 32 AP: 113 TBILI: 0.4   ALB: 3.4 (L) TOT PROTEIN: 6.4 LIPASE: N/A     Trop: 32 (H) BNP: N/A     Procal: 0.09 CRP: N/A Lactic Acid: 1.0

## 2025-01-20 NOTE — PLAN OF CARE
"BP 97/54   Pulse 82   Temp 98.2  F (36.8  C) (Oral)   Resp 18   Ht 1.715 m (5' 7.5\")   Wt 86.2 kg (190 lb)   LMP 06/01/1988 (Approximate)   SpO2 94%   BMI 29.32 kg/m      A&Ox4, c/o R hip pain and SOB. COVID-19 negative. UA sent, blood culture pending. VS remained stable and afebrile. On 2LNC. Nursing report OBS.   "

## 2025-01-20 NOTE — CONSULTS
SOLID [facility]GAN TRANSPLANT INFECTIOUS DISEASES CONSULTATION     Patient:  Luz Thompson   YOB: 1950  Date of Visit:  01/20/2025  Date of Admission: 1/19/2025  Consult Requester:Wil Beth MD          Assessment and Recommendations:   Recommendations:  Monitor off of antimicrobials- typical bacterial infection is highly unlikely with fevers for ~2 months, previous fever curve reviewed and no response to meropenem  Would have family bring in home thermometer if possible to check readings compared to hospital thermometers to ensure reading accurately  Needs XR of left hip/pelvis, low threshold for additional imaging   Due for Tdap vaccine (last 2/2014), candidate for shingrix series  The following tests have been ordered for you:   - BD glucan  - AFB blood culture  - Coccidioides antigen  - CrAg  - CMV PCR   - EBV PCR   - Enteric panel   - HIV   - LDH   - Parvo Ab and PCR   - QuantGold   - agree with Resp PCR panel   - Toxoplasma serologies   - Treponema Ab    Thank you for the consult. Transplant ID will continue to follow with you.     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     #Fever of unknown origin  Reports baseline temperature is 97.6, temperatures have been in the 99's during previous hospitalization and at home with Tmax 101.8 on home thermometer. Recent admissions with UTI (12/10-12/15), fever (1/3-1/5) and hospital acquired pneumonia  "(1/6-1/14). On review of previous admission fever curves temperatures . No change in fever curve during 7 day course of meropenem or previous UTI treatment. This, along with the presence of fevers x2 months makes a typical bacterial process unlikely. UA is not c/w infection and has no urinary symptoms.  Has hx of coccidioides and has not been on voriconazole recently. Will check coccidioides antigen and BD glucan. Histoplasma and blastomyces negative during recent admission to [facility].  Other atypical infections considered are TB, NTM, toxoplasma with remote hx cat exposure, and syphilis.  Viral etiology is possible- hx EBV viremia and this may contribute to prolonged low-grade fevers. CMV serology negative, primary infection is possible with exposure to extended family/children. Parvovirus also considered. HIV has not been checked recently. Hepatitis serologies checked post-transplant.   In terms of symptoms- limited to left hip pain, chronic diarrhea, and acute on chronic rhinorrhea/congestion. Further evaluation of hip is necessary. Will send enteric panel and respiratory panel.    #Hip pain  Difficulty ambulating for several days and pain with transfers. Fracture, septic arthritis, or muscle strain are all in ddx.     #Hx coccidioides  Dx in winter [id number], did not tolerate fluconazole due to severe rash. Treated with voriconazole, continued until her return to [place]. CXR with stable calcifications. Recent fungal antibody panel negative though unclear how reliable this is.    #Hx EBV viremia s/p rituxan 2016  Not checked for several years, no lymphadenopathy on recent abdomen/pelvis CT    #Numerous antimicrobial allergies  Per allergy note 8/2/2019 \"Prick and intradermal and patch testing to fluconazole, doxycycline, azithromycin, penicillins, and cephalosporins with immediate and delayed readings  being negative. \" States she will never take fluconazole again. Would be candidate for oral provocation " testing in future with pcn.       Previous ID Issues:  - Recurrent UTIs, several c/b bacteremia. 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 Has fosfomycin prn at onset of sx  - Coccidioides infection (dx 2016)  - EBV viremia - moderate, treated with Rituxan (8/31/16)  - Left otitis, followed with ENT due to hx perforation  - Hx angular chelitis and thrush      Other ID issues:  - QTc interval:  430msec on 1/6/25  - Bacterial prophylaxis:  none  - Pneumocystis prophylaxis:  none  - Viral serostatus: EBV+, CMV-  - Viral prophylaxis:  none  - Fungal prophylaxis:  none, previously voriconazole  - Immunosuppression: prednisone 10mg daily, serolimus  - Immunization status:  last Tdap (2/2014)  - Gamma globulin status:    - Isolation status:            History of Present Illness:   Transplants:  5/22/2002 (Liver), Postoperative day:  8279     Luz Thompson is a 74 year old female with a history of primary biliary cirrhosis s/p liver transplant (2002) immunosuppressed on sirolimus and prednisone, additional hx of CKD III, type II DM, hypothyroidism, HTN, 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     Hard of hearing, hearing aids not charged today. Reports fevers since before Thanksgiving, happening typically later in the day and continuing until she goes to bed. Missed Thanksgiving and a family get together in January because of symptoms. Fevers are associated with chills, shivering, body aches, and malaise. They have not seemed to resolve even through her several recent hospitalizations.     She has chronic diarrhea, which seems to be at baseline. Reports rhinorrhea and congestion, that seemed to be worse during recent hospitalization for pneumonia. Has a nonproductive cough. Left hip has been painful and makes it hard to walk-  "feels better when using walker compared to cane but had pain even with transfers yesterday. History of UTIs, typically gets dysuria and incontinence and does not any of those symptoms now.     Denies weight loss, night sweats, headache, vision change, sore throat, oral sores or pain (wears dentures, bottom fit poorly), sputum production, nausea, vomiting, abdominal pain, skin rashes or wounds, focal joint pain aside from left hip, swollen or red joints.     Social/exposure hx: Lives in apartment in [place], [place]. Previously lived in [place], [place] after retiring. No pets/animal exposures currently, previously had dogs for many years, had cats when her kids were young, had birds more remote past. 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 Does not do any gardening, landscaping, or yard work. International travel to [place]. Per chart review lived on a farm for 8 years in the 1970's with hogs, cows, corn and soybeans.         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 Gentry Ramirez, 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            Past Surgical History:     Past Surgical History:   Procedure Laterality Date    APPENDECTOMY  1961    CATARACT IOL, RT/LT      RE12/19/2013, LE12/10/2013 - Toric lenses    CHOLECYSTECTOMY  1991    COLONOSCOPY  03/10/2014    Procedure: COLONOSCOPY;;  Surgeon: Gentry Ramirez MD;  Location: [facility] GI    CYSTOSCOPY      ear drum repair      ENDOBRONCHIAL ULTRASOUND FLEXIBLE N/A 09/29/2017    Procedure: ENDOBRONCHIAL ULTRASOUND FLEXIBLE;  Flexible Bronchoscopy, Endobronchial Ultrasound, Transbronchial Needle Aspiration ;  Surgeon: Eden Clinton MD;  Location: [facility] OR    ENDOSCOPIC RETROGRADE CHOLANGIOPANCREATOGRAM  09/19/2013    Procedure: ENDOSCOPIC RETROGRADE CHOLANGIOPANCREATOGRAM;  Endoscopic Retrograde Cholangiopancreatogram with single balloon enteroscopy, ballon sweep of bile duct;  Surgeon: Brett Membreno MD;  Location: [facility] OR     KNEE SCOPE,MED/LAT MENISECTOMY Right 08/10/2012    partial medial menisectomy only    KNEE SURGERY  1966    R knee    PICC INSERTION  09/18/2013    4fr SL PASV PICC, 40cm (1cm external) in the R basilic vein w/ tip in the low SVC    PICC INSERTION  02/21/2014    5 fr DL BioFlo Navilyst PICC, 46 cm (3 cm external) in the L basilic vein w/ tip in the SVC RA junction.    THYROIDECTOMY  03/2010    TRANSPLANT LIVER RECIPIENT LIVING UNRELATED  05/2002            Family History:   Reviewed and non-contributory.   Family History   Problem Relation Age of Onset    Hypertension Mother     Endometrial  "Cancer Mother     Hyperlipidemia Mother     Prostate Cancer Father     Macular Degeneration Father     Cancer - colorectal Maternal Grandmother         in her 80's, has surgery and removal of part of kidney,  at age 98    Heart Disease Maternal Grandfather          at 98    Glaucoma Maternal Grandfather     Cerebrovascular Disease Paternal Grandmother         in her 80's    Hypertension Paternal Grandmother     Heart Disease Paternal Grandfather         MI    Alzheimer Disease Paternal Grandfather     Allergies Son     Neurologic Disorder Daughter         Migraines    Breast Cancer Other     Anesthesia Reaction No family hx of     Crohn's Disease No family hx of     Ulcerative Colitis No family hx of     Melanoma No family hx of     Skin Cancer No family hx of             Social History:     Social History     Tobacco Use    Smoking status: Former     Current packs/day: 0.00     Average packs/day: 1 pack/day for 18.0 years (18.0 ttl pk-yrs)     Types: Cigarettes     Start date: 1967     Quit date: 1985     Years since quittin.8    Smokeless tobacco: Never   Substance Use Topics    Alcohol use: Yes     Alcohol/week: 0.0 standard drinks of alcohol     Comment: rare - \"I toast at weddings\"     History   Sexual Activity    Sexual activity: Yes    Partners: Male    Birth control/ protection: Post-menopausal            Current Medications:     Current Facility-Administered Medications   Medication Dose Route Frequency Provider Last Rate Last Admin    [Held by provider] amLODIPine (NORVASC) tablet 2.5 mg  2.5 mg Oral Daily Kane Meyer PA-C        apixaban ANTICOAGULANT (ELIQUIS) tablet 2.5 mg  2.5 mg Oral BID Kane Meyer PA-C   2.5 mg at 25 0756    calcitRIOL (ROCALTROL) capsule 0.25 mcg  0.25 mcg Oral Daily Kane Meyer PA-C   0.25 mcg at 25 0756    ezetimibe (ZETIA) tablet 10 mg  10 mg Oral Daily Kane Meyer PA-C   10 mg at 25 0755    gabapentin (NEURONTIN) " solution 300 mg  300 mg Oral At Bedtime Kane Meyer PA-C   300 mg at 01/19/25 6569    insulin aspart (NovoLOG) injection (RAPID ACTING)  1-7 Units Subcutaneous TID AC Kane Meyer PA-C        insulin aspart (NovoLOG) injection (RAPID ACTING)  1-5 Units Subcutaneous At Bedtime Kane Meyer PA-C        levothyroxine (SYNTHROID/LEVOTHROID) tablet 175 mcg  175 mcg Oral QAM AC Kane Meyer PA-C   175 mcg at 01/20/25 0756    metoprolol succinate ER (TOPROL XL) 24 hr tablet 25 mg  25 mg Oral Daily Kane Meyer PA-C   25 mg at 01/20/25 0756    multivitamin  with lutein (OCUVITE WITH LUTEIN) per capsule 1 capsule  1 capsule Oral BID Kane Meyer PA-C   1 capsule at 01/20/25 0755    Nutrisource Fiber PO packet 1 each  1 packet Oral Daily Kane Meyer PA-C        predniSONE (DELTASONE) tablet 10 mg  10 mg Oral Daily Kane Meyer PA-C   10 mg at 01/20/25 0755    sirolimus (GENERIC EQUIVALENT) tablet 1 mg  1 mg Oral Daily Kane Meyer PA-C   1 mg at 01/20/25 0755    sodium chloride (PF) 0.9% PF flush 3 mL  3 mL Intracatheter Q8H Kane Meyer PA-C   3 mL at 01/20/25 0802    ursodiol (ACTIGALL) tablet 250 mg  250 mg Oral BID Kane Meyer PA-C   250 mg at 01/20/25 0755            Allergies:     Allergies   Allergen Reactions    Fluconazole Hives and Itching     Full body hives    [See intradermal skin testing results from 8/2/2019]    Mycophenolate Diarrhea and Nausea and Vomiting     Patient stated it was chronic and lasted months      Penicillins Anaphylaxis, Hives, Itching and Rash     [See intradermal skin testing results from 8/2/2019]      Simvastatin Muscle Pain (Myalgia)     severe  Other reaction(s): Myalgia caused by statin    Methotrexate Other (See Comments)     Other reaction(s): Sore  Sores in mouth, esophagus, and stomach.       Morphine And Codeine Itching and Other (See Comments)     Psych disturbance  Other reaction(s): Confusion, Mood alteration    Quinolones Anxiety,  Dizziness, Headache, Other (See Comments), Palpitations and Unknown     Other reaction(s): Hyperactive behavior, Lightheadedness, Mood alteration    Dizzy, light headed    Dizziness, shaky, and jumpy    Benadryl [Diphenhydramine Hcl]      Insomnia     Capsules, Empty Gelatin [Gelatin]     Lansoprazole Diarrhea    Azithromycin Itching     [See intradermal skin testing results from 8/2/2019]    Bactrim [Sulfamethoxazole-Trimethoprim] Other (See Comments)     Numb mouth, tingling lips (treated with anti-histamines)    Cephalosporins Itching     [See intradermal skin testing results from 8/2/2019]    Ciprofloxacin Hcl Other (See Comments) and Dizziness     Insomnia, mood lability, Irregular heart beat         Doxycycline Itching and Unknown     [See intradermal skin testing results from 8/2/2019]    Lisinopril Cough    Omeprazole Itching    Tolectin [Nsaids] Rash    Tolmetin Rash and Itching    Tramadol Rash, Hives and Itching            Physical Exam:   Vitals were reviewed  Temp:  [97.7  F (36.5  C)-99.3  F (37.4  C)] 97.7  F (36.5  C)  Pulse:  [64-89] 64  Resp:  [16-18] 16  BP: ()/(51-85) 98/51  SpO2:  [94 %-100 %] 100 %    Vitals:    01/19/25 1704   Weight: 86.2 kg (190 lb)       Constitutional: Pleasant and cooperative female seen supine in bed, in NAD. Awake, alert, interactive. Hard of hearing.  HEENT: NC/AT, EOMI, sclera clear, conjunctiva normal, OP with MMM- wearing upper dentures only. No thrush.  Respiratory: No increased work of breathing, CTAB, no crackles or wheezing. On room air at time of visit.  Cardiovascular: RRR, no murmur noted. No peripheral edema.  GI: Normal bowel sounds, soft, non-distended and non-tender.  Skin: Warm, dry, well-perfused. No bruising, bleeding, rashes. Feet with missing left great toe nail, no wounds or concerning callous.   Musculoskeletal: Extremities grossly normal, non-tender, no edema.  Neurologic: A&O. Answers questions appropriately, speech normal. Moves all  extremities spontaneously.  Neuropsychiatric: Calm. Affect appropriate to situation.  Vascular access:  PIV CDI, non-tender, no surrounding erythema.           Laboratory Data:     Microbiology:  Culture   Date Value Ref Range Status   01/19/2025 No growth after 12 hours  Preliminary   01/19/2025 No growth after 12 hours  Preliminary   01/10/2025 <10,000 CFU/mL Urogenital louann  Final   01/07/2025 No Growth  Final   01/06/2025 No Growth  Final   01/03/2025 No Growth  Final   01/03/2025 No Growth  Final   12/10/2024 No Growth  Final   12/10/2024 No Growth  Final   12/10/2024 >100,000 CFU/mL Klebsiella pneumoniae (A)  Final   11/06/2024 No Growth  Final   10/11/2024 No Growth  Final   10/11/2024 50,000-100,000 CFU/mL Mixture of Urogenital Louann  Final   09/30/2024 No Growth  Final   09/22/2024 >100,000 CFU/mL Klebsiella oxytoca ESBL (A)  Final   09/22/2024 Positive on the 1st day of incubation (A)  Final   09/22/2024 Klebsiella oxytoca ESBL (AA)  Final     Comment:     2 of 2 bottles   09/22/2024 Pseudomonas aeruginosa (AA)  Final     Comment:     1 of 2 bottles   08/20/2024 >100,000 CFU/mL Klebsiella oxytoca ESBL (A)  Final     Culture Micro   Date Value Ref Range Status   08/30/2019 No growth  Final   08/29/2019 No growth  Final   08/28/2019 No growth  Final   08/28/2019 No growth  Final   08/27/2019 10,000 to 50,000 colonies/mL  Escherichia coli   (A)  Final   08/27/2019 (A)  Final    Cultured on the 1st day of incubation:  Escherichia coli  Susceptibility testing done on previous specimen     08/27/2019   Final    Critical Value/Significant Value, preliminary result only, called to and read back by   Maria Teresa Martines RN 08/27/2019 @1425 dk/hdp     08/26/2019 (A)  Final    Cultured on the 1st day of incubation:  Escherichia coli     08/26/2019   Final    Critical Value/Significant Value, preliminary result only, called to and read back by  NENO Robert at 1229 [id number].DK     08/26/2019   Final     (Note)  POSITIVE for E.COLI by Verigene multiplex nucleic acid test. Final  identification and antimicrobial susceptibility testing will be  verified by standard methods. Verigene test will not distinguish  E.coli from Shigella species including S.dysenteriae, S.flexneri,  S.boydii, and S.sonnei. Specimens containing Shigella species or  E.coli will be reported as Positive for E.coli.    Specimen tested with Verigene multiplex, gram-negative blood culture  nucleic acid test for the following targets: Acinetobacter sp.,  Citrobacter sp., Enterobacter sp., Proteus sp., E. coli, K.  pneumoniae/oxytoca, P. aeruginosa, and the following resistance  markers: CTXM, KPC, NDM, VIM, IMP and OXA.    Critical Value/Significant Value called to and read back by  Flower Lujan Rn @ 1449 8.27.19        09/27/2018 Moderate growth  Staphylococcus aureus   (A)  Final   07/30/2018   Final    50,000 to 100,000 colonies/mL  mixed urogenital louann  Susceptibility testing not routinely done     07/18/2016 (A)  Final    50,000 to 100,000 colonies/mL Klebsiella pneumoniae   05/21/2016 No growth  Final   05/21/2016 No growth  Final   05/16/2016 Canceled, Test credited Duplicate request  Final   05/16/2016 Canceled, Test credited Duplicate request  Final   05/16/2016 No growth  Final   05/16/2016 No growth  Final   05/13/2016 No growth after 29 days  Final   05/13/2016   Final    Canceled, Test credited Quantity not sufficient Notification of test cancellation   was given to MATTHEW FROM [facility], HE WILL REORDER AND RECOLLECT     05/13/2016 No growth  Final   05/13/2016 No growth  Final   05/13/2016   Final    10,000 to 50,000 colonies/mL mixed urogenital louann  Susceptibility testing not routinely done     05/13/2016 No growth  Final   09/25/2014 (A)  Final    Normal louann  Moderate growth Haemophilus influenzae Beta lactamase negative     09/03/2014 (A)  Final    Normal louann  Heavy growth Haemophilus influenzae Beta lactamase  negative  beta lactamase negative isolates are susceptible to ampicillin,   amoxacillin/clavulanic, levofloxacin and ceftriaxone     08/19/2014 No growth  Final   08/19/2014 No growth  Final   07/24/2014 No growth  Final   07/24/2014 No growth  Final   07/23/2014   Final    10,000 to 50,000 colonies/mL mixed urogenital louann   07/23/2014 No growth  Final   07/23/2014 No growth  Final   04/16/2014   Final    10,000 to 50,000 colonies/mL Mixed gram positive louann  Multiple species present, probable perineal contamination.     02/19/2014 No growth  Final   02/19/2014 No growth  Final   02/18/2014 No growth  Final   02/18/2014 No growth  Final   12/30/2013 No growth  Final   11/06/2013 No growth  Final   11/06/2013 No growth  Final   09/15/2013 No growth  Final   09/15/2013 No growth  Final   09/14/2013 No growth  Final   09/14/2013   Final    Cultured on the 1st day of incubation: Escherichia coli  Critical Value, preliminary result only, called to and read back by NENO THORNE 4E ON 09/15/13 @ 0255 BY DT  Gram stain review consistent with reported results.   09/14/2013   Final    Cultured on the 1st day of incubation: Escherichia coli  Critical Value, preliminary result only, called to and read back by NENO THORNE 4E ON 09/15/13 @ 0255 BY DT  Gram stain review consistent with reported results.  Susceptibility testing done on previous specimen   08/16/2013 No growth  Final   08/15/2013 No growth  Final   08/15/2013 No growth  Final   08/13/2013   Final    Cultured on the 1st day of incubation: Escherichia coli  Critical Value, preliminary result only, called to and read back by Kylah Alvares RN at 0435 on 08/14/13. hd  Susceptibility testing done on previous specimen   08/13/2013   Final    Cultured on the 1st day of incubation: Escherichia coli  Critical Value, preliminary result only, called to and read back by Geetha Bryant RN 6B 8/13/13 2355 dg  Called updated result to Flower Valencia RN @ 2000  8/14/13. NAP   09/27/2012   Final    >100,000 colonies/mL Mixed gram negative and positive louann  Multiple species present, probable perineal contamination.  Susceptibility testing not routinely done   10/27/2011 No growth  Final   10/24/2011 No growth  Final   10/11/2011   Final    <10,000 colonies/mL Gram positive cocci  <10,000 colonies/mL Strain 2 Gram positive cocci  No further identification  Susceptibility testing not routinely done   09/23/2011   Final    <10,000 colonies/mL Mixed gram negative and positive louann  Multiple species present, probable perineal contamination.   07/28/2011 50 to 100,000 colonies/mL Escherichia coli  Final   07/28/2011   Final    Canceled, Test credited  Quantity not sufficient  NOTIFIED GERMAN AT 1448,L   07/25/2011 No growth  Final   06/24/2011 50 to 100,000 colonies/mL Group D Enterococcus  Final   06/19/2011 No growth  Final   06/19/2011 No growth  Final   11/27/2010 >100,000 colonies/mL Escherichia coli  Final   01/12/2009 Normal louann  Final   01/12/2009   Final    Light growth Candida albicans / dubliniensis For non-significant sites, Candida     Comment:      albicans is not differentiated from Sandra dubliniensis. This is the   national   standard.  No additional fungi cultured after 4 weeks incubation   01/12/2009   Final    Culture received and in progress. Assayed at [employer]     Comment:      [place] [address]  Positive AFB results are called as soon as detected.  Final report to follow in 7 to 8 weeks.  Culture negative for acid fast bacilli   12/05/2008   Final    <10,000 colonies/mL Staphylococcus species Susceptibility testing not routinely     Comment:      done   12/05/2008 No acid fast bacilli isolated after 6 weeks  Final   12/05/2008 No growth  Final   11/13/2008 No acid fast bacilli isolated after 6 weeks  Final   11/12/2008 Moderate growth Group D Enterococcus  Final     Comment:     No VRE isolated   11/12/2008 No growth  Final    11/11/2008 <10,000 colonies/mL Mixed gram positive louann  Final     Comment:     Multiple species present, probable perineal contamination.   11/11/2008 No growth  Final   11/11/2008 No growth  Final   11/11/2008 No growth  Final   11/11/2008 No growth  Final   02/10/2008 No growth  Final   02/10/2008 No growth  Final   02/10/2008   Final    10 to 50,000 colonies/mL Mixed gram negative and positive louann     Comment:     Multiple species present, probable perineal contamination.   08/21/2007 No growth  Final   08/21/2007 No growth  Final   08/21/2007 Specimen not received  Final     Comment:     Canceled, Test credited   08/21/2007 No growth  Final   08/21/2007 Specimen not received Canceled, Test credited  Final   08/21/2007 No growth  Final   08/21/2007 No growth  Final   08/21/2007   Final    No Salmonella, Shigella, Campylobacter or E coli 0:157 isolated.   01/08/2007 No growth  Final   01/08/2007   Final    No Salmonella, Shigella, Campylobacter or E coli 0:157 isolated.   01/08/2007 No growth  Final   01/08/2007 No growth  Final   01/08/2007 No growth  Final   01/07/2007   Final    No Salmonella, Shigella, Campylobacter or E coli 0:157 isolated.   10/03/2006 >100,000 colonies/mL Escherichia coli  Final   10/05/2005 No Beta Streptococcus isolated  Final   10/05/2005   Final    <10,000 colonies/mL Mixed gram negative and positive louann     Comment:     Multiple species present, probable perineal contamination.  Susceptibility testing not routinely done   06/12/2005   Final    <10,000 colonies/mL Lactose  Susceptibility testing not routinely done   06/12/2005 No growth  Final       Inflammatory Markers    Recent Labs   Lab Test 08/26/19  2331 05/20/19  0957 07/30/18  0855 09/03/17  0912 09/02/17  0648 09/01/17  0832   SED 78* 47* 73*  --   --   --    .0* <2.9 5.2 64.0* 71.0* 51.0*       Metabolic Studies       Recent Labs   Lab Test 01/20/25  0930 01/20/25  0614 01/19/25  2304 01/19/25  1908  01/19/25  1808 01/13/25  1314 01/13/25  0705 01/12/25  0839 01/12/25  0705 01/06/25  2306 01/06/25  2302 12/11/24  0229 12/10/24  2148 12/10/24  1446 11/14/24  1506 08/26/19  2331 08/05/19  1552 05/14/18  1019 04/17/18  0942   NA  --  141  --   --  139  --  140  --  140   < >  --    < >  --    < > 140   < > 139   < > 138   POTASSIUM  --  4.2  --   --  3.9  --  4.5  --  4.1   < >  --    < >  --    < > 4.4   < > 2.9*   < > 3.9   CHLORIDE  --  108*  --   --  102  --  107  --  109*   < >  --    < >  --    < > 107   < > 99   < > 102   CO2  --  22  --   --  24  --  22  --  20*   < >  --    < >  --    < > 21*   < > 30   < > 28   ANIONGAP  --  11  --   --  13  --  11  --  11   < >  --    < >  --    < > 12   < > 10   < > 8   BUN  --  35.0*  --   --  33.7*  --  35.8*  --  36.9*   < >  --    < >  --    < > 42.1*   < > 36*   < > 36*   CR  --  1.61*  --   --  1.80*  --  1.44*  --  1.41*   < >  --    < >  --    < > 1.56*   < > 1.41*   < > 1.74*   GFRESTIMATED  --  33*  --   --  29*  --  38*  --  39*   < >  --    < >  --    < > 34*   < > 38*   < > 29*   GLC 93 128* 158*  --  114*   < > 97   < > 121*   < >  --    < >  --    < > 136*   < > 269*   < > 125*   A1C  --   --   --   --   --   --   --   --   --   --   --   --  6.9*  --   --    < >  --    < >  --    KEILY  --  8.6*  --   --  8.9  --  9.1  --  8.9   < >  --    < >  --    < > 9.6   < > 8.5   < > 9.1   PHOS  --   --   --   --   --   --   --   --   --   --   --   --   --   --  3.8  --  3.3  --   --    MAG  --   --   --   --   --   --   --   --   --   --   --   --   --   --   --   --  2.2  --  2.8*   LACT  --   --   --  1.0  --   --   --   --   --   --  1.4   < >  --    < >  --    < >  --   --   --     < > = values in this interval not displayed.       Hepatic Studies    Recent Labs   Lab Test 01/19/25  1808 01/13/25  1410 01/06/25  2306 01/03/25  1733 12/14/24  0743   BILITOTAL 0.4 0.2 0.3 0.4 <0.2   ALKPHOS 113 103 149 108 106   ALBUMIN 3.4* 3.6 3.5 3.5 2.8*   AST 32 19  --  22  "31   ALT 29 24  --  26 66*       Pancreatitis testing    Recent Labs   Lab Test 01/03/25  1733 12/10/24  1446 09/04/24  1008 09/18/20  0000 08/26/19  2331 05/20/19  0957 12/28/18  0000 10/16/18  0902 05/18/18  0731   LIPASE 29 33  --   --  66*  --   --   --   --    TRIG  --   --  329* 432  --  146 339* 269* 557*       Hematology Studies      Recent Labs   Lab Test 01/20/25  0614 01/19/25  1808 01/13/25  0705 01/12/25  0705 01/11/25  0931 01/10/25  0641 01/04/25  0742 01/03/25  1733 10/11/24  2212 09/30/24  1545 08/27/19  0532 08/26/19  2331 05/19/18  0629 05/18/18  0731 09/03/17  0912 09/02/17  0648 09/01/17  0832   WBC 7.0 10.2 6.6 6.1 7.0 6.6   < > 9.2   < > 8.2   < > 12.5*   < > 8.0   < > 10.2 9.4   ANEU  --   --   --   --   --   --   --  6.9  --  3.7  --  10.8*  --  5.0  --  7.4 6.5   ALYM  --   --   --   --   --   --   --  1.0  --  2.7  --  0.8  --  1.9  --  1.7 1.7   KATHY  --   --   --   --   --   --   --  1.0  --  1.0  --  0.7  --  1.0  --  0.9 1.0   AEOS  --   --   --   --   --   --   --  0.0  --  0.2  --  0.0  --  0.1  --  0.2 0.1   HGB 8.7* 10.3* 8.9* 8.2* 9.2* 8.5*   < > 10.2*   < > 11.1*   < > 10.5*   < > 11.6*   < > 10.5* 11.1*   HCT 28.7* 32.2* 29.1* 26.4* 29.4* 27.6*   < > 32.2*   < > 35.0   < > 33.0*   < > 36.0   < > 34.0* 35.0    397 376 361 377 340   < > 418   < > 388   < > 226   < > 324   < > 259 269    < > = values in this interval not displayed.       Arterial Blood Gas Testing  No lab results found.     Urine Studies     Recent Labs   Lab Test 01/19/25  2100 01/07/25  1950 01/03/25  1934 12/10/24  1739 11/06/24  1048 10/14/24  1244   URINEPH 6.0 5.5 5.5 5.5 5.5 5.5   NITRITE Negative Negative Negative Negative Negative Negative   LEUKEST Trace* Negative Trace* Large* Trace* Negative   WBCU 7* 2 <1 >182*  --  5       Vancomycin Levels     No lab results found.    Invalid input(s): \"VANCO\"    Tobramycin levels     No lab results found.    Gentamicin levels    No lab results found.    CSF " testing   No lab results found.    Hepatitis B Testing No lab results found.    Hepatitis C Testing     Hepatitis C Antibody   Date Value Ref Range Status   05/13/2016  NR Final    Nonreactive   Assay performance characteristics have not been established for newborns,   infants, and children     03/26/2012 Negative NEG Final         Last check of C difficile  C Diff Toxin B PCR   Date Value Ref Range Status   05/17/2012   Final    Negative: Clostridium difficile target DNA sequences NOT detected, presumed   negative for Clostridium difficile toxin B or the number of bacteria present   may be below the limit of detection for the test.   FDA approved assay performed using [employer]pert real-time PCR.   A negative result does not exclude actual disease due to Clostridium difficile   and may be due to improper collection, handling and storage of the specimen or   the number of organisms in the specimen is below the detection limit of the   assay.              Imaging:   CXR 1/19/25  IMPRESSION: Stable calcifications right base. Implanted cardiac monitor. No acute infiltrate or significant change.

## 2025-01-20 NOTE — PROGRESS NOTES
[facility]    Medicine Progress Note - Hospitalist Service, GOLD TEAM     Date of Admission:  1/19/2025    Assessment & Plan   Luz Thompson is a 74 year old female admitted on 1/19/2025.  Medical history notable for primary biliary cirrhosis s/p liver transplant (2002) on chronic immunosuppression, atrial fibrillation, history of CVA, CKD stage III, diabetes mellitus type 2, hypothyroidism, hypertension, PAD.  Presented to emergency department for shortness of breath, hypoxia, fever.     Updates Today:  - obtain x-ray of left hip  - creatinine improved to 1.61 (previously 1.80)  - follow transplant ID recs -> monitor off antimicrobials, additional lab testing (below)   - follow PT/OT recs -> home with home PT and OT; home with assist  - O2 saturation remained ~ 93% on RA while working with PT   - urine culture with <10,000 CFU/mL Mixture of Urogenital Rashmi     Hypoxia, Shortness of breath  Fever  Multiple recent hospitalizations   Hx of coccidioidomycosis (2017)  Previous history of fungal infections while on immunosuppression. Recently hospitalized 12/10 to 12/15 for sepsis due to UTI, suspected pyelonephritis.  Subsequently was hospitalized between 1/3-1/5, and 1/6 to 1/14 for, URI, aspiration pneumonia vs HAP.  Patient returned to emergency department on 1/19 for concern of shortness of breath, hypoxia, fever.  Tmax at home 101.8.  EMS found her to be hypotensive 80/55.  No clear source of infection.  Reports off/on fever since recent discharge on 1/14.  In emergency department ,WBC 10.2, Covid/flu negative,  No acute findings on chest x-ray.  Blood cultures and UA ordered.  Current temperature 98.2 and hemodynamically stable.  When assessed in the emergency department, able to maintain saturations in 90s on room air. UA with light yellow urine, trace leukocyte, negative nitrite. Respiratory panel negative (COVID, flu A and B, RSV). O2 saturation remained ~ 93%  on RA while working with PT. Urine culture <10,000 CFU/mL Mixture of Urogenital Rashmi.   - follow blood cultures 1/19: in process   - transplant infectious disease consulted, appreciate assistance    - monitor off antimicrobials   - family bring in home thermometer to endure reading accuracy     - x-ray of left hip/pelvis    - additional labs: BD glucan, AFB blood culture, coccidioides antigen, CrAg, CMV PCR, EBV PCR, enteric panel, HIV, LDH, parvo Ab and PCR, QuantGold, toxoplasma serologies, treponema Ab     Left hip pain   Patient reports left hip pain over the past week.  Does not radiate towards groin.  Worse with ambulation.  Tender to palpation over left trochanter.  Reports history of OA in other joints.   - obtain XR of left hip     Concern for possible deconditioning  Possible difficulty performing ADLs  Patient lives at independent living.  Patient and mother concerned with patient performing ADLs with current hip pain and shortness of breath.   - PT/OT consulted, appreciate assistance   - home with home PT and OT; home with assist   - social work consulted, appreciate assistance     Hx of primary biliary cirrhosis s/p liver transplant (2002)  Had liver transplant in 2002, unrelated living donor.  Last seen by Dr. Gentry Ramirez 10/2024.   - continue PTA prednisone, sirolimus, ursodiol  - follow up with transplant GI as previously scheduled     DERREK on CKD stage 3b - improving  Has yet to establish with nephrologist in [place] since moving from [place].  Reports has an appointment in April to establish care.. Baseline creatinine appears to be approximately 1.4.  Upon arrival creatinine 1.80.  Suspected to be secondary to volume depletion.  BMP on 1/20 showing improved  creatinine down to 1.61.   - IVF  - continue to monitor     HTN  Hx of paroxysmal atrial fibrillation  HLD  BP 98/51 this AM.   - hold PTA amlodipine   - continue PTA metoprolol with hold parameters   - continue PTA apixaban  - continue PTA  "Ezetimibe     DM type 2  Diabetic peripheral neuropathy  Last A1c 6.9 on 12/10/2024.  PTA linagliptin and gabapentin for neuropathy.  - hold PTA linagliptin  - continue PTA gabapentin  - POCT glucose monitoring  - sliding scale insulin   - hypoglycemia protocol      Hypothyroidism  Last TSH 3.31 on 11/14/2024.  - continue PTA synthroid         Diet: Combination Diet Regular Diet Adult    DVT Prophylaxis: DOAC  Ron Catheter: Not present  Lines: None     Cardiac Monitoring: None  Code Status: Full Code      Clinically Significant Risk Factors Present on Admission          # Hyperchloremia: Highest Cl = 108 mmol/L in last 2 days, will monitor as appropriate      # Hypocalcemia: Lowest Ca = 8.6 mg/dL in last 2 days, will monitor and replace as appropriate     # Hypoalbuminemia: Lowest albumin = 3.4 g/dL at 1/19/2025  6:08 PM, will monitor as appropriate  # Drug Induced Coagulation Defect: home medication list includes an anticoagulant medication    # Hypertension: Noted on problem list      # Anemia: based on hgb <11      # DMII: A1C = 6.9 % (Ref range: <5.7 %) within past 6 months    # Overweight: Estimated body mass index is 29.32 kg/m  as calculated from the following:    Height as of this encounter: 1.715 m (5' 7.5\").    Weight as of this encounter: 86.2 kg (190 lb).              Social Drivers of Health    Housing Stability: Low Risk  (1/7/2025)    Housing Stability     Do you have housing? : Yes     Are you worried about losing your housing?: No   Recent Concern: Housing Stability - High Risk (10/12/2024)    Housing Stability     Do you have housing? : No     Are you worried about losing your housing?: No   Tobacco Use: Medium Risk (1/2/2025)    Patient History     Smoking Tobacco Use: Former     Smokeless Tobacco Use: Never   Physical Activity: Insufficiently Active (11/9/2023)    Received from [facility]    Exercise Vital Sign     Days of Exercise per Week: 5 days     Minutes of Exercise per Session: 10 min " "         Disposition Plan     Medically Ready for Discharge: Anticipated in 2-4 Days           The patient's care was discussed with the Attending Physician, Dr. Beyer and Patient.    Katherine Luna NP  Hospitalist Service, [facility]  Securely message with [employer] (more info)  Text page via [facility] Paging/Directory   See signed in provider for up to date coverage information  ______________________________________________________________________    Interval History   Virginia was seen sitting up in bed, on 2L O2 via NC and satting 100%. No acute events overnight. Continues to have non-productive cough since her recent admission for pneumonia. She was told that it may take her awhile to \"recover\" from this given her immunosuppression. No other acute concerns at this time. We discuss the plan to have her work with PT/OT, obtain x-ray of left hip, and to have the infectious disease team assist. Able to wean off O2 during interview/exam. All questions and concerns addressed at this time.     Physical Exam   Vital Signs: Temp: 97.7  F (36.5  C) Temp src: Oral BP: 98/51 Pulse: 64   Resp: 16 SpO2: 100 % O2 Device: Nasal cannula Oxygen Delivery: 2 LPM  Weight: 190 lbs 0 oz    GENERAL: Alert and awake. Answering questions appropriately. Oriented x 3. NAD. Pleasant and conversational   HEENT: Anicteric sclera. EOMI. Mucous membranes moist   CARDIOVASCULAR: RRR. S1, S2. No murmurs, rubs, or gallops.   RESPIRATORY: Effort normal on 2L O2 however weaned to RA during interview/exam. Clear to auscultation bilaterally, no rales, rhonchi or wheezes  GI: Abdomen soft, non-tender abdomen without rebound or guarding, normoactive bowel sounds present  MUSCULOSKELETAL: Moves all extremities.   EXTREMITIES: No peripheral edema. No calf asymmetry, erythema, or tenderness.   NEUROLOGICAL: No focal deficits. Moving all extremities symmetrically.   SKIN: Intact. Warm and dry. " No jaundice. No rashes on exposed skin   PSYCH: Affect appropriate     Medical Decision Making       60 MINUTES SPENT BY ME on the date of service doing chart review, history, exam, documentation & further activities per the note.      Data   Imaging results reviewed over the past 24 hrs:   Recent Results (from the past 24 hours)   XR Chest 2 Views    Narrative    EXAM: XR CHEST 2 VIEWS  LOCATION: [facility]  DATE: 1/19/2025    INDICATION: Hypoxia, shortness of breath and fever  COMPARISON: 1/6/2025      Impression    IMPRESSION: Stable calcifications right base. Implanted cardiac monitor. No acute infiltrate or significant change.     Recent Labs   Lab 01/20/25  0614 01/19/25  2304 01/19/25  1808 01/13/25  2136 01/13/25  1410   WBC 7.0  --  10.2  --   --    HGB 8.7*  --  10.3*  --   --    MCV 91  --  88  --   --      --  397  --   --      --  139  --   --    POTASSIUM 4.2  --  3.9  --   --    CHLORIDE 108*  --  102  --   --    CO2 22  --  24  --   --    BUN 35.0*  --  33.7*  --   --    CR 1.61*  --  1.80*  --   --    ANIONGAP 11  --  13  --   --    KEILY 8.6*  --  8.9  --   --    * 158* 114*   < >  --    ALBUMIN  --   --  3.4*  --  3.6   PROTTOTAL  --   --  6.4  --  6.3*   BILITOTAL  --   --  0.4  --  0.2   ALKPHOS  --   --  113  --  103   ALT  --   --  29  --  24   AST  --   --  32  --  19    < > = values in this interval not displayed.

## 2025-01-20 NOTE — PLAN OF CARE
Goal Outcome Evaluation:      Plan of Care Reviewed With: patient    Overall Patient Progress: no changeOverall Patient Progress: no change     Shift: [id number]    VS: Stable on 2L NC  Neuro: A&Ox4, [place] w both hearing aids present, but batteries dead throughout shift  Cardiac: WDL denies chest pain  Resp: WDL denies SOB  GI/: x1 episode nausea w no vomitting, Tums given to treat. x1 Bm this shift. Voiding via purewick.   Diet/Appetite: Reg diet tolerating fairly  Activity: Up OOB x1 w PT, otherwise in bed most of shift.   Pain: pt endorsing pain in L hip w movement, movement minimized.  Skin: No new deficits noted.  LDA's: x1 PIV SL  Labs: BG monitored, no insulin given, at some points dt pt refusal. See MAR for details.     New changes this shift: Various labs taken throughout day to test for infections, pt cooperative, all but lab needing stool sample obtained. Added Omega 3 supplement based on pt's home meds, and added Ensure based on pt's home nutritional needs. Xray pelvis/hip taken today. No acute changes.

## 2025-01-20 NOTE — PROGRESS NOTES
[facility] Medicine Cross Cover Note    January 20, 2025 5:54 PM    I was notified by RN Tila that this patient had 1 of 2 BC bottles positive for GPCs.         Action taken:   -order additional set of blood cultures-high suspicion that this is a skin louann contaminant - vital signs reviewed, ID note reviewed, day team medicine note reviewed. Avoided empiric antibiotics at this time due to: ID team preference, only 1 of 2 positive BC bottles, no fever, no hypotension, no hypoxia, no tachycardia. If vital signs or clinical status changes, if 2nd BC returns positive, or if 1st culture speciation is not consistent with skin louann, would then start antibiotic.    Communicated plan via secure messaging.     I spent 15 minutes on the date of the encounter doing chart review, history and exam, documentation and further activities noted above.       LIZ Weinstein CNP on 1/20/2025 at 5:54 PM

## 2025-01-20 NOTE — MEDICATION SCRIBE - ADMISSION MEDICATION HISTORY
Medication Scribe Admission Medication History    Admission medication history is complete. The information provided in this note is only as accurate as the sources available at the time of the update.    Information Source(s): Patient via in-person    Pertinent Information: Virginia did not have one of her hearing aids and could not hear me very well with the mask on. She reports her medications have not changed since  discharge on Tuesday 01/14/25. She mentioned taking her medications around 3 pm before coming to the ED but did not say which ones. Dispense report and outside medication reconciliation list have been reviewed.     Changes made to PTA medication list:  Added: None  Deleted: None  Changed: None    Allergies reviewed with patient and updates made in EHR: no    Medication History Completed By: Israel Desir 1/19/2025 9:15 PM    PTA Med List   Medication Sig Last Dose/Taking    amLODIPine (NORVASC) 5 MG tablet Take 0.5 tablets (2.5 mg) by mouth daily. Unknown    apixaban ANTICOAGULANT (ELIQUIS ANTICOAGULANT) 2.5 MG tablet Take 1 tablet (2.5 mg) by mouth 2 times daily. Unknown    calcitRIOL (ROCALTROL) 0.25 MCG capsule Take 1 capsule (0.25 mcg) by mouth daily. Unknown    Continuous Glucose Sensor (FREESTYLE NINO 2 SENSOR) MISC Change every 14 days. Unknown    D-MANNOSE PO Take 1 Scoop by mouth 2 times daily. Unknown    EPINEPHrine (ANY BX GENERIC EQUIV) 0.3 MG/0.3ML injection 2-pack Inject 0.3 mLs (0.3 mg) into the muscle as needed for anaphylaxis. May repeat one time in 5-15 minutes if response to initial dose is inadequate. Unknown    estradiol (ESTRACE) 0.1 MG/GM vaginal cream Place vaginally every other day. Unknown    evolocumab (REPATHA SURECLICK) 140 MG/ML prefilled autoinjector Inject 1 mL (140 mg) subcutaneously every 14 days. . Please have fasting labs drawn for further refills. Unknown    ezetimibe (ZETIA) 10 MG tablet Take 1 tablet (10 mg) by mouth daily. Unknown    Ferrous Sulfate 324 MG  TBEC Take 1 tablet by mouth 2 times daily as needed. Unknown    folic acid (FOLVITE) 1 MG tablet Take 1 tablet (1 mg) by mouth daily. Unknown    gabapentin (NEURONTIN) 250 MG/5ML solution Take 6 mLs (300 mg) by mouth at bedtime Unknown    glucose (BD GLUCOSE) 5 g chewable tablet Take 2 tablets (10 g) by mouth as needed (low blood sugar) Unknown    hypromellose (ARTIFICIAL TEARS) 0.4 % SOLN ophthalmic solution Apply 1 drop to eye every hour as needed for dry eyes Unknown    ketoconazole (NIZORAL) 2 % external cream Apply topically 2 times daily as needed (redness and flaking). Apply to the face. Unknown    ketoconazole (NIZORAL) 2 % external shampoo Apply topically three times a week. Lather in the shower, wait 3-5 minutes before rinsing. Unknown    levothyroxine (SYNTHROID/LEVOTHROID) 175 MCG tablet Take 1 tablet (175 mcg) by mouth daily. Unknown    linagliptin (TRADJENTA) 5 MG TABS tablet Take 1 tablet (5 mg) by mouth daily. Unknown    meclizine (ANTIVERT) 25 MG tablet Take 1 tablet (25 mg) by mouth as needed for dizziness or other (migraines) Unknown    metoprolol succinate ER (TOPROL XL) 25 MG 24 hr tablet Take 1 tablet (25 mg) by mouth daily. Take an extra tablet daily as needed for breakthrough afib. May repeat in two hours if heart rate remains >100bpm (no more than 4 tablets per day). Unknown    Multiple Vitamins-Minerals (PRESERVISION AREDS 2) CAPS Take 1 capsule by mouth 2 times daily Unknown    Nutrisource Fiber PO packet Take 1 packet by mouth daily. Unknown    omega-3 acid ethyl esters (LOVAZA) 1 g capsule Take 1 capsule by mouth 2 times daily Unknown    predniSONE (DELTASONE) 10 MG tablet Take 1 tablet (10 mg) by mouth daily. Unknown    sirolimus (GENERIC EQUIVALENT) 1 MG tablet Take 1 tablet (1 mg) by mouth daily. Unknown    ursodiol (ACTIGALL) 250 MG tablet Take 1 tablet (250 mg) by mouth 2 times daily. Unknown

## 2025-01-20 NOTE — PROGRESS NOTES
01/20/25 1038   Appointment Info   Signing Clinician's Name / Credentials (PT) Evita Diana, PT, DPT   Living Environment   People in Home alone   Current Living Arrangements independent living facility   Home Accessibility no concerns   Transportation Anticipated family or friend will provide   Living Environment Comments Her facility will provide FDC services as needed; currently her daughter and granddaughter provide help with cleaning and showering. Meals are provided at her facility 1-2x/day.   Self-Care   Usual Activity Tolerance moderate   Current Activity Tolerance fair   Regular Exercise No   Equipment Currently Used at Home cane, straight;walker, rolling   Fall history within last six months no   Activity/Exercise/Self-Care Comment Typcially does not have an issue with mobility at her apartment. Uses her cane inside and her walker for longer distances. Pain in her hip has been worse the past month and she has been self-medicating with CBD and tylenol.   General Information   Onset of Illness/Injury or Date of Surgery 01/19/25   Referring Physician Kane Meyer PA-C   Patient/Family Therapy Goals Statement (PT) to go home.   Pertinent History of Current Problem (include personal factors and/or comorbidities that impact the POC) Luz Thompson is a 74 year old female admitted on 1/19/2025.  Medical history notable for primary biliary cirrhosis s/p liver transplant (2002) on chronic immunosuppression, atrial fibrillation, history of CVA, CKD stage III, diabetes mellitus type 2, hypothyroidism, hypertension, PAD.  Presented to emergency department for shortness of breath, hypoxia, fever   Existing Precautions/Restrictions fall   Cognition   Affect/Mental Status (Cognition) WFL   Pain Assessment   Patient Currently in Pain Yes, see Vital Sign flowsheet   Integumentary/Edema   Integumentary/Edema no deficits were identifed   Posture    Posture Forward head position;Protracted shoulders    Range of Motion (ROM)   ROM Comment RUE ROM is limited due to long-standing rotator cuff issues. Otherwise, ROM is WFL.   Strength (Manual Muscle Testing)   Strength Comments Overall grossly deconditioned. Able to lift all four extremities anti-gravity but does demonstrate decreased activity tolerance.   Bed Mobility   Comment, (Bed Mobility) SBA   Transfers   Comment, (Transfers) SBA   Gait/Stairs (Locomotion)   Comment, (Gait/Stairs) Very slow gait speed with moderate reliance on walker/UE support for balance.   Balance   Balance Comments Good safety awareness but is a high fall risk, especially without UE support.   Clinical Impression   Criteria for Skilled Therapeutic Intervention Yes, treatment indicated   PT Diagnosis (PT) impaired mobility   Influenced by the following impairments weakness; decreased balance; pain   Functional limitations due to impairments bed mobility; transfers and gait   Clinical Presentation (PT Evaluation Complexity) stable   Clinical Presentation Rationale clinical judgement   Clinical Decision Making (Complexity) low complexity   Planned Therapy Interventions (PT) balance training;bed mobility training;gait training;home exercise program;neuromuscular re-education;strengthening;transfer training   Risk & Benefits of therapy have been explained evaluation/treatment results reviewed;care plan/treatment goals reviewed;risks/benefits reviewed   PT Total Evaluation Time   PT Eval, Low Complexity Minutes (83498) 10   Physical Therapy Goals   PT Frequency Daily   PT Predicted Duration/Target Date for Goal Attainment 02/11/25   PT Goals Bed Mobility;Transfers;Gait   PT: Bed Mobility Independent   PT: Transfers Modified independent   PT: Gait Modified independent;Rolling walker;50 feet   Therapeutic Activity   Therapeutic Activities: dynamic activities to improve functional performance Minutes (35340) 25   Symptoms Noted During/After Treatment Increased pain;Shortness of breath   Treatment  Detail/Skilled Intervention Patient given cues for log rolling initially, but she reports she has an adjustable bed at home and so is able to sit up and pull herself up out of bed. Cues given again for safe hand placement during transfers and to keep the walker close with ambulation. Slow gait speed noted with shuffling steps but overall demonstrates good safety awareness and is able to ambulate far enough to get around her apartment at home. O2 sats stayed at about 93% on room air throughout, even with ambulation. Patient did note some SOB. Left at edge of bed with OT in the room to work with her.   PT Discharge Planning   PT Plan progress activity as able   PT Discharge Recommendation (DC Rec) home with home care physical therapy;home with assist   PT Rationale for DC Rec Patient reports that home care was supposed to begin after her last hospital stay and she would like to do that now. She has a good support system from her family and services available at her apartment complex should she need them.   PT Brief overview of current status A x 1 with walker   PT Total Distance Amb During Session (feet) 40   Physical Therapy Time and Intention   Timed Code Treatment Minutes 25   Total Session Time (sum of timed and untimed services) 35

## 2025-01-20 NOTE — PROGRESS NOTES
01/20/25 1140   Appointment Info   Signing Clinician's Name / Credentials (OT) Nila Roddy, OTR/L   Living Environment   People in Home alone   Current Living Arrangements independent living facility   Home Accessibility no concerns   Transportation Anticipated family or friend will provide   Living Environment Comments Pt reports living in IL[facility] facility where she receives assist for meals only. Per pt, could receive assist for correction services as needed.   Self-Care   Usual Activity Tolerance moderate   Current Activity Tolerance fair   Regular Exercise No   Equipment Currently Used at Home cane, straight;walker, rolling;grab bar, toilet;grab bar, tub/shower;shower chair   Fall history within last six months no   Number of times patient has fallen within last six months 0   Activity/Exercise/Self-Care Comment Pt reports typically IND/Mod I with dressing, toileting, g/h. Reports family assists with showering.   Instrumental Activities of Daily Living (IADL)   IADL Comments Pt reports receiving meals from ILF. Reports family assists with cleaning/laundry.   General Information   Onset of Illness/Injury or Date of Surgery 01/19/25   Referring Physician Kane Meyer PA-C   Patient/Family Therapy Goal Statement (OT) To get more arm strength   Additional Occupational Profile Info/Pertinent History of Current Problem Per chart: 74 year old female admitted on 1/19/2025.  Medical history notable for primary biliary cirrhosis s/p liver transplant (2002) on chronic immunosuppression, atrial fibrillation, history of CVA, CKD stage III, diabetes mellitus type 2, hypothyroidism, hypertension, PAD.  Presented to emergency department for shortness of breath, hypoxia, fever   Existing Precautions/Restrictions fall   Limitations/Impairments hearing  (wearings hearing aids)   Left Upper Extremity (Weight-bearing Status) full weight-bearing (FWB)   Right Upper Extremity (Weight-bearing Status) full weight-bearing (FWB)   Left  Lower Extremity (Weight-bearing Status) full weight-bearing (FWB)   Right Lower Extremity (Weight-bearing Status) full weight-bearing (FWB)   General Observations and Info Activity Up ad ashley   Cognitive Status Examination   Orientation Status orientation to person, place and time   Cognitive Status Comments Pt appears grossly cognitively intact throughout obs/interview.   Visual Perception   Visual Impairment/Limitations WFL   Sensory   Sensory Quick Adds sensation intact   Pain Assessment   Patient Currently in Pain Yes, see Vital Sign flowsheet  (reports hip pain, does not rate)   Posture   Posture protracted shoulders   Range of Motion Comprehensive   General Range of Motion upper extremity range of motion deficits identified   General Upper Extremity Assessment (Range of Motion)   Comment: Upper Extremity ROM AROM shoulder flex/abd only to 90, reports previous rotator cuff tear 2-3 years ago   Strength Comprehensive (MMT)   Comment, General Manual Muscle Testing (MMT) Assessment Not formally assessed, demonstrates   Muscle Tone Assessment   Muscle Tone Quick Adds No deficits were identified   Coordination   Upper Extremity Coordination No deficits were identified   Bed Mobility   Bed Mobility No deficits identified   Transfers   Transfer Comments Ax1   Balance   Balance Comments Ax1   Activities of Daily Living   BADL Assessment/Intervention bathing;lower body dressing;grooming;toileting;upper body dressing   Bathing Assessment/Intervention   [place] Level (Bathing) minimum assist (75% patient effort)   Comment, (Bathing) Per clinical reasoning   Upper Body Dressing Assessment/Training   Comment, (Upper Body Dressing) donning sweater   [place] Level (Upper Body Dressing) minimum assist (75% patient effort)   Lower Body Dressing Assessment/Training   [place] Level (Lower Body Dressing) independent   Grooming Assessment/Training   [place] Level (Grooming) set up   Comment, (Grooming) sitting  EOB   Toileting   Comment, (Toileting) Per clinical reasoning   [place] Level (Toileting) supervision   Clinical Impression   Criteria for Skilled Therapeutic Interventions Met (OT) Yes, treatment indicated   OT Diagnosis decreased safety/engagement in ADLs   OT Problem List-Impairments impacting ADL problems related to;activity tolerance impaired;balance;pain;strength;range of motion (ROM)   Assessment of Occupational Performance 1-3 Performance Deficits   Identified Performance Deficits dressing, toileting, functional mobility   Planned Therapy Interventions (OT) ADL retraining;balance training;transfer training;strengthening;ROM;home program guidelines;progressive activity/exercise   Clinical Decision Making Complexity (OT) problem focused assessment/low complexity   Risk & Benefits of therapy have been explained evaluation/treatment results reviewed;care plan/treatment goals reviewed;risks/benefits reviewed;current/potential barriers reviewed;participants voiced agreement with care plan;participants included;patient   Clinical Impression Comments Pt will benefit from continued skilled IP OT services to progress IND and safety with ADLs.   OT Total Evaluation Time   OT Eval, Low Complexity Minutes (75498) 8   OT Goals   Therapy Frequency (OT) 3 times/week   OT Predicted Duration/Target Date for Goal Attainment 01/27/25   OT Goals Hygiene/Grooming;Toilet Transfer/Toileting;OT Goal 1   OT: Hygiene/Grooming modified independent   OT: Toilet Transfer/Toileting Modified independent   OT: Goal 1 Pt will IND complete BUE HEP to progress functional strength/endurance for ADLs.   OT Discharge Planning   OT Plan review BUE HEP, toileting   OT Discharge Recommendation (DC Rec) home with assist;home with home care occupational therapy   OT Rationale for DC Rec Pt below baseline, limited by decreased activity tolerance. Rec d/c to home with  OT To progress IND and safety with ADLs within home environment.   OT Brief  overview of current status Ax1 with 4WW   OT Total Distance Amb During Session (feet) 0   Total Session Time   Total Session Time (sum of timed and untimed services) 8

## 2025-01-21 LAB
ALBUMIN UR-MCNC: 30 MG/DL
ANION GAP SERPL CALCULATED.3IONS-SCNC: 11 MMOL/L (ref 7–15)
APPEARANCE UR: ABNORMAL
BILIRUB UR QL STRIP: NEGATIVE
BUN SERPL-MCNC: 37.6 MG/DL (ref 8–23)
CALCIUM SERPL-MCNC: 9 MG/DL (ref 8.8–10.4)
CHLORIDE SERPL-SCNC: 110 MMOL/L (ref 98–107)
CMV DNA SPEC NAA+PROBE-ACNC: NOT DETECTED IU/ML
COLOR UR AUTO: ABNORMAL
CREAT SERPL-MCNC: 1.56 MG/DL (ref 0.51–0.95)
EBV DNA SERPL NAA+PROBE-ACNC: NOT DETECTED IU/ML
EGFRCR SERPLBLD CKD-EPI 2021: 34 ML/MIN/1.73M2
ERYTHROCYTE [DISTWIDTH] IN BLOOD BY AUTOMATED COUNT: 15.7 % (ref 10–15)
GLUCOSE BLDC GLUCOMTR-MCNC: 111 MG/DL (ref 70–99)
GLUCOSE BLDC GLUCOMTR-MCNC: 164 MG/DL (ref 70–99)
GLUCOSE BLDC GLUCOMTR-MCNC: 164 MG/DL (ref 70–99)
GLUCOSE BLDC GLUCOMTR-MCNC: 207 MG/DL (ref 70–99)
GLUCOSE SERPL-MCNC: 133 MG/DL (ref 70–99)
GLUCOSE UR STRIP-MCNC: NEGATIVE MG/DL
HCO3 SERPL-SCNC: 21 MMOL/L (ref 22–29)
HCT VFR BLD AUTO: 28.1 % (ref 35–47)
HGB BLD-MCNC: 8.3 G/DL (ref 11.7–15.7)
HGB UR QL STRIP: ABNORMAL
KETONES UR STRIP-MCNC: NEGATIVE MG/DL
LEUKOCYTE ESTERASE UR QL STRIP: ABNORMAL
MCH RBC QN AUTO: 26.9 PG (ref 26.5–33)
MCHC RBC AUTO-ENTMCNC: 29.5 G/DL (ref 31.5–36.5)
MCV RBC AUTO: 91 FL (ref 78–100)
MUCOUS THREADS #/AREA URNS LPF: PRESENT /LPF
NITRATE UR QL: NEGATIVE
PH UR STRIP: 5.5 [PH] (ref 5–7)
PLATELET # BLD AUTO: 351 10E3/UL (ref 150–450)
POTASSIUM SERPL-SCNC: 4 MMOL/L (ref 3.4–5.3)
QUANTIFERON MITOGEN: 10 IU/ML
QUANTIFERON NIL TUBE: 0.16 IU/ML
QUANTIFERON TB1 TUBE: 0.15 IU/ML
QUANTIFERON TB2 TUBE: 0.16
RBC # BLD AUTO: 3.08 10E6/UL (ref 3.8–5.2)
RBC URINE: 6 /HPF
SODIUM SERPL-SCNC: 142 MMOL/L (ref 135–145)
SP GR UR STRIP: 1.01 (ref 1–1.03)
SPECIMEN TYPE: NORMAL
SQUAMOUS EPITHELIAL: 1 /HPF
T PALLIDUM AB SER QL: NONREACTIVE
TRANSITIONAL EPI: <1 /HPF
UROBILINOGEN UR STRIP-MCNC: NORMAL MG/DL
WBC # BLD AUTO: 6.7 10E3/UL (ref 4–11)
WBC URINE: >182 /HPF

## 2025-01-21 PROCEDURE — 250N000013 HC RX MED GY IP 250 OP 250 PS 637: Performed by: NURSE PRACTITIONER

## 2025-01-21 PROCEDURE — 85027 COMPLETE CBC AUTOMATED: CPT

## 2025-01-21 PROCEDURE — 87086 URINE CULTURE/COLONY COUNT: CPT

## 2025-01-21 PROCEDURE — 99233 SBSQ HOSP IP/OBS HIGH 50: CPT | Mod: FS

## 2025-01-21 PROCEDURE — 82565 ASSAY OF CREATININE: CPT

## 2025-01-21 PROCEDURE — 99207 PR NO BILLABLE SERVICE THIS VISIT: CPT | Performed by: NURSE PRACTITIONER

## 2025-01-21 PROCEDURE — 99233 SBSQ HOSP IP/OBS HIGH 50: CPT | Mod: GC | Performed by: STUDENT IN AN ORGANIZED HEALTH CARE EDUCATION/TRAINING PROGRAM

## 2025-01-21 PROCEDURE — 250N000012 HC RX MED GY IP 250 OP 636 PS 637

## 2025-01-21 PROCEDURE — 250N000013 HC RX MED GY IP 250 OP 250 PS 637

## 2025-01-21 PROCEDURE — 81003 URINALYSIS AUTO W/O SCOPE: CPT

## 2025-01-21 PROCEDURE — 80048 BASIC METABOLIC PNL TOTAL CA: CPT

## 2025-01-21 PROCEDURE — 99207 PR APP CREDIT; MD BILLING SHARED VISIT: CPT | Mod: FS | Performed by: STUDENT IN AN ORGANIZED HEALTH CARE EDUCATION/TRAINING PROGRAM

## 2025-01-21 PROCEDURE — 120N000002 HC R&B MED SURG/OB UMMC

## 2025-01-21 PROCEDURE — 250N000011 HC RX IP 250 OP 636: Performed by: NURSE PRACTITIONER

## 2025-01-21 PROCEDURE — 36415 COLL VENOUS BLD VENIPUNCTURE: CPT

## 2025-01-21 RX ORDER — AMLODIPINE BESYLATE 2.5 MG/1
2.5 TABLET ORAL DAILY
Status: DISCONTINUED | OUTPATIENT
Start: 2025-01-21 | End: 2025-01-23 | Stop reason: HOSPADM

## 2025-01-21 RX ORDER — FERROUS SULFATE 325(65) MG
325 TABLET ORAL DAILY
Status: DISCONTINUED | OUTPATIENT
Start: 2025-01-21 | End: 2025-01-23 | Stop reason: HOSPADM

## 2025-01-21 RX ORDER — MEROPENEM 1 G/1
1 INJECTION, POWDER, FOR SOLUTION INTRAVENOUS EVERY 12 HOURS
Status: DISCONTINUED | OUTPATIENT
Start: 2025-01-21 | End: 2025-01-22

## 2025-01-21 RX ORDER — DIPHENHYDRAMINE HCL 25 MG
25 CAPSULE ORAL 3 TIMES DAILY PRN
Status: DISCONTINUED | OUTPATIENT
Start: 2025-01-21 | End: 2025-01-23 | Stop reason: HOSPADM

## 2025-01-21 RX ORDER — OMEGA-3-ACID ETHYL ESTERS 1 G/1
2 CAPSULE, LIQUID FILLED ORAL 2 TIMES DAILY
Status: DISCONTINUED | OUTPATIENT
Start: 2025-01-21 | End: 2025-01-23 | Stop reason: HOSPADM

## 2025-01-21 RX ADMIN — CALCITRIOL CAPSULES 0.25 MCG 0.25 MCG: 0.25 CAPSULE ORAL at 07:48

## 2025-01-21 RX ADMIN — SIROLIMUS 1 MG: 1 TABLET ORAL at 07:47

## 2025-01-21 RX ADMIN — FERROUS SULFATE TAB 325 MG (65 MG ELEMENTAL FE) 325 MG: 325 (65 FE) TAB at 11:46

## 2025-01-21 RX ADMIN — PREDNISONE 10 MG: 5 TABLET ORAL at 07:48

## 2025-01-21 RX ADMIN — EZETIMIBE 10 MG: 10 TABLET ORAL at 07:48

## 2025-01-21 RX ADMIN — APIXABAN 2.5 MG: 2.5 TABLET, FILM COATED ORAL at 20:33

## 2025-01-21 RX ADMIN — URSODIOL 250 MG: 250 TABLET, FILM COATED ORAL at 07:48

## 2025-01-21 RX ADMIN — OMEGA-3-ACID ETHYL ESTERS 2 G: 1 CAPSULE, LIQUID FILLED ORAL at 07:48

## 2025-01-21 RX ADMIN — OMEGA-3-ACID ETHYL ESTERS 1 G: 1 CAPSULE, LIQUID FILLED ORAL at 22:31

## 2025-01-21 RX ADMIN — AMLODIPINE BESYLATE 2.5 MG: 2.5 TABLET ORAL at 11:46

## 2025-01-21 RX ADMIN — MEROPENEM 1 G: 1 INJECTION, POWDER, FOR SOLUTION INTRAVENOUS at 22:31

## 2025-01-21 RX ADMIN — APIXABAN 2.5 MG: 2.5 TABLET, FILM COATED ORAL at 07:48

## 2025-01-21 RX ADMIN — Medication 1 CAPSULE: at 20:33

## 2025-01-21 RX ADMIN — GABAPENTIN 300 MG: 250 SOLUTION ORAL at 22:31

## 2025-01-21 RX ADMIN — Medication 1 EACH: at 07:47

## 2025-01-21 RX ADMIN — URSODIOL 250 MG: 250 TABLET, FILM COATED ORAL at 20:33

## 2025-01-21 RX ADMIN — LEVOTHYROXINE SODIUM 175 MCG: 0.17 TABLET ORAL at 07:48

## 2025-01-21 RX ADMIN — METOPROLOL SUCCINATE 25 MG: 25 TABLET, EXTENDED RELEASE ORAL at 07:48

## 2025-01-21 RX ADMIN — Medication 1 CAPSULE: at 07:48

## 2025-01-21 ASSESSMENT — ACTIVITIES OF DAILY LIVING (ADL)
ADLS_ACUITY_SCORE: 60
ADLS_ACUITY_SCORE: 59
ADLS_ACUITY_SCORE: 60
ADLS_ACUITY_SCORE: 59
ADLS_ACUITY_SCORE: 59
ADLS_ACUITY_SCORE: 60
ADLS_ACUITY_SCORE: 61
ADLS_ACUITY_SCORE: 60
ADLS_ACUITY_SCORE: 59
ADLS_ACUITY_SCORE: 59
ADLS_ACUITY_SCORE: 61
ADLS_ACUITY_SCORE: 60
ADLS_ACUITY_SCORE: 61
ADLS_ACUITY_SCORE: 59
ADLS_ACUITY_SCORE: 59
ADLS_ACUITY_SCORE: 60
ADLS_ACUITY_SCORE: 61
ADLS_ACUITY_SCORE: 59
DEPENDENT_IADLS:: COOKING;MEAL PREPARATION
DEPENDENT_IADLS:: COOKING;MEAL PREPARATION
ADLS_ACUITY_SCORE: 61

## 2025-01-21 NOTE — PROGRESS NOTES
NEURO: WDL, A&O x4  RESPIRATORY: WDL, NC 1LPM.  CARDIAC: WDL  SKIN: WDL  GI/: WDL X; Voiding ability: Using Purewick.   PAIN/NAUSEA: No complaints of nausea or pain.   INCISION/DRAINS: Purewick  IV ACCESS: R PIV  DIET: Regular Diet  ACTIVITY: SBA  LAB: Needing Stool Sample   PLAN: Continue plan of care, update team with any changes.      Problem: Adult Inpatient Plan of Care  Goal: Optimal Comfort and Wellbeing  Outcome: Progressing     Problem: Adult Inpatient Plan of Care  Goal: Absence of Hospital-Acquired Illness or Injury  Intervention: Prevent Infection  Recent Flowsheet Documentation  Taken 1/21/2025 0200 by Mary Araujo, RN  Infection Prevention:   single patient room provided   rest/sleep promoted   hand hygiene promoted   cohorting utilized   Stop omega 3   And tumeric 1 week prior   Send me copy of preop testing

## 2025-01-21 NOTE — PROGRESS NOTES
Advised patient on importance of wearing O2 monitoring and Nasal Cannula multiple times. Patient continuing to refuse wearing either. Stating it is not important to her and she does not want it on.

## 2025-01-21 NOTE — CONSULTS
Care Management Initial Consult    General Information  Assessment completed with: Luz Holder  Type of CM/SW Visit: Initial Assessment    Primary Care Provider verified and updated as needed: Yes   Readmission within the last 30 days: previous discharge plan unsuccessful   Return Category: Exacerbation of disease  Reason for Consult: discharge planning  Advance Care Planning: Advance Care Planning Reviewed: no concerns identified          Communication Assessment  Patient's communication style: spoken language (English or Bilingual)    Hearing Difficulty or Deaf: yes   Wear Glasses or Blind: yes    Cognitive  Cognitive/Neuro/Behavioral: WDL                      Living Environment:   People in home: alone     Current living Arrangements: independent living facility      Able to return to prior arrangements: yes     Family/Social Support:  Care provided by: self  Provides care for: no one  Marital Status:   Support system: Children          Description of Support System: Supportive, Involved    Support Assessment: Adequate family and caregiver support, Adequate social supports    Current Resources:   Patient receiving home care services: Yes  Skilled Home Care Services: Physical Therapy, Occupational Therapy     Community Resources: None  Equipment currently used at home: cane, straight, walker, rolling, grab bar, toilet, grab bar, tub/shower, shower chair  Supplies currently used at home: None    Employment/Financial:  Employment Status: retired        Financial Concerns: none     Does the patient's insurance plan have a 3 day qualifying hospital stay waiver?  No    Lifestyle & Psychosocial Needs:  Social Drivers of Health     Food Insecurity: Low Risk  (1/7/2025)    Food Insecurity     Within the past 12 months, did you worry that your food would run out before you got money to buy more?: No     Within the past 12 months, did the food you bought just not last and you didn t have money to get more?: No    Depression: Not at risk (1/2/2025)    PHQ-2     PHQ-2 Score: 2   Housing Stability: Low Risk  (1/7/2025)    Housing Stability     Do you have housing? : Yes     Are you worried about losing your housing?: No   Recent Concern: Housing Stability - High Risk (10/12/2024)    Housing Stability     Do you have housing? : No     Are you worried about losing your housing?: No   Tobacco Use: Medium Risk (1/2/2025)    Patient History     Smoking Tobacco Use: Former     Smokeless Tobacco Use: Never     Passive Exposure: Not on file   Financial Resource Strain: Low Risk  (1/7/2025)    Financial Resource Strain     Within the past 12 months, have you or your family members you live with been unable to get utilities (heat, electricity) when it was really needed?: No   Alcohol Use: Not At Risk (11/9/2023)    Received from [facility]    AUDIT-C     Frequency of Alcohol Consumption: Never     Average Number of Drinks: Patient does not drink     Frequency of Binge Drinking: Never   Transportation Needs: Low Risk  (1/7/2025)    Transportation Needs     Within the past 12 months, has lack of transportation kept you from medical appointments, getting your medicines, non-medical meetings or appointments, work, or from getting things that you need?: No   Physical Activity: Insufficiently Active (11/9/2023)    Received from [facility]    Exercise Vital Sign     Days of Exercise per Week: 5 days     Minutes of Exercise per Session: 10 min   Interpersonal Safety: Low Risk  (1/7/2025)    Interpersonal Safety     Do you feel physically and emotionally safe where you currently live?: Yes     Within the past 12 months, have you been hit, slapped, kicked or otherwise physically hurt by someone?: No     Within the past 12 months, have you been humiliated or emotionally abused in other ways by your partner or ex-partner?: No   Stress: No Stress Concern Present (11/9/2023)    Received from [facility]    [employer]  - Occupational Stress Questionnaire     Feeling of Stress : Only a little   Social Connections: Feeling Socially Integrated (12/20/2023)    Received from [facility]    OASIS : Social Isolation     Frequency of experiencing loneliness or isolation: Never   Health Literacy: Not on file     Functional Status:  Prior to admission patient needed assistance:   Dependent ADLs:: Ambulation-cane  Dependent IADLs:: Cooking, Meal Preparation    Mental Health Status:  Mental Health Status: No Current Concerns       Chemical Dependency Status:  Chemical Dependency Status: No Current Concerns           Values/Beliefs:  Spiritual, Cultural Beliefs, Judaism Practices, Values that affect care: no             Discussed  Partnership in Safe Discharge Planning  document with patient/family: No    Additional Information:  SW received consult for elevated risk score. SW met with pt at bedside and introduced self, role, and explained purpose of assessment. Pt reported she lives in [facility] and is independent with I/ADLs. Pt shared her grand daughter helps with cleaning and she has son and daughter who assist once a week with any needs she has. SW discussed PT and OT recommendation of home care and pt reported she was suppose to start PT and OT with [facility] today. Pt shared she would like to get PT and OT from them and SW inform pt she will contact them to arrange that. Pt reported she has transportation at discharge and doesn't have any other SW needs.   Next Steps:    -Send resumption of care order to [facility].     HUMA Stephen, SHAHANA  OBS/ED   Phone: [phone]  Fax: [phone]  Vocera: 1A Obs NADEEM

## 2025-01-21 NOTE — PROGRESS NOTES
"[facility]    Medicine Progress Note - Hospitalist Service    Date of Admission:  1/19/2025    Assessment & Plan   Luz Thompson is a 74 year old female admitted on 1/19/2025.  Medical history notable for primary biliary cirrhosis s/p liver transplant (2002) on chronic immunosuppression, atrial fibrillation, history of CVA, CKD stage III, diabetes mellitus type 2, hypothyroidism, hypertension, PAD.  Presented to emergency department for shortness of breath, hypoxia, fever.     Updates Today:  - follow updated ID recs   - x-ray left hip without acute osseous abnormality   - left hip pain improved this AM; she wonders if she \"over did it\" while ambulating with her cane while at home  - blood cultures obtained 1/19 growing GPCs in 1 of 2 bottles. Second set obtained overnight. Will continue to hold off on antibiotics at this time given no fever, no hypotension, no hypoxia, no tachycardia, and per ID rec   - notes a drop in O2 saturation last evening at rest; asymptomatic during the event. Remained on 1-2 L NC overnight but weaned back to RA this AM   - experiencing frequency and dysuria this AM, she wonders if she has a UTI; will obtain UA   - resume PTA amlodipine and ferrous sulfate      Hypoxia, Shortness of breath  Fever  Multiple recent hospitalizations   Hx of coccidioidomycosis (2017)  Previous history of fungal infections while on immunosuppression. Recently hospitalized 12/10 to 12/15 for sepsis due to UTI, suspected pyelonephritis.  Subsequently was hospitalized between 1/3-1/5, and 1/6 to 1/14 for, URI, aspiration pneumonia vs HAP.  Patient returned to emergency department on 1/19 for concern of shortness of breath, hypoxia, fever.  Tmax at home 101.8.  EMS found her to be hypotensive 80/55.  No clear source of infection.  Reports off/on fever since recent discharge on 1/14.  In emergency department ,WBC 10.2, Covid/flu negative,  No acute findings on chest " "x-ray.  Blood cultures and UA ordered.  Current temperature 98.2 and hemodynamically stable.  When assessed in the emergency department, able to maintain saturations in 90s on room air. UA with light yellow urine, trace leukocyte, negative nitrite. Respiratory panel negative (COVID, flu A and B, RSV). O2 saturation remained ~ 93% on RA while working with PT. Urine culture <10,000 CFU/mL Mixture of Urogenital Louann.   - follow blood cultures 1/19: gram positive cocci in 1 of 2 bottles (? Skin louann/contaminant vs true infection)  - follow blood cultures 1/20: in process   - transplant infectious disease consulted, appreciate assistance    - monitor off antimicrobials   - family bring in home thermometer to endure reading accuracy     - additional labs: BD glucan, AFB blood culture, coccidioides antigen, CrAg, CMV PCR, EBV PCR, enteric panel, HIV, LDH, parvo Ab and PCR, QuantGold, toxoplasma serologies, treponema Ab     Left hip pain - improving  Patient reports left hip pain over the past week.  Does not radiate towards groin.  Worse with ambulation.  Tender to palpation over left trochanter.  Reports history of OA in other joints. Wonders if she \"over did it\" while ambulating with her cane while at home. XR of left hip obtained and without acute osseous abnormality; no substantial joint space loss; acetabular osteophytes suggesting degenerative process.   - multimodal pain control   - encourage mobility as tolerated     Urinary frequency and dysuria  C/f UTI  Patient notes urinary frequency and dysuria that started overnight, wonders if she has a UTI.   - obtain UA     Concern for possible deconditioning  Possible difficulty performing ADLs  Patient lives at independent living.  Patient and mother concerned with patient performing ADLs with current hip pain and shortness of breath.   - PT/OT consulted, appreciate assistance   - home with home PT and OT; home with assist   - social work consulted, appreciate " assistance     Hx of primary biliary cirrhosis s/p liver transplant (2002)  Had liver transplant in 2002, unrelated living donor.  Last seen by Dr. Gentry Ramirez 10/2024.   - continue PTA prednisone, sirolimus, ursodiol  - follow up with transplant GI as previously scheduled     DERREK on CKD stage 3b - improving  Has yet to establish with nephrologist in [place] since moving from [place].  Reports has an appointment in April to establish care.. Baseline creatinine appears to be approximately 1.4.  Upon arrival creatinine 1.80.  Suspected to be secondary to volume depletion.  BMP on 1/20 showing improved  creatinine down to 1.61.   - IVF  - continue to monitor     HTN  Hx of paroxysmal atrial fibrillation  HLD  - resume PTA amlodipine   - continue PTA metoprolol with hold parameters   - continue PTA apixaban  - continue PTA Ezetimibe     DM type 2  Diabetic peripheral neuropathy  Last A1c 6.9 on 12/10/2024.  PTA linagliptin and gabapentin for neuropathy.  - hold PTA linagliptin  - continue PTA gabapentin  - POCT glucose monitoring  - sliding scale insulin   - hypoglycemia protocol      Hypothyroidism  Last TSH 3.31 on 11/14/2024.  - continue PTA synthroid           Diet: Combination Diet Regular Diet Adult  Snacks/Supplements Adult: Ensure Enlive; With Meals    DVT Prophylaxis: DOAC  Ron Catheter: Not present  Lines: None     Cardiac Monitoring: None  Code Status: Full Code      Clinically Significant Risk Factors          # Hyperchloremia: Highest Cl = 110 mmol/L in last 2 days, will monitor as appropriate      # Hypocalcemia: Lowest Ca = 8.6 mg/dL in last 2 days, will monitor and replace as appropriate     # Hypoalbuminemia: Lowest albumin = 3.4 g/dL at 1/19/2025  6:08 PM, will monitor as appropriate     # Hypertension: Noted on problem list           # DMII: A1C = 6.9 % (Ref range: <5.7 %) within past 6 months, PRESENT ON ADMISSION  # Overweight: Estimated body mass index is 29.32 kg/m  as calculated from the  "following:    Height as of this encounter: 1.715 m (5' 7.5\").    Weight as of this encounter: 86.2 kg (190 lb)., PRESENT ON ADMISSION            Social Drivers of Health    Housing Stability: Low Risk  (1/7/2025)    Housing Stability     Do you have housing? : Yes     Are you worried about losing your housing?: No   Recent Concern: Housing Stability - High Risk (10/12/2024)    Housing Stability     Do you have housing? : No     Are you worried about losing your housing?: No   Tobacco Use: Medium Risk (1/2/2025)    Patient History     Smoking Tobacco Use: Former     Smokeless Tobacco Use: Never   Physical Activity: Insufficiently Active (11/9/2023)    Received from [employer]    Exercise Vital Sign     Days of Exercise per Week: 5 days     Minutes of Exercise per Session: 10 min          Disposition Plan     Medically Ready for Discharge: Anticipated in 2-4 Days           The patient's care was discussed with the Attending Physician, Dr. Woodard and Patient.    Katherine Luna NP  Hospitalist Service  [facility]  Securely message with [employer] (more info)  Text page via [facility] Paging/Directory   ______________________________________________________________________    Interval History   Virginia was seen sitting up in bed eating breakfast. Says that last night she was on her phone when her O2 saturation dropped into mid-80s; was asymptomatic. Put back on 1-2L NC with improvement in saturation. On RA this AM. Left hip pain improved, she wonders if she \"over did it\" while ambulating with her cane while at home. Patient notes urinary frequency and dysuria that started overnight, wonders if she has a UTI. Discuss plan for continuing to work with ID team and trying to bring in her home thermometer. All questions and concerns addressed at this time.     Physical Exam   Vital Signs: Temp: 98.4  F (36.9  C) Temp src: Oral BP: (!) 144/59 Pulse: 66   Resp: 16 SpO2: 97 % O2 " Device: Nasal cannula Oxygen Delivery: 1 LPM  Weight: 190 lbs 0 oz    GENERAL: Alert and awake. Answering questions appropriately. Oriented x 3. NAD. Pleasant and conversational   HEENT: Anicteric sclera. EOMI. Mucous membranes moist   CARDIOVASCULAR: RRR. S1, S2. No murmurs, rubs, or gallops.   RESPIRATORY: Effort normal on RA. Clear to auscultation bilaterally, no rales, rhonchi or wheezes  GI: Abdomen soft, non-tender abdomen without rebound or guarding, normoactive bowel sounds present  MUSCULOSKELETAL: Moves all extremities.   EXTREMITIES: No peripheral edema. No calf asymmetry, erythema, or tenderness.   NEUROLOGICAL: No focal deficits. Moving all extremities symmetrically.   SKIN: Intact. Warm and dry. No jaundice. No rashes on exposed skin   PSYCH: Affect appropriate     Medical Decision Making       50 MINUTES SPENT BY ME on the date of service doing chart review, history, exam, documentation & further activities per the note.      Data   Imaging results reviewed over the past 24 hrs:   Recent Results (from the past 24 hours)   XR Pelvis w Hip Left 1 View    Narrative    2 views left hip radiographs 1/20/2025 3:36 PM    History: Left hip pain    Comparison: CT pelvis 12/10/2024    Findings:    AP view of pelvis,  frog leg lateral  views of the left hip were  obtained.     No acute osseous abnormality.      Osteophytic spurring in the superior lateral edge of bilateral  acetabulum. No substantial joint space loss in the bilateral hips.     Others:    Enthesopathic changes of pelvis and trochanters. Retained surgical  clip in the central pelvis. Degenerative changes of the visualized  lumbar spine and SI joints. Small focus of calcification along the  right lower quadrant also seen on prior CT within the abdominal wall.  Possible injection granulomas.    Pelvic phlebolith.       Impression    Impression:  1. No acute osseous abnormality.  2. No substantial joint space loss in the bilateral hips.  Acetabular  osteophytes suggesting degenerative process.    I have personally reviewed the examination and initial interpretation  and I agree with the findings.    JUTTA ELLERMANN, MD         SYSTEM ID:  [id number]     Recent Labs   Lab 01/21/25  0639 01/20/25  2247 01/20/25  1800 01/20/25  0930 01/20/25  0614 01/19/25  2304 01/19/25  1808   WBC 6.7  --   --   --  7.0  --  10.2   HGB 8.3*  --   --   --  8.7*  --  10.3*   MCV 91  --   --   --  91  --  88     --   --   --  361  --  397     --   --   --  141  --  139   POTASSIUM 4.0  --   --   --  4.2  --  3.9   CHLORIDE 110*  --   --   --  108*  --  102   CO2 21*  --   --   --  22  --  24   BUN 37.6*  --   --   --  35.0*  --  33.7*   CR 1.56*  --   --   --  1.61*  --  1.80*   ANIONGAP 11  --   --   --  11  --  13   KEILY 9.0  --   --   --  8.6*  --  8.9   * 201* 149*   < > 128*   < > 114*   ALBUMIN  --   --   --   --   --   --  3.4*   PROTTOTAL  --   --   --   --   --   --  6.4   BILITOTAL  --   --   --   --   --   --  0.4   ALKPHOS  --   --   --   --   --   --  113   ALT  --   --   --   --   --   --  29   AST  --   --   --   --   --   --  32    < > = values in this interval not displayed.

## 2025-01-21 NOTE — TELEPHONE ENCOUNTER
Patient is currently inpatient, writer informed Virginia that she will contacted again upon her discharge, patient verbalized understanding.    Taniya Cavanaugh RN  [facility]

## 2025-01-21 NOTE — PROGRESS NOTES
SOLID ORGAN TRANSPLANT INFECTIOUS DISEASES PROGRESS NOTE     Patient:  Luz Thompson   YOB: 1950  Date of Visit:  01/21/2025  Date of Admission: 1/19/2025          Assessment and Recommendations:   Recommendations:  Continue to monitor off antimicrobials  Please obtain CT chest, ideally with contrast if able  Would make sure patient is up-to-date on all age-appropriate cancer screening  Will arrange for ID clinic follow-up in the near future  Pending evaluation  Parvovirus, toxoplasmosis serologies, Fungitell, EBV PCR, CMV PCR Coccidioides antigen, QuantiFERON gold, AFB blood culture    Transplant ID will continue to follow with you.     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     #Fever of unknown origin  Reports baseline temperature is 97.6, temperatures have been in the 99's during previous hospitalization and at home with Tmax 101.8 on home thermometer. Recent admissions with UTI (12/10-12/15), fever (1/3-1/5) and hospital acquired pneumonia (1/6-1/14). On review of previous admission fever curves temperatures . No change in fever curve during 7 day course of meropenem or previous UTI treatment. This, along with the presence of fevers x2 months makes a typical bacterial process unlikely. UA is not c/w infection and has no urinary symptoms.  Patient with history of prior Coccidioides infection in 2017. Previously on voriconazole for prophylaxis, stopped in June 2024.  Prior CT imaging with residual nodular disease, but none recently. Testing for Coccidioides antigen.  "Would additionally recommend obtaining CT scan to assess for stability of lung lesions.   Testing pending for atypical bacterial infections including NTM's and TB, viral infections, and other fungal infections although all seemingly less likely  Unclear if infectious source will be identified, would consider evaluation into occult malignant sources of fever as well as rheumatologic sources  We will arrange for infectious disease follow-up in the outpatient setting for continued evaluation    # Staphylococcus epidermitis isolated in blood culture 1/4 on 1/19/2025  Isolated on admission blood cultures. Patient not febrile at the time. Only 1 of 4 bottles were positive. Repeat cultures were collected on 1/20/2025 without antibiotics being given. That set is currently no growth to date. Suspect likely a contaminant at this point and will continue to monitor off therapy.    #Hx coccidioides  Dx in winter [id number], did not tolerate fluconazole due to severe rash. Treated with voriconazole, continued until her return to [place]. CXR with stable calcifications. Recent fungal antibody panel negative though unclear how reliable this is. See above for additional details    #Hx EBV viremia s/p rituxan 2016  Not checked for several years, no lymphadenopathy on recent abdomen/pelvis CT    #Numerous reported antimicrobial allergies  Per allergy note 8/2/2019 \"Prick and intradermal and patch testing to fluconazole, doxycycline, azithromycin, penicillins, and cephalosporins with immediate and delayed readings being negative. \" States she will never take fluconazole again. Would be candidate for oral provocation testing in future with pcn.     Previous ID Issues:  - Recurrent UTIs, several c/b bacteremia. 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 Has fosfomycin prn at onset of sx  - Coccidioides infection (dx 2016)  - EBV viremia - moderate, treated with Rituxan (8/31/16)  - Left otitis, followed with ENT due to hx perforation  - Hx " angular chelitis and thrush    Other ID issues:  - QTc interval:  430msec on 1/6/25  - Bacterial prophylaxis:  none  - Pneumocystis prophylaxis:  none  - Viral serostatus: EBV+, CMV-  - Viral prophylaxis:  none  - Fungal prophylaxis:  none, previously voriconazole  - Immunosuppression: prednisone 10mg daily, serolimus  - Isolation status: Droplet (pending parvo)    Madhu Reece MD  Infectious Disease Fellow  Most easily reached on [employer] pager [phone] if urgent         History of Present Illness:   Transplants:  5/22/2002 (Liver), Postoperative day:  8280     Afebrile in last 24 hours, with no documented fever this hospitalization  Overall feeling the same, states she felt warm this a.m. with her temperature of 99.3, but did not experience chills  Additional history regarding patient's previous Coccidioides, diagnosed with pulmonary Coccidioides in 2017.  Treated with course of voriconazole and then transition to prophylactic voriconazole with anticipation to continue for lifelong.  Patient continue taking this daily until she moved to [place] in June 2024.  Additional social history: Lives in [facility].  Previously lived in [place].  Does not have any pets.  Does have potential exposure to raw milk, although unclear per the patient.  Some form exposure when visiting her son, farm with chickens, cows, pigs.  Remote history of significant [place] travel with [place] trips across the [place].  Only international travel to [place] around [facility].  No recent camping, gardening, landscaping, or yard work         Current Medications:     Current Facility-Administered Medications   Medication Dose Route Frequency Provider Last Rate Last Admin    amLODIPine (NORVASC) tablet 2.5 mg  2.5 mg Oral Daily Katherine Luna NP   2.5 mg at 01/21/25 1146    apixaban ANTICOAGULANT (ELIQUIS) tablet 2.5 mg  2.5 mg Oral BID Kane Meyer PA-C   2.5 mg at  01/21/25 0748    calcitRIOL (ROCALTROL) capsule 0.25 mcg  0.25 mcg Oral Daily Kaen Meyer PA-C   0.25 mcg at 01/21/25 0748    ezetimibe (ZETIA) tablet 10 mg  10 mg Oral Daily Kane Meyer PA-C   10 mg at 01/21/25 0748    ferrous sulfate (FEROSUL) tablet 325 mg  325 mg Oral Daily Katherine Luna NP   325 mg at 01/21/25 1146    gabapentin (NEURONTIN) solution 300 mg  300 mg Oral At Bedtime Kane Meyer PA-C   300 mg at 01/20/25 2245    insulin aspart (NovoLOG) injection (RAPID ACTING)  1-7 Units Subcutaneous TID AC Kane Meyer PA-C        insulin aspart (NovoLOG) injection (RAPID ACTING)  1-5 Units Subcutaneous At Bedtime Kane Meyer PA-C        levothyroxine (SYNTHROID/LEVOTHROID) tablet 175 mcg  175 mcg Oral QAM AC Kane Meyer PA-C   175 mcg at 01/21/25 0748    metoprolol succinate ER (TOPROL XL) 24 hr tablet 25 mg  25 mg Oral Daily Kane Meyer PA-C   25 mg at 01/21/25 0748    multivitamin  with lutein (OCUVITE WITH LUTEIN) per capsule 1 capsule  1 capsule Oral BID Kane Meyer PA-C   1 capsule at 01/21/25 0748    Nutrisource Fiber PO packet 1 each  1 packet Oral Daily Kane Meyer PA-C   1 each at 01/21/25 0747    omega-3 acid ethyl esters (LOVAZA) capsule 2 g  2 g Oral Daily Melody Betancourt APRN CNP   2 g at 01/21/25 0748    predniSONE (DELTASONE) tablet 10 mg  10 mg Oral Daily Kane Meyer PA-C   10 mg at 01/21/25 0748    sirolimus (GENERIC EQUIVALENT) tablet 1 mg  1 mg Oral Daily Kane Meyer PA-C   1 mg at 01/21/25 0747    sodium chloride (PF) 0.9% PF flush 3 mL  3 mL Intracatheter Q8H Kane Meyer PA-C   3 mL at 01/21/25 0749    ursodiol (ACTIGALL) tablet 250 mg  250 mg Oral BID Kane Meyer PA-C   250 mg at 01/21/25 0748            Allergies:     Allergies   Allergen Reactions    Fluconazole Hives and Itching     Full body hives    [See intradermal skin testing results from 8/2/2019]    Mycophenolate Diarrhea and Nausea and Vomiting      Patient stated it was chronic and lasted months      Penicillins Anaphylaxis, Hives, Itching and Rash     [See intradermal skin testing results from 8/2/2019]      Simvastatin Muscle Pain (Myalgia)     severe  Other reaction(s): Myalgia caused by statin    Methotrexate Other (See Comments)     Other reaction(s): Sore  Sores in mouth, esophagus, and stomach.       Morphine And Codeine Itching and Other (See Comments)     Psych disturbance  Other reaction(s): Confusion, Mood alteration    Quinolones Anxiety, Dizziness, Headache, Other (See Comments), Palpitations and Unknown     Other reaction(s): Hyperactive behavior, Lightheadedness, Mood alteration    Dizzy, light headed    Dizziness, shaky, and jumpy    Benadryl [Diphenhydramine Hcl]      Insomnia     Capsules, Empty Gelatin [Gelatin]     Lansoprazole Diarrhea    Azithromycin Itching     [See intradermal skin testing results from 8/2/2019]    Bactrim [Sulfamethoxazole-Trimethoprim] Other (See Comments)     Numb mouth, tingling lips (treated with anti-histamines)    Cephalosporins Itching     [See intradermal skin testing results from 8/2/2019]    Ciprofloxacin Hcl Other (See Comments) and Dizziness     Insomnia, mood lability, Irregular heart beat         Doxycycline Itching and Unknown     [See intradermal skin testing results from 8/2/2019]    Lisinopril Cough    Omeprazole Itching    Tolectin [Nsaids] Rash    Tolmetin Rash and Itching    Tramadol Rash, Hives and Itching            Physical Exam:   Vitals were reviewed  Temp:  [97.8  F (36.6  C)-99.4  F (37.4  C)] 97.8  F (36.6  C)  Pulse:  [65-85] 80  Resp:  [16-18] 18  BP: (143-171)/(59-92) 171/92  SpO2:  [94 %-99 %] 96 %    Vitals:    01/19/25 1704   Weight: 86.2 kg (190 lb)     GENERAL:  Well-developed, well-nourished, sitting in bed in no acute distress. Very pleasant  ENT:  Head is normocephalic, atraumatic. Oropharynx is moist without exudates or ulcers.  EYES:  Eyes have anicteric sclerae.   NECK:   Supple.  LUNGS:  Unlabored breathing. Clear to auscultation.  CARDIOVASCULAR:  Regular rate and rhythm with no murmurs, rubs, or gallops.  ABDOMEN:  Non-distended, soft, nontender.  EXT: Extremities warm and well perfused. No edema.  SKIN:  No acute rashes.   NEUROLOGIC:  Awake, alert, interactive.         Laboratory Data:     Microbiology:  Culture   Date Value Ref Range Status   01/20/2025 No growth after 12 hours  Preliminary   01/20/2025 No growth after 12 hours  Preliminary   01/20/2025 No growth after 12 hours  Preliminary   01/19/2025 <10,000 CFU/mL Mixture of Urogenital Louann  Final   01/19/2025 No growth after 1 day  Preliminary   01/19/2025 Positive on the 1st day of incubation (A)  Preliminary   01/19/2025 Staphylococcus epidermidis (AA)  Preliminary     Comment:     1 of 2 bottles  The recovery of this organism from a single blood culture bottle most likely represents contamination. If susceptibility testing is needed, please refer to the antibiogram or contact [facility]DL. If contamination is suspected, it is important to repeat two sets of blood cultures from two different sites.   01/10/2025 <10,000 CFU/mL Urogenital louann  Final   01/07/2025 No Growth  Final   01/06/2025 No Growth  Final   01/03/2025 No Growth  Final   01/03/2025 No Growth  Final   12/10/2024 No Growth  Final   12/10/2024 No Growth  Final   12/10/2024 >100,000 CFU/mL Klebsiella pneumoniae (A)  Final   11/06/2024 No Growth  Final   10/11/2024 No Growth  Final   10/11/2024 50,000-100,000 CFU/mL Mixture of Urogenital Louann  Final   09/30/2024 No Growth  Final   09/22/2024 >100,000 CFU/mL Klebsiella oxytoca ESBL (A)  Final   09/22/2024 Positive on the 1st day of incubation (A)  Final   09/22/2024 Klebsiella oxytoca ESBL (AA)  Final     Comment:     2 of 2 bottles   09/22/2024 Pseudomonas aeruginosa (AA)  Final     Comment:     1 of 2 bottles   08/20/2024 >100,000 CFU/mL Klebsiella oxytoca ESBL (A)  Final     Culture Micro   Date Value Ref Range  Status   08/30/2019 No growth  Final   08/29/2019 No growth  Final   08/28/2019 No growth  Final   08/28/2019 No growth  Final   08/27/2019 10,000 to 50,000 colonies/mL  Escherichia coli   (A)  Final   08/27/2019 (A)  Final    Cultured on the 1st day of incubation:  Escherichia coli  Susceptibility testing done on previous specimen     08/27/2019   Final    Critical Value/Significant Value, preliminary result only, called to and read back by   Maria Teresa Martines RN 08/27/2019 @1425 dk/hospitals     08/26/2019 (A)  Final    Cultured on the 1st day of incubation:  Escherichia coli     08/26/2019   Final    Critical Value/Significant Value, preliminary result only, called to and read back by  Maria Teresa Martines RN [facility] at 1229 8.27.19.     08/26/2019   Final    (Note)  POSITIVE for E.COLI by Verigene multiplex nucleic acid test. Final  identification and antimicrobial susceptibility testing will be  verified by standard methods. Verigene test will not distinguish  E.coli from Shigella species including S.dysenteriae, S.flexneri,  S.boydii, and S.sonnei. Specimens containing Shigella species or  E.coli will be reported as Positive for E.coli.    Specimen tested with Verigene multiplex, gram-negative blood culture  nucleic acid test for the following targets: Acinetobacter sp.,  Citrobacter sp., Enterobacter sp., Proteus sp., E. coli, K.  pneumoniae/oxytoca, P. aeruginosa, and the following resistance  markers: CTXM, KPC, NDM, VIM, IMP and OXA.    Critical Value/Significant Value called to and read back by  Flower Lujan Rn @ 1449 8.27.19        09/27/2018 Moderate growth  Staphylococcus aureus   (A)  Final   07/30/2018   Final    50,000 to 100,000 colonies/mL  mixed urogenital louann  Susceptibility testing not routinely done     07/18/2016 (A)  Final    50,000 to 100,000 colonies/mL Klebsiella pneumoniae   05/21/2016 No growth  Final   05/21/2016 No growth  Final   05/16/2016 Canceled, Test credited Duplicate request  Final   05/16/2016  Canceled, Test credited Duplicate request  Final   05/16/2016 No growth  Final   05/16/2016 No growth  Final   05/13/2016 No growth after 29 days  Final   05/13/2016   Final    Canceled, Test credited Quantity not sufficient Notification of test cancellation   was given to MATTHEW FROM [facility], HE WILL REORDER AND RECOLLECT     05/13/2016 No growth  Final   05/13/2016 No growth  Final   05/13/2016   Final    10,000 to 50,000 colonies/mL mixed urogenital louann  Susceptibility testing not routinely done     05/13/2016 No growth  Final   09/25/2014 (A)  Final    Normal louann  Moderate growth Haemophilus influenzae Beta lactamase negative     09/03/2014 (A)  Final    Normal louann  Heavy growth Haemophilus influenzae Beta lactamase negative  beta lactamase negative isolates are susceptible to ampicillin,   amoxacillin/clavulanic, levofloxacin and ceftriaxone     08/19/2014 No growth  Final   08/19/2014 No growth  Final   07/24/2014 No growth  Final   07/24/2014 No growth  Final   07/23/2014   Final    10,000 to 50,000 colonies/mL mixed urogenital louann   07/23/2014 No growth  Final   07/23/2014 No growth  Final   04/16/2014   Final    10,000 to 50,000 colonies/mL Mixed gram positive louann  Multiple species present, probable perineal contamination.     02/19/2014 No growth  Final   02/19/2014 No growth  Final   02/18/2014 No growth  Final   02/18/2014 No growth  Final   12/30/2013 No growth  Final   11/06/2013 No growth  Final   11/06/2013 No growth  Final   09/15/2013 No growth  Final   09/15/2013 No growth  Final   09/14/2013 No growth  Final   09/14/2013   Final    Cultured on the 1st day of incubation: Escherichia coli  Critical Value, preliminary result only, called to and read back by NENO THORNE 4E ON 09/15/13 @ 0255 BY DT  Gram stain review consistent with reported results.   09/14/2013   Final    Cultured on the 1st day of incubation: Escherichia coli  Critical Value, preliminary result only, called to  and read back by NENO THORNE 4E ON 09/15/13 @ 0255 BY DT  Gram stain review consistent with reported results.  Susceptibility testing done on previous specimen   08/16/2013 No growth  Final   08/15/2013 No growth  Final   08/15/2013 No growth  Final   08/13/2013   Final    Cultured on the 1st day of incubation: Escherichia coli  Critical Value, preliminary result only, called to and read back by Kylah Alvares   RN at 0435 on 08/14/13. hd  Susceptibility testing done on previous specimen   08/13/2013   Final    Cultured on the 1st day of incubation: Escherichia coli  Critical Value, preliminary result only, called to and read back by Geetha Bryant   RN 6B 8/13/13 2355 dg  Called updated result to Flower Valencia RN @ 2000 8/14/13. NAP   09/27/2012   Final    >100,000 colonies/mL Mixed gram negative and positive louann  Multiple species present, probable perineal contamination.  Susceptibility testing not routinely done   10/27/2011 No growth  Final   10/24/2011 No growth  Final   10/11/2011   Final    <10,000 colonies/mL Gram positive cocci  <10,000 colonies/mL Strain 2 Gram positive cocci  No further identification  Susceptibility testing not routinely done   09/23/2011   Final    <10,000 colonies/mL Mixed gram negative and positive louann  Multiple species present, probable perineal contamination.   07/28/2011 50 to 100,000 colonies/mL Escherichia coli  Final   07/28/2011   Final    Canceled, Test credited  Quantity not sufficient  NOTIFIED GERMAN AT 1448,VML   07/25/2011 No growth  Final   06/24/2011 50 to 100,000 colonies/mL Group D Enterococcus  Final   06/19/2011 No growth  Final   06/19/2011 No growth  Final   11/27/2010 >100,000 colonies/mL Escherichia coli  Final   01/12/2009 Normal louann  Final   01/12/2009   Final    Light growth Candida albicans / dubliniensis For non-significant sites, Candida     Comment:      albicans is not differentiated from Sandra dubliniensis. This is the   national   standard.  No  additional fungi cultured after 4 weeks incubation   01/12/2009   Final    Culture received and in progress. Assayed at [facility]     Comment:      [place] [address]  Positive AFB results are called as soon as detected.  Final report to follow in 7 to 8 weeks.  Culture negative for acid fast bacilli   12/05/2008   Final    <10,000 colonies/mL Staphylococcus species Susceptibility testing not routinely     Comment:      done   12/05/2008 No acid fast bacilli isolated after 6 weeks  Final   12/05/2008 No growth  Final   11/13/2008 No acid fast bacilli isolated after 6 weeks  Final   11/12/2008 Moderate growth Group D Enterococcus  Final     Comment:     No VRE isolated   11/12/2008 No growth  Final   11/11/2008 <10,000 colonies/mL Mixed gram positive louann  Final     Comment:     Multiple species present, probable perineal contamination.   11/11/2008 No growth  Final   11/11/2008 No growth  Final   11/11/2008 No growth  Final   11/11/2008 No growth  Final   02/10/2008 No growth  Final   02/10/2008 No growth  Final   02/10/2008   Final    10 to 50,000 colonies/mL Mixed gram negative and positive louann     Comment:     Multiple species present, probable perineal contamination.   08/21/2007 No growth  Final   08/21/2007 No growth  Final   08/21/2007 Specimen not received  Final     Comment:     Canceled, Test credited   08/21/2007 No growth  Final   08/21/2007 Specimen not received Canceled, Test credited  Final   08/21/2007 No growth  Final   08/21/2007 No growth  Final   08/21/2007   Final    No Salmonella, Shigella, Campylobacter or E coli 0:157 isolated.   01/08/2007 No growth  Final   01/08/2007   Final    No Salmonella, Shigella, Campylobacter or E coli 0:157 isolated.   01/08/2007 No growth  Final   01/08/2007 No growth  Final   01/08/2007 No growth  Final   01/07/2007   Final    No Salmonella, Shigella, Campylobacter or E coli 0:157 isolated.   10/03/2006 >100,000 colonies/mL Escherichia coli   Final   10/05/2005 No Beta Streptococcus isolated  Final   10/05/2005   Final    <10,000 colonies/mL Mixed gram negative and positive louann     Comment:     Multiple species present, probable perineal contamination.  Susceptibility testing not routinely done   06/12/2005   Final    <10,000 colonies/mL Lactose  Susceptibility testing not routinely done   06/12/2005 No growth  Final       Inflammatory Markers    Recent Labs   Lab Test 08/26/19  2331 05/20/19  0957 07/30/18  0855 09/03/17  0912 09/02/17  0648 09/01/17  0832   SED 78* 47* 73*  --   --   --    .0* <2.9 5.2 64.0* 71.0* 51.0*       Metabolic Studies       Recent Labs   Lab Test 01/21/25  1448 01/21/25  0816 01/21/25  0639 01/20/25  2247 01/20/25  0930 01/20/25  0614 01/19/25  2304 01/19/25  1908 01/19/25  1808 01/13/25  1314 01/13/25  0705 01/06/25  2306 01/06/25  2302 12/11/24  0229 12/10/24  2148 12/10/24  1446 11/14/24  1506 08/26/19  2331 08/05/19  1552 05/14/18  1019 04/17/18  0942   NA  --   --  142  --   --  141  --   --  139  --  140   < >  --    < >  --    < > 140   < > 139   < > 138   POTASSIUM  --   --  4.0  --   --  4.2  --   --  3.9  --  4.5   < >  --    < >  --    < > 4.4   < > 2.9*   < > 3.9   CHLORIDE  --   --  110*  --   --  108*  --   --  102  --  107   < >  --    < >  --    < > 107   < > 99   < > 102   CO2  --   --  21*  --   --  22  --   --  24  --  22   < >  --    < >  --    < > 21*   < > 30   < > 28   ANIONGAP  --   --  11  --   --  11  --   --  13  --  11   < >  --    < >  --    < > 12   < > 10   < > 8   BUN  --   --  37.6*  --   --  35.0*  --   --  33.7*  --  35.8*   < >  --    < >  --    < > 42.1*   < > 36*   < > 36*   CR  --   --  1.56*  --   --  1.61*  --   --  1.80*  --  1.44*   < >  --    < >  --    < > 1.56*   < > 1.41*   < > 1.74*   GFRESTIMATED  --   --  34*  --   --  33*  --   --  29*  --  38*   < >  --    < >  --    < > 34*   < > 38*   < > 29*   * 111* 133* 201*   < > 128*   < >  --  114*   < > 97    < >  --    < >  --    < > 136*   < > 269*   < > 125*   A1C  --   --   --   --   --   --   --   --   --   --   --   --   --   --  6.9*  --   --    < >  --    < >  --    KEILY  --   --  9.0  --   --  8.6*  --   --  8.9  --  9.1   < >  --    < >  --    < > 9.6   < > 8.5   < > 9.1   PHOS  --   --   --   --   --   --   --   --   --   --   --   --   --   --   --   --  3.8  --  3.3  --   --    MAG  --   --   --   --   --   --   --   --   --   --   --   --   --   --   --   --   --   --  2.2  --  2.8*   LACT  --   --   --   --   --   --   --  1.0  --   --   --   --  1.4   < >  --    < >  --    < >  --   --   --     < > = values in this interval not displayed.       Hepatic Studies    Recent Labs   Lab Test 01/20/25  0614 01/19/25  1808 01/13/25  1410 01/06/25  2306 01/03/25  1733 12/14/24  0743   BILITOTAL  --  0.4 0.2 0.3 0.4 <0.2   ALKPHOS  --  113 103 149 108 106   ALBUMIN  --  3.4* 3.6 3.5 3.5 2.8*   AST  --  32 19  --  22 31   ALT  --  29 24  --  26 66*   *  --   --   --   --   --        Pancreatitis testing    Recent Labs   Lab Test 01/03/25  1733 12/10/24  1446 09/04/24  1008 09/18/20  0000 08/26/19  2331 05/20/19  0957 12/28/18  0000 10/16/18  0902 05/18/18  0731   LIPASE 29 33  --   --  66*  --   --   --   --    TRIG  --   --  329* 432  --  146 339* 269* 557*       Hematology Studies      Recent Labs   Lab Test 01/21/25  0639 01/20/25  0614 01/19/25  1808 01/13/25  0705 01/12/25  0705 01/11/25  0931 01/04/25  0742 01/03/25  1733 10/11/24  2212 09/30/24  1545 08/27/19  0532 08/26/19  2331 05/19/18  0629 05/18/18  0731 09/03/17  0912 09/02/17  0648 09/01/17  0832   WBC 6.7 7.0 10.2 6.6 6.1 7.0   < > 9.2   < > 8.2   < > 12.5*   < > 8.0   < > 10.2 9.4   ANEU  --   --   --   --   --   --   --  6.9  --  3.7  --  10.8*  --  5.0  --  7.4 6.5   ALYM  --   --   --   --   --   --   --  1.0  --  2.7  --  0.8  --  1.9  --  1.7 1.7   KATHY  --   --   --   --   --   --   --  1.0  --  1.0  --  0.7  --  1.0  --  0.9 1.0   AEOS   --   --   --   --   --   --   --  0.0  --  0.2  --  0.0  --  0.1  --  0.2 0.1   HGB 8.3* 8.7* 10.3* 8.9* 8.2* 9.2*   < > 10.2*   < > 11.1*   < > 10.5*   < > 11.6*   < > 10.5* 11.1*   HCT 28.1* 28.7* 32.2* 29.1* 26.4* 29.4*   < > 32.2*   < > 35.0   < > 33.0*   < > 36.0   < > 34.0* 35.0    361 397 376 361 377   < > 418   < > 388   < > 226   < > 324   < > 259 269    < > = values in this interval not displayed.       Urine Studies     Recent Labs   Lab Test 01/21/25  1422 01/19/25  2100 01/07/25  1950 01/03/25  1934 12/10/24  1739   URINEPH 5.5 6.0 5.5 5.5 5.5   NITRITE Negative Negative Negative Negative Negative   LEUKEST Large* Trace* Negative Trace* Large*   WBCU >182* 7* 2 <1 >182*     Hepatitis C Testing     Hepatitis C Antibody   Date Value Ref Range Status   05/13/2016  NR Final    Nonreactive   Assay performance characteristics have not been established for newborns,   infants, and children     03/26/2012 Negative NEG Final         Last check of C difficile  C Diff Toxin B PCR   Date Value Ref Range Status   05/17/2012   Final    Negative: Clostridium difficile target DNA sequences NOT detected, presumed   negative for Clostridium difficile toxin B or the number of bacteria present   may be below the limit of detection for the test.   FDA approved assay performed using [employer] GeneXpert real-time PCR.   A negative result does not exclude actual disease due to Clostridium difficile   and may be due to improper collection, handling and storage of the specimen or   the number of organisms in the specimen is below the detection limit of the   assay.              Imaging:   CXR 1/19/25  IMPRESSION: Stable calcifications right base. Implanted cardiac monitor. No acute infiltrate or significant change.

## 2025-01-21 NOTE — PLAN OF CARE
"Goal Outcome Evaluation: AVSS. Patient had no pain. She remains on 1L of oxygen for comfort with oxygen saturation in the 90's. No nausea and her appetite remains good. Patient had a bowel movement during shift and had good urine output with the external catheter. Blood glucose was 201 and patient refused 1 unit of insulin. Blood culture result came back positive for staffloccus  emb (see lab results). Additional blood cultures were taken at 1930. No other orders or changes.       Plan of Care Reviewed With: patient    Overall Patient Progress: improvingOverall Patient Progress: improving    Outcome Evaluation: Vitally stable, oxygen added back on for comfort at 1L.    Problem: Adult Inpatient Plan of Care  Goal: Plan of Care Review  Description: The Plan of Care Review/Shift note should be completed every shift.  The Outcome Evaluation is a brief statement about your assessment that the patient is improving, declining, or no change.  This information will be displayed automatically on your shift  note.  Outcome: Progressing  Flowsheets (Taken 1/20/2025 2152)  Outcome Evaluation: Vitally stable, oxygen added back on for comfort at 1L.  Plan of Care Reviewed With: patient  Overall Patient Progress: improving  Goal: Patient-Specific Goal (Individualized)  Description: You can add care plan individualizations to a care plan. Examples of Individualization might be:  \"Parent requests to be called daily at 9am for status\", \"I have a hard time hearing out of my right ear\", or \"Do not touch me to wake me up as it startles  me\".  Outcome: Progressing  Goal: Absence of Hospital-Acquired Illness or Injury  Outcome: Progressing  Intervention: Identify and Manage Fall Risk  Recent Flowsheet Documentation  Taken 1/20/2025 2040 by Angelika Werner RN  Safety Promotion/Fall Prevention:   supervised activity   activity supervised   clutter free environment maintained   mobility aid in reach   nonskid shoes/slippers when out of " bed  Intervention: Prevent Skin Injury  Recent Flowsheet Documentation  Taken 1/20/2025 2040 by Angelika Werner, RN  Body Position: position changed independently  Skin Protection:   adhesive use limited   protective footwear used   transparent dressing maintained  Intervention: Prevent and Manage VTE (Venous Thromboembolism) Risk  Recent Flowsheet Documentation  Taken 1/20/2025 2040 by Angelika Werner, RN  VTE Prevention/Management: SCDs off (sequential compression devices)  Intervention: Prevent Infection  Recent Flowsheet Documentation  Taken 1/20/2025 2040 by Angelika Werner RN  Infection Prevention:   environmental surveillance performed   hand hygiene promoted   personal protective equipment utilized   rest/sleep promoted   single patient room provided  Goal: Optimal Comfort and Wellbeing  Outcome: Progressing  Goal: Readiness for Transition of Care  Outcome: Progressing

## 2025-01-22 ENCOUNTER — APPOINTMENT (OUTPATIENT)
Dept: PHYSICAL THERAPY | Facility: CLINIC | Age: 75
DRG: 206 | End: 2025-01-22
Payer: MEDICARE

## 2025-01-22 ENCOUNTER — APPOINTMENT (OUTPATIENT)
Dept: CT IMAGING | Facility: CLINIC | Age: 75
DRG: 206 | End: 2025-01-22
Payer: MEDICARE

## 2025-01-22 LAB
1,3 BETA GLUCAN SER-MCNC: 37 PG/ML
ALBUMIN SERPL BCG-MCNC: 3.1 G/DL (ref 3.5–5.2)
ALBUMIN UR-MCNC: 30 MG/DL
ALP SERPL-CCNC: 103 U/L (ref 40–150)
ALT SERPL W P-5'-P-CCNC: 22 U/L (ref 0–50)
ANION GAP SERPL CALCULATED.3IONS-SCNC: 11 MMOL/L (ref 7–15)
APPEARANCE UR: ABNORMAL
AST SERPL W P-5'-P-CCNC: 18 U/L (ref 0–45)
B19V DNA SER QL NAA+PROBE: NOT DETECTED
B19V IGG SER IA-ACNC: 1.82 IV
B19V IGM SER IA-ACNC: 0.16 IV
BACTERIA BLD CULT: ABNORMAL
BACTERIA BLD CULT: ABNORMAL
BACTERIA UR CULT: NORMAL
BILIRUB DIRECT SERPL-MCNC: <0.2 MG/DL (ref 0–0.3)
BILIRUB SERPL-MCNC: 0.2 MG/DL
BILIRUB UR QL STRIP: NEGATIVE
BUN SERPL-MCNC: 33 MG/DL (ref 8–23)
CALCIUM SERPL-MCNC: 9.3 MG/DL (ref 8.8–10.4)
CHLORIDE SERPL-SCNC: 109 MMOL/L (ref 98–107)
COLOR UR AUTO: YELLOW
CREAT SERPL-MCNC: 1.4 MG/DL (ref 0.51–0.95)
EGFRCR SERPLBLD CKD-EPI 2021: 39 ML/MIN/1.73M2
ERYTHROCYTE [DISTWIDTH] IN BLOOD BY AUTOMATED COUNT: 15.5 % (ref 10–15)
GAMMA INTERFERON BACKGROUND BLD IA-ACNC: 0.16 IU/ML
GLUCOSE BLDC GLUCOMTR-MCNC: 131 MG/DL (ref 70–99)
GLUCOSE BLDC GLUCOMTR-MCNC: 157 MG/DL (ref 70–99)
GLUCOSE BLDC GLUCOMTR-MCNC: 161 MG/DL (ref 70–99)
GLUCOSE BLDC GLUCOMTR-MCNC: 177 MG/DL (ref 70–99)
GLUCOSE BLDC GLUCOMTR-MCNC: 92 MG/DL (ref 70–99)
GLUCOSE SERPL-MCNC: 116 MG/DL (ref 70–99)
GLUCOSE UR STRIP-MCNC: 30 MG/DL
HCO3 SERPL-SCNC: 22 MMOL/L (ref 22–29)
HCT VFR BLD AUTO: 29.4 % (ref 35–47)
HGB BLD-MCNC: 8.7 G/DL (ref 11.7–15.7)
HGB UR QL STRIP: ABNORMAL
KETONES UR STRIP-MCNC: NEGATIVE MG/DL
LEUKOCYTE ESTERASE UR QL STRIP: ABNORMAL
M TB IFN-G BLD-IMP: NEGATIVE
M TB IFN-G CD4+ BCKGRND COR BLD-ACNC: 9.84 IU/ML
MCH RBC QN AUTO: 27 PG (ref 26.5–33)
MCHC RBC AUTO-ENTMCNC: 29.6 G/DL (ref 31.5–36.5)
MCV RBC AUTO: 91 FL (ref 78–100)
MITOGEN IGNF BCKGRD COR BLD-ACNC: -0.01 IU/ML
MITOGEN IGNF BCKGRD COR BLD-ACNC: 0 IU/ML
NITRATE UR QL: NEGATIVE
OBSERVATION IMP: NEGATIVE
PH UR STRIP: 5.5 [PH] (ref 5–7)
PLATELET # BLD AUTO: 376 10E3/UL (ref 150–450)
POTASSIUM SERPL-SCNC: 3.9 MMOL/L (ref 3.4–5.3)
PROT SERPL-MCNC: 5.9 G/DL (ref 6.4–8.3)
RBC # BLD AUTO: 3.22 10E6/UL (ref 3.8–5.2)
RBC URINE: 4 /HPF
SODIUM SERPL-SCNC: 142 MMOL/L (ref 135–145)
SP GR UR STRIP: 1.03 (ref 1–1.03)
SQUAMOUS EPITHELIAL: 1 /HPF
T GONDII IGG SER-ACNC: 5 IU/ML
T GONDII IGM SER-ACNC: <3 AU/ML
UROBILINOGEN UR STRIP-MCNC: NORMAL MG/DL
WBC # BLD AUTO: 6.8 10E3/UL (ref 4–11)
WBC CLUMPS #/AREA URNS HPF: PRESENT /HPF
WBC URINE: >182 /HPF

## 2025-01-22 PROCEDURE — 74177 CT ABD & PELVIS W/CONTRAST: CPT | Mod: 26 | Performed by: RADIOLOGY

## 2025-01-22 PROCEDURE — 80053 COMPREHEN METABOLIC PANEL: CPT

## 2025-01-22 PROCEDURE — 999N000128 HC STATISTIC PERIPHERAL IV START W/O US GUIDANCE

## 2025-01-22 PROCEDURE — 81001 URINALYSIS AUTO W/SCOPE: CPT

## 2025-01-22 PROCEDURE — 97116 GAIT TRAINING THERAPY: CPT | Mod: GP

## 2025-01-22 PROCEDURE — 36415 COLL VENOUS BLD VENIPUNCTURE: CPT

## 2025-01-22 PROCEDURE — 99207 PR APP CREDIT; MD BILLING SHARED VISIT: CPT | Mod: FS | Performed by: STUDENT IN AN ORGANIZED HEALTH CARE EDUCATION/TRAINING PROGRAM

## 2025-01-22 PROCEDURE — 250N000011 HC RX IP 250 OP 636

## 2025-01-22 PROCEDURE — 87086 URINE CULTURE/COLONY COUNT: CPT

## 2025-01-22 PROCEDURE — 74177 CT ABD & PELVIS W/CONTRAST: CPT

## 2025-01-22 PROCEDURE — 250N000013 HC RX MED GY IP 250 OP 250 PS 637

## 2025-01-22 PROCEDURE — 120N000002 HC R&B MED SURG/OB UMMC

## 2025-01-22 PROCEDURE — 80048 BASIC METABOLIC PNL TOTAL CA: CPT

## 2025-01-22 PROCEDURE — 99233 SBSQ HOSP IP/OBS HIGH 50: CPT

## 2025-01-22 PROCEDURE — 99233 SBSQ HOSP IP/OBS HIGH 50: CPT | Mod: GC | Performed by: STUDENT IN AN ORGANIZED HEALTH CARE EDUCATION/TRAINING PROGRAM

## 2025-01-22 PROCEDURE — 85014 HEMATOCRIT: CPT

## 2025-01-22 PROCEDURE — 82248 BILIRUBIN DIRECT: CPT | Performed by: STUDENT IN AN ORGANIZED HEALTH CARE EDUCATION/TRAINING PROGRAM

## 2025-01-22 PROCEDURE — 250N000013 HC RX MED GY IP 250 OP 250 PS 637: Performed by: NURSE PRACTITIONER

## 2025-01-22 PROCEDURE — 250N000012 HC RX MED GY IP 250 OP 636 PS 637

## 2025-01-22 PROCEDURE — 258N000003 HC RX IP 258 OP 636

## 2025-01-22 PROCEDURE — 85041 AUTOMATED RBC COUNT: CPT

## 2025-01-22 PROCEDURE — 82040 ASSAY OF SERUM ALBUMIN: CPT | Performed by: STUDENT IN AN ORGANIZED HEALTH CARE EDUCATION/TRAINING PROGRAM

## 2025-01-22 PROCEDURE — 71260 CT THORAX DX C+: CPT | Mod: 26 | Performed by: RADIOLOGY

## 2025-01-22 PROCEDURE — 71260 CT THORAX DX C+: CPT

## 2025-01-22 RX ORDER — IOPAMIDOL 755 MG/ML
79 INJECTION, SOLUTION INTRAVASCULAR ONCE
Status: COMPLETED | OUTPATIENT
Start: 2025-01-22 | End: 2025-01-22

## 2025-01-22 RX ADMIN — IOPAMIDOL 84 ML: 755 INJECTION, SOLUTION INTRAVENOUS at 11:23

## 2025-01-22 RX ADMIN — OMEGA-3-ACID ETHYL ESTERS 2 G: 1 CAPSULE, LIQUID FILLED ORAL at 08:35

## 2025-01-22 RX ADMIN — APIXABAN 2.5 MG: 2.5 TABLET, FILM COATED ORAL at 21:05

## 2025-01-22 RX ADMIN — OMEGA-3-ACID ETHYL ESTERS 2 G: 1 CAPSULE, LIQUID FILLED ORAL at 21:06

## 2025-01-22 RX ADMIN — LEVOTHYROXINE SODIUM 175 MCG: 0.17 TABLET ORAL at 07:07

## 2025-01-22 RX ADMIN — AMLODIPINE BESYLATE 2.5 MG: 2.5 TABLET ORAL at 08:37

## 2025-01-22 RX ADMIN — HYALURONIDASE (HUMAN RECOMBINANT) 150 UNITS: 150 INJECTION, SOLUTION SUBCUTANEOUS at 12:35

## 2025-01-22 RX ADMIN — Medication 1 CAPSULE: at 21:05

## 2025-01-22 RX ADMIN — URSODIOL 250 MG: 250 TABLET, FILM COATED ORAL at 08:36

## 2025-01-22 RX ADMIN — PREDNISONE 10 MG: 5 TABLET ORAL at 08:36

## 2025-01-22 RX ADMIN — SIROLIMUS 1 MG: 1 TABLET ORAL at 08:34

## 2025-01-22 RX ADMIN — APIXABAN 2.5 MG: 2.5 TABLET, FILM COATED ORAL at 08:37

## 2025-01-22 RX ADMIN — SODIUM CHLORIDE, POTASSIUM CHLORIDE, SODIUM LACTATE AND CALCIUM CHLORIDE 250 ML: 600; 310; 30; 20 INJECTION, SOLUTION INTRAVENOUS at 10:06

## 2025-01-22 RX ADMIN — ACETAMINOPHEN 650 MG: 325 TABLET, FILM COATED ORAL at 13:20

## 2025-01-22 RX ADMIN — METOPROLOL SUCCINATE 25 MG: 25 TABLET, EXTENDED RELEASE ORAL at 08:36

## 2025-01-22 RX ADMIN — GABAPENTIN 300 MG: 250 SOLUTION ORAL at 21:05

## 2025-01-22 RX ADMIN — URSODIOL 250 MG: 250 TABLET, FILM COATED ORAL at 21:04

## 2025-01-22 RX ADMIN — CALCITRIOL CAPSULES 0.25 MCG 0.25 MCG: 0.25 CAPSULE ORAL at 08:36

## 2025-01-22 RX ADMIN — Medication 1 CAPSULE: at 08:34

## 2025-01-22 RX ADMIN — EZETIMIBE 10 MG: 10 TABLET ORAL at 08:34

## 2025-01-22 RX ADMIN — FERROUS SULFATE TAB 325 MG (65 MG ELEMENTAL FE) 325 MG: 325 (65 FE) TAB at 08:36

## 2025-01-22 ASSESSMENT — ACTIVITIES OF DAILY LIVING (ADL)
ADLS_ACUITY_SCORE: 61
ADLS_ACUITY_SCORE: 60
ADLS_ACUITY_SCORE: 61
ADLS_ACUITY_SCORE: 60
ADLS_ACUITY_SCORE: 61

## 2025-01-22 NOTE — PROVIDER NOTIFICATION
Patient arrive back from CT with ice pack on arm. RN was told that IV contrast had extravasated during the CT. RN contacted pharmacy and provider regarding if IV Hyaluronidase was needed per the extravasation protocol. Pharmacy and provider determined it was needed. RN marked the extravasation area, administered the medication and will monitor the extravasation site for worsening signs/symptoms.

## 2025-01-22 NOTE — PROGRESS NOTES
[facility]    Medicine Progress Note - Hospitalist Service    Date of Admission:  1/19/2025    Assessment & Plan   Luz Thompson is a 74 year old female admitted on 1/19/2025.  Medical history notable for primary biliary cirrhosis s/p liver transplant (2002) on chronic immunosuppression, atrial fibrillation, history of CVA, CKD stage III, diabetes mellitus type 2, hypothyroidism, hypertension, PAD.  Presented to emergency department for shortness of breath, hypoxia, fever.     Updates Today:  - UA obtained yesterday with c/f UTI; started on meropenem based on prior ESBL strain sensitivities. Culture returned with c/f contamination and recommended repeating. Will repeat UA with reflex   - follow updated ID recs -> discontinue meropenem, monitor off antimicrobials   - creatinine returned to baseline  - obtain CT, will give small bolus beforehand ISO CKD hx   - has remained afebrile     Hypoxia, Shortness of breath - resolved   Reported fever   Multiple recent hospitalizations   Hx of coccidioidomycosis (2017)  Previous history of fungal infections while on immunosuppression. Recently hospitalized 12/10 to 12/15 for sepsis due to UTI, suspected pyelonephritis.  Subsequently was hospitalized between 1/3-1/5, and 1/6 to 1/14 for, URI, aspiration pneumonia vs HAP.  Patient returned to emergency department on 1/19 for concern of shortness of breath, hypoxia, fever.  Tmax at home 101.8.  EMS found her to be hypotensive 80/55.  No clear source of infection.  Reports off/on fever since recent discharge on 1/14.  In emergency department ,WBC 10.2, Covid/flu negative,  No acute findings on chest x-ray.  Blood cultures and UA ordered.  Current temperature 98.2 and hemodynamically stable.  When assessed in the emergency department, able to maintain saturations in 90s on room air. UA with light yellow urine, trace leukocyte, negative nitrite. Respiratory panel negative (COVID, flu  "A and B, RSV). O2 saturation remained ~ 93% on RA while working with PT. Urine culture <10,000 CFU/mL Mixture of Urogenital Louann.   - follow blood cultures 1/19: gram positive cocci in 1 of 2 bottles (? Skin louann/contaminant vs true infection)  - follow blood cultures 1/20: no growth to date   - transplant infectious disease consulted, appreciate assistance    - obtain chest CT, ideally with contrast (if able)   - make sure patient is up-to-date on age-appropriate cancer screenings   - arrange for ID clinic follow-up (scheduled for 2/12)    - labs in process     Addendum @ 12:49 PM  Extravasation of CT contrast during scan earlier today. Will treat with protocol.     Addendum @ 2:38 PM  CT chest/abdomen/pelvis showing patchy ground glass opacities, trace pleural effusions, chronically partially calcified pulmonary opacities in RLL, no acute intra-abdominal pathology. Spoke with ID team now that CT is read and completed. No change to antimicrobial plan at this time.     Left hip pain - improving  Patient reports left hip pain over the past week.  Does not radiate towards groin.  Worse with ambulation.  Tender to palpation over left trochanter.  Reports history of OA in other joints. Wonders if she \"over did it\" while ambulating with her cane while at home. XR of left hip obtained and without acute osseous abnormality; no substantial joint space loss; acetabular osteophytes suggesting degenerative process.   - multimodal pain control   - encourage mobility as tolerated     C/f UTI  Patient notes urinary frequency and dysuria that started overnight, wonders if she has a UTI. UA obtained and showed large leukocytes, small blood, negative nitrite. Started on meropenem 1/21. Urine culture with urogenital louann however growth consistent with probable contamination. Meropenem stopped 1/22 per ID rec  - repeat UA and follow     Concern for possible deconditioning  Possible difficulty performing ADLs  Patient lives at " independent living.  Patient and mother concerned with patient performing ADLs with current hip pain and shortness of breath.   - PT/OT consulted, appreciate assistance   - home with home PT and OT; home with assist   - social work consulted, appreciate assistance     Hx of primary biliary cirrhosis s/p liver transplant (2002)  Had liver transplant in 2002, unrelated living donor.  Last seen by Dr. Gentry Ramirez 10/2024.   - continue PTA prednisone, sirolimus, ursodiol  - follow up with transplant GI as previously scheduled     DERREK on CKD stage 3b - resolved   Has yet to establish with nephrologist in [place] since moving from [place].  Reports has an appointment in April to establish care.. Baseline creatinine appears to be approximately 1.4.  Upon arrival creatinine 1.80.  Suspected to be secondary to volume depletion.  BMP on 1/20 showing improved  creatinine down to 1.61.   - IVF  - continue to monitor     HTN  Hx of paroxysmal atrial fibrillation  HLD  - continue PTA amlodipine   - continue PTA metoprolol with hold parameters   - continue PTA apixaban  - continue PTA Ezetimibe     DM type 2  Diabetic peripheral neuropathy  Last A1c 6.9 on 12/10/2024.  PTA linagliptin and gabapentin for neuropathy.  - hold PTA linagliptin  - continue PTA gabapentin  - POCT glucose monitoring  - sliding scale insulin   - hypoglycemia protocol      Hypothyroidism  Last TSH 3.31 on 11/14/2024.  - continue PTA synthroid     Discharge Planning:  - follow up with PCP for hospital follow-up and to ensure she is up-to-date on age-appropriate cancer screenings   - will need referral to nephrologist as yet to establish since moving to [place]  - will need referral to ID clinic for follow-up in near future           Diet: Combination Diet Regular Diet Adult  Snacks/Supplements Adult: Ensure Enlive; With Meals    DVT Prophylaxis: DOAC  Ron Catheter: Not present  Lines: None     Cardiac Monitoring: None  Code Status: Full Code      Clinically  "Significant Risk Factors          # Hyperchloremia: Highest Cl = 110 mmol/L in last 2 days, will monitor as appropriate          # Hypoalbuminemia: Lowest albumin = 3.4 g/dL at 1/19/2025  6:08 PM, will monitor as appropriate     # Hypertension: Noted on problem list           # DMII: A1C = 6.9 % (Ref range: <5.7 %) within past 6 months, PRESENT ON ADMISSION  # Overweight: Estimated body mass index is 29.32 kg/m  as calculated from the following:    Height as of this encounter: 1.715 m (5' 7.5\").    Weight as of this encounter: 86.2 kg (190 lb)., PRESENT ON ADMISSION     # Financial/Environmental Concerns: none         Social Drivers of Health    Housing Stability: Low Risk  (1/7/2025)    Housing Stability     Do you have housing? : Yes     Are you worried about losing your housing?: No   Recent Concern: Housing Stability - High Risk (10/12/2024)    Housing Stability     Do you have housing? : No     Are you worried about losing your housing?: No   Tobacco Use: Medium Risk (1/2/2025)    Patient History     Smoking Tobacco Use: Former     Smokeless Tobacco Use: Never   Physical Activity: Insufficiently Active (11/9/2023)    Received from [facility]    Exercise Vital Sign     Days of Exercise per Week: 5 days     Minutes of Exercise per Session: 10 min          Disposition Plan     Medically Ready for Discharge: Anticipated Tomorrow           The patient's care was discussed with the Attending Physician, Dr. Woodard, Bedside Nurse, and Patient.    Katherine Luna NP  Hospitalist Service  [facility]  Securely message with [employer] (more info)  Text page via [facility] Paging/Directory   ______________________________________________________________________    Interval History   [place] was seen resting in bed. No acute events overnight. Required 1-2L overnight again, asymptomatic. Does not want to go home with O2 unless absolutely necessary. Discuss plan for repeating UA and " obtaining CT scan per ID rec. Okay with plan to follow up with ID team in clinic and potential plan for discharge, likely tomorrow. All questions and concerns addressed at this time.     Physical Exam   Vital Signs: Temp: 98  F (36.7  C) Temp src: Oral BP: (!) 124/67 Pulse: (!) 65   Resp: (!) 16 SpO2: 98 % O2 Device: None (Room air) Oxygen Delivery: 1/2 LPM  Weight: 190 lbs 0 oz    GENERAL: Alert and awake. Answering questions appropriately. Oriented x 3. NAD. Pleasant and conversational   HEENT: Anicteric sclera. EOMI. Mucous membranes moist   CARDIOVASCULAR: RRR. S1, S2. No murmurs, rubs, or gallops.   RESPIRATORY: Effort normal on RA. Clear to auscultation bilaterally, no rales, rhonchi or wheezes  GI: Abdomen soft, non-tender abdomen without rebound or guarding, normoactive bowel sounds present  MUSCULOSKELETAL: Moves all extremities.   EXTREMITIES: No peripheral edema. No calf asymmetry, erythema, or tenderness.   NEUROLOGICAL: No focal deficits. Moving all extremities symmetrically.   SKIN: Intact. Warm and dry. No jaundice. No rashes on exposed skin   PSYCH: Affect appropriate     Medical Decision Making       50 MINUTES SPENT BY ME on the date of service doing chart review, history, exam, documentation & further activities per the note.      Data   Imaging results reviewed over the past 24 hrs:   No results found for this or any previous visit (from the past 24 hours).  Recent Labs   Lab 01/22/25  0632 01/22/25  0153 01/21/25  2200 01/21/25  0816 01/21/25  0639 01/20/25  0930 01/20/25  0614 01/19/25  2304 01/19/25  1808   WBC 6.8  --   --   --  6.7  --  7.0  --  10.2   HGB 8.7*  --   --   --  8.3*  --  8.7*  --  10.3*   MCV 91  --   --   --  91  --  91  --  88     --   --   --  351  --  361  --  397     --   --   --  142  --  141  --  139   POTASSIUM 3.9  --   --   --  4.0  --  4.2  --  3.9   CHLORIDE 109*  --   --   --  110*  --  108*  --  102   CO2 22  --   --   --  21*  --  22  --  24   BUN  33.0*  --   --   --  37.6*  --  35.0*  --  33.7*   CR 1.40*  --   --   --  1.56*  --  1.61*  --  1.80*   ANIONGAP 11  --   --   --  11  --  11  --  13   KEILY 9.3  --   --   --  9.0  --  8.6*  --  8.9   * 131* 164*   < > 133*   < > 128*   < > 114*   ALBUMIN  --   --   --   --   --   --   --   --  3.4*   PROTTOTAL  --   --   --   --   --   --   --   --  6.4   BILITOTAL  --   --   --   --   --   --   --   --  0.4   ALKPHOS  --   --   --   --   --   --   --   --  113   ALT  --   --   --   --   --   --   --   --  29   AST  --   --   --   --   --   --   --   --  32    < > = values in this interval not displayed.

## 2025-01-22 NOTE — PLAN OF CARE
Goal Outcome Evaluation:      Plan of Care Reviewed With: patient    Overall Patient Progress: improving    Pt is alert and oriented x4. Has no c/o pain. Pt in bed all shift. VSS, has SOBOE. O2 sat is 96% on room air. Has pure wick on and voiding well. PIV/SL. Will continue with poc.

## 2025-01-22 NOTE — PLAN OF CARE
"Goal Outcome Evaluation:    Shift: [id number]    Neuro: WNL, A&Ox4  Cardiac: hx afib on eliquis. Denies chest pain  Respiratory: reports SOB on exertion. On 1 L O2 overnight with sats 96-98%  GI/: pt currently has a purwick. Bedside commode in room.   Diet/appetite: Regular diet. Good appetite.   Activity: up w/ SBA-1 and walker  Pain: Denies pain whole shift. Has PRN tylenol  Skin: WNL except dry and generalized bruises  Lines: PIV saline locked.   Plan: IV antibiotics. Awaiting urine cultures.    /47 (BP Location: Left arm)   Pulse 67   Temp 98.1  F (36.7  C) (Oral)   Resp 16   Ht 1.715 m (5' 7.5\")   Wt 86.2 kg (190 lb)   LMP 06/01/1988 (Approximate)   SpO2 99%   BMI 29.32 kg/m             "

## 2025-01-22 NOTE — PROGRESS NOTES
Transplant & Immunocompromised   Infectious Disease     Inpatient Progress Note   Patient: Luz Thompson, Date of birth 1950, Medical record number [id number].     Assessment and Recommendations     Impression: Fever of unknown origin in transplant patient.    Recommendations  Would discontinue Meropenem  Would prefer to monitor off antimicrobial unless patient is clinically worsening (sepsis or high grade fever and suspected source of infection.)  If broad spectrum antibiotics are being consider, ID team is always available to discuss further if needed  Will follow-up results of CT chest  If suggestive of worsening pulmonary coccidioides, will plan to start empiric Voriconazole  If stable, will follow-up coccidioides antigen before making decision regarding antifungals  Please see if patient has coverage for isavuconazole, as this may be needed in the future  Patient has ID appointment scheduled on Feb 12th, please see if transport can be arranged to help facilitate her getting to the appointment    ID will continue to follow, please contact (page if time sensitive, vocera message if non urgent) with questions or if any situation arises where we may be of addional assistance.    Signed:  Madhu Reece MD, 01/22/2025   Infectious Disease Fellow  Pager [phone] if urgent or Vocera if non time sensitive  For coverage and paging info see [employer]-> Infectious Disease Medicine Adult/[facility]        Patient Summary:   Transplants:  5/22/2002 (Liver); POD  8281.  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       ID Problem List and Discussion:   Fever of unknown origin  Reports baseline temperature is 97.6, temperatures have been in the 99's during previous hospitalization and at home with Tmax 101.8 on home thermometer. Recent admissions with UTI (12/10-12/15), fever (1/3-1/5) and hospital acquired pneumonia (1/6-1/14). On review of previous admission fever curves temperatures . No change in fever curve during 7 day course of meropenem or previous UTI treatment. This, along with the presence of fevers x2 months makes a typical bacterial process unlikely. UA is not c/w infection and has no urinary symptoms.  Patient with history of prior Coccidioides infection in 2017. Previously on voriconazole for prophylaxis, stopped in June 2024.  Prior CT imaging with residual nodular disease, but none recently. Testing for Coccidioides antigen. Would additionally recommend obtaining CT scan to assess for stability of lung lesions.   Testing pending for atypical bacterial infections including NTM's and TB, viral infections, and other fungal infections although all seemingly less likely  Unclear if infectious source will be identified, would consider evaluation into occult malignant sources of fever as well as rheumatologic sources  We will arrange for infectious disease follow-up in the outpatient setting for continued evaluation    Possible urinary tract infection 1/21/2025  During hospitalization, patient reported onset of increased urinary frequency and some burning at the end of urination starting in the morning on 1/21/2025.  Symptoms were mild, but patient does have history of recurrent UTIs.  Urinalysis has been collected on admission 1/19 with 7 WBCs noted.  Urine culture at that time was negative.  Repeat urinalysis was performed on 1/21 now with greater than 182 WBCs.  Of note it appears as though urine was collected via pure wick catheter, and although this was reportedly  done with new tubing and canister, would not consider this a clean-catch specimen.  Subsequent urine culture with greater than 100,000 CFU's of mixed urogenital louann.  Patient started empirically on meropenem given history of MDRO's on 1/21 in the evening.    When seen on 1/22, patient states dysuria and increased frequency have resolved.  She states the symptoms improved sometime in the afternoon/evening the day before.  She has remained afebrile throughout her hospitalization.  I have relatively low suspicion the patient symptoms were due to underlying urinary tract infection given that her symptoms have already resolved, sometime either shortly before shortly after initiation of meropenem.  Noted plans for repeat UA and culture and think this is reasonable given likely contaminated specimen prior.  Will be difficult to interpret results considering she has now received broad-spectrum antimicrobials.  Would recommend discontinuation of meropenem at this time and monitor off antimicrobial therapy.  Given patient's overall clinical stability, if ongoing concern for UTI, would recommend waiting for urine culture to guide targeted therapy     Staphylococcus epidermitis isolated in blood culture 1/4 bottles on 1/19/2025  Isolated on admission blood cultures. Patient not febrile at the time. Only 1 of 4 bottles were positive. Repeat cultures were collected on 1/20/2025 without antibiotics being given. That set is currently no growth to date. Suspect likely a contaminant at this point and will continue to monitor off therapy.     Hx coccidioides  Dx in winter 9026-8307, did not tolerate fluconazole due to severe rash. Treated with voriconazole, continued until her return to [place]. CXR with stable calcifications. Recent fungal antibody panel negative though unclear how reliable this is. See above for additional details     Hx EBV viremia s/p rituxan 2016  Not checked for several years, no lymphadenopathy on recent  "abdomen/pelvis CT     Numerous reported antimicrobial allergies  Per allergy note 8/2/2019 \"Prick and intradermal and patch testing to fluconazole, doxycycline, azithromycin, penicillins, and cephalosporins with immediate and delayed readings being negative. \" States she will never take fluconazole again. Would be candidate for oral provocation testing in future with pcn.      Previous ID Issues:  - Recurrent UTIs, several c/b bacteremia. 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 Has fosfomycin prn at onset of sx  - Coccidioides infection (dx 2016)  - EBV viremia - moderate, treated with Rituxan (8/31/16)  - Left otitis, followed with ENT due to hx perforation  - Hx angular chelitis and thrush     Other ID issues:  - QTc interval:  430msec on 1/6/25  - Bacterial prophylaxis:  none  - Pneumocystis prophylaxis:  none  - Viral serostatus: EBV+, CMV-  - Viral prophylaxis:  none  - Fungal prophylaxis:  none, previously voriconazole  - Immunosuppression: prednisone 10mg daily, serolimus  - Isolation status: Droplet (pending parvo)      Selected Labs, Micro, Imaging Results:   Blood cultures  1/20: NGTD    1/19: 1/4 bottles with Staph epi (suspected contaminant)    1/7: NGTD    1/6: NGTD    1/3: NGTD    Urine cultures 1/21: >100,000 Mixed Urogenital Rashmi    1/19: <10,000 Mixed Urogenital Rashmi    1/10: <10,000 Mixed Urogenital Rashmi    12/10:  >100,000 Klebisiella pneumoniae    Respiratory Viral Panel 1/20: Negative  Cryptococcal Ag 1/20: Negative  Quantiferon 1/20: Negative  Treponema Ab 1/20: Non-reactive  CMV PCR 1/20: Negative  EBV PCR 1/20: Negative  Histo Ab 1/13: Negative  Histo Ag 1/13: Negative  Blasto Ag 1/13: Negative  Coccidioides Ab 1/13: Negative    Pending:  AFB Blood culture  Cocci Ag  Toxoplasma Serologies  Parvovirus      Interval/Subjective     Remains afebrile  No new complaints this AM  Yesterday, patient reported some burning at end of urination and some increased frequency. This began yesterday " morning and patient feels this is associated with the Purewick catheter. Symptoms have since resolved as of yesterday afternoon/evening  Meropenem started overnight due to concern for UTI    Review of Symptoms.  A comprehensive 14 system review of symptoms was conducted and was otherwise negative (unless mentioned above)       Antimicrobials:     Meropenem 1/21 - current       Physical Exam:     Temp: 98.1  F (36.7  C) Temp src: Oral BP: 131/55 Pulse: 73   Resp: 15 SpO2: 93 % O2 Device: None (Room air) Oxygen Delivery: 1/2 LPM     PHYSICAL EXAM:  GENERAL:  Well-developed, well-nourished, sitting in bed in no acute distress. Very pleasant  ENT:  Head is normocephalic, atraumatic. Oropharynx is moist without exudates or ulcers.  EYES:  Eyes have anicteric sclerae.   NECK:  Supple.  LUNGS:  Unlabored breathing. Clear to auscultation.  CARDIOVASCULAR:  Regular rate and rhythm with no murmurs, rubs, or gallops.  ABDOMEN:  Non-distended, soft, nontender.  EXT: Extremities warm and well perfused. No edema.  SKIN:  No acute rashes.   NEUROLOGIC:  Awake, alert, interactive.

## 2025-01-22 NOTE — PROGRESS NOTES
[facility] Medicine Cross Cover Note    January 21, 2025 9:33 PM    I was asked to follow-up on ID recommendations.  Noted recommendations for chest CT, preferably with contrast.    Also noted new urinalysis results with small blood, large leukocyte esterase, greater than 182 WBCs.  Per the notes, this was ordered due to patient's report of urinary symptoms of frequency, urgency.  I reached out to bedside RN who confirmed with the patient the collection technique which was used for the UA earlier in the afternoon.  This was collected via pure wick container but was brand-new set up with new tubing, new PureWick, new container. I plan to treat this as UTI.    It is difficult to choose an antibiotic for this patient that she has multiple antibiotic allergies listed.  Also, she has a recent history of Klebsiella oxytoca ESBL in both August 2024 and September 2024.  In December 2024, she had Klebsiella pneumoniae UTI (not ESBL).    In this immunosuppressed patient with history of multiple drug-resistant infections and symptomatic UTI, I will start meropenem tonight.  Hopefully this can be quickly de-escalated when urine culture is speciated.  Patient has history of bacteremia and would be hesitant to potentially under-treat her in case of another ESBL.    Regarding chest CT recommendations, I did not order this tonight as I am unclear if it is still  desired now that we have a suspected source of infection or if these are 2 separate concerns.  Also, unsure if we may want to include the pelvis in imaging given our new info with positive UA or chest only. As chest CT request was non-urgent, I will leave this for day team to determine for best resource utilization.    Action taken:   -meropenem ordered. She previously tolerated this and her last two ESBL strains were sensitive to this.  -prn oral bendaryl order if patient would like this prior to antibiotic    Communicated plan via secure messaging.     I spent 25 minutes on  the date of the encounter doing chart review, documentation, and further activities as noted above. There was no face-to-face patient interaction with this encounter.    LIZ Weinstein CNP on 1/21/2025 at 9:33 PM

## 2025-01-22 NOTE — PLAN OF CARE
Goal Outcome Evaluation:      Plan of Care Reviewed With: patient    Overall Patient Progress: improvingOverall Patient Progress: improving    Outcome Evaluation: Cares from 1189-5101, continue with POC    Status: admitted for fever, DERREK, hypoxia, L hip pain   Vitals: VSS except hypertensive, not in paging parameters. Sats 88%, 1/2L O2 applied now sating 94%  Neuros: A&Ox4, WNL, [place] baseline  IV: L PIV SL. R PIV extravagated IV contrast while at CT- provider notified and Hylenex ordered and given- extravagation site improving    Labs/Electrolytes: ACHS BG, refuses insulin when needed, last   Resp: SOB with exertion   Diet: Regular, tolerating well. Denies nausea  GI: last BM 1/20  : voids via purwick, urine sample collected  Skin: extravasation site intact with dry gauze covering, continuing to monitor  Pain: PRN tylenol given x1  Activity: assist x1 GBW  Plan: continue with POC  Updates this shift: chest CT completed

## 2025-01-23 VITALS
DIASTOLIC BLOOD PRESSURE: 60 MMHG | RESPIRATION RATE: 20 BRPM | SYSTOLIC BLOOD PRESSURE: 136 MMHG | BODY MASS INDEX: 28.79 KG/M2 | OXYGEN SATURATION: 95 % | TEMPERATURE: 98 F | HEART RATE: 79 BPM | WEIGHT: 190 LBS | HEIGHT: 68 IN

## 2025-01-23 LAB
ANION GAP SERPL CALCULATED.3IONS-SCNC: 11 MMOL/L (ref 7–15)
BACTERIA BLD CULT: NORMAL
BACTERIA UR CULT: NORMAL
BUN SERPL-MCNC: 32.6 MG/DL (ref 8–23)
CALCIUM SERPL-MCNC: 9.6 MG/DL (ref 8.8–10.4)
CHLORIDE SERPL-SCNC: 110 MMOL/L (ref 98–107)
CREAT SERPL-MCNC: 1.37 MG/DL (ref 0.51–0.95)
EGFRCR SERPLBLD CKD-EPI 2021: 40 ML/MIN/1.73M2
ERYTHROCYTE [DISTWIDTH] IN BLOOD BY AUTOMATED COUNT: 15.6 % (ref 10–15)
GLUCOSE BLDC GLUCOMTR-MCNC: 117 MG/DL (ref 70–99)
GLUCOSE SERPL-MCNC: 126 MG/DL (ref 70–99)
HCO3 SERPL-SCNC: 21 MMOL/L (ref 22–29)
HCT VFR BLD AUTO: 31.5 % (ref 35–47)
HGB BLD-MCNC: 9.1 G/DL (ref 11.7–15.7)
MCH RBC QN AUTO: 27 PG (ref 26.5–33)
MCHC RBC AUTO-ENTMCNC: 28.9 G/DL (ref 31.5–36.5)
MCV RBC AUTO: 94 FL (ref 78–100)
PLATELET # BLD AUTO: 394 10E3/UL (ref 150–450)
POTASSIUM SERPL-SCNC: 4.2 MMOL/L (ref 3.4–5.3)
RBC # BLD AUTO: 3.37 10E6/UL (ref 3.8–5.2)
SODIUM SERPL-SCNC: 142 MMOL/L (ref 135–145)
WBC # BLD AUTO: 7.5 10E3/UL (ref 4–11)

## 2025-01-23 PROCEDURE — 99207 PR NO CHARGE LOS: CPT | Performed by: STUDENT IN AN ORGANIZED HEALTH CARE EDUCATION/TRAINING PROGRAM

## 2025-01-23 PROCEDURE — 250N000012 HC RX MED GY IP 250 OP 636 PS 637

## 2025-01-23 PROCEDURE — 250N000013 HC RX MED GY IP 250 OP 250 PS 637: Performed by: NURSE PRACTITIONER

## 2025-01-23 PROCEDURE — 250N000013 HC RX MED GY IP 250 OP 250 PS 637

## 2025-01-23 PROCEDURE — 36415 COLL VENOUS BLD VENIPUNCTURE: CPT

## 2025-01-23 PROCEDURE — 99239 HOSP IP/OBS DSCHRG MGMT >30: CPT | Mod: FS | Performed by: STUDENT IN AN ORGANIZED HEALTH CARE EDUCATION/TRAINING PROGRAM

## 2025-01-23 PROCEDURE — 82310 ASSAY OF CALCIUM: CPT

## 2025-01-23 PROCEDURE — 99207 PR APP CREDIT; MD BILLING SHARED VISIT: CPT | Mod: FS

## 2025-01-23 PROCEDURE — 80048 BASIC METABOLIC PNL TOTAL CA: CPT

## 2025-01-23 PROCEDURE — 85027 COMPLETE CBC AUTOMATED: CPT

## 2025-01-23 RX ADMIN — CALCITRIOL CAPSULES 0.25 MCG 0.25 MCG: 0.25 CAPSULE ORAL at 08:17

## 2025-01-23 RX ADMIN — AMLODIPINE BESYLATE 2.5 MG: 2.5 TABLET ORAL at 08:16

## 2025-01-23 RX ADMIN — EZETIMIBE 10 MG: 10 TABLET ORAL at 08:21

## 2025-01-23 RX ADMIN — LEVOTHYROXINE SODIUM 175 MCG: 0.17 TABLET ORAL at 08:14

## 2025-01-23 RX ADMIN — OMEGA-3-ACID ETHYL ESTERS 1 G: 1 CAPSULE, LIQUID FILLED ORAL at 08:22

## 2025-01-23 RX ADMIN — PREDNISONE 10 MG: 5 TABLET ORAL at 08:16

## 2025-01-23 RX ADMIN — URSODIOL 250 MG: 250 TABLET, FILM COATED ORAL at 08:15

## 2025-01-23 RX ADMIN — FERROUS SULFATE TAB 325 MG (65 MG ELEMENTAL FE) 325 MG: 325 (65 FE) TAB at 08:16

## 2025-01-23 RX ADMIN — METOPROLOL SUCCINATE 25 MG: 25 TABLET, EXTENDED RELEASE ORAL at 08:17

## 2025-01-23 RX ADMIN — APIXABAN 2.5 MG: 2.5 TABLET, FILM COATED ORAL at 08:16

## 2025-01-23 RX ADMIN — SIROLIMUS 1 MG: 1 TABLET ORAL at 08:15

## 2025-01-23 RX ADMIN — Medication 1 CAPSULE: at 08:15

## 2025-01-23 ASSESSMENT — ACTIVITIES OF DAILY LIVING (ADL)
ADLS_ACUITY_SCORE: 60

## 2025-01-23 NOTE — PLAN OF CARE
"Shift: 9639-4370  VS: Blood pressure 136/60, pulse 79, temperature 98  F (36.7  C), temperature source Oral, resp. rate 20, height 1.715 m (5' 7.5\"), weight 86.2 kg (190 lb), last menstrual period 06/01/1988, SpO2 95%, not currently breastfeeding.  Pain: Denies pain at this time  Neuro: A x O 4. PERRLA. Calls appropriately and makes needs known. [place] at baseline, hearing aids with pt  Cardiac: WDL. No cardiac related chest pain   Respiratory: dyspnea on exertion. O2 sat > 94% on 0.5 L of O2 NC. When pt woke up on RA O2 sat >96%  GI/Diet/Appetite:  Denies N/V. Regular diet. 1 BM on shift   : voids via purewick  LDA's: PIV SL  Skin: extravasation site intact, slightly swollen, continuing to monitor   Activity: A x 1/ SBA with walker   Tests/Procedures: n/A  Pertinent Labs/Lab Collection: BG ACHS     Plan:  No significant changes this shift, continue POC.                         "

## 2025-01-23 NOTE — PROVIDER NOTIFICATION
Patient's After Visit Summary was reviewed with patient.  Patient verbalized understanding of After Visit Summary, recommended follow up and was given an opportunity to ask questions.   Discharge medications sent home with patient/family: Not applicable   Discharged with daughter    Peripheral IV was removed.

## 2025-01-23 NOTE — CONSULTS
Care Management Discharge Note    Discharge Date: 01/23/2025       Discharge Disposition: [facility] , Home Care    Discharge Services: Home Care    Discharge DME: None    Discharge Transportation: family or friend will provide    Private pay costs discussed: Not applicable    Does the patient's insurance plan have a 3 day qualifying hospital stay waiver?  No    PAS Confirmation Code:  Not applicable   Patient/family educated on Medicare website which has current facility and service quality ratings: no    Education Provided on the Discharge Plan: Yes  Persons Notified of Discharge Plan: Pt's home care   Patient/Family in Agreement with the Plan: yes    Handoff Referral Completed: No, handoff not indicated or clinically appropriate    Additional Information:  Pt is ready for discharge today. Pt's plan is to return to her [facility] with her daughter providing discharge transportation. NADEEM faxed discharge orders to [facility]([phone]).

## 2025-01-23 NOTE — PLAN OF CARE
Physical Therapy Discharge Summary    Reason for therapy discharge:    Discharged to home with home therapy.    Progress towards therapy goal(s). See goals on Care Plan in [facility] electronic health record for goal details.  Goals partially met.  Barriers to achieving goals:   discharge from facility.    Therapy recommendation(s):    Continued therapy is recommended.  Rationale/Recommendations:  [facility].

## 2025-01-23 NOTE — PROGRESS NOTES
Transplant & Immunocompromised   Infectious Disease     Inpatient Progress Note   Patient: Luz Thompson, Date of birth 1950, Medical record number [id number].     Assessment and Recommendations     Impression: Fever of unknown origin in transplant patient.    Recommendations  CT scan reviewed, some GGOs, but no new lesions overly concerning for coccidioides reactivation  Will follow-up coccidioides antigen  If positive, plan to start Voriconazole 400mg twice daily  LFTs and EKG checked this admission  Please see if patient has coverage for isavuconazole, as this may be needed in the future  Patient has ID appointment scheduled on Feb 12th    ID will sign off, please contact (page if time sensitive, vocera message if non urgent) with questions or if any situation arises where we may be of addional assistance.    Signed:  Madhu Reece MD, 01/23/2025   Infectious Disease Fellow  Pager [phone] if urgent or Vocera if non time sensitive  For coverage and paging info see [employer]-> Infectious Disease Medicine Adult/[facility]        Patient Summary:   Transplants:  5/22/2002 (Liver); POD  8282.  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       ID Problem List and Discussion:   Fever of unknown origin  Reports baseline temperature is 97.6, temperatures have been in the 99's during previous hospitalization and at home with Tmax 101.8 on home thermometer. Recent admissions with UTI (12/10-12/15), fever (1/3-1/5) and hospital acquired pneumonia (1/6-1/14). On review of  "previous admission fever curves temperatures . No change in fever curve during 7 day course of meropenem or previous UTI treatment. This, along with the presence of fevers x2 months makes a typical bacterial process unlikely. UA is not c/w infection and has no urinary symptoms.  Patient with history of prior Coccidioides infection in 2017. Previously on voriconazole for prophylaxis, stopped in June 2024.  Prior CT imaging with residual nodular disease, but none recently. Testing for Coccidioides antigen. Repeat CT scan this admission with some scattered GGOs, but overall stable appearance of previously identified nodular lesions  Unclear if infectious source will be identified, would consider evaluation into occult malignant sources of fever as well as rheumatologic sources  We will follow up with patient in ID clinic, scheduled on 2/12/2025 currently     Staphylococcus epidermitis isolated in blood culture 1/4 bottles on 1/19/2025  Isolated on admission blood cultures. Patient not febrile at the time. Only 1 of 4 bottles were positive. Repeat cultures were collected on 1/20/2025 without antibiotics being given. That set is currently no growth to date. Suspect likely a contaminant at this point and will continue to monitor off therapy.     Hx coccidioides  Dx in winter [id number], did not tolerate fluconazole due to severe rash. Treated with voriconazole, continued until her return to [place]. CXR with stable calcifications. Recent fungal antibody panel negative though unclear how reliable this is. See above for additional details     Hx EBV viremia s/p rituxan 2016  Not checked for several years, no lymphadenopathy on recent abdomen/pelvis CT     Numerous reported antimicrobial allergies  Per allergy note 8/2/2019 \"Prick and intradermal and patch testing to fluconazole, doxycycline, azithromycin, penicillins, and cephalosporins with immediate and delayed readings being negative. \" States she will never take " fluconazole again. Would be candidate for oral provocation testing in future with pcn.      Previous ID Issues:  - Recurrent UTIs, several c/b bacteremia. 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 Has fosfomycin prn at onset of sx  - Coccidioides infection (dx 2016)  - EBV viremia - moderate, treated with Rituxan (8/31/16)  - Left otitis, followed with ENT due to hx perforation  - Hx angular chelitis and thrush     Other ID issues:  - QTc interval:  430msec on 1/6/25  - Bacterial prophylaxis:  none  - Pneumocystis prophylaxis:  none  - Viral serostatus: EBV+, CMV-  - Viral prophylaxis:  none  - Fungal prophylaxis:  none, previously voriconazole  - Immunosuppression: prednisone 10mg daily, serolimus  - Isolation status: Standard      Selected Labs, Micro, Imaging Results:   Blood cultures  1/20: NGTD    1/19: 1/4 bottles with Staph epi (suspected contaminant)    1/7: NGTD    1/6: NGTD    1/3: NGTD    Urine cultures 1/21: >100,000 Mixed Urogenital Rashmi    1/19: <10,000 Mixed Urogenital Rashmi    1/10: <10,000 Mixed Urogenital Rashmi    12/10:  >100,000 Klebisiella pneumoniae    Respiratory Viral Panel 1/20: Negative  Cryptococcal Ag 1/20: Negative  Quantiferon 1/20: Negative  Treponema Ab 1/20: Non-reactive  CMV PCR 1/20: Negative  EBV PCR 1/20: Negative  Parvovirus PCR 1/20: Negative  Toxoplasma IgM 1/20: Negative  Histo Ab 1/13: Negative  Histo Ag 1/13: Negative  Blasto Ag 1/13: Negative  Coccidioides Ab 1/13: Negative    Pending:  AFB Blood culture  Cocci Ag      Interval/Subjective     Remains afebrile  No new complaints this AM  Overall feeling well today with no new complaints. Urinary symptoms have resolved    Review of Symptoms.  A comprehensive 14 system review of symptoms was conducted and was otherwise negative (unless mentioned above)       Antimicrobials:     Meropenem 1/21       Physical Exam:     Temp: 98  F (36.7  C) Temp src: Oral BP: 136/60 Pulse: 79   Resp: 20 SpO2: 95 % O2 Device: None (Room  air) Oxygen Delivery: 1/2 LPM     PHYSICAL EXAM:  GENERAL:  Well-developed, well-nourished, sitting in bed in no acute distress. Very pleasant  ENT:  Head is normocephalic, atraumatic. Oropharynx is moist without exudates or ulcers.  EYES:  Eyes have anicteric sclerae.   NECK:  Supple.  LUNGS:  Unlabored breathing. Clear to auscultation.  CARDIOVASCULAR:  Regular rate and rhythm with no murmurs, rubs, or gallops.  ABDOMEN:  Non-distended, soft, nontender.  EXT: Extremities warm and well perfused. No edema.  SKIN:  No acute rashes.   NEUROLOGIC:  Awake, alert, interactive.

## 2025-01-23 NOTE — DISCHARGE SUMMARY
"[facility]  Hospitalist Discharge Summary      Date of Admission:  1/19/2025  Date of Discharge:  1/23/2025  Discharging Provider: Katherine Luna NP  Discharge Service: Hospitalist Service    Discharge Diagnoses   Hypoxia, Shortness of breath   Reported fever   Multiple recent hospitalizations   Hx of coccidioidomycosis (2017)  Extravasation of CT Contrast (1/22/2025)  Left hip pain   C/f UTI  Concern for possible deconditioning  Possible difficulty performing ADLs  Hx of primary biliary cirrhosis s/p liver transplant (2002)  DERREK on CKD stage 3b   HTN  Hx of paroxysmal atrial fibrillation  HLD  DM type 2  Diabetic peripheral neuropathy  Hypothyroidism    Clinically Significant Risk Factors     # DMII: A1C = 6.9 % (Ref range: <5.7 %) within past 6 months    # Overweight: Estimated body mass index is 29.32 kg/m  as calculated from the following:    Height as of this encounter: 1.715 m (5' 7.5\").    Weight as of this encounter: 86.2 kg (190 lb).       Follow-ups Needed After Discharge   Follow-up Appointments       Adult [facility] Follow-up and recommended labs and tests      Follow up with primary care provider, Daniel Hidalgo, within 7 days for hospital follow- up.  No follow up labs or test are needed.      Appointments on [place] and/or [facility] (with [facility] or [facility] provider or service). Call [phone] if you haven't heard regarding these appointments within 7 days of discharge.        Follow Up ([facility])      A referral for a Sleep Study was placed. Someone from the scheduling office will be in contact to help arrange this appointment.     Appointments on [place] and/or [facility] (with [facility] or [facility] provider or service). Call [phone] if you haven't heard regarding these appointments within 7 days of discharge.        Follow Up and recommended labs and tests      Follow up with Infectious Disease clinic at discharge. Their schedulers will " assist in converting your virtual appointment currently scheduled for 2/12/2025 into an in-person appointment. If you don't receive a call from their clinic within 1-2 weeks, please call the clinic.                Unresulted Labs Ordered in the Past 30 Days of this Admission       Date and Time Order Name Status Description    1/22/2025  6:08 PM Urine Culture In process     1/20/2025  5:52 PM Blood Culture Hand, Left Preliminary     1/20/2025  5:52 PM Blood Culture Hand, Right Preliminary     1/20/2025  1:18 PM Coccidioides Agn Quant EIA Blood In process     1/20/2025  1:18 PM Acid-fast Bacilli (AFB) Blood Culture Preliminary     1/19/2025  6:24 PM Blood Culture Peripheral Blood Preliminary         These results will be followed up by Infectious Disease team     Discharge Disposition   Discharged to home  Condition at discharge: Stable    Hospital Course   Luz Thompson is a 74 year old female admitted on 1/19/2025.  Medical history notable for primary biliary cirrhosis s/p liver transplant (2002) on chronic immunosuppression, atrial fibrillation, history of CVA, CKD stage III, diabetes mellitus type 2, hypothyroidism, hypertension, PAD.  Presented to emergency department for shortness of breath, hypoxia, fever.      Hypoxia, Shortness of breath - resolved   Reported fever   Multiple recent hospitalizations   Hx of coccidioidomycosis (2017)  Previous history of fungal infections while on immunosuppression. Recently hospitalized 12/10 to 12/15 for sepsis due to UTI, suspected pyelonephritis.  Subsequently was hospitalized between 1/3-1/5, and 1/6 to 1/14 for, URI, aspiration pneumonia vs HAP.  Patient returned to emergency department on 1/19 for concern of shortness of breath, hypoxia, fever.  Tmax at home 101.8.  EMS found her to be hypotensive 80/55.  No clear source of infection.  Reports off/on fever since recent discharge on 1/14.  In emergency department ,WBC 10.2, Covid/flu negative,  No acute findings on  "chest x-ray.  Blood cultures and UA ordered.  Current temperature 98.2 and hemodynamically stable.  When assessed in the emergency department, able to maintain saturations in 90s on room air. UA with light yellow urine, trace leukocyte, negative nitrite. Respiratory panel negative (COVID, flu A and B, RSV). O2 saturation remained ~ 93% on RA while working with PT. Urine culture <10,000 CFU/mL Mixture of Urogenital Rashmi. CT chest/abdomen/pelvis showing patchy ground glass opacities, trace pleural effusions, chronically partially calcified pulmonary opacities in RLL, no acute intra-abdominal pathology. Blood cultures from 1/19 with gram positive cocci in 1 of 2 bottles (c/f contamination); repeat blood cultures 1/20 without growth to date. Okay to discharge on 1/23 from ID perspective. Please keep a temperature diary with new thermometer and original one and bring to ID follow-up appointment. If you develop UTI symptoms after discharge, okay to start PTA fosfomycin 3g per ID team.      Extravasation of CT Contrast (1/22/2025)  Patient underwent CT scan with contrast on 1/22 with subsequent extravasation into the right antecubital space. Spoke with pharmacy regarding next steps. Contrast extravasation protocol initiated with hyaluronidase injections and area marked for monitoring. Area is soft, largely non-tender, and without erythema. Continue elevation and warm/cold compresses.      Left hip pain - improving  Patient reports left hip pain over the past week.  Does not radiate towards groin.  Worse with ambulation.  Tender to palpation over left trochanter.  Reports history of OA in other joints. Wonders if she \"over did it\" while ambulating with her cane while at home. XR of left hip obtained and without acute osseous abnormality; no substantial joint space loss; acetabular osteophytes suggesting degenerative process.      C/f UTI  Patient notes urinary frequency and dysuria that started overnight, wonders if she " has a UTI. UA obtained and showed large leukocytes, small blood, negative nitrite. Started on meropenem 1/21. Urine culture with urogenital louann however growth consistent with probable contamination. Meropenem stopped 1/22 per ID rec. Repeat UA obtained showing continued WBC however will not start antimicrobials per ID rec, however okay to start PTA fosfomycin if symptoms develop again. ID will follow culture.      Concern for possible deconditioning  Possible difficulty performing ADLs  Patient lives at independent living.  Patient and mother concerned with patient performing ADLs with current hip pain and shortness of breath. Patient has home outpatient PT/OT already following; will resume home services at discharge.       Hx of primary biliary cirrhosis s/p liver transplant (2002)  Had liver transplant in 2002, unrelated living donor.  Last seen by Dr. Gentry Ramirez 10/2024. Continue PTA prednisone, sirolimus, ursodiol. Follow up with transplant GI as previously scheduled.      DERREK on CKD stage 3b - resolved   Has yet to establish with nephrologist in [place] since moving from [place].  Reports has an appointment in April to establish care.. Baseline creatinine appears to be approximately 1.4.  Upon arrival creatinine 1.80.  Suspected to be secondary to volume depletion.  BMP on 1/20 showing improved  creatinine down to 1.61.      HTN  Hx of paroxysmal atrial fibrillation  HLD  Continue PTA amlodipine, metoprolol, apixaban, and Ezetimibe at discharge.      DM type 2  Diabetic peripheral neuropathy  Last A1c 6.9 on 12/10/2024.  PTA linagliptin and gabapentin for neuropathy. Linagliptin held during admission, started on sliding scale insulin in the interim. Okay to resume Linagliptin and continue gabapentin at discharge.      Hypothyroidism  Last TSH 3.31 on 11/14/2024. Continue PTA synthroid at discharge.      Discharge Planning:  - follow up with PCP for hospital follow-up and to ensure she is up-to-date on  age-appropriate cancer screenings   - referral was placed for nephrologist for CKD as she is yet to establish since moving to [place]  - referral was placed for sleep medicine for sleep study       Consultations This Hospital Stay   PHYSICAL THERAPY ADULT IP CONSULT  OCCUPATIONAL THERAPY ADULT IP CONSULT  INFECTIOUS DISEASE TRANSPLANT SOT ADULT IP CONSULT  CARE MANAGEMENT / SOCIAL WORK IP CONSULT  NURSING TO CONSULT FOR VASCULAR ACCESS CARE IP CONSULT  CARE MANAGEMENT / SOCIAL WORK IP CONSULT    Code Status   Full Code    Time Spent on this Encounter   I, Katherine Luna NP, personally saw the patient today and spent greater than 30 minutes discharging this patient.       Katherine Luna NP  [facility] [address]ERV[address]  [address] [place] [address]  Phone: [phone]  Fax: [phone]  ______________________________________________________________________    Physical Exam   Vital Signs: Temp: 98  F (36.7  C) Temp src: Oral BP: 136/60 Pulse: 79   Resp: 20 SpO2: 95 % O2 Device: None (Room air) Oxygen Delivery: 1/2 LPM  Weight: 190 lbs 0 oz    GENERAL: Alert and awake. Answering questions appropriately. Oriented x 3. NAD. Pleasant and conversational   HEENT: Anicteric sclera. EOMI. Mucous membranes moist   CARDIOVASCULAR: RRR. S1, S2. No murmurs, rubs, or gallops.   RESPIRATORY: Effort normal on RA. Clear to auscultation bilaterally, no rales, rhonchi or wheezes  GI: Abdomen soft, non-tender abdomen without rebound or guarding, normoactive bowel sounds present  MUSCULOSKELETAL: Moves all extremities.   EXTREMITIES: No peripheral edema. No calf asymmetry, erythema, or tenderness.   NEUROLOGICAL: No focal deficits. Moving all extremities symmetrically.   SKIN: Intact. Warm and dry. No jaundice. No rashes on exposed skin   PSYCH: Affect appropriat       Primary Care Physician   Daniel Hidalgo    Discharge Orders      Primary Care - Care Coordination Referral      Sleep Study Referral       Adult Infectious Disease  Referral      Adult Nephrology  Referral      Reason for your hospital stay    You presented to the ED on 1/19/2025 for shortness of breath, hypoxia, and fever. Lab tests and imaging studies were completed. The Infectious Disease team was consulted for assistance. No new medication changes at this time.     Activity    Your activity upon discharge: activity as tolerated     Adult [facility] Follow-up and recommended labs and tests    Follow up with primary care provider, Daniel Hidalgo, within 7 days for hospital follow- up.  No follow up labs or test are needed.      Appointments on [place] and/or [facility] (with [facility] or [facility] provider or service). Call [phone] if you haven't heard regarding these appointments within 7 days of discharge.     Follow Up and recommended labs and tests    Follow up with Infectious Disease clinic at discharge. Their schedulers will assist in converting your virtual appointment currently scheduled for 2/12/2025 into an in-person appointment. If you don't receive a call from their clinic within 1-2 weeks, please call the clinic.     Resume Home Care Services     Discharge Instructions     Monitor and record    Please monitor your body temperature with your original thermometer and a new one for comparison. Log both of these temperatures in a journal and bring to your next Infectious Disease clinic appointment.     Follow Up ([facility])    A referral for a Sleep Study was placed. Someone from the scheduling office will be in contact to help arrange this appointment.     Appointments on [place] and/or [facility] (with [facility] or [facility] provider or service). Call [phone] if you haven't heard regarding these appointments within 7 days of discharge.     Discharge Instructions    If you develop urinary frequency, burning, etc. after discharge, please start taking your home fosfomycin 3g and notify the Infectious Disease clinic.      Diet    Follow this diet upon discharge: Current Diet:Orders Placed This Encounter      Snacks/Supplements Adult: Ensure Enlive; With Meals      Combination Diet Regular Diet Adult       Significant Results and Procedures   Most Recent 3 CBC's:  Recent Labs   Lab Test 01/23/25  0639 01/22/25  0632 01/21/25  0639   WBC 7.5 6.8 6.7   HGB 9.1* 8.7* 8.3*   MCV 94 91 91    376 351     Most Recent 3 BMP's:  Recent Labs   Lab Test 01/23/25  0734 01/23/25  0639 01/22/25  2112 01/22/25  0853 01/22/25  0632 01/21/25  0816 01/21/25  0639   NA  --  142  --   --  142  --  142   POTASSIUM  --  4.2  --   --  3.9  --  4.0   CHLORIDE  --  110*  --   --  109*  --  110*   CO2  --  21*  --   --  22  --  21*   BUN  --  32.6*  --   --  33.0*  --  37.6*   CR  --  1.37*  --   --  1.40*  --  1.56*   ANIONGAP  --  11  --   --  11  --  11   KEILY  --  9.6  --   --  9.3  --  9.0   * 126* 161*   < > 116*   < > 133*    < > = values in this interval not displayed.   ,   Results for orders placed or performed during the hospital encounter of 01/19/25   XR Chest 2 Views    Narrative    EXAM: XR CHEST 2 VIEWS  LOCATION: [facility]  DATE: 1/19/2025    INDICATION: Hypoxia, shortness of breath and fever  COMPARISON: 1/6/2025      Impression    IMPRESSION: Stable calcifications right base. Implanted cardiac monitor. No acute infiltrate or significant change.   XR Pelvis w Hip Left 1 View    Narrative    2 views left hip radiographs 1/20/2025 3:36 PM    History: Left hip pain    Comparison: CT pelvis 12/10/2024    Findings:    AP view of pelvis,  frog leg lateral  views of the left hip were  obtained.     No acute osseous abnormality.      Osteophytic spurring in the superior lateral edge of bilateral  acetabulum. No substantial joint space loss in the bilateral hips.     Others:    Enthesopathic changes of pelvis and trochanters. Retained surgical  clip in the central pelvis. Degenerative  changes of the visualized  lumbar spine and SI joints. Small focus of calcification along the  right lower quadrant also seen on prior CT within the abdominal wall.  Possible injection granulomas.    Pelvic phlebolith.       Impression    Impression:  1. No acute osseous abnormality.  2. No substantial joint space loss in the bilateral hips. Acetabular  osteophytes suggesting degenerative process.    I have personally reviewed the examination and initial interpretation  and I agree with the findings.    JUTTA ELLERMANN, MD         SYSTEM ID:  [id number]   CT Chest/Abdomen/Pelvis w Contrast    Narrative    EXAMINATION: CT CHEST/ABDOMEN/PELVIS W CONTRAST, 1/22/2025 11:49 AM    INDICATION:   Fever of unknown origin; hx of coccidioides    COMPARISON STUDY: CT 8/27/2019    TECHNIQUE: CT scan of the chest, abdomen and pelvis was performed on  multidetector CT scanner using volumetric acquisition technique and  images were reconstructed in multiple planes with variable thickness  and reviewed on dedicated workstations.     CONTRAST: 79  ml Isovue 370.   Without oral contrast. During injection  of contrast, approximately 60 ml of Isovue-370 extravasated into the  patient?s right upper extremity with minimal contrast in the  vasculature.     CT scan radiation dose is optimized to minimum requisite dose using  automated dose modulation techniques.    FINDINGS:    Chest:   Mediastinum: The visualized thyroid is unremarkable. The heart is  normal in size. No significant  pericardial effusion. Coronary artery  calcifications, mitral annular and aortic valve calcifications. The  ascending aorta and main pulmonary artery are normal in caliber. No   thoracic lymphadenopathy. The esophagus is unremarkable.      Lungs: Central tracheobronchial tree is patent. Trace pleural  effusions with overlying subsegmental atelectasis. Chronic partially  calcified opacities in the right lower lobe. Multifocal patchy  groundglass opacities most  pronounced in the upper lobes, right  greater than left. Diffuse mosaic attenuation of the lungs. Mild  smooth interlobular septal thickening in the lung bases.    Abdomen and Pelvis:  Liver: Postoperative changes of liver transplantation. No focal mass.    Biliary System: Gallbladder surgically absent.    Pancreas: Diffusely atrophic. No mass or pancreatic ductal dilation.    Adrenal glands: No mass or nodules    Spleen: Normal.    Kidneys: Bilateral cortical atrophy. Punctate left renal stone. No  suspicious mass or hydronephrosis.    Gastrointestinal tract: Normal caliber small bowel.  Colonic  diverticulosis.    Mesentery/peritoneum/retroperitoneum: No mass. No free fluid or air.    Lymph nodes: No significant lymphadenopathy.    Vasculature: Patent major abdominal vasculature.    Pelvis: Urinary bladder is normal.    Osseous structures: No aggressive or acute osseous lesion.  Multilevel  degenerative changes of the visualized spine.      Soft tissues: Left chest wall cardiac device      Impression    IMPRESSION: 1. Patchy groundglass opacities most pronounced in the  upper lobes, suspicious for infection/inflammation. Consider workup  for atypical infectious etiologies given imaging findings and patient  history of immunosuppression.  2. Trace pleural effusions and probable pulmonary edema at the lung  bases.  3. Chronic partially calcified pulmonary opacities in the right lower  lobe, unchanged from prior.  4. No acute intra-abdominal pathology.    I have personally reviewed the examination and initial interpretation  and I agree with the findings.    KAYLIE BLAS MD         SYSTEM ID:  [id number]     *Note: Due to a large number of results and/or encounters for the requested time period, some results have not been displayed. A complete set of results can be found in Results Review.       Discharge Medications   Current Discharge Medication List        CONTINUE these medications which have NOT CHANGED     Details   amLODIPine (NORVASC) 5 MG tablet Take 0.5 tablets (2.5 mg) by mouth daily.  Qty: 30 tablet, Refills: 0    Associated Diagnoses: Transient hypotension; Paroxysmal atrial fibrillation (H)      apixaban ANTICOAGULANT (ELIQUIS ANTICOAGULANT) 2.5 MG tablet Take 1 tablet (2.5 mg) by mouth 2 times daily.  Qty: 180 tablet, Refills: 1    Associated Diagnoses: Paroxysmal atrial fibrillation (H)      calcitRIOL (ROCALTROL) 0.25 MCG capsule Take 1 capsule (0.25 mcg) by mouth daily.  Qty: 90 capsule, Refills: 1    Associated Diagnoses: Papillary thyroid carcinoma (H)      Continuous Glucose Sensor (FREESTYLE NINO 2 SENSOR) MISC Change every 14 days.  Qty: 2 each, Refills: 5    Comments: Medicare B  Associated Diagnoses: Postoperative hypothyroidism; Papillary thyroid carcinoma (H); Type 2 diabetes mellitus with stage 3 chronic kidney disease, without long-term current use of insulin (H)      D-MANNOSE PO Take 1 Scoop by mouth 2 times daily.      EPINEPHrine (ANY BX GENERIC EQUIV) 0.3 MG/0.3ML injection 2-pack Inject 0.3 mLs (0.3 mg) into the muscle as needed for anaphylaxis. May repeat one time in 5-15 minutes if response to initial dose is inadequate.  Qty: 2 each, Refills: 1    Associated Diagnoses: Anaphylaxis, sequela      estradiol (ESTRACE) 0.1 MG/GM vaginal cream Place vaginally every other day.      evolocumab (REPATHA SURECLICK) 140 MG/ML prefilled autoinjector Inject 1 mL (140 mg) subcutaneously every 14 days. . Please have fasting labs drawn for further refills.  Qty: 6 mL, Refills: 3    Associated Diagnoses: Hyperlipidemia LDL goal <70; Statin intolerance; HDL deficiency; Type 2 diabetes mellitus with stage 3 chronic kidney disease, without long-term current use of insulin (H); Hypertriglyceridemia; H/O: CVA (cerebrovascular accident)      ezetimibe (ZETIA) 10 MG tablet Take 1 tablet (10 mg) by mouth daily.  Qty: 90 tablet, Refills: 3    Associated Diagnoses: Hyperlipidemia LDL goal <70; Benign essential  hypertension      Ferrous Sulfate 324 MG TBEC Take 1 tablet by mouth 2 times daily as needed.      folic acid (FOLVITE) 1 MG tablet Take 1 tablet (1 mg) by mouth daily.  Qty: 100 tablet, Refills: 0    Associated Diagnoses: Liver replaced by transplant (H)      gabapentin (NEURONTIN) 250 MG/5ML solution Take 6 mLs (300 mg) by mouth at bedtime  Qty: 180 mL, Refills: 0    Associated Diagnoses: Liver replaced by transplant (H)      glucose (BD GLUCOSE) 5 g chewable tablet Take 2 tablets (10 g) by mouth as needed (low blood sugar)  Qty: 40 tablet, Refills: 1    Associated Diagnoses: Type 2 diabetes mellitus with stage 3 chronic kidney disease, without long-term current use of insulin (H)      hypromellose (ARTIFICIAL TEARS) 0.4 % SOLN ophthalmic solution Apply 1 drop to eye every hour as needed for dry eyes    Associated Diagnoses: Liver replaced by transplant (H)      ketoconazole (NIZORAL) 2 % external cream Apply topically 2 times daily as needed (redness and flaking). Apply to the face.  Qty: 30 g, Refills: 3    Associated Diagnoses: Seborrheic dermatitis      ketoconazole (NIZORAL) 2 % external shampoo Apply topically three times a week. Lather in the shower, wait 3-5 minutes before rinsing.  Qty: 100 mL, Refills: 11    Associated Diagnoses: Seborrheic dermatitis      levothyroxine (SYNTHROID/LEVOTHROID) 175 MCG tablet Take 1 tablet (175 mcg) by mouth daily.  Qty: 90 tablet, Refills: 1    Associated Diagnoses: Postoperative hypothyroidism      linagliptin (TRADJENTA) 5 MG TABS tablet Take 1 tablet (5 mg) by mouth daily.  Qty: 90 tablet, Refills: 1    Associated Diagnoses: Postoperative hypothyroidism; Papillary thyroid carcinoma (H); Type 2 diabetes mellitus with stage 3 chronic kidney disease, without long-term current use of insulin (H)      meclizine (ANTIVERT) 25 MG tablet Take 1 tablet (25 mg) by mouth as needed for dizziness or other (migraines)  Qty: 30 tablet, Refills: 11    Associated Diagnoses: Dizziness       metoprolol succinate ER (TOPROL XL) 25 MG 24 hr tablet Take 1 tablet (25 mg) by mouth daily. Take an extra tablet daily as needed for breakthrough afib. May repeat in two hours if heart rate remains >100bpm (no more than 4 tablets per day).    Associated Diagnoses: Paroxysmal atrial fibrillation (H)      Multiple Vitamins-Minerals (PRESERVISION AREDS 2) CAPS Take 1 capsule by mouth 2 times daily    Associated Diagnoses: Liver replaced by transplant (H)      Nutrisource Fiber PO packet Take 1 packet by mouth daily.      omega-3 acid ethyl esters (LOVAZA) 1 g capsule Take 1 capsule by mouth 2 times daily  Qty: 180 capsule, Refills: 1    Associated Diagnoses: Hypertriglyceridemia      predniSONE (DELTASONE) 10 MG tablet Take 1 tablet (10 mg) by mouth daily.  Qty: 90 tablet, Refills: 3    Comments: TXP DT 5/22/2002 (Liver) TXP Dischg DT 6/3/2002 DX Liver replaced by transplant Z94.4 [facility] ([place], [place])  Associated Diagnoses: Liver replaced by transplant (H)      sirolimus (GENERIC EQUIVALENT) 1 MG tablet Take 1 tablet (1 mg) by mouth daily.  Qty: 90 tablet, Refills: 3    Comments: TXP DT 5/22/2002 (Liver) TXP Dischg DT 6/3/2002 DX Liver replaced by transplant Z94.4 TX [facility] ([place], [place])  Associated Diagnoses: Liver replaced by transplant (H)      ursodiol (ACTIGALL) 250 MG tablet Take 1 tablet (250 mg) by mouth 2 times daily.  Qty: 180 tablet, Refills: 3    Associated Diagnoses: Liver replaced by transplant (H)           Allergies   Allergies   Allergen Reactions    Fluconazole Hives and Itching     Full body hives  **Intradermal skin testing negative**  [See intradermal skin testing results from 8/2/2019]    Mycophenolate Diarrhea and Nausea and Vomiting     Patient stated it was chronic and lasted months      Penicillins Anaphylaxis, Hives, Itching and Rash     **Intradermal skin testing  negative**  [See intradermal skin testing results from 8/2/2019]      Simvastatin Muscle Pain (Myalgia)     severe  Other reaction(s): Myalgia caused by statin    Methotrexate Other (See Comments)     Other reaction(s): Sore  Sores in mouth, esophagus, and stomach.       Morphine And Codeine Itching and Other (See Comments)     Psych disturbance  Other reaction(s): Confusion, Mood alteration    Quinolones Anxiety, Dizziness, Headache, Other (See Comments), Palpitations and Unknown     Other reaction(s): Hyperactive behavior, Lightheadedness, Mood alteration    Dizzy, light headed    Dizziness, shaky, and jumpy    Benadryl [Diphenhydramine Hcl]      Insomnia     Capsules, Empty Gelatin [Gelatin]     Lansoprazole Diarrhea    Azithromycin Itching     [See intradermal skin testing results from 8/2/2019]    Bactrim [Sulfamethoxazole-Trimethoprim] Other (See Comments)     Numb mouth, tingling lips (treated with anti-histamines)    Cephalosporins Itching     [See intradermal skin testing results from 8/2/2019]    Ciprofloxacin Hcl Other (See Comments) and Dizziness     Insomnia, mood lability, Irregular heart beat         Doxycycline Itching and Unknown     [See intradermal skin testing results from 8/2/2019]    Lisinopril Cough    Omeprazole Itching    Tolectin [Nsaids] Rash    Tolmetin Rash and Itching    Tramadol Rash, Hives and Itching

## 2025-01-24 ENCOUNTER — TRANSFERRED RECORDS (OUTPATIENT)
Dept: HEALTH INFORMATION MANAGEMENT | Facility: CLINIC | Age: 75
End: 2025-01-24

## 2025-01-24 ENCOUNTER — MEDICAL CORRESPONDENCE (OUTPATIENT)
Dept: HEALTH INFORMATION MANAGEMENT | Facility: CLINIC | Age: 75
End: 2025-01-24

## 2025-01-24 LAB
BACTERIA BLD CULT: NO GROWTH
SCANNED LAB RESULT: NORMAL

## 2025-01-24 NOTE — PLAN OF CARE
Occupational Therapy Discharge Summary    Reason for therapy discharge:    Discharged to home with home therapy.    Progress towards therapy goal(s). See goals on Care Plan in [facility] electronic health record for goal details.  Goals partially met.  Barriers to achieving goals:   discharge from facility.    Therapy recommendation(s):    Continued therapy is recommended.  Rationale/Recommendations:  HHOT for increased safety and ind with ADL/IADL.

## 2025-01-24 NOTE — TELEPHONE ENCOUNTER
[facility] Call Center    Phone Message    May a detailed message be left on voicemail: yes     Reason for Call: Other: pt is saying there, is a special biopsy team who takes care of nervous pts, she said the one she had previously in the transplant skin check room didn't turn out, she is also convinced there's another spot on her cheek she believes is skin cancer (was previously told it was just a pimple & it would resolve itself) pt declined to schedule another skin check appt.     Action Taken: Message routed to:  [facility] Derm    Travel Screening: Not Applicable     Date of Service:

## 2025-01-24 NOTE — TELEPHONE ENCOUNTER
Writer spoke to patient, she reports that she will meet with Infectious Disease in 2 weeks, she is awaiting blood culture/blood test results to determine if she has [place] Fever.   Writer informed patient to continue on current dose of eliquis at 2.5 mg twice daily, should an antifungal be prescribed, we would not want to increase the eliquis dose. Patient will keep writer informed.    Writer conferred with [facility] Ella, IF antifungal is NOT prescribed, OK to increase eliquis dose to 5 mg twice daily.     Note from Dr. Zuluaga:  1/16/25  4:34 PM  Yes 5 mg twice daily is to correct dose for paroxysmal A-fib.    Taniya Cavanaugh RN  Anticoagulation Clinic

## 2025-01-25 ENCOUNTER — HEALTH MAINTENANCE LETTER (OUTPATIENT)
Age: 75
End: 2025-01-25

## 2025-01-25 LAB
BACTERIA BLD CULT: NO GROWTH
BACTERIA BLD CULT: NO GROWTH

## 2025-01-27 ENCOUNTER — PATIENT OUTREACH (OUTPATIENT)
Dept: CARE COORDINATION | Facility: CLINIC | Age: 75
End: 2025-01-27
Payer: MEDICARE

## 2025-01-27 ENCOUNTER — TELEPHONE (OUTPATIENT)
Dept: INFECTIOUS DISEASES | Facility: CLINIC | Age: 75
End: 2025-01-27
Payer: MEDICARE

## 2025-01-27 ENCOUNTER — TELEPHONE (OUTPATIENT)
Dept: INTERNAL MEDICINE | Facility: CLINIC | Age: 75
End: 2025-01-27
Payer: MEDICARE

## 2025-01-27 ENCOUNTER — DOCUMENTATION ONLY (OUTPATIENT)
Dept: OTHER | Facility: CLINIC | Age: 75
End: 2025-01-27
Payer: MEDICARE

## 2025-01-27 ASSESSMENT — ACTIVITIES OF DAILY LIVING (ADL): DEPENDENT_IADLS:: CLEANING;TRANSPORTATION;SHOPPING

## 2025-01-27 NOTE — TELEPHONE ENCOUNTER
MTM referral from: Transitions of Care (recent hospital discharge, TCU discharge, or ED visit)    MTM referral outreach attempt #1 on January 27, 2025 at 2:16 PM      Outcome: pt returned call and is not interested in MTM visit    Use vbc for the carrier/Plan on the flowsheet      Jemima Muller CMA  MTM

## 2025-01-27 NOTE — LETTER
[place],     Here is the information we discussed over the phone. Take a picture of your Advanced Care Planning documents and then email them to [email or web address]. If you have any additional questions, I've attached their contact information below.     They can scan or take pictures of the documents (if they are unable to scan), and send them to the [employer] ([email or web address]) email, we will then be able to upload them to the chart. If they have any questions, you can refer them to our department.    [facility] [employer] provides support to [facility], [facility], and [facility] for ensuring system Advance Care Planning (ACP) and Surrogate Decision-Maker processes comply with Federal and State regulatory requirements and align with system goals and focus areas.      HOURS: Monday-Friday 6a to 5p (with exception of 8 department holidays as indicated on dept email/voicemail)  AFTER HOURS: For emergency validation of documents contact your assigned  per site protocol      CONTACT:    EMAIL: [email or web address]   PHONE: [phone]  EPIC: [employer] Tracy    Office: [address] [address] (We are mostly remote with varying on-site hours)    Alpa Cho, RN, BSN, CPHN, CCM  [facility] Ambulatory Care Management  [facility], and [facility]  [email or web address]  Office: [phone]  Employed by [facility] [employer] [facility]       Electronically signed

## 2025-01-27 NOTE — PROGRESS NOTES
Clinic Care Coordination Contact  Clinic Care Coordination Contact  OUTREACH    Referral Information: RN SUPA received return call from patient. She would like to enroll at this time.   Referral Source: IP Handoff     Chief Complaint   Patient presents with    Clinic Care Coordination - Initial      Universal Utilization: Risk of admission or ED visit 98.8%  Clinic Utilization  Difficulty keeping appointments:: No  Compliance Concerns: No  No-Show Concerns: No  No PCP office visit in Past Year: No  Utilization      No Show Count (past year)  1             ED Visits  8             Hospital Admissions  6                    Current as of: 1/27/2025 11:11 AM              Clinical Concerns:  Current Medical Concerns:  Needs to complete sleep study for known sleep apnea. Difficulty getting her standing walker into vehicle.     Current Behavioral Concerns: None.     Education Provided to patient: Educated patient on Care Coordination: outreach schedule; resources available; shared responsibility of goals and goals to achieve.    Pain  Pain (GOAL):: No  Health Maintenance Reviewed: Due/Overdue (Discussed with patient.)  Clinical Pathway: None    Medication Management:  Medication review status: Medications reviewed on 1/19/25. RN CC did not re-review during enrollment assessment.   Current Outpatient Medications   Medication Sig Dispense Refill    amLODIPine (NORVASC) 5 MG tablet Take 0.5 tablets (2.5 mg) by mouth daily. 30 tablet 0    apixaban ANTICOAGULANT (ELIQUIS ANTICOAGULANT) 2.5 MG tablet Take 1 tablet (2.5 mg) by mouth 2 times daily. 180 tablet 1    calcitRIOL (ROCALTROL) 0.25 MCG capsule Take 1 capsule (0.25 mcg) by mouth daily. 90 capsule 1    Continuous Glucose Sensor (FREESTYLE NINO 2 SENSOR) MISC Change every 14 days. 2 each 5    D-MANNOSE PO Take 1 Scoop by mouth 2 times daily.      EPINEPHrine (ANY BX GENERIC EQUIV) 0.3 MG/0.3ML injection 2-pack Inject 0.3 mLs (0.3 mg) into the muscle as needed for  anaphylaxis. May repeat one time in 5-15 minutes if response to initial dose is inadequate. 2 each 1    estradiol (ESTRACE) 0.1 MG/GM vaginal cream Place vaginally every other day.      evolocumab (REPATHA SURECLICK) 140 MG/ML prefilled autoinjector Inject 1 mL (140 mg) subcutaneously every 14 days. . Please have fasting labs drawn for further refills. 6 mL 3    ezetimibe (ZETIA) 10 MG tablet Take 1 tablet (10 mg) by mouth daily. 90 tablet 3    Ferrous Sulfate 324 MG TBEC Take 1 tablet by mouth 2 times daily as needed.      folic acid (FOLVITE) 1 MG tablet Take 1 tablet (1 mg) by mouth daily. 100 tablet 0    gabapentin (NEURONTIN) 250 MG/5ML solution Take 6 mLs (300 mg) by mouth at bedtime 180 mL 0    glucose (BD GLUCOSE) 5 g chewable tablet Take 2 tablets (10 g) by mouth as needed (low blood sugar) 40 tablet 1    hypromellose (ARTIFICIAL TEARS) 0.4 % SOLN ophthalmic solution Apply 1 drop to eye every hour as needed for dry eyes      ketoconazole (NIZORAL) 2 % external cream Apply topically 2 times daily as needed (redness and flaking). Apply to the face. 30 g 3    ketoconazole (NIZORAL) 2 % external shampoo Apply topically three times a week. Lather in the shower, wait 3-5 minutes before rinsing. 100 mL 11    levothyroxine (SYNTHROID/LEVOTHROID) 175 MCG tablet Take 1 tablet (175 mcg) by mouth daily. 90 tablet 1    linagliptin (TRADJENTA) 5 MG TABS tablet Take 1 tablet (5 mg) by mouth daily. 90 tablet 1    meclizine (ANTIVERT) 25 MG tablet Take 1 tablet (25 mg) by mouth as needed for dizziness or other (migraines) 30 tablet 11    metoprolol succinate ER (TOPROL XL) 25 MG 24 hr tablet Take 1 tablet (25 mg) by mouth daily. Take an extra tablet daily as needed for breakthrough afib. May repeat in two hours if heart rate remains >100bpm (no more than 4 tablets per day).      Multiple Vitamins-Minerals (PRESERVISION AREDS 2) CAPS Take 1 capsule by mouth 2 times daily      Nutrisource Fiber PO packet Take 1 packet by  mouth daily.      omega-3 acid ethyl esters (LOVAZA) 1 g capsule Take 1 capsule by mouth 2 times daily 180 capsule 1    predniSONE (DELTASONE) 10 MG tablet Take 1 tablet (10 mg) by mouth daily. 90 tablet 3    sirolimus (GENERIC EQUIVALENT) 1 MG tablet Take 1 tablet (1 mg) by mouth daily. 90 tablet 3    ursodiol (ACTIGALL) 250 MG tablet Take 1 tablet (250 mg) by mouth 2 times daily. 180 tablet 3     No current facility-administered medications for this visit.      Allergies   Allergen Reactions    Fluconazole Hives and Itching     Full body hives  **Intradermal skin testing negative**  [See intradermal skin testing results from 8/2/2019]    Mycophenolate Diarrhea and Nausea and Vomiting     Patient stated it was chronic and lasted months      Penicillins Anaphylaxis, Hives, Itching and Rash     **Intradermal skin testing negative**  [See intradermal skin testing results from 8/2/2019]      Simvastatin Muscle Pain (Myalgia)     severe  Other reaction(s): Myalgia caused by statin    Methotrexate Other (See Comments)     Other reaction(s): Sore  Sores in mouth, esophagus, and stomach.       Morphine And Codeine Itching and Other (See Comments)     Psych disturbance  Other reaction(s): Confusion, Mood alteration    Quinolones Anxiety, Dizziness, Headache, Other (See Comments), Palpitations and Unknown     Other reaction(s): Hyperactive behavior, Lightheadedness, Mood alteration    Dizzy, light headed    Dizziness, shaky, and jumpy    Benadryl [Diphenhydramine Hcl]      Insomnia     Capsules, Empty Gelatin [Gelatin]     Lansoprazole Diarrhea    Azithromycin Itching     [See intradermal skin testing results from 8/2/2019]    Bactrim [Sulfamethoxazole-Trimethoprim] Other (See Comments)     Numb mouth, tingling lips (treated with anti-histamines)    Cephalosporins Itching     [See intradermal skin testing results from 8/2/2019]    Ciprofloxacin Hcl Other (See Comments) and Dizziness     Insomnia, mood lability, Irregular  heart beat         Doxycycline Itching and Unknown     [See intradermal skin testing results from 8/2/2019]    Lisinopril Cough    Omeprazole Itching    Tolectin [Nsaids] Rash    Tolmetin Rash and Itching    Tramadol Rash, Hives and Itching     Functional Status:  Dependent ADLs:: Ambulation-walker, Ambulation-cane (Does not shower when alone.)  Dependent IADLs:: Cleaning, Transportation, Shopping  Bed or wheelchair confined:: No  Mobility Status: Independent w/Device  Fallen 2 or more times in the past year?: No  Any fall with injury in the past year?: No    Living Situation:  Current living arrangement:: I live alone  Type of residence:: Apartment - handicap accessible    Lifestyle & Psychosocial Needs:    Social Drivers of Health     Food Insecurity: Low Risk  (1/27/2025)    Food Insecurity     Within the past 12 months, did you worry that your food would run out before you got money to buy more?: No     Within the past 12 months, did the food you bought just not last and you didn t have money to get more?: No   Depression: Not at risk (1/2/2025)    PHQ-2     PHQ-2 Score: 2   Housing Stability: Low Risk  (1/27/2025)    Housing Stability     Do you have housing? : Yes     Are you worried about losing your housing?: No   Recent Concern: Housing Stability - High Risk (1/22/2025)    Housing Stability     Do you have housing? : No     Are you worried about losing your housing?: No   Tobacco Use: Medium Risk (1/2/2025)    Patient History     Smoking Tobacco Use: Former     Smokeless Tobacco Use: Never     Passive Exposure: Not on file   Financial Resource Strain: Low Risk  (1/27/2025)    Financial Resource Strain     Within the past 12 months, have you or your family members you live with been unable to get utilities (heat, electricity) when it was really needed?: No   Alcohol Use: Not At Risk (11/9/2023)    Received from [facility]    AUDIT-C     Frequency of Alcohol Consumption: Never     Average Number of Drinks:  Patient does not drink     Frequency of Binge Drinking: Never   Transportation Needs: High Risk (1/27/2025)    Transportation Needs     Within the past 12 months, has lack of transportation kept you from medical appointments, getting your medicines, non-medical meetings or appointments, work, or from getting things that you need?: Yes   Physical Activity: Insufficiently Active (11/9/2023)    Received from [facility]    Exercise Vital Sign     Days of Exercise per Week: 5 days     Minutes of Exercise per Session: 10 min   Interpersonal Safety: Low Risk  (1/22/2025)    Interpersonal Safety     Do you feel physically and emotionally safe where you currently live?: Yes     Within the past 12 months, have you been hit, slapped, kicked or otherwise physically hurt by someone?: No     Within the past 12 months, have you been humiliated or emotionally abused in other ways by your partner or ex-partner?: No   Stress: No Stress Concern Present (11/9/2023)    Received from [facility]    [employer] [place] [employer] - Occupational Stress Questionnaire     Feeling of Stress : Only a little   Social Connections: Feeling Socially Integrated (12/20/2023)    Received from [facility]    OASIS : Social Isolation     Frequency of experiencing loneliness or isolation: Never   Health Literacy: Not on file     Diet:: Diabetic diet  Inadequate nutrition (GOAL):: No  Tube Feeding: No  Inadequate activity/exercise (GOAL):: No  Significant changes in sleep pattern (GOAL): No  Transportation means:: Accessible car     Anabaptism or spiritual beliefs that impact treatment:: No  Mental health DX:: No  Mental health management concern (GOAL):: No  Chemical Dependency Status: No Current Concerns  Chemical Dependency Management:  (N/A)  Informal Support system:: Children, Family      Resources and Interventions:  Current Resources:   Skilled Home Care Services: Physical Therapy, Occupational Therapy Kristina Peter  J[employer]))  Community Resources: Home Care, Housekeeping/Chore Agency (Granddaughter cleans patient's home)  Supplies Currently Used at Home: Other, Nutritional Supplements, Diabetic Supplies (Protein shakes; Neil II for DM2)  Equipment Currently Used at Home: grab bar, toilet, grab bar, tub/shower, cane, straight, walker, standard (Stand up walker with a seat)  Employment Status: retired     Advance Care Plan/Directive  Advanced Care Plans/Directives on file:: No  Discussed with patient/caregiver:: Other (Comment) (Patient states ACD filed previously.)    Referrals Placed: [facility]    Care Plan:  Care Plan: Advance Care Plan       Problem: Patient does not have a valid Health Care Directive       Goal: Complete Health Care Directive       Start Date: 1/27/2025 Expected End Date: 7/28/2025    Note:     Barriers: Limited Mobility; Limited transportation resources; Provider availability - wait time to complete appointments.   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   Patient expressed understanding of goal: Yes.   Action steps to achieve this goal:  1. I will respond to information found from Care Coordinator on my Advanced Care Planning documents.  2. I will take any necessary steps to ensure documents are filed with [facility].   3. I will contact my care team with questions, concerns or support needs. I will use the clinic as a resource and I understand I can contact my clinic with 24/7 after hours services available. Care Coordinator will remain available as needed.                               Care Plan: Health Maintenance       Problem: Health Maintenance Due or Overdue       Goal: Become up-to-date with health maintenance visit(s)       Start Date: 1/27/2025 Expected End Date: 7/28/2025    This Visit's Progress: 10%    Note:     Barriers: Limited Mobility; Limited transportation resources; Provider availability - wait time to  complete appointments.   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   Patient expressed understanding of goal: Yes.   Action steps to achieve this goal:  1. I will take my medications as prescribed.  2. I will discuss, review, schedule and complete recommended overdue health maintenance with my Primary Care Provider.   3. I will contact my care team with questions, concerns or support needs. I will use the clinic as a resource and I understand I can contact my clinic with 24/7 after hours services available. Care Coordinator will remain available as needed.                            Care Plan: Resources       Problem: Resources       Goal: Resources for Podiatry, Sleep study and Transportation services.       Start Date: 1/27/2025 Expected End Date: 7/28/2025    Note:     Barriers: Limited Mobility; Limited transportation resources; Provider availability - wait time to complete appointments.   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   Patient expressed understanding of goal: Yes.   Action steps to achieve this goal:  1. I will review the resources for Transportation services and Podiatrists.   2. I will contact the resources with any questions. I will contact my Care Coordinator for any additional resources as needed.   3. I will follow up on the referral for a sleep study by contacting them (with the number provided by Care Coordinator) to schedule my sleep study.   4. I will contact my care team with questions, concerns or support needs. I will use the clinic as a resource and I understand I can contact my clinic with 24/7 after hours services available. Care Coordinator will remain available as needed.                            Patient/Caregiver understanding: Patient/caregiver verbalized understanding and denies any additional questions or concerns at this time. RNCC engaged in  [employer] communications during encounter.     Outreach Frequency: monthly, more frequently as needed    Future Appointments                In 3 days Inge Bland APRN CNP [place], [place]    In 2 weeks Natividad Lundberg MD [facility] Infectious Disease Clinic [facility]    In 1 month Rachel Masterson MBBS [facility] Endocrinology [facility]    In 1 month RODRIGES DCR2 [facility] Heart [facility], [facility] PSA CLIN    In 1 month Ciera Gutierrez MD [facility] Urology Clinic [place], UA PHY [place]    In 2 months Maria Ines Otero MD [facility] Specialty Clinic [place], CS    In 2 months Gentry Ramirez MD [facility] Hepatology [facility]    In 4 months Meri Frost MD [facility] Eye [facility], [facility] MSA CLIN    In 8 months Gentry Ramirez MD [facility] Hepatology [facility]    In 10 months Edel Blair PA-C [facility] Dermatology [facility]          Plan: RN SUPA sent an email to [facility] [employer] about patient's ACP documents. Patient is sure her son gave them to a nurse when patient was admitted to the ICU at [facility] in September 22/24 to 9/25/24. RN CC will send patient introduction letter with attached resources for Podiatrists in the [facility] system. Included with introduction letter are printed list for Transportation services and [employer] application. Also included is the Patient-centered Plan of Care. Patient prefers receiving by [employer] Postal [employer].     Addendum: Sindhu from [employer] responded to email sent by RN SUPA. They have no record of receiving any documents for patient. Sindhu instructed patient to take a picture of her documents and email them to [email or web address]. RN CC sent this information to patient via her [employer] account after calling her to update on their  response. Patient verbalized understanding.     Alpa Cho RN, BSN, CPHN, [facility] Ambulatory Care Management  [facility], and [facility]  [email or web address]  Office: [phone]  Employed by [employer]       Clinic Care Coordination Contact  [facility]/Voicemail    Clinical Data: Care Coordinator Outreach    Outreach Documentation Number of Outreach Attempt   1/27/2025  10:06 AM 1     Left message on patient's voicemail with call back information and requested return call.    Plan: Care Coordinator will try to reach patient again in 1-2 business days.    Alpa Cho RN, BSN, CPHN, [facility] Care Management  [facility], and [facility]  [email or web address]  Office: [phone]  Employed by [facility]

## 2025-01-27 NOTE — LETTER
[facility] CARE COORDINATION  [address]  [facility] [address]    January 27, 2025    Luz Thompson  [address]   [facility] [address]      Dear [place],    I am a clinic care coordinator who works with Daniel Hidalgo MD with the [facility]. I wanted to introduce myself and provide you with my contact information for you to be able to call me with any questions or concerns. I wanted to thank you for spending the time to talk with me.  Below is a description of clinic care coordination and how I can further assist you.       The clinic care coordination team is made up of a registered nurse, , financial resource worker and community health worker who understand the health care system. The goal of clinic care coordination is to help you manage your health and improve access to the health care system. Our team works alongside your provider to assist you in determining your health and social needs. We can help you obtain health care and community resources, providing you with necessary information and education. We can work with you through any barriers and develop a care plan that helps coordinate and strengthen the communication between you and your care team.  Our services are voluntary and are offered without charge to you personally.    Please feel free to contact me with any questions or concerns regarding care coordination and what we can offer.      We are focused on providing you with the highest-quality healthcare experience possible.    Sincerely,     Alpa Cho RN, BSN, CPHN, [facility] Ambulatory Care Management  [facility], and [facility]  [email or web address]  Office: [phone]  Employed by [facility]     Enclosed: I have enclosed a copy of the Patient Centered Plan of Care. This has helpful information and goals that we have talked about. Please keep this in an easy to access place to use as  needed. Also included is a list of [facility] Podiatrists who come to the [facility]; List of Transportation resources (printed separately) and an application for [employer] (printed separately).       Podiatry Providers    Haja Pathak [facility]      [phone]  [facility] Podiatry  [address] [facility], [address] [address]    Oneil Morales [facility]       [phone]  [facility] Podiatry  [address] [facility], [address] [address]

## 2025-01-27 NOTE — TELEPHONE ENCOUNTER
Assessment & Plan:      # Seborrheic dermatitis  Discussed that this is a recurring condition for most people and will need some maintenance even after rash has resolved. Plan as follows:  - Scalp: Start ketoconazole 2% shampoo three times weekly. Advised to lather in the shower, waiting 5-10 minutes before rinsing.   - Face and ears: Start ketoconazole 2% cream twice daily.     # Neoplasm of uncertain behavior:  right submandibular neck   DDx includes BCC vs ISK. Shave biopsy today.  - We discussed that the lesion is concerning for basal cell skin cancer. Risks and benefits of biopsy discussed, the patient verbally consented. The site was cleaned with alcohol and injected in lidocaine with epinephrine. The patient did not tolerate the pain well and requested discontinuation of the shave biopsy procedure. She requested to follow up for this at a later date. I prescribed lidocaine 4% cream to be applied to the site for 1 hour prior to shave biopsy procedure. We discussed that intralesional anesthesia will still be required prior to biopsy. Follow up order placed.      #Actinic keratosis x8  - We discussed the precancerous nature of the skin lesions.  I recommended liquid nitrogen cryotherapy and the patient was agreeable.    # Intertrigo  - I prescribed ketoconazole 2% cream to be applied twice daily for up to two weeks for flares.  We discussed the importance of keeping areas dry.  I recommended over the counter anti-fungal powders daily for maintenance, such as Zeasorb excess moisture.      # Chronic immunosuppression  # Multiple nevi, trunk and extremities  # Solar lentigines  - No concerning features on dermoscopy. We discussed the importance of self exams at home.   - ABCDEs: Counseled ABCDEs of melanoma: Asymmetry, Border (irregularity), Color (not uniform, changes in color), Diameter (greater than 6 mm which is about the size of a pencil eraser), and Evolving (any changes in preexisting moles).  - Sun  protection: Counseled SPF 30+ sunscreen, UPF clothing, sun avoidance, tanning bed avoidance.     # Cherry angiomas  # Seborrheic keratoses  - We discussed the benign nature of the skin lesions. No treatment required. Continued observation recommended. Follow up with any concerns.       Follow-up:  Annual for follow up full body skin exam, as needed for new or changing lesions or new concerns

## 2025-01-27 NOTE — TELEPHONE ENCOUNTER
[facility] Call [facility]    Phone Message    May a detailed message be left on voicemail: yes     Reason for Call: Other: Per pt currently schedule for VV on 02/12/2025 with Dr. Lundberg. Next available with provider Dr. Lundberg 02/28/2025. Pt did not want to wait that long. Would like to see some one sooner. Please advise on who and follow up with pt.  Per pt was seen recently in hospital and was suppose to follow up in two weeks or so from discharged. Pt was discharged last week.         Action Taken: Other: ID    Travel Screening: Not Applicable     Date of Service:

## 2025-01-27 NOTE — LETTER
[facility]  Patient Centered Plan of Care  About Me:        Patient Name:  Luz Thompson    YOB: 1950  Age:         74 year old   Diamond MRN:    [id number] Telephone Information:  Home Phone [phone]   Mobile [phone]       Address:  [address]  [facility] [address] Email address:  [email or web address]      Emergency Contact(s)    Name Relationship Lgl Grd Work Phone Home Phone Mobile Phone   1. GERMAN NERI Daughter    [phone]   2. SU THOMPSON* Son    [phone]           Primary language:  English     needed? No   [employer]:  [phone] op. 1  Other communication barriers:Physical impairment    Preferred Method of Communication:  Haja  Current living arrangement: I live alone    Mobility Status/ Medical Equipment: Independent w/Device    Health Maintenance  Health Maintenance Reviewed: Due/Overdue (Discussed with patient.)    My Access Plan  Medical Emergency 911   Primary Clinic Line [facility] - [phone]   24 Hour Appointment Line [phone] or  [phone]) (toll-free)   24 Hour Nurse Line [phone] (toll-free)   Preferred Urgent Care No data recorded   Preferred [facility]  [phone]     Preferred Pharmacy [facility] Not Mail Employee Only Now [facility] - [place], [place] - [address] Behavioral Health Crisis Line The National Suicide Prevention Lifeline at 1-378.199.5996 or Text/Call 578     My Care Team Members  Patient Care Team         Relationship Specialty Notifications Start End    Daniel Hidalgo MD PCP - General Internal Medicine  1/2/25     Phone: [phone] Fax: [phone]         [address] [facility] [address]    Gentry Ramirez MD MD Gastroenterology  11/26/13     Phone: [phone] Fax: [phone]         [address] [facility] [address]    Oz  Eden Bright MD MD Pulmonary Disease  4/9/15     Phone: [phone] Fax: [phone]         420 [facility] 276 [facility] [address]    Graham Gilmore MD MD Clinical Cardiac Electrophysiology  5/8/17     Randell Reyna MD MD Cardiology  6/6/17     Phone: [phone] Pager: [phone] Fax: [phone]        9 [facility] [address]    Mavis Bermudez MD MD Dermatology  2/2/18     Phone: [phone] Fax: [phone] [address] [facility] 110 [facility] [address]    Sandeep Waddell MD MD Urology  3/28/18     Phone: [phone] Fax: [phone]         [address] [facility] 100 [facility] [address]    Viki Fletcher, RN Registered Nurse Urology  3/29/18     Phone: [phone] Pager: [phone]        Trisha Timmons RD Diabetes Educator Diabetes Education  8/22/18     Phone: [phone] Fax: [phone]         [address] [place] [address]    Dora Tovar MD MD Ophthalmology  4/22/19     Phone: [phone] Fax: [phone]         [address] [place] [address]    Meri Frost MD MD Ophthalmology  4/22/19     Phone: [phone] Fax: [phone]         [address] [address]    Rach Vasquez MD MD Internal Medicine  5/15/19     Phone: [phone] Fax: [phone]         [address] [facility] [address]    Eben Reyes, DO Assigned PCP   7/1/23     Phone: [phone] Fax: [phone]         [address] [place] [address]    Gentry Ramirez MD MD Gastroenterology  6/24/24     Phone: [phone] Fax: [phone] 909 [facility] [address]    Rachel Masterson MBBS Fellow Endocrinology, Diabetes, and Metabolism  7/2/24     Phone: [phone] Fax: [phone] 909 [facility] [address]    Cherie Mujica PA-C Physician Assistant Urology  7/10/24     Phone: [phone] Fax: [phone] [address] [place] [address]  [facility] [address]    Kirill Zuluaga MD MD Cardiovascular Disease  7/17/24     Phone: [phone] Fax: [phone]         [address] [facility] [address]    Tereza Richards APRN CNP Nurse Practitioner Neurological Surgery  7/18/24     Phone: [phone] Fax: [phone]         [address] [address]    Curly Smart DO Assigned Musculoskeletal Provider   7/23/24     Phone: [phone] Fax: [phone] [address] [place] [address] [place] 300 [facility] [address]    Meri Frost MD MD Ophthalmology  7/29/24     Phone: [phone] Fax: [phone]         420 [facility] 493 [facility] [address]    Edwina Riddle, PA-C Assigned OBGYN Provider   8/23/24     Phone: [phone] Fax: [phone] [address] [place] 500 [facility] [address]    Maria Ines Otero MD MD Nephrology  9/10/24     Phone: [phone] Fax: [phone]         500 [facility] [address]    Rachel Masterson MBBS Assigned Endocrinology Provider   9/23/24     Phone: [phone] Fax: [phone]         420 [facility] 101 [facility] [address]    Kirill Zuluaga MD Assigned Heart and Vascular Provider   9/23/24     Phone: [phone] Fax: [phone] [address] [facility] [address]    Tereza Richards APRN CNP Nurse Practitioner Neurological Surgery  10/23/24     Phone: [phone] Fax: [phone]         [address] [place] [address] [address]    Natividad Lundberg MD MD Infectious Diseases  11/8/24     Phone: [phone] Fax: [phone]         [address] [address]    Natividad Lundberg MD Assigned Infectious Disease Provider   12/23/24     Phone: [phone] Fax: [phone] [address] [address]    Daniel Hidalgo MD Assigned PCP   1/23/25     Phone: [phone] Fax: [phone]         [address] [facility] [address]    Meri Frost MD Assigned  Surgical Provider   1/23/25     Phone: [phone] Fax: [phone]         420 [facility] 493 [facility] [address]    Alpa Cho, RN Lead Care Coordinator Primary Care - CC Admissions 1/27/25     Phone: [phone]                   My Care Plans  Self Management and Treatment Plan    Care Plan  Care Plan: Advance Care Plan       Problem: Patient does not have a valid Health Care Directive       Goal: Complete Health Care Directive       Start Date: 1/27/2025 Expected End Date: 7/28/2025    Note:     Barriers: Limited Mobility; Limited transportation resources; Provider availability - wait time to complete appointments.   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   Patient expressed understanding of goal: Yes.   Action steps to achieve this goal:  1. I will respond to information found from Care Coordinator on my Advanced Care Planning documents.  2. I will take any necessary steps to ensure documents are filed with [employer].   3. I will contact my care team with questions, concerns or support needs. I will use the clinic as a resource and I understand I can contact my clinic with 24/7 after hours services available. Care Coordinator will remain available as needed.                               Care Plan: Health Maintenance       Problem: Health Maintenance Due or Overdue       Goal: Become up-to-date with health maintenance visit(s)       Start Date: 1/27/2025 Expected End Date: 7/28/2025    This Visit's Progress: 10%    Note:     Barriers: Limited Mobility; Limited transportation resources; Provider availability - wait time to complete appointments.   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   Patient expressed understanding of goal: Yes.   Action steps to achieve this goal:  1. I will take my medications as prescribed.  2. I will discuss, review, schedule and  complete recommended overdue health maintenance with my Primary Care Provider.   3. I will contact my care team with questions, concerns or support needs. I will use the clinic as a resource and I understand I can contact my clinic with 24/7 after hours services available. Care Coordinator will remain available as needed.                            Care Plan: Resources       Problem: Resources       Goal: Resources for Podiatry, Sleep study and Transportation services.       Start Date: 1/27/2025 Expected End Date: 7/28/2025    Note:     Barriers: Limited Mobility; Limited transportation resources; Provider availability - wait time to complete appointments.   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   Patient expressed understanding of goal: Yes.   Action steps to achieve this goal:  1. I will review the resources for Transportation services and Podiatrists.   2. I will contact the resources with any questions. I will contact my Care Coordinator for any additional resources as needed.   3. I will follow up on the referral for a sleep study by contacting them (with the number provided by Care Coordinator) to schedule my sleep study.   4. I will contact my care team with questions, concerns or support needs. I will use the clinic as a resource and I understand I can contact my clinic with 24/7 after hours services available. Care Coordinator will remain available as needed.                            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    Prophylactic antibiotic    High risk medication use    Statin intolerance    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    Sensorineural hearing loss (SNHL) of both ears    Abnormal liver function tests    Liver replaced by transplant (H)    Hyperlipidemia LDL goal <70    Hypertension goal BP (blood pressure) < 140/80    Postoperative hypothyroidism    Type 2 diabetes mellitus with stage 3b chronic kidney disease, without long-term current use of insulin (H)    Age-related osteoporosis without current pathological fracture    Tympanic membrane perforation, left    Other infective chronic otitis externa of left ear    Stage 3b chronic kidney disease (H)    Escherichia coli sepsis (H)    Physical deconditioning    Immunosuppression    Papillary thyroid carcinoma (H)    PAF (paroxysmal atrial fibrillation) (H)    Status post liver transplantation (H)    Elevated lactic acid level    Fever in adult    Urinary tract infection    Chronic kidney disease, unspecified CKD stage    Lung infection    Acute cystitis without hematuria    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    Pyelonephritis, acute    Generalized abdominal pain    Left flank pain    Immunocompromised    H/O liver transplant (H)    Sepsis without acute organ dysfunction, due to unspecified organism (H)    Immunosuppressed status    Transient hypotension    Pneumonia due to infectious organism, unspecified laterality, unspecified part of lung    Pneumonia    Shortness of breath    Fever    DERREK (acute kidney injury)      Current Medications:  Please refer to the most recent medication list provided to you by your medical team and reach out to your provider with any questions or to make any corrections.  Current Outpatient Medications   Medication Sig Dispense Refill    amLODIPine (NORVASC) 5 MG tablet Take 0.5 tablets (2.5 mg) by mouth daily. 30 tablet 0    apixaban ANTICOAGULANT (ELIQUIS ANTICOAGULANT) 2.5 MG tablet Take 1  tablet (2.5 mg) by mouth 2 times daily. 180 tablet 1    calcitRIOL (ROCALTROL) 0.25 MCG capsule Take 1 capsule (0.25 mcg) by mouth daily. 90 capsule 1    Continuous Glucose Sensor (FREESTYLE NINO 2 SENSOR) MISC Change every 14 days. 2 each 5    D-MANNOSE PO Take 1 Scoop by mouth 2 times daily.      EPINEPHrine (ANY BX GENERIC EQUIV) 0.3 MG/0.3ML injection 2-pack Inject 0.3 mLs (0.3 mg) into the muscle as needed for anaphylaxis. May repeat one time in 5-15 minutes if response to initial dose is inadequate. 2 each 1    estradiol (ESTRACE) 0.1 MG/GM vaginal cream Place vaginally every other day.      evolocumab (REPATHA SURECLICK) 140 MG/ML prefilled autoinjector Inject 1 mL (140 mg) subcutaneously every 14 days. . Please have fasting labs drawn for further refills. 6 mL 3    ezetimibe (ZETIA) 10 MG tablet Take 1 tablet (10 mg) by mouth daily. 90 tablet 3    Ferrous Sulfate 324 MG TBEC Take 1 tablet by mouth 2 times daily as needed.      folic acid (FOLVITE) 1 MG tablet Take 1 tablet (1 mg) by mouth daily. 100 tablet 0    gabapentin (NEURONTIN) 250 MG/5ML solution Take 6 mLs (300 mg) by mouth at bedtime 180 mL 0    glucose (BD GLUCOSE) 5 g chewable tablet Take 2 tablets (10 g) by mouth as needed (low blood sugar) 40 tablet 1    hypromellose (ARTIFICIAL TEARS) 0.4 % SOLN ophthalmic solution Apply 1 drop to eye every hour as needed for dry eyes      ketoconazole (NIZORAL) 2 % external cream Apply topically 2 times daily as needed (redness and flaking). Apply to the face. 30 g 3    ketoconazole (NIZORAL) 2 % external shampoo Apply topically three times a week. Lather in the shower, wait 3-5 minutes before rinsing. 100 mL 11    levothyroxine (SYNTHROID/LEVOTHROID) 175 MCG tablet Take 1 tablet (175 mcg) by mouth daily. 90 tablet 1    linagliptin (TRADJENTA) 5 MG TABS tablet Take 1 tablet (5 mg) by mouth daily. 90 tablet 1    meclizine (ANTIVERT) 25 MG tablet Take 1 tablet (25 mg) by mouth as needed for dizziness or other  (migraines) 30 tablet 11    metoprolol succinate ER (TOPROL XL) 25 MG 24 hr tablet Take 1 tablet (25 mg) by mouth daily. Take an extra tablet daily as needed for breakthrough afib. May repeat in two hours if heart rate remains >100bpm (no more than 4 tablets per day).      Multiple Vitamins-Minerals (PRESERVISION AREDS 2) CAPS Take 1 capsule by mouth 2 times daily      Nutrisource Fiber PO packet Take 1 packet by mouth daily.      omega-3 acid ethyl esters (LOVAZA) 1 g capsule Take 1 capsule by mouth 2 times daily 180 capsule 1    predniSONE (DELTASONE) 10 MG tablet Take 1 tablet (10 mg) by mouth daily. 90 tablet 3    sirolimus (GENERIC EQUIVALENT) 1 MG tablet Take 1 tablet (1 mg) by mouth daily. 90 tablet 3    ursodiol (ACTIGALL) 250 MG tablet Take 1 tablet (250 mg) by mouth 2 times daily. 180 tablet 3     No current facility-administered medications for this visit.      Allergies   Allergen Reactions    Fluconazole Hives and Itching     Full body hives  **Intradermal skin testing negative**  [See intradermal skin testing results from 8/2/2019]    Mycophenolate Diarrhea and Nausea and Vomiting     Patient stated it was chronic and lasted months      Penicillins Anaphylaxis, Hives, Itching and Rash     **Intradermal skin testing negative**  [See intradermal skin testing results from 8/2/2019]      Simvastatin Muscle Pain (Myalgia)     severe  Other reaction(s): Myalgia caused by statin    Methotrexate Other (See Comments)     Other reaction(s): Sore  Sores in mouth, esophagus, and stomach.       Morphine And Codeine Itching and Other (See Comments)     Psych disturbance  Other reaction(s): Confusion, Mood alteration    Quinolones Anxiety, Dizziness, Headache, Other (See Comments), Palpitations and Unknown     Other reaction(s): Hyperactive behavior, Lightheadedness, Mood alteration    Dizzy, light headed    Dizziness, shaky, and jumpy    Benadryl [Diphenhydramine Hcl]      Insomnia     Capsules, Empty Gelatin  [Gelatin]     Lansoprazole Diarrhea    Azithromycin Itching     [See intradermal skin testing results from 8/2/2019]    Bactrim [Sulfamethoxazole-Trimethoprim] Other (See Comments)     Numb mouth, tingling lips (treated with anti-histamines)    Cephalosporins Itching     [See intradermal skin testing results from 8/2/2019]    Ciprofloxacin Hcl Other (See Comments) and Dizziness     Insomnia, mood lability, Irregular heart beat         Doxycycline Itching and Unknown     [See intradermal skin testing results from 8/2/2019]    Lisinopril Cough    Omeprazole Itching    Tolectin [Nsaids] Rash    Tolmetin Rash and Itching    Tramadol Rash, Hives and Itching     Care Coordination Start Date: 1/27/2025   Frequency of Care Coordination: monthly, more frequently as needed     Form Last Updated: 01/27/2025

## 2025-01-30 LAB — BACTERIA BLD CULT: NORMAL

## 2025-01-31 NOTE — TELEPHONE ENCOUNTER
"Request for biopsy on neck from last visit and check spot on left cheek    She is asking if there is a \"special team\" to assist with the biopsy. I asked what her concerns were, and she stated that she the pain was intolerable and that she \"won't put myself through that again\". I asked if she had a needle phobia, she said no.     I asked the patient how she would like to proceed. She states she wants a \"provider who just does biopsies all day long\". I stated to patient that all our derm providers do this. She stated: \"you need to provide me with better care\".    She is very concerned about the areas and still wants them assessed. I stated that I would have her doctor advise on next steps.    Leonie Arevalo, PHAM          "

## 2025-01-31 NOTE — TELEPHONE ENCOUNTER
Edel Blair PA-C  You; Leonie Arevalo, BACILION19 hours ago (12:29 PM)       Could she be added on to a transplant clinic? Perhaps at 10:45? Thanks!

## 2025-02-03 ENCOUNTER — TELEPHONE (OUTPATIENT)
Dept: DERMATOLOGY | Facility: CLINIC | Age: 75
End: 2025-02-03
Payer: MEDICARE

## 2025-02-03 DIAGNOSIS — D49.2 NEOPLASM OF UNSPECIFIED BEHAVIOR OF BONE, SOFT TISSUE, AND SKIN: Primary | ICD-10-CM

## 2025-02-03 RX ORDER — LIDOCAINE 40 MG/G
CREAM TOPICAL ONCE
Qty: 5 G | Refills: 0 | Status: SHIPPED | OUTPATIENT
Start: 2025-02-03 | End: 2025-02-03

## 2025-02-03 NOTE — TELEPHONE ENCOUNTER
[facility] Call Center    Phone Message    May a detailed message be left on voicemail: yes     Reason for Call: Other: Pt states she is returning a call to schedule urgently. No referral or orders in Epic, no documentation of a call. Please call Pt back to discuss at [phone]. Thank you.      Action Taken: Message routed to:  [facility] Derm    Travel Screening: Not Applicable     Date of Service:

## 2025-02-03 NOTE — TELEPHONE ENCOUNTER
Edel Blair, Leonie Mehta, PHAM; Kevin Rivera, CMA4 hours ago (8:16 AM)     Thanks. I have prescribed lidocaine cream she may apply to the site one hour prior to the procedure. We discussed this clinic this may help limit some of the pain, but will not remove it completely. She is welcome to follow up with any provider.

## 2025-02-03 NOTE — TELEPHONE ENCOUNTER
Spoke with pt. Pt is going to get a second opinion with another dermatologist. Declined scheduling at this time. Will call back once she gets second opinion.  Verona Walden RN

## 2025-02-04 ENCOUNTER — DOCUMENTATION ONLY (OUTPATIENT)
Dept: OTHER | Facility: CLINIC | Age: 75
End: 2025-02-04
Payer: MEDICARE

## 2025-02-06 LAB — BACTERIA BLD CULT: NORMAL

## 2025-02-13 ENCOUNTER — TELEPHONE (OUTPATIENT)
Dept: INTERNAL MEDICINE | Facility: CLINIC | Age: 75
End: 2025-02-13
Payer: MEDICARE

## 2025-02-13 ENCOUNTER — TELEPHONE (OUTPATIENT)
Dept: CARDIOLOGY | Facility: CLINIC | Age: 75
End: 2025-02-13
Payer: MEDICARE

## 2025-02-13 LAB — BACTERIA BLD CULT: NORMAL

## 2025-02-13 NOTE — TELEPHONE ENCOUNTER
Writer spoke to patient, see DOAC encounter dated 1/16/25.    Taniya Cavanaugh RN  Anticoagulation Clinic

## 2025-02-13 NOTE — TELEPHONE ENCOUNTER
General Call      Reason for Call:  Would like a call back from nurse Taniya Cavanaugh INR     What are your questions or concerns:  Infectious Disease did not per her back on medication, so pt thinks she should increase eliquis . And she will be informing her cardiologist.       Could we send this information to you in [facility] [place] or would you prefer to receive a phone call?:   Patient would prefer a phone call   Okay to leave a detailed message?: Yes at Cell number on file:    Telephone Information:   Mobile [phone]

## 2025-02-13 NOTE — TELEPHONE ENCOUNTER
Message from patient. She states she checked with her Infectious Disease team and they won't be using voriconazole at this time. She is wondering if she should make the change recommended in January for her eliquis dose.    Per ACC team note 1/21/2025:  Apixaban dose was decreased from 5 mg twice daily to 2.5 mg twice daily in 2017 due to drug-to-drug interaction while taking voriconazole, which is no longer listed on current medication list.     Per Dr. Zuluaga's dictation 8/23/2024:  Interrogation of her Linq shows most recent tachycardia was on June 23 and appeared to be SVT. Her longest episode of atrial fibrillation appears to be 2 hours and 14 minutes occurring on April 10, 2024.     Patient is due back in August 2025.    Will message Dr. Zuluaga to review

## 2025-02-13 NOTE — PROGRESS NOTES
8/31/2019:    Social Work Services Discharge Note      Patient Name:  Luz Thompson     Anticipated Discharge Date:  8/31/2019     Discharge Disposition:   TCU  [facility]  [address] [place] [address]  Mingo Hemphill  PH: [phone]  F: [phone]  Direct for intake: [phone]    Following MD:    Discharging MD: Aliya Pace  [facility] MD     Pre-Admission Screening (PAS) online form has been completed.  The Level of Care (LOC) is:  Determined  Confirmation Code is:  [id number]  Patient/caregiver informed of referral to [facility] Line for Pre-Admission Screening for skilled nursing facility (SNF) placement and to expect a phone call post discharge from SNF.     Additional Services/Equipment Arranged:    Family providing transportation to facility. Daughter, Gloria Gordo: [phone]  Per daughter, patient is all set up to order an Uber for transportation to TCU.     Met with patient, went over Uber jennifer on her phone. She is all set up to order the ride as soon as she is packed up and has her paperwork. [facility] to wheelchair her to front door.      Patient / Family response to discharge plan:  Patient and family aware, agreeable. Patient agreeable, ready to go.     Persons notified of above discharge plan:  [facility], Med team, Bedside RN, Daughter    Staff Discharge Instructions:  Please fax discharge orders and signed hard scripts for any controlled substances.  Please print a packet and send with patient.     faxed Discharge orders:   F: [phone]       CTS Handoff completed:  YES    Medicare Notice of Rights provided to the patient/family:  HUMA Smith, LGSW  ICU 4A & 4C   Ph: [phone]  Pager: [phone]     Patient instructed by Dr Rogers to stop Eliquis 3 days prior to procedure - last 2/15/25.

## 2025-02-13 NOTE — TELEPHONE ENCOUNTER
Patient called to report that ID provider did not prescribe an antifungal so OK to proceed with increased dose of apixaban. New prescription for 5 mg twice daily faxed.    ANTICOAGULATION  STEWARDSHIP    Spoke with [place] to review advised dosage change for Apixaban (Eliquis).    Education provided: how to take apixaban (Eilquis) safely: take doses as close to every 12 hours as possible; at the same time each day and OK to finish up current supply of 2.5 mg tablets by taking 2 tablets in the morning and 2 tablets in the evening, patient verbalized understanding.    They verbalized understanding of new Apixaban (Eliquis) dose/tablet strength  They were notified Rx for new dosage would be sent to preferred pharmacy    Taniya Cavanaugh RN  [facility]

## 2025-02-14 ENCOUNTER — ANCILLARY PROCEDURE (OUTPATIENT)
Dept: GENERAL RADIOLOGY | Facility: CLINIC | Age: 75
End: 2025-02-14
Attending: FAMILY MEDICINE
Payer: MEDICARE

## 2025-02-14 DIAGNOSIS — M25.511 CHRONIC PAIN OF BOTH SHOULDERS: ICD-10-CM

## 2025-02-14 DIAGNOSIS — M25.512 CHRONIC PAIN OF BOTH SHOULDERS: ICD-10-CM

## 2025-02-14 DIAGNOSIS — G89.29 CHRONIC PAIN OF BOTH SHOULDERS: ICD-10-CM

## 2025-02-14 PROCEDURE — 73030 X-RAY EXAM OF SHOULDER: CPT | Mod: TC | Performed by: RADIOLOGY

## 2025-02-14 NOTE — TELEPHONE ENCOUNTER
Reply from Dr. Zuluaga:  Yes Please increase back to 5 bid     1040 attempted to contact patient with update from Dr. Zuluaga. He sent a new script out yesterday for eliquis 5mg BID to [facility].  Requested a call back from the patient to review the plan and verify the correct pharmacy.    Left message for patient to call back to Team 2 R.N.s @ [phone]

## 2025-02-16 DIAGNOSIS — Z94.4 LIVER REPLACED BY TRANSPLANT (H): ICD-10-CM

## 2025-02-17 ENCOUNTER — TELEPHONE (OUTPATIENT)
Dept: DERMATOLOGY | Facility: CLINIC | Age: 75
End: 2025-02-17
Payer: MEDICARE

## 2025-02-17 ENCOUNTER — PATIENT OUTREACH (OUTPATIENT)
Dept: CARE COORDINATION | Facility: CLINIC | Age: 75
End: 2025-02-17
Payer: MEDICARE

## 2025-02-17 ENCOUNTER — TELEPHONE (OUTPATIENT)
Dept: INFECTIOUS DISEASES | Facility: CLINIC | Age: 75
End: 2025-02-17
Payer: MEDICARE

## 2025-02-17 RX ORDER — FOLIC ACID 1 MG/1
1000 TABLET ORAL DAILY
Qty: 90 TABLET | Refills: 3 | Status: SHIPPED | OUTPATIENT
Start: 2025-02-17

## 2025-02-17 NOTE — TELEPHONE ENCOUNTER
"LVM asking the patient to call the clinic back to schedule an appointment    Going to offer patient 02/18 at 10:30am or 11:15am with Dr. Pacheco.    There is no other \"control pain\" options other than applying the topical medication an hour before the biopsy. Patient is welcome to bring in things to help her, such as headphones. We can provide the patient with a stress ball.    Kevin HUANG CMA    "

## 2025-02-17 NOTE — TELEPHONE ENCOUNTER
[facility] Call Center    Phone Message    May a detailed message be left on voicemail: no     Reason for Call: Appointment Intake    Referring Provider Name: Natividad Lundberg MD in  INFECTIOUS DISEASE  Diagnosis and/or Symptoms: Immunosuppressed status [D84.9] H/O liver transplant (H) [Z94.4] Skin lesion of cheek [L98.9]   Additional Information: Patient does not want to see TIFFANIE Hollingsworth. She would like better pain control during biopsy, she has lidocaine gel which she will apply one hour prior to appt.  Priority: 1-2 Weeks    Routing to clinic for review and to assist with scheduling due to urgency, if it should be with a specific provider and the mention of pain control.    Action Taken: Message routed to:  [facility]    Travel Screening: Not Applicable     Date of Service:

## 2025-02-17 NOTE — TELEPHONE ENCOUNTER
EP called 2/17 to sched a 1 month follow up with Dr. Lundberg via in-person in March per checkout notes from 2/12.

## 2025-02-17 NOTE — PROGRESS NOTES
"Clinic Care Coordination Contact    Situation: Patient chart reviewed by care coordinator.    Background: Patient is enrolled in Care Coordination and followed by RN CC who has been monitoring patient during enrollment for goal progression.     Assessment: RN SUPA received VM from patient requesting return call. RN CC called patient back. She stated that she will get her Hepatitis A/B once her infection is cleared. She also stated that [employer] resource was a farce. She was totally unimpressed by them since they use Uber or Lyft. They charge $17/a month for the privilege of calling Uber or Lyft for the patient. She felt it was a \"rip off\", since there was no price break.     Plan/Recommendations: No further needs at this time. RN CC will continue to follow patient for any additional needs.     Alpa Cho RN, BSN, CPHN, [facility] Management  [facility], and [facility]  [email or web address]  Office: [phone]  Employed by [facility]     "

## 2025-02-18 NOTE — TELEPHONE ENCOUNTER
Spoke with patient to clarify that she is using eliquis 5mg BID. Patient verified that [facility] is her new pharmacy..    She will send a refill request later today to change her heart meds over to [facility] also.

## 2025-02-20 ENCOUNTER — OFFICE VISIT (OUTPATIENT)
Dept: DERMATOLOGY | Facility: CLINIC | Age: 75
End: 2025-02-20
Payer: MEDICARE

## 2025-02-20 DIAGNOSIS — D49.2 NEOPLASM OF UNSPECIFIED BEHAVIOR OF BONE, SOFT TISSUE, AND SKIN: Primary | ICD-10-CM

## 2025-02-20 LAB — BACTERIA BLD CULT: NORMAL

## 2025-02-20 PROCEDURE — 88305 TISSUE EXAM BY PATHOLOGIST: CPT | Mod: 26 | Performed by: DERMATOLOGY

## 2025-02-20 PROCEDURE — 88305 TISSUE EXAM BY PATHOLOGIST: CPT | Mod: TC | Performed by: DERMATOLOGY

## 2025-02-20 NOTE — PROGRESS NOTES
[facility] Dermatology Note  Encounter Date: Feb 20, 2025  Office Visit     Dermatology Problem List:  *May require sedation for procedures*  # NUB, right submandibular neck, s/p shave bx 2/20/25  # NUB, left cheek, s/p shave bx 2/20/25    *lesion to biopsy left dorsal hand see 2/20/25 note*  # s/p liver transplant for PBC (2002)   - current: sirolimus, prednisone   # History NMSC, per patient report, tx in [place]  - SCC, R eyebrow, 2024  - SCC, R forehead, 2023  - SCC, L hand, 2021  - SCC, L upper arm, unknown date  # Pruritic papular skin eruption-self-induced, resolved  # Mild atopic dermatitis- hands, thumbs, abdomen, back   - previous: Amlactin, patient discontinued   # Actinic keratoses- right temple, left cheek, s/p LN2  # Onychomycosis-  Oral voriconazole, prescribed by Dr. Montero (IM)   # Drug hypersensitivity reaction- fluconazole  - residual lesions of abdomen and chest   # Atrophe shanna  # Drug allergies   -s/p intradermal testing to several ABX 8/2019, noncontributory  -plan: oral provocation testing     ____________________________________________    Assessment & Plan:   # NUB, left cheek.  Ddx KA vs other SCC vs BCC   -  s/p shave biopsy today (see procedure note below)     # NUB, right submandibular neck  Ddx BCC vs other NMSC vs iSK    - s/p shave biopsy today (see procedure note below)    # NUB, left dorsal hand  Patient notes that the lesion has previously been treated with cryotherapy at least three times. Discussed that we will plan on having the lesion biopsied once the above results return. Preferably, if patient needs Mohs surgery for either of the above lesions, will plan on performing biopsy at that time.       Procedures Performed:   - Shave biopsy procedure note, location(s): right submandibular neck, left cheek. After discussion of benefits and risks including but not limited to bleeding, infection, scar, incomplete removal, recurrence, and non-diagnostic biopsy, verbal  consent and photographs were obtained. The area was cleaned with isopropyl alcohol. 0.5mL of 1% lidocaine with epinephrine was injected to obtain adequate anesthesia of lesion(s). Shave biopsy at site(s) performed. Hemostasis was achieved with Surgifoam. Petrolatum ointment and a sterile dressing were applied. The patient tolerated the procedure   and no complications were noted. The patient was provided with verbal and written post care instructions.       Follow-up: pending path results, otherwise as scheduled for annual skin check 12/9/25    Staff: Dr. Kristina Nunez MD,  Dermatology Resident, PGY2      ____________________________________________    CC: Derm Problem (Neoplasm of uncertain behavior:  right submandibular neck)    HPI:  Ms. Luz Thompson is a(n) 74 year old female who presents today as a return patient for biopsy.    Patient was last seen for a skin check with Edel MORENO on 12/9/24. At that time patient had a neoplasm of uncertain behavior on the right submandibular neck concerning for basal cell skin cancer. Shave biopsy was recommended, however patient could not tolerate the pain of the lidocaine numbing and requested the biopsy be performed at a later date. The patient was prescribed topical lidocaine cream to use to the site an hour prior to the biopsy.    Today patient is here for both a biopsy of the right submandibular neck as well as a new spot on the left cheek. She notes that the spot on the left cheek will intermittently drain and is irritating.    Patient is otherwise feeling well, without additional skin concerns.    Labs Reviewed:  N/A    Physical Exam:  Vitals: LMP 06/01/1988 (Approximate)   SKIN: Focused examination of face/neck and left hand was performed.  - skin colored to pink exophytic papule with keratin core on the left cheek  - pink firm papule on the right submandibular neck  - firm thickened pink papule with scale and central erosion on the  left dorsal hand  - No other lesions of concern on areas examined.     Medications:  Current Outpatient Medications   Medication Sig Dispense Refill    amLODIPine (NORVASC) 5 MG tablet Take 0.5 tablets (2.5 mg) by mouth daily. 30 tablet 0    apixaban ANTICOAGULANT (ELIQUIS) 5 MG tablet Take 1 tablet (5 mg) by mouth 2 times daily. 180 tablet 1    calcitRIOL (ROCALTROL) 0.25 MCG capsule Take 1 capsule (0.25 mcg) by mouth daily. 90 capsule 1    Continuous Glucose Sensor (FREESTYLE NINO 2 SENSOR) MISC Change every 14 days. 2 each 5    D-MANNOSE PO Take 1 Scoop by mouth 2 times daily.      EPINEPHrine (ANY BX GENERIC EQUIV) 0.3 MG/0.3ML injection 2-pack Inject 0.3 mLs (0.3 mg) into the muscle as needed for anaphylaxis. May repeat one time in 5-15 minutes if response to initial dose is inadequate. 2 each 1    estradiol (ESTRACE) 0.1 MG/GM vaginal cream Place vaginally every other day.      evolocumab (REPATHA SURECLICK) 140 MG/ML prefilled autoinjector Inject 1 mL (140 mg) subcutaneously every 14 days. . Please have fasting labs drawn for further refills. 6 mL 3    ezetimibe (ZETIA) 10 MG tablet Take 1 tablet (10 mg) by mouth daily. 90 tablet 3    Ferrous Sulfate 324 MG TBEC Take 1 tablet by mouth 2 times daily as needed.      folic acid (FOLVITE) 1 MG tablet TAKE 1 TABLET BY MOUTH DAILY 90 tablet 3    gabapentin (NEURONTIN) 250 MG/5ML solution Take 6 mLs (300 mg) by mouth at bedtime 180 mL 0    glucose (BD GLUCOSE) 5 g chewable tablet Take 2 tablets (10 g) by mouth as needed (low blood sugar) 40 tablet 1    hypromellose (ARTIFICIAL TEARS) 0.4 % SOLN ophthalmic solution Apply 1 drop to eye every hour as needed for dry eyes      ketoconazole (NIZORAL) 2 % external cream Apply topically 2 times daily as needed (redness and flaking). Apply to the face. 30 g 3    ketoconazole (NIZORAL) 2 % external shampoo Apply topically three times a week. Lather in the shower, wait 3-5 minutes before rinsing. 100 mL 11    levothyroxine  (SYNTHROID/LEVOTHROID) 175 MCG tablet Take 1 tablet (175 mcg) by mouth daily. 90 tablet 1    linagliptin (TRADJENTA) 5 MG TABS tablet Take 1 tablet (5 mg) by mouth daily. 90 tablet 1    meclizine (ANTIVERT) 25 MG tablet Take 1 tablet (25 mg) by mouth as needed for dizziness or other (migraines) 30 tablet 11    metoprolol succinate ER (TOPROL XL) 25 MG 24 hr tablet Take 1 tablet (25 mg) by mouth daily. Take an extra tablet daily as needed for breakthrough afib. May repeat in two hours if heart rate remains >100bpm (no more than 4 tablets per day).      Multiple Vitamins-Minerals (PRESERVISION AREDS 2) CAPS Take 1 capsule by mouth 2 times daily      Nutrisource Fiber PO packet Take 1 packet by mouth daily.      omega-3 acid ethyl esters (LOVAZA) 1 g capsule Take 1 capsule by mouth 2 times daily 180 capsule 1    predniSONE (DELTASONE) 10 MG tablet Take 1 tablet (10 mg) by mouth daily. 90 tablet 3    sirolimus (GENERIC EQUIVALENT) 1 MG tablet Take 1 tablet (1 mg) by mouth daily. 90 tablet 3    ursodiol (ACTIGALL) 250 MG tablet Take 1 tablet (250 mg) by mouth 2 times daily. 180 tablet 3     No current facility-administered medications for this visit.      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    Prophylactic antibiotic    High risk medication use    Statin intolerance    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    Sensorineural hearing loss (SNHL) of both ears    Abnormal liver function tests    Liver replaced by transplant (H)    Hyperlipidemia LDL goal <70    Hypertension goal BP (blood pressure) < 140/80    Postoperative hypothyroidism    Type 2 diabetes mellitus with stage 3b chronic kidney disease, without long-term current use of insulin (H)    Age-related osteoporosis without current  "pathological fracture    Tympanic membrane perforation, left    Other infective chronic otitis externa of left ear    Stage 3b chronic kidney disease (H)    Escherichia coli sepsis (H)    Physical deconditioning    Immunosuppression    Papillary thyroid carcinoma (H)    PAF (paroxysmal atrial fibrillation) (H)    Status post liver transplantation (H)    Elevated lactic acid level    Fever in adult    Urinary tract infection    Chronic kidney disease, unspecified CKD stage    Lung infection    Acute cystitis without hematuria    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    Pyelonephritis, acute    Generalized abdominal pain    Left flank pain    Immunocompromised    H/O liver transplant (H)    Sepsis without acute organ dysfunction, due to unspecified organism (H)    Immunosuppressed status    Transient hypotension    Pneumonia due to infectious organism, unspecified laterality, unspecified part of lung    Pneumonia    Shortness of breath    Fever    DERREK (acute kidney injury)     Past Medical History:   Diagnosis Date    Anemia of chronic disease 10/17/2011    Anxiety     CKD (chronic kidney disease) stage 3, GFR 30-59 ml/min (H) 04/04/2012    Coccidioidomycosis, 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 Gentry Ramirez, [facility]      Lung infection 11/24/2023    Macular degeneration     Migraines 04/04/2012    Mumps, history of     Nonsenile cataract     Osteoarthritis " of right knee 08/02/2012    Osteoporosis 04/20/2012    Paroxysmal atrial fibrillation 06/13/2017    Postablative hypothyroidism 08/13/2012    Primary biliary cirrhosis (H)     s/p Liver transplant, [id number]    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

## 2025-02-20 NOTE — NURSING NOTE
Dermatology Rooming Note    Luz Thompson's goals for this visit include:   Chief Complaint   Patient presents with    Derm Problem     Neoplasm of uncertain behavior:  right submandibular neck and left cheek  Topical lidocaine has been applied: since 7:15 am per patient   Leonie Arevalo LPN

## 2025-02-20 NOTE — NURSING NOTE
Lidocaine HCL injection 1% without epinephrine  3.5 mL once for one use, starting 2/20/2025 ending 2/20/2025,  4 mL disp, R-0, injection  Injected by Dr. Nunez    Dose: 3.5 mL  Route administered: IL  NDC #: 84114-714-56  Amount of waste(mL):0.5mL  Reason for waste: Single use vial    LOT #: [id number]  SITE: See provider's notes  : [facility]  EXPIRATION DATE: 05/30/2027

## 2025-02-24 ENCOUNTER — TELEPHONE (OUTPATIENT)
Dept: CARDIOLOGY | Facility: CLINIC | Age: 75
End: 2025-02-24

## 2025-02-24 ENCOUNTER — TELEPHONE (OUTPATIENT)
Dept: DERMATOLOGY | Facility: CLINIC | Age: 75
End: 2025-02-24

## 2025-02-24 DIAGNOSIS — I10 BENIGN ESSENTIAL HYPERTENSION: Primary | ICD-10-CM

## 2025-02-24 DIAGNOSIS — E78.1 HYPERTRIGLYCERIDEMIA: ICD-10-CM

## 2025-02-24 DIAGNOSIS — E78.5 HYPERLIPIDEMIA LDL GOAL <70: ICD-10-CM

## 2025-02-24 NOTE — TELEPHONE ENCOUNTER
Called patient to schedule surgery with Dr. May    Date of Surgery: 04/08     Surgery type: mohs    Consult scheduled: Yes    Has patient had mohs with us before? No    Additional comments: pt asked about how long she should keep a bandage on following Mohs. Please call to advise.     Pt had used a lidocaine salve 1 hour prior to the numbing injection for her biopsy. Pt asked for another prescription to be sent to her pharmacy so she can do this before her Mohs appt as well.     Flakito santacruz.       Lias Blevins on 2/24/2025 at 10:02 AM

## 2025-02-24 NOTE — TELEPHONE ENCOUNTER
Patient reports her blood pressure is still elevated, even after increasing amlodipine from 2.5 mg daily to 5 mg daily on 2/21/25. Reports taking her amlodipine, then checking her BP at least an hour after. States reading today was 182/82, however she had not yet taken metoprolol which she she reports taking later. Patient asked if she ever checks her blood pressure after both medications are on board. Patient reports she checked it one day this weekend and it was still over 160 systolic.     Dr. Zuluaga updated.

## 2025-02-24 NOTE — TELEPHONE ENCOUNTER
"Spoke with pt. Pt had questions about if she could take her bandaids off of her biopsy sites. Advised pt that if they appear to not be \"open\" anymore and are not having blood/drainage then she does not need to cover with a bandaid and advised that she still apply vaseline 1-2 times per day. Advised pt that after her Mohs procedure she will have a bandage on for 48 hours and after that point she will likely have skin glue over the stiches and not need to bandage them anymore. Pt verbalized understanding. Pt will discuss lidocaine cream at consult on 4/1.     Flori LEVI RN  Dermatology Surgery    "

## 2025-02-25 NOTE — TELEPHONE ENCOUNTER
FUTURE VISIT INFORMATION      FUTURE VISIT INFORMATION:  Date: 4.8.25  Time: 8:30  Location: [facility]  REFERRAL INFORMATION:  Referring provider:  Kristina  Referring providers clinic:  Derm  Reason for visit/diagnosis  SCC on the left cheek, patient will likely need anxiolytics      RECORDS REQUESTED FROM:       Clinic name Comments Records Status Imaging Status   Derm 2.20.25  Path # [id number] Epic Epic

## 2025-02-25 NOTE — TELEPHONE ENCOUNTER
Reply from Dr. Zuluaga:  Last CR was 2. We need to recheck BMP. This may be a renal problem. Amlodipine will take 5 days to reach steady state. Change metoprolol to coreg 25 bid. She is a transplant patient. She need to check in with her transplant team as well.       1100 Called patient to discuss recommendations.  1 patient is willing to have a bmp check if we call her home care team and have an RN come to her home.  Order placed for bmp this week  Called [facility] @ [phone], [phone] to see if they draw a bmp  Sent to talk to Ros @ [phone] - patient is only seen for OT and PT, medicare will not pay for a lab drawn alone  Bmp cannot be drawn by them    Called patient back - she will get them done on Thursday, may also draw her standing orders too    2 patient will try the amlodipine for a day or 2 more. She really wants to go back to her hydrodiuril and doesn't understand why the hospital changed her to amlodipine.    3 patient declined to try coreg. She does not like to change medications.    4 patient will check in with her transplant coordinator. She sees Dr. Ramirez in April 5 patient wants Dr. Zuluaga to choose a new provider for the transition that can see her in [place]. She wants someone as special as Dr. Zuluaga    Will message update to Dr. Zuluaga

## 2025-02-25 NOTE — TELEPHONE ENCOUNTER
Reply from Dr. Zuluaga:  Kirill Zuluaga MD Anderson, Alyson BOO, RN  Caller: Unspecified (Yesterday,  4:16 PM)  They chose amlodipine because of her abnormal kidney test.  Amlodipine does not affect the kidneys.  The other medicines do.  If after amlodipine for 5 days she is still hypertensive, if she does not want to go on Coreg which is a better beta-blocker for high blood pressure than metoprolol.  Then she can start taking her hydralazine regularly 25 mg 3 times a day.  It also will not affect her kidneys.  ==============================    Awaiting call back from patient re: is BP better on amlodipine?    See update from Dr. Zuluaga      2nd questions to Dr. Zuluaga - whom to set up for transition      2570 reply from Dr. Zuluaga:    Martin Morgan.     1545 called patient with update. Orders updated to show Dr. Morgan as her next provider    Patient is going to contact her PCP to see if she can get some homecare RN time to help her with BP management.

## 2025-02-26 ENCOUNTER — TELEPHONE (OUTPATIENT)
Dept: INTERNAL MEDICINE | Facility: CLINIC | Age: 75
End: 2025-02-26

## 2025-02-26 ENCOUNTER — PATIENT OUTREACH (OUTPATIENT)
Dept: CARE COORDINATION | Facility: CLINIC | Age: 75
End: 2025-02-26

## 2025-02-26 NOTE — PROGRESS NOTES
Clinic Care Coordination Contact  Follow Up Progress Note      Assessment: RN CC contacted patient for monthly outreach. Doing ok. She's having trouble with her blood pressure. She's looking for a new blood pressure cuff. She checked [employer] (they are standard; she actually bought 2). The OT will be there today and help her check the sizing.     Care Gaps:    Health Maintenance Due   Topic Date Due    HEPATITIS A IMMUNIZATION (1 of 2 - Risk 2-dose series) 04/03/1968    HEPATITIS B IMMUNIZATION (2 of 3 - Risk 3-dose series) 07/29/2001    MICROALBUMIN  02/05/2020    DEXA  08/23/2022    DIABETIC FOOT EXAM  09/07/2022    DTAP/TDAP/TD IMMUNIZATION (4 - Td or Tdap) 02/26/2024    RSV VACCINE (1 - 1-dose 75+ series) Never done    MEDICARE ANNUAL WELLNESS VISIT  12/06/2024     She did discuss with her Primary Care Provider. They are deferred until some of her other health issues are addressed.     Care Plans  Care Plan: Advance Care Plan       Problem: Patient does not have a valid Health Care Directive       Goal: Complete Health Care Directive       Start Date: 1/27/2025 Expected End Date: 7/28/2025    Note:     Barriers: Limited Mobility; Limited transportation resources; Provider availability - wait time to complete appointments.   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   Patient expressed understanding of goal: Yes.   Action steps to achieve this goal:  1. I will respond to information found from Care Coordinator on my Advanced Care Planning documents.  2. I will take any necessary steps to ensure documents are filed with [employer].   3. I will contact my care team with questions, concerns or support needs. I will use the clinic as a resource and I understand I can contact my clinic with 24/7 after hours services available. Care Coordinator will remain available as needed.                               Care Plan: Health Maintenance       Problem:  Health Maintenance Due or Overdue       Goal: Become up-to-date with health maintenance visit(s)       Start Date: 1/27/2025 Expected End Date: 7/28/2025    This Visit's Progress: 10%    Note:     Barriers: Limited Mobility; Limited transportation resources; Provider availability - wait time to complete appointments.   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   Patient expressed understanding of goal: Yes.   Action steps to achieve this goal:  1. I will take my medications as prescribed.  2. I will discuss, review, schedule and complete recommended overdue health maintenance with my Primary Care Provider.   3. I will contact my care team with questions, concerns or support needs. I will use the clinic as a resource and I understand I can contact my clinic with 24/7 after hours services available. Care Coordinator will remain available as needed.                            Care Plan: Resources       Problem: Resources       Goal: Resources for Podiatry, Sleep study and Transportation services.       Start Date: 1/27/2025 Expected End Date: 7/28/2025    Note:     Barriers: Limited Mobility; Limited transportation resources; Provider availability - wait time to complete appointments.   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   Patient expressed understanding of goal: Yes.   Action steps to achieve this goal:  1. I will review the resources for Transportation services and Podiatrists.   2. I will contact the resources with any questions. I will contact my Care Coordinator for any additional resources as needed.   3. I will follow up on the referral for a sleep study by contacting them (with the number provided by Care Coordinator) to schedule my sleep study.   4. I will contact my care team with questions, concerns or support needs. I will use the clinic as a resource and I understand I  can contact my clinic with 24/7 after hours services available. Care Coordinator will remain available as needed.                            Intervention/Education provided during outreach: Discussed patients plan of care. CC RN asked open ended questions, provided support, resources, and encouragement as needed. Patient will reach out to care team sooner than planned with new questions or concerns.     Plan: RN CC will continue to follow patient for any additional needs. Verified patient has RN CC contact information for any issues.     Care Coordinator will follow up in 1 month.     Alpa Cho RN, BSN, CPHN, [facility]  [facility], and [facility]  [email or web address]  Office: [phone]  Employed by [facility]

## 2025-02-26 NOTE — TELEPHONE ENCOUNTER
2/20/25 PHYSICAL THERAPY / OCCUPATIONAL THERAPY order received via fax. Form in your mailbox to be signed.

## 2025-02-27 ENCOUNTER — PATIENT OUTREACH (OUTPATIENT)
Dept: INTERNAL MEDICINE | Facility: CLINIC | Age: 75
End: 2025-02-27
Payer: MEDICARE

## 2025-02-27 LAB — BACTERIA BLD CULT: NORMAL

## 2025-02-27 RX ORDER — HYDRALAZINE HYDROCHLORIDE 25 MG/1
25 TABLET, FILM COATED ORAL 3 TIMES DAILY
Qty: 90 TABLET | Refills: 1 | Status: SHIPPED | OUTPATIENT
Start: 2025-02-27 | End: 2025-02-27

## 2025-02-27 RX ORDER — EZETIMIBE 10 MG/1
10 TABLET ORAL DAILY
Qty: 90 TABLET | Refills: 3 | Status: SHIPPED | OUTPATIENT
Start: 2025-02-27

## 2025-02-27 RX ORDER — HYDROCHLOROTHIAZIDE 25 MG/1
25 TABLET ORAL 3 TIMES DAILY
COMMUNITY
Start: 2025-02-27 | End: 2025-02-27

## 2025-02-27 RX ORDER — OMEGA-3-ACID ETHYL ESTERS 1 G/1
1 CAPSULE, LIQUID FILLED ORAL 2 TIMES DAILY
Qty: 180 CAPSULE | Refills: 1 | Status: SHIPPED | OUTPATIENT
Start: 2025-02-27

## 2025-02-27 RX ORDER — HYDRALAZINE HYDROCHLORIDE 25 MG/1
25 TABLET, FILM COATED ORAL 3 TIMES DAILY
COMMUNITY
Start: 2025-02-27

## 2025-02-27 NOTE — TELEPHONE ENCOUNTER
Patient Quality Outreach    Patient is due for the following:   Diabetes -  A1C and eGFR  Physical Annual Wellness Visit    Action(s) Taken:   Schedule a Annual Wellness Visit    Type of outreach:    Sent [employer] message.    Questions for provider review:    None           Joann Guillen MA  Chart routed to closed.

## 2025-02-27 NOTE — TELEPHONE ENCOUNTER
"Patient left a voicemail saying her BP today was 180/83 after taking her amlodipine 5mg and metoprolol XL 25mg. She is very concerned and states that \"something major\" must be done soon to get her numbers down. She notes her orthopaedic doctor will not proceed with her cortisone shot for shoulder pain until her BP is under better control.     Called patient back, reviewed previous recommendation to start hydralazine 25mg TID. She was agreeable to plan and will call in 1wk with an update. She has pills on hand and does not need an Rx. Dr Zuluaga updated.   "

## 2025-03-03 ENCOUNTER — MEDICAL CORRESPONDENCE (OUTPATIENT)
Dept: HEALTH INFORMATION MANAGEMENT | Facility: CLINIC | Age: 75
End: 2025-03-03

## 2025-03-03 LAB — BACTERIA BLD CULT: NO GROWTH

## 2025-03-04 ENCOUNTER — MYC MEDICAL ADVICE (OUTPATIENT)
Dept: INTERNAL MEDICINE | Facility: CLINIC | Age: 75
End: 2025-03-04
Payer: MEDICARE

## 2025-03-04 DIAGNOSIS — H72.92 TYMPANIC MEMBRANE PERFORATION, LEFT: Primary | ICD-10-CM

## 2025-03-05 NOTE — MR AVS SNAPSHOT
After Visit Summary   8/27/2018    Luz Thompson    MRN: [id number]           Patient Information     Date Of Birth          1950        Visit Information        Provider Department      8/27/2018 1:15 PM Randell Mejia MD [facility]        Today's Diagnoses     Sensorineural hearing loss (SNHL) of both ears    -  1    S/P tympanoplasty        Perforation of tympanic membrane, left        Other infective chronic otitis externa of left ear           Follow-ups after your visit        Your next 10 appointments already scheduled     Sep 12, 2018 12:20 PM CDT   CT CHEST W/O CONTRAST with UCCT2   [facility] [place] [facility] ([facility])    [address]  1st Floor  [place] [address] [phone]           Please bring any scans or X-rays taken at other hospitals, if similar tests were done. Also bring a list of your medicines, including vitamins, minerals and over-the-counter drugs. It is safest to leave personal items at home.  Be sure to tell your doctor:   If you have any allergies.   If there s any chance you are pregnant.   If you are breastfeeding.  You do not need to do anything special to prepare for this exam.  Please wear loose clothing, such as a sweat suit or jogging clothes. Avoid snaps, zippers and other metal. We may ask you to undress and put on a hospital gown.            Sep 12, 2018 12:40 PM CDT   (Arrive by 12:25 PM)   Return Visit with LIZ Dukes   [facility] ([facility] [place])    [address]  [address]  [place] [id number] [phone]            May 15, 2019 10:30 AM CDT   (Arrive by 10:15 AM)   Return Visit with Crystal Montero MD   [facility] ([facility])    [address]  [address]  [place] [id number] [phone]            May 22, 2019  1:30 PM CDT  Diabetes Education with Trisha Timmons RD   [facility], [place],  Diabetes Education ([facility])    [facility] [address]  [address] [place] [address] [phone]              Who to contact     If you have questions or need follow up information about today's clinic visit or your schedule please contact [facility] directly at [phone].  Normal or non-critical lab and imaging results will be communicated to you by Arccos Golfhart, letter or phone within 4 business days after the clinic has received the results. If you do not hear from us within 7 days, please contact the clinic through MyAppConvertert or phone. If you have a critical or abnormal lab result, we will notify you by phone as soon as possible.  Submit refill requests through Kar[employer] Platform or call your pharmacy and they will forward the refill request to us. Please allow 3 business days for your refill to be completed.          Additional Information About Your Visit        Arccos GolfharPerceptive Pixel Information     [employer] Platform gives you secure access to your electronic health record. If you see a primary care provider, you can also send messages to your care team and make appointments. If you have questions, please call your primary care clinic.  If you do not have a primary care provider, please call [phone] and they will assist you.        Care EveryWhere ID     This is your Care EveryWhere ID. This could be used by other organizations to access your [place] medical records  [id number]        Your Vitals Were     Pulse Respirations Height Last Period Pulse Oximetry BMI (Body Mass Index)    65 13 1.702 m (5' 7\") 06/01/1988 (Approximate) 98% 27.41 kg/m2       Blood Pressure from Last 3 Encounters:   08/15/18 148/75   08/13/18 159/82   08/06/18 146/81    Weight from Last 3 Encounters:   08/27/18 79.4 kg (175 lb)   08/15/18 79.4 kg (175 lb)   08/13/18 78.5 kg (173 lb)              Today, you had the following     No " orders found for display         Today's Medication Changes          These changes are accurate as of 8/27/18  1:44 PM.  If you have any questions, ask your nurse or doctor.               These medicines have changed or have updated prescriptions.        Dose/Directions    * clotrimazole 1 % cream   Commonly known as:  LOTRIMIN   This may have changed:  Another medication with the same name was added. Make sure you understand how and when to take each.   Changed by:  Randell Mejia MD        Apply topically 2 times daily as needed Reported on 4/25/2017   Refills:  0       * clotrimazole 1 % solution   Commonly known as:  LOTRIMIN   This may have changed:  You were already taking a medication with the same name, and this prescription was added. Make sure you understand how and when to take each.   Used for:  Other infective chronic otitis externa of left ear, S/P tympanoplasty   Changed by:  Randell Mejia MD        Dose:  1 mL   Apply 1 mL topically 2 times daily for 14 days Correct instruction - 4 drops LEFT ear twice daily for 14 days   Quantity:  28 mL   Refills:  1       * Notice:  This list has 2 medication(s) that are the same as other medications prescribed for you. Read the directions carefully, and ask your doctor or other care provider to review them with you.         Where to get your medicines      These medications were sent to [id number] - [place] [facility] [place] - [address]  [address] [place] [facility] [address]    Hours:  test fax successful 4/5/04  Phone:  [phone]     clotrimazole 1 % solution                Primary Care Provider Office Phone # Fax #    Jennifer Barraza [phone]       [address]  [facility] [address]        Goals        Diet    Have 3 meals a day     Notes - Note created  8/22/2018  1:18 PM by Trisha Timmons RD    Goal Statement: Have three meals per day, use the plate method to include 30-60 grams of  carbohydrate at meals and 15-30 grams of carbohydrate at snacks. Include a lean source of protein, heart healthy fat and vegetables at meals.  Measure of Success: using above method majority of the time  Supportive Steps to Achieve: Plan ahead, meal plan, support from friend whom she lives with, read labels  Barriers: none  Strengths: motivated   Patient expressed understanding of goal: yes, verbalized             General    Being Active (pt-stated)     Notes - Note created  8/22/2018  1:21 PM by Trisha Timmons RD    Continue working with a  twice per week and walk on other days for a goal of 30 minutes.      Healthy Eating (pt-stated)     Notes - Note created  8/22/2018  1:20 PM by Trisha Timmons RD    Goal Statement: choose high fiber carbohydrate most of the time  Supportive Steps to Achieve: read labels and meal plan  Strengths: motivated and demonstrates understanding              Equal Access to Services     GENE NOWAK : Valerie Ma, waloida denny, dian martinezalgeetha brizuela, etta hardy. So [facility] [phone].    ATENCIÓN: Si habla español, tiene a jason disposición servicios gratuitos de asistencia lingüística. Llame al [phone].    We comply with applicable federal civil rights laws and [place] laws. We do not discriminate on the basis of race, color, national origin, age, disability, sex, sexual orientation, or gender identity.            Thank you!     Thank you for choosing [facility]  for your care. Our goal is always to provide you with excellent care. Hearing back from our patients is one way we can continue to improve our services. Please take a few minutes to complete the written survey that you may receive in the mail after your visit with us. Thank you!             Your Updated Medication List - Protect others around you: Learn how to safely use, store and throw away your medicines at www.disposemymeds.org.          This  list is accurate as of 8/27/18  1:44 PM.  Always use your most recent med list.                   Brand Name Dispense Instructions for use Diagnosis    acetaminophen 500 MG Caps      Take 1,000 mg by mouth nightly as needed Take 500-1,000 mg by mouth every 6 hours if needed.        ARTIFICIAL TEARS OP      Apply 1 drop to eye as needed        BENEFIBER Powd      2 teaspoons daily        blood glucose monitoring meter device kit    no brand specified    1 kit    Use to test blood sugar 2times daily or as directed.    Type 2 diabetes mellitus with stage 3 chronic kidney disease, without long-term current use of insulin (H)       blood glucose monitoring test strip    no brand specified    200 each    Use to test blood sugar 2 times daily, before breakfast and before bedtime    Type 2 diabetes mellitus with stage 3 chronic kidney disease, without long-term current use of insulin (H)       calcitRIOL 0.5 MCG capsule    ROCALTROL    90 capsule    Take 1 capsule (0.5 mcg) by mouth daily    Postablative hypoparathyroidism (H)       ciprofloxacin-dexamethasone otic suspension    CIPRODEX    5.6 mL    Place 4 drops Into the left ear 2 times daily    Other infective chronic otitis externa of left ear, Tympanic membrane perforation, left, Sensorineural hearing loss (SNHL) of both ears       * clotrimazole 1 % cream    LOTRIMIN     Apply topically 2 times daily as needed Reported on 4/25/2017        * clotrimazole 1 % solution    LOTRIMIN    28 mL    Apply 1 mL topically 2 times daily for 14 days Correct instruction - 4 drops LEFT ear twice daily for 14 days    Other infective chronic otitis externa of left ear, S/P tympanoplasty       ELIQUIS 2.5 MG tablet   Generic drug:  apixaban ANTICOAGULANT      Take 2.5 mg by mouth 2 times daily        estradiol 0.1 MG/GM cream    ESTRACE VAGINAL    42.5 g    Place 2 g vaginally twice a week    Atrophic vaginitis       ezetimibe 10 MG tablet    ZETIA    90 tablet    TAKE 1 TABLET BY  MOUTH EVERY DAY OR AS DIRECTED BY DR WOLF    Hyperlipidemia LDL goal <70, Benign essential hypertension       ferrous gluconate 324 (38 Fe) MG tablet    FERGON    90 tablet    Take 1 tablet (324 mg) by mouth 3 times daily (with meals)    Liver replaced by transplant (H)       folic acid 1 MG tablet    FOLVITE    100 tablet    Take 1 tablet (1 mg) by mouth daily    Liver replaced by transplant (H)       furosemide 20 MG tablet    LASIX    45 tablet    TAKE 1/2 TABLET BY MOUTH EVERY DAY    CKD (chronic kidney disease) stage 3, GFR 30-59 ml/min, Liver replaced by transplant (H)       hydrALAZINE 25 MG tablet    APRESOLINE    270 tablet    Take 0.5 tablets (12.5 mg) by mouth 3 times daily as needed , if systolic blood pressure is more than 140 mmHg.    HTN (hypertension)       ketoconazole 2 % cream    NIZORAL    15 g    Apply topically 2 times daily To angles of mouth    Angular cheilitis       LOVAZA 1 g capsule   Generic drug:  omega-3 acid ethyl esters     360 capsule    Take 1 capsule (1 g) by mouth 2 times daily    Hypertriglyceridemia       meclizine 25 MG tablet    ANTIVERT    30 tablet    Take 1 tablet (25 mg) by mouth as needed    Dizziness       metoprolol succinate 50 MG 24 hr tablet    TOPROL-XL    90 tablet    Take 1 tablet (50 mg) by mouth daily    Paroxysmal atrial fibrillation (H)       neomycin-polymyxin-hydrocortisone 3.5-45615-9 otic suspension    CORTISPORIN    10 mL    Place 4 drops Into the left ear 4 times daily    Acute swimmer's ear of left side       nitroFURantoin (macrocrystal-monohydrate) 100 MG capsule    MACROBID    14 capsule    Take 1 capsule (100 mg) by mouth 2 times daily For one week at onset of UTI symptoms    Urinary tract infection without hematuria, site unspecified       predniSONE 5 MG tablet    DELTASONE    90 tablet    Take 1 tablet (5 mg) by mouth daily    Thyroid cancer (H), Liver replaced by transplant (H)       PRESERVISION AREDS 2 Caps      Take 1 capsule by mouth 2  times daily        sertraline 50 MG tablet    ZOLOFT    90 tablet    Take 1 tablet (50 mg) by mouth daily    Adjustment disorder with depressed mood       sirolimus 1 MG Tabs tablet     45 tablet    Take 1 tablet (1 mg) by mouth every other day    Liver replaced by transplant (H)       SUMAtriptan 50 MG tablet    IMITREX    30 tablet    Take 1 tablet (50 mg) by mouth at onset of headache for migraine repeat after 2 hours if needed.    Migraine without aura and without status migrainosus, not intractable       * SYNTHROID 150 MCG tablet   Generic drug:  levothyroxine      Take 225 mcg by mouth on Mondays        * levothyroxine 150 MCG tablet    SYNTHROID    120 tablet    Take 150mcg by mouth daily 6 days a week & 225mcg on Mondays    Malignant neoplasm of thyroid gland (H)       triamcinolone 0.1 % cream    KENALOG     Apply topically 2 times daily as needed for irritation        ursodiol 250 MG tablet    ACTIGALL    180 tablet    Take 1 tablet (250 mg) by mouth 2 times daily    Liver replaced by transplant (H)       voriconazole 200 MG tablet    VFEND    60 tablet    Take 1 tablet (200 mg) by mouth 2 times daily    Coccidioidal pneumonitis (H)       * Notice:  This list has 4 medication(s) that are the same as other medications prescribed for you. Read the directions carefully, and ask your doctor or other care provider to review them with you.

## 2025-03-06 ENCOUNTER — TELEPHONE (OUTPATIENT)
Dept: OTOLARYNGOLOGY | Facility: CLINIC | Age: 75
End: 2025-03-06
Payer: MEDICARE

## 2025-03-06 ENCOUNTER — TELEPHONE (OUTPATIENT)
Dept: INFECTIOUS DISEASES | Facility: CLINIC | Age: 75
End: 2025-03-06
Payer: MEDICARE

## 2025-03-06 NOTE — TELEPHONE ENCOUNTER
Health Call Center    Phone Message    May a detailed message be left on voicemail: yes     Reason for Call: Other: Patient called with a referral for Tympanic membrane perforation, left. P1 diagnosis. Sending TE for clinic to review if patient needs to be seen sooner. She requested Dr. Mejia since she previously had established care with him. Thank you.      Action Taken: Message routed to:  Other: ENT    Travel Screening: Not Applicable     Date of Service:

## 2025-03-06 NOTE — CONFIDENTIAL NOTE
Writer called and spoke with pt regarding call to schedule appointment with Dr. Mejia. Pt currently scheduled for 6/6/25 with Dr. Mejia. Pt clinically should be seen sooner- writer offered pt sooner appointment availability other providers at [place] location, but pt adamantly refused stating she will only see Dr. Mejia. Pt declined scheduling with another provider. Informed pt she is added to the wait list and will be called if there is availability sooner with Dr. Mejia. Pt verbalized understanding and is agreeable to date/time/location/reason for appointment.     Frieda Car RN on 3/6/2025 at 12:42 PM

## 2025-03-06 NOTE — TELEPHONE ENCOUNTER
SANDEEP RODRIGUEZ 3/6 to sched a follow up with Dr. Lundberg for 1-2 weeks after 4/8 (Offer 4/25) per provider.

## 2025-03-07 ENCOUNTER — TELEPHONE (OUTPATIENT)
Dept: CARDIOLOGY | Facility: CLINIC | Age: 75
End: 2025-03-07

## 2025-03-07 NOTE — TELEPHONE ENCOUNTER
Patient left a message saying her BP has been no lower than 144/66 for the past week. She wonders if additional adjustments are needed to her BP meds. Pt was started on hydralazine 25mg TID on 2/27/25. She also takes amlodipine 5mg daily and toprol XL 25mg daily (declined coreg).

## 2025-03-08 ENCOUNTER — HOSPITAL ENCOUNTER (INPATIENT)
Facility: CLINIC | Age: 75
DRG: 871 | End: 2025-03-08
Attending: INTERNAL MEDICINE | Admitting: STUDENT IN AN ORGANIZED HEALTH CARE EDUCATION/TRAINING PROGRAM
Payer: MEDICARE

## 2025-03-08 ENCOUNTER — APPOINTMENT (OUTPATIENT)
Dept: CT IMAGING | Facility: CLINIC | Age: 75
DRG: 871 | End: 2025-03-08
Attending: PHYSICIAN ASSISTANT
Payer: MEDICARE

## 2025-03-08 ENCOUNTER — APPOINTMENT (OUTPATIENT)
Dept: GENERAL RADIOLOGY | Facility: CLINIC | Age: 75
DRG: 871 | End: 2025-03-08
Attending: PHYSICIAN ASSISTANT
Payer: MEDICARE

## 2025-03-08 ENCOUNTER — TELEPHONE (OUTPATIENT)
Dept: MULTI SPECIALTY CLINIC | Facility: CLINIC | Age: 75
End: 2025-03-08
Payer: MEDICARE

## 2025-03-08 DIAGNOSIS — B95.2 BACTEREMIA DUE TO ENTEROCOCCUS: ICD-10-CM

## 2025-03-08 DIAGNOSIS — N10 PYELONEPHRITIS, ACUTE: ICD-10-CM

## 2025-03-08 DIAGNOSIS — R10.84 GENERALIZED ABDOMINAL PAIN: ICD-10-CM

## 2025-03-08 DIAGNOSIS — A41.9 SEPSIS SECONDARY TO UTI (H): ICD-10-CM

## 2025-03-08 DIAGNOSIS — N30.20 BLADDER INFECTION, CHRONIC: Primary | ICD-10-CM

## 2025-03-08 DIAGNOSIS — R78.81 BACTEREMIA DUE TO ENTEROCOCCUS: ICD-10-CM

## 2025-03-08 DIAGNOSIS — R79.89 ABNORMAL LIVER FUNCTION TESTS: ICD-10-CM

## 2025-03-08 DIAGNOSIS — D84.9 IMMUNOSUPPRESSED STATUS: ICD-10-CM

## 2025-03-08 DIAGNOSIS — N39.0 SEPSIS SECONDARY TO UTI (H): ICD-10-CM

## 2025-03-08 DIAGNOSIS — N17.9 AKI (ACUTE KIDNEY INJURY): ICD-10-CM

## 2025-03-08 DIAGNOSIS — Z22.358 ESBL ESCHERICHIA COLI CARRIER: ICD-10-CM

## 2025-03-08 DIAGNOSIS — R10.9 LEFT FLANK PAIN: ICD-10-CM

## 2025-03-08 DIAGNOSIS — Z79.2 RECEIVING INTRAVENOUS ANTIBIOTIC TREATMENT AS OUTPATIENT: ICD-10-CM

## 2025-03-08 DIAGNOSIS — Z94.4 H/O LIVER TRANSPLANT (H): ICD-10-CM

## 2025-03-08 LAB
ALBUMIN SERPL BCG-MCNC: 3.5 G/DL (ref 3.5–5.2)
ALBUMIN UR-MCNC: 50 MG/DL
ALP SERPL-CCNC: 110 U/L (ref 40–150)
ALT SERPL W P-5'-P-CCNC: 61 U/L (ref 0–50)
ANION GAP SERPL CALCULATED.3IONS-SCNC: 11 MMOL/L (ref 7–15)
APPEARANCE UR: ABNORMAL
AST SERPL W P-5'-P-CCNC: 58 U/L (ref 0–45)
BACTERIA #/AREA URNS HPF: ABNORMAL /HPF
BASOPHILS # BLD AUTO: 0 10E3/UL (ref 0–0.2)
BASOPHILS NFR BLD AUTO: 0 %
BILIRUB SERPL-MCNC: 0.5 MG/DL
BILIRUB UR QL STRIP: NEGATIVE
BUN SERPL-MCNC: 51.3 MG/DL (ref 8–23)
C PNEUM DNA SPEC QL NAA+PROBE: NOT DETECTED
CALCIUM SERPL-MCNC: 10.4 MG/DL (ref 8.8–10.4)
CHLORIDE SERPL-SCNC: 103 MMOL/L (ref 98–107)
COLOR UR AUTO: ABNORMAL
CREAT SERPL-MCNC: 1.56 MG/DL (ref 0.51–0.95)
EGFRCR SERPLBLD CKD-EPI 2021: 34 ML/MIN/1.73M2
EOSINOPHIL # BLD AUTO: 0 10E3/UL (ref 0–0.7)
EOSINOPHIL NFR BLD AUTO: 0 %
ERYTHROCYTE [DISTWIDTH] IN BLOOD BY AUTOMATED COUNT: 17.1 % (ref 10–15)
FLUAV H1 2009 PAND RNA SPEC QL NAA+PROBE: NOT DETECTED
FLUAV H1 RNA SPEC QL NAA+PROBE: NOT DETECTED
FLUAV H3 RNA SPEC QL NAA+PROBE: NOT DETECTED
FLUAV RNA SPEC QL NAA+PROBE: NEGATIVE
FLUAV RNA SPEC QL NAA+PROBE: NOT DETECTED
FLUBV RNA RESP QL NAA+PROBE: NEGATIVE
FLUBV RNA SPEC QL NAA+PROBE: NOT DETECTED
GLUCOSE SERPL-MCNC: 183 MG/DL (ref 70–99)
GLUCOSE UR STRIP-MCNC: NEGATIVE MG/DL
HADV DNA SPEC QL NAA+PROBE: NOT DETECTED
HCO3 SERPL-SCNC: 24 MMOL/L (ref 22–29)
HCOV PNL SPEC NAA+PROBE: NOT DETECTED
HCT VFR BLD AUTO: 34.2 % (ref 35–47)
HGB BLD-MCNC: 10.8 G/DL (ref 11.7–15.7)
HGB UR QL STRIP: ABNORMAL
HMPV RNA SPEC QL NAA+PROBE: NOT DETECTED
HOLD SPECIMEN: NORMAL
HPIV1 RNA SPEC QL NAA+PROBE: NOT DETECTED
HPIV2 RNA SPEC QL NAA+PROBE: NOT DETECTED
HPIV3 RNA SPEC QL NAA+PROBE: NOT DETECTED
HPIV4 RNA SPEC QL NAA+PROBE: NOT DETECTED
IMM GRANULOCYTES # BLD: 0.3 10E3/UL
IMM GRANULOCYTES NFR BLD: 1 %
KETONES UR STRIP-MCNC: NEGATIVE MG/DL
LACTATE SERPL-SCNC: 1 MMOL/L (ref 0.7–2)
LACTATE SERPL-SCNC: 2.2 MMOL/L (ref 0.7–2)
LEUKOCYTE ESTERASE UR QL STRIP: ABNORMAL
LYMPHOCYTES # BLD AUTO: 0.9 10E3/UL (ref 0.8–5.3)
LYMPHOCYTES NFR BLD AUTO: 5 %
M PNEUMO DNA SPEC QL NAA+PROBE: NOT DETECTED
MCH RBC QN AUTO: 27 PG (ref 26.5–33)
MCHC RBC AUTO-ENTMCNC: 31.6 G/DL (ref 31.5–36.5)
MCV RBC AUTO: 86 FL (ref 78–100)
MONOCYTES # BLD AUTO: 1.3 10E3/UL (ref 0–1.3)
MONOCYTES NFR BLD AUTO: 7 %
MRSA DNA SPEC QL NAA+PROBE: NEGATIVE
NEUTROPHILS # BLD AUTO: 16.4 10E3/UL (ref 1.6–8.3)
NEUTROPHILS NFR BLD AUTO: 87 %
NITRATE UR QL: POSITIVE
NRBC # BLD AUTO: 0 10E3/UL
NRBC BLD AUTO-RTO: 0 /100
NT-PROBNP SERPL-MCNC: 963 PG/ML (ref 0–900)
PH UR STRIP: 6 [PH] (ref 5–7)
PLATELET # BLD AUTO: 346 10E3/UL (ref 150–450)
POTASSIUM SERPL-SCNC: 4.4 MMOL/L (ref 3.4–5.3)
PROCALCITONIN SERPL IA-MCNC: 0.11 NG/ML
PROT SERPL-MCNC: 6.5 G/DL (ref 6.4–8.3)
RBC # BLD AUTO: 4 10E6/UL (ref 3.8–5.2)
RBC URINE: 13 /HPF
RSV RNA SPEC NAA+PROBE: NEGATIVE
RSV RNA SPEC QL NAA+PROBE: NOT DETECTED
RSV RNA SPEC QL NAA+PROBE: NOT DETECTED
RV+EV RNA SPEC QL NAA+PROBE: NOT DETECTED
SA TARGET DNA: NEGATIVE
SARS-COV-2 RNA RESP QL NAA+PROBE: NEGATIVE
SODIUM SERPL-SCNC: 138 MMOL/L (ref 135–145)
SP GR UR STRIP: 1.01 (ref 1–1.03)
SQUAMOUS EPITHELIAL: 6 /HPF
TROPONIN T SERPL HS-MCNC: 29 NG/L
TROPONIN T SERPL HS-MCNC: 30 NG/L
UROBILINOGEN UR STRIP-MCNC: NORMAL MG/DL
WBC # BLD AUTO: 18.9 10E3/UL (ref 4–11)
WBC CLUMPS #/AREA URNS HPF: PRESENT /HPF
WBC URINE: >182 /HPF

## 2025-03-08 PROCEDURE — 250N000011 HC RX IP 250 OP 636: Performed by: PHYSICIAN ASSISTANT

## 2025-03-08 PROCEDURE — 250N000013 HC RX MED GY IP 250 OP 250 PS 637: Performed by: STUDENT IN AN ORGANIZED HEALTH CARE EDUCATION/TRAINING PROGRAM

## 2025-03-08 PROCEDURE — 82247 BILIRUBIN TOTAL: CPT | Performed by: PHYSICIAN ASSISTANT

## 2025-03-08 PROCEDURE — 84145 PROCALCITONIN (PCT): CPT | Performed by: PHYSICIAN ASSISTANT

## 2025-03-08 PROCEDURE — 96374 THER/PROPH/DIAG INJ IV PUSH: CPT | Performed by: INTERNAL MEDICINE

## 2025-03-08 PROCEDURE — 258N000003 HC RX IP 258 OP 636: Performed by: PHYSICIAN ASSISTANT

## 2025-03-08 PROCEDURE — 81001 URINALYSIS AUTO W/SCOPE: CPT | Performed by: PHYSICIAN ASSISTANT

## 2025-03-08 PROCEDURE — 74176 CT ABD & PELVIS W/O CONTRAST: CPT

## 2025-03-08 PROCEDURE — 84155 ASSAY OF PROTEIN SERUM: CPT | Performed by: PHYSICIAN ASSISTANT

## 2025-03-08 PROCEDURE — 71045 X-RAY EXAM CHEST 1 VIEW: CPT

## 2025-03-08 PROCEDURE — 87640 STAPH A DNA AMP PROBE: CPT | Performed by: INTERNAL MEDICINE

## 2025-03-08 PROCEDURE — 99418 PROLNG IP/OBS E/M EA 15 MIN: CPT | Mod: AI | Performed by: STUDENT IN AN ORGANIZED HEALTH CARE EDUCATION/TRAINING PROGRAM

## 2025-03-08 PROCEDURE — 87637 SARSCOV2&INF A&B&RSV AMP PRB: CPT | Performed by: PHYSICIAN ASSISTANT

## 2025-03-08 PROCEDURE — 84484 ASSAY OF TROPONIN QUANT: CPT | Performed by: PHYSICIAN ASSISTANT

## 2025-03-08 PROCEDURE — 87641 MR-STAPH DNA AMP PROBE: CPT | Performed by: INTERNAL MEDICINE

## 2025-03-08 PROCEDURE — 36415 COLL VENOUS BLD VENIPUNCTURE: CPT | Performed by: PHYSICIAN ASSISTANT

## 2025-03-08 PROCEDURE — 99285 EMERGENCY DEPT VISIT HI MDM: CPT | Mod: 25 | Performed by: INTERNAL MEDICINE

## 2025-03-08 PROCEDURE — 120N000002 HC R&B MED SURG/OB UMMC

## 2025-03-08 PROCEDURE — 82040 ASSAY OF SERUM ALBUMIN: CPT | Performed by: PHYSICIAN ASSISTANT

## 2025-03-08 PROCEDURE — 250N000011 HC RX IP 250 OP 636: Performed by: INTERNAL MEDICINE

## 2025-03-08 PROCEDURE — 36415 COLL VENOUS BLD VENIPUNCTURE: CPT | Performed by: INTERNAL MEDICINE

## 2025-03-08 PROCEDURE — 96361 HYDRATE IV INFUSION ADD-ON: CPT | Performed by: INTERNAL MEDICINE

## 2025-03-08 PROCEDURE — 71045 X-RAY EXAM CHEST 1 VIEW: CPT | Mod: 26 | Performed by: STUDENT IN AN ORGANIZED HEALTH CARE EDUCATION/TRAINING PROGRAM

## 2025-03-08 PROCEDURE — 258N000003 HC RX IP 258 OP 636: Performed by: INTERNAL MEDICINE

## 2025-03-08 PROCEDURE — 87186 SC STD MICRODIL/AGAR DIL: CPT | Performed by: PHYSICIAN ASSISTANT

## 2025-03-08 PROCEDURE — 87040 BLOOD CULTURE FOR BACTERIA: CPT | Performed by: PHYSICIAN ASSISTANT

## 2025-03-08 PROCEDURE — 87149 DNA/RNA DIRECT PROBE: CPT | Performed by: PHYSICIAN ASSISTANT

## 2025-03-08 PROCEDURE — 87581 M.PNEUMON DNA AMP PROBE: CPT | Performed by: PHYSICIAN ASSISTANT

## 2025-03-08 PROCEDURE — 85004 AUTOMATED DIFF WBC COUNT: CPT | Performed by: PHYSICIAN ASSISTANT

## 2025-03-08 PROCEDURE — 99223 1ST HOSP IP/OBS HIGH 75: CPT | Mod: AI | Performed by: STUDENT IN AN ORGANIZED HEALTH CARE EDUCATION/TRAINING PROGRAM

## 2025-03-08 PROCEDURE — 99285 EMERGENCY DEPT VISIT HI MDM: CPT | Mod: FS | Performed by: INTERNAL MEDICINE

## 2025-03-08 PROCEDURE — 83605 ASSAY OF LACTIC ACID: CPT | Performed by: INTERNAL MEDICINE

## 2025-03-08 PROCEDURE — 83880 ASSAY OF NATRIURETIC PEPTIDE: CPT | Performed by: PHYSICIAN ASSISTANT

## 2025-03-08 PROCEDURE — 87486 CHLMYD PNEUM DNA AMP PROBE: CPT | Performed by: PHYSICIAN ASSISTANT

## 2025-03-08 PROCEDURE — 74176 CT ABD & PELVIS W/O CONTRAST: CPT | Mod: 26 | Performed by: STUDENT IN AN ORGANIZED HEALTH CARE EDUCATION/TRAINING PROGRAM

## 2025-03-08 RX ORDER — EZETIMIBE 10 MG/1
10 TABLET ORAL DAILY
Status: DISCONTINUED | OUTPATIENT
Start: 2025-03-09 | End: 2025-03-14 | Stop reason: HOSPADM

## 2025-03-08 RX ORDER — PREDNISONE 5 MG/1
10 TABLET ORAL DAILY
Status: DISCONTINUED | OUTPATIENT
Start: 2025-03-09 | End: 2025-03-11

## 2025-03-08 RX ORDER — ACETAMINOPHEN 325 MG/1
650 TABLET ORAL EVERY 4 HOURS PRN
Status: DISCONTINUED | OUTPATIENT
Start: 2025-03-08 | End: 2025-03-14 | Stop reason: HOSPADM

## 2025-03-08 RX ORDER — GABAPENTIN 250 MG/5ML
300 SOLUTION ORAL AT BEDTIME
Status: DISCONTINUED | OUTPATIENT
Start: 2025-03-08 | End: 2025-03-14 | Stop reason: HOSPADM

## 2025-03-08 RX ORDER — MEROPENEM 1 G/1
1 INJECTION, POWDER, FOR SOLUTION INTRAVENOUS ONCE
Status: COMPLETED | OUTPATIENT
Start: 2025-03-08 | End: 2025-03-08

## 2025-03-08 RX ORDER — SIROLIMUS 1 MG/1
1 TABLET, FILM COATED ORAL DAILY
Status: DISCONTINUED | OUTPATIENT
Start: 2025-03-09 | End: 2025-03-14 | Stop reason: HOSPADM

## 2025-03-08 RX ORDER — FOLIC ACID 1 MG/1
1000 TABLET ORAL DAILY
Status: DISCONTINUED | OUTPATIENT
Start: 2025-03-09 | End: 2025-03-14 | Stop reason: HOSPADM

## 2025-03-08 RX ORDER — IOPAMIDOL 755 MG/ML
104 INJECTION, SOLUTION INTRAVASCULAR ONCE
Status: COMPLETED | OUTPATIENT
Start: 2025-03-08 | End: 2025-03-09

## 2025-03-08 RX ORDER — AMOXICILLIN 250 MG
2 CAPSULE ORAL 2 TIMES DAILY PRN
Status: DISCONTINUED | OUTPATIENT
Start: 2025-03-08 | End: 2025-03-14 | Stop reason: HOSPADM

## 2025-03-08 RX ORDER — AMLODIPINE BESYLATE 5 MG/1
5 TABLET ORAL DAILY
Status: DISCONTINUED | OUTPATIENT
Start: 2025-03-09 | End: 2025-03-09

## 2025-03-08 RX ORDER — AMOXICILLIN 250 MG
1 CAPSULE ORAL 2 TIMES DAILY PRN
Status: DISCONTINUED | OUTPATIENT
Start: 2025-03-08 | End: 2025-03-14 | Stop reason: HOSPADM

## 2025-03-08 RX ORDER — CEFAZOLIN SODIUM 1 G/50ML
1750 SOLUTION INTRAVENOUS ONCE
Status: COMPLETED | OUTPATIENT
Start: 2025-03-08 | End: 2025-03-08

## 2025-03-08 RX ORDER — URSODIOL 250 MG/1
250 TABLET, FILM COATED ORAL 2 TIMES DAILY
Status: DISCONTINUED | OUTPATIENT
Start: 2025-03-08 | End: 2025-03-14 | Stop reason: HOSPADM

## 2025-03-08 RX ORDER — VANCOMYCIN HYDROCHLORIDE 1 G/200ML
1000 INJECTION, SOLUTION INTRAVENOUS EVERY 24 HOURS
Status: DISCONTINUED | OUTPATIENT
Start: 2025-03-09 | End: 2025-03-08

## 2025-03-08 RX ORDER — ANTIOX #8/OM3/DHA/EPA/LUT/ZEAX 250-2.5 MG
1 CAPSULE ORAL 2 TIMES DAILY
Status: DISCONTINUED | OUTPATIENT
Start: 2025-03-08 | End: 2025-03-08

## 2025-03-08 RX ORDER — KETOCONAZOLE 20 MG/G
CREAM TOPICAL 2 TIMES DAILY PRN
Status: DISCONTINUED | OUTPATIENT
Start: 2025-03-08 | End: 2025-03-14 | Stop reason: HOSPADM

## 2025-03-08 RX ORDER — MECLIZINE HYDROCHLORIDE 25 MG/1
25 TABLET ORAL 3 TIMES DAILY PRN
Status: DISCONTINUED | OUTPATIENT
Start: 2025-03-08 | End: 2025-03-14 | Stop reason: HOSPADM

## 2025-03-08 RX ORDER — HYDRALAZINE HYDROCHLORIDE 25 MG/1
25 TABLET, FILM COATED ORAL 3 TIMES DAILY
Status: DISCONTINUED | OUTPATIENT
Start: 2025-03-08 | End: 2025-03-11

## 2025-03-08 RX ORDER — ONDANSETRON 4 MG/1
4 TABLET, ORALLY DISINTEGRATING ORAL EVERY 6 HOURS PRN
Status: DISCONTINUED | OUTPATIENT
Start: 2025-03-08 | End: 2025-03-14 | Stop reason: HOSPADM

## 2025-03-08 RX ORDER — MEROPENEM 1 G/1
1 INJECTION, POWDER, FOR SOLUTION INTRAVENOUS EVERY 12 HOURS
Status: DISCONTINUED | OUTPATIENT
Start: 2025-03-09 | End: 2025-03-09

## 2025-03-08 RX ORDER — METOPROLOL SUCCINATE 25 MG/1
25 TABLET, EXTENDED RELEASE ORAL DAILY
Status: DISCONTINUED | OUTPATIENT
Start: 2025-03-09 | End: 2025-03-14 | Stop reason: HOSPADM

## 2025-03-08 RX ORDER — CALCITRIOL 0.25 UG/1
0.25 CAPSULE, LIQUID FILLED ORAL DAILY
Status: DISCONTINUED | OUTPATIENT
Start: 2025-03-09 | End: 2025-03-14 | Stop reason: HOSPADM

## 2025-03-08 RX ORDER — ACETAMINOPHEN 650 MG/1
650 SUPPOSITORY RECTAL EVERY 4 HOURS PRN
Status: DISCONTINUED | OUTPATIENT
Start: 2025-03-08 | End: 2025-03-14 | Stop reason: HOSPADM

## 2025-03-08 RX ORDER — LIDOCAINE 40 MG/G
CREAM TOPICAL
Status: DISCONTINUED | OUTPATIENT
Start: 2025-03-08 | End: 2025-03-14

## 2025-03-08 RX ORDER — KETOCONAZOLE 20 MG/ML
SHAMPOO, SUSPENSION TOPICAL
Status: DISCONTINUED | OUTPATIENT
Start: 2025-03-10 | End: 2025-03-14 | Stop reason: HOSPADM

## 2025-03-08 RX ADMIN — GABAPENTIN 300 MG: 250 SOLUTION ORAL at 21:29

## 2025-03-08 RX ADMIN — HYDRALAZINE HYDROCHLORIDE 25 MG: 25 TABLET ORAL at 21:29

## 2025-03-08 RX ADMIN — SODIUM CHLORIDE 500 ML: 0.9 INJECTION, SOLUTION INTRAVENOUS at 17:11

## 2025-03-08 RX ADMIN — APIXABAN 5 MG: 5 TABLET, FILM COATED ORAL at 21:01

## 2025-03-08 RX ADMIN — SODIUM CHLORIDE 500 ML: 0.9 INJECTION, SOLUTION INTRAVENOUS at 19:32

## 2025-03-08 RX ADMIN — URSODIOL 250 MG: 250 TABLET ORAL at 21:29

## 2025-03-08 RX ADMIN — VANCOMYCIN HYDROCHLORIDE 1750 MG: 1 INJECTION, POWDER, LYOPHILIZED, FOR SOLUTION INTRAVENOUS at 19:31

## 2025-03-08 RX ADMIN — MEROPENEM 1 G: 1 INJECTION, POWDER, FOR SOLUTION INTRAVENOUS at 18:12

## 2025-03-08 ASSESSMENT — ACTIVITIES OF DAILY LIVING (ADL)
ADLS_ACUITY_SCORE: 58
ADLS_ACUITY_SCORE: 60
ADLS_ACUITY_SCORE: 58
ADLS_ACUITY_SCORE: 58
ADLS_ACUITY_SCORE: 60
ADLS_ACUITY_SCORE: 58

## 2025-03-08 NOTE — ED PROVIDER NOTES
ED Provider Note  [facility]      History     Chief Complaint   Patient presents with    Fever     HPI    Luz Thompson is a 74 year old female with extensive past medical history including history of prior liver transplant in 2022, history of type 2 diabetes, hypertension, anxiety, coccidiomycosis, history of chronic UTI, multiple antibiotic allergies, who presents the emergency room with concerns for fever.  Patient presents alone.  She notes this morning she woke up with new fever Tmax 102.  She reports that she has not had any headache neck stiffness runny nose sore throat cough or chest pain.  She states she has had some mild shortness of breath with exertion which she attributes to not eating or drinking much today.  No abdominal pain vomiting diarrhea urinary symptoms.  No rashes.  She has been able to tolerate her transplant meds.  No known sick contacts.      Physical Exam   BP: (!) 146/67  Pulse: 91  Temp: 100.6  F (38.1  C)  Resp: 16  SpO2: 100 %  Physical Exam      GENERAL APPEARANCE: The patient is well developed, well appearing, and in no acute distress.  HEAD:  Normocephalic.  EENT: Voice normal.  Uvula midline without exudates.  NECK: Trachea is midline.  Uvula midline without exudates  LUNGS: Breath sounds are equal and clear bilaterally. No wheezes, rhonchi, or rales.  HEART: Regular rate and normal rhythm.   ABDOMEN: Soft, flat, and benign. No mass, tenderness, guarding, or rebound.  EXTREMITIES: No cyanosis, clubbing, or edema.  NEUROLOGIC: No focal sensory or motor deficits are noted.  PSYCHIATRIC: The patient is awake, alert.  Appropriate mood and affect.  SKIN: Warm, dry, and well perfused.      ED Course, Procedures, & Data     ED Course as of 03/08/25 2046   Sat Mar 08, 2025   1721 Spoke with lab, they are working on getting CBC to result     The patient has signs of sepsis   Sepsis ED evaluation   The patient has signs of sepsis as evidenced by:  1. Presence  of 2 SIRS criteria, suspected infection, AND  2. Organ dysfunction: Lactic Acidosis with value >2.0 due to sepsis    Sepsis Care Initiation: Starting at 6:20 PM  on 03/08/25, until specified. Prior to this documentation, sepsis, severe sepsis, or septic shock was NOT thought to be a significant cause of illness. This order represents the first time infection was seriously considered to be affecting the patient.    Lactic Acid Results:  Recent Labs   Lab Test 03/08/25  1852 03/08/25  1606 01/19/25  1908   LACT 1.0 2.2* 1.0       3 Hour Bundle 6 Hour Bundle (Reassessment)   Blood Cultures before IV Antibiotics: Yes  Antibiotics given: see below  Prehospital fluid volume (mL):                     Total fluids given (ED +Pre-hospital):  The patient responded to a lesser volume of IV fluids. The initial volume ordered was 1000 mL.    Repeat Lactic Acid Level: Ordered by reflex for 2 hours after initial lactic acid collection.  Vasopressors: MAP>65 after initial IVF bolus, will continue to monitor fluid status and vital signs.  Repeat perfusion exam: I attest to having performed a repeat sepsis exam and assessment of perfusion at 6:21 PM .   BMI Readings from Last 1 Encounters:   02/14/25 26.23 kg/m        Anti-infectives (From admission through now)      Start     Dose/Rate Route Frequency Ordered Stop    03/08/25 1755  vancomycin (VANCOCIN) 1,750 mg in sodium chloride 0.9 % 567.5 mL intermittent infusion         1,750 mg  over 2 Hours Intravenous ONCE 03/08/25 1754      03/08/25 1740  meropenem (MERREM) 1 g vial to attach to  mL bag         1 g  over 30 Minutes Intravenous ONCE 03/08/25 1735 03/08/25 1842              IV Antibiotics given and/or elevated Lactate of 2.2 and no sepsis note found - Delete this reminder and enter the sepsis note or '.edcms' before signing chart.>>>EKG 3/8/2025: Normal sinus rhythm, ventricular rate 90 QTc 430.  On my interpretation the rhythm is regular.  There is a P wave before  every QRS complex.  No visible ST elevation or depression to suggest ischemia.     Results for orders placed or performed during the hospital encounter of 03/08/25   XR Chest 1 View     Status: None    Narrative    Exam: XR CHEST 1 VIEW, 3/8/2025 4:35 PM    Comparison: 1/19/2025    History: Fever    Findings:    Single AP portable chest.    Trachea is midline. Stable cardiac mediastinal silhouette. Similar  metallic opacity/device projecting over the left chestThere is no  discernible pneumothorax or pleural effusion. streaky right basilar  opacities      Impression    Impression: Streaky right basilar opacities, may represent  atelectasis, scarring or infiltrate in the appropriate clinical  settings.    I have personally reviewed the examination and initial interpretation  and I agree with the findings.    ROSENDO HIRSCH MD         SYSTEM ID:  [id number]   CT Abdomen Pelvis w/o Contrast     Status: None (Preliminary result)    Narrative    EXAMINATION: CT ABDOMEN PELVIS W/O CONTRAST, 3/8/2025 6:38 PM    INDICATION: History liver transplant, fever, mildly elevated LFTs  evaluate cholangitis patient declining contrast    COMPARISON STUDY: CT chest and pelvis 1/22/2025    TECHNIQUE: CT scan of the abdomen and pelvis was performed on  multidetector CT scanner using volumetric acquisition technique and  images were reconstructed in multiple planes with variable thickness  and reviewed on dedicated workstations.     CONTRAST: None    CT scan radiation dose is optimized to minimum requisite dose using  automated dose modulation techniques.    FINDINGS:      Similar right lung base calcifications. No pleural effusion. No  pericardial effusion.    Limited evaluation of abdominal parenchymal organs due to noncontrast  technique.     Postsurgical changes of liver transplant. Curvilinear hyperdensity  seen along the inferior right lobe (3/67 and 4/42, may represent  peripheral biliary ductal dilatation or edema, similar to  more  conspicuous compared to prior CT from 1/22/2025 . Gallbladder is  surgically absent.      Unremarkable noncontrast appearance of the adrenal glands, spleen  atrophic appearing pancreas. Similar likely pancreatic cyst measuring  1.4 cm, stable. No pancreatic ductal dilatation. Scattered osseous  sclerotic calcification of the abdominal aorta without aneurysmal  dilatation. Colonic diverticulosis without evidence for  diverticulitis. Small fat-containing ventral hernia.  No high-grade bowel obstruction. No ascites. Similar vascular  calcification/nonobstructive renal calculi. No new renal mass or  hydronephrosis.. No pelvic mass.          Impression    IMPRESSION:       1. Postsurgical changes of liver transplant, subtle curvilinear  hypodensity in the inferior liver (4/42 and 3/67 may represent  peripheral biliary ductal dilatation or edema, similar to slightly  more conspicuous compared to prior CT scan; if persistent clinical  concern for cholangitis, consider gastroenterology consult and/or  MRCP.  2. Fluid density subcentimeter lesion in the pancreatic head region,  may represent pancreatic cysts/IPMN, consider follow-up is suggested  below..  3. Diverticulosis without definite diverticulitis  4. Additional findings similar to prior CT from 12/10/2024    International evidence-based [place] guidelines for the management of  intraductal papillary mucinous neoplasm of the pancreas:   Surveillance is recommended if no high risk stigmata or worrisome  features are present:  Primary imaging modalities recommended are MRI/MRCP and MDCT  Size of largest cyst:   *  Less than 20 mm: Follow-up imaging in 6 months once, then every 18  months if no change. Stop surveillance if stable for 5 years.  *  More than 20 mm but less than 30 mm: Follow-up imaging at 6 months,  12 months, then yearly if no change.   *  More than 30 mm- follow up imaging every 6 months.    GI consultation for surgery/endoscopic ultrasound is  "recommended for  cysts with \"high-risk stigmata\" of high grade dysplasia or invasive  carcinoma such as:  1. Obstructive jaundice in a patient with cystic lesion of the head of  the pancreas  2. Enhancing mural nodule > 5mm or solid component  3. Main pancreatic duct >10mm  4. Suspicious or positive results of cytology    If worrisome features are present, GI consultation is recommended.  Worrisome features on imagin. Cyst more than or equal to 30 mm  2. Thickened or enhancing cyst walls  3. Main pancreatic duct > 5mm and < 10mm.  4. Abrupt change in caliber of pancreatic duct with distal atrophy  5. Lymphadenopathy  6. Cyst growth rate > 2.5mm/year    *Reference: International evidence-based [place] guidelines for the  management of  intraductal papillary mucinous neoplasm of the pancreas Pancreatology:  24:(); 255-270.  https://doi.org/10.1016/j.mccracken.2023.12.009   [place] Draw     Status: None (In process)    Narrative    The following orders were created for panel order [place] Draw.  Procedure                               Abnormality         Status                     ---------                               -----------         ------                     Extra Red Top Tube[[id number]]                              Final result               Extra Green Top (Lithiu...[[id number]]                                                 Extra Purple Top Tube[[id number]]                           Final result               Extra Heparinized Syringe[[id number]]                       Final result                 Please view results for these tests on the individual orders.   Lactic Acid Whole Blood w/ 1x repeat in 2 hrs when >2     Status: Abnormal   Result Value Ref Range    Lactic Acid, Initial 2.2 (H) 0.7 - 2.0 mmol/L   Comprehensive metabolic panel     Status: Abnormal   Result Value Ref Range    Sodium 138 135 - 145 mmol/L    Potassium 4.4 3.4 - 5.3 mmol/L    Carbon Dioxide (CO2) 24 22 - 29 mmol/L    Anion Gap 11 7 " - 15 mmol/L    Urea Nitrogen 51.3 (H) 8.0 - 23.0 mg/dL    Creatinine 1.56 (H) 0.51 - 0.95 mg/dL    GFR Estimate 34 (L) >60 mL/min/1.73m2    Calcium 10.4 8.8 - 10.4 mg/dL    Chloride 103 98 - 107 mmol/L    Glucose 183 (H) 70 - 99 mg/dL    Alkaline Phosphatase 110 40 - 150 U/L    AST 58 (H) 0 - 45 U/L    ALT 61 (H) 0 - 50 U/L    Protein Total 6.5 6.4 - 8.3 g/dL    Albumin 3.5 3.5 - 5.2 g/dL    Bilirubin Total 0.5 <=1.2 mg/dL   Procalcitonin     Status: Normal   Result Value Ref Range    Procalcitonin 0.11 <0.50 ng/mL   UA with Microscopic reflex to Culture     Status: Abnormal    Specimen: Urine, Clean Catch   Result Value Ref Range    Color Urine Orange (A) Colorless, Straw, Light Yellow, Yellow    Appearance Urine Cloudy (A) Clear    Glucose Urine Negative Negative mg/dL    Bilirubin Urine Negative Negative    Ketones Urine Negative Negative mg/dL    Specific Gravity Urine 1.014 1.003 - 1.035    Blood Urine Small (A) Negative    pH Urine 6.0 5.0 - 7.0    Protein Albumin Urine 50 (A) Negative mg/dL    Urobilinogen Urine Normal Normal, 2.0 mg/dL    Nitrite Urine Positive (A) Negative    Leukocyte Esterase Urine Large (A) Negative    Bacteria Urine Moderate (A) None Seen /HPF    WBC Clumps Urine Present (A) None Seen /HPF    RBC Urine 13 (H) <=2 /HPF    WBC Urine >182 (H) <=5 /HPF    Squamous Epithelials Urine 6 (H) <=1 /HPF    Narrative    Urine Culture ordered based on laboratory criteria   Influenza A/B, RSV and SARS-CoV2 PCR (COVID-19) Nasopharyngeal     Status: Normal    Specimen: Nasopharyngeal; Swab   Result Value Ref Range    Influenza A PCR Negative Negative    Influenza B PCR Negative Negative    RSV PCR Negative Negative    SARS CoV2 PCR Negative Negative    Narrative    Testing was performed using the Xpert Xpress CoV2/Flu/RSV Assay on the [employer] Instrument. This test should be ordered for the detection of SARS-CoV2, influenza, and RSV viruses in individuals with signs and symptoms of respiratory  tract infection. This test is for in vitro diagnostic use under the [place] FDA for laboratories certified under CLIA to perform high or moderate complexity testing. This test has been [place] FDA cleared. A negative result does not rule out the presence of PCR inhibitors in the specimen or target RNA in concentration below the limit of detection for the assay. If only one viral target is positive but coinfection with multiple targets is suspected, the sample should be re-tested with another FDA cleared, approved, or authorized test, if coninfection would change clinical management. This test was validated by the [facility]. These laboratories are certified under the Clinical Laboratory Improvement Amendments of 1988 (CLIA-88) as qualified to perfom high complexity laboratory testing.   Troponin T, High Sensitivity     Status: Abnormal   Result Value Ref Range    Troponin T, High Sensitivity 29 (H) <=14 ng/L   Nt probnp inpatient     Status: Abnormal   Result Value Ref Range    N terminal Pro BNP Inpatient 963 (H) 0 - 900 pg/mL   Extra Red Top Tube     Status: None   Result Value Ref Range    Hold Specimen [facility]    Extra Purple Top Tube     Status: None   Result Value Ref Range    Hold Specimen JIC    Extra Heparinized Syringe     Status: None   Result Value Ref Range    Hold Specimen JIC    CBC with platelets and differential     Status: Abnormal   Result Value Ref Range    WBC Count 18.9 (H) 4.0 - 11.0 10e3/uL    RBC Count 4.00 3.80 - 5.20 10e6/uL    Hemoglobin 10.8 (L) 11.7 - 15.7 g/dL    Hematocrit 34.2 (L) 35.0 - 47.0 %    MCV 86 78 - 100 fL    MCH 27.0 26.5 - 33.0 pg    MCHC 31.6 31.5 - 36.5 g/dL    RDW 17.1 (H) 10.0 - 15.0 %    Platelet Count 346 150 - 450 10e3/uL    % Neutrophils 87 %    % Lymphocytes 5 %    % Monocytes 7 %    % Eosinophils 0 %    % Basophils 0 %    % Immature Granulocytes 1 %    NRBCs per 100 WBC 0 <1 /100    Absolute Neutrophils 16.4 (H) 1.6 - 8.3 10e3/uL    Absolute Lymphocytes  0.9 0.8 - 5.3 10e3/uL    Absolute Monocytes 1.3 0.0 - 1.3 10e3/uL    Absolute Eosinophils 0.0 0.0 - 0.7 10e3/uL    Absolute Basophils 0.0 0.0 - 0.2 10e3/uL    Absolute Immature Granulocytes 0.3 <=0.4 10e3/uL    Absolute NRBCs 0.0 10e3/uL   Lactic acid whole blood     Status: Normal   Result Value Ref Range    Lactic Acid 1.0 0.7 - 2.0 mmol/L   Troponin T, High Sensitivity     Status: Abnormal   Result Value Ref Range    Troponin T, High Sensitivity 30 (H) <=14 ng/L   Extra Tube     Status: None    Narrative    The following orders were created for panel order Extra Tube.  Procedure                               Abnormality         Status                     ---------                               -----------         ------                     Extra Blue Top Tube[[id number]]                             Final result                 Please view results for these tests on the individual orders.   Extra Blue Top Tube     Status: None   Result Value Ref Range    Hold Specimen [facility]    EKG 12 lead     Status: None (Preliminary result)   Result Value Ref Range    Systolic Blood Pressure  mmHg    Diastolic Blood Pressure  mmHg    Ventricular Rate 90 BPM    Atrial Rate 90 BPM    VT Interval 190 ms    QRS Duration 76 ms     ms    QTc 430 ms    P Axis 66 degrees    R AXIS 3 degrees    T Axis 34 degrees    Interpretation ECG Sinus rhythm  Normal ECG      Respiratory Panel PCR     Status: Normal    Specimen: Nasopharyngeal; Swab   Result Value Ref Range    Adenovirus Not Detected Not Detected    Coronavirus Not Detected Not Detected    Human Metapneumovirus Not Detected Not Detected    Human Rhin/Enterovirus Not Detected Not Detected    Influenza A Not Detected Not Detected    Influenza A, H1 Not Detected Not Detected    Influenza A 2009 H1N1 Not Detected Not Detected    Influenza A, H3 Not Detected Not Detected    Influenza B Not Detected Not Detected    Parainfluenza Virus 1 Not Detected Not Detected    Parainfluenza Virus  2 Not Detected Not Detected    Parainfluenza Virus 3 Not Detected Not Detected    Parainfluenza Virus 4 Not Detected Not Detected    Respiratory Syncytial Virus A Not Detected Not Detected    Respiratory Syncytial Virus B Not Detected Not Detected    Chlamydia Pneumoniae Not Detected Not Detected    Mycoplasma Pneumoniae Not Detected Not Detected    Narrative    The [employer] Respiratory Panel is a qualitative nucleic acid, multiplex, in vitro diagnostic test for the simultaneous detection and identification of multiple respiratory viral and bacterial nucleic acids in nasopharyngeal swabs collected in viral transport media from individual exhibiting signs and symptoms of respiratory infection. The assay has received FDA approval for the testing of nasopharyngeal (NP) swabs only. This test is used for clinical purposes and should not be regarded as investigational or for research. This laboratory is certified under the Clinical Laboratory Improvement Amendments of 1988 (CLIA-88) as qualified to perform high complexity clinical laboratory testing.   CBC with platelets differential     Status: Abnormal    Narrative    The following orders were created for panel order CBC with platelets differential.  Procedure                               Abnormality         Status                     ---------                               -----------         ------                     CBC with platelets and ...[[id number]]  Abnormal            Final result                 Please view results for these tests on the individual orders.     Medications   iopamidol (ISOVUE-370) solution 104 mL (has no administration in time range)   sodium chloride (PF) 0.9% PF flush 90 mL (has no administration in time range)   sodium chloride (PF) 0.9% PF flush 76 mL (has no administration in time range)   vancomycin (VANCOCIN) 1,750 mg in sodium chloride 0.9 % 567.5 mL intermittent infusion (1,750 mg Intravenous $Given 3/8/25 1931)   lidocaine 1 % 0.1-1  mL (has no administration in time range)   lidocaine (LMX4) cream (has no administration in time range)   sodium chloride (PF) 0.9% PF flush 3 mL (has no administration in time range)   sodium chloride (PF) 0.9% PF flush 3 mL (has no administration in time range)   senna-docusate (SENOKOT-S/PERICOLACE) 8.6-50 MG per tablet 1 tablet (has no administration in time range)     Or   senna-docusate (SENOKOT-S/PERICOLACE) 8.6-50 MG per tablet 2 tablet (has no administration in time range)   Patient is already receiving anticoagulation with heparin, enoxaparin (LOVENOX), warfarin (COUMADIN)  or other anticoagulant medication (has no administration in time range)   acetaminophen (TYLENOL) tablet 650 mg (has no administration in time range)     Or   acetaminophen (TYLENOL) Suppository 650 mg (has no administration in time range)   meropenem (MERREM) 1 g vial to attach to  mL bag (has no administration in time range)   amLODIPine (NORVASC) tablet 5 mg (has no administration in time range)   apixaban ANTICOAGULANT (ELIQUIS) tablet 5 mg (has no administration in time range)   calcitRIOL (ROCALTROL) capsule 0.25 mcg (has no administration in time range)   ezetimibe (ZETIA) tablet 10 mg (has no administration in time range)   folic acid (FOLVITE) tablet 1,000 mcg (has no administration in time range)   gabapentin (NEURONTIN) solution 300 mg (has no administration in time range)   hydrALAZINE (APRESOLINE) tablet 25 mg (has no administration in time range)   ketoconazole (NIZORAL) 2 % cream (has no administration in time range)   ketoconazole (NIZORAL) 2 % shampoo (has no administration in time range)   levothyroxine (SYNTHROID/LEVOTHROID) tablet 175 mcg (has no administration in time range)   linagliptin (TRADJENTA) tablet 5 mg (has no administration in time range)   meclizine (ANTIVERT) tablet 25 mg (has no administration in time range)   metoprolol succinate ER (TOPROL XL) 24 hr tablet 25 mg (has no administration in time  range)   PreserVision AREDS 2 CAPS 1 capsule (has no administration in time range)   predniSONE (DELTASONE) tablet 10 mg (has no administration in time range)   sirolimus (GENERIC EQUIVALENT) tablet 1 mg (has no administration in time range)   ursodiol (ACTIGALL) tablet 250 mg (has no administration in time range)   ondansetron (ZOFRAN ODT) ODT tab 4 mg (has no administration in time range)   sodium chloride 0.9% BOLUS 500 mL (0 mLs Intravenous Stopped 3/8/25 1922)   meropenem (MERREM) 1 g vial to attach to  mL bag (1 g Intravenous $New Bag 3/8/25 1812)   sodium chloride 0.9% BOLUS 500 mL (500 mLs Intravenous $New Bag 3/8/25 1932)     Labs Ordered and Resulted from Time of ED Arrival to Time of ED Departure   LACTIC ACID WHOLE BLOOD WITH 1X REPEAT IN 2 HR WHEN >2 - Abnormal       Result Value    Lactic Acid, Initial 2.2 (*)    COMPREHENSIVE METABOLIC PANEL - Abnormal    Sodium 138      Potassium 4.4      Carbon Dioxide (CO2) 24      Anion Gap 11      Urea Nitrogen 51.3 (*)     Creatinine 1.56 (*)     GFR Estimate 34 (*)     Calcium 10.4      Chloride 103      Glucose 183 (*)     Alkaline Phosphatase 110      AST 58 (*)     ALT 61 (*)     Protein Total 6.5      Albumin 3.5      Bilirubin Total 0.5     ROUTINE UA WITH MICROSCOPIC REFLEX TO CULTURE - Abnormal    Color Urine Orange (*)     Appearance Urine Cloudy (*)     Glucose Urine Negative      Bilirubin Urine Negative      Ketones Urine Negative      Specific Gravity Urine 1.014      Blood Urine Small (*)     pH Urine 6.0      Protein Albumin Urine 50 (*)     Urobilinogen Urine Normal      Nitrite Urine Positive (*)     Leukocyte Esterase Urine Large (*)     Bacteria Urine Moderate (*)     WBC Clumps Urine Present (*)     RBC Urine 13 (*)     WBC Urine >182 (*)     Squamous Epithelials Urine 6 (*)    TROPONIN T, HIGH SENSITIVITY - Abnormal    Troponin T, High Sensitivity 29 (*)    NT PROBNP INPATIENT - Abnormal    N terminal Pro BNP Inpatient 963 (*)    CBC  WITH PLATELETS AND DIFFERENTIAL - Abnormal    WBC Count 18.9 (*)     RBC Count 4.00      Hemoglobin 10.8 (*)     Hematocrit 34.2 (*)     MCV 86      MCH 27.0      MCHC 31.6      RDW 17.1 (*)     Platelet Count 346      % Neutrophils 87      % Lymphocytes 5      % Monocytes 7      % Eosinophils 0      % Basophils 0      % Immature Granulocytes 1      NRBCs per 100 WBC 0      Absolute Neutrophils 16.4 (*)     Absolute Lymphocytes 0.9      Absolute Monocytes 1.3      Absolute Eosinophils 0.0      Absolute Basophils 0.0      Absolute Immature Granulocytes 0.3      Absolute NRBCs 0.0     TROPONIN T, HIGH SENSITIVITY - Abnormal    Troponin T, High Sensitivity 30 (*)    PROCALCITONIN - Normal    Procalcitonin 0.11     INFLUENZA A/B, RSV AND SARS-COV2 PCR - Normal    Influenza A PCR Negative      Influenza B PCR Negative      RSV PCR Negative      SARS CoV2 PCR Negative     LACTIC ACID WHOLE BLOOD - Normal    Lactic Acid 1.0     RESPIRATORY PANEL PCR - Normal    Adenovirus Not Detected      Coronavirus Not Detected      Human Metapneumovirus Not Detected      Human Rhin/Enterovirus Not Detected      Influenza A Not Detected      Influenza A, H1 Not Detected      Influenza A 2009 H1N1 Not Detected      Influenza A, H3 Not Detected      Influenza B Not Detected      Parainfluenza Virus 1 Not Detected      Parainfluenza Virus 2 Not Detected      Parainfluenza Virus 3 Not Detected      Parainfluenza Virus 4 Not Detected      Respiratory Syncytial Virus A Not Detected      Respiratory Syncytial Virus B Not Detected      Chlamydia Pneumoniae Not Detected      Mycoplasma Pneumoniae Not Detected     BLOOD CULTURE   BLOOD CULTURE   MRSA MSSA PCR, NASAL SWAB   URINE CULTURE     CT Abdomen Pelvis w/o Contrast   Preliminary Result   IMPRESSION:          1. Postsurgical changes of liver transplant, subtle curvilinear   hypodensity in the inferior liver (4/42 and 3/67 may represent   peripheral biliary ductal dilatation or edema, similar  "to slightly   more conspicuous compared to prior CT scan; if persistent clinical   concern for cholangitis, consider gastroenterology consult and/or   MRCP.   2. Fluid density subcentimeter lesion in the pancreatic head region,   may represent pancreatic cysts/IPMN, consider follow-up is suggested   below..   3. Diverticulosis without definite diverticulitis   4. Additional findings similar to prior CT from 12/10/2024      International evidence-based [place] guidelines for the management of   intraductal papillary mucinous neoplasm of the pancreas:    Surveillance is recommended if no high risk stigmata or worrisome   features are present:   Primary imaging modalities recommended are MRI/MRCP and MDCT   Size of largest cyst:    *  Less than 20 mm: Follow-up imaging in 6 months once, then every 18   months if no change. Stop surveillance if stable for 5 years.   *  More than 20 mm but less than 30 mm: Follow-up imaging at 6 months,   12 months, then yearly if no change.    *  More than 30 mm- follow up imaging every 6 months.      GI consultation for surgery/endoscopic ultrasound is recommended for   cysts with \"high-risk stigmata\" of high grade dysplasia or invasive   carcinoma such as:   1. Obstructive jaundice in a patient with cystic lesion of the head of   the pancreas   2. Enhancing mural nodule > 5mm or solid component   3. Main pancreatic duct >10mm   4. Suspicious or positive results of cytology      If worrisome features are present, GI consultation is recommended.   Worrisome features on imagin. Cyst more than or equal to 30 mm   2. Thickened or enhancing cyst walls   3. Main pancreatic duct > 5mm and < 10mm.   4. Abrupt change in caliber of pancreatic duct with distal atrophy   5. Lymphadenopathy   6. Cyst growth rate > 2.5mm/year      *Reference: International evidence-based Kyoto guidelines for the   management of   intraductal papillary mucinous neoplasm of the pancreas Pancreatology:   24:(); " 930-270.   https://doi.org/10.1016/j.mccracken.2023.12.009      XR Chest 1 View   Final Result   Impression: Streaky right basilar opacities, may represent   atelectasis, scarring or infiltrate in the appropriate clinical   settings.      I have personally reviewed the examination and initial interpretation   and I agree with the findings.      ROSENDO HIRSCH MD            SYSTEM ID:  [id number]             Critical Care Addendum  My initial assessment, based on my review of vital signs and interpretation of CBC, lactic acid , established a high suspicion that Luz Thompson has sepsis with indication for early goal-directed therapy, which requires immediate intervention, and therefore she is critically ill.     After the initial assessment, the care team initiated multiple lab tests, initiated IV fluid administration, and initiated medication therapy with IV fluids, broad-spectrum antibiotics  to provide stabilization care. Due to the critical nature of this patient, I reassessed nursing observations, physical exam, neurologic status, and respiratory status multiple times prior to her disposition.     Time also spent performing reviewing test results, discussion with consultants, and coordination of care.     Critical care time (excluding teaching time and procedures): 30 minutes.       Assessment & Plan    This is a medically complex 74-year-old female present with concerns for new onset fever.  She is febrile in the emergency department 100.6 without tachycardia or hypoxia.  On exam she has soft abdomen clear lungs no obvious skin changes suggestive of cellulitis.  She reports some mild dyspnea on exertion which she states she attributes to being dehydrated.  Reviewed her extensive medical history including history of resistant infections and numerous antibiotic allergies.  Plan for infectious workup including blood cultures lactic acid CBC chemistry will [place] chest x-ray evaluating possible infiltrate as well as  a further evaluate patient's dyspnea on exertion, EKG troponin BNP also initiated evaluate possibility of CHF, ACS.  UA initiated as well as COVID swab and full PCR respiratory swab.    Initial EKG without findings for ischemia or arrhythmia.  Lactic acid elevated 2.2 did clear after fluids down to 1.0.  CBC remarkable for significant leukocytosis 18.9.  Chemistry shows creatinine 1.56 per chart review similar to baseline.  No clinically significant electrolyte disturbances.  Patient has mild transaminitisALT 61 AST 56.  Respiratory swab negative.  UA shows infection with positive nitrites large leukocyte esterase greater than 182 WBCs and clumps of white cells.  Chest x-ray clear.  Patient given broad-spectrum antibiotics vancomycin and meropenem per recommendations from the ED pharmacy team.  CT noncontrast abdomen also initiated evaluating possible cholangitis with a mild transaminitis in the setting of prior liver transplant I had a discussion with patient about contrast she is very hesitant for IV contrast noting a history of prior kidney injury from contrast.  Patient be admitted to the medicine team for further management of her UTI.    Patient seen and discussed with attending physician , who agrees with my plan of care.    After patient had been admitted, the CT over read had returned showing postsurgical changes of prior liver transplant with edema noted of the liver patient which has been noted in prior imaging studies however in the right clinical context could be seen with cholangitis.  This was passed on to the medicine team.  Clinically lower suspicion without abdominal pain and with the more likely source of infection, UA.    I have reviewed the nursing notes. I have reviewed the findings, diagnosis, plan and need for follow up with the patient.    New Prescriptions    No medications on file       Final diagnoses:   Sepsis secondary to UTI (H)       Verona Harden [facility]  [facility] EMERGENCY DEPARTMENT  3/8/2025     Verona Harden PA-C  03/08/25 1904       Verona Harden PA-C  03/08/25 2046    --    ED Attending Physician Attestation    I Ruel Causey MD, MD, cared for this patient with the Advanced Practice Provider (DOMI). I personally provided a substantive portion of the care for this patient, including approving the care plan for the number and complexity of problems addressed and taking responsibility related to the risk of complications and/or morbidity or mortality of patient management. Please see the DOMI's documentation for full details.    Summary of HPI, PE, ED Course   Patient is a 74 year old female evaluated in the emergency department for s/p liver transplant here with fever. Exam and ED course notable for ok BP but ildly elevated lactic acid, WBC 18 but also mildly elevated LFTs-CT, UA consistent with UTI, cx'ed and started meropenem and vanco for sepsis. After the completion of care in the emergency department, the patient was admitted to inpatient.      Ruel Causey MD, MD  Emergency Medicine       Ruel Causey MD  03/09/25 1247

## 2025-03-08 NOTE — PLAN OF CARE
Brief transplant infectious disease note:    Alerted by our general infectious disease team that a consult was placed for this patient.  Patient known to transplant ID service ( and myself) from her recent observation admission.     Agree with empirical meropenem for possibility of ESBL urosepsis.    Transplant ID will plan formally see this patient tomorrow pending culture incubation, labs, currently ordered imaging.    Do not hesitate to reach out to the transplant infectious disease service with any questions in the interim.    Signed:  Augustin Kam MD , Available on [employer]  Staff Physician, Transplant and General Infectious Diseases   For On Call and Coverage info see [facility]-> Infectious Disease Medicine Adult/[facility]

## 2025-03-08 NOTE — PHARMACY-VANCOMYCIN DOSING SERVICE
Pharmacy Vancomycin Initial Note  Date of Service 2025  Patient's  1950  74 year old, female    Indication: Sepsis    Current estimated CrCl = Estimated Creatinine Clearance: 33.7 mL/min (A) (based on SCr of 1.56 mg/dL (H)).    Creatinine for last 3 days  3/8/2025:  4:06 PM Creatinine 1.56 mg/dL    Recent Vancomycin Level(s) for last 3 days  No results found for requested labs within last 3 days.      Vancomycin IV Administrations (past 72 hours)        No vancomycin orders with administrations in past 72 hours.                    Nephrotoxins and other renal medications (From now, onward)      Start     Dose/Rate Route Frequency Ordered Stop    25 1900  vancomycin (VANCOCIN) 1,000 mg in 200 mL dextrose intermittent infusion         1,000 mg  200 mL/hr over 1 Hours Intravenous EVERY 24 HOURS 25 1754      25 175  vancomycin (VANCOCIN) 1,750 mg in sodium chloride 0.9 % 567.5 mL intermittent infusion         1,750 mg  over 2 Hours Intravenous ONCE 25 175              Contrast Orders - past 72 hours (72h ago, onward)      Start     Dose/Rate Route Frequency Stop    25 1750  iopamidol (ISOVUE-370) solution 104 mL         104 mL Intravenous ONCE              Insight[employer] Prediction of Planned Initial Vancomycin Regimen  Loading dose: 1750 mg at 18:00 2025.  Regimen: 1000 mg IV every 24 hours.  Start time: 07:00 on 2025  Exposure target: AUC24 (range)400-600 mg/L.hr   AUC24,ss: 544 mg/L.hr  Probability of AUC24 > 400: 82 %  Ctrough,ss: 17.8 mg/L  Probability of Ctrough,ss > 20: 39 %  Probability of nephrotoxicity (Lodise VERA ): 14 %        Plan:  Start vancomycin 1750 mg IV once followed by 1000 mg IV q24h.   Vancomycin monitoring method: AUC  Vancomycin therapeutic monitoring goal: 400-600 mg*h/L  Pharmacy will check vancomycin levels as appropriate in 1-3 Days.    Serum creatinine levels will be ordered daily for the first week of therapy and at least twice  weekly for subsequent weeks.      Ezra Love RPH

## 2025-03-08 NOTE — TELEPHONE ENCOUNTER
Ms Johnson called and because she has had a new fever.  Fever was present when she woke up this morning at about 6 AM.  Went back to sleep bundled up but still febrile.  She took a gram of acetaminophen 2 hours prior to call and still with a fever to 102.  So far she has reasonable blood pressures 144/66 at last check with a heart rate of 90.  She does not have any other particular symptoms.  She notes her history of UTIs and that she sees Dr. Lundberg for this reason.  She has standing orders for urinalysis and culture and has fosfomycin however is calling because of the high-grade of her fever.  She does have a history of liver transplant in 2002 she is on sirolimus prednisone 10 mg daily as well as ursodiol.    I directed her to come to the emergency department.  [place] prefers to come to the [facility] due to transplant teams being here.  She is going to talk to her son who will bring her in.  I will give the emergency department a call to let them know and also will let her transplant ID team know.      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  She also has a history of numerous antimicrobial toxicities and sensitivities.

## 2025-03-08 NOTE — ED TRIAGE NOTES
BIBA from home due having fever for one day . Running 102. Hx: Skin Cancer getting removed from face next month . She is here for eval. Denied SOB, Chest pain.  BP (!) 146/67   Pulse 91   Temp 100.6  F (38.1  C) (Oral)   Resp 16   LMP 06/01/1988 (Approximate)   SpO2 100%

## 2025-03-09 ENCOUNTER — APPOINTMENT (OUTPATIENT)
Dept: OCCUPATIONAL THERAPY | Facility: CLINIC | Age: 75
DRG: 871 | End: 2025-03-09
Attending: PHYSICIAN ASSISTANT
Payer: MEDICARE

## 2025-03-09 LAB
ALBUMIN SERPL BCG-MCNC: 3.2 G/DL (ref 3.5–5.2)
ALP SERPL-CCNC: 102 U/L (ref 40–150)
ALT SERPL W P-5'-P-CCNC: 84 U/L (ref 0–50)
ANION GAP SERPL CALCULATED.3IONS-SCNC: 13 MMOL/L (ref 7–15)
AST SERPL W P-5'-P-CCNC: 46 U/L (ref 0–45)
BILIRUB SERPL-MCNC: 0.4 MG/DL
BUN SERPL-MCNC: 38.5 MG/DL (ref 8–23)
CALCIUM SERPL-MCNC: 8.9 MG/DL (ref 8.8–10.4)
CHLORIDE SERPL-SCNC: 108 MMOL/L (ref 98–107)
CREAT SERPL-MCNC: 1.34 MG/DL (ref 0.51–0.95)
EGFRCR SERPLBLD CKD-EPI 2021: 41 ML/MIN/1.73M2
ENTEROCOCCUS FAECALIS: DETECTED
ENTEROCOCCUS FAECIUM: NOT DETECTED
ERYTHROCYTE [DISTWIDTH] IN BLOOD BY AUTOMATED COUNT: 17.2 % (ref 10–15)
GLUCOSE BLDC GLUCOMTR-MCNC: 217 MG/DL (ref 70–99)
GLUCOSE SERPL-MCNC: 136 MG/DL (ref 70–99)
HCO3 SERPL-SCNC: 20 MMOL/L (ref 22–29)
HCT VFR BLD AUTO: 34.2 % (ref 35–47)
HGB BLD-MCNC: 10.5 G/DL (ref 11.7–15.7)
LISTERIA SPECIES (DETECTED/NOT DETECTED): NOT DETECTED
MAGNESIUM SERPL-MCNC: 4 MG/DL (ref 1.7–2.3)
MCH RBC QN AUTO: 27.2 PG (ref 26.5–33)
MCHC RBC AUTO-ENTMCNC: 30.7 G/DL (ref 31.5–36.5)
MCV RBC AUTO: 89 FL (ref 78–100)
PHOSPHATE SERPL-MCNC: 2.8 MG/DL (ref 2.5–4.5)
PLATELET # BLD AUTO: 323 10E3/UL (ref 150–450)
POTASSIUM SERPL-SCNC: 4 MMOL/L (ref 3.4–5.3)
PROT SERPL-MCNC: 6.1 G/DL (ref 6.4–8.3)
RBC # BLD AUTO: 3.86 10E6/UL (ref 3.8–5.2)
SODIUM SERPL-SCNC: 141 MMOL/L (ref 135–145)
STAPHYLOCOCCUS AUREUS: NOT DETECTED
STAPHYLOCOCCUS EPIDERMIDIS: NOT DETECTED
STAPHYLOCOCCUS LUGDUNENSIS: NOT DETECTED
STAPHYLOCOCCUS SPECIES: NOT DETECTED
STREPTOCOCCUS AGALACTIAE: NOT DETECTED
STREPTOCOCCUS ANGINOSUS GROUP: NOT DETECTED
STREPTOCOCCUS PNEUMONIAE: NOT DETECTED
STREPTOCOCCUS PYOGENES: NOT DETECTED
STREPTOCOCCUS SPECIES: NOT DETECTED
WBC # BLD AUTO: 15.1 10E3/UL (ref 4–11)

## 2025-03-09 PROCEDURE — 36415 COLL VENOUS BLD VENIPUNCTURE: CPT | Performed by: STUDENT IN AN ORGANIZED HEALTH CARE EDUCATION/TRAINING PROGRAM

## 2025-03-09 PROCEDURE — 97165 OT EVAL LOW COMPLEX 30 MIN: CPT | Mod: GO

## 2025-03-09 PROCEDURE — 82962 GLUCOSE BLOOD TEST: CPT

## 2025-03-09 PROCEDURE — 97535 SELF CARE MNGMENT TRAINING: CPT | Mod: GO

## 2025-03-09 PROCEDURE — 250N000013 HC RX MED GY IP 250 OP 250 PS 637: Performed by: STUDENT IN AN ORGANIZED HEALTH CARE EDUCATION/TRAINING PROGRAM

## 2025-03-09 PROCEDURE — G0545 PR INHRENT VISIT TO INPT/OBS W CNFRM/SUSPCT INFCT DIS BY INFCT DIS SPCIALST: HCPCS | Performed by: STUDENT IN AN ORGANIZED HEALTH CARE EDUCATION/TRAINING PROGRAM

## 2025-03-09 PROCEDURE — 87040 BLOOD CULTURE FOR BACTERIA: CPT | Performed by: PHYSICIAN ASSISTANT

## 2025-03-09 PROCEDURE — 250N000011 HC RX IP 250 OP 636: Performed by: STUDENT IN AN ORGANIZED HEALTH CARE EDUCATION/TRAINING PROGRAM

## 2025-03-09 PROCEDURE — 84100 ASSAY OF PHOSPHORUS: CPT | Performed by: PHYSICIAN ASSISTANT

## 2025-03-09 PROCEDURE — 250N000012 HC RX MED GY IP 250 OP 636 PS 637: Performed by: STUDENT IN AN ORGANIZED HEALTH CARE EDUCATION/TRAINING PROGRAM

## 2025-03-09 PROCEDURE — 99222 1ST HOSP IP/OBS MODERATE 55: CPT | Mod: GC | Performed by: STUDENT IN AN ORGANIZED HEALTH CARE EDUCATION/TRAINING PROGRAM

## 2025-03-09 PROCEDURE — 85018 HEMOGLOBIN: CPT | Performed by: STUDENT IN AN ORGANIZED HEALTH CARE EDUCATION/TRAINING PROGRAM

## 2025-03-09 PROCEDURE — 83735 ASSAY OF MAGNESIUM: CPT | Performed by: PHYSICIAN ASSISTANT

## 2025-03-09 PROCEDURE — 120N000002 HC R&B MED SURG/OB UMMC

## 2025-03-09 PROCEDURE — 250N000013 HC RX MED GY IP 250 OP 250 PS 637: Performed by: PHYSICIAN ASSISTANT

## 2025-03-09 PROCEDURE — 36415 COLL VENOUS BLD VENIPUNCTURE: CPT | Performed by: PHYSICIAN ASSISTANT

## 2025-03-09 PROCEDURE — 99207 PR APP CREDIT; MD BILLING SHARED VISIT: CPT | Mod: FS | Performed by: PHYSICIAN ASSISTANT

## 2025-03-09 PROCEDURE — 250N000011 HC RX IP 250 OP 636: Performed by: HOSPITALIST

## 2025-03-09 PROCEDURE — 85041 AUTOMATED RBC COUNT: CPT | Performed by: STUDENT IN AN ORGANIZED HEALTH CARE EDUCATION/TRAINING PROGRAM

## 2025-03-09 PROCEDURE — 80053 COMPREHEN METABOLIC PANEL: CPT | Performed by: STUDENT IN AN ORGANIZED HEALTH CARE EDUCATION/TRAINING PROGRAM

## 2025-03-09 PROCEDURE — 99223 1ST HOSP IP/OBS HIGH 75: CPT | Performed by: STUDENT IN AN ORGANIZED HEALTH CARE EDUCATION/TRAINING PROGRAM

## 2025-03-09 PROCEDURE — 99233 SBSQ HOSP IP/OBS HIGH 50: CPT | Mod: FS | Performed by: HOSPITALIST

## 2025-03-09 RX ORDER — ERTAPENEM 1 G/1
1 INJECTION, POWDER, LYOPHILIZED, FOR SOLUTION INTRAMUSCULAR; INTRAVENOUS EVERY 24 HOURS
Status: DISCONTINUED | OUTPATIENT
Start: 2025-03-10 | End: 2025-03-14 | Stop reason: HOSPADM

## 2025-03-09 RX ORDER — VANCOMYCIN HYDROCHLORIDE 1 G/200ML
1000 INJECTION, SOLUTION INTRAVENOUS EVERY 24 HOURS
Status: DISCONTINUED | OUTPATIENT
Start: 2025-03-09 | End: 2025-03-10

## 2025-03-09 RX ORDER — OMEGA-3-ACID ETHYL ESTERS 1 G/1
1 CAPSULE, LIQUID FILLED ORAL 2 TIMES DAILY
Status: DISCONTINUED | OUTPATIENT
Start: 2025-03-09 | End: 2025-03-14 | Stop reason: HOSPADM

## 2025-03-09 RX ADMIN — OMEGA-3-ACID ETHYL ESTERS 1 G: 1 CAPSULE, LIQUID FILLED ORAL at 15:37

## 2025-03-09 RX ADMIN — FOLIC ACID 1000 MCG: 1 TABLET ORAL at 08:50

## 2025-03-09 RX ADMIN — CALCITRIOL CAPSULES 0.25 MCG 0.25 MCG: 0.25 CAPSULE ORAL at 08:50

## 2025-03-09 RX ADMIN — LINAGLIPTIN 5 MG: 5 TABLET, FILM COATED ORAL at 08:50

## 2025-03-09 RX ADMIN — MEROPENEM 1 G: 1 INJECTION, POWDER, FOR SOLUTION INTRAVENOUS at 06:19

## 2025-03-09 RX ADMIN — PREDNISONE 10 MG: 5 TABLET ORAL at 08:49

## 2025-03-09 RX ADMIN — MEROPENEM 1 G: 1 INJECTION, POWDER, FOR SOLUTION INTRAVENOUS at 18:01

## 2025-03-09 RX ADMIN — OMEGA-3-ACID ETHYL ESTERS 1 G: 1 CAPSULE, LIQUID FILLED ORAL at 20:27

## 2025-03-09 RX ADMIN — APIXABAN 5 MG: 5 TABLET, FILM COATED ORAL at 20:27

## 2025-03-09 RX ADMIN — VANCOMYCIN HYDROCHLORIDE 1000 MG: 1 INJECTION, SOLUTION INTRAVENOUS at 18:38

## 2025-03-09 RX ADMIN — GABAPENTIN 300 MG: 250 SOLUTION ORAL at 22:55

## 2025-03-09 RX ADMIN — EZETIMIBE 10 MG: 10 TABLET ORAL at 08:50

## 2025-03-09 RX ADMIN — SIROLIMUS 1 MG: 1 TABLET, FILM COATED ORAL at 08:50

## 2025-03-09 RX ADMIN — APIXABAN 5 MG: 5 TABLET, FILM COATED ORAL at 08:50

## 2025-03-09 RX ADMIN — LEVOTHYROXINE SODIUM 175 MCG: 0.17 TABLET ORAL at 08:51

## 2025-03-09 RX ADMIN — URSODIOL 250 MG: 250 TABLET ORAL at 08:50

## 2025-03-09 RX ADMIN — URSODIOL 250 MG: 250 TABLET ORAL at 20:27

## 2025-03-09 ASSESSMENT — ACTIVITIES OF DAILY LIVING (ADL)
ADLS_ACUITY_SCORE: 60
DEPENDENT_IADLS:: CLEANING;COOKING;LAUNDRY
ADLS_ACUITY_SCORE: 60

## 2025-03-09 NOTE — PROGRESS NOTES
03/09/25 1541   Appointment Info   Signing Clinician's Name / Credentials (OT) Nila Pereyra, OTR/L   Living Environment   People in Home alone   Current Living Arrangements independent living facility   Home Accessibility no concerns   Transportation Anticipated family or friend will provide   Living Environment Comments Pt reports living in ILF. Walks down daily to dining room for meals.   Self-Care   Usual Activity Tolerance moderate   Current Activity Tolerance moderate   Regular Exercise Other (see comments)   Equipment Currently Used at Home cane, straight;grab bar, toilet;grab bar, tub/shower;walker, rolling;other (see comments)  (toilet seat riser, lightweight walker)   Fall history within last six months no   Activity/Exercise/Self-Care Comment Pt reports Mod I with use of 4WW at baseline. Also has SEC present at time of eval which pt reports she does not like to use 2/2 shoudler pain.   Instrumental Activities of Daily Living (IADL)   Previous Responsibilities medication management;shopping;driving   IADL Comments Pt reports sravan assists with cleaning apartment 2x/week, receives meals from ILF.   General Information   Onset of Illness/Injury or Date of Surgery 03/08/25   Referring Physician Ashley Tamez PA-C   Patient/Family Therapy Goal Statement (OT) To go home   Additional Occupational Profile Info/Pertinent History of Current Problem Per chart:  74 year old female admitted on 3/8/2025. She medical history is significant for primary biliary cirrhosis s/p liver transplant (2002) (on sirolimus, prednisone 10 mg daily and ursodiol), paroxysmal atrial fibrillation on Eliquis, history of CVA, CKD IIIb, diabetes mellitus type 2 not on insulin, hypothyroidism, hypertension, and PAD.  She presented to the ED on 3/8 due to chills and measuring a fever of 102 deg F at home. She was admitted on 3/8 due to concern for sepsis related to urinary source.   Existing Precautions/Restrictions no  known precautions/restrictions   Limitations/Impairments hearing   Left Upper Extremity (Weight-bearing Status) full weight-bearing (FWB)   Right Upper Extremity (Weight-bearing Status) full weight-bearing (FWB)   Left Lower Extremity (Weight-bearing Status) full weight-bearing (FWB)   Right Lower Extremity (Weight-bearing Status) full weight-bearing (FWB)   General Observations and Info Activity Up ad ashley   Cognitive Status Examination   Orientation Status orientation to person, place and time   Cognitive Status Comments Pt appears grossly cognitively intact througohut obs/interview.   Visual Perception   Visual Impairment/Limitations WFL   Sensory   Sensory Quick Adds sensation intact   Pain Assessment   Patient Currently in Pain No   Posture   Posture protracted shoulders   Range of Motion Comprehensive   General Range of Motion upper extremity range of motion deficits identified   Comment, General Range of Motion AROM shoulder flex/abd only to 90, reports previous rotator cuff tear 2-3 years ago   Strength Comprehensive (MMT)   Comment, General Manual Muscle Testing (MMT) Assessment Not formally assessed, appears WFL for ADLs. Pt reports generalized deconditioning.   Muscle Tone Assessment   Muscle Tone Quick Adds No deficits were identified   Coordination   Upper Extremity Coordination No deficits were identified   Bed Mobility   Comment (Bed Mobility) SBA   Transfers   Transfer Comments CGA with SEC (pt declines FWW and requests to utilize SEC from home)   Balance   Balance Comments CGA with SEC (pt declines FWW and requests to utilize SEC from home)   Activities of Daily Living   BADL Assessment/Intervention bathing;lower body dressing;grooming;toileting   Bathing Assessment/Intervention   Casey Level (Bathing) supervision   Comment, (Bathing) Per clinical reasoning   Lower Body Dressing Assessment/Training   Comment, (Lower Body Dressing) doffing shoes   Casey Level (Lower Body Dressing)  independent   Grooming Assessment/Training   Anson Level (Grooming) contact guard assist   Comment, (Grooming) standing at sink to wash face   Toileting   Comment, (Toileting) on commode   Anson Level (Toileting) supervision   Clinical Impression   Criteria for Skilled Therapeutic Interventions Met (OT) Yes, treatment indicated   OT Diagnosis decreased safety/engagement in ADLs/IADLs   OT Problem List-Impairments impacting ADL problems related to;activity tolerance impaired;balance;mobility;strength   Assessment of Occupational Performance 1-3 Performance Deficits   Identified Performance Deficits home mgmt, g/h   Planned Therapy Interventions (OT) ADL retraining;strengthening;transfer training;progressive activity/exercise;home program guidelines   Clinical Decision Making Complexity (OT) problem focused assessment/low complexity   Risk & Benefits of therapy have been explained evaluation/treatment results reviewed;care plan/treatment goals reviewed;risks/benefits reviewed;current/potential barriers reviewed;participants voiced agreement with care plan;participants included;patient   Clinical Impression Comments Pt may benefit from continued skilled IP OT services to progress IND and safety with ADLs/IADLs.   OT Total Evaluation Time   OT Eval, Low Complexity Minutes (00247) 7   OT Goals   Therapy Frequency (OT) 3 times/week   OT Predicted Duration/Target Date for Goal Attainment 03/13/25   OT Goals Hygiene/Grooming;Home Management;OT Goal 1   OT: Hygiene/Grooming modified independent   OT: Home Management Modified independent;with light demand household tasks   OT: Goal 1 Pt will IND complete AROM HEP to progress functional endurance for ADLs/IADLs.   OT Discharge Planning   OT Plan BUE HEP, progress functional activity tolerance, standing g/h at sink   OT Discharge Recommendation (DC Rec) home with assist;home with home care occupational therapy   OT Rationale for DC Rec Pt slightly below baseline  but anticipate will progress to home with previous level of assist and resumption of home care therapy.   OT Brief overview of current status CGA/SBA with SEC   OT Total Distance Amb During Session (feet) 20   Total Session Time   Total Session Time (sum of timed and untimed services) 7

## 2025-03-09 NOTE — CONSULTS
[facility]  Transplant Infectious Disease Consult Note - New Patient     Patient:  Luz Thompson, Date of birth 1950, Medical record number [id number]  Date of Visit:  03/09/2025  Consult requested by Dr. Ha Olivas for evaluation of fever of unknown origin in a liver transplant patient         Assessment and Recommendations:   Recommendations:  - Recommend stopping meropenem  - Recommend starting ertapenem  - Agree with vancomycin  - Recommend TTE for evaluation of endocarditis given E. Faecalis in blood cultures and previous fever of unknown origin workup  - Will follow blood cultures and urine cultures in process  - Will check Cocci Ag as a screening.    Thank you very much for this consultation. Transplant Infectious Disease will continue to follow with you.    Patient was discussed with Dr Kam.    Katelyn Dawn MD  Infectious diseases PGY-4    Assessment:  #Fevers  #Enterococcus faecalis bacteremia  #Klebsiella pneumoniae in urine culture  Presenting from home with fever of 102F. She has previously had workup for fever of unknown origin without obvious etiology determine. She presents with fever without clear associated symptoms but on workup was found to have Enterococcus faecalis in 2/2 blood cultures. UA with +leuk est, WBC > 182, +nitrite. Respiratory panel, COVID/influenza/RSV negative. Urine culture with Klebsiella pneumoniae, although denies dysuria, CVA tenderness, or other UTI symptoms, only fever. CT abd/pelvis only noted subtle curvilinear hypodensity in the inferior liver and no current abdominal symptoms. Highest suspicion of E faecalis source is from urine given history of previous UTI symptoms.Her previous ESBL isolates have been urinary but were Klebsiella oxytoca, also susceptible to carbapenems. Given the recent fever of unknown origin, will proceed with TTE to evaluate for possible endocarditis. She does not have any implanted hardware aside from a  "loop recorder which as not caused issues.    Other Infectious Disease issues include:  #Hx coccidioides  Dx in winter [id number], did not tolerate fluconazole due to severe rash. Treated with voriconazole, continued until her return to [place]. CXR with stable calcifications. Recent fungal antibody panel negative though unclear how reliable this is. Voriconazole was stopped 6/2024.Will check a screening Cocci Ag to ensure that this is still under control. Lifelong suppression is often needed but she has refused fluconazole.      #Hx EBV viremia s/p rituxan 2016  No lymphadenopathy on recent abdomen/pelvis CT. Not detected 1/20/2025    #Numerous reported antimicrobial allergies  Per allergy note 8/2/2019 \"Prick and intradermal and patch testing to fluconazole, doxycycline, azithromycin, penicillins, and cephalosporins with immediate and delayed readings being negative. \" States she will never take fluconazole again. Would be candidate for oral provocation testing in future with pcn. Also notes an allergy to the  capsules, states tolerates pills or liquid formulations, sometimes needs them compounded.    - QTc interval: 451 on 3/9/25  - Bacterial coverage: as above  - Pneumocystis prophylaxis: none  - Serostatus & viral prophylaxis: EBV+, CMV-  - Fungal prophylaxis: none, previously voriconazole  - Risk factors to suggest check of Toxoplasma, Strongy, or Schisto serology?: toxoplasma negative 1/20/25  - Immunization status: Up to date on influenza, COVID; due for Tdap  - Isolation status: Good hand hygiene.  - Code status: Full Code        History of Infectious Disease Illness:   Luz Thompson is a 74 year old female with past medical history PBC s/p liver transplant in 2002. She presents from home after having fever to 102F and chills. Aside from the temperature, she had no other symptoms. She reports previous UTIs with dysuria, but that is not present. She reports otherwise being in her usual state of health. " "She has recently had more shoulder pain due to prior tendon tears, she was getting cortisone injections but those are on hold because her blood pressure has been high. Her cardiology has been adjusting her medications for this. No known sick contacts.      Transplants:  5/22/2002 (Liver), Postoperative day:  8327.  Coordinator Angelika Frausto    Review of Systems:  CONSTITUTIONAL:  Positive for fevers and chills  EYES: negative for icterus or acute vision changes  ENT:  negative for acute hearing loss, tinnitus, sore throat  RESPIRATORY:  negative for cough, sputum or dyspnea  CARDIOVASCULAR:  negative for chest pain, palpitations  GASTROINTESTINAL:  negative for nausea, vomiting, diarrhea or constipation  GENITOURINARY:  negative for dysuria or hematuria  HEME:  No easy bruising or bleeding  INTEGUMENT:  negative for rash or pruritus. 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       Past Surgical History:   Procedure Laterality Date    APPENDECTOMY      CATARACT IOL, RT/LT      RE2013, LE12/10/2013 - Toric lenses    CHOLECYSTECTOMY      COLONOSCOPY  03/10/2014    Procedure: COLONOSCOPY;;  Surgeon: Gentry Ramirez MD;  Location: [facility] GI    CYSTOSCOPY      ear drum repair      ENDOBRONCHIAL ULTRASOUND FLEXIBLE N/A 2017    Procedure: ENDOBRONCHIAL ULTRASOUND FLEXIBLE;  Flexible Bronchoscopy, Endobronchial Ultrasound, Transbronchial Needle Aspiration ;  Surgeon: Eden Clinton MD;  Location: [facility] OR    ENDOSCOPIC RETROGRADE CHOLANGIOPANCREATOGRAM  2013    Procedure: ENDOSCOPIC RETROGRADE CHOLANGIOPANCREATOGRAM;  Endoscopic Retrograde Cholangiopancreatogram with single balloon enteroscopy, ballon sweep of bile duct;  Surgeon: Brett Membreno MD;  Location: [facility] OR     KNEE SCOPE,MED/LAT MENISECTOMY Right 08/10/2012    partial medial menisectomy only    KNEE SURGERY  1966    R knee    PICC INSERTION  2013    4fr SL PASV PICC, 40cm (1cm external) in the R basilic vein w/ tip in the low SVC    PICC INSERTION  2014    5 fr DL BioFlo Navilyst PICC, 46 cm (3 cm external) in the L basilic vein w/ tip in the SVC RA junction.    THYROIDECTOMY  2010    TRANSPLANT LIVER RECIPIENT LIVING UNRELATED  2002       Family History   Problem Relation Age of Onset    Hypertension Mother     Endometrial Cancer Mother     Hyperlipidemia Mother     Prostate Cancer Father     Macular Degeneration Father     Cancer - colorectal Maternal Grandmother         in her 80's, has surgery and removal of part of kidney,  at age 98    Heart Disease Maternal  "Grandfather          at 98    Glaucoma Maternal Grandfather     Cerebrovascular Disease Paternal Grandmother         in her 80's    Hypertension Paternal Grandmother     Heart Disease Paternal Grandfather         MI    Alzheimer Disease Paternal Grandfather     Allergies Son     Neurologic Disorder Daughter         Migraines    Breast Cancer Other     Anesthesia Reaction No family hx of     Crohn's Disease No family hx of     Ulcerative Colitis No family hx of     Melanoma No family hx of     Skin Cancer No family hx of        Social History     Social History Narrative    She is retired. [place]. 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     Social History     Tobacco Use    Smoking status: Former     Current packs/day: 0.00     Average packs/day: 1 pack/day for 18.0 years (18.0 ttl pk-yrs)     Types: Cigarettes     Start date: 1967     Quit date: 1985     Years since quittin.9    Smokeless tobacco: Never   Vaping Use    Vaping status: Never Used   Substance Use Topics    Alcohol use: Yes     Alcohol/week: 0.0 standard drinks of alcohol     Comment: rare - \"I toast at weddings\"    Drug use: No       Immunization History   Administered Date(s) Administered    COVID-19 12+ (Pfizer) 10/17/2024    COVID-19 MONOVALENT 12+ (Pfizer) 2021, 03/15/2021, 2021, 10/09/2021    Flu 65+ (Fluad) 2019    [id number] Novel Flu P-free 11/10/2009    HEPA 2001    HepB, Unspecified 2001    Influenza (High Dose) Trivalent,PF (Fluzone) 2015, 10/30/2016, 10/23/2017, 10/24/2024    Influenza (IIV3) PF 11/10/2004, 2007, 10/01/2010, 2012, 10/29/2012, 2013, 2016    Influenza Vaccine 18-64 (Flublok) 10/20/2018    Influenza Vaccine 65+ (Fluzone HD) 2020, 2022, 10/20/2022    Influenza Vaccine, 6+MO IM (QUADRIVALENT W/PRESERVATIVES) 10/01/2020    " Pneumo Conj 13-V (2010&after) 02/26/2014, 01/01/2016    Pneumococcal 23 valent 12/01/2007, 01/01/2014, 05/25/2016    TD,PF 7+ (Tenivac) 01/01/2007    TDAP (Adacel,Boostrix) 01/01/2014    TDAP Vaccine (Adacel) 09/17/2007, 02/26/2014       Patient Active Problem List   Diagnosis    Bladder infection, chronic    Other osteoporosis without current pathological fracture    Osteoarthritis of right knee    HDL deficiency    Vitamin D deficiency    Status post tympanoplasty    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    Prophylactic antibiotic    High risk medication use    Statin intolerance    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    Sensorineural hearing loss (SNHL) of both ears    Abnormal liver function tests    Liver replaced by transplant (H)    Hyperlipidemia LDL goal <70    Hypertension goal BP (blood pressure) < 140/80    Postoperative hypothyroidism    Type 2 diabetes mellitus with stage 3b chronic kidney disease, without long-term current use of insulin (H)    Age-related osteoporosis without current pathological fracture    Tympanic membrane perforation, left    Other infective chronic otitis externa of left ear    Stage 3b chronic kidney disease (H)    Escherichia coli sepsis (H)    Physical deconditioning    Immunosuppression    Papillary thyroid carcinoma (H)    PAF (paroxysmal atrial fibrillation) (H)    Status post liver transplantation (H)    Elevated lactic acid level    Fever in adult    Urinary tract infection    Chronic kidney disease, unspecified CKD stage    Lung infection    Acute cystitis without hematuria    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    Pyelonephritis, acute    Generalized abdominal pain    Left flank pain    Immunocompromised    H/O liver transplant (H)    Sepsis without acute organ dysfunction, due to unspecified organism (H)    Immunosuppressed status    Transient hypotension    Pneumonia due to infectious organism, unspecified laterality, unspecified part of lung    Pneumonia    Shortness of breath    Fever    DERREK (acute  kidney injury)    Sepsis secondary to UTI (H)            Current Medications & Allergies:     Current Facility-Administered Medications   Medication Dose Route Frequency Provider Last Rate Last Admin    apixaban ANTICOAGULANT (ELIQUIS) tablet 5 mg  5 mg Oral BID Madhu Quiñones MD   5 mg at 03/09/25 0850    calcitRIOL (ROCALTROL) capsule 0.25 mcg  0.25 mcg Oral Daily Madhu Quiñones MD   0.25 mcg at 03/09/25 0850    [START ON 3/10/2025] ertapenem (INVanz) 1 g vial to attach to  mL bag  1 g Intravenous Q24H Sindhu Thompson PA-C        ezetimibe (ZETIA) tablet 10 mg  10 mg Oral Daily Madhu Quiñones MD   10 mg at 03/09/25 0850    folic acid (FOLVITE) tablet 1,000 mcg  1,000 mcg Oral Daily Madhu Quiñones MD   1,000 mcg at 03/09/25 0850    gabapentin (NEURONTIN) solution 300 mg  300 mg Oral At Bedtime Madhu Quiñones MD   300 mg at 03/08/25 2129    [Held by provider] hydrALAZINE (APRESOLINE) tablet 25 mg  25 mg Oral TID Madhu Quiñones MD   25 mg at 03/08/25 2129    [START ON 3/10/2025] ketoconazole (NIZORAL) 2 % shampoo   Topical Once per day on Monday Wednesday Friday Madhu Quiñones MD        levothyroxine (SYNTHROID/LEVOTHROID) tablet 175 mcg  175 mcg Oral Daily Madhu Quiñones MD   175 mcg at 03/09/25 0851    linagliptin (TRADJENTA) tablet 5 mg  5 mg Oral Daily Madhu Quiñones MD   5 mg at 03/09/25 0850    [Held by provider] metoprolol succinate ER (TOPROL XL) 24 hr tablet 25 mg  25 mg Oral Daily Madhu Quiñones MD        omega-3 acid ethyl esters (LOVAZA) capsule 1 g  1 g Oral BID Ashley Tamez PA-C   1 g at 03/09/25 1537    predniSONE (DELTASONE) tablet 10 mg  10 mg Oral Daily Madhu Quiñones MD   10 mg at 03/09/25 0849    sirolimus (GENERIC EQUIVALENT) tablet 1 mg  1 mg Oral Daily Madhu Quiñones MD   1 mg at 03/09/25 0850    sodium chloride (PF) 0.9% PF flush 3 mL  3 mL Intracatheter Q8H Madhu Quiñones MD        ursodiol (ACTIGALL) tablet 250 mg  250 mg Oral BID Ishmael  Madhu BOO MD   250 mg at 03/09/25 0850    vancomycin (VANCOCIN) 1,000 mg in 200 mL dextrose intermittent infusion  1,000 mg Intravenous Q24H Ha Olivas  mL/hr at 03/09/25 1838 1,000 mg at 03/09/25 1838       Infusions/Drips:    Current Facility-Administered Medications   Medication Dose Route Frequency Provider Last Rate Last Admin    Patient is already receiving anticoagulation with heparin, enoxaparin (LOVENOX), warfarin (COUMADIN)  or other anticoagulant medication   Does not apply Continuous PRN Madhu Quiñnoes MD           Allergies   Allergen Reactions    Fluconazole Hives and Itching     Full body hives  **Intradermal skin testing negative**  [See intradermal skin testing results from 8/2/2019]    Mycophenolate Diarrhea and Nausea and Vomiting     Patient stated it was chronic and lasted months      Penicillins Anaphylaxis, Hives, Itching and Rash     **Intradermal skin testing negative**  [See intradermal skin testing results from 8/2/2019]      Simvastatin Muscle Pain (Myalgia)     severe  Other reaction(s): Myalgia caused by statin    Methotrexate Other (See Comments)     Other reaction(s): Sore  Sores in mouth, esophagus, and stomach.       Morphine And Codeine Itching and Other (See Comments)     Psych disturbance  Other reaction(s): Confusion, Mood alteration    Quinolones Anxiety, Dizziness, Headache, Other (See Comments), Palpitations and Unknown     Other reaction(s): Hyperactive behavior, Lightheadedness, Mood alteration    Dizzy, light headed    Dizziness, shaky, and jumpy    Benadryl [Diphenhydramine Hcl]      Insomnia     Capsules, Empty Gelatin [Gelatin]     Lansoprazole Diarrhea    Azithromycin Itching     [See intradermal skin testing results from 8/2/2019]    Bactrim [Sulfamethoxazole-Trimethoprim] Other (See Comments)     Numb mouth, tingling lips (treated with anti-histamines)    Cephalosporins Itching     [See intradermal skin testing results from 8/2/2019]    Ciprofloxacin  Hcl Other (See Comments) and Dizziness     Insomnia, mood lability, Irregular heart beat         Doxycycline Itching and Unknown     [See intradermal skin testing results from 8/2/2019]    Lisinopril Cough    Omeprazole Itching    Tolectin [Nsaids] Rash    Tolmetin Rash and Itching    Tramadol Rash, Hives and Itching            Physical Exam:   Patient Vitals for the past 24 hrs:   BP Temp Temp src Pulse Resp SpO2   03/09/25 1533 120/70 -- -- 101 18 96 %   03/09/25 1238 124/54 98.5  F (36.9  C) Oral 84 18 95 %   03/09/25 0845 101/62 -- -- 83 16 97 %   03/09/25 0414 114/60 97.5  F (36.4  C) Oral 61 16 95 %   03/08/25 2310 113/73 99.5  F (37.5  C) Oral 69 16 97 %   03/08/25 2135 (!) 141/62 -- -- 92 -- 97 %   03/08/25 1942 (!) 148/68 99.3  F (37.4  C) Oral 95 16 100 %     Ranges for vital signs:  Temp:  [97.5  F (36.4  C)-99.5  F (37.5  C)] 98.5  F (36.9  C)  Pulse:  [] 101  Resp:  [16-18] 18  BP: (101-148)/(54-73) 120/70  SpO2:  [95 %-100 %] 96 %  There were no vitals filed for this visit.    Physical Examination:  GENERAL: well-developed, well-nourished, in bed in no acute distress.  HEAD:  Head is normocephalic, atraumatic.EYES:  Eyes have anicteric sclerae without conjunctival injection   ENT:  Oropharynx is moist without exudates or ulcers. Tongue is midline  NECK:  Supple. No cervical lymphadenopathy  LUNGS:  Clear to auscultation bilateral.   CARDIOVASCULAR:  Regular rate and rhythm with no murmur  ABDOMEN:  Normal bowel sounds, soft, nontender.   BACK: No CVA tenderness.  SKIN:  No acute rashes. Scab on the left forearm. Left check with approximately 2cm lesion - known SCC  NEUROLOGIC:  Grossly nonfocal. Active x4 extremities         Laboratory Data:     Absolute CD4   Date Value Ref Range Status   08/19/2014 415 mm3 Final   09/16/2013 1,266 mm3 Final   09/15/2013 DUPLICATE,TESTING DONE ON SPECIMEN FROM 9.16.13 mm3 Final   11/13/2008 1332 mm3 Final       Inflammatory & Autoimmune Markers    Recent Labs    Lab Test 01/13/25  1410 06/23/23  1422 06/13/23  1820 08/26/19  2331 05/20/19  0957 07/30/18  0855   SED  --   --   --  78* 47* 73*   CRP  --   --   --  180.0* <2.9 5.2   CRPI 3.26 <3.00   < >  --   --   --     < > = values in this interval not displayed.       Immune Globulin Studies     Recent Labs   Lab Test 08/05/19  1552   IGG 1,110             Metabolic Studies       Recent Labs   Lab Test 03/09/25  1720 03/09/25  1043 03/08/25  1852 03/08/25  1606 01/20/25  1800 01/20/25  1635 12/11/24  0229 12/10/24  2148   NA  --  141  --  138   < >  --    < >  --    POTASSIUM  --  4.0  --  4.4   < >  --    < >  --    CHLORIDE  --  108*  --  103   < >  --    < >  --    CO2  --  20*  --  24   < >  --    < >  --    ANIONGAP  --  13  --  11   < >  --    < >  --    BUN  --  38.5*  --  51.3*   < >  --    < >  --    CR  --  1.34*  --  1.56*   < >  --    < >  --    GFRESTIMATED  --  41*  --  34*   < >  --    < >  --    * 136*  --  183*   < >  --    < >  --    A1C  --   --   --   --   --   --   --  6.9*   KEILY  --  8.9  --  10.4   < >  --    < >  --    PHOS  --  2.8  --   --   --   --   --   --    MAG  --  4.0*  --   --   --   --   --   --    LACT  --   --  1.0 2.2*  --   --    < >  --    PCAL  --   --   --  0.11  --   --    < >  --    FGTL  --   --   --   --   --  37  --   --     < > = values in this interval not displayed.       Hepatic Studies    Recent Labs   Lab Test 03/09/25  1043 03/08/25  1606 01/22/25  0632 01/20/25  0614 01/06/25  2306 01/03/25  1733 06/13/23  1820 12/05/22  0954   BILITOTAL 0.4 0.5 0.2  --    < > 0.4   < >  --    36735  --   --   --   --   --   --   --  0.3   DBIL  --   --  <0.20  --    < > <0.20   < >  --    ALKPHOS 102 110 103  --    < > 108   < >  --    29169  --   --   --   --   --   --   --  165*   PROTTOTAL 6.1* 6.5 5.9*  --    < > 6.3*   < >  --    37183  --   --   --   --   --   --   --  6.6   ALBUMIN 3.2* 3.5 3.1*  --    < > 3.5   < >  --    41716  --   --   --   --   --    --   --  4.0   AST 46* 58* 18  --    < > 22   < >  --    95948  --   --   --   --   --   --   --  23   ALT 84* 61* 22  --    < > 26   < >  --    13683  --   --   --   --   --   --   --  33   LDH  --   --   --  266*  --   --   --   --    TIFFANY  --   --   --   --   --  20  --   --     < > = values in this interval not displayed.       Pancreatitis testing    Recent Labs   Lab Test 01/03/25  1733 12/10/24  1446 09/04/24  1008 09/18/20  0000 08/26/19  2331   LIPASE 29 33  --   --  66*   TRIG  --   --  329*   < >  --     < > = values in this interval not displayed.       Gout Labs      Recent Labs   Lab Test 08/05/19  1552   URIC 5.6       Hematology Studies   Recent Labs   Lab Test 03/09/25  1043 03/08/25  1606 02/06/25  1600 01/23/25  0639 01/04/25  0742 01/03/25  1733 10/11/24  2212 09/30/24  1545 06/13/23  1820 12/05/22  0954   WBC 15.1* 18.9* 7.8 7.5   < > 9.2   < > 8.2   < >  --    50512  --   --   --   --   --   --   --   --   --  9.0   ANEU  --   --   --   --   --  6.9  --  3.7  --   --    ANEUTAUTO  --  16.4* 5.1  --    < >  --    < >  --    < >  --    ALYM  --   --   --   --   --  1.0  --  2.7   < >  --    ALYMPAUTO  --  0.9 1.8  --    < >  --    < >  --    < >  --    KATHY  --   --   --   --   --  1.0  --  1.0   < >  --    AMONOAUTO  --  1.3 0.7  --    < >  --    < >  --    < >  --    AEOS  --   --   --   --   --  0.0  --  0.2   < >  --    AEOSAUTO  --  0.0 0.0  --    < >  --    < >  --    < >  --    ABSBASO  --  0.0 0.0  --    < >  --    < >  --    < >  --    HGB 10.5* 10.8* 10.6* 9.1*   < > 10.2*   < > 11.1*   < >  --    28322  --   --   --   --   --   --   --   --   --  11.9   HCT 34.2* 34.2* 32.7* 31.5*   < > 32.2*   < > 35.0   < >  --     346 395 394   < > 418   < > 388   < >  --    91768  --   --   --   --   --   --   --   --   --  406    < > = values in this interval not displayed.       Clotting Studies    Recent Labs   Lab Test 01/04/25  1537 01/04/25  0742 01/03/25  1733 08/26/19  2751  "05/18/18  0731   INR  --   --  1.23* 1.33* 1.06   PTT 31   < >  --  38* 33    < > = values in this interval not displayed.       Iron Testing    Recent Labs   Lab Test 03/09/25  1043 09/04/24  1008 06/15/23  1028 08/26/19  2331 08/05/19  1552 07/30/18  0913 07/30/18  0855   IRON  --   --   --   --  56  --  39   FEB  --   --   --   --  267  --  307   IRONSAT  --   --   --   --  21  --  13*   LAURA  --   --   --   --  118  --  18   MCV 89   < > 92   < > 88   < >  --    B12  --   --  456  --   --   --   --     < > = values in this interval not displayed.       Markers  No lab results found.    Invalid input(s): \"FETOPROTEIN\", \"SERUM\", \"AFP\"    Autoimmune Testing  No lab results found.    Invalid input(s): \"PRO3\", \"C3\", \"C4\", \"VASPAN\"    Blood Gas Testing    Recent Labs   Lab Test 01/06/25  2302 01/03/25  1735   PHV 7.50* 7.47*   PCO2V 27* 28*   PO2V 37 42   HCO3V 21 20*        Thyroid Studies     Recent Labs   Lab Test 01/20/25  1635 11/14/24  1506 05/20/19  0957 10/16/18  0902 07/30/18  0855   TSH 1.53 3.31 12.11* 18.39* 8.64*   T4  --  1.92* 0.99 0.85 1.02   T3  --   --   --   --  44*       Urine Studies     Recent Labs   Lab Test 03/08/25  1737 02/07/25  0215 01/22/25  1727 01/21/25  1422 01/19/25  2100   URINEPH 6.0 5.5 5.5 5.5 6.0   NITRITE Positive* Negative Negative Negative Negative   LEUKEST Large* Small* Large* Large* Trace*   WBCU >182* 10-25* >182* >182* 7*   WBC CLUMPS Present*  --  Present*  --   --        Medication levels    Recent Labs   Lab Test 01/06/25  2306 09/04/24  1008 08/29/19  0544 04/17/18  0942 10/23/17  1025   VCON  --   --  2.2   < >  --    CYCLSP  --   --   --   --  Canceled, Test credited   RAPAMY 4.5*   < > 9.8   < >  --     < > = values in this interval not displayed.       CSF testing   No lab results found.    Invalid input(s): \"CADAM\", \"EVPCR\", \"ENTPCR\", \"ENTEROVIRUS\"    Body fluid stats  No lab results found.    Microbiology:  Fungal testing  Recent Labs   Lab Test 01/20/25  1635 " 01/13/25  1425 01/13/25  1410   HISGAQNTUR  --  Not Detected  --    FGTL 37  --   --    FGTLI Negative  --   --    COFUNG  --   --  <1:2   ASPA  --   --  <1:8   HISTOMYCF  --   --  <1:8   HISTOYEACF  --   --  <1:8   FUNBL  --   --  0.6       Last Culture results   Rapid Strep A Screen   Date Value Ref Range Status   10/05/2005   Final    NEGATIVE: No Group A streptococcal antigen detected by immunoassay, await     Comment:      culture report.  Critical Value called to and read back by Theodora in Transplant clinic 13:20pm   10/5/05 ()     Culture   Date Value Ref Range Status   03/08/2025 No growth after 12 hours  Preliminary   03/08/2025 >100,000 CFU/mL Klebsiella pneumoniae (A)  Preliminary   03/08/2025 Positive on the 1st day of incubation (A)  Preliminary   03/08/2025 Gram positive cocci (AA)  Preliminary     Comment:     2 of 2 bottles   02/07/2025 10,000-50,000 CFU/mL Mixture of Urogenital Louann  Final   01/22/2025 10,000-50,000 CFU/mL Mixture of urogenital louann  Final   01/21/2025 >100,000 CFU/mL Mixture of Urogenital Louann  Final   01/20/2025 No Growth  Final   01/20/2025 No Growth  Final   01/20/2025 No Growth  Final   01/19/2025 <10,000 CFU/mL Mixture of Urogenital Louann  Final   01/19/2025 No Growth  Final   01/19/2025 Positive on the 1st day of incubation (A)  Final   01/19/2025 Staphylococcus epidermidis (AA)  Final     Comment:     1 of 2 bottles  The recovery of this organism from a single blood culture bottle most likely represents contamination. If susceptibility testing is needed, please refer to the antibiogram or contact [facility]. If contamination is suspected, it is important to repeat two sets of blood cultures from two different sites.   01/10/2025 <10,000 CFU/mL Urogenital louann  Final   01/07/2025 No Growth  Final   01/06/2025 No Growth  Final   01/03/2025 No Growth  Final   01/03/2025 No Growth  Final   12/10/2024 No Growth  Final     Culture Micro   Date Value Ref Range Status   08/30/2019 No  growth  Final   08/29/2019 No growth  Final   08/28/2019 No growth  Final   08/28/2019 No growth  Final   08/27/2019 10,000 to 50,000 colonies/mL  Escherichia coli   (A)  Final   08/27/2019 (A)  Final    Cultured on the 1st day of incubation:  Escherichia coli  Susceptibility testing done on previous specimen     08/27/2019   Final    Critical Value/Significant Value, preliminary result only, called to and read back by   Maria Teresa Martines RN 08/27/2019 @1425 dk/hdp     08/26/2019 (A)  Final    Cultured on the 1st day of incubation:  Escherichia coli     08/26/2019   Final    Critical Value/Significant Value, preliminary result only, called to and read back by  [facility] 1229 [id number].BRANDON     08/26/2019   Final    (Note)  POSITIVE for E.COLI by Verigene multiplex nucleic acid test. Final  identification and antimicrobial susceptibility testing will be  verified by standard methods. Verigene test will not distinguish  E.coli from Shigella species including S.dysenteriae, S.flexneri,  S.boydii, and S.sonnei. Specimens containing Shigella species or  E.coli will be reported as Positive for E.coli.    Specimen tested with Verigene multiplex, gram-negative blood culture  nucleic acid test for the following targets: Acinetobacter sp.,  Citrobacter sp., Enterobacter sp., Proteus sp., E. coli, K.  pneumoniae/oxytoca, P. aeruginosa, and the following resistance  markers: CTXM, KPC, NDM, VIM, IMP and OXA.    Critical Value/Significant Value called to and read back by  Flower Lujan Rn @ 1449 8.27.19 CS         Escherichia coli   Date Value Ref Range Status   09/22/2024 Not Detected Not Detected Final           Last checks of Clostridioides difficile testing  No lab results found.    Syphilis Testing    Treponema Antibody Total   Date Value Ref Range Status   01/20/2025 Nonreactive Nonreactive Final       Quantiferon testing   Recent Labs   Lab Test 03/08/25  1606 02/06/25  1600 01/20/25  1635   TBRES  --   --  Negative  "  LYMPH 5 23  --        Infection Studies to assess Diarrhea  No lab results found.    Invalid input(s): \"BIDYD\"    Virology:  Coronavirus-19 testing    Recent Labs   Lab Test 03/08/25  1549 01/19/25  1908 01/06/25  2309 01/03/25  1746   [id number] Negative Negative Negative Negative       Respiratory virus (non-coronavirus-19) testing    Recent Labs   Lab Test 03/08/25  1549 01/20/25  1453 01/06/25  2309 01/04/25  1522   IFLUA Not Detected Not Detected  --  Not Detected   INFZA Negative  --    < >  --    FLUAH1 Not Detected Not Detected  --  Not Detected   YS4737 Not Detected Not Detected  --  Not Detected   FLUAH3 Not Detected Not Detected  --  Not Detected   IFLUB Not Detected Not Detected  --  Not Detected   INFZB Negative  --    < >  --    PIV1 Not Detected Not Detected  --  Not Detected   PIV2 Not Detected Not Detected  --  Not Detected   PIV3 Not Detected Not Detected  --  Not Detected   PIV4 Not Detected Not Detected  --  Not Detected   IRSV Negative  --    < >  --    RSVA Not Detected Not Detected  --  Not Detected   RSVB Not Detected Not Detected  --  Not Detected   HMPV Not Detected Not Detected  --  Not Detected   RHINEV Not Detected Not Detected  --  Not Detected   ADENOV Not Detected Not Detected  --  Not Detected   CORONA Not Detected Not Detected  --  Not Detected    < > = values in this interval not displayed.       Viral loads    Recent Labs   Lab Test 01/20/25  1635 08/28/19  0528 07/30/18  0856   EBQI Not Detected  --   --    EBRES  --  2,386*  --    CMVQNT Not Detected  --  CMV DNA Not Detected   CMVLOG  --   --  Not Calculated   PRVPC Not Detected  --   --        CMV resistance testing  No lab results found.    EBV DNA Copies/mL   Date Value Ref Range Status   08/28/2019 2,386 (A) EBVNEG^EBV DNA Not Detected [Copies]/mL Final   07/30/2018 2,166 (A) EBVNEG^EBV DNA Not Detected [Copies]/mL Final   08/22/2017 EBV DNA Not Detected EBVNEG^EBV DNA Not Detected [Copies]/mL Final   04/11/2017 (A) " "EBVNEG [Copies]/mL Final    <500  EBV DNA Detected below the reportable range of 500 Copies/mL     12/09/2016 1,219 (A) EBVNEG [Copies]/mL Final   08/08/2016 29,569 (A) EBVNEG [Copies]/mL Final     EB Virus DNA Quant Copy/mL   Date Value Ref Range Status   04/11/2017 <390  Unit: cpy/mL    Final   08/26/2016   Final    Canceled, Test credited   Lab accident - specimen processed incorrectly     08/08/2016   Final    Canceled, Test credited   Incorrectly ordered by PCU/Clinic     07/26/2016 Canceled, Test credited  Final   05/24/2016   Final    Test canceled - Lab  error  INCORRECTLY ORDERED, 1003 5.24.16      11/20/2015   Final    Incorrectly ordered by U/Clinic  REORDERED AND SENT TO VIROLOGY         BK Virus Result   Date Value Ref Range Status   07/28/2011 <1000 <1000 copies/mL Final       Parvovirus Testing    Recent Labs   Lab Test 01/20/25  1635   PRVG 1.82*   PRVM 0.16   PRVPC Not Detected       Adenovirus Testing  No lab results found.    Invalid input(s): \"ADENAB\", \"ADENOVIRUS\", \"ADQT\"    Viral Serology Table     Recent Labs   Lab Test 01/20/25  1635   TOXGONGIAB 5.0       Imaging:  Recent Results (from the past 48 hours)   XR Chest 1 View    Narrative    Exam: XR CHEST 1 VIEW, 3/8/2025 4:35 PM    Comparison: 1/19/2025    History: Fever    Findings:    Single AP portable chest.    Trachea is midline. Stable cardiac mediastinal silhouette. Similar  metallic opacity/device projecting over the left chestThere is no  discernible pneumothorax or pleural effusion. streaky right basilar  opacities      Impression    Impression: Streaky right basilar opacities, may represent  atelectasis, scarring or infiltrate in the appropriate clinical  settings.    I have personally reviewed the examination and initial interpretation  and I agree with the findings.    ROSENDO HIRSCH MD         SYSTEM ID:  [id number]   CT Abdomen Pelvis w/o Contrast    Narrative    EXAMINATION: CT ABDOMEN PELVIS W/O CONTRAST, " 3/8/2025 6:38 PM    INDICATION: History liver transplant, fever, mildly elevated LFTs  evaluate cholangitis patient declining contrast    COMPARISON STUDY: CT chest and pelvis 1/22/2025    TECHNIQUE: CT scan of the abdomen and pelvis was performed on  multidetector CT scanner using volumetric acquisition technique and  images were reconstructed in multiple planes with variable thickness  and reviewed on dedicated workstations.     CONTRAST: None    CT scan radiation dose is optimized to minimum requisite dose using  automated dose modulation techniques.    FINDINGS:      Similar right lung base calcifications. No pleural effusion. No  pericardial effusion.    Limited evaluation of abdominal parenchymal organs due to noncontrast  technique.     Postsurgical changes of liver transplant. Curvilinear hyperdensity  seen along the inferior right lobe (3/67 and 4/42, may represent  peripheral biliary ductal dilatation or edema, similar to more  conspicuous compared to prior CT from 1/22/2025 . Gallbladder is  surgically absent.      Unremarkable noncontrast appearance of the adrenal glands, spleen  atrophic appearing pancreas. Similar likely pancreatic cyst measuring  1.4 cm, stable. No pancreatic ductal dilatation. Scattered osseous  sclerotic calcification of the abdominal aorta without aneurysmal  dilatation. Colonic diverticulosis without evidence for  diverticulitis. Small fat-containing ventral hernia.  No high-grade bowel obstruction. No ascites. Similar vascular  calcification/nonobstructive renal calculi. No new renal mass or  hydronephrosis.. No pelvic mass.          Impression    IMPRESSION:       1. Postsurgical changes of liver transplant, subtle curvilinear  hypodensity in the inferior liver (4/42 and 3/67 may represent  peripheral biliary ductal dilatation or edema, similar to slightly  more conspicuous compared to prior CT scan; if persistent clinical  concern for cholangitis, consider gastroenterology  "consult and/or  MRCP.  2. Fluid density subcentimeter lesion in the pancreatic head region,  may represent pancreatic cysts/IPMN, consider follow-up is suggested  below..  3. Diverticulosis without definite diverticulitis  4. Additional findings similar to prior CT from 12/10/2024    International evidence-based [place] guidelines for the management of  intraductal papillary mucinous neoplasm of the pancreas:   Surveillance is recommended if no high risk stigmata or worrisome  features are present:  Primary imaging modalities recommended are MRI/MRCP and MDCT  Size of largest cyst:   *  Less than 20 mm: Follow-up imaging in 6 months once, then every 18  months if no change. Stop surveillance if stable for 5 years.  *  More than 20 mm but less than 30 mm: Follow-up imaging at 6 months,  12 months, then yearly if no change.   *  More than 30 mm- follow up imaging every 6 months.    GI consultation for surgery/endoscopic ultrasound is recommended for  cysts with \"high-risk stigmata\" of high grade dysplasia or invasive  carcinoma such as:  1. Obstructive jaundice in a patient with cystic lesion of the head of  the pancreas  2. Enhancing mural nodule > 5mm or solid component  3. Main pancreatic duct >10mm  4. Suspicious or positive results of cytology    If worrisome features are present, GI consultation is recommended.  Worrisome features on imagin. Cyst more than or equal to 30 mm  2. Thickened or enhancing cyst walls  3. Main pancreatic duct > 5mm and < 10mm.  4. Abrupt change in caliber of pancreatic duct with distal atrophy  5. Lymphadenopathy  6. Cyst growth rate > 2.5mm/year    *Reference: International evidence-based [place] guidelines for the  management of  intraductal papillary mucinous neoplasm of the pancreas Pancreatology:  24:(); 255-270.  https://doi.org/10.1016/j.mccracken.2023.12.009       "

## 2025-03-09 NOTE — PLAN OF CARE
Goal Outcome Evaluation:      Plan of Care Reviewed With: patient    Overall Patient Progress: no changeOverall Patient Progress: no change     Pt anticipates discharge back to [facility] with [facility] at discharge.

## 2025-03-09 NOTE — CODE/RAPID RESPONSE
Brief Medicine Note:    Paged by Transplant ID with recs. Switched meropenem to ertapenem, continue vancomycin. Ordered TTE.    Sindhu Thompson PA-C  Internal Medicine [facility] Hospitalist  [facility]

## 2025-03-09 NOTE — MEDICATION SCRIBE - ADMISSION MEDICATION HISTORY
Medication Scribe Admission Medication History    Admission medication history is complete. The information provided in this note is only as accurate as the sources available at the time of the update.    Information Source(s): Patient, [facility]/Sure[employer], and [facility]  via in-person    Pertinent Information: per pt, [facility], pt reported taking medications on PTA medication list as directed, stated she is not allergic to Benadryl, which is list on allergies list.     Changes made to PTA medication list:  Added: None  Deleted: None  Changed: None    Allergies reviewed with patient and updates made in EHR: yes    Medication History Completed By: Maria Luisa Young 3/9/2025 7:12 AM    PTA Med List   Medication Sig Note Last Dose/Taking    apixaban ANTICOAGULANT (ELIQUIS) 5 MG tablet Take 1 tablet (5 mg) by mouth 2 times daily.  3/8/2025    calcitRIOL (ROCALTROL) 0.25 MCG capsule Take 1 capsule (0.25 mcg) by mouth daily.  3/7/2025    Continuous Glucose Sensor (FREESTYLE NINO 2 SENSOR) MISC Change every 14 days.  Taking    D-MANNOSE PO Take 1 Scoop by mouth 2 times daily.  3/7/2025    EPINEPHrine (ANY BX GENERIC EQUIV) 0.3 MG/0.3ML injection 2-pack Inject 0.3 mLs (0.3 mg) into the muscle as needed for anaphylaxis. May repeat one time in 5-15 minutes if response to initial dose is inadequate. 3/9/2025: Has not used Taking As Needed    estradiol (ESTRACE) 0.1 MG/GM vaginal cream Place vaginally every other day.  3/7/2025    evolocumab (REPATHA SURECLICK) 140 MG/ML prefilled autoinjector Inject 1 mL (140 mg) subcutaneously every 14 days. . Please have fasting labs drawn for further refills.  1/28/2025    ezetimibe (ZETIA) 10 MG tablet Take 1 tablet (10 mg) by mouth daily.  3/8/2025    Ferrous Sulfate 324 MG TBEC Take 1 tablet by mouth 2 times daily as needed.  Unknown    folic acid (FOLVITE) 1 MG tablet TAKE 1 TABLET BY MOUTH DAILY  3/7/2025    gabapentin (NEURONTIN) 250 MG/5ML solution Take 6 mLs (300 mg) by mouth at  bedtime  3/8/2025    glucose (BD GLUCOSE) 5 g chewable tablet Take 2 tablets (10 g) by mouth as needed (low blood sugar)  Taking As Needed    hydrALAZINE (APRESOLINE) 25 MG tablet Take 1 tablet (25 mg) by mouth 3 times daily.  3/7/2025    ketoconazole (NIZORAL) 2 % external cream Apply topically 2 times daily as needed (redness and flaking). Apply to the face.  Unknown    ketoconazole (NIZORAL) 2 % external shampoo Apply topically three times a week. Lather in the shower, wait 3-5 minutes before rinsing.  Unknown    levothyroxine (SYNTHROID/LEVOTHROID) 175 MCG tablet Take 1 tablet (175 mcg) by mouth daily.  3/7/2025 Morning    linagliptin (TRADJENTA) 5 MG TABS tablet Take 1 tablet (5 mg) by mouth daily.  3/7/2025    meclizine (ANTIVERT) 25 MG tablet Take 1 tablet (25 mg) by mouth as needed for dizziness or other (migraines)  Unknown    metoprolol succinate ER (TOPROL XL) 25 MG 24 hr tablet Take 1 tablet (25 mg) by mouth daily. Take an extra tablet daily as needed for breakthrough afib. May repeat in two hours if heart rate remains >100bpm (no more than 4 tablets per day).  3/7/2025    Multiple Vitamins-Minerals (PRESERVISION AREDS 2) CAPS Take 1 capsule by mouth 2 times daily  3/7/2025    Nutrisource Fiber PO packet Take 1 packet by mouth daily.  3/7/2025    omega-3 acid ethyl esters (LOVAZA) 1 g capsule Take 1 capsule by mouth 2 times daily.  3/7/2025    predniSONE (DELTASONE) 10 MG tablet Take 1 tablet (10 mg) by mouth daily.  3/8/2025    sirolimus (GENERIC EQUIVALENT) 1 MG tablet Take 1 tablet (1 mg) by mouth daily.  3/8/2025    ursodiol (ACTIGALL) 250 MG tablet Take 1 tablet (250 mg) by mouth 2 times daily.  3/8/2025

## 2025-03-09 NOTE — PROGRESS NOTES
[facility]    Medicine Progress Note - Hospitalist Service, GOLD TEAM 1    Date of Admission:  3/8/2025    Assessment & Plan   Luz Thompson is a 74 year old woman with a history of primary biliary cirrhosis s/p liver transplant (2002), paroxysmal atrial fibrillation on Eliquis, CVA, CKD, HTN, DM2, PAD, hypothyroidism who was admitted with sepsis.     Sepsis  Klebsiella pneumoniae UTI  Gram positive cocci bacteremia  Hx of recurrent UTIs  Follows with Urology and ID for recurrent UTIs. Presented with fever of 100.6 DegF, leukocytosis. Urinalysis c/w infection with urine culture now growing >100k colonies Klebsiella pneumoniae, does have a hx of ESBL Klebsiella. 2 of 2 blood cultures growing gram positive cocci. Started meropenem and received 1x dose of vancomycin in ED.   - Transplant ID consult   - Continue meropenem renally dosed at 1 gram q12h   - Start IV vancomycin    - Repeat blood cultures x2   - Follow pending blood and urine cultures   - PT, OT consults   - Vitals q4h    Transaminitis  Primary biliary cirrhosis s/p liver transplant (2002)  Labs on admission notable for ALT 61, AST 58 with normal alk phos and Tbili. CT a/p with subtle hypodensity in inferior liver c/f peripheral biliary ductal dilatation or edema.   - Hepatology consult   - IS:   - Prednisone 10 mg daily   - Sirolimus 1 mg daily   - Ursodiol 250 mg BID   - Trend hepatic panel    HTN  HLD  Paroxsymal atrial fibrillation  Blood pressures soft secondary to the above. Bradycardic overnight with HR in 40s, asymptomatic   - Hold PTA hydralazine, metoprolol. Resume as BP allows.   - Continue PTA Eliquis 5 mg BID   - Continue PTA ezetimibe   - Telemetry monitoring    DM2  Diabetic neuropathy  A1c 6.9 in December.    - Continue PTA linagliptin   - Continue PTA gabapentin   - BG checks BID. No need for sliding scale insulin currently, initiate if BG persistently >180    CKD IIIb: Creatinine stable  "within baseline range of ~1.4 - 1.6. Next scheduled with Nephrology on 4/2/25.     Hypothyroidism: Continue PTA levothyroxine.     SCC of left cheek: Scheduled for MOHS excision with Dr. May on 4/1/25.           Diet: High Consistent Carb (75 g CHO per Meal) Diet    DVT Prophylaxis: DOAC  Ron Catheter: Not present  Lines: None     Cardiac Monitoring: None  Code Status: Full Code      Clinically Significant Risk Factors Present on Admission          # Hyperchloremia: Highest Cl = 108 mmol/L in last 2 days, will monitor as appropriate       # Hypercalcemia: corrected calcium is >10.1, will monitor as appropriate    # Hypoalbuminemia: Lowest albumin = 3.2 g/dL at 3/9/2025 10:43 AM, will monitor as appropriate  # Drug Induced Coagulation Defect: home medication list includes an anticoagulant medication    # Hypertension: Noted on problem list      # Anemia: based on hgb <11      # DMII: A1C = 6.9 % (Ref range: <5.7 %) within past 6 months    # Overweight: Estimated body mass index is 26.23 kg/m  as calculated from the following:    Height as of 2/14/25: 1.715 m (5' 7.5\").    Weight as of 2/12/25: 77.1 kg (170 lb).       # Financial/Environmental Concerns:           Social Drivers of Health    Housing Stability: Low Risk  (1/27/2025)    Housing Stability     Do you have housing? : Yes     Are you worried about losing your housing?: No   Recent Concern: Housing Stability - High Risk (1/22/2025)    Housing Stability     Do you have housing? : No     Are you worried about losing your housing?: No   Tobacco Use: Medium Risk (2/12/2025)    Patient History     Smoking Tobacco Use: Former     Smokeless Tobacco Use: Never   Transportation Needs: High Risk (1/27/2025)    Transportation Needs     Within the past 12 months, has lack of transportation kept you from medical appointments, getting your medicines, non-medical meetings or appointments, work, or from getting things that you need?: Yes   Physical Activity: " Insufficiently Active (11/9/2023)    Received from [facility]    Exercise Vital Sign     Days of Exercise per Week: 5 days     Minutes of Exercise per Session: 10 min          Disposition Plan     Medically Ready for Discharge: Anticipated in 2-4 Days           The patient's care was discussed with the Attending Physician, Dr. Olivas, Bedside Nurse, and Patient.    Ashley Tamez PA-C  Hospitalist Service, GOLD TEAM [address] [place] [address]  Securely message with [employer] (more info)  Text page via [facility] Paging/Directory   See signed in provider for up to date coverage information  ______________________________________________________________________    Interval History   Feeling better overall. Fever resolved overnight.     Physical Exam   Vital Signs: Temp: 97.5  F (36.4  C) Temp src: Oral BP: 101/62 Pulse: 83   Resp: 16 SpO2: 97 % O2 Device: None (Room air) Oxygen Delivery: 1 LPM  Weight: 0 lbs 0 oz    Constitutional: Awake and alert, in no apparent distress. Sitting up comfortably in bed  Eyes: Sclera clear, anicteric   Respiratory: Breathing non-labored. CTAB  Cardiovascular:  RRR, normal S1/S2. No rubs or murmurs.   GI: Soft, non-tender, non-distended. Normoactive bowel sounds.   Skin:  Good color. No jaundice. No visible rashes, lesions, or bruising of concern.   Neurologic: Alert and fully oriented      Medical Decision Making       55 MINUTES SPENT BY ME on the date of service doing chart review, history, exam, documentation & further activities per the note.      Data   ------------------------- PAST 24 HR DATA REVIEWED -----------------------------------------------    I have personally reviewed the following data over the past 24 hrs:    15.1 (H)  \   10.5 (L)   / 323     141 108 (H) 38.5 (H) /  136 (H)   4.0 20 (L) 1.34 (H) \     ALT: 84 (H) AST: 46 (H) AP: 102 TBILI: 0.4   ALB: 3.2 (L) TOT PROTEIN: 6.1 (L) LIPASE: N/A     Trop: 30 (H) BNP: 963 (H)     Procal:  0.11 CRP: N/A Lactic Acid: 1.0         Imaging results reviewed over the past 24 hrs:   Recent Results (from the past 24 hours)   XR Chest 1 View    Narrative    Exam: XR CHEST 1 VIEW, 3/8/2025 4:35 PM    Comparison: 1/19/2025    History: Fever    Findings:    Single AP portable chest.    Trachea is midline. Stable cardiac mediastinal silhouette. Similar  metallic opacity/device projecting over the left chestThere is no  discernible pneumothorax or pleural effusion. streaky right basilar  opacities      Impression    Impression: Streaky right basilar opacities, may represent  atelectasis, scarring or infiltrate in the appropriate clinical  settings.    I have personally reviewed the examination and initial interpretation  and I agree with the findings.    ROSENDO HIRSCH MD         SYSTEM ID:  [id number]   CT Abdomen Pelvis w/o Contrast    Narrative    EXAMINATION: CT ABDOMEN PELVIS W/O CONTRAST, 3/8/2025 6:38 PM    INDICATION: History liver transplant, fever, mildly elevated LFTs  evaluate cholangitis patient declining contrast    COMPARISON STUDY: CT chest and pelvis 1/22/2025    TECHNIQUE: CT scan of the abdomen and pelvis was performed on  multidetector CT scanner using volumetric acquisition technique and  images were reconstructed in multiple planes with variable thickness  and reviewed on dedicated workstations.     CONTRAST: None    CT scan radiation dose is optimized to minimum requisite dose using  automated dose modulation techniques.    FINDINGS:      Similar right lung base calcifications. No pleural effusion. No  pericardial effusion.    Limited evaluation of abdominal parenchymal organs due to noncontrast  technique.     Postsurgical changes of liver transplant. Curvilinear hyperdensity  seen along the inferior right lobe (3/67 and 4/42, may represent  peripheral biliary ductal dilatation or edema, similar to more  conspicuous compared to prior CT from 1/22/2025 . Gallbladder is  surgically  "absent.      Unremarkable noncontrast appearance of the adrenal glands, spleen  atrophic appearing pancreas. Similar likely pancreatic cyst measuring  1.4 cm, stable. No pancreatic ductal dilatation. Scattered osseous  sclerotic calcification of the abdominal aorta without aneurysmal  dilatation. Colonic diverticulosis without evidence for  diverticulitis. Small fat-containing ventral hernia.  No high-grade bowel obstruction. No ascites. Similar vascular  calcification/nonobstructive renal calculi. No new renal mass or  hydronephrosis.. No pelvic mass.          Impression    IMPRESSION:       1. Postsurgical changes of liver transplant, subtle curvilinear  hypodensity in the inferior liver (4/42 and 3/67 may represent  peripheral biliary ductal dilatation or edema, similar to slightly  more conspicuous compared to prior CT scan; if persistent clinical  concern for cholangitis, consider gastroenterology consult and/or  MRCP.  2. Fluid density subcentimeter lesion in the pancreatic head region,  may represent pancreatic cysts/IPMN, consider follow-up is suggested  below..  3. Diverticulosis without definite diverticulitis  4. Additional findings similar to prior CT from 12/10/2024    International evidence-based [place] guidelines for the management of  intraductal papillary mucinous neoplasm of the pancreas:   Surveillance is recommended if no high risk stigmata or worrisome  features are present:  Primary imaging modalities recommended are MRI/MRCP and MDCT  Size of largest cyst:   *  Less than 20 mm: Follow-up imaging in 6 months once, then every 18  months if no change. Stop surveillance if stable for 5 years.  *  More than 20 mm but less than 30 mm: Follow-up imaging at 6 months,  12 months, then yearly if no change.   *  More than 30 mm- follow up imaging every 6 months.    GI consultation for surgery/endoscopic ultrasound is recommended for  cysts with \"high-risk stigmata\" of high grade dysplasia or " invasive  carcinoma such as:  1. Obstructive jaundice in a patient with cystic lesion of the head of  the pancreas  2. Enhancing mural nodule > 5mm or solid component  3. Main pancreatic duct >10mm  4. Suspicious or positive results of cytology    If worrisome features are present, GI consultation is recommended.  Worrisome features on imagin. Cyst more than or equal to 30 mm  2. Thickened or enhancing cyst walls  3. Main pancreatic duct > 5mm and < 10mm.  4. Abrupt change in caliber of pancreatic duct with distal atrophy  5. Lymphadenopathy  6. Cyst growth rate > 2.5mm/year    *Reference: International evidence-based [place] guidelines for the  management of  intraductal papillary mucinous neoplasm of the pancreas Pancreatology:  24:(); 255-270.  [email or web address]/[email or web address]

## 2025-03-09 NOTE — H&P
[facility]    History and Physical - Hospitalist Service, GOLD TEAM        Date of Admission:  3/8/2025    Assessment & Plan      Luz Thompson is a 74 year old female admitted on 3/8/2025. She medical history is significant for primary biliary cirrhosis s/p liver transplant (2002) (on sirolimus, prednisone 10 mg daily and ursodiol), paroxysmal atrial fibrillation on Eliquis, history of CVA, CKD IIIb, diabetes mellitus type 2 not on insulin, hypothyroidism, hypertension, and PAD.  She presented to the ED on 3/8 due to chills and measuring a fever of 102 deg F at home. She was admitted on 3/8 due to concern for sepsis related to urinary source.     #Sepsis, concern for urinary source (fever, leukocytosis)  #History of recurrent UTIs for >5 years  #Transaminitis  #Hx of liver transplant due to primary biliary cirrhosis in 2002  -Sees Dr. Lundberg from infectious disease as outpatient has an appointment with Dr. Gtuierrez of Urology for 3/19.   -Prior to admission, temp was 102.2 deg F per her report. Reports being told by Dr. Lundberg that if she has a low-grade fever to monitor but if she spikes a higher fever to come to the ED.  -In the ED, denied any symptoms including dysuria. temp was 100.6 deg F, BP/HR/RR and SpO2 adequate.  lactic 2.2 to 1, WBC 18.9, AST/ALT 61/58 (baseline in 20s). UA was orange, cloudy, protein 50, nitrite positive, small blood, large leukocyte esterase, >182 WBC, 13 RBC, moderate bacteria, present WBC clumps, and 6 squam epi. Respiratory panel, COVID, Flu A/B, and RSV negative. MRSA negative. CXR showed no acute issues, maybe some chronic streaky R basilar opacities. CT ab/pelvis (no contrast per patient's request due to CKD and previous history of DERREK from contrast) showed hypodensity on inferior liver (possible cholangitis?), fluid subcentimeter density in lesion of pancreatic head, and diverticulosis and other similar findings compared to CT  "from 12/2024. S/p meropenem 1 g IV x1 and Vanc x1 (along with scheduled) 500 ml NS bolus x2 in ED.   Blood cultures and Urine culture pending  Repeat CBC and CMP in AM  STOPPED Vanc  Merrem 1 g IV q12h (adjusted for CKD, GFR ~34)  PTA prednisone 10 mg po daily, sirolimus 1 mg po daily, and ursodiol 250 mg po BID   Transplant ID to formally see 3/9 - agree with Merrem. Appreciate recs in especially in context of numerous reactions noted in chart  Hepatology consulted for anti-rejection medication co-management, appreciate help and also input for transaminitis (MRCP vs consult for ERCP?) - liver function intact otherwise       Chronic conditions  #CKD stage 3b   -Nephro appointment on 4/2. Was ~1.56 on admission. Baseline creatinine appears to be approximately 1.4-1.6.       #HTN  #Hx of paroxysmal atrial fibrillation  #HLD  -PTA Hydralazine 25 mg po TID (new med, replaced amlodipine), Toprol 25 mg po daily, Eliquis 5 mg po BID, and Ezetimibe 10 mg po daily     #DM type 2 c/b diabetic peripheral neuropathy  -Last A1c 6.9 on 12/10/2024  -PTA linagliptin 5 mg po daily   -PTA gabapentin 300 mg po at bedtime     #Hypothyroidism  -Last TSH 1/20/2025 1.53  -PTA Synthroid 175 mcg po daily    #SCC of Left Cheek  -Appointment with Dr. Mya of Derm 4/1 for Moh's excision          Diet: High Consistent Carb (75 g CHO per Meal) Diet    DVT Prophylaxis: DOAC  Ron Catheter: Not present  Lines: None     Cardiac Monitoring: None  Code Status: Full Code      Clinically Significant Risk Factors Present on Admission                # Drug Induced Coagulation Defect: home medication list includes an anticoagulant medication    # Hypertension: Noted on problem list      # Anemia: based on hgb <11      # DMII: A1C = 6.9 % (Ref range: <5.7 %) within past 6 months    # Overweight: Estimated body mass index is 26.23 kg/m  as calculated from the following:    Height as of 2/14/25: 1.715 m (5' 7.5\").    Weight as of 2/12/25: 77.1 kg (170 " lb).       # Financial/Environmental Concerns:           Disposition Plan     Medically Ready for Discharge: Anticipated in 2-4 Days           Madhu Quiñones MD  Hospitalist Service, [facility]  Securely message with [employer] (more info)  Text page via [facility] Paging/Directory   See signed in provider for up to date coverage information    ______________________________________________________________________    Chief Complaint   Fever, concern for UTI    History is obtained from the patient    History of Present Illness   Luz Thompson is a 74 year old female admitted on 3/8/2025. She medical history is significant for primary biliary cirrhosis s/p liver transplant (2002) (on sirolimus, prednisone 10 mg daily and ursodiol), paroxysmal atrial fibrillation on Eliquis, history of CVA, CKD IIIb, diabetes mellitus type 2 not on insulin, hypothyroidism, hypertension, and PAD.  She presented to the ED on 3/8 due to chills and measuring a fever of 102 deg F at home. She was admitted on 3/8 due to concern for sepsis related to urinary source.     Sees Dr. Lundberg from infectious disease as outpatient has an appointment with Dr. Gutierrez of Urology for 3/19 due to history of recurrent UTIs for >5 years.    She was at home and developed chills this morning. She measured her temp and reports it was 102.2 deg F per her report. She states that Dr. Lundberg told her if she has a low-grade fever, to stay at home and monitor. But if she were to spike a higher fever, she was instructed to come to the ED.    In the ED, denied any symptoms including dysuria. temp was 100.6 deg F, BP/HR/RR and SpO2 adequate.  lactic 2.2 to 1, WBC 18.9, AST/ALT 61/58 (baseline in 20s). UA was orange, cloudy, protein 50, nitrite positive, small blood, large leukocyte esterase, >182 WBC, 13 RBC, moderate bacteria, present WBC clumps, and 6 squam epi. Respiratory panel, COVID, Flu A/B, and RSV  "negative. MRSA negative. CXR showed no acute issues, maybe some chronic streaky R basilar opacities. CT ab/pelvis (no contrast per patient's request due to CKD and previous history of DERREK from contrast) showed hypodensity on inferior liver (possible cholangitis?), fluid subcentimeter density in lesion of pancreatic head, and diverticulosis and other similar findings compared to CT from 12/2024. S/p meropenem 1 g IV x1 and Vanc x1 (along with scheduled) 500 ml NS bolus x2 in ED.       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 Gentry Ramirez, 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       Past Surgical History   Past Surgical History:   Procedure Laterality " Date    APPENDECTOMY  1961    CATARACT IOL, RT/LT      RE12/19/2013, LE12/10/2013 - Toric lenses    CHOLECYSTECTOMY  1991    COLONOSCOPY  03/10/2014    Procedure: COLONOSCOPY;;  Surgeon: Gentry Ramirez MD;  Location: [facility]    CYSTOSCOPY      ear drum repair      ENDOBRONCHIAL ULTRASOUND FLEXIBLE N/A 09/29/2017    Procedure: ENDOBRONCHIAL ULTRASOUND FLEXIBLE;  Flexible Bronchoscopy, Endobronchial Ultrasound, Transbronchial Needle Aspiration ;  Surgeon: Eden Clinton MD;  Location: [facility] OR    ENDOSCOPIC RETROGRADE CHOLANGIOPANCREATOGRAM  09/19/2013    Procedure: ENDOSCOPIC RETROGRADE CHOLANGIOPANCREATOGRAM;  Endoscopic Retrograde Cholangiopancreatogram with single balloon enteroscopy, ballon sweep of bile duct;  Surgeon: Brett Membreno MD;  Location: [facility] OR     KNEE SCOPE,MED/LAT MENISECTOMY Right 08/10/2012    partial medial menisectomy only    KNEE SURGERY  1966    R knee    PICC INSERTION  09/18/2013    4fr SL PASV PICC, 40cm (1cm external) in the R basilic vein w/ tip in the low SVC    PICC INSERTION  02/21/2014    5 fr DL BioFlo Navilyst PICC, 46 cm (3 cm external) in the L basilic vein w/ tip in the SVC RA junction.    THYROIDECTOMY  03/2010    TRANSPLANT LIVER RECIPIENT LIVING UNRELATED  05/2002       Prior to Admission Medications   Prior to Admission Medications   Prescriptions Last Dose Informant Patient Reported? Taking?   Continuous Glucose Sensor (FREESTYLE NINO 2 SENSOR) MISC   No No   Sig: Change every 14 days.   D-MANNOSE PO   Yes No   Sig: Take 1 Scoop by mouth 2 times daily.   EPINEPHrine (ANY BX GENERIC EQUIV) 0.3 MG/0.3ML injection 2-pack   No No   Sig: Inject 0.3 mLs (0.3 mg) into the muscle as needed for anaphylaxis. May repeat one time in 5-15 minutes if response to initial dose is inadequate.   Ferrous Sulfate 324 MG TBEC   Yes No   Sig: Take 1 tablet by mouth 2 times daily as needed.   Multiple Vitamins-Minerals (PRESERVISION AREDS 2) CAPS   No No   Sig: Take 1 capsule by mouth 2 times  daily   Nutrisource Fiber PO packet   Yes No   Sig: Take 1 packet by mouth daily.   amLODIPine (NORVASC) 5 MG tablet   Yes No   Sig: Take 1 tablet (5 mg) by mouth daily.   apixaban ANTICOAGULANT (ELIQUIS) 5 MG tablet   No No   Sig: Take 1 tablet (5 mg) by mouth 2 times daily.   calcitRIOL (ROCALTROL) 0.25 MCG capsule   No No   Sig: Take 1 capsule (0.25 mcg) by mouth daily.   estradiol (ESTRACE) 0.1 MG/GM vaginal cream   Yes No   Sig: Place vaginally every other day.   evolocumab (REPATHA SURECLICK) 140 MG/ML prefilled autoinjector   No No   Sig: Inject 1 mL (140 mg) subcutaneously every 14 days. . Please have fasting labs drawn for further refills.   ezetimibe (ZETIA) 10 MG tablet   No No   Sig: Take 1 tablet (10 mg) by mouth daily.   folic acid (FOLVITE) 1 MG tablet   No No   Sig: TAKE 1 TABLET BY MOUTH DAILY   gabapentin (NEURONTIN) 250 MG/5ML solution   No No   Sig: Take 6 mLs (300 mg) by mouth at bedtime   glucose (BD GLUCOSE) 5 g chewable tablet   No No   Sig: Take 2 tablets (10 g) by mouth as needed (low blood sugar)   hydrALAZINE (APRESOLINE) 25 MG tablet   Yes No   Sig: Take 1 tablet (25 mg) by mouth 3 times daily.   hypromellose (ARTIFICIAL TEARS) 0.4 % SOLN ophthalmic solution   No No   Sig: Apply 1 drop to eye every hour as needed for dry eyes   ketoconazole (NIZORAL) 2 % external cream   No No   Sig: Apply topically 2 times daily as needed (redness and flaking). Apply to the face.   ketoconazole (NIZORAL) 2 % external shampoo   No No   Sig: Apply topically three times a week. Lather in the shower, wait 3-5 minutes before rinsing.   levothyroxine (SYNTHROID/LEVOTHROID) 175 MCG tablet   No No   Sig: Take 1 tablet (175 mcg) by mouth daily.   linagliptin (TRADJENTA) 5 MG TABS tablet   No No   Sig: Take 1 tablet (5 mg) by mouth daily.   meclizine (ANTIVERT) 25 MG tablet   No No   Sig: Take 1 tablet (25 mg) by mouth as needed for dizziness or other (migraines)   metoprolol succinate ER (TOPROL XL) 25 MG 24 hr  "tablet   Yes No   Sig: Take 1 tablet (25 mg) by mouth daily. Take an extra tablet daily as needed for breakthrough afib. May repeat in two hours if heart rate remains >100bpm (no more than 4 tablets per day).   omega-3 acid ethyl esters (LOVAZA) 1 g capsule   No No   Sig: Take 1 capsule by mouth 2 times daily.   predniSONE (DELTASONE) 10 MG tablet   No No   Sig: Take 1 tablet (10 mg) by mouth daily.   sirolimus (GENERIC EQUIVALENT) 1 MG tablet   No No   Sig: Take 1 tablet (1 mg) by mouth daily.   ursodiol (ACTIGALL) 250 MG tablet   No No   Sig: Take 1 tablet (250 mg) by mouth 2 times daily.      Facility-Administered Medications: None        Social History   I have reviewed this patient's social history and updated it with pertinent information if needed.  Social History     Tobacco Use    Smoking status: Former     Current packs/day: 0.00     Average packs/day: 1 pack/day for 18.0 years (18.0 ttl pk-yrs)     Types: Cigarettes     Start date: 1967     Quit date: 1985     Years since quittin.9    Smokeless tobacco: Never   Vaping Use    Vaping status: Never Used   Substance Use Topics    Alcohol use: Yes     Alcohol/week: 0.0 standard drinks of alcohol     Comment: rare - \"I toast at weddings\"    Drug use: No         Family History   I have reviewed this patient's family history and updated it with pertinent information if needed.  Family History   Problem Relation Age of Onset    Hypertension Mother     Endometrial Cancer Mother     Hyperlipidemia Mother     Prostate Cancer Father     Macular Degeneration Father     Cancer - colorectal Maternal Grandmother         in her 80's, has surgery and removal of part of kidney,  at age 98    Heart Disease Maternal Grandfather          at 98    Glaucoma Maternal Grandfather     Cerebrovascular Disease Paternal Grandmother         in her 80's    Hypertension Paternal Grandmother     Heart Disease Paternal Grandfather         MI    Alzheimer Disease " Paternal Grandfather     Allergies Son     Neurologic Disorder Daughter         Migraines    Breast Cancer Other     Anesthesia Reaction No family hx of     Crohn's Disease No family hx of     Ulcerative Colitis No family hx of     Melanoma No family hx of     Skin Cancer No family hx of          Allergies   Allergies   Allergen Reactions    Fluconazole Hives and Itching     Full body hives  **Intradermal skin testing negative**  [See intradermal skin testing results from 8/2/2019]    Mycophenolate Diarrhea and Nausea and Vomiting     Patient stated it was chronic and lasted months      Penicillins Anaphylaxis, Hives, Itching and Rash     **Intradermal skin testing negative**  [See intradermal skin testing results from 8/2/2019]      Simvastatin Muscle Pain (Myalgia)     severe  Other reaction(s): Myalgia caused by statin    Methotrexate Other (See Comments)     Other reaction(s): Sore  Sores in mouth, esophagus, and stomach.       Morphine And Codeine Itching and Other (See Comments)     Psych disturbance  Other reaction(s): Confusion, Mood alteration    Quinolones Anxiety, Dizziness, Headache, Other (See Comments), Palpitations and Unknown     Other reaction(s): Hyperactive behavior, Lightheadedness, Mood alteration    Dizzy, light headed    Dizziness, shaky, and jumpy    Benadryl [Diphenhydramine Hcl]      Insomnia     Capsules, Empty Gelatin [Gelatin]     Lansoprazole Diarrhea    Azithromycin Itching     [See intradermal skin testing results from 8/2/2019]    Bactrim [Sulfamethoxazole-Trimethoprim] Other (See Comments)     Numb mouth, tingling lips (treated with anti-histamines)    Cephalosporins Itching     [See intradermal skin testing results from 8/2/2019]    Ciprofloxacin Hcl Other (See Comments) and Dizziness     Insomnia, mood lability, Irregular heart beat         Doxycycline Itching and Unknown     [See intradermal skin testing results from 8/2/2019]    Lisinopril Cough    Omeprazole Itching    Tolectin  [Nsaids] Rash    Tolmetin Rash and Itching    Tramadol Rash, Hives and Itching        Physical Exam   Vital Signs: Temp: 99.3  F (37.4  C) Temp src: Oral BP: (!) 148/68 Pulse: 95   Resp: 16 SpO2: 100 % O2 Device: None (Room air)    Weight: 0 lbs 0 oz    General Appearance: Well, not in acute distress, pleasant  Respiratory: on room air, equal breath sounds bilaterally, no cough  Cardiovascular: S1 and S2 auscultated. No edema. Perfusing. Equal pulses bilaterally in upper and lower extremities  GI: Non-tender to palpation. Normal bowel sounds noted.   Skin: L cheek SCC lesion. Some scattered bruising.  Neuro/Psych: Alert and oriented x4, normal mood and affect.    Medical Decision Making       100 MINUTES SPENT BY ME on the date of service doing chart review, history, exam, documentation & further activities per the note.      Data     I have personally reviewed the following data over the past 24 hrs:    18.9 (H)  \   10.8 (L)   / 346     138 103 51.3 (H) /  183 (H)   4.4 24 1.56 (H) \     ALT: 61 (H) AST: 58 (H) AP: 110 TBILI: 0.5   ALB: 3.5 TOT PROTEIN: 6.5 LIPASE: N/A     Trop: 30 (H) BNP: 963 (H)     Procal: 0.11 CRP: N/A Lactic Acid: 1.0         Imaging results reviewed over the past 24 hrs:   Recent Results (from the past 24 hours)   XR Chest 1 View    Narrative    Exam: XR CHEST 1 VIEW, 3/8/2025 4:35 PM    Comparison: 1/19/2025    History: Fever    Findings:    Single AP portable chest.    Trachea is midline. Stable cardiac mediastinal silhouette. Similar  metallic opacity/device projecting over the left chestThere is no  discernible pneumothorax or pleural effusion. streaky right basilar  opacities      Impression    Impression: Streaky right basilar opacities, may represent  atelectasis, scarring or infiltrate in the appropriate clinical  settings.    I have personally reviewed the examination and initial interpretation  and I agree with the findings.    ROSENDO HIRSCH MD         SYSTEM ID:  [id number]   CT  Abdomen Pelvis w/o Contrast    Narrative    EXAMINATION: CT ABDOMEN PELVIS W/O CONTRAST, 3/8/2025 6:38 PM    INDICATION: History liver transplant, fever, mildly elevated LFTs  evaluate cholangitis patient declining contrast    COMPARISON STUDY: CT chest and pelvis 1/22/2025    TECHNIQUE: CT scan of the abdomen and pelvis was performed on  multidetector CT scanner using volumetric acquisition technique and  images were reconstructed in multiple planes with variable thickness  and reviewed on dedicated workstations.     CONTRAST: None    CT scan radiation dose is optimized to minimum requisite dose using  automated dose modulation techniques.    FINDINGS:      Similar right lung base calcifications. No pleural effusion. No  pericardial effusion.    Limited evaluation of abdominal parenchymal organs due to noncontrast  technique.     Postsurgical changes of liver transplant. Curvilinear hyperdensity  seen along the inferior right lobe (3/67 and 4/42, may represent  peripheral biliary ductal dilatation or edema, similar to more  conspicuous compared to prior CT from 1/22/2025 . Gallbladder is  surgically absent.      Unremarkable noncontrast appearance of the adrenal glands, spleen  atrophic appearing pancreas. Similar likely pancreatic cyst measuring  1.4 cm, stable. No pancreatic ductal dilatation. Scattered osseous  sclerotic calcification of the abdominal aorta without aneurysmal  dilatation. Colonic diverticulosis without evidence for  diverticulitis. Small fat-containing ventral hernia.  No high-grade bowel obstruction. No ascites. Similar vascular  calcification/nonobstructive renal calculi. No new renal mass or  hydronephrosis.. No pelvic mass.          Impression    IMPRESSION:       1. Postsurgical changes of liver transplant, subtle curvilinear  hypodensity in the inferior liver (4/42 and 3/67 may represent  peripheral biliary ductal dilatation or edema, similar to slightly  more conspicuous compared to prior  "CT scan; if persistent clinical  concern for cholangitis, consider gastroenterology consult and/or  MRCP.  2. Fluid density subcentimeter lesion in the pancreatic head region,  may represent pancreatic cysts/IPMN, consider follow-up is suggested  below..  3. Diverticulosis without definite diverticulitis  4. Additional findings similar to prior CT from 12/10/2024    International evidence-based [place] guidelines for the management of  intraductal papillary mucinous neoplasm of the pancreas:   Surveillance is recommended if no high risk stigmata or worrisome  features are present:  Primary imaging modalities recommended are MRI/MRCP and MDCT  Size of largest cyst:   *  Less than 20 mm: Follow-up imaging in 6 months once, then every 18  months if no change. Stop surveillance if stable for 5 years.  *  More than 20 mm but less than 30 mm: Follow-up imaging at 6 months,  12 months, then yearly if no change.   *  More than 30 mm- follow up imaging every 6 months.    GI consultation for surgery/endoscopic ultrasound is recommended for  cysts with \"high-risk stigmata\" of high grade dysplasia or invasive  carcinoma such as:  1. Obstructive jaundice in a patient with cystic lesion of the head of  the pancreas  2. Enhancing mural nodule > 5mm or solid component  3. Main pancreatic duct >10mm  4. Suspicious or positive results of cytology    If worrisome features are present, GI consultation is recommended.  Worrisome features on imagin. Cyst more than or equal to 30 mm  2. Thickened or enhancing cyst walls  3. Main pancreatic duct > 5mm and < 10mm.  4. Abrupt change in caliber of pancreatic duct with distal atrophy  5. Lymphadenopathy  6. Cyst growth rate > 2.5mm/year    *Reference: International evidence-based [place] guidelines for the  management of  intraductal papillary mucinous neoplasm of the pancreas Pancreatology:  24:(); 255-270.  [email or web address]://[email or web address]/10.1016/j.[email or web address]     "

## 2025-03-09 NOTE — CONSULTS
Care Management Initial Consult    General Information  Assessment completed with: Luz Holder  Type of CM/SW Visit: Initial Assessment  Primary Care Provider verified and updated as needed: Yes   Readmission within the last 30 days: no previous admission in last 30 days      Reason for Consult: discharge planning  Advance Care Planning: Advance Care Planning Reviewed: present on chart, no concerns identified          Communication Assessment  Patient's communication style: spoken language (English or Bilingual)         Cognitive  Cognitive/Neuro/Behavioral: WDL                      Living Environment:   People in home: alone     Current living Arrangements: independent living facility      Able to return to prior arrangements: yes    Family/Social Support:  Care provided by: self  Provides care for: no one  Marital Status:   Support system: Children, Other (specify) (grandchildren)          Description of Support System: Supportive, Involved    Support Assessment: Adequate family and caregiver support, Patient communicates needs well met    Current Resources:   Patient receiving home care services: Yes  Skilled Home Care Services: Physical Therapy, Occupational Therapy  Community Resources: DME, Home Care, Housekeeping/Chore Agency  Equipment currently used at home: cane, straight, grab bar, toilet, grab bar, tub/shower, walker, rolling, other (see comments) (toilet riser, light weight walker, stand up walker)  Supplies currently used at home: Other, Nutritional Supplements, Diabetic Supplies, Incontinence Supplies (Protein shakes; Neil II for DM2)    Employment/Financial:  Employment Status: retired     Financial Concerns: none   Referral to Financial Worker: No    Does the patient's insurance plan have a 3 day qualifying hospital stay waiver?  No    Lifestyle & Psychosocial Needs:  Social Drivers of Health     Food Insecurity: Low Risk  (1/27/2025)    Food Insecurity     Within the past 12 months,  did you worry that your food would run out before you got money to buy more?: No     Within the past 12 months, did the food you bought just not last and you didn t have money to get more?: No   Depression: Not at risk (2/12/2025)    PHQ-2     PHQ-2 Score: 0   Housing Stability: Low Risk  (1/27/2025)    Housing Stability     Do you have housing? : Yes     Are you worried about losing your housing?: No   Recent Concern: Housing Stability - High Risk (1/22/2025)    Housing Stability     Do you have housing? : No     Are you worried about losing your housing?: No   Tobacco Use: Medium Risk (2/12/2025)    Patient History     Smoking Tobacco Use: Former     Smokeless Tobacco Use: Never     Passive Exposure: Not on file   Financial Resource Strain: Low Risk  (1/27/2025)    Financial Resource Strain     Within the past 12 months, have you or your family members you live with been unable to get utilities (heat, electricity) when it was really needed?: No   Alcohol Use: Not At Risk (11/9/2023)    Received from [facility]    AUDIT-C     Frequency of Alcohol Consumption: Never     Average Number of Drinks: Patient does not drink     Frequency of Binge Drinking: Never   Transportation Needs: High Risk (1/27/2025)    Transportation Needs     Within the past 12 months, has lack of transportation kept you from medical appointments, getting your medicines, non-medical meetings or appointments, work, or from getting things that you need?: Yes   Physical Activity: Insufficiently Active (11/9/2023)    Received from [facility]    Exercise Vital Sign     Days of Exercise per Week: 5 days     Minutes of Exercise per Session: 10 min   Interpersonal Safety: Low Risk  (1/22/2025)    Interpersonal Safety     Do you feel physically and emotionally safe where you currently live?: Yes     Within the past 12 months, have you been hit, slapped, kicked or otherwise physically hurt by someone?: No     Within the past 12 months, have you been  humiliated or emotionally abused in other ways by your partner or ex-partner?: No   Stress: No Stress Concern Present (11/9/2023)    Received from [facility]    [employer] [facility] [facility] [employer] [facility] - Occupational Stress Questionnaire     Feeling of Stress : Only a little   Social Connections: Feeling Socially Integrated (12/20/2023)    Received from [facility]    OASIS : Social Isolation     Frequency of experiencing loneliness or isolation: Never   Health Literacy: Not on file       Functional Status:  Prior to admission patient needed assistance:   Dependent ADLs:: Ambulation-cane, Ambulation-walker, Incontinence  Dependent IADLs:: Cleaning, Cooking, Laundry  Assesssment of Functional Status: Not at  functional baseline    Mental Health Status:  Mental Health Status: No Current Concerns       Chemical Dependency Status:  Chemical Dependency Status: No Current Concerns             Values/Beliefs:  Spiritual, Cultural Beliefs, Spiritism Practices, Values that affect care: no               Discussed  Partnership in Safe Discharge Planning  document with patient/family: No    Additional Information:    Care Management Assessment completed with patient at bedside due to elevated risk score.    Patient lives alone at [facility], an Independent Living Facility. Patient is mostly independent with her I/ADLs, however states her granddaughter cleans her apartment and some of her meals are provided by the facility. Patient continues to drive. She has a lightweight walker she keeps in her car and a standup walker at home to get around the building.     Patient reports being well supported by her children. 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    Patient is open with home PT and OT through [facility]. She states she has one week left of services but would like to continue. NADEEM relayed we can send resumption of care orders for her at discharge. NADEEM  attempted to confirm with [facility] that patient was open with their services and get a fax number, but was unable to reach anyone this weekend.    Patient's son or daughter will provide her with transportation home at discharge.     Discharge resources:    [facility]  P: [phone]    Next Steps:   - send resumption of care orders to [facility]  - VINOD Matthew, MSW   3/9/2025       Social Work and Care Management Department       SEARCHABLE in [facility] - search SOCIAL WORK       [place] (0800 - 1630) Saturday and Sunday     Units: 4A Vocera, 4C Vocera, & 4E Vocera        Units: 5A 9627-6223 Vocera, 5A [id number] Vocera , BMT SW 1 BMT SW 2, BMT SW 3 & BMT SW 4  5C Off Service 5401 - 5416  5C Off Service [id number]     Units: 6A Vocera & 6B Vocera      Units: 6C Vocera     Units: 7A Vocera & 7B Vocera      Units: 7C Med Surg 7401 thru 7418 and 7C Med Surg 7502 thru 7521      Unit: [place] ED Vocera & [place] Obs Vocera     [facility] [place] ([id number]) Saturday and Sunday      Units: 5 Ortho Vocera, 5 Med Surg Vocera & WB ED Vocera     Units: 6 Med Surg Vocera, 8 Med Surg Vocera, & 10 ICU Vocera      After hours Vocera [facility] [place] and After Hours Vocera [place]     Please NOTE changes to times below:    **Saturday & Sunday (1630 - 2030)    **Mon-Fri (7126-2964)     **[facility] Recognized Holidays  (4730-7698)    Units: ALL   - see above VOCERA links to units

## 2025-03-09 NOTE — PROGRESS NOTES
"CLINICAL NUTRITION SERVICES - ASSESSMENT NOTE      Malnutrition Status:    Patient does not meet two of the established criteria necessary for diagnosing malnutrition    Registered Dietitian Interventions:  None at this time.      Future/Additional Recommendations:  RD to sign off, please re consult if needs arise       REASON FOR ASSESSMENT  Provider order - Malnutrition concerns    medical history is significant for primary biliary cirrhosis s/p liver transplant (2002), paroxysmal atrial fibrillation, history of CVA, CKD IIIb, diabetes mellitus type 2 not on insulin, hypothyroidism, hypertension, and PAD.  She presented to the ED on 3/8 due to chills and measuring a fever of 102 deg F at home. She was admitted on 3/8 due to concern for sepsis related to urinary source.     SUBJECTIVE INFORMATION  Assessed patient in room.    NUTRITION HISTORY  Recent admission to [facility] 1/2025 - no nutrition concerns per RD brief note.  Pt seen in ED.  Patient reports good appetite / intake \"eating is never a problem for me\". Taking home supply of fiber (mixes with coffee) and 100% breakfast consumed per observation (omelet).  Takes \"preservision\" vitamins at home (not found on formulary).  Visit cut short by patient receiving a phone call    CURRENT NUTRITION ORDERS  Diet: High Consistent Carbohydrate    CURRENT INTAKE/TOLERANCE  100 % breakfast 3/9 (first meal this hospitalization)    LABS  Nutrition-relevant labs:   BUN 51.3 (H)  Cr 1.56 (H) - baseline 1.4 to 1.6 with CKDIIIb per H&P  Last A1c 12/10/24 6.9 (H)    MEDICATIONS  Nutrition-relevant medications:   Calcitriol  Folic acid 1000 mcg  Linagliptin 5 mg  Prednisone 10 mg  Ursodiol  Sirolimus    ANTHROPOMETRICS  Height: 0 cm (Data Unavailable) 5' 7.5\" (171.5 cm)  Most Recent Weight: 77.1 kg - 3 weeks ago  IBW: 62 kg  % IBW: 124%  BMI (kg/m ): Overweight BMI 25-29.9 (26.2 kg/m2)  Weight History: no weight changes reported.    Dosing Weight: 66 kg, based on adjusted wt using " "recorded weight of 77 kg and IBW of 62 kg    ASSESSED NUTRITION NEEDS  Estimated Energy Needs: 1550 - 1860 kcals/day (25 - 30 kcals/kg)  Justification: Maintenance  Estimated Protein Needs: 62 - 74 grams protein/day (1 - 1.2 grams of pro/kg)  Justification: Hypercatabolism with acute illness  Estimated Fluid Needs: 1 mL/kcal  Justification: Maintenance    SYSTEM FINDINGS    Skin/wounds: None  GI symptoms: no BM documented    MALNUTRITION  % Intake: No decreased intake noted  % Weight Loss: None noted  Subcutaneous Fat Loss: None observed  Muscle Loss: None observed  Fluid Accumulation/Edema: Does not meet criteria  Malnutrition Diagnosis: Patient does not meet two of the established criteria necessary for diagnosing malnutrition  Malnutrition Present on Admission: No    NUTRITION DIAGNOSIS  No nutrition diagnosis at this time     INTERVENTIONS  none    Goals  Patient to consume % of nutritionally adequate meal trays TID, or the equivalent with supplements/snacks.     Monitoring/Evaluation  RD to sign off.  Will reevaluate q7 to 10 days per policyunless consulted    Chantal Herr, RD, LD, CNSC  Weekend/Holiday RD - \"Weekend Clinical Dietitian\" on vocera    "

## 2025-03-09 NOTE — CONSULTS
GASTROENTEROLOGY CONSULTATION      Date of Admission:  3/8/2025           Reason for Consultation:   We were asked  to evaluate this patient with IS management           ASSESSMENT AND RECOMMENDATIONS:   Assessment:  Luz Thompson is a 74 year old female admitted on 3/8/2025. She medical history is significant for primary biliary cirrhosis s/p liver transplant (2002) (on sirolimus, prednisone 10 mg daily and ursodiol), paroxysmal atrial fibrillation on Eliquis, history of CVA, CKD IIIb, diabetes mellitus type 2 not on insulin, hypothyroidism, hypertension, and PAD who presented to the hospital for fever of 102  F along with chills.    # History of PBC s/p Ltxp 2002  # History of recurrent UTI  # CKD 3B, diabetes mellitus  # Urinary tract infection infection  # Elevated transaminases     Patient presented with fever and chills along with UA consistent with UTI.  She has systemic symptoms as manifested by fever and leukocytosis.    CTAP prelim read: Postsurgical changes of liver transplant, subtle curvilinear  hypodensity in the inferior liver may represent peripheral biliary ductal dilatation or edema.    Similar likely pancreatic cyst measuring 1.4 cm, stable. No pancreatic ductal dilatation.     Initial LFTs: AST 61, ALT 58, TB 0.5  Bcx: GPC  Ucx: Klebsiella    Recommendations:  -monitor LFTs  -Liver transplant US  -Will follow final read of CT, if concern for biliary obstruction would consider MRCP but clinically does not appear c/w cholangitis or biliary obstruction  -Can follow up MRI for pancreas lesion in 1 year  -Immunosuppression can continue at current dose    Gastroenterology outpatient follow up recommendations: TBD    Thank you for involving us in this patient's care. Please do not hesitate to contact the GI service with any questions or concerns.     Pt care plan discussed with Dr. Bradley, GI staff physician.    Darren Bender MD    Physician Attestation   I saw this patient with the resident  and agree with the resident/fellow's findings and plan of care as documented in the note.        Please see A&P for additional details of medical decision making.    I have personally reviewed the following data over the past 24 hrs:    15.1 (H)  \   10.5 (L)   / 323     141 108 (H) 38.5 (H) /  217 (H)   4.0 20 (L) 1.34 (H) \     ALT: 84 (H) AST: 46 (H) AP: 102 TBILI: 0.4   ALB: 3.2 (L) TOT PROTEIN: 6.1 (L) LIPASE: N/A     Trop: N/A BNP: N/A         Trisha Bradley MD  Date of Service (when I saw the patient): 03/09/25    -------------------------------------------------------------------------------------------------------------------           History of Present Illness:   Luz Thompson is a 74 year old female admitted on 3/8/2025. She medical history is significant for primary biliary cirrhosis s/p liver transplant (2002) (on sirolimus, prednisone 10 mg daily and ursodiol), paroxysmal atrial fibrillation on Eliquis, history of CVA, CKD IIIb, diabetes mellitus type 2 not on insulin, hypothyroidism, hypertension, and PAD who presented to the hospital for fever of 102  F along with chills.     Patient follows up with urology due to history of recurrent UTIs for more than 5 years.  In the emergency room she was still febrile, though not hypotensive or tachycardic initial labs showed lactate of 2.2, white cell count 18.9, AST 61, ALT 58 her urinalysis was cloudy urine with protein 50, nitrate positive, large leukocyte esterase, more than 182 WBC, moderate bacteria.    CTAP was performed without contrast which showed Postsurgical changes of liver transplant, subtle curvilinear hypodensity in the inferior liver (4/42 and 3/67 may represent  peripheral biliary ductal dilatation or edema.  Hepatology service was consulted for immunosuppression management and to review imaging findings.         Data:   Key relevant labs:     Key relevant imaging:           Previous Endoscopy:   Colonoscopy 2017: - Preparation of the  colon was fair.                        - Diverticulosis in the sigmoid colon.                        - No specimens collected.                        - Rectal perforation upon retroflexion treated                        successfully with 3 clips.          Medications:     Current Facility-Administered Medications   Medication Dose Route Frequency Provider Last Rate Last Admin    acetaminophen (TYLENOL) tablet 650 mg  650 mg Oral Q4H PRN Madhu Quiñones MD        Or    acetaminophen (TYLENOL) Suppository 650 mg  650 mg Rectal Q4H PRN Madhu Quiñones MD        amLODIPine (NORVASC) tablet 5 mg  5 mg Oral Daily Madhu Quiñones MD        apixaban ANTICOAGULANT (ELIQUIS) tablet 5 mg  5 mg Oral BID Madhu Quiñones MD   5 mg at 03/08/25 2101    calcitRIOL (ROCALTROL) capsule 0.25 mcg  0.25 mcg Oral Daily Madhu Quiñones MD        ezetimibe (ZETIA) tablet 10 mg  10 mg Oral Daily Madhu Quiñones MD        folic acid (FOLVITE) tablet 1,000 mcg  1,000 mcg Oral Daily Madhu Quiñones MD        gabapentin (NEURONTIN) solution 300 mg  300 mg Oral At Bedtime Madhu Quiñones MD   300 mg at 03/08/25 2129    hydrALAZINE (APRESOLINE) tablet 25 mg  25 mg Oral TID Madhu Quiñones MD   25 mg at 03/08/25 2129    ketoconazole (NIZORAL) 2 % cream   Topical BID PRN Madhu Quiñones MD        [START ON 3/10/2025] ketoconazole (NIZORAL) 2 % shampoo   Topical Once per day on Monday Wednesday Friday Madhu Quiñones MD        levothyroxine (SYNTHROID/LEVOTHROID) tablet 175 mcg  175 mcg Oral Daily Madhu Quiñones MD        lidocaine (LMX4) cream   Topical Q1H PRN Madhu Quiñones MD        lidocaine 1 % 0.1-1 mL  0.1-1 mL Other Q1H PRN Madhu Quiñones MD        linagliptin (TRADJENTA) tablet 5 mg  5 mg Oral Daily Madhu Quiñones MD        meclizine (ANTIVERT) tablet 25 mg  25 mg Oral TID PRN Madhu Quiñones MD        meropenem (MERREM) 1 g vial to attach to  mL bag  1 g Intravenous Q12H Madhu Quiñones MD   1 g at 03/09/25 0619     metoprolol succinate ER (TOPROL XL) 24 hr tablet 25 mg  25 mg Oral Daily Madhu Quiñones MD        ondansetron (ZOFRAN ODT) ODT tab 4 mg  4 mg Oral Q6H PRN Madhu Quiñones MD        Patient is already receiving anticoagulation with heparin, enoxaparin (LOVENOX), warfarin (COUMADIN)  or other anticoagulant medication   Does not apply Continuous PRN Madhu Quiñones MD        predniSONE (DELTASONE) tablet 10 mg  10 mg Oral Daily Madhu Quiñones MD        senna-docusate (SENOKOT-S/PERICOLACE) 8.6-50 MG per tablet 1 tablet  1 tablet Oral BID PRN Madhu Quiñones MD        Or    senna-docusate (SENOKOT-S/PERICOLACE) 8.6-50 MG per tablet 2 tablet  2 tablet Oral BID PRN Madhu Quiñones MD        sirolimus (GENERIC EQUIVALENT) tablet 1 mg  1 mg Oral Daily Madhu Quiñones MD        sodium chloride (PF) 0.9% PF flush 3 mL  3 mL Intracatheter Q8H Madhu Quiñones MD        sodium chloride (PF) 0.9% PF flush 3 mL  3 mL Intracatheter q1 min prn Madhu Quiñones MD        ursodiol (ACTIGALL) tablet 250 mg  250 mg Oral BID Madhu Quiñones MD   250 mg at 03/08/25 2129     Current Outpatient Medications   Medication Sig Dispense Refill    apixaban ANTICOAGULANT (ELIQUIS) 5 MG tablet Take 1 tablet (5 mg) by mouth 2 times daily. 180 tablet 1    calcitRIOL (ROCALTROL) 0.25 MCG capsule Take 1 capsule (0.25 mcg) by mouth daily. 90 capsule 1    Continuous Glucose Sensor (FREESTYLE NINO 2 SENSOR) MISC Change every 14 days. 2 each 5    D-MANNOSE PO Take 1 Scoop by mouth 2 times daily.      EPINEPHrine (ANY BX GENERIC EQUIV) 0.3 MG/0.3ML injection 2-pack Inject 0.3 mLs (0.3 mg) into the muscle as needed for anaphylaxis. May repeat one time in 5-15 minutes if response to initial dose is inadequate. 2 each 1    estradiol (ESTRACE) 0.1 MG/GM vaginal cream Place vaginally every other day.      evolocumab (REPATHA SURECLICK) 140 MG/ML prefilled autoinjector Inject 1 mL (140 mg) subcutaneously every 14 days. . Please have fasting labs  drawn for further refills. 6 mL 3    ezetimibe (ZETIA) 10 MG tablet Take 1 tablet (10 mg) by mouth daily. 90 tablet 3    Ferrous Sulfate 324 MG TBEC Take 1 tablet by mouth 2 times daily as needed.      folic acid (FOLVITE) 1 MG tablet TAKE 1 TABLET BY MOUTH DAILY 90 tablet 3    gabapentin (NEURONTIN) 250 MG/5ML solution Take 6 mLs (300 mg) by mouth at bedtime 180 mL 0    glucose (BD GLUCOSE) 5 g chewable tablet Take 2 tablets (10 g) by mouth as needed (low blood sugar) 40 tablet 1    hydrALAZINE (APRESOLINE) 25 MG tablet Take 1 tablet (25 mg) by mouth 3 times daily.      ketoconazole (NIZORAL) 2 % external cream Apply topically 2 times daily as needed (redness and flaking). Apply to the face. 30 g 3    ketoconazole (NIZORAL) 2 % external shampoo Apply topically three times a week. Lather in the shower, wait 3-5 minutes before rinsing. 100 mL 11    levothyroxine (SYNTHROID/LEVOTHROID) 175 MCG tablet Take 1 tablet (175 mcg) by mouth daily. 90 tablet 1    linagliptin (TRADJENTA) 5 MG TABS tablet Take 1 tablet (5 mg) by mouth daily. 90 tablet 1    meclizine (ANTIVERT) 25 MG tablet Take 1 tablet (25 mg) by mouth as needed for dizziness or other (migraines) 30 tablet 11    metoprolol succinate ER (TOPROL XL) 25 MG 24 hr tablet Take 1 tablet (25 mg) by mouth daily. Take an extra tablet daily as needed for breakthrough afib. May repeat in two hours if heart rate remains >100bpm (no more than 4 tablets per day).      Multiple Vitamins-Minerals (PRESERVISION AREDS 2) CAPS Take 1 capsule by mouth 2 times daily      Nutrisource Fiber PO packet Take 1 packet by mouth daily.      omega-3 acid ethyl esters (LOVAZA) 1 g capsule Take 1 capsule by mouth 2 times daily. 180 capsule 1    predniSONE (DELTASONE) 10 MG tablet Take 1 tablet (10 mg) by mouth daily. 90 tablet 3    sirolimus (GENERIC EQUIVALENT) 1 MG tablet Take 1 tablet (1 mg) by mouth daily. 90 tablet 3    ursodiol (ACTIGALL) 250 MG tablet Take 1 tablet (250 mg) by mouth 2  "times daily. 180 tablet 3    amLODIPine (NORVASC) 5 MG tablet Take 1 tablet (5 mg) by mouth daily. (Patient not taking: Reported on 3/9/2025)      hypromellose (ARTIFICIAL TEARS) 0.4 % SOLN ophthalmic solution Apply 1 drop to eye every hour as needed for dry eyes (Patient not taking: Reported on 3/9/2025)               Physical Exam:   /60 (BP Location: Right arm)   Pulse 61   Temp 97.5  F (36.4  C) (Oral)   Resp 16   LMP 06/01/1988 (Approximate)   SpO2 95%   Wt:   Wt Readings from Last 2 Encounters:   02/12/25 77.1 kg (170 lb)   02/06/25 86.2 kg (190 lb)      Constitutional: cooperative, pleasant, not dyspneic/diaphoretic, no acute distress  Eyes: Sclera anicteric/injected  Ears/nose/mouth/throat: Normal oropharynx without ulcers or exudate, mucus membranes moist, hearing intact  Neck: supple, thyroid normal size  CV: No edema  Respiratory: Unlabored breathing  Lymph: No axillary, submandibular, supraclavicular or inguinal lymphadenopathy  Abd:  Nondistended, +bs, no hepatosplenomegaly, nontender, no peritoneal signs  Skin: warm, perfused, no jaundice  Neuro: AAO x 3, No asterixis  Psych: Normal affect  MSK: No gross deformities          Past Medical History:   Reviewed and edited as appropriate  Past Medical History:   Diagnosis Date    Anemia of chronic disease 10/17/2011    Anxiety     CKD (chronic kidney disease) stage 3, GFR 30-59 ml/min (H) 04/04/2012    Coccidioidomycosis, 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     Deep venous thrombosis     Diverticulosis of sigmoid colon 12/21/2013    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, history of     Received Rituxan during Summer of 2016    Glaucoma     H/O esophageal varices     Hearing loss     Hyperlipidemia 04/10/2012    Says that she does not have it anymore, not on meds    " Hypertension     Hypertriglyceridemia     Liver replaced by transplant (H) 10/17/2011    Dr. 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            Past Surgical History:   Reviewed and edited as appropriate   Past Surgical History:   Procedure Laterality Date    APPENDECTOMY  1961    CATARACT IOL, RT/LT      RE12/19/2013, LE12/10/2013 - Toric lenses    CHOLECYSTECTOMY  1991    COLONOSCOPY  03/10/2014    Procedure: COLONOSCOPY;;  Surgeon: Gentry Ramirez MD;  Location: [facility] GI    CYSTOSCOPY      ear drum repair      ENDOBRONCHIAL ULTRASOUND FLEXIBLE N/A 09/29/2017    Procedure: ENDOBRONCHIAL ULTRASOUND FLEXIBLE;  Flexible Bronchoscopy, Endobronchial Ultrasound, Transbronchial Needle Aspiration ;  Surgeon: Eden Clinton MD;  Location: [facility] OR    ENDOSCOPIC RETROGRADE CHOLANGIOPANCREATOGRAM  09/19/2013    Procedure: ENDOSCOPIC RETROGRADE CHOLANGIOPANCREATOGRAM;  Endoscopic Retrograde Cholangiopancreatogram with single balloon enteroscopy, ballon sweep of bile duct;  Surgeon: Brett Membreno MD;  Location:  OR     KNEE SCOPE,MED/LAT MENISECTOMY Right 08/10/2012    partial medial menisectomy only    KNEE SURGERY  1966    R knee    PICC INSERTION  09/18/2013    4fr SL PASV PICC, 40cm (1cm external) in the R basilic vein w/ tip in the low SVC    PICC INSERTION  02/21/2014    5 fr DL BioFlo Navilyst PICC, 46 cm (3 cm external) in the L basilic vein w/ tip in the SVC RA junction.    THYROIDECTOMY  03/2010     TRANSPLANT LIVER RECIPIENT LIVING UNRELATED  2002            Social History:   Alcohol use: not assesed  Smoking history: not assesed  Living situation: not assesed         Family History:   Reviewed and edited as appropriate  Family History   Problem Relation Age of Onset    Hypertension Mother     Endometrial Cancer Mother     Hyperlipidemia Mother     Prostate Cancer Father     Macular Degeneration Father     Cancer - colorectal Maternal Grandmother         in her 80's, has surgery and removal of part of kidney,  at age 98    Heart Disease Maternal Grandfather          at 98    Glaucoma Maternal Grandfather     Cerebrovascular Disease Paternal Grandmother         in her 80's    Hypertension Paternal Grandmother     Heart Disease Paternal Grandfather         MI    Alzheimer Disease Paternal Grandfather     Allergies Son     Neurologic Disorder Daughter         Migraines    Breast Cancer Other     Anesthesia Reaction No family hx of     Crohn's Disease No family hx of     Ulcerative Colitis No family hx of     Melanoma No family hx of     Skin Cancer No family hx of       No known history of colorectal cancer, liver disease, or inflammatory bowel disease.         Allergies:   Reviewed and edited as appropriate     Allergies   Allergen Reactions    Fluconazole Hives and Itching     Full body hives  **Intradermal skin testing negative**  [See intradermal skin testing results from 2019]    Mycophenolate Diarrhea and Nausea and Vomiting     Patient stated it was chronic and lasted months      Penicillins Anaphylaxis, Hives, Itching and Rash     **Intradermal skin testing negative**  [See intradermal skin testing results from 2019]      Simvastatin Muscle Pain (Myalgia)     severe  Other reaction(s): Myalgia caused by statin    Methotrexate Other (See Comments)     Other reaction(s): Sore  Sores in mouth, esophagus, and stomach.       Morphine And Codeine Itching and Other (See Comments)     Psych  disturbance  Other reaction(s): Confusion, Mood alteration    Quinolones Anxiety, Dizziness, Headache, Other (See Comments), Palpitations and Unknown     Other reaction(s): Hyperactive behavior, Lightheadedness, Mood alteration    Dizzy, light headed    Dizziness, shaky, and jumpy    Benadryl [Diphenhydramine Hcl]      Insomnia     Capsules, Empty Gelatin [Gelatin]     Lansoprazole Diarrhea    Azithromycin Itching     [See intradermal skin testing results from 8/2/2019]    Bactrim [Sulfamethoxazole-Trimethoprim] Other (See Comments)     Numb mouth, tingling lips (treated with anti-histamines)    Cephalosporins Itching     [See intradermal skin testing results from 8/2/2019]    Ciprofloxacin Hcl Other (See Comments) and Dizziness     Insomnia, mood lability, Irregular heart beat         Doxycycline Itching and Unknown     [See intradermal skin testing results from 8/2/2019]    Lisinopril Cough    Omeprazole Itching    Tolectin [Nsaids] Rash    Tolmetin Rash and Itching    Tramadol Rash, Hives and Itching              Review of Systems:     A complete 10 point review of systems was performed and is negative except as noted in the HPI

## 2025-03-10 ENCOUNTER — ANCILLARY PROCEDURE (OUTPATIENT)
Dept: CARDIOLOGY | Facility: CLINIC | Age: 75
End: 2025-03-10
Attending: INTERNAL MEDICINE
Payer: MEDICARE

## 2025-03-10 ENCOUNTER — APPOINTMENT (OUTPATIENT)
Dept: ULTRASOUND IMAGING | Facility: CLINIC | Age: 75
DRG: 871 | End: 2025-03-10
Attending: PHYSICIAN ASSISTANT
Payer: MEDICARE

## 2025-03-10 ENCOUNTER — APPOINTMENT (OUTPATIENT)
Dept: CARDIOLOGY | Facility: CLINIC | Age: 75
DRG: 871 | End: 2025-03-10
Attending: PHYSICIAN ASSISTANT
Payer: MEDICARE

## 2025-03-10 ENCOUNTER — PATIENT OUTREACH (OUTPATIENT)
Dept: CARE COORDINATION | Facility: CLINIC | Age: 75
End: 2025-03-10

## 2025-03-10 ENCOUNTER — APPOINTMENT (OUTPATIENT)
Dept: PHYSICAL THERAPY | Facility: CLINIC | Age: 75
DRG: 871 | End: 2025-03-10
Attending: PHYSICIAN ASSISTANT
Payer: MEDICARE

## 2025-03-10 DIAGNOSIS — Z45.09 ENCOUNTER FOR LOOP RECORDER CHECK: ICD-10-CM

## 2025-03-10 DIAGNOSIS — I48.0 PAROXYSMAL ATRIAL FIBRILLATION (H): ICD-10-CM

## 2025-03-10 PROBLEM — N39.0 URINARY TRACT INFECTION: Status: ACTIVE | Noted: 2024-09-22

## 2025-03-10 PROBLEM — H91.90 HARD OF HEARING: Status: ACTIVE | Noted: 2025-03-10

## 2025-03-10 LAB
ALBUMIN SERPL BCG-MCNC: 3 G/DL (ref 3.5–5.2)
ALP SERPL-CCNC: 97 U/L (ref 40–150)
ALT SERPL W P-5'-P-CCNC: 87 U/L (ref 0–50)
ANION GAP SERPL CALCULATED.3IONS-SCNC: 11 MMOL/L (ref 7–15)
AST SERPL W P-5'-P-CCNC: 37 U/L (ref 0–45)
ATRIAL RATE - MUSE: 60 BPM
ATRIAL RATE - MUSE: 79 BPM
ATRIAL RATE - MUSE: 90 BPM
BILIRUB SERPL-MCNC: 0.2 MG/DL
BUN SERPL-MCNC: 39 MG/DL (ref 8–23)
CALCIUM SERPL-MCNC: 9 MG/DL (ref 8.8–10.4)
CHLORIDE SERPL-SCNC: 109 MMOL/L (ref 98–107)
CREAT SERPL-MCNC: 1.43 MG/DL (ref 0.51–0.95)
DIASTOLIC BLOOD PRESSURE - MUSE: NORMAL MMHG
EGFRCR SERPLBLD CKD-EPI 2021: 38 ML/MIN/1.73M2
ERYTHROCYTE [DISTWIDTH] IN BLOOD BY AUTOMATED COUNT: 17.2 % (ref 10–15)
GLUCOSE BLDC GLUCOMTR-MCNC: 148 MG/DL (ref 70–99)
GLUCOSE BLDC GLUCOMTR-MCNC: 227 MG/DL (ref 70–99)
GLUCOSE SERPL-MCNC: 106 MG/DL (ref 70–99)
HCO3 SERPL-SCNC: 21 MMOL/L (ref 22–29)
HCT VFR BLD AUTO: 31 % (ref 35–47)
HGB BLD-MCNC: 9.4 G/DL (ref 11.7–15.7)
INTERPRETATION ECG - MUSE: NORMAL
LVEF ECHO: NORMAL
MCH RBC QN AUTO: 26.6 PG (ref 26.5–33)
MCHC RBC AUTO-ENTMCNC: 30.3 G/DL (ref 31.5–36.5)
MCV RBC AUTO: 88 FL (ref 78–100)
P AXIS - MUSE: 38 DEGREES
P AXIS - MUSE: 58 DEGREES
P AXIS - MUSE: 66 DEGREES
PLATELET # BLD AUTO: 305 10E3/UL (ref 150–450)
POTASSIUM SERPL-SCNC: 3.9 MMOL/L (ref 3.4–5.3)
PR INTERVAL - MUSE: 190 MS
PR INTERVAL - MUSE: 200 MS
PR INTERVAL - MUSE: 214 MS
PROT SERPL-MCNC: 5.7 G/DL (ref 6.4–8.3)
QRS DURATION - MUSE: 76 MS
QRS DURATION - MUSE: 82 MS
QRS DURATION - MUSE: 86 MS
QT - MUSE: 352 MS
QT - MUSE: 394 MS
QT - MUSE: 410 MS
QTC - MUSE: 410 MS
QTC - MUSE: 430 MS
QTC - MUSE: 451 MS
R AXIS - MUSE: -2 DEGREES
R AXIS - MUSE: 3 DEGREES
R AXIS - MUSE: 6 DEGREES
RBC # BLD AUTO: 3.53 10E6/UL (ref 3.8–5.2)
SODIUM SERPL-SCNC: 141 MMOL/L (ref 135–145)
SYSTOLIC BLOOD PRESSURE - MUSE: NORMAL MMHG
T AXIS - MUSE: 33 DEGREES
T AXIS - MUSE: 34 DEGREES
T AXIS - MUSE: 42 DEGREES
VANCOMYCIN SERPL-MCNC: 19.7 UG/ML
VENTRICULAR RATE- MUSE: 60 BPM
VENTRICULAR RATE- MUSE: 79 BPM
VENTRICULAR RATE- MUSE: 90 BPM
WBC # BLD AUTO: 8.9 10E3/UL (ref 4–11)

## 2025-03-10 PROCEDURE — 93306 TTE W/DOPPLER COMPLETE: CPT

## 2025-03-10 PROCEDURE — 93306 TTE W/DOPPLER COMPLETE: CPT | Mod: 26 | Performed by: INTERNAL MEDICINE

## 2025-03-10 PROCEDURE — 82962 GLUCOSE BLOOD TEST: CPT

## 2025-03-10 PROCEDURE — 97116 GAIT TRAINING THERAPY: CPT | Mod: GP

## 2025-03-10 PROCEDURE — 93298 REM INTERROG DEV EVAL SCRMS: CPT | Performed by: INTERNAL MEDICINE

## 2025-03-10 PROCEDURE — 250N000013 HC RX MED GY IP 250 OP 250 PS 637: Performed by: STUDENT IN AN ORGANIZED HEALTH CARE EDUCATION/TRAINING PROGRAM

## 2025-03-10 PROCEDURE — 97161 PT EVAL LOW COMPLEX 20 MIN: CPT | Mod: GP

## 2025-03-10 PROCEDURE — 258N000003 HC RX IP 258 OP 636: Performed by: HOSPITALIST

## 2025-03-10 PROCEDURE — 87449 NOS EACH ORGANISM AG IA: CPT | Performed by: STUDENT IN AN ORGANIZED HEALTH CARE EDUCATION/TRAINING PROGRAM

## 2025-03-10 PROCEDURE — 93975 VASCULAR STUDY: CPT

## 2025-03-10 PROCEDURE — 250N000012 HC RX MED GY IP 250 OP 636 PS 637: Performed by: STUDENT IN AN ORGANIZED HEALTH CARE EDUCATION/TRAINING PROGRAM

## 2025-03-10 PROCEDURE — 99233 SBSQ HOSP IP/OBS HIGH 50: CPT | Mod: GC | Performed by: INTERNAL MEDICINE

## 2025-03-10 PROCEDURE — 120N000002 HC R&B MED SURG/OB UMMC

## 2025-03-10 PROCEDURE — 36415 COLL VENOUS BLD VENIPUNCTURE: CPT | Performed by: STUDENT IN AN ORGANIZED HEALTH CARE EDUCATION/TRAINING PROGRAM

## 2025-03-10 PROCEDURE — 250N000011 HC RX IP 250 OP 636: Performed by: HOSPITALIST

## 2025-03-10 PROCEDURE — 99232 SBSQ HOSP IP/OBS MODERATE 35: CPT | Mod: GC | Performed by: STUDENT IN AN ORGANIZED HEALTH CARE EDUCATION/TRAINING PROGRAM

## 2025-03-10 PROCEDURE — 250N000013 HC RX MED GY IP 250 OP 250 PS 637: Performed by: PHYSICIAN ASSISTANT

## 2025-03-10 PROCEDURE — 97530 THERAPEUTIC ACTIVITIES: CPT | Mod: GP

## 2025-03-10 PROCEDURE — 250N000011 HC RX IP 250 OP 636: Performed by: PHYSICIAN ASSISTANT

## 2025-03-10 PROCEDURE — 99232 SBSQ HOSP IP/OBS MODERATE 35: CPT | Mod: FS | Performed by: HOSPITALIST

## 2025-03-10 PROCEDURE — 85018 HEMOGLOBIN: CPT | Performed by: PHYSICIAN ASSISTANT

## 2025-03-10 PROCEDURE — 82040 ASSAY OF SERUM ALBUMIN: CPT | Performed by: PHYSICIAN ASSISTANT

## 2025-03-10 PROCEDURE — 93975 VASCULAR STUDY: CPT | Mod: 26 | Performed by: RADIOLOGY

## 2025-03-10 PROCEDURE — 80202 ASSAY OF VANCOMYCIN: CPT | Performed by: HOSPITALIST

## 2025-03-10 RX ADMIN — PREDNISONE 10 MG: 5 TABLET ORAL at 08:42

## 2025-03-10 RX ADMIN — VANCOMYCIN HYDROCHLORIDE 750 MG: 500 INJECTION, POWDER, LYOPHILIZED, FOR SOLUTION INTRAVENOUS at 21:16

## 2025-03-10 RX ADMIN — LEVOTHYROXINE SODIUM 175 MCG: 0.17 TABLET ORAL at 08:43

## 2025-03-10 RX ADMIN — ACETAMINOPHEN 650 MG: 325 TABLET, FILM COATED ORAL at 17:00

## 2025-03-10 RX ADMIN — APIXABAN 5 MG: 5 TABLET, FILM COATED ORAL at 21:15

## 2025-03-10 RX ADMIN — SIROLIMUS 1 MG: 1 TABLET, FILM COATED ORAL at 08:43

## 2025-03-10 RX ADMIN — FOLIC ACID 1000 MCG: 1 TABLET ORAL at 08:42

## 2025-03-10 RX ADMIN — OMEGA-3-ACID ETHYL ESTERS 1 G: 1 CAPSULE, LIQUID FILLED ORAL at 21:15

## 2025-03-10 RX ADMIN — ERTAPENEM SODIUM 1 G: 1 INJECTION, POWDER, LYOPHILIZED, FOR SOLUTION INTRAMUSCULAR; INTRAVENOUS at 06:15

## 2025-03-10 RX ADMIN — CALCITRIOL CAPSULES 0.25 MCG 0.25 MCG: 0.25 CAPSULE ORAL at 08:43

## 2025-03-10 RX ADMIN — EZETIMIBE 10 MG: 10 TABLET ORAL at 08:43

## 2025-03-10 RX ADMIN — LINAGLIPTIN 5 MG: 5 TABLET, FILM COATED ORAL at 08:43

## 2025-03-10 RX ADMIN — URSODIOL 250 MG: 250 TABLET ORAL at 08:42

## 2025-03-10 RX ADMIN — OMEGA-3-ACID ETHYL ESTERS 1 G: 1 CAPSULE, LIQUID FILLED ORAL at 08:42

## 2025-03-10 RX ADMIN — GABAPENTIN 300 MG: 250 SOLUTION ORAL at 21:40

## 2025-03-10 RX ADMIN — URSODIOL 250 MG: 250 TABLET ORAL at 21:16

## 2025-03-10 RX ADMIN — APIXABAN 5 MG: 5 TABLET, FILM COATED ORAL at 08:42

## 2025-03-10 ASSESSMENT — ACTIVITIES OF DAILY LIVING (ADL)
ADLS_ACUITY_SCORE: 62
ADLS_ACUITY_SCORE: 60
ADLS_ACUITY_SCORE: 60
ADLS_ACUITY_SCORE: 62
ADLS_ACUITY_SCORE: 60
ADLS_ACUITY_SCORE: 62
ADLS_ACUITY_SCORE: 60
ADLS_ACUITY_SCORE: 62
ADLS_ACUITY_SCORE: 62

## 2025-03-10 NOTE — PROGRESS NOTES
[facility]    Medicine Progress Note - Hospitalist Service, GOLD TEAM 1    Date of Admission:  3/8/2025    Updates today:  - appreciate ID input, continue current Abx for now  - liver US w/ duplex per GI, appreciate input-report is pending  -TTE today without evidence of vegetation/endocarditis and normal EF, no significant dysfunction  - continue to trend blood cx  -Glucose checks twice daily and at bedtime  -Added Danna [employer] supplement pending nutrition consult  - potentially medically ready to discharge in 2-3 days if Abx plan (suspect return home but PT is pending)    Assessment & Plan   Luz Thompson is a 74F w/ PMH of primary biliary cirrhosis s/p liver transplant (2002), paroxysmal atrial fibrillation on Eliquis, CVA, CKD, HTN, DM2, PAD, hypothyroidism who was admitted with sepsis.      Sepsis-resolved  Klebsiella pneumoniae UTI  Gram positive cocci bacteremia  Hx of recurrent UTIs  Follows with Urology and ID for recurrent UTIs. Presented with fever of 100.6 DegF, leukocytosis. Urinalysis c/w infection with urine culture now growing >100k colonies Klebsiella pneumoniae, does have a hx of ESBL Klebsiella. 2 of 2 blood cultures growing gram positive cocci. Started meropenem and received 1x dose of vancomycin in ED.   - Transplant ID consult   - Abx:  - ertapenem (3/9-)    - Started IV vancomycin (3/9-)   - discontinue meropenem (3/8-3/9)   - Repeat blood cultures x2 (3/9)   - Follow pending blood and urine cultures   - PT, OT consults   - Vitals q4h     Transaminitis  Primary biliary cirrhosis s/p liver transplant (2002)  Labs on admission notable for ALT 61, AST 58 with normal alk phos and Tbili. CT a/p with subtle hypodensity in inferior liver c/f peripheral biliary ductal dilatation or edema.   - Hepatology consult input appreciated  - liver US w/ doppler   - IS:               - Prednisone 10 mg daily               - Sirolimus 1 mg daily   - Ursodiol 250 mg  "BID   - Trend hepatic panel     HTN  HLD  Paroxsymal atrial fibrillation  Blood pressures soft secondary to the above. Bradycardic overnight with HR in 40s, asymptomatic   - Hold PTA hydralazine, metoprolol. Resume as BP allows.   - Continue PTA Eliquis 5 mg BID   - Continue PTA ezetimibe   - Telemetry monitoring     DM2  Diabetic neuropathy  A1c 6.9 in December.   Recent Labs   Lab 03/10/25  0614 03/09/25  1720 03/09/25  1043 03/08/25  1606   * 217* 136* 183*   '  - glucose checks BID and HS   - Continue PTA linagliptin   - Continue PTA gabapentin   - BG checks BID. No need for sliding scale insulin currently, initiate if BG persistently >180     CKD IIIb: Creatinine stable within baseline range of ~1.4 - 1.6. Next scheduled with Nephrology on 4/2/25.      Hypothyroidism: TSH normal (1/20/2025) continue PTA levothyroxine.      SCC of left cheek: Scheduled for MOHS excision with Dr. May on 4/1/25.             Diet: High Consistent Carb (75 g CHO per Meal) Diet    DVT Prophylaxis: DOAC  Ron Catheter: Not present  Lines: None     Cardiac Monitoring: ACTIVE order. Indication: Bradycardias (48 hours)  Code Status: Full Code      Clinically Significant Risk Factors          # Hyperchloremia: Highest Cl = 109 mmol/L in last 2 days, will monitor as appropriate       # Hypercalcemia: corrected calcium is >10.1, will monitor as appropriate    # Hypoalbuminemia: Lowest albumin = 3 g/dL at 3/10/2025  6:14 AM, will monitor as appropriate     # Hypertension: Noted on problem list           # DMII: A1C = 6.9 % (Ref range: <5.7 %) within past 6 months, PRESENT ON ADMISSION  # Overweight: Estimated body mass index is 26.23 kg/m  as calculated from the following:    Height as of 2/14/25: 1.715 m (5' 7.5\").    Weight as of 2/12/25: 77.1 kg (170 lb)., PRESENT ON ADMISSION     # Financial/Environmental Concerns: none         Social Drivers of Health    Housing Stability: Low Risk  (1/27/2025)    Housing Stability     Do you " have housing? : Yes     Are you worried about losing your housing?: No   Recent Concern: Housing Stability - High Risk (1/22/2025)    Housing Stability     Do you have housing? : No     Are you worried about losing your housing?: No   Tobacco Use: Medium Risk (2/12/2025)    Patient History     Smoking Tobacco Use: Former     Smokeless Tobacco Use: Never   Transportation Needs: High Risk (1/27/2025)    Transportation Needs     Within the past 12 months, has lack of transportation kept you from medical appointments, getting your medicines, non-medical meetings or appointments, work, or from getting things that you need?: Yes   Physical Activity: Insufficiently Active (11/9/2023)    Received from [facility]    Exercise Vital Sign     Days of Exercise per Week: 5 days     Minutes of Exercise per Session: 10 min          Disposition Plan     Medically Ready for Discharge: Anticipated in 2-4 Days           The patient's care was discussed with the Attending Physician, Dr. Olivas, Bedside Nurse, Patient, and GI hepatology Consultant(s).    Karin Cast PA-C  Hospitalist Service, [facility] TEAM 1  [facility]  Securely message with [employer] (more info)  Text page via [facility] Paging/Directory   See signed in provider for up to date coverage information  ______________________________________________________________________    Interval History   [place]ia endorses that she is feeling better today.  She is interested in nutritional supplements but notes that some that she has tried did not taste great.  She uses a nutritional supplement that is plant-based at home and that unfortunately we do not have on formulary.    She has been tolerating p.o. without any nausea or vomiting and does not endorse any pain including abdominal or chest pain.  She does not endorse having any breathing difficulties.  She continues to ambulate with a cane but notes that she has some difficulties with knee  buckling due to known arthritis for which she is hoping to stave off surgical intervention.    She does not notice any new signs for infection and has not had any fevers, chills, new rashes.  She is voiding without issues and is having regular bowel movements.    Physical Exam   Vital Signs: Temp: 97.9  F (36.6  C) Temp src: Oral BP: (!) 160/76 Pulse: 71   Resp: 16 SpO2: 96 % O2 Device: Nasal cannula Oxygen Delivery: 1 LPM  Weight: 0 lbs 0 oz  GENERAL: Alert and oriented x 3. NAD.  Able to sit upright unassisted. Cooperative.   HEENT: Anicteric sclera.  Pupils equal and round. NC. AT.   CV: RRR. S1, S2. No murmurs appreciated.   RESPIRATORY: Effort normal on RA. Lungs CTAB with no wheezing, rales, rhonchi.   GI: Abdomen soft.  NABS. No tenderness, rebound, guarding. No lesions.   NEUROLOGICAL: No focal deficits. Moves all extremities.    EXTREMITIES: No peripheral edema. Intact bilateral pedal pulses.   SKIN: No jaundice. No rashes on exposed skin.  BACK: no CVA tenderness, no spinous tenderness      Medical Decision Making       55 MINUTES SPENT BY ME on the date of service doing chart review, history, exam, documentation & further activities per the note.      Data     I have personally reviewed the following data over the past 24 hrs:    8.9  \   9.4 (L)   / 305     141 109 (H) 39.0 (H) /  106 (H)   3.9 21 (L) 1.43 (H) \     ALT: 87 (H) AST: 37 AP: 97 TBILI: 0.2   ALB: 3.0 (L) TOT PROTEIN: 5.7 (L) LIPASE: N/A       Imaging results reviewed over the past 24 hrs:   Recent Results (from the past 24 hours)   Echocardiogram Complete   Result Value    LVEF  60-65%    Narrative    [id number]  [id number]  [id number]  [id number]^MARY JO^AVERY^A     [facility]  Echocardiography Laboratory  [address] [place] [id number]     Name: ISA CAMARGO  MRN: [id number]  : 1950  Study Date: 03/10/2025 09:11 AM  Age: 74 yrs  Gender: Female  Patient Location: [facility]  Reason For Study:  Endocarditis  Ordering Physician: AVERY CAMARGO  Performed By: Renetta Melara     BSA: 1.9 m2  Height: 67 in  Weight: 170 lb  HR: 70  BP: 147/79 mmHg  ______________________________________________________________________________  Procedure  Echocardiogram with two-dimensional, color and spectral Doppler. Poor acoustic  windows.  ______________________________________________________________________________  Interpretation Summary  No vegetation or mass identified. Global and regional left ventricular  function is normal with an EF of 60-65%.  Right ventricular function, chamber size, wall motion, and thickness are  normal.  The inferior vena cava is normal.  No pericardial effusion is present.  ______________________________________________________________________________  Left Ventricle  Global and regional left ventricular function is normal with an EF of 60-65%.  Left ventricular wall thickness is normal. Left ventricular size is normal.  Diastolic function not assessed due to significant mitral annular  calcification. No regional wall motion abnormalities are seen.     Right Ventricle  Right ventricular function, chamber size, wall motion, and thickness are  normal.     Atria  Both atria appear normal.     Mitral Valve  Moderate mitral annular calcification is present. Trace to mild mitral  insufficiency is present.     Aortic Valve  Aortic valve sclerosis is present. Trace to mild aortic insufficiency is  present.     Tricuspid Valve  The tricuspid valve is normal. Mild tricuspid insufficiency is present. The  right ventricular systolic pressure is approximated at 11.8 mmHg plus the  right atrial pressure. Pulmonary artery systolic pressure is normal.     Pulmonic Valve  The pulmonic valve is normal.     Vessels  The thoracic aorta cannot be assessed. The pulmonary artery cannot be  assessed. The inferior vena cava is normal.     Pericardium  No pericardial effusion is present.      ______________________________________________________________________________  MMode/2D Measurements & Calculations  LVOT diam: 1.9 cm  LVOT area: 2.7 cm2     Doppler Measurements & Calculations  Ao V2 max: 153.4 cm/sec  Ao max P.4 mmHg  Ao V2 mean: 101.0 cm/sec  Ao mean P.6 mmHg  Ao V2 VTI: 34.9 cm  MAYUR(I,D): 1.6 cm2  MAYUR(V,D): 1.7 cm2  LV V1 max PG: 3.8 mmHg  LV V1 max: 97.8 cm/sec  LV V1 VTI: 20.7 cm  SV(LVOT): 55.7 ml  SI(LVOT): 29.5 ml/m2  TR max nate: 171.6 cm/sec  TR max P.8 mmHg  AV Nate Ratio (DI): 0.64  MAYUR Index (cm2/m2): 0.85     ______________________________________________________________________________  Report approved by: JAY Fishman MD on 03/10/2025 10:14 AM

## 2025-03-10 NOTE — CONSULTS
Acknowledging duplicative consult. See note from 3/9.    ________________    HUMA Moore, LGSW  ED/Observation   [facility]  Phone: [phone]  Fax: [phone]    After hours [facility] [place] and After Hours [facility] [place]  Available from 4:00pm - 8:30pm

## 2025-03-10 NOTE — PHARMACY-VANCOMYCIN DOSING SERVICE
Pharmacy Vancomycin Note  Date of Service March 10, 2025  Patient's  1950   74 year old, female, ABW 77.1 kg    Indication: FUO, E.faecalis bacteremia  Day of Therapy: 3  Current vancomycin regimen:  1000 mg IV q24h  Current vancomycin monitoring method: AUC  Current vancomycin therapeutic monitoring goal: 400-600 mg*h/L    InsightRX Prediction of Current Vancomycin Regimen      Current estimated CrCl = Estimated Creatinine Clearance: 36.8 mL/min (A) (based on SCr of 1.43 mg/dL (H)).    Creatinine for last 3 days  3/8/2025:  4:06 PM Creatinine 1.56 mg/dL  3/9/2025: 10:43 AM Creatinine 1.34 mg/dL  3/10/2025:  6:14 AM Creatinine 1.43 mg/dL    Recent Vancomycin Levels (past 3 days)  3/10/2025:  6:14 AM Vancomycin 19.7 ug/mL - 11.5 hrs post-dose    Vancomycin IV Administrations (past 72 hours)                     vancomycin (VANCOCIN) 1,000 mg in 200 mL dextrose intermittent infusion (mg) 1,000 mg New Bag 25 1838    vancomycin (VANCOCIN) 1,750 mg in sodium chloride 0.9 % 567.5 mL intermittent infusion (mg) 1,750 mg Given 25 1931                    Nephrotoxins and other renal medications (From now, onward)      Start     Dose/Rate Route Frequency Ordered Stop    25 1900  vancomycin (VANCOCIN) 1,000 mg in 200 mL dextrose intermittent infusion         1,000 mg  200 mL/hr over 1 Hours Intravenous EVERY 24 HOURS 25 1207                 Contrast Orders - past 72 hours (72h ago, onward)      Start     Dose/Rate Route Frequency Stop    25 1750  iopamidol (ISOVUE-370) solution 104 mL         104 mL Intravenous ONCE 25 0715            Interpretation of levels and current regimen:  Vancomycin level is reflective of -600 but near top end of target range    Has serum creatinine changed greater than 50% in last 72 hours: No, SCR has remained stable within baseline range of 1.4-1.6    Urine output:  good urine output, multiple unmeasured voids charted yest 3/9    Renal Function:  Stable    InsightRX Prediction of Planned New Vancomycin Regimen      Plan:  Decrease dose to 750 mg IV q24h, given no MRSA isolated, and lower dose will maintain target AUC goal while reducing nephrotoxicity risk.  Vancomycin monitoring method: AUC  Vancomycin therapeutic monitoring goal: 400-600 mg*h/L  Pharmacy will check vancomycin levels as appropriate in 1-3 Days.  Serum creatinine levels will be ordered daily for the first week of therapy and at least twice weekly for subsequent weeks.    Daquan Torres, PharmD, BCPS

## 2025-03-10 NOTE — PROGRESS NOTES
ED PT Evaluation:      03/10/25 5595   Appointment Info   Signing Clinician's Name / Credentials (PT) Jose Antonio Mcwilliams, PT, DPT   Living Environment   People in Home alone   Current Living Arrangements independent living facility   Home Accessibility no concerns   Transportation Anticipated car, drives self;family or friend will provide   Self-Care   Equipment Currently Used at Home walker, rolling;cane, straight;grab bar, toilet;grab bar, tub/shower   Fall history within last six months no   Activity/Exercise/Self-Care Comment Mod I with 4WW at baseline. Walks to dining area daily for meals. Also has SPC but prefers not to use. Has cleaning service.   General Information   Onset of Illness/Injury or Date of Surgery 03/08/25   Referring Physician Ashley Tamez PA-C   Patient/Family Therapy Goals Statement (PT) Return home   Pertinent History of Current Problem (include personal factors and/or comorbidities that impact the POC) Per EMR: Luz Thompson is a 74 year old woman with a history of primary biliary cirrhosis s/p liver transplant (2002), paroxysmal atrial fibrillation on Eliquis, CVA, CKD, HTN, DM2, PAD, hypothyroidism who was admitted with sepsis   Existing Precautions/Restrictions no known precautions/restrictions   General Observations pt supine, pleasant, agreeable to session   Cognition   Affect/Mental Status (Cognition) WFL   Posture    Posture Forward head position;Protracted shoulders   Range of Motion (ROM)   Range of Motion ROM is WFL   Strength (Manual Muscle Testing)   Strength (Manual Muscle Testing) Deficits observed during functional mobility   Bed Mobility   Bed Mobility supine-sit   Supine-Sit [place] (Bed Mobility) modified independence   Assistive Device (Bed Mobility) bed rails   Transfers   Transfers sit-stand transfer   Sit-Stand Transfer   Sit-Stand [place] (Transfers) modified independence   Assistive Device (Sit-Stand Transfers) walker, 4-wheeled   Gait/Stairs  (Locomotion)   [place] Level (Gait) supervision   Assistive Device (Gait) walker, 4-wheeled   Distance in Feet (Gait) 5   Balance   Balance Comments compensates well with 4WW   Clinical Impression   Criteria for Skilled Therapeutic Intervention Yes, treatment indicated   PT Diagnosis (PT) Impaired functional mobility   Influenced by the following impairments generalized weakness/deconditioning   Functional limitations due to impairments activity tolerance, gait   Clinical Presentation (PT Evaluation Complexity) stable   Clinical Presentation Rationale Clinical judgment   Clinical Decision Making (Complexity) low complexity   Planned Therapy Interventions (PT) balance training;gait training;home exercise program;patient/family education;strengthening;progressive activity/exercise;risk factor education;home program guidelines   Risk & Benefits of therapy have been explained evaluation/treatment results reviewed;care plan/treatment goals reviewed;risks/benefits reviewed;current/potential barriers reviewed;participants voiced agreement with care plan;participants included;patient   PT Total Evaluation Time   PT Eval, Low Complexity Minutes (48368) 6   Physical Therapy Goals   PT Frequency Daily   PT Predicted Duration/Target Date for Goal Attainment 03/24/25   PT Goals Gait;Aerobic Activity   PT: Gait Modified independent;Assistive device;Rolling walker;Greater than 200 feet   PT: Perform aerobic activity with stable cardiovascular response intermittent activity;continuous activity;15 minutes;NuStep;ambulation   PT Discharge Planning   PT Plan gait with 4WW, functional strengthening   PT Discharge Recommendation (DC Rec) home   PT Rationale for DC Rec Patient improving towards baseline. Anticipate steady progress to enable discharge home when medically cleared.   PT Brief overview of current status Mod I with 4WW   PT Total Distance Amb During Session (feet) 400   Physical Therapy Time and Intention   Total Session  Time (sum of timed and untimed services) 6

## 2025-03-10 NOTE — PROGRESS NOTES
Clinic Care Coordination Contact    Situation: Patient chart reviewed by care coordinator.    Background: Patient is enrolled in Care Coordination and followed by RN CC who has been monitoring patient throughout enrollment for goal progression. She was admitted to [facility] on 3/8/25 for Fever, Sepsis secondary to UTI.      Assessment: RN CC entered an Inpatient Care Coordinaton order for transitions of care.     Plan/Recommendations: RN CC will continue to monitor patient for discharge from facility.     Alpa Cho RN, BSN, CPHN, CCM  [facility] Ambulatory Care Management  [facility], and [facility]  [email or web address]  Office: [phone]  Employed by [facility]

## 2025-03-10 NOTE — PROGRESS NOTES
[facility]  Transplant Infectious Disease Progress Note     Patient:  Luz Thompson, Date of birth 1950, Medical record number [id number]  Date of Visit:  03/10/2025         Assessment and Recommendations:   Recommendations:  - Continue ertapenem 1g IV daily for Klebsiella pneumoniae in the urine  - Continue vancomycin dosed per pharmacy for Enterococcus faecalis in the blood  - Requested fosfomycin susceptibilities on the Klebsiella isolate given her antimicrobial allergies  - Will follow-up the TTE result to assess for any evidence of endocarditis    Transplant Infectious Disease will continue to follow with you.      Patient was discussed with transplant ID attending Dr Montero.     Katelyn Dawn MD  Infectious diseases PGY-4  Contact via APR    Assessment:  #Enterococcus faecalis bacteremia  #Klebsiella pneumoniae in urine culture  Presenting from home with fever of 102F. She has previously had workup for fever of unknown origin without obvious etiology determine. She presents with fever without clear associated symptoms but on workup was found to have Enterococcus faecalis in 2/2 blood cultures. UA with +leuk est, WBC > 182, +nitrite. Respiratory panel, COVID/influenza/RSV negative. Urine culture with Klebsiella pneumoniae, although denies dysuria, CVA tenderness, or other UTI symptoms, only fever. CT abd/pelvis only noted subtle curvilinear hypodensity in the inferior liver and no current abdominal symptoms. Highest suspicion of E faecalis source is from urine given history of previous UTI symptoms and history of recurrent UTIs. Her previous ESBL isolates have been urinary but were Klebsiella oxytoca, also susceptible to carbapenems. Given the recent fever of unknown origin, will proceed with TTE to evaluate for possible endocarditis, HENRIQUE score 1 and overall not high risk for endocarditis. She does not have any implanted hardware aside from a loop recorder which as  "not caused issues. Her antibiotic allergies complicate antibiotic choices here, although appears to have been prescribed linezolid 11/2023 and appears to have tolerated it and the Enterococcus isolate is susceptible and thus an option if only requires 2 weeks of therapy. Will also request fosfomycin susceptibilities for the Klebsiella pneumoniae urine isolate.    #Hx coccidioides  Dx in winter [id number], did not tolerate fluconazole due to severe rash. Treated with voriconazole, continued until her return to [place]. CXR with stable calcifications. Recent fungal antibody panel negative though unclear how reliable this is. Voriconazole was stopped 6/2024.     #Hx EBV viremia s/p rituxan 2016  No lymphadenopathy on recent abdomen/pelvis CT. Not detected 1/20/2025     #Numerous reported antimicrobial allergies  Per allergy note 8/2/2019 \"Prick and intradermal and patch testing to fluconazole, doxycycline, azithromycin, penicillins, and cephalosporins with immediate and delayed readings being negative. \" States she will never take fluconazole again. Would be candidate for oral provocation testing in future with pcn. Also notes an allergy to the  capsules, states tolerates pills or liquid formulations, sometimes needs them compounded.    Infectious Disease issues include:  - QTc interval: 451 on 3/9/25  - Bacterial coverage: as above  - Pneumocystis prophylaxis: none  - Serostatus & viral prophylaxis: EBV R+, CMV R-  - Fungal prophylaxis: none, previously voriconazole  - Risk factors to suggest check of Toxoplasma, Strongy, or Schisto serology?: toxoplasma negative 1/20/25  - Immunization status: Up to date on influenza, COVID; due for Tdap  - Gamma globulin status: 1110 on 8/5/2019  - Isolation status: Good hand hygiene. Contact for ESBL history  - Code status: Full Code         Interval History:   No acute events overnight.  No dysuria. Overall is feeling well, has no new complaints. No flank pain or dysuria.  A " TTE was performed this morning.  No fevers. Her leukocytosis has resolved. No further positive blood cultures. The Klebsiella pneumo isolate is pan-S except ampicillin. E faecalis is pan-S.      Transplants:  5/22/2002 (Liver), Postoperative day:  8328.  Coordinator Angelika Frausto    Review of Systems:  CONSTITUTIONAL:  no fevers or chills  EYES: no vision changes  ENT:  no sore throat  RESPIRATORY:  no cough or shortness of breath  CARDIOVASCULAR:  no chest pain  GASTROINTESTINAL:  no recent diarrhea or constipation  GENITOURINARY:  no dysuria, no flank pain  HEME:  no bleeding  INTEGUMENT:  known SCC on left cheek  NEURO:  no headache         Current Medications & Allergies:     Current Facility-Administered Medications   Medication Dose Route Frequency Provider Last Rate Last Admin    apixaban ANTICOAGULANT (ELIQUIS) tablet 5 mg  5 mg Oral BID Madhu Quiñones MD   5 mg at 03/10/25 0842    calcitRIOL (ROCALTROL) capsule 0.25 mcg  0.25 mcg Oral Daily Madhu Quiñones MD   0.25 mcg at 03/10/25 0843    ertapenem (INVanz) 1 g vial to attach to  mL bag  1 g Intravenous Q24H Sindhu Thompson PA-C   Stopped at 03/10/25 0630    ezetimibe (ZETIA) tablet 10 mg  10 mg Oral Daily Madhu Quiñones MD   10 mg at 03/10/25 0843    folic acid (FOLVITE) tablet 1,000 mcg  1,000 mcg Oral Daily Madhu Quiñones MD   1,000 mcg at 03/10/25 0842    gabapentin (NEURONTIN) solution 300 mg  300 mg Oral At Bedtime Madhu Quiñones MD   300 mg at 03/09/25 2255    [Held by provider] hydrALAZINE (APRESOLINE) tablet 25 mg  25 mg Oral TID Madhu Quiñones MD   25 mg at 03/08/25 2129    ketoconazole (NIZORAL) 2 % shampoo   Topical Once per day on Monday Wednesday Friday Madhu Quiñones MD        levothyroxine (SYNTHROID/LEVOTHROID) tablet 175 mcg  175 mcg Oral Daily Madhu Quiñones MD   175 mcg at 03/10/25 0843    linagliptin (TRADJENTA) tablet 5 mg  5 mg Oral Daily Madhu Quiñones MD   5 mg at 03/10/25 0843    [Held by provider]  metoprolol succinate ER (TOPROL XL) 24 hr tablet 25 mg  25 mg Oral Daily Madhu Quiñones MD        omega-3 acid ethyl esters (LOVAZA) capsule 1 g  1 g Oral BID Ahsley Tamez PA-C   1 g at 03/10/25 0842    predniSONE (DELTASONE) tablet 10 mg  10 mg Oral Daily Madhu Quiñones MD   10 mg at 03/10/25 0842    sirolimus (GENERIC EQUIVALENT) tablet 1 mg  1 mg Oral Daily Madhu Quiñones MD   1 mg at 03/10/25 0843    sodium chloride (PF) 0.9% PF flush 3 mL  3 mL Intracatheter Q8H Madhu Quiñones MD   3 mL at 03/09/25 2026    ursodiol (ACTIGALL) tablet 250 mg  250 mg Oral BID Madhu Quiñones MD   250 mg at 03/10/25 0842    vancomycin (VANCOCIN) 750 mg in sodium chloride 0.9 % 257.5 mL intermittent infusion  750 mg Intravenous Q24H Ha Olivas MD           Infusions/Drips:  Current Facility-Administered Medications   Medication Dose Route Frequency Provider Last Rate Last Admin    Patient is already receiving anticoagulation with heparin, enoxaparin (LOVENOX), warfarin (COUMADIN)  or other anticoagulant medication   Does not apply Continuous PRN Madhu Quiñones MD           Allergies   Allergen Reactions    Fluconazole Hives and Itching     Full body hives  **Intradermal skin testing negative**  [See intradermal skin testing results from 8/2/2019]    Mycophenolate Diarrhea and Nausea and Vomiting     Patient stated it was chronic and lasted months      Penicillins Anaphylaxis, Hives, Itching and Rash     **Intradermal skin testing negative**  [See intradermal skin testing results from 8/2/2019]      Simvastatin Muscle Pain (Myalgia)     severe  Other reaction(s): Myalgia caused by statin    Methotrexate Other (See Comments)     Other reaction(s): Sore  Sores in mouth, esophagus, and stomach.       Morphine And Codeine Itching and Other (See Comments)     Psych disturbance  Other reaction(s): Confusion, Mood alteration    Quinolones Anxiety, Dizziness, Headache, Other (See Comments), Palpitations and  Unknown     Other reaction(s): Hyperactive behavior, Lightheadedness, Mood alteration    Dizzy, light headed    Dizziness, shaky, and jumpy    Benadryl [Diphenhydramine Hcl]      Insomnia     Capsules, Empty Gelatin [Gelatin]     Lansoprazole Diarrhea    Azithromycin Itching     [See intradermal skin testing results from 8/2/2019]    Bactrim [Sulfamethoxazole-Trimethoprim] Other (See Comments)     Numb mouth, tingling lips (treated with anti-histamines)    Cephalosporins Itching     [See intradermal skin testing results from 8/2/2019]    Ciprofloxacin Hcl Other (See Comments) and Dizziness     Insomnia, mood lability, Irregular heart beat         Doxycycline Itching and Unknown     [See intradermal skin testing results from 8/2/2019]    Lisinopril Cough    Omeprazole Itching    Tolectin [Nsaids] Rash    Tolmetin Rash and Itching    Tramadol Rash, Hives and Itching            Physical Exam:   Patient Vitals for the past 24 hrs:   BP Temp Temp src Pulse Resp SpO2   03/10/25 0439 (!) 160/76 97.9  F (36.6  C) Oral 71 16 96 %   03/09/25 2320 129/80 98.5  F (36.9  C) Oral 80 16 95 %   03/09/25 2024 131/68 98.1  F (36.7  C) Oral 85 16 96 %   03/09/25 1533 120/70 -- -- 101 18 96 %   03/09/25 1238 124/54 98.5  F (36.9  C) Oral 84 18 95 %     Ranges for vital signs:  Temp:  [97.9  F (36.6  C)-98.5  F (36.9  C)] 97.9  F (36.6  C)  Pulse:  [] 71  Resp:  [16-18] 16  BP: (120-160)/(54-80) 160/76  SpO2:  [95 %-96 %] 96 %  There were no vitals filed for this visit.    Physical Examination:  GENERAL:  well-developed, well-nourished female, resting in bed in no acute distress.  HEAD:  Head is normocephalic, atraumatic.   EYES:  Eyes have anicteric sclerae without conjunctival injection   ENT:  Oropharynx is moist without exudates or ulcers.  NECK:  Supple.   LUNGS:  Clear to auscultation bilateral.   CARDIOVASCULAR:  Regular rate and rhythm with no murmur  ABDOMEN:  Normal bowel sounds, soft, nontender.   BACK: No CVA  tenderness  SKIN:  No acute rashes. 2cm raised plaque on cheek, known SCC. PIV in place without any surrounding erythema or exudate.  NEUROLOGIC:  Grossly nonfocal. Active x4 extremities         Laboratory Data:     Absolute CD4   Date Value Ref Range Status   08/19/2014 415 mm3 Final   09/16/2013 1,266 mm3 Final   09/15/2013 DUPLICATE,TESTING DONE ON SPECIMEN FROM 9.16.13 mm3 Final   11/13/2008 1332 mm3 Final       Inflammatory & Autoimmune Markers    Recent Labs   Lab Test 01/13/25  1410 06/23/23  1422 06/13/23  1820 08/26/19  2331 05/20/19  0957 07/30/18  0855   SED  --   --   --  78* 47* 73*   CRP  --   --   --  180.0* <2.9 5.2   CRPI 3.26 <3.00   < >  --   --   --     < > = values in this interval not displayed.       Immune Globulin Studies     Recent Labs   Lab Test 08/05/19  1552   IGG 1,110             Metabolic Studies       Recent Labs   Lab Test 03/10/25  0614 03/09/25  1720 03/09/25  1043 03/08/25  1852 03/08/25  1606 01/20/25  1800 01/20/25  1635 12/11/24  0229 12/10/24  2148     --  141  --  138   < >  --    < >  --    POTASSIUM 3.9  --  4.0  --  4.4   < >  --    < >  --    CHLORIDE 109*  --  108*  --  103   < >  --    < >  --    CO2 21*  --  20*  --  24   < >  --    < >  --    ANIONGAP 11  --  13  --  11   < >  --    < >  --    BUN 39.0*  --  38.5*  --  51.3*   < >  --    < >  --    CR 1.43*  --  1.34*  --  1.56*   < >  --    < >  --    GFRESTIMATED 38*  --  41*  --  34*   < >  --    < >  --    *   < > 136*  --  183*   < >  --    < >  --    A1C  --   --   --   --   --   --   --   --  6.9*   KEILY 9.0  --  8.9  --  10.4   < >  --    < >  --    PHOS  --   --  2.8  --   --   --   --   --   --    MAG  --   --  4.0*  --   --   --   --   --   --    LACT  --   --   --  1.0 2.2*  --   --    < >  --    PCAL  --   --   --   --  0.11  --   --    < >  --    FGTL  --   --   --   --   --   --  37  --   --     < > = values in this interval not displayed.       Hepatic Studies    Recent Labs    Lab Test 03/10/25  0614 03/09/25  1043 03/08/25  1606 01/22/25  0632 01/20/25  0614   BILITOTAL 0.2 0.4 0.5 0.2  --    DBIL  --   --   --  <0.20  --    ALKPHOS 97 102 110 103  --    PROTTOTAL 5.7* 6.1* 6.5 5.9*  --    ALBUMIN 3.0* 3.2* 3.5 3.1*  --    AST 37 46* 58* 18  --    ALT 87* 84* 61* 22  --    LDH  --   --   --   --  266*       Pancreatitis testing    Recent Labs   Lab Test 01/03/25  1733 12/10/24  1446 09/04/24  1008 09/18/20  0000 08/26/19  2331   LIPASE 29 33  --   --  66*   TRIG  --   --  329*   < >  --     < > = values in this interval not displayed.       Gout Labs      Recent Labs   Lab Test 08/05/19  1552   URIC 5.6       Hematology Studies   Recent Labs   Lab Test 03/10/25  0614 03/09/25  1043 03/08/25  1606 02/06/25  1600 01/04/25  0742 01/03/25  1733 10/11/24  2212 09/30/24  1545 06/13/23  1820 12/05/22  0954   WBC 8.9 15.1* 18.9* 7.8   < > 9.2   < > 8.2   < >  --    52456  --   --   --   --   --   --   --   --   --  9.0   ANEU  --   --   --   --   --  6.9  --  3.7  --   --    ANEUTAUTO  --   --  16.4* 5.1   < >  --    < >  --    < >  --    ALYM  --   --   --   --   --  1.0  --  2.7   < >  --    ALYMPAUTO  --   --  0.9 1.8   < >  --    < >  --    < >  --    KATHY  --   --   --   --   --  1.0  --  1.0   < >  --    AMONOAUTO  --   --  1.3 0.7   < >  --    < >  --    < >  --    AEOS  --   --   --   --   --  0.0  --  0.2   < >  --    AEOSAUTO  --   --  0.0 0.0   < >  --    < >  --    < >  --    ABSBASO  --   --  0.0 0.0   < >  --    < >  --    < >  --    HGB 9.4* 10.5* 10.8* 10.6*   < > 10.2*   < > 11.1*   < >  --    58240  --   --   --   --   --   --   --   --   --  11.9   HCT 31.0* 34.2* 34.2* 32.7*   < > 32.2*   < > 35.0   < >  --     323 346 395   < > 418   < > 388   < >  --    58634  --   --   --   --   --   --   --   --   --  406    < > = values in this interval not displayed.       Strongyloides testing  Recent Labs   Lab Test 03/08/25  1606 02/06/25  1600 01/06/25  2306 01/03/25  1731  "10/11/24  2212 09/30/24  1545   EOSINOPHIL 0 0   < > 0   < > 3   AEOS  --   --   --  0.0  --  0.2   AEOSAUTO 0.0 0.0   < >  --    < >  --     < > = values in this interval not displayed.       Clotting Studies    Recent Labs   Lab Test 01/04/25  1537 01/04/25  0742 01/03/25  1733 08/26/19  2331 05/18/18  0731   INR  --   --  1.23* 1.33* 1.06   PTT 31   < >  --  38* 33    < > = values in this interval not displayed.       Iron Testing    Recent Labs   Lab Test 03/10/25  0614 09/04/24  1008 06/15/23  1028 08/26/19  2331 08/05/19  1552 07/30/18  0913 07/30/18  0855   IRON  --   --   --   --  56  --  39   FEB  --   --   --   --  267  --  307   IRONSAT  --   --   --   --  21  --  13*   LAURA  --   --   --   --  118  --  18   MCV 88   < > 92   < > 88   < >  --    B12  --   --  456  --   --   --   --     < > = values in this interval not displayed.       Blood Gas Testing    Recent Labs   Lab Test 01/06/25  2302 01/03/25  1735   PHV 7.50* 7.47*   PCO2V 27* 28*   PO2V 37 42   HCO3V 21 20*        Thyroid Studies     Recent Labs   Lab Test 01/20/25  1635 11/14/24  1506 05/20/19  0957 10/16/18  0902 07/30/18  0855   TSH 1.53 3.31 12.11* 18.39* 8.64*   T4  --  1.92* 0.99 0.85 1.02   T3  --   --   --   --  44*       Urine Studies     Recent Labs   Lab Test 03/08/25  1737 02/07/25  0215 01/22/25  1727 01/21/25  1422 01/19/25  2100   URINEPH 6.0 5.5 5.5 5.5 6.0   NITRITE Positive* Negative Negative Negative Negative   LEUKEST Large* Small* Large* Large* Trace*   WBCU >182* 10-25* >182* >182* 7*   WBC CLUMPS Present*  --  Present*  --   --        CSF testing   No lab results found.    Invalid input(s): \"CADAM\", \"EVPCR\", \"ENTPCR\", \"ENTEROVIRUS\"    Medication levels    Recent Labs   Lab Test 03/10/25  0614 01/06/25  2306 09/04/24  1008 08/29/19  0544 04/17/18  0942 10/23/17  1025   VANCOMYCIN 19.7  --   --   --   --   --    VCON  --   --   --  2.2   < >  --    CYCLSP  --   --   --   --   --  Canceled, Test credited   SUE  --  4.5*   " < > 9.8   < >  --     < > = values in this interval not displayed.       Body fluid stats  No lab results found.    Microbiology:  Fungal testing  Recent Labs   Lab Test 01/20/25  1635 01/13/25  1425 01/13/25  1410   GAQNTUR  --  Not Detected  --    FGTL 37  --   --    FGTLI Negative  --   --    COFUNG  --   --  <1:2   ASPA  --   --  <1:8   HISTOMYCF  --   --  <1:8   HISTOYEACF  --   --  <1:8   FUNBL  --   --  0.6       Last Culture results   Rapid Strep A Screen   Date Value Ref Range Status   10/05/2005   Final    NEGATIVE: No Group A streptococcal antigen detected by immunoassay, await     Comment:      culture report.  Critical Value called to and read back by Theodora in Transplant clinic 13:20pm   10/5/05 ()     Culture   Date Value Ref Range Status   03/09/2025 No growth after 12 hours  Preliminary   03/09/2025 No growth after 12 hours  Preliminary   03/08/2025 No growth after 1 day  Preliminary   03/08/2025 >100,000 CFU/mL Klebsiella pneumoniae (A)  Final   03/08/2025 Positive on the 1st day of incubation (A)  Preliminary   03/08/2025 Enterococcus faecalis (AA)  Preliminary     Comment:     2 of 2 bottles   02/07/2025 10,000-50,000 CFU/mL Mixture of Urogenital Louann  Final   01/22/2025 10,000-50,000 CFU/mL Mixture of urogenital louann  Final   01/21/2025 >100,000 CFU/mL Mixture of Urogenital Louann  Final   01/20/2025 No Growth  Final   01/20/2025 No Growth  Final   01/20/2025 No Growth  Final   01/19/2025 <10,000 CFU/mL Mixture of Urogenital Louann  Final   01/19/2025 No Growth  Final   01/19/2025 Positive on the 1st day of incubation (A)  Final   01/19/2025 Staphylococcus epidermidis (AA)  Final     Comment:     1 of 2 bottles  The recovery of this organism from a single blood culture bottle most likely represents contamination. If susceptibility testing is needed, please refer to the antibiogram or contact IDDL. If contamination is suspected, it is important to repeat two sets of blood cultures from two  different sites.   01/10/2025 <10,000 CFU/mL Urogenital louann  Final   01/07/2025 No Growth  Final   01/06/2025 No Growth  Final   01/03/2025 No Growth  Final     Culture Micro   Date Value Ref Range Status   08/30/2019 No growth  Final   08/29/2019 No growth  Final   08/28/2019 No growth  Final   08/28/2019 No growth  Final   08/27/2019 10,000 to 50,000 colonies/mL  Escherichia coli   (A)  Final   08/27/2019 (A)  Final    Cultured on the 1st day of incubation:  Escherichia coli  Susceptibility testing done on previous specimen     08/27/2019   Final    Critical Value/Significant Value, preliminary result only, called to and read back by   Maria Teresa Martines RN 08/27/2019 @1425 /[facility] [place]     08/26/2019 (A)  Final    Cultured on the 1st day of incubation:  Escherichia coli     08/26/2019   Final    Critical Value/Significant Value, preliminary result only, called to and read back by  NENO Robert at 1229 8.27.19.     08/26/2019   Final    (Note)  POSITIVE for E.COLI by Verigene multiplex nucleic acid test. Final  identification and antimicrobial susceptibility testing will be  verified by standard methods. Verigene test will not distinguish  E.coli from Shigella species including S.dysenteriae, S.flexneri,  S.boydii, and S.sonnei. Specimens containing Shigella species or  E.coli will be reported as Positive for E.coli.    Specimen tested with Verigene multiplex, gram-negative blood culture  nucleic acid test for the following targets: Acinetobacter sp.,  Citrobacter sp., Enterobacter sp., Proteus sp., E. coli, K.  pneumoniae/oxytoca, P. aeruginosa, and the following resistance  markers: CTXM, KPC, NDM, VIM, IMP and OXA.    Critical Value/Significant Value called to and read back by  Flower Lujan Rn @ 1449 8.27.19          Escherichia coli   Date Value Ref Range Status   09/22/2024 Not Detected Not Detected Final     Enterococcus faecium   Date Value Ref Range Status   03/08/2025 Not Detected Not Detected Final      "      Last checks of Clostridioides difficile testing  No lab results found.    Infection Studies to assess Diarrhea No lab results found.    Invalid input(s): \"BIDYD\"    Virology:  Coronavirus-19 testing    Recent Labs   Lab Test 03/08/25  1549 01/19/25  1908 01/06/25  2309 01/03/25  1746   [id number] Negative Negative Negative Negative       Respiratory virus (non-coronavirus-19) testing    Recent Labs   Lab Test 03/08/25  1549   IFLUA Not Detected   INFZA Negative   FLUAH1 Not Detected   QJ8614 Not Detected   FLUAH3 Not Detected   IFLUB Not Detected   INFZB Negative   PIV1 Not Detected   PIV2 Not Detected   PIV3 Not Detected   PIV4 Not Detected   IRSV Negative   RSVA Not Detected   RSVB Not Detected   HMPV Not Detected   RHINEV Not Detected   ADENOV Not Detected   MOHR Not Detected       Viral loads    Recent Labs   Lab Test 01/20/25  1635 08/28/19  0528 07/30/18  0856   EBQI Not Detected  --   --    EBRES  --  2,386*  --    CMVQNT Not Detected  --  CMV DNA Not Detected   CMVLOG  --   --  Not Calculated       CMV resistance testing  No lab results found.    No results found for: \"H6RES\"    BK Virus Testing   No lab results found.    Parvovirus Testing    Recent Labs   Lab Test 01/20/25  1635   PRVG 1.82*   PRVM 0.16   PRVPC Not Detected       Adenovirus Testing  No lab results found.    Invalid input(s): \"ADENAB\", \"ADENOVIRUS\", \"ADQT\"    Imaging:  Recent Results (from the past 48 hours)   XR Chest 1 View    Narrative    Exam: XR CHEST 1 VIEW, 3/8/2025 4:35 PM    Comparison: 1/19/2025    History: Fever    Findings:    Single AP portable chest.    Trachea is midline. Stable cardiac mediastinal silhouette. Similar  metallic opacity/device projecting over the left chestThere is no  discernible pneumothorax or pleural effusion. streaky right basilar  opacities      Impression    Impression: Streaky right basilar opacities, may represent  atelectasis, scarring or infiltrate in the appropriate clinical  settings.    I " have personally reviewed the examination and initial interpretation  and I agree with the findings.    ROSENDO HIRSCH MD         SYSTEM ID:  [id number]   CT Abdomen Pelvis w/o Contrast    Narrative    EXAMINATION: CT ABDOMEN PELVIS W/O CONTRAST, 3/8/2025 6:38 PM    INDICATION: History liver transplant, fever, mildly elevated LFTs  evaluate cholangitis patient declining contrast    COMPARISON STUDY: CT chest, abdomen and pelvis 1/22/2025    TECHNIQUE: CT scan of the abdomen and pelvis was performed on  multidetector CT scanner using volumetric acquisition technique and  images were reconstructed in multiple planes with variable thickness  and reviewed on dedicated workstations.     CONTRAST: None    CT scan radiation dose is optimized to minimum requisite dose using  automated dose modulation techniques.    FINDINGS:      Similar right lung base calcifications and nodular, groundglass and  streaky densities. No pleural effusion. No pericardial effusion.    Limited evaluation of abdominal parenchymal organs due to noncontrast  technique.     Postsurgical changes of liver transplant. Curvilinear hyperdensity  seen along the inferior right lobe (3/67 and 4/42, may represent  peripheral biliary ductal dilatation or edema, similar to more  conspicuous compared to prior CT from 1/22/2025 . Gallbladder is  surgically absent.      Unremarkable noncontrast appearance of the adrenal glands, spleen  atrophic appearing pancreas. Similar likely pancreatic cyst measuring  1.4 cm, stable. No pancreatic ductal dilatation. Scattered  atherosclerotic calcification of the abdominal aorta without  aneurysmal dilatation. Colonic diverticulosis without evidence for  diverticulitis. Small fat-containing ventral hernia.  No high-grade bowel obstruction. No ascites. Similar vascular  calcification/nonobstructive renal calculi. No new renal mass or  hydronephrosis.. No pelvic mass. Similar heterogeneous osseous  density. No new acute or  "aggressive osseous lesion.          Impression    IMPRESSION:       1. Postsurgical changes of liver transplant, subtle curvilinear  hypodensity in the inferior liver may represent peripheral biliary  ductal dilatation or edema, similar to slightly more conspicuous  compared to prior CT scan; if persistent clinical concern for  cholangitis, consider gastroenterology consult and/or MRCP.  2. Fluid density 1.4 cm lesion in the pancreatic head region, may  represent pancreatic cyst/IPMN, consider follow-up as suggested  below..  3. Diverticulosis without definite diverticulitis  4. Additional findings similar to prior CT from 2025, including  right lung base calcifications and  densities.    International evidence-based [place] guidelines for the management of  intraductal papillary mucinous neoplasm of the pancreas:   Surveillance is recommended if no high risk stigmata or worrisome  features are present:  Primary imaging modalities recommended are MRI/MRCP and MDCT  Size of largest cyst:   *  Less than 20 mm: Follow-up imaging in 6 months once, then every 18  months if no change. Stop surveillance if stable for 5 years.  *  More than 20 mm but less than 30 mm: Follow-up imaging at 6 months,  12 months, then yearly if no change.   *  More than 30 mm- follow up imaging every 6 months.    GI consultation for surgery/endoscopic ultrasound is recommended for  cysts with \"high-risk stigmata\" of high grade dysplasia or invasive  carcinoma such as:  1. Obstructive jaundice in a patient with cystic lesion of the head of  the pancreas  2. Enhancing mural nodule > 5mm or solid component  3. Main pancreatic duct >10mm  4. Suspicious or positive results of cytology    If worrisome features are present, GI consultation is recommended.  Worrisome features on imagin. Cyst more than or equal to 30 mm  2. Thickened or enhancing cyst walls  3. Main pancreatic duct > 5mm and < 10mm.  4. Abrupt change in caliber of pancreatic " duct with distal atrophy  5. Lymphadenopathy  6. Cyst growth rate > 2.5mm/year    *Reference: International evidence-based [place] guidelines for the  management of  intraductal papillary mucinous neoplasm of the pancreas Pancreatology:  24:(); 255-270.  [email or web address]://[email or web address]/j.mccracken.[email or web address]    I have personally reviewed the examination and initial interpretation  and I agree with the findings.    ROSENDO HIRSCH MD         SYSTEM ID:  [id number]   Echocardiogram Complete   Result Value    LVEF  60-65%    Narrative    [id number]  [id number]  [id number]  [id number]^MARY JO^AVERY^MATILDE     [facility],[place]  Echocardiography Laboratory  [address] [id number]     Name: ISA CAMARGO  MRN: [id number]  : 1950  Study Date: 03/10/2025 09:11 AM  Age: 74 yrs  Gender: Female  Patient Location: [facility]  Reason For Study: Endocarditis  Ordering Physician: AVERY CAMARGO  Performed By: Renetta Melara     BSA: 1.9 m2  Height: 67 in  Weight: 170 lb  HR: 70  BP: 147/79 mmHg  ______________________________________________________________________________  Procedure  Echocardiogram with two-dimensional, color and spectral Doppler. Poor acoustic  windows.  ______________________________________________________________________________  Interpretation Summary  No vegetation or mass identified. Global and regional left ventricular  function is normal with an EF of 60-65%.  Right ventricular function, chamber size, wall motion, and thickness are  normal.  The inferior vena cava is normal.  No pericardial effusion is present.  ______________________________________________________________________________  Left Ventricle  Global and regional left ventricular function is normal with an EF of 60-65%.  Left ventricular wall thickness is normal. Left ventricular size is normal.  Diastolic function not assessed due to significant mitral annular  calcification. No regional wall motion  abnormalities are seen.     Right Ventricle  Right ventricular function, chamber size, wall motion, and thickness are  normal.     Atria  Both atria appear normal.     Mitral Valve  Moderate mitral annular calcification is present. Trace to mild mitral  insufficiency is present.     Aortic Valve  Aortic valve sclerosis is present. Trace to mild aortic insufficiency is  present.     Tricuspid Valve  The tricuspid valve is normal. Mild tricuspid insufficiency is present. The  right ventricular systolic pressure is approximated at 11.8 mmHg plus the  right atrial pressure. Pulmonary artery systolic pressure is normal.     Pulmonic Valve  The pulmonic valve is normal.     Vessels  The thoracic aorta cannot be assessed. The pulmonary artery cannot be  assessed. The inferior vena cava is normal.     Pericardium  No pericardial effusion is present.     ______________________________________________________________________________  MMode/2D Measurements & Calculations  LVOT diam: 1.9 cm  LVOT area: 2.7 cm2     Doppler Measurements & Calculations  Ao V2 max: 153.4 cm/sec  Ao max P.4 mmHg  Ao V2 mean: 101.0 cm/sec  Ao mean P.6 mmHg  Ao V2 VTI: 34.9 cm  MAYUR(I,D): 1.6 cm2  MAYUR(V,D): 1.7 cm2  LV V1 max PG: 3.8 mmHg  LV V1 max: 97.8 cm/sec  LV V1 VTI: 20.7 cm  SV(LVOT): 55.7 ml  SI(LVOT): 29.5 ml/m2  TR max nate: 171.6 cm/sec  TR max P.8 mmHg  AV Nate Ratio (DI): 0.64  MAYUR Index (cm2/m2): 0.85     ______________________________________________________________________________  Report approved by: JAY Fishman MD on 03/10/2025 10:14 AM

## 2025-03-10 NOTE — TELEPHONE ENCOUNTER
Kirill Zuluaga MD Hiljus, Audrey G, RN3 days ago     Increase hydralazine to 50 mg 3 times daily     ================================================    Writer called patient who is currently hospitalized at [facility] for sepsis and UTI. Patient instructed to follow directions per hospital team and notify Cardiology clinic with any concerns on discharge. Patient appreciates call and verbalizes understanding.

## 2025-03-10 NOTE — PROGRESS NOTES
GASTROENTEROLOGY PROGRESS NOTE    Date of Admission: 3/8/2025  Reason for Admission: immunosuppression management    ASSESSMENT:  Luz Thompson is a 74 year old female admitted on 3/8/2025. She medical history is significant for primary biliary cirrhosis s/p liver transplant (2002) (on sirolimus, prednisone 10 mg daily and ursodiol), paroxysmal atrial fibrillation on Eliquis, history of CVA, CKD IIIb, diabetes mellitus type 2 not on insulin, hypothyroidism, hypertension, and PAD who presented to the hospital for fever of 102  F along with chills.     # History of PBC s/p Ltxp 2002  # Current Klebsiella UTI, history of recurrent UTI  # E. Faecalis bacteremia   # CKD 3B, diabetes mellitus  # Urinary tract infection infection  # Elevated transaminases     Patient presented with fever and chills along with UA consistent with UTI.  She has systemic symptoms as manifested by fever and leukocytosis.     CTAP prelim read: Postsurgical changes of liver transplant, subtle curvilinear  hypodensity in the inferior liver may represent peripheral biliary ductal dilatation or edema.     Similar likely pancreatic cyst measuring 1.4 cm, stable. No pancreatic ductal dilatation.      Initial LFTs: AST 61, ALT 58, TB 0.5  Bcx: E. Faecalis   Ucx: Klebsiella    RECOMMENDATIONS:  - Continue immunosuppression at current dose.  - Treatment of bacteremia and UTI, per ID.  - No signs of acute cholangitis. But recommend outpatient MRCP to evaluate bile ducts more closely. Pancreatic cyst can be assessed at that time as well, otherwise that will require MRI follow up in 1 year.  - Continue to monitor LFTs.    Thank you for involving us in this patient's care. Please do not hesitate to contact the GI service with any questions or concerns.     The patient was discussed and plan agreed upon with GI staff, Dr. Bradley.    Gayatri Antunez MD MPH  GI Fellow  Division of Gastroenterology, Hepatology, and Nutrition  [facility]  [place]    Physician Attestation   I saw this patient with the resident and agree with the resident/fellow's findings and plan of care as documented in the note.      Please see A&P for additional details of medical decision making.    I have personally reviewed the following data over the past 24 hrs:    8.9  \   9.4 (L)   / 305     141 109 (H) 39.0 (H) /  106 (H)   3.9 21 (L) 1.43 (H) \     ALT: 87 (H) AST: 37 AP: 97 TBILI: 0.2   ALB: 3.0 (L) TOT PROTEIN: 5.7 (L) LIPASE: N/A         Trisha Bradley MD  Date of Service (when I saw the patient): 03/10/25    _______________________________________________________________      Subjective:   - Nursing notes and 24hr events reviewed.   - NAEO.   - Patient states she is feeling better this AM.   - She denies any fevers, chills, N/V.    ROS:   4 pt ROS negative unless noted in subjective.     Objective:  Blood pressure (!) 156/66, pulse 70, temperature 98.1  F (36.7  C), temperature source Oral, resp. rate 16, last menstrual period 06/01/1988, SpO2 99%, not currently breastfeeding.    Gen: no acute distress  HEENT: NCAT. Conjunctiva clear. Sclera anicteric   Resp: Nonlabored work of breathing  Abd: Soft, NT, ND, no guarding or rebound   Msk: no gross deformity  Skin:  No jaundice  Ext: warm, well perfused   Neuro: grossly normal  Mental status/Psych: A&O. Asks/answers questions appropriately           LABS:  BMP  Recent Labs   Lab 03/10/25  0614 03/09/25  1720 03/09/25  1043 03/08/25  1606     --  141 138   POTASSIUM 3.9  --  4.0 4.4   CHLORIDE 109*  --  108* 103   KEILY 9.0  --  8.9 10.4   CO2 21*  --  20* 24   BUN 39.0*  --  38.5* 51.3*   CR 1.43*  --  1.34* 1.56*   * 217* 136* 183*     CBC  Recent Labs   Lab 03/10/25  0614 03/09/25  1043 03/08/25  1606   WBC 8.9 15.1* 18.9*   RBC 3.53* 3.86 4.00   HGB 9.4* 10.5* 10.8*   HCT 31.0* 34.2* 34.2*   MCV 88 89 86   MCH 26.6 27.2 27.0   MCHC 30.3* 30.7* 31.6   RDW 17.2* 17.2* 17.1*    323 346     INRNo lab  "results found in last 7 days.  LFTs  Recent Labs   Lab 03/10/25  0614 03/09/25  1043 03/08/25  1606   ALKPHOS 97 102 110   AST 37 46* 58*   ALT 87* 84* 61*   BILITOTAL 0.2 0.4 0.5   PROTTOTAL 5.7* 6.1* 6.5   ALBUMIN 3.0* 3.2* 3.5      PANCNo lab results found in last 7 days.      PROCEDURES: none    IMAGING:    CT A/P 3/8/25  IMPRESSION:       1. Postsurgical changes of liver transplant, subtle curvilinear  hypodensity in the inferior liver may represent peripheral biliary  ductal dilatation or edema, similar to slightly more conspicuous  compared to prior CT scan; if persistent clinical concern for  cholangitis, consider gastroenterology consult and/or MRCP.  2. Fluid density 1.4 cm lesion in the pancreatic head region, may  represent pancreatic cyst/IPMN, consider follow-up as suggested  below..  3. Diverticulosis without definite diverticulitis  4. Additional findings similar to prior CT from 1/22/2025, including  right lung base calcifications and  densities.     International evidence-based [place] guidelines for the management of  intraductal papillary mucinous neoplasm of the pancreas:   Surveillance is recommended if no high risk stigmata or worrisome  features are present:  Primary imaging modalities recommended are MRI/MRCP and MDCT  Size of largest cyst:   *  Less than 20 mm: Follow-up imaging in 6 months once, then every 18  months if no change. Stop surveillance if stable for 5 years.  *  More than 20 mm but less than 30 mm: Follow-up imaging at 6 months,  12 months, then yearly if no change.   *  More than 30 mm- follow up imaging every 6 months.     GI consultation for surgery/endoscopic ultrasound is recommended for  cysts with \"high-risk stigmata\" of high grade dysplasia or invasive  carcinoma such as:  1. Obstructive jaundice in a patient with cystic lesion of the head of  the pancreas  2. Enhancing mural nodule > 5mm or solid component  3. Main pancreatic duct >10mm  4. Suspicious or positive " results of cytology     If worrisome features are present, GI consultation is recommended.  Worrisome features on imagin. Cyst more than or equal to 30 mm  2. Thickened or enhancing cyst walls  3. Main pancreatic duct > 5mm and < 10mm.  4. Abrupt change in caliber of pancreatic duct with distal atrophy  5. Lymphadenopathy  6. Cyst growth rate > 2.5mm/year     *Reference: International evidence-based [place] guidelines for the  management of  intraductal papillary mucinous neoplasm of the pancreas Pancreatology:  24:(); 255-270.  [email or web address]/j.[email or web address]

## 2025-03-11 ENCOUNTER — APPOINTMENT (OUTPATIENT)
Dept: PHYSICAL THERAPY | Facility: CLINIC | Age: 75
DRG: 871 | End: 2025-03-11
Payer: MEDICARE

## 2025-03-11 LAB
ALBUMIN SERPL BCG-MCNC: 3.3 G/DL (ref 3.5–5.2)
ALP SERPL-CCNC: 108 U/L (ref 40–150)
ALT SERPL W P-5'-P-CCNC: 86 U/L (ref 0–50)
ANION GAP SERPL CALCULATED.3IONS-SCNC: 19 MMOL/L (ref 7–15)
AST SERPL W P-5'-P-CCNC: 92 U/L (ref 0–45)
BACTERIA BLD CULT: ABNORMAL
BACTERIA BLD CULT: ABNORMAL
BACTERIA UR CULT: ABNORMAL
BILIRUB SERPL-MCNC: 0.6 MG/DL
BUN SERPL-MCNC: 48.1 MG/DL (ref 8–23)
CALCIUM SERPL-MCNC: 8.8 MG/DL (ref 8.8–10.4)
CHLORIDE SERPL-SCNC: 105 MMOL/L (ref 98–107)
CREAT SERPL-MCNC: 1.19 MG/DL (ref 0.51–0.95)
CREAT SERPL-MCNC: 1.49 MG/DL (ref 0.51–0.95)
EGFRCR SERPLBLD CKD-EPI 2021: 36 ML/MIN/1.73M2
EGFRCR SERPLBLD CKD-EPI 2021: 47 ML/MIN/1.73M2
ERYTHROCYTE [DISTWIDTH] IN BLOOD BY AUTOMATED COUNT: 16.9 % (ref 10–15)
GLUCOSE BLDC GLUCOMTR-MCNC: 141 MG/DL (ref 70–99)
GLUCOSE BLDC GLUCOMTR-MCNC: 155 MG/DL (ref 70–99)
GLUCOSE BLDC GLUCOMTR-MCNC: 159 MG/DL (ref 70–99)
GLUCOSE SERPL-MCNC: 188 MG/DL (ref 70–99)
HCO3 SERPL-SCNC: 15 MMOL/L (ref 22–29)
HCT VFR BLD AUTO: 32.7 % (ref 35–47)
HGB BLD-MCNC: 9.8 G/DL (ref 11.7–15.7)
HOLD SPECIMEN: NORMAL
MCH RBC QN AUTO: 26.8 PG (ref 26.5–33)
MCHC RBC AUTO-ENTMCNC: 30 G/DL (ref 31.5–36.5)
MCV RBC AUTO: 90 FL (ref 78–100)
PLATELET # BLD AUTO: 305 10E3/UL (ref 150–450)
POTASSIUM SERPL-SCNC: 4.1 MMOL/L (ref 3.4–5.3)
PROT SERPL-MCNC: 6.1 G/DL (ref 6.4–8.3)
RBC # BLD AUTO: 3.65 10E6/UL (ref 3.8–5.2)
SODIUM SERPL-SCNC: 139 MMOL/L (ref 135–145)
VANCOMYCIN SERPL-MCNC: 19.4 UG/ML
WBC # BLD AUTO: 7.1 10E3/UL (ref 4–11)

## 2025-03-11 PROCEDURE — 99207 PR APP CREDIT; MD BILLING SHARED VISIT: CPT | Performed by: PHYSICIAN ASSISTANT

## 2025-03-11 PROCEDURE — 80048 BASIC METABOLIC PNL TOTAL CA: CPT | Performed by: PEDIATRICS

## 2025-03-11 PROCEDURE — 97116 GAIT TRAINING THERAPY: CPT | Mod: GP

## 2025-03-11 PROCEDURE — 85018 HEMOGLOBIN: CPT | Performed by: PHYSICIAN ASSISTANT

## 2025-03-11 PROCEDURE — 250N000013 HC RX MED GY IP 250 OP 250 PS 637: Performed by: PHYSICIAN ASSISTANT

## 2025-03-11 PROCEDURE — 36415 COLL VENOUS BLD VENIPUNCTURE: CPT | Performed by: PEDIATRICS

## 2025-03-11 PROCEDURE — 82565 ASSAY OF CREATININE: CPT

## 2025-03-11 PROCEDURE — 82565 ASSAY OF CREATININE: CPT | Performed by: PEDIATRICS

## 2025-03-11 PROCEDURE — 82962 GLUCOSE BLOOD TEST: CPT

## 2025-03-11 PROCEDURE — 250N000012 HC RX MED GY IP 250 OP 636 PS 637: Performed by: STUDENT IN AN ORGANIZED HEALTH CARE EDUCATION/TRAINING PROGRAM

## 2025-03-11 PROCEDURE — 99233 SBSQ HOSP IP/OBS HIGH 50: CPT | Mod: FS | Performed by: PEDIATRICS

## 2025-03-11 PROCEDURE — 97530 THERAPEUTIC ACTIVITIES: CPT | Mod: GP

## 2025-03-11 PROCEDURE — 80202 ASSAY OF VANCOMYCIN: CPT | Performed by: PEDIATRICS

## 2025-03-11 PROCEDURE — 86140 C-REACTIVE PROTEIN: CPT | Performed by: STUDENT IN AN ORGANIZED HEALTH CARE EDUCATION/TRAINING PROGRAM

## 2025-03-11 PROCEDURE — 250N000011 HC RX IP 250 OP 636: Performed by: PHYSICIAN ASSISTANT

## 2025-03-11 PROCEDURE — 120N000002 HC R&B MED SURG/OB UMMC

## 2025-03-11 PROCEDURE — 36415 COLL VENOUS BLD VENIPUNCTURE: CPT | Performed by: PHYSICIAN ASSISTANT

## 2025-03-11 PROCEDURE — 99233 SBSQ HOSP IP/OBS HIGH 50: CPT | Mod: GC | Performed by: INTERNAL MEDICINE

## 2025-03-11 PROCEDURE — 85048 AUTOMATED LEUKOCYTE COUNT: CPT | Performed by: PHYSICIAN ASSISTANT

## 2025-03-11 PROCEDURE — 258N000003 HC RX IP 258 OP 636: Performed by: HOSPITALIST

## 2025-03-11 PROCEDURE — 250N000013 HC RX MED GY IP 250 OP 250 PS 637: Performed by: STUDENT IN AN ORGANIZED HEALTH CARE EDUCATION/TRAINING PROGRAM

## 2025-03-11 PROCEDURE — 250N000011 HC RX IP 250 OP 636: Performed by: HOSPITALIST

## 2025-03-11 RX ORDER — HYDRALAZINE HYDROCHLORIDE 25 MG/1
25 TABLET, FILM COATED ORAL 3 TIMES DAILY
Status: DISCONTINUED | OUTPATIENT
Start: 2025-03-11 | End: 2025-03-11

## 2025-03-11 RX ORDER — HYDRALAZINE HYDROCHLORIDE 25 MG/1
25 TABLET, FILM COATED ORAL 3 TIMES DAILY
Status: DISCONTINUED | OUTPATIENT
Start: 2025-03-11 | End: 2025-03-14 | Stop reason: HOSPADM

## 2025-03-11 RX ORDER — PREDNISONE 5 MG/1
5 TABLET ORAL DAILY
Status: DISCONTINUED | OUTPATIENT
Start: 2025-03-12 | End: 2025-03-14 | Stop reason: HOSPADM

## 2025-03-11 RX ADMIN — METOPROLOL SUCCINATE 25 MG: 25 TABLET, EXTENDED RELEASE ORAL at 09:35

## 2025-03-11 RX ADMIN — APIXABAN 5 MG: 5 TABLET, FILM COATED ORAL at 09:35

## 2025-03-11 RX ADMIN — SIROLIMUS 1 MG: 1 TABLET, FILM COATED ORAL at 09:36

## 2025-03-11 RX ADMIN — HYDRALAZINE HYDROCHLORIDE 25 MG: 25 TABLET ORAL at 21:23

## 2025-03-11 RX ADMIN — URSODIOL 250 MG: 250 TABLET ORAL at 19:53

## 2025-03-11 RX ADMIN — ACETAMINOPHEN 650 MG: 325 TABLET, FILM COATED ORAL at 14:01

## 2025-03-11 RX ADMIN — CALCITRIOL CAPSULES 0.25 MCG 0.25 MCG: 0.25 CAPSULE ORAL at 09:36

## 2025-03-11 RX ADMIN — PREDNISONE 10 MG: 5 TABLET ORAL at 09:35

## 2025-03-11 RX ADMIN — LINAGLIPTIN 5 MG: 5 TABLET, FILM COATED ORAL at 09:36

## 2025-03-11 RX ADMIN — LEVOTHYROXINE SODIUM 175 MCG: 0.17 TABLET ORAL at 09:36

## 2025-03-11 RX ADMIN — VANCOMYCIN HYDROCHLORIDE 750 MG: 500 INJECTION, POWDER, LYOPHILIZED, FOR SOLUTION INTRAVENOUS at 19:54

## 2025-03-11 RX ADMIN — ERTAPENEM SODIUM 1 G: 1 INJECTION, POWDER, LYOPHILIZED, FOR SOLUTION INTRAMUSCULAR; INTRAVENOUS at 05:56

## 2025-03-11 RX ADMIN — OMEGA-3-ACID ETHYL ESTERS 1 G: 1 CAPSULE, LIQUID FILLED ORAL at 09:36

## 2025-03-11 RX ADMIN — URSODIOL 250 MG: 250 TABLET ORAL at 09:36

## 2025-03-11 RX ADMIN — EZETIMIBE 10 MG: 10 TABLET ORAL at 09:37

## 2025-03-11 RX ADMIN — GABAPENTIN 300 MG: 250 SOLUTION ORAL at 21:24

## 2025-03-11 RX ADMIN — FOLIC ACID 1000 MCG: 1 TABLET ORAL at 09:35

## 2025-03-11 RX ADMIN — APIXABAN 5 MG: 5 TABLET, FILM COATED ORAL at 19:53

## 2025-03-11 RX ADMIN — OMEGA-3-ACID ETHYL ESTERS 1 G: 1 CAPSULE, LIQUID FILLED ORAL at 19:53

## 2025-03-11 RX ADMIN — HYDRALAZINE HYDROCHLORIDE 25 MG: 25 TABLET ORAL at 16:22

## 2025-03-11 ASSESSMENT — ACTIVITIES OF DAILY LIVING (ADL)
ADLS_ACUITY_SCORE: 62

## 2025-03-11 NOTE — PROGRESS NOTES
[facility]  Transplant Infectious Disease Progress Note     Patient:  Luz Thompson, Date of birth 1950, Medical record number [id number]  Date of Visit:  03/11/2025         Assessment and Recommendations:   Recommendations:  - Continue ertapenem 1g IV daily for Klebsiella pneumoniae in the urine while admitted  - Stop vancomycin  - Start linezolid 600 mg PO BID for Enterococcus faecalis in the blood, complete a 14 day course from the first day of negative blood cultures 3/9-3/22  - Recommend a surveillance urine culture after the completion of treatment    Transplant Infectious Disease will continue to follow with you.      Patient was discussed with transplant ID attending Dr Montero.     Katelyn Dawn MD  Infectious diseases PGY-4  Contact via Gtxh    Assessment:  #Enterococcus faecalis bacteremia  #Klebsiella pneumoniae in urine culture  Presenting from home with fever of 102F. She has previously had workup for fever of unknown origin without obvious etiology determine. She presents with fever without clear associated symptoms but on workup was found to have Enterococcus faecalis in 2/2 blood cultures. UA with +leuk est, WBC > 182, +nitrite. Respiratory panel, COVID/influenza/RSV negative. Urine culture with Klebsiella pneumoniae, although denies dysuria, CVA tenderness, or other UTI symptoms, only fever. CT abd/pelvis only noted subtle curvilinear hypodensity in the inferior liver and no current abdominal symptoms. TTE performed due to previous concern for FUO, result was unremarkable, HENRIQUE score 1 and overall not high risk for endocarditis. She does not have any implanted hardware aside from a loop recorder which as not caused issues and cultures quickly cleared. Her antibiotic allergies complicate antibiotic choices here, although appears to have been prescribed linezolid 11/2023 and appears to have tolerated it and the Enterococcus isolate is susceptible and she is  "willing to trial a first dose in the hospital. She recently had an episode with multiple loose stools and abdominal pain prior to admission, possibly this was translocation from the gut, will plan to treat for 2 weeks. Without urinary symptoms but with pyuria, will treat the Klebsiella pneumoniae isolate with ertapenem while admitted as the only oral option she can tolerate fosfomycin is resistant.    #Hx coccidioides  Dx in winter [id number], did not tolerate fluconazole due to severe rash. Treated with voriconazole, continued until her return to [place]. CXR with stable calcifications. Recent fungal antibody panel negative though unclear how reliable this is. Voriconazole was stopped 6/2024.     #Hx EBV viremia s/p rituxan 2016  No lymphadenopathy on recent abdomen/pelvis CT. Not detected 1/20/2025     #Numerous reported antimicrobial allergies  Per allergy note 8/2/2019 \"Prick and intradermal and patch testing to fluconazole, doxycycline, azithromycin, penicillins, and cephalosporins with immediate and delayed readings being negative. \" States she will never take fluconazole again. Would be candidate for oral provocation testing in future with pcn. Also notes an allergy to the  capsules, states tolerates pills or liquid formulations, sometimes needs them compounded.    Infectious Disease issues include:  - QTc interval: 410 on 3/10/25  - Bacterial coverage: as above  - Pneumocystis prophylaxis: none  - Serostatus & viral prophylaxis: EBV R+, CMV R-  - Fungal prophylaxis: none, previously voriconazole  - Risk factors to suggest check of Toxoplasma, Strongy, or Schisto serology?: toxoplasma negative 1/20/25  - Immunization status: Up to date on influenza, COVID; due for Tdap  - Gamma globulin status: 1110 on 8/5/2019  - Isolation status: Good hand hygiene. Contact for ESBL history  - Code status: Full Code         Interval History:   No acute events overnight.  No dysuria. Overall is feeling well, has no new " complaints. Feeling fatigued. She had some back pain which resolved now that she is in a hospital bed.   No fevers. No leukocytosis. No further positive blood cultures. The Klebsiella pneumo isolate is pan-S except ampicillin and fosfomycin. E faecalis is pan-S, including linezolid.      Transplants:  5/22/2002 (Liver), Postoperative day:  8329.  Coordinator Angelika Frausto    Review of Systems:  CONSTITUTIONAL:  tired, no fevers or chills  EYES:  ENT:   RESPIRATORY:  no cough or shortness of breath  CARDIOVASCULAR:  no chest pain  GASTROINTESTINAL:  no recent diarrhea or constipation  GENITOURINARY:  no dysuria, no flank pain  HEME:  INTEGUMENT:  known SCC on left cheek  NEURO:  no headache         Current Medications & Allergies:     Current Facility-Administered Medications   Medication Dose Route Frequency Provider Last Rate Last Admin    apixaban ANTICOAGULANT (ELIQUIS) tablet 5 mg  5 mg Oral BID Madhu Quiñones MD   5 mg at 03/11/25 0935    calcitRIOL (ROCALTROL) capsule 0.25 mcg  0.25 mcg Oral Daily Madhu Quiñones MD   0.25 mcg at 03/11/25 0936    ertapenem (INVanz) 1 g vial to attach to  mL bag  1 g Intravenous Q24H Sindhu Thompson PA-C   Stopped at 03/11/25 0730    ezetimibe (ZETIA) tablet 10 mg  10 mg Oral Daily Madhu Quiñones MD   10 mg at 03/11/25 0937    folic acid (FOLVITE) tablet 1,000 mcg  1,000 mcg Oral Daily Madhu Quiñones MD   1,000 mcg at 03/11/25 0935    gabapentin (NEURONTIN) solution 300 mg  300 mg Oral At Bedtime Madhu Quiñones MD   300 mg at 03/10/25 2140    hydrALAZINE (APRESOLINE) tablet 25 mg  25 mg Oral TID Karin Cast PA-C   25 mg at 03/11/25 1622    ketoconazole (NIZORAL) 2 % shampoo   Topical Once per day on Monday Wednesday Friday Madhu Quiñones MD        levothyroxine (SYNTHROID/LEVOTHROID) tablet 175 mcg  175 mcg Oral Daily Madhu Quiñones MD   175 mcg at 03/11/25 0936    linagliptin (TRADJENTA) tablet 5 mg  5 mg Oral Daily Madhu Quiñones MD   5 mg  at 03/11/25 0936    metoprolol succinate ER (TOPROL XL) 24 hr tablet 25 mg  25 mg Oral Daily Karin Cast PA-C   25 mg at 03/11/25 0935    omega-3 acid ethyl esters (LOVAZA) capsule 1 g  1 g Oral BID Ashley Tamez PA-C   1 g at 03/11/25 0936    [START ON 3/12/2025] predniSONE (DELTASONE) tablet 5 mg  5 mg Oral Daily Karin Cast PA-C        sirolimus (GENERIC EQUIVALENT) tablet 1 mg  1 mg Oral Daily Madhu Quiñones MD   1 mg at 03/11/25 0936    sodium chloride (PF) 0.9% PF flush 3 mL  3 mL Intracatheter Q8H Madhu Quiñones MD   3 mL at 03/11/25 1131    ursodiol (ACTIGALL) tablet 250 mg  250 mg Oral BID Madhu Quiñones MD   250 mg at 03/11/25 0936    vancomycin (VANCOCIN) 750 mg in sodium chloride 0.9 % 282.5 mL intermittent infusion  750 mg Intravenous Q24H Ha Olivas MD   Stopped at 03/11/25 0000       Infusions/Drips:  Current Facility-Administered Medications   Medication Dose Route Frequency Provider Last Rate Last Admin    Patient is already receiving anticoagulation with heparin, enoxaparin (LOVENOX), warfarin (COUMADIN)  or other anticoagulant medication   Does not apply Continuous PRN Madhu Quiñones MD           Allergies   Allergen Reactions    Fluconazole Hives and Itching     Full body hives  **Intradermal skin testing negative**  [See intradermal skin testing results from 8/2/2019]    Mycophenolate Diarrhea and Nausea and Vomiting     Patient stated it was chronic and lasted months      Penicillins Anaphylaxis, Hives, Itching and Rash     **Intradermal skin testing negative**  [See intradermal skin testing results from 8/2/2019]      Simvastatin Muscle Pain (Myalgia)     severe  Other reaction(s): Myalgia caused by statin    Methotrexate Other (See Comments)     Other reaction(s): Sore  Sores in mouth, esophagus, and stomach.       Morphine And Codeine Itching and Other (See Comments)     Psych disturbance  Other reaction(s): Confusion, Mood alteration    Quinolones  Anxiety, Dizziness, Headache, Other (See Comments), Palpitations and Unknown     Other reaction(s): Hyperactive behavior, Lightheadedness, Mood alteration    Dizzy, light headed    Dizziness, shaky, and jumpy    Benadryl [Diphenhydramine Hcl]      Insomnia     Capsules, Empty Gelatin [Gelatin]     Lansoprazole Diarrhea    Azithromycin Itching     [See intradermal skin testing results from 8/2/2019]    Bactrim [Sulfamethoxazole-Trimethoprim] Other (See Comments)     Numb mouth, tingling lips (treated with anti-histamines)    Cephalosporins Itching     [See intradermal skin testing results from 8/2/2019]    Ciprofloxacin Hcl Other (See Comments) and Dizziness     Insomnia, mood lability, Irregular heart beat         Doxycycline Itching and Unknown     [See intradermal skin testing results from 8/2/2019]    Lisinopril Cough    Omeprazole Itching    Tolectin [Nsaids] Rash    Tolmetin Rash and Itching    Tramadol Rash, Hives and Itching            Physical Exam:   Patient Vitals for the past 24 hrs:   BP Temp Temp src Pulse Resp SpO2   03/11/25 1616 (!) 142/67 98.4  F (36.9  C) Oral 71 20 98 %   03/11/25 1127 (!) 154/67 98.1  F (36.7  C) Axillary 69 19 98 %   03/11/25 0929 (!) 149/65 97.9  F (36.6  C) Oral 67 15 99 %   03/11/25 0431 (!) 152/67 98.2  F (36.8  C) Oral 64 12 99 %   03/10/25 2140 (!) 147/68 98.1  F (36.7  C) Oral 81 17 94 %   03/10/25 1710 -- 98.5  F (36.9  C) Oral -- -- --   03/10/25 1700 (!) 165/88 -- -- 88 -- 96 %     Ranges for vital signs:  Temp:  [97.9  F (36.6  C)-98.5  F (36.9  C)] 98.4  F (36.9  C)  Pulse:  [64-88] 71  Resp:  [12-20] 20  BP: (142-165)/(65-88) 142/67  Cuff Mean (mmHg):  [90-91] 91  SpO2:  [94 %-99 %] 98 %  There were no vitals filed for this visit.    Physical Examination:  GENERAL:  well-developed, well-nourished female, resting in bed in no acute distress.  HEAD:  Head is normocephalic, atraumatic.   EYES:  Eyes have anicteric sclerae without conjunctival injection   ENT:   Oropharynx is moist without exudates or ulcers.  NECK:  Supple.   LUNGS:  Clear to auscultation bilateral.   CARDIOVASCULAR:  Regular rate and rhythm with no murmur  ABDOMEN:  Normal bowel sounds, soft, nontender.   BACK: No CVA tenderness  SKIN:  No acute rashes. 2cm raised plaque on cheek, known SCC. PIV in place without any surrounding erythema or exudate.  NEUROLOGIC:  Grossly nonfocal. Active x4 extremities         Laboratory Data:     Absolute CD4   Date Value Ref Range Status   08/19/2014 415 mm3 Final   09/16/2013 1,266 mm3 Final   09/15/2013 DUPLICATE,TESTING DONE ON SPECIMEN FROM 9.16.13 mm3 Final   11/13/2008 1332 mm3 Final       Inflammatory & Autoimmune Markers    Recent Labs   Lab Test 01/13/25  1410 06/23/23  1422 06/13/23  1820 08/26/19  2331 05/20/19  0957 07/30/18  0855   SED  --   --   --  78* 47* 73*   CRP  --   --   --  180.0* <2.9 5.2   CRPI 3.26 <3.00   < >  --   --   --     < > = values in this interval not displayed.       Immune Globulin Studies     Recent Labs   Lab Test 08/05/19  1552   IGG 1,110             Metabolic Studies       Recent Labs   Lab Test 03/11/25  1617 03/11/25  1112 03/10/25  1703 03/10/25  0614 03/09/25  1720 03/09/25  1043 03/08/25  1852 03/08/25  1606 01/20/25  1800 01/20/25  1635 12/11/24  0229 12/10/24  2148   NA  --   --   --  141  --  141 139 138   < >  --    < >  --    POTASSIUM  --   --   --  3.9  --  4.0 4.1 4.4   < >  --    < >  --    CHLORIDE  --   --   --  109*  --  108* 105 103   < >  --    < >  --    CO2  --   --   --  21*  --  20* 15* 24   < >  --    < >  --    ANIONGAP  --   --   --  11  --  13 19* 11   < >  --    < >  --    BUN  --   --   --  39.0*  --  38.5* 48.1* 51.3*   < >  --    < >  --    CR  --  1.19*  --  1.43*  --  1.34* 1.49* 1.56*   < >  --    < >  --    GFRESTIMATED  --  47*  --  38*  --  41* 36* 34*   < >  --    < >  --    *  --    < > 106*   < > 136* 188* 183*   < >  --    < >  --    A1C  --   --   --   --   --   --    --   --   --   --   --  6.9*   KEILY  --   --   --  9.0  --  8.9 8.8 10.4   < >  --    < >  --    PHOS  --   --   --   --   --  2.8  --   --   --   --   --   --    MAG  --   --   --   --   --  4.0*  --   --   --   --   --   --    LACT  --   --   --   --   --   --  1.0 2.2*  --   --    < >  --    PCAL  --   --   --   --   --   --   --  0.11  --   --    < >  --    FGTL  --   --   --   --   --   --   --   --   --  37  --   --     < > = values in this interval not displayed.       Hepatic Studies    Recent Labs   Lab Test 03/10/25  0614 03/09/25  1043 03/08/25  1852 03/08/25  1606 01/22/25  0632 01/20/25  0614   BILITOTAL 0.2 0.4 0.6   < > 0.2  --    DBIL  --   --   --   --  <0.20  --    ALKPHOS 97 102 108   < > 103  --    PROTTOTAL 5.7* 6.1* 6.1*   < > 5.9*  --    ALBUMIN 3.0* 3.2* 3.3*   < > 3.1*  --    AST 37 46* 92*   < > 18  --    ALT 87* 84* 86*   < > 22  --    LDH  --   --   --   --   --  266*    < > = values in this interval not displayed.       Pancreatitis testing    Recent Labs   Lab Test 01/03/25  1733 12/10/24  1446 09/04/24  1008 09/18/20  0000 08/26/19  2331   LIPASE 29 33  --   --  66*   TRIG  --   --  329*   < >  --     < > = values in this interval not displayed.       Gout Labs      Recent Labs   Lab Test 08/05/19  1552   URIC 5.6       Hematology Studies   Recent Labs   Lab Test 03/11/25  0923 03/10/25  0614 03/09/25  1043 03/08/25  1606 02/06/25  1600 01/04/25  0742 01/03/25  1733 10/11/24  2212 09/30/24  1545 06/13/23  1820 12/05/22  0954   WBC 7.1 8.9 15.1* 18.9* 7.8   < > 9.2   < > 8.2   < >  --    48251  --   --   --   --   --   --   --   --   --   --  9.0   ANEU  --   --   --   --   --   --  6.9  --  3.7  --   --    ANEUTAUTO  --   --   --  16.4* 5.1   < >  --    < >  --    < >  --    ALYM  --   --   --   --   --   --  1.0  --  2.7   < >  --    ALYMPAUTO  --   --   --  0.9 1.8   < >  --    < >  --    < >  --    KATHY  --   --   --   --   --   --  1.0  --  1.0   < >  --    AMONOAUTO  --   --   --   1.3 0.7   < >  --    < >  --    < >  --    AEOS  --   --   --   --   --   --  0.0  --  0.2   < >  --    AEOSAUTO  --   --   --  0.0 0.0   < >  --    < >  --    < >  --    ABSBASO  --   --   --  0.0 0.0   < >  --    < >  --    < >  --    HGB 9.8* 9.4* 10.5* 10.8* 10.6*   < > 10.2*   < > 11.1*   < >  --    53010  --   --   --   --   --   --   --   --   --   --  11.9   HCT 32.7* 31.0* 34.2* 34.2* 32.7*   < > 32.2*   < > 35.0   < >  --     305 323 346 395   < > 418   < > 388   < >  --    25939  --   --   --   --   --   --   --   --   --   --  406    < > = values in this interval not displayed.       Strongyloides testing  Recent Labs   Lab Test 03/08/25  1606 02/06/25  1600 01/06/25  2306 01/03/25  1733 10/11/24  2212 09/30/24  1545   EOSINOPHIL 0 0   < > 0   < > 3   AEOS  --   --   --  0.0  --  0.2   AEOSAUTO 0.0 0.0   < >  --    < >  --     < > = values in this interval not displayed.       Clotting Studies    Recent Labs   Lab Test 01/04/25  1537 01/04/25  0742 01/03/25  1733 08/26/19  2331 05/18/18  0731   INR  --   --  1.23* 1.33* 1.06   PTT 31   < >  --  38* 33    < > = values in this interval not displayed.       Iron Testing    Recent Labs   Lab Test 03/11/25  0923 09/04/24  1008 06/15/23  1028 08/26/19  2331 08/05/19  1552 07/30/18  0913 07/30/18  0855   IRON  --   --   --   --  56  --  39   FEB  --   --   --   --  267  --  307   IRONSAT  --   --   --   --  21  --  13*   LAURA  --   --   --   --  118  --  18   MCV 90   < > 92   < > 88   < >  --    B12  --   --  456  --   --   --   --     < > = values in this interval not displayed.       Blood Gas Testing    Recent Labs   Lab Test 01/06/25  2302 01/03/25  1735   PHV 7.50* 7.47*   PCO2V 27* 28*   PO2V 37 42   HCO3V 21 20*        Thyroid Studies     Recent Labs   Lab Test 01/20/25  1635 11/14/24  1506 05/20/19  0957 10/16/18  0902 07/30/18  0855   TSH 1.53 3.31 12.11* 18.39* 8.64*   T4  --  1.92* 0.99 0.85 1.02   T3  --   --   --   --  44*       Urine  "Studies     Recent Labs   Lab Test 03/08/25  1737 02/07/25  0215 01/22/25  1727 01/21/25  1422 01/19/25  2100   URINEPH 6.0 5.5 5.5 5.5 6.0   NITRITE Positive* Negative Negative Negative Negative   LEUKEST Large* Small* Large* Large* Trace*   WBCU >182* 10-25* >182* >182* 7*   WBC CLUMPS Present*  --  Present*  --   --        CSF testing   No lab results found.    Invalid input(s): \"CADAM\", \"EVPCR\", \"ENTPCR\", \"ENTEROVIRUS\"    Medication levels    Recent Labs   Lab Test 03/11/25  1112 03/10/25  0614 01/06/25  2306 09/04/24  1008 08/29/19  0544 04/17/18  0942 10/23/17  1025   VANCOMYCIN 19.4   < >  --   --   --   --   --    VCON  --   --   --   --  2.2   < >  --    CYCLSP  --   --   --   --   --   --  Canceled, Test credited   RAPAMY  --   --  4.5*   < > 9.8   < >  --     < > = values in this interval not displayed.       Body fluid stats  No lab results found.    Microbiology:  Fungal testing  Recent Labs   Lab Test 01/20/25  1635 01/13/25  1425 01/13/25  1410   HISGAQNTUR  --  Not Detected  --    FGTL 37  --   --    FGTLI Negative  --   --    COFUNG  --   --  <1:2   ASPA  --   --  <1:8   HISTOMYCF  --   --  <1:8   HISTOYEACF  --   --  <1:8   FUNBL  --   --  0.6       Last Culture results   Rapid Strep A Screen   Date Value Ref Range Status   10/05/2005   Final    NEGATIVE: No Group A streptococcal antigen detected by immunoassay, await     Comment:      culture report.  Critical Value called to and read back by Theodora in Transplant clinic 13:20pm   10/5/05 ()     Culture   Date Value Ref Range Status   03/09/2025 No growth after 2 days  Preliminary   03/09/2025 No growth after 2 days  Preliminary   03/08/2025 No growth after 2 days  Preliminary   03/08/2025 >100,000 CFU/mL Klebsiella pneumoniae (A)  Final   03/08/2025 Positive on the 1st day of incubation (A)  Final   03/08/2025 Enterococcus faecalis (AA)  Final     Comment:     2 of 2 bottles   02/07/2025 10,000-50,000 CFU/mL Mixture of Urogenital Rashmi  Final "   01/22/2025 10,000-50,000 CFU/mL Mixture of urogenital louann  Final   01/21/2025 >100,000 CFU/mL Mixture of Urogenital Louann  Final   01/20/2025 No Growth  Final   01/20/2025 No Growth  Final   01/20/2025 No Growth  Final   01/19/2025 <10,000 CFU/mL Mixture of Urogenital Louann  Final   01/19/2025 No Growth  Final   01/19/2025 Positive on the 1st day of incubation (A)  Final   01/19/2025 Staphylococcus epidermidis (AA)  Final     Comment:     1 of 2 bottles  The recovery of this organism from a single blood culture bottle most likely represents contamination. If susceptibility testing is needed, please refer to the antibiogram or contact IDDL. If contamination is suspected, it is important to repeat two sets of blood cultures from two different sites.   01/10/2025 <10,000 CFU/mL Urogenital louann  Final   01/07/2025 No Growth  Final   01/06/2025 No Growth  Final   01/03/2025 No Growth  Final     Culture Micro   Date Value Ref Range Status   08/30/2019 No growth  Final   08/29/2019 No growth  Final   08/28/2019 No growth  Final   08/28/2019 No growth  Final   08/27/2019 10,000 to 50,000 colonies/mL  Escherichia coli   (A)  Final   08/27/2019 (A)  Final    Cultured on the 1st day of incubation:  Escherichia coli  Susceptibility testing done on previous specimen     08/27/2019   Final    Critical Value/Significant Value, preliminary result only, called to and read back by   Maria Teresa Martines RN 08/27/2019 @1425 dk/[facility]     08/26/2019 (A)  Final    Cultured on the 1st day of incubation:  Escherichia coli     08/26/2019   Final    Critical Value/Significant Value, preliminary result only, called to and read back by  NENO Robert at 1229 8.27.19.DK     08/26/2019   Final    (Note)  POSITIVE for E.COLI by Verigene multiplex nucleic acid test. Final  identification and antimicrobial susceptibility testing will be  verified by standard methods. Verigene test will not distinguish  E.coli from Shigella species including  "S.dysenteriae, S.flexneri,  S.boydii, and S.sonnei. Specimens containing Shigella species or  E.coli will be reported as Positive for E.coli.    Specimen tested with [email or web address] multiplex, gram-negative blood culture  nucleic acid test for the following targets: Acinetobacter sp.,  Citrobacter sp., Enterobacter sp., Proteus sp., E. coli, K.  pneumoniae/oxytoca, P. aeruginosa, and the following resistance  markers: CTXM, KPC, NDM, VIM, IMP and OXA.    Critical Value/Significant Value called to and read back by  Flower Lujan Rn @ 1449 8.27.19          Escherichia coli   Date Value Ref Range Status   09/22/2024 Not Detected Not Detected Final     Enterococcus faecium   Date Value Ref Range Status   03/08/2025 Not Detected Not Detected Final           Last checks of Clostridioides difficile testing  No lab results found.    Infection Studies to assess Diarrhea No lab results found.    Invalid input(s): \"BIDYD\"    Virology:  Coronavirus-19 testing    Recent Labs   Lab Test 03/08/25  1549 01/19/25  1908 01/06/25  2309 01/03/25  1746   [id number] Negative Negative Negative Negative       Respiratory virus (non-coronavirus-19) testing    Recent Labs   Lab Test 03/08/25  1549   IFLUA Not Detected   INFZA Negative   FLUAH1 Not Detected   OF9744 Not Detected   FLUAH3 Not Detected   IFLUB Not Detected   INFZB Negative   PIV1 Not Detected   PIV2 Not Detected   PIV3 Not Detected   PIV4 Not Detected   IRSV Negative   RSVA Not Detected   RSVB Not Detected   HMPV Not Detected   RHINEV Not Detected   ADENOV Not Detected   MOHR Not Detected       Viral loads    Recent Labs   Lab Test 01/20/25  1635 08/28/19  0528 07/30/18  0856   EBQI Not Detected  --   --    EBRES  --  2,386*  --    CMVQNT Not Detected  --  CMV DNA Not Detected   CMVLOG  --   --  Not Calculated       CMV resistance testing  No lab results found.    No results found for: \"H6RES\"    BK Virus Testing   No lab results found.    Parvovirus Testing    Recent Labs   Lab " "Test 25  1635   PRVG 1.82*   PRVM 0.16   PRVPC Not Detected       Adenovirus Testing  No lab results found.    Invalid input(s): \"ADENAB\", \"ADENOVIRUS\", \"ADQT\"    Imaging:  Recent Results (from the past 48 hours)   Echocardiogram Complete   Result Value    LVEF  60-65%    Narrative    [id number]  [id number]  [id number]  [id number]^MARY JO^AVERY^MATILDE     [facility],[place]  Echocardiography Laboratory  [address] [place] [address]     Name: ISA CAMARGO  MRN: [id number]  : 1950  Study Date: 03/10/2025 09:11 AM  Age: 74 yrs  Gender: Female  Patient Location: [facility]  Reason For Study: Endocarditis  Ordering Physician: AVERY CAMARGO  Performed By: Renetta Melara     BSA: 1.9 m2  Height: 67 in  Weight: 170 lb  HR: 70  BP: 147/79 mmHg  ______________________________________________________________________________  Procedure  Echocardiogram with two-dimensional, color and spectral Doppler. Poor acoustic  windows.  ______________________________________________________________________________  Interpretation Summary  No vegetation or mass identified. Global and regional left ventricular  function is normal with an EF of 60-65%.  Right ventricular function, chamber size, wall motion, and thickness are  normal.  The inferior vena cava is normal.  No pericardial effusion is present.  ______________________________________________________________________________  Left Ventricle  Global and regional left ventricular function is normal with an EF of 60-65%.  Left ventricular wall thickness is normal. Left ventricular size is normal.  Diastolic function not assessed due to significant mitral annular  calcification. No regional wall motion abnormalities are seen.     Right Ventricle  Right ventricular function, chamber size, wall motion, and thickness are  normal.     Atria  Both atria appear normal.     Mitral Valve  Moderate mitral annular calcification is present. Trace to " mild mitral  insufficiency is present.     Aortic Valve  Aortic valve sclerosis is present. Trace to mild aortic insufficiency is  present.     Tricuspid Valve  The tricuspid valve is normal. Mild tricuspid insufficiency is present. The  right ventricular systolic pressure is approximated at 11.8 mmHg plus the  right atrial pressure. Pulmonary artery systolic pressure is normal.     Pulmonic Valve  The pulmonic valve is normal.     Vessels  The thoracic aorta cannot be assessed. The pulmonary artery cannot be  assessed. The inferior vena cava is normal.     Pericardium  No pericardial effusion is present.     ______________________________________________________________________________  MMode/2D Measurements & Calculations  LVOT diam: 1.9 cm  LVOT area: 2.7 cm2     Doppler Measurements & Calculations  Ao V2 max: 153.4 cm/sec  Ao max P.4 mmHg  Ao V2 mean: 101.0 cm/sec  Ao mean P.6 mmHg  Ao V2 VTI: 34.9 cm  MAYUR(I,D): 1.6 cm2  MAYUR(V,D): 1.7 cm2  LV V1 max PG: 3.8 mmHg  LV V1 max: 97.8 cm/sec  LV V1 VTI: 20.7 cm  SV(LVOT): 55.7 ml  SI(LVOT): 29.5 ml/m2  TR max nate: 171.6 cm/sec  TR max P.8 mmHg  AV Nate Ratio (DI): 0.64  MAYUR Index (cm2/m2): 0.85     ______________________________________________________________________________  Report approved by: JAY Fishman MD on 03/10/2025 10:14 AM         US Liver Transplant Follow Up    Narrative    EXAMINATION: US LIVER TRANSPLANT FOLLOW UP, 3/10/2025 2:25 PM     COMPARISON: 2019, 2016    HISTORY: Follow up on liver transplant in the setting of infection and  c/f obstruction/infection source and assess flow.     TECHNIQUE:  Gray-scale, color Doppler and spectral flow analysis.    FINDINGS:   There is no ascites.    Liver:   The liver demonstrates normal homogeneous echotexture. No  evidence of a focal hepatic mass.     Bile Ducts: Intrahepatic biliary system is of normal caliber.  The  common bile duct is not visualized.     Gallbladder: The gallbladder is  surgically absent.    Right kidney:  The right kidney demonstrates echogenic renal  parenchyma with no evidence of a shadowing stone, focal mass or  hydronephrosis.   11.0 cm in long axis dimension.    Pancreas: The pancreas is not visualized.    Visualized portions of the aorta are obscured.    LIVER DOPPLER:  Splenic vein:  Patent continuous normal antegrade direction flow  towards the liver, 21 cm/sec.  Extrahepatic portal vein:  Patent continuous antegrade flow, 21  cm/sec.  Portal vein at anastomosis: Patent continuous antegrade flow, 25  cm/sec.  Intrahepatic portal vein:  Patent continuous antegrade flow, 26  cm/sec.  Right portal vein flow is antegrade, measuring 21 cm/sec.  Left portal vein is not visualized.     Inferior vena cava: patent with flow toward the heart throughout..  IVC above anastomosis:  98 cm/sec.  IVC at anastomosis:  45 cm/sec.  Intrahepatic IVC:  39 cm/sec.  Extrahepatic IVC:  40 cm/sec.    Right, mid, left hepatic veins: Patent with flow towards the inferior  vena cava.    Extrahepatic hepatic artery: Low resistance waveform with flow towards  the liver. 56/13 cm/sec with resistive index 0.77.  Right hepatic artery: 57/12 cm/sec with resistive index 0.79.  Left hepatic artery is not visualized.      Impression    Impression:   1.  Patent Doppler ultrasound of the transplant liver. The left portal  vein and left hepatic artery were not visualized.  2.  Unremarkable grayscale evaluation of the transplant liver. The  common bile duct was not visualized.  3.  Echogenic right renal parenchyma suggestive of medical renal  disease.    I have personally reviewed the examination and initial interpretation  and I agree with the findings.    KAYLIE BLAS MD         SYSTEM ID:  [id number]

## 2025-03-11 NOTE — PHARMACY-VANCOMYCIN DOSING SERVICE
Pharmacy Vancomycin Note  Date of Service 2025  Patient's  1950   74 year old, female, ABW 77.1 kg    Indication: FUO, E.faecalis bacteremia   Day of Therapy: 4  Current vancomycin regimen:  750 mg IV q24h  Current vancomycin monitoring method: AUC  Current vancomycin therapeutic monitoring goal: 400-600 mg*h/L    InsightRX Prediction of Current Vancomycin Regimen      Current estimated CrCl = Estimated Creatinine Clearance: 44.2 mL/min (A) (based on SCr of 1.19 mg/dL (H)).    Creatinine for last 3 days  3/8/2025:  4:06 PM Creatinine 1.56 mg/dL;  6:52 PM Creatinine 1.49 mg/dL  3/9/2025: 10:43 AM Creatinine 1.34 mg/dL  3/10/2025:  6:14 AM Creatinine 1.43 mg/dL  3/11/2025: 11:12 AM Creatinine 1.19 mg/dL    Recent Vancomycin Levels (past 3 days)  3/10/2025:  6:14 AM Vancomycin 19.7 ug/mL  3/11/2025: 11:12 AM Vancomycin 19.4 ug/mL - 14 hrs post-dose    Vancomycin IV Administrations (past 72 hours)                     vancomycin (VANCOCIN) 750 mg in sodium chloride 0.9 % 282.5 mL intermittent infusion (mg) 750 mg New Bag 03/10/25 2116    vancomycin (VANCOCIN) 1,000 mg in 200 mL dextrose intermittent infusion (mg) 1,000 mg New Bag 25 1838    vancomycin (VANCOCIN) 1,750 mg in sodium chloride 0.9 % 567.5 mL intermittent infusion (mg) 1,750 mg Given 25 1931                    Nephrotoxins and other renal medications (From now, onward)      Start     Dose/Rate Route Frequency Ordered Stop    03/10/25 2000  vancomycin (VANCOCIN) 750 mg in sodium chloride 0.9 % 282.5 mL intermittent infusion         750 mg  over 90 Minutes Intravenous EVERY 24 HOURS 03/10/25 0755                 Contrast Orders - past 72 hours (72h ago, onward)      Start     Dose/Rate Route Frequency Stop    25 1750  iopamidol (ISOVUE-370) solution 104 mL         104 mL Intravenous ONCE 25 0715            Interpretation of levels and current regimen:  Vancomycin level is reflective of -600    Has serum creatinine  changed greater than 50% in last 72 hours: No but modest improvement since vancomycin intiation 1.5 > 1.2    Urine output:  good urine output, >1.3 L charted so far totday 3/11    Renal Function: Improving      Plan:  Continue current regimen  vancomcyin 750 mg IV q24h  Vancomycin monitoring method: AUC  Vancomycin therapeutic monitoring goal: 400-600 mg*h/L  Pharmacy will check vancomycin levels as appropriate in 1-3 Days.  Serum creatinine levels will be ordered daily for the first week of therapy and at least twice weekly for subsequent weeks.    Daquan Torres, PharmD, BCPS

## 2025-03-11 NOTE — PROGRESS NOTES
[facility]    Medicine Progress Note - Hospitalist Service, GOLD TEAM 1    Date of Admission:  3/8/2025    Updates today:  -As per GI recs decrease prednisone to 5 mg daily  -Appreciate ID following, continue to monitor cultures  -Appreciate GI input, updates as per below and GI appropriately signing off  -Resume PTA hydralazine with hold parameters  -Tolerating resumption of metoprolol  -Patient appropriate to return home when medically stable for discharge    Assessment & Plan   Luz Thompson is a 74F w/ PMH of primary biliary cirrhosis s/p liver transplant (2002), paroxysmal atrial fibrillation on Eliquis, CVA, CKD, HTN, DM2, PAD, hypothyroidism who was admitted with sepsis.      Sepsis-resolved  Klebsiella pneumoniae UTI  Gram positive cocci bacteremia  Hx of recurrent UTIs  Follows with Urology and ID for recurrent UTIs. Presented with fever of 100.6 DegF, leukocytosis. Urinalysis c/w infection with urine culture now growing >100k colonies Klebsiella pneumoniae, does have a hx of ESBL Klebsiella. 2 of 2 blood cultures growing gram positive cocci. Started meropenem and received 1x dose of vancomycin in ED.   - Transplant ID consult input appreciated    - Abx:  - ertapenem (3/9-)    - Started IV vancomycin (3/9-)   - discontinue meropenem (3/8-3/9)   - Repeat blood cultures x2 (3/9)- NGTD   - Follow pending blood and urine cultures   - PT, OT consult input appreciated   - Vitals q4h--> changed to q8h (discontinue nocturnal checks unless concerns)     Transaminitis  Primary biliary cirrhosis s/p liver transplant (2002)  Labs on admission notable for ALT 61, AST 58 with normal alk phos and Tbili. CT a/p with subtle hypodensity in inferior liver c/f peripheral biliary ductal dilatation or edema.   - Hepatology consult input appreciated, siged off, per their recs:   - Continue immunosuppression at current dose except for prednisone- decrease to 5 mg qdaily,  indefinitely.  - Treatment of bacteremia and UTI, per ID.  - No signs of acute cholangitis. But recommend outpatient MRCP to evaluate bile ducts more closely. Pancreatic cyst can be assessed at that time as well, otherwise that will require MRI follow up in 1 year.  - liver US w/ doppler no acute pathology    - IS:               - Prednisone above               - Sirolimus 1 mg daily   - Ursodiol 250 mg BID   - Trend hepatic panel     HTN  HLD  Paroxsymal atrial fibrillation  Blood pressures soft secondary to the above. Bradycardic overnight with HR in 40s, asymptomatic on admission. Remains AVSS. No e/o significant bleeding.    - resumed PTA hydralazine w/ hold parameters  - resume metoprolol w/ parameters   - Continue PTA Eliquis 5 mg BID   - Continue PTA ezetimibe (allergies to statins)   - Telemetry monitoring     DM2  Diabetic neuropathy  A1c 6.9 in December.   Recent Labs   Lab 03/10/25  2319 03/10/25  1703 03/10/25  0614 03/09/25  1720 03/09/25  1043 03/08/25  1852   * 227* 106* 217* 136* 188*     - glucose checks BID and HS   - Continue PTA linagliptin   - Continue PTA gabapentin   - BG checks BID. No need for sliding scale insulin currently, initiate if BG persistently >180     CKD IIIb: Creatinine stable within baseline range of ~1.4 - 1.6. Cr improved to 1.1 (3/11) in the setting of above - Next scheduled with Nephrology on 4/2/25.      Hypothyroidism: TSH normal (1/20/2025) continue PTA levothyroxine.      SCC of left cheek: Scheduled for MOHS excision with Dr. May on 4/1/25.     Cardiac murmur-documented per patient's chart and TTE as per above without concerning pathology, murmur not appreciated on assessment today    Osteoarthritis  Physical deconditioning-appreciate PT and OT input, patient is appropriate for return to home on discharge            Diet: High Consistent Carb (75 g CHO per Meal) Diet  Snacks/Supplements Adult: Danna Quiles Standard Oral Supplement; With Meals    DVT  "Prophylaxis: DOAC  Ron Catheter: Not present  Lines: None     Cardiac Monitoring: ACTIVE order. Indication: Bradycardias (48 hours)  Code Status: Full Code      Clinically Significant Risk Factors          # Hyperchloremia: Highest Cl = 109 mmol/L in last 2 days, will monitor as appropriate          # Hypoalbuminemia: Lowest albumin = 3 g/dL at 3/10/2025  6:14 AM, will monitor as appropriate     # Hypertension: Noted on problem list           # DMII: A1C = N/A within past 6 months, PRESENT ON ADMISSION  # Overweight: Estimated body mass index is 26.23 kg/m  as calculated from the following:    Height as of 2/14/25: 1.715 m (5' 7.5\").    Weight as of 2/12/25: 77.1 kg (170 lb)., PRESENT ON ADMISSION     # Financial/Environmental Concerns: none         Social Drivers of Health    Housing Stability: Low Risk  (1/27/2025)    Housing Stability     Do you have housing? : Yes     Are you worried about losing your housing?: No   Recent Concern: Housing Stability - High Risk (1/22/2025)    Housing Stability     Do you have housing? : No     Are you worried about losing your housing?: No   Tobacco Use: Medium Risk (2/12/2025)    Patient History     Smoking Tobacco Use: Former     Smokeless Tobacco Use: Never   Transportation Needs: High Risk (1/27/2025)    Transportation Needs     Within the past 12 months, has lack of transportation kept you from medical appointments, getting your medicines, non-medical meetings or appointments, work, or from getting things that you need?: Yes   Physical Activity: Insufficiently Active (11/9/2023)    Received from [facility]    Exercise Vital Sign     Days of Exercise per Week: 5 days     Minutes of Exercise per Session: 10 min          Disposition Plan     Medically Ready for Discharge: 1-2 days, pending culture data            The patient's care was discussed with the Attending Physician, Dr. Aquino, Bedside Nurse, and Patient.    Karin Cast PA-C  Hospitalist Service, GOLD TEAM 1  M " [facility]  Securely message with [employer] (more info)  Text page via [employer] Paging/Directory   See signed in provider for up to date coverage information  ______________________________________________________________________    Interval History   [place]ia is seen in the ER.  She notes that her systolic blood pressures tend to be in the 120s at home and have been somewhat elevated here and she is interested in resuming her hydralazine.  She notes that nocturnal vital signs disrupted her sleep and is wondering if she still needs vital signs checked at night.    She continues to be without overt signs of systemic infection including any nausea, vomiting, fevers, chills and does not endorse having any new pains.  She reports that she did well walking with physical therapy.  She continues to void without issues.  She is tolerating adequate p.o. food and fluids.  She was able to have a nonbloody bowel movement here in the hospital.    She notes that she has had a murmur described to her for which she does not endorse symptoms    Physical Exam   Vital Signs: Temp: 98.2  F (36.8  C) Temp src: Oral BP: (!) 152/67 Pulse: 64   Resp: 12 SpO2: 99 % O2 Device: Nasal cannula Oxygen Delivery: 1 LPM  Weight: 0 lbs 0 oz  GENERAL: Alert and oriented x 3. NAD.  Able to sit upright grasping bed rails. Cooperative.   HEENT: Anicteric sclera.  Pupils equal round NC. AT.   CV: RRR. S1, S2. No murmurs appreciated.   RESPIRATORY: Effort normal on RA. Lungs CTAB with no wheezing, rales, rhonchi.   GI: Abdomen soft, NT, NABS.   NEUROLOGICAL: No focal deficits. Moves all extremities.    EXTREMITIES: No peripheral edema. Intact bilateral pedal pulses.   SKIN: No jaundice. No rashes on exposed skin      Medical Decision Making       55 MINUTES SPENT BY ME on the date of service doing chart review, history, exam, documentation & further activities per the note.      Data     I have personally reviewed the  following data over the past 24 hrs:    7.1  \   9.8 (L)   / 305     N/A N/A N/A /  148 (H)   N/A N/A 1.19 (H) \       Imaging results reviewed over the past 24 hrs:   Recent Results (from the past 24 hours)   US Liver Transplant Follow Up    Narrative    EXAMINATION: US LIVER TRANSPLANT FOLLOW UP, 3/10/2025 2:25 PM     COMPARISON: 8/27/2019, 5/13/2016    HISTORY: Follow up on liver transplant in the setting of infection and  c/f obstruction/infection source and assess flow.     TECHNIQUE:  Gray-scale, color Doppler and spectral flow analysis.    FINDINGS:   There is no ascites.    Liver:   The liver demonstrates normal homogeneous echotexture. No  evidence of a focal hepatic mass.     Bile Ducts: Intrahepatic biliary system is of normal caliber.  The  common bile duct is not visualized.     Gallbladder: The gallbladder is surgically absent.    Right kidney:  The right kidney demonstrates echogenic renal  parenchyma with no evidence of a shadowing stone, focal mass or  hydronephrosis.   11.0 cm in long axis dimension.    Pancreas: The pancreas is not visualized.    Visualized portions of the aorta are obscured.    LIVER DOPPLER:  Splenic vein:  Patent continuous normal antegrade direction flow  towards the liver, 21 cm/sec.  Extrahepatic portal vein:  Patent continuous antegrade flow, 21  cm/sec.  Portal vein at anastomosis: Patent continuous antegrade flow, 25  cm/sec.  Intrahepatic portal vein:  Patent continuous antegrade flow, 26  cm/sec.  Right portal vein flow is antegrade, measuring 21 cm/sec.  Left portal vein is not visualized.     Inferior vena cava: patent with flow toward the heart throughout..  IVC above anastomosis:  98 cm/sec.  IVC at anastomosis:  45 cm/sec.  Intrahepatic IVC:  39 cm/sec.  Extrahepatic IVC:  40 cm/sec.    Right, mid, left hepatic veins: Patent with flow towards the inferior  vena cava.    Extrahepatic hepatic artery: Low resistance waveform with flow towards  the liver. 56/13 cm/sec  with resistive index 0.77.  Right hepatic artery: 57/12 cm/sec with resistive index 0.79.  Left hepatic artery is not visualized.      Impression    Impression:   1.  Patent Doppler ultrasound of the transplant liver. The left portal  vein and left hepatic artery were not visualized.  2.  Unremarkable grayscale evaluation of the transplant liver. The  common bile duct was not visualized.  3.  Echogenic right renal parenchyma suggestive of medical renal  disease.    I have personally reviewed the examination and initial interpretation  and I agree with the findings.    KAYLIE BLAS MD         SYSTEM ID:  [id number]

## 2025-03-11 NOTE — PLAN OF CARE
"Nursing Plan of Care Note  Shift: [id number]    General: A&Ox4. Able to follow commands and make needs known. Afebrile.   Pain: Reported back pain that \"felt like [her] kidney\", resolved. Knee pain exacerbated working with PT, tylenol given x1.  Cardiac/Resp: VSS on room air, on 1-2L overnight. Hypertensive 140-150s systolic - Hydralazine unheld.   /GI: Abdomen soft, nontender. BM today. Tolerating consistent carb diet. Voids spontaneously.   Access/infusions: PIV - SL   Skin: L cheek skin cancer spot  Activity: SBA. Up to commode.   Plan: Continue with plan of care. Notify primary team with changes.     Goal Outcome Evaluation:   Plan of Care Reviewed With: patient  Overall Patient Progress: improvingOverall Patient Progress: improving  Outcome Evaluation: continue iv abx      "

## 2025-03-11 NOTE — PROGRESS NOTES
GASTROENTEROLOGY PROGRESS NOTE    Date of Admission: 3/8/2025  Reason for Admission: immunosuppression management    ASSESSMENT:  Luz Thompson is a 74 year old female admitted on 3/8/2025. She medical history is significant for primary biliary cirrhosis s/p liver transplant (2002) (on sirolimus, prednisone 10 mg daily and ursodiol), paroxysmal atrial fibrillation on Eliquis, history of CVA, CKD IIIb, diabetes mellitus type 2 not on insulin, hypothyroidism, hypertension, and PAD who presented to the hospital for fever of 102  F along with chills.     # History of PBC s/p Ltxp 2002  # Current Klebsiella UTI, history of recurrent UTI  # E. Faecalis bacteremia   # CKD 3B, diabetes mellitus  # Urinary tract infection infection  # Elevated transaminases     Patient presented with fever and chills along with UA consistent with UTI.  She has systemic symptoms as manifested by fever and leukocytosis.     CTAP prelim read: Postsurgical changes of liver transplant, subtle curvilinear  hypodensity in the inferior liver may represent peripheral biliary ductal dilatation or edema.     Similar likely pancreatic cyst measuring 1.4 cm, stable. No pancreatic ductal dilatation.      Initial LFTs: AST 61, ALT 58, TB 0.5  Bcx: E. Faecalis   Ucx: Klebsiella    RECOMMENDATIONS:  - Continue immunosuppression at current dose except for prednisone- decrease to 5 mg qdaily, indefinitely.  - Treatment of bacteremia and UTI, per ID.  - No signs of acute cholangitis. But recommend outpatient MRCP to evaluate bile ducts more closely. Pancreatic cyst can be assessed at that time as well, otherwise that will require MRI follow up in 1 year.  - Continue to monitor LFTs.    GI to sign off. Patient already has outpatient follow up scheduled with hepatology in April (Dr. Ramirez).    Thank you for involving us in this patient's care. Please do not hesitate to contact the GI service with any questions or concerns.     The patient was discussed  and plan agreed upon with GI staff, Dr. Bradley.    Gayatri Antunez MD MPH  GI Fellow  Division of Gastroenterology, Hepatology, and Nutrition  [facility]      _______________________________________________________________      Subjective:   - Nursing notes and 24hr events reviewed.   - NAEO.   - Patient states she is still feeling well this AM.   - She denies any fevers, chills, N/V.    ROS:   4 pt ROS negative unless noted in subjective.     Objective:  Blood pressure (!) 154/67, pulse 69, temperature 98.1  F (36.7  C), temperature source Axillary, resp. rate 19, last menstrual period 06/01/1988, SpO2 98%, not currently breastfeeding.    Gen: no acute distress  HEENT: NCAT. Conjunctiva clear. Sclera anicteric   Resp: Nonlabored work of breathing  Abd: Soft, NT, ND, no guarding or rebound   Msk: no gross deformity  Skin:  No jaundice  Ext: warm, well perfused   Neuro: grossly normal  Mental status/Psych: A&O. Asks/answers questions appropriately           LABS:  BMP  Recent Labs   Lab 03/11/25  1112 03/10/25  2319 03/10/25  1703 03/10/25  0614 03/09/25  1720 03/09/25  1043 03/08/25  1852 03/08/25  1606   NA  --   --   --  141  --  141 139 138   POTASSIUM  --   --   --  3.9  --  4.0 4.1 4.4   CHLORIDE  --   --   --  109*  --  108* 105 103   KEILY  --   --   --  9.0  --  8.9 8.8 10.4   CO2  --   --   --  21*  --  20* 15* 24   BUN  --   --   --  39.0*  --  38.5* 48.1* 51.3*   CR 1.19*  --   --  1.43*  --  1.34* 1.49* 1.56*   GLC  --  148* 227* 106* 217* 136* 188* 183*     CBC  Recent Labs   Lab 03/11/25  0923 03/10/25  0614 03/09/25  1043 03/08/25  1606   WBC 7.1 8.9 15.1* 18.9*   RBC 3.65* 3.53* 3.86 4.00   HGB 9.8* 9.4* 10.5* 10.8*   HCT 32.7* 31.0* 34.2* 34.2*   MCV 90 88 89 86   MCH 26.8 26.6 27.2 27.0   MCHC 30.0* 30.3* 30.7* 31.6   RDW 16.9* 17.2* 17.2* 17.1*    305 323 346     INRNo lab results found in last 7 days.  LFTs  Recent Labs   Lab 03/10/25  0614 03/09/25  1043 03/08/25  2646  03/08/25  1606   ALKPHOS 97 102 108 110   AST 37 46* 92* 58*   ALT 87* 84* 86* 61*   BILITOTAL 0.2 0.4 0.6 0.5   PROTTOTAL 5.7* 6.1* 6.1* 6.5   ALBUMIN 3.0* 3.2* 3.3* 3.5      PANCNo lab results found in last 7 days.      PROCEDURES: none    IMAGING:    US Liver 3/10/25  Impression:   1.  Patent Doppler ultrasound of the transplant liver. The left portal  vein and left hepatic artery were not visualized.  2.  Unremarkable grayscale evaluation of the transplant liver. The  common bile duct was not visualized.  3.  Echogenic right renal parenchyma suggestive of medical renal  disease.       CT A/P 3/8/25  IMPRESSION:       1. Postsurgical changes of liver transplant, subtle curvilinear  hypodensity in the inferior liver may represent peripheral biliary  ductal dilatation or edema, similar to slightly more conspicuous  compared to prior CT scan; if persistent clinical concern for  cholangitis, consider gastroenterology consult and/or MRCP.  2. Fluid density 1.4 cm lesion in the pancreatic head region, may  represent pancreatic cyst/IPMN, consider follow-up as suggested  below..  3. Diverticulosis without definite diverticulitis  4. Additional findings similar to prior CT from 1/22/2025, including  right lung base calcifications and  densities.     International evidence-based [place] guidelines for the management of  intraductal papillary mucinous neoplasm of the pancreas:   Surveillance is recommended if no high risk stigmata or worrisome  features are present:  Primary imaging modalities recommended are MRI/MRCP and MDCT  Size of largest cyst:   *  Less than 20 mm: Follow-up imaging in 6 months once, then every 18  months if no change. Stop surveillance if stable for 5 years.  *  More than 20 mm but less than 30 mm: Follow-up imaging at 6 months,  12 months, then yearly if no change.   *  More than 30 mm- follow up imaging every 6 months.     GI consultation for surgery/endoscopic ultrasound is recommended for  cysts  "with \"high-risk stigmata\" of high grade dysplasia or invasive  carcinoma such as:  1. Obstructive jaundice in a patient with cystic lesion of the head of  the pancreas  2. Enhancing mural nodule > 5mm or solid component  3. Main pancreatic duct >10mm  4. Suspicious or positive results of cytology     If worrisome features are present, GI consultation is recommended.  Worrisome features on imagin. Cyst more than or equal to 30 mm  2. Thickened or enhancing cyst walls  3. Main pancreatic duct > 5mm and < 10mm.  4. Abrupt change in caliber of pancreatic duct with distal atrophy  5. Lymphadenopathy  6. Cyst growth rate > 2.5mm/year     *Reference: International evidence-based [place] guidelines for the  management of  intraductal papillary mucinous neoplasm of the pancreas Pancreatology:  24:(); 255-270.  https://doi.org/10.1016/j.mccracken.2023.12.009         "

## 2025-03-11 NOTE — PLAN OF CARE
Goal Outcome Evaluation:      Plan of Care Reviewed With: patient    Overall Patient Progress: no changeOverall Patient Progress: no change    Outcome Evaluation: Treatment of bacteremia and UTI    Reason for Admission: Fever, chills, UA consistent with UTI    RN assumed cares at [id number]    Vitals: VSS; X; HTN but within parameters  Pain: denies  Neuro: A/Ox4  Cardiac: WDL  Respiratory: WDL, X; NC 1L   GI/: Voiding in primofit, no bm this shift, commode at bedside   IV/Drains: 1x PIV   Skin: Left cheek skin ca. spot   Activity: SBA x1, cane in room   Labs: Monitoring LFTs  Diet: Consistent carb     Education Complete  Continue with POC

## 2025-03-12 ENCOUNTER — APPOINTMENT (OUTPATIENT)
Dept: PHYSICAL THERAPY | Facility: CLINIC | Age: 75
DRG: 871 | End: 2025-03-12
Payer: MEDICARE

## 2025-03-12 ENCOUNTER — ENROLLMENT (OUTPATIENT)
Dept: HOME HEALTH SERVICES | Facility: HOME HEALTH | Age: 75
End: 2025-03-12
Payer: COMMERCIAL

## 2025-03-12 LAB
CRP SERPL-MCNC: 18.1 MG/L
GLUCOSE BLDC GLUCOMTR-MCNC: 164 MG/DL (ref 70–99)
GLUCOSE BLDC GLUCOMTR-MCNC: 94 MG/DL (ref 70–99)
SCANNED LAB RESULT: NORMAL

## 2025-03-12 PROCEDURE — 250N000011 HC RX IP 250 OP 636: Performed by: PHYSICIAN ASSISTANT

## 2025-03-12 PROCEDURE — 82962 GLUCOSE BLOOD TEST: CPT

## 2025-03-12 PROCEDURE — 250N000013 HC RX MED GY IP 250 OP 250 PS 637: Performed by: PHYSICIAN ASSISTANT

## 2025-03-12 PROCEDURE — 250N000013 HC RX MED GY IP 250 OP 250 PS 637: Performed by: STUDENT IN AN ORGANIZED HEALTH CARE EDUCATION/TRAINING PROGRAM

## 2025-03-12 PROCEDURE — 250N000013 HC RX MED GY IP 250 OP 250 PS 637: Performed by: PEDIATRICS

## 2025-03-12 PROCEDURE — 99232 SBSQ HOSP IP/OBS MODERATE 35: CPT | Mod: FS | Performed by: PEDIATRICS

## 2025-03-12 PROCEDURE — 97116 GAIT TRAINING THERAPY: CPT | Mod: GP

## 2025-03-12 PROCEDURE — 120N000002 HC R&B MED SURG/OB UMMC

## 2025-03-12 PROCEDURE — 99233 SBSQ HOSP IP/OBS HIGH 50: CPT | Mod: GC | Performed by: INTERNAL MEDICINE

## 2025-03-12 PROCEDURE — 250N000012 HC RX MED GY IP 250 OP 636 PS 637: Performed by: STUDENT IN AN ORGANIZED HEALTH CARE EDUCATION/TRAINING PROGRAM

## 2025-03-12 PROCEDURE — 97530 THERAPEUTIC ACTIVITIES: CPT | Mod: GP

## 2025-03-12 PROCEDURE — 272N000450 HC KIT 4FR POWER PICC SINGLE LUMEN

## 2025-03-12 PROCEDURE — 250N000009 HC RX 250: Performed by: PHYSICIAN ASSISTANT

## 2025-03-12 PROCEDURE — 36569 INSJ PICC 5 YR+ W/O IMAGING: CPT

## 2025-03-12 PROCEDURE — 250N000012 HC RX MED GY IP 250 OP 636 PS 637: Performed by: PHYSICIAN ASSISTANT

## 2025-03-12 RX ORDER — LINEZOLID 600 MG/1
600 TABLET, FILM COATED ORAL EVERY 12 HOURS
Qty: 22 TABLET | Refills: 0 | Status: SHIPPED | OUTPATIENT
Start: 2025-03-12 | End: 2025-03-14

## 2025-03-12 RX ORDER — DIPHENHYDRAMINE HCL 12.5MG/5ML
50 LIQUID (ML) ORAL EVERY 6 HOURS PRN
COMMUNITY
Start: 2025-03-12 | End: 2025-03-14

## 2025-03-12 RX ORDER — DIPHENHYDRAMINE HCL 12.5MG/5ML
50 LIQUID (ML) ORAL EVERY 6 HOURS PRN
Status: DISCONTINUED | OUTPATIENT
Start: 2025-03-12 | End: 2025-03-14 | Stop reason: HOSPADM

## 2025-03-12 RX ORDER — LINEZOLID 600 MG/1
600 TABLET, FILM COATED ORAL EVERY 12 HOURS SCHEDULED
Status: DISCONTINUED | OUTPATIENT
Start: 2025-03-12 | End: 2025-03-12

## 2025-03-12 RX ORDER — LIDOCAINE 40 MG/G
CREAM TOPICAL
Status: DISCONTINUED | OUTPATIENT
Start: 2025-03-12 | End: 2025-03-14 | Stop reason: HOSPADM

## 2025-03-12 RX ORDER — AMOXICILLIN 250 MG
1 CAPSULE ORAL 2 TIMES DAILY PRN
Qty: 15 TABLET | Refills: 0 | Status: SHIPPED | OUTPATIENT
Start: 2025-03-12

## 2025-03-12 RX ORDER — ACETAMINOPHEN 325 MG/1
650 TABLET ORAL EVERY 4 HOURS PRN
DISCHARGE
Start: 2025-03-12

## 2025-03-12 RX ORDER — PREDNISONE 5 MG/1
5 TABLET ORAL DAILY
Qty: 30 TABLET | Refills: 0 | Status: SHIPPED | OUTPATIENT
Start: 2025-03-13

## 2025-03-12 RX ORDER — ERTAPENEM 1 G/1
1 INJECTION, POWDER, LYOPHILIZED, FOR SOLUTION INTRAMUSCULAR; INTRAVENOUS EVERY 24 HOURS
DISCHARGE
Start: 2025-03-13 | End: 2025-03-14

## 2025-03-12 RX ORDER — LINEZOLID 600 MG/1
600 TABLET, FILM COATED ORAL EVERY 12 HOURS SCHEDULED
Status: DISCONTINUED | OUTPATIENT
Start: 2025-03-12 | End: 2025-03-13

## 2025-03-12 RX ADMIN — URSODIOL 250 MG: 250 TABLET ORAL at 21:41

## 2025-03-12 RX ADMIN — GABAPENTIN 300 MG: 250 SOLUTION ORAL at 21:42

## 2025-03-12 RX ADMIN — LEVOTHYROXINE SODIUM 175 MCG: 0.17 TABLET ORAL at 07:54

## 2025-03-12 RX ADMIN — HYDRALAZINE HYDROCHLORIDE 25 MG: 25 TABLET ORAL at 14:59

## 2025-03-12 RX ADMIN — HYDRALAZINE HYDROCHLORIDE 25 MG: 25 TABLET ORAL at 07:55

## 2025-03-12 RX ADMIN — EZETIMIBE 10 MG: 10 TABLET ORAL at 07:55

## 2025-03-12 RX ADMIN — OMEGA-3-ACID ETHYL ESTERS 1 G: 1 CAPSULE, LIQUID FILLED ORAL at 07:55

## 2025-03-12 RX ADMIN — PREDNISONE 5 MG: 5 TABLET ORAL at 07:53

## 2025-03-12 RX ADMIN — OMEGA-3-ACID ETHYL ESTERS 1 G: 1 CAPSULE, LIQUID FILLED ORAL at 21:40

## 2025-03-12 RX ADMIN — APIXABAN 5 MG: 5 TABLET, FILM COATED ORAL at 21:42

## 2025-03-12 RX ADMIN — FOLIC ACID 1000 MCG: 1 TABLET ORAL at 07:53

## 2025-03-12 RX ADMIN — SIROLIMUS 1 MG: 1 TABLET, FILM COATED ORAL at 07:54

## 2025-03-12 RX ADMIN — LINAGLIPTIN 5 MG: 5 TABLET, FILM COATED ORAL at 07:54

## 2025-03-12 RX ADMIN — CALCITRIOL CAPSULES 0.25 MCG 0.25 MCG: 0.25 CAPSULE ORAL at 07:56

## 2025-03-12 RX ADMIN — URSODIOL 250 MG: 250 TABLET ORAL at 07:55

## 2025-03-12 RX ADMIN — ERTAPENEM SODIUM 1 G: 1 INJECTION, POWDER, LYOPHILIZED, FOR SOLUTION INTRAMUSCULAR; INTRAVENOUS at 07:52

## 2025-03-12 RX ADMIN — LIDOCAINE HYDROCHLORIDE 5 ML: 10 INJECTION, SOLUTION EPIDURAL; INFILTRATION; INTRACAUDAL; PERINEURAL at 13:14

## 2025-03-12 RX ADMIN — APIXABAN 5 MG: 5 TABLET, FILM COATED ORAL at 07:53

## 2025-03-12 RX ADMIN — METOPROLOL SUCCINATE 25 MG: 25 TABLET, EXTENDED RELEASE ORAL at 07:53

## 2025-03-12 RX ADMIN — LINEZOLID 600 MG: 600 TABLET, FILM COATED ORAL at 14:01

## 2025-03-12 RX ADMIN — DIPHENHYDRAMINE HYDROCHLORIDE 50 MG: 25 SOLUTION ORAL at 14:01

## 2025-03-12 RX ADMIN — HYDRALAZINE HYDROCHLORIDE 25 MG: 25 TABLET ORAL at 21:41

## 2025-03-12 ASSESSMENT — ACTIVITIES OF DAILY LIVING (ADL)
ADLS_ACUITY_SCORE: 59
ADLS_ACUITY_SCORE: 62
ADLS_ACUITY_SCORE: 62
ADLS_ACUITY_SCORE: 59
ADLS_ACUITY_SCORE: 62
ADLS_ACUITY_SCORE: 59
ADLS_ACUITY_SCORE: 59
ADLS_ACUITY_SCORE: 45
ADLS_ACUITY_SCORE: 59
ADLS_ACUITY_SCORE: 45
ADLS_ACUITY_SCORE: 62
ADLS_ACUITY_SCORE: 45
ADLS_ACUITY_SCORE: 45
ADLS_ACUITY_SCORE: 59
ADLS_ACUITY_SCORE: 45
ADLS_ACUITY_SCORE: 62
ADLS_ACUITY_SCORE: 59
ADLS_ACUITY_SCORE: 62
ADLS_ACUITY_SCORE: 59

## 2025-03-12 NOTE — PROGRESS NOTES
"Care Management Follow Up    Length of Stay (days): 4    Expected Discharge Date: 03/12/2025     Concerns to be Addressed: discharge planning     Patient plan of care discussed at interdisciplinary rounds: No    Anticipated Discharge Disposition: Home, Home Care              Anticipated Discharge Services: Home Care  Anticipated Discharge DME: None    Patient/family educated on Medicare website which has current facility and service quality ratings: no  Education Provided on the Discharge Plan: No  Patient/Family in Agreement with the Plan: yes    Referrals Placed by CM/SW: Homecare  Private pay costs discussed: insurance costs out of pocket expenses    Discussed  Partnership in Safe Discharge Planning  document with patient/family: No     Handoff Completed: No, handoff not indicated or clinically appropriate    Additional Information:  Writer met with patient at bedside after being alerted by the medicine team that she will discharge with antibiotics for home infusion. Patient states she previously used [facility] and she is aware of out-of-pocket costs. Patient wanted to be sure that we have information about her home care agency to ensure resumption orders to go [facility]. Patient verified her home address for home infusion. Writer sent home infusion investigation referral.    Next Steps: Follow up on home infusion referrals.    ________________    HUMA Moore, SHAHANA  ED/Observation   [facility]  Phone: [phone]  Fax: [phone]    After hours [id number] and After Hours [facility]  Available from 4:00pm - 8:30pm  "

## 2025-03-12 NOTE — PROGRESS NOTES
[facility]  Transplant Infectious Disease Progress Note     Patient:  Luz Thompson, Date of birth 1950, Medical record number [id number]  Date of Visit:  03/12/2025         Assessment and Recommendations:   Recommendations:  - Continue ertapenem 1g IV daily for Klebsiella pneumoniae in the urine while admitted, will plan for a 3 week total course 3/8-3/28   - Clear from an ID perspective to place midline   - Monitor Cr, CBC with diff, LFTs, CRP weekly while on therapy  - Start linezolid 600 mg PO BID for Enterococcus faecalis in the blood, complete a 14 day course from the first day of negative blood cultures 3/9-3/22  - Recommend a surveillance urine culture 1 week after the completion of treatment for pyelonephritis  - ID follow-up is scheduled with Dr. Lundberg on 4/25/2025    Transplant Infectious Disease will continue to follow with you.      Patient was discussed with transplant ID attending Dr Montero.     Katelyn Dawn MD  Infectious diseases PGY-4  Contact via mPowa    Primary team:  Place imaging order(s) requested above prior to discharge.  Contact ID teams about missed ID clinic appointments and/or ongoing therapy needs.  [facility] sites only: Place order panel  OPAT Pharmacy ([facility]) Review and ID Care Transition     Prolonged Parenteral/Oral Antibiotic Recommendations and ID Follow up  This template provides final ID recommendations as of this date.     Infectious Diseases Indication: Pyelonephritis    Antibiotic Information  Name of Antibiotic Dose of Antibiotic1 Anticipated duration Effective start date2 End date   Ertapenem 1g q24 hours 3/8/2025 3/8/2025 3/25/2025                 1.Dose of antibiotic will need to be renally adjusted if creatinine clearance changes  2.Effective start date is the date of adequate therapy with appropriate spectrum    Method of antibiotic delivery:Midline.Is the line being used for another indication besides antimicrobials? No At the  "end of therapy should the line used for antimicrobials be removed or de-accessed? Yes. Selecting \"yes\" will function as written order to remove PICC line or de-access the indwelling line at the end of therapy.    Weekly labs required: Creatinine, CBC with diff, LFTs, and CRP. Dr. Lundberg will follow labs at discharge until ID follow up. Fax labs to ID clinic.    Are there pending microbial tests: No     Imaging for ID follow up: ID Imaging: No.    We will attempt to make OPAT appointments within 2 weeks of discharge based on clinic availability.  Provider: Tamika, timing of visit already scheduled for 4/25/2025    Patients discharged to [facility] will be seen by [facility] Infectious Diseases group if ID follow up is need. [facility] ID academic group is not credentialed there.    ID provider - route this note: \"P PANDA\"    [facility] [place] and [facility] ID Clinic Information:  Phone: [phone]  Fax: [phone] (Attention ID clinic nurses)      Assessment:  #Enterococcus faecalis bacteremia  Presented from home with fever of 102F. She has previously had workup for fever of unknown origin without obvious etiology determine. She presents with fever without clear associated symptoms but on workup was found to have Enterococcus faecalis in 2/2 blood cultures, TTE was negative for endocarditis and HENRIQUE score 1 so did not obtain further imaging. Suspect source is from an episode of multiple loose stools and abdominal pain with transient bacterial translocation. Initially treated to vancomycin, will transition to linezolid after confirming she tolerates a dose inpatient.    #Klebsiella pneumoniae pyelonephritis  Urine culture with Klebsiella pneumoniae and on the liver US 3/8, there is comment of echogenic renal parenchyma and in conjunction of symptoms of intermittent flank pain for a year and pyuria on urine studies for months, favoring treating for an extended course for pyelonephritis. Her " "antibiotic allergies complicate antibiotic choices here, the only oral option she has tolerated fosfomycin is resistant. Will complete course with IV ertapenem due to allergies. At the end of treatment will plan for a surveillance urinary culture.    #Hx coccidioides  Dx in winter 5281-0984, did not tolerate fluconazole due to severe rash. Treated with voriconazole, continued until her return to [place]. CXR with stable calcifications. Recent fungal antibody panel negative though unclear how reliable this is. Voriconazole was stopped 6/2024.     #Hx EBV viremia s/p rituxan 2016  No lymphadenopathy on recent abdomen/pelvis CT. Not detected 1/20/2025     #Numerous reported antimicrobial allergies  Per allergy note 8/2/2019 \"Prick and intradermal and patch testing to fluconazole, doxycycline, azithromycin, penicillins, and cephalosporins with immediate and delayed readings being negative.\" States she will never take fluconazole again. Would be candidate for oral provocation testing in future with pcn. Also notes an allergy to the  capsules, states tolerates pills or liquid formulations, sometimes needs them compounded.    Infectious Disease issues include:  - QTc interval: 410 on 3/10/25  - Bacterial coverage: as above  - Pneumocystis prophylaxis: none  - Serostatus & viral prophylaxis: EBV R+, CMV R-  - Fungal prophylaxis: none, previously voriconazole  - Risk factors to suggest check of Toxoplasma, Strongy, or Schisto serology?: toxoplasma negative 1/20/25  - Immunization status: Up to date on influenza, COVID; due for Tdap  - Gamma globulin status: 1110 on 8/5/2019  - Isolation status: Good hand hygiene. Contact for ESBL history  - Code status: Full Code         Interval History:   No acute events overnight.  No dysuria. Overall is feeling well, has no new complaints. She slept better overnight. Not having the back pain today.   No fevers. No leukocytosis. No further positive blood cultures. The Klebsiella " pneumo isolate is pan-S except ampicillin and fosfomycin. E faecalis is pan-S, including linezolid. She feels more comfortable with the plan to complete IV therapy.      Transplants:  5/22/2002 (Liver), Postoperative day:  8330.  Coordinator Angelika Frausto    Review of Systems:  CONSTITUTIONAL:  no fevers or chills  EYES:  ENT:   RESPIRATORY:  no cough or shortness of breath  CARDIOVASCULAR:  no chest pain  GASTROINTESTINAL:  no diarrhea or constipation  GENITOURINARY:  no dysuria, no flank pain currently but intermittently having back pain that has been over her kidneys  HEME:  INTEGUMENT:  known SCC on left cheek  NEURO:  no headache         Current Medications & Allergies:     Current Facility-Administered Medications   Medication Dose Route Frequency Provider Last Rate Last Admin    apixaban ANTICOAGULANT (ELIQUIS) tablet 5 mg  5 mg Oral BID Madhu Quiñones MD   5 mg at 03/12/25 0753    calcitRIOL (ROCALTROL) capsule 0.25 mcg  0.25 mcg Oral Daily Madhu Quiñones MD   0.25 mcg at 03/12/25 0756    ertapenem (INVanz) 1 g vial to attach to  mL bag  1 g Intravenous Q24H Sindhu Thompson PA-C   Stopped at 03/12/25 0830    ezetimibe (ZETIA) tablet 10 mg  10 mg Oral Daily Madhu Quiñones MD   10 mg at 03/12/25 0755    folic acid (FOLVITE) tablet 1,000 mcg  1,000 mcg Oral Daily Madhu Quiñones MD   1,000 mcg at 03/12/25 0753    gabapentin (NEURONTIN) solution 300 mg  300 mg Oral At Bedtime Madhu Quiñones MD   300 mg at 03/11/25 2124    hydrALAZINE (APRESOLINE) tablet 25 mg  25 mg Oral TID Karin Cast PA-C   25 mg at 03/12/25 0755    ketoconazole (NIZORAL) 2 % shampoo   Topical Once per day on Monday Wednesday Friday Madhu Quiñones MD        levothyroxine (SYNTHROID/LEVOTHROID) tablet 175 mcg  175 mcg Oral Daily Madhu Quiñones MD   175 mcg at 03/12/25 0754    linagliptin (TRADJENTA) tablet 5 mg  5 mg Oral Daily Madhu Quiñones MD   5 mg at 03/12/25 0754    linezolid (ZYVOX) tablet 600 mg  600  [facility] Oral [facility] Karin Cast PA-C        metoprolol succinate ER (TOPROL XL) 24 hr tablet 25 mg  25 mg Oral Daily Karin Cast PA-C   25 mg at 03/12/25 0753    omega-3 acid ethyl esters (LOVAZA) capsule 1 g  1 g Oral BID Ashley Tamez PA-C   1 g at 03/12/25 0755    predniSONE (DELTASONE) tablet 5 mg  5 mg Oral Daily Karin Cast PA-C   5 mg at 03/12/25 0753    sirolimus (GENERIC EQUIVALENT) tablet 1 mg  1 mg Oral Daily Madhu Quiñones MD   1 mg at 03/12/25 0754    sodium chloride (PF) 0.9% PF flush 10 mL  10 mL Intracatheter Q8H Karin Cast PA-C        sodium chloride (PF) 0.9% PF flush 3 mL  3 mL Intracatheter Q8H Madhu Quiñones MD   3 mL at 03/11/25 1131    ursodiol (ACTIGALL) tablet 250 mg  250 mg Oral BID Madhu Quiñones MD   250 mg at 03/12/25 0755       Infusions/Drips:  Current Facility-Administered Medications   Medication Dose Route Frequency Provider Last Rate Last Admin    Patient is already receiving anticoagulation with heparin, enoxaparin (LOVENOX), warfarin (COUMADIN)  or other anticoagulant medication   Does not apply Continuous PRN Madhu Quiñones MD           Allergies   Allergen Reactions    Fluconazole Hives and Itching     Full body hives  **Intradermal skin testing negative**  [See intradermal skin testing results from 8/2/2019]    Mycophenolate Diarrhea and Nausea and Vomiting     Patient stated it was chronic and lasted months      Penicillins Anaphylaxis, Hives, Itching and Rash     **Intradermal skin testing negative**  [See intradermal skin testing results from 8/2/2019]      Simvastatin Muscle Pain (Myalgia)     severe  Other reaction(s): Myalgia caused by statin    Methotrexate Other (See Comments)     Other reaction(s): Sore  Sores in mouth, esophagus, and stomach.       Morphine And Codeine Itching and Other (See Comments)     Psych disturbance  Other reaction(s): Confusion, Mood alteration    Quinolones Anxiety, Dizziness, Headache, Other  (See Comments), Palpitations and Unknown     Other reaction(s): Hyperactive behavior, Lightheadedness, Mood alteration    Dizzy, light headed    Dizziness, shaky, and jumpy    Benadryl [Diphenhydramine Hcl]      Insomnia     Capsules, Empty Gelatin [Gelatin]     Lansoprazole Diarrhea    Azithromycin Itching     [See intradermal skin testing results from 8/2/2019]    Bactrim [Sulfamethoxazole-Trimethoprim] Other (See Comments)     Numb mouth, tingling lips (treated with anti-histamines)    Cephalosporins Itching     [See intradermal skin testing results from 8/2/2019]    Ciprofloxacin Hcl Other (See Comments) and Dizziness     Insomnia, mood lability, Irregular heart beat         Doxycycline Itching and Unknown     [See intradermal skin testing results from 8/2/2019]    Lisinopril Cough    Omeprazole Itching    Tolectin [Nsaids] Rash    Tolmetin Rash and Itching    Tramadol Rash, Hives and Itching            Physical Exam:   Patient Vitals for the past 24 hrs:   BP Temp Temp src Pulse Resp SpO2   03/12/25 0751 (!) 155/65 97.8  F (36.6  C) Oral 64 16 99 %   03/12/25 0000 -- -- -- -- -- 99 %   03/11/25 2230 -- -- -- 69 15 98 %   03/11/25 2112 (!) 155/67 99.2  F (37.3  C) Oral 75 19 99 %   03/11/25 1616 (!) 142/67 98.4  F (36.9  C) Oral 71 20 98 %     Ranges for vital signs:  Temp:  [97.8  F (36.6  C)-99.2  F (37.3  C)] 97.8  F (36.6  C)  Pulse:  [64-75] 64  Resp:  [15-20] 16  BP: (142-155)/(65-67) 155/65  Cuff Mean (mmHg):  [92] 92  SpO2:  [98 %-99 %] 99 %  There were no vitals filed for this visit.    Physical Examination:  GENERAL:  well-developed, well-nourished female, resting in bed in no acute distress.  HEAD:  Head is normocephalic, atraumatic.   EYES:  Eyes have anicteric sclerae without conjunctival injection   LUNGS:  Clear to auscultation bilateral.   CARDIOVASCULAR:  Regular rate and rhythm with no murmur  ABDOMEN:  Soft, nontender.   SKIN:  No acute rashes. 2cm raised plaque on cheek, known SCC. PIV in  place without any surrounding erythema or exudate.  NEUROLOGIC:  Grossly nonfocal. Active x4 extremities         Laboratory Data:     Absolute CD4   Date Value Ref Range Status   08/19/2014 415 mm3 Final   09/16/2013 1,266 mm3 Final   09/15/2013 DUPLICATE,TESTING DONE ON SPECIMEN FROM 9.16.13 mm3 Final   11/13/2008 1332 mm3 Final       Inflammatory & Autoimmune Markers    Recent Labs   Lab Test 01/13/25  1410 06/23/23  1422 06/13/23  1820 08/26/19  2331 05/20/19  0957 07/30/18  0855   SED  --   --   --  78* 47* 73*   CRP  --   --   --  180.0* <2.9 5.2   CRPI 3.26 <3.00   < >  --   --   --     < > = values in this interval not displayed.       Immune Globulin Studies     Recent Labs   Lab Test 08/05/19  1552   IGG 1,110             Metabolic Studies       Recent Labs   Lab Test 03/11/25  2129 03/11/25  1617 03/11/25  1112 03/10/25  1703 03/10/25  0614 03/09/25  1720 03/09/25  1043 03/08/25  1852 03/08/25  1606 01/20/25  1800 01/20/25  1635 12/11/24  0229 12/10/24  2148   NA  --   --   --   --  141  --  141 139 138   < >  --    < >  --    POTASSIUM  --   --   --   --  3.9  --  4.0 4.1 4.4   < >  --    < >  --    CHLORIDE  --   --   --   --  109*  --  108* 105 103   < >  --    < >  --    CO2  --   --   --   --  21*  --  20* 15* 24   < >  --    < >  --    ANIONGAP  --   --   --   --  11  --  13 19* 11   < >  --    < >  --    BUN  --   --   --   --  39.0*  --  38.5* 48.1* 51.3*   < >  --    < >  --    CR  --   --  1.19*  --  1.43*  --  1.34* 1.49* 1.56*   < >  --    < >  --    GFRESTIMATED  --   --  47*  --  38*  --  41* 36* 34*   < >  --    < >  --    *   < >  --    < > 106*   < > 136* 188* 183*   < >  --    < >  --    A1C  --   --   --   --   --   --   --   --   --   --   --   --  6.9*   KEILY  --   --   --   --  9.0  --  8.9 8.8 10.4   < >  --    < >  --    PHOS  --   --   --   --   --   --  2.8  --   --   --   --   --   --    MAG  --   --   --   --   --   --  4.0*  --   --   --   --   --   --     LACT  --   --   --   --   --   --   --  1.0 2.2*  --   --    < >  --    PCAL  --   --   --   --   --   --   --   --  0.11  --   --    < >  --    FGTL  --   --   --   --   --   --   --   --   --   --  37  --   --     < > = values in this interval not displayed.       Hepatic Studies    Recent Labs   Lab Test 03/10/25  0614 03/09/25  1043 03/08/25  1852 03/08/25  1606 01/22/25  0632 01/20/25  0614   BILITOTAL 0.2 0.4 0.6   < > 0.2  --    DBIL  --   --   --   --  <0.20  --    ALKPHOS 97 102 108   < > 103  --    PROTTOTAL 5.7* 6.1* 6.1*   < > 5.9*  --    ALBUMIN 3.0* 3.2* 3.3*   < > 3.1*  --    AST 37 46* 92*   < > 18  --    ALT 87* 84* 86*   < > 22  --    LDH  --   --   --   --   --  266*    < > = values in this interval not displayed.       Pancreatitis testing    Recent Labs   Lab Test 01/03/25  1733 12/10/24  1446 09/04/24  1008 09/18/20  0000 08/26/19  2331   LIPASE 29 33  --   --  66*   TRIG  --   --  329*   < >  --     < > = values in this interval not displayed.       Gout Labs      Recent Labs   Lab Test 08/05/19  1552   URIC 5.6       Hematology Studies   Recent Labs   Lab Test 03/11/25  0923 03/10/25  0614 03/09/25  1043 03/08/25  1606 02/06/25  1600 01/04/25  0742 01/03/25  1733 10/11/24  2212 09/30/24  1545 06/13/23  1820 12/05/22  0954   WBC 7.1 8.9 15.1* 18.9* 7.8   < > 9.2   < > 8.2   < >  --    05028  --   --   --   --   --   --   --   --   --   --  9.0   ANEU  --   --   --   --   --   --  6.9  --  3.7  --   --    ANEUTAUTO  --   --   --  16.4* 5.1   < >  --    < >  --    < >  --    ALYM  --   --   --   --   --   --  1.0  --  2.7   < >  --    ALYMPAUTO  --   --   --  0.9 1.8   < >  --    < >  --    < >  --    KATHY  --   --   --   --   --   --  1.0  --  1.0   < >  --    AMONOAUTO  --   --   --  1.3 0.7   < >  --    < >  --    < >  --    AEOS  --   --   --   --   --   --  0.0  --  0.2   < >  --    AEOSAUTO  --   --   --  0.0 0.0   < >  --    < >  --    < >  --    ABSBASO  --   --   --  0.0 0.0   < >  --     < >  --    < >  --    HGB 9.8* 9.4* 10.5* 10.8* 10.6*   < > 10.2*   < > 11.1*   < >  --    46589  --   --   --   --   --   --   --   --   --   --  11.9   HCT 32.7* 31.0* 34.2* 34.2* 32.7*   < > 32.2*   < > 35.0   < >  --     305 323 346 395   < > 418   < > 388   < >  --    43679  --   --   --   --   --   --   --   --   --   --  406    < > = values in this interval not displayed.       Strongyloides testing  Recent Labs   Lab Test 03/08/25  1606 02/06/25  1600 01/06/25  2306 01/03/25  1733 10/11/24  2212 09/30/24  1545   EOSINOPHIL 0 0   < > 0   < > 3   AEOS  --   --   --  0.0  --  0.2   AEOSAUTO 0.0 0.0   < >  --    < >  --     < > = values in this interval not displayed.       Clotting Studies    Recent Labs   Lab Test 01/04/25  1537 01/04/25  0742 01/03/25  1733 08/26/19  2331 05/18/18  0731   INR  --   --  1.23* 1.33* 1.06   PTT 31   < >  --  38* 33    < > = values in this interval not displayed.       Iron Testing    Recent Labs   Lab Test 03/11/25  0923 09/04/24  1008 06/15/23  1028 08/26/19  2331 08/05/19  1552 07/30/18  0913 07/30/18  0855   IRON  --   --   --   --  56  --  39   FEB  --   --   --   --  267  --  307   IRONSAT  --   --   --   --  21  --  13*   LAURA  --   --   --   --  118  --  18   MCV 90   < > 92   < > 88   < >  --    B12  --   --  456  --   --   --   --     < > = values in this interval not displayed.       Blood Gas Testing    Recent Labs   Lab Test 01/06/25 2302 01/03/25  1735   PHV 7.50* 7.47*   PCO2V 27* 28*   PO2V 37 42   HCO3V 21 20*        Thyroid Studies     Recent Labs   Lab Test 01/20/25  1635 11/14/24  1506 05/20/19  0957 10/16/18  0902 07/30/18  0855   TSH 1.53 3.31 12.11* 18.39* 8.64*   T4  --  1.92* 0.99 0.85 1.02   T3  --   --   --   --  44*       Urine Studies     Recent Labs   Lab Test 03/08/25  1737 02/07/25  0215 01/22/25  1727 01/21/25  1422 01/19/25  2100   URINEPH 6.0 5.5 5.5 5.5 6.0   NITRITE Positive* Negative Negative Negative Negative   LEUKEST Large*  "Small* Large* Large* Trace*   WBCU >182* 10-25* >182* >182* 7*   WBC CLUMPS Present*  --  Present*  --   --        CSF testing   No lab results found.    Invalid input(s): \"CADAM\", \"EVPCR\", \"ENTPCR\", \"ENTEROVIRUS\"    Medication levels    Recent Labs   Lab Test 03/11/25  1112 03/10/25  0614 01/06/25  2306 09/04/24  1008 08/29/19  0544 04/17/18  0942 10/23/17  1025   VANCOMYCIN 19.4   < >  --   --   --   --   --    VCON  --   --   --   --  2.2   < >  --    CYCLSP  --   --   --   --   --   --  Canceled, Test credited   RAPAMY  --   --  4.5*   < > 9.8   < >  --     < > = values in this interval not displayed.       Body fluid stats  No lab results found.    Microbiology:  Fungal testing  Recent Labs   Lab Test 01/20/25  1635 01/13/25  1425 01/13/25  1410   HISGAQNTUR  --  Not Detected  --    FGTL 37  --   --    FGTLI Negative  --   --    COFUNG  --   --  <1:2   ASPA  --   --  <1:8   HISTOMYCF  --   --  <1:8   HISTOYEACF  --   --  <1:8   FUNBL  --   --  0.6       Last Culture results   Rapid Strep A Screen   Date Value Ref Range Status   10/05/2005   Final    NEGATIVE: No Group A streptococcal antigen detected by immunoassay, await     Comment:      culture report.  Critical Value called to and read back by Theodora in Transplant clinic 13:20pm   10/5/05 ()     Culture   Date Value Ref Range Status   03/09/2025 No growth after 2 days  Preliminary   03/09/2025 No growth after 2 days  Preliminary   03/08/2025 No growth after 3 days  Preliminary   03/08/2025 >100,000 CFU/mL Klebsiella pneumoniae (A)  Final   03/08/2025 Positive on the 1st day of incubation (A)  Final   03/08/2025 Enterococcus faecalis (AA)  Final     Comment:     2 of 2 bottles   02/07/2025 10,000-50,000 CFU/mL Mixture of Urogenital Louann  Final   01/22/2025 10,000-50,000 CFU/mL Mixture of urogenital louann  Final   01/21/2025 >100,000 CFU/mL Mixture of Urogenital Louann  Final   01/20/2025 No Growth  Final   01/20/2025 No Growth  Final   01/20/2025 No Growth  " Final   01/19/2025 <10,000 CFU/mL Mixture of Urogenital Louann  Final   01/19/2025 No Growth  Final   01/19/2025 Positive on the 1st day of incubation (A)  Final   01/19/2025 Staphylococcus epidermidis (AA)  Final     Comment:     1 of 2 bottles  The recovery of this organism from a single blood culture bottle most likely represents contamination. If susceptibility testing is needed, please refer to the antibiogram or contact IDDL. If contamination is suspected, it is important to repeat two sets of blood cultures from two different sites.   01/10/2025 <10,000 CFU/mL Urogenital louann  Final   01/07/2025 No Growth  Final   01/06/2025 No Growth  Final   01/03/2025 No Growth  Final     Culture Micro   Date Value Ref Range Status   08/30/2019 No growth  Final   08/29/2019 No growth  Final   08/28/2019 No growth  Final   08/28/2019 No growth  Final   08/27/2019 10,000 to 50,000 colonies/mL  Escherichia coli   (A)  Final   08/27/2019 (A)  Final    Cultured on the 1st day of incubation:  Escherichia coli  Susceptibility testing done on previous specimen     08/27/2019   Final    Critical Value/Significant Value, preliminary result only, called to and read back by   Maria Teresa Martines RN 08/27/2019 @1425 dk/hospitals     08/26/2019 (A)  Final    Cultured on the 1st day of incubation:  Escherichia coli     08/26/2019   Final    Critical Value/Significant Value, preliminary result only, called to and read back by  Maria Teresa Martines RN [facility] at 1229 8.27.19.DK     08/26/2019   Final    (Note)  POSITIVE for E.COLI by Verigene multiplex nucleic acid test. Final  identification and antimicrobial susceptibility testing will be  verified by standard methods. Verigene test will not distinguish  E.coli from Shigella species including S.dysenteriae, S.flexneri,  S.boydii, and S.sonnei. Specimens containing Shigella species or  E.coli will be reported as Positive for E.coli.    Specimen tested with Verigene multiplex, gram-negative blood culture  nucleic  "acid test for the following targets: Acinetobacter sp.,  Citrobacter sp., Enterobacter sp., Proteus sp., E. coli, K.  pneumoniae/oxytoca, P. aeruginosa, and the following resistance  markers: CTXM, KPC, NDM, VIM, IMP and OXA.    Critical Value/Significant Value called to and read back by  Flower Lujan Rn @ 1449 8.27.19          Escherichia coli   Date Value Ref Range Status   09/22/2024 Not Detected Not Detected Final     Enterococcus faecium   Date Value Ref Range Status   03/08/2025 Not Detected Not Detected Final           Last checks of Clostridioides difficile testing  No lab results found.    Infection Studies to assess Diarrhea No lab results found.    Invalid input(s): \"BIDYD\"    Virology:  Coronavirus-19 testing    Recent Labs   Lab Test 03/08/25  1549 01/19/25  1908 01/06/25  2309 01/03/25  1746   [id number] Negative Negative Negative Negative       Respiratory virus (non-coronavirus-19) testing    Recent Labs   Lab Test 03/08/25  1549   IFLUA Not Detected   INFZA Negative   FLUAH1 Not Detected   PB4119 Not Detected   FLUAH3 Not Detected   IFLUB Not Detected   INFZB Negative   PIV1 Not Detected   PIV2 Not Detected   PIV3 Not Detected   PIV4 Not Detected   IRSV Negative   RSVA Not Detected   RSVB Not Detected   HMPV Not Detected   RHINEV Not Detected   ADENOV Not Detected   MOHR Not Detected       Viral loads    Recent Labs   Lab Test 01/20/25  1635 08/28/19  0528 07/30/18  0856   EBQI Not Detected  --   --    EBRES  --  2,386*  --    CMVQNT Not Detected  --  CMV DNA Not Detected   CMVLOG  --   --  Not Calculated       CMV resistance testing  No lab results found.    No results found for: \"H6RES\"    BK Virus Testing   No lab results found.    Parvovirus Testing    Recent Labs   Lab Test 01/20/25  1635   PRVG 1.82*   PRVM 0.16   PRVPC Not Detected       Adenovirus Testing  No lab results found.    Invalid input(s): \"ADENAB\", \"ADENOVIRUS\", \"ADQT\"    Imaging:  Recent Results (from the past 48 hours)   US " Liver Transplant Follow Up    Narrative    EXAMINATION: US LIVER TRANSPLANT FOLLOW UP, 3/10/2025 2:25 PM     COMPARISON: 8/27/2019, 5/13/2016    HISTORY: Follow up on liver transplant in the setting of infection and  c/f obstruction/infection source and assess flow.     TECHNIQUE:  Gray-scale, color Doppler and spectral flow analysis.    FINDINGS:   There is no ascites.    Liver:   The liver demonstrates normal homogeneous echotexture. No  evidence of a focal hepatic mass.     Bile Ducts: Intrahepatic biliary system is of normal caliber.  The  common bile duct is not visualized.     Gallbladder: The gallbladder is surgically absent.    Right kidney:  The right kidney demonstrates echogenic renal  parenchyma with no evidence of a shadowing stone, focal mass or  hydronephrosis.   11.0 cm in long axis dimension.    Pancreas: The pancreas is not visualized.    Visualized portions of the aorta are obscured.    LIVER DOPPLER:  Splenic vein:  Patent continuous normal antegrade direction flow  towards the liver, 21 cm/sec.  Extrahepatic portal vein:  Patent continuous antegrade flow, 21  cm/sec.  Portal vein at anastomosis: Patent continuous antegrade flow, 25  cm/sec.  Intrahepatic portal vein:  Patent continuous antegrade flow, 26  cm/sec.  Right portal vein flow is antegrade, measuring 21 cm/sec.  Left portal vein is not visualized.     Inferior vena cava: patent with flow toward the heart throughout..  IVC above anastomosis:  98 cm/sec.  IVC at anastomosis:  45 cm/sec.  Intrahepatic IVC:  39 cm/sec.  Extrahepatic IVC:  40 cm/sec.    Right, mid, left hepatic veins: Patent with flow towards the inferior  vena cava.    Extrahepatic hepatic artery: Low resistance waveform with flow towards  the liver. 56/13 cm/sec with resistive index 0.77.  Right hepatic artery: 57/12 cm/sec with resistive index 0.79.  Left hepatic artery is not visualized.      Impression    Impression:   1.  Patent Doppler ultrasound of the transplant  liver. The left portal  vein and left hepatic artery were not visualized.  2.  Unremarkable grayscale evaluation of the transplant liver. The  common bile duct was not visualized.  3.  Echogenic right renal parenchyma suggestive of medical renal  disease.    I have personally reviewed the examination and initial interpretation  and I agree with the findings.    KAYLIE BLAS MD         SYSTEM ID:  [id number]

## 2025-03-12 NOTE — SUMMARY OF CARE
Reason for admission: Sepsis  Admitted from: ED  Report received from: Ha RN  2 RN skin assessment completed by: Geena ROUSSEAU and Skylar FUNG      - Findings (add LDA if needed): Blanchable redness on bilateral heels and sacrum/buttocks  Bed algorithm reevaluated: Yes  Was airflow pump ordered?: No  Suction set up in room?: Yes  Care plan (primary problem) and education initiated: Yes  MDRO education done if applicable: Yes  Pt informed about policy regarding no IV pumps off unit: Yes  Flu shot ordered? (October-April only): No  Detailed Belongings: See NST summary of care note

## 2025-03-12 NOTE — PROGRESS NOTES
Called RN to check if midline is accepted by home infusion company for home IV therapy.  Waiting for call back per conversation.    1200 ok to place midline for home infusion per RN

## 2025-03-12 NOTE — SUMMARY OF CARE
Pt arrived to 7C with beti, phone and hearing aid chargers, hearing aids, slippers, two bags, tablet, phone, purse, and glasses.

## 2025-03-12 NOTE — PROGRESS NOTES
[facility]  Parenteral ANtibiotic Review at Departure from Acute Care Collaborative Note     Patient: Luz Thompson  MRN: [id number]  Allergies: Fluconazole; Mycophenolate; Penicillins; Simvastatin; Methotrexate; Morphine and codeine; Quinolones; Benadryl [diphenhydramine hcl]; Capsules, empty gelatin [gelatin]; Lansoprazole; Azithromycin; Bactrim [sulfamethoxazole-trimethoprim]; Cephalosporins; Ciprofloxacin hcl; Doxycycline; Lisinopril; Omeprazole; Tolectin [nsaids]; Tolmetin; and Tramadol    Current Location: ED  OPAT to be provided by: [facility]       Line Type: Midline    Diagnosis/Indications: pyelonephritis  Organism(s): Klebsiella pneumoniae  MRDO? No  Pending Cultures/Microbiological Tests: yes 3/9 blood culture; 3/8 blood culture    Inpatient ID involved in developing OPAT plan: Yes - discharge OPAT plan has no changes from ID provider, Dr. Dawn, OPAT plan charted on 3/12/2025    Outpatient ID Follow-up: ID OPAT Clinic Referral Placed ([facility] Ph: [phone] and Fax: [phone]) - appointment scheduled  Designated Provider: Dr. Lundberg    Antimicrobial Regimen / Route Anticipated  Duration Start Date Stop /  Reassess Date   Ertapenem 1 g every 24 hours/IV 3 weeks 3/8/2025 3/28/2025     Laboratory Tests and Monitoring Frequency: CBC with Diff, SCr, LFTs, CRP, Other (Urine culture 1 week following end of IV Abx) Once Weekly    ID Pharmacist Interventions: Therapy Duration                          Vikram Huddleston [facility]  Pager: [phone]

## 2025-03-12 NOTE — PROGRESS NOTES
[facility]    Medicine Progress Note - Hospitalist Service, GOLD TEAM 1    Date of Admission:  3/8/2025    Updates today:  - midline placed per vascular access  - trialing oral linezolid, benadryl available as known hx of multiple Abx allergies  -Appreciate ID following, continue to monitor cultures  -Patient appropriate to return home with IV Abx once arranged   - appreciate SW assistance with home infusion    Assessment & Plan   Luz Thompson is a 74F w/ PMH of primary biliary cirrhosis s/p liver transplant (2002), paroxysmal atrial fibrillation on Eliquis, CVA, CKD, HTN, DM2, PAD, hypothyroidism who was admitted with sepsis.      Sepsis-resolved  Klebsiella pneumoniae UTI  E. faecalis bacteremia (3/8)  Hx of recurrent UTIs  Follows with Urology and ID for recurrent UTIs. Presented with fever of 100.6 DegF, leukocytosis. Urinalysis c/w infection with urine culture now growing >100k colonies Klebsiella pneumoniae, does have a hx of ESBL Klebsiella. 2 of 2 blood cultures growing gram positive cocci. Started meropenem and received 1x dose of vancomycin in ED.   - Transplant ID consult input appreciated   - prn benadryl in case of Abx- induced allergic reaction    - Abx:   - oral linezolid 600mg BID for to complete 14d tx (3/12-)  - ertapenem (3/9-)    - discontinue IV vancomycin (3/9-3/12)   - discontinue meropenem (3/8-3/9)   - Repeat blood cultures x2 (3/8 evening and 3/9)- NGTD   - Follow pending blood and urine cultures   - PT, OT consult input appreciated     Transaminitis  Primary biliary cirrhosis s/p liver transplant (2002)  Labs on admission notable for ALT 61, AST 58 with normal alk phos and Tbili. CT a/p with subtle hypodensity in inferior liver c/f peripheral biliary ductal dilatation or edema.   - Hepatology consult input appreciated, siged off  - No signs of acute cholangitis. But recommend outpatient MRCP to evaluate bile ducts more closely. Pancreatic  cyst can be assessed at that time as well, otherwise that will require MRI follow up in 1 year.  - liver US w/ doppler no acute pathology    - IS:               - Prednisone changed from 10mg daily to 5mg daily per GI recs               - Sirolimus 1 mg daily   - Ursodiol 250 mg BID   - Trend hepatic panel     HTN  HLD  Paroxsymal atrial fibrillation  Blood pressures soft secondary to the above. Bradycardic overnight with HR in 40s, asymptomatic on admission. Remains AVSS. No e/o significant bleeding.    - resumed PTA hydralazine w/ hold parameters  - resume metoprolol w/ parameters   - Continue PTA Eliquis 5 mg BID   - Continue PTA ezetimibe (allergies to statins)   - Telemetry monitoring     DM2  Diabetic neuropathy  A1c 6.9 in December.   Recent Labs   Lab 03/12/25  1457 03/11/25  2129 03/11/25  1912 03/11/25  1617 03/10/25  2319 03/10/25  1703   GLC 94 159* 155* 141* 148* 227*     - glucose checks BID and HS   - Continue PTA linagliptin   - Continue PTA gabapentin   - BG checks BID. No need for sliding scale insulin currently, initiate if BG persistently >180     CKD IIIb: Creatinine stable within baseline range of ~1.4 - 1.6. Cr improved to 1.1 (3/11) in the setting of above - Next scheduled with Nephrology on 4/2/25.      Hypothyroidism: TSH normal (1/20/2025) continue PTA levothyroxine.      SCC of left cheek: Scheduled for MOHS excision with Dr. May on 4/1/25.     Cardiac murmur-documented per patient's chart and TTE as per above without concerning pathology, murmur not appreciated on assessment today    Osteoarthritis  Physical deconditioning-appreciate PT and OT input, patient is appropriate for return to home on discharge            Diet: High Consistent Carb (75 g CHO per Meal) Diet  Snacks/Supplements Adult: Danna Etta Standard Oral Supplement; With Meals  Diet    DVT Prophylaxis: DOAC  Ron Catheter: Not present  Lines: PRESENT           Cardiac Monitoring: ACTIVE order. Indication: Bradycardias  "(48 hours)  Code Status: Full Code      Clinically Significant Risk Factors               # Hypoalbuminemia: Lowest albumin = 3 g/dL at 3/10/2025  6:14 AM, will monitor as appropriate     # Hypertension: Noted on problem list           # DMII: A1C = N/A within past 6 months, PRESENT ON ADMISSION  # Overweight: Estimated body mass index is 26.23 kg/m  as calculated from the following:    Height as of 2/14/25: 1.715 m (5' 7.5\").    Weight as of 2/12/25: 77.1 kg (170 lb)., PRESENT ON ADMISSION       # Financial/Environmental Concerns: none         Social Drivers of Health    Housing Stability: Low Risk  (1/27/2025)    Housing Stability     Do you have housing? : Yes     Are you worried about losing your housing?: No   Recent Concern: Housing Stability - High Risk (1/22/2025)    Housing Stability     Do you have housing? : No     Are you worried about losing your housing?: No   Tobacco Use: Medium Risk (2/12/2025)    Patient History     Smoking Tobacco Use: Former     Smokeless Tobacco Use: Never   Transportation Needs: High Risk (1/27/2025)    Transportation Needs     Within the past 12 months, has lack of transportation kept you from medical appointments, getting your medicines, non-medical meetings or appointments, work, or from getting things that you need?: Yes   Physical Activity: Insufficiently Active (11/9/2023)    Received from [employer]    Exercise Vital Sign     Days of Exercise per Week: 5 days     Minutes of Exercise per Session: 10 min          Disposition Plan     Medically Ready for Discharge: 1-2 days, pending culture data            The patient's care was discussed with the Attending Physician, Dr. Aquino, Bedside Nurse, and Patient and infectious disease providers     Karin Cast PA-C  Hospitalist Service, GOLD TEAM 1  [facility]  Securely message with [employer] (more info)  Text page via [employer] Paging/Directory   See signed in provider for up to date " coverage information  ______________________________________________________________________    Interval History   [place] is seen in the ER again.  She recalls her conversation this morning with Dr. Montero.     She notes a fever overnight, T 99.2 is documented.  She continues to be without other overt signs of systemic infection.  She reports that she did less walking today d/t arthritis pains.  She continues to void without issues.  She is tolerating adequate p.o. food and fluids.      She has had a midline for antibiotics previously and notes that along with a home infusion nurse, she would feel comfortable with home IV antibiotics.     Physical Exam   Vital Signs: Temp: 97.8  F (36.6  C) Temp src: Oral BP: (!) 153/62 Pulse: 64   Resp: 16 SpO2: 96 % O2 Device: None (Room air) Oxygen Delivery: 2 LPM  Weight: 0 lbs 0 oz  GENERAL: Alert and oriented x 3. NAD.  Well-appearing. Cooperative.   HEENT: Anicteric sclera.  Pupils equal round NC. AT.   CV: RRR. S1, S2. No murmurs appreciated.   RESPIRATORY: Effort normal on RA.   NEUROLOGICAL: No focal deficits. Moves all extremities.    EXTREMITIES: No peripheral edema. Intact bilateral pedal pulses.   SKIN: No jaundice. No rashes on exposed skin      Medical Decision Making       45 MINUTES SPENT BY ME on the date of service doing chart review, history, exam, documentation & further activities per the note.      Data         Imaging results reviewed over the past 24 hrs:   No results found for this or any previous visit (from the past 24 hours).

## 2025-03-12 NOTE — PROCEDURES
[facility]    Single Lumen Midline Placement    Date/Time: 3/12/2025 1:09 PM    Performed by: Saleem Frey RN  Authorized by: Rosa Aquino MD  Indications: vascular access      UNIVERSAL PROTOCOL   Site Marked: Yes  Prior Images Obtained and Reviewed:  Yes  Required items: Required blood products, implants, devices and special equipment available    Patient identity confirmed:  Verbally with patient and arm band  NA - No sedation, light sedation, or local anesthesia  Confirmation Checklist:  Patient's identity using two indicators and relevant allergies  Time out: Immediately prior to the procedure a time out was called    Universal Protocol: the Joint Commission Universal Protocol was followed    Preparation: Patient was prepped and draped in usual sterile fashion       ANESTHESIA    Anesthesia:  Local infiltration  Local Anesthetic:  Lidocaine 1% without epinephrine  Anesthetic Total (mL):  5      SEDATION    Patient Sedated: No        Preparation: skin prepped with ChloraPrep  Skin prep agent: skin prep agent completely dried prior to procedure  Sterile barriers: maximum sterile barriers were used: cap, mask, sterile gown, sterile gloves, and large sterile sheet  Hand hygiene: hand hygiene performed prior to central venous catheter insertion  Type of line used: Midline  Catheter type: single lumen  Lumen type: non-valved  Catheter size: 3 Fr  Brand: Bard  Lot number: [id number]  Placement method: venipuncture, MST and ultrasound  Number of attempts: 1  Difficulty threading catheter: no  Successful placement: yes  Orientation: right  Catheter to Vein (%): 30  Location: basilic vein  Tip Location: distal to axillary vein  Arm circumference: adults 10 cm  Extremity circumference: 29  Visible catheter length: 1  Total catheter length: 20  Dressing and securement: adhesive securement device, alcohol impregnated caps, blood removed, dressing  applied, fixation device, gloves changed prior to final dressing, glue, securement device, site cleansed, statlock and transparent securement dressing  Post procedure assessment: blood return through all ports and free fluid flow  PROCEDURE Describe Procedure: Midline ok to use  Patient Tolerance:  Patient tolerated the procedure well with no immediate complications

## 2025-03-13 VITALS
OXYGEN SATURATION: 97 % | RESPIRATION RATE: 18 BRPM | DIASTOLIC BLOOD PRESSURE: 113 MMHG | SYSTOLIC BLOOD PRESSURE: 159 MMHG | HEART RATE: 86 BPM | TEMPERATURE: 98.7 F

## 2025-03-13 LAB
ANION GAP SERPL CALCULATED.3IONS-SCNC: 10 MMOL/L (ref 7–15)
ANION GAP SERPL CALCULATED.3IONS-SCNC: 13 MMOL/L (ref 7–15)
BACTERIA BLD CULT: NORMAL
BUN SERPL-MCNC: 33.3 MG/DL (ref 8–23)
BUN SERPL-MCNC: 35 MG/DL (ref 8–23)
CALCIUM SERPL-MCNC: 8.9 MG/DL (ref 8.8–10.4)
CALCIUM SERPL-MCNC: 9 MG/DL (ref 8.8–10.4)
CHLORIDE SERPL-SCNC: 109 MMOL/L (ref 98–107)
CHLORIDE SERPL-SCNC: 112 MMOL/L (ref 98–107)
CREAT SERPL-MCNC: 1.24 MG/DL (ref 0.51–0.95)
CREAT SERPL-MCNC: 1.3 MG/DL (ref 0.51–0.95)
EGFRCR SERPLBLD CKD-EPI 2021: 43 ML/MIN/1.73M2
EGFRCR SERPLBLD CKD-EPI 2021: 45 ML/MIN/1.73M2
GLUCOSE BLDC GLUCOMTR-MCNC: 140 MG/DL (ref 70–99)
GLUCOSE BLDC GLUCOMTR-MCNC: 170 MG/DL (ref 70–99)
GLUCOSE BLDC GLUCOMTR-MCNC: 91 MG/DL (ref 70–99)
GLUCOSE SERPL-MCNC: 102 MG/DL (ref 70–99)
GLUCOSE SERPL-MCNC: 105 MG/DL (ref 70–99)
HCO3 SERPL-SCNC: 22 MMOL/L (ref 22–29)
HCO3 SERPL-SCNC: 22 MMOL/L (ref 22–29)
POTASSIUM SERPL-SCNC: 4.4 MMOL/L (ref 3.4–5.3)
POTASSIUM SERPL-SCNC: 4.6 MMOL/L (ref 3.4–5.3)
SODIUM SERPL-SCNC: 144 MMOL/L (ref 135–145)
SODIUM SERPL-SCNC: 144 MMOL/L (ref 135–145)
VANCOMYCIN SERPL-MCNC: 14.2 UG/ML

## 2025-03-13 PROCEDURE — 999N000007 HC SITE CHECK

## 2025-03-13 PROCEDURE — 80048 BASIC METABOLIC PNL TOTAL CA: CPT | Performed by: PHYSICIAN ASSISTANT

## 2025-03-13 PROCEDURE — 250N000011 HC RX IP 250 OP 636: Performed by: PHYSICIAN ASSISTANT

## 2025-03-13 PROCEDURE — 99232 SBSQ HOSP IP/OBS MODERATE 35: CPT | Mod: FS | Performed by: PEDIATRICS

## 2025-03-13 PROCEDURE — 82374 ASSAY BLOOD CARBON DIOXIDE: CPT | Performed by: PHYSICIAN ASSISTANT

## 2025-03-13 PROCEDURE — 36415 COLL VENOUS BLD VENIPUNCTURE: CPT

## 2025-03-13 PROCEDURE — 250N000012 HC RX MED GY IP 250 OP 636 PS 637: Performed by: PHYSICIAN ASSISTANT

## 2025-03-13 PROCEDURE — 258N000003 HC RX IP 258 OP 636: Performed by: PHYSICIAN ASSISTANT

## 2025-03-13 PROCEDURE — 250N000013 HC RX MED GY IP 250 OP 250 PS 637: Performed by: STUDENT IN AN ORGANIZED HEALTH CARE EDUCATION/TRAINING PROGRAM

## 2025-03-13 PROCEDURE — 250N000013 HC RX MED GY IP 250 OP 250 PS 637: Performed by: PHYSICIAN ASSISTANT

## 2025-03-13 PROCEDURE — 250N000012 HC RX MED GY IP 250 OP 636 PS 637: Performed by: STUDENT IN AN ORGANIZED HEALTH CARE EDUCATION/TRAINING PROGRAM

## 2025-03-13 PROCEDURE — 80202 ASSAY OF VANCOMYCIN: CPT

## 2025-03-13 PROCEDURE — 120N000002 HC R&B MED SURG/OB UMMC

## 2025-03-13 RX ADMIN — URSODIOL 250 MG: 250 TABLET ORAL at 08:58

## 2025-03-13 RX ADMIN — METOPROLOL SUCCINATE 25 MG: 25 TABLET, EXTENDED RELEASE ORAL at 08:58

## 2025-03-13 RX ADMIN — OMEGA-3-ACID ETHYL ESTERS 1 G: 1 CAPSULE, LIQUID FILLED ORAL at 08:59

## 2025-03-13 RX ADMIN — ERTAPENEM SODIUM 1 G: 1 INJECTION, POWDER, LYOPHILIZED, FOR SOLUTION INTRAMUSCULAR; INTRAVENOUS at 06:13

## 2025-03-13 RX ADMIN — CALCITRIOL CAPSULES 0.25 MCG 0.25 MCG: 0.25 CAPSULE ORAL at 08:58

## 2025-03-13 RX ADMIN — APIXABAN 5 MG: 5 TABLET, FILM COATED ORAL at 20:56

## 2025-03-13 RX ADMIN — HYDRALAZINE HYDROCHLORIDE 25 MG: 25 TABLET ORAL at 20:56

## 2025-03-13 RX ADMIN — SIROLIMUS 1 MG: 1 TABLET, FILM COATED ORAL at 08:59

## 2025-03-13 RX ADMIN — EZETIMIBE 10 MG: 10 TABLET ORAL at 08:58

## 2025-03-13 RX ADMIN — OMEGA-3-ACID ETHYL ESTERS 1 G: 1 CAPSULE, LIQUID FILLED ORAL at 20:57

## 2025-03-13 RX ADMIN — HYDRALAZINE HYDROCHLORIDE 25 MG: 25 TABLET ORAL at 14:36

## 2025-03-13 RX ADMIN — HYDRALAZINE HYDROCHLORIDE 25 MG: 25 TABLET ORAL at 08:58

## 2025-03-13 RX ADMIN — LEVOTHYROXINE SODIUM 175 MCG: 0.17 TABLET ORAL at 08:58

## 2025-03-13 RX ADMIN — FOLIC ACID 1000 MCG: 1 TABLET ORAL at 08:58

## 2025-03-13 RX ADMIN — PREDNISONE 5 MG: 5 TABLET ORAL at 08:58

## 2025-03-13 RX ADMIN — APIXABAN 5 MG: 5 TABLET, FILM COATED ORAL at 08:58

## 2025-03-13 RX ADMIN — VANCOMYCIN HYDROCHLORIDE 750 MG: 1 INJECTION, POWDER, LYOPHILIZED, FOR SOLUTION INTRAVENOUS at 16:38

## 2025-03-13 RX ADMIN — GABAPENTIN 300 MG: 250 SOLUTION ORAL at 20:58

## 2025-03-13 RX ADMIN — URSODIOL 250 MG: 250 TABLET ORAL at 20:56

## 2025-03-13 RX ADMIN — LINAGLIPTIN 5 MG: 5 TABLET, FILM COATED ORAL at 08:58

## 2025-03-13 ASSESSMENT — ACTIVITIES OF DAILY LIVING (ADL)
ADLS_ACUITY_SCORE: 51

## 2025-03-13 NOTE — PROGRESS NOTES
Care Management Follow Up    Length of Stay (days): 5    Expected Discharge Date: 03/14/2025     Concerns to be Addressed: discharge planning     Patient plan of care discussed at interdisciplinary rounds: Yes    Anticipated Discharge Disposition: Home      Anticipated Discharge Services: Home Care    [facility]   Roz Josue RN Clinical    Email: [email or web address]   M: [phone]  O: [phone]  F: [phone]     Anticipated Discharge DME: None    Patient/family educated on Medicare website which has current facility and service quality ratings: no  Education Provided on the Discharge Plan: No  Patient/Family in Agreement with the Plan: yes    Referrals Placed by CM/SW: Homecare  Private pay costs discussed: Private pay for her home IV ABX    Discussed  Partnership in Safe Discharge Planning  document with patient/family: No     Handoff Completed: Yes, [facility] PCP: Internal handoff referral completed    Additional Information:    Pt will go home with Ertapenem and Vancomycin. Her insurance doesn't cover IVAB. Her out of pocket cost is $406.12/wk per [facility]. Pt agreed to pay privately and can learn to do it herself in her ILF. Roz from [facility] will do the teaching.    Provider made aware that she will needs PICC for Vanco, currently has midline.    Next Steps:     [] Update [facility]  [] Needs HC resumption order   [] Update [facility]  [] VINOD Caro RN    7C RN Coordinator  Phone: [phone]  3/13/2025  Units: 7C Med Surg 7401 thru 7418 RNCC     Social Work and Care Management Department   SEARCHABLE in [employer] - search CARE COORDINATOR   [facility] ([phone]) Saturday & Sunday; ([phone]) [facility] Recognized Holidays   Units: 5A Onc 5201 - 5219 RNCC,  5A Onc 5220 thru 5240 RNCC, 5C OFFSERVICE [phone] RNCC & 5C OFF SERVICE [phone] RNCC   Units: 6B Vocera, 6C Card 6401 thru 6420 RNCC, 6C Card 6502 thru 6514 RNCC & 6C Card 6515 thru 6519 RNCC     Units: 7A SOT RNCC Vocera, 7B Med Surg Vocera, & 7C Med Surg 7502 thru 7521 RNCC   Units: 6A Vocera & 4A CVICU Vocera, 4C MICU Vocera, and 4E SICU Vocera     Units: 5 Ortho Vocera & 5 Med Surg Vocera    Units: 6 Med Surg Vocera & 8 Med Surg Vocera

## 2025-03-13 NOTE — PHARMACY-VANCOMYCIN DOSING SERVICE
Pharmacy Vancomycin Initial Note  Date of Service 2025  Patient's  1950  74 year old, female    Indication: Bacteremia    Current estimated CrCl = Estimated Creatinine Clearance: 42.6 mL/min (A) (based on SCr of 1.3 mg/dL (H)).    Creatinine for last 3 days  3/11/2025:  9:23 AM Creatinine 1.24 mg/dL; 11:12 AM Creatinine 1.19 mg/dL  3/13/2025: 11:30 AM Creatinine 1.30 mg/dL    Recent Vancomycin Level(s) for last 3 days  3/11/2025: 11:12 AM Vancomycin 19.4 ug/mL  3/13/2025: 11:30 AM Vancomycin 14.2 ug/mL (~45 hour level from previous vancomycin course)      Vancomycin IV Administrations (past 72 hours)                     vancomycin (VANCOCIN) 750 mg in sodium chloride 0.9 % 282.5 mL intermittent infusion (mg) 750 mg New Bag 25 1638    vancomycin (VANCOCIN) 750 mg in sodium chloride 0.9 % 282.5 mL intermittent infusion (mg) 750 mg New Bag 25 1954     750 mg New Bag 03/10/25 2116                    Nephrotoxins and other renal medications (From now, onward)      Start     Dose/Rate Route Frequency Ordered Stop    25 1530  vancomycin (VANCOCIN) 750 mg in sodium chloride 0.9 % 282.5 mL intermittent infusion         750 mg  over 90 Minutes Intravenous EVERY 24 HOURS 25 1525              Contrast Orders - past 72 hours (72h ago, onward)      None            [employer] Prediction of Planned Initial Vancomycin Regimen  Regimen: 750 mg IV every 24 hours.  Start time: 16:38 on 03/15/2025  Exposure target: AUC24 (range)400-600 mg/L.hr   AUC24,ss: 492 mg/L.hr  Probability of AUC24 > 400: 99 %  Ctrough,ss: 16.7 mg/L  Probability of Ctrough,ss > 20: 6 %  Probability of nephrotoxicity (Lodise VERA ): 12 %    Discussed with ID pharmacists given that level was still 14.2 micrograms/mL today ~45 hours after last dose of 750 mg. They were in agreement with continuing dose of 750 mg q24h with close follow up given predicted AUC within range, still a good model fit per [employer].          Plan:  Start vancomycin  750 mg IV q24h.   Vancomycin monitoring method: AUC  Vancomycin therapeutic monitoring goal: 400-600 mg*h/L  Plan for patient to discharge tomorrow, 3/14. Recommend level with home infusion 3/17.   Serum creatinine levels will be ordered daily for the first week of therapy and at least twice weekly for subsequent weeks.      Lisa Carreno, CourtneyD, BCPS

## 2025-03-13 NOTE — PLAN OF CARE
[id number]. A&Ox4. VSS on RA. [place]. Denied pain. Ax1 to BSC. BM x2. Consistent carb diet - good appetite. R midline saline locked. First vanco dose administered. Plan to discharge home tomorrow w/ home infusion.     Goal Outcome Evaluation:      Plan of Care Reviewed With: patient    Overall Patient Progress: improving

## 2025-03-13 NOTE — PROGRESS NOTES
[facility]  Transplant Infectious Disease Progress Note     Patient:  Luz Thompson, Date of birth 1950, Medical record number [id number]  Date of Visit:  03/13/2025         Assessment and Recommendations:   Recommendations:  - Continue ertapenem 1g IV daily for Klebsiella pneumoniae in the urine while admitted, will plan for a 3 week total course 3/8-3/28   - Clear from an ID perspective to place midline   - Monitor Cr, CBC with diff, LFTs, CRP weekly while on therapy  - Given mouth numbness with trial of linezolid yesterday, prefers to complete course with vancomycin for Enterococcus faecalis in the blood, complete a 14 day course from the first day of negative blood cultures 3/9-3/22   - Vancomycin level now to help with outpatient dosing   - Please dose vancomycin again today, dose per pharmacy   - Recommend a surveillance urine culture 1 week after the completion of treatment for pyelonephritis  - ID follow-up is scheduled with Dr. Lundberg on 4/25/2025    Transplant Infectious Disease will continue to follow with you.      Patient was discussed with transplant ID attending Dr Montero.     Katelyn Dawn MD  Infectious diseases PGY-4  Contact via [employer]    Primary team:  Place imaging order(s) requested above prior to discharge.  Contact ID teams about missed ID clinic appointments and/or ongoing therapy needs.  [facility] sites only: Place order panel  OPAT Pharmacy ([facility]) Review and ID Care Transition     Prolonged Parenteral/Oral Antibiotic Recommendations and ID Follow up  This template provides final ID recommendations as of this date.     Infectious Diseases Indication: Pyelonephritis    Antibiotic Information  Name of Antibiotic Dose of Antibiotic1 Anticipated duration Effective start date2 End date   Ertapenem 1g q24 hours 3/8/2025 3/8/2025 3/28/2025   Vancomycin Pharmacy to dose; was on 750g daily IV 3/9/2025  3/22/2025          1.Dose of antibiotic will need to be  "renally adjusted if creatinine clearance changes  2.Effective start date is the date of adequate therapy with appropriate spectrum    Method of antibiotic delivery:Midline.Is the line being used for another indication besides antimicrobials? No At the end of therapy should the line used for antimicrobials be removed or de-accessed? Yes. Selecting \"yes\" will function as written order to remove PICC line or de-access the indwelling line at the end of therapy.    Weekly labs required: Creatinine, CBC with diff, LFTs, CRP, and Vancomycin level, should get a vancomycin trough on 3/17/2025. Dr. Lundberg will follow labs at discharge until ID follow up. Fax labs to ID clinic.    Are there pending microbial tests: No     Imaging for ID follow up: ID Imaging: No.    We will attempt to make OPAT appointments within 2 weeks of discharge based on clinic availability.  Provider: Tamkia, timing of visit already scheduled for 4/25/2025    Patients discharged to [facility] will be seen by [facility] group if ID follow up is need. N/[facility] ID academic group is not credentialed there.    ID provider - route this note: \"P PANDA\"    [facility] ID Clinic Information:  Phone: [phone]  Fax: [phone] (Attention ID clinic nurses)      Assessment:  #Enterococcus faecalis bacteremia  Presented from home with fever of 102F. She has previously had workup for fever of unknown origin without obvious etiology determine. She presents with fever without clear associated symptoms but on workup was found to have Enterococcus faecalis in 2/2 blood cultures, TTE was negative for endocarditis and HENRIQUE score 1 so did not obtain further imaging. Suspect source is from an episode of multiple loose stools and abdominal pain with transient bacterial translocation. Attempted to transition vancomycin to linezolid however had mouth numbness similar to her adverse reactions to antibiotics that " "makes her uncomfortable with this plan at home. Will complete the 2 week course with vancomycin IV.    #Klebsiella pneumoniae pyelonephritis  Urine culture with Klebsiella pneumoniae and on the liver US 3/8, there is comment of echogenic renal parenchyma and in conjunction of symptoms of intermittent flank pain for a year and pyuria on urine studies for months, favoring treating for an extended course for pyelonephritis. Her antibiotic allergies complicate antibiotic choices here, the only oral option she has tolerated fosfomycin is resistant. Will complete course with IV ertapenem due to allergies. At the end of treatment will plan for a surveillance urinary culture 1 week after ertapenem is complete.    #Hx coccidioides  Dx in winter 3116-2210, did not tolerate fluconazole due to severe rash. Treated with voriconazole, continued until her return to [place]. CXR with stable calcifications. Recent fungal antibody panel negative though unclear how reliable this is. Voriconazole was stopped 6/2024. Cocci antigen was negative.     #Hx EBV viremia s/p rituxan 2016  No lymphadenopathy on recent abdomen/pelvis CT. Not detected 1/20/2025     #Numerous reported antimicrobial allergies  Per allergy note 8/2/2019 \"Prick and intradermal and patch testing to fluconazole, doxycycline, azithromycin, penicillins, and cephalosporins with immediate and delayed readings being negative.\" States she will never take fluconazole again. Would be candidate for oral provocation testing in future with pcn. Also notes an allergy to the  capsules, states tolerates pills or liquid formulations, sometimes needs them compounded.    Infectious Disease issues include:  - QTc interval: 410 on 3/10/25  - Bacterial coverage: as above  - Pneumocystis prophylaxis: none  - Serostatus & viral prophylaxis: EBV R+, CMV R-  - Fungal prophylaxis: none, previously voriconazole  - Risk factors to suggest check of Toxoplasma, Strongy, or Schisto serology?: " toxoplasma negative 1/20/25  - Immunization status: Up to date on influenza, COVID; due for Tdap  - Gamma globulin status: 1110 on 8/5/2019  - Isolation status: Good hand hygiene. Contact for ESBL history  - Code status: Full Code         Interval History:   No acute events overnight.  No dysuria. Overall is feeling well, she does have right flank pain today. She slept better in the new room.  No fevers. No leukocytosis. No further positive blood cultures. The Klebsiella pneumo isolate is pan-S except ampicillin and fosfomycin. E faecalis is pan-S, including linezolid. She feels more comfortable with the plan to complete IV therapy. She had her usual adverse reaction with other antibiotics with linezolid that was mouth numbness, it resolved with benadryl. She would feel more comfortable with IV vancomycin to complete treatment if discharged.      Transplants:  5/22/2002 (Liver), Postoperative day:  8331.  Coordinator Angelika Frausto    Review of Systems:  CONSTITUTIONAL:  no fevers or chills  EYES:  ENT:   RESPIRATORY:  no cough or shortness of breath  CARDIOVASCULAR:  no chest pain  GASTROINTESTINAL:  no diarrhea or constipation  GENITOURINARY:  no dysuria, R flank pain present today  HEME:  INTEGUMENT:  known SCC on left cheek  NEURO:  no headache         Current Medications & Allergies:     Current Facility-Administered Medications   Medication Dose Route Frequency Provider Last Rate Last Admin    apixaban ANTICOAGULANT (ELIQUIS) tablet 5 mg  5 mg Oral BID Madhu Quiñones MD   5 mg at 03/13/25 0858    calcitRIOL (ROCALTROL) capsule 0.25 mcg  0.25 mcg Oral Daily Madhu Quiñones MD   0.25 mcg at 03/13/25 0858    ertapenem (INVanz) 1 g vial to attach to  mL bag  1 g Intravenous Q24H Sindhu Thompson PA-C 0 mL/hr at 03/12/25 0830 1 g at 03/13/25 0613    ezetimibe (ZETIA) tablet 10 mg  10 mg Oral Daily Madhu Quiñones MD   10 mg at 03/13/25 0858    folic acid (FOLVITE) tablet 1,000 mcg  1,000 mcg Oral  Daily Madhu Quiñones MD   1,000 mcg at 03/13/25 0858    gabapentin (NEURONTIN) solution 300 mg  300 mg Oral At Bedtime Madhu Quiñones MD   300 mg at 03/12/25 2142    hydrALAZINE (APRESOLINE) tablet 25 mg  25 mg Oral TID Karin Cast PA-C   25 mg at 03/13/25 0858    ketoconazole (NIZORAL) 2 % shampoo   Topical Once per day on Monday Wednesday Friday Madhu Quiñones MD        levothyroxine (SYNTHROID/LEVOTHROID) tablet 175 mcg  175 mcg Oral Daily Madhu Quiñones MD   175 mcg at 03/13/25 0858    linagliptin (TRADJENTA) tablet 5 mg  5 mg Oral Daily Madhu Quiñones MD   5 mg at 03/13/25 0858    linezolid (ZYVOX) tablet 600 mg  600 mg Oral Q12H LUZMARIA (08/20) Karin Cast PA-C   600 mg at 03/12/25 1401    metoprolol succinate ER (TOPROL XL) 24 hr tablet 25 mg  25 mg Oral Daily Karin Cast PA-C   25 mg at 03/13/25 0858    omega-3 acid ethyl esters (LOVAZA) capsule 1 g  1 g Oral BID Ashley Tamez PA-C   1 g at 03/13/25 0859    predniSONE (DELTASONE) tablet 5 mg  5 mg Oral Daily Karin Cast PA-C   5 mg at 03/13/25 0858    sirolimus (GENERIC EQUIVALENT) tablet 1 mg  1 mg Oral Daily Madhu Quiñones MD   1 mg at 03/13/25 0859    sodium chloride (PF) 0.9% PF flush 10 mL  10 mL Intracatheter Q8H Karin Cast PA-C   10 mL at 03/13/25 0649    sodium chloride (PF) 0.9% PF flush 3 mL  3 mL Intracatheter Q8H Madhu Quiñones MD   3 mL at 03/13/25 0612    ursodiol (ACTIGALL) tablet 250 mg  250 mg Oral BID Madhu Quiñones MD   250 mg at 03/13/25 0858       Infusions/Drips:  Current Facility-Administered Medications   Medication Dose Route Frequency Provider Last Rate Last Admin    Patient is already receiving anticoagulation with heparin, enoxaparin (LOVENOX), warfarin (COUMADIN)  or other anticoagulant medication   Does not apply Continuous PRN Madhu Quiñones MD           Allergies   Allergen Reactions    Fluconazole Hives and Itching     Full body hives  **Intradermal skin testing  negative**  [See intradermal skin testing results from 8/2/2019]    Mycophenolate Diarrhea and Nausea and Vomiting     Patient stated it was chronic and lasted months      Penicillins Anaphylaxis, Hives, Itching and Rash     **Intradermal skin testing negative**  [See intradermal skin testing results from 8/2/2019]      Simvastatin Muscle Pain (Myalgia)     severe  Other reaction(s): Myalgia caused by statin    Methotrexate Other (See Comments)     Other reaction(s): Sore  Sores in mouth, esophagus, and stomach.       Morphine And Codeine Itching and Other (See Comments)     Psych disturbance  Other reaction(s): Confusion, Mood alteration    Quinolones Anxiety, Dizziness, Headache, Other (See Comments), Palpitations and Unknown     Other reaction(s): Hyperactive behavior, Lightheadedness, Mood alteration    Dizzy, light headed    Dizziness, shaky, and jumpy    Benadryl [Diphenhydramine Hcl]      Insomnia     Capsules, Empty Gelatin [Gelatin]     Lansoprazole Diarrhea    Azithromycin Itching     [See intradermal skin testing results from 8/2/2019]    Bactrim [Sulfamethoxazole-Trimethoprim] Other (See Comments)     Numb mouth, tingling lips (treated with anti-histamines)    Cephalosporins Itching     [See intradermal skin testing results from 8/2/2019]    Ciprofloxacin Hcl Other (See Comments) and Dizziness     Insomnia, mood lability, Irregular heart beat         Doxycycline Itching and Unknown     [See intradermal skin testing results from 8/2/2019]    Lisinopril Cough    Omeprazole Itching    Tolectin [Nsaids] Rash    Tolmetin Rash and Itching    Tramadol Rash, Hives and Itching            Physical Exam:   Patient Vitals for the past 24 hrs:   BP Temp Temp src Pulse Resp SpO2   03/13/25 0858 126/53 -- -- 66 -- --   03/13/25 0602 116/54 98.5  F (36.9  C) Axillary 64 16 100 %   03/12/25 2247 133/69 98.4  F (36.9  C) Oral 75 16 99 %   03/12/25 1732 133/64 98.5  F (36.9  C) Oral 73 16 96 %   03/12/25 1415 (!) 153/62 --  -- -- -- 96 %     Ranges for vital signs:  Temp:  [98.4  F (36.9  C)-98.5  F (36.9  C)] 98.5  F (36.9  C)  Pulse:  [64-75] 66  Resp:  [16] 16  BP: (116-153)/(53-69) 126/53  SpO2:  [96 %-100 %] 100 %  There were no vitals filed for this visit.    Physical Examination:  GENERAL:  well-developed, well-nourished female, resting in bed in no acute distress.  HEAD:  Head is normocephalic, atraumatic.   EYES:  Eyes have anicteric sclerae without conjunctival injection   LUNGS:  Clear to auscultation bilateral.   CARDIOVASCULAR:  Regular rate and rhythm with no murmur  ABDOMEN:  Soft, nontender.  BACK: Declined CVA tenderness exam, reports primary team had R CVA tenderness   SKIN:  No acute rashes. 2cm raised plaque on cheek, known SCC. Midline in place without any surrounding erythema or exudate.  NEUROLOGIC:  Grossly nonfocal. Active x4 extremities         Laboratory Data:     Absolute CD4   Date Value Ref Range Status   08/19/2014 415 mm3 Final   09/16/2013 1,266 mm3 Final   09/15/2013 DUPLICATE,TESTING DONE ON SPECIMEN FROM 9.16.13 mm3 Final   11/13/2008 1332 mm3 Final       Inflammatory & Autoimmune Markers    Recent Labs   Lab Test 03/11/25  1112 01/13/25  1410 06/13/23  1820 08/26/19  2331 05/20/19  0957 07/30/18  0855   SED  --   --   --  78* 47* 73*   CRP  --   --   --  180.0* <2.9 5.2   CRPI 18.10* 3.26   < >  --   --   --     < > = values in this interval not displayed.       Immune Globulin Studies     Recent Labs   Lab Test 08/05/19  1552   IGG 1,110             Metabolic Studies       Recent Labs   Lab Test 03/13/25  0925 03/11/25  1617 03/11/25  1112 03/10/25  1703 03/10/25  0614 03/09/25  1720 03/09/25  1043 03/08/25  1852 03/08/25  1606 01/20/25  1800 01/20/25  1635 12/11/24  0229 12/10/24  2148   NA  --   --   --   --  141  --  141 139 138   < >  --    < >  --    POTASSIUM  --   --   --   --  3.9  --  4.0 4.1 4.4   < >  --    < >  --    CHLORIDE  --   --   --   --  109*  --  108* 105 103   <  >  --    < >  --    CO2  --   --   --   --  21*  --  20* 15* 24   < >  --    < >  --    ANIONGAP  --   --   --   --  11  --  13 19* 11   < >  --    < >  --    BUN  --   --   --   --  39.0*  --  38.5* 48.1* 51.3*   < >  --    < >  --    CR  --   --  1.19*  --  1.43*  --  1.34* 1.49* 1.56*   < >  --    < >  --    GFRESTIMATED  --   --  47*  --  38*  --  41* 36* 34*   < >  --    < >  --    GLC 91   < >  --    < > 106*   < > 136* 188* 183*   < >  --    < >  --    A1C  --   --   --   --   --   --   --   --   --   --   --   --  6.9*   KEILY  --   --   --   --  9.0  --  8.9 8.8 10.4   < >  --    < >  --    PHOS  --   --   --   --   --   --  2.8  --   --   --   --   --   --    MAG  --   --   --   --   --   --  4.0*  --   --   --   --   --   --    LACT  --   --   --   --   --   --   --  1.0 2.2*  --   --    < >  --    PCAL  --   --   --   --   --   --   --   --  0.11  --   --    < >  --    FGTL  --   --   --   --   --   --   --   --   --   --  37  --   --     < > = values in this interval not displayed.       Hepatic Studies    Recent Labs   Lab Test 03/10/25  0614 03/09/25  1043 03/08/25  1852 03/08/25  1606 01/22/25  0632 01/20/25  0614   BILITOTAL 0.2 0.4 0.6   < > 0.2  --    DBIL  --   --   --   --  <0.20  --    ALKPHOS 97 102 108   < > 103  --    PROTTOTAL 5.7* 6.1* 6.1*   < > 5.9*  --    ALBUMIN 3.0* 3.2* 3.3*   < > 3.1*  --    AST 37 46* 92*   < > 18  --    ALT 87* 84* 86*   < > 22  --    LDH  --   --   --   --   --  266*    < > = values in this interval not displayed.       Pancreatitis testing    Recent Labs   Lab Test 01/03/25  1733 12/10/24  1446 09/04/24  1008 09/18/20  0000 08/26/19  2331   LIPASE 29 33  --   --  66*   TRIG  --   --  329*   < >  --     < > = values in this interval not displayed.       Gout Labs      Recent Labs   Lab Test 08/05/19  1552   URIC 5.6       Hematology Studies   Recent Labs   Lab Test 03/11/25  0923 03/10/25  0614 03/09/25  1043 03/08/25  1606 02/06/25  1600 01/04/25  0742  01/03/25  1733 10/11/24  2212 09/30/24  1545 06/13/23  1820 12/05/22  0954   WBC 7.1 8.9 15.1* 18.9* 7.8   < > 9.2   < > 8.2   < >  --    62756  --   --   --   --   --   --   --   --   --   --  9.0   ANEU  --   --   --   --   --   --  6.9  --  3.7  --   --    ANEUTAUTO  --   --   --  16.4* 5.1   < >  --    < >  --    < >  --    ALYM  --   --   --   --   --   --  1.0  --  2.7   < >  --    ALYMPAUTO  --   --   --  0.9 1.8   < >  --    < >  --    < >  --    KATHY  --   --   --   --   --   --  1.0  --  1.0   < >  --    AMONOAUTO  --   --   --  1.3 0.7   < >  --    < >  --    < >  --    AEOS  --   --   --   --   --   --  0.0  --  0.2   < >  --    AEOSAUTO  --   --   --  0.0 0.0   < >  --    < >  --    < >  --    ABSBASO  --   --   --  0.0 0.0   < >  --    < >  --    < >  --    HGB 9.8* 9.4* 10.5* 10.8* 10.6*   < > 10.2*   < > 11.1*   < >  --    57738  --   --   --   --   --   --   --   --   --   --  11.9   HCT 32.7* 31.0* 34.2* 34.2* 32.7*   < > 32.2*   < > 35.0   < >  --     305 323 346 395   < > 418   < > 388   < >  --    04961  --   --   --   --   --   --   --   --   --   --  406    < > = values in this interval not displayed.       Strongyloides testing  Recent Labs   Lab Test 03/08/25  1606 02/06/25  1600 01/06/25  2306 01/03/25  1733 10/11/24  2212 09/30/24  1545   EOSINOPHIL 0 0   < > 0   < > 3   AEOS  --   --   --  0.0  --  0.2   AEOSAUTO 0.0 0.0   < >  --    < >  --     < > = values in this interval not displayed.       Clotting Studies    Recent Labs   Lab Test 01/04/25  1537 01/04/25  0742 01/03/25  1733 08/26/19  2331 05/18/18  0731   INR  --   --  1.23* 1.33* 1.06   PTT 31   < >  --  38* 33    < > = values in this interval not displayed.       Iron Testing    Recent Labs   Lab Test 03/11/25  0923 09/04/24  1008 06/15/23  1028 08/26/19  2331 08/05/19  1552 07/30/18  0913 07/30/18  0855   IRON  --   --   --   --  56  --  39   FEB  --   --   --   --  267  --  307   IRONSAT  --   --   --   --  21  --  13*  "  LAURA  --   --   --   --  118  --  18   MCV 90   < > 92   < > 88   < >  --    B12  --   --  456  --   --   --   --     < > = values in this interval not displayed.       Blood Gas Testing    Recent Labs   Lab Test 01/06/25  2302 01/03/25  1735   PHV 7.50* 7.47*   PCO2V 27* 28*   PO2V 37 42   HCO3V 21 20*        Thyroid Studies     Recent Labs   Lab Test 01/20/25  1635 11/14/24  1506 05/20/19  0957 10/16/18  0902 07/30/18  0855   TSH 1.53 3.31 12.11* 18.39* 8.64*   T4  --  1.92* 0.99 0.85 1.02   T3  --   --   --   --  44*       Urine Studies     Recent Labs   Lab Test 03/08/25  1737 02/07/25  0215 01/22/25  1727 01/21/25  1422 01/19/25  2100   URINEPH 6.0 5.5 5.5 5.5 6.0   NITRITE Positive* Negative Negative Negative Negative   LEUKEST Large* Small* Large* Large* Trace*   WBCU >182* 10-25* >182* >182* 7*   WBC CLUMPS Present*  --  Present*  --   --        CSF testing   No lab results found.    Invalid input(s): \"CADAM\", \"EVPCR\", \"ENTPCR\", \"ENTEROVIRUS\"    Medication levels    Recent Labs   Lab Test 03/11/25  1112 03/10/25  0614 01/06/25  2306 09/04/24  1008 08/29/19  0544 04/17/18  0942 10/23/17  1025   VANCOMYCIN 19.4   < >  --   --   --   --   --    VCON  --   --   --   --  2.2   < >  --    CYCLSP  --   --   --   --   --   --  Canceled, Test credited   RAPAMY  --   --  4.5*   < > 9.8   < >  --     < > = values in this interval not displayed.       Body fluid stats  No lab results found.    Microbiology:  Fungal testing  Recent Labs   Lab Test 01/20/25  1635 01/13/25  1425 01/13/25  1410   RODGER  --  Not Detected  --    FGTL 37  --   --    FGTLI Negative  --   --    COFUNG  --   --  <1:2   ASPA  --   --  <1:8   HISTOMYCF  --   --  <1:8   HISTOYEACF  --   --  <1:8   FUNBL  --   --  0.6       Last Culture results   Rapid Strep A Screen   Date Value Ref Range Status   10/05/2005   Final    NEGATIVE: No Group A streptococcal antigen detected by immunoassay, await     Comment:      culture report.  Critical Value " called to and read back by Theodora in Transplant clinic 13:20pm   10/5/05 ([facility])     Culture   Date Value Ref Range Status   03/09/2025 No growth after 3 days  Preliminary   03/09/2025 No growth after 3 days  Preliminary   03/08/2025 No growth after 4 days  Preliminary   03/08/2025 >100,000 CFU/mL Klebsiella pneumoniae (A)  Final   03/08/2025 Positive on the 1st day of incubation (A)  Final   03/08/2025 Enterococcus faecalis (AA)  Final     Comment:     2 of 2 bottles   02/07/2025 10,000-50,000 CFU/mL Mixture of Urogenital Louann  Final   01/22/2025 10,000-50,000 CFU/mL Mixture of urogenital louann  Final   01/21/2025 >100,000 CFU/mL Mixture of Urogenital Louann  Final   01/20/2025 No Growth  Final   01/20/2025 No Growth  Final   01/20/2025 No Growth  Final   01/19/2025 <10,000 CFU/mL Mixture of Urogenital Louann  Final   01/19/2025 No Growth  Final   01/19/2025 Positive on the 1st day of incubation (A)  Final   01/19/2025 Staphylococcus epidermidis (AA)  Final     Comment:     1 of 2 bottles  The recovery of this organism from a single blood culture bottle most likely represents contamination. If susceptibility testing is needed, please refer to the antibiogram or contact IDDL. If contamination is suspected, it is important to repeat two sets of blood cultures from two different sites.   01/10/2025 <10,000 CFU/mL Urogenital louann  Final   01/07/2025 No Growth  Final   01/06/2025 No Growth  Final   01/03/2025 No Growth  Final     Culture Micro   Date Value Ref Range Status   08/30/2019 No growth  Final   08/29/2019 No growth  Final   08/28/2019 No growth  Final   08/28/2019 No growth  Final   08/27/2019 10,000 to 50,000 colonies/mL  Escherichia coli   (A)  Final   08/27/2019 (A)  Final    Cultured on the 1st day of incubation:  Escherichia coli  Susceptibility testing done on previous specimen     08/27/2019   Final    Critical Value/Significant Value, preliminary result only, called to and read back by   Maria Teresa Martines RN  "08/27/2019 @1425 dk/hdp     08/26/2019 (A)  Final    Cultured on the 1st day of incubation:  Escherichia coli     08/26/2019   Final    Critical Value/Significant Value, preliminary result only, called to and read back by  [facility] at 1229 8.27.19.     08/26/2019   Final    (Note)  POSITIVE for E.COLI by Verigene multiplex nucleic acid test. Final  identification and antimicrobial susceptibility testing will be  verified by standard methods. Verigene test will not distinguish  E.coli from Shigella species including S.dysenteriae, S.flexneri,  S.boydii, and S.sonnei. Specimens containing Shigella species or  E.coli will be reported as Positive for E.coli.    Specimen tested with Verigene multiplex, gram-negative blood culture  nucleic acid test for the following targets: Acinetobacter sp.,  Citrobacter sp., Enterobacter sp., Proteus sp., E. coli, K.  pneumoniae/oxytoca, P. aeruginosa, and the following resistance  markers: CTXM, KPC, NDM, VIM, IMP and OXA.    Critical Value/Significant Value called to and read back by  Flower Lujan Rn @ 1449 8.27.19          Escherichia coli   Date Value Ref Range Status   09/22/2024 Not Detected Not Detected Final     Enterococcus faecium   Date Value Ref Range Status   03/08/2025 Not Detected Not Detected Final           Last checks of Clostridioides difficile testing  No lab results found.    Infection Studies to assess Diarrhea No lab results found.    Invalid input(s): \"BIDYD\"    Virology:  Coronavirus-19 testing    Recent Labs   Lab Test 03/08/25  1549 01/19/25  1908 01/06/25  2309 01/03/25  1746   [id number] Negative Negative Negative Negative       Respiratory virus (non-coronavirus-19) testing    Recent Labs   Lab Test 03/08/25  1549   IFLUA Not Detected   INFZA Negative   FLUAH1 Not Detected   [id number] Not Detected   FLUAH3 Not Detected   IFLUB Not Detected   INFZB Negative   PIV1 Not Detected   PIV2 Not Detected   PIV3 Not Detected   PIV4 Not Detected   IRSV " "Negative   RSVA Not Detected   RSVB Not Detected   HMPV Not Detected   RHINEV Not Detected   ADENOV Not Detected   MOHR Not Detected       Viral loads    Recent Labs   Lab Test 01/20/25  1635 08/28/19  0528 07/30/18  0856   EBQI Not Detected  --   --    EBRES  --  2,386*  --    CMVQNT Not Detected  --  CMV DNA Not Detected   CMVLOG  --   --  Not Calculated       CMV resistance testing  No lab results found.    No results found for: \"H6RES\"    BK Virus Testing   No lab results found.    Parvovirus Testing    Recent Labs   Lab Test 01/20/25  1635   PRVG 1.82*   PRVM 0.16   PRVPC Not Detected       Adenovirus Testing  No lab results found.    Invalid input(s): \"ADENAB\", \"ADENOVIRUS\", \"ADQT\"    Imaging:  No results found for this or any previous visit (from the past 48 hours).    Pertinent imaging:  US Liver Transplant Follow Up (03/10/2025 2:25 PM)   Impression:   1.  Patent Doppler ultrasound of the transplant liver. The left portal  vein and left hepatic artery were not visualized.  2.  Unremarkable grayscale evaluation of the transplant liver. The  common bile duct was not visualized.  3.  Echogenic right renal parenchyma suggestive of medical renal  disease.  "

## 2025-03-13 NOTE — PLAN OF CARE
Goal Outcome Evaluation:      Plan of Care Reviewed With: patient    Overall Patient Progress: no changeOverall Patient Progress: no change    Outcome Evaluation: Pt A/Ox4, VSS on RA, used 1L NC ON. Taele NSR. Pt up with A1+C to toilet/commode. x2 formed BM's on shift. Voided x1 in toilet. Contact ISO for ESBL in the urine. R midline in place for ongoing abx therapy. Pt POC per ID was to discharge to home on IV invanz thru 3/28 and PO linezolid thru 3/22 with ID f/u on 4/25. Due to inability to tolerate PO linezolid, new abx plan may be necessitated. Anticipate discharge to home with HI when med ready

## 2025-03-13 NOTE — DISCHARGE SUMMARY
"[facility]  Hospitalist Discharge Summary      Date of Admission:  3/8/2025  Date of Discharge:  {DISCHARGE DATE:297954}  Discharging Provider: Karin Cast PA-C  Discharge Service: Hospitalist Service, GOLD TEAM 1    Discharge Diagnoses   ***    Clinically Significant Risk Factors     # DMII: A1C = N/A within past 6 months  # Overweight: Estimated body mass index is 26.23 kg/m  as calculated from the following:    Height as of 2/14/25: 1.715 m (5' 7.5\").    Weight as of 2/12/25: 77.1 kg (170 lb).       Follow-ups Needed After Discharge   Follow-up Appointments       Hospital Follow-up with Existing Primary Care Provider (PCP)      Please see details below         Schedule Primary Care visit within: 30 Days       Follow Up ([facility])      Follow-up as planned with:  Dr Gutierrez of urology on 3/19/2025  Dr May of dermatology in 4/20/2025  Dr Otero of nephrology in 4/2/2025  Primary care provider Dr. Hidlago 4/10/25  Dr. Ramirez/17/25  Dr. Lundberg of Infectious disease clinic 4/25/25    Appointments on [place] and/or [facility] (with [facility] or [facility] provider or service). Call [phone] if you haven't heard regarding these appointments within 7 days of discharge.              Unresulted Labs Ordered in the Past 30 Days of this Admission       Date and Time Order Name Status Description    3/9/2025 12:21 PM Blood Culture Hand, Left Preliminary     3/9/2025 12:21 PM Blood Culture Hand, Right Preliminary     3/8/2025  3:43 PM Blood Culture Hand, Right Preliminary         These results will be followed up by our team    Discharge Disposition   {Discharge to:7111312::\"Discharged to home\"}  Condition at discharge: {CONDITION:667869::\"Stable\"}    Hospital Course   {OPTIONAL -- use to select A&P section   :222212}    Consultations This Hospital Stay   PHARMACY TO DOSE VANCO  INFECTIOUS DISEASE GENERAL ADULT IP CONSULT  INFECTIOUS DISEASE TRANSPLANT SOT ADULT IP CONSULT  GI " HEPATOLOGY ADULT IP CONSULT  GI HEPATOLOGY ADULT IP CONSULT  GI HEPATOLOGY ADULT IP CONSULT  CARE MANAGEMENT / SOCIAL WORK IP CONSULT  PHYSICAL THERAPY ADULT IP CONSULT  OCCUPATIONAL THERAPY ADULT IP CONSULT  NUTRITION SERVICES ADULT IP CONSULT  PHARMACY TO DOSE VANCO  CARE MANAGEMENT / SOCIAL WORK IP CONSULT  VASCULAR ACCESS ADULT IP CONSULT  NURSING TO CONSULT FOR VIRTUAL CARE IP    Code Status   Full Code    Time Spent on this Encounter   {How much time did you spend on discharge?:18910468}       [facility] MED SURG  [address]  [place] [address]  Phone: [phone]  ______________________________________________________________________    Physical Exam   Vital Signs: Temp: 98.5  F (36.9  C) Temp src: Axillary BP: 126/53 Pulse: 66   Resp: 16 SpO2: 100 % O2 Device: Nasal cannula Oxygen Delivery: 1 LPM  Weight: 0 lbs 0 oz  {Recommend personal SmartPhrase or Notewriter for exam (OPTIONAL)   :466524}       Primary Care Physician   Daniel Hidalgo    Discharge Orders      Primary Care - Care Coordination Referral      Home Infusion Referral      OPAT enrollment and ID Clinic Referral      Reason for your hospital stay    Blood and urine infections     Activity    Your activity upon discharge: activity as tolerated and no driving for today     Tubes and Drains    Current Tubes and Drains:    Midline IV-until antibiotics are completed per IV     Follow Up ([facility])    Follow-up as planned with:  Dr Gutierrez of urology on 3/19/2025  Dr May of dermatology in 4/20/2025  Dr Otero of nephrology in 4/2/2025  Primary care provider Dr. Hidalgo 4/10/25  Dr. Ramirez/17/25  Dr. Lundberg of Infectious disease clinic 4/25/25    Appointments on [place] and/or [facility] (with [facility] or [facility] provider or service). Call [phone] if you haven't heard regarding these appointments within 7 days of discharge.     Monitor and record    Blood glucose: As per your home regimen and as needed if  concerning symptoms.  Blood pressure: daily.  Fluid intake and output daily:  Drink plenty of fluids and ensure that you are urinating 4 or more times a day.  Weight: every day.  Check your temperature if you have fever  Check your pulse if you are not experiencing any shortness of breath, dizziness, lightheadedness, chest pain.     When to contact your care team    Call your primary doctor to the ER if you have any of the following: temperature greater than 100.4,  increased shortness of breath, inability to keep food or fluids down, inability to urinate or pass stools, increased swelling, increased pain, or any new or concerning symptoms.     IV access    You are going home with the following vascular access device: midline.     Diet    Follow this diet upon discharge: Regular     Hospital Follow-up with Existing Primary Care Provider (PCP)    Please see details below            Significant Results and Procedures   {Data for Discharge Summary:126559}    Discharge Medications   Current Discharge Medication List        START taking these medications    Details   acetaminophen (TYLENOL) 325 MG tablet Take 2 tablets (650 mg) by mouth every 4 hours as needed for mild pain or other (and adjunct with moderate or severe pain or per patient request).    Associated Diagnoses: Sepsis secondary to UTI (H)      diphenhydrAMINE (BENADRYL) 12.5 MG/5ML elixir Take 20 mLs (50 mg) by mouth every 6 hours as needed for allergies (if reaction after linezolid).    Associated Diagnoses: Sepsis secondary to UTI (H)      ertapenem (INVANZ) 1 GM vial Inject 1 g over 30 minutes into the vein every 24 hours for 18 days.    Associated Diagnoses: Sepsis secondary to UTI (H); Bacteremia due to Enterococcus; Pyelonephritis, acute      linezolid (ZYVOX) 600 MG tablet Take 1 tablet (600 mg) by mouth every 12 hours for 11 days.  Qty: 22 tablet, Refills: 0    Associated Diagnoses: Sepsis secondary to UTI (H); Bladder infection, chronic; Abnormal  liver function tests; Pyelonephritis, acute      senna-docusate (SENOKOT-S/PERICOLACE) 8.6-50 MG tablet Take 1 tablet by mouth 2 times daily as needed for constipation.  Qty: 15 tablet, Refills: 0    Associated Diagnoses: Sepsis secondary to UTI (H); Generalized abdominal pain           CONTINUE these medications which have CHANGED    Details   predniSONE (DELTASONE) 5 MG tablet Take 1 tablet (5 mg) by mouth daily.  Qty: 30 tablet, Refills: 0    Associated Diagnoses: Immunosuppressed status; H/O liver transplant (H)           CONTINUE these medications which have NOT CHANGED    Details   apixaban ANTICOAGULANT (ELIQUIS) 5 MG tablet Take 1 tablet (5 mg) by mouth 2 times daily.  Qty: 180 tablet, Refills: 1    Comments: NEW TABLET STRENGTH, PATIENT AWARE (OK to use up current supply of 2.5 mg tablets by taking 2 tablets in the morning and 2 tablets in the evening).  Associated Diagnoses: PAF (paroxysmal atrial fibrillation) (H)      calcitRIOL (ROCALTROL) 0.25 MCG capsule Take 1 capsule (0.25 mcg) by mouth daily.  Qty: 90 capsule, Refills: 1    Associated Diagnoses: Papillary thyroid carcinoma (H)      Continuous Glucose Sensor (FREESTYLE NINO 2 SENSOR) MISC Change every 14 days.  Qty: 2 each, Refills: 5    Comments: Medicare B  Associated Diagnoses: Postoperative hypothyroidism; Papillary thyroid carcinoma (H); Type 2 diabetes mellitus with stage 3 chronic kidney disease, without long-term current use of insulin (H)      D-MANNOSE PO Take 1 Scoop by mouth 2 times daily.      EPINEPHrine (ANY BX GENERIC EQUIV) 0.3 MG/0.3ML injection 2-pack Inject 0.3 mLs (0.3 mg) into the muscle as needed for anaphylaxis. May repeat one time in 5-15 minutes if response to initial dose is inadequate.  Qty: 2 each, Refills: 1    Associated Diagnoses: Anaphylaxis, sequela      estradiol (ESTRACE) 0.1 MG/GM vaginal cream Place vaginally every other day.      evolocumab (REPATHA SURECLICK) 140 MG/ML prefilled autoinjector Inject 1 mL (140  mg) subcutaneously every 14 days. . Please have fasting labs drawn for further refills.  Qty: 6 mL, Refills: 3    Associated Diagnoses: Hyperlipidemia LDL goal <70; Statin intolerance; HDL deficiency; Type 2 diabetes mellitus with stage 3 chronic kidney disease, without long-term current use of insulin (H); Hypertriglyceridemia; H/O: CVA (cerebrovascular accident)      ezetimibe (ZETIA) 10 MG tablet Take 1 tablet (10 mg) by mouth daily.  Qty: 90 tablet, Refills: 3    Associated Diagnoses: Benign essential hypertension; Hyperlipidemia LDL goal <70      Ferrous Sulfate 324 MG TBEC Take 1 tablet by mouth 2 times daily as needed.      folic acid (FOLVITE) 1 MG tablet TAKE 1 TABLET BY MOUTH DAILY  Qty: 90 tablet, Refills: 3    Comments: Please send a replace/new response with 90-Day Supply if appropriate to maximize member benefit. Requesting 1 year supply.  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    Associated Diagnoses: Benign essential hypertension      ketoconazole (NIZORAL) 2 % external cream Apply topically 2 times daily as needed (redness and flaking). Apply to the face.  Qty: 30 g, Refills: 3    Associated Diagnoses: Seborrheic dermatitis      ketoconazole (NIZORAL) 2 % external shampoo Apply topically three times a week. Lather in the shower, wait 3-5 minutes before rinsing.  Qty: 100 mL, Refills: 11    Associated Diagnoses: Seborrheic dermatitis      levothyroxine  (SYNTHROID/LEVOTHROID) 175 MCG tablet Take 1 tablet (175 mcg) by mouth daily.  Qty: 90 tablet, Refills: 1    Associated Diagnoses: Postoperative hypothyroidism      linagliptin (TRADJENTA) 5 MG TABS tablet Take 1 tablet (5 mg) by mouth daily.  Qty: 90 tablet, Refills: 1    Associated Diagnoses: Postoperative hypothyroidism; Papillary thyroid carcinoma (H); Type 2 diabetes mellitus with stage 3 chronic kidney disease, without long-term current use of insulin (H)      meclizine (ANTIVERT) 25 MG tablet Take 1 tablet (25 mg) by mouth as needed for dizziness or other (migraines)  Qty: 30 tablet, Refills: 11    Associated Diagnoses: Dizziness      metoprolol succinate ER (TOPROL XL) 25 MG 24 hr tablet Take 1 tablet (25 mg) by mouth daily. Take an extra tablet daily as needed for breakthrough afib. May repeat in two hours if heart rate remains >100bpm (no more than 4 tablets per day).    Associated Diagnoses: Paroxysmal atrial fibrillation (H)      Multiple Vitamins-Minerals (PRESERVISION AREDS 2) CAPS Take 1 capsule by mouth 2 times daily    Associated Diagnoses: Liver replaced by transplant (H)      Nutrisource Fiber PO packet Take 1 packet by mouth daily.      omega-3 acid ethyl esters (LOVAZA) 1 g capsule Take 1 capsule by mouth 2 times daily.  Qty: 180 capsule, Refills: 1    Associated Diagnoses: Hypertriglyceridemia      sirolimus (GENERIC EQUIVALENT) 1 MG tablet Take 1 tablet (1 mg) by mouth daily.  Qty: 90 tablet, Refills: 3    Comments: TXP DT 5/22/2002 (Liver) TXP Dischg DT 6/3/2002 DX Liver replaced by transplant Z94.4 TX [facility] ([place], [place])  Associated Diagnoses: Liver replaced by transplant (H)      ursodiol (ACTIGALL) 250 MG tablet Take 1 tablet (250 mg) by mouth 2 times daily.  Qty: 180 tablet, Refills: 3    Associated Diagnoses: Liver replaced by transplant (H)           STOP taking these medications       amLODIPine (NORVASC) 5 MG tablet Comments:    Reason for Stopping:         hypromellose (ARTIFICIAL TEARS) 0.4 % SOLN ophthalmic solution Comments:   Reason for Stopping:             Allergies   Allergies   Allergen Reactions    Fluconazole Hives and Itching     Full body hives  **Intradermal skin testing negative**  [See intradermal skin testing results from 8/2/2019]    Mycophenolate Diarrhea and Nausea and Vomiting     Patient stated it was chronic and lasted months      Penicillins Anaphylaxis, Hives, Itching and Rash     **Intradermal skin testing negative**  [See intradermal skin testing results from 8/2/2019]      Simvastatin Muscle Pain (Myalgia)     severe  Other reaction(s): Myalgia caused by statin    Methotrexate Other (See Comments)     Other reaction(s): Sore  Sores in mouth, esophagus, and stomach.       Morphine And Codeine Itching and Other (See Comments)     Psych disturbance  Other reaction(s): Confusion, Mood alteration    Quinolones Anxiety, Dizziness, Headache, Other (See Comments), Palpitations and Unknown     Other reaction(s): Hyperactive behavior, Lightheadedness, Mood alteration    Dizzy, light headed    Dizziness, shaky, and jumpy    Benadryl [Diphenhydramine Hcl]      Insomnia     Capsules, Empty Gelatin [Gelatin]     Lansoprazole Diarrhea    Azithromycin Itching     [See intradermal skin testing results from 8/2/2019]    Bactrim [Sulfamethoxazole-Trimethoprim] Other (See Comments)     Numb mouth, tingling lips (treated with anti-histamines)    Cephalosporins Itching     [See intradermal skin testing results from 8/2/2019]    Ciprofloxacin Hcl Other (See Comments) and Dizziness     Insomnia, mood lability, Irregular heart beat         Doxycycline Itching and Unknown     [See intradermal skin testing results from 8/2/2019]    Lisinopril Cough    Omeprazole Itching    Tolectin [Nsaids] Rash    Tolmetin Rash and Itching    Tramadol Rash, Hives and Itching      "recommended are MRI/MRCP and MDCT  Size of largest cyst:   *  Less than 20 mm: Follow-up imaging in 6 months once, then every 18  months if no change. Stop surveillance if stable for 5 years.  *  More than 20 mm but less than 30 mm: Follow-up imaging at 6 months,  12 months, then yearly if no change.   *  More than 30 mm- follow up imaging every 6 months.    GI consultation for surgery/endoscopic ultrasound is recommended for  cysts with \"high-risk stigmata\" of high grade dysplasia or invasive  carcinoma such as:  1. Obstructive jaundice in a patient with cystic lesion of the head of  the pancreas  2. Enhancing mural nodule > 5mm or solid component  3. Main pancreatic duct >10mm  4. Suspicious or positive results of cytology    If worrisome features are present, GI consultation is recommended.  Worrisome features on imagin. Cyst more than or equal to 30 mm  2. Thickened or enhancing cyst walls  3. Main pancreatic duct > 5mm and < 10mm.  4. Abrupt change in caliber of pancreatic duct with distal atrophy  5. Lymphadenopathy  6. Cyst growth rate > 2.5mm/year    *Reference: International evidence-based Kyoto guidelines for the  management of  intraductal papillary mucinous neoplasm of the pancreas Pancreatology:  24:(); 255-270.  https://doi.org/10.1016/j.mccracken.2023.12.009    I have personally reviewed the examination and initial interpretation  and I agree with the findings.    ROSENDO HIRSCH MD         SYSTEM ID:  [id number]    Liver Transplant Follow Up    Narrative    EXAMINATION: US LIVER TRANSPLANT FOLLOW UP, 3/10/2025 2:25 PM     COMPARISON: 2019, 2016    HISTORY: Follow up on liver transplant in the setting of infection and  c/f obstruction/infection source and assess flow.     TECHNIQUE:  Gray-scale, color Doppler and spectral flow analysis.    FINDINGS:   There is no ascites.    Liver:   The liver demonstrates normal homogeneous echotexture. No  evidence of a focal hepatic mass.     Bile " Ducts: Intrahepatic biliary system is of normal caliber.  The  common bile duct is not visualized.     Gallbladder: The gallbladder is surgically absent.    Right kidney:  The right kidney demonstrates echogenic renal  parenchyma with no evidence of a shadowing stone, focal mass or  hydronephrosis.   11.0 cm in long axis dimension.    Pancreas: The pancreas is not visualized.    Visualized portions of the aorta are obscured.    LIVER DOPPLER:  Splenic vein:  Patent continuous normal antegrade direction flow  towards the liver, 21 cm/sec.  Extrahepatic portal vein:  Patent continuous antegrade flow, 21  cm/sec.  Portal vein at anastomosis: Patent continuous antegrade flow, 25  cm/sec.  Intrahepatic portal vein:  Patent continuous antegrade flow, 26  cm/sec.  Right portal vein flow is antegrade, measuring 21 cm/sec.  Left portal vein is not visualized.     Inferior vena cava: patent with flow toward the heart throughout..  IVC above anastomosis:  98 cm/sec.  IVC at anastomosis:  45 cm/sec.  Intrahepatic IVC:  39 cm/sec.  Extrahepatic IVC:  40 cm/sec.    Right, mid, left hepatic veins: Patent with flow towards the inferior  vena cava.    Extrahepatic hepatic artery: Low resistance waveform with flow towards  the liver. 56/13 cm/sec with resistive index 0.77.  Right hepatic artery: 57/12 cm/sec with resistive index 0.79.  Left hepatic artery is not visualized.      Impression    Impression:   1.  Patent Doppler ultrasound of the transplant liver. The left portal  vein and left hepatic artery were not visualized.  2.  Unremarkable grayscale evaluation of the transplant liver. The  common bile duct was not visualized.  3.  Echogenic right renal parenchyma suggestive of medical renal  disease.    I have personally reviewed the examination and initial interpretation  and I agree with the findings.    KAYLIE BLAS MD         SYSTEM ID:  [id number]   Echocardiogram Complete     Value    LVEF  60-65%    Narrative     [id number]  [id number]  [id number]  [id number]^MARY JO^AVERY^MATILDE     [facility],[place]  Echocardiography Laboratory  [address] [place] [address]     Name: ISA CAMARGO  MRN: [id number]  : 1950  Study Date: 03/10/2025 09:11 AM  Age: 74 yrs  Gender: Female  Patient Location: [facility]  Reason For Study: Endocarditis  Ordering Physician: AVERY CAMARGO  Performed By: Renetta Melara     BSA: 1.9 m2  Height: 67 in  Weight: 170 lb  HR: 70  BP: 147/79 mmHg  ______________________________________________________________________________  Procedure  Echocardiogram with two-dimensional, color and spectral Doppler. Poor acoustic  windows.  ______________________________________________________________________________  Interpretation Summary  No vegetation or mass identified. Global and regional left ventricular  function is normal with an EF of 60-65%.  Right ventricular function, chamber size, wall motion, and thickness are  normal.  The inferior vena cava is normal.  No pericardial effusion is present.  ______________________________________________________________________________  Left Ventricle  Global and regional left ventricular function is normal with an EF of 60-65%.  Left ventricular wall thickness is normal. Left ventricular size is normal.  Diastolic function not assessed due to significant mitral annular  calcification. No regional wall motion abnormalities are seen.     Right Ventricle  Right ventricular function, chamber size, wall motion, and thickness are  normal.     Atria  Both atria appear normal.     Mitral Valve  Moderate mitral annular calcification is present. Trace to mild mitral  insufficiency is present.     Aortic Valve  Aortic valve sclerosis is present. Trace to mild aortic insufficiency is  present.     Tricuspid Valve  The tricuspid valve is normal. Mild tricuspid insufficiency is present. The  right ventricular systolic pressure is approximated at  11.8 mmHg plus the  right atrial pressure. Pulmonary artery systolic pressure is normal.     Pulmonic Valve  The pulmonic valve is normal.     Vessels  The thoracic aorta cannot be assessed. The pulmonary artery cannot be  assessed. The inferior vena cava is normal.     Pericardium  No pericardial effusion is present.     ______________________________________________________________________________  MMode/2D Measurements & Calculations  LVOT diam: 1.9 cm  LVOT area: 2.7 cm2     Doppler Measurements & Calculations  Ao V2 max: 153.4 cm/sec  Ao max P.4 mmHg  Ao V2 mean: 101.0 cm/sec  Ao mean P.6 mmHg  Ao V2 VTI: 34.9 cm  MAYUR(I,D): 1.6 cm2  MAYUR(V,D): 1.7 cm2  LV V1 max PG: 3.8 mmHg  LV V1 max: 97.8 cm/sec  LV V1 VTI: 20.7 cm  SV(LVOT): 55.7 ml  SI(LVOT): 29.5 ml/m2  TR max nate: 171.6 cm/sec  TR max P.8 mmHg  AV Nate Ratio (DI): 0.64  MAYUR Index (cm2/m2): 0.85     ______________________________________________________________________________  Report approved by: JAY Fishman MD on 03/10/2025 10:14 AM           *Note: Due to a large number of results and/or encounters for the requested time period, some results have not been displayed. A complete set of results can be found in Results Review.       Discharge Medications   Current Discharge Medication List        START taking these medications    Details   acetaminophen (TYLENOL) 325 MG tablet Take 2 tablets (650 mg) by mouth every 4 hours as needed for mild pain or other (and adjunct with moderate or severe pain or per patient request).    Associated Diagnoses: Sepsis secondary to UTI (H)      diphenhydrAMINE (BENADRYL) 12.5 MG/5ML elixir Take 20 mLs (50 mg) by mouth every 6 hours as needed for allergies (if reaction after linezolid).    Associated Diagnoses: Sepsis secondary to UTI (H)      ertapenem (INVANZ) 1 GM vial Inject 1 g over 30 minutes into the vein every 24 hours for 18 days.    Associated Diagnoses: Sepsis secondary to UTI (H); Bacteremia due to  Enterococcus; Pyelonephritis, acute      linezolid (ZYVOX) 600 MG tablet Take 1 tablet (600 mg) by mouth every 12 hours for 11 days.  Qty: 22 tablet, Refills: 0    Associated Diagnoses: Sepsis secondary to UTI (H); Bladder infection, chronic; Abnormal liver function tests; Pyelonephritis, acute      senna-docusate (SENOKOT-S/PERICOLACE) 8.6-50 MG tablet Take 1 tablet by mouth 2 times daily as needed for constipation.  Qty: 15 tablet, Refills: 0    Associated Diagnoses: Sepsis secondary to UTI (H); Generalized abdominal pain           CONTINUE these medications which have CHANGED    Details   predniSONE (DELTASONE) 5 MG tablet Take 1 tablet (5 mg) by mouth daily.  Qty: 30 tablet, Refills: 0    Associated Diagnoses: Immunosuppressed status; H/O liver transplant (H)           CONTINUE these medications which have NOT CHANGED    Details   apixaban ANTICOAGULANT (ELIQUIS) 5 MG tablet Take 1 tablet (5 mg) by mouth 2 times daily.  Qty: 180 tablet, Refills: 1    Comments: NEW TABLET STRENGTH, PATIENT AWARE (OK to use up current supply of 2.5 mg tablets by taking 2 tablets in the morning and 2 tablets in the evening).  Associated Diagnoses: PAF (paroxysmal atrial fibrillation) (H)      calcitRIOL (ROCALTROL) 0.25 MCG capsule Take 1 capsule (0.25 mcg) by mouth daily.  Qty: 90 capsule, Refills: 1    Associated Diagnoses: Papillary thyroid carcinoma (H)      Continuous Glucose Sensor (FREESTYLE NINO 2 SENSOR) MISC Change every 14 days.  Qty: 2 each, Refills: 5    Comments: Medicare B  Associated Diagnoses: Postoperative hypothyroidism; Papillary thyroid carcinoma (H); Type 2 diabetes mellitus with stage 3 chronic kidney disease, without long-term current use of insulin (H)      D-MANNOSE PO Take 1 Scoop by mouth 2 times daily.      EPINEPHrine (ANY BX GENERIC EQUIV) 0.3 MG/0.3ML injection 2-pack Inject 0.3 mLs (0.3 mg) into the muscle as needed for anaphylaxis. May repeat one time in 5-15 minutes if response to initial dose is  inadequate.  Qty: 2 each, Refills: 1    Associated Diagnoses: Anaphylaxis, sequela      estradiol (ESTRACE) 0.1 MG/GM vaginal cream Place vaginally every other day.      evolocumab (REPATHA SURECLICK) 140 MG/ML prefilled autoinjector Inject 1 mL (140 mg) subcutaneously every 14 days. . Please have fasting labs drawn for further refills.  Qty: 6 mL, Refills: 3    Associated Diagnoses: Hyperlipidemia LDL goal <70; Statin intolerance; HDL deficiency; Type 2 diabetes mellitus with stage 3 chronic kidney disease, without long-term current use of insulin (H); Hypertriglyceridemia; H/O: CVA (cerebrovascular accident)      ezetimibe (ZETIA) 10 MG tablet Take 1 tablet (10 mg) by mouth daily.  Qty: 90 tablet, Refills: 3    Associated Diagnoses: Benign essential hypertension; Hyperlipidemia LDL goal <70      Ferrous Sulfate 324 MG TBEC Take 1 tablet by mouth 2 times daily as needed.      folic acid (FOLVITE) 1 MG tablet TAKE 1 TABLET BY MOUTH DAILY  Qty: 90 tablet, Refills: 3    Comments: Please send a replace/new response with 90-Day Supply if appropriate to maximize member benefit. Requesting 1 year supply.  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    Associated Diagnoses: Benign essential hypertension      ketoconazole (NIZORAL) 2 % external cream Apply topically 2 times daily as needed (redness and flaking). Apply to the face.  Qty: 30 g, Refills: 3    Associated Diagnoses: Seborrheic  dermatitis      ketoconazole (NIZORAL) 2 % external shampoo Apply topically three times a week. Lather in the shower, wait 3-5 minutes before rinsing.  Qty: 100 mL, Refills: 11    Associated Diagnoses: Seborrheic dermatitis      levothyroxine (SYNTHROID/LEVOTHROID) 175 MCG tablet Take 1 tablet (175 mcg) by mouth daily.  Qty: 90 tablet, Refills: 1    Associated Diagnoses: Postoperative hypothyroidism      linagliptin (TRADJENTA) 5 MG TABS tablet Take 1 tablet (5 mg) by mouth daily.  Qty: 90 tablet, Refills: 1    Associated Diagnoses: Postoperative hypothyroidism; Papillary thyroid carcinoma (H); Type 2 diabetes mellitus with stage 3 chronic kidney disease, without long-term current use of insulin (H)      meclizine (ANTIVERT) 25 MG tablet Take 1 tablet (25 mg) by mouth as needed for dizziness or other (migraines)  Qty: 30 tablet, Refills: 11    Associated Diagnoses: Dizziness      metoprolol succinate ER (TOPROL XL) 25 MG 24 hr tablet Take 1 tablet (25 mg) by mouth daily. Take an extra tablet daily as needed for breakthrough afib. May repeat in two hours if heart rate remains >100bpm (no more than 4 tablets per day).    Associated Diagnoses: Paroxysmal atrial fibrillation (H)      Multiple Vitamins-Minerals (PRESERVISION AREDS 2) CAPS Take 1 capsule by mouth 2 times daily    Associated Diagnoses: Liver replaced by transplant (H)      Nutrisource Fiber PO packet Take 1 packet by mouth daily.      omega-3 acid ethyl esters (LOVAZA) 1 g capsule Take 1 capsule by mouth 2 times daily.  Qty: 180 capsule, Refills: 1    Associated Diagnoses: Hypertriglyceridemia      sirolimus (GENERIC EQUIVALENT) 1 MG tablet Take 1 tablet (1 mg) by mouth daily.  Qty: 90 tablet, Refills: 3    Comments: TXP DT 5/22/2002 (Liver) TXP Dischg DT 6/3/2002 DX Liver replaced by transplant Z94.4 TX [facility] ([place], [place])  Associated Diagnoses: Liver replaced by transplant (H)      ursodiol (ACTIGALL)  250 MG tablet Take 1 tablet (250 mg) by mouth 2 times daily.  Qty: 180 tablet, Refills: 3    Associated Diagnoses: Liver replaced by transplant (H)           STOP taking these medications       amLODIPine (NORVASC) 5 MG tablet Comments:   Reason for Stopping:         hypromellose (ARTIFICIAL TEARS) 0.4 % SOLN ophthalmic solution Comments:   Reason for Stopping:             Allergies   Allergies   Allergen Reactions    Fluconazole Hives and Itching     Full body hives  **Intradermal skin testing negative**  [See intradermal skin testing results from 8/2/2019]    Mycophenolate Diarrhea and Nausea and Vomiting     Patient stated it was chronic and lasted months      Penicillins Anaphylaxis, Hives, Itching and Rash     **Intradermal skin testing negative**  [See intradermal skin testing results from 8/2/2019]      Simvastatin Muscle Pain (Myalgia)     severe  Other reaction(s): Myalgia caused by statin    Methotrexate Other (See Comments)     Other reaction(s): Sore  Sores in mouth, esophagus, and stomach.       Morphine And Codeine Itching and Other (See Comments)     Psych disturbance  Other reaction(s): Confusion, Mood alteration    Quinolones Anxiety, Dizziness, Headache, Other (See Comments), Palpitations and Unknown     Other reaction(s): Hyperactive behavior, Lightheadedness, Mood alteration    Dizzy, light headed    Dizziness, shaky, and jumpy    Benadryl [Diphenhydramine Hcl]      Insomnia     Capsules, Empty Gelatin [Gelatin]     Lansoprazole Diarrhea    Azithromycin Itching     [See intradermal skin testing results from 8/2/2019]    Bactrim [Sulfamethoxazole-Trimethoprim] Other (See Comments)     Numb mouth, tingling lips (treated with anti-histamines)    Cephalosporins Itching     [See intradermal skin testing results from 8/2/2019]    Ciprofloxacin Hcl Other (See Comments) and Dizziness     Insomnia, mood lability, Irregular heart beat         Doxycycline Itching and Unknown     [See intradermal skin  testing results from 8/2/2019]    Lisinopril Cough    Omeprazole Itching    Tolectin [Nsaids] Rash    Tolmetin Rash and Itching    Tramadol Rash, Hives and Itching

## 2025-03-13 NOTE — PLAN OF CARE
Goal Outcome Evaluation:         Patient continues to refuse best practice care after education provided to patient    Care refused: PO linezolid  Education Provided: Yes. Pt offered benadryl to premedicate pt before taking abx since her mouth became numb earlier in the day when she took the med at 14:00. Pt acknowledged the importance of seeing if she could tolerate the antibiotic biut still declined because she feels uncomfortable taking a med that she has a reaction to since she will be discharging to home where she lives alone  Provider notified (name):   Rishabh Fernandez

## 2025-03-13 NOTE — PROGRESS NOTES
[facility]    Medicine Progress Note - Hospitalist Service, GOLD TEAM 1    Date of Admission:  3/8/2025    Assessment & Plan   Luz Thompson is a 74F w/ PMH of primary biliary cirrhosis s/p liver transplant (2002), paroxysmal atrial fibrillation on Eliquis, CVA, CKD, HTN, DM2, PAD, hypothyroidism who was admitted with sepsis.      Sepsis-resolved  Klebsiella pneumoniae UTI  E. faecalis bacteremia (3/8)  Hx of recurrent UTIs  Follows with Urology and ID for recurrent UTIs. Presented with fever of 100.6 DegF, leukocytosis. Urinalysis c/w infection with urine culture now growing >100k colonies Klebsiella pneumoniae, does have a hx of ESBL Klebsiella. 2 of 2 blood cultures growing gram positive cocci. Started meropenem and received 1x dose of vancomycin in ED.   - Transplant ID consult input appreciated   - prn benadryl in case of Abx- induced allergic reaction    - Abx:   - oral linezolid 600mg BID for to complete 14d tx (3/12-)  - ertapenem (3/9-)    - discontinue IV vancomycin (3/9-3/12)   - discontinue meropenem (3/8-3/9)   - Repeat blood cultures x2 (3/8 evening and 3/9)- NGTD   - Follow pending blood and urine cultures   - PT, OT consult input appreciated     Transaminitis  Primary biliary cirrhosis s/p liver transplant (2002)  Labs on admission notable for ALT 61, AST 58 with normal alk phos and Tbili. CT a/p with subtle hypodensity in inferior liver c/f peripheral biliary ductal dilatation or edema.   - Hepatology consult input appreciated, siged off  - No signs of acute cholangitis. But recommend outpatient MRCP to evaluate bile ducts more closely. Pancreatic cyst can be assessed at that time as well, otherwise that will require MRI follow up in 1 year.  - liver US w/ doppler no acute pathology    - IS:               - Prednisone changed from 10mg daily to 5mg daily per GI recs               - Sirolimus 1 mg daily   - Ursodiol 250 mg BID   - Trend hepatic  panel     HTN  HLD  Paroxsymal atrial fibrillation  Blood pressures soft secondary to the above. Bradycardic overnight with HR in 40s, asymptomatic on admission. Remains AVSS. No e/o significant bleeding.    - resumed PTA hydralazine w/ hold parameters  - resume metoprolol w/ parameters   - Continue PTA Eliquis 5 mg BID   - Continue PTA ezetimibe (allergies to statins)   - Telemetry monitoring     DM2  Diabetic neuropathy  A1c 6.9 in December.   Recent Labs   Lab 03/12/25  1457 03/11/25  2129 03/11/25  1912 03/11/25  1617 03/10/25  2319 03/10/25  1703   GLC 94 159* 155* 141* 148* 227*     - glucose checks BID and HS   - Continue PTA linagliptin   - Continue PTA gabapentin   - BG checks BID. No need for sliding scale insulin currently, initiate if BG persistently >180     CKD IIIb: Creatinine stable within baseline range of ~1.4 - 1.6. Cr improved to 1.1 (3/11) in the setting of above - Next scheduled with Nephrology on 4/2/25.      Hypothyroidism: TSH normal (1/20/2025) continue PTA levothyroxine.      SCC of left cheek: Scheduled for MOHS excision with Dr. May on 4/1/25.     Cardiac murmur-documented per patient's chart and TTE as per above without concerning pathology, murmur not appreciated on assessment today    Osteoarthritis  Physical deconditioning-appreciate PT and OT input, patient is appropriate for return to home on discharge            Diet: High Consistent Carb (75 g CHO per Meal) Diet  Snacks/Supplements Adult: Danna Etta Standard Oral Supplement; With Meals  Diet    DVT Prophylaxis: DOAC  Ron Catheter: Not present  Lines: PRESENT             Cardiac Monitoring: None  Code Status: Full Code      Clinically Significant Risk Factors               # Hypoalbuminemia: Lowest albumin = 3 g/dL at 3/10/2025  6:14 AM, will monitor as appropriate     # Hypertension: Noted on problem list           # DMII: A1C = N/A within past 6 months   # Overweight: Estimated body mass index is 26.23 kg/m  as calculated  "from the following:    Height as of 2/14/25: 1.715 m (5' 7.5\").    Weight as of 2/12/25: 77.1 kg (170 lb).      # Financial/Environmental Concerns: none         Social Drivers of Health    Housing Stability: Low Risk  (3/12/2025)    Housing Stability     Do you have housing? : Yes     Are you worried about losing your housing?: No   Recent Concern: Housing Stability - High Risk (1/22/2025)    Housing Stability     Do you have housing? : No     Are you worried about losing your housing?: No   Tobacco Use: Medium Risk (2/12/2025)    Patient History     Smoking Tobacco Use: Former     Smokeless Tobacco Use: Never   Transportation Needs: Low Risk  (3/12/2025)    Transportation Needs     Within the past 12 months, has lack of transportation kept you from medical appointments, getting your medicines, non-medical meetings or appointments, work, or from getting things that you need?: No   Recent Concern: Transportation Needs - High Risk (1/27/2025)    Transportation Needs     Within the past 12 months, has lack of transportation kept you from medical appointments, getting your medicines, non-medical meetings or appointments, work, or from getting things that you need?: Yes   Physical Activity: Insufficiently Active (11/9/2023)    Received from [email or web address]    Exercise Vital Sign     Days of Exercise per Week: 5 days     Minutes of Exercise per Session: 10 min          Disposition Plan     Medically Ready for Discharge: Anticipated Tomorrow           The patient's care was discussed with the Attending Physician, Dr. Aquino, Bedside Nurse, Care Coordinator/, Patient, and ID Consultant(s).    Karin Cast PA-C  Hospitalist Service, [facility] TEAM 1  [place]  Securely message with [employer] (more info)  Text page via [facility] Paging/Directory   See signed in provider for up to date coverage " information  ______________________________________________________________________    Interval History   [place] is doing ok in terms of health today.     In terms of the linezolid, she had instant lip and tongue tingling when she took the dose.  She notes that she received Benadryl as soon as the symptoms started.  She did not experience any other overt allergic symptoms to her awareness including any problems breathing, sore throat, rash think and did not experience nausea or vomiting.  She notes that this was a similar reaction she had when receiving induction of penicillin and states that she was told to avoid penicillins due to having a similar reaction.     Denies N/V, F/C, chest pain, sob, palpitations, sore throat, difficulty breathing, rashes, lumps, lesions, urinary complaints (including dysuria), changes to bowels or other complaints.       Physical Exam   Vital Signs: Temp: 98.5  F (36.9  C) Temp src: Axillary BP: 126/53 Pulse: 66   Resp: 16 SpO2: 100 % O2 Device: Nasal cannula Oxygen Delivery: 1 LPM  Weight: 0 lbs 0 oz  GENERAL: Alert and oriented x 3. NAD.  Well-appearing. Cooperative. Sitting upright unassisted  HEENT: Anicteric sclera.  Pupils equal round NC. AT.   CV: RRR. S1, S2. No murmurs appreciated.   RESPIRATORY: Effort normal on RA.   NEUROLOGICAL: No focal deficits. Moves all extremities.    EXTREMITIES: No peripheral edema. Intact bilateral pedal pulses.   SKIN: No jaundice. No rashes on exposed skin       Medical Decision Making       65 MINUTES SPENT BY ME on the date of service doing chart review, history, exam, documentation & further activities per the note.      Data     I have personally reviewed the following data over the past 24 hrs:    N/A  \   N/A   / N/A     144 109 (H) 33.3 (H) /  105 (H)   4.6 22 1.30 (H) \     Procal: N/A CRP: N/A Lactic Acid: N/A         Imaging results reviewed over the past 24 hrs:   No results found for this or any previous visit (from the past 24  hours).

## 2025-03-14 ENCOUNTER — APPOINTMENT (OUTPATIENT)
Dept: OCCUPATIONAL THERAPY | Facility: CLINIC | Age: 75
DRG: 871 | End: 2025-03-14
Payer: MEDICARE

## 2025-03-14 ENCOUNTER — TELEPHONE (OUTPATIENT)
Dept: INTERNAL MEDICINE | Facility: CLINIC | Age: 75
End: 2025-03-14
Payer: MEDICARE

## 2025-03-14 VITALS
HEART RATE: 72 BPM | TEMPERATURE: 98.5 F | RESPIRATION RATE: 18 BRPM | OXYGEN SATURATION: 95 % | SYSTOLIC BLOOD PRESSURE: 139 MMHG | DIASTOLIC BLOOD PRESSURE: 82 MMHG

## 2025-03-14 LAB
BACTERIA BLD CULT: NO GROWTH
GLUCOSE BLDC GLUCOMTR-MCNC: 105 MG/DL (ref 70–99)
GLUCOSE BLDC GLUCOMTR-MCNC: 89 MG/DL (ref 70–99)

## 2025-03-14 PROCEDURE — 250N000011 HC RX IP 250 OP 636: Performed by: PEDIATRICS

## 2025-03-14 PROCEDURE — 250N000013 HC RX MED GY IP 250 OP 250 PS 637: Performed by: PEDIATRICS

## 2025-03-14 PROCEDURE — 99239 HOSP IP/OBS DSCHRG MGMT >30: CPT | Performed by: PHYSICIAN ASSISTANT

## 2025-03-14 PROCEDURE — 99207 PR APP CREDIT; MD BILLING SHARED VISIT: CPT | Performed by: PEDIATRICS

## 2025-03-14 PROCEDURE — 250N000013 HC RX MED GY IP 250 OP 250 PS 637: Performed by: PHYSICIAN ASSISTANT

## 2025-03-14 PROCEDURE — 250N000012 HC RX MED GY IP 250 OP 636 PS 637: Performed by: STUDENT IN AN ORGANIZED HEALTH CARE EDUCATION/TRAINING PROGRAM

## 2025-03-14 PROCEDURE — 250N000013 HC RX MED GY IP 250 OP 250 PS 637: Performed by: STUDENT IN AN ORGANIZED HEALTH CARE EDUCATION/TRAINING PROGRAM

## 2025-03-14 PROCEDURE — 97530 THERAPEUTIC ACTIVITIES: CPT | Mod: GO

## 2025-03-14 PROCEDURE — 97535 SELF CARE MNGMENT TRAINING: CPT | Mod: GO

## 2025-03-14 PROCEDURE — 250N000011 HC RX IP 250 OP 636: Performed by: PHYSICIAN ASSISTANT

## 2025-03-14 PROCEDURE — 250N000012 HC RX MED GY IP 250 OP 636 PS 637: Performed by: PEDIATRICS

## 2025-03-14 PROCEDURE — 258N000003 HC RX IP 258 OP 636: Performed by: PHYSICIAN ASSISTANT

## 2025-03-14 RX ORDER — ERTAPENEM 1 G/1
1 INJECTION, POWDER, LYOPHILIZED, FOR SOLUTION INTRAMUSCULAR; INTRAVENOUS EVERY 24 HOURS
Status: ACTIVE | DISCHARGE
Start: 2025-03-14 | End: 2025-03-28

## 2025-03-14 RX ORDER — DIPHENHYDRAMINE HCL 12.5MG/5ML
50 LIQUID (ML) ORAL EVERY 6 HOURS PRN
COMMUNITY
Start: 2025-03-14

## 2025-03-14 RX ADMIN — VANCOMYCIN HYDROCHLORIDE 750 MG: 1 INJECTION, POWDER, LYOPHILIZED, FOR SOLUTION INTRAVENOUS at 13:54

## 2025-03-14 RX ADMIN — URSODIOL 250 MG: 250 TABLET ORAL at 09:27

## 2025-03-14 RX ADMIN — HYDRALAZINE HYDROCHLORIDE 25 MG: 25 TABLET ORAL at 13:54

## 2025-03-14 RX ADMIN — OMEGA-3-ACID ETHYL ESTERS 1 G: 1 CAPSULE, LIQUID FILLED ORAL at 09:27

## 2025-03-14 RX ADMIN — METOPROLOL SUCCINATE 25 MG: 25 TABLET, EXTENDED RELEASE ORAL at 09:27

## 2025-03-14 RX ADMIN — HYDRALAZINE HYDROCHLORIDE 25 MG: 25 TABLET ORAL at 09:27

## 2025-03-14 RX ADMIN — LEVOTHYROXINE SODIUM 175 MCG: 0.17 TABLET ORAL at 09:27

## 2025-03-14 RX ADMIN — SIROLIMUS 1 MG: 1 TABLET, FILM COATED ORAL at 09:27

## 2025-03-14 RX ADMIN — LINAGLIPTIN 5 MG: 5 TABLET, FILM COATED ORAL at 09:27

## 2025-03-14 RX ADMIN — CALCITRIOL CAPSULES 0.25 MCG 0.25 MCG: 0.25 CAPSULE ORAL at 09:27

## 2025-03-14 RX ADMIN — ERTAPENEM SODIUM 1 G: 1 INJECTION, POWDER, LYOPHILIZED, FOR SOLUTION INTRAMUSCULAR; INTRAVENOUS at 06:10

## 2025-03-14 RX ADMIN — APIXABAN 5 MG: 5 TABLET, FILM COATED ORAL at 09:27

## 2025-03-14 RX ADMIN — PREDNISONE 5 MG: 5 TABLET ORAL at 09:27

## 2025-03-14 RX ADMIN — FOLIC ACID 1000 MCG: 1 TABLET ORAL at 09:27

## 2025-03-14 RX ADMIN — EZETIMIBE 10 MG: 10 TABLET ORAL at 09:27

## 2025-03-14 ASSESSMENT — ACTIVITIES OF DAILY LIVING (ADL)
ADLS_ACUITY_SCORE: 51

## 2025-03-14 NOTE — PLAN OF CARE
Physical Therapy Discharge Summary    Reason for therapy discharge:    Discharged to home with home therapy.    Progress towards therapy goal(s). See goals on Care Plan in [facility] electronic health record for goal details.  Goals met    Therapy recommendation(s):    Continued therapy is recommended.  Rationale/Recommendations:  recommend continued PT services with HH PT to progress pt's overall activity tolerance and IND. Recommend continued strength and balance training to improve safety..

## 2025-03-14 NOTE — PROGRESS NOTES
Care Management Discharge Note    Discharge Date: 03/14/2025     Discharge Disposition: Home    Discharge Services: Home Care, Home Infusion    [facility]   Roz Josue RN Clinical    Email: [email or web address]   M: [phone]  O: [phone]  F: [phone]   Services: Medications and supplies     [facility]  P: [phone]  F: [phone]  Services: RN, PT, OT  Lab draw fax to [facility] Information: Phone: [phone] Fax: [phone] (Attention ID clinic nurses)     Discharge DME: None    Discharge Transportation: family or friend will provide    Private pay costs discussed: Not applicable    Does the patient's insurance plan have a 3 day qualifying hospital stay waiver?  No    PAS Confirmation Code:  NA  Patient/family educated on Medicare website which has current facility and service quality ratings: no    Education Provided on the Discharge Plan: No  Persons Notified of Discharge Plans: pt, provider, [facility], [facility]  Patient/Family in Agreement with the Plan: yes    Handoff Referral Completed: Yes, [facility] PCP: Internal handoff referral completed    Additional Information:    Pt is med ready for discharge home today. Updated to all OP services. Home RN will come out to the pt house at 10 AM tomorrow to assist her with first dose of IV ABX. All medications and supplies of IV ABX will be delivered at 1800 at the pt ILF today. The pt understood all of this arrangement.     The pt will get both of IV ABX doses today prior to be picked up by her son, after 1600.    [x] HI order placed  [x] Updated [facility] HI Roz SALEH  [x] Home Care order placed  [x] Updated [facility]  [x] VINOD Caro RN    7C RN Coordinator  Phone: [phone]  3/14/2025  Units: 7C Med Surg 7401 thru 7418 RNCC     Social Work and Care Management Department   SEARCHABLE in [facility] - search CARE COORDINATOR   [facility]  [facility] ([phone]) Saturday & Sunday; ([phone]) [facility] Recognized Holidays   Units: 5A Onc 5201 - 5219 RNCC,  5A Onc 5220 thru 5240 RNCC, 5C OFFSERVICE [phone] RNCC & 5C OFF SERVICE [phone] RNCC   Units: 6B Vocera, 6C Card 6401 thru 6420 RNCC, 6C Card 6502 thru 6514 RNCC & 6C Card 6515 thru 6519 RNCC    Units: 7A SOT RNCC Vocera, 7B Med Surg Vocera, & 7C Med Surg 7502 thru 7521 RNCC   Units: 6A Vocera & 4A CVICU Vocera, 4C MICU Vocera, and 4E SICU Vocera     Units: 5 Ortho Vocera & 5 Med Surg Vocera    Units: 6 Med Surg Vocera & 8 Med Surg Vocera

## 2025-03-14 NOTE — PLAN OF CARE
[id number]. A&Ox4. VSS on RA. [place] IRA. Denied pain. Ax1 to BSC. Consistent carb diet - good appetite. R midline saline locked.      Goal Outcome Evaluation:      Plan of Care Reviewed With: patient    Overall Patient Progress: improving       Discharge to: Home  Transportation: Family  Time: 1730  Prescriptions: Sent to pharmacy of choice  Belongings: Sent w/ pt   -if applicable return home meds from inpatient pharmacy: N/A  Access: Midline left in place for home infusion   Care plan and education discontinued: Yes  Paperwork:  Reviewed and sent w/ pt

## 2025-03-14 NOTE — PLAN OF CARE
Goal Outcome Evaluation:                 Outcome Evaluation: Patient alert and oriented.  Very hard of hearing.  denies pain.  Gait steady with up to bathroom with walker.    A:  patient denies pain.  Lung sounds clear.    R:  continue to monitor and treat per plano of care.  Plan to discharge to home on IV antibiotics today.

## 2025-03-17 ENCOUNTER — TELEPHONE (OUTPATIENT)
Dept: INFECTIOUS DISEASES | Facility: CLINIC | Age: 75
End: 2025-03-17
Payer: MEDICARE

## 2025-03-17 ENCOUNTER — PATIENT OUTREACH (OUTPATIENT)
Dept: CARE COORDINATION | Facility: CLINIC | Age: 75
End: 2025-03-17
Payer: MEDICARE

## 2025-03-17 ENCOUNTER — MEDICAL CORRESPONDENCE (OUTPATIENT)
Dept: HEALTH INFORMATION MANAGEMENT | Facility: CLINIC | Age: 75
End: 2025-03-17

## 2025-03-17 NOTE — TELEPHONE ENCOUNTER
Follow up care to [facility], spoke with [phone].  Patient will have lab work and PICC dressing changes done on Wednesdays.  Confirmed fax and phone numbers.    Carrie had spoken with Reid at [facility], pharmacy plan confirmed.    Linda Tilley, BSN, RN  RN Care Coordinator Infectious Disease Clinic

## 2025-03-17 NOTE — PROGRESS NOTES
"Clinic Care Coordination Contact  Transitions of Care Outreach  Chief Complaint   Patient presents with    Clinic Care Coordination - Post Hospital     RNCC post hospital follow up       Most Recent Admission Date: 3/8/2025   Most Recent Admission Diagnosis: Pyelonephritis, acute - N10  Bladder infection, chronic - N30.20  Generalized abdominal pain - R10.84  Abnormal liver function tests - R79.89  Immunosuppressed status - D84.9  Sepsis secondary to UTI (H) - A41.9, N39.0  H/O liver transplant (H) - Z94.4  Bacteremia due to Enterococcus - R78.81, B95.2  Receiving intravenous antibiotic treatment as outpatient - Z79.2     Most Recent Discharge Date: 3/14/2025   Most Recent Discharge Diagnosis: Sepsis secondary to UTI (H) - A41.9, N39.0  Bladder infection, chronic - N30.20  Bacteremia due to Enterococcus - R78.81, B95.2  Abnormal liver function tests - R79.89  Immunosuppressed status - D84.9  H/O liver transplant (H) - Z94.4  Pyelonephritis, acute - N10  Generalized abdominal pain - R10.84  Receiving intravenous antibiotic treatment as outpatient - Z79.2  DERREK (acute kidney injury) - N17.9  Left flank pain - R10.9  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 I have a message out to my transplant coordinator to increase it\"  How are your symptoms? (Red Flag symptoms escalate to triage hotline per guidelines): Improved  Do you know how to contact your clinic care team if you have future questions or changes to your health status? : Yes  Does the patient have their discharge instructions? : Yes  Does the patient have questions regarding their discharge instructions? : No  Were you started on any new medications or were there changes to any of your previous medications? : Yes  Does the patient have all of their medications?: Yes  Do you have questions regarding " any of your medications? : No  Do you have all of your needed medical supplies or equipment (DME)?  (i.e. oxygen tank, CPAP, cane, etc.): Yes         Post-op (Clinicians Only)  Did the patient have surgery or a procedure: No      Follow up Plan     Discharge Follow-Up  Discharge follow up appointment scheduled in alignment with recommended follow up timeframe or Transitions of Risk Category? (Low = within 30 days; Moderate= within 14 days; High= within 7 days): Yes  Discharge Follow Up Appointment Date: 03/19/25  Discharge Follow Up Appointment Scheduled with?: Specialty Care Provider    Future Appointments   Date Time Provider Department Center   3/19/2025  1:00 PM Ciera Gutierrez MD [facility]   4/1/2025  3:15 PM Luciano May MD [facility]   4/2/2025  2:00 PM Maria Ines Otero MD [facility]   4/8/2025  8:30 AM Luciano May MD [facility]   4/10/2025  3:00 PM Daniel Hidalgo MD [facility]   4/17/2025 11:00 AM Gentry Ramirez MD [facility]   4/25/2025  2:00 PM Natividad Lundberg MD [facility]   6/6/2025  9:30 AM [facility]   6/6/2025 10:30 AM [facility]   6/17/2025 12:00 AM [facility]   6/19/2025 12:10 PM Meri Frost MD [facility] CLIN   7/7/2025  2:30 PM Vero Quiñonez MD [facility] SLEEP   10/20/2025  1:30 PM Gentry Ramirez MD [facility]   12/9/2025  2:45 PM Edel Blair, MIKE [facility]       Outpatient Plan as outlined on AVS reviewed with patient.    For any urgent concerns, please contact our 24 hour nurse triage line: [phone] ([phone])       RNCC performed outreach call to patient for post hospital follow up. Per patient homecare came out Saturday after discharge and the IV abx have been going okay. Regina complaining of arthritis pain, per patient had a transplant in 2002 and has been on 10mg of prednisone since, was put on 5mg after discharge and is  making her arthritis pain worse. Per patient has a message out to her transplant coordinator regarding this for them to follow up with arthritis pain. Patient denies any concerns regarding UTI which pt was hospitalized for.     Lead RNCC to follow up as scheduled     Tiffanie Hyatt RNCC  Primary Care Clinic Care Coordination  Tele: [phone]

## 2025-03-17 NOTE — TELEPHONE ENCOUNTER
Patient discharged, OPAT plan noted below.  Appt with Dr. Lundberg 4/25.  Patient is followed by  [facility] and [facility].  Will follow up with both agencies.    Linda Tilley, AURELION, RN  RN Care Coordinator Infectious Disease Clinic        Vikram Huddleston [facility]   Pharmacist  Pharmacy     Progress Notes     Addendum     Date of Service: 3/12/2025  3:59 PM  Creation Time: 3/12/2025  3:59 PM     Addendum             [facility]  Parenteral ANtibiotic Review at Departure from [facility] Note      Patient: Luz Thompson  MRN: [id number]  Allergies: Fluconazole; Mycophenolate; Penicillins; Simvastatin; Methotrexate; Morphine and codeine; Quinolones; Benadryl [diphenhydramine hcl]; Capsules, empty gelatin [gelatin]; Lansoprazole; Azithromycin; Bactrim [sulfamethoxazole-trimethoprim]; Cephalosporins; Ciprofloxacin hcl; Doxycycline; Lisinopril; Omeprazole; Tolectin [nsaids]; Tolmetin; and Tramadol     Current Location: [facility]  OPAT to be provided by: [facility]       Line Type: PICC     Diagnosis/Indications: bacteremia, pyelonephritis  Organism(s): Enterococcus faecalis, Klebsiella pneumoniae  MRDO? No  Pending Cultures/Microbiological Tests: yes 3/9 blood culture     Inpatient ID involved in developing OPAT plan: Yes - discharge OPAT plan has no changes from ID provider, Dr. Dawn, OPAT plan charted on 3/12/2025     Outpatient ID Follow-up: ID OPAT Clinic Referral Placed ([facility] Ph: [phone] and Fax: [phone]) - appointment scheduled  Designated Provider: Dr. Lundberg     Antimicrobial Regimen / Route Anticipated  Duration Start Date Stop /  Reassess Date   Ertapenem 1 g every 24 hours/IV 3 weeks 3/8/2025 3/28/2025   Vancomycin 750 mg every 24 hours/IV 2 weeks 3/9/2025 3/22/2025      Laboratory Tests and Monitoring Frequency: CBC with Diff, SCr, LFTs, CRP, Other (Urine culture 1 week following end of IV  Abx; vanc level 3/17) Once Weekly     Therapeutic Drug Monitoring: Vancomycin                 - Drug: Vancomycin                 - Goal(s): 400-600 mg*h/L                 - Level Type & Frequency: trough, once (3/17/2025)                 - Level(s) last checked date: 3/13/2025     ID Pharmacist Interventions: Therapy Duration                           Vikram Huddleston, PharmD  Pager: [phone]

## 2025-03-17 NOTE — PLAN OF CARE
Occupational Therapy Discharge Summary    Reason for therapy discharge:    Discharged to home with home therapy.    Progress towards therapy goal(s). See goals on Care Plan in [facility] electronic health record for goal details.  Goals not met.  Barriers to achieving goals:   discharge from facility.    Therapy recommendation(s):    Continued therapy is recommended.  Rationale/Recommendations:  Recommend HH OT to promote I/ADL independence and safety in the home setting.

## 2025-03-17 NOTE — TELEPHONE ENCOUNTER
[facility] Call Center    Phone Message    May a detailed message be left on voicemail: yes     Reason for Call: Other: please call [phone] [facility] and confirm if her labs can be moved to Weds 3/19/25. Mirella     Action Taken: Other: ID    Travel Screening: Not Applicable     Date of Service:

## 2025-03-18 ENCOUNTER — TELEPHONE (OUTPATIENT)
Dept: INFECTIOUS DISEASES | Facility: CLINIC | Age: 75
End: 2025-03-18
Payer: MEDICARE

## 2025-03-18 DIAGNOSIS — N39.0 RECURRENT UTI: Primary | ICD-10-CM

## 2025-03-18 NOTE — TELEPHONE ENCOUNTER
Call from [facility], they will need lab orders placed in EPIC. Labs will be processed at a [facility].  Home care had questions about dosing times with antibiotics, they will follow up with pharmacist Reid at [facility].    Linda Tilley, BSN, RN  RN Care Coordinator Infectious Disease Clinic

## 2025-03-19 ENCOUNTER — LAB (OUTPATIENT)
Dept: LAB | Facility: CLINIC | Age: 75
End: 2025-03-19
Payer: MEDICARE

## 2025-03-19 ENCOUNTER — TELEPHONE (OUTPATIENT)
Dept: TRANSPLANT | Facility: CLINIC | Age: 75
End: 2025-03-19

## 2025-03-19 DIAGNOSIS — A41.9 SEPSIS SECONDARY TO UTI (H): ICD-10-CM

## 2025-03-19 DIAGNOSIS — Z22.358 ESBL ESCHERICHIA COLI CARRIER: ICD-10-CM

## 2025-03-19 DIAGNOSIS — N39.0 SEPSIS SECONDARY TO UTI (H): ICD-10-CM

## 2025-03-19 DIAGNOSIS — N10 PYELONEPHRITIS, ACUTE: ICD-10-CM

## 2025-03-19 DIAGNOSIS — R10.9 LEFT FLANK PAIN: ICD-10-CM

## 2025-03-19 DIAGNOSIS — N39.0 RECURRENT UTI: ICD-10-CM

## 2025-03-19 DIAGNOSIS — N17.9 AKI (ACUTE KIDNEY INJURY): ICD-10-CM

## 2025-03-19 DIAGNOSIS — Z94.4 LIVER REPLACED BY TRANSPLANT (H): Primary | ICD-10-CM

## 2025-03-19 LAB
ANION GAP SERPL CALCULATED.3IONS-SCNC: 13 MMOL/L (ref 7–15)
BASOPHILS # BLD AUTO: 0.1 10E3/UL (ref 0–0.2)
BASOPHILS NFR BLD AUTO: 1 %
BUN SERPL-MCNC: 33.4 MG/DL (ref 8–23)
CALCIUM SERPL-MCNC: 9.2 MG/DL (ref 8.8–10.4)
CHLORIDE SERPL-SCNC: 105 MMOL/L (ref 98–107)
CREAT SERPL-MCNC: 1.59 MG/DL (ref 0.51–0.95)
EGFRCR SERPLBLD CKD-EPI 2021: 34 ML/MIN/1.73M2
EOSINOPHIL # BLD AUTO: 0.1 10E3/UL (ref 0–0.7)
EOSINOPHIL NFR BLD AUTO: 2 %
ERYTHROCYTE [DISTWIDTH] IN BLOOD BY AUTOMATED COUNT: 17.2 % (ref 10–15)
GLUCOSE SERPL-MCNC: 69 MG/DL (ref 70–99)
HCO3 SERPL-SCNC: 23 MMOL/L (ref 22–29)
HCT VFR BLD AUTO: 31.9 % (ref 35–47)
HGB BLD-MCNC: 9.7 G/DL (ref 11.7–15.7)
IMM GRANULOCYTES # BLD: 0.2 10E3/UL
IMM GRANULOCYTES NFR BLD: 3 %
LYMPHOCYTES # BLD AUTO: 2.2 10E3/UL (ref 0.8–5.3)
LYMPHOCYTES NFR BLD AUTO: 30 %
MCH RBC QN AUTO: 26.7 PG (ref 26.5–33)
MCHC RBC AUTO-ENTMCNC: 30.4 G/DL (ref 31.5–36.5)
MCV RBC AUTO: 88 FL (ref 78–100)
MONOCYTES # BLD AUTO: 0.8 10E3/UL (ref 0–1.3)
MONOCYTES NFR BLD AUTO: 12 %
NEUTROPHILS # BLD AUTO: 3.8 10E3/UL (ref 1.6–8.3)
NEUTROPHILS NFR BLD AUTO: 53 %
NRBC # BLD AUTO: 0 10E3/UL
NRBC BLD AUTO-RTO: 0 /100
PLATELET # BLD AUTO: 400 10E3/UL (ref 150–450)
POTASSIUM SERPL-SCNC: 4 MMOL/L (ref 3.4–5.3)
RBC # BLD AUTO: 3.63 10E6/UL (ref 3.8–5.2)
SODIUM SERPL-SCNC: 141 MMOL/L (ref 135–145)
VANCOMYCIN SERPL-MCNC: 25.9 UG/ML
WBC # BLD AUTO: 7.2 10E3/UL (ref 4–11)

## 2025-03-20 RX ORDER — PREDNISONE 20 MG/1
TABLET ORAL
Qty: 22 TABLET | Refills: 0 | Status: SHIPPED | OUTPATIENT
Start: 2025-03-20 | End: 2025-04-24

## 2025-03-21 ENCOUNTER — MEDICAL CORRESPONDENCE (OUTPATIENT)
Dept: HEALTH INFORMATION MANAGEMENT | Facility: CLINIC | Age: 75
End: 2025-03-21

## 2025-03-21 PROCEDURE — 80202 ASSAY OF VANCOMYCIN: CPT | Performed by: INTERNAL MEDICINE

## 2025-03-25 ENCOUNTER — MEDICAL CORRESPONDENCE (OUTPATIENT)
Dept: HEALTH INFORMATION MANAGEMENT | Facility: CLINIC | Age: 75
End: 2025-03-25

## 2025-03-25 NOTE — PROGRESS NOTES
"[facility]    ED Boarding Nurse Handoff Addendum Report:    Date/time: 1/4/2025, 4:49 PM    Activity Level: assist of 1    Fall Risk: Yes:  bed/chair alarm on, nonskid shoes/slippers when out of bed, arm band in place, patient and family education, assistive device/personal items within reach, and activity supervised    Active Infusions: None    Current Meds Due: See MAR    Current care needs: Cardiac monitoring. Monitor for fever and weakness. Monitor anticoagulant and glucose    Oxygen requirements (liters/min and/or FiO2): Yes    Respiratory status: Nasal cannula    Vital signs (within last 30 minutes):    Vitals:    01/04/25 1330 01/04/25 1400 01/04/25 1540 01/04/25 1554   BP: 99/52 99/51 124/65 (!) 141/64   BP Location:    Left arm   Pulse: 69 67 72 75   Resp: 17 14 18 18   Temp:    98.2  F (36.8  C)   TempSrc:    Oral   SpO2: 97% 96% 99% 98%   Weight:    83 kg (183 lb)   Height:    1.702 m (5' 7\")       Focused assessment within last 30 minutes:    VSS. O2 Sat 93% on oxygen via NC. . Sliding scale insulin given    ED Boarding Nurse name: Carly Kowalski RN    " ----- Message from [employer] sent at 3/25/2025  2:42 PM CDT -----  It is documented that the pt refused to schedule Pulmonology.Mitali

## 2025-03-26 DIAGNOSIS — N18.32 STAGE 3B CHRONIC KIDNEY DISEASE (H): ICD-10-CM

## 2025-03-26 DIAGNOSIS — D64.9 ANEMIA: ICD-10-CM

## 2025-03-26 DIAGNOSIS — E55.9 VITAMIN D DEFICIENCY: Primary | ICD-10-CM

## 2025-03-26 NOTE — PROGRESS NOTES
"Called patient due to not having labs done and no lab visit was scheduled when patient made the appt. Patient is very frustrated that nobody told her about this when she made the appt. Continued a long discussion of why she can't get into the clinic to have the labs done. After about 10 minutes of telling RN why she won't be able to get labs done; stated \"I will work on this project\". Writer did offer to make an appt for the day she is in clinic, but patient declined.   "

## 2025-03-27 ENCOUNTER — TELEPHONE (OUTPATIENT)
Dept: INFECTIOUS DISEASES | Facility: CLINIC | Age: 75
End: 2025-03-27
Payer: MEDICARE

## 2025-03-27 DIAGNOSIS — N39.0 URINARY TRACT INFECTION: Primary | ICD-10-CM

## 2025-03-27 NOTE — TELEPHONE ENCOUNTER
Follow up call to [facility], spoke with  Rylie.  Reviewed weekly lab work, will draw tomorrow.  Rylie will plan to see patient again next Friday to obtain urine specimen.    Provided call back number to this RN for questions.    Linda Tilley, BSN, RN  RN Care Coordinator Infectious Disease Clinic

## 2025-03-27 NOTE — TELEPHONE ENCOUNTER
Call from home care nurse Rylie, patient stated she wants to do her lab work on Monday at the clinic. Lab orders entered.    CBC with Diff, SCr, LFTs, CRP    AURELIO RossN, RN  RN Care Coordinator Infectious Disease Clinic

## 2025-03-28 ENCOUNTER — TELEPHONE (OUTPATIENT)
Dept: INTERNAL MEDICINE | Facility: CLINIC | Age: 75
End: 2025-03-28
Payer: MEDICARE

## 2025-03-28 NOTE — TELEPHONE ENCOUNTER
3/5/25 medication update and 3/20/25 vancomycin order received via fax. Form in your mailbox to be signed.

## 2025-03-31 ENCOUNTER — MEDICAL CORRESPONDENCE (OUTPATIENT)
Dept: HEALTH INFORMATION MANAGEMENT | Facility: CLINIC | Age: 75
End: 2025-03-31

## 2025-03-31 DIAGNOSIS — M19.90 ARTHRITIS: ICD-10-CM

## 2025-03-31 DIAGNOSIS — Z94.4 LIVER REPLACED BY TRANSPLANT (H): Primary | ICD-10-CM

## 2025-03-31 DIAGNOSIS — N39.0 URINARY TRACT INFECTION: ICD-10-CM

## 2025-03-31 RX ORDER — PREDNISONE 10 MG/1
10 TABLET ORAL DAILY
Qty: 90 TABLET | Refills: 3 | Status: SHIPPED | OUTPATIENT
Start: 2025-03-31

## 2025-04-01 ENCOUNTER — TELEPHONE (OUTPATIENT)
Dept: INFECTIOUS DISEASES | Facility: CLINIC | Age: 75
End: 2025-04-01

## 2025-04-01 ENCOUNTER — TELEPHONE (OUTPATIENT)
Dept: CARDIOLOGY | Facility: CLINIC | Age: 75
End: 2025-04-01

## 2025-04-01 ENCOUNTER — VIRTUAL VISIT (OUTPATIENT)
Dept: DERMATOLOGY | Facility: CLINIC | Age: 75
End: 2025-04-01
Payer: MEDICARE

## 2025-04-01 DIAGNOSIS — I48.0 PAROXYSMAL ATRIAL FIBRILLATION (H): ICD-10-CM

## 2025-04-01 DIAGNOSIS — I10 BENIGN ESSENTIAL HYPERTENSION: Primary | ICD-10-CM

## 2025-04-01 DIAGNOSIS — N39.0 URINARY TRACT INFECTION: ICD-10-CM

## 2025-04-01 DIAGNOSIS — C44.329 SQUAMOUS CELL CANCER OF SKIN OF LEFT CHEEK: Primary | ICD-10-CM

## 2025-04-01 PROCEDURE — 98013 SYNCH AUDIO-ONLY EST LOW 20: CPT | Mod: GC | Performed by: DERMATOLOGY

## 2025-04-01 NOTE — LETTER
4/1/2025       RE: Luz Thompson  [address]  [place] [address]     Dear Colleague,    Thank you for referring your patient, Luz Thompson, to the [facility] DERMATOLOGIC SURGERY CLINIC [place] at [facility]. Please see a copy of my visit note below.    [facility] Dermatology Note  Encounter Date: Apr 1, 2025    Virtual Visit Details    Type of service:  Telephone Visit   Phone call duration: 10 minutes   Originating Location (pt. Location): Home    Distant Location (provider location):  On-site  Telephone visit completed due to the patient did not have access to video, while the distant provider did.    Dermatologic Surgery Telemedicine Consult Note    Dermatology Problem List:  # History NMSC,  - L cheek, SCC, S/p Biopsy 2/20/25, pending Mohs 4/8/25     ------ per patient report, tx in [place]  - SCC, R eyebrow, 2024  - SCC, R forehead, 2023  - SCC, L hand, 2021  - SCC, L upper arm, unknown date  # Pruritic papular skin eruption-self-induced, resolved  # Mild atopic dermatitis- hands, thumbs, abdomen, back   - previous: Amlactin, patient discontinued   # Actinic keratoses- right temple, left cheek, s/p LN2  # Onychomycosis-  Oral voriconazole, prescribed by Dr. Montero ()   # Drug hypersensitivity reaction- fluconazole  - residual lesions of abdomen and chest   # Atrophe shanna  # Drug allergies   -s/p intradermal testing to several ABX 8/2019, noncontributory  -plan: oral provocation testing   __________________________________________________________________    CC: RECHECK (SCC L cheek)    Subjective: Luz Thompson is a 74 year old female who presents today for Mohs micrographic surgery consultation for a recent diagnosis of skin cancer.  - Skin cancer(s): SCC  - Location(s): L cheek  - Weeping still, has had mohs multiple times in the past. Is allergic to many antibiotic medications. Would like anxiolytic  "medication before the excision, although she has never used this medication before. Also requests LMX/BMT before lidocaine injections as she states she will \"scream and holler\" if the pain is unbearable and that her threshold for pain is low.  - no other concerns today         Objective:   Skin: Focused examination of the Left cheek within the teledermatology photograph(s) on 2/20/25 was performed.   -  large pink circular well-demarcated plaque           Path report: 2/20/25   Case: [id number]   Final Diagnosis   A. Left cheek:  - Squamous cell carcinoma, well-differentiated - (see description)     B. Right submandibular neck:  - Consistent with lichen simplex chronicus/prurigo nodularis, with no evidence of malignancy - (see description)       Assessment and Plan:     1. Plan for Mohs micrographic surgery for skin cancer(s) above:  *Review lab result(s): Dermpath report   - Patient would like anxiolytic as well as LMX/BMT for topical anesthesia to lidocaine injections.  - We discussed the nature of the diagnosis/condition above. We discussed the treatment options, including the risks benefits and expectations of these options. We recommend micrographic surgery as the most effective and most tissue sparing option for treatment, and the patient agrees to proceed with this.  The patient is aware of the risks, benefits and expectations of this procedure. The patient will be scheduled for this procedure, if not already done so.  - We anticipate the following closure type: Linear closure, such as complex linear closure (CLC)    The patient was discussed with and evaluated by attending physician, Dr. May     Scribe Disclosure:   I, JUSTIN TADEO, am serving as a scribe; to document services personally performed by Luciano May MD -based on data collection and the provider's statements to me.     Provider Disclosure:  I agree with above History, Review of Systems, Physical exam and Plan.  I have reviewed the " content of the documentation and have edited it as needed. I have personally performed the services documented here and the documentation accurately represents those services and the decisions I have made.      Electronically signed by:    Paddy Chamberlain DO, MS  PGY-3  Dermatology  [facility]  04/01/2025 4:01 PM    Staff Physician Comments:   I saw the photos and evaluated the patient with the dermatology resident (Dr. Paddy Chamberlain) and I agree with the assessment and plan. I was present for the key portions of the telephone call.     Luciano May DO    Department of Dermatology  [facility]: Phone: [phone], Fax:[phone]  [facility] [place]: Phone: [phone], Fax: [phone]        Again, thank you for allowing me to participate in the care of your patient.      Sincerely,    Luciano May MD

## 2025-04-01 NOTE — NURSING NOTE
Chief Complaint   Patient presents with    RECHECK     SCC L nik Adan RN-BSN  Dermatology Surgery

## 2025-04-01 NOTE — TELEPHONE ENCOUNTER
Message returned to [facility] after voicemail left.   Patient will now be getting blood lab work done today in clinic.    Patient is requesting to delay collecting of the UA/UC until 4/7 due to her birthday and social events.    Antibiotics are done. Message left with [facility] to confirm antibiotics are done.    Midline is still in place.    ID follow up not until 4/28 with Dr. Lim.     Message sent to ID providers to confirm if midline should be removed prior to UA/UC being resulted.    This RN will continue to follow.    Linda Tilley, BSN, RN  RN Care Coordinator Infectious Disease Clinic

## 2025-04-01 NOTE — TELEPHONE ENCOUNTER
"VM received: Pt states her Oximeter shows that her HR  is 47 and she feels light headed    Pt has [employer] Lux 2 loop recorder. Placed for management of afib    Turned on symptom trigger and called pt and asked her to send a transmission. Pt states it will take her some time but she will send a transmission. Unfortunately we will not receive this until after MN tonight. I have no way of knowing what her HR or Rhythm is at this time.     Pt has a history of PAF and is on Eliquis and takes PRN Metoprolol for break thru Afib episodes.    Asked pt to also recheck her B/P and HR                                     B/P  with 186/84  HR 56.    Recommended pt go to Urgent care or ER for evaluation if she is symptomatic.   Pt adamantly refuses to go to ER or urgent care. She states she has no way to get there and she is immunosuppressed. She states she is not spending the next 7-10 hour sitting in the ER. She just got out of the hospital after spending 1.5 months there and doesn't want to go back.    Pt asked if I could forward this message to Dr Zuluaga's team for his recommendation. Pt states she will not take her metoprolol today but is due for her Hydralazine at 3pm. States she just needs to \"calm down\" and her b/p will go down.   Pt crying sating \" why does life have to be so difficult\". When asked if she is still light headed she says yes.   Explained I have concerns over her falling but pt states \"that will NOT happen\" . She understands the fall risk concerns. States her daughter will be coming over this afternoon to be with her.    Will send to Dr Zuluaga's team for review.    Jennifer LAZCANO  "

## 2025-04-01 NOTE — PATIENT INSTRUCTIONS
We discussed the mohs procedure in great detail on the phone today.     We will provide you with topical anesthesia as well as a medication for anxiety during the procedure.     We will see you in a week for your appointment (4/8/25)

## 2025-04-01 NOTE — PROGRESS NOTES
"[facility] Dermatology Note  Encounter Date: Apr 1, 2025    Virtual Visit Details    Type of service:  Telephone Visit   Phone call duration: 10 minutes   Originating Location (pt. Location): Home    Distant Location (provider location):  On-site  Telephone visit completed due to the patient did not have access to video, while the distant provider did.    Dermatologic Surgery Telemedicine Consult Note    Dermatology Problem List:  # History NMSC,  - L cheek, SCC, S/p Biopsy 2/20/25, pending Mohs 4/8/25     ------ per patient report, tx in [place]  - SCC, R eyebrow, 2024  - SCC, R forehead, 2023  - SCC, L hand, 2021  - SCC, L upper arm, unknown date  # Pruritic papular skin eruption-self-induced, resolved  # Mild atopic dermatitis- hands, thumbs, abdomen, back   - previous: Amlactin, patient discontinued   # Actinic keratoses- right temple, left cheek, s/p LN2  # Onychomycosis-  Oral voriconazole, prescribed by Dr. Montero ([facility])   # Drug hypersensitivity reaction- fluconazole  - residual lesions of abdomen and chest   # Atrophe shanna  # Drug allergies   -s/p intradermal testing to several ABX 8/2019, noncontributory  -plan: oral provocation testing   __________________________________________________________________    CC: RECHECK (SCC L cheek)    Subjective: Luz Thompson is a 74 year old female who presents today for Mohs micrographic surgery consultation for a recent diagnosis of skin cancer.  - Skin cancer(s): SCC  - Location(s): L cheek  - Weeping still, has had mohs multiple times in the past. Is allergic to many antibiotic medications. Would like anxiolytic medication before the excision, although she has never used this medication before. Also requests LMX/BMT before lidocaine injections as she states she will \"scream and holler\" if the pain is unbearable and that her threshold for pain is low.  - no other concerns today         Objective:   Skin: Focused examination of the Left cheek " within the teledermatology photograph(s) on 2/20/25 was performed.   -  large pink circular well-demarcated plaque           Path report: 2/20/25   Case: [id number]   Final Diagnosis   A. Left cheek:  - Squamous cell carcinoma, well-differentiated - (see description)     B. Right submandibular neck:  - Consistent with lichen simplex chronicus/prurigo nodularis, with no evidence of malignancy - (see description)       Assessment and Plan:     1. Plan for Mohs micrographic surgery for skin cancer(s) above:  *Review lab result(s): Dermpath report   - Patient would like anxiolytic as well as LMX/BMT for topical anesthesia to lidocaine injections.  - We discussed the nature of the diagnosis/condition above. We discussed the treatment options, including the risks benefits and expectations of these options. We recommend micrographic surgery as the most effective and most tissue sparing option for treatment, and the patient agrees to proceed with this.  The patient is aware of the risks, benefits and expectations of this procedure. The patient will be scheduled for this procedure, if not already done so.  - We anticipate the following closure type: Linear closure, such as complex linear closure (CLC)    The patient was discussed with and evaluated by attending physician, Dr. May     Scribe Disclosure:   I, JUSTIN TADEO, am serving as a scribe; to document services personally performed by Luciano May MD -based on data collection and the provider's statements to me.     Provider Disclosure:  I agree with above History, Review of Systems, Physical exam and Plan.  I have reviewed the content of the documentation and have edited it as needed. I have personally performed the services documented here and the documentation accurately represents those services and the decisions I have made.      Electronically signed by:    Paddy Chamberlain DO MS  PGY-3  Dermatology  [facility]  04/01/2025 4:01 PM    Staff  Physician Comments:   I saw the photos and evaluated the patient with the dermatology resident (Dr. Paddy Chamberlain) and I agree with the assessment and plan. I was present for the key portions of the telephone call.     Luciano May DO    Department of Dermatology  [facility]: Phone: [phone], Fax:[phone]  [facility]: Phone: [phone], Fax: [phone]

## 2025-04-02 ENCOUNTER — TELEPHONE (OUTPATIENT)
Dept: NEPHROLOGY | Facility: CLINIC | Age: 75
End: 2025-04-02

## 2025-04-02 ENCOUNTER — VIRTUAL VISIT (OUTPATIENT)
Dept: NEPHROLOGY | Facility: CLINIC | Age: 75
End: 2025-04-02
Attending: STUDENT IN AN ORGANIZED HEALTH CARE EDUCATION/TRAINING PROGRAM
Payer: MEDICARE

## 2025-04-02 ENCOUNTER — TELEPHONE (OUTPATIENT)
Dept: INFECTIOUS DISEASES | Facility: CLINIC | Age: 75
End: 2025-04-02

## 2025-04-02 DIAGNOSIS — E11.22 TYPE 2 DIABETES MELLITUS WITH STAGE 3B CHRONIC KIDNEY DISEASE, WITHOUT LONG-TERM CURRENT USE OF INSULIN (H): ICD-10-CM

## 2025-04-02 DIAGNOSIS — N39.0 URINARY TRACT INFECTION: ICD-10-CM

## 2025-04-02 DIAGNOSIS — N18.32 STAGE 3B CHRONIC KIDNEY DISEASE (H): ICD-10-CM

## 2025-04-02 DIAGNOSIS — N18.32 TYPE 2 DIABETES MELLITUS WITH STAGE 3B CHRONIC KIDNEY DISEASE, WITHOUT LONG-TERM CURRENT USE OF INSULIN (H): ICD-10-CM

## 2025-04-02 DIAGNOSIS — C73 PAPILLARY THYROID CARCINOMA (H): ICD-10-CM

## 2025-04-02 DIAGNOSIS — E11.621 ULCER OF FOOT DUE TO TYPE 2 DIABETES MELLITUS (H): Primary | ICD-10-CM

## 2025-04-02 DIAGNOSIS — L97.509 ULCER OF FOOT DUE TO TYPE 2 DIABETES MELLITUS (H): Primary | ICD-10-CM

## 2025-04-02 DIAGNOSIS — I50.32 CHRONIC DIASTOLIC HEART FAILURE (H): ICD-10-CM

## 2025-04-02 DIAGNOSIS — N18.4 CHRONIC KIDNEY DISEASE, STAGE 4 (SEVERE) (H): ICD-10-CM

## 2025-04-02 DIAGNOSIS — E55.9 VITAMIN D DEFICIENCY: ICD-10-CM

## 2025-04-02 PROCEDURE — 98003 SYNCH AUDIO-VIDEO NEW HI 60: CPT | Performed by: INTERNAL MEDICINE

## 2025-04-02 PROCEDURE — 1125F AMNT PAIN NOTED PAIN PRSNT: CPT | Mod: 95 | Performed by: INTERNAL MEDICINE

## 2025-04-02 RX ORDER — METFORMIN HYDROCHLORIDE 500 MG/1
500 TABLET, EXTENDED RELEASE ORAL
Qty: 90 TABLET | Refills: 3 | Status: SHIPPED | OUTPATIENT
Start: 2025-04-02 | End: 2026-03-28

## 2025-04-02 RX ORDER — CARVEDILOL 25 MG/1
25 TABLET ORAL 2 TIMES DAILY WITH MEALS
Qty: 60 TABLET | Refills: 2 | Status: SHIPPED | OUTPATIENT
Start: 2025-04-02

## 2025-04-02 NOTE — TELEPHONE ENCOUNTER
Kirill Zuluaga MD Friedlund, Gwen D, RN; [facility] Heart Team 22 hours ago (8:21 AM)     Given the fact that her blood pressure has been difficult to control.    She is emotionally labile.    And having bursts of A-fib I would like to change her metoprolol 25 mg daily to carvedilol 25 mg twice daily.    She is already on Eliquis.  Thanks.     ==============================================================    Patient called to inform of above interpretation and recommendation from Dr. Zuluaga. Patient is agreeable to plan and verbalizes understanding.     Patient would like to discuss getting loop recorder taken out with advancement in technology and smart watches. Would alos like Dr. Zuluaga's input on if pacemaker should be considered due to two episodes of low HR readings in the 40's.     Routed to Dr. Zuluaga for review.

## 2025-04-02 NOTE — PROGRESS NOTES
Virtual Visit Details    Type of service:  Video Visit   Video Start Time: 1:15 pm  Video End Time:2:44 PM    Originating Location (pt. Location): Home    Distant Location (provider location):  On-site  Platform used for Video Visit: [facility]    Nephrology Clinic    Luz Thompson MRN:[id number] YOB: 1950  Date of Service: 04/02/2025  Primary care provider: Daniel Hidalgo  Requesting physician: Daniel Hidalgo      REASON FOR CONSULT: CKD    HISTORY OF PRESENT ILLNESS:   Luz Thompson is a 74 year old female who presents for evaluation of CKD.  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    From a renal standpoint, the patient reports having had kidney disease since her native liver failed and she needed dialysis at the time of her transplant. She also states that since then she has had a baseline creatinine level around 1.5 mg/dL. Review of her file shows a creatinine level fluctuating between 1.2 and 1.8 mg/dL over the past 6 months with the last value measured on 3/19/25 and being at 1.59 mg/dL. The patient last had her proteinuria quantified in January 2022 and it was 775 mg/ 24 h. A recent CT scan of the abdomen done in January 2025 reports bilateral cortical atrophy of the kidneys along with a punctuate left renal stone.     For hypertension, the patient is on amlodipine 2.5 mg daily, hydralazine 50 mg tid and carvedilol 25 mg twice daily. Her blood pressure can be labile at times but has been most recently on the high side and her metoprolol was subsequently switched to carvedilol.   For diabetes she is on linagliptin 5 mg daily only. Her most recent Hba1c is 6.9 in December 2024. She reports that her glycemia often  "increases to 250 in the afternoon.      The patient denies any dysuria, any pollakiuria, any nocturia, any LE edema. She reports however low grade fever a few days ago but attributes it to a known history of squamous cell cancer.    The following portions of the patient's history were reviewed and updated as appropriate: allergies, current medications, past family history, past medical history, past social history, past surgical history and problem list.    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     PAST SURGICAL HISTORY:  Past Surgical History:   Procedure Laterality Date    APPENDECTOMY  1961    " CATARACT IOL, RT/LT      RE12/19/2013, LE12/10/2013 - Toric lenses    CHOLECYSTECTOMY  1991    COLONOSCOPY  03/10/2014    Procedure: COLONOSCOPY;;  Surgeon: Gentry Ramirez MD;  Location: [facility]    CYSTOSCOPY      ear drum repair      ENDOBRONCHIAL ULTRASOUND FLEXIBLE N/A 09/29/2017    Procedure: ENDOBRONCHIAL ULTRASOUND FLEXIBLE;  Flexible Bronchoscopy, Endobronchial Ultrasound, Transbronchial Needle Aspiration ;  Surgeon: Eden Clinton MD;  Location: [facility] OR    ENDOSCOPIC RETROGRADE CHOLANGIOPANCREATOGRAM  09/19/2013    Procedure: ENDOSCOPIC RETROGRADE CHOLANGIOPANCREATOGRAM;  Endoscopic Retrograde Cholangiopancreatogram with single balloon enteroscopy, ballon sweep of bile duct;  Surgeon: Brett Membreno MD;  Location: [facility] OR    HC KNEE SCOPE,MED/LAT MENISECTOMY Right 08/10/2012    partial medial menisectomy only    KNEE SURGERY  1966    R knee    PICC INSERTION  09/18/2013    4fr SL PASV PICC, 40cm (1cm external) in the R basilic vein w/ tip in the low SVC    PICC INSERTION  02/21/2014    5 fr DL BioFlo Navilyst PICC, 46 cm (3 cm external) in the L basilic vein w/ tip in the SVC RA junction.    THYROIDECTOMY  03/2010    TRANSPLANT LIVER RECIPIENT LIVING UNRELATED  05/2002     MEDICATIONS:  Prescription Medications as of 4/2/2025         Rx Number Disp Refills Start End Last Dispensed Date Next Fill Date Owning Pharmacy    acetaminophen (TYLENOL) 325 MG tablet  -- -- 3/12/2025 --       Sig: Take 2 tablets (650 mg) by mouth every 4 hours as needed for mild pain or other (and adjunct with moderate or severe pain or per patient request).    Class: Transitional    Route: Oral    amLODIPine (NORVASC) 2.5 MG tablet  90 tablet 3 3/28/2025 --   [facility] - [place], [place] - [address] [place] [address]    Sig: Take 1 tablet (2.5 mg) by mouth daily.    Class: E-Prescribe    Notes to Pharmacy: Patient to call when ready to fill    Route: Oral    apixaban ANTICOAGULANT (ELIQUIS) 5 MG tablet  180 tablet 1 2/13/2025 --    [facility] [address]    Sig: Take 1 tablet (5 mg) by mouth 2 times daily.    Class: E-Prescribe    Notes to Pharmacy: NEW TABLET STRENGTH, PATIENT AWARE (OK to use up current supply of 2.5 mg tablets by taking 2 tablets in the morning and 2 tablets in the evening).    Route: Oral    calcitRIOL (ROCALTROL) 0.25 MCG capsule  90 capsule 1 12/27/2024 --   [facility] [address]    Sig: Take 1 capsule (0.25 mcg) by mouth daily.    Class: E-Prescribe    Route: Oral    carvedilol (COREG) 25 MG tablet  60 tablet 2 4/2/2025 --   [facility] [address] [place] [address]    Sig: Take 1 tablet (25 mg) by mouth 2 times daily (with meals).    Class: E-Prescribe    Route: Oral    Continuous Glucose Sensor (FREESTYLE NINO 2 SENSOR) [facility]  2 each 5 12/27/2024 --   [address]    Sig: Change every 14 days.    Class: E-Prescribe    Notes to Pharmacy: Medicare B    D-MANNOSE PO  -- --  --       Sig: Take 1 Scoop by mouth 2 times daily.    Class: Historical    Route: Oral    diphenhydrAMINE (BENADRYL) 12.5 MG/5ML elixir  -- -- 3/14/2025 --       Sig: Take 20 mLs (50 mg) by mouth every 6 hours as needed for allergies (if reaction after linezolid).    Class: OTC    Route: Oral    EPINEPHrine (ANY BX GENERIC EQUIV) 0.3 MG/0.3ML injection 2-pack  2 each 1 12/15/2024 --   [place], [place] - [address] [address] [address]    Sig: Inject 0.3 mLs (0.3 mg) into the muscle as needed for anaphylaxis. May repeat one time in 5-15 minutes if response to initial dose is inadequate.    Class: E-Prescribe    Route: Intramuscular    estradiol (ESTRACE) 0.1 MG/GM vaginal cream  -- --  --       Sig: Place vaginally every other day.    Class: Historical    Route: Vaginal    No prior authorization was found for this prescription.    Found prior authorization for another prescription  for the same medication: Closed - The  is not the PA processor for this patient.    evolocumab (REPATHA SURECLICK) 140 MG/ML prefilled autoinjector  6 mL 3 11/29/2024 --   [facility] [address] [place] [address]    Sig: Inject 1 mL (140 mg) subcutaneously every 14 days. . Please have fasting labs drawn for further refills.    Class: E-Prescribe    Route: Subcutaneous    ezetimibe (ZETIA) 10 MG tablet  90 tablet 3 2/27/2025 --   [facility] - [address]    Sig: Take 1 tablet (10 mg) by mouth daily.    Class: E-Prescribe    Route: Oral    Ferrous Sulfate 324 MG TBEC  -- --  --       Sig: Take 1 tablet by mouth 2 times daily as needed.    Class: Historical    Route: Oral    folic acid (FOLVITE) 1 MG tablet  90 tablet 3 2/17/2025 --   [facility] - [address]    Sig: TAKE 1 TABLET BY MOUTH DAILY    Class: E-Prescribe    Notes to Pharmacy: Please send a replace/new response with 90-Day Supply if appropriate to maximize member benefit. Requesting 1 year supply.    Route: Oral    gabapentin (NEURONTIN) 250 MG/5ML solution  180 mL 0 8/14/2024 --   [id number] - [place], [place] - [address] [place] [address] [address] [facility]    Sig: Take 6 mLs (300 mg) by mouth at bedtime    Class: E-Prescribe    Route: Oral    glucose (BD GLUCOSE) 5 g chewable tablet  40 tablet 1 9/19/2019 --   [id number] - [facility] [address] [facility]    Sig: Take 2 tablets (10 g) by mouth as needed (low blood sugar)    Class: E-Prescribe    Route: Oral    hydrALAZINE (APRESOLINE) 50 MG tablet  270 tablet 3 3/28/2025 --   [facility] [address] [place] [address]    Sig: Take 1 tablet (50 mg) by mouth 3 times daily.    Class: E-Prescribe    Notes to Pharmacy: Patient will call when ready to fill    Route: Oral    ketoconazole (NIZORAL) 2 % external cream  30 g 3 12/9/2024 --   [facility] [address] [place] [address]    Sig: Apply topically 2 times daily as needed (redness and flaking). Apply to the face.    Class: E-Prescribe    Route: Topical    ketoconazole (NIZORAL) 2 % external shampoo  100 mL 11 12/9/2024 --   [facility] [address] [place] [address]    Sig: Apply topically three times a week. Lather in the shower, wait 3-5 minutes before rinsing.    Class: E-Prescribe    Route: Topical    levothyroxine (SYNTHROID/LEVOTHROID) 175 MCG tablet  90 tablet 1 12/27/2024 --   [facility] [address]    Sig: Take 1 tablet (175 mcg) by mouth daily.    Class: E-Prescribe    Route: Oral    linagliptin (TRADJENTA) 5 MG TABS tablet  90 tablet 1 12/30/2024 --   [facility] [address]    Sig: Take 1 tablet (5 mg) by mouth daily.    Class: E-Prescribe    Route: Oral    meclizine (ANTIVERT) 25 MG tablet  30 tablet 11 8/31/2019 --   [address]    Sig: Take 1 tablet (25 mg) by mouth as needed for dizziness or other (migraines)    Class: Transitional    Route: Oral    Multiple Vitamins-Minerals (PRESERVISION AREDS 2) CAPS  -- -- 8/31/2019 --   [address]    Sig: Take 1 capsule by mouth 2 times daily    Class: Transitional    Route: Oral    Nutrisource Fiber PO packet  -- --  --       Sig: Take 1 packet by mouth daily.    Class: Historical    Route: Oral    omega-3 acid ethyl esters (LOVAZA) 1 g capsule  180 capsule 1 2/27/2025 --   [facility] [address]    Sig: Take 1 capsule by mouth 2 times daily.    Class: E-Prescribe    Route: Oral    predniSONE (DELTASONE) 10 MG tablet  90 tablet 3 3/31/2025 --   [facility] [place] [address] [address]    Sig: Take 1 tablet (10 mg) by mouth daily.    Class: E-Prescribe     Notes to Pharmacy: TXP DT 5/22/2002 (Liver) TXP Dischg DT 6/3/2002 DX Liver replaced by transplant Z94.4 [facility] ([place], [place])    Route: Oral    senna-docusate (SENOKOT-S/PERICOLACE) 8.6-50 MG tablet  15 tablet 0 3/12/2025 --   [facility] [address] [place] [address]    Sig: Take 1 tablet by mouth 2 times daily as needed for constipation.    Class: E-Prescribe    Route: Oral    Renewals       Renewal requests to authorizing provider (Karin Cast, MIKE) <b>prohibited</b>            sirolimus (GENERIC EQUIVALENT) 1 MG tablet  90 tablet 3 11/27/2024 --   [facility] - [address]    Sig: Take 1 tablet (1 mg) by mouth daily.    Class: E-Prescribe    Notes to Pharmacy: TXP DT 5/22/2002 (Liver) TXP Dischg DT 6/3/2002 DX Liver replaced by transplant Z94.4 [facility] ([place], [place])    Route: Oral    ursodiol (ACTIGALL) 250 MG tablet  180 tablet 3 11/27/2024 --   [facility] - [address]    Sig: Take 1 tablet (250 mg) by mouth 2 times daily.    Class: E-Prescribe    Route: Oral           ALLERGIES:    Allergies   Allergen Reactions    Fluconazole Hives and Itching     Full body hives  **Intradermal skin testing negative**  [See intradermal skin testing results from 8/2/2019]    Mycophenolate Diarrhea and Nausea and Vomiting     Patient stated it was chronic and lasted months      Penicillins Anaphylaxis, Hives, Itching and Rash     **Intradermal skin testing negative**  [See intradermal skin testing results from 8/2/2019]      Simvastatin Muscle Pain (Myalgia)     severe  Other reaction(s): Myalgia caused by statin    Methotrexate Other (See Comments)     Other reaction(s): Sore  Sores in mouth, esophagus, and stomach.       Morphine And Codeine Itching and Other (See Comments)     Psych disturbance  Other reaction(s):  "Confusion, Mood alteration    Quinolones Anxiety, Dizziness, Headache, Other (See Comments), Palpitations and Unknown     Other reaction(s): Hyperactive behavior, Lightheadedness, Mood alteration    Dizzy, light headed    Dizziness, shaky, and jumpy    Benadryl [Diphenhydramine Hcl]      Insomnia     Capsules, Empty Gelatin [Gelatin]     Lansoprazole Diarrhea    Azithromycin Itching     [See intradermal skin testing results from 2019]    Bactrim [Sulfamethoxazole-Trimethoprim] Other (See Comments)     Numb mouth, tingling lips (treated with anti-histamines)    Cephalosporins Itching     [See intradermal skin testing results from 2019]    Ciprofloxacin Hcl Other (See Comments) and Dizziness     Insomnia, mood lability, Irregular heart beat         Doxycycline Itching and Unknown     [See intradermal skin testing results from 2019]    Lisinopril Cough    Omeprazole Itching    Tolectin [Nsaids] Rash    Tolmetin Rash and Itching    Tramadol Rash, Hives and Itching     REVIEW OF SYSTEMS:    A comprehensive review of systems was performed and found to be negative except as described here or above.  SOCIAL HISTORY:   Social History     Socioeconomic History    Marital status:      Spouse name: Robbin Thompson    Number of children: 4    Years of education: 20    Highest education level: Not on file   Occupational History    Occupation: Guardian Conservator      Employer: [employer]     Comment: social work     Employer: [employer]   Tobacco Use    Smoking status: Former     Current packs/day: 0.00     Average packs/day: 1 pack/day for 18.0 years (18.0 ttl pk-yrs)     Types: Cigarettes     Start date: 1967     Quit date: 1985     Years since quittin.0    Smokeless tobacco: Never   Vaping Use    Vaping status: Never Used   Substance and Sexual Activity    Alcohol use: Yes     Alcohol/week: 0.0 standard drinks of alcohol     Comment: rare - \"I toast at weddings\"    Drug use: No    " Sexual activity: Yes     Partners: Male     Birth control/protection: Post-menopausal   Other Topics Concern    Parent/sibling w/ CABG, MI or angioplasty before 65F 55M? Not Asked     Service Not Asked    Blood Transfusions Not Asked    Caffeine Concern Not Asked    Occupational Exposure Not Asked    Hobby Hazards Not Asked    Sleep Concern Not Asked    Stress Concern Not Asked    Weight Concern Not Asked    Special Diet Not Asked    Back Care Not Asked    Exercise Yes     Comment: cardio and strengthing    Bike Helmet Not Asked    Seat Belt Not Asked    Self-Exams Not Asked   Social History Narrative    She is retired. She lives in the [facility] [place] the course of the [place]. 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     Social Drivers of Health     Financial Resource Strain: Low Risk  (3/12/2025)    Financial Resource Strain     Within the past 12 months, have you or your family members you live with been unable to get utilities (heat, electricity) when it was really needed?: No   Food Insecurity: Low Risk  (3/12/2025)    Food Insecurity     Within the past 12 months, did you worry that your food would run out before you got money to buy more?: No     Within the past 12 months, did the food you bought just not last and you didn t have money to get more?: No   Transportation Needs: Low Risk  (3/12/2025)    Transportation Needs     Within the past 12 months, has lack of transportation kept you from medical appointments, getting your medicines, non-medical meetings or appointments, work, or from getting things that you need?: No   Recent Concern: Transportation Needs - High Risk (1/27/2025)    Transportation Needs     Within the past 12 months, has lack of transportation kept you from medical appointments, getting your medicines, non-medical meetings or appointments, work, or from getting things that you need?: Yes   Physical Activity:  Insufficiently Active (2023)    Received from [facility]    Exercise Vital Sign     Days of Exercise per Week: 5 days     Minutes of Exercise per Session: 10 min   Stress: No Stress Concern Present (2023)    Received from [facility]    [employer] [place] [employer] - Occupational Stress Questionnaire     Feeling of Stress : Only a little   Social Connections: Feeling Socially Integrated (2023)    Received from [facility]    OASIS : Social Isolation     Frequency of experiencing loneliness or isolation: Never   Interpersonal Safety: Low Risk  (3/12/2025)    Interpersonal Safety     Do you feel physically and emotionally safe where you currently live?: Yes     Within the past 12 months, have you been hit, slapped, kicked or otherwise physically hurt by someone?: No     Within the past 12 months, have you been humiliated or emotionally abused in other ways by your partner or ex-partner?: No   Housing Stability: Low Risk  (3/12/2025)    Housing Stability     Do you have housing? : Yes     Are you worried about losing your housing?: No   Recent Concern: Housing Stability - High Risk (2025)    Housing Stability     Do you have housing? : No     Are you worried about losing your housing?: No     FAMILY MEDICAL HISTORY:   Family History   Problem Relation Age of Onset    Hypertension Mother     Endometrial Cancer Mother     Hyperlipidemia Mother     Prostate Cancer Father     Macular Degeneration Father     Cancer - colorectal Maternal Grandmother         in her 80's, has surgery and removal of part of kidney,  at age 98    Heart Disease Maternal Grandfather          at 98    Glaucoma Maternal Grandfather     Cerebrovascular Disease Paternal Grandmother         in her 80's    Hypertension Paternal Grandmother     Heart Disease Paternal Grandfather         MI    Alzheimer Disease Paternal Grandfather     Allergies Son     Neurologic Disorder Daughter         Migraines    Breast  Cancer Other     Anesthesia Reaction No family hx of     Crohn's Disease No family hx of     Ulcerative Colitis No family hx of     Melanoma No family hx of     Skin Cancer No family hx of      PHYSICAL EXAM:   LMP 06/01/1988 (Approximate)   GENERAL APPEARANCE: alert and no distress  EYES: nonicteric  HENT: mouth without ulcers or lesions  NECK: supple, no adenopathy  RESP: lungs clear to auscultation   CV: regular rhythm, normal rate, no rub  ABDOMEN: soft, nontender, normal bowel sounds, no HSM   Extremities: no clubbing, cyanosis, or edema  MS: no evidence of inflammation in joints, no muscle tenderness  SKIN: no rash  NEURO: mentation intact and speech normal  PSYCH: affect normal/bright   LABS:   Recent Results (from the past 4 weeks)   Influenza A/B, RSV and SARS-CoV2 PCR (COVID-19) Nasopharyngeal    Collection Time: 03/08/25  3:49 PM    Specimen: Nasopharyngeal; Swab   Result Value Ref Range    Influenza A PCR Negative Negative    Influenza B PCR Negative Negative    RSV PCR Negative Negative    SARS CoV2 PCR Negative Negative   Respiratory Panel PCR    Collection Time: 03/08/25  3:49 PM    Specimen: Nasopharyngeal; Swab   Result Value Ref Range    Adenovirus Not Detected Not Detected    Coronavirus Not Detected Not Detected    Human Metapneumovirus Not Detected Not Detected    Human Rhin/Enterovirus Not Detected Not Detected    Influenza A Not Detected Not Detected    Influenza A, H1 Not Detected Not Detected    Influenza A 2009 H1N1 Not Detected Not Detected    Influenza A, H3 Not Detected Not Detected    Influenza B Not Detected Not Detected    Parainfluenza Virus 1 Not Detected Not Detected    Parainfluenza Virus 2 Not Detected Not Detected    Parainfluenza Virus 3 Not Detected Not Detected    Parainfluenza Virus 4 Not Detected Not Detected    Respiratory Syncytial Virus A Not Detected Not Detected    Respiratory Syncytial Virus B Not Detected Not Detected    Chlamydia Pneumoniae Not Detected Not Detected     Mycoplasma Pneumoniae Not Detected Not Detected   Lactic Acid Whole Blood w/ 1x repeat in 2 hrs when >2    Collection Time: 03/08/25  4:06 PM   Result Value Ref Range    Lactic Acid, Initial 2.2 (H) 0.7 - 2.0 mmol/L   Comprehensive metabolic panel    Collection Time: 03/08/25  4:06 PM   Result Value Ref Range    Sodium 138 135 - 145 mmol/L    Potassium 4.4 3.4 - 5.3 mmol/L    Carbon Dioxide (CO2) 24 22 - 29 mmol/L    Anion Gap 11 7 - 15 mmol/L    Urea Nitrogen 51.3 (H) 8.0 - 23.0 mg/dL    Creatinine 1.56 (H) 0.51 - 0.95 mg/dL    GFR Estimate 34 (L) >60 mL/min/1.73m2    Calcium 10.4 8.8 - 10.4 mg/dL    Chloride 103 98 - 107 mmol/L    Glucose 183 (H) 70 - 99 mg/dL    Alkaline Phosphatase 110 40 - 150 U/L    AST 58 (H) 0 - 45 U/L    ALT 61 (H) 0 - 50 U/L    Protein Total 6.5 6.4 - 8.3 g/dL    Albumin 3.5 3.5 - 5.2 g/dL    Bilirubin Total 0.5 <=1.2 mg/dL   Procalcitonin    Collection Time: 03/08/25  4:06 PM   Result Value Ref Range    Procalcitonin 0.11 <0.50 ng/mL   Troponin T, High Sensitivity    Collection Time: 03/08/25  4:06 PM   Result Value Ref Range    Troponin T, High Sensitivity 29 (H) <=14 ng/L   Nt probnp inpatient    Collection Time: 03/08/25  4:06 PM   Result Value Ref Range    N terminal Pro BNP Inpatient 963 (H) 0 - 900 pg/mL   Extra Red Top Tube    Collection Time: 03/08/25  4:06 PM   Result Value Ref Range    Hold Specimen JIC    Extra Purple Top Tube    Collection Time: 03/08/25  4:06 PM   Result Value Ref Range    Hold Specimen JIC    Extra Heparinized Syringe    Collection Time: 03/08/25  4:06 PM   Result Value Ref Range    Hold Specimen JIC    CBC with platelets and differential    Collection Time: 03/08/25  4:06 PM   Result Value Ref Range    WBC Count 18.9 (H) 4.0 - 11.0 10e3/uL    RBC Count 4.00 3.80 - 5.20 10e6/uL    Hemoglobin 10.8 (L) 11.7 - 15.7 g/dL    Hematocrit 34.2 (L) 35.0 - 47.0 %    MCV 86 78 - 100 fL    MCH 27.0 26.5 - 33.0 pg    MCHC 31.6 31.5 - 36.5 g/dL    RDW 17.1 (H) 10.0 -  15.0 %    Platelet Count 346 150 - 450 10e3/uL    % Neutrophils 87 %    % Lymphocytes 5 %    % Monocytes 7 %    % Eosinophils 0 %    % Basophils 0 %    % Immature Granulocytes 1 %    NRBCs per 100 WBC 0 <1 /100    Absolute Neutrophils 16.4 (H) 1.6 - 8.3 10e3/uL    Absolute Lymphocytes 0.9 0.8 - 5.3 10e3/uL    Absolute Monocytes 1.3 0.0 - 1.3 10e3/uL    Absolute Eosinophils 0.0 0.0 - 0.7 10e3/uL    Absolute Basophils 0.0 0.0 - 0.2 10e3/uL    Absolute Immature Granulocytes 0.3 <=0.4 10e3/uL    Absolute NRBCs 0.0 10e3/uL   EKG 12 lead    Collection Time: 03/08/25  4:11 PM   Result Value Ref Range    Systolic Blood Pressure  mmHg    Diastolic Blood Pressure  mmHg    Ventricular Rate 90 BPM    Atrial Rate 90 BPM    AK Interval 190 ms    QRS Duration 76 ms     ms    QTc 430 ms    P Axis 66 degrees    R AXIS 3 degrees    T Axis 34 degrees    Interpretation ECG       Sinus rhythm  Normal ECG    Unconfirmed report - interpretation of this ECG is computer generated - see medical record for final interpretation  Confirmed by - EMERGENCY ROOM, PHYSICIAN ([id number]),  Larry Tadeo ([id number]) on 3/10/2025 11:33:08 AM     Blood Culture Peripheral Blood    Collection Time: 03/08/25  4:23 PM    Specimen: Peripheral Blood   Result Value Ref Range    Culture Positive on the 1st day of incubation (A)     Culture Enterococcus faecalis (AA)        Susceptibility    Enterococcus faecalis - MARYJANE     Ampicillin <=2 Susceptible ug/mL     Gentamicin Synergy* Susceptible Susceptible ug/mL      * No high level gentamicin resistance found - therefore combination therapy with an aminoglycoside may be indicated for serious enterococcal infections such as bacteremia and endocarditis.     Linezolid* 2 Susceptible ug/mL      * This is an appended report.  These results have been appended to a previously final verified report.     Vancomycin 1 Susceptible ug/mL   [facility] Panel    Collection Time: 03/08/25  4:23 PM    Specimen: Peripheral  Blood   Result Value Ref Range    Staphylococcus species Not Detected Not Detected    Staphylococcus aureus Not Detected Not Detected    Staphylococcus epidermidis Not Detected Not Detected    Staphylococcus lugdunensis Not Detected Not Detected    Enterococcus faecalis Detected (A) Not Detected    Enterococcus faecium Not Detected Not Detected    Streptococcus species Not Detected Not Detected    Streptococcus agalactiae Not Detected Not Detected    Streptococcus anginosus group Not Detected Not Detected    Streptococcus pneumoniae Not Detected Not Detected    Streptococcus pyogenes Not Detected Not Detected    Listeria species Not Detected Not Detected   UA with Microscopic reflex to Culture    Collection Time: 03/08/25  5:37 PM    Specimen: Urine, Clean Catch   Result Value Ref Range    Color Urine Orange (A) Colorless, Straw, Light Yellow, Yellow    Appearance Urine Cloudy (A) Clear    Glucose Urine Negative Negative mg/dL    Bilirubin Urine Negative Negative    Ketones Urine Negative Negative mg/dL    Specific Gravity Urine 1.014 1.003 - 1.035    Blood Urine Small (A) Negative    pH Urine 6.0 5.0 - 7.0    Protein Albumin Urine 50 (A) Negative mg/dL    Urobilinogen Urine Normal Normal, 2.0 mg/dL    Nitrite Urine Positive (A) Negative    Leukocyte Esterase Urine Large (A) Negative    Bacteria Urine Moderate (A) None Seen /HPF    WBC Clumps Urine Present (A) None Seen /HPF    RBC Urine 13 (H) <=2 /HPF    WBC Urine >182 (H) <=5 /HPF    Squamous Epithelials Urine 6 (H) <=1 /HPF   Urine Culture    Collection Time: 03/08/25  5:37 PM    Specimen: Urine, Clean Catch   Result Value Ref Range    Culture >100,000 CFU/mL Klebsiella pneumoniae (A)        Susceptibility    Klebsiella pneumoniae - MARYJANE     Fosfomycin 192 Resistant ug/mL     Ampicillin*  Resistant       * Intrinsically Resistant     Ampicillin/ Sulbactam 4 Susceptible ug/mL     Piperacillin/Tazobactam <=4 Susceptible ug/mL     Cefazolin 2 Susceptible ug/mL      Ceftazidime <=0.5 Susceptible ug/mL     Ceftriaxone <=0.25 Susceptible ug/mL     Cefepime <=0.12 Susceptible ug/mL     Gentamicin <=1 Susceptible ug/mL     Ciprofloxacin <=0.06 Susceptible ug/mL     Levofloxacin <=0.12 Susceptible ug/mL     Nitrofurantoin 64 Intermediate ug/mL     Trimethoprim/Sulfamethoxazole <=1/19 Susceptible ug/mL   Lactic acid whole blood    Collection Time: 03/08/25  6:52 PM   Result Value Ref Range    Lactic Acid 1.0 0.7 - 2.0 mmol/L   Troponin T, High Sensitivity    Collection Time: 03/08/25  6:52 PM   Result Value Ref Range    Troponin T, High Sensitivity 30 (H) <=14 ng/L   Comprehensive metabolic panel    Collection Time: 03/08/25  6:52 PM   Result Value Ref Range    Sodium 139 135 - 145 mmol/L    Potassium 4.1 3.4 - 5.3 mmol/L    Carbon Dioxide (CO2) 15 (L) 22 - 29 mmol/L    Anion Gap 19 (H) 7 - 15 mmol/L    Urea Nitrogen 48.1 (H) 8.0 - 23.0 mg/dL    Creatinine 1.49 (H) 0.51 - 0.95 mg/dL    GFR Estimate 36 (L) >60 mL/min/1.73m2    Calcium 8.8 8.8 - 10.4 mg/dL    Chloride 105 98 - 107 mmol/L    Glucose 188 (H) 70 - 99 mg/dL    Alkaline Phosphatase 108 40 - 150 U/L    AST 92 (H) 0 - 45 U/L    ALT 86 (H) 0 - 50 U/L    Protein Total 6.1 (L) 6.4 - 8.3 g/dL    Albumin 3.3 (L) 3.5 - 5.2 g/dL    Bilirubin Total 0.6 <=1.2 mg/dL   MRSA MSSA PCR, Nasal Swab    Collection Time: 03/08/25  6:54 PM    Specimen: Nares, Bilateral; Swab   Result Value Ref Range    MRSA Target DNA Negative Negative    SA Target DNA Negative    Extra Blue Top Tube    Collection Time: 03/08/25  7:21 PM   Result Value Ref Range    Hold Specimen C    Blood Culture Hand, Right    Collection Time: 03/08/25 11:58 PM    Specimen: Hand, Right; Blood   Result Value Ref Range    Culture No Growth    EKG 12-lead, complete    Collection Time: 03/09/25  7:50 AM   Result Value Ref Range    Systolic Blood Pressure  mmHg    Diastolic Blood Pressure  mmHg    Ventricular Rate 79 BPM    Atrial Rate 79 BPM    AK Interval 200 ms    QRS  Duration 82 ms     ms    QTc 451 ms    P Axis 38 degrees    R AXIS 6 degrees    T Axis 42 degrees    Interpretation ECG       Sinus rhythm  Poor R-wave progression ; consider septal infarct, lead placement, or normal variant  Abnormal ECG    Unconfirmed report - interpretation of this ECG is computer generated - see medical record for final interpretation  Confirmed by - EMERGENCY ROOM, PHYSICIAN ([id number]),  Larry Tadeo ([id number]) on 3/10/2025 11:33:46 AM     CBC with platelets    Collection Time: 03/09/25 10:43 AM   Result Value Ref Range    WBC Count 15.1 (H) 4.0 - 11.0 10e3/uL    RBC Count 3.86 3.80 - 5.20 10e6/uL    Hemoglobin 10.5 (L) 11.7 - 15.7 g/dL    Hematocrit 34.2 (L) 35.0 - 47.0 %    MCV 89 78 - 100 fL    MCH 27.2 26.5 - 33.0 pg    MCHC 30.7 (L) 31.5 - 36.5 g/dL    RDW 17.2 (H) 10.0 - 15.0 %    Platelet Count 323 150 - 450 10e3/uL   Comprehensive metabolic panel    Collection Time: 03/09/25 10:43 AM   Result Value Ref Range    Sodium 141 135 - 145 mmol/L    Potassium 4.0 3.4 - 5.3 mmol/L    Carbon Dioxide (CO2) 20 (L) 22 - 29 mmol/L    Anion Gap 13 7 - 15 mmol/L    Urea Nitrogen 38.5 (H) 8.0 - 23.0 mg/dL    Creatinine 1.34 (H) 0.51 - 0.95 mg/dL    GFR Estimate 41 (L) >60 mL/min/1.73m2    Calcium 8.9 8.8 - 10.4 mg/dL    Chloride 108 (H) 98 - 107 mmol/L    Glucose 136 (H) 70 - 99 mg/dL    Alkaline Phosphatase 102 40 - 150 U/L    AST 46 (H) 0 - 45 U/L    ALT 84 (H) 0 - 50 U/L    Protein Total 6.1 (L) 6.4 - 8.3 g/dL    Albumin 3.2 (L) 3.5 - 5.2 g/dL    Bilirubin Total 0.4 <=1.2 mg/dL   Magnesium    Collection Time: 03/09/25 10:43 AM   Result Value Ref Range    Magnesium 4.0 (H) 1.7 - 2.3 mg/dL   Phosphorus    Collection Time: 03/09/25 10:43 AM   Result Value Ref Range    Phosphorus 2.8 2.5 - 4.5 mg/dL   Blood Culture Hand, Right    Collection Time: 03/09/25 12:58 PM    Specimen: Hand, Right; Blood   Result Value Ref Range    Culture No Growth    Blood Culture Hand, Left    Collection Time: 03/09/25   1:03 PM    Specimen: Hand, Left; Blood   Result Value Ref Range    Culture No Growth    Glucose by meter    Collection Time: 03/09/25  5:20 PM   Result Value Ref Range    GLUCOSE BY METER POCT 217 (H) 70 - 99 mg/dL   Comprehensive metabolic panel    Collection Time: 03/10/25  6:14 AM   Result Value Ref Range    Sodium 141 135 - 145 mmol/L    Potassium 3.9 3.4 - 5.3 mmol/L    Carbon Dioxide (CO2) 21 (L) 22 - 29 mmol/L    Anion Gap 11 7 - 15 mmol/L    Urea Nitrogen 39.0 (H) 8.0 - 23.0 mg/dL    Creatinine 1.43 (H) 0.51 - 0.95 mg/dL    GFR Estimate 38 (L) >60 mL/min/1.73m2    Calcium 9.0 8.8 - 10.4 mg/dL    Chloride 109 (H) 98 - 107 mmol/L    Glucose 106 (H) 70 - 99 mg/dL    Alkaline Phosphatase 97 40 - 150 U/L    AST 37 0 - 45 U/L    ALT 87 (H) 0 - 50 U/L    Protein Total 5.7 (L) 6.4 - 8.3 g/dL    Albumin 3.0 (L) 3.5 - 5.2 g/dL    Bilirubin Total 0.2 <=1.2 mg/dL   CBC with platelets    Collection Time: 03/10/25  6:14 AM   Result Value Ref Range    WBC Count 8.9 4.0 - 11.0 10e3/uL    RBC Count 3.53 (L) 3.80 - 5.20 10e6/uL    Hemoglobin 9.4 (L) 11.7 - 15.7 g/dL    Hematocrit 31.0 (L) 35.0 - 47.0 %    MCV 88 78 - 100 fL    MCH 26.6 26.5 - 33.0 pg    MCHC 30.3 (L) 31.5 - 36.5 g/dL    RDW 17.2 (H) 10.0 - 15.0 %    Platelet Count 305 150 - 450 10e3/uL   Vancomycin level    Collection Time: 03/10/25  6:14 AM   Result Value Ref Range    Vancomycin 19.7   ug/mL   Coccidioides Agn Quant EIA Blood    Collection Time: 03/10/25  6:14 AM    Specimen: Line, venous; Blood   Result Value Ref Range    See Scanned Result COCCIDIOIDES AGN QUANT EIA BLOOD-Scanned    EKG 12-lead, tracing only    Collection Time: 03/10/25  8:51 AM   Result Value Ref Range    Systolic Blood Pressure  mmHg    Diastolic Blood Pressure  mmHg    Ventricular Rate 60 BPM    Atrial Rate 60 BPM    VT Interval 214 ms    QRS Duration 86 ms     ms    QTc 410 ms    P Axis 58 degrees    R AXIS -2 degrees    T Axis 33 degrees    Interpretation ECG       Sinus rhythm  with 1st degree A-V block  Otherwise normal ECG    Unconfirmed report - interpretation of this ECG is computer generated - see medical record for final interpretation  Confirmed by - EMERGENCY ROOM, PHYSICIAN ([id number]),  Larry Tadeo ([id number]) on 3/10/2025 11:38:04 AM     Echocardiogram Complete    Collection Time: 03/10/25  9:40 AM   Result Value Ref Range    LVEF  60-65%    Glucose by meter    Collection Time: 03/10/25  5:03 PM   Result Value Ref Range    GLUCOSE BY METER POCT 227 (H) 70 - 99 mg/dL   Glucose by meter    Collection Time: 03/10/25 11:19 PM   Result Value Ref Range    GLUCOSE BY METER POCT 148 (H) 70 - 99 mg/dL   CBC with platelets    Collection Time: 03/11/25  9:23 AM   Result Value Ref Range    WBC Count 7.1 4.0 - 11.0 10e3/uL    RBC Count 3.65 (L) 3.80 - 5.20 10e6/uL    Hemoglobin 9.8 (L) 11.7 - 15.7 g/dL    Hematocrit 32.7 (L) 35.0 - 47.0 %    MCV 90 78 - 100 fL    MCH 26.8 26.5 - 33.0 pg    MCHC 30.0 (L) 31.5 - 36.5 g/dL    RDW 16.9 (H) 10.0 - 15.0 %    Platelet Count 305 150 - 450 10e3/uL   Extra Green Top (Lithium Heparin) Tube    Collection Time: 03/11/25  9:23 AM   Result Value Ref Range    Hold Specimen [facility]    Basic metabolic panel    Collection Time: 03/11/25  9:23 AM   Result Value Ref Range    Sodium 144 135 - 145 mmol/L    Potassium 4.4 3.4 - 5.3 mmol/L    Chloride 112 (H) 98 - 107 mmol/L    Carbon Dioxide (CO2) 22 22 - 29 mmol/L    Anion Gap 10 7 - 15 mmol/L    Urea Nitrogen 35.0 (H) 8.0 - 23.0 mg/dL    Creatinine 1.24 (H) 0.51 - 0.95 mg/dL    GFR Estimate 45 (L) >60 mL/min/1.73m2    Calcium 8.9 8.8 - 10.4 mg/dL    Glucose 102 (H) 70 - 99 mg/dL   Vancomycin level    Collection Time: 03/11/25 11:12 AM   Result Value Ref Range    Vancomycin 19.4   ug/mL   Creatinine    Collection Time: 03/11/25 11:12 AM   Result Value Ref Range    Creatinine 1.19 (H) 0.51 - 0.95 mg/dL    GFR Estimate 47 (L) >60 mL/min/1.73m2   CRP inflammation    Collection Time: 03/11/25 11:12 AM   Result Value Ref  Range    CRP Inflammation 18.10 (H) <5.00 mg/L   Glucose by meter    Collection Time: 03/11/25  4:17 PM   Result Value Ref Range    GLUCOSE BY METER POCT 141 (H) 70 - 99 mg/dL   Glucose by meter    Collection Time: 03/11/25  7:12 PM   Result Value Ref Range    GLUCOSE BY METER POCT 155 (H) 70 - 99 mg/dL   Glucose by meter    Collection Time: 03/11/25  9:29 PM   Result Value Ref Range    GLUCOSE BY METER POCT 159 (H) 70 - 99 mg/dL   Glucose by meter    Collection Time: 03/12/25  2:57 PM   Result Value Ref Range    GLUCOSE BY METER POCT 94 70 - 99 mg/dL   Glucose by meter    Collection Time: 03/12/25 11:04 PM   Result Value Ref Range    GLUCOSE BY METER POCT 164 (H) 70 - 99 mg/dL   Glucose by meter    Collection Time: 03/13/25  9:25 AM   Result Value Ref Range    GLUCOSE BY METER POCT 91 70 - 99 mg/dL   Vancomycin level    Collection Time: 03/13/25 11:30 AM   Result Value Ref Range    Vancomycin 14.2   ug/mL   Basic metabolic panel    Collection Time: 03/13/25 11:30 AM   Result Value Ref Range    Sodium 144 135 - 145 mmol/L    Potassium 4.6 3.4 - 5.3 mmol/L    Chloride 109 (H) 98 - 107 mmol/L    Carbon Dioxide (CO2) 22 22 - 29 mmol/L    Anion Gap 13 7 - 15 mmol/L    Urea Nitrogen 33.3 (H) 8.0 - 23.0 mg/dL    Creatinine 1.30 (H) 0.51 - 0.95 mg/dL    GFR Estimate 43 (L) >60 mL/min/1.73m2    Calcium 9.0 8.8 - 10.4 mg/dL    Glucose 105 (H) 70 - 99 mg/dL   Glucose by meter    Collection Time: 03/13/25  5:01 PM   Result Value Ref Range    GLUCOSE BY METER POCT 140 (H) 70 - 99 mg/dL   Glucose by meter    Collection Time: 03/13/25  8:57 PM   Result Value Ref Range    GLUCOSE BY METER POCT 170 (H) 70 - 99 mg/dL   Glucose by meter    Collection Time: 03/14/25  2:14 AM   Result Value Ref Range    GLUCOSE BY METER POCT 105 (H) 70 - 99 mg/dL   Glucose by meter    Collection Time: 03/14/25  8:07 AM   Result Value Ref Range    GLUCOSE BY METER POCT 89 70 - 99 mg/dL   Cardiac Device Check - Remote    Collection Time: 03/17/25  9:45  AM   Result Value Ref Range    Date Time Interrogation Session [id number]     Implantable Pulse Generator  [employer]     Implantable Pulse Generator Model LNQ11 Reveal LINQ     Implantable Pulse Generator Serial Number [id number]     Type Interrogation Session Remote     [facility] Name [facility]     Implantable Pulse Generator Type Implantable Diagnostic Monitor     Implantable Pulse Generator Implant Date [id number]    Vancomycin level    Collection Time: 03/19/25 12:30 PM   Result Value Ref Range    Vancomycin 25.9 (HH)   ug/mL   Basic metabolic panel    Collection Time: 03/19/25 12:30 PM   Result Value Ref Range    Sodium 141 135 - 145 mmol/L    Potassium 4.0 3.4 - 5.3 mmol/L    Chloride 105 98 - 107 mmol/L    Carbon Dioxide (CO2) 23 22 - 29 mmol/L    Anion Gap 13 7 - 15 mmol/L    Urea Nitrogen 33.4 (H) 8.0 - 23.0 mg/dL    Creatinine 1.59 (H) 0.51 - 0.95 mg/dL    GFR Estimate 34 (L) >60 mL/min/1.73m2    Calcium 9.2 8.8 - 10.4 mg/dL    Glucose 69 (L) 70 - 99 mg/dL   CBC with platelets and differential    Collection Time: 03/19/25 12:30 PM   Result Value Ref Range    WBC Count 7.2 4.0 - 11.0 10e3/uL    RBC Count 3.63 (L) 3.80 - 5.20 10e6/uL    Hemoglobin 9.7 (L) 11.7 - 15.7 g/dL    Hematocrit 31.9 (L) 35.0 - 47.0 %    MCV 88 78 - 100 fL    MCH 26.7 26.5 - 33.0 pg    MCHC 30.4 (L) 31.5 - 36.5 g/dL    RDW 17.2 (H) 10.0 - 15.0 %    Platelet Count 400 150 - 450 10e3/uL    % Neutrophils 53 %    % Lymphocytes 30 %    % Monocytes 12 %    % Eosinophils 2 %    % Basophils 1 %    % Immature Granulocytes 3 %    NRBCs per 100 WBC 0 <1 /100    Absolute Neutrophils 3.8 1.6 - 8.3 10e3/uL    Absolute Lymphocytes 2.2 0.8 - 5.3 10e3/uL    Absolute Monocytes 0.8 0.0 - 1.3 10e3/uL    Absolute Eosinophils 0.1 0.0 - 0.7 10e3/uL    Absolute Basophils 0.1 0.0 - 0.2 10e3/uL    Absolute Immature Granulocytes 0.2 <=0.4 10e3/uL    Absolute NRBCs 0.0 10e3/uL   Vancomycin level    Collection Time: 03/21/25  5:09 PM   Result Value Ref  Range    Vancomycin 19.8   ug/mL     CMP  Recent Labs   Lab Test 03/19/25  1230 03/14/25  0807 03/14/25  0214 03/13/25  2057 03/13/25  1701 03/13/25  1130 03/11/25  1617 03/11/25  1112 03/11/25  0923 03/10/25  1703 03/10/25  0614 03/09/25  1720 03/09/25  1043 03/08/25  1852 03/08/25  1606 12/10/24  1446 11/14/24  1506 11/09/21  1100 09/18/20  0000 08/30/19  0657 08/29/19  0544 08/28/19  0528 08/26/19  2331 08/05/19  1552 05/14/18  1019 04/17/18  0942 09/26/17  0916 09/18/17  0921 07/13/17  0843 05/10/17  0929     --   --   --   --  144  --   --  144  --  141  --  141 139 138   < > 140   < > 141 140 138 139   < > 139   < > 138   < > 137   < > 138   POTASSIUM 4.0  --   --   --   --  4.6  --   --  4.4  --  3.9  --  4.0 4.1 4.4   < > 4.4   < > 4.4 4.4 4.5 4.3   < > 2.9*   < > 3.9   < > 5.0   < > 4.2   CHLORIDE 105  --   --   --   --  109*  --   --  112*  --  109*  --  108* 105 103   < > 107   < > 105 109 110* 108   < > 99   < > 102   < > 99   < > 102   CO2 23  --   --   --   --  22  --   --  22  --  21*  --  20* 15* 24   < > 21*   < > 29 25 21 22   < > 30   < > 28   < > 32   < > 26   ANIONGAP 13  --   --   --   --  13  --   --  10  --  11  --  13 19* 11   < > 12   < >  --  6 8 9   < > 10   < > 8   < > 6   < > 10   GLC 69* 89 105* 170*   < > 105*   < >  --  102*   < > 106*   < > 136* 188* 183*   < > 136*   < > 151* 120* 155* 165*   < > 269*   < > 125*   < > 108*   < > 104*   BUN 33.4*  --   --   --   --  33.3*  --   --  35.0*  --  39.0*  --  38.5* 48.1* 51.3*   < > 42.1*   < > 19 37* 43* 43*   < > 36*   < > 36*   < > 36*   < > 36*   CR 1.59*  --   --   --   --  1.30*  --  1.19* 1.24*  --  1.43*  --  1.34* 1.49* 1.56*   < > 1.56*   < > 1.3 1.53* 1.62* 1.85*   < > 1.41*   < > 1.74*   < > 1.26*   < > 1.53*   GFRESTIMATED 34*  --   --   --   --  43*  --  47* 45*  --  38*  --  41* 36* 34*   < > 34*   < > 44.49 34* 32* 27*   < > 38*   < > 29*   < > 42*   < > 34*   GFRESTBLACK  --   --   --   --   --   --   --   --   --    --   --   --   --   --   --   --   --   --  53.84 39* 37* 31*   < > 44*   < > 35*   < > 51*   < > 41*   KEILY 9.2  --   --   --   --  9.0  --   --  8.9  --  9.0  --  8.9 8.8 10.4   < > 9.6   < > 8.8 8.8 8.8 7.7*   < > 8.5   < > 9.1   < > 9.6   < > 9.7   MAG  --   --   --   --   --   --   --   --   --   --   --   --  4.0*  --   --   --   --   --   --   --   --   --   --  2.2  --  2.8*  --  2.2   < > 2.8*   PHOS  --   --   --   --   --   --   --   --   --   --   --   --  2.8  --   --   --  3.8  --   --   --   --   --   --  3.3  --   --   --   --   --  3.1   PROTTOTAL  --   --   --   --   --   --   --   --   --   --  5.7*  --  6.1* 6.1* 6.5   < > 7.0   < > 6.4 6.4* 6.6* 6.2*   < > 7.5   < > 7.7   < > 7.9   < > 8.1   ALBUMIN  --   --   --   --   --   --   --   --   --   --  3.0*  --  3.2* 3.3* 3.5   < > 3.9   < > 3.5 2.3* 2.4* 2.0*   < > 3.0*   < > 3.3*   < > 3.1*   < > 3.7   BILITOTAL  --   --   --   --   --   --   --   --   --   --  0.2  --  0.4 0.6 0.5   < > 0.4   < > 0.3 0.2 0.2 0.2   < > 0.3   < > 0.3   < > 0.4   < > 0.2   ALKPHOS  --   --   --   --   --   --   --   --   --   --  97  --  102 108 110   < > 126   < > 152 202* 218* 207*   < > 213*   < > 211*   < > 248*   < > 202*   AST  --   --   --   --   --   --   --   --   --   --  37  --  46* 92* 58*   < > 22   < > 55 20 41 69*   < > 25   < > 26   < > 58*   < > 19   ALT  --   --   --   --   --   --   --   --   --   --  87*  --  84* 86* 61*   < > 27   < > 78 72* 94* 112*   < > 40   < > 42   < > 150*   < > 56*    < > = values in this interval not displayed.     CBC  Recent Labs   Lab Test 03/19/25  1230 03/11/25  0923 03/10/25  0614 03/09/25  1043   HGB 9.7* 9.8* 9.4* 10.5*   WBC 7.2 7.1 8.9 15.1*   RBC 3.63* 3.65* 3.53* 3.86   HCT 31.9* 32.7* 31.0* 34.2*   MCV 88 90 88 89   MCH 26.7 26.8 26.6 27.2   MCHC 30.4* 30.0* 30.3* 30.7*   RDW 17.2* 16.9* 17.2* 17.2*    305 305 323     INR  Recent Labs   Lab Test 01/04/25  1537 01/04/25  0742 01/03/25  1736  08/26/19  2331 05/18/18  0731   INR  --   --  1.23* 1.33* 1.06   PTT 31 92*  --  38* 33     ABGNo lab results found.   URINE STUDIES  Recent Labs   Lab Test 03/08/25  1737 02/07/25  0215 01/22/25  1727 01/21/25  1422 12/10/24  1739 11/06/24  1048 09/22/24  0233 08/20/24  1454 08/27/19  0224 08/05/19  1552   COLOR Orange* Yellow Yellow Light Yellow   < > Yellow   < > Yellow   < > Yellow   APPEARANCE Cloudy* Clear Slightly Cloudy* Slightly Cloudy*   < > Clear   < > Clear   < > Clear   URINEGLC Negative Negative 30* Negative   < > Negative   < > 100*   < > 100*   URINEBILI Negative Negative Negative Negative   < > Negative   < > Negative   < > Negative   URINEKETONE Negative Negative Negative Negative   < > Negative   < > Negative   < > Negative   SG 1.014 1.025 1.029 1.012   < > 1.015   < > 1.025   < > 1.015   UBLD Small* Negative Small* Small*   < > Negative   < > Trace*   < > Trace*   URINEPH 6.0 5.5 5.5 5.5   < > 5.5   < > 5.5   < > 5.5   PROTEIN 50* 100* 30* 30*   < > 30*   < > 100*   < > >=300*   UROBILINOGEN  --  0.2  --   --   --  0.2  --  0.2  --  0.2   NITRITE Positive* Negative Negative Negative   < > Negative   < > Positive*   < > Negative   LEUKEST Large* Small* Large* Large*   < > Trace*   < > Small*   < > Negative   RBCU 13* 0-2 4* 6*   < >  --    < >  --    < > O - 2   WBCU >182* 10-25* >182* >182*   < >  --    < >  --    < > 0 - 5    < > = values in this interval not displayed.     Recent Labs   Lab Test 08/05/19  1552 10/16/18  0924   UTPG 5.84* 1.22*       ASSESSMENT AND PLAN:   #CKD stage 3b with moderate proteinuria secondary to many factors: severe and recurrent AKIs, cardiorenal syndrome, diabetes, immunosuppressive drugs toxicity and decline related to age. Recent imaging shows bilateral cortical atrophy. Quantification of the proteinuria last dates to 2022 and therefore will repeat it. Risk of progression is related to BP, glycemia control and further episodes of DERREK. The patient was instructed  to keep a BP and glycemia diary and to communicate the results. She should ideally be on SGLT2 inh but her history of recurrent UTIs precludes this. She used to be in the past on lisinopril then losartan but they were both discontinued due to DERREK/hyperkalemia and could try cautiously to reintroduce them in the future     #HTN  Primary and secondary to CKD. Management as per above.   Current medications: amlodipine, hydralazine and carvedilol    #Type 2 DM   Hba1c in December 2024 is 6.9 and unreliable in the presence of anemia and CKD  Current medications: linagliptin only. Ideally a second agent should be introduced but the patient refused and wants to try dietary measures first    #CVD/dyslipidemia  On evolocumab, ezetimibe and omega 3    #Liver transplant followed by Dr Ramirez and on sirolimus and prednisone. Proteinuria to be quantified     #Blood count  Hemoglobin 9.7 to be remeasured once inflammation has completely subsided  Patient is on oral iron    #Acid-base status  CO2 level 23  No need for sodium bicarbonate supplementation    #Electrolytes  Na 141  K 4  No acute issues    #BMD  Calcium   9.2        Phosphorus 2.8   Albumin 3   Will check a Vitamin D level as it hasn't been measured since 2022    #CKD journey/transplant not a candidate yet    The total time of this encounter amounted to 60 minutes on the day of the encounter. This time included time spent with the patient, reviewing records, ordering tests, and performing post visit documentation.     The patient will return to follow up on 5/28    Maria Ines Otero MD  Division of Renal Disease and Hypertension  April 2, 2025  2:05 PM

## 2025-04-02 NOTE — TELEPHONE ENCOUNTER
[facility] Call Center    Phone Message    May a detailed message be left on voicemail: yes     Reason for Call: Other: Pt has been having some cardiac issues. Low heart rate etc. Pt was also not able to get labs for appt done. However she is very worried about rescheduling to next available in November. Pt requested a call from nursing to see what they think she should do if she should come to appt. Or reschedule. Thanks      Action Taken: Other: Neph    Travel Screening: Not Applicable     Date of Service:

## 2025-04-02 NOTE — TELEPHONE ENCOUNTER
Kirill Zuluaga MD  You1 hour ago (11:35 AM)     I never recommend loop recorder removal as it always gives us information that could be useful.    As stated that carvedilol would be better for her A-fib and would like to see how it responds.  She does not need a pacemaker.     =====================================================    Patient called to inform of above message from Dr. Zuluaga. Patient appreciates his input and verbalizes understanding.

## 2025-04-02 NOTE — TELEPHONE ENCOUNTER
Called patient to discuss appointment today. Patient isn't feeling well with her current A-fob; experiencing a current episode of A-fib and doesn't want to drive.     Writer explained that we don't have labs and would verify with provider if okay to switch to virtual. Pt verbalized understanding.     Discussed with provider; states that patient is post hospital and would prefer patient in person due to complexity of her case and recent [facility] stay.     Called to let patient know; no answer.

## 2025-04-02 NOTE — TELEPHONE ENCOUNTER
"Patient called back, discussed finding someone to bring her in today. Patient states she doesn't have anyone to help drive her to her appts today. Writer offered to switch to virtual. Patient did agree to this. Visit changed. Also discussed getting her labs done. After a lengthy discussion of patient going around the same issue \" I don't have time to get my labs done or I can't get them done until the 14th\". Patient finally agreed to get labs done and mentioned in passing that homecare is coming to do labs for infectious disease. Writer informed her that we could add the labs on there. Patient agreed to this.    [facility]- spoke with Rylie RN who took verbal order for lab orders   Cbc, renal panel, vit d deficiency, ferritin, iron binding, UACR, UAPR.     Faxed orders to   "

## 2025-04-02 NOTE — PATIENT INSTRUCTIONS
It was a pleasure taking care of you today.  I've included a brief summary of our discussion and care plan from today's visit below.  Please review this information with your primary care provider.    My recommendations are summarized as follows:  -Keep the amount of sodium in your diet at 2.3 g/day (also see instructions attached in that regard)  -Keep a Blood Pressure and glucose diary by taking your blood pressure twice a day as instructed.Please make sure that you are using a validated blood pressure device by checking that it is the case at: https://www.validatebp.org/  -Avoid all NSAID's. Examples include Ibuprofen (Advil, Motrin), naprosyn (Aleve), celebrex among others. Acetaminophen (Tylenol) is ok with maximum dose in 24 hours of 3200 mg.    - Return to Nephrology Clinic on May 28th to review your progress.     To schedule imaging please call [phone]. To schedule your lab appointment at [address] lab call [phone]    Who do I call with any questions after my visit?  Please be in touch if there are any further questions that arise following today's visit.  There are multiple ways to contact your nephrology care team.      During business hours, you may reach your Nephrology RN Care Coordinator, Patricia Villegas at . To schedule or reschedule an appointment, please call [phone].    You can always send a secure message through [employer].  [employer] messages are answered by your nurse or doctor typically within 24 hours.  Please allow extra time on weekends and holidays.      For urgent/emergent questions after business hours, you may reach the on-call Nephrology Fellow by contacting the [facility]  at [phone].     How will I get the results of any tests ordered?    You will receive all of your results.  If you have signed up for [employer], any tests ordered at your visit will be available to you after your physician reviews them.  Typically this  takes 1-2 weeks.  If there are urgent results that require a change in your care plan, your physician or nurse will call you to discuss the next steps.      What is [employer]?  [employer] is a secure way for you to access all of your healthcare records from the [facility].  It is a web based computer program, so you can sign on to it from any location.  It also allows you to send secure messages to your care team.  I recommend signing up for [employer] access if you have not already done so and are comfortable with using a computer.      How do I schedule a follow-up visit?  If you did not schedule a follow-up visit today, please call [phone] to schedule a follow-up office visit.      Sincerely,    Maria Ines Otero MD  [facility]  Division of Nephrology and Hypertension

## 2025-04-02 NOTE — TELEPHONE ENCOUNTER
[facility] Call Center    Phone Message    May a detailed message be left on voicemail: yes     Reason for Call: Order(s): Home Care Orders: Skilled Nursing: needs lab draw orders    Action Taken: Message routed to:  [facility] ID    Travel Screening: Not Applicable     Date of Service: 04/02/25

## 2025-04-02 NOTE — TELEPHONE ENCOUNTER
Transmission received.  7 AF episodes logged with 4 available EGM's. EGM's show AF with first episode starting 1/1/2025 at 13:58  and last episode at 1/2/2025 @ 00:52. Episodes lasting 6 min-1 hour 14min.  Rates ranged from  bpm  Her symptoms started earlier in the morning although I do not see that she was in afib in the am but she was still having symptoms yesterday afternoon.   On Eliquis  Will update Dr Zuluaga and Team  Viviana      Presenting rhythm

## 2025-04-04 ENCOUNTER — MYC MEDICAL ADVICE (OUTPATIENT)
Dept: INFECTIOUS DISEASES | Facility: CLINIC | Age: 75
End: 2025-04-04

## 2025-04-04 DIAGNOSIS — N39.0 URINARY TRACT INFECTION: ICD-10-CM

## 2025-04-07 ENCOUNTER — TELEPHONE (OUTPATIENT)
Dept: CARDIOLOGY | Facility: CLINIC | Age: 75
End: 2025-04-07

## 2025-04-07 ENCOUNTER — LAB REQUISITION (OUTPATIENT)
Dept: LAB | Facility: CLINIC | Age: 75
End: 2025-04-07
Payer: MEDICARE

## 2025-04-07 DIAGNOSIS — D64.9 ANEMIA, UNSPECIFIED: ICD-10-CM

## 2025-04-07 DIAGNOSIS — I10 BENIGN ESSENTIAL HYPERTENSION: ICD-10-CM

## 2025-04-07 DIAGNOSIS — E55.9 VITAMIN D DEFICIENCY, UNSPECIFIED: ICD-10-CM

## 2025-04-07 DIAGNOSIS — N18.32 CHRONIC KIDNEY DISEASE, STAGE 3B (H): ICD-10-CM

## 2025-04-07 LAB
ALBUMIN SERPL BCG-MCNC: 3.5 G/DL (ref 3.5–5.2)
ALBUMIN UR-MCNC: 10 MG/DL
ALT SERPL W P-5'-P-CCNC: 40 U/L (ref 0–50)
ANION GAP SERPL CALCULATED.3IONS-SCNC: 13 MMOL/L (ref 7–15)
APPEARANCE UR: ABNORMAL
BACTERIA #/AREA URNS HPF: ABNORMAL /HPF
BASOPHILS # BLD AUTO: 0 10E3/UL (ref 0–0.2)
BASOPHILS NFR BLD AUTO: 0 %
BILIRUB UR QL STRIP: NEGATIVE
BUN SERPL-MCNC: 33.1 MG/DL (ref 8–23)
CALCIUM SERPL-MCNC: 8.4 MG/DL (ref 8.8–10.4)
CHLORIDE SERPL-SCNC: 105 MMOL/L (ref 98–107)
COLOR UR AUTO: ABNORMAL
CREAT SERPL-MCNC: 1.83 MG/DL (ref 0.51–0.95)
CRP SERPL-MCNC: <3 MG/L
EGFRCR SERPLBLD CKD-EPI 2021: 28 ML/MIN/1.73M2
EOSINOPHIL # BLD AUTO: 0 10E3/UL (ref 0–0.7)
EOSINOPHIL NFR BLD AUTO: 0 %
ERYTHROCYTE [DISTWIDTH] IN BLOOD BY AUTOMATED COUNT: 18.3 % (ref 10–15)
FERRITIN SERPL-MCNC: 44 NG/ML (ref 11–328)
GLUCOSE SERPL-MCNC: 137 MG/DL (ref 70–99)
GLUCOSE UR STRIP-MCNC: NEGATIVE MG/DL
HCO3 SERPL-SCNC: 21 MMOL/L (ref 22–29)
HCT VFR BLD AUTO: 30 % (ref 35–47)
HGB BLD-MCNC: 9.1 G/DL (ref 11.7–15.7)
HGB UR QL STRIP: ABNORMAL
HOLD SPECIMEN: NORMAL
IMM GRANULOCYTES # BLD: 0.1 10E3/UL
IMM GRANULOCYTES NFR BLD: 2 %
IRON BINDING CAPACITY (ROCHE): 277 UG/DL (ref 240–430)
IRON SATN MFR SERPL: 13 % (ref 15–46)
IRON SERPL-MCNC: 36 UG/DL (ref 37–145)
KETONES UR STRIP-MCNC: NEGATIVE MG/DL
LEUKOCYTE ESTERASE UR QL STRIP: ABNORMAL
LYMPHOCYTES # BLD AUTO: 1.1 10E3/UL (ref 0.8–5.3)
LYMPHOCYTES NFR BLD AUTO: 17 %
MCH RBC QN AUTO: 26.5 PG (ref 26.5–33)
MCHC RBC AUTO-ENTMCNC: 30.3 G/DL (ref 31.5–36.5)
MCV RBC AUTO: 88 FL (ref 78–100)
MONOCYTES # BLD AUTO: 0.4 10E3/UL (ref 0–1.3)
MONOCYTES NFR BLD AUTO: 7 %
MUCOUS THREADS #/AREA URNS LPF: PRESENT /LPF
NEUTROPHILS # BLD AUTO: 4.8 10E3/UL (ref 1.6–8.3)
NEUTROPHILS NFR BLD AUTO: 75 %
NITRATE UR QL: NEGATIVE
NRBC # BLD AUTO: 0 10E3/UL
NRBC BLD AUTO-RTO: 0 /100
PH UR STRIP: 5.5 [PH] (ref 5–7)
PHOSPHATE SERPL-MCNC: 3.7 MG/DL (ref 2.5–4.5)
PLATELET # BLD AUTO: 351 10E3/UL (ref 150–450)
POTASSIUM SERPL-SCNC: 4 MMOL/L (ref 3.4–5.3)
RBC # BLD AUTO: 3.43 10E6/UL (ref 3.8–5.2)
RBC URINE: 2 /HPF
SODIUM SERPL-SCNC: 139 MMOL/L (ref 135–145)
SP GR UR STRIP: 1.01 (ref 1–1.03)
SQUAMOUS EPITHELIAL: 3 /HPF
UROBILINOGEN UR STRIP-MCNC: NORMAL MG/DL
WBC # BLD AUTO: 6.5 10E3/UL (ref 4–11)
WBC CLUMPS #/AREA URNS HPF: PRESENT /HPF
WBC URINE: >182 /HPF

## 2025-04-07 PROCEDURE — 86140 C-REACTIVE PROTEIN: CPT | Mod: ORL | Performed by: INTERNAL MEDICINE

## 2025-04-07 PROCEDURE — 81001 URINALYSIS AUTO W/SCOPE: CPT | Mod: ORL | Performed by: INTERNAL MEDICINE

## 2025-04-07 PROCEDURE — 83550 IRON BINDING TEST: CPT | Mod: ORL | Performed by: INTERNAL MEDICINE

## 2025-04-07 PROCEDURE — 83540 ASSAY OF IRON: CPT | Mod: ORL | Performed by: INTERNAL MEDICINE

## 2025-04-07 PROCEDURE — 84460 ALANINE AMINO (ALT) (SGPT): CPT | Mod: ORL | Performed by: INTERNAL MEDICINE

## 2025-04-07 PROCEDURE — 85025 COMPLETE CBC W/AUTO DIFF WBC: CPT | Mod: ORL | Performed by: INTERNAL MEDICINE

## 2025-04-07 PROCEDURE — 80069 RENAL FUNCTION PANEL: CPT | Mod: ORL | Performed by: INTERNAL MEDICINE

## 2025-04-07 PROCEDURE — 82728 ASSAY OF FERRITIN: CPT | Mod: ORL | Performed by: INTERNAL MEDICINE

## 2025-04-07 RX ORDER — HYDRALAZINE HYDROCHLORIDE 50 MG/1
50 TABLET, FILM COATED ORAL 3 TIMES DAILY
Qty: 270 TABLET | Refills: 3 | Status: SHIPPED | OUTPATIENT
Start: 2025-04-07

## 2025-04-07 NOTE — TELEPHONE ENCOUNTER
Kirill Zuluaga MD Hiljus, Audrey G RN  Caller: Unspecified (Today,  9:14 AM)   We could try Bystolic as it has less beta-blocker side effects and is a more effective blood pressure medicine than metoprolol.  Try 10 mg daily.  I agree with holding hydralazine in the setting of diarrhea with a blood pressure of 120.  Without diarrhea I probably would only hold it for under 110.  The diarrhea causing intravascular volume depletion potentiates all vasodilating antihypertensives.      Spoke to patient, reviewed message from Dr Zuluaga. She declines to add a new medication at this time, states she is too overwhelmed and does not want to add a new drug to the mix. She would like to go back to the metoprolol until she has her follow up visit. Dr Zuluaga notified. She will follow the hydralazine hold parameters as outlined above. Med list updated.

## 2025-04-07 NOTE — TELEPHONE ENCOUNTER
Call to [place] to discuss diarrhea. Pt states diarrhea has subsided. Pt states she will call cardiology because she thinks it may be connected to recent medication change.  Pt appreciative of call.    Jazz Bush RN

## 2025-04-07 NOTE — TELEPHONE ENCOUNTER
"Voicemail received from patient calling to report that she does not want to take carvedilol and wants to go back to metoprolol. Within a few days of the change (made 4/2/24), she states she developed uncontrolled diarrhea as well as increased peripheral edema which she describes as being \"like cellulitis.\" She also thinks that she is overmedicated as her BP before meds was in the 140's and then by afternoon it was in the 120's for which she held her PM dose of hydralazine.     Amlodipine 2.5mg daily was restarted on 3/28/25. Hydralazine was increased to 50mg TID early March.     Called patient, she does have some redness on her legs bilaterally and is using castor oil. She says she was considering taking some lasix this morning to help the swelling, says her PCP told her she could take it PRN. She is upset because she's supposed to have a MOHs procedure tomorrow and she doesn't want this to \"mess things up for her.\" Homecare was there with the patient at the time of the call, says edema is +3 on the left and +2 on the left. Nursing says the legs are normal temperature-wise, redness is from ankle to mid-shin, not painful. BP at time of call was 170/96 (no meds yet today), HR 72, temp 98.7F Dr Zuluaga messaged.   "

## 2025-04-07 NOTE — TELEPHONE ENCOUNTER
Kirill Zuluaga MD  You3 minutes ago (4:08 PM)     I think she needs to see and establish with a new physician as there is a lot of moving parts and this is too much to dump on an DOMI.  She should be seen in an urgent care slot with a new cardiologist.       Spoke to patient, reviewed message from Dr Zuluaga. She was agreeable to setting up a visit. Scheduling messaged. She wonders what to do about her meds in the meantime, routed back to Dr Zuluaga. She will not be able to talk on the phone tomorrow, asks that any recommendations be sent via [employer].

## 2025-04-08 ENCOUNTER — PRE VISIT (OUTPATIENT)
Dept: DERMATOLOGY | Facility: CLINIC | Age: 75
End: 2025-04-08

## 2025-04-08 ENCOUNTER — TELEPHONE (OUTPATIENT)
Dept: DERMATOLOGY | Facility: CLINIC | Age: 75
End: 2025-04-08

## 2025-04-08 ENCOUNTER — TELEPHONE (OUTPATIENT)
Dept: INFECTIOUS DISEASES | Facility: CLINIC | Age: 75
End: 2025-04-08

## 2025-04-08 ENCOUNTER — TELEPHONE (OUTPATIENT)
Dept: CARDIOLOGY | Facility: CLINIC | Age: 75
End: 2025-04-08

## 2025-04-08 ENCOUNTER — OFFICE VISIT (OUTPATIENT)
Dept: DERMATOLOGY | Facility: CLINIC | Age: 75
End: 2025-04-08
Payer: MEDICARE

## 2025-04-08 ENCOUNTER — TELEPHONE (OUTPATIENT)
Dept: INTERNAL MEDICINE | Facility: CLINIC | Age: 75
End: 2025-04-08

## 2025-04-08 VITALS — DIASTOLIC BLOOD PRESSURE: 81 MMHG | HEART RATE: 83 BPM | OXYGEN SATURATION: 99 % | SYSTOLIC BLOOD PRESSURE: 153 MMHG

## 2025-04-08 DIAGNOSIS — N18.32 TYPE 2 DIABETES MELLITUS WITH STAGE 3B CHRONIC KIDNEY DISEASE, WITHOUT LONG-TERM CURRENT USE OF INSULIN (H): Primary | ICD-10-CM

## 2025-04-08 DIAGNOSIS — I87.2 STASIS DERMATITIS OF BOTH LEGS: ICD-10-CM

## 2025-04-08 DIAGNOSIS — N39.0 URINARY TRACT INFECTION: ICD-10-CM

## 2025-04-08 DIAGNOSIS — E11.22 TYPE 2 DIABETES MELLITUS WITH STAGE 3B CHRONIC KIDNEY DISEASE, WITHOUT LONG-TERM CURRENT USE OF INSULIN (H): Primary | ICD-10-CM

## 2025-04-08 DIAGNOSIS — C44.320 SCC (SQUAMOUS CELL CARCINOMA), FACE: Primary | ICD-10-CM

## 2025-04-08 PROCEDURE — 17311 MOHS 1 STAGE H/N/HF/G: CPT | Mod: GC | Performed by: DERMATOLOGY

## 2025-04-08 PROCEDURE — 14041 TIS TRNFR F/C/C/M/N/A/G/H/F: CPT | Mod: GC | Performed by: DERMATOLOGY

## 2025-04-08 PROCEDURE — 17312 MOHS ADDL STAGE: CPT | Mod: GC | Performed by: DERMATOLOGY

## 2025-04-08 PROCEDURE — 88305 TISSUE EXAM BY PATHOLOGIST: CPT | Mod: 26 | Performed by: DERMATOLOGY

## 2025-04-08 PROCEDURE — 3077F SYST BP >= 140 MM HG: CPT | Performed by: DERMATOLOGY

## 2025-04-08 PROCEDURE — 1126F AMNT PAIN NOTED NONE PRSNT: CPT | Performed by: DERMATOLOGY

## 2025-04-08 PROCEDURE — 3079F DIAST BP 80-89 MM HG: CPT | Performed by: DERMATOLOGY

## 2025-04-08 PROCEDURE — 88305 TISSUE EXAM BY PATHOLOGIST: CPT | Mod: TC | Performed by: DERMATOLOGY

## 2025-04-08 RX ORDER — DIAZEPAM 5 MG/1
5 TABLET ORAL ONCE
Status: COMPLETED | OUTPATIENT
Start: 2025-04-08 | End: 2025-04-08

## 2025-04-08 RX ORDER — HYDROCODONE BITARTRATE AND ACETAMINOPHEN 5; 325 MG/1; MG/1
1 TABLET ORAL EVERY 6 HOURS PRN
Qty: 7 TABLET | Refills: 0 | Status: SHIPPED | OUTPATIENT
Start: 2025-04-08 | End: 2025-04-11

## 2025-04-08 RX ORDER — DIAZEPAM 5 MG/1
5 TABLET ORAL ONCE
Status: DISCONTINUED | OUTPATIENT
Start: 2025-04-08 | End: 2025-04-08

## 2025-04-08 RX ORDER — TRIAMCINOLONE ACETONIDE 1 MG/G
OINTMENT TOPICAL 2 TIMES DAILY
Qty: 80 G | Refills: 0 | Status: SHIPPED | OUTPATIENT
Start: 2025-04-08

## 2025-04-08 RX ORDER — METOPROLOL SUCCINATE 25 MG/1
25 TABLET, EXTENDED RELEASE ORAL DAILY
COMMUNITY
Start: 2025-04-08

## 2025-04-08 RX ADMIN — DIAZEPAM 5 MG: 5 TABLET ORAL at 10:15

## 2025-04-08 RX ADMIN — DIAZEPAM 5 MG: 5 TABLET ORAL at 09:24

## 2025-04-08 ASSESSMENT — PAIN SCALES - GENERAL: PAINLEVEL_OUTOF10: NO PAIN (0)

## 2025-04-08 NOTE — TELEPHONE ENCOUNTER
3/15/25 SKILLED NURSING/PHYSICAL THERAPY/OCCUPATIONAL THERAPY orders received via fax. Form in your mailbox to be signed.

## 2025-04-08 NOTE — NURSING NOTE
Drug Administration Record    Prior to injection, verified patient identity using patient's name and date of birth.  Due to injection administration, patient instructed to remain in clinic for 15 minutes  afterwards, and to report any adverse reaction to me immediately.    Drug Name: diazepam  Dose: 5mg  Route administered: PO  NDC #: [id number]  Amount of waste(mL):NA  Reason for waste: NA    LOT #: [id number]  SITE: NA  : [facility] inst.   EXPIRATION DATE: 10/26    Flori LEVI RN  Dermatology Surgery

## 2025-04-08 NOTE — TELEPHONE ENCOUNTER
Follow up call to [facility] to discuss new orders from Dr. Monetro, spoke with pharmacist Jamee Montero, MD Treva Cano Susan, RN  Cc: [facility] Infectious Disease Adult [facility] Rn  Her UA shows pyelo.  She needs to start on 2 more weeks of ertapenem IV.    She will place the orders for Ertatpenem 1 gram IV every 24 hours for 2 weeks, with weekly lab work- CBC with Diff, SCr, LFTs, CRP,  weekly with Urine culture 1 week following end of IV Antibiotics.    Diana will reach out to patient.    Call to home care nurse Rylie with update, message left. Instructed to call this RN back to discuss extension of IV antibiotic therapy.    PRIMITIVO Ross, RN  RN Care Coordinator Infectious Disease Clinic

## 2025-04-08 NOTE — TELEPHONE ENCOUNTER
Rylie returned call to this RN. Reviewed on-going OPAT plan with nurse, discussed that Diana from [facility] had left a message for this RN confirming that she had spoken with the patient and patient is aware of the IV daptomycin for an additional 2 weeks.    Rylie will plan for a lab draw on 4/14, 4/21, antibiotics stopping that week and a follow-up UA/UC on 4/28.    All questions answered.    Linda Tilley, AURELION, RN  RN Care Coordinator Infectious Disease Clinic

## 2025-04-08 NOTE — TELEPHONE ENCOUNTER
Follow up call attempted & left voicemail following Mohs procedure with Dr. May.       Are you having pain?   Are you taking pain medication?   Are you applying ice?    Have you had any noticeable bleeding through the bandage?    Do you have any other concerns?        Please call [phone] if you have any questions or concerns during business hours. For concerns after hours, call the hospital  to reach the dermatology resident on call at [phone], option 4.

## 2025-04-08 NOTE — LETTER
4/8/2025       RE: Luz Thompson  [address]  [place] [address]     Dear Colleague,    Thank you for referring your patient, Luz Thompson, to the [facility]MATOLOG[facility] [place] at [facility]. Please see a copy of my visit note below.    [facility] Dermatologic Surgery Clinic [place] Procedure Note    Dermatology Problem List  # History NMSC,  - L cheek, SCC, s/p Mohs 4/8/25     ------ per patient report, tx in [place]  - SCC, R eyebrow, 2024  - SCC, R forehead, 2023  - SCC, L hand, 2021  - SCC, L upper arm, unknown date  # Pruritic papular skin eruption-self-induced, resolved  # Mild atopic dermatitis- hands, thumbs, abdomen, back   - previous: Amlactin, patient discontinued   # Actinic keratoses- right temple, left cheek, s/p LN2  # Onychomycosis-  Oral voriconazole, prescribed by Dr. Montero ([facility])   # Drug hypersensitivity reaction- fluconazole  - residual lesions of abdomen and chest   # Atrophe shanna  # Drug allergies   -s/p intradermal testing to several ABX 8/2019, noncontributory  -plan: oral provocation testing   ________________________________________________________    Date of Service:  Apr 8, 2025  Surgery: Mohs micrographic surgery    Case 1  Repair Type: local flap - V to Y advancement Flap  Repair Size: 5.9 x 2.2 cm  Suture Material: 4-0 monocryl, 5-0 vicryl rapide  Tumor Type: SCC - Squamous cell carcinoma  Location: L cheek  Derm-Path Accession #: [id number]  PreOp Size: 2.5 x 2.5 cm  PostOp Size: 3.5 x 3.0 cm  Mohs Accession #:   Level of Defect: muscle      Procedure:  We discussed the principles of treatment and most likely complications including scarring, bleeding, infection, swelling, pain, crusting, nerve damage, large wound,  incomplete excision, wound dehiscence,  nerve damage, recurrence, and a second procedure may be recommended to obtain the best cosmetic or functional  result.    Informed consent was obtained and the patient underwent the procedure as follows:  The patient was placed supine on the operating table.  The cancer was identified, outlined with a marker, and verified by the patient.  The entire surgical field was prepped with Hibiclens.  The surgical site was anesthetized using Lidocaine 1% with epi 1:100,000.      The area of clinically apparent tumor was debulked. The layer of tissue was then surgically excised using a #15 blade and was then transferred onto a specimen sheet maintaining the orientation of the specimen. Hemostasis was obtained using heat cautery. The wound site was then covered with a dressing while the tissue samples were processed for examination.    The excised tissue was transported to the Mohs histology laboratory maintaining the tissue orientation.  The tissue specimen was relaxed so that the entire surgical margin was in a a single horizontal plane for sectioning and inked for precise mapping.  A precise reference map was drawn to reflect the sectioning of the specimen, colored inking of the margins, and orientation on the patient.  The tissue was processed using horizontal sectioning of the base and continuous peripheral margins.  The histopathologic sections were reviewed in conjunction with the reference map.    Total blocks: 3    Total slides:  8    Residual tumor was identified and indicated in red on the reference map, identifying the location where further tissue excision was necessary. Therefore, an additional stage of Mohs Micrographic surgery was deemed necessary.     Stage II   The patient was returned to the operating room, and the area prepped in the usual manner. The residual tumor was excised using the reference map as a guide. The specimen was transfered to a labeled specimen sheet maintaining the orientation of the specimen. Hemostasis was obtained and the wound site was covered with a dressing while the tissue was processed for  examination.     The excised tissue was transported to the Mohs histology laboratory maintaining orientation. The specimen margins were inked for precise mapping and a reference map was prepared for the is additional stage to maintain precise orientation as described above. The tissue was processed using horizontal sectioning of the base and continuous peripheral margins. The histopathologic sections were reviewed in conjunction with the reference map.     Total blocks: 1    Total slides:  3    There were no cancer cells visualized on examination, therefore Mohs surgery was complete.         V to Y Island Pedicle Advancement Flap:  Because of the full-thickness nature of the defect, and as not to distort the surrounding structures (L oral commissure), an island pedicle flap was carefully planned. The site was prepped with chlorhexidine,  local anesthesia administered, and the patient draped in a sterile fashion.  A triangular incision was made inferior to the primary defect to the mandible. The flap was carefully dissected to a muscular subcutaneous pedicle.  The skin surrounding the flap was undermined to allow for closure of the donor site defect and after hemostasis was obtained. The flap was advanced into place (carried over the primary defect), sutured in a layered fashion and a dressing was applied.  Advancement flap size was 5.9cm x2.2 cm.   Estimated blood loss, minimal; complications, none; bandaging and wound care, routine. Total anesthesia 25 ml. Patient was discharged in good condition and will return for wound check in 2 weeks.   Repair Size: 5.9 x 2.2 cm  Sutures Used:  4-0 monocryl, 5-0 vicryl rapide.     The attending surgeon was immediately available for the entire surgery and key portions of the above major procedure and examination..    Staff Involved:    Scribe Disclosure:   JUSTIN PHILLIPS am serving as a scribe; to document services personally performed by Luciano May MD -based on data  collection and the provider's statements to me.     Provider Disclosure:  I agree with above History, Review of Systems, Physical exam and Plan.  I have reviewed the content of the documentation and have edited it as needed. I have personally performed the services documented here and the documentation accurately represents those services and the decisions I have made.      Electronically signed by:    Staff Physician Comments:   I saw and evaluated the patient with the Mohs Surgery Fellow (Dr. Mickey Peters) and I agree with the assessment and plan and the above description of the procedure as documented by the scribe. I was present for the entire procedure and entire exam.    Luciano May DO    Department of Dermatology  [facility] Clin[facility]: Phone: [phone], Fax:[phone]  [facility]: Phone: [phone], Fax: [phone]    Care and Laboratory Testing Performed at:  [facility]   [facility] - Dermatologic Surgery Clinic  [address] [place] [address]   Mail Code [id number]   [place] [address]      Again, thank you for allowing me to participate in the care of your patient.      Sincerely,    Luciano May MD

## 2025-04-08 NOTE — PROGRESS NOTES
[facility] Dermatologic Surgery Clinic [place] Procedure Note    Dermatology Problem List  # History NMSC,  - L cheek, SCC, s/p Mohs 4/8/25     ------ per patient report, tx in [place]  - SCC, R eyebrow, 2024  - SCC, R forehead, 2023  - SCC, L hand, 2021  - SCC, L upper arm, unknown date  # Pruritic papular skin eruption-self-induced, resolved  # Mild atopic dermatitis- hands, thumbs, abdomen, back   - previous: Amlactin, patient discontinued   # Actinic keratoses- right temple, left cheek, s/p LN2  # Onychomycosis-  Oral voriconazole, prescribed by Dr. Montero ([facility])   # Drug hypersensitivity reaction- fluconazole  - residual lesions of abdomen and chest   # Atrophe shanna  # Drug allergies   -s/p intradermal testing to several ABX 8/2019, noncontributory  -plan: oral provocation testing   ________________________________________________________    Date of Service:  Apr 8, 2025  Surgery: Mohs micrographic surgery    Case 1  Repair Type: local flap - V to Y advancement Flap  Repair Size: 5.9 x 2.2 cm  Suture Material: 4-0 monocryl, 5-0 vicryl rapide  Tumor Type: SCC - Squamous cell carcinoma  Location: L cheek  Derm-Path Accession #: [id number]  PreOp Size: 2.5 x 2.5 cm  PostOp Size: 3.5 x 3.0 cm  Mohs Accession #:   Level of Defect: muscle      Procedure:  We discussed the principles of treatment and most likely complications including scarring, bleeding, infection, swelling, pain, crusting, nerve damage, large wound,  incomplete excision, wound dehiscence,  nerve damage, recurrence, and a second procedure may be recommended to obtain the best cosmetic or functional result.    Informed consent was obtained and the patient underwent the procedure as follows:  The patient was placed supine on the operating table.  The cancer was identified, outlined with a marker, and verified by the patient.  The entire surgical field was prepped with Hibiclens.  The surgical site was anesthetized using Lidocaine 1% with  epi 1:100,000.      The area of clinically apparent tumor was debulked. The layer of tissue was then surgically excised using a #15 blade and was then transferred onto a specimen sheet maintaining the orientation of the specimen. Hemostasis was obtained using heat cautery. The wound site was then covered with a dressing while the tissue samples were processed for examination.    The excised tissue was transported to the Mohs histology laboratory maintaining the tissue orientation.  The tissue specimen was relaxed so that the entire surgical margin was in a a single horizontal plane for sectioning and inked for precise mapping.  A precise reference map was drawn to reflect the sectioning of the specimen, colored inking of the margins, and orientation on the patient.  The tissue was processed using horizontal sectioning of the base and continuous peripheral margins.  The histopathologic sections were reviewed in conjunction with the reference map.    Total blocks: 3    Total slides:  8    Residual tumor was identified and indicated in red on the reference map, identifying the location where further tissue excision was necessary. Therefore, an additional stage of Mohs Micrographic surgery was deemed necessary.     Stage II   The patient was returned to the operating room, and the area prepped in the usual manner. The residual tumor was excised using the reference map as a guide. The specimen was transfered to a labeled specimen sheet maintaining the orientation of the specimen. Hemostasis was obtained and the wound site was covered with a dressing while the tissue was processed for examination.     The excised tissue was transported to the Mohs histology laboratory maintaining orientation. The specimen margins were inked for precise mapping and a reference map was prepared for the is additional stage to maintain precise orientation as described above. The tissue was processed using horizontal sectioning of the base and  continuous peripheral margins. The histopathologic sections were reviewed in conjunction with the reference map.     Total blocks: 1    Total slides:  3    There were no cancer cells visualized on examination, therefore Mohs surgery was complete.         V to Y Island Pedicle Advancement Flap:  Because of the full-thickness nature of the defect, and as not to distort the surrounding structures (L oral commissure), an island pedicle flap was carefully planned. The site was prepped with chlorhexidine,  local anesthesia administered, and the patient draped in a sterile fashion.  A triangular incision was made inferior to the primary defect to the mandible. The flap was carefully dissected to a muscular subcutaneous pedicle.  The skin surrounding the flap was undermined to allow for closure of the donor site defect and after hemostasis was obtained. The flap was advanced into place (carried over the primary defect), sutured in a layered fashion and a dressing was applied.  Advancement flap size was 5.9cm x2.2 cm.   Estimated blood loss, minimal; complications, none; bandaging and wound care, routine. Total anesthesia 25 ml. Patient was discharged in good condition and will return for wound check in 2 weeks.   Repair Size: 5.9 x 2.2 cm  Sutures Used:  4-0 monocryl, 5-0 vicryl rapide.     The attending surgeon was immediately available for the entire surgery and key portions of the above major procedure and examination..    Staff Involved:    Scribe Disclosure:   I, JUSTIN TADEO, am serving as a scribe; to document services personally performed by Luciano May MD -based on data collection and the provider's statements to me.     Provider Disclosure:  I agree with above History, Review of Systems, Physical exam and Plan.  I have reviewed the content of the documentation and have edited it as needed. I have personally performed the services documented here and the documentation accurately represents those services  and the decisions I have made.      Electronically signed by:    Staff Physician Comments:   I saw and evaluated the patient with the Mohs Surgery Fellow (Dr. Mickey Peters) and I agree with the assessment and plan and the above description of the procedure as documented by the scribe. I was present for the entire procedure and entire exam.    Luciano May DO    Department of Dermatology  [facility] Clinics: Phone: [phone], Fax:[phone]  [facility]: Phone: [phone], Fax: [phone]    Care and Laboratory Testing Performed at:  [facility]   Clinics and Surgery Center - Dermatologic Surgery Clinic  [address] [place] [address]   Mail Code [id number] [address]

## 2025-04-08 NOTE — TELEPHONE ENCOUNTER
Message left by home care nurse, referencing blood and urine labs had been collected yesterday.    Returned call to [facility], message left. Will update nurse/patient with recommendations from Dr. Montero.    Message sent to Dr. Montero regarding lab work, plan for midline.    Linda Tilley, BSN, RN  RN Care Coordinator [facility]

## 2025-04-08 NOTE — NURSING NOTE
Chief Complaint   Patient presents with    Procedure     Mohs and reconstruction to left cheek.      Carrie SOTELO CMA

## 2025-04-08 NOTE — NURSING NOTE
Drug Administration Record     Prior to injection, verified patient identity using patient's name and date of birth.  Due to injection administration, patient instructed to remain in clinic for 15 minutes  afterwards, and to report any adverse reaction to me immediately.     Drug Name: diazepam  Dose: 5mg  Route administered: PO  NDC #: [id number]  Amount of waste(mL):NA  Reason for waste: NA     LOT #: [id number]  SITE: NA  : [employer] [facility].   EXPIRATION DATE: 10/26   NENO Adan-BSN  Dermatology Surgery

## 2025-04-08 NOTE — TELEPHONE ENCOUNTER
1st attempt- Left voicemail for the patient to call back and schedule the following:    Appointment type:  Urgent  Provider:  Dr Morgan  Return date:  1st available  Additional appointment(s) needed:  fasting labs  Additional Notes:  former [facility] patient  Specialty phone number: [phone]

## 2025-04-08 NOTE — TELEPHONE ENCOUNTER
Kirill Zuluaga MD  You15 hours ago (5:22 PM)     OK       [employer] message sent to patient. Med list updated.

## 2025-04-08 NOTE — PATIENT INSTRUCTIONS
Wound care    I will experience scar, bleeding, swelling, pain, crusting and redness. I may experience incomplete removal or recurrence. Risks are bleeding, bruising, swelling, infection, nerve damage, & a large wound. A second procedure may be recommended to obtain the best cosmetic or functional result.       A three month office visit with your Surgeon is recommended for scar evaluation. Please reach out sooner if you have concerns about you surgical site/wound.    Caring for your skin after surgery    After your surgery, a pressure bandage will be placed over the area that has stitches. This is important to prevent bleeding. Please follow these instructions over the next 1 to 2 weeks. Following this regimen will help to prevent complications as your wound heals.     For the first 48 hours after your surgery:    Leave the pressure dressing on and keep it dry. If it should come loose, you may re-tape it, but do not take it off.  Relax and take it easy. Do not do any vigorous exercise or heavy lifting. This could cause the wound to bleed.  Post-Operative pain is usually mild. If you are able to take tylenol, You may take plain or extra-strength Tylenol (acetaminophen) As directed on the bottle (do not take more than 4,000mg in one day). If you are able to take ibuprofen, you can alternate the tylenol and ibuprofen.   Avoid alcohol as this may increase your tendency to bleed.   You may put an ice pack around the bandaged area for 20 minutes at a time as needed. This may help reduce swelling, bruising, and pain. Make sure the ice pack is waterproof so that the pressure bandage doesn t get wet.  If the wound is on the face try to sleep with your head elevated. Either in a recliner or propped up in bed, this will decrease swelling around the eyes.   You may see a small amount of drainage or blood on your pressure bandage. This is normal. However:  If drainage or bleeding continues or saturates the bandage, you will  need to apply firm pressure over the bandage with a piece of gauze for 15 minutes.  If bleeding continues after applying pressure for 15 minutes, apply an ice pack to the bandaged area for 15 minutes.  If bleeding still continues, call our office or go to the nearest emergency room.    Remove pressure dressing 48 hours after surgery:    Carefully remove the pressure bandage. If it seems sticky or too difficult to get off, you may need to soak it off in the shower.  After the pressure dressing is removed, you may shower and get the wound wet. However, Do Not let the forceful stream of the shower hit the wound directly.  Follow these wound care and dressing change instructions:    You have skin glue over the stitches. This will dry and flake off with time (about 2 weeks). If the stitches are still hanging around after 2 weeks, let us know, we can clip them out for you if they do not fall out with washing.   You may allow water to run over the site. Do not soak.  Do Not rub or scrub the site.  Pat dry after the shower or bath.  Avoid topical medications, lotions, creams, ointment,or oils.  Do not use tanning lamps or expose the site to sun.   Check wound appearance daily, some swelling and redness is normal after a procedure but should go away as your would is healing. If the swelling and redness or pain increases or if any other signs of infection occur listed below please send in a photo via my chart and or call us to let us know.  The clear glue film should start peeling and flaking off approximately around 2 weeks. By this time your wound should be sufficiently healed. If it still looks to be healing when the glue comes off you can clean the wound with soapy warm water daily in the shower. No need to bandage further, but you can put a bandage on the area daily for the two weeks if you would like.   If skin glue and sutures are still intact at 2 weeks after your procedure, you can start applying Vaseline daily to  "help soften up the skin glue. It will come off easier this way.        If you are able to take acetaminophen (\"Tylenol,\" etc.) and ibuprofen (\"Advil\" or \"Motrin,\" etc.), then you may STAGGER these medications by taking 400 mg of ibuprofen (usually two tabs) every 8 hours and 1,000 mg of acetaminophen (e.g., two tabs of extra-strength Tylenol) every 8 hours.    This means, for example, that you could take the followin,000 mg of Tylenol, followed 4 hours later by 400 mg Ibuprofen, followed 4 hours later with 1,000 mg of Tylenol, followed 4 hours later by 400 mg Ibuprofen, followed 4 hours later with 1,000 mg of Tylenol, and so forth.     Essentially, you can take either 1,000 mg of Tylenol or 400 mg of ibuprofen in alternating fashion EVERY FOUR HOURS.    Do NOT exceed more than 4,000 mg of Tylenol or 3,200 mg of ibuprofen per 24 hours. If you are not able to take Tylenol or ibuprofen as above due to other health issues (or a physician has told you directly that you are not allowed to take one of them, say due to pre-existing severe liver or kidney issues), then disregard the above directions.    Scientific evidence supports that this combination/schedule of pain medications is just as effective, if not more effective, than taking a narcotic pain medicine.       Follow up will be a 3 month scar evaluation either in person or via a telephone visit with you sending in a photo via Fly[employer]. Unless you have been told to follow up sooner or if you have concerns and would like to be see sooner. Please call or send us in a FlyClip message if possible and attach a photo.        What to expect:    The first couple of days your wound may be tender and may bleed slightly when doing wound care.  There may be swelling and bruising around the wound, especially if it is near the eyes. For your comfort, you may apply ice or cold compresses to the bruises after your have removed the pressure bandage.  The area around your wound may " be numb for several weeks or even months.  You may experience periodic sharp pain or mild itching around the wound as it heals.   The suture line will look dark for a while but will lighten over time.       When to call us:    You have bleeding that will not stop after applying pressure and ice.  You have pain that is not controlled with Tylenol (acetaminophen.)  You have signs or symptoms of an infection such as:  Fever over 100 degrees Fahrenheit  Redness, warmth or foul-smelling drainage from the wound  If you have any questions, or are not sure how to take care of the wound.    Phone numbers:    During business hours (M-F 8:00-4:30 p.m.)  [facility]-  [phone] Option 1 appt. Desk and ask for the Dermatology Surgery Team  [phone] [facility] .     ---------------------------------------------------------  Evenings/Weekends/Holidays  Hospital - [phone]   TTY for hearing xevtnyji-[phone]  *Ask  to page dermatologist on-call  Emergency Gmca-[phone]  TTY for hearing impaired- [phone]

## 2025-04-09 ENCOUNTER — TELEPHONE (OUTPATIENT)
Dept: CARDIOLOGY | Facility: CLINIC | Age: 75
End: 2025-04-09
Payer: MEDICARE

## 2025-04-09 ENCOUNTER — MEDICAL CORRESPONDENCE (OUTPATIENT)
Dept: HEALTH INFORMATION MANAGEMENT | Facility: CLINIC | Age: 75
End: 2025-04-09

## 2025-04-09 NOTE — TELEPHONE ENCOUNTER
Health Call Center    Phone Message    May a detailed message be left on voicemail: yes     Reason for Call: Other: pt wondering if lipid lab should be done in April before Eddie' appt or in Aug as ordered. She thought lipids were to stay on the August schedule and just the ALT and BMP were to be before Eddie' appt...? Pt will be getting ALT and BMP completed prior to Eddie' appt with her transplant team and could add on the lipid if needed. Please call pt back to discuss.        Action Taken: Other: cardiology     Travel Screening: Not Applicable      Thank you!  [facility]        Date of Service:

## 2025-04-09 NOTE — TELEPHONE ENCOUNTER
Per review of chart, patient was to have lipid profile rechecked 3 months after resuming repatha per telephone note 9/6/24. Does not appear labs were drawn. Patient reports she has not been taking repatha consistently due to insurance  issues concern with not having it refrigerated. Patient reports she is now taking this, however is refusing having lipid profile drawn prior to appointment with Dr. Morgan and will discuss at visit.

## 2025-04-09 NOTE — TELEPHONE ENCOUNTER
EXAM NOTE      HISTORY    Tami Lawson is a 81 year old female who is here to establish care.  She has a history of type 2 diabetes mellitus which overall has been stable.  She is on Jardiance which is very expensive.  She was on metformin previously but this caused GI disturbances.  She has a history of hypertension stable on medications.  A history of hypercholesterolemia also stable    MEDICAL HISTORY    Patient Active Problem List    Diagnosis Date Noted   • Type 2 diabetes mellitus without complication, without long-term current use of insulin (CMS/[facility]) 01/19/2021     Priority: Low   • Hypertension associated with diabetes (CMS/[facility]) 01/19/2021     Priority: Low   • Pure hypercholesterolemia 01/19/2021     Priority: Low        SOCIAL HISTORY    Tami reports that she has never smoked. She has never used smokeless tobacco.    MEDICATIONS    Current Outpatient Medications   Medication Sig   • tolterodine (DETROL) 2 MG tablet Take 2 mg by mouth 2 times daily.   • carvedilol (COREG) 6.25 MG tablet Take 1 tablet by mouth every morning.   • carvedilol (COREG) 3.125 MG tablet Take 1 tablet by mouth nightly.   • rosuvastatin (CRESTOR) 10 MG tablet Take 10 mg by mouth nightly.   • losartan (COZAAR) 25 MG tablet Take 25 mg by mouth daily.   • glipiZIDE (GLUCOTROL) 5 MG 24 hr tablet Take 1 tablet by mouth 2 times daily.   • Turmeric Curcumin 500 MG Cap Take 500 mg by mouth.   • omeprazole (PriLOSEC OTC) 20 MG tablet Take 20 mg by mouth daily.     No current facility-administered medications for this visit.         ALLERGIES:  No Known Allergies    REVIEW OF SYSTEMS    Reviewed        PHYSICAL EXAM    Visit Vitals  /84   Pulse 68   Temp 97.2 °F (36.2 °C)   Ht 5' 4\" (1.626 m)   Wt 84.8 kg   BMI 32.10 kg/m²     Constitutional:  Alert, no acute distress.  Integument:  Warm. Dry. No erythema. No rashes or jaundice.         ASSESSMENT & PLAN    1.  Type 2 diabetes mellitus:  Stop Jardiance.  Increase glipizide to 5 mg  Paperwork signed and faxed.     b.i.d..  Recheck A1 c in 3 months.    2. Hypertension associated with diabetes:  Stable.    3. Hypercholesterolemia:  Stable

## 2025-04-10 ENCOUNTER — TELEPHONE (OUTPATIENT)
Dept: DERMATOLOGY | Facility: CLINIC | Age: 75
End: 2025-04-10

## 2025-04-10 NOTE — TELEPHONE ENCOUNTER
[facility] Call Center    Phone Message    May a detailed message be left on voicemail: yes     Reason for Call: Other: pt is calling to talk to clinic about after care for mohs procedure, please call back thanks!     Action Taken: Message routed to:  [facility] DERM    Travel Screening: Not Applicable     Date of Service:                                                                     
Spoke with patient. Pt wondering if she can wash her face now that the pressure dressing is off. Advised pt she can allow water to run over the incision and to gently pat dry. Advised not to rub or scrub the area. Pt verbalized understanding. Assisted pt in rescheduled her nurse visit on 2 weeks.     Flori LEVI RN  Dermatology Surgery    
Yes

## 2025-04-11 ENCOUNTER — MEDICAL CORRESPONDENCE (OUTPATIENT)
Dept: HEALTH INFORMATION MANAGEMENT | Facility: CLINIC | Age: 75
End: 2025-04-11

## 2025-04-11 ENCOUNTER — TELEPHONE (OUTPATIENT)
Dept: INFECTIOUS DISEASES | Facility: CLINIC | Age: 75
End: 2025-04-11

## 2025-04-11 DIAGNOSIS — N39.0 URINARY TRACT INFECTION: ICD-10-CM

## 2025-04-11 NOTE — TELEPHONE ENCOUNTER
DATE/TIME OF CALL RECEIVED FROM LAB:  04/11/25 at 9:00 AM   LAB TEST:  urine culture  LAB VALUE:  culture + (previously known however culture susceptibility is different than listed on previous result. Culture not susceptible to Ceftazidime Cefepime (now corrected in lab results)  PROVIDER NOTIFIED?: message sent to provider  PROVIDER NAME: Young  Provider has already changed treatment plan to Ertatpenem 1 gram IV every 24 hours for 2 week   PROVIDER RESPONSE: no change in treatment plan as md has already changed order.    Jazz Bush RN

## 2025-04-12 ENCOUNTER — HEALTH MAINTENANCE LETTER (OUTPATIENT)
Age: 75
End: 2025-04-12

## 2025-04-14 ENCOUNTER — TELEPHONE (OUTPATIENT)
Dept: INTERNAL MEDICINE | Facility: CLINIC | Age: 75
End: 2025-04-14

## 2025-04-14 ENCOUNTER — APPOINTMENT (OUTPATIENT)
Dept: GENERAL RADIOLOGY | Facility: CLINIC | Age: 75
DRG: 872 | End: 2025-04-14
Attending: PHYSICIAN ASSISTANT
Payer: MEDICARE

## 2025-04-14 ENCOUNTER — TELEPHONE (OUTPATIENT)
Dept: DERMATOLOGY | Facility: CLINIC | Age: 75
End: 2025-04-14

## 2025-04-14 ENCOUNTER — HOSPITAL ENCOUNTER (INPATIENT)
Facility: CLINIC | Age: 75
End: 2025-04-14
Attending: EMERGENCY MEDICINE | Admitting: INTERNAL MEDICINE
Payer: MEDICARE

## 2025-04-14 DIAGNOSIS — R50.9 FEVER, UNSPECIFIED FEVER CAUSE: ICD-10-CM

## 2025-04-14 DIAGNOSIS — I50.32 CHRONIC DIASTOLIC HEART FAILURE (H): ICD-10-CM

## 2025-04-14 DIAGNOSIS — C44.320 SCC (SQUAMOUS CELL CARCINOMA), FACE: ICD-10-CM

## 2025-04-14 DIAGNOSIS — F32.A DEPRESSION, UNSPECIFIED DEPRESSION TYPE: ICD-10-CM

## 2025-04-14 DIAGNOSIS — K63.1 PERFORATION OF INTESTINE (H): ICD-10-CM

## 2025-04-14 DIAGNOSIS — C44.92 SQUAMOUS CELL CARCINOMA OF SKIN, UNSPECIFIED: Primary | ICD-10-CM

## 2025-04-14 DIAGNOSIS — G47.00 INSOMNIA, UNSPECIFIED TYPE: ICD-10-CM

## 2025-04-14 DIAGNOSIS — E55.9 VITAMIN D DEFICIENCY: ICD-10-CM

## 2025-04-14 DIAGNOSIS — M62.81 GENERALIZED MUSCLE WEAKNESS: ICD-10-CM

## 2025-04-14 DIAGNOSIS — N18.32 TYPE 2 DIABETES MELLITUS WITH STAGE 3B CHRONIC KIDNEY DISEASE, WITHOUT LONG-TERM CURRENT USE OF INSULIN (H): ICD-10-CM

## 2025-04-14 DIAGNOSIS — E11.22 TYPE 2 DIABETES MELLITUS WITH STAGE 3B CHRONIC KIDNEY DISEASE, WITHOUT LONG-TERM CURRENT USE OF INSULIN (H): ICD-10-CM

## 2025-04-14 DIAGNOSIS — N39.0 UTI (URINARY TRACT INFECTION): ICD-10-CM

## 2025-04-14 DIAGNOSIS — J18.9 PNEUMONIA OF RIGHT LOWER LOBE DUE TO INFECTIOUS ORGANISM: ICD-10-CM

## 2025-04-14 DIAGNOSIS — N30.00 ACUTE CYSTITIS WITHOUT HEMATURIA: ICD-10-CM

## 2025-04-14 DIAGNOSIS — A41.9 SEPSIS WITHOUT ACUTE ORGAN DYSFUNCTION, DUE TO UNSPECIFIED ORGANISM (H): Primary | ICD-10-CM

## 2025-04-14 DIAGNOSIS — N17.9 AKI (ACUTE KIDNEY INJURY): ICD-10-CM

## 2025-04-14 DIAGNOSIS — Z94.4 H/O LIVER TRANSPLANT (H): ICD-10-CM

## 2025-04-14 DIAGNOSIS — R06.02 SHORTNESS OF BREATH: ICD-10-CM

## 2025-04-14 DIAGNOSIS — N39.0 URINARY TRACT INFECTION: ICD-10-CM

## 2025-04-14 DIAGNOSIS — N10 PYELONEPHRITIS, ACUTE: ICD-10-CM

## 2025-04-14 DIAGNOSIS — J18.9 PNEUMONIA DUE TO INFECTIOUS ORGANISM, UNSPECIFIED LATERALITY, UNSPECIFIED PART OF LUNG: ICD-10-CM

## 2025-04-14 DIAGNOSIS — R53.81 PHYSICAL DECONDITIONING: ICD-10-CM

## 2025-04-14 DIAGNOSIS — E78.5 HYPERLIPIDEMIA LDL GOAL <70: ICD-10-CM

## 2025-04-14 DIAGNOSIS — M19.019 OSTEOARTHRITIS OF SHOULDER, UNSPECIFIED LATERALITY, UNSPECIFIED OSTEOARTHRITIS TYPE: ICD-10-CM

## 2025-04-14 DIAGNOSIS — N18.4 CHRONIC KIDNEY DISEASE, STAGE 4 (SEVERE) (H): ICD-10-CM

## 2025-04-14 DIAGNOSIS — N18.32 STAGE 3B CHRONIC KIDNEY DISEASE (H): ICD-10-CM

## 2025-04-14 DIAGNOSIS — M81.8 OTHER OSTEOPOROSIS WITHOUT CURRENT PATHOLOGICAL FRACTURE: ICD-10-CM

## 2025-04-14 DIAGNOSIS — C49.0: ICD-10-CM

## 2025-04-14 DIAGNOSIS — N39.0 URINARY TRACT INFECTION WITHOUT HEMATURIA, SITE UNSPECIFIED: ICD-10-CM

## 2025-04-14 DIAGNOSIS — M81.0 AGE-RELATED OSTEOPOROSIS WITHOUT CURRENT PATHOLOGICAL FRACTURE: ICD-10-CM

## 2025-04-14 LAB
ALBUMIN SERPL BCG-MCNC: 3.3 G/DL (ref 3.5–5.2)
ALBUMIN UR-MCNC: 20 MG/DL
ALP SERPL-CCNC: 84 U/L (ref 40–150)
ALT SERPL W P-5'-P-CCNC: 12 U/L (ref 0–50)
ANION GAP SERPL CALCULATED.3IONS-SCNC: 12 MMOL/L (ref 7–15)
APPEARANCE UR: ABNORMAL
AST SERPL W P-5'-P-CCNC: 16 U/L (ref 0–45)
ATRIAL RATE - MUSE: 85 BPM
BASOPHILS # BLD AUTO: 0 10E3/UL (ref 0–0.2)
BASOPHILS NFR BLD AUTO: 0 %
BILIRUB SERPL-MCNC: 0.3 MG/DL
BILIRUB UR QL STRIP: NEGATIVE
BUN SERPL-MCNC: 25.9 MG/DL (ref 8–23)
BURR CELLS BLD QL SMEAR: SLIGHT
CALCIUM SERPL-MCNC: 9 MG/DL (ref 8.8–10.4)
CHLORIDE SERPL-SCNC: 107 MMOL/L (ref 98–107)
COLOR UR AUTO: ABNORMAL
CREAT SERPL-MCNC: 1.42 MG/DL (ref 0.51–0.95)
CRP SERPL-MCNC: 21.4 MG/L
DIASTOLIC BLOOD PRESSURE - MUSE: NORMAL MMHG
EGFRCR SERPLBLD CKD-EPI 2021: 38 ML/MIN/1.73M2
ELLIPTOCYTES BLD QL SMEAR: SLIGHT
EOSINOPHIL # BLD AUTO: 0.1 10E3/UL (ref 0–0.7)
EOSINOPHIL NFR BLD AUTO: 1 %
ERYTHROCYTE [DISTWIDTH] IN BLOOD BY AUTOMATED COUNT: 18.7 % (ref 10–15)
FLUAV RNA SPEC QL NAA+PROBE: NEGATIVE
FLUBV RNA RESP QL NAA+PROBE: NEGATIVE
GLUCOSE BLDC GLUCOMTR-MCNC: 93 MG/DL (ref 70–99)
GLUCOSE SERPL-MCNC: 94 MG/DL (ref 70–99)
GLUCOSE UR STRIP-MCNC: NEGATIVE MG/DL
HCO3 SERPL-SCNC: 22 MMOL/L (ref 22–29)
HCT VFR BLD AUTO: 31.7 % (ref 35–47)
HGB BLD-MCNC: 9.9 G/DL (ref 11.7–15.7)
HGB UR QL STRIP: NEGATIVE
IMM GRANULOCYTES # BLD: 0.2 10E3/UL
IMM GRANULOCYTES NFR BLD: 1 %
INTERPRETATION ECG - MUSE: NORMAL
KETONES UR STRIP-MCNC: NEGATIVE MG/DL
LEUKOCYTE ESTERASE UR QL STRIP: ABNORMAL
LYMPHOCYTES # BLD AUTO: 2.1 10E3/UL (ref 0.8–5.3)
LYMPHOCYTES NFR BLD AUTO: 14 %
MCH RBC QN AUTO: 27.4 PG (ref 26.5–33)
MCHC RBC AUTO-ENTMCNC: 31.2 G/DL (ref 31.5–36.5)
MCV RBC AUTO: 88 FL (ref 78–100)
MONOCYTES # BLD AUTO: 1.6 10E3/UL (ref 0–1.3)
MONOCYTES NFR BLD AUTO: 11 %
NEUTROPHILS # BLD AUTO: 10.8 10E3/UL (ref 1.6–8.3)
NEUTROPHILS NFR BLD AUTO: 73 %
NITRATE UR QL: NEGATIVE
NRBC # BLD AUTO: 0 10E3/UL
NRBC BLD AUTO-RTO: 0 /100
NT-PROBNP SERPL-MCNC: 1446 PG/ML (ref 0–1800)
P AXIS - MUSE: 72 DEGREES
PH UR STRIP: 5.5 [PH] (ref 5–7)
PLAT MORPH BLD: ABNORMAL
PLATELET # BLD AUTO: 369 10E3/UL (ref 150–450)
POTASSIUM SERPL-SCNC: 3.4 MMOL/L (ref 3.4–5.3)
PR INTERVAL - MUSE: 192 MS
PROT SERPL-MCNC: 5.9 G/DL (ref 6.4–8.3)
QRS DURATION - MUSE: 72 MS
QT - MUSE: 344 MS
QTC - MUSE: 409 MS
R AXIS - MUSE: 27 DEGREES
RBC # BLD AUTO: 3.61 10E6/UL (ref 3.8–5.2)
RBC MORPH BLD: ABNORMAL
RBC URINE: 2 /HPF
RSV RNA SPEC NAA+PROBE: NEGATIVE
SARS-COV-2 RNA RESP QL NAA+PROBE: NEGATIVE
SODIUM SERPL-SCNC: 141 MMOL/L (ref 135–145)
SP GR UR STRIP: 1.01 (ref 1–1.03)
SQUAMOUS EPITHELIAL: 7 /HPF
SYSTOLIC BLOOD PRESSURE - MUSE: NORMAL MMHG
T AXIS - MUSE: 58 DEGREES
TRANSITIONAL EPI: 1 /HPF
TROPONIN T SERPL HS-MCNC: 34 NG/L
TROPONIN T SERPL HS-MCNC: 38 NG/L
UROBILINOGEN UR STRIP-MCNC: NORMAL MG/DL
VENTRICULAR RATE- MUSE: 85 BPM
WBC # BLD AUTO: 14.8 10E3/UL (ref 4–11)
WBC URINE: 85 /HPF

## 2025-04-14 PROCEDURE — 71046 X-RAY EXAM CHEST 2 VIEWS: CPT | Mod: 26 | Performed by: STUDENT IN AN ORGANIZED HEALTH CARE EDUCATION/TRAINING PROGRAM

## 2025-04-14 PROCEDURE — 85004 AUTOMATED DIFF WBC COUNT: CPT | Performed by: PHYSICIAN ASSISTANT

## 2025-04-14 PROCEDURE — 99418 PROLNG IP/OBS E/M EA 15 MIN: CPT | Mod: GC | Performed by: INTERNAL MEDICINE

## 2025-04-14 PROCEDURE — 120N000011 HC R&B TRANSPLANT UMMC

## 2025-04-14 PROCEDURE — 86140 C-REACTIVE PROTEIN: CPT

## 2025-04-14 PROCEDURE — 99285 EMERGENCY DEPT VISIT HI MDM: CPT | Mod: FS | Performed by: EMERGENCY MEDICINE

## 2025-04-14 PROCEDURE — 99285 EMERGENCY DEPT VISIT HI MDM: CPT | Mod: 25 | Performed by: EMERGENCY MEDICINE

## 2025-04-14 PROCEDURE — 250N000011 HC RX IP 250 OP 636: Mod: JZ | Performed by: PHYSICIAN ASSISTANT

## 2025-04-14 PROCEDURE — 81001 URINALYSIS AUTO W/SCOPE: CPT | Performed by: PHYSICIAN ASSISTANT

## 2025-04-14 PROCEDURE — 71046 X-RAY EXAM CHEST 2 VIEWS: CPT

## 2025-04-14 PROCEDURE — 36415 COLL VENOUS BLD VENIPUNCTURE: CPT | Performed by: PHYSICIAN ASSISTANT

## 2025-04-14 PROCEDURE — 250N000013 HC RX MED GY IP 250 OP 250 PS 637

## 2025-04-14 PROCEDURE — 96365 THER/PROPH/DIAG IV INF INIT: CPT | Performed by: EMERGENCY MEDICINE

## 2025-04-14 PROCEDURE — 93005 ELECTROCARDIOGRAM TRACING: CPT | Performed by: EMERGENCY MEDICINE

## 2025-04-14 PROCEDURE — 87040 BLOOD CULTURE FOR BACTERIA: CPT | Performed by: PHYSICIAN ASSISTANT

## 2025-04-14 PROCEDURE — 87086 URINE CULTURE/COLONY COUNT: CPT | Performed by: PHYSICIAN ASSISTANT

## 2025-04-14 PROCEDURE — 82962 GLUCOSE BLOOD TEST: CPT

## 2025-04-14 PROCEDURE — 84484 ASSAY OF TROPONIN QUANT: CPT | Performed by: PHYSICIAN ASSISTANT

## 2025-04-14 PROCEDURE — 83036 HEMOGLOBIN GLYCOSYLATED A1C: CPT

## 2025-04-14 PROCEDURE — 84155 ASSAY OF PROTEIN SERUM: CPT | Performed by: PHYSICIAN ASSISTANT

## 2025-04-14 PROCEDURE — 250N000011 HC RX IP 250 OP 636: Performed by: EMERGENCY MEDICINE

## 2025-04-14 PROCEDURE — 99223 1ST HOSP IP/OBS HIGH 75: CPT | Mod: GC | Performed by: INTERNAL MEDICINE

## 2025-04-14 PROCEDURE — 87637 SARSCOV2&INF A&B&RSV AMP PRB: CPT | Performed by: PHYSICIAN ASSISTANT

## 2025-04-14 PROCEDURE — 83880 ASSAY OF NATRIURETIC PEPTIDE: CPT | Performed by: PHYSICIAN ASSISTANT

## 2025-04-14 PROCEDURE — 93010 ELECTROCARDIOGRAM REPORT: CPT | Performed by: EMERGENCY MEDICINE

## 2025-04-14 RX ORDER — ACETAMINOPHEN 325 MG/1
650 TABLET ORAL ONCE
Status: COMPLETED | OUTPATIENT
Start: 2025-04-14 | End: 2025-04-14

## 2025-04-14 RX ORDER — ERTAPENEM 1 G/1
1 INJECTION, POWDER, LYOPHILIZED, FOR SOLUTION INTRAMUSCULAR; INTRAVENOUS ONCE
Status: COMPLETED | OUTPATIENT
Start: 2025-04-14 | End: 2025-04-14

## 2025-04-14 RX ORDER — ASPIRIN 325 MG
325 TABLET ORAL ONCE
Status: DISCONTINUED | OUTPATIENT
Start: 2025-04-14 | End: 2025-04-14

## 2025-04-14 RX ORDER — OXYCODONE HYDROCHLORIDE 5 MG/1
5 TABLET ORAL ONCE
Status: COMPLETED | OUTPATIENT
Start: 2025-04-14 | End: 2025-04-14

## 2025-04-14 RX ADMIN — ACETAMINOPHEN 650 MG: 325 TABLET, FILM COATED ORAL at 21:06

## 2025-04-14 RX ADMIN — Medication 1250 MG: at 18:26

## 2025-04-14 RX ADMIN — ERTAPENEM SODIUM 1 G: 1 INJECTION, POWDER, LYOPHILIZED, FOR SOLUTION INTRAMUSCULAR; INTRAVENOUS at 20:54

## 2025-04-14 ASSESSMENT — ACTIVITIES OF DAILY LIVING (ADL)
ADLS_ACUITY_SCORE: 58

## 2025-04-14 NOTE — PHARMACY-VANCOMYCIN DOSING SERVICE
Pharmacy Vancomycin Initial Note  Date of Service 2025  Patient's  1950  75 year old, female    Indication: Urinary Tract Infection    Current estimated CrCl = Estimated Creatinine Clearance: 36.4 mL/min (A) (based on SCr of 1.42 mg/dL (H)).    Creatinine for last 3 days  2025:  2:10 PM Creatinine 1.42 mg/dL    Recent Vancomycin Level(s) for last 3 days  No results found for requested labs within last 3 days.      Vancomycin IV Administrations (past 72 hours)        No vancomycin orders with administrations in past 72 hours.                    Nephrotoxins and other renal medications (From now, onward)      Start     Dose/Rate Route Frequency Ordered Stop    25  vancomycin (VANCOCIN) 1,250 mg in 0.9% NaCl 250 mL intermittent infusion         1,250 mg  over 90 Minutes Intravenous ONCE 25 174  vancomycin place horvath - receiving intermittent dosing         1 each Intravenous SEE ADMIN INSTRUCTIONS 25              Contrast Orders - past 72 hours (72h ago, onward)      None            Plan:  Start vancomycin  1250 mg IV x1 followed by intermittent dosing based on levels  Vancomycin monitoring method: Trough (Method 2 = manual dose calculation)  Vancomycin therapeutic monitoring goal: 10-15 mg/L  Pharmacy will check vancomycin levels as appropriate in 1-3 Days.    Serum creatinine levels will be ordered daily for the first week of therapy and at least twice weekly for subsequent weeks.      Martine Pathak [facility]

## 2025-04-14 NOTE — ED TRIAGE NOTES
BIBA from home (independent living) for concerns of weakness. Per EMS pt woke up around 0200 to go to the bathroom & felt weak. When pt woke up this morning, she was unable to get out of bed. Family called EMS for transport to the ED. PICC in right arm. PO Zofran given. BG 92. VSS    Hx: frequent UTI's (currently on ABX per EMS), primary biliary cirrhosis s/p liver transplant (2002), paroxysmal atrial fibrillation on Eliquis, CVA, CKD, HTN, DM2, PAD, hypothyroidism     Skylar Spencer, BSN  Shift: 1100 - 2330

## 2025-04-14 NOTE — ED PROVIDER NOTES
"ED Provider Note  [facility]      History     Chief Complaint   Patient presents with    Generalized Weakness     HPI  74yo F pmhx primary biliary cirrhosis s/p liver transplant (2002), paroxysmal A-fib (Eliquis), CVA, CKD, HTN, DM2, PAD, hypothyroidism, and recurrent ESBL UTIs (currently on IV ertapenem) presents from home for generalized weakness and fever (tmax 100.6) x last night.  Patient reports being in her usual state of health yesterday.  Overnight, found herself to be febrile, and was weak when going to the bathroom (did not fall).  This morning when she tried to get out of bed, she felt so weak that she was unable to get herself from bed, so called her daughter.  She otherwise has a largely negative ROS, without URI symptoms, CP, abdominal pain, nausea, vomiting, diarrhea, urinary or vaginal symptoms.  She does endorse a very mild cough.  Chart review shows the patient had an admission in mid March 2025 for UTI and bacteremia, with patient discharged on IV ertapenem.    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     Past Surgical History:   Procedure Laterality Date    APPENDECTOMY  1961    CATARACT IOL, RT/LT      RE12/19/2013, LE12/10/2013 - Toric lenses    CHOLECYSTECTOMY  1991    COLONOSCOPY  03/10/2014    Procedure: COLONOSCOPY;;  Surgeon: Gentry Ramirez MD;  Location: [facility] GI    CYSTOSCOPY      ear drum repair      ENDOBRONCHIAL ULTRASOUND FLEXIBLE N/A 09/29/2017    Procedure: ENDOBRONCHIAL ULTRASOUND FLEXIBLE;  Flexible Bronchoscopy, Endobronchial Ultrasound, Transbronchial Needle Aspiration ;  Surgeon: Eden Clinton MD;  Location: [facility] OR    ENDOSCOPIC RETROGRADE CHOLANGIOPANCREATOGRAM  09/19/2013    Procedure: ENDOSCOPIC RETROGRADE CHOLANGIOPANCREATOGRAM;  Endoscopic Retrograde Cholangiopancreatogram with single balloon enteroscopy, ballon sweep of bile duct;  Surgeon: Brett Membreno MD;  Location: [facility] OR     KNEE SCOPE,MED/LAT MENISECTOMY Right 08/10/2012    partial medial menisectomy only    KNEE SURGERY  1966    R knee    PICC INSERTION  09/18/2013    4fr SL PASV PICC, 40cm (1cm external) in the R basilic vein w/ tip in the low SVC    PICC INSERTION  02/21/2014    5 fr DL BioFlo Navilyst PICC, 46 cm (3 cm external) in the L basilic vein w/ tip in the SVC RA junction.    THYROIDECTOMY  03/2010    TRANSPLANT LIVER RECIPIENT LIVING UNRELATED  05/2002     acetaminophen (TYLENOL) 325 MG tablet  amLODIPine (NORVASC) 2.5 MG tablet  apixaban ANTICOAGULANT (ELIQUIS) 5 MG tablet  calcitRIOL (ROCALTROL) 0.25 MCG capsule  Continuous Glucose Sensor (FREESTYLE NINO  2 SENSOR) MISC  D-MANNOSE PO  diphenhydrAMINE (BENADRYL) 12.5 MG/5ML elixir  EPINEPHrine (ANY BX GENERIC EQUIV) 0.3 MG/0.3ML injection 2-pack  estradiol (ESTRACE) 0.1 MG/GM vaginal cream  evolocumab (REPATHA SURECLICK) 140 MG/ML prefilled autoinjector  ezetimibe (ZETIA) 10 MG tablet  Ferrous Sulfate 324 MG TBEC  folic acid (FOLVITE) 1 MG tablet  gabapentin (NEURONTIN) 250 MG/5ML solution  glucose (BD GLUCOSE) 5 g chewable tablet  hydrALAZINE (APRESOLINE) 50 MG tablet  ketoconazole (NIZORAL) 2 % external cream  ketoconazole (NIZORAL) 2 % external shampoo  levothyroxine (SYNTHROID/LEVOTHROID) 175 MCG tablet  linagliptin (TRADJENTA) 5 MG TABS tablet  meclizine (ANTIVERT) 25 MG tablet  metFORMIN (GLUCOPHAGE XR) 500 MG 24 hr tablet  metoprolol succinate ER (TOPROL XL) 25 MG 24 hr tablet  Multiple Vitamins-Minerals (PRESERVISION AREDS 2) CAPS  Nutrisource Fiber PO packet  omega-3 acid ethyl esters (LOVAZA) 1 g capsule  predniSONE (DELTASONE) 10 MG tablet  sirolimus (GENERIC EQUIVALENT) 1 MG tablet  triamcinolone (KENALOG) 0.1 % external ointment  ursodiol (ACTIGALL) 250 MG tablet      Allergies   Allergen Reactions    Fluconazole Hives and Itching     Full body hives  **Intradermal skin testing negative**  [See intradermal skin testing results from 8/2/2019]    Mycophenolate Diarrhea and Nausea and Vomiting     Patient stated it was chronic and lasted months      Penicillins Anaphylaxis, Hives, Itching and Rash     **Intradermal skin testing negative**  [See intradermal skin testing results from 8/2/2019]      Simvastatin Muscle Pain (Myalgia)     severe  Other reaction(s): Myalgia caused by statin    Methotrexate Other (See Comments)     Other reaction(s): Sore  Sores in mouth, esophagus, and stomach.       Morphine And Codeine Itching and Other (See Comments)     Psych disturbance  Other reaction(s): Confusion, Mood alteration    Quinolones Anxiety, Dizziness, Headache, Other (See Comments), Palpitations and Unknown      Other reaction(s): Hyperactive behavior, Lightheadedness, Mood alteration    Dizzy, light headed    Dizziness, shaky, and jumpy    Benadryl [Diphenhydramine Hcl]      Insomnia     Capsules, Empty Gelatin [Gelatin]     Lansoprazole Diarrhea    Azithromycin Itching     [See intradermal skin testing results from 2019]    Bactrim [Sulfamethoxazole-Trimethoprim] Other (See Comments)     Numb mouth, tingling lips (treated with anti-histamines)    Cephalosporins Itching     [See intradermal skin testing results from 2019]    Ciprofloxacin Hcl Other (See Comments) and Dizziness     Insomnia, mood lability, Irregular heart beat         Doxycycline Itching and Unknown     [See intradermal skin testing results from 2019]    Lisinopril Cough    Omeprazole Itching    Tolectin [Nsaids] Rash    Tolmetin Rash and Itching    Tramadol Rash, Hives and Itching     Family History  Family History   Problem Relation Age of Onset    Hypertension Mother     Endometrial Cancer Mother     Hyperlipidemia Mother     Prostate Cancer Father     Macular Degeneration Father     Cancer - colorectal Maternal Grandmother         in her 80's, has surgery and removal of part of kidney,  at age 98    Heart Disease Maternal Grandfather          at 98    Glaucoma Maternal Grandfather     Cerebrovascular Disease Paternal Grandmother         in her 80's    Hypertension Paternal Grandmother     Heart Disease Paternal Grandfather         MI    Alzheimer Disease Paternal Grandfather     Allergies Son     Neurologic Disorder Daughter         Migraines    Breast Cancer Other     Anesthesia Reaction No family hx of     Crohn's Disease No family hx of     Ulcerative Colitis No family hx of     Melanoma No family hx of     Skin Cancer No family hx of      Social History   Social History     Tobacco Use    Smoking status: Former     Current packs/day: 0.00     Average packs/day: 1 pack/day for 18.0 years (18.0 ttl pk-yrs)     Types: Cigarettes      "Start date: 1967     Quit date: 1985     Years since quittin.0    Smokeless tobacco: Never   Vaping Use    Vaping status: Never Used   Substance Use Topics    Alcohol use: Yes     Alcohol/week: 0.0 standard drinks of alcohol     Comment: rare - \"I toast at weddings\"    Drug use: No      A medically appropriate review of systems was performed with pertinent positives and negatives noted in the HPI, and all other systems negative.    Physical Exam   BP: 139/75  Pulse: 83  Temp: 99.8  F (37.7  C)  Resp: 18  SpO2: 100 %  Physical Exam  Constitutional:       General: She is not in acute distress.     Appearance: Normal appearance. She is not diaphoretic.   HENT:      Head: Atraumatic.        Mouth/Throat:      Mouth: Mucous membranes are moist.   Eyes:      Conjunctiva/sclera: Conjunctivae normal.   Cardiovascular:      Rate and Rhythm: Normal rate and regular rhythm.      Heart sounds: Normal heart sounds.   Pulmonary:      Effort: Pulmonary effort is normal. No respiratory distress.      Breath sounds: Normal breath sounds.   Abdominal:      General: Abdomen is flat.      Palpations: Abdomen is soft.      Tenderness: There is no abdominal tenderness.   Musculoskeletal:      Cervical back: Neck supple.   Skin:     General: Skin is warm.   Neurological:      Mental Status: She is alert.           ED Course, Procedures, & Data      Procedures            EKG Interpretation:      Interpreted by Khanh Elena PA-C  Time reviewed: 1510  Symptoms at time of EKG: Generalized weakness   Rhythm: normal sinus   Rate: normal  Axis: normal  Ectopy: none  Conduction: normal  ST Segments/ T Waves: No ST-T wave changes  Q Waves: none  Comparison to prior: Unchanged    Clinical Impression: normal EKG      IV Antibiotics given and/or elevated Lactate of 0 and no sepsis note found - Delete this reminder and enter the sepsis note or '.edcms' before signing chart.>>>     Results for orders placed or performed during the " hospital encounter of 04/14/25   XR Chest 2 Views     Status: None (Preliminary result)    Impression    RESIDENT PRELIMINARY INTERPRETATION  IMPRESSION:   Similar perihilar patchy opacities with increased left basilar hazy  opacities which may represent increased pulmonary edema versus  atelectasis.   Comprehensive metabolic panel     Status: Abnormal   Result Value Ref Range    Sodium 141 135 - 145 mmol/L    Potassium 3.4 3.4 - 5.3 mmol/L    Carbon Dioxide (CO2) 22 22 - 29 mmol/L    Anion Gap 12 7 - 15 mmol/L    Urea Nitrogen 25.9 (H) 8.0 - 23.0 mg/dL    Creatinine 1.42 (H) 0.51 - 0.95 mg/dL    GFR Estimate 38 (L) >60 mL/min/1.73m2    Calcium 9.0 8.8 - 10.4 mg/dL    Chloride 107 98 - 107 mmol/L    Glucose 94 70 - 99 mg/dL    Alkaline Phosphatase 84 40 - 150 U/L    AST 16 0 - 45 U/L    ALT 12 0 - 50 U/L    Protein Total 5.9 (L) 6.4 - 8.3 g/dL    Albumin 3.3 (L) 3.5 - 5.2 g/dL    Bilirubin Total 0.3 <=1.2 mg/dL   UA with Microscopic reflex to Culture     Status: Abnormal    Specimen: Urine, NOS   Result Value Ref Range    Color Urine Light Yellow Colorless, Straw, Light Yellow, Yellow    Appearance Urine Slightly Cloudy (A) Clear    Glucose Urine Negative Negative mg/dL    Bilirubin Urine Negative Negative    Ketones Urine Negative Negative mg/dL    Specific Gravity Urine 1.015 1.003 - 1.035    Blood Urine Negative Negative    pH Urine 5.5 5.0 - 7.0    Protein Albumin Urine 20 (A) Negative mg/dL    Urobilinogen Urine Normal Normal mg/dL    Nitrite Urine Negative Negative    Leukocyte Esterase Urine Moderate (A) Negative    RBC Urine 2 <=2 /HPF    WBC Urine 85 (H) <=5 /HPF    Squamous Epithelials Urine 7 (H) <=1 /HPF    Transitional Epithelials Urine 1 <=1 /HPF    Narrative    Urine Culture ordered based on laboratory criteria   Troponin T, High Sensitivity     Status: Abnormal   Result Value Ref Range    Troponin T, High Sensitivity 34 (H) <=14 ng/L   Influenza A/B, RSV and SARS-CoV2 PCR (COVID-19) Nasopharyngeal      Status: Normal    Specimen: Nasopharyngeal; Swab   Result Value Ref Range    Influenza A PCR Negative Negative    Influenza B PCR Negative Negative    RSV PCR Negative Negative    SARS CoV2 PCR Negative Negative    Narrative    Testing was performed using the Xpert Xpress CoV2/Flu/RSV Assay on the Cepheid GeneXpert Instrument. This test should be ordered for the detection of SARS-CoV2, influenza, and RSV viruses in individuals with signs and symptoms of respiratory tract infection. This test is for in vitro diagnostic use under the [place] FDA for laboratories certified under CLIA to perform high or moderate complexity testing. This test has been [place] FDA cleared. A negative result does not rule out the presence of PCR inhibitors in the specimen or target RNA in concentration below the limit of detection for the assay. If only one viral target is positive but coinfection with multiple targets is suspected, the sample should be re-tested with another FDA cleared, approved, or authorized test, if coninfection would change clinical management. This test was validated by the [facility]. These laboratories are certified under the Clinical Laboratory Improvement Amendments of 1988 (CLIA-88) as qualified to perfom high complexity laboratory testing.   CBC with platelets and differential     Status: Abnormal   Result Value Ref Range    WBC Count 14.8 (H) 4.0 - 11.0 10e3/uL    RBC Count 3.61 (L) 3.80 - 5.20 10e6/uL    Hemoglobin 9.9 (L) 11.7 - 15.7 g/dL    Hematocrit 31.7 (L) 35.0 - 47.0 %    MCV 88 78 - 100 fL    MCH 27.4 26.5 - 33.0 pg    MCHC 31.2 (L) 31.5 - 36.5 g/dL    RDW 18.7 (H) 10.0 - 15.0 %    Platelet Count 369 150 - 450 10e3/uL    % Neutrophils 73 %    % Lymphocytes 14 %    % Monocytes 11 %    % Eosinophils 1 %    % Basophils 0 %    % Immature Granulocytes 1 %    NRBCs per 100 WBC 0 <1 /100    Absolute Neutrophils 10.8 (H) 1.6 - 8.3 10e3/uL    Absolute Lymphocytes 2.1 0.8 - 5.3 10e3/uL    Absolute  Monocytes 1.6 (H) 0.0 - 1.3 10e3/uL    Absolute Eosinophils 0.1 0.0 - 0.7 10e3/uL    Absolute Basophils 0.0 0.0 - 0.2 10e3/uL    Absolute Immature Granulocytes 0.2 <=0.4 10e3/uL    Absolute NRBCs 0.0 10e3/uL   RBC and Platelet Morphology     Status: Abnormal   Result Value Ref Range    RBC Morphology Confirmed RBC Indices     Platelet Assessment  Automated Count Confirmed. Platelet morphology is normal.     Automated Count Confirmed. Platelet morphology is normal.    [place] Cells Slight (A) None Seen    Elliptocytes Slight (A) None Seen   Troponin T, High Sensitivity     Status: Abnormal   Result Value Ref Range    Troponin T, High Sensitivity 38 (H) <=14 ng/L   Extra Tube     Status: None (In process)    Narrative    The following orders were created for panel order Extra Tube.  Procedure                               Abnormality         Status                     ---------                               -----------         ------                     Extra Purple Top Tube[[id number]]                           In process                   Please view results for these tests on the individual orders.   EKG 12 lead     Status: None   Result Value Ref Range    Systolic Blood Pressure  mmHg    Diastolic Blood Pressure  mmHg    Ventricular Rate 85 BPM    Atrial Rate 85 BPM    NM Interval 192 ms    QRS Duration 72 ms     ms    QTc 409 ms    P Axis 72 degrees    R AXIS 27 degrees    T Axis 58 degrees    Interpretation ECG       Sinus rhythm  Low voltage QRS  Borderline ECG    Unconfirmed report - interpretation of this ECG is computer generated - see medical record for final interpretation  Confirmed by - EMERGENCY ROOM, PHYSICIAN ([id number]),  JOHN ARGUELLES ([id number]) on 4/14/2025 3:22:42 PM     CBC with platelets differential     Status: Abnormal    Narrative    The following orders were created for panel order CBC with platelets differential.  Procedure                               Abnormality         Status                      ---------                               -----------         ------                     CBC with platelets and ...[[id number]]  Abnormal            Final result               RBC and Platelet Morpho...[[id number]]  Abnormal            Final result                 Please view results for these tests on the individual orders.     Medications   vancomycin (VANCOCIN) 1,250 mg in 0.9% NaCl 250 mL intermittent infusion (1,250 mg Intravenous $New Bag 4/14/25 8925)   vancomycin place horvath - receiving intermittent dosing (has no administration in time range)   ertapenem (INVanz) 1 g vial to attach to  mL bag (has no administration in time range)     Labs Ordered and Resulted from Time of ED Arrival to Time of ED Departure   COMPREHENSIVE METABOLIC PANEL - Abnormal       Result Value    Sodium 141      Potassium 3.4      Carbon Dioxide (CO2) 22      Anion Gap 12      Urea Nitrogen 25.9 (*)     Creatinine 1.42 (*)     GFR Estimate 38 (*)     Calcium 9.0      Chloride 107      Glucose 94      Alkaline Phosphatase 84      AST 16      ALT 12      Protein Total 5.9 (*)     Albumin 3.3 (*)     Bilirubin Total 0.3     ROUTINE UA WITH MICROSCOPIC REFLEX TO CULTURE - Abnormal    Color Urine Light Yellow      Appearance Urine Slightly Cloudy (*)     Glucose Urine Negative      Bilirubin Urine Negative      Ketones Urine Negative      Specific Gravity Urine 1.015      Blood Urine Negative      pH Urine 5.5      Protein Albumin Urine 20 (*)     Urobilinogen Urine Normal      Nitrite Urine Negative      Leukocyte Esterase Urine Moderate (*)     RBC Urine 2      WBC Urine 85 (*)     Squamous Epithelials Urine 7 (*)     Transitional Epithelials Urine 1     TROPONIN T, HIGH SENSITIVITY - Abnormal    Troponin T, High Sensitivity 34 (*)    CBC WITH PLATELETS AND DIFFERENTIAL - Abnormal    WBC Count 14.8 (*)     RBC Count 3.61 (*)     Hemoglobin 9.9 (*)     Hematocrit 31.7 (*)     MCV 88      MCH 27.4      MCHC 31.2 (*)      RDW 18.7 (*)     Platelet Count 369      % Neutrophils 73      % Lymphocytes 14      % Monocytes 11      % Eosinophils 1      % Basophils 0      % Immature Granulocytes 1      NRBCs per 100 WBC 0      Absolute Neutrophils 10.8 (*)     Absolute Lymphocytes 2.1      Absolute Monocytes 1.6 (*)     Absolute Eosinophils 0.1      Absolute Basophils 0.0      Absolute Immature Granulocytes 0.2      Absolute NRBCs 0.0     RBC AND PLATELET MORPHOLOGY - Abnormal    RBC Morphology Confirmed RBC Indices      Platelet Assessment        Value: Automated Count Confirmed. Platelet morphology is normal.    [place] Cells Slight (*)     Elliptocytes Slight (*)    TROPONIN T, HIGH SENSITIVITY - Abnormal    Troponin T, High Sensitivity 38 (*)    INFLUENZA A/B, RSV AND SARS-COV2 PCR - Normal    Influenza A PCR Negative      Influenza B PCR Negative      RSV PCR Negative      SARS CoV2 PCR Negative     NT PROBNP INPATIENT   BLOOD CULTURE   BLOOD CULTURE   URINE CULTURE     XR Chest 2 Views   Preliminary Result   RESIDENT PRELIMINARY INTERPRETATION   IMPRESSION:    Similar perihilar patchy opacities with increased left basilar hazy   opacities which may represent increased pulmonary edema versus   atelectasis.             Critical care was not performed.     Medical Decision Making  The patient's presentation was of high complexity (an acute health issue posing potential threat to life or bodily function).    The patient's evaluation involved:  review of external note(s) from 3+ sources (see separate area of note for details)  review of 3+ test result(s) ordered prior to this encounter (see separate area of note for details)  ordering and/or review of 3+ test(s) in this encounter (see separate area of note for details)  discussion of management or test interpretation with another health professional (see separate area of note for details)    The patient's management necessitated high risk (a decision regarding  hospitalization).    Assessment & Plan    74yo F pmhx primary biliary cirrhosis s/p liver transplant (2002), paroxysmal A-fib (Eliquis), CVA, SCC of face, CKD, HTN, DM2, PAD, hypothyroidism, and recurrent ESBL UTIs (currently on IV ertapenem) presents from home for generalized weakness and fever (tmax 100.6) x last night.  ROS otherwise negative.  Recent note from ID reporting 2 additional weeks of IV ertapenem.  In ED patient is HDS/AF.  Abdomen benign.  Lungs clear.  She has a subacute Mohs scar on her left cheek does not appear acutely infected.  DDx UTI versus PICC line infection versus versus PNA versus COVID versus flu versus bacteremia versus less likely intra-abdominal infection/acute abdomen versus other    Labs today show a leukocytosis of 14.8.  Baseline normocytic anemia.  CMP with better than baseline CKD, otherwise unremarkable.  COVID, flu, RSV negative.  Troponin elevated at 38, but this appears consistent with patient previous, CP, nonischemic EKG.  CXR showing similar perihilar patchy opacities with increased left basilar hazy opacities which may represent increased pulmonary edema versus atelectasis. BNP 1400 which is roughly at her baseline, and pt without SOB to suggest acute CHF exacerbation.  Ultimately, UA with 85 whites, moderate leukocyte esterase.  Blood cultures pending.  Patient's abdomen benign and no abdominal pain to suggest intra-abdominal pathology.  With infectious workup otherwise unremarkable, concern at this point is that patient continues with/has new UTI despite her ertapenem versus PICC infection.  Discussed with Pharm.D., will add Vanco for coverage, and plan to admit pending culture data.          I have reviewed the nursing notes. I have reviewed the findings, diagnosis, plan and need for follow up with the patient.    New Prescriptions    No medications on file       Final diagnoses:   UTI (urinary tract infection)   H/O liver transplant (H)         Khanh Elena,  MIKE ALY [facility] EMERGENCY DEPARTMENT  4/14/2025     Khanh Elena PA-C  04/14/25 2035

## 2025-04-14 NOTE — TELEPHONE ENCOUNTER
Left a detailed voice message explaining that the tumor debulk came back with perineural invasion and along with the tumor size of greater than 2 cm is upstaged and imagining is needed for further staging and then will be presented at Tumor board to determine if radiation therapy is appropriate.    Orders for imaging PET and CT of head and neck have been put in.    Dr Peters

## 2025-04-14 NOTE — TELEPHONE ENCOUNTER
3/24/25, 3/31/25 medication reconciled and 4/8/25 SKILLED NURSING orders received via fax. Form in your mailbox to be signed.

## 2025-04-15 ENCOUNTER — APPOINTMENT (OUTPATIENT)
Dept: PHYSICAL THERAPY | Facility: CLINIC | Age: 75
End: 2025-04-15
Payer: MEDICARE

## 2025-04-15 ENCOUNTER — PATIENT OUTREACH (OUTPATIENT)
Dept: CARE COORDINATION | Facility: CLINIC | Age: 75
End: 2025-04-15

## 2025-04-15 ENCOUNTER — APPOINTMENT (OUTPATIENT)
Dept: ULTRASOUND IMAGING | Facility: CLINIC | Age: 75
End: 2025-04-15
Payer: MEDICARE

## 2025-04-15 ENCOUNTER — TELEPHONE (OUTPATIENT)
Dept: INTERNAL MEDICINE | Facility: CLINIC | Age: 75
End: 2025-04-15

## 2025-04-15 ENCOUNTER — TELEPHONE (OUTPATIENT)
Dept: INFECTIOUS DISEASES | Facility: CLINIC | Age: 75
End: 2025-04-15

## 2025-04-15 DIAGNOSIS — N39.0 URINARY TRACT INFECTION: ICD-10-CM

## 2025-04-15 LAB
ADV 40+41 DNA STL QL NAA+NON-PROBE: NEGATIVE
ALBUMIN SERPL BCG-MCNC: 2.8 G/DL (ref 3.5–5.2)
ALP SERPL-CCNC: 74 U/L (ref 40–150)
ALT SERPL W P-5'-P-CCNC: 12 U/L (ref 0–50)
ANION GAP SERPL CALCULATED.3IONS-SCNC: 11 MMOL/L (ref 7–15)
AST SERPL W P-5'-P-CCNC: 17 U/L (ref 0–45)
ASTRO TYP 1-8 RNA STL QL NAA+NON-PROBE: NEGATIVE
BACTERIA UR CULT: NORMAL
BILIRUB SERPL-MCNC: 0.3 MG/DL
BUN SERPL-MCNC: 26.6 MG/DL (ref 8–23)
C CAYETANENSIS DNA STL QL NAA+NON-PROBE: NEGATIVE
CALCIUM SERPL-MCNC: 8.4 MG/DL (ref 8.8–10.4)
CAMPYLOBACTER DNA SPEC NAA+PROBE: NEGATIVE
CHLORIDE SERPL-SCNC: 107 MMOL/L (ref 98–107)
CREAT SERPL-MCNC: 1.69 MG/DL (ref 0.51–0.95)
CRYPTOSP DNA STL QL NAA+NON-PROBE: NEGATIVE
E COLI O157 DNA STL QL NAA+NON-PROBE: NORMAL
E HISTOLYT DNA STL QL NAA+NON-PROBE: NEGATIVE
EAEC ASTA GENE ISLT QL NAA+PROBE: NEGATIVE
EC STX1+STX2 GENES STL QL NAA+NON-PROBE: NEGATIVE
EGFRCR SERPLBLD CKD-EPI 2021: 31 ML/MIN/1.73M2
EPEC EAE GENE STL QL NAA+NON-PROBE: NEGATIVE
ERYTHROCYTE [DISTWIDTH] IN BLOOD BY AUTOMATED COUNT: 18.7 % (ref 10–15)
ERYTHROCYTE [SEDIMENTATION RATE] IN BLOOD BY WESTERGREN METHOD: 61 MM/HR (ref 0–30)
EST. AVERAGE GLUCOSE BLD GHB EST-MCNC: 148 MG/DL
ETEC LTA+ST1A+ST1B TOX ST NAA+NON-PROBE: NEGATIVE
G LAMBLIA DNA STL QL NAA+NON-PROBE: NEGATIVE
GLUCOSE BLDC GLUCOMTR-MCNC: 110 MG/DL (ref 70–99)
GLUCOSE BLDC GLUCOMTR-MCNC: 197 MG/DL (ref 70–99)
GLUCOSE BLDC GLUCOMTR-MCNC: 229 MG/DL (ref 70–99)
GLUCOSE SERPL-MCNC: 103 MG/DL (ref 70–99)
HBA1C MFR BLD: 6.8 %
HCO3 SERPL-SCNC: 23 MMOL/L (ref 22–29)
HCT VFR BLD AUTO: 30.3 % (ref 35–47)
HGB BLD-MCNC: 9.2 G/DL (ref 11.7–15.7)
MCH RBC QN AUTO: 27.3 PG (ref 26.5–33)
MCHC RBC AUTO-ENTMCNC: 30.4 G/DL (ref 31.5–36.5)
MCV RBC AUTO: 90 FL (ref 78–100)
MRSA DNA SPEC QL NAA+PROBE: NEGATIVE
NOROVIRUS GI+II RNA STL QL NAA+NON-PROBE: NEGATIVE
P SHIGELLOIDES DNA STL QL NAA+NON-PROBE: NEGATIVE
PLATELET # BLD AUTO: 331 10E3/UL (ref 150–450)
POTASSIUM SERPL-SCNC: 3.4 MMOL/L (ref 3.4–5.3)
PROT SERPL-MCNC: 5.3 G/DL (ref 6.4–8.3)
RBC # BLD AUTO: 3.37 10E6/UL (ref 3.8–5.2)
RVA RNA STL QL NAA+NON-PROBE: NEGATIVE
SA TARGET DNA: NEGATIVE
SALMONELLA SP RPOD STL QL NAA+PROBE: NEGATIVE
SAPO I+II+IV+V RNA STL QL NAA+NON-PROBE: NEGATIVE
SHIGELLA SP+EIEC IPAH ST NAA+NON-PROBE: NEGATIVE
SODIUM SERPL-SCNC: 141 MMOL/L (ref 135–145)
V CHOLERAE DNA SPEC QL NAA+PROBE: NEGATIVE
VANCOMYCIN SERPL-MCNC: 13.1 UG/ML
VIBRIO DNA SPEC NAA+PROBE: NEGATIVE
WBC # BLD AUTO: 15.1 10E3/UL (ref 4–11)
Y ENTEROCOL DNA STL QL NAA+PROBE: NEGATIVE

## 2025-04-15 PROCEDURE — 99222 1ST HOSP IP/OBS MODERATE 55: CPT | Mod: 24 | Performed by: NURSE PRACTITIONER

## 2025-04-15 PROCEDURE — 85652 RBC SED RATE AUTOMATED: CPT

## 2025-04-15 PROCEDURE — 82947 ASSAY GLUCOSE BLOOD QUANT: CPT

## 2025-04-15 PROCEDURE — 250N000011 HC RX IP 250 OP 636

## 2025-04-15 PROCEDURE — 36592 COLLECT BLOOD FROM PICC: CPT

## 2025-04-15 PROCEDURE — 250N000012 HC RX MED GY IP 250 OP 636 PS 637

## 2025-04-15 PROCEDURE — 258N000003 HC RX IP 258 OP 636: Performed by: INTERNAL MEDICINE

## 2025-04-15 PROCEDURE — 250N000013 HC RX MED GY IP 250 OP 250 PS 637

## 2025-04-15 PROCEDURE — 99222 1ST HOSP IP/OBS MODERATE 55: CPT | Mod: 24 | Performed by: INTERNAL MEDICINE

## 2025-04-15 PROCEDURE — 87507 IADNA-DNA/RNA PROBE TQ 12-25: CPT

## 2025-04-15 PROCEDURE — 80202 ASSAY OF VANCOMYCIN: CPT | Performed by: INTERNAL MEDICINE

## 2025-04-15 PROCEDURE — 93970 EXTREMITY STUDY: CPT | Mod: 26 | Performed by: RADIOLOGY

## 2025-04-15 PROCEDURE — 250N000011 HC RX IP 250 OP 636: Performed by: INTERNAL MEDICINE

## 2025-04-15 PROCEDURE — 85014 HEMATOCRIT: CPT

## 2025-04-15 PROCEDURE — 36415 COLL VENOUS BLD VENIPUNCTURE: CPT | Performed by: INTERNAL MEDICINE

## 2025-04-15 PROCEDURE — 87641 MR-STAPH DNA AMP PROBE: CPT

## 2025-04-15 PROCEDURE — 99233 SBSQ HOSP IP/OBS HIGH 50: CPT | Mod: GC | Performed by: INTERNAL MEDICINE

## 2025-04-15 PROCEDURE — 93970 EXTREMITY STUDY: CPT

## 2025-04-15 PROCEDURE — 82962 GLUCOSE BLOOD TEST: CPT

## 2025-04-15 PROCEDURE — 97530 THERAPEUTIC ACTIVITIES: CPT | Mod: GP

## 2025-04-15 PROCEDURE — 250N000011 HC RX IP 250 OP 636: Mod: JZ

## 2025-04-15 PROCEDURE — G0545 PR INHRENT VISIT TO INPT/OBS W CNFRM/SUSPCT INFCT DIS BY INFCT DIS SPCIALST: HCPCS | Performed by: INTERNAL MEDICINE

## 2025-04-15 PROCEDURE — 120N000011 HC R&B TRANSPLANT UMMC

## 2025-04-15 PROCEDURE — 97161 PT EVAL LOW COMPLEX 20 MIN: CPT | Mod: GP

## 2025-04-15 RX ORDER — AMOXICILLIN 250 MG
2 CAPSULE ORAL 2 TIMES DAILY PRN
Status: DISCONTINUED | OUTPATIENT
Start: 2025-04-15 | End: 2025-04-23 | Stop reason: HOSPADM

## 2025-04-15 RX ORDER — ONDANSETRON 4 MG/1
4 TABLET, ORALLY DISINTEGRATING ORAL EVERY 6 HOURS PRN
Status: DISCONTINUED | OUTPATIENT
Start: 2025-04-15 | End: 2025-04-23 | Stop reason: HOSPADM

## 2025-04-15 RX ORDER — POLYETHYLENE GLYCOL 3350 17 G/17G
17 POWDER, FOR SOLUTION ORAL DAILY
Status: DISCONTINUED | OUTPATIENT
Start: 2025-04-15 | End: 2025-04-23 | Stop reason: HOSPADM

## 2025-04-15 RX ORDER — FERROUS SULFATE 325(65) MG
325 TABLET ORAL 2 TIMES DAILY PRN
Status: DISCONTINUED | OUTPATIENT
Start: 2025-04-15 | End: 2025-04-23 | Stop reason: HOSPADM

## 2025-04-15 RX ORDER — AMOXICILLIN 250 MG
1 CAPSULE ORAL 2 TIMES DAILY PRN
Status: DISCONTINUED | OUTPATIENT
Start: 2025-04-15 | End: 2025-04-23 | Stop reason: HOSPADM

## 2025-04-15 RX ORDER — OXYCODONE HYDROCHLORIDE 5 MG/1
5 TABLET ORAL EVERY 6 HOURS PRN
Status: DISCONTINUED | OUTPATIENT
Start: 2025-04-15 | End: 2025-04-23 | Stop reason: HOSPADM

## 2025-04-15 RX ORDER — DIPHENHYDRAMINE HYDROCHLORIDE 50 MG/ML
25 INJECTION, SOLUTION INTRAMUSCULAR; INTRAVENOUS EVERY 6 HOURS PRN
Status: DISCONTINUED | OUTPATIENT
Start: 2025-04-15 | End: 2025-04-23 | Stop reason: HOSPADM

## 2025-04-15 RX ORDER — GABAPENTIN 250 MG/5ML
300 SOLUTION ORAL AT BEDTIME
Status: DISCONTINUED | OUTPATIENT
Start: 2025-04-15 | End: 2025-04-23 | Stop reason: HOSPADM

## 2025-04-15 RX ORDER — EZETIMIBE 10 MG/1
10 TABLET ORAL DAILY
Status: DISCONTINUED | OUTPATIENT
Start: 2025-04-15 | End: 2025-04-23 | Stop reason: HOSPADM

## 2025-04-15 RX ORDER — ONDANSETRON 2 MG/ML
4 INJECTION INTRAMUSCULAR; INTRAVENOUS EVERY 6 HOURS PRN
Status: DISCONTINUED | OUTPATIENT
Start: 2025-04-15 | End: 2025-04-23 | Stop reason: HOSPADM

## 2025-04-15 RX ORDER — DEXTROSE MONOHYDRATE 25 G/50ML
25-50 INJECTION, SOLUTION INTRAVENOUS
Status: DISCONTINUED | OUTPATIENT
Start: 2025-04-15 | End: 2025-04-23 | Stop reason: HOSPADM

## 2025-04-15 RX ORDER — NICOTINE POLACRILEX 4 MG
15-30 LOZENGE BUCCAL
Status: DISCONTINUED | OUTPATIENT
Start: 2025-04-15 | End: 2025-04-23 | Stop reason: HOSPADM

## 2025-04-15 RX ORDER — SIROLIMUS 0.5 MG/1
1 TABLET, FILM COATED ORAL DAILY
Status: DISCONTINUED | OUTPATIENT
Start: 2025-04-15 | End: 2025-04-23 | Stop reason: HOSPADM

## 2025-04-15 RX ORDER — NICOTINE POLACRILEX 4 MG
15-30 LOZENGE BUCCAL
Status: DISCONTINUED | OUTPATIENT
Start: 2025-04-15 | End: 2025-04-15

## 2025-04-15 RX ORDER — LOPERAMIDE HYDROCHLORIDE 2 MG/1
2 CAPSULE ORAL 4 TIMES DAILY PRN
Status: DISCONTINUED | OUTPATIENT
Start: 2025-04-15 | End: 2025-04-23 | Stop reason: HOSPADM

## 2025-04-15 RX ORDER — DIPHENHYDRAMINE HCL 25 MG
25 CAPSULE ORAL EVERY 6 HOURS PRN
Status: DISCONTINUED | OUTPATIENT
Start: 2025-04-15 | End: 2025-04-23 | Stop reason: HOSPADM

## 2025-04-15 RX ORDER — METOPROLOL SUCCINATE 25 MG/1
25 TABLET, EXTENDED RELEASE ORAL DAILY
Status: DISCONTINUED | OUTPATIENT
Start: 2025-04-15 | End: 2025-04-23 | Stop reason: HOSPADM

## 2025-04-15 RX ORDER — ACETAMINOPHEN 325 MG/1
650 TABLET ORAL EVERY 6 HOURS PRN
Status: DISCONTINUED | OUTPATIENT
Start: 2025-04-15 | End: 2025-04-23 | Stop reason: HOSPADM

## 2025-04-15 RX ORDER — AMLODIPINE BESYLATE 2.5 MG/1
2.5 TABLET ORAL DAILY
Status: DISCONTINUED | OUTPATIENT
Start: 2025-04-15 | End: 2025-04-19

## 2025-04-15 RX ORDER — METFORMIN HYDROCHLORIDE 500 MG/1
500 TABLET, EXTENDED RELEASE ORAL
Status: DISCONTINUED | OUTPATIENT
Start: 2025-04-15 | End: 2025-04-19

## 2025-04-15 RX ORDER — PREDNISONE 10 MG/1
10 TABLET ORAL DAILY
Status: DISCONTINUED | OUTPATIENT
Start: 2025-04-15 | End: 2025-04-17

## 2025-04-15 RX ORDER — LIDOCAINE 40 MG/G
CREAM TOPICAL
Status: DISCONTINUED | OUTPATIENT
Start: 2025-04-15 | End: 2025-04-23 | Stop reason: HOSPADM

## 2025-04-15 RX ORDER — DEXTROSE MONOHYDRATE 25 G/50ML
25-50 INJECTION, SOLUTION INTRAVENOUS
Status: DISCONTINUED | OUTPATIENT
Start: 2025-04-15 | End: 2025-04-15

## 2025-04-15 RX ORDER — URSODIOL 250 MG/1
250 TABLET, FILM COATED ORAL 2 TIMES DAILY
Status: DISCONTINUED | OUTPATIENT
Start: 2025-04-15 | End: 2025-04-23 | Stop reason: HOSPADM

## 2025-04-15 RX ORDER — ERTAPENEM 1 G/1
1 INJECTION, POWDER, LYOPHILIZED, FOR SOLUTION INTRAMUSCULAR; INTRAVENOUS EVERY 24 HOURS
Status: DISCONTINUED | OUTPATIENT
Start: 2025-04-15 | End: 2025-04-23 | Stop reason: HOSPADM

## 2025-04-15 RX ORDER — HYDRALAZINE HYDROCHLORIDE 25 MG/1
50 TABLET, FILM COATED ORAL 3 TIMES DAILY
Status: DISCONTINUED | OUTPATIENT
Start: 2025-04-15 | End: 2025-04-19

## 2025-04-15 RX ADMIN — VANCOMYCIN HYDROCHLORIDE 750 MG: 1 INJECTION, POWDER, LYOPHILIZED, FOR SOLUTION INTRAVENOUS at 11:43

## 2025-04-15 RX ADMIN — LEVOTHYROXINE SODIUM 175 MCG: 0.03 TABLET ORAL at 08:20

## 2025-04-15 RX ADMIN — METOPROLOL SUCCINATE 25 MG: 25 TABLET, EXTENDED RELEASE ORAL at 08:20

## 2025-04-15 RX ADMIN — APIXABAN 5 MG: 5 TABLET, FILM COATED ORAL at 20:08

## 2025-04-15 RX ADMIN — GABAPENTIN 300 MG: 250 SOLUTION ORAL at 23:03

## 2025-04-15 RX ADMIN — ACETAMINOPHEN 650 MG: 325 TABLET, FILM COATED ORAL at 06:45

## 2025-04-15 RX ADMIN — GABAPENTIN 300 MG: 250 SOLUTION ORAL at 08:20

## 2025-04-15 RX ADMIN — EZETIMIBE 10 MG: 10 TABLET ORAL at 08:20

## 2025-04-15 RX ADMIN — URSODIOL 250 MG: 250 TABLET ORAL at 20:08

## 2025-04-15 RX ADMIN — ERTAPENEM SODIUM 1 G: 1 INJECTION, POWDER, LYOPHILIZED, FOR SOLUTION INTRAMUSCULAR; INTRAVENOUS at 23:05

## 2025-04-15 RX ADMIN — DIPHENHYDRAMINE HYDROCHLORIDE 25 MG: 50 INJECTION, SOLUTION INTRAMUSCULAR; INTRAVENOUS at 23:11

## 2025-04-15 RX ADMIN — SIROLIMUS 1 MG: 1 TABLET, FILM COATED ORAL at 08:20

## 2025-04-15 RX ADMIN — APIXABAN 5 MG: 5 TABLET, FILM COATED ORAL at 08:20

## 2025-04-15 RX ADMIN — PREDNISONE 10 MG: 10 TABLET ORAL at 08:20

## 2025-04-15 RX ADMIN — URSODIOL 250 MG: 250 TABLET ORAL at 08:20

## 2025-04-15 ASSESSMENT — ACTIVITIES OF DAILY LIVING (ADL)
ADLS_ACUITY_SCORE: 64
ADLS_ACUITY_SCORE: 54
ADLS_ACUITY_SCORE: 64
ADLS_ACUITY_SCORE: 63
ADLS_ACUITY_SCORE: 64
ADLS_ACUITY_SCORE: 64
ADLS_ACUITY_SCORE: 61
ADLS_ACUITY_SCORE: 60
ADLS_ACUITY_SCORE: 64
DEPENDENT_IADLS:: CLEANING;COOKING;LAUNDRY
ADLS_ACUITY_SCORE: 64
ADLS_ACUITY_SCORE: 63
ADLS_ACUITY_SCORE: 63
ADLS_ACUITY_SCORE: 64
ADLS_ACUITY_SCORE: 63
ADLS_ACUITY_SCORE: 64
ADLS_ACUITY_SCORE: 61
ADLS_ACUITY_SCORE: 63
ADLS_ACUITY_SCORE: 64
ADLS_ACUITY_SCORE: 64
ADLS_ACUITY_SCORE: 63

## 2025-04-15 NOTE — PROGRESS NOTES
[facility]    Medicine Progress Note - Medicine Service, JOVANNY TEAM 3    Date of Admission:  4/14/2025    Assessment & Plan      76 yo w/ PMHx of primary biliary cirrhosis s/p liver transplant in 2002, paroxysmal atrial fibrillation on apixaban, HTN, T2DM, CKD, HF, hx of CVA, hypothyroidism, recurrent ESBL UTIs with recent E. Faecalis bacteremia admission last month on IV ertapenem via PICC admitted for generalized weakness likely 2/2 infection    Today:  Follow-up liver transplant recs  Follow-up transplant ID recs  PT/OT consult  Cont vanc and ertapenem      #generalized weakness likely 2/2 bacteremia  #recurrent UTIs on IV ertapenem since 3/9/2024  #recent E. Faecalis bacteremia  Afebrile and hemodynamically stable with WBC 14.8 on admit though with reported fevers and lethargy. Given UA improved from prior with some leukocyte esterases, consideration for bacteremia 2/2 PICC line over recurrent UTI/pyelonephritis. Reports suprapubic tenderness. CXR w/ mild pulmonary edema. Recent admission in March 2025 for Klebsiella pneumonia UTI and E. Faecalis bacteremia with recommendations from ID for ertapenem 1g q24h until EOT 4/22. RVP negative. Per pt missed ertapenem from 4/8-4/13.  Abx:   vancomycin: 4/14 - **  ertapenem: 3/9 - 4/8, 4/14 - **  meropenem: 3/8 - 3/9  Cultures:  PICC line blood cultures, pending  Urine culture, pending  bladder scan, pend  Transplant ID consulted  PT/OT consult    #Liver transplant in 2002 2/2 primary biliary cirrhosis  Follows Dr. Ramirez. Has missed 2 days of medication.  PTA sirolimus 1 mg qD  PTA prednisone 10 mg qD  PTA ursodiol 250 mg BID  GI/Liver transplant consult, appreciate recs    #CKD3b w/ moderate proteinuria, at baseline  #HTN  #?heart failure  Baseline Cr. 1.5. History of dialysis at time of liver transplant 23 years ago. Recurrent AKIs and immunosuppressive medication toxicity. Not on SGLT2i 2/2 recurrent UTI. Chart review w/  unclear heart failure history, unconfirmed with EF 60-65% on 3/10/25. Encourage oral intake for now. BNP 1446 elevated from 1 month ago. Given unclear HF history, trace bilateral LE edema, with some pulm edema noted on imaging, consideration for volume overload. However, without orthopnea or clear cough. Will defer diuresis.  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 Wound does not look infected.  CTM    #anemia, chronic  Hgb 9.9 on admit. Stable.  HOLD PTA ferrous sulfate    #hypothyroidism  PTA levothyroxine 175mcg qD          Diet: Regular Diet Adult    DVT Prophylaxis: DOAC  Ron Catheter: Not present  Lines: None     Cardiac Monitoring: None  Code Status: Full Code      Clinically Significant Risk Factors Present on Admission               # Hypoalbuminemia: Lowest albumin = 2.8 g/dL at 4/15/2025  6:13 AM, will monitor as appropriate  # Drug Induced Coagulation Defect: home medication list includes an anticoagulant medication    # Hypertension: Noted on problem list      # Anemia: based on hgb <11      # DMII: A1C = 6.8 % (Ref range: <5.7 %) within past 6 months        # Financial/Environmental Concerns:           Social Drivers of Health    Housing Stability: Low Risk  (3/12/2025)    Housing Stability     Do you have housing? : Yes     Are you worried about losing your housing?: No   Recent Concern: Housing Stability - High Risk (1/22/2025)    Housing Stability     Do you have housing? : No     Are you worried about losing your housing?: No   Tobacco Use:  Medium Risk (4/8/2025)    Patient History     Smoking Tobacco Use: Former     Smokeless Tobacco Use: Never   Transportation Needs: Low Risk  (3/12/2025)    Transportation Needs     Within the past 12 months, has lack of transportation kept you from medical appointments, getting your medicines, non-medical meetings or appointments, work, or from getting things that you need?: No   Recent Concern: Transportation Needs - High Risk (1/27/2025)    Transportation Needs     Within the past 12 months, has lack of transportation kept you from medical appointments, getting your medicines, non-medical meetings or appointments, work, or from getting things that you need?: Yes   Physical Activity: Insufficiently Active (11/9/2023)    Received from [employer]    Exercise Vital Sign     Days of Exercise per Week: 5 days     Minutes of Exercise per Session: 10 min          Disposition Plan     Medically Ready for Discharge: Anticipated in 2-4 Days           Staffed with Dr. Luz.    Gentry Umanzor MD  Medicine Service, [facility] TEAM 3  [facility]  Securely message with [employer] (more info)  Text page via [facility] Paging/Directory   See signed in provider for up to date coverage information  ______________________________________________________________________    Interval History   Naeon. Feeling better today. States had not had IV abx since 4/8. Described weakness as more related to stiffness making it hard to get around. Denies any cp, sob, dysuria, hematuria, fevers, chills, abd pain, diarrhea, or constipation.    Physical Exam   Vital Signs: Temp: 98.1  F (36.7  C) Temp src: Oral BP: 114/42 Pulse: 69   Resp: 18 SpO2: 98 % O2 Device: Nasal cannula Oxygen Delivery: 1 LPM  Weight: 0 lbs 0 oz    Constitutional: awake, alert, NAD, eating breakfast  Eyes: Lids and lashes normal  HENT: NCAT, healing mohs surgery on L cheek  Resp: No increased WOB, CTAB, no w/r/rh  Card: RRR, normal S1 and S2,   no m/r/g  Extrem: Pulses equal throughout, no LE edema  GI: BS normal, soft, non-distended, NTTP, no masses  Skin/integ: no erythema or rash, no jaundice  Neuro: cranial nerves II-XII are grossly intact, uses hearing aids  Neuropsych: alert, answering questions appropriately, interactive, affect full      Medical Decision Making             Data     I have personally reviewed the following data over the past 24 hrs:    15.1 (H)  \   9.2 (L)   / 331     141 107 26.6 (H) /  103 (H)   3.4 23 1.69 (H) \     ALT: 12 AST: 17 AP: 74 TBILI: 0.3   ALB: 2.8 (L) TOT PROTEIN: 5.3 (L) LIPASE: N/A     Trop: 38 (H) BNP: 1,446     TSH: N/A T4: N/A A1C: 6.8 (H)     Procal: N/A CRP: 21.40 (H) Lactic Acid: N/A         Imaging results reviewed over the past 24 hrs:   Recent Results (from the past 24 hours)   XR Chest 2 Views    Narrative    EXAM: XR CHEST 2 VIEWS 4/14/2025 4:50 PM    INDICATION: Infectious work up    COMPARISON: Chest radiograph 3/8/2025    FINDINGS:   Metallic apparatus projecting over the aortic knob. Additional tubular  device in stable position projecting over the mid lung. Similar patchy  perihilar and increased left basilar hazy opacities. Trachea is  midline. Cardiac silhouette is stable. Upper abdomen unremarkable.      Impression    IMPRESSION:   Similar mild pulmonary edema and basilar atelectasis.    I have personally reviewed the examination and initial interpretation  and I agree with the findings.    OLIVIA CHAPMAN DO         SYSTEM ID:  [id number]

## 2025-04-15 NOTE — TELEPHONE ENCOUNTER
[facility] calling to say patient missed nursing visit yesterday due to she was admitted to the [facility] for a UTI.    [facility] [phone]

## 2025-04-15 NOTE — PLAN OF CARE
Goal Outcome Evaluation:  /64 (BP Location: Left arm)   Pulse 70   Temp 98.3  F (36.8  C) (Oral)   Resp 20   LMP 06/01/1988 (Approximate)   SpO2 100%     PT noted to be comfortable on this shift, VSS, is on 1L via NC, did c/o generalized pain PRN tylenol administer. PT is on IV intermittent ABX, no adverse effects noted. PT is occasionally incontinent of bladder and bowel. BS check done, stool sample collected. Cont POC.

## 2025-04-15 NOTE — PLAN OF CARE
Goal Outcome Evaluation:      Plan of Care Reviewed With: patient    Overall Patient Progress: improvingOverall Patient Progress: improving    Outcome Evaluation: Discharge home with [facility] home PT/OT    HUMA Chacon, LGSW  ED/OBS   [facility]  Phone: [phone]  Pager: [phone]  Fax: [phone]     After hours [employer] and After Hours [employer] [place] [employer]     On-call pager, [phone], 4:00 pm to 8:30 pm

## 2025-04-15 NOTE — CONSULTS
[facility] EMERGENCY DEPARTMENT  [address] [address]  Phone: [phone]    Patient:  Luz Thompson, Date of birth 1950  MRN: [id number]  4/14/2025  Date of Visit:  04/15/2025  Referring Provider Thomas Luz  Reason for consult: fever      Assessment & Plan    Recommendations:   If she has ongoing loose stool send for C diff testing  Continue ertapenem 1 gram IV every 24 hours  Continue vancomycin with pharmacy to dose, will consider stopping tomorrow if 4/14 blood cultures remain negative  Obtain a CT abd/pelvis - with contrast if possible - see below  Obtain bilateral duplex US to rule out DVTs  Discussed with overnight medicine team. Transplant ID will continue to follow.     Sindhu Del Rio D.O.   Infectious Diseases  Contact information available via [facility] Paging/Directory       Assessment:  74 y/o female with a history of a liver transplant (2006), recent Enterococcus faecalis bacteremia and K pneumoniae pyelonephritis s/p ertapenem (completed 3/28/25) and vancomycin (completed 3/22/25), recurrent polymicrobial ESBL UTI (dx 4/7/25) on ertapenem who presented on 4/14/25 for generalized weakness.     Generalized weakness  Leukocytosis  Recent ESBL UTI  ? Low grade fever at home  Restarted ertapenem ~ 4/8 after a 4/7 urine culture resulted with ESBL K oxytoca and ESBL Proteus vulgarius. 4/14 UA demonstrates an improved UA profile. Urine culture < 10K normal louann. 4/14 blood cultures are negative. Enteric pathogen panel negative. At risk for C diff so will need to rule this out if she has ongoing diarrhea. Given the pain with palpation in her suprapubic area and left abdomen recommend CT A/P w/ contrast (if possible). Also endorses calf pain so can obtain duplex US. At this point there is not a clear indication for vancomycin but can continue for now and if blood cultures are negative in the next 24 - 48 hours can stop.     Previous ID issues:   Enterococcus faecalis  "bacteremia  3/8 blood culture positive for E faecalis.  TTE was negative for endocarditis. Suspect source is from an episode of multiple loose stools and abdominal pain with transient bacterial translocation. Attempted to transition vancomycin to linezolid however had mouth numbness similar to her adverse reactions to antibiotics that makes her uncomfortable with this plan at home. Completed vancomycin on 3/22/25.      Klebsiella pneumoniae pyelonephritis  3/8 urine culture with Klebsiella pneumoniae and on the liver US 3/8, there is comment of echogenic renal parenchyma and in conjunction of symptoms of intermittent flank pain for a year and pyuria on urine studies for months, favoring treating for an extended course for pyelonephritis. Her antibiotic allergies complicate antibiotic choices here, the only oral option she has tolerated fosfomycin is resistant. Completed course with IV ertapenem due to allergies - completed 3/28/25. At the end of treatment will plan for a surveillance urinary culture 1 week after ertapenem is complete which prompted the UA on 4/7.     Hx coccidioides  Dx in winter [id number], did not tolerate fluconazole due to severe rash. Treated with voriconazole, continued until her return to [place]. CXR with stable calcifications. Recent fungal antibody panel negative though unclear how reliable this is. Voriconazole was stopped 6/2024. Cocci antigen was negative.     Hx EBV viremia s/p rituxan 2016: No lymphadenopathy on recent abdomen/pelvis CT. Not detected 1/20/2025     #Numerous reported antimicrobial allergies  Per allergy note 8/2/2019 \"Prick and intradermal and patch testing to fluconazole, doxycycline, azithromycin, penicillins, and cephalosporins with immediate and delayed readings being negative.\" States she will never take fluconazole again. Would be candidate for oral provocation testing in future with pcn. Also notes an allergy to the  capsules, states tolerates pills or liquid " formulations, sometimes needs them compounded.     Transplant check list:  - Current immunosuppression: sirolimus, prednisone  - QTc interval: 409 ms on 4/14/25  - Bacterial coverage: as above  - Pneumocystis prophylaxis: none  - Serostatus & viral prophylaxis: EBV R+, CMV R-  - Fungal prophylaxis: none, previously voriconazole  - Risk factors to suggest check of Toxoplasma, Strongy, or Schisto serology?: toxoplasma negative 1/20/25  - Immunization status: Up to date on influenza, COVID; due for Tdap  - Gamma globulin status: 1110 on 8/5/2019  - Isolation status: Good Contact for ESBL history  - Code status: Full Code    Medical Decision Making       70 MINUTES SPENT BY ME on the date of service doing chart review, history, exam, documentation & further activities per the note.    This patient visit is eligible for billing by the  code        History of Present Illness   Pertinent history obtain from: chart review and patient    74 y/o female with a history of a liver transplant (2006), recent Enterococcus faecalis bacteremia and K pneumoniae pyelonephritis s/p ertapenem (completed 3/28/25) and vancomycin (completed 3/22/25) who presented on 4/14/25 for generalized weakness.   On 4/7 UA demonstrated > 182 wBC, wbc clums, large leukocyte esterace. 4/7 urine culture grew ESBL K oxytoca and ESBL Proteus vulgaris.     She completed ertapenem but then it was recommended on 4/8 that she resume ertapenem 1 gram IV every 24 hours for 2 weeks. PICC line  was still in place from previous course of IV antibiotics.      Per patient she does not typically get UTI symptoms. + incontinence of urine and stool. Unable to determine if she has had diarrhea. Daughter checked temperature at home which was up to 100.6. Denies sinus pain, sore throat, cough, SOB. She does endorse lower pelvic pain that is new. Also notes bilateral leg pain which is also new. Uses a walker to be mobile. Family helps with her living day to day. No sick  "contacts. Recently had a scc removed from face. No falls. They had tried to titrate her prednisone to 5 mg but this made her feel horrible so they increased the dose to 40 mg and have been doing a taper. Back to 10 mg.     On 4/14 the UA did show improvement in the amount of WBC and LE.    On admission temp 99.8. WBC 14-15. Continued on ertapenem and started vancomycin.   4/14 blood cultures negative to date. Urine culture < 10K urogenital louann.   Last CT image was on 3/8 - showed hypodensity in he inferior liver possibly representing perperal biliary ductal dilation and a 1.4 cm lesion in the pancreatic head region, diverticulosis w/o diverticulitis. CXR w/ mild pulmonary edema.     Physical Exam     Vital signs:  /49 (BP Location: Left arm, Patient Position: Semi-Mckinley's, Cuff Size: Adult Regular)   Pulse 67   Temp 98  F (36.7  C) (Oral)   Resp 18   Ht 1.702 m (5' 7\")   Wt 76.4 kg (168 lb 6.4 oz)   LMP 06/01/1988 (Approximate)   SpO2 100%   BMI 26.38 kg/m      GENERAL: chronically ill appearing, weak, laying in bed  ENT: edentulous, no sores in mouth   NECK: no adenopathy  RESP: lungs clear to auscultation - no rales, rhonchi or wheezes, RA, no accessory muscle use  CV: regular rate and rhythm, normal S1 S2, no murmur, click or rub, trace peripheral edema  ABDOMEN: soft, tender over the left abdomen and suprapubic area with palpation, bowel sounds normal; urinary wick and depends in place  MS and SKIN: no joint swelling, bilateral erythematous skin areas over the lower calves, pain with palpation over these areas  NEURO: Normal strength and tone, mentation intact and speech normal. Alert. Oriented.   Data   Laboratory data and imaging listed below was reviewed prior to this encounter.     Microbiology:    Culture   Date Value Ref Range Status   04/14/2025 No growth after 12 hours  Preliminary   04/14/2025 <10,000 CFU/mL Urogenital louann  Final   04/14/2025 No growth after 1 day  Preliminary "   04/07/2025 50,000-100,000 CFU/mL Klebsiella oxytoca ESBL (A)  Final   04/07/2025 50,000-100,000 CFU/mL Proteus vulgaris ESBL (A)  Final   03/09/2025 No Growth  Final   03/09/2025 No Growth  Final   03/08/2025 No Growth  Final   03/08/2025 >100,000 CFU/mL Klebsiella pneumoniae (A)  Final   03/08/2025 Positive on the 1st day of incubation (A)  Final   03/08/2025 Enterococcus faecalis (AA)  Final     Comment:     2 of 2 bottles   02/07/2025 10,000-50,000 CFU/mL Mixture of Urogenital Louann  Final   01/22/2025 10,000-50,000 CFU/mL Mixture of urogenital louann  Final   01/21/2025 >100,000 CFU/mL Mixture of Urogenital Louann  Final   01/20/2025 No Growth  Final   01/20/2025 No Growth  Final   01/20/2025 No Growth  Final   01/19/2025 <10,000 CFU/mL Mixture of Urogenital Louann  Final   01/19/2025 No Growth  Final   01/19/2025 Positive on the 1st day of incubation (A)  Final   01/19/2025 Staphylococcus epidermidis (AA)  Final     Comment:     1 of 2 bottles  The recovery of this organism from a single blood culture bottle most likely represents contamination. If susceptibility testing is needed, please refer to the antibiogram or contact [facility]. If contamination is suspected, it is important to repeat two sets of blood cultures from two different sites.   01/10/2025 <10,000 CFU/mL Urogenital louann  Final   01/07/2025 No Growth  Final   01/06/2025 No Growth  Final   01/03/2025 No Growth  Final   01/03/2025 No Growth  Final   12/10/2024 No Growth  Final   12/10/2024 No Growth  Final   12/10/2024 >100,000 CFU/mL Klebsiella pneumoniae (A)  Final   11/06/2024 No Growth  Final   10/11/2024 No Growth  Final   10/11/2024 50,000-100,000 CFU/mL Mixture of Urogenital Louann  Final   09/30/2024 No Growth  Final   09/22/2024 >100,000 CFU/mL Klebsiella oxytoca ESBL (A)  Final   09/22/2024 Positive on the 1st day of incubation (A)  Final   09/22/2024 Klebsiella oxytoca ESBL (AA)  Final     Comment:     2 of 2 bottles   09/22/2024 Pseudomonas  aeruginosa (AA)  Final     Comment:     1 of 2 bottles   08/20/2024 >100,000 CFU/mL Klebsiella oxytoca ESBL (A)  Final   , Inflammatory Markers:   Recent Labs   Lab Test 04/15/25  0613 08/26/19  2331 05/20/19  0957 07/30/18  0855 09/03/17  0912 09/02/17  0648 09/01/17  0832   SED 61* 78* 47* 73*  --   --   --    CRP  --  180.0* <2.9 5.2 64.0* 71.0* 51.0*   , Urine Studies:    Recent Labs   Lab Test 04/14/25  1430 04/07/25  0945 04/07/25  0935 03/08/25  1737 02/07/25  0215   LEUKEST Moderate* Large* Large* Large* Small*   WBCU 85* >182* >100* >182* 10-25*   , Hematology Studies:    Recent Labs   Lab Test 04/15/25  0613 04/14/25  1410 04/07/25  0945 04/04/25  1159 03/19/25  1230 03/11/25  0923 01/04/25  0742 01/03/25  1733 10/11/24  2212 09/30/24  1545 08/27/19  0532 08/26/19  2331 05/19/18  0629 05/18/18  0731 09/03/17  0912 09/02/17  0648 09/01/17  0832   WBC 15.1* 14.8* 6.5 9.8 7.2 7.1   < > 9.2   < > 8.2   < > 12.5*   < > 8.0   < > 10.2 9.4   ANEU  --   --   --   --   --   --   --  6.9  --  3.7  --  10.8*  --  5.0  --  7.4 6.5   AEOS  --   --   --   --   --   --   --  0.0  --  0.2  --  0.0  --  0.1  --  0.2 0.1   HGB 9.2* 9.9* 9.1* 10.8* 9.7* 9.8*   < > 10.2*   < > 11.1*   < > 10.5*   < > 11.6*   < > 10.5* 11.1*   MCV 90 88 88 86 88 90   < > 89   < > 93   < > 86   < > 86   < > 89 87    369 351 376 400 305   < > 418   < > 388   < > 226   < > 324   < > 259 269    < > = values in this interval not displayed.    , Metabolic Studies:   Recent Labs   Lab Test 04/15/25  0613 04/14/25  1410 04/07/25  0945 04/04/25  1159 03/19/25  1230    141 139 140 141   POTASSIUM 3.4 3.4 4.0 4.7 4.0   CHLORIDE 107 107 105 105 105   CO2 23 22 21* 18* 23   BUN 26.6* 25.9* 33.1* 36.7* 33.4*   CR 1.69* 1.42* 1.83* 1.79* 1.59*   GFRESTIMATED 31* 38* 28* 29* 34*   , and Hepatic Studies:   Recent Labs   Lab Test 04/15/25  0613 04/14/25  1410 04/07/25  0945 04/04/25  1159 03/10/25  0614 03/09/25  1043 03/08/25  1852   BILITOTAL 0.3  0.3  --  0.4 0.2 0.4 0.6   ALKPHOS 74 84  --  137 97 102 108   ALBUMIN 2.8* 3.3* 3.5 3.6 3.0* 3.2* 3.3*   AST 17 16  --  104* 37 46* 92*   ALT 12 12 40 88* 87* 84* 86*

## 2025-04-15 NOTE — CONSULTS
Care Management Initial Consult    General Information  Assessment completed with: Patient, VM-chart review,    Type of CM/SW Visit: Initial Assessment    Primary Care Provider verified and updated as needed: Yes (Daniel Hidalgo [phone] [facility] [facility] [id number]   Readmission within the last 30 days: no previous admission in last 30 days      Reason for Consult: discharge planning, utilization management concerns (elevated readmission risk score)  Advance Care Planning: Advance Care Planning Reviewed: present on chart  HCA - Daughter Gloria Caro; 1st Alternate HCA - Son Luciano Thompson Sr     Communication Assessment  Patient's communication style: spoken language (English or Bilingual)       Cognitive  Cognitive/Neuro/Behavioral: WDL                      Living Environment:   People in home: alone     Current living Arrangements: independent living facility      Able to return to prior arrangements: yes  Living Arrangement Comments: Independent living apartment at [facility]    Family/Social Support:  Care provided by: self  Provides care for: no one  Marital Status:   Support system: Children, Other (specify) (grandchildren)          Description of Support System: Supportive, Involved    Support Assessment: Adequate family and caregiver support, Patient communicates needs well met    Current Resources:   Patient receiving home care services: Yes  Skilled Home Care Services: Physical Therapy; Occupational Therapy- (Provided by [facility] (Ph: [phone]; Fax: [phone])     Community Resources: DME, Home Care, Housekeeping/Chore Agency  Equipment currently used at home: grab bar, toilet, grab bar, tub/shower, walker, rolling, hospital bed  Supplies currently used at home: Other, Nutritional Supplements, Diabetic Supplies, Incontinence Supplies (Protein shakes; Neil II for DM2)    Employment/Financial:  Employment Status: retired        Financial  Concerns: none   Referral to Financial Worker: No     Does the patient's insurance plan have a 3 day qualifying hospital stay waiver?  No    Lifestyle & Psychosocial Needs:  Social Drivers of Health     Food Insecurity: Low Risk  (3/12/2025)    Food Insecurity     Within the past 12 months, did you worry that your food would run out before you got money to buy more?: No     Within the past 12 months, did the food you bought just not last and you didn t have money to get more?: No   Depression: Not at risk (4/2/2025)    PHQ-2     PHQ-2 Score: 0   Housing Stability: Low Risk  (3/12/2025)    Housing Stability     Do you have housing? : Yes     Are you worried about losing your housing?: No   Recent Concern: Housing Stability - High Risk (1/22/2025)    Housing Stability     Do you have housing? : No     Are you worried about losing your housing?: No   Tobacco Use: Medium Risk (4/8/2025)    Patient History     Smoking Tobacco Use: Former     Smokeless Tobacco Use: Never     Passive Exposure: Not on file   Financial Resource Strain: Low Risk  (3/12/2025)    Financial Resource Strain     Within the past 12 months, have you or your family members you live with been unable to get utilities (heat, electricity) when it was really needed?: No   Alcohol Use: Not At Risk (11/9/2023)    Received from [facility]    AUDIT-C     Frequency of Alcohol Consumption: Never     Average Number of Drinks: Patient does not drink     Frequency of Binge Drinking: Never   Transportation Needs: Low Risk  (3/12/2025)    Transportation Needs     Within the past 12 months, has lack of transportation kept you from medical appointments, getting your medicines, non-medical meetings or appointments, work, or from getting things that you need?: No   Recent Concern: Transportation Needs - High Risk (1/27/2025)    Transportation Needs     Within the past 12 months, has lack of transportation kept you from medical appointments, getting your medicines,  "non-medical meetings or appointments, work, or from getting things that you need?: Yes   Physical Activity: Insufficiently Active (11/9/2023)    Received from [facility]    Exercise Vital Sign     Days of Exercise per Week: 5 days     Minutes of Exercise per Session: 10 min   Interpersonal Safety: Low Risk  (3/12/2025)    Interpersonal Safety     Do you feel physically and emotionally safe where you currently live?: Yes     Within the past 12 months, have you been hit, slapped, kicked or otherwise physically hurt by someone?: No     Within the past 12 months, have you been humiliated or emotionally abused in other ways by your partner or ex-partner?: No   Stress: No Stress Concern Present (11/9/2023)    Received from [facility]    [employer] - Occupational Stress Questionnaire     Feeling of Stress : Only a little   Social Connections: Feeling Socially Integrated (12/20/2023)    Received from [facility]    OASIS : Social Isolation     Frequency of experiencing loneliness or isolation: Never   Health Literacy: Not on file     Functional Status:  Prior to admission patient needed assistance:   Dependent ADLs:: Ambulation-cane, Ambulation-walker, Incontinence  Dependent IADLs:: Cleaning, Cooking, Laundry (Cleaning services and two meals a day provided by facility)     Mental Health Status:  Mental Health Status: No Current Concerns       Chemical Dependency Status:  Chemical Dependency Status: No Current Concerns         Values/Beliefs:  Spiritual, Cultural Beliefs, Anabaptist Practices, Values that affect care: no          Values/Beliefs Comment: Miky    Discussed  Partnership in Safe Discharge Planning  document with patient/family: No    Additional Information:    Per chart review: Luz Thompson is a \"76 yo w/ PMHx of primary biliary cirrhosis s/p liver transplant in 2002, paroxysmal atrial fibrillation on apixaban, HTN, T2DM, CKD, HF, hx of CVA, hypothyroidism, recurrent " "ESBL UTIs with recent E. [facility]ec[facility] bacteremia admission last month on IV ertapenem via PICC admitted for generalized weakness likely 2/2 infection \" (Surekha Moncada MD; 4/14/2025)    Care Management/Social Work Consult placed due to patient's complex medical history and elevated unplanned readmission risk score and for discharge planning. NADEEM performed chart review to begin assessment.     1725 NADEEM met with Virginia at bedside to complete assessment and confirm/update information in previous assessment (3/9/2025).  NADEEM introduced self and role, and explained the reason for the visit. Virginia agreed to speak with NADEEM    Virginia confirmed that she lives in an independent living apartment at [facility] in [place], and that she receives PT/OT through [facility] (Ph: [phone]; Fax: [phone]). She uses a cane or walker for ambulation but is otherwise independent with her ADLs. The facility provides housekeeping services and 2 meals/day, but she is otherwise independent with her I/ADLs. She also keeps a lightweight walker in her car for when she is in the community.    Virginia confirmed that she has an HCD on file but reported that she has an appointment with her  to complete a new one.  offered to provide  email address in order for her to email an electronic copy of the document. Virginia accepted the offer and confirmed her profile email ws current and correct.    Virginia confirmed that  one of her children will provide discharge transportation. No additional questions or concerns were reported. NADEEM provided availability and contact information and excused self from Virginia's bedside.    1931 NADEEM provided SW work email via email to Virginia (email: [email or web address])    Next Steps:    EMILY for PT/OT to [facility]   IHO done  Discharge IMM after 4/16    SW will continue to follow as needed.    Aliya Merritt, HUMA, LGSW  ED/OBS   [facility]  Phone: " [phone]  Pager: [phone]  Fax: [phone]     After hours [facility] and After Hours [facility] [place]     On-call pager, [phone], 4:00 pm to 8:30 pm

## 2025-04-15 NOTE — CONSULTS
Hepatology Consultation    Luz Thompson   MRN# [id number]     Age: 75 year old YOB: 1950       Referring provider: Thomas Luz  Attending Hepatologist: Dr. Leventhal    Consult requested for: post transplant       Assessment and Recommendation:   Assessment:   Ms. 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 Faecalis bactereimai (3/2025) and recurrent UTI's who was admitted with generalized weakness.       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 Blood and urine cultures ngtd.   - has received ertapenem and vancomycin while inpatient  - has been on sirolimus 1 mg daily, prednisone 10 mg daily for immunosuppression. Will have sirolimus trough level in am.   - continue prednisone 10 mg daily, will discuss with primary hepatologist potential for decreasing dose- will need prolonged taper.     Plan of care discussed with Dr. Leventhal. The above note reflects our joint plan.     Thank you for the opportunity to be involved in Luz Thompson care. Please call with any questions or concerns.     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 Faecalis bactereimai (3/2025) and recurrent UTI's who was admitted with generalized weakness. Infectious work up so far with negative blood and urine cultures despite recent + urine culture on 4/7 with Klebsiella.     She denies current fevers, abdominal pain, nausea or vomiting. She is [place] and did not have her hearing aides in place but verbalized understanding with increased voice and tone. She has been taking sirolimus daily and prednisone  "10 mg daily. She reports that she had tried to decrease her prednisone in the past, this caused joint aches and feeling unwell.       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      Past Surgical History:   Procedure Laterality Date    APPENDECTOMY  1961    CATARACT IOL, RT/LT      RE12/19/2013, LE12/10/2013 - Toric lenses    CHOLECYSTECTOMY  1991    COLONOSCOPY  03/10/2014    Procedure: COLONOSCOPY;;  Surgeon: Gentry Ramirez MD;  Location:  GI    CYSTOSCOPY      ear drum repair      ENDOBRONCHIAL ULTRASOUND FLEXIBLE N/A 09/29/2017    Procedure: ENDOBRONCHIAL ULTRASOUND FLEXIBLE;  " Flexible Bronchoscopy, Endobronchial Ultrasound, Transbronchial Needle Aspiration ;  Surgeon: Eden Clinton MD;  Location: [facility] OR    ENDOSCOPIC RETROGRADE CHOLANGIOPANCREATOGRAM  2013    Procedure: ENDOSCOPIC RETROGRADE CHOLANGIOPANCREATOGRAM;  Endoscopic Retrograde Cholangiopancreatogram with single balloon enteroscopy, ballon sweep of bile duct;  Surgeon: Brett Membreno MD;  Location: [facility] OR    HC KNEE SCOPE,MED/LAT MENISECTOMY Right 08/10/2012    partial medial menisectomy only    KNEE SURGERY  1966    R knee    PICC INSERTION  2013    4fr SL PASV PICC, 40cm (1cm external) in the R basilic vein w/ tip in the low SVC    PICC INSERTION  2014    5 fr DL BioFlo Navilyst PICC, 46 cm (3 cm external) in the L basilic vein w/ tip in the SVC RA junction.    THYROIDECTOMY  2010    TRANSPLANT LIVER RECIPIENT LIVING UNRELATED  2002             Family hx Social hx   Family History   Problem Relation Age of Onset    Hypertension Mother     Endometrial Cancer Mother     Hyperlipidemia Mother     Prostate Cancer Father     Macular Degeneration Father     Cancer - colorectal Maternal Grandmother         in her 80's, has surgery and removal of part of kidney,  at age 98    Heart Disease Maternal Grandfather          at 98    Glaucoma Maternal Grandfather     Cerebrovascular Disease Paternal Grandmother         in her 80's    Hypertension Paternal Grandmother     Heart Disease Paternal Grandfather         MI    Alzheimer Disease Paternal Grandfather     Allergies Son     Neurologic Disorder Daughter         Migraines    Breast Cancer Other     Anesthesia Reaction No family hx of     Crohn's Disease No family hx of     Ulcerative Colitis No family hx of     Melanoma No family hx of     Skin Cancer No family hx of       Social History     Tobacco Use    Smoking status: Former     Current packs/day: 0.00     Average packs/day: 1 pack/day for 18.0 years (18.0 ttl pk-yrs)     Types: Cigarettes      "Start date: 1967     Quit date: 1985     Years since quittin.0    Smokeless tobacco: Never   Vaping Use    Vaping status: Never Used   Substance Use Topics    Alcohol use: Yes     Alcohol/week: 0.0 standard drinks of alcohol     Comment: rare - \"I toast at weddings\"    Drug use: No                  Immunizations:     Immunization History   Administered Date(s) Administered    COVID-19 12+ (Pfizer) 10/17/2024    COVID-19 MONOVALENT 12+ (Pfizer) 2021, 03/15/2021, 2021, 10/09/2021    Flu 65+ (Fluad) 2019    P1s5-33 Novel Flu P-free 11/10/2009    HEPA 2001    HepB, Unspecified 2001    Influenza (High Dose) Trivalent,PF (Fluzone) 2015, 10/30/2016, 10/23/2017, 10/24/2024    Influenza (IIV3) PF 11/10/2004, 2007, 10/01/2010, 2012, 10/29/2012, 2013, 2016    Influenza Vaccine 18-64 (Flublok) 10/20/2018    Influenza Vaccine 65+ (Fluzone HD) 2020, 2022, 10/20/2022    Influenza Vaccine, 6+MO IM (QUADRIVALENT W/PRESERVATIVES) 10/01/2020    Pneumo Conj 13-V (2010&after) 2014, 2016    Pneumococcal 23 valent 2007, 2014, 2016    TD,PF 7+ (Tenivac) 2007    TDAP (Adacel,Boostrix) 2014    TDAP Vaccine (Adacel) 2007, 2014            Allergies:     Allergies   Allergen Reactions    Fluconazole Hives and Itching     Full body hives  **Intradermal skin testing negative**  [See intradermal skin testing results from 2019]    Mycophenolate Diarrhea and Nausea and Vomiting     Patient stated it was chronic and lasted months      Penicillins Anaphylaxis, Hives, Itching and Rash     **Intradermal skin testing negative**  [See intradermal skin testing results from 2019]      Simvastatin Muscle Pain (Myalgia)     severe  Other reaction(s): Myalgia caused by statin    Methotrexate Other (See Comments)     Other reaction(s): Sore  Sores in mouth, esophagus, and stomach.       Morphine And Codeine Itching " and Other (See Comments)     Psych disturbance  Other reaction(s): Confusion, Mood alteration    Quinolones Anxiety, Dizziness, Headache, Other (See Comments), Palpitations and Unknown     Other reaction(s): Hyperactive behavior, Lightheadedness, Mood alteration    Dizzy, light headed    Dizziness, shaky, and jumpy    Benadryl [Diphenhydramine Hcl]      Insomnia     Capsules, Empty Gelatin [Gelatin]     Lansoprazole Diarrhea    Azithromycin Itching     [See intradermal skin testing results from 8/2/2019]    Bactrim [Sulfamethoxazole-Trimethoprim] Other (See Comments)     Numb mouth, tingling lips (treated with anti-histamines)    Cephalosporins Itching     [See intradermal skin testing results from 8/2/2019]    Ciprofloxacin Hcl Other (See Comments) and Dizziness     Insomnia, mood lability, Irregular heart beat         Doxycycline Itching and Unknown     [See intradermal skin testing results from 8/2/2019]    Lisinopril Cough    Omeprazole Itching    Tolectin [Nsaids] Rash    Tolmetin Rash and Itching    Tramadol Rash, Hives and Itching             Medications:   @(Not in a hospital admission) @          Review of Systems:    ROS: 10 point ROS neg other than the symptoms noted above in the HPI.          Physical Exam:   Blood pressure 114/42, pulse 69, temperature 98.1  F (36.7  C), temperature source Oral, resp. rate 18, last menstrual period 06/01/1988, SpO2 98%, not currently breastfeeding. There is no height or weight on file to calculate BMI.    General: In no acute distress, no facial muscle wasting  Neuro: AOx3, No asterixis  HEENT: PERRL Noscleral icterus,   CV: Skin warm and dry  Lungs: Respirations even and nonlabored on room air  Abd: nondistended nontender  Skin: Nojaundice  Psych: pleasant    Wt Readings from Last 10 Encounters:   02/12/25 77.1 kg (170 lb)   02/06/25 86.2 kg (190 lb)   01/19/25 86.2 kg (190 lb)   01/10/25 86.5 kg (190 lb 11.2 oz)   01/04/25 83 kg (183 lb)   01/02/25 79.4 kg (175 lb)  "  12/10/24 79.4 kg (175 lb)   12/02/24 80.7 kg (178 lb)   10/17/24 81.2 kg (179 lb)   10/13/24 86 kg (189 lb 9.5 oz)             Data:     Lab Results   Component Value Date    WBC 15.1 04/15/2025    WBC 7.1 09/18/2020     Lab Results   Component Value Date    RBC 3.37 04/15/2025    RBC 3.80 09/18/2020     Lab Results   Component Value Date    HGB 9.2 04/15/2025    HGB 11.2 09/18/2020     Lab Results   Component Value Date    HCT 30.3 04/15/2025    HCT 35.0 09/18/2020     No components found for: \"MCT\"  Lab Results   Component Value Date    MCV 90 04/15/2025    MCV 92 09/18/2020     Lab Results   Component Value Date    MCH 27.3 04/15/2025    MCH 29.5 09/18/2020     Lab Results   Component Value Date    MCHC 30.4 04/15/2025    MCHC 32.0 09/18/2020     Lab Results   Component Value Date    RDW 18.7 04/15/2025    RDW 17.2 09/18/2020     Lab Results   Component Value Date     04/15/2025     09/18/2020       Last Basic Metabolic Panel:  Lab Results   Component Value Date     04/15/2025     09/18/2020      Lab Results   Component Value Date    POTASSIUM 3.4 04/15/2025    POTASSIUM 4.0 06/24/2022    POTASSIUM 4.4 09/18/2020     Lab Results   Component Value Date    CHLORIDE 107 04/15/2025    CHLORIDE 98 12/05/2022    CHLORIDE 105 09/18/2020     Lab Results   Component Value Date    KEILY 8.4 04/15/2025    KEILY 8.8 09/18/2020     Lab Results   Component Value Date    CO2 23 04/15/2025    CO2 28 06/24/2022    CO2 29 09/18/2020     Lab Results   Component Value Date    BUN 26.6 04/15/2025    BUN 44 06/24/2022    BUN 19 09/18/2020     Lab Results   Component Value Date    CR 1.69 04/15/2025    CR 1.3 09/18/2020     Lab Results   Component Value Date     04/15/2025     04/15/2025     06/24/2022     09/18/2020       Liver Function Studies -   Recent Labs   Lab Test 04/15/25  0613   PROTTOTAL 5.3*   ALBUMIN 2.8*   BILITOTAL 0.3   ALKPHOS 74   AST 17   ALT 12       Lab Results " "  Component Value Date    INR 1.23 01/03/2025           Previous work-up:   Lab Results   Component Value Date    HEPBANG Nonreactive 05/13/2016    HBCAB Nonreactive 05/13/2016    AUSAB 0.09 05/13/2016    HCVAB  05/13/2016     Nonreactive   Assay performance characteristics have not been established for newborns,   infants, and children      HCVRNA <25 12/23/2008    LAURA 44 04/07/2025    IRONSAT 13 (L) 04/07/2025     08/05/2019    TSH 1.72 04/04/2025    CHOL 291 (H) 09/04/2024    HDL 72 09/04/2024     (H) 09/04/2024    TRIG 329 (H) 09/04/2024    A1C 6.8 (H) 04/14/2025      No results found for: \"SPECDES\", \"LDRESULTS\"          Endoscopy:     None recent      IMAGING:  Results for orders placed during the hospital encounter of 08/26/19    US Liver Transplant    Narrative  EXAMINATION: US LIVER TRANSPLANT, 8/27/2019 8:45 AM    COMPARISON: CT abdomen/pelvis 8/27/2019. Transplant ultrasound  5/13/2016    HISTORY: History of transplant liver in 2011. Now with urinary tract  infection and elevated LFTs.    TECHNIQUE:  Gray-scale, color Doppler and spectral flow analysis.    FINDINGS:  There is no ascites.    Liver:   The liver demonstrates normal homogeneous echotexture. No  evidence of a focal hepatic mass.    Bile Ducts: No intrahepatic biliary dilatation. The common bile duct  is not well-visualized.    Gallbladder: The gallbladder is surgically absent.    Kidneys:  Right kidney:  The right kidney demonstrates increased parenchymal  echogenicity with cortical thinning. No focal mass or hydronephrosis.  10.7 cm in long axis dimension.  Left kidney:  The left kidney demonstrates increased parenchymal  echogenicity with cortical thinning. No focal mass or hydronephrosis.  9.6 cm in long axis dimension.    Pancreas: This was portions of the head and body the pancreas are  normal.    Spleen:  The spleen is normal in size, measuring 9.3 cm.    Visualized portions of the aorta are unremarkable.    LIVER " DOPPLER:  Splenic vein:  Patent continuous normal antegrade direction flow  towards the liver, 21 cm/sec.  Extrahepatic portal vein:  Patent continuous antegrade flow, 13  cm/sec.  Portal vein at anastomosis: Patent continuous antegrade flow, 11  cm/sec.  Intrahepatic portal vein:  Patent continuous antegrade flow, 15  cm/sec.  Right portal vein flow is antegrade, measuring 31 cm/sec.  Left portal vein flow is antegrade, measuring 11 cm/sec.    Inferior vena cava: patent with flow toward the heart throughout..  IVC above anastomosis:  52 cm/sec.  IVC at anastomosis:  57 cm/sec.  Intrahepatic IVC:  23 cm/sec.  Extrahepatic IVC:  20 cm/sec.    Right, mid, left hepatic veins: Patent with flow towards the inferior  vena cava.    Extrahepatic hepatic artery: Low resistance waveform with flow towards  the liver. 104 cm/sec with resistive index 0.75.  Right hepatic artery: 111 cm/sec with resistive index 0.70.  Left hepatic artery: 43 cm/sec with resistive index 0.70.    Impression  Impression:  1. Normal grayscale and Doppler evaluation of the transplant liver  although the common bile was not visualized.  2. Echogenic kidneys compatible with history of medical renal disease.    I have personally reviewed the examination and initial interpretation  and I agree with the findings.    ZACHARIAH AHN, DO    Results for orders placed during the hospital encounter of 09/22/24    XR Chest Port 1 View    Narrative  EXAM: XR CHEST PORT 1 VIEW  LOCATION: [facility]  DATE: 9/22/2024    INDICATION: Central line placement  COMPARISON: 9/22/2024    Impression  IMPRESSION: Left IJ approach central venous catheter tip at the upper SVC. No pneumothorax. Lungs clear. No pleural effusion. Stable cardiomediastinal silhouette. Left chest implanted electronic device.    No results found for this or any previous visit.    No results found for this or any previous visit.    XR Chest 2 Views    Result Date: 4/14/2025  EXAM:  XR CHEST 2 VIEWS 4/14/2025 4:50 PM INDICATION: Infectious work up COMPARISON: Chest radiograph 3/8/2025 FINDINGS: Metallic apparatus projecting over the aortic knob. Additional tubular device in stable position projecting over the mid lung. Similar patchy perihilar and increased left basilar hazy opacities. Trachea is midline. Cardiac silhouette is stable. Upper abdomen unremarkable.     IMPRESSION: Similar mild pulmonary edema and basilar atelectasis. I have personally reviewed the examination and initial interpretation and I agree with the findings. LOIVIA CHAPMAN DO   SYSTEM ID:  [id number]

## 2025-04-15 NOTE — PHARMACY-VANCOMYCIN DOSING SERVICE
Pharmacy Vancomycin Note  Date of Service April 15, 2025  Patient's  1950   75 year old, female    Indication: Bacteremia  Day of Therapy: 2  Current vancomycin regimen: Intermittent vancomycin dosing based on levels   Current vancomycin monitoring method: Trough monitoring  Current vancomycin therapeutic monitoring goal: 15-20 mg/L      Current estimated CrCl = Estimated Creatinine Clearance: 30.2 mL/min (A) (based on SCr of 1.69 mg/dL (H)).    Creatinine for last 3 days  2025:  2:10 PM Creatinine 1.42 mg/dL  4/15/2025:  6:13 AM Creatinine 1.69 mg/dL    Recent Vancomycin Levels (past 3 days)  4/15/2025: 10:07 AM Vancomycin 13.1 ug/mL    Vancomycin IV Administrations (past 72 hours)                     vancomycin (VANCOCIN) 1,250 mg in 0.9% NaCl 250 mL intermittent infusion (mg) 1,250 mg New Bag 25 1826                    Nephrotoxins and other renal medications (From now, onward)      Start     Dose/Rate Route Frequency Ordered Stop    04/15/25 0934  vancomycin place horvath - receiving intermittent dosing         1 each Intravenous SEE ADMIN INSTRUCTIONS 04/15/25 0934                 Contrast Orders - past 72 hours (72h ago, onward)      None            Interpretation of levels and current regimen:  Vancomycin level is reflective of subtherapeutic level    Has serum creatinine changed greater than 50% in last 72 hours: No    Urine output:  unable to determine    Renal Function: Worsening?     InsightRX Prediction of Planned New Vancomycin Regimen    Loading dose: N/A  Regimen: 750 mg IV every 24 hours.  Start time: 18:26 on 04/15/2025  Exposure target: AUC24 (range)400-600 mg/L.hr   AUC24,ss: 491 mg/L.hr  Probability of AUC24 > 400: 86 %  Ctrough,ss: 15.6 mg/L  Probability of Ctrough,ss > 20: 16 %  Probability of nephrotoxicity (Lodise VERA ): 11 %      Plan:  Change to a schedule- 750mg IV vancomycin Q 24H  Vancomycin monitoring method: AUC  Vancomycin therapeutic monitoring goal: 400-600  mg*h/L  Pharmacy will check vancomycin levels as appropriate in 1-3 Days.  Serum creatinine levels will be ordered daily for the first week of therapy and at least twice weekly for subsequent weeks.    Daquan Hodge PharmD, [facility]    [phone]  Zachariah       Normal for race

## 2025-04-15 NOTE — TELEPHONE ENCOUNTER
OPAT patient, currently in ER.    Linda Tilley, BSN, RN  RN Care Coordinator [facility]

## 2025-04-15 NOTE — PROGRESS NOTES
ED PT Evaluation:      04/15/25 1242   Appointment Info   Signing Clinician's Name / Credentials (PT) Jose Antonio Mcwilliams, PT, DPT   Living Environment   People in Home alone   Current Living Arrangements independent living facility   Home Accessibility no concerns   Transportation Anticipated family or friend will provide   Self-Care   Equipment Currently Used at Home grab bar, toilet;grab bar, tub/shower;walker, rolling;hospital bed   Fall history within last six months no   Activity/Exercise/Self-Care Comment Mod I with 4WW at baseline. Walks to dining area daily for meals. Also has SPC but prefers not to use. Has cleaning service. Sees HH PT 1-2x/wk.   General Information   Onset of Illness/Injury or Date of Surgery 04/14/25   Referring Physician Dianne Pederson MD   Patient/Family Therapy Goals Statement (PT) Return home   Pertinent History of Current Problem (include personal factors and/or comorbidities that impact the POC) Per EMR: 76 yo w/ PMHx of primary biliary cirrhosis s/p liver transplant in 2002, paroxysmal atrial fibrillation on apixaban, HTN, T2DM, CKD, HF, hx of CVA, hypothyroidism, recurrent ESBL UTIs with recent E. Faecalis bacteremia admission last month on IV ertapenem via PICC admitted for generalized weakness likely 2/2 infection   Existing Precautions/Restrictions fall   General Observations pt supine, confused but agreeable to session.   Cognition   Affect/Mental Status (Cognition) confused;emotionally labile   Posture    Posture Forward head position;Protracted shoulders   Range of Motion (ROM)   Range of Motion ROM is WFL   Strength (Manual Muscle Testing)   Strength (Manual Muscle Testing) Deficits observed during functional mobility   Strength Comments Grossly deconditioned   Bed Mobility   Bed Mobility supine-sit   Supine-Sit [place] (Bed Mobility) supervision;verbal cues   Transfers   Transfers sit-stand transfer   Sit-Stand Transfer   Sit-Stand [place] (Transfers) minimum  assist (75% patient effort)   Comment, (Sit-Stand Transfer) HHA   Gait/Stairs (Locomotion)   [place] (Gait) contact guard   Assistive Device (Gait) walker, front-wheeled   Balance   Balance Comments Requires BUE support when standing   Clinical Impression   Criteria for Skilled Therapeutic Intervention Yes, treatment indicated   PT Diagnosis (PT) Impaired functional mobility   Influenced by the following impairments generalized weakness/deconditioning, acute confusion   Functional limitations due to impairments bed mobility, transfers, gait, balance, activity tolerance, self cares   Clinical Presentation (PT Evaluation Complexity) stable   Clinical Presentation Rationale Clinical judgment   Clinical Decision Making (Complexity) low complexity   Planned Therapy Interventions (PT) balance training;bed mobility training;gait training;home exercise program;patient/family education;strengthening;transfer training;progressive activity/exercise;risk factor education;home program guidelines   Risk & Benefits of therapy have been explained evaluation/treatment results reviewed;care plan/treatment goals reviewed;risks/benefits reviewed;current/potential barriers reviewed;participants voiced agreement with care plan;participants included;patient   PT Total Evaluation Time   PT Eval, Low Complexity Minutes (09099) 6   Physical Therapy Goals   PT Frequency 5x/week   PT Predicted Duration/Target Date for Goal Attainment 04/29/25   PT Goals Bed Mobility;Transfers;Gait   PT: Bed Mobility Modified independent;Supine to/from sit   PT: Transfers Modified independent;Sit to/from stand;Bed to/from chair;Assistive device   PT: Gait Modified independent;150 feet;Assistive device;Rolling walker   PT Discharge Planning   PT Plan sit<>stands, gait with 4WW   PT Discharge Recommendation (DC Rec) home with assist;home with home care physical therapy   PT Rationale for DC Rec Patient's mobility currently limited by generalized  weakness/deconditioning and confusion. Is well known this provider. Typically progresses well to enable discharge home with resumption of HH PT. Will continue to follow and update rec's as appropriate.   PT Brief overview of current status Ax1 with FWW; recommend close pivot transfers to BS; bed alarm when unattended   PT Total Distance Amb During Session (feet) 0   Physical Therapy Time and Intention   Total Session Time (sum of timed and untimed services) 6

## 2025-04-15 NOTE — PROGRESS NOTES
Neuro: A&Ox4, able to make needs known, calls appropriately.   Cardiac: SR. VSS, denies chest pain  Respiratory: Sating well on 2L NC, SOB denied   GI/: Adequate urine output. BM X2, incontinent of B&B, purewick utilized with brief. A1 with walker to C  Diet/appetite: Tolerating regular diet. Eating well.  Activity:  Assist of 1 with walker, up to chair and in halls.  Pain: At acceptable level on current regimen, denies pain.   Skin: No new deficits noted, redness noticed on sacral, preventative mepilex applied.   LDA's: R midline, SL     Plan: Continue with POC. Notify primary team with changes.

## 2025-04-15 NOTE — PROGRESS NOTES
Clinic Care Coordination Contact    Situation: Patient chart reviewed by care coordinator.    Background: Patient is enrolled in Care Coordination and followed by RN CC who has been monitoring patient throughout enrollment for goal progression.     Assessment: Patient is admitted to [facility]  for generalized weakness.     Plan/Recommendations:     Alpa Cho RN, BSN, CPHN, CCM  [facility] Ambulatory Care Management  [facility], and [facility]  [email or web address]  Office: [phone]  Employed by [facility]

## 2025-04-15 NOTE — H&P
[facility]    History and Physical - Medicine Service, JOVANNY TEAM        Date of Admission:  4/14/2025    Assessment & Plan      74 yo w/ PMHx of primary biliary cirrhosis s/p liver transplant in 2002, paroxysmal atrial fibrillation on apixaban, HTN, T2DM, CKD, HF, hx of CVA, hypothyroidism, recurrent ESBL UTIs with recent E. Faecalis bacteremia admission last month on IV ertapenem via PICC admitted for generalized weakness likely 2/2 infection    #generalized weakness likely 2/2 bacteremia  #recurrent UTIs on IV ertapenem since 3/9/2024  #recent E. Faecalis bacteremia  Afebrile and hemodynamically stable with WBC 14.8 on admit though with reported fevers and lethargy. Given UA improved from prior with some leukocyte esterases, consideration for bacteremia 2/2 PICC line over recurrent UTI/pyelonephritis. Reports suprapubic tenderness. CXR w/ mild pulmonary edema. Recent admission in March 2025 for Klebsiella pneumonia UTI and E. Faecalis bacteremia with recommendations from ID for ertapenem 1g q24h until EOT 4/22. RVP negative.  - Abx: initiated vancomycin, continued ertapenem  - PICC line blood cultures obtained, pending  - Urine cultures pending, bladder scan not yet obtained  - Transplant ID consulted    #Liver transplant in 2002 2/2 primary biliary cirrhosis  Follows Dr. Ramirez. Has missed 2 days of medication.  - PTA sirolimus 1 mg qD  - PTA prednisone 10 mg qD  - PTA ursodiol 250 mg BID  - GI/Liver transplant consulted    #CKD3b w/ moderate proteinuria, at baseline  #HTN  #?heart failure  Baseline Cr. 1.5. History of dialysis at time of liver transplant 23 years ago. Recurrent AKIs and immunosuppressive medication toxicity. Not on SGLT2i 2/2 recurrent UTI. Chart review w/ unclear heart failure history, unconfirmed with EF 60-65% on 3/10/25. Encourage oral intake for now. BNP 1446 elevated from 1 month ago. Given unclear HF history, trace bilateral LE edema, with  some pulm edema noted on imaging, consideration for volume overload. However, without orthopnea or clear cough. Will defer diuresis.  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 Wound does not look infected.  - CTM    #anemia, chronic  Hgb 9.9 on admit. Stable.  - HOLD PTA ferrous sulfate    #hypothyroidism  - PTA levothyroxine 175mcg qD          Diet: Regular Diet Adult  DVT Prophylaxis: PTA apixaban 5 mg BID  Ron Catheter: Not present  Fluids: None  Lines: None     Cardiac Monitoring: None  Code Status:  Full Code    Clinically Significant Risk Factors Present on Admission               # Hypoalbuminemia: Lowest albumin = 3.3 g/dL at 4/14/2025  2:10 PM, will monitor as appropriate    # Drug Induced Coagulation Defect: home medication list includes an anticoagulant medication    # Hypertension: Noted on problem list      # Anemia: based on hgb <11      # DMII: A1C = N/A within past 6 months          # Financial/Environmental Concerns:           Disposition Plan      Expected Discharge Date: 04/16/2025                The patient's care was discussed with the Attending Physician, Dr. Jamison .      Surekha Moncada MD  Med-Derm PGY-1  Medicine Service, [facility]  Securely message with [employer] (more info)  Text page via [facility] Paging/Directory   See signed in provider for up to date coverage information  ______________________________________________________________________    Chief Complaint  "  weakness    History is obtained from the patient    History of Present Illness   76 yo w/ PMHx of primary biliary cirrhosis s/p liver transplant in 2002, paroxysmal atrial fibrillation on apixaban, HTN, T2DM, CKD, hx of CVA, hypothyroidism, recurrent ESBL UTIs currently on IV ertapenem PTA via PICC presents with generalized weakness.    Has been feeling fatigued, \"so tired\" since MOHs procedure 4/8. Started feeling acutely worse 2 days ago, subjective fevers, decreased appetite and increased weakness with some loose stools, felt weak when attempting to ambulate to bathroom overnight, denies falls. Was unable to extricate herself from bed this morning and called her daughter. She reports she hasn't eaten d/t fatigue since day before yesterday or taken her meds since then either.    She reports a mild cough, suprapubic pain, and some  loose stools. Denies chest pain, abdominal pain, nausea, vomiting, changes in urination or stooling. She wants the PICC line out and is tired of being in the hospital.       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       Past Surgical History   Past Surgical History:   Procedure Laterality Date    APPENDECTOMY  1961    CATARACT IOL, RT/LT      RE12/19/2013, LE12/10/2013 - Toric lenses    CHOLECYSTECTOMY  1991    COLONOSCOPY  03/10/2014    Procedure: COLONOSCOPY;;  Surgeon: Gentry Ramirez MD;  Location: [facility] GI    CYSTOSCOPY      ear drum repair      ENDOBRONCHIAL ULTRASOUND FLEXIBLE N/A 09/29/2017    Procedure: ENDOBRONCHIAL ULTRASOUND FLEXIBLE;  Flexible Bronchoscopy, Endobronchial Ultrasound, Transbronchial Needle Aspiration ;  Surgeon: Eden Clinton MD;  Location: [facility] OR    ENDOSCOPIC RETROGRADE CHOLANGIOPANCREATOGRAM  09/19/2013    Procedure: ENDOSCOPIC RETROGRADE CHOLANGIOPANCREATOGRAM;  Endoscopic Retrograde Cholangiopancreatogram with single balloon enteroscopy, ballon sweep of bile duct;  Surgeon: Brett Membreno MD;  Location: [facility] OR    HC KNEE SCOPE,MED/LAT MENISECTOMY Right 08/10/2012    partial medial menisectomy only    KNEE SURGERY  1966    R knee    PICC INSERTION  09/18/2013    4fr SL PASV PICC, 40cm (1cm external) in the R basilic vein w/ tip in the low SVC    PICC INSERTION  02/21/2014    5 fr DL BioFlo Navilyst PICC, 46 cm (3 cm external) in the L basilic vein w/ tip in the SVC RA junction.    THYROIDECTOMY  03/2010    TRANSPLANT LIVER RECIPIENT LIVING UNRELATED  05/2002       Prior to Admission Medications   Prior to Admission Medications   Prescriptions Last Dose Informant Patient Reported? Taking?   Continuous Glucose Sensor (FREESTYLE NINO 2 SENSOR) [facility] [place]   Self, Other No No   Sig: Change every 14 days.   D-MANNOSE PO  Self, Other Yes No   Sig: Take 1 Scoop by mouth 2 times daily.   EPINEPHrine (ANY BX GENERIC EQUIV) 0.3 MG/0.3ML injection 2-pack  Self, Other No No   Sig: Inject 0.3 mLs (0.3 mg) into the muscle as needed for anaphylaxis. May repeat one time in 5-15 minutes if response to initial dose is inadequate.   Ferrous Sulfate 324 MG TBEC  Self, Other Yes No   Sig: Take 1 tablet by mouth 2 times daily as needed.   Multiple Vitamins-Minerals (PRESERVISION AREDS 2) CAPS  Self, Other No No   Sig: Take 1 capsule by mouth 2 times daily   Nutrisource Fiber PO packet  Self, Other Yes No   Sig: Take 1 packet by mouth daily.   acetaminophen (TYLENOL) 325 MG tablet   No No   Sig: Take 2 tablets (650 mg) by mouth every 4 hours as needed for mild pain or other (and adjunct with moderate or severe pain or per patient request).   amLODIPine (NORVASC) 2.5 MG tablet   No No   Sig: Take 1 tablet (2.5 mg) by mouth daily.   apixaban ANTICOAGULANT (ELIQUIS) 5 MG tablet  Self, Other No No   Sig: Take 1 tablet (5 mg) by mouth 2 times daily.   calcitRIOL (ROCALTROL) 0.25 MCG capsule  Self, Other No No   Sig: Take 1 capsule (0.25 mcg) by mouth daily.   diphenhydrAMINE (BENADRYL) 12.5 MG/5ML elixir   No No   Sig: Take 20 mLs (50 mg) by mouth every 6 hours as needed for allergies (if reaction after linezolid).   estradiol (ESTRACE) 0.1 MG/GM vaginal cream  Self, Other Yes No   Sig: Place vaginally every other day.   evolocumab (REPATHA SURECLICK) 140 MG/ML prefilled autoinjector  Self, Other No No   Sig: Inject 1 mL (140 mg) subcutaneously every 14 days. . Please have fasting labs drawn for further refills.   ezetimibe (ZETIA) 10 MG tablet  Self, Other No No   Sig: Take 1 tablet (10 mg) by mouth daily.   folic acid (FOLVITE) 1 MG tablet  Self, Other No No   Sig: TAKE 1 TABLET BY MOUTH DAILY   gabapentin (NEURONTIN) 250 MG/5ML solution  Self, Other No No   Sig: Take 6 mLs (300 mg) by mouth  at bedtime   glucose (BD GLUCOSE) 5 g chewable tablet  Self, Other No No   Sig: Take 2 tablets (10 g) by mouth as needed (low blood sugar)   hydrALAZINE (APRESOLINE) 50 MG tablet   No No   Sig: Take 1 tablet (50 mg) by mouth 3 times daily. Hold for SBP <120mmHg if having diarrhea, otherwise hold if SBP <110mmHg.   ketoconazole (NIZORAL) 2 % external cream  Self, Other No No   Sig: Apply topically 2 times daily as needed (redness and flaking). Apply to the face.   ketoconazole (NIZORAL) 2 % external shampoo  Self, Other No No   Sig: Apply topically three times a week. Lather in the shower, wait 3-5 minutes before rinsing.   levothyroxine (SYNTHROID/LEVOTHROID) 175 MCG tablet  Self, Other No No   Sig: Take 1 tablet (175 mcg) by mouth daily.   linagliptin (TRADJENTA) 5 MG TABS tablet  Self, Other No No   Sig: Take 1 tablet (5 mg) by mouth daily.   meclizine (ANTIVERT) 25 MG tablet  Self, Other No No   Sig: Take 1 tablet (25 mg) by mouth as needed for dizziness or other (migraines)   metFORMIN (GLUCOPHAGE XR) 500 MG 24 hr tablet   No No   Sig: Take 1 tablet (500 mg) by mouth daily (with dinner).   metoprolol succinate ER (TOPROL XL) 25 MG 24 hr tablet   Yes No   Sig: Take 1 tablet (25 mg) by mouth daily.   omega-3 acid ethyl esters (LOVAZA) 1 g capsule  Self, Other No No   Sig: Take 1 capsule by mouth 2 times daily.   predniSONE (DELTASONE) 10 MG tablet   No No   Sig: Take 1 tablet (10 mg) by mouth daily.   sirolimus (GENERIC EQUIVALENT) 1 MG tablet  Self, Other No No   Sig: Take 1 tablet (1 mg) by mouth daily.   triamcinolone (KENALOG) 0.1 % external ointment   No No   Sig: Apply topically 2 times daily. Use 2 weeks or less.   ursodiol (ACTIGALL) 250 MG tablet  Self, Other No No   Sig: Take 1 tablet (250 mg) by mouth 2 times daily.      Facility-Administered Medications: None        Physical Exam   Vital Signs: Temp: 98.6  F (37  C) Temp src: Oral BP: 117/42 Pulse: 88   Resp: 18 SpO2: 100 % O2 Device: Nasal cannula  Oxygen Delivery: 2 LPM  Weight: 0 lbs 0 oz    General: NAD, laying in bed, thin, fatigued but responsive  HEENT: Atraumatic, normocephalic, MMM, EOMI. Well-healing incision on left cheek  Cards: RRR, no m/r/g appreciated, trace bilateral LE edema  Resp: CTAB, no WOB at rest  Abd: soft, non-distended, non-tender to palpation. Suprapubic area tender on palpation  MSK/Skin: warm, dry, no rashes or lesions appreciated on visible skin  Neuro: A&O x3. No focal deficits     Medical Decision Making

## 2025-04-16 ENCOUNTER — MEDICAL CORRESPONDENCE (OUTPATIENT)
Dept: HEALTH INFORMATION MANAGEMENT | Facility: CLINIC | Age: 75
End: 2025-04-16

## 2025-04-16 ENCOUNTER — APPOINTMENT (OUTPATIENT)
Dept: OCCUPATIONAL THERAPY | Facility: CLINIC | Age: 75
End: 2025-04-16
Payer: MEDICARE

## 2025-04-16 ENCOUNTER — APPOINTMENT (OUTPATIENT)
Dept: CT IMAGING | Facility: CLINIC | Age: 75
DRG: 872 | End: 2025-04-16
Attending: INTERNAL MEDICINE
Payer: MEDICARE

## 2025-04-16 DIAGNOSIS — Z53.9 DIAGNOSIS NOT YET DEFINED: Primary | ICD-10-CM

## 2025-04-16 LAB
ALBUMIN SERPL BCG-MCNC: 2.9 G/DL (ref 3.5–5.2)
ALP SERPL-CCNC: 86 U/L (ref 40–150)
ALT SERPL W P-5'-P-CCNC: 15 U/L (ref 0–50)
ANION GAP SERPL CALCULATED.3IONS-SCNC: 10 MMOL/L (ref 7–15)
AST SERPL W P-5'-P-CCNC: 17 U/L (ref 0–45)
BILIRUB SERPL-MCNC: <0.2 MG/DL
BUN SERPL-MCNC: 36.2 MG/DL (ref 8–23)
CALCIUM SERPL-MCNC: 8.5 MG/DL (ref 8.8–10.4)
CHLORIDE SERPL-SCNC: 108 MMOL/L (ref 98–107)
CREAT SERPL-MCNC: 1.68 MG/DL (ref 0.51–0.95)
EGFRCR SERPLBLD CKD-EPI 2021: 31 ML/MIN/1.73M2
ERYTHROCYTE [DISTWIDTH] IN BLOOD BY AUTOMATED COUNT: 18 % (ref 10–15)
GLUCOSE BLDC GLUCOMTR-MCNC: 121 MG/DL (ref 70–99)
GLUCOSE BLDC GLUCOMTR-MCNC: 139 MG/DL (ref 70–99)
GLUCOSE BLDC GLUCOMTR-MCNC: 172 MG/DL (ref 70–99)
GLUCOSE BLDC GLUCOMTR-MCNC: 190 MG/DL (ref 70–99)
GLUCOSE BLDC GLUCOMTR-MCNC: 234 MG/DL (ref 70–99)
GLUCOSE BLDC GLUCOMTR-MCNC: 99 MG/DL (ref 70–99)
GLUCOSE SERPL-MCNC: 166 MG/DL (ref 70–99)
HCO3 SERPL-SCNC: 22 MMOL/L (ref 22–29)
HCT VFR BLD AUTO: 28.8 % (ref 35–47)
HGB BLD-MCNC: 8.8 G/DL (ref 11.7–15.7)
MCH RBC QN AUTO: 27.5 PG (ref 26.5–33)
MCHC RBC AUTO-ENTMCNC: 30.6 G/DL (ref 31.5–36.5)
MCV RBC AUTO: 90 FL (ref 78–100)
PLATELET # BLD AUTO: 312 10E3/UL (ref 150–450)
POTASSIUM SERPL-SCNC: 3.8 MMOL/L (ref 3.4–5.3)
PROT SERPL-MCNC: 5.4 G/DL (ref 6.4–8.3)
RBC # BLD AUTO: 3.2 10E6/UL (ref 3.8–5.2)
SIROLIMUS BLD-MCNC: 3.4 UG/L (ref 5–15)
SODIUM SERPL-SCNC: 140 MMOL/L (ref 135–145)
TME LAST DOSE: ABNORMAL H
TME LAST DOSE: ABNORMAL H
WBC # BLD AUTO: 11.5 10E3/UL (ref 4–11)

## 2025-04-16 PROCEDURE — 250N000013 HC RX MED GY IP 250 OP 250 PS 637

## 2025-04-16 PROCEDURE — 120N000011 HC R&B TRANSPLANT UMMC

## 2025-04-16 PROCEDURE — 250N000012 HC RX MED GY IP 250 OP 636 PS 637

## 2025-04-16 PROCEDURE — 250N000011 HC RX IP 250 OP 636: Mod: JZ

## 2025-04-16 PROCEDURE — 99233 SBSQ HOSP IP/OBS HIGH 50: CPT | Mod: 24 | Performed by: INTERNAL MEDICINE

## 2025-04-16 PROCEDURE — 82310 ASSAY OF CALCIUM: CPT

## 2025-04-16 PROCEDURE — 80195 ASSAY OF SIROLIMUS: CPT | Performed by: NURSE PRACTITIONER

## 2025-04-16 PROCEDURE — 97165 OT EVAL LOW COMPLEX 30 MIN: CPT | Mod: GO | Performed by: OCCUPATIONAL THERAPIST

## 2025-04-16 PROCEDURE — G0545 PR INHRENT VISIT TO INPT/OBS W CNFRM/SUSPCT INFCT DIS BY INFCT DIS SPCIALST: HCPCS | Performed by: INTERNAL MEDICINE

## 2025-04-16 PROCEDURE — 97530 THERAPEUTIC ACTIVITIES: CPT | Mod: GO | Performed by: OCCUPATIONAL THERAPIST

## 2025-04-16 PROCEDURE — G0179 MD RECERTIFICATION HHA PT: HCPCS | Performed by: STUDENT IN AN ORGANIZED HEALTH CARE EDUCATION/TRAINING PROGRAM

## 2025-04-16 PROCEDURE — 74176 CT ABD & PELVIS W/O CONTRAST: CPT

## 2025-04-16 PROCEDURE — 250N000011 HC RX IP 250 OP 636

## 2025-04-16 PROCEDURE — 74176 CT ABD & PELVIS W/O CONTRAST: CPT | Mod: 26 | Performed by: RADIOLOGY

## 2025-04-16 PROCEDURE — 99233 SBSQ HOSP IP/OBS HIGH 50: CPT | Mod: GC | Performed by: INTERNAL MEDICINE

## 2025-04-16 PROCEDURE — 36415 COLL VENOUS BLD VENIPUNCTURE: CPT | Performed by: NURSE PRACTITIONER

## 2025-04-16 PROCEDURE — 85014 HEMATOCRIT: CPT

## 2025-04-16 PROCEDURE — 999N000044 HC STATISTIC CVC DRESSING CHANGE

## 2025-04-16 RX ORDER — ACETAMINOPHEN 500 MG
1000 TABLET ORAL 2 TIMES DAILY
COMMUNITY

## 2025-04-16 RX ORDER — HYDROCODONE BITARTRATE AND ACETAMINOPHEN 5; 325 MG/1; MG/1
1 TABLET ORAL EVERY 6 HOURS PRN
Status: ON HOLD | COMMUNITY
End: 2025-04-23

## 2025-04-16 RX ORDER — DIPHENHYDRAMINE HCL 25 MG
25 TABLET ORAL EVERY 6 HOURS PRN
COMMUNITY

## 2025-04-16 RX ORDER — IOPAMIDOL 755 MG/ML
103 INJECTION, SOLUTION INTRAVASCULAR ONCE
Status: DISCONTINUED | OUTPATIENT
Start: 2025-04-16 | End: 2025-04-16

## 2025-04-16 RX ORDER — OMEGA-3-ACID ETHYL ESTERS 1 G/1
1 CAPSULE, LIQUID FILLED ORAL 2 TIMES DAILY
Status: DISCONTINUED | OUTPATIENT
Start: 2025-04-16 | End: 2025-04-23 | Stop reason: HOSPADM

## 2025-04-16 RX ADMIN — URSODIOL 250 MG: 250 TABLET ORAL at 19:56

## 2025-04-16 RX ADMIN — DIPHENHYDRAMINE HYDROCHLORIDE 25 MG: 50 INJECTION, SOLUTION INTRAMUSCULAR; INTRAVENOUS at 22:39

## 2025-04-16 RX ADMIN — ACETAMINOPHEN 650 MG: 325 TABLET, FILM COATED ORAL at 14:14

## 2025-04-16 RX ADMIN — ERTAPENEM SODIUM 1 G: 1 INJECTION, POWDER, LYOPHILIZED, FOR SOLUTION INTRAMUSCULAR; INTRAVENOUS at 22:41

## 2025-04-16 RX ADMIN — OMEGA-3-ACID ETHYL ESTERS 1 G: 1 CAPSULE, LIQUID FILLED ORAL at 22:39

## 2025-04-16 RX ADMIN — GABAPENTIN 300 MG: 250 SOLUTION ORAL at 22:39

## 2025-04-16 RX ADMIN — URSODIOL 250 MG: 250 TABLET ORAL at 09:40

## 2025-04-16 RX ADMIN — LEVOTHYROXINE SODIUM 175 MCG: 0.03 TABLET ORAL at 09:39

## 2025-04-16 RX ADMIN — EZETIMIBE 10 MG: 10 TABLET ORAL at 09:39

## 2025-04-16 RX ADMIN — POLYETHYLENE GLYCOL 3350 17 G: 17 POWDER, FOR SOLUTION ORAL at 09:40

## 2025-04-16 RX ADMIN — SIROLIMUS 1 MG: 1 TABLET, FILM COATED ORAL at 09:39

## 2025-04-16 RX ADMIN — INSULIN ASPART 1 UNITS: 100 INJECTION, SOLUTION INTRAVENOUS; SUBCUTANEOUS at 22:41

## 2025-04-16 RX ADMIN — APIXABAN 5 MG: 5 TABLET, FILM COATED ORAL at 09:40

## 2025-04-16 RX ADMIN — METOPROLOL SUCCINATE 25 MG: 25 TABLET, EXTENDED RELEASE ORAL at 09:39

## 2025-04-16 RX ADMIN — APIXABAN 5 MG: 5 TABLET, FILM COATED ORAL at 19:56

## 2025-04-16 RX ADMIN — PREDNISONE 10 MG: 10 TABLET ORAL at 09:39

## 2025-04-16 ASSESSMENT — ACTIVITIES OF DAILY LIVING (ADL)
ADLS_ACUITY_SCORE: 65
PREVIOUS_RESPONSIBILITIES: MEAL PREP;SHOPPING
ADLS_ACUITY_SCORE: 65

## 2025-04-16 NOTE — PROGRESS NOTES
[facility] EMERGENCY DEPARTMENT  [address] [address]  Phone: [phone]    Patient:  Luz Thompson, Date of birth 1950  MRN: [id number]  4/14/2025  Date of Visit:  04/16/2025  Referring Provider Thomas Luz  Reason for consult: fever      Assessment & Plan    Recommendations:   4/14 blood cultures negative - okay to stop vancomycin  Continue ertapenem 1 gram IV every 24 hours  If she has diarrhea obtain C diff testing  CT resulted this evening - if her exam is stable overnight and there are no clinical changes than colorectal surgery can be consulted in the AM. If there are clinical changes than a more urgent evaluation will be needed.   Messaged with the medicine team as well as on-call team this evening. Transplant ID will continue to follow.     Sindhu Del Rio D.O.   Infectious Diseases  Contact information available via [facility] Paging/Directory       Assessment:  76 y/o female with a history of a liver transplant (2006), recent Enterococcus faecalis bacteremia and K pneumoniae pyelonephritis s/p ertapenem (completed 3/28/25) and vancomycin (completed 3/22/25), recurrent polymicrobial ESBL UTI (dx 4/7/25) on ertapenem who presented on 4/14/25 for generalized weakness subsequently found to have possible diverticulitis and microperforation. .     Generalized weakness  Leukocytosis, improved  Abdominal pain 2/2 to possible diverticulitis w/ a contained microperforation  Requested CT in the setting of low grade fever and abdominal pain. 4/16 CT A/P noted colonic diverticulosis, trace fluid, fat stranding, and edema around the distal sigmoid colon. Increased size of a fluid density w/ a focus of air (2.3 x 1.7 cm). This may represent diverticulitis with a contained microperforation. Colorectal surgery consult to review CT and provide recommendations on surgical vs medical management.     ESBL polymicrobial pyelonephritis, improved  Restarted ertapenem ~ 4/8 after a 4/7  urine culture resulted with ESBL K oxytoca and ESBL Proteus vulgarius. 4/14 UA demonstrates an improved UA profile. Urine culture < 10K normal louann. 4/14 blood cultures are negative. 4/16 CT notes that she has non-specific bilateral perinephric fat stranding, left nephrolithiasis 4 mm vs vascular calcification. Remains on ertapenem.     Previous ID issues:   Enterococcus faecalis bacteremia  3/8 blood culture positive for E faecalis.  TTE was negative for endocarditis. Suspect source is from an episode of multiple loose stools and abdominal pain with transient bacterial translocation. Attempted to transition vancomycin to linezolid however had mouth numbness similar to her adverse reactions to antibiotics that makes her uncomfortable with this plan at home. Completed vancomycin on 3/22/25.      Klebsiella pneumoniae pyelonephritis  3/8 urine culture with Klebsiella pneumoniae and on the liver US 3/8, there is comment of echogenic renal parenchyma and in conjunction of symptoms of intermittent flank pain for a year and pyuria on urine studies for months, favoring treating for an extended course for pyelonephritis. Her antibiotic allergies complicate antibiotic choices here, the only oral option she has tolerated fosfomycin is resistant. Completed course with IV ertapenem due to allergies - completed 3/28/25. At the end of treatment will plan for a surveillance urinary culture 1 week after ertapenem is complete which prompted the UA on 4/7.     Hx coccidioides  Dx in winter [id number], did not tolerate fluconazole due to severe rash. Treated with voriconazole, continued until her return to [place]. CXR with stable calcifications. Recent fungal antibody panel negative though unclear how reliable this is. Voriconazole was stopped 6/2024. Cocci antigen was negative.     Hx EBV viremia s/p rituxan 2016: No lymphadenopathy on recent abdomen/pelvis CT. Not detected 1/20/2025     #Numerous reported antimicrobial allergies  Per  "allergy note 8/2/2019 \"Prick and intradermal and patch testing to fluconazole, doxycycline, azithromycin, penicillins, and cephalosporins with immediate and delayed readings being negative.\" States she will never take fluconazole again. Would be candidate for oral provocation testing in future with pcn. Also notes an allergy to the  capsules, states tolerates pills or liquid formulations, sometimes needs them compounded.     Transplant check list:  - Current immunosuppression: sirolimus, prednisone  - QTc interval: 409 ms on 4/14/25  - Bacterial coverage: as above  - Pneumocystis prophylaxis: none  - Serostatus & viral prophylaxis: EBV R+, CMV R-  - Fungal prophylaxis: none, previously voriconazole  - Risk factors to suggest check of Toxoplasma, Strongy, or Schisto serology?: toxoplasma negative 1/20/25  - Immunization status: Up to date on influenza, COVID; due for Tdap  - Gamma globulin status: 1110 on 8/5/2019  - Isolation status: Good Contact for ESBL history  - Code status: Full Code    Medical Decision Making       50 MINUTES SPENT BY ME on the date of service doing chart review, history, exam, documentation & further activities per the note.    This patient visit is eligible for billing by the  code      History of Present Illness   Pertinent history obtain from: chart review and patient  Last seen yesterday.   Afebrile. WBC decreased to 11.5.   Overnight possible concern for an ertapenem reaction.   Blood cultures negative  C diff test not obtained  Bilateral duplex US completed - Baker's cyst in the right popiteal fosa  Planning for a CT today - resulted tonight    She continues to have intermittent abdominal pain. No bowel movements overnight. No subjective fevers or chills.     Physical Exam     Vital signs:  BP (!) 108/39 (BP Location: Left arm, Patient Position: Semi-Mckinley's, Cuff Size: Adult Regular)   Pulse 103   Temp 98.2  F (36.8  C) (Oral)   Resp 14   Ht 1.702 m (5' 7\")   Wt " 76.4 kg (168 lb 6.4 oz)   LMP 06/01/1988 (Approximate)   SpO2 92%   BMI 26.38 kg/m      GENERAL: chronically ill appearing, weak, laying in bed  ENT: edentulous, no sores in mouth   NECK: no adenopathy  RESP: lungs clear to auscultation - no rales, rhonchi or wheezes, RA, no accessory muscle use  CV: regular rate and rhythm, normal S1 S2, no murmur,, trace peripheral edema  ABDOMEN: soft, tenderness over the left abdomen and suprapubic area with palpation but improved from yesterday, bowel sounds normal; urinary wick and depends in place  MS and SKIN: no joint swelling, bilateral erythematous skin areas over the lower calves  NEURO: Normal strength and tone, mentation intact and speech normal. Alert. Oriented.     Data   Laboratory data and imaging listed below was reviewed prior to this encounter.     Microbiology:    Culture   Date Value Ref Range Status   04/14/2025 No growth after 1 day  Preliminary   04/14/2025 <10,000 CFU/mL Urogenital louann  Final   04/14/2025 No growth after 1 day  Preliminary   04/07/2025 50,000-100,000 CFU/mL Klebsiella oxytoca ESBL (A)  Final   04/07/2025 50,000-100,000 CFU/mL Proteus vulgaris ESBL (A)  Final   03/09/2025 No Growth  Final   03/09/2025 No Growth  Final   03/08/2025 No Growth  Final   03/08/2025 >100,000 CFU/mL Klebsiella pneumoniae (A)  Final   03/08/2025 Positive on the 1st day of incubation (A)  Final   03/08/2025 Enterococcus faecalis (AA)  Final     Comment:     2 of 2 bottles   02/07/2025 10,000-50,000 CFU/mL Mixture of Urogenital Louann  Final   01/22/2025 10,000-50,000 CFU/mL Mixture of urogenital louann  Final   01/21/2025 >100,000 CFU/mL Mixture of Urogenital Louann  Final   01/20/2025 No Growth  Final   01/20/2025 No Growth  Final   01/20/2025 No Growth  Final   01/19/2025 <10,000 CFU/mL Mixture of Urogenital Louann  Final   01/19/2025 No Growth  Final   01/19/2025 Positive on the 1st day of incubation (A)  Final   01/19/2025 Staphylococcus epidermidis (AA)  Final      Comment:     1 of 2 bottles  The recovery of this organism from a single blood culture bottle most likely represents contamination. If susceptibility testing is needed, please refer to the antibiogram or contact [facility]. If contamination is suspected, it is important to repeat two sets of blood cultures from two different sites.   01/10/2025 <10,000 CFU/mL Urogenital louann  Final   01/07/2025 No Growth  Final   01/06/2025 No Growth  Final   01/03/2025 No Growth  Final   01/03/2025 No Growth  Final   12/10/2024 No Growth  Final   12/10/2024 No Growth  Final   12/10/2024 >100,000 CFU/mL Klebsiella pneumoniae (A)  Final   11/06/2024 No Growth  Final   10/11/2024 No Growth  Final   10/11/2024 50,000-100,000 CFU/mL Mixture of Urogenital Louann  Final   09/30/2024 No Growth  Final   09/22/2024 >100,000 CFU/mL Klebsiella oxytoca ESBL (A)  Final   09/22/2024 Positive on the 1st day of incubation (A)  Final   09/22/2024 Klebsiella oxytoca ESBL (AA)  Final     Comment:     2 of 2 bottles   09/22/2024 Pseudomonas aeruginosa (AA)  Final     Comment:     1 of 2 bottles   08/20/2024 >100,000 CFU/mL Klebsiella oxytoca ESBL (A)  Final   , Inflammatory Markers:   Recent Labs   Lab Test 04/15/25  0613 08/26/19  2331 05/20/19  0957 07/30/18  0855 09/03/17  0912 09/02/17  0648 09/01/17  0832   SED 61* 78* 47* 73*  --   --   --    CRP  --  180.0* <2.9 5.2 64.0* 71.0* 51.0*   , Urine Studies:    Recent Labs   Lab Test 04/14/25  1430 04/07/25  0945 04/07/25  0935 03/08/25  1737 02/07/25  0215   LEUKEST Moderate* Large* Large* Large* Small*   WBCU 85* >182* >100* >182* 10-25*   , Hematology Studies:    Recent Labs   Lab Test 04/16/25  0611 04/15/25  0613 04/14/25  1410 04/07/25  0945 04/04/25  1159 03/19/25  1230 01/04/25  0742 01/03/25  1733 10/11/24  2212 09/30/24  1545 08/27/19  0532 08/26/19  2331 05/19/18  0629 05/18/18  0731 09/03/17  0912 09/02/17  0648 09/01/17  0832   WBC 11.5* 15.1* 14.8* 6.5 9.8 7.2   < > 9.2   < > 8.2   < >  12.5*   < > 8.0   < > 10.2 9.4   ANEU  --   --   --   --   --   --   --  6.9  --  3.7  --  10.8*  --  5.0  --  7.4 6.5   AEOS  --   --   --   --   --   --   --  0.0  --  0.2  --  0.0  --  0.1  --  0.2 0.1   HGB 8.8* 9.2* 9.9* 9.1* 10.8* 9.7*   < > 10.2*   < > 11.1*   < > 10.5*   < > 11.6*   < > 10.5* 11.1*   MCV 90 90 88 88 86 88   < > 89   < > 93   < > 86   < > 86   < > 89 87    331 369 351 376 400   < > 418   < > 388   < > 226   < > 324   < > 259 269    < > = values in this interval not displayed.    , Metabolic Studies:   Recent Labs   Lab Test 04/16/25  0611 04/15/25  0613 04/14/25  1410 04/07/25  0945 04/04/25  1159    141 141 139 140   POTASSIUM 3.8 3.4 3.4 4.0 4.7   CHLORIDE 108* 107 107 105 105   CO2 22 23 22 21* 18*   BUN 36.2* 26.6* 25.9* 33.1* 36.7*   CR 1.68* 1.69* 1.42* 1.83* 1.79*   GFRESTIMATED 31* 31* 38* 28* 29*   , and Hepatic Studies:   Recent Labs   Lab Test 04/16/25  0611 04/15/25  0613 04/14/25  1410 04/07/25  0945 04/04/25  1159 03/10/25  0614 03/09/25  1043   BILITOTAL <0.2 0.3 0.3  --  0.4 0.2 0.4   ALKPHOS 86 74 84  --  137 97 102   ALBUMIN 2.9* 2.8* 3.3* 3.5 3.6 3.0* 3.2*   AST 17 17 16  --  104* 37 46*   ALT 15 12 12 40 88* 87* 84*

## 2025-04-16 NOTE — PROGRESS NOTES
Admitted/transferred from: ED  Time of arrival on unit ~2100  2 RN full  skin assessment completed by Dianna CRUZ and Catina ALY RNs  Skin assessment finding: issues found L cheek resection of cancer; redness on coccyx covered with preventative mepilex; redness in groin folds    Interventions/actions: skin interventions mepilex C/D/I, encourage activity OOB      Will continue to monitor.

## 2025-04-16 NOTE — PLAN OF CARE
"Goal Outcome Evaluation:      Plan of Care Reviewed With: patient    Overall Patient Progress: no changeOverall Patient Progress: no change    Outcome Evaluation: AVSS on 1 LPM NC; denied pain and nausea; C/O reaction when starting Ertapenem so wanted infusion stopped, MD notified, diverted to primary team in AM.    BP 99/43 (BP Location: Left arm)   Pulse 71   Temp 98.2  F (36.8  C) (Oral)   Resp 18   Ht 1.702 m (5' 7\")   Wt 76.4 kg (168 lb 6.4 oz)   LMP 06/01/1988 (Approximate)   SpO2 97%   BMI 26.38 kg/m      Shift: 9090-4500  Isolation Status: contact for ESBL; enteric for C. Diff rule out; cytotoxic  VS: stable on 1 LPM NC, afebrile  Neuro: A&O x4  Behaviors: receptive to cares  BG: ACHS with 0200: 229,197, 190  Labs/Imaging: WBC 15.1, Creatinine 1.69; pending AM labs  Respiratory: [place]  Cardiac: WNL  Pain/Nausea: denied  PRN: Benadryl 25 mg IV x1 for reaction to abx  Diet: diabetic 60 g CHO  LDA: R SL midline  Infusion(s): Ertapenem IV attempted, pt C/O \"tingling in lips\" which she gets with abx reactions, infusion stopped and MD notified  GI/: LBM 4/15, needs stool sample. Voiding spontaneously, purewick on overnight, ~ *** mL CYU, bedside commode/bathroom during the day  Skin: L cheek wound d/t cancer resection; redness on sacrum, preventative mepilex C/D/I; redness in groin folds  Mobility: Ax1-2 with GB and walker  Plan: Notify MD of any significant changes, continue POC.   "

## 2025-04-16 NOTE — PROVIDER NOTIFICATION
Paged Jh Night:    Pt has a new abx due. Pt refused to let me start. She is asking for Benadryl to be ordered and be ready at hand in case she get a reaction. Pt stated she is allergic to a lot of abx. Thank you

## 2025-04-16 NOTE — PHARMACY-ADMISSION MEDICATION HISTORY
Pharmacist Admission Medication History    Admission medication history is complete. The information provided in this note is only as accurate as the sources available at the time of the update.    Information Source(s): Patient and CareEverywhere/SureScripts via in-person. Patient was knowledgeable about their medication regimen.     Pertinent Information:   1) Patient requesting PTA [place]-3 start while inpatient. Per pt report Benefiber and omega-3 scheduled use is effective at preventing constipation.  2) Patient has many drug allergies (Benadryl PRN is already ordered), after discussion with pt: removed Benadryl allergy listed from 4/12/2010 (inaccurate) and ADDED carvedilol (*details below*). Of note, pt only uses gabapentin soln d/t past intolerance to additives in the capsules (*soln is the ordered formulation for this admission at time of this note*).  3) Patient last Repatha (q14d dosing) subcutaneous injection was on 4/13/25.  4) Patient reports missing hydralazine noon doses at home more often than not - caution/monitoring if reordering PTA TID SIG while inpatient.  5) Patient confirms Norco short course dispensed 4/8/2024 for breakthrough pain -> has not needed any - pain controlled with <2g APAP/d.  6) Patient had recent decreased prednisone trial (10mg/d -> 5 mg/d, resulting in severe arthralgia pain). Completed high dose short course and taper -> on 10mg/d PTA again.    Changes made to PTA medication list:  Added: Ertapenem; nonformulary (Momma Bear Nerve Lotion)  Deleted: Metformin, Ketoconazole cream (still takes shampoo)  Changed: APAP 650 q4h PRN -> 1000 BID sched, Benadryl elixir -> tablet, Iron tab BID PRN -> every day sched, meclizine (specified SIG) PRN dizziness/migraines -> PRN dizziness (no migraines since younger per pt)    Allergies reviewed with patient and updates made in EHR: yes - added carvedilol after recent trial resulted in severe diarrhea and LE swelling, removed Benadryl  (insomnia listed in 2010, pt has tolerated several times since this date) - confirmed no other omissions.    Medication History Completed By: Venkata Mcdermott [facility] 4/16/2025 11:24 AM    Does patient/source state they fill prescriptions at pharmacies outside retrievable fill history? YES, many long term/maintenance medications (including IS meds) mailed through [facility].    PTA Med List   Medication Sig Last Dose/Taking    acetaminophen (TYLENOL) 500 MG tablet Take 1,000 mg by mouth 2 times daily. During 4/16/25 med recc pt states take BID everyday 4/13/2025 Evening    amLODIPine (NORVASC) 2.5 MG tablet Take 1 tablet (2.5 mg) by mouth daily. 4/13/2025 Morning    apixaban ANTICOAGULANT (ELIQUIS) 5 MG tablet Take 1 tablet (5 mg) by mouth 2 times daily. 4/13/2025 Evening    calcitRIOL (ROCALTROL) 0.25 MCG capsule Take 1 capsule (0.25 mcg) by mouth daily. 4/13/2025 Morning    D-MANNOSE PO Take 1 Scoop by mouth 2 times daily. 4/13/2025 Evening    diphenhydrAMINE (BENADRYL) 25 MG tablet Take 25 mg by mouth every 6 hours as needed for itching or allergies. Unknown    EPINEPHrine (ANY BX GENERIC EQUIV) 0.3 MG/0.3ML injection 2-pack Inject 0.3 mLs (0.3 mg) into the muscle as needed for anaphylaxis. May repeat one time in 5-15 minutes if response to initial dose is inadequate. Unknown    ERTAPENEM SODIUM IV Inject 1 g into the vein daily. 4/13/2025    estradiol (ESTRACE) 0.1 MG/GM vaginal cream Place vaginally every other day. 4/13/2025    evolocumab (REPATHA SURECLICK) 140 MG/ML prefilled autoinjector Inject 1 mL (140 mg) subcutaneously every 14 days. . Please have fasting labs drawn for further refills. 4/13/2025    ezetimibe (ZETIA) 10 MG tablet Take 1 tablet (10 mg) by mouth daily. 4/13/2025 Morning    Ferrous Sulfate 324 MG TBEC Take 1 tablet by mouth daily. 4/13/2025 Morning    folic acid (FOLVITE) 1 MG tablet TAKE 1 TABLET BY MOUTH DAILY 4/13/2025 Morning    gabapentin (NEURONTIN) 250 MG/5ML solution Take 6 mLs (300 mg)  by mouth at bedtime 4/13/2025 Bedtime    glucose (BD GLUCOSE) 5 g chewable tablet Take 2 tablets (10 g) by mouth as needed (low blood sugar) Unknown    hydrALAZINE (APRESOLINE) 50 MG tablet Take 1 tablet (50 mg) by mouth 3 times daily. Hold for SBP <120mmHg if having diarrhea, otherwise hold if SBP <110mmHg. (Patient taking differently: Take 50 mg by mouth 3 times daily. Hold for SBP <120mmHg if having diarrhea, otherwise hold if SBP <110mmHg.  PTA does not take lunch dose most days) 4/13/2025 Evening    ketoconazole (NIZORAL) 2 % external shampoo Apply topically three times a week. Lather in the shower, wait 3-5 minutes before rinsing. 4/13/2025    levothyroxine (SYNTHROID/LEVOTHROID) 175 MCG tablet Take 1 tablet (175 mcg) by mouth daily. 4/13/2025 Morning    linagliptin (TRADJENTA) 5 MG TABS tablet Take 1 tablet (5 mg) by mouth daily. 4/13/2025 Morning    meclizine (ANTIVERT) 25 MG tablet Take 1 tablet (25 mg) by mouth as needed for dizziness or other (migraines) (Patient taking differently: Take 25 mg by mouth as needed for dizziness.) Unknown    metoprolol succinate ER (TOPROL XL) 25 MG 24 hr tablet Take 1 tablet (25 mg) by mouth daily. 4/13/2025    Multiple Vitamins-Minerals (PRESERVISION AREDS 2) CAPS Take 1 capsule by mouth 2 times daily 4/13/2025 Evening    NONFORMULARY Apply 1 Application topically daily as needed. Momma Bear Nerve Lotion - pt uses on feet Unknown    Nutrisource Fiber PO packet Take 1 packet by mouth daily. Benefiber 4/13/2025 Morning    omega-3 acid ethyl esters (LOVAZA) 1 g capsule Take 1 capsule by mouth 2 times daily. 4/13/2025 Evening    predniSONE (DELTASONE) 10 MG tablet Take 1 tablet (10 mg) by mouth daily. 4/13/2025 Morning    sirolimus (GENERIC EQUIVALENT) 1 MG tablet Take 1 tablet (1 mg) by mouth daily. 4/13/2025 Morning    triamcinolone (KENALOG) 0.1 % external ointment Apply topically 2 times daily. Use 2 weeks or less. Unknown    ursodiol (ACTIGALL) 250 MG tablet Take 1 tablet  (250 mg) by mouth 2 times daily. 4/13/2025 Evening

## 2025-04-16 NOTE — PROGRESS NOTES
[facility]    Medicine Progress Note - Medicine Service, JOVANNY TEAM 3    Date of Admission:  4/14/2025    Assessment & Plan      76 yo w/ PMHx of primary biliary cirrhosis s/p liver transplant in 2002, paroxysmal atrial fibrillation on apixaban, HTN, T2DM, CKD, HF, hx of CVA, hypothyroidism, recurrent ESBL UTIs with recent E. Faecalis bacteremia admission last month on IV ertapenem via PICC admitted for generalized weakness likely 2/2 infection    Today:  Discontinued Vancomycin  Cont ertapenem 1g q24h  CT A/P pending  PT/OT consulted recs pending  Cultures-NGTD    #generalized weakness likely 2/2 bacteremia  #recurrent UTIs on IV ertapenem since 3/9/2024  #recent E. Faecalis bacteremia  Afebrile and hemodynamically stable with WBC 14.8 on admit though with reported fevers and lethargy. Given UA improved from prior with some leukocyte esterases, consideration for bacteremia 2/2 PICC line over recurrent UTI/pyelonephritis. Reports suprapubic tenderness. CXR w/ mild pulmonary edema. Recent admission in March 2025 for Klebsiella pneumonia UTI and E. Faecalis bacteremia with recommendations from ID for ertapenem 1g q24h until EOT 4/22. RVP negative. Per pt missed ertapenem from 4/8-4/13.  Abx:   vancomycin: 4/14 - 4/16  ertapenem: 3/9 - 4/8, 4/14 - **  meropenem: 3/8 - 3/9  Cultures:  PICC line blood cultures, pending  Urine culture, <10 000 CFU Urogenital louann  Transplant ID consulted, appreciate recs  PT/OT consult    #Liver transplant in 2002 2/2 primary biliary cirrhosis  Follows Dr. Ramirez. Has missed 2 days of medication.  PTA sirolimus 1 mg qD  PTA prednisone 10 mg qD  PTA ursodiol 250 mg BID  GI/Liver transplant consult, appreciate recs    #CKD3b w/ moderate proteinuria, at baseline  #HTN  #?heart failure  Baseline Cr. 1.5. History of dialysis at time of liver transplant 23 years ago. Recurrent AKIs and immunosuppressive medication toxicity. Not on SGLT2i 2/2  "recurrent UTI. Chart review w/ unclear heart failure history, unconfirmed with EF 60-65% on 3/10/25. Encourage oral intake for now. BNP 1446 elevated from 1 month ago. Given unclear HF history, trace bilateral LE edema, with some pulm edema noted on imaging, consideration for volume overload. However, without orthopnea or clear cough. Will defer diuresis.  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 Wound does not look infected.  CTM    #anemia, chronic  Hgb 9.9 on admit. Stable.  HOLD PTA ferrous sulfate    #hypothyroidism  PTA levothyroxine 175mcg qD          Diet: Snacks/Supplements Adult: Ensure Enlive; With Meals  Snacks/Supplements Adult: Boost Soothe; With Meals  Moderate Consistent Carb (60 g CHO per Meal) Diet    DVT Prophylaxis: DOAC  Ron Catheter: Not present  Lines: None     Cardiac Monitoring: None  Code Status: Full Code      Clinically Significant Risk Factors          # Hyperchloremia: Highest Cl = 108 mmol/L in last 2 days, will monitor as appropriate          # Hypoalbuminemia: Lowest albumin = 2.8 g/dL at 4/15/2025  6:13 AM, will monitor as appropriate       # Hypertension: Noted on problem list           # DMII: A1C = 6.8 % (Ref range: <5.7 %) within past 6 months, PRESENT ON ADMISSION  # Overweight: Estimated body mass index is 26.38 kg/m  as calculated from the following:    Height as of this encounter: 1.702 m (5' 7\").    Weight as of this encounter: 76.4 kg (168 lb 6.4 oz)., PRESENT ON ADMISSION       # Financial/Environmental Concerns: none         Social " Drivers of Health    Housing Stability: Low Risk  (4/15/2025)    Housing Stability     Do you have housing? : Yes     Are you worried about losing your housing?: No   Recent Concern: Housing Stability - High Risk (1/22/2025)    Housing Stability     Do you have housing? : No     Are you worried about losing your housing?: No   Tobacco Use: Medium Risk (4/8/2025)    Patient History     Smoking Tobacco Use: Former     Smokeless Tobacco Use: Never   Transportation Needs: Low Risk  (4/15/2025)    Transportation Needs     Within the past 12 months, has lack of transportation kept you from medical appointments, getting your medicines, non-medical meetings or appointments, work, or from getting things that you need?: No   Recent Concern: Transportation Needs - High Risk (1/27/2025)    Transportation Needs     Within the past 12 months, has lack of transportation kept you from medical appointments, getting your medicines, non-medical meetings or appointments, work, or from getting things that you need?: Yes   Physical Activity: Insufficiently Active (11/9/2023)    Received from [facility]    Exercise Vital Sign     Days of Exercise per Week: 5 days     Minutes of Exercise per Session: 10 min          Disposition Plan     Medically Ready for Discharge: Anticipated in 2-4 Days       Staffed with Dr. Luz.    Juanita Ray MD  Medicine Service, [address]  Securely message with [employer] (more info)  Text page via [facility] Paging/Directory   See signed in provider for up to date coverage information  ______________________________________________________________________    Interval History   Had some tingling around her lips she thought was due to Ertapenem, improved with benadryl. Feeling better today. Denies any cp, sob, dysuria, hematuria, fevers, chills, abd pain, diarrhea, or constipation.    Physical Exam   Vital Signs: Temp: 98.2  F (36.8  C) Temp src: Oral  BP: (!) 108/39 Pulse: 103   Resp: 14 SpO2: 92 % O2 Device: None (Room air) Oxygen Delivery: 1 LPM  Weight: 168 lbs 6.4 oz    Constitutional: awake, alert, NAD, eating breakfast  Eyes: Lids and lashes normal  HENT: NCAT, healing mohs surgery on L cheek  Resp: No increased WOB, CTAB, no w/r/rh  Card: RRR, normal S1 and S2,  no m/r/g  Extrem: Pulses equal throughout, no LE edema  GI: BS normal, soft, non-distended, NTTP, no masses  Skin/integ: no erythema or rash, no jaundice  Neuro: cranial nerves II-XII are grossly intact, uses hearing aids  Neuropsych: alert, answering questions appropriately, interactive, affect full      Medical Decision Making             Data     I have personally reviewed the following data over the past 24 hrs:    11.5 (H)  \   8.8 (L)   / 312     140 108 (H) 36.2 (H) /  99   3.8 22 1.68 (H) \     ALT: 15 AST: 17 AP: 86 TBILI: <0.2   ALB: 2.9 (L) TOT PROTEIN: 5.4 (L) LIPASE: N/A       Imaging results reviewed over the past 24 hrs:   Recent Results (from the past 24 hours)   US Lower Extremity Venous Duplex Bilateral    Narrative    EXAMINATION: DOPPLER VENOUS ULTRASOUND OF BILATERAL LOWER EXTREMITIES,  4/15/2025 10:55 PM     COMPARISON: Ultrasound 6/13/2023. PET/CT 8/8/2017.    HISTORY: Lower extremity pain and edema    TECHNIQUE:  Gray-scale evaluation with compression, spectral flow and  color Doppler assessment of the deep venous system of both legs from  groin to knee, and then at the ankles.    FINDINGS:  In both lower extremities, the common femoral, superficial femoral,  deep profunda, popliteal and posterior tibial veins demonstrate normal  compressibility and blood flow. Duplicated left femoral vein noted.    Anechoic and avascular fluid collection along the right popliteal  fossa measuring 10.7 x 2.0 x 2.7 cm.    Hyperechoic regions with dense posterior shadowing in the subcutaneous  soft tissues of the right ankle may represent calcification from  postprocedural change related to  history of radiofrequency ablation.      Impression    IMPRESSION:  1.  No evidence of deep venous thrombosis in either lower extremity.  2.  Large cystic structure in the right popliteal fossa likely  represents a Baker's cyst measures up to 10.7 cm.  3.  Subcutaneous calcifications in the right ankle demonstrated,  unchanged from prior PET/CT.    I have personally reviewed the examination and initial interpretation  and I agree with the findings.    HERI HELTON MD         SYSTEM ID:  [id number]

## 2025-04-16 NOTE — PROVIDER NOTIFICATION
"Eben Kennedy MD, paged via [employer] that patient felt like she was having a reaction to her Ertapenem (\"tingling in my lips\") and asked nurse to stop infusion, Benadryl 25 mg IV given. Provider said he, \"Will make note and defer to primary team. They can address in the morning\". Will continue current POC.  "

## 2025-04-16 NOTE — PROGRESS NOTES
04/16/25 1513   Appointment Info   Signing Clinician's Name / Credentials (OT) ACOSTA Kirby   Student Supervision Direct supervision provided;Line of sight supervision provided   Living Environment   People in Home alone   Current Living Arrangements independent living facility   Home Accessibility no concerns   Transportation Anticipated family or friend will provide   Living Environment Comments pt reports living in an independent living facility with help avaliable if necessary but not currently receiving any cares.   Self-Care   Usual Activity Tolerance moderate   Current Activity Tolerance fair   Regular Exercise No   Equipment Currently Used at Home grab bar, toilet;grab bar, tub/shower;walker, rolling;hospital bed   Fall history within last six months no   Activity/Exercise/Self-Care Comment pt reports being mod IND w/ ADLs at baseline, has assistance if necessary   Instrumental Activities of Daily Living (IADL)   Previous Responsibilities meal prep;shopping   IADL Comments pt reports family comes throughout the week to assist w/ IADLs   General Information   Onset of Illness/Injury or Date of Surgery 04/14/25   Referring Physician Dianne Pederson MD   Patient/Family Therapy Goal Statement (OT) to return home   Additional Occupational Profile Info/Pertinent History of Current Problem per chart review- 74 yo w/ PMHx of primary biliary cirrhosis s/p liver transplant in 2002, paroxysmal atrial fibrillation on apixaban, HTN, T2DM, CKD, HF, hx of CVA, hypothyroidism, recurrent ESBL UTIs with recent E. Faecalis bacteremia admission last month on IV ertapenem via PICC admitted for generalized weakness likely 2/2 infection   Existing Precautions/Restrictions fall   Limitations/Impairments hearing   Pain Assessment   Patient Currently in Pain Yes, see Vital Sign flowsheet   Posture   Posture forward head position;protracted shoulders   Range of Motion Comprehensive   General Range of Motion  bilateral upper extremity ROM WFL;bilateral lower extremity ROM WFL   Bed Mobility   Bed Mobility scooting/bridging;supine-sit   Scooting/Bridging Will (Bed Mobility) independent   Supine-Sit Will (Bed Mobility) independent   Transfers   Transfers sit-stand transfer;toilet transfer;shower transfer   Sit-Stand Transfer   Sit-Stand Will (Transfers) moderate assist (50% patient effort)   Shower Transfer   Type (Shower Transfer) lateral   Will Level (Shower Transfer) moderate assist (50% patient effort)   Shower Transfer Comments per clinical judgement   Toilet Transfer   Type (Toilet Transfer) sit-stand   Will Level (Toilet Transfer) moderate assist (50% patient effort)   Toilet Transfer Comments per clinical judgement   Activities of Daily Living   BADL Assessment/Intervention bathing;lower body dressing;grooming;toileting   Bathing Assessment/Intervention   Will Level (Bathing) set up;supervision   Comment, (Bathing) per clinical judgement   Lower Body Dressing Assessment/Training   Will Level (Lower Body Dressing) moderate assist (50% patient effort)   Grooming Assessment/Training   Will Level (Grooming) set up;contact guard assist   Comment, (Grooming) per clinical judgement   Toileting   Comment, (Toileting) per clinical judgement   Will Level (Toileting) moderate assist (50% patient effort)   Clinical Impression   Criteria for Skilled Therapeutic Interventions Met (OT) Yes, treatment indicated   OT Diagnosis OT orders for eval and treat as indicated   Influenced by the following impairments generalized weakness, deconditioning and pain   OT Problem List-Impairments impacting ADL problems related to;activity tolerance impaired;pain;strength   Assessment of Occupational Performance 3-5 Performance Deficits   Identified Performance Deficits activity tolerance, functional transfers, LB dressing, toileting   Planned Therapy Interventions (OT) ADL  retraining;IADL retraining;progressive activity/exercise;strengthening   Clinical Decision Making Complexity (OT) problem focused assessment/low complexity   Risk & Benefits of therapy have been explained evaluation/treatment results reviewed;patient   OT Total Evaluation Time   OT Eval, Low Complexity Minutes (57112) 8   OT Goals   Therapy Frequency (OT) 6 times/week   OT Predicted Duration/Target Date for Goal Attainment 04/25/25   OT Goals Lower Body Dressing;Toilet Transfer/Toileting;Aerobic Activity;Upper Body Bathing   OT: Lower Body Dressing Modified independent   OT: Upper Body Bathing Supervision/stand-by assist   OT: Toilet Transfer/Toileting Modified independent;using adaptive equipment   OT: Perform aerobic activity with stable cardiovascular response intermittent activity;5 minutes   Interventions   Interventions Quick Adds Therapeutic Activity   OT Discharge Planning   OT Plan cont to progress act tolerance, OOB mobility, ADLs seated   OT Discharge Recommendation (DC Rec) home with assist;home with home care occupational therapy;Transitional Care Facility   OT Rationale for DC Rec pt presenting below baseline for functional mobilty and ADLs, Aniticipate with IP therapies, pt will progress to home with HH services. Per previous admissions, pt has progressed from TCU to home with HH services. if pt were to d/c in the next 3-4 days, may be candidate for TCU to promote strength and IND with ADLs and functional mobility.   OT Brief overview of current status CGA-mod A w/ 4WW   OT Total Distance Amb During Session (feet) 2   Total Session Time   Timed Code Treatment Minutes 24   Total Session Time (sum of timed and untimed services) 32

## 2025-04-17 ENCOUNTER — APPOINTMENT (OUTPATIENT)
Dept: PHYSICAL THERAPY | Facility: CLINIC | Age: 75
End: 2025-04-17
Payer: MEDICARE

## 2025-04-17 ENCOUNTER — APPOINTMENT (OUTPATIENT)
Dept: OCCUPATIONAL THERAPY | Facility: CLINIC | Age: 75
End: 2025-04-17
Payer: MEDICARE

## 2025-04-17 VITALS
WEIGHT: 168.4 LBS | TEMPERATURE: 97.9 F | DIASTOLIC BLOOD PRESSURE: 60 MMHG | OXYGEN SATURATION: 93 % | HEART RATE: 66 BPM | RESPIRATION RATE: 20 BRPM | SYSTOLIC BLOOD PRESSURE: 141 MMHG | HEIGHT: 67 IN | BODY MASS INDEX: 26.43 KG/M2

## 2025-04-17 LAB
ALBUMIN SERPL BCG-MCNC: 3.2 G/DL (ref 3.5–5.2)
ALP SERPL-CCNC: 87 U/L (ref 40–150)
ALT SERPL W P-5'-P-CCNC: 23 U/L (ref 0–50)
ANION GAP SERPL CALCULATED.3IONS-SCNC: 9 MMOL/L (ref 7–15)
AST SERPL W P-5'-P-CCNC: 23 U/L (ref 0–45)
BACTERIA BLD CULT: NORMAL
BACTERIA BLD CULT: NORMAL
BILIRUB SERPL-MCNC: <0.2 MG/DL
BUN SERPL-MCNC: 37.3 MG/DL (ref 8–23)
CALCIUM SERPL-MCNC: 8.8 MG/DL (ref 8.8–10.4)
CHLORIDE SERPL-SCNC: 109 MMOL/L (ref 98–107)
CREAT SERPL-MCNC: 1.65 MG/DL (ref 0.51–0.95)
EGFRCR SERPLBLD CKD-EPI 2021: 32 ML/MIN/1.73M2
ERYTHROCYTE [DISTWIDTH] IN BLOOD BY AUTOMATED COUNT: 17.8 % (ref 10–15)
GLUCOSE BLDC GLUCOMTR-MCNC: 112 MG/DL (ref 70–99)
GLUCOSE BLDC GLUCOMTR-MCNC: 139 MG/DL (ref 70–99)
GLUCOSE BLDC GLUCOMTR-MCNC: 163 MG/DL (ref 70–99)
GLUCOSE BLDC GLUCOMTR-MCNC: 168 MG/DL (ref 70–99)
GLUCOSE BLDC GLUCOMTR-MCNC: 182 MG/DL (ref 70–99)
GLUCOSE BLDC GLUCOMTR-MCNC: 235 MG/DL (ref 70–99)
GLUCOSE BLDC GLUCOMTR-MCNC: 270 MG/DL (ref 70–99)
GLUCOSE BLDC GLUCOMTR-MCNC: 302 MG/DL (ref 70–99)
GLUCOSE SERPL-MCNC: 118 MG/DL (ref 70–99)
HCO3 SERPL-SCNC: 24 MMOL/L (ref 22–29)
HCT VFR BLD AUTO: 29.7 % (ref 35–47)
HGB BLD-MCNC: 8.8 G/DL (ref 11.7–15.7)
MCH RBC QN AUTO: 27.2 PG (ref 26.5–33)
MCHC RBC AUTO-ENTMCNC: 29.6 G/DL (ref 31.5–36.5)
MCV RBC AUTO: 92 FL (ref 78–100)
PLATELET # BLD AUTO: 343 10E3/UL (ref 150–450)
POTASSIUM SERPL-SCNC: 4.3 MMOL/L (ref 3.4–5.3)
PROT SERPL-MCNC: 6.1 G/DL (ref 6.4–8.3)
RBC # BLD AUTO: 3.24 10E6/UL (ref 3.8–5.2)
SODIUM SERPL-SCNC: 142 MMOL/L (ref 135–145)
WBC # BLD AUTO: 8.9 10E3/UL (ref 4–11)

## 2025-04-17 PROCEDURE — 250N000012 HC RX MED GY IP 250 OP 636 PS 637

## 2025-04-17 PROCEDURE — 99233 SBSQ HOSP IP/OBS HIGH 50: CPT | Mod: 24 | Performed by: INTERNAL MEDICINE

## 2025-04-17 PROCEDURE — 80053 COMPREHEN METABOLIC PANEL: CPT

## 2025-04-17 PROCEDURE — 97535 SELF CARE MNGMENT TRAINING: CPT | Mod: GO

## 2025-04-17 PROCEDURE — 85018 HEMOGLOBIN: CPT

## 2025-04-17 PROCEDURE — 99232 SBSQ HOSP IP/OBS MODERATE 35: CPT | Mod: 24 | Performed by: NURSE PRACTITIONER

## 2025-04-17 PROCEDURE — 99232 SBSQ HOSP IP/OBS MODERATE 35: CPT | Mod: GC | Performed by: INTERNAL MEDICINE

## 2025-04-17 PROCEDURE — G0545 PR INHRENT VISIT TO INPT/OBS W CNFRM/SUSPCT INFCT DIS BY INFCT DIS SPCIALST: HCPCS | Performed by: INTERNAL MEDICINE

## 2025-04-17 PROCEDURE — 250N000011 HC RX IP 250 OP 636: Mod: JZ

## 2025-04-17 PROCEDURE — 250N000013 HC RX MED GY IP 250 OP 250 PS 637

## 2025-04-17 PROCEDURE — 120N000002 HC R&B MED SURG/OB UMMC

## 2025-04-17 PROCEDURE — 99221 1ST HOSP IP/OBS SF/LOW 40: CPT | Mod: 24 | Performed by: SURGERY

## 2025-04-17 PROCEDURE — 97530 THERAPEUTIC ACTIVITIES: CPT | Mod: GO

## 2025-04-17 PROCEDURE — 97116 GAIT TRAINING THERAPY: CPT | Mod: GP

## 2025-04-17 PROCEDURE — 36415 COLL VENOUS BLD VENIPUNCTURE: CPT

## 2025-04-17 PROCEDURE — 250N000011 HC RX IP 250 OP 636

## 2025-04-17 PROCEDURE — 97530 THERAPEUTIC ACTIVITIES: CPT | Mod: GP

## 2025-04-17 RX ADMIN — METOPROLOL SUCCINATE 25 MG: 25 TABLET, EXTENDED RELEASE ORAL at 08:18

## 2025-04-17 RX ADMIN — SIROLIMUS 1 MG: 1 TABLET, FILM COATED ORAL at 08:17

## 2025-04-17 RX ADMIN — URSODIOL 250 MG: 250 TABLET ORAL at 08:18

## 2025-04-17 RX ADMIN — EZETIMIBE 10 MG: 10 TABLET ORAL at 08:17

## 2025-04-17 RX ADMIN — INSULIN ASPART 1 UNITS: 100 INJECTION, SOLUTION INTRAVENOUS; SUBCUTANEOUS at 21:58

## 2025-04-17 RX ADMIN — APIXABAN 5 MG: 5 TABLET, FILM COATED ORAL at 08:18

## 2025-04-17 RX ADMIN — APIXABAN 5 MG: 5 TABLET, FILM COATED ORAL at 19:08

## 2025-04-17 RX ADMIN — ERTAPENEM SODIUM 1 G: 1 INJECTION, POWDER, LYOPHILIZED, FOR SOLUTION INTRAMUSCULAR; INTRAVENOUS at 22:12

## 2025-04-17 RX ADMIN — OMEGA-3-ACID ETHYL ESTERS 1 G: 1 CAPSULE, LIQUID FILLED ORAL at 08:18

## 2025-04-17 RX ADMIN — LEVOTHYROXINE SODIUM 175 MCG: 0.03 TABLET ORAL at 08:18

## 2025-04-17 RX ADMIN — GABAPENTIN 300 MG: 250 SOLUTION ORAL at 21:58

## 2025-04-17 RX ADMIN — OMEGA-3-ACID ETHYL ESTERS 1 G: 1 CAPSULE, LIQUID FILLED ORAL at 19:08

## 2025-04-17 RX ADMIN — DIPHENHYDRAMINE HYDROCHLORIDE 25 MG: 50 INJECTION, SOLUTION INTRAMUSCULAR; INTRAVENOUS at 22:08

## 2025-04-17 RX ADMIN — URSODIOL 250 MG: 250 TABLET ORAL at 19:08

## 2025-04-17 RX ADMIN — PREDNISONE 10 MG: 10 TABLET ORAL at 08:18

## 2025-04-17 ASSESSMENT — ACTIVITIES OF DAILY LIVING (ADL)
ADLS_ACUITY_SCORE: 65
ADLS_ACUITY_SCORE: 61
ADLS_ACUITY_SCORE: 65
ADLS_ACUITY_SCORE: 61
ADLS_ACUITY_SCORE: 61
ADLS_ACUITY_SCORE: 65
ADLS_ACUITY_SCORE: 61
ADLS_ACUITY_SCORE: 65
ADLS_ACUITY_SCORE: 61
ADLS_ACUITY_SCORE: 65

## 2025-04-17 NOTE — PROGRESS NOTES
"Formerly [facility] EMERGENCY DEPARTMENT  [address] [address]  Phone: [phone]    Patient:  Luz Thompson, Date of birth 1950  MRN: [id number]  4/14/2025  Date of Visit:  04/17/2025  Referring Provider Thomas Luz  Reason for consult: fever      Assessment & Plan    Recommendations:   Continue ertapenem 1 gram IV every 24 hours, see OPAT SmartPhrasliss below  Duration will be based on repeat imaging in 3-4 weeks  Please order CT A/P w/o contrast to be completed as an outpatient. I will arrange ID follow up around this time as well. ~ 5/8/25  Discussed with the patient and medicine team. Transplant ID will continue to follow.     Sindhu Del Rio D.O.   Infectious Diseases  Contact information available via [facility] Paging/Directory    Primary team:  Place imaging order(s) requested above prior to discharge.  Contact ID teams about missed ID clinic appointments and/or ongoing therapy needs.  [facility] sites only: Place order panel  OPAT Pharmacy (PANDA) Review and ID Care Transition     Prolonged Parenteral/Oral Antibiotic Recommendations and ID Follow up  This template provides final ID recommendations as of this date.     Infectious Diseases Indication: microperforation    Antibiotic Information  Name of Antibiotic Dose of Antibiotic1 Anticipated duration Effective start date2 End date   Ertapenem 1 gram IV every 24 hours 2-4 weeks depending on repeat imaging 4/16/25 Will reassess at 5/8/25 appt                 1.Dose of antibiotic will need to be renally adjusted if creatinine clearance changes  2.Effective start date is the date of adequate therapy with appropriate spectrum    Method of antibiotic delivery:PICC line.Is the line being used for another indication besides antimicrobials? Yes At the end of therapy should the line used for antimicrobials be removed or de-accessed? Yes. Selecting \"yes\" will function as written order to remove PICC line or de-access the indwelling line at the " "end of therapy.    Weekly labs required: Creatinine, AST/ALT, CBC with diff, and CRP. Dr. Del Rio will follow labs at discharge until ID follow up. Fax labs to ID clinic.    Are there pending microbial tests: No     Imaging for ID follow up: ID Imaging: Yes. Type of imaging needed Yes: CT abd/pelvis w/o contrast - the week of 5/5    We will attempt to make OPAT appointments within 2 weeks of discharge based on clinic availability.  Provider: Quang, timing of visit  5/8/25 , and the type of clinic appointment visit Okay for in person or a virtual visit.     Patients discharged to [facility] will be seen by [facility] Infectious Diseases group if ID follow up is need. [facility] ID academic group is not credentialed there.    ID provider - route this note: \"P PANDA\"    [facility] ID Clinic Information:  Phone: [phone]  Fax: [phone] (Attention ID clinic nurses)     Assessment:  74 y/o female with a history of a liver transplant (2006), recent Enterococcus faecalis bacteremia and K pneumoniae pyelonephritis s/p ertapenem (completed 3/28/25) and vancomycin (completed 3/22/25), recurrent polymicrobial ESBL UTI (dx 4/7/25) on ertapenem who presented on 4/14/25 for generalized weakness subsequently found to have possible diverticulitis and microperforation. .     Generalized weakness  Leukocytosis, improved  History of diverticulosis  Abdominal pain 2/2 to possible diverticulitis w/ a contained microperforation  Requested CT in the setting of low grade fever and abdominal pain. 4/16 CT A/P noted colonic diverticulosis, trace fluid, fat stranding, and edema around the distal sigmoid colon. Increased size of a fluid density w/ a focus of air (2.3 x 1.7 cm). This may represent diverticulitis with a contained microperforation.Of note, in 12/2024 she was found to a left colonic diverticula and small amount of fluid adjacent to the sigmoid colon possibly secondary to acute diverticulitis.  " She also has a remote history of rectal perforation w/ retroflexion on colonoscopy. CRS does not plan on surgery. Planning for medication management. I don't think this is failure of ertapenem but more related to the microperforation. For now I will plan to continue ertapenem with repeat imaging in 2-4 weeks.     ESBL polymicrobial pyelonephritis, improved  Restarted ertapenem ~ 4/8 after a 4/7 urine culture resulted with ESBL K oxytoca and ESBL Proteus vulgarius. 4/14 UA demonstrates an improved UA profile. Urine culture < 10K normal louann. 4/14 blood cultures are negative. 4/16 CT notes that she has non-specific bilateral perinephric fat stranding, left nephrolithiasis 4 mm vs vascular calcification. Remains on ertapenem. Improved.     Previous ID issues:   Enterococcus faecalis bacteremia  3/8 blood culture positive for E faecalis.  TTE was negative for endocarditis. Suspect source is from an episode of multiple loose stools and abdominal pain with transient bacterial translocation. Attempted to transition vancomycin to linezolid however had mouth numbness similar to her adverse reactions to antibiotics that makes her uncomfortable with this plan at home. Completed vancomycin on 3/22/25.      Klebsiella pneumoniae pyelonephritis  3/8 urine culture with Klebsiella pneumoniae and on the liver US 3/8, there is comment of echogenic renal parenchyma and in conjunction of symptoms of intermittent flank pain for a year and pyuria on urine studies for months, favoring treating for an extended course for pyelonephritis. Her antibiotic allergies complicate antibiotic choices here, the only oral option she has tolerated fosfomycin is resistant. Completed course with IV ertapenem due to allergies - completed 3/28/25. At the end of treatment will plan for a surveillance urinary culture 1 week after ertapenem is complete which prompted the UA on 4/7.     Hx coccidioides  Dx in winter 9009-3395, did not tolerate fluconazole due  "to severe rash. Treated with voriconazole, continued until her return to [place]. CXR with stable calcifications. Recent fungal antibody panel negative though unclear how reliable this is. Voriconazole was stopped 6/2024. Cocci antigen was negative.     Hx EBV viremia s/p rituxan 2016: No lymphadenopathy on recent abdomen/pelvis CT. Not detected 1/20/2025     #Numerous reported antimicrobial allergies  Per allergy note 8/2/2019 \"Prick and intradermal and patch testing to fluconazole, doxycycline, azithromycin, penicillins, and cephalosporins with immediate and delayed readings being negative.\" States she will never take fluconazole again. Would be candidate for oral provocation testing in future with pcn. Also notes an allergy to the  capsules, states tolerates pills or liquid formulations, sometimes needs them compounded.     Transplant check list:  - Current immunosuppression: sirolimus, prednisone  - QTc interval: 409 ms on 4/14/25  - Bacterial coverage: as above  - Pneumocystis prophylaxis: none  - Serostatus & viral prophylaxis: EBV R+, CMV R-  - Fungal prophylaxis: none, previously voriconazole  - Risk factors to suggest check of Toxoplasma, Strongy, or Schisto serology?: toxoplasma negative 1/20/25  - Immunization status: Up to date on influenza, COVID; due for Tdap  - Gamma globulin status: 1110 on 8/5/2019  - Isolation status: Good Contact for ESBL history  - Code status: Full Code    Medical Decision Making       50 MINUTES SPENT BY ME on the date of service doing chart review, history, exam, documentation & further activities per the note.    This patient visit is eligible for billing by the  code      History of Present Illness   Pertinent history obtain from: chart review and patient  Last seen yesterday.   Wbc count and fever curve improved  Reviewed CT yesterday evening and recommend CRS consult. No plans for surgery per CRS. GI also saw patient - notably she had a prior small fluid " "collection near the sigmoid in 12/2024 and had a rectal perforation in the distant past with retroflexion on colonoscopy.   Describes numbness in her mouth with ertapenem but denies swelling of her lips, cough, or SOB. She has tried taking benadryl with it today,   Continues to have some lower abdominal pain. She noted it with walking with PT.     Physical Exam     Vital signs:  /68   Pulse 63   Temp 97.8  F (36.6  C) (Oral)   Resp 18   Ht 1.702 m (5' 7\")   Wt 76.4 kg (168 lb 6.4 oz)   LMP 06/01/1988 (Approximate)   SpO2 95%   BMI 26.38 kg/m      GENERAL: chronically ill appearing, weak, laying in bed  ENT: edentulous  RESP: lungs clear to auscultation - no rales, rhonchi or wheezes, RA, no accessory muscle use  CV: regular rate and rhythm, normal S1 S2, no murmur,, trace peripheral edema  ABDOMEN: soft, not distended, no tenderness over the left abdomen and suprapubic area as there was previously, bowel sounds normal; urinary wick and depends in place  MS and SKIN: no joint swelling, bilateral erythematous skin areas over the lower calves  NEURO: generally weak, mentation intact and speech normal. Alert.     Data   Laboratory data and imaging listed below was reviewed prior to this encounter.     Microbiology:    Culture   Date Value Ref Range Status   04/14/2025 No growth after 2 days  Preliminary   04/14/2025 <10,000 CFU/mL Urogenital louann  Final   04/14/2025 No growth after 2 days  Preliminary   04/07/2025 50,000-100,000 CFU/mL Klebsiella oxytoca ESBL (A)  Final   04/07/2025 50,000-100,000 CFU/mL Proteus vulgaris ESBL (A)  Final   03/09/2025 No Growth  Final   03/09/2025 No Growth  Final   03/08/2025 No Growth  Final   03/08/2025 >100,000 CFU/mL Klebsiella pneumoniae (A)  Final   03/08/2025 Positive on the 1st day of incubation (A)  Final   03/08/2025 Enterococcus faecalis (AA)  Final     Comment:     2 of 2 bottles   02/07/2025 10,000-50,000 CFU/mL Mixture of Urogenital Louann  Final   01/22/2025 " 10,000-50,000 CFU/mL Mixture of urogenital louann  Final   01/21/2025 >100,000 CFU/mL Mixture of Urogenital Louann  Final   01/20/2025 No Growth  Final   01/20/2025 No Growth  Final   01/20/2025 No Growth  Final   01/19/2025 <10,000 CFU/mL Mixture of Urogenital Louann  Final   01/19/2025 No Growth  Final   01/19/2025 Positive on the 1st day of incubation (A)  Final   01/19/2025 Staphylococcus epidermidis (AA)  Final     Comment:     1 of 2 bottles  The recovery of this organism from a single blood culture bottle most likely represents contamination. If susceptibility testing is needed, please refer to the antibiogram or contact IDDL. If contamination is suspected, it is important to repeat two sets of blood cultures from two different sites.   01/10/2025 <10,000 CFU/mL Urogenital louann  Final   01/07/2025 No Growth  Final   01/06/2025 No Growth  Final   01/03/2025 No Growth  Final   01/03/2025 No Growth  Final   12/10/2024 No Growth  Final   12/10/2024 No Growth  Final   12/10/2024 >100,000 CFU/mL Klebsiella pneumoniae (A)  Final   11/06/2024 No Growth  Final   10/11/2024 No Growth  Final   10/11/2024 50,000-100,000 CFU/mL Mixture of Urogenital Louann  Final   09/30/2024 No Growth  Final   09/22/2024 >100,000 CFU/mL Klebsiella oxytoca ESBL (A)  Final   09/22/2024 Positive on the 1st day of incubation (A)  Final   09/22/2024 Klebsiella oxytoca ESBL (AA)  Final     Comment:     2 of 2 bottles   09/22/2024 Pseudomonas aeruginosa (AA)  Final     Comment:     1 of 2 bottles   08/20/2024 >100,000 CFU/mL Klebsiella oxytoca ESBL (A)  Final   , Inflammatory Markers:   Recent Labs   Lab Test 04/15/25  0613 08/26/19  2331 05/20/19  0957 07/30/18  0855 09/03/17  0912 09/02/17  0648 09/01/17  0832   SED 61* 78* 47* 73*  --   --   --    CRP  --  180.0* <2.9 5.2 64.0* 71.0* 51.0*   , Urine Studies:    Recent Labs   Lab Test 04/14/25  1430 04/07/25  0945 04/07/25  0935 03/08/25  1737 02/07/25  0215   LEUKEST Moderate* Large* Large*  Large* Small*   WBCU 85* >182* >100* >182* 10-25*   , Hematology Studies:    Recent Labs   Lab Test 04/17/25  0838 04/16/25  0611 04/15/25  0613 04/14/25  1410 04/07/25  0945 04/04/25  1159 01/04/25  0742 01/03/25  1733 10/11/24  2212 09/30/24  1545 08/27/19  0532 08/26/19  2331 05/19/18  0629 05/18/18  0731 09/03/17  0912 09/02/17  0648 09/01/17  0832   WBC 8.9 11.5* 15.1* 14.8* 6.5 9.8   < > 9.2   < > 8.2   < > 12.5*   < > 8.0   < > 10.2 9.4   ANEU  --   --   --   --   --   --   --  6.9  --  3.7  --  10.8*  --  5.0  --  7.4 6.5   AEOS  --   --   --   --   --   --   --  0.0  --  0.2  --  0.0  --  0.1  --  0.2 0.1   HGB 8.8* 8.8* 9.2* 9.9* 9.1* 10.8*   < > 10.2*   < > 11.1*   < > 10.5*   < > 11.6*   < > 10.5* 11.1*   MCV 92 90 90 88 88 86   < > 89   < > 93   < > 86   < > 86   < > 89 87    312 331 369 351 376   < > 418   < > 388   < > 226   < > 324   < > 259 269    < > = values in this interval not displayed.    , Metabolic Studies:   Recent Labs   Lab Test 04/17/25  0838 04/16/25  0611 04/15/25  0613 04/14/25  1410 04/07/25  0945    140 141 141 139   POTASSIUM 4.3 3.8 3.4 3.4 4.0   CHLORIDE 109* 108* 107 107 105   CO2 24 22 23 22 21*   BUN 37.3* 36.2* 26.6* 25.9* 33.1*   CR 1.65* 1.68* 1.69* 1.42* 1.83*   GFRESTIMATED 32* 31* 31* 38* 28*   , and Hepatic Studies:   Recent Labs   Lab Test 04/17/25  0838 04/16/25  0611 04/15/25  0613 04/14/25  1410 04/07/25  0945 04/04/25  1159 03/10/25  0614   BILITOTAL <0.2 <0.2 0.3 0.3  --  0.4 0.2   ALKPHOS 87 86 74 84  --  137 97   ALBUMIN 3.2* 2.9* 2.8* 3.3* 3.5 3.6 3.0*   AST 23 17 17 16  --  104* 37   ALT 23 15 12 12 40 88* 87*

## 2025-04-17 NOTE — PROGRESS NOTES
Care Management Follow Up    Length of Stay (days): 3    Expected Discharge Date: 04/19/2025     Concerns to be Addressed: discharge planning     Patient plan of care discussed at interdisciplinary rounds: Yes    Anticipated Discharge Disposition: Home, Home Care ([facility] for home care)              Anticipated Discharge Services: None  Anticipated Discharge DME: None    Patient/family educated on Medicare website which has current facility and service quality ratings: no  Education Provided on the Discharge Plan:    Patient/Family in Agreement with the Plan: yes    Referrals Placed by CM/SW: Homecare, Internal Clinic Care Coordination  Private pay costs discussed: Not applicable    Discussed  Partnership in Safe Discharge Planning  document with patient/family: No     Handoff Completed: No, handoff not indicated or clinically appropriate    Additional Information:  Patient with a medical history significant for liver transplant 2002, paroxysmal atrial fibrillation on apixaban, HTN, T2DM, CKD, HF, hx of CVA, hypothyroidism, recurrent ESBL UTIs with recent E. Faecalis bacteremia.  Readmitted with c/o weakness likely d/t infection.  Pt recently discharged home with home infusion and home care services on 3/14/2025.  PT/OT consulted with dual rec's home with home care vs TCU pending LOS.  Final antibiotic plan pending ID.    Met with pt.  Per discussion with pt she was open to [facility] for home IV antibiotics and receiving nursing services through [facility].  Reviewed TCU vs possibly home with home care services pending medical clearance, and final therapy recommendations.  Pt notes she has been in and out of the hospital a couple of times since January.  Pt would be open to going to a TCU if that is the recommendation.  Pt preferred facility/choice would be [facility] in [place].  Agreed to provide pt with a list of TCU's from Medicare.gov with star ratings.  Pt aware that SW and RNCC will  follow and be assisting with discharge planning.  Discussed discharge transportation and possible OP cost should pt require w/c transport at discharge.  Per pt her children should be able to transport when medically cleared for discharge. Messaged Medicine team inquiring about [facility].  Messaged Giancarlo 7B SW with update.   Email update sent to [facility].       1500: Met with pt and her son Luciano.  Reviewed therapy recommendations.  Pt and her son indicated that TCU would be the preferred plan as pt and her son acknowledge pt has grown weaker over the past few months.  Pt is ok with care team updating her son Luciano on pt plan of care, discharge plan. Pt would prefer to not have [place] her daughter updated, pt or her son will update her daughter.   Provided pt with Medicare.gov list of star rated TCU's.   Pt and son note no other questions or concerns.  Messaged Jh 3 with update and 7B SW.      [id number] Received VM from [facility] Liaison.  If patient were to discharge over the weekend [facility] would need verbal orders called in by 2 p.m. tomorrow, [phone].        Home Resources    Home Care  [facility]  [phone]  RN    Home Infusion  [facility]  [facility] RN Liaison   [phone]  Office [phone]  IV antibiotics, infusion supplies.     Next Steps:   Follow for discharge planning  Final antibiotic plan pending ID rec's  Home with home care/home infusion services vs TCU.    Hina Mclaughlin, RN BSN, PHN, ACM-RN  April 17, 2025  7A Nurse Coordinator    Phone: [phone]  Available on [place] 7A SOT RNCC  Weekend/Holiday 7A SOT RNCC    4/17/2025

## 2025-04-17 NOTE — PROGRESS NOTES
[facility]    Medicine Progress Note - Medicine Service, MAREFRAIN TEAM 3    Date of Admission:  4/14/2025    Assessment & Plan      76 yo w/ PMHx of primary biliary cirrhosis s/p liver transplant in 2002, paroxysmal atrial fibrillation on apixaban, HTN, T2DM, CKD, HF, hx of CVA, hypothyroidism, recurrent ESBL UTIs with recent E. Faecalis bacteremia admission last month on IV ertapenem via PICC admitted for generalized weakness likely 2/2 infection    Today:  Continue Ertapenem 1 q24hr  PT/OT recommending TCU, patient is agreeable & SW is aware  CRS consulted for contained bowel perforation; medical management --> clear liquid diet and monitor  ordered CT A/P for outpatient in 3 to 4 weeks     #generalized weakness likely 2/2 bacteremia  #recurrent UTIs on IV ertapenem since 3/9/2024  #recent E. Faecalis bacteremia  #Sigmoid microperforation  Afebrile and hemodynamically stable with WBC 14.8 on admit though with reported fevers and lethargy. Given UA improved from prior with some leukocyte esterases, consideration for bacteremia 2/2 PICC line over recurrent UTI/pyelonephritis. Reports suprapubic tenderness. CXR w/ mild pulmonary edema. Recent admission in March 2025 for Klebsiella pneumonia UTI and E. Faecalis bacteremia with recommendations from ID for ertapenem 1g q24h until EOT 4/22. RVP negative. Per pt missed ertapenem from 4/8-4/13. Microperforation noted on CTAP. CRS consulted.  Abx:   vancomycin: 4/14 - 4/16  ertapenem: 3/9 - 4/8, 4/14 - **  meropenem: 3/8 - 3/9  Cultures:  PICC line blood cultures, pending  Urine culture, <10 000 CFU Urogenital louann  Transplant ID consulted, appreciate recs  Continue ertapenem  CRS consult, appreciate recs  Clear liquid diet  ctm  PT/OT recommending TCU, patient is agreeable & SW is aware    #Liver transplant in 2002 2/2 primary biliary cirrhosis  Follows Dr. Ramirez. Has missed 2 days of medication.  PTA sirolimus 1 mg qD  PTA  prednisone 10 mg qD  PTA ursodiol 250 mg BID  GI/Liver transplant consult, appreciate recs    #CKD3b w/ moderate proteinuria, at baseline  #HTN  #?heart failure  Baseline Cr. 1.5. History of dialysis at time of liver transplant 23 years ago. Recurrent AKIs and immunosuppressive medication toxicity. Not on SGLT2i 2/2 recurrent UTI. Chart review w/ unclear heart failure history, unconfirmed with EF 60-65% on 3/10/25. Encourage oral intake for now. BNP 1446 elevated from 1 month ago. Given unclear HF history, trace bilateral LE edema, with some pulm edema noted on imaging, consideration for volume overload. However, without orthopnea or clear cough. Will defer diuresis.  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 Wound does not look infected.  CTM    #anemia, chronic  Hgb 9.9 on admit. Stable.  HOLD PTA ferrous sulfate    #hypothyroidism  PTA levothyroxine 175mcg qD          Diet: Snacks/Supplements Adult: Ensure Enlive; With Meals  Snacks/Supplements Adult: Boost Soothe; With Meals  Moderate Consistent Carb (60 g CHO per Meal) Diet    DVT Prophylaxis: DOAC  Ron Catheter: Not present  Lines: None     Cardiac Monitoring: None  Code Status: Full Code      Clinically Significant Risk Factors          # Hyperchloremia: Highest Cl = 108 mmol/L in last 2 days, will monitor as appropriate          # Hypoalbuminemia: Lowest albumin = 2.8 g/dL at 4/15/2025  6:13 AM, will monitor as appropriate       # Hypertension: Noted on problem list           # DMII: A1C = 6.8 % (Ref range: <5.7 %)  "within past 6 months, PRESENT ON ADMISSION  # Overweight: Estimated body mass index is 26.38 kg/m  as calculated from the following:    Height as of this encounter: 1.702 m (5' 7\").    Weight as of this encounter: 76.4 kg (168 lb 6.4 oz)., PRESENT ON ADMISSION       # Financial/Environmental Concerns: none         Social Drivers of Health    Housing Stability: Low Risk  (4/15/2025)    Housing Stability     Do you have housing? : Yes     Are you worried about losing your housing?: No   Recent Concern: Housing Stability - High Risk (1/22/2025)    Housing Stability     Do you have housing? : No     Are you worried about losing your housing?: No   Tobacco Use: Medium Risk (4/8/2025)    Patient History     Smoking Tobacco Use: Former     Smokeless Tobacco Use: Never   Transportation Needs: Low Risk  (4/15/2025)    Transportation Needs     Within the past 12 months, has lack of transportation kept you from medical appointments, getting your medicines, non-medical meetings or appointments, work, or from getting things that you need?: No   Recent Concern: Transportation Needs - High Risk (1/27/2025)    Transportation Needs     Within the past 12 months, has lack of transportation kept you from medical appointments, getting your medicines, non-medical meetings or appointments, work, or from getting things that you need?: Yes   Physical Activity: Insufficiently Active (11/9/2023)    Received from [facility]    Exercise Vital Sign     Days of Exercise per Week: 5 days     Minutes of Exercise per Session: 10 min          Disposition Plan     Medically Ready for Discharge: Anticipated in 2-4 Days       Staffed with Dr. Luz.    Gentry Umanzor MD  Medicine Service, [facility] TEAM 3  [facility]  Securely message with u[employer] (more info)  Text page via [facility] Paging/Directory   See signed in provider for up to date coverage " information  ______________________________________________________________________    Interval History   Micorperf found on CTAP as noted above overnight. ID with recs to consult CRS in am.    Otherwise doing ok, small about of R sided abd pain in am, otherwise no SOB, fevers, chills. No Bms today. Tolerating food so far.    Physical Exam   Vital Signs: Temp: 97.7  F (36.5  C) Temp src: Axillary BP: 130/52 Pulse: 63   Resp: 16 SpO2: 100 % O2 Device: Nasal cannula Oxygen Delivery: 1 LPM  Weight: 168 lbs 6.4 oz    Constitutional: awake, alert, NAD, eating breakfast  Eyes: Lids and lashes normal  HENT: NCAT, healing mohs surgery on L cheek  Resp: No increased WOB, CTAB, no w/r/rh  Card: RRR, normal S1 and S2,  no m/r/g  Extrem: Pulses equal throughout, no LE edema  GI: BS normal, soft, non-distended, mild tenderness over LLQ, no masses  Skin/integ: no erythema or rash, no jaundice  Neuro: cranial nerves II-XII are grossly intact, uses hearing aids  Neuropsych: alert, answering questions appropriately, interactive, affect full      Medical Decision Making             Data         Imaging results reviewed over the past 24 hrs:   Recent Results (from the past 24 hours)   CT Abdomen Pelvis w/o Contrast    Narrative    EXAM: CT abdomen and pelvis without intravenous contrast. 4/16/2025  12:12 PM    HISTORY: suprapubic pain with recurrent utis       TECHNIQUE: Helical acquisition of image data was performed for the  abdomen and pelvis without intravenous contrast. Multiplanar  reconstructions were performed and reviewed.    CONTRAST: None.     COMPARISON: CT abdomen/pelvis 3/8/2025, CT chest, abdomen and pelvis  1/22/2025    FINDINGS:    Lower thorax: Small bilateral pleural effusions with adjacent  atelectasis. Similar degree of right chronic calcified opacities in  the right greater than left lower lobe. Interlobular septal thickening  at the bases. Reticular opacities peripherally in the lingula and  right middle lobe.  Aortic and mitral annuli calcifications.  Multivessel coronary artery atherosclerotic calcifications.     Liver: Postsurgical changes of partial liver transplantation.  Unchanged curvilinear hypodensity in the inferior right lobe.    Gallbladder/biliary tree: The common bile duct is not dilated. Status  post cholecystectomy.     Pancreas: Diffuse fatty atrophic changes without ductal dilatation.  Stable 1.2 cm likely cyst of the pancreatic head.    Spleen: The spleen is not enlarged.    Adrenal glands: No adrenal nodules.    Kidneys/ureters: Nonspecific bilateral perinephric fat stranding.  Atrophic changes bilaterally. Left nephrolithiasis measuring up to 4  mm, versus vascular calcification bilaterally. No hydronephrosis.  Unchanged indeterminate density exophytic left-sided renal cysts.    Stomach: Unremarkable.    Bladder/pelvic organs: Distended bladder within normal limits.    Bowel/mesentery: No dilated loops of small bowel or colon. The  appendix is surgically absent. Colonic diverticulosis. There is trace  free fluid about the sigmoid colon and mild presacral edema. Increased  size of a soft tissue/fluid density posterior to the sigmoid colon  containing a focus of air (5/343), measuring 2.3 x 1.7 cm, previously  1.6 x 1.0 cm which previously demonstrated a focus of air.     Peritoneum/retroperitoneum: No extraluminal bowel gas. No free fluid  in the abdomen or pelvis.    Lymph nodes: No lymphadenopathy.    Major vessels: No aneurysmal dilatation. Scattered atherosclerotic  calcifications.    Soft tissues: No acute soft tissue abnormality.    Osseous structures: Degenerative changes of the spine. No acute or  suspicious osseous abnormality.      Impression    IMPRESSION:  1.  New fat stranding and mesenteric edema about the distal sigmoid  colon in the midline, nonspecific, but may represent diverticulitis.  Suspected contained microperforation with small fluid collection  containing tiny focus of air  posterior to the sigmoid colon.  2.  Distended bladder within normal limits.  3.  Unchanged fluid density 1.2 cm lesion in the region of the  pancreatic head.  4.  Stable postsurgical changes of liver transplant and curvilinear  hypodensity in the inferior right lobe.  5.  Other chronic CT findings including small bilateral pleural  effusions and right greater than left basilar calcified opacities are  unchanged.    I have personally reviewed the examination and initial interpretation  and I agree with the findings.    HERI HELTON MD         SYSTEM ID:  [id number]

## 2025-04-17 NOTE — PLAN OF CARE
"Goal Outcome Evaluation:      Plan of Care Reviewed With: patient    Overall Patient Progress: no changeOverall Patient Progress: no change    Outcome Evaluation: hypotensive on 1.5 LPM NC, moderate pain, denies nausea. Needs encouragement to increase PO fluid intake    /56 (BP Location: Right arm)   Pulse 80   Temp 98.7  F (37.1  C) (Oral)   Resp 16   Ht 1.702 m (5' 7\")   Wt 76.4 kg (168 lb 6.4 oz)   LMP 06/01/1988 (Approximate)   SpO2 98%   BMI 26.38 kg/m      Shift: [id number]  Isolation Status: Contact for ESBL, enteric for R/O c-diff   VS: stable on 1.5 LPM NC, afebrile  Neuro: Aox4  Behaviors: calm, cooperative  BG: ACHS  Labs: Hgb=8.8 (trending down - team aware)  Respiratory: WDLx on 1.5 LPM  Cardiac: WDL  Pain/Nausea: moderate pain, denies nausea  PRN: tylenol x1  Diet: Carb consistent   IV Access: Right midline  Infusion(s): None  Lines/Drains: Midline, Purewick overnight  GI/: BM 4/15, oliguric   Skin: redness on coccyx   Mobility: Ax1 + GB + walker  Events/Education: CT scan completed today  Plan: Continue POC     "

## 2025-04-17 NOTE — PLAN OF CARE
"Goal Outcome Evaluation:      Plan of Care Reviewed With: patient    Overall Patient Progress: no changeOverall Patient Progress: no change    Outcome Evaluation: VSS on 1L O2 NC, pain controlled with scheduled meds, denies nausea    /56 (BP Location: Right arm)   Pulse 78   Temp 98.6  F (37  C) (Oral)   Resp 16   Ht 1.702 m (5' 7\")   Wt 76.4 kg (168 lb 6.4 oz)   LMP 06/01/1988 (Approximate)   SpO2 98%   BMI 26.38 kg/m      Shift: [id number]  Isolation Status: contact, enteric for R/O C-diff  VS: VSS on 1L O2, afebrile  Neuro: Aox4  Behaviors: calm, cooperative  BG: ACHS, 2am 234,168  Respiratory: WDL, on 1L O2 NC  Cardiac: WDL  Pain/Nausea: pain controlled with scheduled meds, denies nausea  Diet: Carb consistent, enc'd to drink fluids  IV Access: Right midline  Infusion(s): IV abx  GI/: Primafit overnight, LBM 4/15  Skin: Redness on coccyc, mepilex in place, wound on L cheek  Mobility: Assist x 1, GB  Events/Education: Awaiting BM to send stool sample  Plan: continue plan of care    "

## 2025-04-17 NOTE — PLAN OF CARE
"Goal Outcome Evaluation:      Plan of Care Reviewed With: patient    Overall Patient Progress: no changeOverall Patient Progress: no change    Outcome Evaluation: VSS on RA, pain controlled w scheduled meds, denies nausea, clear liquid diet initiated, tele orders started.    /47   Pulse 63   Temp 98.6  F (37  C) (Oral)   Resp 18   Ht 1.702 m (5' 7\")   Wt 76.4 kg (168 lb 6.4 oz)   LMP 06/01/1988 (Approximate)   SpO2 97%   BMI 26.38 kg/m      Shift: [id number]  Isolation Status: contact, enteric rule out  VS: stable on RA, afebrile  Neuro: Aox4  Behaviors: calm, cooperative  BG: ACHS; 118. 163  Respiratory: WDL; (1L O2 needed at night)   Cardiac: WDL; bradycardic between 40-60s. Provider aware. Tele initiated  Pain/Nausea: pain controlled with scheduled meds, pt. denies nausea.   PRN: none  Diet: clear liquids  IV Access: R midline  GI/: oliguric, LBM 4/15  Skin: redness on coccyx, mepilex in place, wound on L cheek  Mobility: Assist x1, gait belt  Events/Education: awaiting BM to send stool sample, diverticulitis found, contained bowel perforation- provider initiated clear liquid diet   Plan: continue to monitor      Hard of hearing, even with hearing aids. Speak clearly  "

## 2025-04-17 NOTE — CONSULTS
Colon and Rectal Surgery Consultation Note  [facility]    Luz Thompson MRN# [id number]   Age: 75 year old YOB: 1950     Date of Admission:  4/14/2025    Reason for consult: Acute Diverticulitis with possible microperforation       Requesting physician: Gentry Umanzor MD       Level of consult: Consult and follow for daily recommendations           Assessment:   74 yo w/ PMHx of primary biliary cirrhosis s/p liver transplant in 2002, paroxysmal atrial fibrillation on apixaban, HTN, T2DM, CKD, HF, hx of CVA, hypothyroidism, recurrent ESBL UTIs with recent E. Faecalis bacteremia admission last month on IV ertapenem via PICC admitted for generalized weakness likely 2/2 infection who we are consulted for acute diverticulitis. Based on imaging and clinical findings, this is an episode of uncomplicated diverticulitis with possible contained microperforation. She is already on broad spectrum IV antibiotics. Conservative management if appropriate for patient given she is clinically doing well- normotensive, no tachycardia, decreasing leukocytosis, afebrile. She should continue on antibiotics and have bowel rest with CLD. Her pain is minimal and not really present at all on examination. CRS will follow with abdominal examinations and to assess clinical improvement.          Recommendations:   -Bowel rest, clear liquid diet okay unless patient has worsening symptoms  -Continue IV antibiotics          History of Present Illness:   CC: Diverticulitis, uncomplicated    History is obtained from the patient    74 yo w/ PMHx of primary biliary cirrhosis s/p liver transplant in 2002, paroxysmal atrial fibrillation on apixaban, HTN, T2DM, CKD, HF, hx of CVA, hypothyroidism, recurrent ESBL UTIs with recent E. Faecalis bacteremia admission last month on IV ertapenem via PICC admitted for generalized weakness likely 2/2 infection. She was having nonspecific abdominal pain this AM prompting team to  "get a CT scan. Scan showed  new fat stranding and mesenteric edema about the distal sigmoid  colon in the midline, that may represent diverticulitis with suspected contained microperforation with small fluid collection containing tiny focus of air posterior to the sigmoid colon. Prior to admission on the 14th, she had been having nonspecific abdominal pain and loose stools. She does endorse a history of diverticulosis but has never had a flare or diverticulitis. Her last colonoscopy was in 2017 and she unfortunately had an iatrogenic perforation so does not plan to get another. That colonoscopy showed diverticulosis of the sigmoid colon.          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             Past Surgical History:     Past Surgical History:   Procedure Laterality Date    APPENDECTOMY  1961    CATARACT IOL, RT/LT      RE12/19/2013, LE12/10/2013 - Toric lenses    CHOLECYSTECTOMY  1991    COLONOSCOPY  03/10/2014    Procedure: COLONOSCOPY;;  Surgeon: Gentry Ramirez MD;  Location: [facility] GI    CYSTOSCOPY      ear drum repair      ENDOBRONCHIAL ULTRASOUND FLEXIBLE N/A 09/29/2017    Procedure: ENDOBRONCHIAL ULTRASOUND FLEXIBLE;  Flexible Bronchoscopy, Endobronchial Ultrasound, Transbronchial Needle Aspiration ;  Surgeon: Eden Clinton MD;  Location: [facility] OR    ENDOSCOPIC RETROGRADE CHOLANGIOPANCREATOGRAM  09/19/2013    Procedure: ENDOSCOPIC RETROGRADE CHOLANGIOPANCREATOGRAM;  Endoscopic Retrograde Cholangiopancreatogram with single balloon enteroscopy, ballon sweep of bile duct;  Surgeon: Brett Membreno MD;  Location: [facility] OR    HC KNEE SCOPE,MED/LAT MENISECTOMY Right 08/10/2012    partial medial menisectomy only    KNEE SURGERY  1966    R knee    PICC INSERTION  09/18/2013    4fr SL PASV PICC, 40cm (1cm external) in the R basilic vein w/ tip in the low SVC    PICC INSERTION  02/21/2014    5 fr DL BioFlo Navilyst PICC, 46 cm (3 cm external) in the L basilic vein w/ tip in the SVC RA junction.    THYROIDECTOMY  03/2010    TRANSPLANT LIVER RECIPIENT LIVING UNRELATED  05/2002             Social History:     Social History     Socioeconomic History    Marital status:      Spouse name: Robbin Thompson    Number of children: 4    Years of education: 20    Highest education level: Not on file   Occupational History    Occupation: Guardian Conservator      Employer: [employer]     Comment: social work     Employer: [employer]   Tobacco Use    Smoking status: Former     Current packs/day: 0.00     Average packs/day: 1 pack/day for 18.0 years (18.0 ttl pk-yrs)     Types: Cigarettes     Start date:  "1967     Quit date: 1985     Years since quittin.0    Smokeless tobacco: Never   Vaping Use    Vaping status: Never Used   Substance and Sexual Activity    Alcohol use: Yes     Alcohol/week: 0.0 standard drinks of alcohol     Comment: rare - \"I toast at weddings\"    Drug use: No    Sexual activity: Yes     Partners: Male     Birth control/protection: Post-menopausal   Other Topics Concern    Parent/sibling w/ CABG, MI or angioplasty before 65F 55M? Not Asked     Service Not Asked    Blood Transfusions Not Asked    Caffeine Concern Not Asked    Occupational Exposure Not Asked    Hobby Hazards Not Asked    Sleep Concern Not Asked    Stress Concern Not Asked    Weight Concern Not Asked    Special Diet Not Asked    Back Care Not Asked    Exercise Yes     Comment: cardio and strengthing    Bike Helmet Not Asked    Seat Belt Not Asked    Self-Exams Not Asked   Social History Narrative    She is retired. She [place] the [facility] [place] the [place] of the [place]. [place] h[place], [place], corn and soybean crops.     Social Drivers of Health     Financial Resource Strain: Low Risk  (4/15/2025)    Financial Resource Strain     Within the past 12 months, have you or your family members you live with been unable to get utilities (heat, electricity) when it was really needed?: No   Food Insecurity: Low Risk  (4/15/2025)    Food Insecurity     Within the past 12 months, did you worry that your food would run out before you got money to buy more?: No     Within the past 12 months, did the food you bought just not last and you didn t have money to get more?: No   Transportation Needs: Low Risk  (4/15/2025)    Transportation Needs     Within the past 12 months, has lack of transportation kept you from medical appointments, getting your medicines, non-medical meetings or appointments, work, or from getting things that you need?: No   Recent Concern: " Transportation Needs - High Risk (2025)    Transportation Needs     Within the past 12 months, has lack of transportation kept you from medical appointments, getting your medicines, non-medical meetings or appointments, work, or from getting things that you need?: Yes   Physical Activity: Insufficiently Active (2023)    Received from [facility] [place]    Exercise Vital Sign     Days of Exercise per Week: 5 days     Minutes of Exercise per Session: 10 min   Stress: No Stress Concern Present (2023)    Received from [facility] [place]    [employer] - Occupational Stress Questionnaire     Feeling of Stress : Only a little   Social Connections: Feeling Socially Integrated (2023)    Received from [facility] [place]    OASIS : Social Isolation     Frequency of experiencing loneliness or isolation: Never   Interpersonal Safety: Low Risk  (4/15/2025)    Interpersonal Safety     Do you feel physically and emotionally safe where you currently live?: Yes     Within the past 12 months, have you been hit, slapped, kicked or otherwise physically hurt by someone?: No     Within the past 12 months, have you been humiliated or emotionally abused in other ways by your partner or ex-partner?: No   Housing Stability: Low Risk  (4/15/2025)    Housing Stability     Do you have housing? : Yes     Are you worried about losing your housing?: No   Recent Concern: Housing Stability - High Risk (2025)    Housing Stability     Do you have housing? : No     Are you worried about losing your housing?: No             Family History:     Family History   Problem Relation Age of Onset    Hypertension Mother     Endometrial Cancer Mother     Hyperlipidemia Mother     Prostate Cancer Father     Macular Degeneration Father     Cancer - colorectal Maternal Grandmother         in her 80's, has surgery and removal of part of kidney,  at age 98    Heart Disease Maternal Grandfather          at 98    Glaucoma  Maternal Grandfather     Cerebrovascular Disease Paternal Grandmother         in her 80's    Hypertension Paternal Grandmother     Heart Disease Paternal Grandfather         MI    Alzheimer Disease Paternal Grandfather     Allergies Son     Neurologic Disorder Daughter         Migraines    Breast Cancer Other     Anesthesia Reaction No family hx of     Crohn's Disease No family hx of     Ulcerative Colitis No family hx of     Melanoma No family hx of     Skin Cancer No family hx of        Last colonoscopy 5 years ago          Allergies:      Allergies   Allergen Reactions    Fluconazole Hives and Itching     Full body hives  **Intradermal skin testing negative**  [See intradermal skin testing results from 8/2/2019]    Mycophenolate Diarrhea and Nausea and Vomiting     Patient stated it was chronic and lasted months      Penicillins Anaphylaxis, Hives, Itching and Rash     **Intradermal skin testing negative**  [See intradermal skin testing results from 8/2/2019]      Simvastatin Muscle Pain (Myalgia)     severe  Other reaction(s): Myalgia caused by statin    Methotrexate Other (See Comments)     Other reaction(s): Sore  Sores in mouth, esophagus, and stomach.       Morphine And Codeine Itching and Other (See Comments)     Psych disturbance  Other reaction(s): Confusion, Mood alteration    Quinolones Anxiety, Dizziness, Headache, Other (See Comments), Palpitations and Unknown     Other reaction(s): Hyperactive behavior, Lightheadedness, Mood alteration    Dizzy, light headed    Dizziness, shaky, and jumpy    Capsules, Empty Gelatin [Gelatin]     Carvedilol Diarrhea     Per pt - severe diarrhea and LE swelling  + tolerating Toprol    Lansoprazole Diarrhea    Azithromycin Itching     [See intradermal skin testing results from 8/2/2019]    Bactrim [Sulfamethoxazole-Trimethoprim] Other (See Comments)     Numb mouth, tingling lips (treated with anti-histamines)    Cephalosporins Itching     [See intradermal skin testing  results from 8/2/2019]    Ciprofloxacin Hcl Other (See Comments) and Dizziness     Insomnia, mood lability, Irregular heart beat         Doxycycline Itching and Unknown     [See intradermal skin testing results from 8/2/2019]    Lisinopril Cough    Omeprazole Itching    Tolectin [Nsaids] Rash    Tolmetin Rash and Itching    Tramadol Rash, Hives and Itching             Medications:     Current Facility-Administered Medications   Medication Dose Route Frequency Provider Last Rate Last Admin    acetaminophen (TYLENOL) tablet 650 mg  650 mg Oral Q6H PRN Surekha Moncada MD   650 mg at 04/16/25 1414    [Held by provider] amLODIPine (NORVASC) tablet 2.5 mg  2.5 mg Oral Daily Surekha Moncada MD        apixaban ANTICOAGULANT (ELIQUIS) tablet 5 mg  5 mg Oral BID Surekha Moncada MD   5 mg at 04/17/25 0818    benzocaine-menthol (CHLORASEPTIC MAX) 15-10 MG lozenge 1 lozenge  1 lozenge Buccal Q1H PRN Surekha Moncada MD        glucose gel 15-30 g  15-30 g Oral Q15 Min PRN Eben Kennedy MD        Or    dextrose 50 % injection 25-50 mL  25-50 mL Intravenous Q15 Min PRN Eben Kennedy MD        Or    glucagon injection 1 mg  1 mg Subcutaneous Q15 Min PRN Eben Kennedy MD        diphenhydrAMINE (BENADRYL) capsule 25 mg  25 mg Oral Q6H PRN Eben Kennedy MD        Or    diphenhydrAMINE (BENADRYL) injection 25 mg  25 mg Intravenous Q6H PRN Eben Kennedy MD   25 mg at 04/16/25 2239    ertapenem (INVanz) 1 g vial to attach to  mL bag  1 g Intravenous Q24H Surekha Moncada MD 0 mL/hr at 04/15/25 2317 1 g at 04/16/25 2241    [Held by provider] evolocumab (REPATHA) injection 140 mg  140 mg Subcutaneous Q14 Days Surekha Moncada MD        ezetimibe (ZETIA) tablet 10 mg  10 mg Oral Daily Surekha Moncada MD   10 mg at 04/17/25 0817    [Held by provider] ferrous sulfate (FEROSUL) tablet 325 mg  325 mg Oral BID PRN Surekha Moncada MD        gabapentin (NEURONTIN) solution 300 mg  300 mg Oral At Bedtime Surekha Moncada MD   300 mg at 04/16/25 9344    [Held by provider]  hydrALAZINE (APRESOLINE) tablet 50 mg  50 mg Oral TID Surekha Moncada MD        insulin aspart (NovoLOG) injection (RAPID ACTING)  1-3 Units Subcutaneous TID AC Eben Kennedy MD   1 Units at 04/16/25 1807    insulin aspart (NovoLOG) injection (RAPID ACTING)  1-3 Units Subcutaneous At Bedtime Eben Kennedy MD   1 Units at 04/16/25 2241    levothyroxine (SYNTHROID/LEVOTHROID) tablet 175 mcg  175 mcg Oral Daily Surekha Moncada MD   175 mcg at 04/17/25 0818    lidocaine (LMX4) cream   Topical Q1H PRN Surekha Moncada MD        lidocaine 1 % 0.1-1 mL  0.1-1 mL Other Q1H PRN Surekha Moncada MD        [Held by provider] linagliptin (TRADJENTA) tablet 5 mg  5 mg Oral Daily Surekha Moncada MD        loperamide (IMODIUM) capsule 2 mg  2 mg Oral 4x Daily PRN Dianne Pederson MD        melatonin tablet 1 mg  1 mg Oral At Bedtime PRN Surekha Moncada MD        [Held by provider] metFORMIN (GLUCOPHAGE XR) 24 hr tablet 500 mg  500 mg Oral Daily with supper Surekha Moncada MD        metoprolol succinate ER (TOPROL XL) 24 hr tablet 25 mg  25 mg Oral Daily Surekha Moncada MD   25 mg at 04/17/25 0818    omega-3 acid ethyl esters (LOVAZA) capsule 1 g  1 g Oral BID Dory Miller MD   1 g at 04/17/25 0818    ondansetron (ZOFRAN ODT) ODT tab 4 mg  4 mg Oral Q6H PRN Surekha Moncada MD        Or    ondansetron (ZOFRAN) injection 4 mg  4 mg Intravenous Q6H PRN Surekha Moncada MD        oxyCODONE (ROXICODONE) tablet 5 mg  5 mg Oral Q6H PRN Surekha Moncada MD        Patient is already receiving anticoagulation with heparin, enoxaparin (LOVENOX), warfarin (COUMADIN)  or other anticoagulant medication   Does not apply Continuous PRN Surekha Moncada MD        polyethylene glycol (MIRALAX) Packet 17 g  17 g Oral Daily Surekha Moncada MD   17 g at 04/16/25 0940    [START ON 4/18/2025] predniSONE (DELTASONE) half-tab 9 mg  9 mg Oral Daily Luzma Osman APRN CNP        senna-docusate (SENOKOT-S/PERICOLACE) 8.6-50 MG per tablet 1 tablet  1 tablet Oral BID PRN Surekha Moncada MD        Or  "   senna-docusate (SENOKOT-S/PERICOLACE) 8.6-50 MG per tablet 2 tablet  2 tablet Oral BID PRN Surekha Moncada MD        sirolimus (GENERIC EQUIVALENT) tablet 1 mg  1 mg Oral Daily Surekha Moncada MD   1 mg at 04/17/25 0817    sodium chloride (PF) 0.9% PF flush 3 mL  3 mL Intracatheter Q8H LUZMARIA Surekha Moncada MD   3 mL at 04/16/25 2240    sodium chloride (PF) 0.9% PF flush 3 mL  3 mL Intracatheter q1 min prn Surekha Moncada MD   3 mL at 04/15/25 0831    ursodiol (ACTIGALL) tablet 250 mg  250 mg Oral BID Surekha Moncada MD   250 mg at 04/17/25 0818             Review of Systems:      All other review of systems negative, except for what is mentioned above        Physical Exam:   /47   Pulse 63   Temp 98.6  F (37  C) (Oral)   Resp 18   Ht 1.702 m (5' 7\")   Wt 76.4 kg (168 lb 6.4 oz)   LMP 06/01/1988 (Approximate)   SpO2 97%   BMI 26.38 kg/m    General: Alert, interactive, NAD  Resp: CTAB  Cardiac: RRR  Abdomen: Soft, nontender, nondistended. No focal tenderness in quadrant where previously endorsed pain (RLQ)  Skin: Warm and dry, no jaundice or rash  Neuro: A&Ox3          Data:     Lab Results   Component Value Date    WBC 8.9 04/17/2025    HGB 8.8 (L) 04/17/2025    HCT 29.7 (L) 04/17/2025     04/17/2025     04/17/2025    POTASSIUM 4.3 04/17/2025    CHLORIDE 109 (H) 04/17/2025    CO2 24 04/17/2025    BUN 37.3 (H) 04/17/2025    CR 1.65 (H) 04/17/2025     (H) 04/17/2025    SED 61 (H) 04/15/2025    NTBNPI 1,446 04/14/2025    TROPONIN 0.03 09/14/2013    TROPI <0.015 05/18/2018    AST 23 04/17/2025    ALT 23 04/17/2025    GGT 95 (H) 11/11/2008    ALKPHOS 87 04/17/2025    BILITOTAL <0.2 04/17/2025    TIFFANY 20 01/03/2025    INR 1.23 (H) 01/03/2025     Imaging:  CT Abdomen Pelvis w/o Contrast    Result Date: 4/16/2025  EXAM: CT abdomen and pelvis without intravenous contrast. 4/16/2025 12:12 PM HISTORY: suprapubic pain with recurrent utis   TECHNIQUE: Helical acquisition of image data was performed for the " abdomen and pelvis without intravenous contrast. Multiplanar reconstructions were performed and reviewed. CONTRAST: None. COMPARISON: CT abdomen/pelvis 3/8/2025, CT chest, abdomen and pelvis 1/22/2025 FINDINGS: Lower thorax: Small bilateral pleural effusions with adjacent atelectasis. Similar degree of right chronic calcified opacities in the right greater than left lower lobe. Interlobular septal thickening at the bases. Reticular opacities peripherally in the lingula and right middle lobe. Aortic and mitral annuli calcifications. Multivessel coronary artery atherosclerotic calcifications. Liver: Postsurgical changes of partial liver transplantation. Unchanged curvilinear hypodensity in the inferior right lobe. Gallbladder/biliary tree: The common bile duct is not dilated. Status post cholecystectomy. Pancreas: Diffuse fatty atrophic changes without ductal dilatation. Stable 1.2 cm likely cyst of the pancreatic head. Spleen: The spleen is not enlarged. Adrenal glands: No adrenal nodules. Kidneys/ureters: Nonspecific bilateral perinephric fat stranding. Atrophic changes bilaterally. Left nephrolithiasis measuring up to 4 mm, versus vascular calcification bilaterally. No hydronephrosis. Unchanged indeterminate density exophytic left-sided renal cysts. Stomach: Unremarkable. Bladder/pelvic organs: Distended bladder within normal limits. Bowel/mesentery: No dilated loops of small bowel or colon. The appendix is surgically absent. Colonic diverticulosis. There is trace free fluid about the sigmoid colon and mild presacral edema. Increased size of a soft tissue/fluid density posterior to the sigmoid colon containing a focus of air (5/343), measuring 2.3 x 1.7 cm, previously 1.6 x 1.0 cm which previously demonstrated a focus of air. Peritoneum/retroperitoneum: No extraluminal bowel gas. No free fluid in the abdomen or pelvis. Lymph nodes: No lymphadenopathy. Major vessels: No aneurysmal dilatation. Scattered  atherosclerotic calcifications. Soft tissues: No acute soft tissue abnormality. Osseous structures: Degenerative changes of the spine. No acute or suspicious osseous abnormality.     IMPRESSION: 1.  New fat stranding and mesenteric edema about the distal sigmoid colon in the midline, nonspecific, but may represent diverticulitis. Suspected contained microperforation with small fluid collection containing tiny focus of air posterior to the sigmoid colon. 2.  Distended bladder within normal limits. 3.  Unchanged fluid density 1.2 cm lesion in the region of the pancreatic head. 4.  Stable postsurgical changes of liver transplant and curvilinear hypodensity in the inferior right lobe. 5.  Other chronic CT findings including small bilateral pleural effusions and right greater than left basilar calcified opacities are unchanged. I have personally reviewed the examination and initial interpretation and I agree with the findings. HERI HELTON MD   SYSTEM ID:  [id number]      Thomas Silva MD  General Surgery Residency     The above plan of care was performed and communicated to me by Dr. Campbell

## 2025-04-17 NOTE — PROGRESS NOTES
Care Management Follow Up    Length of Stay (days): 3    Expected Discharge Date: 04/19/2025     Concerns to be Addressed: discharge planning     Patient plan of care discussed at interdisciplinary rounds: No    Anticipated Discharge Disposition: Post Acute Facility      Anticipated Discharge Services: None  Anticipated Discharge DME: None    Patient/family educated on Medicare website which has current facility and service quality ratings: no  Education Provided on the Discharge Plan: no  Patient/Family in Agreement with the Plan: yes    Referrals Placed by CM/SW: Homecare, Internal Clinic Care Coordination, TCU    [facility] [place]  (P: [phone], F: [phone])    4/17: SW placed initial referral.     Private pay costs discussed: Not applicable    Discussed  Partnership in Safe Discharge Planning  document with patient/family: No     Handoff Completed: No, handoff not indicated or clinically appropriate    Additional Information:  SW received updates from RNCC from 7A to start placing the referral for [facility] [place]. SW confirmed with RNCC that referral was placed and would meet with pt tomorrow about the TCU list for additional options.     Next Steps: SW to follow up with pt about additional TCUs from list. Follow up with [facility] [place] (number one choice for pt).     SHAHANA Valdez   HUMA  [facility]  Available via AppS[employer]  [address]   Ph: [phone]

## 2025-04-17 NOTE — PROGRESS NOTES
"  HEPATOLOGY PROGRESS NOTE  Patient:  Luz Thompson, Date of birth 1950  Date of Visit:  04/17/2025  Referring Provider No ref. 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 Faecalis bacteremia (3/2025) and recurrent UTI's who was admitted with generalized weakness.  Abdominal imaging with new finding of sigmoid contained perforation.       RECOMMENDATIONS:    Updates for 04/17/2025 :  - decrease prednisone to 9 mg daily  - CRS evaluation    # Micro bowel perforation  - agree with CRS evaluation. Abdominal imaging with small contained area of likely perforation. No plans for surgery per CRS. If surgery needed in future, will need to change immunosuppression given poor wound healing with sirolimus.   - Prior fluid collection near sigmoid in 12/2024. Has had rectal perforation in the distant past with retroflexion on colonoscopy. Continue to monitor.   - remains on ertepenem per tx ID.     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 Blood and urine cultures ngtd. Transplant ID following. Remains on ertepenem per tx ID.   - has been on sirolimus 1 mg daily, trough level on 4/15 was 3.4. No change in dose.   - will need slow taper of prednisone. Will decrease dose to 9 mg daily for now.   - Liver tests wnl.     Next follow up appointment: Dr Ramirez 8/22/25    Thank you for the opportunity to be involved with  Luz Thompson care. Please call with any questions or concerns.    Luzma Osman, APRN, CNP  Inpatient Hepatology [facility]              Subjective    Denies abdominal pain, no fevers, melena or hematochezia.         Objective    VS: /68   Pulse 63   Temp 97.8  F (36.6  C) (Oral)   Resp 18   Ht 1.702 m (5' 7\")   Wt 76.4 kg (168 lb 6.4 oz)   LMP 06/01/1988 " (Approximate)   SpO2 95%   BMI 26.38 kg/m        General: In no acute distress, no facial muscle wasting  Neuro: AOx3, No asterixis  HEENT:  Noscleral icterus, Nooral lesions  CV: . Skin warm and dry  Lungs:  Respirations even and nonlabored on room air  Abd:  Nontender, nondistended   Extrem:  +1 lower  peripheral edema   Skin: Nojaundice  Psych: pleasant    MEDICATIONS:  Current Facility-Administered Medications   Medication Dose Route Frequency Provider Last Rate Last Admin    acetaminophen (TYLENOL) tablet 650 mg  650 mg Oral Q6H PRN Surekha Moncada MD   650 mg at 04/16/25 1414    [Held by provider] amLODIPine (NORVASC) tablet 2.5 mg  2.5 mg Oral Daily Surekha Moncada MD        apixaban ANTICOAGULANT (ELIQUIS) tablet 5 mg  5 mg Oral BID Surekha Moncada MD   5 mg at 04/17/25 0818    benzocaine-menthol (CHLORASEPTIC MAX) 15-10 MG lozenge 1 lozenge  1 lozenge Buccal Q1H PRN Surekha Moncada MD        glucose gel 15-30 g  15-30 g Oral Q15 Min PRN Eben Kennedy MD        Or    dextrose 50 % injection 25-50 mL  25-50 mL Intravenous Q15 Min PRN Eben Kennedy MD        Or    glucagon injection 1 mg  1 mg Subcutaneous Q15 Min PRN Eben Kennedy MD        diphenhydrAMINE (BENADRYL) capsule 25 mg  25 mg Oral Q6H PRN Eben Kennedy MD        Or    diphenhydrAMINE (BENADRYL) injection 25 mg  25 mg Intravenous Q6H PRN Eben Kennedy MD   25 mg at 04/16/25 2239    ertapenem (INVanz) 1 g vial to attach to  mL bag  1 g Intravenous Q24H Surekha Moncada MD 0 mL/hr at 04/15/25 2317 1 g at 04/16/25 2241    [Held by provider] evolocumab (REPATHA) injection 140 mg  140 mg Subcutaneous Q14 Days Surekha Moncada MD        ezetimibe (ZETIA) tablet 10 mg  10 mg Oral Daily Surekha Moncada MD   10 mg at 04/17/25 0817    [Held by provider] ferrous sulfate (FEROSUL) tablet 325 mg  325 mg Oral BID PRN Surekha Moncada MD        gabapentin (NEURONTIN) solution 300 mg  300 mg Oral At Bedtime Surekha Moncada MD   300 mg at 04/16/25 7075    [Held by provider] hydrALAZINE  (APRESOLINE) tablet 50 mg  50 mg Oral TID Surekha Moncada MD        insulin aspart (NovoLOG) injection (RAPID ACTING)  1-3 Units Subcutaneous TID AC Eben Kennedy MD   1 Units at 04/16/25 1807    insulin aspart (NovoLOG) injection (RAPID ACTING)  1-3 Units Subcutaneous At Bedtime Eben Kennedy MD   1 Units at 04/16/25 2241    levothyroxine (SYNTHROID/LEVOTHROID) tablet 175 mcg  175 mcg Oral Daily Surekha Moncada MD   175 mcg at 04/17/25 0818    lidocaine (LMX4) cream   Topical Q1H PRN Surekha Moncada MD        lidocaine 1 % 0.1-1 mL  0.1-1 mL Other Q1H PRN Surekha Moncada MD        [Held by provider] linagliptin (TRADJENTA) tablet 5 mg  5 mg Oral Daily Surekha Moncada MD        loperamide (IMODIUM) capsule 2 mg  2 mg Oral 4x Daily PRN Dianne Pederson MD        melatonin tablet 1 mg  1 mg Oral At Bedtime PRN Surekha Moncada MD        [Held by provider] metFORMIN (GLUCOPHAGE XR) 24 hr tablet 500 mg  500 mg Oral Daily with supper Surekha Moncada MD        metoprolol succinate ER (TOPROL XL) 24 hr tablet 25 mg  25 mg Oral Daily Surekha Moncada MD   25 mg at 04/17/25 0818    omega-3 acid ethyl esters (LOVAZA) capsule 1 g  1 g Oral BID Dory Miller MD   1 g at 04/17/25 0818    ondansetron (ZOFRAN ODT) ODT tab 4 mg  4 mg Oral Q6H PRN Surekha Moncada MD        Or    ondansetron (ZOFRAN) injection 4 mg  4 mg Intravenous Q6H PRN Surekha Moncada MD        oxyCODONE (ROXICODONE) tablet 5 mg  5 mg Oral Q6H PRN Surekha Moncada MD        Patient is already receiving anticoagulation with heparin, enoxaparin (LOVENOX), warfarin (COUMADIN)  or other anticoagulant medication   Does not apply Continuous PRN Surekha Moncada MD        polyethylene glycol (MIRALAX) Packet 17 g  17 g Oral Daily Surekha Moncada MD   17 g at 04/16/25 0940    predniSONE (DELTASONE) tablet 10 mg  10 mg Oral Daily Surekha Moncada MD   10 mg at 04/17/25 0818    senna-docusate (SENOKOT-S/PERICOLACE) 8.6-50 MG per tablet 1 tablet  1 tablet Oral BID PRN Surekha Moncada MD        Or    dannyte  (SENOKOT-S/PERICOLACE) 8.6-50 MG per tablet 2 tablet  2 tablet Oral BID PRN Surekha Moncada MD        sirolimus (GENERIC EQUIVALENT) tablet 1 mg  1 mg Oral Daily Surekha Moncada MD   1 mg at 04/17/25 0817    sodium chloride (PF) 0.9% PF flush 3 mL  3 mL Intracatheter Q8H LUZMARIA Surekha Moncada MD   3 mL at 04/16/25 2240    sodium chloride (PF) 0.9% PF flush 3 mL  3 mL Intracatheter q1 min prn Surekha Moncada MD   3 mL at 04/15/25 0831    ursodiol (ACTIGALL) tablet 250 mg  250 mg Oral BID Surekha Moncada MD   250 mg at 04/17/25 0818       REVIEW OF LABORATORY, PATHOLOGY AND IMAGING RESULTS:  BMP  Recent Labs   Lab 04/17/25  0925 04/17/25  0838 04/17/25  0617 04/17/25  0211 04/16/25  0818 04/16/25  0611 04/15/25  2150 04/15/25  0613 04/14/25 2113 04/14/25  1410   NA  --  142  --   --   --  140  --  141  --  141   POTASSIUM  --  4.3  --   --   --  3.8  --  3.4  --  3.4   CHLORIDE  --  109*  --   --   --  108*  --  107  --  107   KEILY  --  8.8  --   --   --  8.5*  --  8.4*  --  9.0   CO2  --  24  --   --   --  22  --  23  --  22   BUN  --  37.3*  --   --   --  36.2*  --  26.6*  --  25.9*   CR  --  1.65*  --   --   --  1.68*  --  1.69*  --  1.42*   * 118* 139* 168*   < > 166*   < > 103*   < > 94    < > = values in this interval not displayed.     CBC  Recent Labs   Lab 04/17/25  0838 04/16/25  0611 04/15/25  0613 04/14/25  1410   WBC 8.9 11.5* 15.1* 14.8*   RBC 3.24* 3.20* 3.37* 3.61*   HGB 8.8* 8.8* 9.2* 9.9*   HCT 29.7* 28.8* 30.3* 31.7*   MCV 92 90 90 88   MCH 27.2 27.5 27.3 27.4   MCHC 29.6* 30.6* 30.4* 31.2*   RDW 17.8* 18.0* 18.7* 18.7*    312 331 369     INRNo lab results found in last 7 days.  LFTs  Recent Labs   Lab 04/17/25  0838 04/16/25  0611 04/15/25  0613 04/14/25  1410   ALKPHOS 87 86 74 84   AST 23 17 17 16   ALT 23 15 12 12   BILITOTAL <0.2 <0.2 0.3 0.3   PROTTOTAL 6.1* 5.4* 5.3* 5.9*   ALBUMIN 3.2* 2.9* 2.8* 3.3*          Previous work-up:   Lab Results   Component Value Date    HEPBANG Nonreactive 05/13/2016     "HBCAB Nonreactive 05/13/2016    AUSAB 0.09 05/13/2016    HCVAB  05/13/2016     Nonreactive   Assay performance characteristics have not been established for newborns,   infants, and children      HCVRNA <25 12/23/2008    LAURA 44 04/07/2025    IRONSAT 13 (L) 04/07/2025     08/05/2019    TSH 1.72 04/04/2025    CHOL 291 (H) 09/04/2024    HDL 72 09/04/2024     (H) 09/04/2024    TRIG 329 (H) 09/04/2024    A1C 6.8 (H) 04/14/2025      No results found for: \"SPECDES\", \"LDRESULTS\"      Imaging Results:  CT abd wo 4/16/25  IMPRESSION:  1.  New fat stranding and mesenteric edema about the distal sigmoid  colon in the midline, nonspecific, but may represent diverticulitis.  Suspected contained microperforation with small fluid collection  containing tiny focus of air posterior to the sigmoid colon.  2.  Distended bladder within normal limits.  3.  Unchanged fluid density 1.2 cm lesion in the region of the  pancreatic head.  4.  Stable postsurgical changes of liver transplant and curvilinear  hypodensity in the inferior right lobe.  5.  Other chronic CT findings including small bilateral pleural  effusions and right greater than left basilar calcified opacities are  unchanged.       "

## 2025-04-18 ENCOUNTER — APPOINTMENT (OUTPATIENT)
Dept: PHYSICAL THERAPY | Facility: CLINIC | Age: 75
End: 2025-04-18
Payer: MEDICARE

## 2025-04-18 ENCOUNTER — APPOINTMENT (OUTPATIENT)
Dept: OCCUPATIONAL THERAPY | Facility: CLINIC | Age: 75
End: 2025-04-18
Payer: MEDICARE

## 2025-04-18 LAB
ALBUMIN SERPL BCG-MCNC: 3.1 G/DL (ref 3.5–5.2)
ALP SERPL-CCNC: 89 U/L (ref 40–150)
ALT SERPL W P-5'-P-CCNC: 49 U/L (ref 0–50)
ANION GAP SERPL CALCULATED.3IONS-SCNC: 10 MMOL/L (ref 7–15)
AST SERPL W P-5'-P-CCNC: 45 U/L (ref 0–45)
BILIRUB SERPL-MCNC: <0.2 MG/DL
BUN SERPL-MCNC: 35.3 MG/DL (ref 8–23)
CALCIUM SERPL-MCNC: 8.7 MG/DL (ref 8.8–10.4)
CHLORIDE SERPL-SCNC: 109 MMOL/L (ref 98–107)
CREAT SERPL-MCNC: 1.4 MG/DL (ref 0.51–0.95)
EGFRCR SERPLBLD CKD-EPI 2021: 39 ML/MIN/1.73M2
ERYTHROCYTE [DISTWIDTH] IN BLOOD BY AUTOMATED COUNT: 17.5 % (ref 10–15)
GLUCOSE BLDC GLUCOMTR-MCNC: 154 MG/DL (ref 70–99)
GLUCOSE BLDC GLUCOMTR-MCNC: 158 MG/DL (ref 70–99)
GLUCOSE BLDC GLUCOMTR-MCNC: 286 MG/DL (ref 70–99)
GLUCOSE BLDC GLUCOMTR-MCNC: 319 MG/DL (ref 70–99)
GLUCOSE BLDC GLUCOMTR-MCNC: 92 MG/DL (ref 70–99)
GLUCOSE SERPL-MCNC: 122 MG/DL (ref 70–99)
HCO3 SERPL-SCNC: 22 MMOL/L (ref 22–29)
HCT VFR BLD AUTO: 28.8 % (ref 35–47)
HGB BLD-MCNC: 8.7 G/DL (ref 11.7–15.7)
MCH RBC QN AUTO: 27.2 PG (ref 26.5–33)
MCHC RBC AUTO-ENTMCNC: 30.2 G/DL (ref 31.5–36.5)
MCV RBC AUTO: 90 FL (ref 78–100)
PLATELET # BLD AUTO: 347 10E3/UL (ref 150–450)
POTASSIUM SERPL-SCNC: 4.2 MMOL/L (ref 3.4–5.3)
PROT SERPL-MCNC: 5.8 G/DL (ref 6.4–8.3)
RBC # BLD AUTO: 3.2 10E6/UL (ref 3.8–5.2)
SODIUM SERPL-SCNC: 141 MMOL/L (ref 135–145)
WBC # BLD AUTO: 7.1 10E3/UL (ref 4–11)

## 2025-04-18 PROCEDURE — G0545 PR INHRENT VISIT TO INPT/OBS W CNFRM/SUSPCT INFCT DIS BY INFCT DIS SPCIALST: HCPCS | Performed by: INTERNAL MEDICINE

## 2025-04-18 PROCEDURE — 250N000011 HC RX IP 250 OP 636

## 2025-04-18 PROCEDURE — 250N000012 HC RX MED GY IP 250 OP 636 PS 637

## 2025-04-18 PROCEDURE — 97530 THERAPEUTIC ACTIVITIES: CPT | Mod: GO

## 2025-04-18 PROCEDURE — 250N000012 HC RX MED GY IP 250 OP 636 PS 637: Performed by: NURSE PRACTITIONER

## 2025-04-18 PROCEDURE — 250N000011 HC RX IP 250 OP 636: Mod: JZ

## 2025-04-18 PROCEDURE — 97116 GAIT TRAINING THERAPY: CPT | Mod: GP

## 2025-04-18 PROCEDURE — 97535 SELF CARE MNGMENT TRAINING: CPT | Mod: GO

## 2025-04-18 PROCEDURE — 120N000002 HC R&B MED SURG/OB UMMC

## 2025-04-18 PROCEDURE — 250N000013 HC RX MED GY IP 250 OP 250 PS 637

## 2025-04-18 PROCEDURE — 85027 COMPLETE CBC AUTOMATED: CPT

## 2025-04-18 PROCEDURE — 36415 COLL VENOUS BLD VENIPUNCTURE: CPT

## 2025-04-18 PROCEDURE — 99232 SBSQ HOSP IP/OBS MODERATE 35: CPT | Mod: GC | Performed by: INTERNAL MEDICINE

## 2025-04-18 PROCEDURE — 84155 ASSAY OF PROTEIN SERUM: CPT

## 2025-04-18 PROCEDURE — 97530 THERAPEUTIC ACTIVITIES: CPT | Mod: GP

## 2025-04-18 PROCEDURE — 99233 SBSQ HOSP IP/OBS HIGH 50: CPT | Mod: 24 | Performed by: INTERNAL MEDICINE

## 2025-04-18 RX ORDER — LIDOCAINE 4 G/G
2 PATCH TOPICAL DAILY PRN
Status: DISCONTINUED | OUTPATIENT
Start: 2025-04-18 | End: 2025-04-23 | Stop reason: HOSPADM

## 2025-04-18 RX ADMIN — ACETAMINOPHEN 650 MG: 325 TABLET, FILM COATED ORAL at 13:25

## 2025-04-18 RX ADMIN — URSODIOL 250 MG: 250 TABLET ORAL at 20:54

## 2025-04-18 RX ADMIN — Medication 9 MG: at 09:27

## 2025-04-18 RX ADMIN — DICLOFENAC SODIUM 4 G: 10 GEL TOPICAL at 20:55

## 2025-04-18 RX ADMIN — OMEGA-3-ACID ETHYL ESTERS 1 G: 1 CAPSULE, LIQUID FILLED ORAL at 09:28

## 2025-04-18 RX ADMIN — SIROLIMUS 1 MG: 1 TABLET, FILM COATED ORAL at 09:28

## 2025-04-18 RX ADMIN — URSODIOL 250 MG: 250 TABLET ORAL at 09:27

## 2025-04-18 RX ADMIN — GABAPENTIN 300 MG: 250 SOLUTION ORAL at 20:55

## 2025-04-18 RX ADMIN — LIDOCAINE 4% 2 PATCH: 40 PATCH TOPICAL at 14:18

## 2025-04-18 RX ADMIN — EZETIMIBE 10 MG: 10 TABLET ORAL at 09:28

## 2025-04-18 RX ADMIN — APIXABAN 5 MG: 5 TABLET, FILM COATED ORAL at 20:54

## 2025-04-18 RX ADMIN — DIPHENHYDRAMINE HYDROCHLORIDE 25 MG: 50 INJECTION, SOLUTION INTRAMUSCULAR; INTRAVENOUS at 22:57

## 2025-04-18 RX ADMIN — OMEGA-3-ACID ETHYL ESTERS 1 G: 1 CAPSULE, LIQUID FILLED ORAL at 20:53

## 2025-04-18 RX ADMIN — METOPROLOL SUCCINATE 25 MG: 25 TABLET, EXTENDED RELEASE ORAL at 09:28

## 2025-04-18 RX ADMIN — ERTAPENEM SODIUM 1 G: 1 INJECTION, POWDER, LYOPHILIZED, FOR SOLUTION INTRAMUSCULAR; INTRAVENOUS at 22:50

## 2025-04-18 RX ADMIN — LEVOTHYROXINE SODIUM 175 MCG: 0.03 TABLET ORAL at 09:28

## 2025-04-18 RX ADMIN — APIXABAN 5 MG: 5 TABLET, FILM COATED ORAL at 09:28

## 2025-04-18 ASSESSMENT — ACTIVITIES OF DAILY LIVING (ADL)
ADLS_ACUITY_SCORE: 58
ADLS_ACUITY_SCORE: 58
ADLS_ACUITY_SCORE: 61
ADLS_ACUITY_SCORE: 58
ADLS_ACUITY_SCORE: 61
ADLS_ACUITY_SCORE: 58
ADLS_ACUITY_SCORE: 58
ADLS_ACUITY_SCORE: 61
ADLS_ACUITY_SCORE: 58
ADLS_ACUITY_SCORE: 61
ADLS_ACUITY_SCORE: 58
ADLS_ACUITY_SCORE: 58
ADLS_ACUITY_SCORE: 61
ADLS_ACUITY_SCORE: 58
ADLS_ACUITY_SCORE: 61
ADLS_ACUITY_SCORE: 61
ADLS_ACUITY_SCORE: 58

## 2025-04-18 NOTE — PLAN OF CARE
Goal Outcome Evaluation:      Plan of Care Reviewed With: patient    Overall Patient Progress: improvingOverall Patient Progress: improving    Outcome Evaluation: tolerate diet advancement    Assumed cares [id number]. A&O and able to make needs known. VSS on RA. Tele reads NSR and intermittent afib. Pain managed with Tylenol and Lidocaine patches PRN. Declined Diclofenac gel. Pt up with 1 assist to bedside commode. Having good UO. No BM this shift. Diet advanced to FLD, then regular. Tolerating well so far. Continue with plan of care.

## 2025-04-18 NOTE — PLAN OF CARE
Goal Outcome Evaluation:      Plan of Care Reviewed With: patient    Overall Patient Progress: improvingOverall Patient Progress: improving         Shift: [id number]  VS: VSS on 1L O2  Neuro: Aox4  Respiratory: WDL, on 1L O2 NC  Cardiac: WDL  Pain/Nausea: denies pain and nausea   Diet: clear liquid diet   IV Access: Right midline  Infusion(s): IV abx  GI/: Primafit overnight, LBM 4/15  Skin: Redness on coccyc, mepilex in place, wound on L cheek.  Mobility: Assist x 1, GB

## 2025-04-18 NOTE — PLAN OF CARE
Shift Hours: 1500 - 2300    Assessment:  Body systems assessments were at patient's baseline.        Activity     Fall Risk Score: 70   Bed alarm on? Yes     Activity Assistance Provided: assistance, 1 person, assistance, 2 people      Assistive Device Utilized: walker    Pain: Denies    Labs/RN Managed Protocols: Non    Lines/Drains: Left PIV    Nutrition:  Clear Liquid diet    Goal Outcome Evaluation  Plan of Care Reviewed With: patient  Overall Patient Progress: no change  Outcome Evaluation: VSS, denied pain/discmfort. Report bilateral LE numbness. BG level up to 270 m/dlwith Insulin coverage prior to Lunch. Continued on antibiotic.    Barriers to Discharge:   Awaiting TCU placement

## 2025-04-18 NOTE — PROGRESS NOTES
[facility] EMERGENCY DEPARTMENT  [address] [address]  Phone: [phone]    Patient:  Luz Thompson, Date of birth 1950  MRN: [id number]  4/14/2025  Date of Visit:  04/18/2025  Referring Provider Thoams Luz  Reason for consult: fever      Assessment & Plan    Recommendations:   Continue ertapenem 1 gram IV every 24 hours, see CARRIE Rain on 4/17/25.  Of note, she endorsed numbness of her mouth with ertapenem but no swelling, cough, SOB. She has tried taking benadryl with it. This may not truly be an allergy especially since she has tolerated this antibiotic numerous time before.   Duration will be based on repeat imaging in 2-4 weeks. Please order outpatient CT A/P w/o contrast.  ID follow up scheduled on 4/28/25 (this was scheduled prior to the 5/8 appt so we kept the 4/28 appt instead)  Discussed with the patient and medicine team. Transplant ID will sign off. Call with questions.     Sindhu Del Rio D.O.   Infectious Diseases  Contact information available via [facility] Paging/Directory       Assessment:  74 y/o female with a history of a liver transplant (2006), recent Enterococcus faecalis bacteremia and K pneumoniae pyelonephritis s/p ertapenem (completed 3/28/25) and vancomycin (completed 3/22/25), recurrent polymicrobial ESBL UTI (dx 4/7/25) on ertapenem who presented on 4/14/25 for generalized weakness subsequently found to have diverticulitis and microperforation.    Generalized weakness  Leukocytosis, improved  History of diverticulosis  Abdominal pain 2/2 to diverticulitis w/ a contained microperforation  Requested CT in the setting of low grade fever and abdominal pain. 4/16 CT A/P noted colonic diverticulosis, trace fluid, fat stranding, and edema around the distal sigmoid colon. Increased size of a fluid density w/ a focus of air (2.3 x 1.7 cm). This may represent diverticulitis with a contained microperforation.Of note, in 12/2024 she was found to a  left colonic diverticula and small amount of fluid adjacent to the sigmoid colon possibly secondary to acute diverticulitis.  She also has a remote history of rectal perforation w/ retroflexion on colonoscopy. CRS does not plan on surgery. Planning for medication management. I don't think this is failure of ertapenem but more related to the microperforation. Clinically she has improved. Will plan to continue ertapenem with repeat imaging in 2-4 weeks. ID follow up planned for 4/28/25.    ESBL polymicrobial pyelonephritis, improved  Restarted ertapenem ~ 4/8 after a 4/7 urine culture resulted with ESBL K oxytoca and ESBL Proteus vulgarius. 4/14 UA demonstrates an improved UA profile. Urine culture < 10K normal louann. 4/14 blood cultures are negative. 4/16 CT notes that she has non-specific bilateral perinephric fat stranding, left nephrolithiasis 4 mm vs vascular calcification. Remains on ertapenem for this as well as diverticulitis. Improved.     Previous ID issues:   Enterococcus faecalis bacteremia  3/8 blood culture positive for E faecalis.  TTE was negative for endocarditis. Suspect source is from an episode of multiple loose stools and abdominal pain with transient bacterial translocation. Attempted to transition vancomycin to linezolid however had mouth numbness similar to her adverse reactions to antibiotics that makes her uncomfortable with this plan at home. Completed vancomycin on 3/22/25.      Klebsiella pneumoniae pyelonephritis  3/8 urine culture with Klebsiella pneumoniae and on the liver US 3/8, there is comment of echogenic renal parenchyma and in conjunction of symptoms of intermittent flank pain for a year and pyuria on urine studies for months, favoring treating for an extended course for pyelonephritis. Her antibiotic allergies complicate antibiotic choices here, the only oral option she has tolerated fosfomycin is resistant. Completed course with IV ertapenem due to allergies - completed  "3/28/25. At the end of treatment will plan for a surveillance urinary culture 1 week after ertapenem is complete which prompted the UA on 4/7.     Hx coccidioides  Dx in winter 0636-2476, did not tolerate fluconazole due to severe rash. Treated with voriconazole, continued until her return to [place]. CXR with stable calcifications. Recent fungal antibody panel negative though unclear how reliable this is. Voriconazole was stopped 6/2024. Cocci antigen was negative.     Hx EBV viremia s/p rituxan 2016: No lymphadenopathy on recent abdomen/pelvis CT. Not detected 1/20/2025     Numerous reported antimicrobial allergies  Per allergy note 8/2/2019 \"Prick and intradermal and patch testing to fluconazole, doxycycline, azithromycin, penicillins, and cephalosporins with immediate and delayed readings being negative.\" States she will never take fluconazole again. Would be candidate for oral provocation testing in future with pcn. Also notes an allergy to the  capsules, states tolerates pills or liquid formulations, sometimes needs them compounded.Of note, she endorsed numbness of her mouth with ertapenem but no swelling, cough, SOB. She has tried taking benadryl with it. This may not truly be an allergy especially since she has tolerated this antibiotic numerous time before. She has continued to do okay with this medication.      Transplant check list:  - Current immunosuppression: sirolimus, prednisone  - QTc interval: 409 ms on 4/14/25  - Bacterial coverage: as above  - Pneumocystis prophylaxis: none  - Serostatus & viral prophylaxis: EBV R+, CMV R-  - Fungal prophylaxis: none, previously voriconazole  - Risk factors to suggest check of Toxoplasma, Strongy, or Schisto serology?: toxoplasma negative 1/20/25  - Immunization status: Up to date on influenza, COVID; due for Tdap  - Gamma globulin status: 1110 on 8/5/2019  - Isolation status: Good Contact for ESBL history  - Code status: Full Code    Medical Decision Making " "  This patient visit is eligible for billing by the  code      History of Present Illness   Pertinent history obtain from: chart review and patient  Last seen 4/17. Abdominal pain improved. She has not had a bowel movement but is passing flatus. No subjective fevers or chills.   Liquid diet advanced to a full liquid diet  Taking about rehab for her  She is getting up to use the bathroom instead of commode or depends    Physical Exam     Vital signs:  /60 (BP Location: Left arm)   Pulse 67   Temp 98.3  F (36.8  C) (Oral)   Resp 16   Ht 1.702 m (5' 7\")   Wt 76.4 kg (168 lb 6.4 oz)   LMP 06/01/1988 (Approximate)   SpO2 100%   BMI 26.38 kg/m      GENERAL: chronically ill appearing, weak, laying in bed  ENT: edentulous but dentures in place  RESP: lungs clear to auscultation - no rales, rhonchi or wheezes, RA, no accessory muscle use  CV: regular rate and rhythm, normal S1 S2, no murmur,, trace peripheral edema  ABDOMEN: soft, not distended, no tenderness over the abdomen w/ palpation, bowel sounds normal  MS and SKIN: no joint swelling, bilateral erythematous skin areas over the lower calves  NEURO: generally weak, mentation intact and speech normal. Alert.     Data   Laboratory data and imaging listed below was reviewed prior to this encounter.     Microbiology:    Culture   Date Value Ref Range Status   04/14/2025 No growth after 3 days  Preliminary   04/14/2025 <10,000 CFU/mL Urogenital louann  Final   04/14/2025 No growth after 4 days  Preliminary   04/07/2025 50,000-100,000 CFU/mL Klebsiella oxytoca ESBL (A)  Final   04/07/2025 50,000-100,000 CFU/mL Proteus vulgaris ESBL (A)  Final   03/09/2025 No Growth  Final   03/09/2025 No Growth  Final   03/08/2025 No Growth  Final   03/08/2025 >100,000 CFU/mL Klebsiella pneumoniae (A)  Final   03/08/2025 Positive on the 1st day of incubation (A)  Final   03/08/2025 Enterococcus faecalis (AA)  Final     Comment:     2 of 2 bottles   02/07/2025 10,000-50,000 " CFU/mL Mixture of Urogenital Louann  Final   01/22/2025 10,000-50,000 CFU/mL Mixture of urogenital louann  Final   01/21/2025 >100,000 CFU/mL Mixture of Urogenital Louann  Final   01/20/2025 No Growth  Final   01/20/2025 No Growth  Final   01/20/2025 No Growth  Final   01/19/2025 <10,000 CFU/mL Mixture of Urogenital Louann  Final   01/19/2025 No Growth  Final   01/19/2025 Positive on the 1st day of incubation (A)  Final   01/19/2025 Staphylococcus epidermidis (AA)  Final     Comment:     1 of 2 bottles  The recovery of this organism from a single blood culture bottle most likely represents contamination. If susceptibility testing is needed, please refer to the antibiogram or contact [facility]. If contamination is suspected, it is important to repeat two sets of blood cultures from two different sites.   01/10/2025 <10,000 CFU/mL Urogenital louann  Final   01/07/2025 No Growth  Final   01/06/2025 No Growth  Final   01/03/2025 No Growth  Final   01/03/2025 No Growth  Final   12/10/2024 No Growth  Final   12/10/2024 No Growth  Final   12/10/2024 >100,000 CFU/mL Klebsiella pneumoniae (A)  Final   11/06/2024 No Growth  Final   10/11/2024 No Growth  Final   10/11/2024 50,000-100,000 CFU/mL Mixture of Urogenital Louann  Final   09/30/2024 No Growth  Final   09/22/2024 >100,000 CFU/mL Klebsiella oxytoca ESBL (A)  Final   09/22/2024 Positive on the 1st day of incubation (A)  Final   09/22/2024 Klebsiella oxytoca ESBL (AA)  Final     Comment:     2 of 2 bottles   09/22/2024 Pseudomonas aeruginosa (AA)  Final     Comment:     1 of 2 bottles   08/20/2024 >100,000 CFU/mL Klebsiella oxytoca ESBL (A)  Final   , Inflammatory Markers:   Recent Labs   Lab Test 04/15/25  0613 08/26/19  2331 05/20/19  0957 07/30/18  0855 09/03/17  0912 09/02/17  0648 09/01/17  0832   SED 61* 78* 47* 73*  --   --   --    CRP  --  180.0* <2.9 5.2 64.0* 71.0* 51.0*   , Urine Studies:    Recent Labs   Lab Test 04/14/25  1430 04/07/25  0945 04/07/25  0935  03/08/25  1737 02/07/25  0215   LEUKEST Moderate* Large* Large* Large* Small*   WBCU 85* >182* >100* >182* 10-25*   , Hematology Studies:    Recent Labs   Lab Test 04/18/25  0618 04/17/25  0838 04/16/25  0611 04/15/25  0613 04/14/25  1410 04/07/25  0945 01/04/25  0742 01/03/25  1733 10/11/24  2212 09/30/24  1545 08/27/19  0532 08/26/19  2331 05/19/18  0629 05/18/18  0731 09/03/17  0912 09/02/17  0648 09/01/17  0832   WBC 7.1 8.9 11.5* 15.1* 14.8* 6.5   < > 9.2   < > 8.2   < > 12.5*   < > 8.0   < > 10.2 9.4   ANEU  --   --   --   --   --   --   --  6.9  --  3.7  --  10.8*  --  5.0  --  7.4 6.5   AEOS  --   --   --   --   --   --   --  0.0  --  0.2  --  0.0  --  0.1  --  0.2 0.1   HGB 8.7* 8.8* 8.8* 9.2* 9.9* 9.1*   < > 10.2*   < > 11.1*   < > 10.5*   < > 11.6*   < > 10.5* 11.1*   MCV 90 92 90 90 88 88   < > 89   < > 93   < > 86   < > 86   < > 89 87    343 312 331 369 351   < > 418   < > 388   < > 226   < > 324   < > 259 269    < > = values in this interval not displayed.    , Metabolic Studies:   Recent Labs   Lab Test 04/18/25  0618 04/17/25  0838 04/16/25  0611 04/15/25  0613 04/14/25  1410    142 140 141 141   POTASSIUM 4.2 4.3 3.8 3.4 3.4   CHLORIDE 109* 109* 108* 107 107   CO2 22 24 22 23 22   BUN 35.3* 37.3* 36.2* 26.6* 25.9*   CR 1.40* 1.65* 1.68* 1.69* 1.42*   GFRESTIMATED 39* 32* 31* 31* 38*   , and Hepatic Studies:   Recent Labs   Lab Test 04/18/25  0618 04/17/25  0838 04/16/25  0611 04/15/25  0613 04/14/25  1410 04/07/25  0945 04/04/25  1159   BILITOTAL <0.2 <0.2 <0.2 0.3 0.3  --  0.4   ALKPHOS 89 87 86 74 84  --  137   ALBUMIN 3.1* 3.2* 2.9* 2.8* 3.3* 3.5 3.6   AST 45 23 17 17 16  --  104*   ALT 49 23 15 12 12 40 88*

## 2025-04-18 NOTE — PROGRESS NOTES
Surgery Progress Note  04/18/2025       Subjective:  Asleep when we arrived. Said her pain has improved. No pain on exam. Tolerating clears. Not yet had a BM.      Objective:  Temp:  [97.8  F (36.6  C)-98.7  F (37.1  C)] 98.2  F (36.8  C)  Pulse:  [60-71] 60  Resp:  [16-20] 16  BP: (104-141)/(47-78) 130/54  SpO2:  [93 %-99 %] 99 %    I/O last 3 completed shifts:  In: 340 [P.O.:340]  Out: 450 [Urine:450]      Gen: Awake, alert, NAD  Resp: NLB on NA  Abd: soft, nondistended, nontender    Labs:  Recent Labs   Lab 04/18/25  0618 04/17/25  0838 04/16/25  0611   WBC 7.1 8.9 11.5*   HGB 8.7* 8.8* 8.8*    343 312       Recent Labs   Lab 04/17/25 2155 04/17/25  1836 04/17/25  1648 04/17/25  0925 04/17/25  0838 04/16/25  0818 04/16/25  0611 04/15/25  2150 04/15/25  0613   NA  --   --   --   --  142  --  140  --  141   POTASSIUM  --   --   --   --  4.3  --  3.8  --  3.4   CHLORIDE  --   --   --   --  109*  --  108*  --  107   CO2  --   --   --   --  24  --  22  --  23   BUN  --   --   --   --  37.3*  --  36.2*  --  26.6*   CR  --   --   --   --  1.65*  --  1.68*  --  1.69*   * 302* 270*   < > 118*   < > 166*   < > 103*   KEILY  --   --   --   --  8.8  --  8.5*  --  8.4*    < > = values in this interval not displayed.       Imaging:  CT Abdomen Pelvis w/o Contrast    Result Date: 4/16/2025  EXAM: CT abdomen and pelvis without intravenous contrast. 4/16/2025 12:12 PM HISTORY: suprapubic pain with recurrent utis   TECHNIQUE: Helical acquisition of image data was performed for the abdomen and pelvis without intravenous contrast. Multiplanar reconstructions were performed and reviewed. CONTRAST: None. COMPARISON: CT abdomen/pelvis 3/8/2025, CT chest, abdomen and pelvis 1/22/2025 FINDINGS: Lower thorax: Small bilateral pleural effusions with adjacent atelectasis. Similar degree of right chronic calcified opacities in the right greater than left lower lobe. Interlobular septal thickening at the bases. Reticular opacities  peripherally in the lingula and right middle lobe. Aortic and mitral annuli calcifications. Multivessel coronary artery atherosclerotic calcifications. Liver: Postsurgical changes of partial liver transplantation. Unchanged curvilinear hypodensity in the inferior right lobe. Gallbladder/biliary tree: The common bile duct is not dilated. Status post cholecystectomy. Pancreas: Diffuse fatty atrophic changes without ductal dilatation. Stable 1.2 cm likely cyst of the pancreatic head. Spleen: The spleen is not enlarged. Adrenal glands: No adrenal nodules. Kidneys/ureters: Nonspecific bilateral perinephric fat stranding. Atrophic changes bilaterally. Left nephrolithiasis measuring up to 4 mm, versus vascular calcification bilaterally. No hydronephrosis. Unchanged indeterminate density exophytic left-sided renal cysts. Stomach: Unremarkable. Bladder/pelvic organs: Distended bladder within normal limits. Bowel/mesentery: No dilated loops of small bowel or colon. The appendix is surgically absent. Colonic diverticulosis. There is trace free fluid about the sigmoid colon and mild presacral edema. Increased size of a soft tissue/fluid density posterior to the sigmoid colon containing a focus of air (5/343), measuring 2.3 x 1.7 cm, previously 1.6 x 1.0 cm which previously demonstrated a focus of air. Peritoneum/retroperitoneum: No extraluminal bowel gas. No free fluid in the abdomen or pelvis. Lymph nodes: No lymphadenopathy. Major vessels: No aneurysmal dilatation. Scattered atherosclerotic calcifications. Soft tissues: No acute soft tissue abnormality. Osseous structures: Degenerative changes of the spine. No acute or suspicious osseous abnormality.     IMPRESSION: 1.  New fat stranding and mesenteric edema about the distal sigmoid colon in the midline, nonspecific, but may represent diverticulitis. Suspected contained microperforation with small fluid collection containing tiny focus of air posterior to the sigmoid colon.  2.  Distended bladder within normal limits. 3.  Unchanged fluid density 1.2 cm lesion in the region of the pancreatic head. 4.  Stable postsurgical changes of liver transplant and curvilinear hypodensity in the inferior right lobe. 5.  Other chronic CT findings including small bilateral pleural effusions and right greater than left basilar calcified opacities are unchanged. I have personally reviewed the examination and initial interpretation and I agree with the findings. HERI HELTON MD   SYSTEM ID:  [id number]       Assessment/Plan:   76 yo w/ PMHx of primary biliary cirrhosis s/p liver transplant in 2002, paroxysmal atrial fibrillation on apixaban, HTN, T2DM, CKD, HF, hx of CVA, hypothyroidism, recurrent ESBL UTIs with recent E. Faecalis bacteremia admission last month on IV ertapenem via PICC admitted for generalized weakness likely 2/2 infection who we are consulted for acute diverticulitis. Based on imaging and clinical findings, this is an episode of uncomplicated diverticulitis with possible contained microperforation.     -Continue antibiotics  -Okay to advance to FLD given improvement in pain     Seen, examined, and discussed with chief resident, who will discuss with staff.  - - - - - - - - - - - - - - - - - -  Thomas Silva MD  PGY-1 Surgery Resident       Addendum:   Doing well this afternoon.  Passed gas.  OK to adv to regular diet as tolerated.  Appreciate Medicine and Transplant ID management.  As per standard of care, we do recommend a colonoscopy in about 8 weeks but will defer to patient.  Can follow up in clinic as needed.  Discussed with Dr. Luz.  CRS will sign off at this time.  Please call/page with questions/concerns.

## 2025-04-18 NOTE — PROGRESS NOTES
[facility]    Progress Note - Medicine Service, JOVANNY TEAM 3       Date of Admission:  4/14/2025    Assessment & Plan     76 yo w/ PMHx of primary biliary cirrhosis s/p liver transplant in 2002, paroxysmal atrial fibrillation on apixaban, HTN, T2DM, CKD, HF, hx of CVA, hypothyroidism, recurrent ESBL UTIs with recent E. Faecalis bacteremia admission last month on IV ertapenem via PICC admitted for generalized weakness likely 2/2 infection    Today:  Continue Ertapenem 1 q24hr  PT/OT recommending TCU, patient is agreeable & SW is aware  CRS consulted for contained bowel perforation; medical management --> upgrade to full liquid diet today  Diclofenac gel/lidocaine patches for chronic shoulder pain  Increased from LDSS to MDSS     #generalized weakness likely 2/2 bacteremia  #recurrent UTIs on IV ertapenem since 3/9/2024  #recent E. Faecalis bacteremia  #Sigmoid microperforation  Afebrile and hemodynamically stable with WBC 14.8 on admit though with reported fevers and lethargy. Given UA improved from prior with some leukocyte esterases, consideration for bacteremia 2/2 PICC line over recurrent UTI/pyelonephritis. Reports suprapubic tenderness. CXR w/ mild pulmonary edema. Recent admission in March 2025 for Klebsiella pneumonia UTI and E. Faecalis bacteremia with recommendations from ID for ertapenem 1g q24h until EOT 4/22. RVP negative. Per pt missed ertapenem from 4/8-4/13. Microperforation noted on CTAP. CRS consulted and recommend medical management.   Abx:   vancomycin: 4/14 - 4/16  ertapenem: 3/9 - 4/8, 4/14 - **  meropenem: 3/8 - 3/9  Cultures:  PICC line blood cultures, NGTD  Urine culture, <10 000 CFU Urogenital louann  Transplant ID consulted, appreciate recs  Continue ertapenem  CRS consult, appreciate recs  Full Liquid Diet  ctm  PT/OT recommending TCU, patient is agreeable & SW is aware    #Liver transplant in 2002 2/2 primary biliary cirrhosis  Follows   James. Has missed 2 days of medication.  PTA sirolimus 1 mg qD  PTA prednisone 10 mg qD  PTA ursodiol 250 mg BID  GI/Liver transplant consult, appreciate recs    #CKD3b w/ moderate proteinuria, at baseline  #HTN  #?heart failure  Baseline Cr. 1.5. History of dialysis at time of liver transplant 23 years ago. Recurrent AKIs and immunosuppressive medication toxicity. Not on SGLT2i 2/2 recurrent UTI. Chart review w/ unclear heart failure history, unconfirmed with EF 60-65% on 3/10/25. Encourage oral intake for now. BNP 1446 elevated from 1 month ago. Given unclear HF history, trace bilateral LE edema, with some pulm edema noted on imaging, consideration for volume overload. However, without orthopnea or clear cough. Will defer diuresis.  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 Wound does not look infected.  CTM    #anemia, chronic  Hgb 9.9 on admit. Stable.  HOLD PTA ferrous sulfate    #hypothyroidism  PTA levothyroxine 175mcg qD     # Chronic Shoulder Pain, Osteoarthritis   - diclofenac gel prn   - lidocaine patches prn    #Constipation   - Fiber qd prn         Diet: Snacks/Supplements Adult: Ensure Clear; With Meals  Snacks/Supplements Adult: Other; clear boost; With Meals  Full Liquid Diet    DVT Prophylaxis: PTA eliquis    Ron Catheter: Not present  Fluids: PO  Lines: None     Cardiac Monitoring: ACTIVE order. Indication: Bradycardias (48 hours)  Code Status: Full Code      Clinically Significant Risk Factors          # Hyperchloremia: Highest Cl = 109 mmol/L in last 2  "days, will monitor as appropriate          # Hypoalbuminemia: Lowest albumin = 2.8 g/dL at 4/15/2025  6:13 AM, will monitor as appropriate     # Hypertension: Noted on problem list           # DMII: A1C = 6.8 % (Ref range: <5.7 %) within past 6 months, PRESENT ON ADMISSION  # Overweight: Estimated body mass index is 26.38 kg/m  as calculated from the following:    Height as of this encounter: 1.702 m (5' 7\").    Weight as of this encounter: 76.4 kg (168 lb 6.4 oz)., PRESENT ON ADMISSION     # Financial/Environmental Concerns: none         Social Drivers of Health   Housing Stability: Low Risk  (4/15/2025)    Housing Stability     Do you have housing? : Yes     Are you worried about losing your housing?: No   Recent Concern: Housing Stability - High Risk (1/22/2025)    Housing Stability     Do you have housing? : No     Are you worried about losing your housing?: No   Tobacco Use: Medium Risk (4/8/2025)    Patient History     Smoking Tobacco Use: Former     Smokeless Tobacco Use: Never   Transportation Needs: Low Risk  (4/15/2025)    Transportation Needs     Within the past 12 months, has lack of transportation kept you from medical appointments, getting your medicines, non-medical meetings or appointments, work, or from getting things that you need?: No   Recent Concern: Transportation Needs - High Risk (1/27/2025)    Transportation Needs     Within the past 12 months, has lack of transportation kept you from medical appointments, getting your medicines, non-medical meetings or appointments, work, or from getting things that you need?: Yes   Physical Activity: Insufficiently Active (11/9/2023)    Received from [employer]    Exercise Vital Sign     Days of Exercise per Week: 5 days     Minutes of Exercise per Session: 10 min         Disposition Plan     Medically Ready for Discharge: Anticipated in 2-4 Days,Tolerating regular diet, TCU placement         The patient's care was discussed with the Attending Physician, " "Dr. Luz .    DEVONTE PACHECO MD  Medicine Service, MAROON TEAM 3  [facility]  Securely message with [employer] (more info)  Text page via [employer] Paging/Directory   See signed in provider for up to date coverage information  ______________________________________________________________________    Interval History   Didn't sleep great overnight, reports she had to be moved around midnight and was awoken in the early morning by a team. Hoping to take a nap later. Not having an abdominal pain at this time. Does note that she hasn't had a bowel movement since 4/15. Uses fiber and would like to try this. Also concerned about her shoulder pain and requesting something to help with pain management.     Physical Exam   Vital Signs: Temp: 98.2  F (36.8  C) Temp src: Axillary BP: 130/54 Pulse: 60   Resp: 16 SpO2: 96 % O2 Device: None (Room air) Oxygen Delivery: 1 LPM  Weight: 168 lbs 6.4 oz    Constitutional: awake, alert, NAD  Eyes: Lids and lashes normal  HENT: NCAT, healing mohs surgery on L cheek  Resp: No increased WOB, CTAB, no w/r/rh  Card: RRR, normal S1 and S2,  no m/r/g  Extrem: Pulses equal throughout, no LE edema  GI: sensitive to palpation \"ticklish\", no tenderness with deep palpation  Skin/integ: no erythema or rash, no jaundice  Neuro: cranial nerves II-XII are grossly intact, uses hearing aids  Neuropsych: alert, answering questions appropriately, interactive, affect full      Medical Decision Making       Please see A&P for additional details of medical decision making.      Data     I have personally reviewed the following data over the past 24 hrs:    7.1  \   8.7 (L)   / 347     141 109 (H) 35.3 (H) /  92   4.2 22 1.40 (H) \     ALT: 49 AST: 45 AP: 89 TBILI: <0.2   ALB: 3.1 (L) TOT PROTEIN: 5.8 (L) LIPASE: N/A       Imaging results reviewed over the past 24 hrs:   No results found for this or any previous visit (from the past 24 hours).  "

## 2025-04-18 NOTE — PROGRESS NOTES
Care Management Follow Up    Length of Stay (days): 4    Expected Discharge Date: 04/21/2025     Concerns to be Addressed: discharge planning     Patient plan of care discussed at interdisciplinary rounds: Yes    Anticipated Discharge Disposition: Home, Home Care (EMILY for home care)     Anticipated Discharge Services: None  Anticipated Discharge DME: None    Patient/family educated on Medicare website which has current facility and service quality ratings: no  Education Provided on the Discharge Plan:    Patient/Family in Agreement with the Plan: yes    Referrals Placed by CM/SW: Homecare, Internal Clinic Care Coordination  Private pay costs discussed: Not applicable    Discussed  Partnership in Safe Discharge Planning  document with patient/family: No     Handoff Completed: Yes, [facility] PCP: Internal handoff referral completed    Additional Information:    Primary team updated the pt will stay here through the weekend.    Next Steps:   [] Follow for discharge planning  [] Final antibiotic plan pending ID rec's  [] Home with home care/home infusion services vs TCU  [] IMM  [x] IHO      Home Resources     Established Home Care  [facility]  P: [phone]  Service: RN     Home Infusion  [facility]  Roz Josue RN Liaison   [phone]  Office [phone]  Service: IV antibiotics, infusion supplies.     Yola Caro RN    7C RN Coordinator  Phone: [phone]  4/18/2025  Units: 7C Med Surg 7401 thru 7418 RNCC     Social Work and Care Management Department   SEARCHABLE in [facility] - search CARE COORDINATOR   [facility] ([id number]) Saturday & Sunday; ([phone]) [facility] Recognized Holidays   Units: 5A Onc 5201 - 5219 RNCC,  5A Onc 5220 thru 5240 RNCC, 5C OFFSERVICE 1099-7989 RNCC & 5C OFF SERVICE 8724-1650 RNCC   Units: 6B Vocera, 6C Card 6401 thru 6420 RNCC, 6C Card 6502 thru 6514 RNCC & 6C Card 6515 thru 6519 RNCC    Units: 7A SOT RNCC Vocera, 7B Med Surg Vocera, & 7C Med Surg 7502 thru  7521 [facility]   Units: 6A Vocera & 4A CVICU Vocera, 4C MICU Vocera, and 4E SICU Vocera     Units: 5 Ortho Vocera & 5 Med Surg Vocera    Units: 6 Med Surg Vocera & 8 Med Surg Vocera

## 2025-04-19 LAB
ALBUMIN SERPL BCG-MCNC: 3.2 G/DL (ref 3.5–5.2)
ALP SERPL-CCNC: 104 U/L (ref 40–150)
ALT SERPL W P-5'-P-CCNC: 75 U/L (ref 0–50)
ANION GAP SERPL CALCULATED.3IONS-SCNC: 9 MMOL/L (ref 7–15)
AST SERPL W P-5'-P-CCNC: 59 U/L (ref 0–45)
BACTERIA BLD CULT: NO GROWTH
BACTERIA BLD CULT: NO GROWTH
BILIRUB SERPL-MCNC: <0.2 MG/DL
BUN SERPL-MCNC: 33.5 MG/DL (ref 8–23)
CALCIUM SERPL-MCNC: 8.8 MG/DL (ref 8.8–10.4)
CHLORIDE SERPL-SCNC: 111 MMOL/L (ref 98–107)
CREAT SERPL-MCNC: 1.32 MG/DL (ref 0.51–0.95)
EGFRCR SERPLBLD CKD-EPI 2021: 42 ML/MIN/1.73M2
ERYTHROCYTE [DISTWIDTH] IN BLOOD BY AUTOMATED COUNT: 17.4 % (ref 10–15)
GLUCOSE BLDC GLUCOMTR-MCNC: 154 MG/DL (ref 70–99)
GLUCOSE BLDC GLUCOMTR-MCNC: 172 MG/DL (ref 70–99)
GLUCOSE BLDC GLUCOMTR-MCNC: 203 MG/DL (ref 70–99)
GLUCOSE BLDC GLUCOMTR-MCNC: 96 MG/DL (ref 70–99)
GLUCOSE SERPL-MCNC: 116 MG/DL (ref 70–99)
HCO3 SERPL-SCNC: 24 MMOL/L (ref 22–29)
HCT VFR BLD AUTO: 29.5 % (ref 35–47)
HGB BLD-MCNC: 8.7 G/DL (ref 11.7–15.7)
MCH RBC QN AUTO: 27 PG (ref 26.5–33)
MCHC RBC AUTO-ENTMCNC: 29.5 G/DL (ref 31.5–36.5)
MCV RBC AUTO: 92 FL (ref 78–100)
PLATELET # BLD AUTO: 383 10E3/UL (ref 150–450)
POTASSIUM SERPL-SCNC: 4.4 MMOL/L (ref 3.4–5.3)
PROT SERPL-MCNC: 5.8 G/DL (ref 6.4–8.3)
RBC # BLD AUTO: 3.22 10E6/UL (ref 3.8–5.2)
SODIUM SERPL-SCNC: 144 MMOL/L (ref 135–145)
WBC # BLD AUTO: 6 10E3/UL (ref 4–11)

## 2025-04-19 PROCEDURE — 250N000013 HC RX MED GY IP 250 OP 250 PS 637

## 2025-04-19 PROCEDURE — 250N000011 HC RX IP 250 OP 636: Mod: JZ

## 2025-04-19 PROCEDURE — 120N000002 HC R&B MED SURG/OB UMMC

## 2025-04-19 PROCEDURE — 250N000012 HC RX MED GY IP 250 OP 636 PS 637: Performed by: NURSE PRACTITIONER

## 2025-04-19 PROCEDURE — 36415 COLL VENOUS BLD VENIPUNCTURE: CPT

## 2025-04-19 PROCEDURE — 250N000013 HC RX MED GY IP 250 OP 250 PS 637: Performed by: INTERNAL MEDICINE

## 2025-04-19 PROCEDURE — 85014 HEMATOCRIT: CPT

## 2025-04-19 PROCEDURE — 250N000012 HC RX MED GY IP 250 OP 636 PS 637

## 2025-04-19 PROCEDURE — 84155 ASSAY OF PROTEIN SERUM: CPT

## 2025-04-19 PROCEDURE — 99232 SBSQ HOSP IP/OBS MODERATE 35: CPT | Mod: GC | Performed by: INTERNAL MEDICINE

## 2025-04-19 RX ORDER — HYDRALAZINE HYDROCHLORIDE 25 MG/1
50 TABLET, FILM COATED ORAL 3 TIMES DAILY
Status: DISCONTINUED | OUTPATIENT
Start: 2025-04-19 | End: 2025-04-23 | Stop reason: HOSPADM

## 2025-04-19 RX ORDER — AMLODIPINE BESYLATE 2.5 MG/1
2.5 TABLET ORAL DAILY
Status: DISCONTINUED | OUTPATIENT
Start: 2025-04-20 | End: 2025-04-23 | Stop reason: HOSPADM

## 2025-04-19 RX ADMIN — SIROLIMUS 1 MG: 1 TABLET, FILM COATED ORAL at 08:37

## 2025-04-19 RX ADMIN — EZETIMIBE 10 MG: 10 TABLET ORAL at 08:37

## 2025-04-19 RX ADMIN — Medication 9 MG: at 08:37

## 2025-04-19 RX ADMIN — URSODIOL 250 MG: 250 TABLET ORAL at 20:29

## 2025-04-19 RX ADMIN — HYDRALAZINE HYDROCHLORIDE 50 MG: 25 TABLET ORAL at 14:09

## 2025-04-19 RX ADMIN — GABAPENTIN 300 MG: 250 SOLUTION ORAL at 22:11

## 2025-04-19 RX ADMIN — ERTAPENEM SODIUM 1 G: 1 INJECTION, POWDER, LYOPHILIZED, FOR SOLUTION INTRAMUSCULAR; INTRAVENOUS at 22:11

## 2025-04-19 RX ADMIN — ACETAMINOPHEN 650 MG: 325 TABLET, FILM COATED ORAL at 08:37

## 2025-04-19 RX ADMIN — METOPROLOL SUCCINATE 25 MG: 25 TABLET, EXTENDED RELEASE ORAL at 08:37

## 2025-04-19 RX ADMIN — OMEGA-3-ACID ETHYL ESTERS 1 G: 1 CAPSULE, LIQUID FILLED ORAL at 20:29

## 2025-04-19 RX ADMIN — APIXABAN 5 MG: 5 TABLET, FILM COATED ORAL at 08:37

## 2025-04-19 RX ADMIN — APIXABAN 5 MG: 5 TABLET, FILM COATED ORAL at 20:29

## 2025-04-19 RX ADMIN — LEVOTHYROXINE SODIUM 175 MCG: 0.03 TABLET ORAL at 08:36

## 2025-04-19 RX ADMIN — URSODIOL 250 MG: 250 TABLET ORAL at 08:36

## 2025-04-19 RX ADMIN — OMEGA-3-ACID ETHYL ESTERS 1 G: 1 CAPSULE, LIQUID FILLED ORAL at 08:36

## 2025-04-19 RX ADMIN — HYDRALAZINE HYDROCHLORIDE 50 MG: 25 TABLET ORAL at 20:28

## 2025-04-19 RX ADMIN — SITAGLIPTIN 50 MG: 50 TABLET, FILM COATED ORAL at 14:10

## 2025-04-19 ASSESSMENT — ACTIVITIES OF DAILY LIVING (ADL)
ADLS_ACUITY_SCORE: 58

## 2025-04-19 NOTE — PLAN OF CARE
Goal Outcome Evaluation:      Plan of Care Reviewed With: patient    Overall Patient Progress: improvingOverall Patient Progress: improving    Outcome Evaluation: awaiting TCU placement    Assumed care [id number]. A&O and able to make needs known. VSS on RA. Tylenol given x1 this morning for pain. MD restarted BP meds. Pt having good appetite today. Still no BM, but pt reports +gas and denies nausea, abdominal pain. Pt up to bedside commode with good UO. Ambulated in halls today with staff. Awaiting TCU placement. Continue with plan of care.

## 2025-04-19 NOTE — PLAN OF CARE
Goal Outcome Evaluation:      Plan of Care Reviewed With: patient    Overall Patient Progress: improvingOverall Patient Progress: improving    Outcome Evaluation: Pt is alert and oriented x 4, vitally stable on room air during the day, but desats to 85% when sleeping at noc and had to be put on 2L O2 via NC. Continues on tele monitoring with rhythm show. Left check incision site open to air. Pt up with Ax1 to bedside commode. Using purewick at night. Tolerating regular diet. Continue with plan of care.

## 2025-04-19 NOTE — PROGRESS NOTES
Care Management Follow Up    Length of Stay (days): 5    Expected Discharge Date: 04/20/2025     Concerns to be Addressed: discharge planning     Patient plan of care discussed at interdisciplinary rounds: Yes    Anticipated Discharge Disposition: Transitional Care  Anticipated Discharge Services: None  Anticipated Discharge DME: None    Patient/family educated on Medicare website which has current facility and service quality ratings: yes  Education Provided on the Discharge Plan: Yes  Patient/Family in Agreement with the Plan: yes    Referrals Placed by CM/SW:     Pending  [facility]  [address] [place] [address]  P: [phone]  F: [phone]  4/19: Initial SNF referral sent via [facility]  [address]   [place] [address]  P: [phone]  F: [phone]  4/19: Initial SNF referral sent via [facility]  [address] [address]  P: [phone]  F: [phone]  4/19: Initial SNF referral sent via [facility]  [address] [facility] [address]  P: [phone]  F: [phone]  4/19: Initial SNF referral sent via [facility]  - [facility]  [address]  [place] [address]  Liaison: Maria Luisa Mcclendon  P: [phone]  F: [phone]  [email or web address]  4/19: Initial SNF referral sent via [facility]: Bed not available     Private pay costs discussed: Not applicable    Discussed  Partnership in Safe Discharge Planning  document with patient/family: Yes: Document provided     Handoff Completed: Yes, [facility] PCP: Internal handoff referral completed    Additional Information:  Patient was deemed to be med ready. Discharge is pending TCU placement.     @1110 SW attempted to meet with the patient at bedside to discuss additional TCU choices. The patient was sleeping soundly at this time and did  not wake to SW's voice. SW left a list of facilities near the patient's home address from the Medicare.gov website requesting patient pick 5-10 facilities.   NADEEM will plan to further discuss with the patient later in the day when she is awake.     @1346 SW met with the patient at bedside to further discuss the need for additional TCU referrals. Patient had already looked at the list and provided SW with an additional 5 choices. Patient reported when it is time to discharge one of her children will be able to provide transportation. Patient asked if SW would hear back from facilities this weekend. SW reported likely not due to it being a weekend and the holiday tomorrow. Patient is hoping for an update regarding placement on Monday 4/21/25.    SW sent additional TCU referrals    Next Steps: NADEEM will plan to follow up with pending referrals early next week and update the patient.  Prior to discharge PAS and IMM will need to be completed.  NADEEM will continue to follow for discharge planning and placement.     Guillermo CHEW  4/19/2025       Social Work and Care Management Department     SEARCHABLE in [facility] - search SOCIAL WORK     [place] ([phone]) Saturday and Sunday     Units: 4A Vocera, 4C Vocera, & 4E Vocera        Units: 5A 4438-3055 Vocera, 5A 2864-5836 Vocera , BMT SW 1 BMT SW 2, BMT SW 3 & BMT SW 4  5C Off Service 5401 - 5416  5C Off Service 2740-0885     Units: 6A Vocera & 6B Vocera      Units: 6C Vocera     Units: 7A Vocera & 7B Vocera      Units: 7C Med Surg 7401 thru 7418 and 7C Med Surg 7502 thru 7521      Unit: [place] ED Vocera & [place] Obs Vocera      After hours Vocera [facility] and After Hours Vocera [place]

## 2025-04-19 NOTE — PROGRESS NOTES
[facility]    Progress Note - Medicine Service, JOVANNY TEAM 3       Date of Admission:  4/14/2025    Assessment & Plan   74 yo w/ PMHx of primary biliary cirrhosis s/p liver transplant in 2002, paroxysmal atrial fibrillation on apixaban, HTN, T2DM, CKD, HF, hx of CVA, hypothyroidism, recurrent ESBL UTIs with recent E. Faecalis bacteremia admission last month on IV ertapenem via PICC admitted for generalized weakness likely 2/2 infection     Today:  - continue ertapenem  - notified SW medically ready for TCU   - resume pta antihypertensives  - resume pta metformin and linagliptin  - ID signed off, follow 4/18 ID note for discharge needs    # sepsis 2/2 intraabdominal infection - resolved  # recurrent UTIs on IV ertapenem since 3/9/2024  # recent E. Faecalis bacteremia  # Sigmoid microperforation  Afebrile and hemodynamically stable with WBC 14.8 on admit though with reported fevers and lethargy. Given UA improved from prior with some leukocyte esterases, consideration for bacteremia 2/2 PICC line over recurrent UTI/pyelonephritis. Reports suprapubic tenderness. CXR w/ mild pulmonary edema. Recent admission in March 2025 for Klebsiella pneumonia UTI and E. Faecalis bacteremia with recommendations from ID for ertapenem 1g q24h until EOT 4/22. RVP negative. Per pt missed ertapenem from 4/8-4/13. Microperforation noted on CTAP. CRS consulted and recommend medical management.   Abx:   vancomycin: 4/14 - 4/16  ertapenem: 3/9 - 4/8, 4/14 - **  meropenem: 3/8 - 3/9  Cultures:  PICC line blood cultures, NGTD  Urine culture, <10 000 CFU Urogenital louann  Transplant ID consulted, appreciate recs  Continue ertapenem  CRS consult, appreciate recs  Full Liquid Diet  ctm  PT/OT recommending TCU, patient is agreeable & SW is aware     #Liver transplant in 2002 2/2 primary biliary cirrhosis  Follows Dr. Ramirez. Has missed 2 days of medication.  PTA sirolimus 1 mg qD  PTA prednisone 10  mg qD  PTA ursodiol 250 mg BID  GI/Liver transplant consult, appreciate recs     # Mild Obstructive Sleep Apnea   # Nocturnal Hypoxemia   Sleep study in 2018 showing mild RASHIDA with recommendation to start CPAP. IT does not appear that there was further follow up and not using CPAP. Will discuss with patient to see if this was personal preference or would like referral back to sleep medicine as she has been placed on night time oxygen for desaturations during sleep during this hospital stay.   - consider sleep medicine referral    #CKD3b w/ moderate proteinuria, at baseline  #HTN  #?heart failure  Baseline Cr. 1.5. History of dialysis at time of liver transplant 23 years ago. Recurrent AKIs and immunosuppressive medication toxicity. Not on SGLT2i 2/2 recurrent UTI. Chart review w/ unclear heart failure history, unconfirmed with EF 60-65% on 3/10/25. Encourage oral intake for now. BNP 1446 elevated from 1 month ago. Given unclear HF history, trace bilateral LE edema, with some pulm edema noted on imaging, consideration for volume overload. However, without orthopnea or clear cough. Will defer diuresis.  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 Wound does not look infected.  CTM     #anemia, chronic  Hgb 9.9 on admit. Stable.  HOLD PTA ferrous sulfate     #hypothyroidism  PTA levothyroxine 175mcg qD      # Chronic Shoulder Pain, Osteoarthritis   - diclofenac gel prn   - lidocaine patches prn     #Constipation   -  "Fiber qd prn                Diet: Snacks/Supplements Adult: Ensure Clear; With Meals  Snacks/Supplements Adult: Other; clear boost; With Meals  Regular Diet Adult    DVT Prophylaxis: pta eliquis  Ron Catheter: Not present  Fluids: po intake  Lines: None     Cardiac Monitoring: None  Code Status: Full Code      Clinically Significant Risk Factors          # Hyperchloremia: Highest Cl = 111 mmol/L in last 2 days, will monitor as appropriate          # Hypoalbuminemia: Lowest albumin = 2.8 g/dL at 4/15/2025  6:13 AM, will monitor as appropriate     # Hypertension: Noted on problem list           # DMII: A1C = 6.8 % (Ref range: <5.7 %) within past 6 months   # Overweight: Estimated body mass index is 26.38 kg/m  as calculated from the following:    Height as of this encounter: 1.702 m (5' 7\").    Weight as of this encounter: 76.4 kg (168 lb 6.4 oz).      # Financial/Environmental Concerns: none         Social Drivers of Health   Housing Stability: Low Risk  (4/15/2025)    Housing Stability     Do you have housing? : Yes     Are you worried about losing your housing?: No   Recent Concern: Housing Stability - High Risk (1/22/2025)    Housing Stability     Do you have housing? : No     Are you worried about losing your housing?: No   Tobacco Use: Medium Risk (4/8/2025)    Patient History     Smoking Tobacco Use: Former     Smokeless Tobacco Use: Never   Transportation Needs: Low Risk  (4/15/2025)    Transportation Needs     Within the past 12 months, has lack of transportation kept you from medical appointments, getting your medicines, non-medical meetings or appointments, work, or from getting things that you need?: No   Recent Concern: Transportation Needs - High Risk (1/27/2025)    Transportation Needs     Within the past 12 months, has lack of transportation kept you from medical appointments, getting your medicines, non-medical meetings or appointments, work, or from getting things that you need?: Yes   Physical " Activity: Insufficiently Active (11/9/2023)    Received from [employer]    Exercise Vital Sign     Days of Exercise per Week: 5 days     Minutes of Exercise per Session: 10 min         Disposition Plan     Medically Ready for Discharge: Ready Now         The patient's care was discussed with the Attending Physician, Dr. Luz .    DEVONTE PACHECO MD  [facility], [facility]  [facility]  Securely message with [employer] (more info)  Text page via [employer] Paging/Directory   See signed in provider for up to date coverage information  ______________________________________________________________________    Interval History   Eating an omelet this morning, her favorite meal of the day. Asks good questions about discharge planning, ready for TCU. Had one episode of left flank pain after moving around, self resolving.     Physical Exam   Vital Signs: Temp: 97.7  F (36.5  C) Temp src: Oral BP: (!) 155/68 Pulse: 57   Resp: (!) 37 SpO2: 100 % O2 Device: Nasal cannula Oxygen Delivery: 2 LPM  Weight: 168 lbs 6.4 oz    Constitutional: awake, alert, NAD  Eyes: Lids and lashes normal  HENT: NCAT, healing mohs surgery on L cheek  Resp: No increased WOB, CTAB, no w/r/rh  Card: RRR, normal S1 and S2,  no m/r/g  Extrem: Pulses equal throughout, no LE edema  GI: sensitive to palpation, nontender  Skin/integ: no erythema or rash, no jaundice  Neuro: cranial nerves II-XII are grossly intact, uses hearing aids  Neuropsych: alert, answering questions appropriately, interactive, affect full    Medical Decision Making       Please see A&P for additional details of medical decision making.      Data     I have personally reviewed the following data over the past 24 hrs:    6.0  \   8.7 (L)   / 383     144 111 (H) 33.5 (H) /  96   4.4 24 1.32 (H) \     ALT: 75 (H) AST: 59 (H) AP: 104 TBILI: <0.2   ALB: 3.2 (L) TOT PROTEIN: 5.8 (L) LIPASE: N/A

## 2025-04-20 ENCOUNTER — APPOINTMENT (OUTPATIENT)
Dept: OCCUPATIONAL THERAPY | Facility: CLINIC | Age: 75
End: 2025-04-20
Payer: MEDICARE

## 2025-04-20 LAB
ALBUMIN SERPL BCG-MCNC: 3.2 G/DL (ref 3.5–5.2)
ALP SERPL-CCNC: 97 U/L (ref 40–150)
ALT SERPL W P-5'-P-CCNC: 50 U/L (ref 0–50)
ANION GAP SERPL CALCULATED.3IONS-SCNC: 10 MMOL/L (ref 7–15)
AST SERPL W P-5'-P-CCNC: 27 U/L (ref 0–45)
BILIRUB SERPL-MCNC: <0.2 MG/DL
BUN SERPL-MCNC: 35.6 MG/DL (ref 8–23)
CALCIUM SERPL-MCNC: 8.8 MG/DL (ref 8.8–10.4)
CHLORIDE SERPL-SCNC: 110 MMOL/L (ref 98–107)
CREAT SERPL-MCNC: 1.33 MG/DL (ref 0.51–0.95)
EGFRCR SERPLBLD CKD-EPI 2021: 41 ML/MIN/1.73M2
ERYTHROCYTE [DISTWIDTH] IN BLOOD BY AUTOMATED COUNT: 17.5 % (ref 10–15)
GLUCOSE BLDC GLUCOMTR-MCNC: 107 MG/DL (ref 70–99)
GLUCOSE BLDC GLUCOMTR-MCNC: 127 MG/DL (ref 70–99)
GLUCOSE BLDC GLUCOMTR-MCNC: 151 MG/DL (ref 70–99)
GLUCOSE BLDC GLUCOMTR-MCNC: 162 MG/DL (ref 70–99)
GLUCOSE BLDC GLUCOMTR-MCNC: 180 MG/DL (ref 70–99)
GLUCOSE SERPL-MCNC: 113 MG/DL (ref 70–99)
HCO3 SERPL-SCNC: 23 MMOL/L (ref 22–29)
HCT VFR BLD AUTO: 28.5 % (ref 35–47)
HGB BLD-MCNC: 8.6 G/DL (ref 11.7–15.7)
MCH RBC QN AUTO: 26.4 PG (ref 26.5–33)
MCHC RBC AUTO-ENTMCNC: 30.2 G/DL (ref 31.5–36.5)
MCV RBC AUTO: 87 FL (ref 78–100)
PLATELET # BLD AUTO: 401 10E3/UL (ref 150–450)
POTASSIUM SERPL-SCNC: 4.2 MMOL/L (ref 3.4–5.3)
PROT SERPL-MCNC: 5.8 G/DL (ref 6.4–8.3)
RBC # BLD AUTO: 3.26 10E6/UL (ref 3.8–5.2)
SODIUM SERPL-SCNC: 143 MMOL/L (ref 135–145)
WBC # BLD AUTO: 8.3 10E3/UL (ref 4–11)

## 2025-04-20 PROCEDURE — 97530 THERAPEUTIC ACTIVITIES: CPT | Mod: GO

## 2025-04-20 PROCEDURE — 250N000011 HC RX IP 250 OP 636: Mod: JZ

## 2025-04-20 PROCEDURE — 99232 SBSQ HOSP IP/OBS MODERATE 35: CPT | Mod: GC | Performed by: INTERNAL MEDICINE

## 2025-04-20 PROCEDURE — 250N000012 HC RX MED GY IP 250 OP 636 PS 637

## 2025-04-20 PROCEDURE — 36415 COLL VENOUS BLD VENIPUNCTURE: CPT

## 2025-04-20 PROCEDURE — 82947 ASSAY GLUCOSE BLOOD QUANT: CPT

## 2025-04-20 PROCEDURE — 250N000012 HC RX MED GY IP 250 OP 636 PS 637: Performed by: NURSE PRACTITIONER

## 2025-04-20 PROCEDURE — 120N000002 HC R&B MED SURG/OB UMMC

## 2025-04-20 PROCEDURE — 85018 HEMOGLOBIN: CPT

## 2025-04-20 PROCEDURE — 97535 SELF CARE MNGMENT TRAINING: CPT | Mod: GO

## 2025-04-20 PROCEDURE — 250N000013 HC RX MED GY IP 250 OP 250 PS 637

## 2025-04-20 PROCEDURE — 250N000013 HC RX MED GY IP 250 OP 250 PS 637: Performed by: INTERNAL MEDICINE

## 2025-04-20 PROCEDURE — 250N000011 HC RX IP 250 OP 636

## 2025-04-20 RX ORDER — LIDOCAINE 4 G/G
1 PATCH TOPICAL DAILY PRN
Status: DISCONTINUED | OUTPATIENT
Start: 2025-04-21 | End: 2025-04-23 | Stop reason: HOSPADM

## 2025-04-20 RX ORDER — LIDOCAINE 4 G/G
1 PATCH TOPICAL ONCE
Status: COMPLETED | OUTPATIENT
Start: 2025-04-20 | End: 2025-04-21

## 2025-04-20 RX ADMIN — URSODIOL 250 MG: 250 TABLET ORAL at 21:26

## 2025-04-20 RX ADMIN — DIPHENHYDRAMINE HYDROCHLORIDE 25 MG: 50 INJECTION, SOLUTION INTRAMUSCULAR; INTRAVENOUS at 22:33

## 2025-04-20 RX ADMIN — APIXABAN 5 MG: 5 TABLET, FILM COATED ORAL at 09:54

## 2025-04-20 RX ADMIN — OMEGA-3-ACID ETHYL ESTERS 1 G: 1 CAPSULE, LIQUID FILLED ORAL at 09:54

## 2025-04-20 RX ADMIN — METOPROLOL SUCCINATE 25 MG: 25 TABLET, EXTENDED RELEASE ORAL at 09:54

## 2025-04-20 RX ADMIN — ACETAMINOPHEN 650 MG: 325 TABLET, FILM COATED ORAL at 02:17

## 2025-04-20 RX ADMIN — SIROLIMUS 1 MG: 1 TABLET, FILM COATED ORAL at 09:55

## 2025-04-20 RX ADMIN — EZETIMIBE 10 MG: 10 TABLET ORAL at 09:54

## 2025-04-20 RX ADMIN — HYDRALAZINE HYDROCHLORIDE 50 MG: 25 TABLET ORAL at 09:54

## 2025-04-20 RX ADMIN — POLYETHYLENE GLYCOL 3350 17 G: 17 POWDER, FOR SOLUTION ORAL at 09:57

## 2025-04-20 RX ADMIN — APIXABAN 5 MG: 5 TABLET, FILM COATED ORAL at 21:26

## 2025-04-20 RX ADMIN — SITAGLIPTIN 50 MG: 50 TABLET, FILM COATED ORAL at 09:54

## 2025-04-20 RX ADMIN — ERTAPENEM SODIUM 1 G: 1 INJECTION, POWDER, LYOPHILIZED, FOR SOLUTION INTRAMUSCULAR; INTRAVENOUS at 22:34

## 2025-04-20 RX ADMIN — HYDRALAZINE HYDROCHLORIDE 50 MG: 25 TABLET ORAL at 21:26

## 2025-04-20 RX ADMIN — HYDRALAZINE HYDROCHLORIDE 50 MG: 25 TABLET ORAL at 15:05

## 2025-04-20 RX ADMIN — LEVOTHYROXINE SODIUM 175 MCG: 0.03 TABLET ORAL at 09:54

## 2025-04-20 RX ADMIN — OMEGA-3-ACID ETHYL ESTERS 1 G: 1 CAPSULE, LIQUID FILLED ORAL at 21:26

## 2025-04-20 RX ADMIN — GABAPENTIN 300 MG: 250 SOLUTION ORAL at 21:26

## 2025-04-20 RX ADMIN — Medication 9 MG: at 09:55

## 2025-04-20 RX ADMIN — AMLODIPINE BESYLATE 2.5 MG: 2.5 TABLET ORAL at 09:54

## 2025-04-20 RX ADMIN — URSODIOL 250 MG: 250 TABLET ORAL at 09:55

## 2025-04-20 ASSESSMENT — ACTIVITIES OF DAILY LIVING (ADL)
ADLS_ACUITY_SCORE: 58

## 2025-04-20 NOTE — PLAN OF CARE
Goal Outcome Evaluation:      Plan of Care Reviewed With: patient    Overall Patient Progress: no change    Outcome Evaluation: Pt A&O. Bed alarm on.  Facial incision site open to air.  Family at bedside today.  Pt worked with Occupational therapy.  Able to make needs known, call light within reach.  Medically ready for TCU.

## 2025-04-20 NOTE — PROGRESS NOTES
[place]    Progress Note - Medicine Service, JOVANNY TEAM 3       Date of Admission:  4/14/2025    Assessment & Plan   74 yo w/ PMHx of primary biliary cirrhosis s/p liver transplant in 2002, paroxysmal atrial fibrillation on apixaban, HTN, T2DM, CKD, HF, hx of CVA, hypothyroidism, recurrent ESBL UTIs with recent E. Faecalis bacteremia admission last month on IV ertapenem via PICC admitted for generalized weakness likely 2/2 infection     Today:  - continue ertapenem  - medically ready for TCU, pending acceptance  - discontinue labs  - lidocaine patch/voltaren gel for back pain      # sepsis 2/2 intraabdominal infection - resolved  # recurrent UTIs on IV ertapenem since 3/9/2024  # recent E. Faecalis bacteremia  # Sigmoid microperforation  Afebrile and hemodynamically stable with WBC 14.8 on admit though with reported fevers and lethargy. Given UA improved from prior with some leukocyte esterases, consideration for bacteremia 2/2 PICC line over recurrent UTI/pyelonephritis. Reports suprapubic tenderness. CXR w/ mild pulmonary edema. Recent admission in March 2025 for Klebsiella pneumonia UTI and E. Faecalis bacteremia with recommendations from ID for ertapenem 1g q24h until EOT 4/22. RVP negative. Per pt missed ertapenem from 4/8-4/13. Microperforation noted on CTAP. CRS consulted and recommend medical management.   Abx:   vancomycin: 4/14 - 4/16  ertapenem: 3/9 - 4/8, 4/14 - **  meropenem: 3/8 - 3/9  Cultures:  PICC line blood cultures, NGTD  Urine culture, <10 000 CFU Urogenital louann  Transplant ID consulted, appreciate recs  Continue ertapenem  CRS consult, appreciate recs  Full Liquid Diet  ctm  PT/OT recommending TCU, patient is agreeable & SW is aware     #Liver transplant in 2002 2/2 primary biliary cirrhosis  Follows Dr. Ramirez. Has missed 2 days of medication.  PTA sirolimus 1 mg qD  PTA prednisone 10 mg qD  PTA ursodiol 250 mg BID  GI/Liver transplant  consult, appreciate recs     # Mild Obstructive Sleep Apnea   # Nocturnal Hypoxemia   Sleep study in 2018 showing mild RASHIDA with recommendation to start CPAP. IT does not appear that there was further follow up and not using CPAP. Will discuss with patient to see if this was personal preference or would like referral back to sleep medicine as she has been placed on night time oxygen for desaturations during sleep during this hospital stay.   - consider sleep medicine referral    #CKD3b w/ moderate proteinuria, at baseline  #HTN  #?heart failure  Baseline Cr. 1.5. History of dialysis at time of liver transplant 23 years ago. Recurrent AKIs and immunosuppressive medication toxicity. Not on SGLT2i 2/2 recurrent UTI. Chart review w/ unclear heart failure history, unconfirmed with EF 60-65% on 3/10/25. Encourage oral intake for now. BNP 1446 elevated from 1 month ago. Given unclear HF history, trace bilateral LE edema, with some pulm edema noted on imaging, consideration for volume overload. However, without orthopnea or clear cough. Will defer diuresis.  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 Wound does not look infected.  CTM     #anemia, chronic  Hgb 9.9 on admit. Stable.  HOLD PTA ferrous sulfate     #hypothyroidism  PTA levothyroxine 175mcg qD      # Chronic Shoulder Pain, Osteoarthritis   # Lower back pain  - diclofenac gel prn   - lidocaine patches prn     #Constipation   - Fiber qd prn                Diet:  "Snacks/Supplements Adult: Ensure Clear; With Meals  Snacks/Supplements Adult: Other; clear boost; With Meals  Regular Diet Adult    DVT Prophylaxis: pta eliquis  Ron Catheter: Not present  Fluids: po intake  Lines: None     Cardiac Monitoring: None  Code Status: Full Code      Clinically Significant Risk Factors          # Hyperchloremia: Highest Cl = 111 mmol/L in last 2 days, will monitor as appropriate          # Hypoalbuminemia: Lowest albumin = 2.8 g/dL at 4/15/2025  6:13 AM, will monitor as appropriate     # Hypertension: Noted on problem list           # DMII: A1C = 6.8 % (Ref range: <5.7 %) within past 6 months   # Overweight: Estimated body mass index is 26.38 kg/m  as calculated from the following:    Height as of this encounter: 1.702 m (5' 7\").    Weight as of this encounter: 76.4 kg (168 lb 6.4 oz).        # Financial/Environmental Concerns: none         Social Drivers of Health   Housing Stability: Low Risk  (4/15/2025)    Housing Stability     Do you have housing? : Yes     Are you worried about losing your housing?: No   Recent Concern: Housing Stability - High Risk (1/22/2025)    Housing Stability     Do you have housing? : No     Are you worried about losing your housing?: No   Tobacco Use: Medium Risk (4/8/2025)    Patient History     Smoking Tobacco Use: Former     Smokeless Tobacco Use: Never   Transportation Needs: Low Risk  (4/15/2025)    Transportation Needs     Within the past 12 months, has lack of transportation kept you from medical appointments, getting your medicines, non-medical meetings or appointments, work, or from getting things that you need?: No   Recent Concern: Transportation Needs - High Risk (1/27/2025)    Transportation Needs     Within the past 12 months, has lack of transportation kept you from medical appointments, getting your medicines, non-medical meetings or appointments, work, or from getting things that you need?: Yes   Physical Activity: Insufficiently Active " (11/9/2023)    [facility] from [facility]    Exercise Vital Sign     Days of Exercise per Week: 5 days     Minutes of Exercise per Session: 10 min         Disposition Plan     Medically Ready for Discharge: Ready Now         The patient's care was discussed with the Attending Physician, Dr. Luz .    Gentry Umanzor MD  Medicine Service, [facility] TEAM 3  [facility]  Securely message with [employer] (more info)  Text page via [employer] Paging/Directory   See signed in provider for up to date coverage information  ______________________________________________________________________    Interval History   Eating well this am, did have some back pain over the night and ok to try topical medications.    Physical Exam   Vital Signs: Temp: 98.4  F (36.9  C) Temp src: Oral BP: 129/78 Pulse: 89   Resp: 18 SpO2: 100 % O2 Device: None (Room air)    Weight: 168 lbs 6.4 oz    Constitutional: awake, alert, NAD  Eyes: Lids and lashes normal  HENT: NCAT, healing mohs surgery on L cheek  Resp: No increased WOB, CTAB, no w/r/rh  Card: RRR, normal S1 and S2,  no m/r/g  Extrem: Pulses equal throughout, no LE edema  GI: NTTP  Neuro: cranial nerves II-XII are grossly intact, uses hearing aids  Neuropsych: alert, answering questions appropriately, interactive, affect full    Medical Decision Making       Please see A&P for additional details of medical decision making.      Data     I have personally reviewed the following data over the past 24 hrs:    8.3  \   8.6 (L)   / 401     143 110 (H) 35.6 (H) /  113 (H)   4.2 23 1.33 (H) \     ALT: 50 AST: 27 AP: 97 TBILI: <0.2   ALB: 3.2 (L) TOT PROTEIN: 5.8 (L) LIPASE: N/A

## 2025-04-20 NOTE — PLAN OF CARE
Goal Outcome Evaluation:      Plan of Care Reviewed With: patient    Overall Patient Progress: improvingOverall Patient Progress: improving    Outcome Evaluation: Pt is alert and oriented x 4, vitally stable on 2L O2 over noc. Back pain controlled with Tylenol x 1 this shift. Calm, pleasant, and cooperative with cares. Left check incision site open to air. Pt up with Ax1 + 4WW to bedside commode, but no BM this shift. LBM 4/15. Uses Purewick over noc. Tolerating regular diet. Continue with plan of care. Awaiting TCU placement.

## 2025-04-21 ENCOUNTER — TELEPHONE (OUTPATIENT)
Dept: DERMATOLOGY | Facility: CLINIC | Age: 75
End: 2025-04-21

## 2025-04-21 ENCOUNTER — APPOINTMENT (OUTPATIENT)
Dept: PHYSICAL THERAPY | Facility: CLINIC | Age: 75
DRG: 872 | End: 2025-04-21
Payer: MEDICARE

## 2025-04-21 ENCOUNTER — APPOINTMENT (OUTPATIENT)
Dept: OCCUPATIONAL THERAPY | Facility: CLINIC | Age: 75
DRG: 872 | End: 2025-04-21
Payer: MEDICARE

## 2025-04-21 LAB
GLUCOSE BLDC GLUCOMTR-MCNC: 108 MG/DL (ref 70–99)
GLUCOSE BLDC GLUCOMTR-MCNC: 138 MG/DL (ref 70–99)
GLUCOSE BLDC GLUCOMTR-MCNC: 192 MG/DL (ref 70–99)
GLUCOSE BLDC GLUCOMTR-MCNC: 87 MG/DL (ref 70–99)

## 2025-04-21 PROCEDURE — 250N000013 HC RX MED GY IP 250 OP 250 PS 637

## 2025-04-21 PROCEDURE — 97530 THERAPEUTIC ACTIVITIES: CPT | Mod: GP

## 2025-04-21 PROCEDURE — 250N000013 HC RX MED GY IP 250 OP 250 PS 637: Performed by: INTERNAL MEDICINE

## 2025-04-21 PROCEDURE — 250N000012 HC RX MED GY IP 250 OP 636 PS 637

## 2025-04-21 PROCEDURE — 250N000012 HC RX MED GY IP 250 OP 636 PS 637: Performed by: NURSE PRACTITIONER

## 2025-04-21 PROCEDURE — 97116 GAIT TRAINING THERAPY: CPT | Mod: GP

## 2025-04-21 PROCEDURE — 250N000011 HC RX IP 250 OP 636

## 2025-04-21 PROCEDURE — 99232 SBSQ HOSP IP/OBS MODERATE 35: CPT | Mod: GC | Performed by: INTERNAL MEDICINE

## 2025-04-21 PROCEDURE — 120N000002 HC R&B MED SURG/OB UMMC

## 2025-04-21 PROCEDURE — 250N000011 HC RX IP 250 OP 636: Mod: JZ

## 2025-04-21 PROCEDURE — 97530 THERAPEUTIC ACTIVITIES: CPT | Mod: GO

## 2025-04-21 PROCEDURE — 97535 SELF CARE MNGMENT TRAINING: CPT | Mod: GO

## 2025-04-21 RX ORDER — LIDOCAINE 40 MG/G
CREAM TOPICAL
DISCHARGE
Start: 2025-04-21 | End: 2025-04-25

## 2025-04-21 RX ORDER — OMEGA-3-ACID ETHYL ESTERS 1 G/1
1 CAPSULE, LIQUID FILLED ORAL 2 TIMES DAILY
DISCHARGE
Start: 2025-04-21

## 2025-04-21 RX ORDER — LIDOCAINE 4 G/G
2 PATCH TOPICAL DAILY PRN
DISCHARGE
Start: 2025-04-21 | End: 2025-04-25

## 2025-04-21 RX ORDER — DEXTROSE MONOHYDRATE 25 G/50ML
25-50 INJECTION, SOLUTION INTRAVENOUS
DISCHARGE
Start: 2025-04-21 | End: 2025-04-25

## 2025-04-21 RX ORDER — ONDANSETRON 4 MG/1
4 TABLET, ORALLY DISINTEGRATING ORAL EVERY 6 HOURS PRN
DISCHARGE
Start: 2025-04-21

## 2025-04-21 RX ORDER — NICOTINE POLACRILEX 4 MG
15-30 LOZENGE BUCCAL
DISCHARGE
Start: 2025-04-21

## 2025-04-21 RX ORDER — LIDOCAINE 4 G/G
1 PATCH TOPICAL DAILY PRN
DISCHARGE
Start: 2025-04-21 | End: 2025-04-25

## 2025-04-21 RX ORDER — LOPERAMIDE HYDROCHLORIDE 2 MG/1
2 CAPSULE ORAL 4 TIMES DAILY PRN
DISCHARGE
Start: 2025-04-21 | End: 2025-04-25

## 2025-04-21 RX ADMIN — GABAPENTIN 300 MG: 250 SOLUTION ORAL at 22:36

## 2025-04-21 RX ADMIN — URSODIOL 250 MG: 250 TABLET ORAL at 09:32

## 2025-04-21 RX ADMIN — Medication 9 MG: at 09:31

## 2025-04-21 RX ADMIN — LEVOTHYROXINE SODIUM 175 MCG: 0.03 TABLET ORAL at 09:33

## 2025-04-21 RX ADMIN — SIROLIMUS 1 MG: 1 TABLET, FILM COATED ORAL at 09:31

## 2025-04-21 RX ADMIN — HYDRALAZINE HYDROCHLORIDE 50 MG: 25 TABLET ORAL at 19:42

## 2025-04-21 RX ADMIN — SITAGLIPTIN 50 MG: 50 TABLET, FILM COATED ORAL at 09:32

## 2025-04-21 RX ADMIN — EZETIMIBE 10 MG: 10 TABLET ORAL at 09:32

## 2025-04-21 RX ADMIN — AMLODIPINE BESYLATE 2.5 MG: 2.5 TABLET ORAL at 09:31

## 2025-04-21 RX ADMIN — OMEGA-3-ACID ETHYL ESTERS 1 G: 1 CAPSULE, LIQUID FILLED ORAL at 19:42

## 2025-04-21 RX ADMIN — ERTAPENEM SODIUM 1 G: 1 INJECTION, POWDER, LYOPHILIZED, FOR SOLUTION INTRAMUSCULAR; INTRAVENOUS at 22:36

## 2025-04-21 RX ADMIN — HYDRALAZINE HYDROCHLORIDE 50 MG: 25 TABLET ORAL at 15:06

## 2025-04-21 RX ADMIN — APIXABAN 5 MG: 5 TABLET, FILM COATED ORAL at 09:31

## 2025-04-21 RX ADMIN — METOPROLOL SUCCINATE 25 MG: 25 TABLET, EXTENDED RELEASE ORAL at 09:33

## 2025-04-21 RX ADMIN — HYDRALAZINE HYDROCHLORIDE 50 MG: 25 TABLET ORAL at 09:33

## 2025-04-21 RX ADMIN — DIPHENHYDRAMINE HYDROCHLORIDE 25 MG: 50 INJECTION, SOLUTION INTRAMUSCULAR; INTRAVENOUS at 22:41

## 2025-04-21 RX ADMIN — URSODIOL 250 MG: 250 TABLET ORAL at 19:41

## 2025-04-21 RX ADMIN — APIXABAN 5 MG: 5 TABLET, FILM COATED ORAL at 19:41

## 2025-04-21 RX ADMIN — OMEGA-3-ACID ETHYL ESTERS 1 G: 1 CAPSULE, LIQUID FILLED ORAL at 09:34

## 2025-04-21 ASSESSMENT — ACTIVITIES OF DAILY LIVING (ADL)
ADLS_ACUITY_SCORE: 60
ADLS_ACUITY_SCORE: 58
ADLS_ACUITY_SCORE: 60
ADLS_ACUITY_SCORE: 58
ADLS_ACUITY_SCORE: 60
ADLS_ACUITY_SCORE: 58
ADLS_ACUITY_SCORE: 60
ADLS_ACUITY_SCORE: 58

## 2025-04-21 NOTE — TELEPHONE ENCOUNTER
Called and spoke with pt.    Luciano May MD Siegfried, Marta; Antionette Painter, NENO; Carrie Kate CMA; Flori Noonan RN  Hi Everyone,    The dermpath on this patient's central debulk upstaged her SCC. It seems like Mickey has had a hard time getting her on the phone. She has a follow up appointment 4/22, may I have it moved to my schedule so we can review the path and discuss a CT and PET scan? Thank you.    AM    Pt unsure when she will be out of the hospital. She will send a photo of her Mohs site via [employer] in the meantime to keep  updated. Pt feels the area is doing well.     Lisa Blevins, Loi  4/21/2025 2:19 PM

## 2025-04-21 NOTE — PROGRESS NOTES
Care Management Follow Up    Length of Stay (days): 7    Expected Discharge Date: 04/22/2025     Concerns to be Addressed: discharge planning     Patient plan of care discussed at interdisciplinary rounds: Yes    Anticipated Discharge Disposition: Transitional Care              Anticipated Discharge Services: None  Anticipated Discharge DME: None    Patient/family educated on Medicare website which has current facility and service quality ratings: yes  Education Provided on the Discharge Plan: Yes  Patient/Family in Agreement with the Plan: yes    Referrals Placed by CM/SW: Homecare, Internal Clinic Care Coordination    Pending:  [facility]  [address] [address]  P: [phone]  F: [phone]  4/19: Initial SNF referral sent via Epic  4/21: SW spoke to Ajay in admissions; they haven't reviewed pt. They will call SW back.     [facility]  [address] [address]  Phone: [phone]  Fax: [phone]  4/21: sent referral via [facility]  [address] [place]  [address]  P: [phone]  F: [phone]   4/21: sent referral via [facility] referral  - [facility]  [address] [address]  Liaison: Maria Luisa Mcclendon  P: [phone]  F: [phone]  [email or web address]  4/19: Initial SNF referral sent via Epic  4/21: NADEEM sent message to Igor Glass inquired about cheek radiation; MD confirms pt will not have raditaion while at the TCU. Maria Luisa states they do not have any space near [place]; NADEEM requested Maria Luisa screen pt for all [place] facilities.          Declined  [facility]: Bed not available  [facility] Care: Medications too costly  [facility]: Bed not available  [facility]: Bed not available    Private pay costs discussed: Not applicable    Discussed   Partnership in Safe Discharge Planning  document with patient/family: No     Handoff Completed: No, handoff not indicated or clinically appropriate    Additional Information:  SW following for discharge planning. Per chart review, pt has been stable for discharge to TCU and referrals have been sent.  SW followed up on referrals- see above.     SW met with patient and reviewed facilities. SW gathered additional choices for TCU ([facility] and [facility] Pt expressed that she is interested in going home if we do not find a TCU bed soon.        Next Steps:   SW or RNCC to connect with pt about TCU referrals and/or discharge home. Pt has home PT/OT/RN; she states if no bed is found for discharge on 4/22/25, then she is discharging home (MD is aware and will talk to her tomorrow AM).      Mariely Botello, [facility]  Acute Care - Float Coverage  Available via [employer]

## 2025-04-21 NOTE — PROGRESS NOTES
[facility]    Progress Note - Medicine Service, JOVANNY TEAM 3       Date of Admission:  4/14/2025    Assessment & Plan   Luz Thompson is a 75 year old female admitted on 4/14/2025. 76 yo w/ PMHx of primary biliary cirrhosis s/p liver transplant in 2002, paroxysmal atrial fibrillation on apixaban, HTN, T2DM, CKD, HF, hx of CVA, hypothyroidism, recurrent ESBL UTIs with recent E. Faecalis bacteremia admission last month on IV ertapenem via PICC admitted for generalized weakness likely 2/2 infection     Today:  - continue ertapenem  - medically ready for TCU, pending acceptance  - no new concerns at this time    # sepsis 2/2 intraabdominal infection - resolved  # recurrent UTIs on IV ertapenem since 3/9/2024  # recent E. Faecalis bacteremia  # Sigmoid microperforation  Afebrile and hemodynamically stable with WBC 14.8 on admit though with reported fevers and lethargy. Given UA improved from prior with some leukocyte esterases, consideration for bacteremia 2/2 PICC line over recurrent UTI/pyelonephritis. Reports suprapubic tenderness. CXR w/ mild pulmonary edema. Recent admission in March 2025 for Klebsiella pneumonia UTI and E. Faecalis bacteremia with recommendations from ID for ertapenem 1g q24h until EOT 4/22. RVP negative. Per pt missed ertapenem from 4/8-4/13. Microperforation noted on CTAP. CRS consulted and recommend medical management.   Abx:   vancomycin: 4/14 - 4/16  ertapenem: 3/9 - 4/8, 4/14 - **  Duration will be determined at outpatient ID clinic follow up based on repeat imaging  meropenem: 3/8 - 3/9  Cultures:  PICC line blood cultures, NGTD  Urine culture, <10 000 CFU Urogenital louann  Transplant ID consulted, appreciate recs  Continue ertapenem  Repeat CT A/P w/o con ~ 4/28 for ID clinic appt.   CRS consult, appreciate recs  PT/OT recommending TCU, patient is agreeable & SW is aware     #Liver transplant in 2002 2/2 primary biliary  cirrhosis  Follows Dr. Ramirez. Has missed 2 days of medication.  PTA sirolimus 1 mg qD  PTA prednisone 10 mg qD  PTA ursodiol 250 mg BID  GI/Liver transplant consult, appreciate recs     # Mild Obstructive Sleep Apnea   # Nocturnal Hypoxemia   Sleep study in 2018 showing mild RASHIDA with recommendation to start CPAP. IT does not appear that there was further follow up and not using CPAP. Will discuss with patient to see if this was personal preference or would like referral back to sleep medicine as she has been placed on night time oxygen for desaturations during sleep during this hospital stay.   - consider sleep medicine referral    #CKD3b w/ moderate proteinuria, at baseline  #HTN  #?heart failure  Baseline Cr. 1.5. History of dialysis at time of liver transplant 23 years ago. Recurrent AKIs and immunosuppressive medication toxicity. Not on SGLT2i 2/2 recurrent UTI. Chart review w/ unclear heart failure history, unconfirmed with EF 60-65% on 3/10/25. Encourage oral intake for now. BNP 1446 elevated from 1 month ago. Given unclear HF history, trace bilateral LE edema, with some pulm edema noted on imaging, consideration for volume overload. However, without orthopnea or clear cough. Will defer diuresis.  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 Wound does not look infected.  - was scheduled for dermatology follow up appointment on 4/22, will need to be rescheduled.      #anemia, chronic  Hgb 9.9 on admit.  "Stable.  HOLD PTA ferrous sulfate     #hypothyroidism  PTA levothyroxine 175mcg qD      # Chronic Shoulder Pain, Osteoarthritis   # Lower back pain  - diclofenac gel prn   - lidocaine patches prn     #Constipation   - Fiber qd prn               Diet: Snacks/Supplements Adult: Ensure Clear; With Meals  Snacks/Supplements Adult: Other; clear boost; With Meals  Regular Diet Adult  Diet    DVT Prophylaxis: DOAC  Ron Catheter: Not present  Fluids: none  Lines: None     Cardiac Monitoring: None  Code Status: Full Code      Clinically Significant Risk Factors          # Hyperchloremia: Highest Cl = 110 mmol/L in last 2 days, will monitor as appropriate          # Hypoalbuminemia: Lowest albumin = 2.8 g/dL at 4/15/2025  6:13 AM, will monitor as appropriate     # Hypertension: Noted on problem list           # DMII: A1C = 6.8 % (Ref range: <5.7 %) within past 6 months   # Overweight: Estimated body mass index is 26.38 kg/m  as calculated from the following:    Height as of this encounter: 1.702 m (5' 7\").    Weight as of this encounter: 76.4 kg (168 lb 6.4 oz).      # Financial/Environmental Concerns: none         Social Drivers of Health   Housing Stability: Low Risk  (4/15/2025)    Housing Stability     Do you have housing? : Yes     Are you worried about losing your housing?: No   Recent Concern: Housing Stability - High Risk (1/22/2025)    Housing Stability     Do you have housing? : No     Are you worried about losing your housing?: No   Tobacco Use: Medium Risk (4/8/2025)    Patient History     Smoking Tobacco Use: Former     Smokeless Tobacco Use: Never   Transportation Needs: Low Risk  (4/15/2025)    Transportation Needs     Within the past 12 months, has lack of transportation kept you from medical appointments, getting your medicines, non-medical meetings or appointments, work, or from getting things that you need?: No   Recent Concern: Transportation Needs - High Risk (1/27/2025)    Transportation Needs     " Within the past 12 months, has lack of transportation kept you from medical appointments, getting your medicines, non-medical meetings or appointments, work, or from getting things that you need?: Yes   Physical Activity: Insufficiently Active (11/9/2023)    Received from [facility]    Exercise Vital Sign     Days of Exercise per Week: 5 days     Minutes of Exercise per Session: 10 min         Disposition Plan     Medically Ready for Discharge: Ready Now         The patient's care was discussed with the Attending Physician, Dr. Luz .    DEVONTE PACHECO MD  Medicine Service, [address]  Securely message with [employer] (more info)  Text page via [facility] Paging/Directory   See signed in provider for up to date coverage information  ______________________________________________________________________    Interval History   No events overnight, awaiting placement. Has concerns about upcoming derm follow up appointment and cardiology appoints.     Physical Exam   Vital Signs: Temp: 97.8  F (36.6  C) Temp src: Oral BP: (!) 176/75     Resp: 16 SpO2: 98 % O2 Device: None (Room air)    Weight: 168 lbs 6.4 oz    Constitutional: awake, alert, NAD  Eyes: Lids and lashes normal  HENT: NCAT, healing mohs surgery on L cheek  Resp: No increased WOB, CTAB, no w/r/rh  Card: RRR, normal S1 and S2,  no m/r/g  Extrem: Pulses equal throughout, no LE edema  GI: NTTP  Neuro: cranial nerves II-XII are grossly intact, uses hearing aids  Neuropsych: alert, answering questions appropriately, interactive, affect full    Medical Decision Making       Please see A&P for additional details of medical decision making.      Data         Imaging results reviewed over the past 24 hrs:   No results found for this or any previous visit (from the past 24 hours).

## 2025-04-21 NOTE — PLAN OF CARE
Goal Outcome Evaluation:      Plan of Care Reviewed With: patient    Overall Patient Progress: no changeOverall Patient Progress: no change    Outcome Evaluation: Pt is alert and oriented x 4, vitally stable on room air. Calm, pleasant, and cooperative with cares. Left check incision site open to air. Pt up with Ax1 + 4WW to bedside commode, but no BM this shift. LBM 4/20. Uses Purewick over noc. Tolerating regular diet. Continue with plan of care. Awaiting TCU placement.

## 2025-04-21 NOTE — PROGRESS NOTES
Brief Dermatology Note:     Dermatology Problem List  # History NMSC,  - L cheek, SCC, s/p Mohs 4/8/25     ------ per patient report, tx in [place]   -A/P: * Dr. May contacted on 04/21/25 regarding further imaging - Patient will miss her follow up appt as she is heading to TCU, if possible - patient should have a head and neck CT with contrast performed and full body PET (this work up can be deferred to outpatient setting but will expedite her ongoing tx if able to be done prior to discharge) Indications for testing are AJCC 8 T3 cutaneous SCC tumor and screening for metastatic disease in the setting of this **   - SCC, R eyebrow, 2024  - SCC, R forehead, 2023  - SCC, L hand, 2021  - SCC, L upper arm, unknown date  # Pruritic papular skin eruption-self-induced, resolved  # Mild atopic dermatitis- hands, thumbs, abdomen, back   - previous: Amlactin, patient discontinued   # Actinic keratoses- right temple, left cheek, s/p LN2  # Onychomycosis-  Oral voriconazole, prescribed by Dr. Montero (IM)   # Drug hypersensitivity reaction- fluconazole  - residual lesions of abdomen and chest   # Atrophe shanna  # Drug allergies   -s/p intradermal testing to several ABX 8/2019, noncontributory  -plan: oral provocation testing     S:   Patient is feeling well and ready for discharge to TCU. Has no concerns or complaints about her MMS site     O:   Well healing V- Y advancement flap, no areas of dehiscince, no underyling erythema, non tender to palpation, patient declines any symptoms           Please feel free to contact dermatology with any other questions.     Patient was discussed with Dr. May 04/21/25.   Rashmi Morales DO 5:22 PM     Staff Physician Comments:   I saw the photos and reviewed the patient with the dermatology resident (Dr. Rashmi Morales) and I agree with the assessment and recommendations as above.     Luciano May DO    Department of Dermatology  [facility]  [facility]: Phone: [phone], Fax:[phone]  [facility]: Phone: [phone], Fax: [phone]

## 2025-04-21 NOTE — PLAN OF CARE
Goal Outcome Evaluation:      Overall Patient Progress: no change    Shift Hours: 0700 - 1900    Assessment:  Body systems that were not at patient's baseline Genitourinary and Safety. Focused body system assessments documented in flowsheets.        Activity     Fall Risk Score: 40   Bed alarm on? No, patient refusing alarm     Activity Assistance Provided: assistance, stand-by      Assistive Device Utilized: walker, gait belt    Pain: 2-3/10, declines PRNs    Labs/RN Managed Protocols: WDL    Lines/Drains: midline SL    Nutrition: tolerating regular diet with good appetite    Goal Outcome Evaluation     Overall Patient Progress: no change       Barriers to Discharge:   Safety: patient still needs TCU placement p/t discharging. Patient reporting to SW/RN that she plans to discharge to home tomorrow 4/22 if no TCU bed is secured. Patient believes she is able to get help from children/home PT/OT. Patient also refused bed alarm this evening d/t urinary urgency and previously being independent in home. Fall risk score at 40, so RN comfortable with patient remaining off bed alarm as long as patient remembers to call for assistance with walker/shoes.

## 2025-04-22 ENCOUNTER — APPOINTMENT (OUTPATIENT)
Dept: PHYSICAL THERAPY | Facility: CLINIC | Age: 75
DRG: 872 | End: 2025-04-22
Payer: MEDICARE

## 2025-04-22 LAB
GLUCOSE BLDC GLUCOMTR-MCNC: 103 MG/DL (ref 70–99)
GLUCOSE BLDC GLUCOMTR-MCNC: 131 MG/DL (ref 70–99)
GLUCOSE BLDC GLUCOMTR-MCNC: 132 MG/DL (ref 70–99)
GLUCOSE BLDC GLUCOMTR-MCNC: 236 MG/DL (ref 70–99)
GLUCOSE BLDC GLUCOMTR-MCNC: 90 MG/DL (ref 70–99)
SIROLIMUS BLD-MCNC: 3.9 UG/L (ref 5–15)
TME LAST DOSE: ABNORMAL H
TME LAST DOSE: ABNORMAL H

## 2025-04-22 PROCEDURE — 250N000012 HC RX MED GY IP 250 OP 636 PS 637: Performed by: NURSE PRACTITIONER

## 2025-04-22 PROCEDURE — 80195 ASSAY OF SIROLIMUS: CPT | Performed by: NURSE PRACTITIONER

## 2025-04-22 PROCEDURE — 97116 GAIT TRAINING THERAPY: CPT | Mod: GP

## 2025-04-22 PROCEDURE — 250N000013 HC RX MED GY IP 250 OP 250 PS 637

## 2025-04-22 PROCEDURE — 120N000002 HC R&B MED SURG/OB UMMC

## 2025-04-22 PROCEDURE — 97110 THERAPEUTIC EXERCISES: CPT | Mod: GP

## 2025-04-22 PROCEDURE — 250N000012 HC RX MED GY IP 250 OP 636 PS 637

## 2025-04-22 PROCEDURE — 99232 SBSQ HOSP IP/OBS MODERATE 35: CPT | Mod: GC | Performed by: INTERNAL MEDICINE

## 2025-04-22 PROCEDURE — 250N000011 HC RX IP 250 OP 636: Mod: JZ

## 2025-04-22 PROCEDURE — 250N000013 HC RX MED GY IP 250 OP 250 PS 637: Performed by: INTERNAL MEDICINE

## 2025-04-22 PROCEDURE — 97530 THERAPEUTIC ACTIVITIES: CPT | Mod: GP

## 2025-04-22 PROCEDURE — 36415 COLL VENOUS BLD VENIPUNCTURE: CPT | Performed by: NURSE PRACTITIONER

## 2025-04-22 RX ADMIN — LEVOTHYROXINE SODIUM 175 MCG: 0.03 TABLET ORAL at 10:35

## 2025-04-22 RX ADMIN — OMEGA-3-ACID ETHYL ESTERS 1 G: 1 CAPSULE, LIQUID FILLED ORAL at 19:40

## 2025-04-22 RX ADMIN — EZETIMIBE 10 MG: 10 TABLET ORAL at 10:35

## 2025-04-22 RX ADMIN — ERTAPENEM SODIUM 1 G: 1 INJECTION, POWDER, LYOPHILIZED, FOR SOLUTION INTRAMUSCULAR; INTRAVENOUS at 22:11

## 2025-04-22 RX ADMIN — SIROLIMUS 1 MG: 1 TABLET, FILM COATED ORAL at 10:35

## 2025-04-22 RX ADMIN — HYDRALAZINE HYDROCHLORIDE 50 MG: 25 TABLET ORAL at 10:35

## 2025-04-22 RX ADMIN — SITAGLIPTIN 50 MG: 50 TABLET, FILM COATED ORAL at 10:36

## 2025-04-22 RX ADMIN — APIXABAN 5 MG: 5 TABLET, FILM COATED ORAL at 10:35

## 2025-04-22 RX ADMIN — URSODIOL 250 MG: 250 TABLET ORAL at 10:36

## 2025-04-22 RX ADMIN — Medication 9 MG: at 10:34

## 2025-04-22 RX ADMIN — OMEGA-3-ACID ETHYL ESTERS 1 G: 1 CAPSULE, LIQUID FILLED ORAL at 10:35

## 2025-04-22 RX ADMIN — URSODIOL 250 MG: 250 TABLET ORAL at 19:40

## 2025-04-22 RX ADMIN — HYDRALAZINE HYDROCHLORIDE 50 MG: 25 TABLET ORAL at 19:40

## 2025-04-22 RX ADMIN — APIXABAN 5 MG: 5 TABLET, FILM COATED ORAL at 19:40

## 2025-04-22 RX ADMIN — HYDRALAZINE HYDROCHLORIDE 50 MG: 25 TABLET ORAL at 14:11

## 2025-04-22 RX ADMIN — GABAPENTIN 300 MG: 250 SOLUTION ORAL at 22:11

## 2025-04-22 RX ADMIN — METOPROLOL SUCCINATE 25 MG: 25 TABLET, EXTENDED RELEASE ORAL at 10:35

## 2025-04-22 RX ADMIN — AMLODIPINE BESYLATE 2.5 MG: 2.5 TABLET ORAL at 10:35

## 2025-04-22 ASSESSMENT — ACTIVITIES OF DAILY LIVING (ADL)
ADLS_ACUITY_SCORE: 58
ADLS_ACUITY_SCORE: 60
ADLS_ACUITY_SCORE: 60
ADLS_ACUITY_SCORE: 57
ADLS_ACUITY_SCORE: 60
ADLS_ACUITY_SCORE: 60
ADLS_ACUITY_SCORE: 58
ADLS_ACUITY_SCORE: 60
ADLS_ACUITY_SCORE: 58
ADLS_ACUITY_SCORE: 60
ADLS_ACUITY_SCORE: 58
ADLS_ACUITY_SCORE: 60
ADLS_ACUITY_SCORE: 58
ADLS_ACUITY_SCORE: 60
ADLS_ACUITY_SCORE: 60

## 2025-04-22 NOTE — PROGRESS NOTES
[facility]    Progress Note - Medicine Service, JOVANNY TEAM 3       Date of Admission:  4/14/2025    Assessment & Plan   Luz Thompson is a 75 year old female admitted on 4/14/2025. 74 yo w/ PMHx of primary biliary cirrhosis s/p liver transplant in 2002, paroxysmal atrial fibrillation on apixaban, HTN, T2DM, CKD, HF, hx of CVA, hypothyroidism, recurrent ESBL UTIs with recent E. Faecalis bacteremia admission last month on IV ertapenem via PICC admitted for generalized weakness likely 2/2 infection     Today:  - Accepted to TCU! Bed Ready AM 4/23  - psychiatry consult for anti-depressant medication start     # sepsis 2/2 intraabdominal infection - resolved  # recurrent UTIs on IV ertapenem since 3/9/2024  # recent E. Faecalis bacteremia  # Sigmoid microperforation  Afebrile and hemodynamically stable with WBC 14.8 on admit though with reported fevers and lethargy. Given UA improved from prior with some leukocyte esterases, consideration for bacteremia 2/2 PICC line over recurrent UTI/pyelonephritis. Reports suprapubic tenderness. CXR w/ mild pulmonary edema. Recent admission in March 2025 for Klebsiella pneumonia UTI and E. Faecalis bacteremia with recommendations from ID for ertapenem 1g q24h until EOT 4/22. RVP negative. Per pt missed ertapenem from 4/8-4/13. Microperforation noted on CTAP. CRS consulted and recommend medical management.   Abx:   vancomycin: 4/14 - 4/16  ertapenem: 3/9 - 4/8, 4/14 - **  Duration will be determined at outpatient ID clinic follow up based on repeat imaging  meropenem: 3/8 - 3/9  Cultures:  PICC line blood cultures, NGTD  Urine culture, <10 000 CFU Urogenital louann  Transplant ID consulted, appreciate recs  Continue ertapenem  Repeat CT A/P w/o con ~ 4/28 for ID clinic appt.   CRS consult, appreciate recs  PT/OT recommending TCU, patient is agreeable & SW is aware     #Liver transplant in 2002 2/2 primary biliary cirrhosis  Follows  Dr. Ramirez. Has missed 2 days of medication.  PTA sirolimus 1 mg qD  PTA prednisone 10 mg qD  PTA ursodiol 250 mg BID  GI/Liver transplant consult, appreciate recs     # Depression   Reports feeling more down and depressed with little interest in doing things. Has a therapist in outpatient setting and has been on anti-depressants historically. Requested starting pharmacologics for depression, agreeable to seeing psychiatry to assist.   - psychiatry consult placed for anti-depressant medication start    # Mild Obstructive Sleep Apnea   # Nocturnal Hypoxemia   Sleep study in 2018 showing mild RASHIDA with recommendation to start CPAP. IT does not appear that there was further follow up and not using CPAP. Will discuss with patient to see if this was personal preference or would like referral back to sleep medicine as she has been placed on night time oxygen for desaturations during sleep during this hospital stay.   - consider sleep medicine referral    #CKD3b w/ moderate proteinuria, at baseline  #HTN  #?heart failure  Baseline Cr. 1.5. History of dialysis at time of liver transplant 23 years ago. Recurrent AKIs and immunosuppressive medication toxicity. Not on SGLT2i 2/2 recurrent UTI. Chart review w/ unclear heart failure history, unconfirmed with EF 60-65% on 3/10/25. Encourage oral intake for now. BNP 1446 elevated from 1 month ago. Given unclear HF history, trace bilateral LE edema, with some pulm edema noted on imaging, consideration for volume overload. However, without orthopnea or clear cough. Will defer diuresis.  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 Wound does not look infected.  - was scheduled for dermatology follow up appointment on 4/22, will need to be rescheduled. Discussed with Derm resident on call 4/21 who saw patient at bedside and discussed with her MOHS surgeon.      #anemia, chronic  Hgb 9.9 on admit. Stable.  HOLD PTA ferrous sulfate     #hypothyroidism  PTA levothyroxine 175mcg qD      # Chronic Shoulder Pain, Osteoarthritis   # Lower back pain  - diclofenac gel prn   - lidocaine patches prn     #Constipation   - Fiber qd prn                Diet: Snacks/Supplements Adult: Ensure Clear; With Meals  Snacks/Supplements Adult: Other; clear boost; With Meals  Regular Diet Adult  Diet    DVT Prophylaxis: DOAC  Ron Catheter: Not present  Fluids: none  Lines: None     Cardiac Monitoring: None  Code Status: Full Code      Clinically Significant Risk Factors               # Hypoalbuminemia: Lowest albumin = 2.8 g/dL at 4/15/2025  6:13 AM, will monitor as appropriate     # Hypertension: Noted on problem list           # DMII: A1C = 6.8 % (Ref range: <5.7 %) within past 6 months   # Overweight: Estimated body mass index is 26.38 kg/m  as calculated from the following:    Height as of this encounter: 1.702 m (5' 7\").    Weight as of this encounter: 76.4 kg (168 lb 6.4 oz).      # Financial/Environmental Concerns: none         Social Drivers of Health   Housing Stability: Low Risk  (4/15/2025)    Housing Stability     Do you have housing? : Yes     Are you worried about losing your housing?: No   Recent Concern: Housing Stability - High Risk (1/22/2025)    Housing Stability     Do you have housing? : No     Are you worried about losing your housing?: No   Tobacco Use: Medium Risk (4/8/2025)    Patient History     Smoking Tobacco Use: Former     Smokeless Tobacco Use: Never   Transportation Needs: Low Risk  " (4/15/2025)    Transportation Needs     Within the past 12 months, has lack of transportation kept you from medical appointments, getting your medicines, non-medical meetings or appointments, work, or from getting things that you need?: No   Recent Concern: Transportation Needs - High Risk (1/27/2025)    Transportation Needs     Within the past 12 months, has lack of transportation kept you from medical appointments, getting your medicines, non-medical meetings or appointments, work, or from getting things that you need?: Yes   Physical Activity: Insufficiently Active (11/9/2023)    Received from [facility]    Exercise Vital Sign     Days of Exercise per Week: 5 days     Minutes of Exercise per Session: 10 min         Disposition Plan     Medically Ready for Discharge: Ready Now         The patient's care was discussed with the Attending Physician, Dr. Courtney .    DEVONTE PACHECO MD  Medicine Service, [address]  Securely message with [employer] (more info)  Text page via [facility] Paging/Directory   See signed in provider for up to date coverage information  ______________________________________________________________________    Interval History   Reports feeling sort of crabby this morning and wanting to leave the hospital soon. Discussed going home vs. Staying here. Agreeable to stay until tomorrow AM.     Physical Exam   Vital Signs: Temp: 98.6  F (37  C) Temp src: Oral BP: 134/65 Pulse: 76   Resp: 16 SpO2: 96 % O2 Device: None (Room air)    Weight: 168 lbs 6.4 oz    Constitutional: awake, alert, NAD  Eyes: Lids and lashes normal  HENT: NCAT, healing mohs surgery on L cheek  Resp: No increased WOB, no cough or audible wheeze  Card: warm and well perfused.   Neuro: cranial nerves II-XII are grossly intact, uses hearing aids  Neuropsych: alert, answering questions appropriately, interactive, affect full    Medical Decision Making       Please see A&P for  additional details of medical decision making.      Data         Imaging results reviewed over the past 24 hrs:   No results found for this or any previous visit (from the past 24 hours).

## 2025-04-22 NOTE — PLAN OF CARE
Goal Outcome Evaluation:      Plan of Care Reviewed With: patient    Overall Patient Progress: no change    Pt is alert and oriented x4. Denies pain. Has baseline numbness and tingling on her feet. VSS on room air. R midline infusing IV ABT. R cheek skin lesion looks clean and dry. SBA- A1 with walker for mobility. Pt is voiding well. Uses pure wick at night for urgency. Will continue with plan of care.

## 2025-04-22 NOTE — PROGRESS NOTES
Care Management Follow Up    Length of Stay (days): 8    Expected Discharge Date: 04/23/2025     Concerns to be Addressed: discharge planning     Patient plan of care discussed at interdisciplinary rounds: Yes    Anticipated Discharge Disposition: Transitional Care  Anticipated Discharge Services: None  Anticipated Discharge DME: None    Patient/family educated on Medicare website which has current facility and service quality ratings: yes  Education Provided on the Discharge Plan: Yes  Patient/Family in Agreement with the Plan: yes    Referrals Placed by CM/SW: Homecare, Internal Clinic Care Coordination    Accepted  [facility]  [address] [place] [address]  P: [phone]  F: [phone]    Private pay costs discussed: Not applicable    Discussed  Partnership in Safe Discharge Planning  document with patient/family: Yes: Document provided     Handoff Completed: Yes, LIZ PCP: Internal handoff referral completed    Additional Information:  SW is following for discharge planning  Patient is med ready. Discharge pending safe discharge plan - home vs TCU    Yola Caro RNCC is simultaneously working on a home discharge plan while SW continues to look into TCU facilities.  Yola Caro reported that the patient stated if TCU is not found by tomorrow (4/23/25) the patient will plan to go home. Patient reported to Yola that she wants to go to a 4 or 5 star facility    SW sent additional TCU referrals that had ratings of 4-5 stars.    SW met with the patient at bedside. Patient expressed frustration with waiting for TCU placement. The patient asked the SW what is being sent in referrals. SW explained what documentation is sent and part of the initial SNF referral. Patient asked why facilities are declining. SW explained facilities are very full and they have to take into consideration insurance, acuity, and many other factors. Patient asked how long do patient's on this unit usually  have to wait for TCU. NADEEM explained it greatly varies due to the same factors listed before. NADEEM stated sometimes it can take a month sometimes a day, it all depends. SW stated they went down the list of facilities previously provided to the patient and send additional TCU referrals. SW stated they hope to hear something back by tomorrow morning, however they are unsure if they will. SW stated they will update the patient tomorrow morning or sooner if they hear back before then. Patient stated understanding and denied having any additional questions or concerns at this time.     Patient was accepted to [facility]    SW met with the patient at bedside and provided an update. SW reported she had been accepted to TCU and showed her on the list of facilities the one she was accepted to. Patient expressed excitement and stated she is very happy about this and very happy about the facility that accepted her. Patient stated she will call her children to gather some clothes and she will ask one of her children to provide transportation.    [id number]    Next Steps: Plan for patient to discharge to TCU tomorrow 4/23 with one of her children transporting.  Discharge orders will need to be sent to facility, IMM needs to be given    NAINA Hill  [address]   Office: [phone]  [email or web address]  Securely message with [employer] (Search Name or 7C Med Surg [id number] NADEEM)  Text page via [facility] Paging/Directory   See signed in provider for up to date coverage information  Social Work & Care Management Department

## 2025-04-23 ENCOUNTER — LAB REQUISITION (OUTPATIENT)
Dept: LAB | Facility: CLINIC | Age: 75
End: 2025-04-23
Payer: MEDICARE

## 2025-04-23 ENCOUNTER — DOCUMENTATION ONLY (OUTPATIENT)
Dept: GERIATRICS | Facility: CLINIC | Age: 75
End: 2025-04-23

## 2025-04-23 ENCOUNTER — APPOINTMENT (OUTPATIENT)
Dept: OCCUPATIONAL THERAPY | Facility: CLINIC | Age: 75
DRG: 872 | End: 2025-04-23
Payer: MEDICARE

## 2025-04-23 VITALS
HEIGHT: 67 IN | RESPIRATION RATE: 16 BRPM | WEIGHT: 168.4 LBS | BODY MASS INDEX: 26.43 KG/M2 | SYSTOLIC BLOOD PRESSURE: 179 MMHG | OXYGEN SATURATION: 100 % | DIASTOLIC BLOOD PRESSURE: 88 MMHG | HEART RATE: 66 BPM | TEMPERATURE: 99.4 F

## 2025-04-23 DIAGNOSIS — Z11.1 ENCOUNTER FOR SCREENING FOR RESPIRATORY TUBERCULOSIS: ICD-10-CM

## 2025-04-23 LAB
GLUCOSE BLDC GLUCOMTR-MCNC: 126 MG/DL (ref 70–99)
GLUCOSE BLDC GLUCOMTR-MCNC: 156 MG/DL (ref 70–99)

## 2025-04-23 PROCEDURE — 99239 HOSP IP/OBS DSCHRG MGMT >30: CPT | Mod: GC | Performed by: INTERNAL MEDICINE

## 2025-04-23 PROCEDURE — 250N000012 HC RX MED GY IP 250 OP 636 PS 637

## 2025-04-23 PROCEDURE — 99222 1ST HOSP IP/OBS MODERATE 55: CPT | Performed by: PSYCHIATRY & NEUROLOGY

## 2025-04-23 PROCEDURE — 250N000013 HC RX MED GY IP 250 OP 250 PS 637

## 2025-04-23 PROCEDURE — 250N000013 HC RX MED GY IP 250 OP 250 PS 637: Performed by: PSYCHIATRY & NEUROLOGY

## 2025-04-23 PROCEDURE — 97535 SELF CARE MNGMENT TRAINING: CPT | Mod: GO

## 2025-04-23 PROCEDURE — 250N000013 HC RX MED GY IP 250 OP 250 PS 637: Performed by: INTERNAL MEDICINE

## 2025-04-23 PROCEDURE — 250N000012 HC RX MED GY IP 250 OP 636 PS 637: Performed by: NURSE PRACTITIONER

## 2025-04-23 RX ORDER — SERTRALINE HYDROCHLORIDE 25 MG/1
TABLET, FILM COATED ORAL
DISCHARGE
Start: 2025-04-23 | End: 2025-04-25

## 2025-04-23 RX ORDER — SERTRALINE HYDROCHLORIDE 25 MG/1
25 TABLET, FILM COATED ORAL DAILY
Status: DISCONTINUED | OUTPATIENT
Start: 2025-04-23 | End: 2025-04-23 | Stop reason: HOSPADM

## 2025-04-23 RX ADMIN — HYDRALAZINE HYDROCHLORIDE 50 MG: 25 TABLET ORAL at 08:01

## 2025-04-23 RX ADMIN — OMEGA-3-ACID ETHYL ESTERS 1 G: 1 CAPSULE, LIQUID FILLED ORAL at 08:01

## 2025-04-23 RX ADMIN — LEVOTHYROXINE SODIUM 175 MCG: 0.03 TABLET ORAL at 08:00

## 2025-04-23 RX ADMIN — SITAGLIPTIN 50 MG: 50 TABLET, FILM COATED ORAL at 08:01

## 2025-04-23 RX ADMIN — METOPROLOL SUCCINATE 25 MG: 25 TABLET, EXTENDED RELEASE ORAL at 08:01

## 2025-04-23 RX ADMIN — EZETIMIBE 10 MG: 10 TABLET ORAL at 08:01

## 2025-04-23 RX ADMIN — SIROLIMUS 1 MG: 1 TABLET, FILM COATED ORAL at 08:02

## 2025-04-23 RX ADMIN — Medication 9 MG: at 08:00

## 2025-04-23 RX ADMIN — URSODIOL 250 MG: 250 TABLET ORAL at 08:00

## 2025-04-23 RX ADMIN — APIXABAN 5 MG: 5 TABLET, FILM COATED ORAL at 08:01

## 2025-04-23 RX ADMIN — SERTRALINE HYDROCHLORIDE 25 MG: 25 TABLET ORAL at 09:46

## 2025-04-23 RX ADMIN — AMLODIPINE BESYLATE 2.5 MG: 2.5 TABLET ORAL at 08:01

## 2025-04-23 ASSESSMENT — ACTIVITIES OF DAILY LIVING (ADL)
ADLS_ACUITY_SCORE: 57
ADLS_ACUITY_SCORE: 54
ADLS_ACUITY_SCORE: 57

## 2025-04-23 NOTE — PROGRESS NOTES
Care Management Discharge Note    Discharge Date: 04/23/2025       Discharge Disposition: Transitional Care    Discharge Services: None    Discharge DME: None    Discharge Transportation: family or friend will provide, agency    Private pay costs discussed: Not applicable    Does the patient's insurance plan have a 3 day qualifying hospital stay waiver?  No    PAS Confirmation Code: [id number]  Patient/family educated on Medicare website which has current facility and service quality ratings: yes    Education Provided on the Discharge Plan: Yes  Persons Notified of Discharge Plans: Patient, TCU facility, provider, nursing staff, SW  Patient/Family in Agreement with the Plan: yes    Handoff Referral Completed: No, handoff not indicated or clinically appropriate    Additional Information:  Plan was for patient to discharge to [facility] with family picking up at 1000    @0815 NADEEM sent discharge orders to the facility via [employer] in basket and reported the anticipated time of discharge.    SW received a call from the facility reporting 1000 was too early and the room will not be ready or cleaned by then. Facility requested patient discharge around 1200    SW messaged bedside nurse who informed the patient of this. Patient reported to bedside nurse that her son was going to transport, however he has a an appt at 1300 today. Patient plans to call the son and further discuss. Bedside nurse informed the patient SW can arrange transportation, however there would be an out of pocket cost.    SW confirmed with the provider that the patient will not discharge on any narcotics and will not need any paper scripts.     NAINA Hill  [address]   Office: [phone]  [email or web address]  Securely message with Zachariah (Search Name or 7C Med Surg [phone] NADEEM)  Text page via [facility] Paging/Directory   See signed in provider for up to date coverage information  Social Work & Care Management  Department

## 2025-04-23 NOTE — PLAN OF CARE
Physical Therapy Discharge Summary    Reason for therapy discharge:    Discharged to transitional care facility.    Progress towards therapy goal(s). See goals on Care Plan in [facility] electronic health record for goal details.  Goals met    Therapy recommendation(s):    Continued therapy is recommended.  Rationale/Recommendations:  Patient is recommended to continue with PT to further increase activity tolerance & independence with functional mobility.

## 2025-04-23 NOTE — CONSULTS
"        Initial Psychiatric Consult   Consult date: April 23, 2025         Reason for Consult, requesting source:    Depression.   Requesting source: Thomas Luz    Labs and imaging reviewed. Discussed with social work     Total time spent in chart review, patient interview and coordination of care; 70 min          HPI:   From medicine note 4/22:   \"Luz Thompson is a 75 year old female admitted on 4/14/2025. 74 yo w/ PMHx of primary biliary cirrhosis s/p liver transplant in 2002, paroxysmal atrial fibrillation on apixaban, HTN, T2DM, CKD, HF, hx of CVA, hypothyroidism, recurrent ESBL UTIs with recent E. Faecalis bacteremia admission last month on IV ertapenem via PICC admitted for generalized weakness likely 2/2 infection   Today:  - Accepted to TCU! Bed Ready AM 4/23  - psychiatry consult for anti-depressant medication start\"    She is wondering if it is time to start an antidepressant. She has been feeling a bit down, with loss of interests in usual activities, tendency to isolate; \"sometimes I don't even get dressed for the day\". Also with some excessive worry, but no panic, no mood swings. Transient feelings of being hopeless, but no passive death wish or SI.   She attributes her symptoms to moving from [place] last summer to a new apartment. She had been down there for about 6 years, but  soon after arriving there. Her children thought it would be better for her to be back close to home (she is from here).           Past Psychiatric History:   Per Epic she had been on Zoloft  mg in 2018 and 2019. She recalls that she was on an antidepressant during divorce at that time.   Also has worked with a psychotherapist         Substance Use and History:          Tobacco Use    Smoking status: Former     Current packs/day: 0.00     Average packs/day: 1 pack/day for 18.0 years (18.0 ttl pk-yrs)     Types: Cigarettes     Start date: 4/12/1967     Quit date: 4/12/1985     Years since quitting: " "40.0    Smokeless tobacco: Never   Substance Use Topics    Alcohol use: Yes     Alcohol/week: 0.0 standard drinks of alcohol     Comment: rare - \"I toast at weddings\"           Past Medical History:   PAST MEDICAL HISTORY:   Past Medical History:   Diagnosis Date    Anemia of chronic disease 10/17/2011    Anxiety     CKD (chronic kidney disease) stage 3, GFR 30-59 ml/min (H) 04/04/2012    Coccidioidomycosis, 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 Gentry Ramirez, 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       PAST SURGICAL HISTORY:   Past Surgical History:   Procedure Laterality Date    APPENDECTOMY  1961    CATARACT IOL, RT/LT      RE12/19/2013, LE12/10/2013 - Toric lenses    CHOLECYSTECTOMY  1991    COLONOSCOPY  03/10/2014    Procedure: COLONOSCOPY;;  Surgeon: Gentry Ramirez MD;  Location:  GI    CYSTOSCOPY      ear drum repair      ENDOBRONCHIAL ULTRASOUND " FLEXIBLE N/A 2017    Procedure: ENDOBRONCHIAL ULTRASOUND FLEXIBLE;  Flexible Bronchoscopy, Endobronchial Ultrasound, Transbronchial Needle Aspiration ;  Surgeon: Eden Clinton MD;  Location: [facility] OR    ENDOSCOPIC RETROGRADE CHOLANGIOPANCREATOGRAM  2013    Procedure: ENDOSCOPIC RETROGRADE CHOLANGIOPANCREATOGRAM;  Endoscopic Retrograde Cholangiopancreatogram with single balloon enteroscopy, ballon sweep of bile duct;  Surgeon: Brett Membreno MD;  Location: [facility] OR    HC KNEE SCOPE,MED/LAT MENISECTOMY Right 08/10/2012    partial medial menisectomy only    KNEE SURGERY  1966    R knee    PICC INSERTION  2013    4fr SL PASV PICC, 40cm (1cm external) in the R basilic vein w/ tip in the low SVC    PICC INSERTION  2014    5 fr DL BioFlo Navilyst PICC, 46 cm (3 cm external) in the L basilic vein w/ tip in the SVC RA junction.    THYROIDECTOMY  2010    TRANSPLANT LIVER RECIPIENT LIVING UNRELATED  2002             Family History:   FAMILY HISTORY:   Family History   Problem Relation Age of Onset    Hypertension Mother     Endometrial Cancer Mother     Hyperlipidemia Mother     Prostate Cancer Father     Macular Degeneration Father     Cancer - colorectal Maternal Grandmother         in her 80's, has surgery and removal of part of kidney,  at age 98    Heart Disease Maternal Grandfather          at 98    Glaucoma Maternal Grandfather     Cerebrovascular Disease Paternal Grandmother         in her 80's    Hypertension Paternal Grandmother     Heart Disease Paternal Grandfather         MI    Alzheimer Disease Paternal Grandfather     Allergies Son     Neurologic Disorder Daughter         Migraines    Breast Cancer Other     Anesthesia Reaction No family hx of     Crohn's Disease No family hx of     Ulcerative Colitis No family hx of     Melanoma No family hx of     Skin Cancer No family hx of            Social History:   She is a [place] native, has 4 children,  6 years ago. 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          Physical ROS:   The 10 point Review of Systems is negative other than noted in the HPI or here.           Medications:     Current Facility-Administered Medications   Medication Dose Route Frequency Provider Last Rate Last Admin    amLODIPine (NORVASC) tablet 2.5 mg  2.5 mg Oral Daily Dianne Pederson MD   2.5 mg at 04/23/25 0801    apixaban ANTICOAGULANT (ELIQUIS) tablet 5 mg  5 mg Oral BID Surekha Moncada MD   5 mg at 04/23/25 0801    ertapenem (INVanz) 1 g vial to attach to  mL bag  1 g Intravenous Q24H Surekha Moncada  mL/hr at 04/19/25 2211 1 g at 04/22/25 2211    [Held by provider] evolocumab (REPATHA) injection 140 mg  140 mg Subcutaneous Q14 Days Surekha Moncada MD        ezetimibe (ZETIA) tablet 10 mg  10 mg Oral Daily Surekha Moncada MD   10 mg at 04/23/25 0801    gabapentin (NEURONTIN) solution 300 mg  300 mg Oral At Bedtime Surekha Moncada MD   300 mg at 04/22/25 2211    hydrALAZINE (APRESOLINE) tablet 50 mg  50 mg Oral TID Dianne Pederson MD   50 mg at 04/23/25 0801    insulin aspart (NovoLOG) injection (RAPID ACTING)  1-7 Units Subcutaneous TID AC Dianne Pederson MD   1 Units at 04/20/25 1657    insulin aspart (NovoLOG) injection (RAPID ACTING)  1-5 Units Subcutaneous At Bedtime Dianne Pederson MD   1 Units at 04/22/25 2211    levothyroxine (SYNTHROID/LEVOTHROID) tablet 175 mcg  175 mcg Oral Daily Surekha Moncada MD   175 mcg at 04/23/25 0800    metoprolol succinate ER (TOPROL XL) 24 hr tablet 25 mg  25 mg Oral Daily Surekha Moncada MD   25 mg at 04/23/25 0801    omega-3 acid ethyl esters (LOVAZA) capsule 1 g  1 g Oral BID Dory Miller MD   1 g at 04/23/25 0801    polyethylene glycol (MIRALAX) Packet 17 g  17 g Oral Daily Surekha Moncada MD   17 g at 04/20/25 0957    predniSONE (DELTASONE) half-tab 9 mg  9 mg Oral Daily Luzma Osman APRN CNP   9 mg at 04/23/25 0800    sertraline  (ZOLOFT) tablet 25 mg  25 mg Oral Daily Samson Fuentes MD        Followed by    [START ON 4/30/2025] sertraline (ZOLOFT) tablet 50 mg  50 mg Oral Daily Samson Fuentes MD        sirolimus (GENERIC EQUIVALENT) tablet 1 mg  1 mg Oral Daily Surekha Moncada MD   1 mg at 04/23/25 0802    sitagliptin (JANUVIA) tablet 50 mg  50 mg Oral Daily Thomas Luz MD   50 mg at 04/23/25 0801    sodium chloride (PF) 0.9% PF flush 3 mL  3 mL Intracatheter Q8H LUZMARIA Surekha Moncada MD   3 mL at 04/22/25 2212    ursodiol (ACTIGALL) tablet 250 mg  250 mg Oral BID Surekha Moncada MD   250 mg at 04/23/25 0800              Allergies:     Allergies   Allergen Reactions    Fluconazole Hives and Itching     Full body hives  **Intradermal skin testing negative**  [See intradermal skin testing results from 8/2/2019]    Mycophenolate Diarrhea and Nausea and Vomiting     Patient stated it was chronic and lasted months      Penicillins Anaphylaxis, Hives, Itching and Rash     **Intradermal skin testing negative**  [See intradermal skin testing results from 8/2/2019]      Simvastatin Muscle Pain (Myalgia)     severe  Other reaction(s): Myalgia caused by statin    Methotrexate Other (See Comments)     Other reaction(s): Sore  Sores in mouth, esophagus, and stomach.       Morphine And Codeine Itching and Other (See Comments)     Psych disturbance  Other reaction(s): Confusion, Mood alteration    Quinolones Anxiety, Dizziness, Headache, Other (See Comments), Palpitations and Unknown     Other reaction(s): Hyperactive behavior, Lightheadedness, Mood alteration    Dizzy, light headed    Dizziness, shaky, and jumpy    Capsules, Empty Gelatin [Gelatin]     Carvedilol Diarrhea     Per pt - severe diarrhea and LE swelling  + tolerating Toprol    Lansoprazole Diarrhea    Strawberry Extract     Azithromycin Itching     [See intradermal skin testing results from 8/2/2019]    Bactrim [Sulfamethoxazole-Trimethoprim] Other (See Comments)     Numb mouth, tingling  lips (treated with anti-histamines)    Cephalosporins Itching     [See intradermal skin testing results from 8/2/2019]    Ciprofloxacin Hcl Other (See Comments) and Dizziness     Insomnia, mood lability, Irregular heart beat         Doxycycline Itching and Unknown     [See intradermal skin testing results from 8/2/2019]    Lisinopril Cough    Omeprazole Itching    Tolectin [Nsaids] Rash    Tolmetin Rash and Itching    Tramadol Rash, Hives and Itching          Labs:     Recent Results (from the past 48 hours)   Glucose by meter    Collection Time: 04/21/25 12:06 PM   Result Value Ref Range    GLUCOSE BY METER POCT 108 (H) 70 - 99 mg/dL   Glucose by meter    Collection Time: 04/21/25  5:47 PM   Result Value Ref Range    GLUCOSE BY METER POCT 138 (H) 70 - 99 mg/dL   Glucose by meter    Collection Time: 04/21/25  9:38 PM   Result Value Ref Range    GLUCOSE BY METER POCT 192 (H) 70 - 99 mg/dL   Glucose by meter    Collection Time: 04/22/25  2:19 AM   Result Value Ref Range    GLUCOSE BY METER POCT 131 (H) 70 - 99 mg/dL   Sirolimus by Tandem Mass Spectrometry    Collection Time: 04/22/25  6:24 AM   Result Value Ref Range    Sirolimus by Tandem Mass Spectrometry 3.9 (L) 5.0 - 15.0 ug/L    Sirolimus Last Dose Date      Sirolimus Last Dose Time     Glucose by meter    Collection Time: 04/22/25  9:33 AM   Result Value Ref Range    GLUCOSE BY METER POCT 90 70 - 99 mg/dL   Glucose by meter    Collection Time: 04/22/25  1:45 PM   Result Value Ref Range    GLUCOSE BY METER POCT 103 (H) 70 - 99 mg/dL   Glucose by meter    Collection Time: 04/22/25  4:45 PM   Result Value Ref Range    GLUCOSE BY METER POCT 132 (H) 70 - 99 mg/dL   Glucose by meter    Collection Time: 04/22/25  9:24 PM   Result Value Ref Range    GLUCOSE BY METER POCT 236 (H) 70 - 99 mg/dL   Glucose by meter    Collection Time: 04/23/25  1:56 AM   Result Value Ref Range    GLUCOSE BY METER POCT 156 (H) 70 - 99 mg/dL   Glucose by meter    Collection Time: 04/23/25   "7:54 AM   Result Value Ref Range    GLUCOSE BY METER POCT 126 (H) 70 - 99 mg/dL          Physical and Psychiatric Examination:     BP (!) 179/88   Pulse 66   Temp 99.4  F (37.4  C) (Oral)   Resp 16   Ht 1.702 m (5' 7\")   Wt 76.4 kg (168 lb 6.4 oz)   LMP 06/01/1988 (Approximate)   SpO2 100%   BMI 26.38 kg/m    Weight is 168 lbs 6.4 oz  Body mass index is 26.38 kg/m .    Physical Exam:  I have reviewed the physical exam as documented by by the medical team and agree with findings and assessment and have no additional findings to add at this time.         MSE:   Appearance: awake, alert and adequately groomed, enjoying breakfast. A bit hard of hearing   Attitude:  cooperative  Eye Contact:  fair  Mood:  \"fair\"  Affect:  slightly restricted  Speech:  clear, coherent  Psychomotor Behavior:  no evidence of tardive dyskinesia, dystonia, or tics  Muscle strength and tone: intact   Thought Process:  tangental  Associations:  no loose associations  Thought Content:  no evidence of suicidal ideation or homicidal ideation and no evidence of psychotic thought  Insight:  fair  Judgement:  fair  Oriented to:  time, person, and place  Attention Span and Concentration:  fair  Recent and Remote Memory:  fair         QTc: 409 ms 4/14         DSM-5 Diagnosis:   296.31 (F33.0) Major Depressive Disorder, Recurrent Episode, Mild With anxious distress          Assessment:   I think that she would benefit from an antidepressant, and Zoloft seems to be a reasonable choice.   I see that her vitamin D levels have been low in the past; consider supplement?           Summary of Recommendations:   Since she is going to a TCU today I wrote for Zoloft 25 mg per day linked to 50 mg in a week. In a few more weeks she should go to 100 mg.   Hopefully her PCP Dr Daniel Hidalgo can continue prescribing this. I told her that she should stay on it at least through next winter.           \"This dictation was performed with voice recognition software " "and may contain errors,  omissions and inadvertent word substitution.\"           "

## 2025-04-23 NOTE — PLAN OF CARE
Goal Outcome Evaluation:      Plan of Care Reviewed With: patient    Overall Patient Progress: no change    Discharge to: [facility]   Transportation: son   Time: 1130  Prescriptions: sent to TCU pharmacy  Belongings: remain with patient. Tote bag with dentures, denture paste, clothing, benefiber packets, paperwork, bilateral hearing aids, , cell phone, shoes, 4WW   -if applicable return home meds from inpatient pharmacy: NA  Access: midline to remain in for Q24hr IV abx. Caps and dressing changed p/t discharge  Care plan and education discontinued: yes  Paperwork: sent with son at discharge to deliver to TCU    Shift Hours: 0700 - 1200    Assessment:  Body systems that were not at patient's baseline Cardiac, Peripheral Neurovascular, and Genitourinary. Focused body system assessments documented in flowsheets.        Activity     Fall Risk Score: 40   Bed alarm on? No     Activity Assistance Provided: assistance, stand-by      Assistive Device Utilized: walker, gait belt    Pain: 2/10 bilateral shoulders, back, arthritis in hands. Declines medication management    Labs/RN Managed Protocols: WDL    Lines/Drains: midline SL, intact, clean, and dry    Nutrition: tolerating regular diet with good appetite. BG remains WDL    Goal Outcome Evaluation  Plan of Care Reviewed With: patient  Overall Patient Progress: no change     Patient showered, dressed, given morning medications, and breakfast. Midline dressing changed per policy, next change due 4/30. Cap change due 4/27.    Barriers to Discharge:   Safe discharge destination: TCU bed secured this AM at [facility]. Son to  patient around 1100 for a 1200 bed-ready time.

## 2025-04-23 NOTE — DISCHARGE SUMMARY
"[facility]  Discharge Summary - Medicine & Pediatrics       Date of Admission:  4/14/2025  Date of Discharge:  4/23/2025  Discharging Provider: Dianne Pederson MD PGY2  Discharge Service: Medicine Service, JOVANNY TEAM 3    Discharge Diagnoses   # sepsis 2/2 intraabdominal infection - resolved  # recurrent UTIs on IV ertapenem since 3/9/2024  # recent E. Faecalis bacteremia  # Sigmoid microperforation  #Liver transplant in 2002 2/2 primary biliary cirrhosis   #Depression  # Mild Obstructive Sleep Apnea   # Nocturnal Hypoxemia   #CKD3b w/ moderate proteinuria, at baseline  #HTN  #T2DM (A1c 6.8% 4/14/25)   #paroxysmal atrial fibrillatin   #CVD/HLD   #L cheek SCC s/p MOHS 4/8/25   #Hypothyroidism   #Chronic Shoulder Pain, osteoarthiritis   #Constipation  #Crhonic Anemia, ENRIKE    Clinically Significant Risk Factors     # DMII: A1C = 6.8 % (Ref range: <5.7 %) within past 6 months  # Overweight: Estimated body mass index is 26.38 kg/m  as calculated from the following:    Height as of this encounter: 1.702 m (5' 7\").    Weight as of this encounter: 76.4 kg (168 lb 6.4 oz).       Follow-ups Needed After Discharge   Follow-up Appointments       Follow Up and recommended labs and tests      Follow up with specialist, Infectious Disease, in 4 weeks .  The following labs/tests are recommended: CT abdomen pelvis.                Unresulted Labs Ordered in the Past 30 Days of this Admission       No orders found from 3/15/2025 to 4/15/2025.            Discharge Disposition   Transferred to Transitional Care Unit   Condition at discharge: Stable    Hospital Course   Luz Thompson was admitted on 4/14/2025. The following problems were addressed during her hospitalization:  Luz Thompson is a 75 year old female admitted on 4/14/2025. 74 yo w/ PMHx of primary biliary cirrhosis s/p liver transplant in 2002, paroxysmal atrial fibrillation on apixaban, HTN, T2DM, CKD, HF, hx of CVA, " hypothyroidism, recurrent ESBL UTIs with recent E. Faecalis bacteremia admission last month on IV ertapenem via PICC admitted for generalized weakness likely 2/2 infection. Found to have intraabdominal infection secondary to a sigmoid microperforation.       # sepsis 2/2 intraabdominal infection - resolved  # recurrent UTIs on IV ertapenem since 3/9/2024  # recent Hx of E. Faecalis bacteremia  # Sigmoid microperforation  Afebrile and hemodynamically stable with WBC 14.8 on admit though with reported fevers and lethargy. CXR w/ mild pulmonary edema. Recent admission in March 2025 for Klebsiella pneumonia UTI and E. Faecalis bacteremia with recommendations from ID for ertapenem 1g q24h until EOT 4/22. RVP negative. Per pt missed ertapenem from 4/8-4/13. Microperforation noted on CTAP. CRS consulted and recommend medical management.   Abx:   vancomycin: 4/14 - 4/16  ertapenem: 3/9 - 4/8, 4/14 - **  Duration will be determined at outpatient ID clinic follow up based on repeat imaging  meropenem: 3/8 - 3/9  Cultures:  PICC line blood cultures, NGTD  Urine culture, <10 000 CFU Urogenital louann  Transplant ID consulted, appreciate recs  Continue ertapenem  Repeat CT A/P w/o con ~ 4/28 for ID clinic appt.   CRS consult, appreciate recs  PT/OT recommending TCU, patient is agreeable & SW is aware     #Liver transplant in 2002 2/2 primary biliary cirrhosis  Follows Dr. Ramirez. Has missed 2 days of medication.  PTA sirolimus 1 mg qD  PTA prednisone 10 mg qD  PTA ursodiol 250 mg BID  GI/Liver transplant consulted during hospital admission     # Depression   Reports feeling more down and depressed with little interest in doing things. Has a therapist in outpatient setting and has been on anti-depressants historically. Requested starting pharmacologics for depression, agreeable to seeing psychiatry to assist.   - psychiatry consult    - started of zoloft 25 mg x 7 days, 50 mg every day there after     # Mild Obstructive Sleep Apnea    # Nocturnal Hypoxemia   Sleep study in 2018 showing mild RASHIDA with recommendation to start CPAP. IT does not appear that there was further follow up and not using CPAP.  - PCP to consider sleep medicine referral     #CKD3b w/ moderate proteinuria, at baseline  #HTN  Baseline Cr. 1.5. History of dialysis at time of liver transplant 23 years ago. Recurrent AKIs and immunosuppressive medication toxicity. Not on SGLT2i 2/2 recurrent UTI. Chart review w/ unclear heart failure history, unconfirmed with EF 60-65% on 3/10/25. Encourage oral intake for now. BNP 1446 elevated from 1 month ago. Given unclear HF history, trace bilateral LE edema, with some pulm edema noted on imaging, consideration for volume overload. However, without orthopnea or clear cough. Will defer diuresis.  PTA amlodipine 2.5mg qD  PTA hydralazine 50mg TID  PTA metoprolol succinate 25mg qD     #T2DM  (A1c 6.8% 4/14/25)  resume PTA linagliptin 5mg every day  discontinue PTA metformin 500mg qPM (declined, not actually taking this at home)  PTA gabapentin 300mg at bedtime  BG checks  MDSS      #CVD/HLD  HOLD PTA evolocumab q2week  PTA eztimibe 10mg qD     #paroxysmal atrial fibrillatin  PTA apixaban 5 mg BID     #L cheek SCC s/p MOHS 4/8/25  Tumor debulk with perineural invasion w/ pending Tumor Board for consideration of radiation therapy. Wound does not look infected.  - was scheduled for dermatology follow up appointment on 4/22, will need to be rescheduled. Discussed with Derm resident on call 4/21 who saw patient at bedside and discussed with her MOHS surgeon.      #anemia, chronic  Hgb 9.9 on admit. Stable.  -  PTA ferrous sulfate     #hypothyroidism  PTA levothyroxine 175mcg qD      # Chronic Shoulder Pain, Osteoarthritis   # Lower back pain  - diclofenac gel prn   - lidocaine patches prn     #Constipation   - Fiber qd prn     Consultations This Hospital Stay   PHARMACY TO DOSE VANCO  CARE MANAGEMENT / SOCIAL WORK IP CONSULT  INFECTIOUS DISEASE  TRANSPLANT SOT ADULT IP CONSULT  GI HEPATOLOGY ADULT IP CONSULT  PHARMACY TO DOSE VANCO  PHYSICAL THERAPY ADULT IP CONSULT  PHARMACY TO DOSE VANCO  OCCUPATIONAL THERAPY ADULT IP CONSULT  OCCUPATIONAL THERAPY ADULT IP CONSULT  NURSING TO CONSULT FOR VASCULAR ACCESS CARE IP CONSULT  COLORECTAL SURGERY ADULT IP CONSULT  PHYSICAL THERAPY ADULT IP CONSULT  OCCUPATIONAL THERAPY ADULT IP CONSULT  PSYCHIATRY IP CONSULT    Code Status   Full Code       The patient was discussed with Dr. Courtney.    MD Cara [facility]  [facility] MED SUR[facility]  [address]  [place] [address]  Phone: [phone]  ______________________________________________________________________    Physical Exam   Vital Signs: Temp: 99.4  F (37.4  C) Temp src: Oral BP: (!) 146/84 Pulse: 64   Resp: 16 SpO2: 100 % O2 Device: Nasal cannula Oxygen Delivery: 2 LPM  Weight: 168 lbs 6.4 oz    General: Pt laying comfortably in bed, in no acute distress. Not requiring oxygen.   ENT: Extra-occular movements intact and symmetrical bilaterally. Tongue is midline. MOHS surigcal site wo draingage or new swelling on left cheek.   Cardio: Regular rate and rhythm. No murmurs, gallops, rubs.   Pulm: Clear to ascultation bilaterally without wheeze, crackles, rhonchi.   Abd: Active bowel sounds. Non-tender to palpation. No rebound or guarding.   Lower extremities: No lower extremity edema present bilaterally.   Neuro: Alert and oriented to situation. Moves all extremities independently.   Psych: Appropriate mood and affect.         Primary Care Physician   Daniel Hidalgo    Discharge Orders      CT Abdomen Pelvis w/o Contrast     Primary Care - Care Coordination Referral      Med Therapy Management Referral      General info for SNF    Length of Stay Estimate: Short Term Care: Estimated # of Days 31-90  Condition at Discharge: Stable  Level of care:skilled   Rehabilitation Potential: Good  Admission H&P remains valid and up-to-date: Yes  Recent  Chemotherapy: N/A  Use Nursing Home Standing Orders: Yes     Tubes and Drains    Current Tubes and Drains:     Drain  Duration           Urinary Drain 04/15/25 External Catheter 6 days        Line  Duration           Midline Catheter Single Lumen Right Basilic 39 days              Reason for your hospital stay    [place], you were in the hospital for sepsis caused by an intraabdominal infection, while you were here you had a CT scan which showed a small contained perforation in your bowel. Colorectal surgery saw you and did not think it required surgical intervention, so you were managed with antibiotics. You will be going to a transition care facility to get your strength back before you go home. You will  have follow up with a repeat CT scan and see infectious disease in about 4 weeks to determine how long you need to be on antibiotics.     Activity - Up ad ashley     Glucose monitor nursing POCT    Before meals and at bedtime     Follow Up and recommended labs and tests    Follow up with specialist, Infectious Disease, in 4 weeks .  The following labs/tests are recommended: CT abdomen pelvis.     Follow Up (Transitional Care Management)    Follow up with dermatology follow TCU stay for CT/PET and further needs for MOHs post-op.    Appointments on [place] and/or [facility] (with [facility] or [facility] provider or service). Call [phone] if you haven't heard regarding these appointments within 7 days of discharge.     Full Code     Physical Therapy Adult Consult    Evaluate and treat as clinically indicated.    Reason:  deconditioning     Occupational Therapy Adult Consult    Evaluate and treat as clinically indicated.    Reason:  deconditioning     Diet    Follow this diet upon discharge: Current Diet:Orders Placed This Encounter      Snacks/Supplements Adult: Ensure Clear; With Meals      Snacks/Supplements Adult: Other; clear boost; With Meals      Regular Diet Adult       Significant Results and Procedures    Most Recent 3 CBC's:  Recent Labs   Lab Test 04/20/25  0637 04/19/25  0714 04/18/25  0618   WBC 8.3 6.0 7.1   HGB 8.6* 8.7* 8.7*   MCV 87 92 90    383 347     Most Recent 3 BMP's:  Recent Labs   Lab Test 04/23/25  0156 04/22/25  2124 04/22/25  1645 04/20/25  0950 04/20/25  0637 04/19/25  0834 04/19/25  0714 04/18/25  0852 04/18/25  0618   NA  --   --   --   --  143  --  144  --  141   POTASSIUM  --   --   --   --  4.2  --  4.4  --  4.2   CHLORIDE  --   --   --   --  110*  --  111*  --  109*   CO2  --   --   --   --  23  --  24  --  22   BUN  --   --   --   --  35.6*  --  33.5*  --  35.3*   CR  --   --   --   --  1.33*  --  1.32*  --  1.40*   ANIONGAP  --   --   --   --  10  --  9  --  10   KEILY  --   --   --   --  8.8  --  8.8  --  8.7*   * 236* 132*   < > 113*   < > 116*   < > 122*    < > = values in this interval not displayed.   ,   Results for orders placed or performed during the hospital encounter of 04/14/25   XR Chest 2 Views    Narrative    EXAM: XR CHEST 2 VIEWS 4/14/2025 4:50 PM    INDICATION: Infectious work up    COMPARISON: Chest radiograph 3/8/2025    FINDINGS:   Metallic apparatus projecting over the aortic knob. Additional tubular  device in stable position projecting over the mid lung. Similar patchy  perihilar and increased left basilar hazy opacities. Trachea is  midline. Cardiac silhouette is stable. Upper abdomen unremarkable.      Impression    IMPRESSION:   Similar mild pulmonary edema and basilar atelectasis.    I have personally reviewed the examination and initial interpretation  and I agree with the findings.    OLIVIA CHAPMAN DO         SYSTEM ID:  [id number]   US Lower Extremity Venous Duplex Bilateral    Narrative    EXAMINATION: DOPPLER VENOUS ULTRASOUND OF BILATERAL LOWER EXTREMITIES,  4/15/2025 10:55 PM     COMPARISON: Ultrasound 6/13/2023. PET/CT 8/8/2017.    HISTORY: Lower extremity pain and edema    TECHNIQUE:  Gray-scale evaluation with compression, spectral flow  and  color Doppler assessment of the deep venous system of both legs from  groin to knee, and then at the ankles.    FINDINGS:  In both lower extremities, the common femoral, superficial femoral,  deep profunda, popliteal and posterior tibial veins demonstrate normal  compressibility and blood flow. Duplicated left femoral vein noted.    Anechoic and avascular fluid collection along the right popliteal  fossa measuring 10.7 x 2.0 x 2.7 cm.    Hyperechoic regions with dense posterior shadowing in the subcutaneous  soft tissues of the right ankle may represent calcification from  postprocedural change related to history of radiofrequency ablation.      Impression    IMPRESSION:  1.  No evidence of deep venous thrombosis in either lower extremity.  2.  Large cystic structure in the right popliteal fossa likely  represents a Baker's cyst measures up to 10.7 cm.  3.  Subcutaneous calcifications in the right ankle demonstrated,  unchanged from prior PET/CT.    I have personally reviewed the examination and initial interpretation  and I agree with the findings.    HERI HELTON MD         SYSTEM ID:  [id number]   CT Abdomen Pelvis w/o Contrast    Narrative    EXAM: CT abdomen and pelvis without intravenous contrast. 4/16/2025  12:12 PM    HISTORY: suprapubic pain with recurrent utis       TECHNIQUE: Helical acquisition of image data was performed for the  abdomen and pelvis without intravenous contrast. Multiplanar  reconstructions were performed and reviewed.    CONTRAST: None.     COMPARISON: CT abdomen/pelvis 3/8/2025, CT chest, abdomen and pelvis  1/22/2025    FINDINGS:    Lower thorax: Small bilateral pleural effusions with adjacent  atelectasis. Similar degree of right chronic calcified opacities in  the right greater than left lower lobe. Interlobular septal thickening  at the bases. Reticular opacities peripherally in the lingula and  right middle lobe. Aortic and mitral annuli calcifications.  Multivessel  coronary artery atherosclerotic calcifications.     Liver: Postsurgical changes of partial liver transplantation.  Unchanged curvilinear hypodensity in the inferior right lobe.    Gallbladder/biliary tree: The common bile duct is not dilated. Status  post cholecystectomy.     Pancreas: Diffuse fatty atrophic changes without ductal dilatation.  Stable 1.2 cm likely cyst of the pancreatic head.    Spleen: The spleen is not enlarged.    Adrenal glands: No adrenal nodules.    Kidneys/ureters: Nonspecific bilateral perinephric fat stranding.  Atrophic changes bilaterally. Left nephrolithiasis measuring up to 4  mm, versus vascular calcification bilaterally. No hydronephrosis.  Unchanged indeterminate density exophytic left-sided renal cysts.    Stomach: Unremarkable.    Bladder/pelvic organs: Distended bladder within normal limits.    Bowel/mesentery: No dilated loops of small bowel or colon. The  appendix is surgically absent. Colonic diverticulosis. There is trace  free fluid about the sigmoid colon and mild presacral edema. Increased  size of a soft tissue/fluid density posterior to the sigmoid colon  containing a focus of air (5/343), measuring 2.3 x 1.7 cm, previously  1.6 x 1.0 cm which previously demonstrated a focus of air.     Peritoneum/retroperitoneum: No extraluminal bowel gas. No free fluid  in the abdomen or pelvis.    Lymph nodes: No lymphadenopathy.    Major vessels: No aneurysmal dilatation. Scattered atherosclerotic  calcifications.    Soft tissues: No acute soft tissue abnormality.    Osseous structures: Degenerative changes of the spine. No acute or  suspicious osseous abnormality.      Impression    IMPRESSION:  1.  New fat stranding and mesenteric edema about the distal sigmoid  colon in the midline, nonspecific, but may represent diverticulitis.  Suspected contained microperforation with small fluid collection  containing tiny focus of air posterior to the sigmoid colon.  2.  Distended bladder  within normal limits.  3.  Unchanged fluid density 1.2 cm lesion in the region of the  pancreatic head.  4.  Stable postsurgical changes of liver transplant and curvilinear  hypodensity in the inferior right lobe.  5.  Other chronic CT findings including small bilateral pleural  effusions and right greater than left basilar calcified opacities are  unchanged.    I have personally reviewed the examination and initial interpretation  and I agree with the findings.    HERI HELTON MD         SYSTEM ID:  [id number]     *Note: Due to a large number of results and/or encounters for the requested time period, some results have not been displayed. A complete set of results can be found in Results Review.       Discharge Medications   Current Discharge Medication List        START taking these medications    Details   dextrose 50 % injection Inject 25-50 mLs over 1-5 minutes into the vein every 15 minutes as needed for low blood sugar.    Associated Diagnoses: Type 2 diabetes mellitus with stage 3b chronic kidney disease, without long-term current use of insulin (H)      !! diclofenac (VOLTAREN) 1 % topical gel Apply 4 g topically 3 times daily as needed for moderate pain.    Associated Diagnoses: Other osteoporosis without current pathological fracture      !! diclofenac (VOLTAREN) 1 % topical gel Apply 4 g topically 4 times daily.    Associated Diagnoses: Osteoarthritis of shoulder, unspecified laterality, unspecified osteoarthritis type      Glucagon (GVOKE HYPOPEN) 1 MG/0.2ML pen Inject the contents of 1 device under the skin into lower abdomen, outer thigh, or outer upper arm as needed for hypoglycemia. If no response after 15 minutes, additional 1 mg dose from a new device may be injected while waiting for emergency assistance.    Associated Diagnoses: Type 2 diabetes mellitus with stage 3b chronic kidney disease, without long-term current use of insulin (H)      glucose 40 % (400 mg/mL) gel Take 15-30 g by mouth  every 15 minutes as needed for low blood sugar.    Associated Diagnoses: Type 2 diabetes mellitus with stage 3b chronic kidney disease, without long-term current use of insulin (H)      !! insulin aspart (NOVOLOG PEN) 100 UNIT/ML pen Inject 1-7 Units subcutaneously 3 times daily (before meals).    Associated Diagnoses: Type 2 diabetes mellitus with stage 3b chronic kidney disease, without long-term current use of insulin (H)      !! insulin aspart (NOVOLOG PEN) 100 UNIT/ML pen Inject 1-5 Units subcutaneously at bedtime.    Associated Diagnoses: Type 2 diabetes mellitus with stage 3b chronic kidney disease, without long-term current use of insulin (H)      !! Lidocaine (LIDOCARE) 4 % Patch Place 1 patch over 12 hours onto the skin daily as needed for moderate pain. To prevent lidocaine toxicity, patient should be patch free for 12 hrs daily.    Associated Diagnoses: Osteoarthritis of shoulder, unspecified laterality, unspecified osteoarthritis type      !! Lidocaine (LIDOCARE) 4 % Patch Place 2 patches over 12 hours onto the skin daily as needed. To prevent lidocaine toxicity, patient should be patch free for 12 hrs daily.    Associated Diagnoses: Osteoarthritis of shoulder, unspecified laterality, unspecified osteoarthritis type      lidocaine (LMX4) 4 % external cream Apply topically every hour as needed for pain (with VAD insertion).    Associated Diagnoses: Osteoarthritis of shoulder, unspecified laterality, unspecified osteoarthritis type      lidocaine 1 % SOLN 0.1-1 mLs by Other route every hour as needed (mild pain with VAD insertion).    Associated Diagnoses: Osteoarthritis of shoulder, unspecified laterality, unspecified osteoarthritis type      loperamide (IMODIUM) 2 MG capsule Take 1 capsule (2 mg) by mouth 4 times daily as needed for diarrhea.    Associated Diagnoses: Sepsis without acute organ dysfunction, due to unspecified organism (H)      melatonin 1 MG TABS tablet Take 1 tablet (1 mg) by mouth  nightly as needed for sleep.    Associated Diagnoses: Insomnia, unspecified type      !! omega-3 acid ethyl esters (LOVAZA) 1 g capsule Take 1 capsule (1 g) by mouth 2 times daily.    Associated Diagnoses: Chronic kidney disease, stage 4 (severe) (H)      ondansetron (ZOFRAN ODT) 4 MG ODT tab Take 1 tablet (4 mg) by mouth every 6 hours as needed.    Associated Diagnoses: Sepsis without acute organ dysfunction, due to unspecified organism (H)       !! - Potential duplicate medications found. Please discuss with provider.        CONTINUE these medications which have NOT CHANGED    Details   acetaminophen (TYLENOL) 500 MG tablet Take 1,000 mg by mouth 2 times daily. During 4/16/25 [facility] pt states take BID everyday      amLODIPine (NORVASC) 2.5 MG tablet Take 1 tablet (2.5 mg) by mouth daily.  Qty: 90 tablet, Refills: 3    Comments: Patient to call when ready to fill  Associated Diagnoses: Benign essential hypertension      apixaban ANTICOAGULANT (ELIQUIS) 5 MG tablet Take 1 tablet (5 mg) by mouth 2 times daily.  Qty: 180 tablet, Refills: 1    Comments: NEW TABLET STRENGTH, PATIENT AWARE (OK to use up current supply of 2.5 mg tablets by taking 2 tablets in the morning and 2 tablets in the evening).  Associated Diagnoses: PAF (paroxysmal atrial fibrillation) (H)      calcitRIOL (ROCALTROL) 0.25 MCG capsule Take 1 capsule (0.25 mcg) by mouth daily.  Qty: 90 capsule, Refills: 1    Associated Diagnoses: Papillary thyroid carcinoma (H)      D-MANNOSE PO Take 1 Scoop by mouth 2 times daily.      diphenhydrAMINE (BENADRYL) 25 MG tablet Take 25 mg by mouth every 6 hours as needed for itching or allergies.      EPINEPHrine (ANY BX GENERIC EQUIV) 0.3 MG/0.3ML injection 2-pack Inject 0.3 mLs (0.3 mg) into the muscle as needed for anaphylaxis. May repeat one time in 5-15 minutes if response to initial dose is inadequate.  Qty: 2 each, Refills: 1    Associated Diagnoses: Anaphylaxis, sequela      ERTAPENEM SODIUM IV Inject 1 g  into the vein daily.      estradiol (ESTRACE) 0.1 MG/GM vaginal cream Place vaginally every other day.      evolocumab (REPATHA SURECLICK) 140 MG/ML prefilled autoinjector Inject 1 mL (140 mg) subcutaneously every 14 days. . Please have fasting labs drawn for further refills.  Qty: 6 mL, Refills: 3    Associated Diagnoses: Hyperlipidemia LDL goal <70; Statin intolerance; HDL deficiency; Type 2 diabetes mellitus with stage 3 chronic kidney disease, without long-term current use of insulin (H); Hypertriglyceridemia; H/O: CVA (cerebrovascular accident)      ezetimibe (ZETIA) 10 MG tablet Take 1 tablet (10 mg) by mouth daily.  Qty: 90 tablet, Refills: 3    Associated Diagnoses: Benign essential hypertension; Hyperlipidemia LDL goal <70      Ferrous Sulfate 324 MG TBEC Take 1 tablet by mouth daily.      folic acid (FOLVITE) 1 MG tablet TAKE 1 TABLET BY MOUTH DAILY  Qty: 90 tablet, Refills: 3    Comments: Please send a replace/new response with 90-Day Supply if appropriate to maximize member benefit. Requesting 1 year supply.  Associated Diagnoses: Liver replaced by transplant (H)      gabapentin (NEURONTIN) 250 MG/5ML solution Take 6 mLs (300 mg) by mouth at bedtime  Qty: 180 mL, Refills: 0    Associated Diagnoses: Liver replaced by transplant (H)      glucose (BD GLUCOSE) 5 g chewable tablet Take 2 tablets (10 g) by mouth as needed (low blood sugar)  Qty: 40 tablet, Refills: 1    Associated Diagnoses: Type 2 diabetes mellitus with stage 3 chronic kidney disease, without long-term current use of insulin (H)      hydrALAZINE (APRESOLINE) 50 MG tablet Take 1 tablet (50 mg) by mouth 3 times daily. Hold for SBP <120mmHg if having diarrhea, otherwise hold if SBP <110mmHg.  Qty: 270 tablet, Refills: 3    Comments: Patient will call when ready to fill  Associated Diagnoses: Benign essential hypertension      ketoconazole (NIZORAL) 2 % external shampoo Apply topically three times a week. Lather in the shower, wait 3-5 minutes  before rinsing.  Qty: 100 mL, Refills: 11    Associated Diagnoses: Seborrheic dermatitis      levothyroxine (SYNTHROID/LEVOTHROID) 175 MCG tablet Take 1 tablet (175 mcg) by mouth daily.  Qty: 90 tablet, Refills: 1    Associated Diagnoses: Postoperative hypothyroidism      linagliptin (TRADJENTA) 5 MG TABS tablet Take 1 tablet (5 mg) by mouth daily.  Qty: 90 tablet, Refills: 1    Associated Diagnoses: Postoperative hypothyroidism; Papillary thyroid carcinoma (H); Type 2 diabetes mellitus with stage 3 chronic kidney disease, without long-term current use of insulin (H)      meclizine (ANTIVERT) 25 MG tablet Take 1 tablet (25 mg) by mouth as needed for dizziness or other (migraines)  Qty: 30 tablet, Refills: 11    Associated Diagnoses: Dizziness      metoprolol succinate ER (TOPROL XL) 25 MG 24 hr tablet Take 1 tablet (25 mg) by mouth daily.      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 Momma Bear Nerve Lotion - pt uses on feet      Nutrisource Fiber PO packet Take 1 packet by mouth daily. Benefiber      !! omega-3 acid ethyl esters (LOVAZA) 1 g capsule Take 1 capsule by mouth 2 times daily.  Qty: 180 capsule, Refills: 1    Associated Diagnoses: Hypertriglyceridemia      predniSONE (DELTASONE) 10 MG tablet Take 1 tablet (10 mg) by mouth daily.  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 Use 2 weeks or less.  Qty: 80 g, Refills: 0    Associated Diagnoses: Stasis dermatitis of both legs      ursodiol (ACTIGALL) 250 MG tablet Take 1 tablet (250 mg) by mouth 2 times daily.  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  Qty: 2 each, Refills: 5    Comments: Medicare B  Associated Diagnoses: Postoperative hypothyroidism; Papillary thyroid carcinoma (H); Type 2 diabetes mellitus with stage 3 chronic kidney disease, without long-term current use of insulin (H)      HYDROcodone-acetaminophen (NORCO) 5-325 MG tablet Take 1 tablet by mouth every 6 hours as needed for breakthrough pain. Has not used at home (dispensed 7 tablets on 4/8/25)       !! - Potential duplicate medications found. Please discuss with provider.        Allergies   Allergies   Allergen Reactions    Fluconazole Hives and Itching     Full body hives  **Intradermal skin testing negative**  [See intradermal skin testing results from 8/2/2019]    Mycophenolate Diarrhea and Nausea and Vomiting     Patient stated it was chronic and lasted months      Penicillins Anaphylaxis, Hives, Itching and Rash     **Intradermal skin testing negative**  [See intradermal skin testing results from 8/2/2019]      Simvastatin Muscle Pain (Myalgia)     severe  Other reaction(s): Myalgia caused by statin    Methotrexate Other (See Comments)     Other reaction(s): Sore  Sores in mouth, esophagus, and stomach.       Morphine And Codeine Itching and Other (See Comments)     Psych disturbance  Other reaction(s): Confusion, Mood alteration    Quinolones Anxiety, Dizziness, Headache, Other (See Comments), Palpitations and Unknown     Other reaction(s):  Hyperactive behavior, Lightheadedness, Mood alteration    Dizzy, light headed    Dizziness, shaky, and jumpy    Capsules, Empty Gelatin [Gelatin]     Carvedilol Diarrhea     Per pt - severe diarrhea and LE swelling  + tolerating Toprol    Lansoprazole Diarrhea    Strawberry Extract     Azithromycin Itching     [See intradermal skin testing results from 8/2/2019]    Bactrim [Sulfamethoxazole-Trimethoprim] Other (See Comments)     Numb mouth, tingling lips (treated with anti-histamines)    Cephalosporins Itching     [See intradermal skin testing results from 8/2/2019]    Ciprofloxacin Hcl Other (See Comments) and Dizziness     Insomnia, mood lability, Irregular heart beat         Doxycycline Itching and Unknown     [See intradermal skin testing results from 8/2/2019]    Lisinopril Cough    Omeprazole Itching    Tolectin [Nsaids] Rash    Tolmetin Rash and Itching    Tramadol Rash, Hives and Itching

## 2025-04-23 NOTE — PLAN OF CARE
Goal Outcome Evaluation:    Plan of Care Reviewed With: patient    Overall Patient Progress: no change  Overall Patient Progress: no change    Assumed cares from 2137-6925. Pt is alert and oriented x4. Calm and cooperative. Vitally stable on 2L NC overnight. R midline SL. Pt up with assist of 1 w/ walker. Regular diet. L cheek incision site TATO. Able to make needs known.

## 2025-04-23 NOTE — PHARMACY
[facility]  Parenteral ANtibiotic Review at Departure from Acute Care Collaborative Note     Patient: Luz Thompson  MRN: [id number]  Allergies: Fluconazole; Mycophenolate; Penicillins; Simvastatin; Methotrexate; Morphine and codeine; Quinolones; Capsules, empty gelatin [gelatin]; Carvedilol; Lansoprazole; Strawberry extract; Azithromycin; Bactrim [sulfamethoxazole-trimethoprim]; Cephalosporins; Ciprofloxacin hcl; Doxycycline; Lisinopril; Omeprazole; Tolectin [nsaids]; Tolmetin; and Tramadol    Current Location:  7C  OPAT to be provided by: External Facility (TCU, ARU, LTACH)  [facility]    Line Type: Midline    Diagnosis/Indications: Diverticulitis with a contained microperforation  Organism(s): Empiric, 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 Del Rio, OPAT plan charted on 4/17/2025.    Outpatient ID Follow-up: ID OPAT Clinic Referral Placed ([facility] Ph: [phone] and Fax: [phone], For LAB RESULTS ONLY, please either fax [phone] or email [email or web address]) - appointment scheduled with Dr. Lundberg 4/28/2025.  Designated Provider: Dr. Sindhu Del Rio    Antimicrobial Regimen / Route Anticipated  Duration Start Date Stop /  Reassess Date   Ertapenem 1 g every 24 hours/IV 2-4 weeks dependent on repeat imaging 4/16/2025 Tentative 5/8/2025, defintive end date to be determined by ID provider     Laboratory Tests and Monitoring Frequency: SCr, ALT, AST, CRP, CBC with Diff Once Weekly    Imaging/Miscellaneous Monitoring: Repeat CT abdomen and pelvis w/o contrast during the week of 5/5    ID Pharmacist Interventions: Kavon Santamaria  Jeromy Alexander, BCIDP  Pager: [phone]

## 2025-04-24 ENCOUNTER — DOCUMENTATION ONLY (OUTPATIENT)
Dept: OTHER | Facility: CLINIC | Age: 75
End: 2025-04-24
Payer: MEDICARE

## 2025-04-24 ENCOUNTER — PATIENT OUTREACH (OUTPATIENT)
Dept: CARE COORDINATION | Facility: CLINIC | Age: 75
End: 2025-04-24
Payer: MEDICARE

## 2025-04-24 VITALS
DIASTOLIC BLOOD PRESSURE: 61 MMHG | WEIGHT: 181 LBS | TEMPERATURE: 98.2 F | HEIGHT: 67 IN | HEART RATE: 63 BPM | RESPIRATION RATE: 18 BRPM | SYSTOLIC BLOOD PRESSURE: 128 MMHG | OXYGEN SATURATION: 95 % | BODY MASS INDEX: 28.41 KG/M2

## 2025-04-24 PROBLEM — E11.621 ULCER OF FOOT DUE TO TYPE 2 DIABETES MELLITUS (H): Status: RESOLVED | Noted: 2025-04-02 | Resolved: 2025-04-24

## 2025-04-24 PROBLEM — L97.509 ULCER OF FOOT DUE TO TYPE 2 DIABETES MELLITUS (H): Status: RESOLVED | Noted: 2025-04-02 | Resolved: 2025-04-24

## 2025-04-24 PROBLEM — N18.4 CHRONIC KIDNEY DISEASE, STAGE 4 (SEVERE) (H): Status: RESOLVED | Noted: 2025-04-02 | Resolved: 2025-04-24

## 2025-04-24 PROCEDURE — 86481 TB AG RESPONSE T-CELL SUSP: CPT | Performed by: NURSE PRACTITIONER

## 2025-04-24 PROCEDURE — 36415 COLL VENOUS BLD VENIPUNCTURE: CPT | Performed by: NURSE PRACTITIONER

## 2025-04-24 NOTE — PROGRESS NOTES
Clinic Care Coordination Contact  Care Coordination Transition Communication    Clinical Data: Patient was hospitalized at [facility] from 4/14 to 4/23/25 with diagnosis of sepsis 2/2 intraabdominal infection.     Assessment: Patient has transitioned to TCU/ARU for short term rehabilitation:    Facility Name: [facility]  Transition Communication:  Notified facility of Primary Care- Care Coordination support via Epic fax.    Plan: Care Coordinator will await notification from facility staff informing of patient's discharge plans/needs. Care Coordinator will review chart and outreach to facility staff every 4 weeks and as needed.     Alpa Cho RN, BSN, CPHN, [facility]  [facility], and [facility]  [email or web address]  Office: [phone]  Employed by [facility] [employer]

## 2025-04-24 NOTE — LETTER
[facility]   To:             Please give to facility    From:   Alpa Cho RN  Care Coordinator   [facility]   P: [phone]  [email or web address]   Patient Name:  Luz Thompson YOB: 1950   Admit date: 4/23/25      *Information Needed:  Please contact me when the patient will discharge (or if they will move to long term care)- include the discharge date, disposition, and main diagnosis   If the patient is discharged with home care services, please provide the name of the agency    Also- Please inform me if a care conference is being held.   Phone, Fax or Email with information                      Thank you    Electronically signed

## 2025-04-24 NOTE — PROGRESS NOTES
[facility] GERIATRICS    PRIMARY CARE PROVIDER AND CLINIC:  Daniel Hidalgo MD, [address] [facility] [address]  Chief Complaint   Patient presents with    Hospital F/U      [place] [facility] Record Number:  [id number]  Place of Service where encounter took place:  [facility] (TCU)(SNF) [[id number]]    Luz Thompson  is a 75 year old  (1950), admitted to the above facility from  [facility]. Hospital stay 4/14/25 through 4/23/25..   HPI:    Luz Thompson is a 75 year old female admitted on 4/14/2025. 76 yo w/ PMHx of primary biliary cirrhosis s/p liver transplant in 2002, paroxysmal atrial fibrillation on apixaban, HTN, T2DM, CKD, HF, hx of CVA, hypothyroidism, recurrent ESBL UTIs with recent E. Faecalis bacteremia admission last month on IV ertapenem via PICC admitted for generalized weakness likely 2/2 infection. Found to have intraabdominal infection secondary to a sigmoid microperforation.         CODE STATUS/ADVANCE DIRECTIVES DISCUSSION:  Full Code  CPR/Full code   ALLERGIES:   Allergies   Allergen Reactions    Fluconazole Hives and Itching     Full body hives  **Intradermal skin testing negative**  [See intradermal skin testing results from 8/2/2019]    Mycophenolate Diarrhea and Nausea and Vomiting     Patient stated it was chronic and lasted months      Penicillins Anaphylaxis, Hives, Itching and Rash     **Intradermal skin testing negative**  [See intradermal skin testing results from 8/2/2019]      Simvastatin Muscle Pain (Myalgia)     severe  Other reaction(s): Myalgia caused by statin    Methotrexate Other (See Comments)     Other reaction(s): Sore  Sores in mouth, esophagus, and stomach.       Morphine And Codeine Itching and Other (See Comments)     Psych disturbance  Other reaction(s): Confusion, Mood alteration    Quinolones Anxiety, Dizziness, Headache, Other (See Comments), Palpitations and Unknown  "    Other reaction(s): Hyperactive behavior, Lightheadedness, Mood alteration    Dizzy, light headed    Dizziness, shaky, and jumpy    Capsules, Empty Gelatin [Gelatin]     Carvedilol Diarrhea     Per pt - severe diarrhea and LE swelling  + tolerating Toprol    Lansoprazole Diarrhea    Strawberry Extract     Azithromycin Itching     [See intradermal skin testing results from 8/2/2019]    Bactrim [Sulfamethoxazole-Trimethoprim] Other (See Comments)     Numb mouth, tingling lips (treated with anti-histamines)    Cephalosporins Itching     [See intradermal skin testing results from 8/2/2019]    Ciprofloxacin Hcl Other (See Comments) and Dizziness     Insomnia, mood lability, Irregular heart beat         Doxycycline Itching and Unknown     [See intradermal skin testing results from 8/2/2019]    Lisinopril Cough    Omeprazole Itching    Tolectin [Nsaids] Rash    Tolmetin Rash and Itching    Tramadol Rash, Hives and Itching      PAST MEDICAL HISTORY:   Past Medical History:   Diagnosis Date    Anemia of chronic disease 10/17/2011    Anxiety     CKD (chronic kidney disease) stage 3, GFR 30-59 ml/min (H) 04/04/2012    Coccidioidomycosis, 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  FAMILY HISTORY: family history includes Allergies in her son; Alzheimer Disease in her paternal grandfather; Breast Cancer in an other family member; Cancer - colorectal in her maternal grandmother; Cerebrovascular Disease in her paternal grandmother; Endometrial Cancer in her mother; Glaucoma in her maternal grandfather; Heart Disease in her maternal grandfather and paternal grandfather; Hyperlipidemia in her mother; Hypertension in her mother and paternal grandmother; Macular Degeneration in her father; Neurologic Disorder in her daughter; Prostate Cancer in her father.  SOCIAL HISTORY:   reports that she quit smoking about 40 years ago. Her smoking use included cigarettes. She started smoking about 58 years ago. She has a 18 pack-year smoking history. She has never used smokeless tobacco. She reports current alcohol use. She reports that she does not use drugs.  Patient's living condition: lives alone    Post Discharge Medication Reconciliation Status:   MED REC REQUIRED  Post Medication Reconciliation Status:  Discharge medications  reconciled and changed, see notes/orders       Current Outpatient Medications   Medication Sig Dispense Refill    acetaminophen (TYLENOL) 500 MG tablet Take 1,000 mg by mouth 2 times daily. During 4/16/25 [facility] pt states take BID everyday      amLODIPine (NORVASC) 2.5 MG tablet Take 1 tablet (2.5 mg) by mouth daily. 90 tablet 3    apixaban ANTICOAGULANT (ELIQUIS) 5 MG tablet Take 1 tablet (5 mg) by mouth 2 times daily. 180 tablet 1    calcitRIOL (ROCALTROL) 0.25 MCG capsule Take 1 capsule (0.25 mcg) by mouth daily. 90 capsule 1    Continuous Glucose Sensor (FREESTYLE NINO 2 SENSOR) MISC Change every 14 days. 2 each 5    D-MANNOSE PO Take 1 Scoop by mouth 2 times daily.      dextrose 50 % injection Inject 25-50 mLs over 1-5 minutes into the vein every 15 minutes as needed for low blood sugar.      diclofenac (VOLTAREN) 1 % topical gel Apply 4 g topically 3 times daily as needed for moderate pain.      diclofenac (VOLTAREN) 1 % topical gel Apply 4 g topically 4 times daily.      diphenhydrAMINE (BENADRYL) 25 MG tablet Take 25 mg by mouth every 6 hours as needed for itching or allergies.      EPINEPHrine (ANY BX GENERIC EQUIV) 0.3 MG/0.3ML injection 2-pack Inject 0.3 mLs (0.3 mg) into the muscle as needed for anaphylaxis. May repeat one time in 5-15 minutes if response to initial dose is inadequate. 2 each 1    ERTAPENEM SODIUM IV Inject 1 g into the vein daily.      estradiol (ESTRACE) 0.1 MG/GM vaginal cream Place vaginally every other day.      evolocumab (REPATHA SURECLICK) 140 MG/ML prefilled autoinjector Inject 1 mL (140 mg) subcutaneously every 14 days. . Please have fasting labs drawn for further refills. 6 mL 3    ezetimibe (ZETIA) 10 MG tablet Take 1 tablet (10 mg) by mouth daily. 90 tablet 3    Ferrous Sulfate 324 MG TBEC Take 1 tablet by mouth daily.      folic acid (FOLVITE) 1 MG tablet TAKE 1 TABLET BY MOUTH DAILY 90 tablet 3    gabapentin (NEURONTIN) 250 MG/5ML solution Take 6 mLs (300 mg) by mouth  at bedtime 180 mL 0    Glucagon (GVOKE HYPOPEN) 1 MG/0.2ML pen Inject the contents of 1 device under the skin into lower abdomen, outer thigh, or outer upper arm as needed for hypoglycemia. If no response after 15 minutes, additional 1 mg dose from a new device may be injected while waiting for emergency assistance.      glucose (BD GLUCOSE) 5 g chewable tablet Take 2 tablets (10 g) by mouth as needed (low blood sugar) 40 tablet 1    glucose 40 % (400 mg/mL) gel Take 15-30 g by mouth every 15 minutes as needed for low blood sugar.      hydrALAZINE (APRESOLINE) 50 MG tablet Take 1 tablet (50 mg) by mouth 3 times daily. Hold for SBP <120mmHg if having diarrhea, otherwise hold if SBP <110mmHg. 270 tablet 3    insulin aspart (NOVOLOG PEN) 100 UNIT/ML pen Inject 1-7 Units subcutaneously 3 times daily (before meals).      insulin aspart (NOVOLOG PEN) 100 UNIT/ML pen Inject 1-5 Units subcutaneously at bedtime.      ketoconazole (NIZORAL) 2 % external shampoo Apply topically three times a week. Lather in the shower, wait 3-5 minutes before rinsing. 100 mL 11    levothyroxine (SYNTHROID/LEVOTHROID) 175 MCG tablet Take 1 tablet (175 mcg) by mouth daily. 90 tablet 1    Lidocaine (LIDOCARE) 4 % Patch Place 1 patch over 12 hours onto the skin daily as needed for moderate pain. To prevent lidocaine toxicity, patient should be patch free for 12 hrs daily.      Lidocaine (LIDOCARE) 4 % Patch Place 2 patches over 12 hours onto the skin daily as needed. To prevent lidocaine toxicity, patient should be patch free for 12 hrs daily.      lidocaine (LMX4) 4 % external cream Apply topically every hour as needed for pain (with VAD insertion).      lidocaine 1 % SOLN 0.1-1 mLs by Other route every hour as needed (mild pain with VAD insertion).      linagliptin (TRADJENTA) 5 MG TABS tablet Take 1 tablet (5 mg) by mouth daily. 90 tablet 1    loperamide (IMODIUM) 2 MG capsule Take 1 capsule (2 mg) by mouth 4 times daily as needed for diarrhea.  "     meclizine (ANTIVERT) 25 MG tablet Take 1 tablet (25 mg) by mouth as needed for dizziness or other (migraines) 30 tablet 11    melatonin 1 MG TABS tablet Take 1 tablet (1 mg) by mouth nightly as needed for sleep.      metoprolol succinate ER (TOPROL XL) 25 MG 24 hr tablet Take 1 tablet (25 mg) by mouth daily.      Multiple Vitamins-Minerals (PRESERVISION AREDS 2) CAPS Take 1 capsule by mouth 2 times daily      NONFORMULARY Apply 1 Application topically daily as needed. Momma Bear Nerve Lotion - pt uses on feet      Nutrisource Fiber PO packet Take 1 packet by mouth daily. Benefiber      omega-3 acid ethyl esters (LOVAZA) 1 g capsule Take 1 capsule (1 g) by mouth 2 times daily.      omega-3 acid ethyl esters (LOVAZA) 1 g capsule Take 1 capsule by mouth 2 times daily. 180 capsule 1    ondansetron (ZOFRAN ODT) 4 MG ODT tab Take 1 tablet (4 mg) by mouth every 6 hours as needed.      predniSONE (DELTASONE) 10 MG tablet Take 1 tablet (10 mg) by mouth daily. 90 tablet 3    sertraline (ZOLOFT) 25 MG tablet Take 1 tablet (25 mg) by mouth daily for 7 days, THEN 2 tablets (50 mg) daily.      sirolimus (GENERIC EQUIVALENT) 1 MG tablet Take 1 tablet (1 mg) by mouth daily. 90 tablet 3    triamcinolone (KENALOG) 0.1 % external ointment Apply topically 2 times daily. Use 2 weeks or less. 80 g 0    ursodiol (ACTIGALL) 250 MG tablet Take 1 tablet (250 mg) by mouth 2 times daily. 180 tablet 3     No current facility-administered medications for this visit.       ROS:  10 point ROS of systems including Constitutional, Eyes, Respiratory, Cardiovascular, Gastroenterology, Genitourinary, Integumentary, Musculoskeletal, Psychiatric were all negative except for pertinent positives noted in my HPI.    Vitals:  /61   Pulse 63   Temp 98.2  F (36.8  C)   Resp 18   Ht 1.702 m (5' 7\")   Wt 82.1 kg (181 lb)   LMP 06/01/1988 (Approximate)   SpO2 95%   BMI 28.35 kg/m    Exam:  GENERAL APPEARANCE:  {[facility] GENER[facility] " "APPEARANCE:520124}  ENT:  {[facility] E[facility] PE:286382::\"Mouth and posterior oropharynx normal, moist mucous membranes\"}  EYES:  {[facility] EYES PE :486860::\"EOM, conjunctivae, lids, pupils and irises normal\"}  RESP:  {[facility] RESP PE:304146}  CV:  {[facility] CV PE:681817}  ABDOMEN:  {[facility] GI PE:523257::\"normal bowel sounds, soft, nontender, no hepatosplenomegaly or other masses\"}  M/S:   {[facility] M/S PE:771740}  SKIN:  {[facility] SKIN PE:485086}  NEURO:   {[facility] NEURO PE:604783}  PSYCH:  {[facility] PSYCH PE:909774}    Lab/Diagnostic data:  Most Recent 3 CBC's:  Recent Labs   Lab Test 04/20/25  0637 04/19/25  0714 04/18/25  0618   WBC 8.3 6.0 7.1   HGB 8.6* 8.7* 8.7*   MCV 87 92 90    383 347     Most Recent 3 BMP's:  Recent Labs   Lab Test 04/23/25  0754 04/23/25  0156 04/22/25  2124 04/20/25  0950 04/20/25  0637 04/19/25  0834 04/19/25  0714 04/18/25  0852 04/18/25  0618   NA  --   --   --   --  143  --  144  --  141   POTASSIUM  --   --   --   --  4.2  --  4.4  --  4.2   CHLORIDE  --   --   --   --  110*  --  111*  --  109*   CO2  --   --   --   --  23  --  24  --  22   BUN  --   --   --   --  35.6*  --  33.5*  --  35.3*   CR  --   --   --   --  1.33*  --  1.32*  --  1.40*   ANIONGAP  --   --   --   --  10  --  9  --  10   KEILY  --   --   --   --  8.8  --  8.8  --  8.7*   * 156* 236*   < > 113*   < > 116*   < > 122*    < > = values in this interval not displayed.     Most Recent 2 LFT's:  Recent Labs   Lab Test 04/20/25  0637 04/19/25  0714   AST 27 59*   ALT 50 75*   ALKPHOS 97 104   BILITOTAL <0.2 <0.2     Most Recent Cholesterol Panel:  Recent Labs   Lab Test 09/04/24  1008   CHOL 291*   *   HDL 72   TRIG 329*     7-Day Micro Results       Collected Updated Procedure Result Status      04/24/2025 1005 04/24/2025 1032 Quantiferon TB Gold Plus Grey Tube [[id number]]   Blood from Arm, Left    In process Component Value   No component results            " 04/24/2025 1005 04/24/2025 1032 Quantiferon TB Gold Plus Green Tube [[id number]]   Blood from Arm, Left    In process Component Value   No component results            04/24/2025 1005 04/24/2025 1032 Quantiferon TB Gold Plus Yellow Tube [[id number]]   Blood from Arm, Left    In process Component Value   No component results            04/24/2025 1005 04/24/2025 1032 Quantiferon TB Gold Plus Purple Tube [[id number]]   Blood from Arm, Left    In process Component Value   No component results                  Most Recent TSH and T4:  Recent Labs   Lab Test 04/04/25  1159 01/20/25  1635 11/14/24  1506   TSH 1.72   < > 3.31   T4  --   --  1.92*    < > = values in this interval not displayed.     Most Recent Hemoglobin A1c:  Recent Labs   Lab Test 04/14/25  1701   A1C 6.8*     Most Recent Urinalysis:  Recent Labs   Lab Test 04/14/25  1430 04/07/25  0945 04/07/25  0935   COLOR Light Yellow   < > Light Yellow   APPEARANCE Slightly Cloudy*   < > Slightly Cloudy*   URINEGLC Negative   < > Negative   URINEBILI Negative   < > Negative   URINEKETONE Negative   < > Negative   SG 1.015   < > 1.007   UBLD Negative   < > Trace*   URINEPH 5.5   < > 5.5   PROTEIN 20*   < > 10*   UROBILINOGEN  --   --  0.2   NITRITE Negative   < > Negative   LEUKEST Moderate*   < > Large*   RBCU 2   < > 2-5*   WBCU 85*   < > >100*    < > = values in this interval not displayed.     Most Recent ESR & CRP:  Recent Labs   Lab Test 04/15/25  0613 04/14/25  1701 06/13/23  1820 08/26/19  2331   SED 61*  --   --  78*   CRP  --   --   --  180.0*   CRPI  --  21.40*   < >  --     < > = values in this interval not displayed.     Most Recent Anemia Panel:  Recent Labs   Lab Test 04/20/25  0637 04/14/25  1410 04/07/25  0945 09/04/24  1008 06/15/23  1028   WBC 8.3   < > 6.5   < > 7.6   HGB 8.6*   < > 9.1*   < > 12.1   HCT 28.5*   < > 30.0*   < > 38.4   MCV 87   < > 88   < > 92      < > 351   < > 345   IRON  --   --  36*   < >  --    IRONSAT  --   --   13*   < >  --    FEB  --   --  277   < >  --    LAURA  --   --  44   < >  --    B12  --   --   --   --  456    < > = values in this interval not displayed.       ASSESSMENT/PLAN:  sepsis 2/2 intraabdominal infection - resolved  Recurrent UTIs on IV ertapenem since 3/9/2024  Recent Hx of E. Faecalis bacteremia  Sigmoid microperforation  Comment: Admitted for generalized weakness likely 2/2 infection. Found to have intraabdominal infection secondary to a sigmoid microperforation. Recent admission in March 2025 for Klebsiella pneumonia UTI and E. Faecalis bacteremia with recommendations from ID for ertapenem 1g q24h until EOT 4/22. RVP negative.  Plan:  -Follow up with ID as directed. Scheduled for Monday 4/28/25  -Continue vaginal estradiol MWF  -Change midline IV access every week and PRN  -Monitor for worsening s/symptoms of concerns  -Continue IV ertapenem 1gm daily as directed. Due for 2-4 weeks to be determined by ID. Estimated until 5/8/25.   -CMP, CBC with diff and CRP due weekly on Mondays starting 4/28/25 until 5/12/25. Fax results to [phone] AUSTYN Del Rio     Liver transplant in 2002 2/2 primary biliary cirrhosis  Comment: Chronic. Follows Dr. Ramirez.   Plan:  -Continue PTA sirolimus 1 mg qD  -Continue PTA prednisone 10 mg qD  -Continue PTA ursodiol 250 mg BID  -Follow up with transplant team as directed  -CMP, CBC with diff and CRP due weekly on Mondays starting 4/28/25 until 5/12/25. Fax results to [phone] AUSTYN Del Rio    Depression   Comment: Chronic. Reports feeling more down and depressed with little interest in doing things. Has a therapist in outpatient setting and has been on anti-depressants historically.   Plan:  -Monitor mood and behaviors  -Monitor for worsening s/symptoms of concerns  -Monitor for changes in mobility, eating and sleeping patterns  -Restart per psych recommendations prior to hospital discharge: Zoloft 25 mg x 7 days, 50 mg every day there after  -Follow  up with psych post TCU  -CMP, CBC with diff and CRP due weekly on Mondays starting 4/28/25 until 5/12/25. Fax results to [phone] AUSTYN Del Rio     Mild Obstructive Sleep Apnea   Nocturnal Hypoxemia   Comment: Chronic. Sleep study in 2018 showing mild RASHIDA with recommendation to start CPAP. IT does not appear that there was further follow up and not using CPAP.  Plan:  -Monitor sleeping patterns  -Monitor respiratory status  -PCP to consider sleep medicine referral  -CMP, CBC with diff and CRP due weekly on Mondays starting 4/28/25 until 5/12/25. Fax results to [phone] AUSTYN Del Rio     CKD3b w/ moderate proteinuria, at baseline  HTN  CVD/HLD  paroxysmal atrial fibrillatin  Comment: Chronic. Baseline Cr. 1.5. History of dialysis at time of liver transplant 23 years ago. Recurrent AKIs and immunosuppressive medication toxicity. Not on SGLT2i 2/2 recurrent UTI. Chart review w/ unclear heart failure history, unconfirmed with EF 60-65% on 3/10/25. BNP 1446 elevated from 1 month ago. Given unclear HF history, trace bilateral LE edema, with some pulm edema noted on imaging, consideration for volume overload. However, without orthopnea or clear cough. Will defer diuresis.  Plan:  -Continue PTA amlodipine 2.5mg every day. HOLD if SBP<100  -Change Meclizine 25mg TID PRN  -Continue PTA hydralazine 50mg TID. Hold for SBP <120mmHg if having diarrhea, otherwise hold if SBP <110mmHg.   -Continue PTA metoprolol succinate 25mg every day. HOLD if SBP<100 and/or HR<55  -Monitor BP and HR  -HOLD PTA evolocumab q2week. Follow up with PCP post TCU to resume  -Continue PTA eztimibe 10mg every day  -Continue PTA apixaban 5 mg BID  -CMP, CBC with diff and CRP due weekly on Mondays starting 4/28/25 until 5/12/25. Fax results to [phone] AUSTYN Del Rio     T2DM  (A1c 6.8% 4/14/25)  Comment: Chronic. Last A1c 6.8% on 4/14/25.   Plan:  -Continue PTA linagliptin 5mg every day  -Discontinue PTA metformin  500mg qPM (declined, not actually taking this at home)  -Continue PTA gabapentin 300mg at bedtime  -Monitor Blood Glucose QID as directed. Update NP if Blood Glucose <70 and/or >450--using freestyle phil 2 device  -Continue insulin sliding scale TID with meals and HS  -CMP, CBC with diff and CRP due weekly on Mondays starting 4/28/25 until 5/12/25. 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 Wound does not look infected.  Plan:  -Monitor for worsening s/symptoms of concerns  -Was scheduled for dermatology follow up appointment on 4/22, will need to be rescheduled. Discussed with Derm resident on call 4/21 who saw patient at bedside and discussed with her MOHS surgeon.      anemia, chronic  Comment: Chronic. Hgb 9.9 on admit. Stable.  Plan:  -Monitor bleeding risks  -Continue PTA ferrous sulfate daily  -Continue folic acid daily  -CMP, CBC with diff and CRP due weekly on Mondays starting 4/28/25 until 5/12/25. Fax results to [phone] AUSTYN Del Rio     Hypothyroidism  Papillary thyroid carcinoma  Chronic. Recent TSH~1.72 on 4/4/25  Plan;  -Continue PTA levothyroxine 175mcg qD   -Monitor for worsening s/symptoms of concerns  -CMP, CBC with diff and CRP due weekly on Mondays starting 4/28/25 until 5/12/25. Fax results to [phone] AUSTYN Del Rio     Chronic Shoulder Pain  Osteoarthritis   Lower back pain  Comment: Chronic.   Plan:  -Monitor pain complaints  -Continue Norco 5-325mg 1 tab every 6 hours PRN  -Continue diclofenac gel application to painful areas QID  -Continue Tylenol 1000mg BID   -Continue gabapentin 300mg at HS  -CMP, CBC with diff and CRP due weekly on Mondays starting 4/28/25 until 5/12/25. Fax results to [phone] AUSTYN Del Rio     Constipation   Comment: Chronic. S/T polypharmacy  Plan:  -Monitor BM  patterns  -Continue beneFiber qd    -Discontinue imodium  -Zofran PRN  -CMP, CBC with diff and CRP due weekly on Mondays starting 4/28/25 until 5/12/25. Fax results to [phone] ATTN Sindhu Del Rio    *** minutes spent on the date of the encounter doing chart review, history and exam, PMH, documentation and further activities as noted above. Collaboration with nursing staff on site, clinical managers, and therapies were completed on site today.     Electronically signed by:  Dr. Manda Flor DNP, APRN, FNP-C, WCS-C, EDS-C    Provider reviewed records from facility, and interpreted most recent imaging/lab work, and vital signs.   Acute and chronic conditions managed by writer. Have been reviewed during today's exam.

## 2025-04-25 ENCOUNTER — TRANSITIONAL CARE UNIT VISIT (OUTPATIENT)
Dept: GERIATRICS | Facility: CLINIC | Age: 75
End: 2025-04-25
Payer: MEDICARE

## 2025-04-25 DIAGNOSIS — B99.9 INTRA-ABDOMINAL INFECTION: ICD-10-CM

## 2025-04-25 DIAGNOSIS — C73 PAPILLARY THYROID CARCINOMA (H): ICD-10-CM

## 2025-04-25 DIAGNOSIS — K59.01 SLOW TRANSIT CONSTIPATION: ICD-10-CM

## 2025-04-25 DIAGNOSIS — M81.0 AGE-RELATED OSTEOPOROSIS WITHOUT CURRENT PATHOLOGICAL FRACTURE: ICD-10-CM

## 2025-04-25 DIAGNOSIS — E78.5 HYPERLIPIDEMIA LDL GOAL <70: ICD-10-CM

## 2025-04-25 DIAGNOSIS — E03.9 HYPOTHYROIDISM, UNSPECIFIED TYPE: ICD-10-CM

## 2025-04-25 DIAGNOSIS — C44.329 SQUAMOUS CELL CANCER OF SKIN OF LEFT CHEEK: ICD-10-CM

## 2025-04-25 DIAGNOSIS — G47.33 OSA (OBSTRUCTIVE SLEEP APNEA): ICD-10-CM

## 2025-04-25 DIAGNOSIS — I48.0 PAF (PAROXYSMAL ATRIAL FIBRILLATION) (H): ICD-10-CM

## 2025-04-25 DIAGNOSIS — M54.50 BILATERAL LOW BACK PAIN WITHOUT SCIATICA, UNSPECIFIED CHRONICITY: ICD-10-CM

## 2025-04-25 DIAGNOSIS — Z94.4 STATUS POST LIVER TRANSPLANTATION (H): ICD-10-CM

## 2025-04-25 DIAGNOSIS — R79.89 ABNORMAL LIVER FUNCTION TESTS: ICD-10-CM

## 2025-04-25 DIAGNOSIS — R53.81 PHYSICAL DECONDITIONING: Primary | ICD-10-CM

## 2025-04-25 DIAGNOSIS — N18.32 TYPE 2 DIABETES MELLITUS WITH STAGE 3B CHRONIC KIDNEY DISEASE, WITHOUT LONG-TERM CURRENT USE OF INSULIN (H): ICD-10-CM

## 2025-04-25 DIAGNOSIS — D64.9 CHRONIC ANEMIA: ICD-10-CM

## 2025-04-25 DIAGNOSIS — I10 HYPERTENSION GOAL BP (BLOOD PRESSURE) < 140/80: ICD-10-CM

## 2025-04-25 DIAGNOSIS — Z87.898 HX OF BACTEREMIA: ICD-10-CM

## 2025-04-25 DIAGNOSIS — G89.29 CHRONIC SHOULDER PAIN, UNSPECIFIED LATERALITY: ICD-10-CM

## 2025-04-25 DIAGNOSIS — G47.34 NOCTURNAL HYPOXEMIA: ICD-10-CM

## 2025-04-25 DIAGNOSIS — E11.22 TYPE 2 DIABETES MELLITUS WITH STAGE 3B CHRONIC KIDNEY DISEASE, WITHOUT LONG-TERM CURRENT USE OF INSULIN (H): ICD-10-CM

## 2025-04-25 DIAGNOSIS — F32.A DEPRESSION, UNSPECIFIED DEPRESSION TYPE: ICD-10-CM

## 2025-04-25 DIAGNOSIS — D84.9 IMMUNOCOMPROMISED: ICD-10-CM

## 2025-04-25 DIAGNOSIS — Z98.890 S/P MOHS SURGERY FOR BASAL CELL CARCINOMA: ICD-10-CM

## 2025-04-25 DIAGNOSIS — Z85.828 S/P MOHS SURGERY FOR BASAL CELL CARCINOMA: ICD-10-CM

## 2025-04-25 DIAGNOSIS — M62.81 GENERALIZED MUSCLE WEAKNESS: ICD-10-CM

## 2025-04-25 DIAGNOSIS — M25.519 CHRONIC SHOULDER PAIN, UNSPECIFIED LATERALITY: ICD-10-CM

## 2025-04-25 DIAGNOSIS — N18.32 STAGE 3B CHRONIC KIDNEY DISEASE (H): ICD-10-CM

## 2025-04-25 DIAGNOSIS — I50.32 CHRONIC DIASTOLIC HEART FAILURE (H): ICD-10-CM

## 2025-04-25 DIAGNOSIS — Z86.19 HX OF SEPSIS: ICD-10-CM

## 2025-04-25 NOTE — LETTER
4/25/2025      Luz Thompson  [address]  [facility] [address]        [facility] GERIATR[facility]    PRIMARY CARE PROVIDER AND CLINIC:  Daniel Hidalgo MD, [address] / [facility] [id number]***  No chief complaint on file.     [facility] Record Number:  [id number]  Place of Service where encounter took place:  No question data found.    Luz Thompson  is a 75 year old  (1950), admitted to the above facility from  [facility]. Hospital stay 4/14/25 through 4/23/25..   HPI:    ***    CODE STATUS/ADVANCE DIRECTIVES DISCUSSION:  Full Code  CPR/Full code   ALLERGIES:   Allergies   Allergen Reactions    Fluconazole Hives and Itching     Full body hives  **Intradermal skin testing negative**  [See intradermal skin testing results from 8/2/2019]    Mycophenolate Diarrhea and Nausea and Vomiting     Patient stated it was chronic and lasted months      Penicillins Anaphylaxis, Hives, Itching and Rash     **Intradermal skin testing negative**  [See intradermal skin testing results from 8/2/2019]      Simvastatin Muscle Pain (Myalgia)     severe  Other reaction(s): Myalgia caused by statin    Methotrexate Other (See Comments)     Other reaction(s): Sore  Sores in mouth, esophagus, and stomach.       Morphine And Codeine Itching and Other (See Comments)     Psych disturbance  Other reaction(s): Confusion, Mood alteration    Quinolones Anxiety, Dizziness, Headache, Other (See Comments), Palpitations and Unknown     Other reaction(s): Hyperactive behavior, Lightheadedness, Mood alteration    Dizzy, light headed    Dizziness, shaky, and jumpy    Capsules, Empty Gelatin [Gelatin]     Carvedilol Diarrhea     Per pt - severe diarrhea and LE swelling  + tolerating Toprol    Lansoprazole Diarrhea    Strawberry Extract     Azithromycin Itching     [See intradermal skin testing results from 8/2/2019]    Bactrim [Sulfamethoxazole-Trimethoprim] Other (See  "Comments)     Numb mouth, tingling lips (treated with anti-histamines)    Cephalosporins Itching     [See intradermal skin testing results from 8/2/2019]    Ciprofloxacin Hcl Other (See Comments) and Dizziness     Insomnia, mood lability, Irregular heart beat         Doxycycline Itching and Unknown     [See intradermal skin testing results from 8/2/2019]    Lisinopril Cough    Omeprazole Itching    Tolectin [Nsaids] Rash    Tolmetin Rash and Itching    Tramadol Rash, Hives and Itching      PAST MEDICAL HISTORY:   Past Medical History:   Diagnosis Date    Anemia of chronic disease 10/17/2011    Anxiety     CKD (chronic kidney disease) stage 3, GFR 30-59 ml/min (H) 04/04/2012    Coccidioidomycosis, 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  FAMILY HISTORY: family history includes Allergies in her son; Alzheimer Disease in her paternal grandfather; Breast Cancer in an other family member; Cancer - colorectal in her maternal grandmother; Cerebrovascular Disease in her paternal grandmother; Endometrial Cancer in her mother; Glaucoma in her maternal grandfather; Heart Disease in her maternal grandfather and paternal grandfather; Hyperlipidemia in her mother; Hypertension in her mother and paternal grandmother; Macular Degeneration in her father; Neurologic Disorder in her daughter; Prostate Cancer in her father.  SOCIAL HISTORY:   reports that she quit smoking about 40 years ago. Her smoking use included cigarettes. She started smoking about 58 years ago. She has a 18 pack-year smoking history. She has never used smokeless tobacco. She reports current alcohol use. She reports that she does not use drugs.  Patient's living condition: lives alone    Post Discharge Medication Reconciliation Status:   MED REC REQUIRED{TIP  Click the link below to document or use med rec list, use list to pull in response :[id number]}  Post Medication Reconciliation Status: {MED REC LIST:252942}       Current Outpatient Medications   Medication Sig Dispense Refill    acetaminophen (TYLENOL) 500 MG tablet Take 1,000 mg by mouth 2 times daily. During 4/16/25 med recc pt states take BID everyday      amLODIPine (NORVASC) 2.5 MG tablet Take 1 tablet (2.5 mg) by mouth daily. 90 tablet 3    apixaban ANTICOAGULANT (ELIQUIS) 5 MG tablet Take 1  tablet (5 mg) by mouth 2 times daily. 180 tablet 1    calcitRIOL (ROCALTROL) 0.25 MCG capsule Take 1 capsule (0.25 mcg) by mouth daily. 90 capsule 1    Continuous Glucose Sensor (FREESTYLE NINO 2 SENSOR) MISC Change every 14 days. 2 each 5    D-MANNOSE PO Take 1 Scoop by mouth 2 times daily.      dextrose 50 % injection Inject 25-50 mLs over 1-5 minutes into the vein every 15 minutes as needed for low blood sugar.      diclofenac (VOLTAREN) 1 % topical gel Apply 4 g topically 3 times daily as needed for moderate pain.      diclofenac (VOLTAREN) 1 % topical gel Apply 4 g topically 4 times daily.      diphenhydrAMINE (BENADRYL) 25 MG tablet Take 25 mg by mouth every 6 hours as needed for itching or allergies.      EPINEPHrine (ANY BX GENERIC EQUIV) 0.3 MG/0.3ML injection 2-pack Inject 0.3 mLs (0.3 mg) into the muscle as needed for anaphylaxis. May repeat one time in 5-15 minutes if response to initial dose is inadequate. 2 each 1    ERTAPENEM SODIUM IV Inject 1 g into the vein daily.      estradiol (ESTRACE) 0.1 MG/GM vaginal cream Place vaginally every other day.      evolocumab (REPATHA SURECLICK) 140 MG/ML prefilled autoinjector Inject 1 mL (140 mg) subcutaneously every 14 days. . Please have fasting labs drawn for further refills. 6 mL 3    ezetimibe (ZETIA) 10 MG tablet Take 1 tablet (10 mg) by mouth daily. 90 tablet 3    Ferrous Sulfate 324 MG TBEC Take 1 tablet by mouth daily.      folic acid (FOLVITE) 1 MG tablet TAKE 1 TABLET BY MOUTH DAILY 90 tablet 3    gabapentin (NEURONTIN) 250 MG/5ML solution Take 6 mLs (300 mg) by mouth at bedtime 180 mL 0    Glucagon (GVOKE HYPOPEN) 1 MG/0.2ML pen Inject the contents of 1 device under the skin into lower abdomen, outer thigh, or outer upper arm as needed for hypoglycemia. If no response after 15 minutes, additional 1 mg dose from a new device may be injected while waiting for emergency assistance.      glucose (BD GLUCOSE) 5 g chewable tablet Take 2 tablets (10 g) by  mouth as needed (low blood sugar) 40 tablet 1    glucose 40 % (400 mg/mL) gel Take 15-30 g by mouth every 15 minutes as needed for low blood sugar.      hydrALAZINE (APRESOLINE) 50 MG tablet Take 1 tablet (50 mg) by mouth 3 times daily. Hold for SBP <120mmHg if having diarrhea, otherwise hold if SBP <110mmHg. 270 tablet 3    insulin aspart (NOVOLOG PEN) 100 UNIT/ML pen Inject 1-7 Units subcutaneously 3 times daily (before meals).      insulin aspart (NOVOLOG PEN) 100 UNIT/ML pen Inject 1-5 Units subcutaneously at bedtime.      ketoconazole (NIZORAL) 2 % external shampoo Apply topically three times a week. Lather in the shower, wait 3-5 minutes before rinsing. 100 mL 11    levothyroxine (SYNTHROID/LEVOTHROID) 175 MCG tablet Take 1 tablet (175 mcg) by mouth daily. 90 tablet 1    Lidocaine (LIDOCARE) 4 % Patch Place 1 patch over 12 hours onto the skin daily as needed for moderate pain. To prevent lidocaine toxicity, patient should be patch free for 12 hrs daily.      Lidocaine (LIDOCARE) 4 % Patch Place 2 patches over 12 hours onto the skin daily as needed. To prevent lidocaine toxicity, patient should be patch free for 12 hrs daily.      lidocaine (LMX4) 4 % external cream Apply topically every hour as needed for pain (with VAD insertion).      lidocaine 1 % SOLN 0.1-1 mLs by Other route every hour as needed (mild pain with VAD insertion).      linagliptin (TRADJENTA) 5 MG TABS tablet Take 1 tablet (5 mg) by mouth daily. 90 tablet 1    loperamide (IMODIUM) 2 MG capsule Take 1 capsule (2 mg) by mouth 4 times daily as needed for diarrhea.      meclizine (ANTIVERT) 25 MG tablet Take 1 tablet (25 mg) by mouth as needed for dizziness or other (migraines) 30 tablet 11    melatonin 1 MG TABS tablet Take 1 tablet (1 mg) by mouth nightly as needed for sleep.      metoprolol succinate ER (TOPROL XL) 25 MG 24 hr tablet Take 1 tablet (25 mg) by mouth daily.      Multiple Vitamins-Minerals (PRESERVISION AREDS 2) CAPS Take 1  "capsule by mouth 2 times daily      NONFORMULARY Apply 1 Application topically daily as needed. Momma Bear Nerve Lotion - pt uses on feet      Nutrisource Fiber PO packet Take 1 packet by mouth daily. Benefiber      omega-3 acid ethyl esters (LOVAZA) 1 g capsule Take 1 capsule (1 g) by mouth 2 times daily.      omega-3 acid ethyl esters (LOVAZA) 1 g capsule Take 1 capsule by mouth 2 times daily. 180 capsule 1    ondansetron (ZOFRAN ODT) 4 MG ODT tab Take 1 tablet (4 mg) by mouth every 6 hours as needed.      predniSONE (DELTASONE) 10 MG tablet Take 1 tablet (10 mg) by mouth daily. 90 tablet 3    sertraline (ZOLOFT) 25 MG tablet Take 1 tablet (25 mg) by mouth daily for 7 days, THEN 2 tablets (50 mg) daily.      sirolimus (GENERIC EQUIVALENT) 1 MG tablet Take 1 tablet (1 mg) by mouth daily. 90 tablet 3    triamcinolone (KENALOG) 0.1 % external ointment Apply topically 2 times daily. Use 2 weeks or less. 80 g 0    ursodiol (ACTIGALL) 250 MG tablet Take 1 tablet (250 mg) by mouth 2 times daily. 180 tablet 3     No current facility-administered medications for this visit.       ROS:  10 point ROS of systems including Constitutional, Eyes, Respiratory, Cardiovascular, Gastroenterology, Genitourinary, Integumentary, Musculoskeletal, Psychiatric were all negative except for pertinent positives noted in my HPI.    Vitals:  LMP 06/01/1988 (Approximate)   Exam:  GENERAL APPEARANCE:  {[facility] GENERAL APPEARANCE:204384}  ENT:  {[facility] ENT PE:316126::\"Mouth and posterior oropharynx normal, moist mucous membranes\"}  EYES:  {[facility] EYES PE :555599::\"EOM, conjunctivae, lids, pupils and irises normal\"}  RESP:  {[facility] RESP PE:632338}  CV:  {[facility] CV PE:749323}  ABDOMEN:  {[facility] GI PE:043726::\"normal bowel sounds, soft, nontender, no hepatosplenomegaly or other masses\"}  M/S:   {[facility] M/S PE:681020}  SKIN:  {[facility] PE:933559}  NEURO:   {[facility] NEURO PE:797163}  PSYCH:  " {[facility] PSYCH PE:720101}    Lab/Diagnostic data:  {fgslab:370463}    ASSESSMENT/PLAN:    {S DX2:422010}    Orders:  {fgsorders:375057}  ***    Electronically signed by:  Annmarie Cobos MA ***                   Sincerely,        LIZ Obando CNP    Electronically signed

## 2025-04-26 LAB
GAMMA INTERFERON BACKGROUND BLD IA-ACNC: 0.07 IU/ML
M TB IFN-G BLD-IMP: NEGATIVE
M TB IFN-G CD4+ BCKGRND COR BLD-ACNC: 9.93 IU/ML
MITOGEN IGNF BCKGRD COR BLD-ACNC: 0.01 IU/ML
MITOGEN IGNF BCKGRD COR BLD-ACNC: 0.02 IU/ML
QUANTIFERON MITOGEN: 10 IU/ML
QUANTIFERON NIL TUBE: 0.07 IU/ML
QUANTIFERON TB1 TUBE: 0.08 IU/ML
QUANTIFERON TB2 TUBE: 0.09

## 2025-04-27 ENCOUNTER — LAB REQUISITION (OUTPATIENT)
Dept: LAB | Facility: CLINIC | Age: 75
End: 2025-04-27
Payer: MEDICARE

## 2025-04-27 DIAGNOSIS — N18.30 CHRONIC KIDNEY DISEASE, STAGE 3 UNSPECIFIED (H): ICD-10-CM

## 2025-04-27 DIAGNOSIS — K65.1 PERITONEAL ABSCESS (H): ICD-10-CM

## 2025-04-27 DIAGNOSIS — I10 ESSENTIAL (PRIMARY) HYPERTENSION: ICD-10-CM

## 2025-04-27 DIAGNOSIS — D64.9 ANEMIA, UNSPECIFIED: ICD-10-CM

## 2025-04-27 NOTE — PROGRESS NOTES
[facility] GERIATRICS    Chief Complaint   Patient presents with    RECHECK     HPI:  Luz Thompson is a 75 year old  (1950), who is being seen today for an episodic care visit at: [facility] ([facility])([facility]) [[id number]]. Today's concern is: The primary encounter diagnosis was Physical deconditioning. 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, S/P Mohs surgery for basal cell carcinoma, PAF (paroxysmal atrial fibrillation) (H), Hyperlipidemia LDL goal <70, Chronic diastolic heart failure (H), Hypertension goal BP (blood pressure) < 140/80, Stage 3b chronic kidney disease (H), Type 2 diabetes mellitus with stage 3b chronic kidney disease, without long-term current use of insulin (H), RASHIDA (obstructive sleep apnea), Nocturnal hypoxemia, Abnormal liver function tests, Depression, unspecified depression type, Status post liver transplantation (H), Intra-abdominal infection, Immunocompromised, Hx of sepsis, Hx of bacteremia, and Papillary thyroid carcinoma (H) were also pertinent to this visit.    Met with patient who was found lying in bed consuming breakfast. She denies any chest pain, palpitations, shortness of breath, [place], lightheadedness, dizziness, or cough. Denies any abdominal discomfort. Denies N&V. Denies dysuria or frequency. Reports bout of diarrhea episodes this weekend but resolved without concerns. Appetite good. Sleeping well. Per chart review she reports during her recent hospitalization discussion with hepatology to start prednisone taper down to 9mg daily. I see no records of this upon my attempt to review chart, however patient is wanting to start this with ongoing follow  "up with transplant team as directed. New orders given on site today. She has follow up with ID later this afternoon with hopes of being able to stop antibiotic course sooner than 5/8. Pending ID discussion.     BP Readings from Last 3 Encounters:   04/28/25 130/62   04/28/25 127/60   04/24/25 128/61     Wt Readings from Last 5 Encounters:   04/28/25 82.1 kg (181 lb)   04/28/25 82.1 kg (181 lb)   04/24/25 82.1 kg (181 lb)   04/15/25 76.4 kg (168 lb 6.4 oz)   02/12/25 77.1 kg (170 lb)     Allergies, and PMH/PSH reviewed in EPIC today.  REVIEW OF SYSTEMS:  4 point ROS including Respiratory, CV, GI and , other than that noted in the HPI,  is negative    Objective:   /60   Pulse 66   Temp 97.9  F (36.6  C)   Resp 18   Ht 1.702 m (5' 7\")   Wt 82.1 kg (181 lb)   LMP 06/01/1988 (Approximate)   SpO2 96%   BMI 28.35 kg/m    GENERAL APPEARANCE:  Alert, in no distress, oriented, cooperative  ENT:  Mouth and posterior oropharynx normal, moist mucous membranes, [place]  EYES:  EOM, conjunctivae, lids, pupils and irises normal  NECK:  No adenopathy,masses or thyromegaly  RESP:  respiratory effort and palpation of chest normal, lungs clear to auscultation , no respiratory distress  CV:  regular rate and rhythm, no murmur, rub, or gallop, peripheral edema 1+ in BLE  ABDOMEN:  normal bowel sounds, soft, nontender, no hepatosplenomegaly or other masses, no guarding or rebound  M/S:   Ambulates with walker. Staff to assist for ADLs, transfers and cares  SKIN:  Inspection of skin and subcutaneous tissue baseline, Palpation of skin and subcutaneous tissue baseline  NEURO:   Cranial nerves 2-12 are normal tested and grossly at patient's baseline, no purposeful movement in upper and lower extremities  PSYCH:  oriented X 3, affect and mood normal    Most Recent 3 CBC's:  Recent Labs   Lab Test 04/28/25  1213 04/20/25  0637 04/19/25  0714   WBC 14.2* 8.3 6.0   HGB 9.9* 8.6* 8.7*   MCV 88 87 92   * 401 383     Most Recent 3 " BMP's:  Recent Labs   Lab Test 04/23/25  0754 04/23/25  0156 04/22/25  2124 04/20/25  0950 04/20/25  0637 04/19/25  0834 04/19/25  0714 04/18/25  0852 04/18/25  0618   NA  --   --   --   --  143  --  144  --  141   POTASSIUM  --   --   --   --  4.2  --  4.4  --  4.2   CHLORIDE  --   --   --   --  110*  --  111*  --  109*   CO2  --   --   --   --  23  --  24  --  22   BUN  --   --   --   --  35.6*  --  33.5*  --  35.3*   CR  --   --   --   --  1.33*  --  1.32*  --  1.40*   ANIONGAP  --   --   --   --  10  --  9  --  10   KEILY  --   --   --   --  8.8  --  8.8  --  8.7*   * 156* 236*   < > 113*   < > 116*   < > 122*    < > = values in this interval not displayed.     Most Recent 2 LFT's:  Recent Labs   Lab Test 04/20/25  0637 04/19/25  0714   AST 27 59*   ALT 50 75*   ALKPHOS 97 104   BILITOTAL <0.2 <0.2     Most Recent ESR & CRP:  Recent Labs   Lab Test 04/28/25  1213 04/15/25  0613 06/13/23  1820 08/26/19  2331   SED  --  61*  --  78*   CRP  --   --   --  180.0*   CRPI <3.00  --    < >  --     < > = values in this interval not displayed.     Most Recent Anemia Panel:  Recent Labs   Lab Test 04/28/25  1213 04/14/25  1410 04/07/25  0945 09/04/24  1008 06/15/23  1028   WBC 14.2*   < > 6.5   < > 7.6   HGB 9.9*   < > 9.1*   < > 12.1   HCT 32.7*   < > 30.0*   < > 38.4   MCV 88   < > 88   < > 92   *   < > 351   < > 345   IRON  --   --  36*   < >  --    IRONSAT  --   --  13*   < >  --    FEB  --   --  277   < >  --    LAURA  --   --  44   < >  --    B12  --   --   --   --  456    < > = values in this interval not displayed.       ASSESSMENT/PLAN:  sepsis 2/2 intraabdominal infection - resolved  Recurrent UTIs on IV ertapenem since 3/9/2024  Recent Hx of E. Faecalis bacteremia  Sigmoid microperforation  Comment: Admitted for generalized weakness likely 2/2 infection. Found to have intraabdominal infection secondary to a sigmoid microperforation. Recent admission in March 2025 for Klebsiella pneumonia UTI and E.  Faecalis bacteremia with recommendations from ID for ertapenem 1g q24h until EOT 4/22. RVP negative.  Plan:  -Follow up with ID as directed. Scheduled for Monday 4/28/25  -Continue vaginal estradiol MWF  -Monitor for worsening s/symptoms of concerns  -Continue IV ertapenem 1gm daily as directed. Due for 2-4 weeks to be determined by ID. Estimated until 5/8/25.   -CMP, CBC with diff and CRP due weekly on Mondays starting 4/28/25 until 5/12/25. Fax results to [phone] [facility] [place]     Liver transplant in 2002 2/2 primary biliary cirrhosis  Comment: Chronic. Follows Dr. Ramirez.   Plan:  -Continue PTA sirolimus 1 mg every day  -Reduce prednisone down to 9mg daily for now. (5mg with 4 tablets of 1mg=9mg total)  -Continue PTA ursodiol 250 mg BID  -Follow up with transplant team as directed  -CMP, CBC with diff and CRP due weekly on Mondays starting 4/28/25 until 5/12/25. Fax results to [phone] [facility] [place]     Depression   Comment: Chronic. Reports feeling more down and depressed with little interest in doing things. Has a therapist in outpatient setting and has been on anti-depressants historically.   Plan:  -Monitor mood and behaviors  -Monitor for worsening s/symptoms of concerns  -Monitor for changes in mobility, eating and sleeping patterns  -Continue Zoloft 25 mg x 7 days, 50 mg every day there after  -Follow up with psych post TCU  -CMP, CBC with diff and CRP due weekly on Mondays starting 4/28/25 until 5/12/25. Fax results to [phone] [facility] [place]     Mild Obstructive Sleep Apnea   Nocturnal Hypoxemia   Comment: Chronic. Sleep study in 2018 showing mild RASHIDA with recommendation to start CPAP. IT does not appear that there was further follow up and not using CPAP.  Plan:  -Monitor sleeping patterns  -Monitor respiratory status  -PCP to consider sleep medicine referral  -CMP, CBC with diff and CRP due weekly on Mondays starting 4/28/25 until 5/12/25. Fax results to ID  [phone] [facility] [place]     CKD3b w/ moderate proteinuria, at baseline  HTN  CVD/HLD  paroxysmal atrial fibrillatin  Comment: Chronic. Baseline Cr. 1.5. History of dialysis at time of liver transplant 23 years ago. Recurrent AKIs and immunosuppressive medication toxicity. Not on SGLT2i 2/2 recurrent UTI. Chart review w/ unclear heart failure history, unconfirmed with EF 60-65% on 3/10/25. BNP 1446 elevated from 1 month ago. Given unclear HF history, trace bilateral LE edema, with some pulm edema noted on imaging, consideration for volume overload. However, without orthopnea or clear cough. Will defer diuresis.  Plan:  -Continue PTA amlodipine 2.5mg every day. HOLD if SBP<100  -Continue Meclizine 25mg TID PRN  -Continue PTA hydralazine 50mg TID. Change hold parameters to the following per preference. Hold for SBP <100mmHg.   -Continue PTA metoprolol succinate 25mg every day. HOLD if SBP<100 and/or HR<55  -Monitor BP and HR  -HOLD PTA evolocumab q2week. Follow up with PCP post TCU to resume  -Continue PTA eztimibe 10mg every day  -Continue PTA apixaban 5 mg BID  -CMP, CBC with diff and CRP due weekly on Mondays starting 4/28/25 until 5/12/25. Fax results to [phone] [facility] [place]         T2DM  (A1c 6.8% 4/14/25)  Comment: Chronic. Last A1c 6.8% on 4/14/25.   Plan:  -Continue PTA linagliptin 5mg every day  -Continue PTA gabapentin 300mg at bedtime  -Blood Glucose monitoring back to previous schedule of BID. HOLD off on using freestyle phil 2 sensor while on TCU  -CMP, CBC with diff and CRP due weekly on Mondays starting 4/28/25 until 5/12/25. 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 Wound does not look infected.  Plan:  -Monitor for worsening s/symptoms of concerns  -Was scheduled for dermatology follow up appointment on 4/22, will need to be  rescheduled. Discussed with Derm resident on call 4/21 who saw patient at bedside and discussed with her MOHS surgeon.      anemia, chronic  Comment: Chronic. Hgb 9.9 on admit. Stable.  Plan:  -Monitor bleeding risks  -Continue PTA ferrous sulfate daily  -Continue folic acid daily  -CMP, CBC with diff and CRP due weekly on Mondays starting 4/28/25 until 5/12/25. Fax results to [phone] [facility] [place]     Hypothyroidism  Papillary thyroid carcinoma  Chronic. Recent TSH~1.72 on 4/4/25  Plan;  -Continue PTA levothyroxine 175mcg qD   -Monitor for worsening s/symptoms of concerns  -CMP, CBC with diff and CRP due weekly on Mondays starting 4/28/25 until 5/12/25. Fax results to [phone] [facility] [place]     Chronic Shoulder Pain  Osteoarthritis   Lower back pain  Comment: Chronic. stable  Plan:  -Monitor pain complaints  -Continue diclofenac gel application to painful areas QID  -Continue Tylenol 1000mg BID   -Continue gabapentin 300mg at HS--only able to tolerate liquid given gelatin capsule allergy  -CMP, CBC with diff and CRP due weekly on Mondays starting 4/28/25 until 5/12/25. Fax results to [phone] [facility] [place]     Constipation   Comment: Chronic. S/T polypharmacy  Plan:  -Monitor BM patterns  -Continue beneFiber qd    -Zofran PRN  -CMP, CBC with diff and CRP due weekly on Mondays starting 4/28/25 until 5/12/25. Fax results to [phone] [facility] [place]     Electronically signed by:  Dr. Manda Flor DNP, APRN, FNP-C, WCS-C, EDS-C     Provider reviewed records from facility, and interpreted most recent imaging/lab work, and vital signs.   Acute and chronic conditions managed by writer. Have been reviewed during today's exam.

## 2025-04-28 ENCOUNTER — OFFICE VISIT (OUTPATIENT)
Dept: INFECTIOUS DISEASES | Facility: CLINIC | Age: 75
End: 2025-04-28
Attending: STUDENT IN AN ORGANIZED HEALTH CARE EDUCATION/TRAINING PROGRAM
Payer: MEDICARE

## 2025-04-28 ENCOUNTER — TRANSITIONAL CARE UNIT VISIT (OUTPATIENT)
Dept: GERIATRICS | Facility: CLINIC | Age: 75
End: 2025-04-28
Payer: MEDICARE

## 2025-04-28 VITALS
RESPIRATION RATE: 18 BRPM | TEMPERATURE: 97.9 F | SYSTOLIC BLOOD PRESSURE: 127 MMHG | HEART RATE: 66 BPM | WEIGHT: 181 LBS | OXYGEN SATURATION: 96 % | BODY MASS INDEX: 28.41 KG/M2 | HEIGHT: 67 IN | DIASTOLIC BLOOD PRESSURE: 60 MMHG

## 2025-04-28 VITALS
BODY MASS INDEX: 28.41 KG/M2 | HEIGHT: 67 IN | SYSTOLIC BLOOD PRESSURE: 130 MMHG | WEIGHT: 181 LBS | OXYGEN SATURATION: 94 % | HEART RATE: 78 BPM | DIASTOLIC BLOOD PRESSURE: 62 MMHG

## 2025-04-28 DIAGNOSIS — D84.9 IMMUNOCOMPROMISED: ICD-10-CM

## 2025-04-28 DIAGNOSIS — R79.89 ABNORMAL LIVER FUNCTION TESTS: ICD-10-CM

## 2025-04-28 DIAGNOSIS — K57.20 DIVERTICULITIS OF LARGE INTESTINE WITH PERFORATION AND ABSCESS WITHOUT BLEEDING: Primary | ICD-10-CM

## 2025-04-28 DIAGNOSIS — E78.5 HYPERLIPIDEMIA LDL GOAL <70: ICD-10-CM

## 2025-04-28 DIAGNOSIS — R32 INCONTINENCE IN FEMALE: ICD-10-CM

## 2025-04-28 DIAGNOSIS — G89.29 CHRONIC SHOULDER PAIN, UNSPECIFIED LATERALITY: ICD-10-CM

## 2025-04-28 DIAGNOSIS — Z85.828 S/P MOHS SURGERY FOR BASAL CELL CARCINOMA: ICD-10-CM

## 2025-04-28 DIAGNOSIS — M62.81 GENERALIZED MUSCLE WEAKNESS: ICD-10-CM

## 2025-04-28 DIAGNOSIS — M54.50 BILATERAL LOW BACK PAIN WITHOUT SCIATICA, UNSPECIFIED CHRONICITY: ICD-10-CM

## 2025-04-28 DIAGNOSIS — M25.519 CHRONIC SHOULDER PAIN, UNSPECIFIED LATERALITY: ICD-10-CM

## 2025-04-28 DIAGNOSIS — K59.01 SLOW TRANSIT CONSTIPATION: ICD-10-CM

## 2025-04-28 DIAGNOSIS — I50.32 CHRONIC DIASTOLIC HEART FAILURE (H): ICD-10-CM

## 2025-04-28 DIAGNOSIS — F32.A DEPRESSION, UNSPECIFIED DEPRESSION TYPE: ICD-10-CM

## 2025-04-28 DIAGNOSIS — E03.9 HYPOTHYROIDISM, UNSPECIFIED TYPE: ICD-10-CM

## 2025-04-28 DIAGNOSIS — Z94.4 STATUS POST LIVER TRANSPLANTATION (H): ICD-10-CM

## 2025-04-28 DIAGNOSIS — I10 HYPERTENSION GOAL BP (BLOOD PRESSURE) < 140/80: ICD-10-CM

## 2025-04-28 DIAGNOSIS — N18.32 TYPE 2 DIABETES MELLITUS WITH STAGE 3B CHRONIC KIDNEY DISEASE, WITHOUT LONG-TERM CURRENT USE OF INSULIN (H): ICD-10-CM

## 2025-04-28 DIAGNOSIS — R53.81 PHYSICAL DECONDITIONING: Primary | ICD-10-CM

## 2025-04-28 DIAGNOSIS — C44.329 SQUAMOUS CELL CANCER OF SKIN OF LEFT CHEEK: ICD-10-CM

## 2025-04-28 DIAGNOSIS — D64.9 CHRONIC ANEMIA: ICD-10-CM

## 2025-04-28 DIAGNOSIS — M81.0 AGE-RELATED OSTEOPOROSIS WITHOUT CURRENT PATHOLOGICAL FRACTURE: ICD-10-CM

## 2025-04-28 DIAGNOSIS — G47.33 OSA (OBSTRUCTIVE SLEEP APNEA): ICD-10-CM

## 2025-04-28 DIAGNOSIS — Z94.4 H/O LIVER TRANSPLANT (H): ICD-10-CM

## 2025-04-28 DIAGNOSIS — D84.9 IMMUNOSUPPRESSED STATUS: ICD-10-CM

## 2025-04-28 DIAGNOSIS — E11.22 TYPE 2 DIABETES MELLITUS WITH STAGE 3B CHRONIC KIDNEY DISEASE, WITHOUT LONG-TERM CURRENT USE OF INSULIN (H): ICD-10-CM

## 2025-04-28 DIAGNOSIS — L98.9 SKIN LESION OF CHEEK: ICD-10-CM

## 2025-04-28 DIAGNOSIS — Z98.890 S/P MOHS SURGERY FOR BASAL CELL CARCINOMA: ICD-10-CM

## 2025-04-28 DIAGNOSIS — Z86.19 HX OF SEPSIS: ICD-10-CM

## 2025-04-28 DIAGNOSIS — C73 PAPILLARY THYROID CARCINOMA (H): ICD-10-CM

## 2025-04-28 DIAGNOSIS — B99.9 INTRA-ABDOMINAL INFECTION: ICD-10-CM

## 2025-04-28 DIAGNOSIS — Z87.898 HX OF BACTEREMIA: ICD-10-CM

## 2025-04-28 DIAGNOSIS — N39.0 RECURRENT UTI: ICD-10-CM

## 2025-04-28 DIAGNOSIS — G47.34 NOCTURNAL HYPOXEMIA: ICD-10-CM

## 2025-04-28 DIAGNOSIS — N18.32 STAGE 3B CHRONIC KIDNEY DISEASE (H): ICD-10-CM

## 2025-04-28 DIAGNOSIS — I48.0 PAF (PAROXYSMAL ATRIAL FIBRILLATION) (H): ICD-10-CM

## 2025-04-28 LAB
ALBUMIN SERPL BCG-MCNC: 3.7 G/DL (ref 3.5–5.2)
ALP SERPL-CCNC: 91 U/L (ref 40–150)
ALT SERPL W P-5'-P-CCNC: 11 U/L (ref 0–50)
ANION GAP SERPL CALCULATED.3IONS-SCNC: 16 MMOL/L (ref 7–15)
AST SERPL W P-5'-P-CCNC: 19 U/L (ref 0–45)
BASOPHILS # BLD AUTO: 0.1 10E3/UL (ref 0–0.2)
BASOPHILS NFR BLD AUTO: 1 %
BILIRUB SERPL-MCNC: 0.2 MG/DL
BUN SERPL-MCNC: 39.9 MG/DL (ref 8–23)
CALCIUM SERPL-MCNC: 9.3 MG/DL (ref 8.8–10.4)
CHLORIDE SERPL-SCNC: 103 MMOL/L (ref 98–107)
CREAT SERPL-MCNC: 1.45 MG/DL (ref 0.51–0.95)
CRP SERPL-MCNC: <3 MG/L
EGFRCR SERPLBLD CKD-EPI 2021: 37 ML/MIN/1.73M2
EOSINOPHIL # BLD AUTO: 0.1 10E3/UL (ref 0–0.7)
EOSINOPHIL NFR BLD AUTO: 1 %
ERYTHROCYTE [DISTWIDTH] IN BLOOD BY AUTOMATED COUNT: 18.3 % (ref 10–15)
GLUCOSE SERPL-MCNC: 103 MG/DL (ref 70–99)
HCO3 SERPL-SCNC: 20 MMOL/L (ref 22–29)
HCT VFR BLD AUTO: 32.7 % (ref 35–47)
HGB BLD-MCNC: 9.9 G/DL (ref 11.7–15.7)
IMM GRANULOCYTES # BLD: 0.4 10E3/UL
IMM GRANULOCYTES NFR BLD: 3 %
LYMPHOCYTES # BLD AUTO: 1.9 10E3/UL (ref 0.8–5.3)
LYMPHOCYTES NFR BLD AUTO: 13 %
MCH RBC QN AUTO: 26.8 PG (ref 26.5–33)
MCHC RBC AUTO-ENTMCNC: 30.3 G/DL (ref 31.5–36.5)
MCV RBC AUTO: 88 FL (ref 78–100)
MONOCYTES # BLD AUTO: 1.3 10E3/UL (ref 0–1.3)
MONOCYTES NFR BLD AUTO: 9 %
NEUTROPHILS # BLD AUTO: 10.4 10E3/UL (ref 1.6–8.3)
NEUTROPHILS NFR BLD AUTO: 74 %
NRBC # BLD AUTO: 0 10E3/UL
NRBC BLD AUTO-RTO: 0 /100
PLATELET # BLD AUTO: 497 10E3/UL (ref 150–450)
POTASSIUM SERPL-SCNC: 4.5 MMOL/L (ref 3.4–5.3)
PROT SERPL-MCNC: 6.5 G/DL (ref 6.4–8.3)
RBC # BLD AUTO: 3.7 10E6/UL (ref 3.8–5.2)
SODIUM SERPL-SCNC: 139 MMOL/L (ref 135–145)
WBC # BLD AUTO: 14.2 10E3/UL (ref 4–11)

## 2025-04-28 PROCEDURE — 85004 AUTOMATED DIFF WBC COUNT: CPT | Performed by: NURSE PRACTITIONER

## 2025-04-28 PROCEDURE — 36415 COLL VENOUS BLD VENIPUNCTURE: CPT | Performed by: NURSE PRACTITIONER

## 2025-04-28 PROCEDURE — 84155 ASSAY OF PROTEIN SERUM: CPT | Performed by: NURSE PRACTITIONER

## 2025-04-28 PROCEDURE — 1125F AMNT PAIN NOTED PAIN PRSNT: CPT | Performed by: STUDENT IN AN ORGANIZED HEALTH CARE EDUCATION/TRAINING PROGRAM

## 2025-04-28 PROCEDURE — 3075F SYST BP GE 130 - 139MM HG: CPT | Performed by: STUDENT IN AN ORGANIZED HEALTH CARE EDUCATION/TRAINING PROGRAM

## 2025-04-28 PROCEDURE — 99215 OFFICE O/P EST HI 40 MIN: CPT | Mod: 24 | Performed by: STUDENT IN AN ORGANIZED HEALTH CARE EDUCATION/TRAINING PROGRAM

## 2025-04-28 PROCEDURE — 3078F DIAST BP <80 MM HG: CPT | Performed by: STUDENT IN AN ORGANIZED HEALTH CARE EDUCATION/TRAINING PROGRAM

## 2025-04-28 PROCEDURE — 99309 SBSQ NF CARE MODERATE MDM 30: CPT | Performed by: NURSE PRACTITIONER

## 2025-04-28 PROCEDURE — 99417 PROLNG OP E/M EACH 15 MIN: CPT | Performed by: STUDENT IN AN ORGANIZED HEALTH CARE EDUCATION/TRAINING PROGRAM

## 2025-04-28 PROCEDURE — G0463 HOSPITAL OUTPT CLINIC VISIT: HCPCS | Performed by: STUDENT IN AN ORGANIZED HEALTH CARE EDUCATION/TRAINING PROGRAM

## 2025-04-28 PROCEDURE — 86140 C-REACTIVE PROTEIN: CPT | Performed by: NURSE PRACTITIONER

## 2025-04-28 RX ORDER — PREDNISONE 5 MG/1
5 TABLET ORAL DAILY
Qty: 30 TABLET | Refills: 0 | Status: SHIPPED | OUTPATIENT
Start: 2025-04-28

## 2025-04-28 RX ORDER — PREDNISONE 1 MG/1
4 TABLET ORAL DAILY
Qty: 90 TABLET | Refills: 0 | Status: SHIPPED | OUTPATIENT
Start: 2025-04-28

## 2025-04-28 ASSESSMENT — PAIN SCALES - GENERAL: PAINLEVEL_OUTOF10: MODERATE PAIN (4)

## 2025-04-28 NOTE — LETTER
4/28/2025      Luz Thompson  [address]  [facility] [address]        [facility]    Chief Complaint   Patient presents with     RECHECK     HPI:  Luz Thompson is a 75 year old  (1950), who is being seen today for an episodic care visit at: [facility]) [[id number]]. Today's concern is: The primary encounter diagnosis was Physical deconditioning. 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, S/P Mohs surgery for basal cell carcinoma, PAF (paroxysmal atrial fibrillation) (H), Hyperlipidemia LDL goal <70, Chronic diastolic heart failure (H), Hypertension goal BP (blood pressure) < 140/80, Stage 3b chronic kidney disease (H), Type 2 diabetes mellitus with stage 3b chronic kidney disease, without long-term current use of insulin (H), RASHIDA (obstructive sleep apnea), Nocturnal hypoxemia, Abnormal liver function tests, Depression, unspecified depression type, Status post liver transplantation (H), Intra-abdominal infection, Immunocompromised, Hx of sepsis, Hx of bacteremia, and Papillary thyroid carcinoma (H) were also pertinent to this visit.    Met with patient who was found lying in bed consuming breakfast. She denies any chest pain, palpitations, shortness of breath, [place], lightheadedness, dizziness, or cough. Denies any abdominal discomfort. Denies N&V. Denies dysuria or frequency. Reports bout of diarrhea episodes this weekend but resolved without concerns. Appetite good. Sleeping well. Per chart review she reports during her recent hospitalization discussion with hepatology to start prednisone taper down to 9mg daily. I see no records of this upon  "my attempt to review chart, however patient is wanting to start this with ongoing follow up with transplant team as directed. New orders given on site today. She has follow up with ID later this afternoon with hopes of being able to stop antibiotic course sooner than 5/8. Pending ID discussion.     BP Readings from Last 3 Encounters:   04/28/25 130/62   04/28/25 127/60   04/24/25 128/61     Wt Readings from Last 5 Encounters:   04/28/25 82.1 kg (181 lb)   04/28/25 82.1 kg (181 lb)   04/24/25 82.1 kg (181 lb)   04/15/25 76.4 kg (168 lb 6.4 oz)   02/12/25 77.1 kg (170 lb)     Allergies, and PMH/PSH reviewed in EPIC today.  REVIEW OF SYSTEMS:  4 point ROS including Respiratory, CV, GI and , other than that noted in the HPI,  is negative    Objective:   /60   Pulse 66   Temp 97.9  F (36.6  C)   Resp 18   Ht 1.702 m (5' 7\")   Wt 82.1 kg (181 lb)   LMP 06/01/1988 (Approximate)   SpO2 96%   BMI 28.35 kg/m    GENERAL APPEARANCE:  Alert, in no distress, oriented, cooperative  ENT:  Mouth and posterior oropharynx normal, moist mucous membranes, [place]  EYES:  EOM, conjunctivae, lids, pupils and irises normal  NECK:  No adenopathy,masses or thyromegaly  RESP:  respiratory effort and palpation of chest normal, lungs clear to auscultation , no respiratory distress  CV:  regular rate and rhythm, no murmur, rub, or gallop, peripheral edema 1+ in BLE  ABDOMEN:  normal bowel sounds, soft, nontender, no hepatosplenomegaly or other masses, no guarding or rebound  M/S:   Ambulates with walker. Staff to assist for ADLs, transfers and cares  SKIN:  Inspection of skin and subcutaneous tissue baseline, Palpation of skin and subcutaneous tissue baseline  NEURO:   Cranial nerves 2-12 are normal tested and grossly at patient's baseline, no purposeful movement in upper and lower extremities  PSYCH:  oriented X 3, affect and mood normal    Most Recent 3 CBC's:  Recent Labs   Lab Test 04/28/25  1213 04/20/25  0637 04/19/25  0714 "   WBC 14.2* 8.3 6.0   HGB 9.9* 8.6* 8.7*   MCV 88 87 92   * 401 383     Most Recent 3 BMP's:  Recent Labs   Lab Test 04/23/25  0754 04/23/25  0156 04/22/25  2124 04/20/25  0950 04/20/25  0637 04/19/25  0834 04/19/25  0714 04/18/25  0852 04/18/25  0618   NA  --   --   --   --  143  --  144  --  141   POTASSIUM  --   --   --   --  4.2  --  4.4  --  4.2   CHLORIDE  --   --   --   --  110*  --  111*  --  109*   CO2  --   --   --   --  23  --  24  --  22   BUN  --   --   --   --  35.6*  --  33.5*  --  35.3*   CR  --   --   --   --  1.33*  --  1.32*  --  1.40*   ANIONGAP  --   --   --   --  10  --  9  --  10   KEILY  --   --   --   --  8.8  --  8.8  --  8.7*   * 156* 236*   < > 113*   < > 116*   < > 122*    < > = values in this interval not displayed.     Most Recent 2 LFT's:  Recent Labs   Lab Test 04/20/25  0637 04/19/25  0714   AST 27 59*   ALT 50 75*   ALKPHOS 97 104   BILITOTAL <0.2 <0.2     Most Recent ESR & CRP:  Recent Labs   Lab Test 04/28/25  1213 04/15/25  0613 06/13/23  1820 08/26/19  2331   SED  --  61*  --  78*   CRP  --   --   --  180.0*   CRPI <3.00  --    < >  --     < > = values in this interval not displayed.     Most Recent Anemia Panel:  Recent Labs   Lab Test 04/28/25  1213 04/14/25  1410 04/07/25  0945 09/04/24  1008 06/15/23  1028   WBC 14.2*   < > 6.5   < > 7.6   HGB 9.9*   < > 9.1*   < > 12.1   HCT 32.7*   < > 30.0*   < > 38.4   MCV 88   < > 88   < > 92   *   < > 351   < > 345   IRON  --   --  36*   < >  --    IRONSAT  --   --  13*   < >  --    FEB  --   --  277   < >  --    LAURA  --   --  44   < >  --    B12  --   --   --   --  456    < > = values in this interval not displayed.       ASSESSMENT/PLAN:  sepsis 2/2 intraabdominal infection - resolved  Recurrent UTIs on IV ertapenem since 3/9/2024  Recent Hx of E. Faecalis bacteremia  Sigmoid microperforation  Comment: Admitted for generalized weakness likely 2/2 infection. Found to have intraabdominal infection secondary to a  sigmoid microperforation. Recent admission in March 2025 for Klebsiella pneumonia UTI and E. Faecalis bacteremia with recommendations from ID for ertapenem 1g q24h until EOT 4/22. RVP negative.  Plan:  -Follow up with ID as directed. Scheduled for Monday 4/28/25  -Continue vaginal estradiol MWF  -Monitor for worsening s/symptoms of concerns  -Continue IV ertapenem 1gm daily as directed. Due for 2-4 weeks to be determined by ID. Estimated until 5/8/25.   -CMP, CBC with diff and CRP due weekly on Mondays starting 4/28/25 until 5/12/25. Fax results to [phone] AUSTYN [place]     Liver transplant in 2002 2/2 primary biliary cirrhosis  Comment: Chronic. Follows Dr. Ramirez.   Plan:  -Continue PTA sirolimus 1 mg every day  -Reduce prednisone down to 9mg daily for now. (5mg with 4 tablets of 1mg=9mg total)  -Continue PTA ursodiol 250 mg BID  -Follow up with transplant team as directed  -CMP, CBC with diff and CRP due weekly on Mondays starting 4/28/25 until 5/12/25. Fax results to [phone] AUSTYN [place]     Depression   Comment: Chronic. Reports feeling more down and depressed with little interest in doing things. Has a therapist in outpatient setting and has been on anti-depressants historically.   Plan:  -Monitor mood and behaviors  -Monitor for worsening s/symptoms of concerns  -Monitor for changes in mobility, eating and sleeping patterns  -Continue Zoloft 25 mg x 7 days, 50 mg every day there after  -Follow up with psych post TCU  -CMP, CBC with diff and CRP due weekly on Mondays starting 4/28/25 until 5/12/25. Fax results to [phone] AUSTYN [place]     Mild Obstructive Sleep Apnea   Nocturnal Hypoxemia   Comment: Chronic. Sleep study in 2018 showing mild RASHIDA with recommendation to start CPAP. IT does not appear that there was further follow up and not using CPAP.  Plan:  -Monitor sleeping patterns  -Monitor respiratory status  -PCP to consider sleep medicine referral  -CMP, CBC with  diff and CRP due weekly on Mondays starting 4/28/25 until 5/12/25. Fax results to [phone] AUSTYN Del [place]     CKD3b w/ moderate proteinuria, at baseline  HTN  CVD/HLD  paroxysmal atrial fibrillatin  Comment: Chronic. Baseline Cr. 1.5. History of dialysis at time of liver transplant 23 years ago. Recurrent AKIs and immunosuppressive medication toxicity. Not on SGLT2i 2/2 recurrent UTI. Chart review w/ unclear heart failure history, unconfirmed with EF 60-65% on 3/10/25. BNP 1446 elevated from 1 month ago. Given unclear HF history, trace bilateral LE edema, with some pulm edema noted on imaging, consideration for volume overload. However, without orthopnea or clear cough. Will defer diuresis.  Plan:  -Continue PTA amlodipine 2.5mg every day. HOLD if SBP<100  -Continue Meclizine 25mg TID PRN  -Continue PTA hydralazine 50mg TID. Change hold parameters to the following per preference. Hold for SBP <100mmHg.   -Continue PTA metoprolol succinate 25mg every day. HOLD if SBP<100 and/or HR<55  -Monitor BP and HR  -HOLD PTA evolocumab q2week. Follow up with PCP post TCU to resume  -Continue PTA eztimibe 10mg every day  -Continue PTA apixaban 5 mg BID  -CMP, CBC with diff and CRP due weekly on Mondays starting 4/28/25 until 5/12/25. Fax results to [phone] AUSTYN [place]         T2DM  (A1c 6.8% 4/14/25)  Comment: Chronic. Last A1c 6.8% on 4/14/25.   Plan:  -Continue PTA linagliptin 5mg every day  -Continue PTA gabapentin 300mg at bedtime  -Blood Glucose monitoring back to previous schedule of BID. HOLD off on using freestyle phil 2 sensor while on TCU  -CMP, CBC with diff and CRP due weekly on Mondays starting 4/28/25 until 5/12/25. Fax results to [phone] AUSTYN Del [place]     L cheek SCC s/p MOHS 4/8/25  Comment: Tumor debulk with perineural invasion w/ pending Tumor Board for consideration of radiation therapy. Wound does not look infected.  Plan:  -Monitor for worsening s/symptoms of  concerns  -Was scheduled for dermatology follow up appointment on 4/22, will need to be rescheduled. Discussed with Derm resident on call 4/21 who saw patient at bedside and discussed with her MOHS surgeon.      anemia, chronic  Comment: Chronic. Hgb 9.9 on admit. Stable.  Plan:  -Monitor bleeding risks  -Continue PTA ferrous sulfate daily  -Continue folic acid daily  -CMP, CBC with diff and CRP due weekly on Mondays starting 4/28/25 until 5/12/25. Fax results to [phone] AUSTYN [place]     Hypothyroidism  Papillary thyroid carcinoma  Chronic. Recent TSH~1.72 on 4/4/25  Plan;  -Continue PTA levothyroxine 175mcg qD   -Monitor for worsening s/symptoms of concerns  -CMP, CBC with diff and CRP due weekly on Mondays starting 4/28/25 until 5/12/25. Fax results to [phone] AUSTYN [place]     Chronic Shoulder Pain  Osteoarthritis   Lower back pain  Comment: Chronic. stable  Plan:  -Monitor pain complaints  -Continue diclofenac gel application to painful areas QID  -Continue Tylenol 1000mg BID   -Continue gabapentin 300mg at HS--only able to tolerate liquid given gelatin capsule allergy  -CMP, CBC with diff and CRP due weekly on Mondays starting 4/28/25 until 5/12/25. Fax results to [phone] AUSTYN [place] Rio     Constipation   Comment: Chronic. S/T polypharmacy  Plan:  -Monitor BM patterns  -Continue beneFiber qd    -Zofran PRN  -CMP, CBC with diff and CRP due weekly on Mondays starting 4/28/25 until 5/12/25. Fax results to [phone] AUSTYN [place]     Electronically signed by:  Dr. Manda Flor DNP, APRN, FNP-C, WCS-C, EDS-C     Provider reviewed records from facility, and interpreted most recent imaging/lab work, and vital signs.   Acute and chronic conditions managed by writer. Have been reviewed during today's exam.      Sincerely,        Manda Flor, LIZ CNP    Electronically signed

## 2025-04-28 NOTE — PROGRESS NOTES
[facility] TRANSPLANT INFECTIOUS DISEASE CLINIC   [address] [facility] [address]  Phone: [phone]  Fax: [phone]        Transplant Infectious Disease Consultation note  Today's Date: 04/28/2025    Recommendations:  -Continue iv ertapenem 1g iv daily, current plan is to continue till 5/8   -Daughter needs to schedule CT abdomen for the first week of May, based on which final duration of iv abx will be decided  -Orders will need to be transmitted to [facility] [phone], Fax [phone]     RTC 1 month     Previously discussed:  - standing order for urine analysis and culture has been placed. Pls collect a midstream urine sample (educated on how to collect) before starting abx when UTI symptoms develop   - standing order for fosfomycin has been placed to start if UTI symptoms develop  - will stop or continue or alter abx based on urine cx result   - drink 2.5-3L of water a day   - always wipe from front to back after urinating   - install bidet or use bath mug or squirt bottle to wash after a bowel movt   - use vaginal estrogen cream 2-3 times a week regularly   - schedule a urology appt with Dr. Gutierrez    Thank you for involving me in the care of this patient. Please do not hesitate to contact me with any questions.     Assessment:  Luz Thompson is a 74 year old with PMH of primary biliary cirrhosis s/p Liver transplant in 2002 on sirolimus, prednisone 10 mg daily and ursodiol. History also significant for paroxysmal A-fib on Eliquis with loop recorder in place, DVT, DM 2, lymphedema, HTN, HLD, CVA, hypothyroidism, Sjogren syndrome, anxiety, glaucoma, anemia, chronic back pain, CKD stage IIIb, osteoporosis, knee issues affecting mobility and [place] fever in 2016/2017 on voriconazole till June 2024.     Persistent fevers: Almost daily since Thanksgiving 2024. Low grade but some to 102 and with chills.     Left hip pain started around the same time (X ray done with some  osteophytes) and developed left cheek lesion (SCC, h/o several skin cancers).     Work up with Blood cxs, AFB blood cx, CMV PCR, EBV PCR, Crypto antigen, Cocci antigen, histo antigen, blasto antigen, fungal abs, BDG, urine cx.     Hospitalized March 2025 with fever to 102 and was diagnosed with Klebsiella pyelonephritis and Enterococcus bacteremia although source was not determined.     Hospitalized April 2025 with generalized weakness, CT abdomen showed a small collection with gas likely due to microperforation from diverticulitis (she has diverticulosis).     I suspect the cause of her symptoms/illness during the March and April 2025 admissions and probably the low grade fevers since Thanksgiving were due to diverticulitis which evolved to perforation and recognition.     She is on iv ertapenem for it which does not have great activity against enterococcus considering she had enterococcus bacteremia in March 2025. However since she is doing well will continue ertapenem alone for now.     Recurrent UTIs for the past 5 years: she has occasional stress and urge incontinence. Dysuria does not always resolve with abxs. Recent hospitalization in Sep 2024 with urosepsis/bacteremia with ESBL.    I am not sure if all her episodes of UTIs are true UTIs since she has dysuria that does not resolve with abxs and because she has occasional incontinence issues as well.     Son reported in 4/2025 that mom is stool incontinent, that may be contributing to UTIs.     Will evaluate jointly the issue with urology. Patient has not followed with urology with the fever issue. Will monitor her symptoms and how many are true UTIs vs not. Many urine cxs subsequently neg.     Hx coccidioides  Dx in winter [id number], did not tolerate fluconazole due to severe rash. Treated with voriconazole, continued until her return to [place], stopped 6/2024. CXR with stable calcifications. Recent Cocci antigen was negative and Cocci Ab by CF neg.      Hx EBV  "viremia s/p rituxan 2016: No lymphadenopathy on recent abdomen/pelvis CT. Not detected 1/20/2025     #Numerous reported antimicrobial allergies  Per allergy note 8/2/2019 \"Prick and intradermal and patch testing to fluconazole, doxycycline, azithromycin, penicillins, and cephalosporins with immediate and delayed readings being negative.\" States she will never take fluconazole again. Would be candidate for oral provocation testing in future with pcn. Also notes an allergy to the  capsules, states tolerates pills or liquid formulations, sometimes needs them compounded.    Transplant check list:  - Current immunosuppression: sirolimus, prednisone  - QTc interval: 409 ms on 4/14/25  - Bacterial coverage: as above  - Pneumocystis prophylaxis: none  - Serostatus & viral prophylaxis: EBV R+, CMV R-  - Fungal prophylaxis: none, previously voriconazole till 6/2024  - Risk factors to suggest check of Toxoplasma, Strongy, or Schisto serology?: toxoplasma negative 1/20/25  - Immunization status: Up to date on influenza, COVID; due for Tdap  - Gamma globulin status: 1110 on 8/5/2019  - Isolation status: Good Contact for ESBL history    Dr. Natividad Lundberg  Division of Infectious Disease  [facility]    Face to face time 40 mins. Total time including chart review, care-coordination and documentation time on the date of encounter - 80 mins     -------------------------------------------------------------------------------------------------------------------   Interval history:  Patient presents with son  Last visit 2/2025  She has had two admissions since that time :    3/8-3/14 for chills and fever to 102, was found to have E. Fecalis bacteremia and Klebsiella pneumoniae UTI. Work up did not reveal source of the bacteremia. ID had recommended vancomycin for 2 weeks till 3.22 and ertapenem for 3 weeks till 3.28. ertapenem was restarted around 4/8 due to positive urine cx for ESBL Klebsiella oxytoca and " Proteus vulgaris.     She was hospitalized 4/14 due to generalized weakness since after Mohs procedure to the left cheek 2 days prior. Due to abdominal tenderness on exam a CT abdomen was obtained on 4/16 which showed  colonic diverticulosis, trace fluid, fat stranding, and edema around the distal sigmoid colon. Increased size of a fluid density w/ a focus of air (2.3 x 1.7 cm). This may represent diverticulitis with a contained microperforation.Of note, in 12/2024 she was found to a left colonic diverticula and small amount of fluid adjacent to the sigmoid colon possibly secondary to acute diverticulitis. She also has a remote history of rectal perforation w/ retroflexion on colonoscopy. CRS did not plan on surgery. She was dced on iv ertapenem for for planned 2-4 weeks duration (start date 4/16) pending CT abdomen.     The CT abdomen has not yet been scheduled. Patient reports that her daughter knows to schedule and is working on it.     She is at [facility] getting iv abxs and rehab. She reports she is doing well. Her right arm picc is working well and she is tolerating ertapenem. She had Lab work today, CBC, CMP ok except for leucocytosis to 14. She feels well and has no localizing symptoms. CRP has decreased to normal.     Son privately reported that his wife has to clean up patient many times off feces when visiting and if that may be contributing to UTIs. This information has been noted, will discuss with patient next visit     Interval History: 2/2025  I last saw her 12/2   She developed persistent fevers subsequently and was admitted multiple times at outside facilities and at [facility] 1/19 to 1/23/25. Work up for fevers was unrevealing including Blood cxs, AFB blood cx, CMV, EBV, Crypto, Cocci antigen, histo antigen, blasto antigen, fungal abs, BDG, urine cx.     Fevers started shortly before thanksgiving 2024. Low grade fevers, usually 99. Sometimes 102, sometimes associated with chills.   99.2  this morning     Has been having Hip pain left for the past month - last hospital stay told it was due to the arthritis after an x ray- it waxes and waning and is worse in the morning     Left cheek lesion, started in Nov 2024. It started as a pimple and then erupted. It Weeps occasionally - clear drainage usually.   She has another skin lesion under her neck for a year now   She has had several skin cancers and assumes these are the same.   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 This was when she was diagnosed with [place] fever   Moved to [place] July 2024     Had blood in the urine a week ago (first time and only one episode) she was otherwise asymptomatic. She took fosfomycin 2 paks one day apart. UA/UC done the day after the hematuria by which time it had cleared was negative     Change in meds in the last 6 months: BP med in the last month and voriconazole stoppage in June 2024 only changes     CRP in Jan was normal     Reason for consult / Chief complaint: 12/2/24  Consulted by Dr. Ramirez for recurrent UTIs    History of presenting illness:  Luz Thompson is a 74 year old with PMH of primary biliary cirrhosis s/p Liver transplant in 2002 on sirolimus, prednisone 10 mg daily and ursodiol. History also significant for paroxysmal A-fib on Eliquis with loop recorder in place, DVT, DM 2, lymphedema, HTN, HLD, CVA, hypothyroidism, Sjogren syndrome, anxiety, glaucoma, anemia, chronic back pain, CKD stage IIIb, osteoporosis, knee issues affecting mobility.     She reports recurrent UTI for many years, does not  remember since when. But it is a lot more frequent in the past 5 years. They come in bouts and then regress. She reports 3 in the past 6 weeks   She is now on Fosfomycin - 3 paks - last dose 11/31. She had low grade fevers to 99.2-99.6 with this last bout of UTI, no fevers since yesterday.   She was recently Hospitalized twice in [facility] for urosepsis in Sep and possible UTI in Oct 2024. In Sep blood cx and urine cx pos for same organism and rcvd meropenem for 10 days). Readmitted in Oct for dysuria and rcvd meropenem for 14 days     She reports that Abxs do not completely resolve symptoms     Symptoms of UTI: end of urinary stream is painful, has bladder spasms - suprapubic pain, low grade fever     She has many abx allergies, and with increasing bacterial resistance, she can take only macrobid or fosfomycin. For macrobid she is allergic to the  capsules, therefore it has to be a pill or liquid formulation or compounding pharmacy    She has Occasional stress incontinence when her bladder is already full. She has Urge incontinence when she has an infection      Due to shoulder issue, she is not able to clean herself well after a bowel movt    She reports that vagina feels dry   She is Supposed to be using vainal estrogen cream every other day but forgets   She drinks 5, 16 ounce bottles of water a day - 2.5 L a day     Last two urine samples were collected via straight cath as difficult to collect it herself when not at home as the toilet is too high and not close to the sink. 11/6 urine cx no growth. On fosfomycin.     She has a urologist PA in [place]. A specialty urology referral at the  was placed on 12/1.   Ct abdomen is scheduled for 12/17   She reports having Cystoscopy in the past year in [place] and that looked ok   She does not think UDS has been done before     She Moved from [facility] to the [facility] in July. 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    Bacteremia due to Enterococcus    Prophylactic antibiotic    High risk medication use    Statin intolerance    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    Infection due to Klebsiella pneumoniae    Sensorineural hearing loss (SNHL) of both ears    Abnormal liver function tests    Liver replaced by transplant (H)    Hyperlipidemia LDL goal <70    Hypertension goal BP (blood pressure) < 140/80    Postoperative hypothyroidism    Type 2 diabetes mellitus with stage 3b chronic kidney disease, without long-term current use of insulin (H)    Age-related osteoporosis without current pathological fracture    Tympanic membrane perforation, left    Other infective chronic otitis externa of left ear    Stage 3b chronic kidney disease (H)    Escherichia coli sepsis (H)    Physical deconditioning    Immunosuppression    Papillary thyroid carcinoma (H)    PAF (paroxysmal atrial fibrillation) (H)    Status post liver transplantation (H)    Elevated lactic acid level    Fever in adult    Urinary tract infection    Chronic kidney disease, unspecified CKD stage    Lung infection    Acute cystitis without hematuria    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    Pyelonephritis, acute    Generalized abdominal pain    Left flank pain    Immunocompromised    H/O liver transplant (H)    Sepsis without acute organ dysfunction, due to unspecified organism (H)    Immunosuppressed status    Transient hypotension    Pneumonia due to infectious organism, unspecified laterality, unspecified part of lung    Pneumonia    Shortness of breath    Fever    DERREK (acute kidney injury)    Sepsis secondary to UTI (H)    Hard of hearing    Chronic diastolic heart failure (H)    UTI (urinary tract infection)         Allergies:   Allergies   Allergen Reactions    Fluconazole Hives and  Itching     Full body hives  **Intradermal skin testing negative**  [See intradermal skin testing results from 8/2/2019]    Mycophenolate Diarrhea and Nausea and Vomiting     Patient stated it was chronic and lasted months      Penicillins Anaphylaxis, Hives, Itching and Rash     **Intradermal skin testing negative**  [See intradermal skin testing results from 8/2/2019]      Simvastatin Muscle Pain (Myalgia)     severe  Other reaction(s): Myalgia caused by statin    Methotrexate Other (See Comments)     Other reaction(s): Sore  Sores in mouth, esophagus, and stomach.       Morphine And Codeine Itching and Other (See Comments)     Psych disturbance  Other reaction(s): Confusion, Mood alteration    Quinolones Anxiety, Dizziness, Headache, Other (See Comments), Palpitations and Unknown     Other reaction(s): Hyperactive behavior, Lightheadedness, Mood alteration    Dizzy, light headed    Dizziness, shaky, and jumpy    Capsules, Empty Gelatin [Gelatin]     Carvedilol Diarrhea     Per pt - severe diarrhea and LE swelling  + tolerating Toprol    Lansoprazole Diarrhea    Strawberry Extract     Azithromycin Itching     [See intradermal skin testing results from 8/2/2019]    Bactrim [Sulfamethoxazole-Trimethoprim] Other (See Comments)     Numb mouth, tingling lips (treated with anti-histamines)    Cephalosporins Itching     [See intradermal skin testing results from 8/2/2019]    Ciprofloxacin Hcl Other (See Comments) and Dizziness     Insomnia, mood lability, Irregular heart beat         Doxycycline Itching and Unknown     [See intradermal skin testing results from 8/2/2019]    Lisinopril Cough    Omeprazole Itching    Tolectin [Nsaids] Rash    Tolmetin Rash and Itching    Tramadol Rash, Hives and Itching         Medications:  Current Outpatient Medications   Medication Sig Dispense Refill    acetaminophen (TYLENOL) 500 MG tablet Take 1,000 mg by mouth 2 times daily. During 4/16/25 med [facility] pt states take BID everyday       amLODIPine (NORVASC) 2.5 MG tablet Take 1 tablet (2.5 mg) by mouth daily. HOLD if SBP<100      apixaban ANTICOAGULANT (ELIQUIS) 5 MG tablet Take 1 tablet (5 mg) by mouth 2 times daily. 180 tablet 1    calcitRIOL (ROCALTROL) 0.25 MCG capsule Take 1 capsule (0.25 mcg) by mouth daily. 90 capsule 1    Continuous Glucose Sensor (FREESTYLE NINO 2 SENSOR) MISC Change every 14 days. 2 each 5    D-MANNOSE PO Take 1 Scoop by mouth 2 times daily.      diclofenac (VOLTAREN) 1 % topical gel Apply 4 g topically 4 times daily.      diphenhydrAMINE (BENADRYL) 25 MG tablet Take 25 mg by mouth every 6 hours as needed for itching or allergies.      EPINEPHrine (ANY BX GENERIC EQUIV) 0.3 MG/0.3ML injection 2-pack Inject 0.3 mLs (0.3 mg) into the muscle as needed for anaphylaxis. May repeat one time in 5-15 minutes if response to initial dose is inadequate. 2 each 1    ERTAPENEM SODIUM IV Inject 1 g into the vein daily.      estradiol (ESTRACE) 0.1 MG/GM vaginal cream Place vaginally three times a week.      evolocumab (REPATHA SURECLICK) 140 MG/ML prefilled autoinjector Inject 1 mL (140 mg) subcutaneously every 14 days. . Please have fasting labs drawn for further refills. 6 mL 3    ezetimibe (ZETIA) 10 MG tablet Take 1 tablet (10 mg) by mouth daily. 90 tablet 3    Ferrous Sulfate 324 MG TBEC Take 1 tablet by mouth daily.      folic acid (FOLVITE) 1 MG tablet TAKE 1 TABLET BY MOUTH DAILY 90 tablet 3    gabapentin (NEURONTIN) 250 MG/5ML solution Take 6 mLs (300 mg) by mouth at bedtime 180 mL 0    Glucagon (GVOKE HYPOPEN) 1 MG/0.2ML pen Inject the contents of 1 device under the skin into lower abdomen, outer thigh, or outer upper arm as needed for hypoglycemia. If no response after 15 minutes, additional 1 mg dose from a new device may be injected while waiting for emergency assistance.      glucose (BD GLUCOSE) 5 g chewable tablet Take 2 tablets (10 g) by mouth as needed (low blood sugar) 40 tablet 1    hydrALAZINE (APRESOLINE) 50 MG  tablet Take 1 tablet (50 mg) by mouth 3 times daily. hold if SBP <110mmHg.      ketoconazole (NIZORAL) 2 % external shampoo Apply topically twice a week. Lather in the shower, wait 3-5 minutes before rinsing.      levothyroxine (SYNTHROID/LEVOTHROID) 175 MCG tablet Take 1 tablet (175 mcg) by mouth daily. 90 tablet 1    linagliptin (TRADJENTA) 5 MG TABS tablet Take 1 tablet (5 mg) by mouth daily. 90 tablet 1    meclizine (ANTIVERT) 25 MG tablet Take 1 tablet (25 mg) by mouth 3 times daily as needed for dizziness or other (migraines).      melatonin 1 MG TABS tablet Take 1 tablet (1 mg) by mouth nightly as needed for sleep.      metoprolol succinate ER (TOPROL XL) 25 MG 24 hr tablet Take 25 mg by mouth daily. HOLD if SBP<100 and/or HR<55      Multiple Vitamins-Minerals (PRESERVISION AREDS 2) CAPS Take 1 capsule by mouth 2 times daily      NONFORMULARY Apply 1 Application topically daily as needed. Momma Bear Nerve Lotion - pt uses on feet      omega-3 acid ethyl esters (LOVAZA) 1 g capsule Take 1 capsule (1 g) by mouth 2 times daily.      omega-3 acid ethyl esters (LOVAZA) 1 g capsule Take 1 capsule by mouth 2 times daily. 180 capsule 1    ondansetron (ZOFRAN ODT) 4 MG ODT tab Take 1 tablet (4 mg) by mouth every 6 hours as needed.      predniSONE (DELTASONE) 1 MG tablet Take 4 tablets (4 mg) by mouth daily. Take 4mg (1mg tablets x4) and Take with 5mg tablet to equal 9mg daily 90 tablet 0    predniSONE (DELTASONE) 5 MG tablet Take 1 tablet (5 mg) by mouth daily. Take with 5mg tablet with 4mg (1mg tablets x4) to equal 9mg daily 30 tablet 0    sertraline (ZOLOFT) 25 MG tablet Take 1 tablet (25 mg) by mouth daily for 7 days. 7 tablet 0    [START ON 5/4/2025] sertraline (ZOLOFT) 50 MG tablet Take 1 tablet (50 mg) by mouth daily. 30 tablet 0    sirolimus (GENERIC EQUIVALENT) 1 MG tablet Take 1 tablet (1 mg) by mouth daily. 90 tablet 3    triamcinolone (KENALOG) 0.1 % external ointment Apply topically 2 times daily. Use 2  "weeks or less. 80 g 0    ursodiol (ACTIGALL) 250 MG tablet Take 1 tablet (250 mg) by mouth 2 times daily. 180 tablet 3    glucose 40 % (400 mg/mL) gel Take 15-30 g by mouth every 15 minutes as needed for low blood sugar.      Nutrisource Fiber PO packet Take 1 packet by mouth daily. Benefiber       No current facility-administered medications for this visit.         Immunizations:  Immunization History   Administered Date(s) Administered    COVID-19 12+ (Pfizer) 10/17/2024    COVID-19 MONOVALENT 12+ (Pfizer) 02/21/2021, 03/15/2021, 03/29/2021, 10/09/2021    Flu 65+ (Fluad) 12/21/2019    [id number] Novel Flu P-free 11/10/2009    HEPA 07/01/2001    HepB, Unspecified 07/01/2001    Influenza (High Dose) Trivalent,PF (Fluzone) 09/25/2015, 10/30/2016, 10/23/2017, 10/24/2024    Influenza (IIV3) PF 11/10/2004, 09/29/2007, 10/01/2010, 09/27/2012, 10/29/2012, 09/30/2013, 09/01/2016    Influenza Vaccine 18-64 (Flublok) 10/20/2018    Influenza Vaccine 65+ (Fluzone HD) 11/03/2020, 03/14/2022, 10/20/2022    Influenza Vaccine, 6+MO IM (QUADRIVALENT W/PRESERVATIVES) 10/01/2020    Pneumo Conj 13-V (2010&after) 02/26/2014, 01/01/2016    Pneumococcal 23 valent 12/01/2007, 01/01/2014, 05/25/2016    TD,PF 7+ (Tenivac) 01/01/2007    TDAP (Adacel,Boostrix) 01/01/2014    TDAP Vaccine (Adacel) 09/17/2007, 02/26/2014         Examination:  Vital signs:   /62 (BP Location: Left arm, Patient Position: Sitting)   Pulse 78   Ht 1.702 m (5' 7\")   Wt 82.1 kg (181 lb)   LMP 06/01/1988 (Approximate)   SpO2 94%   BMI 28.35 kg/m    Constitutional: Patient in no distress, in a wheel chair, deconditioned but able to stand and turn   Eyes: pale, not jaundiced  Abdomen: soft, BS+  Skin: no rash  Right arm picc looks ok   Extremities: +pedal edema  Psych: Alert and oriented x 3    Laboratory:  Hematology:  Recent Labs   Lab Test 04/28/25  1213 04/20/25  0637 04/19/25  0714   WBC 14.2* 8.3 6.0   RBC 3.70* 3.26* 3.22*   HGB 9.9* 8.6* 8.7*   HCT 32.7* " 28.5* 29.5*   MCV 88 87 92   MCH 26.8 26.4* 27.0   MCHC 30.3* 30.2* 29.5*   RDW 18.3* 17.5* 17.4*   * 401 383       Chemistry:  Recent Labs   Lab Test 04/23/25  0754 04/23/25  0156 04/22/25  2124 04/20/25  0950 04/20/25  0637 04/19/25  0834 04/19/25  0714 04/18/25  0852 04/18/25  0618   NA  --   --   --   --  143  --  144  --  141   POTASSIUM  --   --   --   --  4.2  --  4.4  --  4.2   CHLORIDE  --   --   --   --  110*  --  111*  --  109*   CO2  --   --   --   --  23  --  24  --  22   ANIONGAP  --   --   --   --  10  --  9  --  10   * 156* 236*   < > 113*   < > 116*   < > 122*   BUN  --   --   --   --  35.6*  --  33.5*  --  35.3*   CR  --   --   --   --  1.33*  --  1.32*  --  1.40*   KEILY  --   --   --   --  8.8  --  8.8  --  8.7*    < > = values in this interval not displayed.       Liver Function Studies:  Recent Labs   Lab Test 04/28/25  1213   PROTTOTAL 6.5   ALBUMIN 3.7   BILITOTAL 0.2   ALKPHOS 91   AST 19   ALT 11         Immune Globulin Studies:  Recent Labs   Lab Test 08/05/19  1552   IGG 1,110             Microbiology:  3/8/25 blood cx enterococcus fecalis    Jan 2025: Blood cxs, AFB blood cx, CMV PCR, EBV PCR, Crypto antigen, Cocci antigen, histo antigen, blasto antigen, fungal abs, BDG, urine cx    9/22/24 blood cx ESBL ,, and Pseudomonas aeruginosa     Urine cxs  4/7/25 50-100k ESBL Klebsiella oxytoca and Proteus vulgaris   3/8/25 >100k Klebsiella pneumoniae  2/7/25 10-50k mixed louann   1/22/25 10-50k mixed louann   1/19/25 <10k mixed louann   1/10/25 <10k mixed louann  12/10/24 >100k Klebsiella pneumoniae  11/6/24 - no growth   10/11/24 50-100k mixed urogenital louann (before abx was administered but she might have taken oral abxs prior)  9/22/24 >100k ESBL Klebsiella oxytoca  8/20/24 >100k ESBL,,    Imaging:  CT abdomen pelvis 4/16/25  1.  New fat stranding and mesenteric edema about the distal sigmoid  colon in the midline, nonspecific, but may represent  diverticulitis.  Suspected contained microperforation with small fluid collection  containing tiny focus of air posterior to the sigmoid colon.  2.  Distended bladder within normal limits.  3.  Unchanged fluid density 1.2 cm lesion in the region of the  pancreatic head.  4.  Stable postsurgical changes of liver transplant and curvilinear  hypodensity in the inferior right lobe.  5.  Other chronic CT findings including small bilateral pleural  effusions and right greater than left basilar calcified opacities are  unchanged.    CT abdomen pelvis 3/8/25  1. Postsurgical changes of liver transplant, subtle curvilinear  hypodensity in the inferior liver may represent peripheral biliary  ductal dilatation or edema, similar to slightly more conspicuous  compared to prior CT scan; if persistent clinical concern for  cholangitis, consider gastroenterology consult and/or MRCP.  2. Fluid density 1.4 cm lesion in the pancreatic head region, may  represent pancreatic cyst/IPMN, consider follow-up as suggested  below..  3. Diverticulosis without definite diverticulitis  4. Additional findings similar to prior CT from 1/22/2025, including  right lung base calcifications and  densities.

## 2025-04-28 NOTE — NURSING NOTE
"Chief Complaint   Patient presents with    RECHECK     Liver replaced by transplant (H)  Bladder infection, chronic     /62 (BP Location: Left arm, Patient Position: Sitting)   Pulse 78   Ht 1.702 m (5' 7\")   Wt 82.1 kg (181 lb)   LMP 06/01/1988 (Approximate)   SpO2 94%   BMI 28.35 kg/m    Junior Mack CMA on 4/28/2025 at 1:54 PM    "

## 2025-04-28 NOTE — LETTER
4/28/2025       RE: Luz Thompson  [address]  [facility] [address]     Dear Colleague,    Thank you for referring your patient, Luz Thompson, to the [facility] INFECTIOUS DISEASE CLINIC [place] at [facility]. Please see a copy of my visit note below.            [facility] TRANSPLANT INFECTIOUS DISEASE CLINIC   [address] [facility] [address]  Phone: [phone]  Fax: [phone]        Transplant Infectious Disease Consultation note  Today's Date: 04/28/2025    Recommendations:  -Continue iv ertapenem 1g iv daily, current plan is to continue till 5/8   -Daughter needs to schedule CT abdomen for the first week of May, based on which final duration of iv abx will be decided  -Orders will need to be transmitted to [facility] [phone], Fax [phone]     RTC 1 month     Previously discussed:  - standing order for urine analysis and culture has been placed. Pls collect a midstream urine sample (educated on how to collect) before starting abx when UTI symptoms develop   - standing order for fosfomycin has been placed to start if UTI symptoms develop  - will stop or continue or alter abx based on urine cx result   - drink 2.5-3L of water a day   - always wipe from front to back after urinating   - install bidet or use bath mug or squirt bottle to wash after a bowel movt   - use vaginal estrogen cream 2-3 times a week regularly   - schedule a urology appt with Dr. Gutierrez    Thank you for involving me in the care of this patient. Please do not hesitate to contact me with any questions.     Assessment:  Luz Thompson is a 74 year old with PMH of primary biliary cirrhosis s/p Liver transplant in 2002 on sirolimus, prednisone 10 mg daily and ursodiol. History also significant for paroxysmal A-fib on Eliquis with loop recorder in place, DVT, DM 2, lymphedema, HTN, HLD, CVA, hypothyroidism, Sjogren syndrome, anxiety,  glaucoma, anemia, chronic back pain, CKD stage IIIb, osteoporosis, knee issues affecting mobility and [place] fever in 2016/2017 on voriconazole till June 2024.     Persistent fevers: Almost daily since Thanksgiving 2024. Low grade but some to 102 and with chills.     Left hip pain started around the same time (X ray done with some osteophytes) and developed left cheek lesion (SCC, h/o several skin cancers).     Work up with Blood cxs, AFB blood cx, CMV PCR, EBV PCR, Crypto antigen, Cocci antigen, histo antigen, blasto antigen, fungal abs, BDG, urine cx.     Hospitalized March 2025 with fever to 102 and was diagnosed with Klebsiella pyelonephritis and Enterococcus bacteremia although source was not determined.     Hospitalized April 2025 with generalized weakness, CT abdomen showed a small collection with gas likely due to microperforation from diverticulitis (she has diverticulosis).     I suspect the cause of her symptoms/illness during the March and April 2025 admissions and probably the low grade fevers since Thanksgiving were due to diverticulitis which evolved to perforation and recognition.     She is on iv ertapenem for it which does not have great activity against enterococcus considering she had enterococcus bacteremia in March 2025. However since she is doing well will continue ertapenem alone for now.     Recurrent UTIs for the past 5 years: she has occasional stress and urge incontinence. Dysuria does not always resolve with abxs. Recent hospitalization in Sep 2024 with urosepsis/bacteremia with ESBL.    I am not sure if all her episodes of UTIs are true UTIs since she has dysuria that does not resolve with abxs and because she has occasional incontinence issues as well.     Son reported in 4/2025 that mom is stool incontinent, that may be contributing to UTIs.     Will evaluate jointly the issue with urology. Patient has not followed with urology with the fever issue. Will monitor her symptoms and  "how many are true UTIs vs not. Many urine cxs subsequently neg.     Hx coccidioides  Dx in winter 6065-5923, did not tolerate fluconazole due to severe rash. Treated with voriconazole, continued until her return to [place], stopped 6/2024. CXR with stable calcifications. Recent Cocci antigen was negative and Cocci Ab by CF neg.      Hx EBV viremia s/p rituxan 2016: No lymphadenopathy on recent abdomen/pelvis CT. Not detected 1/20/2025     #Numerous reported antimicrobial allergies  Per allergy note 8/2/2019 \"Prick and intradermal and patch testing to fluconazole, doxycycline, azithromycin, penicillins, and cephalosporins with immediate and delayed readings being negative.\" States she will never take fluconazole again. Would be candidate for oral provocation testing in future with pcn. Also notes an allergy to the  capsules, states tolerates pills or liquid formulations, sometimes needs them compounded.    Transplant check list:  - Current immunosuppression: sirolimus, prednisone  - QTc interval: 409 ms on 4/14/25  - Bacterial coverage: as above  - Pneumocystis prophylaxis: none  - Serostatus & viral prophylaxis: EBV R+, CMV R-  - Fungal prophylaxis: none, previously voriconazole till 6/2024  - Risk factors to suggest check of Toxoplasma, Strongy, or Schisto serology?: toxoplasma negative 1/20/25  - Immunization status: Up to date on influenza, COVID; due for Tdap  - Gamma globulin status: 1110 on 8/5/2019  - Isolation status: Good Contact for ESBL history    Dr. Natividad Lundberg  Division of Infectious Disease  [facility]    Face to face time 40 mins. Total time including chart review, care-coordination and documentation time on the date of encounter - 80 mins     -------------------------------------------------------------------------------------------------------------------   Interval history:  Patient presents with son  Last visit 2/2025  She has had two admissions since that time " :    3/8-3/14 for chills and fever to 102, was found to have E. Fecalis bacteremia and Klebsiella pneumoniae UTI. Work up did not reveal source of the bacteremia. ID had recommended vancomycin for 2 weeks till 3.22 and ertapenem for 3 weeks till 3.28. ertapenem was restarted around 4/8 due to positive urine cx for ESBL Klebsiella oxytoca and Proteus vulgaris.     She was hospitalized 4/14 due to generalized weakness since after Mohs procedure to the left cheek 2 days prior. Due to abdominal tenderness on exam a CT abdomen was obtained on 4/16 which showed  colonic diverticulosis, trace fluid, fat stranding, and edema around the distal sigmoid colon. Increased size of a fluid density w/ a focus of air (2.3 x 1.7 cm). This may represent diverticulitis with a contained microperforation.Of note, in 12/2024 she was found to a left colonic diverticula and small amount of fluid adjacent to the sigmoid colon possibly secondary to acute diverticulitis. She also has a remote history of rectal perforation w/ retroflexion on colonoscopy. CRS did not plan on surgery. She was dced on iv ertapenem for for planned 2-4 weeks duration (start date 4/16) pending CT abdomen.     The CT abdomen has not yet been scheduled. Patient reports that her daughter knows to schedule and is working on it.     She is at [facility] getting iv abxs and rehab. She reports she is doing well. Her right arm picc is working well and she is tolerating ertapenem. She had Lab work today, CBC, CMP ok except for leucocytosis to 14. She feels well and has no localizing symptoms. CRP has decreased to normal.     Son privately reported that his wife has to clean up patient many times off feces when visiting and if that may be contributing to UTIs. This information has been noted, will discuss with patient next visit     Interval History: 2/2025  I last saw her 12/2   She developed persistent fevers subsequently and was admitted multiple times at  outside facilities and at [facility] 1/19 to 1/23/25. Work up for fevers was unrevealing including Blood cxs, AFB blood cx, CMV, EBV, Crypto, Cocci antigen, histo antigen, blasto antigen, fungal abs, BDG, urine cx.     Fevers started shortly before thanksgiving 2024. Low grade fevers, usually 99. Sometimes 102, sometimes associated with chills.   99.2 this morning     Has been having Hip pain left for the past month - last hospital stay told it was due to the arthritis after an x ray- it waxes and waning and is worse in the morning     Left cheek lesion, started in Nov 2024. It started as a pimple and then erupted. It Weeps occasionally - clear drainage usually.   She has another skin lesion under her neck for a year now   She has had several skin cancers and assumes these are the same.   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 This was when she was diagnosed with [place] fever   Moved to [place] July 2024     Had blood in the urine a week ago (first time and only one episode) she was otherwise asymptomatic. She took fosfomycin 2 paks one day apart. UA/UC done the day after the hematuria by which time it had cleared was negative     Change in meds in the last 6 months: BP med in the last month and voriconazole stoppage in June 2024 only changes     CRP in Jan was normal     Reason for consult / Chief complaint: 12/2/24  Consulted by Dr. Ramirez for recurrent UTIs    History of presenting illness:  Luz Thompson is a 74 year old with PMH of primary biliary cirrhosis s/p Liver transplant in 2002 on sirolimus, prednisone  10 mg daily and ursodiol. History also significant for paroxysmal A-fib on Eliquis with loop recorder in place, DVT, DM 2, lymphedema, HTN, HLD, CVA, hypothyroidism, Sjogren syndrome, anxiety, glaucoma, anemia, chronic back pain, CKD stage IIIb, osteoporosis, knee issues affecting mobility.     She reports recurrent UTI for many years, does not remember since when. But it is a lot more frequent in the past 5 years. They come in bouts and then regress. She reports 3 in the past 6 weeks   She is now on Fosfomycin - 3 paks - last dose 11/31. She had low grade fevers to 99.2-99.6 with this last bout of UTI, no fevers since yesterday.   She was recently Hospitalized twice in [facility] for urosepsis in Sep and possible UTI in Oct 2024. In Sep blood cx and urine cx pos for same organism and rcvd meropenem for 10 days). Readmitted in Oct for dysuria and rcvd meropenem for 14 days     She reports that Abxs do not completely resolve symptoms     Symptoms of UTI: end of urinary stream is painful, has bladder spasms - suprapubic pain, low grade fever     She has many abx allergies, and with increasing bacterial resistance, she can take only macrobid or fosfomycin. For macrobid she is allergic to the  capsules, therefore it has to be a pill or liquid formulation or compounding pharmacy    She has Occasional stress incontinence when her bladder is already full. She has Urge incontinence when she has an infection      Due to shoulder issue, she is not able to clean herself well after a bowel movt    She reports that vagina feels dry   She is Supposed to be using vainal estrogen cream every other day but forgets   She drinks 5, 16 ounce bottles of water a day - 2.5 L a day     Last two urine samples were collected via straight cath as difficult to collect it herself when not at home as the toilet is too high and not close to the sink. 11/6 urine cx no growth. On fosfomycin.     She has a urologist PA in [place]. A specialty  urology referral at the  was placed on 12/1.   Ct abdomen is scheduled for 12/17   She reports having Cystoscopy in the past year in [place] and that looked ok   She does not think UDS has been done before     She Moved from [facility] to the [facility] in July. 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     Bacteremia due to Enterococcus     Prophylactic antibiotic     High risk medication use     Statin intolerance     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     Infection due to Klebsiella pneumoniae     Sensorineural hearing loss (SNHL) of both ears     Abnormal liver function tests     Liver replaced by transplant (H)     Hyperlipidemia LDL goal <70     Hypertension goal BP (blood pressure) < 140/80     Postoperative hypothyroidism     Type 2 diabetes mellitus with stage 3b chronic kidney disease, without long-term current use of insulin (H)     Age-related osteoporosis without current pathological fracture     Tympanic membrane perforation, left     Other infective chronic otitis externa of left ear     Stage 3b chronic kidney disease (H)     Escherichia coli sepsis (H)     Physical deconditioning     Immunosuppression     Papillary thyroid carcinoma (H)     PAF (paroxysmal atrial fibrillation) (H)     Status post liver transplantation (H)     Elevated lactic acid level     Fever in adult     Urinary tract infection     Chronic kidney disease, unspecified CKD stage     Lung infection     Acute cystitis without hematuria     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     Pyelonephritis, acute     Generalized abdominal pain     Left flank pain     Immunocompromised     H/O liver transplant (H)     Sepsis without acute organ dysfunction, due to unspecified organism (H)     Immunosuppressed  status     Transient hypotension     Pneumonia due to infectious organism, unspecified laterality, unspecified part of lung     Pneumonia     Shortness of breath     Fever     DERREK (acute kidney injury)     Sepsis secondary to UTI (H)     Hard of hearing     Chronic diastolic heart failure (H)     UTI (urinary tract infection)         Allergies:   Allergies   Allergen Reactions     Fluconazole Hives and Itching     Full body hives  **Intradermal skin testing negative**  [See intradermal skin testing results from 8/2/2019]     Mycophenolate Diarrhea and Nausea and Vomiting     Patient stated it was chronic and lasted months       Penicillins Anaphylaxis, Hives, Itching and Rash     **Intradermal skin testing negative**  [See intradermal skin testing results from 8/2/2019]       Simvastatin Muscle Pain (Myalgia)     severe  Other reaction(s): Myalgia caused by statin     Methotrexate Other (See Comments)     Other reaction(s): Sore  Sores in mouth, esophagus, and stomach.        Morphine And Codeine Itching and Other (See Comments)     Psych disturbance  Other reaction(s): Confusion, Mood alteration     Quinolones Anxiety, Dizziness, Headache, Other (See Comments), Palpitations and Unknown     Other reaction(s): Hyperactive behavior, Lightheadedness, Mood alteration    Dizzy, light headed    Dizziness, shaky, and jumpy     Capsules, Empty Gelatin [Gelatin]      Carvedilol Diarrhea     Per pt - severe diarrhea and LE swelling  + tolerating Toprol     Lansoprazole Diarrhea     Strawberry Extract      Azithromycin Itching     [See intradermal skin testing results from 8/2/2019]     Bactrim [Sulfamethoxazole-Trimethoprim] Other (See Comments)     Numb mouth, tingling lips (treated with anti-histamines)     Cephalosporins Itching     [See intradermal skin testing results from 8/2/2019]     Ciprofloxacin Hcl Other (See Comments) and Dizziness     Insomnia, mood lability, Irregular heart beat          Doxycycline Itching and  Unknown     [See intradermal skin testing results from 8/2/2019]     Lisinopril Cough     Omeprazole Itching     Tolectin [Nsaids] Rash     Tolmetin Rash and Itching     Tramadol Rash, Hives and Itching         Medications:  Current Outpatient Medications   Medication Sig Dispense Refill     acetaminophen (TYLENOL) 500 MG tablet Take 1,000 mg by mouth 2 times daily. During 4/16/25 med recc pt states take BID everyday       amLODIPine (NORVASC) 2.5 MG tablet Take 1 tablet (2.5 mg) by mouth daily. HOLD if SBP<100       apixaban ANTICOAGULANT (ELIQUIS) 5 MG tablet Take 1 tablet (5 mg) by mouth 2 times daily. 180 tablet 1     calcitRIOL (ROCALTROL) 0.25 MCG capsule Take 1 capsule (0.25 mcg) by mouth daily. 90 capsule 1     Continuous Glucose Sensor (FREESTYLE NINO 2 SENSOR) MISC Change every 14 days. 2 each 5     D-MANNOSE PO Take 1 Scoop by mouth 2 times daily.       diclofenac (VOLTAREN) 1 % topical gel Apply 4 g topically 4 times daily.       diphenhydrAMINE (BENADRYL) 25 MG tablet Take 25 mg by mouth every 6 hours as needed for itching or allergies.       EPINEPHrine (ANY BX GENERIC EQUIV) 0.3 MG/0.3ML injection 2-pack Inject 0.3 mLs (0.3 mg) into the muscle as needed for anaphylaxis. May repeat one time in 5-15 minutes if response to initial dose is inadequate. 2 each 1     ERTAPENEM SODIUM IV Inject 1 g into the vein daily.       estradiol (ESTRACE) 0.1 MG/GM vaginal cream Place vaginally three times a week.       evolocumab (REPATHA SURECLICK) 140 MG/ML prefilled autoinjector Inject 1 mL (140 mg) subcutaneously every 14 days. . Please have fasting labs drawn for further refills. 6 mL 3     ezetimibe (ZETIA) 10 MG tablet Take 1 tablet (10 mg) by mouth daily. 90 tablet 3     Ferrous Sulfate 324 MG TBEC Take 1 tablet by mouth daily.       folic acid (FOLVITE) 1 MG tablet TAKE 1 TABLET BY MOUTH DAILY 90 tablet 3     gabapentin (NEURONTIN) 250 MG/5ML solution Take 6 mLs (300 mg) by mouth at bedtime 180 mL 0      Glucagon (GVOKE HYPOPEN) 1 MG/0.2ML pen Inject the contents of 1 device under the skin into lower abdomen, outer thigh, or outer upper arm as needed for hypoglycemia. If no response after 15 minutes, additional 1 mg dose from a new device may be injected while waiting for emergency assistance.       glucose (BD GLUCOSE) 5 g chewable tablet Take 2 tablets (10 g) by mouth as needed (low blood sugar) 40 tablet 1     hydrALAZINE (APRESOLINE) 50 MG tablet Take 1 tablet (50 mg) by mouth 3 times daily. hold if SBP <110mmHg.       ketoconazole (NIZORAL) 2 % external shampoo Apply topically twice a week. Lather in the shower, wait 3-5 minutes before rinsing.       levothyroxine (SYNTHROID/LEVOTHROID) 175 MCG tablet Take 1 tablet (175 mcg) by mouth daily. 90 tablet 1     linagliptin (TRADJENTA) 5 MG TABS tablet Take 1 tablet (5 mg) by mouth daily. 90 tablet 1     meclizine (ANTIVERT) 25 MG tablet Take 1 tablet (25 mg) by mouth 3 times daily as needed for dizziness or other (migraines).       melatonin 1 MG TABS tablet Take 1 tablet (1 mg) by mouth nightly as needed for sleep.       metoprolol succinate ER (TOPROL XL) 25 MG 24 hr tablet Take 25 mg by mouth daily. HOLD if SBP<100 and/or HR<55       Multiple Vitamins-Minerals (PRESERVISION AREDS 2) CAPS Take 1 capsule by mouth 2 times daily       NONFORMULARY Apply 1 Application topically daily as needed. Momma Bear Nerve Lotion - pt uses on feet       omega-3 acid ethyl esters (LOVAZA) 1 g capsule Take 1 capsule (1 g) by mouth 2 times daily.       omega-3 acid ethyl esters (LOVAZA) 1 g capsule Take 1 capsule by mouth 2 times daily. 180 capsule 1     ondansetron (ZOFRAN ODT) 4 MG ODT tab Take 1 tablet (4 mg) by mouth every 6 hours as needed.       predniSONE (DELTASONE) 1 MG tablet Take 4 tablets (4 mg) by mouth daily. Take 4mg (1mg tablets x4) and Take with 5mg tablet to equal 9mg daily 90 tablet 0     predniSONE (DELTASONE) 5 MG tablet Take 1 tablet (5 mg) by mouth daily. Take  "with 5mg tablet with 4mg (1mg tablets x4) to equal 9mg daily 30 tablet 0     sertraline (ZOLOFT) 25 MG tablet Take 1 tablet (25 mg) by mouth daily for 7 days. 7 tablet 0     [START ON 5/4/2025] sertraline (ZOLOFT) 50 MG tablet Take 1 tablet (50 mg) by mouth daily. 30 tablet 0     sirolimus (GENERIC EQUIVALENT) 1 MG tablet Take 1 tablet (1 mg) by mouth daily. 90 tablet 3     triamcinolone (KENALOG) 0.1 % external ointment Apply topically 2 times daily. Use 2 weeks or less. 80 g 0     ursodiol (ACTIGALL) 250 MG tablet Take 1 tablet (250 mg) by mouth 2 times daily. 180 tablet 3     glucose 40 % (400 mg/mL) gel Take 15-30 g by mouth every 15 minutes as needed for low blood sugar.       Nutrisource Fiber PO packet Take 1 packet by mouth daily. Benefiber       No current facility-administered medications for this visit.         Immunizations:  Immunization History   Administered Date(s) Administered     COVID-19 12+ (Pfizer) 10/17/2024     COVID-19 MONOVALENT 12+ (Pfizer) 02/21/2021, 03/15/2021, 03/29/2021, 10/09/2021     Flu 65+ (Fluad) 12/21/2019     [id number] Novel Flu P-free 11/10/2009     HEPA 07/01/2001     HepB, Unspecified 07/01/2001     Influenza (High Dose) Trivalent,PF (Fluzone) 09/25/2015, 10/30/2016, 10/23/2017, 10/24/2024     Influenza (IIV3) PF 11/10/2004, 09/29/2007, 10/01/2010, 09/27/2012, 10/29/2012, 09/30/2013, 09/01/2016     Influenza Vaccine 18-64 (Flublok) 10/20/2018     Influenza Vaccine 65+ (Fluzone HD) 11/03/2020, 03/14/2022, 10/20/2022     Influenza Vaccine, 6+MO IM (QUADRIVALENT W/PRESERVATIVES) 10/01/2020     Pneumo Conj 13-V (2010&after) 02/26/2014, 01/01/2016     Pneumococcal 23 valent 12/01/2007, 01/01/2014, 05/25/2016     TD,PF 7+ (Tenivac) 01/01/2007     TDAP (Adacel,Boostrix) 01/01/2014     TDAP Vaccine (Adacel) 09/17/2007, 02/26/2014         Examination:  Vital signs:   /62 (BP Location: Left arm, Patient Position: Sitting)   Pulse 78   Ht 1.702 m (5' 7\")   Wt 82.1 kg (181 lb)   " LMP 06/01/1988 (Approximate)   SpO2 94%   BMI 28.35 kg/m    Constitutional: Patient in no distress, in a wheel chair, deconditioned but able to stand and turn   Eyes: pale, not jaundiced  Abdomen: soft, BS+  Skin: no rash  Right arm picc looks ok   Extremities: +pedal edema  Psych: Alert and oriented x 3    Laboratory:  Hematology:  Recent Labs   Lab Test 04/28/25  1213 04/20/25  0637 04/19/25  0714   WBC 14.2* 8.3 6.0   RBC 3.70* 3.26* 3.22*   HGB 9.9* 8.6* 8.7*   HCT 32.7* 28.5* 29.5*   MCV 88 87 92   MCH 26.8 26.4* 27.0   MCHC 30.3* 30.2* 29.5*   RDW 18.3* 17.5* 17.4*   * 401 383       Chemistry:  Recent Labs   Lab Test 04/23/25  0754 04/23/25  0156 04/22/25  2124 04/20/25  0950 04/20/25  0637 04/19/25  0834 04/19/25  0714 04/18/25  0852 04/18/25  0618   NA  --   --   --   --  143  --  144  --  141   POTASSIUM  --   --   --   --  4.2  --  4.4  --  4.2   CHLORIDE  --   --   --   --  110*  --  111*  --  109*   CO2  --   --   --   --  23  --  24  --  22   ANIONGAP  --   --   --   --  10  --  9  --  10   * 156* 236*   < > 113*   < > 116*   < > 122*   BUN  --   --   --   --  35.6*  --  33.5*  --  35.3*   CR  --   --   --   --  1.33*  --  1.32*  --  1.40*   KEILY  --   --   --   --  8.8  --  8.8  --  8.7*    < > = values in this interval not displayed.       Liver Function Studies:  Recent Labs   Lab Test 04/28/25  1213   PROTTOTAL 6.5   ALBUMIN 3.7   BILITOTAL 0.2   ALKPHOS 91   AST 19   ALT 11         Immune Globulin Studies:  Recent Labs   Lab Test 08/05/19  1552   IGG 1,110             Microbiology:  3/8/25 blood cx enterococcus fecalis    Jan 2025: Blood cxs, AFB blood cx, CMV PCR, EBV PCR, Crypto antigen, Cocci antigen, histo antigen, blasto antigen, fungal abs, BDG, urine cx    9/22/24 blood cx ESBL ,, and Pseudomonas aeruginosa     Urine cxs  4/7/25 50-100k ESBL Klebsiella oxytoca and Proteus vulgaris   3/8/25 >100k Klebsiella pneumoniae  2/7/25 10-50k mixed louann   1/22/25 10-50k  mixed louann   1/19/25 <10k mixed louann   1/10/25 <10k mixed louann  12/10/24 >100k Klebsiella pneumoniae  11/6/24 - no growth   10/11/24 50-100k mixed urogenital louann (before abx was administered but she might have taken oral abxs prior)  9/22/24 >100k ESBL Klebsiella oxytoca  8/20/24 >100k ESBL,,    Imaging:  CT abdomen pelvis 4/16/25  1.  New fat stranding and mesenteric edema about the distal sigmoid  colon in the midline, nonspecific, but may represent diverticulitis.  Suspected contained microperforation with small fluid collection  containing tiny focus of air posterior to the sigmoid colon.  2.  Distended bladder within normal limits.  3.  Unchanged fluid density 1.2 cm lesion in the region of the  pancreatic head.  4.  Stable postsurgical changes of liver transplant and curvilinear  hypodensity in the inferior right lobe.  5.  Other chronic CT findings including small bilateral pleural  effusions and right greater than left basilar calcified opacities are  unchanged.    CT abdomen pelvis 3/8/25  1. Postsurgical changes of liver transplant, subtle curvilinear  hypodensity in the inferior liver may represent peripheral biliary  ductal dilatation or edema, similar to slightly more conspicuous  compared to prior CT scan; if persistent clinical concern for  cholangitis, consider gastroenterology consult and/or MRCP.  2. Fluid density 1.4 cm lesion in the pancreatic head region, may  represent pancreatic cyst/IPMN, consider follow-up as suggested  below..  3. Diverticulosis without definite diverticulitis  4. Additional findings similar to prior CT from 1/22/2025, including  right lung base calcifications and  densities.                        Again, thank you for allowing me to participate in the care of your patient.      Sincerely,    Natividad Lundberg MD

## 2025-04-29 ENCOUNTER — LAB REQUISITION (OUTPATIENT)
Dept: LAB | Facility: CLINIC | Age: 75
End: 2025-04-29
Payer: MEDICARE

## 2025-04-29 ENCOUNTER — TELEPHONE (OUTPATIENT)
Dept: INFECTIOUS DISEASES | Facility: CLINIC | Age: 75
End: 2025-04-29
Payer: MEDICARE

## 2025-04-29 DIAGNOSIS — N39.0 URINARY TRACT INFECTION: ICD-10-CM

## 2025-04-29 DIAGNOSIS — N93.9 ABNORMAL UTERINE AND VAGINAL BLEEDING, UNSPECIFIED: ICD-10-CM

## 2025-04-29 LAB
ALBUMIN UR-MCNC: NEGATIVE MG/DL
APPEARANCE UR: CLEAR
BILIRUB UR QL STRIP: NEGATIVE
COLOR UR AUTO: ABNORMAL
GLUCOSE UR STRIP-MCNC: NEGATIVE MG/DL
HGB UR QL STRIP: NEGATIVE
KETONES UR STRIP-MCNC: NEGATIVE MG/DL
LEUKOCYTE ESTERASE UR QL STRIP: ABNORMAL
MUCOUS THREADS #/AREA URNS LPF: PRESENT /LPF
NITRATE UR QL: NEGATIVE
PH UR STRIP: 5.5 [PH] (ref 5–7)
RBC URINE: <1 /HPF
SP GR UR STRIP: 1.01 (ref 1–1.03)
SQUAMOUS EPITHELIAL: <1 /HPF
UROBILINOGEN UR STRIP-MCNC: NORMAL MG/DL
WBC URINE: 5 /HPF

## 2025-04-29 PROCEDURE — 81003 URINALYSIS AUTO W/O SCOPE: CPT | Performed by: NURSE PRACTITIONER

## 2025-04-29 NOTE — PROGRESS NOTES
[facility] GERIATRICS    Chief Complaint   Patient presents with    RECHECK     HPI:  Luz Thompson is a 75 year old  (1950), who is being seen today for an episodic care visit at: [facility] ([facility]) [[id number]]. Today's concern is: The primary encounter diagnosis was Physical deconditioning. 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, S/P Mohs surgery for basal cell carcinoma, PAF (paroxysmal atrial fibrillation) (H), Hyperlipidemia LDL goal <70, Chronic diastolic heart failure (H), Hypertension goal BP (blood pressure) < 140/80, Stage 3b chronic kidney disease (H), Type 2 diabetes mellitus with stage 3b chronic kidney disease, without long-term current use of insulin (H), RASHIDA (obstructive sleep apnea), Nocturnal hypoxemia, Status post liver transplantation (H), Abnormal liver function tests, Depression, unspecified depression type, Intra-abdominal infection, Immunocompromised, Hx of bacteremia, Hx of sepsis, Seborrheic dermatitis, Papillary thyroid carcinoma (H), Benign essential hypertension, Vaginal bleeding, and Epistaxis were also pertinent to this visit.    Per daughter reports to  on site: Dtr very concerned about mom's ability to live independently. Reportedly the building called the dtr after most recent episode and return to hospital, they informed dtr she needs a higher level of care.She was getting homecare, but routinely cancelling their appts. They observed her not to be taking her meds properly. They would find her PM meds from day before in pill box next day. Dtr was finding feces around the apt and all over herself too.  She has  blow outs  and is unable to  clean them up. Dtr said she would cover them up with a towel and wait until someone would come to visit to clean up.  Water has been left running for hours and dtr is worried about the stove. DTr would find her stuck in bed and she couldn't get out because she had dropped the remote to the bed. Dtr said she has been to hosp 5-6 times since last summer. She is reportedly very defensive about needing AL and dtr said she can't even talk to her about this.    Per NADEEM in regards to home care staff today 4/30/25: I spoke to homecare and they will have someone participate in CC. They have [facility] several [facility] and if [place] were [place] and think [place] can just resume homecare with them, they would not be able to accept as [place].They said.. very cluttered apt.   Concern about med compliance and when they ask more questions and try to look at all of her scripts and pill bottles, she won't let them.    It was reported 2 days ago of staff reporting vaginal bleeding, therefore apixaban was placed on hold for 5 days.     Met with patient who was found lying in bed. She denied any further vaginal bleeding. Despite education and risk with apixaban use with recommendations to hold, she would very much like to restart it today. She reports some mild abdominal cramping but no pain. Known history of endometrial cancer with mother reported. She is open to OBGYN referral for further evaluation needs. She denies any chest pain, palpitations, shortness of breath, [place], lightheadedness, dizziness, or cough. Denies any abdominal discomfort. Denies N&V. Denies dysuria or frequency. Recent UA was negative. Denies loose or constipation. Appetite good. Sleeping well. No complaints of pain reported.     BP Readings from Last 3 Encounters:   04/30/25 138/69   04/28/25 130/62   04/28/25 127/60     Wt Readings from Last 5 Encounters:   04/30/25 82.1 kg (181 lb)   04/28/25 82.1 kg (181 lb)   04/28/25 82.1 kg (181 lb)   04/24/25  "82.1 kg (181 lb)   04/15/25 76.4 kg (168 lb 6.4 oz)     Allergies, and PMH/PSH reviewed in EPIC today.  REVIEW OF SYSTEMS:  4 point ROS including Respiratory, CV, GI and , other than that noted in the HPI,  is negative    Objective:   /69   Pulse 71   Temp 97.9  F (36.6  C)   Resp 18   Ht 1.702 m (5' 7\")   Wt 82.1 kg (181 lb)   LMP 06/01/1988 (Approximate)   SpO2 96%   BMI 28.35 kg/m    GENERAL APPEARANCE:  Alert, in no distress, cooperative  ENT:  Mouth and posterior oropharynx normal, moist mucous membranes, [place]  EYES:  EOM, conjunctivae, lids, pupils and irises normal  NECK:  No adenopathy,masses or thyromegaly  RESP:  respiratory effort and palpation of chest normal, lungs clear to auscultation , no respiratory distress  CV:  regular rate and rhythm, no murmur, rub, or gallop, peripheral edema 1+ in BLE  ABDOMEN:  normal bowel sounds, soft, nontender, no hepatosplenomegaly or other masses, no guarding or rebound  M/S:   Ambulates with walker. Staff to assist for ADLs, transfers and cares  SKIN:  Inspection of skin and subcutaneous tissue baseline  NEURO:   Cranial nerves 2-12 are normal tested and grossly at patient's baseline, no purposeful movement in upper and lower extremities  PSYCH:  oriented X 3, memory impaired , affect and mood normal    Most Recent 3 CBC's:  Recent Labs   Lab Test 04/28/25  1213 04/20/25  0637 04/19/25  0714   WBC 14.2* 8.3 6.0   HGB 9.9* 8.6* 8.7*   MCV 88 87 92   * 401 383     Most Recent 3 BMP's:  Recent Labs   Lab Test 04/28/25  1213 04/23/25  0754 04/23/25  0156 04/20/25  0950 04/20/25  0637 04/19/25  0834 04/19/25  0714     --   --   --  143  --  144   POTASSIUM 4.5  --   --   --  4.2  --  4.4   CHLORIDE 103  --   --   --  110*  --  111*   CO2 20*  --   --   --  23  --  24   BUN 39.9*  --   --   --  35.6*  --  33.5*   CR 1.45*  --   --   --  1.33*  --  1.32*   ANIONGAP 16*  --   --   --  10  --  9   KEILY 9.3  --   --   --  8.8  --  8.8   * 126* " 156*   < > 113*   < > 116*    < > = values in this interval not displayed.     Most Recent 2 LFT's:  Recent Labs   Lab Test 04/28/25  1213 04/20/25  0637   AST 19 27   ALT 11 50   ALKPHOS 91 97   BILITOTAL 0.2 <0.2     7-Day Micro Results       Collected Updated Procedure Result Status      04/24/2025 1005 04/26/2025 1256 Quantiferon TB Gold Plus Grey Tube [[id number]]   Blood from Arm, Left    Final result Component Value Units   Quantiferon Nil Tube 0.07 IU/mL            04/24/2025 1005 04/26/2025 1256 Quantiferon TB Gold Plus Green Tube [[id number]]   Blood from Arm, Left    Final result Component Value Units   Quantiferon TB1 Tube 0.08 IU/mL            04/24/2025 1005 04/26/2025 1256 Quantiferon TB Gold Plus Yellow Tube [[id number]]   Blood from Arm, Left    Final result Component Value   Quantiferon TB2 Tube 0.09            04/24/2025 1005 04/26/2025 1255 Quantiferon TB Gold Plus Purple Tube [[id number]]   Blood from Arm, Left    Final result Component Value Units   Quantiferon Mitogen 10.00 IU/mL            04/24/2025 1005 04/26/2025 1750 Quantiferon TB Gold Plus [[id number]]   Blood from Arm, Left    Final result Component Value Units   Quantiferon-TB Gold Plus Negative    No interferon gamma response to M.tuberculosis antigens was detected. Infection with M.tuberculosis is unlikely, however a single negative result does not exclude infection. In patients at high risk for infection, a second test should be considered in accordance with the 2017 ATS/IDSA/CDC Clinical Pract  ice Guidelines for Diagnosis of Tuberculosis in Adults and Children    TB1 Ag minus Nil Value 0.01 IU/mL   TB2 Ag minus Nil Value 0.02 IU/mL   Mitogen minus Nil Result 9.93 IU/mL   Nil Result 0.07 IU/mL                  Most Recent Hemoglobin A1c:  Recent Labs   Lab Test 04/14/25  1701   A1C 6.8*     Most Recent Urinalysis:  Recent Labs   Lab Test 04/29/25  0950 04/07/25  0945 04/07/25  0935   COLOR Straw   < > Light Yellow    APPEARANCE Clear   < > Slightly Cloudy*   URINEGLC Negative   < > Negative   URINEBILI Negative   < > Negative   URINEKETONE Negative   < > Negative   SG 1.008   < > 1.007   UBLD Negative   < > Trace*   URINEPH 5.5   < > 5.5   PROTEIN Negative   < > 10*   UROBILINOGEN  --   --  0.2   NITRITE Negative   < > Negative   LEUKEST Trace*   < > Large*   RBCU <1   < > 2-5*   WBCU 5   < > >100*    < > = values in this interval not displayed.     Most Recent Anemia Panel:  Recent Labs   Lab Test 04/28/25  1213 04/14/25  1410 04/07/25  0945 09/04/24  1008 06/15/23  1028   WBC 14.2*   < > 6.5   < > 7.6   HGB 9.9*   < > 9.1*   < > 12.1   HCT 32.7*   < > 30.0*   < > 38.4   MCV 88   < > 88   < > 92   *   < > 351   < > 345   IRON  --   --  36*   < >  --    IRONSAT  --   --  13*   < >  --    FEB  --   --  277   < >  --    LAURA  --   --  44   < >  --    B12  --   --   --   --  456    < > = values in this interval not displayed.     Magnesium   Date Value Ref Range Status   03/09/2025 4.0 (H) 1.7 - 2.3 mg/dL Final   08/05/2019 2.2 1.6 - 2.3 mg/dL Final     Vitamin D Deficiency Screening Results:  Lab Results   Component Value Date    VITDT 30 04/04/2025    VITDT 15 (L) 12/22/2012    VITDT 27 (L) 09/27/2012     ASSESSMENT/PLAN:  sepsis 2/2 intraabdominal infection - resolved  Recurrent UTIs on IV ertapenem since 3/9/2024  Recent Hx of E. Faecalis bacteremia  Sigmoid microperforation  Comment: Admitted for generalized weakness likely 2/2 infection. Found to have intraabdominal infection secondary to a sigmoid microperforation. Recent admission in March 2025 for Klebsiella pneumonia UTI and E. Faecalis bacteremia with recommendations from ID for ertapenem 1g q24h until EOT 4/22. RVP negative.  Plan:  -Follow up with ID as directed. Scheduled for Monday 4/28/25  -Abdominal CT scan scheduled as directed for 5/5/25 at 1220pm  -Follow up with urology as directed. Scheduled for 6/10/25  -Continue vaginal estradiol MWF at HS  -Monitor  for worsening s/symptoms of concerns  -Continue IV ertapenem 1gm daily as directed.Estimated until 5/8/25. Pending CT scan results for further ID advisement  -CMP, CBC with diff and CRP due weekly on Mondays until 5/12/25. Fax results to [phone] AUSTYN Del Rio     Liver transplant in 2002 2/2 primary biliary cirrhosis  Comment: Chronic. Follows Dr. Ramirez.   Plan:  -Continue PTA sirolimus 1 mg every day  -Reduced prednisone down to 9mg daily for now. (5mg with 4 tablets of 1mg=9mg total)  -Continue PTA ursodiol 250 mg BID  -Follow up with transplant team as directed  -CMP, CBC with diff and CRP due weekly on Mondays until 5/12/25. Fax results to [phone] AUSTYN Del Rio     Depression   Comment: Chronic. Reports feeling more down and depressed with little interest in doing things. Has a therapist in outpatient setting and has been on anti-depressants historically.   Plan:  -Monitor mood and behaviors  -Monitor for worsening s/symptoms of concerns  -Monitor for changes in mobility, eating and sleeping patterns  -Continue Zoloft 25 mg x 7 days, 50 mg every day there after--50mg dose is projected to start 5/1  -Follow up with psych post TCU  -CMP, CBC with diff and CRP due weekly on Mondays until 5/12/25. Fax results to [phone] AUSTYN Del Rio     Mild Obstructive Sleep Apnea   Nocturnal Hypoxemia   Comment: Chronic. Sleep study in 2018 showing mild RASHIDA with recommendation to start CPAP. IT does not appear that there was further follow up and not using CPAP.  Plan:  -Monitor sleeping patterns  -Monitor respiratory status  -PCP to consider sleep medicine referral  -CMP, CBC with diff and CRP due weekly on Mondays until 5/12/25. Fax results to [phone] AUSTYN Del Rio     CKD3b w/ moderate proteinuria, at baseline  HTN  CVD/HLD  paroxysmal atrial fibrillatin  Comment: Chronic. Baseline Cr. 1.5. History of dialysis at time of liver transplant 23 years ago. Recurrent AKIs and  immunosuppressive medication toxicity. Not on SGLT2i 2/2 recurrent UTI. Chart review w/ unclear heart failure history, unconfirmed with EF 60-65% on 3/10/25. BNP 1446 elevated from 1 month ago. Given unclear HF history, trace bilateral LE edema, with some pulm edema noted on imaging, consideration for volume overload. However, without orthopnea or clear cough. Will defer diuresis.  Plan:  -Continue PTA amlodipine 2.5mg every day. HOLD if SBP<100  -Continue Meclizine 25mg TID PRN  -Continue PTA hydralazine 50mg TID. Hold for SBP <100mmHg.   -Continue PTA metoprolol succinate 25mg every day. HOLD if SBP<100 and/or HR<55  -Monitor BP and HR  -HOLD PTA evolocumab q2week. Follow up with PCP post TCU to resume. Resume off while on ANTIBIOTIC course-due 5/8 or later  -Continue PTA eztimibe 10mg every day  -HELD apixaban for the last 2 days, however patient insists on restarting this tonight. Will restart 5mg BID. Risks and education provided today.   -CMP, CBC with diff and CRP due weekly on Mondays until 5/12/25. Fax results to [phone] ATTN Sindhu Del Rio     T2DM  (A1c 6.8% 4/14/25)  Comment: Chronic. Last A1c 6.8% on 4/14/25.   Plan:  -Continue PTA linagliptin 5mg every day  -Continue PTA gabapentin 300mg at bedtime  -Blood Glucose monitoring back to previous schedule of BID. HOLD off on using freestyle phil 2 sensor while on TCU  -CMP, CBC with diff and CRP due weekly on Mondays until 5/12/25. 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 Wound does not look infected.  Plan:  -Monitor for worsening s/symptoms of concerns  -Was scheduled for dermatology follow up appointment on 4/22, will need to be rescheduled. Discussed with Derm resident on call 4/21 who saw patient at bedside and discussed with her MOHS surgeon.      anemia,  chronic  Comment: Chronic. Hgb 9.9 on admit. Stable.  Plan:  -Monitor bleeding risks  -Continue PTA ferrous sulfate daily  -Continue folic acid daily  -HELD apixaban for the last 2 days, however patient insists on restarting this tonight. Will restart 5mg BID. Risks and education provided today.   -CMP, CBC with diff and CRP due weekly on Mondays until 5/12/25. Fax results to [phone] AUSTYN Del Rio     Hypothyroidism  Papillary thyroid carcinoma  Chronic. Recent TSH~1.72 on 4/4/25  Plan;  -Continue PTA levothyroxine 175mcg qD   -Monitor for worsening s/symptoms of concerns  -CMP, CBC with diff and CRP due weekly on Mondays until 5/12/25. Fax results to [phone] AUSTYN Del Rio     Chronic Shoulder Pain  Osteoarthritis   Lower back pain  Comment: Chronic. stable  Plan:  -Monitor pain complaints  -Continue diclofenac gel application to painful areas QID  -Continue Tylenol 1000mg BID   -Continue gabapentin 300mg at HS--only able to tolerate liquid given gelatin capsule allergy  -CMP, CBC with diff and CRP due weekly on Mondays until 5/12/25. Fax results to [phone] AUSTYN Del Rio     Constipation   Comment: Chronic. S/T polypharmacy  Plan:  -Monitor BM patterns  -Continue beneFiber qd    -Zofran PRN  -CMP, CBC with diff and CRP due weekly on Mondays until 5/12/25. Fax results to [phone] AUSTYN Del Rio    Epistaxis  Comment: Acute on chronic. She reports occasional nose bleed at times. No episodes while on TCU. She reports history of good relief with pressure and ice application  Plan:  -Monitor bleeding risks  -Monitor for worsening s/symptoms of concerns  -Afrin BID PRN on TCU if warranted    Vaginal bleeding  Comment: Acute on chronic. Per staff on 4/28:  WERO reported that pt underwear had a lot of blood on it. Writer with charge nurse went in to check. There was bright red blood on the pt's underwear. Pt stated that she has no idea why there is blood. Some blood clot  noted as well. Pt stated that she feels some discomfort on her lower left abdomen but denied pain elsewhere. Writer rechecked to see if there was more blood after about 2 hours and there was none. Np updated, new order to collect UA/UC. Last menstrual period she reports has been 40 years ago or so. Known family history of endometrial cancer that metastasized to bowel per her reports. She reports her mother had extensive genetic testing for review in EPIC. Reports mother name: Anne Yap  3/9/23 for reference if indicated.  Plan:  -urine was negative for concerns or evidence of blood  -HELD apixaban for the last 2 days, however patient insists on restarting this tonight. Will restart 5mg BID. Risks and education provided today.   -CMP, CBC with diff and CRP due weekly on  until 25. Fax results to [phone] ATTN Sindhu Del Rio  -Urgent referral for OBGYN referral in place for further testing needs    Physical deconditioning  Generalized muscle weakness  Comment: Acute on chronic. Known poor history PTA. Multiple [facility] reports known to [facility] living given concerns. Per therapy-Ambulates up to 150ft with upwalker. SBA for most needs and LB cares.   Plan:  -Continue Physical therapy and Occupational therapy  -Recommend cognitive testing on site  -highly recommend penitentiary compliance post TCU     Electronically signed by:  Dr. Manda Flor DNP, APRN, FNP-C, WCS-C, EDS-C     Provider reviewed records from facility, and interpreted most recent imaging/lab work, and vital signs.   Acute and chronic conditions managed by writer. Have been reviewed during today's exam.

## 2025-04-30 ENCOUNTER — TRANSITIONAL CARE UNIT VISIT (OUTPATIENT)
Dept: GERIATRICS | Facility: CLINIC | Age: 75
End: 2025-04-30
Payer: MEDICARE

## 2025-04-30 VITALS
HEIGHT: 67 IN | OXYGEN SATURATION: 96 % | DIASTOLIC BLOOD PRESSURE: 69 MMHG | TEMPERATURE: 97.9 F | BODY MASS INDEX: 28.41 KG/M2 | RESPIRATION RATE: 18 BRPM | WEIGHT: 181 LBS | HEART RATE: 71 BPM | SYSTOLIC BLOOD PRESSURE: 138 MMHG

## 2025-04-30 DIAGNOSIS — Z87.898 HX OF BACTEREMIA: ICD-10-CM

## 2025-04-30 DIAGNOSIS — F32.A DEPRESSION, UNSPECIFIED DEPRESSION TYPE: ICD-10-CM

## 2025-04-30 DIAGNOSIS — G47.34 NOCTURNAL HYPOXEMIA: ICD-10-CM

## 2025-04-30 DIAGNOSIS — I50.32 CHRONIC DIASTOLIC HEART FAILURE (H): ICD-10-CM

## 2025-04-30 DIAGNOSIS — E11.22 TYPE 2 DIABETES MELLITUS WITH STAGE 3B CHRONIC KIDNEY DISEASE, WITHOUT LONG-TERM CURRENT USE OF INSULIN (H): ICD-10-CM

## 2025-04-30 DIAGNOSIS — I48.0 PAF (PAROXYSMAL ATRIAL FIBRILLATION) (H): ICD-10-CM

## 2025-04-30 DIAGNOSIS — Z86.19 HX OF SEPSIS: ICD-10-CM

## 2025-04-30 DIAGNOSIS — C44.329 SQUAMOUS CELL CANCER OF SKIN OF LEFT CHEEK: ICD-10-CM

## 2025-04-30 DIAGNOSIS — I10 HYPERTENSION GOAL BP (BLOOD PRESSURE) < 140/80: ICD-10-CM

## 2025-04-30 DIAGNOSIS — M25.519 CHRONIC SHOULDER PAIN, UNSPECIFIED LATERALITY: ICD-10-CM

## 2025-04-30 DIAGNOSIS — N18.32 STAGE 3B CHRONIC KIDNEY DISEASE (H): ICD-10-CM

## 2025-04-30 DIAGNOSIS — N93.9 VAGINAL BLEEDING: ICD-10-CM

## 2025-04-30 DIAGNOSIS — M81.0 AGE-RELATED OSTEOPOROSIS WITHOUT CURRENT PATHOLOGICAL FRACTURE: ICD-10-CM

## 2025-04-30 DIAGNOSIS — D64.9 CHRONIC ANEMIA: ICD-10-CM

## 2025-04-30 DIAGNOSIS — R53.81 PHYSICAL DECONDITIONING: Primary | ICD-10-CM

## 2025-04-30 DIAGNOSIS — I10 BENIGN ESSENTIAL HYPERTENSION: ICD-10-CM

## 2025-04-30 DIAGNOSIS — E78.5 HYPERLIPIDEMIA LDL GOAL <70: ICD-10-CM

## 2025-04-30 DIAGNOSIS — C73 PAPILLARY THYROID CARCINOMA (H): ICD-10-CM

## 2025-04-30 DIAGNOSIS — N18.32 TYPE 2 DIABETES MELLITUS WITH STAGE 3B CHRONIC KIDNEY DISEASE, WITHOUT LONG-TERM CURRENT USE OF INSULIN (H): ICD-10-CM

## 2025-04-30 DIAGNOSIS — K59.01 SLOW TRANSIT CONSTIPATION: ICD-10-CM

## 2025-04-30 DIAGNOSIS — L21.9 SEBORRHEIC DERMATITIS: ICD-10-CM

## 2025-04-30 DIAGNOSIS — M54.50 BILATERAL LOW BACK PAIN WITHOUT SCIATICA, UNSPECIFIED CHRONICITY: ICD-10-CM

## 2025-04-30 DIAGNOSIS — R79.89 ABNORMAL LIVER FUNCTION TESTS: ICD-10-CM

## 2025-04-30 DIAGNOSIS — Z98.890 S/P MOHS SURGERY FOR BASAL CELL CARCINOMA: ICD-10-CM

## 2025-04-30 DIAGNOSIS — G89.29 CHRONIC SHOULDER PAIN, UNSPECIFIED LATERALITY: ICD-10-CM

## 2025-04-30 DIAGNOSIS — Z94.4 STATUS POST LIVER TRANSPLANTATION (H): ICD-10-CM

## 2025-04-30 DIAGNOSIS — R04.0 EPISTAXIS: ICD-10-CM

## 2025-04-30 DIAGNOSIS — M62.81 GENERALIZED MUSCLE WEAKNESS: ICD-10-CM

## 2025-04-30 DIAGNOSIS — Z85.828 S/P MOHS SURGERY FOR BASAL CELL CARCINOMA: ICD-10-CM

## 2025-04-30 DIAGNOSIS — E03.9 HYPOTHYROIDISM, UNSPECIFIED TYPE: ICD-10-CM

## 2025-04-30 DIAGNOSIS — B99.9 INTRA-ABDOMINAL INFECTION: ICD-10-CM

## 2025-04-30 DIAGNOSIS — G47.33 OSA (OBSTRUCTIVE SLEEP APNEA): ICD-10-CM

## 2025-04-30 DIAGNOSIS — D84.9 IMMUNOCOMPROMISED: ICD-10-CM

## 2025-04-30 RX ORDER — OXYMETAZOLINE HYDROCHLORIDE 0.05 G/100ML
2 SPRAY NASAL 2 TIMES DAILY PRN
Qty: 30 ML | Refills: 0 | Status: SHIPPED | OUTPATIENT
Start: 2025-04-30

## 2025-04-30 RX ORDER — HYDRALAZINE HYDROCHLORIDE 50 MG/1
50 TABLET, FILM COATED ORAL 3 TIMES DAILY
Status: SHIPPED
Start: 2025-04-30

## 2025-04-30 NOTE — LETTER
4/30/2025      Luz Thompson  [address]  [facility] [address]        [facility] GERIATRICS    Chief Complaint   Patient presents with     RECHECK     HPI:  Luz Thompson is a 75 year old  (1950), who is being seen today for an episodic care visit at: [facility] ([facility])([facility]) [[id number]]. Today's concern is: The primary encounter diagnosis was Physical deconditioning. 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, S/P Mohs surgery for basal cell carcinoma, PAF (paroxysmal atrial fibrillation) (H), Hyperlipidemia LDL goal <70, Chronic diastolic heart failure (H), Hypertension goal BP (blood pressure) < 140/80, Stage 3b chronic kidney disease (H), Type 2 diabetes mellitus with stage 3b chronic kidney disease, without long-term current use of insulin (H), RASHIDA (obstructive sleep apnea), Nocturnal hypoxemia, Status post liver transplantation (H), Abnormal liver function tests, Depression, unspecified depression type, Intra-abdominal infection, Immunocompromised, Hx of bacteremia, Hx of sepsis, Seborrheic dermatitis, Papillary thyroid carcinoma (H), Benign essential hypertension, Vaginal bleeding, and Epistaxis were also pertinent to this visit.    Per daughter reports to  on site: Dtr very concerned about mom's ability to live independently. Reportedly the building called the dtr after most recent episode and return to hospital, they informed dtr she needs a higher level of care.She was getting homecare, but routinely cancelling their appts. They observed her not to be taking her meds properly. They would find her PM meds from day before in pill box next day. Dtr was  finding feces around the apt and all over herself too.  She has  blow outs  and is unable to clean them up. Dtr said she would cover them up with a towel and wait until someone would come to visit to clean up.  Water has been left running for hours and dtr is worried about the stove. DTr would find her stuck in bed and she couldn't get out because she had dropped the remote to the bed. Dtr said she has been to hosp 5-6 times since last summer. She is reportedly very defensive about needing AL and dtr said she can't even talk to her about this.    Per NADEEM in regards to home care staff today 4/30/25: I spoke to homecare and they will have someone participate in CC. They have made several [facility] and if [place] were [place] and think [place] can just resume homecare with them, they would not be able to accept as [place].They said.. very cluttered apt.   Concern about med compliance and when they ask more questions and try to look at all of her scripts and pill bottles, she won't let them.    It was reported 2 days ago of staff reporting vaginal bleeding, therefore apixaban was placed on hold for 5 days.     Met with patient who was found lying in bed. She denied any further vaginal bleeding. Despite education and risk with apixaban use with recommendations to hold, she would very much like to restart it today. She reports some mild abdominal cramping but no pain. Known history of endometrial cancer with mother reported. She is open to OBGYN referral for further evaluation needs. She denies any chest pain, palpitations, shortness of breath, [place], lightheadedness, dizziness, or cough. Denies any abdominal discomfort. Denies N&V. Denies dysuria or frequency. Recent UA was negative. Denies loose or constipation. Appetite good. Sleeping well. No complaints of pain reported.     BP Readings from Last 3 Encounters:   04/30/25 138/69   04/28/25 130/62   04/28/25 127/60     Wt Readings from Last 5 Encounters:  "  04/30/25 82.1 kg (181 lb)   04/28/25 82.1 kg (181 lb)   04/28/25 82.1 kg (181 lb)   04/24/25 82.1 kg (181 lb)   04/15/25 76.4 kg (168 lb 6.4 oz)     Allergies, and PMH/PSH reviewed in EPIC today.  REVIEW OF SYSTEMS:  4 point ROS including Respiratory, CV, GI and , other than that noted in the HPI,  is negative    Objective:   /69   Pulse 71   Temp 97.9  F (36.6  C)   Resp 18   Ht 1.702 m (5' 7\")   Wt 82.1 kg (181 lb)   LMP 06/01/1988 (Approximate)   SpO2 96%   BMI 28.35 kg/m    GENERAL APPEARANCE:  Alert, in no distress, cooperative  ENT:  Mouth and posterior oropharynx normal, moist mucous membranes, [place]  EYES:  EOM, conjunctivae, lids, pupils and irises normal  NECK:  No adenopathy,masses or thyromegaly  RESP:  respiratory effort and palpation of chest normal, lungs clear to auscultation , no respiratory distress  CV:  regular rate and rhythm, no murmur, rub, or gallop, peripheral edema 1+ in BLE  ABDOMEN:  normal bowel sounds, soft, nontender, no hepatosplenomegaly or other masses, no guarding or rebound  M/S:   Ambulates with walker. Staff to assist for ADLs, transfers and cares  SKIN:  Inspection of skin and subcutaneous tissue baseline  NEURO:   Cranial nerves 2-12 are normal tested and grossly at patient's baseline, no purposeful movement in upper and lower extremities  PSYCH:  oriented X 3, memory impaired , affect and mood normal    Most Recent 3 CBC's:  Recent Labs   Lab Test 04/28/25  1213 04/20/25  0637 04/19/25  0714   WBC 14.2* 8.3 6.0   HGB 9.9* 8.6* 8.7*   MCV 88 87 92   * 401 383     Most Recent 3 BMP's:  Recent Labs   Lab Test 04/28/25  1213 04/23/25  0754 04/23/25  0156 04/20/25  0950 04/20/25  0637 04/19/25  0834 04/19/25  0714     --   --   --  143  --  144   POTASSIUM 4.5  --   --   --  4.2  --  4.4   CHLORIDE 103  --   --   --  110*  --  111*   CO2 20*  --   --   --  23  --  24   BUN 39.9*  --   --   --  35.6*  --  33.5*   CR 1.45*  --   --   --  1.33*  --  1.32* "   ANIONGAP 16*  --   --   --  10  --  9   KEILY 9.3  --   --   --  8.8  --  8.8   * 126* 156*   < > 113*   < > 116*    < > = values in this interval not displayed.     Most Recent 2 LFT's:  Recent Labs   Lab Test 04/28/25  1213 04/20/25  0637   AST 19 27   ALT 11 50   ALKPHOS 91 97   BILITOTAL 0.2 <0.2     7-Day Micro Results       Collected Updated Procedure Result Status      04/24/2025 1005 04/26/2025 1256 Quantiferon TB Gold Plus Grey Tube [[id number]]   Blood from Arm, Left    Final result Component Value Units   Quantiferon Nil Tube 0.07 IU/mL            04/24/2025 1005 04/26/2025 1256 Quantiferon TB Gold Plus Green Tube [[id number]]   Blood from Arm, Left    Final result Component Value Units   Quantiferon TB1 Tube 0.08 IU/mL            04/24/2025 1005 04/26/2025 1256 Quantiferon TB Gold Plus Yellow Tube [[id number]]   Blood from Arm, Left    Final result Component Value   Quantiferon TB2 Tube 0.09            04/24/2025 1005 04/26/2025 1255 Quantiferon TB Gold Plus Purple Tube [[id number]]   Blood from Arm, Left    Final result Component Value Units   Quantiferon Mitogen 10.00 IU/mL            04/24/2025 1005 04/26/2025 1750 Quantiferon TB Gold Plus [[id number]]   Blood from Arm, Left    Final result Component Value Units   Quantiferon-TB Gold Plus Negative    No interferon gamma response to M.tuberculosis antigens was detected. Infection with M.tuberculosis is unlikely, however a single negative result does not exclude infection. In patients at high risk for infection, a second test should be considered in accordance with the 2017 ATS/IDSA/CDC Clinical Pract  ice Guidelines for Diagnosis of Tuberculosis in Adults and Children    TB1 Ag minus Nil Value 0.01 IU/mL   TB2 Ag minus Nil Value 0.02 IU/mL   Mitogen minus Nil Result 9.93 IU/mL   Nil Result 0.07 IU/mL                  Most Recent Hemoglobin A1c:  Recent Labs   Lab Test 04/14/25  1701   A1C 6.8*     Most Recent Urinalysis:  Recent Labs    Lab Test 04/29/25  0950 04/07/25  0945 04/07/25  0935   COLOR Straw   < > Light Yellow   APPEARANCE Clear   < > Slightly Cloudy*   URINEGLC Negative   < > Negative   URINEBILI Negative   < > Negative   URINEKETONE Negative   < > Negative   SG 1.008   < > 1.007   UBLD Negative   < > Trace*   URINEPH 5.5   < > 5.5   PROTEIN Negative   < > 10*   UROBILINOGEN  --   --  0.2   NITRITE Negative   < > Negative   LEUKEST Trace*   < > Large*   RBCU <1   < > 2-5*   WBCU 5   < > >100*    < > = values in this interval not displayed.     Most Recent Anemia Panel:  Recent Labs   Lab Test 04/28/25  1213 04/14/25  1410 04/07/25  0945 09/04/24  1008 06/15/23  1028   WBC 14.2*   < > 6.5   < > 7.6   HGB 9.9*   < > 9.1*   < > 12.1   HCT 32.7*   < > 30.0*   < > 38.4   MCV 88   < > 88   < > 92   *   < > 351   < > 345   IRON  --   --  36*   < >  --    IRONSAT  --   --  13*   < >  --    FEB  --   --  277   < >  --    LAURA  --   --  44   < >  --    B12  --   --   --   --  456    < > = values in this interval not displayed.     Magnesium   Date Value Ref Range Status   03/09/2025 4.0 (H) 1.7 - 2.3 mg/dL Final   08/05/2019 2.2 1.6 - 2.3 mg/dL Final     Vitamin D Deficiency Screening Results:  Lab Results   Component Value Date    VITDT 30 04/04/2025    VITDT 15 (L) 12/22/2012    VITDT 27 (L) 09/27/2012     ASSESSMENT/PLAN:  sepsis 2/2 intraabdominal infection - resolved  Recurrent UTIs on IV ertapenem since 3/9/2024  Recent Hx of E. Faecalis bacteremia  Sigmoid microperforation  Comment: Admitted for generalized weakness likely 2/2 infection. Found to have intraabdominal infection secondary to a sigmoid microperforation. Recent admission in March 2025 for Klebsiella pneumonia UTI and E. Faecalis bacteremia with recommendations from ID for ertapenem 1g q24h until EOT 4/22. RVP negative.  Plan:  -Follow up with ID as directed. Scheduled for Monday 4/28/25  -Abdominal CT scan scheduled as directed for 5/5/25 at 1220pm  -Follow up with  urology as directed. Scheduled for 6/10/25  -Continue vaginal estradiol MWF at HS  -Monitor for worsening s/symptoms of concerns  -Continue IV ertapenem 1gm daily as directed.Estimated until 5/8/25. Pending CT scan results for further ID advisement  -CMP, CBC with diff and CRP due weekly on Mondays until 5/12/25. Fax results to [phone] AUSTYN Del [place]     Liver transplant in 2002 2/2 primary biliary cirrhosis  Comment: Chronic. Follows Dr. Ramirez.   Plan:  -Continue PTA sirolimus 1 mg every day  -Reduced prednisone down to 9mg daily for now. (5mg with 4 tablets of 1mg=9mg total)  -Continue PTA ursodiol 250 mg BID  -Follow up with transplant team as directed  -CMP, CBC with diff and CRP due weekly on Mondays until 5/12/25. Fax results to [phone] AUSTYN Del Rio     Depression   Comment: Chronic. Reports feeling more down and depressed with little interest in doing things. Has a therapist in outpatient setting and has been on anti-depressants historically.   Plan:  -Monitor mood and behaviors  -Monitor for worsening s/symptoms of concerns  -Monitor for changes in mobility, eating and sleeping patterns  -Continue Zoloft 25 mg x 7 days, 50 mg every day there after--50mg dose is projected to start 5/1  -Follow up with psych post TCU  -CMP, CBC with diff and CRP due weekly on Mondays until 5/12/25. Fax results to [phone] AUSTYN Del Rio     Mild Obstructive Sleep Apnea   Nocturnal Hypoxemia   Comment: Chronic. Sleep study in 2018 showing mild RASHIDA with recommendation to start CPAP. IT does not appear that there was further follow up and not using CPAP.  Plan:  -Monitor sleeping patterns  -Monitor respiratory status  -PCP to consider sleep medicine referral  -CMP, CBC with diff and CRP due weekly on Mondays until 5/12/25. Fax results to [phone] AUSTYN Del Rio     CKD3b w/ moderate proteinuria, at baseline  HTN  CVD/HLD  paroxysmal atrial fibrillatin  Comment: Chronic. Baseline  Cr. 1.5. History of dialysis at time of liver transplant 23 years ago. Recurrent AKIs and immunosuppressive medication toxicity. Not on SGLT2i 2/2 recurrent UTI. Chart review w/ unclear heart failure history, unconfirmed with EF 60-65% on 3/10/25. BNP 1446 elevated from 1 month ago. Given unclear HF history, trace bilateral LE edema, with some pulm edema noted on imaging, consideration for volume overload. However, without orthopnea or clear cough. Will defer diuresis.  Plan:  -Continue PTA amlodipine 2.5mg every day. HOLD if SBP<100  -Continue Meclizine 25mg TID PRN  -Continue PTA hydralazine 50mg TID. Hold for SBP <100mmHg.   -Continue PTA metoprolol succinate 25mg every day. HOLD if SBP<100 and/or HR<55  -Monitor BP and HR  -HOLD PTA evolocumab q2week. Follow up with PCP post TCU to resume. Resume off while on ANTIBIOTIC course-due 5/8 or later  -Continue PTA eztimibe 10mg every day  -HELD apixaban for the last 2 days, however patient insists on restarting this tonight. Will restart 5mg BID. Risks and education provided today.   -CMP, CBC with diff and CRP due weekly on Mondays until 5/12/25. Fax results to [phone] ATTDENNIS [place]     T2DM  (A1c 6.8% 4/14/25)  Comment: Chronic. Last A1c 6.8% on 4/14/25.   Plan:  -Continue PTA linagliptin 5mg every day  -Continue PTA gabapentin 300mg at bedtime  -Blood Glucose monitoring back to previous schedule of BID. HOLD off on using freestyle phil 2 sensor while on TCU  -CMP, CBC with diff and CRP due weekly on Mondays until 5/12/25. 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 Wound does not look infected.  Plan:  -Monitor for worsening s/symptoms of concerns  -Was scheduled for dermatology follow up appointment on 4/22, will need to be rescheduled. Discussed with Derm resident on call  4/21 who saw patient at bedside and discussed with her MOHS surgeon.      anemia, chronic  Comment: Chronic. Hgb 9.9 on admit. Stable.  Plan:  -Monitor bleeding risks  -Continue PTA ferrous sulfate daily  -Continue folic acid daily  -HELD apixaban for the last 2 days, however patient insists on restarting this tonight. Will restart 5mg BID. Risks and education provided today.   -CMP, CBC with diff and CRP due weekly on Mondays until 5/12/25. Fax results to [phone] AUSTYN Del Rio     Hypothyroidism  Papillary thyroid carcinoma  Chronic. Recent TSH~1.72 on 4/4/25  Plan;  -Continue PTA levothyroxine 175mcg qD   -Monitor for worsening s/symptoms of concerns  -CMP, CBC with diff and CRP due weekly on Mondays until 5/12/25. Fax results to [phone] AUSTYN Del Rio     Chronic Shoulder Pain  Osteoarthritis   Lower back pain  Comment: Chronic. stable  Plan:  -Monitor pain complaints  -Continue diclofenac gel application to painful areas QID  -Continue Tylenol 1000mg BID   -Continue gabapentin 300mg at HS--only able to tolerate liquid given gelatin capsule allergy  -CMP, CBC with diff and CRP due weekly on Mondays until 5/12/25. Fax results to [phone] AUSTYN Del Rio     Constipation   Comment: Chronic. S/T polypharmacy  Plan:  -Monitor BM patterns  -Continue beneFiber qd    -Zofran PRN  -CMP, CBC with diff and CRP due weekly on Mondays until 5/12/25. Fax results to [phone] AUSTYN Del Rio    Epistaxis  Comment: Acute on chronic. She reports occasional nose bleed at times. No episodes while on TCU. She reports history of good relief with pressure and ice application  Plan:  -Monitor bleeding risks  -Monitor for worsening s/symptoms of concerns  -Afrin BID PRN on TCU if warranted    Vaginal bleeding  Comment: Acute on chronic. Per staff on 4/28:  WERO reported that pt underwear had a lot of blood on it. Writer with charge nurse went in to check. There was bright red blood on the  pt's underwear. Pt stated that she has no idea why there is blood. Some blood clot noted as well. Pt stated that she feels some discomfort on her lower left abdomen but denied pain elsewhere. Writer rechecked to see if there was more blood after about 2 hours and there was none. Np updated, new order to collect UA/UC. Last menstrual period she reports has been 40 years ago or so. Known family history of endometrial cancer that metastasized to bowel per her reports. She reports her mother had extensive genetic testing for review in EPIC. Reports mother name: Anne Yap  3/9/23 for reference if indicated.  Plan:  -urine was negative for concerns or evidence of blood  -HELD apixaban for the last 2 days, however patient insists on restarting this tonight. Will restart 5mg BID. Risks and education provided today.   -CMP, CBC with diff and CRP due weekly on  until 25. Fax results to [phone] ATTN Sindhu Del Rio  -Urgent referral for OBGYN referral in place for further testing needs    Physical deconditioning  Generalized muscle weakness  Comment: Acute on chronic. Known poor history PTA. Multiple [facility] reports known to [facility] living given concerns. Per therapy-Ambulates up to 150ft with upwalker. SBA for most needs and LB cares.   Plan:  -Continue Physical therapy and Occupational therapy  -Recommend cognitive testing on site  -highly recommend MIKEY compliance post TCU     Electronically signed by:  Dr. Manda Flor DNP, APRN, FNP-C, WCS-C, EDS-C     Provider reviewed records from facility, and interpreted most recent imaging/lab work, and vital signs.   Acute and chronic conditions managed by writer. Have been reviewed during today's exam.         Sincerely,        Manda Flor, LIZ CNP    Electronically signed

## 2025-05-01 DIAGNOSIS — I48.0 PAROXYSMAL ATRIAL FIBRILLATION (H): Primary | ICD-10-CM

## 2025-05-02 ENCOUNTER — LAB REQUISITION (OUTPATIENT)
Dept: LAB | Facility: CLINIC | Age: 75
End: 2025-05-02
Payer: MEDICARE

## 2025-05-02 DIAGNOSIS — N18.30 CHRONIC KIDNEY DISEASE, STAGE 3 UNSPECIFIED (H): ICD-10-CM

## 2025-05-02 DIAGNOSIS — I10 ESSENTIAL (PRIMARY) HYPERTENSION: ICD-10-CM

## 2025-05-02 DIAGNOSIS — D64.9 ANEMIA, UNSPECIFIED: ICD-10-CM

## 2025-05-02 DIAGNOSIS — A04.9 BACTERIAL INTESTINAL INFECTION, UNSPECIFIED: ICD-10-CM

## 2025-05-02 DIAGNOSIS — K65.1 PERITONEAL ABSCESS (H): ICD-10-CM

## 2025-05-03 NOTE — PROGRESS NOTES
[facility] GERIATRICS    Chief Complaint   Patient presents with    RECHECK     HPI:  Luz Thompson is a 75 year old  (1950), who is being seen today for an episodic care visit at: [facility] ([facility])([facility]) [[id number]]. Today's concern is: The primary encounter diagnosis was Physical deconditioning. Diagnoses of Generalized muscle weakness, Slow transit constipation, Age-related osteoporosis without current pathological fracture, Chronic shoulder pain, unspecified laterality, Bilateral low back pain without sciatica, unspecified chronicity, Chronic anemia, Hypothyroidism, unspecified type, 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, S/P Mohs surgery for basal cell carcinoma, Hyperlipidemia LDL goal <70, Depression, unspecified depression type, Type 2 diabetes mellitus with stage 3b chronic kidney disease, without long-term current use of insulin (H), PAF (paroxysmal atrial fibrillation) (H), RASHIDA (obstructive sleep apnea), Stage 3b chronic kidney disease (H), Chronic diastolic heart failure (H), Hypertension goal BP (blood pressure) < 140/80, Status post liver transplantation (H), Nocturnal hypoxemia, Abnormal liver function tests, Intra-abdominal infection, Immunocompromised, Hx of bacteremia, Hx of sepsis, Seborrheic dermatitis, Papillary thyroid carcinoma (H), Vaginal bleeding, Epistaxis, Benign essential hypertension, Dizziness, and Leukocytosis, unspecified type were also pertinent to this visit.    Met with patient who was found sitting at edge of bed after arriving back to TCU post CT scan. She denies any chest pain, palpitations, shortness of breath, [place], lightheadedness, dizziness, or cough. Some low back pain with palpation noted today. Labs were reviewed with her today with ongoing worsening WBC counts. Urine was negative 1 week ago, will re-assess this today given complaints. She denies any abdominal discomfort. Denies N&V. Denies dysuria or frequency. Ongoing  "vaginal bleeding noted today. She reports it continues to be painless. Last episode was 1 week ago. Open to OBGYN referral especially given her family history. Denies loose or constipation. Appetite good. Sleeping well. Open to recommendations below.     BP Readings from Last 3 Encounters:   05/05/25 129/65   05/02/25 (!) 142/57   04/30/25 138/69     Wt Readings from Last 5 Encounters:   05/05/25 82.1 kg (181 lb)   05/02/25 82.2 kg (181 lb 3.2 oz)   04/30/25 82.1 kg (181 lb)   04/28/25 82.1 kg (181 lb)   04/28/25 82.1 kg (181 lb)     Allergies, and PMH/PSH reviewed in EPIC today.  REVIEW OF SYSTEMS:  4 point ROS including Respiratory, CV, GI and , other than that noted in the HPI,  is negative    Objective:   /65   Pulse 67   Temp 97.7  F (36.5  C)   Resp 18   Ht 1.702 m (5' 7\")   Wt 82.1 kg (181 lb)   LMP 06/01/1988 (Approximate)   SpO2 95%   BMI 28.35 kg/m    GENERAL APPEARANCE:  Alert, in no distress, cooperative  ENT:  Mouth and posterior oropharynx normal, moist mucous membranes, [place]  EYES:  EOM, conjunctivae, lids, pupils and irises normal  NECK:  No adenopathy,masses or thyromegaly  RESP:  respiratory effort and palpation of chest normal, lungs clear to auscultation , no respiratory distress  CV:  regular rate and rhythm, no murmur, rub, or gallop, peripheral edema 1+ in BLE  ABDOMEN:  normal bowel sounds, soft, nontender, no hepatosplenomegaly or other masses, no guarding or rebound  M/S:   Ambulates with walker. Staff to assist for ADLs, transfers and cares  SKIN:  Inspection of skin and subcutaneous tissue baseline, Palpation of skin and subcutaneous tissue baseline  NEURO:   Cranial nerves 2-12 are normal tested and grossly at patient's baseline, no purposeful movement in upper and lower extremities  PSYCH:  oriented X 3, memory impaired , affect and mood normal    Most Recent 3 CBC's:  Recent Labs   Lab Test 05/05/25  1102 04/28/25  1213 04/20/25  0637   WBC 15.6* 14.2* 8.3   HGB 10.1* " 9.9* 8.6*   MCV 90 88 87    497* 401     Most Recent 3 BMP's:  Recent Labs   Lab Test 05/05/25  1102 04/28/25  1213 04/23/25  0754 04/20/25  0950 04/20/25  0637    139  --   --  143   POTASSIUM 4.7 4.5  --   --  4.2   CHLORIDE 103 103  --   --  110*   CO2 22 20*  --   --  23   BUN 44.3* 39.9*  --   --  35.6*   CR 1.54* 1.45*  --   --  1.33*   ANIONGAP 15 16*  --   --  10   KEILY 9.3 9.3  --   --  8.8   GLC 93 103* 126*   < > 113*    < > = values in this interval not displayed.     Most Recent 2 LFT's:  Recent Labs   Lab Test 05/05/25  1102 04/28/25  1213   AST 20 19   ALT 14 11   ALKPHOS 101 91   BILITOTAL 0.3 0.2     Most Recent ESR & CRP:  Recent Labs   Lab Test 05/05/25  1102 04/28/25  1213 04/15/25  0613 06/13/23  1820 08/26/19  2331   SED  --   --  61*  --  78*   CRP  --   --   --   --  180.0*   CRPI <3.00   < >  --    < >  --     < > = values in this interval not displayed.     Most Recent Anemia Panel:  Recent Labs   Lab Test 05/05/25  1102 04/14/25  1410 04/07/25  0945 09/04/24  1008 06/15/23  1028   WBC 15.6*   < > 6.5   < > 7.6   HGB 10.1*   < > 9.1*   < > 12.1   HCT 32.9*   < > 30.0*   < > 38.4   MCV 90   < > 88   < > 92      < > 351   < > 345   IRON  --   --  36*   < >  --    IRONSAT  --   --  13*   < >  --    FEB  --   --  277   < >  --    LAURA  --   --  44   < >  --    B12  --   --   --   --  456    < > = values in this interval not displayed.     Narrative & Impression  EXAMINATION: CT ABDOMEN PELVIS W/O CONTRAST, 5/5/2025 12:32 PM     INDICATION: Contained bowel perforation, interval imaging follow up;  Perforation of intestine (H)     COMPARISON STUDY: CT abdomen and pelvis dated 4/16/2025.     TECHNIQUE: CT scan of the abdomen and pelvis was performed on  multidetector CT scanner using volumetric acquisition technique and  images were reconstructed in multiple planes with variable thickness  and reviewed on dedicated workstations.      CONTRAST: None.     CT scan radiation dose is  optimized to minimum requisite dose using  automated dose modulation techniques.     FINDINGS:     Lower thorax: Reduced trace bilateral pleural effusions with adjacent  atelectasis. Similar degree of chronic calcified opacities in the  right greater than left lower lobes. Interlobular septal thickening at  the bases. Reticular opacities peripherally in the lingula and right  middle lobe. Aortic and mitral annuli calcifications. Multivessel  coronary artery atherosclerotic calcifications.     Liver: Postsurgical changes of partial liver transplantation.  Unchanged curvilinear hypodensity in the inferior right lobe. New  trace pneumobilia.     Biliary System: Gallbladder surgically absent.     Pancreas: Diffuse fatty atrophic changes without ductal dilatation.  Stable 1.2 cm likely cyst of the pancreatic head.     Adrenal glands: No mass or nodules     Spleen: Normal.     Kidneys: Nonspecific bilateral perinephric fat stranding. Atrophic  changes bilaterally. Nephrolithiasis measuring up to 4 mm versus  vascular calcification bilaterally. No hydronephrosis. Unchanged  indeterminate density exophytic bilateral renal cysts.     Gastrointestinal tract :No dilated loops of small bowel or colon. The  appendix is surgically absent. Colonic diverticulosis. There is trace  free fluid about the sigmoid colon and mild presacral edema. Fluid  density posterior to the sigmoid colon containing a focus of air  (3/332), measuring 2.3 x 2.3 x 4.1 cm (AP X ML X CC), previously  measuring 2.3 x 1.7 x 3.9 cm. Small duodenal diverticulum.     Mesentery/peritoneum/retroperitoneum: No mass. No free fluid or air.     Lymph nodes: No significant lymphadenopathy.     Vasculature: Scattered atherosclerotic calcification of abdominal  aorta with no aneurysmal dilation.     Pelvis: Urinary bladder is normal.     Osseous structures: No aggressive or acute osseous lesion.  Multilevel  degenerative changes of the visualized spine.      Soft  tissues: Small fat containing ventral hernias.                                                                      IMPRESSION:   1. Mild enlargement of fluid collection containing focus of air  posterior to the sigmoid colon measuring up to 4.1 cm in craniocaudal  dimension, which remains concerning for a contained perforation from  prior diverticulitis.  2. 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  3. Postsurgical changes of liver transplant with stable curvilinear  hypodensity in the inferior right lobe. Interval new mild pneumobilia.  4. Reduced trace bilateral pleural effusions with adjacent  atelectasis.   5. Stable ancillary findings as detailed in the body of the report.      RAJIV ALLEN MD     ASSESSMENT/PLAN:  sepsis 2/2 intraabdominal infection - resolved  Recurrent UTIs on IV ertapenem since 3/9/2024  Recent Hx of E. Faecalis bacteremia  Sigmoid microperforation  Leukocytosis  Comment: Admitted for generalized weakness likely 2/2 infection. Found to have intraabdominal infection secondary to a sigmoid microperforation. Recent admission in March 2025 for Klebsiella pneumonia UTI and E. Faecalis bacteremia with recommendations from ID for ertapenem 1g q24h until EOT 4/22. RVP negative.-Abdominal CT scan scheduled as directed for 5/5/25 at 1220pm--results as reviewed above  Plan:  -Follow up with ID as directed. Scheduled for Monday 5/28/25--pending further confirmation of IV plan post CT scan results above  -Follow up with urology as directed. Scheduled for 6/10/25  -Continue vaginal estradiol MWF at HS  -Collect UA/UC to rule out acute concerns.   -Monitor for worsening s/symptoms of concerns  -Continue IV ertapenem 1gm daily as directed.Estimated until 5/8/25. Pending CT scan results for further ID advisement  -CMP, CBC with diff and CRP due weekly on Mondays until 5/12/25. Fax results to [phone] ATTN Sindhu Del Rio  -BMP and CBC  with diff due 5/9/25     Liver transplant in 2002 2/2 primary biliary cirrhosis  Comment: Chronic. Follows Dr. Ramirez.   Plan:  -Continue PTA sirolimus 1 mg every day  -Reduced prednisone down to 9mg daily for now. (5mg with 4 tablets of 1mg=9mg total)  -Continue PTA ursodiol 250 mg BID  -Follow up with transplant team as directed  -CMP, CBC with diff and CRP due weekly on Mondays until 5/12/25. Fax results to [phone] ATTDENNIS Adaira  -BMP and CBC with diff due 5/9/25     Depression   Comment: Chronic. Reports feeling more down and depressed with little interest in doing things. Has a therapist in outpatient setting and has been on anti-depressants historically.   Plan:  -Monitor mood and behaviors  -Monitor for worsening s/symptoms of concerns  -Monitor for changes in mobility, eating and sleeping patterns  -Continue zoloft 50mg daily.   -Follow up with psych post TCU  -CMP, CBC with diff and CRP due weekly on Mondays until 5/12/25. Fax results to [phone] AUSTYN Manley Quang  -BMP and CBC with diff due 5/9/25     Mild Obstructive Sleep Apnea   Nocturnal Hypoxemia   Comment: Chronic. Sleep study in 2018 showing mild RASHIDA with recommendation to start CPAP. IT does not appear that there was further follow up and not using CPAP.  Plan:  -Monitor sleeping patterns  -Monitor respiratory status  -PCP to consider sleep medicine referral  -CMP, CBC with diff and CRP due weekly on Mondays until 5/12/25. Fax results to [phone] AUSTYN Coffeytana  -BMP and CBC with diff due 5/9/25     CKD3b w/ moderate proteinuria, at baseline  HTN  CVD/HLD  paroxysmal atrial fibrillation  Comment: Chronic. Baseline Cr. 1.5. History of dialysis at time of liver transplant 23 years ago. Recurrent AKIs and immunosuppressive medication toxicity. Not on SGLT2i 2/2 recurrent UTI. Chart review w/ unclear heart failure history, unconfirmed with EF 60-65% on 3/10/25. BNP 1446 elevated from 1 month ago. Given unclear HF  history, trace bilateral LE edema, with some pulm edema noted on imaging, consideration for volume overload. However, without orthopnea or clear cough. Will defer diuresis.  Plan:  -Continue amlodipine 5mg daily. HOLD if SBP<100  -Continue Meclizine 25mg TID PRN  -Continue PTA hydralazine 50mg TID. Hold for SBP <100mmHg.   -Continue PTA metoprolol succinate 25mg every day. HOLD if SBP<100 and/or HR<55  -Monitor BP and HR  -HOLD PTA evolocumab q2week. Follow up with PCP post TCU to resume. Resume off while on ANTIBIOTIC course-due 5/8 or later  -Continue PTA eztimibe 10mg every day  -HOLD apixaban for 3 days then reduce down to 2.5mg BID ongoing given risks  -CMP, CBC with diff and CRP due weekly on Mondays until 5/12/25. Fax results to [phone] ATTDENNIS Adaira  -BMP and CBC with diff due 5/9/25          T2DM  (A1c 6.8% 4/14/25)  Comment: Chronic. Last A1c 6.8% on 4/14/25.   Plan:  -Continue PTA linagliptin 5mg every day  -Continue PTA gabapentin 300mg at bedtime  -Blood Glucose monitoring back to previous schedule of BID. HOLD off on using freestyle phil 2 sensor while on TCU  -CMP, CBC with diff and CRP due weekly on Mondays until 5/12/25. 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 Wound does not look infected.  Plan:  -Monitor for worsening s/symptoms of concerns  -Was scheduled for dermatology follow up appointment on 4/22, will need to be rescheduled. Discussed with Derm resident on call 4/21 who saw patient at bedside and discussed with her MOHS surgeon.      anemia, chronic  Comment: Chronic. Hgb 9.9 on admit. Stable.  Plan:  -Monitor bleeding risks  -Continue PTA ferrous sulfate daily  -Continue folic acid daily  -CMP, CBC with diff and CRP due weekly on Mondays until 5/12/25. Fax results to [phone] ATTN  Sindhu Coffeytana  -BMP and CBC with diff due 5/9/25     Hypothyroidism  Papillary thyroid carcinoma  Chronic. Recent TSH~1.72 on 4/4/25  Plan;  -Continue PTA levothyroxine 175mcg qD   -Monitor for worsening s/symptoms of concerns  -CMP, CBC with diff and CRP due weekly on Mondays until 5/12/25. Fax results to [phone] ATTN Sindhu Adaira  -BMP and CBC with diff due 5/9/25     Chronic Shoulder Pain  Osteoarthritis   Lower back pain  Comment: Chronic. stable  Plan:  -Monitor pain complaints  -Continue diclofenac gel application to painful areas QID  -Continue Tylenol 1000mg BID   -Continue gabapentin 300mg at HS--only able to tolerate liquid given gelatin capsule allergy  -CMP, CBC with diff and CRP due weekly on Mondays until 5/12/25. Fax results to [phone] ATTN Sindhu Adaira  -BMP and CBC with diff due 5/9/25     Constipation   Comment: Chronic. S/T polypharmacy  Plan:  -Monitor BM patterns  -Continue beneFiber qd    -Zofran PRN     Epistaxis  Comment: Acute on chronic. She reports occasional nose bleed at times. No episodes while on TCU. She reports history of good relief with pressure and ice application  Plan:  -Monitor bleeding risks  -Monitor for worsening s/symptoms of concerns  -Afrin BID PRN on TCU if warranted     Vaginal bleeding  Comment: Acute on chronic. Per staff on 4/28:  WERO reported that pt underwear had a lot of blood on it. Writer with charge nurse went in to check. There was bright red blood on the pt's underwear. Pt stated that she has no idea why there is blood. Some blood clot noted as well. Pt stated that she feels some discomfort on her lower left abdomen but denied pain elsewhere. Writer rechecked to see if there was more blood after about 2 hours and there was none. Np updated, new order to collect UA/UC. Last menstrual period she reports has been 40 years ago or so. Known family history of endometrial cancer that metastasized to bowel per her reports. She reports her mother  had extensive genetic testing for review in EPIC. Reports mother name: Anne Yap  3/9/23 for reference if indicated. Another episode of small vaginal bleeding noted today. Continues to have no pain.   Plan:  -urine was negative for concerns or evidence of blood  -HOLD apixaban for 3 days then reduce down to 2.5mg BID thereafter for now given risks  -CMP, CBC with diff and CRP due weekly on  until 25. Fax results to [phone] ATTN Sindhu Del Rio  -BMP and CBC with diff due 25  -Urgent referral for OBGYN referral in place for further testing needs---Scheduled for 25, pending staff to call to see if any appt is available sooner or if she can be placed on cancellation list if able.      Physical deconditioning  Generalized muscle weakness  Comment: Acute on chronic. Known poor history PTA. [facility] given concerns. Per therapy-Ambulates up to 150ft with upwalker. SBA for most needs and LB cares.  on the SLUMS, just below normal indicating MNCD  Plan:  -Continue Physical therapy and Occupational therapy  -Recommend cognitive testing on site  -highly recommend MIKEY compliance post TCU     Electronically signed by:  Dr. Madna Flor DNP, APRN, FNP-C, WCS-C, EDS-C     Provider reviewed records from facility, and interpreted most recent imaging/lab work, and vital signs.   Acute and chronic conditions managed by writer. Have been reviewed during today's exam

## 2025-05-05 ENCOUNTER — LAB REQUISITION (OUTPATIENT)
Dept: LAB | Facility: CLINIC | Age: 75
End: 2025-05-05
Payer: MEDICARE

## 2025-05-05 ENCOUNTER — ANCILLARY PROCEDURE (OUTPATIENT)
Dept: CT IMAGING | Facility: CLINIC | Age: 75
End: 2025-05-05
Payer: MEDICARE

## 2025-05-05 ENCOUNTER — TRANSITIONAL CARE UNIT VISIT (OUTPATIENT)
Dept: GERIATRICS | Facility: CLINIC | Age: 75
End: 2025-05-05
Payer: MEDICARE

## 2025-05-05 VITALS
BODY MASS INDEX: 28.41 KG/M2 | HEART RATE: 67 BPM | TEMPERATURE: 97.7 F | DIASTOLIC BLOOD PRESSURE: 65 MMHG | OXYGEN SATURATION: 95 % | SYSTOLIC BLOOD PRESSURE: 129 MMHG | HEIGHT: 67 IN | WEIGHT: 181 LBS | RESPIRATION RATE: 18 BRPM

## 2025-05-05 DIAGNOSIS — L21.9 SEBORRHEIC DERMATITIS: ICD-10-CM

## 2025-05-05 DIAGNOSIS — D72.829 ELEVATED WHITE BLOOD CELL COUNT, UNSPECIFIED: ICD-10-CM

## 2025-05-05 DIAGNOSIS — Z87.898 HX OF BACTEREMIA: ICD-10-CM

## 2025-05-05 DIAGNOSIS — I10 BENIGN ESSENTIAL HYPERTENSION: ICD-10-CM

## 2025-05-05 DIAGNOSIS — K59.01 SLOW TRANSIT CONSTIPATION: ICD-10-CM

## 2025-05-05 DIAGNOSIS — N18.32 TYPE 2 DIABETES MELLITUS WITH STAGE 3B CHRONIC KIDNEY DISEASE, WITHOUT LONG-TERM CURRENT USE OF INSULIN (H): ICD-10-CM

## 2025-05-05 DIAGNOSIS — N18.32 STAGE 3B CHRONIC KIDNEY DISEASE (H): ICD-10-CM

## 2025-05-05 DIAGNOSIS — I50.32 CHRONIC DIASTOLIC HEART FAILURE (H): ICD-10-CM

## 2025-05-05 DIAGNOSIS — C73 PAPILLARY THYROID CARCINOMA (H): ICD-10-CM

## 2025-05-05 DIAGNOSIS — C44.329 SQUAMOUS CELL CANCER OF SKIN OF LEFT CHEEK: ICD-10-CM

## 2025-05-05 DIAGNOSIS — M25.519 CHRONIC SHOULDER PAIN, UNSPECIFIED LATERALITY: ICD-10-CM

## 2025-05-05 DIAGNOSIS — G89.29 CHRONIC SHOULDER PAIN, UNSPECIFIED LATERALITY: ICD-10-CM

## 2025-05-05 DIAGNOSIS — D64.9 CHRONIC ANEMIA: ICD-10-CM

## 2025-05-05 DIAGNOSIS — I48.0 PAF (PAROXYSMAL ATRIAL FIBRILLATION) (H): ICD-10-CM

## 2025-05-05 DIAGNOSIS — M54.50 BILATERAL LOW BACK PAIN WITHOUT SCIATICA, UNSPECIFIED CHRONICITY: ICD-10-CM

## 2025-05-05 DIAGNOSIS — M81.0 AGE-RELATED OSTEOPOROSIS WITHOUT CURRENT PATHOLOGICAL FRACTURE: ICD-10-CM

## 2025-05-05 DIAGNOSIS — R79.89 ABNORMAL LIVER FUNCTION TESTS: ICD-10-CM

## 2025-05-05 DIAGNOSIS — G47.34 NOCTURNAL HYPOXEMIA: ICD-10-CM

## 2025-05-05 DIAGNOSIS — B99.9 INTRA-ABDOMINAL INFECTION: ICD-10-CM

## 2025-05-05 DIAGNOSIS — E78.5 HYPERLIPIDEMIA LDL GOAL <70: ICD-10-CM

## 2025-05-05 DIAGNOSIS — Z98.890 S/P MOHS SURGERY FOR BASAL CELL CARCINOMA: ICD-10-CM

## 2025-05-05 DIAGNOSIS — E11.22 TYPE 2 DIABETES MELLITUS WITH STAGE 3B CHRONIC KIDNEY DISEASE, WITHOUT LONG-TERM CURRENT USE OF INSULIN (H): ICD-10-CM

## 2025-05-05 DIAGNOSIS — G47.33 OSA (OBSTRUCTIVE SLEEP APNEA): ICD-10-CM

## 2025-05-05 DIAGNOSIS — M62.81 GENERALIZED MUSCLE WEAKNESS: ICD-10-CM

## 2025-05-05 DIAGNOSIS — N93.9 VAGINAL BLEEDING: ICD-10-CM

## 2025-05-05 DIAGNOSIS — D72.829 LEUKOCYTOSIS, UNSPECIFIED TYPE: ICD-10-CM

## 2025-05-05 DIAGNOSIS — Z86.19 HX OF SEPSIS: ICD-10-CM

## 2025-05-05 DIAGNOSIS — R53.81 PHYSICAL DECONDITIONING: Primary | ICD-10-CM

## 2025-05-05 DIAGNOSIS — I10 HYPERTENSION GOAL BP (BLOOD PRESSURE) < 140/80: ICD-10-CM

## 2025-05-05 DIAGNOSIS — M54.50 LOW BACK PAIN, UNSPECIFIED: ICD-10-CM

## 2025-05-05 DIAGNOSIS — F32.A DEPRESSION, UNSPECIFIED DEPRESSION TYPE: ICD-10-CM

## 2025-05-05 DIAGNOSIS — K63.1 PERFORATION OF INTESTINE (H): ICD-10-CM

## 2025-05-05 DIAGNOSIS — Z94.4 STATUS POST LIVER TRANSPLANTATION (H): ICD-10-CM

## 2025-05-05 DIAGNOSIS — E03.9 HYPOTHYROIDISM, UNSPECIFIED TYPE: ICD-10-CM

## 2025-05-05 DIAGNOSIS — R42 DIZZINESS: ICD-10-CM

## 2025-05-05 DIAGNOSIS — Z85.828 S/P MOHS SURGERY FOR BASAL CELL CARCINOMA: ICD-10-CM

## 2025-05-05 DIAGNOSIS — R04.0 EPISTAXIS: ICD-10-CM

## 2025-05-05 DIAGNOSIS — D84.9 IMMUNOCOMPROMISED: ICD-10-CM

## 2025-05-05 LAB
ALBUMIN SERPL BCG-MCNC: 3.9 G/DL (ref 3.5–5.2)
ALBUMIN UR-MCNC: NEGATIVE MG/DL
ALP SERPL-CCNC: 101 U/L (ref 40–150)
ALT SERPL W P-5'-P-CCNC: 14 U/L (ref 0–50)
ANION GAP SERPL CALCULATED.3IONS-SCNC: 15 MMOL/L (ref 7–15)
APPEARANCE UR: CLEAR
AST SERPL W P-5'-P-CCNC: 20 U/L (ref 0–45)
BASOPHILS # BLD AUTO: 0.1 10E3/UL (ref 0–0.2)
BASOPHILS NFR BLD AUTO: 0 %
BILIRUB SERPL-MCNC: 0.3 MG/DL
BILIRUB UR QL STRIP: NEGATIVE
BUN SERPL-MCNC: 44.3 MG/DL (ref 8–23)
BURR CELLS BLD QL SMEAR: SLIGHT
CALCIUM SERPL-MCNC: 9.3 MG/DL (ref 8.8–10.4)
CHLORIDE SERPL-SCNC: 103 MMOL/L (ref 98–107)
COLOR UR AUTO: ABNORMAL
CREAT SERPL-MCNC: 1.54 MG/DL (ref 0.51–0.95)
CRP SERPL-MCNC: <3 MG/L
EGFRCR SERPLBLD CKD-EPI 2021: 35 ML/MIN/1.73M2
EOSINOPHIL # BLD AUTO: 0.1 10E3/UL (ref 0–0.7)
EOSINOPHIL NFR BLD AUTO: 1 %
ERYTHROCYTE [DISTWIDTH] IN BLOOD BY AUTOMATED COUNT: 18.8 % (ref 10–15)
GLUCOSE SERPL-MCNC: 93 MG/DL (ref 70–99)
GLUCOSE UR STRIP-MCNC: NEGATIVE MG/DL
HCO3 SERPL-SCNC: 22 MMOL/L (ref 22–29)
HCT VFR BLD AUTO: 32.9 % (ref 35–47)
HGB BLD-MCNC: 10.1 G/DL (ref 11.7–15.7)
HGB UR QL STRIP: ABNORMAL
IMM GRANULOCYTES # BLD: 0.4 10E3/UL
IMM GRANULOCYTES NFR BLD: 2 %
KETONES UR STRIP-MCNC: NEGATIVE MG/DL
LEUKOCYTE ESTERASE UR QL STRIP: NEGATIVE
LYMPHOCYTES # BLD AUTO: 1.8 10E3/UL (ref 0.8–5.3)
LYMPHOCYTES NFR BLD AUTO: 12 %
MCH RBC QN AUTO: 27.6 PG (ref 26.5–33)
MCHC RBC AUTO-ENTMCNC: 30.7 G/DL (ref 31.5–36.5)
MCV RBC AUTO: 90 FL (ref 78–100)
MONOCYTES # BLD AUTO: 1.6 10E3/UL (ref 0–1.3)
MONOCYTES NFR BLD AUTO: 10 %
MUCOUS THREADS #/AREA URNS LPF: PRESENT /LPF
NEUTROPHILS # BLD AUTO: 11.7 10E3/UL (ref 1.6–8.3)
NEUTROPHILS NFR BLD AUTO: 75 %
NITRATE UR QL: NEGATIVE
NRBC # BLD AUTO: 0 10E3/UL
NRBC BLD AUTO-RTO: 0 /100
PH UR STRIP: 6.5 [PH] (ref 5–7)
PLAT MORPH BLD: ABNORMAL
PLATELET # BLD AUTO: 400 10E3/UL (ref 150–450)
POLYCHROMASIA BLD QL SMEAR: SLIGHT
POTASSIUM SERPL-SCNC: 4.7 MMOL/L (ref 3.4–5.3)
PROT SERPL-MCNC: 6.7 G/DL (ref 6.4–8.3)
RBC # BLD AUTO: 3.66 10E6/UL (ref 3.8–5.2)
RBC MORPH BLD: ABNORMAL
RBC URINE: 84 /HPF
SODIUM SERPL-SCNC: 140 MMOL/L (ref 135–145)
SP GR UR STRIP: 1.01 (ref 1–1.03)
UROBILINOGEN UR STRIP-MCNC: NORMAL MG/DL
WBC # BLD AUTO: 15.6 10E3/UL (ref 4–11)
WBC URINE: 1 /HPF

## 2025-05-05 PROCEDURE — 86140 C-REACTIVE PROTEIN: CPT | Performed by: NURSE PRACTITIONER

## 2025-05-05 PROCEDURE — 81003 URINALYSIS AUTO W/O SCOPE: CPT | Performed by: NURSE PRACTITIONER

## 2025-05-05 PROCEDURE — 84155 ASSAY OF PROTEIN SERUM: CPT | Performed by: NURSE PRACTITIONER

## 2025-05-05 PROCEDURE — 84156 ASSAY OF PROTEIN URINE: CPT | Performed by: NURSE PRACTITIONER

## 2025-05-05 PROCEDURE — 85004 AUTOMATED DIFF WBC COUNT: CPT | Performed by: NURSE PRACTITIONER

## 2025-05-05 PROCEDURE — 99309 SBSQ NF CARE MODERATE MDM 30: CPT | Performed by: NURSE PRACTITIONER

## 2025-05-05 PROCEDURE — 36415 COLL VENOUS BLD VENIPUNCTURE: CPT | Performed by: NURSE PRACTITIONER

## 2025-05-05 PROCEDURE — 74176 CT ABD & PELVIS W/O CONTRAST: CPT | Performed by: RADIOLOGY

## 2025-05-05 PROCEDURE — 82043 UR ALBUMIN QUANTITATIVE: CPT | Performed by: NURSE PRACTITIONER

## 2025-05-05 NOTE — LETTER
5/5/2025      Luz Thompson  [address]  [facility] [address]        [facility] GERIATRICS    Chief Complaint   Patient presents with     RECHECK     HPI:  Luz Thompson is a 75 year old  (1950), who is being seen today for an episodic care visit at: [facility] ([facility])([facility]) [[id number]]. Today's concern is: The primary encounter diagnosis was Physical deconditioning. Diagnoses of Generalized muscle weakness, Slow transit constipation, Age-related osteoporosis without current pathological fracture, Chronic shoulder pain, unspecified laterality, Bilateral low back pain without sciatica, unspecified chronicity, Chronic anemia, Hypothyroidism, unspecified type, 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, S/P Mohs surgery for basal cell carcinoma, Hyperlipidemia LDL goal <70, Depression, unspecified depression type, Type 2 diabetes mellitus with stage 3b chronic kidney disease, without long-term current use of insulin (H), PAF (paroxysmal atrial fibrillation) (H), RASHIDA (obstructive sleep apnea), Stage 3b chronic kidney disease (H), Chronic diastolic heart failure (H), Hypertension goal BP (blood pressure) < 140/80, Status post liver transplantation (H), Nocturnal hypoxemia, Abnormal liver function tests, Intra-abdominal infection, Immunocompromised, Hx of bacteremia, Hx of sepsis, Seborrheic dermatitis, Papillary thyroid carcinoma (H), Vaginal bleeding, Epistaxis, Benign essential hypertension, Dizziness, and Leukocytosis, unspecified type were also pertinent to this visit.    Met with patient who was found sitting at edge of bed after arriving back to TCU post CT scan. She denies any chest pain, palpitations, shortness of breath, [place], lightheadedness, dizziness, or cough. Some low back pain with palpation noted today. Labs were reviewed with her today with ongoing worsening WBC counts. Urine was negative 1 week ago, will re-assess this today given complaints.  "She denies any abdominal discomfort. Denies N&V. Denies dysuria or frequency. Ongoing vaginal bleeding noted today. She reports it continues to be painless. Last episode was 1 week ago. Open to OBGYN referral especially given her family history. Denies loose or constipation. Appetite good. Sleeping well. Open to recommendations below.     BP Readings from Last 3 Encounters:   05/05/25 129/65   05/02/25 (!) 142/57   04/30/25 138/69     Wt Readings from Last 5 Encounters:   05/05/25 82.1 kg (181 lb)   05/02/25 82.2 kg (181 lb 3.2 oz)   04/30/25 82.1 kg (181 lb)   04/28/25 82.1 kg (181 lb)   04/28/25 82.1 kg (181 lb)     Allergies, and PMH/PSH reviewed in EPIC today.  REVIEW OF SYSTEMS:  4 point ROS including Respiratory, CV, GI and , other than that noted in the HPI,  is negative    Objective:   /65   Pulse 67   Temp 97.7  F (36.5  C)   Resp 18   Ht 1.702 m (5' 7\")   Wt 82.1 kg (181 lb)   LMP 06/01/1988 (Approximate)   SpO2 95%   BMI 28.35 kg/m    GENERAL APPEARANCE:  Alert, in no distress, cooperative  ENT:  Mouth and posterior oropharynx normal, moist mucous membranes, [place]  EYES:  EOM, conjunctivae, lids, pupils and irises normal  NECK:  No adenopathy,masses or thyromegaly  RESP:  respiratory effort and palpation of chest normal, lungs clear to auscultation , no respiratory distress  CV:  regular rate and rhythm, no murmur, rub, or gallop, peripheral edema 1+ in BLE  ABDOMEN:  normal bowel sounds, soft, nontender, no hepatosplenomegaly or other masses, no guarding or rebound  M/S:   Ambulates with walker. Staff to assist for ADLs, transfers and cares  SKIN:  Inspection of skin and subcutaneous tissue baseline, Palpation of skin and subcutaneous tissue baseline  NEURO:   Cranial nerves 2-12 are normal tested and grossly at patient's baseline, no purposeful movement in upper and lower extremities  PSYCH:  oriented X 3, memory impaired , affect and mood normal    Most Recent 3 CBC's:  Recent Labs   Lab " Test 05/05/25  1102 04/28/25  1213 04/20/25  0637   WBC 15.6* 14.2* 8.3   HGB 10.1* 9.9* 8.6*   MCV 90 88 87    497* 401     Most Recent 3 BMP's:  Recent Labs   Lab Test 05/05/25  1102 04/28/25  1213 04/23/25  0754 04/20/25  0950 04/20/25  0637    139  --   --  143   POTASSIUM 4.7 4.5  --   --  4.2   CHLORIDE 103 103  --   --  110*   CO2 22 20*  --   --  23   BUN 44.3* 39.9*  --   --  35.6*   CR 1.54* 1.45*  --   --  1.33*   ANIONGAP 15 16*  --   --  10   KEILY 9.3 9.3  --   --  8.8   GLC 93 103* 126*   < > 113*    < > = values in this interval not displayed.     Most Recent 2 LFT's:  Recent Labs   Lab Test 05/05/25  1102 04/28/25  1213   AST 20 19   ALT 14 11   ALKPHOS 101 91   BILITOTAL 0.3 0.2     Most Recent ESR & CRP:  Recent Labs   Lab Test 05/05/25  1102 04/28/25  1213 04/15/25  0613 06/13/23  1820 08/26/19  2331   SED  --   --  61*  --  78*   CRP  --   --   --   --  180.0*   CRPI <3.00   < >  --    < >  --     < > = values in this interval not displayed.     Most Recent Anemia Panel:  Recent Labs   Lab Test 05/05/25  1102 04/14/25  1410 04/07/25  0945 09/04/24  1008 06/15/23  1028   WBC 15.6*   < > 6.5   < > 7.6   HGB 10.1*   < > 9.1*   < > 12.1   HCT 32.9*   < > 30.0*   < > 38.4   MCV 90   < > 88   < > 92      < > 351   < > 345   IRON  --   --  36*   < >  --    IRONSAT  --   --  13*   < >  --    FEB  --   --  277   < >  --    LAURA  --   --  44   < >  --    B12  --   --   --   --  456    < > = values in this interval not displayed.     Narrative & Impression  EXAMINATION: CT ABDOMEN PELVIS W/O CONTRAST, 5/5/2025 12:32 PM     INDICATION: Contained bowel perforation, interval imaging follow up;  Perforation of intestine (H)     COMPARISON STUDY: CT abdomen and pelvis dated 4/16/2025.     TECHNIQUE: CT scan of the abdomen and pelvis was performed on  multidetector CT scanner using volumetric acquisition technique and  images were reconstructed in multiple planes with variable thickness  and  reviewed on dedicated workstations.      CONTRAST: None.     CT scan radiation dose is optimized to minimum requisite dose using  automated dose modulation techniques.     FINDINGS:     Lower thorax: Reduced trace bilateral pleural effusions with adjacent  atelectasis. Similar degree of chronic calcified opacities in the  right greater than left lower lobes. Interlobular septal thickening at  the bases. Reticular opacities peripherally in the lingula and right  middle lobe. Aortic and mitral annuli calcifications. Multivessel  coronary artery atherosclerotic calcifications.     Liver: Postsurgical changes of partial liver transplantation.  Unchanged curvilinear hypodensity in the inferior right lobe. New  trace pneumobilia.     Biliary System: Gallbladder surgically absent.     Pancreas: Diffuse fatty atrophic changes without ductal dilatation.  Stable 1.2 cm likely cyst of the pancreatic head.     Adrenal glands: No mass or nodules     Spleen: Normal.     Kidneys: Nonspecific bilateral perinephric fat stranding. Atrophic  changes bilaterally. Nephrolithiasis measuring up to 4 mm versus  vascular calcification bilaterally. No hydronephrosis. Unchanged  indeterminate density exophytic bilateral renal cysts.     Gastrointestinal tract :No dilated loops of small bowel or colon. The  appendix is surgically absent. Colonic diverticulosis. There is trace  free fluid about the sigmoid colon and mild presacral edema. Fluid  density posterior to the sigmoid colon containing a focus of air  (3/332), measuring 2.3 x 2.3 x 4.1 cm (AP X ML X CC), previously  measuring 2.3 x 1.7 x 3.9 cm. Small duodenal diverticulum.     Mesentery/peritoneum/retroperitoneum: No mass. No free fluid or air.     Lymph nodes: No significant lymphadenopathy.     Vasculature: Scattered atherosclerotic calcification of abdominal  aorta with no aneurysmal dilation.     Pelvis: Urinary bladder is normal.     Osseous structures: No aggressive or acute  osseous lesion.  Multilevel  degenerative changes of the visualized spine.      Soft tissues: Small fat containing ventral hernias.                                                                      IMPRESSION:   1. Mild enlargement of fluid collection containing focus of air  posterior to the sigmoid colon measuring up to 4.1 cm in craniocaudal  dimension, which remains concerning for a contained perforation from  prior diverticulitis.  2. 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  3. Postsurgical changes of liver transplant with stable curvilinear  hypodensity in the inferior right lobe. Interval new mild pneumobilia.  4. Reduced trace bilateral pleural effusions with adjacent  atelectasis.   5. Stable ancillary findings as detailed in the body of the report.      RAJIV ALLEN MD     ASSESSMENT/PLAN:  sepsis 2/2 intraabdominal infection - resolved  Recurrent UTIs on IV ertapenem since 3/9/2024  Recent Hx of E. Faecalis bacteremia  Sigmoid microperforation  Leukocytosis  Comment: Admitted for generalized weakness likely 2/2 infection. Found to have intraabdominal infection secondary to a sigmoid microperforation. Recent admission in March 2025 for Klebsiella pneumonia UTI and E. Faecalis bacteremia with recommendations from ID for ertapenem 1g q24h until EOT 4/22. RVP negative.-Abdominal CT scan scheduled as directed for 5/5/25 at 1220pm--results as reviewed above  Plan:  -Follow up with ID as directed. Scheduled for Monday 5/28/25--pending further confirmation of IV plan post CT scan results above  -Follow up with urology as directed. Scheduled for 6/10/25  -Continue vaginal estradiol MWF at HS  -Collect UA/UC to rule out acute concerns.   -Monitor for worsening s/symptoms of concerns  -Continue IV ertapenem 1gm daily as directed.Estimated until 5/8/25. Pending CT scan results for further ID advisement  -CMP, CBC with diff and CRP due weekly on  Mondays until 5/12/25. Fax results to [phone] ATTN Sindhu Quang  -BMP and CBC with diff due 5/9/25     Liver transplant in 2002 2/2 primary biliary cirrhosis  Comment: Chronic. Follows Dr. Ramirez.   Plan:  -Continue PTA sirolimus 1 mg every day  -Reduced prednisone down to 9mg daily for now. (5mg with 4 tablets of 1mg=9mg total)  -Continue PTA ursodiol 250 mg BID  -Follow up with transplant team as directed  -CMP, CBC with diff and CRP due weekly on Mondays until 5/12/25. Fax results to [phone] ATTN Sindhu [place]  -BMP and CBC with diff due 5/9/25     Depression   Comment: Chronic. Reports feeling more down and depressed with little interest in doing things. Has a therapist in outpatient setting and has been on anti-depressants historically.   Plan:  -Monitor mood and behaviors  -Monitor for worsening s/symptoms of concerns  -Monitor for changes in mobility, eating and sleeping patterns  -Continue zoloft 50mg daily.   -Follow up with psych post TCU  -CMP, CBC with diff and CRP due weekly on Mondays until 5/12/25. Fax results to [phone] ATTN Sindhu Quang  -BMP and CBC with diff due 5/9/25     Mild Obstructive Sleep Apnea   Nocturnal Hypoxemia   Comment: Chronic. Sleep study in 2018 showing mild RASHIDA with recommendation to start CPAP. IT does not appear that there was further follow up and not using CPAP.  Plan:  -Monitor sleeping patterns  -Monitor respiratory status  -PCP to consider sleep medicine referral  -CMP, CBC with diff and CRP due weekly on Mondays until 5/12/25. Fax results to [phone] ATTN Sindhu Quang  -BMP and CBC with diff due 5/9/25     CKD3b w/ moderate proteinuria, at baseline  HTN  CVD/HLD  paroxysmal atrial fibrillation  Comment: Chronic. Baseline Cr. 1.5. History of dialysis at time of liver transplant 23 years ago. Recurrent AKIs and immunosuppressive medication toxicity. Not on SGLT2i 2/2 recurrent UTI. Chart review w/ unclear heart failure history,  unconfirmed with EF 60-65% on 3/10/25. BNP 1446 elevated from 1 month ago. Given unclear HF history, trace bilateral LE edema, with some pulm edema noted on imaging, consideration for volume overload. However, without orthopnea or clear cough. Will defer diuresis.  Plan:  -Continue amlodipine 5mg daily. HOLD if SBP<100  -Continue Meclizine 25mg TID PRN  -Continue PTA hydralazine 50mg TID. Hold for SBP <100mmHg.   -Continue PTA metoprolol succinate 25mg every day. HOLD if SBP<100 and/or HR<55  -Monitor BP and HR  -HOLD PTA evolocumab q2week. Follow up with PCP post TCU to resume. Resume off while on ANTIBIOTIC course-due 5/8 or later  -Continue PTA eztimibe 10mg every day  -HOLD apixaban for 3 days then reduce down to 2.5mg BID ongoing given risks  -CMP, CBC with diff and CRP due weekly on Mondays until 5/12/25. Fax results to [phone] ATTDENNIS [place]  -BMP and CBC with diff due 5/9/25          T2DM  (A1c 6.8% 4/14/25)  Comment: Chronic. Last A1c 6.8% on 4/14/25.   Plan:  -Continue PTA linagliptin 5mg every day  -Continue PTA gabapentin 300mg at bedtime  -Blood Glucose monitoring back to previous schedule of BID. HOLD off on using freestyle phil 2 sensor while on TCU  -CMP, CBC with diff and CRP due weekly on Mondays until 5/12/25. 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 Wound does not look infected.  Plan:  -Monitor for worsening s/symptoms of concerns  -Was scheduled for dermatology follow up appointment on 4/22, will need to be rescheduled. Discussed with Derm resident on call 4/21 who saw patient at bedside and discussed with her MOHS surgeon.      anemia, chronic  Comment: Chronic. Hgb 9.9 on admit. Stable.  Plan:  -Monitor bleeding risks  -Continue PTA ferrous sulfate daily  -Continue folic acid daily  -CMP, CBC  with diff and CRP due weekly on Mondays until 5/12/25. Fax results to [phone] ATTN Sindhu [place]  -BMP and CBC with diff due 5/9/25     Hypothyroidism  Papillary thyroid carcinoma  Chronic. Recent TSH~1.72 on 4/4/25  Plan;  -Continue PTA levothyroxine 175mcg qD   -Monitor for worsening s/symptoms of concerns  -CMP, CBC with diff and CRP due weekly on Mondays until 5/12/25. Fax results to [phone] ATTN Sindhu [place]  -BMP and CBC with diff due 5/9/25     Chronic Shoulder Pain  Osteoarthritis   Lower back pain  Comment: Chronic. stable  Plan:  -Monitor pain complaints  -Continue diclofenac gel application to painful areas QID  -Continue Tylenol 1000mg BID   -Continue gabapentin 300mg at HS--only able to tolerate liquid given gelatin capsule allergy  -CMP, CBC with diff and CRP due weekly on Mondays until 5/12/25. Fax results to [phone] ATTN Sindhu [place]  -BMP and CBC with diff due 5/9/25     Constipation   Comment: Chronic. S/T polypharmacy  Plan:  -Monitor BM patterns  -Continue beneFiber qd    -Zofran PRN     Epistaxis  Comment: Acute on chronic. She reports occasional nose bleed at times. No episodes while on TCU. She reports history of good relief with pressure and ice application  Plan:  -Monitor bleeding risks  -Monitor for worsening s/symptoms of concerns  -Afrin BID PRN on TCU if warranted     Vaginal bleeding  Comment: Acute on chronic. Per staff on 4/28:  WERO reported that pt underwear had a lot of blood on it. Writer with charge nurse went in to check. There was bright red blood on the pt's underwear. Pt stated that she has no idea why there is blood. Some blood clot noted as well. Pt stated that she feels some discomfort on her lower left abdomen but denied pain elsewhere. Writer rechecked to see if there was more blood after about 2 hours and there was none. Np updated, new order to collect UA/UC. Last menstrual period she reports has been 40 years ago or so. Known family  history of endometrial cancer that metastasized to bowel per her reports. She reports her mother had extensive genetic testing for review in EPIC. Reports mother name: Anne Yap  3/9/23 for reference if indicated. Another episode of small vaginal bleeding noted today. Continues to have no pain.   Plan:  -urine was negative for concerns or evidence of blood  -HOLD apixaban for 3 days then reduce down to 2.5mg BID thereafter for now given risks  -CMP, CBC with diff and CRP due weekly on  until 25. Fax results to [phone] ATTN Snidhu [place]  -BMP and CBC with diff due 25  -Urgent referral for OBGYN referral in place for further testing needs---Scheduled for 25, pending staff to call to see if any appt is available sooner or if she can be placed on cancellation list if able.      Physical deconditioning  Generalized muscle weakness  Comment: Acute on chronic. Known poor history PTA. [facility] given concerns. Per therapy-Ambulates up to 150ft with upwalker. SBA for most needs and LB cares.  on the SLUMS, just below normal indicating MNCD  Plan:  -Continue Physical therapy and Occupational therapy  -Recommend cognitive testing on site  -highly recommend MIKEY compliance post TCU     Electronically signed by:  Dr. Manda Flor DNP, APRN, FNP-C, WCS-C, EDS-C     Provider reviewed records from facility, and interpreted most recent imaging/lab work, and vital signs.   Acute and chronic conditions managed by writer. Have been reviewed during today's exam      Sincerely,        Manda Flor, LIZ CNP    Electronically signed

## 2025-05-06 ENCOUNTER — MYC MEDICAL ADVICE (OUTPATIENT)
Dept: INFECTIOUS DISEASES | Facility: CLINIC | Age: 75
End: 2025-05-06
Payer: MEDICARE

## 2025-05-06 DIAGNOSIS — N39.0 URINARY TRACT INFECTION: ICD-10-CM

## 2025-05-06 NOTE — PROGRESS NOTES
[facility] GERIATRICS    Chief Complaint   Patient presents with    RECHECK     HPI:  Luz Thompson is a 75 year old  (1950), who is being seen today for an episodic care visit at: [facility] ([facility])([facility]) [[id number]]. Today's concern is: The primary encounter diagnosis was Physical deconditioning. 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, S/P Mohs surgery for basal cell carcinoma, Hyperlipidemia LDL goal <70, Stage 3b chronic kidney disease (H), Type 2 diabetes mellitus with stage 3b chronic kidney disease, without long-term current use of insulin (H), Depression, unspecified depression type, PAF (paroxysmal atrial fibrillation) (H), RASHIDA (obstructive sleep apnea), Chronic diastolic heart failure (H), Hypertension goal BP (blood pressure) < 140/80, Abnormal liver function tests, Status post liver transplantation (H), Nocturnal hypoxemia, Intra-abdominal infection, Immunocompromised, Hx of sepsis, Hx of bacteremia, Seborrheic dermatitis, Papillary thyroid carcinoma (H), Vaginal bleeding, Epistaxis, Benign essential hypertension, Dizziness, and Skin lesion were also pertinent to this visit.    Met with patient who was found lying in bed prior to lunch. She denies any chest pain, palpitations, shortness of breath, [place], lightheadedness, dizziness, or cough. Denies any abdominal discomfort. Denies N&V. Denies B&B concerns. Denies dysuria or frequency. Denies loose or constipation. Denies any further vaginal bleeding since last visit. Appetite good. Sleeping well. No pain complaints. She does report having skin lesion to left anterior forearm. Recent SCC with S/p MOHs procedure to left  "face, suspect similar issues. Will defer to dermatology--next visit 5/13/25.     BP Readings from Last 3 Encounters:   05/07/25 125/75   05/05/25 129/65   05/02/25 (!) 142/57     Wt Readings from Last 5 Encounters:   05/07/25 82.1 kg (181 lb)   05/05/25 82.1 kg (181 lb)   05/02/25 82.2 kg (181 lb 3.2 oz)   04/30/25 82.1 kg (181 lb)   04/28/25 82.1 kg (181 lb)     Allergies, and PMH/PSH reviewed in EPIC today.  REVIEW OF SYSTEMS:  4 point ROS including Respiratory, CV, GI and , other than that noted in the HPI,  is negative    Objective:   /75   Pulse 65   Temp 97.5  F (36.4  C)   Resp 18   Ht 1.702 m (5' 7\")   Wt 82.1 kg (181 lb)   LMP 06/01/1988 (Approximate)   SpO2 96%   BMI 28.35 kg/m    GENERAL APPEARANCE:  Alert, in no distress, cooperative  ENT:  Mouth and posterior oropharynx normal, moist mucous membranes, [place]  EYES:  EOM, conjunctivae, lids, pupils and irises normal  NECK:  No adenopathy,masses or thyromegaly  RESP:  respiratory effort and palpation of chest normal, lungs clear to auscultation , no respiratory distress  CV:  regular rate and rhythm, no murmur, rub, or gallop, peripheral edema 1+ in BLE  ABDOMEN:  normal bowel sounds, soft, nontender, no hepatosplenomegaly or other masses, no guarding or rebound  M/S:   Ambulates with upwalker. Staff to assist for ADLs, transfers and cares  SKIN:  Inspection of skin and subcutaneous tissue baseline, see photo  NEURO:   Cranial nerves 2-12 are normal tested and grossly at patient's baseline, no purposeful movement in upper and lower extremities  PSYCH:  oriented X 3, affect and mood normal        Most Recent 3 CBC's:  Recent Labs   Lab Test 05/05/25  1102 04/28/25  1213 04/20/25  0637   WBC 15.6* 14.2* 8.3   HGB 10.1* 9.9* 8.6*   MCV 90 88 87    497* 401     Most Recent 3 BMP's:  Recent Labs   Lab Test 05/05/25  1102 04/28/25  1213 04/23/25  0754 04/20/25  0950 04/20/25  0637    139  --   --  143   POTASSIUM 4.7 4.5  --   --  4.2 "   CHLORIDE 103 103  --   --  110*   CO2 22 20*  --   --  23   BUN 44.3* 39.9*  --   --  35.6*   CR 1.54* 1.45*  --   --  1.33*   ANIONGAP 15 16*  --   --  10   KEILY 9.3 9.3  --   --  8.8   GLC 93 103* 126*   < > 113*    < > = values in this interval not displayed.     Most Recent 2 LFT's:  Recent Labs   Lab Test 05/05/25  1102 04/28/25  1213   AST 20 19   ALT 14 11   ALKPHOS 101 91   BILITOTAL 0.3 0.2     Most Recent Hemoglobin A1c:  Recent Labs   Lab Test 04/14/25  1701   A1C 6.8*     Most Recent Urinalysis:  Recent Labs   Lab Test 05/05/25  1800 04/07/25  0945 04/07/25  0935   COLOR Straw   < > Light Yellow   APPEARANCE Clear   < > Slightly Cloudy*   URINEGLC Negative   < > Negative   URINEBILI Negative   < > Negative   URINEKETONE Negative   < > Negative   SG 1.010   < > 1.007   UBLD Moderate*   < > Trace*   URINEPH 6.5   < > 5.5   PROTEIN Negative   < > 10*   UROBILINOGEN  --   --  0.2   NITRITE Negative   < > Negative   LEUKEST Negative   < > Large*   RBCU 84*   < > 2-5*   WBCU 1   < > >100*    < > = values in this interval not displayed.     Most Recent ESR & CRP:  Recent Labs   Lab Test 05/05/25  1102 04/28/25  1213 04/15/25  0613 06/13/23  1820 08/26/19  2331   SED  --   --  61*  --  78*   CRP  --   --   --   --  180.0*   CRPI <3.00   < >  --    < >  --     < > = values in this interval not displayed.     Most Recent Anemia Panel:  Recent Labs   Lab Test 05/05/25  1102 04/14/25  1410 04/07/25  0945 09/04/24  1008 06/15/23  1028   WBC 15.6*   < > 6.5   < > 7.6   HGB 10.1*   < > 9.1*   < > 12.1   HCT 32.9*   < > 30.0*   < > 38.4   MCV 90   < > 88   < > 92      < > 351   < > 345   IRON  --   --  36*   < >  --    IRONSAT  --   --  13*   < >  --    FEB  --   --  277   < >  --    LAURA  --   --  44   < >  --    B12  --   --   --   --  456    < > = values in this interval not displayed.       ASSESSMENT/PLAN:  sepsis 2/2 intraabdominal infection - resolved  Recurrent UTIs on IV ertapenem since 3/9/2024  Recent  Hx of E. Faecalis bacteremia  Sigmoid microperforation  Leukocytosis  Comment: Admitted for generalized weakness likely 2/2 infection. Found to have intraabdominal infection secondary to a sigmoid microperforation. Recent admission in March 2025 for Klebsiella pneumonia UTI and E. Faecalis bacteremia with recommendations from ID for ertapenem 1g q24h until EOT 4/22. RVP negative.-Abdominal CT scan scheduled as directed for 5/5/25 at 1220pm--results as reviewed above--Urine screen assessed twice with negative bacteria.   Plan:  -Follow up with ID as directed. Scheduled for Monday 5/28/25--pending further confirmation of IV plan post CT scan results above  -Follow up with urology as directed. Scheduled for 6/10/25  -Continue vaginal estradiol MWF at HS  -Monitor for worsening s/symptoms of concerns  -Continue IV ertapenem 1gm daily as directed.Estimated until 5/8/25. Pending CT scan results for further ID advisement  -CMP, CBC with diff and CRP due weekly on Mondays until 5/12/25. Fax results to [phone] ATTDENNIS [place]  -CMP, renal panel, and CBC with diff due 5/9/25--including additional lab request per nephrology  -add lactic acid, procalcitonin due 5/9/25     Liver transplant in 2002 2/2 primary biliary cirrhosis  Comment: Chronic. Follows Dr. Ramirez.   Plan:  -Continue PTA sirolimus 1 mg every day  -Reduced prednisone down to 9mg daily for now. (5mg with 4 tablets of 1mg=9mg total)  -Continue PTA ursodiol 250 mg BID  -Follow up with transplant team as directed  -CMP, CBC with diff and CRP due weekly on Mondays until 5/12/25. Fax results to [phone] ATTDENN[facility] [place]  -CMP, renal panel, and CBC with diff due 5/9/25--including additional lab request per nephrology  -add lactic acid, procalcitonin due 5/9/25     Depression   Comment: Chronic. Reports feeling more down and depressed with little interest in doing things. Has a therapist in outpatient setting and has been on anti-depressants  historically.   Plan:  -Monitor mood and behaviors  -Monitor for worsening s/symptoms of concerns  -Monitor for changes in mobility, eating and sleeping patterns  -Continue zoloft 50mg daily.   -Follow up with psych post TCU  -CMP, CBC with diff and CRP due weekly on Mondays until 5/12/25. Fax results to [phone] AUSTYN Sindhu [place]  -CMP, renal panel, and CBC with diff due 5/9/25--including additional lab request per nephrology  -add lactic acid, procalcitonin due 5/9/25     Mild Obstructive Sleep Apnea   Nocturnal Hypoxemia   Comment: Chronic. Sleep study in 2018 showing mild RASHIDA with recommendation to start CPAP. IT does not appear that there was further follow up and not using CPAP.  Plan:  -Monitor sleeping patterns  -Monitor respiratory status  -PCP to consider sleep medicine referral  -CMP, CBC with diff and CRP due weekly on Mondays until 5/12/25. Fax results to [phone] AUSTYN W[place]sterlillian [place]  -CMP, renal panel, and CBC with diff due 5/9/25--including additional lab request per nephrology  -add lactic acid, procalcitonin due 5/9/25     CKD3b w/ moderate proteinuria, at baseline  HTN  CVD/HLD  paroxysmal atrial fibrillation  Comment: Chronic. Baseline Cr. 1.5. History of dialysis at time of liver transplant 23 years ago. Recurrent AKIs and immunosuppressive medication toxicity. Not on SGLT2i 2/2 recurrent UTI. Chart review w/ unclear heart failure history, unconfirmed with EF 60-65% on 3/10/25. BNP 1446 elevated from 1 month ago. Given unclear HF history, trace bilateral LE edema, with some pulm edema noted on imaging, consideration for volume overload. However, without orthopnea or clear cough.   Plan:  -Continue amlodipine 5mg daily. HOLD if SBP<100  -Continue Meclizine 25mg TID PRN  -Continue PTA hydralazine 50mg TID. Hold for SBP <100mmHg.   -Continue PTA metoprolol succinate 25mg every day. HOLD if SBP<100 and/or HR<55  -Monitor BP and HR  -HOLD PTA evolocumab q2week. Follow up with PCP post  TCU to resume. Resume off while on ANTIBIOTIC course-due 5/8 or later  -Continue PTA eztimibe 10mg every day  -HOLD apixaban for 3 days then reduce down to 2.5mg BID ongoing given risks  -CMP, CBC with diff and CRP due weekly on Mondays until 5/12/25. Fax results to [phone] ATTDENNIS [place]  -CMP, renal panel, and CBC with diff due 5/9/25--including additional lab request per nephrology  -add lactic acid, procalcitonin due 5/9/25     T2DM  (A1c 6.8% 4/14/25)  Comment: Chronic. Last A1c 6.8% on 4/14/25.   Plan:  -Continue PTA linagliptin 5mg every day  -Continue PTA gabapentin 300mg at bedtime  -Blood Glucose monitoring back to previous schedule of BID. HOLD off on using freestyle phil 2 sensor while on TCU  -CMP, CBC with diff and CRP due weekly on Mondays until 5/12/25. Fax results to [phone] ATTDENNIS [place]  -CMP, renal panel, and CBC with diff due 5/9/25--including additional lab request per nephrology  -add lactic acid, procalcitonin due 5/9/25     L cheek SCC s/p MOHS 4/8/25  Skin lesion  Comment: Tumor debulk with perineural invasion w/ pending Tumor Board for consideration of radiation therapy. Wound does not look infected. Today 5/7:She does report having skin lesion to left anterior forearm. 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 Will defer to dermatology--next visit 5/13/25.   Plan:  -Monitor for worsening s/symptoms of concerns  -Follow up with dermatology regarding mass noted to left anterior forearm---scheduled for 5/13/25     anemia, chronic  Comment: Chronic. Hgb 9.9 on admit. Stable.  Plan:  -Monitor bleeding risks  -Continue PTA ferrous sulfate daily  -Continue folic acid daily  -CMP, CBC with diff and CRP due weekly on Mondays until 5/12/25. Fax results to [phone] ATTDENNIS [place]  -CMP, renal panel, and CBC with diff due 5/9/25--including additional lab request per nephrology  -add lactic acid, procalcitonin due 5/9/25      Hypothyroidism  Papillary thyroid carcinoma  Chronic. Recent TSH~1.72 on 25  Plan;  -Continue PTA levothyroxine 175mcg qD   -Monitor for worsening s/symptoms of concerns  -CMP, CBC with diff and CRP due weekly on  until 25. Fax results to [phone] ATTDENNIS [place]indhu [place]  -CMP, renal panel, and CBC with diff due 25--including additional lab request per nephrology  -add lactic acid, procalcitonin due 25     Chronic Shoulder Pain  Osteoarthritis   Lower back pain  Comment: Chronic. stable  Plan:  -Monitor pain complaints  -Continue diclofenac gel application to painful areas QID  -Continue Tylenol 1000mg BID   -Continue gabapentin 300mg at HS--only able to tolerate liquid given gelatin capsule allergy  -CMP, CBC with diff and CRP due weekly on  until 25. Fax results to [phone] ATTDENNIS Sindhu [place]  -CMP, renal panel, and CBC with diff due 25--including additional lab request per nephrology  -add lactic acid, procalcitonin due 25     Constipation   Comment: Chronic. S/T polypharmacy  Plan:  -Monitor BM patterns  -Continue beneFiber qd    -Zofran PRN     Epistaxis  Comment: Acute on chronic. She reports occasional nose bleed at times. No episodes while on TCU. She reports history of good relief with pressure and ice application  Plan:  -Monitor bleeding risks  -Monitor for worsening s/symptoms of concerns  -Afrin BID PRN on TCU if warranted     Vaginal bleeding  Comment: Acute on chronic. 3 episodes of painless vaginal bleeding noted since TCU admission. Urine screens x 2 negative. Last menstrual period she reports has been 40 years ago or so. Known family history of endometrial cancer that metastasized to bowel per her reports. She reports her mother had extensive genetic testing for review in EPIC. Reports mother name: Anne Yap MICHAEL 3/9/23 for reference if indicated.  Plan:  -urine was negative for concerns or evidence of blood  -HOLD apixaban for 3 days  then reduce down to 2.5mg BID thereafter for now given risks  -CMP, CBC with diff and CRP due weekly on Mondays until 5/12/25. Fax results to [phone] ATTN Sindhu [place]  -CMP, renal panel, and CBC with diff due 5/9/25--including additional lab request per nephrology  -add lactic acid, procalcitonin due 5/9/25  -Urgent referral for OBGYN referral in place for further testing needs---Scheduled for 5/23/25, pending staff to call to see if any appt is available sooner or if she can be placed on cancellation list if able.      Physical deconditioning  Generalized muscle weakness  Comment: Acute on chronic. Known poor history PTA. [facility] given concerns. Per therapy-Ambulates up to 200ft with upwalker. SBA for most needs and LB cares. 26/30 on the SLUMS, just below normal indicating MNCD  Plan:  -Continue Physical therapy and Occupational therapy  -Recommend cognitive testing on site  -highly recommend residential compliance post TCU     Electronically signed by:  Dr. Manda Flor DNP, APRN, FNP-C, WCS-C, EDS-C     Provider reviewed records from facility, and interpreted most recent imaging/lab work, and vital signs.   Acute and chronic conditions managed by writer. Have been reviewed during today's exam

## 2025-05-07 ENCOUNTER — LAB REQUISITION (OUTPATIENT)
Dept: LAB | Facility: CLINIC | Age: 75
End: 2025-05-07
Payer: MEDICARE

## 2025-05-07 ENCOUNTER — TRANSITIONAL CARE UNIT VISIT (OUTPATIENT)
Dept: GERIATRICS | Facility: CLINIC | Age: 75
End: 2025-05-07
Payer: MEDICARE

## 2025-05-07 VITALS
RESPIRATION RATE: 18 BRPM | BODY MASS INDEX: 28.41 KG/M2 | DIASTOLIC BLOOD PRESSURE: 75 MMHG | SYSTOLIC BLOOD PRESSURE: 125 MMHG | OXYGEN SATURATION: 96 % | HEART RATE: 65 BPM | WEIGHT: 181 LBS | TEMPERATURE: 97.5 F | HEIGHT: 67 IN

## 2025-05-07 DIAGNOSIS — L21.9 SEBORRHEIC DERMATITIS: ICD-10-CM

## 2025-05-07 DIAGNOSIS — M54.50 BILATERAL LOW BACK PAIN WITHOUT SCIATICA, UNSPECIFIED CHRONICITY: ICD-10-CM

## 2025-05-07 DIAGNOSIS — R04.0 EPISTAXIS: ICD-10-CM

## 2025-05-07 DIAGNOSIS — N18.32 STAGE 3B CHRONIC KIDNEY DISEASE (H): Primary | ICD-10-CM

## 2025-05-07 DIAGNOSIS — N39.0 URINARY TRACT INFECTION: ICD-10-CM

## 2025-05-07 DIAGNOSIS — R42 DIZZINESS: ICD-10-CM

## 2025-05-07 DIAGNOSIS — L98.9 SKIN LESION: ICD-10-CM

## 2025-05-07 DIAGNOSIS — N18.30 CHRONIC KIDNEY DISEASE, STAGE 3 UNSPECIFIED (H): ICD-10-CM

## 2025-05-07 DIAGNOSIS — F32.A DEPRESSION, UNSPECIFIED DEPRESSION TYPE: ICD-10-CM

## 2025-05-07 DIAGNOSIS — I10 HYPERTENSION GOAL BP (BLOOD PRESSURE) < 140/80: ICD-10-CM

## 2025-05-07 DIAGNOSIS — N18.32 TYPE 2 DIABETES MELLITUS WITH STAGE 3B CHRONIC KIDNEY DISEASE, WITHOUT LONG-TERM CURRENT USE OF INSULIN (H): ICD-10-CM

## 2025-05-07 DIAGNOSIS — M81.0 AGE-RELATED OSTEOPOROSIS WITHOUT CURRENT PATHOLOGICAL FRACTURE: ICD-10-CM

## 2025-05-07 DIAGNOSIS — R53.81 PHYSICAL DECONDITIONING: Primary | ICD-10-CM

## 2025-05-07 DIAGNOSIS — G47.33 OSA (OBSTRUCTIVE SLEEP APNEA): ICD-10-CM

## 2025-05-07 DIAGNOSIS — E11.22 TYPE 2 DIABETES MELLITUS WITH STAGE 3B CHRONIC KIDNEY DISEASE, WITHOUT LONG-TERM CURRENT USE OF INSULIN (H): ICD-10-CM

## 2025-05-07 DIAGNOSIS — G89.29 CHRONIC SHOULDER PAIN, UNSPECIFIED LATERALITY: ICD-10-CM

## 2025-05-07 DIAGNOSIS — C44.329 SQUAMOUS CELL CANCER OF SKIN OF LEFT CHEEK: ICD-10-CM

## 2025-05-07 DIAGNOSIS — I10 BENIGN ESSENTIAL HYPERTENSION: ICD-10-CM

## 2025-05-07 DIAGNOSIS — D84.9 IMMUNOCOMPROMISED: ICD-10-CM

## 2025-05-07 DIAGNOSIS — C73 PAPILLARY THYROID CARCINOMA (H): ICD-10-CM

## 2025-05-07 DIAGNOSIS — Z87.898 HX OF BACTEREMIA: ICD-10-CM

## 2025-05-07 DIAGNOSIS — D64.9 ANEMIA, UNSPECIFIED: ICD-10-CM

## 2025-05-07 DIAGNOSIS — R79.89 ABNORMAL LIVER FUNCTION TESTS: ICD-10-CM

## 2025-05-07 DIAGNOSIS — E03.9 HYPOTHYROIDISM, UNSPECIFIED TYPE: ICD-10-CM

## 2025-05-07 DIAGNOSIS — G47.34 NOCTURNAL HYPOXEMIA: ICD-10-CM

## 2025-05-07 DIAGNOSIS — Z86.19 HX OF SEPSIS: ICD-10-CM

## 2025-05-07 DIAGNOSIS — Z85.828 S/P MOHS SURGERY FOR BASAL CELL CARCINOMA: ICD-10-CM

## 2025-05-07 DIAGNOSIS — B99.9 INTRA-ABDOMINAL INFECTION: ICD-10-CM

## 2025-05-07 DIAGNOSIS — N18.32 STAGE 3B CHRONIC KIDNEY DISEASE (H): ICD-10-CM

## 2025-05-07 DIAGNOSIS — D64.9 CHRONIC ANEMIA: ICD-10-CM

## 2025-05-07 DIAGNOSIS — Z98.890 S/P MOHS SURGERY FOR BASAL CELL CARCINOMA: ICD-10-CM

## 2025-05-07 DIAGNOSIS — N93.9 VAGINAL BLEEDING: ICD-10-CM

## 2025-05-07 DIAGNOSIS — I48.0 PAF (PAROXYSMAL ATRIAL FIBRILLATION) (H): ICD-10-CM

## 2025-05-07 DIAGNOSIS — K59.01 SLOW TRANSIT CONSTIPATION: ICD-10-CM

## 2025-05-07 DIAGNOSIS — Z94.4 STATUS POST LIVER TRANSPLANTATION (H): ICD-10-CM

## 2025-05-07 DIAGNOSIS — M62.81 GENERALIZED MUSCLE WEAKNESS: ICD-10-CM

## 2025-05-07 DIAGNOSIS — I50.32 CHRONIC DIASTOLIC HEART FAILURE (H): ICD-10-CM

## 2025-05-07 DIAGNOSIS — E78.5 HYPERLIPIDEMIA LDL GOAL <70: ICD-10-CM

## 2025-05-07 DIAGNOSIS — M25.519 CHRONIC SHOULDER PAIN, UNSPECIFIED LATERALITY: ICD-10-CM

## 2025-05-07 LAB
ALBUMIN MFR UR ELPH: 15.4 MG/DL
CREAT UR-MCNC: 20.2 MG/DL
CREAT UR-MCNC: 20.6 MG/DL
MICROALBUMIN UR-MCNC: 63.8 MG/L
MICROALBUMIN/CREAT UR: 315.84 MG/G CR (ref 0–25)
PROT/CREAT 24H UR: 0.75 MG/MG CR (ref 0–0.2)

## 2025-05-07 PROCEDURE — 99309 SBSQ NF CARE MODERATE MDM 30: CPT | Performed by: NURSE PRACTITIONER

## 2025-05-07 NOTE — LETTER
5/7/2025      Luz Thompson  [address]  [facility] [address]        [facility] GERIATRICS    Chief Complaint   Patient presents with     RECHECK     HPI:  Luz Thompson is a 75 year old  (1950), who is being seen today for an episodic care visit at: [facility] ([facility])([facility]) [[id number]]. Today's concern is: The primary encounter diagnosis was Physical deconditioning. 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, S/P Mohs surgery for basal cell carcinoma, Hyperlipidemia LDL goal <70, Stage 3b chronic kidney disease (H), Type 2 diabetes mellitus with stage 3b chronic kidney disease, without long-term current use of insulin (H), Depression, unspecified depression type, PAF (paroxysmal atrial fibrillation) (H), RASHIDA (obstructive sleep apnea), Chronic diastolic heart failure (H), Hypertension goal BP (blood pressure) < 140/80, Abnormal liver function tests, Status post liver transplantation (H), Nocturnal hypoxemia, Intra-abdominal infection, Immunocompromised, Hx of sepsis, Hx of bacteremia, Seborrheic dermatitis, Papillary thyroid carcinoma (H), Vaginal bleeding, Epistaxis, Benign essential hypertension, Dizziness, and Skin lesion were also pertinent to this visit.    Met with patient who was found lying in bed prior to lunch. She denies any chest pain, palpitations, shortness of breath, [place], lightheadedness, dizziness, or cough. Denies any abdominal discomfort. Denies N&V. Denies B&B concerns. Denies dysuria or frequency. Denies loose or constipation. Denies any further vaginal bleeding since last visit. Appetite good. Sleeping well. No pain complaints. She does report  "having skin lesion to left anterior forearm. 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 Will defer to dermatology--next visit 5/13/25.     BP Readings from Last 3 Encounters:   05/07/25 125/75   05/05/25 129/65   05/02/25 (!) 142/57     Wt Readings from Last 5 Encounters:   05/07/25 82.1 kg (181 lb)   05/05/25 82.1 kg (181 lb)   05/02/25 82.2 kg (181 lb 3.2 oz)   04/30/25 82.1 kg (181 lb)   04/28/25 82.1 kg (181 lb)     Allergies, and PMH/PSH reviewed in EPIC today.  REVIEW OF SYSTEMS:  4 point ROS including Respiratory, CV, GI and , other than that noted in the HPI,  is negative    Objective:   /75   Pulse 65   Temp 97.5  F (36.4  C)   Resp 18   Ht 1.702 m (5' 7\")   Wt 82.1 kg (181 lb)   LMP 06/01/1988 (Approximate)   SpO2 96%   BMI 28.35 kg/m    GENERAL APPEARANCE:  Alert, in no distress, cooperative  ENT:  Mouth and posterior oropharynx normal, moist mucous membranes, [place]  EYES:  EOM, conjunctivae, lids, pupils and irises normal  NECK:  No adenopathy,masses or thyromegaly  RESP:  respiratory effort and palpation of chest normal, lungs clear to auscultation , no respiratory distress  CV:  regular rate and rhythm, no murmur, rub, or gallop, peripheral edema 1+ in BLE  ABDOMEN:  normal bowel sounds, soft, nontender, no hepatosplenomegaly or other masses, no guarding or rebound  M/S:   Ambulates with upwalker. Staff to assist for ADLs, transfers and cares  SKIN:  Inspection of skin and subcutaneous tissue baseline, see photo  NEURO:   Cranial nerves 2-12 are normal tested and grossly at patient's baseline, no purposeful movement in upper and lower extremities  PSYCH:  oriented X 3, affect and mood normal        Most Recent 3 CBC's:  Recent Labs   Lab Test 05/05/25  1102 04/28/25  1213 04/20/25  0637   WBC 15.6* 14.2* 8.3   HGB 10.1* 9.9* 8.6*   MCV 90 88 87    497* 401     Most Recent 3 BMP's:  Recent Labs   Lab Test 05/05/25  1102 04/28/25  1213 04/23/25  0754 " 04/20/25  0950 04/20/25  0637    139  --   --  143   POTASSIUM 4.7 4.5  --   --  4.2   CHLORIDE 103 103  --   --  110*   CO2 22 20*  --   --  23   BUN 44.3* 39.9*  --   --  35.6*   CR 1.54* 1.45*  --   --  1.33*   ANIONGAP 15 16*  --   --  10   KEILY 9.3 9.3  --   --  8.8   GLC 93 103* 126*   < > 113*    < > = values in this interval not displayed.     Most Recent 2 LFT's:  Recent Labs   Lab Test 05/05/25  1102 04/28/25  1213   AST 20 19   ALT 14 11   ALKPHOS 101 91   BILITOTAL 0.3 0.2     Most Recent Hemoglobin A1c:  Recent Labs   Lab Test 04/14/25  1701   A1C 6.8*     Most Recent Urinalysis:  Recent Labs   Lab Test 05/05/25  1800 04/07/25  0945 04/07/25  0935   COLOR Straw   < > Light Yellow   APPEARANCE Clear   < > Slightly Cloudy*   URINEGLC Negative   < > Negative   URINEBILI Negative   < > Negative   URINEKETONE Negative   < > Negative   SG 1.010   < > 1.007   UBLD Moderate*   < > Trace*   URINEPH 6.5   < > 5.5   PROTEIN Negative   < > 10*   UROBILINOGEN  --   --  0.2   NITRITE Negative   < > Negative   LEUKEST Negative   < > Large*   RBCU 84*   < > 2-5*   WBCU 1   < > >100*    < > = values in this interval not displayed.     Most Recent ESR & CRP:  Recent Labs   Lab Test 05/05/25  1102 04/28/25  1213 04/15/25  0613 06/13/23  1820 08/26/19  2331   SED  --   --  61*  --  78*   CRP  --   --   --   --  180.0*   CRPI <3.00   < >  --    < >  --     < > = values in this interval not displayed.     Most Recent Anemia Panel:  Recent Labs   Lab Test 05/05/25  1102 04/14/25  1410 04/07/25  0945 09/04/24  1008 06/15/23  1028   WBC 15.6*   < > 6.5   < > 7.6   HGB 10.1*   < > 9.1*   < > 12.1   HCT 32.9*   < > 30.0*   < > 38.4   MCV 90   < > 88   < > 92      < > 351   < > 345   IRON  --   --  36*   < >  --    IRONSAT  --   --  13*   < >  --    FEB  --   --  277   < >  --    LAURA  --   --  44   < >  --    B12  --   --   --   --  456    < > = values in this interval not displayed.       ASSESSMENT/PLAN:  sepsis 2/2  intraabdominal infection - resolved  Recurrent UTIs on IV ertapenem since 3/9/2024  Recent Hx of E. Faecalis bacteremia  Sigmoid microperforation  Leukocytosis  Comment: Admitted for generalized weakness likely 2/2 infection. Found to have intraabdominal infection secondary to a sigmoid microperforation. Recent admission in March 2025 for Klebsiella pneumonia UTI and E. Faecalis bacteremia with recommendations from ID for ertapenem 1g q24h until EOT 4/22. RVP negative.-Abdominal CT scan scheduled as directed for 5/5/25 at 1220pm--results as reviewed above--Urine screen assessed twice with negative bacteria.   Plan:  -Follow up with ID as directed. Scheduled for Monday 5/28/25--pending further confirmation of IV plan post CT scan results above  -Follow up with urology as directed. Scheduled for 6/10/25  -Continue vaginal estradiol MWF at HS  -Monitor for worsening s/symptoms of concerns  -Continue IV ertapenem 1gm daily as directed.Estimated until 5/8/25. Pending CT scan results for further ID advisement  -CMP, CBC with diff and CRP due weekly on Mondays until 5/12/25. 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 Follows Dr. Ramirez.   Plan:  -Continue PTA sirolimus 1 mg every day  -Reduced prednisone down to 9mg daily for now. (5mg with 4 tablets of 1mg=9mg total)  -Continue PTA ursodiol 250 mg BID  -Follow up with transplant team as directed  -CMP, CBC with diff and CRP due weekly on Mondays until 5/12/25. Fax results to [phone] ATTDENN[facility] [place]  -CMP, renal panel, and CBC with diff due 5/9/25--including additional lab request per nephrology  -add lactic acid, procalcitonin due 5/9/25     Depression   Comment: Chronic. Reports feeling more down and depressed with little interest in  doing things. Has a therapist in outpatient setting and has been on anti-depressants historically.   Plan:  -Monitor mood and behaviors  -Monitor for worsening s/symptoms of concerns  -Monitor for changes in mobility, eating and sleeping patterns  -Continue zoloft 50mg daily.   -Follow up with psych post TCU  -CMP, CBC with diff and CRP due weekly on Mondays until 5/12/25. Fax results to [phone] [facility] [place]  -CMP, renal panel, and CBC with diff due 5/9/25--including additional lab request per nephrology  -add lactic acid, procalcitonin due 5/9/25     Mild Obstructive Sleep Apnea   Nocturnal Hypoxemia   Comment: Chronic. Sleep study in 2018 showing mild RASHIDA with recommendation to start CPAP. IT does not appear that there was further follow up and not using CPAP.  Plan:  -Monitor sleeping patterns  -Monitor respiratory status  -PCP to consider sleep medicine referral  -CMP, CBC with diff and CRP due weekly on Mondays until 5/12/25. Fax results to [phone] AUST[facility] [place]  -CMP, renal panel, and CBC with diff due 5/9/25--including additional lab request per nephrology  -add lactic acid, procalcitonin due 5/9/25     CKD3b w/ moderate proteinuria, at baseline  HTN  CVD/HLD  paroxysmal atrial fibrillation  Comment: Chronic. Baseline Cr. 1.5. History of dialysis at time of liver transplant 23 years ago. Recurrent AKIs and immunosuppressive medication toxicity. Not on SGLT2i 2/2 recurrent UTI. Chart review w/ unclear heart failure history, unconfirmed with EF 60-65% on 3/10/25. BNP 1446 elevated from 1 month ago. Given unclear HF history, trace bilateral LE edema, with some pulm edema noted on imaging, consideration for volume overload. However, without orthopnea or clear cough.   Plan:  -Continue amlodipine 5mg daily. HOLD if SBP<100  -Continue Meclizine 25mg TID PRN  -Continue PTA hydralazine 50mg TID. Hold for SBP <100mmHg.   -Continue PTA metoprolol succinate 25mg every day. HOLD if SBP<100  and/or HR<55  -Monitor BP and HR  -HOLD PTA evolocumab q2week. Follow up with PCP post TCU to resume. Resume off while on ANTIBIOTIC course-due 5/8 or later  -Continue PTA eztimibe 10mg every day  -HOLD apixaban for 3 days then reduce down to 2.5mg BID ongoing given risks  -CMP, CBC with diff and CRP due weekly on Mondays until 5/12/25. Fax results to [phone] [facility] [place]  -CMP, renal panel, and CBC with diff due 5/9/25--including additional lab request per nephrology  -add lactic acid, procalcitonin due 5/9/25     T2DM  (A1c 6.8% 4/14/25)  Comment: Chronic. Last A1c 6.8% on 4/14/25.   Plan:  -Continue PTA linagliptin 5mg every day  -Continue PTA gabapentin 300mg at bedtime  -Blood Glucose monitoring back to previous schedule of BID. HOLD off on using freestyle phil 2 sensor while on TCU  -CMP, CBC with diff and CRP due weekly on Mondays until 5/12/25. Fax results to [phone] [facility] [place]  -CMP, renal panel, and CBC with diff due 5/9/25--including additional lab request per nephrology  -add lactic acid, procalcitonin due 5/9/25     L cheek SCC s/p MOHS 4/8/25  Skin lesion  Comment: Tumor debulk with perineural invasion w/ pending Tumor Board for consideration of radiation therapy. Wound does not look infected. Today 5/7:She does report having skin lesion to left anterior forearm. 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 Will defer to dermatology--next visit 5/13/25.   Plan:  -Monitor for worsening s/symptoms of concerns  -Follow up with dermatology regarding mass noted to left anterior forearm---scheduled for 5/13/25     anemia, chronic  Comment: Chronic. Hgb 9.9 on admit. Stable.  Plan:  -Monitor bleeding risks  -Continue PTA ferrous sulfate daily  -Continue folic acid daily  -CMP, CBC with diff and CRP due weekly on Mondays until 5/12/25. Fax results to [phone] ATTN [facility] [place]  -CMP, renal panel, and CBC with diff due 5/9/25--including  additional lab request per nephrology  -add lactic acid, procalcitonin due 25     Hypothyroidism  Papillary thyroid carcinoma  Chronic. Recent TSH~1.72 on 25  Plan;  -Continue PTA levothyroxine 175mcg qD   -Monitor for worsening s/symptoms of concerns  -CMP, CBC with diff and CRP due weekly on  until 25. Fax results to [phone] ATTN Sindhu [place]  -CMP, renal panel, and CBC with diff due 25--including additional lab request per nephrology  -add lactic acid, procalcitonin due 25     Chronic Shoulder Pain  Osteoarthritis   Lower back pain  Comment: Chronic. stable  Plan:  -Monitor pain complaints  -Continue diclofenac gel application to painful areas QID  -Continue Tylenol 1000mg BID   -Continue gabapentin 300mg at HS--only able to tolerate liquid given gelatin capsule allergy  -CMP, CBC with diff and CRP due weekly on  until 25. Fax results to [phone] ATTDENNIS [place]  -CMP, renal panel, and CBC with diff due 25--including additional lab request per nephrology  -add lactic acid, procalcitonin due 25     Constipation   Comment: Chronic. S/T polypharmacy  Plan:  -Monitor BM patterns  -Continue beneFiber qd    -Zofran PRN     Epistaxis  Comment: Acute on chronic. She reports occasional nose bleed at times. No episodes while on TCU. She reports history of good relief with pressure and ice application  Plan:  -Monitor bleeding risks  -Monitor for worsening s/symptoms of concerns  -Afrin BID PRN on TCU if warranted     Vaginal bleeding  Comment: Acute on chronic. 3 episodes of painless vaginal bleeding noted since TCU admission. Urine screens x 2 negative. Last menstrual period she reports has been 40 years ago or so. Known family history of endometrial cancer that metastasized to bowel per her reports. She reports her mother had extensive genetic testing for review in EPIC. Reports mother name: Anne Yap MICHAEL 3/9/23 for reference if  indicated.  Plan:  -urine was negative for concerns or evidence of blood  -HOLD apixaban for 3 days then reduce down to 2.5mg BID thereafter for now given risks  -CMP, CBC with diff and CRP due weekly on Mondays until 5/12/25. Fax results to [phone] ATTN Sindhu [place]  -CMP, renal panel, and CBC with diff due 5/9/25--including additional lab request per nephrology  -add lactic acid, procalcitonin due 5/9/25  -Urgent referral for OBGYN referral in place for further testing needs---Scheduled for 5/23/25, pending staff to call to see if any appt is available sooner or if she can be placed on cancellation list if able.      Physical deconditioning  Generalized muscle weakness  Comment: Acute on chronic. Known poor history PTA. [facility] given concerns. Per therapy-Ambulates up to 200ft with upwalker. SBA for most needs and LB cares. 26/30 on the SLUMS, just below normal indicating MNCD  Plan:  -Continue Physical therapy and Occupational therapy  -Recommend cognitive testing on site  -highly recommend penitentiary compliance post TCU     Electronically signed by:  Dr. Manda Flor DNP, APRN, FNP-C, WCS-C, EDS-C     Provider reviewed records from facility, and interpreted most recent imaging/lab work, and vital signs.   Acute and chronic conditions managed by writer. Have been reviewed during today's exam         Sincerely,        Manda Flor, APRN CNP    Electronically signed

## 2025-05-08 ENCOUNTER — LAB REQUISITION (OUTPATIENT)
Dept: LAB | Facility: CLINIC | Age: 75
End: 2025-05-08
Payer: MEDICARE

## 2025-05-08 ENCOUNTER — TELEPHONE (OUTPATIENT)
Dept: INFECTIOUS DISEASES | Facility: CLINIC | Age: 75
End: 2025-05-08
Payer: MEDICARE

## 2025-05-08 DIAGNOSIS — I10 ESSENTIAL (PRIMARY) HYPERTENSION: ICD-10-CM

## 2025-05-08 DIAGNOSIS — N10 PYELONEPHRITIS, ACUTE: ICD-10-CM

## 2025-05-08 DIAGNOSIS — K57.20 DIVERTICULITIS OF LARGE INTESTINE WITH PERFORATION AND ABSCESS WITHOUT BLEEDING: Primary | ICD-10-CM

## 2025-05-08 DIAGNOSIS — D64.9 ANEMIA, UNSPECIFIED: ICD-10-CM

## 2025-05-08 DIAGNOSIS — N39.0 URINARY TRACT INFECTION: ICD-10-CM

## 2025-05-08 DIAGNOSIS — N18.30 CHRONIC KIDNEY DISEASE, STAGE 3 UNSPECIFIED (H): ICD-10-CM

## 2025-05-08 DIAGNOSIS — B99.9 INTRA-ABDOMINAL INFECTION: Primary | ICD-10-CM

## 2025-05-08 DIAGNOSIS — D72.828 OTHER ELEVATED WHITE BLOOD CELL COUNT: ICD-10-CM

## 2025-05-08 DIAGNOSIS — I48.20 CHRONIC ATRIAL FIBRILLATION, UNSPECIFIED (H): ICD-10-CM

## 2025-05-08 RX ORDER — TIGECYCLINE 50 MG/10ML
50 INJECTION, POWDER, LYOPHILIZED, FOR SOLUTION INTRAVENOUS EVERY 12 HOURS
Qty: 300 ML | Refills: 0 | Status: SHIPPED | OUTPATIENT
Start: 2025-05-08

## 2025-05-08 NOTE — TELEPHONE ENCOUNTER
Received new orders per ID due to worsening CT scan.  Per ID: I think she needs to see surgery again as abxs alone are not working and the new pneumobilia is concerning with a curvilinear hypodensity in the inferior right lobe since 3/2025.     1. Stop ertapenem  2. Tigecycline 50 mg iv bid with no end date  3. Continue weekly labs on Mondays for CMP, CBC with diff and CRP.   3. Repeat CT abdomen/pelvis without contrast in 2 weeks.   4. Pending further coordination of appt schedule with colorectal surgery team

## 2025-05-08 NOTE — TELEPHONE ENCOUNTER
Follow up call to TCU regarding update to treatment plan.  Noted that NP Manda Flor has updated the team /orders at the TCU however this RN also faxed orders. Stopping IV Ertapenem, starting IV Tigecycline. Change to weekly labs.   TCU will work to get CT scheduled, provided contact number.    TCU noted that NP will  update patient. This RN will reply to joey abreu.    Confirmed with TCU that they received fax.  Provided contact number to this RN for further questions related to ID.      PRIMITIVO Ross, RN  RN Care Coordinator [facility]      ----- Message from Manda Flor sent at 5/8/2025  8:42 AM CDT -----  Regarding: RE: update  Staff at [facility] updated via telephone with these new orders just now. If you could also fax orders to TCU for their reference as wellTCU fax [phone]-Manda  ----- Message -----  From: Linda Tilley, RN  Sent: 5/8/2025   8:30 AM CDT  To: Thomas Silva MD; Natividad Lundberg MD; #  Subject: RE: update                                       Good Morning-Manda- will you be taking care of adding the IV medication at your facility or do I need to fax orders?I will send orders to the nursing team for the lab work and work to get her CT scan scheduled and update your team so they can  arrange transportation-Thank PRIMITIVO Orellana, RNRN Care Coordinator [facility]  ----- Message -----  From: Natividad Lundberg MD  Sent: 5/7/2025   9:38 PM CDT  To: Thomas Silva MD; Natividad Lundberg MD; #  Subject: RE: update                                       Hi, Thank you for contacting me. It is concerning that WBC is increasing and the CT is showing an increase in perforated diverticular collection despite 3 weeks of abxs. Also the Ct is showing new pneumobilia. Not willem eif the vaginal bleeding could be related to this. Lets stop ertapenem and do tigecycline 50 mg iv bid (with weekly CBC, CMP) instead (due to prior enterococcus bacteremia)  and repeat CT abdomen/pelvis without contrast in 2 weeks. I think she needs to see surgery again as abxs alone are not working and the new pneumobilia is concerning with a curvilinear hypodensity in the inferior right lobe since 3/2025. Copying our surgery colleagues to this message to get their input. Saeid  ----- Message -----  From: Manda Flor APRN CNP  Sent: 5/5/2025   5:13 PM CDT  To: Natividad Lundberg MD  Subject: update                                           Good evening,     I know you are the following ID provider for this mutual patient. I am currently one of the rounding providers at the TCU where she is at right now. Just wanted you be to informed that since being on TCU she has been having 2 episodes of painless small vaginal bleeding. I am making some adjustments to her apixaban medication today. Family history of endometrial cancer, so I did refer her to OBGYN with appt on 5/23 so far. We did do a urine screen last week which was negative. Ongoing WBC elevation. I am having staff recheck another urine today. Results should be in EPIC once completed.     I know you were planning to review her CT scan which was completed today and appears fluid collection to have slightly increased in size.     If you have any changes or orders, please let me know and I can make sure TCU staff are aware of any changes necessary.     Thanks    -Manda flor DNP

## 2025-05-09 ENCOUNTER — LAB REQUISITION (OUTPATIENT)
Dept: LAB | Facility: CLINIC | Age: 75
End: 2025-05-09
Payer: MEDICARE

## 2025-05-09 DIAGNOSIS — A04.9 BACTERIAL INTESTINAL INFECTION, UNSPECIFIED: ICD-10-CM

## 2025-05-09 DIAGNOSIS — D64.9 ANEMIA, UNSPECIFIED: ICD-10-CM

## 2025-05-09 DIAGNOSIS — N18.30 CHRONIC KIDNEY DISEASE, STAGE 3 UNSPECIFIED (H): ICD-10-CM

## 2025-05-09 DIAGNOSIS — I10 ESSENTIAL (PRIMARY) HYPERTENSION: ICD-10-CM

## 2025-05-09 DIAGNOSIS — K65.1 PERITONEAL ABSCESS (H): ICD-10-CM

## 2025-05-09 NOTE — PROGRESS NOTES
He had [facility] GERIATRICS  INITIAL VISIT NOTE  May 12, 2025      PRIMARY CARE PROVIDER AND CLINIC:  Natividad Lundberg [address] / [facility] [id number]    [facility] Medical Record Number:  [id number]  Place of Service where encounter took place:  [facility]) [[id number]]    Chief Complaint   Patient presents with    Hospital F/U       HPI:    Luz Thompson is a 75 year old  (1950) female seen today at [facility]. Medical history is notable for PBC s/p liver transplant (2002), CAD, HTN, a-fib, DM,CVA, CKD, anemia, SCC s/p MOHS of left cheek (4/8/25) and recurrent ESBL UTIs on ertapenem since March. She was hospitalized at [facility] from 4/14/2025 to 4/23/2025 where she presented with generalized weakness and was found to have an d.  Colorectal surgery was consulted and recommended medical management.  Psychiatry consulted for depression and she is new on sertraline. She was admitted to this facility for  rehab, medical management, and nursing care.      History obtained from: facility chart records, facility staff, patient report, [facility] chart review, and [facility].      At TCU, ID changed ertapenem to tigecycline on 5/8/25 due to increasing WBC and imaging with pneumobilia with plan for repeat CT in 2 weeks.     Today, Ms. Thompson is seen in her room sitting up in bed.  She is wondering if she could go home and pay for infusions at home, assuming PT clears her.  She is breast frustration over the recent care conference where PT stated she should consider help with medication set up and get a driving eval.  Otherwise, she is doing well.  No aches or pains.  I asked if she feels she has gained any water weight as it appears she has had a weight gain last week - she has not noticed any edema. No acute concerns per nursing.  She is working with therapies with goal to return home. [facility]  26/30    CODE STATUS: CPR/Full code     ALLERGIES:  Allergies   Allergen Reactions    Fluconazole Hives and Itching     Full body hives  **Intradermal skin testing negative**  [See intradermal skin testing results from 8/2/2019]    Mycophenolate Diarrhea and Nausea and Vomiting     Patient stated it was chronic and lasted months      Penicillins Anaphylaxis, Hives, Itching and Rash     **Intradermal skin testing negative**  [See intradermal skin testing results from 8/2/2019]      Simvastatin Muscle Pain (Myalgia)     severe  Other reaction(s): Myalgia caused by statin    Methotrexate Other (See Comments)     Other reaction(s): Sore  Sores in mouth, esophagus, and stomach.       Morphine And Codeine Itching and Other (See Comments)     Psych disturbance  Other reaction(s): Confusion, Mood alteration    Quinolones Anxiety, Dizziness, Headache, Other (See Comments), Palpitations and Unknown     Other reaction(s): Hyperactive behavior, Lightheadedness, Mood alteration    Dizzy, light headed    Dizziness, shaky, and jumpy    Capsules, Empty Gelatin [Gelatin]     Carvedilol Diarrhea     Per pt - severe diarrhea and LE swelling  + tolerating Toprol    Lansoprazole Diarrhea    Strawberry Extract     Azithromycin Itching     [See intradermal skin testing results from 8/2/2019]    Bactrim [Sulfamethoxazole-Trimethoprim] Other (See Comments)     Numb mouth, tingling lips (treated with anti-histamines)    Cephalosporins Itching     [See intradermal skin testing results from 8/2/2019]    Ciprofloxacin Hcl Other (See Comments) and Dizziness     Insomnia, mood lability, Irregular heart beat         Doxycycline Itching and Unknown     [See intradermal skin testing results from 8/2/2019]    Lisinopril Cough    Omeprazole Itching    Tolectin [Nsaids] Rash    Tolmetin Rash and Itching    Tramadol Rash, Hives and Itching       PAST MEDICAL HISTORY:   Past Medical History:   Diagnosis Date    Anemia of chronic disease 10/17/2011     "Anxiety     CKD (chronic kidney disease) stage 3, GFR 30-59 ml/min (H) 04/04/2012    Coccidioidomycosis, 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 Gentry Ramirez 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       PAST SURGICAL HISTORY:   Past Surgical History:   Procedure Laterality Date    APPENDECTOMY  1961    CATARACT IOL, RT/LT      RE12/19/2013, LE12/10/2013 - Toric lenses    CHOLECYSTECTOMY  1991    COLONOSCOPY  03/10/2014    Procedure: COLONOSCOPY;;  Surgeon: Gentry Ramirez MD;  Location:  GI    CYSTOSCOPY      ear drum repair      ENDOBRONCHIAL ULTRASOUND FLEXIBLE N/A 09/29/2017    Procedure: ENDOBRONCHIAL ULTRASOUND FLEXIBLE;  Flexible Bronchoscopy, Endobronchial Ultrasound, Transbronchial Needle Aspiration ;  Surgeon: Eden Clinton MD;  Location:  OR    ENDOSCOPIC RETROGRADE CHOLANGIOPANCREATOGRAM  09/19/2013    Procedure: ENDOSCOPIC RETROGRADE " CHOLANGIOPANCREATOGRAM;  Endoscopic Retrograde Cholangiopancreatogram with single balloon enteroscopy, ballon sweep of bile duct;  Surgeon: Brett Membreno MD;  Location: [facility] OR    HC KNEE SCOPE,MED/LAT MENISECTOMY Right 08/10/2012    partial medial menisectomy only    KNEE SURGERY  1966    R knee    PICC INSERTION  2013    4fr SL PASV PICC, 40cm (1cm external) in the R basilic vein w/ tip in the low SVC    PICC INSERTION  2014    5 fr DL BioFlo Navilyst PICC, 46 cm (3 cm external) in the L basilic vein w/ tip in the SVC RA junction.    THYROIDECTOMY  2010    TRANSPLANT LIVER RECIPIENT LIVING UNRELATED  2002       FAMILY HISTORY:   Family History   Problem Relation Age of Onset    Hypertension Mother     Endometrial Cancer Mother     Hyperlipidemia Mother     Prostate Cancer Father     Macular Degeneration Father     Cancer - colorectal Maternal Grandmother         in her 80's, has surgery and removal of part of kidney,  at age 98    Heart Disease Maternal Grandfather          at 98    Glaucoma Maternal Grandfather     Cerebrovascular Disease Paternal Grandmother         in her 80's    Hypertension Paternal Grandmother     Heart Disease Paternal Grandfather         MI    Alzheimer Disease Paternal Grandfather     Allergies Son     Neurologic Disorder Daughter         Migraines    Breast Cancer Other     Anesthesia Reaction No family hx of     Crohn's Disease No family hx of     Ulcerative Colitis No family hx of     Melanoma No family hx of     Skin Cancer No family hx of      SOCIAL HISTORY:   Patient's living condition: alone in an independent living facility     MEDICATIONS:  Post Discharge Medication Reconciliation Status: discharge medications reconciled and changed, per note/orders.  Current Outpatient Medications   Medication Sig Dispense Refill    acetaminophen (TYLENOL) 500 MG tablet Take 1,000 mg by mouth 2 times daily. During 25 med recc pt states take BID everyday       amLODIPine (NORVASC) 5 MG tablet Take 1 tablet (5 mg) by mouth daily. HOLD if SBP<100 30 tablet 2    apixaban ANTICOAGULANT (ELIQUIS) 2.5 MG tablet Take 1 tablet (2.5 mg) by mouth 2 times daily. 60 tablet 0    calcitRIOL (ROCALTROL) 0.25 MCG capsule Take 1 capsule (0.25 mcg) by mouth daily. 90 capsule 1    D-MANNOSE PO Take 1 Scoop by mouth 2 times daily.      diclofenac (VOLTAREN) 1 % topical gel Apply 4 g topically 4 times daily.      diphenhydrAMINE (BENADRYL) 25 MG tablet Take 25 mg by mouth every 6 hours as needed for itching or allergies.      EPINEPHrine (ANY BX GENERIC EQUIV) 0.3 MG/0.3ML injection 2-pack Inject 0.3 mLs (0.3 mg) into the muscle as needed for anaphylaxis. May repeat one time in 5-15 minutes if response to initial dose is inadequate. 2 each 1    estradiol (ESTRACE) 0.1 MG/GM vaginal cream Place vaginally three times a week.      evolocumab (REPATHA SURECLICK) 140 MG/ML prefilled autoinjector Inject 1 mL (140 mg) subcutaneously every 14 days. . Please have fasting labs drawn for further refills. 6 mL 3    ezetimibe (ZETIA) 10 MG tablet Take 1 tablet (10 mg) by mouth daily. 90 tablet 3    Ferrous Sulfate 324 MG TBEC Take 1 tablet by mouth daily.      folic acid (FOLVITE) 1 MG tablet TAKE 1 TABLET BY MOUTH DAILY 90 tablet 3    gabapentin (NEURONTIN) 250 MG/5ML solution Take 6 mLs (300 mg) by mouth at bedtime 180 mL 0    Glucagon (GVOKE HYPOPEN) 1 MG/0.2ML pen Inject the contents of 1 device under the skin into lower abdomen, outer thigh, or outer upper arm as needed for hypoglycemia. If no response after 15 minutes, additional 1 mg dose from a new device may be injected while waiting for emergency assistance.      glucose (BD GLUCOSE) 5 g chewable tablet Take 2 tablets (10 g) by mouth as needed (low blood sugar) 40 tablet 1    glucose 40 % (400 mg/mL) gel Take 15-30 g by mouth every 15 minutes as needed for low blood sugar.      hydrALAZINE (APRESOLINE) 50 MG tablet Take 1 tablet (50 mg) by mouth  3 times daily. hold if SBP <100mmHg.      ketoconazole (NIZORAL) 2 % external shampoo Apply topically twice a week. Lather in the shower, wait 3-5 minutes before rinsing.      levothyroxine (SYNTHROID/LEVOTHROID) 175 MCG tablet Take 1 tablet (175 mcg) by mouth daily. 90 tablet 1    linagliptin (TRADJENTA) 5 MG TABS tablet Take 1 tablet (5 mg) by mouth daily. 90 tablet 1    meclizine (ANTIVERT) 25 MG tablet Take 1 tablet (25 mg) by mouth 3 times daily as needed for dizziness or other (migraines).      metoprolol succinate ER (TOPROL XL) 25 MG 24 hr tablet Take 25 mg by mouth daily. HOLD if SBP<100 and/or HR<55      Multiple Vitamins-Minerals (PRESERVISION AREDS 2) CAPS Take 1 capsule by mouth 2 times daily      Nutrisource Fiber PO packet Take 1 packet by mouth daily. Benefiber      omega-3 acid ethyl esters (LOVAZA) 1 g capsule Take 1 capsule (1 g) by mouth 2 times daily.      ondansetron (ZOFRAN ODT) 4 MG ODT tab Take 1 tablet (4 mg) by mouth every 6 hours as needed.      oxymetazoline (AFRIN) 0.05 % nasal spray Spray 0.2 mLs (2 sprays) into both nostrils 2 times daily as needed for congestion. 30 mL 0    predniSONE (DELTASONE) 1 MG tablet Take 4 tablets (4 mg) by mouth daily. Take 4mg (1mg tablets x4) and Take with 5mg tablet to equal 9mg daily 90 tablet 0    predniSONE (DELTASONE) 5 MG tablet Take 1 tablet (5 mg) by mouth daily. Take with 5mg tablet with 4mg (1mg tablets x4) to equal 9mg daily 30 tablet 0    sertraline (ZOLOFT) 50 MG tablet Take 1 tablet (50 mg) by mouth daily. 30 tablet 0    sirolimus (GENERIC EQUIVALENT) 1 MG tablet Take 1 tablet (1 mg) by mouth daily. 90 tablet 3    triamcinolone (KENALOG) 0.1 % external ointment Apply topically 2 times daily. Use 2 weeks or less. 80 g 0    ursodiol (ACTIGALL) 250 MG tablet Take 1 tablet (250 mg) by mouth 2 times daily. 180 tablet 3    Continuous Glucose Sensor (FREESTYLE NINO 2 SENSOR) MISC Change every 14 days. 2 each 5    NONFORMULARY Apply 1 Application  "topically daily as needed. Momma Bear Nerve Lotion - pt uses on feet      tigecycline (TYGACIL) 50 MG injection Inject 50 mg into the vein every 12 hours for 15 days.         ROS:  4 point ROS neg other than the symptoms noted above in the HPI. [facility] 26/30    PHYSICAL EXAM:  BP (!) 155/76   Pulse 72   Temp 98.2  F (36.8  C)   Resp 18   Ht 1.702 m (5' 7\")   Wt 84 kg (185 lb 3.2 oz)   LMP 06/01/1988 (Approximate)   SpO2 93%   BMI 29.01 kg/m    Gen: sitting in up in bed, alert, cooperative and in no acute distress  Resp: breathing non-labored, no tachypnea  Ext: no LE edema  Neuro: CX II-XII grossly in tact; ROM in all four extremities grossly in tact  Psych: alert and oriented to self and general situation; [facility] 26/30  Skin: legs with some flori skin, no warmth - she reports this is chronic     LABORATORY/IMAGING DATA:  Reviewed as per [facility] and/or [facility]    ASSESSMENT/PLAN:    Sigmoid Microperforation w/ Intra-abdominal Infection   Initially on ertapenem, chanaged to tigacycline per ID due to increasing WBC, increased perforated diverticular collection and new pneumobilia.   -- tigacycline 50 mg IV BID (last day 5/28/25)  -- weekly labs, per ID faxed to them  -- follow for clinical signs of infection    CAD, HTN  SBPs 120s-140s. HR 60s-70s.  -- Amlodipine 5 mg daily, hydralazine 50 mg TID, metoprolol XL 25 mg daily  -- Ezetimibe 10 mg daily, fish oil BID     Paroxysmal Atrial Fibrillation  Hx CVA  HR 60s-70s.   -- Metoprolol XL 25 mg daily  -- Apixaban 2.5 mg BID    DM, Type II  Hgb A1c 6.8 last month. Sugars 90s-110s (am) and 150s--170s (cait)  -- Linagliptin 5 mg daily  -- Follow sugars PRN    PBC s/p Liver Transplant (2002)  -- evolocumab 140 mg subQ q14d  -- Sirolimus 1 mg daily  -- ursodiol 250 mg  BID     Cognitive Impairment  [facility] 26/30[facility] [place] [facility]   -- OT following    Recurrent ESBL UTIs  -- D-Mannose BID    Major Recurrent Depression  Was seen by psychiatry during hospitalization " and started on sertraline.  Mood and spirits were good today  -- Sertraline 50 mg daily    Chronic Shoulder Pain  -- APAP 1000 mg BID, diclofenac gel QID, gabapentin 300 mg at bedtime    Fe Deficiency Anemia  Baseline Hgb 9-10. Hgb 10 today.   -- FeSO4 324 mg daily, folic acid 1 mg daily    CKD, Stage III  Baseline Cr 1.3-1.8. Cr 1.4 today.   -- Calcitriol 0.25 mcg daily  -- weekly BMP as above     Hypothyroidisim  TSH 1.72 last month  -- levothyroxine 175 mcg daily     Physical Deconditioning  In setting of hospitalization and underlying medical conditions  -- ongoing PT/OT    Electronically signed by:  Gloria Almaraz MD

## 2025-05-12 ENCOUNTER — TELEPHONE (OUTPATIENT)
Dept: INTERNAL MEDICINE | Facility: CLINIC | Age: 75
End: 2025-05-12

## 2025-05-12 ENCOUNTER — TELEPHONE (OUTPATIENT)
Dept: INFECTIOUS DISEASES | Facility: CLINIC | Age: 75
End: 2025-05-12

## 2025-05-12 ENCOUNTER — TRANSITIONAL CARE UNIT VISIT (OUTPATIENT)
Dept: GERIATRICS | Facility: CLINIC | Age: 75
End: 2025-05-12
Payer: MEDICARE

## 2025-05-12 VITALS
HEIGHT: 67 IN | OXYGEN SATURATION: 93 % | TEMPERATURE: 98.2 F | BODY MASS INDEX: 29.07 KG/M2 | RESPIRATION RATE: 18 BRPM | HEART RATE: 72 BPM | DIASTOLIC BLOOD PRESSURE: 76 MMHG | WEIGHT: 185.2 LBS | SYSTOLIC BLOOD PRESSURE: 155 MMHG

## 2025-05-12 DIAGNOSIS — B99.9 INTRA-ABDOMINAL INFECTION: Primary | ICD-10-CM

## 2025-05-12 DIAGNOSIS — R41.89 COGNITIVE IMPAIRMENT: ICD-10-CM

## 2025-05-12 DIAGNOSIS — N39.0 URINARY TRACT INFECTION: ICD-10-CM

## 2025-05-12 DIAGNOSIS — I12.9 BENIGN HYPERTENSIVE KIDNEY DISEASE WITH CHRONIC KIDNEY DISEASE STAGE I THROUGH STAGE IV, OR UNSPECIFIED: ICD-10-CM

## 2025-05-12 DIAGNOSIS — I25.10 CORONARY ARTERY DISEASE INVOLVING NATIVE CORONARY ARTERY OF NATIVE HEART WITHOUT ANGINA PECTORIS: ICD-10-CM

## 2025-05-12 DIAGNOSIS — E11.9 TYPE 2 DIABETES MELLITUS WITHOUT COMPLICATION, WITHOUT LONG-TERM CURRENT USE OF INSULIN (H): ICD-10-CM

## 2025-05-12 DIAGNOSIS — I48.0 PAF (PAROXYSMAL ATRIAL FIBRILLATION) (H): ICD-10-CM

## 2025-05-12 DIAGNOSIS — R53.81 PHYSICAL DECONDITIONING: ICD-10-CM

## 2025-05-12 DIAGNOSIS — E11.22 TYPE 2 DIABETES MELLITUS WITH STAGE 3B CHRONIC KIDNEY DISEASE, WITHOUT LONG-TERM CURRENT USE OF INSULIN (H): ICD-10-CM

## 2025-05-12 DIAGNOSIS — N18.32 TYPE 2 DIABETES MELLITUS WITH STAGE 3B CHRONIC KIDNEY DISEASE, WITHOUT LONG-TERM CURRENT USE OF INSULIN (H): ICD-10-CM

## 2025-05-12 DIAGNOSIS — N18.32 STAGE 3B CHRONIC KIDNEY DISEASE (H): ICD-10-CM

## 2025-05-12 DIAGNOSIS — D50.9 IRON DEFICIENCY ANEMIA, UNSPECIFIED IRON DEFICIENCY ANEMIA TYPE: ICD-10-CM

## 2025-05-12 DIAGNOSIS — F33.9 RECURRENT MAJOR DEPRESSIVE DISORDER, REMISSION STATUS UNSPECIFIED: ICD-10-CM

## 2025-05-12 PROBLEM — N18.4 CKD (CHRONIC KIDNEY DISEASE) STAGE 4, GFR 15-29 ML/MIN (H): Status: ACTIVE | Noted: 2024-09-22

## 2025-05-12 LAB
ALBUMIN SERPL BCG-MCNC: 3.6 G/DL (ref 3.5–5.2)
ALP SERPL-CCNC: 124 U/L (ref 40–150)
ALT SERPL W P-5'-P-CCNC: 47 U/L (ref 0–50)
ANION GAP SERPL CALCULATED.3IONS-SCNC: 16 MMOL/L (ref 7–15)
AST SERPL W P-5'-P-CCNC: 29 U/L (ref 0–45)
BASOPHILS # BLD AUTO: 0.1 10E3/UL (ref 0–0.2)
BASOPHILS NFR BLD AUTO: 1 %
BILIRUB SERPL-MCNC: 0.3 MG/DL
BUN SERPL-MCNC: 79.3 MG/DL (ref 8–23)
BURR CELLS BLD QL SMEAR: SLIGHT
CALCIUM SERPL-MCNC: 9 MG/DL (ref 8.8–10.4)
CHLORIDE SERPL-SCNC: 102 MMOL/L (ref 98–107)
CREAT SERPL-MCNC: 1.79 MG/DL (ref 0.51–0.95)
CRP SERPL-MCNC: <3 MG/L
EGFRCR SERPLBLD CKD-EPI 2021: 29 ML/MIN/1.73M2
ELLIPTOCYTES BLD QL SMEAR: SLIGHT
EOSINOPHIL # BLD AUTO: 0.2 10E3/UL (ref 0–0.7)
EOSINOPHIL NFR BLD AUTO: 1 %
ERYTHROCYTE [DISTWIDTH] IN BLOOD BY AUTOMATED COUNT: 18.4 % (ref 10–15)
GLUCOSE SERPL-MCNC: 74 MG/DL (ref 70–99)
HCO3 SERPL-SCNC: 19 MMOL/L (ref 22–29)
HCT VFR BLD AUTO: 32.1 % (ref 35–47)
HGB BLD-MCNC: 10 G/DL (ref 11.7–15.7)
IMM GRANULOCYTES # BLD: 0.4 10E3/UL
IMM GRANULOCYTES NFR BLD: 3 %
LACTATE SERPL-SCNC: 2.1 MMOL/L (ref 0.7–2)
LYMPHOCYTES # BLD AUTO: 2.7 10E3/UL (ref 0.8–5.3)
LYMPHOCYTES NFR BLD AUTO: 22 %
MCH RBC QN AUTO: 27.6 PG (ref 26.5–33)
MCHC RBC AUTO-ENTMCNC: 31.2 G/DL (ref 31.5–36.5)
MCV RBC AUTO: 89 FL (ref 78–100)
MONOCYTES # BLD AUTO: 1.7 10E3/UL (ref 0–1.3)
MONOCYTES NFR BLD AUTO: 14 %
NEUTROPHILS # BLD AUTO: 7.6 10E3/UL (ref 1.6–8.3)
NEUTROPHILS NFR BLD AUTO: 60 %
NRBC # BLD AUTO: 0 10E3/UL
NRBC BLD AUTO-RTO: 0 /100
PLAT MORPH BLD: ABNORMAL
PLATELET # BLD AUTO: 413 10E3/UL (ref 150–450)
POTASSIUM SERPL-SCNC: 5 MMOL/L (ref 3.4–5.3)
PROCALCITONIN SERPL IA-MCNC: 0.12 NG/ML
PROT SERPL-MCNC: 6.5 G/DL (ref 6.4–8.3)
RBC # BLD AUTO: 3.62 10E6/UL (ref 3.8–5.2)
RBC MORPH BLD: ABNORMAL
SODIUM SERPL-SCNC: 137 MMOL/L (ref 135–145)
WBC # BLD AUTO: 12.7 10E3/UL (ref 4–11)

## 2025-05-12 PROCEDURE — 84145 PROCALCITONIN (PCT): CPT | Performed by: NURSE PRACTITIONER

## 2025-05-12 PROCEDURE — 83605 ASSAY OF LACTIC ACID: CPT | Performed by: NURSE PRACTITIONER

## 2025-05-12 PROCEDURE — 85014 HEMATOCRIT: CPT | Performed by: NURSE PRACTITIONER

## 2025-05-12 PROCEDURE — 36415 COLL VENOUS BLD VENIPUNCTURE: CPT | Performed by: NURSE PRACTITIONER

## 2025-05-12 PROCEDURE — 86140 C-REACTIVE PROTEIN: CPT | Performed by: NURSE PRACTITIONER

## 2025-05-12 PROCEDURE — 99305 1ST NF CARE MODERATE MDM 35: CPT | Performed by: INTERNAL MEDICINE

## 2025-05-12 PROCEDURE — 84075 ASSAY ALKALINE PHOSPHATASE: CPT | Performed by: NURSE PRACTITIONER

## 2025-05-12 NOTE — LETTER
5/12/2025      Luz Thompson  [address]  [facility] [address]        He had [facility] GERIATRICS  INITIAL VISIT NOTE  May 12, 2025      PRIMARY CARE PROVIDER AND CLINIC:  Natividad Lundberg [address] [facility] [address] / [facility] [address]    [facility] Medical Record Number:  [id number]  Place of Service where encounter took place:  [facility]) [[id number]]    Chief Complaint   Patient presents with     Hospital F/U       HPI:    Luz Thompson is a 75 year old  (1950) female seen today at [facility]. Medical history is notable for PBC s/p liver transplant (2002), CAD, HTN, a-fib, DM,CVA, CKD, anemia, SCC s/p MOHS of left cheek (4/8/25) and recurrent ESBL UTIs on ertapenem since March. She was hospitalized at [facility] from 4/14/2025 to 4/23/2025 where she presented with generalized weakness and was found to have an d.  Colorectal surgery was consulted and recommended medical management.  Psychiatry consulted for depression and she is new on sertraline. She was admitted to this facility for  rehab, medical management, and nursing care.      History obtained from: facility chart records, facility staff, patient report, [facility] chart review, and [facility] chart review.      At [facility], ID changed ertapenem to tigecycline on 5/8/25 due to increasing WBC and imaging with pneumobilia with plan for repeat CT in 2 weeks.     Today, Ms. Thompson is seen in her room sitting up in bed.  She is wondering if she could go home and pay for infusions at home, assuming PT clears her.  She is breast frustration over the recent care conference where PT stated she should consider help with medication set up and get a driving eval.  Otherwise, she is doing well.  No aches or pains.  I asked if she feels she has gained any water weight as it appears she has had a weight gain last week - she has not noticed any edema. No  acute concerns per nursing.  She is working with therapies with goal to return home. [facility] 26/30    CODE STATUS: CPR/Full code     ALLERGIES:  Allergies   Allergen Reactions     Fluconazole Hives and Itching     Full body hives  **Intradermal skin testing negative**  [See intradermal skin testing results from 8/2/2019]     Mycophenolate Diarrhea and Nausea and Vomiting     Patient stated it was chronic and lasted months       Penicillins Anaphylaxis, Hives, Itching and Rash     **Intradermal skin testing negative**  [See intradermal skin testing results from 8/2/2019]       Simvastatin Muscle Pain (Myalgia)     severe  Other reaction(s): Myalgia caused by statin     Methotrexate Other (See Comments)     Other reaction(s): Sore  Sores in mouth, esophagus, and stomach.        Morphine And Codeine Itching and Other (See Comments)     Psych disturbance  Other reaction(s): Confusion, Mood alteration     Quinolones Anxiety, Dizziness, Headache, Other (See Comments), Palpitations and Unknown     Other reaction(s): Hyperactive behavior, Lightheadedness, Mood alteration    Dizzy, light headed    Dizziness, shaky, and jumpy     Capsules, Empty Gelatin [Gelatin]      Carvedilol Diarrhea     Per pt - severe diarrhea and LE swelling  + tolerating Toprol     Lansoprazole Diarrhea     Strawberry Extract      Azithromycin Itching     [See intradermal skin testing results from 8/2/2019]     Bactrim [Sulfamethoxazole-Trimethoprim] Other (See Comments)     Numb mouth, tingling lips (treated with anti-histamines)     Cephalosporins Itching     [See intradermal skin testing results from 8/2/2019]     Ciprofloxacin Hcl Other (See Comments) and Dizziness     Insomnia, mood lability, Irregular heart beat          Doxycycline Itching and Unknown     [See intradermal skin testing results from 8/2/2019]     Lisinopril Cough     Omeprazole Itching     Tolectin [Nsaids] Rash     Tolmetin Rash and Itching     Tramadol Rash, Hives and Itching  "      PAST MEDICAL HISTORY:   Past Medical History:   Diagnosis Date     Anemia of chronic disease 10/17/2011     Anxiety      CKD (chronic kidney disease) stage 3, GFR 30-59 ml/min (H) 04/04/2012     Coccidioidomycosis, 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      Deep venous thrombosis      Diverticulosis of sigmoid colon 12/21/2013     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, history of     Received Rituxan during Summer of 2016     Glaucoma      H/O esophageal varices      Hearing loss      Hyperlipidemia 04/10/2012    Says that she does not have it anymore, not on meds     Hypertension      Hypertriglyceridemia      Liver replaced by transplant (H) 10/17/2011    Dr. 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       PAST SURGICAL HISTORY:   Past Surgical History:   Procedure Laterality Date     APPENDECTOMY  1961     CATARACT IOL, RT/LT      RE12/19/2013, LE12/10/2013 - Toric lenses     CHOLECYSTECTOMY  1991     COLONOSCOPY  03/10/2014    Procedure: COLONOSCOPY;;  Surgeon: Gentry Ramirez MD;  Location:  GI     CYSTOSCOPY       ear drum repair       ENDOBRONCHIAL ULTRASOUND FLEXIBLE N/A 09/29/2017    Procedure: ENDOBRONCHIAL ULTRASOUND FLEXIBLE;  Flexible Bronchoscopy, Endobronchial Ultrasound, Transbronchial Needle Aspiration ;  " Surgeon: Eden Clinton MD;  Location: [facility] OR     ENDOSCOPIC RETROGRADE CHOLANGIOPANCREATOGRAM  2013    Procedure: ENDOSCOPIC RETROGRADE CHOLANGIOPANCREATOGRAM;  Endoscopic Retrograde Cholangiopancreatogram with single balloon enteroscopy, ballon sweep of bile duct;  Surgeon: Brett Membreno MD;  Location: [facility] OR     HC KNEE SCOPE,MED/LAT MENISECTOMY Right 08/10/2012    partial medial menisectomy only     KNEE SURGERY  1966    R knee     PICC INSERTION  2013    4fr SL PASV PICC, 40cm (1cm external) in the R basilic vein w/ tip in the low SVC     PICC INSERTION  2014    5 fr DL BioFlo Navilyst PICC, 46 cm (3 cm external) in the L basilic vein w/ tip in the SVC RA junction.     THYROIDECTOMY  2010     TRANSPLANT LIVER RECIPIENT LIVING UNRELATED  2002       FAMILY HISTORY:   Family History   Problem Relation Age of Onset     Hypertension Mother      Endometrial Cancer Mother      Hyperlipidemia Mother      Prostate Cancer Father      Macular Degeneration Father      Cancer - colorectal Maternal Grandmother         in her 80's, has surgery and removal of part of kidney,  at age 98     Heart Disease Maternal Grandfather          at 98     Glaucoma Maternal Grandfather      Cerebrovascular Disease Paternal Grandmother         in her 80's     Hypertension Paternal Grandmother      Heart Disease Paternal Grandfather         MI     Alzheimer Disease Paternal Grandfather      Allergies Son      Neurologic Disorder Daughter         Migraines     Breast Cancer Other      Anesthesia Reaction No family hx of      Crohn's Disease No family hx of      Ulcerative Colitis No family hx of      Melanoma No family hx of      Skin Cancer No family hx of      SOCIAL HISTORY:   Patient's living condition: alone in an independent living facility     MEDICATIONS:  Post Discharge Medication Reconciliation Status: discharge medications reconciled and changed, per note/orders.  Current Outpatient Medications    Medication Sig Dispense Refill     acetaminophen (TYLENOL) 500 MG tablet Take 1,000 mg by mouth 2 times daily. During 4/16/25 [facility] pt states take BID everyday       amLODIPine (NORVASC) 5 MG tablet Take 1 tablet (5 mg) by mouth daily. HOLD if SBP<100 30 tablet 2     apixaban ANTICOAGULANT (ELIQUIS) 2.5 MG tablet Take 1 tablet (2.5 mg) by mouth 2 times daily. 60 tablet 0     calcitRIOL (ROCALTROL) 0.25 MCG capsule Take 1 capsule (0.25 mcg) by mouth daily. 90 capsule 1     D-MANNOSE PO Take 1 Scoop by mouth 2 times daily.       diclofenac (VOLTAREN) 1 % topical gel Apply 4 g topically 4 times daily.       diphenhydrAMINE (BENADRYL) 25 MG tablet Take 25 mg by mouth every 6 hours as needed for itching or allergies.       EPINEPHrine (ANY BX GENERIC EQUIV) 0.3 MG/0.3ML injection 2-pack Inject 0.3 mLs (0.3 mg) into the muscle as needed for anaphylaxis. May repeat one time in 5-15 minutes if response to initial dose is inadequate. 2 each 1     estradiol (ESTRACE) 0.1 MG/GM vaginal cream Place vaginally three times a week.       evolocumab (REPATHA SURECLICK) 140 MG/ML prefilled autoinjector Inject 1 mL (140 mg) subcutaneously every 14 days. . Please have fasting labs drawn for further refills. 6 mL 3     ezetimibe (ZETIA) 10 MG tablet Take 1 tablet (10 mg) by mouth daily. 90 tablet 3     Ferrous Sulfate 324 MG TBEC Take 1 tablet by mouth daily.       folic acid (FOLVITE) 1 MG tablet TAKE 1 TABLET BY MOUTH DAILY 90 tablet 3     gabapentin (NEURONTIN) 250 MG/5ML solution Take 6 mLs (300 mg) by mouth at bedtime 180 mL 0     Glucagon (GVOKE HYPOPEN) 1 MG/0.2ML pen Inject the contents of 1 device under the skin into lower abdomen, outer thigh, or outer upper arm as needed for hypoglycemia. If no response after 15 minutes, additional 1 mg dose from a new device may be injected while waiting for emergency assistance.       glucose (BD GLUCOSE) 5 g chewable tablet Take 2 tablets (10 g) by mouth as needed (low blood sugar) 40  tablet 1     glucose 40 % (400 mg/mL) gel Take 15-30 g by mouth every 15 minutes as needed for low blood sugar.       hydrALAZINE (APRESOLINE) 50 MG tablet Take 1 tablet (50 mg) by mouth 3 times daily. hold if SBP <100mmHg.       ketoconazole (NIZORAL) 2 % external shampoo Apply topically twice a week. Lather in the shower, wait 3-5 minutes before rinsing.       levothyroxine (SYNTHROID/LEVOTHROID) 175 MCG tablet Take 1 tablet (175 mcg) by mouth daily. 90 tablet 1     linagliptin (TRADJENTA) 5 MG TABS tablet Take 1 tablet (5 mg) by mouth daily. 90 tablet 1     meclizine (ANTIVERT) 25 MG tablet Take 1 tablet (25 mg) by mouth 3 times daily as needed for dizziness or other (migraines).       metoprolol succinate ER (TOPROL XL) 25 MG 24 hr tablet Take 25 mg by mouth daily. HOLD if SBP<100 and/or HR<55       Multiple Vitamins-Minerals (PRESERVISION AREDS 2) CAPS Take 1 capsule by mouth 2 times daily       Nutrisource Fiber PO packet Take 1 packet by mouth daily. Benefiber       omega-3 acid ethyl esters (LOVAZA) 1 g capsule Take 1 capsule (1 g) by mouth 2 times daily.       ondansetron (ZOFRAN ODT) 4 MG ODT tab Take 1 tablet (4 mg) by mouth every 6 hours as needed.       oxymetazoline (AFRIN) 0.05 % nasal spray Spray 0.2 mLs (2 sprays) into both nostrils 2 times daily as needed for congestion. 30 mL 0     predniSONE (DELTASONE) 1 MG tablet Take 4 tablets (4 mg) by mouth daily. Take 4mg (1mg tablets x4) and Take with 5mg tablet to equal 9mg daily 90 tablet 0     predniSONE (DELTASONE) 5 MG tablet Take 1 tablet (5 mg) by mouth daily. Take with 5mg tablet with 4mg (1mg tablets x4) to equal 9mg daily 30 tablet 0     sertraline (ZOLOFT) 50 MG tablet Take 1 tablet (50 mg) by mouth daily. 30 tablet 0     sirolimus (GENERIC EQUIVALENT) 1 MG tablet Take 1 tablet (1 mg) by mouth daily. 90 tablet 3     triamcinolone (KENALOG) 0.1 % external ointment Apply topically 2 times daily. Use 2 weeks or less. 80 g 0     ursodiol (ACTIGALL)  "250 MG tablet Take 1 tablet (250 mg) by mouth 2 times daily. 180 tablet 3     Continuous Glucose Sensor (FREESTYLE NINO 2 SENSOR) MISC Change every 14 days. 2 each 5     NONFORMULARY Apply 1 Application topically daily as needed. Momma Bear Nerve Lotion - pt uses on feet       tigecycline (TYGACIL) 50 MG injection Inject 50 mg into the vein every 12 hours for 15 days.         ROS:  4 point ROS neg other than the symptoms noted above in the HPI. [facility] 26/30    PHYSICAL EXAM:  BP (!) 155/76   Pulse 72   Temp 98.2  F (36.8  C)   Resp 18   Ht 1.702 m (5' 7\")   Wt 84 kg (185 lb 3.2 oz)   LMP 06/01/1988 (Approximate)   SpO2 93%   BMI 29.01 kg/m    Gen: sitting in up in bed, alert, cooperative and in no acute distress  Resp: breathing non-labored, no tachypnea  Ext: no LE edema  Neuro: CX II-XII grossly in tact; ROM in all four extremities grossly in tact  Psych: alert and oriented to self and general situation; [facility] 26/30  Skin: legs with some flori skin, no warmth - she reports this is chronic     LABORATORY/IMAGING DATA:  Reviewed as per [facility] and/or [facility]    ASSESSMENT/PLAN:    Sigmoid Microperforation w/ Intra-abdominal Infection   Initially on ertapenem, chanaged to tigacycline per ID due to increasing WBC, increased perforated diverticular collection and new pneumobilia.   -- tigacycline 50 mg IV BID (last day 5/28/25)  -- weekly labs, per ID faxed to them  -- follow for clinical signs of infection    CAD, HTN  SBPs 120s-140s. HR 60s-70s.  -- Amlodipine 5 mg daily, hydralazine 50 mg TID, metoprolol XL 25 mg daily  -- Ezetimibe 10 mg daily, fish oil BID     Paroxysmal Atrial Fibrillation  Hx CVA  HR 60s-70s.   -- Metoprolol XL 25 mg daily  -- Apixaban 2.5 mg BID    DM, Type II  Hgb A1c 6.8 last month. Sugars 90s-110s (am) and 150s--170s (cait)  -- Linagliptin 5 mg daily  -- Follow sugars PRN    PBC s/p Liver Transplant (2002)  -- evolocumab 140 mg subQ q14d  -- Sirolimus 1 mg daily  -- ursodiol 250 " mg  BID     Cognitive Impairment  [facility] 26/30. Lives in [facility].   -- OT following    Recurrent ESBL UTIs  -- D-Mannose BID    Major Recurrent Depression  Was seen by psychiatry during hospitalization and started on sertraline.  Mood and spirits were good today  -- Sertraline 50 mg daily    Chronic Shoulder Pain  -- APAP 1000 mg BID, diclofenac gel QID, gabapentin 300 mg at bedtime    Fe Deficiency Anemia  Baseline Hgb 9-10. Hgb 10 today.   -- FeSO4 324 mg daily, folic acid 1 mg daily    CKD, Stage III  Baseline Cr 1.3-1.8. Cr 1.4 today.   -- Calcitriol 0.25 mcg daily  -- weekly BMP as above     Hypothyroidisim  TSH 1.72 last month  -- levothyroxine 175 mcg daily     Physical Deconditioning  In setting of hospitalization and underlying medical conditions  -- ongoing PT/OT    Electronically signed by:  Gloria Almaraz MD                                  Sincerely,        Gloria Almaraz MD    Electronically signed

## 2025-05-12 NOTE — TELEPHONE ENCOUNTER
Jesus Frausto [facility] * received via fax     Forms in [employer] mail box for review and signature.

## 2025-05-12 NOTE — PROGRESS NOTES
[facility] GERIATRICS    Chief Complaint   Patient presents with    RECHECK     HPI:  Luz Thompson is a 75 year old  (1950), who is being seen today for an episodic care visit at: [facility] ([facility])([facility]) [[id number]]. Today's concern is: The primary encounter diagnosis was Intra-abdominal infection. Diagnoses of Physical deconditioning, Generalized muscle weakness, Slow transit constipation, Age-related osteoporosis without current pathological fracture, Bilateral low back pain without sciatica, unspecified chronicity, Chronic shoulder pain, unspecified laterality, Chronic anemia, Hypothyroidism, unspecified type, 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, S/P Mohs surgery for basal cell carcinoma, Hyperlipidemia LDL goal <70, RASHIDA (obstructive sleep apnea), Stage 3b chronic kidney disease (H), Type 2 diabetes mellitus with stage 3b chronic kidney disease, without long-term current use of insulin (H), Depression, unspecified depression type, PAF (paroxysmal atrial fibrillation) (H), Chronic diastolic heart failure (H), Status post liver transplantation (H), Hypertension goal BP (blood pressure) < 140/80, Abnormal liver function tests, Nocturnal hypoxemia, Immunocompromised, Hx of sepsis, Hx of bacteremia, Seborrheic dermatitis, Papillary thyroid carcinoma (H), Vaginal bleeding, Epistaxis, Benign essential hypertension, Dizziness, and CKD (chronic kidney disease) stage 4, GFR 15-29 ml/min (H) were also pertinent to this visit.    Met with patient who was found lying in bed eating breakfast. She denies any chest pain, palpitations, shortness of breath, [place], lightheadedness, dizziness, or cough. Denies any abdominal discomfort. Denies N&V. Denies B&B concerns. Denies dysuria or frequency. Reports no further vaginal bleeding. Denies loose or constipation. Appetite good. Sleeping well. Denies any complaints of pain. She was updated today regarding ID/Colorectal concerns  "regarding most recent CT scan. She was updated regarding IV course along with repeat CT scan scheduled for next week. Now on new IV course. No allergy reaction reported. Has been getting benadryl orally 30 minutes prior to IV course S/T allergy risks.     BP Readings from Last 3 Encounters:   05/13/25 127/59   05/12/25 (!) 155/76   05/07/25 125/75     Wt Readings from Last 5 Encounters:   05/13/25 84 kg (185 lb 3.2 oz)   05/12/25 84 kg (185 lb 3.2 oz)   05/07/25 82.1 kg (181 lb)   05/05/25 82.1 kg (181 lb)   05/02/25 82.2 kg (181 lb 3.2 oz)     Allergies, and PMH/PSH reviewed in EPIC today.  REVIEW OF SYSTEMS:  4 point ROS including Respiratory, CV, GI and , other than that noted in the HPI,  is negative    Objective:   /59   Pulse 70   Temp 97.9  F (36.6  C)   Resp 18   Ht 1.702 m (5' 7\")   Wt 84 kg (185 lb 3.2 oz)   LMP 06/01/1988 (Approximate)   SpO2 96%   BMI 29.01 kg/m    GENERAL APPEARANCE:  Alert, in no distress, cooperative  ENT:  Mouth and posterior oropharynx normal, moist mucous membranes, [place]  EYES:  EOM, conjunctivae, lids, pupils and irises normal  NECK:  No adenopathy,masses or thyromegaly  RESP:  respiratory effort and palpation of chest normal, lungs clear to auscultation , no respiratory distress  CV:  regular rate and rhythm, no murmur, rub, or gallop, peripheral edema 1+ in BLE  ABDOMEN:  normal bowel sounds, soft, nontender, no hepatosplenomegaly or other masses, no guarding or rebound  M/S:   Ambulates with upwalker. Staff to assist for ADLs, transfers and cares  SKIN:  Inspection of skin and subcutaneous tissue baseline, Palpation of skin and subcutaneous tissue baseline  NEURO:   Cranial nerves 2-12 are normal tested and grossly at patient's baseline, no purposeful movement in upper and lower extremities  PSYCH:  oriented X 3, memory impaired , affect and mood normal    Most Recent 3 CBC's:  Recent Labs   Lab Test 05/12/25  1140 05/05/25  1102 04/28/25  1213   WBC 12.7* 15.6* " 14.2*   HGB 10.0* 10.1* 9.9*   MCV 89 90 88    400 497*     Most Recent 3 BMP's:  Recent Labs   Lab Test 05/12/25  1140 05/05/25  1102 04/28/25  1213    140 139   POTASSIUM 5.0 4.7 4.5   CHLORIDE 102 103 103   CO2 19* 22 20*   BUN 79.3* 44.3* 39.9*   CR 1.79* 1.54* 1.45*   ANIONGAP 16* 15 16*   KEILY 9.0 9.3 9.3   GLC 74 93 103*     Most Recent 2 LFT's:  Recent Labs   Lab Test 05/12/25  1140 05/05/25  1102   AST 29 20   ALT 47 14   ALKPHOS 124 101   BILITOTAL 0.3 0.3     Most Recent ESR & CRP:  Recent Labs   Lab Test 05/12/25  1140 04/28/25  1213 04/15/25  0613 06/13/23  1820 08/26/19  2331   SED  --   --  61*  --  78*   CRP  --   --   --   --  180.0*   CRPI <3.00   < >  --    < >  --     < > = values in this interval not displayed.     Most Recent Anemia Panel:  Recent Labs   Lab Test 05/12/25  1140 04/14/25  1410 04/07/25  0945 09/04/24  1008 06/15/23  1028   WBC 12.7*   < > 6.5   < > 7.6   HGB 10.0*   < > 9.1*   < > 12.1   HCT 32.1*   < > 30.0*   < > 38.4   MCV 89   < > 88   < > 92      < > 351   < > 345   IRON  --   --  36*   < >  --    IRONSAT  --   --  13*   < >  --    FEB  --   --  277   < >  --    LAURA  --   --  44   < >  --    B12  --   --   --   --  456    < > = values in this interval not displayed.       ASSESSMENT/PLAN:  sepsis 2/2 intraabdominal infection - resolved  Recurrent UTIs on IV ertapenem since 3/9/2024  Recent Hx of E. Faecalis bacteremia  Sigmoid microperforation  Leukocytosis  Comment: Admitted for generalized weakness likely 2/2 infection. Found to have intraabdominal infection secondary to a sigmoid microperforation. Recent admission in March 2025 for Klebsiella pneumonia UTI and E. Faecalis bacteremia with recommendations from ID for ertapenem 1g q24h until EOT 4/22. RVP negative.Abdominal CT scan scheduled as directed for 5/5/25 at 1220pm--results as reviewed above--Urine screen assessed twice with negative bacteria.   Plan:  -Follow up with ID as directed. Scheduled  for 5/28/25  -Follow up with colorectal surgery as directed. Scheduled for 5/27/25 to further discuss sigmoidectomy needs  -Follow up with urology as directed. Scheduled for 6/10/25  -Repeat abdominal CT scan scheduled for 5/22/25--patient updated  -Continue vaginal estradiol MWF at HS  -Benadryl PRN--Administer Benadryl prn 30 min. before administration of IV antibiotic Tigecycline.  -Monitor for worsening s/symptoms of concerns  -Per ID on 5/8/25 IV abc course from switched from ertapenem to now Tigecycline 50mg IV BID until 5/2825  -CMP, CBC with diff and CRP due weekly on Mondays as directed until 6/2/25. Fax results to [phone] AUSTYN Del Rio  -Abdominal CT scan rescheduled for 5/22/25     Liver transplant in 2002 2/2 primary biliary cirrhosis  Comment: Chronic. Follows Dr. Ramirez.   Plan:  -Continue PTA sirolimus 1 mg every day  -Reduced prednisone down to 9mg daily for now. (5mg with 4 tablets of 1mg=9mg total)  -Continue PTA ursodiol 250 mg BID  -Follow up with transplant team as directed. Scheduled for 5/28/25  -CMP, CBC with diff and CRP due weekly on Mondays as directed with no end date at this time. Fax results to [phone] AUSTYN Del Rio     Depression   Comment: Chronic. Reports feeling more down and depressed with little interest in doing things. Has a therapist in outpatient setting and has been on anti-depressants historically.   Plan:  -Monitor mood and behaviors  -Monitor for worsening s/symptoms of concerns  -Monitor for changes in mobility, eating and sleeping patterns  -Continue zoloft 50mg daily.   -Follow up with psych post TCU  -CMP, CBC with diff and CRP due weekly on Mondays as directed with no end date at this time. Fax results to [phone] AUSTYN Del Rio     Mild Obstructive Sleep Apnea   Nocturnal Hypoxemia   Comment: Chronic. Sleep study in 2018 showing mild RASHIDA with recommendation to start CPAP. IT does not appear that there was further follow up and  not using CPAP.  Plan:  -Monitor sleeping patterns  -Monitor respiratory status  -PCP to consider sleep medicine referral  -CMP, CBC with diff and CRP due weekly on Mondays as directed with no end date at this time. Fax results to [phone] AUSTYN Del Rio     CKD3b w/ moderate proteinuria, at baseline  HTN  CVD/HLD  paroxysmal atrial fibrillation  Comment: Chronic. Baseline Cr. 1.5. History of dialysis at time of liver transplant 23 years ago. Recurrent AKIs and immunosuppressive medication toxicity. Not on SGLT2i 2/2 recurrent UTI. Chart review w/ unclear heart failure history, unconfirmed with EF 60-65% on 3/10/25. BNP 1446 elevated from 1 month ago. Given unclear HF history, trace bilateral LE edema, with some pulm edema noted on imaging, consideration for volume overload. However, without orthopnea or clear cough.   Plan:  -Continue amlodipine 5mg daily. HOLD if SBP<100  -Continue Meclizine 25mg TID PRN  -Continue PTA hydralazine 50mg TID. Hold for SBP <100mmHg.   -Continue PTA metoprolol succinate 25mg every day. HOLD if SBP<100 and/or HR<55  -Monitor BP and HR  -HOLD PTA evolocumab q2week. Follow up with PCP post TCU to resume. Resume off while on ANTIBIOTIC course-due 5/8 or later  -Continue PTA eztimibe 10mg every day  -Continue reduced apixaban 2.5mg BID ongoing for now given vaginal bleeding risks  -CMP, CBC with diff and CRP due weekly on Mondays as directed with no end date at this time. Fax results to [phone] AUSTYN Del Rio         T2DM  (A1c 6.8% 4/14/25)  Comment: Chronic. Last A1c 6.8% on 4/14/25.   Plan:  -Continue PTA linagliptin 5mg every day  -Continue PTA gabapentin 300mg at bedtime  -Blood Glucose monitoring back to previous schedule of BID. HOLD off on using freestyle phil 2 sensor while on TCU  -CMP, CBC with diff and CRP due weekly on Mondays as directed with no end date at this time. Fax results to [phone] AUSTYN zaragoza [facility]  4/8/25  Skin lesion  Comment: Tumor debulk with perineural invasion w/ pending Tumor Board for consideration of radiation therapy. Wound does not look infected. Today 5/7:She does report having skin lesion to left anterior forearm. 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 Will defer to dermatology--next visit 5/13/25.   Plan:  -Monitor for worsening s/symptoms of concerns  -Follow up with dermatology regarding mass noted to left anterior forearm. Dermatology requested for PET scan which is scheduled for 5/29/25     anemia, chronic  Comment: Chronic. Hgb 9.9 on admit. Stable.  Plan:  -Monitor bleeding risks  -Continue PTA ferrous sulfate daily  -Continue folic acid daily  -CMP, CBC with diff and CRP due weekly on Mondays as directed with no end date at this time. Fax results to [phone] AUSTYN Del Rio     Hypothyroidism  Papillary thyroid carcinoma  Chronic. Recent TSH~1.72 on 4/4/25  Plan;  -Continue PTA levothyroxine 175mcg qD   -Monitor for worsening s/symptoms of concerns  -CMP, CBC with diff and CRP due weekly on Mondays as directed with no end date at this time. Fax results to [phone] AUSTYN Del Rio     Chronic Shoulder Pain  Osteoarthritis   Lower back pain  Comment: Chronic. stable  Plan:  -Monitor pain complaints  -Continue diclofenac gel application to painful areas QID  -Continue Tylenol 1000mg BID   -Continue gabapentin 300mg at HS--only able to tolerate liquid given gelatin capsule allergy  -CMP, CBC with diff and CRP due weekly on Mondays as directed with no end date at this time. Fax results to [phone] AUSTYN Del Rio     Constipation   Comment: Chronic. S/T polypharmacy  Plan:  -Monitor BM patterns  -Continue beneFiber qd    -Zofran PRN     Epistaxis  Comment: Acute on chronic. She reports occasional nose bleed at times. No episodes while on TCU. She reports history of good relief with pressure and ice application  Plan:  -Monitor  bleeding risks  -Monitor for worsening s/symptoms of concerns  -Afrin BID PRN on TCU if warranted     Vaginal bleeding  Comment: Acute on chronic. 3 episodes of painless vaginal bleeding noted since TCU admission. Urine screens x 2 negative. Last menstrual period she reports has been 40 years ago or so. Known family history of endometrial cancer that metastasized to bowel per her reports. She reports her mother had extensive genetic testing for review in EPIC. Reports mother name: Anne Yap  3/9/23 for reference if indicated. Denies any further vaginal bleeding at this time.   Plan:  -urine was negative for concerns or evidence of blood  -Continue reduced apixaban dose of 2.5mg BID for now given vaginal bleeding risks  -Urgent referral for OBGYN referral in place for further testing needs---Scheduled for 25  -CMP, CBC with diff and CRP due weekly on  as directed with no end date at this time. Fax results to [phone] ATTN Sindhu Del Rio     Physical deconditioning  Generalized muscle weakness  Comment: Acute on chronic. Known poor history PTA. [facility] given concerns. Per therapy-Ambulates up to 200ft with upwalker. SBA for most needs and LB cares. 26/30 on the SLUMS, just below normal indicating MNCD  Plan:  -Continue Physical therapy and Occupational therapy  -Recommend cognitive testing on site  -Highly recommend longterm compliance post TCU     Electronically signed by:  Dr. Manda Flor DNP, APRN, FNP-C, WCS-C, EDS-C     Provider reviewed records from facility, and interpreted most recent imaging/lab work, and vital signs.   Acute and chronic conditions managed by writer. Have been reviewed during today's exam

## 2025-05-12 NOTE — TELEPHONE ENCOUNTER
Diagnosis, Referred by & from: Diverticulitis   Appt date: 5/27/2025   NOTES STATUS DETAILS   OFFICE NOTE from referring provider N/A    OFFICE NOTE from other specialist [facility]:  ([facility]) 5/23/25 - OBGYN OV with Dr. Jason  2/6/25 - PCC OV with Wendy Conway NP    [facility]:  5/12/25 - GERIATRICS OV with Dr. Almaraz    [facility]:  4/28/25 - ID OV with Dr. Lundberg  10/17/24 - HEP OV with Dr. Ramirez  11/26/13 - CR OV with Dr. Mckeon    [facility]:  8/20/24 - URO OV with TIFFANIE Hatch   DISCHARGE SUMMARY from hospital [facility]:  4/14/25 - Admission with Dr. Luz  3/8/25 - Admission with Dr. Aquino  1/19/25 - Admission with Dr. Woodard  * Additional in Epic   DISCHARGE REPORT from the ER [facility]:  6/13/23 - ED OV with Dr. Cano   OPERATIVE REPORT N/A    MEDICATION LIST Internal    LABS     BIOPSIES/PATHOLOGY RELATED TO DIAGNOSIS Internal [facility]:  8/27/12 - Colon Biopsy (Case: [id number])  12/29/11 - Colon Biopsy (Case: [id number])   DIAGNOSTIC PROCEDURES     COLONOSCOPY (most recent all time after 5 years) [facility]:  8/31/17 - Colonoscopy - No Specimens  3/10/14 - Colonoscopy - No Specimens   FLEX SIGMOIDOSCOPY [facility]:  8/27/12 - Flexible Sigmoidoscopy   UPPER ENDOSCOPY (EGD) [facility]:  8/27/12 - EGD   ERCP [facility]:  9/19/13 - ERCP   IMAGING (DISC & REPORT)      CT [facility]:  ([facility]) 5/22/25 - CT Abd/Pelvis  5/5/25 - CT Abd/Pelvis  4/16/25 - CT Abd/Pelvis  3/8/25 - CT Abd/Pelvis  1/22/25 - CT Chest/Abd/Pelvis  12/10/24 - CT Abd/Pelvis   XRAY [facility]:  1/20/25 - XR Pelvis/Hip   ULTRASOUND  (ENDOANAL/ENDORECTAL) [facility]:  3/10/25 - US Liver

## 2025-05-13 ENCOUNTER — TRANSITIONAL CARE UNIT VISIT (OUTPATIENT)
Dept: GERIATRICS | Facility: CLINIC | Age: 75
End: 2025-05-13
Payer: MEDICARE

## 2025-05-13 ENCOUNTER — TELEPHONE (OUTPATIENT)
Dept: INFECTIOUS DISEASES | Facility: CLINIC | Age: 75
End: 2025-05-13

## 2025-05-13 VITALS
RESPIRATION RATE: 18 BRPM | HEIGHT: 67 IN | BODY MASS INDEX: 29.07 KG/M2 | TEMPERATURE: 97.9 F | SYSTOLIC BLOOD PRESSURE: 127 MMHG | HEART RATE: 70 BPM | DIASTOLIC BLOOD PRESSURE: 59 MMHG | OXYGEN SATURATION: 96 % | WEIGHT: 185.2 LBS

## 2025-05-13 DIAGNOSIS — C44.329 SQUAMOUS CELL CANCER OF SKIN OF LEFT CHEEK: ICD-10-CM

## 2025-05-13 DIAGNOSIS — Z86.19 HX OF SEPSIS: ICD-10-CM

## 2025-05-13 DIAGNOSIS — I50.32 CHRONIC DIASTOLIC HEART FAILURE (H): ICD-10-CM

## 2025-05-13 DIAGNOSIS — E78.5 HYPERLIPIDEMIA LDL GOAL <70: ICD-10-CM

## 2025-05-13 DIAGNOSIS — K59.01 SLOW TRANSIT CONSTIPATION: ICD-10-CM

## 2025-05-13 DIAGNOSIS — D64.9 CHRONIC ANEMIA: ICD-10-CM

## 2025-05-13 DIAGNOSIS — N18.4 CKD (CHRONIC KIDNEY DISEASE) STAGE 4, GFR 15-29 ML/MIN (H): ICD-10-CM

## 2025-05-13 DIAGNOSIS — R42 DIZZINESS: ICD-10-CM

## 2025-05-13 DIAGNOSIS — R53.81 PHYSICAL DECONDITIONING: ICD-10-CM

## 2025-05-13 DIAGNOSIS — Z87.898 HX OF BACTEREMIA: ICD-10-CM

## 2025-05-13 DIAGNOSIS — I48.0 PAF (PAROXYSMAL ATRIAL FIBRILLATION) (H): ICD-10-CM

## 2025-05-13 DIAGNOSIS — D84.9 IMMUNOCOMPROMISED: ICD-10-CM

## 2025-05-13 DIAGNOSIS — N18.32 STAGE 3B CHRONIC KIDNEY DISEASE (H): ICD-10-CM

## 2025-05-13 DIAGNOSIS — I10 HYPERTENSION GOAL BP (BLOOD PRESSURE) < 140/80: ICD-10-CM

## 2025-05-13 DIAGNOSIS — M25.519 CHRONIC SHOULDER PAIN, UNSPECIFIED LATERALITY: ICD-10-CM

## 2025-05-13 DIAGNOSIS — M62.81 GENERALIZED MUSCLE WEAKNESS: ICD-10-CM

## 2025-05-13 DIAGNOSIS — B99.9 INTRA-ABDOMINAL INFECTION: Primary | ICD-10-CM

## 2025-05-13 DIAGNOSIS — N93.9 VAGINAL BLEEDING: ICD-10-CM

## 2025-05-13 DIAGNOSIS — G47.34 NOCTURNAL HYPOXEMIA: ICD-10-CM

## 2025-05-13 DIAGNOSIS — E03.9 HYPOTHYROIDISM, UNSPECIFIED TYPE: ICD-10-CM

## 2025-05-13 DIAGNOSIS — G47.33 OSA (OBSTRUCTIVE SLEEP APNEA): ICD-10-CM

## 2025-05-13 DIAGNOSIS — G89.29 CHRONIC SHOULDER PAIN, UNSPECIFIED LATERALITY: ICD-10-CM

## 2025-05-13 DIAGNOSIS — R04.0 EPISTAXIS: ICD-10-CM

## 2025-05-13 DIAGNOSIS — M54.50 BILATERAL LOW BACK PAIN WITHOUT SCIATICA, UNSPECIFIED CHRONICITY: ICD-10-CM

## 2025-05-13 DIAGNOSIS — I10 BENIGN ESSENTIAL HYPERTENSION: ICD-10-CM

## 2025-05-13 DIAGNOSIS — L21.9 SEBORRHEIC DERMATITIS: ICD-10-CM

## 2025-05-13 DIAGNOSIS — N18.32 TYPE 2 DIABETES MELLITUS WITH STAGE 3B CHRONIC KIDNEY DISEASE, WITHOUT LONG-TERM CURRENT USE OF INSULIN (H): ICD-10-CM

## 2025-05-13 DIAGNOSIS — R79.89 ABNORMAL LIVER FUNCTION TESTS: ICD-10-CM

## 2025-05-13 DIAGNOSIS — Z94.4 STATUS POST LIVER TRANSPLANTATION (H): ICD-10-CM

## 2025-05-13 DIAGNOSIS — M81.0 AGE-RELATED OSTEOPOROSIS WITHOUT CURRENT PATHOLOGICAL FRACTURE: ICD-10-CM

## 2025-05-13 DIAGNOSIS — Z98.890 S/P MOHS SURGERY FOR BASAL CELL CARCINOMA: ICD-10-CM

## 2025-05-13 DIAGNOSIS — F32.A DEPRESSION, UNSPECIFIED DEPRESSION TYPE: ICD-10-CM

## 2025-05-13 DIAGNOSIS — E11.22 TYPE 2 DIABETES MELLITUS WITH STAGE 3B CHRONIC KIDNEY DISEASE, WITHOUT LONG-TERM CURRENT USE OF INSULIN (H): ICD-10-CM

## 2025-05-13 DIAGNOSIS — C73 PAPILLARY THYROID CARCINOMA (H): ICD-10-CM

## 2025-05-13 DIAGNOSIS — N39.0 URINARY TRACT INFECTION: ICD-10-CM

## 2025-05-13 DIAGNOSIS — Z85.828 S/P MOHS SURGERY FOR BASAL CELL CARCINOMA: ICD-10-CM

## 2025-05-13 PROCEDURE — 99309 SBSQ NF CARE MODERATE MDM 30: CPT | Performed by: NURSE PRACTITIONER

## 2025-05-13 RX ORDER — TIGECYCLINE 50 MG/10ML
50 INJECTION, POWDER, LYOPHILIZED, FOR SOLUTION INTRAVENOUS EVERY 12 HOURS
Status: SHIPPED
Start: 2025-05-13 | End: 2025-05-28

## 2025-05-13 NOTE — TELEPHONE ENCOUNTER
See below for updated IV orders, administered at TCU.    PRIMITIVO Ross, RN  RN Care Coordinator [facility]      ----- Message from Manda Flor sent at 5/13/2025  9:10 AM CDT -----  Regarding: RE: update  She has been on the IV since it was ordered last week. I will extend it on the TCU side. No need to send ordersAlso wondering about her CT scan? Patient reports she has a PET scan scheduled for 5/29 per dermatology? Was a PET scan needed or CT?Wanted to clarify. Thanks-Manda  ----- Message -----  From: Linda Tilley, NENO  Sent: 5/13/2025   8:34 AM CDT  To: Natividad Lundberg MD; LIZ Moseley CN#  Subject: RE: update                                       Good Morning- Manda- will you be taking care of adding the IV medication at your facility or do I need to fax orders? Thank you-PRIMITIVO Ross, RNRN Care Coordinator [facility]  ----- Message -----  From: Manda Flor APRN CNP  Sent: 5/13/2025   7:49 AM CDT  To: Natividad Lundberg MD; Gayatri Shelby RN; S#  Subject: RE: update                                       Will do. Will make sure TCU staff aware-aMnda  ----- Message -----  From: Natividad Lundberg MD  Sent: 5/12/2025   8:00 PM CDT  To: LIZ Moseley CNP; Gayatri Shelby RN; #  Subject: RE: update                                       Thank you all !!! Lets extend the iv tigecycline till she follows up with me, I see she is scheduled on 5/28. AB  ----- Message -----  From: Gayatri Shelby, RN  Sent: 5/12/2025  11:36 AM CDT  To: Michelle Feliz PA-C; Nancy Culver PA-C; Nancy#  Subject: RE: update                                       She is scheduled 5/27. I offered 5/20 but she said it was too early for her  ----- Message -----  From: Al Campbell MD  Sent: 5/9/2025   5:11 PM CDT  To: Michelle Feliz PA-C; Nancy Culver PA-C; Ke#  Subject: RE: update                                       Hi,    Let s bring her to clinic to discuss a  possible sigmoidectomy. I agree the overall picture is concerning.    Al Elizabeth

## 2025-05-13 NOTE — LETTER
5/13/2025      Luz Thompson  [address]  [facility] [address]        [facility] GERIATRICS    Chief Complaint   Patient presents with     RECHECK     HPI:  Luz Thompson is a 75 year old  (1950), who is being seen today for an episodic care visit at: [facility] ([facility])([facility]) [[id number]]. Today's concern is: The primary encounter diagnosis was Intra-abdominal infection. Diagnoses of Physical deconditioning, Generalized muscle weakness, Slow transit constipation, Age-related osteoporosis without current pathological fracture, Bilateral low back pain without sciatica, unspecified chronicity, Chronic shoulder pain, unspecified laterality, Chronic anemia, Hypothyroidism, unspecified type, 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, S/P Mohs surgery for basal cell carcinoma, Hyperlipidemia LDL goal <70, RASHIDA (obstructive sleep apnea), Stage 3b chronic kidney disease (H), Type 2 diabetes mellitus with stage 3b chronic kidney disease, without long-term current use of insulin (H), Depression, unspecified depression type, PAF (paroxysmal atrial fibrillation) (H), Chronic diastolic heart failure (H), Status post liver transplantation (H), Hypertension goal BP (blood pressure) < 140/80, Abnormal liver function tests, Nocturnal hypoxemia, Immunocompromised, Hx of sepsis, Hx of bacteremia, Seborrheic dermatitis, Papillary thyroid carcinoma (H), Vaginal bleeding, Epistaxis, Benign essential hypertension, Dizziness, and CKD (chronic kidney disease) stage 4, GFR 15-29 ml/min (H) were also pertinent to this visit.    Met with patient who was found lying in bed eating breakfast. She denies any chest pain, palpitations, shortness of breath, [place], lightheadedness, dizziness, or cough. Denies any abdominal discomfort. Denies N&V. Denies B&B concerns. Denies dysuria or frequency. Reports no further vaginal bleeding. Denies loose or constipation. Appetite good. Sleeping well.  "Denies any complaints of pain. She was updated today regarding ID/Colorectal concerns regarding most recent CT scan. She was updated regarding IV course along with repeat CT scan scheduled for next week. Now on new IV course. No allergy reaction reported. Has been getting benadryl orally 30 minutes prior to IV course S/T allergy risks.     BP Readings from Last 3 Encounters:   05/13/25 127/59   05/12/25 (!) 155/76   05/07/25 125/75     Wt Readings from Last 5 Encounters:   05/13/25 84 kg (185 lb 3.2 oz)   05/12/25 84 kg (185 lb 3.2 oz)   05/07/25 82.1 kg (181 lb)   05/05/25 82.1 kg (181 lb)   05/02/25 82.2 kg (181 lb 3.2 oz)     Allergies, and PMH/PSH reviewed in EPIC today.  REVIEW OF SYSTEMS:  4 point ROS including Respiratory, CV, GI and , other than that noted in the HPI,  is negative    Objective:   /59   Pulse 70   Temp 97.9  F (36.6  C)   Resp 18   Ht 1.702 m (5' 7\")   Wt 84 kg (185 lb 3.2 oz)   LMP 06/01/1988 (Approximate)   SpO2 96%   BMI 29.01 kg/m    GENERAL APPEARANCE:  Alert, in no distress, cooperative  ENT:  Mouth and posterior oropharynx normal, moist mucous membranes, [place]  EYES:  EOM, conjunctivae, lids, pupils and irises normal  NECK:  No adenopathy,masses or thyromegaly  RESP:  respiratory effort and palpation of chest normal, lungs clear to auscultation , no respiratory distress  CV:  regular rate and rhythm, no murmur, rub, or gallop, peripheral edema 1+ in BLE  ABDOMEN:  normal bowel sounds, soft, nontender, no hepatosplenomegaly or other masses, no guarding or rebound  M/S:   Ambulates with upwalker. Staff to assist for ADLs, transfers and cares  SKIN:  Inspection of skin and subcutaneous tissue baseline, Palpation of skin and subcutaneous tissue baseline  NEURO:   Cranial nerves 2-12 are normal tested and grossly at patient's baseline, no purposeful movement in upper and lower extremities  PSYCH:  oriented X 3, memory impaired , affect and mood normal    Most Recent 3 " CBC's:  Recent Labs   Lab Test 05/12/25  1140 05/05/25  1102 04/28/25  1213   WBC 12.7* 15.6* 14.2*   HGB 10.0* 10.1* 9.9*   MCV 89 90 88    400 497*     Most Recent 3 BMP's:  Recent Labs   Lab Test 05/12/25  1140 05/05/25  1102 04/28/25  1213    140 139   POTASSIUM 5.0 4.7 4.5   CHLORIDE 102 103 103   CO2 19* 22 20*   BUN 79.3* 44.3* 39.9*   CR 1.79* 1.54* 1.45*   ANIONGAP 16* 15 16*   KEILY 9.0 9.3 9.3   GLC 74 93 103*     Most Recent 2 LFT's:  Recent Labs   Lab Test 05/12/25  1140 05/05/25  1102   AST 29 20   ALT 47 14   ALKPHOS 124 101   BILITOTAL 0.3 0.3     Most Recent ESR & CRP:  Recent Labs   Lab Test 05/12/25  1140 04/28/25  1213 04/15/25  0613 06/13/23  1820 08/26/19  2331   SED  --   --  61*  --  78*   CRP  --   --   --   --  180.0*   CRPI <3.00   < >  --    < >  --     < > = values in this interval not displayed.     Most Recent Anemia Panel:  Recent Labs   Lab Test 05/12/25  1140 04/14/25  1410 04/07/25  0945 09/04/24  1008 06/15/23  1028   WBC 12.7*   < > 6.5   < > 7.6   HGB 10.0*   < > 9.1*   < > 12.1   HCT 32.1*   < > 30.0*   < > 38.4   MCV 89   < > 88   < > 92      < > 351   < > 345   IRON  --   --  36*   < >  --    IRONSAT  --   --  13*   < >  --    FEB  --   --  277   < >  --    LAURA  --   --  44   < >  --    B12  --   --   --   --  456    < > = values in this interval not displayed.       ASSESSMENT/PLAN:  sepsis 2/2 intraabdominal infection - resolved  Recurrent UTIs on IV ertapenem since 3/9/2024  Recent Hx of E. Faecalis bacteremia  Sigmoid microperforation  Leukocytosis  Comment: Admitted for generalized weakness likely 2/2 infection. Found to have intraabdominal infection secondary to a sigmoid microperforation. Recent admission in March 2025 for Klebsiella pneumonia UTI and E. Faecalis bacteremia with recommendations from ID for ertapenem 1g q24h until EOT 4/22. RVP negative.Abdominal CT scan scheduled as directed for 5/5/25 at 1220pm--results as reviewed above--Urine  screen assessed twice with negative bacteria.   Plan:  -Follow up with ID as directed. Scheduled for 5/28/25  -Follow up with colorectal surgery as directed. Scheduled for 5/27/25 to further discuss sigmoidectomy needs  -Follow up with urology as directed. Scheduled for 6/10/25  -Repeat abdominal CT scan scheduled for 5/22/25--patient updated  -Continue vaginal estradiol MWF at HS  -Benadryl PRN--Administer Benadryl prn 30 min. before administration of IV antibiotic Tigecycline.  -Monitor for worsening s/symptoms of concerns  -Per ID on 5/8/25 IV abc course from switched from ertapenem to now Tigecycline 50mg IV BID until 5/2825  -CMP, CBC with diff and CRP due weekly on Mondays as directed until 6/2/25. Fax results to [phone] AUSTYN Del Rio  -Abdominal CT scan rescheduled for 5/22/25     Liver transplant in 2002 2/2 primary biliary cirrhosis  Comment: Chronic. Follows Dr. Ramirez.   Plan:  -Continue PTA sirolimus 1 mg every day  -Reduced prednisone down to 9mg daily for now. (5mg with 4 tablets of 1mg=9mg total)  -Continue PTA ursodiol 250 mg BID  -Follow up with transplant team as directed. Scheduled for 5/28/25  -CMP, CBC with diff and CRP due weekly on Mondays as directed with no end date at this time. Fax results to [phone] AUSTYN Del Rio     Depression   Comment: Chronic. Reports feeling more down and depressed with little interest in doing things. Has a therapist in outpatient setting and has been on anti-depressants historically.   Plan:  -Monitor mood and behaviors  -Monitor for worsening s/symptoms of concerns  -Monitor for changes in mobility, eating and sleeping patterns  -Continue zoloft 50mg daily.   -Follow up with psych post TCU  -CMP, CBC with diff and CRP due weekly on Mondays as directed with no end date at this time. Fax results to [phone] AUSTYN Del Rio     Mild Obstructive Sleep Apnea   Nocturnal Hypoxemia   Comment: Chronic. Sleep study in 2018 showing mild  RASHIDA with recommendation to start CPAP. IT does not appear that there was further follow up and not using CPAP.  Plan:  -Monitor sleeping patterns  -Monitor respiratory status  -PCP to consider sleep medicine referral  -CMP, CBC with diff and CRP due weekly on Mondays as directed with no end date at this time. Fax results to [phone] ATTN Sindhu Del Rio     CKD3b w/ moderate proteinuria, at baseline  HTN  CVD/HLD  paroxysmal atrial fibrillation  Comment: Chronic. Baseline Cr. 1.5. History of dialysis at time of liver transplant 23 years ago. Recurrent AKIs and immunosuppressive medication toxicity. Not on SGLT2i 2/2 recurrent UTI. Chart review w/ unclear heart failure history, unconfirmed with EF 60-65% on 3/10/25. BNP 1446 elevated from 1 month ago. Given unclear HF history, trace bilateral LE edema, with some pulm edema noted on imaging, consideration for volume overload. However, without orthopnea or clear cough.   Plan:  -Continue amlodipine 5mg daily. HOLD if SBP<100  -Continue Meclizine 25mg TID PRN  -Continue PTA hydralazine 50mg TID. Hold for SBP <100mmHg.   -Continue PTA metoprolol succinate 25mg every day. HOLD if SBP<100 and/or HR<55  -Monitor BP and HR  -HOLD PTA evolocumab q2week. Follow up with PCP post TCU to resume. Resume off while on ANTIBIOTIC course-due 5/8 or later  -Continue PTA eztimibe 10mg every day  -Continue reduced apixaban 2.5mg BID ongoing for now given vaginal bleeding risks  -CMP, CBC with diff and CRP due weekly on Mondays as directed with no end date at this time. Fax results to [phone] ATTN Sindhu Del Rio         T2DM  (A1c 6.8% 4/14/25)  Comment: Chronic. Last A1c 6.8% on 4/14/25.   Plan:  -Continue PTA linagliptin 5mg every day  -Continue PTA gabapentin 300mg at bedtime  -Blood Glucose monitoring back to previous schedule of BID. HOLD off on using freestyle phil 2 sensor while on TCU  -CMP, CBC with diff and CRP due weekly on Mondays as directed with no end date at  this time. 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 Wound does not look infected. Today 5/7:She does report having skin lesion to left anterior forearm. 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 Will defer to dermatology--next visit 5/13/25.   Plan:  -Monitor for worsening s/symptoms of concerns  -Follow up with dermatology regarding mass noted to left anterior forearm. Dermatology requested for PET scan which is scheduled for 5/29/25     anemia, chronic  Comment: Chronic. Hgb 9.9 on admit. Stable.  Plan:  -Monitor bleeding risks  -Continue PTA ferrous sulfate daily  -Continue folic acid daily  -CMP, CBC with diff and CRP due weekly on Mondays as directed with no end date at this time. Fax results to [phone] AUSTYN Del Rio     Hypothyroidism  Papillary thyroid carcinoma  Chronic. Recent TSH~1.72 on 4/4/25  Plan;  -Continue PTA levothyroxine 175mcg qD   -Monitor for worsening s/symptoms of concerns  -CMP, CBC with diff and CRP due weekly on Mondays as directed with no end date at this time. Fax results to [phone] AUSTYN Del Rio     Chronic Shoulder Pain  Osteoarthritis   Lower back pain  Comment: Chronic. stable  Plan:  -Monitor pain complaints  -Continue diclofenac gel application to painful areas QID  -Continue Tylenol 1000mg BID   -Continue gabapentin 300mg at HS--only able to tolerate liquid given gelatin capsule allergy  -CMP, CBC with diff and CRP due weekly on Mondays as directed with no end date at this time. Fax results to [phone] AUSTYN Del Rio     Constipation   Comment: Chronic. S/T polypharmacy  Plan:  -Monitor BM patterns  -Continue beneFiber qd    -Zofran PRN     Epistaxis  Comment: Acute on chronic. She reports occasional nose bleed at times. No episodes while on  TCU. She reports history of good relief with pressure and ice application  Plan:  -Monitor bleeding risks  -Monitor for worsening s/symptoms of concerns  -Afrin BID PRN on TCU if warranted     Vaginal bleeding  Comment: Acute on chronic. 3 episodes of painless vaginal bleeding noted since TCU admission. Urine screens x 2 negative. Last menstrual period she reports has been 40 years ago or so. Known family history of endometrial cancer that metastasized to bowel per her reports. She reports her mother had extensive genetic testing for review in EPIC. Reports mother name: Anne Yap  3/9/23 for reference if indicated. Denies any further vaginal bleeding at this time.   Plan:  -urine was negative for concerns or evidence of blood  -Continue reduced apixaban dose of 2.5mg BID for now given vaginal bleeding risks  -Urgent referral for OBGYN referral in place for further testing needs---Scheduled for 25  -CMP, CBC with diff and CRP due weekly on  as directed with no end date at this time. Fax results to [phone] ATTN Sindhu Del Rio     Physical deconditioning  Generalized muscle weakness  Comment: Acute on chronic. Known poor history PTA. [facility] given concerns. Per therapy-Ambulates up to 200ft with upwalker. SBA for most needs and LB cares. 26/30 on the SLUMS, just below normal indicating MNCD  Plan:  -Continue Physical therapy and Occupational therapy  -Recommend cognitive testing on site  -Highly recommend MIKEY compliance post TCU     Electronically signed by:  Dr. Manda Flor DNP, APRN, FNP-C, WCS-C, EDS-C     Provider reviewed records from facility, and interpreted most recent imaging/lab work, and vital signs.   Acute and chronic conditions managed by writer. Have been reviewed during today's exam         Sincerely,        Manda Flor, LIZ CNP    Electronically signed

## 2025-05-15 VITALS
OXYGEN SATURATION: 96 % | HEIGHT: 67 IN | RESPIRATION RATE: 18 BRPM | DIASTOLIC BLOOD PRESSURE: 55 MMHG | HEART RATE: 57 BPM | BODY MASS INDEX: 29.07 KG/M2 | TEMPERATURE: 98.1 F | WEIGHT: 185.2 LBS | SYSTOLIC BLOOD PRESSURE: 131 MMHG

## 2025-05-15 NOTE — PROGRESS NOTES
[facility] GERIATRICS    Chief Complaint   Patient presents with    RECHECK     HPI:  Luz Thompson is a 75 year old  (1950), who is being seen today for an episodic care visit at: [facility] ([facility])([facility]) [[id number]]. Today's concern is: The primary encounter diagnosis was Intra-abdominal infection. Diagnoses of Physical deconditioning, Slow transit constipation, Generalized muscle weakness, Age-related osteoporosis without current pathological fracture, Chronic shoulder pain, unspecified laterality, Bilateral low back pain without sciatica, unspecified chronicity, Chronic anemia, 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, Hypothyroidism, unspecified type, S/P Mohs surgery for basal cell carcinoma, Hyperlipidemia LDL goal <70, Chronic diastolic heart failure (H), Stage 3b chronic kidney disease (H), RASHIDA (obstructive sleep apnea), Type 2 diabetes mellitus with stage 3b chronic kidney disease, without long-term current use of insulin (H), Depression, unspecified depression type, PAF (paroxysmal atrial fibrillation) (H), Status post liver transplantation (H), Hypertension goal BP (blood pressure) < 140/80, Abnormal liver function tests, Nocturnal hypoxemia, Immunocompromised, Hx of sepsis, Hx of bacteremia, Seborrheic dermatitis, Papillary thyroid carcinoma (H), Epistaxis, Vaginal bleeding, Benign essential hypertension, and Dizziness were also pertinent to this visit.    ***    BP Readings from Last 3 Encounters:   05/15/25 131/55   05/13/25 127/59   05/12/25 (!) 155/76     Wt Readings from Last 5 Encounters:   05/15/25 84 kg (185 lb 3.2 oz)   05/13/25 84 kg (185 lb 3.2 oz)   05/12/25 84 kg (185 lb 3.2 oz)   05/07/25 82.1 kg (181 lb)   05/05/25 82.1 kg (181 lb)     Allergies, and PMH/PSH reviewed in EPIC today.  REVIEW OF SYSTEMS:  4 point ROS including Respiratory, CV, GI and , other than that noted in the HPI,  is negative    Objective:   /55   Pulse 57   Temp  "98.1  F (36.7  C)   Resp 18   Ht 1.702 m (5' 7\")   Wt 84 kg (185 lb 3.2 oz)   LMP 06/01/1988 (Approximate)   SpO2 96%   BMI 29.01 kg/m    GENERAL APPEARANCE:  {care home GENERAL APPEARANCE:631188}  ENT:  {care home ENT PE:896289::\"Mouth and posterior oropharynx normal, moist mucous membranes\"}  EYES:  {care home EYES PE :533144::\"EOM, conjunctivae, lids, pupils and irises normal\"}  NECK:  {NURSING HOME NECK PE:055630::\"No adenopathy,masses or thyromegaly\"}  RESP:  {care home RESP PE:592496}  CV:  {care home CV PE:467822}  ABDOMEN:  {care home GI PE:438599::\"normal bowel sounds, soft, nontender, no hepatosplenomegaly or other masses\"}  M/S:   {care home M/S PE:944108}  SKIN:  {NURSING HOME SKIN PE:362271}  NEURO:   {care home NEURO PE:736853}  PSYCH:  {care home PSYCH PE:528032}    Labs done in SNF are in [facility]. Please refer to them using [employer] Everywhere. and Recent labs in EPIC reviewed by me today.     ASSESSMENT/PLAN:  sepsis 2/2 intraabdominal infection - resolved  Recurrent UTIs on IV ertapenem since 3/9/2024  Recent Hx of E. Faecalis bacteremia  Sigmoid microperforation  Leukocytosis  Comment: Admitted for generalized weakness likely 2/2 infection. Found to have intraabdominal infection secondary to a sigmoid microperforation. Recent admission in March 2025 for Klebsiella pneumonia UTI and E. Faecalis bacteremia with recommendations from ID for ertapenem 1g q24h until EOT 4/22. RVP negative.Abdominal CT scan scheduled as directed for 5/5/25 at 1220pm--results as reviewed above--Urine screen assessed twice with negative bacteria.   Plan:  -Follow up with ID as directed. Scheduled for 5/28/25  -Follow up with colorectal surgery as directed. Scheduled for 5/27/25 to further discuss sigmoidectomy needs  -Follow up with urology as directed. Scheduled for 6/10/25  -Repeat abdominal CT scan scheduled for 5/22/25  -Continue vaginal estradiol MWF at HS  -Benadryl " PRN--Administer Benadryl prn 30 min. before administration of IV antibiotic Tigecycline.  -Monitor for worsening s/symptoms of concerns  -Per ID on 5/8/25 IV abc course from switched from ertapenem to now Tigecycline 50mg IV BID until 5/2825  -CMP, CBC with diff and CRP due weekly on Mondays as directed until 6/2/25. Fax results to [phone] AUSTYN Del Rio     Liver transplant in 2002 2/2 primary biliary cirrhosis  Comment: Chronic. Follows Dr. Ramirez.   Plan:  -Continue PTA sirolimus 1 mg every day  -Continue prednisone 9mg daily for now. (5mg with 4 tablets of 1mg=9mg total)  -Continue PTA ursodiol 250 mg BID  -Follow up with transplant team as directed. Scheduled for 5/28/25  -CMP, CBC with diff and CRP due weekly on Mondays as directed with no end date at this time. Fax results to [phone] AUSTYN Del [place]     Depression   Comment: Chronic. Reports feeling more down and depressed with little interest in doing things. Has a therapist in outpatient setting and has been on anti-depressants historically.   Plan:  -Monitor mood and behaviors  -Monitor for worsening s/symptoms of concerns  -Monitor for changes in mobility, eating and sleeping patterns  -Continue zoloft 50mg daily.   -Follow up with psych post TCU  -CMP, CBC with diff and CRP due weekly on Mondays as directed with no end date at this time. Fax results to [phone] AUSTYN Del Rio     Mild Obstructive Sleep Apnea   Nocturnal Hypoxemia   Comment: Chronic. Sleep study in 2018 showing mild RASHIDA with recommendation to start CPAP. IT does not appear that there was further follow up and not using CPAP.  Plan:  -Monitor sleeping patterns  -Monitor respiratory status  -PCP to consider sleep medicine referral  -CMP, CBC with diff and CRP due weekly on Mondays as directed with no end date at this time. Fax results to [phone] AUSTYN Del Rio     CKD3b w/ moderate proteinuria, at baseline  HTN  CVD/HLD  paroxysmal atrial  fibrillation  Comment: Chronic. Baseline Cr. 1.5. History of dialysis at time of liver transplant 23 years ago. Recurrent AKIs and immunosuppressive medication toxicity. Not on SGLT2i 2/2 recurrent UTI. Chart review w/ unclear heart failure history, unconfirmed with EF 60-65% on 3/10/25. BNP 1446 elevated from 1 month ago. Given unclear HF history, trace bilateral LE edema, with some pulm edema noted on imaging, consideration for volume overload. However, without orthopnea or clear cough.   Plan:  -Continue amlodipine 5mg daily. HOLD if SBP<100  -Continue Meclizine 25mg TID PRN  -Continue PTA hydralazine 50mg TID. Hold for SBP <100mmHg.   -Continue PTA metoprolol succinate 25mg every day. HOLD if SBP<100 and/or HR<55  -Monitor BP and HR  -HOLD PTA evolocumab q2week. Follow up with PCP post TCU to resume. Resume off while on ANTIBIOTIC course-due 5/8 or later  -Continue PTA eztimibe 10mg every day  -Continue reduced apixaban 2.5mg BID ongoing for now given vaginal bleeding risks  -CMP, CBC with diff and CRP due weekly on Mondays as directed with no end date at this time. Fax results to [phone] ATTN Sindhu Del Rio     T2DM  (A1c 6.8% 4/14/25)  Comment: Chronic. Last A1c 6.8% on 4/14/25.   Plan:  -Continue PTA linagliptin 5mg every day  -Continue PTA gabapentin 300mg at bedtime  -Blood Glucose monitoring back to previous schedule of BID. HOLD off on using freestyle phil 2 sensor while on TCU  -CMP, CBC with diff and CRP due weekly on Mondays as directed with no end date at this time. Fax results to [phone] ATTN Sindhu Del Rio     L cheek SCC s/p MOHS 4/8/25  Skin lesion  Comment: Tumor debulk with perineural invasion w/ pending Tumor Board for consideration of radiation therapy. Wound does not look infected. Today 5/7:She does report having skin lesion to left anterior forearm. 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 Will defer to dermatology--next visit 5/13/25.    Plan:  -Monitor for worsening s/symptoms of concerns  -Follow up with dermatology regarding mass noted to left anterior forearm. Dermatology requested for PET scan which is scheduled for 5/29/25     anemia, chronic  Comment: Chronic. Hgb 9.9 on admit. Stable.  Plan:  -Monitor bleeding risks  -Continue PTA ferrous sulfate daily  -Continue folic acid daily  -CMP, CBC with diff and CRP due weekly on Mondays as directed with no end date at this time. Fax results to [phone] AUSTYN Del Rio     Hypothyroidism  Papillary thyroid carcinoma  Chronic. Recent TSH~1.72 on 4/4/25  Plan;  -Continue PTA levothyroxine 175mcg qD   -Monitor for worsening s/symptoms of concerns  -CMP, CBC with diff and CRP due weekly on Mondays as directed with no end date at this time. Fax results to [phone] AUSTYN Del Rio     Chronic Shoulder Pain  Osteoarthritis   Lower back pain  Comment: Chronic. stable  Plan:  -Monitor pain complaints  -Continue diclofenac gel application to painful areas QID  -Continue Tylenol 1000mg BID   -Continue gabapentin 300mg at HS--only able to tolerate liquid given gelatin capsule allergy  -CMP, CBC with diff and CRP due weekly on Mondays as directed with no end date at this time. Fax results to [phone] AUSTYN Del Rio     Constipation   Comment: Chronic. S/T polypharmacy  Plan:  -Monitor BM patterns  -Continue beneFiber qd    -Zofran PRN     Epistaxis  Comment: Acute on chronic. She reports occasional nose bleed at times. No episodes while on TCU.   Plan:  -Monitor bleeding risks  -Monitor for worsening s/symptoms of concerns  -Afrin BID PRN on TCU if warranted     Vaginal bleeding  Comment: Acute on chronic. 3 episodes of painless vaginal bleeding noted since TCU admission. Urine screens x 2 negative. Last menstrual period she reports has been 40 years ago or so. Known family history of endometrial cancer that metastasized to bowel per her reports. She reports her mother had  extensive genetic testing for review in EPIC. Reports mother name: Anne Yap  3/9/23 for reference if indicated. Denies any further vaginal bleeding at this time.   Plan:  -urine was negative for concerns or evidence of blood  -Continue reduced apixaban dose of 2.5mg BID for now given vaginal bleeding risks  -Urgent referral for OBGYN referral in place for further testing needs---Scheduled for 25  -CMP, CBC with diff and CRP due weekly on  as directed with no end date at this time. Fax results to [phone] ATTN Sindhu Del Rio     Physical deconditioning  Generalized muscle weakness  Comment: Acute on chronic. Known poor history PTA. [facility] given concerns. Per therapy-Ambulates up to 200ft with upwalker. SBA for most needs and LB cares.  on the SLUMS, just below normal indicating MNCD  Plan:  -Continue Physical therapy and Occupational therapy  -Recommend cognitive testing on site  -Highly recommend MIKEY compliance post TCU     Electronically signed by:  Dr. Manda Flor DNP, APRN, FNP-C, WCS-C, EDS-C     Provider reviewed records from facility, and interpreted most recent imaging/lab work, and vital signs.   Acute and chronic conditions managed by writer. Have been reviewed during today's exam

## 2025-05-16 ENCOUNTER — TRANSITIONAL CARE UNIT VISIT (OUTPATIENT)
Dept: GERIATRICS | Facility: CLINIC | Age: 75
End: 2025-05-16
Payer: MEDICARE

## 2025-05-16 ENCOUNTER — LAB REQUISITION (OUTPATIENT)
Dept: LAB | Facility: CLINIC | Age: 75
End: 2025-05-16
Payer: MEDICARE

## 2025-05-16 DIAGNOSIS — I15.2 HYPERTENSION SECONDARY TO ENDOCRINE DISORDERS: ICD-10-CM

## 2025-05-16 DIAGNOSIS — K59.01 SLOW TRANSIT CONSTIPATION: ICD-10-CM

## 2025-05-16 DIAGNOSIS — L21.9 SEBORRHEIC DERMATITIS: ICD-10-CM

## 2025-05-16 DIAGNOSIS — Z85.828 S/P MOHS SURGERY FOR BASAL CELL CARCINOMA: ICD-10-CM

## 2025-05-16 DIAGNOSIS — F32.A DEPRESSION, UNSPECIFIED DEPRESSION TYPE: ICD-10-CM

## 2025-05-16 DIAGNOSIS — I10 BENIGN ESSENTIAL HYPERTENSION: ICD-10-CM

## 2025-05-16 DIAGNOSIS — Z87.898 HX OF BACTEREMIA: ICD-10-CM

## 2025-05-16 DIAGNOSIS — Z98.890 S/P MOHS SURGERY FOR BASAL CELL CARCINOMA: ICD-10-CM

## 2025-05-16 DIAGNOSIS — I48.0 PAF (PAROXYSMAL ATRIAL FIBRILLATION) (H): ICD-10-CM

## 2025-05-16 DIAGNOSIS — N93.9 VAGINAL BLEEDING: ICD-10-CM

## 2025-05-16 DIAGNOSIS — N18.30 CHRONIC KIDNEY DISEASE, STAGE 3 UNSPECIFIED (H): ICD-10-CM

## 2025-05-16 DIAGNOSIS — E78.5 HYPERLIPIDEMIA LDL GOAL <70: ICD-10-CM

## 2025-05-16 DIAGNOSIS — D64.9 CHRONIC ANEMIA: ICD-10-CM

## 2025-05-16 DIAGNOSIS — Z86.19 HX OF SEPSIS: ICD-10-CM

## 2025-05-16 DIAGNOSIS — M25.519 CHRONIC SHOULDER PAIN, UNSPECIFIED LATERALITY: ICD-10-CM

## 2025-05-16 DIAGNOSIS — I10 HYPERTENSION GOAL BP (BLOOD PRESSURE) < 140/80: ICD-10-CM

## 2025-05-16 DIAGNOSIS — Z94.4 STATUS POST LIVER TRANSPLANTATION (H): ICD-10-CM

## 2025-05-16 DIAGNOSIS — R79.89 ABNORMAL LIVER FUNCTION TESTS: ICD-10-CM

## 2025-05-16 DIAGNOSIS — G89.29 CHRONIC SHOULDER PAIN, UNSPECIFIED LATERALITY: ICD-10-CM

## 2025-05-16 DIAGNOSIS — M81.0 AGE-RELATED OSTEOPOROSIS WITHOUT CURRENT PATHOLOGICAL FRACTURE: ICD-10-CM

## 2025-05-16 DIAGNOSIS — G47.34 NOCTURNAL HYPOXEMIA: ICD-10-CM

## 2025-05-16 DIAGNOSIS — R42 DIZZINESS: ICD-10-CM

## 2025-05-16 DIAGNOSIS — K65.1 PERITONEAL ABSCESS (H): ICD-10-CM

## 2025-05-16 DIAGNOSIS — G47.33 OSA (OBSTRUCTIVE SLEEP APNEA): ICD-10-CM

## 2025-05-16 DIAGNOSIS — M62.81 GENERALIZED MUSCLE WEAKNESS: ICD-10-CM

## 2025-05-16 DIAGNOSIS — N18.32 STAGE 3B CHRONIC KIDNEY DISEASE (H): ICD-10-CM

## 2025-05-16 DIAGNOSIS — E03.9 HYPOTHYROIDISM, UNSPECIFIED TYPE: ICD-10-CM

## 2025-05-16 DIAGNOSIS — C44.329 SQUAMOUS CELL CANCER OF SKIN OF LEFT CHEEK: ICD-10-CM

## 2025-05-16 DIAGNOSIS — D64.9 ANEMIA, UNSPECIFIED: ICD-10-CM

## 2025-05-16 DIAGNOSIS — N18.32 TYPE 2 DIABETES MELLITUS WITH STAGE 3B CHRONIC KIDNEY DISEASE, WITHOUT LONG-TERM CURRENT USE OF INSULIN (H): ICD-10-CM

## 2025-05-16 DIAGNOSIS — R04.0 EPISTAXIS: ICD-10-CM

## 2025-05-16 DIAGNOSIS — R53.81 PHYSICAL DECONDITIONING: ICD-10-CM

## 2025-05-16 DIAGNOSIS — C73 PAPILLARY THYROID CARCINOMA (H): ICD-10-CM

## 2025-05-16 DIAGNOSIS — D84.9 IMMUNOCOMPROMISED: ICD-10-CM

## 2025-05-16 DIAGNOSIS — K65.0: ICD-10-CM

## 2025-05-16 DIAGNOSIS — I50.32 CHRONIC DIASTOLIC HEART FAILURE (H): ICD-10-CM

## 2025-05-16 DIAGNOSIS — E11.22 TYPE 2 DIABETES MELLITUS WITH STAGE 3B CHRONIC KIDNEY DISEASE, WITHOUT LONG-TERM CURRENT USE OF INSULIN (H): ICD-10-CM

## 2025-05-16 DIAGNOSIS — M54.50 BILATERAL LOW BACK PAIN WITHOUT SCIATICA, UNSPECIFIED CHRONICITY: ICD-10-CM

## 2025-05-16 DIAGNOSIS — I10 ESSENTIAL (PRIMARY) HYPERTENSION: ICD-10-CM

## 2025-05-16 DIAGNOSIS — B99.9 INTRA-ABDOMINAL INFECTION: Primary | ICD-10-CM

## 2025-05-16 NOTE — LETTER
5/16/2025      Luz Thompson  [address] [address]  [place] [id number]        No notes on file      Sincerely,        LIZ Obando CNP    Electronically signed

## 2025-05-19 VITALS
SYSTOLIC BLOOD PRESSURE: 122 MMHG | BODY MASS INDEX: 28.82 KG/M2 | WEIGHT: 183.6 LBS | OXYGEN SATURATION: 97 % | HEIGHT: 67 IN | DIASTOLIC BLOOD PRESSURE: 72 MMHG | TEMPERATURE: 97.7 F | RESPIRATION RATE: 18 BRPM | HEART RATE: 59 BPM

## 2025-05-19 LAB
ALBUMIN SERPL BCG-MCNC: 3 G/DL (ref 3.5–5.2)
ALP SERPL-CCNC: 118 U/L (ref 40–150)
ALT SERPL W P-5'-P-CCNC: 22 U/L (ref 0–50)
ANION GAP SERPL CALCULATED.3IONS-SCNC: 12 MMOL/L (ref 7–15)
AST SERPL W P-5'-P-CCNC: 14 U/L (ref 0–45)
BASOPHILS # BLD AUTO: 0 10E3/UL (ref 0–0.2)
BASOPHILS NFR BLD AUTO: 0 %
BILIRUB SERPL-MCNC: 0.3 MG/DL
BUN SERPL-MCNC: 90.6 MG/DL (ref 8–23)
CALCIUM SERPL-MCNC: 8.7 MG/DL (ref 8.8–10.4)
CHLORIDE SERPL-SCNC: 107 MMOL/L (ref 98–107)
CREAT SERPL-MCNC: 2.14 MG/DL (ref 0.51–0.95)
CRP SERPL-MCNC: <3 MG/L
EGFRCR SERPLBLD CKD-EPI 2021: 23 ML/MIN/1.73M2
EOSINOPHIL # BLD AUTO: 0.1 10E3/UL (ref 0–0.7)
EOSINOPHIL NFR BLD AUTO: 1 %
ERYTHROCYTE [DISTWIDTH] IN BLOOD BY AUTOMATED COUNT: 18.5 % (ref 10–15)
GLUCOSE SERPL-MCNC: 88 MG/DL (ref 70–99)
HCO3 SERPL-SCNC: 19 MMOL/L (ref 22–29)
HCT VFR BLD AUTO: 30 % (ref 35–47)
HGB BLD-MCNC: 9.2 G/DL (ref 11.7–15.7)
IMM GRANULOCYTES # BLD: 0.1 10E3/UL
IMM GRANULOCYTES NFR BLD: 1 %
LYMPHOCYTES # BLD AUTO: 1.3 10E3/UL (ref 0.8–5.3)
LYMPHOCYTES NFR BLD AUTO: 13 %
MCH RBC QN AUTO: 27.6 PG (ref 26.5–33)
MCHC RBC AUTO-ENTMCNC: 30.7 G/DL (ref 31.5–36.5)
MCV RBC AUTO: 90 FL (ref 78–100)
MONOCYTES # BLD AUTO: 1 10E3/UL (ref 0–1.3)
MONOCYTES NFR BLD AUTO: 10 %
NEUTROPHILS # BLD AUTO: 7.4 10E3/UL (ref 1.6–8.3)
NEUTROPHILS NFR BLD AUTO: 75 %
NRBC # BLD AUTO: 0 10E3/UL
NRBC BLD AUTO-RTO: 0 /100
PLATELET # BLD AUTO: 357 10E3/UL (ref 150–450)
POTASSIUM SERPL-SCNC: 5.2 MMOL/L (ref 3.4–5.3)
PROT SERPL-MCNC: 5.4 G/DL (ref 6.4–8.3)
RBC # BLD AUTO: 3.33 10E6/UL (ref 3.8–5.2)
SODIUM SERPL-SCNC: 138 MMOL/L (ref 135–145)
WBC # BLD AUTO: 9.9 10E3/UL (ref 4–11)

## 2025-05-19 PROCEDURE — 36415 COLL VENOUS BLD VENIPUNCTURE: CPT | Performed by: NURSE PRACTITIONER

## 2025-05-19 PROCEDURE — 85025 COMPLETE CBC W/AUTO DIFF WBC: CPT | Performed by: NURSE PRACTITIONER

## 2025-05-19 PROCEDURE — 80053 COMPREHEN METABOLIC PANEL: CPT | Performed by: NURSE PRACTITIONER

## 2025-05-19 PROCEDURE — 86140 C-REACTIVE PROTEIN: CPT | Performed by: NURSE PRACTITIONER

## 2025-05-19 PROCEDURE — 85004 AUTOMATED DIFF WBC COUNT: CPT | Performed by: NURSE PRACTITIONER

## 2025-05-19 NOTE — PROGRESS NOTES
[facility] GERIATRICS    Chief Complaint   Patient presents with    RECHECK     HPI:  Luz Thompson is a 75 year old  (1950), who is being seen today for an episodic care visit at: [facility] ([facility]) [[id number]]. Today's concern is: The primary encounter diagnosis was Intra-abdominal infection. Diagnoses of Slow transit constipation, Physical deconditioning, Generalized muscle weakness, Age-related osteoporosis without current pathological fracture, Chronic shoulder pain, unspecified laterality, Bilateral low back pain without sciatica, unspecified chronicity, Chronic anemia, 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, Hypothyroidism, unspecified type, S/P Mohs surgery for basal cell carcinoma, Hyperlipidemia LDL goal <70, Chronic diastolic heart failure (H), Status post liver transplantation (H), Stage 3b chronic kidney disease (H), RASHIDA (obstructive sleep apnea), Type 2 diabetes mellitus with stage 3b chronic kidney disease, without long-term current use of insulin (H), Depression, unspecified depression type, PAF (paroxysmal atrial fibrillation) (H), Hypertension goal BP (blood pressure) < 140/80, Abnormal liver function tests, Nocturnal hypoxemia, Immunocompromised, Hx of sepsis, Hx of bacteremia, Seborrheic dermatitis, Papillary thyroid carcinoma (H), Epistaxis, Vaginal bleeding, Benign essential hypertension, Dizziness, Gastroesophageal reflux disease without esophagitis, and Nausea were also pertinent to this visit.    Noted per staff this early AM of fall. Met with patient who was found lying in bed. She reports during her fall she was in the bathroom. She was attempting to use wipes to cleanse herself and the wipes fell to the floor, while attempting to pick them up off the floor, she fell onto buttocks. Denies hitting her head. Minor skin tear to right forearm S/T name band causing trauma to skin during fall. She denies any chest pain, palpitations, shortness  "of breath, [place], lightheadedness, dizziness, or cough. She occasionally reports abdominal discomfort with nausea complaints.  Requested for TUMS this past weekend with good relief. She reports incontinence or urine. Episode of vaginal bleeding was noted this early AM. Denies any further bleeding at this time. Denies dysuria or frequency, but noted cloudy urine appearance. She would like urine testing today, although previous 2 times were negative. She is tolerating antibiotic course well without allergies. No rashes or itch complaints. She denies loose or constipation. Appetite fair. Sleeping well.     BP Readings from Last 3 Encounters:   05/19/25 122/72   05/15/25 131/55   05/13/25 127/59     Wt Readings from Last 5 Encounters:   05/19/25 83.3 kg (183 lb 9.6 oz)   05/15/25 84 kg (185 lb 3.2 oz)   05/13/25 84 kg (185 lb 3.2 oz)   05/12/25 84 kg (185 lb 3.2 oz)   05/07/25 82.1 kg (181 lb)     Allergies, and PMH/PSH reviewed in EPIC today.  REVIEW OF SYSTEMS:  4 point ROS including Respiratory, CV, GI and , other than that noted in the HPI,  is negative    Objective:   /72   Pulse 59   Temp 97.7  F (36.5  C)   Resp 18   Ht 1.702 m (5' 7\")   Wt 83.3 kg (183 lb 9.6 oz)   LMP 06/01/1988 (Approximate)   SpO2 97%   BMI 28.76 kg/m    GENERAL APPEARANCE:  Alert, in no distress, cooperative  ENT:  Mouth and posterior oropharynx normal, moist mucous membranes, Craig  EYES:  EOM, conjunctivae, lids, pupils and irises normal  NECK:  No adenopathy,masses or thyromegaly  RESP:  respiratory effort and palpation of chest normal, lungs clear to auscultation , no respiratory distress  CV:  regular rate and rhythm, no murmur, rub, or gallop, peripheral edema 1+ in BLE  ABDOMEN:  normal bowel sounds, soft, nontender, no hepatosplenomegaly or other masses, no guarding or rebound  M/S:   Ambulates with upwalker. Staff to assist for ADLs, transfers and cares  SKIN:  Inspection of skin and subcutaneous tissue baseline, small " skin tear noted to right wrist S/T recent fall  NEURO:   Cranial nerves 2-12 are normal tested and grossly at patient's baseline, no purposeful movement in upper and lower extremities  PSYCH:  oriented X 3, memory impaired , affect and mood normal    Most Recent 3 CBC's:  Recent Labs   Lab Test 05/19/25  1037 05/12/25  1140 05/05/25  1102   WBC 9.9 12.7* 15.6*   HGB 9.2* 10.0* 10.1*   MCV 90 89 90    413 400     Most Recent 3 BMP's:  Recent Labs   Lab Test 05/19/25  1037 05/12/25  1140 05/05/25  1102    137 140   POTASSIUM 5.2 5.0 4.7   CHLORIDE 107 102 103   CO2 19* 19* 22   BUN 90.6* 79.3* 44.3*   CR 2.14* 1.79* 1.54*   ANIONGAP 12 16* 15   KEILY 8.7* 9.0 9.3   GLC 88 74 93     Most Recent 2 LFT's:  Recent Labs   Lab Test 05/19/25  1037 05/12/25  1140   AST 14 29   ALT 22 47   ALKPHOS 118 124   BILITOTAL 0.3 0.3     Most Recent ESR & CRP:  Recent Labs   Lab Test 05/19/25  1037 04/28/25  1213 04/15/25  0613 06/13/23  1820 08/26/19  2331   SED  --   --  61*  --  78*   CRP  --   --   --   --  180.0*   CRPI <3.00   < >  --    < >  --     < > = values in this interval not displayed.     Most Recent Anemia Panel:  Recent Labs   Lab Test 05/19/25  1037 04/14/25  1410 04/07/25  0945 09/04/24  1008 06/15/23  1028   WBC 9.9   < > 6.5   < > 7.6   HGB 9.2*   < > 9.1*   < > 12.1   HCT 30.0*   < > 30.0*   < > 38.4   MCV 90   < > 88   < > 92      < > 351   < > 345   IRON  --   --  36*   < >  --    IRONSAT  --   --  13*   < >  --    FEB  --   --  277   < >  --    LAURA  --   --  44   < >  --    B12  --   --   --   --  456    < > = values in this interval not displayed.       ASSESSMENT/PLAN:  sepsis 2/2 intraabdominal infection - resolved  Recurrent UTIs on IV ertapenem since 3/9/2024  Recent Hx of E. Faecalis bacteremia  Sigmoid microperforation  Leukocytosis  Comment: Admitted for generalized weakness likely 2/2 infection. Found to have intraabdominal infection secondary to a sigmoid microperforation. Recent  admission in March 2025 for Klebsiella pneumonia UTI and E. Faecalis bacteremia with recommendations from ID for ertapenem 1g q24h until EOT 4/22. RVP negative.Abdominal CT scan scheduled as directed for 5/5/25 at 1220pm--results as reviewed above--Urine screen assessed twice with negative bacteria. Today 5/16/25: She does report one episode of incontinence which she reports is a tell tale sign of her having a UTI. Remains Afebrile and current on IV antibiotic course as directed per ID. Tolerating IV ABx course without concerns  Plan:  -Follow up with ID as directed. Scheduled for 5/28/25  -Follow up with colorectal surgery as directed. Scheduled for 5/27/25 to further discuss sigmoidectomy needs  -Follow up with urology as directed. Scheduled for 6/10/25  -Repeat abdominal CT scan scheduled for 5/22/25  -Continue vaginal estradiol MWF at HS  -Collect UA/UC per request. Low suspicions today   -Monitor urinary status  -Benadryl PRN--Administer Benadryl prn 30 min. before administration of IV antibiotic Tigecycline.  -Monitor for worsening s/symptoms of concerns  -Per ID on 5/8/25 IV abc course from switched from ertapenem to now Tigecycline 50mg IV BID until 5/2825  -CMP, CBC with diff and CRP due weekly on Mondays as directed until 6/2/25. Fax results to [phone] AUSTYN [place]     Liver transplant in 2002 2/2 primary biliary cirrhosis  Comment: Chronic. Follows Dr. Ramirez.   Plan:  -Continue PTA sirolimus 1 mg every day  -Continue prednisone 9mg daily for now. (5mg with 4 tablets of 1mg=9mg total)  -Continue PTA ursodiol 250 mg BID  -Follow up with transplant team as directed. Scheduled for 5/28/25  -CMP, CBC with diff and CRP due weekly on Mondays as directed with no end date at this time. Fax results to [phone] ATTDENNIS [place]     Depression   Comment: Chronic. Stable today  Plan:  -Monitor mood and behaviors  -Monitor for worsening s/symptoms of concerns  -Monitor for changes in mobility,  eating and sleeping patterns  -Continue zoloft 50mg daily.   -Follow up with psych post TCU  -CMP, CBC with diff and CRP due weekly on Mondays as directed with no end date at this time. Fax results to [phone] AUSTYN [place]     Mild Obstructive Sleep Apnea   Nocturnal Hypoxemia   Comment: Chronic. Sleep study in 2018 showing mild RASHIDA with recommendation to start CPAP. IT does not appear that there was further follow up and not using CPAP.  Plan:  -Monitor sleeping patterns  -Monitor respiratory status  -PCP to consider sleep medicine referral  -CMP, CBC with diff and CRP due weekly on Mondays as directed with no end date at this time. Fax results to [phone] AUSTYN [place]     CKD3b w/ moderate proteinuria, at baseline  HTN  CVD/HLD  paroxysmal atrial fibrillation  Comment: Chronic. Baseline Cr. 1.5. History of dialysis at time of liver transplant 23 years ago. Recurrent AKIs and immunosuppressive medication toxicity. Not on SGLT2i 2/2 recurrent UTI. Chart review w/ unclear heart failure history, unconfirmed with EF 60-65% on 3/10/25. BNP 1446 elevated from 1 month ago. Given unclear HF history, trace bilateral LE edema, with some pulm edema noted on imaging, consideration for volume overload. However, without orthopnea or clear cough.   Plan:  -Continue amlodipine 5mg daily. HOLD if SBP<100  -Continue Meclizine 25mg TID PRN  -Continue PTA hydralazine 50mg TID. Hold for SBP <100mmHg.   -Continue PTA metoprolol succinate 25mg every day. HOLD if SBP<100 and/or HR<55  -Monitor BP and HR  -HOLD PTA evolocumab q2week. Follow up with PCP post TCU to resume. Resume off while on ANTIBIOTIC course  -Continue PTA eztimibe 10mg every day  -Continue reduced apixaban 2.5mg BID ongoing for now given vaginal bleeding risks  -CMP, CBC with diff and CRP due weekly on Mondays as directed with no end date at this time. Fax results to [phone] AUSTYN [place]     T2DM  (A1c 6.8% 4/14/25)  Comment:  Chronic. Last A1c 6.8% on 4/14/25.   Plan:  -Continue PTA linagliptin 5mg every day  -Continue PTA gabapentin 300mg at bedtime  -Blood Glucose monitoring back to previous schedule of BID. HOLD off on using freestyle phil 2 sensor while on TCU  -CMP, CBC with diff and CRP due weekly on Mondays as directed with no end date at this time. Fax results to [phone] AUSTYN [place]     L cheek SCC s/p MOHS 4/8/25  Skin lesion  Comment: Tumor debulk with perineural invasion w/ pending Tumor Board for consideration of radiation therapy. Wound does not look infected. Today 5/7:She does report having skin lesion to left anterior forearm. 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 Will defer to dermatology  Plan:  -Monitor for worsening s/symptoms of concerns  -Follow up with dermatology regarding mass noted to left anterior forearm. Dermatology requested for PET scan which is scheduled for 5/29/25     anemia, chronic  Comment: Chronic. Hgb 9.9 on admit. Stable.  Plan:  -Monitor bleeding risks  -Continue PTA ferrous sulfate daily  -Continue folic acid daily  -CMP, CBC with diff and CRP due weekly on Mondays as directed with no end date at this time. Fax results to [phone] AUSTYN [place]     Hypothyroidism  Papillary thyroid carcinoma  Chronic. Recent TSH~1.72 on 4/4/25  Plan;  -Continue PTA levothyroxine 175mcg qD   -Monitor for worsening s/symptoms of concerns  -CMP, CBC with diff and CRP due weekly on Mondays as directed with no end date at this time. Fax results to [phone] AUSTYN [place]     Chronic Shoulder Pain  Osteoarthritis   Lower back pain  Comment: Chronic. stable  Plan:  -Monitor pain complaints  -Continue diclofenac gel application to painful areas QID  -Continue Tylenol 1000mg BID   -Continue gabapentin 300mg at HS--only able to tolerate liquid given gelatin capsule allergy  -CMP, CBC with diff and CRP due weekly on Mondays as directed with no end  date at this time. Fax results to [phone] AUSTYN Sindhu [place]     Constipation   Nausea  GERD  Comment: Chronic. S/T polypharmacy. She occasionally reports abdominal discomfort with nausea complaints.  Requested for TUMS this past weekend with good relief. She denies loose or constipation. Appetite fair.   Plan:  -Monitor BM patterns  -change TUMS to 1000mg every 4 hours PRN  -Continue beneFiber qd    -Zofran PRN     Epistaxis  Comment: Acute on chronic. She reports occasional nose bleed at times. No episodes while on TCU.   Plan:  -Monitor bleeding risks  -Monitor for worsening s/symptoms of concerns  -Afrin BID PRN on TCU if warranted     Vaginal bleeding  Comment: Acute on chronic. Now having 4-5 episodes of painless vaginal bleeding noted since TCU admission. Last noted this AM 25. Urine screens x 2 negative. Last menstrual period she reports has been 40 years ago or so. Known family history of endometrial cancer that metastasized to bowel per her reports. She reports her mother had extensive genetic testing for review in EPIC. Reports mother name: Anne Yap  3/9/23 for reference if indicated.   Plan:  -Continue reduced apixaban dose of 2.5mg BID for now given vaginal bleeding risks  -Urgent referral for OBGYN referral in place for further testing needs---Scheduled for 25  -CMP, CBC with diff and CRP due weekly on  as directed with no end date at this time. Fax results to [phone] AUSTYN Sindhu [place]     Physical deconditioning  Generalized muscle weakness  Comment: Acute on chronic. Known poor history PTA. [facility] given concerns. Per therapy-Ambulates up to 200ft with upwalker. SBA for most needs and LB cares. /30 on the SLUMS, just below normal indicating MNCD  Plan:  -Continue Physical therapy and Occupational therapy  -Recommend cognitive testing on site  -Highly recommend FDC compliance post TCU     Electronically signed by:    Manda Flor DNP, APRN, FNP-C, WCS-C, EDS-C     Provider reviewed records from facility, and interpreted most recent imaging/lab work, and vital signs.   Acute and chronic conditions managed by writer. Have been reviewed during today's exam

## 2025-05-20 ENCOUNTER — LAB REQUISITION (OUTPATIENT)
Dept: LAB | Facility: CLINIC | Age: 75
End: 2025-05-20
Payer: MEDICARE

## 2025-05-20 ENCOUNTER — TRANSITIONAL CARE UNIT VISIT (OUTPATIENT)
Dept: GERIATRICS | Facility: CLINIC | Age: 75
End: 2025-05-20
Payer: MEDICARE

## 2025-05-20 DIAGNOSIS — C73 PAPILLARY THYROID CARCINOMA (H): ICD-10-CM

## 2025-05-20 DIAGNOSIS — M81.0 AGE-RELATED OSTEOPOROSIS WITHOUT CURRENT PATHOLOGICAL FRACTURE: ICD-10-CM

## 2025-05-20 DIAGNOSIS — N18.32 STAGE 3B CHRONIC KIDNEY DISEASE (H): ICD-10-CM

## 2025-05-20 DIAGNOSIS — B99.9 INTRA-ABDOMINAL INFECTION: Primary | ICD-10-CM

## 2025-05-20 DIAGNOSIS — C44.329 SQUAMOUS CELL CANCER OF SKIN OF LEFT CHEEK: ICD-10-CM

## 2025-05-20 DIAGNOSIS — M62.81 GENERALIZED MUSCLE WEAKNESS: ICD-10-CM

## 2025-05-20 DIAGNOSIS — I10 BENIGN ESSENTIAL HYPERTENSION: ICD-10-CM

## 2025-05-20 DIAGNOSIS — Z94.4 STATUS POST LIVER TRANSPLANTATION (H): ICD-10-CM

## 2025-05-20 DIAGNOSIS — K59.01 SLOW TRANSIT CONSTIPATION: ICD-10-CM

## 2025-05-20 DIAGNOSIS — I48.0 PAF (PAROXYSMAL ATRIAL FIBRILLATION) (H): ICD-10-CM

## 2025-05-20 DIAGNOSIS — G47.34 NOCTURNAL HYPOXEMIA: ICD-10-CM

## 2025-05-20 DIAGNOSIS — R42 DIZZINESS: ICD-10-CM

## 2025-05-20 DIAGNOSIS — I50.32 CHRONIC DIASTOLIC HEART FAILURE (H): ICD-10-CM

## 2025-05-20 DIAGNOSIS — G89.29 CHRONIC SHOULDER PAIN, UNSPECIFIED LATERALITY: ICD-10-CM

## 2025-05-20 DIAGNOSIS — R10.9 UNSPECIFIED ABDOMINAL PAIN: ICD-10-CM

## 2025-05-20 DIAGNOSIS — N18.32 TYPE 2 DIABETES MELLITUS WITH STAGE 3B CHRONIC KIDNEY DISEASE, WITHOUT LONG-TERM CURRENT USE OF INSULIN (H): ICD-10-CM

## 2025-05-20 DIAGNOSIS — D84.9 IMMUNOCOMPROMISED: ICD-10-CM

## 2025-05-20 DIAGNOSIS — L21.9 SEBORRHEIC DERMATITIS: ICD-10-CM

## 2025-05-20 DIAGNOSIS — R04.0 EPISTAXIS: ICD-10-CM

## 2025-05-20 DIAGNOSIS — R79.89 ABNORMAL LIVER FUNCTION TESTS: ICD-10-CM

## 2025-05-20 DIAGNOSIS — Z86.19 HX OF SEPSIS: ICD-10-CM

## 2025-05-20 DIAGNOSIS — K21.9 GASTROESOPHAGEAL REFLUX DISEASE WITHOUT ESOPHAGITIS: ICD-10-CM

## 2025-05-20 DIAGNOSIS — M25.519 CHRONIC SHOULDER PAIN, UNSPECIFIED LATERALITY: ICD-10-CM

## 2025-05-20 DIAGNOSIS — Z85.828 S/P MOHS SURGERY FOR BASAL CELL CARCINOMA: ICD-10-CM

## 2025-05-20 DIAGNOSIS — Z98.890 S/P MOHS SURGERY FOR BASAL CELL CARCINOMA: ICD-10-CM

## 2025-05-20 DIAGNOSIS — E11.22 TYPE 2 DIABETES MELLITUS WITH STAGE 3B CHRONIC KIDNEY DISEASE, WITHOUT LONG-TERM CURRENT USE OF INSULIN (H): ICD-10-CM

## 2025-05-20 DIAGNOSIS — R53.81 PHYSICAL DECONDITIONING: ICD-10-CM

## 2025-05-20 DIAGNOSIS — I10 HYPERTENSION GOAL BP (BLOOD PRESSURE) < 140/80: ICD-10-CM

## 2025-05-20 DIAGNOSIS — E03.9 HYPOTHYROIDISM, UNSPECIFIED TYPE: ICD-10-CM

## 2025-05-20 DIAGNOSIS — G47.33 OSA (OBSTRUCTIVE SLEEP APNEA): ICD-10-CM

## 2025-05-20 DIAGNOSIS — N93.9 VAGINAL BLEEDING: ICD-10-CM

## 2025-05-20 DIAGNOSIS — M54.50 BILATERAL LOW BACK PAIN WITHOUT SCIATICA, UNSPECIFIED CHRONICITY: ICD-10-CM

## 2025-05-20 DIAGNOSIS — F32.A DEPRESSION, UNSPECIFIED DEPRESSION TYPE: ICD-10-CM

## 2025-05-20 DIAGNOSIS — D64.9 CHRONIC ANEMIA: ICD-10-CM

## 2025-05-20 DIAGNOSIS — E78.5 HYPERLIPIDEMIA LDL GOAL <70: ICD-10-CM

## 2025-05-20 DIAGNOSIS — Z87.898 HX OF BACTEREMIA: ICD-10-CM

## 2025-05-20 DIAGNOSIS — R11.0 NAUSEA: ICD-10-CM

## 2025-05-20 PROCEDURE — 99309 SBSQ NF CARE MODERATE MDM 30: CPT | Performed by: NURSE PRACTITIONER

## 2025-05-20 PROCEDURE — 81001 URINALYSIS AUTO W/SCOPE: CPT | Performed by: NURSE PRACTITIONER

## 2025-05-20 RX ORDER — CALCIUM CARBONATE 500 MG/1
2 TABLET, CHEWABLE ORAL EVERY 4 HOURS PRN
Status: SHIPPED
Start: 2025-05-20

## 2025-05-20 NOTE — LETTER
5/20/2025      Luz Thompson  [address]  [facility] [address]        [facility] GERIATRICS    Chief Complaint   Patient presents with     RECHECK     HPI:  Luz Thompson is a 75 year old  (1950), who is being seen today for an episodic care visit at: [facility] ([facility])([facility]) [[id number]]. Today's concern is: The primary encounter diagnosis was Intra-abdominal infection. Diagnoses of Slow transit constipation, Physical deconditioning, Generalized muscle weakness, Age-related osteoporosis without current pathological fracture, Chronic shoulder pain, unspecified laterality, Bilateral low back pain without sciatica, unspecified chronicity, Chronic anemia, 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, Hypothyroidism, unspecified type, S/P Mohs surgery for basal cell carcinoma, Hyperlipidemia LDL goal <70, Chronic diastolic heart failure (H), Status post liver transplantation (H), Stage 3b chronic kidney disease (H), RASHIDA (obstructive sleep apnea), Type 2 diabetes mellitus with stage 3b chronic kidney disease, without long-term current use of insulin (H), Depression, unspecified depression type, PAF (paroxysmal atrial fibrillation) (H), Hypertension goal BP (blood pressure) < 140/80, Abnormal liver function tests, Nocturnal hypoxemia, Immunocompromised, Hx of sepsis, Hx of bacteremia, Seborrheic dermatitis, Papillary thyroid carcinoma (H), Epistaxis, Vaginal bleeding, Benign essential hypertension, Dizziness, Gastroesophageal reflux disease without esophagitis, and Nausea were also pertinent to this visit.    Noted per staff this early AM of fall. Met with patient who was found lying in bed. She reports during her fall she was in the bathroom. She was attempting to use wipes to cleanse herself and the wipes fell to the floor, while attempting to pick them up off the floor, she fell onto buttocks. Denies hitting her head. Minor skin tear to right forearm S/T name  "band causing trauma to skin during fall. She denies any chest pain, palpitations, shortness of breath, [place], lightheadedness, dizziness, or cough. She occasionally reports abdominal discomfort with nausea complaints.  Requested for TUMS this past weekend with good relief. She reports incontinence or urine. Episode of vaginal bleeding was noted this early AM. Denies any further bleeding at this time. Denies dysuria or frequency, but noted cloudy urine appearance. She would like urine testing today, although previous 2 times were negative. She is tolerating antibiotic course well without allergies. No rashes or itch complaints. She denies loose or constipation. Appetite fair. Sleeping well.     BP Readings from Last 3 Encounters:   05/19/25 122/72   05/15/25 131/55   05/13/25 127/59     Wt Readings from Last 5 Encounters:   05/19/25 83.3 kg (183 lb 9.6 oz)   05/15/25 84 kg (185 lb 3.2 oz)   05/13/25 84 kg (185 lb 3.2 oz)   05/12/25 84 kg (185 lb 3.2 oz)   05/07/25 82.1 kg (181 lb)     Allergies, and PMH/PSH reviewed in EPIC today.  REVIEW OF SYSTEMS:  4 point ROS including Respiratory, CV, GI and , other than that noted in the HPI,  is negative    Objective:   /72   Pulse 59   Temp 97.7  F (36.5  C)   Resp 18   Ht 1.702 m (5' 7\")   Wt 83.3 kg (183 lb 9.6 oz)   LMP 06/01/1988 (Approximate)   SpO2 97%   BMI 28.76 kg/m    GENERAL APPEARANCE:  Alert, in no distress, cooperative  ENT:  Mouth and posterior oropharynx normal, moist mucous membranes, [place]  EYES:  EOM, conjunctivae, lids, pupils and irises normal  NECK:  No adenopathy,masses or thyromegaly  RESP:  respiratory effort and palpation of chest normal, lungs clear to auscultation , no respiratory distress  CV:  regular rate and rhythm, no murmur, rub, or gallop, peripheral edema 1+ in BLE  ABDOMEN:  normal bowel sounds, soft, nontender, no hepatosplenomegaly or other masses, no guarding or rebound  M/S:   Ambulates with upwalker. Staff to assist for " ADLs, transfers and cares  SKIN:  Inspection of skin and subcutaneous tissue baseline, small skin tear noted to right wrist S/T recent fall  NEURO:   Cranial nerves 2-12 are normal tested and grossly at patient's baseline, no purposeful movement in upper and lower extremities  PSYCH:  oriented X 3, memory impaired , affect and mood normal    Most Recent 3 CBC's:  Recent Labs   Lab Test 05/19/25  1037 05/12/25  1140 05/05/25  1102   WBC 9.9 12.7* 15.6*   HGB 9.2* 10.0* 10.1*   MCV 90 89 90    413 400     Most Recent 3 BMP's:  Recent Labs   Lab Test 05/19/25  1037 05/12/25  1140 05/05/25  1102    137 140   POTASSIUM 5.2 5.0 4.7   CHLORIDE 107 102 103   CO2 19* 19* 22   BUN 90.6* 79.3* 44.3*   CR 2.14* 1.79* 1.54*   ANIONGAP 12 16* 15   KEILY 8.7* 9.0 9.3   GLC 88 74 93     Most Recent 2 LFT's:  Recent Labs   Lab Test 05/19/25  1037 05/12/25  1140   AST 14 29   ALT 22 47   ALKPHOS 118 124   BILITOTAL 0.3 0.3     Most Recent ESR & CRP:  Recent Labs   Lab Test 05/19/25  1037 04/28/25  1213 04/15/25  0613 06/13/23  1820 08/26/19  2331   SED  --   --  61*  --  78*   CRP  --   --   --   --  180.0*   CRPI <3.00   < >  --    < >  --     < > = values in this interval not displayed.     Most Recent Anemia Panel:  Recent Labs   Lab Test 05/19/25  1037 04/14/25  1410 04/07/25  0945 09/04/24  1008 06/15/23  1028   WBC 9.9   < > 6.5   < > 7.6   HGB 9.2*   < > 9.1*   < > 12.1   HCT 30.0*   < > 30.0*   < > 38.4   MCV 90   < > 88   < > 92      < > 351   < > 345   IRON  --   --  36*   < >  --    IRONSAT  --   --  13*   < >  --    FEB  --   --  277   < >  --    LAURA  --   --  44   < >  --    B12  --   --   --   --  456    < > = values in this interval not displayed.       ASSESSMENT/PLAN:  sepsis 2/2 intraabdominal infection - resolved  Recurrent UTIs on IV ertapenem since 3/9/2024  Recent Hx of E. Faecalis bacteremia  Sigmoid microperforation  Leukocytosis  Comment: Admitted for generalized weakness likely 2/2  infection. Found to have intraabdominal infection secondary to a sigmoid microperforation. Recent admission in March 2025 for Klebsiella pneumonia UTI and E. Faecalis bacteremia with recommendations from ID for ertapenem 1g q24h until EOT 4/22. RVP negative.Abdominal CT scan scheduled as directed for 5/5/25 at 1220pm--results as reviewed above--Urine screen assessed twice with negative bacteria. Today 5/16/25: She does report one episode of incontinence which she reports is a tell tale sign of her having a UTI. Remains Afebrile and current on IV antibiotic course as directed per ID. Tolerating IV ABx course without concerns  Plan:  -Follow up with ID as directed. Scheduled for 5/28/25  -Follow up with colorectal surgery as directed. Scheduled for 5/27/25 to further discuss sigmoidectomy needs  -Follow up with urology as directed. Scheduled for 6/10/25  -Repeat abdominal CT scan scheduled for 5/22/25  -Continue vaginal estradiol MWF at HS  -Collect UA/UC per request. Low suspicions today   -Monitor urinary status  -Benadryl PRN--Administer Benadryl prn 30 min. before administration of IV antibiotic Tigecycline.  -Monitor for worsening s/symptoms of concerns  -Per ID on 5/8/25 IV abc course from switched from ertapenem to now Tigecycline 50mg IV BID until 5/2825  -CMP, CBC with diff and CRP due weekly on Mondays as directed until 6/2/25. Fax results to [phone] AUSTYN [place]     Liver transplant in 2002 2/2 primary biliary cirrhosis  Comment: Chronic. Follows Dr. Ramirez.   Plan:  -Continue PTA sirolimus 1 mg every day  -Continue prednisone 9mg daily for now. (5mg with 4 tablets of 1mg=9mg total)  -Continue PTA ursodiol 250 mg BID  -Follow up with transplant team as directed. Scheduled for 5/28/25  -CMP, CBC with diff and CRP due weekly on Mondays as directed with no end date at this time. Fax results to [phone] ATTDENNIS [place]     Depression   Comment: Chronic. Stable today  Plan:  -Monitor mood  and behaviors  -Monitor for worsening s/symptoms of concerns  -Monitor for changes in mobility, eating and sleeping patterns  -Continue zoloft 50mg daily.   -Follow up with psych post TCU  -CMP, CBC with diff and CRP due weekly on Mondays as directed with no end date at this time. Fax results to [phone] AUSTYN Sindhu Quang     Mild Obstructive Sleep Apnea   Nocturnal Hypoxemia   Comment: Chronic. Sleep study in 2018 showing mild RASHIDA with recommendation to start CPAP. IT does not appear that there was further follow up and not using CPAP.  Plan:  -Monitor sleeping patterns  -Monitor respiratory status  -PCP to consider sleep medicine referral  -CMP, CBC with diff and CRP due weekly on Mondays as directed with no end date at this time. Fax results to [phone] AUSTYN [place]     CKD3b w/ moderate proteinuria, at baseline  HTN  CVD/HLD  paroxysmal atrial fibrillation  Comment: Chronic. Baseline Cr. 1.5. History of dialysis at time of liver transplant 23 years ago. Recurrent AKIs and immunosuppressive medication toxicity. Not on SGLT2i 2/2 recurrent UTI. Chart review w/ unclear heart failure history, unconfirmed with EF 60-65% on 3/10/25. BNP 1446 elevated from 1 month ago. Given unclear HF history, trace bilateral LE edema, with some pulm edema noted on imaging, consideration for volume overload. However, without orthopnea or clear cough.   Plan:  -Continue amlodipine 5mg daily. HOLD if SBP<100  -Continue Meclizine 25mg TID PRN  -Continue PTA hydralazine 50mg TID. Hold for SBP <100mmHg.   -Continue PTA metoprolol succinate 25mg every day. HOLD if SBP<100 and/or HR<55  -Monitor BP and HR  -HOLD PTA evolocumab q2week. Follow up with PCP post TCU to resume. Resume off while on ANTIBIOTIC course  -Continue PTA eztimibe 10mg every day  -Continue reduced apixaban 2.5mg BID ongoing for now given vaginal bleeding risks  -CMP, CBC with diff and CRP due weekly on Mondays as directed with no end date at this  time. Fax results to [phone] AUSTYN [place]     T2DM  (A1c 6.8% 4/14/25)  Comment: Chronic. Last A1c 6.8% on 4/14/25.   Plan:  -Continue PTA linagliptin 5mg every day  -Continue PTA gabapentin 300mg at bedtime  -Blood Glucose monitoring back to previous schedule of BID. HOLD off on using freestyle phil 2 sensor while on TCU  -CMP, CBC with diff and CRP due weekly on Mondays as directed with no end date at this time. Fax results to [phone] AUSTYN [place]     L cheek SCC s/p MOHS 4/8/25  Skin lesion  Comment: Tumor debulk with perineural invasion w/ pending Tumor Board for consideration of radiation therapy. Wound does not look infected. Today 5/7:She does report having skin lesion to left anterior forearm. 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 Will defer to dermatology  Plan:  -Monitor for worsening s/symptoms of concerns  -Follow up with dermatology regarding mass noted to left anterior forearm. Dermatology requested for PET scan which is scheduled for 5/29/25     anemia, chronic  Comment: Chronic. Hgb 9.9 on admit. Stable.  Plan:  -Monitor bleeding risks  -Continue PTA ferrous sulfate daily  -Continue folic acid daily  -CMP, CBC with diff and CRP due weekly on Mondays as directed with no end date at this time. Fax results to [phone] AUSTYN [place]     Hypothyroidism  Papillary thyroid carcinoma  Chronic. Recent TSH~1.72 on 4/4/25  Plan;  -Continue PTA levothyroxine 175mcg qD   -Monitor for worsening s/symptoms of concerns  -CMP, CBC with diff and CRP due weekly on Mondays as directed with no end date at this time. Fax results to [phone] AUSTYN [place]     Chronic Shoulder Pain  Osteoarthritis   Lower back pain  Comment: Chronic. stable  Plan:  -Monitor pain complaints  -Continue diclofenac gel application to painful areas QID  -Continue Tylenol 1000mg BID   -Continue gabapentin 300mg at HS--only able to tolerate liquid given  gelatin capsule allergy  -CMP, CBC with diff and CRP due weekly on  as directed with no end date at this time. Fax results to [phone] ATTN Sindhu [place]     Constipation   Nausea  GERD  Comment: Chronic. S/T polypharmacy. She occasionally reports abdominal discomfort with nausea complaints.  Requested for TUMS this past weekend with good relief. She denies loose or constipation. Appetite fair.   Plan:  -Monitor BM patterns  -change TUMS to 1000mg every 4 hours PRN  -Continue beneFiber qd    -Zofran PRN     Epistaxis  Comment: Acute on chronic. She reports occasional nose bleed at times. No episodes while on TCU.   Plan:  -Monitor bleeding risks  -Monitor for worsening s/symptoms of concerns  -Afrin BID PRN on TCU if warranted     Vaginal bleeding  Comment: Acute on chronic. Now having 4-5 episodes of painless vaginal bleeding noted since TCU admission. Last noted this AM 25. Urine screens x 2 negative. Last menstrual period she reports has been 40 years ago or so. Known family history of endometrial cancer that metastasized to bowel per her reports. She reports her mother had extensive genetic testing for review in EPIC. Reports mother name: Anne Yap  3/9/23 for reference if indicated.   Plan:  -Continue reduced apixaban dose of 2.5mg BID for now given vaginal bleeding risks  -Urgent referral for OBGYN referral in place for further testing needs---Scheduled for 25  -CMP, CBC with diff and CRP due weekly on  as directed with no end date at this time. Fax results to [phone] AUSTYN [place]     Physical deconditioning  Generalized muscle weakness  Comment: Acute on chronic. Known poor history PTA. [facility] given concerns. Per therapy-Ambulates up to 200ft with upwalker. SBA for most needs and LB cares. /30 on the SLUMS, just below normal indicating MNCD  Plan:  -Continue Physical therapy and Occupational therapy  -Recommend  cognitive testing on site  -Highly recommend retirement compliance post TCU     Electronically signed by:  Dr. Manda Flor DNP, APRN, FNP-C, WCS-C, EDS-C     Provider reviewed records from facility, and interpreted most recent imaging/lab work, and vital signs.   Acute and chronic conditions managed by writer. Have been reviewed during today's exam      Sincerely,        Manda Flor, LIZ CNP    Electronically signed

## 2025-05-20 NOTE — PROGRESS NOTES
Colon and Rectal Surgery Clinic Note    RE: Luz Thompson.  : 1950.  MINNIE: 2025.    Reason for visit: diverticulitis.    HPI: Luz Thompson is a 75 year old female who presents today for diverticulitis. She has a past medical history of anemia, anxiety, CKD stage 3, CVA, DVT on Eliquis, EBV, HLD, HTN, osteoarthritis, a fib, biliary cirrhosis s/p liver transplant in , Sjogren's syndrome, basal cell carcinoma s/p MOHS on 2025, thyroid cancer s/p thyroidectomy in , and type 2 diabetes (last A1C in April was 6.8). On 2025-2025 she was admitted for recurrent ESBL UTI and E. Faecalis bacteremia related to sigmoid microperforation due to diverticulitis. ID recommended IV ertapenem. She has no documented diverticulitis flares previous to this.     Last colonoscopy on 2017 which demonstrated multiple small-mouthed diverticula in the sigmoid colon.     Today [place] feels OK. She reports some abdominal pain and intolerance to abx.     Colonoscopy (2017):  Findings:       Multiple small-mouthed diverticula were found in the sigmoid colon.                                                                                    Impression:          - Preparation of the colon was fair.                        - Diverticulosis in the sigmoid colon.                        - No specimens collected.                        - Rectal perforation upon retroflexion treated                        successfully with 3 clips.         CT Abdomen/Pelvis (12/10/2024):                                                    IMPRESSION:   1.  Left colonic diverticula. Small amount of fluid adjacent to the sigmoid colon possibly secondary to acute diverticulitis. No additional inflammatory changes or evidence for obstruction.  2.  Constipation.  3.  Liver transplant.   4.  Calcified pleural plaques right lung likely related to prior asbestos exposure. Small groundglass opacities right lower lobe, of  indeterminate time frame, possibly chronic.  5.  Tiny hyperdense lesions left kidney too small to further characterize, but could represent proteinaceous or hemorrhagic cysts.    CT Abdomen/Pelvis (4/14/2025):  IMPRESSION:  1.  New fat stranding and mesenteric edema about the distal sigmoid  colon in the midline, nonspecific, but may represent diverticulitis.  Suspected contained microperforation with small fluid collection  containing tiny focus of air posterior to the sigmoid colon.  2.  Distended bladder within normal limits.  3.  Unchanged fluid density 1.2 cm lesion in the region of the  pancreatic head.  4.  Stable postsurgical changes of liver transplant and curvilinear  hypodensity in the inferior right lobe.  5.  Other chronic CT findings including small bilateral pleural  effusions and right greater than left basilar calcified opacities are  unchanged.    CT Abdomen/Pelvis (5/5/2025):  IMPRESSION:   1. Mild enlargement of fluid collection containing focus of air  posterior to the sigmoid colon measuring up to 4.1 cm in craniocaudal  dimension, which remains concerning for a contained perforation from  prior diverticulitis.  2. 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  3. Postsurgical changes of liver transplant with stable curvilinear  hypodensity in the inferior right lobe. Interval new mild pneumobilia.  4. Reduced trace bilateral pleural effusions with adjacent  atelectasis.   5. Stable ancillary findings as detailed in the body of the report.     CBC Trend:   Component      Latest Ref [facility] 4/28/2025  12:13 PM 5/5/2025  11:02 AM 5/12/2025  11:40 AM 5/19/2025  10:37 AM   WBC      4.0 - 11.0 10e3/uL 14.2 (H)  15.6 (H)  12.7 (H)  9.9    RBC Count      3.80 - 5.20 10e6/uL 3.70 (L)  3.66 (L)  3.62 (L)  3.33 (L)    Hemoglobin      11.7 - 15.7 g/dL 9.9 (L)  10.1 (L)  10.0 (L)  9.2 (L)    Hematocrit      35.0 - 47.0 % 32.7 (L)  32.9 (L)  32.1 (L)   30.0 (L)    MCV      78 - 100 fL 88  90  89  90    MCH      26.5 - 33.0 pg 26.8  27.6  27.6  27.6    MCHC      31.5 - 36.5 g/dL 30.3 (L)  30.7 (L)  31.2 (L)  30.7 (L)    RDW      10.0 - 15.0 % 18.3 (H)  18.8 (H)  18.4 (H)  18.5 (H)    Platelet Count      150 - 450 10e3/uL 497 (H)  400  413  357    % Neutrophils      % 74  75  60  75    % Lymphocytes      % 13  12  22  13    % Monocytes      % 9  10  14  10    % Eosinophils      % 1  1  1  1    % Basophils      % 1  0  1  0    % Immature Granulocytes      % 3  2  3  1    NRBC/W      <1 /100 0  0  0  0    Absolute Neutrophil      1.6 - 8.3 10e3/uL 10.4 (H)  11.7 (H)  7.6  7.4    Absolute Lymphocytes      0.8 - 5.3 10e3/uL 1.9  1.8  2.7  1.3    Absolute Monocytes      0.0 - 1.3 10e3/uL 1.3  1.6 (H)  1.7 (H)  1.0    Absolute Eosinophils      0.0 - 0.7 10e3/uL 0.1  0.1  0.2  0.1    Absolute Basophils      0.0 - 0.2 10e3/uL 0.1  0.1  0.1  0.0    Absolute Immature Granulocytes      <=0.4 10e3/uL 0.4  0.4  0.4  0.1    Absolute NRBCs      10e3/uL 0.0  0.0  0.0  0.0       CMP Trend:   Component      Latest Ref Rng 4/20/2025  6:37 AM 4/28/2025  12:13 PM 5/5/2025  11:02 AM 5/12/2025  11:40 AM 5/19/2025  10:37 AM   Sodium      135 - 145 mmol/L 143  139  140  137  138    Creatinine      0.51 - 0.95 mg/dL 1.33 (H)  1.45 (H)  1.54 (H)  1.79 (H)  2.14 (H)    Calcium      8.8 - 10.4 mg/dL 8.8  9.3  9.3  9.0  8.7 (L)    Protein Total      6.4 - 8.3 g/dL 5.8 (L)  6.5  6.7  6.5  5.4 (L)    Bilirubin Total      <=1.2 mg/dL <0.2  0.2  0.3  0.3  0.3    AST      0 - 45 U/L 27  19  20  29  14    ALT      0 - 50 U/L 50  11  14  47  22    GFR Estimate      >60 mL/min/1.73m2 41 (L)  37 (L)  35 (L)  29 (L)  23 (L)    Alkaline Phosphatase      40 - 150 U/L 97  91  101  124  118    Anion Gap      7 - 15 mmol/L 10  16 (H)  15  16 (H)  12    Potassium      3.4 - 5.3 mmol/L 4.2  4.5  4.7  5.0  5.2    Chloride      98 - 107 mmol/L 110 (H)  103  103  102  107    Carbon Dioxide (CO2)      22 - 29 mmol/L  23  20 (L)  22  19 (L)  19 (L)    Urea Nitrogen      8.0 - 23.0 mg/dL 35.6 (H)  39.9 (H)  44.3 (H)  79.3 (H)  90.6 (H)    Glucose      70 - 99 mg/dL 113 (H)  103 (H)  93  74  88    Albumin      3.5 - 5.2 g/dL 3.2 (L)  3.7  3.9  3.6  3.0 (L)       Medical history:  Anemia   Anxiety   CKD stage 3   CVA   DVT  EBV  HLD  HTN  Osteoarthritis   A fib  Biliary cirrhosis   Sjogren's syndrome   Thyroid cancer   Type 2 diabetes   Basal cell carcinoma     Surgical history:  Appendectomy   Cholecystectomy   Liver Transplant   Thyroidectomy   MOHS    Family history:  Family History   Problem Relation Age of Onset    Hypertension Mother     Endometrial Cancer Mother     Hyperlipidemia Mother     Prostate Cancer Father     Macular Degeneration Father     Cancer - colorectal Maternal Grandmother         in her 80's, has surgery and removal of part of kidney,  at age 98    Heart Disease Maternal Grandfather          at 98    Glaucoma Maternal Grandfather     Cerebrovascular Disease Paternal Grandmother         in her 80's    Hypertension Paternal Grandmother     Heart Disease Paternal Grandfather         MI    Alzheimer Disease Paternal Grandfather     Allergies Son     Neurologic Disorder Daughter         Migraines    Breast Cancer Other     Anesthesia Reaction No family hx of     Crohn's Disease No family hx of     Ulcerative Colitis No family hx of     Melanoma No family hx of     Skin Cancer No family hx of        Medications:  Current Outpatient Medications   Medication Sig Dispense Refill    acetaminophen (TYLENOL) 500 MG tablet Take 1,000 mg by mouth 2 times daily. During 25 med recc pt states take BID everyday      amLODIPine (NORVASC) 5 MG tablet Take 1 tablet (5 mg) by mouth daily. HOLD if SBP<100 30 tablet 2    apixaban ANTICOAGULANT (ELIQUIS) 2.5 MG tablet Take 1 tablet (2.5 mg) by mouth 2 times daily. 60 tablet 0    calcitRIOL (ROCALTROL) 0.25 MCG capsule Take 1 capsule (0.25 mcg) by mouth daily. 90 capsule  1    calcium carbonate (TUMS) 500 MG chewable tablet Take 2 tablets (1,000 mg) by mouth every 4 hours as needed for heartburn.      Continuous Glucose Sensor (FREESTYLE NINO 2 SENSOR) MISC Change every 14 days. 2 each 5    D-MANNOSE PO Take 1 Scoop by mouth 2 times daily.      diclofenac (VOLTAREN) 1 % topical gel Apply 4 g topically 4 times daily.      diphenhydrAMINE (BENADRYL) 25 MG tablet Take 25 mg by mouth every 6 hours as needed for itching or allergies.      EPINEPHrine (ANY BX GENERIC EQUIV) 0.3 MG/0.3ML injection 2-pack Inject 0.3 mLs (0.3 mg) into the muscle as needed for anaphylaxis. May repeat one time in 5-15 minutes if response to initial dose is inadequate. 2 each 1    estradiol (ESTRACE) 0.1 MG/GM vaginal cream Place vaginally three times a week.      evolocumab (REPATHA SURECLICK) 140 MG/ML prefilled autoinjector Inject 1 mL (140 mg) subcutaneously every 14 days. . Please have fasting labs drawn for further refills. 6 mL 3    ezetimibe (ZETIA) 10 MG tablet Take 1 tablet (10 mg) by mouth daily. 90 tablet 3    Ferrous Sulfate 324 MG TBEC Take 1 tablet by mouth daily.      folic acid (FOLVITE) 1 MG tablet TAKE 1 TABLET BY MOUTH DAILY 90 tablet 3    gabapentin (NEURONTIN) 250 MG/5ML solution Take 6 mLs (300 mg) by mouth at bedtime 180 mL 0    Glucagon (GVOKE HYPOPEN) 1 MG/0.2ML pen Inject the contents of 1 device under the skin into lower abdomen, outer thigh, or outer upper arm as needed for hypoglycemia. If no response after 15 minutes, additional 1 mg dose from a new device may be injected while waiting for emergency assistance.      glucose (BD GLUCOSE) 5 g chewable tablet Take 2 tablets (10 g) by mouth as needed (low blood sugar) 40 tablet 1    glucose 40 % (400 mg/mL) gel Take 15-30 g by mouth every 15 minutes as needed for low blood sugar.      hydrALAZINE (APRESOLINE) 50 MG tablet Take 1 tablet (50 mg) by mouth 3 times daily. hold if SBP <100mmHg.      ketoconazole (NIZORAL) 2 % external  shampoo Apply topically twice a week. Lather in the shower, wait 3-5 minutes before rinsing.      levothyroxine (SYNTHROID/LEVOTHROID) 175 MCG tablet Take 1 tablet (175 mcg) by mouth daily. 90 tablet 1    linagliptin (TRADJENTA) 5 MG TABS tablet Take 1 tablet (5 mg) by mouth daily. 90 tablet 1    magnesium citrate 1.745 GM/30ML solution Drink 1 bottle at 8pm the night before surgery 296 mL 0    metoprolol succinate ER (TOPROL XL) 25 MG 24 hr tablet Take 25 mg by mouth daily. HOLD if SBP<100 and/or HR<55      metroNIDAZOLE (FLAGYL) 500 MG tablet Take 1 tablet (500 mg) by mouth every 6 hours. At 8:00 am, 2:00 pm, 8:00 pm the day prior to your surgery with neomycin and zofran. 3 tablet 0    Multiple Vitamins-Minerals (PRESERVISION AREDS 2) CAPS Take 1 capsule by mouth 2 times daily      neomycin (MYCIFRADIN) 500 MG tablet Take 2 tablets (1,000 mg) by mouth every 6 hours. At 8:00 am, 2:00 pm, 8:00 pm the day prior to your surgery with flagyl and zofran. 6 tablet 0    NONFORMULARY Apply 1 Application topically daily as needed. Momma Bear Nerve Lotion - pt uses on feet      Nutrisource Fiber PO packet Take 1 packet by mouth daily. Benefiber      omega-3 acid ethyl esters (LOVAZA) 1 g capsule Take 1 capsule (1 g) by mouth 2 times daily.      ondansetron (ZOFRAN ODT) 4 MG ODT tab Take 1 tablet (4 mg) by mouth every 6 hours as needed.      ondansetron (ZOFRAN) 4 MG tablet Take 1 tablet (4 mg) by mouth every 6 hours. At 8:00 am, 2:00 pm, 8:00 pm the day prior to your surgery with neomycin and flagyl. 3 tablet 0    oxymetazoline (AFRIN) 0.05 % nasal spray Spray 0.2 mLs (2 sprays) into both nostrils 2 times daily as needed for congestion. 30 mL 0    polyethylene glycol (MIRALAX) 17 GM/Dose powder Please take 238 grams mixed with 64 oz of Gatorade at 4pm the night before surgery 238 g 0    predniSONE (DELTASONE) 1 MG tablet Take 4 tablets (4 mg) by mouth daily. Take 4mg (1mg tablets x4) and Take with 5mg tablet to equal 9mg  daily 90 tablet 0    predniSONE (DELTASONE) 5 MG tablet Take 1 tablet (5 mg) by mouth daily. Take with 5mg tablet with 4mg (1mg tablets x4) to equal 9mg daily 30 tablet 0    sertraline (ZOLOFT) 50 MG tablet Take 1 tablet (50 mg) by mouth daily. 30 tablet 0    sirolimus (GENERIC EQUIVALENT) 1 MG tablet Take 1 tablet (1 mg) by mouth daily. 90 tablet 3    tigecycline (TYGACIL) 50 MG injection Inject 50 mg into the vein every 12 hours for 15 days.      triamcinolone (KENALOG) 0.1 % external ointment Apply topically 2 times daily. Use 2 weeks or less. 80 g 0    ursodiol (ACTIGALL) 250 MG tablet Take 1 tablet (250 mg) by mouth 2 times daily. 180 tablet 3       Allergies:  Allergies   Allergen Reactions    Fluconazole Hives and Itching     Full body hives  **Intradermal skin testing negative**  [See intradermal skin testing results from 8/2/2019]    Mycophenolate Diarrhea and Nausea and Vomiting     Patient stated it was chronic and lasted months      Penicillins Anaphylaxis, Hives, Itching and Rash     **Intradermal skin testing negative**  [See intradermal skin testing results from 8/2/2019]      Simvastatin Muscle Pain (Myalgia)     severe  Other reaction(s): Myalgia caused by statin    Methotrexate Other (See Comments)     Other reaction(s): Sore  Sores in mouth, esophagus, and stomach.       Morphine And Codeine Itching and Other (See Comments)     Psych disturbance  Other reaction(s): Confusion, Mood alteration    Quinolones Anxiety, Dizziness, Headache, Other (See Comments), Palpitations and Unknown     Other reaction(s): Hyperactive behavior, Lightheadedness, Mood alteration    Dizzy, light headed    Dizziness, shaky, and jumpy    Capsules, Empty Gelatin [Gelatin]     Carvedilol Diarrhea     Per pt - severe diarrhea and LE swelling  + tolerating Toprol    Lansoprazole Diarrhea    Strawberry Extract     Azithromycin Itching     [See intradermal skin testing results from 8/2/2019]    Bactrim  "[Sulfamethoxazole-Trimethoprim] Other (See Comments)     Numb mouth, tingling lips (treated with anti-histamines)    Cephalosporins Itching     [See intradermal skin testing results from 2019]    Ciprofloxacin Hcl Other (See Comments) and Dizziness     Insomnia, mood lability, Irregular heart beat         Doxycycline Itching and Unknown     [See intradermal skin testing results from 2019]    Lisinopril Cough    Omeprazole Itching    Tolectin [Nsaids] Rash    Tolmetin Rash and Itching    Tramadol Rash, Hives and Itching       Social history:   Social History     Tobacco Use    Smoking status: Former     Current packs/day: 0.00     Average packs/day: 1 pack/day for 18.0 years (18.0 ttl pk-yrs)     Types: Cigarettes     Start date: 1967     Quit date: 1985     Years since quittin.1    Smokeless tobacco: Never   Substance Use Topics    Alcohol use: Yes     Alcohol/week: 0.0 standard drinks of alcohol     Comment: rare - \"I toast at weddings\"     Marital status: .    ROS:  A complete review of systems was performed with the patient and all systems negative except as per HPI.    Physical Examination:  BP 97/50 (BP Location: Left arm, Patient Position: Sitting, Cuff Size: Adult Regular)   Pulse 61   Resp 14   Ht 1.702 m (5' 7\")   Wt 81.6 kg (180 lb)   LMP 1988 (Approximate)   SpO2 99%   BMI 28.19 kg/m    General: Well hydrated. No acute distress.  CV: RRR  Lung: Non-labored breathing on RA  Abdomen: Soft, NT. Well healed incisions.      ASSESSMENT    This is a 75 year old F with a complex medical and surgical history, now with perforated diverticulitis on chronic abx. Giver her immunosuppression, an open sigmoidectomy is indicated. She is high risk for postop morbidity. The safest option would be to proceed with a lala's procedure. However, she would prefer to restore intestinal continuity, which is not unreasonable. We had a long discussion. I will perform a sigmoidectomy " with DLI. They asked appropriate questions, which were answered. Risks, benefits, and alternatives of operative treatment were thoroughly discussed with the patient, he/she understands these well and agrees to proceed.    All pertinent labs and imaging were personally reviewed by me.      PLAN  - To OR for open sigmoidectomy and DLI  - Mech/abx bowel prep  - Preop teaching and eval  - WOCN to [facility]  - Urology to place stents  - Colonoscopy after surgery  - Referral to txp hepatology for preop optimization and follow up of IPMN. Would need to come off sirolimus.  - Healthy life style and weight loss were encouraged      40 minutes spent on the date of the encounter doing chart review, history and exam, imaging review, documentation and further activities as noted above.      Al Campbell MD, FACS, FASCRS, FSSO    Division of Colon and Rectal Surgery  [facility]    Referring Provider:  No referring provider defined for this encounter.     Primary Care Provider:  Natividad Lundberg

## 2025-05-20 NOTE — PROGRESS NOTES
[facility]    Chief Complaint   Patient presents with    RECHECK     HPI:  Luz Thompson is a 75 year old  (1950), who is being seen today for an episodic care visit at: [facility] ([facility])([facility]) [[id number]]. Today's concern is: The primary encounter diagnosis was Intra-abdominal infection. Diagnoses of Slow transit constipation, Physical deconditioning, Generalized muscle weakness, Chronic shoulder pain, unspecified laterality, Age-related osteoporosis without current pathological fracture, Bilateral low back pain without sciatica, unspecified chronicity, Chronic anemia, 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, Hypothyroidism, unspecified type, S/P Mohs surgery for basal cell carcinoma, Nocturnal hypoxemia, Hyperlipidemia LDL goal <70, Stage 3b chronic kidney disease (H), Chronic diastolic heart failure (H), Status post liver transplantation (H), RASHIDA (obstructive sleep apnea), Type 2 diabetes mellitus with stage 3b chronic kidney disease, without long-term current use of insulin (H), PAF (paroxysmal atrial fibrillation) (H), Depression, unspecified depression type, Hypertension goal BP (blood pressure) < 140/80, Abnormal liver function tests, Immunocompromised, Hx of sepsis, Hx of bacteremia, Seborrheic dermatitis, Papillary thyroid carcinoma (H), Epistaxis, Vaginal bleeding, Benign essential hypertension, Dizziness, and Personal history of fall were also pertinent to this visit.    Met with patient who was finishing her breakfast waiting for her son to arrive to assist with transportation to her CT scan today. She denies any chest pain, palpitations, shortness of breath, [place], lightheadedness, dizziness, or cough. She occasionally reports abdominal discomfort with nausea complaints. Stable reports today. Tolerating IV antibiotic course at this time without any rashes noted. Denies B&B concerns. Denies dysuria or frequency. Pending UC results but low suspicions  "given IV ANTIBIOTIC course. She reports ongoing worsening vaginal bleeding for several days. Eliquis was placed on hold again due to these symptoms last night. Reports no vaginal bleeding noted yet this morning. Still denies no pain with this vaginal bleeding when it occurs. No lower abdomen cramping or complaints. Denies loose or constipation. Appetite good. Sleeping well.     BP Readings from Last 3 Encounters:   05/22/25 116/64   05/19/25 122/72   05/15/25 131/55     Wt Readings from Last 5 Encounters:   05/22/25 82.7 kg (182 lb 6.4 oz)   05/19/25 83.3 kg (183 lb 9.6 oz)   05/15/25 84 kg (185 lb 3.2 oz)   05/13/25 84 kg (185 lb 3.2 oz)   05/12/25 84 kg (185 lb 3.2 oz)     Allergies, and PMH/PSH reviewed in EPIC today.  REVIEW OF SYSTEMS:  4 point ROS including Respiratory, CV, GI and , other than that noted in the HPI,  is negative    Objective:   /64   Pulse 63   Temp 98.3  F (36.8  C)   Resp 18   Ht 1.702 m (5' 7\")   Wt 82.7 kg (182 lb 6.4 oz)   LMP 06/01/1988 (Approximate)   SpO2 98%   BMI 28.57 kg/m    GENERAL APPEARANCE:  Alert, in no distress, cooperative  ENT:  Mouth and posterior oropharynx normal, moist mucous membranes, [place]  EYES:  EOM, conjunctivae, lids, pupils and irises normal  NECK:  No adenopathy,masses or thyromegaly  RESP:  respiratory effort and palpation of chest normal, lungs clear to auscultation , no respiratory distress  CV:  regular rate and rhythm, no murmur, rub, or gallop, no edema, +2 pedal pulses  ABDOMEN:  normal bowel sounds, soft, nontender, no hepatosplenomegaly or other masses, no guarding or rebound  M/S:   Ambulates with upwalker. High fall risk. Staff to assist where needed  SKIN:  Inspection of skin and subcutaneous tissue baseline, Palpation of skin and subcutaneous tissue baseline  NEURO:   Cranial nerves 2-12 are normal tested and grossly at patient's baseline, no purposeful movement in upper and lower extremities  PSYCH:  oriented X 3, memory impaired , " affect and mood normal    Most Recent 3 CBC's:  Recent Labs   Lab Test 05/19/25  1037 05/12/25  1140 05/05/25  1102   WBC 9.9 12.7* 15.6*   HGB 9.2* 10.0* 10.1*   MCV 90 89 90    413 400     Most Recent 3 BMP's:  Recent Labs   Lab Test 05/19/25  1037 05/12/25  1140 05/05/25  1102    137 140   POTASSIUM 5.2 5.0 4.7   CHLORIDE 107 102 103   CO2 19* 19* 22   BUN 90.6* 79.3* 44.3*   CR 2.14* 1.79* 1.54*   ANIONGAP 12 16* 15   KEILY 8.7* 9.0 9.3   GLC 88 74 93     Most Recent 2 LFT's:  Recent Labs   Lab Test 05/19/25  1037 05/12/25  1140   AST 14 29   ALT 22 47   ALKPHOS 118 124   BILITOTAL 0.3 0.3     7-Day Micro Results       Collected Updated Procedure Result Status      05/20/2025 2200 05/22/2025 0954 Urine Culture [[id number]]   (Abnormal)   Urine, Clean Catch    Preliminary result Component Value   Culture Culture in progress  [P]     >100,000 CFU/mL Gram negative bacilli  [P]                      Most Recent Urinalysis:  Recent Labs   Lab Test 05/20/25 2200 04/07/25  0945 04/07/25  0935   COLOR Light Yellow   < > Light Yellow   APPEARANCE Slightly Cloudy*   < > Slightly Cloudy*   URINEGLC Negative   < > Negative   URINEBILI Negative   < > Negative   URINEKETONE Negative   < > Negative   SG 1.013   < > 1.007   UBLD Large*   < > Trace*   URINEPH 5.0   < > 5.5   PROTEIN 10*   < > 10*   UROBILINOGEN  --   --  0.2   NITRITE Negative   < > Negative   LEUKEST Moderate*   < > Large*   RBCU 11*   < > 2-5*   WBCU 71*   < > >100*    < > = values in this interval not displayed.     Most Recent ESR & CRP:  Recent Labs   Lab Test 05/19/25  1037 04/28/25  1213 04/15/25  0613 06/13/23  1820 08/26/19  2331   SED  --   --  61*  --  78*   CRP  --   --   --   --  180.0*   CRPI <3.00   < >  --    < >  --     < > = values in this interval not displayed.     Most Recent Anemia Panel:  Recent Labs   Lab Test 05/19/25  1037 04/14/25  1410 04/07/25  0945 09/04/24  1008 06/15/23  1028   WBC 9.9   < > 6.5   < > 7.6   HGB 9.2*    < > 9.1*   < > 12.1   HCT 30.0*   < > 30.0*   < > 38.4   MCV 90   < > 88   < > 92      < > 351   < > 345   IRON  --   --  36*   < >  --    IRONSAT  --   --  13*   < >  --    FEB  --   --  277   < >  --    LAURA  --   --  44   < >  --    B12  --   --   --   --  456    < > = values in this interval not displayed.       ASSESSMENT/PLAN:  sepsis 2/2 intraabdominal infection - resolved  Recurrent UTIs on IV ertapenem since 3/9/2024  Recent Hx of E. Faecalis bacteremia  Sigmoid microperforation  Leukocytosis  Comment: Admitted for generalized weakness likely 2/2 infection. Found to have intraabdominal infection secondary to a sigmoid microperforation. Recent admission in March 2025 for Klebsiella pneumonia UTI and E. Faecalis bacteremia with recommendations from ID for ertapenem 1g q24h until EOT 4/22. RVP negative.Abdominal CT scan scheduled as directed for 5/5/25 at 1220pm--results as reviewed above--Urine screen assessed twice with negative bacteria. Today 5/16/25: She does report one episode of incontinence which she reports is a tell tale sign of her having a UTI. Remains Afebrile and current on IV antibiotic course as directed per ID. Tolerating IV ABx course without concerns  Plan:  -Follow up with ID as directed. Scheduled for 5/28/25  -Follow up with colorectal surgery as directed. Scheduled for 5/27/25 to further discuss sigmoidectomy needs  -Follow up with urology as directed. Scheduled for 6/10/25  -Repeat abdominal CT scan scheduled for 5/22/25 today  -Continue vaginal estradiol MWF at HS  -Pending UC results at this time. Low suspicions today   -Monitor urinary status  -Benadryl PRN--Administer Benadryl prn 30 min. before administration of IV antibiotic Tigecycline.  -Monitor for worsening s/symptoms of concerns  -Per ID on 5/8/25 IV abc course from switched from ertapenem to now Tigecycline 50mg IV BID until 5/2825  -CMP, CBC with diff and CRP due weekly on Mondays as directed until 6/2/25. Fax results  to [phone] AUSTYN Del Rio     Liver transplant in 2002 2/2 primary biliary cirrhosis  Comment: Chronic. Follows Dr. Ramirez.   Plan:  -Continue PTA sirolimus 1 mg every day  -Continue prednisone 9mg daily for now. (5mg with 4 tablets of 1mg=9mg total)  -Continue PTA ursodiol 250 mg BID  -Follow up with transplant team as directed. Scheduled for 5/28/25  -CMP, CBC with diff and CRP due weekly on Mondays as directed with no end date at this time. Fax results to [phone] AUSTYN Del Rio     Depression   Comment: Chronic. Stable today  Plan:  -Monitor mood and behaviors  -Monitor for worsening s/symptoms of concerns  -Monitor for changes in mobility, eating and sleeping patterns  -Continue zoloft 50mg daily.   -Follow up with psych post TCU  -CMP, CBC with diff and CRP due weekly on Mondays as directed with no end date at this time. Fax results to [phone] AUSTYN Del Rio     Mild Obstructive Sleep Apnea   Nocturnal Hypoxemia   Comment: Chronic. Sleep study in 2018 showing mild RASHIDA with recommendation to start CPAP. It does not appear that there was further follow up and not using CPAP.  Plan:  -Monitor sleeping patterns  -Monitor respiratory status  -PCP to consider sleep medicine referral  -CMP, CBC with diff and CRP due weekly on Mondays as directed with no end date at this time. Fax results to [phone] AUSTYN Del Rio     CKD3b w/ moderate proteinuria, at baseline  HTN  CVD/HLD  paroxysmal atrial fibrillation  Comment: Chronic. Baseline Cr. 1.5. History of dialysis at time of liver transplant 23 years ago. Recurrent AKIs and immunosuppressive medication toxicity. Not on SGLT2i 2/2 recurrent UTI. Chart review w/ unclear heart failure history, unconfirmed with EF 60-65% on 3/10/25. BNP 1446 elevated from 1 month ago. Given unclear HF history, trace bilateral LE edema, with some pulm edema noted on imaging, consideration for volume overload. However, without orthopnea or clear  cough.   Plan:  -Continue amlodipine 5mg daily. HOLD if SBP<100  -Discontinue meclizine given non use  -Continue PTA hydralazine 50mg TID. Hold for SBP <100mmHg.   -Continue PTA metoprolol succinate 25mg every day. HOLD if SBP<100 and/or HR<55  -Monitor BP and HR  -HOLD PTA evolocumab q2week. Follow up with PCP post TCU to resume. Resume off while on ANTIBIOTIC course  -Continue PTA eztimibe 10mg every day  -HOLD apixaban for now x 5 days given ongoing vaginal bleeding risks. Resume 2.5mg BID thereafter  -CMP, CBC with diff and CRP due weekly on Mondays as directed with no end date at this time. Fax results to [phone] AUSTYN Del Rio         T2DM  (A1c 6.8% 4/14/25)  Comment: Chronic. Last A1c 6.8% on 4/14/25.   Plan:  -Continue PTA linagliptin 5mg every day  -Continue PTA gabapentin 300mg at bedtime  -Blood Glucose monitoring back to previous schedule of BID. HOLD off on using freestyle phil 2 sensor while on TCU  -CMP, CBC with diff and CRP due weekly on Mondays as directed with no end date at this time. 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 Wound does not look infected. Today 5/7:She does report having skin lesion to left anterior forearm. 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 Will defer to dermatology  Plan:  -Monitor for worsening s/symptoms of concerns  -Follow up with dermatology regarding mass noted to left anterior forearm. Dermatology requested for PET scan which is scheduled for 5/29/25     anemia, chronic  Comment: Chronic. Hgb 9.9 on admit. Stable.  Plan:  -Monitor bleeding risks  -Continue PTA ferrous sulfate daily  -Continue folic acid daily  -CMP, CBC with diff and CRP due weekly on Mondays as directed with no end date at this time. Fax results to [phone] AUSTYN Manley  Quang     Hypothyroidism  Papillary thyroid carcinoma  Chronic. Recent TSH~1.72 on 25  Plan;  -Continue PTA levothyroxine 175mcg qD   -Monitor for worsening s/symptoms of concerns  -CMP, CBC with diff and CRP due weekly on  as directed with no end date at this time. Fax results to [phone] ATTDENNIS Del Rio     Chronic Shoulder Pain  Osteoarthritis   Lower back pain  Comment: Chronic. stable  Plan:  -Monitor pain complaints  -Continue diclofenac gel application to painful areas QID  -Continue Tylenol 1000mg BID   -Continue gabapentin 300mg at HS--only able to tolerate liquid given gelatin capsule allergy  -CMP, CBC with diff and CRP due weekly on  as directed with no end date at this time. Fax results to [phone] ATTDENNIS Del [place]     Constipation   Nausea  GERD  Comment: Chronic. S/T polypharmacy. She occasionally reports abdominal discomfort with nausea complaints. Stable reports today  Plan:  -Monitor BM patterns  -Continue TUMS 1000mg every 4 hours PRN  -Continue beneFiber qd    -Zofran PRN     Epistaxis  Comment: Acute on chronic. She reports occasional nose bleed at times. No episodes while on TCU.   Plan:  -Monitor bleeding risks  -Monitor for worsening s/symptoms of concerns  -Afrin BID PRN on TCU if warranted     Vaginal bleeding  Comment: Acute on chronic. Now having 4-5 episodes of painless vaginal bleeding noted since TCU admission. Last noted this AM 25. Urine screens x 2 negative. Last menstrual period she reports has been 40 years ago or so. Known family history of endometrial cancer that metastasized to bowel per her reports. She reports her mother had extensive genetic testing for review in EPIC. Reports mother name: Anne Yap  3/9/23 for reference if indicated. Today :She reports ongoing worsening vaginal bleeding for several days. Eliquis was placed on hold again due to these symptoms last night. Reports no vaginal bleeding noted yet this  morning. Still denies no pain with this vaginal bleeding when it occurs. No lower abdomen cramping or complaints.   Plan:  -HOLD apixaban for now x 5 days given ongoing vaginal bleeding risks. Resume 2.5mg BID thereafter  -Urgent referral for OBGYN referral in place for further testing needs---Scheduled for 5/23/25  -CMP, CBC with diff and CRP due weekly on Mondays as directed with no end date at this time. Fax results to [phone] ATTN Sindhu Del Rio     Physical deconditioning  Generalized muscle weakness  History of falls  Comment: Acute on chronic. Known poor history PTA. [facility] given concerns. Per therapy-Ambulates up to 200ft with upwalker. SBA for most needs and LB cares. 26/30 on the SLUMS, just below normal indicating MNCD  Plan:  -Continue Physical therapy and Occupational therapy  -Recommend cognitive testing on site  -Highly recommend MIKEY compliance post TCU     Electronically signed by:  Dr. Manda Flor DNP, APRN, FNP-C, WCS-C, EDS-C     Provider reviewed records from facility, and interpreted most recent imaging/lab work, and vital signs.   Acute and chronic conditions managed by writer. Have been reviewed during today's exam

## 2025-05-21 ENCOUNTER — LAB REQUISITION (OUTPATIENT)
Dept: LAB | Facility: CLINIC | Age: 75
End: 2025-05-21

## 2025-05-21 DIAGNOSIS — R10.9 UNSPECIFIED ABDOMINAL PAIN: ICD-10-CM

## 2025-05-21 LAB
ALBUMIN UR-MCNC: 10 MG/DL
APPEARANCE UR: ABNORMAL
BACTERIA #/AREA URNS HPF: ABNORMAL /HPF
BILIRUB UR QL STRIP: NEGATIVE
COLOR UR AUTO: ABNORMAL
GLUCOSE UR STRIP-MCNC: NEGATIVE MG/DL
HGB UR QL STRIP: ABNORMAL
KETONES UR STRIP-MCNC: NEGATIVE MG/DL
LEUKOCYTE ESTERASE UR QL STRIP: ABNORMAL
NITRATE UR QL: NEGATIVE
PH UR STRIP: 5 [PH] (ref 5–7)
RBC URINE: 11 /HPF
SP GR UR STRIP: 1.01 (ref 1–1.03)
UROBILINOGEN UR STRIP-MCNC: NORMAL MG/DL
WBC URINE: 71 /HPF

## 2025-05-22 ENCOUNTER — TRANSITIONAL CARE UNIT VISIT (OUTPATIENT)
Dept: GERIATRICS | Facility: CLINIC | Age: 75
End: 2025-05-22
Payer: MEDICARE

## 2025-05-22 VITALS
SYSTOLIC BLOOD PRESSURE: 116 MMHG | TEMPERATURE: 98.3 F | OXYGEN SATURATION: 98 % | BODY MASS INDEX: 28.63 KG/M2 | RESPIRATION RATE: 18 BRPM | HEART RATE: 63 BPM | WEIGHT: 182.4 LBS | DIASTOLIC BLOOD PRESSURE: 64 MMHG | HEIGHT: 67 IN

## 2025-05-22 DIAGNOSIS — L21.9 SEBORRHEIC DERMATITIS: ICD-10-CM

## 2025-05-22 DIAGNOSIS — M81.0 AGE-RELATED OSTEOPOROSIS WITHOUT CURRENT PATHOLOGICAL FRACTURE: ICD-10-CM

## 2025-05-22 DIAGNOSIS — Z87.898 HX OF BACTEREMIA: ICD-10-CM

## 2025-05-22 DIAGNOSIS — R53.81 PHYSICAL DECONDITIONING: ICD-10-CM

## 2025-05-22 DIAGNOSIS — Z98.890 S/P MOHS SURGERY FOR BASAL CELL CARCINOMA: ICD-10-CM

## 2025-05-22 DIAGNOSIS — I10 BENIGN ESSENTIAL HYPERTENSION: ICD-10-CM

## 2025-05-22 DIAGNOSIS — Z85.828 S/P MOHS SURGERY FOR BASAL CELL CARCINOMA: ICD-10-CM

## 2025-05-22 DIAGNOSIS — M25.519 CHRONIC SHOULDER PAIN, UNSPECIFIED LATERALITY: ICD-10-CM

## 2025-05-22 DIAGNOSIS — N18.32 STAGE 3B CHRONIC KIDNEY DISEASE (H): ICD-10-CM

## 2025-05-22 DIAGNOSIS — I48.0 PAF (PAROXYSMAL ATRIAL FIBRILLATION) (H): ICD-10-CM

## 2025-05-22 DIAGNOSIS — N18.32 TYPE 2 DIABETES MELLITUS WITH STAGE 3B CHRONIC KIDNEY DISEASE, WITHOUT LONG-TERM CURRENT USE OF INSULIN (H): ICD-10-CM

## 2025-05-22 DIAGNOSIS — F32.A DEPRESSION, UNSPECIFIED DEPRESSION TYPE: ICD-10-CM

## 2025-05-22 DIAGNOSIS — D64.9 CHRONIC ANEMIA: ICD-10-CM

## 2025-05-22 DIAGNOSIS — I10 HYPERTENSION GOAL BP (BLOOD PRESSURE) < 140/80: ICD-10-CM

## 2025-05-22 DIAGNOSIS — R04.0 EPISTAXIS: ICD-10-CM

## 2025-05-22 DIAGNOSIS — K59.01 SLOW TRANSIT CONSTIPATION: ICD-10-CM

## 2025-05-22 DIAGNOSIS — N93.9 VAGINAL BLEEDING: ICD-10-CM

## 2025-05-22 DIAGNOSIS — Z94.4 STATUS POST LIVER TRANSPLANTATION (H): ICD-10-CM

## 2025-05-22 DIAGNOSIS — E78.5 HYPERLIPIDEMIA LDL GOAL <70: ICD-10-CM

## 2025-05-22 DIAGNOSIS — E11.22 TYPE 2 DIABETES MELLITUS WITH STAGE 3B CHRONIC KIDNEY DISEASE, WITHOUT LONG-TERM CURRENT USE OF INSULIN (H): ICD-10-CM

## 2025-05-22 DIAGNOSIS — D84.9 IMMUNOCOMPROMISED: ICD-10-CM

## 2025-05-22 DIAGNOSIS — Z86.19 HX OF SEPSIS: ICD-10-CM

## 2025-05-22 DIAGNOSIS — M54.50 BILATERAL LOW BACK PAIN WITHOUT SCIATICA, UNSPECIFIED CHRONICITY: ICD-10-CM

## 2025-05-22 DIAGNOSIS — M62.81 GENERALIZED MUSCLE WEAKNESS: ICD-10-CM

## 2025-05-22 DIAGNOSIS — I50.32 CHRONIC DIASTOLIC HEART FAILURE (H): ICD-10-CM

## 2025-05-22 DIAGNOSIS — G89.29 CHRONIC SHOULDER PAIN, UNSPECIFIED LATERALITY: ICD-10-CM

## 2025-05-22 DIAGNOSIS — C44.329 SQUAMOUS CELL CANCER OF SKIN OF LEFT CHEEK: ICD-10-CM

## 2025-05-22 DIAGNOSIS — R79.89 ABNORMAL LIVER FUNCTION TESTS: ICD-10-CM

## 2025-05-22 DIAGNOSIS — Z91.81 PERSONAL HISTORY OF FALL: ICD-10-CM

## 2025-05-22 DIAGNOSIS — E03.9 HYPOTHYROIDISM, UNSPECIFIED TYPE: ICD-10-CM

## 2025-05-22 DIAGNOSIS — B99.9 INTRA-ABDOMINAL INFECTION: Primary | ICD-10-CM

## 2025-05-22 DIAGNOSIS — R42 DIZZINESS: ICD-10-CM

## 2025-05-22 DIAGNOSIS — G47.33 OSA (OBSTRUCTIVE SLEEP APNEA): ICD-10-CM

## 2025-05-22 DIAGNOSIS — C73 PAPILLARY THYROID CARCINOMA (H): ICD-10-CM

## 2025-05-22 DIAGNOSIS — G47.34 NOCTURNAL HYPOXEMIA: ICD-10-CM

## 2025-05-22 LAB
BACTERIA UR CULT: ABNORMAL
BACTERIA UR CULT: ABNORMAL

## 2025-05-22 NOTE — LETTER
5/22/2025      Luz Thompson  [address]  [facility] [address]        [facility] GERIATRICS    Chief Complaint   Patient presents with     RECHECK     HPI:  Luz Thompson is a 75 year old  (1950), who is being seen today for an episodic care visit at: [facility] ([place] [facility])([facility]) [[id number]]. Today's concern is: The primary encounter diagnosis was Intra-abdominal infection. Diagnoses of Slow transit constipation, Physical deconditioning, Generalized muscle weakness, Chronic shoulder pain, unspecified laterality, Age-related osteoporosis without current pathological fracture, Bilateral low back pain without sciatica, unspecified chronicity, Chronic anemia, 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, Hypothyroidism, unspecified type, S/P Mohs surgery for basal cell carcinoma, Nocturnal hypoxemia, Hyperlipidemia LDL goal <70, Stage 3b chronic kidney disease (H), Chronic diastolic heart failure (H), Status post liver transplantation (H), RASHIDA (obstructive sleep apnea), Type 2 diabetes mellitus with stage 3b chronic kidney disease, without long-term current use of insulin (H), PAF (paroxysmal atrial fibrillation) (H), Depression, unspecified depression type, Hypertension goal BP (blood pressure) < 140/80, Abnormal liver function tests, Immunocompromised, Hx of sepsis, Hx of bacteremia, Seborrheic dermatitis, Papillary thyroid carcinoma (H), Epistaxis, Vaginal bleeding, Benign essential hypertension, Dizziness, and Personal history of fall were also pertinent to this visit.    Met with patient who was finishing her breakfast waiting for her son to arrive to assist with transportation to her CT scan today. She denies any chest pain, palpitations, shortness of breath, [place], lightheadedness, dizziness, or cough. She occasionally reports abdominal discomfort with nausea complaints. Stable reports today. Tolerating IV antibiotic course at this time without any rashes noted.  "Denies B&B concerns. Denies dysuria or frequency. Pending UC results but low suspicions given IV ANTIBIOTIC course. She reports ongoing worsening vaginal bleeding for several days. Eliquis was placed on hold again due to these symptoms last night. Reports no vaginal bleeding noted yet this morning. Still denies no pain with this vaginal bleeding when it occurs. No lower abdomen cramping or complaints. Denies loose or constipation. Appetite good. Sleeping well.     BP Readings from Last 3 Encounters:   05/22/25 116/64   05/19/25 122/72   05/15/25 131/55     Wt Readings from Last 5 Encounters:   05/22/25 82.7 kg (182 lb 6.4 oz)   05/19/25 83.3 kg (183 lb 9.6 oz)   05/15/25 84 kg (185 lb 3.2 oz)   05/13/25 84 kg (185 lb 3.2 oz)   05/12/25 84 kg (185 lb 3.2 oz)     Allergies, and PMH/PSH reviewed in EPIC today.  REVIEW OF SYSTEMS:  4 point ROS including Respiratory, CV, GI and , other than that noted in the HPI,  is negative    Objective:   /64   Pulse 63   Temp 98.3  F (36.8  C)   Resp 18   Ht 1.702 m (5' 7\")   Wt 82.7 kg (182 lb 6.4 oz)   LMP 06/01/1988 (Approximate)   SpO2 98%   BMI 28.57 kg/m    GENERAL APPEARANCE:  Alert, in no distress, cooperative  ENT:  Mouth and posterior oropharynx normal, moist mucous membranes, Torres [place]  EYES:  EOM, conjunctivae, lids, pupils and irises normal  NECK:  No adenopathy,masses or thyromegaly  RESP:  respiratory effort and palpation of chest normal, lungs clear to auscultation , no respiratory distress  CV:  regular rate and rhythm, no murmur, rub, or gallop, no edema, +2 pedal pulses  ABDOMEN:  normal bowel sounds, soft, nontender, no hepatosplenomegaly or other masses, no guarding or rebound  M/S:   Ambulates with upwalker. High fall risk. Staff to assist where needed  SKIN:  Inspection of skin and subcutaneous tissue baseline, Palpation of skin and subcutaneous tissue baseline  NEURO:   Cranial nerves 2-12 are normal tested and grossly at patient's baseline, no " purposeful movement in upper and lower extremities  PSYCH:  oriented X 3, memory impaired , affect and mood normal    Most Recent 3 CBC's:  Recent Labs   Lab Test 05/19/25  1037 05/12/25  1140 05/05/25  1102   WBC 9.9 12.7* 15.6*   HGB 9.2* 10.0* 10.1*   MCV 90 89 90    413 400     Most Recent 3 BMP's:  Recent Labs   Lab Test 05/19/25  1037 05/12/25  1140 05/05/25  1102    137 140   POTASSIUM 5.2 5.0 4.7   CHLORIDE 107 102 103   CO2 19* 19* 22   BUN 90.6* 79.3* 44.3*   CR 2.14* 1.79* 1.54*   ANIONGAP 12 16* 15   KEILY 8.7* 9.0 9.3   GLC 88 74 93     Most Recent 2 LFT's:  Recent Labs   Lab Test 05/19/25  1037 05/12/25  1140   AST 14 29   ALT 22 47   ALKPHOS 118 124   BILITOTAL 0.3 0.3     7-Day Micro Results       Collected Updated Procedure Result Status      05/20/2025 2200 05/22/2025 0954 Urine Culture [[id number]]   (Abnormal)   Urine, Clean Catch    Preliminary result Component Value   Culture Culture in progress  [P]     >100,000 CFU/mL Gram negative bacilli  [P]                      Most Recent Urinalysis:  Recent Labs   Lab Test 05/20/25 2200 04/07/25  0945 04/07/25  0935   COLOR Light Yellow   < > Light Yellow   APPEARANCE Slightly Cloudy*   < > Slightly Cloudy*   URINEGLC Negative   < > Negative   URINEBILI Negative   < > Negative   URINEKETONE Negative   < > Negative   SG 1.013   < > 1.007   UBLD Large*   < > Trace*   URINEPH 5.0   < > 5.5   PROTEIN 10*   < > 10*   UROBILINOGEN  --   --  0.2   NITRITE Negative   < > Negative   LEUKEST Moderate*   < > Large*   RBCU 11*   < > 2-5*   WBCU 71*   < > >100*    < > = values in this interval not displayed.     Most Recent ESR & CRP:  Recent Labs   Lab Test 05/19/25  1037 04/28/25  1213 04/15/25  0613 06/13/23  1820 08/26/19  2331   SED  --   --  61*  --  78*   CRP  --   --   --   --  180.0*   CRPI <3.00   < >  --    < >  --     < > = values in this interval not displayed.     Most Recent Anemia Panel:  Recent Labs   Lab Test 05/19/25  1037  04/14/25  1410 04/07/25  0945 09/04/24  1008 06/15/23  1028   WBC 9.9   < > 6.5   < > 7.6   HGB 9.2*   < > 9.1*   < > 12.1   HCT 30.0*   < > 30.0*   < > 38.4   MCV 90   < > 88   < > 92      < > 351   < > 345   IRON  --   --  36*   < >  --    IRONSAT  --   --  13*   < >  --    FEB  --   --  277   < >  --    LAURA  --   --  44   < >  --    B12  --   --   --   --  456    < > = values in this interval not displayed.       ASSESSMENT/PLAN:  sepsis 2/2 intraabdominal infection - resolved  Recurrent UTIs on IV ertapenem since 3/9/2024  Recent Hx of E. Faecalis bacteremia  Sigmoid microperforation  Leukocytosis  Comment: Admitted for generalized weakness likely 2/2 infection. Found to have intraabdominal infection secondary to a sigmoid microperforation. Recent admission in March 2025 for Klebsiella pneumonia UTI and E. Faecalis bacteremia with recommendations from ID for ertapenem 1g q24h until EOT 4/22. RVP negative.Abdominal CT scan scheduled as directed for 5/5/25 at 1220pm--results as reviewed above--Urine screen assessed twice with negative bacteria. Today 5/16/25: She does report one episode of incontinence which she reports is a tell tale sign of her having a UTI. Remains Afebrile and current on IV antibiotic course as directed per ID. Tolerating IV ABx course without concerns  Plan:  -Follow up with ID as directed. Scheduled for 5/28/25  -Follow up with colorectal surgery as directed. Scheduled for 5/27/25 to further discuss sigmoidectomy needs  -Follow up with urology as directed. Scheduled for 6/10/25  -Repeat abdominal CT scan scheduled for 5/22/25 today  -Continue vaginal estradiol MWF at HS  -Pending UC results at this time. Low suspicions today   -Monitor urinary status  -Benadryl PRN--Administer Benadryl prn 30 min. before administration of IV antibiotic Tigecycline.  -Monitor for worsening s/symptoms of concerns  -Per ID on 5/8/25 IV abc course from switched from ertapenem to now Tigecycline 50mg IV  BID until 5/2825  -CMP, CBC with diff and CRP due weekly on Mondays as directed until 6/2/25. Fax results to [phone] AUSTYN Del Rio     Liver transplant in 2002 2/2 primary biliary cirrhosis  Comment: Chronic. Follows Dr. Ramirez.   Plan:  -Continue PTA sirolimus 1 mg every day  -Continue prednisone 9mg daily for now. (5mg with 4 tablets of 1mg=9mg total)  -Continue PTA ursodiol 250 mg BID  -Follow up with transplant team as directed. Scheduled for 5/28/25  -CMP, CBC with diff and CRP due weekly on Mondays as directed with no end date at this time. Fax results to [phone] AUSTYN Del Rio     Depression   Comment: Chronic. Stable today  Plan:  -Monitor mood and behaviors  -Monitor for worsening s/symptoms of concerns  -Monitor for changes in mobility, eating and sleeping patterns  -Continue zoloft 50mg daily.   -Follow up with psych post TCU  -CMP, CBC with diff and CRP due weekly on Mondays as directed with no end date at this time. Fax results to [phone] AUSTYN Del [place]     Mild Obstructive Sleep Apnea   Nocturnal Hypoxemia   Comment: Chronic. Sleep study in 2018 showing mild RASHIDA with recommendation to start CPAP. It does not appear that there was further follow up and not using CPAP.  Plan:  -Monitor sleeping patterns  -Monitor respiratory status  -PCP to consider sleep medicine referral  -CMP, CBC with diff and CRP due weekly on Mondays as directed with no end date at this time. Fax results to [phone] AUSTYN Del Rio     CKD3b w/ moderate proteinuria, at baseline  HTN  CVD/HLD  paroxysmal atrial fibrillation  Comment: Chronic. Baseline Cr. 1.5. History of dialysis at time of liver transplant 23 years ago. Recurrent AKIs and immunosuppressive medication toxicity. Not on SGLT2i 2/2 recurrent UTI. Chart review w/ unclear heart failure history, unconfirmed with EF 60-65% on 3/10/25. BNP 1446 elevated from 1 month ago. Given unclear HF history, trace bilateral LE edema,  with some pulm edema noted on imaging, consideration for volume overload. However, without orthopnea or clear cough.   Plan:  -Continue amlodipine 5mg daily. HOLD if SBP<100  -Discontinue meclizine given non use  -Continue PTA hydralazine 50mg TID. Hold for SBP <100mmHg.   -Continue PTA metoprolol succinate 25mg every day. HOLD if SBP<100 and/or HR<55  -Monitor BP and HR  -HOLD PTA evolocumab q2week. Follow up with PCP post TCU to resume. Resume off while on ANTIBIOTIC course  -Continue PTA eztimibe 10mg every day  -HOLD apixaban for now x 5 days given ongoing vaginal bleeding risks. Resume 2.5mg BID thereafter  -CMP, CBC with diff and CRP due weekly on Mondays as directed with no end date at this time. Fax results to [phone] AUSTYN Del Rio         T2DM  (A1c 6.8% 4/14/25)  Comment: Chronic. Last A1c 6.8% on 4/14/25.   Plan:  -Continue PTA linagliptin 5mg every day  -Continue PTA gabapentin 300mg at bedtime  -Blood Glucose monitoring back to previous schedule of BID. HOLD off on using freestyle phil 2 sensor while on TCU  -CMP, CBC with diff and CRP due weekly on Mondays as directed with no end date at this time. 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 Wound does not look infected. Today 5/7:She does report having skin lesion to left anterior forearm. 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 Will defer to dermatology  Plan:  -Monitor for worsening s/symptoms of concerns  -Follow up with dermatology regarding mass noted to left anterior forearm. Dermatology requested for PET scan which is scheduled for 5/29/25     anemia, chronic  Comment: Chronic. Hgb 9.9 on admit. Stable.  Plan:  -Monitor bleeding risks  -Continue PTA ferrous sulfate daily  -Continue folic acid daily  -CMP, CBC with diff and CRP due  weekly on  as directed with no end date at this time. Fax results to [phone] ATTDENNIS Sindhu [place]     Hypothyroidism  Papillary thyroid carcinoma  Chronic. Recent TSH~1.72 on 25  Plan;  -Continue PTA levothyroxine 175mcg qD   -Monitor for worsening s/symptoms of concerns  -CMP, CBC with diff and CRP due weekly on  as directed with no end date at this time. Fax results to [phone] AUSTYN Sindhu [place] Roi     Chronic Shoulder Pain  Osteoarthritis   Lower back pain  Comment: Chronic. stable  Plan:  -Monitor pain complaints  -Continue diclofenac gel application to painful areas QID  -Continue Tylenol 1000mg BID   -Continue gabapentin 300mg at HS--only able to tolerate liquid given gelatin capsule allergy  -CMP, CBC with diff and CRP due weekly on  as directed with no end date at this time. Fax results to [phone] AUSTYN Sindhu [place]     Constipation   Nausea  GERD  Comment: Chronic. S/T polypharmacy. She occasionally reports abdominal discomfort with nausea complaints. Stable reports today  Plan:  -Monitor BM patterns  -Continue TUMS 1000mg every 4 hours PRN  -Continue beneFiber qd    -Zofran PRN     Epistaxis  Comment: Acute on chronic. She reports occasional nose bleed at times. No episodes while on TCU.   Plan:  -Monitor bleeding risks  -Monitor for worsening s/symptoms of concerns  -Afrin BID PRN on TCU if warranted     Vaginal bleeding  Comment: Acute on chronic. Now having 4-5 episodes of painless vaginal bleeding noted since TCU admission. Last noted this AM 25. Urine screens x 2 negative. Last menstrual period she reports has been 40 years ago or so. Known family history of endometrial cancer that metastasized to bowel per her reports. She reports her mother had extensive genetic testing for review in EPIC. Reports mother name: Anne Yap  3/9/23 for reference if indicated. Today :She reports ongoing worsening vaginal bleeding for several days. Eliquis  was placed on hold again due to these symptoms last night. Reports no vaginal bleeding noted yet this morning. Still denies no pain with this vaginal bleeding when it occurs. No lower abdomen cramping or complaints.   Plan:  -HOLD apixaban for now x 5 days given ongoing vaginal bleeding risks. Resume 2.5mg BID thereafter  -Urgent referral for OBGYN referral in place for further testing needs---Scheduled for 5/23/25  -CMP, CBC with diff and CRP due weekly on Mondays as directed with no end date at this time. Fax results to [phone] ATTN Sindhu [place]     Physical deconditioning  Generalized muscle weakness  History of falls  Comment: Acute on chronic. Known poor history PTA. [facility] given concerns. Per therapy-Ambulates up to 200ft with upwalker. SBA for most needs and LB cares. 26/30 on the SLUMS, just below normal indicating MNCD  Plan:  -Continue Physical therapy and Occupational therapy  -Recommend cognitive testing on site  -Highly recommend half-way compliance post TCU     Electronically signed by:  Dr. Manda Flor DNP, APRN, FNP-C, WCS-C, EDS-C     Provider reviewed records from facility, and interpreted most recent imaging/lab work, and vital signs.   Acute and chronic conditions managed by writer. Have been reviewed during today's exam         Sincerely,        Manda Flor, LIZ CNP    Electronically signed

## 2025-05-23 NOTE — PROGRESS NOTES
[facility] GERIATRICS    Chief Complaint   Patient presents with    RECHECK     HPI:  Luz Thompson is a 75 year old  (1950), who is being seen today for an episodic care visit at: [facility] ([facility])([facility]) [[id number]]. Today's concern is: The primary encounter diagnosis was Intra-abdominal infection. Diagnoses of Slow transit constipation, Physical deconditioning, Generalized muscle weakness, Chronic shoulder pain, unspecified laterality, Age-related osteoporosis without current pathological fracture, Bilateral low back pain without sciatica, unspecified chronicity, Chronic anemia, 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, Hypothyroidism, unspecified type, S/P Mohs surgery for basal cell carcinoma, Nocturnal hypoxemia, Hyperlipidemia LDL goal <70, Stage 3b chronic kidney disease (H), Chronic diastolic heart failure (H), Status post liver transplantation (H), RASHIDA (obstructive sleep apnea), Type 2 diabetes mellitus with stage 3b chronic kidney disease, without long-term current use of insulin (H), PAF (paroxysmal atrial fibrillation) (H), Depression, unspecified depression type, Hypertension goal BP (blood pressure) < 140/80, Abnormal liver function tests, Immunocompromised, Hx of sepsis, Hx of bacteremia, Seborrheic dermatitis, Papillary thyroid carcinoma (H), Epistaxis, Vaginal bleeding, Benign essential hypertension, Dizziness, Personal history of fall, Pleural effusion, Thrush, SOB (shortness of breath), and Unspecified lesions of oral mucosa were also pertinent to this visit.    Patient went out to colo-rectal today for follow up appt and to discuss surgical options. Per colo-rectal appt today: This is a 75 year old F with a complex medical and surgical history, now with perforated diverticulitis on chronic abx. Giver her immunosuppression, an open sigmoidectomy is indicated. She is high risk for postop morbidity. The safest option would be to proceed with a lala's  procedure. However, she would prefer to restore intestinal continuity, which is not unreasonable. We had a long discussion. I will perform a sigmoidectomy with DLI. They asked appropriate questions, which were answered. Risks, benefits, and alternatives of operative treatment were thoroughly discussed with the patient, he/she understands these well and agrees to proceed.     There was additional fax orders at TCU today from Dr Ramirez transplant team given pending surgery of needing to come off sirolimus--therefore start tacrolimus 0.5mg BID and recheck labs in 3 days. Dr ramirez office will update patient when to officially stop the sirolimus. Please note per PCC notification with med-med interactions which I advised staff to update transplant as well with concerns: Coadministration of Tacrolimus Oral Capsule 0.5 MG and Sirolimus Oral Tablet 1 MG in combination with corticosteroids has been associated with fatal cases of bronchial anastomotic dehiscence in lung transplant patients. Additionally, trough concentrations of Tacrolimus Oral Capsule 0.5 MG may be decreased by Sirolimus Oral Tablet 1 MG resulting in a reduced pharmacologic effect of Tacrolimus Oral Capsule 0.5 MG.    Today- Met with patient who arrived back from her colo-rectal appt. She denies any chest pain, palpitations, lightheadedness, dizziness. Some reports of shortness of breath especially with activity with now a reported productive cough of greenish phlegm reported. Lungs clear on assessment today. New reports of oral white lesions on tongue for the past 3 days which on call provider was updated with orders for nystatin. She reports previously was hard to eat orally, but nystatin has been helping some. Now reports tingling around lips/mouth. Suspect IV tigecycline allergy risks. No relief from benadryl despite using before administration and after. She abdominal discomfort at this time. Occasional reports some abdominal cramping with gas complaints.  "Reports ongoing urinary incontinent but denies any dysuria complaints. Denies any further vaginal bleeding. Denies loose or constipation. Appetite good. Sleeping well.     Insurance gave LCD when IV antibiotic course ends 5/28 with anticipated discharge later this week. Given new changes, advised and recommend MDS to discuss with insurance about change of condition with orders to obtain additional days if able.       BP Readings from Last 3 Encounters:   05/27/25 128/64   05/27/25 97/50   05/23/25 126/64     Wt Readings from Last 5 Encounters:   05/27/25 82.7 kg (182 lb 6.4 oz)   05/27/25 81.6 kg (180 lb)   05/22/25 82.7 kg (182 lb 6.4 oz)   05/19/25 83.3 kg (183 lb 9.6 oz)   05/15/25 84 kg (185 lb 3.2 oz)     Allergies, and PMH/PSH reviewed in EPIC today.  REVIEW OF SYSTEMS:  4 point ROS including Respiratory, CV, GI and , other than that noted in the HPI,  is negative    Objective:   /64   Pulse 65   Temp 97.7  F (36.5  C)   Resp 18   Ht 1.702 m (5' 7\")   Wt 82.7 kg (182 lb 6.4 oz)   LMP 06/01/1988 (Approximate)   SpO2 96%   BMI 28.57 kg/m    GENERAL APPEARANCE:  Alert, cooperative  ENT:  Mouth and posterior oropharynx normal, moist mucous membranes, [place]  EYES:  EOM, conjunctivae, lids, pupils and irises normal  NECK:  No adenopathy,masses or thyromegaly  RESP:  respiratory effort and palpation of chest normal, lungs clear to auscultation , no respiratory distress  CV:  regular rate and rhythm, no murmur, rub, or gallop, peripheral edema 1+ in BLE  ABDOMEN:  normal bowel sounds, soft, nontender, no hepatosplenomegaly or other masses, no guarding or rebound  M/S:   Ambulates with upwalker. Staff to assist for ADLs, transfers and cares. HIGH FALL risk  SKIN:  Inspection of skin and subcutaneous tissue baseline, Palpation of skin and subcutaneous tissue baseline  NEURO:   Cranial nerves 2-12 are normal tested and grossly at patient's baseline, no purposeful movement in upper and lower " extremities  PSYCH:  oriented X 3, memory impaired , affect and mood normal    Most Recent 3 CBC's:  Recent Labs   Lab Test 05/27/25  1136 05/19/25  1037 05/12/25  1140   WBC 11.3* 9.9 12.7*   HGB 11.3* 9.2* 10.0*   MCV 90 90 89    357 413     Most Recent 3 BMP's:  Recent Labs   Lab Test 05/27/25  1136 05/19/25  1037 05/12/25  1140    138 137   POTASSIUM 4.6 5.2 5.0   CHLORIDE 97* 107 102   CO2 20* 19* 19*   .0* 90.6* 79.3*   CR 2.44* 2.14* 1.79*   ANIONGAP 19* 12 16*   KEILY 8.2* 8.7* 9.0   GLC 98 88 74     Most Recent 2 LFT's:  Recent Labs   Lab Test 05/27/25  1136 05/19/25  1037   AST 26 14   ALT 28 22   ALKPHOS 178* 118   BILITOTAL 0.4 0.3     Most Recent Cholesterol Panel:  Recent Labs   Lab Test 09/04/24  1008   CHOL 291*   *   HDL 72   TRIG 329*     7-Day Micro Results       Collected Updated Procedure Result Status      05/20/2025 2200 05/23/2025 2113 Urine Culture [[id number]]    (Abnormal)   Urine, Clean Catch    Final result Component Value   Culture >100,000 CFU/mL Klebsiella oxytoca ESBL        Susceptibility        Klebsiella oxytoca ESBL      MARYJANE      Ampicillin  Resistant  [1]       Cefazolin >=32 ug/mL Resistant      Cefepime  Resistant      Ceftazidime  Resistant      Ceftriaxone  Resistant      Ciprofloxacin >=4 ug/mL Resistant      Gentamicin <=1 ug/mL Susceptible      Levofloxacin 4 ug/mL Resistant      Meropenem <=0.25 ug/mL Susceptible  [2]       Nitrofurantoin 64 ug/mL Intermediate      Piperacillin/Tazobactam >=128 ug/mL Resistant      Trimethoprim/Sulfamethoxazole >16/304 ug/mL Resistant                   [1]  Intrinsically Resistant     [2]  Enterobacterales that are susceptible to meropenem are usually susceptible to ertapenem.               Susceptibility Comments       Klebsiella oxytoca ESBL    ESBL (extended spectrum beta lactamase) producing organisms require contact precautions.                     Most Recent Urinalysis:  Recent Labs   Lab Test  05/20/25  2200 04/07/25  0945 04/07/25  0935   COLOR Light Yellow   < > Light Yellow   APPEARANCE Slightly Cloudy*   < > Slightly Cloudy*   URINEGLC Negative   < > Negative   URINEBILI Negative   < > Negative   URINEKETONE Negative   < > Negative   SG 1.013   < > 1.007   UBLD Large*   < > Trace*   URINEPH 5.0   < > 5.5   PROTEIN 10*   < > 10*   UROBILINOGEN  --   --  0.2   NITRITE Negative   < > Negative   LEUKEST Moderate*   < > Large*   RBCU 11*   < > 2-5*   WBCU 71*   < > >100*    < > = values in this interval not displayed.     Most Recent ESR & CRP:  Recent Labs   Lab Test 05/27/25  1136 04/28/25  1213 04/15/25  0613 06/13/23  1820 08/26/19  2331   SED  --   --  61*  --  78*   CRP  --   --   --   --  180.0*   CRPI <3.00   < >  --    < >  --     < > = values in this interval not displayed.     Most Recent Anemia Panel:  Recent Labs   Lab Test 05/27/25  1136 04/14/25  1410 04/07/25  0945 09/04/24  1008 06/15/23  1028   WBC 11.3*   < > 6.5   < > 7.6   HGB 11.3*   < > 9.1*   < > 12.1   HCT 36.7   < > 30.0*   < > 38.4   MCV 90   < > 88   < > 92      < > 351   < > 345   IRON  --   --  36*   < >  --    IRONSAT  --   --  13*   < >  --    FEB  --   --  277   < >  --    LAURA  --   --  44   < >  --    B12  --   --   --   --  456    < > = values in this interval not displayed.       ASSESSMENT/PLAN:  sepsis 2/2 intraabdominal infection - resolved  Recurrent UTIs on IV ertapenem since 3/9/2024  Recent Hx of E. Faecalis bacteremia  Sigmoid microperforation  Leukocytosis  Comment: Admitted for generalized weakness likely 2/2 infection. Found to have intraabdominal infection secondary to a sigmoid microperforation. Recent admission in March 2025 for Klebsiella pneumonia UTI and E. Faecalis bacteremia with recommendations from ID for ertapenem 1g q24h until EOT 4/22. RVP negative then transitioned to tigecycline due to worsening abdominal CT results. Patient went out to colo-rectal today for follow up appt and to discuss  surgical options. Per colo-rectal appt today: This is a 75 year old F with a complex medical and surgical history, now with perforated diverticulitis on chronic abx. Giver her immunosuppression, an open sigmoidectomy is indicated. She is high risk for postop morbidity. The safest option would be to proceed with a lala's procedure. However, she would prefer to restore intestinal continuity, which is not unreasonable. We had a long discussion. I will perform a sigmoidectomy with DLI. They asked appropriate questions, which were answered. Risks, benefits, and alternatives of operative treatment were thoroughly discussed with the patient, he/she understands these well and agrees to proceed.   Plan:  -Follow up with ID as directed. Scheduled for 5/28/25 via telephone  -Follow up with colorectal surgery as directed. Pending surgery date. Will need pre op with PAC as advised.   -Follow up with urology as directed. Scheduled for 6/10/25  -Continue vaginal estradiol MWF at HS  -Monitor urinary status  -Discontinue scheduled benadryl prior to IV as we are discontinuing this today due to potential allergy risks  -Monitor for worsening s/symptoms of concerns  -Discontinue tigecycline due to allergy risks  -CMP, CBC with diff and CRP due weekly on Mondays as directed until 6/2/25. Fax results to [phone] ATTN Sindhu Del Rio     Recent repeat Abdominal CT on 5/22/25:  Narrative & Impression   EXAM: CT ABDOMEN PELVIS W/O CONTRAST  LOCATION: [facility]  DATE: 5/22/2025     INDICATION:  Urinary tract infection, Diverticulitis of large intestine with perforation and abscess without bleeding, Pyelonephritis, acute  COMPARISON: 5/5/2025  TECHNIQUE: CT scan of the abdomen and pelvis was performed without IV contrast. Multiplanar reformats were obtained. Dose reduction techniques were used.  CONTRAST: None.  LIMITATIONS: Lack of intravenous contrast     FINDINGS:   LOWER CHEST: Small bilateral pleural  effusions, increasing on the right. At least moderate coronary artery calcifications. Ill-defined areas of groundglass and tree-in-bud opacities in the right lower lobe, with some associated calcifications, similar to   prior CT dated 5/21/2019. There is a new area of peribronchial airspace disease in the left lower lobe. Partially imaged 3 mm left lower lobe pulmonary nodule, unchanged since 2019, benign.     HEPATOBILIARY: Prior hepatic transplant. Pneumobilia redemonstrated.     PANCREAS: Fatty atrophy without ductal dilatation. 1.5 cm possible IPMN in the uncinate process, image 74 series 3, is unchanged.     SPLEEN: Unremarkable     ADRENAL GLANDS: Unremarkable     KIDNEYS/BLADDER: 2 mm nonobstructing left lower pole renal calculus. 2 to 3 mm calcification in the upper pole the right kidney is favored vascular. No ureterolithiasis. Bladder is normal in contour without stones. Multiple pelvic phleboliths.     BOWEL: Multiple colonic diverticula. No small bowel obstruction. Chronic sigmoid diverticular disease. Decreasing size of pericolonic fluid collection, now 1.6 cm, previously 2.5 cm (image 144 series 3).     LYMPH NODES: No significant retroperitoneal adenopathy     VASCULATURE: Atherosclerotic disease without abdominal aortic aneurysm.     PELVIC ORGANS: Atrophic uterus. Trace pelvic free fluid, decreased.     MUSCULOSKELETAL: Diffuse osteopenia. Multilevel spondylosis. There are several small to moderate-sized fat-containing ventral hernias, unchanged. Edema/stranding in the subcutaneous soft tissues of the abdomen, greatest in the right lower abdomen,   unchanged.                                                                      IMPRESSION:   1.  Nonobstructing nephrolithiasis.  2.  Decreasing size of pelvic pericolonic fluid collection.  3.  New left lower lobe airspace disease compatible with pneumonia or aspiration. Clinical correlation and follow-up advised.  4.  Increasing right pleural effusion.      Liver transplant in 2002 2/2 primary biliary cirrhosis  Comment: Chronic. Follows Dr. Ramirez. There was additional fax orders at TCU today from Dr Ramirez transplant team given pending surgery of needing to come off sirolimus--therefore start tacrolimus 0.5mg BID and recheck labs in 3 days. Dr ramirez office will update patient when to officially stop the sirolimus. Please note per PCC notification with med-med interactions which I advised staff to update transplant as well with concerns: Coadministration of Tacrolimus Oral Capsule 0.5 MG and Sirolimus Oral Tablet 1 MG in combination with corticosteroids has been associated with fatal cases of bronchial anastomotic dehiscence in lung transplant patients. Additionally, trough concentrations of Tacrolimus Oral Capsule 0.5 MG may be decreased by Sirolimus Oral Tablet 1 MG resulting in a reduced pharmacologic effect of Tacrolimus Oral Capsule 0.5 MG.  Plan:  -Continue PTA sirolimus 1 mg every day as directed for now until directed to stop per transplant team  -Start tacrolimus 0.5mg BID for now per team.   -Continue prednisone 9mg daily for now. (5mg with 4 tablets of 1mg=9mg total)  -Continue PTA ursodiol 250 mg BID  -Follow up with transplant team as directed. Scheduled for 5/28/25  -CMP, CBC with diff and CRP due weekly on Mondays as directed with no end date at this time. Fax results to [phone] AUSTYN Del Rio  -BMP, CBC with diff and platelets, hepatic panel. And tacrolimus trough level due 5/30 around 0800/0900 per transplant team. Fax results to team at [phone] or urgent results at [phone]     Depression   Comment: Chronic. Stable today  Plan:  -Monitor mood and behaviors  -Monitor for worsening s/symptoms of concerns  -Monitor for changes in mobility, eating and sleeping patterns  -Continue zoloft 50mg daily.   -Follow up with psych post TCU  -CMP, CBC with diff and CRP due weekly on Mondays as directed with no end date at this time. Fax  results to [phone] AUSTYN Del Rio     Mild Obstructive Sleep Apnea   Nocturnal Hypoxemia   Pleural effusions  SOB  Comment: Chronic. Sleep study in 2018 showing mild RASHIDA with recommendation to start CPAP. It does not appear that there was further follow up and not using CPAP. See results of previous CT scan as indicated above. Today- She denies any chest pain, palpitations, lightheadedness, dizziness. Some reports of shortness of breath especially with activity with now a reported productive cough of greenish phlegm reported. Lungs clear on assessment today.   Plan:  -Monitor sleeping patterns  -On 5/23/25 noted on CT results of pleural effusions along with suspected pneumonia therefore previous provider ordered to start furosemide 20mg daily x 4 days with repeat CXR on 5/27--results pending at this time.   -start albuterol inhaler every 4 hours PRN. May HUNTER  -Monitor respiratory status  -PCP to consider sleep medicine referral  -CMP, CBC with diff and CRP due weekly on Mondays as directed with no end date at this time. Fax results to [phone] ATTDENNIS Del Rio     CKD3b w/ moderate proteinuria, at baseline  HTN  CVD/HLD  paroxysmal atrial fibrillation  Comment: Chronic. Baseline Cr. 1.5. History of dialysis at time of liver transplant 23 years ago. Recurrent AKIs and immunosuppressive medication toxicity. Not on SGLT2i 2/2 recurrent UTI. Chart review w/ unclear heart failure history, unconfirmed with EF 60-65% on 3/10/25. BNP 1446 elevated from 1 month ago. Given unclear HF history, trace bilateral LE edema, with some pulm edema noted on imaging, consideration for volume overload. However, without orthopnea or clear cough.   Plan:  -Continue amlodipine 5mg daily. HOLD if SBP<100  -Continue PTA hydralazine 50mg TID. Hold for SBP <100mmHg.   -Continue PTA metoprolol succinate 25mg every day. HOLD if SBP<100 and/or HR<55  -Monitor BP and HR  -HOLD PTA evolocumab q2week. Follow up with PCP post  TCU to resume. Resume off while on ANTIBIOTIC course  -Continue PTA eztimibe 10mg every day  -Continue apixaban 2.5mg BID  -CMP, CBC with diff and CRP due weekly on Mondays as directed with no end date at this time. Fax results to [phone] AUSTYN Del Rio     T2DM  (A1c 6.8% 4/14/25)  Comment: Chronic. Last A1c 6.8% on 4/14/25.   Plan:  -Continue PTA linagliptin 5mg every day  -Continue PTA gabapentin 300mg at bedtime  -Blood Glucose monitoring back to previous schedule of BID. HOLD off on using freestyle phil 2 sensor while on TCU  -CMP, CBC with diff and CRP due weekly on Mondays as directed with no end date at this time. 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 Wound does not look infected. She now report having skin lesion to left anterior forearm. 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 Will defer to dermatology  Plan:  -Monitor for worsening s/symptoms of concerns  -Follow up with dermatology regarding mass noted to left anterior forearm. Dermatology requested for PET scan which is scheduled for 5/29/25     anemia, chronic  Comment: Chronic. Hgb 9.9 on admit. Stable.  Plan:  -Monitor bleeding risks  -Continue PTA ferrous sulfate daily  -Continue folic acid daily  -CMP, CBC with diff and CRP due weekly on Mondays as directed with no end date at this time. Fax results to [phone] AUSTYN Del Rio     Hypothyroidism  Papillary thyroid carcinoma  Chronic. Recent TSH~1.72 on 4/4/25  Plan;  -Continue PTA levothyroxine 175mcg qD   -Monitor for worsening s/symptoms of concerns  -CMP, CBC with diff and CRP due weekly on Mondays as directed with no end date at this time. Fax results to [phone] AUSTYN Del Rio     Chronic Shoulder Pain  Osteoarthritis   Lower back pain  Comment: Chronic.  stable  Plan:  -Monitor pain complaints  -Continue diclofenac gel application to painful areas QID  -Continue Tylenol 1000mg BID   -Continue gabapentin 300mg at HS--only able to tolerate liquid given gelatin capsule allergy  -CMP, CBC with diff and CRP due weekly on  as directed with no end date at this time. Fax results to [phone] ATTN Sindhu Del Rio     Constipation   Nausea  GERD  Comment: Chronic. S/T polypharmacy. She occasionally reports abdominal discomfort with nausea complaints. Stable reports today  Plan:  -Monitor BM patterns  -Continue TUMS 1000mg every 4 hours PRN  -Continue beneFiber qd    -Zofran PRN     Epistaxis  Comment: Acute on chronic. She reports occasional nose bleed at times. No episodes while on TCU.   Plan:  -Monitor bleeding risks  -Monitor for worsening s/symptoms of concerns  -Afrin BID PRN on TCU if warranted     Vaginal bleeding  Comment: Acute on chronic. Now having 4-5 episodes of painless vaginal bleeding noted since TCU admission. Last menstrual period she reports has been 40 years ago or so. Known family history of endometrial cancer that metastasized to bowel per her reports. She reports her mother had extensive genetic testing for review in EPIC. Reports mother name: Anne Yap  3/9/23 for reference if indicated. Noted on :She reports ongoing worsening vaginal bleeding for several days. Eliquis was placed on hold again due to these symptoms last night. Reports no vaginal bleeding noted yet this morning. Still denies no pain with this vaginal bleeding when it occurs. No lower abdomen cramping or complaints.   Plan:  -Continue apixaban 2.5mg BID  -Monitor for worsening s/symptoms of concerns  -Per OBGYN noted on 25 recommended a pelvic ultrasound including transvaginal ultrasound to evaluate the uterus and ovaries. Scheduled for 25  -Per OBGYN on 25:  If the endometrial stripe is greater than 4 mm or if it is less than 4 mm and she  continues to have bleeding, she will need tissue evaluation with either office endometrial biopsy or hysteroscopy with D&C in the operating room.  She does not believe she can tolerate an endometrial biopsy in the clinic, so we discussed doing this as an outpatient procedure. Given the complexity of her health history and recent history, This would likely be best accomplished at the [facility] where her transplant team resides.  She has an appointment there in addition with colorectal surgery upcoming.  If they are planning surgical intervention, hopefully this could be combined with that, but she will need to see one of my colleagues at the [facility] to arrange the D&C portion of any planned procedure.    -CMP, CBC with diff and CRP due weekly on Mondays as directed with no end date at this time. Fax results to [phone] ATTN Sindhu Del Rio     Physical deconditioning  Generalized muscle weakness  History of falls  Comment: Acute on chronic. Known poor history PTA. [facility] given concerns. Per therapy-Ambulates up to 200ft with upwalker. SBA for most needs and LB cares. 26/30 on the SLUMS, just below normal indicating MNCD  Plan:  -Continue Physical therapy and Occupational therapy  -Recommend cognitive testing on site  -Highly recommend retirement compliance post TCU    Thrush  Unspecified oral lesions of mouth  Comment: Acute. Staff reported painful white patches in mouth that were affecting her oral intake. Reports approximately 3 days ago. Now with reports of tingling around mouth despite benadryl use. Potential allergy risk from IV tigecycline?  Plan:  -Continue nystatin as directed QID as directed  -Stop IV tigecycline as directed above for now  -Monitor oral intake  -Monitor for worsening s/x of concerns     60 minutes spent on the date of the encounter doing chart review, history and exam, PMH, documentation and further activities as  noted above. Collaboration with nursing staff on site, clinical managers, and therapies were completed on site today.     Electronically signed by:  Dr. Manda Flor DNP, APRN, FNP-C, WCS-C, EDS-C     Provider reviewed records from facility, and interpreted most recent imaging/lab work, and vital signs.   Acute and chronic conditions managed by writer. Have been reviewed during today's exam

## 2025-05-24 ENCOUNTER — TELEPHONE (OUTPATIENT)
Dept: GERIATRICS | Facility: CLINIC | Age: 75
End: 2025-05-24
Payer: MEDICARE

## 2025-05-25 ENCOUNTER — TELEPHONE (OUTPATIENT)
Dept: GERIATRICS | Facility: CLINIC | Age: 75
End: 2025-05-25
Payer: MEDICARE

## 2025-05-25 NOTE — PROGRESS NOTES
Nurse calling to report patient with white painful patches in mouth, unable to comfortably eat.     Orders: nystatin oral suspension 100,000/ml, give 20ml swish and spit qid x 10 days.     June Reynoso, CNP

## 2025-05-25 NOTE — TELEPHONE ENCOUNTER
Luz Thompson is a 75 year old  (1950), Nurse called today to report: Patient's Norvasc and calcitriol will not be available until noon okay to hold until the med arrives         Electronically signed by:   Alpa Hidalgo CNP

## 2025-05-26 ENCOUNTER — LAB REQUISITION (OUTPATIENT)
Dept: LAB | Facility: CLINIC | Age: 75
End: 2025-05-26
Payer: MEDICARE

## 2025-05-26 ENCOUNTER — LAB REQUISITION (OUTPATIENT)
Dept: LAB | Facility: CLINIC | Age: 75
DRG: 329 | End: 2025-05-26
Payer: MEDICARE

## 2025-05-26 DIAGNOSIS — N18.30 CHRONIC KIDNEY DISEASE, STAGE 3 UNSPECIFIED (H): ICD-10-CM

## 2025-05-26 DIAGNOSIS — D64.9 ANEMIA, UNSPECIFIED: ICD-10-CM

## 2025-05-26 DIAGNOSIS — K65.0: ICD-10-CM

## 2025-05-26 DIAGNOSIS — K65.1 PERITONEAL ABSCESS (H): ICD-10-CM

## 2025-05-26 DIAGNOSIS — Z51.81 ENCOUNTER FOR THERAPEUTIC DRUG LEVEL MONITORING: ICD-10-CM

## 2025-05-26 DIAGNOSIS — I10 ESSENTIAL (PRIMARY) HYPERTENSION: ICD-10-CM

## 2025-05-26 DIAGNOSIS — I15.2 HYPERTENSION SECONDARY TO ENDOCRINE DISORDERS: ICD-10-CM

## 2025-05-27 ENCOUNTER — ANCILLARY PROCEDURE (OUTPATIENT)
Dept: GENERAL RADIOLOGY | Facility: CLINIC | Age: 75
End: 2025-05-27
Attending: NURSE PRACTITIONER

## 2025-05-27 ENCOUNTER — TRANSITIONAL CARE UNIT VISIT (OUTPATIENT)
Dept: GERIATRICS | Facility: CLINIC | Age: 75
End: 2025-05-27
Payer: MEDICARE

## 2025-05-27 ENCOUNTER — TELEPHONE (OUTPATIENT)
Dept: SURGERY | Facility: CLINIC | Age: 75
End: 2025-05-27

## 2025-05-27 ENCOUNTER — OFFICE VISIT (OUTPATIENT)
Dept: SURGERY | Facility: CLINIC | Age: 75
End: 2025-05-27
Payer: MEDICARE

## 2025-05-27 ENCOUNTER — PRE VISIT (OUTPATIENT)
Dept: SURGERY | Facility: CLINIC | Age: 75
End: 2025-05-27

## 2025-05-27 ENCOUNTER — TELEPHONE (OUTPATIENT)
Dept: TRANSPLANT | Facility: CLINIC | Age: 75
End: 2025-05-27

## 2025-05-27 VITALS
HEART RATE: 65 BPM | SYSTOLIC BLOOD PRESSURE: 128 MMHG | RESPIRATION RATE: 18 BRPM | DIASTOLIC BLOOD PRESSURE: 64 MMHG | OXYGEN SATURATION: 96 % | TEMPERATURE: 97.7 F | WEIGHT: 182.4 LBS | BODY MASS INDEX: 28.63 KG/M2 | HEIGHT: 67 IN

## 2025-05-27 VITALS
WEIGHT: 180 LBS | BODY MASS INDEX: 28.25 KG/M2 | OXYGEN SATURATION: 99 % | HEIGHT: 67 IN | SYSTOLIC BLOOD PRESSURE: 97 MMHG | DIASTOLIC BLOOD PRESSURE: 50 MMHG | HEART RATE: 61 BPM | RESPIRATION RATE: 14 BRPM

## 2025-05-27 DIAGNOSIS — I10 HYPERTENSION GOAL BP (BLOOD PRESSURE) < 140/80: ICD-10-CM

## 2025-05-27 DIAGNOSIS — K59.01 SLOW TRANSIT CONSTIPATION: ICD-10-CM

## 2025-05-27 DIAGNOSIS — B37.0 THRUSH: ICD-10-CM

## 2025-05-27 DIAGNOSIS — M81.0 AGE-RELATED OSTEOPOROSIS WITHOUT CURRENT PATHOLOGICAL FRACTURE: ICD-10-CM

## 2025-05-27 DIAGNOSIS — R53.81 PHYSICAL DECONDITIONING: ICD-10-CM

## 2025-05-27 DIAGNOSIS — Z86.19 HX OF SEPSIS: ICD-10-CM

## 2025-05-27 DIAGNOSIS — Z94.4 H/O LIVER TRANSPLANT (H): ICD-10-CM

## 2025-05-27 DIAGNOSIS — Z85.828 S/P MOHS SURGERY FOR BASAL CELL CARCINOMA: ICD-10-CM

## 2025-05-27 DIAGNOSIS — R06.02 SOB (SHORTNESS OF BREATH): ICD-10-CM

## 2025-05-27 DIAGNOSIS — M62.81 GENERALIZED MUSCLE WEAKNESS: ICD-10-CM

## 2025-05-27 DIAGNOSIS — Z98.890 S/P MOHS SURGERY FOR BASAL CELL CARCINOMA: ICD-10-CM

## 2025-05-27 DIAGNOSIS — K57.32 DIVERTICULITIS OF COLON: Primary | ICD-10-CM

## 2025-05-27 DIAGNOSIS — R79.89 ABNORMAL LIVER FUNCTION TESTS: ICD-10-CM

## 2025-05-27 DIAGNOSIS — J90 PLEURAL EFFUSION: ICD-10-CM

## 2025-05-27 DIAGNOSIS — Z94.4 LIVER REPLACED BY TRANSPLANT (H): Primary | ICD-10-CM

## 2025-05-27 DIAGNOSIS — Z91.81 PERSONAL HISTORY OF FALL: ICD-10-CM

## 2025-05-27 DIAGNOSIS — J18.9 PNEUMONIA: ICD-10-CM

## 2025-05-27 DIAGNOSIS — D84.9 IMMUNOCOMPROMISED: ICD-10-CM

## 2025-05-27 DIAGNOSIS — C73 PAPILLARY THYROID CARCINOMA (H): ICD-10-CM

## 2025-05-27 DIAGNOSIS — M54.50 BILATERAL LOW BACK PAIN WITHOUT SCIATICA, UNSPECIFIED CHRONICITY: ICD-10-CM

## 2025-05-27 DIAGNOSIS — G47.34 NOCTURNAL HYPOXEMIA: ICD-10-CM

## 2025-05-27 DIAGNOSIS — C44.329 SQUAMOUS CELL CANCER OF SKIN OF LEFT CHEEK: ICD-10-CM

## 2025-05-27 DIAGNOSIS — D64.9 CHRONIC ANEMIA: ICD-10-CM

## 2025-05-27 DIAGNOSIS — M25.519 CHRONIC SHOULDER PAIN, UNSPECIFIED LATERALITY: ICD-10-CM

## 2025-05-27 DIAGNOSIS — L21.9 SEBORRHEIC DERMATITIS: ICD-10-CM

## 2025-05-27 DIAGNOSIS — N18.32 STAGE 3B CHRONIC KIDNEY DISEASE (H): ICD-10-CM

## 2025-05-27 DIAGNOSIS — R04.0 EPISTAXIS: ICD-10-CM

## 2025-05-27 DIAGNOSIS — I48.0 PAF (PAROXYSMAL ATRIAL FIBRILLATION) (H): ICD-10-CM

## 2025-05-27 DIAGNOSIS — F32.A DEPRESSION, UNSPECIFIED DEPRESSION TYPE: ICD-10-CM

## 2025-05-27 DIAGNOSIS — N18.32 TYPE 2 DIABETES MELLITUS WITH STAGE 3B CHRONIC KIDNEY DISEASE, WITHOUT LONG-TERM CURRENT USE OF INSULIN (H): ICD-10-CM

## 2025-05-27 DIAGNOSIS — Z94.4 STATUS POST LIVER TRANSPLANTATION (H): ICD-10-CM

## 2025-05-27 DIAGNOSIS — B99.9 INTRA-ABDOMINAL INFECTION: Primary | ICD-10-CM

## 2025-05-27 DIAGNOSIS — G89.29 CHRONIC SHOULDER PAIN, UNSPECIFIED LATERALITY: ICD-10-CM

## 2025-05-27 DIAGNOSIS — E03.9 HYPOTHYROIDISM, UNSPECIFIED TYPE: ICD-10-CM

## 2025-05-27 DIAGNOSIS — N39.0 URINARY TRACT INFECTION: ICD-10-CM

## 2025-05-27 DIAGNOSIS — R42 DIZZINESS: ICD-10-CM

## 2025-05-27 DIAGNOSIS — I50.32 CHRONIC DIASTOLIC HEART FAILURE (H): ICD-10-CM

## 2025-05-27 DIAGNOSIS — E78.5 HYPERLIPIDEMIA LDL GOAL <70: ICD-10-CM

## 2025-05-27 DIAGNOSIS — Z87.898 HX OF BACTEREMIA: ICD-10-CM

## 2025-05-27 DIAGNOSIS — G47.33 OSA (OBSTRUCTIVE SLEEP APNEA): ICD-10-CM

## 2025-05-27 DIAGNOSIS — I10 BENIGN ESSENTIAL HYPERTENSION: ICD-10-CM

## 2025-05-27 DIAGNOSIS — K13.70 UNSPECIFIED LESIONS OF ORAL MUCOSA: ICD-10-CM

## 2025-05-27 DIAGNOSIS — E66.3 OVERWEIGHT (BMI 25.0-29.9): ICD-10-CM

## 2025-05-27 DIAGNOSIS — N93.9 VAGINAL BLEEDING: ICD-10-CM

## 2025-05-27 DIAGNOSIS — E11.22 TYPE 2 DIABETES MELLITUS WITH STAGE 3B CHRONIC KIDNEY DISEASE, WITHOUT LONG-TERM CURRENT USE OF INSULIN (H): ICD-10-CM

## 2025-05-27 LAB
ALBUMIN SERPL BCG-MCNC: 3.6 G/DL (ref 3.5–5.2)
ALP SERPL-CCNC: 178 U/L (ref 40–150)
ALT SERPL W P-5'-P-CCNC: 28 U/L (ref 0–50)
ANION GAP SERPL CALCULATED.3IONS-SCNC: 19 MMOL/L (ref 7–15)
AST SERPL W P-5'-P-CCNC: 26 U/L (ref 0–45)
BASOPHILS # BLD AUTO: 0 10E3/UL (ref 0–0.2)
BASOPHILS NFR BLD AUTO: 0 %
BILIRUB SERPL-MCNC: 0.4 MG/DL
BUN SERPL-MCNC: 103 MG/DL (ref 8–23)
CALCIUM SERPL-MCNC: 8.2 MG/DL (ref 8.8–10.4)
CHLORIDE SERPL-SCNC: 97 MMOL/L (ref 98–107)
CREAT SERPL-MCNC: 2.44 MG/DL (ref 0.51–0.95)
CRP SERPL-MCNC: <3 MG/L
EGFRCR SERPLBLD CKD-EPI 2021: 20 ML/MIN/1.73M2
EOSINOPHIL # BLD AUTO: 0.1 10E3/UL (ref 0–0.7)
EOSINOPHIL NFR BLD AUTO: 1 %
ERYTHROCYTE [DISTWIDTH] IN BLOOD BY AUTOMATED COUNT: 18.2 % (ref 10–15)
GLUCOSE SERPL-MCNC: 98 MG/DL (ref 70–99)
HCO3 SERPL-SCNC: 20 MMOL/L (ref 22–29)
HCT VFR BLD AUTO: 36.7 % (ref 35–47)
HGB BLD-MCNC: 11.3 G/DL (ref 11.7–15.7)
IMM GRANULOCYTES # BLD: 0.1 10E3/UL
IMM GRANULOCYTES NFR BLD: 1 %
LYMPHOCYTES # BLD AUTO: 1.3 10E3/UL (ref 0.8–5.3)
LYMPHOCYTES NFR BLD AUTO: 11 %
MCH RBC QN AUTO: 27.8 PG (ref 26.5–33)
MCHC RBC AUTO-ENTMCNC: 30.8 G/DL (ref 31.5–36.5)
MCV RBC AUTO: 90 FL (ref 78–100)
MONOCYTES # BLD AUTO: 1.3 10E3/UL (ref 0–1.3)
MONOCYTES NFR BLD AUTO: 11 %
NEUTROPHILS # BLD AUTO: 8.7 10E3/UL (ref 1.6–8.3)
NEUTROPHILS NFR BLD AUTO: 76 %
NRBC # BLD AUTO: 0 10E3/UL
NRBC BLD AUTO-RTO: 0 /100
PLATELET # BLD AUTO: 370 10E3/UL (ref 150–450)
POTASSIUM SERPL-SCNC: 4.6 MMOL/L (ref 3.4–5.3)
PROT SERPL-MCNC: 6.5 G/DL (ref 6.4–8.3)
RBC # BLD AUTO: 4.07 10E6/UL (ref 3.8–5.2)
SODIUM SERPL-SCNC: 136 MMOL/L (ref 135–145)
WBC # BLD AUTO: 11.3 10E3/UL (ref 4–11)

## 2025-05-27 PROCEDURE — 36415 COLL VENOUS BLD VENIPUNCTURE: CPT | Performed by: NURSE PRACTITIONER

## 2025-05-27 PROCEDURE — 82435 ASSAY OF BLOOD CHLORIDE: CPT | Performed by: NURSE PRACTITIONER

## 2025-05-27 PROCEDURE — 86140 C-REACTIVE PROTEIN: CPT | Performed by: NURSE PRACTITIONER

## 2025-05-27 PROCEDURE — 71045 X-RAY EXAM CHEST 1 VIEW: CPT

## 2025-05-27 PROCEDURE — 85025 COMPLETE CBC W/AUTO DIFF WBC: CPT | Performed by: NURSE PRACTITIONER

## 2025-05-27 PROCEDURE — 84155 ASSAY OF PROTEIN SERUM: CPT | Performed by: NURSE PRACTITIONER

## 2025-05-27 PROCEDURE — 3078F DIAST BP <80 MM HG: CPT | Performed by: SURGERY

## 2025-05-27 PROCEDURE — 99310 SBSQ NF CARE HIGH MDM 45: CPT | Performed by: NURSE PRACTITIONER

## 2025-05-27 PROCEDURE — 1126F AMNT PAIN NOTED NONE PRSNT: CPT | Performed by: SURGERY

## 2025-05-27 PROCEDURE — 3074F SYST BP LT 130 MM HG: CPT | Performed by: SURGERY

## 2025-05-27 PROCEDURE — 99215 OFFICE O/P EST HI 40 MIN: CPT | Mod: 24 | Performed by: SURGERY

## 2025-05-27 RX ORDER — ONDANSETRON 4 MG/1
4 TABLET, FILM COATED ORAL EVERY 6 HOURS
Qty: 3 TABLET | Refills: 0 | Status: ON HOLD | OUTPATIENT
Start: 2025-05-27

## 2025-05-27 RX ORDER — MAGNESIUM CARB/ALUMINUM HYDROX 105-160MG
TABLET,CHEWABLE ORAL
Qty: 296 ML | Refills: 0 | Status: ON HOLD | OUTPATIENT
Start: 2025-05-27

## 2025-05-27 RX ORDER — TACROLIMUS 0.5 MG/1
0.5 CAPSULE ORAL 2 TIMES DAILY
Qty: 60 CAPSULE | Refills: 0 | Status: SHIPPED | OUTPATIENT
Start: 2025-05-27 | End: 2025-05-28

## 2025-05-27 RX ORDER — NEOMYCIN SULFATE 500 MG/1
1000 TABLET ORAL EVERY 6 HOURS
Qty: 6 TABLET | Refills: 0 | Status: ON HOLD | OUTPATIENT
Start: 2025-05-27

## 2025-05-27 RX ORDER — TACROLIMUS 0.5 MG/1
0.5 CAPSULE ORAL EVERY 12 HOURS
Qty: 60 CAPSULE | Refills: 11 | Status: ON HOLD | OUTPATIENT
Start: 2025-05-27

## 2025-05-27 RX ORDER — ALBUTEROL SULFATE 90 UG/1
2 INHALANT RESPIRATORY (INHALATION) EVERY 4 HOURS PRN
Qty: 18 G | Refills: 0 | Status: ON HOLD | OUTPATIENT
Start: 2025-05-27

## 2025-05-27 RX ORDER — METRONIDAZOLE 500 MG/1
500 TABLET ORAL EVERY 6 HOURS
Qty: 3 TABLET | Refills: 0 | Status: ON HOLD | OUTPATIENT
Start: 2025-05-27

## 2025-05-27 RX ORDER — POLYETHYLENE GLYCOL 3350 17 G/17G
POWDER, FOR SOLUTION ORAL
Qty: 238 G | Refills: 0 | Status: ON HOLD | OUTPATIENT
Start: 2025-05-27

## 2025-05-27 ASSESSMENT — PAIN SCALES - GENERAL: PAINLEVEL_OUTOF10: NO PAIN (0)

## 2025-05-27 NOTE — TELEPHONE ENCOUNTER
Pt having surgery. Reviewed with dr Ramirez.    Pt to start tacro and once level therapetuic ok to stop sirolimus.    In past patient has therapeutic level at tacro 0.5 mg BID.    Pt to start tacro 0.5 mg BID and repeat labs in 3 days.     Pt at [facility] in [facility].

## 2025-05-27 NOTE — TELEPHONE ENCOUNTER
The nurse calls from the unit stating that there is a contraindication for tacro and pred and tacro and sirolimus. Gave the nurse the verbal ok to have the tacro filled. COLE

## 2025-05-27 NOTE — PATIENT INSTRUCTIONS
Follow up:    Scheduling will give you a call within three business days to schedule surgery    Appointment you will need in prep: pre op physical with our anesthesia team, blood work, and ostomy nurse visit   You will need to do a full bowel prep with antibiotics the day before surgery. I have sent these to your pharmacy. You will receive a surgery packet through [facility] and mail with the instructions.   We will follow up with your transplant team about your anti-rejection medication     You will be undergoing a large operation. In preparation for your surgery we ask that you focus on a healthy lifestyle to help in the aid of your recovery and to reduce your post surgical complications. Below are a few guidelines to follow:    Schedule an appointment with your primary care physician to discuss how to best improve your overall health condition   If you have diabetes, please visit with your provider to optimize your blood sugar control   Engage in 30 minutes of exercise 5x a week or to the best of your ability. This can be in the form of walking, cycling, weight lifting, running or swimming  Focus on a healthy diet, high in fiber and protein  Vegetables and fruit at every meal   Lean proteins such as fish and chicken   Please start drinking protein shakes now till the surgery. Drink 1-2 protein shakes per day. Each shake should have 25-30 grams of protein   Drink at least 64 ounces of water daily   Avoid alcohol and excessive caffeine   Stop smoking if you are currently smoking     NENO Mendieta [phone]    Clinic Fax Number [phone]    Surgery Scheduling [phone]    My Chart is available 24 hours a day and is a secure way to access your records and communicate with your care team.  I strongly recommend signing up if you haven't already done so, if you are comfortable with computers.  If you would like to inquire about this or are having problems with My Chart access, you may call [phone] or go online at  [email or web address].  Please allow at least 24 hours for a response and extra time on weekends and Holidays.

## 2025-05-27 NOTE — LETTER
2025       RE: Luz Thompson  [address]  [place] [facility] [address]     Dear Colleague,    Thank you for referring your patient, Luz Thomspon, to the [facility] [place] at [facility]. Please see a copy of my visit note below.    Colon and Rectal Surgery Clinic Note    RE: Luz Thompson.  : 1950.  MINNIE: 2025.    Reason for visit: diverticulitis.    HPI: Luz Thompson is a 75 year old female who presents today for diverticulitis. She has a past medical history of anemia, anxiety, CKD stage 3, CVA, DVT on Eliquis, EBV, HLD, HTN, osteoarthritis, a fib, biliary cirrhosis s/p liver transplant in , Sjogren's syndrome, basal cell carcinoma s/p MOHS on 2025, thyroid cancer s/p thyroidectomy in , and type 2 diabetes (last A1C in April was 6.8). On 2025-2025 she was admitted for recurrent ESBL UTI and E. Faecalis bacteremia related to sigmoid microperforation due to diverticulitis. ID recommended IV ertapenem. She has no documented diverticulitis flares previous to this.     Last colonoscopy on 2017 which demonstrated multiple small-mouthed diverticula in the sigmoid colon.     Today Virginia feels OK. She reports some abdominal pain and intolerance to abx.     Colonoscopy (2017):  Findings:       Multiple small-mouthed diverticula were found in the sigmoid colon.                                                                                    Impression:          - Preparation of the colon was fair.                        - Diverticulosis in the sigmoid colon.                        - No specimens collected.                        - Rectal perforation upon retroflexion treated                        successfully with 3 clips.         CT Abdomen/Pelvis (12/10/2024):                                                    IMPRESSION:   1.  Left colonic diverticula.  Small amount of fluid adjacent to the sigmoid colon possibly secondary to acute diverticulitis. No additional inflammatory changes or evidence for obstruction.  2.  Constipation.  3.  Liver transplant.   4.  Calcified pleural plaques right lung likely related to prior asbestos exposure. Small groundglass opacities right lower lobe, of indeterminate time frame, possibly chronic.  5.  Tiny hyperdense lesions left kidney too small to further characterize, but could represent proteinaceous or hemorrhagic cysts.    CT Abdomen/Pelvis (4/14/2025):  IMPRESSION:  1.  New fat stranding and mesenteric edema about the distal sigmoid  colon in the midline, nonspecific, but may represent diverticulitis.  Suspected contained microperforation with small fluid collection  containing tiny focus of air posterior to the sigmoid colon.  2.  Distended bladder within normal limits.  3.  Unchanged fluid density 1.2 cm lesion in the region of the  pancreatic head.  4.  Stable postsurgical changes of liver transplant and curvilinear  hypodensity in the inferior right lobe.  5.  Other chronic CT findings including small bilateral pleural  effusions and right greater than left basilar calcified opacities are  unchanged.    CT Abdomen/Pelvis (5/5/2025):  IMPRESSION:   1. Mild enlargement of fluid collection containing focus of air  posterior to the sigmoid colon measuring up to 4.1 cm in craniocaudal  dimension, which remains concerning for a contained perforation from  prior diverticulitis.  2. 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  3. Postsurgical changes of liver transplant with stable curvilinear  hypodensity in the inferior right lobe. Interval new mild pneumobilia.  4. Reduced trace bilateral pleural effusions with adjacent  atelectasis.   5. Stable ancillary findings as detailed in the body of the report.     CBC Trend:   Component      Latest Ref Rng 4/28/2025  12:13 PM  5/5/2025  11:02 AM 5/12/2025  11:40 AM 5/19/2025  10:37 AM   WBC      4.0 - 11.0 10e3/uL 14.2 (H)  15.6 (H)  12.7 (H)  9.9    RBC Count      3.80 - 5.20 10e6/uL 3.70 (L)  3.66 (L)  3.62 (L)  3.33 (L)    Hemoglobin      11.7 - 15.7 g/dL 9.9 (L)  10.1 (L)  10.0 (L)  9.2 (L)    Hematocrit      35.0 - 47.0 % 32.7 (L)  32.9 (L)  32.1 (L)  30.0 (L)    MCV      78 - 100 fL 88  90  89  90    MCH      26.5 - 33.0 pg 26.8  27.6  27.6  27.6    MCHC      31.5 - 36.5 g/dL 30.3 (L)  30.7 (L)  31.2 (L)  30.7 (L)    RDW      10.0 - 15.0 % 18.3 (H)  18.8 (H)  18.4 (H)  18.5 (H)    Platelet Count      150 - 450 10e3/uL 497 (H)  400  413  357    % Neutrophils      % 74  75  60  75    % Lymphocytes      % 13  12  22  13    % Monocytes      % 9  10  14  10    % Eosinophils      % 1  1  1  1    % Basophils      % 1  0  1  0    % Immature Granulocytes      % 3  2  3  1    NRBC/W      <1 /100 0  0  0  0    Absolute Neutrophil      1.6 - 8.3 10e3/uL 10.4 (H)  11.7 (H)  7.6  7.4    Absolute Lymphocytes      0.8 - 5.3 10e3/uL 1.9  1.8  2.7  1.3    Absolute Monocytes      0.0 - 1.3 10e3/uL 1.3  1.6 (H)  1.7 (H)  1.0    Absolute Eosinophils      0.0 - 0.7 10e3/uL 0.1  0.1  0.2  0.1    Absolute Basophils      0.0 - 0.2 10e3/uL 0.1  0.1  0.1  0.0    Absolute Immature Granulocytes      <=0.4 10e3/uL 0.4  0.4  0.4  0.1    Absolute NRBCs      10e3/uL 0.0  0.0  0.0  0.0       CMP Trend:   Component      Latest Ref Rng 4/20/2025  6:37 AM 4/28/2025  12:13 PM 5/5/2025  11:02 AM 5/12/2025  11:40 AM 5/19/2025  10:37 AM   Sodium      135 - 145 mmol/L 143  139  140  137  138    Creatinine      0.51 - 0.95 mg/dL 1.33 (H)  1.45 (H)  1.54 (H)  1.79 (H)  2.14 (H)    Calcium      8.8 - 10.4 mg/dL 8.8  9.3  9.3  9.0  8.7 (L)    Protein Total      6.4 - 8.3 g/dL 5.8 (L)  6.5  6.7  6.5  5.4 (L)    Bilirubin Total      <=1.2 mg/dL <0.2  0.2  0.3  0.3  0.3    AST      0 - 45 U/L 27  19  20  29  14    ALT      0 - 50 U/L 50  11  14  47  22    GFR Estimate      >60  mL/min/1.73m2 41 (L)  37 (L)  35 (L)  29 (L)  23 (L)    Alkaline Phosphatase      40 - 150 U/L 97  91  101  124  118    Anion Gap      7 - 15 mmol/L 10  16 (H)  15  16 (H)  12    Potassium      3.4 - 5.3 mmol/L 4.2  4.5  4.7  5.0  5.2    Chloride      98 - 107 mmol/L 110 (H)  103  103  102  107    Carbon Dioxide (CO2)      22 - 29 mmol/L 23  20 (L)  22  19 (L)  19 (L)    Urea Nitrogen      8.0 - 23.0 mg/dL 35.6 (H)  39.9 (H)  44.3 (H)  79.3 (H)  90.6 (H)    Glucose      70 - 99 mg/dL 113 (H)  103 (H)  93  74  88    Albumin      3.5 - 5.2 g/dL 3.2 (L)  3.7  3.9  3.6  3.0 (L)       Medical history:  Anemia   Anxiety   CKD stage 3   CVA   DVT  EBV  HLD  HTN  Osteoarthritis   A fib  Biliary cirrhosis   Sjogren's syndrome   Thyroid cancer   Type 2 diabetes   Basal cell carcinoma     Surgical history:  Appendectomy   Cholecystectomy   Liver Transplant   Thyroidectomy   MOHS    Family history:  Family History   Problem Relation Age of Onset     Hypertension Mother      Endometrial Cancer Mother      Hyperlipidemia Mother      Prostate Cancer Father      Macular Degeneration Father      Cancer - colorectal Maternal Grandmother         in her 80's, has surgery and removal of part of kidney,  at age 98     Heart Disease Maternal Grandfather          at 98     Glaucoma Maternal Grandfather      Cerebrovascular Disease Paternal Grandmother         in her 80's     Hypertension Paternal Grandmother      Heart Disease Paternal Grandfather         MI     Alzheimer Disease Paternal Grandfather      Allergies Son      Neurologic Disorder Daughter         Migraines     Breast Cancer Other      Anesthesia Reaction No family hx of      Crohn's Disease No family hx of      Ulcerative Colitis No family hx of      Melanoma No family hx of      Skin Cancer No family hx of        Medications:  Current Outpatient Medications   Medication Sig Dispense Refill     acetaminophen (TYLENOL) 500 MG tablet Take 1,000 mg by mouth 2 times  daily. During 4/16/25 [facility] pt states take BID everyday       amLODIPine (NORVASC) 5 MG tablet Take 1 tablet (5 mg) by mouth daily. HOLD if SBP<100 30 tablet 2     apixaban ANTICOAGULANT (ELIQUIS) 2.5 MG tablet Take 1 tablet (2.5 mg) by mouth 2 times daily. 60 tablet 0     calcitRIOL (ROCALTROL) 0.25 MCG capsule Take 1 capsule (0.25 mcg) by mouth daily. 90 capsule 1     calcium carbonate (TUMS) 500 MG chewable tablet Take 2 tablets (1,000 mg) by mouth every 4 hours as needed for heartburn.       Continuous Glucose Sensor (FREESTYLE NINO 2 SENSOR) MISC Change every 14 days. 2 each 5     D-MANNOSE PO Take 1 Scoop by mouth 2 times daily.       diclofenac (VOLTAREN) 1 % topical gel Apply 4 g topically 4 times daily.       diphenhydrAMINE (BENADRYL) 25 MG tablet Take 25 mg by mouth every 6 hours as needed for itching or allergies.       EPINEPHrine (ANY BX GENERIC EQUIV) 0.3 MG/0.3ML injection 2-pack Inject 0.3 mLs (0.3 mg) into the muscle as needed for anaphylaxis. May repeat one time in 5-15 minutes if response to initial dose is inadequate. 2 each 1     estradiol (ESTRACE) 0.1 MG/GM vaginal cream Place vaginally three times a week.       evolocumab (REPATHA SURECLICK) 140 MG/ML prefilled autoinjector Inject 1 mL (140 mg) subcutaneously every 14 days. . Please have fasting labs drawn for further refills. 6 mL 3     ezetimibe (ZETIA) 10 MG tablet Take 1 tablet (10 mg) by mouth daily. 90 tablet 3     Ferrous Sulfate 324 MG TBEC Take 1 tablet by mouth daily.       folic acid (FOLVITE) 1 MG tablet TAKE 1 TABLET BY MOUTH DAILY 90 tablet 3     gabapentin (NEURONTIN) 250 MG/5ML solution Take 6 mLs (300 mg) by mouth at bedtime 180 mL 0     Glucagon (GVOKE HYPOPEN) 1 MG/0.2ML pen Inject the contents of 1 device under the skin into lower abdomen, outer thigh, or outer upper arm as needed for hypoglycemia. If no response after 15 minutes, additional 1 mg dose from a new device may be injected while waiting for emergency  assistance.       glucose (BD GLUCOSE) 5 g chewable tablet Take 2 tablets (10 g) by mouth as needed (low blood sugar) 40 tablet 1     glucose 40 % (400 mg/mL) gel Take 15-30 g by mouth every 15 minutes as needed for low blood sugar.       hydrALAZINE (APRESOLINE) 50 MG tablet Take 1 tablet (50 mg) by mouth 3 times daily. hold if SBP <100mmHg.       ketoconazole (NIZORAL) 2 % external shampoo Apply topically twice a week. Lather in the shower, wait 3-5 minutes before rinsing.       levothyroxine (SYNTHROID/LEVOTHROID) 175 MCG tablet Take 1 tablet (175 mcg) by mouth daily. 90 tablet 1     linagliptin (TRADJENTA) 5 MG TABS tablet Take 1 tablet (5 mg) by mouth daily. 90 tablet 1     magnesium citrate 1.745 GM/30ML solution Drink 1 bottle at 8pm the night before surgery 296 mL 0     metoprolol succinate ER (TOPROL XL) 25 MG 24 hr tablet Take 25 mg by mouth daily. HOLD if SBP<100 and/or HR<55       metroNIDAZOLE (FLAGYL) 500 MG tablet Take 1 tablet (500 mg) by mouth every 6 hours. At 8:00 am, 2:00 pm, 8:00 pm the day prior to your surgery with neomycin and zofran. 3 tablet 0     Multiple Vitamins-Minerals (PRESERVISION AREDS 2) CAPS Take 1 capsule by mouth 2 times daily       neomycin (MYCIFRADIN) 500 MG tablet Take 2 tablets (1,000 mg) by mouth every 6 hours. At 8:00 am, 2:00 pm, 8:00 pm the day prior to your surgery with flagyl and zofran. 6 tablet 0     NONFORMULARY Apply 1 Application topically daily as needed. Momma Bear Nerve Lotion - pt uses on feet       Nutrisource Fiber PO packet Take 1 packet by mouth daily. Benefiber       omega-3 acid ethyl esters (LOVAZA) 1 g capsule Take 1 capsule (1 g) by mouth 2 times daily.       ondansetron (ZOFRAN ODT) 4 MG ODT tab Take 1 tablet (4 mg) by mouth every 6 hours as needed.       ondansetron (ZOFRAN) 4 MG tablet Take 1 tablet (4 mg) by mouth every 6 hours. At 8:00 am, 2:00 pm, 8:00 pm the day prior to your surgery with neomycin and flagyl. 3 tablet 0     oxymetazoline  (AFRIN) 0.05 % nasal spray Spray 0.2 mLs (2 sprays) into both nostrils 2 times daily as needed for congestion. 30 mL 0     polyethylene glycol (MIRALAX) 17 GM/Dose powder Please take 238 grams mixed with 64 oz of Gatorade at 4pm the night before surgery 238 g 0     predniSONE (DELTASONE) 1 MG tablet Take 4 tablets (4 mg) by mouth daily. Take 4mg (1mg tablets x4) and Take with 5mg tablet to equal 9mg daily 90 tablet 0     predniSONE (DELTASONE) 5 MG tablet Take 1 tablet (5 mg) by mouth daily. Take with 5mg tablet with 4mg (1mg tablets x4) to equal 9mg daily 30 tablet 0     sertraline (ZOLOFT) 50 MG tablet Take 1 tablet (50 mg) by mouth daily. 30 tablet 0     sirolimus (GENERIC EQUIVALENT) 1 MG tablet Take 1 tablet (1 mg) by mouth daily. 90 tablet 3     tigecycline (TYGACIL) 50 MG injection Inject 50 mg into the vein every 12 hours for 15 days.       triamcinolone (KENALOG) 0.1 % external ointment Apply topically 2 times daily. Use 2 weeks or less. 80 g 0     ursodiol (ACTIGALL) 250 MG tablet Take 1 tablet (250 mg) by mouth 2 times daily. 180 tablet 3       Allergies:  Allergies   Allergen Reactions     Fluconazole Hives and Itching     Full body hives  **Intradermal skin testing negative**  [See intradermal skin testing results from 8/2/2019]     Mycophenolate Diarrhea and Nausea and Vomiting     Patient stated it was chronic and lasted months       Penicillins Anaphylaxis, Hives, Itching and Rash     **Intradermal skin testing negative**  [See intradermal skin testing results from 8/2/2019]       Simvastatin Muscle Pain (Myalgia)     severe  Other reaction(s): Myalgia caused by statin     Methotrexate Other (See Comments)     Other reaction(s): Sore  Sores in mouth, esophagus, and stomach.        Morphine And Codeine Itching and Other (See Comments)     Psych disturbance  Other reaction(s): Confusion, Mood alteration     Quinolones Anxiety, Dizziness, Headache, Other (See Comments), Palpitations and Unknown      "Other reaction(s): Hyperactive behavior, Lightheadedness, Mood alteration    Dizzy, light headed    Dizziness, shaky, and jumpy     Capsules, Empty Gelatin [Gelatin]      Carvedilol Diarrhea     Per pt - severe diarrhea and LE swelling  + tolerating Toprol     Lansoprazole Diarrhea     Strawberry Extract      Azithromycin Itching     [See intradermal skin testing results from 2019]     Bactrim [Sulfamethoxazole-Trimethoprim] Other (See Comments)     Numb mouth, tingling lips (treated with anti-histamines)     Cephalosporins Itching     [See intradermal skin testing results from 2019]     Ciprofloxacin Hcl Other (See Comments) and Dizziness     Insomnia, mood lability, Irregular heart beat          Doxycycline Itching and Unknown     [See intradermal skin testing results from 2019]     Lisinopril Cough     Omeprazole Itching     Tolectin [Nsaids] Rash     Tolmetin Rash and Itching     Tramadol Rash, Hives and Itching       Social history:   Social History     Tobacco Use     Smoking status: Former     Current packs/day: 0.00     Average packs/day: 1 pack/day for 18.0 years (18.0 ttl pk-yrs)     Types: Cigarettes     Start date: 1967     Quit date: 1985     Years since quittin.1     Smokeless tobacco: Never   Substance Use Topics     Alcohol use: Yes     Alcohol/week: 0.0 standard drinks of alcohol     Comment: rare - \"I toast at weddings\"     Marital status: .    ROS:  A complete review of systems was performed with the patient and all systems negative except as per HPI.    Physical Examination:  BP 97/50 (BP Location: Left arm, Patient Position: Sitting, Cuff Size: Adult Regular)   Pulse 61   Resp 14   Ht 1.702 m (5' 7\")   Wt 81.6 kg (180 lb)   LMP 1988 (Approximate)   SpO2 99%   BMI 28.19 kg/m    General: Well hydrated. No acute distress.  CV: RRR  Lung: Non-labored breathing on RA  Abdomen: Soft, NT. Well healed incisions.      ASSESSMENT    This is a 75 year old F " with a complex medical and surgical history, now with perforated diverticulitis on chronic abx. Giver her immunosuppression, an open sigmoidectomy is indicated. She is high risk for postop morbidity. The safest option would be to proceed with a lala's procedure. However, she would prefer to restore intestinal continuity, which is not unreasonable. We had a long discussion. I will perform a sigmoidectomy with DLI. They asked appropriate questions, which were answered. Risks, benefits, and alternatives of operative treatment were thoroughly discussed with the patient, he/she understands these well and agrees to proceed.    All pertinent labs and imaging were personally reviewed by me.      PLAN  - To OR for open sigmoidectomy and DLI  - Mech/abx bowel prep  - Preop teaching and eval  - WOCN to diamond  - Urology to place stents  - Colonoscopy after surgery  - Referral to [facility] hepatology for preop optimization and follow up of IPMN. Would need to come off sirolimus.  - Healthy life style and weight loss were encouraged      40 minutes spent on the date of the encounter doing chart review, history and exam, imaging review, documentation and further activities as noted above.      Al Campbell MD, FACS, FASCRS, FSSO    Division of Colon and Rectal Surgery  [facility]    Referring Provider:  No referring provider defined for this encounter.     Primary Care Provider:  Natividad Lundberg      Again, thank you for allowing me to participate in the care of your patient.      Sincerely,    Al Campbell MD

## 2025-05-27 NOTE — LETTER
5/27/2025      Luz Thompson  [address]  [facility] [address]        [facility] GERIATRICS    Chief Complaint   Patient presents with     RECHECK     HPI:  uLz Thompson is a 75 year old  (1950), who is being seen today for an episodic care visit at: [facility] ([facility])([facility]) [[id number]]. Today's concern is: The primary encounter diagnosis was Intra-abdominal infection. Diagnoses of Slow transit constipation, Physical deconditioning, Generalized muscle weakness, Chronic shoulder pain, unspecified laterality, Age-related osteoporosis without current pathological fracture, Bilateral low back pain without sciatica, unspecified chronicity, Chronic anemia, 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, Hypothyroidism, unspecified type, S/P Mohs surgery for basal cell carcinoma, Nocturnal hypoxemia, Hyperlipidemia LDL goal <70, Stage 3b chronic kidney disease (H), Chronic diastolic heart failure (H), Status post liver transplantation (H), RASHIDA (obstructive sleep apnea), Type 2 diabetes mellitus with stage 3b chronic kidney disease, without long-term current use of insulin (H), PAF (paroxysmal atrial fibrillation) (H), Depression, unspecified depression type, Hypertension goal BP (blood pressure) < 140/80, Abnormal liver function tests, Immunocompromised, Hx of sepsis, Hx of bacteremia, Seborrheic dermatitis, Papillary thyroid carcinoma (H), Epistaxis, Vaginal bleeding, Benign essential hypertension, Dizziness, Personal history of fall, Pleural effusion, Thrush, SOB (shortness of breath), and Unspecified lesions of oral mucosa were also pertinent to this visit.    Patient went out to colo-rectal today for follow up appt and to discuss surgical options. Per colo-rectal appt today: This is a 75 year old F with a complex medical and surgical history, now with perforated diverticulitis on chronic abx. Giver her immunosuppression, an open sigmoidectomy is indicated. She is  high risk for postop morbidity. The safest option would be to proceed with a lala's procedure. However, she would prefer to restore intestinal continuity, which is not unreasonable. We had a long discussion. I will perform a sigmoidectomy with DLI. They asked appropriate questions, which were answered. Risks, benefits, and alternatives of operative treatment were thoroughly discussed with the patient, he/she understands these well and agrees to proceed.     There was additional fax orders at TCU today from Dr Ramirez transplant team given pending surgery of needing to come off sirolimus--therefore start tacrolimus 0.5mg BID and recheck labs in 3 days. Dr ramirez office will update patient when to officially stop the sirolimus. Please note per PCC notification with med-med interactions which I advised staff to update transplant as well with concerns: Coadministration of Tacrolimus Oral Capsule 0.5 MG and Sirolimus Oral Tablet 1 MG in combination with corticosteroids has been associated with fatal cases of bronchial anastomotic dehiscence in lung transplant patients. Additionally, trough concentrations of Tacrolimus Oral Capsule 0.5 MG may be decreased by Sirolimus Oral Tablet 1 MG resulting in a reduced pharmacologic effect of Tacrolimus Oral Capsule 0.5 MG.    Today- Met with patient who arrived back from her colo-rectal appt. She denies any chest pain, palpitations, lightheadedness, dizziness. Some reports of shortness of breath especially with activity with now a reported productive cough of greenish phlegm reported. Lungs clear on assessment today. New reports of oral white lesions on tongue for the past 3 days which on call provider was updated with orders for nystatin. She reports previously was hard to eat orally, but nystatin has been helping some. Now reports tingling around lips/mouth. Suspect IV tigecycline allergy risks. No relief from benadryl despite using before administration and after. She abdominal  "discomfort at this time. Occasional reports some abdominal cramping with gas complaints. Reports ongoing urinary incontinent but denies any dysuria complaints. Denies any further vaginal bleeding. Denies loose or constipation. Appetite good. Sleeping well.     Insurance gave LCD when IV antibiotic course ends 5/28 with anticipated discharge later this week. Given new changes, advised and recommend MDS to discuss with insurance about change of condition with orders to obtain additional days if able.       BP Readings from Last 3 Encounters:   05/27/25 128/64   05/27/25 97/50   05/23/25 126/64     Wt Readings from Last 5 Encounters:   05/27/25 82.7 kg (182 lb 6.4 oz)   05/27/25 81.6 kg (180 lb)   05/22/25 82.7 kg (182 lb 6.4 oz)   05/19/25 83.3 kg (183 lb 9.6 oz)   05/15/25 84 kg (185 lb 3.2 oz)     Allergies, and PMH/PSH reviewed in EPIC today.  REVIEW OF SYSTEMS:  4 point ROS including Respiratory, CV, GI and , other than that noted in the HPI,  is negative    Objective:   /64   Pulse 65   Temp 97.7  F (36.5  C)   Resp 18   Ht 1.702 m (5' 7\")   Wt 82.7 kg (182 lb 6.4 oz)   LMP 06/01/1988 (Approximate)   SpO2 96%   BMI 28.57 kg/m    GENERAL APPEARANCE:  Alert, cooperative  ENT:  Mouth and posterior oropharynx normal, moist mucous membranes, [place]  EYES:  EOM, conjunctivae, lids, pupils and irises normal  NECK:  No adenopathy,masses or thyromegaly  RESP:  respiratory effort and palpation of chest normal, lungs clear to auscultation , no respiratory distress  CV:  regular rate and rhythm, no murmur, rub, or gallop, peripheral edema 1+ in BLE  ABDOMEN:  normal bowel sounds, soft, nontender, no hepatosplenomegaly or other masses, no guarding or rebound  M/S:   Ambulates with upwalker. Staff to assist for ADLs, transfers and cares. HIGH FALL risk  SKIN:  Inspection of skin and subcutaneous tissue baseline, Palpation of skin and subcutaneous tissue baseline  NEURO:   Cranial nerves 2-12 are normal tested and " grossly at patient's baseline, no purposeful movement in upper and lower extremities  PSYCH:  oriented X 3, memory impaired , affect and mood normal    Most Recent 3 CBC's:  Recent Labs   Lab Test 05/27/25  1136 05/19/25  1037 05/12/25  1140   WBC 11.3* 9.9 12.7*   HGB 11.3* 9.2* 10.0*   MCV 90 90 89    357 413     Most Recent 3 BMP's:  Recent Labs   Lab Test 05/27/25  1136 05/19/25  1037 05/12/25  1140    138 137   POTASSIUM 4.6 5.2 5.0   CHLORIDE 97* 107 102   CO2 20* 19* 19*   .0* 90.6* 79.3*   CR 2.44* 2.14* 1.79*   ANIONGAP 19* 12 16*   KEILY 8.2* 8.7* 9.0   GLC 98 88 74     Most Recent 2 LFT's:  Recent Labs   Lab Test 05/27/25  1136 05/19/25  1037   AST 26 14   ALT 28 22   ALKPHOS 178* 118   BILITOTAL 0.4 0.3     Most Recent Cholesterol Panel:  Recent Labs   Lab Test 09/04/24  1008   CHOL 291*   *   HDL 72   TRIG 329*     7-Day Micro Results       Collected Updated Procedure Result Status      05/20/2025 2200 05/23/2025 2113 Urine Culture [[id number]]    (Abnormal)   Urine, Clean Catch    Final result Component Value   Culture >100,000 CFU/mL Klebsiella oxytoca ESBL        Susceptibility        Klebsiella oxytoca ESBL      MARYJANE      Ampicillin  Resistant  [1]       Cefazolin >=32 ug/mL Resistant      Cefepime  Resistant      Ceftazidime  Resistant      Ceftriaxone  Resistant      Ciprofloxacin >=4 ug/mL Resistant      Gentamicin <=1 ug/mL Susceptible      Levofloxacin 4 ug/mL Resistant      Meropenem <=0.25 ug/mL Susceptible  [2]       Nitrofurantoin 64 ug/mL Intermediate      Piperacillin/Tazobactam >=128 ug/mL Resistant      Trimethoprim/Sulfamethoxazole >16/304 ug/mL Resistant                   [1]  Intrinsically Resistant     [2]  Enterobacterales that are susceptible to meropenem are usually susceptible to ertapenem.               Susceptibility Comments       Klebsiella oxytoca ESBL    ESBL (extended spectrum beta lactamase) producing organisms require contact precautions.                      Most Recent Urinalysis:  Recent Labs   Lab Test 05/20/25  2200 04/07/25  0945 04/07/25  0935   COLOR Light Yellow   < > Light Yellow   APPEARANCE Slightly Cloudy*   < > Slightly Cloudy*   URINEGLC Negative   < > Negative   URINEBILI Negative   < > Negative   URINEKETONE Negative   < > Negative   SG 1.013   < > 1.007   UBLD Large*   < > Trace*   URINEPH 5.0   < > 5.5   PROTEIN 10*   < > 10*   UROBILINOGEN  --   --  0.2   NITRITE Negative   < > Negative   LEUKEST Moderate*   < > Large*   RBCU 11*   < > 2-5*   WBCU 71*   < > >100*    < > = values in this interval not displayed.     Most Recent ESR & CRP:  Recent Labs   Lab Test 05/27/25  1136 04/28/25  1213 04/15/25  0613 06/13/23  1820 08/26/19  2331   SED  --   --  61*  --  78*   CRP  --   --   --   --  180.0*   CRPI <3.00   < >  --    < >  --     < > = values in this interval not displayed.     Most Recent Anemia Panel:  Recent Labs   Lab Test 05/27/25  1136 04/14/25  1410 04/07/25  0945 09/04/24  1008 06/15/23  1028   WBC 11.3*   < > 6.5   < > 7.6   HGB 11.3*   < > 9.1*   < > 12.1   HCT 36.7   < > 30.0*   < > 38.4   MCV 90   < > 88   < > 92      < > 351   < > 345   IRON  --   --  36*   < >  --    IRONSAT  --   --  13*   < >  --    FEB  --   --  277   < >  --    LAURA  --   --  44   < >  --    B12  --   --   --   --  456    < > = values in this interval not displayed.       ASSESSMENT/PLAN:  sepsis 2/2 intraabdominal infection - resolved  Recurrent UTIs on IV ertapenem since 3/9/2024  Recent Hx of E. Faecalis bacteremia  Sigmoid microperforation  Leukocytosis  Comment: Admitted for generalized weakness likely 2/2 infection. Found to have intraabdominal infection secondary to a sigmoid microperforation. Recent admission in March 2025 for Klebsiella pneumonia UTI and E. Faecalis bacteremia with recommendations from ID for ertapenem 1g q24h until EOT 4/22. RVP negative then transitioned to tigecycline due to worsening abdominal CT results. Patient  went out to colo-rectal today for follow up appt and to discuss surgical options. Per colo-rectal appt today: This is a 75 year old F with a complex medical and surgical history, now with perforated diverticulitis on chronic abx. Giver her immunosuppression, an open sigmoidectomy is indicated. She is high risk for postop morbidity. The safest option would be to proceed with a lala's procedure. However, she would prefer to restore intestinal continuity, which is not unreasonable. We had a long discussion. I will perform a sigmoidectomy with DLI. They asked appropriate questions, which were answered. Risks, benefits, and alternatives of operative treatment were thoroughly discussed with the patient, he/she understands these well and agrees to proceed.   Plan:  -Follow up with ID as directed. Scheduled for 5/28/25 via telephone  -Follow up with colorectal surgery as directed. Pending surgery date. Will need pre op with PAC as advised.   -Follow up with urology as directed. Scheduled for 6/10/25  -Continue vaginal estradiol MWF at HS  -Monitor urinary status  -Discontinue scheduled benadryl prior to IV as we are discontinuing this today due to potential allergy risks  -Monitor for worsening s/symptoms of concerns  -Discontinue tigecycline due to allergy risks  -CMP, CBC with diff and CRP due weekly on Mondays as directed until 6/2/25. Fax results to [phone] ATTN Sindhu Del Rio     Recent repeat Abdominal CT on 5/22/25:  Narrative & Impression   EXAM: CT ABDOMEN PELVIS W/O CONTRAST  LOCATION: [facility]  DATE: 5/22/2025     INDICATION:  Urinary tract infection, Diverticulitis of large intestine with perforation and abscess without bleeding, Pyelonephritis, acute  COMPARISON: 5/5/2025  TECHNIQUE: CT scan of the abdomen and pelvis was performed without IV contrast. Multiplanar reformats were obtained. Dose reduction techniques were used.  CONTRAST: None.  LIMITATIONS: Lack of  intravenous contrast     FINDINGS:   LOWER CHEST: Small bilateral pleural effusions, increasing on the right. At least moderate coronary artery calcifications. Ill-defined areas of groundglass and tree-in-bud opacities in the right lower lobe, with some associated calcifications, similar to   prior CT dated 5/21/2019. There is a new area of peribronchial airspace disease in the left lower lobe. Partially imaged 3 mm left lower lobe pulmonary nodule, unchanged since 2019, benign.     HEPATOBILIARY: Prior hepatic transplant. Pneumobilia redemonstrated.     PANCREAS: Fatty atrophy without ductal dilatation. 1.5 cm possible IPMN in the uncinate process, image 74 series 3, is unchanged.     SPLEEN: Unremarkable     ADRENAL GLANDS: Unremarkable     KIDNEYS/BLADDER: 2 mm nonobstructing left lower pole renal calculus. 2 to 3 mm calcification in the upper pole the right kidney is favored vascular. No ureterolithiasis. Bladder is normal in contour without stones. Multiple pelvic phleboliths.     BOWEL: Multiple colonic diverticula. No small bowel obstruction. Chronic sigmoid diverticular disease. Decreasing size of pericolonic fluid collection, now 1.6 cm, previously 2.5 cm (image 144 series 3).     LYMPH NODES: No significant retroperitoneal adenopathy     VASCULATURE: Atherosclerotic disease without abdominal aortic aneurysm.     PELVIC ORGANS: Atrophic uterus. Trace pelvic free fluid, decreased.     MUSCULOSKELETAL: Diffuse osteopenia. Multilevel spondylosis. There are several small to moderate-sized fat-containing ventral hernias, unchanged. Edema/stranding in the subcutaneous soft tissues of the abdomen, greatest in the right lower abdomen,   unchanged.                                                                      IMPRESSION:   1.  Nonobstructing nephrolithiasis.  2.  Decreasing size of pelvic pericolonic fluid collection.  3.  New left lower lobe airspace disease compatible with pneumonia or aspiration. Clinical  correlation and follow-up advised.  4.  Increasing right pleural effusion.     Liver transplant in 2002 2/2 primary biliary cirrhosis  Comment: Chronic. Follows Dr. Ramirez. There was additional fax orders at TCU today from Dr Ramirez transplant team given pending surgery of needing to come off sirolimus--therefore start tacrolimus 0.5mg BID and recheck labs in 3 days. Dr ramirez office will update patient when to officially stop the sirolimus. Please note per PCC notification with med-med interactions which I advised staff to update transplant as well with concerns: Coadministration of Tacrolimus Oral Capsule 0.5 MG and Sirolimus Oral Tablet 1 MG in combination with corticosteroids has been associated with fatal cases of bronchial anastomotic dehiscence in lung transplant patients. Additionally, trough concentrations of Tacrolimus Oral Capsule 0.5 MG may be decreased by Sirolimus Oral Tablet 1 MG resulting in a reduced pharmacologic effect of Tacrolimus Oral Capsule 0.5 MG.  Plan:  -Continue PTA sirolimus 1 mg every day as directed for now until directed to stop per transplant team  -Start tacrolimus 0.5mg BID for now per team.   -Continue prednisone 9mg daily for now. (5mg with 4 tablets of 1mg=9mg total)  -Continue PTA ursodiol 250 mg BID  -Follow up with transplant team as directed. Scheduled for 5/28/25  -CMP, CBC with diff and CRP due weekly on Mondays as directed with no end date at this time. Fax results to [phone] ATTN Sindhu Del Rio  -BMP, CBC with diff and platelets, hepatic panel. And tacrolimus trough level due 5/30 around 0800/0900 per transplant team. Fax results to team at [phone] or urgent results at [phone]     Depression   Comment: Chronic. Stable today  Plan:  -Monitor mood and behaviors  -Monitor for worsening s/symptoms of concerns  -Monitor for changes in mobility, eating and sleeping patterns  -Continue zoloft 50mg daily.   -Follow up with psych post TCU  -CMP, CBC with diff and CRP  due weekly on Mondays as directed with no end date at this time. Fax results to [phone] ATTDENNIS Sindhu Coffeytana     Mild Obstructive Sleep Apnea   Nocturnal Hypoxemia   Pleural effusions  SOB  Comment: Chronic. Sleep study in 2018 showing mild RASHIDA with recommendation to start CPAP. It does not appear that there was further follow up and not using CPAP. See results of previous CT scan as indicated above. Today- She denies any chest pain, palpitations, lightheadedness, dizziness. Some reports of shortness of breath especially with activity with now a reported productive cough of greenish phlegm reported. Lungs clear on assessment today.   Plan:  -Monitor sleeping patterns  -On 5/23/25 noted on CT results of pleural effusions along with suspected pneumonia therefore previous provider ordered to start furosemide 20mg daily x 4 days with repeat CXR on 5/27--results pending at this time.   -start albuterol inhaler every 4 hours PRN. May HUNTER  -Monitor respiratory status  -PCP to consider sleep medicine referral  -CMP, CBC with diff and CRP due weekly on Mondays as directed with no end date at this time. Fax results to [phone] ATTDENNIS Sindhu Coffeytana     CKD3b w/ moderate proteinuria, at baseline  HTN  CVD/HLD  paroxysmal atrial fibrillation  Comment: Chronic. Baseline Cr. 1.5. History of dialysis at time of liver transplant 23 years ago. Recurrent AKIs and immunosuppressive medication toxicity. Not on SGLT2i 2/2 recurrent UTI. Chart review w/ unclear heart failure history, unconfirmed with EF 60-65% on 3/10/25. BNP 1446 elevated from 1 month ago. Given unclear HF history, trace bilateral LE edema, with some pulm edema noted on imaging, consideration for volume overload. However, without orthopnea or clear cough.   Plan:  -Continue amlodipine 5mg daily. HOLD if SBP<100  -Continue PTA hydralazine 50mg TID. Hold for SBP <100mmHg.   -Continue PTA metoprolol succinate 25mg every day. HOLD if SBP<100 and/or  HR<55  -Monitor BP and HR  -HOLD PTA evolocumab q2week. Follow up with PCP post TCU to resume. Resume off while on ANTIBIOTIC course  -Continue PTA eztimibe 10mg every day  -Continue apixaban 2.5mg BID  -CMP, CBC with diff and CRP due weekly on Mondays as directed with no end date at this time. Fax results to [phone] AUSTYN Del Rio     T2DM  (A1c 6.8% 4/14/25)  Comment: Chronic. Last A1c 6.8% on 4/14/25.   Plan:  -Continue PTA linagliptin 5mg every day  -Continue PTA gabapentin 300mg at bedtime  -Blood Glucose monitoring back to previous schedule of BID. HOLD off on using freestyle phil 2 sensor while on TCU  -CMP, CBC with diff and CRP due weekly on Mondays as directed with no end date at this time. 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 Wound does not look infected. She now report having skin lesion to left anterior forearm. 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 Will defer to dermatology  Plan:  -Monitor for worsening s/symptoms of concerns  -Follow up with dermatology regarding mass noted to left anterior forearm. Dermatology requested for PET scan which is scheduled for 5/29/25     anemia, chronic  Comment: Chronic. Hgb 9.9 on admit. Stable.  Plan:  -Monitor bleeding risks  -Continue PTA ferrous sulfate daily  -Continue folic acid daily  -CMP, CBC with diff and CRP due weekly on Mondays as directed with no end date at this time. Fax results to [phone] AUSTYN Del Rio     Hypothyroidism  Papillary thyroid carcinoma  Chronic. Recent TSH~1.72 on 4/4/25  Plan;  -Continue PTA levothyroxine 175mcg qD   -Monitor for worsening s/symptoms of concerns  -CMP, CBC with diff and CRP due weekly on Mondays as directed with no end date at this time. Fax results to [phone] AUSTYN Del Rio      Chronic Shoulder Pain  Osteoarthritis   Lower back pain  Comment: Chronic. stable  Plan:  -Monitor pain complaints  -Continue diclofenac gel application to painful areas QID  -Continue Tylenol 1000mg BID   -Continue gabapentin 300mg at HS--only able to tolerate liquid given gelatin capsule allergy  -CMP, CBC with diff and CRP due weekly on  as directed with no end date at this time. Fax results to [phone] ATTN Sindhu Coffeytana     Constipation   Nausea  GERD  Comment: Chronic. S/T polypharmacy. She occasionally reports abdominal discomfort with nausea complaints. Stable reports today  Plan:  -Monitor BM patterns  -Continue TUMS 1000mg every 4 hours PRN  -Continue beneFiber qd    -Zofran PRN     Epistaxis  Comment: Acute on chronic. She reports occasional nose bleed at times. No episodes while on TCU.   Plan:  -Monitor bleeding risks  -Monitor for worsening s/symptoms of concerns  -Afrin BID PRN on TCU if warranted     Vaginal bleeding  Comment: Acute on chronic. Now having 4-5 episodes of painless vaginal bleeding noted since TCU admission. Last menstrual period she reports has been 40 years ago or so. Known family history of endometrial cancer that metastasized to bowel per her reports. She reports her mother had extensive genetic testing for review in EPIC. Reports mother name: Anne Yap  3/9/23 for reference if indicated. Noted on :She reports ongoing worsening vaginal bleeding for several days. Eliquis was placed on hold again due to these symptoms last night. Reports no vaginal bleeding noted yet this morning. Still denies no pain with this vaginal bleeding when it occurs. No lower abdomen cramping or complaints.   Plan:  -Continue apixaban 2.5mg BID  -Monitor for worsening s/symptoms of concerns  -Per OBGYN noted on 25 recommended a pelvic ultrasound including transvaginal ultrasound to evaluate the uterus and ovaries. Scheduled for 25  -Per OBGYN on 25:  If the  endometrial stripe is greater than 4 mm or if it is less than 4 mm and she continues to have bleeding, she will need tissue evaluation with either office endometrial biopsy or hysteroscopy with D&C in the operating room.  She does not believe she can tolerate an endometrial biopsy in the clinic, so we discussed doing this as an outpatient procedure. Given the complexity of her health history and recent history, This would likely be best accomplished at the [facility] where her transplant team resides.  She has an appointment there in addition with colorectal surgery upcoming.  If they are planning surgical intervention, hopefully this could be combined with that, but she will need to see one of my colleagues at the [facility] to arrange the D&C portion of any planned procedure.    -CMP, CBC with diff and CRP due weekly on Mondays as directed with no end date at this time. Fax results to [phone] ATTN Sindhu Del Rio     Physical deconditioning  Generalized muscle weakness  History of falls  Comment: Acute on chronic. Known poor history PTA. [facility] given concerns. Per therapy-Ambulates up to 200ft with upwalker. SBA for most needs and LB cares. 26/30 on the SLUMS, just below normal indicating MNCD  Plan:  -Continue Physical therapy and Occupational therapy  -Recommend cognitive testing on site  -Highly recommend MCFP compliance post TCU    Thrush  Unspecified oral lesions of mouth  Comment: Acute. Staff reported painful white patches in mouth that were affecting her oral intake. Reports approximately 3 days ago. Now with reports of tingling around mouth despite benadryl use. Potential allergy risk from IV tigecycline?  Plan:  -Continue nystatin as directed QID as directed  -Stop IV tigecycline as directed above for now  -Monitor oral intake  -Monitor for worsening s/x of concerns     60 minutes spent on the date of the encounter doing  chart review, history and exam, PMH, documentation and further activities as noted above. Collaboration with nursing staff on site, clinical managers, and therapies were completed on site today.     Electronically signed by:  Dr. Manda Flor DNP, APRN, FNP-C, WCS-C, EDS-C     Provider reviewed records from facility, and interpreted most recent imaging/lab work, and vital signs.   Acute and chronic conditions managed by writer. Have been reviewed during today's exam         Sincerely,        Manda Flor, LIZ CNP    Electronically signed

## 2025-05-27 NOTE — TELEPHONE ENCOUNTER
Called patient regarding scheduling surgery with Dr. Campbell. Patient states she is in the middle of changing her transplant medication, which was today. Reports that prior to her last surgery for her thumb, it took two weeks to make the medication switch. Offered surgery date of 6/11/2025, patient believes this would be too soon. Informed patient that writer would discuss with the team and contact her back.    Dianne Thompson on 5/27/2025 at 3:42 PM

## 2025-05-27 NOTE — LETTER
PHYSICIAN ORDERS  [facility]  Fax#[phone]      DATE & TIME ISSUED: May 27, 2025 3:28 PM  PATIENT NAME: Luz Thompson   : 1950         Pt having surgery. Reviewed with dr Ramirez.       Pt to start tacro and once level therapetuic ok to stop sirolimus. Our team will let staff     know when to stop sirolimus     Pt to start tacro 0.5 mg BID and repeat all transplant labs in 3 days.       .

## 2025-05-27 NOTE — LETTER
OUTPATIENT LABORATORY TEST ORDER   [facility]  Fax#[phone]    Patient Name: Luz Thompson   YOB: 1950     [facility] MR# [if applicable]: [id number]   Date & Time: May 27, 2025  3:32 PM  Expiration Date: 1 year after date issued       Diagnosis: Liver Transplant (ICD-10 Z94.4)   Aftercare following organ transplant (ICD-10 Z48.288)   Long term use of medications (ICD-10 Z79.899)      We ask your assistance in obtaining the following laboratory tests, which are part of our routine surveillance program for Solid Organ Transplant patients.     Please fax each result to [phone], same day as resulted/available    Critical lab results page [phone]      In 3 days from starting tacrolimus, then as directed by transplant staff  CBC with Platelets   Basic Metabolic Panel   Hepatic panel   Tacrolimus drug level - trough level, please document time of last dose           If you have any questions, please call [facility]- [phone] or [phone], Fax- [phone].    .

## 2025-05-27 NOTE — NURSING NOTE
"Chief Complaint   Patient presents with    Consult     diverticulitis       Vitals:    05/27/25 0948   BP: 97/50   BP Location: Left arm   Patient Position: Sitting   Cuff Size: Adult Regular   Pulse: 61   Resp: 14   SpO2: 99%   Weight: 180 lb   Height: 5' 7\"       Body mass index is 28.19 kg/m .    Chloe Ngo, EMT  "

## 2025-05-28 ENCOUNTER — APPOINTMENT (OUTPATIENT)
Dept: ULTRASOUND IMAGING | Facility: CLINIC | Age: 75
DRG: 329 | End: 2025-05-28
Attending: PHYSICIAN ASSISTANT
Payer: MEDICARE

## 2025-05-28 ENCOUNTER — APPOINTMENT (OUTPATIENT)
Dept: GENERAL RADIOLOGY | Facility: CLINIC | Age: 75
DRG: 329 | End: 2025-05-28
Attending: PHYSICIAN ASSISTANT
Payer: MEDICARE

## 2025-05-28 ENCOUNTER — HOSPITAL ENCOUNTER (INPATIENT)
Facility: CLINIC | Age: 75
Discharge: HOME | End: 2025-05-28
Attending: FAMILY MEDICINE | Admitting: SURGERY
Payer: MEDICARE

## 2025-05-28 ENCOUNTER — PREP FOR PROCEDURE (OUTPATIENT)
Dept: UROLOGY | Facility: CLINIC | Age: 75
End: 2025-05-28

## 2025-05-28 ENCOUNTER — APPOINTMENT (OUTPATIENT)
Dept: CT IMAGING | Facility: CLINIC | Age: 75
DRG: 329 | End: 2025-05-28
Attending: FAMILY MEDICINE
Payer: MEDICARE

## 2025-05-28 ENCOUNTER — HOSPITAL ENCOUNTER (INPATIENT)
Facility: CLINIC | Age: 75
Setting detail: SURGERY ADMIT
End: 2025-05-28
Attending: SURGERY | Admitting: SURGERY
Payer: MEDICARE

## 2025-05-28 DIAGNOSIS — K57.32 DIVERTICULITIS OF COLON: ICD-10-CM

## 2025-05-28 DIAGNOSIS — R53.1 GENERALIZED WEAKNESS: ICD-10-CM

## 2025-05-28 DIAGNOSIS — N95.0 POST-MENOPAUSAL BLEEDING: ICD-10-CM

## 2025-05-28 DIAGNOSIS — Z71.89 OSTOMY NURSE CONSULTATION: ICD-10-CM

## 2025-05-28 DIAGNOSIS — Z71.89 OSTOMY NURSE CONSULTATION: Primary | ICD-10-CM

## 2025-05-28 DIAGNOSIS — Z94.4 H/O LIVER TRANSPLANT (H): ICD-10-CM

## 2025-05-28 DIAGNOSIS — D84.9 IMMUNOSUPPRESSED STATUS: ICD-10-CM

## 2025-05-28 DIAGNOSIS — R19.7 DIARRHEA OF PRESUMED INFECTIOUS ORIGIN: ICD-10-CM

## 2025-05-28 DIAGNOSIS — J18.9 PNEUMONIA DUE TO INFECTIOUS ORGANISM, UNSPECIFIED LATERALITY, UNSPECIFIED PART OF LUNG: ICD-10-CM

## 2025-05-28 DIAGNOSIS — I10 BENIGN ESSENTIAL HYPERTENSION: ICD-10-CM

## 2025-05-28 DIAGNOSIS — Z94.4 STATUS POST LIVER TRANSPLANTATION (H): ICD-10-CM

## 2025-05-28 DIAGNOSIS — K57.20 DIVERTICULITIS OF LARGE INTESTINE WITH ABSCESS WITHOUT BLEEDING: ICD-10-CM

## 2025-05-28 LAB
ADV 40+41 DNA STL QL NAA+NON-PROBE: NEGATIVE
ALBUMIN SERPL BCG-MCNC: 3.2 G/DL (ref 3.5–5.2)
ALBUMIN UR-MCNC: NEGATIVE MG/DL
ALP SERPL-CCNC: 160 U/L (ref 40–150)
ALT SERPL W P-5'-P-CCNC: 27 U/L (ref 0–50)
ANION GAP SERPL CALCULATED.3IONS-SCNC: 16 MMOL/L (ref 7–15)
ANION GAP SERPL CALCULATED.3IONS-SCNC: 18 MMOL/L (ref 7–15)
APPEARANCE UR: ABNORMAL
AST SERPL W P-5'-P-CCNC: 23 U/L (ref 0–45)
ASTRO TYP 1-8 RNA STL QL NAA+NON-PROBE: NEGATIVE
BACTERIA #/AREA URNS HPF: ABNORMAL /HPF
BASOPHILS # BLD AUTO: 0 10E3/UL (ref 0–0.2)
BASOPHILS NFR BLD AUTO: 0 %
BILIRUB SERPL-MCNC: 0.4 MG/DL
BILIRUB UR QL STRIP: NEGATIVE
BUN SERPL-MCNC: 107.1 MG/DL (ref 8–23)
BUN SERPL-MCNC: 117.4 MG/DL (ref 8–23)
C CAYETANENSIS DNA STL QL NAA+NON-PROBE: NEGATIVE
C DIFF TOX B STL QL: NEGATIVE
CALCIUM SERPL-MCNC: 7.3 MG/DL (ref 8.8–10.4)
CALCIUM SERPL-MCNC: 7.3 MG/DL (ref 8.8–10.4)
CAMPYLOBACTER DNA SPEC NAA+PROBE: NEGATIVE
CHLORIDE SERPL-SCNC: 101 MMOL/L (ref 98–107)
CHLORIDE SERPL-SCNC: 97 MMOL/L (ref 98–107)
COLOR UR AUTO: ABNORMAL
CREAT SERPL-MCNC: 2.67 MG/DL (ref 0.51–0.95)
CREAT SERPL-MCNC: 2.99 MG/DL (ref 0.51–0.95)
CRYPTOSP DNA STL QL NAA+NON-PROBE: NEGATIVE
E COLI O157 DNA STL QL NAA+NON-PROBE: NORMAL
E HISTOLYT DNA STL QL NAA+NON-PROBE: NEGATIVE
EAEC ASTA GENE ISLT QL NAA+PROBE: NEGATIVE
EC STX1+STX2 GENES STL QL NAA+NON-PROBE: NEGATIVE
EGFRCR SERPLBLD CKD-EPI 2021: 16 ML/MIN/1.73M2
EGFRCR SERPLBLD CKD-EPI 2021: 18 ML/MIN/1.73M2
EOSINOPHIL # BLD AUTO: 0 10E3/UL (ref 0–0.7)
EOSINOPHIL NFR BLD AUTO: 0 %
EPEC EAE GENE STL QL NAA+NON-PROBE: NEGATIVE
ERYTHROCYTE [DISTWIDTH] IN BLOOD BY AUTOMATED COUNT: 18 % (ref 10–15)
ETEC LTA+ST1A+ST1B TOX ST NAA+NON-PROBE: NEGATIVE
G LAMBLIA DNA STL QL NAA+NON-PROBE: NEGATIVE
GLUCOSE BLDC GLUCOMTR-MCNC: 106 MG/DL (ref 70–99)
GLUCOSE BLDC GLUCOMTR-MCNC: 249 MG/DL (ref 70–99)
GLUCOSE SERPL-MCNC: 112 MG/DL (ref 70–99)
GLUCOSE SERPL-MCNC: 244 MG/DL (ref 70–99)
GLUCOSE UR STRIP-MCNC: NEGATIVE MG/DL
HCO3 SERPL-SCNC: 21 MMOL/L (ref 22–29)
HCO3 SERPL-SCNC: 21 MMOL/L (ref 22–29)
HCT VFR BLD AUTO: 30.8 % (ref 35–47)
HGB BLD-MCNC: 9.9 G/DL (ref 11.7–15.7)
HGB UR QL STRIP: NEGATIVE
HYALINE CASTS: 3 /LPF
IMM GRANULOCYTES # BLD: 0.1 10E3/UL
IMM GRANULOCYTES NFR BLD: 1 %
KETONES UR STRIP-MCNC: NEGATIVE MG/DL
LACTATE SERPL-SCNC: 2.4 MMOL/L (ref 0.7–2)
LACTATE SERPL-SCNC: 2.5 MMOL/L (ref 0.7–2)
LACTATE SERPL-SCNC: 3.5 MMOL/L (ref 0.7–2)
LEUKOCYTE ESTERASE UR QL STRIP: ABNORMAL
LYMPHOCYTES # BLD AUTO: 0.3 10E3/UL (ref 0.8–5.3)
LYMPHOCYTES NFR BLD AUTO: 3 %
MAGNESIUM SERPL-MCNC: 2.1 MG/DL (ref 1.7–2.3)
MCH RBC QN AUTO: 27.5 PG (ref 26.5–33)
MCHC RBC AUTO-ENTMCNC: 32.1 G/DL (ref 31.5–36.5)
MCV RBC AUTO: 86 FL (ref 78–100)
MONOCYTES # BLD AUTO: 0.2 10E3/UL (ref 0–1.3)
MONOCYTES NFR BLD AUTO: 2 %
MUCOUS THREADS #/AREA URNS LPF: PRESENT /LPF
NEUTROPHILS # BLD AUTO: 8.8 10E3/UL (ref 1.6–8.3)
NEUTROPHILS NFR BLD AUTO: 94 %
NITRATE UR QL: NEGATIVE
NOROVIRUS GI+II RNA STL QL NAA+NON-PROBE: NEGATIVE
NRBC # BLD AUTO: 0 10E3/UL
NRBC BLD AUTO-RTO: 0 /100
P SHIGELLOIDES DNA STL QL NAA+NON-PROBE: NEGATIVE
PH UR STRIP: 5.5 [PH] (ref 5–7)
PHOSPHATE SERPL-MCNC: 4.9 MG/DL (ref 2.5–4.5)
PLATELET # BLD AUTO: 291 10E3/UL (ref 150–450)
POTASSIUM SERPL-SCNC: 4 MMOL/L (ref 3.4–5.3)
POTASSIUM SERPL-SCNC: 4.3 MMOL/L (ref 3.4–5.3)
PROT SERPL-MCNC: 5.8 G/DL (ref 6.4–8.3)
RBC # BLD AUTO: 3.6 10E6/UL (ref 3.8–5.2)
RBC URINE: 2 /HPF
RVA RNA STL QL NAA+NON-PROBE: NEGATIVE
SALMONELLA SP RPOD STL QL NAA+PROBE: NEGATIVE
SAPO I+II+IV+V RNA STL QL NAA+NON-PROBE: NEGATIVE
SHIGELLA SP+EIEC IPAH ST NAA+NON-PROBE: NEGATIVE
SODIUM SERPL-SCNC: 136 MMOL/L (ref 135–145)
SODIUM SERPL-SCNC: 138 MMOL/L (ref 135–145)
SP GR UR STRIP: 1.01 (ref 1–1.03)
SQUAMOUS EPITHELIAL: 4 /HPF
UROBILINOGEN UR STRIP-MCNC: NORMAL MG/DL
V CHOLERAE DNA SPEC QL NAA+PROBE: NEGATIVE
VIBRIO DNA SPEC NAA+PROBE: NEGATIVE
WBC # BLD AUTO: 9.3 10E3/UL (ref 4–11)
WBC CLUMPS #/AREA URNS HPF: PRESENT /HPF
WBC URINE: 79 /HPF
Y ENTEROCOL DNA STL QL NAA+PROBE: NEGATIVE
YEAST #/AREA URNS HPF: ABNORMAL /HPF

## 2025-05-28 PROCEDURE — 99222 1ST HOSP IP/OBS MODERATE 55: CPT | Performed by: UROLOGY

## 2025-05-28 PROCEDURE — 83605 ASSAY OF LACTIC ACID: CPT | Performed by: PHYSICIAN ASSISTANT

## 2025-05-28 PROCEDURE — 83605 ASSAY OF LACTIC ACID: CPT | Performed by: FAMILY MEDICINE

## 2025-05-28 PROCEDURE — 99285 EMERGENCY DEPT VISIT HI MDM: CPT | Performed by: FAMILY MEDICINE

## 2025-05-28 PROCEDURE — 82310 ASSAY OF CALCIUM: CPT | Performed by: PHYSICIAN ASSISTANT

## 2025-05-28 PROCEDURE — 87507 IADNA-DNA/RNA PROBE TQ 12-25: CPT | Performed by: FAMILY MEDICINE

## 2025-05-28 PROCEDURE — 85004 AUTOMATED DIFF WBC COUNT: CPT | Performed by: FAMILY MEDICINE

## 2025-05-28 PROCEDURE — 99285 EMERGENCY DEPT VISIT HI MDM: CPT | Mod: 25 | Performed by: FAMILY MEDICINE

## 2025-05-28 PROCEDURE — 87154 CUL TYP ID BLD PTHGN 6+ TRGT: CPT | Performed by: FAMILY MEDICINE

## 2025-05-28 PROCEDURE — 76857 US EXAM PELVIC LIMITED: CPT

## 2025-05-28 PROCEDURE — 200N000002 HC R&B ICU UMMC

## 2025-05-28 PROCEDURE — 99223 1ST HOSP IP/OBS HIGH 75: CPT | Performed by: INTERNAL MEDICINE

## 2025-05-28 PROCEDURE — 36415 COLL VENOUS BLD VENIPUNCTURE: CPT | Performed by: PHYSICIAN ASSISTANT

## 2025-05-28 PROCEDURE — 83735 ASSAY OF MAGNESIUM: CPT | Performed by: PHYSICIAN ASSISTANT

## 2025-05-28 PROCEDURE — 71046 X-RAY EXAM CHEST 2 VIEWS: CPT

## 2025-05-28 PROCEDURE — 81001 URINALYSIS AUTO W/SCOPE: CPT | Performed by: FAMILY MEDICINE

## 2025-05-28 PROCEDURE — 82962 GLUCOSE BLOOD TEST: CPT

## 2025-05-28 PROCEDURE — 250N000013 HC RX MED GY IP 250 OP 250 PS 637: Performed by: FAMILY MEDICINE

## 2025-05-28 PROCEDURE — 84100 ASSAY OF PHOSPHORUS: CPT | Performed by: PHYSICIAN ASSISTANT

## 2025-05-28 PROCEDURE — 87086 URINE CULTURE/COLONY COUNT: CPT | Performed by: FAMILY MEDICINE

## 2025-05-28 PROCEDURE — 250N000013 HC RX MED GY IP 250 OP 250 PS 637: Performed by: PHYSICIAN ASSISTANT

## 2025-05-28 PROCEDURE — 96375 TX/PRO/DX INJ NEW DRUG ADDON: CPT | Performed by: FAMILY MEDICINE

## 2025-05-28 PROCEDURE — 99207 PR APP CREDIT; MD BILLING SHARED VISIT: CPT | Performed by: PHYSICIAN ASSISTANT

## 2025-05-28 PROCEDURE — 258N000003 HC RX IP 258 OP 636: Performed by: PHYSICIAN ASSISTANT

## 2025-05-28 PROCEDURE — 36415 COLL VENOUS BLD VENIPUNCTURE: CPT | Performed by: FAMILY MEDICINE

## 2025-05-28 PROCEDURE — 250N000011 HC RX IP 250 OP 636: Performed by: FAMILY MEDICINE

## 2025-05-28 PROCEDURE — 96374 THER/PROPH/DIAG INJ IV PUSH: CPT | Performed by: FAMILY MEDICINE

## 2025-05-28 PROCEDURE — 74176 CT ABD & PELVIS W/O CONTRAST: CPT

## 2025-05-28 PROCEDURE — 87040 BLOOD CULTURE FOR BACTERIA: CPT | Performed by: FAMILY MEDICINE

## 2025-05-28 PROCEDURE — 258N000003 HC RX IP 258 OP 636: Performed by: FAMILY MEDICINE

## 2025-05-28 PROCEDURE — 80053 COMPREHEN METABOLIC PANEL: CPT | Performed by: FAMILY MEDICINE

## 2025-05-28 PROCEDURE — 87493 C DIFF AMPLIFIED PROBE: CPT | Performed by: FAMILY MEDICINE

## 2025-05-28 RX ORDER — DIPHENHYDRAMINE HYDROCHLORIDE 50 MG/ML
12.5 INJECTION, SOLUTION INTRAMUSCULAR; INTRAVENOUS ONCE
Status: COMPLETED | OUTPATIENT
Start: 2025-05-28 | End: 2025-05-28

## 2025-05-28 RX ORDER — METRONIDAZOLE 500 MG/100ML
500 INJECTION, SOLUTION INTRAVENOUS
Status: CANCELLED | OUTPATIENT
Start: 2025-05-28

## 2025-05-28 RX ORDER — METOPROLOL SUCCINATE 25 MG/1
25 TABLET, EXTENDED RELEASE ORAL DAILY
Status: DISCONTINUED | OUTPATIENT
Start: 2025-05-29 | End: 2025-05-30

## 2025-05-28 RX ORDER — ONDANSETRON 2 MG/ML
4 INJECTION INTRAMUSCULAR; INTRAVENOUS EVERY 30 MIN PRN
Status: DISCONTINUED | OUTPATIENT
Start: 2025-05-28 | End: 2025-05-28

## 2025-05-28 RX ORDER — ACETAMINOPHEN 325 MG/1
975 TABLET ORAL ONCE
Status: CANCELLED | OUTPATIENT
Start: 2025-05-28 | End: 2025-05-28

## 2025-05-28 RX ORDER — EZETIMIBE 10 MG/1
10 TABLET ORAL AT BEDTIME
Status: DISCONTINUED | OUTPATIENT
Start: 2025-05-28 | End: 2025-06-12 | Stop reason: HOSPADM

## 2025-05-28 RX ORDER — ONDANSETRON 2 MG/ML
4 INJECTION INTRAMUSCULAR; INTRAVENOUS ONCE
Status: CANCELLED | OUTPATIENT
Start: 2025-05-28 | End: 2025-05-28

## 2025-05-28 RX ORDER — CALCITRIOL 0.25 UG/1
0.25 CAPSULE, LIQUID FILLED ORAL DAILY
Status: DISCONTINUED | OUTPATIENT
Start: 2025-05-29 | End: 2025-06-12 | Stop reason: HOSPADM

## 2025-05-28 RX ORDER — ESTRADIOL 0.1 MG/G
2 CREAM VAGINAL
Status: DISCONTINUED | OUTPATIENT
Start: 2025-05-30 | End: 2025-06-12 | Stop reason: HOSPADM

## 2025-05-28 RX ORDER — POLYETHYLENE GLYCOL 3350 17 G/17G
17 POWDER, FOR SOLUTION ORAL ONCE
Status: COMPLETED | OUTPATIENT
Start: 2025-05-28 | End: 2025-05-28

## 2025-05-28 RX ORDER — ALBUTEROL SULFATE 0.83 MG/ML
2.5 SOLUTION RESPIRATORY (INHALATION) EVERY 4 HOURS PRN
Status: DISCONTINUED | OUTPATIENT
Start: 2025-05-28 | End: 2025-06-12 | Stop reason: HOSPADM

## 2025-05-28 RX ORDER — GABAPENTIN 250 MG/5ML
300 SOLUTION ORAL AT BEDTIME
Status: DISCONTINUED | OUTPATIENT
Start: 2025-05-28 | End: 2025-06-12 | Stop reason: HOSPADM

## 2025-05-28 RX ORDER — URSODIOL 250 MG/1
250 TABLET, FILM COATED ORAL 2 TIMES DAILY
Status: DISCONTINUED | OUTPATIENT
Start: 2025-05-28 | End: 2025-06-12 | Stop reason: HOSPADM

## 2025-05-28 RX ORDER — CEFAZOLIN SODIUM 2 G/50ML
2 SOLUTION INTRAVENOUS
Status: CANCELLED | OUTPATIENT
Start: 2025-05-28

## 2025-05-28 RX ORDER — NICOTINE POLACRILEX 4 MG
15-30 LOZENGE BUCCAL
Status: DISCONTINUED | OUTPATIENT
Start: 2025-05-28 | End: 2025-05-31

## 2025-05-28 RX ORDER — FERROUS SULFATE 325(65) MG
325 TABLET ORAL AT BEDTIME
Status: DISCONTINUED | OUTPATIENT
Start: 2025-05-28 | End: 2025-06-12 | Stop reason: HOSPADM

## 2025-05-28 RX ORDER — FOLIC ACID 1 MG/1
1000 TABLET ORAL AT BEDTIME
Status: DISCONTINUED | OUTPATIENT
Start: 2025-05-28 | End: 2025-06-12 | Stop reason: HOSPADM

## 2025-05-28 RX ORDER — ONDANSETRON 2 MG/ML
4 INJECTION INTRAMUSCULAR; INTRAVENOUS EVERY 6 HOURS PRN
Status: DISCONTINUED | OUTPATIENT
Start: 2025-05-28 | End: 2025-06-12 | Stop reason: HOSPADM

## 2025-05-28 RX ORDER — NYSTATIN 100000 [USP'U]/ML
1000000 SUSPENSION ORAL 4 TIMES DAILY
Status: DISCONTINUED | OUTPATIENT
Start: 2025-05-28 | End: 2025-06-12 | Stop reason: HOSPADM

## 2025-05-28 RX ORDER — CEFAZOLIN SODIUM 2 G/50ML
2 SOLUTION INTRAVENOUS SEE ADMIN INSTRUCTIONS
Status: CANCELLED | OUTPATIENT
Start: 2025-05-28

## 2025-05-28 RX ORDER — NYSTATIN 100000 [USP'U]/ML
1000000 SUSPENSION ORAL 4 TIMES DAILY
Status: ON HOLD | COMMUNITY
End: 2025-06-12

## 2025-05-28 RX ORDER — AMLODIPINE BESYLATE 5 MG/1
5 TABLET ORAL DAILY
Status: DISCONTINUED | OUTPATIENT
Start: 2025-05-29 | End: 2025-06-12 | Stop reason: HOSPADM

## 2025-05-28 RX ORDER — LIDOCAINE 40 MG/G
CREAM TOPICAL
Status: DISCONTINUED | OUTPATIENT
Start: 2025-05-28 | End: 2025-06-12 | Stop reason: HOSPADM

## 2025-05-28 RX ORDER — TACROLIMUS 0.5 MG/1
0.5 CAPSULE ORAL
Status: DISCONTINUED | OUTPATIENT
Start: 2025-05-28 | End: 2025-06-04

## 2025-05-28 RX ORDER — CALCIUM CARBONATE 500 MG/1
1000 TABLET, CHEWABLE ORAL EVERY 4 HOURS PRN
Status: DISCONTINUED | OUTPATIENT
Start: 2025-05-28 | End: 2025-06-12 | Stop reason: HOSPADM

## 2025-05-28 RX ORDER — DEXTROSE MONOHYDRATE 25 G/50ML
25-50 INJECTION, SOLUTION INTRAVENOUS
Status: DISCONTINUED | OUTPATIENT
Start: 2025-05-28 | End: 2025-05-31

## 2025-05-28 RX ORDER — HYDRALAZINE HYDROCHLORIDE 25 MG/1
50 TABLET, FILM COATED ORAL 3 TIMES DAILY
Status: DISCONTINUED | OUTPATIENT
Start: 2025-05-28 | End: 2025-06-12 | Stop reason: HOSPADM

## 2025-05-28 RX ORDER — ERTAPENEM 1 G/1
1 INJECTION, POWDER, LYOPHILIZED, FOR SOLUTION INTRAMUSCULAR; INTRAVENOUS EVERY 24 HOURS
Status: DISCONTINUED | OUTPATIENT
Start: 2025-05-28 | End: 2025-05-28 | Stop reason: DRUGHIGH

## 2025-05-28 RX ORDER — ACETAMINOPHEN 325 MG/1
975 TABLET ORAL 2 TIMES DAILY
Status: DISCONTINUED | OUTPATIENT
Start: 2025-05-28 | End: 2025-06-10

## 2025-05-28 RX ORDER — DIPHENHYDRAMINE HCL 25 MG
25 CAPSULE ORAL EVERY 6 HOURS PRN
Status: DISCONTINUED | OUTPATIENT
Start: 2025-05-28 | End: 2025-05-30

## 2025-05-28 RX ORDER — HYDROMORPHONE HCL IN WATER/PF 6 MG/30 ML
0.2 PATIENT CONTROLLED ANALGESIA SYRINGE INTRAVENOUS ONCE
Refills: 0 | Status: COMPLETED | OUTPATIENT
Start: 2025-05-28 | End: 2025-05-28

## 2025-05-28 RX ORDER — GUAR GUM
1 PACKET (EA) ORAL DAILY
Status: DISCONTINUED | OUTPATIENT
Start: 2025-05-30 | End: 2025-06-12 | Stop reason: HOSPADM

## 2025-05-28 RX ORDER — SODIUM CHLORIDE, SODIUM LACTATE, POTASSIUM CHLORIDE, CALCIUM CHLORIDE 600; 310; 30; 20 MG/100ML; MG/100ML; MG/100ML; MG/100ML
INJECTION, SOLUTION INTRAVENOUS CONTINUOUS
Status: DISCONTINUED | OUTPATIENT
Start: 2025-05-28 | End: 2025-05-30

## 2025-05-28 RX ADMIN — SODIUM CHLORIDE 1000 ML: 0.9 INJECTION, SOLUTION INTRAVENOUS at 14:04

## 2025-05-28 RX ADMIN — ONDANSETRON 4 MG: 2 INJECTION INTRAMUSCULAR; INTRAVENOUS at 13:44

## 2025-05-28 RX ADMIN — POLYETHYLENE GLYCOL 3350 17 G: 17 POWDER, FOR SOLUTION ORAL at 17:59

## 2025-05-28 RX ADMIN — Medication 1000 MCG: at 22:53

## 2025-05-28 RX ADMIN — HYDROMORPHONE HYDROCHLORIDE 0.2 MG: 0.2 INJECTION, SOLUTION INTRAMUSCULAR; INTRAVENOUS; SUBCUTANEOUS at 13:45

## 2025-05-28 RX ADMIN — DIPHENHYDRAMINE HYDROCHLORIDE 12.5 MG: 50 INJECTION, SOLUTION INTRAMUSCULAR; INTRAVENOUS at 14:00

## 2025-05-28 RX ADMIN — SODIUM CHLORIDE, SODIUM LACTATE, POTASSIUM CHLORIDE, AND CALCIUM CHLORIDE: .6; .31; .03; .02 INJECTION, SOLUTION INTRAVENOUS at 16:42

## 2025-05-28 RX ADMIN — ACETAMINOPHEN 975 MG: 325 TABLET, FILM COATED ORAL at 22:53

## 2025-05-28 RX ADMIN — Medication 1 MG: at 22:53

## 2025-05-28 RX ADMIN — ERTAPENEM SODIUM 500 MG: 1 INJECTION, POWDER, LYOPHILIZED, FOR SOLUTION INTRAMUSCULAR; INTRAVENOUS at 18:01

## 2025-05-28 ASSESSMENT — ACTIVITIES OF DAILY LIVING (ADL)
ADLS_ACUITY_SCORE: 57

## 2025-05-28 NOTE — TELEPHONE ENCOUNTER
Spoke with Tamara at facility. She will contact pharmacy to ensure they deliver.     Disucssed importance of transition off sirolimus.

## 2025-05-28 NOTE — PROGRESS NOTES
Brief hepatology note    Received a question about  immunosuppression prior to surgery    There was a plan to transition her sirolimus (mTOR) to tacrolimus in anticipation of this surgery 6/11. She had been therapeutic on tacrolimus 0.5 mg BID in the he past so we had recommended that. She was supposed to continue sirolimus until therapeutic on tacrolimus. The plan was also to continue prednisone 9 mg daily as she had symptoms with lowering prednisone doses in the past.     She last took her sirolimus this AM. Sirolimus is typically held 2 weeks prior to a surgery due to concerns about wound healing - primary team to talk to CRS about urgency of this procedure as ideally would be delayed until she is off sirolimus for longer unless the surgery is emergent    She also has a new DERREK with creatinine 3. Also having diarrhea which can raise tacrolimus levels    - Given DERREK - would hold tacrolimus 0.5 mg BID (started 5/27) until we see her creatinine trajectory  - Check AM tacrolimus trough (3-5)  - Hold sirolimus given planned surgery in the future  - Continue prednisone 9 mg daily    Dr. Trisha Bradley   Transplant Hepatology

## 2025-05-28 NOTE — LETTER
Communication to Referring Provider                    Luz Thompson MRN# [id number]   YOB: 1950 Age: 75 year old     Date of Admission:  ***  Date of Discharge:  {DISCHARGE DATE:119492}  Admitting Physician:  Al Campbell MD  Discharging Physician: {:[id number]} (Contact: ***)  Discharging Service:  {:[id number]}  Hospitalization Status: {:[id number]}     Primary Care Clinic:  {:[id number]}  Primary Care Provider: Daniel Hidalgo     {   Salutation            :[id number]}            You have been identified as the Primary Care Provider for Luz Thompson, who was recently admitted to {[facility]:[id number]}.  Thank you for the referral to our hospital.  It is our goal to provide the highest quality of care for our patients, including planning for seamless continuity of care by providing you with timely, accurate and concise information.  After reviewing the following combined discharge summary and final progress note, please contact us if you have any remaining questions.  The Discharging Physician will be the best informed, with their contact information listed above.  If unable to reach them, or if you have received this letter in error, please call *** and someone will try to help you.     Electronically signed

## 2025-05-28 NOTE — PLAN OF CARE
Brief transplant infectious disease note:    Paged by primary team for this patient.  Colonic perforation whose fluid collection increased on ertapenem and so has been on tigecycline for the last few weeks and it responded.  The tigecycline was stopped yesterday by geriatrics team per review of notes due to perioral symptoms and thrush.    Team requesting recommendations for perioperative antibiotics since patient is going to OR tomorrow for sigmoid resection.    Recommendations:  Since the symptoms associated with tigecycline do not appear to be typical for allergy or major then favor resuming tigecycline 50 mg BID since it has proven efficacy for shrinking her more seroius intra-abdominal fluid collection  If she is reluctant to resume tigecycline she will need a combination of linezolid 600 mg twice daily and ertapenem 1 g daily  Will be helpful if any purulent material is encountered in the OR for colorectal surgery to send cultures of this  Plan will be to continue for antibiotics for 3-5 days postoperatively    Transplant ID will plan to see this patient postoperatively in consultation tomorrow.    Signed:  Augustin Kam MD , Available on [employer]  Staff Physician, Transplant and General Infectious Diseases   For On Call and Coverage info see [employer]-> Infectious Disease Medicine Adult/[facility]

## 2025-05-28 NOTE — CONSULTS
Colorectal Surgery Consult   May 28, 2025    Luz Thompson  : 1950    Date of Service: 2025 6:22 PM    Chief complaint: abdominal pain, known sigmoid diverticulitis     Assessment and Plan: Patient is a 75yoF with past medical history of atrial fibrillation, DVT on eliquis, CVA, CKD III, HFpEF (TTE 3/9/25 EF 60-65%), T2DM,  biliary cirrhosis s/p liver transplant in , EBV, HTN, HLD, Sjogren's syndrome, Basal Cell Carincoma s/p MOHS on 25, thyroid cancer s/p thyroidectomy in , Sjogren's syndrome, and diverticulitis with recent admission from - for ESBL UTI & E. Faecalis bacteremia related to sigmoid microperforation (treated with IV ertapenem) who now presents for 1-day history of lightheadedness, hypotension, worsening non-bloody diarrhea, and abdominal pain that started on .    She had been seen in colorectal surgery clinic the day prior () where it was decided that she should undergo open sigmoidectomy with DLI. However, given her acute onset symptoms, we think she may need surgery sooner than anticipated. She is currently hemodynamically stable with a benign abdominal exam which doesn't warrant any sort of emergent intervention. Additionally, she has a complex past medical history so further optimization is required.     - No emergent surgical intervention   - Appreciate admission to medicine d/t multiple co-morbidities.   - Recommend keeping NPO  - appreciate ID recommendations  - appreciate Hepatology for the transition from Sirolimus to Tacrolimus  - appreciate [facility] to [facility] for an ostomy (have contacted [facility])  - Recommend 1 single dose of miralax (will avoid a full pre-op bowel prep as pt is having diarrhea)  - HOLD anticoagulation  - Further surgical planning to be discussed with CRS day team     Patient was d/w CRS staff.     Melissa Gonzalez,   General Surgery, PGY-2   Zachariah, Pager [phone]    History of Present Illness:    Luz Thompson is a 75 year  "old female known to the Colorectal Surgery team. She was recently in seen in Colorectal Surgery Clinic on 5/27/25 with Dr. Campbell who recommended open sigmoidectomy and DLI for her ongoing - she reports feeling fine at the time of her appointment. On 5/28, she developed lightheadedness, worsening frequent  \"mushy\" poop, severe abdominal pain. Her abdominal pain has improved. Denies nausea/emesis. Currently she endorses that her abdominal pain has significantly improved. Patient reports wanting to hold off on surgery if possible.     CT A/P on 5/28/25 with decreasing pericolonic fluid collection (~0.2-1.0 cm compared to previous scans), improving pleural effusions, and unchanged groundglass & tree-in-bud opacities in RLL. Labs are notable for rising lactate 3.5 from 2.4, improving leukocytosis with WBC 9.3 from 11.3, chronic anemia with hgb at 9.9 (baseline 8-10), acute on chronic DERREK with Cr 2.6 (baseline 1.3-1.6). U/A positive for leuk esterase , WBC, yeast, casts. Blood cultures are pending. C diff is negative. Enteric bacteria panel is negative. Urine culture is pending.     Currently afebrile, nontachycardic, normotensive, satting well on RA.     Past Medical History:  Past Medical History:   Diagnosis Date    Anemia of chronic disease 10/17/2011    Anxiety     CKD (chronic kidney disease) stage 3, GFR 30-59 ml/min (H) 04/04/2012    Coccidioidomycosis, 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     Deep venous thrombosis     Diverticulosis of sigmoid colon 12/21/2013    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, history of     Received Rituxan during Summer of 2016    Glaucoma     H/O esophageal varices     Hearing loss     Hyperlipidemia 04/10/2012    Says that she does not have it anymore, not on meds    Hypertension     Hypertriglyceridemia     " Liver replaced by transplant (H) 10/17/2011    Dr. 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       Past Surgical History  Past Surgical History:   Procedure Laterality Date    APPENDECTOMY  1961    CATARACT IOL, RT/LT      RE12/19/2013, LE12/10/2013 - Toric lenses    CHOLECYSTECTOMY  1991    COLONOSCOPY  03/10/2014    Procedure: COLONOSCOPY;;  Surgeon: Gentry Raimrez MD;  Location:  GI    CYSTOSCOPY      ear drum repair      ENDOBRONCHIAL ULTRASOUND FLEXIBLE N/A 09/29/2017    Procedure: ENDOBRONCHIAL ULTRASOUND FLEXIBLE;  Flexible Bronchoscopy, Endobronchial Ultrasound, Transbronchial Needle Aspiration ;  Surgeon: Eden Clinton MD;  Location: [facility]    ENDOSCOPIC RETROGRADE CHOLANGIOPANCREATOGRAM  09/19/2013    Procedure: ENDOSCOPIC RETROGRADE CHOLANGIOPANCREATOGRAM;  Endoscopic Retrograde Cholangiopancreatogram with single balloon enteroscopy, ballon sweep of bile duct;  Surgeon: Brett Membreno MD;  Location: [facility] OR     KNEE SCOPE,MED/LAT MENISECTOMY Right 08/10/2012    partial medial menisectomy only    KNEE SURGERY  1966    R knee    PICC INSERTION  09/18/2013    4fr SL PASV PICC, 40cm (1cm external) in the R basilic vein w/ tip in the low SVC    PICC INSERTION  02/21/2014    5 fr DL BioFlo Navilyst PICC, 46 cm (3 cm external) in the L basilic vein w/ tip in the SVC RA junction.    THYROIDECTOMY  03/2010    TRANSPLANT LIVER RECIPIENT LIVING UNRELATED  05/2002       Family History:  Family History  "  Problem Relation Age of Onset    Hypertension Mother     Endometrial Cancer Mother     Hyperlipidemia Mother     Prostate Cancer Father     Macular Degeneration Father     Cancer - colorectal Maternal Grandmother         in her 80's, has surgery and removal of part of kidney,  at age 98    Heart Disease Maternal Grandfather          at 98    Glaucoma Maternal Grandfather     Cerebrovascular Disease Paternal Grandmother         in her 80's    Hypertension Paternal Grandmother     Heart Disease Paternal Grandfather         MI    Alzheimer Disease Paternal Grandfather     Allergies Son     Neurologic Disorder Daughter         Migraines    Breast Cancer Other     Anesthesia Reaction No family hx of     Crohn's Disease No family hx of     Ulcerative Colitis No family hx of     Melanoma No family hx of     Skin Cancer No family hx of        Social History:  Social History     Socioeconomic History    Marital status:      Spouse name: Robbin Thompson    Number of children: 4    Years of education: 20    Highest education level: Not on file   Occupational History    Occupation: Guardian Conservator      Employer: [employer]     Comment: social work     Employer: [employer]   Tobacco Use    Smoking status: Former     Current packs/day: 0.00     Average packs/day: 1 pack/day for 18.0 years (18.0 ttl pk-yrs)     Types: Cigarettes     Start date: 1967     Quit date: 1985     Years since quittin.1    Smokeless tobacco: Never   Vaping Use    Vaping status: Never Used   Substance and Sexual Activity    Alcohol use: Yes     Alcohol/week: 0.0 standard drinks of alcohol     Comment: rare - \"I toast at weddings\"    Drug use: No    Sexual activity: Yes     Partners: Male     Birth control/protection: Post-menopausal   Other Topics Concern    Parent/sibling w/ CABG, MI or angioplasty before 65F 55M? Not Asked     Service Not Asked    Blood Transfusions Not Asked    Caffeine Concern Not " Asked    Occupational Exposure Not Asked    Hobby Hazards Not Asked    Sleep Concern Not Asked    Stress Concern Not Asked    Weight Concern Not Asked    Special Diet Not Asked    Back Care Not Asked    Exercise Yes     Comment: cardio and strengthing    Bike Helmet Not Asked    Seat Belt Not Asked    Self-Exams Not Asked   Social History Narrative    She is retired. [place]. 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     Social Drivers of Health     Financial Resource Strain: Low Risk  (4/15/2025)    Financial Resource Strain     Within the past 12 months, have you or your family members you live with been unable to get utilities (heat, electricity) when it was really needed?: No   Food Insecurity: Low Risk  (4/15/2025)    Food Insecurity     Within the past 12 months, did you worry that your food would run out before you got money to buy more?: No     Within the past 12 months, did the food you bought just not last and you didn t have money to get more?: No   Transportation Needs: Low Risk  (4/15/2025)    Transportation Needs     Within the past 12 months, has lack of transportation kept you from medical appointments, getting your medicines, non-medical meetings or appointments, work, or from getting things that you need?: No   Recent Concern: Transportation Needs - High Risk (1/27/2025)    Transportation Needs     Within the past 12 months, has lack of transportation kept you from medical appointments, getting your medicines, non-medical meetings or appointments, work, or from getting things that you need?: Yes   Physical Activity: Insufficiently Active (11/9/2023)    Received from [facility]    Exercise Vital Sign     Days of Exercise per Week: 5 days     Minutes of Exercise per Session: 10 min   Stress: No Stress Concern Present (11/9/2023)    Received from [facility]    [facility]  Occupational Health - Occupational Stress Questionnaire     Feeling of Stress : Only a little   Social Connections: Feeling Socially Integrated (12/20/2023)    [facility]    OASIS : Social Isolation     Frequency of experiencing loneliness or isolation: Never   Interpersonal Safety: Low Risk  (4/15/2025)    Interpersonal Safety     Do you feel physically and emotionally safe where you currently live?: Yes     Within the past 12 months, have you been hit, slapped, kicked or otherwise physically hurt by someone?: No     Within the past 12 months, have you been humiliated or emotionally abused in other ways by your partner or ex-partner?: No   Housing Stability: Low Risk  (4/15/2025)    Housing Stability     Do you have housing? : Yes     Are you worried about losing your housing?: No   Recent Concern: Housing Stability - High Risk (1/22/2025)    Housing Stability     Do you have housing? : No     Are you worried about losing your housing?: No       Medications:  Current Outpatient Medications   Medication Sig Dispense Refill    acetaminophen (TYLENOL) 500 MG tablet Take 1,000 mg by mouth 2 times daily. During 4/16/25 med recc pt states take BID everyday      amLODIPine (NORVASC) 5 MG tablet Take 1 tablet (5 mg) by mouth daily. HOLD if SBP<100 30 tablet 2    apixaban ANTICOAGULANT (ELIQUIS) 2.5 MG tablet Take 1 tablet (2.5 mg) by mouth 2 times daily. 60 tablet 0    calcitRIOL (ROCALTROL) 0.25 MCG capsule Take 1 capsule (0.25 mcg) by mouth daily. 90 capsule 1    calcium carbonate (TUMS) 500 MG chewable tablet Take 2 tablets (1,000 mg) by mouth every 4 hours as needed for heartburn.      diclofenac (VOLTAREN) 1 % topical gel Apply 4 g topically 4 times daily.      diphenhydrAMINE (BENADRYL) 25 MG tablet Take 25 mg by mouth every 6 hours as needed for itching or allergies.      estradiol (ESTRACE) 0.1 MG/GM vaginal cream Place vaginally three times a week.      ezetimibe (ZETIA) 10 MG tablet Take 1  tablet (10 mg) by mouth daily. 90 tablet 3    Ferrous Sulfate 324 MG TBEC Take 1 tablet by mouth daily.      folic acid (FOLVITE) 1 MG tablet TAKE 1 TABLET BY MOUTH DAILY 90 tablet 3    gabapentin (NEURONTIN) 250 MG/5ML solution Take 6 mLs (300 mg) by mouth at bedtime 180 mL 0    hydrALAZINE (APRESOLINE) 50 MG tablet Take 1 tablet (50 mg) by mouth 3 times daily. hold if SBP <100mmHg.      ketoconazole (NIZORAL) 2 % external shampoo Apply topically twice a week. Lather in the shower, wait 3-5 minutes before rinsing. (Patient taking differently: Apply topically twice a week. On Wed and Sat  Lather in the shower, wait 3-5 minutes before rinsing.)      levothyroxine (SYNTHROID/LEVOTHROID) 175 MCG tablet Take 1 tablet (175 mcg) by mouth daily. 90 tablet 1    linagliptin (TRADJENTA) 5 MG TABS tablet Take 1 tablet (5 mg) by mouth daily. 90 tablet 1    metoprolol succinate ER (TOPROL XL) 25 MG 24 hr tablet Take 25 mg by mouth daily. HOLD if SBP<100 and/or HR<55      Multiple Vitamins-Minerals (PRESERVISION AREDS 2) CAPS Take 1 capsule by mouth 2 times daily      nystatin (MYCOSTATIN) 633272 UNIT/ML suspension Take 1,000,000 Units by mouth 4 times daily.      omega-3 acid ethyl esters (LOVAZA) 1 g capsule Take 1 capsule (1 g) by mouth 2 times daily.      predniSONE (DELTASONE) 1 MG tablet Take 4 tablets (4 mg) by mouth daily. Take 4mg (1mg tablets x4) and Take with 5mg tablet to equal 9mg daily 90 tablet 0    predniSONE (DELTASONE) 5 MG tablet Take 1 tablet (5 mg) by mouth daily. Take with 5mg tablet with 4mg (1mg tablets x4) to equal 9mg daily 30 tablet 0    sertraline (ZOLOFT) 50 MG tablet Take 1 tablet (50 mg) by mouth daily. 30 tablet 0    sirolimus (GENERIC EQUIVALENT) 1 MG tablet Take 1 tablet (1 mg) by mouth daily. 90 tablet 3    tacrolimus (GENERIC EQUIVALENT) 0.5 MG capsule Take 1 capsule (0.5 mg) by mouth every 12 hours. 60 capsule 11    ursodiol (ACTIGALL) 250 MG tablet Take 1 tablet (250 mg) by mouth 2 times  daily. 180 tablet 3    albuterol (PROAIR HFA/PROVENTIL HFA/VENTOLIN HFA) 108 (90 Base) MCG/ACT inhaler Inhale 2 puffs into the lungs every 4 hours as needed for shortness of breath, wheezing or cough. 18 g 0    Continuous Glucose Sensor (FREESTYLE NINO 2 SENSOR) MISC Change every 14 days. 2 each 5    D-MANNOSE PO Take 1 Scoop by mouth 2 times daily.      EPINEPHrine (ANY BX GENERIC EQUIV) 0.3 MG/0.3ML injection 2-pack Inject 0.3 mLs (0.3 mg) into the muscle as needed for anaphylaxis. May repeat one time in 5-15 minutes if response to initial dose is inadequate. 2 each 1    evolocumab (REPATHA SURECLICK) 140 MG/ML prefilled autoinjector Inject 1 mL (140 mg) subcutaneously every 14 days. . Please have fasting labs drawn for further refills. 6 mL 3    Glucagon (GVOKE HYPOPEN) 1 MG/0.2ML pen Inject the contents of 1 device under the skin into lower abdomen, outer thigh, or outer upper arm as needed for hypoglycemia. If no response after 15 minutes, additional 1 mg dose from a new device may be injected while waiting for emergency assistance.      glucose (BD GLUCOSE) 5 g chewable tablet Take 2 tablets (10 g) by mouth as needed (low blood sugar) 40 tablet 1    glucose 40 % (400 mg/mL) gel Take 15-30 g by mouth every 15 minutes as needed for low blood sugar.      magnesium citrate 1.745 GM/30ML solution Drink 1 bottle at 8pm the night before surgery 296 mL 0    metroNIDAZOLE (FLAGYL) 500 MG tablet Take 1 tablet (500 mg) by mouth every 6 hours. At 8:00 am, 2:00 pm, 8:00 pm the day prior to your surgery with neomycin and zofran. 3 tablet 0    neomycin (MYCIFRADIN) 500 MG tablet Take 2 tablets (1,000 mg) by mouth every 6 hours. At 8:00 am, 2:00 pm, 8:00 pm the day prior to your surgery with flagyl and zofran. 6 tablet 0    NONFORMULARY Apply 1 Application topically daily as needed. Momma Bear Nerve Lotion - pt uses on feet      Nutrisource Fiber PO packet Take 1 packet by mouth daily. Benefiber      ondansetron (ZOFRAN ODT)  4 MG ODT tab Take 1 tablet (4 mg) by mouth every 6 hours as needed.      ondansetron (ZOFRAN) 4 MG tablet Take 1 tablet (4 mg) by mouth every 6 hours. At 8:00 am, 2:00 pm, 8:00 pm the day prior to your surgery with neomycin and flagyl. 3 tablet 0    oxymetazoline (AFRIN) 0.05 % nasal spray Spray 0.2 mLs (2 sprays) into both nostrils 2 times daily as needed for congestion. 30 mL 0    polyethylene glycol (MIRALAX) 17 GM/Dose powder Please take 238 grams mixed with 64 oz of Gatorade at 4pm the night before surgery 238 g 0    triamcinolone (KENALOG) 0.1 % external ointment Apply topically 2 times daily. Use 2 weeks or less. 80 g 0       Allergies:     Allergies   Allergen Reactions    Fluconazole Hives and Itching     Full body hives  **Intradermal skin testing negative**  [See intradermal skin testing results from 8/2/2019]    Mycophenolate Diarrhea and Nausea and Vomiting     Patient stated it was chronic and lasted months      Penicillins Anaphylaxis, Hives, Itching and Rash     **Intradermal skin testing negative**  [See intradermal skin testing results from 8/2/2019]      Simvastatin Muscle Pain (Myalgia)     severe  Other reaction(s): Myalgia caused by statin    Methotrexate Other (See Comments)     Other reaction(s): Sore  Sores in mouth, esophagus, and stomach.       Morphine And Codeine Itching and Other (See Comments)     Psych disturbance  Other reaction(s): Confusion, Mood alteration    Quinolones Anxiety, Dizziness, Headache, Other (See Comments), Palpitations and Unknown     Other reaction(s): Hyperactive behavior, Lightheadedness, Mood alteration    Dizzy, light headed    Dizziness, shaky, and jumpy    Capsules, Empty Gelatin [Gelatin]     Carvedilol Diarrhea     Per pt - severe diarrhea and LE swelling  + tolerating Toprol    Lansoprazole Diarrhea    Strawberry Extract     Azithromycin Itching     [See intradermal skin testing results from 8/2/2019]    Bactrim [Sulfamethoxazole-Trimethoprim] Other (See  Comments)     Numb mouth, tingling lips (treated with anti-histamines)    Cephalosporins Itching     [See intradermal skin testing results from 8/2/2019]    Ciprofloxacin Hcl Other (See Comments) and Dizziness     Insomnia, mood lability, Irregular heart beat         Doxycycline Itching and Unknown     [See intradermal skin testing results from 8/2/2019]    Lisinopril Cough    Omeprazole Itching    Tolectin [Nsaids] Rash    Tolmetin Rash and Itching    Tramadol Rash, Hives and Itching       Review of Symptoms:  A 10 point review of symptoms has been conducted and is negative except for that mentioned in the above HPI.    Physical Exam:  Blood pressure 112/50, pulse 87, temperature 98.4  F (36.9  C), temperature source Oral, resp. rate 18, last menstrual period 06/01/1988, SpO2 95%, not currently breastfeeding.  A&O, NAD   NLB on RA   RRR   Abd was very soft, ND, NT. Patient is ticklish and will jump with palpation but denies any pain. No guarding. No rigidity.   CN II-XII grossly intact, no focal deficits     Labs:  BMP  Recent Labs   Lab 05/28/25  1343 05/27/25  1136    136   POTASSIUM 4.0 4.6   CHLORIDE 97* 97*   CO2 21* 20*   .4* 103.0*   CR 2.99* 2.44*   * 98     CBC  Recent Labs   Lab 05/28/25  1343 05/27/25  1136   WBC 9.3 11.3*   HGB 9.9* 11.3*    370     LFT  Recent Labs   Lab 05/28/25  1343 05/27/25  1136   AST 23 26   ALT 27 28   ALKPHOS 160* 178*   BILITOTAL 0.4 0.4   ALBUMIN 3.2* 3.6     Recent Labs   Lab 05/28/25  1343 05/27/25  1136   * 98       Imaging:  CT Abdomen/Pelvis (12/10/2024):                                                    IMPRESSION:   1.  Left colonic diverticula. Small amount of fluid adjacent to the sigmoid colon possibly secondary to acute diverticulitis. No additional inflammatory changes or evidence for obstruction.  2.  Constipation.  3.  Liver transplant.   4.  Calcified pleural plaques right lung likely related to prior asbestos exposure. Small  groundglass opacities right lower lobe, of indeterminate time frame, possibly chronic.  5.  Tiny hyperdense lesions left kidney too small to further characterize, but could represent proteinaceous or hemorrhagic cysts.     CT Abdomen/Pelvis (4/14/2025):  IMPRESSION:  1.  New fat stranding and mesenteric edema about the distal sigmoid  colon in the midline, nonspecific, but may represent diverticulitis.  Suspected contained microperforation with small fluid collection  containing tiny focus of air posterior to the sigmoid colon.  2.  Distended bladder within normal limits.  3.  Unchanged fluid density 1.2 cm lesion in the region of the  pancreatic head.  4.  Stable postsurgical changes of liver transplant and curvilinear  hypodensity in the inferior right lobe.  5.  Other chronic CT findings including small bilateral pleural  effusions and right greater than left basilar calcified opacities are  unchanged.     CT Abdomen/Pelvis (5/5/2025):  IMPRESSION:   1. Mild enlargement of fluid collection containing focus of air  posterior to the sigmoid colon measuring up to 4.1 cm in craniocaudal  dimension, which remains concerning for a contained perforation from  prior diverticulitis.  2. 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  3. Postsurgical changes of liver transplant with stable curvilinear  hypodensity in the inferior right lobe. Interval new mild pneumobilia.  4. Reduced trace bilateral pleural effusions with adjacent  atelectasis.   5. Stable ancillary findings as detailed in the body of the report.     CT Abdomen/Pelvis (5/28/2025):  IMPRESSION:   1.  No acute findings in the abdomen or pelvis.  2.  Decreasing size of pericolonic fluid collection.  3.  Small right pleural effusion, decreased compared to prior. Left pleural effusion has resolved.  4.  Unchanged groundglass and tree-in-bud opacities in the right lower lobe.

## 2025-05-28 NOTE — ED TRIAGE NOTES
Pt comes to [facility] where she was supposed to discharge home tomorrow with a surgery date of June 11th for a temporary colostomy. She is having lower abdominal pain with diverticulitis. She is also supposed to have an infectious disease consult this afternoon. Blood pressure has been low, she got 500cc fluids given by EMS.

## 2025-05-28 NOTE — ED PROVIDER NOTES
[facility] EMERGENCY DEPARTMENT ([facility])    5/28/25      ED PROVIDER NOTE   History     Chief Complaint   Patient presents with    Abdominal Pain    Skin Ulcer     The history is provided by the patient and medical records.     Luz Thompson is a 75 year old female with a history of primary biliary cirrhosis s/p liver transplant in 2002, paroxysmal atrial fibrillation on apixaban, HTN, T2DM, CKD, HF, hx of CVA, hypothyroidism, and recurrent ESBL UTIs who presents to the ED for abdominal pain. Patient states she was at TCU at [facility] in [place] for a month and a half d/t intraabdominal infection. Patient states that this morning she had breakfast without any immediate concerns. Patient hours later began having severe abdominal pain and involuntary loose stools. Patient has 2 episodes of bowel movements in her pants and then went to the rest room. In the rest room, patient endorses associated near loss of consciousness due to cramping pain while having bowel movement. Patient continued to have pain after bowel movement and notes almost losing consciousness once more. Here in the ED, patient says pain is contained. Patient says there is pain in her left lower quadrant that spreads to lower mid abdomen. Describes it as cramping pain. Patient notes the presence of her abscess in abdomen. Patient says at TCU, she has PICC line and was given IV antibiotics to shrink her abscess and multidrug-resistant UTI (tigrecycline?). She recently states she had a negative reaction of lips numbing d/t to IV antibiotics. Patient has a surgery date in June 11th for a temporary colostomy. She is also supposed to have an infectious disease consult at 1545. Patient believes she TCU had no probable plan for patient and sent her to [facility]. Patient denies blood in stool. No vomiting.       Past Medical History  Past Medical History:   Diagnosis Date    Anemia of chronic disease 10/17/2011    Anxiety     CKD (chronic kidney  NURSE NOTES:

received pt from [facility] pt is vent dependent and O2sat is 100%. pt is lethargic and 
sleeping at this moment. Charles cath is in draining well with gravity. Gtube site is intact, 
patent, and clean. unable to asses neuro at this moment. skin sacral stage 1, and DTI on 
right heel upon admission. TU picc line noted , clean, patent, and intact.  seizure 
precaution implemented. will continue to monitor pt with plan of care. call light within 
reach. bed at the lowest position, alarmed, and locked. 

-------------------------------------------------------------------------------

Addendum: 11/26/19 at 0811 by GRAYSON KAY RN

-------------------------------------------------------------------------------

wrong time. 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 Gentry Ramirez, [facility] GI      Lung infection 11/24/2023    Macular degeneration     Migraines 04/04/2012    Mumps, history of     Nonsenile cataract     Osteoarthritis of right knee 08/02/2012    Osteoporosis 04/20/2012    Paroxysmal atrial fibrillation 06/13/2017    Postablative hypothyroidism 08/13/2012    Primary biliary cirrhosis (H)     s/p Liver transplant, [id number]    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     Past Surgical History:   Procedure Laterality Date    APPENDECTOMY  1961    CATARACT IOL, RT/LT      RE12/19/2013, LE12/10/2013 - Toric lenses    CHOLECYSTECTOMY  1991    COLONOSCOPY  03/10/2014    Procedure: COLONOSCOPY;;  Surgeon: Gentry Ramirez MD;  Location:  GI    CYSTOSCOPY      ear drum repair      ENDOBRONCHIAL ULTRASOUND FLEXIBLE N/A 09/29/2017    Procedure: ENDOBRONCHIAL ULTRASOUND FLEXIBLE;  Flexible Bronchoscopy, Endobronchial Ultrasound, Transbronchial Needle Aspiration ;  Surgeon: Eden Clinton MD;  Location:  OR    ENDOSCOPIC RETROGRADE CHOLANGIOPANCREATOGRAM  09/19/2013    Procedure: ENDOSCOPIC RETROGRADE CHOLANGIOPANCREATOGRAM;  Endoscopic Retrograde " Cholangiopancreatogram with single balloon enteroscopy, ballon sweep of bile duct;  Surgeon: Brett Membreno MD;  Location: [facility] OR    HC KNEE SCOPE,MED/LAT MENISECTOMY Right 08/10/2012    partial medial menisectomy only    KNEE SURGERY  1966    R knee    PICC INSERTION  09/18/2013    4fr SL PASV PICC, 40cm (1cm external) in the R basilic vein w/ tip in the low SVC    PICC INSERTION  02/21/2014    5 fr DL BioFlo Navilyst PICC, 46 cm (3 cm external) in the L basilic vein w/ tip in the SVC RA junction.    THYROIDECTOMY  03/2010    TRANSPLANT LIVER RECIPIENT LIVING UNRELATED  05/2002     acetaminophen (TYLENOL) 500 MG tablet  amLODIPine (NORVASC) 5 MG tablet  apixaban ANTICOAGULANT (ELIQUIS) 2.5 MG tablet  calcitRIOL (ROCALTROL) 0.25 MCG capsule  calcium carbonate (TUMS) 500 MG chewable tablet  diclofenac (VOLTAREN) 1 % topical gel  diphenhydrAMINE (BENADRYL) 25 MG tablet  estradiol (ESTRACE) 0.1 MG/GM vaginal cream  ezetimibe (ZETIA) 10 MG tablet  Ferrous Sulfate 324 MG TBEC  folic acid (FOLVITE) 1 MG tablet  gabapentin (NEURONTIN) 250 MG/5ML solution  hydrALAZINE (APRESOLINE) 50 MG tablet  ketoconazole (NIZORAL) 2 % external shampoo  levothyroxine (SYNTHROID/LEVOTHROID) 175 MCG tablet  linagliptin (TRADJENTA) 5 MG TABS tablet  metoprolol succinate ER (TOPROL XL) 25 MG 24 hr tablet  Multiple Vitamins-Minerals (PRESERVISION AREDS 2) CAPS  nystatin (MYCOSTATIN) 381052 UNIT/ML suspension  omega-3 acid ethyl esters (LOVAZA) 1 g capsule  predniSONE (DELTASONE) 1 MG tablet  predniSONE (DELTASONE) 5 MG tablet  sertraline (ZOLOFT) 50 MG tablet  sirolimus (GENERIC EQUIVALENT) 1 MG tablet  tacrolimus (GENERIC EQUIVALENT) 0.5 MG capsule  ursodiol (ACTIGALL) 250 MG tablet  albuterol (PROAIR HFA/PROVENTIL HFA/VENTOLIN HFA) 108 (90 Base) MCG/ACT inhaler  Continuous Glucose Sensor (FREESTYLE NINO 2 SENSOR) MISC  D-MANNOSE PO  EPINEPHrine (ANY BX GENERIC EQUIV) 0.3 MG/0.3ML injection 2-pack  evolocumab (REPATHA SURECLICK) 140 MG/ML  prefilled autoinjector  Glucagon (GVOKE HYPOPEN) 1 MG/0.2ML pen  glucose (BD GLUCOSE) 5 g chewable tablet  glucose 40 % (400 mg/mL) gel  magnesium citrate 1.745 GM/30ML solution  metroNIDAZOLE (FLAGYL) 500 MG tablet  neomycin (MYCIFRADIN) 500 MG tablet  NONFORMULARY  Nutrisource Fiber PO packet  ondansetron (ZOFRAN ODT) 4 MG ODT tab  ondansetron (ZOFRAN) 4 MG tablet  oxymetazoline (AFRIN) 0.05 % nasal spray  polyethylene glycol (MIRALAX) 17 GM/Dose powder  triamcinolone (KENALOG) 0.1 % external ointment      Allergies   Allergen Reactions    Fluconazole Hives and Itching     Full body hives  **Intradermal skin testing negative**  [See intradermal skin testing results from 8/2/2019]    Mycophenolate Diarrhea and Nausea and Vomiting     Patient stated it was chronic and lasted months      Penicillins Anaphylaxis, Hives, Itching and Rash     **Intradermal skin testing negative**  [See intradermal skin testing results from 8/2/2019]      Simvastatin Muscle Pain (Myalgia)     severe  Other reaction(s): Myalgia caused by statin    Methotrexate Other (See Comments)     Other reaction(s): Sore  Sores in mouth, esophagus, and stomach.       Morphine And Codeine Itching and Other (See Comments)     Psych disturbance  Other reaction(s): Confusion, Mood alteration    Quinolones Anxiety, Dizziness, Headache, Other (See Comments), Palpitations and Unknown     Other reaction(s): Hyperactive behavior, Lightheadedness, Mood alteration    Dizzy, light headed    Dizziness, shaky, and jumpy    Capsules, Empty Gelatin [Gelatin]     Carvedilol Diarrhea     Per pt - severe diarrhea and LE swelling  + tolerating Toprol    Lansoprazole Diarrhea    Strawberry Extract     Azithromycin Itching     [See intradermal skin testing results from 8/2/2019]    Bactrim [Sulfamethoxazole-Trimethoprim] Other (See Comments)     Numb mouth, tingling lips (treated with anti-histamines)    Cephalosporins Itching     [See intradermal skin testing results from  "2019]    Ciprofloxacin Hcl Other (See Comments) and Dizziness     Insomnia, mood lability, Irregular heart beat         Doxycycline Itching and Unknown     [See intradermal skin testing results from 2019]    Lisinopril Cough    Omeprazole Itching    Tolectin [Nsaids] Rash    Tolmetin Rash and Itching    Tramadol Rash, Hives and Itching     Family History  Family History   Problem Relation Age of Onset    Hypertension Mother     Endometrial Cancer Mother     Hyperlipidemia Mother     Prostate Cancer Father     Macular Degeneration Father     Cancer - colorectal Maternal Grandmother         in her 80's, has surgery and removal of part of kidney,  at age 98    Heart Disease Maternal Grandfather          at 98    Glaucoma Maternal Grandfather     Cerebrovascular Disease Paternal Grandmother         in her 80's    Hypertension Paternal Grandmother     Heart Disease Paternal Grandfather         MI    Alzheimer Disease Paternal Grandfather     Allergies Son     Neurologic Disorder Daughter         Migraines    Breast Cancer Other     Anesthesia Reaction No family hx of     Crohn's Disease No family hx of     Ulcerative Colitis No family hx of     Melanoma No family hx of     Skin Cancer No family hx of      Social History   Social History     Tobacco Use    Smoking status: Former     Current packs/day: 0.00     Average packs/day: 1 pack/day for 18.0 years (18.0 ttl pk-yrs)     Types: Cigarettes     Start date: 1967     Quit date: 1985     Years since quittin.1    Smokeless tobacco: Never   Vaping Use    Vaping status: Never Used   Substance Use Topics    Alcohol use: Yes     Alcohol/week: 0.0 standard drinks of alcohol     Comment: rare - \"I toast at weddings\"    Drug use: No      A medically appropriate review of systems was performed with pertinent positives and negatives noted in the HPI, and all other systems negative.    Physical Exam   BP: (!) 108/37  Pulse: 60  Temp: 98.4  F (36.9 "  C)  Resp: 16  SpO2: 99 %  Physical Exam  Vitals and nursing note reviewed.   Constitutional:       General: She is not in acute distress.     Appearance: Normal appearance. She is not diaphoretic.   HENT:      Head: Atraumatic.      Mouth/Throat:      Mouth: Mucous membranes are dry.      Tongue: Lesions (Thrush on tongue) present.   Eyes:      General: No scleral icterus.     Conjunctiva/sclera: Conjunctivae normal.   Cardiovascular:      Rate and Rhythm: Normal rate.      Heart sounds: Normal heart sounds.   Pulmonary:      Effort: No respiratory distress.      Breath sounds: Normal breath sounds.   Abdominal:      General: Abdomen is flat.      Tenderness: There is abdominal tenderness in the periumbilical area and left lower quadrant. There is no guarding or rebound.   Musculoskeletal:      Cervical back: Neck supple.   Skin:     General: Skin is warm.      Findings: No rash.   Neurological:      Mental Status: She is alert.         ED Course, Procedures, & Data      Procedures         Lactic acid elevated due to diarrhea and dehydration. At this time there are no signs of sepsis or septic shock  IV Antibiotics given and/or elevated Lactate of 2.5 and no sepsis note found - Delete this reminder and enter the sepsis note or '.edcms' before signing chart.>>>     Results for orders placed or performed during the hospital encounter of 05/28/25   Comprehensive metabolic panel     Status: Abnormal   Result Value Ref Range    Sodium 136 135 - 145 mmol/L    Potassium 4.0 3.4 - 5.3 mmol/L    Carbon Dioxide (CO2) 21 (L) 22 - 29 mmol/L    Anion Gap 18 (H) 7 - 15 mmol/L    Urea Nitrogen 117.4 (H) 8.0 - 23.0 mg/dL    Creatinine 2.99 (H) 0.51 - 0.95 mg/dL    GFR Estimate 16 (L) >60 mL/min/1.73m2    Calcium 7.3 (L) 8.8 - 10.4 mg/dL    Chloride 97 (L) 98 - 107 mmol/L    Glucose 112 (H) 70 - 99 mg/dL    Alkaline Phosphatase 160 (H) 40 - 150 U/L    AST 23 0 - 45 U/L    ALT 27 0 - 50 U/L    Protein Total 5.8 (L) 6.4 - 8.3 g/dL     Albumin 3.2 (L) 3.5 - 5.2 g/dL    Bilirubin Total 0.4 <=1.2 mg/dL   Lactic acid whole blood with 1x repeat in 2 hr when >2     Status: Abnormal   Result Value Ref Range    Lactic Acid, Initial 2.5 (H) 0.7 - 2.0 mmol/L   CBC with platelets and differential     Status: Abnormal   Result Value Ref Range    WBC Count 9.3 4.0 - 11.0 10e3/uL    RBC Count 3.60 (L) 3.80 - 5.20 10e6/uL    Hemoglobin 9.9 (L) 11.7 - 15.7 g/dL    Hematocrit 30.8 (L) 35.0 - 47.0 %    MCV 86 78 - 100 fL    MCH 27.5 26.5 - 33.0 pg    MCHC 32.1 31.5 - 36.5 g/dL    RDW 18.0 (H) 10.0 - 15.0 %    Platelet Count 291 150 - 450 10e3/uL    % Neutrophils 94 %    % Lymphocytes 3 %    % Monocytes 2 %    % Eosinophils 0 %    % Basophils 0 %    % Immature Granulocytes 1 %    NRBCs per 100 WBC 0 <1 /100    Absolute Neutrophils 8.8 (H) 1.6 - 8.3 10e3/uL    Absolute Lymphocytes 0.3 (L) 0.8 - 5.3 10e3/uL    Absolute Monocytes 0.2 0.0 - 1.3 10e3/uL    Absolute Eosinophils 0.0 0.0 - 0.7 10e3/uL    Absolute Basophils 0.0 0.0 - 0.2 10e3/uL    Absolute Immature Granulocytes 0.1 <=0.4 10e3/uL    Absolute NRBCs 0.0 10e3/uL   CBC with platelets differential     Status: Abnormal    Narrative    The following orders were created for panel order CBC with platelets differential.  Procedure                               Abnormality         Status                     ---------                               -----------         ------                     CBC with platelets and ...[[id number]]  Abnormal            Final result                 Please view results for these tests on the individual orders.     Medications   ondansetron (ZOFRAN) injection 4 mg (4 mg Intravenous $Given 5/28/25 1340)   tacrolimus (GENERIC EQUIVALENT) capsule 0.5 mg (has no administration in time range)   predniSONE (DELTASONE) half-tab 9 mg (has no administration in time range)   sodium chloride 0.9% BOLUS 1,000 mL (0 mLs Intravenous Stopped 5/28/25 1520)   HYDROmorphone (DILAUDID) injection 0.2 mg (0.2  mg Intravenous $Given 5/28/25 1345)   diphenhydrAMINE (BENADRYL) injection 12.5 mg (12.5 mg Intravenous $Given 5/28/25 1400)     Labs Ordered and Resulted from Time of ED Arrival to Time of ED Departure   COMPREHENSIVE METABOLIC PANEL - Abnormal       Result Value    Sodium 136      Potassium 4.0      Carbon Dioxide (CO2) 21 (*)     Anion Gap 18 (*)     Urea Nitrogen 117.4 (*)     Creatinine 2.99 (*)     GFR Estimate 16 (*)     Calcium 7.3 (*)     Chloride 97 (*)     Glucose 112 (*)     Alkaline Phosphatase 160 (*)     AST 23      ALT 27      Protein Total 5.8 (*)     Albumin 3.2 (*)     Bilirubin Total 0.4     LACTIC ACID WHOLE BLOOD WITH 1X REPEAT IN 2 HR WHEN >2 - Abnormal    Lactic Acid, Initial 2.5 (*)    CBC WITH PLATELETS AND DIFFERENTIAL - Abnormal    WBC Count 9.3      RBC Count 3.60 (*)     Hemoglobin 9.9 (*)     Hematocrit 30.8 (*)     MCV 86      MCH 27.5      MCHC 32.1      RDW 18.0 (*)     Platelet Count 291      % Neutrophils 94      % Lymphocytes 3      % Monocytes 2      % Eosinophils 0      % Basophils 0      % Immature Granulocytes 1      NRBCs per 100 WBC 0      Absolute Neutrophils 8.8 (*)     Absolute Lymphocytes 0.3 (*)     Absolute Monocytes 0.2      Absolute Eosinophils 0.0      Absolute Basophils 0.0      Absolute Immature Granulocytes 0.1      Absolute NRBCs 0.0     ROUTINE [facility] WITH MICROSCOPIC REFLEX TO CULTURE   LACTIC ACID WHOLE BLOOD   ENTERIC BACTERIA AND VIRUS PANEL BY PCR   C. DIFFICILE TOXIN B PCR WITH REFLEX TO C. DIFFICILE EIA   BLOOD CULTURE   BLOOD CULTURE     CT Abdomen Pelvis w/o Contrast    (Results Pending)          Critical care was not performed.     Medical Decision Making  The patient's presentation was of high complexity (an acute health issue posing potential threat to life or bodily function).    The patient's evaluation involved:  review of external note(s) from 3+ sources (review of multiple notes from infectious disease, recent hospitalization, colorectal surgery,  and liver transplant)  review of 3+ test result(s) ordered prior to this encounter (see separate area of note for details)  ordering and/or review of 3+ test(s) in this encounter (see separate area of note for details)  independent interpretation of testing performed by another health professional (CT abdomen pelvis images reviewed and reviewed radiologist interpretation)  discussion of management or test interpretation with another health professional (discussed with admitting hospitalist service, discussed with liver transplant service, discussed with colorectal surgery)    The patient's management necessitated moderate risk (prescription drug management including medications given in the ED), high risk (a parenteral controlled substance), and high risk (a decision regarding hospitalization).    Assessment & Plan    Patient with multiple medical problems who is a resident of the TCU following prolonged hospital admission.  She has a history of liver transplant in 2002, chronic kidney disease, recent diverticulitis with perforation and intra-abdominal abscess which is being followed by colorectal surgery with plan for surgical placement of colostomy in early June, and receiving long-term IV antibiotics at the TCU.  Apparently was scheduled to be discharged from the TCU tomorrow, but this morning developed generalized abdominal cramping and multiple episodes of nonbloody uncontrollable diarrhea.  Differential diagnosis for new diarrhea includes C. difficile colitis, other viral enteritis or gastroenteritis including norovirus/rotavirus/other, antibiotic induced diarrhea, exacerbation of diverticulitis or intra-abdominal abscess, foodborne illness, adverse reaction to other medications including antirejection medications.  Exam blood pressure 108/37 she is afebrile.  She is tired appearing but normal mental status, dry mucous membranes may have thrush on her tongue.  She has some generalized abdominal tenderness  greatest in the left lower quadrant and periumbilical region but no signs of acute peritonitis.  Physical exam otherwise unremarkable.  Labs show slight worsening of her chronic kidney disease, elevated lactate, slight worsening of baseline anemia.  Was treated with IV fluids, Zofran, small dose of Dilaudid.  Stated that Dilaudid made her lips feel itchy and numb and so was given a dose of IV Benadryl.  Is resting comfortably no other signs of anaphylaxis or allergy.  Has remained hemodynamically stable in this ED.  I was contacted by the liver transplant service, they will write for the liver transplant meds and will stop sirolimus, patient is being admitted to the medicine service.  Later contacted by colorectal surgery, they may move up her pending colostomy surgery to tomorrow if possible.  They are hoping she can be admitted to the [facility] to make that possible.  I spoke with the hospitalist and arrange for the patient to be admitted, I have requested the [facility].  Colorectal surgery would also like her to board in the [facility] ED if necessary pending bed placement.  Her C. difficile is not back yet, still have concern for C. difficile colitis based on recent antibiotic treatment and copious diarrhea.  The patient appears stable for transfer.  Will obtain CT for further information and possible pending surgery.  Her CT does not show any acute changes.  Spoke with the attending at the [facility] ED Dr. Quiñones who will accept the patient in transfer.    I have reviewed the nursing notes. I have reviewed the findings, diagnosis, plan and need for follow up with the patient.    New Prescriptions    No medications on file       Final diagnoses:   Diarrhea of presumed infectious origin   Diverticulitis of large intestine with abscess without bleeding   Generalized weakness   Status post liver transplantation (H)   Marco PHILLIPS Her, am serving as a trained medical scribe to document services personally performed by aSmson  MD Meera, based on the provider's statements to me.     I, Samson Estes MD, was physically present and have reviewed and verified the accuracy of this note documented by Marco Golden.     Samson Estes MD  [facility] EMERGENCY DEPARTMENT  5/28/2025     Samson Estes MD  05/28/25 1539       Samson Estes MD  05/28/25 4501

## 2025-05-28 NOTE — PHARMACY-ADMISSION MEDICATION HISTORY
Pharmacist Admission Medication History    Admission medication history is complete. The information provided in this note is only as accurate as the sources available at the time of the update.    Information Source(s): Facility ([facility]/) medication list/MAR via N/A    Pertinent Information: MAR from [facility] was used to verified PTA meds    Changes made to PTA medication list:  Added: nystatin  Deleted: tigecycline   Changed: None    Allergies reviewed with patient and updates made in EHR: no    Medication History Completed By: Pranav Dumont RPH 5/28/2025 3:23 PM    PTA Med List   Medication Sig Last Dose/Taking    acetaminophen (TYLENOL) 500 MG tablet Take 1,000 mg by mouth 2 times daily. During 4/16/25 med recc pt states take BID everyday 5/28/2025 Morning    amLODIPine (NORVASC) 5 MG tablet Take 1 tablet (5 mg) by mouth daily. HOLD if SBP<100 5/28/2025 Morning    apixaban ANTICOAGULANT (ELIQUIS) 2.5 MG tablet Take 1 tablet (2.5 mg) by mouth 2 times daily. 5/28/2025 Morning    calcitRIOL (ROCALTROL) 0.25 MCG capsule Take 1 capsule (0.25 mcg) by mouth daily. 5/28/2025 Morning    calcium carbonate (TUMS) 500 MG chewable tablet Take 2 tablets (1,000 mg) by mouth every 4 hours as needed for heartburn. 5/28/2025 Noon    diclofenac (VOLTAREN) 1 % topical gel Apply 4 g topically 4 times daily. 5/28/2025 Morning    diphenhydrAMINE (BENADRYL) 25 MG tablet Take 25 mg by mouth every 6 hours as needed for itching or allergies. 5/27/2025 Morning    estradiol (ESTRACE) 0.1 MG/GM vaginal cream Place vaginally three times a week. Past Week    ezetimibe (ZETIA) 10 MG tablet Take 1 tablet (10 mg) by mouth daily. 5/27/2025 Evening    Ferrous Sulfate 324 MG TBEC Take 1 tablet by mouth daily. 5/27/2025 Evening    folic acid (FOLVITE) 1 MG tablet TAKE 1 TABLET BY MOUTH DAILY 5/27/2025 Evening    gabapentin (NEURONTIN) 250 MG/5ML solution Take 6 mLs (300 mg) by mouth at bedtime 5/27/2025 Bedtime     hydrALAZINE (APRESOLINE) 50 MG tablet Take 1 tablet (50 mg) by mouth 3 times daily. hold if SBP <100mmHg. 5/28/2025 Morning    ketoconazole (NIZORAL) 2 % external shampoo Apply topically twice a week. Lather in the shower, wait 3-5 minutes before rinsing. (Patient taking differently: Apply topically twice a week. On Wed and Sat  Lather in the shower, wait 3-5 minutes before rinsing.) 5/28/2025    levothyroxine (SYNTHROID/LEVOTHROID) 175 MCG tablet Take 1 tablet (175 mcg) by mouth daily. 5/28/2025 Morning    linagliptin (TRADJENTA) 5 MG TABS tablet Take 1 tablet (5 mg) by mouth daily. 5/28/2025 Morning    metoprolol succinate ER (TOPROL XL) 25 MG 24 hr tablet Take 25 mg by mouth daily. HOLD if SBP<100 and/or HR<55 5/28/2025 Morning    Multiple Vitamins-Minerals (PRESERVISION AREDS 2) CAPS Take 1 capsule by mouth 2 times daily 5/28/2025 Morning    nystatin (MYCOSTATIN) 552161 UNIT/ML suspension Take 1,000,000 Units by mouth 4 times daily. 5/28/2025 Morning    omega-3 acid ethyl esters (LOVAZA) 1 g capsule Take 1 capsule (1 g) by mouth 2 times daily. 5/27/2025 Evening    predniSONE (DELTASONE) 1 MG tablet Take 4 tablets (4 mg) by mouth daily. Take 4mg (1mg tablets x4) and Take with 5mg tablet to equal 9mg daily 5/28/2025 Morning    predniSONE (DELTASONE) 5 MG tablet Take 1 tablet (5 mg) by mouth daily. Take with 5mg tablet with 4mg (1mg tablets x4) to equal 9mg daily 5/28/2025 Morning    sertraline (ZOLOFT) 50 MG tablet Take 1 tablet (50 mg) by mouth daily. 5/28/2025 Morning    sirolimus (GENERIC EQUIVALENT) 1 MG tablet Take 1 tablet (1 mg) by mouth daily. 5/27/2025 Evening    tacrolimus (GENERIC EQUIVALENT) 0.5 MG capsule Take 1 capsule (0.5 mg) by mouth every 12 hours. 5/28/2025 Morning    ursodiol (ACTIGALL) 250 MG tablet Take 1 tablet (250 mg) by mouth 2 times daily. 5/28/2025 Morning

## 2025-05-28 NOTE — H&P
[facility]    History and Physical - Hospitalist Service, GOLD TEAM        Date of Admission:  5/28/2025    Assessment & Plan      Luz Thompson is a 75 year old female patient with a past medical history significant for primary biliary cirrhosis s/p liver transplant 2002, paroxysmal atrial fibrillation on Eliquis w/ loop recorder in place, HFpEF (TTE 3/9/25 EF 60-65%), RASHIDA, DVT in distant past, T2DM, lymphedema, HTN, HLD, CKD3, CVA distant past without residual deficits, rectal perforation w/ retroflexion on colonoscopy treated w/ 3 clips 8/2017, slow transit constipation, Sjogren's syndrome, anxiety, glaucoma, anemia, chronic back pain, osteoporosis, [place] fever 2016/2017 on Voriconazole until 6/2024 (Fluconazole caused severe rash), EBV viremia s/p rituxan 2016, appendectomy, cholecystectomy, hypothyroidism after thyroidectomy 2010 (papillary thyroid carcinoma), chronic low back pain and shoulder pain, depression, 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    Pre-Op Evaluation:   - Patient not medically optimized for surgery at this time and would not recommend proceeding unless emergent. Her last dose of Sirolimus was morning 5/28/25 and per hepatology, normally they recommend holding for 2 weeks prior to surgical interventions due to possibility of poor wound healing.   - Last dose of Eliquis was morning 5/28/25.   - Enteric panel and blood cultures are pending.    LLQ Abdominal Cramping  Non-Bloody Diarrhea  Sudden onset today. Her symptoms are significantly improved after 1L NS bolus and IV  "Dilaudid 0.2mg x 1 dose in ED; note that the Dilaudid caused lip \"numbness\" and thus she received 12.5mg IV Benadryl with improvement. Patient reports morphine makes her itch. She has tolerated Fentanyl previously and Oxycodone. CT A/P in ED with no acute changes, decreasing size of pericolonic fluid collection. WBC 9.3, lactate 2.5. C Diff negative.   - Follow up enteric panel   - Supportive cares with LR 100cc/hr for now   - Tylenol 975mg BID ordered. Consider low doses oxycodone if needed.    AGMA 2/2 Lactic Acidosis  . C Diff negative. Highly suspected related to acute dehydration in setting of diarrhea.   - LR 100cc/hr and recheck BMP and lactate at 2000 to eval for improvement   - Restarted Ertapenem and added Vancomycin (see below)   - Follow up blood cultures from 5/28   - Follow up enteric panel and UA   - Follow up CXR    Sigmoid Diverticulitis c/b Microperforation  Recurrent UTIs and Bacteremia (ESBL, E. Faecalis) on Ertapenem (3/9/25 --> 5/8/25, Tigacycline 5/8/25 --> 5/27/25)  Known intraabdominal infection secondary to a sigmoid microperforation. Recent admission in March 2025 for Klebsiella pneumonia UTI and E. Faecalis bacteremia with recommendations from ID for ertapenem which was started 3/9 and changed to Tigacycline 5/8 due to poor abscess response. Tigacycline was scheduled to continue to 5/28 but was stopped on 5/27 due to an episode of lip numbness that patient reports is typical of an adverse drug allergy/reaction for her. She was scheduled for open sigmoidectomy and DLI with colorectal but needs urology to place stents and hepatology involvement for IPMN. This procedure was going to be done June 11th. In ED, abdominal exam benign and WBC normal. CT A/P wo 5/28 with decreasing size of pericolonic fluid collection.    - ID consult placed:  Since the symptoms associated with tigecycline do not appear to be typical for allergy or major then favor resuming tigecycline 50 mg BID since it has " "proven efficacy for shrinking her more seroius intra-abdominal fluid collection  If she is reluctant to resume tigecycline she will need a combination of linezolid 600 mg twice daily and ertapenem 1 g daily  Will be helpful if any purulent material is encountered in the OR for colorectal surgery to send cultures of this  Plan will be to continue for antibiotics for 3-5 days postoperatively   - Despite extensive discussion with patient regarding risk for inadequate antimicrobial coverage, she is refusing both Tigacycline and Linezolid (PO and IV) due to reactions of \"lip numbness\" in the past. She is agreeable to Ertapenem which we have started. Vancomycin was started as well since previous E Faecalis was sensitive to this and patient agreeable, though I discussed with patient at length that ID recommendations are for Linezolid or Tigacycline. I discussed with patient possibility of renal injury with Vancomycin, especially given she already has an DERREK, but she wishes to proceed with this therapy.   - Colorectal Surgery consult placed and they are considering open sigmoidectomy and DLI tomorrow morning at 9am. Awaiting formal recs.   - Urology consult placed since it appears patient is scheduled for ureteral stent prior to sigmoidectomy.    DERREK on CKD3b 2/2 Suspected Dehydration  Hypochloremia  Hyperphosphatemia  AGMA  Baseline creatinine ~1.5. History of dialysis at time of liver transplant 23 years ago. Recurrent AKIs and immunosuppressive medication toxicity. Patient reports feeling very dehydrated recently. She received 1L IVF bolus in ED. Creatinine at admission 2.99, .4, , chloride 97, phos 4.9, anion gap 18.   - Recheck BMP at 2000 to assess for anion gap closure   - LR 100cc/hr overnight and recheck BMP in am   - Hold nephrotoxic medications as able   - Check Tacro level in am    Painless Post-Menopausal Vaginal Bleeding  Seen by OBGYN team 5/23/25 after two episodes of saturating underwear with " vaginal bleeding. They strongly recommended transvaginal US and if stripe is greater than 4mm or if less than 4mm but continue to have bleeding, then she will need biopsy with D&C due to inability to tolerate office procedure. This would ideally be combined with either urology colorectal procedure to minimize anesthesia needs in this complicated patient. Patient reports last instance of vaginal bleeding was 1-2 weeks ago.   - Ordered transvaginal US   - OBGYN consult to try and coordinate timing of biopsy with other surgical needs   - Continue PTA Estradiol cream 2g 3 times weekly    Primary Biliary Cirrhosis s/p Liver Transplant (2002)  Possible IPMN (1.5cm)  Follows Dr. Ramirez. Given pending surgery she needs to some off sirolimus--therefore on 5/27 patient started tacrolimus 0.5mg BID with plan to recheck labs in 3 days, Hepatology to determine when to stop Sirolimus. Also noted possible IPMN in the uncinate process on CT 5/28/25.   - Hepatology consult placed and discussed with them briefly, plan for:   -STOP sirolimus 1mg every day    -Continue recently started Tacrolimus 0.5mg BID, tacro trough level ordered for morning, goal 3-5    -Continue PTA prednisone 9mg daily   -Hepatology recommending to not proceed with surgery at this time (unless emergent) since generally patient should be off Sirolimus for 2 weeks prior to procedures due to risk for poor wound healing.   - Continue PTA Ursodiol 250mg BID     T2DM  Last A1C 6.8 4/14/25.   - Hold PTA Linagliptin 5mg daily for now   - Monitor BG before meals and at bedtime   - Low sliding scale insulin available if needed    Mild RASHIDA  Nocturnal Hypoxemia  Chronic. Sleep study in 2018 showing mild RASHIDA with recommendation to start CPAP. It does not appear that there was further follow up and not using CPAP.   - Continuous oximetry ordered     HFpEF w/ Small R Pleural Effusion  Paroxysmal Atrial Fibrillation  Last TTE 3/10/25 EF 60-65%.   - Hold PTA Eliquis given surgical  plans. Resume at surgery team discretion.   - Continue PTA Metoprolol Succinate 25mg daily   - CXR pending   - Clinically appears hypovolemic on exam with dry mucus membranes, skin tenting, and DERREK    Hypothyroidism  Hx thyroidectomy for papillary thyroid carcinoma. Last TSH 1.72 4/4/25.   - Continue PTA Synthroid 175mcg daily    HTN   - Hold PTA Norvasc 5mg daily, Hydralazine 50mg TID in the immediate pre-op setting   - Continue PTA Metoprolol Succinate 25mg every morning    HLD   - Continue PTA Zetia 10mg bedtime   - Hold Repatha injection    Chronic Anemia  Baseline hemoglobin variable but generally 9-11 range on recent checks. Hemoglobin 9.9 at admission.   - Daily CBC   - Continue PTA iron supplement and folic acid supplement    Thrush   - Nystatin swish and swallow started 5/24/25 --> present    Chronic Shoulder Pain  Chronic Low Back Pain   - Continue PTA Tylenol 975mg BID, Diclofenac gel QID, Gabapentin 300mg bedtime    Chronic Hypocalcemia  Ca 7.3, albumin 3.2 at admission.   - Continue PTA Calcitriol 0.25mcg daily    Depression   - Continue PTA Zoloft 50mg daily    L Cheek SCC s/p MOHS 4/8/25  Skin Lesion  Tumor debulk with perineural invasion w/ pending Tumor Board for consideration of radiation therapy. Recent SCC with S/p MOHs procedure to left face.   - Follow up with dermatology regarding mass noted to left anterior forearm. Dermatology requested for PET scan which was scheduled for 5/29/25 but will need to be RESCHEDULED at discharge since she ws unable to have it due to surgical admission.    Generalized Deconditioning   - PT, OT, social work consults   - Fall precautions          Diet: Clear Liquid Diet  NPO for Procedure/Surgery per Anesthesia Guidelines Except for: Meds; Clear liquids before procedure/surgery: NO clear liquids before procedure/surgery  DVT Prophylaxis: Pneumatic Compression Devices  Ron Catheter: Not present  Lines: None     Cardiac Monitoring: None  Code Status: Full  "Code    Clinically Significant Risk Factors Present on Admission          # Hypochloremia: Lowest Cl = 97 mmol/L in last 2 days, will monitor as appropriate  # Hypocalcemia: Lowest Ca = 7.3 mg/dL in last 2 days, will monitor and replace as appropriate    # Anion Gap Metabolic Acidosis: Highest Anion Gap = 19 mmol/L in last 2 days, will monitor and treat as appropriate  # Hypoalbuminemia: Lowest albumin = 3.2 g/dL at 5/28/2025  1:43 PM, will monitor as appropriate    # Drug Induced Coagulation Defect: home medication list includes an anticoagulant medication   # Acute Kidney Injury, unspecified: based on a >150% or 0.3 mg/dL increase in last creatinine compared to past 90 day average, will monitor renal function  # Hypertension: Noted on problem list      # Anemia: based on hgb <11      # DMII: A1C = 6.8 % (Ref range: <5.7 %) within past 6 months    # Overweight: Estimated body mass index is 28.51 kg/m  as calculated from the following:    Height as of this encounter: 1.702 m (5' 7\").    Weight as of this encounter: 82.6 kg (182 lb).         # Financial/Environmental Concerns:           Disposition Plan     Medically Ready for Discharge: Anticipated in 2-4 Days         The patient's care was discussed with the Attending Physician, Dr. Antunez.    Jah Bettencourt PA-C  Hospitalist Service, [place]  Securely message with [employer] (more info)  Text page via [facility] Paging/Directory   See signed in provider for up to date coverage information    ______________________________________________________________________    Chief Complaint   Abdominal cramping, diarrhea    History is obtained from the patient and EMR    History of Present Illness   Luz Thompson is a 75 year old female patient with a past medical history significant for primary biliary cirrhosis s/p liver transplant 2002, paroxysmal atrial fibrillation on Eliquis w/ loop recorder in place, HFpEF (TTE " "3/9/25 EF 60-65%), RASHIDA, DVT in distant past, T2DM, lymphedema, HTN, HLD, CKD3, CVA distant past without residual deficits, rectal perforation w/ retroflexion on colonoscopy treated w/ 3 clips 8/2017, slow transit constipation, Sjogren's syndrome, anxiety, glaucoma, anemia, chronic back pain, osteoporosis, [place] fever 2016/2017 on Voriconazole until 6/2024 (Fluconazole caused severe rash), EBV viremia s/p rituxan 2016, appendectomy, cholecystectomy, hypothyroidism after thyroidectomy 2010 (papillary thyroid carcinoma), chronic low back pain and shoulder pain, depression, 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    Sudden onset today of severe abdominal cramping that made patient feel lightheaded. She had several episodes of diarrhea. Her symptoms are significantly improved after 1L NS bolus and IV Dilaudid 0.2mg x 1 dose in ED; note that the Dilaudid caused lip \"numbness\" and thus she received 12.5mg IV Benadryl with improvement. She has otherwise been in her normals state of health. She has some chronic shortness of breath with activity that is unchanged. No fevers, chills, chest pain/pressure, shortness of breath at rest, nausea, vomiting, urinary complaints.    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       Past Surgical History   Past Surgical History:   Procedure Laterality Date    APPENDECTOMY  1961    CATARACT IOL, RT/LT      RE12/19/2013, LE12/10/2013 - Toric lenses    CHOLECYSTECTOMY  1991    COLONOSCOPY  03/10/2014    Procedure: COLONOSCOPY;;  Surgeon: Gentry Ramirez MD;  Location:  GI    CYSTOSCOPY      ear drum repair      ENDOBRONCHIAL ULTRASOUND FLEXIBLE N/A 09/29/2017    Procedure: ENDOBRONCHIAL ULTRASOUND FLEXIBLE;  Flexible Bronchoscopy, Endobronchial Ultrasound, Transbronchial Needle Aspiration ;  Surgeon: Eden Clinton MD;  Location:  OR    ENDOSCOPIC RETROGRADE CHOLANGIOPANCREATOGRAM  09/19/2013    Procedure: ENDOSCOPIC RETROGRADE CHOLANGIOPANCREATOGRAM;  Endoscopic Retrograde Cholangiopancreatogram with single balloon enteroscopy, ballon sweep of bile duct;  Surgeon: Brett Membreno MD;  Location:  OR     KNEE SCOPE,MED/LAT MENISECTOMY Right 08/10/2012    partial medial menisectomy only    KNEE SURGERY   1966    R knee    PICC INSERTION  09/18/2013    4fr SL PASV PICC, 40cm (1cm external) in the R basilic vein w/ tip in the low SVC    PICC INSERTION  02/21/2014    5 fr DL BioFlo Navilyst PICC, 46 cm (3 cm external) in the L basilic vein w/ tip in the SVC RA junction.    THYROIDECTOMY  03/2010    TRANSPLANT LIVER RECIPIENT LIVING UNRELATED  05/2002       Prior to Admission Medications   Prior to Admission Medications   Prescriptions Last Dose Informant Patient Reported? Taking?   Continuous Glucose Sensor (FREESTYLE NINO 2 SENSOR) MISC  Self, Other No No   Sig: Change every 14 days.   D-MANNOSE PO  Self, Other Yes No   Sig: Take 1 Scoop by mouth 2 times daily.   EPINEPHrine (ANY BX GENERIC EQUIV) 0.3 MG/0.3ML injection 2-pack  Self, Other No No   Sig: Inject 0.3 mLs (0.3 mg) into the muscle as needed for anaphylaxis. May repeat one time in 5-15 minutes if response to initial dose is inadequate.   Ferrous Sulfate 324 MG TBEC 5/27/2025 Evening Self, Other Yes Yes   Sig: Take 1 tablet by mouth daily.   Glucagon (GVOKE HYPOPEN) 1 MG/0.2ML pen   No No   Sig: Inject the contents of 1 device under the skin into lower abdomen, outer thigh, or outer upper arm as needed for hypoglycemia. If no response after 15 minutes, additional 1 mg dose from a new device may be injected while waiting for emergency assistance.   Multiple Vitamins-Minerals (PRESERVISION AREDS 2) CAPS 5/28/2025 Morning Self, Other No Yes   Sig: Take 1 capsule by mouth 2 times daily   NONFORMULARY   Yes No   Sig: Apply 1 Application topically daily as needed. Momma Bear Nerve Lotion - pt uses on feet   Nutrisource Fiber PO packet  Self, Other Yes No   Sig: Take 1 packet by mouth daily. Benefiber   acetaminophen (TYLENOL) 500 MG tablet 5/28/2025 Morning  Yes Yes   Sig: Take 1,000 mg by mouth 2 times daily. During 4/16/25 med recc pt states take BID everyday   albuterol (PROAIR HFA/PROVENTIL HFA/VENTOLIN HFA) 108 (90 Base) MCG/ACT inhaler   No No   Sig: Inhale  2 puffs into the lungs every 4 hours as needed for shortness of breath, wheezing or cough.   amLODIPine (NORVASC) 5 MG tablet 5/28/2025 Morning  No Yes   Sig: Take 1 tablet (5 mg) by mouth daily. HOLD if SBP<100   apixaban ANTICOAGULANT (ELIQUIS) 2.5 MG tablet 5/28/2025 Morning  No Yes   Sig: Take 1 tablet (2.5 mg) by mouth 2 times daily.   calcitRIOL (ROCALTROL) 0.25 MCG capsule 5/28/2025 Morning Self, Other No Yes   Sig: Take 1 capsule (0.25 mcg) by mouth daily.   calcium carbonate (TUMS) 500 MG chewable tablet 5/28/2025 Noon  No Yes   Sig: Take 2 tablets (1,000 mg) by mouth every 4 hours as needed for heartburn.   diclofenac (VOLTAREN) 1 % topical gel 5/28/2025 Morning  No Yes   Sig: Apply 4 g topically 4 times daily.   diphenhydrAMINE (BENADRYL) 25 MG tablet 5/27/2025 Morning  Yes Yes   Sig: Take 25 mg by mouth every 6 hours as needed for itching or allergies.   estradiol (ESTRACE) 0.1 MG/GM vaginal cream Past Week Self, Other Yes Yes   Sig: Place vaginally three times a week.   evolocumab (REPATHA SURECLICK) 140 MG/ML prefilled autoinjector  Self, Other No No   Sig: Inject 1 mL (140 mg) subcutaneously every 14 days. . Please have fasting labs drawn for further refills.   ezetimibe (ZETIA) 10 MG tablet 5/27/2025 Evening Self, Other No Yes   Sig: Take 1 tablet (10 mg) by mouth daily.   folic acid (FOLVITE) 1 MG tablet 5/27/2025 Evening Self, Other No Yes   Sig: TAKE 1 TABLET BY MOUTH DAILY   gabapentin (NEURONTIN) 250 MG/5ML solution 5/27/2025 Bedtime Self, Other No Yes   Sig: Take 6 mLs (300 mg) by mouth at bedtime   glucose (BD GLUCOSE) 5 g chewable tablet  Self, Other No No   Sig: Take 2 tablets (10 g) by mouth as needed (low blood sugar)   glucose 40 % (400 mg/mL) gel   No No   Sig: Take 15-30 g by mouth every 15 minutes as needed for low blood sugar.   hydrALAZINE (APRESOLINE) 50 MG tablet 5/28/2025 Morning  No Yes   Sig: Take 1 tablet (50 mg) by mouth 3 times daily. hold if SBP <100mmHg.   ketoconazole  (NIZORAL) 2 % external shampoo 5/28/2025  No Yes   Sig: Apply topically twice a week. Lather in the shower, wait 3-5 minutes before rinsing.   Patient taking differently: Apply topically twice a week. On Wed and Sat  Lather in the shower, wait 3-5 minutes before rinsing.   levothyroxine (SYNTHROID/LEVOTHROID) 175 MCG tablet 5/28/2025 Morning Self, Other No Yes   Sig: Take 1 tablet (175 mcg) by mouth daily.   linagliptin (TRADJENTA) 5 MG TABS tablet 5/28/2025 Morning Self, Other No Yes   Sig: Take 1 tablet (5 mg) by mouth daily.   magnesium citrate 1.745 GM/30ML solution   No No   Sig: Drink 1 bottle at 8pm the night before surgery   metoprolol succinate ER (TOPROL XL) 25 MG 24 hr tablet 5/28/2025 Morning  Yes Yes   Sig: Take 25 mg by mouth daily. HOLD if SBP<100 and/or HR<55   metroNIDAZOLE (FLAGYL) 500 MG tablet   No No   Sig: Take 1 tablet (500 mg) by mouth every 6 hours. At 8:00 am, 2:00 pm, 8:00 pm the day prior to your surgery with neomycin and zofran.   neomycin (MYCIFRADIN) 500 MG tablet   No No   Sig: Take 2 tablets (1,000 mg) by mouth every 6 hours. At 8:00 am, 2:00 pm, 8:00 pm the day prior to your surgery with flagyl and zofran.   nystatin (MYCOSTATIN) 494648 UNIT/ML suspension 5/28/2025 Morning  Yes Yes   Sig: Take 1,000,000 Units by mouth 4 times daily.   omega-3 acid ethyl esters (LOVAZA) 1 g capsule 5/27/2025 Evening  No Yes   Sig: Take 1 capsule (1 g) by mouth 2 times daily.   ondansetron (ZOFRAN ODT) 4 MG ODT tab   No No   Sig: Take 1 tablet (4 mg) by mouth every 6 hours as needed.   ondansetron (ZOFRAN) 4 MG tablet   No No   Sig: Take 1 tablet (4 mg) by mouth every 6 hours. At 8:00 am, 2:00 pm, 8:00 pm the day prior to your surgery with neomycin and flagyl.   oxymetazoline (AFRIN) 0.05 % nasal spray   No No   Sig: Spray 0.2 mLs (2 sprays) into both nostrils 2 times daily as needed for congestion.   polyethylene glycol (MIRALAX) 17 GM/Dose powder   No No   Sig: Please take 238 grams mixed with 64 oz  "of Gatorade at 4pm the night before surgery   predniSONE (DELTASONE) 1 MG tablet 2025 Morning  No Yes   Sig: Take 4 tablets (4 mg) by mouth daily. Take 4mg (1mg tablets x4) and Take with 5mg tablet to equal 9mg daily   predniSONE (DELTASONE) 5 MG tablet 2025 Morning  No Yes   Sig: Take 1 tablet (5 mg) by mouth daily. Take with 5mg tablet with 4mg (1mg tablets x4) to equal 9mg daily   sertraline (ZOLOFT) 50 MG tablet 2025 Morning  No Yes   Sig: Take 1 tablet (50 mg) by mouth daily.   sirolimus (GENERIC EQUIVALENT) 1 MG tablet 2025 Evening Self, Other No Yes   Sig: Take 1 tablet (1 mg) by mouth daily.   tacrolimus (GENERIC EQUIVALENT) 0.5 MG capsule 2025 Morning  No Yes   Sig: Take 1 capsule (0.5 mg) by mouth every 12 hours.   triamcinolone (KENALOG) 0.1 % external ointment   No No   Sig: Apply topically 2 times daily. Use 2 weeks or less.   ursodiol (ACTIGALL) 250 MG tablet 2025 Morning Self, Other No Yes   Sig: Take 1 tablet (250 mg) by mouth 2 times daily.      Facility-Administered Medications: None        Review of Systems    The 10 point Review of Systems is negative other than noted in the HPI or here.    Social History   I have reviewed this patient's social history and updated it with pertinent information if needed.  Social History     Tobacco Use    Smoking status: Former     Current packs/day: 0.00     Average packs/day: 1 pack/day for 18.0 years (18.0 ttl pk-yrs)     Types: Cigarettes     Start date: 1967     Quit date: 1985     Years since quittin.1    Smokeless tobacco: Never   Vaping Use    Vaping status: Never Used   Substance Use Topics    Alcohol use: Yes     Alcohol/week: 0.0 standard drinks of alcohol     Comment: rare - \"I toast at weddings\"    Drug use: No         Family History   I have reviewed this patient's family history and updated it with pertinent information if needed.  Family History   Problem Relation Age of Onset    Hypertension Mother     " Endometrial Cancer Mother     Hyperlipidemia Mother     Prostate Cancer Father     Macular Degeneration Father     Cancer - colorectal Maternal Grandmother         in her 80's, has surgery and removal of part of kidney,  at age 98    Heart Disease Maternal Grandfather          at 98    Glaucoma Maternal Grandfather     Cerebrovascular Disease Paternal Grandmother         in her 80's    Hypertension Paternal Grandmother     Heart Disease Paternal Grandfather         MI    Alzheimer Disease Paternal Grandfather     Allergies Son     Neurologic Disorder Daughter         Migraines    Breast Cancer Other     Anesthesia Reaction No family hx of     Crohn's Disease No family hx of     Ulcerative Colitis No family hx of     Melanoma No family hx of     Skin Cancer No family hx of          Allergies   Allergies   Allergen Reactions    Fluconazole Hives and Itching     Full body hives  **Intradermal skin testing negative**  [See intradermal skin testing results from 2019]    Mycophenolate Diarrhea and Nausea and Vomiting     Patient stated it was chronic and lasted months      Penicillins Anaphylaxis, Hives, Itching and Rash     **Intradermal skin testing negative**  [See intradermal skin testing results from 2019]      Simvastatin Muscle Pain (Myalgia)     severe  Other reaction(s): Myalgia caused by statin    Methotrexate Other (See Comments)     Other reaction(s): Sore  Sores in mouth, esophagus, and stomach.       Morphine And Codeine Itching and Other (See Comments)     Psych disturbance  Other reaction(s): Confusion, Mood alteration    Quinolones Anxiety, Dizziness, Headache, Other (See Comments), Palpitations and Unknown     Other reaction(s): Hyperactive behavior, Lightheadedness, Mood alteration    Dizzy, light headed    Dizziness, shaky, and jumpy    Capsules, Empty Gelatin [Gelatin]     Carvedilol Diarrhea     Per pt - severe diarrhea and LE swelling  + tolerating Toprol    Lansoprazole Diarrhea     Strawberry Extract     Azithromycin Itching     [See intradermal skin testing results from 8/2/2019]    Bactrim [Sulfamethoxazole-Trimethoprim] Other (See Comments)     Numb mouth, tingling lips (treated with anti-histamines)    Cephalosporins Itching     [See intradermal skin testing results from 8/2/2019]    Ciprofloxacin Hcl Other (See Comments) and Dizziness     Insomnia, mood lability, Irregular heart beat         Doxycycline Itching and Unknown     [See intradermal skin testing results from 8/2/2019]    Lisinopril Cough    Omeprazole Itching    Tolectin [Nsaids] Rash    Tolmetin Rash and Itching    Tramadol Rash, Hives and Itching        Physical Exam   Vital Signs: Temp: 98.4  F (36.9  C) Temp src: Oral BP: 112/50 Pulse: 87   Resp: 18 SpO2: 95 % O2 Device: None (Room air)    Weight: 182 lbs 0 oz    GENERAL: Alert and oriented x 3. Well nourished, well developed.  No acute distress.    HEENT: Normocephalic, atraumatic. Anicteric sclera. Mucous membranes dry.   CV: RRR. S1, S2. No murmurs appreciated.   RESPIRATORY: Effort normal on room air. Lungs CTAB with no wheezing, rales, or rhonchi.   GI: Abdomen soft and non distended, bowel sounds present x all 4 quadrants. Mild tenderness with deep palpation LLQ. No rigidity, rebound, or guarding.   NEUROLOGICAL: No focal deficits. Follows commands.  Strength 5/5 in upper and lower extremities.   MUSCULOSKELETAL: No joint swelling or tenderness. Moves all extremities.   EXTREMITIES: No gross deformities. No peripheral edema.   SKIN: Grossly warm, dry, and intact. No jaundice. No rashes. Skin tenting present on hands.    Medical Decision Making       82 MINUTES SPENT BY ME on the date of service doing chart review, history, exam, documentation & further activities per the note.      Data   Imaging results reviewed over the past 24 hrs:   Recent Results (from the past 24 hours)   CT Abdomen Pelvis w/o Contrast    Narrative    EXAM: CT ABDOMEN PELVIS W/O  CONTRAST  LOCATION: [facility]  DATE: 5/28/2025    INDICATION: abd pain , diarrhea, known abscess  COMPARISON: 5/22/2025  TECHNIQUE: CT scan of the abdomen and pelvis was performed without IV contrast. Multiplanar reformats were obtained. Dose reduction techniques were used.  CONTRAST: None.    FINDINGS:   LOWER CHEST: Small right pleural effusion, decreased compared to prior. Left pleural effusion has resolved. At least moderate coronary artery calcifications. Unchanged groundglass and tree-in-bud opacities in the right lower lobe, with some associated   calcifications.     HEPATOBILIARY: Prior hepatic transplant. Pneumobilia and an area of biliary dilation in the segment 5, both unchanged.    PANCREAS: Fatty infiltration without ductal dilatation. 1.5 cm possible IPMN in the uncinate process (5/91) is unchanged.    SPLEEN: Unremarkable    ADRENAL GLANDS: Unremarkable    KIDNEYS/BLADDER: No significant mass, stones, or hydronephrosis. There are simple or benign cysts. No follow up is needed. Punctate nonobstructing intrarenal calculus in the left kidney.    BOWEL: Multiple colonic diverticula. No acute inflammation or obstruction. Decreasing size of pericolonic fluid collection, now 1.4 cm, previously 1.6 cm, prior to that 2.5 cm.    LYMPH NODES: No adenopathy    VASCULATURE: Atherosclerotic disease without abdominal aortic aneurysm.    PELVIC ORGANS: Atrophic uterus.     MUSCULOSKELETAL: Diffuse osteopenia. Multilevel spondylosis. There are several small to moderate-sized fat-containing ventral hernias.      Impression    IMPRESSION:   1.  No acute findings in the abdomen or pelvis.  2.  Decreasing size of pericolonic fluid collection.  3.  Small right pleural effusion, decreased compared to prior. Left pleural effusion has resolved.  4.  Unchanged groundglass and tree-in-bud opacities in the right lower lobe.         Recent Labs   Lab 05/28/25  1825 05/28/25  1343  05/27/25  1136   WBC  --  9.3 11.3*   HGB  --  9.9* 11.3*   MCV  --  86 90   PLT  --  291 370   NA  --  136 136   POTASSIUM  --  4.0 4.6   CHLORIDE  --  97* 97*   CO2  --  21* 20*   BUN  --  117.4* 103.0*   CR  --  2.99* 2.44*   ANIONGAP  --  18* 19*   KEILY  --  7.3* 8.2*   * 112* 98   ALBUMIN  --  3.2* 3.6   PROTTOTAL  --  5.8* 6.5   BILITOTAL  --  0.4 0.4   ALKPHOS  --  160* 178*   ALT  --  27 28   AST  --  23 26

## 2025-05-28 NOTE — PROGRESS NOTES
Brief CRS Note    Greatly appreciate Emergency room and Hospitalist staff management and care in this medically complex patient.     - Appreciate admission to medicine due to multiple co-morbidities  - From CRS perspective, we recommend surgical intervention.  Scheduled for 8:55am at [facility].  Please arrive to [facility] by:  6:55am.   - would greatly appreciate Medicine management due to multiple co-morbidities.   - would appreciate ID consult to continue PTA Abx and for kayla-op Abx recommendations  - appreciate Hepatology for the transition from Sirolimus to Tacrolimus  - appreciate [facility] to [facility] for an ostomy (have contacted [facility])  - ok for [facility] now but NPO at [facility]  - appreciate one single dose of miralax (will avoid a full pre-op bowel prep as pt is having diarrhea)  - HOLD anticoagulation  - Discussed with Dr. Estes and Dr. Mclean      Last dose of Eliquis was 5/28/2025 at 08:30am.      Discussed the above with attending, Dr. Campbell

## 2025-05-28 NOTE — ED NOTES
Bed: ED04  Expected date: 5/28/25  Expected time: 12:30 PM  Means of arrival: Ambulance  Comments:  SPF [age]yo female, weakness, bp94/50, 12 lead nsr

## 2025-05-29 ENCOUNTER — ANESTHESIA EVENT (OUTPATIENT)
Dept: SURGERY | Facility: CLINIC | Age: 75
End: 2025-05-29
Payer: MEDICARE

## 2025-05-29 ENCOUNTER — APPOINTMENT (OUTPATIENT)
Dept: CT IMAGING | Facility: CLINIC | Age: 75
End: 2025-05-29
Attending: NURSE PRACTITIONER
Payer: MEDICARE

## 2025-05-29 ENCOUNTER — ANESTHESIA (OUTPATIENT)
Dept: SURGERY | Facility: CLINIC | Age: 75
End: 2025-05-29
Payer: MEDICARE

## 2025-05-29 VITALS
HEART RATE: 75 BPM | WEIGHT: 182 LBS | BODY MASS INDEX: 28.56 KG/M2 | DIASTOLIC BLOOD PRESSURE: 73 MMHG | SYSTOLIC BLOOD PRESSURE: 102 MMHG | RESPIRATION RATE: 15 BRPM | HEIGHT: 67 IN | TEMPERATURE: 97 F | OXYGEN SATURATION: 100 %

## 2025-05-29 PROBLEM — Z71.89 OSTOMY NURSE CONSULTATION: Status: ACTIVE | Noted: 2025-05-29

## 2025-05-29 LAB
ABO + RH BLD: NORMAL
ACB COMPLEX DNA BLD POS QL NAA+NON-PROBE: NOT DETECTED
ALBUMIN SERPL BCG-MCNC: 2.4 G/DL (ref 3.5–5.2)
ALP SERPL-CCNC: 196 U/L (ref 40–150)
ALT SERPL W P-5'-P-CCNC: 53 U/L (ref 0–50)
ANION GAP SERPL CALCULATED.3IONS-SCNC: 12 MMOL/L (ref 7–15)
APTT PPP: 35 SECONDS (ref 22–38)
AST SERPL W P-5'-P-CCNC: 68 U/L (ref 0–45)
ATRIAL RATE - MUSE: 62 BPM
ATRIAL RATE - MUSE: 78 BPM
ATRIAL RATE - MUSE: NORMAL BPM
B FRAGILIS DNA BLD POS QL NAA+NON-PROBE: NOT DETECTED
BACTERIA SPEC CULT: ABNORMAL
BACTERIA UR CULT: ABNORMAL
BACTERIA UR CULT: ABNORMAL
BACTERIA UR CULT: NORMAL
BASE EXCESS BLDA CALC-SCNC: -7.5 MMOL/L (ref -3–3)
BILIRUB SERPL-MCNC: 0.2 MG/DL
BLACTX-M ISLT/SPM QL: NOT DETECTED
BLAIMP ISLT/SPM QL: NOT DETECTED
BLAKPC ISLT/SPM QL: NOT DETECTED
BLAOXA-48-LIKE ISLT/SPM QL: NOT DETECTED
BLAVIM ISLT/SPM QL: NOT DETECTED
BLD GP AB SCN SERPL QL: NEGATIVE
BLD PROD TYP BPU: NORMAL
BLD PROD TYP BPU: NORMAL
BLOOD COMPONENT TYPE: NORMAL
BLOOD COMPONENT TYPE: NORMAL
BUN SERPL-MCNC: 85.2 MG/DL (ref 8–23)
C ALBICANS DNA BLD POS QL NAA+NON-PROBE: NOT DETECTED
C AURIS DNA BLD POS QL NAA+NON-PROBE: NOT DETECTED
C GATTII+NEOFOR DNA BLD POS QL NAA+N-PRB: NOT DETECTED
C GLABRATA DNA BLD POS QL NAA+NON-PROBE: NOT DETECTED
C KRUSEI DNA BLD POS QL NAA+NON-PROBE: NOT DETECTED
C PARAP DNA BLD POS QL NAA+NON-PROBE: NOT DETECTED
C TROPICLS DNA BLD POS QL NAA+NON-PROBE: NOT DETECTED
CA-I BLD-MCNC: 3.9 MG/DL (ref 4.4–5.2)
CALCIUM SERPL-MCNC: 8 MG/DL (ref 8.8–10.4)
CHLORIDE SERPL-SCNC: 110 MMOL/L (ref 98–107)
CODING SYSTEM: NORMAL
CODING SYSTEM: NORMAL
COLISTIN RES MCR-1 ISLT/SPM QL: NOT DETECTED
CREAT SERPL-MCNC: 2.37 MG/DL (ref 0.51–0.95)
CROSSMATCH: NORMAL
CROSSMATCH: NORMAL
CRP SERPL-MCNC: 65.5 MG/L
DIASTOLIC BLOOD PRESSURE - MUSE: NORMAL MMHG
E CLOAC COMP DNA BLD POS NAA+NON-PROBE: NOT DETECTED
E COLI DNA BLD POS QL NAA+NON-PROBE: NOT DETECTED
E FAECALIS DNA BLD POS QL NAA+NON-PROBE: NOT DETECTED
E FAECIUM DNA BLD POS QL NAA+NON-PROBE: NOT DETECTED
EGFRCR SERPLBLD CKD-EPI 2021: 21 ML/MIN/1.73M2
ERYTHROCYTE [DISTWIDTH] IN BLOOD BY AUTOMATED COUNT: 18.3 % (ref 10–15)
ERYTHROCYTE [DISTWIDTH] IN BLOOD BY AUTOMATED COUNT: 18.6 % (ref 10–15)
EST. AVERAGE GLUCOSE BLD GHB EST-MCNC: 134 MG/DL
GLUCOSE BLD-MCNC: 191 MG/DL (ref 70–99)
GLUCOSE BLDC GLUCOMTR-MCNC: 137 MG/DL (ref 70–99)
GLUCOSE BLDC GLUCOMTR-MCNC: 140 MG/DL (ref 70–99)
GLUCOSE BLDC GLUCOMTR-MCNC: 143 MG/DL (ref 70–99)
GLUCOSE BLDC GLUCOMTR-MCNC: 148 MG/DL (ref 70–99)
GLUCOSE BLDC GLUCOMTR-MCNC: 159 MG/DL (ref 70–99)
GLUCOSE BLDC GLUCOMTR-MCNC: 163 MG/DL (ref 70–99)
GLUCOSE BLDC GLUCOMTR-MCNC: 169 MG/DL (ref 70–99)
GLUCOSE BLDC GLUCOMTR-MCNC: 230 MG/DL (ref 70–99)
GLUCOSE SERPL-MCNC: 174 MG/DL (ref 70–99)
GP B STREP DNA BLD POS QL NAA+NON-PROBE: NOT DETECTED
HAEM INFLU DNA BLD POS QL NAA+NON-PROBE: NOT DETECTED
HBA1C MFR BLD: 6.3 %
HCO3 BLDA-SCNC: 18 MMOL/L (ref 21–28)
HCO3 SERPL-SCNC: 18 MMOL/L (ref 22–29)
HCT VFR BLD AUTO: 23.6 % (ref 35–47)
HCT VFR BLD AUTO: 26.4 % (ref 35–47)
HGB BLD-MCNC: 7.6 G/DL (ref 11.7–15.7)
HGB BLD-MCNC: 8.5 G/DL (ref 11.7–15.7)
HGB BLD-MCNC: 8.5 G/DL (ref 11.7–15.7)
INR PPP: 1.49 (ref 0.85–1.15)
INTERPRETATION ECG - MUSE: NORMAL
ISSUE DATE AND TIME: NORMAL
ISSUE DATE AND TIME: NORMAL
K OXYTOCA DNA BLD POS QL NAA+NON-PROBE: NOT DETECTED
KLEBSIELLA SP DNA BLD POS QL NAA+NON-PRB: DETECTED
KLEBSIELLA SP DNA BLD POS QL NAA+NON-PRB: NOT DETECTED
L MONOCYTOG DNA BLD POS QL NAA+NON-PROBE: NOT DETECTED
LACTATE BLD-SCNC: 1 MMOL/L (ref 0.7–2)
LACTATE SERPL-SCNC: 3.7 MMOL/L (ref 0.7–2)
MAGNESIUM SERPL-MCNC: 1.8 MG/DL (ref 1.7–2.3)
MAGNESIUM SERPL-MCNC: 1.9 MG/DL (ref 1.7–2.3)
MCH RBC QN AUTO: 28 PG (ref 26.5–33)
MCH RBC QN AUTO: 28.4 PG (ref 26.5–33)
MCHC RBC AUTO-ENTMCNC: 32.2 G/DL (ref 31.5–36.5)
MCHC RBC AUTO-ENTMCNC: 32.2 G/DL (ref 31.5–36.5)
MCV RBC AUTO: 87 FL (ref 78–100)
MCV RBC AUTO: 88 FL (ref 78–100)
N MEN DNA BLD POS QL NAA+NON-PROBE: NOT DETECTED
NDM: NOT DETECTED
O2/TOTAL GAS SETTING VFR VENT: 53 %
OXYHGB MFR BLDA: 98 % (ref 92–100)
P AERUGINOSA DNA BLD POS NAA+NON-PROBE: NOT DETECTED
P AXIS - MUSE: 68 DEGREES
P AXIS - MUSE: 79 DEGREES
P AXIS - MUSE: NORMAL DEGREES
PCO2 BLDA: 35 MM HG (ref 35–45)
PH BLDA: 7.32 [PH] (ref 7.35–7.45)
PHOSPHATE SERPL-MCNC: 5.2 MG/DL (ref 2.5–4.5)
PLATELET # BLD AUTO: 200 10E3/UL (ref 150–450)
PLATELET # BLD AUTO: 215 10E3/UL (ref 150–450)
PO2 BLDA: 136 MM HG (ref 80–105)
POTASSIUM BLD-SCNC: 4.5 MMOL/L (ref 3.4–5.3)
POTASSIUM SERPL-SCNC: 4.7 MMOL/L (ref 3.4–5.3)
PR INTERVAL - MUSE: 216 MS
PR INTERVAL - MUSE: 232 MS
PR INTERVAL - MUSE: NORMAL MS
PROT SERPL-MCNC: 4.6 G/DL (ref 6.4–8.3)
PROTEUS SP DNA BLD POS QL NAA+NON-PROBE: NOT DETECTED
PROTHROMBIN TIME: 18.3 SECONDS (ref 11.8–14.8)
QRS DURATION - MUSE: 76 MS
QRS DURATION - MUSE: 80 MS
QRS DURATION - MUSE: 84 MS
QT - MUSE: 394 MS
QT - MUSE: 402 MS
QT - MUSE: 456 MS
QTC - MUSE: 323 MS
QTC - MUSE: 449 MS
QTC - MUSE: 462 MS
R AXIS - MUSE: 15 DEGREES
R AXIS - MUSE: 21 DEGREES
R AXIS - MUSE: 9 DEGREES
RBC # BLD AUTO: 2.68 10E6/UL (ref 3.8–5.2)
RBC # BLD AUTO: 3.04 10E6/UL (ref 3.8–5.2)
S AUREUS DNA BLD POS QL NAA+NON-PROBE: NOT DETECTED
S AUREUS+CONS DNA BLD POS NAA+NON-PROBE: NOT DETECTED
S EPIDERMIDIS DNA BLD POS QL NAA+NON-PRB: NOT DETECTED
S LUGDUNENSIS DNA BLD POS QL NAA+NON-PRB: NOT DETECTED
S MALTOPHILIA DNA BLD POS QL NAA+NON-PRB: NOT DETECTED
S MARCESCENS DNA BLD POS NAA+NON-PROBE: NOT DETECTED
S PNEUM DNA BLD POS QL NAA+NON-PROBE: NOT DETECTED
S PYO DNA BLD POS QL NAA+NON-PROBE: NOT DETECTED
SALMONELLA DNA BLD POS QL NAA+NON-PROBE: NOT DETECTED
SAO2 % BLDA: 99 % (ref 95–96)
SODIUM BLD-SCNC: 139 MMOL/L (ref 135–145)
SODIUM SERPL-SCNC: 140 MMOL/L (ref 135–145)
SPECIMEN EXP DATE BLD: NORMAL
STREPTOCOCCUS DNA BLD POS NAA+NON-PROBE: NOT DETECTED
SYSTOLIC BLOOD PRESSURE - MUSE: NORMAL MMHG
T AXIS - MUSE: 13 DEGREES
T AXIS - MUSE: 26 DEGREES
T AXIS - MUSE: 35 DEGREES
TACROLIMUS BLD-MCNC: <1 UG/L (ref 5–15)
TME LAST DOSE: ABNORMAL H
TME LAST DOSE: ABNORMAL H
UNIT ABO/RH: NORMAL
UNIT ABO/RH: NORMAL
UNIT NUMBER: NORMAL
UNIT NUMBER: NORMAL
UNIT STATUS: NORMAL
UNIT STATUS: NORMAL
UNIT TYPE ISBT: 5100
UNIT TYPE ISBT: 5100
VENTRICULAR RATE- MUSE: 39 BPM
VENTRICULAR RATE- MUSE: 62 BPM
VENTRICULAR RATE- MUSE: 78 BPM
WBC # BLD AUTO: 20.2 10E3/UL (ref 4–11)
WBC # BLD AUTO: 9.1 10E3/UL (ref 4–11)

## 2025-05-29 PROCEDURE — 0D1M0Z4 BYPASS DESCENDING COLON TO CUTANEOUS, OPEN APPROACH: ICD-10-PCS | Performed by: SURGERY

## 2025-05-29 PROCEDURE — 74176 CT ABD & PELVIS W/O CONTRAST: CPT

## 2025-05-29 PROCEDURE — 84132 ASSAY OF SERUM POTASSIUM: CPT

## 2025-05-29 PROCEDURE — 85730 THROMBOPLASTIN TIME PARTIAL: CPT | Performed by: PHYSICIAN ASSISTANT

## 2025-05-29 PROCEDURE — 85014 HEMATOCRIT: CPT | Performed by: PHYSICIAN ASSISTANT

## 2025-05-29 PROCEDURE — 258N000003 HC RX IP 258 OP 636: Performed by: NURSE ANESTHETIST, CERTIFIED REGISTERED

## 2025-05-29 PROCEDURE — 250N000009 HC RX 250: Performed by: NURSE ANESTHETIST, CERTIFIED REGISTERED

## 2025-05-29 PROCEDURE — 250N000009 HC RX 250: Performed by: INTERNAL MEDICINE

## 2025-05-29 PROCEDURE — 99223 1ST HOSP IP/OBS HIGH 75: CPT | Mod: 24 | Performed by: STUDENT IN AN ORGANIZED HEALTH CARE EDUCATION/TRAINING PROGRAM

## 2025-05-29 PROCEDURE — 710N000011 HC RECOVERY PHASE 1, LEVEL 3, PER MIN: Performed by: UROLOGY

## 2025-05-29 PROCEDURE — 86900 BLOOD TYPING SEROLOGIC ABO: CPT | Performed by: PHYSICIAN ASSISTANT

## 2025-05-29 PROCEDURE — 83735 ASSAY OF MAGNESIUM: CPT

## 2025-05-29 PROCEDURE — 258N000003 HC RX IP 258 OP 636: Performed by: INTERNAL MEDICINE

## 2025-05-29 PROCEDURE — 200N000002 HC R&B ICU UMMC

## 2025-05-29 PROCEDURE — 93010 ELECTROCARDIOGRAM REPORT: CPT | Performed by: INTERNAL MEDICINE

## 2025-05-29 PROCEDURE — 250N000011 HC RX IP 250 OP 636: Mod: JZ | Performed by: FAMILY MEDICINE

## 2025-05-29 PROCEDURE — 83605 ASSAY OF LACTIC ACID: CPT | Performed by: PHYSICIAN ASSISTANT

## 2025-05-29 PROCEDURE — 99222 1ST HOSP IP/OBS MODERATE 55: CPT | Performed by: OBSTETRICS & GYNECOLOGY

## 2025-05-29 PROCEDURE — 250N000025 HC SEVOFLURANE, PER MIN: Performed by: UROLOGY

## 2025-05-29 PROCEDURE — 258N000003 HC RX IP 258 OP 636: Performed by: FAMILY MEDICINE

## 2025-05-29 PROCEDURE — 0DBN0ZZ EXCISION OF SIGMOID COLON, OPEN APPROACH: ICD-10-PCS | Performed by: SURGERY

## 2025-05-29 PROCEDURE — 250N000013 HC RX MED GY IP 250 OP 250 PS 637: Performed by: PHYSICIAN ASSISTANT

## 2025-05-29 PROCEDURE — 250N000011 HC RX IP 250 OP 636: Performed by: INTERNAL MEDICINE

## 2025-05-29 PROCEDURE — 99291 CRITICAL CARE FIRST HOUR: CPT | Performed by: NURSE PRACTITIONER

## 2025-05-29 PROCEDURE — 93005 ELECTROCARDIOGRAM TRACING: CPT

## 2025-05-29 PROCEDURE — 360N000077 HC SURGERY LEVEL 4, PER MIN: Performed by: UROLOGY

## 2025-05-29 PROCEDURE — 370N000017 HC ANESTHESIA TECHNICAL FEE, PER MIN: Performed by: UROLOGY

## 2025-05-29 PROCEDURE — 74176 CT ABD & PELVIS W/O CONTRAST: CPT | Mod: 26 | Performed by: RADIOLOGY

## 2025-05-29 PROCEDURE — 250N000013 HC RX MED GY IP 250 OP 250 PS 637

## 2025-05-29 PROCEDURE — 258N000003 HC RX IP 258 OP 636: Performed by: PHYSICIAN ASSISTANT

## 2025-05-29 PROCEDURE — 36415 COLL VENOUS BLD VENIPUNCTURE: CPT | Performed by: PHYSICIAN ASSISTANT

## 2025-05-29 PROCEDURE — 99223 1ST HOSP IP/OBS HIGH 75: CPT | Mod: 24 | Performed by: INTERNAL MEDICINE

## 2025-05-29 PROCEDURE — 86140 C-REACTIVE PROTEIN: CPT | Performed by: PHYSICIAN ASSISTANT

## 2025-05-29 PROCEDURE — 88307 TISSUE EXAM BY PATHOLOGIST: CPT | Mod: 26 | Performed by: PATHOLOGY

## 2025-05-29 PROCEDURE — 258N000003 HC RX IP 258 OP 636: Performed by: NURSE PRACTITIONER

## 2025-05-29 PROCEDURE — 82962 GLUCOSE BLOOD TEST: CPT

## 2025-05-29 PROCEDURE — 86923 COMPATIBILITY TEST ELECTRIC: CPT

## 2025-05-29 PROCEDURE — 52005 CYSTO W/URTRL CATHJ: CPT | Performed by: UROLOGY

## 2025-05-29 PROCEDURE — 250N000011 HC RX IP 250 OP 636: Mod: JZ | Performed by: INTERNAL MEDICINE

## 2025-05-29 PROCEDURE — 83036 HEMOGLOBIN GLYCOSYLATED A1C: CPT

## 2025-05-29 PROCEDURE — G0545 PR INHRENT VISIT TO INPT/OBS W CNFRM/SUSPCT INFCT DIS BY INFCT DIS SPCIALST: HCPCS | Performed by: STUDENT IN AN ORGANIZED HEALTH CARE EDUCATION/TRAINING PROGRAM

## 2025-05-29 PROCEDURE — 84155 ASSAY OF PROTEIN SERUM: CPT

## 2025-05-29 PROCEDURE — 44143 PARTIAL REMOVAL OF COLON: CPT | Mod: 79 | Performed by: SURGERY

## 2025-05-29 PROCEDURE — 250N000011 HC RX IP 250 OP 636: Mod: JZ | Performed by: STUDENT IN AN ORGANIZED HEALTH CARE EDUCATION/TRAINING PROGRAM

## 2025-05-29 PROCEDURE — 88307 TISSUE EXAM BY PATHOLOGIST: CPT | Mod: TC | Performed by: SURGERY

## 2025-05-29 PROCEDURE — 250N000009 HC RX 250: Performed by: NURSE PRACTITIONER

## 2025-05-29 PROCEDURE — 80197 ASSAY OF TACROLIMUS: CPT | Performed by: PHYSICIAN ASSISTANT

## 2025-05-29 PROCEDURE — 272N000001 HC OR GENERAL SUPPLY STERILE: Performed by: UROLOGY

## 2025-05-29 PROCEDURE — 84100 ASSAY OF PHOSPHORUS: CPT

## 2025-05-29 PROCEDURE — 250N000012 HC RX MED GY IP 250 OP 636 PS 637: Performed by: PHYSICIAN ASSISTANT

## 2025-05-29 PROCEDURE — 83735 ASSAY OF MAGNESIUM: CPT | Performed by: PHYSICIAN ASSISTANT

## 2025-05-29 PROCEDURE — G0463 HOSPITAL OUTPT CLINIC VISIT: HCPCS

## 2025-05-29 PROCEDURE — 0TJ98ZZ INSPECTION OF URETER, VIA NATURAL OR ARTIFICIAL OPENING ENDOSCOPIC: ICD-10-PCS | Performed by: SURGERY

## 2025-05-29 PROCEDURE — C1769 GUIDE WIRE: HCPCS | Performed by: UROLOGY

## 2025-05-29 PROCEDURE — 85018 HEMOGLOBIN: CPT

## 2025-05-29 PROCEDURE — P9040 RBC LEUKOREDUCED IRRADIATED: HCPCS

## 2025-05-29 PROCEDURE — 99291 CRITICAL CARE FIRST HOUR: CPT | Mod: 24 | Performed by: SURGERY

## 2025-05-29 PROCEDURE — 250N000011 HC RX IP 250 OP 636: Performed by: NURSE ANESTHETIST, CERTIFIED REGISTERED

## 2025-05-29 PROCEDURE — 250N000011 HC RX IP 250 OP 636: Performed by: PHYSICIAN ASSISTANT

## 2025-05-29 PROCEDURE — 85610 PROTHROMBIN TIME: CPT | Performed by: PHYSICIAN ASSISTANT

## 2025-05-29 RX ORDER — CALCIUM CHLORIDE 100 MG/ML
INJECTION INTRAVENOUS; INTRAVENTRICULAR PRN
Status: DISCONTINUED | OUTPATIENT
Start: 2025-05-29 | End: 2025-05-29

## 2025-05-29 RX ORDER — LIDOCAINE 4 G/G
2 PATCH TOPICAL
Status: DISCONTINUED | OUTPATIENT
Start: 2025-05-29 | End: 2025-06-10

## 2025-05-29 RX ORDER — LIDOCAINE 40 MG/G
CREAM TOPICAL
Status: CANCELLED | OUTPATIENT
Start: 2025-05-29

## 2025-05-29 RX ORDER — FENTANYL CITRATE 50 UG/ML
INJECTION, SOLUTION INTRAMUSCULAR; INTRAVENOUS PRN
Status: DISCONTINUED | OUTPATIENT
Start: 2025-05-29 | End: 2025-05-29

## 2025-05-29 RX ORDER — NALOXONE HYDROCHLORIDE 0.4 MG/ML
0.1 INJECTION, SOLUTION INTRAMUSCULAR; INTRAVENOUS; SUBCUTANEOUS
Status: CANCELLED | OUTPATIENT
Start: 2025-05-29

## 2025-05-29 RX ORDER — HYDROCORTISONE SODIUM SUCCINATE 100 MG/2ML
100 INJECTION INTRAMUSCULAR; INTRAVENOUS EVERY 6 HOURS
Status: DISCONTINUED | OUTPATIENT
Start: 2025-05-29 | End: 2025-06-01

## 2025-05-29 RX ORDER — HYDROMORPHONE HCL IN WATER/PF 6 MG/30 ML
0.4 PATIENT CONTROLLED ANALGESIA SYRINGE INTRAVENOUS EVERY 5 MIN PRN
Status: DISCONTINUED | OUTPATIENT
Start: 2025-05-29 | End: 2025-05-29 | Stop reason: HOSPADM

## 2025-05-29 RX ORDER — NALOXONE HYDROCHLORIDE 0.4 MG/ML
0.4 INJECTION, SOLUTION INTRAMUSCULAR; INTRAVENOUS; SUBCUTANEOUS
Status: DISCONTINUED | OUTPATIENT
Start: 2025-05-29 | End: 2025-06-12 | Stop reason: HOSPADM

## 2025-05-29 RX ORDER — ONDANSETRON 4 MG/1
4 TABLET, ORALLY DISINTEGRATING ORAL EVERY 30 MIN PRN
Status: CANCELLED | OUTPATIENT
Start: 2025-05-29

## 2025-05-29 RX ORDER — ROPIVACAINE IN 0.9% SOD CHL/PF 0.1 %
.01-.2 PLASTIC BAG, INJECTION (ML) EPIDURAL CONTINUOUS
Status: DISCONTINUED | OUTPATIENT
Start: 2025-05-29 | End: 2025-05-30

## 2025-05-29 RX ORDER — ONDANSETRON 2 MG/ML
4 INJECTION INTRAMUSCULAR; INTRAVENOUS EVERY 30 MIN PRN
Status: DISCONTINUED | OUTPATIENT
Start: 2025-05-29 | End: 2025-05-29 | Stop reason: HOSPADM

## 2025-05-29 RX ORDER — DEXAMETHASONE SODIUM PHOSPHATE 10 MG/ML
4 INJECTION, SOLUTION INTRAMUSCULAR; INTRAVENOUS
Status: CANCELLED | OUTPATIENT
Start: 2025-05-29

## 2025-05-29 RX ORDER — ONDANSETRON 2 MG/ML
INJECTION INTRAMUSCULAR; INTRAVENOUS PRN
Status: DISCONTINUED | OUTPATIENT
Start: 2025-05-29 | End: 2025-05-29

## 2025-05-29 RX ORDER — SODIUM CHLORIDE, SODIUM GLUCONATE, SODIUM ACETATE, POTASSIUM CHLORIDE AND MAGNESIUM CHLORIDE 526; 502; 368; 37; 30 MG/100ML; MG/100ML; MG/100ML; MG/100ML; MG/100ML
INJECTION, SOLUTION INTRAVENOUS CONTINUOUS PRN
Status: DISCONTINUED | OUTPATIENT
Start: 2025-05-29 | End: 2025-05-29

## 2025-05-29 RX ORDER — HYDROMORPHONE HCL IN WATER/PF 6 MG/30 ML
0.2 PATIENT CONTROLLED ANALGESIA SYRINGE INTRAVENOUS EVERY 5 MIN PRN
Status: DISCONTINUED | OUTPATIENT
Start: 2025-05-29 | End: 2025-05-29 | Stop reason: HOSPADM

## 2025-05-29 RX ORDER — SODIUM CHLORIDE, SODIUM LACTATE, POTASSIUM CHLORIDE, CALCIUM CHLORIDE 600; 310; 30; 20 MG/100ML; MG/100ML; MG/100ML; MG/100ML
INJECTION, SOLUTION INTRAVENOUS CONTINUOUS PRN
Status: DISCONTINUED | OUTPATIENT
Start: 2025-05-29 | End: 2025-05-29

## 2025-05-29 RX ORDER — OXYCODONE HYDROCHLORIDE 10 MG/1
10 TABLET ORAL
Refills: 0 | Status: CANCELLED | OUTPATIENT
Start: 2025-05-29

## 2025-05-29 RX ORDER — NALOXONE HYDROCHLORIDE 0.4 MG/ML
0.2 INJECTION, SOLUTION INTRAMUSCULAR; INTRAVENOUS; SUBCUTANEOUS
Status: DISCONTINUED | OUTPATIENT
Start: 2025-05-29 | End: 2025-06-12 | Stop reason: HOSPADM

## 2025-05-29 RX ORDER — PROPOFOL 10 MG/ML
INJECTION, EMULSION INTRAVENOUS PRN
Status: DISCONTINUED | OUTPATIENT
Start: 2025-05-29 | End: 2025-05-29

## 2025-05-29 RX ORDER — DEXAMETHASONE SODIUM PHOSPHATE 4 MG/ML
4 INJECTION, SOLUTION INTRA-ARTICULAR; INTRALESIONAL; INTRAMUSCULAR; INTRAVENOUS; SOFT TISSUE
Status: DISCONTINUED | OUTPATIENT
Start: 2025-05-29 | End: 2025-05-29 | Stop reason: HOSPADM

## 2025-05-29 RX ORDER — FENTANYL CITRATE 50 UG/ML
25 INJECTION, SOLUTION INTRAMUSCULAR; INTRAVENOUS
Status: DISCONTINUED | OUTPATIENT
Start: 2025-05-29 | End: 2025-05-30

## 2025-05-29 RX ORDER — NALOXONE HYDROCHLORIDE 0.4 MG/ML
0.1 INJECTION, SOLUTION INTRAMUSCULAR; INTRAVENOUS; SUBCUTANEOUS
Status: DISCONTINUED | OUTPATIENT
Start: 2025-05-29 | End: 2025-05-29 | Stop reason: HOSPADM

## 2025-05-29 RX ORDER — ATROPINE SULFATE 0.1 MG/ML
INJECTION INTRAVENOUS
Status: DISCONTINUED
Start: 2025-05-29 | End: 2025-05-30 | Stop reason: HOSPADM

## 2025-05-29 RX ORDER — FENTANYL CITRATE 50 UG/ML
25 INJECTION, SOLUTION INTRAMUSCULAR; INTRAVENOUS EVERY 5 MIN PRN
Status: DISCONTINUED | OUTPATIENT
Start: 2025-05-29 | End: 2025-05-29 | Stop reason: HOSPADM

## 2025-05-29 RX ORDER — ONDANSETRON 2 MG/ML
4 INJECTION INTRAMUSCULAR; INTRAVENOUS EVERY 30 MIN PRN
Status: CANCELLED | OUTPATIENT
Start: 2025-05-29

## 2025-05-29 RX ORDER — SODIUM CHLORIDE, SODIUM LACTATE, POTASSIUM CHLORIDE, CALCIUM CHLORIDE 600; 310; 30; 20 MG/100ML; MG/100ML; MG/100ML; MG/100ML
INJECTION, SOLUTION INTRAVENOUS CONTINUOUS
Status: CANCELLED | OUTPATIENT
Start: 2025-05-29

## 2025-05-29 RX ORDER — HYDROMORPHONE HCL IN WATER/PF 6 MG/30 ML
0.1 PATIENT CONTROLLED ANALGESIA SYRINGE INTRAVENOUS EVERY 4 HOURS PRN
Status: DISCONTINUED | OUTPATIENT
Start: 2025-05-29 | End: 2025-05-29

## 2025-05-29 RX ORDER — OXYCODONE HYDROCHLORIDE 5 MG/1
5 TABLET ORAL
Refills: 0 | Status: CANCELLED | OUTPATIENT
Start: 2025-05-29

## 2025-05-29 RX ORDER — LIDOCAINE HYDROCHLORIDE 20 MG/ML
INJECTION, SOLUTION INFILTRATION; PERINEURAL PRN
Status: DISCONTINUED | OUTPATIENT
Start: 2025-05-29 | End: 2025-05-29

## 2025-05-29 RX ORDER — FENTANYL CITRATE 50 UG/ML
50 INJECTION, SOLUTION INTRAMUSCULAR; INTRAVENOUS EVERY 5 MIN PRN
Status: DISCONTINUED | OUTPATIENT
Start: 2025-05-29 | End: 2025-05-29 | Stop reason: HOSPADM

## 2025-05-29 RX ORDER — OXYCODONE HYDROCHLORIDE 5 MG/1
5 TABLET ORAL
Refills: 0 | Status: DISCONTINUED | OUTPATIENT
Start: 2025-05-29 | End: 2025-05-30

## 2025-05-29 RX ORDER — SODIUM CHLORIDE, SODIUM LACTATE, POTASSIUM CHLORIDE, CALCIUM CHLORIDE 600; 310; 30; 20 MG/100ML; MG/100ML; MG/100ML; MG/100ML
INJECTION, SOLUTION INTRAVENOUS CONTINUOUS
Status: DISCONTINUED | OUTPATIENT
Start: 2025-05-29 | End: 2025-05-29 | Stop reason: HOSPADM

## 2025-05-29 RX ORDER — ONDANSETRON 4 MG/1
4 TABLET, ORALLY DISINTEGRATING ORAL EVERY 30 MIN PRN
Status: DISCONTINUED | OUTPATIENT
Start: 2025-05-29 | End: 2025-05-29 | Stop reason: HOSPADM

## 2025-05-29 RX ADMIN — FENTANYL CITRATE 25 MCG: 50 INJECTION, SOLUTION INTRAMUSCULAR; INTRAVENOUS at 20:00

## 2025-05-29 RX ADMIN — Medication 100 MG: at 11:40

## 2025-05-29 RX ADMIN — INSULIN ASPART 1 UNITS: 100 INJECTION, SOLUTION INTRAVENOUS; SUBCUTANEOUS at 16:48

## 2025-05-29 RX ADMIN — ACETAMINOPHEN 975 MG: 325 TABLET, FILM COATED ORAL at 20:11

## 2025-05-29 RX ADMIN — PROPOFOL 100 MG: 10 INJECTION, EMULSION INTRAVENOUS at 09:55

## 2025-05-29 RX ADMIN — SODIUM CHLORIDE, SODIUM LACTATE, POTASSIUM CHLORIDE, AND CALCIUM CHLORIDE: .6; .31; .03; .02 INJECTION, SOLUTION INTRAVENOUS at 11:47

## 2025-05-29 RX ADMIN — SODIUM CHLORIDE 1000 ML: 0.9 INJECTION, SOLUTION INTRAVENOUS at 06:01

## 2025-05-29 RX ADMIN — SODIUM CHLORIDE, SODIUM GLUCONATE, SODIUM ACETATE, POTASSIUM CHLORIDE AND MAGNESIUM CHLORIDE: 526; 502; 368; 37; 30 INJECTION, SOLUTION INTRAVENOUS at 10:04

## 2025-05-29 RX ADMIN — ERTAPENEM SODIUM 500 MG: 1 INJECTION, POWDER, LYOPHILIZED, FOR SOLUTION INTRAMUSCULAR; INTRAVENOUS at 19:38

## 2025-05-29 RX ADMIN — Medication 50 MG: at 09:56

## 2025-05-29 RX ADMIN — DEXMEDETOMIDINE HYDROCHLORIDE 12 MCG: 100 INJECTION, SOLUTION INTRAVENOUS at 12:31

## 2025-05-29 RX ADMIN — ONDANSETRON 4 MG: 2 INJECTION INTRAMUSCULAR; INTRAVENOUS at 11:36

## 2025-05-29 RX ADMIN — SODIUM CHLORIDE 1000 ML: 0.9 INJECTION, SOLUTION INTRAVENOUS at 04:29

## 2025-05-29 RX ADMIN — LIDOCAINE HYDROCHLORIDE 60 MG: 20 INJECTION, SOLUTION INFILTRATION; PERINEURAL at 09:52

## 2025-05-29 RX ADMIN — HYDROCORTISONE SODIUM SUCCINATE 100 MG: 100 INJECTION, POWDER, FOR SOLUTION INTRAMUSCULAR; INTRAVENOUS at 21:49

## 2025-05-29 RX ADMIN — VANCOMYCIN HYDROCHLORIDE 750 MG: 1 INJECTION, POWDER, LYOPHILIZED, FOR SOLUTION INTRAVENOUS at 01:55

## 2025-05-29 RX ADMIN — LIDOCAINE 2 PATCH: 4 PATCH TOPICAL at 16:46

## 2025-05-29 RX ADMIN — FENTANYL CITRATE 50 MCG: 50 INJECTION INTRAMUSCULAR; INTRAVENOUS at 09:50

## 2025-05-29 RX ADMIN — EZETIMIBE 10 MG: 10 TABLET ORAL at 21:49

## 2025-05-29 RX ADMIN — HYDROCORTISONE SODIUM SUCCINATE 100 MG: 100 INJECTION, POWDER, FOR SOLUTION INTRAMUSCULAR; INTRAVENOUS at 16:49

## 2025-05-29 RX ADMIN — NOREPINEPHRINE BITARTRATE 0.03 MCG/KG/MIN: 0.02 INJECTION, SOLUTION INTRAVENOUS at 05:39

## 2025-05-29 RX ADMIN — HYDROCORTISONE SODIUM SUCCINATE 100 MG: 100 INJECTION, POWDER, FOR SOLUTION INTRAMUSCULAR; INTRAVENOUS at 04:32

## 2025-05-29 RX ADMIN — Medication 100 MG: at 11:38

## 2025-05-29 RX ADMIN — FENTANYL CITRATE 50 MCG: 50 INJECTION INTRAMUSCULAR; INTRAVENOUS at 10:39

## 2025-05-29 RX ADMIN — SODIUM CHLORIDE, SODIUM LACTATE, POTASSIUM CHLORIDE, AND CALCIUM CHLORIDE: .6; .31; .03; .02 INJECTION, SOLUTION INTRAVENOUS at 09:43

## 2025-05-29 RX ADMIN — PHENYLEPHRINE HYDROCHLORIDE 100 MCG: 10 INJECTION INTRAVENOUS at 09:50

## 2025-05-29 RX ADMIN — Medication 1000 MCG: at 21:49

## 2025-05-29 RX ADMIN — PHENYLEPHRINE HYDROCHLORIDE 100 MCG: 10 INJECTION INTRAVENOUS at 09:54

## 2025-05-29 RX ADMIN — FENTANYL CITRATE 25 MCG: 50 INJECTION, SOLUTION INTRAMUSCULAR; INTRAVENOUS at 22:06

## 2025-05-29 RX ADMIN — URSODIOL 250 MG: 250 TABLET ORAL at 21:49

## 2025-05-29 RX ADMIN — CALCIUM CHLORIDE INJECTION 500 MG: 100 INJECTION, SOLUTION INTRAVENOUS at 10:59

## 2025-05-29 RX ADMIN — CALCIUM CHLORIDE INJECTION 500 MG: 100 INJECTION, SOLUTION INTRAVENOUS at 11:05

## 2025-05-29 RX ADMIN — FENTANYL CITRATE 150 MCG: 50 INJECTION INTRAMUSCULAR; INTRAVENOUS at 10:58

## 2025-05-29 RX ADMIN — NYSTATIN 1000000 UNITS: 100000 SUSPENSION ORAL at 20:11

## 2025-05-29 ASSESSMENT — ACTIVITIES OF DAILY LIVING (ADL)
ADLS_ACUITY_SCORE: 57
ADLS_ACUITY_SCORE: 57
ADLS_ACUITY_SCORE: 69
ADLS_ACUITY_SCORE: 71
ADLS_ACUITY_SCORE: 71
ADLS_ACUITY_SCORE: 57
ADLS_ACUITY_SCORE: 71
ADLS_ACUITY_SCORE: 57
ADLS_ACUITY_SCORE: 69
ADLS_ACUITY_SCORE: 57
ADLS_ACUITY_SCORE: 59
ADLS_ACUITY_SCORE: 57
ADLS_ACUITY_SCORE: 71
ADLS_ACUITY_SCORE: 71
ADLS_ACUITY_SCORE: 69
ADLS_ACUITY_SCORE: 57
ADLS_ACUITY_SCORE: 57
ADLS_ACUITY_SCORE: 69

## 2025-05-29 ASSESSMENT — ENCOUNTER SYMPTOMS: DYSRHYTHMIAS: 1

## 2025-05-29 NOTE — ANESTHESIA PROCEDURE NOTES
Arterial Line Procedure Note    Pre-Procedure   Staff -        Anesthesiologist:  Shin Edge MD       Performed By: anesthesiologist       Location: OR       Pre-Anesthestic Checklist: patient identified, IV checked, risks and benefits discussed, informed consent, monitors and equipment checked, pre-op evaluation and at physician/surgeon's request  Timeout:       Correct Patient: Yes        Correct Procedure: Yes        Correct Site: Yes        Correct Position: Yes   Line Placement:   This line was placed Post Induction  Procedure   Procedure: arterial line       Laterality: left       Insertion Site: brachial.  Sterile Prep        Standard elements of sterile barrier followed       Skin prep: Chloraprep  Insertion/Injection        Technique: ultrasound guided        1. Ultrasound was used to evaluate the access site.       2. Artery evaluated via ultrasound for patency/adequacy.       3. Using real-time ultrasound the needle/catheter was observed entering the artery/vein.       Catheter Type/Size: 20 G, 12 cm  Narrative         Secured by: suture       Biopatch dressing used.       Complications: None apparent,        Arterial waveform: Yes        IBP within 10% of NIBP: Yes   Comments:  I was called to assist in placement of an arterial line due to hemodynamic instability.  The procedure needed to be performed under the drapes due to case urgency, but sterility was maintained throughout.      No adequate radial arterial targets were identified.  The left brachial / axillary artery appeared adequate to tolerate a 20g catheter.    Chlorhexidine.  Arrow Quickcath under ultrasound guidance to left distal axillary / possibly brachial artery (difficult to ascertain under drapes).  Wire up, catheter over wire, needle and wire removed.  A long wire was then theaded through the Arrow catheter, the catheter removed, and a 12cm catheter was advanced into the axillary artery without difficulty.  Wire removed.   Good waveform, with MAP within 10% of cuff.  Secured with Cavilon, suture, sterile tegaderm, and tape after placement of biopatch.  No complications.    I performed / was present for the entire procedure.      NIMISHA Edge MD  Clinical   Anesthesia / Critical Care  *[phone]

## 2025-05-29 NOTE — ANESTHESIA CARE TRANSFER NOTE
Patient: Luz Thompson    Procedure: Procedure(s):  bilateral Insert stent ureter, cystoscopy  Open sigmoidectomy with end colostmy       Diagnosis: Diverticulitis of colon [K57.32]  Diagnosis Additional Information: No value filed.    Anesthesia Type:   General     Note:    Oropharynx: oropharynx clear of all foreign objects  Level of Consciousness: drowsy and awake  Oxygen Supplementation: face mask  Level of Supplemental Oxygen (L/min / FiO2): 6  Independent Airway: airway patency satisfactory and stable  Dentition: dentition unchanged  Vital Signs Stable: post-procedure vital signs reviewed and stable    Patient transferred to: PACU    Handoff Report: Identifed the Patient, Identified the Reponsible Provider, Reviewed the pertinent medical history, Discussed the surgical course, Reviewed Intra-OP anesthesia mangement and issues during anesthesia, Set expectations for post-procedure period and Allowed opportunity for questions and acknowledgement of understanding      Vitals:  Vitals Value Taken Time   /71 05/29/25 12:45   Temp     Pulse 77 05/29/25 12:46   Resp 0 05/29/25 12:46   SpO2 100 % 05/29/25 12:46   Vitals shown include unfiled device data.    Electronically Signed By: LIZ Bedolla CRNA  May 29, 2025  12:47 PM

## 2025-05-29 NOTE — ED NOTES
[facility]  Expected date:   Expected time:   Means of arrival:   Comments:  [facility] transfer

## 2025-05-29 NOTE — ED NOTES
Pt BP up to 88/59 with MAP of 65 with levophed running at 0.02 mcg/kg/min. Pt cap refill <2. Speaking to staff appropriately and able to follow commands.  Pt is not pale, skin has colour appropriate for ethnicity.

## 2025-05-29 NOTE — ED NOTES
Transferred to OR. Levo at 0.1mcg/kg/min. patient MAP decreased to 59-64. Daughter at bedside visiting.  Consent signed   normal...

## 2025-05-29 NOTE — ED NOTES
Transport is here, provider was here on the unit so discussed the lactic, patient ok to transport to [facility] per provider.

## 2025-05-29 NOTE — H&P
5  SURGICAL ICU ADMISSION NOTE  05/29/2025      PRIMARY TEAM: Hospitalist service [facility] team  PRIMARY PHYSICIAN: Natividad Lundberg   REASON FOR CRITICAL CARE ADMISSION: post op hypotension requiring IV pressors  ADMITTING PHYSICIAN: Date of Service (when I saw the patient): 05/29/2025    ASSESSMENT:  Luz Thompson is a 75 year old female who was admitted on 5/28/2025 with past medical history of atrial fibrillation, DVT on eliquis, CVA, CKD III, HFpEF (TTE 3/9/25 EF 60-65%), T2DM,  biliary cirrhosis s/p liver transplant in 2002, EBV, HTN, HLD, Sjogren's syndrome, Basal Cell Carincoma s/p MOHS on 4/8/25, thyroid cancer s/p thyroidectomy in 2010, Sjogren's syndrome, and diverticulitis with recent admission from 4/14-4/23 for ESBL UTI & E. Faecalis bacteremia related to sigmoid microperforation (treated with IV ertapenem) who now presents for 1-day history of lightheadedness, hypotension, worsening non-bloody diarrhea, and abdominal pain that started on 5/28.     Seen in colorectal surgery clinic the day prior (5/27) where it was decided that she should undergo open sigmoidectomy with DLI. She presented in the ED for abdominal pain, hypotension per EMS and CRS recommending surgical intervention.    5/29/2025:   CRS: Dr. Campbell: Exploratory laparotomy, sigmoidectomy with end colostomy.  Cystoscopy with bilateral stent placement with urology team.      Home medications  Tylenol 500 twice daily  Amlodipine 5 mg daily  Apixaban 2.5 mg twice daily  Calcitriol 0.25 mcg daily  D-mannose p.o. twice daily  Evolocumab 140 mg subcu every 14 days  Ezetimibe 10 mg daily  Ferrous sulfate 325 daily  Folic acid 1 mg daily  Gabapentin 20 mg nightly  Hydralazine 50 mg 3 times daily      PLAN:    Neurological:  # Acute post op pain  # high risk delirium   - delirium precaution   - Monitor neurological status. Delirium preventions and precautions.   - Pain: tyleno 975mg BID, gabapentin 300mg QHS, PRN: dilaudid 0.1mg, PO oxycodone  2.5mg    - Sedation plan: NONE  - Lidocaine patches Daily, Diclofenac QID  - PTA zoloft  - melatonin Q HS  - PRN narcan    Pulmonary:   # Post operative ventilatory support   # Acute hypoxic respiratory support   - Supplemental oxygen to keep saturation above 92 %.  - Incentive spirometer every 15- 30 minutes.   - PRN albuterol       Cardiovascular:    # Paroxysmal A-fib on apixaban   # Hypertension  # HFpEF (TTE 3/9/25 EF 60-65%)   # history of CVA  # Vasoplagia  - Monitor hemodynamic status.  - Levophed gtt  - MAP goal >65  - PTA ezetimibe   - HOLD PTA apixaban      Gastroenterology/Nutrition:  # primary biliary cirrhosis  # Status post liver transplant 2002  # Diverticulitis  # Status post exploratory laparotomy with Sigmoidectomy with end colostomy  # Protein calorie deficit malnutrition    - RD consult. Appreciate cares and recommendations.   - PTA Nystatin QID  - PTA Ursodiol   - Supplement: Nutrisource   - PRN Tums, zofran    Fluids/Electrolytes:   # NPO  - LR 100ml/hr for IV fluid hydration  - electrolyte replacement protocol    - daily weights    Renal/:  # Chronic kidney disease III  # Acute kidney injury   # Bilateral stent placement per urology 5/29  - Baseline creatinine 1.3-1.6  - Cr 2.67  - urinary aj in placed   - PTA estradiol vaginal  cream      Endocrine:  # Type 2 diabetes  # Stress hyperglycemia    # Thyroid cancer status post thyroidectomy 2010  - Sliding scale for glucose management  - Goal to keep BG< 180 for optimal wound healing   - Glucose AC/HS  - PTA calcitriol  - hypoglycemic protocol  - PTA levothyroxine 175mcg  - HOLD PTA prednisone 9mg daily      ID:  # Reactive Leukocytosis   # EBV  # Sjogren's syndrome  # ESBL UTI with E faecalis bacteremia April 14 to 23 2025  Transplant ID consulted 5/28  # immunosuppression   - appreciate hepatology  - HOLD tacrolimus  - continue steroids  - discontinue sirolimus  # immunoprophylaxis  # perioperative antibiotics:   - Ertapeneum   -  vancomycin: pharmacy consult  -WBC 20  - daily CBC    Positive cultures:  - 2/2 Gram Negative Bacilli from 5/28/25  - Klebsiella pneumoniae detected from PCR from 5/28/25    Heme:     # Acute blood loss anemia    # Anemia of critical illness   - Hemoglobin 8.5  - Transfuse if hgb <7.0 or signs/symptoms of hypoperfusion. Monitor and trend.     # coagulopathy due to cirrhosis and surgical blood loss    # thrombocytopenia   # anemia of critical illness   - Platelet 215  - PTA Ferous sulfate, folic acid      Musculoskeletal:   # Weakness and deconditioning of critical illness   - Physical and occupational therapy consult   - up with assist, fall precaution       Skin:  # Basal cell carcinoma status post Mohs April 2025  # pruritus   - PRN bendryl PO      General Cares/Prophylaxis:    DVT Prophylaxis: SCDs. Chemical prophylaxis: non immediately post op  GI Prophylaxis: Not indicated  Restraints: Restraints for medical healing needed: NO    Lines/ tubes/ drains:  - PIVs  - PATY drain right abdomen  - Ron      Disposition:  -  Surgical ICU    Patient seen, findings and plan discussed with surgical ICU staff, Dr. Oneal.    Time spent on this encounter  Billing:  I spent 45 minutes bedside and on the inpatient unit today managing the critical care of Luz Thompson in relation to the issues listed in this note.      FILEMON Perea, DO  General Surgery PGY-1    - - - - - - - - - - - - - - - - - - - - - - - - - - - - - - - - - - - - - - - - - - - - - -   HISTORY PRESENTING ILLNESS:   Luz Thompson is a 75 year old female who was admitted on 5/28/2025 with past medical history of atrial fibrillation, DVT on eliquis, CVA, CKD III, HFpEF (TTE 3/9/25 EF 60-65%), T2DM,  biliary cirrhosis s/p liver transplant in 2002, EBV, HTN, HLD, Sjogren's syndrome, Basal Cell Carincoma s/p MOHS on 4/8/25, thyroid cancer s/p thyroidectomy in 2010, Sjogren's syndrome, and diverticulitis with recent admission from 4/14-4/23 for ESBL UTI & E.  "Faecalis bacteremia related to sigmoid microperforation (treated with IV ertapenem) who now presents for 1-day history of lightheadedness, hypotension, worsening non-bloody diarrhea, and abdominal pain that started on 5/28.   Seen in colorectal surgery clinic the day prior (5/27) where it was decided that she should undergo open sigmoidectomy with DLI. She presented in the ED for abdominal pain, hypotension per EMS and CRS recommending surgical intervention.    REVIEW OF SYSTEMS: 10 point ROS neg other than the symptoms noted above in the HPI.    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 Gentry Ramirez, 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 "       SURGICAL HISTORY:   Past Surgical History:   Procedure Laterality Date    APPENDECTOMY      CATARACT IOL, RT/LT      RE2013, LE12/10/2013 - Toric lenses    CHOLECYSTECTOMY      COLONOSCOPY  03/10/2014    Procedure: COLONOSCOPY;;  Surgeon: Gentry Ramirez MD;  Location: [facility] GI    CYSTOSCOPY      ear drum repair      ENDOBRONCHIAL ULTRASOUND FLEXIBLE N/A 2017    Procedure: ENDOBRONCHIAL ULTRASOUND FLEXIBLE;  Flexible Bronchoscopy, Endobronchial Ultrasound, Transbronchial Needle Aspiration ;  Surgeon: Eden Clinton MD;  Location: [facility] OR    ENDOSCOPIC RETROGRADE CHOLANGIOPANCREATOGRAM  2013    Procedure: ENDOSCOPIC RETROGRADE CHOLANGIOPANCREATOGRAM;  Endoscopic Retrograde Cholangiopancreatogram with single balloon enteroscopy, ballon sweep of bile duct;  Surgeon: Brett Membreno MD;  Location: [facility] OR    HC KNEE SCOPE,MED/LAT MENISECTOMY Right 08/10/2012    partial medial menisectomy only    KNEE SURGERY  1966    R knee    PICC INSERTION  2013    4fr SL PASV PICC, 40cm (1cm external) in the R basilic vein w/ tip in the low SVC    PICC INSERTION  2014    5 fr DL BioFlo Navilyst PICC, 46 cm (3 cm external) in the L basilic vein w/ tip in the SVC RA junction.    THYROIDECTOMY  2010    TRANSPLANT LIVER RECIPIENT LIVING UNRELATED  2002       SOCIAL HISTORY:   Social History     Socioeconomic History    Marital status:      Spouse name: Robbin Thompson    Number of children: 4    Years of education: 20   Occupational History    Occupation: Guardian Conservator      Employer: [employer]     Comment: social work     Employer: S[employer]   Tobacco Use    Smoking status: Former     Current packs/day: 0.00     Average packs/day: 1 pack/day for 18.0 years (18.0 ttl pk-yrs)     Types: Cigarettes     Start date: 1967     Quit date: 1985     Years since quittin.1    Smokeless tobacco: Never   Vaping Use    Vaping status: Never Used   Substance and  "Sexual Activity    Alcohol use: Yes     Alcohol/week: 0.0 standard drinks of alcohol     Comment: rare - \"I toast at weddings\"    Drug use: No    Sexual activity: Yes     Partners: Male     Birth control/protection: Post-menopausal   Other Topics Concern    Exercise Yes     Comment: cardio and strengthing   Social History Narrative    She is retired. [place] of [place]. 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     Social Drivers of Health     Financial Resource Strain: Low Risk  (4/15/2025)    Financial Resource Strain     Within the past 12 months, have you or your family members you live with been unable to get utilities (heat, electricity) when it was really needed?: No   Food Insecurity: Low Risk  (4/15/2025)    Food Insecurity     Within the past 12 months, did you worry that your food would run out before you got money to buy more?: No     Within the past 12 months, did the food you bought just not last and you didn t have money to get more?: No   Transportation Needs: Low Risk  (4/15/2025)    Transportation Needs     Within the past 12 months, has lack of transportation kept you from medical appointments, getting your medicines, non-medical meetings or appointments, work, or from getting things that you need?: No   Recent Concern: Transportation Needs - High Risk (1/27/2025)    Transportation Needs     Within the past 12 months, has lack of transportation kept you from medical appointments, getting your medicines, non-medical meetings or appointments, work, or from getting things that you need?: Yes   Physical Activity: Insufficiently Active (11/9/2023)    Received from [employer]    Exercise Vital Sign     Days of Exercise per Week: 5 days     Minutes of Exercise per Session: 10 min   Stress: No Stress Concern Present (11/9/2023)    Received from [employer]    [employer] - Occupational Stress Questionnaire     Feeling of Stress : Only a little   Social Connections: Feeling Socially Integrated (12/20/2023)    Received from [facility]    OASIS : Social Isolation     Frequency of experiencing loneliness or isolation: Never   Interpersonal Safety: Low Risk  (4/15/2025)    Interpersonal Safety     Do you feel physically and emotionally safe where you currently live?: Yes     Within the past 12 months, have you been hit, slapped, kicked or otherwise physically hurt by someone?: No     Within the past 12 months, have you been humiliated or emotionally abused in other ways by your partner or ex-partner?: No   Housing Stability: Low Risk  (4/15/2025)    Housing Stability     Do you have housing? : Yes     Are you worried about losing your housing?: No   Recent Concern: Housing Stability - High Risk (1/22/2025)    Housing Stability     Do you have housing? : No     Are you worried about losing your housing?: No     FAMILY HISTORY: No bleeding/clotting disorders nor problems with anesthesia.    ALLERGIES:   Allergies   Allergen Reactions    Fluconazole Hives and Itching     Full body hives  **Intradermal skin testing negative**  [See intradermal skin testing results from 8/2/2019]    Mycophenolate Diarrhea and Nausea and Vomiting     Patient stated it was chronic and lasted months      Penicillins Anaphylaxis, Hives, Itching and Rash     **Intradermal skin testing negative**  [See intradermal skin testing results from 8/2/2019]      Simvastatin Muscle Pain (Myalgia)     severe  Other reaction(s): Myalgia caused by statin    Methotrexate Other (See Comments)     Other reaction(s): Sore  Sores in mouth, esophagus, and stomach.       Morphine And Codeine Itching and Other (See Comments)     Psych disturbance  Other reaction(s): Confusion, Mood alteration    Quinolones Anxiety, Dizziness, Headache, Other (See Comments), Palpitations and Unknown     Other reaction(s): Hyperactive  behavior, Lightheadedness, Mood alteration    Dizzy, light headed    Dizziness, shaky, and jumpy    Capsules, Empty Gelatin [Gelatin]     Carvedilol Diarrhea     Per pt - severe diarrhea and LE swelling  + tolerating Toprol    Lansoprazole Diarrhea    Strawberry Extract     Azithromycin Itching     [See intradermal skin testing results from 8/2/2019]    Bactrim [Sulfamethoxazole-Trimethoprim] Other (See Comments)     Numb mouth, tingling lips (treated with anti-histamines)    Cephalosporins Itching     [See intradermal skin testing results from 8/2/2019]    Ciprofloxacin Hcl Other (See Comments) and Dizziness     Insomnia, mood lability, Irregular heart beat         Doxycycline Itching and Unknown     [See intradermal skin testing results from 8/2/2019]    Lisinopril Cough    Omeprazole Itching    Tolectin [Nsaids] Rash    Tolmetin Rash and Itching    Tramadol Rash, Hives and Itching       MEDICATIONS:  Current Facility-Administered Medications   Medication Dose Route Frequency Provider Last Rate Last Admin    [Auto Hold] acetaminophen (TYLENOL) tablet 975 mg  975 mg Oral BID Jah Bettencourt PA-C   975 mg at 05/28/25 2253    [Auto Hold] albuterol (PROVENTIL) neb solution 2.5 mg  2.5 mg Nebulization Q4H PRN Jah Bettencourt PA-C        [Held by provider] amLODIPine (NORVASC) tablet 5 mg  5 mg Oral Daily Jah Bettencourt PA-C        [Held by provider] apixaban ANTICOAGULANT (ELIQUIS) tablet 2.5 mg  2.5 mg Oral BID Jah Bettencourt PA-C        [Auto Hold] calcitRIOL (ROCALTROL) capsule 0.25 mcg  0.25 mcg Oral Daily Jah Bettencourt PA-C        [Auto Hold] calcium carbonate (TUMS) chewable tablet 1,000 mg  1,000 mg Oral Q4H PRN Jah Bettencourt PA-C        [Auto Hold] glucose gel 15-30 g  15-30 g Oral Q15 Min PRN Jah Bettencourt PA-C        Or    [Auto Hold] dextrose 50 % injection 25-50 mL  25-50 mL Intravenous Q15 Min PRN Jah Bettencourt PA-C        Or    [Auto Hold] glucagon injection 1 mg  1 mg  Subcutaneous Q15 Min PRN Jah Bettencourt PA-C        [Auto Hold] diclofenac (VOLTAREN) 1 % topical gel 4 g  4 g Topical 4x Daily Jah Bettencourt PA-C        [Auto Hold] diphenhydrAMINE (BENADRYL) tablet 25 mg  25 mg Oral Q6H PRN Jah Bettencourt PA-C        [Auto Hold] ertapenem (INVanz) 500 mg in sodium chloride 0.9 % 50 mL intermittent infusion  500 mg Intravenous Q24H Samson Estes MD   Stopped at 05/28/25 1826    [Auto Hold] estradiol (ESTRACE) cream 2 g  2 g Vaginal Once per day on Monday Wednesday Friday Jah Bettencourt PA-C        [Auto Hold] ezetimibe (ZETIA) tablet 10 mg  10 mg Oral At Bedtime Jah Bettencourt PA-C        [Auto Hold] ferrous sulfate (FEROSUL) tablet 325 mg  325 mg Oral At Bedtime Jah Bettencourt PA-C        [Auto Hold] folic acid (FOLVITE) tablet 1,000 mcg  1,000 mcg Oral At Bedtime Jah Bettencourt PA-C   1,000 mcg at 05/28/25 2253    [Auto Hold] gabapentin (NEURONTIN) solution 300 mg  300 mg Oral At Bedtime Jah Bettencourt PA-C        [Held by provider] hydrALAZINE (APRESOLINE) tablet 50 mg  50 mg Oral TID Jah Bettencourt PA-C        [Auto Hold] hydrocortisone sodium succinate PF (solu-CORTEF) injection 100 mg  100 mg Intravenous Q6H Torsten Diaz MD   100 mg at 05/29/25 0432    [Auto Hold] insulin aspart (NovoLOG) injection (RAPID ACTING)  1-3 Units Subcutaneous TID AC Jah Bettencourt PA-C        [Auto Hold] insulin aspart (NovoLOG) injection (RAPID ACTING)  1-3 Units Subcutaneous At Bedtime Jah Bettencourt PA-C        lactated ringers infusion   Intravenous Continuous Jah Bettencourt PA-C   Stopped at 05/29/25 0647    [Auto Hold] levothyroxine (SYNTHROID/LEVOTHROID) tablet 175 mcg  175 mcg Oral QAM AC Jah Bettencourt PA-C        [Auto Hold] lidocaine (LMX4) cream   Topical Q1H PRN Jah Bettencourt PA-C        [Auto Hold] lidocaine 1 % 0.1-1 mL  0.1-1 mL Other Q1H PRN Jah Bettencourt PA-C        [Auto Hold] melatonin tablet 1 mg  1 mg Oral At Bedtime PRN  Jah Bettencourt PA-C   1 mg at 05/28/25 9703    [Held by provider] metoprolol succinate ER (TOPROL XL) 24 hr tablet 25 mg  25 mg Oral Daily Jah Bettencourt PA-C        [Auto Hold] naloxone (NARCAN) injection 0.2 mg  0.2 mg Intravenous Q2 Min PRN Aleja Antunez MD        Or    [Auto Hold] naloxone (NARCAN) injection 0.4 mg  0.4 mg Intravenous Q2 Min PRN Aleja Antunez MD        Or    [Auto Hold] naloxone (NARCAN) injection 0.2 mg  0.2 mg Intramuscular Q2 Min PRN Aleja Antunez MD        Or    [Auto Hold] naloxone (NARCAN) injection 0.4 mg  0.4 mg Intramuscular Q2 Min PRN Aleja Antunez MD        norepinephrine (LEVOPHED) 4 mg in  mL PERIPHERAL infusion  0.01-0.2 mcg/kg/min Intravenous Continuous Swathi Correa APRN CNP 6.195 mL/hr at 05/29/25 1058 0.02 mcg/kg/min at 05/29/25 1058    [Auto Hold] Nutrisource Fiber PO packet 1 each  1 packet Oral Daily Jah Bettencourt PA-C        [Auto Hold] nystatin (MYCOSTATIN) suspension 1,000,000 Units  1,000,000 Units Oral 4x Daily Jah Bettencourt PA-C        [Auto Hold] ondansetron (ZOFRAN) injection 4 mg  4 mg Intravenous Q6H PRN Jah Bettencourt PA-C        [Auto Hold] oxyCODONE IR (ROXICODONE) half-tab 2.5 mg  2.5 mg Oral Q4H PRN Jah Bettencourt PA-C        [Held by provider] predniSONE (DELTASONE) half-tab 9 mg  9 mg Oral Daily Luzma Osman, APRN CNP        [Auto Hold] sertraline (ZOLOFT) tablet 50 mg  50 mg Oral Daily Jah Bettencourt PA-C        [Auto Hold] sodium chloride (PF) 0.9% PF flush 3 mL  3 mL Intracatheter Q8H LUZMARIA Jah Bettencourt PA-C        [Auto Hold] sodium chloride (PF) 0.9% PF flush 3 mL  3 mL Intracatheter q1 min prn Jah Bettecnourt PA-C        [Held by provider] tacrolimus (GENERIC EQUIVALENT) capsule 0.5 mg  0.5 mg Oral BID IS Luzma Osman APRN CNP        [Auto Hold] ursodiol (ACTIGALL) tablet 250 mg  250 mg Oral BID Jah Bettencourt PA-C        [Auto Hold] vancomycin place horvath - receiving intermittent dosing  1  each Intravenous See Admin Instructions Jah Bettencourt PA-C         Facility-Administered Medications Ordered in Other Encounters   Medication Dose Route Frequency Provider Last Rate Last Admin    calcium chloride injection   Intravenous PRN Randell Walker APRN CRNA   500 mg at 05/29/25 1105    fentaNYL (PF) (SUBLIMAZE) injection   Intravenous PRN Sindhu Tuttle MD   150 mcg at 05/29/25 1058    insulin (regular) (NovoLIN-R, HumuLIN-R) BOLUS from syringe or bag ADULT/PEDS   Intravenous PRN Randell Walker APRN CRNA   4 Units at 05/29/25 1059    lactated ringers infusion   Intravenous Continuous PRN Sindhu Tuttle MD 0 mL/hr at 05/29/25 1107 New Bag at 05/29/25 1147    lidocaine 2% injection (MDV)   Intravenous PRN Sindhu Tuttle MD   60 mg at 05/29/25 0952    ondansetron (ZOFRAN) injection   Intravenous PRN Randell Walker APRN CRNA   4 mg at 05/29/25 1136    phenylephrine (RAJAN-SYNEPHRINE) injection   Intravenous Continuous PRN Sindhu Tuttle MD   100 mcg at 05/29/25 0954    Plasma-Lyte A (electrolyte A) BOLUS   Intravenous Continuous PRN Hina Bailon APRN CRNA   Stopped at 05/29/25 1147    propofol (DIPRIVAN) injection 10 mg/mL vial   Intravenous PRN Sindhu Tuttle MD   100 mg at 05/29/25 0955    rocuronium injection   Intravenous PRN Sindhu Tuttle MD   50 mg at 05/29/25 0956    sugammadex (BRIDION) injection   Intravenous PRN Randell Walker APRN CRNA   100 mg at 05/29/25 1140       PHYSICAL EXAMINATION:  Temp:  [97.6  F (36.4  C)-98.4  F (36.9  C)] 98.1  F (36.7  C)  Pulse:  [] 68  Resp:  [16-18] 16  BP: ()/(33-95) 111/51  SpO2:  [88 %-100 %] 100 %  General: appropriately  HEENT: WNL  Neck:WNL   Neuro: A&Ox3, NAD  Pulm/Resp: Clear breath sounds bilaterally without rhonchi, crackles or wheeze, breathing non-labored  CV: RRR  Abdomen: Soft, mildly distended, tender diffuse. PATY drain  Ostomy: moist/pink, viable  :   aj catheter in place, urine yellow and clear  Incisions/Skin: CDI  MSK/Extremities: no peripheral edema, moving all extremities, peripheral pulses intact, extremities well perfused.    LABS: Reviewed.   Arterial Blood Gases   Recent Labs   Lab 05/29/25  1051   PH 7.32*   PCO2 35   PO2 136*   HCO3 18*     Complete Blood Count   Recent Labs   Lab 05/29/25  1051 05/29/25  0546 05/28/25  1343 05/27/25  1136   WBC  --  9.1 9.3 11.3*   HGB 8.5* 7.6* 9.9* 11.3*   PLT  --  200 291 370     Basic Metabolic Panel  Recent Labs   Lab 05/29/25  1051 05/29/25  0754 05/29/25  0656 05/29/25  0515 05/28/25  2302 05/28/25  2153 05/28/25  1825 05/28/25  1343 05/27/25  1136     --   --   --   --  138  --  136 136   POTASSIUM 4.5  --   --   --   --  4.3  --  4.0 4.6   CHLORIDE  --   --   --   --   --  101  --  97* 97*   CO2  --   --   --   --   --  21*  --  21* 20*   BUN  --   --   --   --   --  107.1*  --  117.4* 103.0*   CR  --   --   --   --   --  2.67*  --  2.99* 2.44*   * 159* 163* 169*   < > 244*   < > 112* 98    < > = values in this interval not displayed.     Liver Function Tests  Recent Labs   Lab 05/29/25  0546 05/28/25  1343 05/27/25  1136   AST  --  23 26   ALT  --  27 28   ALKPHOS  --  160* 178*   BILITOTAL  --  0.4 0.4   ALBUMIN  --  3.2* 3.6   INR 1.49*  --   --      Pancreatic Enzymes  No lab results found in last 7 days.  Coagulation Profile  Recent Labs   Lab 05/29/25  0546   INR 1.49*   PTT 35       IMAGING:  Recent Results (from the past 24 hours)   CT Abdomen Pelvis w/o Contrast    Narrative    EXAM: CT ABDOMEN PELVIS W/O CONTRAST  LOCATION: [facility]  DATE: 5/28/2025    INDICATION: abd pain , diarrhea, known abscess  COMPARISON: 5/22/2025  TECHNIQUE: CT scan of the abdomen and pelvis was performed without IV contrast. Multiplanar reformats were obtained. Dose reduction techniques were used.  CONTRAST: None.    FINDINGS:   LOWER CHEST: Small right pleural  effusion, decreased compared to prior. Left pleural effusion has resolved. At least moderate coronary artery calcifications. Unchanged groundglass and tree-in-bud opacities in the right lower lobe, with some associated   calcifications.     HEPATOBILIARY: Prior hepatic transplant. Pneumobilia and an area of biliary dilation in the segment 5, both unchanged.    PANCREAS: Fatty infiltration without ductal dilatation. 1.5 cm possible IPMN in the uncinate process (5/91) is unchanged.    SPLEEN: Unremarkable    ADRENAL GLANDS: Unremarkable    KIDNEYS/BLADDER: No significant mass, stones, or hydronephrosis. There are simple or benign cysts. No follow up is needed. Punctate nonobstructing intrarenal calculus in the left kidney.    BOWEL: Multiple colonic diverticula. No acute inflammation or obstruction. Decreasing size of pericolonic fluid collection, now 1.4 cm, previously 1.6 cm, prior to that 2.5 cm.    LYMPH NODES: No adenopathy    VASCULATURE: Atherosclerotic disease without abdominal aortic aneurysm.    PELVIC ORGANS: Atrophic uterus.     MUSCULOSKELETAL: Diffuse osteopenia. Multilevel spondylosis. There are several small to moderate-sized fat-containing ventral hernias.      Impression    IMPRESSION:   1.  No acute findings in the abdomen or pelvis.  2.  Decreasing size of pericolonic fluid collection.  3.  Small right pleural effusion, decreased compared to prior. Left pleural effusion has resolved.  4.  Unchanged groundglass and tree-in-bud opacities in the right lower lobe.       US Pelvic Limited    Narrative    EXAM: US PELVIC LIMITED  LOCATION: [facility]  DATE: 5/28/2025    INDICATION: Post menopausal bleeding  COMPARISON: CT abdomen pelvis 5/20/2025.  TECHNIQUE: Transabdominal scans were performed. Transvaginal ultrasound was attempted but patient was unable to tolerate.    FINDINGS:    Adequately distended bladder. The uterus and ovaries were not visible  transabdominally.       Impression    IMPRESSION:  1.  Nondiagnostic exam.       XR Chest 2 Views    Narrative    EXAM: XR CHEST 2 VIEWS  LOCATION: [facility]  DATE: 5/28/2025    INDICATION: Eval for possible PICC position. Pt reports it is a midline but records indicate PICC placement. She was receiving outpatient antibiotic infusions.  COMPARISON: 5/27/2025      Impression    IMPRESSION:     No venous catheter visualized.    Electronic device projects over the left chest wall.    Right basilar opacities, corresponding with airspace opacities and calcifications seen on CT. Otherwise, no focal airspace disease.    Trace bilateral pleural effusions. No pneumothorax.    Cardiomediastinal silhouette is unremarkable. Aortic calcifications.   CT Abdomen Pelvis w/o Contrast    Narrative    EXAM: CT ABDOMEN PELVIS W/O CONTRAST  LOCATION: [facility]  DATE: 5/29/2025    INDICATION: hypotension, drop in Hgb. Possible surgical intervention today sigmoidectomy  COMPARISON: Noncontrast CT abdomen and pelvis 05/22/2025 and 12/10/2024.  TECHNIQUE: CT scan of the abdomen and pelvis was performed without IV contrast. Multiplanar reformats were obtained. Dose reduction techniques were used.  CONTRAST: None.    FINDINGS:   LOWER CHEST: Bibasilar opacities, left lower lobe more than right, possibly representing area of pneumonia. Some of the opacities are more chronic appearing and suggest scarring, however. Small bilateral simple density pleural effusions. Partially imaged   coronary artery disease in the LAD and RCA.    HEPATOBILIARY: Prior left hepatectomy with normal-appearing remnant right hepatic lobe.    PANCREAS: Atrophic with several likely pancreatic head cyst measuring up to approximately 1.5 cm probably IPMN.    SPLEEN: Atrophic.    ADRENAL GLANDS: Normal.    KIDNEYS/BLADDER: Atrophic. Benign appearing simple and hyperdense renal  cysts. No follow-up needed. No hydronephrosis.    BOWEL: No gastric or bowel distention. Appendix not confidently identified immediately atrophic or surgically absent. Diverticulosis coli without acute diverticulitis. Minimal residual collapsed abscess cavity/scar abutting the mid sigmoid colon when   compared to imaging studies dating back to December 2024. No intraperitoneal fluid.    LYMPH NODES: No lymph nodes in the abdomen or pelvis.    VASCULATURE: The aortoiliac atherosclerosis without aneurysm.    PELVIC ORGANS: Atrophic uterus and adnexa.    MUSCULOSKELETAL: No acute or aggressive osseous abnormalities.      Impression    IMPRESSION:   1.  No hematoma or source of bleeding in the abdomen or pelvis.  2.  Diverticulosis coli without diverticulitis. Tiny residual scar/remnant collection from pericolonic abscess dating back to December 2024.  3.  Possible left lower lobe pneumonia.           Physician Attestation   [place] was seen and evaluated by me on 05/29/25.  She was in critical condition as the result of septic shock s/p arambula's procedure for perforated diverticulitis with immunosuppression.    Her condition is now Critical.      The acute issues managed by me today include   Septic shock   Supportive interventions provided and/or ordered by me include  Septic shock - patient on low dose levophed.  MAP goal 65 mmHg.  Wean as tolerated.  Laboratories ordered.  Maintenance fluids started.  Perforated sigmoid diverticulitis - s/p Arambula's procedure with colorectal surgery.  Monitor ostomy output.  Maintain midline dressing.  Pain and nausea control  S/p Liver transplant - appreciate hepatology.  Continue steroids.  Hold tacrolimus and sirolimus    I directed all treatment decisions.  I agree with the resident/fellow's assessment and plan of care.     I spent a total of 40 minutes personally providing and directing critical care services at the bedside and on the intensive care unit.      Oneil Oneal,  MD  Date of Service 5/29/25

## 2025-05-29 NOTE — PROGRESS NOTES
Medicine hospitalist service  Patient remains hypotensive.  LV function normal.  Elevated  BUN and creatinine.  Patient is on chronic prednisone.    Plan:  NS (normal saline) 1liter bolus and initiate [id number] Q6 hours.  Raghu Diaz MD

## 2025-05-29 NOTE — CONSULTS
GASTROENTEROLOGY CONSULTATION      Date of Admission:  5/28/2025           Reason for Consultation:   We were asked by Dr. Antunez of [facility] medicine to evaluate this patient post-liver transplant for immunosuppression recommendations.          ASSESSMENT AND RECOMMENDATIONS:   Assessment:  75 year old female with a history of primary biliary cholangitis status post liver transplant in 2002, Sjogren syndrome, atrial fibrillation and DVT on apixaban, recurrent UTI, history of rectal perforation in 2017 managed with endoscopic clips (did not require surgery), recent E. Faecalis bacteremia in March as well as fat stranding around sigmoid colon with concern for contained microperforation in the setting of diverticulitis.  She is now admitted for abdominal pain and hypotension.    Liver transplant 2001 for primary biliary cholangitis  She has been on sirolimus and prednisone 9 mg given need for surgery she was transitioning off sirolimus and on to tacrolimus.  Ideally this would have been held for 2 weeks prior to surgery; however procedure was emergent and her last dose was 5/28 - she is at risk for delayed wound healing.  At this time given her renal dysfunction, would hold tacrolimus and continue her prednisone.  We will assess daily for immunosuppression changes.    Septic shock and Klebsiella bacteremia  Sigmoid diverticulitis with perforation  She underwent open sigmoidectomy with end colostomy as well as cystoscopy with ureteral stent placement.     Acute kidney injury on CKD 3  Baseline creatinine 1.3-1.5, now 2.6.  Likely in the setting of shock and critical illness.      Recommendations:   - Hold tacrolimus given DERREK   - Continue prednisone 9 mg daily   - Discontinue sirolimus  - Tacrolimus level tomorrow (ordered)   - IV antibiotics for treatment of bacteremia per primary team  - Daily BMP and hepatic panel   - Hepatology will continue to follow     Pt care plan discussed with attending GI physician,   "Dusty.     Mahendra Castillo MD  Gastroenterology Fellow  ATTENDING NOTE HEPATOLOGY  I saw and discussed this patient with the fellow and participated in the decision making. I agree with the fellow's note. Eneida Montano MD           History of Present Illness:   Luz Thompson is a 75 year old female with a history of liver transplant who presents due to abdominal pain.    Patient reports she is feeling okay today.  She denies any nausea or vomiting, reports her abdominal pain has improved.  She initially came in because of worsening abdominal pain and was found to have hypotension.  Overnight she had maps in the 50s and was started on norepinephrine.  Given her decompensation as well as bacteremia, proceeded to surgery.    Patient's medical history is complex -she recently had abdominal pain and was found to have diverticulitis with microperforation in April 2025.  She was managed with antibiotics IV and had plan to have surgery in June 2025.  Yesterday she saw colorectal surgery in the outpatient setting and transition from sirolimus to tacrolimus was recommended.     Patient reports that she just started taking the tacrolimus yesterday, also took sirolimus as the plan was to continue both until tacrolimus was at a detectable level in the blood.  She also is on prednisone 9 mg daily.  She has been compliant with all of these medications.         Data:   Key relevant labs:   Cr 2.67   AST 23 ALT 27  Tbili 0.4   Tacrolimus <1    Blood cultures 2 out of 2 bottles with Klebsiella  5/20 urinalysis with Klebsiella    Key relevant imaging:  CTAP 5/29   \"IMPRESSION:   1.  No hematoma or source of bleeding in the abdomen or pelvis.  2.  Diverticulosis coli without diverticulitis. Tiny residual scar/remnant collection from pericolonic abscess dating back to December 2024.  3.  Possible left lower lobe pneumonia.\"         Medications:     Current Facility-Administered Medications   Medication Dose Route " Frequency Provider Last Rate Last Admin    [Auto Hold] acetaminophen (TYLENOL) tablet 975 mg  975 mg Oral BID Jah Bettencourt PA-C   975 mg at 05/28/25 2253    [Auto Hold] albuterol (PROVENTIL) neb solution 2.5 mg  2.5 mg Nebulization Q4H PRN Jah Bettencourt PA-C        [Held by provider] amLODIPine (NORVASC) tablet 5 mg  5 mg Oral Daily Jah Bettencourt PA-C        [Held by provider] apixaban ANTICOAGULANT (ELIQUIS) tablet 2.5 mg  2.5 mg Oral BID Jah Bettencourt PA-C        [Auto Hold] calcitRIOL (ROCALTROL) capsule 0.25 mcg  0.25 mcg Oral Daily Jah Bettencourt PA-C        [Auto Hold] calcium carbonate (TUMS) chewable tablet 1,000 mg  1,000 mg Oral Q4H PRN Jah Bettencourt PA-C        [Auto Hold] glucose gel 15-30 g  15-30 g Oral Q15 Min PRN Jah Bettencourt PA-C        Or    [Auto Hold] dextrose 50 % injection 25-50 mL  25-50 mL Intravenous Q15 Min PRN Jah Bettencourt PA-C        Or    [Auto Hold] glucagon injection 1 mg  1 mg Subcutaneous Q15 Min PRN Jah Bettencourt PA-C        [Auto Hold] diclofenac (VOLTAREN) 1 % topical gel 4 g  4 g Topical 4x Daily Jah Bettencourt PA-C        [Auto Hold] diphenhydrAMINE (BENADRYL) tablet 25 mg  25 mg Oral Q6H PRN Jah Bettencourt PA-C        [Auto Hold] ertapenem (INVanz) 500 mg in sodium chloride 0.9 % 50 mL intermittent infusion  500 mg Intravenous Q24H Samson Estes MD   Stopped at 05/28/25 1826    [Auto Hold] estradiol (ESTRACE) cream 2 g  2 g Vaginal Once per day on Monday Wednesday Friday Jah Bettencourt PA-C        [Auto Hold] ezetimibe (ZETIA) tablet 10 mg  10 mg Oral At Bedtime Jah Bettencourt PA-C        [Auto Hold] ferrous sulfate (FEROSUL) tablet 325 mg  325 mg Oral At Bedtime Jah Bettencourt PA-C        [Auto Hold] folic acid (FOLVITE) tablet 1,000 mcg  1,000 mcg Oral At Bedtime Jah Bettencourt PA-C   1,000 mcg at 05/28/25 2253    [Auto Hold] gabapentin (NEURONTIN) solution 300 mg  300 mg Oral At Bedtime Jah Bettencourt PA-C         [Held by provider] hydrALAZINE (APRESOLINE) tablet 50 mg  50 mg Oral TID Jah Bettencourt PA-C        [Auto Hold] hydrocortisone sodium succinate PF (solu-CORTEF) injection 100 mg  100 mg Intravenous Q6H Torsten Diaz MD   100 mg at 05/29/25 0432    [Auto Hold] insulin aspart (NovoLOG) injection (RAPID ACTING)  1-3 Units Subcutaneous TID AC Jah Bettencourt PA-C        [Auto Hold] insulin aspart (NovoLOG) injection (RAPID ACTING)  1-3 Units Subcutaneous At Bedtime Jah Bettencourt PA-C        lactated ringers infusion   Intravenous Continuous Jah Bettencourt PA-C   Stopped at 05/29/25 0647    [Auto Hold] levothyroxine (SYNTHROID/LEVOTHROID) tablet 175 mcg  175 mcg Oral QAM AC Jah Bettencourt PA-C        [Auto Hold] lidocaine (LMX4) cream   Topical Q1H PRN Jah Bettencourt PA-C        [Auto Hold] lidocaine 1 % 0.1-1 mL  0.1-1 mL Other Q1H PRN Jah Bettencourt PA-C        [Auto Hold] melatonin tablet 1 mg  1 mg Oral At Bedtime PRN Jah Bettencourt PA-C   1 mg at 05/28/25 2253    [Held by provider] metoprolol succinate ER (TOPROL XL) 24 hr tablet 25 mg  25 mg Oral Daily Jah Bettencourt PA-C        [Auto Hold] naloxone (NARCAN) injection 0.2 mg  0.2 mg Intravenous Q2 Min PRN Aleja Antunez MD        Or    [Auto Hold] naloxone (NARCAN) injection 0.4 mg  0.4 mg Intravenous Q2 Min PRN Aleja Antunez MD        Or    [Auto Hold] naloxone (NARCAN) injection 0.2 mg  0.2 mg Intramuscular Q2 Min PRN Aleja Antunez MD        Or    [Auto Hold] naloxone (NARCAN) injection 0.4 mg  0.4 mg Intramuscular Q2 Min PRN Aleja Antunez MD        norepinephrine (LEVOPHED) 4 mg in  mL PERIPHERAL infusion  0.01-0.2 mcg/kg/min Intravenous Continuous Swathi Correa APRN CNP 6.195 mL/hr at 05/29/25 1058 0.02 mcg/kg/min at 05/29/25 1058    [Auto Hold] Nutrisource Fiber PO packet 1 each  1 packet Oral Daily Jah Bettencourt PA-C        [Auto Hold] nystatin (MYCOSTATIN) suspension 1,000,000 Units  1,000,000 Units Oral  4x Daily Jah Bettencourt PA-C        [Auto Hold] ondansetron (ZOFRAN) injection 4 mg  4 mg Intravenous Q6H PRN Jah Bettencourt PA-C        [Auto Hold] oxyCODONE IR (ROXICODONE) half-tab 2.5 mg  2.5 mg Oral Q4H PRN Jah Bettencourt PA-C        [Held by provider] predniSONE (DELTASONE) half-tab 9 mg  9 mg Oral Daily Luzma Osman APRN CNP        [Auto Hold] sertraline (ZOLOFT) tablet 50 mg  50 mg Oral Daily Jah Bettencourt PA-C        [Auto Hold] sodium chloride (PF) 0.9% PF flush 3 mL  3 mL Intracatheter Q8H LUZMARIA Jah Bettencourt PA-C        [Auto Hold] sodium chloride (PF) 0.9% PF flush 3 mL  3 mL Intracatheter q1 min prn Jah Bettencourt PA-C        [Held by provider] tacrolimus (GENERIC EQUIVALENT) capsule 0.5 mg  0.5 mg Oral BID IS Luzma Osman APRN CNP        [Auto Hold] ursodiol (ACTIGALL) tablet 250 mg  250 mg Oral BID Jah Bettencourt PA-C        [Auto Hold] vancomycin place horvath - receiving intermittent dosing  1 each Intravenous See Admin Instructions Jah Bettencourt PA-C         Facility-Administered Medications Ordered in Other Encounters   Medication Dose Route Frequency Provider Last Rate Last Admin    calcium chloride injection   Intravenous PRN Randell Walker APRN CRNA   500 mg at 05/29/25 1105    fentaNYL (PF) (SUBLIMAZE) injection   Intravenous PRN Sindhu Tuttle MD   150 mcg at 05/29/25 1058    insulin (regular) (NovoLIN-R, HumuLIN-R) BOLUS from syringe or bag ADULT/PEDS   Intravenous PRN Randell Walker APRN CRNA   4 Units at 05/29/25 1059    lactated ringers infusion   Intravenous Continuous PRN Sindhu Tuttle MD   Stopped at 05/29/25 1107    lidocaine 2% injection (MDV)   Intravenous PRN Sindhu Tuttle MD   60 mg at 05/29/25 0952    phenylephrine (RAJAN-SYNEPHRINE) injection   Intravenous Continuous PRN Sindhu Tuttle MD   100 mcg at 05/29/25 0954    Plasma-Lyte A (electrolyte A) BOLUS   Intravenous Continuous PRN Hina Bailon  "Liliana, LIZ CRNA   New Bag at 05/29/25 1004    propofol (DIPRIVAN) injection 10 mg/mL vial   Intravenous PRN Sindhu Tuttle MD   100 mg at 05/29/25 0955    rocuronium injection   Intravenous PRN Sindhu Tuttle MD   50 mg at 05/29/25 0956             Physical Exam:   /51   Pulse 68   Temp 98.1  F (36.7  C) (Oral)   Resp 16   Ht 1.702 m (5' 7\")   Wt 82.6 kg (182 lb)   LMP 06/01/1988 (Approximate)   SpO2 100%   BMI 28.51 kg/m    Wt:   Wt Readings from Last 2 Encounters:   05/28/25 82.6 kg (182 lb)   05/27/25 82.7 kg (182 lb 6.4 oz)      Constitutional: sitting up in bed in NAD, chronically ill appaering   Eyes: anicteric conjunctiva  Ears/nose/mouth/throat: moist mucous membranes  Respiratory: normal work of breathing on ambient air  Abd: soft, mild TTP lower abdomen, nondistended, no peritoneal signs  Skin: warm, perfused, no jaundice  Neuro: alert and conversant  MSK: moving extremities spontaneously          Past Medical History:     Past Medical History:   Diagnosis Date    Anemia of chronic disease 10/17/2011    Anxiety     CKD (chronic kidney disease) stage 3, GFR 30-59 ml/min (H) 04/04/2012    Coccidioidomycosis, 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            Past Surgical History:     Past Surgical History:   Procedure Laterality Date    APPENDECTOMY  1961    CATARACT IOL, RT/LT      RE12/19/2013, LE12/10/2013 - Toric lenses    CHOLECYSTECTOMY  1991    COLONOSCOPY  03/10/2014    Procedure: COLONOSCOPY;;  Surgeon: Gentry Ramirez MD;  Location: [facility] GI    CYSTOSCOPY      ear drum repair      ENDOBRONCHIAL ULTRASOUND FLEXIBLE N/A 09/29/2017    Procedure: ENDOBRONCHIAL ULTRASOUND FLEXIBLE;  Flexible Bronchoscopy, Endobronchial Ultrasound, Transbronchial Needle Aspiration ;  Surgeon: Eden Clinton MD;  Location: [facility] OR    ENDOSCOPIC RETROGRADE CHOLANGIOPANCREATOGRAM  09/19/2013    Procedure: ENDOSCOPIC RETROGRADE CHOLANGIOPANCREATOGRAM;  Endoscopic Retrograde Cholangiopancreatogram with single balloon enteroscopy, ballon sweep of bile duct;  Surgeon: Brett Membreno MD;  Location: [facility] OR     KNEE SCOPE,MED/LAT MENISECTOMY Right 08/10/2012    partial medial menisectomy only    KNEE SURGERY  1966    R knee    PICC INSERTION  09/18/2013    4fr SL PASV PICC, 40cm (1cm external) in the R basilic vein w/ tip in the low SVC    PICC INSERTION  02/21/2014    5 fr DL BioFlo Navilyst PICC, 46 cm (3 cm external) in the L basilic vein w/ tip in the SVC RA junction.    THYROIDECTOMY  03/2010    TRANSPLANT LIVER RECIPIENT LIVING UNRELATED  05/2002            Social History:     Socioeconomic History    Marital status:      Spouse name: Robbin Thompson    Number of children: 4    Years of education: 20   Occupational History    Occupation: Guardian Conservator      Employer: [employer]     Comment: social work     Employer: SELF   Tobacco Use    Smoking status: Former     Current packs/day: 0.00     Average packs/day: 1 pack/day for 18.0 years (18.0 ttl pk-yrs)     Types: Cigarettes     Start date: 1967     Quit date: 1985     Years since quittin.1    Smokeless tobacco: Never   Vaping Use    Vaping status: Never Used   Substance and Sexual Activity    Alcohol use: Yes     Alcohol/week: 0.0 standard drinks of alcohol     Comment: rare - \"I toast at weddings\"    Drug use: No    Sexual activity: Yes     Partners: Male     Birth control/protection: Post-menopausal   Other Topics Concern    Exercise Yes     Comment: cardio and strengthing   Social History Narrative    She is retired. [place], corn and soybean crops.     Social Drivers of Health     Financial Resource Strain: Low Risk  (4/15/2025)    Financial Resource Strain     Within the past 12 months, have you or your family members you live with been unable to get utilities (heat, electricity) when it was really needed?: No   Food Insecurity: Low Risk  (4/15/2025)    Food Insecurity     Within the past 12 months, did you worry that your food would run out before you got money to buy more?: No     Within the past 12 months, did the food you bought just not last and you didn t have money to get more?: No   Transportation Needs: Low Risk  (4/15/2025)    Transportation Needs     Within the past 12 months, has lack of transportation kept you from medical appointments, getting your medicines, non-medical meetings or appointments, work, or from getting things that you need?: No   Recent Concern: Transportation Needs - High Risk (2025)    Transportation Needs     Within the past 12 months, has lack of transportation kept you from medical appointments, getting your medicines, " non-medical meetings or appointments, work, or from getting things that you need?: Yes   Physical Activity: Insufficiently Active (2023)    Received from [facility]    Exercise Vital Sign     Days of Exercise per Week: 5 days     Minutes of Exercise per Session: 10 min   Stress: No Stress Concern Present (2023)    Received from [facility]    [place] [facility] [employer] - Occupational Stress Questionnaire     Feeling of Stress : Only a little   Social Connections: Feeling Socially Integrated (2023)    Received from [facility]    OASIS : Social Isolation     Frequency of experiencing loneliness or isolation: Never   Interpersonal Safety: Low Risk  (4/15/2025)    Interpersonal Safety     Do you feel physically and emotionally safe where you currently live?: Yes     Within the past 12 months, have you been hit, slapped, kicked or otherwise physically hurt by someone?: No     Within the past 12 months, have you been humiliated or emotionally abused in other ways by your partner or ex-partner?: No   Housing Stability: Low Risk  (4/15/2025)    Housing Stability     Do you have housing? : Yes     Are you worried about losing your housing?: No   Recent Concern: Housing Stability - High Risk (2025)    Housing Stability     Do you have housing? : No     Are you worried about losing your housing?: No            Family History:     Family History   Problem Relation Age of Onset    Hypertension Mother     Endometrial Cancer Mother     Hyperlipidemia Mother     Prostate Cancer Father     Macular Degeneration Father     Cancer - colorectal Maternal Grandmother         in her 80's, has surgery and removal of part of kidney,  at age 98    Heart Disease Maternal Grandfather          at 98    Glaucoma Maternal Grandfather     Cerebrovascular Disease Paternal Grandmother         in her 80's    Hypertension Paternal Grandmother     Heart Disease Paternal Grandfather         MI     Alzheimer Disease Paternal Grandfather     Allergies Son     Neurologic Disorder Daughter         Migraines    Breast Cancer Other     Anesthesia Reaction No family hx of     Crohn's Disease No family hx of     Ulcerative Colitis No family hx of     Melanoma No family hx of     Skin Cancer No family hx of           Allergies:        Allergies   Allergen Reactions    Fluconazole Hives and Itching     Full body hives  **Intradermal skin testing negative**  [See intradermal skin testing results from 8/2/2019]    Mycophenolate Diarrhea and Nausea and Vomiting     Patient stated it was chronic and lasted months      Penicillins Anaphylaxis, Hives, Itching and Rash     **Intradermal skin testing negative**  [See intradermal skin testing results from 8/2/2019]      Simvastatin Muscle Pain (Myalgia)     severe  Other reaction(s): Myalgia caused by statin    Methotrexate Other (See Comments)     Other reaction(s): Sore  Sores in mouth, esophagus, and stomach.       Morphine And Codeine Itching and Other (See Comments)     Psych disturbance  Other reaction(s): Confusion, Mood alteration    Quinolones Anxiety, Dizziness, Headache, Other (See Comments), Palpitations and Unknown     Other reaction(s): Hyperactive behavior, Lightheadedness, Mood alteration    Dizzy, light headed    Dizziness, shaky, and jumpy    Capsules, Empty Gelatin [Gelatin]     Carvedilol Diarrhea     Per pt - severe diarrhea and LE swelling  + tolerating Toprol    Lansoprazole Diarrhea    Strawberry Extract     Azithromycin Itching     [See intradermal skin testing results from 8/2/2019]    Bactrim [Sulfamethoxazole-Trimethoprim] Other (See Comments)     Numb mouth, tingling lips (treated with anti-histamines)    Cephalosporins Itching     [See intradermal skin testing results from 8/2/2019]    Ciprofloxacin Hcl Other (See Comments) and Dizziness     Insomnia, mood lability, Irregular heart beat         Doxycycline Itching and Unknown     [See intradermal  skin testing results from 8/2/2019]    Lisinopril Cough    Omeprazole Itching    Tolectin [Nsaids] Rash    Tolmetin Rash and Itching    Tramadol Rash, Hives and Itching            Review of Systems:     A complete 10 point review of systems was performed and is negative except as noted in the HPI

## 2025-05-29 NOTE — ANESTHESIA PREPROCEDURE EVALUATION
"Anesthesia Pre-Procedure Evaluation    Patient: Luz Thompson   MRN: [id number] : 1950          Procedure : Procedure(s):  Insert stent ureter  Open sigmoidectomy, Izabella's procedure         Past Medical History:   Diagnosis Date    Anemia of chronic disease 10/17/2011    Anxiety     CKD (chronic kidney disease) stage 3, GFR 30-59 ml/min (H) 2012    Coccidioidomycosis, history of 2017    CVA (cerebral vascular accident) (H)     when BP was very low, small multiple infacts in frontal lobe, had \"visual field cut,\" leg weakness, and expressive aphasia - all have resolved.     Deep venous thrombosis     Diverticulosis of sigmoid colon 2013    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, history of     Received Rituxan during Summer of 2016    Glaucoma     H/O esophageal varices     Hearing loss     Hyperlipidemia 04/10/2012    Says that she does not have it anymore, not on meds    Hypertension     Hypertriglyceridemia     Liver replaced by transplant (H) 10/17/2011    Dr. Gentry Ramirez, 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      Past Surgical History:   Procedure Laterality Date    APPENDECTOMY      CATARACT IOL, RT/LT      RE2013, LE12/10/2013 - Toric lenses    CHOLECYSTECTOMY      COLONOSCOPY  03/10/2014    Procedure: COLONOSCOPY;;  Surgeon: Gentry Ramirez MD;  Location:  GI    CYSTOSCOPY      ear drum repair      ENDOBRONCHIAL ULTRASOUND FLEXIBLE N/A 2017    Procedure: " ENDOBRONCHIAL ULTRASOUND FLEXIBLE;  Flexible Bronchoscopy, Endobronchial Ultrasound, Transbronchial Needle Aspiration ;  Surgeon: Eden Clinton MD;  Location: [facility] OR    ENDOSCOPIC RETROGRADE CHOLANGIOPANCREATOGRAM  09/19/2013    Procedure: ENDOSCOPIC RETROGRADE CHOLANGIOPANCREATOGRAM;  Endoscopic Retrograde Cholangiopancreatogram with single balloon enteroscopy, ballon sweep of bile duct;  Surgeon: Brett Membreno MD;  Location: [facility] OR    HC KNEE SCOPE,MED/LAT MENISECTOMY Right 08/10/2012    partial medial menisectomy only    KNEE SURGERY  1966    R knee    PICC INSERTION  09/18/2013    4fr SL PASV PICC, 40cm (1cm external) in the R basilic vein w/ tip in the low SVC    PICC INSERTION  02/21/2014    5 fr DL BioFlo Navilyst PICC, 46 cm (3 cm external) in the L basilic vein w/ tip in the SVC RA junction.    THYROIDECTOMY  03/2010    TRANSPLANT LIVER RECIPIENT LIVING UNRELATED  05/2002      Allergies   Allergen Reactions    Fluconazole Hives and Itching     Full body hives  **Intradermal skin testing negative**  [See intradermal skin testing results from 8/2/2019]    Mycophenolate Diarrhea and Nausea and Vomiting     Patient stated it was chronic and lasted months      Penicillins Anaphylaxis, Hives, Itching and Rash     **Intradermal skin testing negative**  [See intradermal skin testing results from 8/2/2019]      Simvastatin Muscle Pain (Myalgia)     severe  Other reaction(s): Myalgia caused by statin    Methotrexate Other (See Comments)     Other reaction(s): Sore  Sores in mouth, esophagus, and stomach.       Morphine And Codeine Itching and Other (See Comments)     Psych disturbance  Other reaction(s): Confusion, Mood alteration    Quinolones Anxiety, Dizziness, Headache, Other (See Comments), Palpitations and Unknown     Other reaction(s): Hyperactive behavior, Lightheadedness, Mood alteration    Dizzy, light headed    Dizziness, shaky, and jumpy    Capsules, Empty Gelatin [Gelatin]     Carvedilol Diarrhea  "    Per pt - severe diarrhea and LE swelling  + tolerating Toprol    Lansoprazole Diarrhea    Strawberry Extract     Azithromycin Itching     [See intradermal skin testing results from 2019]    Bactrim [Sulfamethoxazole-Trimethoprim] Other (See Comments)     Numb mouth, tingling lips (treated with anti-histamines)    Cephalosporins Itching     [See intradermal skin testing results from 2019]    Ciprofloxacin Hcl Other (See Comments) and Dizziness     Insomnia, mood lability, Irregular heart beat         Doxycycline Itching and Unknown     [See intradermal skin testing results from 2019]    Lisinopril Cough    Omeprazole Itching    Tolectin [Nsaids] Rash    Tolmetin Rash and Itching    Tramadol Rash, Hives and Itching      Social History     Tobacco Use    Smoking status: Former     Current packs/day: 0.00     Average packs/day: 1 pack/day for 18.0 years (18.0 ttl pk-yrs)     Types: Cigarettes     Start date: 1967     Quit date: 1985     Years since quittin.1    Smokeless tobacco: Never   Substance Use Topics    Alcohol use: Yes     Alcohol/week: 0.0 standard drinks of alcohol     Comment: rare - \"I toast at weddings\"      Wt Readings from Last 1 Encounters:   25 82.6 kg (182 lb)        Anesthesia Evaluation   Pt has had prior anesthetic.     No history of anesthetic complications       ROS/MED HX  ENT/Pulmonary:     (+)                              recent URI,          Neurologic:     (+)          CVA,    TIA,                  Cardiovascular: Comment: Undifferentiated sepsis w/ rising lactate to 3.5 and symptomatic hypotenstion. Currently on Norepi gtt     (+) Dyslipidemia hypertension- -   -  - -                        dysrhythmias, a-fib,        Previous cardiac testing   Echo: Date: 3/9/25 Results:  Interpretation Summary  No vegetation or mass identified. Global and regional left ventricular  function is normal with an EF of 60-65%.  Right ventricular function, chamber size, wall " motion, and thickness are  normal.  The inferior vena cava is normal.  No pericardial effusion is present.    Stress Test:  Date: Results:    ECG Reviewed:  Date: Results:    Cath:  Date: Results:      METS/Exercise Tolerance:     Hematologic: Comments: On eliquis    (+) History of blood clots,    pt is anticoagulated,           Musculoskeletal:       GI/Hepatic: Comment: Admitted with LLQ severe abd pain, sigmoid diverticulitis with microperfusion        biliary cirrhosis s/p liver transplant in 2002      (+)           hepatitis  liver disease,       Renal/Genitourinary:     (+) renal disease, type: CRI,            Endo:     (+)  type II DM,        thyroid problem, hypothyroidism papillary thyroid carcinoma,           Psychiatric/Substance Use:       Infectious Disease: Comment:  blood cultures positive for gram negative bacilli    (+) Recent Fever,           Malignancy:   (+) Malignancy, History of Other.Other CA Remission status post Surgery.    Other: Comment: Sjogren's syndrome             Physical Exam  Airway  Mallampati: II  TM distance: >3 FB  Neck ROM: full  Mouth opening: >= 4 cm    Cardiovascular - normal exam Comments: Undifferentiated sepsis w/ rising lactate to 3.5 and symptomatic hypotenstion. Currently on Norepi gtt   Dental   (+) Edentulous      Pulmonary - normal exam      Neurological - normal exam  She appears awake, alert and oriented x3.    Other Findings Multiple sores in mouth       OUTSIDE LABS:  CBC:   Lab Results   Component Value Date    WBC 9.1 05/29/2025    WBC 9.3 05/28/2025    HGB 7.6 (L) 05/29/2025    HGB 9.9 (L) 05/28/2025    HCT 23.6 (L) 05/29/2025    HCT 30.8 (L) 05/28/2025     05/29/2025     05/28/2025     BMP:   Lab Results   Component Value Date     05/28/2025     05/28/2025    POTASSIUM 4.3 05/28/2025    POTASSIUM 4.0 05/28/2025    CHLORIDE 101 05/28/2025    CHLORIDE 97 (L) 05/28/2025    CO2 21 (L) 05/28/2025    CO2 21 (L) 05/28/2025    .1 (H)  05/28/2025    .4 (H) 05/28/2025    CR 2.67 (H) 05/28/2025    CR 2.99 (H) 05/28/2025     (H) 05/29/2025     (H) 05/29/2025     COAGS:   Lab Results   Component Value Date    PTT 35 05/29/2025    INR 1.49 (H) 05/29/2025     POC:   Lab Results   Component Value Date     (H) 08/31/2019     HEPATIC:   Lab Results   Component Value Date    ALBUMIN 3.2 (L) 05/28/2025    PROTTOTAL 5.8 (L) 05/28/2025    ALT 27 05/28/2025    AST 23 05/28/2025    GGT 95 (H) 11/11/2008    ALKPHOS 160 (H) 05/28/2025    BILITOTAL 0.4 05/28/2025    TIFFANY 20 01/03/2025     OTHER:   Lab Results   Component Value Date    LACT 3.7 (H) 05/29/2025    A1C 6.8 (H) 04/14/2025    KEILY 7.3 (L) 05/28/2025    PHOS 4.9 (H) 05/28/2025    MAG 1.8 05/29/2025    LIPASE 29 01/03/2025    AMYLASE 40 11/12/2008    TSH 1.72 04/04/2025    T4 1.92 (H) 11/14/2024    T3 44 (L) 07/30/2018    .0 (H) 08/26/2019    SED 61 (H) 04/15/2025       Anesthesia Plan    ASA Status:  3, emergent      NPO Status: NPO Appropriate   Anesthesia Type: General.  Airway: oral.  Induction: intravenous.  Maintenance: Balanced.   Techniques and Equipment:       - Monitoring Plan: standard ASA monitoring, train of four monitoring, arterial line kit     Consents    Anesthesia Plan(s) and associated risks, benefits, and realistic alternatives discussed. Questions answered and patient/representative(s) expressed understanding.     - Discussed: anesthesiologist, CRNA     - Discussed with:  Patient        - Pt is DNR/DNI Status: no DNR     Blood Consent:      - Discussed with: patient.     - Consented: consented to blood products     Postoperative Care    Pain management: non-narcotic analgesics, multimodal analgesia.     Comments:                   Sindhu Tuttle MD    I have reviewed the pertinent notes and labs in the chart from the past 30 days and (re)examined the patient.  Any updates or changes from those notes are reflected in this note.    Clinically  "Significant Risk Factors Present on Admission          # Hypochloremia: Lowest Cl = 97 mmol/L in last 2 days, will monitor as appropriate  # Hypocalcemia: Lowest Ca = 7.3 mg/dL in last 2 days, will monitor and replace as appropriate    # Anion Gap Metabolic Acidosis: Highest Anion Gap = 19 mmol/L in last 2 days, will monitor and treat as appropriate  # Hypoalbuminemia: Lowest albumin = 3.2 g/dL at 5/28/2025  1:43 PM, will monitor as appropriate  # Drug Induced Coagulation Defect: home medication list includes an anticoagulant medication   # Acute Kidney Injury, unspecified: based on a >150% or 0.3 mg/dL increase in last creatinine compared to past 90 day average, will monitor renal function  # Hypertension: Noted on problem list   # Circulatory Shock: required vasopressors within past 24 hours       # Anemia: based on hgb <11      # DMII: A1C = 6.8 % (Ref range: <5.7 %) within past 6 months    # Overweight: Estimated body mass index is 28.51 kg/m  as calculated from the following:    Height as of this encounter: 1.702 m (5' 7\").    Weight as of this encounter: 82.6 kg (182 lb).       # Financial/Environmental Concerns:                 "

## 2025-05-29 NOTE — OP NOTE
[facility] Colorectal Surgery Operative Report  May 29, 2025    PREOPERATIVE DIAGNOSIS:  Perforated diverticulitis  2. Biliary cirrhosis s/p liver txp  3. CKD stage 3   4. CVA and DVT on anticoagulation  5. HLD/HTN  6. Osteoarthritis   7. A fib  8. Sjogren's syndrome   9. Thyroid cancer s/p thyroidectomy  10. Type 2 diabetes   11. Basal cell carcinoma /p MOHS    POSTOPERATIVE DIAGNOSIS:   SAME    PROCEDURE:  1. Exploratory laparotomy  2. Sigmoidectomy with end colostomy  3. Cystoscopy with stent placement (Urology team)    ANESTHESIA: General endotracheal anesthesia plus local anesthesia.    SURGEON:  Al Campbell M.D.    ASSISTANT(S): Aiden Montano MD, CRS Fellow; Zoë Silva MD, R3    INDICATIONS FOR PROCEDURE  This is a 75 year old F with a complex medical and surgical history, now with perforated diverticulitis on chronic abx. Giver her immunosuppression, an open sigmoidectomy is indicated. She is high risk for postop morbidity. While she was scheduled for an elective operation, she presented bacteremic with abdominal pain requiring pressors and therefore her case became emergent. Risks, benefits, and alternatives of operative treatment were thoroughly discussed with the patient, she understood these well and agrees to proceed     General risks related to abdominal surgery were reviewed with the patient. These include, but are not limited to, death, myocardial infarction, pneumonia, urinary tract infection, deep venous thrombosis with or without pulmonary embolus, abdominal infection from bowel injury or abscess, fistula, anastomotic leak that may require reoperation and a stoma, ureteral injury, bowel obstruction, wound infection, and bleeding.    OPERATIVE PROCEDURE:  After obtaining informed consent, the patient was brought to the operating room and placed in the lithotomy position. Appropriate preoperative mechanical and chemical deep venous thrombosis prophylaxis, as well as preoperative  "prophylactic parenteral antibiotics were given. General endotracheal anesthesia was gently induced. Bilateral lower extremity pneumatic compression devices were applied and all pressure points were cushioned. The abdomen and perineum were then preped and draped in the standard sterile fashion.     The urology team placed stents and a aj catheter. Please, see their note for further details    After a \"time-out\" was performed, the peritoneal cavity was entered through a vertical midline incision.  An Jerrod wound protector was inserted and a [facility] retractor set up.     The sigmoid colon was identified and traced into the pelvis. There was an abscess cavity of the left pelvic sidewall that was broke into with immediate return of pus. A mesorectal window was created at the rectosigmoid junction. The distal bowel was divided with a single fire of the contour stapler.    A mesenteric window was created proximally on the descending colon. This was divided with a fire of the BETTE stapler blue load 75 mm. The mesentery was transected with sequential fires of the ligasure device in order to assure hemostasis. The specimen was passed off for path examination.    The rectal stump was oversewn with a 3-0 Prolene suture. The abdomen was inspected for hemostasis. Peritoneal lavage was performed using 3 L of water. A #19 Fr drain was left in the pelvis and secured in place with a 3-0 Nylon suture. Surgicel spray was applied.    A disc of skin was excised at a previously marked site. The subcutaneous tissues were divided with electrocautery. The anterior sheet was opened longitudinally with electrocautery, the muscle split and the peritoneal cavity entered sharply. The proximal colon stump was delivered outside the abdominal cavity.    The fascia was closed using 0 PDS and the subcutaneous tissue was irrigated with saline.  The skin was closed with skin staples over a yellow vessel loop and sterile dressings were applied.      The " colostomy was matured in a Milka's fashion with 3-0 Vicryl sutures. An ostomy bag was applied.    The patient was extubated and brought to PACU in stable conditions. They tolerated the procedure well without immediate complications. I was scrubbed for all critical components of the operation. All sponge and needle counts were correct x 2 at the end of the procedure.      COMPLICATIONS: none.    ESTIMATED BLOOD LOSS: 50mL.    REPLACEMENT:     - Crystalloid: 1500mL.    SPECIMEN(S): Sigmoid colon    OPERATIVE COUNT: Complete.    OPERATIVE FINDINGS:   Diverticulitis with pelvic abscess    Al Campbell MD, FACS, FASCRS, FSSO    Division of Colon & Rectal Surgery  Department of Surgery  [facility]  [phone]

## 2025-05-29 NOTE — PROVIDER NOTIFICATION
05/29/25 0500   Call Information   Date of Call 05/29/25   Time of Call 0509   Name of person requesting the team Brodie Meza   Title of person requesting team RN   RRT Arrival time 0515   Time RRT ended 0645   Reason for call   Type of RRT Adult   Primary reason for call Cardiovascular   Cardiovascular SBP less than 90   Was patient transferred from the ED, ICU, or PACU within last 24 hours prior to RRT call? Yes   SBAR   Situation Hypotension with systolics in the 60's and MAPs in the 40's   Background Per provider note: PMH of primary biliary cirrhosis s/p liver transplant in 2002, paroxysmal atrial fibrillation on apixaban, HTN, T2DM, CKD, HF, hx of CVA, hypothyroidism, and recurrent ESBL UTIs. She is admitted to [facility] ED for abdominal pain, diverticulitis.   Notable History/Conditions Hypertension;Diabetes;Cardiac   Assessment A&O, able to make needs known but is drowsy. Pale complextion. Breathing comfortably. Denies pain and denies recently having pain   Interventions Blood glucose;ECG;Fluid bolus;IV fluids;Labs;Meds;O2 per N/C or mask;Other (describe)  (CT)   Patient Outcome   Patient Outcome Stabilized on unit   RRT Team   Attending/Primary/Covering Physician Med Gold 3   Date Attending Physician notified 05/29/25   Physician(s) LIZ Rome CNP   Lead RN Bulmaro Meza   Post RRT Intervention Assessment   Post RRT Assessment Stable/Improved   Date Follow Up Done 05/29/25   Time Follow Up Done 0921   Comments Pt is going to OR. MAPs continue <60. May increase pressor as needed. Consider central line.

## 2025-05-29 NOTE — PROGRESS NOTES
Brief Progress Note    In to assess patient for AM rounds. She denies recent episodes of bleeding. She is increasingly hemodynamically unstable, now requiring 0.09 levophed. Consented patient for D&C, endometrial sampling and possible hysteroscopy as well as blood transfusion. Discussed case with CRS. Due to hemodynamic instability and non-emergent nature of D&C the decision was made to forego the gynecologic portion of the procedure in order to optimize the patient for sigmoidectomy/Izabella's procedure. Plan to follow up on PMB in the outpatient setting. Sent referral for [facility] OB/GYN clinic outpatient. Clinic has been messaged and will follow up on scheduling this patient for outpatient follow up and future sampling. Gynecologic oncology will sign off at this time.    Plan discussed with Dr. Martin who is in agreement.      Faviola Arias MD PGY-1  [facility] Gynecology Oncology   Gyn Onc Pager: [phone]  05/29/25 11:39 AM

## 2025-05-29 NOTE — CODE/RAPID RESPONSE
Rapid Response Team Note    Assessment   A rapid response was called on Luz Thompson due to hypotension. Patient is a recent transfer from [facility] for possible surgical intervention.    SBP in the 60s  Dr. Diaz has ordered 1 liter NS, she was not volume responsive    RRT for sustained hypotension despite bolus. Lactic 0100 3.7. She in on ertapenem and vanco, blood cultures positive for gram negative bacilli    Plan   -  CBC, CMP, PTT, INR, type and screen  - Another liter NS  - Norepi to keep MAPS greater than 65  - CT abd/pelvis W/O    ICU attending gem, discussed with Dr. Oneal who has accepted patient to ICU  Surgical teams continue to discuss management and she may go to the OR, possibly before an ICU bed    -  The Internal Medicine primary team was able to be reached and they are in agreement with the above plan.  -  Disposition: Surgery      LIZ Milligan CNP  Rapid Response Team DOMI  Securely message with [employer]     Medical Decision Making     45 MINUTES SPENT BY ME on the date of service doing chart review, history, exam, documentation & further activities per the note.      [place] is critically ill with undifferentiated shock. These features are alarming and indicate that the patient is at imminent risk of life-threatening organ failure. Acute deterioration is being managed by the following critical interventions: IV fluids and vasopressors    Total Critical Care time spent by me, excluding procedures, was 45 minutes.    Hospital Course   Brief Summary of events leading to rapid response:   Admitted with LLQ severe abd pain, sigmoid diverticulitis with microperfusion    Physical Exam   Vital Signs: Temp: 98.3  F (36.8  C) Temp src: Oral BP: (!) 76/52 Pulse: 79   Resp: 16 SpO2: 100 % O2 Device: Nasal cannula Oxygen Delivery: 2 LPM  Weight: 182 lbs 0 oz      Exam:       Significant Results and Procedures   Lab Results   Component Value Date    WBC 9.1 05/29/2025    WBC 7.1 09/18/2020  "    Lab Results   Component Value Date    RBC 2.68 05/29/2025    RBC 3.80 09/18/2020     Lab Results   Component Value Date    HGB 7.6 05/29/2025    HGB 11.2 09/18/2020     Lab Results   Component Value Date    HCT 23.6 05/29/2025    HCT 35.0 09/18/2020     No components found for: \"MCT\"  Lab Results   Component Value Date    MCV 88 05/29/2025    MCV 92 09/18/2020     Lab Results   Component Value Date    MCH 28.4 05/29/2025    MCH 29.5 09/18/2020     Lab Results   Component Value Date    MCHC 32.2 05/29/2025    MCHC 32.0 09/18/2020     Lab Results   Component Value Date    RDW 18.3 05/29/2025    RDW 17.2 09/18/2020     Lab Results   Component Value Date     05/29/2025     09/18/2020         "

## 2025-05-29 NOTE — PROGRESS NOTES
"ICU Cross Cover Note    Patient with bradycardia to the 30's and some pauses. EKG consistent with atrial fibrillation with slow ventricular rate. The patient is asymptomatic and hemodynamically stable during these episodes, which are brief. She reports she has chronic atrial fibrillation that is \"slow and fast\". She usually only has symptoms if her heart rate is >140. She has a loop recorder and has discussed a pacemaker in the past, but has been stable without.     Given that she is asymptomatic and without hypotension during these episodes, will continue to monitor. Electrolyte replacement protocols increased from standard to high goals. Currently not requiring replacement.    Additionally, she is reporting poor pain control. She has oxycodone and Tylenol, as well as PRN dilaudid, but states that the dilaudid makes her mouth feel weird and tingly, even if premedicating with benadryl. On exam, her abdomen is soft, but diffusely tender. She is distractible from the pain. Will increase dose and frequency of oxycodone. She has an allergy to Morphine, thus will try Fentanyl boluses PRN given limited IV opioid options.    Discussed with EGS and ICU staff, Dr. Rose.  "

## 2025-05-29 NOTE — OP NOTE
OPERATIVE REPORT    DATE AND TIME OF OP NOTE/SURGERY: May 29, 2025    PREOPERATIVE DIAGNOSIS: Perforated diverticulitis, need for intraoperative ureteral identification    POSTOPERATIVE DIAGNOSIS:  Same    PROCEDURES PERFORMED:   1. Cystourethroscopy with placement of bilateral ureteral catheters    STAFF SURGEON: David Leung MD    RESIDENT: none    ANESTHESIA: General    ESTIMATED BLOOD LOSS: 1 mL    SPECIMEN: none    OPERATIVE INDICATIONS:   Luz Thompson is a 75 year old female who presented with a perforated diverticulitis and need for intraoperative ureteral identification during her colorectal surgery. The patient was counseled on the alternatives, risks, and benefits and elected to proceed with the above stated procedure.    DESCRIPTION OF PROCEDURE:    After informed consent was obtained, the patient was taken to the operating room, and moved to the operating table.  After adequate anesthesia was induced, the patient was repositioned in dorsal lithotomy position and prepped and draped in the usual sterile fashion. A timeout was taken to confirm correct patient, procedure and laterality.     A 22-Botswanan cystoscope was inserted into a well lubricated urethra. The urethra was unremarkable.  The bladder was free of tumors, stones or diverticuli.  The media was cloudy.  Bilateral ureteral orifices were orthotopic.  A Sensor guidewire was advanced into the right renal pelvis and a blue 5 fr ureteral catheter was placed in the right ureter.  The same was done on the left side with a 5 fr yellow catheter.  A 16 fr urethral catheter was placed and the stents were placed inside with an angiocatheter.  Stents were tied to the catheter as well to secure them.    Dr. Campbell continued his portion of the case    PLAN:   Stents will be removed per Dr. Campbell's discretion postoperatively

## 2025-05-29 NOTE — ANESTHESIA PROCEDURE NOTES
Airway         Procedure Start/Stop Times: 5/29/2025 9:55 AM  Staff -        CRNA: Randell Walker APRN CRNA       Performed By: CRNAIndications and Patient Condition       Indications for airway management: kayla-procedural       Induction type:intravenous       Mask difficulty assessment: 4 - unable to mask vent +/- NMBA    Final Airway Details       Final airway type: endotracheal airway       Successful airway: ETT - single  Endotracheal Airway Details        ETT size (mm): 7.0       Cuffed: yes       Successful intubation technique: direct laryngoscopy       DL Blade Type: MAC 3       Grade View of Cords: 1       Adjucts: stylet       Position: Right       Measured from: lips       Secured at (cm): 21       Bite block used: None    Post intubation assessment        Placement verified by: capnometry        Number of attempts at approach: 1       Number of other approaches attempted: 0       Secured with: tape       Ease of procedure: easy       Dentition: Intact    Medication(s) Administered   Medication Administration Time: 5/29/2025 9:55 AM

## 2025-05-29 NOTE — PROGRESS NOTES
"Hospital Medicine / Cross Cover Note  1:49 AM  5/29/2025    Subjective    Communication:  NENO Romano via secure text / pager @ ~ 1:32 AM    Gram negative bacilli in blood. - already on ertapenem and vancomycin.    Objective  /68   Pulse 72   Temp 98.3  F (36.8  C) (Oral)   Resp 18   Ht 1.702 m (5' 7\")   Wt 82.6 kg (182 lb)   LMP 06/01/1988 (Approximate)   SpO2 96%   BMI 28.51 kg/m    Not otherwise examined    Assessment  Bacteremia, already on broad-sprectrum antibiotics    Plan    Continue close observation and monitoring  Continue antibiotic(s) as ordered.    Raghu Diaz MD  Hospitalist    "

## 2025-05-29 NOTE — OP NOTE
Operative Note  Hysteroscopy with D&C    Patient: Luz Thompson  MRN: [id number]  Date: 2025     Preoperative diagnosis:  PMB  Postoperative diagnosis:  Same as above, s/p below stated procedure  Procedure:  EUA, hysteroscopy, dilation and curettage, biopsy of cervix  Surgeon:  Sharath Martin MD  Assistant: Sarah Waite DO, MPH, PGY-1***  Anesthesia:  GETA  Specimens: Endometrial curettings, ***  Complications: ***None apparent    EBL:  ***mL  IVF:  ***mL  UOP:  Patient voided prior to procedure***  Fluid deficit:  ***mL    Findings:  Exam under anesthesia revealed normal cervix, *** uterus, no adnexal masses. Hysteroscopy revealed *** uterine cavity, normal tubal ostia visualized bilaterally. D&C performed with *** return of tissue.    Indications:  Luz Thompson is a 75 year old  who is admitted for sigmoid perforation 2/2 diverticulitis and urgent open sigmoidectomy. She had two prior episodes of heavy vaginal bleeding and was seen by OB/GYN on 2025. Plan was for endometrial sampling at time of scheduled colectomy 2/2 suspected sigmoid microperforation. Patient had scheduled pelvic ultrasound on 25. She is on Eliquis 2/2 atrial fibrillation. PMH is also significant for primary biliary cirrhosis s/p liver transplant in , paroxysmal atrial fibrillation on apixaban, HTN, T2DM, CKD, HF, hx of CVA, hypothyroidism, recurrent ESBL UTIs with recent E. Faecalis bacteremia    Risks, benefits, and alternatives to the procedure were discussed with the patient who elected to proceed.  All questions were answered and an informed consent was obtained.    Procedure:  The patient was taken to the operating room where she underwent ***moderate sedation under monitored anesthesia care without difficulty.  She was placed in a dorsal lithotomy position using [place] stirrups.  The patient was examined for the above noted findings and then prepped and draped in the usual sterile fashion.  The  bladder was drained***.  A medium graves speculum was inserted into the vagina.  ***1 mL 1% plain lidocaine was injected into the cervix at 12 o'clock position. *** A tenaculum was placed horizontally*** on the anterior cervical lip.  A paracervical block was administered with a total of *** mL 1% plain lidocaine at the 4 and 8 o'clock positions. The endocervical canal was serially dilated to *** mm/fr using Hegar/Acrrington dilators.  The hysteroscope was inserted without difficulty with the above findings noted.  ***Morcellation of *** was performed using ***.  The hysteroscope was removed and sharp curettage was performed.  The tissue was sent to pathology.  All instruments were then removed.  The tenaculum was removed from the cervix and the puncture sites were hemostatic***.  The patient was repositioned to the supine position.  The patient tolerated the procedure well and was taken to the recovery room in stable condition.   *** was scrubbed and present for the entire procedure.    Sarah Waite DO, MPH  Obstetrics and Gynecology, PGY-1  05/29/25, 6:58 AM

## 2025-05-29 NOTE — PLAN OF CARE
Admitted/transferred from: [facility]  Reason for admission/transfer: Requiring small dose pressors after procedure.   Patient status upon admission/transfer: VSS, RA, A&O to self - lethargic but follows commands and opens eyes to voice. Ron in place. Abd. dressing and colostomy both clean, dry and intact. Levo at 0.07.   Interventions: Levo titrated down. 2L oxymask when sleeping. PATY drain w/serosanguinous output.   Plan: Wean Levo as tolerated. Continue post-op observation and pain management.  2 RN skin assessment: completed by Mary Anne PETTY and Candice ALY  Sexual Orientation and Gender Identity (SOGI) smartfom completed: Done  Result of skin assessment and interventions/actions: Midline incision with PATY drain - unable to visualize, new colostomy on LLQ. Scattered bruising and skin tag on L arm.  Height, weight, drug calc weight: Not Done  Patient belongings (see Flowsheet - Adult Profile for details): Hearing aids, dentures, glasses, cell phone.     Shift Events: Pt zhou down to 35bpm multiple times towards end of shift - asymptomatic, EKG obtained, MD at bedside. Rhythm back to baseline A.fib 80's. MAP wnl off Levo.                     Outcome Evaluation: Transfered from PACU. Small dose Levo, RA.

## 2025-05-29 NOTE — CONSULTS
[facility]  Transplant & Immunocompromised Infectious Disease Consult Note  Patient:  Luz Thompson Date of birth 1950 MRN [id number]  Date of Visit:  05/29/2025  Reason for Consult   gram-negative septicemia  Assessment & Plan    Recommendations/Plan     Continue combination ertapenem 1 g daily and linezolid 600 mg BID for now  I anticipate 1 week course of gram-negative coverage from day of surgery-perhaps with an oral agent (levaquin)  I anticipate 3-4 more days of enterococcal coverage now that source control has been achieved  Follow-up blood cultures to finalization, repeat blood cultures not needed unless spikes new fever.    Infectious Disease will follow with you, reach out anytime with questions or updates    Signed:  Augustin Kam MD, Available on [employer]  Staff Physician, Transplant and General Infectious Diseases   For On Call and Coverage info see [facility]-> Infectious Disease Medicine Adult/[facility]    Patient Summary:   Transplants:  5/22/2002 (Liver); POD  8408.  Coordinator Angelika Frausto  This 75-year-old liver transplant recipient has had fever of unknown origin since June 2024 and background of history of Coccidioides.  Subsequently identified to have perforated diverticulitis, failed ertapenem then tigecycline now s/p surgery on 5/29.  Assessment and Discussion     # Gram-negative septicemia  # Perforated diverticulitis s/p surgery 5/29/2025    She was on tigecycline to cover the gram-negative infection as well as Enterococcus faecalis.   It looks like the abscess had decreased in size on imaging but an abscess pocket was identified intraoperatively.  Even though it was decreasing on tigecycline I am glad she got the surgery since there was likely ongoing micro leak causing the gram-negative bacteremia.      The Enterococcus faecalis could be treated with ampicillin vancomycin or linezolid but she has a listed penicillin allergy [see below] so we are  "treating with linezolid since the course will be brief.  Based on data from the STOP- IT trial a 3 to 5-day postoperative course will likely suffice.  I doubt the Enterococcus was causing major problems at the time of her surgery and it likely had been cleared by the tigecycline.    The gram-negative organism is likely of intra-abdominal origin which is now controlled.  A 7 to 10-day course will suffice.  Levofloxacin might be an option if the patient is willing to trial this agent.  She may not need home IV therapy.    # History of Coccidioides  Diagnosed 2016, did not tolerate fluconazole so was treated with 4 oteseconazole which she stopped in 2024.  Recent cocci antigens are negative.  Typically we try to do longer prophylaxis but she is adamant about not taking voriconazole    # Recurrent UTIs  She has baseline noninfectious urinary symptoms as well as infectious urinary symptoms.  She has become increasingly colonized with resistant organisms and her antibiotic allergy labels really limit our therapy.  She follows with Dr. Lundberg in clinic and can continue to do so    #Numerous reported antimicrobial allergies  Per allergy note 8/2/2019 \"Prick and intradermal and patch testing to fluconazole, doxycycline, azithromycin, penicillins, and cephalosporins with immediate and delayed readings being negative.\" States she will never take fluconazole again. Would be candidate for oral provocation testing in future with pcn. Also notes an allergy to the  capsules, states tolerates pills or liquid formulations, sometimes needs them compounded.    She now is adamant about not taking tigecycline as it caused perioral numbness that responded to Benadryl.  This is not very biologically compatible with true allergy but nevertheless we are avoiding this agent for now.    Transplant Checklist  - Bacterial prophylaxis: None, see elsewhere for treatment Abx  - Pneumocystis (and possibly Toxo) prophylaxis: None  - Viral " serostatus & prophylaxis: CMV -, EBV + ,  - None  - Fungal prophylaxis: none  - Additional endemic disease testing/Serology: No   - Immunization status: Due for tdap   - Gamma globulin status:  Recent Labs   Lab Test 08/05/19  1552   IGG 1,110     - Last Qtc 449 5/29/25    Subjective and Objective Data     History of Present Illness        History of Presenting Illness    Luz Thompson is a 75 year old patient who presented for emergency colorectal surgery.  She follows in ID clinic for recurrent UTIs but is recently detected to have a perforated diverticulitis.  She tried treating this with IV ertapenem for a few weeks through the clinic but an interval CT scan showed enlarging of the abscess and she was found to have enterococcal bacteremia in March so she has been on tigecycline more recently.  Despite the tigecycline she has had generalized worsening of her condition and she presented with fevers chills and weakness and was found to have gram-negative bacteremia.  She was seen by colorectal surgery in the emergency room and they planned for surgical intervention which was done today.    While on her long-term tigecycline [about several weeks into this] she developed perioral numbness which was not responding to Benadryl and so the tigecycline was stopped.  When this was discussed with her yesterday by her primary team she was adamant about not trialing tigecycline again.    I am seeing her recently out of the OR in the surgical ICU.  Her daughter is at bedside and I updated her daughter.  [place]ia is able to wake up and denies any discomfort but she very quickly falls back asleep.  She is not able to significantly participate in the history.  She denies abdominal pain.    Review of Symptoms.  Unable    Physical Exam     Vital signs Temp: 97  F (36.1  C) Temp src: Axillary BP: 102/73 Pulse: 84   Resp: 12 SpO2: 96 % O2 Device: None (Room air) Oxygen Delivery: 2 LPM     GENERAL APPEARANCE: Not in acute  distress    PHYSICAL EXAM:  Eyes:     No drainage  Mouth, Throat:     Mucous membranes moist  Cardiovascular:    S1, S2 normal, regular rate and rhythm.  Respiratory:     Inspection: Not in respiratory distress, chest expansion symmetrical.   Auscultation: 4 point auscultation done clear to auscultation bilaterally  Gastrointestinal:  Incisions are present left lower quadrant ostomy is present  Musculoskeletal:     No major deformity or tender effusion  Neurologic:     Higher Mental Function: Arousable but drowsy   Motor: Moving all 4 limbs  Psychiatric: Appropriate drowsy  Skin:  Anicteric  Access: Right upper extremity PICC line does not look infected.    Data   Laboratory data and imaging listed below was reviewed prior to this encounter.   Microbiology:    Culture   Date Value Ref Range Status   05/28/2025 Culture in progress  Preliminary   05/28/2025 Positive on the 1st day of incubation (A)  Preliminary   05/28/2025 Gram negative bacilli (AA)  Preliminary     Comment:     2 of 2 bottles   05/28/2025 Positive on the 1st day of incubation (A)  Preliminary   05/28/2025 Gram negative bacilli (AA)  Preliminary     Comment:     2 of 2 bottles   05/20/2025 Culture in progress  Corrected   05/20/2025 >100,000 CFU/mL Klebsiella oxytoca ESBL (A)  Preliminary   04/14/2025 No Growth  Final   04/14/2025 <10,000 CFU/mL Urogenital louann  Final   04/14/2025 No Growth  Final   04/07/2025 50,000-100,000 CFU/mL Klebsiella oxytoca ESBL (A)  Final   04/07/2025 50,000-100,000 CFU/mL Proteus vulgaris ESBL (A)  Final   03/09/2025 No Growth  Final   03/09/2025 No Growth  Final   03/08/2025 No Growth  Final   03/08/2025 >100,000 CFU/mL Klebsiella pneumoniae (A)  Final   03/08/2025 Positive on the 1st day of incubation (A)  Final   03/08/2025 Enterococcus faecalis (AA)  Final     Comment:     2 of 2 bottles   02/07/2025 10,000-50,000 CFU/mL Mixture of Urogenital Louann  Final   01/22/2025 10,000-50,000 CFU/mL Mixture of urogenital louann   Final   01/21/2025 >100,000 CFU/mL Mixture of Urogenital Louann  Final   01/20/2025 No Growth  Final   01/20/2025 No Growth  Final   01/20/2025 No Growth  Final   01/19/2025 <10,000 CFU/mL Mixture of Urogenital Louann  Final   01/19/2025 No Growth  Final   01/19/2025 Positive on the 1st day of incubation (A)  Final   01/19/2025 Staphylococcus epidermidis (AA)  Final     Comment:     1 of 2 bottles  The recovery of this organism from a single blood culture bottle most likely represents contamination. If susceptibility testing is needed, please refer to the antibiogram or contact [facility]. If contamination is suspected, it is important to repeat two sets of blood cultures from two different sites.   01/10/2025 <10,000 CFU/mL Urogenital louann  Final   01/07/2025 No Growth  Final   01/06/2025 No Growth  Final   01/03/2025 No Growth  Final   01/03/2025 No Growth  Final   12/10/2024 No Growth  Final   12/10/2024 No Growth  Final   12/10/2024 >100,000 CFU/mL Klebsiella pneumoniae (A)  Final   11/06/2024 No Growth  Final   10/11/2024 No Growth  Final   10/11/2024 50,000-100,000 CFU/mL Mixture of Urogenital Louann  Final   09/30/2024 No Growth  Final   09/22/2024 >100,000 CFU/mL Klebsiella oxytoca ESBL (A)  Final   09/22/2024 Positive on the 1st day of incubation (A)  Final   09/22/2024 Klebsiella oxytoca ESBL (AA)  Final     Comment:     2 of 2 bottles   09/22/2024 Pseudomonas aeruginosa (AA)  Final     Comment:     1 of 2 bottles   08/20/2024 >100,000 CFU/mL Klebsiella oxytoca ESBL (A)  Final   , Inflammatory Markers:   Recent Labs   Lab Test 04/15/25  0613 08/26/19  2331 05/20/19  0957 07/30/18  0855 09/03/17  0912 09/02/17  0648 09/01/17  0832   SED 61* 78* 47* 73*  --   --   --    CRP  --  180.0* <2.9 5.2 64.0* 71.0* 51.0*   , Urine Studies:    Recent Labs   Lab Test 05/28/25  2138 05/20/25  2200 05/05/25  1800 04/29/25  0950 04/14/25  1430   LEUKEST Large* Moderate* Negative Trace* Moderate*   WBCU 79* 71* 1 5 85*   ,  Hematology Studies:    Recent Labs   Lab Test 05/29/25  1515 05/29/25  1051 05/29/25  0546 05/28/25  1343 05/27/25  1136 05/19/25  1037 05/12/25  1140 05/05/25  1102 04/28/25  1213   WBC 20.2*  --  9.1 9.3 11.3* 9.9 12.7* 15.6* 14.2*   ANEU  --   --   --  8.8* 8.7* 7.4 7.6 11.7* 10.4*   AEOS  --   --   --  0.0 0.1 0.1 0.2 0.1 0.1   HGB 8.5* 8.5* 7.6* 9.9* 11.3* 9.2* 10.0* 10.1* 9.9*   MCV 87  --  88 86 90 90 89 90 88     --  200 291 370 357 413 400 497*    , Metabolic Studies:   Recent Labs   Lab Test 05/29/25  1515 05/29/25  1051 05/28/25  2153 05/28/25  1343 05/27/25  1136 05/19/25  1037    139 138 136 136 138   POTASSIUM 4.7 4.5 4.3 4.0 4.6 5.2   CHLORIDE 110*  --  101 97* 97* 107   CO2 18*  --  21* 21* 20* 19*   BUN 85.2*  --  107.1* 117.4* 103.0* 90.6*   CR 2.37*  --  2.67* 2.99* 2.44* 2.14*   GFRESTIMATED 21*  --  18* 16* 20* 23*   , and Hepatic Studies:   Recent Labs   Lab Test 05/29/25  1515 05/28/25  1343 05/27/25  1136 05/19/25  1037 05/12/25  1140 05/05/25  1102   BILITOTAL 0.2 0.4 0.4 0.3 0.3 0.3   ALKPHOS 196* 160* 178* 118 124 101   ALBUMIN 2.4* 3.2* 3.6 3.0* 3.6 3.9   AST 68* 23 26 14 29 20   ALT 53* 27 28 22 47 14                    MDM/Coding Information     Medical Decision Making/Coding Information  I saw and evaluated this patient on todays date as part of an E&M Encounter     1- Number and Complexity of Problems Addressed as part the Encounter High    I addressed 1 or more acute or chronic illness or injury that poses a threat to life or bodily function gram-negative sepsis, major intra-abdominal infection  2- Amount and/or Complexity of Data to Be Reviewed and Analyzed High   I reviewed > 3 data points including specifically-  [the medical record for notes from other providers specifically prior infectious disease notes, notes from the primary team, colorectal notes, and recent labs including those above in data section of my note ,and incorporated these into my medical decision  making   I independently interpreted the following results CT scan from today without major intra-abdominal fluid collection  3- Risk of Complications and/or Morbidity or Mortality of Patient Management:  was high due to my recommendation for drug therapy requiring intensive monitoring (more than quarterly labs or clinical assessment)  for toxicity linezolid will require platelet and other count monitoring  4- This visit is eligible for the  Code due to complex infectious disease decision making, antimicrobial therapy and management by an Infectious Disease Physician

## 2025-05-29 NOTE — CONSULTS
Urologic Surgery Consultation Note  [facility]    Luz Thompson MRN# [id number]   Age: 75 year old YOB: 1950     Date of Admission:  5/28/2025    Reason for consult: Nephrolithiasis, recurrent UTI.       Requesting physician: Aleja Antunez MD        Level of consult: Consult, follow and place orders           Assessment:   Luz Thompson is a 75 year old female with PMH of primary biliary cirrhosis s/p liver transplant in 2002, paroxysmal atrial fibrillation on apixaban, HTN, T2DM, CKD, HF, hx of CVA, hypothyroidism, and recurrent ESBL UTIs. She is admitted to [facility] ED for abdominal pain, diverticulitis. She has been evaluated by the colorectal service and is scheduled to undergo sigmoidectomy on 5/29. Urology consulted for recurrent UTI and punctate non-obstructing kidney stones.    Overall, she is clinically stable with no fever, tachycardia, or hypotension. Labs revealed Cr of 2.99, elevated from baseline, with BUN/Cr of 40. She currently has no leukocytosis. UA/UCx is pending from this hospital stay, previously had + LE and 71 of WBC on 5/20/2025 and Klebsiella in Ucx. This was treated with IV ertapenem.     CT finds small punctate nonobstructing stones without any hydronephrosis. Given no obstruction, and the diverticulitis requiring major surgery, no indication for treatment at the moment. Once recovered from current situation, and still have recurrent UTI, can discuss in outpatient setting about treatment.     Her DERREK on CKD is likely pre renal, with elevated BUN/Cr, and high lactate, in the setting of diverticulitis and acute illness. There is no hydronephrosis on CT. Her kidneys appear atrophic with thin parenchyma, consistent with medical renal disease.    She does have history of difficulty voiding and urinary retention requiring catheterization. She states that she is voiding well right now and denies dysuria or sensation of incomplete emptying. Should  "consider checking PVR and CIC as needed until scheduled surgery, after which aj can be per primary service         Recommendations:   - Bladder scan, CIC prn  - Urology to place per op stents  - No indication for urgent urologic surgical treatment          History of Present Illness:   CC: Recurrent UTI     History is obtained from the patient    Luz Thompson is a 75 year old female with complex medical history, mentioned above. She is here with abd pain and found to have diverticulitis requiring surgery.    Urology consulted for recurrent UTI and punctate kidney stones.     Patient denies fever chills, no dysuria, hematuria. She is voiding well with no sensation of incomplete emptying.    Per chart review, did have history of reUTI, previously seen by Dr. Waddell in 2018. She has a follow up scheduled with Dr. Gutierrez on 6/10.     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     Deep venous thrombosis     Diverticulosis of sigmoid colon 12/21/2013    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, history of     Received Rituxan during Summer of 2016    Glaucoma     H/O esophageal varices     Hearing loss     Hyperlipidemia 04/10/2012    Says that she does not have it anymore, not on meds "    Hypertension     Hypertriglyceridemia     Liver replaced by transplant (H) 10/17/2011    Dr. 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             Past Surgical History:     Past Surgical History:   Procedure Laterality Date    APPENDECTOMY  1961    CATARACT IOL, RT/LT      RE12/19/2013, LE12/10/2013 - Toric lenses    CHOLECYSTECTOMY  1991    COLONOSCOPY  03/10/2014    Procedure: COLONOSCOPY;;  Surgeon: Gentry Ramirez MD;  Location: [facility] GI    CYSTOSCOPY      ear drum repair      ENDOBRONCHIAL ULTRASOUND FLEXIBLE N/A 09/29/2017    Procedure: ENDOBRONCHIAL ULTRASOUND FLEXIBLE;  Flexible Bronchoscopy, Endobronchial Ultrasound, Transbronchial Needle Aspiration ;  Surgeon: Eden Clinton MD;  Location: [facility] OR    ENDOSCOPIC RETROGRADE CHOLANGIOPANCREATOGRAM  09/19/2013    Procedure: ENDOSCOPIC RETROGRADE CHOLANGIOPANCREATOGRAM;  Endoscopic Retrograde Cholangiopancreatogram with single balloon enteroscopy, ballon sweep of bile duct;  Surgeon: Brett Membreno MD;  Location: [facility] OR     KNEE SCOPE,MED/LAT MENISECTOMY Right 08/10/2012    partial medial menisectomy only    KNEE SURGERY  1966    R knee    PICC INSERTION  09/18/2013    4fr SL PASV PICC, 40cm (1cm external) in the R basilic vein w/ tip in the low SVC    PICC INSERTION  02/21/2014    5 fr DL BioFlo Navilyst PICC, 46 cm (3 cm external) in the L basilic vein w/ tip in the SVC RA junction.    THYROIDECTOMY  03/2010    TRANSPLANT LIVER RECIPIENT LIVING  "UNRELATED  2002             Social History:     Social History     Socioeconomic History    Marital status:      Spouse name: Robbin Thompson    Number of children: 4    Years of education: 20    Highest education level: Not on file   Occupational History    Occupation: Guardian Conservator      Employer: [employer]     Comment: social work     Employer: S[employer]   Tobacco Use    Smoking status: Former     Current packs/day: 0.00     Average packs/day: 1 pack/day for 18.0 years (18.0 ttl pk-yrs)     Types: Cigarettes     Start date: 1967     Quit date: 1985     Years since quittin.1    Smokeless tobacco: Never   Vaping Use    Vaping status: Never Used   Substance and Sexual Activity    Alcohol use: Yes     Alcohol/week: 0.0 standard drinks of alcohol     Comment: rare - \"I toast at weddings\"    Drug use: No    Sexual activity: Yes     Partners: Male     Birth control/protection: Post-menopausal   Other Topics Concern    Parent/sibling w/ CABG, MI or angioplasty before 65F 55M? Not Asked     Service Not Asked    Blood Transfusions Not Asked    Caffeine Concern Not Asked    Occupational Exposure Not Asked    Hobby Hazards Not Asked    Sleep Concern Not Asked    Stress Concern Not Asked    Weight Concern Not Asked    Special Diet Not Asked    Back Care Not Asked    Exercise Yes     Comment: cardio and strengthing    Bike Helmet Not Asked    Seat Belt Not Asked    Self-Exams Not Asked   Social History Narrative    She is retired. [place]. 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     Social Drivers of Health     Financial Resource Strain: Low Risk  (4/15/2025)    Financial Resource Strain     Within the past 12 months, have you or your family members you live with been unable to get utilities (heat, electricity) when it was really needed?: No   Food " Insecurity: Low Risk  (4/15/2025)    Food Insecurity     Within the past 12 months, did you worry that your food would run out before you got money to buy more?: No     Within the past 12 months, did the food you bought just not last and you didn t have money to get more?: No   Transportation Needs: Low Risk  (4/15/2025)    Transportation Needs     Within the past 12 months, has lack of transportation kept you from medical appointments, getting your medicines, non-medical meetings or appointments, work, or from getting things that you need?: No   Recent Concern: Transportation Needs - High Risk (1/27/2025)    Transportation Needs     Within the past 12 months, has lack of transportation kept you from medical appointments, getting your medicines, non-medical meetings or appointments, work, or from getting things that you need?: Yes   Physical Activity: Insufficiently Active (11/9/2023)    Received from [facility]    Exercise Vital Sign     Days of Exercise per Week: 5 days     Minutes of Exercise per Session: 10 min   Stress: No Stress Concern Present (11/9/2023)    Received from [facility]    [place] [employer] - Occupational Stress Questionnaire     Feeling of Stress : Only a little   Social Connections: Feeling Socially Integrated (12/20/2023)    Received from [facility]    OASIS : Social Isolation     Frequency of experiencing loneliness or isolation: Never   Interpersonal Safety: Low Risk  (4/15/2025)    Interpersonal Safety     Do you feel physically and emotionally safe where you currently live?: Yes     Within the past 12 months, have you been hit, slapped, kicked or otherwise physically hurt by someone?: No     Within the past 12 months, have you been humiliated or emotionally abused in other ways by your partner or ex-partner?: No   Housing Stability: Low Risk  (4/15/2025)    Housing Stability     Do you have housing? : Yes     Are you worried about losing your housing?: No    Recent Concern: Housing Stability - High Risk (2025)    Housing Stability     Do you have housing? : No     Are you worried about losing your housing?: No             Family History:     Family History   Problem Relation Age of Onset    Hypertension Mother     Endometrial Cancer Mother     Hyperlipidemia Mother     Prostate Cancer Father     Macular Degeneration Father     Cancer - colorectal Maternal Grandmother         in her 80's, has surgery and removal of part of kidney,  at age 98    Heart Disease Maternal Grandfather          at 98    Glaucoma Maternal Grandfather     Cerebrovascular Disease Paternal Grandmother         in her 80's    Hypertension Paternal Grandmother     Heart Disease Paternal Grandfather         MI    Alzheimer Disease Paternal Grandfather     Allergies Son     Neurologic Disorder Daughter         Migraines    Breast Cancer Other     Anesthesia Reaction No family hx of     Crohn's Disease No family hx of     Ulcerative Colitis No family hx of     Melanoma No family hx of     Skin Cancer No family hx of              Allergies:      Allergies   Allergen Reactions    Fluconazole Hives and Itching     Full body hives  **Intradermal skin testing negative**  [See intradermal skin testing results from 2019]    Mycophenolate Diarrhea and Nausea and Vomiting     Patient stated it was chronic and lasted months      Penicillins Anaphylaxis, Hives, Itching and Rash     **Intradermal skin testing negative**  [See intradermal skin testing results from 2019]      Simvastatin Muscle Pain (Myalgia)     severe  Other reaction(s): Myalgia caused by statin    Methotrexate Other (See Comments)     Other reaction(s): Sore  Sores in mouth, esophagus, and stomach.       Morphine And Codeine Itching and Other (See Comments)     Psych disturbance  Other reaction(s): Confusion, Mood alteration    Quinolones Anxiety, Dizziness, Headache, Other (See Comments), Palpitations and Unknown     Other  reaction(s): Hyperactive behavior, Lightheadedness, Mood alteration    Dizzy, light headed    Dizziness, shaky, and jumpy    Capsules, Empty Gelatin [Gelatin]     Carvedilol Diarrhea     Per pt - severe diarrhea and LE swelling  + tolerating Toprol    Lansoprazole Diarrhea    Strawberry Extract     Azithromycin Itching     [See intradermal skin testing results from 8/2/2019]    Bactrim [Sulfamethoxazole-Trimethoprim] Other (See Comments)     Numb mouth, tingling lips (treated with anti-histamines)    Cephalosporins Itching     [See intradermal skin testing results from 8/2/2019]    Ciprofloxacin Hcl Other (See Comments) and Dizziness     Insomnia, mood lability, Irregular heart beat         Doxycycline Itching and Unknown     [See intradermal skin testing results from 8/2/2019]    Lisinopril Cough    Omeprazole Itching    Tolectin [Nsaids] Rash    Tolmetin Rash and Itching    Tramadol Rash, Hives and Itching             Medications:     Current Facility-Administered Medications   Medication Dose Route Frequency Provider Last Rate Last Admin    acetaminophen (TYLENOL) tablet 975 mg  975 mg Oral BID Jah Bettencourt PA-C        albuterol (PROVENTIL) neb solution 2.5 mg  2.5 mg Nebulization Q4H PRN Jah Bettencourt PA-C        [Held by provider] amLODIPine (NORVASC) tablet 5 mg  5 mg Oral Daily Jah Bettencourt PA-C        [Held by provider] apixaban ANTICOAGULANT (ELIQUIS) tablet 2.5 mg  2.5 mg Oral BID Jah Bettencourt PA-C        [START ON 5/29/2025] calcitRIOL (ROCALTROL) capsule 0.25 mcg  0.25 mcg Oral Daily Jah Bettencourt PA-C        calcium carbonate (TUMS) chewable tablet 1,000 mg  1,000 mg Oral Q4H PRN Jah Bettencourt PA-C        glucose gel 15-30 g  15-30 g Oral Q15 Min PRN Jah Bettencourt PA-C        Or    dextrose 50 % injection 25-50 mL  25-50 mL Intravenous Q15 Min PRN Jah Bettencourt PA-C        Or    glucagon injection 1 mg  1 mg Subcutaneous Q15 Min PRN Jah Bettencourt PA-C         diclofenac (VOLTAREN) 1 % topical gel 4 g  4 g Topical 4x Daily Jah Bettencourt PA-C        diphenhydrAMINE (BENADRYL) tablet 25 mg  25 mg Oral Q6H PRN Jah Bettencourt PA-C        ertapenem (INVanz) 500 mg in sodium chloride 0.9 % 50 mL intermittent infusion  500 mg Intravenous Q24H Samson Estes MD   Stopped at 05/28/25 1826    [START ON 5/30/2025] estradiol (ESTRACE) cream 2 g  2 g Vaginal Once per day on Monday Wednesday Friday Jah Bettencourt PA-C        ezetimibe (ZETIA) tablet 10 mg  10 mg Oral At Bedtime Jah Bettencourt PA-C        Ferrous Sulfate TBEC 1 tablet  1 tablet Oral At Bedtime Jah Bettencourt PA-C        folic acid (FOLVITE) tablet 1,000 mcg  1,000 mcg Oral At Bedtime Jah Bettencourt PA-C        gabapentin (NEURONTIN) solution 300 mg  300 mg Oral At Bedtime Jah Bettencourt PA-C        [Held by provider] hydrALAZINE (APRESOLINE) tablet 50 mg  50 mg Oral TID Jah Bettencourt PA-C        insulin aspart (NovoLOG) injection (RAPID ACTING)  1-3 Units Subcutaneous TID AC Jah Bettencourt PA-C        insulin aspart (NovoLOG) injection (RAPID ACTING)  1-3 Units Subcutaneous At Bedtime Jah Bettencourt PA-C        lactated ringers infusion   Intravenous Continuous Jah Bettencourt PA-C   Stopped at 05/28/25 1827    [START ON 5/29/2025] levothyroxine (SYNTHROID/LEVOTHROID) tablet 175 mcg  175 mcg Oral Daily Jah Bettencourt PA-C        lidocaine (LMX4) cream   Topical Q1H PRN Jah Bettencourt PA-C        lidocaine 1 % 0.1-1 mL  0.1-1 mL Other Q1H PRN Jah Bettencourt PA-C        melatonin tablet 1 mg  1 mg Oral At Bedtime PRN Jah Bettencourt PA-C        [START ON 5/29/2025] metoprolol succinate ER (TOPROL XL) 24 hr tablet 25 mg  25 mg Oral Daily Jah Bettencourt PA-C        [START ON 5/30/2025] Nutrisource Fiber PO packet 1 each  1 packet Oral Daily Jah Bettencourt PA-C        nystatin (MYCOSTATIN) suspension 1,000,000 Units  1,000,000 Units Oral 4x Daily Jah Bettencourt PA-C         ondansetron (ZOFRAN) injection 4 mg  4 mg Intravenous Q6H PRN Jah Bettencourt PA-C        oxyCODONE IR (ROXICODONE) half-tab 2.5 mg  2.5 mg Oral Q4H PRN Jah Bettencourt PA-C        [START ON 5/29/2025] predniSONE (DELTASONE) half-tab 9 mg  9 mg Oral Daily Luzma Osman APRN CNP        [START ON 5/29/2025] sertraline (ZOLOFT) tablet 50 mg  50 mg Oral Daily Jah Bettencourt PA-C        sodium chloride (PF) 0.9% PF flush 3 mL  3 mL Intracatheter Q8H LUZMARIA Jah Bettencourt PA-C        sodium chloride (PF) 0.9% PF flush 3 mL  3 mL Intracatheter q1 min prn Jah Bettencourt PA-C        tacrolimus (GENERIC EQUIVALENT) capsule 0.5 mg  0.5 mg Oral BID IS Luzma Osman APRN CNP        ursodiol (ACTIGALL) tablet 250 mg  250 mg Oral BID Jah Bettencourt PA-C         Current Outpatient Medications   Medication Sig Dispense Refill    acetaminophen (TYLENOL) 500 MG tablet Take 1,000 mg by mouth 2 times daily. During 4/16/25 [facility] pt states take BID everyday      amLODIPine (NORVASC) 5 MG tablet Take 1 tablet (5 mg) by mouth daily. HOLD if SBP<100 30 tablet 2    apixaban ANTICOAGULANT (ELIQUIS) 2.5 MG tablet Take 1 tablet (2.5 mg) by mouth 2 times daily. 60 tablet 0    calcitRIOL (ROCALTROL) 0.25 MCG capsule Take 1 capsule (0.25 mcg) by mouth daily. 90 capsule 1    calcium carbonate (TUMS) 500 MG chewable tablet Take 2 tablets (1,000 mg) by mouth every 4 hours as needed for heartburn.      diclofenac (VOLTAREN) 1 % topical gel Apply 4 g topically 4 times daily.      diphenhydrAMINE (BENADRYL) 25 MG tablet Take 25 mg by mouth every 6 hours as needed for itching or allergies.      estradiol (ESTRACE) 0.1 MG/GM vaginal cream Place vaginally three times a week.      ezetimibe (ZETIA) 10 MG tablet Take 1 tablet (10 mg) by mouth daily. 90 tablet 3    Ferrous Sulfate 324 MG TBEC Take 1 tablet by mouth daily.      folic acid (FOLVITE) 1 MG tablet TAKE 1 TABLET BY MOUTH DAILY 90 tablet 3    gabapentin (NEURONTIN) 250  MG/5ML solution Take 6 mLs (300 mg) by mouth at bedtime 180 mL 0    hydrALAZINE (APRESOLINE) 50 MG tablet Take 1 tablet (50 mg) by mouth 3 times daily. hold if SBP <100mmHg.      ketoconazole (NIZORAL) 2 % external shampoo Apply topically twice a week. Lather in the shower, wait 3-5 minutes before rinsing. (Patient taking differently: Apply topically twice a week. On Wed and Sat  Lather in the shower, wait 3-5 minutes before rinsing.)      levothyroxine (SYNTHROID/LEVOTHROID) 175 MCG tablet Take 1 tablet (175 mcg) by mouth daily. 90 tablet 1    linagliptin (TRADJENTA) 5 MG TABS tablet Take 1 tablet (5 mg) by mouth daily. 90 tablet 1    metoprolol succinate ER (TOPROL XL) 25 MG 24 hr tablet Take 25 mg by mouth daily. HOLD if SBP<100 and/or HR<55      Multiple Vitamins-Minerals (PRESERVISION AREDS 2) CAPS Take 1 capsule by mouth 2 times daily      nystatin (MYCOSTATIN) 027837 UNIT/ML suspension Take 1,000,000 Units by mouth 4 times daily.      omega-3 acid ethyl esters (LOVAZA) 1 g capsule Take 1 capsule (1 g) by mouth 2 times daily.      predniSONE (DELTASONE) 1 MG tablet Take 4 tablets (4 mg) by mouth daily. Take 4mg (1mg tablets x4) and Take with 5mg tablet to equal 9mg daily 90 tablet 0    predniSONE (DELTASONE) 5 MG tablet Take 1 tablet (5 mg) by mouth daily. Take with 5mg tablet with 4mg (1mg tablets x4) to equal 9mg daily 30 tablet 0    sertraline (ZOLOFT) 50 MG tablet Take 1 tablet (50 mg) by mouth daily. 30 tablet 0    sirolimus (GENERIC EQUIVALENT) 1 MG tablet Take 1 tablet (1 mg) by mouth daily. 90 tablet 3    tacrolimus (GENERIC EQUIVALENT) 0.5 MG capsule Take 1 capsule (0.5 mg) by mouth every 12 hours. 60 capsule 11    ursodiol (ACTIGALL) 250 MG tablet Take 1 tablet (250 mg) by mouth 2 times daily. 180 tablet 3    albuterol (PROAIR HFA/PROVENTIL HFA/VENTOLIN HFA) 108 (90 Base) MCG/ACT inhaler Inhale 2 puffs into the lungs every 4 hours as needed for shortness of breath, wheezing or cough. 18 g 0     Continuous Glucose Sensor (FREESTYLE NINO 2 SENSOR) [facility] 14 days. 2 each 5    D-MANNOSE PO Take 1 Scoop by mouth 2 times daily.      EPINEPHrine (ANY BX GENERIC EQUIV) 0.3 MG/0.3ML injection 2-pack Inject 0.3 mLs (0.3 mg) into the muscle as needed for anaphylaxis. May repeat one time in 5-15 minutes if response to initial dose is inadequate. 2 each 1    evolocumab (REPATHA SURECLICK) 140 MG/ML prefilled autoinjector Inject 1 mL (140 mg) subcutaneously every 14 days. . Please have fasting labs drawn for further refills. 6 mL 3    Glucagon (GVOKE HYPOPEN) 1 MG/0.2ML pen Inject the contents of 1 device under the skin into lower abdomen, outer thigh, or outer upper arm as needed for hypoglycemia. If no response after 15 minutes, additional 1 mg dose from a new device may be injected while waiting for emergency assistance.      glucose (BD GLUCOSE) 5 g chewable tablet Take 2 tablets (10 g) by mouth as needed (low blood sugar) 40 tablet 1    glucose 40 % (400 mg/mL) gel Take 15-30 g by mouth every 15 minutes as needed for low blood sugar.      magnesium citrate 1.745 GM/30ML solution Drink 1 bottle at 8pm the night before surgery 296 mL 0    metroNIDAZOLE (FLAGYL) 500 MG tablet Take 1 tablet (500 mg) by mouth every 6 hours. At 8:00 am, 2:00 pm, 8:00 pm the day prior to your surgery with neomycin and zofran. 3 tablet 0    neomycin (MYCIFRADIN) 500 MG tablet Take 2 tablets (1,000 mg) by mouth every 6 hours. At 8:00 am, 2:00 pm, 8:00 pm the day prior to your surgery with flagyl and zofran. 6 tablet 0    NONFORMULARY Apply 1 Application topically daily as needed. Momma Bear Nerve Lotion - pt uses on feet      Nutrisource Fiber PO packet Take 1 packet by mouth daily. Benefiber      ondansetron (ZOFRAN ODT) 4 MG ODT tab Take 1 tablet (4 mg) by mouth every 6 hours as needed.      ondansetron (ZOFRAN) 4 MG tablet Take 1 tablet (4 mg) by mouth every 6 hours. At 8:00 am, 2:00 pm, 8:00 pm the day prior to your surgery  "with neomycin and flagyl. 3 tablet 0    oxymetazoline (AFRIN) 0.05 % nasal spray Spray 0.2 mLs (2 sprays) into both nostrils 2 times daily as needed for congestion. 30 mL 0    polyethylene glycol (MIRALAX) 17 GM/Dose powder Please take 238 grams mixed with 64 oz of Gatorade at 4pm the night before surgery 238 g 0    triamcinolone (KENALOG) 0.1 % external ointment Apply topically 2 times daily. Use 2 weeks or less. 80 g 0             Review of Systems:      All other review of systems negative, except for what is mentioned above        Physical Exam:   /50   Pulse 87   Temp 98.4  F (36.9  C) (Oral)   Resp 18   Ht 1.702 m (5' 7\")   Wt 82.6 kg (182 lb)   LMP 06/01/1988 (Approximate)   SpO2 95%   BMI 28.51 kg/m    GEN: AAO*3, not in acute distress, lying comfortably  HEENT: atraumatic, mucosa moist  CVS: normal heart rate, perfusing extremeties  RESP: no resp distress, satting well on RA  ABD: soft, nontender, nondistended   EXT: Extremities atraumatic, grossly normal range of motion  NEURO/PSYCH: CN grossly normal, no focal weakness, normal mood and thought process              Data:     Recent Labs   Lab Test 05/28/25  1343 05/27/25  1136 05/19/25  1037   WBC 9.3 11.3* 9.9   HGB 9.9* 11.3* 9.2*   CR 2.99* 2.44* 2.14*         CT Abdomen Pelvis w/o Contrast 05/28/2025    Narrative  EXAM: CT ABDOMEN PELVIS W/O CONTRAST  LOCATION: [facility]  DATE: 5/28/2025    INDICATION: abd pain , diarrhea, known abscess  COMPARISON: 5/22/2025  TECHNIQUE: CT scan of the abdomen and pelvis was performed without IV contrast. Multiplanar reformats were obtained. Dose reduction techniques were used.  CONTRAST: None.    FINDINGS:  LOWER CHEST: Small right pleural effusion, decreased compared to prior. Left pleural effusion has resolved. At least moderate coronary artery calcifications. Unchanged groundglass and tree-in-bud opacities in the right lower lobe, with some " associated  calcifications.    HEPATOBILIARY: Prior hepatic transplant. Pneumobilia and an area of biliary dilation in the segment 5, both unchanged.    PANCREAS: Fatty infiltration without ductal dilatation. 1.5 cm possible IPMN in the uncinate process (5/91) is unchanged.    SPLEEN: Unremarkable    ADRENAL GLANDS: Unremarkable    KIDNEYS/BLADDER: No significant mass, stones, or hydronephrosis. There are simple or benign cysts. No follow up is needed. Punctate nonobstructing intrarenal calculus in the left kidney.    BOWEL: Multiple colonic diverticula. No acute inflammation or obstruction. Decreasing size of pericolonic fluid collection, now 1.4 cm, previously 1.6 cm, prior to that 2.5 cm.    LYMPH NODES: No adenopathy    VASCULATURE: Atherosclerotic disease without abdominal aortic aneurysm.    PELVIC ORGANS: Atrophic uterus.    MUSCULOSKELETAL: Diffuse osteopenia. Multilevel spondylosis. There are several small to moderate-sized fat-containing ventral hernias.    Impression  IMPRESSION:  1.  No acute findings in the abdomen or pelvis.  2.  Decreasing size of pericolonic fluid collection.  3.  Small right pleural effusion, decreased compared to prior. Left pleural effusion has resolved.  4.  Unchanged groundglass and tree-in-bud opacities in the right lower lobe.           Will discuss with Dr. Madhu Billy MD   PGY3 Urology  [facility]

## 2025-05-29 NOTE — OR NURSING
Patient presented for care with bilateral hearing aids. Patient hearing aids were removed at beginning of case and placed in specimen cup with lid screwed on. Cup was labeled with patient label. Hearing aids were handed off to PACU RN at conclusion of case.

## 2025-05-29 NOTE — PHARMACY-VANCOMYCIN DOSING SERVICE
Pharmacy Vancomycin Initial Note  Date of Service May 28, 2025  Patient's  1950  75 year old, female    Indication: Intra-abdominal infection    Current estimated CrCl = Estimated Creatinine Clearance: 17.7 mL/min (A) (based on SCr of 2.99 mg/dL (H)).    Creatinine for last 3 days  2025: 11:36 AM Creatinine 2.44 mg/dL  2025:  1:43 PM Creatinine 2.99 mg/dL    Recent Vancomycin Level(s) for last 3 days  No results found for requested labs within last 3 days.      Vancomycin IV Administrations (past 72 hours)        No vancomycin orders with administrations in past 72 hours.                    Nephrotoxins and other renal medications (From now, onward)      Start     Dose/Rate Route Frequency Ordered Stop    25 1800  tacrolimus (GENERIC EQUIVALENT) capsule 0.5 mg         0.5 mg Oral 2 TIMES DAILY. 25 1448              Contrast Orders - past 72 hours (72h ago, onward)      None                  Plan:  Start vancomycin  750 mg IV once.   Vancomycin monitoring method: Intermittent based on levels  Vancomycin therapeutic monitoring goal: 10-15 mg/L  Pharmacy will check vancomycin levels as appropriate in 1-3 Days.    Serum creatinine levels will be ordered daily for the first week of therapy and at least twice weekly for subsequent weeks.      Pranav Dumont, [facility]

## 2025-05-29 NOTE — CONSULTS
WOC Preoperative Ostomy    Diagnosis: Diverticulitis    Date of Surgery: 5/29      Type of Surgery: Hartmanns procedure   Emotional readiness for surgery: withdrawn, denial, and tearful    *pt is not wanting to have ostomy and is upset about the surgery happening today. She is hopeful that the surgery will be temporary    Physical Limitations: Without limitations  Abdomen assessed for site by: Patient's ability to visiualize area, avoidance of skin creases and scars, palpating for rectus abdominus muscle, and clothing fit  Teaching: role of WOC/postop followup explained  Stoma site marking: Method used: marking pen     Location chosen: LLQ #1 marked on all 4 quadrants to avoid large crease at umbilicus

## 2025-05-29 NOTE — ED TRIAGE NOTES
Received report from EMS    Pt in for ED -> ED transfer for joesphpam in AM. NPO @ midnight. AO4, VSS. RA. Denies pain.

## 2025-05-29 NOTE — ANESTHESIA POSTPROCEDURE EVALUATION
Patient: Luz Thompson    Procedure: Procedure(s):  bilateral Insert stent ureter, cystoscopy  Open sigmoidectomy with end colostmy       Anesthesia Type:  General    Note:  Disposition: Inpatient; Admission; ICU            ICU Sign Out: Anesthesiologist/ICU physician sign out WAS performed   Postop Pain Control: Uneventful            Sign Out: Well controlled pain   PONV: No   Neuro/Psych: Uneventful            Sign Out: Acceptable/Baseline neuro status   Airway/Respiratory: Uneventful            Sign Out: Acceptable/Baseline resp. status   CV/Hemodynamics: Uneventful            Sign Out: Acceptable CV status; No obvious hypovolemia; No obvious fluid overload   Other NRE: NONE   DID A NON-ROUTINE EVENT OCCUR?            Last vitals:  Vitals Value Taken Time   /78 05/29/25 13:15   Temp 36.9  C (98.4  F) 05/29/25 12:22   Pulse 88 05/29/25 13:27   Resp 12 05/29/25 13:27   SpO2 100 % 05/29/25 13:21   Vitals shown include unfiled device data.    Electronically Signed By: Kelvin Caro MD  May 29, 2025  1:28 PM

## 2025-05-29 NOTE — CONSULTS
"Gynecologic Oncology Progress Note  Patient: Luz Thompson  : 1950  Date of Visit: May 29, 2025       History of Present Illness:  Chart review:   Luz Thompson is a patient with history of liver transplant, CKD, CVA, recent history of PMB (2 episodes bleeding-soaking underwent). Has been on eliquis prior to this admission. Was seen by OBGYN on  for PMB-recommended endometrial sampling in coordination with upcoming scheduled procedure with CRS. Also recommended for TVUS, however patient admitted for sigmoid perforation from diverticulitis and planned for urgent open sigmoidectomy with CRS today. Patient was unable to tolerate pelvic US.     Exam in the office is documented as normal appearing cervix, but narrow vaginal introitus, and cervix nearly flush with vagina.     Patient coroborates above history. No recent vaginal bleeding. No heavy bleeding.     Past Medical History:   Diagnosis Date    Anemia of chronic disease 10/17/2011    Anxiety     CKD (chronic kidney disease) stage 3, GFR 30-59 ml/min (H) 2012    Coccidioidomycosis, history of 2017    CVA (cerebral vascular accident) (H)     when BP was very low, small multiple infacts in frontal lobe, had \"visual field cut,\" leg weakness, and expressive aphasia - all have resolved.     Deep venous thrombosis     Diverticulosis of sigmoid colon 2013    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, history of     Received Rituxan during Summer of     Glaucoma     H/O esophageal varices     Hearing loss     Hyperlipidemia 04/10/2012    Says that she does not have it anymore, not on meds    Hypertension     Hypertriglyceridemia     Liver replaced by transplant (H) 10/17/2011    Dr. 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       Past Surgical History:   Procedure Laterality Date    APPENDECTOMY      CATARACT IOL, RT/LT      RE2013, LE12/10/2013 - Toric lenses    CHOLECYSTECTOMY      COLONOSCOPY  03/10/2014    Procedure: COLONOSCOPY;;  Surgeon: Gentry Ramirez MD;  Location: [facility] GI    CYSTOSCOPY      ear drum repair      ENDOBRONCHIAL ULTRASOUND FLEXIBLE N/A 2017    Procedure: ENDOBRONCHIAL ULTRASOUND FLEXIBLE;  Flexible Bronchoscopy, Endobronchial Ultrasound, Transbronchial Needle Aspiration ;  Surgeon: Eden Clinton MD;  Location: [facility] OR    ENDOSCOPIC RETROGRADE CHOLANGIOPANCREATOGRAM  2013    Procedure: ENDOSCOPIC RETROGRADE CHOLANGIOPANCREATOGRAM;  Endoscopic Retrograde Cholangiopancreatogram with single balloon enteroscopy, ballon sweep of bile duct;  Surgeon: Brett Membreno MD;  Location: [facility] OR    HC KNEE SCOPE,MED/LAT MENISECTOMY Right 08/10/2012    partial medial menisectomy only    KNEE SURGERY  1966    R knee    PICC INSERTION  2013    4fr SL PASV PICC, 40cm (1cm external) in the R basilic vein w/ tip in the low SVC    PICC INSERTION  2014    5 fr DL BioFlo Navilyst PICC, 46 cm (3 cm external) in the L basilic vein w/ tip in the SVC RA junction.    THYROIDECTOMY  2010    TRANSPLANT LIVER RECIPIENT LIVING UNRELATED  2002        Social History     Tobacco Use    Smoking status: Former     Current packs/day: 0.00     Average packs/day: 1 pack/day for 18.0 years (18.0 ttl pk-yrs)     Types: Cigarettes     Start date: 1967     Quit date: 1985     Years since quittin.1    Smokeless tobacco: Never   Vaping Use    Vaping status: Never Used   Substance Use Topics    Alcohol  "use: Yes     Alcohol/week: 0.0 standard drinks of alcohol     Comment: rare - \"I toast at weddings\"    Drug use: No   Current living situation: TCU currently      Family History   Problem Relation Age of Onset    Hypertension Mother     Endometrial Cancer Mother     Hyperlipidemia Mother     Prostate Cancer Father     Macular Degeneration Father     Cancer - colorectal Maternal Grandmother         in her 80's, has surgery and removal of part of kidney,  at age 98    Heart Disease Maternal Grandfather          at 98    Glaucoma Maternal Grandfather     Cerebrovascular Disease Paternal Grandmother         in her 80's    Hypertension Paternal Grandmother     Heart Disease Paternal Grandfather         MI    Alzheimer Disease Paternal Grandfather     Allergies Son     Neurologic Disorder Daughter         Migraines    Breast Cancer Other     Anesthesia Reaction No family hx of     Crohn's Disease No family hx of     Ulcerative Colitis No family hx of     Melanoma No family hx of     Skin Cancer No family hx of          Physical Exam:   /68   Pulse 72   Temp 98.3  F (36.8  C) (Oral)   Resp 18   Ht 1.702 m (5' 7\")   Wt 82.6 kg (182 lb)   LMP 1988 (Approximate)   SpO2 96%   BMI 28.51 kg/m    Exam deferred        Assessment:  Luz Thompson is a patient with complex medical history going to the OR with colorectal surgery for urgent open sigmoidectomy with CRS for microperforation of sigmoid colon 2/2 diverticulitis. History of recent PMB and had previously been recommended to undergo endometrial sampling by general gynecology, thus we were consulted for consideration given procedure at our location.     Plan:   - Upon meeting the patient on 25, she is requiring pressors due to hypotension. Given she is unstable, plan to defer sampling at this time.   - CT images were reviewed with small uterus. If she has additional vaginal bleeding, attempt at sampling can be performed at a later time. "     Sharath Martin MD  Gynecologic Oncology     I discussed this with the colorectal surgeon who agrees that it is important to expedite her colon resection and minimize time in OR. Given non-urgent need for sampling, we both agree this should be deferred at this time.

## 2025-05-30 ENCOUNTER — APPOINTMENT (OUTPATIENT)
Dept: OCCUPATIONAL THERAPY | Facility: CLINIC | Age: 75
DRG: 329 | End: 2025-05-30
Attending: PHYSICIAN ASSISTANT
Payer: MEDICARE

## 2025-05-30 LAB
ANION GAP SERPL CALCULATED.3IONS-SCNC: 13 MMOL/L (ref 7–15)
BACTERIA UR CULT: NORMAL
BUN SERPL-MCNC: 78.8 MG/DL (ref 8–23)
CA-I BLD-MCNC: 4.5 MG/DL (ref 4.4–5.2)
CALCIUM SERPL-MCNC: 7.9 MG/DL (ref 8.8–10.4)
CHLORIDE SERPL-SCNC: 111 MMOL/L (ref 98–107)
CREAT SERPL-MCNC: 2.21 MG/DL (ref 0.51–0.95)
EGFRCR SERPLBLD CKD-EPI 2021: 22 ML/MIN/1.73M2
ERYTHROCYTE [DISTWIDTH] IN BLOOD BY AUTOMATED COUNT: 18.8 % (ref 10–15)
GLUCOSE BLDC GLUCOMTR-MCNC: 128 MG/DL (ref 70–99)
GLUCOSE BLDC GLUCOMTR-MCNC: 138 MG/DL (ref 70–99)
GLUCOSE BLDC GLUCOMTR-MCNC: 158 MG/DL (ref 70–99)
GLUCOSE BLDC GLUCOMTR-MCNC: 159 MG/DL (ref 70–99)
GLUCOSE BLDC GLUCOMTR-MCNC: 165 MG/DL (ref 70–99)
GLUCOSE BLDC GLUCOMTR-MCNC: 178 MG/DL (ref 70–99)
GLUCOSE SERPL-MCNC: 161 MG/DL (ref 70–99)
HCO3 SERPL-SCNC: 17 MMOL/L (ref 22–29)
HCT VFR BLD AUTO: 27 % (ref 35–47)
HGB BLD-MCNC: 8.6 G/DL (ref 11.7–15.7)
MAGNESIUM SERPL-MCNC: 2.1 MG/DL (ref 1.7–2.3)
MCH RBC QN AUTO: 27.8 PG (ref 26.5–33)
MCHC RBC AUTO-ENTMCNC: 31.9 G/DL (ref 31.5–36.5)
MCV RBC AUTO: 87 FL (ref 78–100)
PHOSPHATE SERPL-MCNC: 4.9 MG/DL (ref 2.5–4.5)
PLATELET # BLD AUTO: 207 10E3/UL (ref 150–450)
POTASSIUM SERPL-SCNC: 4.3 MMOL/L (ref 3.4–5.3)
RBC # BLD AUTO: 3.09 10E6/UL (ref 3.8–5.2)
SODIUM SERPL-SCNC: 141 MMOL/L (ref 135–145)
TACROLIMUS BLD-MCNC: <1 UG/L (ref 5–15)
TME LAST DOSE: ABNORMAL H
TME LAST DOSE: ABNORMAL H
VANCOMYCIN SERPL-MCNC: 5.4 UG/ML (ref ?–25)
WBC # BLD AUTO: 20.2 10E3/UL (ref 4–11)

## 2025-05-30 PROCEDURE — 250N000011 HC RX IP 250 OP 636

## 2025-05-30 PROCEDURE — 99233 SBSQ HOSP IP/OBS HIGH 50: CPT | Mod: 24 | Performed by: STUDENT IN AN ORGANIZED HEALTH CARE EDUCATION/TRAINING PROGRAM

## 2025-05-30 PROCEDURE — 258N000003 HC RX IP 258 OP 636

## 2025-05-30 PROCEDURE — 250N000013 HC RX MED GY IP 250 OP 250 PS 637

## 2025-05-30 PROCEDURE — 250N000011 HC RX IP 250 OP 636: Mod: JZ | Performed by: STUDENT IN AN ORGANIZED HEALTH CARE EDUCATION/TRAINING PROGRAM

## 2025-05-30 PROCEDURE — 82330 ASSAY OF CALCIUM: CPT

## 2025-05-30 PROCEDURE — G0463 HOSPITAL OUTPT CLINIC VISIT: HCPCS

## 2025-05-30 PROCEDURE — 97165 OT EVAL LOW COMPLEX 30 MIN: CPT | Mod: GO

## 2025-05-30 PROCEDURE — 97530 THERAPEUTIC ACTIVITIES: CPT | Mod: GO

## 2025-05-30 PROCEDURE — 999N000248 HC STATISTIC IV INSERT WITH US BY RN

## 2025-05-30 PROCEDURE — 250N000013 HC RX MED GY IP 250 OP 250 PS 637: Performed by: INTERNAL MEDICINE

## 2025-05-30 PROCEDURE — 250N000011 HC RX IP 250 OP 636: Performed by: INTERNAL MEDICINE

## 2025-05-30 PROCEDURE — 250N000009 HC RX 250

## 2025-05-30 PROCEDURE — 80197 ASSAY OF TACROLIMUS: CPT | Performed by: STUDENT IN AN ORGANIZED HEALTH CARE EDUCATION/TRAINING PROGRAM

## 2025-05-30 PROCEDURE — 250N000013 HC RX MED GY IP 250 OP 250 PS 637: Performed by: PHYSICIAN ASSISTANT

## 2025-05-30 PROCEDURE — 80048 BASIC METABOLIC PNL TOTAL CA: CPT

## 2025-05-30 PROCEDURE — 250N000011 HC RX IP 250 OP 636: Mod: JZ | Performed by: FAMILY MEDICINE

## 2025-05-30 PROCEDURE — 999N000202 HC STATISTICAL VASC ACCESS NURSE TIME, 1-15 MINUTES

## 2025-05-30 PROCEDURE — 3E043XZ INTRODUCTION OF VASOPRESSOR INTO CENTRAL VEIN, PERCUTANEOUS APPROACH: ICD-10-PCS | Performed by: SURGERY

## 2025-05-30 PROCEDURE — 80202 ASSAY OF VANCOMYCIN: CPT | Performed by: INTERNAL MEDICINE

## 2025-05-30 PROCEDURE — 250N000013 HC RX MED GY IP 250 OP 250 PS 637: Performed by: STUDENT IN AN ORGANIZED HEALTH CARE EDUCATION/TRAINING PROGRAM

## 2025-05-30 PROCEDURE — G0545 PR INHRENT VISIT TO INPT/OBS W CNFRM/SUSPCT INFCT DIS BY INFCT DIS SPCIALST: HCPCS | Performed by: STUDENT IN AN ORGANIZED HEALTH CARE EDUCATION/TRAINING PROGRAM

## 2025-05-30 PROCEDURE — 99232 SBSQ HOSP IP/OBS MODERATE 35: CPT | Mod: 24 | Performed by: SURGERY

## 2025-05-30 PROCEDURE — 84100 ASSAY OF PHOSPHORUS: CPT | Performed by: INTERNAL MEDICINE

## 2025-05-30 PROCEDURE — 258N000003 HC RX IP 258 OP 636: Performed by: INTERNAL MEDICINE

## 2025-05-30 PROCEDURE — 200N000002 HC R&B ICU UMMC

## 2025-05-30 PROCEDURE — 258N000003 HC RX IP 258 OP 636: Performed by: FAMILY MEDICINE

## 2025-05-30 PROCEDURE — 83735 ASSAY OF MAGNESIUM: CPT | Performed by: INTERNAL MEDICINE

## 2025-05-30 PROCEDURE — 85014 HEMATOCRIT: CPT | Performed by: PHYSICIAN ASSISTANT

## 2025-05-30 RX ORDER — HEPARIN SODIUM 5000 [USP'U]/.5ML
5000 INJECTION, SOLUTION INTRAVENOUS; SUBCUTANEOUS EVERY 8 HOURS
Status: DISCONTINUED | OUTPATIENT
Start: 2025-05-30 | End: 2025-06-04

## 2025-05-30 RX ORDER — OXYCODONE HYDROCHLORIDE 10 MG/1
10 TABLET ORAL EVERY 4 HOURS PRN
Refills: 0 | Status: DISCONTINUED | OUTPATIENT
Start: 2025-05-30 | End: 2025-05-30

## 2025-05-30 RX ORDER — OXYCODONE HYDROCHLORIDE 5 MG/1
5 TABLET ORAL EVERY 4 HOURS PRN
Refills: 0 | Status: DISCONTINUED | OUTPATIENT
Start: 2025-05-30 | End: 2025-05-30

## 2025-05-30 RX ORDER — THERA TABS 400 MCG
1 TAB ORAL DAILY
Status: DISCONTINUED | OUTPATIENT
Start: 2025-05-30 | End: 2025-06-12 | Stop reason: HOSPADM

## 2025-05-30 RX ORDER — ROPIVACAINE IN 0.9% SOD CHL/PF 0.1 %
.01-.2 PLASTIC BAG, INJECTION (ML) EPIDURAL CONTINUOUS
Status: DISCONTINUED | OUTPATIENT
Start: 2025-05-30 | End: 2025-06-01

## 2025-05-30 RX ORDER — OXYCODONE HYDROCHLORIDE 5 MG/1
5 TABLET ORAL
Refills: 0 | Status: DISCONTINUED | OUTPATIENT
Start: 2025-05-30 | End: 2025-06-12 | Stop reason: HOSPADM

## 2025-05-30 RX ORDER — DIPHENHYDRAMINE HCL 12.5MG/5ML
25 LIQUID (ML) ORAL EVERY 6 HOURS PRN
Status: DISCONTINUED | OUTPATIENT
Start: 2025-05-30 | End: 2025-06-12 | Stop reason: HOSPADM

## 2025-05-30 RX ORDER — HYDROMORPHONE HCL IN WATER/PF 6 MG/30 ML
0.2 PATIENT CONTROLLED ANALGESIA SYRINGE INTRAVENOUS
Status: DISCONTINUED | OUTPATIENT
Start: 2025-05-30 | End: 2025-05-30

## 2025-05-30 RX ORDER — METHOCARBAMOL 500 MG/1
500 TABLET, FILM COATED ORAL 4 TIMES DAILY
Status: DISCONTINUED | OUTPATIENT
Start: 2025-05-30 | End: 2025-06-02

## 2025-05-30 RX ORDER — OXYCODONE HYDROCHLORIDE 5 MG/1
5-10 TABLET ORAL
Refills: 0 | Status: DISCONTINUED | OUTPATIENT
Start: 2025-05-30 | End: 2025-05-30

## 2025-05-30 RX ORDER — HYDROMORPHONE HCL IN WATER/PF 6 MG/30 ML
0.1 PATIENT CONTROLLED ANALGESIA SYRINGE INTRAVENOUS
Status: DISCONTINUED | OUTPATIENT
Start: 2025-05-30 | End: 2025-05-31

## 2025-05-30 RX ADMIN — DICLOFENAC SODIUM 4 G: 10 GEL TOPICAL at 08:54

## 2025-05-30 RX ADMIN — METHOCARBAMOL 500 MG: 500 TABLET ORAL at 14:39

## 2025-05-30 RX ADMIN — DICLOFENAC SODIUM 4 G: 10 GEL TOPICAL at 12:34

## 2025-05-30 RX ADMIN — DICLOFENAC SODIUM 4 G: 10 GEL TOPICAL at 16:21

## 2025-05-30 RX ADMIN — NYSTATIN 1000000 UNITS: 100000 SUSPENSION ORAL at 16:21

## 2025-05-30 RX ADMIN — ESTRADIOL 2 G: 0.1 CREAM VAGINAL at 21:32

## 2025-05-30 RX ADMIN — NYSTATIN 1000000 UNITS: 100000 SUSPENSION ORAL at 08:07

## 2025-05-30 RX ADMIN — VANCOMYCIN HYDROCHLORIDE 750 MG: 1 INJECTION, POWDER, LYOPHILIZED, FOR SOLUTION INTRAVENOUS at 08:10

## 2025-05-30 RX ADMIN — OXYCODONE HYDROCHLORIDE 5 MG: 5 TABLET ORAL at 08:08

## 2025-05-30 RX ADMIN — SERTRALINE HYDROCHLORIDE 50 MG: 50 TABLET ORAL at 08:07

## 2025-05-30 RX ADMIN — THERA TABS 1 TABLET: TAB at 16:45

## 2025-05-30 RX ADMIN — DIPHENHYDRAMINE HYDROCHLORIDE 25 MG: 25 SOLUTION ORAL at 10:41

## 2025-05-30 RX ADMIN — HYDROCORTISONE SODIUM SUCCINATE 100 MG: 100 INJECTION, POWDER, FOR SOLUTION INTRAMUSCULAR; INTRAVENOUS at 03:58

## 2025-05-30 RX ADMIN — CALCITRIOL CAPSULES 0.25 MCG 0.25 MCG: 0.25 CAPSULE ORAL at 08:25

## 2025-05-30 RX ADMIN — GABAPENTIN 300 MG: 250 SOLUTION ORAL at 21:33

## 2025-05-30 RX ADMIN — ACETAMINOPHEN 975 MG: 325 TABLET, FILM COATED ORAL at 08:07

## 2025-05-30 RX ADMIN — SODIUM CHLORIDE, SODIUM LACTATE, POTASSIUM CHLORIDE, AND CALCIUM CHLORIDE 500 ML: .6; .31; .03; .02 INJECTION, SOLUTION INTRAVENOUS at 16:21

## 2025-05-30 RX ADMIN — Medication 12.5 MG: at 12:34

## 2025-05-30 RX ADMIN — FENTANYL CITRATE 25 MCG: 50 INJECTION, SOLUTION INTRAMUSCULAR; INTRAVENOUS at 07:14

## 2025-05-30 RX ADMIN — ACETAMINOPHEN 975 MG: 325 TABLET, FILM COATED ORAL at 20:14

## 2025-05-30 RX ADMIN — FENTANYL CITRATE 25 MCG: 50 INJECTION, SOLUTION INTRAMUSCULAR; INTRAVENOUS at 03:58

## 2025-05-30 RX ADMIN — HEPARIN SODIUM 5000 UNITS: 5000 INJECTION, SOLUTION INTRAVENOUS; SUBCUTANEOUS at 20:21

## 2025-05-30 RX ADMIN — LEVOTHYROXINE SODIUM 175 MCG: 0.17 TABLET ORAL at 08:07

## 2025-05-30 RX ADMIN — Medication 1000 MCG: at 21:12

## 2025-05-30 RX ADMIN — URSODIOL 250 MG: 250 TABLET ORAL at 09:56

## 2025-05-30 RX ADMIN — NOREPINEPHRINE BITARTRATE 0.02 MCG/KG/MIN: 0.02 INJECTION, SOLUTION INTRAVENOUS at 17:25

## 2025-05-30 RX ADMIN — DICLOFENAC SODIUM 4 G: 10 GEL TOPICAL at 20:17

## 2025-05-30 RX ADMIN — OXYCODONE HYDROCHLORIDE 10 MG: 10 TABLET ORAL at 12:34

## 2025-05-30 RX ADMIN — HYDROCORTISONE SODIUM SUCCINATE 100 MG: 100 INJECTION, POWDER, FOR SOLUTION INTRAMUSCULAR; INTRAVENOUS at 10:41

## 2025-05-30 RX ADMIN — HYDROCORTISONE SODIUM SUCCINATE 100 MG: 100 INJECTION, POWDER, FOR SOLUTION INTRAMUSCULAR; INTRAVENOUS at 16:45

## 2025-05-30 RX ADMIN — METHOCARBAMOL 500 MG: 500 TABLET ORAL at 09:56

## 2025-05-30 RX ADMIN — URSODIOL 250 MG: 250 TABLET ORAL at 20:14

## 2025-05-30 RX ADMIN — METHOCARBAMOL 500 MG: 500 TABLET ORAL at 20:14

## 2025-05-30 RX ADMIN — INSULIN ASPART 1 UNITS: 100 INJECTION, SOLUTION INTRAVENOUS; SUBCUTANEOUS at 16:20

## 2025-05-30 RX ADMIN — LIDOCAINE 2 PATCH: 4 PATCH TOPICAL at 08:08

## 2025-05-30 RX ADMIN — NYSTATIN 1000000 UNITS: 100000 SUSPENSION ORAL at 12:48

## 2025-05-30 RX ADMIN — HYDROCORTISONE SODIUM SUCCINATE 100 MG: 100 INJECTION, POWDER, FOR SOLUTION INTRAMUSCULAR; INTRAVENOUS at 21:32

## 2025-05-30 RX ADMIN — EZETIMIBE 10 MG: 10 TABLET ORAL at 21:12

## 2025-05-30 RX ADMIN — NYSTATIN 1000000 UNITS: 100000 SUSPENSION ORAL at 20:14

## 2025-05-30 RX ADMIN — HEPARIN SODIUM 5000 UNITS: 5000 INJECTION, SOLUTION INTRAVENOUS; SUBCUTANEOUS at 12:34

## 2025-05-30 RX ADMIN — INSULIN ASPART 1 UNITS: 100 INJECTION, SOLUTION INTRAVENOUS; SUBCUTANEOUS at 12:34

## 2025-05-30 RX ADMIN — ERTAPENEM SODIUM 500 MG: 1 INJECTION, POWDER, LYOPHILIZED, FOR SOLUTION INTRAMUSCULAR; INTRAVENOUS at 18:43

## 2025-05-30 ASSESSMENT — ACTIVITIES OF DAILY LIVING (ADL)
ADLS_ACUITY_SCORE: 63
ADLS_ACUITY_SCORE: 75
ADLS_ACUITY_SCORE: 75
ADLS_ACUITY_SCORE: 63
ADLS_ACUITY_SCORE: 65
ADLS_ACUITY_SCORE: 65
ADLS_ACUITY_SCORE: 63
ADLS_ACUITY_SCORE: 75
ADLS_ACUITY_SCORE: 59
ADLS_ACUITY_SCORE: 75
ADLS_ACUITY_SCORE: 65
ADLS_ACUITY_SCORE: 65
ADLS_ACUITY_SCORE: 59
ADLS_ACUITY_SCORE: 63
ADLS_ACUITY_SCORE: 65
ADLS_ACUITY_SCORE: 65
ADLS_ACUITY_SCORE: 63
DEPENDENT_IADLS:: CLEANING;COOKING;LAUNDRY
ADLS_ACUITY_SCORE: 63
ADLS_ACUITY_SCORE: 75

## 2025-05-30 NOTE — PROGRESS NOTES
SURGICAL Progress NOTE  05/30/2025      Date of Service (when I saw the patient): 05/30/2025    ASSESSMENT:  Luz Thompson is a 75 year old female who was admitted on 5/28/2025 with past medical history of atrial fibrillation, DVT on eliquis, CVA, CKD III, HFpEF (TTE 3/9/25 EF 60-65%), T2DM,  biliary cirrhosis s/p liver transplant in 2002, EBV, HTN, HLD, Sjogren's syndrome, Basal Cell Carincoma s/p MOHS on 4/8/25, thyroid cancer s/p thyroidectomy in 2010, Sjogren's syndrome, and diverticulitis with recent admission from 4/14-4/23 for ESBL UTI & E. Faecalis bacteremia related to sigmoid microperforation (treated with IV ertapenem) who now presents for 1-day history of lightheadedness, hypotension, worsening non-bloody diarrhea, and abdominal pain that started on 5/28.     Seen in colorectal surgery clinic (5/27) where it was decided that she should undergo open sigmoidectomy with diverting loop ileostomy. She presented in the ED for abdominal pain, hypotension per EMS and CRS recommending surgical intervention.    5/29/2025:   CRS: Dr. Campbell: Exploratory laparotomy, sigmoidectomy with end colostomy.  Cystoscopy with bilateral stent placement with urology team.      Home medications  Tylenol 500 twice daily  Amlodipine 5 mg daily  Apixaban 2.5 mg twice daily  Calcitriol 0.25 mcg daily  D-mannose p.o. twice daily  Evolocumab 140 mg subcu every 14 days  Ezetimibe 10 mg daily  Ferrous sulfate 325 daily  Folic acid 1 mg daily  Gabapentin 20 mg nightly  Hydralazine 50 mg 3 times daily    Events last 2H  On/off pressors overnight  Afib on tele, intermittent brief episodes of zhou 30s, asymptomatic  Many allergies, PRN fentanyl for pain, increased oxy  Lost PIV, only has midline      PLAN:  - wean off levophed -> possibly transfer to floor with telemetry  - PRN IV dilaudid 0.2 Q2H  - increase oxycodone 5-10mg Q4H    Neurological:  # Acute post op pain  # high risk delirium   - delirium precaution   - Monitor  neurological status. Delirium preventions and precautions.   - Pain:  Robaxin QID, tylenol 975mg BID, gabapentin 300mg QHS,Lidocaine patches Daily, Diclofenac QID  -  PRN: PO oxycodone 5-10mg, IV dilaudid 0.2 Q4H  - discontinue fentanyl 25mcg   - Sedation plan: NONE  - PTA sertraline  - melatonin Q HS  - PRN narcan    Pulmonary:   # Post operative ventilatory support   # Acute hypoxic respiratory support   - Supplemental oxygen to keep saturation above 92 %.  - Incentive spirometer every 15- 30 minutes.   - PRN albuterol   - Room air      Cardiovascular:    # Paroxysmal A-fib on apixaban   # Hypertension  # HFpEF (TTE 3/9/25 EF 60-65%)   # history of CVA  # Vasoplagia  - Monitor hemodynamic status.  - Levophed gtt, wean as able  - MAP goal >65  - PTA ezetimibe   - HOLD PTA apixaban 2.5mg BID  - Start SQH  - Start metoprolol tartrate 12.5 mg BID    Gastroenterology/Nutrition:  # primary biliary cirrhosis  # Status post liver transplant 2002  # Diverticulitis  # Status post exploratory laparotomy with Sigmoidectomy with end colostomy  # Protein calorie deficit malnutrition    - RD consult. Appreciate cares and recommendations.   - advanced diet to clear liquids  - PTA Nystatin QID  - PTA Ursodiol   - Supplement: Nutrisource   - PRN ruslan Larry    Hepatology Recs 5/30:  - Ok to hold prednisone 9 mg while patient is on IV hydrocortisone, would resume if she comes off  - Management of antibiotics per ID   - Await tacrolimus level, continue to hold tacrolimus  - Discontinue sirolimus   - Continue ursodiol   - Hepatology will continue to follow    CRS recs: 5/30  - Recommend starting clear liquid diet  - Ok for DVT ppx from CRS perspective, continue to hold eliquis for now  - Continue ertapenem per ID recs   - Multimodal pain regimen  - Hx of liver transplant - immunosuppression per transplant immunology team   - PTA levothyroxine   - Continue WOC ostomy cares   - Ron removed POD0, continue PATY drain   - Encourage out of  bed and ambulation, incentive spirometer use      Fluids/Electrolytes:   - discontinue LR 100ml/hr  - electrolyte replacement protocol    - daily weights    Renal/:  # Chronic kidney disease III  # Acute kidney injury   # Bilateral stent placement per urology 5/29  - Baseline creatinine 1.3-1.6  - Cr 2.21  - external catheter   - PTA estradiol vaginal  cream    Endocrine:  # Type 2 diabetes  # Stress hyperglycemia    - Sliding scale for glucose management  - Goal to keep BG< 180 for optimal wound healing   - Glucose AC/HS  - hypoglycemic protocol  - hydrocortisone 100mg Q6H    # Thyroid cancer status post thyroidectomy 2010  - PTA calcitriol 0.25mcg  - PTA levothyroxine 175mcg      ID:  # Reactive Leukocytosis   # EBV  # Sjogren's syndrome  # ESBL UTI with E faecalis bacteremia April 14 to 23 2025  Transplant ID consulted 5/28  - pending recs  # immunosuppression   - appreciate hepatology  - HOLD tacrolimus  - hydrocortisone 100mg Q6H  # immunoprophylaxis  # perioperative antibiotics:   - Ertapeneum   - vancomycin: 750 mg once 5/30  - WBC 20.2, stable  - daily CBC  - HOLD PTA prednisone 9mg daily      Positive cultures:  - 2/2 Gram Negative Bacilli from 5/28/25  - Klebsiella pneumoniae detected from PCR from 5/28/25    Heme:     # Acute blood loss anemia    # Anemia of critical illness  # coagulopathy due to cirrhosis and surgical blood loss    # thrombocytopenia   # anemia of critical illness   - Platelet 215 (207)  - Hemoglobin 8.5 (8.6)  - Transfuse if hgb <7.0 or signs/symptoms of hypoperfusion. Monitor and trend.   - HOLD PTA apixaban 2.5mg BID  - PTA Ferous sulfate, folic acid  - start SQH    Musculoskeletal:   # Weakness and deconditioning of critical illness   - Physical and occupational therapy consult   - up with assist, fall precaution       Skin:  # Basal cell carcinoma status post Mohs April 2025  # pruritus   - PRN bendryl PO      General Cares/Prophylaxis:    DVT Prophylaxis: SCDs. SQH  GI  "Prophylaxis: Not indicated  Restraints: Restraints for medical healing needed: NO    Lines/ tubes/ drains:  - Midline  - PATY drain right abdomen      Disposition:  -  Surgical ICU. Off levophed likely transfer to floor later tonight vs tomorrow.     Patient seen, findings and plan discussed with surgical ICU staff, Dr. Oneal.    Rocky Bah, MS4  [employer]    Resident Attestation   I, Josefina Perea DO, was present with the medical student who participated in the service and in the documentation of the note. I have verified the history and personally performed the physical exam and medical decision making. I agree with the assessment and plan of care as documented in the note and have edited where appropriate.      Josefina Perea DO  General Surgery PGY-1  Date of Service: 05/30/2025    - - - - - - - - - - - - - - - - - - - - - - - - - - - - - - - - - - - - - - - - - - - - - -   Interval History:  [place] states that she's feeling better compared to yesterday. Although she is having lower abdominal pain with some coughing that started today. Overnight lost all PIVs. MAP <65 during sleep and levophed was restarted. Multiple episodes of bradycardia down to 30s but asymptomatic and otherwise hemodynamically stable.     REVIEW OF SYSTEMS: 10 point ROS neg other than the symptoms noted above in the HPI.    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     Deep venous thrombosis     Diverticulosis of sigmoid colon 12/21/2013    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, history of     Received Rituxan during Summer of 2016    " Glaucoma     H/O esophageal varices     Hearing loss     Hyperlipidemia 04/10/2012    Says that she does not have it anymore, not on meds    Hypertension     Hypertriglyceridemia     Liver replaced by transplant (H) 10/17/2011    Dr. 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       SURGICAL HISTORY:   Past Surgical History:   Procedure Laterality Date    APPENDECTOMY  1961    CATARACT IOL, RT/LT      RE12/19/2013, LE12/10/2013 - Toric lenses    CHOLECYSTECTOMY  1991    COLECTOMY WITH COLOSTOMY, COMBINED N/A 5/29/2025    Procedure: Open sigmoidectomy with end colostmy;  Surgeon: Al Campbell MD;  Location: [facility] OR    COLONOSCOPY  03/10/2014    Procedure: COLONOSCOPY;;  Surgeon: Gentry Ramirez MD;  Location:  GI    CYSTOSCOPY      ear drum repair      ENDOBRONCHIAL ULTRASOUND FLEXIBLE N/A 09/29/2017    Procedure: ENDOBRONCHIAL ULTRASOUND FLEXIBLE;  Flexible Bronchoscopy, Endobronchial Ultrasound, Transbronchial Needle Aspiration ;  Surgeon: Eden Clinton MD;  Location: [facility] OR    ENDOSCOPIC RETROGRADE CHOLANGIOPANCREATOGRAM  09/19/2013    Procedure: ENDOSCOPIC RETROGRADE CHOLANGIOPANCREATOGRAM;  Endoscopic Retrograde Cholangiopancreatogram with single balloon enteroscopy, ballon sweep of bile duct;  Surgeon: Brett Membreno MD;  Location: [facility] OR     KNEE SCOPE,MED/LAT MENISECTOMY Right 08/10/2012    partial medial menisectomy only    INSERT STENT URETER Bilateral 5/29/2025    Procedure: bilateral Insert and  "removal stent ureter, cystoscopy;  Surgeon: David Leung MD;  Location: [facility] OR    KNEE SURGERY  1966    R knee    PICC INSERTION  2013    4fr SL PASV PICC, 40cm (1cm external) in the R basilic vein w/ tip in the low SVC    PICC INSERTION  2014    5 fr DL BioFlo Navilyst PICC, 46 cm (3 cm external) in the L basilic vein w/ tip in the SVC RA junction.    THYROIDECTOMY  2010    TRANSPLANT LIVER RECIPIENT LIVING UNRELATED  2002       SOCIAL HISTORY:   Social History     Socioeconomic History    Marital status:      Spouse name: Robbin Thompson    Number of children: 4    Years of education: 20   Occupational History    Occupation: Guardian Conservator      Employer: [employer]     Comment: social work     Employer: [employer]   Tobacco Use    Smoking status: Former     Current packs/day: 0.00     Average packs/day: 1 pack/day for 18.0 years (18.0 ttl pk-yrs)     Types: Cigarettes     Start date: 1967     Quit date: 1985     Years since quittin.1    Smokeless tobacco: Never   Vaping Use    Vaping status: Never Used   Substance and Sexual Activity    Alcohol use: Yes     Alcohol/week: 0.0 standard drinks of alcohol     Comment: rare - \"I toast at weddings\"    Drug use: No    Sexual activity: Yes     Partners: Male     Birth control/protection: Post-menopausal   Other Topics Concern    Exercise Yes     Comment: cardio and strengthing   Social History Narrative    She is retired. [place]. 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     Social Drivers of Health     Financial Resource Strain: Low Risk  (4/15/2025)    Financial Resource Strain     Within the past 12 months, have you or your family members you live with been unable to get utilities (heat, electricity) when it was really needed?: No   Food Insecurity: Low Risk  (4/15/2025)    Food Insecurity    "  Within the past 12 months, did you worry that your food would run out before you got money to buy more?: No     Within the past 12 months, did the food you bought just not last and you didn t have money to get more?: No   Transportation Needs: Low Risk  (4/15/2025)    Transportation Needs     Within the past 12 months, has lack of transportation kept you from medical appointments, getting your medicines, non-medical meetings or appointments, work, or from getting things that you need?: No   Recent Concern: Transportation Needs - High Risk (1/27/2025)    Transportation Needs     Within the past 12 months, has lack of transportation kept you from medical appointments, getting your medicines, non-medical meetings or appointments, work, or from getting things that you need?: Yes   Physical Activity: Insufficiently Active (11/9/2023)    Received from [facility]    Exercise Vital Sign     Days of Exercise per Week: 5 days     Minutes of Exercise per Session: 10 min   Stress: No Stress Concern Present (11/9/2023)    Received from [facility]    [employer] [place] [employer] - Occupational Stress Questionnaire     Feeling of Stress : Only a little   Social Connections: Feeling Socially Integrated (12/20/2023)    Received from [facility]    OASIS : Social Isolation     Frequency of experiencing loneliness or isolation: Never   Interpersonal Safety: Low Risk  (4/15/2025)    Interpersonal Safety     Do you feel physically and emotionally safe where you currently live?: Yes     Within the past 12 months, have you been hit, slapped, kicked or otherwise physically hurt by someone?: No     Within the past 12 months, have you been humiliated or emotionally abused in other ways by your partner or ex-partner?: No   Housing Stability: Low Risk  (4/15/2025)    Housing Stability     Do you have housing? : Yes     Are you worried about losing your housing?: No   Recent Concern: Housing Stability - High Risk (1/22/2025)     Housing Stability     Do you have housing? : No     Are you worried about losing your housing?: No     FAMILY HISTORY: No bleeding/clotting disorders nor problems with anesthesia.    ALLERGIES:   Allergies   Allergen Reactions    Fluconazole Hives and Itching     Full body hives  **Intradermal skin testing negative**  [See intradermal skin testing results from 8/2/2019]    Mycophenolate Diarrhea and Nausea and Vomiting     Patient stated it was chronic and lasted months      Penicillins Anaphylaxis, Hives, Itching and Rash     **Intradermal skin testing negative**  [See intradermal skin testing results from 8/2/2019]      Simvastatin Muscle Pain (Myalgia)     severe  Other reaction(s): Myalgia caused by statin    Methotrexate Other (See Comments)     Other reaction(s): Sore  Sores in mouth, esophagus, and stomach.       Morphine And Codeine Itching and Other (See Comments)     Psych disturbance  Other reaction(s): Confusion, Mood alteration    Quinolones Anxiety, Dizziness, Headache, Other (See Comments), Palpitations and Unknown     Other reaction(s): Hyperactive behavior, Lightheadedness, Mood alteration    Dizzy, light headed    Dizziness, shaky, and jumpy    Capsules, Empty Gelatin [Gelatin]     Carvedilol Diarrhea     Per pt - severe diarrhea and LE swelling  + tolerating Toprol    Lansoprazole Diarrhea    Strawberry Extract     Azithromycin Itching     [See intradermal skin testing results from 8/2/2019]    Bactrim [Sulfamethoxazole-Trimethoprim] Other (See Comments)     Numb mouth, tingling lips (treated with anti-histamines)    Cephalosporins Itching     [See intradermal skin testing results from 8/2/2019]    Ciprofloxacin Hcl Other (See Comments) and Dizziness     Insomnia, mood lability, Irregular heart beat         Doxycycline Itching and Unknown     [See intradermal skin testing results from 8/2/2019]    Lisinopril Cough    Omeprazole Itching    Tolectin [Nsaids] Rash    Tolmetin Rash and Itching     Tramadol Rash, Hives and Itching       MEDICATIONS:  Current Facility-Administered Medications   Medication Dose Route Frequency Provider Last Rate Last Admin    acetaminophen (TYLENOL) tablet 975 mg  975 mg Oral BID Jah Bettencourt PA-C   975 mg at 05/30/25 0807    albuterol (PROVENTIL) neb solution 2.5 mg  2.5 mg Nebulization Q4H PRN Jah Bettencourt PA-C        [Held by provider] amLODIPine (NORVASC) tablet 5 mg  5 mg Oral Daily Jah Bettencourt PA-C        [Held by provider] apixaban ANTICOAGULANT (ELIQUIS) tablet 2.5 mg  2.5 mg Oral BID Jah Bettencourt PA-C        calcitRIOL (ROCALTROL) capsule 0.25 mcg  0.25 mcg Oral Daily Jah Bettencourt PA-C   0.25 mcg at 05/30/25 0825    calcium carbonate (TUMS) chewable tablet 1,000 mg  1,000 mg Oral Q4H PRN Jah Bettencourt PA-C        glucose gel 15-30 g  15-30 g Oral Q15 Min PRN Jah Bettencourt PA-C        Or    dextrose 50 % injection 25-50 mL  25-50 mL Intravenous Q15 Min PRN Jah Bettencourt PA-C        Or    glucagon injection 1 mg  1 mg Subcutaneous Q15 Min PRN Jah Bettencourt PA-C        diclofenac (VOLTAREN) 1 % topical gel 4 g  4 g Topical 4x Daily Jah Bettencourt PA-C   4 g at 05/30/25 0854    diphenhydrAMINE (BENADRYL) elixir 25 mg  25 mg Oral or Feeding Tube Q6H PRN Aleja Antunez MD   25 mg at 05/30/25 1041    ertapenem (INVanz) 500 mg in sodium chloride 0.9 % 50 mL intermittent infusion  500 mg Intravenous Q24H Smason Estes  mL/hr at 05/29/25 1938 500 mg at 05/29/25 1938    estradiol (ESTRACE) cream 2 g  2 g Vaginal Once per day on Monday Wednesday Friday Jah Bettencourt PA-C        ezetimibe (ZETIA) tablet 10 mg  10 mg Oral At Bedtime Jah Bettencourt PA-C   10 mg at 05/29/25 2149    ferrous sulfate (FEROSUL) tablet 325 mg  325 mg Oral At Bedtime Jah Bettencourt PA-C        folic acid (FOLVITE) tablet 1,000 mcg  1,000 mcg Oral At Bedtime Jah Bettencourt PA-C   1,000 mcg at 05/29/25 2149    gabapentin (NEURONTIN) solution 300  mg  300 mg Oral At Bedtime Jah Bettencourt PA-C        heparin ANTICOAGULANT injection 5,000 Units  5,000 Units Subcutaneous Q8H Josefina Perea DO        [Held by provider] hydrALAZINE (APRESOLINE) tablet 50 mg  50 mg Oral TID Jah Bettencourt PA-C        hydrocortisone sodium succinate PF (solu-CORTEF) injection 100 mg  100 mg Intravenous Q6H Torsten Diaz MD   100 mg at 05/30/25 1041    HYDROmorphone (DILAUDID) injection 0.2 mg  0.2 mg Intravenous Q2H PRN Josefina Perea DO        insulin aspart (NovoLOG) injection (RAPID ACTING)  1-3 Units Subcutaneous TID AC Jah Bettencourt PA-C   1 Units at 05/29/25 1648    insulin aspart (NovoLOG) injection (RAPID ACTING)  1-3 Units Subcutaneous At Bedtime Jah Bettencourt PA-C        levothyroxine (SYNTHROID/LEVOTHROID) tablet 175 mcg  175 mcg Oral QAM AC Jah Bettencourt PA-C   175 mcg at 05/30/25 0807    Lidocaine (LIDOCARE) 4 % Patch 2 patch  2 patch Transdermal Q24H Josefina Perea DO   2 patch at 05/30/25 0808    lidocaine (LMX4) cream   Topical Q1H PRN Jah Bettencourt PA-C        lidocaine 1 % 0.1-1 mL  0.1-1 mL Other Q1H PRN Jah Bettencourt PA-C        melatonin tablet 1 mg  1 mg Oral At Bedtime Josefina Perea DO        methocarbamol (ROBAXIN) tablet 500 mg  500 mg Oral 4x Daily Josefina Perea DO   500 mg at 05/30/25 0956    metoprolol tartrate (LOPRESSOR) half-tab 12.5 mg  12.5 mg Oral BID Josefina Perea DO        naloxone (NARCAN) injection 0.2 mg  0.2 mg Intravenous Q2 Min PRN Aleja Antunez MD        Or    naloxone (NARCAN) injection 0.4 mg  0.4 mg Intravenous Q2 Min PRN Aleja Antunez MD        Or    naloxone (NARCAN) injection 0.2 mg  0.2 mg Intramuscular Q2 Min PRN Aleja Antunez MD        Or    naloxone (NARCAN) injection 0.4 mg  0.4 mg Intramuscular Q2 Min PRN Aleja Antunez MD        norepinephrine (LEVOPHED) 4 mg in  mL PERIPHERAL infusion  0.01-0.2 mcg/kg/min Intravenous Continuous Swathi Correa APRN CNP   Stopped at 05/30/25  0620    Nutrisource Fiber PO packet 1 each  1 packet Oral Daily Jah Bettencourt PA-C        nystatin (MYCOSTATIN) suspension 1,000,000 Units  1,000,000 Units Oral 4x Daily Jah Bettencourt PA-C   1,000,000 Units at 05/30/25 0807    ondansetron (ZOFRAN) injection 4 mg  4 mg Intravenous Q6H PRN Jah Bettencourt PA-C        oxyCODONE (ROXICODONE) tablet 5 mg  5 mg Oral Q4H PRN Josefina Perea DO        Or    oxyCODONE IR (ROXICODONE) tablet 10 mg  10 mg Oral Q4H PRN Josefina Perea DO        [Held by provider] predniSONE (DELTASONE) half-tab 9 mg  9 mg Oral Daily Luzma Osman APRN CNP        sertraline (ZOLOFT) tablet 50 mg  50 mg Oral Daily Jah Bettencourt PA-C   50 mg at 05/30/25 0807    sodium chloride (PF) 0.9% PF flush 3 mL  3 mL Intracatheter Q8H LUZMARIA Jah Bettencourt PA-C   3 mL at 05/30/25 0406    sodium chloride (PF) 0.9% PF flush 3 mL  3 mL Intracatheter q1 min prn Jah Bettencourt PA-C        [Held by provider] tacrolimus (GENERIC EQUIVALENT) capsule 0.5 mg  0.5 mg Oral BID IS Luzma Osman APRN CNP        ursodiol (ACTIGALL) tablet 250 mg  250 mg Oral BID Jah Bettencourt PA-C   250 mg at 05/30/25 0956    vancomycin place horvath - receiving intermittent dosing  1 each Intravenous See Admin Instructions Jah Bettencourt PA-C           PHYSICAL EXAMINATION:  Temp:  [97  F (36.1  C)-98.4  F (36.9  C)] 97.6  F (36.4  C)  Pulse:  [] 96  Resp:  [10-21] 21  BP: ()/(43-78) 121/43  MAP:  [49 mmHg-103 mmHg] 87 mmHg  Arterial Line BP: ()/(23-68) 128/51  SpO2:  [74 %-100 %] 98 %  General: appropriately  HEENT: WNL  Neck:WNL   Neuro: A&Ox3, NAD  Pulm/Resp: Clear breath sounds bilaterally   CV: RRR  Abdomen: Soft, mildly distended, tender diffuse. PATY drain  Ostomy: moist/pink, viable. No output.  :  external catheter in place, urine yellow and clear  Incisions/Skin: CDI  MSK/Extremities: no peripheral edema, moving all extremities, peripheral pulses intact, extremities well  perfused.    LABS: Reviewed.   Arterial Blood Gases   Recent Labs   Lab 05/29/25  1051   PH 7.32*   PCO2 35   PO2 136*   HCO3 18*     Complete Blood Count   Recent Labs   Lab 05/30/25  0412 05/29/25  1515 05/29/25  1051 05/29/25  0546 05/28/25  1343   WBC 20.2* 20.2*  --  9.1 9.3   HGB 8.6* 8.5* 8.5* 7.6* 9.9*    215  --  200 291     Basic Metabolic Panel  Recent Labs   Lab 05/30/25  0836 05/30/25  0412 05/30/25  0405 05/29/25  2356 05/29/25  1643 05/29/25  1515 05/29/25  1317 05/29/25  1051 05/28/25  2302 05/28/25  2153 05/28/25  1825 05/28/25  1343   NA  --  141  --   --   --  140  --  139  --  138  --  136   POTASSIUM  --  4.3  --   --   --  4.7  --  4.5  --  4.3  --  4.0   CHLORIDE  --  111*  --   --   --  110*  --   --   --  101  --  97*   CO2  --  17*  --   --   --  18*  --   --   --  21*  --  21*   BUN  --  78.8*  --   --   --  85.2*  --   --   --  107.1*  --  117.4*   CR  --  2.21*  --   --   --  2.37*  --   --   --  2.67*  --  2.99*   * 161* 159* 128*   < > 174*   < > 191*   < > 244*   < > 112*    < > = values in this interval not displayed.     Liver Function Tests  Recent Labs   Lab 05/29/25  1515 05/29/25  0546 05/28/25  1343 05/27/25  1136   AST 68*  --  23 26   ALT 53*  --  27 28   ALKPHOS 196*  --  160* 178*   BILITOTAL 0.2  --  0.4 0.4   ALBUMIN 2.4*  --  3.2* 3.6   INR  --  1.49*  --   --      Pancreatic Enzymes  No lab results found in last 7 days.  Coagulation Profile  Recent Labs   Lab 05/29/25  0546   INR 1.49*   PTT 35       IMAGING:  No results found for this or any previous visit (from the past 24 hours).

## 2025-05-30 NOTE — PROGRESS NOTES
Brief Hepatology Progress Note:    S: Evaluated patient at bedside - she is feeling ok today, reports abdominal pain. She underwent open sigmoidectomy and end colostomy 5/30/25. She also had ureteral stents placed.     O: She is alert and conversant. Has weaned off norepi overnight. She is now on 100 mg hydrocortisone q6H, and prednisone was held in this setting.     WBC 20, Hgb 8.6   Cr 2.21, downtrending; baseline 1.3-1.5  Bcx 5/28 + for klebsiella x2   Ucx mixed urogenitial louann     A/P: 75F with pbc status post liver transplant 2002, recurrent UTI who is s/p sigmoidectomy and end colostomy for perforated diverticulitis and is admitted with septic shock and klebsiella bacteremia.     Recommendations:  - Ok to hold prednisone 9 mg while patient is on IV hydrocortisone, would resume if she comes off  - Management of antibiotics per ID   - Await tacrolimus level, continue to hold tacrolimus  - Discontinue sirolimus   - Continue ursodiol   - Hepatology will continue to follow     Discussed with Dr. Montano.     Mahendra Castillo MD  Gastroenterology Fellow

## 2025-05-30 NOTE — PROGRESS NOTES
"   05/30/25 1536   Appointment Info   Signing Clinician's Name / Credentials (OT) Zelda Constantino, OTR/L   Rehab Comments (OT) abd precautions, monitor MAP   Living Environment   People in Home alone   Current Living Arrangements independent living facility   Home Accessibility no concerns   Transportation Anticipated family or friend will provide   Living Environment Comments Pt from [facility]", has been in TCU for the last month. Has walk-in shower, shower chair, and grab bars.   Self-Care   Usual Activity Tolerance moderate   Current Activity Tolerance poor   Equipment Currently Used at Home walker, rolling;grab bar, tub/shower;grab bar, toilet;shower chair   Fall history within last six months yes   Number of times patient has fallen within last six months 2   Activity/Exercise/Self-Care Comment Pt reports IND in most ADLs, has assist with transferring in/out of shower d/t L knee buckling. Has had 2 falls d/t weakness, slip from chair and slow descent to ground reporting inability to stand back up after crouching down to grab something from the floor.   Instrumental Activities of Daily Living (IADL)   IADL Comments Has assist with meal prep - 2 meals/day, planning on having more assist with laundry and cleaning. IND in transportation and med mgmt   General Information   Onset of Illness/Injury or Date of Surgery 05/29/25   Referring Physician Jah Bettencourt, PAFinnC   Patient/Family Therapy Goal Statement (OT) Return home   Additional Occupational Profile Info/Pertinent History of Current Problem Per EMR, \"75 year old female with history of liver transplant admitted for smoldering diverticulitis with 4.1cm abscess s/p open sigmoidectomy and end colostomy on 5/29/25, recovering appropriately.\"   Existing Precautions/Restrictions fall;abdominal   Left Upper Extremity (Weight-bearing Status) partial weight-bearing (PWB)  (<10 lbs lifting)   Right Upper Extremity (Weight-bearing Status) partial weight-bearing (PWB)  (<10 lbs " lifting)   General Observations and Info Activity: up with assist   Cognitive Status Examination   Orientation Status orientation to person, place and time   Cognitive Status Comments lethargic, following commands, [place] despite having hearing aids, particular   Visual Perception   Visual Impairment/Limitations WFL   Sensory   Sensory Comments baseline neuropathy cristiane feet   Pain Assessment   Patient Currently in Pain No   Posture   Posture forward head position;protracted shoulders   Range of Motion Comprehensive   Comment, General Range of Motion L sh limited by previous rotator cuff tear, reports R is probably similar. Limited however still WFL   Strength Comprehensive (MMT)   Comment, General Manual Muscle Testing (MMT) Assessment general weakness and deconditioning   Coordination   Coordination Comments not assessed   Bed Mobility   Bed Mobility supine-sit;sit-supine   Supine-Sit [place] (Bed Mobility) moderate assist (50% patient effort)   Sit-Supine [place] (Bed Mobility) maximum assist (25% patient effort)   Comment (Bed Mobility) cues to not pull with UEs d/t precautions, maxA sit>supine for ease and efficiency to improve MAP   Transfers   Transfer Comments unable to transfer d/t drop in MAP   Balance   Balance Assessment sitting static balance   Sitting Balance: Static minimal assist   Activities of Daily Living   BADL Assessment/Intervention bathing;lower body dressing;grooming;toileting   Bathing Assessment/Intervention   [place] Level (Bathing) moderate assist (50% patient effort)   Comment, (Bathing) per clinical judgement   Lower Body Dressing Assessment/Training   [place] Level (Lower Body Dressing) maximum assist (25% patient effort)   Comment, (Lower Body Dressing) doffing socks, donning slippers   Grooming Assessment/Training   [place] Level (Grooming) supervision;set up   Comment, (Grooming) per clinical judgement   Toileting   [place] Level (Toileting) maximum assist  (25% patient effort)   Comment, (Toileting) per clinical judgement   Clinical Impression   Criteria for Skilled Therapeutic Interventions Met (OT) Yes, treatment indicated   OT Diagnosis Dec IND in I/ADLs   OT Problem List-Impairments impacting ADL problems related to;activity tolerance impaired;balance;cognition;mobility;range of motion (ROM);strength;pain;post-surgical precautions   Assessment of Occupational Performance 3-5 Performance Deficits   Identified Performance Deficits dressing, bathing, toileting, functional transfers/mobility   Planned Therapy Interventions (OT) ADL retraining;IADL retraining;balance training;bed mobility training;cognition;ROM;strengthening;transfer training;progressive activity/exercise;home program guidelines   Clinical Decision Making Complexity (OT) problem focused assessment/low complexity   Risk & Benefits of therapy have been explained evaluation/treatment results reviewed;care plan/treatment goals reviewed;risks/benefits reviewed;current/potential barriers reviewed;participants voiced agreement with care plan;participants included;patient   Clinical Impression Comments Pt below functional baseline. Would benefit from continued OT services to promote strength, safety, and act johnathan for I/ADLs   OT Total Evaluation Time   OT Eval, Low Complexity Minutes (51739) 8   OT Goals   Therapy Frequency (OT) 6 times/week   OT Predicted Duration/Target Date for Goal Attainment 06/13/25   OT Goals Upper Body Dressing;Lower Body Dressing;Lower Body Bathing;Toilet Transfer/Toileting;Cognition;Aerobic Activity;OT Goal 1;Transfers   OT: Upper Body Dressing Independent;within precautions   OT: Lower Body Dressing Modified independent;within precautions   OT: Lower Body Bathing Modified independent;with precautions   OT: Transfer Minimal assist  (shower transfer)   OT: Toilet Transfer/Toileting Modified independent;within precautions   OT: Cognitive Patient/caregiver will verbalize understanding of  cognitive assessment results/recommendations as needed for safe discharge planning   OT: Perform aerobic activity with stable cardiovascular response intermittent activity;10 minutes;ambulation   OT: Goal 1 Pt will adhere to abdominal precautions 100% of the time to promote safety and IND in I/ADLs   OT Discharge Planning   OT Plan review precautions, SPT as able, seated ADLs, monitor cog   OT Discharge Recommendation (DC Rec) Transitional Care Facility;home with assist;home with home care occupational therapy   OT Rationale for DC Rec Pt below functional baseline. Limited by BP, fatigue, and post op precautions. Pt very adamant on not wanting to return to TCU, education provided on day-by-day recs and that they can be updated but that at this time/day, TCU would be the safest option until pt can tolerate further mobilization to simulate household distances and complete ADLs. Pt understanding. Currently rec TCU to promote strength and safety for return home. Pending progress may progress back to ILF w/ greater assist in IADLs and HHOT. Will continue to monitor and update recs as appropriate.   OT Brief overview of current status Mod-maxA bed mobility   Total Session Time   Timed Code Treatment Minutes 40   Total Session Time (sum of timed and untimed services) 48

## 2025-05-30 NOTE — PROGRESS NOTES
VAT Scan pt left arm and was not able to place PIV and pt refused for us to try right arm .RN is aware.

## 2025-05-30 NOTE — PLAN OF CARE
ICU End of Shift Summary. See flowsheets for vital signs and detailed assessment.    Changes this shift: adjusted PRN and scheduled pain regimen to manage uncontrolled pain medication this am. Started levo to maintain SBP above 100 for a short period this afternoon. Stopped levo @ 1845. Pt tolerating clear liquid diet. WOC reviewed colostomy supply pouch change with daughter Gloria at the bedside. Pt needing 1L NC @ times when sleeping. No output through colostomy this shift. PATY w/ serosanguinous output.     Plan:  advance diet per MD, monitor BP      Goal Outcome Evaluation:      Plan of Care Reviewed With: patient, child    Overall Patient Progress: no changeOverall Patient Progress: no change    Outcome Evaluation: pt neeeding levo to maintain map of above 65 after bolus of LR 500ml this afternoon. episode of hypotension happened after sitting at EOB w/ physical therapy. SBP was in the 80-90s, MaP was upper 50s low 60s during the episode. pt dizzy upon laying down.

## 2025-05-30 NOTE — CONSULTS
[place] Nurse Inpatient Assessment     Consulted for: new end colostomy    Summary: 5/30 - Pt's daughter Gloria at bedside for pouch change demo, would like to be present for education    [facility] nurse follow-up plan: daily    Patient History (according to provider note(s):      75 year old female with history of liver transplant admitted for smoldering diverticulitis with 4.1cm abscess s/p open sigmoidectomy and end colostomy on 5/29/25, recovering appropriately.     Assessment:      Areas visualized during today's visit: Focused: Abdomen    Assessment of new end Colostomy:  Diagnosis Pertinent to Stoma: Diverticulitis with perforation      Surgery Date: 5/29/25  Surgeon: Al Campbell MD       Hospital: [facility]  Pouching system in place on assessment today: Marina one piece, cut to fit, and skin prep   Pouch barrier status: Leaking at 3 and 9 o clock (yellow mucous/stool)  Pouch last changed/wear time: 1 day (was changed by RN last night)  Reason for pouch change today: ostomy education, leakage, and initial post-op assessment  Effectiveness of current pouching/ supply plan: Attempting new system, will re-evaluate next assessment  Change made with ostomy management today: Yes  Pouching system placed today: Marina two piece, cut to fit, soft convex, barrier ring, and skin prep   Supplies: gathered, at bedside, and discussed with RN  Last photo: n/a  Stoma location: LLQ  Stoma size: 35 mm,   Stoma appearance: normal-appearing, red, round, moist, and slightly protuberant (in soft crease/fold at 3 and 9 o'clock)  Mucocutaneous junction:  intact  Peristomal complication(s): none   Output: yellow mucous/stool on back of pouch barrier  Output volume emptied during visit: 0ml  Abdominal assessment: Soft  Surgical site(s): dressing dry and intact  NG still in place? No  Pain: denies  Is patient still on a PCA? No    Ostomy education assessment:  Participant of teaching  session today: patient  and family member Daughter Gloria  Education completed today: Stoma assessment, Pouching system assessment , Evaluate leakage issue, Adjustment of pouching plan, Pouch change demonstration, Ostomy accessory product use , Introduction to pouches, Peristomal skin care, and Infection prevention/hygiene   Educational materials/methods: Verbal, Written, Demonstration, FOD [place] education hand out, Addressed patient/caregiver questions/concerns, and Left packet of information at bedside   Education still needed: Pouch change return demonstration, Pouch emptying demonstration, Pouch emptying return demonstration, Intake and output recording, Fluid and electrolyte balance , Importance of hydration, When to seek medical attention, Low fiber diet , Odor/flatus management , Hernia prevention, Lifestyle adjustments , and Discharge instructions  Learning Comprehension:   Psychosocial assessment: Pt says she is too sleepy and says will need to hear information many times, daughter is eager to learn, will assist after discharge if needed  Patient readiness for education today: lethargic, falling asleep   Following today's visit: family member marko Castro is able to demonstrate;         1. How to empty their pouch? No        2. How to change their pouch? Oscar provided  and Needs additional practice         3. How to read and record intake and output correctly? No  Preparation for discharge completed: No discharge preparation started yet  Preparation for discharge still needed: Placed prescription recommendations in discharge navigator for MD to sign, Ensured patient has extra supplies for discharge, Discussed how to order supplies after discharge , Ordered samples from  after gaining consent from patient/caregiver, Discussed how and when to make an outpatient WOC nurse appointment after discharge, Prepared for discharge home with home care, and Discuss signs/symptoms of when to seek medical  "attention  Pt support system on discharge: Daughter Gloria  [facility] recommend home care? By [facility] RN if possible  Face to face time: 45 minutes    Treatment Plan:     LLQ Colostomy pouching plan:   Pouching system: ostomy supplies pouches: Marina 57 FECAL ([id number]) ostomy supplies barrier: Marina 57mm SOFT CONVEX ([id number])  Accessories used: [facility] ostomy accessories: 2\" Cera Barrier Ring ([id number]) and Cavilon no sting barrier film ([id number])   Frequency of pouch changes: Twice weekly and PRN leakage  [facility] follow up plan: Daily Monday-Friday (as able)  Bedside RN interventions: Change pouch PRN if leaking using the supplies above, Empty pouch when 1/3 to 1/2 full, ensure to clean pouch outlet after emptying to prevent odor, Notify [facility] for ongoing pouch leakage, and Document stoma appearance and output volume, color, and consistency every shift     Orders: Written      DATA:     Current support surface: Standard    Containment of urine/stool: Suction based external urinary catheter  and Colostomy pouch  BMI: Body mass index is 28.28 kg/m .   Active diet order: Orders Placed This Encounter      Clear Liquid Diet     Output: I/O last 3 completed shifts:  In: 1756.64 [P.O.:270; I.V.:1436.64; IV Piggyback:50]  Out: 1680 [Urine:1250; Drains:430]     Labs:   Recent Labs   Lab 05/30/25  0412 05/29/25  1515 05/29/25  1051 05/29/25  0546   ALBUMIN  --  2.4*  --   --    HGB 8.6* 8.5*   < > 7.6*   INR  --   --   --  1.49*   WBC 20.2* 20.2*  --  9.1   A1C  --  6.3*  --   --     < > = values in this interval not displayed.     Pressure injury risk assessment:   Sensory Perception: 2-->very limited  Moisture: 3-->occasionally moist  Activity: 1-->bedfast  Mobility: 2-->very limited  Nutrition: 2-->probably inadequate  Friction and Shear: 2-->potential problem  Shashank Score: 12    Pager no longer is use, please contact through [facility] group: [facility] Nurse [place]   Dept. Office Number: [phone]    Katelyn Parikh RN CWOCN    "

## 2025-05-30 NOTE — PLAN OF CARE
Report received from previous RN and care assumed at 0330. Upon assessment pt was A&Ox4 and answering assessment questions appropriately She stated abdominal pain 2/10 that was no worse than her pain throughout the day and denied any pain interventions. NPO status for possible procedure. Continuous LR infusion was restarted as it had been stopped since 18:27 per MAR. Upon getting vitals, pt was hypotensive and soon after started requiring 2LNC to keep O2 sats up, however pt stated that her O2 sats usually drop while sleeping. Otherwise afebrile, NSR 70sBPM, and respirations normal in depth/rhythm/rate.     Dr. Torsten Diaz notified of changes in pt condition and orders for a NS bolus 1000ml were received. Pt was unresponsive to fluid bolus and after about 30min started becoming more lethargic with slurred speech and slower to respond. A rapid response was called at this time.    During RRT, pt BPs did no respond to further fluid boluses and was then started on levophed which brought MAPs into the 60s.    Care was relinquished at 0600 to NENO Dietz who assisted in care throughout the rapid response.

## 2025-05-30 NOTE — PROGRESS NOTES
"CLINICAL NUTRITION SERVICES - ASSESSMENT NOTE    RECOMMENDATIONS FOR MDs/PROVIDERS TO ORDER:  ADAT to regular    Registered Dietitian Interventions:  Supplements:  -PRN per pt request  -Offer Gelatein TID with meals - ask pt's flavor preference    Thera-Vit 1 tablet daily (without minerals given pt also currently on high dose iron supplementation)    Future/Additional Recommendations:  Obtain nutrition history and NFPE as able  Monitor diet advancement and PO adequacy  Monitor wt trends     REASON FOR ASSESSMENT  Provider order - \"frail\"    INFORMATION OBTAINED  Patient not available for interview due to pt in ICU s/p surgery and writer remote    NUTRITION HISTORY  Pt seen by Clinical Nutrition in 3/2025. At that time, reported good appetite/intake and that \"eating is never a problem for me\". Noted to be doing fiber supplements and vitamins at home.     Strawberry allergy noted.    CURRENT NUTRITION ORDERS  Diet: Clear Liquid    CURRENT INTAKE/TOLERANCE  100% of one meal noted so far in RN flowsheets    LABS  Nutrition-relevant labs: Reviewed    MEDICATIONS  Nutrition-relevant medications: Reviewed; ferrous sulfate, folic acid, norepi gtt, Nut[employer] fiber 1 pkt    ANTHROPOMETRICS  Height: 170.2 cm (5' 7\")  Admission Weight: 82.6 kg (182 lb) (05/28/25 1824)   Most Recent Weight: 81.9 kg (180 lb 8.9 oz) (05/30/25 0400)  IBW: 61.4 kg   BMI: Body mass index is 28.28 kg/m .   Weight History: Some wt fluctuations over the past ~1 year. Overall, remains above wt 6 months ago.   Wt Readings from Last 40 Encounters:   05/30/25 81.9 kg (180 lb 8.9 oz)   05/27/25 82.7 kg (182 lb 6.4 oz)   05/27/25 81.6 kg (180 lb)   05/22/25 82.7 kg (182 lb 6.4 oz)   05/19/25 83.3 kg (183 lb 9.6 oz)   05/15/25 84 kg (185 lb 3.2 oz)   05/13/25 84 kg (185 lb 3.2 oz)   05/12/25 84 kg (185 lb 3.2 oz)   05/07/25 82.1 kg (181 lb)   05/05/25 82.1 kg (181 lb)   05/02/25 82.2 kg (181 lb 3.2 oz)   04/30/25 82.1 kg (181 lb)   04/28/25 82.1 kg (181 lb) "   04/28/25 82.1 kg (181 lb)   04/24/25 82.1 kg (181 lb)   04/15/25 76.4 kg (168 lb 6.4 oz)   02/12/25 77.1 kg (170 lb)   02/06/25 86.2 kg (190 lb)   01/19/25 86.2 kg (190 lb)   01/10/25 86.5 kg (190 lb 11.2 oz)   01/04/25 83 kg (183 lb)   01/02/25 79.4 kg (175 lb)   12/10/24 79.4 kg (175 lb)   12/02/24 80.7 kg (178 lb)   10/17/24 81.2 kg (179 lb)   10/13/24 86 kg (189 lb 9.5 oz)   10/01/24 81.2 kg (179 lb)   09/25/24 79.9 kg (176 lb 2.4 oz)   09/09/24 78.5 kg (173 lb)   08/23/24 78.5 kg (173 lb)   07/15/24 79.8 kg (176 lb)   06/23/23 80.7 kg (178 lb)   06/15/23 80.7 kg (178 lb)   06/12/23 77.6 kg (171 lb)   09/19/19 83.9 kg (185 lb)   09/13/19 83.2 kg (183 lb 6.4 oz)   09/11/19 83.2 kg (183 lb 6.4 oz)   09/09/19 83.2 kg (183 lb 6.4 oz)   09/04/19 84.9 kg (187 lb 3.2 oz)   08/30/19 85.7 kg (188 lb 14.4 oz)     Dosing Weight: 67 kg, based on adjusted wt (based on lowest wt this admit of 81.9 kg on 5/30 and IBW of 61.4 kg)    ASSESSED NUTRITION NEEDS  Estimated Energy Needs: 0376-0005 kcals/day (25 - 30 kcals/kg)  Justification: Maintenance  Estimated Protein Needs:  grams protein/day (1.2 - 1.5 grams of pro/kg)  Justification: Hypercatabolism with critical illness and Post-op  Estimated Fluid Needs: 1 mL/kcal  Justification: Maintenance and Per provider pending fluid status    SYSTEM AND PHYSICAL FINDINGS    New colostomy    GI symptoms: Reviewed; smoldering diverticulitis with abscess s/p open sigmoidectomy and end colostomy on 5/29. No colostomy output yet this admit. + flatus.  Skin/wounds: Reviewed; midline surgical abdominal wound    MALNUTRITION  % Intake: Unable to assess  % Weight Loss: Weight loss does not meet criteria   Subcutaneous Fat Loss: Unable to assess  Muscle Loss: Unable to assess  Fluid Accumulation/Edema: None noted  Malnutrition Diagnosis: Unable to determine due to unable to complete nutrition history or NFPE at this time - writer remote  Malnutrition Present on Admission: Unable to  assess    NUTRITION DIAGNOSIS  Increased nutrient needs (protein) related to s/p surgery as evidenced by assessed protein needs of  grams protein/day (1.2 - 1.5 grams of pro/kg).    INTERVENTIONS  Medical food supplement therapy  Vitamin and mineral supplement therapy    GOALS  Patient to consume % of nutritionally adequate meal trays TID, or the equivalent with supplements/snacks.     MONITORING/EVALUATION  Progress toward goals will be monitored and evaluated per policy.    Chreie Houston RD, LD  Available on Vocera - can search by name or unit Dietitian  **Clinical Nutrition is no longer available via pager

## 2025-05-30 NOTE — PHARMACY-VANCOMYCIN DOSING SERVICE
Pharmacy Vancomycin Note  Date of Service May 30, 2025  Patient's  1950   75 year old, female    Indication: Intra-abdominal infection  Day of Therapy: 2  Current vancomycin regimen:  750 mg IV once  Current vancomycin monitoring method: AUC  Current vancomycin therapeutic monitoring goal: 400-600 mg*h/L      Current estimated CrCl = Estimated Creatinine Clearance: 23.8 mL/min (A) (based on SCr of 2.21 mg/dL (H)).    Creatinine for last 3 days  2025: 11:36 AM Creatinine 2.44 mg/dL  2025:  1:43 PM Creatinine 2.99 mg/dL;  9:53 PM Creatinine 2.67 mg/dL  2025:  3:15 PM Creatinine 2.37 mg/dL  2025:  4:12 AM Creatinine 2.21 mg/dL    Recent Vancomycin Levels (past 3 days)  2025:  4:12 AM Vancomycin 5.4 ug/mL    Vancomycin IV Administrations (past 72 hours)                     vancomycin (VANCOCIN) 750 mg in sodium chloride 0.9 % 282.5 mL intermittent infusion (mg) 750 mg New Bag 25 0155                    Nephrotoxins and other renal medications (From now, onward)      Start     Dose/Rate Route Frequency Ordered Stop    25 0800  vancomycin (VANCOCIN) 750 mg in sodium chloride 0.9 % 282.5 mL intermittent infusion         750 mg  over 90 Minutes Intravenous ONCE 25 0718      25 0530  norepinephrine (LEVOPHED) 4 mg in  mL PERIPHERAL infusion         0.01-0.2 mcg/kg/min × 82.6 kg  3.1-62 mL/hr  Intravenous CONTINUOUS 25 0525      25 2240  vancomycin place horvath - receiving intermittent dosing         1 each Intravenous SEE ADMIN INSTRUCTIONS 25 2240      25 1800  [Held by provider]  tacrolimus (GENERIC EQUIVALENT) capsule 0.5 mg        (On hold since 2025 at 2131 until manually unheld; held by Trisha Bradley MDHold Reason: Abnormal Labs)    0.5 mg Oral 2 TIMES DAILY. 25 1448                 Contrast Orders - past 72 hours (72h ago, onward)      None            Interpretation of levels and current regimen:  Vancomycin level is  reflective of -600    Has serum creatinine changed greater than 50% in last 72 hours: No    Urine output:  good urine output    Renal Function: Improving    InsightRX Prediction of Planned New Vancomycin Regimen  Loading dose: N/A  Regimen: 750 mg IV once  Start time: 06:48 on 05/30/2025  Exposure target: AUC24 (range) 400-600 mg/L.hr   AUC24,ss: 502 mg/L.hr  Probability of AUC24 > 400: 88 %  Ctrough,ss: 17.3 mg/L  Probability of Ctrough,ss > 20: 26 %  Probability of nephrotoxicity (Lodise VERA 2009): 13 %      Plan:  Continue Current Dose Vancomycin 750 mg once  Vancomycin monitoring method: AUC  Vancomycin therapeutic monitoring goal: 400-600 mg*h/L  Pharmacy will check vancomycin levels as appropriate in 1-3 Days.  Serum creatinine levels will be ordered daily for the first week of therapy and at least twice weekly for subsequent weeks.    Tano Davis RPH   PGY-1 Pharmacy Resident

## 2025-05-30 NOTE — PROGRESS NOTES
[facility]    Progress Note - Colorectal Surgery Service       Date of Admission:  5/28/2025    Assessment & Plan: Surgery   Mrs. Thompson is a 75 year old female with history of liver transplant admitted for smoldering diverticulitis with 4.1cm abscess s/p open sigmoidectomy and end colostomy on 5/30/25, recovering appropriately.     - Recommend starting clear liquid diet  - Ok for DVT ppx from CRS perspective, continue to hold eliquis for now  - Continue ertapenem per ID recs   - Multimodal pain regimen  - Hx of liver transplant - immunosuppression per transplant immunology team   - PTA levothyroxine   - Continue WOC ostomy cares   - Ron removed POD0, continue PATY drain   - Encourage out of bed and ambulation, incentive spirometer use      Diet: NPO for Procedure/Surgery per Anesthesia Guidelines Except for: Meds; Clear liquids before procedure/surgery: NO clear liquids before procedure/surgery    DVT Prophylaxis: mechanical Ron Catheter: Not present  Lines: PRESENT      Arterial Line 05/29/25 Brachial-Site Assessment: WDL      Drains: PRESENT         - 1 Closed/Suction Drain(s)   Cardiac Monitoring: None  Code Status: Full Code      Clinically Significant Risk Factors          # Hypochloremia: Lowest Cl = 97 mmol/L in last 2 days, will monitor as appropriate  # Hyperchloremia: Highest Cl = 111 mmol/L in last 2 days, will monitor as appropriate      # Hypocalcemia: Lowest iCa = 3.9 mg/dL in last 2 days, will monitor and replace as appropriate     # Hypoalbuminemia: Lowest albumin = 2.4 g/dL at 5/29/2025  3:15 PM, will monitor as appropriate  # Coagulation Defect: INR = 1.49 (Ref range: 0.85 - 1.15) and/or PTT = 35 Seconds (Ref range: 22 - 38 Seconds), will monitor for bleeding   # Acute Kidney Injury, unspecified: based on a >150% or 0.3 mg/dL increase in last creatinine compared to past 90 day average, will monitor renal function  # Hypertension: Noted on  "problem list            # Overweight: Estimated body mass index is 28.28 kg/m  as calculated from the following:    Height as of this encounter: 1.702 m (5' 7\").    Weight as of this encounter: 81.9 kg (180 lb 8.9 oz)., PRESENT ON ADMISSION     # Financial/Environmental Concerns:           Social Drivers of Health   Housing Stability: Low Risk  (4/15/2025)    Housing Stability     Do you have housing? : Yes     Are you worried about losing your housing?: No   Recent Concern: Housing Stability - High Risk (1/22/2025)    Housing Stability     Do you have housing? : No     Are you worried about losing your housing?: No   Tobacco Use: Medium Risk (5/27/2025)    Patient History     Smoking Tobacco Use: Former     Smokeless Tobacco Use: Never   Transportation Needs: Low Risk  (4/15/2025)    Transportation Needs     Within the past 12 months, has lack of transportation kept you from medical appointments, getting your medicines, non-medical meetings or appointments, work, or from getting things that you need?: No   Recent Concern: Transportation Needs - High Risk (1/27/2025)    Transportation Needs     Within the past 12 months, has lack of transportation kept you from medical appointments, getting your medicines, non-medical meetings or appointments, work, or from getting things that you need?: Yes   Physical Activity: Insufficiently Active (11/9/2023)    Received from [facility]    Exercise Vital Sign     Days of Exercise per Week: 5 days     Minutes of Exercise per Session: 10 min         Disposition Plan     Pending tolerance of advancement of diet and return of bowel function      The patient's care was discussed with the Attending Physician, Dr. Campbell and Chief Resident/Fellow, Dr. Montano.    Katharina Soriano MD  General Surgery PGY1  [place]  Non-urgent messages: Securely message with [employer] (more info)  Text page via [facility] Paging/Directory "     ______________________________________________________________________    Interval History   Off of pressors this AM. She says her pain is well controlled, no nausea/vomiting. No ostomy gas or stool output.     Physical Exam   Vital Signs: Temp: 97  F (36.1  C) Temp src: Axillary BP: 102/73 Pulse: 78   Resp: 10 SpO2: 94 % O2 Device: None (Room air) Oxygen Delivery: 1 LPM  Weight: 180 lbs 8.91 oz  Intake/Output Summary (Last 24 hours) at 5/30/2025 0706  Last data filed at 5/30/2025 0700  Gross per 24 hour   Intake 2990.79 ml   Output 1360 ml   Net 1630.79 ml     General Appearance: Alert and oriented, no acute distress  Respiratory: nonlabored breathing on room air   Cardiovascular: regular rate   GI: soft, nondistended, appropriately tender to palpation, ostomy pink and well perfused with no gas or stool in bag, incision clean/dry/intact, PATY serosanguinous output 240ml/24 hrs   Skin: no rashes    Data     I have personally reviewed the following data over the past 24 hrs:    20.2 (H)  \   8.6 (L)   / 207     141 111 (H) 78.8 (H) /  161 (H)   4.3 17 (L) 2.21 (H) \     ALT: 53 (H) AST: 68 (H) AP: 196 (H) TBILI: 0.2   ALB: 2.4 (L) TOT PROTEIN: 4.6 (L) LIPASE: N/A     TSH: N/A T4: N/A A1C: 6.3 (H)     Procal: N/A CRP: N/A Lactic Acid: 1.0         Imaging results reviewed over the past 24 hrs:   No results found for this or any previous visit (from the past 24 hours).

## 2025-05-30 NOTE — PROGRESS NOTES
[facility]  Transplant & Immunocompromised Infectious Disease Progress Note  Patient:  Luz Thompson Date of birth 1950 MRN [id number]  Date of Visit:  05/30/2025  Reason for Consult   gram-negative septicemia  Assessment & Plan    Recommendations/Plan     Continue combination ertapenem 1 g daily and (Vancomycin or Linezolid).  Complete Ertapenem 1g daily on POD 7 - ending 6/5/25  If Levaquin susceptible this is an acceptable oral replacement for ertapenem for discharge if needed but she wants to make the change while inpatient since she has many reactions to medications.  Complete Vancomycin or Linezolid on POD 5 ( these are interchangeable) - end 6/3/25  Ensure susceptibility of the gram-negative to at least ertapenem, levofloxacin will assist in discharge planning if discharged before 6/5/2025    I will request ID clinic follow up with her usual outpatient TID physician.    Transplant ID will sign off    Signed:  Augustin Kam MD, Available on [employer]  Staff Physician, Transplant and General Infectious Diseases   For On Call and Coverage info see [facility]-> Infectious Disease Medicine Adult/[facility]    Patient Summary:   Transplants:  5/22/2002 (Liver); POD  8409.  Coordinator Angelika Frausto  This 75-year-old liver transplant recipient has had fever of unknown origin since June 2024 and background of history of Coccidioides.  Subsequently identified to have perforated diverticulitis, failed ertapenem then tigecycline now s/p surgery on 5/29.  Assessment and Discussion     # Gram-negative septicemia  # Perforated diverticulitis s/p surgery 5/29/2025    She was on tigecycline to cover the gram-negative infection as well as Enterococcus faecalis.   It looks like the abscess had decreased in size on imaging but an abscess pocket was identified intraoperatively.  Even though it was decreasing on tigecycline I am glad she got the surgery since there was likely ongoing micro  "leak causing the gram-negative bacteremia.      The Enterococcus faecalis could be treated with ampicillin vancomycin or linezolid but she has a listed penicillin allergy [see below] so we are treating with linezolid since the course will be brief.  Based on data from the STOP- IT trial a 3 to 5-day postoperative course will likely suffice.  I doubt the Enterococcus was causing major problems at the time of her surgery and it likely had been cleared by the tigecycline.    The gram-negative organism is likely of intra-abdominal origin which is now controlled.  A 7 day course will suffice.  Levofloxacin might be an option if the patient is willing to trial this agent.  She may not need home IV therapy.    # History of Coccidioides  Diagnosed 2016, did not tolerate fluconazole so was treated with 4 oteseconazole which she stopped in 2024.  Recent cocci antigens are negative.  Typically we try to do longer prophylaxis but she is adamant about not taking voriconazole    # Recurrent UTIs  She has baseline noninfectious urinary symptoms as well as infectious urinary symptoms.  She has become increasingly colonized with resistant organisms and her antibiotic allergy labels really limit our therapy.  She follows with Dr. Lundberg in clinic and can continue to do so    #Numerous reported antimicrobial allergies  Per allergy note 8/2/2019 \"Prick and intradermal and patch testing to fluconazole, doxycycline, azithromycin, penicillins, and cephalosporins with immediate and delayed readings being negative.\" States she will never take fluconazole again. Would be candidate for oral provocation testing in future with pcn. Also notes an allergy to the  capsules, states tolerates pills or liquid formulations, sometimes needs them compounded.    She now is adamant about not taking tigecycline as it caused perioral numbness that responded to Benadryl.  This is not very biologically compatible with true allergy but nevertheless " we are avoiding this agent for now.    Transplant Checklist  - Bacterial prophylaxis: None, see elsewhere for treatment Abx  - Pneumocystis (and possibly Toxo) prophylaxis: None  - Viral serostatus & prophylaxis: CMV -, EBV + ,  - None  - Fungal prophylaxis: none  - Additional endemic disease testing/Serology: No   - Immunization status: Due for tdap   - Gamma globulin status:  Recent Labs   Lab Test 08/05/19  1552   IGG 1,110     - Last Qtc 449 5/29/25    Subjective and Objective Data     History of Present Illness        History of Presenting Illness    Since she was last seen by infectious disease she has continued to improve.  She is going to work with physical therapy and get into the chair this afternoon.  She does not want to go back to the transitional care unit.  She is okay with trialing levofloxacin but reluctant to take the first dose at home.  She understands she will need a short course of antibiotics which will likely complete while inpatient.  She feels like she is as good as she could be after such a major surgery.    Review of Symptoms.  No fevers, chills, cough, dysuria.  Her abdominal pain is controlled, no rash.  Her oral symptoms are resolved.    Physical Exam     Vital signs Temp: 97.6  F (36.4  C) Temp src: Oral BP: 121/43 Pulse: 96   Resp: 21 SpO2: 98 % O2 Device: None (Room air) Oxygen Delivery: 1 LPM     GENERAL APPEARANCE: Not in acute distress    PHYSICAL EXAM:  Eyes:     No drainage  Mouth, Throat:     Mucous membranes moist  Cardiovascular:    S1, S2 normal, regular rate and rhythm.  Respiratory:     Inspection: Not in respiratory distress, chest expansion symmetrical.   Auscultation: 4 point auscultation done clear to auscultation bilaterally  Gastrointestinal:  Incisions are present left lower quadrant ostomy is present  Musculoskeletal:     No major deformity or tender effusion  Neurologic:     Higher Mental Function: Arousable but drowsy, hard of hearing   Motor: Moving all 4  limbs  Psychiatric: Appropriate drowsy  Skin:  Anicteric  Access: Right upper extremity PICC line does not look infected.    Data   Laboratory data and imaging listed below was reviewed prior to this encounter.   Microbiology:    Culture   Date Value Ref Range Status   05/28/2025 10,000-50,000 CFU/mL Mixture of Urogenital Louann  Final   05/28/2025 Positive on the 1st day of incubation (A)  Preliminary   05/28/2025 Klebsiella pneumoniae (AA)  Preliminary     Comment:     2 of 2 bottles   05/28/2025 Positive on the 1st day of incubation (A)  Preliminary   05/28/2025 Gram negative bacilli (AA)  Preliminary     Comment:     2 of 2 bottles   05/20/2025 Culture in progress  Corrected   05/20/2025 >100,000 CFU/mL Klebsiella oxytoca ESBL (A)  Preliminary   04/14/2025 No Growth  Final   04/14/2025 <10,000 CFU/mL Urogenital louann  Final   04/14/2025 No Growth  Final   04/07/2025 50,000-100,000 CFU/mL Klebsiella oxytoca ESBL (A)  Final   04/07/2025 50,000-100,000 CFU/mL Proteus vulgaris ESBL (A)  Final   03/09/2025 No Growth  Final   03/09/2025 No Growth  Final   03/08/2025 No Growth  Final   03/08/2025 >100,000 CFU/mL Klebsiella pneumoniae (A)  Final   03/08/2025 Positive on the 1st day of incubation (A)  Final   03/08/2025 Enterococcus faecalis (AA)  Final     Comment:     2 of 2 bottles   02/07/2025 10,000-50,000 CFU/mL Mixture of Urogenital Louann  Final   01/22/2025 10,000-50,000 CFU/mL Mixture of urogenital louann  Final   01/21/2025 >100,000 CFU/mL Mixture of Urogenital Louann  Final   01/20/2025 No Growth  Final   01/20/2025 No Growth  Final   01/20/2025 No Growth  Final   01/19/2025 <10,000 CFU/mL Mixture of Urogenital Louann  Final   01/19/2025 No Growth  Final   01/19/2025 Positive on the 1st day of incubation (A)  Final   01/19/2025 Staphylococcus epidermidis (AA)  Final     Comment:     1 of 2 bottles  The recovery of this organism from a single blood culture bottle most likely represents contamination. If  susceptibility testing is needed, please refer to the antibiogram or contact [facility]DL. If contamination is suspected, it is important to repeat two sets of blood cultures from two different sites.   01/10/2025 <10,000 CFU/mL Urogenital louann  Final   01/07/2025 No Growth  Final   01/06/2025 No Growth  Final   01/03/2025 No Growth  Final   01/03/2025 No Growth  Final   12/10/2024 No Growth  Final   12/10/2024 No Growth  Final   12/10/2024 >100,000 CFU/mL Klebsiella pneumoniae (A)  Final   11/06/2024 No Growth  Final   10/11/2024 No Growth  Final   10/11/2024 50,000-100,000 CFU/mL Mixture of Urogenital Louann  Final   09/30/2024 No Growth  Final   09/22/2024 >100,000 CFU/mL Klebsiella oxytoca ESBL (A)  Final   09/22/2024 Positive on the 1st day of incubation (A)  Final   09/22/2024 Klebsiella oxytoca ESBL (AA)  Final     Comment:     2 of 2 bottles   09/22/2024 Pseudomonas aeruginosa (AA)  Final     Comment:     1 of 2 bottles   08/20/2024 >100,000 CFU/mL Klebsiella oxytoca ESBL (A)  Final   , Inflammatory Markers:   Recent Labs   Lab Test 04/15/25  0613 08/26/19  2331 05/20/19  0957 07/30/18  0855 09/03/17  0912 09/02/17  0648 09/01/17  0832   SED 61* 78* 47* 73*  --   --   --    CRP  --  180.0* <2.9 5.2 64.0* 71.0* 51.0*   , Urine Studies:    Recent Labs   Lab Test 05/28/25  2138 05/20/25  2200 05/05/25  1800 04/29/25  0950 04/14/25  1430   LEUKEST Large* Moderate* Negative Trace* Moderate*   WBCU 79* 71* 1 5 85*   , Hematology Studies:    Recent Labs   Lab Test 05/30/25  0412 05/29/25  1515 05/29/25  1051 05/29/25  0546 05/28/25  1343 05/27/25  1136 05/19/25  1037 05/12/25  1140 05/05/25  1102 04/28/25  1213   WBC 20.2* 20.2*  --  9.1 9.3 11.3* 9.9 12.7* 15.6* 14.2*   ANEU  --   --   --   --  8.8* 8.7* 7.4 7.6 11.7* 10.4*   AEOS  --   --   --   --  0.0 0.1 0.1 0.2 0.1 0.1   HGB 8.6* 8.5* 8.5* 7.6* 9.9* 11.3* 9.2* 10.0* 10.1* 9.9*   MCV 87 87  --  88 86 90 90 89 90 88    215  --  200 291 370 357 413 400 497*    ,  Metabolic Studies:   Recent Labs   Lab Test 05/30/25  0412 05/29/25  1515 05/29/25  1051 05/28/25  2153 05/28/25  1343 05/27/25  1136    140 139 138 136 136   POTASSIUM 4.3 4.7 4.5 4.3 4.0 4.6   CHLORIDE 111* 110*  --  101 97* 97*   CO2 17* 18*  --  21* 21* 20*   BUN 78.8* 85.2*  --  107.1* 117.4* 103.0*   CR 2.21* 2.37*  --  2.67* 2.99* 2.44*   GFRESTIMATED 22* 21*  --  18* 16* 20*   , and Hepatic Studies:   Recent Labs   Lab Test 05/29/25  1515 05/28/25  1343 05/27/25  1136 05/19/25  1037 05/12/25  1140 05/05/25  1102   BILITOTAL 0.2 0.4 0.4 0.3 0.3 0.3   ALKPHOS 196* 160* 178* 118 124 101   ALBUMIN 2.4* 3.2* 3.6 3.0* 3.6 3.9   AST 68* 23 26 14 29 20   ALT 53* 27 28 22 47 14                    MDM/Coding Information     Medical Decision Making/Coding Information  I saw and evaluated this patient on todays date as part of an E&M Encounter     1- Number and Complexity of Problems Addressed as part the Encounter High    I addressed 1 or more acute or chronic illness or injury that poses a threat to life or bodily function gram-negative sepsis, major intra-abdominal infection  2- Amount and/or Complexity of Data to Be Reviewed and Analyzed moderate  I reviewed > 3 data points including specifically-  [the medical record for notes from other providers specifically prior infectious disease notes, notes from the primary team, colorectal notes, and recent labs including those above in data section of my note ,and incorporated these into my medical decision making   3- Risk of Complications and/or Morbidity or Mortality of Patient Management:  was high due to my recommendation for drug therapy requiring intensive monitoring (more than quarterly labs or clinical assessment)  for toxicity vancomycin necessitating close renal follow-up and intensive monitoring by team including pharmacy of levels for adequate dosing to prevent nephrotoxicity  4- This visit is eligible for the  Code due to complex infectious disease  decision making, antimicrobial therapy and management by an Infectious Disease Physician

## 2025-05-30 NOTE — PROGRESS NOTES
Received order for PIV insertion. Attempted x1 to insert in left forearm but patient wants writer to pull the PIV. Bedside RN aware.

## 2025-05-30 NOTE — PLAN OF CARE
Goal Outcome Evaluation:      Plan of Care Reviewed With: patient    Overall Patient Progress: no changeOverall Patient Progress: no change    Outcome Evaluation: Pt goal is to return [facility] to [facility] with home care RN/PT/OT/HHA services.

## 2025-05-30 NOTE — PLAN OF CARE
Goal Outcome Evaluation:    ICU End of Shift Summary. See flowsheets for vital signs and detailed assessment.    Changes this shift:   More alert. A/ox4, inter confusion. Severe pain w/ repositioning, prn fent given w/ some improvements. Stating that she's getting numbness on her lips after getting fentanyl given, refuses benadryl when offered. Lost all PIVs, only has a midline, vascular unable to insert PIV, MD aware. MAP <65 when asleep, levo restarted, 0.02. Had 3x episodes of bradycardia to 30s and pauses, asymptomatic and is hemodynamically stable. Ron removed, voided this am. Ostomy bag changed, 1x soft stool.      Plan:  bladder scan recheck 10am.

## 2025-05-30 NOTE — CONSULTS
Care Management Initial Consult    General Information  Assessment completed with: Patient, VM-chart review, [place]  Type of CM/SW Visit: Initial Assessment    Primary Care Provider verified and updated as needed: Yes   Readmission within the last 30 days: no previous admission in last 30 days      Reason for Consult: discharge planning  Advance Care Planning: Advance Care Planning Reviewed: present on chart, verified with patient, no concerns identified          Communication Assessment  Patient's communication style: spoken language (English or Bilingual)    Hearing Difficulty or Deaf: yes   Wear Glasses or Blind: yes    Cognitive  Cognitive/Neuro/Behavioral: .WDL except  Level of Consciousness: alert, lethargic  Arousal Level: arouses to voice, opens eyes spontaneously  Orientation: oriented x 4  Mood/Behavior: cooperative, calm  Best Language: 0 - No aphasia  Speech: clear    Living Environment:   People in home: alone     Current living Arrangements: independent living facility      Able to return to prior arrangements: yes       Family/Social Support:  Care provided by: self, child(zaid), homecare agency  Provides care for: no one  Marital Status: Single  Support system: Children          Description of Support System: Supportive, Involved    Support Assessment: Adequate family and caregiver support, Adequate social supports    Current Resources:   Patient receiving home care services: No        Community Resources: DME, Housekeeping/Chore Agency  Equipment currently used at home: grab bar, toilet, grab bar, tub/shower, walker, rolling, hospital bed  Supplies currently used at home: Other, Nutritional Supplements, Diabetic Supplies, Incontinence Supplies (Protein shakes; Neil II for DM2)    Employment/Financial:  Employment Status: retired        Financial Concerns: none           Does the patient's insurance plan have a 3 day qualifying hospital stay waiver?  No    Lifestyle & Psychosocial Needs:  Social  Drivers of Health     Food Insecurity: Low Risk  (4/15/2025)    Food Insecurity     Within the past 12 months, did you worry that your food would run out before you got money to buy more?: No     Within the past 12 months, did the food you bought just not last and you didn t have money to get more?: No   Depression: Not at risk (4/2/2025)    PHQ-2     PHQ-2 Score: 0   Housing Stability: Low Risk  (4/15/2025)    Housing Stability     Do you have housing? : Yes     Are you worried about losing your housing?: No   Recent Concern: Housing Stability - High Risk (1/22/2025)    Housing Stability     Do you have housing? : No     Are you worried about losing your housing?: No   Tobacco Use: Medium Risk (5/27/2025)    Patient History     Smoking Tobacco Use: Former     Smokeless Tobacco Use: Never     Passive Exposure: Not on file   Financial Resource Strain: Low Risk  (4/15/2025)    Financial Resource Strain     Within the past 12 months, have you or your family members you live with been unable to get utilities (heat, electricity) when it was really needed?: No   Alcohol Use: Not At Risk (11/9/2023)    Received from [email or web address]    AUDIT-C     Frequency of Alcohol Consumption: Never     Average Number of Drinks: Patient does not drink     Frequency of Binge Drinking: Never   Transportation Needs: Low Risk  (4/15/2025)    Transportation Needs     Within the past 12 months, has lack of transportation kept you from medical appointments, getting your medicines, non-medical meetings or appointments, work, or from getting things that you need?: No   Recent Concern: Transportation Needs - High Risk (1/27/2025)    Transportation Needs     Within the past 12 months, has lack of transportation kept you from medical appointments, getting your medicines, non-medical meetings or appointments, work, or from getting things that you need?: Yes   Physical Activity: Insufficiently Active (11/9/2023)    Received from [email or web address]    Exercise  "Vital Sign     Days of Exercise per Week: 5 days     Minutes of Exercise per Session: 10 min   Interpersonal Safety: Low Risk  (4/15/2025)    Interpersonal Safety     Do you feel physically and emotionally safe where you currently live?: Yes     Within the past 12 months, have you been hit, slapped, kicked or otherwise physically hurt by someone?: No     Within the past 12 months, have you been humiliated or emotionally abused in other ways by your partner or ex-partner?: No   Stress: No Stress Concern Present (11/9/2023)    Received from [facility]    [facility] - Occupational Stress Questionnaire     Feeling of Stress : Only a little   Social Connections: Feeling Socially Integrated (12/20/2023)    Received from [facility]    OASIS : Social Isolation     Frequency of experiencing loneliness or isolation: Never   Health Literacy: Not on file       Functional Status:  Prior to admission patient needed assistance:   Dependent ADLs:: Ambulation-cane, Ambulation-walker, Incontinence  Dependent IADLs:: Cleaning, Cooking, Laundry (Cleaning services and two meals a day provided by facility)       Mental Health Status:  Mental Health Status: Current Concern  Mental Health Management: Medication    Chemical Dependency Status:  Chemical Dependency Status: No Current Concerns             Values/Beliefs:  Spiritual, Cultural Beliefs, Jehovah's witness Practices, Values that affect care: no (Miky)               Discussed  Partnership in Safe Discharge Planning  document with patient/family: Yes: Pt in agreement with collaborative safe discharge planning.     Additional Information:  Pt is \"a 75 year old female who was admitted on 5/28/2025 with past medical history of atrial fibrillation, DVT on eliquis, CVA, CKD III, HFpEF (TTE 3/9/25 EF 60-65%), T2DM,  biliary cirrhosis s/p liver transplant in 2002, EBV, HTN, HLD, Sjogren's syndrome, Basal Cell Carincoma s/p MOHS on 4/8/25, thyroid cancer s/p " "thyroidectomy in 2010, Sjogren's syndrome, and diverticulitis with recent admission from 4/14-4/23 for ESBL UTI & E. Faecalis bacteremia related to sigmoid microperforation (treated with IV ertapenem) who now presents for 1-day history of lightheadedness, hypotension, worsening non-bloody diarrhea, and abdominal pain that started on 5/28.\" - per H&P.     Met with patient to introduce self/role and complete initial assessment. Confirmed patient's address, PCP, emergency contacts and insurance.    Pt reports she came to the hospital from [facility]. She does not have a bed hold and her preference is to return home at discharge. She lives in an Independent Living Facility where she receives 2 meals a day and daily well-being checks via an \"I'm Ok\" button. She also has a button to push if she falls in the home and a pager if she falls outside of the home. Her granddaughter comes twice a month to clean and she receives frequent check in's and support for any needs from her children Gloria and Luciano who both live approximately 30 min away. She has received home care services in the past from [facility] for RN/PT/OT/HHA and her preference would be to again receive home care services from this agency, but she is also agreeable to placement with other agencies if necessary. She reports she feels she would not need any other services or DME to return to home safely.     Pt denied any chem dep or financial concerns. She reports she is taking Zoloft for depression and is in the process of having this medication titrated to therapeutic levels. She declined further mental health support or needs.     Writer informed Pt a home care RN/PT/OT/HHA referral would be made with [facility] listed as preferred agency and that Pt would be updated with any changes in plan and/or accepting agencies.     Per Pt's RN, pain control has been an issue. Pt has pending consults with PT/OT/and ID as well as  a new " ostomy.     Next Steps:   []Watch for updates from Consults/re: additional needs.   []Follow home care referral for RN/PT/OT/HHA with [facility] as preferred agency.   []Update Patient and Care Team.   []IMM and IHO at discharge    Zamzam Mathews RNCC  Covering for 4A/4C  Phone [phone]      RN Care Coordinator     Social Work and Care Management Department      [facility]ABLE in [facility] - search CARE COORDINATOR       [employer] ([phone]) Saturday & Sunday; [phone]) [facility] Recognized Holidays     Units: 5A Onc Vocera & 5C Vocera    Units: 6B Vocera & 6C Vocera    Units: 7A SOT RNCC Vocera, 7B Med Surg Vocera, & 7C Med Surg Vocera    Units: 6A Vocera & 4A CVICU Vocera, 4C MICU Vocera, and 4E SICU Vocera    Units: 5 Ortho Vocera & 5 Med Surg Vocera    Units: 6 Med Surg Vocera & 8 Med Surg Vocera

## 2025-05-31 ENCOUNTER — APPOINTMENT (OUTPATIENT)
Dept: PHYSICAL THERAPY | Facility: CLINIC | Age: 75
DRG: 329 | End: 2025-05-31
Attending: PHYSICIAN ASSISTANT
Payer: MEDICARE

## 2025-05-31 ENCOUNTER — APPOINTMENT (OUTPATIENT)
Dept: OCCUPATIONAL THERAPY | Facility: CLINIC | Age: 75
DRG: 329 | End: 2025-05-31
Payer: MEDICARE

## 2025-05-31 LAB
ANION GAP SERPL CALCULATED.3IONS-SCNC: 12 MMOL/L (ref 7–15)
BUN SERPL-MCNC: 80.2 MG/DL (ref 8–23)
CALCIUM SERPL-MCNC: 7.7 MG/DL (ref 8.8–10.4)
CHLORIDE SERPL-SCNC: 110 MMOL/L (ref 98–107)
CREAT SERPL-MCNC: 2.39 MG/DL (ref 0.51–0.95)
EGFRCR SERPLBLD CKD-EPI 2021: 20 ML/MIN/1.73M2
ERYTHROCYTE [DISTWIDTH] IN BLOOD BY AUTOMATED COUNT: 18.9 % (ref 10–15)
GLUCOSE BLDC GLUCOMTR-MCNC: 155 MG/DL (ref 70–99)
GLUCOSE BLDC GLUCOMTR-MCNC: 158 MG/DL (ref 70–99)
GLUCOSE BLDC GLUCOMTR-MCNC: 190 MG/DL (ref 70–99)
GLUCOSE BLDC GLUCOMTR-MCNC: 191 MG/DL (ref 70–99)
GLUCOSE SERPL-MCNC: 177 MG/DL (ref 70–99)
HCO3 SERPL-SCNC: 19 MMOL/L (ref 22–29)
HCT VFR BLD AUTO: 24.6 % (ref 35–47)
HGB BLD-MCNC: 7.8 G/DL (ref 11.7–15.7)
MAGNESIUM SERPL-MCNC: 2.2 MG/DL (ref 1.7–2.3)
MCH RBC QN AUTO: 28.3 PG (ref 26.5–33)
MCHC RBC AUTO-ENTMCNC: 31.7 G/DL (ref 31.5–36.5)
MCV RBC AUTO: 89 FL (ref 78–100)
PHOSPHATE SERPL-MCNC: 5.8 MG/DL (ref 2.5–4.5)
PLATELET # BLD AUTO: 194 10E3/UL (ref 150–450)
POTASSIUM SERPL-SCNC: 5 MMOL/L (ref 3.4–5.3)
RBC # BLD AUTO: 2.76 10E6/UL (ref 3.8–5.2)
SODIUM SERPL-SCNC: 141 MMOL/L (ref 135–145)
VANCOMYCIN SERPL-MCNC: 9.8 UG/ML (ref ?–25)
WBC # BLD AUTO: 19.4 10E3/UL (ref 4–11)

## 2025-05-31 PROCEDURE — 250N000013 HC RX MED GY IP 250 OP 250 PS 637: Performed by: PHYSICIAN ASSISTANT

## 2025-05-31 PROCEDURE — 97161 PT EVAL LOW COMPLEX 20 MIN: CPT | Mod: GP | Performed by: PHYSICAL THERAPIST

## 2025-05-31 PROCEDURE — 80048 BASIC METABOLIC PNL TOTAL CA: CPT

## 2025-05-31 PROCEDURE — 250N000013 HC RX MED GY IP 250 OP 250 PS 637: Performed by: INTERNAL MEDICINE

## 2025-05-31 PROCEDURE — 36415 COLL VENOUS BLD VENIPUNCTURE: CPT

## 2025-05-31 PROCEDURE — 84100 ASSAY OF PHOSPHORUS: CPT | Performed by: INTERNAL MEDICINE

## 2025-05-31 PROCEDURE — 36415 COLL VENOUS BLD VENIPUNCTURE: CPT | Performed by: INTERNAL MEDICINE

## 2025-05-31 PROCEDURE — 87040 BLOOD CULTURE FOR BACTERIA: CPT | Performed by: INTERNAL MEDICINE

## 2025-05-31 PROCEDURE — 80202 ASSAY OF VANCOMYCIN: CPT | Performed by: INTERNAL MEDICINE

## 2025-05-31 PROCEDURE — 250N000013 HC RX MED GY IP 250 OP 250 PS 637

## 2025-05-31 PROCEDURE — 99232 SBSQ HOSP IP/OBS MODERATE 35: CPT | Mod: 24 | Performed by: SURGERY

## 2025-05-31 PROCEDURE — 200N000002 HC R&B ICU UMMC

## 2025-05-31 PROCEDURE — 250N000011 HC RX IP 250 OP 636: Performed by: INTERNAL MEDICINE

## 2025-05-31 PROCEDURE — 250N000013 HC RX MED GY IP 250 OP 250 PS 637: Performed by: SURGERY

## 2025-05-31 PROCEDURE — 97530 THERAPEUTIC ACTIVITIES: CPT | Mod: GO

## 2025-05-31 PROCEDURE — 250N000011 HC RX IP 250 OP 636

## 2025-05-31 PROCEDURE — 250N000011 HC RX IP 250 OP 636: Mod: JZ | Performed by: NURSE PRACTITIONER

## 2025-05-31 PROCEDURE — 87154 CUL TYP ID BLD PTHGN 6+ TRGT: CPT | Performed by: INTERNAL MEDICINE

## 2025-05-31 PROCEDURE — 97530 THERAPEUTIC ACTIVITIES: CPT | Mod: GP | Performed by: PHYSICAL THERAPIST

## 2025-05-31 PROCEDURE — 258N000003 HC RX IP 258 OP 636: Performed by: INTERNAL MEDICINE

## 2025-05-31 PROCEDURE — 83735 ASSAY OF MAGNESIUM: CPT | Performed by: INTERNAL MEDICINE

## 2025-05-31 PROCEDURE — 85018 HEMOGLOBIN: CPT | Performed by: PHYSICIAN ASSISTANT

## 2025-05-31 RX ORDER — DEXTROSE MONOHYDRATE 25 G/50ML
25-50 INJECTION, SOLUTION INTRAVENOUS
Status: DISCONTINUED | OUTPATIENT
Start: 2025-05-31 | End: 2025-06-01

## 2025-05-31 RX ORDER — VITAMIN A 3000 MCG
20000 CAPSULE ORAL DAILY
Status: DISCONTINUED | OUTPATIENT
Start: 2025-05-31 | End: 2025-06-12 | Stop reason: HOSPADM

## 2025-05-31 RX ORDER — MEROPENEM 500 MG/1
500 INJECTION, POWDER, FOR SOLUTION INTRAVENOUS EVERY 12 HOURS
Status: DISCONTINUED | OUTPATIENT
Start: 2025-05-31 | End: 2025-06-03

## 2025-05-31 RX ORDER — HYDROMORPHONE HCL IN WATER/PF 6 MG/30 ML
0.1 PATIENT CONTROLLED ANALGESIA SYRINGE INTRAVENOUS
Status: DISCONTINUED | OUTPATIENT
Start: 2025-05-31 | End: 2025-06-01

## 2025-05-31 RX ORDER — NICOTINE POLACRILEX 4 MG
15-30 LOZENGE BUCCAL
Status: DISCONTINUED | OUTPATIENT
Start: 2025-05-31 | End: 2025-06-01

## 2025-05-31 RX ADMIN — DIPHENHYDRAMINE HYDROCHLORIDE 25 MG: 25 SOLUTION ORAL at 08:40

## 2025-05-31 RX ADMIN — METHOCARBAMOL 500 MG: 500 TABLET ORAL at 11:55

## 2025-05-31 RX ADMIN — URSODIOL 250 MG: 250 TABLET ORAL at 20:07

## 2025-05-31 RX ADMIN — METHOCARBAMOL 500 MG: 500 TABLET ORAL at 07:59

## 2025-05-31 RX ADMIN — Medication 12.5 MG: at 07:57

## 2025-05-31 RX ADMIN — HEPARIN SODIUM 5000 UNITS: 5000 INJECTION, SOLUTION INTRAVENOUS; SUBCUTANEOUS at 11:55

## 2025-05-31 RX ADMIN — HEPARIN SODIUM 5000 UNITS: 5000 INJECTION, SOLUTION INTRAVENOUS; SUBCUTANEOUS at 03:56

## 2025-05-31 RX ADMIN — OXYCODONE HYDROCHLORIDE 5 MG: 5 TABLET ORAL at 08:41

## 2025-05-31 RX ADMIN — NYSTATIN 1000000 UNITS: 100000 SUSPENSION ORAL at 17:31

## 2025-05-31 RX ADMIN — METHOCARBAMOL 500 MG: 500 TABLET ORAL at 17:31

## 2025-05-31 RX ADMIN — MEROPENEM 500 MG: 500 INJECTION, POWDER, FOR SOLUTION INTRAVENOUS at 13:32

## 2025-05-31 RX ADMIN — INSULIN ASPART 1 UNITS: 100 INJECTION, SOLUTION INTRAVENOUS; SUBCUTANEOUS at 12:08

## 2025-05-31 RX ADMIN — INSULIN ASPART 1 UNITS: 100 INJECTION, SOLUTION INTRAVENOUS; SUBCUTANEOUS at 07:58

## 2025-05-31 RX ADMIN — HEPARIN SODIUM 5000 UNITS: 5000 INJECTION, SOLUTION INTRAVENOUS; SUBCUTANEOUS at 20:21

## 2025-05-31 RX ADMIN — NYSTATIN 1000000 UNITS: 100000 SUSPENSION ORAL at 20:08

## 2025-05-31 RX ADMIN — THERA TABS 1 TABLET: TAB at 08:00

## 2025-05-31 RX ADMIN — HYDROCORTISONE SODIUM SUCCINATE 100 MG: 100 INJECTION, POWDER, FOR SOLUTION INTRAMUSCULAR; INTRAVENOUS at 21:30

## 2025-05-31 RX ADMIN — ACETAMINOPHEN 975 MG: 325 TABLET, FILM COATED ORAL at 20:07

## 2025-05-31 RX ADMIN — SERTRALINE HYDROCHLORIDE 50 MG: 50 TABLET ORAL at 08:00

## 2025-05-31 RX ADMIN — VANCOMYCIN HYDROCHLORIDE 750 MG: 1 INJECTION, POWDER, LYOPHILIZED, FOR SOLUTION INTRAVENOUS at 11:52

## 2025-05-31 RX ADMIN — URSODIOL 250 MG: 250 TABLET ORAL at 07:58

## 2025-05-31 RX ADMIN — ACETAMINOPHEN 975 MG: 325 TABLET, FILM COATED ORAL at 07:59

## 2025-05-31 RX ADMIN — DICLOFENAC SODIUM 4 G: 10 GEL TOPICAL at 08:00

## 2025-05-31 RX ADMIN — LEVOTHYROXINE SODIUM 175 MCG: 0.17 TABLET ORAL at 07:57

## 2025-05-31 RX ADMIN — DICLOFENAC SODIUM 4 G: 10 GEL TOPICAL at 17:31

## 2025-05-31 RX ADMIN — DICLOFENAC SODIUM 4 G: 10 GEL TOPICAL at 20:08

## 2025-05-31 RX ADMIN — CALCITRIOL CAPSULES 0.25 MCG 0.25 MCG: 0.25 CAPSULE ORAL at 07:59

## 2025-05-31 RX ADMIN — HYDROCORTISONE SODIUM SUCCINATE 100 MG: 100 INJECTION, POWDER, FOR SOLUTION INTRAMUSCULAR; INTRAVENOUS at 10:21

## 2025-05-31 RX ADMIN — EZETIMIBE 10 MG: 10 TABLET ORAL at 21:21

## 2025-05-31 RX ADMIN — NYSTATIN 1000000 UNITS: 100000 SUSPENSION ORAL at 11:55

## 2025-05-31 RX ADMIN — INSULIN ASPART 1 UNITS: 100 INJECTION, SOLUTION INTRAVENOUS; SUBCUTANEOUS at 17:36

## 2025-05-31 RX ADMIN — GABAPENTIN 300 MG: 250 SOLUTION ORAL at 21:21

## 2025-05-31 RX ADMIN — LIDOCAINE 2 PATCH: 4 PATCH TOPICAL at 08:41

## 2025-05-31 RX ADMIN — HYDROCORTISONE SODIUM SUCCINATE 100 MG: 100 INJECTION, POWDER, FOR SOLUTION INTRAMUSCULAR; INTRAVENOUS at 03:56

## 2025-05-31 RX ADMIN — Medication 20000 UNITS: at 11:55

## 2025-05-31 RX ADMIN — HYDROCORTISONE SODIUM SUCCINATE 100 MG: 100 INJECTION, POWDER, FOR SOLUTION INTRAMUSCULAR; INTRAVENOUS at 17:31

## 2025-05-31 RX ADMIN — OXYCODONE HYDROCHLORIDE 5 MG: 5 TABLET ORAL at 18:24

## 2025-05-31 RX ADMIN — Medication 1000 MCG: at 21:21

## 2025-05-31 RX ADMIN — Medication 1 EACH: at 17:31

## 2025-05-31 RX ADMIN — METHOCARBAMOL 500 MG: 500 TABLET ORAL at 20:07

## 2025-05-31 RX ADMIN — NYSTATIN 1000000 UNITS: 100000 SUSPENSION ORAL at 07:59

## 2025-05-31 ASSESSMENT — ACTIVITIES OF DAILY LIVING (ADL)
ADLS_ACUITY_SCORE: 59

## 2025-05-31 NOTE — PROGRESS NOTES
SURGICAL Progress NOTE  05/31/2025      Date of Service (when I saw the patient): 05/31/2025    ASSESSMENT:  Luz Thompson is a 75 year old female who was admitted on 5/28/2025 with past medical history of atrial fibrillation, DVT on eliquis, CVA, CKD III, HFpEF (TTE 3/9/25 EF 60-65%), T2DM,  biliary cirrhosis s/p liver transplant in 2002, EBV, HTN, HLD, Sjogren's syndrome, Basal Cell Carincoma s/p MOHS on 4/8/25, thyroid cancer s/p thyroidectomy in 2010, Sjogren's syndrome, and diverticulitis with recent admission from 4/14-4/23 for ESBL UTI & E. Faecalis bacteremia related to sigmoid microperforation (treated with IV ertapenem) who now presents for 1-day history of lightheadedness, hypotension, worsening non-bloody diarrhea, and abdominal pain that started on 5/28.     Seen in colorectal surgery clinic (5/27) where it was decided that she should undergo open sigmoidectomy with diverting loop ileostomy. She presented in the ED for abdominal pain, hypotension per EMS and CRS recommending surgical intervention.    5/29/2025:  - CRS: Dr. Campbell: Exploratory laparotomy, sigmoidectomy with end colostomy.  Cystoscopy with bilateral stent placement with urology team.  5/30:   - hypotensive, 500ml LR bolus minimal response, Levophe gtt restarted. Decreased opiates doses        Home medications  Tylenol 500 twice daily  Amlodipine 5 mg daily  Apixaban 2.5 mg twice daily  Calcitriol 0.25 mcg daily  D-mannose p.o. twice daily  Evolocumab 140 mg subcu every 14 days  Ezetimibe 10 mg daily  Ferrous sulfate 325 daily  Folic acid 1 mg daily  Gabapentin 20 mg nightly  Hydralazine 50 mg 3 times daily  Metoprolol 25mg XL daily      PLAN:  - goal SBP >90 or MAP >60  - hold metoprolol  - ertapenem to meropenem  - repeat blood culture    Neurological:  # Acute post op pain  # high risk delirium   - delirium precaution   - Monitor neurological status. Delirium preventions and precautions.   - Pain:  Robaxin QID, tylenol 975mg  BID, gabapentin 300mg QHS,Lidocaine patches Daily, Diclofenac QID  -  PRN: PO oxycodone 5mg, IV dilaudid 0.1mg   - Sedation plan: NONE  - PTA sertraline  - melatonin Q HS  - PRN narcan      Pulmonary:   # Post operative ventilatory support   # Acute hypoxic respiratory support   - Supplemental oxygen to keep saturation above 92 %.  - Incentive spirometer every 15- 30 minutes.   - PRN albuterol   - Room air      Cardiovascular:    # Paroxysmal A-fib on apixaban   # Hypertension  # HFpEF (TTE 3/9/25 EF 60-65%)   # history of CVA  # Vasoplagia  - Monitor hemodynamic status.  - Levophed off since 0400  - MAP goal >65  - PTA ezetimibe   - HOLD PTA apixaban 2.5mg BID  - 5/30 SQH  - HOLD  metoprolol tartrate 12.5 mg BID with hold parameters  - SBP >100 or MAP >65    Gastroenterology/Nutrition:  # primary biliary cirrhosis  # Status post liver transplant 2002  # Diverticulitis  # Status post exploratory laparotomy with Sigmoidectomy with end colostomy  # Protein calorie deficit malnutrition    - RD consult. Appreciate cares and recommendations.   - FLD   - PTA Nystatin QID  - PTA Ursodiol   - Supplement: Nutrisource   - PRN Tums, zofran  Hepatology Recs 5/30:  - Ok to hold prednisone 9 mg while patient is on IV hydrocortisone, would resume if she comes off  - Management of antibiotics per ID   - Await tacrolimus level, continue to hold tacrolimus  - Discontinue sirolimus   - Continue ursodiol   - Hepatology will continue to follow  CRS recs: 5/30  - Ok for DVT ppx from CRS perspective, continue to hold eliquis for now  - Continue ertapenem per ID recs   - Multimodal pain regimen  - Hx of liver transplant - immunosuppression per transplant immunology team   - PTA levothyroxine   - Continue WOC ostomy cares   - Ron removed POD0, continue PATY drain   - Encourage out of bed and ambulation, incentive spirometer use      Fluids/Electrolytes:   - electrolyte replacement protocol    - daily weights  - urine output improved 450ml  last 8 hours      Renal/:  # Chronic kidney disease III  # Acute kidney injury   # Bilateral stent placement per urology 5/29  - Baseline creatinine 1.3-1.6  - Cr 2.21  - external catheter   - PTA estradiol vaginal  cream      Endocrine:  # Type 2 diabetes  # Stress hyperglycemia    - Sliding scale for glucose management  - Goal to keep BG< 180 for optimal wound healing   - Glucose AC/HS  - hypoglycemic protocol  - hydrocortisone 100mg Q6H  # Thyroid cancer status post thyroidectomy 2010  - PTA calcitriol 0.25mcg  - PTA levothyroxine 175mcg      ID:  # Reactive Leukocytosis   # EBV  # Sjogren's syndrome  # ESBL UTI with E faecalis bacteremia April 14 to 23 2025  Transplant ID consulted 5/28  - pending recs  # immunosuppression   - appreciate hepatology  - HOLD tacrolimus  - hydrocortisone 100mg Q6H, page Transplant ID for confirmation 5/31  # immunoprophylaxis  # perioperative antibiotics:   - switched Ertapenem to 5/31 meropenem for pseudomonas coverage  - WBC 19.4 stable  - daily CBC  - HOLD PTA prednisone 9mg daily  - 5/31 Repeat Blood cultures x2  - 5/31 Ertapenem to meropenem for pseudomonas coverage      Positive cultures:  - 2/2 Gram Negative Bacilli from 5/28/25  - 5/28 Klebsiella pneumoniae 2/2  -5/31 Pseudomonas 1/2       Heme:     # Acute blood loss anemia    # Anemia of critical illness  # coagulopathy due to cirrhosis and surgical blood loss    # thrombocytopenia   # anemia of critical illness   - Platelet 206   - Hemoglobin 7.8  - Transfuse if hgb <7.0 or signs/symptoms of hypoperfusion. Monitor and trend.   - HOLD PTA apixaban 2.5mg BID  - PTA Ferous sulfate, folic acid  - start SQH      Musculoskeletal:   # Weakness and deconditioning of critical illness   - Physical and occupational therapy consult   - up with assist, fall precaution       Skin:  # Basal cell carcinoma status post Mohs April 2025  # pruritus   - PRN bendryl PO      General Cares/Prophylaxis:    DVT Prophylaxis: SCDs. SQH  GI  "Prophylaxis: Not indicated  Restraints: Restraints for medical healing needed: NO    Lines/ tubes/ drains:  - Midline  - PATY drain right abdomen  - art line       Disposition:  -  Surgical ICU    Patient seen, findings and plan discussed with surgical ICU staff, Dr. Oneal.        - - - - - - - - - - - - - - - - - - - - - - - - - - - - - - - - - - - - - - - - - - - - - -   Interval History:  Patient is up sitting on the chair. Reports she got some sleep last night. Pain is better this AM. Denies any nausea/vomiting    REVIEW OF SYSTEMS: 10 point ROS neg other than the symptoms noted above in the HPI.    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       SURGICAL HISTORY:   Past Surgical History:   Procedure Laterality Date    APPENDECTOMY  1961    CATARACT IOL, RT/LT      RE12/19/2013, LE12/10/2013 - Toric lenses    CHOLECYSTECTOMY  1991    COLECTOMY WITH COLOSTOMY, COMBINED N/A 5/29/2025    Procedure: Open sigmoidectomy with end colostmy;  Surgeon: Al Campbell MD;  Location: [facility] OR    COLONOSCOPY  03/10/2014    Procedure: COLONOSCOPY;;  Surgeon: Gentry Ramirez MD;  Location: [facility] GI    CYSTOSCOPY      ear drum repair      ENDOBRONCHIAL ULTRASOUND FLEXIBLE N/A 09/29/2017    Procedure: ENDOBRONCHIAL ULTRASOUND FLEXIBLE;  Flexible Bronchoscopy, Endobronchial Ultrasound, Transbronchial Needle Aspiration ;  Surgeon: Eden Clinton MD;  Location: [facility] OR    ENDOSCOPIC RETROGRADE CHOLANGIOPANCREATOGRAM  09/19/2013    Procedure: ENDOSCOPIC RETROGRADE CHOLANGIOPANCREATOGRAM;  Endoscopic Retrograde Cholangiopancreatogram with single balloon enteroscopy, ballon sweep of bile duct;  Surgeon: Brett Membreno MD;  Location: [facility] OR    HC KNEE SCOPE,MED/LAT MENISECTOMY Right 08/10/2012    partial medial menisectomy only    INSERT STENT URETER Bilateral 5/29/2025    Procedure: bilateral Insert and removal stent ureter, cystoscopy;  Surgeon: David Leung MD;  Location: [facility] OR    KNEE SURGERY  1966    R knee    PICC INSERTION  09/18/2013    4fr SL PASV PICC, 40cm (1cm external) in the R basilic vein w/ tip in the low SVC    PICC INSERTION  02/21/2014    5 fr DL BioFlo Navilyst PICC, 46 cm (3 cm external) in the L basilic vein w/ tip in the SVC RA junction.    THYROIDECTOMY  03/2010    TRANSPLANT LIVER RECIPIENT LIVING UNRELATED  05/2002       SOCIAL HISTORY:   Social History     Socioeconomic History    Marital status:      Spouse name: Robbin Thompson    Number of children: 4    Years of education: 20   Occupational History    Occupation: Guardian Conservator      Employer: [employer]     Comment:  "social work     Employer: S[employer]   Tobacco Use    Smoking status: Former     Current packs/day: 0.00     Average packs/day: 1 pack/day for 18.0 years (18.0 ttl pk-yrs)     Types: Cigarettes     Start date: 1967     Quit date: 1985     Years since quittin.1    Smokeless tobacco: Never   Vaping Use    Vaping status: Never Used   Substance and Sexual Activity    Alcohol use: Yes     Alcohol/week: 0.0 standard drinks of alcohol     Comment: rare - \"I toast at weddings\"    Drug use: No    Sexual activity: Yes     Partners: Male     Birth control/protection: Post-menopausal   Other Topics Concern    Exercise Yes     Comment: cardio and strengthing   Social History Narrative    She is retired. [place], [place], corn and soybean crops.     Social Drivers of Health     Financial Resource Strain: Low Risk  (4/15/2025)    Financial Resource Strain     Within the past 12 months, have you or your family members you live with been unable to get utilities (heat, electricity) when it was really needed?: No   Food Insecurity: Low Risk  (4/15/2025)    Food Insecurity     Within the past 12 months, did you worry that your food would run out before you got money to buy more?: No     Within the past 12 months, did the food you bought just not last and you didn t have money to get more?: No   Transportation Needs: Low Risk  (4/15/2025)    Transportation Needs     Within the past 12 months, has lack of transportation kept you from medical appointments, getting your medicines, non-medical meetings or appointments, work, or from getting things that you need?: No   Recent Concern: Transportation Needs - High Risk (2025)    Transportation Needs     Within the past 12 months, has lack of transportation kept you from medical appointments, getting your medicines, non-medical meetings or appointments, " work, or from getting things that you need?: Yes   Physical Activity: Insufficiently Active (11/9/2023)    Received from [facility]    Exercise Vital Sign     Days of Exercise per Week: 5 days     Minutes of Exercise per Session: 10 min   Stress: No Stress Concern Present (11/9/2023)    Received from [facility]    [employer] - Occupational Stress Questionnaire     Feeling of Stress : Only a little   Social Connections: Feeling Socially Integrated (12/20/2023)    Received from [facility]    OASIS : Social Isolation     Frequency of experiencing loneliness or isolation: Never   Interpersonal Safety: Low Risk  (4/15/2025)    Interpersonal Safety     Do you feel physically and emotionally safe where you currently live?: Yes     Within the past 12 months, have you been hit, slapped, kicked or otherwise physically hurt by someone?: No     Within the past 12 months, have you been humiliated or emotionally abused in other ways by your partner or ex-partner?: No   Housing Stability: Low Risk  (4/15/2025)    Housing Stability     Do you have housing? : Yes     Are you worried about losing your housing?: No   Recent Concern: Housing Stability - High Risk (1/22/2025)    Housing Stability     Do you have housing? : No     Are you worried about losing your housing?: No     FAMILY HISTORY: No bleeding/clotting disorders nor problems with anesthesia.    ALLERGIES:   Allergies   Allergen Reactions    Fluconazole Hives and Itching     Full body hives  **Intradermal skin testing negative**  [See intradermal skin testing results from 8/2/2019]    Mycophenolate Diarrhea and Nausea and Vomiting     Patient stated it was chronic and lasted months      Penicillins Anaphylaxis, Hives, Itching and Rash     **Intradermal skin testing negative**  [See intradermal skin testing results from 8/2/2019]      Simvastatin Muscle Pain (Myalgia)     severe  Other reaction(s): Myalgia caused by statin     Methotrexate Other (See Comments)     Other reaction(s): Sore  Sores in mouth, esophagus, and stomach.       Morphine And Codeine Itching and Other (See Comments)     Psych disturbance  Other reaction(s): Confusion, Mood alteration    Quinolones Anxiety, Dizziness, Headache, Other (See Comments), Palpitations and Unknown     Other reaction(s): Hyperactive behavior, Lightheadedness, Mood alteration    Dizzy, light headed    Dizziness, shaky, and jumpy    Capsules, Empty Gelatin [Gelatin]     Carvedilol Diarrhea     Per pt - severe diarrhea and LE swelling  + tolerating Toprol    Lansoprazole Diarrhea    Strawberry Extract     Azithromycin Itching     [See intradermal skin testing results from 8/2/2019]    Bactrim [Sulfamethoxazole-Trimethoprim] Other (See Comments)     Numb mouth, tingling lips (treated with anti-histamines)    Cephalosporins Itching     [See intradermal skin testing results from 8/2/2019]    Ciprofloxacin Hcl Other (See Comments) and Dizziness     Insomnia, mood lability, Irregular heart beat         Doxycycline Itching and Unknown     [See intradermal skin testing results from 8/2/2019]    Lisinopril Cough    Omeprazole Itching    Tolectin [Nsaids] Rash    Tolmetin Rash and Itching    Tramadol Rash, Hives and Itching       MEDICATIONS:  Current Facility-Administered Medications   Medication Dose Route Frequency Provider Last Rate Last Admin    acetaminophen (TYLENOL) tablet 975 mg  975 mg Oral BID Jah Bettencourt PA-C   975 mg at 05/31/25 0759    albuterol (PROVENTIL) neb solution 2.5 mg  2.5 mg Nebulization Q4H PRN Jah Bettencourt PA-C        [Held by provider] amLODIPine (NORVASC) tablet 5 mg  5 mg Oral Daily Jah Bettencourt PA-C        [Held by provider] apixaban ANTICOAGULANT (ELIQUIS) tablet 2.5 mg  2.5 mg Oral BID Jah Bettencourt PA-C        calcitRIOL (ROCALTROL) capsule 0.25 mcg  0.25 mcg Oral Daily Jah Bettencourt PA-C   0.25 mcg at 05/31/25 0759    calcium carbonate (TUMS)  chewable tablet 1,000 mg  1,000 mg Oral Q4H PRN Jah Bettencourt PA-C        glucose gel 15-30 g  15-30 g Oral Q15 Min PRN Jah Bettencourt PA-C        Or    dextrose 50 % injection 25-50 mL  25-50 mL Intravenous Q15 Min PRN Jah Bettencourt PA-C        Or    glucagon injection 1 mg  1 mg Subcutaneous Q15 Min PRN Jah Bettencourt PA-C        diclofenac (VOLTAREN) 1 % topical gel 4 g  4 g Topical 4x Daily Jah Bettencourt PA-C   4 g at 05/31/25 0800    diphenhydrAMINE (BENADRYL) elixir 25 mg  25 mg Oral or Feeding Tube Q6H PRN Aleja Antunez MD   25 mg at 05/30/25 1041    ertapenem (INVanz) 500 mg in sodium chloride 0.9 % 50 mL intermittent infusion  500 mg Intravenous Q24H Samson Estes  mL/hr at 05/30/25 1843 500 mg at 05/30/25 1843    estradiol (ESTRACE) cream 2 g  2 g Vaginal Once per day on Monday Wednesday Friday Jah Bettencourt PA-C   2 g at 05/30/25 2132    ezetimibe (ZETIA) tablet 10 mg  10 mg Oral At Bedtime Jah Bettencourt PA-C   10 mg at 05/30/25 2112    ferrous sulfate (FEROSUL) tablet 325 mg  325 mg Oral At Bedtime Jah Bettencourt PA-C        folic acid (FOLVITE) tablet 1,000 mcg  1,000 mcg Oral At Bedtime Jah Bettencourt PA-C   1,000 mcg at 05/30/25 2112    gabapentin (NEURONTIN) solution 300 mg  300 mg Oral At Bedtime Jah Bettencourt PA-C   300 mg at 05/30/25 2133    heparin ANTICOAGULANT injection 5,000 Units  5,000 Units Subcutaneous Q8H Josefina Perea DO   5,000 Units at 05/31/25 0356    [Held by provider] hydrALAZINE (APRESOLINE) tablet 50 mg  50 mg Oral TID Jah Bettencourt PA-C        hydrocortisone sodium succinate PF (solu-CORTEF) injection 100 mg  100 mg Intravenous Q6H Torsten Diaz MD   100 mg at 05/31/25 0356    HYDROmorphone (DILAUDID) injection 0.1 mg  0.1 mg Intravenous Q1H PRN Josefina Perea DO        insulin aspart (NovoLOG) injection (RAPID ACTING)  1-3 Units Subcutaneous TID AC Jah Bettencourt, MIKE   1 Units at 05/31/25 0758    insulin aspart (NovoLOG)  injection (RAPID ACTING)  1-3 Units Subcutaneous At Bedtime Jah Bettencourt PA-C        levothyroxine (SYNTHROID/LEVOTHROID) tablet 175 mcg  175 mcg Oral QAM AC Jah Bettencourt PA-C   175 mcg at 05/31/25 0757    Lidocaine (LIDOCARE) 4 % Patch 2 patch  2 patch Transdermal Q24H DataJosefina mackey DO   2 patch at 05/30/25 0808    lidocaine (LMX4) cream   Topical Q1H PRN Jah Bettencourt PA-C        lidocaine 1 % 0.1-1 mL  0.1-1 mL Other Q1H PRN Jah Bettencourt PA-C        melatonin tablet 1 mg  1 mg Oral At Bedtime DataOlinda mackeya DO        methocarbamol (ROBAXIN) tablet 500 mg  500 mg Oral 4x Daily DataOlinda mackeya, DO   500 mg at 05/31/25 0759    metoprolol tartrate (LOPRESSOR) half-tab 12.5 mg  12.5 mg Oral BID DataOlinda mackeya, DO   12.5 mg at 05/31/25 0757    multivitamin, therapeutic (THERA-VIT) tablet 1 tablet  1 tablet Oral Daily Datakey Josefina, DO   1 tablet at 05/31/25 0800    naloxone (NARCAN) injection 0.2 mg  0.2 mg Intravenous Q2 Min PRN Aleja Antunez MD        Or    naloxone (NARCAN) injection 0.4 mg  0.4 mg Intravenous Q2 Min PRN Aleja Antunez MD        Or    naloxone (NARCAN) injection 0.2 mg  0.2 mg Intramuscular Q2 Min PRN Aleja Antunez MD        Or    naloxone (NARCAN) injection 0.4 mg  0.4 mg Intramuscular Q2 Min PRN Aleja Antunez MD        norepinephrine (LEVOPHED) 4 mg in  mL PERIPHERAL infusion  0.01-0.2 mcg/kg/min Intravenous Continuous Datakey Josefina, DO   Stopped at 05/31/25 0350    Nutrisource Fiber PO packet 1 each  1 packet Oral Daily Jah Bettencourt PA-C        nystatin (MYCOSTATIN) suspension 1,000,000 Units  1,000,000 Units Oral 4x Daily Jah Bettencourt PA-C   1,000,000 Units at 05/31/25 0759    ondansetron (ZOFRAN) injection 4 mg  4 mg Intravenous Q6H PRN Jah Bettencourt, PA-C        oxyCODONE (ROXICODONE) tablet 5 mg  5 mg Oral Q3H PRN Josefina Perea DO        [Held by provider] predniSONE (DELTASONE) half-tab 9 mg  9 mg Oral Daily Luzma Osman, APRN CNP        sertraline  (ZOLOFT) tablet 50 mg  50 mg Oral Daily Jah Bettencourt PA-C   50 mg at 05/31/25 0800    sodium chloride (PF) 0.9% PF flush 3 mL  3 mL Intracatheter Q8H LUZMARIA Jah Bettencourt PA-C   3 mL at 05/30/25 2134    sodium chloride (PF) 0.9% PF flush 3 mL  3 mL Intracatheter q1 min prn Jah Bettencourt PA-C        [Held by provider] tacrolimus (GENERIC EQUIVALENT) capsule 0.5 mg  0.5 mg Oral BID IS Luzma Osman APRN CNP        ursodiol (ACTIGALL) tablet 250 mg  250 mg Oral BID Jah Bettencourt PA-C   250 mg at 05/31/25 0758    vancomycin place horvath - receiving intermittent dosing  1 each Intravenous See Admin Instructions Jah Bettencourt PA-C           PHYSICAL EXAMINATION:  Temp:  [97.6  F (36.4  C)-98.2  F (36.8  C)] 97.6  F (36.4  C)  Pulse:  [50-96] 61  Resp:  [6-22] 12  BP: ()/(41-68) 141/68  MAP:  [59 mmHg-102 mmHg] 100 mmHg  Arterial Line BP: ()/(37-68) 170/50  SpO2:  [86 %-100 %] 94 %  General: appropriately  HEENT: WNL  Neck:WNL   Neuro: A&Ox3, NAD  Pulm/Resp: Clear breath sounds bilaterally   CV: RRR  Abdomen: Soft, mildly distended, tender diffuse. PATY drain  Ostomy: moist/pink, viable. No output.  :  external catheter in place, urine yellow and clear  Incisions/Skin: CDI  MSK/Extremities: no peripheral edema, moving all extremities, peripheral pulses intact, extremities well perfused.    LABS: Reviewed.   Arterial Blood Gases   Recent Labs   Lab 05/29/25  1051   PH 7.32*   PCO2 35   PO2 136*   HCO3 18*     Complete Blood Count   Recent Labs   Lab 05/31/25  0649 05/30/25  0412 05/29/25  1515 05/29/25  1051 05/29/25  0546   WBC 19.4* 20.2* 20.2*  --  9.1   HGB 7.8* 8.6* 8.5* 8.5* 7.6*    207 215  --  200     Basic Metabolic Panel  Recent Labs   Lab 05/31/25  0752 05/31/25  0649 05/31/25  0353 05/30/25  2115 05/30/25  0836 05/30/25  0412 05/29/25  1643 05/29/25  1515 05/29/25  1317 05/29/25  1051 05/28/25  2302 05/28/25  2153   NA  --  141  --   --   --  141  --  140  --  139  --   138   POTASSIUM  --  5.0  --   --   --  4.3  --  4.7  --  4.5  --  4.3   CHLORIDE  --  110*  --   --   --  111*  --  110*  --   --   --  101   CO2  --  19*  --   --   --  17*  --  18*  --   --   --  21*   BUN  --  80.2*  --   --   --  78.8*  --  85.2*  --   --   --  107.1*   CR  --  2.39*  --   --   --  2.21*  --  2.37*  --   --   --  2.67*   * 177* 191* 165*   < > 161*   < > 174*   < > 191*   < > 244*    < > = values in this interval not displayed.     Liver Function Tests  Recent Labs   Lab 05/29/25  1515 05/29/25  0546 05/28/25  1343 05/27/25  1136   AST 68*  --  23 26   ALT 53*  --  27 28   ALKPHOS 196*  --  160* 178*   BILITOTAL 0.2  --  0.4 0.4   ALBUMIN 2.4*  --  3.2* 3.6   INR  --  1.49*  --   --      Pancreatic Enzymes  No lab results found in last 7 days.  Coagulation Profile  Recent Labs   Lab 05/29/25  0546   INR 1.49*   PTT 35       IMAGING:  No results found for this or any previous visit (from the past 24 hours).

## 2025-05-31 NOTE — PLAN OF CARE
Goal Outcome Evaluation:    Overall Patient Progress: no change    Plan of Care Reviewed With: patient    Outcome Evaluation: see note below     ICU End of Shift Summary. See flowsheets for vital signs and detailed assessment.    Changes this shift: A&O x 4, follows commands. Denies nausea and SOB. Pain managed with scheduled meds. Remains on 1L NC while sleeping. Levo restarted intermittently throughout the shift to meet MAP/SBP goals; HR zhou 49-50s. No BM this shift. 100 mL UO utilizing external catheter. PATY output around 15mL/hr.     Plan: Trend labs and vitals.

## 2025-05-31 NOTE — PLAN OF CARE
ICU End of Shift Summary. See flowsheets for vital signs and detailed assessment.    Changes this shift: advanced to full liquid diet and tolerating well. Managing pain w/ only oral oxycodone this shift. Midline incision now TATO per MD. No lightheadedness w/ standing. Up to chair  pivot w/ 2. BC drawn. Pt on IV vanco and merrem. Pt needing 1L NC when sleeping.    Plan:  manage pain. Monitor BP          Goal Outcome Evaluation:      Plan of Care Reviewed With: patient    Overall Patient Progress: improvingOverall Patient Progress: improving    Outcome Evaluation: no low BP events. maintains in Sinus bradycardia w/ HR ranging low 50-low 60s.

## 2025-05-31 NOTE — PROGRESS NOTES
05/31/25 1000   Appointment Info   Signing Clinician's Name / Credentials (PT) erinn lloyd, pt   Living Environment   People in Home alone   Current Living Arrangements independent living facility   Home Accessibility no concerns   Transportation Anticipated family or friend will provide   Living Environment Comments Pt from [facility], has been in TCU for the last month. Has walk-in shower, shower chair, and grab bars.   Self-Care   Usual Activity Tolerance moderate   Current Activity Tolerance fair   Regular Exercise No   Equipment Currently Used at Home walker, rolling;grab bar, tub/shower;grab bar, toilet;shower chair  (4WW)   Fall history within last six months yes   Number of times patient has fallen within last six months 2   Activity/Exercise/Self-Care Comment mod IND ambulator with 4WW for household and community distances.   General Information   Onset of Illness/Injury or Date of Surgery 05/28/25   Referring Physician Jah Bettencourt PA-C   Patient/Family Therapy Goals Statement (PT) return to [facility]   Pertinent History of Current Problem (include personal factors and/or comorbidities that impact the POC) Mrs. Thompson is a 75 year old female with history of liver transplant admitted for smoldering diverticulitis with 4.1cm abscess s/p open sigmoidectomy and end colostomy on 5/30/25, recovering appropriately.   Existing Precautions/Restrictions abdominal   Cognition   Orientation Status (Cognition) oriented x 3   Follows Commands (Cognition) WFL   Pain Assessment   Patient Currently in Pain Yes, see Vital Sign flowsheet   Posture    Posture Forward head position;Protracted shoulders   Range of Motion (ROM)   ROM Comment B LE grossly WFL   Strength (Manual Muscle Testing)   Strength Comments B LE grossly at least 4/5   Bed Mobility   Comment, (Bed Mobility) supine > sit mod assist   Transfers   Comment, (Transfers) sit > stand with B UE support and min assist   Gait/Stairs (Locomotion)   Comment, (Gait/Stairs) a  few steps in room with B UE support and min assist   Balance   Balance Comments required B UE support and min assist to maintain standing balance   Sensory Examination   Sensory Perception Comments light touch/proprioception intact B LE   Muscle Tone   Muscle Tone no deficits were identified   Clinical Impression   Criteria for Skilled Therapeutic Intervention Yes, treatment indicated   PT Diagnosis (PT) impaired functional mobility s/p abdominal surgery   Influenced by the following impairments pain, impaired balance, decreased strength, decreased activity tolerance   Functional limitations due to impairments impaired bed mobility, impaired transfers, impaired gait   Clinical Presentation (PT Evaluation Complexity) stable   Clinical Presentation Rationale clinical judgement   Clinical Decision Making (Complexity) low complexity   Planned Therapy Interventions (PT) balance training;bed mobility training;gait training;transfer training;progressive activity/exercise   Risk & Benefits of therapy have been explained evaluation/treatment results reviewed;care plan/treatment goals reviewed   PT Total Evaluation Time   PT Cuco Low Complexity Minutes (59874) 7   Physical Therapy Goals   PT Frequency 5x/week   PT Predicted Duration/Target Date for Goal Attainment 06/11/25   PT Goals Bed Mobility;Transfers;Gait   PT: Bed Mobility Independent;Supine to/from sit;Within precautions   PT: Transfers Modified independent;Sit to/from stand;Within precautions;Assistive device  (4WW)   PT: Gait Modified independent;Rolling walker;150 feet   PT Discharge Planning   PT Plan standing tolerance; try sit > stand with 4WW; bed > chair   PT Discharge Recommendation (DC Rec) Transitional Care Facility   PT Rationale for DC Rec dependence with functional mobility; unable to ambulate functional distance   PT Brief overview of current status supine > sit mod assist; sit > stand and bed >chair with min assist x 2   Physical Therapy Time and  Intention   Total Session Time (sum of timed and untimed services) 7

## 2025-05-31 NOTE — PHARMACY-VANCOMYCIN DOSING SERVICE
Pharmacy Vancomycin Note  Date of Service May 31, 2025  Patient's  1950   75 year old, female    Indication: Intra-abdominal infection  Day of Therapy: 3  Current vancomycin regimen:  Intermittent dosing  Current vancomycin monitoring method: Trough (Method 2 = manual dose calculation)  Current vancomycin therapeutic monitoring goal: 15-20 mg/L    Current estimated CrCl = Estimated Creatinine Clearance: 22.1 mL/min (A) (based on SCr of 2.39 mg/dL (H)).    Creatinine for last 3 days  2025:  1:43 PM Creatinine 2.99 mg/dL;  9:53 PM Creatinine 2.67 mg/dL  2025:  3:15 PM Creatinine 2.37 mg/dL  2025:  4:12 AM Creatinine 2.21 mg/dL  2025:  6:49 AM Creatinine 2.39 mg/dL    Recent Vancomycin Levels (past 3 days)  2025:  4:12 AM Vancomycin 5.4 ug/mL  2025:  6:52 AM Vancomycin 9.8 ug/mL    Vancomycin IV Administrations (past 72 hours)                     vancomycin (VANCOCIN) 750 mg in sodium chloride 0.9 % 282.5 mL intermittent infusion (mg) 750 mg New Bag 25 0810    vancomycin (VANCOCIN) 750 mg in sodium chloride 0.9 % 282.5 mL intermittent infusion (mg) 750 mg New Bag 25 0155                    Nephrotoxins and other renal medications (From now, onward)      Start     Dose/Rate Route Frequency Ordered Stop    25 1200  vancomycin (VANCOCIN) 750 mg in sodium chloride 0.9 % 282.5 mL intermittent infusion         750 mg  over 90 Minutes Intravenous EVERY 24 HOURS 25 1119 25 1159    25 1730  norepinephrine (LEVOPHED) 4 mg in  mL PERIPHERAL infusion         0.01-0.2 mcg/kg/min × 81.9 kg  3.1-61.4 mL/hr  Intravenous CONTINUOUS 25 1706      25 1800  [Held by provider]  tacrolimus (GENERIC EQUIVALENT) capsule 0.5 mg        (On hold since 2025 at 2131 until manually unheld; held by Trisha Bradley MDHold Reason: Abnormal Labs)    0.5 mg Oral 2 TIMES DAILY. 25 1448                 Contrast Orders - past 72 hours (72h ago, onward)       None            Interpretation of levels and current regimen:  Vancomycin level is reflective of subtherapeutic level    Has serum creatinine changed greater than 50% in last 72 hours: No but SCr remains above baseline  Urine output:  diminished urine output  Renal Function: Stable    InsightRX Prediction of Planned New Vancomycin Regimen  Regimen: 750 mg IV every 24 hours.  Start time: 12:00 on 05/31/2025  Exposure target: AUC24 (range) 400-600 mg/L.hr   AUC24,ss: 562 mg/L.hr  Probability of AUC24 > 400: 97 %  Ctrough,ss: 19.6 mg/L  Probability of Ctrough,ss > 20: 47 %  Probability of nephrotoxicity (Lodise VERA 2009): 17 %    Plan:  Switch to scheduled dosing.  Give vancomycin 750 mg IV q24h  Vancomycin monitoring method: AUC  Vancomycin therapeutic monitoring goal: 400-600 mg*h/L  Pharmacy will check vancomycin levels as appropriate in 1-3 Days.  Serum creatinine levels will be ordered daily for the first week of therapy and at least twice weekly for subsequent weeks.      Heydi Witt, PharmD, BCPS

## 2025-05-31 NOTE — PROGRESS NOTES
[facility]    Progress Note - Colorectal Surgery Service       Date of Admission:  5/28/2025    Assessment & Plan: Surgery   Mrs. Thompson is a 75 year old female with history of liver transplant admitted for smoldering diverticulitis with 4.1cm abscess s/p open sigmoidectomy and end colostomy on 5/30/25, recovering appropriately.     - Ok to advance to full liquid diet from colorectal surgery perspective   - Continue SQheparin for DVT ppx, continue to hold eliquis for now  - Continue ertapenem per ID recs   - Multimodal pain regimen  - Hx of liver transplant - immunosuppression per transplant immunology team   - PTA levothyroxine   - Continue WOC ostomy cares   - Ron removed POD0, continue PATY drain   - Encourage out of bed and ambulation, incentive spirometer use      Diet: Clear Liquid Diet  Snacks/Supplements Adult: Other; PRN per pt request; With Meals  Snacks/Supplements Adult: Other; Offer pt Gelatein Plus or 20 TID with meals - please ask what flavor she prefers with each meal; With Meals    DVT Prophylaxis: mechanical and SQheparin  Ron Catheter: Not present  Lines: PRESENT      Arterial Line 05/29/25 Brachial-Site Assessment: WDL      Drains: PRESENT         - 1 Closed/Suction Drain(s)    - 1 Ureteral Drain/Stent(s)   Cardiac Monitoring: None  Code Status: Full Code      Clinically Significant Risk Factors          # Hyperchloremia: Highest Cl = 111 mmol/L in last 2 days, will monitor as appropriate      # Hypocalcemia: Lowest iCa = 3.9 mg/dL in last 2 days, will monitor and replace as appropriate     # Hypoalbuminemia: Lowest albumin = 2.4 g/dL at 5/29/2025  3:15 PM, will monitor as appropriate    # Coagulation Defect: INR = 1.49 (Ref range: 0.85 - 1.15) and/or PTT = 35 Seconds (Ref range: 22 - 38 Seconds), will monitor for bleeding   # Acute Kidney Injury, unspecified: based on a >150% or 0.3 mg/dL increase in last creatinine compared to past 90 day  "average, will monitor renal function  # Hypertension: Noted on problem list            # Overweight: Estimated body mass index is 28.45 kg/m  as calculated from the following:    Height as of this encounter: 1.702 m (5' 7\").    Weight as of this encounter: 82.4 kg (181 lb 10.5 oz)., PRESENT ON ADMISSION       # Financial/Environmental Concerns: none         Social Drivers of Health   Tobacco Use: Medium Risk (5/27/2025)    Patient History     Smoking Tobacco Use: Former     Smokeless Tobacco Use: Never   Physical Activity: Insufficiently Active (11/9/2023)    Received from [facility]    Exercise Vital Sign     Days of Exercise per Week: 5 days     Minutes of Exercise per Session: 10 min         Disposition Plan     Pending tolerance of advancement of diet and return of bowel function      The patient was seen with colorectal surgeon, Dr. Campbell.     Katharina Soriano MD  General Surgery [address]  Non-urgent messages: Securely message with [facility] (more info)  Text page via [employer] Paging/Directory     ______________________________________________________________________    Interval History   Overnight MAPs were low at 52-56 and she reported dizziness, she was started on 0.01-0.03 of norepinephrine and a 500ml bolus was initiated with improvement of MAP >65, off of pressors in AM.     She says her pain is well managed, no nausea/vomiting. Voiding, has not ambulated since surgery. No ostomy output.     Physical Exam   Vital Signs: Temp: 97.9  F (36.6  C) Temp src: Axillary BP: (!) 65/45 Pulse: 58   Resp: 11 SpO2: 100 % O2 Device: Nasal cannula Oxygen Delivery: 1 LPM  Weight: 181 lbs 10.54 oz  Intake/Output Summary (Last 24 hours) at 5/30/2025 0706  Last data filed at 5/30/2025 0700  Gross per 24 hour   Intake 2990.79 ml   Output 1360 ml   Net 1630.79 ml     General Appearance: Alert and oriented, no acute distress  Respiratory: nonlabored breathing on room air "   Cardiovascular: regular rate   GI: soft, nondistended, appropriately tender to palpation, ostomy pink and well perfused with no output in bag, incision clean/dry/intact, PATY serosanguinous output 290ml/24 hrs   Skin: no rashes    Data     I have personally reviewed the following data over the past 24 hrs:    19.4 (H)  \   7.8 (L)   / 194     N/A N/A N/A /  191 (H)   N/A N/A N/A \       Imaging results reviewed over the past 24 hrs:   No results found for this or any previous visit (from the past 24 hours).

## 2025-06-01 LAB
ALBUMIN SERPL BCG-MCNC: 2.5 G/DL (ref 3.5–5.2)
ALP SERPL-CCNC: 137 U/L (ref 40–150)
ALT SERPL W P-5'-P-CCNC: 30 U/L (ref 0–50)
ANION GAP SERPL CALCULATED.3IONS-SCNC: 11 MMOL/L (ref 7–15)
AST SERPL W P-5'-P-CCNC: 17 U/L (ref 0–45)
ATRIAL RATE - MUSE: NORMAL BPM
BACTERIA UR CULT: ABNORMAL
BILIRUB SERPL-MCNC: 0.2 MG/DL
BILIRUBIN DIRECT (ROCHE PRO & PURE): <0.08 MG/DL (ref 0–0.45)
BUN SERPL-MCNC: 76 MG/DL (ref 8–23)
CALCIUM SERPL-MCNC: 7.7 MG/DL (ref 8.8–10.4)
CHLORIDE SERPL-SCNC: 108 MMOL/L (ref 98–107)
CREAT SERPL-MCNC: 2.32 MG/DL (ref 0.51–0.95)
DIASTOLIC BLOOD PRESSURE - MUSE: NORMAL MMHG
EGFRCR SERPLBLD CKD-EPI 2021: 21 ML/MIN/1.73M2
ERYTHROCYTE [DISTWIDTH] IN BLOOD BY AUTOMATED COUNT: 18.5 % (ref 10–15)
GLUCOSE BLDC GLUCOMTR-MCNC: 173 MG/DL (ref 70–99)
GLUCOSE BLDC GLUCOMTR-MCNC: 179 MG/DL (ref 70–99)
GLUCOSE BLDC GLUCOMTR-MCNC: 183 MG/DL (ref 70–99)
GLUCOSE BLDC GLUCOMTR-MCNC: 202 MG/DL (ref 70–99)
GLUCOSE BLDC GLUCOMTR-MCNC: 214 MG/DL (ref 70–99)
GLUCOSE SERPL-MCNC: 194 MG/DL (ref 70–99)
HCO3 SERPL-SCNC: 19 MMOL/L (ref 22–29)
HCT VFR BLD AUTO: 23 % (ref 35–47)
HGB BLD-MCNC: 7.3 G/DL (ref 11.7–15.7)
INTERPRETATION ECG - MUSE: NORMAL
MAGNESIUM SERPL-MCNC: 2.3 MG/DL (ref 1.7–2.3)
MCH RBC QN AUTO: 27.5 PG (ref 26.5–33)
MCHC RBC AUTO-ENTMCNC: 31.7 G/DL (ref 31.5–36.5)
MCV RBC AUTO: 87 FL (ref 78–100)
P AXIS - MUSE: NORMAL DEGREES
PHOSPHATE SERPL-MCNC: 5.1 MG/DL (ref 2.5–4.5)
PLATELET # BLD AUTO: 177 10E3/UL (ref 150–450)
POTASSIUM SERPL-SCNC: 4.8 MMOL/L (ref 3.4–5.3)
PR INTERVAL - MUSE: NORMAL MS
PROT SERPL-MCNC: 5 G/DL (ref 6.4–8.3)
QRS DURATION - MUSE: 80 MS
QT - MUSE: 402 MS
QTC - MUSE: 323 MS
R AXIS - MUSE: 15 DEGREES
RBC # BLD AUTO: 2.65 10E6/UL (ref 3.8–5.2)
SODIUM SERPL-SCNC: 138 MMOL/L (ref 135–145)
SYSTOLIC BLOOD PRESSURE - MUSE: NORMAL MMHG
T AXIS - MUSE: 13 DEGREES
VANCOMYCIN SERPL-MCNC: 13.6 UG/ML (ref ?–25)
VENTRICULAR RATE- MUSE: 39 BPM
WBC # BLD AUTO: 14.6 10E3/UL (ref 4–11)

## 2025-06-01 PROCEDURE — 250N000013 HC RX MED GY IP 250 OP 250 PS 637

## 2025-06-01 PROCEDURE — 83735 ASSAY OF MAGNESIUM: CPT | Performed by: INTERNAL MEDICINE

## 2025-06-01 PROCEDURE — 99232 SBSQ HOSP IP/OBS MODERATE 35: CPT | Mod: 24 | Performed by: SURGERY

## 2025-06-01 PROCEDURE — 99233 SBSQ HOSP IP/OBS HIGH 50: CPT | Mod: GC | Performed by: INTERNAL MEDICINE

## 2025-06-01 PROCEDURE — 120N000002 HC R&B MED SURG/OB UMMC

## 2025-06-01 PROCEDURE — 250N000011 HC RX IP 250 OP 636

## 2025-06-01 PROCEDURE — 84100 ASSAY OF PHOSPHORUS: CPT | Performed by: INTERNAL MEDICINE

## 2025-06-01 PROCEDURE — 82248 BILIRUBIN DIRECT: CPT | Performed by: INTERNAL MEDICINE

## 2025-06-01 PROCEDURE — 85014 HEMATOCRIT: CPT | Performed by: PHYSICIAN ASSISTANT

## 2025-06-01 PROCEDURE — 250N000011 HC RX IP 250 OP 636: Mod: JZ | Performed by: NURSE PRACTITIONER

## 2025-06-01 PROCEDURE — 250N000013 HC RX MED GY IP 250 OP 250 PS 637: Performed by: SURGERY

## 2025-06-01 PROCEDURE — 99232 SBSQ HOSP IP/OBS MODERATE 35: CPT | Mod: 24 | Performed by: INTERNAL MEDICINE

## 2025-06-01 PROCEDURE — 80202 ASSAY OF VANCOMYCIN: CPT | Performed by: INTERNAL MEDICINE

## 2025-06-01 PROCEDURE — 250N000013 HC RX MED GY IP 250 OP 250 PS 637: Performed by: NURSE PRACTITIONER

## 2025-06-01 PROCEDURE — 258N000003 HC RX IP 258 OP 636: Performed by: NURSE PRACTITIONER

## 2025-06-01 PROCEDURE — 250N000013 HC RX MED GY IP 250 OP 250 PS 637: Performed by: INTERNAL MEDICINE

## 2025-06-01 PROCEDURE — 250N000013 HC RX MED GY IP 250 OP 250 PS 637: Performed by: PHYSICIAN ASSISTANT

## 2025-06-01 PROCEDURE — 80048 BASIC METABOLIC PNL TOTAL CA: CPT

## 2025-06-01 PROCEDURE — 250N000011 HC RX IP 250 OP 636: Performed by: INTERNAL MEDICINE

## 2025-06-01 PROCEDURE — 250N000012 HC RX MED GY IP 250 OP 636 PS 637

## 2025-06-01 PROCEDURE — 258N000003 HC RX IP 258 OP 636

## 2025-06-01 RX ORDER — LINEZOLID 600 MG/1
600 TABLET, FILM COATED ORAL EVERY 12 HOURS SCHEDULED
Status: DISCONTINUED | OUTPATIENT
Start: 2025-06-01 | End: 2025-06-11

## 2025-06-01 RX ORDER — HYDROCORTISONE SODIUM SUCCINATE 100 MG/2ML
75 INJECTION INTRAMUSCULAR; INTRAVENOUS EVERY 6 HOURS
Status: DISCONTINUED | OUTPATIENT
Start: 2025-06-01 | End: 2025-06-02

## 2025-06-01 RX ORDER — SENNOSIDES 8.6 MG
2 TABLET ORAL 2 TIMES DAILY
Status: DISCONTINUED | OUTPATIENT
Start: 2025-06-01 | End: 2025-06-10

## 2025-06-01 RX ORDER — SODIUM CHLORIDE, SODIUM LACTATE, POTASSIUM CHLORIDE, CALCIUM CHLORIDE 600; 310; 30; 20 MG/100ML; MG/100ML; MG/100ML; MG/100ML
INJECTION, SOLUTION INTRAVENOUS CONTINUOUS
Status: DISCONTINUED | OUTPATIENT
Start: 2025-06-01 | End: 2025-06-01

## 2025-06-01 RX ORDER — DEXTROSE MONOHYDRATE 25 G/50ML
25-50 INJECTION, SOLUTION INTRAVENOUS
Status: DISCONTINUED | OUTPATIENT
Start: 2025-06-01 | End: 2025-06-12 | Stop reason: HOSPADM

## 2025-06-01 RX ORDER — NICOTINE POLACRILEX 4 MG
15-30 LOZENGE BUCCAL
Status: DISCONTINUED | OUTPATIENT
Start: 2025-06-01 | End: 2025-06-12 | Stop reason: HOSPADM

## 2025-06-01 RX ORDER — OMEGA-3-ACID ETHYL ESTERS 1 G/1
1 CAPSULE, LIQUID FILLED ORAL 2 TIMES DAILY
Status: DISCONTINUED | OUTPATIENT
Start: 2025-06-01 | End: 2025-06-12 | Stop reason: HOSPADM

## 2025-06-01 RX ORDER — VANCOMYCIN HYDROCHLORIDE 1 G/200ML
1000 INJECTION, SOLUTION INTRAVENOUS
Status: DISCONTINUED | OUTPATIENT
Start: 2025-06-01 | End: 2025-06-01

## 2025-06-01 RX ADMIN — HYDROCORTISONE SODIUM SUCCINATE 100 MG: 100 INJECTION, POWDER, FOR SOLUTION INTRAMUSCULAR; INTRAVENOUS at 03:59

## 2025-06-01 RX ADMIN — HEPARIN SODIUM 5000 UNITS: 5000 INJECTION, SOLUTION INTRAVENOUS; SUBCUTANEOUS at 12:56

## 2025-06-01 RX ADMIN — DICLOFENAC SODIUM 4 G: 10 GEL TOPICAL at 07:46

## 2025-06-01 RX ADMIN — GABAPENTIN 300 MG: 250 SOLUTION ORAL at 22:18

## 2025-06-01 RX ADMIN — SERTRALINE HYDROCHLORIDE 50 MG: 50 TABLET ORAL at 07:41

## 2025-06-01 RX ADMIN — HEPARIN SODIUM 5000 UNITS: 5000 INJECTION, SOLUTION INTRAVENOUS; SUBCUTANEOUS at 20:07

## 2025-06-01 RX ADMIN — Medication 1000 MCG: at 22:18

## 2025-06-01 RX ADMIN — EZETIMIBE 10 MG: 10 TABLET ORAL at 22:18

## 2025-06-01 RX ADMIN — HYDROCORTISONE SODIUM SUCCINATE 75 MG: 100 INJECTION, POWDER, FOR SOLUTION INTRAMUSCULAR; INTRAVENOUS at 15:51

## 2025-06-01 RX ADMIN — METHOCARBAMOL 500 MG: 500 TABLET ORAL at 20:04

## 2025-06-01 RX ADMIN — THERA TABS 1 TABLET: TAB at 07:41

## 2025-06-01 RX ADMIN — MEROPENEM 500 MG: 500 INJECTION, POWDER, FOR SOLUTION INTRAVENOUS at 00:17

## 2025-06-01 RX ADMIN — MEROPENEM 500 MG: 500 INJECTION, POWDER, FOR SOLUTION INTRAVENOUS at 12:55

## 2025-06-01 RX ADMIN — ACETAMINOPHEN 975 MG: 325 TABLET, FILM COATED ORAL at 07:30

## 2025-06-01 RX ADMIN — NYSTATIN 1000000 UNITS: 100000 SUSPENSION ORAL at 15:51

## 2025-06-01 RX ADMIN — NYSTATIN 1000000 UNITS: 100000 SUSPENSION ORAL at 07:40

## 2025-06-01 RX ADMIN — ACETAMINOPHEN 975 MG: 325 TABLET, FILM COATED ORAL at 20:04

## 2025-06-01 RX ADMIN — LIDOCAINE 2 PATCH: 4 PATCH TOPICAL at 07:40

## 2025-06-01 RX ADMIN — HYDROCORTISONE SODIUM SUCCINATE 75 MG: 100 INJECTION, POWDER, FOR SOLUTION INTRAMUSCULAR; INTRAVENOUS at 22:18

## 2025-06-01 RX ADMIN — HYDROCORTISONE SODIUM SUCCINATE 100 MG: 100 INJECTION, POWDER, FOR SOLUTION INTRAMUSCULAR; INTRAVENOUS at 10:51

## 2025-06-01 RX ADMIN — HEPARIN SODIUM 5000 UNITS: 5000 INJECTION, SOLUTION INTRAVENOUS; SUBCUTANEOUS at 03:59

## 2025-06-01 RX ADMIN — Medication 1 EACH: at 10:51

## 2025-06-01 RX ADMIN — METHOCARBAMOL 500 MG: 500 TABLET ORAL at 12:56

## 2025-06-01 RX ADMIN — DICLOFENAC SODIUM 4 G: 10 GEL TOPICAL at 15:52

## 2025-06-01 RX ADMIN — METHOCARBAMOL 500 MG: 500 TABLET ORAL at 07:38

## 2025-06-01 RX ADMIN — NYSTATIN 1000000 UNITS: 100000 SUSPENSION ORAL at 12:56

## 2025-06-01 RX ADMIN — AMLODIPINE BESYLATE 5 MG: 5 TABLET ORAL at 07:41

## 2025-06-01 RX ADMIN — DIPHENHYDRAMINE HYDROCHLORIDE 25 MG: 25 SOLUTION ORAL at 22:27

## 2025-06-01 RX ADMIN — OXYCODONE HYDROCHLORIDE 5 MG: 5 TABLET ORAL at 15:49

## 2025-06-01 RX ADMIN — TACROLIMUS 0.5 MG: 5 CAPSULE ORAL at 20:07

## 2025-06-01 RX ADMIN — SODIUM CHLORIDE, SODIUM LACTATE, POTASSIUM CHLORIDE, AND CALCIUM CHLORIDE 500 ML: .6; .31; .03; .02 INJECTION, SOLUTION INTRAVENOUS at 07:52

## 2025-06-01 RX ADMIN — CALCITRIOL CAPSULES 0.25 MCG 0.25 MCG: 0.25 CAPSULE ORAL at 07:41

## 2025-06-01 RX ADMIN — SENNOSIDES 2 TABLET: 8.6 TABLET, FILM COATED ORAL at 12:56

## 2025-06-01 RX ADMIN — OMEGA-3-ACID ETHYL ESTERS 1 G: 1 CAPSULE, LIQUID FILLED ORAL at 14:46

## 2025-06-01 RX ADMIN — SENNOSIDES 2 TABLET: 8.6 TABLET, FILM COATED ORAL at 20:03

## 2025-06-01 RX ADMIN — NYSTATIN 1000000 UNITS: 100000 SUSPENSION ORAL at 20:04

## 2025-06-01 RX ADMIN — METHOCARBAMOL 500 MG: 500 TABLET ORAL at 15:51

## 2025-06-01 RX ADMIN — OXYCODONE HYDROCHLORIDE 5 MG: 5 TABLET ORAL at 07:29

## 2025-06-01 RX ADMIN — LEVOTHYROXINE SODIUM 175 MCG: 0.17 TABLET ORAL at 07:40

## 2025-06-01 RX ADMIN — Medication 20000 UNITS: at 07:46

## 2025-06-01 RX ADMIN — SODIUM CHLORIDE, SODIUM LACTATE, POTASSIUM CHLORIDE, AND CALCIUM CHLORIDE: .6; .31; .03; .02 INJECTION, SOLUTION INTRAVENOUS at 10:56

## 2025-06-01 RX ADMIN — URSODIOL 250 MG: 250 TABLET ORAL at 20:04

## 2025-06-01 RX ADMIN — URSODIOL 250 MG: 250 TABLET ORAL at 07:40

## 2025-06-01 ASSESSMENT — ACTIVITIES OF DAILY LIVING (ADL)
ADLS_ACUITY_SCORE: 59

## 2025-06-01 NOTE — PROGRESS NOTES
SURGICAL ICU NOTE  06/01/2025      Date of Service (when I saw the patient): 06/01/2025    ASSESSMENT:  Luz Thompson is a 75 year old female who was admitted on 5/28/2025 with past medical history of atrial fibrillation, DVT on eliquis, CVA, CKD III, HFpEF (TTE 3/9/25 EF 60-65%), T2DM,  biliary cirrhosis s/p liver transplant in 2002, EBV, HTN, HLD, Sjogren's syndrome, Basal Cell Carincoma s/p MOHS on 4/8/25, thyroid cancer s/p thyroidectomy in 2010, Sjogren's syndrome, and diverticulitis with recent admission from 4/14-4/23 for ESBL UTI & E. Faecalis bacteremia related to sigmoid microperforation (treated with IV ertapenem) who now presents for 1-day history of lightheadedness, hypotension, worsening non-bloody diarrhea, and abdominal pain that started on 5/28.     Seen in colorectal surgery clinic (5/27) where it was decided that she should undergo open sigmoidectomy with diverting loop ileostomy. She presented in the ED for abdominal pain, hypotension per EMS and CRS recommending surgical intervention.    5/29/2025:  - CRS: Dr. Campbell: Exploratory laparotomy, sigmoidectomy with end colostomy.  Cystoscopy with bilateral stent placement with urology team.  5/30:   - hypotensive, 500ml LR bolus minimal response, Levophed gtt restarted. Decreased opiates doses        Home medications  Tylenol 500 twice daily  Amlodipine 5 mg daily  Apixaban 2.5 mg twice daily  Calcitriol 0.25 mcg daily  D-mannose p.o. twice daily  Evolocumab 140 mg subcu every 14 days  Ezetimibe 10 mg daily  Ferrous sulfate 325 daily  Folic acid 1 mg daily  Gabapentin 20 mg nightly  Hydralazine 50 mg 3 times daily  Metoprolol 25mg XL daily      PLAN:  - goal SBP >90 or MAP >60    - holding metoprolol   - resumed PTA amlodipine 5 mg BID    - increased sliding scale insulin to medium    - 500 ml LR bolus for low UOP    - discontinued dilaudid   - add senna  - decrease steroid dose - tapering  - remove arterial line  - transfer to  floor    Neurological:  # Acute post op pain  # high risk delirium   - delirium precautions   - Monitor neurological status. Delirium preventions and precautions.   - Pain:  Robaxin QID, tylenol 975mg BID, gabapentin 300mg QHS, Lidocaine patches Daily, Diclofenac QID  -  PRN: PO oxycodone 5mg q3h prn, IV dilaudid 0.1mg discontinued   - Sedation plan: NONE  - PTA sertraline  - melatonin Q HS  - PRN narcan      Pulmonary:   # Post operative ventilatory support   # Acute hypoxic respiratory support   - Supplemental oxygen to keep saturation above 92 %.  - Incentive spirometer every 15- 30 minutes.   - PRN albuterol   - Between Room air to 1L NC, wean as able      Cardiovascular:    # Paroxysmal A-fib on apixaban   # Hypertension  # HFpEF (TTE 3/9/25 EF 60-65%)   # history of CVA  # Vasoplagia  - Monitor hemodynamic status.  - Levophed off since 0400 on 5/31    - MAP goal >65  - PTA ezetimibe   - HOLD PTA apixaban 2.5mg BID, timing to resume per CRS  - 5/30 SQH  - HOLD  metoprolol tartrate 12.5 mg BID   - 6/1 resumed PTA amlodipine 5 mg BID    - SBP >100 or MAP >65    Gastroenterology/Nutrition:  # primary biliary cirrhosis  # Status post liver transplant 2002  # Diverticulitis  # Status post exploratory laparotomy with Sigmoidectomy with end colostomy  # Protein calorie deficit malnutrition    - RD consult. Appreciate cares and recommendations.   - FLD   - PTA Nystatin QID  - PTA Ursodiol   - Supplement: Nutrisource   - Calorie counts  - PRN Tums, zofran  - Senna added 6/1  Hepatology Recs 5/30:  - Ok to hold prednisone 9 mg while patient is on IV hydrocortisone, would resume if she comes off  - Management of antibiotics per ID   - Await tacrolimus level, continue to hold tacrolimus  - Discontinue sirolimus   - Continue ursodiol   - Hepatology will continue to follow  CRS recs: 5/30  - Ok for DVT ppx from CRS perspective, continue to hold eliquis for now  - Continue ertapenem per ID recs   - Multimodal pain regimen  -  Hx of liver transplant - immunosuppression per transplant immunology team   - PTA levothyroxine   - Continue WOC ostomy cares   - Ron removed POD0, continue PATY drain   - Encourage out of bed and ambulation, incentive spirometer use      Fluids/Electrolytes/Renal:   # Chronic kidney disease III  # Acute kidney injury   # Bilateral stent placement per urology 5/29  - electrolyte replacement protocol    - daily weights  - IVF @ 50  added    - Baseline creatinine 1.3-1.6  - Cr 2.39 (2.32)    - external catheter   - PTA estradiol vaginal  cream  - UOP low), 500 ml LR bolus on 6/1        Endocrine:  # Type 2 diabetes  # Stress hyperglycemia    - Sliding scale for glucose management, increased to medium intensity    - Goal to keep BG< 180 for optimal wound healing    - Glucose AC/HS  - hypoglycemic protocol  - hydrocortisone 100mg Q6H - taper plan as follows:   - 6/1: hydrocortisone 75 mg q6h   - 6/2: hydrocortisone 50 mg q6h   - 6/3: hydrocortisone 50 mg q12h   - 6/4: stop hydrocortisone. Start PTA prednisone.   # Thyroid cancer status post thyroidectomy 2010  - PTA calcitriol 0.25mcg  - PTA levothyroxine 175mcg      ID:  # Reactive Leukocytosis   # EBV  # Sjogren's syndrome  # ESBL UTI with E faecalis bacteremia April 14 to 23 2025  Transplant ID consulted 5/28  - pending recs  # immunosuppression   - appreciate hepatology  - HOLD tacrolimus    - hydrocortisone with taper plan as above  # immunoprophylaxis  # perioperative antibiotics:   - switched Ertapenem to 5/31 meropenem for pseudomonas coverage  - WBC 14.6 (from 19.4), improving    - daily CBC  - HOLD PTA prednisone 9mg daily    - 5/31 Repeat Blood cultures x2  - 5/31 Ertapenem to meropenem for pseudomonas coverage      Positive cultures:  - 5/28 blood culture - Klebsiella pneumoniae 2/2  - 5/28 blood culture - Pseudomonas 1/2       Heme:     # Acute blood loss anemia    # Anemia of critical illness  # coagulopathy due to cirrhosis and surgical blood loss    #  thrombocytopenia   # anemia of critical illness   - Platelets 177 (194), stable    - Hemoglobin 7.3 (7.8), stable    - Transfuse if hgb <7.0 or signs/symptoms of hypoperfusion. Monitor and trend.   - HOLD PTA apixaban 2.5mg BID    - PTA Ferous sulfate, folic acid  - started SQH on 5/30      Musculoskeletal:   # Weakness and deconditioning of critical illness   - Physical and occupational therapy consult   - up with assist, fall precaution       Skin:  # Basal cell carcinoma status post Mohs April 2025  # pruritus   - PRN bendryl PO      General Cares/Prophylaxis:    DVT Prophylaxis: SCDs. SQH  GI Prophylaxis: Not indicated  Restraints: Restraints for medical healing needed: NO    Lines/ tubes/ drains:  - Midline  - PATY drain right abdomen  - art line - remove    - PIV      Disposition:  -  Transfer to floor today.    Patient seen, findings and plan discussed with surgical ICU staff, Dr. Oneal.    - - - - - - - - - - - - - - - - - - - - - - - - - - - - - - - - - - - - - - - - - - - - - -   Interval History:  No acute events overnight. Patient is feeling well. Pain is controlled. No ostomy gas or output yet. Not feeling bloated. Tolerating full liquids but reports that she often forgets to eat/drink at baseline. Felt a bit weak and unsteady when getting up to chair yesterday. ROS otherwise negative.      REVIEW OF SYSTEMS: 10 point ROS neg other than the symptoms noted above in the HPI.     I/O:    I/O last 3 completed shifts:  In: 1050 [P.O.:360; I.V.:190; IV Piggyback:500]  Out: 680 [Urine:525; Drains:150; Stool:5]    PHYSICAL EXAMINATION:  Temp:  [97.9  F (36.6  C)-98.4  F (36.9  C)] 98.4  F (36.9  C)  Pulse:  [51-67] 62  Resp:  [6-16] 16  BP: (143-168)/(56-89) 143/56  MAP:  [71 mmHg-164 mmHg] 164 mmHg  Arterial Line BP: (114-173)/(47-73) 155/73  SpO2:  [83 %-100 %] 93 %  General: resting comfortably in bed, no acute distress  HEENT: normocephalic, atraumatic  Neuro: A&Ox3, NAD  Pulm/Resp: Clear breath sounds  bilaterally   CV: RRR, well perfused extremities  Abdomen: Soft, mildly distended, appropriately tender throughout. PATY drain with serosanguinous output.   Ostomy: moist/pink, viable. No output.  :  external catheter in place   Incisions/Skin: CDI  MSK/Extremities: no peripheral edema, moving all extremities, peripheral pulses intact, extremities well perfused.    LABS: Reviewed.   Arterial Blood Gases   Recent Labs   Lab 05/29/25  1051   PH 7.32*   PCO2 35   PO2 136*   HCO3 18*     Complete Blood Count   Recent Labs   Lab 06/01/25  0406 05/31/25  0649 05/30/25  0412 05/29/25  1515   WBC 14.6* 19.4* 20.2* 20.2*   HGB 7.3* 7.8* 8.6* 8.5*    194 207 215     Basic Metabolic Panel  Recent Labs   Lab 06/01/25  1238 06/01/25  0736 06/01/25  0406 05/31/25  0752 05/31/25  0649 05/30/25  0836 05/30/25  0412 05/29/25  1643 05/29/25  1515   NA  --   --  138  --  141  --  141  --  140   POTASSIUM  --   --  4.8  --  5.0  --  4.3  --  4.7   CHLORIDE  --   --  108*  --  110*  --  111*  --  110*   CO2  --   --  19*  --  19*  --  17*  --  18*   BUN  --   --  76.0*  --  80.2*  --  78.8*  --  85.2*   CR  --   --  2.32*  --  2.39*  --  2.21*  --  2.37*   * 173* 179*  194*   < > 177*   < > 161*   < > 174*    < > = values in this interval not displayed.     Liver Function Tests  Recent Labs   Lab 06/01/25  0406 05/29/25  1515 05/29/25  0546 05/28/25  1343 05/27/25  1136   AST 17 68*  --  23 26   ALT 30 53*  --  27 28   ALKPHOS 137 196*  --  160* 178*   BILITOTAL 0.2 0.2  --  0.4 0.4   ALBUMIN 2.5* 2.4*  --  3.2* 3.6   INR  --   --  1.49*  --   --      Pancreatic Enzymes  No lab results found in last 7 days.  Coagulation Profile  Recent Labs   Lab 05/29/25  0546   INR 1.49*   PTT 35       IMAGING:  No results found for this or any previous visit (from the past 24 hours).

## 2025-06-01 NOTE — PHARMACY-VANCOMYCIN DOSING SERVICE
Pharmacy Vancomycin Note  Date of Service 2025  Patient's  1950   75 year old, female    Indication: Intra-abdominal infection  Day of Therapy: 4  Current vancomycin regimen:  750 mg IV q24h  Current vancomycin monitoring method: AUC  Current vancomycin therapeutic monitoring goal: 400-600 mg*h/L    InsightRX Prediction of Current Vancomycin Regimen  Regimen: 750 mg IV every 24 hours.  Start time: 16:00 on 2025  Exposure target: AUC24 (range) 400-600 mg/L.hr   AUC24,ss: 590 mg/L.hr  Probability of AUC24 > 400: 99 %  Ctrough,ss: 20.7 mg/L  Probability of Ctrough,ss > 20: 57 %  Probability of nephrotoxicity (Lodise VERA ): 19 %      Current estimated CrCl = Estimated Creatinine Clearance: 22.8 mL/min (A) (based on SCr of 2.32 mg/dL (H)).    Creatinine for last 3 days  2025:  3:15 PM Creatinine 2.37 mg/dL  2025:  4:12 AM Creatinine 2.21 mg/dL  2025:  6:49 AM Creatinine 2.39 mg/dL  2025:  4:06 AM Creatinine 2.32 mg/dL    Recent Vancomycin Levels (past 3 days)  2025:  4:12 AM Vancomycin 5.4 ug/mL  2025:  6:52 AM Vancomycin 9.8 ug/mL  2025: 11:07 AM Vancomycin 13.6 ug/mL    Vancomycin IV Administrations (past 72 hours)                     vancomycin (VANCOCIN) 750 mg in sodium chloride 0.9 % 282.5 mL intermittent infusion (mg) 750 mg New Bag 25 1152    vancomycin (VANCOCIN) 750 mg in sodium chloride 0.9 % 282.5 mL intermittent infusion (mg) 750 mg New Bag 25 0810                    Nephrotoxins and other renal medications (From now, onward)      Start     Dose/Rate Route Frequency Ordered Stop    25 1600  vancomycin (VANCOCIN) 1,000 mg in 200 mL dextrose intermittent infusion         1,000 mg  200 mL/hr over 1 Hours Intravenous EVERY 36 HOURS 25 1333 25 1559    25 1800  tacrolimus (GENERIC EQUIVALENT) capsule 0.5 mg         0.5 mg Oral 2 TIMES DAILY. 25 1448                 Contrast Orders - past 72 hours (72h ago, onward)       None            Interpretation of levels and current regimen:  Vancomycin level is reflective of -600    Has serum creatinine changed greater than 50% in last 72 hours: No  Urine output:  diminished urine output  Renal Function: Stable but below baseline, potentially worsening given low UOP    InsightRX Prediction of Planned New Vancomycin Regimen  Regimen: 1000 mg IV every 36 hours.  Start time: 16:00 on 06/01/2025  Exposure target: AUC24 (range) 400-600 mg/L.hr   AUC24,ss: 527 mg/L.hr  Probability of AUC24 > 400: 97 %  Ctrough,ss: 17.1 mg/L  Probability of Ctrough,ss > 20: 24 %  Probability of nephrotoxicity (Lodise VERA 2009): 13 %    Plan:  Change dosing to 1000 mg IV q36 and keep the 6/3 stop date (2 remaining doses)  Vancomycin monitoring method: AUC  Vancomycin therapeutic monitoring goal: 400-600 mg*h/L  Pharmacy will check another vancomycin level if her renal function continues to decline or if the treatment course is extended  Serum creatinine levels will be ordered daily for the first week of therapy and at least twice weekly for subsequent weeks.      Heydi Witt, PharmD, BCPS

## 2025-06-01 NOTE — PLAN OF CARE
ICU End of Shift Summary. See flowsheets for vital signs and detailed assessment.    Changes this shift:  Some serosanguinous colostomy output noted. Pt still tolerating full liquid diet. Pt started on senna. Pt very weak with transfers- buckled knees upon afternoon attempt to pivot transfer from chair back to bed. This writer and another staff used lift device to get her back to bed from the chair. Pt anxious w/ physical transfers.    Plan:  tacro resumed. Solumedrol taper in place.  Monitor colostomy and PATY output. Transfer to [facility] [place] w/ no tele pending bed availability.        Problem: Adult Inpatient Plan of Care  Goal: Plan of Care Review  Description: The Plan of Care Review/Shift note should be completed every shift.  The Outcome Evaluation is a brief statement about your assessment that the patient is improving, declining, or no change.  This information will be displayed automatically on your shift  note.  Recent Flowsheet Documentation  Taken 6/1/2025 1529 by Linda Romero, RN  Outcome Evaluation: LR started @ 75ml/hr this AM. pt tolerating full liquid diet. 1L NC when pt sleeps at times, otherwise, on RA. [facility] w/ no tele transfer orders in place.  Plan of Care Reviewed With: patient  Overall Patient Progress: improving

## 2025-06-01 NOTE — PROGRESS NOTES
"[facility]    Progress Note - Colorectal Surgery Service       Date of Admission:  5/28/2025    Assessment & Plan: Surgery   Mrs. Thompson is a 75 year old female with history of liver transplant admitted for smoldering diverticulitis with 4.1cm abscess s/p open sigmoidectomy and end colostomy on 5/30/25, recovering appropriately, awaiting return of bowel function.    - Continue full liquid diet   - Continue SQheparin for DVT ppx, continue to hold eliquis for now  - Continue ertapenem per ID recs   - Multimodal pain regimen  - Hx of liver transplant - immunosuppression per transplant immunology team   - PTA levothyroxine   - Continue WOC ostomy cares   - Ron removed POD0, continue PATY drain   - Encourage out of bed and ambulation, incentive spirometer use      Diet: Snacks/Supplements Adult: Other; PRN per pt request; With Meals  Snacks/Supplements Adult: Other; Offer pt Gelatein Plus or 20 TID with meals - please ask what flavor she prefers with each meal; With Meals  Full Liquid Diet    DVT Prophylaxis: mechanical and SQheparin  Ron Catheter: Not present  Lines: PRESENT      Arterial Line 05/29/25 Brachial-Site Assessment: WDL      Drains: PRESENT         - 1 Closed/Suction Drain(s)    - 1 Ureteral Drain/Stent(s)   Cardiac Monitoring: None  Code Status: Full Code      Clinically Significant Risk Factors          # Hyperchloremia: Highest Cl = 110 mmol/L in last 2 days, will monitor as appropriate          # Hypoalbuminemia: Lowest albumin = 2.4 g/dL at 5/29/2025  3:15 PM, will monitor as appropriate      # Acute Kidney Injury, unspecified: based on a >150% or 0.3 mg/dL increase in last creatinine compared to past 90 day average, will monitor renal function  # Hypertension: Noted on problem list            # Overweight: Estimated body mass index is 28.45 kg/m  as calculated from the following:    Height as of this encounter: 1.702 m (5' 7\").    Weight as of this " encounter: 82.4 kg (181 lb 10.5 oz)., PRESENT ON ADMISSION       # Financial/Environmental Concerns: none         Social Drivers of Health   Tobacco Use: Medium Risk (5/27/2025)    Patient History     Smoking Tobacco Use: Former     Smokeless Tobacco Use: Never   Physical Activity: Insufficiently Active (11/9/2023)    Received from [facility]    Exercise Vital Sign     Days of Exercise per Week: 5 days     Minutes of Exercise per Session: 10 min         Disposition Plan     Pending tolerance of advancement of diet and return of bowel function      Patient was seen and discussed with colorectal fellow, Dr. Montano, who discussed with colorectal staff, Dr. Shannan Soriano MD  General Surgery PGY1  [facility]  Non-urgent messages: Securely message with [employer] (more info)  Text page via [employer] Paging/Directory     ______________________________________________________________________    Interval History   She remained off of pressors overnight. Tolerating full liquid diet without nausea/vomiting. She does not have any ostomy output of gas or stool yet. Voiding without difficulty. Has not been ambulating.     Physical Exam   Vital Signs: Temp: 98.3  F (36.8  C) Temp src: Oral   Pulse: 63   Resp: 16 SpO2: 97 % O2 Device: None (Room air) Oxygen Delivery: 1 LPM  Weight: 181 lbs 10.54 oz  Intake/Output Summary (Last 24 hours) at 5/30/2025 0706  Last data filed at 5/30/2025 0700  Gross per 24 hour   Intake 2990.79 ml   Output 1360 ml   Net 1630.79 ml     General Appearance: Alert and oriented, no acute distress  Respiratory: nonlabored breathing on room air   Cardiovascular: regular rate   GI: soft, nondistended, appropriately tender to palpation around ostomy, ostomy pink and well perfused with no output in bag, incision clean/dry/intact, PATY serosanguinous output 100ml/24 hrs   Skin: no rashes    Data     I have personally reviewed the following data over the past 24  hrs:    14.6 (H)  \   7.3 (L)   / 177     138 108 (H) 76.0 (H) /  173 (H)   4.8 19 (L) 2.32 (H) \       Imaging results reviewed over the past 24 hrs:   No results found for this or any previous visit (from the past 24 hours).

## 2025-06-01 NOTE — PLAN OF CARE
Goal Outcome Evaluation:      Plan of Care Reviewed With: patient    Overall Patient Progress: improvingOverall Patient Progress: improving    Outcome Evaluation: See note below    ICU End of Shift Summary. See flowsheets for vital signs and detailed assessment.     Changes this shift: A&O x 4, follows commands. Denies nausea and SOB. Pain managed with scheduled meds. Remains on 1L NC while sleeping. BP meeting SBP and MAP goals; HR zhou in 50s. No BM this shift. 150 mL UO utilizing external catheter. PATY output around 15mL/hr.      Plan: Trend labs and vitals.

## 2025-06-01 NOTE — PROGRESS NOTES
"TRANSPLANT HEPATOLOGY    Assessment  75 Y F s/p LT 2002 for PBC. Here for sigmoidectomy/coloscopy.   Prev IS SRL, pred    Plan  No sirolimus  Continue pred/steroids per primary team. If decreasing back to IS dosing, would start on pred 10 until tac level therapeutic (goal 3-5)  Restart tac 0.5 mg po bid. Ok to start tonight.  LFTs, BMP in AM  Eneida Montano MD    Hepatology/Liver Transplant  Medical Director, Liver Transplantation  [facility]  ============================================================================================      S; Saw pt. Progressing, getting lines out    Liver Function Studies -   Recent Labs   Lab Test 06/01/25  0406   PROTTOTAL 5.0*   ALBUMIN 2.5*   BILITOTAL 0.2   ALKPHOS 137   AST 17   ALT 30     CBC RESULTS:   Recent Labs   Lab Test 06/01/25  0406   WBC 14.6*   RBC 2.65*   HGB 7.3*   HCT 23.0*   MCV 87   MCH 27.5   MCHC 31.7   RDW 18.5*        Creatinine   Date Value Ref Range Status   06/01/2025 2.32 (H) 0.51 - 0.95 mg/dL Final   09/18/2020 1.3 mg/dL Final     O  BP (!) 168/89 (BP Location: Left arm, Cuff Size: Adult Small)   Pulse 61   Temp 98.3  F (36.8  C) (Oral)   Resp 10   Ht 1.702 m (5' 7\")   Wt 82.4 kg (181 lb 10.5 oz)   LMP 06/01/1988 (Approximate)   SpO2 96%   BMI 28.45 kg/m    Alert, sitting up in chair      "

## 2025-06-01 NOTE — PROGRESS NOTES
[facility]    Progress Note - Medicine Service, JOVANNY TEAM 3       Date of Admission:  5/28/2025    Assessment & Plan   Luz Thompson is a 75 year old female who was admitted on 5/28/2025 with past medical history of atrial fibrillation, DVT on eliquis, CVA, CKD III, HFpEF (TTE 3/9/25 EF 60-65%), T2DM, biliary cirrhosis s/p liver transplant in 2002, EBV, HTN, HLD, Sjogren's syndrome, Basal Cell Carincoma s/p MOHS on 4/8/25, thyroid cancer s/p thyroidectomy in 2010, Sjogren's syndrome, and diverticulitis with recent admission from 4/14-4/23 for ESBL UTI & E. Faecalis bacteremia related to sigmoid microperforation (treated with IV ertapenem) who is admitted for abdominal pain and hypotension, found to have smoldering diverticulitis with 4.1cm abscess s/p open sigmoidectomy and end colostomy on 5/30/25, with Psuedomonas positive blood cultures drawn 5/28. Transferred from the ICU to the floor on 6/1 given stability off pressors.    # Diverticulitis c/b abscess s/p open sigmoidectomy with end colostomy 5/30/25  # Sepsis 2/2 Klebsiella pneumoniae and Pseudomonas aeruginosa bacteremia 5/28, likely of intra-abdominal origin   # Hypotension 2/2 sepsis  Patient now hypertensive, after coming off pressors on 5/30. Currently with ongoing IV steroid taper, will plan to stop continuous maintenance fluids. Will hold PTA antihypertensives while titrating and resume when able. Plan to continue meropenem for psuedomonas coverage and switch vancomycin to linezolid iso DERREK for coverage of previous enterococcus positive cultures.  - CRS consulted   - Continue full liquid diet   - subcutaneous heparin for DVT ppx, continue to hold eliquis for now  - Hx of liver transplant - immunosuppression per transplant immunology team   - Continue WOC ostomy cares, IS, encourage ambulation   - Ron removed POD0, continue PATY drain   - Transplant ID signed off  - 5/31 Ertapenem to meropenem for  pseudomonas coverage  - stop vancomycin, switch to linezolid for enterococcal bacteremia until POD5 iso DERREK  - Pain regimen:   - Scheduled: Robaxin QID, tylenol 975mg BID, gabapentin 300mg QHS, Lidocaine patches daily, Diclofenac QID  -  PRN: PO oxycodone 5mg  - Blood cultures drawn 5/28 positive for Klebsiella and Pseudomonas  - follow blood cultures drawn 5/31  - continue IV hydrocortisone 75mg q6h (100mg q6h since 5/29)   - If decreasing back to IS dosing, would start on pred 10 until tac level therapeutic (goal 3-5)   - s/p 500ml LR for low urine output  - discontinue continuous 75ml/h IV LR  - hold PTA amlodipine while tapering fluids and IV steroids    # DERREK on CKD3  # Bilateral stent placement per urology 5/29  # ATN  Consider diuresis in AM to encourage increased urine output in setting of likely ATN. DERREK not resolving despite fluid resuscitation.  - Baseline creatinine 1.3-1.6  - Cr 2.32  - external catheter   - PTA estradiol vaginal  cream    # Primary biliary cirrhosis s/p liver transplant 2002  # EBV +  # Reactive Leukocytosis   # Sjogren's syndrome  - Transplant Hepatology consulted  - No sirolimus  - If decreasing back to IS dosing, would start on pred 10 until tac level therapeutic (goal 3-5)   - Restart tac 0.5 mg po BID   - ordered suspension formulation instead of capsule due to gelatin allergy  - CMP in AM  - PTA Nystatin QID  - PTA Ursodiol     # Acute post-op pain  # high risk delirium   - delirium precaution   - Monitor neurological status. Delirium preventions and precautions.   - pain regimen as above  - PTA sertraline  - melatonin Q HS  - PRN narcan    # Protein calorie deficit malnutrition    - Nutrition consulted  - Supplements: Gelatin Plus TID with meals + PRN per patient request  - Thera-Vit 1 tablet daily     # Post operative ventilatory support, resolved  # Acute hypoxic respiratory support   Currently on 1L NC.  - Supplemental oxygen to keep saturation above 92 %.  - PRN albuterol      # Paroxysmal A-fib on apixaban   # Hypertension  # HFpEF (TTE 3/9/25 EF 60-65%)   # history of CVA  # Vasoplagia  - Monitor hemodynamic status.  - Levophed off since 0400 5/31  - MAP goal >65  - PTA ezetimibe  - PTA amlodipine 5mg BID  - HOLD PTA apixaban 2.5mg BID, CRS to advise when able to restart  - 5/30 SQH  - HOLDing metoprolol tartrate 12.5 mg BID (HR 50's)  - SBP >100 or MAP >65    # Type 2 diabetes  # Stress hyperglycemia    - MDSSI  - Goal to keep BG< 180 for optimal wound healing   - Glucose AC/HS  - hypoglycemic protocol  - hydrocortisone 75mg Q6H    # Thyroid cancer status post thyroidectomy 2010  - PTA calcitriol 0.25mcg  - PTA levothyroxine 175mcg    # Acute blood loss anemia    # Anemia of critical illness  # coagulopathy due to cirrhosis and surgical blood loss    # thrombocytopenia   # anemia of critical illness   - Transfuse if hgb <7.0 or signs/symptoms of hypoperfusion. Monitor and trend.   - HOLD PTA apixaban 2.5mg BID  - PTA Ferous sulfate, folic acid     # Weakness and deconditioning of critical illness   - Physical and occupational therapy consult   - up with assist, fall precaution     # Basal cell carcinoma status post Mohs April 2025        Diet: Snacks/Supplements Adult: Other; PRN per pt request; With Meals  Snacks/Supplements Adult: Other; Offer pt Gelatein Plus or 20 TID with meals - please ask what flavor she prefers with each meal; With Meals  Full Liquid Diet  Calorie Counts    DVT Prophylaxis: Heparin SQ  Ron Catheter: Not present  Fluids: PO  Lines: PRESENT      Arterial Line 05/29/25 Brachial-Site Assessment: WDL      Cardiac Monitoring: None  Code Status: Full Code      Clinically Significant Risk Factors          # Hyperchloremia: Highest Cl = 110 mmol/L in last 2 days, will monitor as appropriate          # Hypoalbuminemia: Lowest albumin = 2.4 g/dL at 5/29/2025  3:15 PM, will monitor as appropriate    # Acute Kidney Injury, unspecified: based on a >150% or 0.3 mg/dL  "increase in last creatinine compared to past 90 day average, will monitor renal function  # Hypertension: Noted on problem list            # Overweight: Estimated body mass index is 28.45 kg/m  as calculated from the following:    Height as of this encounter: 1.702 m (5' 7\").    Weight as of this encounter: 82.4 kg (181 lb 10.5 oz)., PRESENT ON ADMISSION     # Financial/Environmental Concerns: none         Social Drivers of Health   Tobacco Use: Medium Risk (5/27/2025)    Patient History     Smoking Tobacco Use: Former     Smokeless Tobacco Use: Never   Physical Activity: Insufficiently Active (11/9/2023)    Received from [facility]    Exercise Vital Sign     Days of Exercise per Week: 5 days     Minutes of Exercise per Session: 10 min         Disposition Plan     Medically Ready for Discharge: Anticipated in 5+ Days         The patient's care was discussed with the Attending Physician, Dr. Tang.    Sindhu Brown MD  Medicine Service, [address]  Securely message with [facility] (more info)  Text page via [employer] Paging/Directory   See signed in provider for up to date coverage information  ______________________________________________________________________    Interval History   Transfer from ICU to medicine. Patient alert, oriented, conversational. NAD, no acute concerns. Has abdominal pain but much improved.    Physical Exam   Vital Signs: Temp: 98.3  F (36.8  C) Temp src: Oral BP: (!) 168/89 Pulse: 61   Resp: 10 SpO2: 96 % O2 Device: Nasal cannula Oxygen Delivery: 1 LPM  Weight: 181 lbs 10.54 oz    General Appearance: NAD, sitting up in chair next to bed  Respiratory: CTAB, no crackles, no wheeze  Cardiovascular: RRR, no m/r/g  GI: mild tenderness in all quadrants, ostomy with minimal output, PATY drain with serosanguinous output  MSK/Skin: no rashes on exposed skin, moving all extremities  Other: A&Ox4, no focal CN deficits     Medical Decision Making "       Please see A&P for additional details of medical decision making.      Data     I have personally reviewed the following data over the past 24 hrs:    14.6 (H)  \   7.3 (L)   / 177     138 108 (H) 76.0 (H) /  173 (H)   4.8 19 (L) 2.32 (H) \     ALT: 30 AST: 17 AP: 137 TBILI: 0.2   ALB: 2.5 (L) TOT PROTEIN: 5.0 (L) LIPASE: N/A       Imaging results reviewed over the past 24 hrs:   No results found for this or any previous visit (from the past 24 hours).

## 2025-06-02 ENCOUNTER — APPOINTMENT (OUTPATIENT)
Dept: PHYSICAL THERAPY | Facility: CLINIC | Age: 75
End: 2025-06-02
Payer: MEDICARE

## 2025-06-02 LAB
ACB COMPLEX DNA BLD POS QL NAA+NON-PROBE: NOT DETECTED
ANION GAP SERPL CALCULATED.3IONS-SCNC: 13 MMOL/L (ref 7–15)
B FRAGILIS DNA BLD POS QL NAA+NON-PROBE: NOT DETECTED
BACTERIA SPEC CULT: ABNORMAL
BUN SERPL-MCNC: 60.1 MG/DL (ref 8–23)
C ALBICANS DNA BLD POS QL NAA+NON-PROBE: NOT DETECTED
C AURIS DNA BLD POS QL NAA+NON-PROBE: NOT DETECTED
C GATTII+NEOFOR DNA BLD POS QL NAA+N-PRB: NOT DETECTED
C GLABRATA DNA BLD POS QL NAA+NON-PROBE: NOT DETECTED
C KRUSEI DNA BLD POS QL NAA+NON-PROBE: NOT DETECTED
C PARAP DNA BLD POS QL NAA+NON-PROBE: NOT DETECTED
C TROPICLS DNA BLD POS QL NAA+NON-PROBE: NOT DETECTED
CALCIUM SERPL-MCNC: 8.1 MG/DL (ref 8.8–10.4)
CHLORIDE SERPL-SCNC: 108 MMOL/L (ref 98–107)
CREAT SERPL-MCNC: 1.83 MG/DL (ref 0.51–0.95)
E CLOAC COMP DNA BLD POS NAA+NON-PROBE: NOT DETECTED
E COLI DNA BLD POS QL NAA+NON-PROBE: NOT DETECTED
E FAECALIS DNA BLD POS QL NAA+NON-PROBE: NOT DETECTED
E FAECIUM DNA BLD POS QL NAA+NON-PROBE: DETECTED
EGFRCR SERPLBLD CKD-EPI 2021: 28 ML/MIN/1.73M2
ENTEROBACTERALES DNA BLD POS NAA+N-PRB: NOT DETECTED
ERYTHROCYTE [DISTWIDTH] IN BLOOD BY AUTOMATED COUNT: 18.3 % (ref 10–15)
GLUCOSE BLDC GLUCOMTR-MCNC: 176 MG/DL (ref 70–99)
GLUCOSE BLDC GLUCOMTR-MCNC: 205 MG/DL (ref 70–99)
GLUCOSE BLDC GLUCOMTR-MCNC: 211 MG/DL (ref 70–99)
GLUCOSE BLDC GLUCOMTR-MCNC: 213 MG/DL (ref 70–99)
GLUCOSE BLDC GLUCOMTR-MCNC: 241 MG/DL (ref 70–99)
GLUCOSE SERPL-MCNC: 212 MG/DL (ref 70–99)
GP B STREP DNA BLD POS QL NAA+NON-PROBE: NOT DETECTED
HAEM INFLU DNA BLD POS QL NAA+NON-PROBE: NOT DETECTED
HCO3 SERPL-SCNC: 20 MMOL/L (ref 22–29)
HCT VFR BLD AUTO: 25.8 % (ref 35–47)
HGB BLD-MCNC: 8.1 G/DL (ref 11.7–15.7)
K OXYTOCA DNA BLD POS QL NAA+NON-PROBE: NOT DETECTED
KLEBSIELLA SP DNA BLD POS QL NAA+NON-PRB: NOT DETECTED
KLEBSIELLA SP DNA BLD POS QL NAA+NON-PRB: NOT DETECTED
L MONOCYTOG DNA BLD POS QL NAA+NON-PROBE: NOT DETECTED
MAGNESIUM SERPL-MCNC: 2.5 MG/DL (ref 1.7–2.3)
MCH RBC QN AUTO: 27.7 PG (ref 26.5–33)
MCHC RBC AUTO-ENTMCNC: 31.4 G/DL (ref 31.5–36.5)
MCV RBC AUTO: 88 FL (ref 78–100)
N MEN DNA BLD POS QL NAA+NON-PROBE: NOT DETECTED
P AERUGINOSA DNA BLD POS NAA+NON-PROBE: NOT DETECTED
PHOSPHATE SERPL-MCNC: 3.9 MG/DL (ref 2.5–4.5)
PLATELET # BLD AUTO: 190 10E3/UL (ref 150–450)
POTASSIUM SERPL-SCNC: 4.6 MMOL/L (ref 3.4–5.3)
PROTEUS SP DNA BLD POS QL NAA+NON-PROBE: NOT DETECTED
RBC # BLD AUTO: 2.92 10E6/UL (ref 3.8–5.2)
S AUREUS DNA BLD POS QL NAA+NON-PROBE: NOT DETECTED
S AUREUS+CONS DNA BLD POS NAA+NON-PROBE: NOT DETECTED
S EPIDERMIDIS DNA BLD POS QL NAA+NON-PRB: NOT DETECTED
S LUGDUNENSIS DNA BLD POS QL NAA+NON-PRB: NOT DETECTED
S MALTOPHILIA DNA BLD POS QL NAA+NON-PRB: NOT DETECTED
S MARCESCENS DNA BLD POS NAA+NON-PROBE: NOT DETECTED
S PNEUM DNA BLD POS QL NAA+NON-PROBE: NOT DETECTED
S PYO DNA BLD POS QL NAA+NON-PROBE: NOT DETECTED
SALMONELLA DNA BLD POS QL NAA+NON-PROBE: NOT DETECTED
SODIUM SERPL-SCNC: 141 MMOL/L (ref 135–145)
STREPTOCOCCUS DNA BLD POS NAA+NON-PROBE: NOT DETECTED
VANA+VANB ISLT/SPM QL: DETECTED
WBC # BLD AUTO: 11.5 10E3/UL (ref 4–11)

## 2025-06-02 PROCEDURE — 84100 ASSAY OF PHOSPHORUS: CPT

## 2025-06-02 PROCEDURE — 250N000013 HC RX MED GY IP 250 OP 250 PS 637: Performed by: NURSE PRACTITIONER

## 2025-06-02 PROCEDURE — 250N000011 HC RX IP 250 OP 636: Mod: JZ | Performed by: NURSE PRACTITIONER

## 2025-06-02 PROCEDURE — 99233 SBSQ HOSP IP/OBS HIGH 50: CPT | Performed by: INTERNAL MEDICINE

## 2025-06-02 PROCEDURE — 250N000011 HC RX IP 250 OP 636

## 2025-06-02 PROCEDURE — 250N000012 HC RX MED GY IP 250 OP 636 PS 637

## 2025-06-02 PROCEDURE — 250N000013 HC RX MED GY IP 250 OP 250 PS 637

## 2025-06-02 PROCEDURE — 85027 COMPLETE CBC AUTOMATED: CPT | Performed by: PHYSICIAN ASSISTANT

## 2025-06-02 PROCEDURE — 250N000013 HC RX MED GY IP 250 OP 250 PS 637: Performed by: PHYSICIAN ASSISTANT

## 2025-06-02 PROCEDURE — 250N000013 HC RX MED GY IP 250 OP 250 PS 637: Performed by: SURGERY

## 2025-06-02 PROCEDURE — 36415 COLL VENOUS BLD VENIPUNCTURE: CPT

## 2025-06-02 PROCEDURE — 97110 THERAPEUTIC EXERCISES: CPT | Mod: GP

## 2025-06-02 PROCEDURE — G0463 HOSPITAL OUTPT CLINIC VISIT: HCPCS

## 2025-06-02 PROCEDURE — 87040 BLOOD CULTURE FOR BACTERIA: CPT

## 2025-06-02 PROCEDURE — 80048 BASIC METABOLIC PNL TOTAL CA: CPT

## 2025-06-02 PROCEDURE — 97530 THERAPEUTIC ACTIVITIES: CPT | Mod: GP

## 2025-06-02 PROCEDURE — 83735 ASSAY OF MAGNESIUM: CPT

## 2025-06-02 PROCEDURE — 120N000002 HC R&B MED SURG/OB UMMC

## 2025-06-02 RX ORDER — METHOCARBAMOL 500 MG/1
500 TABLET, FILM COATED ORAL 4 TIMES DAILY PRN
Status: DISCONTINUED | OUTPATIENT
Start: 2025-06-02 | End: 2025-06-12 | Stop reason: HOSPADM

## 2025-06-02 RX ORDER — HYDROCORTISONE SODIUM SUCCINATE 100 MG/2ML
50 INJECTION INTRAMUSCULAR; INTRAVENOUS EVERY 6 HOURS
Status: DISCONTINUED | OUTPATIENT
Start: 2025-06-02 | End: 2025-06-03

## 2025-06-02 RX ADMIN — METHOCARBAMOL 500 MG: 500 TABLET ORAL at 10:11

## 2025-06-02 RX ADMIN — ACETAMINOPHEN 975 MG: 325 TABLET, FILM COATED ORAL at 10:11

## 2025-06-02 RX ADMIN — NYSTATIN 1000000 UNITS: 100000 SUSPENSION ORAL at 21:48

## 2025-06-02 RX ADMIN — CALCITRIOL CAPSULES 0.25 MCG 0.25 MCG: 0.25 CAPSULE ORAL at 10:11

## 2025-06-02 RX ADMIN — HYDROCORTISONE SODIUM SUCCINATE 50 MG: 100 INJECTION, POWDER, FOR SOLUTION INTRAMUSCULAR; INTRAVENOUS at 21:49

## 2025-06-02 RX ADMIN — HYDROCORTISONE SODIUM SUCCINATE 75 MG: 100 INJECTION, POWDER, FOR SOLUTION INTRAMUSCULAR; INTRAVENOUS at 10:10

## 2025-06-02 RX ADMIN — URSODIOL 250 MG: 250 TABLET ORAL at 10:12

## 2025-06-02 RX ADMIN — THERA TABS 1 TABLET: TAB at 10:11

## 2025-06-02 RX ADMIN — URSODIOL 250 MG: 250 TABLET ORAL at 21:49

## 2025-06-02 RX ADMIN — MEROPENEM 500 MG: 500 INJECTION, POWDER, FOR SOLUTION INTRAVENOUS at 01:06

## 2025-06-02 RX ADMIN — LINEZOLID 600 MG: 600 TABLET, FILM COATED ORAL at 21:48

## 2025-06-02 RX ADMIN — GABAPENTIN 300 MG: 250 SOLUTION ORAL at 21:48

## 2025-06-02 RX ADMIN — Medication 1000 MCG: at 21:49

## 2025-06-02 RX ADMIN — HEPARIN SODIUM 5000 UNITS: 5000 INJECTION, SOLUTION INTRAVENOUS; SUBCUTANEOUS at 21:49

## 2025-06-02 RX ADMIN — Medication 1 EACH: at 17:23

## 2025-06-02 RX ADMIN — HEPARIN SODIUM 5000 UNITS: 5000 INJECTION, SOLUTION INTRAVENOUS; SUBCUTANEOUS at 12:10

## 2025-06-02 RX ADMIN — OMEGA-3-ACID ETHYL ESTERS 1 G: 1 CAPSULE, LIQUID FILLED ORAL at 10:10

## 2025-06-02 RX ADMIN — HYDROCORTISONE SODIUM SUCCINATE 75 MG: 100 INJECTION, POWDER, FOR SOLUTION INTRAMUSCULAR; INTRAVENOUS at 04:13

## 2025-06-02 RX ADMIN — EZETIMIBE 10 MG: 10 TABLET ORAL at 21:48

## 2025-06-02 RX ADMIN — MEROPENEM 500 MG: 500 INJECTION, POWDER, FOR SOLUTION INTRAVENOUS at 12:10

## 2025-06-02 RX ADMIN — HYDROCORTISONE SODIUM SUCCINATE 50 MG: 100 INJECTION, POWDER, FOR SOLUTION INTRAMUSCULAR; INTRAVENOUS at 15:30

## 2025-06-02 RX ADMIN — Medication 20000 UNITS: at 10:09

## 2025-06-02 RX ADMIN — HEPARIN SODIUM 5000 UNITS: 5000 INJECTION, SOLUTION INTRAVENOUS; SUBCUTANEOUS at 04:14

## 2025-06-02 RX ADMIN — LEVOTHYROXINE SODIUM 175 MCG: 0.17 TABLET ORAL at 10:11

## 2025-06-02 RX ADMIN — NYSTATIN 1000000 UNITS: 100000 SUSPENSION ORAL at 10:10

## 2025-06-02 RX ADMIN — NYSTATIN 1000000 UNITS: 100000 SUSPENSION ORAL at 15:21

## 2025-06-02 RX ADMIN — TACROLIMUS 0.5 MG: 5 CAPSULE ORAL at 10:09

## 2025-06-02 RX ADMIN — DICLOFENAC SODIUM 4 G: 10 GEL TOPICAL at 10:10

## 2025-06-02 RX ADMIN — SENNOSIDES 2 TABLET: 8.6 TABLET, FILM COATED ORAL at 21:48

## 2025-06-02 RX ADMIN — SENNOSIDES 2 TABLET: 8.6 TABLET, FILM COATED ORAL at 10:10

## 2025-06-02 RX ADMIN — LINEZOLID 600 MG: 600 TABLET, FILM COATED ORAL at 10:11

## 2025-06-02 RX ADMIN — SERTRALINE HYDROCHLORIDE 50 MG: 50 TABLET ORAL at 10:11

## 2025-06-02 RX ADMIN — TACROLIMUS 0.5 MG: 5 CAPSULE ORAL at 17:19

## 2025-06-02 ASSESSMENT — ACTIVITIES OF DAILY LIVING (ADL)
ADLS_ACUITY_SCORE: 55
ADLS_ACUITY_SCORE: 55
ADLS_ACUITY_SCORE: 59
ADLS_ACUITY_SCORE: 55
ADLS_ACUITY_SCORE: 55
ADLS_ACUITY_SCORE: 59
ADLS_ACUITY_SCORE: 55
ADLS_ACUITY_SCORE: 59
ADLS_ACUITY_SCORE: 55
ADLS_ACUITY_SCORE: 59
ADLS_ACUITY_SCORE: 55
ADLS_ACUITY_SCORE: 59
ADLS_ACUITY_SCORE: 55
ADLS_ACUITY_SCORE: 59
ADLS_ACUITY_SCORE: 59
ADLS_ACUITY_SCORE: 55
ADLS_ACUITY_SCORE: 59

## 2025-06-02 NOTE — CONSULTS
[facility] Nurse Inpatient Assessment     Consulted for: new end colostomy    Summary: 5/30 - Pt's daughter Gloria at bedside for pouch change demo, would like to be present for education    [facility] nurse follow-up plan: daily    Patient History (according to provider note(s):      75 year old female with history of liver transplant admitted for smoldering diverticulitis with 4.1cm abscess s/p open sigmoidectomy and end colostomy on 5/29/25, recovering appropriately.     Assessment:      Areas visualized during today's visit: Focused: Abdomen      6/2 LLQ    Assessment of new end Colostomy:  Diagnosis Pertinent to Stoma: Diverticulitis with perforation      Surgery Date: 5/29/25  Surgeon: Al Campbell MD       [facility]  Pouching system in place on assessment today: Marina two piece, cut to fit, soft convex, barrier ring, and skin prep   Pouch last changed/wear time: 3 days   Reason for pouch change today: ostomy education and routine schedule  Effectiveness of current pouching/ supply plan: Recommend continuing current plan  Change made with ostomy management today: No  Pouching system placed today: [place] two piece, cut to fit, soft convex, barrier ring, and skin prep   Supplies: gathered, at bedside, and discussed with RN  Last photo: 6/2/25  Stoma location: LLQ  Stoma size: 32 mm   Stoma appearance: edge dusky from 6-12 o'clock, small amt necrotic material at 6 o'clock, red, round, moist, and slightly protuberant (in soft crease/fold at 3 and 9 o'clock)  Mucocutaneous junction: appears intact  Peristomal complication(s): none   Output: watery brown stool  Output volume emptied during visit: 20ml  Abdominal assessment: Soft  Surgical site(s): dressing dry and intact  NG still in place? No  Pain: denies  Is patient still on a PCA? No    Ostomy education assessment:  Participant of teaching session today: patient  and family member Daughter Gloria  Education  completed today: Stoma assessment, Pouching system assessment , Refitting of appliance , Pouch change demonstration, Introduction to pouches, Peristomal skin care, Odor/flatus management , and Infection prevention/hygiene   Educational materials/methods: Verbal, Written, Demonstration, Return demonstration, FOD [place] education hand out, Product sample, Hands on, Addressed patient/caregiver questions/concerns, and Left packet of information at bedside   Education still needed: Pouch change return demonstration, Pouch emptying demonstration, Pouch emptying return demonstration, Intake and output recording, Fluid and electrolyte balance , Importance of hydration, When to seek medical attention, Low fiber diet , Hernia prevention, Lifestyle adjustments , and Discharge instructions  Learning Comprehension:   Psychosocial assessment: Pt says she wants to be able to change pouch after discharge - plans to return to independent living apartment with some assistance, daughter can assist after discharge if needed, wants to be present for teaching  Patient readiness for education today: observing, attentive, and active participation,   Following today's visit: patient  and family member daughter Gloria is able to demonstrate;         1. How to empty their pouch? Yes and Able to verbalize steps        2. How to change their pouch? with heavy assist and Needs additional practice         3. How to read and record intake and output correctly? No  Preparation for discharge completed: No discharge preparation started yet  Preparation for discharge still needed: Placed prescription recommendations in discharge navigator for MD to sign, Ensured patient has extra supplies for discharge, Discussed how to order supplies after discharge , Ordered samples from  after gaining consent from patient/caregiver, Discussed how and when to make an outpatient WOC nurse appointment after discharge, Prepared for discharge home with home  "care, and Discuss signs/symptoms of when to seek medical attention  Pt support system on discharge: Daughter Gloria  [facility] recommend home care? By [facility] RN if possible  Face to face time: 45 minutes    Treatment Plan:     LLQ Colostomy pouching plan:   Pouching system: ostomy supplies pouches: Marina 57 FECAL ([id number]) ostomy supplies barrier: [place] 57mm SOFT CONVEX ([id number])  Accessories used: [facility] ostomy accessories: 2\" Cera Barrier Ring ([id number]) and Cavilon no sting barrier film ([id number])   Frequency of pouch changes: Twice weekly and PRN leakage  WO follow up plan: Daily Monday-Friday (as able)  Bedside RN interventions: Change pouch PRN if leaking using the supplies above, Empty pouch when 1/3 to 1/2 full, ensure to clean pouch outlet after emptying to prevent odor, Notify [facility] for ongoing pouch leakage, Document stoma appearance and output volume, color, and consistency every shift, Encourage patient to empty pouch with assist, and Assist patient to measure and record output     Orders: Written      DATA:     Current support surface: Standard    Containment of urine/stool: Suction based external urinary catheter  and Colostomy pouch  BMI: Body mass index is 28.28 kg/m .   Active diet order: Orders Placed This Encounter      Clear Liquid Diet     Output: I/O last 3 completed shifts:  In: 1756.64 [P.O.:270; I.V.:1436.64; IV Piggyback:50]  Out: 1680 [Urine:1250; Drains:430]     Labs:   Recent Labs   Lab 05/30/25  0412 05/29/25  1515 05/29/25  1051 05/29/25  0546   ALBUMIN  --  2.4*  --   --    HGB 8.6* 8.5*   < > 7.6*   INR  --   --   --  1.49*   WBC 20.2* 20.2*  --  9.1   A1C  --  6.3*  --   --     < > = values in this interval not displayed.     Pressure injury risk assessment:   Sensory Perception: 2-->very limited  Moisture: 3-->occasionally moist  Activity: 1-->bedfast  Mobility: 2-->very limited  Nutrition: 2-->probably inadequate  Friction and Shear: 2-->potential problem  Shashank Score: 12    Pager no longer " is use, please contact through VHX group: [facility] Nurse [place]   Dept. Office Number: [phone]    Katelyn Parikh RN CWOCN

## 2025-06-02 NOTE — PROGRESS NOTES
Transferred to:  @ 0541  Status at time of transfer: VSS on RA, A&O x 4.   Belongings: Phone, ipad, hearing aid , and glasses in blue bag.Pt wearing hearing aids. 2 bags of clothes and shoes. Black personal walker.  Chart and medications: Sent with pt   Family notified: Yes, daughter.

## 2025-06-02 NOTE — PROGRESS NOTES
[employer]    Progress Note - Medicine Service, JOVANNY TEAM 3       Date of Admission:  5/28/2025    Assessment & Plan   Luz Thompson is a 75 year old female who was admitted on 5/28/2025 with past medical history of atrial fibrillation, DVT on eliquis, CVA, CKD III, HFpEF (TTE 3/9/25 EF 60-65%), T2DM, biliary cirrhosis s/p liver transplant in 2002, EBV, HTN, HLD, Sjogren's syndrome, Basal Cell Carincoma s/p MOHS on 4/8/25, thyroid cancer s/p thyroidectomy in 2010, Sjogren's syndrome, and diverticulitis with recent admission from 4/14-4/23 for ESBL UTI & E. Faecalis bacteremia related to sigmoid microperforation (treated with IV ertapenem) who is admitted for abdominal pain and hypotension, found to have smoldering diverticulitis with 4.1cm abscess s/p open sigmoidectomy and end colostomy on 5/30/25, with Psuedomonas positive blood cultures drawn 5/28. Transferred from the ICU to the floor on 6/1 given stability off pressors.    Today:  - ADAT  - Vitamin A level for AM  - taper IV steroids  - will need to arrange line holiday, new PIV needed and then can likely remove chronic midline   - Transplant ID consult    # Diverticulitis c/b abscess s/p open sigmoidectomy with end colostomy 5/30/25  # Sepsis 2/2 Klebsiella pneumoniae and Pseudomonas aeruginosa bacteremia 5/28, likely of intra-abdominal origin   # Hypotension 2/2 sepsis  Patient now hypertensive, after coming off pressors on 5/30. Currently with ongoing IV steroid taper, will plan to stop continuous maintenance fluids. Will hold PTA antihypertensives while titrating and resume when able. Plan to continue meropenem for psuedomonas coverage and switch vancomycin to linezolid iso DERREK for coverage of previous enterococcus positive cultures.  - CRS consulted   - ADAT  - subcutaneous heparin for DVT ppx, continue to hold eliquis for now  - Hx of liver transplant - immunosuppression per transplant immunology  team   - Continue WOC ostomy cares, IS, encourage ambulation   - Ron removed POD0, continue PATY drain   - Transplant ID signed off, reconsulted for recommendations for abx duration given new VRE bacteremia, line holiday?  - continue meropenem (5/31- *) for pseudomonas coverage  - continue linezolid (6/1 -*)  for enterococcal bacteremia, will need to determine duration of abx given complex history of VRE bacteremia and new episode   - Pain regimen:   - Scheduled: Robaxin QID, tylenol 975mg BID, gabapentin 300mg QHS, Lidocaine patches daily, Diclofenac QID  -  PRN: PO oxycodone 5mg  - Blood cultures drawn 5/28 positive for Klebsiella and Pseudomonas  - follow blood cultures drawn 5/31  - decrease IV hydrocortisone 50mg q6h (100mg q6h since 5/29)   - If decreasing back to IS dosing, would pause on pred 10 until tac level therapeutic (goal 3-5)   - hold PTA amlodipine while tapering fluids and IV steroids    # DERREK on CKD3 - likely ATN   # Bilateral stent placement per urology 5/29  # ATN  DERREK not resolving despite fluid resuscitation, suspect ATN in the setting of hypotension, shock, nephrotoxic meds   - Baseline creatinine 1.3-1.6  - external catheter   - PTA estradiol vaginal  cream  - consider repeat urine studies to confirm dx, but given improvement will hold off   - Consider diuresis in AM to encourage increased urine output in setting of likely ATN.     # Primary biliary cirrhosis s/p liver transplant 2002  # EBV +  # Reactive Leukocytosis   # Sjogren's syndrome  - Transplant Hepatology consulted  - No sirolimus  - If decreasing back to IS dosing, would start on pred 10 until tac level therapeutic (goal 3-5)   - Restarted tac 0.5 mg po BID   - ordered suspension formulation instead of capsule due to gelatin allergy  - CMP in AM  - PTA Nystatin QID  - PTA Ursodiol     # Acute post-op pain  # high risk delirium   - delirium precaution   - Monitor neurological status. Delirium preventions and precautions.   - pain  regimen as above  - PTA sertraline  - melatonin Q HS  - PRN narcan    # Protein calorie deficit malnutrition    - Nutrition consulted  - Supplements: Gelatin Plus TID with meals + PRN per patient request  - Thera-Vit 1 tablet daily   - Vitamin A level (ordered)    # Post operative ventilatory support, resolved  # Acute hypoxic respiratory support   Currently on 1L NC.  - Supplemental oxygen to keep saturation above 92 %.  - PRN albuterol     # Paroxysmal A-fib on apixaban   # Hypertension  # HFpEF (TTE 3/9/25 EF 60-65%)   # history of CVA  # Vasoplagia  - Monitor hemodynamic status.  - Levophed off since 0400 5/31  - MAP goal >65  - PTA ezetimibe  - holding PTA amlodipine 5mg BID  - HOLD PTA apixaban 2.5mg BID, CRS to advise when able to restart  - started SQH 5/30   - HOLDing metoprolol tartrate 12.5 mg BID  - holding hydralazine, when reordering would prefer 2 25 mg tabs   - SBP >100 or MAP >65  - weaning steroids as above     # Type 2 diabetes  # Stress hyperglycemia    - MDSSI  - Goal to keep BG< 180 for optimal wound healing   - Glucose AC/HS  - hypoglycemic protocol  - hydrocortisone as above    # Thyroid cancer status post thyroidectomy 2010  - PTA calcitriol 0.25mcg  - PTA levothyroxine 175mcg    # Acute blood loss anemia    # Anemia of critical illness  # coagulopathy due to cirrhosis and surgical blood loss    # thrombocytopenia   # anemia of critical illness   - Transfuse if hgb <7.0 or signs/symptoms of hypoperfusion. Monitor and trend.   - HOLD PTA apixaban 2.5mg BID  - PTA Ferous sulfate, folic acid     # Weakness and deconditioning of critical illness   - Physical and occupational therapy consult   - up with assist, fall precaution     # Basal cell carcinoma status post Mohs April 2025        Diet: Snacks/Supplements Adult: Other; PRN per pt request; With Meals  Snacks/Supplements Adult: Other; Offer pt Gelatein Plus or 20 TID with meals - please ask what flavor she prefers with each meal; With  "Meals  Full Liquid Diet  Calorie Counts    DVT Prophylaxis: Heparin SQ  Ron Catheter: Not present  Fluids: PO  Lines: PRESENT      Arterial Line 05/29/25 Brachial-Site Assessment: WDL      Cardiac Monitoring: None  Code Status: Full Code      Clinically Significant Risk Factors          # Hyperchloremia: Highest Cl = 110 mmol/L in last 2 days, will monitor as appropriate          # Hypoalbuminemia: Lowest albumin = 2.4 g/dL at 5/29/2025  3:15 PM, will monitor as appropriate    # Acute Kidney Injury, unspecified: based on a >150% or 0.3 mg/dL increase in last creatinine compared to past 90 day average, will monitor renal function  # Hypertension: Noted on problem list            # Overweight: Estimated body mass index is 28.45 kg/m  as calculated from the following:    Height as of this encounter: 1.702 m (5' 7\").    Weight as of this encounter: 82.4 kg (181 lb 10.5 oz)., PRESENT ON ADMISSION     # Financial/Environmental Concerns: none         Social Drivers of Health   Tobacco Use: Medium Risk (5/27/2025)    Patient History     Smoking Tobacco Use: Former     Smokeless Tobacco Use: Never   Physical Activity: Insufficiently Active (11/9/2023)    Received from [email or web address]    Exercise Vital Sign     Days of Exercise per Week: 5 days     Minutes of Exercise per Session: 10 min         Disposition Plan     Medically Ready for Discharge: Anticipated in 5+ Days         The patient's care was discussed with the Attending Physician, Dr. Tang.    Sindhu Brown MD  Medicine Service, [address]  Securely message with [employer] (more info)  Text page via [employer] Paging/Directory   See signed in provider for up to date coverage information  ______________________________________________________________________    Interval History   NAEON. Patient reports that L wrist PIV is bothering her. Would like her midline removed. Denies worsening abdominal pain, fevers, " chills.    Physical Exam   Vital Signs: Temp: 98.3  F (36.8  C) Temp src: Oral BP: (!) 168/89 Pulse: 61   Resp: 10 SpO2: 96 % O2 Device: Nasal cannula Oxygen Delivery: 1 LPM  Weight: 181 lbs 10.54 oz    General Appearance: NAD, sitting up in bed  Respiratory: CTAB, no crackles, no wheeze  Cardiovascular: RRR, no m/r/g  GI: mild tenderness in all quadrants, ostomy with minimal output, PATY drain with serosanguinous output, dressings CDI  MSK/Skin: no rashes on exposed skin, moving all extremities  Other: A&Ox4, no focal CN deficits     Medical Decision Making       Please see A&P for additional details of medical decision making.      Data     I have personally reviewed the following data over the past 24 hrs:    14.6 (H)  \   7.3 (L)   / 177     138 108 (H) 76.0 (H) /  173 (H)   4.8 19 (L) 2.32 (H) \     ALT: 30 AST: 17 AP: 137 TBILI: 0.2   ALB: 2.5 (L) TOT PROTEIN: 5.0 (L) LIPASE: N/A       Imaging results reviewed over the past 24 hrs:   No results found for this or any previous visit (from the past 24 hours).

## 2025-06-02 NOTE — PROGRESS NOTES
Calorie Count  Intake recorded for: 6/1  Total Kcals: 0 Total Protein: 0g  Kcals from Hospital Food: 0   Protein: 0g  Kcals from Outside Food (average):0 Protein: 0g  # Meals Ordered from Kitchen: 3 meals  # Meals Recorded: 0 meals   # Supplements Recorded: 0

## 2025-06-02 NOTE — PROGRESS NOTES
CLINICAL NUTRITION SERVICES - REASSESSMENT NOTE     RECOMMENDATIONS FOR MDs/PROVIDERS TO ORDER:  None currently, Vit A supplementation per MD, could consider checking status    Registered Dietitian Interventions:  Encouraged small, frequent meals/snacks with high protein sources  Continue MVI(consider chewable option as needed)  Encourage adequate hydration  Trial Glucerna Therapeutic Nutrition Shake contains 220 kcal, 10 grams protein, 26 grams carbohydrate, 9 grams fat, 4 grams fiber per 8 fl oz.   Kcal counts ordered 6/1-6/4 per team  Additional ONS/snack PRN    Future/Additional Recommendations:  Monitor nutrition related findings and follow up per protocol     SUBJECTIVE INFORMATION  Assessed patient in room.    CLINICAL/MEDICAL STATUS UPDATES  reviewed  CURRENT NUTRITION NEEDS  Dosing Weight: 67 kg, based on adjusted wt (based on lowest wt this admit of 81.9 kg on 5/30 and IBW of 61.4 kg)     ASSESSED NUTRITION NEEDS   Estimated Energy Needs: 7063-6148 kcals/day (25 - 30 kcals/kg)   Justification: Maintenance   Estimated Protein Needs:  grams protein/day (1.2 - 1.5 grams of pro/kg)   Justification: Hypercatabolism with critical illness and Post-op   Estimated Fluid Needs: 1 mL/kcal   Justification: Maintenance and Per provider pending fluid status     CURRENT NUTRITION ORDERS  Diet: Regular  + Gelatein 20 PRN       CURRENT INTAKE/TOLERANCE  Met with pt at bedside. Reports fair intake PTA. Denies N/V/abdominal pain. Reports small amount of output from ostomy so far. Reports having some burping since ostomy placement. Reports some difficulty swallowing pills. Reports this is a new issue and does not impact intake of food or beverages. Reports PTA she was at a TCU and had been gaining weight d/t eating 3 meals/day. Reports previously she would only eat 2 meals/day. Discussed ONS options. Pt asked appropriate questions regarding egg intake with ostomy.    Intake/Tolerance: Calorie Counts:    Intake recorded  for: 6/1         Total Kcals: 0           Total Protein: 0g  Kcals from Hospital Food: 0                           Protein: 0g  Kcals from Outside Food (average):0            Protein: 0g  # Meals Ordered from Kitchen: 3 meals  NEW FINDINGS  Weight:   Most Recent Weight: 82.4 kg (181 lb 10.5 oz)  on 5/30/25 via Bed scale  Body mass index is 28.45 kg/m .  Wt stable from admission.   Skin/wounds:  Non pressure related wounds/skin impairments   GI symptoms: Ostomy output 20 ml documented so far today.  Nutrition-relevant labs: Cl 108, CO2 20, BUN 60.1, cre 1.83, GFR 28, Mg 2.5, ionized Ca 3.9, CRP 65.6, glu 212, , A1C 6.3,   Nutrition-relevant medications: calcitriol, estrace, zetia, ferrous sulfate, folic acid, insulin, synthroid, melatonin, thera-vit, nutrisource fiber, lovaza, prednisone, senokot, tacro, ursodiol, Vit A 20 U    MALNUTRITION  % Intake: Decreased intake does not meet criteria  % Weight Loss: Weight loss does not meet criteria   Subcutaneous Fat Loss: None observed  Muscle Loss: None observed  Fluid Accumulation/Edema: None noted  Malnutrition Diagnosis: Patient does not meet two of the established criteria necessary for diagnosing malnutrition  Malnutrition Present on Admission: No    EVALUATION OF THE PROGRESS TOWARD GOALS   Previous Goals  Patient to consume % of nutritionally adequate meal trays TID, or the equivalent with supplements/snacks.   Evaluation: Progressing    Previous Nutrition Diagnosis  Increased nutrient needs (protein) related to s/p surgery as evidenced by assessed protein needs of  grams protein/day (1.2 - 1.5 grams of pro/kg).   Evaluation: No change    NUTRITION DIAGNOSIS  Increased nutrient needs (protein) related to s/p surgery as evidenced by assessed protein needs of  grams protein/day (1.2 - 1.5 grams of pro/kg).     INTERVENTIONS  Patient to consume % of nutritionally adequate meal trays TID, or the equivalent with supplements/snacks.      Goals  Patient to consume % of nutritionally adequate meal trays TID, or the equivalent with supplements/snacks.     Monitoring/Evaluation      Progress toward goals will be monitored and evaluated per policy.    Jane Burks MS, RD, LD, [facility]    6C (beds 1417-8363) + 7C (beds 9424-6073) + ED + Obs  Available in Vocera by name or unit dietitian

## 2025-06-02 NOTE — PLAN OF CARE
Goal Outcome Evaluation:      Plan of Care Reviewed With: patient    Overall Patient Progress: improvingOverall Patient Progress: improving    Outcome Evaluation: See note below.    ICU End of Shift Summary. See flowsheets for vital signs and detailed assessment.    Changes this shift: A&O x 4, follows commands. Denies nausea and SOB. Pain managed with scheduled meds. Remains on 1L NC while sleeping. BP meeting SBP and MAP goals; HR zhou in 50-60s. No BM this shift, 30 mL serosanguinous fluid output from stoma. 500 mL UO utilizing external catheter. PATY drain patent. Pt refused to take linezolid without premedicating with benadryl. Pt educated on signs of allergic reaction, provider notified, came to bedside, and provided further education. Pt agreed to take linezolid without benadryl, then immediately requested benadryl for tingling lips.       Plan: Trend labs and vitals. Transfer to floor when bed available. Order ensures, she's allergic to the gelatin. Daughter requests to change ostomy bag with WOC nurse.

## 2025-06-02 NOTE — PLAN OF CARE
"AVSS. Colostomy bag changed with WOC RN and daughter. PATY drain with serosanguinous as charted. Abdominal incision is CDI with staples. Worked with therapy today. On regular diet. Slow to take pills and some are hard to get down for her. Continue POC.   Problem: Skin Injury Risk Increased  Goal: Skin Health and Integrity  Outcome: Progressing  Intervention: Plan: Nurse Driven Intervention: Moisture Management  Recent Flowsheet Documentation  Taken 6/2/2025 1210 by Crista Quiñones RN  Moisture Interventions:   No brief in bed   Incontinence pad   Urinary collection device  Intervention: Optimize Skin Protection  Recent Flowsheet Documentation  Taken 6/2/2025 1210 by Crista Quiñones RN  Activity Management: activity adjusted per tolerance     Problem: Adult Inpatient Plan of Care  Goal: Plan of Care Review  Description: The Plan of Care Review/Shift note should be completed every shift.  The Outcome Evaluation is a brief statement about your assessment that the patient is improving, declining, or no change.  This information will be displayed automatically on your shift  note.  Outcome: Progressing  Goal: Patient-Specific Goal (Individualized)  Description: You can add care plan individualizations to a care plan. Examples of Individualization might be:  \"Parent requests to be called daily at 9am for status\", \"I have a hard time hearing out of my right ear\", or \"Do not touch me to wake me up as it startles  me\".  Outcome: Progressing  Goal: Absence of Hospital-Acquired Illness or Injury  Outcome: Progressing  Intervention: Identify and Manage Fall Risk  Recent Flowsheet Documentation  Taken 6/2/2025 1210 by Crista Quiñones RN  Safety Promotion/Fall Prevention: safety round/check completed  Goal: Optimal Comfort and Wellbeing  Outcome: Progressing  Intervention: Provide Person-Centered Care  Recent Flowsheet Documentation  Taken 6/2/2025 1210 by Crista Quiñones RN  Trust Relationship/Rapport:   care explained   choices " provided  Goal: Readiness for Transition of Care  Outcome: Progressing     Problem: Comorbidity Management  Goal: Blood Glucose Levels Within Targeted Range  Outcome: Progressing  Intervention: Monitor and Manage Glycemia  Recent Flowsheet Documentation  Taken 6/2/2025 1210 by Crista Quiñones RN  Medication Review/Management: medications reviewed  Goal: Blood Pressure in Desired Range  Outcome: Progressing  Intervention: Maintain Blood Pressure Management  Recent Flowsheet Documentation  Taken 6/2/2025 1210 by Crista Quiñones RN  Medication Review/Management: medications reviewed   Goal Outcome Evaluation:

## 2025-06-02 NOTE — SUMMARY OF CARE
Transferred from:    Report received from:  Robbin MARTEL 2 RN skin assessment completed by:       - Findings (add LDA if needed):   Care plan (primary problem) and education initiated if not already done:   MDRO education done if applicable:   Pt informed about policy regarding no IV pumps off unit: yes  Suction set up in room? yes  Flu shot ordered? (October-April only): no  Detailed Belongings: walker, cell phone, Ipad, charging station, hearing aids & , dentures, clothes, shoes, slippers, glasses x2

## 2025-06-02 NOTE — PROGRESS NOTES
"[facility]    Progress Note - Colorectal Surgery Service       Date of Admission:  5/28/2025    Assessment & Plan: Surgery   Mrs. Thompson is a 75 year old female with history of liver transplant admitted for smoldering diverticulitis with 4.1cm abscess s/p open sigmoidectomy and end colostomy on 5/30/25, recovering appropriately, awaiting return of bowel function.    - Recommend advancing to regular diet as tolerated   - Continue SQheparin for DVT ppx, continue to hold eliquis for now  - Continue ertapenem per ID recs   - Multimodal pain regimen  - Hx of liver transplant - immunosuppression per transplant immunology team   - PTA levothyroxine   - Continue WOC ostomy cares   - Ron removed POD0, continue PATY drain   - Encourage out of bed and ambulation, incentive spirometer use      Diet: Snacks/Supplements Adult: Other; PRN per pt request; With Meals  Snacks/Supplements Adult: Other; Offer pt Gelatein Plus or 20 TID with meals - please ask what flavor she prefers with each meal; With Meals  Full Liquid Diet  Calorie Counts  Room Service    DVT Prophylaxis: mechanical and SQheparin  Ron Catheter: Not present  Lines: None       Drains: PRESENT         - 1 Closed/Suction Drain(s)    - 1 Ureteral Drain/Stent(s)   Cardiac Monitoring: None  Code Status: Full Code      Clinically Significant Risk Factors          # Hyperchloremia: Highest Cl = 108 mmol/L in last 2 days, will monitor as appropriate          # Hypoalbuminemia: Lowest albumin = 2.4 g/dL at 5/29/2025  3:15 PM, will monitor as appropriate       # Hypertension: Noted on problem list            # Overweight: Estimated body mass index is 28.45 kg/m  as calculated from the following:    Height as of this encounter: 1.702 m (5' 7\").    Weight as of this encounter: 82.4 kg (181 lb 10.5 oz).        # Financial/Environmental Concerns: none         Social Drivers of Health   Tobacco Use: Medium Risk (5/27/2025)    " Patient History     Smoking Tobacco Use: Former     Smokeless Tobacco Use: Never   Physical Activity: Insufficiently Active (11/9/2023)    Received from [employer]    Exercise Vital Sign     Days of Exercise per Week: 5 days     Minutes of Exercise per Session: 10 min         Disposition Plan     Pending tolerance of advancement of diet and return of bowel function      Patient was seen and discussed with colorectal fellow, Dr. Montano, who discussed with colorectal staff, Dr. Shannan Soriano MD  General Surgery Y1  [facility]  Non-urgent messages: Securely message with [employer] (more info)  Text page via [facility] Paging/Directory     ______________________________________________________________________    Interval History   She was transferred to the floor from the ICU. She does not have nausea/vomiting on full liquid diet. Having gas in ostomy, no stool output. Up to chair, has not been ambulating. Voiding without difficulty, pain is well controlled.     Physical Exam   Vital Signs: Temp: 97.9  F (36.6  C) Temp src: Oral BP: (!) 154/82 Pulse: 63   Resp: 15 SpO2: 96 % O2 Device: None (Room air) Oxygen Delivery: 1 LPM  Weight: 181 lbs 10.54 oz  Intake/Output Summary (Last 24 hours) at 5/30/2025 0706  Last data filed at 5/30/2025 0700  Gross per 24 hour   Intake 2990.79 ml   Output 1360 ml   Net 1630.79 ml     General Appearance: Alert and oriented, no acute distress  Respiratory: nonlabored breathing on room air   Cardiovascular: regular rate   GI: soft, nondistended, appropriately tender to palpation around ostomy in LLQ, ostomy pink and well perfused with gas output and bowel sweat in bag, incision clean/dry/intact, PATY serosanguinous output 195ml/24 hrs   Skin: no rashes    Data     I have personally reviewed the following data over the past 24 hrs:    11.5 (H)  \   8.1 (L)   / 190     141 108 (H) 60.1 (H) /  212 (H)   4.6 20 (L) 1.83 (H) \     ALT: N/A AST:  N/A AP: N/A TBILI: N/A   ALB: N/A TOT PROTEIN: N/A LIPASE: N/A       Imaging results reviewed over the past 24 hrs:   No results found for this or any previous visit (from the past 24 hours).

## 2025-06-03 ENCOUNTER — APPOINTMENT (OUTPATIENT)
Dept: OCCUPATIONAL THERAPY | Facility: CLINIC | Age: 75
DRG: 329 | End: 2025-06-03
Payer: MEDICARE

## 2025-06-03 ENCOUNTER — ENROLLMENT (OUTPATIENT)
Dept: HOME HEALTH SERVICES | Facility: HOME HEALTH | Age: 75
End: 2025-06-03
Payer: MEDICARE

## 2025-06-03 LAB
ANION GAP SERPL CALCULATED.3IONS-SCNC: 10 MMOL/L (ref 7–15)
ATRIAL RATE - MUSE: 81 BPM
BUN SERPL-MCNC: 46.5 MG/DL (ref 8–23)
CALCIUM SERPL-MCNC: 8 MG/DL (ref 8.8–10.4)
CHLORIDE SERPL-SCNC: 109 MMOL/L (ref 98–107)
CREAT SERPL-MCNC: 1.41 MG/DL (ref 0.51–0.95)
DIASTOLIC BLOOD PRESSURE - MUSE: NORMAL MMHG
EGFRCR SERPLBLD CKD-EPI 2021: 38 ML/MIN/1.73M2
ERYTHROCYTE [DISTWIDTH] IN BLOOD BY AUTOMATED COUNT: 18 % (ref 10–15)
GLUCOSE BLDC GLUCOMTR-MCNC: 181 MG/DL (ref 70–99)
GLUCOSE BLDC GLUCOMTR-MCNC: 214 MG/DL (ref 70–99)
GLUCOSE BLDC GLUCOMTR-MCNC: 216 MG/DL (ref 70–99)
GLUCOSE BLDC GLUCOMTR-MCNC: 220 MG/DL (ref 70–99)
GLUCOSE BLDC GLUCOMTR-MCNC: 231 MG/DL (ref 70–99)
GLUCOSE SERPL-MCNC: 217 MG/DL (ref 70–99)
HCO3 SERPL-SCNC: 23 MMOL/L (ref 22–29)
HCT VFR BLD AUTO: 22.9 % (ref 35–47)
HGB BLD-MCNC: 7.3 G/DL (ref 11.7–15.7)
INTERPRETATION ECG - MUSE: NORMAL
MAGNESIUM SERPL-MCNC: 2.3 MG/DL (ref 1.7–2.3)
MCH RBC QN AUTO: 28 PG (ref 26.5–33)
MCHC RBC AUTO-ENTMCNC: 31.9 G/DL (ref 31.5–36.5)
MCV RBC AUTO: 88 FL (ref 78–100)
P AXIS - MUSE: 55 DEGREES
PATH REPORT.COMMENTS IMP SPEC: NORMAL
PATH REPORT.COMMENTS IMP SPEC: NORMAL
PATH REPORT.FINAL DX SPEC: NORMAL
PATH REPORT.GROSS SPEC: NORMAL
PATH REPORT.MICROSCOPIC SPEC OTHER STN: NORMAL
PATH REPORT.RELEVANT HX SPEC: NORMAL
PHOSPHATE SERPL-MCNC: 2.4 MG/DL (ref 2.5–4.5)
PHOTO IMAGE: NORMAL
PLATELET # BLD AUTO: 199 10E3/UL (ref 150–450)
POTASSIUM SERPL-SCNC: 3.8 MMOL/L (ref 3.4–5.3)
PR INTERVAL - MUSE: 200 MS
QRS DURATION - MUSE: 80 MS
QT - MUSE: 364 MS
QTC - MUSE: 435 MS
R AXIS - MUSE: 19 DEGREES
RBC # BLD AUTO: 2.61 10E6/UL (ref 3.8–5.2)
SODIUM SERPL-SCNC: 142 MMOL/L (ref 135–145)
SYSTOLIC BLOOD PRESSURE - MUSE: NORMAL MMHG
T AXIS - MUSE: 19 DEGREES
TACROLIMUS BLD-MCNC: 1 UG/L (ref 5–15)
TME LAST DOSE: ABNORMAL H
TME LAST DOSE: ABNORMAL H
VENTRICULAR RATE- MUSE: 86 BPM
WBC # BLD AUTO: 9.5 10E3/UL (ref 4–11)

## 2025-06-03 PROCEDURE — 250N000013 HC RX MED GY IP 250 OP 250 PS 637: Performed by: INTERNAL MEDICINE

## 2025-06-03 PROCEDURE — 36415 COLL VENOUS BLD VENIPUNCTURE: CPT

## 2025-06-03 PROCEDURE — 84590 ASSAY OF VITAMIN A: CPT

## 2025-06-03 PROCEDURE — 85027 COMPLETE CBC AUTOMATED: CPT | Performed by: PHYSICIAN ASSISTANT

## 2025-06-03 PROCEDURE — 250N000013 HC RX MED GY IP 250 OP 250 PS 637

## 2025-06-03 PROCEDURE — 93005 ELECTROCARDIOGRAM TRACING: CPT

## 2025-06-03 PROCEDURE — 250N000011 HC RX IP 250 OP 636

## 2025-06-03 PROCEDURE — 36415 COLL VENOUS BLD VENIPUNCTURE: CPT | Performed by: PHYSICIAN ASSISTANT

## 2025-06-03 PROCEDURE — 250N000011 HC RX IP 250 OP 636: Mod: JZ

## 2025-06-03 PROCEDURE — 80197 ASSAY OF TACROLIMUS: CPT | Performed by: NURSE PRACTITIONER

## 2025-06-03 PROCEDURE — 250N000013 HC RX MED GY IP 250 OP 250 PS 637: Performed by: NURSE PRACTITIONER

## 2025-06-03 PROCEDURE — 83735 ASSAY OF MAGNESIUM: CPT | Performed by: INTERNAL MEDICINE

## 2025-06-03 PROCEDURE — 87040 BLOOD CULTURE FOR BACTERIA: CPT

## 2025-06-03 PROCEDURE — 99233 SBSQ HOSP IP/OBS HIGH 50: CPT | Mod: GC | Performed by: STUDENT IN AN ORGANIZED HEALTH CARE EDUCATION/TRAINING PROGRAM

## 2025-06-03 PROCEDURE — 120N000002 HC R&B MED SURG/OB UMMC

## 2025-06-03 PROCEDURE — 250N000011 HC RX IP 250 OP 636: Mod: JZ | Performed by: NURSE PRACTITIONER

## 2025-06-03 PROCEDURE — 84100 ASSAY OF PHOSPHORUS: CPT | Performed by: INTERNAL MEDICINE

## 2025-06-03 PROCEDURE — 97530 THERAPEUTIC ACTIVITIES: CPT | Mod: GO

## 2025-06-03 PROCEDURE — 93010 ELECTROCARDIOGRAM REPORT: CPT | Mod: XP | Performed by: INTERNAL MEDICINE

## 2025-06-03 PROCEDURE — 250N000012 HC RX MED GY IP 250 OP 636 PS 637

## 2025-06-03 PROCEDURE — 250N000013 HC RX MED GY IP 250 OP 250 PS 637: Performed by: PHYSICIAN ASSISTANT

## 2025-06-03 PROCEDURE — 250N000011 HC RX IP 250 OP 636: Performed by: PHYSICIAN ASSISTANT

## 2025-06-03 PROCEDURE — 99233 SBSQ HOSP IP/OBS HIGH 50: CPT | Mod: 24 | Performed by: INTERNAL MEDICINE

## 2025-06-03 PROCEDURE — 250N000011 HC RX IP 250 OP 636: Performed by: STUDENT IN AN ORGANIZED HEALTH CARE EDUCATION/TRAINING PROGRAM

## 2025-06-03 PROCEDURE — 80048 BASIC METABOLIC PNL TOTAL CA: CPT

## 2025-06-03 PROCEDURE — G0463 HOSPITAL OUTPT CLINIC VISIT: HCPCS

## 2025-06-03 PROCEDURE — G0545 PR INHRENT VISIT TO INPT/OBS W CNFRM/SUSPCT INFCT DIS BY INFCT DIS SPCIALST: HCPCS | Performed by: INTERNAL MEDICINE

## 2025-06-03 PROCEDURE — 250N000013 HC RX MED GY IP 250 OP 250 PS 637: Performed by: SURGERY

## 2025-06-03 PROCEDURE — 999N000007 HC SITE CHECK

## 2025-06-03 PROCEDURE — 999N000127 HC STATISTIC PERIPHERAL IV START W US GUIDANCE

## 2025-06-03 PROCEDURE — 93010 ELECTROCARDIOGRAM REPORT: CPT | Performed by: INTERNAL MEDICINE

## 2025-06-03 PROCEDURE — 999N000040 HC STATISTIC CONSULT NO CHARGE VASC ACCESS

## 2025-06-03 RX ORDER — POTASSIUM CHLORIDE 7.45 MG/ML
10 INJECTION INTRAVENOUS
Status: DISCONTINUED | OUTPATIENT
Start: 2025-06-03 | End: 2025-06-03

## 2025-06-03 RX ORDER — HYDROCORTISONE SODIUM SUCCINATE 100 MG/2ML
25 INJECTION INTRAMUSCULAR; INTRAVENOUS EVERY 6 HOURS
Status: DISCONTINUED | OUTPATIENT
Start: 2025-06-03 | End: 2025-06-04

## 2025-06-03 RX ORDER — POTASSIUM CHLORIDE 1.5 G/1.58G
20 POWDER, FOR SOLUTION ORAL ONCE
Status: COMPLETED | OUTPATIENT
Start: 2025-06-03 | End: 2025-06-03

## 2025-06-03 RX ADMIN — HYDROCORTISONE SODIUM SUCCINATE 25 MG: 100 INJECTION, POWDER, FOR SOLUTION INTRAMUSCULAR; INTRAVENOUS at 18:47

## 2025-06-03 RX ADMIN — MEROPENEM 500 MG: 500 INJECTION, POWDER, FOR SOLUTION INTRAVENOUS at 00:07

## 2025-06-03 RX ADMIN — THERA TABS 1 TABLET: TAB at 12:34

## 2025-06-03 RX ADMIN — HEPARIN SODIUM 5000 UNITS: 5000 INJECTION, SOLUTION INTRAVENOUS; SUBCUTANEOUS at 22:04

## 2025-06-03 RX ADMIN — CALCIUM CARBONATE (ANTACID) CHEW TAB 500 MG 500 MG: 500 CHEW TAB at 03:04

## 2025-06-03 RX ADMIN — URSODIOL 250 MG: 250 TABLET ORAL at 10:06

## 2025-06-03 RX ADMIN — NYSTATIN 1000000 UNITS: 100000 SUSPENSION ORAL at 12:39

## 2025-06-03 RX ADMIN — SENNOSIDES 2 TABLET: 8.6 TABLET, FILM COATED ORAL at 12:33

## 2025-06-03 RX ADMIN — ONDANSETRON 4 MG: 2 INJECTION, SOLUTION INTRAMUSCULAR; INTRAVENOUS at 11:31

## 2025-06-03 RX ADMIN — GABAPENTIN 300 MG: 250 SOLUTION ORAL at 22:04

## 2025-06-03 RX ADMIN — POTASSIUM & SODIUM PHOSPHATES POWDER PACK 280-160-250 MG 1 PACKET: 280-160-250 PACK at 12:32

## 2025-06-03 RX ADMIN — URSODIOL 250 MG: 250 TABLET ORAL at 20:53

## 2025-06-03 RX ADMIN — NYSTATIN 1000000 UNITS: 100000 SUSPENSION ORAL at 20:52

## 2025-06-03 RX ADMIN — HEPARIN SODIUM 5000 UNITS: 5000 INJECTION, SOLUTION INTRAVENOUS; SUBCUTANEOUS at 06:01

## 2025-06-03 RX ADMIN — OXYCODONE HYDROCHLORIDE 5 MG: 5 TABLET ORAL at 11:25

## 2025-06-03 RX ADMIN — TACROLIMUS 0.5 MG: 5 CAPSULE ORAL at 10:09

## 2025-06-03 RX ADMIN — TACROLIMUS 0.5 MG: 5 CAPSULE ORAL at 18:47

## 2025-06-03 RX ADMIN — CEFTOLOZANE AND TAZOBACTAM 1.5 G: 1; .5 INJECTION, POWDER, LYOPHILIZED, FOR SOLUTION INTRAVENOUS at 15:22

## 2025-06-03 RX ADMIN — DIPHENHYDRAMINE HYDROCHLORIDE 25 MG: 25 SOLUTION ORAL at 15:19

## 2025-06-03 RX ADMIN — MEROPENEM 500 MG: 500 INJECTION, POWDER, FOR SOLUTION INTRAVENOUS at 12:31

## 2025-06-03 RX ADMIN — CEFTOLOZANE AND TAZOBACTAM 1.5 G: 1; .5 INJECTION, POWDER, LYOPHILIZED, FOR SOLUTION INTRAVENOUS at 22:04

## 2025-06-03 RX ADMIN — HEPARIN SODIUM 5000 UNITS: 5000 INJECTION, SOLUTION INTRAVENOUS; SUBCUTANEOUS at 15:28

## 2025-06-03 RX ADMIN — CALCITRIOL CAPSULES 0.25 MCG 0.25 MCG: 0.25 CAPSULE ORAL at 12:33

## 2025-06-03 RX ADMIN — Medication 20000 UNITS: at 12:33

## 2025-06-03 RX ADMIN — OMEGA-3-ACID ETHYL ESTERS 1 G: 1 CAPSULE, LIQUID FILLED ORAL at 20:53

## 2025-06-03 RX ADMIN — LINEZOLID 600 MG: 600 TABLET, FILM COATED ORAL at 10:06

## 2025-06-03 RX ADMIN — LINEZOLID 600 MG: 600 TABLET, FILM COATED ORAL at 20:53

## 2025-06-03 RX ADMIN — POTASSIUM & SODIUM PHOSPHATES POWDER PACK 280-160-250 MG 1 PACKET: 280-160-250 PACK at 15:21

## 2025-06-03 RX ADMIN — POTASSIUM CHLORIDE 20 MEQ: 1.5 POWDER, FOR SOLUTION ORAL at 09:05

## 2025-06-03 RX ADMIN — LEVOTHYROXINE SODIUM 175 MCG: 0.17 TABLET ORAL at 08:27

## 2025-06-03 RX ADMIN — Medication 1000 MCG: at 22:04

## 2025-06-03 RX ADMIN — POTASSIUM & SODIUM PHOSPHATES POWDER PACK 280-160-250 MG 1 PACKET: 280-160-250 PACK at 08:57

## 2025-06-03 RX ADMIN — HYDROCORTISONE SODIUM SUCCINATE 50 MG: 100 INJECTION, POWDER, FOR SOLUTION INTRAMUSCULAR; INTRAVENOUS at 06:02

## 2025-06-03 RX ADMIN — EZETIMIBE 10 MG: 10 TABLET ORAL at 22:04

## 2025-06-03 RX ADMIN — SENNOSIDES 2 TABLET: 8.6 TABLET, FILM COATED ORAL at 20:53

## 2025-06-03 RX ADMIN — SERTRALINE HYDROCHLORIDE 50 MG: 50 TABLET ORAL at 12:33

## 2025-06-03 RX ADMIN — OMEGA-3-ACID ETHYL ESTERS 1 G: 1 CAPSULE, LIQUID FILLED ORAL at 10:09

## 2025-06-03 RX ADMIN — HYDROCORTISONE SODIUM SUCCINATE 25 MG: 100 INJECTION, POWDER, FOR SOLUTION INTRAMUSCULAR; INTRAVENOUS at 12:41

## 2025-06-03 RX ADMIN — NYSTATIN 1000000 UNITS: 100000 SUSPENSION ORAL at 15:31

## 2025-06-03 ASSESSMENT — ACTIVITIES OF DAILY LIVING (ADL)
ADLS_ACUITY_SCORE: 63
ADLS_ACUITY_SCORE: 64
ADLS_ACUITY_SCORE: 63

## 2025-06-03 NOTE — PROGRESS NOTES
Call from Milka, Nurse Manager.  Patient has a Midline in place and Potassium order.    Per policy Administering Agents with Risk of Tissue Injury, Extravasation.  Potassium 10mEq in 100ml NS can be administered through a midline.    Plan: Ok to administer Potassium 10mEq in 100ml NS through midline.

## 2025-06-03 NOTE — PROGRESS NOTES
[facility]    Progress Note - Colorectal Surgery Service       Date of Admission:  5/28/2025    Assessment & Plan: Surgery   Mrs. Thompson is a 75 year old female with a complex PMH, some of which includes CKD stage 3, CVA and DVT on anticoag, atrial fibrillation, DM2, biliary cirrhosis s/p prior liver transplant admitted for smoldering diverticulitis with 4.1cm abscess on prolonged IV abx as an outpatient.  Was admitted on 5/29 with worsening abdominal pain, hypotension due to gram negative septicemia, diarrhea.  Now s/p exploratory laparotomy, sigmoidectomy and end colostomy on 5/30/25, recovering appropriately, awaiting return of bowel function.    - OK to adv to regular diet from CRS perspective.  Recommend protein nutritional supplements as able.    - Hgb 7.3 today from baseline 9-10s.  OK to continue SQheparin for DVT ppx, continue to hold eliquis for now.  - Appreciate ID recommendations (now on Sherine & linezolid)  - Hx of liver transplant - immunosuppression per transplant immunology team (changed from sirolimus to tac).  On IV hydrocort taper for stress dosing.   - High dose Vitamin A x30 days post-op for wound healing support in setting of immunosuppression  - Getting senokot BID  - Continue WOC ostomy cares   - Continue PATY drain   - Encourage out of bed and ambulation, incentive spirometer use   Ppx:  on Heparin TID     Diet: Snacks/Supplements Adult: Other; PRN per pt request; With Meals  Snacks/Supplements Adult: Other; Offer pt Gelatein Plus or 20 TID with meals - please ask what flavor she prefers with each meal; With Meals  Calorie Counts  Room Service  Advance Diet as Tolerated: Regular Diet Adult; Regular Diet Adult  Snacks/Supplements Adult: Glucerna; Between Meals    DVT Prophylaxis: mechanical and SQheparin  Ron Catheter: Not present  Lines: None       Drains: PRESENT         - 1 Closed/Suction Drain(s)    - 1 Ureteral Drain/Stent(s)   Cardiac  "Monitoring: ACTIVE order. Indication: Bradycardias (48 hours)  Code Status: Full Code      Clinically Significant Risk Factors          # Hyperchloremia: Highest Cl = 109 mmol/L in last 2 days, will monitor as appropriate          # Hypoalbuminemia: Lowest albumin = 2.4 g/dL at 5/29/2025  3:15 PM, will monitor as appropriate       # Hypertension: Noted on problem list            # Overweight: Estimated body mass index is 28.45 kg/m  as calculated from the following:    Height as of this encounter: 1.702 m (5' 7\").    Weight as of this encounter: 82.4 kg (181 lb 10.5 oz).        # Financial/Environmental Concerns: none         Social Drivers of Health   Tobacco Use: Medium Risk (5/27/2025)    Patient History     Smoking Tobacco Use: Former     Smokeless Tobacco Use: Never   Physical Activity: Insufficiently Active (11/9/2023)    Received from [employer]    Exercise Vital Sign     Days of Exercise per Week: 5 days     Minutes of Exercise per Session: 10 min         Disposition Plan     Pending tolerance of advancement of diet and return of bowel function      Patient was seen and discussed with colorectal fellow, Dr. Montano, who discussed with colorectal staff, Dr. Shannan Orozco, PA-C  Colon and Rectal Surgery     Non-urgent messages: Securely message with [employer] (more info)  Text page via [employer] Paging/Directory     ______________________________________________________________________    Interval History   Doing ok.  Little bit of heartburn.  Tried a little food.  Passed a little gas.  Not much stool.     Physical Exam   Vital Signs: Temp: 98.1  F (36.7  C) Temp src: Oral BP: (!) 143/87 Pulse: 96   Resp: 16 SpO2: 97 % O2 Device: Nasal cannula Oxygen Delivery: 1 LPM  Weight: 181 lbs 10.54 oz  Intake/Output Summary (Last 24 hours) at 5/30/2025 0706  Last data filed at 5/30/2025 0700  Gross per 24 hour   Intake 2990.79 ml   Output 1360 ml   Net 1630.79 ml     General Appearance: Alert and oriented, no " acute distress  Respiratory: nonlabored breathing on room air   Cardiovascular: regular rate   GI: soft, nondistended, appropriately tender to palpation around ostomy in LLQ, ostomy pink and well perfused with gas output and bowel sweat in bag, incision clean/dry/intact, PATY somewhat murky serosanguinous output   Skin: no rashes    Data     I have personally reviewed the following data over the past 24 hrs:    9.5  \   7.3 (L)   / 199     142 109 (H) 46.5 (H) /  181 (H)   3.8 23 1.41 (H) \       Imaging results reviewed over the past 24 hrs:   No results found for this or any previous visit (from the past 24 hours).

## 2025-06-03 NOTE — PROGRESS NOTES
[facility]    Progress Note - Medicine Service, JOVANNY TEAM 3       Date of Admission:  5/28/2025    Assessment & Plan   Luz Thompson is a 75 year old female who was admitted on 5/28/2025 with past medical history of atrial fibrillation, DVT on eliquis, CVA, CKD III, HFpEF (TTE 3/9/25 EF 60-65%), T2DM, biliary cirrhosis s/p liver transplant in 2002, EBV, HTN, HLD, Sjogren's syndrome, Basal Cell Carincoma s/p MOHS on 4/8/25, thyroid cancer s/p thyroidectomy in 2010, Sjogren's syndrome, and diverticulitis with recent admission from 4/14-4/23 for ESBL UTI & E. Faecalis bacteremia related to sigmoid microperforation (treated with IV ertapenem) who is admitted for abdominal pain and hypotension, found to have smoldering diverticulitis with 4.1cm abscess s/p open sigmoidectomy and end colostomy on 5/30/25, with Psuedomonas positive blood cultures drawn 5/28.  Ongoing IV antibiotics due to bacteremia (GPC and VRE) and weaning off stress dose steroids.    Today:  - Bcx: GPC and VRE  - Started Ceftolozane-Tazobactam per ID rec  - Weaning IV steroids  - Midline Removal, PIV in place     # Diverticulitis c/b abscess s/p open sigmoidectomy with end colostomy 5/30/25  # Sepsis 2/2 Klebsiella pneumoniae and Pseudomonas aeruginosa bacteremia 5/28, likely of intra-abdominal origin   # Hypotension 2/2 sepsis  Patient now hypertensive, after coming off pressors on 5/30. Currently with ongoing IV steroid taper, will plan to stop continuous maintenance fluids. Will hold PTA antihypertensives while titrating and resume when able. Plan to continue meropenem for psuedomonas coverage and switch vancomycin to linezolid iso DERREK for coverage of previous enterococcus positive cultures.  - CRS consulted   - ADAT  - subcutaneous heparin for DVT ppx, continue to hold eliquis for now  - Hx of liver transplant - immunosuppression per transplant immunology team   - High dose Vitamin A x30 days  post-op for wound healing support in setting of immunosuppression  - senokot BID  - Continue WOC ostomy cares   - Continue PATY drain   - Encourage out of bed and ambulation, incentive spirometer use   - Transplant ID signed off, reconsulted for recommendations for abx duration given new VRE bacteremia  - meropenem (5/31- 6/3) for pseudomonas coverage  - linezolid (6/1 -6/3)  for enterococcal bacteremia  - Ceftolozane-Tazobactam (6/3- ), will need to determine duration given ongoing bacteremia (has not cleared yet)  - Pain regimen:   - Scheduled: Robaxin QID, tylenol 975mg BID, gabapentin 300mg QHS, Lidocaine patches daily, Diclofenac QID  -  PRN: PO oxycodone 5mg  - Blood cultures drawn 5/28 positive for Klebsiella and Pseudomonas  - Bcx (5/31): E Faecium VRE  - Bcx (6/2): GPC in pairs and chains   - Decrease IV hydrocortisone 25mg q6h (50mg q6h previously), plan to decrease to 12.5 if tolerated then prednisone 10 mg   - If decreasing back to IS dosing, would pause on pred 10 until tac level therapeutic (goal 3-5)   - hold PTA amlodipine while tapering fluids and IV steroids    # DERREK on CKD3 - likely ATN   # Bilateral stent placement per urology 5/29  # ATN  DERREK not resolving despite fluid resuscitation, suspect ATN in the setting of hypotension, shock, nephrotoxic meds   - Baseline creatinine 1.3-1.6  - external catheter   - PTA estradiol vaginal  cream  - consider repeat urine studies to confirm dx, but given improvement will hold off   - Consider diuresis in AM to encourage increased urine output in setting of likely ATN.     # Primary biliary cirrhosis s/p liver transplant 2002  # EBV +  # Reactive Leukocytosis   # Sjogren's syndrome  - Transplant Hepatology consulted  - No sirolimus  - If decreasing back to IS dosing, would start on pred 10 until tac level therapeutic (goal 3-5)   - Restarted tac 0.5 mg po BID   - ordered suspension formulation instead of capsule due to gelatin allergy  - CMP in AM  - PTA  Nystatin QID  - PTA Ursodiol     # Acute post-op pain  # high risk delirium   - delirium precaution   - Monitor neurological status. Delirium preventions and precautions.   - pain regimen as above  - PTA sertraline  - melatonin Q HS  - PRN narcan    # Protein calorie deficit malnutrition    - Nutrition consulted  - Supplements: Gelatin Plus TID with meals + PRN per patient request  - Thera-Vit 1 tablet daily   - Vitamin A level (ordered)    # Post operative ventilatory support, resolved  # Acute hypoxic respiratory support   Currently on 1L NC.  - Supplemental oxygen to keep saturation above 92 %.  - PRN albuterol     # Paroxysmal A-fib on apixaban   # Hypertension  # HFpEF (TTE 3/9/25 EF 60-65%)   # history of CVA  # Vasoplagia  - Monitor hemodynamic status.  - Levophed off since 0400 5/31  - MAP goal >65  - PTA ezetimibe  - holding PTA amlodipine 5mg BID  - HOLD PTA apixaban 2.5mg BID, CRS to advise when able to restart  - started SQH 5/30   - HOLDing metoprolol tartrate 12.5 mg BID  - holding hydralazine, when reordering would prefer 2 25 mg tabs   - SBP >100 or MAP >65  - weaning steroids as above     # Type 2 diabetes  # Stress hyperglycemia    - MDSSI  - Goal to keep BG< 180 for optimal wound healing   - Glucose AC/HS  - hypoglycemic protocol  - hydrocortisone as above    # Thyroid cancer status post thyroidectomy 2010  - PTA calcitriol 0.25mcg  - PTA levothyroxine 175mcg    # Acute blood loss anemia    # Anemia of critical illness  # coagulopathy due to cirrhosis and surgical blood loss    # thrombocytopenia   # anemia of critical illness   - Transfuse if hgb <7.0 or signs/symptoms of hypoperfusion. Monitor and trend.   - HOLD PTA apixaban 2.5mg BID  - PTA Ferous sulfate, folic acid     # Weakness and deconditioning of critical illness   - Physical and occupational therapy consult   - up with assist, fall precaution     # Basal cell carcinoma status post Mohs April 2025        Diet: Snacks/Supplements Adult:  "Other; PRN per pt request; With Meals  Snacks/Supplements Adult: Other; Offer pt Gelatein Plus or 20 TID with meals - please ask what flavor she prefers with each meal; With Meals  Full Liquid Diet  Calorie Counts    DVT Prophylaxis: Heparin SQ  Ron Catheter: Not present  Fluids: PO  Lines: PIV      Cardiac Monitoring: None  Code Status: Full Code      Clinically Significant Risk Factors          # Hyperchloremia: Highest Cl = 110 mmol/L in last 2 days, will monitor as appropriate          # Hypoalbuminemia: Lowest albumin = 2.4 g/dL at 5/29/2025  3:15 PM, will monitor as appropriate    # Acute Kidney Injury, unspecified: based on a >150% or 0.3 mg/dL increase in last creatinine compared to past 90 day average, will monitor renal function  # Hypertension: Noted on problem list            # Overweight: Estimated body mass index is 28.45 kg/m  as calculated from the following:    Height as of this encounter: 1.702 m (5' 7\").    Weight as of this encounter: 82.4 kg (181 lb 10.5 oz)., PRESENT ON ADMISSION     # Financial/Environmental Concerns: none         Social Drivers of Health   Tobacco Use: Medium Risk (5/27/2025)    Patient History     Smoking Tobacco Use: Former     Smokeless Tobacco Use: Never   Physical Activity: Insufficiently Active (11/9/2023)    Received from [employer]    Exercise Vital Sign     Days of Exercise per Week: 5 days     Minutes of Exercise per Session: 10 min        The patient's care was discussed with the Attending Physician, Dr. Russo.    Negrito Christensen MD   Internal Medicine PGY-1  Medicine Service, [address]  Securely message with [employer] (more info)  Text page via [employer] Paging/Directory   See signed in provider for up to date coverage information  ______________________________________________________________________    Interval History   Night float paged with concern for bradycardia, however rates in the 80s and EKG showing " sinus rhythm with occasional PVCs.  Asymptomatic at the time.  Patient states that she is doing well overall without chest pain or palpitation.  No new abdominal pain at this time without nausea or vomiting.  Tolerating p.o. and no concerns with ostomy.       Physical Exam   Vital Signs: Temp: 98.3  F (36.8  C) Temp src: Oral BP: (!) 168/89 Pulse: 61   Resp: 10 SpO2: 96 % O2 Device: Nasal cannula Oxygen Delivery: 1 LPM  Weight: 181 lbs 10.54 oz    General Appearance: NAD, resting comfortably in bed, alert and oriented  Respiratory: CTAB, normal work of respiration  Cardiovascular: RRR, no m/r/g  GI: Soft, nontender, nondistended, ostomy with minimal output, PATY drain with serosanguinous output, dressings CDI  MSK/Skin: no rashes on exposed skin, moving all extremities  Other: No focal neurodeficits

## 2025-06-03 NOTE — CONSULTS
Transplant Infectious Diseases Inpatient Consultation      Luz Thompson MRN# [id number]   YOB: 1950 Age: 75 year old   Date of Admission and time: 5/28/2025 12:43 PM     Reason for consult: indwelling lines, infection management           Active Problems and Infectious Diseases Issues:     Polymicrobial bacteremia:  Pseudomonas aeruginosa (blood culture 5/28/25, cleared 5/31/25)  Klebsiella pneumoniae (blood culture 5/28/25, cleared 5/31/25)  Vancomycin-Resistant Enterococcus faecium (VRE) (blood culture 5/31/25, cutlures from 6/3 NGTD)  Post-operative course s/p open sigmoidectomy and end colostomy (5/30/25) for diverticulitis with abscess.  Immunocompromised state s/p liver transplant (2002) on tacrolimus.  History of ESBL UTI and E. faecalis bacteremia (April 2025).  Midline catheter in situ.        Impression and Recommendations:     Stop meropenem and flagyl.  Start ceftolozane-tazobactam for CR-PsA and Klebsiella. Ask pharmacy to help with dosing.  Continue linezolid for VRE.  Obtain TTE given prolonged bacteremia.  Continue daily blood cultures.  Obtain peripheral access. Remove the midline, aim for a 48-hour line holiday, then replace. (Note: High-risk situation despite midline not being a central line).  Will need to arrange close outpatient follow-up with Dr. Lundberg.    Transplant infectious diseases will continue to follow.      Discussed with the attending on service, Dr. Isidro.   Jose Ramon Lee MD  Transplant Infectious Diseases Fellow         HPI & Synopsis of Clinical Presentation and Course   Transplant Summary  Transplants:  5/22/2002 (Liver); POD  8413.  Coordinator Angelika Frausto  Reason for Transplantation: Biliary cirrhosis  Current Immunosuppression: Tacrolimus suspension 0.5 mg PO BID. (Prednisone 9mg daily currently held).    Luz Thompson is a 75-year-old woman with a history of biliary cirrhosis s/p liver transplant in 2002, EBV, Sjogren's syndrome,  and recent basal cell carcinoma s/p Mohs surgery (4/8/25). She had a prior admission from 3/8-43/15 for Enterococcus bacteremia 2/2 ?GI translocation and Klebsiella pyelonephritis. She was again admitted from 4/14/25-4/23/25 for ESBL K oxygoca and Proteus UTI and found to have a sigmoid microperforation, which was treated with IV ertapenem. CT A/P from 5/8 found to have increasing size of her sigmoid fluid collection so transitioned to tigecycline for broadened Enterococcus coverage.     She was re-admitted on 5/28/2025 with abdominal pain 2/2 diverticulitis and her ongoing 4.1 cm abscess/microperforation. She underwent an open sigmoidectomy and end colostomy on 5/30/25. Her kayla-operative course has been complicated by polymicrobial bacteremia. Blood cultures from 5/28/25 grew MDR Pseudomonas aeruginosa and Klebsiella pneumoniae; this seem to have been a transient bacteremia as it cleared by 5/31/25. Subsequent blood cultures on 5/31/25 grew Vancomycin-Resistant Enterococcus faecium (VRE), which again grew on 6/2/25.     Current antimicrobial therapy includes linezolid (started 6/2/25 for VRE) and meropenem (started 5/31/25). The patient developed perioral numbness attributed to tigecycline during a previous admission and wishes to avoid this medication.    She remains afebrile, and her WBC has normalized from 20K to 9.5K. She is on oxygen but reports no acute concerns. She follows with Dr. Lundberg as an outpatient.               Enterococcus faecium VRE       MARYJANE     Ampicillin >=32 ug/mL Resistant     Daptomycin 3 ug/mL Susceptible Dose Dependent     Gentamicin Synergy Susceptible... Susceptible 1     Linezolid 2 ug/mL Susceptible     Vancomycin >=32 ug/mL Resistant                         Klebsiella pneumoniae Pseudomonas aeruginosa      MARYJANE MARYJANE     Ampicillin  Resistant 1       Ampicillin/ Sulbactam >=32 ug/mL Resistant       Cefepime <=0.12 ug/mL Susceptible 16 ug/mL Intermediate     Ceftazidime <=0.5 ug/mL  Susceptible >=32 ug/mL Resistant     Ceftazidime Avibactam   <=2 ug/mL Susceptible     Ceftolozane/Tazobactam   <=2 ug/mL Susceptible     Ceftriaxone <=0.25 ug/mL Susceptible       Ciprofloxacin 0.5 ug/mL Intermediate 0.5 ug/mL Susceptible     Colistin   <=2 ug/mL Intermediate     Gentamicin <=1 ug/mL Susceptible       Levofloxacin 1 ug/mL Intermediate 2 ug/mL Intermediate     Meropenem <=0.25 ug/mL Susceptible 32 ug/mL Resistant     Piperacillin/Tazobactam 16 ug/mL Susceptible Dose Dependent >=128 ug/mL Resistant     Trimethoprim/Sulfamethoxazole <=1/19 ug/mL Susceptible              Off note, developed perioral numbness on tigecycline (added as med reaction), and she prefers to avoid this medication. She follows with Dr. Lundberg in outpatient. Remains afebrile, WBC normalized from 20 to 9.5. Remains on linezolid (since 6/2) and meropenem (since 5/31).     At this point, we will recommend stopping meropenem and starting cefto/tazo, given her PsA is resistant to meropenem. Can continue linezolid. Duration TBD based on ongoing bacteremia,      Transplant Checklist  - QTc interval: 435  - Pneumocystis prophylaxis: none   - Bacterial prophylaxis: none   - Fungal prophylaxis: none   - Serostatus & viral prophylaxis: none   - Immunization status: Up to date  - Gamma globulin status:   Recent Labs   Lab Test 08/05/19  1552   IGG 1,110                 Review of Systems:      As mentioned in the HPI otherwise negative by reviewing constitutional symptoms, central and peripheral neurological systems, respiratory system, cardiac system, GI system,  system, musculoskeletal, skin, allergy, and lymphatics.              Immunizations:     Immunization History   Administered Date(s) Administered    COVID-19 12+ (Pfizer) 10/17/2024    COVID-19 Bivalent 12+ (Pfizer) 02/21/2021, 03/15/2021    COVID-19 MONOVALENT 12+ (Pfizer) 02/21/2021, 03/15/2021, 03/29/2021, 10/09/2021    COVID-19 Vaccine, Unspecified 02/08/2021, 03/15/2021,  03/29/2021    Flu 65+ (Fluad) 12/21/2019    Flu, Unspecified 11/07/2022    [id number] Novel Flu P-free 11/10/2009    HEPA 07/01/2001    HepB, Unspecified 07/01/2001    Influenza (High Dose) Trivalent,PF (Fluzone) 09/25/2015, 10/30/2016, 10/23/2017, 10/24/2024    Influenza (IIV3) PF 11/10/2004, 09/29/2007, 10/01/2010, 09/27/2012, 10/29/2012, 09/30/2013, 09/01/2016    Influenza Vaccine 18-64 (Flublok) 10/20/2018    Influenza Vaccine 65+ (Fluzone HD) 11/03/2020, 03/14/2022, 10/20/2022    Influenza Vaccine, 6+MO IM (QUADRIVALENT W/PRESERVATIVES) 10/01/2020    Pneumo Conj 13-V (2010&after) 02/26/2014, 01/01/2016    Pneumococcal 23 valent 12/01/2007, 01/01/2014, 05/25/2016    TD,PF 7+ (Tenivac) 01/01/2007    TDAP (Adacel,Boostrix) 01/01/2014    TDAP Vaccine (Adacel) 09/17/2007, 02/26/2014            Allergies:     Allergies   Allergen Reactions    Fluconazole Hives and Itching     Full body hives  **Intradermal skin testing negative**  [See intradermal skin testing results from 8/2/2019]    Mycophenolate Diarrhea and Nausea and Vomiting     Patient stated it was chronic and lasted months      Penicillins Anaphylaxis, Hives, Itching and Rash     **Intradermal skin testing negative**  [See intradermal skin testing results from 8/2/2019]      Simvastatin Muscle Pain (Myalgia)     severe  Other reaction(s): Myalgia caused by statin    Methotrexate Other (See Comments)     Other reaction(s): Sore  Sores in mouth, esophagus, and stomach.       Morphine And Codeine Itching and Other (See Comments)     Psych disturbance  Other reaction(s): Confusion, Mood alteration    Quinolones Anxiety, Dizziness, Headache, Other (See Comments), Palpitations and Unknown     Other reaction(s): Hyperactive behavior, Lightheadedness, Mood alteration    Dizzy, light headed    Dizziness, shaky, and jumpy    Capsules, Empty Gelatin [Gelatin]     Carvedilol Diarrhea     Per pt - severe diarrhea and LE swelling  + tolerating Toprol    Lansoprazole  Diarrhea    Strawberry Extract     Azithromycin Itching     [See intradermal skin testing results from 8/2/2019]    Bactrim [Sulfamethoxazole-Trimethoprim] Other (See Comments)     Numb mouth, tingling lips (treated with anti-histamines)    Cephalosporins Itching     [See intradermal skin testing results from 8/2/2019]    Ciprofloxacin Hcl Other (See Comments) and Dizziness     Insomnia, mood lability, Irregular heart beat         Doxycycline Itching and Unknown     [See intradermal skin testing results from 8/2/2019]    Lisinopril Cough    Omeprazole Itching    Tolectin [Nsaids] Rash    Tolmetin Rash and Itching    Tramadol Rash, Hives and Itching             Medications:       Current Facility-Administered Medications:     acetaminophen (TYLENOL) tablet 975 mg, 975 mg, Oral, BID, Jah Bettencourt PA-C, 975 mg at 06/02/25 1011    albuterol (PROVENTIL) neb solution 2.5 mg, 2.5 mg, Nebulization, Q4H PRN, Jah Bettencourt PA-C    [Held by provider] amLODIPine (NORVASC) tablet 5 mg, 5 mg, Oral, Daily, Tess Rodriguez MD, 5 mg at 06/01/25 0741    [Held by provider] apixaban ANTICOAGULANT (ELIQUIS) tablet 2.5 mg, 2.5 mg, Oral, BID, Jah Bettencourt PA-C    calcitRIOL (ROCALTROL) capsule 0.25 mcg, 0.25 mcg, Oral, Daily, Jah Bettencourt PA-C, 0.25 mcg at 06/02/25 1011    calcium carbonate (TUMS) chewable tablet 1,000 mg, 1,000 mg, Oral, Q4H PRN, Jha Bettencourt PA-C, 500 mg at 06/03/25 0304    glucose gel 15-30 g, 15-30 g, Oral, Q15 Min PRN **OR** dextrose 50 % injection 25-50 mL, 25-50 mL, Intravenous, Q15 Min PRN **OR** glucagon injection 1 mg, 1 mg, Subcutaneous, Q15 Min PRN, Tess Rodriguez MD    diclofenac (VOLTAREN) 1 % topical gel 4 g, 4 g, Topical, 4x Daily, Jah Bettencourt PA-C, 4 g at 06/02/25 1010    diphenhydrAMINE (BENADRYL) elixir 25 mg, 25 mg, Oral or Feeding Tube, Q6H PRN, Aleja Antunez MD, 25 mg at 06/01/25 2227    estradiol (ESTRACE) cream 2 g, 2 g, Vaginal, Once per day on Monday Wednesday  Friday, Jah Bettencourt PA-C, 2 g at 05/30/25 2132    ezetimibe (ZETIA) tablet 10 mg, 10 mg, Oral, At Bedtime, aJh Bettencourt PA-C, 10 mg at 06/02/25 2148    ferrous sulfate (FEROSUL) tablet 325 mg, 325 mg, Oral, At Bedtime, Jah Bettencourt PA-C    folic acid (FOLVITE) tablet 1,000 mcg, 1,000 mcg, Oral, At Bedtime, Jah Bettencourt PA-C, 1,000 mcg at 06/02/25 2149    gabapentin (NEURONTIN) solution 300 mg, 300 mg, Oral, At Bedtime, Jah Bettencourt PA-C, 300 mg at 06/02/25 2148    heparin ANTICOAGULANT injection 5,000 Units, 5,000 Units, Subcutaneous, Q8H, Josefina Perea DO, 5,000 Units at 06/03/25 0601    [Held by provider] hydrALAZINE (APRESOLINE) tablet 50 mg, 50 mg, Oral, TID, Jah Bettencourt PA-C    hydrocortisone sodium succinate PF (solu-CORTEF) injection 50 mg, 50 mg, Intravenous, Q6H, Maribel Landin MD, 50 mg at 06/03/25 0602    insulin aspart (NovoLOG) injection (RAPID ACTING), 1-7 Units, Subcutaneous, TID RUBÉN, Tess Rodriguez MD, 1 Units at 06/03/25 0859    insulin aspart (NovoLOG) injection (RAPID ACTING), 1-5 Units, Subcutaneous, At Bedtime, Tess Rodriguez MD, 1 Units at 06/02/25 2216    levothyroxine (SYNTHROID/LEVOTHROID) tablet 175 mcg, 175 mcg, Oral, QAM AC, Jah Bettencourt PA-C, 175 mcg at 06/03/25 0827    Lidocaine (LIDOCARE) 4 % Patch 2 patch, 2 patch, Transdermal, Q24H, Olinda PereaaDO, 2 patch at 06/01/25 0740    lidocaine (LMX4) cream, , Topical, Q1H PRN, Jah Bettencourt PA-C    lidocaine 1 % 0.1-1 mL, 0.1-1 mL, Other, Q1H PRN, Jah Bettencourt, PA-C    linezolid (ZYVOX) tablet 600 mg, 600 mg, Oral, Q12H [facility] (08/20), Maribel Landin MD, 600 mg at 06/02/25 2148    melatonin tablet 1 mg, 1 mg, Oral, At Bedtime, Eliazar, Josefina,     meropenem (MERREM) 500 mg vial to attach to  mL bag for ADULTS or 25 mL bag for PEDS, 500 mg, Intravenous, Q12H, Norah Jaquez CNP, 500 mg at 06/03/25 0007    methocarbamol (ROBAXIN) tablet 500 mg, 500 mg, Oral, 4x  Daily PRN, Arlene Tang MD    [Held by provider] metoprolol tartrate (LOPRESSOR) half-tab 12.5 mg, 12.5 mg, Oral, BID, Datario, Josefina, DO, 12.5 mg at 05/31/25 0757    multivitamin, therapeutic (THERA-VIT) tablet 1 tablet, 1 tablet, Oral, Daily, Datario, Josefina, DO, 1 tablet at 06/02/25 1011    naloxone (NARCAN) injection 0.2 mg, 0.2 mg, Intravenous, Q2 Min PRN **OR** naloxone (NARCAN) injection 0.4 mg, 0.4 mg, Intravenous, Q2 Min PRN **OR** naloxone (NARCAN) injection 0.2 mg, 0.2 mg, Intramuscular, Q2 Min PRN **OR** naloxone (NARCAN) injection 0.4 mg, 0.4 mg, Intramuscular, Q2 Min PRN, Aleja Antunez MD    Nutrisource Fiber PO packet 1 each, 1 packet, Oral, Daily, Jah Bettencourt PA-C, 1 each at 06/02/25 1723    nystatin (MYCOSTATIN) suspension 1,000,000 Units, 1,000,000 Units, Oral, 4x Daily, Jah Bettencourt PA-C, 1,000,000 Units at 06/02/25 2148    omega-3 acid ethyl esters (LOVAZA) capsule 1 g, 1 g, Oral, BID, Tess Rodriguez MD, 1 g at 06/02/25 1010    ondansetron (ZOFRAN) injection 4 mg, 4 mg, Intravenous, Q6H PRN, Jah Bettencourt PA-C    oxyCODONE (ROXICODONE) tablet 5 mg, 5 mg, Oral, Q3H PRN, 5 mg at 06/01/25 1549 **OR** [DISCONTINUED] oxyCODONE IR (ROXICODONE) tablet 10 mg, 10 mg, Oral, Q4H PRN, Datario, Josefina, DO, 10 mg at 05/30/25 1234    potassium & sodium phosphates (NEUTRA-PHOS) Packet 1 packet, 1 packet, Oral or Feeding Tube, Q4H, Arlene Tang MD, 1 packet at 06/03/25 0857    [Held by provider] predniSONE (DELTASONE) half-tab 9 mg, 9 mg, Oral, Daily, Luzma Osman, APRN CNP    sennosides (SENOKOT) tablet 2 tablet, 2 tablet, Oral or Feeding Tube, BID, Norah Jaquez, CNP, 2 tablet at 06/02/25 2148    sertraline (ZOLOFT) tablet 50 mg, 50 mg, Oral, Daily, Jah Bettencourt PA-C, 50 mg at 06/02/25 1011    sodium chloride (PF) 0.9% PF flush 3 mL, 3 mL, Intracatheter, Q8H LUZMARIA, Jah Bettencourt PA-C, 3 mL at 06/02/25 2204    sodium chloride (PF) 0.9% PF flush 3 mL, 3 mL,  "Intracatheter, q1 min prn, Jah Bettencourt PA-C    [Held by provider] tacrolimus (GENERIC EQUIVALENT) capsule 0.5 mg, 0.5 mg, Oral, BID IS, Sindhu Brown MD    tacrolimus (GENERIC) suspension 0.5 mg, 0.5 mg, Oral, BID IS, Maribel Landin MD, 0.5 mg at 06/02/25 1719    ursodiol (ACTIGALL) tablet 250 mg, 250 mg, Oral, BID, Jah Bettencourt PA-C, 250 mg at 06/02/25 2149    vitamin A 3 MG (70145 UNITS) capsule 20,000 Units, 20,000 Units, Oral, Daily, Al Campbell MD, 20,000 Units at 06/02/25 1009   No current outpatient medications on file.        Physical Exam     Physical Exam     Vital signs:  BP (!) 143/87 (BP Location: Left arm, Patient Position: Semi-Mckinley's)   Pulse 96   Temp 98.1  F (36.7  C) (Oral)   Resp 16   Ht 1.702 m (5' 7\")   Wt 82.4 kg (181 lb 10.5 oz)   LMP 06/01/1988 (Approximate)   SpO2 97%   BMI 28.45 kg/m      General: Appears well, afebrile. No acute distress.  Exam: Clinically unremarkable. Reports no new concerns.    Data     Data   Laboratory data and imaging listed below was reviewed prior to this encounter.                      Jose Ramon Lee MD  [place]  Contact information available via [facility] Paging/Directory     06/03/2025   "

## 2025-06-03 NOTE — PROGRESS NOTES
Calorie Count  Intake recorded for: 6/2  Total Kcals: 350 Total Protein: 20g  Kcals from Hospital Food: 350  Protein: 20g  Kcals from Outside Food (average):0 Protein: 0g  # Meals Ordered from Kitchen: 2 meals   # Meals Recorded: no food intake recorded.   # Supplements Recorded: 100% 1 Ensure Plus High Protein

## 2025-06-03 NOTE — CONSULTS
[facility] Nurse Inpatient Assessment     Consulted for: new end colostomy    Summary: 5/30 - Pt's daughter Gloria at bedside for pouch change demo, would like to be present for education    6/3 - daughter not here - stool present in pouch    [facility] nurse follow-up plan: daily    Patient History (according to provider note(s):      75 year old female with history of liver transplant admitted for smoldering diverticulitis with 4.1cm abscess s/p open sigmoidectomy and end colostomy on 5/29/25,     Assessment:      Areas visualized during today's visit: Focused: Abdomen      6/2 LLQ    Assessment of new end Colostomy:  Diagnosis Pertinent to Stoma: Diverticulitis with perforation      Surgery Date: 5/29/25  Surgeon: Al Campbell MD       Hospital: [facility]  Pouching system in place on assessment today: [place] two piece, cut to fit, soft convex, barrier ring, and skin prep   Pouch last changed/wear time: 1 days   Reason for pouch change today: pouch not changed today  Effectiveness of current pouching/ supply plan: will try one piece next change and possibly Coloplast for different closure   Change made with ostomy management today: No  Pouching system placed today: [place] two piece, cut to fit, soft convex, barrier ring, and skin prep   Supplies: ordered one piece convex pouches and discussed with patient   Last photo: 6/2/25  Stoma location: LLQ  Stoma size: 32 mm   Stoma appearance: not assessed   Mucocutaneous junction: not assessed  Peristomal complication(s): not assessed   Output: watery brown stool  Output volume emptied during visit: did not empty (soft/liquid stool in pouch)  Abdominal assessment: Soft  Surgical site(s): dressing dry and intact  NG still in place? No  Pain: denies  Is patient still on a PCA? No    Ostomy education assessment:  Participant of teaching session today: patient   Education completed today: Pouching system assessment  and  "Odor/flatus management   Educational materials/methods: Verbal, Demonstration, FOD [place] education hand out, Product sample, Addressed patient/caregiver questions/concerns, and Left packet of information at bedside   Education still needed: Pouch change return demonstration, Pouch emptying demonstration, Pouch emptying return demonstration, Intake and output recording, Fluid and electrolyte balance , Importance of hydration, When to seek medical attention, Low fiber diet , Hernia prevention, Lifestyle adjustments , and Discharge instructions  Learning Comprehension:   Psychosocial assessment: Pt says she wants to be able to change pouch after discharge - wants to return to independent living apartment with some assistance (was in TCU prior to this admission). Daughter can assist after discharge if needed, wants to be present for teaching  Patient readiness for education today: attentive and active participation, states up all night, too tired for education   Following today's visit: patient  is able to demonstrate;         1. How to empty their pouch? No, Demo provided , and Needs additional practice         2. How to change their pouch? No and Needs additional practice         3. How to read and record intake and output correctly? No  Preparation for discharge completed: No discharge preparation started yet  Preparation for discharge still needed: No discharge preparation started yet  Pt support system on discharge: Daughter Gloria  [facility] recommend home care? By WOC RN if possible  Face to face time: 45 minutes    Treatment Plan:     LLQ Colostomy pouching plan:   Pouching system: ostomy supplies pouches: Marina 57 FECAL ([id number]) ostomy supplies barrier: [place] 57mm SOFT CONVEX ([id number])  Accessories used: [facility] ostomy accessories: 2\" Cera Barrier Ring ([id number]) and Cavilon no sting barrier film ([id number])   Frequency of pouch changes: Twice weekly and PRN leakage  WO follow up plan: Daily Monday-Friday (as " able)  Bedside RN interventions: Change pouch PRN if leaking using the supplies above, Empty pouch when 1/3 to 1/2 full, ensure to clean pouch outlet after emptying to prevent odor, Notify [facility] for ongoing pouch leakage, Document stoma appearance and output volume, color, and consistency every shift, Encourage patient to empty pouch with assist, and Assist patient to measure and record output     Orders: Reviewed      DATA:     Current support surface: Standard    Containment of urine/stool: Suction based external urinary catheter  and Colostomy pouch  BMI: Body mass index is 28.28 kg/m .   Active diet order: Orders Placed This Encounter      Clear Liquid Diet     Output: I/O last 3 completed shifts:  In: 1756.64 [P.O.:270; I.V.:1436.64; IV Piggyback:50]  Out: 1680 [Urine:1250; Drains:430]     Labs:   Recent Labs   Lab 05/30/25  0412 05/29/25  1515 05/29/25  1051 05/29/25  0546   ALBUMIN  --  2.4*  --   --    HGB 8.6* 8.5*   < > 7.6*   INR  --   --   --  1.49*   WBC 20.2* 20.2*  --  9.1   A1C  --  6.3*  --   --     < > = values in this interval not displayed.     Pressure injury risk assessment:   Sensory Perception: 2-->very limited  Moisture: 3-->occasionally moist  Activity: 1-->bedfast  Mobility: 2-->very limited  Nutrition: 2-->probably inadequate  Friction and Shear: 2-->potential problem  Shashank Score: 12    Pager no longer is use, please contact through [employer] group: [facility] Nurse [place]   Dept. Office Number: [phone]    Katelyn Parikh RN CWOCN

## 2025-06-03 NOTE — PLAN OF CARE
Goal Outcome Evaluation:      Plan of Care Reviewed With: patient    Overall Patient Progress: no change    Outcome Evaluation: 1900-0730 A&OX4.  VSS on RA & 1L while sleeping, pt's pulse ox read as low as 29 overnight.  Ana Lewis, notified & ordered tele & EKG order released.  EKG & tele reading sinus rhythm with PVC's HR 70-80's.  Ana also notified of positive BCx from 6/2 0904 gram+ cocci in pairs & chains.  Pt on IV meropenum & PO linezolid abx.  R midline TKO.  PATY drain had 100 ml OP.  No OP from colostomy, took stool softeners, BS active.  Purewick in place good OP.  Pt c/o heartburn overnight, relief after PRN tums, otherwise no pain.  Midline abd incision open to air & approximated.  Preventative mepilex on coccyx changed.  Refused repositioning overnight, heels elevated.  Electrolyte rechecks in AM.   & 214. Continue to monitor & follow POC.

## 2025-06-03 NOTE — PROGRESS NOTES
"Care Management Follow Up    Length of Stay (days): 6    Expected Discharge Date: 06/08/2025     Concerns to be Addressed: discharge planning     Patient plan of care discussed at interdisciplinary rounds: Yes    Anticipated Discharge Disposition: Home vs TCU     Anticipated Discharge Services: [facility]     Anticipated Discharge DME: None    Patient/family educated on Medicare website which has current facility and service quality ratings:  NA  Education Provided on the Discharge Plan:    Patient/Family in Agreement with the Plan:      Referrals Placed by CM/SW:  home care   Private pay costs discussed: Not applicable    Discussed  Partnership in Safe Discharge Planning  document with patient/family: No     Handoff Completed: Yes, [facility] PCP: Internal handoff referral completed    Additional Information:    Per chart review and per round, pt will likely be here till end of the week.    Therapy recs TCU. The pt has a history of against going to TCU, would strongly want to go home with HC services. She lives in [facility]. Per chart review: \"where she receives 2 meals a day and daily well-being checks via an \"I'm Ok\" button. She also has a button to push if she falls in the home and a pager if she falls outside of the home. Her granddaughter comes twice a month to clean and she receives frequent check in's and support for any needs from her children Gloria and Luciano who both live approximately 30 min away.\" Continue to monitor the pt's progression and plan safe dispo.    ADD: provider updated that anticipate the pt will be discharged with IV ABX. Since the pt's insurance doesn't have coverage for home IV ABX previously, sending benefit check both to [employer] and [facility] for self-pay quote.    Next Steps:     [] Monitor for [employer] and [employer] benefit checks  [] IMM  [] IHO completed for TCU discharge, if home with HC discharge, needs new IHO.    Discharge Resource:    [facility]   P: [phone]  F: [phone]   LSN: Faviola Souza and Yamilet Jim (M-F)    [facility]   Roz Josue RN Clinical    Email: [email or web address]   M: [phone]  O: [phone]  F: [phone]     [facility]  P: [phone]  F: [phone]  Services: RN (wound cares, PICC cares/labs), PT, OT, HHA  SOC: 24-48 hours after discharge    Yola Caro RN    7C RN Coordinator  Phone: [phone]  6/3/2025  Units: 7C Med Surg 7401 thru 7418 RNCC     Social Work and Care Management Department   SEARCHABLE in [employer]COM - search CARE COORDINATOR   [place] & [facility] ([phone]) Saturday & Sunday; ([phone]) [facility] Recognized Holidays   Units: 5A Onc 5201 - 5219 RNCC,  5A Onc 5220 thru 5240 RNCC, 5C OFFSERVICE [phone] RNCC & 5C OFF SERVICE 0900-5060 RNCC   Units: 6B Vocera, 6C Card 6401 thru 6420 RNCC, 6C Card 6502 thru 6514 RNCC & 6C Card 6515 thru 6519 RNCC    Units: 7A SOT RNCC Vocera, 7B Med Surg Vocera, & 7C Med Surg 7502 thru 7521 RNCC   Units: 6A Vocera & 4A CVICU Vocera, 4C MICU Vocera, and 4E SICU Vocera     Units: 5 Ortho Vocera & 5 Med Surg Vocera    Units: 6 Med Surg Vocera & 8 Med Surg Vocera

## 2025-06-04 ENCOUNTER — APPOINTMENT (OUTPATIENT)
Dept: PHYSICAL THERAPY | Facility: CLINIC | Age: 75
DRG: 329 | End: 2025-06-04
Payer: MEDICARE

## 2025-06-04 ENCOUNTER — APPOINTMENT (OUTPATIENT)
Dept: CARDIOLOGY | Facility: CLINIC | Age: 75
End: 2025-06-04
Attending: STUDENT IN AN ORGANIZED HEALTH CARE EDUCATION/TRAINING PROGRAM
Payer: MEDICARE

## 2025-06-04 ENCOUNTER — APPOINTMENT (OUTPATIENT)
Dept: CT IMAGING | Facility: CLINIC | Age: 75
DRG: 329 | End: 2025-06-04
Attending: STUDENT IN AN ORGANIZED HEALTH CARE EDUCATION/TRAINING PROGRAM
Payer: MEDICARE

## 2025-06-04 LAB
ALBUMIN SERPL BCG-MCNC: 2.7 G/DL (ref 3.5–5.2)
ALP SERPL-CCNC: 137 U/L (ref 40–150)
ALT SERPL W P-5'-P-CCNC: 20 U/L (ref 0–50)
ANION GAP SERPL CALCULATED.3IONS-SCNC: 11 MMOL/L (ref 7–15)
AST SERPL W P-5'-P-CCNC: 21 U/L (ref 0–45)
ATRIAL RATE - MUSE: 39 BPM
BACTERIA SPEC CULT: ABNORMAL
BASOPHILS # BLD MANUAL: 0 10E3/UL (ref 0–0.2)
BASOPHILS NFR BLD MANUAL: 0 %
BILIRUB SERPL-MCNC: 0.2 MG/DL
BILIRUBIN DIRECT (ROCHE PRO & PURE): 0.12 MG/DL (ref 0–0.45)
BUN SERPL-MCNC: 43.3 MG/DL (ref 8–23)
BURR CELLS BLD QL SMEAR: ABNORMAL
CALCIUM SERPL-MCNC: 8 MG/DL (ref 8.8–10.4)
CHLORIDE SERPL-SCNC: 108 MMOL/L (ref 98–107)
CREAT SERPL-MCNC: 1.39 MG/DL (ref 0.51–0.95)
DIASTOLIC BLOOD PRESSURE - MUSE: NORMAL MMHG
EGFRCR SERPLBLD CKD-EPI 2021: 39 ML/MIN/1.73M2
ELLIPTOCYTES BLD QL SMEAR: SLIGHT
EOSINOPHIL # BLD MANUAL: 0 10E3/UL (ref 0–0.7)
EOSINOPHIL NFR BLD MANUAL: 0 %
ERYTHROCYTE [DISTWIDTH] IN BLOOD BY AUTOMATED COUNT: 18.1 % (ref 10–15)
ERYTHROCYTE [DISTWIDTH] IN BLOOD BY AUTOMATED COUNT: 18.1 % (ref 10–15)
GLUCOSE BLDC GLUCOMTR-MCNC: 204 MG/DL (ref 70–99)
GLUCOSE BLDC GLUCOMTR-MCNC: 211 MG/DL (ref 70–99)
GLUCOSE BLDC GLUCOMTR-MCNC: 220 MG/DL (ref 70–99)
GLUCOSE BLDC GLUCOMTR-MCNC: 225 MG/DL (ref 70–99)
GLUCOSE BLDC GLUCOMTR-MCNC: 226 MG/DL (ref 70–99)
GLUCOSE SERPL-MCNC: 228 MG/DL (ref 70–99)
HCO3 SERPL-SCNC: 22 MMOL/L (ref 22–29)
HCT VFR BLD AUTO: 23.3 % (ref 35–47)
HCT VFR BLD AUTO: 23.3 % (ref 35–47)
HGB BLD-MCNC: 7.4 G/DL (ref 11.7–15.7)
HGB BLD-MCNC: 7.4 G/DL (ref 11.7–15.7)
INTERPRETATION ECG - MUSE: NORMAL
LVEF ECHO: NORMAL
LYMPHOCYTES # BLD MANUAL: 0.4 10E3/UL (ref 0.8–5.3)
LYMPHOCYTES NFR BLD MANUAL: 3 %
MAGNESIUM SERPL-MCNC: 2.2 MG/DL (ref 1.7–2.3)
MCH RBC QN AUTO: 27.4 PG (ref 26.5–33)
MCH RBC QN AUTO: 27.4 PG (ref 26.5–33)
MCHC RBC AUTO-ENTMCNC: 31.8 G/DL (ref 31.5–36.5)
MCHC RBC AUTO-ENTMCNC: 31.8 G/DL (ref 31.5–36.5)
MCV RBC AUTO: 86 FL (ref 78–100)
MCV RBC AUTO: 86 FL (ref 78–100)
METAMYELOCYTES # BLD MANUAL: 0.2 10E3/UL
METAMYELOCYTES NFR BLD MANUAL: 2 %
MONOCYTES # BLD MANUAL: 0.1 10E3/UL (ref 0–1.3)
MONOCYTES NFR BLD MANUAL: 1 %
MYELOCYTES # BLD MANUAL: 0.2 10E3/UL
MYELOCYTES NFR BLD MANUAL: 2 %
NEUTROPHILS # BLD MANUAL: 10.6 10E3/UL (ref 1.6–8.3)
NEUTROPHILS NFR BLD MANUAL: 92 %
NRBC # BLD AUTO: 0.1 10E3/UL
NRBC BLD MANUAL-RTO: 1 %
P AXIS - MUSE: 68 DEGREES
PHOSPHATE SERPL-MCNC: 2.6 MG/DL (ref 2.5–4.5)
PLAT MORPH BLD: ABNORMAL
PLATELET # BLD AUTO: 243 10E3/UL (ref 150–450)
PLATELET # BLD AUTO: 243 10E3/UL (ref 150–450)
POTASSIUM SERPL-SCNC: 4.7 MMOL/L (ref 3.4–5.3)
PR INTERVAL - MUSE: 178 MS
PROT SERPL-MCNC: 5.5 G/DL (ref 6.4–8.3)
QRS DURATION - MUSE: 86 MS
QT - MUSE: 472 MS
QTC - MUSE: 379 MS
R AXIS - MUSE: 42 DEGREES
RBC # BLD AUTO: 2.7 10E6/UL (ref 3.8–5.2)
RBC # BLD AUTO: 2.7 10E6/UL (ref 3.8–5.2)
RBC MORPH BLD: ABNORMAL
SODIUM SERPL-SCNC: 141 MMOL/L (ref 135–145)
SYSTOLIC BLOOD PRESSURE - MUSE: NORMAL MMHG
T AXIS - MUSE: 49 DEGREES
TARGETS BLD QL SMEAR: SLIGHT
VENTRICULAR RATE- MUSE: 39 BPM
WBC # BLD AUTO: 11.5 10E3/UL (ref 4–11)
WBC # BLD AUTO: 11.5 10E3/UL (ref 4–11)

## 2025-06-04 PROCEDURE — G0545 PR INHRENT VISIT TO INPT/OBS W CNFRM/SUSPCT INFCT DIS BY INFCT DIS SPCIALST: HCPCS | Performed by: INTERNAL MEDICINE

## 2025-06-04 PROCEDURE — 84100 ASSAY OF PHOSPHORUS: CPT | Performed by: INTERNAL MEDICINE

## 2025-06-04 PROCEDURE — 83735 ASSAY OF MAGNESIUM: CPT | Performed by: INTERNAL MEDICINE

## 2025-06-04 PROCEDURE — 70450 CT HEAD/BRAIN W/O DYE: CPT | Mod: 26 | Performed by: RADIOLOGY

## 2025-06-04 PROCEDURE — 250N000011 HC RX IP 250 OP 636

## 2025-06-04 PROCEDURE — 250N000012 HC RX MED GY IP 250 OP 636 PS 637: Performed by: NURSE PRACTITIONER

## 2025-06-04 PROCEDURE — G0463 HOSPITAL OUTPT CLINIC VISIT: HCPCS

## 2025-06-04 PROCEDURE — 250N000013 HC RX MED GY IP 250 OP 250 PS 637: Performed by: INTERNAL MEDICINE

## 2025-06-04 PROCEDURE — 85007 BL SMEAR W/DIFF WBC COUNT: CPT | Performed by: INTERNAL MEDICINE

## 2025-06-04 PROCEDURE — 250N000013 HC RX MED GY IP 250 OP 250 PS 637: Performed by: NURSE PRACTITIONER

## 2025-06-04 PROCEDURE — 250N000013 HC RX MED GY IP 250 OP 250 PS 637

## 2025-06-04 PROCEDURE — 250N000013 HC RX MED GY IP 250 OP 250 PS 637: Performed by: PHYSICIAN ASSISTANT

## 2025-06-04 PROCEDURE — 93306 TTE W/DOPPLER COMPLETE: CPT | Mod: 26 | Performed by: INTERNAL MEDICINE

## 2025-06-04 PROCEDURE — 99233 SBSQ HOSP IP/OBS HIGH 50: CPT | Mod: GC | Performed by: STUDENT IN AN ORGANIZED HEALTH CARE EDUCATION/TRAINING PROGRAM

## 2025-06-04 PROCEDURE — 999N000208 ECHOCARDIOGRAM COMPLETE

## 2025-06-04 PROCEDURE — 80048 BASIC METABOLIC PNL TOTAL CA: CPT

## 2025-06-04 PROCEDURE — 120N000002 HC R&B MED SURG/OB UMMC

## 2025-06-04 PROCEDURE — 85018 HEMOGLOBIN: CPT | Performed by: PHYSICIAN ASSISTANT

## 2025-06-04 PROCEDURE — 82248 BILIRUBIN DIRECT: CPT

## 2025-06-04 PROCEDURE — 36415 COLL VENOUS BLD VENIPUNCTURE: CPT | Performed by: INTERNAL MEDICINE

## 2025-06-04 PROCEDURE — 97530 THERAPEUTIC ACTIVITIES: CPT | Mod: GP | Performed by: PHYSICAL THERAPIST

## 2025-06-04 PROCEDURE — 99233 SBSQ HOSP IP/OBS HIGH 50: CPT | Mod: 24 | Performed by: INTERNAL MEDICINE

## 2025-06-04 PROCEDURE — 70450 CT HEAD/BRAIN W/O DYE: CPT

## 2025-06-04 PROCEDURE — 250N000013 HC RX MED GY IP 250 OP 250 PS 637: Performed by: SURGERY

## 2025-06-04 PROCEDURE — 97110 THERAPEUTIC EXERCISES: CPT | Mod: GP | Performed by: PHYSICAL THERAPIST

## 2025-06-04 PROCEDURE — 36415 COLL VENOUS BLD VENIPUNCTURE: CPT

## 2025-06-04 PROCEDURE — 99233 SBSQ HOSP IP/OBS HIGH 50: CPT | Mod: 24 | Performed by: NURSE PRACTITIONER

## 2025-06-04 PROCEDURE — 250N000011 HC RX IP 250 OP 636: Performed by: STUDENT IN AN ORGANIZED HEALTH CARE EDUCATION/TRAINING PROGRAM

## 2025-06-04 PROCEDURE — 36415 COLL VENOUS BLD VENIPUNCTURE: CPT | Performed by: PHYSICIAN ASSISTANT

## 2025-06-04 RX ORDER — TACROLIMUS 1 MG/1
1 CAPSULE ORAL
Status: DISCONTINUED | OUTPATIENT
Start: 2025-06-04 | End: 2025-06-04

## 2025-06-04 RX ORDER — HYDROCORTISONE SODIUM SUCCINATE 100 MG/2ML
12.5 INJECTION INTRAMUSCULAR; INTRAVENOUS EVERY 6 HOURS
Status: DISCONTINUED | OUTPATIENT
Start: 2025-06-04 | End: 2025-06-05

## 2025-06-04 RX ORDER — TACROLIMUS 0.5 MG/1
0.5 CAPSULE ORAL
Status: DISCONTINUED | OUTPATIENT
Start: 2025-06-04 | End: 2025-06-04

## 2025-06-04 RX ADMIN — SENNOSIDES 2 TABLET: 8.6 TABLET, FILM COATED ORAL at 09:22

## 2025-06-04 RX ADMIN — NYSTATIN 1000000 UNITS: 100000 SUSPENSION ORAL at 19:32

## 2025-06-04 RX ADMIN — DICLOFENAC SODIUM 4 G: 10 GEL TOPICAL at 19:33

## 2025-06-04 RX ADMIN — HYDROCORTISONE SODIUM SUCCINATE 12.5 MG: 100 INJECTION, POWDER, FOR SOLUTION INTRAMUSCULAR; INTRAVENOUS at 23:37

## 2025-06-04 RX ADMIN — CEFTOLOZANE AND TAZOBACTAM 1.5 G: 1; .5 INJECTION, POWDER, LYOPHILIZED, FOR SOLUTION INTRAVENOUS at 21:34

## 2025-06-04 RX ADMIN — CEFTOLOZANE AND TAZOBACTAM 1.5 G: 1; .5 INJECTION, POWDER, LYOPHILIZED, FOR SOLUTION INTRAVENOUS at 12:38

## 2025-06-04 RX ADMIN — LINEZOLID 600 MG: 600 TABLET, FILM COATED ORAL at 19:32

## 2025-06-04 RX ADMIN — DIPHENHYDRAMINE HYDROCHLORIDE 25 MG: 25 SOLUTION ORAL at 05:40

## 2025-06-04 RX ADMIN — Medication 20000 UNITS: at 09:22

## 2025-06-04 RX ADMIN — URSODIOL 250 MG: 250 TABLET ORAL at 09:21

## 2025-06-04 RX ADMIN — HEPARIN SODIUM 5000 UNITS: 5000 INJECTION, SOLUTION INTRAVENOUS; SUBCUTANEOUS at 15:39

## 2025-06-04 RX ADMIN — OMEGA-3-ACID ETHYL ESTERS 1 G: 1 CAPSULE, LIQUID FILLED ORAL at 09:21

## 2025-06-04 RX ADMIN — DICLOFENAC SODIUM 4 G: 10 GEL TOPICAL at 12:38

## 2025-06-04 RX ADMIN — LEVOTHYROXINE SODIUM 175 MCG: 0.17 TABLET ORAL at 09:21

## 2025-06-04 RX ADMIN — NYSTATIN 1000000 UNITS: 100000 SUSPENSION ORAL at 12:38

## 2025-06-04 RX ADMIN — HYDROCORTISONE SODIUM SUCCINATE 12.5 MG: 100 INJECTION, POWDER, FOR SOLUTION INTRAMUSCULAR; INTRAVENOUS at 12:37

## 2025-06-04 RX ADMIN — HYDROCORTISONE SODIUM SUCCINATE 12.5 MG: 100 INJECTION, POWDER, FOR SOLUTION INTRAMUSCULAR; INTRAVENOUS at 17:31

## 2025-06-04 RX ADMIN — URSODIOL 250 MG: 250 TABLET ORAL at 19:32

## 2025-06-04 RX ADMIN — THERA TABS 1 TABLET: TAB at 09:22

## 2025-06-04 RX ADMIN — HYDROCORTISONE SODIUM SUCCINATE 25 MG: 100 INJECTION, POWDER, FOR SOLUTION INTRAMUSCULAR; INTRAVENOUS at 00:21

## 2025-06-04 RX ADMIN — GABAPENTIN 300 MG: 250 SOLUTION ORAL at 21:34

## 2025-06-04 RX ADMIN — CEFTOLOZANE AND TAZOBACTAM 1.5 G: 1; .5 INJECTION, POWDER, LYOPHILIZED, FOR SOLUTION INTRAVENOUS at 05:35

## 2025-06-04 RX ADMIN — SERTRALINE HYDROCHLORIDE 50 MG: 50 TABLET ORAL at 09:21

## 2025-06-04 RX ADMIN — OXYCODONE HYDROCHLORIDE 5 MG: 5 TABLET ORAL at 10:34

## 2025-06-04 RX ADMIN — HYDROCORTISONE SODIUM SUCCINATE 25 MG: 100 INJECTION, POWDER, FOR SOLUTION INTRAMUSCULAR; INTRAVENOUS at 05:42

## 2025-06-04 RX ADMIN — NYSTATIN 1000000 UNITS: 100000 SUSPENSION ORAL at 09:25

## 2025-06-04 RX ADMIN — HEPARIN SODIUM 5000 UNITS: 5000 INJECTION, SOLUTION INTRAVENOUS; SUBCUTANEOUS at 05:42

## 2025-06-04 RX ADMIN — Medication 1000 MCG: at 21:34

## 2025-06-04 RX ADMIN — Medication 1 MG: at 12:37

## 2025-06-04 RX ADMIN — OMEGA-3-ACID ETHYL ESTERS 1 G: 1 CAPSULE, LIQUID FILLED ORAL at 19:32

## 2025-06-04 RX ADMIN — Medication 1 MG: at 17:31

## 2025-06-04 RX ADMIN — CALCITRIOL CAPSULES 0.25 MCG 0.25 MCG: 0.25 CAPSULE ORAL at 09:22

## 2025-06-04 RX ADMIN — APIXABAN 2.5 MG: 2.5 TABLET, FILM COATED ORAL at 19:32

## 2025-06-04 RX ADMIN — EZETIMIBE 10 MG: 10 TABLET ORAL at 21:34

## 2025-06-04 RX ADMIN — SENNOSIDES 2 TABLET: 8.6 TABLET, FILM COATED ORAL at 19:32

## 2025-06-04 RX ADMIN — ESTRADIOL 2 G: 0.1 CREAM VAGINAL at 19:33

## 2025-06-04 RX ADMIN — LINEZOLID 600 MG: 600 TABLET, FILM COATED ORAL at 09:21

## 2025-06-04 ASSESSMENT — ACTIVITIES OF DAILY LIVING (ADL)
ADLS_ACUITY_SCORE: 63
ADLS_ACUITY_SCORE: 63
ADLS_ACUITY_SCORE: 64
ADLS_ACUITY_SCORE: 64
ADLS_ACUITY_SCORE: 63
ADLS_ACUITY_SCORE: 64
ADLS_ACUITY_SCORE: 63
ADLS_ACUITY_SCORE: 64
ADLS_ACUITY_SCORE: 64
ADLS_ACUITY_SCORE: 63
ADLS_ACUITY_SCORE: 63
ADLS_ACUITY_SCORE: 64
ADLS_ACUITY_SCORE: 63
ADLS_ACUITY_SCORE: 64

## 2025-06-04 NOTE — PLAN OF CARE
Goal Outcome Evaluation:    Plan of Care Reviewed With: patient    Overall Patient Progress: no change  Overall Patient Progress: no change    Assumed cares from 7253-1024. Pt is alert and oriented x4. VSS on 1L NC. Pt reading sinus zhou, provider notified - EKG completed. Tele also reading A fib intermittently. R PATY drain in place. R midline TKO. Colostomy and purewick in place. Midline abdominal incision open to air & intact. Refused repositioning overnight. Not oob during shift.

## 2025-06-04 NOTE — PROGRESS NOTES
[facility] Nurse Inpatient Assessment     Consulted for: new end colostomy    Summary: 5/30 - Pt's daughter Gloria at bedside for pouch change demo, would like to be present for education    6/3 - daughter not here - stool present in pouch  6/4: daughter not at bedside- unable to make it to hospital today. Pouch intact. Pt too tired for education today. Pt due for pouch change Thursday.     [facility] nurse follow-up plan: daily     Patient History (according to provider note(s):      75 year old female with history of liver transplant admitted for smoldering diverticulitis with 4.1cm abscess s/p open sigmoidectomy and end colostomy on 5/29/25,     Assessment:      Areas visualized during today's visit: Focused: Abdomen      6/2 LLQ    Assessment of new end Colostomy:  Diagnosis Pertinent to Stoma: Diverticulitis with perforation      Surgery Date: 5/29/25  Surgeon: Al Campbell MD       Hospital: [facility]  Pouching system in place on assessment today: Marina two piece, cut to fit, soft convex, barrier ring, and skin prep   Pouch last changed/wear time: 2 days   Reason for pouch change today: pouch not changed today  Effectiveness of current pouching/ supply plan: will try one piece next change and possibly Coloplast for different closure   Change made with ostomy management today: No  Pouching system placed today: [place] two piece, cut to fit, soft convex, barrier ring, and skin prep   Supplies: ordered one piece convex pouches and discussed with patient   Last photo: 6/2/25  Stoma location: LLQ  Stoma size: 32 mm   Stoma appearance: not assessed   Mucocutaneous junction: not assessed  Peristomal complication(s): not assessed   Output: watery brown stool  Output volume emptied during visit: did not empty (soft/liquid stool in pouch)  Abdominal assessment: Soft  Surgical site(s): dressing dry and intact  NG still in place? No  Pain: denies  Is patient still on a PCA?  "No    Ostomy education assessment:  Participant of teaching session today: patient   Education completed today: Pouching system assessment   Educational materials/methods: Verbal, Demonstration, FOD [place] education hand out, Product sample, Addressed patient/caregiver questions/concerns, and Left packet of information at bedside   Education still needed: Pouch change return demonstration, Pouch emptying demonstration, Pouch emptying return demonstration, Intake and output recording, Fluid and electrolyte balance , Importance of hydration, When to seek medical attention, Low fiber diet , Hernia prevention, Lifestyle adjustments , and Discharge instructions  Learning Comprehension:   Psychosocial assessment: Pt says she wants to be able to change pouch after discharge - wants to return to independent living apartment with some assistance (was in TCU prior to this admission). Daughter can assist after discharge if needed, wants to be present for teaching  Patient readiness for education today: attentive and active participation, states up all night, too tired for education   Following today's visit: patient  is able to demonstrate;         1. How to empty their pouch? No, Demo provided , and Needs additional practice         2. How to change their pouch? No and Needs additional practice         3. How to read and record intake and output correctly? No  Preparation for discharge completed: No discharge preparation started yet  Preparation for discharge still needed: No discharge preparation started yet  Pt support system on discharge: Daughter Gloria  [place] recommend home care? By [place] RN if possible  Face to face time: 10 minutes    Treatment Plan:     LLQ Colostomy pouching plan:   Pouching system: ostomy supplies pouches: Marina 57 FECAL ([id number]) ostomy supplies barrier: Marina 57mm SOFT CONVEX ([id number])  Accessories used: [place] ostomy accessories: 2\" Cera Barrier Ring ([id number]) and Cavilon no sting barrier film " ([id number])   Frequency of pouch changes: Twice weekly and PRN leakage  WOC follow up plan: Daily Monday-Friday (as able)  Bedside RN interventions: Change pouch PRN if leaking using the supplies above, Empty pouch when 1/3 to 1/2 full, ensure to clean pouch outlet after emptying to prevent odor, Notify WOC for ongoing pouch leakage, Document stoma appearance and output volume, color, and consistency every shift, Encourage patient to empty pouch with assist, and Assist patient to measure and record output     Orders: Reviewed      DATA:     Current support surface: Standard    Containment of urine/stool: Suction based external urinary catheter  and Colostomy pouch  BMI: Body mass index is 28.28 kg/m .   Active diet order: Orders Placed This Encounter      Clear Liquid Diet     Output: I/O last 3 completed shifts:  In: 1756.64 [P.O.:270; I.V.:1436.64; IV Piggyback:50]  Out: 1680 [Urine:1250; Drains:430]     Labs:   Recent Labs   Lab 05/30/25  0412 05/29/25  1515 05/29/25  1051 05/29/25  0546   ALBUMIN  --  2.4*  --   --    HGB 8.6* 8.5*   < > 7.6*   INR  --   --   --  1.49*   WBC 20.2* 20.2*  --  9.1   A1C  --  6.3*  --   --     < > = values in this interval not displayed.     Pressure injury risk assessment:   Sensory Perception: 2-->very limited  Moisture: 3-->occasionally moist  Activity: 1-->bedfast  Mobility: 2-->very limited  Nutrition: 2-->probably inadequate  Friction and Shear: 2-->potential problem  Shashank Score: 12    Gayatri Jackson, RN CWOCN   Pager no longer is use, please contact through [employer] Reviewed group: [facility] Nurse [place]   Dept. Office Number: [phone]

## 2025-06-04 NOTE — PROGRESS NOTES
HEPATOLOGY PROGRESS NOTE  Patient:  Luz Thompson, Date of birth 1950  Date of Visit:  06/04/2025  Referring Provider No ref. 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 Faecalis bacteremia (3/2025) and recurrent UTI's who was admitted with abdominal pain and hypotension and underwent sigmoidectomy and end colostomy for prior perforated diverticulitis and abscess formation and noted to have Klebsiella bacteremia.       RECOMMENDATIONS:    Updates for 06/04/2025 :  - continue with tacrolimus suspension, increased dose    # LDLT 5/22/02 for PBC  # Immunosuppression  - has been on sirolimus recently, stopped the day prior to her surgery. Advised her that she is at risk for delayed wound healing, can see delayed healing effects for up to several weeks.   - Now on tacrolimus suspension. Has allergy to agent used in capsules. Tacrolimus trough level on 6/3 is 1.0. Will increase her tacrolimus dose to 1 mg BID. Repeat trough level on Friday.   - her creatinine is improving, now to 1.39. She has been able to take in liquids.   -Tacrolimus suspension can have variable absorption.     Patient was discussed with attending physician, Dr. Eneida Montano.  The above note reflects our joint plan.     Next follow up appointment: Dr. Ramirez 8/22/25    Thank you for the opportunity to be involved with  Luz Thompson care. Please call with any questions or concerns.    Luzma Osman, APRN, CNP  Inpatient Hepatology [facility]              Subjective    States she has an appetite, slowly improving her oral intake. Denies significant/worsening abdominal pain. No fevers, change in mentation, or bleeding from stoma.         Objective    VS: BP (!) 138/90 (BP Location: Left arm)   Pulse 94   Temp 97.9  F (36.6  C) (Oral)    "Resp 18   Ht 1.702 m (5' 7\")   Wt 82.4 kg (181 lb 10.5 oz)   LMP 06/01/1988 (Approximate)   SpO2 100%   BMI 28.45 kg/m     Date 06/04/25 0700 - 06/05/25 0659   Shift 0224-3934 8777-1067 0124-2585 24 Hour Total   INTAKE   P.O. 200   200   Shift Total(mL/kg) 200(2.43)   200(2.43)   OUTPUT   Urine 600   600   Drains 60   60   Stool 50   50   Shift Total(mL/kg) 710(8.62)   710(8.62)   Weight (kg) 82.4 82.4 82.4 82.4      General: In no acute distress, no facial muscle wasting  Neuro: AOx3, No asterixis  HEENT:  Noscleral icterus  CV:  Skin warm and dry  Lungs:  Respirations even and nonlabored on room air  Abd:  nondistended.    Extrem: +1 lowerperipheral edema   Skin: Nojaundice  Psych: pleasant    MEDICATIONS:  Current Facility-Administered Medications   Medication Dose Route Frequency Provider Last Rate Last Admin    acetaminophen (TYLENOL) tablet 975 mg  975 mg Oral BID Jah Bettencourt PA-C   975 mg at 06/02/25 1011    albuterol (PROVENTIL) neb solution 2.5 mg  2.5 mg Nebulization Q4H PRN Jah Bettencourt PA-C        [Held by provider] amLODIPine (NORVASC) tablet 5 mg  5 mg Oral Daily Tess Rodriguez MD   5 mg at 06/01/25 0741    [Held by provider] apixaban ANTICOAGULANT (ELIQUIS) tablet 2.5 mg  2.5 mg Oral BID Jah Bettencourt PA-C        calcitRIOL (ROCALTROL) capsule 0.25 mcg  0.25 mcg Oral Daily Jah Bettencourt PA-C   0.25 mcg at 06/04/25 0922    calcium carbonate (TUMS) chewable tablet 1,000 mg  1,000 mg Oral Q4H PRN Jah Bettencourt PA-C   500 mg at 06/03/25 0304    ceftolozane-tazobactam (ZERBAXA) 1.5 g vial to attach to  ml bag  1.5 g Intravenous Q8H Negrito Christensen MD   1.5 g at 06/04/25 1238    glucose gel 15-30 g  15-30 g Oral Q15 Min PRN Tess Rodriguez MD        Or    dextrose 50 % injection 25-50 mL  25-50 mL Intravenous Q15 Min PRTess Huggins MD        Or    glucagon injection 1 mg  1 mg Subcutaneous Q15 Min PRN Tess Rodriguez MD        diclofenac (VOLTAREN) 1 % topical gel " 4 g  4 g Topical 4x Daily Jah Bettencourt PA-C   4 g at 06/04/25 1238    diphenhydrAMINE (BENADRYL) elixir 25 mg  25 mg Oral or Feeding Tube Q6H PRN Aleja Antunez MD   25 mg at 06/04/25 0540    estradiol (ESTRACE) cream 2 g  2 g Vaginal Once per day on Monday Wednesday Friday Jah Bettencourt PA-C   2 g at 05/30/25 2132    ezetimibe (ZETIA) tablet 10 mg  10 mg Oral At Bedtime Jah Bettencourt PA-C   10 mg at 06/03/25 2204    ferrous sulfate (FEROSUL) tablet 325 mg  325 mg Oral At Bedtime Jah Bettencourt PA-C        folic acid (FOLVITE) tablet 1,000 mcg  1,000 mcg Oral At Bedtime Jah Bettencourt PA-C   1,000 mcg at 06/03/25 2204    gabapentin (NEURONTIN) solution 300 mg  300 mg Oral At Bedtime Jah Bettencourt PA-C   300 mg at 06/03/25 2204    heparin ANTICOAGULANT injection 5,000 Units  5,000 Units Subcutaneous Q8H Josefina Perea DO   5,000 Units at 06/04/25 0542    [Held by provider] hydrALAZINE (APRESOLINE) tablet 50 mg  50 mg Oral TID Jah Bettencourt PA-C        hydrocortisone sodium succinate PF (solu-CORTEF) injection 12.5 mg  12.5 mg Intravenous Q6H Negrito Christensen MD   12.5 mg at 06/04/25 1237    insulin aspart (NovoLOG) injection (RAPID ACTING)  1-7 Units Subcutaneous TID AC Tess Rodriguez MD   2 Units at 06/04/25 1249    insulin aspart (NovoLOG) injection (RAPID ACTING)  1-5 Units Subcutaneous At Bedtime Tess Rodriguez MD   1 Units at 06/03/25 2205    levothyroxine (SYNTHROID/LEVOTHROID) tablet 175 mcg  175 mcg Oral QAM AC Jah Bettencourt PA-C   175 mcg at 06/04/25 0921    Lidocaine (LIDOCARE) 4 % Patch 2 patch  2 patch Transdermal Q24H Josefina Perea DO   2 patch at 06/01/25 0740    lidocaine (LMX4) cream   Topical Q1H PRN Jah Bettencourt PA-C        lidocaine 1 % 0.1-1 mL  0.1-1 mL Other Q1H PRN Jah Bettencourt PA-C        linezolid (ZYVOX) tablet 600 mg  600 mg Oral Q12H [facility] (08/20) Maribel Landin MD   600 mg at 06/04/25 0921    melatonin tablet 1 mg  1 mg Oral At Bedtime Datario,  Josefina, DO        methocarbamol (ROBAXIN) tablet 500 mg  500 mg Oral 4x Daily PRN Arlene Tang MD        [Held by provider] metoprolol tartrate (LOPRESSOR) half-tab 12.5 mg  12.5 mg Oral BID Datario, Josefina, DO   12.5 mg at 05/31/25 0757    multivitamin, therapeutic (THERA-VIT) tablet 1 tablet  1 tablet Oral Daily Datario, Josefina, DO   1 tablet at 06/04/25 0922    naloxone (NARCAN) injection 0.2 mg  0.2 mg Intravenous Q2 Min PRN Aleja Antunez MD        Or    naloxone (NARCAN) injection 0.4 mg  0.4 mg Intravenous Q2 Min PRN Aleja Antunez MD        Or    naloxone (NARCAN) injection 0.2 mg  0.2 mg Intramuscular Q2 Min PRN Aleja Antunez MD        Or    naloxone (NARCAN) injection 0.4 mg  0.4 mg Intramuscular Q2 Min PRN Aleja Antunez MD        Nutrisource Fiber PO packet 1 each  1 packet Oral Daily Jah Bettencourt PA-C   1 each at 06/02/25 1723    nystatin (MYCOSTATIN) suspension 1,000,000 Units  1,000,000 Units Oral 4x Daily Jah Bettencourt PA-C   1,000,000 Units at 06/04/25 1238    omega-3 acid ethyl esters (LOVAZA) capsule 1 g  1 g Oral BID Tess Rodriguez MD   1 g at 06/04/25 0921    ondansetron (ZOFRAN) injection 4 mg  4 mg Intravenous Q6H PRN Jah Bettencourt PA-C   4 mg at 06/03/25 1131    oxyCODONE (ROXICODONE) tablet 5 mg  5 mg Oral Q3H PRN Datakey Josefina, DO   5 mg at 06/04/25 1034    [Held by provider] predniSONE (DELTASONE) half-tab 9 mg  9 mg Oral Daily Luzma Osman, APRN CNP        sennosides (SENOKOT) tablet 2 tablet  2 tablet Oral or Feeding Tube BID Norah Jaquez CNP   2 tablet at 06/04/25 0922    sertraline (ZOLOFT) tablet 50 mg  50 mg Oral Daily Jah Bettencourt PA-C   50 mg at 06/04/25 0921    sodium chloride (PF) 0.9% PF flush 3 mL  3 mL Intracatheter Q8H LUZMARIA Jah Bettencourt PA-C   3 mL at 06/04/25 0542    sodium chloride (PF) 0.9% PF flush 3 mL  3 mL Intracatheter q1 min prn Jah Bettencourt PA-C        tacrolimus (PROGRAF BRAND) suspension 1 mg  1 mg Oral BID IS  Luzma Osman, APRN CNP   1 mg at 06/04/25 1237    ursodiol (ACTIGALL) tablet 250 mg  250 mg Oral BID Jah Bettencourt PA-C   250 mg at 06/04/25 0921    vitamin A 3 MG (33995 UNITS) capsule 20,000 Units  20,000 Units Oral Daily Al Campbell MD   20,000 Units at 06/04/25 0922       REVIEW OF LABORATORY, PATHOLOGY AND IMAGING RESULTS:  BMP  Recent Labs   Lab 06/04/25  1247 06/04/25  0834 06/04/25  0504 06/04/25  0210 06/03/25  0828 06/03/25  0557 06/02/25  1016 06/02/25  0538 06/01/25  0736 06/01/25  0406   NA  --   --  141  --   --  142  --  141  --  138   POTASSIUM  --   --  4.7  --   --  3.8  --  4.6  --  4.8   CHLORIDE  --   --  108*  --   --  109*  --  108*  --  108*   KEILY  --   --  8.0*  --   --  8.0*  --  8.1*  --  7.7*   CO2  --   --  22  --   --  23  --  20*  --  19*   BUN  --   --  43.3*  --   --  46.5*  --  60.1*  --  76.0*   CR  --   --  1.39*  --   --  1.41*  --  1.83*  --  2.32*   * 204* 228* 220*   < > 217*   < > 212*   < > 179*  194*    < > = values in this interval not displayed.     CBC  Recent Labs   Lab 06/04/25  0611 06/03/25  0657 06/02/25  0538 06/01/25  0406   WBC 11.5*  11.5* 9.5 11.5* 14.6*   RBC 2.70*  2.70* 2.61* 2.92* 2.65*   HGB 7.4*  7.4* 7.3* 8.1* 7.3*   HCT 23.3*  23.3* 22.9* 25.8* 23.0*   MCV 86  86 88 88 87   MCH 27.4  27.4 28.0 27.7 27.5   MCHC 31.8  31.8 31.9 31.4* 31.7   RDW 18.1*  18.1* 18.0* 18.3* 18.5*     243 199 190 177     INR  Recent Labs   Lab 05/29/25  0546   INR 1.49*     LFTs  Recent Labs   Lab 06/04/25  0611 06/01/25  0406 05/29/25  1515   ALKPHOS 137 137 196*   AST 21 17 68*   ALT 20 30 53*   BILITOTAL 0.2 0.2 0.2   PROTTOTAL 5.5* 5.0* 4.6*   ALBUMIN 2.7* 2.5* 2.4*        Previous work-up:   Lab Results   Component Value Date    HEPBANG Nonreactive 05/13/2016    HBCAB Nonreactive 05/13/2016    AUSAB 0.09 05/13/2016    HCVAB  05/13/2016     Nonreactive   Assay performance characteristics have not been established for newborns,   infants,  "and children      HCVRNA <25 12/23/2008    LAURA 44 04/07/2025    IRONSAT 13 (L) 04/07/2025     08/05/2019    TSH 1.72 04/04/2025    CHOL 291 (H) 09/04/2024    HDL 72 09/04/2024     (H) 09/04/2024    TRIG 329 (H) 09/04/2024    A1C 6.3 (H) 05/29/2025      No results found for: \"SPECDES\", \"LDRESULTS\"      Imaging Results:  No new imaging to review.        "

## 2025-06-04 NOTE — PROGRESS NOTES
[facility]    Progress Note - Colorectal Surgery Service       Date of Admission:  5/28/2025    Assessment & Plan: Surgery   Mrs. Thompson is a 75 year old female with a complex PMH, some of which includes CKD stage 3, CVA and DVT on anticoag, atrial fibrillation, DM2, biliary cirrhosis s/p prior liver transplant admitted for smoldering diverticulitis with 4.1cm abscess on prolonged IV abx as an outpatient.  Was admitted on 5/29 with worsening abdominal pain, hypotension due to gram negative septicemia, diarrhea.  Now s/p exploratory laparotomy, sigmoidectomy and end colostomy on 5/30/25, recovering appropriately with return of bowel function.    Polymicrobial bacteremia likely 2/2 GI source.     - Reg diet.  Recommend protein nutritional supplements as able.    - SQH, ok to restart eliquis on 6/4  - Appreciate ID recommendations (now on Zebraxa)  - Hx of liver transplant - immunosuppression per transplant immunology team (changed from sirolimus to tac).  On IV hydrocort taper for stress dosing.   - High dose Vitamin A x30 days post-op for wound healing support in setting of immunosuppression  - Getting senokot BID  - Continue WOC ostomy cares   - Continue PATY drain   - Encourage out of bed and ambulation, incentive spirometer use      Diet: Snacks/Supplements Adult: Other; PRN per pt request; With Meals  Snacks/Supplements Adult: Other; Offer pt Gelatein Plus or 20 TID with meals - please ask what flavor she prefers with each meal; With Meals  Calorie Counts  Room Service  Advance Diet as Tolerated: Regular Diet Adult; Regular Diet Adult  Snacks/Supplements Adult: Glucerna; Between Meals    DVT Prophylaxis: SQH  Ron Catheter: Not present  Lines: None       Drains: PRESENT         - 1 Closed/Suction Drain(s)    - 1 Ureteral Drain/Stent(s)   Cardiac Monitoring: ACTIVE order. Indication: Bradycardias (48 hours)  Code Status: Full Code      Clinically Significant Risk  "Factors          # Hyperchloremia: Highest Cl = 109 mmol/L in last 2 days, will monitor as appropriate          # Hypoalbuminemia: Lowest albumin = 2.4 g/dL at 5/29/2025  3:15 PM, will monitor as appropriate       # Hypertension: Noted on problem list            # Overweight: Estimated body mass index is 28.45 kg/m  as calculated from the following:    Height as of this encounter: 1.702 m (5' 7\").    Weight as of this encounter: 82.4 kg (181 lb 10.5 oz).        # Financial/Environmental Concerns: none         Social Drivers of Health   Tobacco Use: Medium Risk (5/27/2025)    Patient History     Smoking Tobacco Use: Former     Smokeless Tobacco Use: Never   Physical Activity: Insufficiently Active (11/9/2023)    Received from [employer]    Exercise Vital Sign     Days of Exercise per Week: 5 days     Minutes of Exercise per Session: 10 min         Disposition Plan     Pending tolerance of advancement of diet and return of bowel function      Patient was seen and discussed with colorectal fellow, Dr. Montano, who discussed with colorectal staff, Dr. Shannan Silva MD  General Surgery Resident  [phone]      Non-urgent messages: Securely message with [employer] (more info)  Text page via [employer] Paging/Directory     ______________________________________________________________________    Interval History   No acute events overnight. She states she had a little nausea sitting up in bed yesterday afternoon. She denies any N/V currently. She denies being in acute distress.     Physical Exam   Vital Signs: Temp: 98.3  F (36.8  C) Temp src: Oral BP: (!) 174/72 Pulse: 65   Resp: 16 SpO2: 96 % O2 Device: Nasal cannula Oxygen Delivery: 1 LPM  Weight: 181 lbs 10.54 oz    Intake/Output Summary (Last 24 hours) at 6/4/2025 0702  Last data filed at 6/4/2025 0513  Gross per 24 hour   Intake 840 ml   Output 2370 ml   Net -1530 ml     General Appearance: Alert and oriented, no acute distress  Respiratory: no increased WOB "   Cardiovascular: regular rate   GI: soft, nondistended, non-tender to palpation around ostomy in LLQ, ostomy pink and well perfused with gas as well as liquid stool, incision clean/dry/intact with staples, PATY somewhat murky serosanguinous output   Skin: no rashes    Data     I have personally reviewed the following data over the past 24 hrs:    11.5 (H)  \   7.4 (L)   / 243     141 108 (H) 43.3 (H) /  228 (H)   4.7 22 1.39 (H) \       Imaging results reviewed over the past 24 hrs:   No results found for this or any previous visit (from the past 24 hours).

## 2025-06-04 NOTE — PROGRESS NOTES
Care Management Follow Up    Length of Stay (days): 7    Expected Discharge Date: 06/09/2025     Concerns to be Addressed: discharge planning     Patient plan of care discussed at interdisciplinary rounds: Yes    Anticipated Discharge Disposition: Home Care with IV abx         Anticipated Discharge Services: Home Care (RN/PT/OT/HHA)  Anticipated Discharge DME: None    Patient/family educated on Medicare website which has current facility and service quality ratings:    Education Provided on the Discharge Plan:    Patient/Family in Agreement with the Plan:      Referrals Placed by CM/SW:    Private pay costs discussed: Not at this time. Will update patient when have infusion benefit checks    Discussed  Partnership in Safe Discharge Planning  document with patient/family: No     Handoff Completed: Yes, [facility] PCP: Internal handoff referral completed    Additional Information:  Per Faviola Souza- [facility] liaision- Zernaxa home IV would be $246.27 per day for I and supplies along with $90 per visit by RN if needed. She reports if patient is homebound the nursing would be covered, but we would have to find another agency to see her.     Spoke with Roz at [facility] and she reports she will need signed hard copy of antibiotic faxed to her at [phone] to run prior auth to get benefit check.   Hard copy of script printed and placed in patient hard chart. Dr. Christensen notified and reports he will sign today or tomorrow.     Next Steps:   IMM  IHO completed for TCU discharge, if home with HC discharge, needs new IHO   Check to see if hard copy signed script for Zernaxa (Ceftolozane-tazobactam) and send to Roz at [facility].  Update patient when receive benefit check.     Discharge Resource:     [facility]   P: [phone]  F: [phone]   LSN: Faviola Souza and Yamilet Jim (M-F)     [facility]   Roz Josue RN Clinical    Email: [email or web address]   M: [phone]   O: [phone]  F: [phone]      [facility]  P: [phone]  F: [phone]  Services: RN (wound cares, PICC cares/labs), PT, OT, HHA  SOC: 24-48 hours after discharge       Flower Phipps RN, BSN  [facility]  Inpatient Care Management - FLOAT  Covering 7C  [place] ([id number]) Saturday & Sunday; ([id number]) [facility] Recognized Holidays     Units: 5A Onc Vocera & 5C Vocera   Units: 6B Vocera & 6C Vocera   Units: 7A SOT RNCC Vocera, 7B Med Surg Vocera, & 7C Med Surg Vocera  Units: 6A Vocera & 4A CVICU Vocera, 4C MICU Vocera, and 4E SICU Vocera   Units: 5 Ortho Vocera & 5 Med Surg Vocera    Units: 6 Med Surg Vocera & 8 Med Surg Vocera

## 2025-06-04 NOTE — PROGRESS NOTES
"[id number]  A&O4. Flipping between SR & a fib, hR sitting 70-80 & 100-120 when up with activity. RA during day, needing supplemental O2 on & off when desating to 85%.   Complaining of dysarthria and \"slurred speech\"? All neuro exams preformed by RN & MD WDL. CT & MRI ordered  "

## 2025-06-04 NOTE — PROGRESS NOTES
Calorie Count  Intake recorded for: 6/3  Total Kcals: 626 Total Protein: 32g  Kcals from Hospital Food: 626  Protein: 32g  Kcals from Outside Food (average):0 Protein: 0g  # Meals Ordered from Kitchen: 2 meals   # Meals Recorded: 2 meals (First -  100% cranberry juice, 75% omelet w/ extra cheese, 25% coffee w/ cream)       (Second - 25% mac & cheese, apple juice)   # Supplements Recorded: 100% 1 Glucerna

## 2025-06-04 NOTE — PLAN OF CARE
"Nursing Care Plan Note:    Assumed care 0700 to 1930    /78 (BP Location: Left arm)   Pulse 81   Temp 98.3  F (36.8  C) (Oral)   Resp 18   Ht 1.702 m (5' 7\")   Wt 82.4 kg (181 lb 10.5 oz)   LMP 06/01/1988 (Approximate)   SpO2 95%   BMI 28.45 kg/m      Pt rounded on hourly  [place]:  alert and oriented   Pain:  pt has pain 6/10 PRN oxycodone given   GI/:  pt had nausea. PRN zofran given Bowel sounds present. Good urine output clear yellow urine via purewick. Pt has colostomy. PATY drain to R side. Midline incision closed with sutures  How Pt Takes Meds:  oral one at a time with a full cup of water  Cardiac:  intermittent Afib and occasional PVCs pt on tele  Respiratory:  denies SOB lung sounds clear bilaterally diminished lower lobes   Skin:  midline ABD incision, colostomy and R PATY  Lines:  PIV infusing TKO for ABX  Labs (Electrolyte protocol/DM):  Electrolyte protocol replaced phosphorus and potasium. Rechecks ordered for tomorrow morning   Activity/mobility:  2 assist stand to pivot  Events:  pt worked with therapy to sit at the edge of the bed, pt started having pain and nausea  Plan:  continue with ABX    Call light in reach, Pt able to make needs known, Will continue to monitor and follow plan of care.   Goal Outcome Evaluation:      Plan of Care Reviewed With: patient    Overall Patient Progress: no changeOverall Patient Progress: no change    Outcome Evaluation: A&Ox4 pt on RA while awake 1L while sleeping, pt BP is slightly elevated BP meds are on hold, Pt has occasional PVCs and intermitent Afib pt on tele. R midline removed per order and has a pIV infussing TKO for IV ABX. Pt on calorie counts. PATY drain on R, colostomy, and midline ABD incision closed with sutures. Pt refused repositioning occasionally.          "

## 2025-06-04 NOTE — PROGRESS NOTES
[facility]    Progress Note - Medicine Service, JOVANNY TEAM 3       Date of Admission:  5/28/2025    Assessment & Plan   Luz [place] is a 75 year old female who was admitted on 5/28/2025 with past medical history of atrial fibrillation, DVT on eliquis, CVA, CKD III, HFpEF (TTE 3/9/25 EF 60-65%), T2DM, biliary cirrhosis s/p liver transplant in 2002, EBV, HTN, HLD, Sjogren's syndrome, Basal Cell Carincoma s/p MOHS on 4/8/25, thyroid cancer s/p thyroidectomy in 2010, Sjogren's syndrome, and diverticulitis with recent admission from 4/14-4/23 for ESBL UTI & E. Faecalis bacteremia related to sigmoid microperforation (treated with IV ertapenem) who is admitted for abdominal pain and hypotension, found to have smoldering diverticulitis with 4.1cm abscess s/p open sigmoidectomy and end colostomy on 5/30/25, with Psuedomonas positive blood cultures drawn 5/28.  Ongoing IV antibiotics due to bacteremia (GPC and VRE) and weaning off stress dose steroids.    Today:  - Concern for stroke d/t slower speech  - Negative CTH/TTE bubble   - MRI pending   - Continuing Ceftolozane-Tazobactam  - Weaning IV steroids    # Diverticulitis c/b abscess s/p open sigmoidectomy with end colostomy 5/30/25  # Sepsis 2/2 Klebsiella pneumoniae and Pseudomonas aeruginosa bacteremia 5/28, likely of intra-abdominal origin   # Hypotension 2/2 sepsis  Patient now hypertensive, after coming off pressors on 5/30. Currently with ongoing IV steroid taper, will plan to stop continuous maintenance fluids. Will hold PTA antihypertensives while titrating and resume when able. Plan to continue meropenem for psuedomonas coverage and switch vancomycin to linezolid iso DERREK for coverage of previous enterococcus positive cultures.  - CRS consulted   - ADAT  - subcutaneous heparin for DVT ppx, continue to hold eliquis for now  - Hx of liver transplant - immunosuppression per transplant immunology team   - High  dose Vitamin A x30 days post-op for wound healing support in setting of immunosuppression  - senokot BID  - Continue WOC ostomy cares   - Continue PATY drain   - Encourage out of bed and ambulation, incentive spirometer use   - Transplant ID signed off, reconsulted for recommendations for abx duration given new VRE bacteremia  - meropenem (5/31- 6/3) for pseudomonas coverage  - linezolid (6/1 -6/3)  for enterococcal bacteremia  - Ceftolozane-Tazobactam (6/3- ), will need to determine duration given ongoing bacteremia (has not cleared yet)  - Pain regimen:   - Scheduled: Robaxin QID, tylenol 975mg BID, gabapentin 300mg QHS, Lidocaine patches daily, Diclofenac QID  -  PRN: PO oxycodone 5mg  - Blood cultures drawn 5/28 positive for Klebsiella and Pseudomonas  - Bcx (5/31): E Faecium VRE  - Bcx (6/2): GPC in pairs and chains   - Decrease IV hydrocortisone 12.5mg q6h (50mg q6h previously), plan to transition to if prednisone 10 mg if BP allows   - If decreasing back to IS dosing, would pause on pred 10 until tac level therapeutic (goal 3-5)   - hold PTA amlodipine while tapering fluids and IV steroids    #C/f TIA   In the morning of 6/4, patient states that she has had speech problems in the last couple of days since the surgery.  She describes that she has trouble getting her words out and that her speech does not feel as fluent as it was previously.  She denies any new numbness or tingling.  No headache or changes in vision.  Nonfocal on exam.  CT head negative for intracranial bleed or other acute process.  TTE with bubble also negative.  Certainly possible that patient may have had a TIA following surgery as anticoagulation had been held.  Will proceed with MRI brain for further workup.  - Resume PTA Xarelto  - Follow-up brain MRI    # DERREK on CKD3 - likely ATN   # Bilateral stent placement per urology 5/29  # ATN  DERREK not resolving despite fluid resuscitation, suspect ATN in the setting of hypotension, shock,  nephrotoxic meds   - Baseline creatinine 1.3-1.6  - external catheter   - PTA estradiol vaginal  cream  - consider repeat urine studies to confirm dx, but given improvement will hold off   - Consider diuresis in AM to encourage increased urine output in setting of likely ATN.     # Primary biliary cirrhosis s/p liver transplant 2002  # EBV +  # Reactive Leukocytosis   # Sjogren's syndrome  - Transplant Hepatology consulted  - No sirolimus  - If decreasing back to IS dosing, would start on pred 10 until tac level therapeutic (goal 3-5)   - Tac 1 mg po BID per transplant hep   - ordered suspension formulation instead of capsule due to gelatin allergy  - CMP in AM  - PTA Nystatin QID  - PTA Ursodiol     # Acute post-op pain  # high risk delirium   - delirium precaution   - Monitor neurological status. Delirium preventions and precautions.   - pain regimen as above  - PTA sertraline  - melatonin Q HS  - PRN narcan    # Protein calorie deficit malnutrition    - Nutrition consulted  - Supplements: Gelatin Plus TID with meals + PRN per patient request  - Thera-Vit 1 tablet daily   - Vitamin A level (ordered)    # Post operative ventilatory support, resolved  # Acute hypoxic respiratory support   Currently on 1L NC.  - Supplemental oxygen to keep saturation above 92 %.  - PRN albuterol     # Paroxysmal A-fib on apixaban   # Hypertension  # HFpEF (TTE 3/9/25 EF 60-65%)   # history of CVA  # Vasoplagia  - Monitor hemodynamic status.  - Levophed off since 0400 5/31  - MAP goal >65  - PTA ezetimibe  - holding PTA amlodipine 5mg BID  - HOLD PTA apixaban 2.5mg BID, CRS to advise when able to restart  - started SQH 5/30   - HOLDing metoprolol tartrate 12.5 mg BID  - holding hydralazine, when reordering would prefer 2 25 mg tabs   - SBP >100 or MAP >65  - weaning steroids as above     # Type 2 diabetes  # Stress hyperglycemia    - MDSSI  - Goal to keep BG< 180 for optimal wound healing   - Glucose AC/HS  - hypoglycemic protocol  -  "hydrocortisone as above    # Thyroid cancer status post thyroidectomy 2010  - PTA calcitriol 0.25mcg  - PTA levothyroxine 175mcg    # Acute blood loss anemia    # Anemia of critical illness  # coagulopathy due to cirrhosis and surgical blood loss    # thrombocytopenia   # anemia of critical illness   - Transfuse if hgb <7.0 or signs/symptoms of hypoperfusion. Monitor and trend.   - HOLD PTA apixaban 2.5mg BID  - PTA Ferous sulfate, folic acid     # Weakness and deconditioning of critical illness   - Physical and occupational therapy consult   - up with assist, fall precaution     # Basal cell carcinoma status post Mohs April 2025        Diet: Snacks/Supplements Adult: Other; PRN per pt request; With Meals  Snacks/Supplements Adult: Other; Offer pt Gelatein Plus or 20 TID with meals - please ask what flavor she prefers with each meal; With Meals  Full Liquid Diet  Calorie Counts    DVT Prophylaxis: Xarelto  Ron Catheter: Not present  Fluids: PO  Lines: PIV      Cardiac Monitoring: None  Code Status: Full Code      Clinically Significant Risk Factors          # Hyperchloremia: Highest Cl = 110 mmol/L in last 2 days, will monitor as appropriate          # Hypoalbuminemia: Lowest albumin = 2.4 g/dL at 5/29/2025  3:15 PM, will monitor as appropriate    # Acute Kidney Injury, unspecified: based on a >150% or 0.3 mg/dL increase in last creatinine compared to past 90 day average, will monitor renal function  # Hypertension: Noted on problem list            # Overweight: Estimated body mass index is 28.45 kg/m  as calculated from the following:    Height as of this encounter: 1.702 m (5' 7\").    Weight as of this encounter: 82.4 kg (181 lb 10.5 oz)., PRESENT ON ADMISSION     # Financial/Environmental Concerns: none         Social Drivers of Health   Tobacco Use: Medium Risk (5/27/2025)    Patient History     Smoking Tobacco Use: Former     Smokeless Tobacco Use: Never   Physical Activity: Insufficiently Active (11/9/2023) "    Received from [facility]    Exercise Vital Sign     Days of Exercise per Week: 5 days     Minutes of Exercise per Session: 10 min        The patient's care was discussed with the Attending Physician, Dr. Russo.    Negrito Christensen MD   Internal Medicine PGY-1  Medicine Service, [address]  Securely message with "[employer] (more info)  Text page via [employer] Paging/Directory   See signed in provider for up to date coverage information  ______________________________________________________________________    Interval History   NAEO. States that patient had problem with her speech since the surgery and has trouble getting words out. Feels that her speech is not as fluent. No new numbness or tingling but patient is worried about a stroke as this is similar to how her previous stroke presented previously . Otherwise no new symptoms or other concerns.     Physical Exam   Vital Signs: Temp: 98.3  F (36.8  C) Temp src: Oral BP: (!) 168/89 Pulse: 61   Resp: 10 SpO2: 96 % O2 Device: Nasal cannula Oxygen Delivery: 1 LPM  Weight: 181 lbs 10.54 oz    General Appearance: NAD, resting comfortably in bed, alert and oriented  Respiratory: CTAB, normal work of respiration  Cardiovascular: RRR, no m/r/g  GI: Soft, nontender, nondistended, ostomy with minimal output, PATY drain with serosanguinous output, dressings CDI  MSK/Skin: no rashes on exposed skin, moving all extremities  Other: No focal neuro-deficits, strength 5/5 bilaterally. CN intact, no extraocular movement deficits

## 2025-06-04 NOTE — PROGRESS NOTES
Transplant Infectious Diseases Inpatient Consultation      Luz Thompson MRN# [id number]   YOB: 1950 Age: 75 year old   Date of Admission and time: 5/28/2025 12:43 PM     Reason for consult: indwelling lines, infection management           Active Problems and Infectious Diseases Issues:     Polymicrobial bacteremia:  CR - Pseudomonas aeruginosa (on ceftolozane-tazobactam since 6/3)  Klebsiella pneumoniae (on ceftolozane-tazobactam since 6/3)  Vancomycin-Resistant Enterococcus faecium (on linezolid, cultures from 6/3 remain negative)  Post-operative course s/p open sigmoidectomy and end colostomy (5/30/25) for diverticulitis with abscess.  Immunocompromised state s/p liver transplant (2002) on tacrolimus.  History of ESBL UTI and E. faecalis bacteremia (April 2025, completed treatment).        Impression and Recommendations:     Ms. Thompson is a 75-year-old liver transplant recipient (2002, biliary cirrhosis) on tacrolimus, currently s/p open sigmoidectomy and end colostomy (5/30/25) for diverticulitis with abscess. Her early post-operative course has been complicated by polymicrobial bacteremia with CR-Pseudomonas aeruginosa, Klebsiella pneumoniae, and VR Enterococcus faecium (VRE). Blood cultures cleared on 6/3/25.    She is currently on ceftolozane-tazobactam (MARYJANE <=2 for both CR-PA and K. pneumoniae) and oral linezolid (MARYJANE 2 for VRE). She remains afebrile, hemodynamically stable, and without clinical signs of persistent infection. TTE was negative for endocarditis. Given adequate source control, appropriate antimicrobial coverage, and clinical response, we recommend completing 14 days of therapy from 6/3/25 if cultures remain negative. Recommend also to repeat cultures to confirm sustained clearance.    Recommendations:    Continue ceftolozane-tazobactam for CR-PA and Klebsiella. Her cultures have been negative, the first day of effective therapy is 6/3.   Continue linezolid for VRE  bacteremia. Given stable course and source control, ok to continue oral formulation (excellent bioavailability).   Repeat blood cultures tomorrow (6/5).     Transplant infectious diseases will continue to follow.      Discussed with the attending on service, Dr. Isidro.     Jose Ramon Lee MD  Infectious Diseases Fellow         HPI & Synopsis of Clinical Presentation and Course   Transplant Summary  Transplants:  5/22/2002 (Liver); POD  8414.  Coordinator Angelika Frausto  Reason for Transplantation: Biliary cirrhosis  Current Immunosuppression: Tacrolimus suspension 0.5 mg PO BID. (Prednisone 9mg daily currently held).    Luz Thompson is a 75-year-old woman with a history of biliary cirrhosis s/p liver transplant in 2002, EBV, Sjogren's syndrome, and recent basal cell carcinoma s/p Mohs surgery (4/8/25). She had a prior admission from 4/14/25-4/23/25 for ESBL UTI and E. faecalis bacteremia related to a sigmoid microperforation, which was treated with IV ertapenem. She follows with Dr. Lundberg as an outpatient.    She was re-admitted on 5/28/2025 with diverticulitis and a 4.1cm abscess. She underwent an open sigmoidectomy and end colostomy on 5/30/25. Her kayla-operative course has been complicated by polymicrobial bacteremia. Blood cultures from 5/28/25 grew Pseudomonas aeruginosa and Klebsiella pneumoniae; this seem to have been a transient bacteremia as it cleared by 5/31/25. Subsequent blood cultures on 5/31/25 grew Vancomycin-Resistant Enterococcus faecium (VRE), which cleared on 6/3/25.     Current antimicrobial therapy includes linezolid (started 6/2/25 for VRE) and Zerbexa (started 6/3/25 for CR-PA). She remains afebrile, and her WBC is slightly up today with normal eosinophils. She is on oxygen but reports no acute concerns. We will plan to continue with this therapy for at least 2 weeks for her bacteremia. TTE was negative for valvular pathology. Due to concern for speech difficulty this morning,  MRI brain is being pursued for stroke workup.                Enterococcus faecium VRE       MARYJANE     Ampicillin >=32 ug/mL Resistant     Daptomycin 3 ug/mL Susceptible Dose Dependent     Gentamicin Synergy Susceptible... Susceptible 1     Linezolid 2 ug/mL Susceptible     Vancomycin >=32 ug/mL Resistant                         Klebsiella pneumoniae Pseudomonas aeruginosa      MARYJANE MARYJANE     Ampicillin  Resistant 1       Ampicillin/ Sulbactam >=32 ug/mL Resistant       Cefepime <=0.12 ug/mL Susceptible 16 ug/mL Intermediate     Ceftazidime <=0.5 ug/mL Susceptible >=32 ug/mL Resistant     Ceftazidime Avibactam   <=2 ug/mL Susceptible     Ceftolozane/Tazobactam   <=2 ug/mL Susceptible     Ceftriaxone <=0.25 ug/mL Susceptible       Ciprofloxacin 0.5 ug/mL Intermediate 0.5 ug/mL Susceptible     Colistin   <=2 ug/mL Intermediate     Gentamicin <=1 ug/mL Susceptible       Levofloxacin 1 ug/mL Intermediate 2 ug/mL Intermediate     Meropenem <=0.25 ug/mL Susceptible 32 ug/mL Resistant     Piperacillin/Tazobactam 16 ug/mL Susceptible Dose Dependent >=128 ug/mL Resistant     Trimethoprim/Sulfamethoxazole <=1/19 ug/mL Susceptible          Transplant Checklist  - QTc interval: 379 on 6/3/2025  - Pneumocystis prophylaxis: none   - Bacterial prophylaxis: none   - Fungal prophylaxis: none   - Serostatus & viral prophylaxis: none   - Immunization status: F/u  - Gamma globulin status: IgG 1110 on 8/5/19              Immunizations:     Immunization History   Administered Date(s) Administered    COVID-19 12+ (Pfizer) 10/17/2024    COVID-19 Bivalent 12+ (Pfizer) 02/21/2021, 03/15/2021    COVID-19 MONOVALENT 12+ (Pfizer) 02/21/2021, 03/15/2021, 03/29/2021, 10/09/2021    COVID-19 Vaccine, Unspecified 02/08/2021, 03/15/2021, 03/29/2021    Flu 65+ (Fluad) 12/21/2019    Flu, Unspecified 11/07/2022    [id number] Novel Flu P-free 11/10/2009    HEPA 07/01/2001    HepB, Unspecified 07/01/2001    Influenza (High Dose) Trivalent,PF (Fluzone) 09/25/2015,  10/30/2016, 10/23/2017, 10/24/2024    Influenza (IIV3) PF 11/10/2004, 09/29/2007, 10/01/2010, 09/27/2012, 10/29/2012, 09/30/2013, 09/01/2016    Influenza Vaccine 18-64 (Flublok) 10/20/2018    Influenza Vaccine 65+ (Fluzone HD) 11/03/2020, 03/14/2022, 10/20/2022    Influenza Vaccine, 6+MO IM (QUADRIVALENT W/PRESERVATIVES) 10/01/2020    Pneumo Conj 13-V (2010&after) 02/26/2014, 01/01/2016    Pneumococcal 23 valent 12/01/2007, 01/01/2014, 05/25/2016    TD,PF 7+ (Tenivac) 01/01/2007    TDAP (Adacel,Boostrix) 01/01/2014    TDAP Vaccine (Adacel) 09/17/2007, 02/26/2014            Allergies:     Allergies   Allergen Reactions    Fluconazole Hives and Itching     Full body hives  **Intradermal skin testing negative**  [See intradermal skin testing results from 8/2/2019]    Mycophenolate Diarrhea and Nausea and Vomiting     Patient stated it was chronic and lasted months      Penicillins Anaphylaxis, Hives, Itching and Rash     **Intradermal skin testing negative**  [See intradermal skin testing results from 8/2/2019]      Simvastatin Muscle Pain (Myalgia)     severe  Other reaction(s): Myalgia caused by statin    Methotrexate Other (See Comments)     Other reaction(s): Sore  Sores in mouth, esophagus, and stomach.       Morphine And Codeine Itching and Other (See Comments)     Psych disturbance  Other reaction(s): Confusion, Mood alteration    Quinolones Anxiety, Dizziness, Headache, Other (See Comments), Palpitations and Unknown     Other reaction(s): Hyperactive behavior, Lightheadedness, Mood alteration    Dizzy, light headed    Dizziness, shaky, and jumpy    Capsules, Empty Gelatin [Gelatin]     Carvedilol Diarrhea     Per pt - severe diarrhea and LE swelling  + tolerating Toprol    Lansoprazole Diarrhea    Strawberry Extract     Azithromycin Itching     [See intradermal skin testing results from 8/2/2019]    Bactrim [Sulfamethoxazole-Trimethoprim] Other (See Comments)     Numb mouth, tingling lips (treated with  anti-histamines)    Cephalosporins Itching     [See intradermal skin testing results from 8/2/2019]    Ciprofloxacin Hcl Other (See Comments) and Dizziness     Insomnia, mood lability, Irregular heart beat         Doxycycline Itching and Unknown     [See intradermal skin testing results from 8/2/2019]    Lisinopril Cough    Omeprazole Itching    Tigecycline Other (See Comments)     Perioral numbness in 2025 with long-term use    Tolectin [Nsaids] Rash    Tolmetin Rash and Itching    Tramadol Rash, Hives and Itching             Medications:     Current Facility-Administered Medications:     acetaminophen (TYLENOL) tablet 975 mg, 975 mg, Oral, BID, Jah Bettencourt PA-C, 975 mg at 06/02/25 1011    albuterol (PROVENTIL) neb solution 2.5 mg, 2.5 mg, Nebulization, Q4H PRN, Jah Bettencourt PA-C    [Held by provider] amLODIPine (NORVASC) tablet 5 mg, 5 mg, Oral, Daily, Tess Rodriguez MD, 5 mg at 06/01/25 0741    [Held by provider] apixaban ANTICOAGULANT (ELIQUIS) tablet 2.5 mg, 2.5 mg, Oral, BID, Jah Bettencourt PA-C    calcitRIOL (ROCALTROL) capsule 0.25 mcg, 0.25 mcg, Oral, Daily, Jah Bettencourt PA-C, 0.25 mcg at 06/03/25 1233    calcium carbonate (TUMS) chewable tablet 1,000 mg, 1,000 mg, Oral, Q4H PRN, Jah Bettencourt PA-C, 500 mg at 06/03/25 0304    ceftolozane-tazobactam (ZERBAXA) 1.5 g vial to attach to  ml bag, 1.5 g, Intravenous, Q8H, Negrito Christensen MD, 1.5 g at 06/04/25 0535    glucose gel 15-30 g, 15-30 g, Oral, Q15 Min PRN **OR** dextrose 50 % injection 25-50 mL, 25-50 mL, Intravenous, Q15 Min PRN **OR** glucagon injection 1 mg, 1 mg, Subcutaneous, Q15 Min PRN, Tess Rodriguez MD    diclofenac (VOLTAREN) 1 % topical gel 4 g, 4 g, Topical, 4x Daily, Jah Bettencourt PA-C, 4 g at 06/02/25 1010    diphenhydrAMINE (BENADRYL) elixir 25 mg, 25 mg, Oral or Feeding Tube, Q6H PRN, Aleja Antunez MD, 25 mg at 06/04/25 0540    estradiol (ESTRACE) cream 2 g, 2 g, Vaginal, Once per day on Monday Wednesday  Friday, Jah Bettencourt PA-C, 2 g at 05/30/25 2132    ezetimibe (ZETIA) tablet 10 mg, 10 mg, Oral, At Bedtime, Jah Bettencourt PA-C, 10 mg at 06/03/25 2204    ferrous sulfate (FEROSUL) tablet 325 mg, 325 mg, Oral, At Bedtime, Jah Bettencourt PA-C    folic acid (FOLVITE) tablet 1,000 mcg, 1,000 mcg, Oral, At Bedtime, Jah Bettencourt PA-C, 1,000 mcg at 06/03/25 2204    gabapentin (NEURONTIN) solution 300 mg, 300 mg, Oral, At Bedtime, Jah Bettencourt PA-C, 300 mg at 06/03/25 2204    heparin ANTICOAGULANT injection 5,000 Units, 5,000 Units, Subcutaneous, Q8H, Josefina Perea DO, 5,000 Units at 06/04/25 0542    [Held by provider] hydrALAZINE (APRESOLINE) tablet 50 mg, 50 mg, Oral, TID, Jah Bettencourt PA-C    hydrocortisone sodium succinate PF (solu-CORTEF) injection 25 mg, 25 mg, Intravenous, Q6H, Vivian Russo MD, 25 mg at 06/04/25 0542    insulin aspart (NovoLOG) injection (RAPID ACTING), 1-7 Units, Subcutaneous, TID RUBÉN, Tess Rodriguez MD, 2 Units at 06/03/25 1717    insulin aspart (NovoLOG) injection (RAPID ACTING), 1-5 Units, Subcutaneous, At Bedtime, Tess Rodriguez MD, 1 Units at 06/03/25 2205    levothyroxine (SYNTHROID/LEVOTHROID) tablet 175 mcg, 175 mcg, Oral, QAM AC, Jah Bettencourt PA-C, 175 mcg at 06/03/25 0827    Lidocaine (LIDOCARE) 4 % Patch 2 patch, 2 patch, Transdermal, Q24H, Datakey Josefina, DO, 2 patch at 06/01/25 0740    lidocaine (LMX4) cream, , Topical, Q1H PRN, Jah Bettencourt PA-C    lidocaine 1 % 0.1-1 mL, 0.1-1 mL, Other, Q1H PRN, Jah Bettencourt, PA-C    linezolid (ZYVOX) tablet 600 mg, 600 mg, Oral, Q12H LUZMARIA (08/20), Maribel Landin MD, 600 mg at 06/03/25 2053    melatonin tablet 1 mg, 1 mg, Oral, At Bedtime, Olinda Pereaa, DO    methocarbamol (ROBAXIN) tablet 500 mg, 500 mg, Oral, 4x Daily PRN, Arlene Tang MD    [Held by provider] metoprolol tartrate (LOPRESSOR) half-tab 12.5 mg, 12.5 mg, Oral, BID, Josefina Perea, , 12.5 mg at 05/31/25 8924    multivitamin,  therapeutic (THERA-VIT) tablet 1 tablet, 1 tablet, Oral, Daily, Datario, Josefina, DO, 1 tablet at 06/03/25 1234    naloxone (NARCAN) injection 0.2 mg, 0.2 mg, Intravenous, Q2 Min PRN **OR** naloxone (NARCAN) injection 0.4 mg, 0.4 mg, Intravenous, Q2 Min PRN **OR** naloxone (NARCAN) injection 0.2 mg, 0.2 mg, Intramuscular, Q2 Min PRN **OR** naloxone (NARCAN) injection 0.4 mg, 0.4 mg, Intramuscular, Q2 Min PRN, Aleja Antunez MD    Nutrisource Fiber PO packet 1 each, 1 packet, Oral, Daily, Jah Bettencourt PA-C, 1 each at 06/02/25 1723    nystatin (MYCOSTATIN) suspension 1,000,000 Units, 1,000,000 Units, Oral, 4x Daily, Jah Bettencourt PA-C, 1,000,000 Units at 06/03/25 2052    omega-3 acid ethyl esters (LOVAZA) capsule 1 g, 1 g, Oral, BID, Tess Rodriguez MD, 1 g at 06/03/25 2053    ondansetron (ZOFRAN) injection 4 mg, 4 mg, Intravenous, Q6H PRN, Jah Bettencourt PA-C, 4 mg at 06/03/25 1131    oxyCODONE (ROXICODONE) tablet 5 mg, 5 mg, Oral, Q3H PRN, 5 mg at 06/03/25 1125 **OR** [DISCONTINUED] oxyCODONE IR (ROXICODONE) tablet 10 mg, 10 mg, Oral, Q4H PRN, Datario, Josefina, DO, 10 mg at 05/30/25 1234    [Held by provider] predniSONE (DELTASONE) half-tab 9 mg, 9 mg, Oral, Daily, Luzma Osman, APRN CNP    sennosides (SENOKOT) tablet 2 tablet, 2 tablet, Oral or Feeding Tube, BID, Norah Jaquez, CNP, 2 tablet at 06/03/25 2053    sertraline (ZOLOFT) tablet 50 mg, 50 mg, Oral, Daily, Jah Bettencourt PA-C, 50 mg at 06/03/25 1233    sodium chloride (PF) 0.9% PF flush 3 mL, 3 mL, Intracatheter, Q8H LUZMARIA, Jah Bettencourt PA-C, 3 mL at 06/04/25 0542    sodium chloride (PF) 0.9% PF flush 3 mL, 3 mL, Intracatheter, q1 min prn, Jah Bettencourt PA-C    [START ON 6/5/2025] tacrolimus (GENERIC EQUIVALENT) capsule 0.5 mg, 0.5 mg, Oral, QAM **AND** tacrolimus (GENERIC EQUIVALENT) capsule 1 mg, 1 mg, Oral, QPM, Luzma Osman, APRN CNP    ursodiol (ACTIGALL) tablet 250 mg, 250 mg, Oral, BID, Jah Bettencourt,  "PA-C, 250 mg at 06/03/25 2053    vitamin A 3 MG (02945 UNITS) capsule 20,000 Units, 20,000 Units, Oral, Daily, Al Campbell MD, 20,000 Units at 06/03/25 1233   No current outpatient medications on file.      Physical Exam     Physical Exam     Vital signs:  BP (!) 174/72 (BP Location: Left arm)   Pulse 65   Temp 98.3  F (36.8  C) (Oral)   Resp 16   Ht 1.702 m (5' 7\")   Wt 82.4 kg (181 lb 10.5 oz)   LMP 06/01/1988 (Approximate)   SpO2 96%   BMI 28.45 kg/m      Awake, alert, no acute distress.  Lungs with mild crackles.  Heart irregular rate and rhythm, no murmurs.  Abdomen soft, non-distended, healing surgical incision without erythema or drainage.  Colostomy intact with appropriate output.  Extremities without edema.  No focal neurologic deficits noted on exam.    Data     Data   Laboratory data and imaging listed below was reviewed prior to this encounter.     CT Abdomen Pelvis w/o Contrast (05/29/2025 6:47 AM)     Lab Results   Component Value Date    WBC 11.5 (H) 06/04/2025    WBC 11.5 (H) 06/04/2025    WBC 9.5 06/03/2025            Jose Ramon Lee MD  [facility]  Contact information available via [facility] Paging/Directory     06/04/2025   "

## 2025-06-05 ENCOUNTER — APPOINTMENT (OUTPATIENT)
Dept: OCCUPATIONAL THERAPY | Facility: CLINIC | Age: 75
DRG: 329 | End: 2025-06-05
Payer: MEDICARE

## 2025-06-05 ENCOUNTER — APPOINTMENT (OUTPATIENT)
Dept: PHYSICAL THERAPY | Facility: CLINIC | Age: 75
End: 2025-06-05
Payer: MEDICARE

## 2025-06-05 VITALS
OXYGEN SATURATION: 97 % | TEMPERATURE: 98.6 F | DIASTOLIC BLOOD PRESSURE: 72 MMHG | WEIGHT: 177.03 LBS | BODY MASS INDEX: 27.79 KG/M2 | HEART RATE: 80 BPM | SYSTOLIC BLOOD PRESSURE: 122 MMHG | RESPIRATION RATE: 18 BRPM | HEIGHT: 67 IN

## 2025-06-05 DIAGNOSIS — H40.003 GLAUCOMA SUSPECT OF BOTH EYES: ICD-10-CM

## 2025-06-05 DIAGNOSIS — H35.3113 ADVANCED ATROPHIC NONEXUDATIVE AGE-RELATED MACULAR DEGENERATION OF RIGHT EYE WITHOUT SUBFOVEAL INVOLVEMENT: Primary | ICD-10-CM

## 2025-06-05 LAB
ANION GAP SERPL CALCULATED.3IONS-SCNC: 11 MMOL/L (ref 7–15)
ANNOTATION COMMENT IMP: ABNORMAL
BACTERIA SPEC CULT: ABNORMAL
BACTERIA SPEC CULT: NO GROWTH
BACTERIA SPEC CULT: NORMAL
BUN SERPL-MCNC: 35.2 MG/DL (ref 8–23)
CALCIUM SERPL-MCNC: 8.2 MG/DL (ref 8.8–10.4)
CHLORIDE SERPL-SCNC: 104 MMOL/L (ref 98–107)
CREAT SERPL-MCNC: 1.22 MG/DL (ref 0.51–0.95)
EGFRCR SERPLBLD CKD-EPI 2021: 46 ML/MIN/1.73M2
ERYTHROCYTE [DISTWIDTH] IN BLOOD BY AUTOMATED COUNT: 17.4 % (ref 10–15)
GLUCOSE BLDC GLUCOMTR-MCNC: 131 MG/DL (ref 70–99)
GLUCOSE BLDC GLUCOMTR-MCNC: 155 MG/DL (ref 70–99)
GLUCOSE BLDC GLUCOMTR-MCNC: 156 MG/DL (ref 70–99)
GLUCOSE BLDC GLUCOMTR-MCNC: 169 MG/DL (ref 70–99)
GLUCOSE BLDC GLUCOMTR-MCNC: 188 MG/DL (ref 70–99)
GLUCOSE SERPL-MCNC: 191 MG/DL (ref 70–99)
HCO3 SERPL-SCNC: 29 MMOL/L (ref 22–29)
HCT VFR BLD AUTO: 29.5 % (ref 35–47)
HGB BLD-MCNC: 9.2 G/DL (ref 11.7–15.7)
MAGNESIUM SERPL-MCNC: 2 MG/DL (ref 1.7–2.3)
MCH RBC QN AUTO: 27.5 PG (ref 26.5–33)
MCHC RBC AUTO-ENTMCNC: 31.2 G/DL (ref 31.5–36.5)
MCV RBC AUTO: 88 FL (ref 78–100)
PHOSPHATE SERPL-MCNC: 2.4 MG/DL (ref 2.5–4.5)
PLATELET # BLD AUTO: 288 10E3/UL (ref 150–450)
POTASSIUM SERPL-SCNC: 3.4 MMOL/L (ref 3.4–5.3)
RBC # BLD AUTO: 3.35 10E6/UL (ref 3.8–5.2)
RETINYL PALMITATE SERPL-MCNC: 0.45 MG/L
SODIUM SERPL-SCNC: 144 MMOL/L (ref 135–145)
VIT A SERPL-MCNC: 0.52 MG/L
WBC # BLD AUTO: 11.4 10E3/UL (ref 4–11)

## 2025-06-05 PROCEDURE — 250N000013 HC RX MED GY IP 250 OP 250 PS 637

## 2025-06-05 PROCEDURE — 250N000013 HC RX MED GY IP 250 OP 250 PS 637: Performed by: SURGERY

## 2025-06-05 PROCEDURE — 99233 SBSQ HOSP IP/OBS HIGH 50: CPT | Mod: 24 | Performed by: INTERNAL MEDICINE

## 2025-06-05 PROCEDURE — 250N000013 HC RX MED GY IP 250 OP 250 PS 637: Performed by: NURSE PRACTITIONER

## 2025-06-05 PROCEDURE — 83735 ASSAY OF MAGNESIUM: CPT | Performed by: INTERNAL MEDICINE

## 2025-06-05 PROCEDURE — 97530 THERAPEUTIC ACTIVITIES: CPT | Mod: GO | Performed by: OCCUPATIONAL THERAPIST

## 2025-06-05 PROCEDURE — 36415 COLL VENOUS BLD VENIPUNCTURE: CPT

## 2025-06-05 PROCEDURE — 99233 SBSQ HOSP IP/OBS HIGH 50: CPT | Mod: GC | Performed by: STUDENT IN AN ORGANIZED HEALTH CARE EDUCATION/TRAINING PROGRAM

## 2025-06-05 PROCEDURE — 120N000002 HC R&B MED SURG/OB UMMC

## 2025-06-05 PROCEDURE — 250N000012 HC RX MED GY IP 250 OP 636 PS 637: Performed by: NURSE PRACTITIONER

## 2025-06-05 PROCEDURE — 97110 THERAPEUTIC EXERCISES: CPT | Mod: GO | Performed by: OCCUPATIONAL THERAPIST

## 2025-06-05 PROCEDURE — 250N000011 HC RX IP 250 OP 636: Mod: JZ

## 2025-06-05 PROCEDURE — 87040 BLOOD CULTURE FOR BACTERIA: CPT

## 2025-06-05 PROCEDURE — 85018 HEMOGLOBIN: CPT | Performed by: PHYSICIAN ASSISTANT

## 2025-06-05 PROCEDURE — 97110 THERAPEUTIC EXERCISES: CPT | Mod: GP | Performed by: PHYSICAL THERAPIST

## 2025-06-05 PROCEDURE — 250N000013 HC RX MED GY IP 250 OP 250 PS 637: Performed by: PHYSICIAN ASSISTANT

## 2025-06-05 PROCEDURE — G0545 PR INHRENT VISIT TO INPT/OBS W CNFRM/SUSPCT INFCT DIS BY INFCT DIS SPCIALST: HCPCS | Performed by: INTERNAL MEDICINE

## 2025-06-05 PROCEDURE — 97530 THERAPEUTIC ACTIVITIES: CPT | Mod: GP | Performed by: PHYSICAL THERAPIST

## 2025-06-05 PROCEDURE — 258N000003 HC RX IP 258 OP 636: Performed by: STUDENT IN AN ORGANIZED HEALTH CARE EDUCATION/TRAINING PROGRAM

## 2025-06-05 PROCEDURE — 250N000009 HC RX 250: Performed by: STUDENT IN AN ORGANIZED HEALTH CARE EDUCATION/TRAINING PROGRAM

## 2025-06-05 PROCEDURE — 80048 BASIC METABOLIC PNL TOTAL CA: CPT

## 2025-06-05 PROCEDURE — G0463 HOSPITAL OUTPT CLINIC VISIT: HCPCS

## 2025-06-05 PROCEDURE — 84100 ASSAY OF PHOSPHORUS: CPT | Performed by: STUDENT IN AN ORGANIZED HEALTH CARE EDUCATION/TRAINING PROGRAM

## 2025-06-05 RX ORDER — LORAZEPAM 0.5 MG/1
0.5 TABLET ORAL
Status: COMPLETED | OUTPATIENT
Start: 2025-06-05 | End: 2025-06-06

## 2025-06-05 RX ORDER — LORAZEPAM 2 MG/ML
0.5 INJECTION INTRAMUSCULAR
Status: DISCONTINUED | OUTPATIENT
Start: 2025-06-05 | End: 2025-06-05

## 2025-06-05 RX ADMIN — Medication 20000 UNITS: at 09:18

## 2025-06-05 RX ADMIN — APIXABAN 2.5 MG: 2.5 TABLET, FILM COATED ORAL at 20:04

## 2025-06-05 RX ADMIN — Medication 1 MG: at 18:36

## 2025-06-05 RX ADMIN — NYSTATIN 1000000 UNITS: 100000 SUSPENSION ORAL at 13:11

## 2025-06-05 RX ADMIN — OMEGA-3-ACID ETHYL ESTERS 1 G: 1 CAPSULE, LIQUID FILLED ORAL at 09:19

## 2025-06-05 RX ADMIN — URSODIOL 250 MG: 250 TABLET ORAL at 09:18

## 2025-06-05 RX ADMIN — CALCITRIOL CAPSULES 0.25 MCG 0.25 MCG: 0.25 CAPSULE ORAL at 09:18

## 2025-06-05 RX ADMIN — SODIUM PHOSPHATE, MONOBASIC, MONOHYDRATE AND SODIUM PHOSPHATE, DIBASIC ANHYDROUS 15 MMOL: 142; 276 INJECTION, SOLUTION INTRAVENOUS at 14:16

## 2025-06-05 RX ADMIN — CEFTOLOZANE AND TAZOBACTAM 1.5 G: 1; .5 INJECTION, POWDER, LYOPHILIZED, FOR SOLUTION INTRAVENOUS at 21:35

## 2025-06-05 RX ADMIN — EZETIMIBE 10 MG: 10 TABLET ORAL at 21:35

## 2025-06-05 RX ADMIN — CEFTOLOZANE AND TAZOBACTAM 1.5 G: 1; .5 INJECTION, POWDER, LYOPHILIZED, FOR SOLUTION INTRAVENOUS at 13:11

## 2025-06-05 RX ADMIN — HYDROCORTISONE SODIUM SUCCINATE 12.5 MG: 100 INJECTION, POWDER, FOR SOLUTION INTRAMUSCULAR; INTRAVENOUS at 05:20

## 2025-06-05 RX ADMIN — NYSTATIN 1000000 UNITS: 100000 SUSPENSION ORAL at 20:05

## 2025-06-05 RX ADMIN — LINEZOLID 600 MG: 600 TABLET, FILM COATED ORAL at 09:18

## 2025-06-05 RX ADMIN — FERROUS SULFATE TAB 325 MG (65 MG ELEMENTAL FE) 325 MG: 325 (65 FE) TAB at 21:35

## 2025-06-05 RX ADMIN — NYSTATIN 1000000 UNITS: 100000 SUSPENSION ORAL at 16:23

## 2025-06-05 RX ADMIN — OXYCODONE HYDROCHLORIDE 5 MG: 5 TABLET ORAL at 21:41

## 2025-06-05 RX ADMIN — SERTRALINE HYDROCHLORIDE 50 MG: 50 TABLET ORAL at 09:18

## 2025-06-05 RX ADMIN — SENNOSIDES 2 TABLET: 8.6 TABLET, FILM COATED ORAL at 09:18

## 2025-06-05 RX ADMIN — LEVOTHYROXINE SODIUM 175 MCG: 0.17 TABLET ORAL at 09:18

## 2025-06-05 RX ADMIN — URSODIOL 250 MG: 250 TABLET ORAL at 20:04

## 2025-06-05 RX ADMIN — APIXABAN 2.5 MG: 2.5 TABLET, FILM COATED ORAL at 09:18

## 2025-06-05 RX ADMIN — OMEGA-3-ACID ETHYL ESTERS 1 G: 1 CAPSULE, LIQUID FILLED ORAL at 20:05

## 2025-06-05 RX ADMIN — CEFTOLOZANE AND TAZOBACTAM 1.5 G: 1; .5 INJECTION, POWDER, LYOPHILIZED, FOR SOLUTION INTRAVENOUS at 05:22

## 2025-06-05 RX ADMIN — THERA TABS 1 TABLET: TAB at 09:18

## 2025-06-05 RX ADMIN — LINEZOLID 600 MG: 600 TABLET, FILM COATED ORAL at 20:04

## 2025-06-05 RX ADMIN — Medication 1000 MCG: at 21:36

## 2025-06-05 RX ADMIN — GABAPENTIN 300 MG: 250 SOLUTION ORAL at 21:36

## 2025-06-05 RX ADMIN — Medication 1 MG: at 09:18

## 2025-06-05 ASSESSMENT — ACTIVITIES OF DAILY LIVING (ADL)
ADLS_ACUITY_SCORE: 64
ADLS_ACUITY_SCORE: 64
ADLS_ACUITY_SCORE: 68
ADLS_ACUITY_SCORE: 68
ADLS_ACUITY_SCORE: 64
ADLS_ACUITY_SCORE: 64
ADLS_ACUITY_SCORE: 68
ADLS_ACUITY_SCORE: 64
ADLS_ACUITY_SCORE: 68
ADLS_ACUITY_SCORE: 64
ADLS_ACUITY_SCORE: 68
ADLS_ACUITY_SCORE: 64
ADLS_ACUITY_SCORE: 68
ADLS_ACUITY_SCORE: 64

## 2025-06-05 NOTE — PROGRESS NOTES
Care Management Follow Up    Length of Stay (days): 8    Expected Discharge Date: 06/09/2025     Concerns to be Addressed: discharge planning     Patient plan of care discussed at interdisciplinary rounds: Yes    Anticipated Discharge Disposition: Transitional Care  Anticipated Discharge Services: Transportation Services  Anticipated Discharge DME: None    Patient/family educated on Medicare website which has current facility and service quality ratings: yes  Education Provided on the Discharge Plan:    Patient/Family in Agreement with the Plan: yes    Referrals Placed by CM/SW:  N/A  Private pay costs discussed: insurance costs out of pocket expenses- Cost of home infusion    Discussed  Partnership in Safe Discharge Planning  document with patient/family: Yes: Document provided     Handoff Completed: Yes, [facility] PCP: Internal handoff referral completed    Additional Information:  RNCC Flower Phipps informed NADEEM that the patient reported she would not be able to afford the cost of the home IV abx - roughly $246.27/day through [employer]. Patient is reportedly agreeable to TCU to compleet IV abx.    SW confirmed with [facility] liaison Gayatri Oliva that the patient did not sign a bed hold with [facility] ([facility]).    SW met with the patient at bedside to further discuss TCU. Patient stated that she was informed by a provider from one of the teams that it still has not yet been decided if she will discharge on IV abx or oral abx. Patient stated if she is able to discharge on oral then she would want to discharge home. If she needed to discharge on IV abx then she would be agreeable to TCU. Patient stated she is hoping someone will provide her with an answer in the next coming days.   Patient was agreeable to SW leaving a list of TCU facilities near the patient's home zip code, along with the partnership in safe discharge planning document and a bridge from hospital to home document.     Next Steps: NADEEM will  follow up with team in the coming days regarding the final abx plan.  SW will continue to follow for discharge planning and possible placement.     NAINA Hill  [address]   Office: [phone]  [email or web address]  Securely message with Zachariah (Search Name or 7C Med Surg [phone] SW)  Text page via [facility] Paging/Directory   See signed in provider for up to date coverage information  Social Work & Care Management Department

## 2025-06-05 NOTE — PROGRESS NOTES
Care Management Follow Up    Length of Stay (days): 8    Expected Discharge Date: 06/09/2025     Concerns to be Addressed: discharge planning     Patient plan of care discussed at interdisciplinary rounds: Yes    Anticipated Discharge Disposition: Transitional Care vs home care with IV abx     Anticipated Discharge Services: Transportation Services  Anticipated Discharge DME: None    Patient/family educated on Medicare website which has current facility and service quality ratings: yes  Education Provided on the Discharge Plan:    Patient/Family in Agreement with the Plan: yes    Referrals Placed by CM/SW:    Private pay costs discussed: Yes- home IV abx    Discussed  Partnership in Safe Discharge Planning  document with patient/family: No     Handoff Completed: Yes, [facility] PCP: Internal handoff referral completed    Additional Information:  Received call from Roz at [facility] that for prior authorization the MD needs to fill out prior auth paperwork. Directed to Felisha to clarify. Felisha reports [employer] to get form. Form printed and in patient hard chart. Dr. Christensen was notified of need to sign and fill out form.     Felisha at [facility]:  Ph:[phone]  Fax:[phone]  Patient updated on [facility] estimated cost for home IV abx at $246.27 per day. Patient reports she can not afford out of pocket expense and will look at TCU.  to provide patient with list.     Next Steps:     IMM  IHO completed for TCU discharge, if home with HC discharge, needs new IHO.  Check with patient on preference to TCU and place referral is she would prefer TCU. Patient was given list.   Addendum: 2374- Dr. Christensen signed prior authorization form and was faxed to Felisha.     Care Management will continue to monitor progression of care, review team recommendations, and provide discharge planning assistance as needed.       Flower Phipps, RN, BSN  [facility]  Inpatient Care Management - FLOAT  Covering [address] [place] [address]  & [facility] ([phone]) Saturday & Sunday; ([phone]) [facility] Recognized Holidays     Units: 5A Onc Vocera & 5C Vocera   Units: 6B Vocera & 6C Vocera   Units: 7A SOT RNCC Vocera, 7B Med Surg Vocera, & 7C Med Surg Vocera  Units: 6A Vocera & 4A CVICU Vocera, 4C MICU Vocera, and 4E SICU Vocera   Units: 5 Ortho Vocera & 5 Med Surg Vocera    Units: 6 Med Surg Vocera & 8 Med Surg Vocera

## 2025-06-05 NOTE — PROGRESS NOTES
[facility]    Progress Note - Medicine Service, JOVANNY TEAM 3       Date of Admission:  5/28/2025    Assessment & Plan   Luz Thompson is a 75 year old female who was admitted on 5/28/2025 with past medical history of atrial fibrillation, DVT on eliquis, CVA, CKD III, HFpEF (TTE 3/9/25 EF 60-65%), T2DM, biliary cirrhosis s/p liver transplant in 2002, EBV, HTN, HLD, Sjogren's syndrome, Basal Cell Carincoma s/p MOHS on 4/8/25, thyroid cancer s/p thyroidectomy in 2010, Sjogren's syndrome, and diverticulitis with recent admission from 4/14-4/23 for ESBL UTI & E. Faecalis bacteremia related to sigmoid microperforation (treated with IV ertapenem) who is admitted for abdominal pain and hypotension, found to have smoldering diverticulitis with 4.1cm abscess s/p open sigmoidectomy and end colostomy on 5/30/25, with Psuedomonas positive blood cultures drawn 5/28.  Ongoing IV antibiotics due to bacteremia (GPC and VRE) and weaning off stress dose steroids.    Today:  - Bcx clear as of 6/3. Total 7-day Abx course through 6/9, no oral options available  - MRI pending   - Transition to prednisone, weaned off IV hydrocortisone     # Diverticulitis c/b abscess s/p open sigmoidectomy with end colostomy 5/30/25  # Sepsis 2/2 Klebsiella pneumoniae and Pseudomonas aeruginosa bacteremia 5/28, likely of intra-abdominal origin   # Hypotension 2/2 sepsis  Patient now hypertensive, after coming off pressors on 5/30. Currently with ongoing IV steroid taper, will plan to stop continuous maintenance fluids. Will hold PTA antihypertensives while titrating and resume when able.  Transitioned from meropenem to ceftolozane-tazobactam due to resistance (psuedomonas coverage) and switch vancomycin to linezolid iso DERREK for coverage of previous VRE E Facecium.  From ID perspective, there is no good oral antibiotic options and patient will complete 7-day IV antibiotic course at the hospital (first  day of blood culture clearance 6/3).  - CRS consulted   - ADAT  - subcutaneous heparin for DVT ppx, continue to hold eliquis for now  - Hx of liver transplant - immunosuppression per transplant immunology team   - High dose Vitamin A x30 days post-op for wound healing support in setting of immunosuppression  - senokot BID  - Continue WOC ostomy cares   - Continue PATY drain   - Encourage out of bed and ambulation, incentive spirometer use   - Transplant ID following  - meropenem (5/31- 6/3) for pseudomonas coverage  - linezolid (6/1 -6/3)  for enterococcal bacteremia  - Ceftolozane-Tazobactam (6/3-6/9), for a 7-day course (Bcx clear from 6/3)   - Pain regimen:   - Scheduled: Robaxin QID, tylenol 975mg BID, gabapentin 300mg QHS, Lidocaine patches daily, Diclofenac QID  -  PRN: PO oxycodone 5mg  - Blood cultures drawn 5/28 positive for Klebsiella and Pseudomonas  - Bcx (5/31): E Faecium VRE  - Bcx (6/2): GPC in pairs and chains   - Bcx (6/3): NGTD   - Resume PTA Prednisone 9 mg PO daily  - If decreasing back to IS dosing, would pause on pred 10 until tac level therapeutic (goal 3-5)   - hold PTA amlodipine while tapering fluids and IV steroids    #C/f TIA   In the morning of 6/4, patient states that she has had speech problems in the last couple of days since the surgery.  She describes that she has trouble getting her words out and that her speech does not feel as fluent as it was previously.  She denies any new numbness or tingling.  No headache or changes in vision.  Nonfocal on exam.  CT head negative for intracranial bleed or other acute process.  TTE with bubble also negative.  Certainly possible that patient may have had a TIA following surgery as anticoagulation had been held.  Will proceed with MRI brain for further workup.  - Continue PTA Xarelto  - Follow-up Brain MRI with Ativan premedication (6/5)    # DERREK on CKD3, resolved   # Bilateral stent placement per urology 5/29  # ATN  DERREK in the setting of  hypotension, shock, nephrotoxic meds which is now resolved after IVF and improved PO after surgery.   - Baseline creatinine 1.3-1.6  - external catheter   - PTA estradiol vaginal  cream    # Primary biliary cirrhosis s/p liver transplant 2002  # EBV +  # Reactive Leukocytosis   # Sjogren's syndrome  - Transplant Hepatology consulted  - No sirolimus  - Resumed PTA Prednisone 9 mg PO   - Tac 1 mg po BID per transplant hep   - ordered suspension formulation instead of capsule due to gelatin allergy  - CMP in AM  - PTA Nystatin QID  - PTA Ursodiol     # Acute post-op pain  # high risk delirium   - delirium precaution   - Monitor neurological status. Delirium preventions and precautions.   - pain regimen as above  - PTA sertraline  - melatonin Q HS  - PRN narcan    # Protein calorie deficit malnutrition    - Nutrition consulted  - Supplements: Gelatin Plus TID with meals + PRN per patient request  - Thera-Vit 1 tablet daily   - Vitamin A level (ordered)    # Post operative ventilatory support, resolved  # Acute hypoxic respiratory support   Currently on 1L NC.  - Supplemental oxygen to keep saturation above 92 %.  - PRN albuterol     # Paroxysmal A-fib on apixaban   # Hypertension  # HFpEF (TTE 3/9/25 EF 60-65%)   # history of CVA  # Vasoplagia  - Monitor hemodynamic status.  - Levophed off since 0400 5/31  - MAP goal >65  - PTA ezetimibe  - holding PTA amlodipine 5mg BID  - Continue PTA apixaban 2.5mg BID  - Holding metoprolol tartrate 12.5 mg BID  - holding hydralazine, when reordering would prefer 2 25 mg tabs   - SBP >100 or MAP >65  - weaning steroids as above     # Type 2 diabetes  # Stress hyperglycemia    - MDSSI  - Goal to keep BG< 180 for optimal wound healing   - Glucose AC/HS  - hypoglycemic protocol  - On prednisone, transitioned from hydrocortisone     # Thyroid cancer status post thyroidectomy 2010  - PTA calcitriol 0.25mcg  - PTA levothyroxine 175mcg    # Acute blood loss anemia    # Anemia of critical  "illness  # coagulopathy due to cirrhosis and surgical blood loss    # thrombocytopenia   # anemia of critical illness   - Transfuse if hgb <7.0 or signs/symptoms of hypoperfusion. Monitor and trend.   - PTA Ferous sulfate, folic acid     # Weakness and deconditioning of critical illness   - Physical and occupational therapy consult   - up with assist, fall precaution     # Basal cell carcinoma status post Mohs April 2025        Diet: Snacks/Supplements Adult: Other; PRN per pt request; With Meals  Snacks/Supplements Adult: Other; Offer pt Gelatein Plus or 20 TID with meals - please ask what flavor she prefers with each meal; With Meals  Full Liquid Diet  Calorie Counts    DVT Prophylaxis: Xarelto  Ron Catheter: Not present  Fluids: PO  Lines: PIV      Cardiac Monitoring: None  Code Status: Full Code      Clinically Significant Risk Factors          # Hyperchloremia: Highest Cl = 110 mmol/L in last 2 days, will monitor as appropriate          # Hypoalbuminemia: Lowest albumin = 2.4 g/dL at 5/29/2025  3:15 PM, will monitor as appropriate    # Acute Kidney Injury, unspecified: based on a >150% or 0.3 mg/dL increase in last creatinine compared to past 90 day average, will monitor renal function  # Hypertension: Noted on problem list            # Overweight: Estimated body mass index is 28.45 kg/m  as calculated from the following:    Height as of this encounter: 1.702 m (5' 7\").    Weight as of this encounter: 82.4 kg (181 lb 10.5 oz)., PRESENT ON ADMISSION     # Financial/Environmental Concerns: none         Social Drivers of Health   Tobacco Use: Medium Risk (5/27/2025)    Patient History     Smoking Tobacco Use: Former     Smokeless Tobacco Use: Never   Physical Activity: Insufficiently Active (11/9/2023)    Received from [employer]    Exercise Vital Sign     Days of Exercise per Week: 5 days     Minutes of Exercise per Session: 10 min        The patient's care was discussed with the Attending Physician,  " Karan.    Negrito Christensen MD   Internal Medicine PGY-1  Medicine Service, MAROON TEAM 3  [facility]  Securely message with [employer] (more info)  Text page via [employer] Paging/Directory   See signed in provider for up to date coverage information  ______________________________________________________________________    Interval History   NAEO.  Patient states that her speech feels more coherent today.  Denies any new numbness or tingling.  No motor deficits.  Got claustrophobic during MRI yesterday which prevented completion of the study.  Amendable to reattempting MRI with premedication.  Otherwise no new symptoms or complaints and family member updated at bedside.    Physical Exam   Vital Signs: Temp: 98.3  F (36.8  C) Temp src: Oral BP: (!) 168/89 Pulse: 61   Resp: 10 SpO2: 96 % O2 Device: Nasal cannula Oxygen Delivery: 1 LPM  Weight: 181 lbs 10.54 oz    General Appearance: NAD, resting comfortably in bed, alert and oriented  Respiratory: CTAB, normal work of respiration  Cardiovascular: RRR, no m/r/g  GI: Soft, nontender, nondistended, ostomy with moderate output, PATY drain with serosanguinous output, dressings CDI  MSK/Skin: no rashes on exposed skin, moving all extremities  Other: No focal neuro-deficits, strength 5/5 bilaterally. CN intact, no extraocular movement deficits

## 2025-06-05 NOTE — PROGRESS NOTES
Calorie Count  Intake recorded for: 6/4  Total Kcals: 772 Total Protein: 41g  Kcals from Hospital Food: 772  Protein: 41g  Kcals from Outside Food (average):0 Protein: 0g  # Meals Ordered from Kitchen: 3 small meals   # Meals Recorded: 2 meals (First - 100% scrambled eggs w/ cheese, 75% mashed potatoes w/ gravy, 50% coffee w/ cream)      (Second - 50% cream of wheat w/ 2 brown sugars, Greek yogurt, coffee w/ cream)   # Supplements Recorded: 100% 2 Glucernas, 1 Nutrisource Fiber pkt

## 2025-06-05 NOTE — PLAN OF CARE
Goal Outcome Evaluation:      Plan of Care Reviewed With: patient    Overall Patient Progress: no changeOverall Patient Progress: no change  Shift Hours: 1500 - 2300    Assessment:  Body systems that were not at patient's baseline Respiratory, Cardiac, Gastrointestinal, and Skin. Focused body system assessments documented in flowsheets.        Activity     Fall Risk Score: 60   Bed alarm on? Yes     Activity Assistance Provided: assistance, 2 people      Assistive Device Utilized: lift device    Pain: denies    Labs/RN Managed Protocols: K, Mg, Phos rechecks tomorrow    Lines/Drains: R L PIV SL     Nutrition: Reg, vijay counts (poor)    Goal Outcome Evaluation  Plan of Care Reviewed With: patient  Overall Patient Progress: no change   A&O VSS on 1-2L O2 (desat on RA). Cough intermittent, productive. MRI stopped due to cough and movement. Tele (a fib). MIdline incision open to air. Colostomy CDI, next change Thursday (WOC). Continue IV abx, may discharge with this. Swallows some meds one at a time and others crush with applesauce. Not OOB, lift.     Barriers to Discharge:   Needs teaching with daughter present colostomy care. SW notes states could go home with home care, however pt is a lift at this time.

## 2025-06-05 NOTE — PROGRESS NOTES
[facility]    Progress Note - Colorectal Surgery Service       Date of Admission:  5/28/2025    Assessment & Plan: Surgery   Mrs. Thompson is a 75 year old female with a complex PMH, some of which includes CKD stage 3, CVA and DVT on anticoag, atrial fibrillation, DM2, biliary cirrhosis s/p prior liver transplant admitted for smoldering diverticulitis with 4.1cm abscess on prolonged IV abx as an outpatient.  Was admitted on 5/29 with worsening abdominal pain, hypotension due to gram negative septicemia, diarrhea.  Now s/p exploratory laparotomy, sigmoidectomy and end colostomy on 5/30/25, recovering appropriately with return of bowel function.    Polymicrobial bacteremia likely 2/2 GI source. Having return of bowel function.     - Reg diet.  Recommend protein nutritional supplements as able.    - SQH, restarted eliquis on 6/4  - will remove vessel loop in incision  - Appreciate ID recommendations (now on Zebraxa)  - Hx of liver transplant - immunosuppression per transplant immunology team (changed from sirolimus to tac).  On IV hydrocort taper for stress dosing.   - High dose Vitamin A x30 days post-op for wound healing support in setting of immunosuppression  - Getting senokot BID  - Continue WOC ostomy cares   - Continue PATY drain   - Encourage out of bed and ambulation, incentive spirometer use      Diet: Snacks/Supplements Adult: Other; PRN per pt request; With Meals  Snacks/Supplements Adult: Other; Offer pt Gelatein Plus or 20 TID with meals - please ask what flavor she prefers with each meal; With Meals  Room Service  Advance Diet as Tolerated: Regular Diet Adult; Regular Diet Adult  Snacks/Supplements Adult: Glucerna; Between Meals    DVT Prophylaxis: SQH  Ron Catheter: Not present  Lines: None       Drains: PRESENT         - 1 Closed/Suction Drain(s)    - 1 Ureteral Drain/Stent(s)   Cardiac Monitoring: ACTIVE order. Indication: Bradycardias (48 hours)  Code  "Status: Full Code      Clinically Significant Risk Factors          # Hyperchloremia: Highest Cl = 108 mmol/L in last 2 days, will monitor as appropriate          # Hypoalbuminemia: Lowest albumin = 2.4 g/dL at 5/29/2025  3:15 PM, will monitor as appropriate       # Hypertension: Noted on problem list            # Overweight: Estimated body mass index is 28.45 kg/m  as calculated from the following:    Height as of this encounter: 1.702 m (5' 7\").    Weight as of this encounter: 82.4 kg (181 lb 10.5 oz).        # Financial/Environmental Concerns: none         Social Drivers of Health   Tobacco Use: Medium Risk (5/27/2025)    Patient History     Smoking Tobacco Use: Former     Smokeless Tobacco Use: Never   Physical Activity: Insufficiently Active (11/9/2023)    Received [facility]    Exercise Vital Sign     Days of Exercise per Week: 5 days     Minutes of Exercise per Session: 10 min         Disposition Plan     Pending tolerance of advancement of diet and return of bowel function      Patient was seen and discussed with colorectal fellow, Dr. Montano, who discussed with colorectal staff, Dr. Shannan Silva MD  General Surgery Resident  [phone]      Non-urgent messages: Securely message with [employer] (more info)  Text page via [employer] Paging/Directory     ______________________________________________________________________    Interval History   Had a stroke code called yesterday afternoon due to dysarthria. She had an episode of emesis yesterday afternoon after eating too big of a lunch. CT Head was (-). Patient was restarted on eliquis yesterday evening.     Physical Exam   Vital Signs: Temp: 98.4  F (36.9  C) Temp src: Oral BP: (!) 155/76 Pulse: 72   Resp: 16 SpO2: 97 % O2 Device: Nasal cannula Oxygen Delivery: 1 LPM  Weight: 181 lbs 10.54 oz    Intake/Output Summary (Last 24 hours) at 6/5/2025 0657  Last data filed at 6/5/2025 0623  Gross per 24 hour   Intake 400 ml   Output 3925 ml   Net " -3525 ml      General Appearance: Alert and oriented, no acute distress  Respiratory: no increased WOB   Cardiovascular: regular rate   GI: soft, nondistended, non-tender to palpation around ostomy in LLQ, ostomy pink and well perfused with gas as well as liquid stool, incision clean/dry/intact with jyoti, PATY with more serous output   Skin: no rashes    Data     I have personally reviewed the following data over the past 24 hrs:    11.4 (H)  \   9.2 (L)   / 288     144 104 35.2 (H) /  191 (H)   3.4 29 1.22 (H) \     ALT: N/A AST: N/A AP: N/A TBILI: N/A   ALB: N/A TOT PROTEIN: N/A LIPASE: N/A       Imaging results reviewed over the past 24 hrs:   Recent Results (from the past 24 hours)   Echo Complete   Result Value    LVEF  60-65%    Narrative    [id number]  [id number]  [id number]  [id number]^JOEL^SRAVANI^S     [facility],[place]  Echocardiography Laboratory  [address] [place] [id number]     Name: ISA CAMARGO  MRN: [id number]  : 1950  Study Date: 2025 10:58 AM  Age: 75 yrs  Gender: Female  Patient Location: [facility]  Reason For Study: Cerebrovascular Incident, Endocarditis  Ordering Physician: SRAVNAI MALDONADO  Performed By: Sharath Frausto     BSA: 1.9 m2  Height: 67 in  Weight: 181 lb  BP: 153/67 mmHg  ______________________________________________________________________________  Procedure  Bubble Echocardiogram with two-dimensional, color and spectral Doppler.  ______________________________________________________________________________  Interpretation Summary  Global and regional left ventricular function is normal with an EF of 60-65%.  Global right ventricular function is normal.  Mild aortic insufficiency is present.  The atrial septum is intact as assessed by agitated saline bubble study .  This study was compared with the study from 3/10/25 .  No significant changes  noted.  ______________________________________________________________________________  Left Ventricle  Left ventricular size is normal. Global and regional left ventricular function  is normal with an EF of 60-65%. Relative wall thickness is increased  consistent with concentric remodeling. Left ventricular diastolic function is  indeterminate.     Right Ventricle  The right ventricle is normal size. Global right ventricular function is  normal.     Atria  Both atria appear normal. The atrial septum is intact as assessed by agitated  saline bubble study .     Mitral Valve  Moderate mitral annular calcification is present. Mild mitral insufficiency is  present.     Aortic Valve  Aortic valve sclerosis is present. Mild aortic insufficiency is present.     Tricuspid Valve  The tricuspid valve is normal. Trace tricuspid insufficiency is present. The  right ventricular systolic pressure is approximated at 29.4 mmHg plus the  right atrial pressure.     Pulmonic Valve  The pulmonic valve is normal. Trace pulmonic insufficiency is present.     Vessels  The inferior vena cava cannot be assessed. The aorta root is normal.     Pericardium  No pericardial effusion is present.     Compared to Previous Study  This study was compared with the study from 3/10/25 . No significant changes  noted.  ______________________________________________________________________________  MMode/2D Measurements & Calculations  IVSd: 1.1 cm  LVIDd: 3.7 cm  LVIDs: 2.7 cm  LVPWd: 0.96 cm  FS: 28.5 %  LV mass(C)d: 115.4 grams  LV mass(C)dI: 59.6 grams/m2  Ao root diam: 3.0 cm  LVOT diam: 1.8 cm  LVOT area: 2.7 cm2  Ao root diam index Ht(cm/m): 1.8  Ao root diam index BSA (cm/m2): 1.6  LA Volume (BP): 52.2 ml     LA Volume Index (BP): 26.9 ml/m2  RV Base: 2.8 cm  RWT: 0.52  TAPSE: 2.0 cm     Doppler Measurements & Calculations  MV E max carola: 121.0 cm/sec  MV A max carola: 59.7 cm/sec  MV E/A: 2.0  MV dec time: 0.17 sec  Ao V2 max: 142.0 cm/sec  Ao max  P.1 mmHg  Ao V2 mean: 101.0 cm/sec  Ao mean P.0 mmHg  Ao V2 VTI: 23.0 cm  MAYUR(I,D): 1.6 cm2  MAYUR(V,D): 1.6 cm2  LV V1 max PG: 3.0 mmHg  LV V1 max: 87.0 cm/sec  LV V1 VTI: 14.1 cm  SV(LVOT): 37.6 ml  SI(LVOT): 19.4 ml/m2  PA V2 max: 98.8 cm/sec  PA max PG: 3.9 mmHg  PA acc time: 0.08 sec  TR max nate: 270.9 cm/sec  TR max P.4 mmHg  AV Nate Ratio (DI): 0.61  MAYUR Index (cm2/m2): 0.84  E/E' avg: 15.0     Lateral E/e': 13.3  Medial E/e': 16.8  RV S Nate: 11.5 cm/sec     ______________________________________________________________________________  Report approved by: Fermín Aragon MD on 2025 01:36 PM         CT Head w/o Contrast    Narrative    CT HEAD W/O CONTRAST 2025 12:45 PM    History: Changes in speech, check for acute intracranial pathology   ICD-10:    Comparison: CT and MRI from 2018    Technique: Using multidetector thin collimation helical acquisition  technique, axial, coronal and sagittal CT images from the skull base  to the vertex were obtained without intravenous contrast.   (topogram) image(s) also obtained and reviewed.    Findings: There is no intracranial hemorrhage, mass effect, or midline  shift. Gray/white matter differentiation in both cerebral hemispheres  is preserved. Ventricles are proportionate to the cerebral sulci  patchy periventricular and subcortical white matter hypoattenuation is  nonspecific, but likely represents chronic small vessel ischemic  disease in patient this age. Mild generalized cerebral atrophy. The  basal cisterns are clear.    The bony calvaria and the bones of the skull base are normal. The  visualized portions of the paranasal sinuses and mastoid air cells are  clear.      Impression    Impression:    1. No acute intracranial pathology.  2. Moderate Leukoaraiosis and mild generalized cerebral atrophy.    I have personally reviewed the examination and initial interpretation  and I agree with the findings.    HO TRIMBLE MD          SYSTEM ID:  [id number]

## 2025-06-05 NOTE — PLAN OF CARE
Goal Outcome Evaluation:    Plan of Care Reviewed With: patient    Overall Patient Progress: no change  Overall Patient Progress: no change    Assumed cares from [id number]. Pt is alert and oriented. Vitally stable on 1-2L NC. Tele reading Afib. R PATY drain in place. Colostomy and purewick in place. Midline abdominal incision TATO and approximated. Not oob during shift.

## 2025-06-05 NOTE — PROGRESS NOTES
CLINICAL NUTRITION SERVICES - BRIEF NOTE    Calorie counts:           Total Kcals: 0           Total Protein: 0g   6/2         Total Kcals: 350       Total Protein: 20g   6/3         Total Kcals: 626       Total Protein: 32g   6/4         Total Kcals: 772       Total Protein: 41g   Av kcals(26% of low end of estimated needs), 23 g protein(29% of low end of estimated needs)    Intake appears to be slowly improving  INTERVENTIONS  RECOMMENDATIONS FOR MDs/PROVIDERS TO ORDER:  None currently, Vit A supplementation per MD, could consider checking status   If PO intake declines, could consider reordering kcal counts and considering nutrition support    Registered Dietitian Interventions:  Encouraged small, frequent meals/snacks with high protein sources  Continue MVI(consider chewable option as needed)  Encourage adequate hydration  Trial Glucerna Therapeutic Nutrition Shake contains 220 kcal, 10 grams protein, 26 grams carbohydrate, 9 grams fat, 4 grams fiber per 8 fl oz.   Additional ONS/snack PRN     Future/Additional Recommendations:  Monitor nutrition related findings and follow up per protocol    Implementation  Monitoring    Jane Burks MS, RD, LD, CNSC    6C (beds 4179-0937) + 7C (beds 1771-2079) + ED + Obs  Available in Vocera by name or unit dietitian

## 2025-06-05 NOTE — PROGRESS NOTES
[facility] Nurse Inpatient Assessment     Consulted for: new end colostomy    Summary: 5/30 - Pt's daughter Gloria at bedside for pouch change demo, would like to be present for education    6/3 - daughter not here - stool present in pouch  6/4: daughter not at bedside- unable to make it to hospital today. Pouch intact. Pt too tired for education today. Pt due for pouch change Thursday.   6/5: met with daughter and pt at bedside, pt able to use coloplast pouch closure much better than the manny one, pt able to remove pouch with assist. Daughter able to complete the rest of the pouch change with prompting. Placed convex coloplast pouch today but not sure if this is the correct fit, ordered some flat coloplast pouches with convex oval ring to try for tomorrow, plan to meet with daughter at 2pm tomorrow for continued teaching.     [facility] nurse follow-up plan: daily     Patient History (according to provider note(s):      75 year old female with history of liver transplant admitted for smoldering diverticulitis with 4.1cm abscess s/p open sigmoidectomy and end colostomy on 5/29/25,     Assessment:      Areas visualized during today's visit: Focused: Abdomen      6/2 LLQ    Assessment of new end Colostomy:  Diagnosis Pertinent to Stoma: Diverticulitis with perforation      Surgery Date: 5/29/25  Surgeon: Al Campbell MD       Hospital: [facility]  Pouching system in place on assessment today: [place] two piece, cut to fit, soft convex, barrier ring, and skin prep   Pouch last changed/wear time: 3days   Reason for pouch change today: ostomy education and routine schedule  Effectiveness of current pouching/ supply plan: Attempting new system, will re-evaluate next assessment  Change made with ostomy management today: Yes  Pouching system placed today: Coloplast one piece convex, barrier ring, and skin prep   Supplies: ordered one piece convex pouches and discussed with  patient   Last photo: 6/2/25  Stoma location: LLQ  Stoma size: 38 mm, oval  Stoma appearance: dusky at superior aspect, edematous, retracting   Mucocutaneous junction: separation at 11-1 o clock and 6 - 9 o clock, about 1cm in depth   Peristomal complication(s): within a crease    Output: watery brown stool  Output volume emptied during visit: 50ml   Abdominal assessment: Soft  Surgical site(s): dressing dry and intact  NG still in place? No  Pain: denies  Is patient still on a PCA? No    Ostomy education assessment:  Participant of teaching session today: patient   Education completed today: Pouching system assessment , Adjustment of pouching plan, Pouch change return demonstration, Pouch emptying return demonstration, Fluid and electrolyte balance , Importance of hydration, When to seek medical attention, Low fiber diet , Odor/flatus management , Infection prevention/hygiene , Hernia prevention, and Lifestyle adjustments   Educational materials/methods: Verbal, Demonstration, [place] [place] education hand out, Product sample, Addressed patient/caregiver questions/concerns, and Left packet of information at bedside   Education still needed: Pouch change return demonstration and Discharge instructions-- additional pouch change practice   Learning Comprehension:   Psychosocial assessment: Pt says she wants to be able to change pouch after discharge - wants to return to independent living apartment with some assistance (was in TCU prior to this admission). Daughter can assist after discharge if needed, wants to be present for teaching  Patient readiness for education today: attentive and active participation,   Following today's visit: patient  and family member daughter is able to demonstrate;         1. How to empty their pouch? Yes, with moderate assist, and Needs additional practice         2. How to change their pouch? Yes, with moderate assist, and Needs additional practice          Preparation for discharge  "completed: No discharge preparation started yet-- still adjusting pouching plan   Preparation for discharge still needed: Placed prescription recommendations in discharge navigator for MD to sign, Ensured patient has extra supplies for discharge, Discussed how to order supplies after discharge , Ordered samples from  after gaining consent from patient/caregiver, Discussed how and when to make an outpatient [facility] nurse appointment after discharge, Prepared for discharge home with home care, and Discuss signs/symptoms of when to seek medical attention  Pt support system on discharge: Daughter Gloria  [facility] recommend home care? By WOC RN if possible  Face to face time: > 60 minutes    Treatment Plan:     LLQ Colostomy pouching plan:   Pouching system: ostomy supplies pouches: [place] 57 FECAL ([id number]) ostomy supplies barrier: [place] 57mm SOFT CONVEX ([id number])  Accessories used: [facility] ostomy accessories: 2\" Cera Barrier Ring ([id number]) and Cavilon no sting barrier film ([id number])   Frequency of pouch changes: Twice weekly and PRN leakage  WO follow up plan: Daily Monday-Friday (as able)  Bedside RN interventions: Change pouch PRN if leaking using the supplies above, Empty pouch when 1/3 to 1/2 full, ensure to clean pouch outlet after emptying to prevent odor, Notify WOC for ongoing pouch leakage, Document stoma appearance and output volume, color, and consistency every shift, Encourage patient to empty pouch with assist, and Assist patient to measure and record output     Orders: Reviewed      DATA:     Current support surface: Standard    Containment of urine/stool: Suction based external urinary catheter  and Colostomy pouch  BMI: Body mass index is 28.28 kg/m .   Active diet order: Orders Placed This Encounter      Clear Liquid Diet     Output: I/O last 3 completed shifts:  In: 1756.64 [P.O.:270; I.V.:1436.64; IV Piggyback:50]  Out: 1680 [Urine:1250; Drains:430]     Labs:   Recent Labs   Lab 05/30/25  0282 " 05/29/25  1515 05/29/25  1051 05/29/25  0546   ALBUMIN  --  2.4*  --   --    HGB 8.6* 8.5*   < > 7.6*   INR  --   --   --  1.49*   WBC 20.2* 20.2*  --  9.1   A1C  --  6.3*  --   --     < > = values in this interval not displayed.     Pressure injury risk assessment:   Sensory Perception: 2-->very limited  Moisture: 3-->occasionally moist  Activity: 1-->bedfast  Mobility: 2-->very limited  Nutrition: 2-->probably inadequate  Friction and Shear: 2-->potential problem  Shashank Score: 12      Pager no longer is use, please contact through [facility] group: [facility] Nurse [place]   Dept. Office Number: [phone]

## 2025-06-05 NOTE — PROGRESS NOTES
Transplant Infectious Diseases Inpatient Consultation      Luz Thompson MRN# [id number]   YOB: 1950 Age: 75 year old   Date of Admission and time: 5/28/2025 12:43 PM     Reason for consult: indwelling lines, infection management           Active Problems and Infectious Diseases Issues:     Polymicrobial bacteremia:  CR - Pseudomonas aeruginosa (on ceftolozane-tazobactam since 6/3)  Klebsiella pneumoniae (on ceftolozane-tazobactam since 6/3)  Vancomycin-Resistant Enterococcus faecium (on linezolid, cultures from 6/3 remain negative)  Post-operative course s/p open sigmoidectomy and end colostomy (5/30/25) for diverticulitis with abscess.  Immunocompromised state s/p liver transplant (2002) on tacrolimus.  History of ESBL UTI and E. faecalis bacteremia (April 2025, completed treatment).        Impression and Recommendations:     Ms. Thompson is a 75-year-old liver transplant recipient (2002, biliary cirrhosis) on tacrolimus, currently s/p open sigmoidectomy and end colostomy (5/30/25) for diverticulitis with abscess. Her early post-operative course has been complicated by polymicrobial bacteremia with CR-Pseudomonas aeruginosa, Klebsiella pneumoniae, and VR Enterococcus faecium (VRE). Blood cultures cleared on 6/3/25.    She is doing much better today. She is currently on ceftolozane-tazobactam (MARYJANE <=2 for both CR-PA and K. pneumoniae) and oral linezolid (MARYJANE 2 for VRE). She remains afebrile, hemodynamically stable, and without clinical signs of persistent infection. TTE was negative for endocarditis. Given adequate source control, appropriate antimicrobial coverage, and clinical response, we recommend completing 7 days of therapy from 6/3/25 if cultures remain negative.     Recommendations:    Continue ceftolozane-tazobactam for CR-PA and Klebsiella. Her cultures have been negative, the first day of effective therapy is 6/3.   Continue linezolid for VRE bacteremia. Given stable course and  source control, ok to continue oral formulation (excellent bioavailability). The day of culture clearance is 6/3.  Will aim for shorter duration of therapy per BALANCE trial results (7 days).     Transplant infectious diseases will continue to follow.      Discussed with the attending on service, Dr. Isidro.     Jose Ramon Lee MD  Infectious Diseases Fellow         HPI & Synopsis of Clinical Presentation and Course   Transplant Summary  Transplants:  5/22/2002 (Liver); POD  8415.  Coordinator Angelika Frausto  Reason for Transplantation: Biliary cirrhosis  Current Immunosuppression: Tacrolimus suspension 0.5 mg PO BID. (Prednisone 9mg daily currently held).    Luz Thompson is a 75-year-old woman with a history of biliary cirrhosis s/p liver transplant in 2002, EBV, Sjogren's syndrome, and recent basal cell carcinoma s/p Mohs surgery (4/8/25). She had a prior admission from 4/14/25-4/23/25 for ESBL UTI and E. faecalis bacteremia related to a sigmoid microperforation, which was treated with IV ertapenem. She follows with Dr. Lundberg as an outpatient.    She was re-admitted on 5/28/2025 with diverticulitis and a 4.1cm abscess. She underwent an open sigmoidectomy and end colostomy on 5/30/25. Her kayla-operative course has been complicated by polymicrobial bacteremia. Blood cultures from 5/28/25 grew Pseudomonas aeruginosa and Klebsiella pneumoniae; this seem to have been a transient bacteremia as it cleared by 5/31/25. Subsequent blood cultures on 5/31/25 grew Vancomycin-Resistant Enterococcus faecium (VRE), which cleared on 6/3/25.     Current antimicrobial therapy includes linezolid (started 6/2/25 for VRE) and Zerbexa (started 6/3/25 for CR-PA). She remains afebrile, and her WBC is slightly up today with normal eosinophils. She is on oxygen but reports no acute concerns. We will plan to continue with this therapy for at least 2 weeks for her bacteremia. TTE was negative for valvular pathology. Due to  concern for speech difficulty this morning, MRI brain is being pursued for stroke workup.                Enterococcus faecium VRE       MARYJANE     Ampicillin >=32 ug/mL Resistant     Daptomycin 3 ug/mL Susceptible Dose Dependent     Gentamicin Synergy Susceptible... Susceptible 1     Linezolid 2 ug/mL Susceptible     Vancomycin >=32 ug/mL Resistant                         Klebsiella pneumoniae Pseudomonas aeruginosa      MARYJANE MARYJANE     Ampicillin  Resistant 1       Ampicillin/ Sulbactam >=32 ug/mL Resistant       Cefepime <=0.12 ug/mL Susceptible 16 ug/mL Intermediate     Ceftazidime <=0.5 ug/mL Susceptible >=32 ug/mL Resistant     Ceftazidime Avibactam   <=2 ug/mL Susceptible     Ceftolozane/Tazobactam   <=2 ug/mL Susceptible     Ceftriaxone <=0.25 ug/mL Susceptible       Ciprofloxacin 0.5 ug/mL Intermediate 0.5 ug/mL Susceptible     Colistin   <=2 ug/mL Intermediate     Gentamicin <=1 ug/mL Susceptible       Levofloxacin 1 ug/mL Intermediate 2 ug/mL Intermediate     Meropenem <=0.25 ug/mL Susceptible 32 ug/mL Resistant     Piperacillin/Tazobactam 16 ug/mL Susceptible Dose Dependent >=128 ug/mL Resistant     Trimethoprim/Sulfamethoxazole <=1/19 ug/mL Susceptible          Transplant Checklist  - QTc interval: 379 on 6/3/2025  - Pneumocystis prophylaxis: none   - Bacterial prophylaxis: none   - Fungal prophylaxis: none   - Serostatus & viral prophylaxis: none   - Immunization status: Needs RSV, Prevenar-20, Tdap  - Gamma globulin status: IgG 1110 on 8/5/19              Immunizations:     Immunization History   Administered Date(s) Administered    COVID-19 12+ (Pfizer) 10/17/2024    COVID-19 Bivalent 12+ (Pfizer) 02/21/2021, 03/15/2021    COVID-19 MONOVALENT 12+ (Pfizer) 02/21/2021, 03/15/2021, 03/29/2021, 10/09/2021    COVID-19 Vaccine, Unspecified 02/08/2021, 03/15/2021, 03/29/2021    Flu 65+ (Fluad) 12/21/2019    Flu, Unspecified 11/07/2022    [id number] Novel Flu P-free 11/10/2009    HEPA 07/01/2001    HepB, Unspecified  07/01/2001    Influenza (High Dose) Trivalent,PF (Fluzone) 09/25/2015, 10/30/2016, 10/23/2017, 10/24/2024    Influenza (IIV3) PF 11/10/2004, 09/29/2007, 10/01/2010, 09/27/2012, 10/29/2012, 09/30/2013, 09/01/2016    Influenza Vaccine 18-64 (Flublok) 10/20/2018    Influenza Vaccine 65+ (Fluzone HD) 11/03/2020, 03/14/2022, 10/20/2022    Influenza Vaccine, 6+MO IM (QUADRIVALENT W/PRESERVATIVES) 10/01/2020    Pneumo Conj 13-V (2010&after) 02/26/2014, 01/01/2016    Pneumococcal 23 valent 12/01/2007, 01/01/2014, 05/25/2016    TD,PF 7+ (Tenivac) 01/01/2007    TDAP (Adacel,Boostrix) 01/01/2014    TDAP Vaccine (Adacel) 09/17/2007, 02/26/2014            Allergies:     Allergies   Allergen Reactions    Fluconazole Hives and Itching     Full body hives  **Intradermal skin testing negative**  [See intradermal skin testing results from 8/2/2019]    Mycophenolate Diarrhea and Nausea and Vomiting     Patient stated it was chronic and lasted months      Penicillins Anaphylaxis, Hives, Itching and Rash     **Intradermal skin testing negative**  [See intradermal skin testing results from 8/2/2019]      Simvastatin Muscle Pain (Myalgia)     severe  Other reaction(s): Myalgia caused by statin    Methotrexate Other (See Comments)     Other reaction(s): Sore  Sores in mouth, esophagus, and stomach.       Morphine And Codeine Itching and Other (See Comments)     Psych disturbance  Other reaction(s): Confusion, Mood alteration    Quinolones Anxiety, Dizziness, Headache, Other (See Comments), Palpitations and Unknown     Other reaction(s): Hyperactive behavior, Lightheadedness, Mood alteration    Dizzy, light headed    Dizziness, shaky, and jumpy    Capsules, Empty Gelatin [Gelatin]     Carvedilol Diarrhea     Per pt - severe diarrhea and LE swelling  + tolerating Toprol    Lansoprazole Diarrhea    Strawberry Extract     Azithromycin Itching     [See intradermal skin testing results from 8/2/2019]    Bactrim [Sulfamethoxazole-Trimethoprim]  Other (See Comments)     Numb mouth, tingling lips (treated with anti-histamines)    Cephalosporins Itching     [See intradermal skin testing results from 8/2/2019]    Ciprofloxacin Hcl Other (See Comments) and Dizziness     Insomnia, mood lability, Irregular heart beat         Doxycycline Itching and Unknown     [See intradermal skin testing results from 8/2/2019]    Lisinopril Cough    Omeprazole Itching    Tigecycline Other (See Comments)     Perioral numbness in 2025 with long-term use    Tolectin [Nsaids] Rash    Tolmetin Rash and Itching    Tramadol Rash, Hives and Itching             Medications:     Current Facility-Administered Medications:     acetaminophen (TYLENOL) tablet 975 mg, 975 mg, Oral, BID, Jah Bettencourt PA-C, 975 mg at 06/02/25 1011    albuterol (PROVENTIL) neb solution 2.5 mg, 2.5 mg, Nebulization, Q4H PRN, Jah Bettencourt PA-C    [Held by provider] amLODIPine (NORVASC) tablet 5 mg, 5 mg, Oral, Daily, Tess Rodriguez MD, 5 mg at 06/01/25 0741    apixaban ANTICOAGULANT (ELIQUIS) tablet 2.5 mg, 2.5 mg, Oral, BID, Negrito Chritsensen MD, 2.5 mg at 06/05/25 0918    calcitRIOL (ROCALTROL) capsule 0.25 mcg, 0.25 mcg, Oral, Daily, Jah Bettencourt PA-C, 0.25 mcg at 06/05/25 0918    calcium carbonate (TUMS) chewable tablet 1,000 mg, 1,000 mg, Oral, Q4H PRN, Jah Bettencourt PA-C, 500 mg at 06/03/25 0304    ceftolozane-tazobactam (ZERBAXA) 1.5 g vial to attach to  ml bag, 1.5 g, Intravenous, Q8H, Negrito Christensen MD, 1.5 g at 06/05/25 0522    glucose gel 15-30 g, 15-30 g, Oral, Q15 Min PRN **OR** dextrose 50 % injection 25-50 mL, 25-50 mL, Intravenous, Q15 Min PRN **OR** glucagon injection 1 mg, 1 mg, Subcutaneous, Q15 Min PRN, Tess Rodriguez MD    diclofenac (VOLTAREN) 1 % topical gel 4 g, 4 g, Topical, 4x Daily, Jah Bettencourt PA-C, 4 g at 06/04/25 1933    diphenhydrAMINE (BENADRYL) elixir 25 mg, 25 mg, Oral or Feeding Tube, Q6H PRN, Aleja Antunez MD, 25 mg at 06/04/25 8140    estradiol  (ESTRACE) cream 2 g, 2 g, Vaginal, Once per day on Monday Wednesday Friday, Jah Bettencourt PA-C, 2 g at 06/04/25 1933    ezetimibe (ZETIA) tablet 10 mg, 10 mg, Oral, At Bedtime, Jah Bettencourt PA-C, 10 mg at 06/04/25 2134    ferrous sulfate (FEROSUL) tablet 325 mg, 325 mg, Oral, At Bedtime, Jah Bettencourt PA-C    folic acid (FOLVITE) tablet 1,000 mcg, 1,000 mcg, Oral, At Bedtime, Jah Bettencourt PA-C, 1,000 mcg at 06/04/25 2134    gabapentin (NEURONTIN) solution 300 mg, 300 mg, Oral, At Bedtime, Jah Bettencourt PA-C, 300 mg at 06/04/25 2134    [Held by provider] hydrALAZINE (APRESOLINE) tablet 50 mg, 50 mg, Oral, TID, Jah Bettencourt PA-C    hydrocortisone sodium succinate PF (solu-CORTEF) injection 12.5 mg, 12.5 mg, Intravenous, Q6H, Negrito Christensen MD, 12.5 mg at 06/05/25 0520    insulin aspart (NovoLOG) injection (RAPID ACTING), 1-7 Units, Subcutaneous, TID Michael MON[place] Eileen, MD, 1 Units at 06/05/25 0919    insulin aspart (NovoLOG) injection (RAPID ACTING), 1-5 Units, Subcutaneous, At Bedtime, Tess Rodriguez MD, 1 Units at 06/04/25 2224    levothyroxine (SYNTHROID/LEVOTHROID) tablet 175 mcg, 175 mcg, Oral, QAM AC, Jah Bettencourt PA-C, 175 mcg at 06/05/25 0918    Lidocaine (LIDOCARE) 4 % Patch 2 patch, 2 patch, Transdermal, Q24H, Josefina Perea DO, 2 patch at 06/01/25 0740    lidocaine (LMX4) cream, , Topical, Q1H PRN, Jah Bettencourt PA-C    lidocaine 1 % 0.1-1 mL, 0.1-1 mL, Other, Q1H PRN, Jah Bettencourt, PAFinnC    linezolid (ZYVOX) tablet 600 mg, 600 mg, Oral, Q12H LUZMARIA (08/20), Maribel Landin MD, 600 mg at 06/05/25 0918    LORazepam (ATIVAN) tablet 0.5 mg, 0.5 mg, Oral, Once PRN, Vivian Russo MD    melatonin tablet 1 mg, 1 mg, Oral, At Bedtime, Josefina Perea DO    methocarbamol (ROBAXIN) tablet 500 mg, 500 mg, Oral, 4x Daily PRN, Arlene aTng MD    [Held by provider] metoprolol tartrate (LOPRESSOR) half-tab 12.5 mg, 12.5 mg, Oral, BID, Josefina Perea DO, 12.5 mg at 05/31/25  0757    multivitamin, therapeutic (THERA-VIT) tablet 1 tablet, 1 tablet, Oral, Daily, Datario, Jsoefina, DO, 1 tablet at 06/05/25 0918    naloxone (NARCAN) injection 0.2 mg, 0.2 mg, Intravenous, Q2 Min PRN **OR** naloxone (NARCAN) injection 0.4 mg, 0.4 mg, Intravenous, Q2 Min PRN **OR** naloxone (NARCAN) injection 0.2 mg, 0.2 mg, Intramuscular, Q2 Min PRN **OR** naloxone (NARCAN) injection 0.4 mg, 0.4 mg, Intramuscular, Q2 Min PRN, Aleja Antunez MD    Nutrisource Fiber PO packet 1 each, 1 packet, Oral, Daily, Jah Bettencourt PA-C, 1 each at 06/02/25 1723    nystatin (MYCOSTATIN) suspension 1,000,000 Units, 1,000,000 Units, Oral, 4x Daily, Jah Bettencourt PA-C, 1,000,000 Units at 06/04/25 1932    omega-3 acid ethyl esters (LOVAZA) capsule 1 g, 1 g, Oral, BID, Tess Rodriguez MD, 1 g at 06/05/25 0919    ondansetron (ZOFRAN) injection 4 mg, 4 mg, Intravenous, Q6H PRN, Jah Bettencourt PA-C, 4 mg at 06/03/25 1131    oxyCODONE (ROXICODONE) tablet 5 mg, 5 mg, Oral, Q3H PRN, 5 mg at 06/04/25 1034 **OR** [DISCONTINUED] oxyCODONE IR (ROXICODONE) tablet 10 mg, 10 mg, Oral, Q4H PRN, Datario, Josefina, DO, 10 mg at 05/30/25 1234    [Held by provider] predniSONE (DELTASONE) half-tab 9 mg, 9 mg, Oral, Daily, Luzma Osman, APRN CNP    sennosides (SENOKOT) tablet 2 tablet, 2 tablet, Oral or Feeding Tube, BID, Norah Jaquez, CNP, 2 tablet at 06/05/25 0918    sertraline (ZOLOFT) tablet 50 mg, 50 mg, Oral, Daily, Jah Bettencourt PA-C, 50 mg at 06/05/25 0918    sodium chloride (PF) 0.9% PF flush 3 mL, 3 mL, Intracatheter, Q8H LUZMARIA, Jah Bettencourt PA-C, 3 mL at 06/04/25 0542    sodium chloride (PF) 0.9% PF flush 3 mL, 3 mL, Intracatheter, q1 min prn, Jah Bettencourt PA-C    sodium phosphate 15 mmol in sodium chloride 0.9 % 250 mL intermittent infusion, 15 mmol, Intravenous, Once, Vivian Russo MD    tacrolimus (PROGRAF BRAND) suspension 1 mg, 1 mg, Oral, BID IS, Luzma Osman, APRN CNP, 1 mg at 06/05/25  "0918    ursodiol (ACTIGALL) tablet 250 mg, 250 mg, Oral, BID, Jah Bettencourt PA-C, 250 mg at 06/05/25 0918    vitamin A 3 MG (41293 UNITS) capsule 20,000 Units, 20,000 Units, Oral, Daily, Al Campbell MD, 20,000 Units at 06/05/25 0918   No current outpatient medications on file.      Physical Exam     Physical Exam     Vital signs:  BP (!) 155/76 (BP Location: Left arm)   Pulse 72   Temp 98.4  F (36.9  C) (Oral)   Resp 16   Ht 1.702 m (5' 7\")   Wt 82.4 kg (181 lb 10.5 oz)   LMP 06/01/1988 (Approximate)   SpO2 97%   BMI 28.45 kg/m      Awake, alert, no acute distress.  Lungs with mild crackles.  Heart irregular rate and rhythm, no murmurs.  Abdomen soft, non-distended, healing surgical incision without erythema or drainage.  Colostomy intact with appropriate output.  Extremities without edema.  No focal neurologic deficits noted on exam.    Data     Data   Laboratory data and imaging listed below was reviewed prior to this encounter.     CT Abdomen Pelvis w/o Contrast (05/29/2025 6:47 AM)     Lab Results   Component Value Date    WBC 11.4 (H) 06/05/2025    WBC 11.5 (H) 06/04/2025    WBC 11.5 (H) 06/04/2025            Jose Ramon Lee MD  [facility]  Contact information available via [facility] Paging/Directory     06/05/2025   "

## 2025-06-05 NOTE — PLAN OF CARE
Goal Outcome Evaluation:      Plan of Care Reviewed With: patient    Overall Patient Progress: no changeOverall Patient Progress: no change       Denies pain, nausea. Fair appetite.  WOC stopped by around 1200 to change colostomy pouch and provide edu to pt's daughter. Brown, liquid/soft BM.  , 155.  Phosphate replaced. Recheck tomorrow AM.  PIV SL. On IV zerbraxa q 8.  1 LPM NC. O2 intermittently dips down <88%.  Purewick in place. Clear yellow UOP.  Takes one pill at a time with water or applesauce.  Midline incision clean and dry. Staples in place. No drainage. TATO.

## 2025-06-06 ENCOUNTER — APPOINTMENT (OUTPATIENT)
Dept: MRI IMAGING | Facility: CLINIC | Age: 75
DRG: 329 | End: 2025-06-06
Attending: STUDENT IN AN ORGANIZED HEALTH CARE EDUCATION/TRAINING PROGRAM
Payer: MEDICARE

## 2025-06-06 ENCOUNTER — APPOINTMENT (OUTPATIENT)
Dept: CT IMAGING | Facility: CLINIC | Age: 75
End: 2025-06-06
Payer: MEDICARE

## 2025-06-06 LAB
ANION GAP SERPL CALCULATED.3IONS-SCNC: 11 MMOL/L (ref 7–15)
BILIRUB SERPL-MCNC: 0.4 MG/DL
BILIRUBIN DIRECT (ROCHE PRO & PURE): 0.23 MG/DL (ref 0–0.45)
BUN SERPL-MCNC: 39 MG/DL (ref 8–23)
CALCIUM SERPL-MCNC: 7.8 MG/DL (ref 8.8–10.4)
CHLORIDE SERPL-SCNC: 105 MMOL/L (ref 98–107)
CREAT SERPL-MCNC: 1.49 MG/DL (ref 0.51–0.95)
EGFRCR SERPLBLD CKD-EPI 2021: 36 ML/MIN/1.73M2
ERYTHROCYTE [DISTWIDTH] IN BLOOD BY AUTOMATED COUNT: 17.4 % (ref 10–15)
GLUCOSE BLDC GLUCOMTR-MCNC: 150 MG/DL (ref 70–99)
GLUCOSE BLDC GLUCOMTR-MCNC: 173 MG/DL (ref 70–99)
GLUCOSE BLDC GLUCOMTR-MCNC: 221 MG/DL (ref 70–99)
GLUCOSE BLDC GLUCOMTR-MCNC: 314 MG/DL (ref 70–99)
GLUCOSE BLDC GLUCOMTR-MCNC: 96 MG/DL (ref 70–99)
GLUCOSE SERPL-MCNC: 122 MG/DL (ref 70–99)
HAPTOGLOB SERPL-MCNC: 349 MG/DL (ref 30–200)
HCO3 SERPL-SCNC: 28 MMOL/L (ref 22–29)
HCT VFR BLD AUTO: 23.9 % (ref 35–47)
HGB BLD-MCNC: 7.4 G/DL (ref 11.7–15.7)
HGB BLD-MCNC: 7.5 G/DL (ref 11.7–15.7)
LDH SERPL L TO P-CCNC: 275 U/L (ref 0–250)
MAGNESIUM SERPL-MCNC: 1.7 MG/DL (ref 1.7–2.3)
MCH RBC QN AUTO: 27.5 PG (ref 26.5–33)
MCHC RBC AUTO-ENTMCNC: 31 G/DL (ref 31.5–36.5)
MCV RBC AUTO: 89 FL (ref 78–100)
MCV RBC AUTO: 89 FL (ref 78–100)
PHOSPHATE SERPL-MCNC: 3 MG/DL (ref 2.5–4.5)
PLATELET # BLD AUTO: 305 10E3/UL (ref 150–450)
POTASSIUM SERPL-SCNC: 3 MMOL/L (ref 3.4–5.3)
POTASSIUM SERPL-SCNC: 4.2 MMOL/L (ref 3.4–5.3)
RBC # BLD AUTO: 2.69 10E6/UL (ref 3.8–5.2)
SODIUM SERPL-SCNC: 144 MMOL/L (ref 135–145)
TACROLIMUS BLD-MCNC: 1.2 UG/L (ref 5–15)
TME LAST DOSE: ABNORMAL H
TME LAST DOSE: ABNORMAL H
WBC # BLD AUTO: 14.6 10E3/UL (ref 4–11)

## 2025-06-06 PROCEDURE — 258N000003 HC RX IP 258 OP 636

## 2025-06-06 PROCEDURE — 70551 MRI BRAIN STEM W/O DYE: CPT

## 2025-06-06 PROCEDURE — 85027 COMPLETE CBC AUTOMATED: CPT | Performed by: PHYSICIAN ASSISTANT

## 2025-06-06 PROCEDURE — 250N000013 HC RX MED GY IP 250 OP 250 PS 637

## 2025-06-06 PROCEDURE — 80197 ASSAY OF TACROLIMUS: CPT | Performed by: NURSE PRACTITIONER

## 2025-06-06 PROCEDURE — 85018 HEMOGLOBIN: CPT

## 2025-06-06 PROCEDURE — 250N000011 HC RX IP 250 OP 636: Mod: JZ

## 2025-06-06 PROCEDURE — 999N000198 HC STATISTIC WOC PT EDUCATION, 16-30 MIN

## 2025-06-06 PROCEDURE — 36415 COLL VENOUS BLD VENIPUNCTURE: CPT

## 2025-06-06 PROCEDURE — 70551 MRI BRAIN STEM W/O DYE: CPT | Mod: 26 | Performed by: RADIOLOGY

## 2025-06-06 PROCEDURE — 250N000013 HC RX MED GY IP 250 OP 250 PS 637: Performed by: STUDENT IN AN ORGANIZED HEALTH CARE EDUCATION/TRAINING PROGRAM

## 2025-06-06 PROCEDURE — 83735 ASSAY OF MAGNESIUM: CPT | Performed by: STUDENT IN AN ORGANIZED HEALTH CARE EDUCATION/TRAINING PROGRAM

## 2025-06-06 PROCEDURE — 36415 COLL VENOUS BLD VENIPUNCTURE: CPT | Performed by: NURSE PRACTITIONER

## 2025-06-06 PROCEDURE — 250N000013 HC RX MED GY IP 250 OP 250 PS 637: Performed by: NURSE PRACTITIONER

## 2025-06-06 PROCEDURE — 250N000011 HC RX IP 250 OP 636

## 2025-06-06 PROCEDURE — 99232 SBSQ HOSP IP/OBS MODERATE 35: CPT | Mod: GC | Performed by: STUDENT IN AN ORGANIZED HEALTH CARE EDUCATION/TRAINING PROGRAM

## 2025-06-06 PROCEDURE — 250N000013 HC RX MED GY IP 250 OP 250 PS 637: Performed by: PHYSICIAN ASSISTANT

## 2025-06-06 PROCEDURE — 99233 SBSQ HOSP IP/OBS HIGH 50: CPT | Mod: 24 | Performed by: INTERNAL MEDICINE

## 2025-06-06 PROCEDURE — 99207 PR SC NO CHARGE VISIT/PATIENT NOT SEEN: CPT | Performed by: NURSE PRACTITIONER

## 2025-06-06 PROCEDURE — 74177 CT ABD & PELVIS W/CONTRAST: CPT | Mod: 26 | Performed by: RADIOLOGY

## 2025-06-06 PROCEDURE — 84132 ASSAY OF SERUM POTASSIUM: CPT | Performed by: STUDENT IN AN ORGANIZED HEALTH CARE EDUCATION/TRAINING PROGRAM

## 2025-06-06 PROCEDURE — 80048 BASIC METABOLIC PNL TOTAL CA: CPT

## 2025-06-06 PROCEDURE — 83615 LACTATE (LD) (LDH) ENZYME: CPT

## 2025-06-06 PROCEDURE — 82248 BILIRUBIN DIRECT: CPT

## 2025-06-06 PROCEDURE — 74177 CT ABD & PELVIS W/CONTRAST: CPT

## 2025-06-06 PROCEDURE — 250N000012 HC RX MED GY IP 250 OP 636 PS 637: Performed by: NURSE PRACTITIONER

## 2025-06-06 PROCEDURE — 250N000011 HC RX IP 250 OP 636: Performed by: STUDENT IN AN ORGANIZED HEALTH CARE EDUCATION/TRAINING PROGRAM

## 2025-06-06 PROCEDURE — 84100 ASSAY OF PHOSPHORUS: CPT | Performed by: STUDENT IN AN ORGANIZED HEALTH CARE EDUCATION/TRAINING PROGRAM

## 2025-06-06 PROCEDURE — 36415 COLL VENOUS BLD VENIPUNCTURE: CPT | Performed by: STUDENT IN AN ORGANIZED HEALTH CARE EDUCATION/TRAINING PROGRAM

## 2025-06-06 PROCEDURE — 83010 ASSAY OF HAPTOGLOBIN QUANT: CPT

## 2025-06-06 PROCEDURE — 120N000002 HC R&B MED SURG/OB UMMC

## 2025-06-06 PROCEDURE — 250N000013 HC RX MED GY IP 250 OP 250 PS 637: Performed by: SURGERY

## 2025-06-06 RX ORDER — POTASSIUM CHLORIDE 20MEQ/15ML
20 LIQUID (ML) ORAL ONCE
Status: COMPLETED | OUTPATIENT
Start: 2025-06-06 | End: 2025-06-06

## 2025-06-06 RX ORDER — HYDROCORTISONE SODIUM SUCCINATE 100 MG/2ML
12.5 INJECTION INTRAMUSCULAR; INTRAVENOUS EVERY 6 HOURS
Status: DISCONTINUED | OUTPATIENT
Start: 2025-06-06 | End: 2025-06-08

## 2025-06-06 RX ORDER — IOPAMIDOL 755 MG/ML
108 INJECTION, SOLUTION INTRAVASCULAR ONCE
Status: COMPLETED | OUTPATIENT
Start: 2025-06-06 | End: 2025-06-06

## 2025-06-06 RX ORDER — POTASSIUM CHLORIDE 750 MG/1
40 TABLET, EXTENDED RELEASE ORAL ONCE
Status: COMPLETED | OUTPATIENT
Start: 2025-06-06 | End: 2025-06-06

## 2025-06-06 RX ORDER — MAGNESIUM SULFATE HEPTAHYDRATE 40 MG/ML
2 INJECTION, SOLUTION INTRAVENOUS ONCE
Status: COMPLETED | OUTPATIENT
Start: 2025-06-06 | End: 2025-06-06

## 2025-06-06 RX ORDER — POTASSIUM CHLORIDE 20MEQ/15ML
40 LIQUID (ML) ORAL ONCE
Status: COMPLETED | OUTPATIENT
Start: 2025-06-06 | End: 2025-06-06

## 2025-06-06 RX ORDER — POTASSIUM CHLORIDE 750 MG/1
20 TABLET, EXTENDED RELEASE ORAL ONCE
Status: COMPLETED | OUTPATIENT
Start: 2025-06-06 | End: 2025-06-06

## 2025-06-06 RX ORDER — IOPAMIDOL 755 MG/ML
108 INJECTION, SOLUTION INTRAVASCULAR ONCE
Status: DISCONTINUED | OUTPATIENT
Start: 2025-06-06 | End: 2025-06-06

## 2025-06-06 RX ADMIN — HYDROCORTISONE SODIUM SUCCINATE 12.5 MG: 100 INJECTION, POWDER, FOR SOLUTION INTRAMUSCULAR; INTRAVENOUS at 16:44

## 2025-06-06 RX ADMIN — Medication 1 MG: at 10:09

## 2025-06-06 RX ADMIN — LINEZOLID 600 MG: 600 TABLET, FILM COATED ORAL at 10:06

## 2025-06-06 RX ADMIN — ESTRADIOL 2 G: 0.1 CREAM VAGINAL at 20:29

## 2025-06-06 RX ADMIN — NYSTATIN 1000000 UNITS: 100000 SUSPENSION ORAL at 16:41

## 2025-06-06 RX ADMIN — SENNOSIDES 2 TABLET: 8.6 TABLET, FILM COATED ORAL at 10:09

## 2025-06-06 RX ADMIN — URSODIOL 250 MG: 250 TABLET ORAL at 10:08

## 2025-06-06 RX ADMIN — NYSTATIN 1000000 UNITS: 100000 SUSPENSION ORAL at 11:16

## 2025-06-06 RX ADMIN — LEVOTHYROXINE SODIUM 175 MCG: 0.17 TABLET ORAL at 08:19

## 2025-06-06 RX ADMIN — EZETIMIBE 10 MG: 10 TABLET ORAL at 21:21

## 2025-06-06 RX ADMIN — APIXABAN 2.5 MG: 2.5 TABLET, FILM COATED ORAL at 20:21

## 2025-06-06 RX ADMIN — APIXABAN 2.5 MG: 2.5 TABLET, FILM COATED ORAL at 10:06

## 2025-06-06 RX ADMIN — MAGNESIUM SULFATE HEPTAHYDRATE 2 G: 2 INJECTION, SOLUTION INTRAVENOUS at 10:15

## 2025-06-06 RX ADMIN — CEFTOLOZANE AND TAZOBACTAM 1.5 G: 1; .5 INJECTION, POWDER, LYOPHILIZED, FOR SOLUTION INTRAVENOUS at 06:07

## 2025-06-06 RX ADMIN — SENNOSIDES 2 TABLET: 8.6 TABLET, FILM COATED ORAL at 20:21

## 2025-06-06 RX ADMIN — HYDROCORTISONE SODIUM SUCCINATE 12.5 MG: 100 INJECTION, POWDER, FOR SOLUTION INTRAMUSCULAR; INTRAVENOUS at 21:21

## 2025-06-06 RX ADMIN — CALCITRIOL CAPSULES 0.25 MCG 0.25 MCG: 0.25 CAPSULE ORAL at 10:08

## 2025-06-06 RX ADMIN — URSODIOL 250 MG: 250 TABLET ORAL at 20:21

## 2025-06-06 RX ADMIN — OMEGA-3-ACID ETHYL ESTERS 1 G: 1 CAPSULE, LIQUID FILLED ORAL at 10:08

## 2025-06-06 RX ADMIN — POTASSIUM CHLORIDE 20 MEQ: 1.5 SOLUTION ORAL at 16:41

## 2025-06-06 RX ADMIN — LORAZEPAM 0.5 MG: 0.5 TABLET ORAL at 17:03

## 2025-06-06 RX ADMIN — OMEGA-3-ACID ETHYL ESTERS 1 G: 1 CAPSULE, LIQUID FILLED ORAL at 20:29

## 2025-06-06 RX ADMIN — FERROUS SULFATE TAB 325 MG (65 MG ELEMENTAL FE) 325 MG: 325 (65 FE) TAB at 21:21

## 2025-06-06 RX ADMIN — CEFTOLOZANE AND TAZOBACTAM 1.5 G: 1; .5 INJECTION, POWDER, LYOPHILIZED, FOR SOLUTION INTRAVENOUS at 13:55

## 2025-06-06 RX ADMIN — Medication 1000 MCG: at 21:21

## 2025-06-06 RX ADMIN — LINEZOLID 600 MG: 600 TABLET, FILM COATED ORAL at 20:21

## 2025-06-06 RX ADMIN — NYSTATIN 1000000 UNITS: 100000 SUSPENSION ORAL at 20:20

## 2025-06-06 RX ADMIN — CEFTOLOZANE AND TAZOBACTAM 1.5 G: 1; .5 INJECTION, POWDER, LYOPHILIZED, FOR SOLUTION INTRAVENOUS at 20:30

## 2025-06-06 RX ADMIN — THERA TABS 1 TABLET: TAB at 10:08

## 2025-06-06 RX ADMIN — HYDROCORTISONE SODIUM SUCCINATE 12.5 MG: 100 INJECTION, POWDER, FOR SOLUTION INTRAMUSCULAR; INTRAVENOUS at 11:16

## 2025-06-06 RX ADMIN — SERTRALINE HYDROCHLORIDE 50 MG: 50 TABLET ORAL at 09:59

## 2025-06-06 RX ADMIN — Medication 20000 UNITS: at 10:09

## 2025-06-06 RX ADMIN — Medication 2 MG: at 20:29

## 2025-06-06 RX ADMIN — SODIUM CHLORIDE 1000 ML: 9 INJECTION, SOLUTION INTRAVENOUS at 11:20

## 2025-06-06 RX ADMIN — IOPAMIDOL 108 ML: 755 INJECTION, SOLUTION INTRAVENOUS at 13:25

## 2025-06-06 RX ADMIN — POTASSIUM CHLORIDE 40 MEQ: 20 SOLUTION ORAL at 13:58

## 2025-06-06 RX ADMIN — GABAPENTIN 300 MG: 250 SOLUTION ORAL at 21:21

## 2025-06-06 ASSESSMENT — ACTIVITIES OF DAILY LIVING (ADL)
ADLS_ACUITY_SCORE: 68
ADLS_ACUITY_SCORE: 64
ADLS_ACUITY_SCORE: 68
ADLS_ACUITY_SCORE: 64
ADLS_ACUITY_SCORE: 68
ADLS_ACUITY_SCORE: 64
ADLS_ACUITY_SCORE: 68
ADLS_ACUITY_SCORE: 64
ADLS_ACUITY_SCORE: 68
ADLS_ACUITY_SCORE: 68
ADLS_ACUITY_SCORE: 64
ADLS_ACUITY_SCORE: 64
ADLS_ACUITY_SCORE: 68
ADLS_ACUITY_SCORE: 64
ADLS_ACUITY_SCORE: 68
ADLS_ACUITY_SCORE: 64
ADLS_ACUITY_SCORE: 64
ADLS_ACUITY_SCORE: 68

## 2025-06-06 NOTE — PROGRESS NOTES
[facility] Nurse Inpatient Assessment     Consulted for: new end colostomy    Summary:   5/30 - Pt's daughter Gloria at bedside for pouch change demo, would like to be present for education  6/3 - daughter not here - stool present in pouch  6/4: daughter not at bedside- unable to make it to hospital today. Pouch intact. Pt too tired for education today. Pt due for pouch change Thursday.   6/5: met with daughter and pt at bedside, pt able to use coloplast pouch closure much better than the manny one, pt able to remove pouch with assist. Daughter able to complete the rest of the pouch change with prompting. Placed convex coloplast pouch today but not sure if this is the correct fit, ordered some flat coloplast pouches with convex oval ring to try for tomorrow, plan to meet with daughter at 2pm tomorrow for continued teaching.   6/6: pouch intact, new supplies not available in room, ordered flat coloplast pouch and convex oval rings again. Pt and daughter prefer coloplast.    [facility] nurse follow-up plan: daily     Patient History (according to provider note(s):      75 year old female with history of liver transplant admitted for smoldering diverticulitis with 4.1cm abscess s/p open sigmoidectomy and end colostomy on 5/29/25,     Assessment:      Areas visualized during today's visit: Focused: Abdomen      6/2 LLQ    Assessment of new end Colostomy:  Diagnosis Pertinent to Stoma: Diverticulitis with perforation      Surgery Date: 5/29/25  Surgeon: Al Campbell MD       Hospital: [facility]  Pouching system in place on assessment today: Coloplast one piece convex, barrier ring, and skin prep   Pouch last changed/wear time: 1 day  Reason for pouch change today: pouch not changed today  Effectiveness of current pouching/ supply plan: Effective  Change made with ostomy management today: No  Pouching system placed today: Coloplast one piece convex, barrier ring, and skin prep    Supplies: re-ordered one piece flat pouches and medium convex rings and discussed with patient   Last photo: 6/2/25  Stoma location: LLQ  Stoma size: 38 mm, oval  Stoma appearance: dusky at superior aspect, edematous, retracting (not assessed 6/6)  Mucocutaneous junction: separation at 11-1 o clock and 6 - 9 o clock, about 1cm in depth (not assessed 6/6)  Peristomal complication(s): within a crease    Output: thick / pasty brown stool  Output volume emptied during visit: not emptied 6/6  Abdominal assessment: Soft  Surgical site(s): dressing dry and intact  NG still in place? No  Pain: denies  Is patient still on a PCA? No    Ostomy education assessment:  Participant of teaching session today: patient  and family member Gloria  Education completed today: Pouching system assessment , Adjustment of pouching plan, Pouch emptying return demonstration, and Lifestyle adjustments   Educational materials/methods: Verbal and Addressed patient/caregiver questions/concerns  Education still needed: Pouch change return demonstration and Discharge instructions-- additional pouch change practice   Learning Comprehension:   Psychosocial assessment: Pt says she wants to be able to change pouch after discharge - wants to return to independent living apartment with some assistance (was in TCU prior to this admission). Daughter can assist after discharge if needed, wants to be present for teaching  Patient readiness for education today: attentive and active participation,   Following today's visit: patient  and family member daughter is able to demonstrate;         1. How to empty their pouch? Yes, with moderate assist, and Needs additional practice         2. How to change their pouch? Yes, with moderate assist, and Needs additional practice          Preparation for discharge completed: No discharge preparation started yet-- still adjusting pouching plan   Preparation for discharge still needed: Placed prescription recommendations in  "discharge navigator for MD to sign, Ensured patient has extra supplies for discharge, Discussed how to order supplies after discharge , Ordered samples from  after gaining consent from patient/caregiver, Discussed how and when to make an outpatient [facility] nurse appointment after discharge, Prepared for discharge home with home care, and Discuss signs/symptoms of when to seek medical attention  Pt support system on discharge: Daughter Gloria  [facility] recommend home care? By [facility] RN if possible  Face to face time: 20 minutes    Treatment Plan:     LLQ Colostomy pouching plan:   Pouching system: ostomy supplies pouches: one piece Coloplast fecal (sample from [facility])   Accessories used: [facility] ostomy accessories: 2\" Cera Barrier Ring ([id number]) and Cavilon no sting barrier film ([id number])   Frequency of pouch changes: Twice weekly and PRN leakage  [facility] follow up plan: Daily Monday-Friday (as able)  Bedside RN interventions: Change pouch PRN if leaking using the supplies above, Empty pouch when 1/3 to 1/2 full, ensure to clean pouch outlet after emptying to prevent odor, Notify [facility] for ongoing pouch leakage, Document stoma appearance and output volume, color, and consistency every shift, Encourage patient to empty pouch with assist, and Assist patient to measure and record output     Orders: Updated      DATA:     Current support surface: Standard    Containment of urine/stool: Suction based external urinary catheter  and Colostomy pouch  BMI: Body mass index is 28.28 kg/m .   Active diet order: Orders Placed This Encounter      Clear Liquid Diet     Output: I/O last 3 completed shifts:  In: 1756.64 [P.O.:270; I.V.:1436.64; IV Piggyback:50]  Out: 1680 [Urine:1250; Drains:430]     Labs:   Recent Labs   Lab 05/30/25  0412 05/29/25  1515 05/29/25  1051 05/29/25  0546   ALBUMIN  --  2.4*  --   --    HGB 8.6* 8.5*   < > 7.6*   INR  --   --   --  1.49*   WBC 20.2* 20.2*  --  9.1   A1C  --  6.3*  --   --     < > = values in this " interval not displayed.     Pressure injury risk assessment:   Sensory Perception: 2-->very limited  Moisture: 3-->occasionally moist  Activity: 1-->bedfast  Mobility: 2-->very limited  Nutrition: 2-->probably inadequate  Friction and Shear: 2-->potential problem  Shashank Score: 12      Pager no longer is use, please contact through [place] group: [facility] Nurse [place]   Dept. Office Number: [phone]    Katelyn Parikh RN CWOCN

## 2025-06-06 NOTE — PROGRESS NOTES
[facility]    Progress Note - Medicine Service, JOVANNY TEAM 3       Date of Admission:  5/28/2025    Assessment & Plan     Luz Thompson is a 75 year old female who was admitted on 5/28/2025 with past medical history of atrial fibrillation, DVT on eliquis, CVA, CKD III, HFpEF (TTE 3/9/25 EF 60-65%), T2DM, biliary cirrhosis s/p liver transplant in 2002, EBV, HTN, HLD, Sjogren's syndrome, Basal Cell Carincoma s/p MOHS on 4/8/25, thyroid cancer s/p thyroidectomy in 2010, Sjogren's syndrome, and diverticulitis with recent admission from 4/14-4/23 for ESBL UTI & E. Faecalis bacteremia related to sigmoid microperforation (treated with IV ertapenem) who is admitted for abdominal pain and hypotension, found to have smoldering diverticulitis with 4.1cm abscess s/p open sigmoidectomy and end colostomy on 5/30/25, with Psuedomonas positive blood cultures drawn 5/28.  Ongoing IV antibiotics due to bacteremia (GPC and VRE) and weaning off stress dose steroids.     Today:  - Hypotension, had to resume IV Hydrocortisone  -CT A/P pending, concern for abdominal infection with new WC and PATY drain with cloudy output     # Diverticulitis c/b abscess s/p open sigmoidectomy with end colostomy 5/30/25  # Sepsis 2/2 Klebsiella pneumoniae and Pseudomonas aeruginosa bacteremia 5/28, likely of intra-abdominal origin   # Hypotension 2/2 sepsis  Patient now hypertensive, after coming off pressors on 5/30. Currently with ongoing IV steroid taper, will plan to stop continuous maintenance fluids. Will hold PTA antihypertensives while titrating and resume when able.  Transitioned from meropenem to ceftolozane-tazobactam due to resistance (psuedomonas coverage) and switch vancomycin to linezolid iso DERREK for coverage of previous VRE E Facecium.  From ID perspective, there is no good oral antibiotic options and patient will complete 7-day IV antibiotic course at the hospital (first day of blood  culture clearance 6/3). New cloudy PATY drain output on 6/6.  -CT A/P pending  - CRS consulted              - ADAT  - subcutaneous heparin for DVT ppx, continue to hold eliquis for now  - Hx of liver transplant - immunosuppression per transplant immunology team   - High dose Vitamin A x30 days post-op for wound healing support in setting of immunosuppression  - senokot BID  - Continue WOC ostomy cares   - Continue PATY drain   - Encourage out of bed and ambulation, incentive spirometer use   - Transplant ID following  - meropenem (5/31- 6/3) for pseudomonas coverage  - linezolid (6/1 -6/3)  for enterococcal bacteremia  - Ceftolozane-Tazobactam (6/3-6/9), for a 7-day course (Bcx clear from 6/3)   - Pain regimen:              - Scheduled: Robaxin QID, tylenol 975mg BID, gabapentin 300mg QHS, Lidocaine patches daily, Diclofenac QID  -  PRN: PO oxycodone 5mg  - Blood cultures drawn 5/28 positive for Klebsiella and Pseudomonas  - Bcx (5/31): E Faecium VRE  - Bcx (6/2): GPC in pairs and chains   - Bcx (6/3): NGTD   - Resume Hydrocortisone 12.5mg [id number]  - If decreasing back to IS dosing, would pause on pred 10 until tac level therapeutic (goal 3-5)   - hold PTA amlodipine while tapering fluids and IV steroids     #C/f TIA   In the morning of 6/4, patient states that she has had speech problems in the last couple of days since the surgery.  She describes that she has trouble getting her words out and that her speech does not feel as fluent as it was previously.  She denies any new numbness or tingling.  No headache or changes in vision.  Nonfocal on exam.  CT head negative for intracranial bleed or other acute process.  TTE with bubble also negative.  Certainly possible that patient may have had a TIA following surgery as anticoagulation had been held.  Will proceed with MRI brain for further workup.  - Continue PTA Xarelto  - Follow-up Brain MRI with Ativan premedication (6/5)     # DERREK on CKD3, resolved   # Bilateral stent  placement per urology 5/29  # ATN  DERREK in the setting of hypotension, shock, nephrotoxic meds which is now resolved after IVF and improved PO after surgery.   - Baseline creatinine 1.3-1.6  - external catheter   - PTA estradiol vaginal  cream     # Primary biliary cirrhosis s/p liver transplant 2002  # EBV +  # Reactive Leukocytosis   # Sjogren's syndrome  - Transplant Hepatology consulted  - No sirolimus  - Resumed PTA Prednisone 9 mg PO   - Tac 1 mg po BID per transplant hep              - ordered suspension formulation instead of capsule due to gelatin allergy  - CMP in AM  - PTA Nystatin QID  - PTA Ursodiol      # Acute post-op pain  # high risk delirium   - delirium precaution   - Monitor neurological status. Delirium preventions and precautions.   - pain regimen as above  - PTA sertraline  - melatonin Q HS  - PRN narcan     # Protein calorie deficit malnutrition    - Nutrition consulted  - Supplements: Gelatin Plus TID with meals + PRN per patient request  - Thera-Vit 1 tablet daily   - Vitamin A level (ordered)     # Post operative ventilatory support, resolved  # Acute hypoxic respiratory support   Currently on 1L NC.  - Supplemental oxygen to keep saturation above 92 %.  - PRN albuterol      # Paroxysmal A-fib on apixaban   # Hypertension  # HFpEF (TTE 3/9/25 EF 60-65%)   # history of CVA  # Vasoplagia  - Monitor hemodynamic status.  - Levophed off since 0400 5/31  - MAP goal >65  - PTA ezetimibe  - holding PTA amlodipine 5mg BID  - Continue PTA apixaban 2.5mg BID  - Holding metoprolol tartrate 12.5 mg BID  - holding hydralazine, when reordering would prefer 2 25 mg tabs   - SBP >100 or MAP >65  - weaning steroids as above      # Type 2 diabetes  # Stress hyperglycemia    - MDSSI  - Goal to keep BG< 180 for optimal wound healing   - Glucose AC/HS  - hypoglycemic protocol  - On prednisone, transitioned from hydrocortisone      # Thyroid cancer status post thyroidectomy 2010  - PTA calcitriol 0.25mcg  - PTA  "levothyroxine 175mcg     # Acute blood loss anemia    # Anemia of critical illness  # coagulopathy due to cirrhosis and surgical blood loss    # thrombocytopenia   # anemia of critical illness   - Transfuse if hgb <7.0 or signs/symptoms of hypoperfusion. Monitor and trend.   - PTA Ferous sulfate, folic acid     # Weakness and deconditioning of critical illness   - Physical and occupational therapy consult   - up with assist, fall precaution      # Basal cell carcinoma status post Mohs April 2025           Diet: Snacks/Supplements Adult: Other; PRN per pt request; With Meals  Snacks/Supplements Adult: Other; Offer pt Gelatein Plus or 20 TID with meals - please ask what flavor she prefers with each meal; With Meals  Room Service  Advance Diet as Tolerated: Regular Diet Adult; Regular Diet Adult  Snacks/Supplements Adult: Glucerna; Between Meals    DVT Prophylaxis: Eliquis  Ron Catheter: Not present  Fluids: Bolus  Lines: None     Cardiac Monitoring: ACTIVE order. Indication: Bradycardias (48 hours)  Code Status: Full Code      Clinically Significant Risk Factors        # Hypokalemia: Lowest K = 3 mmol/L in last 2 days, will replace as needed        # Hypoalbuminemia: Lowest albumin = 2.4 g/dL at 5/29/2025  3:15 PM, will monitor as appropriate     # Hypertension: Noted on problem list            # Overweight: Estimated body mass index is 27.73 kg/m  as calculated from the following:    Height as of this encounter: 1.702 m (5' 7\").    Weight as of this encounter: 80.3 kg (177 lb 0.5 oz).      # Financial/Environmental Concerns: none         Disposition Plan     Medically Ready for Discharge: Anticipated in 2-4 Days         The patient's care was discussed with the Attending Physician, Dr. Russo.    Lisa Ferreira MD  Medicine Service, [facility] TEAM [address] [place] [address]  Securely message with Patient Conversation Media (more info)  Text page via [facility] Paging/Directory   See signed in provider for up " to date coverage information  ______________________________________________________________________    Interval History   No compaints from overnight, had gradually decreasing blood pressure but denies any dizziness, lightheadedness. Feels 'tired' but was awoken for exam this morning.     Physical Exam   Vital Signs: Temp: 98.1  F (36.7  C) Temp src: Oral BP: 91/47 Pulse: 80   Resp: 18 SpO2: 100 % O2 Device: Nasal cannula Oxygen Delivery: 1 LPM  Weight: 177 lbs .47 oz  Gen: Comfortable appearing older woman lying in bed, in no distress  HEENT: Atraumatic, nonicteric sclera  CV: Normal rate and rhythym, limbs well perfused. No accessory heart sounds.   Pulm: Clear to auscultation in all fields, no wheezing or crackles  Abd: Nontender, nondistended abdomen. PATY drain in place, serous output.   Skin: No lesions on exposed skin  Neuro: Appears alert and is appropriately conversational, moving all limbs spontaneously  Psych: Appropriate to the situation      Medical Decision Making       Please see A&P for additional details of medical decision making.      Data   K 3.0, on protocal  Cr 1.49, increased from 1.22  WC increased to 14 from 11  Hbg decreased to 7.4 from 9.2

## 2025-06-06 NOTE — PROGRESS NOTES
Care Management Follow Up    Length of Stay (days): 9    Expected Discharge Date: 06/09/2025     Concerns to be Addressed: discharge planning     Patient plan of care discussed at interdisciplinary rounds: Yes    Anticipated Discharge Disposition: Transitional Care    Anticipated Discharge Services: Transportation Services  Anticipated Discharge DME: None    Patient/family educated on Medicare website which has current facility and service quality ratings: yes  Education Provided on the Discharge Plan:  yes  Patient/Family in Agreement with the Plan: yes    Referrals Placed by CM/SW:  TCU  Private pay costs discussed: Not applicable    Discussed  Partnership in Safe Discharge Planning  document with patient/family: No     Handoff Completed: Yes, [facility] PCP: Internal handoff referral completed    Additional Information:    Per chart review, pt will be discharge with IV ABX, cost of IV ABX at $246.27 per day due to the pt's insurance doesn't cover IV ABX.     Met with the pt, Virginia and daughter Gloria at bedside. Sharing the cost of IV ABX OOP pay and therapy recs of TCU. Virginia endorsed that she realized that she needs rehab to get stronger before going back to the facility. Another factor she needs TCU to cover the cost of her IV ABX since she can't afford pay it OOP and unable to administer herself at home. Both Virginia and Gloria shared that they had good experiences at [facility] last time and hope to go back there. They are aware of if that TCU can't accept or no bed availability, they are okay to got to [facility] with high rating TCUs. This writer informed them that will send TCU referrals according to their wishes and will update upon available.    Discussed private pay of Wheel Chair transportation to the TCU. Virginia and Gloria agreed private pay WC base:  $99.20; mileage:  $6.38 per mile.     Next Steps: CM will monitor for medical readiness and sending TCU referrals    Yola Caro RN    7C RN  Coordinator   [facility] Acute Care Management  Phone: [phone]  Available on Vocera:  Med Surg [id number] thru [id number] RNCC

## 2025-06-06 NOTE — PROGRESS NOTES
Brief Hepatology note:    Transplant: Liver- LDLT  Date of transplant: 5/22/02  Reason for tx: PBC  Post op course c/b: diverticulitis s/p open sigmoidectomy, end colostomy (5/29/25), E. Facium BC (6/2)    Current immunosuppression:  tacrolimus 1 mg BID, solucortef 12.5 mg q 6 h.     Lab Results   Component Value Date    AST 21 06/04/2025    AST 55 09/18/2020     Lab Results   Component Value Date    ALT 20 06/04/2025    ALT 78 09/18/2020     Lab Results   Component Value Date    BILICONJ 0.0 07/24/2014      Lab Results   Component Value Date    BILITOTAL 0.4 06/06/2025    BILITOTAL 0.3 09/18/2020     Lab Results   Component Value Date    ALBUMIN 2.7 06/04/2025    ALBUMIN 3.1 06/24/2022    ALBUMIN 3.5 09/18/2020     Lab Results   Component Value Date    PROTTOTAL 5.5 06/04/2025    PROTTOTAL 6.4 09/18/2020      Lab Results   Component Value Date    ALKPHOS 137 06/04/2025    ALKPHOS 152 09/18/2020       Creatinine   Date Value Ref Range Status   06/06/2025 1.49 (H) 0.51 - 0.95 mg/dL Final   09/18/2020 1.3 mg/dL Final   ]    WBC   Date Value Ref Range Status   09/18/2020 7.1 10^9/L Final     WBC Count   Date Value Ref Range Status   06/06/2025 14.6 (H) 4.0 - 11.0 10e3/uL Final   ]    Immunosuppressant Medications       Immunosuppressive Agents Disp Start End     tacrolimus (PROGRAF BRAND) suspension 2 mg -- 6/6/2025 --     2 mg, Oral, 2 TIMES DAILY., Shake well. Check if DRUG LEVEL is needed BEFORE administering dose. Not recommended to administer via jejunal route, but jejunal route may be used if that is only route available.  As this medication is not commercially available as a liquid,  [employer] manufactures this product using tacrolimus (PROGRAF BRAND) capsules.    Class: E-Prescribe              Tacrolimus level: 1.2  Goal tac level:  ~3  Creatinine: 1.49    Plan:   #  LDLT 5/22/02 for PBC  # Immunosuppression  # Open sigmoidectomy, colostomy  - liver tests have been wnl. Now with + BC with e. Facium. On abx.   -  Agree with repeat CT abdomen. Mentation not at baseline.   - Tacrolimus level 1.2 after recent increase in dose to 2 mg BID. Will increase further. Suspension absorption variable.   - Currently on hydrocortisone 12.5 mg Q6 hours. Now on taper. Return to prednisone at 9 mg daily when drop further.     LIZ Martínez, CNP  Inpatient hepatology [facility]

## 2025-06-06 NOTE — PLAN OF CARE
Shift Hours: 1500 - 2300    Assessment:  Body systems that were not at patient's baseline Respiratory, Gastrointestinal, Genitourinary, and Musculoskeletal. Focused body system assessments documented in flowsheets.        Activity     Fall Risk Score: 60   Bed alarm on? Yes     Activity Assistance Provided: assistance, 2 people      Assistive Device Utilized: lift device    Pain: 5/10 pain managed with PRN oxycodone and scheduled tylenol.    Labs/RN Managed Protocols: K Mg and Phos protocols    Lines/Drains: PIV infusing TKO between doses of abx, colostomy, purewick in place, PATY drain.    Nutrition: tolerates regular diet.  Fair appetite.  No n/v.    Goal Outcome Evaluation  Plan of Care Reviewed With: patient  Overall Patient Progress: no change  Outcome Evaluation: A&O x 4.  VSS on 1L NC while sleeping.  RA during the day.  Midline incision TATO with staples.  Pt declined to go to MRI this evening.  No other changes.    Barriers to Discharge:   TCU placement

## 2025-06-06 NOTE — PLAN OF CARE
Goal Outcome Evaluation:      Plan of Care Reviewed With: patient    Overall Patient Progress: no changeOverall Patient Progress: no change       CT completed this afternoon. Results pending.  Potassium and Magnesium replaced.  PIV TKO. On IV abx zerbaxa q 8.  Backside of upper R arm bleeding from pt picking on scab this afternoon. Area cleansed and dressing applied. Will pass on to on-coming RN to monitor.  Hearing aids in use.  Upper dentures in use.  BG 96, 173. Fair appetite.  Denies nausea.  PATY drain in place.  Colostomy in place. Minimal OP.  Denies pain.

## 2025-06-06 NOTE — PLAN OF CARE
Goal Outcome Evaluation:                            Shift [id number]         Pain: Denies need for pain medicine    Labs/RN Managed Protocols: K Mg and Phos protocols    Lines/Drains: PIV infusing TKO between doses of abx, colostomy-scant out, purewick in place-200ml out, PATY drain-50ml out.    Nutrition: tolerates regular diet.  Fair appetite.  No n/v.     Goal Outcome Evaluation  Plan of Care Reviewed With: patient  Overall Patient Progress: no change  Outcome Evaluation: A&O x 4.  VSS on 1L NC while sleeping.  RA during the day.  Midline incision TATO with staples.      Barriers to Discharge:   TCU placement

## 2025-06-06 NOTE — PROGRESS NOTES
Care Management Follow Up    Length of Stay (days): 9    Expected Discharge Date: 06/09/2025     Concerns to be Addressed: discharge planning     Patient plan of care discussed at interdisciplinary rounds: Yes    Anticipated Discharge Disposition: Transitional Care  Anticipated Discharge Services: Transportation Services  Anticipated Discharge DME: None    Patient/family educated on Medicare website which has current facility and service quality ratings: yes  Education Provided on the Discharge Plan:  No  Patient/Family in Agreement with the Plan: yes    Referrals Placed by CM/SW:      Accepted  [facility] - TCU  [address] [place] [address]  P: [phone]  F: [phone]  - Will need to be sent with 5 days of ostomy supplies    Private pay costs discussed: Not applicable    Discussed  Partnership in Safe Discharge Planning  document with patient/family: Yes: Document provided     Handoff Completed: Yes, LIZ PCP: Internal handoff referral completed    Additional Information:  Patient not med ready. Anticipate patient will be med ready in 1-2 days.  Therapy team is recommending TCU. Patient is currently Ax2 and overhead lift.    RNCC Yola Caro updated NADEEM after their conversation with the patient and the patient's daughter. The patient is reportedly agreeable to TCU placement. The patient reported to RNCC having a positive experience at [facility] (admitted from this facility). If that facility cannot accept the patient, she requested referrals be sent to highly rated facilities (4-5 stars) near [facility].    NADEEM sent initial referral to central [facility] facility referral requesting the patient be reviewed for TCU placement at [facility] ([facility])    Next Steps: NADEEM will follow up with [facility] on Monday and make additional referrals if needed.  Patient will likely need wheelchair transportation arranged once placement secured.  At time  of discharge PAS and IMM are needed.     NAINA Hill  [address]   Office: [phone]  [email or web address]  Securely message with [employer] (Search Name or 7C Med Surg [phone] )  Text page via [facility] Paging/Directory   See signed in provider for up to date coverage information  Social Work & Care Management Department

## 2025-06-06 NOTE — PROGRESS NOTES
Transplant Infectious Diseases Inpatient Consultation      Luz Thompson MRN# [id number]   YOB: 1950 Age: 75 year old   Date of Admission and time: 5/28/2025 12:43 PM     Reason for consult: Polymicrobial bacteremia in an immunocompromised post-liver transplant patient, now s/p sigmoidectomy with end colostomy for diverticulitis with abscess.        Recommendations:     1. Continue ceftolozane-tazobactam for CR-Pseudomonas and Klebsiella; first day of effective therapy is 6/3.  2. Continue oral linezolid for VRE bacteremia; blood cultures have been negative since 6/3.  3. Plan to complete a 7-day course of therapy (6/3-6/9) per BALANCE trial guidance.  4. CT A/P on 6/6 shows only non-inflammatory pelvic fluid; no evidence of abscess. Appreciate GI and Colorectal Surgery input.  5. Monitor WBC trends. The current elevation appears isolated and of unclear significance.    Transplant infectious diseases will continue to follow.    Over the weekend, we will not routinely see this patient unless requested by the primary team.  Dr. Byrnes will be covering the service and is available via [employer] or [employer].    Case discussed with the attending physician, Dr. Isidro.    Jose Ramon Lee MD  Infectious Diseases Fellow        Synopsis of Clinical Presentation and Course   Transplant Summary  Transplants:  5/22/2002 (Liver); POD  8416.  Coordinator Angelika Frausto  Reason for Transplantation: Liver cirrhosis   Current Immunosuppression: Prednisone  Tacrolimus  Mycophenolate    Ms. 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 She underwent open sigmoidectomy with end colostomy on 5/30/25. Post-operatively, she developed polymicrobial bacteremia with CR-Pseudomonas aeruginosa, Klebsiella pneumoniae, and VRE faecium. All blood cultures cleared by 6/3/25. She has remained afebrile and  clinically stable since.    Today, her WBC increased to 14.6 from a baseline of ~11, prompting a CT A/P to rule out intra-abdominal complications. Imaging showed only a small amount of non-inflammatory pelvic fluid. Drains remain in place and are functioning (130 mL output yesterday). No changes in abdominal exam or new complaints reported. Recommendations remain unchanged with plans to complete 7-day antibiotic course per BALANCE trial guidance.        Active Problems and Infectious Diseases Issues:     Polymicrobial bacteremia (CR-Pseudomonas aeruginosa, Klebsiella pneumoniae, VRE):    Initial blood cultures on 5/28 grew CR-Pseudomonas and Klebsiella pneumoniae; subsequent cultures on 5/31 grew Enterococcus faecium (vancomycin-resistant). Blood cultures have remained negative since 6/3. The patient has been clinically stable, afebrile, and without signs of persistent bacteremia. She is currently receiving ceftolozane-tazobactam (MARYJANE <=2 for both Pseudomonas and Klebsiella) and oral linezolid (MARYJANE 2 for VRE). Given appropriate source control, lack of endocarditis on TTE, and stable response, we recommend completing a 7-day course (6/3-6/9) in line with BALANCE trial evidence.    Post-operative status s/p open sigmoidectomy with end colostomy (5/30/25):    Performed for diverticulitis with pericolic abscess. Postoperative recovery has been generally uncomplicated. Colostomy is functional, and the surgical site shows no evidence of infection. A CT A/P on 6/6 was obtained for isolated leukocytosis (WBC 14.6) and showed only small-volume, non-inflammatory pelvic fluid; no drainable collections or abscesses were identified. Surgical drains are functional (130 mL output yesterday), and no abdominal symptoms are reported.    Immunocompromised host s/p liver transplantation (2002):    On maintenance immunosuppression with tacrolimus. No evidence of acute graft dysfunction. Tacrolimus levels have been stable and she remains  off prednisone currently. History of EBV and Sjögren s syndrome may influence susceptibility to infection and medication tolerance.    Elevated WBC (14.6 on 6/6):    This represents a new isolated leukocytosis without associated fever, hemodynamic instability, or localizing signs. CT A/P shows no inflammatory source. We attribute this to possible post-operative inflammation or reactive physiology but recommend continued monitoring.    Transplant Checklist  - QTc interval: 379 on 6/3/2025  - Pneumocystis prophylaxis: none   - Bacterial prophylaxis: none   - Fungal prophylaxis: none   - Serostatus & viral prophylaxis: none   - Immunization status: Needs RSV, Prevenar-20, Tdap  - Gamma globulin status: IgG 1110 on 8/5/19        Old Problems and Infectious Diseases Issues:     Old Problems and Infectious Diseases Issues:    ESBL UTI and Enterococcus faecalis bacteremia (April 2025):  Treated with IV ertapenem during 4/14-4/23/2025 admission for presumed sigmoid microperforation. No recurrence since that time.    History of recurrent infections and extensive antibiotic allergies:  Patient has a complex allergy history including reported reactions to penicillins, cephalosporins, fluconazole, Bactrim, and multiple others. Intradermal skin testing in 2019 was negative for several agents including penicillins and cephalosporins, but reactions remain documented in chart.    EBV positivity, Sjögren s syndrome, and basal cell carcinoma:  Relevant for immunosuppressive management; most recent skin cancer treated with Mohs surgery on 4/8/2025         Subjective and Interval Events        Patient remains well today and denies abdominal pain, fever, chills, or other systemic symptoms. She is eating and tolerating oral medications. No stoma-related issues. No new drainage from the surgical site.    Today s WBC is 14.6 (up from 10), but CT A/P showed only stable non-inflammatory pelvic fluid. Drains remain functional with 130 mL  output yesterday.             Medications:   Medications that Require Transfusion:   Current Facility-Administered Medications   Medication Dose Route Frequency Provider Last Rate Last Admin     Scheduled Medications:   Current Facility-Administered Medications   Medication Dose Route Frequency Provider Last Rate Last Admin    acetaminophen (TYLENOL) tablet 975 mg  975 mg Oral BID Jah Bettencourt PA-C   975 mg at 06/02/25 1011    [Held by provider] amLODIPine (NORVASC) tablet 5 mg  5 mg Oral Daily Tess Rodriguez MD   5 mg at 06/01/25 0741    apixaban ANTICOAGULANT (ELIQUIS) tablet 2.5 mg  2.5 mg Oral BID Negrito Christensen MD   2.5 mg at 06/06/25 1006    calcitRIOL (ROCALTROL) capsule 0.25 mcg  0.25 mcg Oral Daily Jah Bettencourt PA-C   0.25 mcg at 06/06/25 1008    ceftolozane-tazobactam (ZERBAXA) 1.5 g vial to attach to  ml bag  1.5 g Intravenous Q8H Negrito Christensen MD   1.5 g at 06/06/25 1355    diclofenac (VOLTAREN) 1 % topical gel 4 g  4 g Topical 4x Daily Jah Bettencourt PA-C   4 g at 06/04/25 1933    estradiol (ESTRACE) cream 2 g  2 g Vaginal Once per day on Monday Wednesday Friday Jah Bettencourt PA-C   2 g at 06/04/25 1933    ezetimibe (ZETIA) tablet 10 mg  10 mg Oral At Bedtime Jah Bettencourt PA-C   10 mg at 06/05/25 2135    ferrous sulfate (FEROSUL) tablet 325 mg  325 mg Oral At Bedtime Jah Bettencourt PA-C   325 mg at 06/05/25 2135    folic acid (FOLVITE) tablet 1,000 mcg  1,000 mcg Oral At Bedtime Jah Bettencourt PA-C   1,000 mcg at 06/05/25 2136    gabapentin (NEURONTIN) solution 300 mg  300 mg Oral At Bedtime Jah Bettencourt PA-C   300 mg at 06/05/25 2136    [Held by provider] hydrALAZINE (APRESOLINE) tablet 50 mg  50 mg Oral TID Foizie, Jah K, PA-C        hydrocortisone sodium succinate PF (solu-CORTEF) injection 12.5 mg  12.5 mg Intravenous Q6H Lisa Ferreira MD   12.5 mg at 06/06/25 1644    insulin aspart (NovoLOG) injection (RAPID ACTING)  1-7 Units Subcutaneous TID AC  Tess Rodriguez MD   2 Units at 06/06/25 1654    insulin aspart (NovoLOG) injection (RAPID ACTING)  1-5 Units Subcutaneous At Bedtime Tess Rodriguez MD   1 Units at 06/04/25 2224    levothyroxine (SYNTHROID/LEVOTHROID) tablet 175 mcg  175 mcg Oral QAM AC Jah Bettencourt PA-C   175 mcg at 06/06/25 0819    Lidocaine (LIDOCARE) 4 % Patch 2 patch  2 patch Transdermal Q24H Josefina Perea DO   2 patch at 06/01/25 0740    linezolid (ZYVOX) tablet 600 mg  600 mg Oral Q12H LUZMARIA (08/20) Maribel Landin MD   600 mg at 06/06/25 1006    melatonin tablet 1 mg  1 mg Oral At Bedtime Josefina Perea DO        [Held by provider] metoprolol tartrate (LOPRESSOR) half-tab 12.5 mg  12.5 mg Oral BID Josefina Perea DO   12.5 mg at 05/31/25 0757    multivitamin, therapeutic (THERA-VIT) tablet 1 tablet  1 tablet Oral Daily Josefina Perea DO   1 tablet at 06/06/25 1008    Nutrisource Fiber PO packet 1 each  1 packet Oral Daily Jah Bettencourt PA-C   1 each at 06/02/25 1723    nystatin (MYCOSTATIN) suspension 1,000,000 Units  1,000,000 Units Oral 4x Daily Jah Bettencourt PA-C   1,000,000 Units at 06/06/25 1641    omega-3 acid ethyl esters (LOVAZA) capsule 1 g  1 g Oral BID Tess Rodriguez MD   1 g at 06/06/25 1008    [Held by provider] predniSONE (DELTASONE) half-tab 9 mg  9 mg Oral Daily Negrito Christensen MD        sennosides (SENOKOT) tablet 2 tablet  2 tablet Oral or Feeding Tube BID Norah Jaquez, CNP   2 tablet at 06/06/25 1009    sertraline (ZOLOFT) tablet 50 mg  50 mg Oral Daily Jah Bettencourt PA-C   50 mg at 06/06/25 0959    sodium chloride (PF) 0.9% PF flush 3 mL  3 mL Intracatheter Q8H LUZMARIA Jah Bettencourt PA-C   3 mL at 06/04/25 0542    tacrolimus (PROGRAF BRAND) suspension 2 mg  2 mg Oral BID IS Luzma Osman APRN CNP        ursodiol (ACTIGALL) tablet 250 mg  250 mg Oral BID Jah Bettencourt PA-C   250 mg at 06/06/25 1008    vitamin A 3 MG (54760 UNITS) capsule 20,000 Units  20,000 Units Oral Daily  "Al Campbell MD   20,000 Units at 06/06/25 1009          Physical Exam     Physical Exam     Vital signs:  /59 (BP Location: Left arm)   Pulse 91   Temp 98.2  F (36.8  C) (Oral)   Resp 18   Ht 1.702 m (5' 7\")   Wt 81.5 kg (179 lb 10.8 oz)   LMP 06/01/1988 (Approximate)   SpO2 100%   BMI 28.14 kg/m      Afebrile, /59, HR 91, SpO2 100% on RA.  Alert, no acute distress.  Lungs: Mild crackles, no increased work of breathing.  Cardiac: Irregularly irregular rhythm, no murmurs.  Abdomen: Soft, NT/ND, healing incision without erythema or discharge.  Colostomy: Intact with functional output.  Neuro: Non-focal.    Data     Data   Laboratory data and imaging listed below was reviewed prior to this encounter.     WBC trend: 14.6 (6/6), up from 11.4 (6/5) and 11.5 (6/4)  CT A/P (6/6): Non-inflammatory pelvic fluid; no abscess or concerning findings  Blood cultures: All negative since 6/3  TTE: No vegetations             Jose Ramon Lee MD  [facility]  Contact information available via [facility] Paging/Directory     06/06/2025   "

## 2025-06-06 NOTE — PROGRESS NOTES
[facility]    Progress Note - Colorectal Surgery Service       Date of Admission:  5/28/2025    Assessment & Plan: Surgery   Mrs. Thompson is a 75 year old female with a complex PMH, some of which includes CKD stage 3, CVA and DVT on anticoag, atrial fibrillation, DM2, biliary cirrhosis s/p prior liver transplant admitted for smoldering diverticulitis with 4.1cm abscess on prolonged IV abx as an outpatient.  Was admitted on 5/29 with worsening abdominal pain, hypotension due to gram negative septicemia, diarrhea.  Now s/p exploratory laparotomy, sigmoidectomy and end colostomy on 5/30/25, recovering appropriately with return of bowel function.    Polymicrobial bacteremia likely 2/2 GI source. Having return of bowel function.     - Will discuss with primary medicine team to consider CT scan abd/pelvis given cloudy drain output and increase in leukocytosis   - Reg diet.  Recommend protein nutritional supplements as able.    - SQH, restarted eliquis on 6/4  - Removed vessel loop from incision on 6/6  - Appreciate ID recommendations (now on Zebraxa)  - Hx of liver transplant - immunosuppression per transplant immunology team (changed from sirolimus to tac).  On IV hydrocort taper for stress dosing.   - High dose Vitamin A x30 days post-op for wound healing support in setting of immunosuppression  - Getting senokot BID  - Continue WOC ostomy cares   - Continue PATY drain   - Encourage out of bed and ambulation, incentive spirometer use      Diet: Snacks/Supplements Adult: Other; PRN per pt request; With Meals  Snacks/Supplements Adult: Other; Offer pt Gelatein Plus or 20 TID with meals - please ask what flavor she prefers with each meal; With Meals  Room Service  Advance Diet as Tolerated: Regular Diet Adult; Regular Diet Adult  Snacks/Supplements Adult: Glucerna; Between Meals    DVT Prophylaxis: SQH  Ron Catheter: Not present  Lines: None       Drains: PRESENT         -  "1 Closed/Suction Drain(s)    - 1 Ureteral Drain/Stent(s)   Cardiac Monitoring: ACTIVE order. Indication: Bradycardias (48 hours)  Code Status: Full Code      Clinically Significant Risk Factors        # Hypokalemia: Lowest K = 3 mmol/L in last 2 days, will replace as needed        # Hypoalbuminemia: Lowest albumin = 2.4 g/dL at 5/29/2025  3:15 PM, will monitor as appropriate       # Hypertension: Noted on problem list            # Overweight: Estimated body mass index is 27.73 kg/m  as calculated from the following:    Height as of this encounter: 1.702 m (5' 7\").    Weight as of this encounter: 80.3 kg (177 lb 0.5 oz).        # Financial/Environmental Concerns: none         Social Drivers of Health   Tobacco Use: Medium Risk (5/27/2025)    Patient History     Smoking Tobacco Use: Former     Smokeless Tobacco Use: Never   Physical Activity: Insufficiently Active (11/9/2023)    Received from [facility]    Exercise Vital Sign     Days of Exercise per Week: 5 days     Minutes of Exercise per Session: 10 min         Disposition Plan     Pending tolerance of advancement of diet and return of bowel function      Patient was seen and discussed with colorectal fellow, Dr. Montano, who discussed with colorectal staff, Dr. Shannan Soriano MD  General Surgery PGY-1    **For on call schedules and contact information, please refer to [facility]**      Non-urgent messages: Securely message with [employer] (more info)  Text page via [facility] Paging/Directory     ______________________________________________________________________    Interval History   She says she is tolerating PO intake without nausea/vomiting, has been drinking protein supplements. Pain is well controlled on current regimen. She says she has been ambulating and has been sitting up in the chair. Having gas and stool output from ostomy.      Physical Exam   Vital Signs: Temp: 98.1  F (36.7  C) Temp src: Oral BP: 91/47 Pulse: 80   Resp: 18 SpO2: 100 % O2 Device: " Nasal cannula Oxygen Delivery: 1 LPM  Weight: 177 lbs .47 oz    Intake/Output Summary (Last 24 hours) at 6/5/2025 0657  Last data filed at 6/5/2025 0623  Gross per 24 hour   Intake 400 ml   Output 3925 ml   Net -3525 ml      General Appearance: Alert and oriented, no acute distress  Respiratory: no increased work of breathing   Cardiovascular: regular rate   GI: soft, nondistended, non-tender to palpation around ostomy in LLQ, ostomy pink and well perfused with gas as well as liquid stool, incision clean/dry/intact with staples, PATY with cloudy serosanguinous output 80ml/24hrs   Skin: no rashes    Data     I have personally reviewed the following data over the past 24 hrs:    14.6 (H)  \   7.4 (L)   / 305     144 105 39.0 (H) /  122 (H)   3.0 (L) 28 1.49 (H) \       Imaging results reviewed over the past 24 hrs:   No results found for this or any previous visit (from the past 24 hours).

## 2025-06-07 LAB
ABO + RH BLD: NORMAL
ALBUMIN UR-MCNC: 50 MG/DL
ANION GAP SERPL CALCULATED.3IONS-SCNC: 10 MMOL/L (ref 7–15)
APPEARANCE UR: CLEAR
BACTERIA SPEC CULT: ABNORMAL
BACTERIA SPEC CULT: ABNORMAL
BACTERIA SPEC CULT: NO GROWTH
BILIRUB UR QL STRIP: NEGATIVE
BLD GP AB SCN SERPL QL: NEGATIVE
BLD PROD TYP BPU: NORMAL
BLOOD COMPONENT TYPE: NORMAL
BUN SERPL-MCNC: 38.7 MG/DL (ref 8–23)
C AG RBC QL: POSITIVE
CALCIUM SERPL-MCNC: 7.8 MG/DL (ref 8.8–10.4)
CHLORIDE SERPL-SCNC: 108 MMOL/L (ref 98–107)
CODING SYSTEM: NORMAL
COLOR UR AUTO: YELLOW
CREAT SERPL-MCNC: 1.62 MG/DL (ref 0.51–0.95)
CROSSMATCH: NORMAL
EGFRCR SERPLBLD CKD-EPI 2021: 33 ML/MIN/1.73M2
ERYTHROCYTE [DISTWIDTH] IN BLOOD BY AUTOMATED COUNT: 17.3 % (ref 10–15)
GLUCOSE BLDC GLUCOMTR-MCNC: 197 MG/DL (ref 70–99)
GLUCOSE BLDC GLUCOMTR-MCNC: 203 MG/DL (ref 70–99)
GLUCOSE BLDC GLUCOMTR-MCNC: 224 MG/DL (ref 70–99)
GLUCOSE BLDC GLUCOMTR-MCNC: 250 MG/DL (ref 70–99)
GLUCOSE SERPL-MCNC: 236 MG/DL (ref 70–99)
GLUCOSE UR STRIP-MCNC: 50 MG/DL
HCO3 SERPL-SCNC: 23 MMOL/L (ref 22–29)
HCT VFR BLD AUTO: 20.6 % (ref 35–47)
HGB BLD-MCNC: 6.4 G/DL (ref 11.7–15.7)
HGB BLD-MCNC: 7.1 G/DL (ref 11.7–15.7)
HGB UR QL STRIP: NEGATIVE
ISSUE DATE AND TIME: NORMAL
JK SUP(A) AG RBC QL: NEGATIVE
KETONES UR STRIP-MCNC: NEGATIVE MG/DL
LEUKOCYTE ESTERASE UR QL STRIP: NEGATIVE
MAGNESIUM SERPL-MCNC: 2.4 MG/DL (ref 1.7–2.3)
MCH RBC QN AUTO: 28.1 PG (ref 26.5–33)
MCHC RBC AUTO-ENTMCNC: 31.1 G/DL (ref 31.5–36.5)
MCV RBC AUTO: 90 FL (ref 78–100)
MCV RBC AUTO: 92 FL (ref 78–100)
NITRATE UR QL: NEGATIVE
PH UR STRIP: 5.5 [PH] (ref 5–7)
PHOSPHATE SERPL-MCNC: 3.3 MG/DL (ref 2.5–4.5)
PLATELET # BLD AUTO: 275 10E3/UL (ref 150–450)
POTASSIUM SERPL-SCNC: 4.5 MMOL/L (ref 3.4–5.3)
RBC # BLD AUTO: 2.28 10E6/UL (ref 3.8–5.2)
RBC URINE: 2 /HPF
SODIUM SERPL-SCNC: 141 MMOL/L (ref 135–145)
SP GR UR STRIP: 1.03 (ref 1–1.03)
SPECIMEN EXP DATE BLD: NORMAL
SPECIMEN EXP DATE BLD: NORMAL
SQUAMOUS EPITHELIAL: 1 /HPF
TRANSITIONAL EPI: <1 /HPF
UNIT ABO/RH: NORMAL
UNIT NUMBER: NORMAL
UNIT STATUS: NORMAL
UNIT TYPE ISBT: 9500
UROBILINOGEN UR STRIP-MCNC: NORMAL MG/DL
WBC # BLD AUTO: 16.8 10E3/UL (ref 4–11)
WBC URINE: 4 /HPF

## 2025-06-07 PROCEDURE — 36415 COLL VENOUS BLD VENIPUNCTURE: CPT

## 2025-06-07 PROCEDURE — 250N000011 HC RX IP 250 OP 636: Performed by: PHYSICIAN ASSISTANT

## 2025-06-07 PROCEDURE — P9016 RBC LEUKOCYTES REDUCED: HCPCS

## 2025-06-07 PROCEDURE — 83735 ASSAY OF MAGNESIUM: CPT | Performed by: STUDENT IN AN ORGANIZED HEALTH CARE EDUCATION/TRAINING PROGRAM

## 2025-06-07 PROCEDURE — 86905 BLOOD TYPING RBC ANTIGENS: CPT

## 2025-06-07 PROCEDURE — 86922 COMPATIBILITY TEST ANTIGLOB: CPT

## 2025-06-07 PROCEDURE — 250N000013 HC RX MED GY IP 250 OP 250 PS 637: Performed by: SURGERY

## 2025-06-07 PROCEDURE — 250N000013 HC RX MED GY IP 250 OP 250 PS 637: Performed by: NURSE PRACTITIONER

## 2025-06-07 PROCEDURE — 250N000011 HC RX IP 250 OP 636

## 2025-06-07 PROCEDURE — 81001 URINALYSIS AUTO W/SCOPE: CPT

## 2025-06-07 PROCEDURE — 250N000013 HC RX MED GY IP 250 OP 250 PS 637

## 2025-06-07 PROCEDURE — 85018 HEMOGLOBIN: CPT

## 2025-06-07 PROCEDURE — 80048 BASIC METABOLIC PNL TOTAL CA: CPT

## 2025-06-07 PROCEDURE — 99233 SBSQ HOSP IP/OBS HIGH 50: CPT | Mod: GC | Performed by: STUDENT IN AN ORGANIZED HEALTH CARE EDUCATION/TRAINING PROGRAM

## 2025-06-07 PROCEDURE — 250N000011 HC RX IP 250 OP 636: Mod: JZ

## 2025-06-07 PROCEDURE — 120N000002 HC R&B MED SURG/OB UMMC

## 2025-06-07 PROCEDURE — 250N000013 HC RX MED GY IP 250 OP 250 PS 637: Performed by: PHYSICIAN ASSISTANT

## 2025-06-07 PROCEDURE — 250N000013 HC RX MED GY IP 250 OP 250 PS 637: Performed by: INTERNAL MEDICINE

## 2025-06-07 PROCEDURE — 86900 BLOOD TYPING SEROLOGIC ABO: CPT

## 2025-06-07 PROCEDURE — 85018 HEMOGLOBIN: CPT | Performed by: PHYSICIAN ASSISTANT

## 2025-06-07 PROCEDURE — 84100 ASSAY OF PHOSPHORUS: CPT | Performed by: STUDENT IN AN ORGANIZED HEALTH CARE EDUCATION/TRAINING PROGRAM

## 2025-06-07 PROCEDURE — 250N000012 HC RX MED GY IP 250 OP 636 PS 637: Performed by: NURSE PRACTITIONER

## 2025-06-07 RX ORDER — DIPHENHYDRAMINE HCL 25 MG
25 CAPSULE ORAL EVERY 6 HOURS PRN
Status: DISCONTINUED | OUTPATIENT
Start: 2025-06-07 | End: 2025-06-12 | Stop reason: HOSPADM

## 2025-06-07 RX ORDER — DIPHENHYDRAMINE HYDROCHLORIDE 50 MG/ML
25 INJECTION, SOLUTION INTRAMUSCULAR; INTRAVENOUS EVERY 6 HOURS PRN
Status: DISCONTINUED | OUTPATIENT
Start: 2025-06-07 | End: 2025-06-12 | Stop reason: HOSPADM

## 2025-06-07 RX ADMIN — CEFTOLOZANE AND TAZOBACTAM 1.5 G: 1; .5 INJECTION, POWDER, LYOPHILIZED, FOR SOLUTION INTRAVENOUS at 04:43

## 2025-06-07 RX ADMIN — SENNOSIDES 2 TABLET: 8.6 TABLET, FILM COATED ORAL at 09:24

## 2025-06-07 RX ADMIN — APIXABAN 2.5 MG: 2.5 TABLET, FILM COATED ORAL at 21:09

## 2025-06-07 RX ADMIN — LINEZOLID 600 MG: 600 TABLET, FILM COATED ORAL at 09:24

## 2025-06-07 RX ADMIN — LEVOTHYROXINE SODIUM 175 MCG: 0.17 TABLET ORAL at 09:25

## 2025-06-07 RX ADMIN — URSODIOL 250 MG: 250 TABLET ORAL at 09:24

## 2025-06-07 RX ADMIN — HYDROCORTISONE SODIUM SUCCINATE 12.5 MG: 100 INJECTION, POWDER, FOR SOLUTION INTRAMUSCULAR; INTRAVENOUS at 16:36

## 2025-06-07 RX ADMIN — HYDROCORTISONE SODIUM SUCCINATE 12.5 MG: 100 INJECTION, POWDER, FOR SOLUTION INTRAMUSCULAR; INTRAVENOUS at 09:22

## 2025-06-07 RX ADMIN — CEFTOLOZANE AND TAZOBACTAM 1.5 G: 1; .5 INJECTION, POWDER, LYOPHILIZED, FOR SOLUTION INTRAVENOUS at 20:56

## 2025-06-07 RX ADMIN — OMEGA-3-ACID ETHYL ESTERS 1 G: 1 CAPSULE, LIQUID FILLED ORAL at 09:20

## 2025-06-07 RX ADMIN — HYDROCORTISONE SODIUM SUCCINATE 12.5 MG: 100 INJECTION, POWDER, FOR SOLUTION INTRAMUSCULAR; INTRAVENOUS at 21:00

## 2025-06-07 RX ADMIN — NYSTATIN 1000000 UNITS: 100000 SUSPENSION ORAL at 09:22

## 2025-06-07 RX ADMIN — URSODIOL 250 MG: 250 TABLET ORAL at 21:09

## 2025-06-07 RX ADMIN — Medication 2 MG: at 09:21

## 2025-06-07 RX ADMIN — FERROUS SULFATE TAB 325 MG (65 MG ELEMENTAL FE) 325 MG: 325 (65 FE) TAB at 21:09

## 2025-06-07 RX ADMIN — THERA TABS 1 TABLET: TAB at 09:25

## 2025-06-07 RX ADMIN — CALCITRIOL CAPSULES 0.25 MCG 0.25 MCG: 0.25 CAPSULE ORAL at 09:25

## 2025-06-07 RX ADMIN — ONDANSETRON 4 MG: 2 INJECTION, SOLUTION INTRAMUSCULAR; INTRAVENOUS at 14:29

## 2025-06-07 RX ADMIN — SERTRALINE HYDROCHLORIDE 50 MG: 50 TABLET ORAL at 09:25

## 2025-06-07 RX ADMIN — Medication 2 MG: at 18:07

## 2025-06-07 RX ADMIN — NYSTATIN 1000000 UNITS: 100000 SUSPENSION ORAL at 13:05

## 2025-06-07 RX ADMIN — Medication 1000 MCG: at 21:09

## 2025-06-07 RX ADMIN — Medication 20000 UNITS: at 09:23

## 2025-06-07 RX ADMIN — OMEGA-3-ACID ETHYL ESTERS 1 G: 1 CAPSULE, LIQUID FILLED ORAL at 21:09

## 2025-06-07 RX ADMIN — HYDROCORTISONE SODIUM SUCCINATE 12.5 MG: 100 INJECTION, POWDER, FOR SOLUTION INTRAMUSCULAR; INTRAVENOUS at 04:39

## 2025-06-07 RX ADMIN — EZETIMIBE 10 MG: 10 TABLET ORAL at 21:09

## 2025-06-07 RX ADMIN — APIXABAN 2.5 MG: 2.5 TABLET, FILM COATED ORAL at 09:26

## 2025-06-07 RX ADMIN — GABAPENTIN 300 MG: 250 SOLUTION ORAL at 21:07

## 2025-06-07 RX ADMIN — NYSTATIN 1000000 UNITS: 100000 SUSPENSION ORAL at 20:54

## 2025-06-07 RX ADMIN — LINEZOLID 600 MG: 600 TABLET, FILM COATED ORAL at 21:09

## 2025-06-07 RX ADMIN — CEFTOLOZANE AND TAZOBACTAM 1.5 G: 1; .5 INJECTION, POWDER, LYOPHILIZED, FOR SOLUTION INTRAVENOUS at 13:05

## 2025-06-07 RX ADMIN — DIPHENHYDRAMINE HYDROCHLORIDE 25 MG: 25 SOLUTION ORAL at 18:31

## 2025-06-07 ASSESSMENT — ACTIVITIES OF DAILY LIVING (ADL)
ADLS_ACUITY_SCORE: 60
ADLS_ACUITY_SCORE: 64
ADLS_ACUITY_SCORE: 60
ADLS_ACUITY_SCORE: 64
ADLS_ACUITY_SCORE: 60
ADLS_ACUITY_SCORE: 64
ADLS_ACUITY_SCORE: 60
ADLS_ACUITY_SCORE: 64
ADLS_ACUITY_SCORE: 60
ADLS_ACUITY_SCORE: 60
ADLS_ACUITY_SCORE: 64
ADLS_ACUITY_SCORE: 60

## 2025-06-07 NOTE — PROVIDER NOTIFICATION
"Provider (Stanley Matute) paged via [employer]:    \"Hi, pt Hbg result this morning is 6.4  No signed of bleeding noted.\"  "

## 2025-06-07 NOTE — PLAN OF CARE
"Blood pressure 116/76, pulse 88, temperature 98.6  F (37  C), temperature source Oral, resp. rate 16, height 1.702 m (5' 7\"), weight 81.5 kg (179 lb 10.8 oz), last menstrual period 06/01/1988, SpO2 99%, Via RA         Assumed care at 07-15:30    Patient A & O X 4 , VSS  denies pain no respiratory distress noted . Generalized bruising. PATY intact ,Colostomy Pure wick in place with adequate urine output and urine sample sent.  Appetite fair , 197 insulin given per range . Hgb 7.1 and has order for one unite PRBC , and Blood bank in possess when it is ready will transfuse , and will update on coming RN .  Call light within reach, Patient able to make needs known. Continue with POC and update MD with change         Goal Outcome Evaluation:      Plan of Care Reviewed With: patient    Overall Patient Progress: no change Low Hgb 7.1 needs transfusions yet .               "

## 2025-06-07 NOTE — PROGRESS NOTES
[facility]    Progress Note - Medicine Service, JOVANNY TEAM 3       Date of Admission:  5/28/2025    Assessment & Plan     Luz Thompson is a 75 year old female who was admitted on 5/28/2025 with past medical history of atrial fibrillation, DVT on eliquis, CVA, CKD III, HFpEF (TTE 3/9/25 EF 60-65%), T2DM, biliary cirrhosis s/p liver transplant in 2002, EBV, HTN, HLD, Sjogren's syndrome, Basal Cell Carincoma s/p MOHS on 4/8/25, thyroid cancer s/p thyroidectomy in 2010, Sjogren's syndrome, and diverticulitis with recent admission from 4/14-4/23 for ESBL UTI & E. Faecalis bacteremia related to sigmoid microperforation (treated with IV ertapenem) who is admitted for abdominal pain and hypotension, found to have smoldering diverticulitis with 4.1cm abscess s/p open sigmoidectomy and end colostomy on 5/30/25, with Psuedomonas positive blood cultures drawn 5/28.  Ongoing IV antibiotics due to bacteremia (GPC and VRE) and weaning off stress dose steroids.     Today:  -1 unit of pRBC  - UA for increasing WC (negative)  - No localizing symptoms, ID recommending no changes       # Diverticulitis c/b abscess s/p open sigmoidectomy with end colostomy 5/30/25  # Sepsis 2/2 Klebsiella pneumoniae and Pseudomonas aeruginosa bacteremia 5/28, likely of intra-abdominal origin   # Hypotension 2/2 sepsis  Patient now hypertensive, after coming off pressors on 5/30. Currently with ongoing IV steroid taper, will plan to stop continuous maintenance fluids. Will hold PTA antihypertensives while titrating and resume when able.  Transitioned from meropenem to ceftolozane-tazobactam due to resistance (psuedomonas coverage) and switch vancomycin to linezolid iso DERREK for coverage of previous VRE E Facecium.  From ID perspective, there is no good oral antibiotic options and patient will complete 7-day IV antibiotic course at the hospital (first day of blood culture clearance 6/3). New cloudy  PATY drain output on 6/6. Ongoing leukocytosis but no new localizing symptoms so will continue to monitor.   -CT A/P negative abscess/fluid collection   - CRS consulted              - ADAT  - subcutaneous heparin for DVT ppx, continue to hold eliquis for now  - Hx of liver transplant - immunosuppression per transplant immunology team   - High dose Vitamin A x30 days post-op for wound healing support in setting of immunosuppression  - senokot BID  - Continue WOC ostomy cares   - Continue PATY drain   - Encourage out of bed and ambulation, incentive spirometer use   - Transplant ID following  - meropenem (5/31- 6/3) for pseudomonas coverage  - linezolid (6/1 -6/3)  for enterococcal bacteremia  - Ceftolozane-Tazobactam (6/3-6/9), for a 7-day course (Bcx clear from 6/3)   - Pain regimen:              - Scheduled: Robaxin QID, tylenol 975mg BID, gabapentin 300mg QHS, Lidocaine patches daily, Diclofenac QID  -  PRN: PO oxycodone 5mg  - Blood cultures drawn 5/28 positive for Klebsiella and Pseudomonas  - Bcx (5/31): E Faecium VRE  - Bcx (6/2): GPC in pairs and chains   - Bcx (6/3): NGTD   - Continue Hydrocortisone 12.5mg g7kller  - If decreasing back to IS dosing, would pause on pred 10 until tac level therapeutic (goal 3-5)   - hold PTA amlodipine while tapering fluids and IV steroids    # Acute blood loss anemia    # Anemia of critical illness  # coagulopathy due to cirrhosis and surgical blood loss    # thrombocytopenia   # anemia of critical illness   No overt bleeding and ostomy output/vitals stable.   - Transfuse if hgb <7.0 or signs/symptoms of hypoperfusion. Monitor and trend.   - PTA Ferous sulfate, folic acid  - Transfused 1 unit pRBC (6/7)     #C/f TIA   In the morning of 6/4, patient states that she has had speech problems in the last couple of days since the surgery.  She describes that she has trouble getting her words out and that her speech does not feel as fluent as it was previously.  She denies any new  numbness or tingling.  No headache or changes in vision.  Nonfocal on exam.  CT head negative for intracranial bleed or other acute process.  TTE with bubble also negative.  Certainly possible that patient may have had a TIA following surgery as anticoagulation had been held.  Will proceed with MRI brain negative for acute infarct.    - Continue PTA Xarelto     # DERREK on CKD3, resolved   # Bilateral stent placement per urology 5/29  # ATN  DERREK in the setting of hypotension, shock, nephrotoxic meds which is now resolved after IVF and improved PO after surgery.   - Baseline creatinine 1.3-1.6  - external catheter   - PTA estradiol vaginal  cream     # Primary biliary cirrhosis s/p liver transplant 2002  # EBV +  # Reactive Leukocytosis   # Sjogren's syndrome  - Transplant Hepatology consulted  - No sirolimus  - Resumed PTA Prednisone 9 mg PO   - Tac 1 mg po BID per transplant hep              - ordered suspension formulation instead of capsule due to gelatin allergy  - CMP in AM  - PTA Nystatin QID  - PTA Ursodiol      # Acute post-op pain  # high risk delirium   - delirium precaution   - Monitor neurological status. Delirium preventions and precautions.   - pain regimen as above  - PTA sertraline  - melatonin Q HS  - PRN narcan     # Protein calorie deficit malnutrition    - Nutrition consulted  - Supplements: Gelatin Plus TID with meals + PRN per patient request  - Thera-Vit 1 tablet daily   - Vitamin A level (ordered)     # Post operative ventilatory support, resolved  # Acute hypoxic respiratory support   Currently on 1L NC.  - Supplemental oxygen to keep saturation above 92 %.  - PRN albuterol      # Paroxysmal A-fib on apixaban   # Hypertension  # HFpEF (TTE 3/9/25 EF 60-65%)   # history of CVA  # Vasoplagia  - Monitor hemodynamic status.  - Levophed off since 0400 5/31  - MAP goal >65  - PTA ezetimibe  - holding PTA amlodipine 5mg BID  - Continue PTA apixaban 2.5mg BID  - Holding metoprolol tartrate 12.5 mg  "BID  - holding hydralazine, when reordering would prefer 2 25 mg tabs   - SBP >100 or MAP >65  - weaning steroids as above      # Type 2 diabetes  # Stress hyperglycemia    - MDSSI  - Goal to keep BG< 180 for optimal wound healing   - Glucose AC/HS  - hypoglycemic protocol  - On prednisone, transitioned from hydrocortisone      # Thyroid cancer status post thyroidectomy 2010  - PTA calcitriol 0.25mcg  - PTA levothyroxine 175mcg     # Weakness and deconditioning of critical illness   - Physical and occupational therapy consult   - up with assist, fall precaution      # Basal cell carcinoma status post Mohs April 2025           Diet: Snacks/Supplements Adult: Other; PRN per pt request; With Meals  Snacks/Supplements Adult: Other; Offer pt Gelatein Plus or 20 TID with meals - please ask what flavor she prefers with each meal; With Meals  Room Service  Advance Diet as Tolerated: Regular Diet Adult; Regular Diet Adult  Snacks/Supplements Adult: Glucerna; Between Meals    DVT Prophylaxis: Eliquis  Ron Catheter: Not present  Fluids: Bolus  Lines: None     Cardiac Monitoring: ACTIVE order. Indication: Bradycardias (48 hours)  Code Status: Full Code      Clinically Significant Risk Factors        # Hypokalemia: Lowest K = 3 mmol/L in last 2 days, will replace as needed   # Hyperchloremia: Highest Cl = 108 mmol/L in last 2 days, will monitor as appropriate          # Hypoalbuminemia: Lowest albumin = 2.4 g/dL at 5/29/2025  3:15 PM, will monitor as appropriate     # Hypertension: Noted on problem list            # Overweight: Estimated body mass index is 28.14 kg/m  as calculated from the following:    Height as of this encounter: 1.702 m (5' 7\").    Weight as of this encounter: 81.5 kg (179 lb 10.8 oz).        # Financial/Environmental Concerns: none         Disposition Plan     Medically Ready for Discharge: Anticipated in 2-4 Days         The patient's care was discussed with the Attending Physician, Dr. Russo.    Negrito " MD Amparo  Medicine Service, MAROON TEAM 3  [facility]  Securely message with [employer] (more info)  Text page via [employer] Paging/Directory   See signed in provider for up to date coverage information  ______________________________________________________________________    Interval History   NAEO. Doing well overall. No concerns with her speech today. Asks about the brain MRI and informed that there was no acute infarct. No new pain or overt bleeding.     Physical Exam   Vital Signs: Temp: 97.5  F (36.4  C) Temp src: Oral BP: 114/65 Pulse: 72   Resp: 16 SpO2: 99 % O2 Device: None (Room air)    Weight: 179 lbs 10.8 oz  Gen: Comfortable appearing older woman lying in bed, in no distress  HEENT: Atraumatic, nonicteric sclera  CV: RRR, limbs well perfused. No accessory heart sounds.   Pulm: Clear to auscultation in all fields, no wheezing or crackles  Abd: Nontender, nondistended abdomen. Ostomy output stable, no change in characteristic. PATY drain in place, serous output.   Skin: No lesions on exposed skin  Neuro: Appears alert and is appropriately conversational, moving all limbs spontaneously  Psych: Appropriate to the situation      Medical Decision Making       Please see A&P for additional details of medical decision making.      Data   Recent Results (from the past week)   Glucose by meter   Result Value Ref Range Status    GLUCOSE BY METER POCT 183 (H) 70 - 99 mg/dL Final   Basic metabolic panel   Result Value Ref Range Status    Sodium 138 135 - 145 mmol/L Final    Potassium 4.8 3.4 - 5.3 mmol/L Final    Chloride 108 (H) 98 - 107 mmol/L Final    Carbon Dioxide (CO2) 19 (L) 22 - 29 mmol/L Final    Anion Gap 11 7 - 15 mmol/L Final    Urea Nitrogen 76.0 (H) 8.0 - 23.0 mg/dL Final    Creatinine 2.32 (H) 0.51 - 0.95 mg/dL Final    GFR Estimate 21 (L) >60 mL/min/1.73m2 Final    Calcium 7.7 (L) 8.8 - 10.4 mg/dL Final    Glucose 194 (H) 70 - 99 mg/dL Final   Magnesium   Result  Value Ref Range Status    Magnesium 2.3 1.7 - 2.3 mg/dL Final   Phosphorus   Result Value Ref Range Status    Phosphorus 5.1 (H) 2.5 - 4.5 mg/dL Final   CBC with platelets   Result Value Ref Range Status    WBC Count 14.6 (H) 4.0 - 11.0 10e3/uL Final    RBC Count 2.65 (L) 3.80 - 5.20 10e6/uL Final    Hemoglobin 7.3 (L) 11.7 - 15.7 g/dL Final    Hematocrit 23.0 (L) 35.0 - 47.0 % Final    MCV 87 78 - 100 fL Final    MCH 27.5 26.5 - 33.0 pg Final    MCHC 31.7 31.5 - 36.5 g/dL Final    RDW 18.5 (H) 10.0 - 15.0 % Final    Platelet Count 177 150 - 450 10e3/uL Final     *Note: Due to a large number of results and/or encounters for the requested time period, some results have not been displayed. A complete set of results can be found in Results Review.

## 2025-06-07 NOTE — PROGRESS NOTES
[facility]    Progress Note - Colorectal Surgery Service       Date of Admission:  5/28/2025    Assessment & Plan: Surgery   Mrs. Thompson is a 75 year old female with a complex PMH, some of which includes CKD stage 3, CVA and DVT on anticoag, atrial fibrillation, DM2, biliary cirrhosis s/p prior liver transplant admitted for smoldering diverticulitis with 4.1cm abscess on prolonged IV abx as an outpatient.  Was admitted on 5/29 with worsening abdominal pain, hypotension due to gram negative septicemia, diarrhea.  Now s/p exploratory laparotomy, sigmoidectomy and end colostomy on 5/30/25, recovering appropriately with return of bowel function.    Polymicrobial bacteremia likely 2/2 GI source. Having return of bowel function. CT A/P on 6/6 without intra-abd fluid collections nor acute intra-abd pathology. Getting 1U of pRBCs for Hgb of 7.1. No active bleeding on exam.     - Reg diet.  Recommend protein nutritional supplements as able.    - restarted eliquis on 6/4  - Appreciate ID recommendations (now on Zebraxa as well as DAptomycin)  - Hx of liver transplant - immunosuppression per transplant immunology team (changed from sirolimus to tac).  On IV hydrocort taper for stress dosing.   - High dose Vitamin A x30 days post-op for wound healing support in setting of immunosuppression  - Getting senokot BID  - Continue WOC ostomy cares   - Continue PATY drain   - Encourage out of bed and ambulation, incentive spirometer use      Diet: Snacks/Supplements Adult: Other; PRN per pt request; With Meals  Snacks/Supplements Adult: Other; Offer pt Gelatein Plus or 20 TID with meals - please ask what flavor she prefers with each meal; With Meals  Room Service  Advance Diet as Tolerated: Regular Diet Adult; Regular Diet Adult  Snacks/Supplements Adult: Glucerna; Between Meals    DVT Prophylaxis: SQH  Ron Catheter: Not present  Lines: None       Drains: PRESENT         - 1  "Closed/Suction Drain(s)    - 1 Ureteral Drain/Stent(s)   Cardiac Monitoring: ACTIVE order. Indication: Bradycardias (48 hours)  Code Status: Full Code      Clinically Significant Risk Factors        # Hypokalemia: Lowest K = 3 mmol/L in last 2 days, will replace as needed   # Hyperchloremia: Highest Cl = 108 mmol/L in last 2 days, will monitor as appropriate          # Hypoalbuminemia: Lowest albumin = 2.4 g/dL at 5/29/2025  3:15 PM, will monitor as appropriate       # Hypertension: Noted on problem list            # Overweight: Estimated body mass index is 28.14 kg/m  as calculated from the following:    Height as of this encounter: 1.702 m (5' 7\").    Weight as of this encounter: 81.5 kg (179 lb 10.8 oz).        # Financial/Environmental Concerns: none         Social Drivers of Health   Tobacco Use: Medium Risk (5/27/2025)    Patient History     Smoking Tobacco Use: Former     Smokeless Tobacco Use: Never   Physical Activity: Insufficiently Active (11/9/2023)    Received from [facility]    Exercise Vital Sign     Days of Exercise per Week: 5 days     Minutes of Exercise per Session: 10 min         Disposition Plan     Pending tolerance of advancement of diet and return of bowel function      Patient was seen and discussed with colorectal fellow who will discuss with staff.     **For on call schedules and contact information, please refer to [facility]**      Non-urgent messages: Securely message with [employer] (more info)  Text page via [facility] Paging/Directory     ______________________________________________________________________    Interval History   CT A/P yesterday without fluid collections or intra-abd pathology. She denies being in any acute distress this morning.     Physical Exam   Vital Signs: Temp: 97.5  F (36.4  C) Temp src: Oral BP: 114/65 Pulse: 72   Resp: 16 SpO2: 99 % O2 Device: None (Room air)    Weight: 179 lbs 10.8 oz    Intake/Output Summary (Last 24 hours) at 6/7/2025 0852  Last data filed at " 6/7/2025 0549  Gross per 24 hour   Intake 2071 ml   Output 1145 ml   Net 926 ml      General Appearance: Alert and oriented, no acute distress  Respiratory: no increased work of breathing   Cardiovascular: regular rate   GI: soft, nondistended, non-tender to palpation around ostomy in LLQ, ostomy pink and well perfused with gas as well as liquid stool, incision clean/dry/intact with staples, PATY with cloudy serous output  Skin: no rashes    Data     I have personally reviewed the following data over the past 24 hrs:    16.8 (H)  \   7.1 (L)   / 275     141 108 (H) 38.7 (H) /  203 (H)   4.5 23 1.62 (H) \     ALT: N/A AST: N/A AP: N/A TBILI: 0.4   ALB: N/A TOT PROTEIN: N/A LIPASE: N/A     Ferritin:  N/A % Retic:  N/A LDH:  275 (H)       Imaging results reviewed over the past 24 hrs:   Recent Results (from the past 24 hours)   CT Abdomen Pelvis w Contrast    Narrative    EXAMINATION: CT ABDOMEN PELVIS W CONTRAST, 6/6/2025 2:18 PM    INDICATION: concern from intra-abdominal infection in the setting of  bacteremia, increasing leukocytosis, cloudy PATY drain output post-op    COMPARISON STUDY: CT abdomen 5/29/2025    TECHNIQUE: CT scan of the abdomen and pelvis was performed on  multidetector CT scanner using volumetric acquisition technique and  images were reconstructed in multiple planes with variable thickness  and reviewed on dedicated workstations.     CONTRAST: 108cc of isovue 370 IV without oral contrast    CT scan radiation dose is optimized to minimum requisite dose using  automated dose modulation techniques.    FINDINGS:    Lower thorax: Bibasilar opacities are noted. Small bilateral pleural  effusion with adjacent atelectatic changes. Chronic calcifications in  the right lower lobe. Atherosclerotic calcification in the partially  imaged coronary arteries.    Liver: Postsurgical changes of liver transplantation. No focal mass.  Mild intrahepatic biliary dilatation in the right lobe segment 5 is  unchanged from  prior studies dating back to 3/8/2025. No focal hepatic  mass.    Biliary System: Cholecystectomy    Pancreas: Atrophic appearance of the pancreas with multiple pancreatic  head cysts, the largest measuring approximately 1.5 cm, likely  representing side-branch intraductal papillary mucinous neoplasms  (IPMNs) (series 4, image 182).     Adrenal glands: No mass or nodules    Spleen: Normal.    Kidneys: Bilateral renal atrophy . Multiple benign-appearing renal  cysts. No evidence of hydronephrosis.    Gastrointestinal tract: Normal caliber small bowel. No abnormal  gastric or bowel distention. The appendix is not visualized.  Postsurgical changes consistent with prior Izabella?s procedure, with  end colostomy. Surrounding fat stranding are present, likely  reflecting postoperative edema. No evidence of abscess, or  obstruction. Colonic diverticulosis without evidence of  diverticulitis. Drain is in place in the lower abdomen. Small  low-density fluid collection in bilateral paracolic gutters.    Pelvis: Urinary bladder is normal. No pelvic mass.    Mesentery/peritoneum/retroperitoneum: Small amount of fluid in the  pelvis and paracolic gutters. No free air.    Lymph nodes: No significant lymphadenopathy.    Vasculature: Diffuse aortoiliac atherosclerosis without aneurysmal  dilatation.    Soft tissues: Rectus abdominous muscle atrophy. Diffuse anasarca.  Small fat-containing ventral?hernias.     Osseous structures: No aggressive or acute osseous lesion. Diffuse  osteopenia. Multilevel thoracolumbar degenerative changes.      Impression    IMPRESSION:   1. 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  Mild fat stranding surrounding the descending colon without wall  thickening is likely postoperative edema. There is small amount of  free fluid in the pelvis and paracolic gutters. No evidence of  organized fluid collection or abscess.    2. Colonic diverticulosis without acute diverticulitis.  3.  Bilateral pleural effusions with adjacent atelectasis.  4. Postsurgical changes of liver transplantation with mild  intrahepatic biliary dilatation in the right lobe segment 5 is  unchanged from prior studies dating back to 3/8/2025.  5. Stable chronic incidental findings as described int eh body of the  report.    I have personally reviewed the examination and initial interpretation  and I agree with the findings.    KAYLIE BLAS MD         SYSTEM ID:  [id number]   MR Brain w/o Contrast    Narrative    MR BRAIN W/O CONTRAST 6/6/2025 6:28 PM    Provided History: Changes in speech, has history of stroke, evaluate  for any new evidence of stroke  ICD-10:    Comparison:  Brain MR 5/18/2018 and head CT 6/4/2025    Technique: Sagittal T1-weighted, coronal T2-weighted, axial T2 FLAIR,  axial susceptibility weighted, and axial and coronal  diffusion-weighted with ADC map images of the brain were obtained  without intravenous contrast.    Findings: No intracranial mass lesion, mass effect, midline shift, or  abnormal fluid collection. Moderate parenchymal volume loss. Diffuse  deep and subcortical white matter T2 FLAIR hyperintensities, likely  secondary to chronic ischemic vessel disease. The ventricles and sulci  are normal for age. No abnormality of reduced diffusion.  Normal  intravascular flow voids.      Impression    Impression:   1. No acute infarct.  2. Moderate to severe leukoaraiosis, unchanged and likely secondary to  chronic ischemic vessel disease.    I have personally reviewed the examination and initial interpretation  and I agree with the findings.    ALAN AZAR MD         SYSTEM ID:  [id number]

## 2025-06-07 NOTE — PLAN OF CARE
Goal Outcome Evaluation:     Care from [id number]       Plan of Care Reviewed With: patient, child    Overall Patient Progress: no changeOverall Patient Progress: no change    Outcome Evaluation: Patient is A&Ox4 still on 1L o2 sats in mid to high 90s. On tele running controlled AFIB hr in 90-to low 100s. Ray some crackles lower left lobe, encouraged patient to use the IS went over instructions and watched patient use did 10 repititions and hit goal of 750. Went over instructions on how to properly empty the colostomy and had patient do it. Patient did well. Was unable to see the stoma site do to the consistency of stool. Patient was able to tolerate the MRI tonight. Bed alarm is set with call light and belongings in place will contnue to monitor.  Changed the PATY dressing site and emptied twice for a total of 150 ml serous fluid. Good urine output charted 300 out and emptied container replaced the primafit with a new one.

## 2025-06-07 NOTE — PLAN OF CARE
"/61 (BP Location: Left arm)   Pulse 108   Temp 98.4  F (36.9  C) (Oral)   Resp 18   Ht 1.702 m (5' 7\")   Wt 81.5 kg (179 lb 10.8 oz)   LMP 06/01/1988 (Approximate)   SpO2 99%   BMI 28.14 kg/m      Assumed  cares: 2542-9831    Neuro: A&Ox4.   Cardiac: afebrile, on tele, reading A.fib with HR 80's-in low 100's. VSS.   Respiratory: SATs>95% on 1 L NC while asleep and RA during the day.  GI/: Adequate urine output via external catheter. Colostomy in place, putting out green soft stools. Right PATY drain in place.  Diet/appetite: Tolerating regular diet, pt on calorie counts. Denies n/v. ACHS blood sugar checks.takes meds one at a time.  Activity:  Assist of 2 with lift. Bed alarm on for safety. Not OOB on this shift.   Pain: At acceptable level on current regimen. Denies pain.   Skin: No new deficits noted. Midline abdominal incision TATO with staples.  LDA's: right PIV, SL.    Plan: pt Hbg result-6.4, no signed of bleeding noted, provider notified, redraw ordered place, await lab to collected. Report given to on-coming nurse. Continue with POC. Notify primary team with changes.   Problem: Adult Inpatient Plan of Care  Goal: Plan of Care Review  Description: The Plan of Care Review/Shift note should be completed every shift.  The Outcome Evaluation is a brief statement about your assessment that the patient is improving, declining, or no change.  This information will be displayed automatically on your shift  note.  Recent Flowsheet Documentation  Taken 6/7/2025 0202 by Amaris Cardenas, RN  Plan of Care Reviewed With:   patient   child  Overall Patient Progress: no change   Goal Outcome Evaluation:      Plan of Care Reviewed With: patient, child    Overall Patient Progress: no changeOverall Patient Progress: no change               "

## 2025-06-07 NOTE — PROGRESS NOTES
Care Management Follow Up    Length of Stay (days): 10  Expected Discharge Date: 06/09/2025  Concerns to be Addressed: discharge planning     Patient plan of care discussed at interdisciplinary rounds: Yes  Anticipated Discharge Disposition: Transitional Care  Anticipated Discharge Services: Transportation Services  Anticipated Discharge DME: None  Patient/family educated on Medicare website which has current facility and service quality ratings: yes  Education Provided on the Discharge Plan:    Patient/Family in Agreement with the Plan: yes  Referrals Placed by CM/SW:    Private pay costs discussed: Not applicable  Discussed  Partnership in Safe Discharge Planning  document with patient/family: No   Handoff Completed: No, handoff not indicated or clinically appropriate  Additional Information: Handoff received from weekday  to determine if pt is ready for placement over the weekend. Per [facility] 3 Provider, this patient is not medically ready for discharge and not anticipated to be ready until Monday. The patient has PT & OT disposition recommendations for TCU. Of note, this patient has been clinically accepted to [facility] for TCU and patient is in agreement.    [facility] (Ph: [phone]; F: [phone])  [address] [place] [id number]  Clinically accepted  For MONDAY/pending discharge date:   *Please provide all discharge orders at least 2 hours prior to patient arrival.   *If patient needing IV abx, please ensure they have ACCESS (PICC, etc).   *If PRIOR AUTHORIZATION required, please initiate process.   *Please advise of pt's transportation information/estimated arrival time.     HUMA Hidalgo, JASONSW  Weekend Covering   [facility]  [phone]  Searchable on [employer]: 7C Med Surg 7401 thru 2718 , 7C Med Surg 6070 thru 6913

## 2025-06-08 ENCOUNTER — APPOINTMENT (OUTPATIENT)
Dept: OCCUPATIONAL THERAPY | Facility: CLINIC | Age: 75
DRG: 329 | End: 2025-06-08
Payer: MEDICARE

## 2025-06-08 ENCOUNTER — APPOINTMENT (OUTPATIENT)
Dept: PHYSICAL THERAPY | Facility: CLINIC | Age: 75
DRG: 329 | End: 2025-06-08
Payer: MEDICARE

## 2025-06-08 LAB
ANION GAP SERPL CALCULATED.3IONS-SCNC: 9 MMOL/L (ref 7–15)
BACTERIA SPEC CULT: NO GROWTH
BACTERIA SPEC CULT: NO GROWTH
BUN SERPL-MCNC: 41.1 MG/DL (ref 8–23)
CALCIUM SERPL-MCNC: 8.1 MG/DL (ref 8.8–10.4)
CHLORIDE SERPL-SCNC: 105 MMOL/L (ref 98–107)
CREAT SERPL-MCNC: 1.61 MG/DL (ref 0.51–0.95)
CRP SERPL-MCNC: 34 MG/L
EGFRCR SERPLBLD CKD-EPI 2021: 33 ML/MIN/1.73M2
ERYTHROCYTE [DISTWIDTH] IN BLOOD BY AUTOMATED COUNT: 17.2 % (ref 10–15)
GLUCOSE BLDC GLUCOMTR-MCNC: 189 MG/DL (ref 70–99)
GLUCOSE BLDC GLUCOMTR-MCNC: 193 MG/DL (ref 70–99)
GLUCOSE BLDC GLUCOMTR-MCNC: 221 MG/DL (ref 70–99)
GLUCOSE BLDC GLUCOMTR-MCNC: 224 MG/DL (ref 70–99)
GLUCOSE BLDC GLUCOMTR-MCNC: 229 MG/DL (ref 70–99)
GLUCOSE BLDC GLUCOMTR-MCNC: 235 MG/DL (ref 70–99)
GLUCOSE SERPL-MCNC: 224 MG/DL (ref 70–99)
HCO3 SERPL-SCNC: 25 MMOL/L (ref 22–29)
HCT VFR BLD AUTO: 24.7 % (ref 35–47)
HGB BLD-MCNC: 7.7 G/DL (ref 11.7–15.7)
MAGNESIUM SERPL-MCNC: 2.3 MG/DL (ref 1.7–2.3)
MCH RBC QN AUTO: 28 PG (ref 26.5–33)
MCHC RBC AUTO-ENTMCNC: 31.2 G/DL (ref 31.5–36.5)
MCV RBC AUTO: 90 FL (ref 78–100)
PHOSPHATE SERPL-MCNC: 3.1 MG/DL (ref 2.5–4.5)
PLATELET # BLD AUTO: 278 10E3/UL (ref 150–450)
POTASSIUM SERPL-SCNC: 4.4 MMOL/L (ref 3.4–5.3)
RBC # BLD AUTO: 2.75 10E6/UL (ref 3.8–5.2)
SODIUM SERPL-SCNC: 139 MMOL/L (ref 135–145)
WBC # BLD AUTO: 13.7 10E3/UL (ref 4–11)

## 2025-06-08 PROCEDURE — 97535 SELF CARE MNGMENT TRAINING: CPT | Mod: GO

## 2025-06-08 PROCEDURE — 86140 C-REACTIVE PROTEIN: CPT

## 2025-06-08 PROCEDURE — 85027 COMPLETE CBC AUTOMATED: CPT | Performed by: PHYSICIAN ASSISTANT

## 2025-06-08 PROCEDURE — 250N000013 HC RX MED GY IP 250 OP 250 PS 637: Performed by: PHYSICIAN ASSISTANT

## 2025-06-08 PROCEDURE — 36415 COLL VENOUS BLD VENIPUNCTURE: CPT

## 2025-06-08 PROCEDURE — 250N000011 HC RX IP 250 OP 636

## 2025-06-08 PROCEDURE — 250N000013 HC RX MED GY IP 250 OP 250 PS 637: Performed by: SURGERY

## 2025-06-08 PROCEDURE — 80048 BASIC METABOLIC PNL TOTAL CA: CPT

## 2025-06-08 PROCEDURE — 84100 ASSAY OF PHOSPHORUS: CPT

## 2025-06-08 PROCEDURE — 250N000012 HC RX MED GY IP 250 OP 636 PS 637: Performed by: NURSE PRACTITIONER

## 2025-06-08 PROCEDURE — 97530 THERAPEUTIC ACTIVITIES: CPT | Mod: GP

## 2025-06-08 PROCEDURE — 250N000011 HC RX IP 250 OP 636: Performed by: STUDENT IN AN ORGANIZED HEALTH CARE EDUCATION/TRAINING PROGRAM

## 2025-06-08 PROCEDURE — 250N000013 HC RX MED GY IP 250 OP 250 PS 637: Performed by: NURSE PRACTITIONER

## 2025-06-08 PROCEDURE — 99232 SBSQ HOSP IP/OBS MODERATE 35: CPT | Mod: GC | Performed by: STUDENT IN AN ORGANIZED HEALTH CARE EDUCATION/TRAINING PROGRAM

## 2025-06-08 PROCEDURE — 250N000013 HC RX MED GY IP 250 OP 250 PS 637

## 2025-06-08 PROCEDURE — 120N000002 HC R&B MED SURG/OB UMMC

## 2025-06-08 PROCEDURE — 83735 ASSAY OF MAGNESIUM: CPT

## 2025-06-08 PROCEDURE — 250N000011 HC RX IP 250 OP 636: Mod: JZ

## 2025-06-08 RX ORDER — HYDROCORTISONE SODIUM SUCCINATE 100 MG/2ML
12.5 INJECTION INTRAMUSCULAR; INTRAVENOUS EVERY 8 HOURS
Status: DISCONTINUED | OUTPATIENT
Start: 2025-06-08 | End: 2025-06-09

## 2025-06-08 RX ORDER — HYDRALAZINE HYDROCHLORIDE 20 MG/ML
5 INJECTION INTRAMUSCULAR; INTRAVENOUS
Status: DISCONTINUED | OUTPATIENT
Start: 2025-06-08 | End: 2025-06-12 | Stop reason: HOSPADM

## 2025-06-08 RX ADMIN — CEFTOLOZANE AND TAZOBACTAM 1.5 G: 1; .5 INJECTION, POWDER, LYOPHILIZED, FOR SOLUTION INTRAVENOUS at 13:45

## 2025-06-08 RX ADMIN — URSODIOL 250 MG: 250 TABLET ORAL at 23:10

## 2025-06-08 RX ADMIN — LEVOTHYROXINE SODIUM 175 MCG: 0.17 TABLET ORAL at 08:21

## 2025-06-08 RX ADMIN — Medication 20000 UNITS: at 08:23

## 2025-06-08 RX ADMIN — HYDROCORTISONE SODIUM SUCCINATE 12.5 MG: 100 INJECTION, POWDER, FOR SOLUTION INTRAMUSCULAR; INTRAVENOUS at 03:55

## 2025-06-08 RX ADMIN — EZETIMIBE 10 MG: 10 TABLET ORAL at 23:10

## 2025-06-08 RX ADMIN — THERA TABS 1 TABLET: TAB at 08:22

## 2025-06-08 RX ADMIN — SENNOSIDES 2 TABLET: 8.6 TABLET, FILM COATED ORAL at 08:22

## 2025-06-08 RX ADMIN — OMEGA-3-ACID ETHYL ESTERS 1 G: 1 CAPSULE, LIQUID FILLED ORAL at 08:47

## 2025-06-08 RX ADMIN — Medication 2 MG: at 08:47

## 2025-06-08 RX ADMIN — CEFTOLOZANE AND TAZOBACTAM 1.5 G: 1; .5 INJECTION, POWDER, LYOPHILIZED, FOR SOLUTION INTRAVENOUS at 23:12

## 2025-06-08 RX ADMIN — LINEZOLID 600 MG: 600 TABLET, FILM COATED ORAL at 23:11

## 2025-06-08 RX ADMIN — CEFTOLOZANE AND TAZOBACTAM 1.5 G: 1; .5 INJECTION, POWDER, LYOPHILIZED, FOR SOLUTION INTRAVENOUS at 04:54

## 2025-06-08 RX ADMIN — Medication 2 MG: at 17:48

## 2025-06-08 RX ADMIN — URSODIOL 250 MG: 250 TABLET ORAL at 08:23

## 2025-06-08 RX ADMIN — NYSTATIN 1000000 UNITS: 100000 SUSPENSION ORAL at 08:25

## 2025-06-08 RX ADMIN — APIXABAN 2.5 MG: 2.5 TABLET, FILM COATED ORAL at 23:11

## 2025-06-08 RX ADMIN — HYDROCORTISONE SODIUM SUCCINATE 12.5 MG: 100 INJECTION, POWDER, FOR SOLUTION INTRAMUSCULAR; INTRAVENOUS at 17:48

## 2025-06-08 RX ADMIN — HYDROCORTISONE SODIUM SUCCINATE 12.5 MG: 100 INJECTION, POWDER, FOR SOLUTION INTRAMUSCULAR; INTRAVENOUS at 09:51

## 2025-06-08 RX ADMIN — APIXABAN 2.5 MG: 2.5 TABLET, FILM COATED ORAL at 08:23

## 2025-06-08 RX ADMIN — SENNOSIDES 2 TABLET: 8.6 TABLET, FILM COATED ORAL at 23:11

## 2025-06-08 RX ADMIN — Medication 1000 MCG: at 23:10

## 2025-06-08 RX ADMIN — CALCITRIOL CAPSULES 0.25 MCG 0.25 MCG: 0.25 CAPSULE ORAL at 08:23

## 2025-06-08 RX ADMIN — SERTRALINE HYDROCHLORIDE 50 MG: 50 TABLET ORAL at 08:23

## 2025-06-08 RX ADMIN — LINEZOLID 600 MG: 600 TABLET, FILM COATED ORAL at 08:23

## 2025-06-08 RX ADMIN — OMEGA-3-ACID ETHYL ESTERS 1 G: 1 CAPSULE, LIQUID FILLED ORAL at 23:10

## 2025-06-08 RX ADMIN — NYSTATIN 1000000 UNITS: 100000 SUSPENSION ORAL at 13:44

## 2025-06-08 RX ADMIN — GABAPENTIN 300 MG: 250 SOLUTION ORAL at 23:08

## 2025-06-08 ASSESSMENT — ACTIVITIES OF DAILY LIVING (ADL)
ADLS_ACUITY_SCORE: 56
ADLS_ACUITY_SCORE: 64
ADLS_ACUITY_SCORE: 64
ADLS_ACUITY_SCORE: 60
ADLS_ACUITY_SCORE: 60
ADLS_ACUITY_SCORE: 64
ADLS_ACUITY_SCORE: 60
ADLS_ACUITY_SCORE: 64
ADLS_ACUITY_SCORE: 56
ADLS_ACUITY_SCORE: 60
ADLS_ACUITY_SCORE: 64
ADLS_ACUITY_SCORE: 60
ADLS_ACUITY_SCORE: 60
ADLS_ACUITY_SCORE: 56
ADLS_ACUITY_SCORE: 64
ADLS_ACUITY_SCORE: 56
ADLS_ACUITY_SCORE: 60
ADLS_ACUITY_SCORE: 56
ADLS_ACUITY_SCORE: 60

## 2025-06-08 NOTE — PROGRESS NOTES
[facility]    Progress Note - Colorectal Surgery Service       Date of Admission:  5/28/2025    Assessment & Plan: Surgery   Mrs. Thompson is a 75 year old female with a complex PMH, some of which includes CKD stage 3, CVA and DVT on anticoag, atrial fibrillation, DM2, biliary cirrhosis s/p prior liver transplant admitted for smoldering diverticulitis with 4.1cm abscess on prolonged IV abx as an outpatient.  Was admitted on 5/29 with worsening abdominal pain, hypotension due to gram negative septicemia, diarrhea.  Now s/p exploratory laparotomy, sigmoidectomy and end colostomy on 5/30/25, recovering appropriately with return of bowel function.    Polymicrobial bacteremia likely 2/2 GI source. Having return of bowel function. CT A/P on 6/6 without intra-abd fluid collections nor acute intra-abd pathology.     - Reg diet.  Recommend protein nutritional supplements as able.    - restarted eliquis on 6/4  - Appreciate ID recommendations (now on Zebraxa as well as Lineazolid)  - Hx of liver transplant - immunosuppression per transplant immunology team (changed from sirolimus to tac).  On IV hydrocort taper for stress dosing.   - High dose Vitamin A x30 days post-op for wound healing support in setting of immunosuppression  - Getting senokot BID  - Continue WOC ostomy cares   - Continue PATY drain   - Encourage out of bed and ambulation, incentive spirometer use      Diet: Snacks/Supplements Adult: Other; PRN per pt request; With Meals  Snacks/Supplements Adult: Other; Offer pt Gelatein Plus or 20 TID with meals - please ask what flavor she prefers with each meal; With Meals  Room Service  Advance Diet as Tolerated: Regular Diet Adult; Regular Diet Adult  Snacks/Supplements Adult: Glucerna; Between Meals    DVT Prophylaxis: SQH  Ron Catheter: Not present  Lines: None       Drains: PRESENT         - 1 Closed/Suction Drain(s)    - 1 Ureteral Drain/Stent(s)   Cardiac  "Monitoring: ACTIVE order. Indication: Tachyarrhythmias, acute (48 hours)  Code Status: Full Code      Clinically Significant Risk Factors          # Hyperchloremia: Highest Cl = 108 mmol/L in last 2 days, will monitor as appropriate          # Hypoalbuminemia: Lowest albumin = 2.4 g/dL at 5/29/2025  3:15 PM, will monitor as appropriate       # Hypertension: Noted on problem list            # Overweight: Estimated body mass index is 28.14 kg/m  as calculated from the following:    Height as of this encounter: 1.702 m (5' 7\").    Weight as of this encounter: 81.5 kg (179 lb 10.8 oz).        # Financial/Environmental Concerns: none         Social Drivers of Health   Tobacco Use: Medium Risk (5/27/2025)    Patient History     Smoking Tobacco Use: Former     Smokeless Tobacco Use: Never   Physical Activity: Insufficiently Active (11/9/2023)    Received from [facility]    Exercise Vital Sign     Days of Exercise per Week: 5 days     Minutes of Exercise per Session: 10 min         Disposition Plan     Pending tolerance of advancement of diet and return of bowel function      Patient was seen and discussed with staff.     **For on call schedules and contact information, please refer to [facility]**      Non-urgent messages: Securely message with [employer] (more info)  Text page via [facility] Paging/Directory     ______________________________________________________________________    Interval History   No acute events overnight. Had an episode of nausea during lunch yesterday. Denies feeling nauseous currently. Denies having abd pain.     Physical Exam   Vital Signs: Temp: 97.5  F (36.4  C) Temp src: Axillary BP: (!) 155/115 Pulse: 80   Resp: 17 SpO2: 98 % O2 Device: Nasal cannula Oxygen Delivery: 1 LPM  Weight: 179 lbs 10.8 oz    Intake/Output Summary (Last 24 hours) at 6/8/2025 0820  Last data filed at 6/8/2025 0545  Gross per 24 hour   Intake 1565 ml   Output 1400 ml   Net 165 ml      General Appearance: Alert and oriented, no " acute distress  Respiratory: no increased work of breathing   Cardiovascular: regular rate   GI: soft, nondistended, non-tender to palpation around ostomy in LLQ, ostomy pink and well perfused with gas as well as thickened stool, incision clean/dry/intact with staples, PATY with cloudy output  Skin: no rashes    Data     I have personally reviewed the following data over the past 24 hrs:    13.7 (H)  \   7.7 (L)   / 278     139 105 41.1 (H) /  224 (H)   4.4 25 1.61 (H) \     Procal: N/A CRP: 34.00 (H) Lactic Acid: N/A         Imaging results reviewed over the past 24 hrs:   No results found for this or any previous visit (from the past 24 hours).

## 2025-06-08 NOTE — PLAN OF CARE
Goal Outcome Evaluation:   [id number]       Plan of Care Reviewed With: patient    Overall Patient Progress: no changeOverall Patient Progress: no change    Outcome Evaluation: Patient is A&O x4 patient was on room air for day today and did great just on 1L for bedtime patient states has a sleep apnea appointment in a couple months. Patient used IS for me and encouraged her to use it as often as she can to help with the crackles in left lower lobe and to help prevent pneumonia. Patient emptied her ileostomy bag on her own and did great. Showed patient how to clean bag by instilling water and rinsing patient did well. PATY drain emptied 20ml serous liquid out my 8 hr shift, drain dressing changed did note some erythema at insertion site with some purulent drainage. Use wound cleanser to clean midline incision, remains open to air. 350 ml urine out my 8hr shift. Patient has drank 1 and 1/2 glucerna's my shift. Bed alarm is on, call light and belongings are within reach. Will continue to monitor. Patient received 1 unit PRBC they not reactions noted. Contacted doctor who stated ok to get hemoglobin redraw in Am

## 2025-06-08 NOTE — PLAN OF CARE
Problem: Adult Inpatient Plan of Care  Goal: Plan of Care Review  Description: The Plan of Care Review/Shift note should be completed every shift.  The Outcome Evaluation is a brief statement about your assessment that the patient is improving, declining, or no change.  This information will be displayed automatically on your shift  note.  Recent Flowsheet Documentation  Taken 6/8/2025 2170 by Aleja Velásquez RN  Plan of Care Reviewed With: patient  Overall Patient Progress: improving   Goal Outcome Evaluation:      Plan of Care Reviewed With: patient    Overall Patient Progress: improvingOverall Patient Progress: improving    [id number]:  Assumed care at 07-15:30    Patient A&OX 4, [place] and using hearing aid, denies pain no respiratory distress noted using oxygen while asleep but on room air while awake. Generalized bruising both upper arm with dressing R-mepilex and L-gauze with tegaderm. PATY (changed) and Colostomy intact, the Pure wick in place with adequate urine output.  With good appetite BS checks TID.  Has dentures on, her Hgb 7.7 up in the chair with PT assist X2 pivot and went back to be after two hours and used ceiling lift this time.  Call light within reach, Patient able to make needs known. Continue with POC and update MD with change.            none

## 2025-06-08 NOTE — PLAN OF CARE
Goal Outcome Evaluation:      Plan of Care Reviewed With: patient    Overall Patient Progress: no change    Outcome Evaluation: 11p - 7a. Alert and oriented x 4. Denies pain. Hard of hearing, uses Hearing Aids. On continuous tele monitor, on Afib. On continuous pulse oximetry monitoring, 02 sats in the mid 90's overnight on 02 at 1 lpm NC. Abdominal incision with staples in place, no drainage noted. Abdominal drain to bulb suction had milky pink tinged output. Colostomy bag intact with loose stool. Pt refused emptying of colostomy bag this am. Hypertensive, did not meet parameter for prn med. BG monitored, 224 at 2 am. Incontinent of urine, purewick in place .Will continue plan of care

## 2025-06-08 NOTE — PROVIDER NOTIFICATION
Provider Surekha Moncada informed of elevated /97 and 172/95, with no other symptoms. Tele reads A fib. PRN Hydralazine ordered

## 2025-06-08 NOTE — PROGRESS NOTES
[facility]    Progress Note - Medicine Service, JOVANNY TEAM 3       Date of Admission:  5/28/2025    Assessment & Plan   Luz Thompson is a 75 year old female who was admitted on 5/28/2025 with past medical history of atrial fibrillation, DVT on eliquis, CVA, CKD III, HFpEF (TTE 3/9/25 EF 60-65%), T2DM, biliary cirrhosis s/p liver transplant in 2002, EBV, HTN, HLD, Sjogren's syndrome, Basal Cell Carincoma s/p MOHS on 4/8/25, thyroid cancer s/p thyroidectomy in 2010, Sjogren's syndrome, and diverticulitis with recent admission from 4/14-4/23 for ESBL UTI & E. Faecalis bacteremia related to sigmoid microperforation (treated with IV ertapenem) who is admitted for abdominal pain and hypotension, found to have smoldering diverticulitis with 4.1cm abscess s/p open sigmoidectomy and end colostomy on 5/30/25, with Psuedomonas positive blood cultures drawn 5/28.  Ongoing IV antibiotics due to bacteremia (GPC and VRE) and weaning off stress dose steroids.     Today:  -Space steroids. Hydrocortisone 12.5mg [id number]  -Approaching discharge, will need to monitor for a day off of IV steroids before going out     # Diverticulitis c/b abscess s/p open sigmoidectomy with end colostomy 5/30/25  # Sepsis 2/2 Klebsiella pneumoniae and Pseudomonas aeruginosa bacteremia 5/28, likely of intra-abdominal origin   # Hypotension 2/2 sepsis  Patient now hypertensive, after coming off pressors on 5/30. Currently with ongoing IV steroid taper, will plan to stop continuous maintenance fluids. Will hold PTA antihypertensives while titrating and resume when able.  Transitioned from meropenem to ceftolozane-tazobactam due to resistance (psuedomonas coverage) and switch vancomycin to linezolid iso DERREK for coverage of previous VRE E Facecium.  From ID perspective, there is no good oral antibiotic options and patient will complete 7-day IV antibiotic course at the hospital (first day of blood  culture clearance 6/3). New cloudy PATY drain output on 6/6. Ongoing leukocytosis but no new localizing symptoms so will continue to monitor.   -CT A/P negative abscess/fluid collection   - CRS consulted              - ADAT  - subcutaneous heparin for DVT ppx, continue to hold eliquis for now  - Hx of liver transplant - immunosuppression per transplant immunology team   - High dose Vitamin A x30 days post-op for wound healing support in setting of immunosuppression  - senokot BID  - Continue WOC ostomy cares   - Continue PATY drain   - Encourage out of bed and ambulation, incentive spirometer use   - Transplant ID following  - meropenem (5/31- 6/3) for pseudomonas coverage  - linezolid (6/1 -6/3)  for enterococcal bacteremia  - Ceftolozane-Tazobactam (6/3-6/9), for a 7-day course (Bcx clear from 6/3)   - Pain regimen:              - Scheduled: Robaxin QID, tylenol 975mg BID, gabapentin 300mg QHS, Lidocaine patches daily, Diclofenac QID  -  PRN: PO oxycodone 5mg  - Blood cultures drawn 5/28 positive for Klebsiella and Pseudomonas  - Bcx (5/31): E Faecium VRE  - Bcx (6/2): GPC in pairs and chains   - Bcx (6/3): NGTD   - Space Hydrocortisone 12.5mg g5leqod  - If decreasing back to IS dosing, would pause on pred 10 until tac level therapeutic (goal 3-5)   - hold PTA amlodipine while tapering fluids and IV steroids    # Acute blood loss anemia    # Anemia of critical illness  # coagulopathy due to cirrhosis and surgical blood loss    # thrombocytopenia   # anemia of critical illness   No overt bleeding and ostomy output/vitals stable.   - Transfuse if hgb <7.0 or signs/symptoms of hypoperfusion. Monitor and trend.   - PTA Ferous sulfate, folic acid  - Transfused 1 unit pRBC (6/7)     #C/f TIA   In the morning of 6/4, patient states that she has had speech problems in the last couple of days since the surgery.  She describes that she has trouble getting her words out and that her speech does not feel as fluent as it was  previously.  She denies any new numbness or tingling.  No headache or changes in vision.  Nonfocal on exam.  CT head negative for intracranial bleed or other acute process.  TTE with bubble also negative.  Certainly possible that patient may have had a TIA following surgery as anticoagulation had been held.  Will proceed with MRI brain negative for acute infarct.    - Continue PTA Xarelto     # DERREK on CKD3, resolved   # Bilateral stent placement per urology 5/29  # ATN  DERREK in the setting of hypotension, shock, nephrotoxic meds which is now resolved after IVF and improved PO after surgery.   - Baseline creatinine 1.3-1.6  - external catheter   - PTA estradiol vaginal  cream     # Primary biliary cirrhosis s/p liver transplant 2002  # EBV +  # Reactive Leukocytosis   # Sjogren's syndrome  - Transplant Hepatology consulted  - No sirolimus  - Resumed PTA Prednisone 9 mg PO   - Tac 1 mg po BID per transplant hep              - ordered suspension formulation instead of capsule due to gelatin allergy  - CMP in AM  - PTA Nystatin QID  - PTA Ursodiol      # Acute post-op pain  # high risk delirium   - delirium precaution   - Monitor neurological status. Delirium preventions and precautions.   - pain regimen as above  - PTA sertraline  - melatonin Q HS  - PRN narcan     # Protein calorie deficit malnutrition    - Nutrition consulted  - Supplements: Gelatin Plus TID with meals + PRN per patient request  - Thera-Vit 1 tablet daily   - Vitamin A level (ordered)     # Post operative ventilatory support, resolved  # Acute hypoxic respiratory support   Currently on 1L NC.  - Supplemental oxygen to keep saturation above 92 %.  - PRN albuterol      # Paroxysmal A-fib on apixaban   # Hypertension  # HFpEF (TTE 3/9/25 EF 60-65%)   # history of CVA  # Vasoplagia  - Monitor hemodynamic status.  - Levophed off since 0400 5/31  - MAP goal >65  - PTA ezetimibe  - holding PTA amlodipine 5mg BID  - Continue PTA apixaban 2.5mg BID  - Holding  "metoprolol tartrate 12.5 mg BID  - holding hydralazine, when reordering would prefer 2 25 mg tabs   - SBP >100 or MAP >65  - weaning steroids as above      # Type 2 diabetes  # Stress hyperglycemia    - MDSSI  - Goal to keep BG< 180 for optimal wound healing   - Glucose AC/HS  - hypoglycemic protocol  - On prednisone, transitioned from hydrocortisone      # Thyroid cancer status post thyroidectomy 2010  - PTA calcitriol 0.25mcg  - PTA levothyroxine 175mcg     # Weakness and deconditioning of critical illness   - Physical and occupational therapy consult   - up with assist, fall precaution      # Basal cell carcinoma status post Mohs April 2025            Diet: Snacks/Supplements Adult: Other; PRN per pt request; With Meals  Snacks/Supplements Adult: Other; Offer pt Gelatein Plus or 20 TID with meals - please ask what flavor she prefers with each meal; With Meals  Room Service  Advance Diet as Tolerated: Regular Diet Adult; Regular Diet Adult  Snacks/Supplements Adult: Glucerna; Between Meals    DVT Prophylaxis:Eliquis  Ron Catheter: Not present  Fluids: PO  Lines: None     Cardiac Monitoring: ACTIVE order. Indication: Tachyarrhythmias, acute (48 hours)  Code Status: Full Code      Clinically Significant Risk Factors          # Hyperchloremia: Highest Cl = 108 mmol/L in last 2 days, will monitor as appropriate          # Hypoalbuminemia: Lowest albumin = 2.4 g/dL at 5/29/2025  3:15 PM, will monitor as appropriate     # Hypertension: Noted on problem list            # Overweight: Estimated body mass index is 28.14 kg/m  as calculated from the following:    Height as of this encounter: 1.702 m (5' 7\").    Weight as of this encounter: 81.5 kg (179 lb 10.8 oz).      # Financial/Environmental Concerns: none          Disposition Plan     Medically Ready for Discharge: Anticipated in 2-4 Days         The patient's care was discussed with the Attending Physician, Dr. Russo and Patient.    Lisa Ferreira MD  Medicine " Service, JOVANNY TEAM 3  [facility]  Securely message with [employer] (more info)  Text page via [facility] Paging/Directory   See signed in provider for up to date coverage information  ______________________________________________________________________    Interval History   Overnight had high systolic BP of 170, ordered PRN hydralazine but was not given. Asymptomatic. Asked if she could get extra transfusions to get to a hbg of 10, we explained that we will only transfuse to get her to a 7. She understands.     Physical Exam   Vital Signs: Temp: 97.6  F (36.4  C) Temp src: Oral BP: (!) 154/83 Pulse: 75   Resp: 16 SpO2: 98 % O2 Device: Nasal cannula Oxygen Delivery: 1 LPM  Weight: 179 lbs 10.8 oz    Gen: Comfortable appearing older woman lying in chair by the bed in no distress  HEENT: Atraumatic, nonicteric sclera  CV: RRR, limbs well perfused. No accessory heart sounds.   Pulm: Clear to auscultation in all fields, no wheezing or crackles  Abd: Nontender, nondistended abdomen. Ostomy output stable, no change in characteristic. PATY drain in place, serous output.   Skin: No lesions on exposed skin  Neuro: Appears alert and is appropriately conversational, moving all limbs spontaneously  Psych: Appropriate to the situation      Medical Decision Making       Please see A&P for additional details of medical decision making.      Data     I have personally reviewed the following data over the past 24 hrs:    13.7 (H)  \   7.7 (L)   / 278     139 105 41.1 (H) /  193 (H)   4.4 25 1.61 (H) \     Procal: N/A CRP: 34.00 (H) Lactic Acid: N/A         Imaging results reviewed over the past 24 hrs:   No results found for this or any previous visit (from the past 24 hours).

## 2025-06-09 ENCOUNTER — APPOINTMENT (OUTPATIENT)
Dept: OCCUPATIONAL THERAPY | Facility: CLINIC | Age: 75
DRG: 329 | End: 2025-06-09
Payer: MEDICARE

## 2025-06-09 ENCOUNTER — APPOINTMENT (OUTPATIENT)
Dept: PHYSICAL THERAPY | Facility: CLINIC | Age: 75
DRG: 329 | End: 2025-06-09
Payer: MEDICARE

## 2025-06-09 LAB
ANION GAP SERPL CALCULATED.3IONS-SCNC: 9 MMOL/L (ref 7–15)
BACTERIA SPEC CULT: ABNORMAL
BACTERIA SPEC CULT: ABNORMAL
BASOPHILS # BLD AUTO: 0 10E3/UL (ref 0–0.2)
BASOPHILS NFR BLD AUTO: 0 %
BUN SERPL-MCNC: 44.7 MG/DL (ref 8–23)
CALCIUM SERPL-MCNC: 8.2 MG/DL (ref 8.8–10.4)
CHLORIDE SERPL-SCNC: 107 MMOL/L (ref 98–107)
CREAT SERPL-MCNC: 1.64 MG/DL (ref 0.51–0.95)
EGFRCR SERPLBLD CKD-EPI 2021: 32 ML/MIN/1.73M2
EOSINOPHIL # BLD AUTO: 0 10E3/UL (ref 0–0.7)
EOSINOPHIL NFR BLD AUTO: 0 %
ERYTHROCYTE [DISTWIDTH] IN BLOOD BY AUTOMATED COUNT: 17.1 % (ref 10–15)
ERYTHROCYTE [DISTWIDTH] IN BLOOD BY AUTOMATED COUNT: 17.2 % (ref 10–15)
GLUCOSE BLDC GLUCOMTR-MCNC: 143 MG/DL (ref 70–99)
GLUCOSE BLDC GLUCOMTR-MCNC: 186 MG/DL (ref 70–99)
GLUCOSE BLDC GLUCOMTR-MCNC: 191 MG/DL (ref 70–99)
GLUCOSE BLDC GLUCOMTR-MCNC: 236 MG/DL (ref 70–99)
GLUCOSE BLDC GLUCOMTR-MCNC: 238 MG/DL (ref 70–99)
GLUCOSE SERPL-MCNC: 200 MG/DL (ref 70–99)
HCO3 SERPL-SCNC: 24 MMOL/L (ref 22–29)
HCT VFR BLD AUTO: 24.8 % (ref 35–47)
HCT VFR BLD AUTO: 26.4 % (ref 35–47)
HGB BLD-MCNC: 7.8 G/DL (ref 11.7–15.7)
HGB BLD-MCNC: 8.3 G/DL (ref 11.7–15.7)
IMM GRANULOCYTES # BLD: 0.3 10E3/UL
IMM GRANULOCYTES NFR BLD: 3 %
LYMPHOCYTES # BLD AUTO: 0.6 10E3/UL (ref 0.8–5.3)
LYMPHOCYTES NFR BLD AUTO: 5 %
MAGNESIUM SERPL-MCNC: 2.3 MG/DL (ref 1.7–2.3)
MCH RBC QN AUTO: 28.6 PG (ref 26.5–33)
MCH RBC QN AUTO: 28.7 PG (ref 26.5–33)
MCHC RBC AUTO-ENTMCNC: 31.4 G/DL (ref 31.5–36.5)
MCHC RBC AUTO-ENTMCNC: 31.5 G/DL (ref 31.5–36.5)
MCV RBC AUTO: 91 FL (ref 78–100)
MCV RBC AUTO: 91 FL (ref 78–100)
MONOCYTES # BLD AUTO: 0.5 10E3/UL (ref 0–1.3)
MONOCYTES NFR BLD AUTO: 4 %
NEUTROPHILS # BLD AUTO: 9.8 10E3/UL (ref 1.6–8.3)
NEUTROPHILS NFR BLD AUTO: 88 %
NRBC # BLD AUTO: 0.1 10E3/UL
NRBC BLD AUTO-RTO: 1 /100
PHOSPHATE SERPL-MCNC: 2.9 MG/DL (ref 2.5–4.5)
PLATELET # BLD AUTO: 281 10E3/UL (ref 150–450)
PLATELET # BLD AUTO: 281 10E3/UL (ref 150–450)
POTASSIUM SERPL-SCNC: 4.5 MMOL/L (ref 3.4–5.3)
RBC # BLD AUTO: 2.73 10E6/UL (ref 3.8–5.2)
RBC # BLD AUTO: 2.89 10E6/UL (ref 3.8–5.2)
SODIUM SERPL-SCNC: 140 MMOL/L (ref 135–145)
TACROLIMUS BLD-MCNC: 2 UG/L (ref 5–15)
TME LAST DOSE: ABNORMAL H
TME LAST DOSE: ABNORMAL H
WBC # BLD AUTO: 11.2 10E3/UL (ref 4–11)
WBC # BLD AUTO: 11.4 10E3/UL (ref 4–11)

## 2025-06-09 PROCEDURE — 85014 HEMATOCRIT: CPT | Performed by: PHYSICIAN ASSISTANT

## 2025-06-09 PROCEDURE — 120N000002 HC R&B MED SURG/OB UMMC

## 2025-06-09 PROCEDURE — 36415 COLL VENOUS BLD VENIPUNCTURE: CPT | Performed by: NURSE PRACTITIONER

## 2025-06-09 PROCEDURE — 36415 COLL VENOUS BLD VENIPUNCTURE: CPT

## 2025-06-09 PROCEDURE — 80048 BASIC METABOLIC PNL TOTAL CA: CPT

## 2025-06-09 PROCEDURE — 83735 ASSAY OF MAGNESIUM: CPT | Performed by: STUDENT IN AN ORGANIZED HEALTH CARE EDUCATION/TRAINING PROGRAM

## 2025-06-09 PROCEDURE — 250N000011 HC RX IP 250 OP 636: Performed by: STUDENT IN AN ORGANIZED HEALTH CARE EDUCATION/TRAINING PROGRAM

## 2025-06-09 PROCEDURE — 250N000013 HC RX MED GY IP 250 OP 250 PS 637: Performed by: PHYSICIAN ASSISTANT

## 2025-06-09 PROCEDURE — 99233 SBSQ HOSP IP/OBS HIGH 50: CPT | Mod: 24 | Performed by: INTERNAL MEDICINE

## 2025-06-09 PROCEDURE — 250N000013 HC RX MED GY IP 250 OP 250 PS 637

## 2025-06-09 PROCEDURE — 250N000012 HC RX MED GY IP 250 OP 636 PS 637: Performed by: NURSE PRACTITIONER

## 2025-06-09 PROCEDURE — 250N000011 HC RX IP 250 OP 636

## 2025-06-09 PROCEDURE — 250N000013 HC RX MED GY IP 250 OP 250 PS 637: Performed by: SURGERY

## 2025-06-09 PROCEDURE — 97530 THERAPEUTIC ACTIVITIES: CPT | Mod: GP | Performed by: REHABILITATION PRACTITIONER

## 2025-06-09 PROCEDURE — 84100 ASSAY OF PHOSPHORUS: CPT | Performed by: STUDENT IN AN ORGANIZED HEALTH CARE EDUCATION/TRAINING PROGRAM

## 2025-06-09 PROCEDURE — 99232 SBSQ HOSP IP/OBS MODERATE 35: CPT | Mod: GC | Performed by: STUDENT IN AN ORGANIZED HEALTH CARE EDUCATION/TRAINING PROGRAM

## 2025-06-09 PROCEDURE — 85004 AUTOMATED DIFF WBC COUNT: CPT

## 2025-06-09 PROCEDURE — 97535 SELF CARE MNGMENT TRAINING: CPT | Mod: GO

## 2025-06-09 PROCEDURE — G0463 HOSPITAL OUTPT CLINIC VISIT: HCPCS

## 2025-06-09 PROCEDURE — 250N000013 HC RX MED GY IP 250 OP 250 PS 637: Performed by: NURSE PRACTITIONER

## 2025-06-09 PROCEDURE — 80197 ASSAY OF TACROLIMUS: CPT | Performed by: NURSE PRACTITIONER

## 2025-06-09 PROCEDURE — 97110 THERAPEUTIC EXERCISES: CPT | Mod: GP | Performed by: REHABILITATION PRACTITIONER

## 2025-06-09 PROCEDURE — G0545 PR INHRENT VISIT TO INPT/OBS W CNFRM/SUSPCT INFCT DIS BY INFCT DIS SPCIALST: HCPCS | Performed by: INTERNAL MEDICINE

## 2025-06-09 PROCEDURE — 250N000011 HC RX IP 250 OP 636: Mod: JZ

## 2025-06-09 RX ORDER — HYDROCORTISONE SODIUM SUCCINATE 100 MG/2ML
12.5 INJECTION INTRAMUSCULAR; INTRAVENOUS EVERY 12 HOURS
Status: DISCONTINUED | OUTPATIENT
Start: 2025-06-09 | End: 2025-06-10

## 2025-06-09 RX ADMIN — NYSTATIN 1000000 UNITS: 100000 SUSPENSION ORAL at 08:42

## 2025-06-09 RX ADMIN — Medication 2 MG: at 08:42

## 2025-06-09 RX ADMIN — APIXABAN 2.5 MG: 2.5 TABLET, FILM COATED ORAL at 08:43

## 2025-06-09 RX ADMIN — NYSTATIN 1000000 UNITS: 100000 SUSPENSION ORAL at 20:09

## 2025-06-09 RX ADMIN — Medication 1000 MCG: at 22:23

## 2025-06-09 RX ADMIN — LEVOTHYROXINE SODIUM 175 MCG: 0.17 TABLET ORAL at 08:44

## 2025-06-09 RX ADMIN — Medication 20000 UNITS: at 08:44

## 2025-06-09 RX ADMIN — OMEGA-3-ACID ETHYL ESTERS 1 G: 1 CAPSULE, LIQUID FILLED ORAL at 08:42

## 2025-06-09 RX ADMIN — THERA TABS 1 TABLET: TAB at 08:43

## 2025-06-09 RX ADMIN — LINEZOLID 600 MG: 600 TABLET, FILM COATED ORAL at 20:07

## 2025-06-09 RX ADMIN — CEFTOLOZANE AND TAZOBACTAM 1.5 G: 1; .5 INJECTION, POWDER, LYOPHILIZED, FOR SOLUTION INTRAVENOUS at 22:23

## 2025-06-09 RX ADMIN — OMEGA-3-ACID ETHYL ESTERS 1 G: 1 CAPSULE, LIQUID FILLED ORAL at 20:09

## 2025-06-09 RX ADMIN — URSODIOL 250 MG: 250 TABLET ORAL at 20:08

## 2025-06-09 RX ADMIN — URSODIOL 250 MG: 250 TABLET ORAL at 08:43

## 2025-06-09 RX ADMIN — NYSTATIN 1000000 UNITS: 100000 SUSPENSION ORAL at 12:52

## 2025-06-09 RX ADMIN — CALCITRIOL CAPSULES 0.25 MCG 0.25 MCG: 0.25 CAPSULE ORAL at 08:44

## 2025-06-09 RX ADMIN — HYDROCORTISONE SODIUM SUCCINATE 12.5 MG: 100 INJECTION, POWDER, FOR SOLUTION INTRAMUSCULAR; INTRAVENOUS at 08:46

## 2025-06-09 RX ADMIN — HYDROCORTISONE SODIUM SUCCINATE 12.5 MG: 100 INJECTION, POWDER, FOR SOLUTION INTRAMUSCULAR; INTRAVENOUS at 22:22

## 2025-06-09 RX ADMIN — HYDROCORTISONE SODIUM SUCCINATE 12.5 MG: 100 INJECTION, POWDER, FOR SOLUTION INTRAMUSCULAR; INTRAVENOUS at 02:25

## 2025-06-09 RX ADMIN — SERTRALINE HYDROCHLORIDE 50 MG: 50 TABLET ORAL at 08:44

## 2025-06-09 RX ADMIN — NYSTATIN 1000000 UNITS: 100000 SUSPENSION ORAL at 16:38

## 2025-06-09 RX ADMIN — CEFTOLOZANE AND TAZOBACTAM 1.5 G: 1; .5 INJECTION, POWDER, LYOPHILIZED, FOR SOLUTION INTRAVENOUS at 06:43

## 2025-06-09 RX ADMIN — APIXABAN 2.5 MG: 2.5 TABLET, FILM COATED ORAL at 20:08

## 2025-06-09 RX ADMIN — CEFTOLOZANE AND TAZOBACTAM 1.5 G: 1; .5 INJECTION, POWDER, LYOPHILIZED, FOR SOLUTION INTRAVENOUS at 16:36

## 2025-06-09 RX ADMIN — Medication 1 EACH: at 12:56

## 2025-06-09 RX ADMIN — LINEZOLID 600 MG: 600 TABLET, FILM COATED ORAL at 08:43

## 2025-06-09 RX ADMIN — ESTRADIOL 2 G: 0.1 CREAM VAGINAL at 20:14

## 2025-06-09 RX ADMIN — SENNOSIDES 2 TABLET: 8.6 TABLET, FILM COATED ORAL at 08:44

## 2025-06-09 RX ADMIN — EZETIMIBE 10 MG: 10 TABLET ORAL at 22:23

## 2025-06-09 RX ADMIN — Medication 2 MG: at 17:56

## 2025-06-09 ASSESSMENT — ACTIVITIES OF DAILY LIVING (ADL)
ADLS_ACUITY_SCORE: 56

## 2025-06-09 NOTE — PLAN OF CARE
Goal Outcome Evaluation:      Plan of Care Reviewed With: patient    Overall Patient Progress: improving    11 p- 7a.Alert and oriented x 4. Hard of hearing , uses bilateral hearing aids. Denies pain. 02 inhalation at 1 lpm NC overnight . Colostomy bag intact with small output overnight.. PATY drain with milky pink tinged output. BG monitored overnight .  Incontinent of urine, Purewick in place. Hypertensive but did not meet parameter for prn med. Plan is to discharge TCU.

## 2025-06-09 NOTE — PROGRESS NOTES
Transplant Infectious Diseases Inpatient Consultation      Luz Thompson MRN# [id number]   YOB: 1950 Age: 75 year old   Date of Admission and time: 5/28/2025 12:43 PM     Reason for consult: Polymicrobial bacteremia in an immunocompromised post-liver transplant patient, now s/p sigmoidectomy with end colostomy for diverticulitis with abscess.        Recommendations:     1. Initial plan for short course of antimicrobials remains. Today should be the last day of antimicrobial therapy (6/3-6/9).   2. She clinically appears well and if her laboratory parameters remain stable tomorrow, we can discontinue antimicrobial therapy.   3. We reached out to her outpatient ID provider (Dr. Lundberg) and will arrange earlier follow up (currently scheduled for 7/2).     Transplant infectious diseases will continue to follow.    Case discussed with the attending physician, Dr. Isidro.    Jose Ramon Lee MD  Infectious Diseases Fellow        Synopsis of Clinical Presentation and Course   Transplant Summary  Transplants:  5/22/2002 (Liver); POD  [id number].  Coordinator Angelika Frausto  Reason for Transplantation: Liver cirrhosis   Current Immunosuppression:   Prednisone  Tacrolimus  Mycophenolate    Ms. 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 She underwent open sigmoidectomy with end colostomy on 5/30/25. Post-operatively, she developed polymicrobial bacteremia with CR-Pseudomonas aeruginosa, Klebsiella pneumoniae, and VRE faecium. All blood cultures cleared by 6/3/25. She has remained afebrile and clinically stable since.    Over the weekend, her WBC increased to 16 from a baseline of ~11, prompting a CT A/P to rule out intra-abdominal complications. Imaging showed only a small amount of non-inflammatory pelvic fluid. Drains were removed on 6/9. No changes in abdominal exam or new  complaints reported. In this setting, an alternative explanation for leukocytosis should be sought as with source control and extremely broad coverage she is receiving the likelihood of active or new infection is very low. Recommendations remain unchanged with plans to complete 7-day antibiotic course.        Active Problems and Infectious Diseases Issues:     Polymicrobial bacteremia (CR-Pseudomonas aeruginosa, Klebsiella pneumoniae, VRE):    Initial blood cultures on 5/28 grew CR-Pseudomonas and Klebsiella pneumoniae; subsequent cultures on 5/31 grew Enterococcus faecium (vancomycin-resistant). Blood cultures have remained negative since 6/3. The patient has been clinically stable, afebrile, and without signs of persistent bacteremia. She is currently receiving ceftolozane-tazobactam (MARYJANE <=2 for both Pseudomonas and Klebsiella) and oral linezolid (MARYJANE 2 for VRE). Given appropriate source control, lack of endocarditis on TTE, and stable response, we recommend completing a 7-day course (6/3-6/9).    Post-operative status s/p open sigmoidectomy with end colostomy (5/30/25):    Performed for diverticulitis with pericolic abscess. Postoperative recovery has been generally uncomplicated. Colostomy is functional, and the surgical site shows no evidence of infection. A CT A/P on 6/6 was obtained for isolated leukocytosis (WBC 16) and showed only small-volume, non-inflammatory pelvic fluid; no drainable collections or abscesses were identified. Surgical drains are functional (130 mL output yesterday), and no abdominal symptoms are reported.    Immunocompromised host s/p liver transplantation (2002):    On maintenance immunosuppression with tacrolimus. No evidence of acute graft dysfunction. Tacrolimus levels have been stable and she remains off prednisone currently. History of EBV and Sjögren s syndrome may influence susceptibility to infection and medication tolerance.    Elevated WBC (16 on 6/7):    This represents a  new isolated leukocytosis without associated fever, hemodynamic instability, or localizing signs. CT A/P shows no inflammatory source. We attribute this to possible post-operative inflammation or reactive physiology but recommend continued monitoring.    Transplant Checklist  - QTc interval: 379 on 6/3/2025  - Pneumocystis prophylaxis: none   - Bacterial prophylaxis: none   - Fungal prophylaxis: none   - Serostatus & viral prophylaxis: none   - Immunization status: Needs RSV, Prevenar-20, Tdap  - Gamma globulin status: IgG 1110 on 8/5/19        Old Problems and Infectious Diseases Issues:     Old Problems and Infectious Diseases Issues:    ESBL UTI and Enterococcus faecalis bacteremia (April 2025):  Treated with IV ertapenem during 4/14-4/23/2025 admission for presumed sigmoid microperforation. No recurrence since that time.    History of recurrent infections and extensive antibiotic allergies:  Patient has a complex allergy history including reported reactions to penicillins, cephalosporins, fluconazole, Bactrim, and multiple others. Intradermal skin testing in 2019 was negative for several agents including penicillins and cephalosporins, but reactions remain documented in chart.    EBV positivity, Sjögren s syndrome, and basal cell carcinoma:  Relevant for immunosuppressive management; most recent skin cancer treated with Mohs surgery on 4/8/2025         Subjective and Interval Events       Patient remains well today and denies abdominal pain, fever, chills, or other systemic symptoms. She is eating and tolerating oral medications. No stoma-related issues. No new drainage from the surgical site. She has been ambulatory during the day.     Today s WBC is 11, back to baseline. Her drains are being removed by CT surgery.             Medications:   Medications that Require Transfusion:   Current Facility-Administered Medications   Medication Dose Route Frequency Provider Last Rate Last Admin     Scheduled Medications:    Current Facility-Administered Medications   Medication Dose Route Frequency Provider Last Rate Last Admin    acetaminophen (TYLENOL) tablet 975 mg  975 mg Oral BID Jah Bettencourt PA-C   975 mg at 06/02/25 1011    [Held by provider] amLODIPine (NORVASC) tablet 5 mg  5 mg Oral Daily Tess Rodriguez MD   5 mg at 06/01/25 0741    apixaban ANTICOAGULANT (ELIQUIS) tablet 2.5 mg  2.5 mg Oral BID Negrito Christensen MD   2.5 mg at 06/09/25 0843    calcitRIOL (ROCALTROL) capsule 0.25 mcg  0.25 mcg Oral Daily Jah Bettencourt PA-C   0.25 mcg at 06/09/25 0844    ceftolozane-tazobactam (ZERBAXA) 1.5 g vial to attach to  ml bag  1.5 g Intravenous Q8H Lisa Ferreira  mL/hr at 06/08/25 1345 1.5 g at 06/09/25 0643    diclofenac (VOLTAREN) 1 % topical gel 4 g  4 g Topical 4x Daily Jah Bettencourt PA-C   4 g at 06/04/25 1933    estradiol (ESTRACE) cream 2 g  2 g Vaginal Once per day on Monday Wednesday Friday Jah Bettencourt PA-C   2 g at 06/06/25 2029    ezetimibe (ZETIA) tablet 10 mg  10 mg Oral At Bedtime Jah Bettencourt PA-C   10 mg at 06/08/25 2310    ferrous sulfate (FEROSUL) tablet 325 mg  325 mg Oral At Bedtime Jah Bettencourt PA-C   325 mg at 06/07/25 2109    folic acid (FOLVITE) tablet 1,000 mcg  1,000 mcg Oral At Bedtime Jah Bettencourt PA-C   1,000 mcg at 06/08/25 2310    gabapentin (NEURONTIN) solution 300 mg  300 mg Oral At Bedtime Jah Bettencourt PA-C   300 mg at 06/08/25 2308    [Held by provider] hydrALAZINE (APRESOLINE) tablet 50 mg  50 mg Oral TID Jah Bettencourt PA-C        hydrocortisone sodium succinate PF (solu-CORTEF) injection 12.5 mg  12.5 mg Intravenous Q8H Vivian Russo MD   12.5 mg at 06/09/25 0846    insulin aspart (NovoLOG) injection (RAPID ACTING)  1-10 Units Subcutaneous TID AC Negrito Christensen MD   4 Units at 06/09/25 0840    insulin aspart (NovoLOG) injection (RAPID ACTING)  1-7 Units Subcutaneous At Bedtime Negrito Christensen MD   2 Units at 06/08/25 2317    levothyroxine  (SYNTHROID/LEVOTHROID) tablet 175 mcg  175 mcg Oral QAM AC Jah Bettencourt PA-C   175 mcg at 06/09/25 0844    Lidocaine (LIDOCARE) 4 % Patch 2 patch  2 patch Transdermal Q24H Josefina Perea DO   2 patch at 06/01/25 0740    linezolid (ZYVOX) tablet 600 mg  600 mg Oral Q12H [facility] (08/20) Maribel Landin MD   600 mg at 06/09/25 0843    melatonin tablet 1 mg  1 mg Oral At Bedtime Josefina Perea DO        [Held by provider] metoprolol tartrate (LOPRESSOR) half-tab 12.5 mg  12.5 mg Oral BID Josfeina Perea DO   12.5 mg at 05/31/25 0757    multivitamin, therapeutic (THERA-VIT) tablet 1 tablet  1 tablet Oral Daily Josefina Perea DO   1 tablet at 06/09/25 0843    Nutrisource Fiber PO packet 1 each  1 packet Oral Daily Jah Bettencourt PA-C   1 each at 06/02/25 1723    nystatin (MYCOSTATIN) suspension 1,000,000 Units  1,000,000 Units Oral 4x Daily Jah Bettencourt PA-C   1,000,000 Units at 06/09/25 0842    omega-3 acid ethyl esters (LOVAZA) capsule 1 g  1 g Oral BID Tess Rodriguez MD   1 g at 06/09/25 0842    [Held by provider] predniSONE (DELTASONE) half-tab 9 mg  9 mg Oral Daily Negrito Christensen MD        sennosides (SENOKOT) tablet 2 tablet  2 tablet Oral or Feeding Tube BID Norah Jaquez CNP   2 tablet at 06/09/25 0844    sertraline (ZOLOFT) tablet 50 mg  50 mg Oral Daily Jah Bettencourt PA-C   50 mg at 06/09/25 0844    sodium chloride (PF) 0.9% PF flush 3 mL  3 mL Intracatheter Q8H [facility] Jah Bettencourt PA-C   3 mL at 06/08/25 2312    tacrolimus (PROGRAF BRAND) suspension 2 mg  2 mg Oral BID IS Luzma Osman APRN CNP   2 mg at 06/09/25 0842    ursodiol (ACTIGALL) tablet 250 mg  250 mg Oral BID Jah Bettencourt PA-C   250 mg at 06/09/25 0843    vitamin A 3 MG (57776 UNITS) capsule 20,000 Units  20,000 Units Oral Daily Al Campbell MD   20,000 Units at 06/09/25 0844          Physical Exam     Physical Exam     Vital signs:  BP (!) 145/71 (BP Location: Left arm)   Pulse 72   Temp 98  F (36.7  " C) (Oral)   Resp 18   Ht 1.702 m (5' 7\")   Wt 83.9 kg (184 lb 15.5 oz)   LMP 06/01/1988 (Approximate)   SpO2 94%   BMI 28.97 kg/m      Afebrile, /59, HR 91, SpO2 100% on RA.  Alert, no acute distress.  Lungs: Mild crackles, no increased work of breathing.  Cardiac: Irregularly irregular rhythm, no murmurs.  Abdomen: Soft, NT/ND, healing incision without erythema or discharge.  Colostomy: Intact with functional output.  Neuro: Non-focal.    Data     Data   Laboratory data and imaging listed below was reviewed prior to this encounter.     WBC trend: 14.6 (6/6), up from 11.4 (6/5) and 11.5 (6/4)  CT A/P (6/6): Non-inflammatory pelvic fluid; no abscess or concerning findings  Blood cultures: All negative since 6/3  TTE: No vegetations             Jose Ramon Lee MD  [facility]  Contact information available via [facility] Paging/Directory     06/09/2025   "

## 2025-06-09 NOTE — PROGRESS NOTES
[facility]    Progress Note - Colorectal Surgery Service       Date of Admission:  5/28/2025    Assessment & Plan: Surgery   Mrs. Thompson is a 75 year old female with a complex PMH, some of which includes CKD stage 3, CVA and DVT on anticoag, atrial fibrillation, DM2, biliary cirrhosis s/p prior liver transplant admitted for smoldering diverticulitis with 4.1cm abscess on prolonged IV abx as an outpatient.  Was admitted on 5/29 with worsening abdominal pain, hypotension due to gram negative septicemia, diarrhea.  Now s/p exploratory laparotomy, sigmoidectomy and end colostomy on 5/30/25, recovering appropriately with return of bowel function.    Polymicrobial bacteremia likely 2/2 GI source. Having return of bowel function. CT A/P on 6/6 without intra-abd fluid collections nor acute intra-abd pathology. Leukocytosis downtrending.     - Reg diet.  Recommend protein nutritional supplements as able.    - Will remove PATY drain on 6/9/25, continue staples   - restarted eliquis on 6/4  - Appreciate ID recommendations (now on Zebraxa as well as Lineazolid)  - Hx of liver transplant - immunosuppression per transplant immunology team (changed from sirolimus to tac).  On IV hydrocort taper for stress dosing.   - High dose Vitamin A x30 days post-op for wound healing support in setting of immunosuppression  - Getting senokot BID  - Continue WOC ostomy cares   - Encourage out of bed and ambulation, incentive spirometer use      Diet: Snacks/Supplements Adult: Other; PRN per pt request; With Meals  Snacks/Supplements Adult: Other; Offer pt Gelatein Plus or 20 TID with meals - please ask what flavor she prefers with each meal; With Meals  Room Service  Advance Diet as Tolerated: Regular Diet Adult; Regular Diet Adult  Snacks/Supplements Adult: Glucerna; Between Meals    DVT Prophylaxis: SQH  Ron Catheter: Not present  Lines: None       Drains: PRESENT         - 1 Closed/Suction  "Drain(s)    - 1 Ureteral Drain/Stent(s)   Cardiac Monitoring: ACTIVE order. Indication: Tachyarrhythmias, acute (48 hours)  Code Status: Full Code      Clinically Significant Risk Factors               # Hypoalbuminemia: Lowest albumin = 2.4 g/dL at 5/29/2025  3:15 PM, will monitor as appropriate       # Hypertension: Noted on problem list            # Overweight: Estimated body mass index is 28.24 kg/m  as calculated from the following:    Height as of this encounter: 1.702 m (5' 7\").    Weight as of this encounter: 81.8 kg (180 lb 5.4 oz).        # Financial/Environmental Concerns: none         Social Drivers of Health   Tobacco Use: Medium Risk (5/27/2025)    Patient History     Smoking Tobacco Use: Former     Smokeless Tobacco Use: Never   Physical Activity: Insufficiently Active (11/9/2023)    Received from [employer]    Exercise Vital Sign     Days of Exercise per Week: 5 days     Minutes of Exercise per Session: 10 min         Disposition Plan        Patient was seen with colorectal fellow, Dr. Montano, and discussed with staff surgeon, Dr. Campbell.     Katharina Soriano MD  General Surgery PGY-1    **For on call schedules and contact information, please refer to [facility]**      Non-urgent messages: Securely message with [employer] (more info)  Text page via [facility] Paging/Directory     ______________________________________________________________________    Interval History   No acute events overnight. She does not have nausea/vomiting, tolerating PO intake without nausea/vomiting. Does not report abdominal pain. Having colostomy output of thick stool. PATY drain continues to have cloudy serosanguinous output 110ml/24hrs, 100ml the day prior.     Physical Exam   Vital Signs: Temp: 97.5  F (36.4  C) Temp src: Axillary BP: (!) 159/97 Pulse: 81   Resp: 18 SpO2: 99 % O2 Device: Nasal cannula Oxygen Delivery: 1 LPM  Weight: 180 lbs 5.38 oz    Intake/Output Summary (Last 24 hours) at 6/8/2025 0820  Last data filed at 6/8/2025 " 0545  Gross per 24 hour   Intake 1565 ml   Output 1400 ml   Net 165 ml      General Appearance: Alert and oriented, no acute distress  Respiratory: no increased work of breathing   Cardiovascular: regular rate   GI: soft, nondistended, non-tender to palpation around ostomy in LLQ, ostomy pink and well perfused with gas as well as thickened stool, incision clean/dry/intact with staples, PATY with cloudy serosanguinous output 110ml/24 hours  Skin: no rashes    Data     I have personally reviewed the following data over the past 24 hrs:    N/A  \   N/A   / N/A     140 107 44.7 (H) /  200 (H)   4.5 24 1.64 (H) \       Imaging results reviewed over the past 24 hrs:   No results found for this or any previous visit (from the past 24 hours).

## 2025-06-09 NOTE — PROGRESS NOTES
[facility]    Progress Note - Medicine Service, JOVANNY TEAM 3       Date of Admission:  5/28/2025    Assessment & Plan   Luz Thompson is a 75 year old female who was admitted on 5/28/2025 with past medical history of atrial fibrillation, DVT on eliquis, CVA, CKD III, HFpEF (TTE 3/9/25 EF 60-65%), T2DM, biliary cirrhosis s/p liver transplant in 2002, EBV, HTN, HLD, Sjogren's syndrome, Basal Cell Carincoma s/p MOHS on 4/8/25, thyroid cancer s/p thyroidectomy in 2010, Sjogren's syndrome, and diverticulitis with recent admission from 4/14-4/23 for ESBL UTI & E. Faecalis bacteremia related to sigmoid microperforation (treated with IV ertapenem) who is admitted for abdominal pain and hypotension, found to have smoldering diverticulitis with 4.1cm abscess s/p open sigmoidectomy and end colostomy on 5/30/25, with Psuedomonas positive blood cultures drawn 5/28.  Ongoing IV antibiotics due to bacteremia (GPC and VRE) and weaning off stress dose steroids.     Today:  - Finished Abx   - Space steroids. Hydrocortisone 12.5mg q12 hours  - Approaching discharge, will need to monitor for a day off of IV steroids before going out     # Diverticulitis c/b abscess s/p open sigmoidectomy with end colostomy 5/30/25  # Sepsis 2/2 Klebsiella pneumoniae and Pseudomonas aeruginosa bacteremia 5/28, likely of intra-abdominal origin   # Hypotension 2/2 sepsis  Patient now hypertensive, after coming off pressors on 5/30. Currently with ongoing IV steroid taper, will plan to stop continuous maintenance fluids. Will hold PTA antihypertensives while titrating and resume when able.  Transitioned from meropenem to ceftolozane-tazobactam due to resistance (psuedomonas coverage) and switch vancomycin to linezolid iso DERREK for coverage of previous VRE E Facecium.  From ID perspective, there is no good oral antibiotic options and patient will complete 7-day IV antibiotic course at the hospital  (first day of blood culture clearance 6/3). New cloudy PATY drain output on 6/6. Ongoing leukocytosis but no new localizing symptoms so will continue to monitor.   -CT A/P negative abscess/fluid collection   - CRS consulted              - ADAT  - subcutaneous heparin for DVT ppx, continue to hold eliquis for now  - Hx of liver transplant - immunosuppression per transplant immunology team   - High dose Vitamin A x30 days post-op for wound healing support in setting of immunosuppression  - senokot BID  - Continue WOC ostomy cares   - Removed PATY drain   - Encourage out of bed and ambulation, incentive spirometer use   - Transplant ID following  - meropenem (5/31- 6/3) for pseudomonas coverage  - linezolid (6/1 -6/3)  for enterococcal bacteremia  - Ceftolozane-Tazobactam (6/3-6/9), for a 7-day course   - Pain regimen:              - Scheduled: Robaxin QID, tylenol 975mg BID, gabapentin 300mg QHS, Lidocaine patches daily, Diclofenac QID  -  PRN: PO oxycodone 5mg  - Blood cultures drawn 5/28 positive for Klebsiella and Pseudomonas  - Bcx (5/31): E Faecium VRE  - Bcx (6/2): GPC in pairs and chains   - Bcx (6/3): NGTD   - Space Hydrocortisone 12.5mg [id number]  - If decreasing back to IS dosing, would pause on pred 10 until tac level therapeutic (goal 3-5)   - hold PTA amlodipine while tapering fluids and IV steroids    # Acute blood loss anemia    # Anemia of critical illness  # coagulopathy due to cirrhosis and surgical blood loss    # thrombocytopenia   # anemia of critical illness   No overt bleeding and ostomy output/vitals stable.   - Transfuse if hgb <7.0 or signs/symptoms of hypoperfusion. Monitor and trend.   - PTA Ferous sulfate, folic acid  - Transfused 1 unit pRBC (6/7)     #C/f TIA   In the morning of 6/4, patient states that she has had speech problems in the last couple of days since the surgery.  She describes that she has trouble getting her words out and that her speech does not feel as fluent as it was  previously.  She denies any new numbness or tingling.  No headache or changes in vision.  Nonfocal on exam.  CT head negative for intracranial bleed or other acute process.  TTE with bubble also negative.  Certainly possible that patient may have had a TIA following surgery as anticoagulation had been held.  Will proceed with MRI brain negative for acute infarct.    - Continue PTA Xarelto     # DERREK on CKD3, resolved   # Bilateral stent placement per urology 5/29  # ATN  DERREK in the setting of hypotension, shock, nephrotoxic meds which is now resolved after IVF and improved PO after surgery.   - Baseline creatinine 1.3-1.6  - external catheter   - PTA estradiol vaginal  cream     # Primary biliary cirrhosis s/p liver transplant 2002  # EBV +  # Reactive Leukocytosis   # Sjogren's syndrome  - Transplant Hepatology consulted  - No sirolimus  - Resumed PTA Prednisone 9 mg PO   - Tac 1 mg po BID per transplant hep              - ordered suspension formulation instead of capsule due to gelatin allergy  - CMP in AM  - PTA Nystatin QID  - PTA Ursodiol      # Acute post-op pain  # high risk delirium   - delirium precaution   - Monitor neurological status. Delirium preventions and precautions.   - pain regimen as above  - PTA sertraline  - melatonin Q HS  - PRN narcan     # Protein calorie deficit malnutrition    - Nutrition consulted  - Supplements: Gelatin Plus TID with meals + PRN per patient request  - Thera-Vit 1 tablet daily   - Vitamin A level (ordered)     # Post operative ventilatory support, resolved  # Acute hypoxic respiratory support   Currently on 1L NC.  - Supplemental oxygen to keep saturation above 92 %.  - PRN albuterol      # Paroxysmal A-fib on apixaban   # Hypertension  # HFpEF (TTE 3/9/25 EF 60-65%)   # history of CVA  # Vasoplagia  - Monitor hemodynamic status.  - Levophed off since 0400 5/31  - MAP goal >65  - PTA ezetimibe  - holding PTA amlodipine 5mg BID  - Continue PTA apixaban 2.5mg BID  - Holding  "metoprolol tartrate 12.5 mg BID  - holding hydralazine, when reordering would prefer 2 25 mg tabs   - SBP >100 or MAP >65  - weaning steroids as above      # Type 2 diabetes  # Stress hyperglycemia    - MDSSI  - Goal to keep BG< 180 for optimal wound healing   - Glucose AC/HS  - hypoglycemic protocol  - On prednisone, transitioned from hydrocortisone      # Thyroid cancer status post thyroidectomy 2010  - PTA calcitriol 0.25mcg  - PTA levothyroxine 175mcg     # Weakness and deconditioning of critical illness   - Physical and occupational therapy consult   - up with assist, fall precaution      # Basal cell carcinoma status post Mohs April 2025            Diet: Snacks/Supplements Adult: Other; PRN per pt request; With Meals  Snacks/Supplements Adult: Other; Offer pt Gelatein Plus or 20 TID with meals - please ask what flavor she prefers with each meal; With Meals  Room Service  Advance Diet as Tolerated: Regular Diet Adult; Regular Diet Adult  Snacks/Supplements Adult: Glucerna; Between Meals    DVT Prophylaxis:Eliquis  Ron Catheter: Not present  Fluids: PO  Lines: None     Cardiac Monitoring: ACTIVE order. Indication: Tachyarrhythmias, acute (48 hours)  Code Status: Full Code      Clinically Significant Risk Factors               # Hypoalbuminemia: Lowest albumin = 2.4 g/dL at 5/29/2025  3:15 PM, will monitor as appropriate     # Hypertension: Noted on problem list            # Overweight: Estimated body mass index is 28.24 kg/m  as calculated from the following:    Height as of this encounter: 1.702 m (5' 7\").    Weight as of this encounter: 81.8 kg (180 lb 5.4 oz).        # Financial/Environmental Concerns: none          Disposition Plan     Medically Ready for Discharge: Anticipated in 2-4 Days         The patient's care was discussed with the Attending Physician, Dr. Russo and Patient.    Negrito Christensen MD  Medicine Service, [facility] TEAM 3  [facility]  Securely message " with Zachariah (more info)  Text page via [facility] Paging/Directory   See signed in provider for up to date coverage information  ______________________________________________________________________    Interval History   No acute events overnight.  Doing well today, no new complaints or pain.  Family ember updated at bedside.  Informed that she is nearing discharge and will be ready once she is weaned off of IV steroids.    Physical Exam   Vital Signs: Temp: 97.5  F (36.4  C) Temp src: Axillary BP: (!) 159/97 Pulse: 76   Resp: 18 SpO2: 100 % O2 Device: Nasal cannula Oxygen Delivery: 1 LPM  Weight: 180 lbs 5.38 oz    Gen: Comfortable appearing older woman lying in chair by the bed in no distress  HEENT: Atraumatic, nonicteric sclera  CV: RRR, limbs well perfused. No accessory heart sounds.   Pulm: Clear to auscultation in all fields, no wheezing or crackles  Abd: Nontender, nondistended abdomen. Ostomy output stable, no change in characteristic. PATY drain no longer in place  Skin: No lesions on exposed skin  Neuro: Appears alert and is appropriately conversational, moving all limbs spontaneously  Psych: Appropriate to the situation      Medical Decision Making       Please see A&P for additional details of medical decision making.      Data     I have personally reviewed the following data over the past 24 hrs:    N/A  \   N/A   / N/A     140 107 44.7 (H) /  200 (H)   4.5 24 1.64 (H) \       Imaging results reviewed over the past 24 hrs:   No results found for this or any previous visit (from the past 24 hours).

## 2025-06-09 NOTE — PROGRESS NOTES
[facility] Nurse Inpatient Assessment     Consulted for: new end colostomy    Summary:   5/30 - Pt's daughter Gloria at bedside for pouch change demo, would like to be present for education  6/3 - daughter not here - stool present in pouch  6/4: daughter not at bedside- unable to make it to hospital today. Pouch intact. Pt too tired for education today. Pt due for pouch change Thursday.   6/5: met with daughter and pt at bedside, pt able to use coloplast pouch closure much better than the manny one, pt able to remove pouch with assist. Daughter able to complete the rest of the pouch change with prompting. Placed convex coloplast pouch today but not sure if this is the correct fit, ordered some flat coloplast pouches with convex oval ring to try for tomorrow, plan to meet with daughter at 2pm tomorrow for continued teaching.   6/6: pouch intact, new supplies not available in room, ordered flat coloplast pouch and convex oval rings again. Pt and daughter prefer coloplast for the closure.  6/9: MCJ separation increased and with depth to medial side - will use Snelling convex for now, can switch to Coloplast later if Pt will be emptying pouch independently. Discussed with colorectal provider at bedside    [facility] nurse follow-up plan: daily     Patient History (according to provider note(s):      75 year old female with history of liver transplant admitted for smoldering diverticulitis with 4.1cm abscess s/p open sigmoidectomy and end colostomy on 5/29/25,     Assessment:      Areas visualized during today's visit: Focused: Abdomen      6/2 LLQ      6/9      Assessment of new end Colostomy:  Diagnosis Pertinent to Stoma: Diverticulitis with perforation      Surgery Date: 5/29/25  Surgeon: Al Campbell MD       [facility]ospital[facility]  Pouching system in place on assessment today: Coloplast one piece convex, barrier ring, and skin prep   Pouch last changed/wear time: 4  days  Reason for pouch change today: ostomy education and routine schedule  Effectiveness of current pouching/ supply plan: Attempting new system, will re-evaluate next assessment  Change made with ostomy management today: Yes  Pouching system placed today: Marina two piece, cut to fit, convex, ostomy paste, skin prep, and Hydrofera Blue Classic   Supplies: ordered Marina pouches and convex barriers, Hydrofera Blue Classic    Last photo: 6/9/25    Stoma location: LLQ  Stoma size: 1 1/2 inches,   Stoma appearance: red, round, moist, retracted, and necrotic 11 -1 o'clock  Mucocutaneous junction:   circumferentially - irregular shape area with depth from 6-11 o'clock, approx 2 x 1.5 x 1 cm deep  Peristomal complication(s): none   Output: soft and brown stool, gas  Output volume emptied during visit: 100ml    Abdominal assessment: Soft  Surgical site(s): dressing dry and intact  NG still in place? No  Pain: denies  Is patient still on a PCA? No    Ostomy education assessment:  Participant of teaching session today: patient  and family member Gloria  Education completed today: Stoma assessment, Pouching system assessment , Adjustment of pouching plan, Ostomy accessory product use , Pouch emptying return demonstration, When to seek medical attention, and Lifestyle adjustments   Educational materials/methods: Verbal, Demonstration, Return demonstration, and Addressed patient/caregiver questions/concerns  Education still needed: Pouch change return demonstration and Discharge instructions-- additional pouch change practice for daughter (plan is for TCU)  Learning Comprehension:   Psychosocial assessment: Pt says she wants to be able to change pouch after discharge - wants to return to independent living apartment with some assistance (was in TCU prior to this admission and plan is for TCU this discharge). Daughter can assist if needed, wants to be present for teaching  Patient readiness for education today:  attentive and active participation,   Following today's visit: patient  and family member daughter is able to demonstrate;         1. How to empty their pouch? Yes, with moderate assist, and Needs additional practice         2. How to change their pouch? Yes, with moderate assist, and Needs additional practice          Preparation for discharge completed: No discharge preparation started yet-- still adjusting pouching plan   Preparation for discharge still needed: Placed prescription recommendations in discharge navigator for MD to sign, Ensured patient has extra supplies for discharge, Discussed how to order supplies after discharge , Ordered samples from  after gaining consent from patient/caregiver, Discussed how and when to make an outpatient [facility] nurse appointment after discharge, Prepared for discharge home with home care, and Discuss signs/symptoms of when to seek medical attention  Pt support system on discharge: Daughter Gloria  [facility] recommend home care? By WOC RN if possible  Face to face time: 20 minutes    Treatment Plan:     Wound location: peristomal - medial side      Last photo: 6/9/25  Wound due to: MCJ separation  Wound history/plan of care: early MCJ separation and small areas of necrosis - more significant wound noted 6/9   Wound base: 50 % Adipose tissue, 25 % Slough, Yellow, and Stringy brown, 25% red non granular     Palpation of the wound bed: normal      Drainage: moderate     Description of drainage: serous     Measurements (length x width x depth, in cm): irregular shape 2  x 1.5  x  1 cm      Tunneling: N/A     Undermining: N/A  Periwound skin: Intact      Color: normal and consistent with surrounding tissue      Temperature: normal   Odor: none  Pain: mild and facial expression of distress, tender with cleaning and measurements  Pain interventions prior to dressing change: slow and gentle cares  and distraction  Treatment goal: Infection control/prevention, Increase granulation,  "Protection, Remove necrotic tissue, and successful ostomy pouching  STATUS: initial assessment  Supplies ordered: ordered Hydrofera Blue Classic    Peristomal wound: With pouch changes - clean open wound to medial side of stoma with wound spray. Cut piece of Hydrofera Blue Classic to size of open area. Wet Hydrofera Blue (#[id number]) with saline and squeeze out excess, place in wound base prior to applying ostomy pouch.    Orders: written      LL Colostomy pouching plan:   See wound care order for peristomal wound. WOC to assess and do pouch change daily as able.  Pouching system: ostomy supplies pouches: Stacy 57 FECAL ([id number]) ostomy supplies barrier: Stacy 57mm SOFT CONVEX ([id number])  Accessories used: WO ostomy accessories: Hydrofera Blue Classic to fill open area on medial side of stoma (MCJ separation), 2\" Shireen Ring ([id number]) and Cavilon no sting barrier film ([id number])   Frequency of pouch changes: Daily due to MCJ separation   WOC follow up plan: Daily Monday-Friday (as able)  Bedside RN interventions: Change pouch PRN if leaking using the supplies above, Empty pouch when 1/3 to 1/2 full, ensure to clean pouch outlet after emptying to prevent odor, Notify WOC for ongoing pouch leakage, Document stoma appearance and output volume, color, and consistency every shift, and Encourage patient to empty pouch with assist     Orders: Updated        DATA:     Current support surface: Standard    Containment of urine/stool: Suction based external urinary catheter  and Colostomy pouch  BMI: Body mass index is 28.28 kg/m .   Active diet order: Orders Placed This Encounter      Clear Liquid Diet     Output: I/O last 3 completed shifts:  In: 1756.64 [P.O.:270; I.V.:1436.64; IV Piggyback:50]  Out: 1680 [Urine:1250; Drains:430]     Labs:   Recent Labs   Lab 05/30/25  0412 05/29/25  1515 05/29/25  1051 05/29/25  0546   ALBUMIN  --  2.4*  --   --    HGB 8.6* 8.5*   < > 7.6*   INR  --   --   --  1.49*   WBC 20.2* 20.2*  --  " 9.1   A1C  --  6.3*  --   --     < > = values in this interval not displayed.     Pressure injury risk assessment:   Sensory Perception: 2-->very limited  Moisture: 3-->occasionally moist  Activity: 1-->bedfast  Mobility: 2-->very limited  Nutrition: 2-->probably inadequate  Friction and Shear: 2-->potential problem  Shashank Score: 12      Pager no longer is use, please contact through [place] group: [facility] [place]   Dept. Office Number: [phone]    Katelyn Parikh RN CWOCN

## 2025-06-09 NOTE — PROGRESS NOTES
CLINICAL NUTRITION SERVICES - REASSESSMENT NOTE     RECOMMENDATIONS FOR MDs/PROVIDERS TO ORDER:  Vit A supplementation per MD, could consider discontinuing d/t Vit A WNL despite CRP >20    Registered Dietitian Interventions:  Encouraged small, frequent meals/snacks with high protein sources  Continue MVI(consider chewable option as needed)  Encourage adequate hydration  Trial Glucerna Therapeutic Nutrition Shake contains 220 kcal, 10 grams protein, 26 grams carbohydrate, 9 grams fat, 4 grams fiber per 8 fl oz.   Additional ONS/snack PRN    Future/Additional Recommendations:  Monitor nutrition related findings and follow up per protocol     SUBJECTIVE INFORMATION  Assessed patient in room.    CLINICAL/MEDICAL STATUS UPDATES  reviewed  CURRENT NUTRITION NEEDS  Dosing Weight: 67 kg, based on adjusted wt   ASSESSED NUTRITION NEEDS   Estimated Energy Needs: 5721-6979 kcals/day (25 - 30 kcals/kg)   Justification: Maintenance   Estimated Protein Needs:  grams protein/day (1.2 - 1.5 grams of pro/kg)   Justification: Hypercatabolism with critical illness and Post-op   Estimated Fluid Needs: 1 mL/kcal   Justification: Maintenance and Per provider pending fluid status     CURRENT NUTRITION ORDERS  Diet: Regular  + Glucerna BID     CURRENT INTAKE/TOLERANCE  Pt reports reduced appetite but states she has been slowly trying to increase intake each day. For example added breakfast potatoes to her breakfast order this morning. Denies N/V but reports abdominal fullness. Denies concerns/issues with ostomy output at this time. Reports some difficulty chewing d/t dentures but this is not a new issue. Reports tolerance to glucerna. Reports she had been taking benefiber PTA in coffee but only has intermittently been receiving nutrisource fiber during admit. Discussed that this could be on hold d/t recent ostomy. Reports she was not taking Vitamin A PTA but was taking a MVI and calcitriol.   Intake/Tolerance: Patient consuming   % of 3 meal(s) daily.   Allergy: strawberry extract  NEW FINDINGS  Weight:   Most Recent Weight: 81.8 kg (180 lb 5.4 oz)  on 6/8/25 via Bed scale  Body mass index is 28.24 kg/m .  Wt stable from admission.   Skin/wounds: No issues noted  GI symptoms: Ostomy output 200-250ml over last 3 days per I/O's.  Nutrition-relevant labs: BUN 44.7, cre 1.64, GFR 32, glu 200  Nutrition-relevant medications: calcitriol, ferrous sulfate, zetia, solucortef, insulin, synthroid, melatonin, thera-vit, nutrisource fiber daily, omega 3 capsule, prednisone, senokot, prograf, Vit A    MALNUTRITION  % Intake: Decreased intake does not meet criteria  % Weight Loss: Weight loss does not meet criteria   Subcutaneous Fat Loss: None observed  Muscle Loss: None observed  Fluid Accumulation/Edema:Does not meet criteria  Malnutrition Diagnosis: Patient does not meet two of the established criteria necessary for diagnosing malnutrition  Malnutrition Present on Admission: No  EVALUATION OF THE PROGRESS TOWARD GOALS   Previous Goals  Patient to consume % of nutritionally adequate meal trays TID, or the equivalent with supplements/snacks.   Evaluation: Progressing    Previous Nutrition Diagnosis  Increased nutrient needs (protein) related to s/p surgery as evidenced by assessed protein needs of  grams protein/day (1.2 - 1.5 grams of pro/kg).   Evaluation: Improving    NUTRITION DIAGNOSIS  Increased nutrient needs (protein) related to s/p surgery as evidenced by assessed protein needs of  grams protein/day (1.2 - 1.5 grams of pro/kg).     INTERVENTIONS  Manage composition of oral intake  Medical food supplement therapy  Vitamin and mineral supplement therapy    Goals  Patient to consume % of nutritionally adequate meal trays TID, or the equivalent with supplements/snacks.      Monitoring/Evaluation      Progress toward goals will be monitored and evaluated per policy.    Jane Burks MS, RD, LD, [facility]    6C (beds 8972-0231)  + 7C (beds 2044-9515) + ED + Obs  Available in Vocera by name or unit dietitian

## 2025-06-09 NOTE — PLAN OF CARE
Goal Outcome Evaluation:      Plan of Care Reviewed With: patient    Overall Patient Progress: improvingOverall Patient Progress: improving    Outcome Evaluation: A&) x4 colostomy emptied patient helped, cont tele running controlled AFIB continuous pulse ox in place on 1 LNC just because she desats when asleep while awake sats high 90s. PAtient stated she has a sleep apnea study cominig up soon. Was told patient got up to chair today for a few hours with two assist and pivot. Bed alarm on call light and belongings within reach. PATY drain dressing changed previous shift. Emptied PATY got out 25 ml of tono pink liquid. Back to bulb suction. Will continue to monitor.

## 2025-06-09 NOTE — PROGRESS NOTES
Care Management Follow Up    Length of Stay (days): 12    Expected Discharge Date: 06/10/2025     Concerns to be Addressed: discharge planning     Patient plan of care discussed at interdisciplinary rounds: Yes    Anticipated Discharge Disposition: Transitional Care  Anticipated Discharge Services: Transportation Services  Anticipated Discharge DME: None    Patient/family educated on Medicare website which has current facility and service quality ratings: yes  Education Provided on the Discharge Plan:  yes  Patient/Family in Agreement with the Plan: yes    Referrals Placed by CM/SW:      Accepted  [facility] ([facility]) - TCU  [address] [place] [address]  P: [phone]  F: [phone]  - Will need to be sent with 5 days of ostomy supplies    Private pay costs discussed: transportation costs    Discussed  Partnership in Safe Discharge Planning  document with patient/family: Yes: Document provided     Handoff Completed: Yes, [facility] PCP: Internal handoff referral completed    Additional Information:  Patient not med ready. Dr. Christensen with the primary medicine team reported they anticipate the patient will be med ready for TCU tomorrow.    Patient has been accepted to [facility] ([facility]) for TCU placement. They can accept the patient once she is med ready.    Next Steps: Patient will need wheelchair transportation to TCU arranged.   IMM needed at the time of discharge.  Updated PAS  SW will continue to follow for discharge planning     NAINA Hill  [address]   Office: [phone]  [email or web address]  Securely message with [employer] (Search Name or 7C Med Surg [phone] )  Text page via [facility] Paging/Directory   See signed in provider for up to date coverage information  Social Work & Care Management Department

## 2025-06-09 NOTE — PLAN OF CARE
Shift Hours: 0700 - 1900    Assessment:  Body systems assessments were at patient's baseline.        Activity     Fall Risk Score: 60   Bed alarm on? Yes     Activity Assistance Provided: assistance, 2 people          Pain: denies     Labs/RN Managed Protocols: K+ Mag. Phos     Lines/Drains: Right PIV SL between IV abx and Ostomy     Nutrition: regular Appetite fair     Goal Outcome Evaluation   Patient A & O X 4 , with [place] , hearing aid in use . No respiratory distress noted , denies pain and  received scheduled medications . Appetite fair , 186, 236 insulin given per order. Ostomy in placed  WOC change the bag today . Skin generalized bruising repositioned q2hr . Patient able to make needs known. Call light within reach, update MD with change.         Barriers to Discharge:   Plan Possible Discharge to  TCU tomorrow.

## 2025-06-10 ENCOUNTER — TELEPHONE (OUTPATIENT)
Dept: NEPHROLOGY | Facility: CLINIC | Age: 75
End: 2025-06-10

## 2025-06-10 ENCOUNTER — APPOINTMENT (OUTPATIENT)
Dept: PHYSICAL THERAPY | Facility: CLINIC | Age: 75
DRG: 329 | End: 2025-06-10
Payer: MEDICARE

## 2025-06-10 ENCOUNTER — APPOINTMENT (OUTPATIENT)
Dept: OCCUPATIONAL THERAPY | Facility: CLINIC | Age: 75
DRG: 329 | End: 2025-06-10
Payer: MEDICARE

## 2025-06-10 DIAGNOSIS — N39.0 URINARY TRACT INFECTION: ICD-10-CM

## 2025-06-10 LAB
ANION GAP SERPL CALCULATED.3IONS-SCNC: 12 MMOL/L (ref 7–15)
BACTERIA SPEC CULT: NO GROWTH
BACTERIA SPEC CULT: NO GROWTH
BUN SERPL-MCNC: 49.4 MG/DL (ref 8–23)
CALCIUM SERPL-MCNC: 8.5 MG/DL (ref 8.8–10.4)
CHLORIDE SERPL-SCNC: 106 MMOL/L (ref 98–107)
CREAT SERPL-MCNC: 1.62 MG/DL (ref 0.51–0.95)
EGFRCR SERPLBLD CKD-EPI 2021: 33 ML/MIN/1.73M2
ERYTHROCYTE [DISTWIDTH] IN BLOOD BY AUTOMATED COUNT: 17.1 % (ref 10–15)
GLUCOSE BLDC GLUCOMTR-MCNC: 112 MG/DL (ref 70–99)
GLUCOSE BLDC GLUCOMTR-MCNC: 120 MG/DL (ref 70–99)
GLUCOSE BLDC GLUCOMTR-MCNC: 142 MG/DL (ref 70–99)
GLUCOSE BLDC GLUCOMTR-MCNC: 206 MG/DL (ref 70–99)
GLUCOSE BLDC GLUCOMTR-MCNC: 229 MG/DL (ref 70–99)
GLUCOSE SERPL-MCNC: 227 MG/DL (ref 70–99)
HCO3 SERPL-SCNC: 21 MMOL/L (ref 22–29)
HCT VFR BLD AUTO: 23.7 % (ref 35–47)
HGB BLD-MCNC: 7.4 G/DL (ref 11.7–15.7)
MAGNESIUM SERPL-MCNC: 2.1 MG/DL (ref 1.7–2.3)
MCH RBC QN AUTO: 27.9 PG (ref 26.5–33)
MCHC RBC AUTO-ENTMCNC: 31.2 G/DL (ref 31.5–36.5)
MCV RBC AUTO: 89 FL (ref 78–100)
PHOSPHATE SERPL-MCNC: 2.4 MG/DL (ref 2.5–4.5)
PLATELET # BLD AUTO: 270 10E3/UL (ref 150–450)
POTASSIUM SERPL-SCNC: 4.5 MMOL/L (ref 3.4–5.3)
RBC # BLD AUTO: 2.65 10E6/UL (ref 3.8–5.2)
SODIUM SERPL-SCNC: 139 MMOL/L (ref 135–145)
WBC # BLD AUTO: 10.6 10E3/UL (ref 4–11)

## 2025-06-10 PROCEDURE — 97530 THERAPEUTIC ACTIVITIES: CPT | Mod: GO | Performed by: OCCUPATIONAL THERAPIST

## 2025-06-10 PROCEDURE — 250N000012 HC RX MED GY IP 250 OP 636 PS 637: Performed by: NURSE PRACTITIONER

## 2025-06-10 PROCEDURE — 120N000002 HC R&B MED SURG/OB UMMC

## 2025-06-10 PROCEDURE — 250N000013 HC RX MED GY IP 250 OP 250 PS 637

## 2025-06-10 PROCEDURE — 97530 THERAPEUTIC ACTIVITIES: CPT | Mod: GP

## 2025-06-10 PROCEDURE — 36415 COLL VENOUS BLD VENIPUNCTURE: CPT

## 2025-06-10 PROCEDURE — 97535 SELF CARE MNGMENT TRAINING: CPT | Mod: GO | Performed by: OCCUPATIONAL THERAPIST

## 2025-06-10 PROCEDURE — 250N000013 HC RX MED GY IP 250 OP 250 PS 637: Performed by: SURGERY

## 2025-06-10 PROCEDURE — 85018 HEMOGLOBIN: CPT | Performed by: PHYSICIAN ASSISTANT

## 2025-06-10 PROCEDURE — 83735 ASSAY OF MAGNESIUM: CPT | Performed by: STUDENT IN AN ORGANIZED HEALTH CARE EDUCATION/TRAINING PROGRAM

## 2025-06-10 PROCEDURE — G0545 PR INHRENT VISIT TO INPT/OBS W CNFRM/SUSPCT INFCT DIS BY INFCT DIS SPCIALST: HCPCS | Performed by: INTERNAL MEDICINE

## 2025-06-10 PROCEDURE — 99233 SBSQ HOSP IP/OBS HIGH 50: CPT | Mod: 24 | Performed by: INTERNAL MEDICINE

## 2025-06-10 PROCEDURE — 97110 THERAPEUTIC EXERCISES: CPT | Mod: GP

## 2025-06-10 PROCEDURE — 250N000009 HC RX 250: Performed by: STUDENT IN AN ORGANIZED HEALTH CARE EDUCATION/TRAINING PROGRAM

## 2025-06-10 PROCEDURE — 99232 SBSQ HOSP IP/OBS MODERATE 35: CPT | Mod: GC | Performed by: STUDENT IN AN ORGANIZED HEALTH CARE EDUCATION/TRAINING PROGRAM

## 2025-06-10 PROCEDURE — 250N000013 HC RX MED GY IP 250 OP 250 PS 637: Performed by: PHYSICIAN ASSISTANT

## 2025-06-10 PROCEDURE — 80048 BASIC METABOLIC PNL TOTAL CA: CPT

## 2025-06-10 PROCEDURE — 258N000003 HC RX IP 258 OP 636: Performed by: STUDENT IN AN ORGANIZED HEALTH CARE EDUCATION/TRAINING PROGRAM

## 2025-06-10 PROCEDURE — G0463 HOSPITAL OUTPT CLINIC VISIT: HCPCS

## 2025-06-10 PROCEDURE — 250N000011 HC RX IP 250 OP 636

## 2025-06-10 PROCEDURE — 84100 ASSAY OF PHOSPHORUS: CPT | Performed by: STUDENT IN AN ORGANIZED HEALTH CARE EDUCATION/TRAINING PROGRAM

## 2025-06-10 RX ORDER — SENNOSIDES 8.6 MG
2 TABLET ORAL DAILY
Status: DISCONTINUED | OUTPATIENT
Start: 2025-06-10 | End: 2025-06-12 | Stop reason: HOSPADM

## 2025-06-10 RX ORDER — ACETAMINOPHEN 325 MG/1
975 TABLET ORAL 2 TIMES DAILY PRN
Status: DISCONTINUED | OUTPATIENT
Start: 2025-06-10 | End: 2025-06-12 | Stop reason: HOSPADM

## 2025-06-10 RX ORDER — LIDOCAINE 4 G/G
2 PATCH TOPICAL
Status: DISCONTINUED | OUTPATIENT
Start: 2025-06-10 | End: 2025-06-12 | Stop reason: HOSPADM

## 2025-06-10 RX ORDER — HYDROCORTISONE SODIUM SUCCINATE 100 MG/2ML
12.5 INJECTION INTRAMUSCULAR; INTRAVENOUS EVERY 12 HOURS
Status: COMPLETED | OUTPATIENT
Start: 2025-06-10 | End: 2025-06-10

## 2025-06-10 RX ADMIN — CALCITRIOL CAPSULES 0.25 MCG 0.25 MCG: 0.25 CAPSULE ORAL at 09:27

## 2025-06-10 RX ADMIN — SENNOSIDES 2 TABLET: 8.6 TABLET, FILM COATED ORAL at 20:58

## 2025-06-10 RX ADMIN — Medication 2 MG: at 11:40

## 2025-06-10 RX ADMIN — OMEGA-3-ACID ETHYL ESTERS 1 G: 1 CAPSULE, LIQUID FILLED ORAL at 20:59

## 2025-06-10 RX ADMIN — EZETIMIBE 10 MG: 10 TABLET ORAL at 22:48

## 2025-06-10 RX ADMIN — GABAPENTIN 300 MG: 250 SOLUTION ORAL at 00:39

## 2025-06-10 RX ADMIN — NYSTATIN 1000000 UNITS: 100000 SUSPENSION ORAL at 09:28

## 2025-06-10 RX ADMIN — APIXABAN 2.5 MG: 2.5 TABLET, FILM COATED ORAL at 20:58

## 2025-06-10 RX ADMIN — URSODIOL 250 MG: 250 TABLET ORAL at 20:58

## 2025-06-10 RX ADMIN — HYDROCORTISONE SODIUM SUCCINATE 12.5 MG: 100 INJECTION, POWDER, FOR SOLUTION INTRAMUSCULAR; INTRAVENOUS at 09:29

## 2025-06-10 RX ADMIN — SODIUM PHOSPHATE, MONOBASIC, MONOHYDRATE AND SODIUM PHOSPHATE, DIBASIC ANHYDROUS 15 MMOL: 142; 276 INJECTION, SOLUTION INTRAVENOUS at 11:41

## 2025-06-10 RX ADMIN — LINEZOLID 600 MG: 600 TABLET, FILM COATED ORAL at 09:27

## 2025-06-10 RX ADMIN — SERTRALINE HYDROCHLORIDE 50 MG: 50 TABLET ORAL at 09:28

## 2025-06-10 RX ADMIN — LINEZOLID 600 MG: 600 TABLET, FILM COATED ORAL at 20:58

## 2025-06-10 RX ADMIN — Medication 20000 UNITS: at 09:28

## 2025-06-10 RX ADMIN — Medication 2 MG: at 18:07

## 2025-06-10 RX ADMIN — Medication 1 EACH: at 10:37

## 2025-06-10 RX ADMIN — APIXABAN 2.5 MG: 2.5 TABLET, FILM COATED ORAL at 09:28

## 2025-06-10 RX ADMIN — URSODIOL 250 MG: 250 TABLET ORAL at 09:28

## 2025-06-10 RX ADMIN — LEVOTHYROXINE SODIUM 175 MCG: 0.17 TABLET ORAL at 09:28

## 2025-06-10 RX ADMIN — OMEGA-3-ACID ETHYL ESTERS 1 G: 1 CAPSULE, LIQUID FILLED ORAL at 09:28

## 2025-06-10 RX ADMIN — GABAPENTIN 300 MG: 250 SOLUTION ORAL at 22:48

## 2025-06-10 RX ADMIN — HYDROCORTISONE SODIUM SUCCINATE 12.5 MG: 100 INJECTION, POWDER, FOR SOLUTION INTRAMUSCULAR; INTRAVENOUS at 21:05

## 2025-06-10 RX ADMIN — Medication 1000 MCG: at 22:48

## 2025-06-10 RX ADMIN — THERA TABS 1 TABLET: TAB at 09:28

## 2025-06-10 ASSESSMENT — ACTIVITIES OF DAILY LIVING (ADL)
ADLS_ACUITY_SCORE: 56
ADLS_ACUITY_SCORE: 60
ADLS_ACUITY_SCORE: 56
ADLS_ACUITY_SCORE: 56
ADLS_ACUITY_SCORE: 60
ADLS_ACUITY_SCORE: 60
ADLS_ACUITY_SCORE: 56
ADLS_ACUITY_SCORE: 56
ADLS_ACUITY_SCORE: 60
ADLS_ACUITY_SCORE: 56
ADLS_ACUITY_SCORE: 56
ADLS_ACUITY_SCORE: 60
ADLS_ACUITY_SCORE: 56
ADLS_ACUITY_SCORE: 60
ADLS_ACUITY_SCORE: 60
ADLS_ACUITY_SCORE: 56
ADLS_ACUITY_SCORE: 60
ADLS_ACUITY_SCORE: 60

## 2025-06-10 NOTE — PROGRESS NOTES
Care Management Follow Up    Length of Stay (days): 13    Expected Discharge Date: 06/11/2025     Concerns to be Addressed: discharge planning     Patient plan of care discussed at interdisciplinary rounds: Yes    Anticipated Discharge Disposition: Transitional Care  Anticipated Discharge Services: Transportation Services  Anticipated Discharge DME: None    Patient/family educated on Medicare website which has current facility and service quality ratings: yes  Education Provided on the Discharge Plan:  yes  Patient/Family in Agreement with the Plan: yes    Referrals Placed by CM/SW:      Accepted  [facility] ([facility] - TCU  [address] [place] [address]  P: [phone]  F: [phone]  - Will need to be sent with 5 days of ostomy supplies    Private pay costs discussed: transportation costs    Discussed  Partnership in Safe Discharge Planning  document with patient/family: Yes: Document provided     Handoff Completed: Yes, [facility] PCP: Internal handoff referral completed    Additional Information:  Dr. Keating reported to NADEEM the IV steroids have been discontinued this morning and they will want to monitor for at least 24 hrs. Provider stated they anticipate the patient will be able to discharge on Thursday.     NADEEM called [facility] and scheduled wheelchair transportation for Thursday 6/12/25 with a pickup window of [phone]    NADEEM messaged [facility] liaison Gayatri Oliva and confirmed the facility can accept the patient Thursday afternoon.     NADEEM updated the primary care team.     [id number]    Next Steps: Confirm plan for discharge on Thursday and send discharge orders to the facility.  IMM needed at the time of discharge.    NAINA Hill  [address]   Office: [phone]  [email or web address]  Securely message with [employer] (Search Name or 7C Med Surg [phone] )  Text page via [facility] Paging/Directory   See signed in provider for up to date coverage information  Social Work  & Care Management Department

## 2025-06-10 NOTE — PLAN OF CARE
[id number]. A&Ox4. Koi. VSS on RA. Denied pain. Ax2 w/ lift. Up to recliner this afternoon. Colostomy leaking this AM. Bag changed and belt applied - no leaking this afternoon. Primafit in place w/ adequate UO. Abdominal incision and jyoti intact. Reg diet, fair appetite. Drinking glucerna shakes. Phos replaced - recheck tomorrow AM. R PIV saline locked. Plan to discharge to TCU Thursday 6/12.     Goal Outcome Evaluation:      Plan of Care Reviewed With: patient    Overall Patient Progress: no change

## 2025-06-10 NOTE — TELEPHONE ENCOUNTER
Left Voicemail (1st Attempt) and Sent Mychart (1st Attempt) for the patient to call back and reschedule the following:    Appointment type: Return Nephrology  Provider(s): Dr. Maria Ines Otero   Date: 7/23/25  Specialty phone number: [phone]    Additonal Notes: Please also reschedule corresponding lab. (Lab 1-hour prior to clinic visit (or 1-week if video visit))    Attempted to reach patient twice; both times phone was disconnected. Unable to leave [facility].

## 2025-06-10 NOTE — PROGRESS NOTES
[facility]    Progress Note - Colorectal Surgery Service       Date of Admission:  5/28/2025    Assessment & Plan: Surgery   Mrs. Thompson is a 75 year old female with a complex PMH, some of which includes CKD stage 3, CVA and DVT on anticoag, atrial fibrillation, DM2, biliary cirrhosis s/p prior liver transplant admitted for smoldering diverticulitis with 4.1cm abscess on prolonged IV abx as an outpatient.  Was admitted on 5/29 with worsening abdominal pain, hypotension due to gram negative septicemia, diarrhea.  Now s/p exploratory laparotomy, sigmoidectomy and end colostomy on 5/30/25, recovering appropriately with return of bowel function.    Polymicrobial bacteremia likely 2/2 GI source. Having return of bowel function. CT A/P on 6/6 without intra-abd fluid collections nor acute intra-abd pathology. Leukocytosis downtrending.     - Reg diet.  Recommend protein nutritional supplements as able.    - removed PATY drain on 6/9/25, continue staples   - restarted eliquis on 6/4  - Appreciate ID recommendations (now on Zebraxa as well as Lineazolid)  - Hx of liver transplant - immunosuppression per transplant immunology team (changed from sirolimus to tac).  On IV hydrocort taper for stress dosing.   - High dose Vitamin A x30 days post-op for wound healing support in setting of immunosuppression  - Getting senokot BID  - Continue WOC ostomy cares   - Encourage out of bed and ambulation, incentive spirometer use      Diet: Snacks/Supplements Adult: Other; PRN per pt request; With Meals  Snacks/Supplements Adult: Other; Offer pt Gelatein Plus or 20 TID with meals - please ask what flavor she prefers with each meal; With Meals  Room Service  Advance Diet as Tolerated: Regular Diet Adult; Regular Diet Adult  Snacks/Supplements Adult: Glucerna; Between Meals    DVT Prophylaxis: SQH  Ron Catheter: Not present  Lines: None       Drains: PRESENT         - 1 Closed/Suction  "Drain(s)    - 1 Ureteral Drain/Stent(s)   Cardiac Monitoring: None  Code Status: Full Code      Clinically Significant Risk Factors               # Hypoalbuminemia: Lowest albumin = 2.4 g/dL at 5/29/2025  3:15 PM, will monitor as appropriate       # Hypertension: Noted on problem list            # Overweight: Estimated body mass index is 28.97 kg/m  as calculated from the following:    Height as of this encounter: 1.702 m (5' 7\").    Weight as of this encounter: 83.9 kg (184 lb 15.5 oz).        # Financial/Environmental Concerns: none         Social Drivers of Health   Tobacco Use: Medium Risk (5/27/2025)    Patient History     Smoking Tobacco Use: Former     Smokeless Tobacco Use: Never   Physical Activity: Insufficiently Active (11/9/2023)    Received from [employer]    Exercise Vital Sign     Days of Exercise per Week: 5 days     Minutes of Exercise per Session: 10 min         Disposition Plan        Patient was seen with colorectal fellow, Dr. Montano, and discussed with staff surgeon, Dr. Campbell.     Zoë Silva MD  General Surgery Resident  [phone]     Non-urgent messages: Securely message with [employer] (more info)  Text page via [employer] Paging/Directory     ______________________________________________________________________    Interval History   No acute events overnight. She does not have nausea/vomiting.     Physical Exam   Vital Signs: Temp: 98  F (36.7  C) Temp src: Oral BP: (!) 146/81 Pulse: 93   Resp: 18 SpO2: 95 % O2 Device: None (Room air)    Weight: 184 lbs 15.46 oz    Intake/Output Summary (Last 24 hours) at 6/10/2025 1320  Last data filed at 6/10/2025 1030  Gross per 24 hour   Intake 954 ml   Output 1350 ml   Net -396 ml      General Appearance: Alert and oriented, no acute distress  Respiratory: no increased work of breathing   Cardiovascular: regular rate   GI: soft, nondistended, non-tender to palpation around ostomy in LLQ, ostomy pink and well perfused with gas as well as thickened " stool, incision clean/dry/intact with staples  Skin: no rashes    Data     I have personally reviewed the following data over the past 24 hrs:    10.6  \   7.4 (L)   / 270     139 106 49.4 (H) /  206 (H)   4.5 21 (L) 1.62 (H) \       Imaging results reviewed over the past 24 hrs:   No results found for this or any previous visit (from the past 24 hours).

## 2025-06-10 NOTE — PLAN OF CARE
Shift Hours: 1900 - 0700    Assessment:  Body systems assessments were at patient's baseline.        Activity     Fall Risk Score: 60   Bed alarm on? Yes     Activity Assistance Provided: assistance, 2 people      Assistive Device Utilized: gait belt    Pain: denies    Labs/RN Managed Protocols:Potassium, phosphorus and magnesium    Lines/Drains: PIV right arm, colostomy    Nutrition: regular as tolerated    Goal Outcome Evaluation  Plan of Care Reviewed With: patient  Overall Patient Progress: no change  Outcome Evaluation: Patient denies pain.  Having neuropathy pain in bilateral feet .  Gabepentin given with relief.  Moves well in bed with one assist.  Purewic in place.    Barriers to Discharge:   Plans to discharge to TCU today

## 2025-06-10 NOTE — PROGRESS NOTES
Transplant Infectious Diseases Inpatient Consultation      Luz Thompson MRN# [id number]   YOB: 1950 Age: 75 year old   Date of Admission and time: 5/28/2025 12:43 PM     Reason for consult: Polymicrobial bacteremia in an immunocompromised post-liver transplant patient, now s/p sigmoidectomy with end colostomy for diverticulitis with abscess.        Recommendations:     Discontinue antibiotics today given that she had good source control and significant clinical improvement.     Per primary, plan for discharge on Thursday 6/12/25 to TCU. While she is in TCU, Dr. Isidro will be able to address questions and concerns about her ID-related care.     3.   She has a follow up with Dr Lundberg on 7/02 at 4:00 PM.     Transplant infectious diseases will sign off.    Case discussed with the attending physician, Dr. Isidro.    Jose Ramon Lee MD  Infectious Diseases Fellow        Synopsis of Clinical Presentation and Course   Transplant Summary  Transplants:  5/22/2002 (Liver); POD  8420.  Coordinator Angelika Frausto  Reason for Transplantation: Liver cirrhosis   Current Immunosuppression:   Prednisone  Tacrolimus  Mycophenolate    Ms. 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 She underwent open sigmoidectomy with end colostomy on 5/30/25. Post-operatively, she developed polymicrobial bacteremia with CR-Pseudomonas aeruginosa, Klebsiella pneumoniae, and VRE faecium. All blood cultures cleared by 6/3/25. She has remained afebrile and clinically stable since.    Over the weekend, her WBC increased to 16 from a baseline of ~11, prompting a CT A/P to rule out intra-abdominal complications. Imaging showed only a small amount of non-inflammatory pelvic fluid. Drains were removed on 6/9. No changes in abdominal exam or new complaints reported. In this setting, an alternative  explanation for leukocytosis should be sought as with source control and extremely broad coverage she is receiving the likelihood of active or new infection is very low. She was treated with 7-day antibiotic course.        Active Problems and Infectious Diseases Issues:     Polymicrobial bacteremia (CR-Pseudomonas aeruginosa, Klebsiella pneumoniae, VRE):    Initial blood cultures on 5/28 grew CR-Pseudomonas and Klebsiella pneumoniae; subsequent cultures on 5/31 grew Enterococcus faecium (vancomycin-resistant). Blood cultures have remained negative since 6/3. The patient has been clinically stable, afebrile, and without signs of persistent bacteremia. She is currently receiving ceftolozane-tazobactam (MARYJANE <=2 for both Pseudomonas and Klebsiella) and oral linezolid (MARYJANE 2 for VRE). Given appropriate source control, lack of endocarditis on TTE, and stable response, she completed a 7-day course (6/3-6/9).    Post-operative status s/p open sigmoidectomy with end colostomy (5/30/25):    Performed for diverticulitis with pericolic abscess. Postoperative recovery has been generally uncomplicated. Colostomy is functional, and the surgical site shows no evidence of infection. A CT A/P on 6/6 was obtained for isolated leukocytosis (WBC 16) and showed only small-volume, non-inflammatory pelvic fluid; no drainable collections or abscesses were identified. Surgical drains were removed, and no abdominal symptoms are reported.    Immunocompromised host s/p liver transplantation (2002):    On maintenance immunosuppression with tacrolimus. No evidence of acute graft dysfunction. Tacrolimus levels have been stable and she remains off prednisone currently. History of EBV and Sjögren s syndrome may influence susceptibility to infection and medication tolerance.    Transplant Checklist  - QTc interval: 379 on 6/3/2025  - Pneumocystis prophylaxis: none   - Bacterial prophylaxis: none   - Fungal prophylaxis: none   - Serostatus & viral  prophylaxis: none   - Immunization status: Needs RSV, Prevenar-20, Tdap  - Gamma globulin status: IgG 1110 on 8/5/19        Old Problems and Infectious Diseases Issues:     Old Problems and Infectious Diseases Issues:    ESBL UTI and Enterococcus faecalis bacteremia (April 2025):  Treated with IV ertapenem during 4/14-4/23/2025 admission for presumed sigmoid microperforation. No recurrence since that time.    History of recurrent infections and extensive antibiotic allergies:  Patient has a complex allergy history including reported reactions to penicillins, cephalosporins, fluconazole, Bactrim, and multiple others. Intradermal skin testing in 2019 was negative for several agents including penicillins and cephalosporins, but reactions remain documented in chart.    EBV positivity, Sjögren s syndrome, and basal cell carcinoma:  Relevant for immunosuppressive management; most recent skin cancer treated with Mohs surgery on 4/8/2025         Subjective and Interval Events       Patient remains well today and denies abdominal pain, fever, chills, or other systemic symptoms. She is eating and tolerating oral medications. No stoma-related issues. No new drainage from the surgical site. She has been ambulatory during the day.              Medications:   Medications that Require Transfusion:   Current Facility-Administered Medications   Medication Dose Route Frequency Provider Last Rate Last Admin     Scheduled Medications:   Current Facility-Administered Medications   Medication Dose Route Frequency Provider Last Rate Last Admin    [Held by provider] amLODIPine (NORVASC) tablet 5 mg  5 mg Oral Daily Tess Rodriguez MD   5 mg at 06/01/25 0741    apixaban ANTICOAGULANT (ELIQUIS) tablet 2.5 mg  2.5 mg Oral BID Negrito Christensen MD   2.5 mg at 06/09/25 2008    calcitRIOL (ROCALTROL) capsule 0.25 mcg  0.25 mcg Oral Daily Jah Bettencourt PA-C   0.25 mcg at 06/09/25 0844    estradiol (ESTRACE) cream 2 g  2 g Vaginal Once per day on  Monday Wednesday Friday Jah Bettencourt PA-C   2 g at 06/09/25 2014    ezetimibe (ZETIA) tablet 10 mg  10 mg Oral At Bedtime Jah Bettencourt PA-C   10 mg at 06/09/25 2223    ferrous sulfate (FEROSUL) tablet 325 mg  325 mg Oral At Bedtime Jah Bettencourt PA-C   325 mg at 06/07/25 2109    folic acid (FOLVITE) tablet 1,000 mcg  1,000 mcg Oral At Bedtime Jah Bettencourt PA-C   1,000 mcg at 06/09/25 2223    gabapentin (NEURONTIN) solution 300 mg  300 mg Oral At Bedtime Jah Bettencourt PA-C   300 mg at 06/10/25 0039    [Held by provider] hydrALAZINE (APRESOLINE) tablet 50 mg  50 mg Oral TID Jah Bettencourt PA-C        hydrocortisone sodium succinate PF (solu-CORTEF) injection 12.5 mg  12.5 mg Intravenous Q12H Negrito Christensen MD   12.5 mg at 06/09/25 2222    insulin aspart (NovoLOG) injection (RAPID ACTING)  1-10 Units Subcutaneous TID  Negrito Christensen MD   4 Units at 06/09/25 1756    insulin aspart (NovoLOG) injection (RAPID ACTING)  1-7 Units Subcutaneous At Bedtime Negrito Christensen MD   2 Units at 06/08/25 2317    levothyroxine (SYNTHROID/LEVOTHROID) tablet 175 mcg  175 mcg Oral QAM  Jah Bettencourt PA-C   175 mcg at 06/09/25 0844    linezolid (ZYVOX) tablet 600 mg  600 mg Oral Q12H [facility] (08/20) Maribel Landin MD   600 mg at 06/09/25 2007    melatonin tablet 1 mg  1 mg Oral At Bedtime Josefina Perea DO        [Held by provider] metoprolol tartrate (LOPRESSOR) half-tab 12.5 mg  12.5 mg Oral BID Josefina Perea DO   12.5 mg at 05/31/25 0757    multivitamin, therapeutic (THERA-VIT) tablet 1 tablet  1 tablet Oral Daily Josefina Perea DO   1 tablet at 06/09/25 0843    Nutrisource Fiber PO packet 1 each  1 packet Oral Daily Jah Bettencourt PA-C   1 each at 06/09/25 1256    nystatin (MYCOSTATIN) suspension 1,000,000 Units  1,000,000 Units Oral 4x Daily Jah Bettencourt PA-C   1,000,000 Units at 06/09/25 2009    omega-3 acid ethyl esters (LOVAZA) capsule 1 g  1 g Oral BID Tess Rodriguez MD   1 g at 06/09/25 2009     "[Held by provider] predniSONE (DELTASONE) half-tab 9 mg  9 mg Oral Daily Negrito Christensen MD        sennosides (SENOKOT) tablet 2 tablet  2 tablet Oral or Feeding Tube BID Norah Jaquez CNP   2 tablet at 06/09/25 0844    sertraline (ZOLOFT) tablet 50 mg  50 mg Oral Daily Jah Bettencourt PA-C   50 mg at 06/09/25 0844    sodium chloride (PF) 0.9% PF flush 3 mL  3 mL Intracatheter Q8H LUZMARIA Jah Bettencourt PA-C   3 mL at 06/09/25 2235    sodium phosphate 15 mmol in sodium chloride 0.9 % 250 mL intermittent infusion  15 mmol Intravenous Once Ofelia Patel MD        tacrolimus (PROGRAF BRAND) suspension 2 mg  2 mg Oral BID IS Luzma Osman APRN CNP   2 mg at 06/09/25 1756    ursodiol (ACTIGALL) tablet 250 mg  250 mg Oral BID Jah Bettencourt PA-C   250 mg at 06/09/25 2008    vitamin A 3 MG (75089 UNITS) capsule 20,000 Units  20,000 Units Oral Daily Al Campbell MD   20,000 Units at 06/09/25 0844          Physical Exam     Physical Exam     Vital signs:  BP (!) 146/81 (BP Location: Left arm)   Pulse 93   Temp 98  F (36.7  C) (Oral)   Resp 18   Ht 1.702 m (5' 7\")   Wt 83.9 kg (184 lb 15.5 oz)   LMP 06/01/1988 (Approximate)   SpO2 95%   BMI 28.97 kg/m      Afebrile, /59, HR 91, SpO2 100% on RA.  Alert, no acute distress.  Lungs: Mild crackles, no increased work of breathing.  Cardiac: Irregularly irregular rhythm, no murmurs.  Abdomen: Soft, NT/ND, healing incision without erythema or discharge.  Colostomy: Intact with functional output.  Neuro: Non-focal.    Data     Data   Laboratory data and imaging listed below was reviewed prior to this encounter.     WBC trend: 14.6 (6/6), up from 11.4 (6/5) and 11.5 (6/4)  CT A/P (6/6): Non-inflammatory pelvic fluid; no abscess or concerning findings  Blood cultures: All negative since 6/3  TTE: No vegetations             Jose Ramon Lee MD  [facility]  Contact information available via [facility] " Paging/Directory     06/10/2025

## 2025-06-10 NOTE — PROGRESS NOTES
[facility]  WO Nurse Inpatient Assessment     Consulted for: new end colostomy    Summary:   5/30 - Pt's daughter Gloria at bedside for pouch change demo, would like to be present for education  6/3 - daughter not here - stool present in pouch  6/4: daughter not at bedside- unable to make it to hospital today. Pouch intact. Pt too tired for education today. Pt due for pouch change Thursday.   6/5: met with daughter and pt at bedside, pt able to use coloplast pouch closure much better than the manny one, pt able to remove pouch with assist. Daughter able to complete the rest of the pouch change with prompting. Placed convex coloplast pouch today but not sure if this is the correct fit, ordered some flat coloplast pouches with convex oval ring to try for tomorrow, plan to meet with daughter at 2pm tomorrow for continued teaching.   6/6: pouch intact, new supplies not available in room, ordered flat coloplast pouch and convex oval rings again. Pt and daughter prefer coloplast for the closure.  6/9: MCJ separation increased and with depth to medial side - will use Aroda convex for now, can switch to Coloplast later if Pt will be emptying pouch independently. Discussed with colorectal provider at bedside  6/10: pouch leaking and WOC called to bedside. Pouch already removed. Per RN pouch leaking approx 3 and 9 o'clock. Will add large belt today. Per patient daughter will return for more education once stable POC is in place.     WO nurse follow-up plan: daily     Patient History (according to provider note(s):      75 year old female with history of liver transplant admitted for smoldering diverticulitis with 4.1cm abscess s/p open sigmoidectomy and end colostomy on 5/29/25,     Assessment:      Areas visualized during today's visit: Focused: Abdomen      6/2 LLQ      6/9      Assessment of new end Colostomy:  Diagnosis Pertinent to Stoma: Diverticulitis with  perforation      Surgery Date: 5/29/25  Surgeon: Al Campbell MD       Hospital: [facility]  Pouching system in place on assessment today: [place] two piece, cut to fit, convex, ostomy ring, skin prep, and Hydrofera Blue Classic barrier ring.   Pouch last changed/wear time: <1 days  Reason for pouch change today: ostomy education and routine schedule  Effectiveness of current pouching/ supply plan: Attempting new system, will re-evaluate next assessment  Change made with ostomy management today: Yes  Pouching system placed today: [place] two piece, cut to fit, convex, ostomy ring, skin prep, and Hydrofera Blue Classic barrier ring.   Supplies: ordered [place] pouches and convex barriers, Hydrofera Blue Classic    Last photo: 6/9/25    Stoma location: LLQ  Stoma size: 1 1/2 inches,   Stoma appearance: red, round, moist, retracted, and necrotic 11 -1 o'clock  Mucocutaneous junction:   circumferentially - irregular shape area with depth from 6-11 o'clock, approx 2 x 1.5 x 1 cm deep  Peristomal complication(s): none   Output: soft and brown stool, gas  Output volume emptied during visit: 100ml    Abdominal assessment: Soft  Surgical site(s): dressing dry and intact  NG still in place? No  Pain: denies  Is patient still on a PCA? No    Ostomy education assessment:  Participant of teaching session today: patient  and family member Gloria  Education completed today: Stoma assessment, Pouching system assessment , Adjustment of pouching plan, Ostomy accessory product use , Pouch emptying return demonstration, When to seek medical attention, and Lifestyle adjustments   Educational materials/methods: Verbal, Demonstration, Return demonstration, and Addressed patient/caregiver questions/concerns  Education still needed: Pouch change return demonstration and Discharge instructions-- additional pouch change practice for daughter (plan is for TCU)  Learning Comprehension:   Psychosocial assessment: Pt says she wants to  be able to change pouch after discharge - wants to return to independent living apartment with some assistance (was in TCU prior to this admission and plan is for TCU this discharge). Daughter can assist if needed, wants to be present for teaching  Patient readiness for education today: attentive and active participation,   Following today's visit: patient  and family member daughter is able to demonstrate;         1. How to empty their pouch? Yes, with moderate assist, and Needs additional practice         2. How to change their pouch? Yes, with moderate assist, and Needs additional practice          Preparation for discharge completed: No discharge preparation started yet-- still adjusting pouching plan   Preparation for discharge still needed: Placed prescription recommendations in discharge navigator for MD to sign, Ensured patient has extra supplies for discharge, Discussed how to order supplies after discharge , Ordered samples from  after gaining consent from patient/caregiver, Discussed how and when to make an outpatient WOC nurse appointment after discharge, Prepared for discharge home with home care, and Discuss signs/symptoms of when to seek medical attention  Pt support system on discharge: Daughter Gloria  [facility] recommend home care? By WOC RN if possible  Face to face time: 20 minutes    Wound location: peristomal - medial side      Last photo: 6/9/25  Wound due to: MCJ separation  Wound history/plan of care: early MCJ separation and small areas of necrosis - more significant wound noted 6/9   Wound base: 50 % Adipose tissue, 25 % Slough, Yellow, and Stringy brown, 25% red non granular     Palpation of the wound bed: normal      Drainage: moderate     Description of drainage: serous     Measurements (length x width x depth, in cm): irregular shape 2  x 1.5  x  1 cm      Tunneling: N/A     Undermining: N/A  Periwound skin: Intact      Color: normal and consistent with surrounding tissue       "Temperature: normal   Odor: none  Pain: mild and facial expression of distress, tender with cleaning and measurements  Pain interventions prior to dressing change: slow and gentle cares  and distraction  Treatment goal: Infection control/prevention, Increase granulation, Protection, Remove necrotic tissue, and successful ostomy pouching  STATUS: initial assessment  Supplies ordered: ordered Hydrofera Blue Classic    Treatment Plan:     Peristomal wound: With pouch changes - clean open wound to medial side of stoma with wound spray. Cut piece of Hydrofera Blue Classic to size of open area. Wet Hydrofera Blue (#[id number]) with saline and squeeze out excess, place in wound base prior to applying ostomy pouch.    LLQ Colostomy pouching plan:   See wound care order for peristomal wound. WOC to assess and do pouch change daily as able.  Pouching system: ostomy supplies pouches: [place] 57 FECAL ([id number]) ostomy supplies barrier: [place] 57mm SOFT CONVEX ([id number])  Accessories used: WOC ostomy accessories: Hydrofera Blue Classic to fill open area on medial side of stoma (MCJ separation), 2\" Shireen Ring ([id number]) and Cavilon no sting barrier film ([id number]) Large Belt (151996)   Frequency of pouch changes: Daily due to MCJ separation   WOC follow up plan: Daily Monday-Friday (as able)  Bedside RN interventions: Change pouch PRN if leaking using the supplies above, Empty pouch when 1/3 to 1/2 full, ensure to clean pouch outlet after emptying to prevent odor, Notify WOC for ongoing pouch leakage, Document stoma appearance and output volume, color, and consistency every shift, and Encourage patient to empty pouch with assist     Orders: reviewed and updated     DATA:     Current support surface: Standard    Containment of urine/stool: Suction based external urinary catheter  and Colostomy pouch  BMI: Body mass index is 28.28 kg/m .   Active diet order: Orders Placed This Encounter      Clear Liquid Diet     Output: I/O last 3 " completed shifts:  In: 1756.64 [P.O.:270; I.V.:1436.64; IV Piggyback:50]  Out: 1680 [Urine:1250; Drains:430]     Labs:   Recent Labs   Lab 05/30/25  0412 05/29/25  1515 05/29/25  1051 05/29/25  0546   ALBUMIN  --  2.4*  --   --    HGB 8.6* 8.5*   < > 7.6*   INR  --   --   --  1.49*   WBC 20.2* 20.2*  --  9.1   A1C  --  6.3*  --   --     < > = values in this interval not displayed.     Pressure injury risk assessment:   Sensory Perception: 2-->very limited  Moisture: 3-->occasionally moist  Activity: 1-->bedfast  Mobility: 2-->very limited  Nutrition: 2-->probably inadequate  Friction and Shear: 2-->potential problem  Shashank Score: 12    Gayatri Jackson RN CWOCN   Pager no longer is use, please contact through Zachariah   [place] group: [facility] Nurse [place]   Dept. Office Number: [phone]

## 2025-06-10 NOTE — PROGRESS NOTES
[facility]    Progress Note - Medicine Service, JOVANNY TEAM 3       Date of Admission:  5/28/2025    Assessment & Plan   Luz Thompson is a 75 year old female who was admitted on 5/28/2025 with past medical history of atrial fibrillation, DVT on eliquis, CVA, CKD III, HFpEF (TTE 3/9/25 EF 60-65%), T2DM, biliary cirrhosis s/p liver transplant in 2002, EBV, HTN, HLD, Sjogren's syndrome, Basal Cell Carincoma s/p MOHS on 4/8/25, thyroid cancer s/p thyroidectomy in 2010, Sjogren's syndrome, and diverticulitis with recent admission from 4/14-4/23 for ESBL UTI & E. Faecalis bacteremia related to sigmoid microperforation (treated with IV ertapenem) who is admitted for abdominal pain and hypotension, found to have smoldering diverticulitis with 4.1cm abscess s/p open sigmoidectomy and end colostomy on 5/30/25, with Psuedomonas positive blood cultures drawn 5/28.  Ongoing IV antibiotics due to bacteremia (GPC and VRE) and weaning off stress dose steroids.     Today:  - No major changes   - Hydrocortisone 12.5mg q12 hours today and transition to Prednisone tomorrow (6/11)  - Discharge to TCU Thursday      # Diverticulitis c/b abscess s/p open sigmoidectomy with end colostomy 5/30/25  # Sepsis 2/2 Klebsiella pneumoniae and Pseudomonas aeruginosa bacteremia 5/28, likely of intra-abdominal origin   # Hypotension 2/2 sepsis  Patient now hypertensive, after coming off pressors on 5/30. Currently with ongoing IV steroid taper, will plan to stop continuous maintenance fluids. Will hold PTA antihypertensives while titrating and resume when able.  Transitioned from meropenem to ceftolozane-tazobactam due to resistance (psuedomonas coverage) and switch vancomycin to linezolid iso DERREK for coverage of previous VRE E Facecium.  From ID perspective, there is no good oral antibiotic options and patient will complete 7-day IV antibiotic course at the hospital (first day of blood culture  clearance 6/3). New cloudy PATY drain output on 6/6. Ongoing leukocytosis but no new localizing symptoms so will continue to monitor.   -CT A/P negative abscess/fluid collection   - CRS consulted              - ADAT  - subcutaneous heparin for DVT ppx, continue to hold eliquis for now  - Hx of liver transplant - immunosuppression per transplant immunology team   - High dose Vitamin A x30 days post-op for wound healing support in setting of immunosuppression  - senokot BID  - Continue WOC ostomy cares   - Removed PATY drain   - Encourage out of bed and ambulation, incentive spirometer use   - Transplant ID following  - meropenem (5/31- 6/3) for pseudomonas coverage  - linezolid (6/1 -6/3)  for enterococcal bacteremia  - Ceftolozane-Tazobactam (6/3-6/9), for a 7-day course   - Pain regimen:              - Scheduled: Robaxin QID, tylenol 975mg BID, gabapentin 300mg QHS, Lidocaine patches daily, Diclofenac QID  -  PRN: PO oxycodone 5mg  - Blood cultures drawn 5/28 positive for Klebsiella and Pseudomonas  - Bcx (5/31): E Faecium VRE  - Bcx (6/2): GPC in pairs and chains   - Bcx (6/3): NGTD   - Space Hydrocortisone 12.5mg [id number], plan to transition to Prednisone 9 mg tomorrow (6/11) if BP stable   - If decreasing back to IS dosing, would pause on pred 10 until tac level therapeutic (goal 3-5)   - hold PTA amlodipine while tapering fluids and IV steroids    # Acute blood loss anemia    # Anemia of critical illness  # coagulopathy due to cirrhosis and surgical blood loss    # thrombocytopenia   # anemia of critical illness   No overt bleeding and ostomy output/vitals stable.   - Transfuse if hgb <7.0 or signs/symptoms of hypoperfusion. Monitor and trend.   - PTA Ferous sulfate, folic acid  - Transfused 1 unit pRBC (6/7)     #C/f TIA   In the morning of 6/4, patient states that she has had speech problems in the last couple of days since the surgery.  She describes that she has trouble getting her words out and that her speech  does not feel as fluent as it was previously.  She denies any new numbness or tingling.  No headache or changes in vision.  Nonfocal on exam.  CT head negative for intracranial bleed or other acute process.  TTE with bubble also negative.  Certainly possible that patient may have had a TIA following surgery as anticoagulation had been held.  Will proceed with MRI brain negative for acute infarct.    - Continue PTA Xarelto     # DERREK on CKD3, resolved   # Bilateral stent placement per urology 5/29  # ATN  DERREK in the setting of hypotension, shock, nephrotoxic meds which is now resolved after IVF and improved PO after surgery.   - Baseline creatinine 1.3-1.6  - external catheter   - PTA estradiol vaginal  cream     # Primary biliary cirrhosis s/p liver transplant 2002  # EBV +  # Reactive Leukocytosis   # Sjogren's syndrome  - Transplant Hepatology consulted  - No sirolimus  - Resumed PTA Prednisone 9 mg PO   - Tac 1 mg po BID per transplant hep              - ordered suspension formulation instead of capsule due to gelatin allergy  - CMP in AM  - PTA Nystatin QID  - PTA Ursodiol      # Acute post-op pain  # high risk delirium   - delirium precaution   - Monitor neurological status. Delirium preventions and precautions.   - pain regimen as above  - PTA sertraline  - melatonin Q HS  - PRN narcan     # Protein calorie deficit malnutrition    - Nutrition consulted  - Supplements: Gelatin Plus TID with meals + PRN per patient request  - Thera-Vit 1 tablet daily   - Vitamin A level (ordered)     # Post operative ventilatory support, resolved  # Acute hypoxic respiratory support   Currently on 1L NC.  - Supplemental oxygen to keep saturation above 92 %.  - PRN albuterol      # Paroxysmal A-fib on apixaban   # Hypertension  # HFpEF (TTE 3/9/25 EF 60-65%)   # history of CVA  # Vasoplagia  - Monitor hemodynamic status.  - Levophed off since 0400 5/31  - MAP goal >65  - PTA ezetimibe  - holding PTA amlodipine 5mg BID  - Continue  "PTA apixaban 2.5mg BID  - Holding metoprolol tartrate 12.5 mg BID  - holding hydralazine, when reordering would prefer 2 25 mg tabs   - SBP >100 or MAP >65  - weaning steroids as above      # Type 2 diabetes  # Stress hyperglycemia    - MDSSI  - Goal to keep BG< 180 for optimal wound healing   - Glucose AC/HS  - hypoglycemic protocol  - On prednisone, transitioned from hydrocortisone      # Thyroid cancer status post thyroidectomy 2010  - PTA calcitriol 0.25mcg  - PTA levothyroxine 175mcg     # Weakness and deconditioning of critical illness   - Physical and occupational therapy consult   - up with assist, fall precaution      # Basal cell carcinoma status post Mohs April 2025            Diet: Snacks/Supplements Adult: Other; PRN per pt request; With Meals  Snacks/Supplements Adult: Other; Offer pt Gelatein Plus or 20 TID with meals - please ask what flavor she prefers with each meal; With Meals  Room Service  Advance Diet as Tolerated: Regular Diet Adult; Regular Diet Adult  Snacks/Supplements Adult: Glucerna; Between Meals    DVT Prophylaxis:Eliquis  Ron Catheter: Not present  Fluids: PO  Lines: None     Cardiac Monitoring: None  Code Status: Full Code      Clinically Significant Risk Factors               # Hypoalbuminemia: Lowest albumin = 2.4 g/dL at 5/29/2025  3:15 PM, will monitor as appropriate     # Hypertension: Noted on problem list            # Overweight: Estimated body mass index is 28.97 kg/m  as calculated from the following:    Height as of this encounter: 1.702 m (5' 7\").    Weight as of this encounter: 83.9 kg (184 lb 15.5 oz).        # Financial/Environmental Concerns: none          Disposition Plan     Medically Ready for Discharge: Anticipated in 2-4 Days         The patient's care was discussed with the Attending Physician, Dr. Patel and Patient.    Negrito Christensen MD  Medicine Service, [facility] TEAM 3  [facility]  Securely message with Zachariah (more " info)  Text page via [facility] Paging/Directory   See signed in provider for up to date coverage information  ______________________________________________________________________    Interval History   No acute events overnight.  Doing well overall and has no new pain or complaints. Informed that [facility] has accepted her and will be taking her Thursday.     Physical Exam   Vital Signs: Temp: 98  F (36.7  C) Temp src: Oral BP: (!) 146/81 Pulse: 93   Resp: 18 SpO2: 95 % O2 Device: None (Room air)    Weight: 184 lbs 15.46 oz    Gen: Comfortable appearing older woman lying in chair by the bed in no distress  HEENT: Atraumatic, nonicteric sclera  CV: RRR, limbs well perfused. No accessory heart sounds.   Pulm: Clear to auscultation in all fields, no wheezing or crackles  Abd: Nontender, nondistended abdomen. Ostomy output stable, no change in characteristic. PATY drain no longer in place  Skin: No lesions on exposed skin  Neuro: Appears alert and is appropriately conversational, moving all limbs spontaneously  Psych: Appropriate to the situation      Medical Decision Making       Please see A&P for additional details of medical decision making.      Data     I have personally reviewed the following data over the past 24 hrs:    10.6  \   7.4 (L)   / 270     139 106 49.4 (H) /  227 (H)   4.5 21 (L) 1.62 (H) \       Imaging results reviewed over the past 24 hrs:   No results found for this or any previous visit (from the past 24 hours).

## 2025-06-10 NOTE — DISCHARGE SUMMARY
"[facility]  Discharge Summary - Medicine & Pediatrics       Date of Admission:  5/28/2025  Date of Discharge:  6/12/2025  Discharging Provider: Dr. Ofelia Patel  Discharge Service: Medicine Service, JOVANNY TEAM 3    Discharge Diagnoses   # Diverticulitis complicated by abscess status post open sigmoidectomy with end colostomy 5/30/25  # Sepsis due to Klebsiella pneumoniae, Pseudomonas aeruginosa, VRE bacteremia  # Hypotension 2/2 Adrenal insufficiency/sepsis  # Acute blood loss anemia    # Acute kidney injury on CKD3, resolved   # Paroxysmal A-fib on apixaban   # Hypertension  # HFpEF (TTE 3/9/25 EF 60-65%)   # history of CVA  # Vasoplegia  # Concern for TIA    Clinically Significant Risk Factors     # Overweight: Estimated body mass index is 28.97 kg/m  as calculated from the following:    Height as of this encounter: 1.702 m (5' 7\").    Weight as of this encounter: 83.9 kg (184 lb 15.5 oz).       Follow-ups Needed After Discharge   - Colorectal surgery  - Hepatology  - Cardiology  -- Infectious Disease   - Primary care      Unresulted Labs Ordered in the Past 30 Days of this Admission       Date and Time Order Name Status Description    5/29/2025 10:31 AM Prepare red blood cells (unit) Preliminary     5/29/2025 10:31 AM Prepare red blood cells (unit) Preliminary             Discharge Disposition   Discharged to TCU  Condition at discharge: Stable    Hospital Course   Luz Thompson is a 75 year old female who was admitted on 5/28/2025 with a past medical history of atrial fibrillation, DVT on eliquis, CVA, CKD III, HFpEF (TTE 3/9/25 EF 60-65%), T2DM, biliary cirrhosis s/p liver transplant in 2002, EBV, HTN, HLD, Sjogren's syndrome, Basal Cell Carincoma s/p MOHS on 4/8/25, thyroid cancer s/p thyroidectomy in 2010, and diverticulitis with recent admission from 4/14-4/23 for ESBL UTI & E. Faecalis bacteremia related to sigmoid microperforation who was admitted for " abdominal pain and hypotension, found to have smoldering diverticulitis with a 4.1cm abscess status post open sigmoidectomy and end colostomy on 5/30/25, with Pseudomonas VRE bacteremia requiring IV antibiotics. She required stress-dose steroids throughout her hospital course and was eventually weaned to PTA Prednisone.  The following problems were addressed during her hospitalization:    # Diverticulitis complicated by abscess status post open sigmoidectomy with end colostomy 5/30/25  # Sepsis due to Klebsiella pneumoniae and Pseudomonas aeruginosa and VRE bacteremia, likely of intra-abdominal origin   # Hypotension due to adrenal insufficiency/sepsis   Required ICU admission due to septic shock secondary to diverticulitis complicated by abscess which required sigmoidectomy and colostomy.  Several IV antibiotics throughout hospitalization with ID following due to pseudomonas and VRE bacteremia . Repeat CT A/P a week after surgery negative for abscess or fluid collection. She also required stress-dose steroids which she was able to eventually wean off to PTA prednisone. No abx required at the time of discharge.   - Abx course:   - Meropenem (5/31- 6/3)   - Linezolid (6/1 -6/3)    - Ceftolozane-Tazobactam (6/3-6/9)  - Infectious disease follow-up 7/2  - Colorectal surgery follow-up 6/18   - Ok to resume PTA Apixaban    - High dose Vitamin A x 30 days post-op (through 6/30)   - Regular diet    - PATY drain removed   - Ostomy supplies provided at discharge and  care instruction included  - Pain regimen: Robaxin QID, tylenol 975mg BID, gabapentin 300mg QHS, Lidocaine patches daily, Diclofenac QID    # Primary biliary cirrhosis s/p liver transplant 2002  # EBV +  # Reactive Leukocytosis   # Sjogren's syndrome  Transplant Hepatology was consulted and advised against sirolimus. PTA prednisone 9mg was resumed after weaning off IV hydrocortisone. Discharging with the following immunosuppression.   - Transplant Hepatology  follow-up 8/22    - Tacrolimus 2 mg BID suspension (allergy to capsule protein)   - Prednisone 9 mg daily     # Acute blood loss anemia , resolved   # Anemia of critical illness, resolved   # coagulopathy due to cirrhosis and surgical blood loss    # thrombocytopenia   No signs of overt bleeding and her ostomy output stable. Continued ferous sulfate and folic acid inpatient. She received 1 unit of packed red blood cells on 6/7. Stable at the time of discharge.   - Continue to monitor      #Concern for transient ischemic attack  In the morning of 6/4, she stated that she had speech problems in the last couple of days since the surgery. She described that she has trouble getting her words out and that her speech did not feel as fluent as it was previously.  She denied any new numbness, tingling, headache or changes in vision. CT head was negative for intracranial bleed or other acute process and TTE with bubble study was also negative. It is possible that she may have had a TIA following surgery as anticoagulation had been held. MRI brain was negative for acute ischemic changes and her speech has returned to baseline.   - Continue PTA Xarelto   - No need for neuro follow-up      # Acute kidney injury on CKD3, resolved   # Bilateral stent placement per urology 5/29  # Acute tubular necrosis  Her baseline creatinine is 1.3-1.6. She had an DERREK in the setting of hypotension, shock, and nephrotoxic meds which resolved after receiving IV fluids and improved PO after surgery.   - Continue to monitor      # Protein calorie deficit malnutrition    Nutrition was consulted and suggested supplements of Gelatin Plus three times per day with meals and Thera-Vit 1 tablet daily      # Paroxysmal A-fib on apixaban   # Hypertension  # HFpEF (TTE 3/9/25 EF 60-65%)   # history of CVA  # Vasoplagia  - Continue ezetimibe and Apixaban (had been held pre and kayla-op)  - Hold PTA Amlodipine, resume when SBP consistently > 140  - Hold PTA  Hydralazine, resume when SBP consistently > 140  - Hold Metoprolol Tartrate, resume when SBP consistently > 140     # Type 2 diabetes  # Stress hyperglycemia    Had been on sliding scale while inpatient.      # Thyroid cancer status post thyroidectomy 2010  Continued her PTA calcitriol and levothyroxine.     # Weakness and deconditioning of critical illness   PT and OT while inpatient. Continue while at TCU.     Consultations This Hospital Stay   WOUND OSTOMY CONTINENCE NURSE  IP CONSULT  INFECTIOUS DISEASE GENERAL ADULT IP CONSULT  INFECTIOUS DISEASE TRANSPLANT SOT ADULT IP CONSULT  GI HEPATOLOGY ADULT IP CONSULT  UROLOGY IP CONSULT  OB GYN IP CONSULT  COLORECTAL SURGERY ADULT IP CONSULT  CARE MANAGEMENT / SOCIAL WORK IP CONSULT  PHARMACY TO DOSE VANCO  CARE MANAGEMENT / SOCIAL WORK IP CONSULT  OCCUPATIONAL THERAPY ADULT IP CONSULT  PHARMACY IP CONSULT  NUTRITION SERVICES ADULT IP CONSULT  NURSING TO CONSULT FOR VASCULAR ACCESS CARE IP CONSULT  NURSING TO CONSULT FOR VASCULAR ACCESS CARE IP CONSULT  PHYSICAL THERAPY ADULT IP CONSULT  NURSING TO CONSULT FOR VASCULAR ACCESS CARE IP CONSULT  INFECTIOUS DISEASE GENERAL ADULT IP CONSULT  INFECTIOUS DISEASE TRANSPLANT SOT ADULT IP CONSULT  NURSING TO CONSULT FOR VASCULAR ACCESS CARE IP CONSULT  NURSING TO CONSULT FOR VASCULAR ACCESS CARE IP CONSULT    Code Status   Full Code       The patient was discussed with Dr. Ofelia Christensen MD  Internal Medicine PGY-1   [facility] 7C MED SURG  [address] [place] [address]  Phone: [phone]  ______________________________________________________________________    Physical Exam   Vital Signs: Temp: 97.3  F (36.3  C) Temp src: Oral BP: 136/84 Pulse: 72   Resp: 16 SpO2: 97 % O2 Device: None (Room air)    Weight: 184 lbs 15.46 oz  General Appearance: Alert and oriented, nontoxic-appearing  Respiratory: Clear to auscultation bilaterally on room air  Cardiovascular: RRR, S1-S2 normal,  no murmurs or gallops  GI: Soft, nontender, nondistended, ostomy bag in place draining appropriately  Skin: No rashes or lesions      Primary Care Physician   Daniel Hidalgo    Discharge Orders       Significant Results and Procedures   Results for orders placed or performed during the hospital encounter of 05/28/25   CT Abdomen Pelvis w/o Contrast    Narrative    EXAM: CT ABDOMEN PELVIS W/O CONTRAST  LOCATION: [facility]  DATE: 5/28/2025    INDICATION: abd pain , diarrhea, known abscess  COMPARISON: 5/22/2025  TECHNIQUE: CT scan of the abdomen and pelvis was performed without IV contrast. Multiplanar reformats were obtained. Dose reduction techniques were used.  CONTRAST: None.    FINDINGS:   LOWER CHEST: Small right pleural effusion, decreased compared to prior. Left pleural effusion has resolved. At least moderate coronary artery calcifications. Unchanged groundglass and tree-in-bud opacities in the right lower lobe, with some associated   calcifications.     HEPATOBILIARY: Prior hepatic transplant. Pneumobilia and an area of biliary dilation in the segment 5, both unchanged.    PANCREAS: Fatty infiltration without ductal dilatation. 1.5 cm possible IPMN in the uncinate process (5/91) is unchanged.    SPLEEN: Unremarkable    ADRENAL GLANDS: Unremarkable    KIDNEYS/BLADDER: No significant mass, stones, or hydronephrosis. There are simple or benign cysts. No follow up is needed. Punctate nonobstructing intrarenal calculus in the left kidney.    BOWEL: Multiple colonic diverticula. No acute inflammation or obstruction. Decreasing size of pericolonic fluid collection, now 1.4 cm, previously 1.6 cm, prior to that 2.5 cm.    LYMPH NODES: No adenopathy    VASCULATURE: Atherosclerotic disease without abdominal aortic aneurysm.    PELVIC ORGANS: Atrophic uterus.     MUSCULOSKELETAL: Diffuse osteopenia. Multilevel spondylosis. There are several small to moderate-sized  fat-containing ventral hernias.      Impression    IMPRESSION:   1.  No acute findings in the abdomen or pelvis.  2.  Decreasing size of pericolonic fluid collection.  3.  Small right pleural effusion, decreased compared to prior. Left pleural effusion has resolved.  4.  Unchanged groundglass and tree-in-bud opacities in the right lower lobe.       XR Chest 2 Views    Narrative    EXAM: XR CHEST 2 VIEWS  LOCATION: [facility]  DATE: 5/28/2025    INDICATION: Eval for possible PICC position. Pt reports it is a midline but records indicate PICC placement. She was receiving outpatient antibiotic infusions.  COMPARISON: 5/27/2025      Impression    IMPRESSION:     No venous catheter visualized.    Electronic device projects over the left chest wall.    Right basilar opacities, corresponding with airspace opacities and calcifications seen on CT. Otherwise, no focal airspace disease.    Trace bilateral pleural effusions. No pneumothorax.    Cardiomediastinal silhouette is unremarkable. Aortic calcifications.   US Pelvic Limited    Narrative    EXAM: US PELVIC LIMITED  LOCATION: [facility]  DATE: 5/28/2025    INDICATION: Post menopausal bleeding  COMPARISON: CT abdomen pelvis 5/20/2025.  TECHNIQUE: Transabdominal scans were performed. Transvaginal ultrasound was attempted but patient was unable to tolerate.    FINDINGS:    Adequately distended bladder. The uterus and ovaries were not visible transabdominally.       Impression    IMPRESSION:  1.  Nondiagnostic exam.       CT Abdomen Pelvis w/o Contrast    Narrative    EXAM: CT ABDOMEN PELVIS W/O CONTRAST  LOCATION: [facility]  DATE: 5/29/2025    INDICATION: hypotension, drop in Hgb. Possible surgical intervention today sigmoidectomy  COMPARISON: Noncontrast CT abdomen and pelvis 05/22/2025 and 12/10/2024.  TECHNIQUE: CT scan of the abdomen  and pelvis was performed without IV contrast. Multiplanar reformats were obtained. Dose reduction techniques were used.  CONTRAST: None.    FINDINGS:   LOWER CHEST: Bibasilar opacities, left lower lobe more than right, possibly representing area of pneumonia. Some of the opacities are more chronic appearing and suggest scarring, however. Small bilateral simple density pleural effusions. Partially imaged   coronary artery disease in the LAD and RCA.    HEPATOBILIARY: Prior left hepatectomy with normal-appearing remnant right hepatic lobe.    PANCREAS: Atrophic with several likely pancreatic head cyst measuring up to approximately 1.5 cm probably IPMN.    SPLEEN: Atrophic.    ADRENAL GLANDS: Normal.    KIDNEYS/BLADDER: Atrophic. Benign appearing simple and hyperdense renal cysts. No follow-up needed. No hydronephrosis.    BOWEL: No gastric or bowel distention. Appendix not confidently identified immediately atrophic or surgically absent. Diverticulosis coli without acute diverticulitis. Minimal residual collapsed abscess cavity/scar abutting the mid sigmoid colon when   compared to imaging studies dating back to December 2024. No intraperitoneal fluid.    LYMPH NODES: No lymph nodes in the abdomen or pelvis.    VASCULATURE: The aortoiliac atherosclerosis without aneurysm.    PELVIC ORGANS: Atrophic uterus and adnexa.    MUSCULOSKELETAL: No acute or aggressive osseous abnormalities.      Impression    IMPRESSION:   1.  No hematoma or source of bleeding in the abdomen or pelvis.  2.  Diverticulosis coli without diverticulitis. Tiny residual scar/remnant collection from pericolonic abscess dating back to December 2024.  3.  Possible left lower lobe pneumonia.     CT Head w/o Contrast    Narrative    CT HEAD W/O CONTRAST 6/4/2025 12:45 PM    History: Changes in speech, check for acute intracranial pathology   ICD-10:    Comparison: CT and MRI from 2018    Technique: Using multidetector thin collimation helical  acquisition  technique, axial, coronal and sagittal CT images from the skull base  to the vertex were obtained without intravenous contrast.   (topogram) image(s) also obtained and reviewed.    Findings: There is no intracranial hemorrhage, mass effect, or midline  shift. Gray/white matter differentiation in both cerebral hemispheres  is preserved. Ventricles are proportionate to the cerebral sulci  patchy periventricular and subcortical white matter hypoattenuation is  nonspecific, but likely represents chronic small vessel ischemic  disease in patient this age. Mild generalized cerebral atrophy. The  basal cisterns are clear.    The bony calvaria and the bones of the skull base are normal. The  visualized portions of the paranasal sinuses and mastoid air cells are  clear.      Impression    Impression:    1. No acute intracranial pathology.  2. Moderate Leukoaraiosis and mild generalized cerebral atrophy.    I have personally reviewed the examination and initial interpretation  and I agree with the findings.    HO TRIMBLE MD         SYSTEM ID:  [id number]   CT Abdomen Pelvis w Contrast    Narrative    EXAMINATION: CT ABDOMEN PELVIS W CONTRAST, 6/6/2025 2:18 PM    INDICATION: concern from intra-abdominal infection in the setting of  bacteremia, increasing leukocytosis, cloudy PATY drain output post-op    COMPARISON STUDY: CT abdomen 5/29/2025    TECHNIQUE: CT scan of the abdomen and pelvis was performed on  multidetector CT scanner using volumetric acquisition technique and  images were reconstructed in multiple planes with variable thickness  and reviewed on dedicated workstations.     CONTRAST: 108cc of isovue 370 IV without oral contrast    CT scan radiation dose is optimized to minimum requisite dose using  automated dose modulation techniques.    FINDINGS:    Lower thorax: Bibasilar opacities are noted. Small bilateral pleural  effusion with adjacent atelectatic changes. Chronic calcifications in  the  right lower lobe. Atherosclerotic calcification in the partially  imaged coronary arteries.    Liver: Postsurgical changes of liver transplantation. No focal mass.  Mild intrahepatic biliary dilatation in the right lobe segment 5 is  unchanged from prior studies dating back to 3/8/2025. No focal hepatic  mass.    Biliary System: Cholecystectomy    Pancreas: Atrophic appearance of the pancreas with multiple pancreatic  head cysts, the largest measuring approximately 1.5 cm, likely  representing side-branch intraductal papillary mucinous neoplasms  (IPMNs) (series 4, image 182).     Adrenal glands: No mass or nodules    Spleen: Normal.    Kidneys: Bilateral renal atrophy . Multiple benign-appearing renal  cysts. No evidence of hydronephrosis.    Gastrointestinal tract: Normal caliber small bowel. No abnormal  gastric or bowel distention. The appendix is not visualized.  Postsurgical changes consistent with prior Izabella?s procedure, with  end colostomy. Surrounding fat stranding are present, likely  reflecting postoperative edema. No evidence of abscess, or  obstruction. Colonic diverticulosis without evidence of  diverticulitis. Drain is in place in the lower abdomen. Small  low-density fluid collection in bilateral paracolic gutters.    Pelvis: Urinary bladder is normal. No pelvic mass.    Mesentery/peritoneum/retroperitoneum: Small amount of fluid in the  pelvis and paracolic gutters. No free air.    Lymph nodes: No significant lymphadenopathy.    Vasculature: Diffuse aortoiliac atherosclerosis without aneurysmal  dilatation.    Soft tissues: Rectus abdominous muscle atrophy. Diffuse anasarca.  Small fat-containing ventral?hernias.     Osseous structures: No aggressive or acute osseous lesion. Diffuse  osteopenia. Multilevel thoracolumbar degenerative changes.      Impression    IMPRESSION:   1. 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  Mild fat stranding surrounding the descending colon  without wall  thickening is likely postoperative edema. There is small amount of  free fluid in the pelvis and paracolic gutters. No evidence of  organized fluid collection or abscess.    2. Colonic diverticulosis without acute diverticulitis.  3. Bilateral pleural effusions with adjacent atelectasis.  4. Postsurgical changes of liver transplantation with mild  intrahepatic biliary dilatation in the right lobe segment 5 is  unchanged from prior studies dating back to 3/8/2025.  5. Stable chronic incidental findings as described int eh body of the  report.    I have personally reviewed the examination and initial interpretation  and I agree with the findings.    KAYLIE BLAS MD         SYSTEM ID:  [id number]   MR Brain w/o Contrast    Narrative    MR BRAIN W/O CONTRAST 6/6/2025 6:28 PM    Provided History: Changes in speech, has history of stroke, evaluate  for any new evidence of stroke  ICD-10:    Comparison:  Brain MR 5/18/2018 and head CT 6/4/2025    Technique: Sagittal T1-weighted, coronal T2-weighted, axial T2 FLAIR,  axial susceptibility weighted, and axial and coronal  diffusion-weighted with ADC map images of the brain were obtained  without intravenous contrast.    Findings: No intracranial mass lesion, mass effect, midline shift, or  abnormal fluid collection. Moderate parenchymal volume loss. Diffuse  deep and subcortical white matter T2 FLAIR hyperintensities, likely  secondary to chronic ischemic vessel disease. The ventricles and sulci  are normal for age. No abnormality of reduced diffusion.  Normal  intravascular flow voids.      Impression    Impression:   1. No acute infarct.  2. Moderate to severe leukoaraiosis, unchanged and likely secondary to  chronic ischemic vessel disease.    I have personally reviewed the examination and initial interpretation  and I agree with the findings.    ALAN AZAR MD         SYSTEM ID:  [id number]   Echo Complete     Value    LVEF  60-65%    Narrative     [id number]  formerly [facility]  [id number]  [id number]^JOEL^SRAVANI^S     [facility]  Echocardiography Laboratory  [address] [place] [address]     Name: ISA CAMARGO  MRN: [id number]  : 1950  Study Date: 2025 10:58 AM  Age: 75 yrs  Gender: Female  [place] [facility] [place]  Reason For Study: Cerebrovascular Incident, Endocarditis  Ordering Physician: SRAVANI MALDONADO  Performed By: Sharath Frausto     BSA: 1.9 m2  Height: 67 in  Weight: 181 lb  BP: 153/67 mmHg  ______________________________________________________________________________  Procedure  Bubble Echocardiogram with two-dimensional, color and spectral Doppler.  ______________________________________________________________________________  Interpretation Summary  Global and regional left ventricular function is normal with an EF of 60-65%.  Global right ventricular function is normal.  Mild aortic insufficiency is present.  The atrial septum is intact as assessed by agitated saline bubble study .  This study was compared with the study from 3/10/25 .  No significant changes noted.  ______________________________________________________________________________  Left Ventricle  Left ventricular size is normal. Global and regional left ventricular function  is normal with an EF of 60-65%. Relative wall thickness is increased  consistent with concentric remodeling. Left ventricular diastolic function is  indeterminate.     Right Ventricle  The right ventricle is normal size. Global right ventricular function is  normal.     Atria  Both atria appear normal. The atrial septum is intact as assessed by agitated  saline bubble study .     Mitral Valve  Moderate mitral annular calcification is present. Mild mitral insufficiency is  present.     Aortic Valve  Aortic valve sclerosis is present. Mild aortic insufficiency is present.     Tricuspid Valve  The tricuspid valve is normal. Trace tricuspid insufficiency is  present. The  right ventricular systolic pressure is approximated at 29.4 mmHg plus the  right atrial pressure.     Pulmonic Valve  The pulmonic valve is normal. Trace pulmonic insufficiency is present.     Vessels  The inferior vena cava cannot be assessed. The aorta root is normal.     Pericardium  No pericardial effusion is present.     Compared to Previous Study  This study was compared with the study from 3/10/25 . No significant changes  noted.  ______________________________________________________________________________  MMode/2D Measurements & Calculations  IVSd: 1.1 cm  LVIDd: 3.7 cm  LVIDs: 2.7 cm  LVPWd: 0.96 cm  FS: 28.5 %  LV mass(C)d: 115.4 grams  LV mass(C)dI: 59.6 grams/m2  Ao root diam: 3.0 cm  LVOT diam: 1.8 cm  LVOT area: 2.7 cm2  Ao root diam index Ht(cm/m): 1.8  Ao root diam index BSA (cm/m2): 1.6  LA Volume (BP): 52.2 ml     LA Volume Index (BP): 26.9 ml/m2  RV Base: 2.8 cm  RWT: 0.52  TAPSE: 2.0 cm     Doppler Measurements & Calculations  MV E max nate: 121.0 cm/sec  MV A max nate: 59.7 cm/sec  MV E/A: 2.0  MV dec time: 0.17 sec  Ao V2 max: 142.0 cm/sec  Ao max P.1 mmHg  Ao V2 mean: 101.0 cm/sec  Ao mean P.0 mmHg  Ao V2 VTI: 23.0 cm  MAYUR(I,D): 1.6 cm2  MAYUR(V,D): 1.6 cm2  LV V1 max PG: 3.0 mmHg  LV V1 max: 87.0 cm/sec  LV V1 VTI: 14.1 cm  SV(LVOT): 37.6 ml  SI(LVOT): 19.4 ml/m2  PA V2 max: 98.8 cm/sec  PA max PG: 3.9 mmHg  PA acc time: 0.08 sec  TR max nate: 270.9 cm/sec  TR max P.4 mmHg  AV Nate Ratio (DI): 0.61  MAYUR Index (cm2/m2): 0.84  E/E' avg: 15.0     Lateral E/e': 13.3  Medial E/e': 16.8  RV S Nate: 11.5 cm/sec     ______________________________________________________________________________  Report approved by: Fermín Aragon MD on 2025 01:36 PM           *Note: Due to a large number of results and/or encounters for the requested time period, some results have not been displayed. A complete set of results can be found in Results Review.       Discharge Medications    Current Discharge Medication List        CONTINUE these medications which have NOT CHANGED    Details   acetaminophen (TYLENOL) 500 MG tablet Take 1,000 mg by mouth 2 times daily. During 4/16/25 [facility] pt states take BID everyday      amLODIPine (NORVASC) 5 MG tablet Take 1 tablet (5 mg) by mouth daily. HOLD if SBP<100  Qty: 30 tablet, Refills: 2    Associated Diagnoses: Benign essential hypertension      apixaban ANTICOAGULANT (ELIQUIS) 2.5 MG tablet Take 1 tablet (2.5 mg) by mouth 2 times daily.  Qty: 60 tablet, Refills: 0    Associated Diagnoses: PAF (paroxysmal atrial fibrillation) (H)      calcitRIOL (ROCALTROL) 0.25 MCG capsule Take 1 capsule (0.25 mcg) by mouth daily.  Qty: 90 capsule, Refills: 1    Associated Diagnoses: Papillary thyroid carcinoma (H)      calcium carbonate (TUMS) 500 MG chewable tablet Take 2 tablets (1,000 mg) by mouth every 4 hours as needed for heartburn.    Associated Diagnoses: Gastroesophageal reflux disease without esophagitis; Nausea      diclofenac (VOLTAREN) 1 % topical gel Apply 4 g topically 4 times daily.    Associated Diagnoses: Osteoarthritis of shoulder, unspecified laterality, unspecified osteoarthritis type      diphenhydrAMINE (BENADRYL) 25 MG tablet Take 25 mg by mouth every 6 hours as needed for itching or allergies.      estradiol (ESTRACE) 0.1 MG/GM vaginal cream Place vaginally three times a week.      ezetimibe (ZETIA) 10 MG tablet Take 1 tablet (10 mg) by mouth daily.  Qty: 90 tablet, Refills: 3    Associated Diagnoses: Benign essential hypertension; Hyperlipidemia LDL goal <70      Ferrous Sulfate 324 MG TBEC Take 1 tablet by mouth daily.      folic acid (FOLVITE) 1 MG tablet TAKE 1 TABLET BY MOUTH DAILY  Qty: 90 tablet, Refills: 3    Comments: Please send a replace/new response with 90-Day Supply if appropriate to maximize member benefit. Requesting 1 year supply.  Associated Diagnoses: Liver replaced by transplant (H)      gabapentin (NEURONTIN) 250 MG/5ML  solution Take 6 mLs (300 mg) by mouth at bedtime  Qty: 180 mL, Refills: 0    Associated Diagnoses: Liver replaced by transplant (H)      hydrALAZINE (APRESOLINE) 50 MG tablet Take 1 tablet (50 mg) by mouth 3 times daily. hold if SBP <100mmHg.    Associated Diagnoses: Benign essential hypertension      ketoconazole (NIZORAL) 2 % external shampoo Apply topically twice a week. Lather in the shower, wait 3-5 minutes before rinsing.    Associated Diagnoses: Seborrheic dermatitis      levothyroxine (SYNTHROID/LEVOTHROID) 175 MCG tablet Take 1 tablet (175 mcg) by mouth daily.  Qty: 90 tablet, Refills: 1    Associated Diagnoses: Postoperative hypothyroidism      linagliptin (TRADJENTA) 5 MG TABS tablet Take 1 tablet (5 mg) by mouth daily.  Qty: 90 tablet, Refills: 1    Associated Diagnoses: Postoperative hypothyroidism; Papillary thyroid carcinoma (H); Type 2 diabetes mellitus with stage 3 chronic kidney disease, without long-term current use of insulin (H)      metoprolol succinate ER (TOPROL XL) 25 MG 24 hr tablet Take 25 mg by mouth daily. HOLD if SBP<100 and/or HR<55      Multiple Vitamins-Minerals (PRESERVISION AREDS 2) CAPS Take 1 capsule by mouth 2 times daily    Associated Diagnoses: Liver replaced by transplant (H)      omega-3 acid ethyl esters (LOVAZA) 1 g capsule Take 1 capsule (1 g) by mouth 2 times daily.    Associated Diagnoses: Chronic kidney disease, stage 4 (severe) (H)      !! predniSONE (DELTASONE) 1 MG tablet Take 4 tablets (4 mg) by mouth daily. Take 4mg (1mg tablets x4) and Take with 5mg tablet to equal 9mg daily  Qty: 90 tablet, Refills: 0    Comments: SEND STAT  Associated Diagnoses: Status post liver transplantation (H); Intra-abdominal infection      !! predniSONE (DELTASONE) 5 MG tablet Take 1 tablet (5 mg) by mouth daily. Take with 5mg tablet with 4mg (1mg tablets x4) to equal 9mg daily  Qty: 30 tablet, Refills: 0    Comments: SEND STAT  Associated Diagnoses: Status post liver transplantation  (H); Intra-abdominal infection      sertraline (ZOLOFT) 50 MG tablet Take 1 tablet (50 mg) by mouth daily.  Qty: 30 tablet, Refills: 0    Associated Diagnoses: Depression, unspecified depression type      sirolimus (GENERIC EQUIVALENT) 1 MG tablet Take 1 tablet (1 mg) by mouth daily.  Qty: 90 tablet, Refills: 3    Comments: TXP DT 5/22/2002 (Liver) TXP Dischg DT 6/3/2002 DX Liver replaced by transplant Z94.4 TX [facility] ([place], [place])  Associated Diagnoses: Liver replaced by transplant (H)      tacrolimus (GENERIC EQUIVALENT) 0.5 MG capsule Take 1 capsule (0.5 mg) by mouth every 12 hours.  Qty: 60 capsule, Refills: 11    Comments: TXP DT 5/22/2002 (Liver) TXP Dischg DT 6/3/2002 DX Liver replaced by transplant Z94.4 [facility] ([place], [place])  Associated Diagnoses: Liver replaced by transplant (H)      ursodiol (ACTIGALL) 250 MG tablet Take 1 tablet (250 mg) by mouth 2 times daily.  Qty: 180 tablet, Refills: 3    Associated Diagnoses: Liver replaced by transplant (H)      albuterol (PROAIR HFA/PROVENTIL HFA/VENTOLIN HFA) 108 (90 Base) MCG/ACT inhaler Inhale 2 puffs into the lungs every 4 hours as needed for shortness of breath, wheezing or cough.  Qty: 18 g, Refills: 0    Comments: Pharmacy may dispense brand covered by insurance (Proair, or proventil or ventolin or generic albuterol inhaler)  Associated Diagnoses: Pleural effusion; SOB (shortness of breath)      Continuous Glucose Sensor (FREESTYLE NINO 2 SENSOR) MISC Change every 14 days.  Qty: 2 each, Refills: 5    Comments: Medicare B  Associated Diagnoses: Postoperative hypothyroidism; Papillary thyroid carcinoma (H); Type 2 diabetes mellitus with stage 3 chronic kidney disease, without long-term current use of insulin (H)      D-MANNOSE PO Take 1 Scoop by mouth 2 times daily.      EPINEPHrine (ANY BX GENERIC EQUIV) 0.3 MG/0.3ML injection 2-pack Inject 0.3 mLs  (0.3 mg) into the muscle as needed for anaphylaxis. May repeat one time in 5-15 minutes if response to initial dose is inadequate.  Qty: 2 each, Refills: 1    Associated Diagnoses: Anaphylaxis, sequela      evolocumab (REPATHA SURECLICK) 140 MG/ML prefilled autoinjector Inject 1 mL (140 mg) subcutaneously every 14 days. . Please have fasting labs drawn for further refills.  Qty: 6 mL, Refills: 3    Associated Diagnoses: Hyperlipidemia LDL goal <70; Statin intolerance; HDL deficiency; Type 2 diabetes mellitus with stage 3 chronic kidney disease, without long-term current use of insulin (H); Hypertriglyceridemia; H/O: CVA (cerebrovascular accident)      Glucagon (GVOKE HYPOPEN) 1 MG/0.2ML pen Inject the contents of 1 device under the skin into lower abdomen, outer thigh, or outer upper arm as needed for hypoglycemia. If no response after 15 minutes, additional 1 mg dose from a new device may be injected while waiting for emergency assistance.    Associated Diagnoses: Type 2 diabetes mellitus with stage 3b chronic kidney disease, without long-term current use of insulin (H)      glucose (BD GLUCOSE) 5 g chewable tablet Take 2 tablets (10 g) by mouth as needed (low blood sugar)  Qty: 40 tablet, Refills: 1    Associated Diagnoses: Type 2 diabetes mellitus with stage 3 chronic kidney disease, without long-term current use of insulin (H)      glucose 40 % (400 mg/mL) gel Take 15-30 g by mouth every 15 minutes as needed for low blood sugar.    Associated Diagnoses: Type 2 diabetes mellitus with stage 3b chronic kidney disease, without long-term current use of insulin (H)      magnesium citrate 1.745 GM/30ML solution Drink 1 bottle at 8pm the night before surgery  Qty: 296 mL, Refills: 0    Associated Diagnoses: Diverticulitis of colon      metroNIDAZOLE (FLAGYL) 500 MG tablet Take 1 tablet (500 mg) by mouth every 6 hours. At 8:00 am, 2:00 pm, 8:00 pm the day prior to your surgery with neomycin and zofran.  Qty: 3 tablet,  Refills: 0    Associated Diagnoses: Diverticulitis of colon      neomycin (MYCIFRADIN) 500 MG tablet Take 2 tablets (1,000 mg) by mouth every 6 hours. At 8:00 am, 2:00 pm, 8:00 pm the day prior to your surgery with flagyl and zofran.  Qty: 6 tablet, Refills: 0    Associated Diagnoses: Diverticulitis of colon      NONFORMULARY Apply 1 Application topically daily as needed. Momma Bear Nerve Lotion - pt uses on feet      Nutrisource Fiber PO packet Take 1 packet by mouth daily. Benefiber      ondansetron (ZOFRAN ODT) 4 MG ODT tab Take 1 tablet (4 mg) by mouth every 6 hours as needed.    Associated Diagnoses: Sepsis without acute organ dysfunction, due to unspecified organism (H)      ondansetron (ZOFRAN) 4 MG tablet Take 1 tablet (4 mg) by mouth every 6 hours. At 8:00 am, 2:00 pm, 8:00 pm the day prior to your surgery with neomycin and flagyl.  Qty: 3 tablet, Refills: 0    Associated Diagnoses: Diverticulitis of colon      oxymetazoline (AFRIN) 0.05 % nasal spray Spray 0.2 mLs (2 sprays) into both nostrils 2 times daily as needed for congestion.  Qty: 30 mL, Refills: 0    Associated Diagnoses: Epistaxis      polyethylene glycol (MIRALAX) 17 GM/Dose powder Please take 238 grams mixed with 64 oz of Gatorade at 4pm the night before surgery  Qty: 238 g, Refills: 0    Associated Diagnoses: Diverticulitis of colon      triamcinolone (KENALOG) 0.1 % external ointment Apply topically 2 times daily. Use 2 weeks or less.  Qty: 80 g, Refills: 0    Associated Diagnoses: Stasis dermatitis of both legs       !! - Potential duplicate medications found. Please discuss with provider.        STOP taking these medications       nystatin (MYCOSTATIN) 545241 UNIT/ML suspension Comments:   Reason for Stopping:             Allergies   Allergies   Allergen Reactions    Fluconazole Hives and Itching     Full body hives  **Intradermal skin testing negative**  [See intradermal skin testing results from 8/2/2019]    Mycophenolate Diarrhea and  Nausea and Vomiting     Patient stated it was chronic and lasted months      Penicillins Anaphylaxis, Hives, Itching and Rash     **Intradermal skin testing negative**  [See intradermal skin testing results from 8/2/2019]      Simvastatin Muscle Pain (Myalgia)     severe  Other reaction(s): Myalgia caused by statin    Methotrexate Other (See Comments)     Other reaction(s): Sore  Sores in mouth, esophagus, and stomach.       Morphine And Codeine Itching and Other (See Comments)     Psych disturbance  Other reaction(s): Confusion, Mood alteration    Quinolones Anxiety, Dizziness, Headache, Other (See Comments), Palpitations and Unknown     Other reaction(s): Hyperactive behavior, Lightheadedness, Mood alteration    Dizzy, light headed    Dizziness, shaky, and jumpy    Capsules, Empty Gelatin [Gelatin]     Carvedilol Diarrhea     Per pt - severe diarrhea and LE swelling  + tolerating Toprol    Lansoprazole Diarrhea    Strawberry Extract     Azithromycin Itching     [See intradermal skin testing results from 8/2/2019]    Bactrim [Sulfamethoxazole-Trimethoprim] Other (See Comments)     Numb mouth, tingling lips (treated with anti-histamines)    Cephalosporins Itching     [See intradermal skin testing results from 8/2/2019]    Ciprofloxacin Hcl Other (See Comments) and Dizziness     Insomnia, mood lability, Irregular heart beat         Doxycycline Itching and Unknown     [See intradermal skin testing results from 8/2/2019]    Lisinopril Cough    Omeprazole Itching    Tigecycline Other (See Comments)     Perioral numbness in 2025 with long-term use    Tolectin [Nsaids] Rash    Tolmetin Rash and Itching    Tramadol Rash, Hives and Itching

## 2025-06-11 ENCOUNTER — APPOINTMENT (OUTPATIENT)
Dept: PHYSICAL THERAPY | Facility: CLINIC | Age: 75
DRG: 329 | End: 2025-06-11
Payer: MEDICARE

## 2025-06-11 LAB
ANION GAP SERPL CALCULATED.3IONS-SCNC: 12 MMOL/L (ref 7–15)
BUN SERPL-MCNC: 46.7 MG/DL (ref 8–23)
CALCIUM SERPL-MCNC: 8.4 MG/DL (ref 8.8–10.4)
CHLORIDE SERPL-SCNC: 108 MMOL/L (ref 98–107)
CREAT SERPL-MCNC: 1.55 MG/DL (ref 0.51–0.95)
EGFRCR SERPLBLD CKD-EPI 2021: 34 ML/MIN/1.73M2
ERYTHROCYTE [DISTWIDTH] IN BLOOD BY AUTOMATED COUNT: 17 % (ref 10–15)
GLUCOSE BLDC GLUCOMTR-MCNC: 149 MG/DL (ref 70–99)
GLUCOSE BLDC GLUCOMTR-MCNC: 150 MG/DL (ref 70–99)
GLUCOSE BLDC GLUCOMTR-MCNC: 176 MG/DL (ref 70–99)
GLUCOSE BLDC GLUCOMTR-MCNC: 178 MG/DL (ref 70–99)
GLUCOSE BLDC GLUCOMTR-MCNC: 183 MG/DL (ref 70–99)
GLUCOSE BLDC GLUCOMTR-MCNC: 196 MG/DL (ref 70–99)
GLUCOSE SERPL-MCNC: 182 MG/DL (ref 70–99)
HCO3 SERPL-SCNC: 21 MMOL/L (ref 22–29)
HCT VFR BLD AUTO: 24.2 % (ref 35–47)
HGB BLD-MCNC: 7.5 G/DL (ref 11.7–15.7)
MAGNESIUM SERPL-MCNC: 2 MG/DL (ref 1.7–2.3)
MCH RBC QN AUTO: 28.3 PG (ref 26.5–33)
MCHC RBC AUTO-ENTMCNC: 31 G/DL (ref 31.5–36.5)
MCV RBC AUTO: 91 FL (ref 78–100)
PHOSPHATE SERPL-MCNC: 3.1 MG/DL (ref 2.5–4.5)
PLATELET # BLD AUTO: 254 10E3/UL (ref 150–450)
POTASSIUM SERPL-SCNC: 4.4 MMOL/L (ref 3.4–5.3)
RBC # BLD AUTO: 2.65 10E6/UL (ref 3.8–5.2)
SODIUM SERPL-SCNC: 141 MMOL/L (ref 135–145)
WBC # BLD AUTO: 10.2 10E3/UL (ref 4–11)

## 2025-06-11 PROCEDURE — 99232 SBSQ HOSP IP/OBS MODERATE 35: CPT | Mod: GC | Performed by: STUDENT IN AN ORGANIZED HEALTH CARE EDUCATION/TRAINING PROGRAM

## 2025-06-11 PROCEDURE — 250N000013 HC RX MED GY IP 250 OP 250 PS 637: Performed by: PHYSICIAN ASSISTANT

## 2025-06-11 PROCEDURE — 84100 ASSAY OF PHOSPHORUS: CPT | Performed by: STUDENT IN AN ORGANIZED HEALTH CARE EDUCATION/TRAINING PROGRAM

## 2025-06-11 PROCEDURE — 250N000013 HC RX MED GY IP 250 OP 250 PS 637

## 2025-06-11 PROCEDURE — 83735 ASSAY OF MAGNESIUM: CPT | Performed by: STUDENT IN AN ORGANIZED HEALTH CARE EDUCATION/TRAINING PROGRAM

## 2025-06-11 PROCEDURE — 97530 THERAPEUTIC ACTIVITIES: CPT | Mod: GP | Performed by: PHYSICAL THERAPIST

## 2025-06-11 PROCEDURE — 80048 BASIC METABOLIC PNL TOTAL CA: CPT

## 2025-06-11 PROCEDURE — 85014 HEMATOCRIT: CPT | Performed by: PHYSICIAN ASSISTANT

## 2025-06-11 PROCEDURE — 36415 COLL VENOUS BLD VENIPUNCTURE: CPT

## 2025-06-11 PROCEDURE — 250N000013 HC RX MED GY IP 250 OP 250 PS 637: Performed by: SURGERY

## 2025-06-11 PROCEDURE — 250N000012 HC RX MED GY IP 250 OP 636 PS 637: Performed by: STUDENT IN AN ORGANIZED HEALTH CARE EDUCATION/TRAINING PROGRAM

## 2025-06-11 PROCEDURE — 250N000012 HC RX MED GY IP 250 OP 636 PS 637: Performed by: NURSE PRACTITIONER

## 2025-06-11 PROCEDURE — 120N000002 HC R&B MED SURG/OB UMMC

## 2025-06-11 PROCEDURE — 97110 THERAPEUTIC EXERCISES: CPT | Mod: GP | Performed by: PHYSICAL THERAPIST

## 2025-06-11 PROCEDURE — G0463 HOSPITAL OUTPT CLINIC VISIT: HCPCS

## 2025-06-11 RX ADMIN — EZETIMIBE 10 MG: 10 TABLET ORAL at 21:10

## 2025-06-11 RX ADMIN — GABAPENTIN 300 MG: 250 SOLUTION ORAL at 21:12

## 2025-06-11 RX ADMIN — APIXABAN 2.5 MG: 2.5 TABLET, FILM COATED ORAL at 08:05

## 2025-06-11 RX ADMIN — CALCITRIOL CAPSULES 0.25 MCG 0.25 MCG: 0.25 CAPSULE ORAL at 08:05

## 2025-06-11 RX ADMIN — SERTRALINE HYDROCHLORIDE 50 MG: 50 TABLET ORAL at 08:05

## 2025-06-11 RX ADMIN — THERA TABS 1 TABLET: TAB at 08:05

## 2025-06-11 RX ADMIN — URSODIOL 250 MG: 250 TABLET ORAL at 20:06

## 2025-06-11 RX ADMIN — Medication 20000 UNITS: at 08:05

## 2025-06-11 RX ADMIN — LIDOCAINE 2 PATCH: 4 PATCH TOPICAL at 14:06

## 2025-06-11 RX ADMIN — NYSTATIN 1000000 UNITS: 100000 SUSPENSION ORAL at 08:05

## 2025-06-11 RX ADMIN — LEVOTHYROXINE SODIUM 175 MCG: 0.17 TABLET ORAL at 08:05

## 2025-06-11 RX ADMIN — Medication 1 EACH: at 14:07

## 2025-06-11 RX ADMIN — Medication 2 MG: at 08:06

## 2025-06-11 RX ADMIN — Medication 2 MG: at 18:42

## 2025-06-11 RX ADMIN — URSODIOL 250 MG: 250 TABLET ORAL at 08:05

## 2025-06-11 RX ADMIN — ESTRADIOL 2 G: 0.1 CREAM VAGINAL at 20:07

## 2025-06-11 RX ADMIN — NYSTATIN 1000000 UNITS: 100000 SUSPENSION ORAL at 20:06

## 2025-06-11 RX ADMIN — Medication 9 MG: at 08:06

## 2025-06-11 RX ADMIN — Medication 1000 MCG: at 21:10

## 2025-06-11 RX ADMIN — OMEGA-3-ACID ETHYL ESTERS 1 G: 1 CAPSULE, LIQUID FILLED ORAL at 08:05

## 2025-06-11 RX ADMIN — APIXABAN 2.5 MG: 2.5 TABLET, FILM COATED ORAL at 20:06

## 2025-06-11 RX ADMIN — SENNOSIDES 2 TABLET: 8.6 TABLET, FILM COATED ORAL at 20:06

## 2025-06-11 RX ADMIN — OMEGA-3-ACID ETHYL ESTERS 1 G: 1 CAPSULE, LIQUID FILLED ORAL at 20:06

## 2025-06-11 ASSESSMENT — ACTIVITIES OF DAILY LIVING (ADL)
ADLS_ACUITY_SCORE: 56

## 2025-06-11 NOTE — PROGRESS NOTES
Care Management Follow Up    Length of Stay (days): 14    Expected Discharge Date: 06/12/2025     Concerns to be Addressed: discharge planning     Patient plan of care discussed at interdisciplinary rounds: Yes    Anticipated Discharge Disposition: Transitional Care  Anticipated Discharge Services: Transportation Services  Anticipated Discharge DME: None    Patient/family educated on Medicare website which has current facility and service quality ratings: yes  Education Provided on the Discharge Plan:  yes  Patient/Family in Agreement with the Plan: yes    Referrals Placed by CM/SW:      Accepted  [facility] - [facility]  [address] [place] [address]  P: [phone]  F: [phone]  - Will need to be sent with at least 7 days of ostomy supplies    Private pay costs discussed: Not applicable    Discussed  Partnership in Safe Discharge Planning  document with patient/family: Yes: Document provided     Handoff Completed: Yes, LIZ PCP: Internal handoff referral completed    Additional Information:  Tentative plan for patient to discharge to [facility] ([facility] u tomorrow. Wheelchair transportation scheduled for tomorrow 6/12/25 with a pickup window of [phone].    NADEEM received a call from Jade hope with [facility] admissions. She reported to  they have ordered ostomy supplies for the patient, however it will not arrive tell next week. Jade requested the patient be sent with at least 7 days worth of ostomy supplies.   NADEEM stated they will pass that on to the team and ensure the patient is sent with necessary supplies.     [id number]     Next Steps: Plan for patient to discharge to TCU tomorrow. Discharge orders will need to be sent to facility at least 2 hours prior to discharge.  IMM needed at the time of discharge.     NAINA Hill  [address]   Office: [phone]  [email or web address]  Securely message with [employer] (Search Name or  7C Med Surg [phone] SW)  Text page via [facility] Paging/Directory   See signed in provider for up to date coverage information  Social Work & Care Management Department

## 2025-06-11 NOTE — PROGRESS NOTES
[facility]    Progress Note - Medicine Service, JOVANNY TEAM 3       Date of Admission:  5/28/2025    Assessment & Plan   Luz Thompson is a 75 year old female who was admitted on 5/28/2025 with past medical history of atrial fibrillation, DVT on eliquis, CVA, CKD III, HFpEF (TTE 3/9/25 EF 60-65%), T2DM, biliary cirrhosis s/p liver transplant in 2002, EBV, HTN, HLD, Sjogren's syndrome, Basal Cell Carincoma s/p MOHS on 4/8/25, thyroid cancer s/p thyroidectomy in 2010, Sjogren's syndrome, and diverticulitis with recent admission from 4/14-4/23 for ESBL UTI & E. Faecalis bacteremia related to sigmoid microperforation (treated with IV ertapenem) who is admitted for abdominal pain and hypotension, found to have smoldering diverticulitis with 4.1cm abscess s/p open sigmoidectomy and end colostomy on 5/30/25 and now s/p IV antibiotics due to bacteremia (GPC and VRE) and weaned off stress-dose steroids..     Today:  - Start Prednisone 9 mg today   - Discharge to TCU Thursday      # Diverticulitis c/b abscess s/p open sigmoidectomy with end colostomy 5/30/25  # Sepsis 2/2 Klebsiella pneumoniae and Pseudomonas aeruginosa bacteremia 5/28, likely of intra-abdominal origin   # Hypotension 2/2 sepsis  Patient now hypertensive, after coming off pressors on 5/30. Currently with ongoing IV steroid taper, will plan to stop continuous maintenance fluids. Will hold PTA antihypertensives while titrating and resume when able.  Transitioned from meropenem to ceftolozane-tazobactam due to resistance (psuedomonas coverage) and switch vancomycin to linezolid iso DERREK for coverage of previous VRE E Facecium.  From ID perspective, there is no good oral antibiotic options and patient will complete 7-day IV antibiotic course at the hospital (first day of blood culture clearance 6/3). New cloudy PATY drain output on 6/6. Ongoing leukocytosis but no new localizing symptoms so will continue to  monitor.   -CT A/P negative abscess/fluid collection   - CRS consulted              - ADAT  - subcutaneous heparin for DVT ppx, continue to hold eliquis for now  - Hx of liver transplant - immunosuppression per transplant immunology team   - High dose Vitamin A x30 days post-op for wound healing support in setting of immunosuppression  - senokot BID  - Continue WOC ostomy cares   - Removed PATY drain   - Encourage out of bed and ambulation, incentive spirometer use   - Transplant ID following  - meropenem (5/31- 6/3) for pseudomonas coverage  - linezolid (6/1 -6/3)  for enterococcal bacteremia  - Ceftolozane-Tazobactam (6/3-6/9), for a 7-day course   - Pain regimen:              - Scheduled: Robaxin QID, tylenol 975mg BID, gabapentin 300mg QHS, Lidocaine patches daily, Diclofenac QID  -  PRN: PO oxycodone 5mg  - Blood cultures drawn 5/28 positive for Klebsiella and Pseudomonas  - Bcx (5/31): E Faecium VRE  - Bcx (6/2): GPC in pairs and chains   - Bcx (6/3): NGTD   - Transition to Prednisone 9 mg   - If decreasing back to IS dosing, would pause on pred 10 until tac level therapeutic (goal 3-5)   - hold PTA amlodipine while tapering fluids and IV steroids    # Acute blood loss anemia    # Anemia of critical illness  # coagulopathy due to cirrhosis and surgical blood loss    # thrombocytopenia   # anemia of critical illness   No overt bleeding and ostomy output/vitals stable.   - Transfuse if hgb <7.0 or signs/symptoms of hypoperfusion. Monitor and trend.   - PTA Ferous sulfate, folic acid  - Transfused 1 unit pRBC (6/7)     #C/f TIA   In the morning of 6/4, patient states that she has had speech problems in the last couple of days since the surgery.  She describes that she has trouble getting her words out and that her speech does not feel as fluent as it was previously.  She denies any new numbness or tingling.  No headache or changes in vision.  Nonfocal on exam.  CT head negative for intracranial bleed or other acute  process.  TTE with bubble also negative.  Certainly possible that patient may have had a TIA following surgery as anticoagulation had been held.  Will proceed with MRI brain negative for acute infarct.    - Continue PTA Xarelto     # DERREK on CKD3, resolved   # Bilateral stent placement per urology 5/29  # ATN  DERREK in the setting of hypotension, shock, nephrotoxic meds which is now resolved after IVF and improved PO after surgery.   - Baseline creatinine 1.3-1.6  - external catheter   - PTA estradiol vaginal  cream     # Primary biliary cirrhosis s/p liver transplant 2002  # EBV +  # Reactive Leukocytosis   # Sjogren's syndrome  - Transplant Hepatology consulted  - No sirolimus  - Resumed PTA Prednisone 9 mg PO   - Tac 2 mg po BID per transplant hep  - PTA Nystatin QID  - PTA Ursodiol      # Acute post-op pain  # high risk delirium   - delirium precaution   - Monitor neurological status. Delirium preventions and precautions.   - pain regimen as above  - PTA sertraline  - melatonin Q HS  - PRN narcan     # Protein calorie deficit malnutrition    - Nutrition consulted  - Supplements: Gelatin Plus TID with meals + PRN per patient request  - Thera-Vit 1 tablet daily   - Vitamin A level (ordered)     # Post operative ventilatory support, resolved  # Acute hypoxic respiratory support   Currently on 1L NC.  - Supplemental oxygen to keep saturation above 92 %.  - PRN albuterol      # Paroxysmal A-fib on apixaban   # Hypertension  # HFpEF (TTE 3/9/25 EF 60-65%)   # history of CVA  # Vasoplagia  - Monitor hemodynamic status.  - Levophed off since 0400 5/31  - MAP goal >65  - PTA ezetimibe  - holding PTA amlodipine 5mg BID  - Continue PTA apixaban 2.5mg BID  - Holding metoprolol tartrate 12.5 mg BID  - holding hydralazine, when reordering would prefer 2 25 mg tabs   - SBP >100 or MAP >65  - weaning steroids as above      # Type 2 diabetes  # Stress hyperglycemia    - MDSSI  - Goal to keep BG< 180 for optimal wound healing   -  "Glucose AC/HS  - hypoglycemic protocol  - On prednisone, transitioned from hydrocortisone      # Thyroid cancer status post thyroidectomy 2010  - PTA calcitriol 0.25mcg  - PTA levothyroxine 175mcg     # Weakness and deconditioning of critical illness   - Physical and occupational therapy consult   - up with assist, fall precaution      # Basal cell carcinoma status post Mohs April 2025            Diet: Snacks/Supplements Adult: Other; PRN per pt request; With Meals  Snacks/Supplements Adult: Other; Offer pt Gelatein Plus or 20 TID with meals - please ask what flavor she prefers with each meal; With Meals  Room Service  Advance Diet as Tolerated: Regular Diet Adult; Regular Diet Adult  Snacks/Supplements Adult: Glucerna; Between Meals    DVT Prophylaxis:Eliquis  Ron Catheter: Not present  Fluids: PO  Lines: None     Cardiac Monitoring: None  Code Status: Full Code      Clinically Significant Risk Factors          # Hyperchloremia: Highest Cl = 108 mmol/L in last 2 days, will monitor as appropriate          # Hypoalbuminemia: Lowest albumin = 2.4 g/dL at 5/29/2025  3:15 PM, will monitor as appropriate     # Hypertension: Noted on problem list            # Obesity: Estimated body mass index is 30.04 kg/m  as calculated from the following:    Height as of this encounter: 1.702 m (5' 7\").    Weight as of this encounter: 87 kg (191 lb 12.8 oz).        # Financial/Environmental Concerns: none          Disposition Plan     Medically Ready for Discharge: Anticipated in 2-4 Days         The patient's care was discussed with the Attending Physician, Dr. Patel and Patient.    Negrito Christensen MD  Medicine Service, [facility] TEAM 3  [facility]  Securely message with [employer] (more info)  Text page via [facility] Paging/Directory   See signed in provider for up to date coverage information  ______________________________________________________________________    Interval History   No acute " events overnight.  Addressed her concerns about her blood pressure.  Informed her that her BP is fluctuating and that treating hypertension with her PTA medication may cause hypotension in the setting of weaning stress dose steroids.  No new symptoms or complaints.    Physical Exam   Vital Signs: Temp: 98.3  F (36.8  C) Temp src: Oral BP: 117/81 Pulse: 98   Resp: 18 SpO2: 94 % O2 Device: None (Room air)    Weight: 191 lbs 12.8 oz    Gen: Comfortable appearing older woman lying in chair by the bed in no distress  HEENT: Atraumatic, nonicteric sclera  CV: RRR, limbs well perfused. No accessory heart sounds.   Pulm: Clear to auscultation in all fields, no wheezing or crackles  Abd: Nontender, nondistended abdomen. Ostomy output stable, no change in characteristic. PATY drain no longer in place  Skin: No lesions on exposed skin  Neuro: Appears alert and is appropriately conversational, moving all limbs spontaneously  Psych: Appropriate to the situation      Medical Decision Making       Please see A&P for additional details of medical decision making.      Data     I have personally reviewed the following data over the past 24 hrs:    10.2  \   7.5 (L)   / 254     141 108 (H) 46.7 (H) /  183 (H)   4.4 21 (L) 1.55 (H) \       Imaging results reviewed over the past 24 hrs:   No results found for this or any previous visit (from the past 24 hours).

## 2025-06-11 NOTE — PROGRESS NOTES
[facility]  WO Nurse Inpatient Assessment     Consulted for: new end colostomy    Summary:   5/30 - Pt's daughter Gloria at bedside for pouch change demo, would like to be present for education  6/3 - daughter not here - stool present in pouch  6/4: daughter not at bedside- unable to make it to hospital today. Pouch intact. Pt too tired for education today. Pt due for pouch change Thursday.   6/5: met with daughter and pt at bedside, pt able to use coloplast pouch closure much better than the manny one, pt able to remove pouch with assist. Daughter able to complete the rest of the pouch change with prompting. Placed convex coloplast pouch today but not sure if this is the correct fit, ordered some flat coloplast pouches with convex oval ring to try for tomorrow, plan to meet with daughter at 2pm tomorrow for continued teaching.   6/6: pouch intact, new supplies not available in room, ordered flat coloplast pouch and convex oval rings again. Pt and daughter prefer coloplast for the closure.  6/9: MCJ separation increased and with depth to medial side - will use [place] convex for now, can switch to Coloplast later if Pt will be emptying pouch independently. Discussed with colorectal provider at bedside  6/10: pouch leaking and WOC called to bedside. Pouch already removed. Per RN pouch leaking approx 3 and 9 o'clock. Will add large belt today. Per patient daughter will return for more education once stable POC is in place.     WO nurse follow-up plan: daily     Patient History (according to provider note(s):      75 year old female with history of liver transplant admitted for smoldering diverticulitis with 4.1cm abscess s/p open sigmoidectomy and end colostomy on 5/29/25,     Assessment:      Areas visualized during today's visit: Focused: Abdomen      6/2 LLQ      6/9      Assessment of new end Colostomy:  Diagnosis Pertinent to Stoma: Diverticulitis with  perforation      Surgery Date: 5/29/25  Surgeon: Al Campbell MD       Hospital[facility]  Pouching system in place on assessment today: [place] two piece, cut to fit, convex, ostomy ring, skin prep, and Hydrofera Blue Classic barrier ring.   Pouch last changed/wear time: 1 day  Reason for pouch change today: ostomy education and routine schedule  Effectiveness of current pouching/ supply plan: Attempting new system, will re-evaluate next assessment  Change made with ostomy management today: Yes  Pouching system placed today: [place] two piece, cut to fit, convex, ostomy ring, skin prep, and Hydrofera Blue Classic, ostomy paste.   Supplies: ordered [place] pouches and convex barriers, Hydrofera Blue Classic    Last photo: 6/9/25    Stoma location: LLQ  Stoma size: 1 1/2 inches,   Stoma appearance: red, round, moist, retracted, and necrotic 11 -1 o'clock  Mucocutaneous junction:   circumferentially - irregular shape area with depth from 6-11 o'clock, approx 2 x 1.5 x 1 cm deep  Peristomal complication(s): none   Output: soft and brown stool, gas  Output volume emptied during visit: 100ml    Abdominal assessment: Soft  Surgical site(s): dressing dry and intact  NG still in place? No  Pain: denies  Is patient still on a PCA? No    Ostomy education assessment:  Participant of teaching session today: patient   Education completed today: Stoma assessment, Pouching system assessment , Adjustment of pouching plan, Ostomy accessory product use , Pouch emptying return demonstration, When to seek medical attention, and Lifestyle adjustments   Educational materials/methods: Verbal, Demonstration, Return demonstration, and Addressed patient/caregiver questions/concerns  Education still needed: Pouch change return demonstration and Discharge instructions-- additional pouch change practice for daughter (plan is for TCU)  Learning Comprehension:   Psychosocial assessment: Pt says she wants to be able to change pouch  after discharge - wants to return to independent living apartment with some assistance (was in TCU prior to this admission and plan is for TCU this discharge). Daughter can assist if needed, wants to be present for teaching  Patient readiness for education today: attentive and active participation,   Following today's visit: patient  and family member daughter is able to demonstrate;         1. How to empty their pouch? Yes, with moderate assist, and Needs additional practice         2. How to change their pouch? Yes, with moderate assist, and Needs additional practice          Preparation for discharge completed: No discharge preparation started yet-- still adjusting pouching plan   Preparation for discharge still needed: Placed prescription recommendations in discharge navigator for MD to sign, Ensured patient has extra supplies for discharge, Discussed how to order supplies after discharge , Ordered samples from  after gaining consent from patient/caregiver, Discussed how and when to make an outpatient WOC nurse appointment after discharge, Prepared for discharge home with home care, and Discuss signs/symptoms of when to seek medical attention  Pt support system on discharge: Daughter Gloria  [facility] recommend home care? By WOC RN if possible  Face to face time: 20 minutes    Wound location: peristomal - medial side      Last photo: 6/9/25  Wound due to: MCJ separation  Wound history/plan of care: early MCJ separation and small areas of necrosis - more significant wound noted 6/9   Wound base: 50 % Adipose tissue, 25 % Slough, Yellow, and Stringy brown, 25% red non granular     Palpation of the wound bed: normal      Drainage: moderate     Description of drainage: serous     Measurements (length x width x depth, in cm): irregular shape 2  x 1.5  x  1 cm      Tunneling: N/A     Undermining: N/A  Periwound skin: Intact      Color: normal and consistent with surrounding tissue      Temperature: normal   Odor:  "none  Pain: mild and facial expression of distress, tender with cleaning and measurements  Pain interventions prior to dressing change: slow and gentle cares  and distraction  Treatment goal: Infection control/prevention, Increase granulation, Protection, Remove necrotic tissue, and successful ostomy pouching  STATUS: stable  Supplies ordered: ordered Hydrofera Blue Classic    Treatment Plan:     Peristomal wound: With pouch changes - clean open wound to medial side of stoma with wound spray. Cut piece of Hydrofera Blue Classic to size of open area. Wet Hydrofera Blue (#[id number]) with saline and squeeze out excess, place in wound base, apply small bead of ostomy paste to the medial edge of hydrafera blue/Pt's skin to help hold in/seal hydrafera blue prior to applying ostomy pouch.    LLQ Colostomy pouching plan:   Pouching system: ostomy supplies pouches: Marina 57 FECAL ([id number]) ostomy supplies barrier: [place] 57mm SOFT CONVEX ([id number])  Accessories used: WOC ostomy accessories: 2\" Shireen Ring (669231), Adapt Paste (310882), Large Belt (284646), and Hydrafera Blue Classist to fill open area on medial side of stoma (MCJ separation)   Frequency of pouch changes: Daily due to MCJ separation  WOC follow up plan: Daily Monday-Friday (as able)  Bedside RN interventions: Change pouch PRN if leaking using the supplies above, Empty pouch when 1/3 to 1/2 full, ensure to clean pouch outlet after emptying to prevent odor, Notify WOC for ongoing pouch leakage, Document stoma appearance and output volume, color, and consistency every shift, and Encourage patient to empty pouch with assist     Orders: reviewed and updated     DATA:     Current support surface: Standard    Containment of urine/stool: Suction based external urinary catheter  and Colostomy pouch  BMI: Body mass index is 28.28 kg/m .   Active diet order: Orders Placed This Encounter      Clear Liquid Diet     Output: I/O last 3 completed shifts:  In: 9336.64 " [P.O.:270; I.V.:1436.64; IV Piggyback:50]  Out: 1680 [Urine:1250; Drains:430]     Labs:   Recent Labs   Lab 05/30/25  0412 05/29/25  1515 05/29/25  1051 05/29/25  0546   ALBUMIN  --  2.4*  --   --    HGB 8.6* 8.5*   < > 7.6*   INR  --   --   --  1.49*   WBC 20.2* 20.2*  --  9.1   A1C  --  6.3*  --   --     < > = values in this interval not displayed.     Pressure injury risk assessment:   Sensory Perception: 2-->very limited  Moisture: 3-->occasionally moist  Activity: 1-->bedfast  Mobility: 2-->very limited  Nutrition: 2-->probably inadequate  Friction and Shear: 2-->potential problem  Shashank Score: 12      Pager no longer is use, please contact through [facility] group: [facility] Nurse [place]   Dept. Office Number: [phone]

## 2025-06-11 NOTE — PROGRESS NOTES
I agree with the documentation provided by the medical student above and was present for the history and physical exam. I made updates or corrections as appropriate.     Zoë Silva MD  General Surgery resident    [facility]    Progress Note - Colorectal Surgery Service       Date of Admission:  5/28/2025    Assessment & Plan: Surgery   Mrs. Thompson is a 75 year old female with a complex PMH, some of which includes CKD stage 3, CVA and DVT on anticoag, atrial fibrillation, DM2, biliary cirrhosis s/p prior liver transplant admitted for smoldering diverticulitis with 4.1cm abscess on prolonged IV abx as an outpatient.  Was admitted on 5/29 with worsening abdominal pain, hypotension due to gram negative septicemia, diarrhea.  Now s/p exploratory laparotomy, sigmoidectomy and end colostomy on 5/30/25, recovering appropriately with return of bowel function.    Polymicrobial bacteremia likely 2/2 GI source. Having return of bowel function. CT A/P on 6/6 without intra-abd fluid collections nor acute intra-abd pathology.     - Reg diet.  Recommend protein nutritional supplements as able.    - removed PATY drain on 6/9/25, continue staples   - restarted eliquis on 6/4  - Appreciate ID recommendations (still on Lineazolid)  - Hx of liver transplant - immunosuppression per transplant immunology team (changed from sirolimus to tac). On prednisone 9mg daily   - High dose Vitamin A x30 days post-op for wound healing support in setting of immunosuppression  - Continue WOC ostomy cares   - Encourage out of bed and ambulation, incentive spirometer use        Diet: Snacks/Supplements Adult: Other; PRN per pt request; With Meals  Snacks/Supplements Adult: Other; Offer pt Gelatein Plus or 20 TID with meals - please ask what flavor she prefers with each meal; With Meals  Room Service  Advance Diet as Tolerated: Regular Diet Adult; Regular Diet Adult  Snacks/Supplements Adult:  "Glucerna; Between Meals    DVT Prophylaxis: Eliquis  Ron Catheter: Not present  Lines: None     Drains: PRESENT         - 1 Closed/Suction Drain(s)    - 1 Ureteral Drain/Stent(s)   Cardiac Monitoring: None  Code Status: Full Code      Clinically Significant Risk Factors          # Hyperchloremia: Highest Cl = 108 mmol/L in last 2 days, will monitor as appropriate          # Hypoalbuminemia: Lowest albumin = 2.4 g/dL at 5/29/2025  3:15 PM, will monitor as appropriate     # Hypertension: Noted on problem list            # Overweight: Estimated body mass index is 28.97 kg/m  as calculated from the following:    Height as of this encounter: 5' 7\".    Weight as of this encounter: 184 lb 15.5 oz.      # Financial/Environmental Concerns: none         Social Drivers of Health   Tobacco Use: Medium Risk (5/27/2025)    Patient History     Smoking Tobacco Use: Former     Smokeless Tobacco Use: Never   Physical Activity: Insufficiently Active (11/9/2023)    Received from [employer]    Exercise Vital Sign     Days of Exercise per Week: 5 days     Minutes of Exercise per Session: 10 min         Disposition Plan     Medically Ready for Discharge: Anticipated Tomorrow to TCU    The patient's care was discussed with the Colorectal Team.    Prasad Stafford   Medical Student  [facility]  Non-urgent messages: Securely message with [employer] (more info)  Text page via [email or web address] Paging/Directory     ______________________________________________________________________    Interval History   No acute events overnight. She does not have nausea or vomiting. She reports ambulating in her room and spent about 3 hours in the chair yesterday.     Physical Exam   Vital Signs: Temp: 97.8  F (36.6  C) Temp src: Oral BP: (!) 133/97 (MAP-108) Pulse: 85   Resp: 18 SpO2: 97 % O2 Device: None (Room air)    Weight: 184 lbs 15.46 oz  Intake/Output Summary (Last 24 hours) at 6/11/2025 0645  Last data filed at 6/11/2025 " 0611  Gross per 24 hour   Intake 1431 ml   Output 2150 ml   Net -719 ml     General Appearance:  Alert and oriented, no acute distress  Respiratory: no increased work of breathing   Cardiovascular: regular rate   GI: soft, nondistended, non-tender to palpation around ostomy in LLQ, ostomy pink and well perfused with gas as well as thickened stool, incision clean/dry/intact with staples  Skin: no rashes    Data     I have personally reviewed the following data over the past 24 hrs:    10.2  \   7.5 (L)   / 254     141 108 (H) 46.7 (H) /  182 (H)   4.4 21 (L) 1.55 (H) \       Imaging results reviewed over the past 24 hrs:   No results found for this or any previous visit (from the past 24 hours).

## 2025-06-11 NOTE — DISCHARGE INSTRUCTIONS
"Pouch change instructions:    Peristomal wound: With pouch changes - clean open wound to medial side of stoma with wound spray. Cut piece of Hydrofera Blue Classic to size of open area. Wet Hydrofera Blue with saline and squeeze out excess, place in wound base, apply small bead of ostomy paste to the medial edge of hydrafera blue/Pt's skin to help hold in/seal hydrafera blue prior to applying ostomy pouch.    LLQ Colostomy pouching plan:   Pouching system: ostomy supplies pouches: [place] 57 FECAL ([id number]) ostomy supplies barrier: [place] 57mm SOFT CONVEX ([id number])  Accessories used: [facility] ostomy accessories: 2\" Shireen Ring (343875), Adapt Paste (343804), Large Belt (180387), and Hydrafera Blue Classist to fill open area on medial side of stoma (MCJ separation)   Frequency of pouch changes: Daily due to MCJ separation  Bedside RN interventions: Change pouch PRN if leaking using the supplies above, Empty pouch when 1/3 to 1/2 full, ensure to clean pouch outlet after emptying to prevent odor, Notify WOC for ongoing pouch leakage, Document stoma appearance and output volume, color, and consistency every shift, and Encourage patient to empty pouch with assist   "

## 2025-06-11 NOTE — PLAN OF CARE
Shift Hours: 1900 - 0700    Assessment:  Body systems assessments were at patient's baseline.        Activity     Fall Risk Score: 80   Bed alarm on? Yes     Activity Assistance Provided: assistance, 2 people      Assistive Device Utilized: gait belt    Pain: denies    Labs/RN Managed Protocols: Magnesium, potassium and phophorus    Lines/Drains: Right PIB    Nutrition: xxx    Goal Outcome Evaluation  Plan of Care Reviewed With: patient  Overall Patient Progress: no change  Outcome Evaluation: Patient denies pain.  Continues to have numbness to bilateral feet.  Turns in bed with assist.  Did refuse to turn at 0400.  Purwic in place with good urine output.  Colostomy putting out a lot of brenda and a moderate amount of stool. Incision on abdomen stapled wth serous drainage notedl    Barriers to Discharge:   Tolerating po prednisone.  Weakness and fatique

## 2025-06-11 NOTE — PLAN OF CARE
0170-2814. A&Ox4. [place]. VSS on RA. R upper back pain controlled w/ lidocaine patch. Ax2 w/ lift. Up to recliner this evening for dinner. Colostomy intact. Primafit in place w/ adequate UO. Abdominal incision and jyoti CDI. Reg diet, fair appetite. Plan to discharge to TCU tomorrow @ 1130.    Goal Outcome Evaluation:      Plan of Care Reviewed With: patient    Overall Patient Progress: improving

## 2025-06-12 ENCOUNTER — TELEPHONE (OUTPATIENT)
Dept: INFECTIOUS DISEASES | Facility: CLINIC | Age: 75
End: 2025-06-12
Payer: MEDICARE

## 2025-06-12 ENCOUNTER — DOCUMENTATION ONLY (OUTPATIENT)
Dept: GERIATRICS | Facility: CLINIC | Age: 75
End: 2025-06-12
Payer: MEDICARE

## 2025-06-12 VITALS
HEIGHT: 67 IN | DIASTOLIC BLOOD PRESSURE: 75 MMHG | OXYGEN SATURATION: 98 % | BODY MASS INDEX: 30.1 KG/M2 | HEART RATE: 94 BPM | WEIGHT: 191.8 LBS | RESPIRATION RATE: 17 BRPM | TEMPERATURE: 97.5 F | SYSTOLIC BLOOD PRESSURE: 105 MMHG

## 2025-06-12 VITALS
OXYGEN SATURATION: 94 % | DIASTOLIC BLOOD PRESSURE: 70 MMHG | HEART RATE: 100 BPM | BODY MASS INDEX: 29.62 KG/M2 | WEIGHT: 188.7 LBS | HEIGHT: 67 IN | SYSTOLIC BLOOD PRESSURE: 121 MMHG | TEMPERATURE: 97.2 F | RESPIRATION RATE: 18 BRPM

## 2025-06-12 DIAGNOSIS — N39.0 URINARY TRACT INFECTION: ICD-10-CM

## 2025-06-12 LAB
ANION GAP SERPL CALCULATED.3IONS-SCNC: 9 MMOL/L (ref 7–15)
BUN SERPL-MCNC: 45.1 MG/DL (ref 8–23)
CALCIUM SERPL-MCNC: 8.4 MG/DL (ref 8.8–10.4)
CHLORIDE SERPL-SCNC: 107 MMOL/L (ref 98–107)
CREAT SERPL-MCNC: 1.62 MG/DL (ref 0.51–0.95)
EGFRCR SERPLBLD CKD-EPI 2021: 33 ML/MIN/1.73M2
ERYTHROCYTE [DISTWIDTH] IN BLOOD BY AUTOMATED COUNT: 17.2 % (ref 10–15)
GLUCOSE BLDC GLUCOMTR-MCNC: 95 MG/DL (ref 70–99)
GLUCOSE SERPL-MCNC: 107 MG/DL (ref 70–99)
HCO3 SERPL-SCNC: 25 MMOL/L (ref 22–29)
HCT VFR BLD AUTO: 24.3 % (ref 35–47)
HGB BLD-MCNC: 7.6 G/DL (ref 11.7–15.7)
MAGNESIUM SERPL-MCNC: 1.9 MG/DL (ref 1.7–2.3)
MCH RBC QN AUTO: 28.4 PG (ref 26.5–33)
MCHC RBC AUTO-ENTMCNC: 31.3 G/DL (ref 31.5–36.5)
MCV RBC AUTO: 91 FL (ref 78–100)
PHOSPHATE SERPL-MCNC: 3 MG/DL (ref 2.5–4.5)
PLATELET # BLD AUTO: 230 10E3/UL (ref 150–450)
POTASSIUM SERPL-SCNC: 4.2 MMOL/L (ref 3.4–5.3)
RBC # BLD AUTO: 2.68 10E6/UL (ref 3.8–5.2)
SODIUM SERPL-SCNC: 141 MMOL/L (ref 135–145)
TACROLIMUS BLD-MCNC: 2.2 UG/L (ref 5–15)
TME LAST DOSE: ABNORMAL H
TME LAST DOSE: ABNORMAL H
WBC # BLD AUTO: 8.4 10E3/UL (ref 4–11)

## 2025-06-12 PROCEDURE — 250N000013 HC RX MED GY IP 250 OP 250 PS 637

## 2025-06-12 PROCEDURE — 36415 COLL VENOUS BLD VENIPUNCTURE: CPT | Performed by: NURSE PRACTITIONER

## 2025-06-12 PROCEDURE — 80197 ASSAY OF TACROLIMUS: CPT | Performed by: NURSE PRACTITIONER

## 2025-06-12 PROCEDURE — 250N000013 HC RX MED GY IP 250 OP 250 PS 637: Performed by: SURGERY

## 2025-06-12 PROCEDURE — 250N000012 HC RX MED GY IP 250 OP 636 PS 637: Performed by: NURSE PRACTITIONER

## 2025-06-12 PROCEDURE — 85014 HEMATOCRIT: CPT | Performed by: PHYSICIAN ASSISTANT

## 2025-06-12 PROCEDURE — 83735 ASSAY OF MAGNESIUM: CPT | Performed by: STUDENT IN AN ORGANIZED HEALTH CARE EDUCATION/TRAINING PROGRAM

## 2025-06-12 PROCEDURE — 99239 HOSP IP/OBS DSCHRG MGMT >30: CPT | Mod: GC | Performed by: STUDENT IN AN ORGANIZED HEALTH CARE EDUCATION/TRAINING PROGRAM

## 2025-06-12 PROCEDURE — G0463 HOSPITAL OUTPT CLINIC VISIT: HCPCS

## 2025-06-12 PROCEDURE — 250N000013 HC RX MED GY IP 250 OP 250 PS 637: Performed by: PHYSICIAN ASSISTANT

## 2025-06-12 PROCEDURE — 84100 ASSAY OF PHOSPHORUS: CPT | Performed by: STUDENT IN AN ORGANIZED HEALTH CARE EDUCATION/TRAINING PROGRAM

## 2025-06-12 PROCEDURE — 250N000012 HC RX MED GY IP 250 OP 636 PS 637: Performed by: STUDENT IN AN ORGANIZED HEALTH CARE EDUCATION/TRAINING PROGRAM

## 2025-06-12 PROCEDURE — 99207 PR SC NO CHARGE VISIT/PATIENT NOT SEEN: CPT | Performed by: NURSE PRACTITIONER

## 2025-06-12 PROCEDURE — 80048 BASIC METABOLIC PNL TOTAL CA: CPT

## 2025-06-12 RX ORDER — NYSTATIN 100000 [USP'U]/ML
1000000 SUSPENSION ORAL 4 TIMES DAILY
Qty: 60 ML | Refills: 0 | DISCHARGE
Start: 2025-06-12 | End: 2025-06-13

## 2025-06-12 RX ORDER — VITAMIN A 3000 MCG
20000 CAPSULE ORAL DAILY
DISCHARGE
Start: 2025-06-12 | End: 2025-06-30

## 2025-06-12 RX ORDER — METHOCARBAMOL 500 MG/1
500 TABLET, FILM COATED ORAL 4 TIMES DAILY PRN
DISCHARGE
Start: 2025-06-12 | End: 2025-06-20

## 2025-06-12 RX ADMIN — APIXABAN 2.5 MG: 2.5 TABLET, FILM COATED ORAL at 09:55

## 2025-06-12 RX ADMIN — LEVOTHYROXINE SODIUM 175 MCG: 0.17 TABLET ORAL at 09:55

## 2025-06-12 RX ADMIN — SERTRALINE HYDROCHLORIDE 50 MG: 50 TABLET ORAL at 09:55

## 2025-06-12 RX ADMIN — Medication 1 EACH: at 09:56

## 2025-06-12 RX ADMIN — THERA TABS 1 TABLET: TAB at 09:55

## 2025-06-12 RX ADMIN — URSODIOL 250 MG: 250 TABLET ORAL at 09:55

## 2025-06-12 RX ADMIN — Medication 2 MG: at 09:54

## 2025-06-12 RX ADMIN — NYSTATIN 1000000 UNITS: 100000 SUSPENSION ORAL at 09:55

## 2025-06-12 RX ADMIN — Medication 20000 UNITS: at 09:55

## 2025-06-12 RX ADMIN — CALCITRIOL CAPSULES 0.25 MCG 0.25 MCG: 0.25 CAPSULE ORAL at 09:55

## 2025-06-12 RX ADMIN — OMEGA-3-ACID ETHYL ESTERS 1 G: 1 CAPSULE, LIQUID FILLED ORAL at 09:55

## 2025-06-12 RX ADMIN — Medication 9 MG: at 09:55

## 2025-06-12 ASSESSMENT — ACTIVITIES OF DAILY LIVING (ADL)
ADLS_ACUITY_SCORE: 56

## 2025-06-12 NOTE — TELEPHONE ENCOUNTER
Notes for Infectious Disease, Dr Lee, consult while [place] was in patient 6/10/2025:    Discontinue antibiotics today given that she had good source control and significant clinical improvement.      Per primary, plan for discharge on Thursday 6/12/25 to TCU. While she is in TCU, Dr. Isidro will be able to address questions and concerns about her ID-related care.      3.   She has a follow up with Dr Lundberg on 7/02 at 4:00 PM    Voicemail left at at [facility] to update team that pt's iv antibiotics have been discontinued. [phone]    Jazz Bush RN

## 2025-06-12 NOTE — PLAN OF CARE
Shift Hours: 1900 - 0700    Assessment:  Body systems that were not at patient's baseline Respiratory, Cardiac, Peripheral Neurovascular, Gastrointestinal, Skin, and Musculoskeletal. Focused body system assessments documented in flowsheets.        Activity     Fall Risk Score: 60   Bed alarm on? Yes     Activity Assistance Provided: assistance, 2 people      Assistive Device Utilized: lift device    Pain: denies    Labs/RN Managed Protocols: K, Mag, Phos    Lines/Drains: none    Nutrition: regular    Goal Outcome Evaluation  Plan of Care Reviewed With: patient  Overall Patient Progress: no change       Pt refuses q2h repo, 2AM vitals and BG check. Colostomy intact with MIGUEL with staples CDI. Pain managed with lidocaine patch on back.      Pt has several personal belongings that will need to go with pt today ie. Walker, hearing aids, dentures.     Barriers to Discharge:   Discharge to TCU with planned ride at 1130. Black locker should leave with pt.

## 2025-06-12 NOTE — PROGRESS NOTES
[facility]    Progress Note - Colorectal Surgery Service       Date of Admission:  5/28/2025    Assessment & Plan: Surgery   Mrs. Thompson is a 75 year old female with a complex PMH, some of which includes CKD stage 3, CVA and DVT on anticoag, atrial fibrillation, DM2, biliary cirrhosis s/p prior liver transplant admitted for smoldering diverticulitis with 4.1cm abscess on prolonged IV abx as an outpatient.  Was admitted on 5/29 with worsening abdominal pain, hypotension due to gram negative septicemia, diarrhea.  Now s/p exploratory laparotomy, sigmoidectomy and end colostomy on 5/30/25, recovering appropriately with return of bowel function.    Polymicrobial bacteremia likely 2/2 GI source. Having return of bowel function. CT A/P on 6/6 without intra-abd fluid collections nor acute intra-abd pathology. Possible discharge to TCU today.     - Reg diet.  Recommend protein nutritional supplements as able.    - removed PATY drain on 6/9/25, continue staples   - restarted eliquis on 6/4  - Hx of liver transplant - immunosuppression per transplant immunology team (changed from sirolimus to tac). On prednisone 9mg daily   - High dose Vitamin A x30 days post-op for wound healing support in setting of immunosuppression  - Continue WOC ostomy cares   - Encourage out of bed and ambulation, incentive spirometer use   - ok to discharge from a CRS perspective     Diet: Snacks/Supplements Adult: Other; PRN per pt request; With Meals  Snacks/Supplements Adult: Other; Offer pt Gelatein Plus or 20 TID with meals - please ask what flavor she prefers with each meal; With Meals  Room Service  Advance Diet as Tolerated: Regular Diet Adult; Regular Diet Adult  Snacks/Supplements Adult: Glucerna; Between Meals    DVT Prophylaxis: Eliquis  Ron Catheter: Not present  Lines: None     Drains: PRESENT         - 1 Closed/Suction Drain(s)    - 1 Ureteral Drain/Stent(s)   Cardiac Monitoring: None  Code  "Status: Full Code      Clinically Significant Risk Factors          # Hyperchloremia: Highest Cl = 108 mmol/L in last 2 days, will monitor as appropriate          # Hypoalbuminemia: Lowest albumin = 2.4 g/dL at 5/29/2025  3:15 PM, will monitor as appropriate     # Hypertension: Noted on problem list            # Obesity: Estimated body mass index is 30.04 kg/m  as calculated from the following:    Height as of this encounter: 1.702 m (5' 7\").    Weight as of this encounter: 87 kg (191 lb 12.8 oz).        # Financial/Environmental Concerns: none         Social Drivers of Health   Tobacco Use: Medium Risk (5/27/2025)    Patient History     Smoking Tobacco Use: Former     Smokeless Tobacco Use: Never   Physical Activity: Insufficiently Active (11/9/2023)    Received from [employer]    Exercise Vital Sign     Days of Exercise per Week: 5 days     Minutes of Exercise per Session: 10 min         Disposition Plan     Medically Ready for Discharge: Anticipated Tomorrow to U    The patient's care was discussed with the Colorectal Team.    Zoë Silva MD   Medical Student  [facility]  Non-urgent messages: Securely message with [employer] (more info)  Text page via [facility] [facility] Paging/Directory     ______________________________________________________________________    Interval History   No acute events overnight. She had a little bit of nausea while working with PT yesterday.     Physical Exam   Vital Signs: Temp: 97.6  F (36.4  C) Temp src: Oral BP: 113/75 Pulse: 77   Resp: 20 SpO2: 98 % O2 Device: None (Room air)    Weight: 191 lbs 12.8 oz    Intake/Output Summary (Last 24 hours) at 6/12/2025 0658  Last data filed at 6/12/2025 0638  Gross per 24 hour   Intake 1434 ml   Output 1325 ml   Net 109 ml      General Appearance:  Alert and oriented, no acute distress  Respiratory: no increased work of breathing   Cardiovascular: regular rate   GI: soft, nondistended, non-tender to " palpation around ostomy in LLQ, ostomy pink and well perfused with gas as well as thickened stool, incision clean/dry/intact with staples  Skin: no rashes    Data     I have personally reviewed the following data over the past 24 hrs:    8.4  \   7.6 (L)   / 230     141 107 45.1 (H) /  107 (H)   4.2 25 1.62 (H) \       Imaging results reviewed over the past 24 hrs:   No results found for this or any previous visit (from the past 24 hours).

## 2025-06-12 NOTE — PROGRESS NOTES
[facility]  WOC Nurse Inpatient Assessment     Consulted for: new end colostomy    Summary:   5/30 - Pt's daughter Gloria at bedside for pouch change demo, would like to be present for education  6/3 - daughter not here - stool present in pouch  6/4: daughter not at bedside- unable to make it to hospital today. Pouch intact. Pt too tired for education today. Pt due for pouch change Thursday.   6/5: met with daughter and pt at bedside, pt able to use coloplast pouch closure much better than the manny one, pt able to remove pouch with assist. Daughter able to complete the rest of the pouch change with prompting. Placed convex coloplast pouch today but not sure if this is the correct fit, ordered some flat coloplast pouches with convex oval ring to try for tomorrow, plan to meet with daughter at 2pm tomorrow for continued teaching.   6/6: pouch intact, new supplies not available in room, ordered flat coloplast pouch and convex oval rings again. Pt and daughter prefer coloplast for the closure.  6/9: MCJ separation increased and with depth to medial side - will use [place] convex for now, can switch to Coloplast later if Pt will be emptying pouch independently. Discussed with colorectal provider at bedside  6/10: pouch leaking and WOC called to bedside. Pouch already removed. Per RN pouch leaking approx 3 and 9 o'clock. Will add large belt today. Per patient daughter will return for more education once stable POC is in place.   6/12: pouch changed today with noted worsening MCJ separation, took updated photo and paged colorectal to ensure they are aware of situation. They are and have no options for intervention currently. Applied hydrofera blue within separation and secured with additional barrier ring and luna paste, ensured pt had extra supplies for discharge     WOC nurse follow-up plan: daily     Patient History (according to provider note(s):      75 year old  female with history of liver transplant admitted for smoldering diverticulitis with 4.1cm abscess s/p open sigmoidectomy and end colostomy on 5/29/25,     Assessment:      Areas visualized during today's visit: Focused: Abdomen      6/2 LLQ        6/9 6/12  Assessment of new end Colostomy:  Diagnosis Pertinent to Stoma: Diverticulitis with perforation      Surgery Date: 5/29/25  Surgeon: Al Campbell MD       Hospital: [facility]  Pouching system in place on assessment today: [place] two piece, cut to fit, convex, ostomy ring, skin prep, and Hydrofera Blue Classic barrier ring.   Pouch last changed/wear time: 1 day  Reason for pouch change today: ostomy education and routine schedule  Effectiveness of current pouching/ supply plan: Attempting new system, will re-evaluate next assessment  Change made with ostomy management today: Yes  Pouching system placed today: [place] two piece, cut to fit, convex, ostomy ring, skin prep, and Hydrofera Blue Classic, ostomy paste.   Supplies: ordered Marina pouches and convex barriers, Hydrofera Blue Classic    Last photo: 6/9/25    Stoma [facility]: [facility]  Stoma size: 1 1/2 inches,   Stoma appearance: red, round, moist, retracted, and necrotic 11 -1 o'clock  Mucocutaneous junction:   circumferentially - irregular shape area with depth from 6-11 o'clock, approx 2 x 1.5 x 1 cm deep  Peristomal complication(s): none   Output: soft and brown stool, gas  Output volume emptied during visit: 100ml    Abdominal assessment: Soft  Surgical site(s): dressing dry and intact  NG still in place? No  Pain: denies  Is patient still on a PCA? No    Ostomy education assessment:  Participant of teaching session today: patient   Education completed today: Stoma assessment, Pouching system assessment , Adjustment of pouching plan, Ostomy accessory product use , Pouch emptying return demonstration, When to seek medical attention, and Lifestyle adjustments   Educational  materials/methods: Verbal, Demonstration, Return demonstration, and Addressed patient/caregiver questions/concerns  Education still needed: Pouch change return demonstration and Discharge instructions-- additional pouch change practice for daughter (plan is for TCU)  Learning Comprehension:   Psychosocial assessment: Pt says she wants to be able to change pouch after discharge - wants to return to independent living apartment with some assistance (was in TCU prior to this admission and plan is for TCU this discharge). Daughter can assist if needed, wants to be present for teaching  Patient readiness for education today: attentive and active participation,   Following today's visit: patient  and family member daughter is able to demonstrate;         1. How to empty their pouch? Yes, with moderate assist, and Needs additional practice         2. How to change their pouch? Yes, with moderate assist, and Needs additional practice          Preparation for discharge completed: No discharge preparation started yet-- still adjusting pouching plan   Preparation for discharge still needed: Placed prescription recommendations in discharge navigator for MD to sign, Ensured patient has extra supplies for discharge, Discussed how to order supplies after discharge , Ordered samples from  after gaining consent from patient/caregiver, Discussed how and when to make an outpatient WOC nurse appointment after discharge, Prepared for discharge home with home care, and Discuss signs/symptoms of when to seek medical attention  Pt support system on discharge: Daughter Gloria  [facility] recommend home care? By WOC RN if possible  Face to face time: 20 minutes    Wound location: peristomal - medial side      Last photo: 6/9/25  Wound due to: MCJ separation  Wound history/plan of care: early MCJ separation and small areas of necrosis - more significant wound noted 6/9   Wound base: 50 % Adipose tissue, 25 % Slough, Yellow, and Stringy  "brown, 25% red non granular     Palpation of the wound bed: normal      Drainage: moderate     Description of drainage: serous     Measurements (length x width x depth, in cm): irregular shape 2  x 1.5  x  1 cm      Tunneling: N/A     Undermining: N/A  Periwound skin: Intact      Color: normal and consistent with surrounding tissue      Temperature: normal   Odor: none  Pain: mild and facial expression of distress, tender with cleaning and measurements  Pain interventions prior to dressing change: slow and gentle cares  and distraction  Treatment goal: Infection control/prevention, Increase granulation, Protection, Remove necrotic tissue, and successful ostomy pouching  STATUS: evolving  Supplies ordered: ordered Hydrofera Blue Classic    Treatment Plan:     Peristomal wound: With pouch changes - clean open wound to medial side of stoma with wound spray. Cut piece of Hydrofera Blue Classic to size of open area. Wet Hydrofera Blue (#[id number]) with saline and squeeze out excess, place in wound base, apply small bead of ostomy paste to the medial edge of hydrafera blue/Pt's skin to help hold in/seal hydrafera blue, then cover with additional ostomy ring to secure, apply wafer and caulk open creases around barrier ring, then attach pouch        LLQ Colostomy pouching plan:   Pouching system: ostomy supplies pouches: [place] 57 FECAL ([id number]) ostomy supplies barrier: [place] 57mm SOFT CONVEX ([id number])  Accessories used: [facility] ostomy accessories: 2\" Shireen Ring (831502), [employer] Paste ([id number]), Large Belt (016310), and Hydrafera Blue Classist to fill open area on medial side of stoma (MCJ separation)   Frequency of pouch changes: Daily due to MCJ separation  WOC follow up plan: Daily Monday-Friday (as able)  Bedside RN interventions: Change pouch PRN if leaking using the supplies above, Empty pouch when 1/3 to 1/2 full, ensure to clean pouch outlet after emptying to prevent odor, Notify WOC for ongoing pouch leakage, Document " stoma appearance and output volume, color, and consistency every shift, and Encourage patient to empty pouch with assist     Orders: reviewed and updated     DATA:     Current support surface: Standard    Containment of urine/stool: Suction based external urinary catheter  and Colostomy pouch  BMI: Body mass index is 28.28 kg/m .   Active diet order: Orders Placed This Encounter      Clear Liquid Diet     Output: I/O last 3 completed shifts:  In: 1756.64 [P.O.:270; I.V.:1436.64; IV Piggyback:50]  Out: 1680 [Urine:1250; Drains:430]     Labs:   Recent Labs   Lab 05/30/25  0412 05/29/25  1515 05/29/25  1051 05/29/25  0546   ALBUMIN  --  2.4*  --   --    HGB 8.6* 8.5*   < > 7.6*   INR  --   --   --  1.49*   WBC 20.2* 20.2*  --  9.1   A1C  --  6.3*  --   --     < > = values in this interval not displayed.     Pressure injury risk assessment:   Sensory Perception: 2-->very limited  Moisture: 3-->occasionally moist  Activity: 1-->bedfast  Mobility: 2-->very limited  Nutrition: 2-->probably inadequate  Friction and Shear: 2-->potential problem  Shashank Score: 12      Pager no longer is use, please contact through [facility] group: [facility] Nurse [place]   Dept. Office Number: [phone]

## 2025-06-12 NOTE — PROGRESS NOTES
[facility] GERIATRICS    PRIMARY CARE PROVIDER AND CLINIC:  Daniel Hidalgo MD, [address] [facility] [address]  Chief Complaint   Patient presents with    Hospital F/U      [place] Medical Record Number:  [id number]  Place of Service where encounter took place:  [facility] (TCU)(SNF) [[id number]]    Luz Thompson  is a 75 year old  (1950), admitted to the above facility from  [facility]. Hospital stay 5/28/25 through 6/12/25..   HPI:    Luz Thompson is a 75 year old female who was admitted on 5/28/2025 with a past medical history of atrial fibrillation, DVT on eliquis, CVA, CKD III, HFpEF (TTE 3/9/25 EF 60-65%), T2DM, biliary cirrhosis s/p liver transplant in 2002, EBV, HTN, HLD, Sjogren's syndrome, Basal Cell Carincoma s/p MOHS on 4/8/25, thyroid cancer s/p thyroidectomy in 2010, and diverticulitis with recent admission from 4/14-4/23 for ESBL UTI & E. Faecalis bacteremia related to sigmoid microperforation who was admitted for abdominal pain and hypotension, found to have smoldering diverticulitis with a 4.1cm abscess status post open sigmoidectomy and end colostomy on 5/30/25, with Psuedomonas positive blood cultures drawn 5/28. She received ongoing IV antibiotics due to bacteremia (GPC and VRE) and was slowly weaned off stress dose steroids     The primary encounter diagnosis was Diverticulitis of colon. Diagnoses of Intra-abdominal infection, Hx of sepsis, Hypotension, unspecified hypotension type, S/P laparoscopic-assisted sigmoidectomy, Colostomy present (H), Physical deconditioning, Generalized muscle weakness, Chronic shoulder pain, unspecified laterality, Age-related osteoporosis without current pathological fracture, Bilateral low back pain without sciatica, unspecified chronicity, Chronic anemia, 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, Hypothyroidism, unspecified type, S/P Mohs surgery for basal  cell carcinoma, Hypertension goal BP (blood pressure) < 140/80, Nocturnal hypoxemia, Hyperlipidemia LDL goal <70, Stage 3b chronic kidney disease (H), Chronic diastolic heart failure (H), RASHIDA (obstructive sleep apnea), Status post liver transplantation (H), Type 2 diabetes mellitus with stage 3b chronic kidney disease, without long-term current use of insulin (H), PAF (paroxysmal atrial fibrillation) (H), Depression, unspecified depression type, Leukocytosis, unspecified type, Sjogren's syndrome, with unspecified organ involvement, Protein malnutrition, Immunocompromised, Seborrheic dermatitis, Papillary thyroid carcinoma (H), Epistaxis, Vaginal bleeding, Benign essential hypertension, Pleural effusion, SOB (shortness of breath), Paroxysmal atrial fibrillation (H), TIA (transient ischemic attack), DERREK (acute kidney injury), Acute tubular necrosis, History of stent insertion of renal artery, Anemia due to blood loss, acute, Thrombocytopenia, Coagulopathy, Slow transit constipation, Nausea, Gastroesophageal reflux disease with esophagitis, unspecified whether hemorrhage, Personal history of fall, and Chronic kidney disease, stage 4 (severe) (H) were also pertinent to this visit.    ***    BP Readings from Last 3 Encounters:   06/12/25 121/70   06/12/25 105/75   05/27/25 128/64     Wt Readings from Last 5 Encounters:   06/12/25 85.6 kg (188 lb 11.2 oz)   06/11/25 87 kg (191 lb 12.8 oz)   05/27/25 82.7 kg (182 lb 6.4 oz)   05/27/25 81.6 kg (180 lb)   05/22/25 82.7 kg (182 lb 6.4 oz)     CODE STATUS/ADVANCE DIRECTIVES DISCUSSION:  Prior  CPR/Full code   ALLERGIES:   Allergies   Allergen Reactions    Fluconazole Hives and Itching     Full body hives  **Intradermal skin testing negative**  [See intradermal skin testing results from 8/2/2019]    Mycophenolate Diarrhea and Nausea and Vomiting     Patient stated it was chronic and lasted months      Penicillins Anaphylaxis, Hives, Itching and Rash     **Intradermal skin  "testing negative**  [See intradermal skin testing results from 8/2/2019]      Simvastatin Muscle Pain (Myalgia)     severe  Other reaction(s): Myalgia caused by statin    Methotrexate Other (See Comments)     Other reaction(s): Sore  Sores in mouth, esophagus, and stomach.       Morphine And Codeine Itching and Other (See Comments)     Psych disturbance  Other reaction(s): Confusion, Mood alteration    Quinolones Anxiety, Dizziness, Headache, Other (See Comments), Palpitations and Unknown     Other reaction(s): Hyperactive behavior, Lightheadedness, Mood alteration    Dizzy, light headed    Dizziness, shaky, and jumpy    Capsules, Empty Gelatin [Gelatin]     Carvedilol Diarrhea     Per pt - severe diarrhea and LE swelling  + tolerating Toprol    Lansoprazole Diarrhea    Strawberry Extract     Azithromycin Itching     [See intradermal skin testing results from 8/2/2019]    Bactrim [Sulfamethoxazole-Trimethoprim] Other (See Comments)     Numb mouth, tingling lips (treated with anti-histamines)    Cephalosporins Itching     [See intradermal skin testing results from 8/2/2019]    Ciprofloxacin Hcl Other (See Comments) and Dizziness     Insomnia, mood lability, Irregular heart beat         Doxycycline Itching and Unknown     [See intradermal skin testing results from 8/2/2019]    Lisinopril Cough    Omeprazole Itching    Tigecycline Other (See Comments)     Perioral numbness in 2025 with long-term use    Tolectin [Nsaids] Rash    Tolmetin Rash and Itching    Tramadol Rash, Hives and Itching      PAST MEDICAL HISTORY:   Past Medical History:   Diagnosis Date    Anemia of chronic disease 10/17/2011    Anxiety     CKD (chronic kidney disease) stage 3, GFR 30-59 ml/min (H) 04/04/2012    Coccidioidomycosis, 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     Deep " venous thrombosis     Diverticulosis of sigmoid colon 12/21/2013    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, history of     Received Rituxan during Summer of 2016    Glaucoma     H/O esophageal varices     Hearing loss     Hyperlipidemia 04/10/2012    Says that she does not have it anymore, not on meds    Hypertension     Hypertriglyceridemia     Liver replaced by transplant (H) 10/17/2011    Dr. 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      PAST SURGICAL HISTORY:   has a past surgical history that includes Thyroidectomy (03/2010); appendectomy (1961); Cholecystectomy (1991); KNEE SCOPE,SINGLE MENISECTOMY (Right, 08/10/2012); Transplant liver recipient living unrelated (05/2002); knee surgery (1966); picc insertion (09/18/2013); Endoscopic retrograde cholangiopancreatogram (09/19/2013); cataract iol, rt/lt; picc insertion (02/21/2014); Colonoscopy (03/10/2014); Endobronchial ultrasound flexible (N/A, 09/29/2017); ear drum repair; Cystoscopy; Insert stent ureter (Bilateral, 5/29/2025); and Colectomy with colostomy, combined (N/A, 5/29/2025).  FAMILY HISTORY: family history includes Allergies in her son; Alzheimer Disease in her paternal grandfather; Breast Cancer in an other family member; Cancer - colorectal in her maternal grandmother; Cerebrovascular Disease in her paternal grandmother; Endometrial Cancer in her mother; Glaucoma in her maternal grandfather; Heart Disease in her maternal grandfather and paternal  grandfather; Hyperlipidemia in her mother; Hypertension in her mother and paternal grandmother; Macular Degeneration in her father; Neurologic Disorder in her daughter; Prostate Cancer in her father.  SOCIAL HISTORY:   reports that she quit smoking about 40 years ago. Her smoking use included cigarettes. She started smoking about 58 years ago. She has a 18 pack-year smoking history. She has never used smokeless tobacco. She reports current alcohol use. She reports that she does not use drugs.  Patient's living condition: lives alone    Post Discharge Medication Reconciliation Status:   MED REC REQUIRED  Post Medication Reconciliation Status:  Discharge medications reconciled and changed, see notes/orders       Current Outpatient Medications   Medication Sig Dispense Refill    acetaminophen (TYLENOL) 500 MG tablet Take 1,000 mg by mouth 2 times daily. During 4/16/25 med recc pt states take BID everyday      albuterol (PROAIR HFA/PROVENTIL HFA/VENTOLIN HFA) 108 (90 Base) MCG/ACT inhaler Inhale 2 puffs into the lungs every 4 hours as needed for shortness of breath, wheezing or cough. 18 g 0    [Paused] amLODIPine (NORVASC) 5 MG tablet Take 1 tablet (5 mg) by mouth daily. HOLD if SBP<100 30 tablet 2    apixaban ANTICOAGULANT (ELIQUIS) 2.5 MG tablet Take 1 tablet (2.5 mg) by mouth 2 times daily. 60 tablet 0    calcitRIOL (ROCALTROL) 0.25 MCG capsule Take 1 capsule (0.25 mcg) by mouth daily. 90 capsule 1    calcium carbonate (TUMS) 500 MG chewable tablet Take 2 tablets (1,000 mg) by mouth every 4 hours as needed for heartburn.      Continuous Glucose Sensor (FREESTYLE NINO 2 SENSOR) MISC Change every 14 days. 2 each 5    D-MANNOSE PO Take 1 Scoop by mouth 2 times daily.      diclofenac (VOLTAREN) 1 % topical gel Apply 4 g topically 4 times daily.      diphenhydrAMINE (BENADRYL) 25 MG tablet Take 25 mg by mouth every 6 hours as needed for itching or allergies.      EPINEPHrine (ANY BX GENERIC EQUIV) 0.3 MG/0.3ML injection  2-pack Inject 0.3 mLs (0.3 mg) into the muscle as needed for anaphylaxis. May repeat one time in 5-15 minutes if response to initial dose is inadequate. 2 each 1    estradiol (ESTRACE) 0.1 MG/GM vaginal cream Place vaginally three times a week.      evolocumab (REPATHA SURECLICK) 140 MG/ML prefilled autoinjector Inject 1 mL (140 mg) subcutaneously every 14 days. . Please have fasting labs drawn for further refills. 6 mL 3    ezetimibe (ZETIA) 10 MG tablet Take 1 tablet (10 mg) by mouth daily. 90 tablet 3    Ferrous Sulfate 324 MG TBEC Take 1 tablet by mouth daily.      folic acid (FOLVITE) 1 MG tablet TAKE 1 TABLET BY MOUTH DAILY 90 tablet 3    gabapentin (NEURONTIN) 250 MG/5ML solution Take 6 mLs (300 mg) by mouth at bedtime 180 mL 0    Glucagon (GVOKE HYPOPEN) 1 MG/0.2ML pen Inject the contents of 1 device under the skin into lower abdomen, outer thigh, or outer upper arm as needed for hypoglycemia. If no response after 15 minutes, additional 1 mg dose from a new device may be injected while waiting for emergency assistance.      glucose (BD GLUCOSE) 5 g chewable tablet Take 2 tablets (10 g) by mouth as needed (low blood sugar) 40 tablet 1    glucose 40 % (400 mg/mL) gel Take 15-30 g by mouth every 15 minutes as needed for low blood sugar.      [Paused] hydrALAZINE (APRESOLINE) 50 MG tablet Take 1 tablet (50 mg) by mouth 3 times daily. hold if SBP <100mmHg.      ketoconazole (NIZORAL) 2 % external shampoo Apply topically twice a week. Lather in the shower, wait 3-5 minutes before rinsing.      levothyroxine (SYNTHROID/LEVOTHROID) 175 MCG tablet Take 1 tablet (175 mcg) by mouth daily. 90 tablet 1    linagliptin (TRADJENTA) 5 MG TABS tablet Take 1 tablet (5 mg) by mouth daily. 90 tablet 1    magnesium citrate 1.745 GM/30ML solution Drink 1 bottle at 8pm the night before surgery 296 mL 0    methocarbamol (ROBAXIN) 500 MG tablet Take 1 tablet (500 mg) by mouth 4 times daily as needed for muscle spasms.      [Paused]  metoprolol succinate ER (TOPROL XL) 25 MG 24 hr tablet Take 25 mg by mouth daily. HOLD if SBP<100 and/or HR<55      Multiple Vitamins-Minerals (PRESERVISION AREDS 2) CAPS Take 1 capsule by mouth 2 times daily      neomycin (MYCIFRADIN) 500 MG tablet Take 2 tablets (1,000 mg) by mouth every 6 hours. At 8:00 am, 2:00 pm, 8:00 pm the day prior to your surgery with flagyl and zofran. 6 tablet 0    NONFORMULARY Apply 1 Application topically daily as needed. Momma Bear Nerve Lotion - pt uses on feet      Nutrisource Fiber PO packet Take 1 packet by mouth daily. Benefiber      nystatin (MYCOSTATIN) 591405 UNIT/ML suspension Take 10 mLs (1,000,000 Units) by mouth 4 times daily. 60 mL 0    omega-3 acid ethyl esters (LOVAZA) 1 g capsule Take 1 capsule (1 g) by mouth 2 times daily.      ondansetron (ZOFRAN ODT) 4 MG ODT tab Take 1 tablet (4 mg) by mouth every 6 hours as needed.      ondansetron (ZOFRAN) 4 MG tablet Take 1 tablet (4 mg) by mouth every 6 hours. At 8:00 am, 2:00 pm, 8:00 pm the day prior to your surgery with neomycin and flagyl. 3 tablet 0    oxymetazoline (AFRIN) 0.05 % nasal spray Spray 0.2 mLs (2 sprays) into both nostrils 2 times daily as needed for congestion. 30 mL 0    polyethylene glycol (MIRALAX) 17 GM/Dose powder Please take 238 grams mixed with 64 oz of Gatorade at 4pm the night before surgery 238 g 0    predniSONE (DELTASONE) 1 MG tablet Take 4 tablets (4 mg) by mouth daily. Take 4mg (1mg tablets x4) and Take with 5mg tablet to equal 9mg daily 90 tablet 0    predniSONE (DELTASONE) 5 MG tablet Take 1 tablet (5 mg) by mouth daily. Take with 5mg tablet with 4mg (1mg tablets x4) to equal 9mg daily 30 tablet 0    PROGRAF (BRAND) 1 MG/ML suspension Take 2 mLs (2 mg) by mouth 2 times daily.      sertraline (ZOLOFT) 50 MG tablet Take 1 tablet (50 mg) by mouth daily. 30 tablet 0    [Paused] sirolimus (GENERIC EQUIVALENT) 1 MG tablet Take 1 tablet (1 mg) by mouth daily. 90 tablet 3    [Paused] tacrolimus  "(GENERIC EQUIVALENT) 0.5 MG capsule Take 1 capsule (0.5 mg) by mouth every 12 hours. 60 capsule 11    triamcinolone (KENALOG) 0.1 % external ointment Apply topically 2 times daily. Use 2 weeks or less. 80 g 0    ursodiol (ACTIGALL) 250 MG tablet Take 1 tablet (250 mg) by mouth 2 times daily. 180 tablet 3    vitamin A 3 MG (40741 UNITS) capsule Take 2 capsules (20,000 Units) by mouth daily for 18 days.       No current facility-administered medications for this visit.       ROS:  10 point ROS of systems including Constitutional, Eyes, Respiratory, Cardiovascular, Gastroenterology, Genitourinary, Integumentary, Musculoskeletal, Psychiatric were all negative except for pertinent positives noted in my HPI.    Vitals:  /70   Pulse 100   Temp 97.2  F (36.2  C)   Resp 18   Ht 1.702 m (5' 7\")   Wt 85.6 kg (188 lb 11.2 oz)   LMP 06/01/1988 (Approximate)   SpO2 94%   BMI 29.55 kg/m    Exam:  GENERAL APPEARANCE:  {[facility] GENERAL APPEARANCE:[id number]}  ENT:  {[facility] ENT PE:[id number]::\"Mouth and posterior oropharynx normal, moist mucous membranes\"}  EYES:  {[facility] EYES PE :[id number]::\"EOM, conjunctivae, lids, pupils and irises normal\"}  RESP:  {[facility] RESP PE:[id number]}  CV:  {[facility] CV PE:[id number]}  ABDOMEN:  {[facility] GI PE:[id number]::\"normal bowel sounds, soft, nontender, no hepatosplenomegaly or other masses\"}  M/S:   {[facility] M/S PE:[id number]}  SKIN:  {[facility] PE:[id number]}  NEURO:   {[facility] NEURO PE:[id number]}  PSYCH:  {[facility] PSYCH PE:[id number]}    Lab/Diagnostic data:  Recent Results (from the past 240 hours)   Glucose by meter    Collection Time: 06/02/25  5:10 PM   Result Value Ref Range    GLUCOSE BY METER POCT 205 (H) 70 - 99 mg/dL   Glucose by meter    Collection Time: 06/02/25 10:06 PM   Result Value Ref Range    GLUCOSE BY METER POCT 241 (H) 70 - 99 mg/dL   Glucose by meter    Collection Time: 06/03/25  2:00 AM   Result Value Ref Range    GLUCOSE BY METER POCT 214 (H) " 70 - 99 mg/dL   EKG 12 lead    Collection Time: 06/03/25  3:11 AM   Result Value Ref Range    Systolic Blood Pressure  mmHg    Diastolic Blood Pressure  mmHg    Ventricular Rate 86 BPM    Atrial Rate 81 BPM    MI Interval 200 ms    QRS Duration 80 ms     ms    QTc 435 ms    P Axis 55 degrees    R AXIS 19 degrees    T Axis 19 degrees    Interpretation ECG       Sinus rhythm with Premature supraventricular complexes  Otherwise normal ECG  When compared with ECG of 29-May-2025 18:31,  Sinus rhythm has replaced Atrial fibrillation  Vent. rate has increased by  47 bpm  QT has lengthened  Confirmed by MD SHIRLEY, SUHAS ([id number]) on 6/3/2025 9:27:58 AM     Blood Culture Peripheral blood (BC) Hand, Left    Collection Time: 06/03/25  5:56 AM    Specimen: Hand, Left; Peripheral blood (BC)   Result Value Ref Range    Culture No Growth    Basic metabolic panel    Collection Time: 06/03/25  5:57 AM   Result Value Ref Range    Sodium 142 135 - 145 mmol/L    Potassium 3.8 3.4 - 5.3 mmol/L    Chloride 109 (H) 98 - 107 mmol/L    Carbon Dioxide (CO2) 23 22 - 29 mmol/L    Anion Gap 10 7 - 15 mmol/L    Urea Nitrogen 46.5 (H) 8.0 - 23.0 mg/dL    Creatinine 1.41 (H) 0.51 - 0.95 mg/dL    GFR Estimate 38 (L) >60 mL/min/1.73m2    Calcium 8.0 (L) 8.8 - 10.4 mg/dL    Glucose 217 (H) 70 - 99 mg/dL   Vitamin A    Collection Time: 06/03/25  5:57 AM   Result Value Ref Range    Vitamin A 0.52 0.30 - 1.20 mg/L    Retinol Palmitate 0.45 (H) 0.00 - 0.10 mg/L    Vitamin A Interp See Note    Magnesium    Collection Time: 06/03/25  5:57 AM   Result Value Ref Range    Magnesium 2.3 1.7 - 2.3 mg/dL   Phosphorus    Collection Time: 06/03/25  5:57 AM   Result Value Ref Range    Phosphorus 2.4 (L) 2.5 - 4.5 mg/dL   Blood Culture Peripheral blood (BC) Hand, Left    Collection Time: 06/03/25  5:59 AM    Specimen: Hand, Left; Peripheral blood (BC)   Result Value Ref Range    Culture No Growth    CBC with platelets    Collection Time: 06/03/25  6:57 AM    Result Value Ref Range    WBC Count 9.5 4.0 - 11.0 10e3/uL    RBC Count 2.61 (L) 3.80 - 5.20 10e6/uL    Hemoglobin 7.3 (L) 11.7 - 15.7 g/dL    Hematocrit 22.9 (L) 35.0 - 47.0 %    MCV 88 78 - 100 fL    MCH 28.0 26.5 - 33.0 pg    MCHC 31.9 31.5 - 36.5 g/dL    RDW 18.0 (H) 10.0 - 15.0 %    Platelet Count 199 150 - 450 10e3/uL   Tacrolimus by Tandem Mass Spectrometry    Collection Time: 06/03/25  6:57 AM   Result Value Ref Range    Tacrolimus by Tandem Mass Spectrometry 1.0 (L) 5.0 - 15.0 ug/L    Tacrolimus Last Dose Date      Tacrolimus Last Dose Time     Glucose by meter    Collection Time: 06/03/25  8:28 AM   Result Value Ref Range    GLUCOSE BY METER POCT 181 (H) 70 - 99 mg/dL   Glucose by meter    Collection Time: 06/03/25 12:53 PM   Result Value Ref Range    GLUCOSE BY METER POCT 216 (H) 70 - 99 mg/dL   Glucose by meter    Collection Time: 06/03/25  5:16 PM   Result Value Ref Range    GLUCOSE BY METER POCT 220 (H) 70 - 99 mg/dL   Glucose by meter    Collection Time: 06/03/25  9:51 PM   Result Value Ref Range    GLUCOSE BY METER POCT 231 (H) 70 - 99 mg/dL   EKG 12-lead, tracing only    Collection Time: 06/03/25 11:50 PM   Result Value Ref Range    Systolic Blood Pressure  mmHg    Diastolic Blood Pressure  mmHg    Ventricular Rate 39 BPM    Atrial Rate 39 BPM    CO Interval 178 ms    QRS Duration 86 ms     ms    QTc 379 ms    P Axis 68 degrees    R AXIS 42 degrees    T Axis 49 degrees    Interpretation ECG       Sinus bradycardia  Abnormal ECG  When compared with ECG of 03-Jun-2025 03:11,  Premature supraventricular complexes are no longer Present  Vent. rate has decreased by  47 bpm  Nonspecific T wave abnormality no longer evident in Inferior leads  QT has shortened  Confirmed by MD JUAN DANIEL, HOLLIS ([id number]) on 6/4/2025 10:35:35 PM     Glucose by meter    Collection Time: 06/04/25  2:10 AM   Result Value Ref Range    GLUCOSE BY METER POCT 220 (H) 70 - 99 mg/dL   Basic metabolic panel    Collection Time: 06/04/25   5:04 AM   Result Value Ref Range    Sodium 141 135 - 145 mmol/L    Potassium 4.7 3.4 - 5.3 mmol/L    Chloride 108 (H) 98 - 107 mmol/L    Carbon Dioxide (CO2) 22 22 - 29 mmol/L    Anion Gap 11 7 - 15 mmol/L    Urea Nitrogen 43.3 (H) 8.0 - 23.0 mg/dL    Creatinine 1.39 (H) 0.51 - 0.95 mg/dL    GFR Estimate 39 (L) >60 mL/min/1.73m2    Calcium 8.0 (L) 8.8 - 10.4 mg/dL    Glucose 228 (H) 70 - 99 mg/dL   Magnesium    Collection Time: 06/04/25  5:04 AM   Result Value Ref Range    Magnesium 2.2 1.7 - 2.3 mg/dL   CBC with platelets    Collection Time: 06/04/25  6:11 AM   Result Value Ref Range    WBC Count 11.5 (H) 4.0 - 11.0 10e3/uL    RBC Count 2.70 (L) 3.80 - 5.20 10e6/uL    Hemoglobin 7.4 (L) 11.7 - 15.7 g/dL    Hematocrit 23.3 (L) 35.0 - 47.0 %    MCV 86 78 - 100 fL    MCH 27.4 26.5 - 33.0 pg    MCHC 31.8 31.5 - 36.5 g/dL    RDW 18.1 (H) 10.0 - 15.0 %    Platelet Count 243 150 - 450 10e3/uL   Hepatic panel    Collection Time: 06/04/25  6:11 AM   Result Value Ref Range    Protein Total 5.5 (L) 6.4 - 8.3 g/dL    Albumin 2.7 (L) 3.5 - 5.2 g/dL    Bilirubin Total 0.2 <=1.2 mg/dL    Alkaline Phosphatase 137 40 - 150 U/L    AST 21 0 - 45 U/L    ALT 20 0 - 50 U/L    Bilirubin Direct 0.12 0.00 - 0.45 mg/dL   CBC with platelets and differential    Collection Time: 06/04/25  6:11 AM   Result Value Ref Range    WBC Count 11.5 (H) 4.0 - 11.0 10e3/uL    RBC Count 2.70 (L) 3.80 - 5.20 10e6/uL    Hemoglobin 7.4 (L) 11.7 - 15.7 g/dL    Hematocrit 23.3 (L) 35.0 - 47.0 %    MCV 86 78 - 100 fL    MCH 27.4 26.5 - 33.0 pg    MCHC 31.8 31.5 - 36.5 g/dL    RDW 18.1 (H) 10.0 - 15.0 %    Platelet Count 243 150 - 450 10e3/uL   RBC and Platelet Morphology    Collection Time: 06/04/25  6:11 AM   Result Value Ref Range    RBC Morphology Confirmed RBC Indices     Platelet Assessment  Automated Count Confirmed. Platelet morphology is normal.     Automated Count Confirmed. Platelet morphology is normal.    Lolis Cells Moderate (A) None Seen     Elliptocytes Slight (A) None Seen    Target Cells Slight (A) None Seen   Manual Differential    Collection Time: 06/04/25  6:11 AM   Result Value Ref Range    % Neutrophils 92 %    % Lymphocytes 3 %    % Monocytes 1 %    % Eosinophils 0 %    % Basophils 0 %    % Metamyelocytes 2 %    % Myelocytes 2 %    NRBCs per 100 WBC 1 (H) <=0 %    Absolute Neutrophils 10.6 (H) 1.6 - 8.3 10e3/uL    Absolute Lymphocytes 0.4 (L) 0.8 - 5.3 10e3/uL    Absolute Monocytes 0.1 0.0 - 1.3 10e3/uL    Absolute Eosinophils 0.0 0.0 - 0.7 10e3/uL    Absolute Basophils 0.0 0.0 - 0.2 10e3/uL    Absolute Metamyelocytes 0.2 (H) <=0.0 10e3/uL    Absolute Myelocytes 0.2 (H) <=0.0 10e3/uL    Absolute NRBCs 0.1 (H) <=0.0 10e3/uL   Glucose by meter    Collection Time: 06/04/25  8:34 AM   Result Value Ref Range    GLUCOSE BY METER POCT 204 (H) 70 - 99 mg/dL   Phosphorus    Collection Time: 06/04/25  8:55 AM   Result Value Ref Range    Phosphorus 2.6 2.5 - 4.5 mg/dL   Echo Complete    Collection Time: 06/04/25 11:42 AM   Result Value Ref Range    LVEF  60-65%    Glucose by meter    Collection Time: 06/04/25 12:47 PM   Result Value Ref Range    GLUCOSE BY METER POCT 226 (H) 70 - 99 mg/dL   Glucose by meter    Collection Time: 06/04/25  4:44 PM   Result Value Ref Range    GLUCOSE BY METER POCT 225 (H) 70 - 99 mg/dL   Glucose by meter    Collection Time: 06/04/25 10:14 PM   Result Value Ref Range    GLUCOSE BY METER POCT 211 (H) 70 - 99 mg/dL   Glucose by meter    Collection Time: 06/05/25  1:35 AM   Result Value Ref Range    GLUCOSE BY METER POCT 188 (H) 70 - 99 mg/dL   Basic metabolic panel    Collection Time: 06/05/25  5:19 AM   Result Value Ref Range    Sodium 144 135 - 145 mmol/L    Potassium 3.4 3.4 - 5.3 mmol/L    Chloride 104 98 - 107 mmol/L    Carbon Dioxide (CO2) 29 22 - 29 mmol/L    Anion Gap 11 7 - 15 mmol/L    Urea Nitrogen 35.2 (H) 8.0 - 23.0 mg/dL    Creatinine 1.22 (H) 0.51 - 0.95 mg/dL    GFR Estimate 46 (L) >60 mL/min/1.73m2    Calcium  8.2 (L) 8.8 - 10.4 mg/dL    Glucose 191 (H) 70 - 99 mg/dL   CBC with platelets    Collection Time: 06/05/25  5:19 AM   Result Value Ref Range    WBC Count 11.4 (H) 4.0 - 11.0 10e3/uL    RBC Count 3.35 (L) 3.80 - 5.20 10e6/uL    Hemoglobin 9.2 (L) 11.7 - 15.7 g/dL    Hematocrit 29.5 (L) 35.0 - 47.0 %    MCV 88 78 - 100 fL    MCH 27.5 26.5 - 33.0 pg    MCHC 31.2 (L) 31.5 - 36.5 g/dL    RDW 17.4 (H) 10.0 - 15.0 %    Platelet Count 288 150 - 450 10e3/uL   Magnesium    Collection Time: 06/05/25  5:19 AM   Result Value Ref Range    Magnesium 2.0 1.7 - 2.3 mg/dL   Blood Culture Peripheral blood (BC) Hand, Left    Collection Time: 06/05/25  5:19 AM    Specimen: Hand, Left; Peripheral blood (BC)   Result Value Ref Range    Culture No Growth    Phosphorus    Collection Time: 06/05/25  5:19 AM   Result Value Ref Range    Phosphorus 2.4 (L) 2.5 - 4.5 mg/dL   Blood Culture Peripheral blood (BC) Hand, Left    Collection Time: 06/05/25  6:16 AM    Specimen: Hand, Left; Peripheral blood (BC)   Result Value Ref Range    Culture No Growth    Glucose by meter    Collection Time: 06/05/25  8:42 AM   Result Value Ref Range    GLUCOSE BY METER POCT 169 (H) 70 - 99 mg/dL   Glucose by meter    Collection Time: 06/05/25  2:15 PM   Result Value Ref Range    GLUCOSE BY METER POCT 155 (H) 70 - 99 mg/dL   Glucose by meter    Collection Time: 06/05/25  5:36 PM   Result Value Ref Range    GLUCOSE BY METER POCT 131 (H) 70 - 99 mg/dL   Glucose by meter    Collection Time: 06/05/25 10:05 PM   Result Value Ref Range    GLUCOSE BY METER POCT 156 (H) 70 - 99 mg/dL   Glucose by meter    Collection Time: 06/06/25  1:35 AM   Result Value Ref Range    GLUCOSE BY METER POCT 150 (H) 70 - 99 mg/dL   Basic metabolic panel    Collection Time: 06/06/25  4:28 AM   Result Value Ref Range    Sodium 144 135 - 145 mmol/L    Potassium 3.0 (L) 3.4 - 5.3 mmol/L    Chloride 105 98 - 107 mmol/L    Carbon Dioxide (CO2) 28 22 - 29 mmol/L    Anion Gap 11 7 - 15 mmol/L     Urea Nitrogen 39.0 (H) 8.0 - 23.0 mg/dL    Creatinine 1.49 (H) 0.51 - 0.95 mg/dL    GFR Estimate 36 (L) >60 mL/min/1.73m2    Calcium 7.8 (L) 8.8 - 10.4 mg/dL    Glucose 122 (H) 70 - 99 mg/dL   CBC with platelets    Collection Time: 06/06/25  5:27 AM   Result Value Ref Range    WBC Count 14.6 (H) 4.0 - 11.0 10e3/uL    RBC Count 2.69 (L) 3.80 - 5.20 10e6/uL    Hemoglobin 7.4 (L) 11.7 - 15.7 g/dL    Hematocrit 23.9 (L) 35.0 - 47.0 %    MCV 89 78 - 100 fL    MCH 27.5 26.5 - 33.0 pg    MCHC 31.0 (L) 31.5 - 36.5 g/dL    RDW 17.4 (H) 10.0 - 15.0 %    Platelet Count 305 150 - 450 10e3/uL   Tacrolimus by Tandem Mass Spectrometry    Collection Time: 06/06/25  5:29 AM   Result Value Ref Range    Tacrolimus by Tandem Mass Spectrometry 1.2 (L) 5.0 - 15.0 ug/L    Tacrolimus Last Dose Date      Tacrolimus Last Dose Time     Phosphorus    Collection Time: 06/06/25  5:29 AM   Result Value Ref Range    Phosphorus 3.0 2.5 - 4.5 mg/dL   Magnesium    Collection Time: 06/06/25  5:29 AM   Result Value Ref Range    Magnesium 1.7 1.7 - 2.3 mg/dL   Glucose by meter    Collection Time: 06/06/25  8:24 AM   Result Value Ref Range    GLUCOSE BY METER POCT 96 70 - 99 mg/dL   Hemoglobin    Collection Time: 06/06/25  8:50 AM   Result Value Ref Range    Hemoglobin 7.5 (L) 11.7 - 15.7 g/dL    MCV 89 78 - 100 fL   Haptoglobin    Collection Time: 06/06/25  8:50 AM   Result Value Ref Range    Haptoglobin 349 (H) 30 - 200 mg/dL   Bilirubin Direct and Total    Collection Time: 06/06/25  8:50 AM   Result Value Ref Range    Bilirubin Total 0.4 <=1.2 mg/dL    Bilirubin Direct 0.23 0.00 - 0.45 mg/dL   Lactate Dehydrogenase    Collection Time: 06/06/25  8:50 AM   Result Value Ref Range    Lactate Dehydrogenase 275 (H) 0 - 250 U/L   Glucose by meter    Collection Time: 06/06/25  2:05 PM   Result Value Ref Range    GLUCOSE BY METER POCT 173 (H) 70 - 99 mg/dL   Glucose by meter    Collection Time: 06/06/25  4:47 PM   Result Value Ref Range    GLUCOSE BY METER  POCT 221 (H) 70 - 99 mg/dL   Potassium    Collection Time: 06/06/25  7:45 PM   Result Value Ref Range    Potassium 4.2 3.4 - 5.3 mmol/L   Glucose by meter    Collection Time: 06/06/25  9:10 PM   Result Value Ref Range    GLUCOSE BY METER POCT 314 (H) 70 - 99 mg/dL   Basic metabolic panel    Collection Time: 06/07/25  5:52 AM   Result Value Ref Range    Sodium 141 135 - 145 mmol/L    Potassium 4.5 3.4 - 5.3 mmol/L    Chloride 108 (H) 98 - 107 mmol/L    Carbon Dioxide (CO2) 23 22 - 29 mmol/L    Anion Gap 10 7 - 15 mmol/L    Urea Nitrogen 38.7 (H) 8.0 - 23.0 mg/dL    Creatinine 1.62 (H) 0.51 - 0.95 mg/dL    GFR Estimate 33 (L) >60 mL/min/1.73m2    Calcium 7.8 (L) 8.8 - 10.4 mg/dL    Glucose 236 (H) 70 - 99 mg/dL   CBC with platelets    Collection Time: 06/07/25  5:52 AM   Result Value Ref Range    WBC Count 16.8 (H) 4.0 - 11.0 10e3/uL    RBC Count 2.28 (L) 3.80 - 5.20 10e6/uL    Hemoglobin 6.4 (LL) 11.7 - 15.7 g/dL    Hematocrit 20.6 (L) 35.0 - 47.0 %    MCV 90 78 - 100 fL    MCH 28.1 26.5 - 33.0 pg    MCHC 31.1 (L) 31.5 - 36.5 g/dL    RDW 17.3 (H) 10.0 - 15.0 %    Platelet Count 275 150 - 450 10e3/uL   Phosphorus    Collection Time: 06/07/25  5:52 AM   Result Value Ref Range    Phosphorus 3.3 2.5 - 4.5 mg/dL   Magnesium    Collection Time: 06/07/25  5:52 AM   Result Value Ref Range    Magnesium 2.4 (H) 1.7 - 2.3 mg/dL   Adult Type and Screen    Collection Time: 06/07/25  5:52 AM   Result Value Ref Range    ABO/RH(D) O POS     Antibody Screen Negative Negative    SPECIMEN EXPIRATION DATE 6/10/2025 11:59:00 PM CDT    Red Cell Antigen Typing Non ABO:    Collection Time: 06/07/25  5:52 AM   Result Value Ref Range    C Antigen Type Positive     Jka Antigen Type Negative     SPECIMEN EXPIRATION DATE 6/10/2025 11:59:00 PM CDT    Hemoglobin    Collection Time: 06/07/25  6:54 AM   Result Value Ref Range    Hemoglobin 7.1 (L) 11.7 - 15.7 g/dL    MCV 92 78 - 100 fL   Glucose by meter    Collection Time: 06/07/25  7:50 AM    Result Value Ref Range    GLUCOSE BY METER POCT 203 (H) 70 - 99 mg/dL   Prepare red blood cells (unit)    Collection Time: 06/07/25  8:51 AM   Result Value Ref Range    Blood Component Type Red Blood Cells     Product Code [id number]     Unit Status Transfused     Unit Number [id number]     CROSSMATCH COMPATIBLE     CODING SYSTEM [id number]     ISSUE DATE AND TIME 6/7/2025  2:57:00 PM CDT     UNIT ABO/RH O-     UNIT TYPE ISBT 9500    Glucose by meter    Collection Time: 06/07/25 11:50 AM   Result Value Ref Range    GLUCOSE BY METER POCT 197 (H) 70 - 99 mg/dL   UA with Microscopic reflex to Culture    Collection Time: 06/07/25  1:17 PM    Specimen: Urine, Clean Catch   Result Value Ref Range    Color Urine Yellow Colorless, Straw, Light Yellow, Yellow    Appearance Urine Clear Clear    Glucose Urine 50 (A) Negative mg/dL    Bilirubin Urine Negative Negative    Ketones Urine Negative Negative mg/dL    Specific Gravity Urine 1.033 1.003 - 1.035    Blood Urine Negative Negative    pH Urine 5.5 5.0 - 7.0    Protein Albumin Urine 50 (A) Negative mg/dL    Urobilinogen Urine Normal Normal mg/dL    Nitrite Urine Negative Negative    Leukocyte Esterase Urine Negative Negative    RBC Urine 2 <=2 /HPF    WBC Urine 4 <=5 /HPF    Squamous Epithelials Urine 1 <=1 /HPF    Transitional Epithelials Urine <1 <=1 /HPF   Glucose by meter    Collection Time: 06/07/25  6:05 PM   Result Value Ref Range    GLUCOSE BY METER POCT 224 (H) 70 - 99 mg/dL   Glucose by meter    Collection Time: 06/07/25  8:53 PM   Result Value Ref Range    GLUCOSE BY METER POCT 250 (H) 70 - 99 mg/dL   Glucose by meter    Collection Time: 06/08/25  2:03 AM   Result Value Ref Range    GLUCOSE BY METER POCT 224 (H) 70 - 99 mg/dL   Basic metabolic panel    Collection Time: 06/08/25  4:47 AM   Result Value Ref Range    Sodium 139 135 - 145 mmol/L    Potassium 4.4 3.4 - 5.3 mmol/L    Chloride 105 98 - 107 mmol/L    Carbon Dioxide (CO2) 25 22 - 29 mmol/L    Anion Gap 9 7  - 15 mmol/L    Urea Nitrogen 41.1 (H) 8.0 - 23.0 mg/dL    Creatinine 1.61 (H) 0.51 - 0.95 mg/dL    GFR Estimate 33 (L) >60 mL/min/1.73m2    Calcium 8.1 (L) 8.8 - 10.4 mg/dL    Glucose 224 (H) 70 - 99 mg/dL   CBC with platelets    Collection Time: 06/08/25  4:47 AM   Result Value Ref Range    WBC Count 13.7 (H) 4.0 - 11.0 10e3/uL    RBC Count 2.75 (L) 3.80 - 5.20 10e6/uL    Hemoglobin 7.7 (L) 11.7 - 15.7 g/dL    Hematocrit 24.7 (L) 35.0 - 47.0 %    MCV 90 78 - 100 fL    MCH 28.0 26.5 - 33.0 pg    MCHC 31.2 (L) 31.5 - 36.5 g/dL    RDW 17.2 (H) 10.0 - 15.0 %    Platelet Count 278 150 - 450 10e3/uL   Magnesium    Collection Time: 06/08/25  4:47 AM   Result Value Ref Range    Magnesium 2.3 1.7 - 2.3 mg/dL   Phosphorus    Collection Time: 06/08/25  4:47 AM   Result Value Ref Range    Phosphorus 3.1 2.5 - 4.5 mg/dL   CRP inflammation    Collection Time: 06/08/25  4:47 AM   Result Value Ref Range    CRP Inflammation 34.00 (H) <5.00 mg/L   Glucose by meter    Collection Time: 06/08/25  8:15 AM   Result Value Ref Range    GLUCOSE BY METER POCT 229 (H) 70 - 99 mg/dL   Glucose by meter    Collection Time: 06/08/25 12:16 PM   Result Value Ref Range    GLUCOSE BY METER POCT 193 (H) 70 - 99 mg/dL   Glucose by meter    Collection Time: 06/08/25  4:59 PM   Result Value Ref Range    GLUCOSE BY METER POCT 189 (H) 70 - 99 mg/dL   Glucose by meter    Collection Time: 06/08/25  9:55 PM   Result Value Ref Range    GLUCOSE BY METER POCT 221 (H) 70 - 99 mg/dL   Glucose by meter    Collection Time: 06/08/25 11:16 PM   Result Value Ref Range    GLUCOSE BY METER POCT 235 (H) 70 - 99 mg/dL   Glucose by meter    Collection Time: 06/09/25  2:24 AM   Result Value Ref Range    GLUCOSE BY METER POCT 191 (H) 70 - 99 mg/dL   Basic metabolic panel    Collection Time: 06/09/25  4:49 AM   Result Value Ref Range    Sodium 140 135 - 145 mmol/L    Potassium 4.5 3.4 - 5.3 mmol/L    Chloride 107 98 - 107 mmol/L    Carbon Dioxide (CO2) 24 22 - 29 mmol/L     Anion Gap 9 7 - 15 mmol/L    Urea Nitrogen 44.7 (H) 8.0 - 23.0 mg/dL    Creatinine 1.64 (H) 0.51 - 0.95 mg/dL    GFR Estimate 32 (L) >60 mL/min/1.73m2    Calcium 8.2 (L) 8.8 - 10.4 mg/dL    Glucose 200 (H) 70 - 99 mg/dL   CBC with platelets    Collection Time: 06/09/25  4:49 AM   Result Value Ref Range    WBC Count 11.4 (H) 4.0 - 11.0 10e3/uL    RBC Count 2.73 (L) 3.80 - 5.20 10e6/uL    Hemoglobin 7.8 (L) 11.7 - 15.7 g/dL    Hematocrit 24.8 (L) 35.0 - 47.0 %    MCV 91 78 - 100 fL    MCH 28.6 26.5 - 33.0 pg    MCHC 31.5 31.5 - 36.5 g/dL    RDW 17.2 (H) 10.0 - 15.0 %    Platelet Count 281 150 - 450 10e3/uL   Tacrolimus by Tandem Mass Spectrometry    Collection Time: 06/09/25  4:49 AM   Result Value Ref Range    Tacrolimus by Tandem Mass Spectrometry 2.0 (L) 5.0 - 15.0 ug/L    Tacrolimus Last Dose Date      Tacrolimus Last Dose Time     Magnesium    Collection Time: 06/09/25  4:49 AM   Result Value Ref Range    Magnesium 2.3 1.7 - 2.3 mg/dL   Phosphorus    Collection Time: 06/09/25  4:49 AM   Result Value Ref Range    Phosphorus 2.9 2.5 - 4.5 mg/dL   Glucose by meter    Collection Time: 06/09/25  7:48 AM   Result Value Ref Range    GLUCOSE BY METER POCT 238 (H) 70 - 99 mg/dL   CBC with platelets and differential    Collection Time: 06/09/25  8:38 AM   Result Value Ref Range    WBC Count 11.2 (H) 4.0 - 11.0 10e3/uL    RBC Count 2.89 (L) 3.80 - 5.20 10e6/uL    Hemoglobin 8.3 (L) 11.7 - 15.7 g/dL    Hematocrit 26.4 (L) 35.0 - 47.0 %    MCV 91 78 - 100 fL    MCH 28.7 26.5 - 33.0 pg    MCHC 31.4 (L) 31.5 - 36.5 g/dL    RDW 17.1 (H) 10.0 - 15.0 %    Platelet Count 281 150 - 450 10e3/uL    % Neutrophils 88 %    % Lymphocytes 5 %    % Monocytes 4 %    % Eosinophils 0 %    % Basophils 0 %    % Immature Granulocytes 3 %    NRBCs per 100 WBC 1 (H) <1 /100    Absolute Neutrophils 9.8 (H) 1.6 - 8.3 10e3/uL    Absolute Lymphocytes 0.6 (L) 0.8 - 5.3 10e3/uL    Absolute Monocytes 0.5 0.0 - 1.3 10e3/uL    Absolute Eosinophils 0.0 0.0 -  0.7 10e3/uL    Absolute Basophils 0.0 0.0 - 0.2 10e3/uL    Absolute Immature Granulocytes 0.3 <=0.4 10e3/uL    Absolute NRBCs 0.1 10e3/uL   Glucose by meter    Collection Time: 06/09/25 12:55 PM   Result Value Ref Range    GLUCOSE BY METER POCT 186 (H) 70 - 99 mg/dL   Glucose by meter    Collection Time: 06/09/25  5:54 PM   Result Value Ref Range    GLUCOSE BY METER POCT 236 (H) 70 - 99 mg/dL   Glucose by meter    Collection Time: 06/09/25  9:36 PM   Result Value Ref Range    GLUCOSE BY METER POCT 143 (H) 70 - 99 mg/dL   Glucose by meter    Collection Time: 06/10/25  2:48 AM   Result Value Ref Range    GLUCOSE BY METER POCT 229 (H) 70 - 99 mg/dL   Basic metabolic panel    Collection Time: 06/10/25  5:09 AM   Result Value Ref Range    Sodium 139 135 - 145 mmol/L    Potassium 4.5 3.4 - 5.3 mmol/L    Chloride 106 98 - 107 mmol/L    Carbon Dioxide (CO2) 21 (L) 22 - 29 mmol/L    Anion Gap 12 7 - 15 mmol/L    Urea Nitrogen 49.4 (H) 8.0 - 23.0 mg/dL    Creatinine 1.62 (H) 0.51 - 0.95 mg/dL    GFR Estimate 33 (L) >60 mL/min/1.73m2    Calcium 8.5 (L) 8.8 - 10.4 mg/dL    Glucose 227 (H) 70 - 99 mg/dL   CBC with platelets    Collection Time: 06/10/25  5:09 AM   Result Value Ref Range    WBC Count 10.6 4.0 - 11.0 10e3/uL    RBC Count 2.65 (L) 3.80 - 5.20 10e6/uL    Hemoglobin 7.4 (L) 11.7 - 15.7 g/dL    Hematocrit 23.7 (L) 35.0 - 47.0 %    MCV 89 78 - 100 fL    MCH 27.9 26.5 - 33.0 pg    MCHC 31.2 (L) 31.5 - 36.5 g/dL    RDW 17.1 (H) 10.0 - 15.0 %    Platelet Count 270 150 - 450 10e3/uL   Magnesium    Collection Time: 06/10/25  5:09 AM   Result Value Ref Range    Magnesium 2.1 1.7 - 2.3 mg/dL   Phosphorus    Collection Time: 06/10/25  5:09 AM   Result Value Ref Range    Phosphorus 2.4 (L) 2.5 - 4.5 mg/dL   Glucose by meter    Collection Time: 06/10/25  9:18 AM   Result Value Ref Range    GLUCOSE BY METER POCT 206 (H) 70 - 99 mg/dL   Glucose by meter    Collection Time: 06/10/25  2:00 PM   Result Value Ref Range    GLUCOSE BY  METER POCT 112 (H) 70 - 99 mg/dL   Glucose by meter    Collection Time: 06/10/25  5:02 PM   Result Value Ref Range    GLUCOSE BY METER POCT 120 (H) 70 - 99 mg/dL   Glucose by meter    Collection Time: 06/10/25  9:36 PM   Result Value Ref Range    GLUCOSE BY METER POCT 142 (H) 70 - 99 mg/dL   Glucose by meter    Collection Time: 06/11/25  1:55 AM   Result Value Ref Range    GLUCOSE BY METER POCT 176 (H) 70 - 99 mg/dL   Basic metabolic panel    Collection Time: 06/11/25  4:45 AM   Result Value Ref Range    Sodium 141 135 - 145 mmol/L    Potassium 4.4 3.4 - 5.3 mmol/L    Chloride 108 (H) 98 - 107 mmol/L    Carbon Dioxide (CO2) 21 (L) 22 - 29 mmol/L    Anion Gap 12 7 - 15 mmol/L    Urea Nitrogen 46.7 (H) 8.0 - 23.0 mg/dL    Creatinine 1.55 (H) 0.51 - 0.95 mg/dL    GFR Estimate 34 (L) >60 mL/min/1.73m2    Calcium 8.4 (L) 8.8 - 10.4 mg/dL    Glucose 182 (H) 70 - 99 mg/dL   CBC with platelets    Collection Time: 06/11/25  4:45 AM   Result Value Ref Range    WBC Count 10.2 4.0 - 11.0 10e3/uL    RBC Count 2.65 (L) 3.80 - 5.20 10e6/uL    Hemoglobin 7.5 (L) 11.7 - 15.7 g/dL    Hematocrit 24.2 (L) 35.0 - 47.0 %    MCV 91 78 - 100 fL    MCH 28.3 26.5 - 33.0 pg    MCHC 31.0 (L) 31.5 - 36.5 g/dL    RDW 17.0 (H) 10.0 - 15.0 %    Platelet Count 254 150 - 450 10e3/uL   Magnesium    Collection Time: 06/11/25  4:45 AM   Result Value Ref Range    Magnesium 2.0 1.7 - 2.3 mg/dL   Phosphorus    Collection Time: 06/11/25  4:45 AM   Result Value Ref Range    Phosphorus 3.1 2.5 - 4.5 mg/dL   Glucose by meter    Collection Time: 06/11/25  7:23 AM   Result Value Ref Range    GLUCOSE BY METER POCT 150 (H) 70 - 99 mg/dL   Glucose by meter    Collection Time: 06/11/25 12:05 PM   Result Value Ref Range    GLUCOSE BY METER POCT 149 (H) 70 - 99 mg/dL   Glucose by meter    Collection Time: 06/11/25  1:37 PM   Result Value Ref Range    GLUCOSE BY METER POCT 183 (H) 70 - 99 mg/dL   Glucose by meter    Collection Time: 06/11/25  6:41 PM   Result Value  Ref Range    GLUCOSE BY METER POCT 178 (H) 70 - 99 mg/dL   Glucose by meter    Collection Time: 06/11/25  9:08 PM   Result Value Ref Range    GLUCOSE BY METER POCT 196 (H) 70 - 99 mg/dL   Basic metabolic panel    Collection Time: 06/12/25  5:50 AM   Result Value Ref Range    Sodium 141 135 - 145 mmol/L    Potassium 4.2 3.4 - 5.3 mmol/L    Chloride 107 98 - 107 mmol/L    Carbon Dioxide (CO2) 25 22 - 29 mmol/L    Anion Gap 9 7 - 15 mmol/L    Urea Nitrogen 45.1 (H) 8.0 - 23.0 mg/dL    Creatinine 1.62 (H) 0.51 - 0.95 mg/dL    GFR Estimate 33 (L) >60 mL/min/1.73m2    Calcium 8.4 (L) 8.8 - 10.4 mg/dL    Glucose 107 (H) 70 - 99 mg/dL   CBC with platelets    Collection Time: 06/12/25  5:50 AM   Result Value Ref Range    WBC Count 8.4 4.0 - 11.0 10e3/uL    RBC Count 2.68 (L) 3.80 - 5.20 10e6/uL    Hemoglobin 7.6 (L) 11.7 - 15.7 g/dL    Hematocrit 24.3 (L) 35.0 - 47.0 %    MCV 91 78 - 100 fL    MCH 28.4 26.5 - 33.0 pg    MCHC 31.3 (L) 31.5 - 36.5 g/dL    RDW 17.2 (H) 10.0 - 15.0 %    Platelet Count 230 150 - 450 10e3/uL   Phosphorus    Collection Time: 06/12/25  5:50 AM   Result Value Ref Range    Phosphorus 3.0 2.5 - 4.5 mg/dL   Tacrolimus by Tandem Mass Spectrometry    Collection Time: 06/12/25  5:50 AM   Result Value Ref Range    Tacrolimus by Tandem Mass Spectrometry 2.2 (L) 5.0 - 15.0 ug/L    Tacrolimus Last Dose Date      Tacrolimus Last Dose Time     Magnesium    Collection Time: 06/12/25  5:50 AM   Result Value Ref Range    Magnesium 1.9 1.7 - 2.3 mg/dL   Glucose by meter    Collection Time: 06/12/25  7:34 AM   Result Value Ref Range    GLUCOSE BY METER POCT 95 70 - 99 mg/dL       ASSESSMENT/PLAN:  Diverticulitis complicated by abscess status post open sigmoidectomy with end colostomy 5/30/25  History of Sepsis due to Klebsiella pneumoniae and Pseudomonas aeruginosa bacteremia 5/28, likely of intra-abdominal origin   Hypotension due to sepsis  Comment: Long multiple hospitalization course. She required a slow steroid  "taper because of blood pressure dysregulations. We held her home antihypertensives while titrating steroids. We transitioned from meropenem to ceftolozane-tazobactam due to resistance (psuedomonas coverage) and switched vancomycin to linezolid in the setting of acute kidney injury for coverage of previous vancomycin-resistent enterococci (E Facecium). From infectious disease's perspective, there were no good oral antibiotic options and so she completed a 7-day IV antibiotic course at the hospital. There was new cloudy PATY drain output on 6/6 and ongoing leukocytosis but no new localizing symptoms so we continued to monitor and found. She had been seen in colorectal surgery clinic the day prior (5/27) where it was decided that she should undergo open sigmoidectomy with DLI. However, given her acute onset symptoms, we think she may need surgery sooner than anticipated. She is currently hemodynamically stable with a benign abdominal exam which doesn't warrant any sort of emergent intervention. Additionally, she has a complex past medical history so further optimization is required.   Plan:  -Follow up with ID as directed. Scheduled for 7/2/25  -Follow up with Colorectal Surgery as directed. Scheduled for 6/18 and 7/15/25  -High dose Vitamin A x 30 days post-op (through 6/30)  -CMP and CBC due 6/16/25  -Continue colostomy care as directed:  *LLQ Colostomy pouching plan:   Pouching system: ostomy supplies pouches: Marina 57 FECAL ([id number]) ostomy supplies barrier: Derby 57mm SOFT CONVEX ([id number])  Accessories used: WOC ostomy accessories: 2\" Cera Barrier Ring ([id number]) and Cavilon no sting barrier film ([id number])   Frequency of pouch changes: Twice weekly and PRN leakage  WOC follow up plan: Daily Monday-Friday (as able)  Bedside RN interventions: Change pouch PRN if leaking using the supplies above, Empty pouch when 1/3 to 1/2 full, ensure to clean pouch outlet after emptying to prevent odor, Notify WOC for ongoing " pouch leakage, Document stoma appearance and output volume, color, and consistency every shift, Encourage patient to empty pouch with assist, and Assist patient to measure and record output      Liver transplant in 2002 2/2 primary biliary cirrhosis  EBV +  Reactive Leukocytosis   Sjogren's syndrome  Comment: Chronic. Follows Dr. Ramirez. Transplant Hepatology was consulted and advised against sirolimus. PTA prednisone 9mg was resumed after weaning off IV hydrocortisone   Plan:  -Continue Tacrolimus Oral Capsule 2mg BID.  -Continue prednisone 9mg daily for now. (5mg with 4 tablets of 1mg=9mg total)  -Continue PTA ursodiol 250 mg BID  -Follow up with transplant team as directed. Scheduled for 8/22/25  -Follow up with nephrology as directed. Scheduled for 7/23/25  -CMP and CBC due 6/16/25     Depression   Protein calorie deficit malnutrition    Comment: Nutrition was consulted and suggested supplements of Gelatin Plus three times per day with meals and Thera-Vit 1 tablet daily   Plan:  -Monitor mood and behaviors  -Dietician to remain involved to assist with supplemental needs  -Monitor for worsening s/symptoms of concerns  -Monitor for changes in mobility, eating and sleeping patterns  -Continue zoloft 50mg daily.   -ACP while on TCU  -CMP and CBC due 6/16/25     Mild Obstructive Sleep Apnea   Nocturnal Hypoxemia   Pleural effusions  SOB  Comment: Chronic. Sleep study in 2018 showing mild RASHIDA with recommendation to start CPAP. It does not appear that there was further follow up and not using CPAP. See results of previous CT scan as indicated above.   Plan:  -Monitor sleeping patterns  -Continue albuterol inhaler every 4 hours PRN. May HUNTER  -Monitor respiratory status  -PCP to consider sleep medicine referral  -CMP and CBC due 6/16/25     CKD3b w/ moderate proteinuria, at baseline  HTN  CVD/HLD  paroxysmal atrial fibrillation  Comment: Chronic. Baseline Cr. 1.5. History of dialysis at time of liver transplant 23 years  ago. Recurrent AKIs and immunosuppressive medication toxicity. Not on SGLT2i 2/2 recurrent UTI. Chart review w/ unclear heart failure history, unconfirmed with EF 60-65% on 3/10/25. BNP 1446 elevated from 1 month ago. Given unclear HF history, trace bilateral LE edema, with some pulm edema noted on imaging, consideration for volume overload. However, without orthopnea or clear cough.   Plan:  -Continue WITHOUT antihypertensives at this time (stopped Amlodipine, hydralazine, metoprolol were stopped in hospital)  -Monitor BP and HR  -Continue PTA evolocumab q2week.   -Continue PTA eztimibe 10mg every day  -Continue apixaban 2.5mg BID  -Follow up with cardiology as directed. Scheduled for 6/17/25  -CMP and CBC due 6/16/25     Concern for transient ischemic attack  Comment: Acute. In the morning of 6/4, she stated that she had speech problems in the last couple of days since the surgery. She described that she has trouble getting her words out and that her speech did not feel as fluent as it was previously.  She denied any new numbness, tingling, headache or changes in vision. CT head was negative for intracranial bleed or other acute process and TTE with bubble study was also negative. It is possible that she may have had a TIA following surgery as anticoagulation had been held. MRI brain was negative for acute ischemic changes and her speech has returned to baseline.   Plan:  -Continue PTA Xarelto   -No need for neuro follow-up   -CMP and CBC due 6/16/25    T2DM  (A1c 6.8% 4/14/25)  Comment: Chronic. Last A1c 6.8% on 4/14/25.   Plan:  -Continue PTA linagliptin 5mg every day  -Continue PTA gabapentin 300mg at bedtime  -Blood Glucose monitoring back to previous schedule of BID. HOLD off on using freestyle phil 2 sensor while on TCU  -CMP and CBC due 6/16/25  -Obtain hgb A1c as directed. Due July 2025    Acute kidney injury on CKD3, resolved   Bilateral stent placement per urology 5/29  Acute tubular necrosis  Comment:  Her baseline creatinine is 1.3-1.6. She had an DERREK in the setting of hypotension, shock, and nephrotoxic meds which resolved after receiving IV fluids and improved PO after surgery.   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 Wound does not look infected. She now report having skin lesion to left anterior forearm. 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 Per recent hospitalization-Did not find any signs of overt bleeding and her ostomy output and vitals were stable. Continued her ferous sulfate and folic acid inpatient. She received 1 unit of packed red blood cells on 6/7. Stable at the time of discharge.   Plan:  -Monitor bleeding risks  -Continue PTA ferrous sulfate daily  -Continue folic acid daily  -CMP and CBC due 6/16/25     Hypothyroidism  Papillary thyroid carcinoma  Chronic. Recent TSH~1.72 on 4/4/25. S/p thyroidectomy 2010  Plan;  -Continue PTA levothyroxine 175mcg qD   -Monitor for worsening s/symptoms of concerns    TSH   Date Value Ref Range Status   04/04/2025 1.72 0.30 - 4.20 uIU/mL Final   05/20/2019 12.11 (H) 0.40 - 4.00 mU/L Final     Chronic Shoulder Pain  Osteoarthritis   Lower back pain  Comment: Chronic. stable  Plan:  -Monitor pain complaints  -Continue diclofenac gel application to painful areas QID  -Continue Tylenol 1000mg BID   -Continue gabapentin 300mg at HS--only able to tolerate liquid given gelatin capsule allergy  -CMP and CBC due 6/16/25     Constipation   Nausea  GERD  Comment:  Chronic. S/T polypharmacy.   Plan:  -Monitor BM patterns  -Continue TUMS 1000mg every 4 hours PRN  -Continue beneFiber qd    -Zofran PRN     Epistaxis  Comment: Acute on chronic. She reports occasional nose bleed at times. No episodes while on TCU.   Plan:  -Monitor bleeding risks  -Monitor for worsening s/symptoms of concerns  -Afrin BID PRN on TCU if warranted     Vaginal bleeding  Comment: Acute on chronic. Referred to OBGYN. Last menstrual period she reports has been 40 years ago or so. Known family history of endometrial cancer that metastasized to bowel per her reports. She reports her mother had extensive genetic testing for review in EPIC. Reports mother name: Anne Yap  3/9/23 for reference if indicated. Per OBGYN on 25:  If the endometrial stripe is greater than 4 mm or if it is less than 4 mm and she continues to have bleeding, she will need tissue evaluation with either office endometrial biopsy or hysteroscopy with D&C in the operating room.  She does not believe she can tolerate an endometrial biopsy in the clinic, so we discussed doing this as an outpatient procedure.  Plan:  -Monitor for worsening s/symptoms of concerns  -Follow up with OBGYN as directed  -CMP and CBC due 25     Physical deconditioning  Generalized muscle weakness  History of falls  Comment: Acute on chronic. Known poor history PTA. [facility] known to [facility] given concerns.   Plan:  -Continue Physical therapy and Occupational therapy  -Recommend cognitive testing on site  -Highly recommend MIKEY compliance post TCU     60 minutes spent on the date of the encounter doing chart review, history and exam, PMH, documentation and further activities as noted above. Collaboration with nursing staff on site, clinical managers, and therapies were completed on site today.      Electronically signed by:  Dr. Manda Flor DNP, APRN, FNP-C, WCS-C, EDS-C     Provider reviewed records from facility, and interpreted  most recent imaging/lab work, and vital signs.   Acute and chronic conditions managed by writer. Have been reviewed during today's exam

## 2025-06-12 NOTE — PROGRESS NOTES
Care Management Discharge Note    Discharge Date: 06/12/2025       Discharge Disposition: Transitional Care    Discharge Services: Transportation Services    Discharge DME: None    Discharge Transportation: [employer] - Wheelchair [phone]    Private pay costs discussed: transportation costs    Does the patient's insurance plan have a 3 day qualifying hospital stay waiver?  No    PAS Confirmation Code: [id number]  Patient/family educated on Medicare website which has current facility and service quality ratings: yes    Education Provided on the Discharge Plan:  yes  Persons Notified of Discharge Plans: Patient, provider, nursing staff, TCU,   Patient/Family in Agreement with the Plan: yes    Handoff Referral Completed: Yes, [facility] PCP: Internal handoff referral completed    Additional Information:  Patient is scheduled to discharge to [facility] today. Wheelchair transportation scheduled to pickup between [id number]    [facility] nurse gathered at least 7 days worth of ostomy supplies that will be sent with the patient.    NADEEM reported discharge plan to Dr. Christensen with primary medicine team and bedside nurse.     @0945 NADEEM sent discharge orders to the TCU facility via [employer] in basket    [facility] - TCU  [address] [place] [address]  P: [phone]  F: [phone]  - Will need to be sent with at least 7 days of ostomy supplies    NA[address] [place]  [address]   Office: [phone]  [email or web address]  Securely message with [employer] (Search Name or 7C Med Surg [phone] )  Text page via [facility] Paging/Directory   See signed in provider for up to date coverage information  Social Work & Care Management Department

## 2025-06-12 NOTE — PLAN OF CARE
Occupational Therapy Discharge Summary    Reason for therapy discharge:    Discharged to transitional care facility.    Progress towards therapy goal(s). See goals on Care Plan in [facility] electronic health record for goal details.  Goals not met.  Barriers to achieving goals:   discharge from facility.    Therapy recommendation(s):    Continued therapy is recommended.  Rationale/Recommendations:  maximize safety and independence with ADLs.

## 2025-06-12 NOTE — PLAN OF CARE
Physical Therapy Discharge Summary    Reason for therapy discharge:    Discharged to transitional care facility.    Progress towards therapy goal(s). See goals on Care Plan in [facility] electronic health record for goal details.  Goals partially met.  Barriers to achieving goals:   discharge from facility.    Therapy recommendation(s):    Continued therapy is recommended.  Rationale/Recommendations:  for continued strength and mobility training.

## 2025-06-12 NOTE — PROGRESS NOTES
Patient continues to refuse best practice care after education provided to patient    Care refused: repo q2h  Education Provided: Yes  Provider notified (name): No

## 2025-06-12 NOTE — PROGRESS NOTES
Brief Hepatology note:    Transplant: Liver  Date of transplant: 5/22/02  Reason for tx: PBC  Post op course c/b: diverticulitis s/p open sigmoidectomy, end colostomy (5/29/25), E. Facium BC (6/2)     Current immunosuppression: tacrolimus 2 mg BID, prednisone 9 mg daily     Lab Results   Component Value Date    AST 21 06/04/2025    AST 55 09/18/2020     Lab Results   Component Value Date    ALT 20 06/04/2025    ALT 78 09/18/2020     Lab Results   Component Value Date    BILICONJ 0.0 07/24/2014      Lab Results   Component Value Date    BILITOTAL 0.4 06/06/2025    BILITOTAL 0.3 09/18/2020     Lab Results   Component Value Date    ALBUMIN 2.7 06/04/2025    ALBUMIN 3.1 06/24/2022    ALBUMIN 3.5 09/18/2020     Lab Results   Component Value Date    PROTTOTAL 5.5 06/04/2025    PROTTOTAL 6.4 09/18/2020      Lab Results   Component Value Date    ALKPHOS 137 06/04/2025    ALKPHOS 152 09/18/2020       Creatinine   Date Value Ref Range Status   06/12/2025 1.62 (H) 0.51 - 0.95 mg/dL Final   09/18/2020 1.3 mg/dL Final   ]    WBC   Date Value Ref Range Status   09/18/2020 7.1 10^9/L Final     WBC Count   Date Value Ref Range Status   06/12/2025 8.4 4.0 - 11.0 10e3/uL Final   ]    Immunosuppressant Medications       Immunosuppressive Agents Disp Start End     PROGRAF (BRAND) 1 MG/ML suspension -- 6/12/2025 --    Sig - Route: Take 2 mLs (2 mg) by mouth 2 times daily. - Oral    Class: Transitional    Renewals       Renewal requests to authorizing provider (Negrito Christensen MD) <b>prohibited</b>             sirolimus (GENERIC EQUIVALENT) 1 MG tablet ([Paused] since 6/12/2025  9:24 AM) 90 tablet 11/27/2024 --    Sig - Route: Take 1 tablet (1 mg) by mouth daily. - Oral    Class: E-Prescribe    Notes to Pharmacy: TXP DT 5/22/2002 (Liver) TXP Dischg DT 6/3/2002 DX Liver replaced by transplant Z94.4 TX [facility] ([place], [place])     tacrolimus (GENERIC EQUIVALENT) 0.5 MG capsule ([Paused] since  6/12/2025  9:24 AM) 60 capsule 5/27/2025 --    Sig - Route: Take 1 capsule (0.5 mg) by mouth every 12 hours. - Oral    Class: E-Prescribe    Notes to Pharmacy: TXP DT 5/22/2002 (Liver) TXP Dischg DT 6/3/2002 DX Liver replaced by transplant Z94.4 TX Center [facility] [place] ([place], [place])    Prior authorization: Closed     tacrolimus (PROGRAF BRAND) suspension 2 mg -- 6/6/2025 --     2 mg, Oral, 2 TIMES DAILY., Shake well. Check if DRUG LEVEL is needed BEFORE administering dose. Not recommended to administer via jejunal route, but jejunal route may be used if that is only route available.  As this medication is not commercially available as a liquid,  [place] manufactures this product using tacrolimus (PROGRAF BRAND) capsules.    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 Due to urgent surgery, this was transitioned to tacrolimus suspension. (Allergy to capsule protein). Trough level today 2.2, below goal of ~3, however, with her CKD and long time from transplant, will keep her tacrolimus at the same dose. She will also be receiving prednisone 9 mg daily also.     LIZ Martínez, CNP  Inpatient hepatology [facility]  [phone]

## 2025-06-12 NOTE — PLAN OF CARE
Discharge to: TCU  Transportation: wheelchair ride   Time: 1300  Prescriptions: sent to TCU  Belongings: all belongings accounted for and sent w/ patient    -if applicable return home meds from inpatient pharmacy: n/a  Access: n/a  Care plan and education discontinued: yes  Paperwork: AVS/packet printed and sent w/ patient. At least one week of ostomy supplies sent w/ patient attempted to call TCU x4 for RN to RN report w/ no answer.

## 2025-06-13 ENCOUNTER — LAB REQUISITION (OUTPATIENT)
Dept: LAB | Facility: CLINIC | Age: 75
End: 2025-06-13
Payer: MEDICARE

## 2025-06-13 ENCOUNTER — TELEPHONE (OUTPATIENT)
Dept: TRANSPLANT | Facility: CLINIC | Age: 75
End: 2025-06-13

## 2025-06-13 ENCOUNTER — TRANSITIONAL CARE UNIT VISIT (OUTPATIENT)
Dept: GERIATRICS | Facility: CLINIC | Age: 75
End: 2025-06-13
Payer: MEDICARE

## 2025-06-13 DIAGNOSIS — Z85.828 S/P MOHS SURGERY FOR BASAL CELL CARCINOMA: ICD-10-CM

## 2025-06-13 DIAGNOSIS — G47.34 NOCTURNAL HYPOXEMIA: ICD-10-CM

## 2025-06-13 DIAGNOSIS — F32.A DEPRESSION, UNSPECIFIED DEPRESSION TYPE: ICD-10-CM

## 2025-06-13 DIAGNOSIS — R11.0 NAUSEA: ICD-10-CM

## 2025-06-13 DIAGNOSIS — Z98.890 S/P MOHS SURGERY FOR BASAL CELL CARCINOMA: ICD-10-CM

## 2025-06-13 DIAGNOSIS — M81.0 AGE-RELATED OSTEOPOROSIS WITHOUT CURRENT PATHOLOGICAL FRACTURE: ICD-10-CM

## 2025-06-13 DIAGNOSIS — Z11.1 ENCOUNTER FOR SCREENING FOR RESPIRATORY TUBERCULOSIS: ICD-10-CM

## 2025-06-13 DIAGNOSIS — M54.50 BILATERAL LOW BACK PAIN WITHOUT SCIATICA, UNSPECIFIED CHRONICITY: ICD-10-CM

## 2025-06-13 DIAGNOSIS — D64.9 ANEMIA, UNSPECIFIED: ICD-10-CM

## 2025-06-13 DIAGNOSIS — D72.829 LEUKOCYTOSIS, UNSPECIFIED TYPE: ICD-10-CM

## 2025-06-13 DIAGNOSIS — I48.0 PAF (PAROXYSMAL ATRIAL FIBRILLATION) (H): ICD-10-CM

## 2025-06-13 DIAGNOSIS — K65.0: ICD-10-CM

## 2025-06-13 DIAGNOSIS — K21.00 GASTROESOPHAGEAL REFLUX DISEASE WITH ESOPHAGITIS, UNSPECIFIED WHETHER HEMORRHAGE: ICD-10-CM

## 2025-06-13 DIAGNOSIS — G45.9 TIA (TRANSIENT ISCHEMIC ATTACK): ICD-10-CM

## 2025-06-13 DIAGNOSIS — L21.9 SEBORRHEIC DERMATITIS: ICD-10-CM

## 2025-06-13 DIAGNOSIS — K65.1 PERITONEAL ABSCESS (H): ICD-10-CM

## 2025-06-13 DIAGNOSIS — Z93.3 COLOSTOMY PRESENT (H): ICD-10-CM

## 2025-06-13 DIAGNOSIS — M35.00 SJOGREN'S SYNDROME, WITH UNSPECIFIED ORGAN INVOLVEMENT: ICD-10-CM

## 2025-06-13 DIAGNOSIS — Z86.19 HX OF SEPSIS: ICD-10-CM

## 2025-06-13 DIAGNOSIS — N18.32 TYPE 2 DIABETES MELLITUS WITH STAGE 3B CHRONIC KIDNEY DISEASE, WITHOUT LONG-TERM CURRENT USE OF INSULIN (H): ICD-10-CM

## 2025-06-13 DIAGNOSIS — Z98.890 HISTORY OF STENT INSERTION OF RENAL ARTERY: ICD-10-CM

## 2025-06-13 DIAGNOSIS — Z90.49 S/P LAPAROSCOPIC-ASSISTED SIGMOIDECTOMY: ICD-10-CM

## 2025-06-13 DIAGNOSIS — D64.9 CHRONIC ANEMIA: ICD-10-CM

## 2025-06-13 DIAGNOSIS — E46 PROTEIN MALNUTRITION: ICD-10-CM

## 2025-06-13 DIAGNOSIS — N18.30 CHRONIC KIDNEY DISEASE, STAGE 3 UNSPECIFIED (H): ICD-10-CM

## 2025-06-13 DIAGNOSIS — Z94.4 STATUS POST LIVER TRANSPLANTATION (H): ICD-10-CM

## 2025-06-13 DIAGNOSIS — R53.81 PHYSICAL DECONDITIONING: ICD-10-CM

## 2025-06-13 DIAGNOSIS — D62 ANEMIA DUE TO BLOOD LOSS, ACUTE: ICD-10-CM

## 2025-06-13 DIAGNOSIS — E11.22 TYPE 2 DIABETES MELLITUS WITH STAGE 3B CHRONIC KIDNEY DISEASE, WITHOUT LONG-TERM CURRENT USE OF INSULIN (H): ICD-10-CM

## 2025-06-13 DIAGNOSIS — J90 PLEURAL EFFUSION: ICD-10-CM

## 2025-06-13 DIAGNOSIS — N18.32 STAGE 3B CHRONIC KIDNEY DISEASE (H): ICD-10-CM

## 2025-06-13 DIAGNOSIS — K59.01 SLOW TRANSIT CONSTIPATION: ICD-10-CM

## 2025-06-13 DIAGNOSIS — N93.9 VAGINAL BLEEDING: ICD-10-CM

## 2025-06-13 DIAGNOSIS — E78.5 HYPERLIPIDEMIA LDL GOAL <70: ICD-10-CM

## 2025-06-13 DIAGNOSIS — M25.519 CHRONIC SHOULDER PAIN, UNSPECIFIED LATERALITY: ICD-10-CM

## 2025-06-13 DIAGNOSIS — I50.32 CHRONIC DIASTOLIC HEART FAILURE (H): ICD-10-CM

## 2025-06-13 DIAGNOSIS — N17.9 AKI (ACUTE KIDNEY INJURY): ICD-10-CM

## 2025-06-13 DIAGNOSIS — R06.02 SOB (SHORTNESS OF BREATH): ICD-10-CM

## 2025-06-13 DIAGNOSIS — C44.329 SQUAMOUS CELL CANCER OF SKIN OF LEFT CHEEK: ICD-10-CM

## 2025-06-13 DIAGNOSIS — Z91.81 PERSONAL HISTORY OF FALL: ICD-10-CM

## 2025-06-13 DIAGNOSIS — K57.32 DIVERTICULITIS OF COLON: Primary | ICD-10-CM

## 2025-06-13 DIAGNOSIS — I10 BENIGN ESSENTIAL HYPERTENSION: ICD-10-CM

## 2025-06-13 DIAGNOSIS — B99.9 INTRA-ABDOMINAL INFECTION: ICD-10-CM

## 2025-06-13 DIAGNOSIS — C73 PAPILLARY THYROID CARCINOMA (H): ICD-10-CM

## 2025-06-13 DIAGNOSIS — D84.9 IMMUNOCOMPROMISED: ICD-10-CM

## 2025-06-13 DIAGNOSIS — G89.29 CHRONIC SHOULDER PAIN, UNSPECIFIED LATERALITY: ICD-10-CM

## 2025-06-13 DIAGNOSIS — D68.9 COAGULOPATHY: ICD-10-CM

## 2025-06-13 DIAGNOSIS — I15.2 HYPERTENSION SECONDARY TO ENDOCRINE DISORDERS: ICD-10-CM

## 2025-06-13 DIAGNOSIS — M62.81 GENERALIZED MUSCLE WEAKNESS: ICD-10-CM

## 2025-06-13 DIAGNOSIS — R04.0 EPISTAXIS: ICD-10-CM

## 2025-06-13 DIAGNOSIS — D69.6 THROMBOCYTOPENIA: ICD-10-CM

## 2025-06-13 DIAGNOSIS — I10 ESSENTIAL (PRIMARY) HYPERTENSION: ICD-10-CM

## 2025-06-13 DIAGNOSIS — G47.33 OSA (OBSTRUCTIVE SLEEP APNEA): ICD-10-CM

## 2025-06-13 DIAGNOSIS — I10 HYPERTENSION GOAL BP (BLOOD PRESSURE) < 140/80: ICD-10-CM

## 2025-06-13 DIAGNOSIS — N18.4 CHRONIC KIDNEY DISEASE, STAGE 4 (SEVERE) (H): ICD-10-CM

## 2025-06-13 DIAGNOSIS — N17.0 ACUTE TUBULAR NECROSIS: ICD-10-CM

## 2025-06-13 DIAGNOSIS — I48.0 PAROXYSMAL ATRIAL FIBRILLATION (H): ICD-10-CM

## 2025-06-13 DIAGNOSIS — E03.9 HYPOTHYROIDISM, UNSPECIFIED TYPE: ICD-10-CM

## 2025-06-13 DIAGNOSIS — I95.9 HYPOTENSION, UNSPECIFIED HYPOTENSION TYPE: ICD-10-CM

## 2025-06-13 DIAGNOSIS — N39.0 URINARY TRACT INFECTION: ICD-10-CM

## 2025-06-13 NOTE — TELEPHONE ENCOUNTER
PA Initiation    Medication: TACROLIMUS (GENERIC) 1 MG/ML ORAL SUSP CNR  Insurance Company: [employer] Part D - Phone [phone] Fax [phone]  Pharmacy Filling the Rx: [facility] - [place], [place] - [address]  Filling Pharmacy Phone:    Filling Pharmacy Fax:    Start Date: 6/13/2025        TRANSPLANT:  Liver  DATE: 5/22/2002    MEDICARE ID# [id number]      MEDICARE PART A EFFECTIVE DATES: 12/1/2004-8/31/2011 termed 4/1/2014  MEDICARE PART B EFFECTIVE DATES:  12/1/2004-8/31/2011 termed 4/1/2014      Patient did not have medicare at time of transplant medication should be billed to part D (PA needed)

## 2025-06-13 NOTE — PROGRESS NOTES
[facility] GER[facility]    Chief Complaint   Patient presents with    RECHECK     HPI:  Luz Thompson is a 75 year old  (1950), who is being seen today for an episodic care visit at: [facility] ([facility]) [[id number]]. Today's concern is: The primary encounter diagnosis was Diverticulitis of colon. Diagnoses of Intra-abdominal infection, Hx of sepsis, Hypotension, unspecified hypotension type, S/P laparoscopic-assisted sigmoidectomy, Colostomy present (H), Physical deconditioning, Generalized muscle weakness, Chronic shoulder pain, unspecified laterality, Age-related osteoporosis without current pathological fracture, Bilateral low back pain without sciatica, unspecified chronicity, Chronic anemia, Stage 3b chronic kidney disease (H), 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, Hypothyroidism, unspecified type, S/P Mohs surgery for basal cell carcinoma, Hypertension goal BP (blood pressure) < 140/80, Nocturnal hypoxemia, Hyperlipidemia LDL goal <70, Chronic diastolic heart failure (H), RASHIDA (obstructive sleep apnea), Status post liver transplantation (H), Type 2 diabetes mellitus with stage 3b chronic kidney disease, without long-term current use of insulin (H), PAF (paroxysmal atrial fibrillation) (H), Depression, unspecified depression type, Leukocytosis, unspecified type, Sjogren's syndrome, with unspecified organ involvement, Protein malnutrition, Immunocompromised, Seborrheic dermatitis, Papillary thyroid carcinoma (H), Vaginal bleeding, Colostomy care (H), Epistaxis, Personal history of fall, Gastroesophageal reflux disease with esophagitis, unspecified whether hemorrhage, Nausea, Slow transit constipation, DERREK (acute kidney injury), History of stent insertion of renal artery, and History of TIA (transient ischemic attack) and stroke were also pertinent to this visit.    Met with patient who was found lying in bed eating breakfast. She denies any chest pain,  "palpitations, shortness of breath, [place], lightheadedness, dizziness, or cough. Denies any abdominal discomfort. Denies N&V. Denies dysuria or frequency. No further vaginal bleeding reported. She reports colostomy pouch was last changed yesterday with no BM output. Colostomy was assessed and noted hydrofera blue covering stoma opening which prevent stool from coming out. This was changed and management updated. Appetite fair. She is worried about some weight loss, however per P[facility] her weights have been stable around 180# since April 2025. Sleeping well. No pain complaints.     BP Readings from Last 3 Encounters:   06/16/25 116/66   06/12/25 121/70   06/12/25 105/75     Wt Readings from Last 5 Encounters:   06/16/25 82.5 kg (181 lb 12.8 oz)   06/12/25 85.6 kg (188 lb 11.2 oz)   06/11/25 87 kg (191 lb 12.8 oz)   05/27/25 82.7 kg (182 lb 6.4 oz)   05/27/25 81.6 kg (180 lb)     Allergies, and PMH/PSH reviewed in EPIC today.  REVIEW OF SYSTEMS:  4 point ROS including Respiratory, CV, GI and , other than that noted in the HPI,  is negative    Objective:   /66   Pulse 92   Temp 97  F (36.1  C)   Resp 18   Ht 1.737 m (5' 8.4\")   Wt 82.5 kg (181 lb 12.8 oz)   LMP 06/01/1988 (Approximate)   SpO2 99%   BMI 27.32 kg/m    GENERAL APPEARANCE:  Alert, in no distress, oriented, cooperative  ENT:  Mouth and posterior oropharynx normal, moist mucous membranes, [place]  EYES:  EOM, conjunctivae, lids, pupils and irises normal  NECK:  No adenopathy,masses or thyromegaly  RESP:  respiratory effort and palpation of chest normal, lungs clear to auscultation , no respiratory distress  CV:  regular rate and rhythm, no murmur, rub, or gallop, no edema, +2 pedal pulses  ABDOMEN:  normal bowel sounds, soft, nontender, no hepatosplenomegaly or other masses, no guarding or rebound  M/S:   Staff to assist for all ADLs, transfers and cares. Wheelchair for long distance needs  SKIN:  Inspection of skin and subcutaneous tissue baseline, " Palpation of skin and subcutaneous tissue baseline  NEURO:   Cranial nerves 2-12 are normal tested and grossly at patient's baseline, no purposeful movement in upper and lower extremities  PSYCH:  oriented X 3, memory impaired , affect and mood normal        Most Recent 3 CBC's:  Recent Labs   Lab Test 06/12/25  0550 06/11/25  0445 06/10/25  0509   WBC 8.4 10.2 10.6   HGB 7.6* 7.5* 7.4*   MCV 91 91 89    254 270     Most Recent 3 BMP's:  Recent Labs   Lab Test 06/12/25  0734 06/12/25  0550 06/11/25  2108 06/11/25  0723 06/11/25  0445 06/10/25  0918 06/10/25  0509   NA  --  141  --   --  141  --  139   POTASSIUM  --  4.2  --   --  4.4  --  4.5   CHLORIDE  --  107  --   --  108*  --  106   CO2  --  25  --   --  21*  --  21*   BUN  --  45.1*  --   --  46.7*  --  49.4*   CR  --  1.62*  --   --  1.55*  --  1.62*   ANIONGAP  --  9  --   --  12  --  12   KEILY  --  8.4*  --   --  8.4*  --  8.5*   GLC 95 107* 196*   < > 182*   < > 227*    < > = values in this interval not displayed.     Most Recent 2 LFT's:  Recent Labs   Lab Test 06/06/25  0850 06/04/25  0611 06/01/25  0406   AST  --  21 17   ALT  --  20 30   ALKPHOS  --  137 137   BILITOTAL 0.4 0.2 0.2     Most Recent Hemoglobin A1c:  Recent Labs   Lab Test 05/29/25  1515   A1C 6.3*     Most Recent ESR & CRP:  Recent Labs   Lab Test 06/08/25  0447 04/28/25  1213 04/15/25  0613 06/13/23  1820 08/26/19  2331   SED  --   --  61*  --  78*   CRP  --   --   --   --  180.0*   CRPI 34.00*   < >  --    < >  --     < > = values in this interval not displayed.     Most Recent Anemia Panel:  Recent Labs   Lab Test 06/12/25  0550 04/14/25  1410 04/07/25  0945 09/04/24  1008 06/15/23  1028   WBC 8.4   < > 6.5   < > 7.6   HGB 7.6*   < > 9.1*   < > 12.1   HCT 24.3*   < > 30.0*   < > 38.4   MCV 91   < > 88   < > 92      < > 351   < > 345   IRON  --   --  36*   < >  --    IRONSAT  --   --  13*   < >  --    FEB  --   --  277   < >  --    LAURA  --   --  44   < >  --    B12  --    --   --   --  456    < > = values in this interval not displayed.       ASSESSMENT/PLAN:  Diverticulitis complicated by abscess status post open sigmoidectomy with end colostomy 5/30/25  History of Sepsis due to Klebsiella pneumoniae and Pseudomonas aeruginosa bacteremia 5/28, likely of intra-abdominal origin   Hypotension due to sepsis  Comment: Long multiple hospitalization course. She required a slow steroid taper because of blood pressure dysregulations. We held her home antihypertensives while titrating steroids. We transitioned from meropenem to ceftolozane-tazobactam due to resistance (psuedomonas coverage) and switched vancomycin to linezolid in the setting of acute kidney injury for coverage of previous vancomycin-resistent enterococci (E Facecium). From infectious disease's perspective, there were no good oral antibiotic options and so she completed a 7-day IV antibiotic course at the hospital. There was new cloudy PATY drain output on 6/6 and ongoing leukocytosis but no new localizing symptoms so we continued to monitor and found. She had been seen in colorectal surgery clinic the day prior (5/27) where it was decided that she should undergo open sigmoidectomy with DLI. However, given her acute onset symptoms, we think she may need surgery sooner than anticipated. She is currently hemodynamically stable with a benign abdominal exam which doesn't warrant any sort of emergent intervention. Additionally, she has a complex past medical history so further optimization is required.   Plan:  -Follow up with ID as directed. Scheduled for 7/2/25  -Follow up with Colorectal Surgery as directed. Scheduled for 6/24 and 7/15/25  -High dose Vitamin A x 30 days post-op (through 6/30)  -CMP and CBC due 6/16/25--results pending  -BMP and CBC due 6/23/25  -Continue colostomy care as directed:  *Peristomal wound: With pouch changes - clean open wound to medial side of stoma with wound spray. Cut piece of Hydrofera Blue  "Classic to size of open area. Wet Hydrofera Blue (#[id number]) with saline and squeeze out excess, place in wound base, apply small bead of ostomy paste to the medial edge of hydrafera blue/Pt's skin to help hold in/seal hydrafera blue, then cover with additional ostomy ring to secure, apply wafer and caulk open creases around barrier ring, then attach pouch   *LLQ Colostomy pouching plan:   Pouching system: ostomy supplies pouches: Marina 57 FECAL ([id number]) ostomy supplies barrier: Marina 57mm SOFT CONVEX ([id number])  Accessories used: [facility] ostomy accessories: 2\" Cera Barrier Ring ([id number]) and Cavilon no sting barrier film ([id number])   Frequency of pouch changes: Twice weekly and PRN leakage  Bedside RN interventions: Change pouch PRN if leaking using the supplies above, Empty pouch when 1/3 to 1/2 full, ensure to clean pouch outlet after emptying to prevent odor, Notify [facility] for ongoing pouch leakage, Document stoma appearance and output volume, color, and consistency every shift, Encourage patient to empty pouch with assist, and Assist patient to measure and record output      Liver transplant in 2002 2/2 primary biliary cirrhosis  EBV +  Reactive Leukocytosis   Sjogren's syndrome  Comment: Chronic. Follows Dr. Ramirez. Transplant Hepatology was consulted and advised against sirolimus. PTA prednisone 9mg was resumed after weaning off IV hydrocortisone   Plan:  -Continue Tacrolimus Oral suspension 2mg/2mL BID. Must be suspension given unable to tolerate capsule due to allergy to filler--[facility]  -Continue prednisone 9mg daily for now. (5mg with 4 tablets of 1mg=9mg total)  -Continue PTA ursodiol 250 mg BID  -Follow up with transplant team as directed. Scheduled for 8/22/25  -Follow up with nephrology as directed. Scheduled for 7/23/25  -CMP and CBC due 6/16/25--results pending  -BMP and CBC due 6/23/25     Depression   Protein calorie deficit malnutrition    Comment: Chronic. Appetite " fair. She is worried about some weight loss, however per [facility] her weights have been stable around 180# since April 2025.  Plan:  -Monitor mood and behaviors  -Dietician to remain involved to assist with supplemental needs  -Start chocolate glucerna BID with meals for now--dietician to adjust accordingly  -Monitor for worsening s/symptoms of concerns  -Monitor for changes in mobility, eating and sleeping patterns  -Continue zoloft 50mg daily.   -ACP while on TCU  -CMP and CBC due 6/16/25--results pending  -BMP and CBC due 6/23/25     Mild Obstructive Sleep Apnea   Nocturnal Hypoxemia   Pleural effusions  SOB  Comment: Chronic. Sleep study in 2018 showing mild RASHIDA with recommendation to start CPAP. It does not appear that there was further follow up and not using CPAP. See results of previous CT scan as indicated above.   Plan:  -Monitor sleeping patterns  -Continue albuterol inhaler every 4 hours PRN. May HUNTER  -Monitor respiratory status  -PCP to consider sleep medicine referral  -CMP and CBC due 6/16/25--results pending  -BMP and CBC due 6/23/25     CKD3b w/ moderate proteinuria, at baseline  HTN  CVD/HLD  paroxysmal atrial fibrillation  Comment: Chronic. Baseline Cr. 1.5. History of dialysis at time of liver transplant 23 years ago. Recurrent AKIs and immunosuppressive medication toxicity. Not on SGLT2i 2/2 recurrent UTI. Chart review w/ unclear heart failure history, unconfirmed with EF 60-65% on 3/10/25. BNP 1446 elevated from 1 month ago. Given unclear HF history, trace bilateral LE edema, with some pulm edema noted on imaging, consideration for volume overload. However, without orthopnea or clear cough.   Plan:  -Continue WITHOUT antihypertensives at this time (stopped Amlodipine, hydralazine, metoprolol were stopped in hospital)  -Monitor BP and HR  -Continue PTA evolocumab q2week.   -Continue PTA eztimibe 10mg every day  -Continue apixaban 2.5mg BID  -Follow up with cardiology as directed. Scheduled for  6/17/25  -CMP and CBC due 6/16/25--results pending  -BMP and CBC due 6/23/25     Concern for transient ischemic attack  Comment: Acute. In the morning of 6/4, she stated that she had speech problems in the last couple of days since the surgery. She described that she has trouble getting her words out and that her speech did not feel as fluent as it was previously.  She denied any new numbness, tingling, headache or changes in vision. CT head was negative for intracranial bleed or other acute process and TTE with bubble study was also negative. It is possible that she may have had a TIA following surgery as anticoagulation had been held. MRI brain was negative for acute ischemic changes and her speech has returned to baseline.   Plan:  -Continue PTA Xarelto   -No need for neuro follow-up   -CMP and CBC due 6/16/25--results pending  -BMP and CBC due 6/23/25     T2DM  (A1c 6.8% 4/14/25)  Comment: Chronic. Last A1c 6.8% on 4/14/25.   Plan:  -Continue PTA linagliptin 5mg every day  -Continue PTA gabapentin 300mg at bedtime  -Blood Glucose monitoring back to previous schedule of BID. HOLD off on using freestyle phil 2 sensor while on TCU  -CMP and CBC due 6/16/25--results pending  -BMP and CBC due 6/23/25  -Obtain hgb A1c as directed. Due July 2025     Acute kidney injury on CKD3, resolved   Bilateral stent placement per urology 5/29  Acute tubular necrosis  Comment: Her baseline creatinine is 1.3-1.6. She had an DERREK in the setting of hypotension, shock, and nephrotoxic meds which resolved after receiving IV fluids and improved PO after surgery.   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 Wound does not look infected. She now report having skin lesion to left anterior forearm. Recent SCC  with S/p MOHs procedure to left face, suspect similar issues. Will defer to dermatology  Plan:  -Monitor for worsening s/symptoms of concerns  -Follow up with dermatology      Acute blood loss anemia   Anemia of critical illness  coagulopathy due to cirrhosis and surgical blood loss    thrombocytopenia   Comment: Chronic. Per recent hospitalization-Did not find any signs of overt bleeding and her ostomy output and vitals were stable. Continued her ferous sulfate and folic acid inpatient. She received 1 unit of packed red blood cells on 6/7. Stable at the time of discharge. Now average around mid 7  Plan:  -Monitor bleeding risks  -Continue PTA ferrous sulfate daily  -Recommend transfusion if hgb <7  -Continue folic acid daily  -CMP and CBC due 6/16/25--results pending  -BMP and CBC due 6/23/25     Hypothyroidism  Papillary thyroid carcinoma  Chronic. Recent TSH~1.72 on 4/4/25. S/p thyroidectomy 2010  Plan;  -Continue PTA levothyroxine 175mcg qD   -Monitor for worsening s/symptoms of concerns           TSH   Date Value Ref Range Status   04/04/2025 1.72 0.30 - 4.20 uIU/mL Final   05/20/2019 12.11 (H) 0.40 - 4.00 mU/L Final      Chronic Shoulder Pain  Osteoarthritis   Lower back pain  Comment: Chronic. stable  Plan:  -Monitor pain complaints  -Continue diclofenac gel application to painful areas QID  -Continue Tylenol 1000mg BID   -Continue gabapentin 300mg at HS--only able to tolerate liquid given gelatin capsule allergy     Constipation   Nausea  GERD  Comment: Chronic. S/T polypharmacy.   Plan:  -Monitor BM patterns  -Continue TUMS 1000mg every 4 hours PRN  -Continue beneFiber qd    -Zofran PRN     Epistaxis  Comment: Acute on chronic. She reports occasional nose bleed at times. No episodes while on TCU.   Plan:  -Monitor bleeding risks  -Monitor for worsening s/symptoms of concerns  -Afrin BID PRN on TCU if warranted     Vaginal bleeding  Comment: Acute on chronic. Referred to OBGYN. Last menstrual period she  reports has been 40 years ago or so. Known family history of endometrial cancer that metastasized to bowel per her reports. She reports her mother had extensive genetic testing for review in EPIC. Reports mother name: Anne Yap  3/9/23 for reference if indicated. Per OBGYN on 25:  If the endometrial stripe is greater than 4 mm or if it is less than 4 mm and she continues to have bleeding, she will need tissue evaluation with either office endometrial biopsy or hysteroscopy with D&C in the operating room. She does not believe she can tolerate an endometrial biopsy in the clinic, so we discussed doing this as an outpatient procedure.  Plan:  -Monitor for worsening s/symptoms of concerns  -Follow up with OBGYN as directed  -CMP and CBC due 25--results pending  -BMP and CBC due 25     Physical deconditioning  Generalized muscle weakness  History of falls  Comment: Acute on chronic. Known poor history PTA. [facility] given concerns.   Plan:  -Continue Physical therapy and Occupational therapy  -Recommend cognitive testing on site  -Highly recommend MIKEY compliance post TCU    Electronically signed by:  Dr. Manda Flor DNP, APRN, FNP-C, WCS-C, EDS-C     Provider reviewed records from facility, and interpreted most recent imaging/lab work, and vital signs.   Acute and chronic conditions managed by writer. Have been reviewed during today's exam

## 2025-06-13 NOTE — LETTER
6/13/2025      Luz Thompson  [address]  [facility] [address]        [facility] GERIATRICS    PRIMARY CARE PROVIDER AND CLINIC:  Daniel Hidalgo MD, [address] [facility] / [facility] [id number]***  Chief Complaint   Patient presents with    Hospital F/U      [place] [facility] Record Number:  [id number]  Place of Service where encounter took place:  No question data found.    Luz Thompson  is a 75 year old  (1950), admitted to the above facility from  [facility]. Hospital stay 5/28/25 through 6/12/25..   HPI:    ***    CODE STATUS/ADVANCE DIRECTIVES DISCUSSION:  Full Code  CPR/Full code   ALLERGIES:   Allergies   Allergen Reactions    Fluconazole Hives and Itching     Full body hives  **Intradermal skin testing negative**  [See intradermal skin testing results from 8/2/2019]    Mycophenolate Diarrhea and Nausea and Vomiting     Patient stated it was chronic and lasted months      Penicillins Anaphylaxis, Hives, Itching and Rash     **Intradermal skin testing negative**  [See intradermal skin testing results from 8/2/2019]      Simvastatin Muscle Pain (Myalgia)     severe  Other reaction(s): Myalgia caused by statin    Methotrexate Other (See Comments)     Other reaction(s): Sore  Sores in mouth, esophagus, and stomach.       Morphine And Codeine Itching and Other (See Comments)     Psych disturbance  Other reaction(s): Confusion, Mood alteration    Quinolones Anxiety, Dizziness, Headache, Other (See Comments), Palpitations and Unknown     Other reaction(s): Hyperactive behavior, Lightheadedness, Mood alteration    Dizzy, light headed    Dizziness, shaky, and jumpy    Capsules, Empty Gelatin [Gelatin]     Carvedilol Diarrhea     Per pt - severe diarrhea and LE swelling  + tolerating Toprol    Lansoprazole Diarrhea    Strawberry Extract     Azithromycin Itching     [See intradermal skin testing results from 8/2/2019]    Bactrim  "[Sulfamethoxazole-Trimethoprim] Other (See Comments)     Numb mouth, tingling lips (treated with anti-histamines)    Cephalosporins Itching     [See intradermal skin testing results from 8/2/2019]    Ciprofloxacin Hcl Other (See Comments) and Dizziness     Insomnia, mood lability, Irregular heart beat         Doxycycline Itching and Unknown     [See intradermal skin testing results from 8/2/2019]    Lisinopril Cough    Omeprazole Itching    Tigecycline Other (See Comments)     Perioral numbness in 2025 with long-term use    Tolectin [Nsaids] Rash    Tolmetin Rash and Itching    Tramadol Rash, Hives and Itching      PAST MEDICAL HISTORY:   Past Medical History:   Diagnosis Date    Anemia of chronic disease 10/17/2011    Anxiety     CKD (chronic kidney disease) stage 3, GFR 30-59 ml/min (H) 04/04/2012    Coccidioidomycosis, 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      PAST SURGICAL HISTORY:   has a past surgical history that includes Thyroidectomy (03/2010); appendectomy (1961); Cholecystectomy (1991); KNEE SCOPE,SINGLE MENISECTOMY (Right, 08/10/2012); Transplant liver recipient living unrelated (05/2002); knee surgery (1966); picc insertion (09/18/2013); Endoscopic retrograde cholangiopancreatogram (09/19/2013); cataract iol, rt/lt; picc insertion (02/21/2014); Colonoscopy (03/10/2014); Endobronchial ultrasound flexible (N/A, 09/29/2017); ear drum repair; Cystoscopy; Insert stent ureter (Bilateral, 5/29/2025); and Colectomy with colostomy, combined (N/A, 5/29/2025).  FAMILY HISTORY: family history includes Allergies in her son; Alzheimer Disease in her paternal grandfather; Breast Cancer in an other family member; Cancer - colorectal in her maternal grandmother; Cerebrovascular Disease in her paternal grandmother; Endometrial Cancer in her mother; Glaucoma in her maternal grandfather; Heart Disease in her maternal grandfather and paternal grandfather; Hyperlipidemia in her mother; Hypertension in her mother and paternal grandmother; Macular Degeneration in her father; Neurologic Disorder in her daughter; Prostate Cancer in her father.  SOCIAL HISTORY:   reports that she quit smoking about 40 years ago. Her smoking use included cigarettes. She started smoking about 58 years ago. She has a 18 pack-year smoking history. She has never used smokeless tobacco. She reports current alcohol use. She reports that she does not use drugs.  Patient's living condition: lives alone    Post Discharge Medication Reconciliation Status:   MED REC REQUIRED{TIP  Click the link below to document or use med rec list, use list to pull in response :137074}  Post Medication Reconciliation Status: {MED REC LIST:818032}       No current facility-administered medications for this visit.     Current Outpatient Medications   Medication Sig  Dispense Refill    methocarbamol (ROBAXIN) 500 MG tablet Take 1 tablet (500 mg) by mouth 4 times daily as needed for muscle spasms.      nystatin (MYCOSTATIN) 745534 UNIT/ML suspension Take 10 mLs (1,000,000 Units) by mouth 4 times daily. 60 mL 0    PROGRAF (BRAND) 1 MG/ML suspension Take 2 mLs (2 mg) by mouth 2 times daily.      vitamin A 3 MG (02264 UNITS) capsule Take 2 capsules (20,000 Units) by mouth daily for 18 days.       Facility-Administered Medications Ordered in Other Visits   Medication Dose Route Frequency Provider Last Rate Last Admin    acetaminophen (TYLENOL) tablet 975 mg  975 mg Oral BID PRN Lisa Ferreira MD        albuterol (PROVENTIL) neb solution 2.5 mg  2.5 mg Nebulization Q4H PRN Jah Bettencourt PA-C        [Held by provider] amLODIPine (NORVASC) tablet 5 mg  5 mg Oral Daily Tess Rodriguez MD   5 mg at 06/01/25 0741    apixaban ANTICOAGULANT (ELIQUIS) tablet 2.5 mg  2.5 mg Oral BID Negrito Christensen MD   2.5 mg at 06/12/25 0955    calcitRIOL (ROCALTROL) capsule 0.25 mcg  0.25 mcg Oral Daily Jah Bettencourt PA-C   0.25 mcg at 06/12/25 0955    calcium carbonate (TUMS) chewable tablet 1,000 mg  1,000 mg Oral Q4H PRN Jah Bettencourt PA-C   500 mg at 06/03/25 0304    glucose gel 15-30 g  15-30 g Oral Q15 Min PRN Tess Rodriguez MD        Or    dextrose 50 % injection 25-50 mL  25-50 mL Intravenous Q15 Min PRN Tess Rodriguez MD        Or    glucagon injection 1 mg  1 mg Subcutaneous Q15 Min PRN Tess Rodriguez MD        diclofenac (VOLTAREN) 1 % topical gel 4 g  4 g Topical 4x Daily PRN Lisa Ferreira MD        diphenhydrAMINE (BENADRYL) capsule 25 mg  25 mg Oral Q6H PRN Negrito Christensen MD        Or    diphenhydrAMINE (BENADRYL) injection 25 mg  25 mg Intravenous Q6H PRN Negrito Christensen MD        diphenhydrAMINE (BENADRYL) elixir 25 mg  25 mg Oral or Feeding Tube Q6H PRN Aleja Antunez MD   25 mg at 06/07/25 1831    estradiol (ESTRACE) cream 2 g  2 g Vaginal Once per day on Monday  Wednesday Friday Jah Bettencourt PA-C   2 g at 06/11/25 2007    ezetimibe (ZETIA) tablet 10 mg  10 mg Oral At Bedtime Jah Bettencourt PA-C   10 mg at 06/11/25 2110    ferrous sulfate (FEROSUL) tablet 325 mg  325 mg Oral At Bedtime Jah Bettencourt PA-C   325 mg at 06/07/25 2109    folic acid (FOLVITE) tablet 1,000 mcg  1,000 mcg Oral At Bedtime Jah Bettencourt PA-C   1,000 mcg at 06/11/25 2110    gabapentin (NEURONTIN) solution 300 mg  300 mg Oral At Bedtime Jah Bettencourt PA-C   300 mg at 06/11/25 2112    hydrALAZINE (APRESOLINE) injection 5 mg  5 mg Intravenous Q1H PRN Surekha Moncada MD        [Held by provider] hydrALAZINE (APRESOLINE) tablet 50 mg  50 mg Oral TID Jah Bettencourt PA-C        insulin aspart (NovoLOG) injection (RAPID ACTING)  1-10 Units Subcutaneous TID AC Negrito Christensen MD   2 Units at 06/11/25 1842    insulin aspart (NovoLOG) injection (RAPID ACTING)  1-7 Units Subcutaneous At Bedtime Negrito Christensen MD   2 Units at 06/08/25 2317    levothyroxine (SYNTHROID/LEVOTHROID) tablet 175 mcg  175 mcg Oral QAM AC Jah Bettencourt PA-C   175 mcg at 06/12/25 0955    Lidocaine (LIDOCARE) 4 % Patch 2 patch  2 patch Transdermal Q24H PRN Lisa Ferreira MD   2 patch at 06/11/25 1406    lidocaine (LMX4) cream   Topical Q1H PRN Jah Bettencourt PA-C        lidocaine 1 % 0.1-1 mL  0.1-1 mL Other Q1H PRN Jah Bettencourt PA-C        melatonin tablet 1 mg  1 mg Oral At Bedtime Josefina Perea DO        methocarbamol (ROBAXIN) tablet 500 mg  500 mg Oral 4x Daily PRN Arlene Tang MD        [Held by provider] metoprolol tartrate (LOPRESSOR) half-tab 12.5 mg  12.5 mg Oral BID Josefina Perea DO   12.5 mg at 05/31/25 0757    multivitamin, therapeutic (THERA-VIT) tablet 1 tablet  1 tablet Oral Daily Josefina Perea DO   1 tablet at 06/12/25 0955    naloxone (NARCAN) injection 0.2 mg  0.2 mg Intravenous Q2 Min PRN Aleja Antunez MD        Or    naloxone (NARCAN) injection 0.4 mg  0.4 mg Intravenous Q2 Min PRN  Aleja Antunez MD        Or    naloxone (NARCAN) injection 0.2 mg  0.2 mg Intramuscular Q2 Min PRN Aleja Antunez MD        Or    naloxone (NARCAN) injection 0.4 mg  0.4 mg Intramuscular Q2 Min PRN Aleja Antunez MD        Nutrisource Fiber PO packet 1 each  1 packet Oral Daily Jah Bettencourt PA-C   1 each at 06/12/25 0956    nystatin (MYCOSTATIN) suspension 1,000,000 Units  1,000,000 Units Oral 4x Daily Jah Bettencourt PA-C   1,000,000 Units at 06/12/25 0955    omega-3 acid ethyl esters (LOVAZA) capsule 1 g  1 g Oral BID Tess Rodriguez MD   1 g at 06/12/25 0955    ondansetron (ZOFRAN) injection 4 mg  4 mg Intravenous Q6H PRN Jah Bettencourt PA-C   4 mg at 06/07/25 1429    oxyCODONE (ROXICODONE) tablet 5 mg  5 mg Oral Q3H PRN Josefina Perea DO   5 mg at 06/05/25 2141    predniSONE (DELTASONE) tablet 9 mg  9 mg Oral Daily Ofelia Patel MD   9 mg at 06/12/25 0955    sennosides (SENOKOT) tablet 2 tablet  2 tablet Oral or Feeding Tube Daily Negrito Christensen MD   2 tablet at 06/11/25 2006    sertraline (ZOLOFT) tablet 50 mg  50 mg Oral Daily Jah Bettencourt PA-C   50 mg at 06/12/25 0955    sodium chloride (PF) 0.9% PF flush 3 mL  3 mL Intracatheter Q8H LUZMARIA Jah Bettencourt PA-C   3 mL at 06/10/25 2109    sodium chloride (PF) 0.9% PF flush 3 mL  3 mL Intracatheter q1 min prn Jah Bettencourt PA-C   3 mL at 06/08/25 0952    tacrolimus (PROGRAF BRAND) suspension 2 mg  2 mg Oral BID IS Luzma Osman APRN CNP   2 mg at 06/12/25 0954    ursodiol (ACTIGALL) tablet 250 mg  250 mg Oral BID Jah Bettencourt PA-C   250 mg at 06/12/25 0955    vitamin A 3 MG (56087 UNITS) capsule 20,000 Units  20,000 Units Oral Daily Al Campbell MD   20,000 Units at 06/12/25 0955       ROS:  10 point ROS of systems including Constitutional, Eyes, Respiratory, Cardiovascular, Gastroenterology, Genitourinary, Integumentary, Musculoskeletal, Psychiatric were all negative except for pertinent positives noted in my  "HPI.    Vitals:  LMP 06/01/1988 (Approximate)   Exam:  GENERAL APPEARANCE:  {[facility] GENERAL APPEARANCE:402128}  ENT:  {[facility] ENT PE:440965::\"Mouth and posterior oropharynx normal, moist mucous membranes\"}  EYES:  {[facility] EYES PE :597929::\"EOM, conjunctivae, lids, pupils and irises normal\"}  RESP:  {[facility] RESP PE:965765}  CV:  {[facility] CV PE:620028}  ABDOMEN:  {[facility] GI PE:950659::\"normal bowel sounds, soft, nontender, no hepatosplenomegaly or other masses\"}  M/S:   {[facility] M/S PE:224383}  SKIN:  {[facility] PE:094564}  NEURO:   {[facility] NEURO PE:424926}  PSYCH:  {[facility] PSYCH PE:947932}    Lab/Diagnostic data:  {fgslab:486440}    ASSESSMENT/PLAN:    {FGS DX2:949307}    Orders:  {fgsorders:620187}  ***    Electronically signed by:  Annmarie Cobos MA ***                    Sincerely,        LIZ Obando CNP    Electronically signed  " 0.2

## 2025-06-14 PROCEDURE — 36415 COLL VENOUS BLD VENIPUNCTURE: CPT | Performed by: NURSE PRACTITIONER

## 2025-06-14 PROCEDURE — 86481 TB AG RESPONSE T-CELL SUSP: CPT | Performed by: NURSE PRACTITIONER

## 2025-06-15 ENCOUNTER — LAB REQUISITION (OUTPATIENT)
Dept: LAB | Facility: CLINIC | Age: 75
End: 2025-06-15
Payer: MEDICARE

## 2025-06-15 DIAGNOSIS — Z11.1 ENCOUNTER FOR SCREENING FOR RESPIRATORY TUBERCULOSIS: ICD-10-CM

## 2025-06-16 ENCOUNTER — TRANSITIONAL CARE UNIT VISIT (OUTPATIENT)
Dept: GERIATRICS | Facility: CLINIC | Age: 75
End: 2025-06-16
Payer: MEDICARE

## 2025-06-16 VITALS
RESPIRATION RATE: 18 BRPM | DIASTOLIC BLOOD PRESSURE: 66 MMHG | OXYGEN SATURATION: 99 % | HEIGHT: 68 IN | TEMPERATURE: 97 F | HEART RATE: 92 BPM | WEIGHT: 181.8 LBS | SYSTOLIC BLOOD PRESSURE: 116 MMHG | BODY MASS INDEX: 27.55 KG/M2

## 2025-06-16 DIAGNOSIS — G47.33 OSA (OBSTRUCTIVE SLEEP APNEA): ICD-10-CM

## 2025-06-16 DIAGNOSIS — M81.0 AGE-RELATED OSTEOPOROSIS WITHOUT CURRENT PATHOLOGICAL FRACTURE: ICD-10-CM

## 2025-06-16 DIAGNOSIS — N18.32 STAGE 3B CHRONIC KIDNEY DISEASE (H): ICD-10-CM

## 2025-06-16 DIAGNOSIS — Z94.4 STATUS POST LIVER TRANSPLANTATION (H): ICD-10-CM

## 2025-06-16 DIAGNOSIS — M35.00 SJOGREN'S SYNDROME, WITH UNSPECIFIED ORGAN INVOLVEMENT: ICD-10-CM

## 2025-06-16 DIAGNOSIS — R53.81 PHYSICAL DECONDITIONING: ICD-10-CM

## 2025-06-16 DIAGNOSIS — M62.81 GENERALIZED MUSCLE WEAKNESS: ICD-10-CM

## 2025-06-16 DIAGNOSIS — G47.34 NOCTURNAL HYPOXEMIA: ICD-10-CM

## 2025-06-16 DIAGNOSIS — D84.9 IMMUNOCOMPROMISED: ICD-10-CM

## 2025-06-16 DIAGNOSIS — I50.32 CHRONIC DIASTOLIC HEART FAILURE (H): ICD-10-CM

## 2025-06-16 DIAGNOSIS — Z91.81 PERSONAL HISTORY OF FALL: ICD-10-CM

## 2025-06-16 DIAGNOSIS — E46 PROTEIN MALNUTRITION: ICD-10-CM

## 2025-06-16 DIAGNOSIS — R04.0 EPISTAXIS: ICD-10-CM

## 2025-06-16 DIAGNOSIS — E03.9 HYPOTHYROIDISM, UNSPECIFIED TYPE: ICD-10-CM

## 2025-06-16 DIAGNOSIS — Z85.828 S/P MOHS SURGERY FOR BASAL CELL CARCINOMA: ICD-10-CM

## 2025-06-16 DIAGNOSIS — B99.9 INTRA-ABDOMINAL INFECTION: ICD-10-CM

## 2025-06-16 DIAGNOSIS — C73 PAPILLARY THYROID CARCINOMA (H): ICD-10-CM

## 2025-06-16 DIAGNOSIS — G89.29 CHRONIC SHOULDER PAIN, UNSPECIFIED LATERALITY: ICD-10-CM

## 2025-06-16 DIAGNOSIS — N93.9 VAGINAL BLEEDING: ICD-10-CM

## 2025-06-16 DIAGNOSIS — M54.50 BILATERAL LOW BACK PAIN WITHOUT SCIATICA, UNSPECIFIED CHRONICITY: ICD-10-CM

## 2025-06-16 DIAGNOSIS — Z90.49 S/P LAPAROSCOPIC-ASSISTED SIGMOIDECTOMY: ICD-10-CM

## 2025-06-16 DIAGNOSIS — D64.9 CHRONIC ANEMIA: ICD-10-CM

## 2025-06-16 DIAGNOSIS — Z98.890 S/P MOHS SURGERY FOR BASAL CELL CARCINOMA: ICD-10-CM

## 2025-06-16 DIAGNOSIS — Z93.3 COLOSTOMY PRESENT (H): ICD-10-CM

## 2025-06-16 DIAGNOSIS — N18.32 TYPE 2 DIABETES MELLITUS WITH STAGE 3B CHRONIC KIDNEY DISEASE, WITHOUT LONG-TERM CURRENT USE OF INSULIN (H): ICD-10-CM

## 2025-06-16 DIAGNOSIS — M25.519 CHRONIC SHOULDER PAIN, UNSPECIFIED LATERALITY: ICD-10-CM

## 2025-06-16 DIAGNOSIS — Z86.19 HX OF SEPSIS: ICD-10-CM

## 2025-06-16 DIAGNOSIS — K59.01 SLOW TRANSIT CONSTIPATION: ICD-10-CM

## 2025-06-16 DIAGNOSIS — F32.A DEPRESSION, UNSPECIFIED DEPRESSION TYPE: ICD-10-CM

## 2025-06-16 DIAGNOSIS — C44.329 SQUAMOUS CELL CANCER OF SKIN OF LEFT CHEEK: ICD-10-CM

## 2025-06-16 DIAGNOSIS — L21.9 SEBORRHEIC DERMATITIS: ICD-10-CM

## 2025-06-16 DIAGNOSIS — D72.829 LEUKOCYTOSIS, UNSPECIFIED TYPE: ICD-10-CM

## 2025-06-16 DIAGNOSIS — Z43.3 COLOSTOMY CARE (H): ICD-10-CM

## 2025-06-16 DIAGNOSIS — K21.00 GASTROESOPHAGEAL REFLUX DISEASE WITH ESOPHAGITIS, UNSPECIFIED WHETHER HEMORRHAGE: ICD-10-CM

## 2025-06-16 DIAGNOSIS — I95.9 HYPOTENSION, UNSPECIFIED HYPOTENSION TYPE: ICD-10-CM

## 2025-06-16 DIAGNOSIS — N17.9 AKI (ACUTE KIDNEY INJURY): ICD-10-CM

## 2025-06-16 DIAGNOSIS — Z98.890 HISTORY OF STENT INSERTION OF RENAL ARTERY: ICD-10-CM

## 2025-06-16 DIAGNOSIS — R11.0 NAUSEA: ICD-10-CM

## 2025-06-16 DIAGNOSIS — Z86.73 HISTORY OF TIA (TRANSIENT ISCHEMIC ATTACK) AND STROKE: ICD-10-CM

## 2025-06-16 DIAGNOSIS — E11.22 TYPE 2 DIABETES MELLITUS WITH STAGE 3B CHRONIC KIDNEY DISEASE, WITHOUT LONG-TERM CURRENT USE OF INSULIN (H): ICD-10-CM

## 2025-06-16 DIAGNOSIS — E78.5 HYPERLIPIDEMIA LDL GOAL <70: ICD-10-CM

## 2025-06-16 DIAGNOSIS — K57.32 DIVERTICULITIS OF COLON: Primary | ICD-10-CM

## 2025-06-16 DIAGNOSIS — I48.0 PAF (PAROXYSMAL ATRIAL FIBRILLATION) (H): ICD-10-CM

## 2025-06-16 DIAGNOSIS — I10 HYPERTENSION GOAL BP (BLOOD PRESSURE) < 140/80: ICD-10-CM

## 2025-06-16 LAB
ALBUMIN SERPL BCG-MCNC: 2.8 G/DL (ref 3.5–5.2)
ALP SERPL-CCNC: 93 U/L (ref 40–150)
ALT SERPL W P-5'-P-CCNC: 18 U/L (ref 0–50)
ANION GAP SERPL CALCULATED.3IONS-SCNC: 12 MMOL/L (ref 7–15)
AST SERPL W P-5'-P-CCNC: 22 U/L (ref 0–45)
BASOPHILS # BLD MANUAL: 0.1 10E3/UL (ref 0–0.2)
BASOPHILS NFR BLD MANUAL: 1 %
BILIRUB SERPL-MCNC: <0.2 MG/DL
BUN SERPL-MCNC: 33.5 MG/DL (ref 8–23)
CALCIUM SERPL-MCNC: 8.4 MG/DL (ref 8.8–10.4)
CHLORIDE SERPL-SCNC: 105 MMOL/L (ref 98–107)
CREAT SERPL-MCNC: 1.74 MG/DL (ref 0.51–0.95)
CRP SERPL-MCNC: 23.4 MG/L
EGFRCR SERPLBLD CKD-EPI 2021: 30 ML/MIN/1.73M2
ELLIPTOCYTES BLD QL SMEAR: SLIGHT
EOSINOPHIL # BLD MANUAL: 0.2 10E3/UL (ref 0–0.7)
EOSINOPHIL NFR BLD MANUAL: 2 %
ERYTHROCYTE [DISTWIDTH] IN BLOOD BY AUTOMATED COUNT: 16.8 % (ref 10–15)
GAMMA INTERFERON BACKGROUND BLD IA-ACNC: 0.04 IU/ML
GLUCOSE SERPL-MCNC: 93 MG/DL (ref 70–99)
HCO3 SERPL-SCNC: 22 MMOL/L (ref 22–29)
HCT VFR BLD AUTO: 24.8 % (ref 35–47)
HGB BLD-MCNC: 7.5 G/DL (ref 11.7–15.7)
LYMPHOCYTES # BLD MANUAL: 1.9 10E3/UL (ref 0.8–5.3)
LYMPHOCYTES NFR BLD MANUAL: 21 %
M TB IFN-G BLD-IMP: NEGATIVE
M TB IFN-G CD4+ BCKGRND COR BLD-ACNC: 8.25 IU/ML
MCH RBC QN AUTO: 28.7 PG (ref 26.5–33)
MCHC RBC AUTO-ENTMCNC: 30.2 G/DL (ref 31.5–36.5)
MCV RBC AUTO: 95 FL (ref 78–100)
METAMYELOCYTES # BLD MANUAL: 0.1 10E3/UL
METAMYELOCYTES NFR BLD MANUAL: 1 %
MITOGEN IGNF BCKGRD COR BLD-ACNC: 0 IU/ML
MITOGEN IGNF BCKGRD COR BLD-ACNC: 0 IU/ML
MONOCYTES # BLD MANUAL: 0.7 10E3/UL (ref 0–1.3)
MONOCYTES NFR BLD MANUAL: 8 %
MYELOCYTES # BLD MANUAL: 0.2 10E3/UL
MYELOCYTES NFR BLD MANUAL: 2 %
NEUTROPHILS # BLD MANUAL: 5.8 10E3/UL (ref 1.6–8.3)
NEUTROPHILS NFR BLD MANUAL: 66 %
NRBC # BLD AUTO: 0.1 10E3/UL
NRBC BLD MANUAL-RTO: 1 %
PLAT MORPH BLD: ABNORMAL
PLATELET # BLD AUTO: 210 10E3/UL (ref 150–450)
POTASSIUM SERPL-SCNC: 4.9 MMOL/L (ref 3.4–5.3)
PROT SERPL-MCNC: 5.5 G/DL (ref 6.4–8.3)
QUANTIFERON MITOGEN: 8.29 IU/ML
QUANTIFERON NIL TUBE: 0.04 IU/ML
QUANTIFERON TB1 TUBE: 0.04 IU/ML
QUANTIFERON TB2 TUBE: 0.04
RBC # BLD AUTO: 2.61 10E6/UL (ref 3.8–5.2)
RBC MORPH BLD: ABNORMAL
SODIUM SERPL-SCNC: 139 MMOL/L (ref 135–145)
WBC # BLD AUTO: 8.8 10E3/UL (ref 4–11)

## 2025-06-16 PROCEDURE — 85014 HEMATOCRIT: CPT | Performed by: NURSE PRACTITIONER

## 2025-06-16 PROCEDURE — 86140 C-REACTIVE PROTEIN: CPT | Performed by: NURSE PRACTITIONER

## 2025-06-16 PROCEDURE — 36415 COLL VENOUS BLD VENIPUNCTURE: CPT | Performed by: NURSE PRACTITIONER

## 2025-06-16 PROCEDURE — 82247 BILIRUBIN TOTAL: CPT | Performed by: NURSE PRACTITIONER

## 2025-06-16 PROCEDURE — 99309 SBSQ NF CARE MODERATE MDM 30: CPT | Performed by: NURSE PRACTITIONER

## 2025-06-16 PROCEDURE — 85007 BL SMEAR W/DIFF WBC COUNT: CPT | Performed by: NURSE PRACTITIONER

## 2025-06-16 PROCEDURE — 85018 HEMOGLOBIN: CPT | Performed by: NURSE PRACTITIONER

## 2025-06-16 PROCEDURE — 84295 ASSAY OF SERUM SODIUM: CPT | Performed by: NURSE PRACTITIONER

## 2025-06-16 NOTE — LETTER
6/16/2025      Luz Thompson  [address]  [facility] [address]        [facility]    Chief Complaint   Patient presents with     RECHECK     HPI:  Luz Thompson is a 75 year old  (1950), who is being seen today for an episodic care visit at: [facility] ([facility])([facility]) [[id number]]. Today's concern is: The primary encounter diagnosis was Diverticulitis of colon. Diagnoses of Intra-abdominal infection, Hx of sepsis, Hypotension, unspecified hypotension type, S/P laparoscopic-assisted sigmoidectomy, Colostomy present (H), Physical deconditioning, Generalized muscle weakness, Chronic shoulder pain, unspecified laterality, Age-related osteoporosis without current pathological fracture, Bilateral low back pain without sciatica, unspecified chronicity, Chronic anemia, Stage 3b chronic kidney disease (H), 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, Hypothyroidism, unspecified type, S/P Mohs surgery for basal cell carcinoma, Hypertension goal BP (blood pressure) < 140/80, Nocturnal hypoxemia, Hyperlipidemia LDL goal <70, Chronic diastolic heart failure (H), RASHIDA (obstructive sleep apnea), Status post liver transplantation (H), Type 2 diabetes mellitus with stage 3b chronic kidney disease, without long-term current use of insulin (H), PAF (paroxysmal atrial fibrillation) (H), Depression, unspecified depression type, Leukocytosis, unspecified type, Sjogren's syndrome, with unspecified organ involvement, Protein malnutrition, Immunocompromised, Seborrheic dermatitis, Papillary thyroid carcinoma (H), Vaginal bleeding, Colostomy care (H), Epistaxis, Personal history of fall, Gastroesophageal reflux disease with esophagitis, unspecified whether hemorrhage, Nausea, Slow transit constipation, DERREK (acute kidney injury), History of stent insertion of renal artery, and History of TIA (transient ischemic attack) and stroke were also pertinent to this visit.    Met with  "patient who was found lying in bed eating breakfast. She denies any chest pain, palpitations, shortness of breath, [place], lightheadedness, dizziness, or cough. Denies any abdominal discomfort. Denies N&V. Denies dysuria or frequency. No further vaginal bleeding reported. She reports colostomy pouch was last changed yesterday with no BM output. Colostomy was assessed and noted hydrofera blue covering stoma opening which prevent stool from coming out. This was changed and management updated. Appetite fair. She is worried about some weight loss, however per PCC her weights have been stable around 180# since April 2025. Sleeping well. No pain complaints.     BP Readings from Last 3 Encounters:   06/16/25 116/66   06/12/25 121/70   06/12/25 105/75     Wt Readings from Last 5 Encounters:   06/16/25 82.5 kg (181 lb 12.8 oz)   06/12/25 85.6 kg (188 lb 11.2 oz)   06/11/25 87 kg (191 lb 12.8 oz)   05/27/25 82.7 kg (182 lb 6.4 oz)   05/27/25 81.6 kg (180 lb)     Allergies, and PMH/PSH reviewed in EPIC today.  REVIEW OF SYSTEMS:  4 point ROS including Respiratory, CV, GI and , other than that noted in the HPI,  is negative    Objective:   /66   Pulse 92   Temp 97  F (36.1  C)   Resp 18   Ht 1.737 m (5' 8.4\")   Wt 82.5 kg (181 lb 12.8 oz)   LMP 06/01/1988 (Approximate)   SpO2 99%   BMI 27.32 kg/m    GENERAL APPEARANCE:  Alert, in no distress, oriented, cooperative  ENT:  Mouth and posterior oropharynx normal, moist mucous membranes, [place]  EYES:  EOM, conjunctivae, lids, pupils and irises normal  NECK:  No adenopathy,masses or thyromegaly  RESP:  respiratory effort and palpation of chest normal, lungs clear to auscultation , no respiratory distress  CV:  regular rate and rhythm, no murmur, rub, or gallop, no edema, +2 pedal pulses  ABDOMEN:  normal bowel sounds, soft, nontender, no hepatosplenomegaly or other masses, no guarding or rebound  M/S:   Staff to assist for all ADLs, transfers and cares. Wheelchair for long " distance needs  SKIN:  Inspection of skin and subcutaneous tissue baseline, Palpation of skin and subcutaneous tissue baseline  NEURO:   Cranial nerves 2-12 are normal tested and grossly at patient's baseline, no purposeful movement in upper and lower extremities  PSYCH:  oriented X 3, memory impaired , affect and mood normal        Most Recent 3 CBC's:  Recent Labs   Lab Test 06/12/25  0550 06/11/25  0445 06/10/25  0509   WBC 8.4 10.2 10.6   HGB 7.6* 7.5* 7.4*   MCV 91 91 89    254 270     Most Recent 3 BMP's:  Recent Labs   Lab Test 06/12/25  0734 06/12/25  0550 06/11/25  2108 06/11/25  0723 06/11/25 0445 06/10/25  0918 06/10/25  0509   NA  --  141  --   --  141  --  139   POTASSIUM  --  4.2  --   --  4.4  --  4.5   CHLORIDE  --  107  --   --  108*  --  106   CO2  --  25  --   --  21*  --  21*   BUN  --  45.1*  --   --  46.7*  --  49.4*   CR  --  1.62*  --   --  1.55*  --  1.62*   ANIONGAP  --  9  --   --  12  --  12   KEILY  --  8.4*  --   --  8.4*  --  8.5*   GLC 95 107* 196*   < > 182*   < > 227*    < > = values in this interval not displayed.     Most Recent 2 LFT's:  Recent Labs   Lab Test 06/06/25  0850 06/04/25  0611 06/01/25  0406   AST  --  21 17   ALT  --  20 30   ALKPHOS  --  137 137   BILITOTAL 0.4 0.2 0.2     Most Recent Hemoglobin A1c:  Recent Labs   Lab Test 05/29/25  1515   A1C 6.3*     Most Recent ESR & CRP:  Recent Labs   Lab Test 06/08/25  0447 04/28/25  1213 04/15/25  0613 06/13/23  1820 08/26/19  2331   SED  --   --  61*  --  78*   CRP  --   --   --   --  180.0*   CRPI 34.00*   < >  --    < >  --     < > = values in this interval not displayed.     Most Recent Anemia Panel:  Recent Labs   Lab Test 06/12/25  0550 04/14/25  1410 04/07/25  0945 09/04/24  1008 06/15/23  1028   WBC 8.4   < > 6.5   < > 7.6   HGB 7.6*   < > 9.1*   < > 12.1   HCT 24.3*   < > 30.0*   < > 38.4   MCV 91   < > 88   < > 92      < > 351   < > 345   IRON  --   --  36*   < >  --    IRONSAT  --   --  13*   < >  --     FEB  --   --  277   < >  --    LAURA  --   --  44   < >  --    B12  --   --   --   --  456    < > = values in this interval not displayed.       ASSESSMENT/PLAN:  Diverticulitis complicated by abscess status post open sigmoidectomy with end colostomy 5/30/25  History of Sepsis due to Klebsiella pneumoniae and Pseudomonas aeruginosa bacteremia 5/28, likely of intra-abdominal origin   Hypotension due to sepsis  Comment: Long multiple hospitalization course. She required a slow steroid taper because of blood pressure dysregulations. We held her home antihypertensives while titrating steroids. We transitioned from meropenem to ceftolozane-tazobactam due to resistance (psuedomonas coverage) and switched vancomycin to linezolid in the setting of acute kidney injury for coverage of previous vancomycin-resistent enterococci (E Facecium). From infectious disease's perspective, there were no good oral antibiotic options and so she completed a 7-day IV antibiotic course at the hospital. There was new cloudy PATY drain output on 6/6 and ongoing leukocytosis but no new localizing symptoms so we continued to monitor and found. She had been seen in colorectal surgery clinic the day prior (5/27) where it was decided that she should undergo open sigmoidectomy with DLI. However, given her acute onset symptoms, we think she may need surgery sooner than anticipated. She is currently hemodynamically stable with a benign abdominal exam which doesn't warrant any sort of emergent intervention. Additionally, she has a complex past medical history so further optimization is required.   Plan:  -Follow up with ID as directed. Scheduled for 7/2/25  -Follow up with Colorectal Surgery as directed. Scheduled for 6/24 and 7/15/25  -High dose Vitamin A x 30 days post-op (through 6/30)  -CMP and CBC due 6/16/25--results pending  -BMP and CBC due 6/23/25  -Continue colostomy care as directed:  *Peristomal wound: With pouch changes - clean open wound to  "medial side of stoma with wound spray. Cut piece of Hydrofera Blue Classic to size of open area. Wet Hydrofera Blue (#[id number]) with saline and squeeze out excess, place in wound base, apply small bead of ostomy paste to the medial edge of hydrafera blue/Pt's skin to help hold in/seal hydrafera blue, then cover with additional ostomy ring to secure, apply wafer and caulk open creases around barrier ring, then attach pouch   *LLQ Colostomy pouching plan:   Pouching system: ostomy supplies pouches: Marina 57 FECAL ([id number]) ostomy supplies barrier: [place] 57mm SOFT CONVEX ([id number])  Accessories used: [facility] ostomy accessories: 2\" Cera Barrier Ring ([id number]) and Cavilon no sting barrier film ([id number])   Frequency of pouch changes: Twice weekly and PRN leakage  Bedside RN interventions: Change pouch PRN if leaking using the supplies above, Empty pouch when 1/3 to 1/2 full, ensure to clean pouch outlet after emptying to prevent odor, Notify [facility] for ongoing pouch leakage, Document stoma appearance and output volume, color, and consistency every shift, Encourage patient to empty pouch with assist, and Assist patient to measure and record output      Liver transplant in 2002 2/2 primary biliary cirrhosis  EBV +  Reactive Leukocytosis   Sjogren's syndrome  Comment: Chronic. Follows Dr. Ramirez. Transplant Hepatology was consulted and advised against sirolimus. PTA prednisone 9mg was resumed after weaning off IV hydrocortisone   Plan:  -Continue Tacrolimus Oral suspension 2mg/2mL BID. Must be suspension given unable to tolerate capsule due to allergy to filler--to be filled by [facility] pharmacy  -Continue prednisone 9mg daily for now. (5mg with 4 tablets of 1mg=9mg total)  -Continue PTA ursodiol 250 mg BID  -Follow up with transplant team as directed. Scheduled for 8/22/25  -Follow up with nephrology as directed. Scheduled for 7/23/25  -CMP and CBC due 6/16/25--results pending  -BMP and CBC due 6/23/25     Depression "   Protein calorie deficit malnutrition    Comment: Chronic. Appetite fair. She is worried about some weight loss, however per PCC her weights have been stable around 180# since April 2025.  Plan:  -Monitor mood and behaviors  -Dietician to remain involved to assist with supplemental needs  -Start chocolate glucerna BID with meals for now--dietician to adjust accordingly  -Monitor for worsening s/symptoms of concerns  -Monitor for changes in mobility, eating and sleeping patterns  -Continue zoloft 50mg daily.   -ACP while on TCU  -CMP and CBC due 6/16/25--results pending  -BMP and CBC due 6/23/25     Mild Obstructive Sleep Apnea   Nocturnal Hypoxemia   Pleural effusions  SOB  Comment: Chronic. Sleep study in 2018 showing mild RASHIDA with recommendation to start CPAP. It does not appear that there was further follow up and not using CPAP. See results of previous CT scan as indicated above.   Plan:  -Monitor sleeping patterns  -Continue albuterol inhaler every 4 hours PRN. May HUNTER  -Monitor respiratory status  -PCP to consider sleep medicine referral  -CMP and CBC due 6/16/25--results pending  -BMP and CBC due 6/23/25     CKD3b w/ moderate proteinuria, at baseline  HTN  CVD/HLD  paroxysmal atrial fibrillation  Comment: Chronic. Baseline Cr. 1.5. History of dialysis at time of liver transplant 23 years ago. Recurrent AKIs and immunosuppressive medication toxicity. Not on SGLT2i 2/2 recurrent UTI. Chart review w/ unclear heart failure history, unconfirmed with EF 60-65% on 3/10/25. BNP 1446 elevated from 1 month ago. Given unclear HF history, trace bilateral LE edema, with some pulm edema noted on imaging, consideration for volume overload. However, without orthopnea or clear cough.   Plan:  -Continue WITHOUT antihypertensives at this time (stopped Amlodipine, hydralazine, metoprolol were stopped in hospital)  -Monitor BP and HR  -Continue PTA evolocumab q2week.   -Continue PTA eztimibe 10mg every day  -Continue apixaban  2.5mg BID  -Follow up with cardiology as directed. Scheduled for 6/17/25  -CMP and CBC due 6/16/25--results pending  -BMP and CBC due 6/23/25     Concern for transient ischemic attack  Comment: Acute. In the morning of 6/4, she stated that she had speech problems in the last couple of days since the surgery. She described that she has trouble getting her words out and that her speech did not feel as fluent as it was previously.  She denied any new numbness, tingling, headache or changes in vision. CT head was negative for intracranial bleed or other acute process and TTE with bubble study was also negative. It is possible that she may have had a TIA following surgery as anticoagulation had been held. MRI brain was negative for acute ischemic changes and her speech has returned to baseline.   Plan:  -Continue PTA Xarelto   -No need for neuro follow-up   -CMP and CBC due 6/16/25--results pending  -BMP and CBC due 6/23/25     T2DM  (A1c 6.8% 4/14/25)  Comment: Chronic. Last A1c 6.8% on 4/14/25.   Plan:  -Continue PTA linagliptin 5mg every day  -Continue PTA gabapentin 300mg at bedtime  -Blood Glucose monitoring back to previous schedule of BID. HOLD off on using freestyle phil 2 sensor while on TCU  -CMP and CBC due 6/16/25--results pending  -BMP and CBC due 6/23/25  -Obtain hgb A1c as directed. Due July 2025     Acute kidney injury on CKD3, resolved   Bilateral stent placement per urology 5/29  Acute tubular necrosis  Comment: Her baseline creatinine is 1.3-1.6. She had an DERREK in the setting of hypotension, shock, and nephrotoxic meds which resolved after receiving IV fluids and improved PO after surgery.   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 Wound does not look infected. She now  report having skin lesion to left anterior forearm. 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 Will defer to dermatology  Plan:  -Monitor for worsening s/symptoms of concerns  -Follow up with dermatology      Acute blood loss anemia   Anemia of critical illness  coagulopathy due to cirrhosis and surgical blood loss    thrombocytopenia   Comment: Chronic. Per recent hospitalization-Did not find any signs of overt bleeding and her ostomy output and vitals were stable. Continued her ferous sulfate and folic acid inpatient. She received 1 unit of packed red blood cells on 6/7. Stable at the time of discharge.   Plan:  -Monitor bleeding risks  -Continue PTA ferrous sulfate daily  -Continue folic acid daily  -CMP and CBC due 6/16/25--results pending  -BMP and CBC due 6/23/25     Hypothyroidism  Papillary thyroid carcinoma  Chronic. Recent TSH~1.72 on 4/4/25. S/p thyroidectomy 2010  Plan;  -Continue PTA levothyroxine 175mcg qD   -Monitor for worsening s/symptoms of concerns           TSH   Date Value Ref Range Status   04/04/2025 1.72 0.30 - 4.20 uIU/mL Final   05/20/2019 12.11 (H) 0.40 - 4.00 mU/L Final      Chronic Shoulder Pain  Osteoarthritis   Lower back pain  Comment: Chronic. stable  Plan:  -Monitor pain complaints  -Continue diclofenac gel application to painful areas QID  -Continue Tylenol 1000mg BID   -Continue gabapentin 300mg at HS--only able to tolerate liquid given gelatin capsule allergy     Constipation   Nausea  GERD  Comment: Chronic. S/T polypharmacy.   Plan:  -Monitor BM patterns  -Continue TUMS 1000mg every 4 hours PRN  -Continue beneFiber qd    -Zofran PRN     Epistaxis  Comment: Acute on chronic. She reports occasional nose bleed at times. No episodes while on TCU.   Plan:  -Monitor bleeding risks  -Monitor for worsening s/symptoms of concerns  -Afrin BID PRN on TCU if warranted     Vaginal bleeding  Comment: Acute on chronic. Referred to OBGYN. Last menstrual period she  reports has been 40 years ago or so. Known family history of endometrial cancer that metastasized to bowel per her reports. She reports her mother had extensive genetic testing for review in EPIC. Reports mother name: Anne Yap  3/9/23 for reference if indicated. Per OBGYN on 25:  If the endometrial stripe is greater than 4 mm or if it is less than 4 mm and she continues to have bleeding, she will need tissue evaluation with either office endometrial biopsy or hysteroscopy with D&C in the operating room. She does not believe she can tolerate an endometrial biopsy in the clinic, so we discussed doing this as an outpatient procedure.  Plan:  -Monitor for worsening s/symptoms of concerns  -Follow up with OBGYN as directed  -CMP and CBC due 25--results pending  -BMP and CBC due 25     Physical deconditioning  Generalized muscle weakness  History of falls  Comment: Acute on chronic. Known poor history PTA. [facility] to [facility] given concerns.   Plan:  -Continue Physical therapy and Occupational therapy  -Recommend cognitive testing on site  -Highly recommend MIKEY compliance post TCU    Electronically signed by:  Dr. Manda Flor DNP, APRN, FNP-C, WCS-C, EDS-C     Provider reviewed records from facility, and interpreted most recent imaging/lab work, and vital signs.   Acute and chronic conditions managed by writer. Have been reviewed during today's exam      Sincerely,        Manda Flor, LIZ CNP    Electronically signed

## 2025-06-16 NOTE — TELEPHONE ENCOUNTER
Prior Authorization Approval    Medication: TACROLIMUS (GENERIC) 1 MG/ML ORAL SUSP CNR  Authorization Effective Date: 6/13/2025  Authorization Expiration Date: 12/31/2025  Approved Dose/Quantity: 120/30  Reference #:     Insurance Company: [employer] Part D - Phone [phone] Fax [phone]  Expected CoPay: $ 0  CoPay Card Available: No    Financial Assistance Needed: N/A  Which Pharmacy is filling the prescription: [facility] - [place], [place] - [facility] [address]   Pharmacy Notified: Yes  Patient Notified: Yes      Thank you,  Michelle Wyman Oncology/Transplant Liaison  Phone: [phone]  Fax: [phone]

## 2025-06-17 ENCOUNTER — TELEPHONE (OUTPATIENT)
Dept: CARDIOLOGY | Facility: CLINIC | Age: 75
End: 2025-06-17

## 2025-06-17 ENCOUNTER — ANCILLARY PROCEDURE (OUTPATIENT)
Dept: CARDIOLOGY | Facility: CLINIC | Age: 75
End: 2025-06-17
Attending: INTERNAL MEDICINE
Payer: MEDICARE

## 2025-06-17 DIAGNOSIS — I48.0 PAROXYSMAL ATRIAL FIBRILLATION (H): ICD-10-CM

## 2025-06-17 DIAGNOSIS — Z45.09 ENCOUNTER FOR LOOP RECORDER CHECK: ICD-10-CM

## 2025-06-17 DIAGNOSIS — I48.0 PAROXYSMAL ATRIAL FIBRILLATION (H): Primary | ICD-10-CM

## 2025-06-17 PROCEDURE — 93298 REM INTERROG DEV EVAL SCRMS: CPT | Performed by: INTERNAL MEDICINE

## 2025-06-17 NOTE — PROGRESS NOTES
[facility] GERIATRICS    Chief Complaint   Patient presents with    RECHECK     HPI:  Luz Thompson is a 75 year old  (1950), who is being seen today for an episodic care visit at: [facility] ([facility])([facility]) [[id number]]. Today's concern is: The primary encounter diagnosis was Diverticulitis of colon. Diagnoses of Intra-abdominal infection, Hx of sepsis, Hypotension, unspecified hypotension type, S/P laparoscopic-assisted sigmoidectomy, Colostomy present (H), Physical deconditioning, Generalized muscle weakness, Chronic shoulder pain, unspecified laterality, Age-related osteoporosis without current pathological fracture, Bilateral low back pain without sciatica, unspecified chronicity, Chronic anemia, Stage 3b chronic kidney disease (H), S/P Mohs surgery for basal cell carcinoma, 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, Hypothyroidism, unspecified type, Hypertension goal BP (blood pressure) < 140/80, Nocturnal hypoxemia, Hyperlipidemia LDL goal <70, Chronic diastolic heart failure (H), RASHIDA (obstructive sleep apnea), Status post liver transplantation (H), Type 2 diabetes mellitus with stage 3b chronic kidney disease, without long-term current use of insulin (H), PAF (paroxysmal atrial fibrillation) (H), Depression, unspecified depression type, Leukocytosis, unspecified type, Sjogren's syndrome, with unspecified organ involvement, Protein malnutrition, Immunocompromised, Seborrheic dermatitis, Papillary thyroid carcinoma (H), Vaginal bleeding, and Epistaxis were also pertinent to this visit.    Met with patient who was found lying in bed eating her breakfast. She denies any chest pain, palpitations, shortness of breath, [place], lightheadedness, dizziness, or cough. Noted oxygen levels hovering around upper 80s-low 90%. She is aware of cardiology reports of her loop recorder indicating that she has been in active a fib for the past 2 weeks. She reports today of feeling no  "complaints. She denies any abdominal discomfort. Denies N&V. Denies dysuria or frequency. No vaginal bleeding reported. Stools have been soft and brown noted in colostomy pouch to LLQ. Appetite good. Sleeping well. Denies any pain complaints. Mood stable.     Noted upon shift change around 1600 that oxygen levels have been trending in mid 80s-low 90% on RA. Oxygen was applied along with CXR orders given.     BP Readings from Last 3 Encounters:   06/18/25 107/68   06/16/25 116/66   06/12/25 121/70     Wt Readings from Last 5 Encounters:   06/18/25 84.2 kg (185 lb 9.6 oz)   06/16/25 82.5 kg (181 lb 12.8 oz)   06/12/25 85.6 kg (188 lb 11.2 oz)   06/11/25 87 kg (191 lb 12.8 oz)   05/27/25 82.7 kg (182 lb 6.4 oz)     Allergies, and PMH/PSH reviewed in EPIC today.  REVIEW OF SYSTEMS:  4 point ROS including Respiratory, CV, GI and , other than that noted in the HPI,  is negative    Objective:   /68   Pulse 105   Temp 98.8  F (37.1  C)   Resp 20   Ht 1.737 m (5' 8.4\")   Wt 84.2 kg (185 lb 9.6 oz)   LMP 06/01/1988 (Approximate)   SpO2 (!) 90%   BMI 27.89 kg/m    GENERAL APPEARANCE:  Alert, in no distress, oriented, cooperative  ENT:  Mouth and posterior oropharynx normal, moist mucous membranes, [place]  EYES:  EOM, conjunctivae, lids, pupils and irises normal  NECK:  No adenopathy,masses or thyromegaly  RESP:  respiratory effort and palpation of chest normal, lungs clear to auscultation , no respiratory distress  CV:  regular rate and rhythm, no murmur, rub, or gallop, no edema, +2 pedal pulses  ABDOMEN:  normal bowel sounds, soft, nontender, no hepatosplenomegaly or other masses, no guarding or rebound  M/S:   Angel lift for all transfers. Wheelchair bound. Staff to assist for ADLs, transfers and cares  SKIN:  Inspection of skin and subcutaneous tissue baseline, wound healing well, no signs of infection staples present to mid abdomen  NEURO:   Cranial nerves 2-12 are normal tested and grossly at patient's " baseline, no purposeful movement in upper and lower extremities  PSYCH:  oriented X 3, memory impaired , affect and mood normal    Most Recent 3 CBC's:  Recent Labs   Lab Test 06/16/25  1038 06/12/25  0550 06/11/25  0445   WBC 8.8 8.4 10.2   HGB 7.5* 7.6* 7.5*   MCV 95 91 91    230 254     Most Recent 3 BMP's:  Recent Labs   Lab Test 06/16/25  1038 06/12/25  0734 06/12/25  0550 06/11/25  0723 06/11/25  0445     --  141  --  141   POTASSIUM 4.9  --  4.2  --  4.4   CHLORIDE 105  --  107  --  108*   CO2 22  --  25  --  21*   BUN 33.5*  --  45.1*  --  46.7*   CR 1.74*  --  1.62*  --  1.55*   ANIONGAP 12  --  9  --  12   KEILY 8.4*  --  8.4*  --  8.4*   GLC 93 95 107*   < > 182*    < > = values in this interval not displayed.     Most Recent 2 LFT's:  Recent Labs   Lab Test 06/16/25  1038 06/06/25  0850 06/04/25  0611   AST 22  --  21   ALT 18  --  20   ALKPHOS 93  --  137   BILITOTAL <0.2 0.4 0.2     Most Recent Hemoglobin A1c:  Recent Labs   Lab Test 05/29/25  1515   A1C 6.3*     Most Recent Anemia Panel:  Recent Labs   Lab Test 06/16/25  1038 04/14/25  1410 04/07/25  0945 09/04/24  1008 06/15/23  1028   WBC 8.8   < > 6.5   < > 7.6   HGB 7.5*   < > 9.1*   < > 12.1   HCT 24.8*   < > 30.0*   < > 38.4   MCV 95   < > 88   < > 92      < > 351   < > 345   IRON  --   --  36*   < >  --    IRONSAT  --   --  13*   < >  --    FEB  --   --  277   < >  --    LAURA  --   --  44   < >  --    B12  --   --   --   --  456    < > = values in this interval not displayed.     Magnesium   Date Value Ref Range Status   06/12/2025 1.9 1.7 - 2.3 mg/dL Final   08/05/2019 2.2 1.6 - 2.3 mg/dL Final     Vitamin D Deficiency Screening Results:  Lab Results   Component Value Date    VITDT 30 04/04/2025    VITDT 15 (L) 12/22/2012    VITDT 27 (L) 09/27/2012     ASSESSMENT/PLAN:  Diverticulitis complicated by abscess status post open sigmoidectomy with end colostomy 5/30/25  History of Sepsis due to Klebsiella pneumoniae and Pseudomonas  aeruginosa bacteremia 5/28, likely of intra-abdominal origin   Hypotension due to sepsis  Comment: Long multiple hospitalization course. She required a slow steroid taper because of blood pressure dysregulations. We held her home antihypertensives while titrating steroids. We transitioned from meropenem to ceftolozane-tazobactam due to resistance (psuedomonas coverage) and switched vancomycin to linezolid in the setting of acute kidney injury for coverage of previous vancomycin-resistent enterococci (E Facecium). From infectious disease's perspective, there were no good oral antibiotic options and so she completed a 7-day IV antibiotic course at the hospital. There was new cloudy PATY drain output on 6/6 and ongoing leukocytosis but no new localizing symptoms so we continued to monitor and found. She had been seen in colorectal surgery clinic the day prior (5/27) where it was decided that she should undergo open sigmoidectomy with DLI. However, given her acute onset symptoms, we think she may need surgery sooner than anticipated. She is currently hemodynamically stable with a benign abdominal exam which doesn't warrant any sort of emergent intervention. Additionally, she has a complex past medical history so further optimization is required.   Plan:  -Follow up with ID as directed. Scheduled for 7/2/25  -Follow up with Colorectal Surgery as directed. Scheduled for 6/24 and 7/15/25  -High dose Vitamin A x 30 days post-op (through 6/30)  -Change nutrisource supply to personal benefiber packet daily per request  -BMP, CBC, CRP, procalcitonin, and lactic acid level due 6/20/25  -Continue colostomy care as directed:  *Peristomal wound: With pouch changes - clean open wound to medial side of stoma with wound spray. Cut piece of Hydrofera Blue Classic to size of open area. Wet Hydrofera Blue (#[id number]) with saline and squeeze out excess, place in wound base, apply small bead of ostomy paste to the medial edge of hydrafera  "blue/Pt's skin to help hold in/seal hydrafera blue, then cover with additional ostomy ring to secure, apply wafer and caulk open creases around barrier ring, then attach pouch   *LLQ Colostomy pouching plan:   Pouching system: ostomy supplies pouches: Marina 57 FECAL ([id number]) ostomy supplies barrier: Greenleaf 57mm SOFT CONVEX ([id number])  Accessories used: [facility] ostomy accessories: 2\" Cera Barrier Ring ([id number]) and Cavilon no sting barrier film ([id number])   Frequency of pouch changes: Twice weekly and PRN leakage  Bedside RN interventions: Change pouch PRN if leaking using the supplies above, Empty pouch when 1/3 to 1/2 full, ensure to clean pouch outlet after emptying to prevent odor, Notify WOC for ongoing pouch leakage, Document stoma appearance and output volume, color, and consistency every shift, Encourage patient to empty pouch with assist, and Assist patient to measure and record output      Liver transplant in 2002 2/2 primary biliary cirrhosis  EBV +  Reactive Leukocytosis   Sjogren's syndrome  Comment: Chronic. Follows Dr. Ramirez. Transplant Hepatology was consulted and advised against sirolimus. PTA prednisone 9mg was resumed after weaning off IV hydrocortisone   Plan:  -Continue Tacrolimus Oral suspension 2mg/2mL BID. Must be suspension given unable to tolerate capsule due to allergy to filler--to be filled by [facility]  -Continue prednisone 9mg daily for now. (5mg with 4 tablets of 1mg=9mg total)  -Continue PTA ursodiol 250 mg BID  -Follow up with transplant team as directed. Scheduled for 8/22/25  -Follow up with nephrology as directed. Scheduled for 7/23/25  -BMP, CBC, CRP, procalcitonin, and lactic acid level due 6/20/25     Depression   Protein calorie deficit malnutrition    Comment: Chronic. Appetite fair. She is worried about some weight loss, however per PCC her weights have been stable around 180# since April 2025. [facility] 26/30  Plan:  -Monitor mood and behaviors  -Dietician to " remain involved to assist with supplemental needs  -Continue chocolate glucerna BID with meals for now--dietician to adjust accordingly  -Monitor for worsening s/symptoms of concerns  -Monitor for changes in mobility, eating and sleeping patterns  -Continue zoloft 50mg daily.   -ACP while on TCU  -BMP, CBC, CRP, procalcitonin, and lactic acid level due 6/20/25     Mild Obstructive Sleep Apnea   Nocturnal Hypoxemia   Pleural effusions  Shortness of breath  Acute respiratory failure with hypoxia  Comment: Chronic. Sleep study in 2018 showing mild RASHIDA with recommendation to start CPAP. It does not appear that there was further follow up and not using CPAP. Noted oxygen levels hovering around upper 80s-low 90%. She denies any shortness of breath complaints  Plan:  -Monitor sleeping patterns  -CXR to rule out acute concerns  -Start oxygen 1-2L via NC to keep sats >88%. Wean as tolerated.   -Continue albuterol inhaler every 4 hours PRN. May HUNTER  -Monitor respiratory status  -PCP to consider sleep medicine referral  -BMP, CBC, CRP, procalcitonin, and lactic acid level due 6/20/25     CKD3b w/ moderate proteinuria, at baseline  HTN  CVD/HLD  paroxysmal atrial fibrillation  Comment: Chronic. Baseline Cr. 1.5. History of dialysis at time of liver transplant 23 years ago. Recurrent AKIs and immunosuppressive medication toxicity. Not on SGLT2i 2/2 recurrent UTI. Chart review w/ unclear heart failure history, unconfirmed with EF 60-65% on 3/10/25. BNP 1446 elevated from 1 month ago. Given unclear HF history, trace bilateral LE edema, with some pulm edema noted on imaging, consideration for volume overload. However, without orthopnea or clear cough. Per cardiology on 6/17/25 Loop recording: Patient has been in persistent AF since 6/2/25. She has a history of pAF but this is the first persistent episode logged. She was hospitalized from 5/28-6/12 with septic shock secondary to diverticulitis complicated by abscess which required  sigmoidectomy and colostomy. She states that during her hospitalization her telemetry frequently alerted for HRs in the 40's. Her loop recorder is set to alert for HRs < 40bpm... we did an alert for 52 zhou episodes with most recent logged on 6/3/25... ECGs show SB in the mid to upper-30's with longest episode exceeding 1hr. She is scheduled to establish cares with Dr. Morgan in August.   Plan:  -Continue WITHOUT antihypertensives at this time (stopped Amlodipine, hydralazine, metoprolol were stopped in hospital)  -Monitor BP and HR  -Continue PTA evolocumab q2week.   -Continue PTA eztimibe 10mg every day  -Continue apixaban 2.5mg BID  -Follow up with cardiology as directed.   -BMP, CBC, CRP, procalcitonin, and lactic acid level due 6/20/25     Concern for transient ischemic attack  Comment: Acute. In the morning of 6/4, she stated that she had speech problems in the last couple of days since the surgery. She described that she has trouble getting her words out and that her speech did not feel as fluent as it was previously.  She denied any new numbness, tingling, headache or changes in vision. CT head was negative for intracranial bleed or other acute process and TTE with bubble study was also negative. It is possible that she may have had a TIA following surgery as anticoagulation had been held. MRI brain was negative for acute ischemic changes and her speech has returned to baseline.   Plan:  -Continue PTA Xarelto   -No need for neuro follow-up   -BMP, CBC, CRP, procalcitonin, and lactic acid level due 6/20/25     T2DM  (A1c 6.8% 4/14/25)  Comment: Chronic. Last A1c 6.8% on 4/14/25.   Plan:  -Continue PTA linagliptin 5mg every day  -Continue PTA gabapentin 300mg at bedtime  -Blood Glucose monitoring back to previous schedule of BID. HOLD off on using freestyle phil 2 sensor while on TCU  -BMP, CBC, CRP, procalcitonin, and lactic acid level due 6/20/25  -Obtain hgb A1c as directed. Due July 2025     Acute  kidney injury on CKD3, resolved   Bilateral stent placement per urology 5/29  Acute tubular necrosis  Comment: Her baseline creatinine is 1.3-1.6. She had an DERREK in the setting of hypotension, shock, and nephrotoxic meds which resolved after receiving IV fluids and improved PO after surgery.   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 Wound does not look infected. She now report having skin lesion to left anterior forearm. 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 Will defer to dermatology  Plan:  -Monitor for worsening s/symptoms of concerns  -Follow up with dermatology      Acute blood loss anemia   Anemia of critical illness  coagulopathy due to cirrhosis and surgical blood loss    thrombocytopenia   Comment: Chronic. Per recent hospitalization-Did not find any signs of overt bleeding and her ostomy output and vitals were stable. Continued her ferous sulfate and folic acid inpatient. She received 1 unit of packed red blood cells on 6/7. Stable at the time of discharge. Now average around mid 7  Plan:  -Monitor bleeding risks  -Continue PTA ferrous sulfate daily  -Recommend transfusion if hgb <7, low threshold to send to ED for transfusions needs if indicated  -Continue folic acid daily  -BMP, CBC, CRP, procalcitonin, and lactic acid level due 6/20/25     Hypothyroidism  Papillary thyroid carcinoma  Chronic. Recent TSH~1.72 on 4/4/25. S/p thyroidectomy 2010  Plan;  -Continue PTA levothyroxine 175mcg qD   -Monitor for worsening s/symptoms of concerns  -Repeat thyroid panel in July 2025     Chronic Shoulder Pain  Osteoarthritis   Lower back pain  Comment: Chronic. stable  Plan:  -Monitor pain complaints  -Continue diclofenac gel application to painful areas  QID  -Continue Tylenol 1000mg BID   -Continue gabapentin 300mg at HS--only able to tolerate liquid given gelatin capsule allergy     Constipation   Nausea  GERD  Comment: Chronic. S/T polypharmacy.   Plan:  -Monitor BM patterns  -Continue TUMS 1000mg every 4 hours PRN  -Continue beneFiber qd    -Zofran PRN     Epistaxis  Comment: Acute on chronic. She reports occasional nose bleed at times. No episodes while on TCU.   Plan:  -Monitor bleeding risks  -Monitor for worsening s/symptoms of concerns  -Afrin BID PRN on TCU if warranted     Vaginal bleeding  Comment: Acute on chronic. Referred to OBGYN. Last menstrual period she reports has been 40 years ago or so. Known family history of endometrial cancer that metastasized to bowel per her reports. She reports her mother had extensive genetic testing for review in EPIC. Reports mother name: Anne Yap  3/9/23 for reference if indicated. Per OBGYN on 25:  If the endometrial stripe is greater than 4 mm or if it is less than 4 mm and she continues to have bleeding, she will need tissue evaluation with either office endometrial biopsy or hysteroscopy with D&C in the operating room. She does not believe she can tolerate an endometrial biopsy in the clinic, so we discussed doing this as an outpatient procedure.  Plan:  -Monitor for worsening s/symptoms of concerns  -Follow up with OBGYN as directed  -BMP, CBC, CRP, procalcitonin, and lactic acid level due 25     Physical deconditioning  Generalized muscle weakness  History of falls  Comment: Acute on chronic. Known poor history PTA. [facility] given concerns. Per therapy-Patient requires MOdA for bed and UB needs. Maximum assistance for LB needs and toileting. Angel lift for all transfers given weakness. Increased anxiety and fear with transfers since TCU return.   Plan:  -Continue Physical therapy and Occupational therapy  -Recommend cognitive testing on site  -Highly recommend  group home compliance post TCU     Electronically signed by:  Dr. Manda Flor DNP, APRN, FNP-C, WCS-C, EDS-C     Provider reviewed records from facility, and interpreted most recent imaging/lab work, and vital signs.   Acute and chronic conditions managed by writer. Have been reviewed during today's exam

## 2025-06-17 NOTE — TELEPHONE ENCOUNTER
"Alert received from patient's ILR for 3 tachy, 52 zhou, and 30 AF episodes.    AF: Upon review, patient has been in persistent AF since 6/2/25. ECGs available for AF episodes all confirm AF with V rates in the 's per histogram. Presenting ECG logged 6/17/25 @ 0140 confirms patient remained in AF at that time.    Tachy: 3 episodes logged lasting 1m01s to 3m13s. ECGs show likely RVR in the 170-190's with noise    Zhou: 52 episodes logged lasting 29s to 1h05m, most recent episode occurred on 6/3/25. 5 ECGs for review show SB in the 30's.    Patient has a known history of symptomatic pAF, takes eliquis and pill-in-the-pocket metoprolol for breakthrough episodes.    I spoke with patient. She has not had any symptoms associated with the AF but states \"I've had better days.\" Patient states that she had a \"growth in her stomach\" with plans for outpatient surgery in 2 weeks but it became emergent and she was rushed to the hospital about 2 weeks ago. She had emergent surgery and ended up with a stoma. The hospital did inform her that she was in AF during her stay but she thought that she was going in-and-out so was surprised to hear that she has been in it persistently. She states that she was frequently alerting for HRs in the 40's during her stay which made the hospital \"quite anxious.\" She is wondering if the loop recorder picked up on any of these episodes... I informed her that the loop recorder is set to alert us to any HRs <40bpm so would not alert us to HRs in the 40's. She is aware that her device did alert to some HRs <40 at the beginning of this month.  She states that she is back on her eliquis but believes that it was held for a time while she was hospitalized.    She is aware that an update will be routed to one of Dr. Zuluaga's providers for review and recommendation.     [place] has not followed with any providers other than Dr. Zuluaga.. Will route an update to Dr. Alvares.   She is scheduled to " establish with Dr. Morgan in 8/2025.    TY LAZCANO

## 2025-06-18 ENCOUNTER — ANCILLARY PROCEDURE (OUTPATIENT)
Dept: GENERAL RADIOLOGY | Facility: CLINIC | Age: 75
End: 2025-06-18
Attending: NURSE PRACTITIONER

## 2025-06-18 ENCOUNTER — TRANSITIONAL CARE UNIT VISIT (OUTPATIENT)
Dept: GERIATRICS | Facility: CLINIC | Age: 75
End: 2025-06-18
Payer: MEDICARE

## 2025-06-18 VITALS
HEART RATE: 105 BPM | WEIGHT: 185.6 LBS | TEMPERATURE: 98.8 F | SYSTOLIC BLOOD PRESSURE: 107 MMHG | DIASTOLIC BLOOD PRESSURE: 68 MMHG | BODY MASS INDEX: 28.13 KG/M2 | HEIGHT: 68 IN | RESPIRATION RATE: 20 BRPM | OXYGEN SATURATION: 90 %

## 2025-06-18 DIAGNOSIS — Z86.19 HX OF SEPSIS: ICD-10-CM

## 2025-06-18 DIAGNOSIS — N18.32 STAGE 3B CHRONIC KIDNEY DISEASE (H): ICD-10-CM

## 2025-06-18 DIAGNOSIS — G47.34 NOCTURNAL HYPOXEMIA: ICD-10-CM

## 2025-06-18 DIAGNOSIS — M54.50 BILATERAL LOW BACK PAIN WITHOUT SCIATICA, UNSPECIFIED CHRONICITY: ICD-10-CM

## 2025-06-18 DIAGNOSIS — M81.0 AGE-RELATED OSTEOPOROSIS WITHOUT CURRENT PATHOLOGICAL FRACTURE: ICD-10-CM

## 2025-06-18 DIAGNOSIS — G89.29 CHRONIC SHOULDER PAIN, UNSPECIFIED LATERALITY: ICD-10-CM

## 2025-06-18 DIAGNOSIS — N18.32 TYPE 2 DIABETES MELLITUS WITH STAGE 3B CHRONIC KIDNEY DISEASE, WITHOUT LONG-TERM CURRENT USE OF INSULIN (H): ICD-10-CM

## 2025-06-18 DIAGNOSIS — Z98.890 S/P MOHS SURGERY FOR BASAL CELL CARCINOMA: ICD-10-CM

## 2025-06-18 DIAGNOSIS — Z93.3 COLOSTOMY PRESENT (H): ICD-10-CM

## 2025-06-18 DIAGNOSIS — E46 PROTEIN MALNUTRITION: ICD-10-CM

## 2025-06-18 DIAGNOSIS — F32.A DEPRESSION, UNSPECIFIED DEPRESSION TYPE: ICD-10-CM

## 2025-06-18 DIAGNOSIS — G47.33 OSA (OBSTRUCTIVE SLEEP APNEA): ICD-10-CM

## 2025-06-18 DIAGNOSIS — D72.829 LEUKOCYTOSIS, UNSPECIFIED TYPE: ICD-10-CM

## 2025-06-18 DIAGNOSIS — R04.0 EPISTAXIS: ICD-10-CM

## 2025-06-18 DIAGNOSIS — M62.81 GENERALIZED MUSCLE WEAKNESS: ICD-10-CM

## 2025-06-18 DIAGNOSIS — R06.02 SOB (SHORTNESS OF BREATH): ICD-10-CM

## 2025-06-18 DIAGNOSIS — M25.519 CHRONIC SHOULDER PAIN, UNSPECIFIED LATERALITY: ICD-10-CM

## 2025-06-18 DIAGNOSIS — I10 HYPERTENSION GOAL BP (BLOOD PRESSURE) < 140/80: ICD-10-CM

## 2025-06-18 DIAGNOSIS — B99.9 INTRA-ABDOMINAL INFECTION: ICD-10-CM

## 2025-06-18 DIAGNOSIS — E11.22 TYPE 2 DIABETES MELLITUS WITH STAGE 3B CHRONIC KIDNEY DISEASE, WITHOUT LONG-TERM CURRENT USE OF INSULIN (H): ICD-10-CM

## 2025-06-18 DIAGNOSIS — C73 PAPILLARY THYROID CARCINOMA (H): ICD-10-CM

## 2025-06-18 DIAGNOSIS — R09.02 HYPOXIA: ICD-10-CM

## 2025-06-18 DIAGNOSIS — I50.32 CHRONIC DIASTOLIC HEART FAILURE (H): ICD-10-CM

## 2025-06-18 DIAGNOSIS — R53.81 PHYSICAL DECONDITIONING: ICD-10-CM

## 2025-06-18 DIAGNOSIS — E03.9 HYPOTHYROIDISM, UNSPECIFIED TYPE: ICD-10-CM

## 2025-06-18 DIAGNOSIS — J96.01 ACUTE RESPIRATORY FAILURE WITH HYPOXIA (H): ICD-10-CM

## 2025-06-18 DIAGNOSIS — Z94.4 STATUS POST LIVER TRANSPLANTATION (H): ICD-10-CM

## 2025-06-18 DIAGNOSIS — I95.9 HYPOTENSION, UNSPECIFIED HYPOTENSION TYPE: ICD-10-CM

## 2025-06-18 DIAGNOSIS — N93.9 VAGINAL BLEEDING: ICD-10-CM

## 2025-06-18 DIAGNOSIS — E78.5 HYPERLIPIDEMIA LDL GOAL <70: ICD-10-CM

## 2025-06-18 DIAGNOSIS — Z85.828 S/P MOHS SURGERY FOR BASAL CELL CARCINOMA: ICD-10-CM

## 2025-06-18 DIAGNOSIS — I48.0 PAF (PAROXYSMAL ATRIAL FIBRILLATION) (H): ICD-10-CM

## 2025-06-18 DIAGNOSIS — L21.9 SEBORRHEIC DERMATITIS: ICD-10-CM

## 2025-06-18 DIAGNOSIS — C44.329 SQUAMOUS CELL CANCER OF SKIN OF LEFT CHEEK: ICD-10-CM

## 2025-06-18 DIAGNOSIS — D84.9 IMMUNOCOMPROMISED: ICD-10-CM

## 2025-06-18 DIAGNOSIS — J90 PLEURAL EFFUSION: ICD-10-CM

## 2025-06-18 DIAGNOSIS — M35.00 SJOGREN'S SYNDROME, WITH UNSPECIFIED ORGAN INVOLVEMENT: ICD-10-CM

## 2025-06-18 DIAGNOSIS — Z90.49 S/P LAPAROSCOPIC-ASSISTED SIGMOIDECTOMY: ICD-10-CM

## 2025-06-18 DIAGNOSIS — K57.32 DIVERTICULITIS OF COLON: Primary | ICD-10-CM

## 2025-06-18 DIAGNOSIS — D64.9 CHRONIC ANEMIA: ICD-10-CM

## 2025-06-18 PROCEDURE — 71045 X-RAY EXAM CHEST 1 VIEW: CPT

## 2025-06-18 PROCEDURE — 99309 SBSQ NF CARE MODERATE MDM 30: CPT | Performed by: NURSE PRACTITIONER

## 2025-06-18 NOTE — TELEPHONE ENCOUNTER
"\"Have her see DOMI. Thanks\"    Above message received from Dr. Esqueda.    I spoke with patient. She is aware of findings and need for first available follow up with DOMI to discuss management of these elevated HRs knowing that patient also has lower HRs at times in the 30-40bpm range. She states that transportation is too difficult for her with her new incisions (she felt like she was \"going to croak\" on the wheelchair transport back home). She is only willing to complete a video visit. She will have her home RN call to schedule an appt when she comes over next seeing as she is currently managing her schedule.    GG RN  "

## 2025-06-18 NOTE — TELEPHONE ENCOUNTER
Alert received for 3 additional AF episodes. Patient remains in persistent AF. 2 ECGs available for review lasted 18-42min and show median V rates in the 150's. Presenting EGM shows patient remains in AF w/ variable V rates.    Will route to Dr. Esqueda as Resource MD.  TY LAZCANO

## 2025-06-18 NOTE — LETTER
6/18/2025      Luz Thompson  [address]  [facility] [address]        [facility] GERIATRICS    Chief Complaint   Patient presents with     RECHECK     HPI:  Luz Thompson is a 75 year old  (1950), who is being seen today for an episodic care visit at: [facility] ([facility])([facility]) [[id number]]. Today's concern is: The primary encounter diagnosis was Diverticulitis of colon. Diagnoses of Intra-abdominal infection, Hx of sepsis, Hypotension, unspecified hypotension type, S/P laparoscopic-assisted sigmoidectomy, Colostomy present (H), Physical deconditioning, Generalized muscle weakness, Chronic shoulder pain, unspecified laterality, Age-related osteoporosis without current pathological fracture, Bilateral low back pain without sciatica, unspecified chronicity, Chronic anemia, Stage 3b chronic kidney disease (H), S/P Mohs surgery for basal cell carcinoma, 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, Hypothyroidism, unspecified type, Hypertension goal BP (blood pressure) < 140/80, Nocturnal hypoxemia, Hyperlipidemia LDL goal <70, Chronic diastolic heart failure (H), RASHIDA (obstructive sleep apnea), Status post liver transplantation (H), Type 2 diabetes mellitus with stage 3b chronic kidney disease, without long-term current use of insulin (H), PAF (paroxysmal atrial fibrillation) (H), Depression, unspecified depression type, Leukocytosis, unspecified type, Sjogren's syndrome, with unspecified organ involvement, Protein malnutrition, Immunocompromised, Seborrheic dermatitis, Papillary thyroid carcinoma (H), Vaginal bleeding, and Epistaxis were also pertinent to this visit.    Met with patient who was found lying in bed eating her breakfast. She denies any chest pain, palpitations, shortness of breath, [place], lightheadedness, dizziness, or cough. She is aware of cardiology reports of her loop recorder indicating that she has been in active a fib for the past 2 weeks. She  "reports today of feeling no complaints. She denies any abdominal discomfort. Denies N&V. Denies dysuria or frequency. No vaginal bleeding reported. Stools have been soft and brown noted in colostomy pouch to LLQ. Appetite good. Sleeping well. Denies any pain complaints. Mood stable.     BP Readings from Last 3 Encounters:   06/18/25 107/68   06/16/25 116/66   06/12/25 121/70     Wt Readings from Last 5 Encounters:   06/18/25 84.2 kg (185 lb 9.6 oz)   06/16/25 82.5 kg (181 lb 12.8 oz)   06/12/25 85.6 kg (188 lb 11.2 oz)   06/11/25 87 kg (191 lb 12.8 oz)   05/27/25 82.7 kg (182 lb 6.4 oz)     Allergies, and PMH/PSH reviewed in EPIC today.  REVIEW OF SYSTEMS:  4 point ROS including Respiratory, CV, GI and , other than that noted in the HPI,  is negative    Objective:   /68   Pulse 105   Temp 98.8  F (37.1  C)   Resp 20   Ht 1.737 m (5' 8.4\")   Wt 84.2 kg (185 lb 9.6 oz)   LMP 06/01/1988 (Approximate)   SpO2 (!) 90%   BMI 27.89 kg/m    GENERAL APPEARANCE:  Alert, in no distress, oriented, cooperative  ENT:  Mouth and posterior oropharynx normal, moist mucous membranes, [place]  EYES:  EOM, conjunctivae, lids, pupils and irises normal  NECK:  No adenopathy,masses or thyromegaly  RESP:  respiratory effort and palpation of chest normal, lungs clear to auscultation , no respiratory distress  CV:  regular rate and rhythm, no murmur, rub, or gallop, no edema, +2 pedal pulses  ABDOMEN:  normal bowel sounds, soft, nontender, no hepatosplenomegaly or other masses, no guarding or rebound  M/S:   Angel lift for all transfers. Wheelchair bound. Staff to assist for ADLs, transfers and cares  SKIN:  Inspection of skin and subcutaneous tissue baseline, wound healing well, no signs of infection staples present to mid abdomen  NEURO:   Cranial nerves 2-12 are normal tested and grossly at patient's baseline, no purposeful movement in upper and lower extremities  PSYCH:  oriented X 3, memory impaired , affect and mood " normal    Most Recent 3 CBC's:  Recent Labs   Lab Test 06/16/25  1038 06/12/25  0550 06/11/25  0445   WBC 8.8 8.4 10.2   HGB 7.5* 7.6* 7.5*   MCV 95 91 91    230 254     Most Recent 3 BMP's:  Recent Labs   Lab Test 06/16/25  1038 06/12/25  0734 06/12/25  0550 06/11/25  0723 06/11/25  0445     --  141  --  141   POTASSIUM 4.9  --  4.2  --  4.4   CHLORIDE 105  --  107  --  108*   CO2 22  --  25  --  21*   BUN 33.5*  --  45.1*  --  46.7*   CR 1.74*  --  1.62*  --  1.55*   ANIONGAP 12  --  9  --  12   KEILY 8.4*  --  8.4*  --  8.4*   GLC 93 95 107*   < > 182*    < > = values in this interval not displayed.     Most Recent 2 LFT's:  Recent Labs   Lab Test 06/16/25  1038 06/06/25  0850 06/04/25  0611   AST 22  --  21   ALT 18  --  20   ALKPHOS 93  --  137   BILITOTAL <0.2 0.4 0.2     Most Recent Hemoglobin A1c:  Recent Labs   Lab Test 05/29/25  1515   A1C 6.3*     Most Recent Anemia Panel:  Recent Labs   Lab Test 06/16/25  1038 04/14/25  1410 04/07/25  0945 09/04/24  1008 06/15/23  1028   WBC 8.8   < > 6.5   < > 7.6   HGB 7.5*   < > 9.1*   < > 12.1   HCT 24.8*   < > 30.0*   < > 38.4   MCV 95   < > 88   < > 92      < > 351   < > 345   IRON  --   --  36*   < >  --    IRONSAT  --   --  13*   < >  --    FEB  --   --  277   < >  --    LAURA  --   --  44   < >  --    B12  --   --   --   --  456    < > = values in this interval not displayed.     Magnesium   Date Value Ref Range Status   06/12/2025 1.9 1.7 - 2.3 mg/dL Final   08/05/2019 2.2 1.6 - 2.3 mg/dL Final     Vitamin D Deficiency Screening Results:  Lab Results   Component Value Date    VITDT 30 04/04/2025    VITDT 15 (L) 12/22/2012    VITDT 27 (L) 09/27/2012     ASSESSMENT/PLAN:  Diverticulitis complicated by abscess status post open sigmoidectomy with end colostomy 5/30/25  History of Sepsis due to Klebsiella pneumoniae and Pseudomonas aeruginosa bacteremia 5/28, likely of intra-abdominal origin   Hypotension due to sepsis  Comment: Long multiple  hospitalization course. She required a slow steroid taper because of blood pressure dysregulations. We held her home antihypertensives while titrating steroids. We transitioned from meropenem to ceftolozane-tazobactam due to resistance (psuedomonas coverage) and switched vancomycin to linezolid in the setting of acute kidney injury for coverage of previous vancomycin-resistent enterococci (E Facecium). From infectious disease's perspective, there were no good oral antibiotic options and so she completed a 7-day IV antibiotic course at the hospital. There was new cloudy PATY drain output on 6/6 and ongoing leukocytosis but no new localizing symptoms so we continued to monitor and found. She had been seen in colorectal surgery clinic the day prior (5/27) where it was decided that she should undergo open sigmoidectomy with DLI. However, given her acute onset symptoms, we think she may need surgery sooner than anticipated. She is currently hemodynamically stable with a benign abdominal exam which doesn't warrant any sort of emergent intervention. Additionally, she has a complex past medical history so further optimization is required.   Plan:  -Follow up with ID as directed. Scheduled for 7/2/25  -Follow up with Colorectal Surgery as directed. Scheduled for 6/24 and 7/15/25  -High dose Vitamin A x 30 days post-op (through 6/30)  -Change nutrisource supply to personal benefiber packet daily per request  -BMP and CBC due 6/23/25  -Continue colostomy care as directed:  *Peristomal wound: With pouch changes - clean open wound to medial side of stoma with wound spray. Cut piece of Hydrofera Blue Classic to size of open area. Wet Hydrofera Blue (#[id number]) with saline and squeeze out excess, place in wound base, apply small bead of ostomy paste to the medial edge of hydrafera blue/Pt's skin to help hold in/seal hydrafera blue, then cover with additional ostomy ring to secure, apply wafer and caulk open creases around barrier  "ring, then attach pouch   *LLQ Colostomy pouching plan:   Pouching system: ostomy supplies pouches: [place] 57 FECAL ([id number]) ostomy supplies barrier: [place] 57mm SOFT CONVEX ([id number])  Accessories used: [facility] ostomy accessories: 2\" Cera Barrier Ring ([id number]) and Cavilon no sting barrier film ([id number])   Frequency of pouch changes: Twice weekly and PRN leakage  Bedside RN interventions: Change pouch PRN if leaking using the supplies above, Empty pouch when 1/3 to 1/2 full, ensure to clean pouch outlet after emptying to prevent odor, Notify [facility] for ongoing pouch leakage, Document stoma appearance and output volume, color, and consistency every shift, Encourage patient to empty pouch with assist, and Assist patient to measure and record output      Liver transplant in 2002 2/2 primary biliary cirrhosis  EBV +  Reactive Leukocytosis   Sjogren's syndrome  Comment: Chronic. Follows Dr. Ramirez. Transplant Hepatology was consulted and advised against sirolimus. PTA prednisone 9mg was resumed after weaning off IV hydrocortisone   Plan:  -Continue Tacrolimus Oral suspension 2mg/2mL BID. Must be suspension given unable to tolerate capsule due to allergy to filler--to be filled by [facility]  -Continue prednisone 9mg daily for now. (5mg with 4 tablets of 1mg=9mg total)  -Continue PTA ursodiol 250 mg BID  -Follow up with transplant team as directed. Scheduled for 8/22/25  -Follow up with nephrology as directed. Scheduled for 7/23/25  -BMP and CBC due 6/23/25     Depression   Protein calorie deficit malnutrition    Comment: Chronic. Appetite fair. She is worried about some weight loss, however per PCC her weights have been stable around 180# since April 2025. [facility] 26/30  Plan:  -Monitor mood and behaviors  -Dietician to remain involved to assist with supplemental needs  -Continue chocolate glucerna BID with meals for now--dietician to adjust accordingly  -Monitor for worsening s/symptoms of concerns  -Monitor " for changes in mobility, eating and sleeping patterns  -Continue zoloft 50mg daily.   -ACP while on TCU  -BMP and CBC due 6/23/25     Mild Obstructive Sleep Apnea   Nocturnal Hypoxemia   Pleural effusions  SOB  Comment: Chronic. Sleep study in 2018 showing mild RASHIDA with recommendation to start CPAP. It does not appear that there was further follow up and not using CPAP. See results of previous CT scan as indicated above.   Plan:  -Monitor sleeping patterns  -Continue albuterol inhaler every 4 hours PRN. May HUNTER  -Monitor respiratory status  -PCP to consider sleep medicine referral  -BMP and CBC due 6/23/25     CKD3b w/ moderate proteinuria, at baseline  HTN  CVD/HLD  paroxysmal atrial fibrillation  Comment: Chronic. Baseline Cr. 1.5. History of dialysis at time of liver transplant 23 years ago. Recurrent AKIs and immunosuppressive medication toxicity. Not on SGLT2i 2/2 recurrent UTI. Chart review w/ unclear heart failure history, unconfirmed with EF 60-65% on 3/10/25. BNP 1446 elevated from 1 month ago. Given unclear HF history, trace bilateral LE edema, with some pulm edema noted on imaging, consideration for volume overload. However, without orthopnea or clear cough. Per cardiology on 6/17/25 Loop recording: Patient has been in persistent AF since 6/2/25. She has a history of pAF but this is the first persistent episode logged. She was hospitalized from 5/28-6/12 with septic shock secondary to diverticulitis complicated by abscess which required sigmoidectomy and colostomy. She states that during her hospitalization her telemetry frequently alerted for HRs in the 40's. Her loop recorder is set to alert for HRs < 40bpm... we did an alert for 52 zhou episodes with most recent logged on 6/3/25... ECGs show SB in the mid to upper-30's with longest episode exceeding 1hr. She is scheduled to establish cares with Dr. Morgan in August.   Plan:  -Continue WITHOUT antihypertensives at this time (stopped Amlodipine,  hydralazine, metoprolol were stopped in hospital)  -Monitor BP and HR  -Continue PTA evolocumab q2week.   -Continue PTA eztimibe 10mg every day  -Continue apixaban 2.5mg BID  -Follow up with cardiology as directed.   -BMP and CBC due 6/23/25     Concern for transient ischemic attack  Comment: Acute. In the morning of 6/4, she stated that she had speech problems in the last couple of days since the surgery. She described that she has trouble getting her words out and that her speech did not feel as fluent as it was previously.  She denied any new numbness, tingling, headache or changes in vision. CT head was negative for intracranial bleed or other acute process and TTE with bubble study was also negative. It is possible that she may have had a TIA following surgery as anticoagulation had been held. MRI brain was negative for acute ischemic changes and her speech has returned to baseline.   Plan:  -Continue PTA Xarelto   -No need for neuro follow-up   -BMP and CBC due 6/23/25     T2DM  (A1c 6.8% 4/14/25)  Comment: Chronic. Last A1c 6.8% on 4/14/25.   Plan:  -Continue PTA linagliptin 5mg every day  -Continue PTA gabapentin 300mg at bedtime  -Blood Glucose monitoring back to previous schedule of BID. HOLD off on using freestyle phil 2 sensor while on TCU  -BMP and CBC due 6/23/25  -Obtain hgb A1c as directed. Due July 2025     Acute kidney injury on CKD3, resolved   Bilateral stent placement per urology 5/29  Acute tubular necrosis  Comment: Her baseline creatinine is 1.3-1.6. She had an DERREK in the setting of hypotension, shock, and nephrotoxic meds which resolved after receiving IV fluids and improved PO after surgery.   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 Wound does not look infected. She now report having skin lesion to  left anterior forearm. 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 Will defer to dermatology  Plan:  -Monitor for worsening s/symptoms of concerns  -Follow up with dermatology      Acute blood loss anemia   Anemia of critical illness  coagulopathy due to cirrhosis and surgical blood loss    thrombocytopenia   Comment: Chronic. Per recent hospitalization-Did not find any signs of overt bleeding and her ostomy output and vitals were stable. Continued her ferous sulfate and folic acid inpatient. She received 1 unit of packed red blood cells on 6/7. Stable at the time of discharge. Now average around mid 7  Plan:  -Monitor bleeding risks  -Continue PTA ferrous sulfate daily  -Recommend transfusion if hgb <7  -Continue folic acid daily  -BMP and CBC due 6/23/25     Hypothyroidism  Papillary thyroid carcinoma  Chronic. Recent TSH~1.72 on 4/4/25. S/p thyroidectomy 2010  Plan;  -Continue PTA levothyroxine 175mcg qD   -Monitor for worsening s/symptoms of concerns  -Repeat thyroid panel in July 2025     Chronic Shoulder Pain  Osteoarthritis   Lower back pain  Comment: Chronic. stable  Plan:  -Monitor pain complaints  -Continue diclofenac gel application to painful areas QID  -Continue Tylenol 1000mg BID   -Continue gabapentin 300mg at HS--only able to tolerate liquid given gelatin capsule allergy     Constipation   Nausea  GERD  Comment: Chronic. S/T polypharmacy.   Plan:  -Monitor BM patterns  -Continue TUMS 1000mg every 4 hours PRN  -Continue beneFiber qd    -Zofran PRN     Epistaxis  Comment: Acute on chronic. She reports occasional nose bleed at times. No episodes while on TCU.   Plan:  -Monitor bleeding risks  -Monitor for worsening s/symptoms of concerns  -Afrin BID PRN on TCU if warranted     Vaginal bleeding  Comment: Acute on chronic. Referred to OBGYN. Last menstrual period she reports has been 40 years ago or so. Known family history of endometrial cancer that metastasized to bowel per  her reports. She reports her mother had extensive genetic testing for review in EPIC. Reports mother name: Anne Yap  3/9/23 for reference if indicated. Per OBGYN on 25:  If the endometrial stripe is greater than 4 mm or if it is less than 4 mm and she continues to have bleeding, she will need tissue evaluation with either office endometrial biopsy or hysteroscopy with D&C in the operating room. She does not believe she can tolerate an endometrial biopsy in the clinic, so we discussed doing this as an outpatient procedure.  Plan:  -Monitor for worsening s/symptoms of concerns  -Follow up with OBGYN as directed  -BMP and CBC due 25     Physical deconditioning  Generalized muscle weakness  History of falls  Comment: Acute on chronic. Known poor history PTA. [facility] given concerns. Per therapy-Patient requires MOdA for bed and UB needs. Maximum assistance for LB needs and toileting. Angel lift for all transfers given weakness. Increased anxiety and fear with transfers since TCU return.   Plan:  -Continue Physical therapy and Occupational therapy  -Recommend cognitive testing on site  -Highly recommend MIKEY compliance post TCU     Electronically signed by:  Dr. Manda Flor DNP, APRN, FNP-C, WCS-C, EDS-C     Provider reviewed records from facility, and interpreted most recent imaging/lab work, and vital signs.   Acute and chronic conditions managed by writer. Have been reviewed during today's exam         Sincerely,        Manda Flor, LIZ CNP    Electronically signed

## 2025-06-18 NOTE — TELEPHONE ENCOUNTER
I am out of town until 6/28, will defer urgent questions to the clinical resource or DOMI's on Dr. Zuluaga's team.  At this point she is asymptomatic and on appropriate anticoagulation.  Will continue to monitor for symptoms such as syncope or presyncope or exertional dyspnea and attend scheduled follow-up.  Thanks.

## 2025-06-19 LAB
MDC_IDC_EPISODE_DTM: NORMAL
MDC_IDC_EPISODE_DTM: NORMAL
MDC_IDC_EPISODE_DURATION: 147 S
MDC_IDC_EPISODE_DURATION: 193 S
MDC_IDC_EPISODE_ID: 995
MDC_IDC_EPISODE_ID: 996
MDC_IDC_EPISODE_TYPE: NORMAL
MDC_IDC_EPISODE_TYPE: NORMAL
MDC_IDC_MSMT_BATTERY_STATUS: NORMAL
MDC_IDC_PG_IMPLANT_DTM: NORMAL
MDC_IDC_PG_MFG: NORMAL
MDC_IDC_PG_MODEL: NORMAL
MDC_IDC_PG_SERIAL: NORMAL
MDC_IDC_PG_TYPE: NORMAL
MDC_IDC_SESS_CLINIC_NAME: NORMAL
MDC_IDC_SESS_DTM: NORMAL
MDC_IDC_SESS_TYPE: NORMAL
MDC_IDC_SET_ZONE_DETECTION_BEATS_DENOMINATOR: 12 {BEATS}
MDC_IDC_SET_ZONE_DETECTION_BEATS_DENOMINATOR: 16 {BEATS}
MDC_IDC_SET_ZONE_DETECTION_BEATS_NUMERATOR: 12 {BEATS}
MDC_IDC_SET_ZONE_DETECTION_BEATS_NUMERATOR: 16 {BEATS}
MDC_IDC_SET_ZONE_DETECTION_INTERVAL: 1500 MS
MDC_IDC_SET_ZONE_DETECTION_INTERVAL: 380 MS
MDC_IDC_SET_ZONE_DETECTION_INTERVAL: 5000 MS
MDC_IDC_SET_ZONE_STATUS: NORMAL
MDC_IDC_SET_ZONE_TYPE: NORMAL
MDC_IDC_SET_ZONE_VENDOR_TYPE: NORMAL
MDC_IDC_STAT_AT_BURDEN_PERCENT: 99.8 %
MDC_IDC_STAT_AT_DTM_END: NORMAL
MDC_IDC_STAT_AT_DTM_START: NORMAL
MDC_IDC_STAT_EPISODE_RECENT_COUNT: 0
MDC_IDC_STAT_EPISODE_RECENT_COUNT: 2
MDC_IDC_STAT_EPISODE_RECENT_COUNT_DTM_END: NORMAL
MDC_IDC_STAT_EPISODE_RECENT_COUNT_DTM_START: NORMAL
MDC_IDC_STAT_EPISODE_TOTAL_COUNT: 0
MDC_IDC_STAT_EPISODE_TOTAL_COUNT: 499
MDC_IDC_STAT_EPISODE_TOTAL_COUNT: 52
MDC_IDC_STAT_EPISODE_TOTAL_COUNT: 72
MDC_IDC_STAT_EPISODE_TOTAL_COUNT_DTM_END: NORMAL
MDC_IDC_STAT_EPISODE_TOTAL_COUNT_DTM_START: NORMAL
MDC_IDC_STAT_EPISODE_TYPE: NORMAL

## 2025-06-20 ENCOUNTER — LAB REQUISITION (OUTPATIENT)
Dept: LAB | Facility: CLINIC | Age: 75
End: 2025-06-20
Payer: MEDICARE

## 2025-06-20 ENCOUNTER — TRANSITIONAL CARE UNIT VISIT (OUTPATIENT)
Dept: GERIATRICS | Facility: CLINIC | Age: 75
End: 2025-06-20
Payer: MEDICARE

## 2025-06-20 VITALS
DIASTOLIC BLOOD PRESSURE: 81 MMHG | OXYGEN SATURATION: 94 % | WEIGHT: 181 LBS | SYSTOLIC BLOOD PRESSURE: 132 MMHG | HEIGHT: 68 IN | TEMPERATURE: 94.5 F | BODY MASS INDEX: 27.43 KG/M2 | RESPIRATION RATE: 18 BRPM | HEART RATE: 88 BPM

## 2025-06-20 DIAGNOSIS — N18.32 TYPE 2 DIABETES MELLITUS WITH STAGE 3B CHRONIC KIDNEY DISEASE, WITHOUT LONG-TERM CURRENT USE OF INSULIN (H): ICD-10-CM

## 2025-06-20 DIAGNOSIS — N18.32 STAGE 3B CHRONIC KIDNEY DISEASE (H): ICD-10-CM

## 2025-06-20 DIAGNOSIS — M25.519 CHRONIC SHOULDER PAIN, UNSPECIFIED LATERALITY: ICD-10-CM

## 2025-06-20 DIAGNOSIS — K65.0: ICD-10-CM

## 2025-06-20 DIAGNOSIS — N18.30 CHRONIC KIDNEY DISEASE, STAGE 3 UNSPECIFIED (H): ICD-10-CM

## 2025-06-20 DIAGNOSIS — I48.0 PAF (PAROXYSMAL ATRIAL FIBRILLATION) (H): ICD-10-CM

## 2025-06-20 DIAGNOSIS — G89.29 CHRONIC SHOULDER PAIN, UNSPECIFIED LATERALITY: ICD-10-CM

## 2025-06-20 DIAGNOSIS — Z94.4 STATUS POST LIVER TRANSPLANTATION (H): ICD-10-CM

## 2025-06-20 DIAGNOSIS — E03.9 HYPOTHYROIDISM, UNSPECIFIED TYPE: ICD-10-CM

## 2025-06-20 DIAGNOSIS — E11.22 TYPE 2 DIABETES MELLITUS WITH STAGE 3B CHRONIC KIDNEY DISEASE, WITHOUT LONG-TERM CURRENT USE OF INSULIN (H): ICD-10-CM

## 2025-06-20 DIAGNOSIS — K65.1 PERITONEAL ABSCESS (H): ICD-10-CM

## 2025-06-20 DIAGNOSIS — K57.32 DIVERTICULITIS OF COLON: ICD-10-CM

## 2025-06-20 DIAGNOSIS — Z48.89 AFTERCARE FOLLOWING SURGERY: Primary | ICD-10-CM

## 2025-06-20 DIAGNOSIS — D64.9 ACUTE ON CHRONIC ANEMIA: ICD-10-CM

## 2025-06-20 DIAGNOSIS — Z93.3 COLOSTOMY PRESENT (H): ICD-10-CM

## 2025-06-20 DIAGNOSIS — D64.9 ANEMIA, UNSPECIFIED: ICD-10-CM

## 2025-06-20 DIAGNOSIS — I15.2 HYPERTENSION SECONDARY TO ENDOCRINE DISORDERS: ICD-10-CM

## 2025-06-20 DIAGNOSIS — R53.81 PHYSICAL DECONDITIONING: ICD-10-CM

## 2025-06-20 PROCEDURE — 99305 1ST NF CARE MODERATE MDM 35: CPT | Performed by: INTERNAL MEDICINE

## 2025-06-20 RX ORDER — TRIAMCINOLONE ACETONIDE 1 MG/G
OINTMENT TOPICAL 2 TIMES DAILY
COMMUNITY

## 2025-06-20 NOTE — PROGRESS NOTES
[facility] GERIATRICS  INITIAL VISIT NOTE  June 20, 2025      PRIMARY CARE PROVIDER AND CLINIC:  Daniel Hidalgo [address] / [facility] [id number]    [facility] Medical Record Number:  [id number]  Place of Service where encounter took place:  [facility])([facility]) [[id number]]    Chief Complaint   Patient presents with    Hospital F/U       HPI:    Luz Thompson is a 75 year old  (1950) female seen today at [facility]. Medical history is notable for PBC s/p liver transplant (2002), CAD, HTN, a-fib, DM,CVA, CKD, anemia, SCC s/p MOHS of left cheek (4/8/25), recurrent ESBL UTIs and recent sigmoid microperforation w/ intra-abdominal infection for which she was in this TCU in on IV abx. She developed belly pain and hypotension at the TCU and was hospitalized at [facility] from 5/28/25 to 6/12/25 with diverticulitis complicated by abscess s/p open sigmoidectomy with end colostomy (5/30/25) and sepsis secondary to Klebsiella, Pseudomonas and VRE.  She required stress dose steroids due to adrenal insufficiency in setting of sepsis.  Labs with DERREK on CKD secondary to ATN in the setting of sepsis and had resolved prior to hospital discharge. She was admitted to this facility for  rehab, medical management, and nursing care.      History obtained from: facility chart records, facility staff, patient report, and [facility] chart review.      Today, Ms. Thompson is seen in her room sitting up in bed.  She was working on taking her morning medications.  No acute concerns today either from her or from nursing.  Reviewed the ine low-grade temperature which prompted the x-ray.  She has no shortness of breath or cough.  Discussed that should she develop either of those things or if she feels any chills to let nursing know, and we may consider another workup.  She is working with therapies.    CODE STATUS: CPR/Full code     ALLERGIES:  Allergies    Allergen Reactions    Fluconazole Hives and Itching     Full body hives  **Intradermal skin testing negative**  [See intradermal skin testing results from 8/2/2019]    Mycophenolate Diarrhea and Nausea and Vomiting     Patient stated it was chronic and lasted months      Penicillins Anaphylaxis, Hives, Itching and Rash     **Intradermal skin testing negative**  [See intradermal skin testing results from 8/2/2019]      Simvastatin Muscle Pain (Myalgia)     severe  Other reaction(s): Myalgia caused by statin    Methotrexate Other (See Comments)     Other reaction(s): Sore  Sores in mouth, esophagus, and stomach.       Morphine And Codeine Itching and Other (See Comments)     Psych disturbance  Other reaction(s): Confusion, Mood alteration    Quinolones Anxiety, Dizziness, Headache, Other (See Comments), Palpitations and Unknown     Other reaction(s): Hyperactive behavior, Lightheadedness, Mood alteration    Dizzy, light headed    Dizziness, shaky, and jumpy    Capsules, Empty Gelatin [Gelatin]     Carvedilol Diarrhea     Per pt - severe diarrhea and LE swelling  + tolerating Toprol    Lansoprazole Diarrhea    Strawberry Extract     Azithromycin Itching     [See intradermal skin testing results from 8/2/2019]    Bactrim [Sulfamethoxazole-Trimethoprim] Other (See Comments)     Numb mouth, tingling lips (treated with anti-histamines)    Cephalosporins Itching     [See intradermal skin testing results from 8/2/2019]    Ciprofloxacin Hcl Other (See Comments) and Dizziness     Insomnia, mood lability, Irregular heart beat         Doxycycline Itching and Unknown     [See intradermal skin testing results from 8/2/2019]    Lisinopril Cough    Omeprazole Itching    Tigecycline Other (See Comments)     Perioral numbness in 2025 with long-term use    Tolectin [Nsaids] Rash    Tolmetin Rash and Itching    Tramadol Rash, Hives and Itching       PAST MEDICAL HISTORY:   Past Medical History:   Diagnosis Date    Anemia of chronic  "disease 10/17/2011    Anxiety     CKD (chronic kidney disease) stage 3, GFR 30-59 ml/min (H) 04/04/2012    Coccidioidomycosis, 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       PAST SURGICAL HISTORY:   Past Surgical History:   Procedure Laterality Date    APPENDECTOMY  1961    CATARACT IOL, RT/LT      RE12/19/2013, LE12/10/2013 - Toric lenses    CHOLECYSTECTOMY  1991    COLECTOMY WITH COLOSTOMY, COMBINED N/A 5/29/2025    Procedure: Open sigmoidectomy with end colostmy;  Surgeon: Al Campbell MD;  Location: [facility] OR    COLONOSCOPY  03/10/2014    Procedure: COLONOSCOPY;;  Surgeon: Gentry Ramirez MD;  Location:  GI    CYSTOSCOPY      ear drum repair      ENDOBRONCHIAL ULTRASOUND FLEXIBLE N/A 09/29/2017    Procedure: ENDOBRONCHIAL ULTRASOUND FLEXIBLE;  Flexible Bronchoscopy, Endobronchial Ultrasound, " Transbronchial Needle Aspiration ;  Surgeon: Eden Clinton MD;  Location: [facility] OR    ENDOSCOPIC RETROGRADE CHOLANGIOPANCREATOGRAM  2013    Procedure: ENDOSCOPIC RETROGRADE CHOLANGIOPANCREATOGRAM;  Endoscopic Retrograde Cholangiopancreatogram with single balloon enteroscopy, ballon sweep of bile duct;  Surgeon: Brett Membreno MD;  Location: [facility] OR    HC KNEE SCOPE,MED/LAT MENISECTOMY Right 08/10/2012    partial medial menisectomy only    INSERT STENT URETER Bilateral 2025    Procedure: bilateral Insert and removal stent ureter, cystoscopy;  Surgeon: David Leung MD;  Location: [facility] OR    KNEE SURGERY  1966    R knee    PICC INSERTION  2013    4fr SL PASV PICC, 40cm (1cm external) in the R basilic vein w/ tip in the low SVC    PICC INSERTION  2014    5 fr DL BioFlo Navilyst PICC, 46 cm (3 cm external) in the L basilic vein w/ tip in the SVC RA junction.    THYROIDECTOMY  2010    TRANSPLANT LIVER RECIPIENT LIVING UNRELATED  2002       FAMILY HISTORY:   Family History   Problem Relation Age of Onset    Hypertension Mother     Endometrial Cancer Mother     Hyperlipidemia Mother     Prostate Cancer Father     Macular Degeneration Father     Cancer - colorectal Maternal Grandmother         in her 80's, has surgery and removal of part of kidney,  at age 98    Heart Disease Maternal Grandfather          at 98    Glaucoma Maternal Grandfather     Cerebrovascular Disease Paternal Grandmother         in her 80's    Hypertension Paternal Grandmother     Heart Disease Paternal Grandfather         MI    Alzheimer Disease Paternal Grandfather     Allergies Son     Neurologic Disorder Daughter         Migraines    Breast Cancer Other     Anesthesia Reaction No family hx of     Crohn's Disease No family hx of     Ulcerative Colitis No family hx of     Melanoma No family hx of     Skin Cancer No family hx of      SOCIAL HISTORY:   [place] [facility] [place] [facility] [place] [facility]      MEDICATIONS:  Post Discharge Medication Reconciliation Status: discharge medications reconciled and changed, per note/orders.  Current Outpatient Medications   Medication Sig Dispense Refill    acetaminophen (TYLENOL) 500 MG tablet Take 1,000 mg by mouth 2 times daily. During 4/16/25 [facility] pt states take BID everyday      albuterol (PROAIR HFA/PROVENTIL HFA/VENTOLIN HFA) 108 (90 Base) MCG/ACT inhaler Inhale 2 puffs into the lungs every 4 hours as needed for shortness of breath, wheezing or cough. 18 g 0    apixaban ANTICOAGULANT (ELIQUIS) 2.5 MG tablet Take 1 tablet (2.5 mg) by mouth 2 times daily. 60 tablet 0    calcitRIOL (ROCALTROL) 0.25 MCG capsule Take 1 capsule (0.25 mcg) by mouth daily. 90 capsule 1    calcium carbonate (TUMS) 500 MG chewable tablet Take 2 tablets (1,000 mg) by mouth every 4 hours as needed for heartburn.      D-MANNOSE PO Take 1 Scoop by mouth 2 times daily.      diphenhydrAMINE (BENADRYL) 25 MG tablet Take 25 mg by mouth every 6 hours as needed for itching or allergies.      EPINEPHrine (ANY BX GENERIC EQUIV) 0.3 MG/0.3ML injection 2-pack Inject 0.3 mLs (0.3 mg) into the muscle as needed for anaphylaxis. May repeat one time in 5-15 minutes if response to initial dose is inadequate. 2 each 1    estradiol (ESTRACE) 0.1 MG/GM vaginal cream Place vaginally three times a week.      evolocumab (REPATHA SURECLICK) 140 MG/ML prefilled autoinjector Inject 1 mL (140 mg) subcutaneously every 14 days. 6 mL 3    ezetimibe (ZETIA) 10 MG tablet Take 1 tablet (10 mg) by mouth daily. 90 tablet 3    Ferrous Sulfate 324 MG TBEC Take 1 tablet by mouth daily.      folic acid (FOLVITE) 1 MG tablet TAKE 1 TABLET BY MOUTH DAILY 90 tablet 3    gabapentin (NEURONTIN) 250 MG/5ML solution Take 6 mLs (300 mg) by mouth at bedtime 180 mL 0    glucose (BD GLUCOSE) 5 g chewable tablet Take 2 tablets (10 g) by mouth as needed (low blood sugar) 40 tablet 1    glucose 40 % (400 mg/mL) gel Take 15 g by mouth every 15  minutes as needed for low blood sugar.      levothyroxine (SYNTHROID/LEVOTHROID) 175 MCG tablet Take 1 tablet (175 mcg) by mouth daily. 90 tablet 1    linagliptin (TRADJENTA) 5 MG TABS tablet Take 1 tablet (5 mg) by mouth daily. 90 tablet 1    Multiple Vitamins-Minerals (PRESERVISION AREDS 2) CAPS Take 1 capsule by mouth 2 times daily      Nutrisource Fiber PO packet Take 1 packet by mouth daily. Benefiber      omega-3 acid ethyl esters (LOVAZA) 1 g capsule Take 1 capsule (1 g) by mouth 2 times daily.      ondansetron (ZOFRAN) 4 MG tablet Take 1 tablet (4 mg) by mouth every 6 hours as needed for nausea.      oxymetazoline (AFRIN) 0.05 % nasal spray Spray 0.2 mLs (2 sprays) into both nostrils 2 times daily as needed for congestion. 30 mL 0    predniSONE (DELTASONE) 1 MG tablet Take 4 tablets (4 mg) by mouth daily. Take 4mg (1mg tablets x4) and Take with 5mg tablet to equal 9mg daily 90 tablet 0    predniSONE (DELTASONE) 5 MG tablet Take 1 tablet (5 mg) by mouth daily. Take with 5mg tablet with 4mg (1mg tablets x4) to equal 9mg daily 30 tablet 0    sertraline (ZOLOFT) 50 MG tablet Take 1 tablet (50 mg) by mouth daily. 30 tablet 0    tacrolimus (GENERIC) 1 mg/mL suspension Take 2 mLs (2 mg) by mouth 2 times daily. 120 mL 11    triamcinolone (KENALOG) 0.1 % external ointment Apply topically 2 times daily.      ursodiol (ACTIGALL) 250 MG tablet Take 1 tablet (250 mg) by mouth 2 times daily. 180 tablet 3    vitamin A 3 MG (83777 UNITS) capsule Take 2 capsules (20,000 Units) by mouth daily for 18 days.      Continuous Glucose Sensor (FREESTYLE NINO 2 SENSOR) MISC Change every 14 days. 2 each 5    Glucagon (GVOKE HYPOPEN) 1 MG/0.2ML pen Inject the contents of 1 device under the skin into lower abdomen, outer thigh, or outer upper arm as needed for hypoglycemia. If no response after 15 minutes, additional 1 mg dose from a new device may be injected while waiting for emergency assistance.         ROS:  4 point ROS neg other  "than the symptoms noted above in the HPI.    PHYSICAL EXAM:  /81   Pulse 88   Temp (!) 94.5  F (34.7  C)   Resp 18   Ht 1.737 m (5' 8.4\")   Wt 82.1 kg (181 lb)   LMP 06/01/1988 (Approximate)   SpO2 94%   BMI 27.20 kg/m    Gen: sitting up in bed, alert, cooperative and in no acute distress  Card: RRR, S1, S2, no murmurs  Resp: lungs clear to auscultation bilaterally anteriorly and laterally, no crackles or wheezes; moving good air  Ext: no LE edema  Neuro: CX II-XII grossly in tact; ROM in all four extremities grossly in tact  Psych: alert and oriented x3; normal affect      LABORATORY/IMAGING DATA:  Reviewed as per [facility] and/or [facility]    ASSESSMENT/PLAN:    Diverticulitis s/p Open Sigmoidectomy and End Colostomy (5/30/25)  This was in setting of recent sigmoid microperforation intra-abdominal infection.  Reports no belly pain today.  --Routine colostomy cares  --Follow-up with colorectal surgery as scheduled     CAD, HTN  SBPs 110s. HR 60s-80s. Weight 188 --> 185 --> 181 in setting of IVF resuscitation/sepsis.  PTA Amlodipine 5 mg daily, hydralazine 50 mg TID, metoprolol XL 25 mg daily all remain on hold due to hypotension after critical illness/sepsis.  -- Ezetimibe 10 mg daily, fish oil BID   -- follow BPs and add back anti-HTN medications as needed - no indication to do so today     Paroxysmal Atrial Fibrillation  Hx CVA  HR 60s-80s.  PTA Metoprolol XL 25 mg daily remains on hold (see above)  -- Apixaban 2.5 mg BID     DM, Type II  Hgb A1c 6.8 in April. Sugars 70s-90s (am) and 140s--160s (cait)  -- Linagliptin 5 mg daily  -- Follow sugars PRN     PBC s/p Liver Transplant (2002)  -- evolocumab 140 mg subQ q14d  -- prednisone 9 mg daily   -- tacrolimus 2 mg BID  -- ursodiol 250 mg  BID      Cognitive Impairment  SLUMS 26/30 during recent TCU stay[place] [facility] [place] [facility]   -- OT following     Recurrent ESBL UTIs  -- D-Mannose BID     Major Recurrent Depression  Was seen by psychiatry during " initial hospitalization and started on sertraline.  Mood and spirits were good today  -- Sertraline 50 mg daily  -- supportive cares      Chronic Shoulder Pain  -- APAP 1000 mg BID, gabapentin 300 mg at bedtime     Acute on Chronic Fe Deficiency Anemia  Baseline Hgb 9-10. Hgb recently has been in 7s in setting of critical illness. Hgb stable at 7.5 today.   -- FeSO4 324 mg daily, folic acid 1 mg daily  -- would not follow Hgb not more often than [id number] unless signs of bleeding or hypotension     CKD, Stage III  ATN, Resolved   Baseline Cr 1.3-1.8. Cr 1.86 today. Lytes OK.   -- Calcitriol 0.25 mcg daily  -- periodic BMP      Hypothyroidisim  TSH 1.72 in April.   -- levothyroxine 175 mcg daily      Physical Deconditioning  In setting of hospitalization and underlying medical conditions  -- ongoing PT/OT       Electronically signed by:  Gloria Almaraz MD

## 2025-06-20 NOTE — LETTER
6/20/2025      Luz Thompson  [address]  [facility] [address]        [facility]  INITIAL VISIT NOTE  June 20, 2025      PRIMARY CARE PROVIDER AND CLINIC:  Daniel Hidalgo [address] / [facility] [address]    [facility] Medical Record Number:  [id number]  Place of Service where encounter took place:  [facility] ([facility])([facility]) [[id number]]    Chief Complaint   Patient presents with     Hospital F/U       HPI:    Luz Thompson is a 75 year old  (1950) female seen today at [facility]. Medical history is notable for PBC s/p liver transplant (2002), CAD, HTN, a-fib, DM,CVA, CKD, anemia, SCC s/p MOHS of left cheek (4/8/25), recurrent ESBL UTIs and recent sigmoid microperforation w/ intra-abdominal infection for which she was in this TCU in on IV abx. She developed belly pain and hypotension at the TCU and was hospitalized at [facility] from 5/28/25 to 6/12/25 with diverticulitis complicated by abscess s/p open sigmoidectomy with end colostomy (5/30/25) and sepsis secondary to Klebsiella, Pseudomonas and VRE.  She required stress dose steroids due to adrenal insufficiency in setting of sepsis.  Labs with DERREK on CKD secondary to ATN in the setting of sepsis and had resolved prior to hospital discharge. She was admitted to this facility for  rehab, medical management, and nursing care.      History obtained from: facility chart records, facility staff, patient report, and [facility] chart review.      Today, Ms. Thompson is seen in her room sitting up in bed.  She was working on taking her morning medications.  No acute concerns today either from her or from nursing.  Reviewed the ine low-grade temperature which prompted the x-ray.  She has no shortness of breath or cough.  Discussed that should she develop either of those things or if she feels any chills to let nursing know, and we may consider another workup.  She is  working with therapies.    CODE STATUS: CPR/Full code     ALLERGIES:  Allergies   Allergen Reactions     Fluconazole Hives and Itching     Full body hives  **Intradermal skin testing negative**  [See intradermal skin testing results from 8/2/2019]     Mycophenolate Diarrhea and Nausea and Vomiting     Patient stated it was chronic and lasted months       Penicillins Anaphylaxis, Hives, Itching and Rash     **Intradermal skin testing negative**  [See intradermal skin testing results from 8/2/2019]       Simvastatin Muscle Pain (Myalgia)     severe  Other reaction(s): Myalgia caused by statin     Methotrexate Other (See Comments)     Other reaction(s): Sore  Sores in mouth, esophagus, and stomach.        Morphine And Codeine Itching and Other (See Comments)     Psych disturbance  Other reaction(s): Confusion, Mood alteration     Quinolones Anxiety, Dizziness, Headache, Other (See Comments), Palpitations and Unknown     Other reaction(s): Hyperactive behavior, Lightheadedness, Mood alteration    Dizzy, light headed    Dizziness, shaky, and jumpy     Capsules, Empty Gelatin [Gelatin]      Carvedilol Diarrhea     Per pt - severe diarrhea and LE swelling  + tolerating Toprol     Lansoprazole Diarrhea     Strawberry Extract      Azithromycin Itching     [See intradermal skin testing results from 8/2/2019]     Bactrim [Sulfamethoxazole-Trimethoprim] Other (See Comments)     Numb mouth, tingling lips (treated with anti-histamines)     Cephalosporins Itching     [See intradermal skin testing results from 8/2/2019]     Ciprofloxacin Hcl Other (See Comments) and Dizziness     Insomnia, mood lability, Irregular heart beat          Doxycycline Itching and Unknown     [See intradermal skin testing results from 8/2/2019]     Lisinopril Cough     Omeprazole Itching     Tigecycline Other (See Comments)     Perioral numbness in 2025 with long-term use     Tolectin [Nsaids] Rash     Tolmetin Rash and Itching     Tramadol Rash, Hives  "and Itching       PAST MEDICAL HISTORY:   Past Medical History:   Diagnosis Date     Anemia of chronic disease 10/17/2011     Anxiety      CKD (chronic kidney disease) stage 3, GFR 30-59 ml/min (H) 04/04/2012     Coccidioidomycosis, 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      Deep venous thrombosis      Diverticulosis of sigmoid colon 12/21/2013     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, history of     Received Rituxan during Summer of 2016     Glaucoma      H/O esophageal varices      Hearing loss      Hyperlipidemia 04/10/2012    Says that she does not have it anymore, not on meds     Hypertension      Hypertriglyceridemia      Liver replaced by transplant (H) 10/17/2011    Dr. 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       PAST SURGICAL HISTORY:   Past Surgical History:   Procedure Laterality Date     APPENDECTOMY  1961     CATARACT IOL, RT/LT      RE12/19/2013, LE12/10/2013 - Toric lenses     CHOLECYSTECTOMY  1991     COLECTOMY WITH COLOSTOMY, COMBINED N/A 5/29/2025    Procedure: Open sigmoidectomy with end colostmy;  Surgeon: Al Campbell MD;  Location: [facility] OR     COLONOSCOPY  03/10/2014    Procedure: COLONOSCOPY;;  Surgeon: Gentry Ramirez MD;  Location:  GI     CYSTOSCOPY       ear drum repair       ENDOBRONCHIAL " ULTRASOUND FLEXIBLE N/A 2017    Procedure: ENDOBRONCHIAL ULTRASOUND FLEXIBLE;  Flexible Bronchoscopy, Endobronchial Ultrasound, Transbronchial Needle Aspiration ;  Surgeon: Eden Clinton MD;  Location: [facility] OR     ENDOSCOPIC RETROGRADE CHOLANGIOPANCREATOGRAM  2013    Procedure: ENDOSCOPIC RETROGRADE CHOLANGIOPANCREATOGRAM;  Endoscopic Retrograde Cholangiopancreatogram with single balloon enteroscopy, ballon sweep of bile duct;  Surgeon: Brett Membreno MD;  Location: [facility] OR     HC KNEE SCOPE,MED/LAT MENISECTOMY Right 08/10/2012    partial medial menisectomy only     INSERT STENT URETER Bilateral 2025    Procedure: bilateral Insert and removal stent ureter, cystoscopy;  Surgeon: David Leung MD;  Location: [facility] OR     KNEE SURGERY  1966    R knee     PICC INSERTION  2013    4fr SL PASV PICC, 40cm (1cm external) in the R basilic vein w/ tip in the low SVC     PICC INSERTION  2014    5 fr DL BioFlo Navilyst PICC, 46 cm (3 cm external) in the L basilic vein w/ tip in the SVC RA junction.     THYROIDECTOMY  2010     TRANSPLANT LIVER RECIPIENT LIVING UNRELATED  2002       FAMILY HISTORY:   Family History   Problem Relation Age of Onset     Hypertension Mother      Endometrial Cancer Mother      Hyperlipidemia Mother      Prostate Cancer Father      Macular Degeneration Father      Cancer - colorectal Maternal Grandmother         in her 80's, has surgery and removal of part of kidney,  at age 98     Heart Disease Maternal Grandfather          at 98     Glaucoma Maternal Grandfather      Cerebrovascular Disease Paternal Grandmother         in her 80's     Hypertension Paternal Grandmother      Heart Disease Paternal Grandfather         MI     Alzheimer Disease Paternal Grandfather      Allergies Son      Neurologic Disorder Daughter         Migraines     Breast Cancer Other      Anesthesia Reaction No family hx of      Crohn's Disease No family hx of      Ulcerative Colitis No  family hx of      Melanoma No family hx of      Skin Cancer No family hx of      SOCIAL HISTORY:   [place]     MEDICATIONS:  Post Discharge Medication Reconciliation Status: discharge medications reconciled and changed, per note/orders.  Current Outpatient Medications   Medication Sig Dispense Refill     acetaminophen (TYLENOL) 500 MG tablet Take 1,000 mg by mouth 2 times daily. During 4/16/25 med recc pt states take BID everyday       albuterol (PROAIR HFA/PROVENTIL HFA/VENTOLIN HFA) 108 (90 Base) MCG/ACT inhaler Inhale 2 puffs into the lungs every 4 hours as needed for shortness of breath, wheezing or cough. 18 g 0     apixaban ANTICOAGULANT (ELIQUIS) 2.5 MG tablet Take 1 tablet (2.5 mg) by mouth 2 times daily. 60 tablet 0     calcitRIOL (ROCALTROL) 0.25 MCG capsule Take 1 capsule (0.25 mcg) by mouth daily. 90 capsule 1     calcium carbonate (TUMS) 500 MG chewable tablet Take 2 tablets (1,000 mg) by mouth every 4 hours as needed for heartburn.       D-MANNOSE PO Take 1 Scoop by mouth 2 times daily.       diphenhydrAMINE (BENADRYL) 25 MG tablet Take 25 mg by mouth every 6 hours as needed for itching or allergies.       EPINEPHrine (ANY BX GENERIC EQUIV) 0.3 MG/0.3ML injection 2-pack Inject 0.3 mLs (0.3 mg) into the muscle as needed for anaphylaxis. May repeat one time in 5-15 minutes if response to initial dose is inadequate. 2 each 1     estradiol (ESTRACE) 0.1 MG/GM vaginal cream Place vaginally three times a week.       evolocumab (REPATHA SURECLICK) 140 MG/ML prefilled autoinjector Inject 1 mL (140 mg) subcutaneously every 14 days. 6 mL 3     ezetimibe (ZETIA) 10 MG tablet Take 1 tablet (10 mg) by mouth daily. 90 tablet 3     Ferrous Sulfate 324 MG TBEC Take 1 tablet by mouth daily.       folic acid (FOLVITE) 1 MG tablet TAKE 1 TABLET BY MOUTH DAILY 90 tablet 3     gabapentin (NEURONTIN) 250 MG/5ML solution Take 6 mLs (300 mg) by mouth at bedtime 180 mL 0     glucose (BD  GLUCOSE) 5 g chewable tablet Take 2 tablets (10 g) by mouth as needed (low blood sugar) 40 tablet 1     glucose 40 % (400 mg/mL) gel Take 15 g by mouth every 15 minutes as needed for low blood sugar.       levothyroxine (SYNTHROID/LEVOTHROID) 175 MCG tablet Take 1 tablet (175 mcg) by mouth daily. 90 tablet 1     linagliptin (TRADJENTA) 5 MG TABS tablet Take 1 tablet (5 mg) by mouth daily. 90 tablet 1     Multiple Vitamins-Minerals (PRESERVISION AREDS 2) CAPS Take 1 capsule by mouth 2 times daily       Nutrisource Fiber PO packet Take 1 packet by mouth daily. Benefiber       omega-3 acid ethyl esters (LOVAZA) 1 g capsule Take 1 capsule (1 g) by mouth 2 times daily.       ondansetron (ZOFRAN) 4 MG tablet Take 1 tablet (4 mg) by mouth every 6 hours as needed for nausea.       oxymetazoline (AFRIN) 0.05 % nasal spray Spray 0.2 mLs (2 sprays) into both nostrils 2 times daily as needed for congestion. 30 mL 0     predniSONE (DELTASONE) 1 MG tablet Take 4 tablets (4 mg) by mouth daily. Take 4mg (1mg tablets x4) and Take with 5mg tablet to equal 9mg daily 90 tablet 0     predniSONE (DELTASONE) 5 MG tablet Take 1 tablet (5 mg) by mouth daily. Take with 5mg tablet with 4mg (1mg tablets x4) to equal 9mg daily 30 tablet 0     sertraline (ZOLOFT) 50 MG tablet Take 1 tablet (50 mg) by mouth daily. 30 tablet 0     tacrolimus (GENERIC) 1 mg/mL suspension Take 2 mLs (2 mg) by mouth 2 times daily. 120 mL 11     triamcinolone (KENALOG) 0.1 % external ointment Apply topically 2 times daily.       ursodiol (ACTIGALL) 250 MG tablet Take 1 tablet (250 mg) by mouth 2 times daily. 180 tablet 3     vitamin A 3 MG (76268 UNITS) capsule Take 2 capsules (20,000 Units) by mouth daily for 18 days.       Continuous Glucose Sensor (FREESTYLE NINO 2 SENSOR) MISC Change every 14 days. 2 each 5     Glucagon (GVOKE HYPOPEN) 1 MG/0.2ML pen Inject the contents of 1 device under the skin into lower abdomen, outer thigh, or outer upper arm as needed for  "hypoglycemia. If no response after 15 minutes, additional 1 mg dose from a new device may be injected while waiting for emergency assistance.         ROS:  4 point ROS neg other than the symptoms noted above in the HPI.    PHYSICAL EXAM:  /81   Pulse 88   Temp (!) 94.5  F (34.7  C)   Resp 18   Ht 1.737 m (5' 8.4\")   Wt 82.1 kg (181 lb)   LMP 06/01/1988 (Approximate)   SpO2 94%   BMI 27.20 kg/m    Gen: sitting up in bed, alert, cooperative and in no acute distress  Card: RRR, S1, S2, no murmurs  Resp: lungs clear to auscultation bilaterally anteriorly and laterally, no crackles or wheezes; moving good air  Ext: no LE edema  Neuro: CX II-XII grossly in tact; ROM in all four extremities grossly in tact  Psych: alert and oriented x3; normal affect      LABORATORY/IMAGING DATA:  Reviewed as per [facility] and/or [facility]    ASSESSMENT/PLAN:    Diverticulitis s/p Open Sigmoidectomy and End Colostomy (5/30/25)  This was in setting of recent sigmoid microperforation intra-abdominal infection.  Reports no belly pain today.  --Routine colostomy cares  --Follow-up with colorectal surgery as scheduled     CAD, HTN  SBPs 110s. HR 60s-80s. Weight 188 --> 185 --> 181 in setting of IVF resuscitation/sepsis.  PTA Amlodipine 5 mg daily, hydralazine 50 mg TID, metoprolol XL 25 mg daily all remain on hold due to hypotension after critical illness/sepsis.  -- Ezetimibe 10 mg daily, fish oil BID   -- follow BPs and add back anti-HTN medications as needed - no indication to do so today     Paroxysmal Atrial Fibrillation  Hx CVA  HR 60s-80s.  PTA Metoprolol XL 25 mg daily remains on hold (see above)  -- Apixaban 2.5 mg BID     DM, Type II  Hgb A1c 6.8 in April. Sugars 70s-90s (am) and 140s--160s (cait)  -- Linagliptin 5 mg daily  -- Follow sugars PRN     PBC s/p Liver Transplant (2002)  -- evolocumab 140 mg subQ q14d  -- prednisone 9 mg daily   -- tacrolimus 2 mg BID  -- ursodiol 250 mg  BID      Cognitive Impairment  SLUMS " 26/30 during recent TCU stay. Lives in [facility].   -- OT following     Recurrent ESBL UTIs  -- D-Mannose BID     Major Recurrent Depression  Was seen by psychiatry during initial hospitalization and started on sertraline.  Mood and spirits were good today  -- Sertraline 50 mg daily  -- supportive cares      Chronic Shoulder Pain  -- APAP 1000 mg BID, gabapentin 300 mg at bedtime     Acute on Chronic Fe Deficiency Anemia  Baseline Hgb 9-10. Hgb recently has been in 7s in setting of critical illness. Hgb stable at 7.5 today.   -- FeSO4 324 mg daily, folic acid 1 mg daily  -- would not follow Hgb not more often than a19-23faox unless signs of bleeding or hypotension     CKD, Stage III  ATN, Resolved   Baseline Cr 1.3-1.8. Cr 1.86 today. Lytes OK.   -- Calcitriol 0.25 mcg daily  -- periodic BMP      Hypothyroidisim  TSH 1.72 in April.   -- levothyroxine 175 mcg daily      Physical Deconditioning  In setting of hospitalization and underlying medical conditions  -- ongoing PT/OT       Electronically signed by:  Gloria Almaraz MD                          Sincerely,        Gloria Almaraz MD    Electronically signed

## 2025-06-22 ENCOUNTER — APPOINTMENT (OUTPATIENT)
Dept: GENERAL RADIOLOGY | Facility: CLINIC | Age: 75
DRG: 309 | End: 2025-06-22
Attending: EMERGENCY MEDICINE
Payer: MEDICARE

## 2025-06-22 ENCOUNTER — HOSPITAL ENCOUNTER (INPATIENT)
Facility: CLINIC | Age: 75
LOS: 3 days | Discharge: HOME | DRG: 309 | End: 2025-06-25
Attending: EMERGENCY MEDICINE | Admitting: HOSPITALIST
Payer: MEDICARE

## 2025-06-22 DIAGNOSIS — E87.5 HYPERKALEMIA: ICD-10-CM

## 2025-06-22 DIAGNOSIS — Z71.89 OSTOMY NURSE CONSULTATION: Primary | ICD-10-CM

## 2025-06-22 DIAGNOSIS — I48.91 ATRIAL FIBRILLATION WITH RVR (H): ICD-10-CM

## 2025-06-22 DIAGNOSIS — N18.9 CHRONIC KIDNEY DISEASE, UNSPECIFIED CKD STAGE: ICD-10-CM

## 2025-06-22 DIAGNOSIS — R07.9 CHEST PAIN, UNSPECIFIED TYPE: ICD-10-CM

## 2025-06-22 LAB
ACANTHOCYTES BLD QL SMEAR: SLIGHT
ALBUMIN SERPL BCG-MCNC: 3.1 G/DL (ref 3.5–5.2)
ALP SERPL-CCNC: 135 U/L (ref 40–150)
ALT SERPL W P-5'-P-CCNC: 27 U/L (ref 0–50)
ANION GAP SERPL CALCULATED.3IONS-SCNC: 14 MMOL/L (ref 7–15)
ANION GAP SERPL CALCULATED.3IONS-SCNC: 14 MMOL/L (ref 7–15)
AST SERPL W P-5'-P-CCNC: 23 U/L (ref 0–45)
BASOPHILS # BLD MANUAL: 0 10E3/UL (ref 0–0.2)
BASOPHILS NFR BLD MANUAL: 0 %
BILIRUB SERPL-MCNC: 0.2 MG/DL
BUN SERPL-MCNC: 41 MG/DL (ref 8–23)
BUN SERPL-MCNC: 41 MG/DL (ref 8–23)
CALCIUM SERPL-MCNC: 8.9 MG/DL (ref 8.8–10.4)
CALCIUM SERPL-MCNC: 8.9 MG/DL (ref 8.8–10.4)
CHLORIDE SERPL-SCNC: 104 MMOL/L (ref 98–107)
CHLORIDE SERPL-SCNC: 104 MMOL/L (ref 98–107)
CREAT SERPL-MCNC: 1.68 MG/DL (ref 0.51–0.95)
CREAT SERPL-MCNC: 1.68 MG/DL (ref 0.51–0.95)
EGFRCR SERPLBLD CKD-EPI 2021: 31 ML/MIN/1.73M2
EGFRCR SERPLBLD CKD-EPI 2021: 31 ML/MIN/1.73M2
ELLIPTOCYTES BLD QL SMEAR: SLIGHT
EOSINOPHIL # BLD MANUAL: 0.1 10E3/UL (ref 0–0.7)
EOSINOPHIL NFR BLD MANUAL: 1 %
ERYTHROCYTE [DISTWIDTH] IN BLOOD BY AUTOMATED COUNT: 20.2 % (ref 10–15)
FRAGMENTS BLD QL SMEAR: SLIGHT
GLUCOSE SERPL-MCNC: 180 MG/DL (ref 70–99)
GLUCOSE SERPL-MCNC: 180 MG/DL (ref 70–99)
HCO3 SERPL-SCNC: 19 MMOL/L (ref 22–29)
HCO3 SERPL-SCNC: 19 MMOL/L (ref 22–29)
HCT VFR BLD AUTO: 28.5 % (ref 35–47)
HGB BLD-MCNC: 8.5 G/DL (ref 11.7–15.7)
LYMPHOCYTES # BLD MANUAL: 1.7 10E3/UL (ref 0.8–5.3)
LYMPHOCYTES NFR BLD MANUAL: 16 %
MAGNESIUM SERPL-MCNC: 2 MG/DL (ref 1.7–2.3)
MCH RBC QN AUTO: 28.6 PG (ref 26.5–33)
MCHC RBC AUTO-ENTMCNC: 29.8 G/DL (ref 31.5–36.5)
MCV RBC AUTO: 96 FL (ref 78–100)
METAMYELOCYTES # BLD MANUAL: 0.2 10E3/UL
METAMYELOCYTES NFR BLD MANUAL: 2 %
MONOCYTES # BLD MANUAL: 0.7 10E3/UL (ref 0–1.3)
MONOCYTES NFR BLD MANUAL: 7 %
MYELOCYTES # BLD MANUAL: 0.4 10E3/UL
MYELOCYTES NFR BLD MANUAL: 3 %
NEUTROPHILS # BLD MANUAL: 7.4 10E3/UL (ref 1.6–8.3)
NEUTROPHILS NFR BLD MANUAL: 71 %
PLAT MORPH BLD: ABNORMAL
PLATELET # BLD AUTO: 632 10E3/UL (ref 150–450)
POLYCHROMASIA BLD QL SMEAR: SLIGHT
POTASSIUM SERPL-SCNC: 5.5 MMOL/L (ref 3.4–5.3)
POTASSIUM SERPL-SCNC: 5.5 MMOL/L (ref 3.4–5.3)
PROT SERPL-MCNC: 6.1 G/DL (ref 6.4–8.3)
RBC # BLD AUTO: 2.97 10E6/UL (ref 3.8–5.2)
RBC MORPH BLD: ABNORMAL
SODIUM SERPL-SCNC: 137 MMOL/L (ref 135–145)
SODIUM SERPL-SCNC: 137 MMOL/L (ref 135–145)
TROPONIN T SERPL HS-MCNC: 54 NG/L
TROPONIN T SERPL HS-MCNC: 54 NG/L
TSH SERPL DL<=0.005 MIU/L-ACNC: 4.16 UIU/ML (ref 0.3–4.2)
WBC # BLD AUTO: 10.5 10E3/UL (ref 4–11)

## 2025-06-22 PROCEDURE — 250N000009 HC RX 250: Performed by: EMERGENCY MEDICINE

## 2025-06-22 PROCEDURE — 258N000003 HC RX IP 258 OP 636: Performed by: EMERGENCY MEDICINE

## 2025-06-22 PROCEDURE — 85018 HEMOGLOBIN: CPT | Performed by: EMERGENCY MEDICINE

## 2025-06-22 PROCEDURE — 83735 ASSAY OF MAGNESIUM: CPT | Performed by: EMERGENCY MEDICINE

## 2025-06-22 PROCEDURE — 93010 ELECTROCARDIOGRAM REPORT: CPT | Performed by: EMERGENCY MEDICINE

## 2025-06-22 PROCEDURE — 84443 ASSAY THYROID STIM HORMONE: CPT | Performed by: EMERGENCY MEDICINE

## 2025-06-22 PROCEDURE — 99285 EMERGENCY DEPT VISIT HI MDM: CPT | Performed by: EMERGENCY MEDICINE

## 2025-06-22 PROCEDURE — 250N000011 HC RX IP 250 OP 636: Performed by: EMERGENCY MEDICINE

## 2025-06-22 PROCEDURE — 120N000002 HC R&B MED SURG/OB UMMC

## 2025-06-22 PROCEDURE — 85007 BL SMEAR W/DIFF WBC COUNT: CPT | Performed by: EMERGENCY MEDICINE

## 2025-06-22 PROCEDURE — 71046 X-RAY EXAM CHEST 2 VIEWS: CPT | Mod: 26 | Performed by: RADIOLOGY

## 2025-06-22 PROCEDURE — 86140 C-REACTIVE PROTEIN: CPT

## 2025-06-22 PROCEDURE — 71046 X-RAY EXAM CHEST 2 VIEWS: CPT

## 2025-06-22 PROCEDURE — 96376 TX/PRO/DX INJ SAME DRUG ADON: CPT | Performed by: EMERGENCY MEDICINE

## 2025-06-22 PROCEDURE — 96374 THER/PROPH/DIAG INJ IV PUSH: CPT | Performed by: EMERGENCY MEDICINE

## 2025-06-22 PROCEDURE — 36415 COLL VENOUS BLD VENIPUNCTURE: CPT | Performed by: EMERGENCY MEDICINE

## 2025-06-22 PROCEDURE — 84484 ASSAY OF TROPONIN QUANT: CPT | Performed by: EMERGENCY MEDICINE

## 2025-06-22 PROCEDURE — 82310 ASSAY OF CALCIUM: CPT | Performed by: EMERGENCY MEDICINE

## 2025-06-22 PROCEDURE — 99285 EMERGENCY DEPT VISIT HI MDM: CPT | Mod: 25 | Performed by: EMERGENCY MEDICINE

## 2025-06-22 PROCEDURE — 93005 ELECTROCARDIOGRAM TRACING: CPT | Performed by: EMERGENCY MEDICINE

## 2025-06-22 RX ORDER — DILTIAZEM HCL/D5W 125 MG/125
5-15 PLASTIC BAG, INJECTION (ML) INTRAVENOUS CONTINUOUS
Status: DISCONTINUED | OUTPATIENT
Start: 2025-06-22 | End: 2025-06-22

## 2025-06-22 RX ORDER — DILTIAZEM HYDROCHLORIDE 5 MG/ML
15 INJECTION INTRAVENOUS ONCE
Status: COMPLETED | OUTPATIENT
Start: 2025-06-22 | End: 2025-06-22

## 2025-06-22 RX ADMIN — DILTIAZEM HYDROCHLORIDE 5 MG/HR: 5 INJECTION, SOLUTION INTRAVENOUS at 23:46

## 2025-06-22 RX ADMIN — DILTIAZEM HYDROCHLORIDE 15 MG: 5 INJECTION INTRAVENOUS at 22:21

## 2025-06-22 RX ADMIN — SODIUM CHLORIDE 500 ML: 0.9 INJECTION, SOLUTION INTRAVENOUS at 23:50

## 2025-06-22 ASSESSMENT — ACTIVITIES OF DAILY LIVING (ADL)
ADLS_ACUITY_SCORE: 59
ADLS_ACUITY_SCORE: 59

## 2025-06-23 ENCOUNTER — APPOINTMENT (OUTPATIENT)
Dept: CARDIOLOGY | Facility: CLINIC | Age: 75
DRG: 309 | End: 2025-06-23
Payer: MEDICARE

## 2025-06-23 ENCOUNTER — TELEPHONE (OUTPATIENT)
Dept: TRANSPLANT | Facility: CLINIC | Age: 75
End: 2025-06-23
Payer: MEDICARE

## 2025-06-23 DIAGNOSIS — N39.0 URINARY TRACT INFECTION: ICD-10-CM

## 2025-06-23 LAB
ALBUMIN SERPL BCG-MCNC: 3.2 G/DL (ref 3.5–5.2)
ALP SERPL-CCNC: 124 U/L (ref 40–150)
ALT SERPL W P-5'-P-CCNC: 26 U/L (ref 0–50)
ANION GAP SERPL CALCULATED.3IONS-SCNC: 12 MMOL/L (ref 7–15)
AST SERPL W P-5'-P-CCNC: 17 U/L (ref 0–45)
ATRIAL RATE - MUSE: NORMAL BPM
BASOPHILS # BLD MANUAL: 0.2 10E3/UL (ref 0–0.2)
BASOPHILS NFR BLD MANUAL: 2 %
BILIRUB SERPL-MCNC: 0.2 MG/DL
BUN SERPL-MCNC: 38 MG/DL (ref 8–23)
BURR CELLS BLD QL SMEAR: SLIGHT
CALCIUM SERPL-MCNC: 8.7 MG/DL (ref 8.8–10.4)
CHLORIDE SERPL-SCNC: 107 MMOL/L (ref 98–107)
CREAT SERPL-MCNC: 1.56 MG/DL (ref 0.51–0.95)
CRP SERPL-MCNC: 14.1 MG/L
DIASTOLIC BLOOD PRESSURE - MUSE: NORMAL MMHG
EGFRCR SERPLBLD CKD-EPI 2021: 34 ML/MIN/1.73M2
EOSINOPHIL # BLD MANUAL: 0.2 10E3/UL (ref 0–0.7)
EOSINOPHIL NFR BLD MANUAL: 2 %
ERYTHROCYTE [DISTWIDTH] IN BLOOD BY AUTOMATED COUNT: 20.1 % (ref 10–15)
GLUCOSE BLDC GLUCOMTR-MCNC: 140 MG/DL (ref 70–99)
GLUCOSE BLDC GLUCOMTR-MCNC: 160 MG/DL (ref 70–99)
GLUCOSE BLDC GLUCOMTR-MCNC: 73 MG/DL (ref 70–99)
GLUCOSE BLDC GLUCOMTR-MCNC: 83 MG/DL (ref 70–99)
GLUCOSE BLDC GLUCOMTR-MCNC: 87 MG/DL (ref 70–99)
GLUCOSE SERPL-MCNC: 89 MG/DL (ref 70–99)
HCO3 SERPL-SCNC: 22 MMOL/L (ref 22–29)
HCT VFR BLD AUTO: 27.2 % (ref 35–47)
HGB BLD-MCNC: 8.1 G/DL (ref 11.7–15.7)
INR PPP: 1.4 (ref 0.85–1.15)
INTERPRETATION ECG - MUSE: NORMAL
LVEF ECHO: NORMAL
LYMPHOCYTES # BLD MANUAL: 2.2 10E3/UL (ref 0.8–5.3)
LYMPHOCYTES NFR BLD MANUAL: 22 %
MAGNESIUM SERPL-MCNC: 2 MG/DL (ref 1.7–2.3)
MCH RBC QN AUTO: 28.9 PG (ref 26.5–33)
MCHC RBC AUTO-ENTMCNC: 29.8 G/DL (ref 31.5–36.5)
MCV RBC AUTO: 97 FL (ref 78–100)
MONOCYTES # BLD MANUAL: 0.9 10E3/UL (ref 0–1.3)
MONOCYTES NFR BLD MANUAL: 9 %
MYELOCYTES # BLD MANUAL: 0.5 10E3/UL
MYELOCYTES NFR BLD MANUAL: 5 %
NEUTROPHILS # BLD MANUAL: 6.2 10E3/UL (ref 1.6–8.3)
NEUTROPHILS NFR BLD MANUAL: 61 %
NRBC # BLD AUTO: 0.4 10E3/UL
NRBC BLD MANUAL-RTO: 3 %
P AXIS - MUSE: NORMAL DEGREES
PLAT MORPH BLD: ABNORMAL
PLATELET # BLD AUTO: 638 10E3/UL (ref 150–450)
POLYCHROMASIA BLD QL SMEAR: ABNORMAL
POTASSIUM SERPL-SCNC: 4.6 MMOL/L (ref 3.4–5.3)
PR INTERVAL - MUSE: NORMAL MS
PROT SERPL-MCNC: 6.1 G/DL (ref 6.4–8.3)
PROTHROMBIN TIME: 17.4 SECONDS (ref 11.8–14.8)
QRS DURATION - MUSE: 70 MS
QT - MUSE: 306 MS
QTC - MUSE: 463 MS
R AXIS - MUSE: 26 DEGREES
RBC # BLD AUTO: 2.8 10E6/UL (ref 3.8–5.2)
RBC MORPH BLD: ABNORMAL
SODIUM SERPL-SCNC: 141 MMOL/L (ref 135–145)
SYSTOLIC BLOOD PRESSURE - MUSE: NORMAL MMHG
T AXIS - MUSE: 265 DEGREES
TACROLIMUS BLD-MCNC: 3.2 UG/L (ref 5–15)
TME LAST DOSE: ABNORMAL H
TME LAST DOSE: ABNORMAL H
VARIANT LYMPHS BLD QL SMEAR: PRESENT
VENTRICULAR RATE- MUSE: 138 BPM
WBC # BLD AUTO: 10.2 10E3/UL (ref 4–11)

## 2025-06-23 PROCEDURE — 85007 BL SMEAR W/DIFF WBC COUNT: CPT

## 2025-06-23 PROCEDURE — 36415 COLL VENOUS BLD VENIPUNCTURE: CPT

## 2025-06-23 PROCEDURE — 93321 DOPPLER ECHO F-UP/LMTD STD: CPT | Mod: 26 | Performed by: INTERNAL MEDICINE

## 2025-06-23 PROCEDURE — 250N000012 HC RX MED GY IP 250 OP 636 PS 637: Performed by: HOSPITALIST

## 2025-06-23 PROCEDURE — 93308 TTE F-UP OR LMTD: CPT | Mod: 26 | Performed by: INTERNAL MEDICINE

## 2025-06-23 PROCEDURE — 120N000002 HC R&B MED SURG/OB UMMC

## 2025-06-23 PROCEDURE — 85610 PROTHROMBIN TIME: CPT

## 2025-06-23 PROCEDURE — 250N000012 HC RX MED GY IP 250 OP 636 PS 637

## 2025-06-23 PROCEDURE — 85018 HEMOGLOBIN: CPT

## 2025-06-23 PROCEDURE — 250N000013 HC RX MED GY IP 250 OP 250 PS 637: Performed by: PEDIATRICS

## 2025-06-23 PROCEDURE — 83735 ASSAY OF MAGNESIUM: CPT

## 2025-06-23 PROCEDURE — 93325 DOPPLER ECHO COLOR FLOW MAPG: CPT | Mod: 26 | Performed by: INTERNAL MEDICINE

## 2025-06-23 PROCEDURE — 80197 ASSAY OF TACROLIMUS: CPT

## 2025-06-23 PROCEDURE — 82962 GLUCOSE BLOOD TEST: CPT

## 2025-06-23 PROCEDURE — 99222 1ST HOSP IP/OBS MODERATE 55: CPT | Mod: 24 | Performed by: NURSE PRACTITIONER

## 2025-06-23 PROCEDURE — 99223 1ST HOSP IP/OBS HIGH 75: CPT | Mod: GC | Performed by: PEDIATRICS

## 2025-06-23 PROCEDURE — 258N000003 HC RX IP 258 OP 636

## 2025-06-23 PROCEDURE — 250N000013 HC RX MED GY IP 250 OP 250 PS 637

## 2025-06-23 PROCEDURE — 93325 DOPPLER ECHO COLOR FLOW MAPG: CPT

## 2025-06-23 PROCEDURE — 80053 COMPREHEN METABOLIC PANEL: CPT

## 2025-06-23 RX ORDER — VITAMIN A 3000 MCG
20000 CAPSULE ORAL DAILY
Status: DISCONTINUED | OUTPATIENT
Start: 2025-06-23 | End: 2025-06-25 | Stop reason: HOSPADM

## 2025-06-23 RX ORDER — FOLIC ACID 1 MG/1
1000 TABLET ORAL DAILY
Status: DISCONTINUED | OUTPATIENT
Start: 2025-06-23 | End: 2025-06-25 | Stop reason: HOSPADM

## 2025-06-23 RX ORDER — PREDNISONE 5 MG/1
5 TABLET ORAL DAILY
Status: DISCONTINUED | OUTPATIENT
Start: 2025-06-23 | End: 2025-06-23

## 2025-06-23 RX ORDER — LIDOCAINE 40 MG/G
CREAM TOPICAL
Status: DISCONTINUED | OUTPATIENT
Start: 2025-06-23 | End: 2025-06-25 | Stop reason: HOSPADM

## 2025-06-23 RX ORDER — VIT C/E/ZN/COPPR/LUTEIN/ZEAXAN 60 MG-6 MG
1 CAPSULE ORAL 2 TIMES DAILY
Status: DISCONTINUED | OUTPATIENT
Start: 2025-06-23 | End: 2025-06-25 | Stop reason: HOSPADM

## 2025-06-23 RX ORDER — METOPROLOL TARTRATE 25 MG/1
25 TABLET, FILM COATED ORAL 2 TIMES DAILY
Status: DISCONTINUED | OUTPATIENT
Start: 2025-06-23 | End: 2025-06-23

## 2025-06-23 RX ORDER — ALBUTEROL SULFATE 90 UG/1
2 INHALANT RESPIRATORY (INHALATION) EVERY 4 HOURS PRN
Status: DISCONTINUED | OUTPATIENT
Start: 2025-06-23 | End: 2025-06-25 | Stop reason: HOSPADM

## 2025-06-23 RX ORDER — METOPROLOL TARTRATE 25 MG/1
25 TABLET, FILM COATED ORAL 2 TIMES DAILY
Status: COMPLETED | OUTPATIENT
Start: 2025-06-23 | End: 2025-06-23

## 2025-06-23 RX ORDER — URSODIOL 250 MG/1
250 TABLET, FILM COATED ORAL 2 TIMES DAILY
Status: DISCONTINUED | OUTPATIENT
Start: 2025-06-23 | End: 2025-06-25 | Stop reason: HOSPADM

## 2025-06-23 RX ORDER — TRIAMCINOLONE ACETONIDE 1 MG/G
OINTMENT TOPICAL 2 TIMES DAILY
Status: DISCONTINUED | OUTPATIENT
Start: 2025-06-23 | End: 2025-06-25 | Stop reason: HOSPADM

## 2025-06-23 RX ORDER — AMOXICILLIN 250 MG
1 CAPSULE ORAL 2 TIMES DAILY PRN
Status: DISCONTINUED | OUTPATIENT
Start: 2025-06-23 | End: 2025-06-25 | Stop reason: HOSPADM

## 2025-06-23 RX ORDER — EZETIMIBE 10 MG/1
10 TABLET ORAL DAILY
Status: DISCONTINUED | OUTPATIENT
Start: 2025-06-23 | End: 2025-06-25 | Stop reason: HOSPADM

## 2025-06-23 RX ORDER — CALCIUM CARBONATE 500 MG/1
1000 TABLET, CHEWABLE ORAL 4 TIMES DAILY PRN
Status: DISCONTINUED | OUTPATIENT
Start: 2025-06-23 | End: 2025-06-23

## 2025-06-23 RX ORDER — ACETAMINOPHEN 500 MG
1000 TABLET ORAL 2 TIMES DAILY
Status: DISCONTINUED | OUTPATIENT
Start: 2025-06-23 | End: 2025-06-25 | Stop reason: HOSPADM

## 2025-06-23 RX ORDER — GUAR GUM
1 PACKET (EA) ORAL DAILY
Status: DISCONTINUED | OUTPATIENT
Start: 2025-06-23 | End: 2025-06-25 | Stop reason: HOSPADM

## 2025-06-23 RX ORDER — CALCITRIOL 0.25 UG/1
0.25 CAPSULE, LIQUID FILLED ORAL DAILY
Status: DISCONTINUED | OUTPATIENT
Start: 2025-06-23 | End: 2025-06-25 | Stop reason: HOSPADM

## 2025-06-23 RX ORDER — DEXTROSE MONOHYDRATE 25 G/50ML
25-50 INJECTION, SOLUTION INTRAVENOUS
Status: DISCONTINUED | OUTPATIENT
Start: 2025-06-23 | End: 2025-06-25 | Stop reason: HOSPADM

## 2025-06-23 RX ORDER — POLYETHYLENE GLYCOL 3350 17 G/17G
17 POWDER, FOR SOLUTION ORAL 2 TIMES DAILY PRN
Status: DISCONTINUED | OUTPATIENT
Start: 2025-06-23 | End: 2025-06-25 | Stop reason: HOSPADM

## 2025-06-23 RX ORDER — GABAPENTIN 250 MG/5ML
300 SOLUTION ORAL AT BEDTIME
Status: DISCONTINUED | OUTPATIENT
Start: 2025-06-23 | End: 2025-06-25 | Stop reason: HOSPADM

## 2025-06-23 RX ORDER — LEVOTHYROXINE SODIUM 175 UG/1
175 TABLET ORAL DAILY
Status: DISCONTINUED | OUTPATIENT
Start: 2025-06-23 | End: 2025-06-25 | Stop reason: HOSPADM

## 2025-06-23 RX ORDER — METOPROLOL SUCCINATE 25 MG/1
50 TABLET, EXTENDED RELEASE ORAL DAILY
Status: DISCONTINUED | OUTPATIENT
Start: 2025-06-24 | End: 2025-06-25 | Stop reason: HOSPADM

## 2025-06-23 RX ORDER — CALCIUM CARBONATE 500 MG/1
1000 TABLET, CHEWABLE ORAL EVERY 4 HOURS PRN
Status: DISCONTINUED | OUTPATIENT
Start: 2025-06-23 | End: 2025-06-25 | Stop reason: HOSPADM

## 2025-06-23 RX ORDER — FERROUS SULFATE 325(65) MG
325 TABLET ORAL DAILY
Status: DISCONTINUED | OUTPATIENT
Start: 2025-06-23 | End: 2025-06-25 | Stop reason: HOSPADM

## 2025-06-23 RX ORDER — AMOXICILLIN 250 MG
2 CAPSULE ORAL 2 TIMES DAILY PRN
Status: DISCONTINUED | OUTPATIENT
Start: 2025-06-23 | End: 2025-06-25 | Stop reason: HOSPADM

## 2025-06-23 RX ORDER — NICOTINE POLACRILEX 4 MG
15-30 LOZENGE BUCCAL
Status: DISCONTINUED | OUTPATIENT
Start: 2025-06-23 | End: 2025-06-25 | Stop reason: HOSPADM

## 2025-06-23 RX ADMIN — SERTRALINE HYDROCHLORIDE 50 MG: 50 TABLET ORAL at 08:07

## 2025-06-23 RX ADMIN — SODIUM CHLORIDE, SODIUM LACTATE, POTASSIUM CHLORIDE, AND CALCIUM CHLORIDE 500 ML: .6; .31; .03; .02 INJECTION, SOLUTION INTRAVENOUS at 04:15

## 2025-06-23 RX ADMIN — METOPROLOL TARTRATE 25 MG: 25 TABLET, FILM COATED ORAL at 19:17

## 2025-06-23 RX ADMIN — Medication 9 MG: at 09:09

## 2025-06-23 RX ADMIN — URSODIOL 250 MG: 250 TABLET, FILM COATED ORAL at 09:08

## 2025-06-23 RX ADMIN — GABAPENTIN 300 MG: 250 SOLUTION ORAL at 05:58

## 2025-06-23 RX ADMIN — TACROLIMUS 2 MG: 5 CAPSULE ORAL at 09:08

## 2025-06-23 RX ADMIN — TACROLIMUS 2 MG: 5 CAPSULE ORAL at 19:17

## 2025-06-23 RX ADMIN — TRIAMCINOLONE ACETONIDE: 1 OINTMENT TOPICAL at 19:17

## 2025-06-23 RX ADMIN — FOLIC ACID 1000 MCG: 1 TABLET ORAL at 08:08

## 2025-06-23 RX ADMIN — GABAPENTIN 300 MG: 250 SOLUTION ORAL at 21:30

## 2025-06-23 RX ADMIN — URSODIOL 250 MG: 250 TABLET, FILM COATED ORAL at 19:17

## 2025-06-23 RX ADMIN — LEVOTHYROXINE SODIUM 175 MCG: 0.17 TABLET ORAL at 09:08

## 2025-06-23 RX ADMIN — FERROUS SULFATE TAB 325 MG (65 MG ELEMENTAL FE) 325 MG: 325 (65 FE) TAB at 08:08

## 2025-06-23 RX ADMIN — CALCITRIOL CAPSULES 0.25 MCG 0.25 MCG: 0.25 CAPSULE ORAL at 09:08

## 2025-06-23 RX ADMIN — Medication 20000 UNITS: at 09:08

## 2025-06-23 RX ADMIN — Medication 1 CAPSULE: at 09:08

## 2025-06-23 RX ADMIN — APIXABAN 2.5 MG: 2.5 TABLET, FILM COATED ORAL at 19:17

## 2025-06-23 RX ADMIN — ACETAMINOPHEN 1000 MG: 500 TABLET ORAL at 19:17

## 2025-06-23 RX ADMIN — APIXABAN 2.5 MG: 2.5 TABLET, FILM COATED ORAL at 08:08

## 2025-06-23 RX ADMIN — Medication 1 CAPSULE: at 19:16

## 2025-06-23 RX ADMIN — METOPROLOL TARTRATE 25 MG: 25 TABLET, FILM COATED ORAL at 09:18

## 2025-06-23 RX ADMIN — INSULIN ASPART 1 UNITS: 100 INJECTION, SOLUTION INTRAVENOUS; SUBCUTANEOUS at 16:52

## 2025-06-23 RX ADMIN — EZETIMIBE 10 MG: 10 TABLET ORAL at 09:08

## 2025-06-23 ASSESSMENT — ACTIVITIES OF DAILY LIVING (ADL)
ADLS_ACUITY_SCORE: 59
ADLS_ACUITY_SCORE: 71
ADLS_ACUITY_SCORE: 75
ADLS_ACUITY_SCORE: 59
ADLS_ACUITY_SCORE: 75
ADLS_ACUITY_SCORE: 75
ADLS_ACUITY_SCORE: 71
DEPENDENT_IADLS:: CLEANING;COOKING;LAUNDRY
ADLS_ACUITY_SCORE: 71
ADLS_ACUITY_SCORE: 59
ADLS_ACUITY_SCORE: 71
ADLS_ACUITY_SCORE: 75
ADLS_ACUITY_SCORE: 75
ADLS_ACUITY_SCORE: 59
ADLS_ACUITY_SCORE: 75
ADLS_ACUITY_SCORE: 71
ADLS_ACUITY_SCORE: 75
ADLS_ACUITY_SCORE: 59
ADLS_ACUITY_SCORE: 75
ADLS_ACUITY_SCORE: 59

## 2025-06-23 NOTE — TELEPHONE ENCOUNTER
No tacro level. Patient currently in the ER. Will await plan. If paitent discharged will review with care center.

## 2025-06-23 NOTE — CONSULTS
Care Management Initial Consult    General Information  Assessment completed with: Patient,    Type of CM/SW Visit: Initial Assessment    Primary Care Provider verified and updated as needed: Yes   Readmission within the last 30 days: previous discharge plan unsuccessful   Return Category: Progression of disease  Reason for Consult: discharge planning  Advance Care Planning: Advance Care Planning Reviewed: present on chart, verified with patient, no concerns identified          Communication Assessment  Patient's communication style: spoken language (English or Bilingual)             Cognitive  Cognitive/Neuro/Behavioral:                        Living Environment:   People in home: alone     Current living Arrangements: assisted living, independent living facility (in the process of moving to assisted living.)  Name of Facility: patient could not recall   Able to return to prior arrangements: yes  Living Arrangement Comments: been staying at [facility]    Family/Social Support:  Care provided by: self, child(zaid), homecare agency  Provides care for: no one  Marital Status:   Support system: Children          Description of Support System: Supportive, Involved    Support Assessment: Adequate family and caregiver support    Current Resources:   Patient receiving home care services: No (previously was receiving home care from [facility]; however is currently staying in [facility])        Community Resources: DME, Housekeeping/Chore Agency  Equipment currently used at home: walker, rolling, grab bar, tub/shower, grab bar, toilet, shower chair  Supplies currently used at home: Other, Nutritional Supplements, Diabetic Supplies, Incontinence Supplies    Employment/Financial:  Employment Status: retired        Financial Concerns: none   Referral to Financial Worker: No       Does the patient's insurance plan have a 3 day qualifying hospital stay waiver?  No    Lifestyle & Psychosocial Needs:  Social Drivers of Health     Food  Insecurity: Low Risk  (5/30/2025)    Food Insecurity     Within the past 12 months, did you worry that your food would run out before you got money to buy more?: No     Within the past 12 months, did the food you bought just not last and you didn t have money to get more?: No   Depression: Not at risk (4/2/2025)    PHQ-2     PHQ-2 Score: 0   Housing Stability: Low Risk  (5/30/2025)    Housing Stability     Do you have housing? : Yes     Are you worried about losing your housing?: No   Tobacco Use: Medium Risk (6/18/2025)    Patient History     Smoking Tobacco Use: Former     Smokeless Tobacco Use: Never     Passive Exposure: Not on file   Financial Resource Strain: Low Risk  (5/30/2025)    Financial Resource Strain     Within the past 12 months, have you or your family members you live with been unable to get utilities (heat, electricity) when it was really needed?: No   Alcohol Use: Not At Risk (11/9/2023)    Received from [facility]    AUDIT-C     Frequency of Alcohol Consumption: Never     Average Number of Drinks: Patient does not drink     Frequency of Binge Drinking: Never   Transportation Needs: Low Risk  (5/30/2025)    Transportation Needs     Within the past 12 months, has lack of transportation kept you from medical appointments, getting your medicines, non-medical meetings or appointments, work, or from getting things that you need?: No   Physical Activity: Insufficiently Active (11/9/2023)    Received from [facility]    Exercise Vital Sign     Days of Exercise per Week: 5 days     Minutes of Exercise per Session: 10 min   Interpersonal Safety: Low Risk  (5/30/2025)    Interpersonal Safety     Do you feel physically and emotionally safe where you currently live?: Yes     Within the past 12 months, have you been hit, slapped, kicked or otherwise physically hurt by someone?: No     Within the past 12 months, have you been humiliated or emotionally abused in other ways by your partner or ex-partner?: No  "  Stress: No Stress Concern Present (11/9/2023)    Received from [employer]    [facility] - Occupational Stress Questionnaire     Feeling of Stress : Only a little   Social Connections: Feeling Socially Integrated (12/20/2023)    Received from [employer]    OASIS : Social Isolation     Frequency of experiencing loneliness or isolation: Never   Health Literacy: Not on file       Functional Status:  Prior to admission patient needed assistance:   Dependent ADLs:: Ambulation-cane, Ambulation-walker, Incontinence  Dependent IADLs:: Cleaning, Cooking, Laundry  Assesssment of Functional Status: Not at baseline with ADL Functioning, Not at baseline with mobility    Mental Health Status:  Mental Health Status: No Current Concerns  Mental Health Management: Medication    Chemical Dependency Status:  Chemical Dependency Status: No Current Concerns             Values/Beliefs:  Spiritual, Cultural Beliefs, Mandaeism Practices, Values that affect care: no               Discussed  Partnership in Safe Discharge Planning  document with patient/family: No    Additional Information:  Care management assessment completed due to elevated risk score. Writer (NADEEM) met with patient at bedside; introduced self and purpose of assessment. Patient was agreeable to meeting and answering questions. Patient states she has been staying at     [facility]  [address] [address]  P: [phone]  Facility Adm P: [phone]  Facility Adm F: [phone]  Adm P: [phone]    TCU - patient working on transfers and recently \"failed\" sit to stand test, so she is feeling nervous about how far she has to go with therapy. She says she did request a bed hold when she came to the hospital. Patient has not been home in many months; states she anticipates moving into assisted living on discharge from [facility].    Patient will need a wheelchair ride arranged when she discharges tomorrow. " Writer discussed costs associated, patient understanding of this. She needs help transferring to the wheelchair, can tolerate a seated position.    Next Steps: Follow for discharge planning - fax orders to [facility]    - arrange [facility] Ph: [phone]  Transport Type:Wheel Chair  Indication/Need: w/c to be provided by transport    Ride Date: Anticipate 6/23 NOT YET SCHEDULED   Private Pay Cost Discussed: yes  PCS Completed:N/A  Her weight is 181lb  ________________    HUMA Moore, LGSW  ED/Observation   [facility]  Phone: [phone]  Fax: [phone]    After hours [employer] and [employer]  Available from 4:00pm - 8:30p

## 2025-06-23 NOTE — ED TRIAGE NOTES
BIBA from TCU  h/o A-Fib on blood thinner  On and off A-Fib x3 weeks  Chest pain since yesterday  Per EMS EKG A-Fib   mg/dL

## 2025-06-23 NOTE — H&P
[facility]    History and Physical - Medicine Service, JOVANNY TEAM        Date of Admission:  6/22/2025    Assessment & Plan      Luz Thompson is a 75 year old female with a past medical history of atrial fibrillation, DVT on eliquis, CVA, CKD III, HFpEF (TTE 3/9/25 EF 60-65%), T2DM, biliary cirrhosis s/p liver transplant in 2002, EBV, HTN, HLD, Sjogren's syndrome, Basal Cell Carincoma s/p MOHS on 4/8/25, thyroid cancer s/p thyroidectomy in 2010, and diverticulitis s/p open sigmoidectomy and end colostomy on 5/30/25, with Pseudomonas VRE bacteremia who was admitted on 6/23/25 for evaluation of atrial fibrillation with rapid ventricular response.     # Atrial Fibrillation with Rapid Ventricular Response  #Troponin elevation likely due to demand ischemia  # Hypertension  #Hyperlipidemia  # HFpEF (TTE 6/4/25 EF 60-65%)   # history of CVA  The patient reports she typically cannot tell when she is in afib but on the day of admission she had chest pressure. Heart rate elevated at 143bpm but vital signs otherwise stable on presentation to the ED. Troponin in the ED elevated but stable at 54 on recheck and ECG with afib with RVR. TSH within normal limits. Last echo on 3/9/25 with EF of 60-65% with normal global wall motion and mild aortic insufficiency. The patient has an implanted loop recorder and on report from 3/17/25 - 6/17/25, the device recorded 8 episodes of afib with RVR, 52 bradycardic episodes, and 60 episodes of afib with the patient being in persistent afib since 6/2/25. Following her last hospitalization, her metoprolol tartrate medication was held due to concern for hypotension. Etiology of Afib with RVR possibly due to hypovolemia with new ostomy creation, hypertension with blood pressure medications held from previous hospitalization. Infection less likely as WBC within normal limits and CXR with no acute findings in the ED.   - Continue diltiazem drip     - Consider switching to metoprolol tartrate 12.5mg BID in the AM   - Give additional 500mL LR bolus   - TTE   - PTA apixaban 2.5mg BID  - PTA ezetimibe 10mg daily    - Hold PTA Amlodipine consider resuming once rate controlled  - Hold PTA Hydralazine, consider resuming once rate controlled  - Strict I/Os   - Daily standing weights  - Telemetry  - Continuous pulse oximetry  - Daily BMP and magnesium     #Hyperkalemia   On presentation to the ED, potassium was 5.5. Given 500mL of NS in the ED. Will recheck following fluid bolus. Possibly elevated in the setting of recent sigmoidectomy and ostomy creation or with CKD. Will consider shifting if remains elevated or potassium further increases.   - Daily BMP      # Diverticulitis complicated by abscess status post open sigmoidectomy with end colostomy 5/30/25  # Sepsis due to Klebsiella pneumoniae and Pseudomonas aeruginosa and VRE bacteremia, likely of intra-abdominal origin   # Hypotension due to adrenal insufficiency/sepsis   Previous admission with diverticulitis complicated by abscess which required sigmoidectomy and colostomy.  Several IV antibiotics throughout hospitalization with ID following due to pseudomonas and VRE bacteremia. Completed course of antibiotics 6/12/25. Following with infectious disease and colorectal surgery. On admission, patient not meeting SIRS criteria and low suspicion for infection.   - PTA High dose Vitamin A x 30 days post-op (through 6/30)  - Fiber packet daily   - Pain regimen:   - PTA Acetaminophen 1,000mg BID    - PTA Gabapentin 300mg at bedtime      # Primary biliary cirrhosis s/p liver transplant 2002  # EBV +  # Sjogren's syndrome  - Consult transplant hepatology in AM  - Tacrolimus level in AM  - PTA Tacrolimus 2 mg BID suspension (allergy to capsule protein)  - PTA Prednisone 9 mg daily   - PTA ursodiol 250mg BID     # Normocytic Anemia   # Thrombocytosis   On presentation to ED, hemoglobin 8.5 and platelets 632. Hemoglobin  "improving since previous admission. Elevated platelets possibly reactive. Will continue to trend.   - Daily CBC   - PTA ferrous sulfate 324mg daily   - PTA folic acid 1,000mcg daily      # CKD3  # Bilateral stent placement per urology 5/29  Her baseline creatinine is 1.3-1.6. Upon presentation to ED, creatinine is 1.68.  - Daily BMP      # Type 2 diabetes  # Stress hyperglycemia    PTA on linagliptin. Last A1c 6.3 on 5/29/25.   - Hold linigliptin 5mg daily  - MDSSI  - Hypoglycemia protocol     # Thyroid cancer status post thyroidectomy 2010  - PTA calcitriol 0.25mcg  - PTA levothyroxine 175mcg      # Weakness and deconditioning of critical illness   Living at TCU prior to admission.   - PT/OT consult    #Depression  #Anxiety   - PTA sertraline 50mg daily           Diet:  Regular diet  DVT Prophylaxis: DOAC  Ron Catheter: Not present  Fluids: 500mL LR bolus on admission  Lines: None     Cardiac Monitoring: None  Code Status:  Full code, confirmed with patient on admission    Clinically Significant Risk Factors Present on Admission        # Hyperkalemia: Highest K = 5.5 mmol/L in last 2 days, will monitor as appropriate        # Hypoalbuminemia: Lowest albumin = 3.1 g/dL at 6/22/2025  9:44 PM, will monitor as appropriate    # Drug Induced Coagulation Defect: home medication list includes an anticoagulant medication    # Hypertension: Noted on problem list      # Anemia: based on hgb <11       # Overweight: Estimated body mass index is 27.93 kg/m  as calculated from the following:    Height as of this encounter: 1.715 m (5' 7.5\").    Weight as of this encounter: 82.1 kg (181 lb).         # Financial/Environmental Concerns:           Disposition Plan      Expected Discharge Date: 06/24/2025                The patient's care staffed with attending in the AM.      Aiden Meng MD  Medicine Service, [facility]  Securely message with [employer] (more info)  Text " page via [facility] Paging/Directory   See signed in provider for up to date coverage information  ______________________________________________________________________    Chief Complaint   Chest pressure    History is obtained from the patient    History of Present Illness   Luz Thompson is a 75 year old female with a past medical history of atrial fibrillation, DVT on eliquis, CVA, CKD III, HFpEF (TTE 3/9/25 EF 60-65%), T2DM, biliary cirrhosis s/p liver transplant in 2002, EBV, HTN, HLD, Sjogren's syndrome, Basal Cell Carincoma s/p MOHS on 4/8/25, thyroid cancer s/p thyroidectomy in 2010, and diverticulitis s/p open sigmoidectomy and end colostomy on 5/30/25, with Pseudomonas VRE bacteremia who was admitted on 6/23/25 for evaluation of atrial fibrillation with rapid ventricular response.     The patient reports that on the day of admission while reading in bed she began to have a sensation of chest pressure that prompted her to present to the ED. The patient reports the chest pressure was located over the center of her chest and did not radiate. She denies any dizziness, lightheadedness, syncope, vision changes, shortness of breath, diaphoresis, nausea, or abdominal pain with the chest pressure. She reports she had episodes of this chest pressure in the past when she has atrial fibrillation with RVR. Previously if she was feeling this way she would take an additional dose of her metoprolol medication which would typically resolve the chest pressure, but metoprolol was held following her previous hospitalization. She reports she has an occasionally productive cough with brownish-yellow sputum and she had one episode of fever last week that was worked up at her TCU and later resolved on its own. She denies any headaches, sore throat, sinus pressure, nasal congestion, nausea, worsening abdominal pain, increased ostomy output, dysuria, hematuria, blood in the stool, skin rashes or leg swelling. She reports that she  "is having some problems with ostomy healing and occasional bag leakage but that her stool has been consistently soft and brown.     The patient currently resides in a TCU following her previous admission. She reports she quit smoking cigarettes in 1984. She has rare alcohol use on holidays and denies any other recreational drug use.     In the ED, the patient presented with a heart rate of 138bpm and ECG revealed she was in atrial fibrillation with RVR. Labwork significant for platelets of 632, potassium of 5.5, troponin of 54 with repeat steady at 54, and TSH of 4.16. CXR revealed no changes from previous imaging with no acute findings. She was given a 500mL NS bolus and started on a diltiazem loading dose and drip with successful rate control prior to admission.      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       Past Surgical History   Past Surgical History:   Procedure Laterality Date    APPENDECTOMY  1961    CATARACT IOL, RT/LT      RE12/19/2013, LE12/10/2013 - Toric lenses    CHOLECYSTECTOMY  1991    COLECTOMY WITH COLOSTOMY, COMBINED N/A 5/29/2025    Procedure: Open sigmoidectomy with end colostmy;  Surgeon: Al Campbell MD;  Location: [facility] OR    COLONOSCOPY  03/10/2014    Procedure: COLONOSCOPY;;  Surgeon: Gentry Ramirez MD;  Location: [facility] GI    CYSTOSCOPY      ear drum repair      ENDOBRONCHIAL ULTRASOUND FLEXIBLE N/A 09/29/2017    Procedure: ENDOBRONCHIAL ULTRASOUND FLEXIBLE;  Flexible Bronchoscopy, Endobronchial Ultrasound, Transbronchial Needle Aspiration ;  Surgeon: Eden Clinton MD;  Location: [facility] OR    ENDOSCOPIC RETROGRADE CHOLANGIOPANCREATOGRAM  09/19/2013    Procedure: ENDOSCOPIC RETROGRADE CHOLANGIOPANCREATOGRAM;  Endoscopic Retrograde Cholangiopancreatogram with single balloon enteroscopy, ballon sweep of bile duct;  Surgeon: Brett Membreno MD;  Location: [facility] OR    HC KNEE SCOPE,MED/LAT MENISECTOMY Right 08/10/2012    partial medial menisectomy only    INSERT STENT URETER Bilateral 5/29/2025    Procedure: bilateral Insert and removal stent ureter, cystoscopy;  Surgeon: David Leung MD;  Location: [facility] OR    KNEE SURGERY  1966    R knee    PICC INSERTION  09/18/2013    4fr SL PASV PICC, 40cm (1cm external) in the R basilic vein w/ tip in the low SVC    PICC INSERTION  02/21/2014    5 fr DL BioFlo Navilyst PICC, 46 cm (3 cm external) in the L basilic vein w/ tip in the SVC RA junction.    THYROIDECTOMY  03/2010    TRANSPLANT LIVER RECIPIENT LIVING UNRELATED  05/2002       Prior to Admission Medications   Prior to Admission Medications   Prescriptions Last Dose Informant Patient  Reported? Taking?   Continuous Glucose Sensor (FREESTYLE NINO 2 SENSOR) MISC  Self, Other No No   Sig: Change every 14 days.   D-MANNOSE PO  Self, Other Yes No   Sig: Take 1 Scoop by mouth 2 times daily.   EPINEPHrine (ANY BX GENERIC EQUIV) 0.3 MG/0.3ML injection 2-pack  Self, Other No No   Sig: Inject 0.3 mLs (0.3 mg) into the muscle as needed for anaphylaxis. May repeat one time in 5-15 minutes if response to initial dose is inadequate.   Ferrous Sulfate 324 MG TBEC  Self, Other Yes No   Sig: Take 1 tablet by mouth daily.   Glucagon (GVOKE HYPOPEN) 1 MG/0.2ML pen   No No   Sig: Inject the contents of 1 device under the skin into lower abdomen, outer thigh, or outer upper arm as needed for hypoglycemia. If no response after 15 minutes, additional 1 mg dose from a new device may be injected while waiting for emergency assistance.   Multiple Vitamins-Minerals (PRESERVISION AREDS 2) CAPS  Self, Other No No   Sig: Take 1 capsule by mouth 2 times daily   Nutrisource Fiber PO packet  Self, Other Yes No   Sig: Take 1 packet by mouth daily. Benefiber   acetaminophen (TYLENOL) 500 MG tablet   Yes No   Sig: Take 1,000 mg by mouth 2 times daily. During 4/16/25 med recc pt states take BID everyday   albuterol (PROAIR HFA/PROVENTIL HFA/VENTOLIN HFA) 108 (90 Base) MCG/ACT inhaler   No No   Sig: Inhale 2 puffs into the lungs every 4 hours as needed for shortness of breath, wheezing or cough.   apixaban ANTICOAGULANT (ELIQUIS) 2.5 MG tablet   No No   Sig: Take 1 tablet (2.5 mg) by mouth 2 times daily.   calcitRIOL (ROCALTROL) 0.25 MCG capsule  Self, Other No No   Sig: Take 1 capsule (0.25 mcg) by mouth daily.   calcium carbonate (TUMS) 500 MG chewable tablet   No No   Sig: Take 2 tablets (1,000 mg) by mouth every 4 hours as needed for heartburn.   diphenhydrAMINE (BENADRYL) 25 MG tablet   Yes No   Sig: Take 25 mg by mouth every 6 hours as needed for itching or allergies.   estradiol (ESTRACE) 0.1 MG/GM vaginal cream  Self, Other  Yes No   Sig: Place vaginally three times a week.   evolocumab (REPATHA SURECLICK) 140 MG/ML prefilled autoinjector   No No   Sig: Inject 1 mL (140 mg) subcutaneously every 14 days.   ezetimibe (ZETIA) 10 MG tablet  Self, Other No No   Sig: Take 1 tablet (10 mg) by mouth daily.   folic acid (FOLVITE) 1 MG tablet  Self, Other No No   Sig: TAKE 1 TABLET BY MOUTH DAILY   gabapentin (NEURONTIN) 250 MG/5ML solution  Self, Other No No   Sig: Take 6 mLs (300 mg) by mouth at bedtime   glucose (BD GLUCOSE) 5 g chewable tablet  Self, Other No No   Sig: Take 2 tablets (10 g) by mouth as needed (low blood sugar)   glucose 40 % (400 mg/mL) gel   No No   Sig: Take 15 g by mouth every 15 minutes as needed for low blood sugar.   levothyroxine (SYNTHROID/LEVOTHROID) 175 MCG tablet  Self, Other No No   Sig: Take 1 tablet (175 mcg) by mouth daily.   linagliptin (TRADJENTA) 5 MG TABS tablet  Self, Other No No   Sig: Take 1 tablet (5 mg) by mouth daily.   omega-3 acid ethyl esters (LOVAZA) 1 g capsule   No No   Sig: Take 1 capsule (1 g) by mouth 2 times daily.   ondansetron (ZOFRAN) 4 MG tablet   No No   Sig: Take 1 tablet (4 mg) by mouth every 6 hours as needed for nausea.   oxymetazoline (AFRIN) 0.05 % nasal spray   No No   Sig: Spray 0.2 mLs (2 sprays) into both nostrils 2 times daily as needed for congestion.   predniSONE (DELTASONE) 1 MG tablet   No No   Sig: Take 4 tablets (4 mg) by mouth daily. Take 4mg (1mg tablets x4) and Take with 5mg tablet to equal 9mg daily   predniSONE (DELTASONE) 5 MG tablet   No No   Sig: Take 1 tablet (5 mg) by mouth daily. Take with 5mg tablet with 4mg (1mg tablets x4) to equal 9mg daily   sertraline (ZOLOFT) 50 MG tablet   No No   Sig: Take 1 tablet (50 mg) by mouth daily.   tacrolimus (GENERIC) 1 mg/mL suspension   No No   Sig: Take 2 mLs (2 mg) by mouth 2 times daily.   triamcinolone (KENALOG) 0.1 % external ointment   Yes No   Sig: Apply topically 2 times daily.   ursodiol (ACTIGALL) 250 MG tablet   Self, Other No No   Sig: Take 1 tablet (250 mg) by mouth 2 times daily.   vitamin A 3 MG (07667 UNITS) capsule   No No   Sig: Take 2 capsules (20,000 Units) by mouth daily for 18 days.      Facility-Administered Medications: None         Physical Exam   Vital Signs: Temp: 99.1  F (37.3  C) Temp src: Oral BP: (!) 135/108 Pulse: 84   Resp: 19 SpO2: 95 % O2 Device: None (Room air)    Weight: 181 lbs 0 oz    Constitutional: awake, alert, cooperative, no apparent distress, and appears stated age  Eyes: Lids and lashes normal, pupils equal, round and reactive to light, extra ocular muscles intact, sclera clear, conjunctiva normal  ENT: Mucus membranes dry. Edentulous. Hearing aids present.  Respiratory: No increased work of breathing, good air exchange, clear to auscultation bilaterally, no crackles or wheezing  Cardiovascular: Irregularly irregular rate and rhythm. Rate controlled. No murmurs.   GI: Healing central surgical scar with stables intact with no erythema, swelling, or drainage. Dressing over previous PEG tube site is clean, dry, and intact and prior PEG tube site well healing. Ostomy present over left upper quadrant with ostomy bag containing thick brown stool. Abdomen soft, nondistended with mild tenderness over previous surgical site.   Musculoskeletal: no lower extremity pitting edema present  Neurologic: Awake, alert, and intact. Facial muscles intact and symmetric. Moving all 4 extremities.     Medical Decision Making       Please see A&P for additional details of medical decision making.      Data     I have personally reviewed the following data over the past 24 hrs:    10.5  \   8.5 (L)   / 632 (H)     137; 137 104; 104 41.0 (H); 41.0 (H) /  180 (H); 180 (H)   5.5 (H); 5.5 (H) 19 (L); 19 (L) 1.68 (H); 1.68 (H) \     ALT: 27 AST: 23 AP: 135 TBILI: 0.2   ALB: 3.1 (L) TOT PROTEIN: 6.1 (L) LIPASE: N/A     Trop: 54 (H) BNP: N/A     TSH: 4.16 T4: N/A A1C: N/A     Procal: N/A CRP: 14.10 (H) Lactic Acid: N/A          Imaging results reviewed over the past 24 hrs:   Recent Results (from the past 24 hours)   Chest XR,  PA & LAT    Narrative    EXAM: XR CHEST 2 VIEWS  LOCATION: [facility]  DATE: 6/22/2025    INDICATION: chest pain  COMPARISON: 6/18/2025      Impression    IMPRESSION: Similar hazy opacities in the right base may reflect focal pleural calcifications. No signs of pneumonia or failure. Heart and pulmonary vascularity are normal. There are precordial projecting over the left lower chest.

## 2025-06-23 NOTE — ED PROVIDER NOTES
[facility] ([facility])    6/22/25       ED PROVIDER NOTE   History     Chief Complaint   Patient presents with    Chest Pain     HPI    Luz Thompson is a 75 year old female with a history of  primary biliary cirrhosis status post liver transplant in 2002, coronary artery disease, hypertension, atrial fibrillation, currently on apixaban, diabetes, history of CVA, CKD, anemia, squamous cell carcinoma s/p Mohs procedure of left cheek, recurrent ESBL UTIs.    Patient had a recent hospitalization (5/28/25 - 6/12/25) for sepsis and peritonitis secondary to sigmoid microperforation with intra-abdominal abscesses. Hospital course was complicated by sepsis, requiring ICU admission and ultimately open sigmoidectomy and end colostomy on 5/30/2025, sepsis was secondary to Klebsiella, Pseudomonas and VRE.  She did require stress dose steroids secondary to adrenal insufficiency in the setting of sepsis.  Of note, her home amlodipine, hydralazine, and metoprolol have all been on hold due to hypotension after critical illness/sepsis.  Patient reports that she has an implantable loop recorder and she recently received a phone call indicating that she had been in atrial fibrillation for a few weeks (see phone call dated 6/17/2025), and tonight approximately 3 hours prior to arrival, she reports that she developed some left-sided chest pain.  She was sitting at the time of onset.  Pain did not radiate.  She described it as a pressure.  She is unable to tell if she is in atrial fibrillation, denies any palpitations.  She denies any nausea or vomiting.  No fevers or chills.  She has had a cough which is mainly dry and nonproductive.  No shortness of breath, no sore throat or nasal congestion.  She has abdominal abdominal pain around her colostomy site, this has not changed.  She denies any drainage from her surgical incision.  She does report that she does have some issues with skin integrity around  "the colostomy site.  No change in the stool output in the ostomy.    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       Past Surgical History:   Procedure Laterality Date    APPENDECTOMY  1961    CATARACT IOL, RT/LT      RE12/19/2013, LE12/10/2013 - Toric lenses    CHOLECYSTECTOMY  1991    COLECTOMY WITH COLOSTOMY, COMBINED N/A 5/29/2025    Procedure: Open sigmoidectomy with end colostmy;  Surgeon: Al Campbell MD;  Location: [facility]    COLONOSCOPY  03/10/2014    Procedure: COLONOSCOPY;;  Surgeon: Gentry Ramirez MD;  [facility]    CYSTOSCOPY      ear drum repair      ENDOBRONCHIAL ULTRASOUND FLEXIBLE N/A 09/29/2017    " Procedure: ENDOBRONCHIAL ULTRASOUND FLEXIBLE;  Flexible Bronchoscopy, Endobronchial Ultrasound, Transbronchial Needle Aspiration ;  Surgeon: Eden Clinton MD;  Location: [facility] OR    ENDOSCOPIC RETROGRADE CHOLANGIOPANCREATOGRAM  2013    Procedure: ENDOSCOPIC RETROGRADE CHOLANGIOPANCREATOGRAM;  Endoscopic Retrograde Cholangiopancreatogram with single balloon enteroscopy, ballon sweep of bile duct;  Surgeon: Brett Membreno MD;  Location: [facility] OR    HC KNEE SCOPE,MED/LAT MENISECTOMY Right 08/10/2012    partial medial menisectomy only    INSERT STENT URETER Bilateral 2025    Procedure: bilateral Insert and removal stent ureter, cystoscopy;  Surgeon: David Leung MD;  Location: [facility] OR    KNEE SURGERY  1966    R knee    PICC INSERTION  2013    4fr SL PASV PICC, 40cm (1cm external) in the R basilic vein w/ tip in the low SVC    PICC INSERTION  2014    5 fr DL BioFlo Navilyst PICC, 46 cm (3 cm external) in the L basilic vein w/ tip in the SVC RA junction.    THYROIDECTOMY  2010    TRANSPLANT LIVER RECIPIENT LIVING UNRELATED  2002       Family History   Problem Relation Age of Onset    Hypertension Mother     Endometrial Cancer Mother     Hyperlipidemia Mother     Prostate Cancer Father     Macular Degeneration Father     Cancer - colorectal Maternal Grandmother         in her 80's, has surgery and removal of part of kidney,  at age 98    Heart Disease Maternal Grandfather          at 98    Glaucoma Maternal Grandfather     Cerebrovascular Disease Paternal Grandmother         in her 80's    Hypertension Paternal Grandmother     Heart Disease Paternal Grandfather         MI    Alzheimer Disease Paternal Grandfather     Allergies Son     Neurologic Disorder Daughter         Migraines    Breast Cancer Other     Anesthesia Reaction No family hx of     Crohn's Disease No family hx of     Ulcerative Colitis No family hx of     Melanoma No family hx of     Skin Cancer No family hx of   "      Social History     Tobacco Use    Smoking status: Former     Current packs/day: 0.00     Average packs/day: 1 pack/day for 18.0 years (18.0 ttl pk-yrs)     Types: Cigarettes     Start date: 1967     Quit date: 1985     Years since quittin.2    Smokeless tobacco: Never   Substance Use Topics    Alcohol use: Yes     Alcohol/week: 0.0 standard drinks of alcohol     Comment: rare - \"I toast at weddings\"         Past Medical History  Past Medical History:   Diagnosis Date    Anemia of chronic disease 10/17/2011    Anxiety     CKD (chronic kidney disease) stage 3, GFR 30-59 ml/min (H) 2012    Coccidioidomycosis, history of 2017    CVA (cerebral vascular accident) (H)     when BP was very low, small multiple infacts in frontal lobe, had \"visual field cut,\" leg weakness, and expressive aphasia - all have resolved.     Deep venous thrombosis     Diverticulosis of sigmoid colon 2013    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, history of     Received Rituxan during Summer of 2016    Glaucoma     H/O esophageal varices     Hearing loss     Hyperlipidemia 04/10/2012    Says that she does not have it anymore, not on meds    Hypertension     Hypertriglyceridemia     Liver replaced by transplant (H) 10/17/2011    Dr. Gentry Ramirez, [facility] GI      Lung infection 2023    Macular degeneration     Migraines 2012    Mumps, history of     Nonsenile cataract     Osteoarthritis of right knee 2012    Osteoporosis 2012    Paroxysmal atrial fibrillation 2017    Postablative hypothyroidism 2012    Primary biliary cirrhosis (H)     s/p Liver transplant, [id number]    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     Past Surgical History:   Procedure Laterality Date    APPENDECTOMY  196    CATARACT IOL, RT/LT      RE2013, LE12/10/2013 - " Toric lenses    CHOLECYSTECTOMY  1991    COLECTOMY WITH COLOSTOMY, COMBINED N/A 5/29/2025    Procedure: Open sigmoidectomy with end colostmy;  Surgeon: lA Campbell MD;  [facility]    COLONOSCOPY  03/10/2014    Procedure: COLONOSCOPY;;  Surgeon: Gentry Ramirez MD;  Location: [facility]    CYSTOSCOPY      ear drum repair      ENDOBRONCHIAL ULTRASOUND FLEXIBLE N/A 09/29/2017    Procedure: ENDOBRONCHIAL ULTRASOUND FLEXIBLE;  Flexible Bronchoscopy, Endobronchial Ultrasound, Transbronchial Needle Aspiration ;  Surgeon: Eden Clinton MD;  [facility]    ENDOSCOPIC RETROGRADE CHOLANGIOPANCREATOGRAM  09/19/2013    Procedure: ENDOSCOPIC RETROGRADE CHOLANGIOPANCREATOGRAM;  Endoscopic Retrograde Cholangiopancreatogram with single balloon enteroscopy, ballon sweep of bile duct;  Surgeon: Brett Membreno MD;  Location: [facility] OR     KNEE SCOPE,MED/LAT MENISECTOMY Right 08/10/2012    partial medial menisectomy only    INSERT STENT URETER Bilateral 5/29/2025    Procedure: bilateral Insert and removal stent ureter, cystoscopy;  Surgeon: David Leung MD;  [facility]: [facility]    KNEE SURGERY  1966    R knee    PICC INSERTION  09/18/2013    4fr SL PASV PICC, 40cm (1cm external) in the R basilic vein w/ tip in the low SVC    PICC INSERTION  02/21/2014    5 fr DL BioFlo Navilyst PICC, 46 cm (3 cm external) in the L basilic vein w/ tip in the SVC RA junction.    THYROIDECTOMY  03/2010    TRANSPLANT LIVER RECIPIENT LIVING UNRELATED  05/2002     acetaminophen (TYLENOL) 500 MG tablet  albuterol (PROAIR HFA/PROVENTIL HFA/VENTOLIN HFA) 108 (90 Base) MCG/ACT inhaler  apixaban ANTICOAGULANT (ELIQUIS) 2.5 MG tablet  calcitRIOL (ROCALTROL) 0.25 MCG capsule  calcium carbonate (TUMS) 500 MG chewable tablet  Continuous Glucose Sensor (FREESTYLE NINO 2 SENSOR) MISC  D-MANNOSE PO  diphenhydrAMINE (BENADRYL) 25 MG tablet  EPINEPHrine (ANY BX GENERIC EQUIV) 0.3 MG/0.3ML injection 2-pack  estradiol (ESTRACE) 0.1 MG/GM vaginal  cream  evolocumab (REPATHA SURECLICK) 140 MG/ML prefilled autoinjector  ezetimibe (ZETIA) 10 MG tablet  Ferrous Sulfate 324 MG TBEC  folic acid (FOLVITE) 1 MG tablet  gabapentin (NEURONTIN) 250 MG/5ML solution  Glucagon (GVOKE HYPOPEN) 1 MG/0.2ML pen  glucose (BD GLUCOSE) 5 g chewable tablet  glucose 40 % (400 mg/mL) gel  levothyroxine (SYNTHROID/LEVOTHROID) 175 MCG tablet  linagliptin (TRADJENTA) 5 MG TABS tablet  Multiple Vitamins-Minerals (PRESERVISION AREDS 2) CAPS  Nutrisource Fiber PO packet  omega-3 acid ethyl esters (LOVAZA) 1 g capsule  ondansetron (ZOFRAN) 4 MG tablet  oxymetazoline (AFRIN) 0.05 % nasal spray  predniSONE (DELTASONE) 1 MG tablet  predniSONE (DELTASONE) 5 MG tablet  sertraline (ZOLOFT) 50 MG tablet  tacrolimus (GENERIC) 1 mg/mL suspension  triamcinolone (KENALOG) 0.1 % external ointment  ursodiol (ACTIGALL) 250 MG tablet  vitamin A 3 MG (05321 UNITS) capsule      Allergies   Allergen Reactions    Fluconazole Hives and Itching     Full body hives  **Intradermal skin testing negative**  [See intradermal skin testing results from 8/2/2019]    Mycophenolate Diarrhea and Nausea and Vomiting     Patient stated it was chronic and lasted months      Penicillins Anaphylaxis, Hives, Itching and Rash     **Intradermal skin testing negative**  [See intradermal skin testing results from 8/2/2019]      Simvastatin Muscle Pain (Myalgia)     severe  Other reaction(s): Myalgia caused by statin    Methotrexate Other (See Comments)     Other reaction(s): Sore  Sores in mouth, esophagus, and stomach.       Morphine And Codeine Itching and Other (See Comments)     Psych disturbance  Other reaction(s): Confusion, Mood alteration    Quinolones Anxiety, Dizziness, Headache, Other (See Comments), Palpitations and Unknown     Other reaction(s): Hyperactive behavior, Lightheadedness, Mood alteration    Dizzy, light headed    Dizziness, shaky, and jumpy    Capsules, Empty Gelatin [Gelatin]     Carvedilol Diarrhea      Per pt - severe diarrhea and LE swelling  + tolerating Toprol    Lansoprazole Diarrhea    Strawberry Extract     Azithromycin Itching     [See intradermal skin testing results from 2019]    Bactrim [Sulfamethoxazole-Trimethoprim] Other (See Comments)     Numb mouth, tingling lips (treated with anti-histamines)    Cephalosporins Itching     [See intradermal skin testing results from 2019]    Ciprofloxacin Hcl Other (See Comments) and Dizziness     Insomnia, mood lability, Irregular heart beat         Doxycycline Itching and Unknown     [See intradermal skin testing results from 2019]    Lisinopril Cough    Omeprazole Itching    Tigecycline Other (See Comments)     Perioral numbness in  with long-term use    Tolectin [Nsaids] Rash    Tolmetin Rash and Itching    Tramadol Rash, Hives and Itching     Family History  Family History   Problem Relation Age of Onset    Hypertension Mother     Endometrial Cancer Mother     Hyperlipidemia Mother     Prostate Cancer Father     Macular Degeneration Father     Cancer - colorectal Maternal Grandmother         in her 80's, has surgery and removal of part of kidney,  at age 98    Heart Disease Maternal Grandfather          at 98    Glaucoma Maternal Grandfather     Cerebrovascular Disease Paternal Grandmother         in her 80's    Hypertension Paternal Grandmother     Heart Disease Paternal Grandfather         MI    Alzheimer Disease Paternal Grandfather     Allergies Son     Neurologic Disorder Daughter         Migraines    Breast Cancer Other     Anesthesia Reaction No family hx of     Crohn's Disease No family hx of     Ulcerative Colitis No family hx of     Melanoma No family hx of     Skin Cancer No family hx of      Social History   Social History     Tobacco Use    Smoking status: Former     Current packs/day: 0.00     Average packs/day: 1 pack/day for 18.0 years (18.0 ttl pk-yrs)     Types: Cigarettes     Start date: 1967     Quit date:  "1985     Years since quittin.2    Smokeless tobacco: Never   Vaping Use    Vaping status: Never Used   Substance Use Topics    Alcohol use: Yes     Alcohol/week: 0.0 standard drinks of alcohol     Comment: rare - \"I toast at weddings\"    Drug use: No      A medically appropriate review of systems was performed with pertinent positives and negatives noted in the HPI, and all other systems negative.    Physical Exam   BP: (!) 133/100  Pulse: (!) 121  Temp: 99.1  F (37.3  C)  Resp: 19  Height: 171.5 cm (5' 7.5\")  Weight: 82.1 kg (181 lb)  SpO2: 95 %  Physical Exam  Vitals and nursing note reviewed.   Constitutional:       General: She is not in acute distress.     Appearance: She is not diaphoretic.      Comments: Elderly adult female, alert, cooperative, no acute distress   HENT:      Head: Atraumatic.      Mouth/Throat:      Mouth: Mucous membranes are moist.      Pharynx: Oropharynx is clear. No oropharyngeal exudate.      Comments: Edentulous.  Large white ulcerative lesion noted on the right hard palate.  Eyes:      General: No scleral icterus.     Pupils: Pupils are equal, round, and reactive to light.   Cardiovascular:      Rate and Rhythm: Tachycardia present. Rhythm irregular.      Pulses: Normal pulses.      Heart sounds: Normal heart sounds. No murmur heard.  Pulmonary:      Effort: No respiratory distress.      Breath sounds: Normal breath sounds.   Abdominal:      Palpations: Abdomen is soft.      Tenderness: There is abdominal tenderness. There is no guarding or rebound.      Comments: Colostomy in lower abdomen, draining semiformed brown stool.  Staple line intact, no drainage or surrounding cellulitis.  Abdomen is obese, soft, appropriately tender near surgical site.  Hypoactive bowel sounds.   Musculoskeletal:         General: No tenderness.   Skin:     General: Skin is warm.      Findings: No rash.   Neurological:      Mental Status: She is alert.         ED Course, Procedures, & Data    "   Procedures       EKG Interpretation:      Interpreted by Jannet Fontaine MD  Time reviewed:2150   Symptoms at time of EKG: chest pain   Rhythm: Atrial fibrillation - rapid  Rate: 130-140  Axis: Normal  Ectopy: None  Conduction: Normal  ST Segments/ T Waves: No ST-T wave changes and No acute ischemic changes  Q Waves: None  Comparison to prior: Atrial fibrillation with RVR today, changed from sinus bradycardia from EKG dated 6/3/2025    Clinical Impression: Atrial fibrillation with RVR                   Results for orders placed or performed during the hospital encounter of 06/22/25   Chest XR,  PA & LAT    Impression    IMPRESSION: Similar hazy opacities in the right base may reflect focal pleural calcifications. No signs of pneumonia or failure. Heart and pulmonary vascularity are normal. There are precordial projecting over the left lower chest.   Basic metabolic panel   Result Value Ref Range    Sodium 137 135 - 145 mmol/L    Potassium 5.5 (H) 3.4 - 5.3 mmol/L    Chloride 104 98 - 107 mmol/L    Carbon Dioxide (CO2) 19 (L) 22 - 29 mmol/L    Anion Gap 14 7 - 15 mmol/L    Urea Nitrogen 41.0 (H) 8.0 - 23.0 mg/dL    Creatinine 1.68 (H) 0.51 - 0.95 mg/dL    GFR Estimate 31 (L) >60 mL/min/1.73m2    Calcium 8.9 8.8 - 10.4 mg/dL    Glucose 180 (H) 70 - 99 mg/dL   Result Value Ref Range    Troponin T, High Sensitivity 54 (H) <=14 ng/L   CBC with platelets and differential   Result Value Ref Range    WBC Count 10.5 4.0 - 11.0 10e3/uL    RBC Count 2.97 (L) 3.80 - 5.20 10e6/uL    Hemoglobin 8.5 (L) 11.7 - 15.7 g/dL    Hematocrit 28.5 (L) 35.0 - 47.0 %    MCV 96 78 - 100 fL    MCH 28.6 26.5 - 33.0 pg    MCHC 29.8 (L) 31.5 - 36.5 g/dL    RDW 20.2 (H) 10.0 - 15.0 %    Platelet Count 632 (H) 150 - 450 10e3/uL   RBC and Platelet Morphology   Result Value Ref Range    RBC Morphology Confirmed RBC Indices     Platelet Assessment  Automated Count Confirmed. Platelet morphology is normal.     Automated Count Confirmed. Platelet  morphology is normal.    Acanthocytes Slight (A) None Seen    Elliptocytes Slight (A) None Seen    Polychromasia Slight (A) None Seen    RBC Fragments Slight (A) None Seen   Manual Differential   Result Value Ref Range    % Neutrophils 71 %    % Lymphocytes 16 %    % Monocytes 7 %    % Eosinophils 1 %    % Basophils 0 %    % Metamyelocytes 2 %    % Myelocytes 3 %    Absolute Neutrophils 7.4 1.6 - 8.3 10e3/uL    Absolute Lymphocytes 1.7 0.8 - 5.3 10e3/uL    Absolute Monocytes 0.7 0.0 - 1.3 10e3/uL    Absolute Eosinophils 0.1 0.0 - 0.7 10e3/uL    Absolute Basophils 0.0 0.0 - 0.2 10e3/uL    Absolute Metamyelocytes 0.2 (H) <=0.0 10e3/uL    Absolute Myelocytes 0.4 (H) <=0.0 10e3/uL   Comprehensive metabolic panel   Result Value Ref Range    Sodium 137 135 - 145 mmol/L    Potassium 5.5 (H) 3.4 - 5.3 mmol/L    Carbon Dioxide (CO2) 19 (L) 22 - 29 mmol/L    Anion Gap 14 7 - 15 mmol/L    Urea Nitrogen 41.0 (H) 8.0 - 23.0 mg/dL    Creatinine 1.68 (H) 0.51 - 0.95 mg/dL    GFR Estimate 31 (L) >60 mL/min/1.73m2    Calcium 8.9 8.8 - 10.4 mg/dL    Chloride 104 98 - 107 mmol/L    Glucose 180 (H) 70 - 99 mg/dL    Alkaline Phosphatase 135 40 - 150 U/L    AST 23 0 - 45 U/L    ALT 27 0 - 50 U/L    Protein Total 6.1 (L) 6.4 - 8.3 g/dL    Albumin 3.1 (L) 3.5 - 5.2 g/dL    Bilirubin Total 0.2 <=1.2 mg/dL   Result Value Ref Range    Magnesium 2.0 1.7 - 2.3 mg/dL   TSH with free T4 reflex   Result Value Ref Range    TSH 4.16 0.30 - 4.20 uIU/mL   Result Value Ref Range    Troponin T, High Sensitivity 54 (H) <=14 ng/L   EKG 12-lead, tracing only   Result Value Ref Range    Systolic Blood Pressure  mmHg    Diastolic Blood Pressure  mmHg    Ventricular Rate 138 BPM    Atrial Rate  BPM    OK Interval  ms    QRS Duration 70 ms     ms    QTc 463 ms    P Axis  degrees    R AXIS 26 degrees    T Axis 265 degrees    Interpretation ECG       Atrial fibrillation with rapid ventricular response  Nonspecific T wave abnormality  Abnormal ECG        Medications   diltiazem (CARDIZEM) 125 mg in sodium chloride 0.9 % 125 mL infusion (has no administration in time range)   sodium chloride 0.9% BOLUS 500 mL (has no administration in time range)   diltiazem (CARDIZEM) injection 15 mg (15 mg Intravenous $Given 6/22/25 2221)        Critical care: Based upon patient's evaluation, she is critically ill and does require critical care.  Proximately 45 minutes is spent in assessment, reassessment, record review, entering and interpretation of orders, discussion with admitting team, and documentation.      Assessment & Plan    Patient presents for the above complaints.  On my evaluation patient is alert, cooperative, no acute distress.  She is tachycardic with a heart rate of 120.     EKG was done upon arrival and this demonstrates atrial fibrillation with RVR with a rate of 138.  Here in the ED she has had rates ranging from 110s to 140s.  Blood pressure is acceptable at 133/100.    Suspect her atrial fibrillation is due to the fact that she has been off of her rate control and antihypertensive medication/beta-blocker since her recent critical illness.  Recent echocardiogram dated 6/4/2025 shows normal EF of 60 to 65%.    We did give the patient a dose of diltiazem here in the emergency department followed by a diltiazem drip.  Suspect that recent critical illness and discontinuation of her medications has contributed to symptoms as contributed to presentation today.    We did establish IV access and we did draw blood for laboratory analysis.  CBC shows a normal white count of 10.5, hemoglobin is 8.5, up from 7.5 from 2 days ago, platelets are 632, this thrombocytosis appears to be new, CMP shows CKD with a creatinine of 1.68, consistent with previous, mild hyperkalemia with a potassium of 5.5, otherwise electrolytes and LFTs appear to be within normal limits, magnesium within normal limits at 2.0, TSH within normal limits at 4.16, troponin is elevated at 54, this does  appear to be somewhat above her baseline.  Repeat troponin is flat at 54.  Chest x-ray per my read does not show any evidence of infiltrates.  Radiology overread makes note of hazy opacities in the right base which may reflect focal pleural calcifications without signs of pneumonia or heart failure.    For her very mild hyperkalemia with potassium of 5.5, we have given a small fluid bolus.    Patient was given a dose of diltiazem here in the emergency department, and a diltiazem drip was also ordered.  This seems to have helped with heart rate control, heart rate is currently 105.     I believe it is reasonable to admit the patient to the hospital at this time given her atrial fib with RVR, she is going to need a period of blood pressure monitoring as they transition her back to oral medications.    I have reviewed the nursing notes. I have reviewed the findings, diagnosis, plan and need for follow up with the patient.    New Prescriptions    No medications on file       Final diagnoses:   Atrial fibrillation with RVR (H)   Chronic kidney disease, unspecified CKD stage   Hyperkalemia       Jannet Fontaine MD  [facility] EMERGENCY DEPARTMENT  6/22/2025     Jannet Fontaine MD  06/22/25 [id number]

## 2025-06-23 NOTE — ED NOTES
Bed: ED22  Expected date:   Expected time:   Means of arrival:   Comments:  SPF 3  75f  Anxiety hx afib  In afib otherwise vss  green

## 2025-06-23 NOTE — CONSULTS
Hepatology Consultation    Luz Thompson   MRN# [id number]     Age: 75 year old YOB: 1950       Referring provider: Fallon Harden  Attending Hepatologist: Dr. Trisha Bradley   Consult requested for: post liver transplant        Assessment and Recommendation:   Assessment:   Ms. 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      Recommendations:   # LDLT 5/22/02 for PBC  # Immunosuppression  # CKD  - admission for a-fib with RVR that has resolved. Continues to have debility after long hospital stay. Continues to work with PT/OT for strengthening.   - transitioned to tacrolimus on 5/28 due to delayed healing with sirolimus (mTor). Has been 2 mg BID suspension (allergy to pill protein) with prior trough levels low, likely to variable absorption with suspension.   - Trough level pending. Will adjust based on level. Goal trough level due to CKD and length of time post transplant would be ~3. She does remain on prednisone 9 mg daily.   Addendum:  Tac level 3.2. No change in dose, continue tacrolimus 2 mg BID.     Plan of care discussed with Dr. Trisha Bradley. The above note reflects our joint plan.     Thank you for the opportunity to be involved in Luz Thompson care. Please call with any questions or concerns.     LIZ Martínez, CNP  Inpatient Hepatology [facility]               History of Present Illness:   Luz Thompson is a 75 year old female with a history of LDLT 5/22/02 for PBC with a post operative  "course complicated by recent admission for diverticulitis s/p open sigmoidectomy, end colostomy (5/29/25) and klebsiella/pseudomonas (5/28), e. 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 A TTE on 6/4 did not have evidence of vegetation.     She reports today feeling improved with no longer having chest pressure. She denies any syncope, dizziness, abdominal pain. She notes still working through her training of her ostomy and does feel that it is not fully healed. She denies dysuria, change in mentation. She has been working on standing and walking more but still needs assistance.     At the time of her surgery, she had been transitioned from sirolimus to tacrolimus. Her last trough level was 6/12 at 2.2. She has also been on prednisone at 9 mg daily. She has not had episodes of rejection.           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      Past Surgical History:   Procedure Laterality Date    APPENDECTOMY  1961    CATARACT IOL, RT/LT      RE12/19/2013, LE12/10/2013 - Toric lenses    CHOLECYSTECTOMY  1991    COLECTOMY WITH COLOSTOMY, COMBINED N/A 5/29/2025    Procedure: Open sigmoidectomy with end colostmy;  Surgeon: Al Campbell MD;  Location: [facility] OR    COLONOSCOPY  03/10/2014    Procedure: COLONOSCOPY;;  Surgeon: Gentry Ramirez MD;  Location: [facility] GI    CYSTOSCOPY      ear drum repair      ENDOBRONCHIAL ULTRASOUND FLEXIBLE N/A 09/29/2017    Procedure: ENDOBRONCHIAL ULTRASOUND FLEXIBLE;  Flexible Bronchoscopy, Endobronchial Ultrasound, Transbronchial Needle Aspiration ;  Surgeon: Eden Clinton MD;  Location: [facility] OR    ENDOSCOPIC RETROGRADE CHOLANGIOPANCREATOGRAM  09/19/2013    Procedure: ENDOSCOPIC RETROGRADE CHOLANGIOPANCREATOGRAM;  Endoscopic Retrograde Cholangiopancreatogram with single balloon enteroscopy, ballon sweep of bile duct;  Surgeon: Brett Membreno MD;  Location: [facility] OR     KNEE SCOPE,MED/LAT MENISECTOMY Right 08/10/2012    partial medial menisectomy only    INSERT STENT URETER Bilateral 5/29/2025    Procedure: bilateral Insert and removal stent ureter, cystoscopy;  Surgeon: David Leung MD;  Location: [facility] OR    KNEE SURGERY  1966    R knee    PICC INSERTION  09/18/2013    4fr SL PASV PICC, 40cm (1cm external) in the R basilic vein w/ tip in the low SVC    PICC INSERTION  02/21/2014    5 fr DL DevinFlo Vijaya  "PICC, 46 cm (3 cm external) in the L basilic vein w/ tip in the SVC RA junction.    THYROIDECTOMY  2010    TRANSPLANT LIVER RECIPIENT LIVING UNRELATED  2002             Family hx Social hx   Family History   Problem Relation Age of Onset    Hypertension Mother     Endometrial Cancer Mother     Hyperlipidemia Mother     Prostate Cancer Father     Macular Degeneration Father     Cancer - colorectal Maternal Grandmother         in her 80's, has surgery and removal of part of kidney,  at age 98    Heart Disease Maternal Grandfather          at 98    Glaucoma Maternal Grandfather     Cerebrovascular Disease Paternal Grandmother         in her 80's    Hypertension Paternal Grandmother     Heart Disease Paternal Grandfather         MI    Alzheimer Disease Paternal Grandfather     Allergies Son     Neurologic Disorder Daughter         Migraines    Breast Cancer Other     Anesthesia Reaction No family hx of     Crohn's Disease No family hx of     Ulcerative Colitis No family hx of     Melanoma No family hx of     Skin Cancer No family hx of       Social History     Tobacco Use    Smoking status: Former     Current packs/day: 0.00     Average packs/day: 1 pack/day for 18.0 years (18.0 ttl pk-yrs)     Types: Cigarettes     Start date: 1967     Quit date: 1985     Years since quittin.2    Smokeless tobacco: Never   Vaping Use    Vaping status: Never Used   Substance Use Topics    Alcohol use: Yes     Alcohol/week: 0.0 standard drinks of alcohol     Comment: rare - \"I toast at weddings\"    Drug use: No                Immunizations:     Immunization History   Administered Date(s) Administered    COVID-19 12+ (Pfizer) 10/17/2024    COVID-19 Bivalent 12+ (Pfizer) 2021, 03/15/2021    COVID-19 MONOVALENT 12+ (Pfizer) 2021, 03/15/2021, 2021, 10/09/2021    COVID-19 Vaccine, Unspecified 2021, 03/15/2021, 2021    Flu 65+ (Fluad) 2019    Flu, Unspecified 2022    " [id number] Novel Flu P-free 11/10/2009    HEPA 07/01/2001    HepB, Unspecified 07/01/2001    Influenza (High Dose) Trivalent,PF (Fluzone) 09/25/2015, 10/30/2016, 10/23/2017, 10/24/2024    Influenza (IIV3) PF 11/10/2004, 09/29/2007, 10/01/2010, 09/27/2012, 10/29/2012, 09/30/2013, 09/01/2016    Influenza Vaccine 18-64 (Flublok) 10/20/2018    Influenza Vaccine 65+ (Fluzone HD) 11/03/2020, 03/14/2022, 10/20/2022    Influenza Vaccine, 6+MO IM (QUADRIVALENT W/PRESERVATIVES) 10/01/2020    Pneumo Conj 13-V (2010&after) 02/26/2014, 01/01/2016    Pneumococcal 23 valent 12/01/2007, 01/01/2014, 05/25/2016    TD,PF 7+ (Tenivac) 01/01/2007    TDAP (Adacel,Boostrix) 01/01/2014    TDAP Vaccine (Adacel) 09/17/2007, 02/26/2014            Allergies:     Allergies   Allergen Reactions    Fluconazole Hives and Itching     Full body hives  **Intradermal skin testing negative**  [See intradermal skin testing results from 8/2/2019]    Mycophenolate Diarrhea and Nausea and Vomiting     Patient stated it was chronic and lasted months      Penicillins Anaphylaxis, Hives, Itching and Rash     **Intradermal skin testing negative**  [See intradermal skin testing results from 8/2/2019]      Simvastatin Muscle Pain (Myalgia)     severe  Other reaction(s): Myalgia caused by statin    Methotrexate Other (See Comments)     Other reaction(s): Sore  Sores in mouth, esophagus, and stomach.       Morphine And Codeine Itching and Other (See Comments)     Psych disturbance  Other reaction(s): Confusion, Mood alteration    Quinolones Anxiety, Dizziness, Headache, Other (See Comments), Palpitations and Unknown     Other reaction(s): Hyperactive behavior, Lightheadedness, Mood alteration    Dizzy, light headed    Dizziness, shaky, and jumpy    Capsules, Empty Gelatin [Gelatin]     Carvedilol Diarrhea     Per pt - severe diarrhea and LE swelling  + tolerating Toprol    Lansoprazole Diarrhea    Strawberry Extract     Azithromycin Itching     [See intradermal skin  testing results from 8/2/2019]    Bactrim [Sulfamethoxazole-Trimethoprim] Other (See Comments)     Numb mouth, tingling lips (treated with anti-histamines)    Cephalosporins Itching     [See intradermal skin testing results from 8/2/2019]    Ciprofloxacin Hcl Other (See Comments) and Dizziness     Insomnia, mood lability, Irregular heart beat         Doxycycline Itching and Unknown     [See intradermal skin testing results from 8/2/2019]    Lisinopril Cough    Omeprazole Itching    Tigecycline Other (See Comments)     Perioral numbness in 2025 with long-term use    Tolectin [Nsaids] Rash    Tolmetin Rash and Itching    Tramadol Rash, Hives and Itching             Medications:     Current Facility-Administered Medications   Medication Dose Route Frequency Provider Last Rate Last Admin    acetaminophen (TYLENOL) tablet 1,000 mg  1,000 mg Oral BID Aiden Meng MD        albuterol (PROVENTIL HFA/VENTOLIN HFA) inhaler  2 puff Inhalation Q4H PRN Aiden Meng MD        apixaban ANTICOAGULANT (ELIQUIS) tablet 2.5 mg  2.5 mg Oral BID Aiden Meng MD   2.5 mg at 06/23/25 0808    calcitRIOL (ROCALTROL) capsule 0.25 mcg  0.25 mcg Oral Daily Aiden Meng MD        calcium carbonate (TUMS) chewable tablet 1,000 mg  1,000 mg Oral Q4H PRN Aiden Meng MD        glucose gel 15-30 g  15-30 g Oral Q15 Min PRN Aiden Meng MD        Or    dextrose 50 % injection 25-50 mL  25-50 mL Intravenous Q15 Min PRN Aiden Meng MD        Or    glucagon injection 1 mg  1 mg Subcutaneous Q15 Min PRN Aiden Meng MD        ezetimibe (ZETIA) tablet 10 mg  10 mg Oral Daily Aiden Meng MD        ferrous sulfate (FEROSUL) tablet 325 mg  325 mg Oral Daily Aiden Meng MD   325 mg at 06/23/25 0808    folic acid (FOLVITE) tablet 1,000 mcg  1,000 mcg Oral Daily Aiden Meng MD   1,000 mcg at 06/23/25 0808    gabapentin (NEURONTIN) solution 300 mg  300 mg Oral At Bedtime Aiden Meng MD   300 mg at 06/23/25  0558    insulin aspart (NovoLOG) injection (RAPID ACTING)  1-7 Units Subcutaneous TID AC Aiden Meng MD        insulin aspart (NovoLOG) injection (RAPID ACTING)  1-5 Units Subcutaneous At Bedtime Aiden Meng MD        levothyroxine (SYNTHROID/LEVOTHROID) tablet 175 mcg  175 mcg Oral Daily Aiden Meng MD        lidocaine (LMX4) cream   Topical Q1H PRN Aiden Meng MD        lidocaine 1 % 0.1-1 mL  0.1-1 mL Other Q1H PRN Aiden Meng MD        [Held by provider] linagliptin (TRADJENTA) tablet 5 mg  5 mg Oral Daily Aiden Meng MD        multivitamin  with lutein (OCUVITE WITH LUTEIN) per capsule 1 capsule  1 capsule Oral BID Aiden Meng MD        Nutrisource Fiber PO packet 1 each  1 packet Oral Daily Aiden Meng MD        polyethylene glycol (MIRALAX) Packet 17 g  17 g Oral BID PRN Aiden Meng MD        predniSONE (DELTASONE) half-tab 9 mg  9 mg Oral Daily Russ Roberts MD        senna-docusate (SENOKOT-S/PERICOLACE) 8.6-50 MG per tablet 1 tablet  1 tablet Oral BID PRN Aiden Meng MD        Or    senna-docusate (SENOKOT-S/PERICOLACE) 8.6-50 MG per tablet 2 tablet  2 tablet Oral BID PRN Aiden Meng MD        sertraline (ZOLOFT) tablet 50 mg  50 mg Oral Daily Aiden Meng MD   50 mg at 06/23/25 0807    sodium chloride (PF) 0.9% PF flush 3 mL  3 mL Intracatheter Q8H LUZMARIA Aiden Meng MD   3 mL at 06/23/25 0549    sodium chloride (PF) 0.9% PF flush 3 mL  3 mL Intracatheter q1 min prn Aiden Meng MD        tacrolimus (GENERIC) suspension 2 mg  2 mg Oral BID Aiden Meng MD        triamcinolone (KENALOG) 0.1 % ointment   Topical BID Aiden Meng MD        ursodiol (ACTIGALL) tablet 250 mg  250 mg Oral BID Aiden Meng MD        vitamin A 3 MG (98955 UNITS) capsule 20,000 Units  20,000 Units Oral Daily Aiden Meng MD         Current Outpatient Medications   Medication Sig Dispense Refill    acetaminophen (TYLENOL) 500 MG tablet  Take 1,000 mg by mouth 2 times daily. During 4/16/25 [facility] rec pt states take BID everyday      albuterol (PROAIR HFA/PROVENTIL HFA/VENTOLIN HFA) 108 (90 Base) MCG/ACT inhaler Inhale 2 puffs into the lungs every 4 hours as needed for shortness of breath, wheezing or cough. 18 g 0    apixaban ANTICOAGULANT (ELIQUIS) 2.5 MG tablet Take 1 tablet (2.5 mg) by mouth 2 times daily. 60 tablet 0    calcitRIOL (ROCALTROL) 0.25 MCG capsule Take 1 capsule (0.25 mcg) by mouth daily. 90 capsule 1    calcium carbonate (TUMS) 500 MG chewable tablet Take 2 tablets (1,000 mg) by mouth every 4 hours as needed for heartburn.      Continuous Glucose Sensor (FREESTYLE NINO 2 SENSOR) MISC Change every 14 days. 2 each 5    D-MANNOSE PO Take 1 Scoop by mouth 2 times daily.      diphenhydrAMINE (BENADRYL) 25 MG tablet Take 25 mg by mouth every 6 hours as needed for itching or allergies.      EPINEPHrine (ANY BX GENERIC EQUIV) 0.3 MG/0.3ML injection 2-pack Inject 0.3 mLs (0.3 mg) into the muscle as needed for anaphylaxis. May repeat one time in 5-15 minutes if response to initial dose is inadequate. 2 each 1    estradiol (ESTRACE) 0.1 MG/GM vaginal cream Place vaginally three times a week.      evolocumab (REPATHA SURECLICK) 140 MG/ML prefilled autoinjector Inject 1 mL (140 mg) subcutaneously every 14 days. 6 mL 3    ezetimibe (ZETIA) 10 MG tablet Take 1 tablet (10 mg) by mouth daily. 90 tablet 3    Ferrous Sulfate 324 MG TBEC Take 1 tablet by mouth daily.      folic acid (FOLVITE) 1 MG tablet TAKE 1 TABLET BY MOUTH DAILY 90 tablet 3    gabapentin (NEURONTIN) 250 MG/5ML solution Take 6 mLs (300 mg) by mouth at bedtime 180 mL 0    Glucagon (GVOKE HYPOPEN) 1 MG/0.2ML pen Inject the contents of 1 device under the skin into lower abdomen, outer thigh, or outer upper arm as needed for hypoglycemia. If no response after 15 minutes, additional 1 mg dose from a new device may be injected while waiting for emergency assistance.      glucose (BD  "GLUCOSE) 5 g chewable tablet Take 2 tablets (10 g) by mouth as needed (low blood sugar) 40 tablet 1    glucose 40 % (400 mg/mL) gel Take 15 g by mouth every 15 minutes as needed for low blood sugar.      levothyroxine (SYNTHROID/LEVOTHROID) 175 MCG tablet Take 1 tablet (175 mcg) by mouth daily. 90 tablet 1    linagliptin (TRADJENTA) 5 MG TABS tablet Take 1 tablet (5 mg) by mouth daily. 90 tablet 1    Multiple Vitamins-Minerals (PRESERVISION AREDS 2) CAPS Take 1 capsule by mouth 2 times daily      Nutrisource Fiber PO packet Take 1 packet by mouth daily. Benefiber      omega-3 acid ethyl esters (LOVAZA) 1 g capsule Take 1 capsule (1 g) by mouth 2 times daily.      ondansetron (ZOFRAN) 4 MG tablet Take 1 tablet (4 mg) by mouth every 6 hours as needed for nausea.      oxymetazoline (AFRIN) 0.05 % nasal spray Spray 0.2 mLs (2 sprays) into both nostrils 2 times daily as needed for congestion. 30 mL 0    predniSONE (DELTASONE) 1 MG tablet Take 4 tablets (4 mg) by mouth daily. Take 4mg (1mg tablets x4) and Take with 5mg tablet to equal 9mg daily 90 tablet 0    predniSONE (DELTASONE) 5 MG tablet Take 1 tablet (5 mg) by mouth daily. Take with 5mg tablet with 4mg (1mg tablets x4) to equal 9mg daily 30 tablet 0    sertraline (ZOLOFT) 50 MG tablet Take 1 tablet (50 mg) by mouth daily. 30 tablet 0    tacrolimus (GENERIC) 1 mg/mL suspension Take 2 mLs (2 mg) by mouth 2 times daily. 120 mL 11    triamcinolone (KENALOG) 0.1 % external ointment Apply topically 2 times daily.      ursodiol (ACTIGALL) 250 MG tablet Take 1 tablet (250 mg) by mouth 2 times daily. 180 tablet 3    vitamin A 3 MG (34765 UNITS) capsule Take 2 capsules (20,000 Units) by mouth daily for 18 days.                 Review of Systems:    ROS: 10 point ROS neg other than the symptoms noted above in the HPI.          Physical Exam:   Blood pressure (!) 158/89, pulse 90, temperature 97.7  F (36.5  C), temperature source Oral, resp. rate 18, height 1.715 m (5' 7.5\"), " "weight 82.1 kg (181 lb), last menstrual period 06/01/1988, SpO2 97%, not currently breastfeeding. Body mass index is 27.93 kg/m .    General: In no acute distress, no facial muscle wasting  Neuro: AOx3, No asterixis  HEENT: PERRL Noscleral icterus,   CV:  Skin warm and dry  Lungs:  Respirations even and nonlabored on room air  Abd: Nontender, nondistended  Extrem: Noperipehral edema  Skin: Nojaundice  Psych: pleasant     Wt Readings from Last 10 Encounters:   06/22/25 82.1 kg (181 lb)   06/20/25 82.1 kg (181 lb)   06/18/25 84.2 kg (185 lb 9.6 oz)   06/16/25 82.5 kg (181 lb 12.8 oz)   06/12/25 85.6 kg (188 lb 11.2 oz)   06/11/25 87 kg (191 lb 12.8 oz)   05/27/25 82.7 kg (182 lb 6.4 oz)   05/27/25 81.6 kg (180 lb)   05/22/25 82.7 kg (182 lb 6.4 oz)   05/19/25 83.3 kg (183 lb 9.6 oz)             Data:     Lab Results   Component Value Date    WBC 10.2 06/23/2025    WBC 7.1 09/18/2020     Lab Results   Component Value Date    RBC 2.80 06/23/2025    RBC 3.80 09/18/2020     Lab Results   Component Value Date    HGB 8.1 06/23/2025    HGB 11.2 09/18/2020     Lab Results   Component Value Date    HCT 27.2 06/23/2025    HCT 35.0 09/18/2020     No components found for: \"MCT\"  Lab Results   Component Value Date    MCV 97 06/23/2025    MCV 92 09/18/2020     Lab Results   Component Value Date    MCH 28.9 06/23/2025    MCH 29.5 09/18/2020     Lab Results   Component Value Date    MCHC 29.8 06/23/2025    MCHC 32.0 09/18/2020     Lab Results   Component Value Date    RDW 20.1 06/23/2025    RDW 17.2 09/18/2020     Lab Results   Component Value Date     06/23/2025     09/18/2020       Last Basic Metabolic Panel:  Lab Results   Component Value Date     06/23/2025     09/18/2020      Lab Results   Component Value Date    POTASSIUM 4.6 06/23/2025    POTASSIUM 4.5 05/29/2025    POTASSIUM 4.0 06/24/2022    POTASSIUM 4.4 09/18/2020     Lab Results   Component Value Date    CHLORIDE 107 06/23/2025    CHLORIDE 98 " "12/05/2022    CHLORIDE 105 09/18/2020     Lab Results   Component Value Date    KEILY 8.7 06/23/2025    KEILY 8.8 09/18/2020     Lab Results   Component Value Date    CO2 22 06/23/2025    CO2 28 06/24/2022    CO2 29 09/18/2020     Lab Results   Component Value Date    BUN 38.0 06/23/2025    BUN 44 06/24/2022    BUN 19 09/18/2020     Lab Results   Component Value Date    CR 1.56 06/23/2025    CR 1.3 09/18/2020     Lab Results   Component Value Date    GLC 73 06/23/2025    GLC 89 06/23/2025     06/24/2022     09/18/2020       Liver Function Studies -   Recent Labs   Lab Test 06/23/25  0543   PROTTOTAL 6.1*   ALBUMIN 3.2*   BILITOTAL 0.2   ALKPHOS 124   AST 17   ALT 26       Lab Results   Component Value Date    INR 1.40 06/23/2025             Previous work-up:   Lab Results   Component Value Date    HEPBANG Nonreactive 05/13/2016    HBCAB Nonreactive 05/13/2016    AUSAB 0.09 05/13/2016    HCVAB  05/13/2016     Nonreactive   Assay performance characteristics have not been established for newborns,   infants, and children      HCVRNA <25 12/23/2008    LAURA 44 04/07/2025    IRONSAT 13 (L) 04/07/2025     08/05/2019    TSH 4.16 06/22/2025    CHOL 291 (H) 09/04/2024    HDL 72 09/04/2024     (H) 09/04/2024    TRIG 329 (H) 09/04/2024    A1C 6.3 (H) 05/29/2025      No results found for: \"SPECDES\", \"LDRESULTS\"          Endoscopy:     No results found. However, due to the size of the patient record, not all encounters were searched. Please check Results Review for a complete set of results.  No results found. However, due to the size of the patient record, not all encounters were searched. Please check Results Review for a complete set of results.  No results found. However, due to the size of the patient record, not all encounters were searched. Please check Results Review for a complete set of results.      IMAGING:  Results for orders placed during the hospital encounter of 08/26/19    US Liver " Transplant    Narrative  EXAMINATION: US LIVER TRANSPLANT, 8/27/2019 8:45 AM    COMPARISON: CT abdomen/pelvis 8/27/2019. Transplant ultrasound  5/13/2016    HISTORY: History of transplant liver in 2011. Now with urinary tract  infection and elevated LFTs.    TECHNIQUE:  Gray-scale, color Doppler and spectral flow analysis.    FINDINGS:  There is no ascites.    Liver:   The liver demonstrates normal homogeneous echotexture. No  evidence of a focal hepatic mass.    Bile Ducts: No intrahepatic biliary dilatation. The common bile duct  is not well-visualized.    Gallbladder: The gallbladder is surgically absent.    Kidneys:  Right kidney:  The right kidney demonstrates increased parenchymal  echogenicity with cortical thinning. No focal mass or hydronephrosis.  10.7 cm in long axis dimension.  Left kidney:  The left kidney demonstrates increased parenchymal  echogenicity with cortical thinning. No focal mass or hydronephrosis.  9.6 cm in long axis dimension.    Pancreas: This was portions of the head and body the pancreas are  normal.    Spleen:  The spleen is normal in size, measuring 9.3 cm.    Visualized portions of the aorta are unremarkable.    LIVER DOPPLER:  Splenic vein:  Patent continuous normal antegrade direction flow  towards the liver, 21 cm/sec.  Extrahepatic portal vein:  Patent continuous antegrade flow, 13  cm/sec.  Portal vein at anastomosis: Patent continuous antegrade flow, 11  cm/sec.  Intrahepatic portal vein:  Patent continuous antegrade flow, 15  cm/sec.  Right portal vein flow is antegrade, measuring 31 cm/sec.  Left portal vein flow is antegrade, measuring 11 cm/sec.    Inferior vena cava: patent with flow toward the heart throughout..  IVC above anastomosis:  52 cm/sec.  IVC at anastomosis:  57 cm/sec.  Intrahepatic IVC:  23 cm/sec.  Extrahepatic IVC:  20 cm/sec.    Right, mid, left hepatic veins: Patent with flow towards the inferior  vena cava.    Extrahepatic hepatic artery: Low resistance  waveform with flow towards  the liver. 104 cm/sec with resistive index 0.75.  Right hepatic artery: 111 cm/sec with resistive index 0.70.  Left hepatic artery: 43 cm/sec with resistive index 0.70.    Impression  Impression:  1. Normal grayscale and Doppler evaluation of the transplant liver  although the common bile was not visualized.  2. Echogenic kidneys compatible with history of medical renal disease.    I have personally reviewed the examination and initial interpretation  and I agree with the findings.    ZACHARIAH AHN, DO    Results for orders placed in visit on 06/18/25    XR Chest Port 1 View    Narrative  EXAM: XR CHEST PORT 1 VIEW  LOCATION: [facility]  DATE: 6/18/2025    INDICATION:  SOB (shortness of breath), Hypoxia  COMPARISON: Chest x-ray and CT AP 5/28/2025 and older studies    Impression  IMPRESSION: Leadless cardiac monitoring device over the left chest. Given differences in inspiration no interval change. Hazy opacities in the right base reflect focal pleural calcifications. No signs of pneumonia or failure. Heart and pulmonary  vascularity are normal.    No results found for this or any previous visit.    No results found for this or any previous visit.    Chest XR,  PA & LAT  Result Date: 6/22/2025  EXAM: XR CHEST 2 VIEWS LOCATION: [facility] DATE: 6/22/2025 INDICATION: chest pain COMPARISON: 6/18/2025     IMPRESSION: Similar hazy opacities in the right base may reflect focal pleural calcifications. No signs of pneumonia or failure. Heart and pulmonary vascularity are normal. There are precordial projecting over the left lower chest.    Cardiac Device Check - Remote  Result Date: 6/19/2025  [employer] [id number] Loop Recorder Data Since: 3/16/25 Symptom: 0 Tachy:  8 Tachy episodes, ECGs show AF with RVR. Pause: 0 Surya: 52 Surya episodes, see alert below for results. AT: 0 AF: 60 AF episodes logged, episodes reviewed by RNs.  "Taking Eliquis. Time in AT/AF: 22.6% **All episodes reviewed by RNs. See alert results below. Presenting Rhythm: AF w/variable rates Heart rate: large variability, ranges mostly from , reaches up to 220bpm. Battery: Good Careplan: F/u remote device check in 3 months. Episodes were routed to Dr. Giorgio STEINER by RN. Scheduled OV with Dr. Morgan on 8/27/25. Results were given to pt by NJ Brown, Device Specialist. I have reviewed and interpreted the device interrogation, settings, programming and nurse's summary. The device is functioning within normal device parameters, unless otherwise noted above. I agree with the current findings, assessment, and plan. No Epic Interface Required Alert received from patient's ILR for 3 tachy, 52 zhou, and 30 AF episodes. AF: Upon review, patient has been in persistent AF since 6/2/25. ECGs available for AF episodes all confirm AF with V rates in the 's per histogram. Presenting ECG logged 6/17/25 @ 0140 confirms patient remained in AF at that time. Tachy: 3 episodes logged lasting 1m01s to 3m13s. ECGs show likely RVR in the 170-190's with noise Zhou: 52 episodes logged lasting 29s to 1h05m, most recent episode occurred on 6/3/25. 5 ECGs for review show SB in the 30's. Patient has a known history of symptomatic pAF, takes eliquis and pill-in-the-pocket metoprolol for breakthrough episodes. I spoke with patient. She has not had any symptoms associated with the AF but states \"I've had better days.\" Patient states that she had a \"growth in her stomach\" with plans for outpatient surgery in 2 weeks but it became emergent and she was rushed to the hospital about 2 weeks ago. She had emergent surgery and ended up with a stoma. The hospital did inform her that she was in AF during her stay but she thought that she was going in-and-out so was surprised to hear that she has been in it persistently. She states that she was frequently alerting for HRs in the 40's during her stay which " "made the hospital \"quite anxious.\" She is wondering if the loop recorder picked up on any of these episodes... I informed her that the loop recorder is set to alert us to any HRs <40bpm so would not alert us to HRs in the 40's. She is aware that her device did alert to some HRs <40 at the beginning of this month. She states that she is back on her eliquis but believes that it was held for a time while she was hospitalized. She is aware that an update will be routed to one of Dr. Zuluaga's providers for review and recommendation. [place] has not followed with any providers other than Dr. Zuluaga.. Will route an update to Dr. Alvares. She is scheduled to establish with Dr. Morgan in 8/2025. TY RN    XR Chest Port 1 View  Result Date: 6/19/2025  EXAM: XR CHEST PORT 1 VIEW LOCATION: [facility] DATE: 6/18/2025 INDICATION:  SOB (shortness of breath), Hypoxia COMPARISON: Chest x-ray and CT AP 5/28/2025 and older studies     IMPRESSION: Leadless cardiac monitoring device over the left chest. Given differences in inspiration no interval change. Hazy opacities in the right base reflect focal pleural calcifications. No signs of pneumonia or failure. Heart and pulmonary vascularity are normal.                "

## 2025-06-24 ENCOUNTER — TELEPHONE (OUTPATIENT)
Dept: CARDIOLOGY | Facility: CLINIC | Age: 75
End: 2025-06-24

## 2025-06-24 LAB
GLUCOSE BLDC GLUCOMTR-MCNC: 119 MG/DL (ref 70–99)
GLUCOSE BLDC GLUCOMTR-MCNC: 153 MG/DL (ref 70–99)
GLUCOSE BLDC GLUCOMTR-MCNC: 219 MG/DL (ref 70–99)
GLUCOSE BLDC GLUCOMTR-MCNC: 74 MG/DL (ref 70–99)
MAGNESIUM SERPL-MCNC: 2.3 MG/DL (ref 1.7–2.3)
POTASSIUM SERPL-SCNC: 4.9 MMOL/L (ref 3.4–5.3)

## 2025-06-24 PROCEDURE — 120N000002 HC R&B MED SURG/OB UMMC

## 2025-06-24 PROCEDURE — G0463 HOSPITAL OUTPT CLINIC VISIT: HCPCS

## 2025-06-24 PROCEDURE — 250N000013 HC RX MED GY IP 250 OP 250 PS 637

## 2025-06-24 PROCEDURE — 99233 SBSQ HOSP IP/OBS HIGH 50: CPT | Mod: GC | Performed by: STUDENT IN AN ORGANIZED HEALTH CARE EDUCATION/TRAINING PROGRAM

## 2025-06-24 PROCEDURE — 84132 ASSAY OF SERUM POTASSIUM: CPT

## 2025-06-24 PROCEDURE — 83735 ASSAY OF MAGNESIUM: CPT | Performed by: STUDENT IN AN ORGANIZED HEALTH CARE EDUCATION/TRAINING PROGRAM

## 2025-06-24 PROCEDURE — 36415 COLL VENOUS BLD VENIPUNCTURE: CPT

## 2025-06-24 PROCEDURE — 250N000012 HC RX MED GY IP 250 OP 636 PS 637

## 2025-06-24 PROCEDURE — 250N000012 HC RX MED GY IP 250 OP 636 PS 637: Performed by: HOSPITALIST

## 2025-06-24 PROCEDURE — 82962 GLUCOSE BLOOD TEST: CPT

## 2025-06-24 RX ORDER — METOPROLOL SUCCINATE 50 MG/1
50 TABLET, EXTENDED RELEASE ORAL DAILY
Status: SHIPPED
Start: 2025-06-24 | End: 2025-06-25

## 2025-06-24 RX ORDER — METOPROLOL TARTRATE 25 MG/1
25 TABLET, FILM COATED ORAL
Status: COMPLETED | OUTPATIENT
Start: 2025-06-24 | End: 2025-06-24

## 2025-06-24 RX ADMIN — TRIAMCINOLONE ACETONIDE: 1 OINTMENT TOPICAL at 09:21

## 2025-06-24 RX ADMIN — FOLIC ACID 1000 MCG: 1 TABLET ORAL at 09:19

## 2025-06-24 RX ADMIN — CALCITRIOL CAPSULES 0.25 MCG 0.25 MCG: 0.25 CAPSULE ORAL at 09:19

## 2025-06-24 RX ADMIN — EZETIMIBE 10 MG: 10 TABLET ORAL at 09:19

## 2025-06-24 RX ADMIN — METOPROLOL SUCCINATE 50 MG: 25 TABLET, EXTENDED RELEASE ORAL at 09:15

## 2025-06-24 RX ADMIN — APIXABAN 2.5 MG: 2.5 TABLET, FILM COATED ORAL at 09:20

## 2025-06-24 RX ADMIN — Medication 1 CAPSULE: at 09:19

## 2025-06-24 RX ADMIN — Medication 20000 UNITS: at 09:19

## 2025-06-24 RX ADMIN — URSODIOL 250 MG: 250 TABLET, FILM COATED ORAL at 20:31

## 2025-06-24 RX ADMIN — SERTRALINE HYDROCHLORIDE 50 MG: 50 TABLET ORAL at 09:19

## 2025-06-24 RX ADMIN — URSODIOL 250 MG: 250 TABLET, FILM COATED ORAL at 09:19

## 2025-06-24 RX ADMIN — TACROLIMUS 2 MG: 5 CAPSULE ORAL at 20:31

## 2025-06-24 RX ADMIN — FERROUS SULFATE TAB 325 MG (65 MG ELEMENTAL FE) 325 MG: 325 (65 FE) TAB at 09:19

## 2025-06-24 RX ADMIN — APIXABAN 2.5 MG: 2.5 TABLET, FILM COATED ORAL at 20:31

## 2025-06-24 RX ADMIN — Medication 1 CAPSULE: at 20:31

## 2025-06-24 RX ADMIN — INSULIN ASPART 2 UNITS: 100 INJECTION, SOLUTION INTRAVENOUS; SUBCUTANEOUS at 18:55

## 2025-06-24 RX ADMIN — Medication 9 MG: at 09:21

## 2025-06-24 RX ADMIN — TACROLIMUS 2 MG: 5 CAPSULE ORAL at 09:19

## 2025-06-24 RX ADMIN — LEVOTHYROXINE SODIUM 175 MCG: 0.17 TABLET ORAL at 09:19

## 2025-06-24 RX ADMIN — METOPROLOL TARTRATE 25 MG: 25 TABLET, FILM COATED ORAL at 18:53

## 2025-06-24 ASSESSMENT — ACTIVITIES OF DAILY LIVING (ADL)
ADLS_ACUITY_SCORE: 75

## 2025-06-24 NOTE — PLAN OF CARE
Nursing note      Plan of Care Reviewed With: patient    Overall Patient Progress: improvingOverall Patient Progress: improving    Outcome Evaluation: Pt remains AVSS. A fib, tachy. Adequate UOP via primafit. Colostomy, adequate output. Midline abd incision remains O2A. Plan: discharge to [facility] appt @ 1200.

## 2025-06-24 NOTE — DISCHARGE INSTRUCTIONS
YOU NEED TO RESCHEDULE APPOINTMENT WITH OSTOMY NURSE IN CLINIC            Ostomy Discharge Instructions/Orders    Pouch Change Instructions:  1. Change appliance 2-3 times weekly and as needed for any leakage.  Notify the WOC Nurse if changing pouch more often than daily or if skin is itchy.   2. Empty pouch when no more than 1/3 to 1/2 full (solid, liquid, gas).  3. May shower with pouch on or off, removing pouch/ barrier down to the skin is ok. (Note: fecal output can not be controlled, so there may be occasional clean up if you choose to shower without a pouch.)    The pouching procedure:  1.  Gather and organize supplies  2.  Remove old pouch, observe back for any leakage  3.  Cut new pouch barrier to proper size - only about 1/8 inch larger than stoma  4.  Clean skin with only water and soft paper towel. Do not use barrier wipes, soap or lotion as these will leave residue that affects pouch adherence  5.  Apply stoma powder to any weepy open areas of skin around stoma.  Brush off all excess powder that does not stick.   6.  Apply Cavilon skin prep over powder and let dry   7.  Apply barrier ring to back (adhesive side) of the new pouch barrer  8.  Pull abdomen flat when applying new pouch (makes the stoma a [place] shape instead of oval)  9.  Apply prepared barrier to the clean skin and press into place.  10. Hold hand on pouch/over stoma to warm pouch (2 - 5 minutes) to help adherence.      Stoma Injury or Concern:  Call the WOC Nurse to report the following concerns (see phone number above):  Skin concerns around the stoma: red, rash, open areas  Concerns with the pouch opening being too small or too large  Bulging around the stoma without pain and with continued output  Pouch leaking daily or more frequently      Recommended supplies to order:  Pouches:  2 piece fecal pouch with filter 57mm [place] # [id number]  Skin barriers:2 piece Skin Barrier/Wafer: [place] 2 piece soft convex wafer with tape border 57 mm  "#[id number]  Accessories:Ring/Paste: [employer] Cera 2\" Ring #[id number] Accessories: [employer] powder #[id number] and 3M Cavilon No Sting Skin (with stick) barrier #8847      "

## 2025-06-24 NOTE — PLAN OF CARE
[id number]    Neuro: A&Ox4. Call appropriately, cooperative.   Cardiac: A-fib with HR 90's-110's. VSS.   Respiratory: Sating >92% on RA.  GI/: Adequate urine output via primofit. Colostomy pouch.   Diet/appetite: Tolerating regular diet. Eating well. BG ACHS.   Activity:  Assist of 2 with lift & repositioning, up to chair and in halls.  Pain: At acceptable level on current regimen, managed with scheduled tylenol.  Skin: No new deficits noted.  LDA's: R PIV    Plan: Continue with POC. Notify primary team with changes.

## 2025-06-24 NOTE — PROGRESS NOTES
[facility]    Progress Note - Medicine Service, JOVANNY TEAM 3       Date of Admission:  6/22/2025    Assessment & Plan   Luz Thompson is a 75 year old female with a past medical history of atrial fibrillation, DVT on eliquis, CVA, CKD III, HFpEF (TTE 3/9/25 EF 60-65%), T2DM, biliary cirrhosis s/p liver transplant in 2002, EBV, HTN, HLD, Sjogren's syndrome, Basal Cell Carincoma s/p MOHS on 4/8/25, thyroid cancer s/p thyroidectomy in 2010, and diverticulitis s/p open sigmoidectomy and end colostomy on 5/30/25, with Pseudomonas VRE bacteremia who was admitted on 6/23/25 for evaluation of atrial fibrillation with rapid ventricular response.     Today:   -HR from 100-130 today  -Agreed to stay tonight for better rate control, however will need to go tomorrow because paying out of pocket to keep TCU bed open  -Additional 25mg of Metop Tartrate this evening      # Atrial Fibrillation with Rapid Ventricular Response  #Troponin elevation likely due to demand ischemia  # Hypertension  #Hyperlipidemia  # HFpEF (TTE 6/4/25 EF 60-65%)   # history of CVA  The patient reports she typically cannot tell when she is in afib but on the day of admission she had chest pressure. Heart rate elevated at 143bpm but vital signs otherwise stable on presentation to the ED. Troponin in the ED elevated but stable at 54 on recheck and ECG with afib with RVR. TSH within normal limits. Last echo on 3/9/25 with EF of 60-65% with normal global wall motion and mild aortic insufficiency. The patient has an implanted loop recorder and on report from 3/17/25 - 6/17/25, the device recorded 8 episodes of afib with RVR, 52 bradycardic episodes, and 60 episodes of afib with the patient being in persistent afib since 6/2/25. Following her last hospitalization, her metoprolol tartrate medication was held due to concern for hypotension. Etiology of Afib with RVR possibly due to hypovolemia with new ostomy  creation, hypertension with blood pressure medications held from previous hospitalization. Infection less likely as WBC within normal limits and CXR with no acute findings in the ED.   -Metop Succinate 50 daily for discharge, with PRN 25mg Tartrate for HR over 25mg   -Planning on close followup with Cardiology  -Patient has a loop recorder, knows to call Tylerchristopher for a report if she has concerns  - PTA apixaban 2.5mg BID  - PTA ezetimibe 10mg daily    - Strict I/Os   - Daily standing weights  - Telemetry  - Continuous pulse oximetry  - Daily BMP and magnesium     #Hyperkalemia   On presentation to the ED, potassium was 5.5. Given 500mL of NS in the ED. Will recheck following fluid bolus. Possibly elevated in the setting of recent sigmoidectomy and ostomy creation or with CKD. Will consider shifting if remains elevated or potassium further increases.   - Daily BMP      # Diverticulitis complicated by abscess status post open sigmoidectomy with end colostomy 5/30/25  # Sepsis due to Klebsiella pneumoniae and Pseudomonas aeruginosa and VRE bacteremia, likely of intra-abdominal origin   # Hypotension due to adrenal insufficiency/sepsis   Previous admission with diverticulitis complicated by abscess which required sigmoidectomy and colostomy.  Several IV antibiotics throughout hospitalization with ID following due to pseudomonas and VRE bacteremia. Completed course of antibiotics 6/12/25. Following with infectious disease and colorectal surgery. On admission, patient not meeting SIRS criteria and low suspicion for infection.   - PTA High dose Vitamin A x 30 days post-op (through 6/30)  - Fiber packet daily   - Pain regimen:   - PTA Acetaminophen 1,000mg BID    - PTA Gabapentin 300mg at bedtime      # Primary biliary cirrhosis s/p liver transplant 2002  # EBV +  # Sjogren's syndrome  - Consult transplant hepatology in AM  - Tacrolimus level in AM  - PTA Tacrolimus 2 mg BID suspension (allergy to capsule protein)  - PTA  "Prednisone 9 mg daily   - PTA ursodiol 250mg BID     # Normocytic Anemia   # Thrombocytosis   On presentation to ED, hemoglobin 8.5 and platelets 632. Hemoglobin improving since previous admission. Elevated platelets possibly reactive. Will continue to trend.   - Daily CBC   - PTA ferrous sulfate 324mg daily   - PTA folic acid 1,000mcg daily      # CKD3  # Bilateral stent placement per urology 5/29  Her baseline creatinine is 1.3-1.6. Upon presentation to ED, creatinine is 1.68.  - Daily BMP      # Type 2 diabetes  # Stress hyperglycemia    PTA on linagliptin. Last A1c 6.3 on 5/29/25.   - Hold linigliptin 5mg daily  - MDSSI  - Hypoglycemia protocol     # Thyroid cancer status post thyroidectomy 2010  - PTA calcitriol 0.25mcg  - PTA levothyroxine 175mcg      # Weakness and deconditioning of critical illness   Living at TCU prior to admission.   - PT/OT consult    #Depression  #Anxiety   - PTA sertraline 50mg daily          Diet: Combination Diet Regular Diet Adult  Diet    DVT Prophylaxis: DOAC  Ron Catheter: Not present  Fluids:   Lines: None     Cardiac Monitoring: ACTIVE order. Indication: Tachyarrhythmias, acute (48 hours)  Code Status: Full Code      Clinically Significant Risk Factors        # Hyperkalemia: Highest K = 5.5 mmol/L in last 2 days, will monitor as appropriate        # Hypoalbuminemia: Lowest albumin = 3.1 g/dL at 6/22/2025  9:44 PM, will monitor as appropriate  # Coagulation Defect: INR = 1.40 (Ref range: 0.85 - 1.15) and/or PTT = 35 Seconds (Ref range: 22 - 38 Seconds), will monitor for bleeding    # Hypertension: Noted on problem list            # Overweight: Estimated body mass index is 27.93 kg/m  as calculated from the following:    Height as of this encounter: 1.715 m (5' 7.5\").    Weight as of this encounter: 82.1 kg (181 lb)., PRESENT ON ADMISSION     # Financial/Environmental Concerns: none         Social Drivers of Health   Tobacco Use: Medium Risk (6/18/2025)    Patient History     " Smoking Tobacco Use: Former     Smokeless Tobacco Use: Never   Physical Activity: Insufficiently Active (11/9/2023)    Received from [facility]    Exercise Vital Sign     Days of Exercise per Week: 5 days     Minutes of Exercise per Session: 10 min         Disposition Plan     Medically Ready for Discharge: Anticipated Tomorrow         The patient's care was discussed with the Attending Physician, Dr. Mclain and Patient.    Lisa Ferreira MD  Medicine Service, [address]  Securely message with Vocera (more info)  Text page via markedup Paging/Directory   See signed in provider for up to date coverage information  ______________________________________________________________________    Interval History   No acute events overnight. Discussed plan for potential discharge with patient, she is eager to go because she is paying out of pocket ($800) for her rehab bed to stay open. However, she does not want to come right back with more of the chest pressure/RVR. We discussed getting her a sooner cardiology apt, she will call them today.     Per pt, called David to read loop recorder and in last 24 hours has had HR from .     She reports complete resolution of chest pressure since receiving Dilt. No return. No SOB/Pain/dizziness, subjective palpitations.     Physical Exam   Vital Signs: Temp: 98.3  F (36.8  C) Temp src: Oral BP: 111/72 Pulse: 106   Resp: 16 SpO2: 97 % O2 Device: None (Room air)    Weight: 181 lbs 0 oz    Gen: Comfortable appearing woman lying in bed, in no distress  HEENT: Atraumatic, nonicteric sclera. No teeth.   CV: Irregular rhythm, tachycardic, limbs well perfused. No accessory heart sounds.   Pulm: Clear to auscultation in all fields, no wheezing or crackles  Abd: Nontender, nondistended abdomen. Normal bowel sounds.   Skin: No lesions on exposed skin  Neuro: Appears alert and is appropriately conversational, moving all limbs  spontaneously  Psych: Appropriate to the situation      Medical Decision Making       Please see A&P for additional details of medical decision making.      Data     I have personally reviewed the following data over the past 24 hrs:    N/A  \   N/A   / N/A     N/A N/A N/A /  119 (H)   4.9 N/A N/A \       Imaging results reviewed over the past 24 hrs:   No results found for this or any previous visit (from the past 24 hours).

## 2025-06-24 NOTE — PLAN OF CARE
D: Pt admitted on 6/23/25 for evaluation of atrial fibrillation with rapid ventricular response.        I: Monitored vitals and assessed pt status.   Tele: Afib, HR 's  Mobility: Ax2 w/ lift     A: AOx4. VSS. Afebrile. Urinating adequately, primafit in place. Colostomy, adequate output, was seen by WOC today and bag was changed. Denies any pain.     P: Continue to monitor Pt status and report changes to Marf[facility] 3. Possible discharge back to TCU today, still pending.

## 2025-06-24 NOTE — PROGRESS NOTES
"Care Management Follow Up    Length of Stay (days): 2    Expected Discharge Date: 06/24/2025     Concerns to be Addressed: discharge planning     Patient plan of care discussed at interdisciplinary rounds: No    Anticipated Discharge Disposition:       [facility]  [address] [place] [address]  P: [phone]  Facility Adm P: [phone]  Facility Adm F: [phone]  Adm P: [phone]      Anticipated Discharge Services:    Anticipated Discharge DME:      Patient/family educated on Medicare website which has current facility and service quality ratings:    Education Provided on the Discharge Plan:    Patient/Family in Agreement with the Plan:      Referrals Placed by CM/SW:    Private pay costs discussed: transportation costs -see 6/23/2025  note    Discussed  Partnership in Safe Discharge Planning  document with patient/family: No     Handoff Completed: Yes, [facility] PCP: Internal handoff referral completed    Additional Information:    0918 Discharge orders sent via [facility] In Basket to [facility] Referral - Liatiffanie QUINONEZ updated via Teams    0931 SW sent [email or web address] message to Bedside RN Martine asking for confirmation pt is ready for discharge . Update acknowledged-    Transport Type:Wheel Chair  Indication/Need: w/c to be provided by transport    Ride Date: Anticipate 6/24 NOT YET SCHEDULED   Private Pay Cost Discussed: yes  PCS Completed:N/A  Her weight is 181 lb    0923 SW contacted [facility] (Ph: [phone]), spoke with Maria Luisa and secured Wheelchair transportation for WILL CALL pt pickup     0943 SW received update from RN: pt \"can't go until her HR sustains under 100 per team so will keep you posted as soon/if this happens\"    Per chart review, discharge is delayed until 6/25.    1710 [facility] Admission Liaison Gayatri updated via Teams    Next Steps:    Confirm discharge, send discharge orders  Update [facility] Transportation of discharge " time  SW will continue to follow as needed.    HUMA Chacon, LGSW    Emergency Department   [facility] Acute Care Management  Phone: [phone]  Searchable on Vocera: Adult ED SW [Martin]   Searchable on Vocera: Adult SW After Hours On Call [Multi Site Groups]

## 2025-06-24 NOTE — TELEPHONE ENCOUNTER
Pt called asking for Device to call her back with her HR's.    Pt states she is in the Emergency room and has been there for 2 days. MD's have told her she can go home once her HR's are below 110.   Pt states she feels this is unreasonable. States the MD's are too conservative giving her small doses of medication that are not enough to bring her HR's down.    Pt has been 100 % in Afib since 6/2/2025. We have been receiving daily alerts for afib with RVR  Per histogram since yesterday        Pt is very frustrated by being in the hospital. States she is paying 800.00 a day to hold her room at the TCU. She feels she can monitor this at home and needs to be discharged. Discussed that I am not in the position to influence anyone but that they would be able to see the HR graph above to help determine their coarse of action. Encouraged her to discuss this with the MD treating her.     After a long discussion reminded pt she always has the option to leave AMA. Pt states she will likely do this tomorrow if not discharged.   Jennifer LAZCANO

## 2025-06-24 NOTE — CONSULTS
[facility] Nurse Inpatient Assessment     Consulted for: colostomy    Summary: Colostomy 5/29/25, discharged to TCU with MCJ separation and retracted stoma. Having difficulty with pouch adherence and leakage at TCU.    [facility] nurse follow-up plan: 6/25 if still inpatient    Patient History (according to provider note(s):      Per H&P on 6/23/25 : Luz Thompson is a 75 year old female with a past medical history of atrial fibrillation, DVT on eliquis, CVA, CKD III, HFpEF (TTE 3/9/25 EF 60-65%), T2DM, biliary cirrhosis s/p liver transplant in 2002, EBV, HTN, HLD, Sjogren's syndrome, Basal Cell Carincoma s/p MOHS on 4/8/25, thyroid cancer s/p thyroidectomy in 2010, and diverticulitis s/p open sigmoidectomy and end colostomy on 5/30/25, with Pseudomonas VRE bacteremia who was admitted on 6/23/25 for evaluation of atrial fibrillation with rapid ventricular response.     Assessment:      Areas visualized during today's visit: Focused: abdomen    Assessment of Established end Colostomy:  How long has patient had ostomy: 5/29/25  Stoma: red, round, moist, retracted, and os pointed down and right  Mucutaneous junction:  circumferentially, more depth from 2-12 o'clock  Peristomal skin: Moisture-Associated Skin Damage (MASD) due to leakage   Output: gas, soft, and brown stool      6/24 stoma      6/24 stoma with abdomen pulled flat        Is patient independent with ostomy care?: No - has help from staff at TCU - says can empty pouch independently if wearing Coloplast pouch and given enough time  Home pouching system: prefers Coloplast (hard for her to close [place]), says TCU has been using a variety of supplies  Pouching issues and/or educational needs:Pouching system assessment , Evaluate leakage issue, Refitting of appliance , Adjustment of pouching plan, and Peristomal skin care  Interventions completed today: Pouching system assessment , Evaluate leakage  "issue, Refitting of appliance , Adjustment of pouching plan, Pouch change demonstration, and Ostomy accessory product use   Pouching system in use while hospitalized:  [place] two piece, convex, barrier ring, ostomy powder, and skin prep  Supplies location: gathered, ordered ostomy supplies, discussed with RN, and discussed with patient           Treatment Plan:     LLQ Colostomy pouching plan:   Pouching system: ostomy supplies pouches: [place] 57 FECAL ([id number]) ostomy supplies barrier: Marina 57mm SOFT CONVEX ([id number])  Accessories used: [facility] ostomy accessories: 2\" Cera Barrier Ring (297016), Powder ([id number]), and Cavilon no sting barrier film ([id number])     Apply stoma powder to any weepy open areas of skin around stoma.  Brush off all excess powder that does not stick. Apply Cavilon skin prep over powder - let dry before applying new pouch.    Pull abdomen flat when applying new pouch (makes the stoma a [place] shape instead of oval). (See photo from 6/23/25 in Media).     Frequency of pouch changes: Every other day  WOC follow up plan: Three times a week and Notify WOC if leakage occurs  Bedside RN interventions: Change pouch PRN if leaking using the supplies above, Empty pouch when 1/3 to 1/2 full, ensure to clean pouch outlet after emptying to prevent odor, Notify WOC for ongoing pouch leakage, and Document stoma appearance and output volume, color, and consistency every shift       Orders: Written    RECOMMEND PRIMARY TEAM ORDER: None, at this time  Education provided: plan of care and wound progress  Discussed plan of care with: Patient and Family  Notify WOC if wound(s) deteriorate.  Nursing to notify the Provider(s) and re-consult the WOC Nurse if new skin concern.    DATA:     Current support surface: Standard  Standard gel mattress (Isoflex)  Containment of urine/stool: Suction based external urinary catheter  and Colostomy pouch  BMI: Body mass index is 27.93 kg/m .   Active diet order: Orders Placed " This Encounter      Combination Diet Regular Diet Adult      Diet     Output: I/O last 3 completed shifts:  In: 120 [P.O.:120]  Out: 1225 [Urine:1000; Stool:225]     Labs:   Recent Labs   Lab 06/23/25  0543   ALBUMIN 3.2*   HGB 8.1*   INR 1.40*   WBC 10.2     Pressure injury risk assessment:   Sensory Perception: 3-->slightly limited  Moisture: 3-->occasionally moist  Activity: 2-->chairfast  Mobility: 3-->slightly limited  Nutrition: 3-->adequate  Friction and Shear: 2-->potential problem  Shashank Score: 16    Pager no longer is use, please contact through [employer] group: [facility] Nurse [place]   Dept. Office Number: [phone]    Katelyn Parikh RN CWOCN

## 2025-06-25 VITALS
WEIGHT: 181 LBS | HEIGHT: 68 IN | SYSTOLIC BLOOD PRESSURE: 122 MMHG | DIASTOLIC BLOOD PRESSURE: 82 MMHG | OXYGEN SATURATION: 94 % | BODY MASS INDEX: 27.43 KG/M2 | TEMPERATURE: 97.8 F | HEART RATE: 89 BPM | RESPIRATION RATE: 16 BRPM

## 2025-06-25 LAB
GLUCOSE BLDC GLUCOMTR-MCNC: 104 MG/DL (ref 70–99)
GLUCOSE BLDC GLUCOMTR-MCNC: 83 MG/DL (ref 70–99)
MAGNESIUM SERPL-MCNC: 2.1 MG/DL (ref 1.7–2.3)
POTASSIUM SERPL-SCNC: 5 MMOL/L (ref 3.4–5.3)

## 2025-06-25 PROCEDURE — 999N000111 HC STATISTIC OT IP EVAL DEFER

## 2025-06-25 PROCEDURE — 84132 ASSAY OF SERUM POTASSIUM: CPT | Performed by: STUDENT IN AN ORGANIZED HEALTH CARE EDUCATION/TRAINING PROGRAM

## 2025-06-25 PROCEDURE — 99239 HOSP IP/OBS DSCHRG MGMT >30: CPT | Mod: GC | Performed by: STUDENT IN AN ORGANIZED HEALTH CARE EDUCATION/TRAINING PROGRAM

## 2025-06-25 PROCEDURE — 36415 COLL VENOUS BLD VENIPUNCTURE: CPT | Performed by: PEDIATRICS

## 2025-06-25 PROCEDURE — 250N000012 HC RX MED GY IP 250 OP 636 PS 637

## 2025-06-25 PROCEDURE — 83735 ASSAY OF MAGNESIUM: CPT | Performed by: PEDIATRICS

## 2025-06-25 PROCEDURE — 250N000012 HC RX MED GY IP 250 OP 636 PS 637: Performed by: HOSPITALIST

## 2025-06-25 PROCEDURE — 82962 GLUCOSE BLOOD TEST: CPT

## 2025-06-25 PROCEDURE — 250N000013 HC RX MED GY IP 250 OP 250 PS 637

## 2025-06-25 PROCEDURE — 999N000147 HC STATISTIC PT IP EVAL DEFER

## 2025-06-25 RX ORDER — METOPROLOL TARTRATE 25 MG/1
25 TABLET, FILM COATED ORAL
Status: SHIPPED
Start: 2025-06-25 | End: 2025-06-26

## 2025-06-25 RX ORDER — METOPROLOL SUCCINATE 50 MG/1
50 TABLET, EXTENDED RELEASE ORAL DAILY
Status: SHIPPED
Start: 2025-06-25 | End: 2025-06-26

## 2025-06-25 RX ADMIN — GABAPENTIN 300 MG: 250 SOLUTION ORAL at 00:35

## 2025-06-25 RX ADMIN — URSODIOL 250 MG: 250 TABLET, FILM COATED ORAL at 08:36

## 2025-06-25 RX ADMIN — TACROLIMUS 2 MG: 5 CAPSULE ORAL at 08:36

## 2025-06-25 RX ADMIN — METOPROLOL SUCCINATE 50 MG: 25 TABLET, EXTENDED RELEASE ORAL at 08:35

## 2025-06-25 RX ADMIN — Medication 20000 UNITS: at 08:36

## 2025-06-25 RX ADMIN — Medication 1 CAPSULE: at 08:38

## 2025-06-25 RX ADMIN — TRIAMCINOLONE ACETONIDE: 1 OINTMENT TOPICAL at 08:40

## 2025-06-25 RX ADMIN — APIXABAN 2.5 MG: 2.5 TABLET, FILM COATED ORAL at 08:36

## 2025-06-25 RX ADMIN — FERROUS SULFATE TAB 325 MG (65 MG ELEMENTAL FE) 325 MG: 325 (65 FE) TAB at 10:34

## 2025-06-25 RX ADMIN — ACETAMINOPHEN 1000 MG: 500 TABLET ORAL at 01:04

## 2025-06-25 RX ADMIN — FOLIC ACID 1000 MCG: 1 TABLET ORAL at 08:36

## 2025-06-25 RX ADMIN — SERTRALINE HYDROCHLORIDE 50 MG: 50 TABLET ORAL at 08:35

## 2025-06-25 RX ADMIN — Medication 9 MG: at 08:46

## 2025-06-25 RX ADMIN — CALCITRIOL CAPSULES 0.25 MCG 0.25 MCG: 0.25 CAPSULE ORAL at 08:38

## 2025-06-25 RX ADMIN — LEVOTHYROXINE SODIUM 175 MCG: 0.17 TABLET ORAL at 08:37

## 2025-06-25 RX ADMIN — EZETIMIBE 10 MG: 10 TABLET ORAL at 08:38

## 2025-06-25 ASSESSMENT — ACTIVITIES OF DAILY LIVING (ADL)
ADLS_ACUITY_SCORE: 75

## 2025-06-25 NOTE — MEDICATION SCRIBE - ADMISSION MEDICATION HISTORY
Medication Scribe Admission Medication History    Admission medication history is complete. The information provided in this note is only as accurate as the sources available at the time of the update.    Information Source(s): Facility (U/NH/) medication list/MAR and [facility] via [facility]    Pertinent Information: per MAR+DRH, pt is taking medications on PTA medication list as directed.     Changes made to PTA medication list:  Added: None  Deleted: None  Changed: None    Allergies reviewed with patient and updates made in EHR: yes    Medication History Completed By: Maria Luisa Young 6/25/2025 3:07 AM    PTA Med List   Medication Sig Last Dose/Taking    acetaminophen (TYLENOL) 500 MG tablet Take 1,000 mg by mouth 2 times daily. During 4/16/25 med recc pt states take BID everyday 6/22/2025    albuterol (PROAIR HFA/PROVENTIL HFA/VENTOLIN HFA) 108 (90 Base) MCG/ACT inhaler Inhale 2 puffs into the lungs every 4 hours as needed for shortness of breath, wheezing or cough. Unknown    apixaban ANTICOAGULANT (ELIQUIS) 2.5 MG tablet Take 1 tablet (2.5 mg) by mouth 2 times daily. 6/22/2025    calcitRIOL (ROCALTROL) 0.25 MCG capsule Take 1 capsule (0.25 mcg) by mouth daily. 6/22/2025    calcium carbonate (TUMS) 500 MG chewable tablet Take 2 tablets (1,000 mg) by mouth every 4 hours as needed for heartburn. Unknown    D-MANNOSE PO Take 1 Scoop by mouth 2 times daily. 6/22/2025 Morning    diphenhydrAMINE (BENADRYL) 25 MG tablet Take 25 mg by mouth every 6 hours as needed for itching or allergies. Unknown    EPINEPHrine (ANY BX GENERIC EQUIV) 0.3 MG/0.3ML injection 2-pack Inject 0.3 mLs (0.3 mg) into the muscle as needed for anaphylaxis. May repeat one time in 5-15 minutes if response to initial dose is inadequate. Taking As Needed    estradiol (ESTRACE) 0.1 MG/GM vaginal cream Place vaginally three times a week. 6/20/2025 Bedtime    ezetimibe (ZETIA) 10 MG tablet Take 1 tablet (10 mg) by mouth daily. 6/22/2025 Morning    Ferrous  Sulfate 324 MG TBEC Take 1 tablet by mouth daily. 6/22/2025 Morning    gabapentin (NEURONTIN) 250 MG/5ML solution Take 6 mLs (300 mg) by mouth at bedtime 6/22/2025 Bedtime    glucose (BD GLUCOSE) 5 g chewable tablet Take 2 tablets (10 g) by mouth as needed (low blood sugar) Unknown    glucose 40 % (400 mg/mL) gel Take 15 g by mouth every 15 minutes as needed for low blood sugar. Unknown    levothyroxine (SYNTHROID/LEVOTHROID) 175 MCG tablet Take 1 tablet (175 mcg) by mouth daily. 6/22/2025 Morning    linagliptin (TRADJENTA) 5 MG TABS tablet Take 1 tablet (5 mg) by mouth daily. 6/22/2025 Morning    metoprolol succinate ER (TOPROL XL) 50 MG 24 hr tablet Take 1 tablet (50 mg) by mouth daily. Taking    Multiple Vitamins-Minerals (PRESERVISION AREDS 2) CAPS Take 1 capsule by mouth 2 times daily 6/22/2025 Bedtime    Nutrisource Fiber PO packet Take 1 packet by mouth daily. Benefiber 6/17/2025    omega-3 acid ethyl esters (LOVAZA) 1 g capsule Take 1 capsule (1 g) by mouth 2 times daily. 6/22/2025 Bedtime    ondansetron (ZOFRAN) 4 MG tablet Take 1 tablet (4 mg) by mouth every 6 hours as needed for nausea. 6/18/2025 Morning    oxymetazoline (AFRIN) 0.05 % nasal spray Spray 0.2 mLs (2 sprays) into both nostrils 2 times daily as needed for congestion. Unknown    predniSONE (DELTASONE) 1 MG tablet Take 4 tablets (4 mg) by mouth daily. Take 4mg (1mg tablets x4) and Take with 5mg tablet to equal 9mg daily 6/22/2025 Morning    predniSONE (DELTASONE) 5 MG tablet Take 1 tablet (5 mg) by mouth daily. Take with 5mg tablet with 4mg (1mg tablets x4) to equal 9mg daily 6/22/2025 Morning    sertraline (ZOLOFT) 50 MG tablet Take 1 tablet (50 mg) by mouth daily. 6/22/2025 Morning    tacrolimus (GENERIC) 1 mg/mL suspension Take 2 mLs (2 mg) by mouth 2 times daily. 6/22/2025 Bedtime    triamcinolone (KENALOG) 0.1 % external ointment Apply topically 2 times daily. 6/22/2025 Bedtime    ursodiol (ACTIGALL) 250 MG tablet Take 1 tablet (250 mg) by  mouth 2 times daily. 6/22/2025 Bedtime    vitamin A 3 MG (19967 UNITS) capsule Take 2 capsules (20,000 Units) by mouth daily for 18 days. 6/22/2025 Morning

## 2025-06-25 NOTE — PLAN OF CARE
Physical Therapy defer - Orders received, chart reviewed, discussed with care team. Per chart and discussion with SW, patient with bed hold at TCU where they plan to return today. Will complete consult and defer evaluation.

## 2025-06-25 NOTE — PLAN OF CARE
Goal Outcome Evaluation: [id number]      Plan of Care Reviewed With: patient    Overall Patient Progress: improving      Pt is alert and oriented x4. Denies pain. Regular diet with good appetite. No c/o pain. PIV/SL. HR remains between  this shift. New changes with medication for HR given. Midline incision is TATO. Voiding well. Colostomy bag is intact and was changed today by WOC. Pt is for possible discharge [place].

## 2025-06-25 NOTE — PLAN OF CARE
Occupational Therapy: Orders received. Chart reviewed and discussed with care team.? Occupational Therapy not indicated as pt has plan to return to TCU.? Defer discharge recommendations to medical team.? Will complete orders.

## 2025-06-25 NOTE — PLAN OF CARE
Nursing note      Plan of Care Reviewed With: patient    Overall Patient Progress: improving    Outcome Evaluation: Pt remains AVSS on RA. Placed on 2L O2 via NC for desats while sleeping. Persistent A. Fib.Rates 70-100s. Provider notified for 5 beat run of HR in the 30s. Adequate UOP via primafit. Colostomy, adequate output. Midline abd incision remains O2A. Refused some repositioning overnight so that she could sleep, education provided. Plan: hopeful discharge 6/25.

## 2025-06-25 NOTE — PLAN OF CARE
Goal Outcome Evaluation:      Plan of Care Reviewed With: patient    Overall Patient Progress: improving    [id number]     Neuro: A&O x4, able to make needs known.   Cardiac: A.fib. Denied chest pain, dizziness, palpitations. VSS, afebrile.   Respiratory: RA, sating >92%. Denied SOB.   GI/: Voiding via external cath with adequate output. Colostomy bag with minimal output.   Diet/Appetite: Regular diet, okay appetite.  Skin: No new skin deficits.   LDA: discontinued.   Activity: Ax2 w/lift   Pain: Denies pain  Plan: Plan to discharge back to TCU today. Notify [place] [facility] of pertinent changes.    Nurse to nurse reported given to TCU nurse.   Discharge instructions given to patient. RN provided education about cares of ostomy and when to call 911/provider with symptoms or concerns. Patent stated no questions or concerns at the time of discharge.      Danielito Maddox RN on 6/25/2025 at 1:33 PM

## 2025-06-25 NOTE — PROGRESS NOTES
Care Management Discharge Note    Discharge Date: 06/25/2025       Discharge Disposition: Transitional Care    Discharge Services: None    Discharge DME: None    Discharge Transportation: agency    Private pay costs discussed: private room/amenity fees, transportation costs, and insurance costs out of pocket expenses    Does the patient's insurance plan have a 3 day qualifying hospital stay waiver?  No    PAS Confirmation Code: [id number]/[id number]  Patient/family educated on Medicare website which has current facility and service quality ratings: yes    Education Provided on the Discharge Plan: Yes  Persons Notified of Discharge Plans: patient, bedside nurse, attending team  Patient/Family in Agreement with the Plan:  yes    Handoff Referral Completed: Yes, AMANDA PCP: Internal handoff referral completed    Additional Information:  Writer informed patient is ready to discharge. Writer completed will-call ride arranged through [facility] transport. Writer informed patient and KIM Mendieta.    [employer]: [phone]  Transport within the hour.    ________________    HUMA Moore, LGSW  ED/Observation   [facility]  Phone: [phone]  Fax: [phone]    After hours [employer] and After Hours [employer]  Available from 4:00pm - 8:30pm

## 2025-06-26 ENCOUNTER — PATIENT OUTREACH (OUTPATIENT)
Dept: CARE COORDINATION | Facility: CLINIC | Age: 75
End: 2025-06-26

## 2025-06-26 ENCOUNTER — TRANSITIONAL CARE UNIT VISIT (OUTPATIENT)
Dept: GERIATRICS | Facility: CLINIC | Age: 75
End: 2025-06-26
Payer: MEDICARE

## 2025-06-26 ENCOUNTER — TELEPHONE (OUTPATIENT)
Dept: TRANSPLANT | Facility: CLINIC | Age: 75
End: 2025-06-26

## 2025-06-26 VITALS
TEMPERATURE: 98.2 F | OXYGEN SATURATION: 93 % | RESPIRATION RATE: 18 BRPM | SYSTOLIC BLOOD PRESSURE: 121 MMHG | DIASTOLIC BLOOD PRESSURE: 63 MMHG | HEART RATE: 96 BPM | BODY MASS INDEX: 28.85 KG/M2 | WEIGHT: 183.8 LBS | HEIGHT: 67 IN

## 2025-06-26 DIAGNOSIS — Z86.19 HX OF SEPSIS: ICD-10-CM

## 2025-06-26 DIAGNOSIS — Z93.3 COLOSTOMY PRESENT (H): ICD-10-CM

## 2025-06-26 DIAGNOSIS — Z98.890 S/P MOHS SURGERY FOR BASAL CELL CARCINOMA: ICD-10-CM

## 2025-06-26 DIAGNOSIS — N18.32 TYPE 2 DIABETES MELLITUS WITH STAGE 3B CHRONIC KIDNEY DISEASE, WITHOUT LONG-TERM CURRENT USE OF INSULIN (H): ICD-10-CM

## 2025-06-26 DIAGNOSIS — I48.91 ATRIAL FIBRILLATION WITH RVR (H): ICD-10-CM

## 2025-06-26 DIAGNOSIS — C44.329 SQUAMOUS CELL CANCER OF SKIN OF LEFT CHEEK: ICD-10-CM

## 2025-06-26 DIAGNOSIS — N17.0 ACUTE TUBULAR NECROSIS: ICD-10-CM

## 2025-06-26 DIAGNOSIS — D72.829 LEUKOCYTOSIS, UNSPECIFIED TYPE: ICD-10-CM

## 2025-06-26 DIAGNOSIS — D84.9 IMMUNOCOMPROMISED: ICD-10-CM

## 2025-06-26 DIAGNOSIS — Z91.81 PERSONAL HISTORY OF FALL: ICD-10-CM

## 2025-06-26 DIAGNOSIS — N18.9 ACUTE KIDNEY INJURY SUPERIMPOSED ON CKD: ICD-10-CM

## 2025-06-26 DIAGNOSIS — M62.81 GENERALIZED MUSCLE WEAKNESS: ICD-10-CM

## 2025-06-26 DIAGNOSIS — M54.50 BILATERAL LOW BACK PAIN WITHOUT SCIATICA, UNSPECIFIED CHRONICITY: ICD-10-CM

## 2025-06-26 DIAGNOSIS — K59.01 SLOW TRANSIT CONSTIPATION: ICD-10-CM

## 2025-06-26 DIAGNOSIS — R09.02 HYPOXIA: ICD-10-CM

## 2025-06-26 DIAGNOSIS — Z90.49 S/P LAPAROSCOPIC-ASSISTED SIGMOIDECTOMY: ICD-10-CM

## 2025-06-26 DIAGNOSIS — Z93.3 S/P COLOSTOMY (H): ICD-10-CM

## 2025-06-26 DIAGNOSIS — K21.00 GASTROESOPHAGEAL REFLUX DISEASE WITH ESOPHAGITIS, UNSPECIFIED WHETHER HEMORRHAGE: ICD-10-CM

## 2025-06-26 DIAGNOSIS — Z86.73 HISTORY OF TIA (TRANSIENT ISCHEMIC ATTACK) AND STROKE: ICD-10-CM

## 2025-06-26 DIAGNOSIS — R06.02 SOB (SHORTNESS OF BREATH): ICD-10-CM

## 2025-06-26 DIAGNOSIS — I95.89 OTHER SPECIFIED HYPOTENSION: ICD-10-CM

## 2025-06-26 DIAGNOSIS — N18.32 STAGE 3B CHRONIC KIDNEY DISEASE (H): ICD-10-CM

## 2025-06-26 DIAGNOSIS — M35.00 SJOGREN'S SYNDROME, WITH UNSPECIFIED ORGAN INVOLVEMENT: ICD-10-CM

## 2025-06-26 DIAGNOSIS — I10 HYPERTENSION GOAL BP (BLOOD PRESSURE) < 140/80: ICD-10-CM

## 2025-06-26 DIAGNOSIS — K57.32 DIVERTICULITIS OF COLON: Primary | ICD-10-CM

## 2025-06-26 DIAGNOSIS — R11.0 NAUSEA: ICD-10-CM

## 2025-06-26 DIAGNOSIS — M19.90 OSTEOARTHRITIS, UNSPECIFIED OSTEOARTHRITIS TYPE, UNSPECIFIED SITE: ICD-10-CM

## 2025-06-26 DIAGNOSIS — F32.A DEPRESSION, UNSPECIFIED DEPRESSION TYPE: ICD-10-CM

## 2025-06-26 DIAGNOSIS — I95.9 HYPOTENSION, UNSPECIFIED HYPOTENSION TYPE: ICD-10-CM

## 2025-06-26 DIAGNOSIS — Z85.828 S/P MOHS SURGERY FOR BASAL CELL CARCINOMA: ICD-10-CM

## 2025-06-26 DIAGNOSIS — N93.9 VAGINAL BLEEDING: ICD-10-CM

## 2025-06-26 DIAGNOSIS — G47.33 OSA (OBSTRUCTIVE SLEEP APNEA): ICD-10-CM

## 2025-06-26 DIAGNOSIS — B99.9 INTRA-ABDOMINAL INFECTION: ICD-10-CM

## 2025-06-26 DIAGNOSIS — R04.0 EPISTAXIS: ICD-10-CM

## 2025-06-26 DIAGNOSIS — M81.0 AGE-RELATED OSTEOPOROSIS WITHOUT CURRENT PATHOLOGICAL FRACTURE: ICD-10-CM

## 2025-06-26 DIAGNOSIS — E78.5 HYPERLIPIDEMIA LDL GOAL <70: ICD-10-CM

## 2025-06-26 DIAGNOSIS — M54.50 LOW BACK PAIN, UNSPECIFIED BACK PAIN LATERALITY, UNSPECIFIED CHRONICITY, UNSPECIFIED WHETHER SCIATICA PRESENT: ICD-10-CM

## 2025-06-26 DIAGNOSIS — G89.29 CHRONIC SHOULDER PAIN, UNSPECIFIED LATERALITY: ICD-10-CM

## 2025-06-26 DIAGNOSIS — Z94.4 STATUS POST LIVER TRANSPLANTATION (H): ICD-10-CM

## 2025-06-26 DIAGNOSIS — R53.81 PHYSICAL DECONDITIONING: ICD-10-CM

## 2025-06-26 DIAGNOSIS — N39.0 URINARY TRACT INFECTION: ICD-10-CM

## 2025-06-26 DIAGNOSIS — E03.9 HYPOTHYROIDISM, UNSPECIFIED TYPE: ICD-10-CM

## 2025-06-26 DIAGNOSIS — C73 PAPILLARY THYROID CARCINOMA (H): ICD-10-CM

## 2025-06-26 DIAGNOSIS — E11.22 TYPE 2 DIABETES MELLITUS WITH STAGE 3B CHRONIC KIDNEY DISEASE, WITHOUT LONG-TERM CURRENT USE OF INSULIN (H): ICD-10-CM

## 2025-06-26 DIAGNOSIS — D69.6 THROMBOCYTOPENIA: ICD-10-CM

## 2025-06-26 DIAGNOSIS — D64.9 CHRONIC ANEMIA: ICD-10-CM

## 2025-06-26 DIAGNOSIS — M25.519 CHRONIC SHOULDER PAIN, UNSPECIFIED LATERALITY: ICD-10-CM

## 2025-06-26 DIAGNOSIS — D64.9 ACUTE ON CHRONIC ANEMIA: ICD-10-CM

## 2025-06-26 DIAGNOSIS — E46 PROTEIN-CALORIE MALNUTRITION, UNSPECIFIED SEVERITY: ICD-10-CM

## 2025-06-26 DIAGNOSIS — G47.34 NOCTURNAL HYPOXEMIA: ICD-10-CM

## 2025-06-26 DIAGNOSIS — N17.9 ACUTE KIDNEY INJURY SUPERIMPOSED ON CKD: ICD-10-CM

## 2025-06-26 DIAGNOSIS — I50.32 CHRONIC DIASTOLIC HEART FAILURE (H): ICD-10-CM

## 2025-06-26 RX ORDER — METOPROLOL SUCCINATE 50 MG/1
50 TABLET, EXTENDED RELEASE ORAL DAILY
Status: SHIPPED
Start: 2025-06-26

## 2025-06-26 RX ORDER — METOPROLOL TARTRATE 25 MG/1
25 TABLET, FILM COATED ORAL 3 TIMES DAILY PRN
Status: SHIPPED
Start: 2025-06-26

## 2025-06-26 RX ORDER — LANOLIN ALCOHOL/MO/W.PET/CERES
CREAM (GRAM) TOPICAL
Qty: 57 G | Refills: 1 | Status: SHIPPED | OUTPATIENT
Start: 2025-06-26

## 2025-06-26 NOTE — PROGRESS NOTES
Clinic Care Coordination Contact  Care Coordination Transition Communication    Clinical Data: Patient was hospitalized at [facility] from 5/28/25 to 6/25/25 with diagnosis of diverticulitis of the large intestine, generalized weakness.     Assessment: Patient has transitioned to TCU/ARU for short term rehabilitation:    Facility Name: [facility]  Transition Communication:  Notified facility of Primary Care- Care Coordination support via Epic fax.    Plan: Care Coordinator will await notification from facility staff informing of patient's discharge plans/needs. Care Coordinator will review chart and outreach to facility staff every 4 weeks and as needed.     Alpa Cho RN, BSN, CPHN, CCM  [facility] Amb[facility] Care Management  [facility]  [email or web address]  Office: [phone]  Employed by [facility]

## 2025-06-26 NOTE — DISCHARGE SUMMARY
"[facility]  Discharge Summary - Medicine & Pediatrics       Date of Admission:  6/22/2025  Date of Discharge:  6/25/2025  1:27 PM  Discharging Provider: Dr. Milton Mclain  Discharge Service: Medicine Service, JOVANNY TEAM 3    Discharge Diagnoses   # Atrial Fibrillation with Rapid Ventricular Response  #Troponin elevation likely due to demand ischemia  # Hypertension  #Hyperlipidemia  # HFpEF (TTE 6/4/25 EF 60-65%)   # history of CVA  #Hyperkalemia  # Diverticulitis complicated by abscess status post open sigmoidectomy with end colostomy 5/30/25  # Sepsis due to Klebsiella pneumoniae and Pseudomonas aeruginosa and VRE bacteremia, likely of intra-abdominal origin   # Hypotension due to adrenal insufficiency/sepsis   # Primary biliary cirrhosis s/p liver transplant 2002  # EBV +  # Sjogren's syndrome  # Normocytic Anemia   # Thrombocytosis   # CKD3  # Bilateral stent placement per urology 5/29  # Type 2 diabetes  # Stress hyperglycemia    # Thyroid cancer status post thyroidectomy 2010  # Weakness and deconditioning of critical illness   #Depression  #Anxiety       Clinically Significant Risk Factors     # Overweight: Estimated body mass index is 27.93 kg/m  as calculated from the following:    Height as of this encounter: 1.715 m (5' 7.5\").    Weight as of this encounter: 82.1 kg (181 lb).       Follow-ups Needed After Discharge   Follow-up Appointments       Follow Up and recommended labs and tests      Follow up as previously scheduled            Needs to see cardiology in next month.     Unresulted Labs Ordered in the Past 30 Days of this Admission       Date and Time Order Name Status Description    5/29/2025 10:31 AM Prepare red blood cells (unit) Preliminary     5/29/2025 10:31 AM Prepare red blood cells (unit) Preliminary         These results will be followed up by Primary Care.     Discharge Disposition   Discharged to short-term care facility  Condition at " discharge: Stable    Hospital Course   Luz Thompson was admitted on 6/22/2025 for atrial fibrillation with RVR, admitted with rates from 130-140 after not having any metoprolol since last discharge.  The following problems were addressed during her hospitalization:    # Atrial Fibrillation with Rapid Ventricular Response  #Troponin elevation likely due to demand ischemia  # Hypertension  #Hyperlipidemia  # HFpEF (TTE 6/4/25 EF 60-65%)   # history of CVA  The patient reports she typically cannot tell when she is in afib but on the day of admission she had chest pressure. Heart rate elevated at 143bpm but vital signs otherwise stable on presentation to the ED. She was started on metoprolol tartrate 25mg daily BID and then transitioned to metoprolol tartrate 50mg once daily in the morning. She will be sent with a PRN 25mg Metoprolol Tartrate- to be given with sustained HR over 120. This can be assessed by checking heart rate in her neck or wrist, or calling [facility] [employer] for her loop recorder to be read.      #Hyperkalemia   On presentation to the ED, potassium was 5.5. Given 500mL of NS in the ED. Will recheck following fluid bolus. Possibly elevated in the setting of recent sigmoidectomy and ostomy creation or with CKD. Improved after a little bit of fluid, should continue to drink water.      # Diverticulitis complicated by abscess status post open sigmoidectomy with end colostomy 5/30/25  # Sepsis due to Klebsiella pneumoniae and Pseudomonas aeruginosa and VRE bacteremia, likely of intra-abdominal origin   # Hypotension due to adrenal insufficiency/sepsis   Previous admission with diverticulitis complicated by abscess which required sigmoidectomy and colostomy.  Several IV antibiotics throughout hospitalization with ID following due to pseudomonas and VRE bacteremia. Completed course of antibiotics 6/12/25. Following with infectious disease and colorectal surgery. On admission, patient not meeting SIRS criteria  and low suspicion for infection.   - PTA High dose Vitamin A x 30 days post-op (through 6/30)  - Fiber packet daily   - Pain regimen:              - PTA Acetaminophen 1,000mg BID               - PTA Gabapentin 300mg at bedtime      # Primary biliary cirrhosis s/p liver transplant 2002  # EBV +  # Sjogren's syndrome  - Consult transplant hepatology in AM  - Tacrolimus level in AM  - PTA Tacrolimus 2 mg BID suspension (allergy to capsule protein)  - PTA Prednisone 9 mg daily   - PTA ursodiol 250mg BID     # Normocytic Anemia   # Thrombocytosis   On presentation to ED, hemoglobin 8.5 and platelets 632. Hemoglobin improving since previous admission. Elevated platelets possibly reactive. Will continue to trend.   - Daily CBC   - PTA ferrous sulfate 324mg daily   - PTA folic acid 1,000mcg daily      # CKD3  # Bilateral stent placement per urology 5/29  Her baseline creatinine is 1.3-1.6. Upon presentation to ED, creatinine is 1.68.  - Daily BMP      # Type 2 diabetes  # Stress hyperglycemia    PTA on linagliptin. Last A1c 6.3 on 5/29/25.   - Hold linigliptin 5mg daily  - MDSSI  - Hypoglycemia protocol     # Thyroid cancer status post thyroidectomy 2010  - PTA calcitriol 0.25mcg  - PTA levothyroxine 175mcg      # Weakness and deconditioning of critical illness   Living at TCU prior to admission.   - PT/OT consult     #Depression  #Anxiety   - PTA sertraline 50mg daily      Consultations This Hospital Stay   CARE MANAGEMENT / SOCIAL WORK IP CONSULT  GI HEPATOLOGY ADULT IP CONSULT  PHYSICAL THERAPY ADULT IP CONSULT  OCCUPATIONAL THERAPY ADULT IP CONSULT  CONSULT FOR INPATIENT VASCULAR ACCESS CARE  WOUND OSTOMY CONTINENCE NURSE  IP CONSULT  PHYSICAL THERAPY ADULT IP CONSULT  OCCUPATIONAL THERAPY ADULT IP CONSULT    Code Status   Full Code       The patient was discussed with Dr. Mclain.    Lisa Ferreira MD  [facility] EMERGENCY DEPARTMENT  [address] [facility] [address]  Phone:  "[phone]  ______________________________________________________________________    Physical Exam   Vital Signs: Temp: 97.8  F (36.6  C) Temp src: Oral BP: 122/82 Pulse: 89   Resp: 16 SpO2: 94 % O2 Device: None (Room air) Oxygen Delivery: 2 LPM  Weight: 181 lbs 0 oz  Gen: Comfortable appearing woman lying in bed, in no distress  HEENT: Atraumatic, nonicteric sclera. No teeth.   CV: Irregular rhythm, tachycardic, limbs well perfused. No accessory heart sounds.   Pulm: Clear to auscultation in all fields, no wheezing or crackles  Abd: Nontender, nondistended abdomen. Normal bowel sounds.   Skin: No lesions on exposed skin  Neuro: Appears alert and is appropriately conversational, moving all limbs spontaneously  Psych: Appropriate to the situation    Primary Care Physician   Daniel Hidalgo    Discharge Orders      Wound care and ostomy supplies    LLQ Colostomy pouching plan:   Pouching system: ostomy supplies pouches: Boqueron 57 FECAL ([id number]) ostomy supplies barrier: Boqueron 57mm SOFT CONVEX ([id number])  Accessories used: [facility] ostomy accessories: 2\" Cera Barrier Ring ([id number]), Powder ([id number]), and Cavilon no sting barrier film ([id number])      Apply stoma powder to any weepy open areas of skin around stoma.  Brush off all excess powder that does not stick. Apply Cavilon skin prep over powder - let dry before applying new pouch.     Pull abdomen flat when applying new pouch (makes the stoma a Cantwell shape instead of oval). (See photo from 6/23/25 in Media).      Frequency of pouch changes: Every other day     Primary Care - Care Coordination Referral      General info for SNF    Length of Stay Estimate: Short Term Care: Estimated # of Days <30  Condition at Discharge: Stable  Level of care:skilled   Rehabilitation Potential: Good  Admission H&P remains valid and up-to-date: Yes  Recent Chemotherapy: N/A  Use Nursing Home Standing Orders: Yes     Follow Up and recommended labs and tests    Follow up as previously " scheduled     Tubes and Drains    Current Tubes and Drains:     Drain  Duration           Ostomy Colostomy LLQ 25 days    Urinary Drain 06/22/25 External Catheter 1 day              Reason for your hospital stay    Dear Luz Thompson    You were hospitalized at [facility] with afib with RVR and treated with diltiazam and metoprolol.  Over this hospitalization your symptoms improved and today you are ready to be discharged.  If you continue metoprolol daily therapy you should continue to improve but if you develop racing heart rate not responsive to metoprolol, fever, shortness of breath, light headedness, chest pain, severe vomiting or any other worrisome symptoms, please seek medical attention.    Please review your discharge medication list carefully. In brief, the most important changes include:  -Starting Metoprolol Succinate 50mg daily    Please set up an appointment with:  -Outpatient Cardiology EP for rate control    It was a pleasure meeting with you today. Thank you for allowing me and my team the privilege of caring for you. Take care!  Lisa Ferreira MD  [facility]  Internal Medicine-Pediatrics PGY-2     Daily weights    Call Provider for weight gain of more than 2 pounds per day or 5 pounds per week.     Colostomy; Midline Care    ~ Encourage patient participation with ostomy care,  ~ Empty pouch when 1/3 to 1/2 full,   ~ Notify WOC for ongoing ostomy pouch leakage,  ~ Document stoma output volume, color, consistency EVERY shift     Activity - Up with nursing assistance     Full Code     Physical Therapy Adult Consult    Evaluate and treat as clinically indicated.    Reason:  Deconditioning, prolonged hospital stay     Occupational Therapy Adult Consult    Evaluate and treat as clinically indicated.    Reason:  Deconditioning, prolonged hospital stay     Contact Isolation     Diet    Follow this diet upon discharge: Current Diet:Orders Placed This Encounter       Combination Diet Regular Diet Adult       Significant Results and Procedures   Most Recent 3 CBC's:  Recent Labs   Lab Test 06/23/25  0543 06/22/25 2144 06/20/25  1047 06/16/25  1038   WBC 10.2 10.5 8.4 8.8   HGB 8.1* 8.5* 7.5* 7.5*   MCV 97 96 96 95   * 632*  --  210     Most Recent 3 BMP's:  Recent Labs   Lab Test 06/25/25  1152 06/25/25  0831 06/25/25  0811 06/24/25  2221 06/24/25  0806 06/24/25  0701 06/23/25  0552 06/23/25  0543 06/22/25 2144 06/20/25  1047   NA  --   --   --   --   --   --   --  141 137  137 138   POTASSIUM  --   --  5.0  --   --  4.9  --  4.6 5.5*  5.5* 4.6   CHLORIDE  --   --   --   --   --   --   --  107 104  104 103   CO2  --   --   --   --   --   --   --  22 19*  19* 23   BUN  --   --   --   --   --   --   --  38.0* 41.0*  41.0* 40.9*   CR  --   --   --   --   --   --   --  1.56* 1.68*  1.68* 1.86*   ANIONGAP  --   --   --   --   --   --   --  12 14  14 12   KEILY  --   --   --   --   --   --   --  8.7* 8.9  8.9 8.5*   * 83  --  153*   < >  --    < > 89 180*  180* 92    < > = values in this interval not displayed.       Discharge Medications      Review of your medicines        START taking        Dose / Directions   metoprolol succinate ER 50 MG 24 hr tablet  Commonly known as: TOPROL XL  Used for: Atrial fibrillation with RVR (H)      Dose: 50 mg  Take 1 tablet (50 mg) by mouth daily.  Refills: 0     metoprolol tartrate 25 MG tablet  Commonly known as: LOPRESSOR  Used for: Atrial fibrillation with RVR (H)      Dose: 25 mg  Take 1 tablet (25 mg) by mouth every 2 hours as needed (For HR over 120 for >5 minutes and symptoms of chest pressure, pain, or dizziness take x1. May take again in 2 hours if needed.). If HR remains high after 2 doses of this medication, please contact a medical provider  Refills: 0            CONTINUE these medicines which have NOT CHANGED        Dose / Directions   acetaminophen 500 MG tablet  Commonly known as: TYLENOL      Dose: 1,000  mg  Take 1,000 mg by mouth 2 times daily. During 4/16/25 [facility] pt states take BID everyday  Refills: 0     albuterol 108 (90 Base) MCG/ACT inhaler  Commonly known as: PROAIR HFA/PROVENTIL HFA/VENTOLIN HFA  Used for: Pleural effusion, SOB (shortness of breath)      Dose: 2 puff  Inhale 2 puffs into the lungs every 4 hours as needed for shortness of breath, wheezing or cough.  Quantity: 18 g  Refills: 0     apixaban ANTICOAGULANT 2.5 MG tablet  Commonly known as: ELIQUIS  Used for: PAF (paroxysmal atrial fibrillation) (H)      Dose: 2.5 mg  Take 1 tablet (2.5 mg) by mouth 2 times daily.  Quantity: 60 tablet  Refills: 0     calcitRIOL 0.25 MCG capsule  Commonly known as: ROCALTROL  Used for: Papillary thyroid carcinoma (H)      Dose: 0.25 mcg  Take 1 capsule (0.25 mcg) by mouth daily.  Quantity: 90 capsule  Refills: 1     calcium carbonate 500 MG chewable tablet  Commonly known as: TUMS  Used for: Gastroesophageal reflux disease without esophagitis, Nausea      Dose: 2 chew tab  Take 2 tablets (1,000 mg) by mouth every 4 hours as needed for heartburn.  Refills: 0     D-MANNOSE PO      Dose: 1 Scoop  Take 1 Scoop by mouth 2 times daily.  Refills: 0     diphenhydrAMINE 25 MG tablet  Commonly known as: BENADRYL      Dose: 25 mg  Take 25 mg by mouth every 6 hours as needed for itching or allergies.  Refills: 0     EPINEPHrine 0.3 MG/0.3ML injection 2-pack  Commonly known as: ANY BX GENERIC EQUIV  Used for: Anaphylaxis, sequela      Dose: 0.3 mg  Inject 0.3 mLs (0.3 mg) into the muscle as needed for anaphylaxis. May repeat one time in 5-15 minutes if response to initial dose is inadequate.  Quantity: 2 each  Refills: 1     estradiol 0.1 MG/GM vaginal cream  Commonly known as: ESTRACE      Place vaginally three times a week.  Refills: 0     ezetimibe 10 MG tablet  Commonly known as: ZETIA  Used for: Benign essential hypertension, Hyperlipidemia LDL goal <70      Dose: 10 mg  Take 1 tablet (10 mg) by mouth  daily.  Quantity: 90 tablet  Refills: 3     Ferrous Sulfate 324 MG Tbec      Dose: 1 tablet  Take 1 tablet by mouth daily.  Refills: 0     folic acid 1 MG tablet  Commonly known as: FOLVITE  Used for: Liver replaced by transplant (H)      Dose: 1,000 mcg  TAKE 1 TABLET BY MOUTH DAILY  Quantity: 90 tablet  Refills: 3     FreeStyle Neil 2 Sensor Misc  Used for: Postoperative hypothyroidism, Papillary thyroid carcinoma (H), Type 2 diabetes mellitus with stage 3 chronic kidney disease, without long-term current use of insulin (H)      Change every 14 days.  Quantity: 2 each  Refills: 5     gabapentin 250 MG/5ML solution  Commonly known as: NEURONTIN  Used for: Liver replaced by transplant (H)      Dose: 300 mg  Take 6 mLs (300 mg) by mouth at bedtime  Quantity: 180 mL  Refills: 0     Glucagon 1 MG/0.2ML pen  Commonly known as: GVOKE HYPOPEN  Used for: Type 2 diabetes mellitus with stage 3b chronic kidney disease, without long-term current use of insulin (H)      Inject the contents of 1 device under the skin into lower abdomen, outer thigh, or outer upper arm as needed for hypoglycemia. If no response after 15 minutes, additional 1 mg dose from a new device may be injected while waiting for emergency assistance.  Refills: 0     * glucose 5 g chewable tablet  Commonly known as: BD GLUCOSE  Used for: Type 2 diabetes mellitus with stage 3 chronic kidney disease, without long-term current use of insulin (H)      Dose: 2 tablet  Take 2 tablets (10 g) by mouth as needed (low blood sugar)  Quantity: 40 tablet  Refills: 1     * glucose 40 % (400 mg/mL) gel  Used for: Type 2 diabetes mellitus with stage 3b chronic kidney disease, without long-term current use of insulin (H)      Dose: 15 g  Take 15 g by mouth every 15 minutes as needed for low blood sugar.  Refills: 0     levothyroxine 175 MCG tablet  Commonly known as: SYNTHROID/LEVOTHROID  Used for: Postoperative hypothyroidism      Dose: 175 mcg  Take 1 tablet (175 mcg) by  mouth daily.  Quantity: 90 tablet  Refills: 1     Nutrisource Fiber PO packet      Dose: 1 packet  Take 1 packet by mouth daily. Benefiber  Refills: 0     omega-3 acid ethyl esters 1 g capsule  Commonly known as: LOVAZA  Used for: Chronic kidney disease, stage 4 (severe) (H)      Dose: 1 g  Take 1 capsule (1 g) by mouth 2 times daily.  Refills: 0     ondansetron 4 MG tablet  Commonly known as: ZOFRAN  Used for: Diverticulitis of colon      Dose: 4 mg  Take 1 tablet (4 mg) by mouth every 6 hours as needed for nausea.  Refills: 0     oxymetazoline 0.05 % nasal spray  Commonly known as: AFRIN  Used for: Epistaxis      Dose: 2 spray  Spray 0.2 mLs (2 sprays) into both nostrils 2 times daily as needed for congestion.  Quantity: 30 mL  Refills: 0     * predniSONE 5 MG tablet  Commonly known as: DELTASONE  Used for: Status post liver transplantation (H), Intra-abdominal infection      Dose: 5 mg  Take 1 tablet (5 mg) by mouth daily. Take with 5mg tablet with 4mg (1mg tablets x4) to equal 9mg daily  Quantity: 30 tablet  Refills: 0     * predniSONE 1 MG tablet  Commonly known as: DELTASONE  Used for: Status post liver transplantation (H), Intra-abdominal infection      Dose: 4 mg  Take 4 tablets (4 mg) by mouth daily. Take 4mg (1mg tablets x4) and Take with 5mg tablet to equal 9mg daily  Quantity: 90 tablet  Refills: 0     PreserVision AREDS 2 Caps  Used for: Liver replaced by transplant (H)      Dose: 1 capsule  Take 1 capsule by mouth 2 times daily  Refills: 0     Repatha SureClick 140 MG/ML prefilled autoinjector  Used for: Hyperlipidemia LDL goal <70, Statin intolerance  Generic drug: evolocumab      Dose: 140 mg  Inject 1 mL (140 mg) subcutaneously every 14 days.  Quantity: 6 mL  Refills: 3     sertraline 50 MG tablet  Commonly known as: ZOLOFT  Used for: Depression, unspecified depression type      Dose: 50 mg  Take 1 tablet (50 mg) by mouth daily.  Quantity: 30 tablet  Refills: 0     tacrolimus 1 mg/mL  suspension  Commonly known as: GENERIC  Used for: Status post liver transplantation (H), Sjogren's syndrome, with unspecified organ involvement, Immunocompromised      Dose: 2 mg  Take 2 mLs (2 mg) by mouth 2 times daily.  Quantity: 120 mL  Refills: 11     Tradjenta 5 MG Tabs tablet  Used for: Postoperative hypothyroidism, Papillary thyroid carcinoma (H), Type 2 diabetes mellitus with stage 3 chronic kidney disease, without long-term current use of insulin (H)  Generic drug: linagliptin      Dose: 5 mg  Take 1 tablet (5 mg) by mouth daily.  Quantity: 90 tablet  Refills: 1     triamcinolone 0.1 % external ointment  Commonly known as: KENALOG      Apply topically 2 times daily.  Refills: 0     ursodiol 250 MG tablet  Commonly known as: ACTIGALL  Used for: Liver replaced by transplant (H)      Dose: 250 mg  Take 1 tablet (250 mg) by mouth 2 times daily.  Quantity: 180 tablet  Refills: 3     vitamin A 3 MG (92967 UNITS) capsule  Used for: Diverticulitis of large intestine with abscess without bleeding      Dose: 20,000 Units  Take 2 capsules (20,000 Units) by mouth daily for 18 days.  Refills: 0           * This list has 4 medication(s) that are the same as other medications prescribed for you. Read the directions carefully, and ask your doctor or other care provider to review them with you.                Allergies   Allergies   Allergen Reactions    Fluconazole Hives and Itching     Full body hives  **Intradermal skin testing negative**  [See intradermal skin testing results from 8/2/2019]    Mycophenolate Diarrhea and Nausea and Vomiting     Patient stated it was chronic and lasted months      Penicillins Anaphylaxis, Hives, Itching and Rash     **Intradermal skin testing negative**  [See intradermal skin testing results from 8/2/2019]      Simvastatin Muscle Pain (Myalgia)     severe  Other reaction(s): Myalgia caused by statin    Methotrexate Other (See Comments)     Other reaction(s): Sore  Sores in mouth,  esophagus, and stomach.       Morphine And Codeine Itching and Other (See Comments)     Psych disturbance  Other reaction(s): Confusion, Mood alteration    Quinolones Anxiety, Dizziness, Headache, Other (See Comments), Palpitations and Unknown     Other reaction(s): Hyperactive behavior, Lightheadedness, Mood alteration    Dizzy, light headed    Dizziness, shaky, and jumpy    Capsules, Empty Gelatin [Gelatin]     Carvedilol Diarrhea     Per pt - severe diarrhea and LE swelling  + tolerating Toprol    Lansoprazole Diarrhea    Strawberry Extract     Azithromycin Itching     [See intradermal skin testing results from 8/2/2019]    Bactrim [Sulfamethoxazole-Trimethoprim] Other (See Comments)     Numb mouth, tingling lips (treated with anti-histamines)    Cephalosporins Itching     [See intradermal skin testing results from 8/2/2019]    Ciprofloxacin Hcl Other (See Comments) and Dizziness     Insomnia, mood lability, Irregular heart beat         Doxycycline Itching and Unknown     [See intradermal skin testing results from 8/2/2019]    Lisinopril Cough    Omeprazole Itching    Tigecycline Other (See Comments)     Perioral numbness in 2025 with long-term use    Tolectin [Nsaids] Rash    Tolmetin Rash and Itching    Tramadol Rash, Hives and Itching

## 2025-06-26 NOTE — PROGRESS NOTES
[facility] GERIATRICS    PRIMARY CARE PROVIDER AND CLINIC:  Daniel Hidalgo MD, [address] [facility] [address]  Chief Complaint   Patient presents with    Hospital F/U      [facility] Medical Record Number:  [id number]  Place of Service where encounter took place:  [facility] (TCU)([facility]) [[id number]]    Luz Thompson  is a 75 year old  (1950), returned to the above facility from  [facility]. Hospital stay 6/22/25 - 6/25/25.     HPI:    Luz Thompson is a 75 year old female with a history of  primary biliary cirrhosis status post liver transplant in 2002, coronary artery disease, hypertension, atrial fibrillation, currently on apixaban, diabetes, history of CVA, CKD, anemia, squamous cell carcinoma s/p Mohs procedure of left cheek, recurrent ESBL UTIs.     Patient had a recent hospitalization (5/28/25 - 6/12/25) for sepsis and peritonitis secondary to sigmoid microperforation with intra-abdominal abscesses. Hospital course was complicated by sepsis, requiring ICU admission and ultimately open sigmoidectomy and end colostomy on 5/30/2025, sepsis was secondary to Klebsiella, Pseudomonas and VRE.  She did require stress dose steroids secondary to adrenal insufficiency in the setting of sepsis.  Of note, her home amlodipine, hydralazine, and metoprolol have all been on hold due to hypotension after critical illness/sepsis.  Patient reports that she has an implantable loop recorder and she recently received a phone call indicating that she had been in atrial fibrillation for a few weeks (see phone call dated 6/17/2025), and tonight approximately 3 hours prior to arrival, she reports that she developed some left-sided chest pain.  She was sitting at the time of onset.  Pain did not radiate.  She described it as a pressure.  She is unable to tell if she is in atrial fibrillation, denies any palpitations.  She denies any  nausea or vomiting.  No fevers or chills.  She has had a cough which is mainly dry and nonproductive.  No shortness of breath, no sore throat or nasal congestion.  She has abdominal abdominal pain around her colostomy site, this has not changed.  She denies any drainage from her surgical incision.  She does report that she does have some issues with skin integrity around the colostomy site.  No change in the stool output in the ostomy.    The primary encounter diagnosis was Diverticulitis of colon. Diagnoses of Colostomy present (H), Chronic shoulder pain, unspecified laterality, Acute on chronic anemia, Type 2 diabetes mellitus with stage 3b chronic kidney disease, without long-term current use of insulin (H), Atrial fibrillation with RVR (H), Status post liver transplantation (H), Hypothyroidism, unspecified type, Stage 3b chronic kidney disease (H), Physical deconditioning, SOB (shortness of breath), Hypoxia, Intra-abdominal infection, Hx of sepsis, Hypotension, unspecified hypotension type, S/P laparoscopic-assisted sigmoidectomy, Generalized muscle weakness, Age-related osteoporosis without current pathological fracture, Bilateral low back pain without sciatica, unspecified chronicity, Chronic anemia, S/P Mohs surgery for basal cell carcinoma, 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, Hypertension goal BP (blood pressure) < 140/80, Nocturnal hypoxemia, Hyperlipidemia LDL goal <70, Chronic diastolic heart failure (H), RASHIDA (obstructive sleep apnea), Depression, unspecified depression type, Leukocytosis, unspecified type, Personal history of fall, Vaginal bleeding, Epistaxis, Slow transit constipation, Nausea, Gastroesophageal reflux disease with esophagitis, unspecified whether hemorrhage, Osteoarthritis, unspecified osteoarthritis type, unspecified site, Low back pain, unspecified back pain laterality, unspecified chronicity, unspecified whether sciatica present, Papillary thyroid carcinoma (H),  Thrombocytopenia, Acute tubular necrosis, Acute kidney injury superimposed on CKD, History of TIA (transient ischemic attack) and stroke, Protein-calorie malnutrition, unspecified severity, Sjogren's syndrome, with unspecified organ involvement, S/P colostomy (H), and Other specified hypotension were also pertinent to this visit.    Patient returned back to TCU on 6/25/25 abruptly without any warning from intake team. She admitted back with orders for PRN metoprolol every 2 hours PRN. Discussed with management who indicated that TCU is not equipped to provide the level of monitoring [place] needs, and since we can't accommodate her care here, we'll have to  arrange for her to be sent back to the hospital.    EMS was contacted-  LIZ Thompson refused to go with EMS back to hospital. They consulted their supervisor and the medical director, and they could not take her as it would be considered kidnapping. Her vitals were not out of range at the time they were here, so they had no justification to transport. She remains in the facility. DON contacted medical director who gave orders for: D/c the order the hospital gave regarding the Metorpolol prn every 2 hours. He ordered to check heart rate q shift and give Metorpolol tar 25mg tid prn HR > 110.    Today-Met with patient who was found lying in bed. She denies any chest pain, palpitations, shortness of breath, [place], lightheadedness, dizziness, or cough. No events last night reported. Denies any abdominal discomfort. Denies N&V. Denies B&B concerns. Denies dysuria or frequency. Denies loose or constipation. Stools are brown and soft noted in colostomy today. Staples still present to abdominal incision--these were removed today. Appetite good. Sleeping well. No pain complaints.     BP Readings from Last 3 Encounters:   06/26/25 121/63   06/25/25 122/82   06/20/25 132/81     Wt Readings from Last 5 Encounters:   06/26/25 83.4 kg (183 lb 12.8 oz)   06/22/25 82.1 kg (181 lb)    06/20/25 82.1 kg (181 lb)   06/18/25 84.2 kg (185 lb 9.6 oz)   06/16/25 82.5 kg (181 lb 12.8 oz)     CODE STATUS/ADVANCE DIRECTIVES DISCUSSION:  Full Code  CPR/Full code   ALLERGIES:   Allergies   Allergen Reactions    Fluconazole Hives and Itching     Full body hives  **Intradermal skin testing negative**  [See intradermal skin testing results from 8/2/2019]    Mycophenolate Diarrhea and Nausea and Vomiting     Patient stated it was chronic and lasted months      Penicillins Anaphylaxis, Hives, Itching and Rash     **Intradermal skin testing negative**  [See intradermal skin testing results from 8/2/2019]      Simvastatin Muscle Pain (Myalgia)     severe  Other reaction(s): Myalgia caused by statin    Methotrexate Other (See Comments)     Other reaction(s): Sore  Sores in mouth, esophagus, and stomach.       Morphine And Codeine Itching and Other (See Comments)     Psych disturbance  Other reaction(s): Confusion, Mood alteration    Quinolones Anxiety, Dizziness, Headache, Other (See Comments), Palpitations and Unknown     Other reaction(s): Hyperactive behavior, Lightheadedness, Mood alteration    Dizzy, light headed    Dizziness, shaky, and jumpy    Capsules, Empty Gelatin [Gelatin]     Carvedilol Diarrhea     Per pt - severe diarrhea and LE swelling  + tolerating Toprol    Lansoprazole Diarrhea    Strawberry Extract     Azithromycin Itching     [See intradermal skin testing results from 8/2/2019]    Bactrim [Sulfamethoxazole-Trimethoprim] Other (See Comments)     Numb mouth, tingling lips (treated with anti-histamines)    Cephalosporins Itching     [See intradermal skin testing results from 8/2/2019]    Ciprofloxacin Hcl Other (See Comments) and Dizziness     Insomnia, mood lability, Irregular heart beat         Doxycycline Itching and Unknown     [See intradermal skin testing results from 8/2/2019]    Lisinopril Cough    Omeprazole Itching    Tigecycline Other (See Comments)     Perioral numbness in 2025 with  "long-term use    Tolectin [Nsaids] Rash    Tolmetin Rash and Itching    Tramadol Rash, Hives and Itching      PAST MEDICAL HISTORY:   Past Medical History:   Diagnosis Date    Anemia of chronic disease 10/17/2011    Anxiety     CKD (chronic kidney disease) stage 3, GFR 30-59 ml/min (H) 04/04/2012    Coccidioidomycosis, 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 Gentry Ramirez, 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      PAST SURGICAL HISTORY:   has a past surgical history that includes Thyroidectomy (03/2010); appendectomy (1961); Cholecystectomy (1991); KNEE SCOPE,SINGLE MENISECTOMY (Right, 08/10/2012); Transplant liver recipient living unrelated (05/2002); knee surgery (1966); picc insertion (09/18/2013); Endoscopic retrograde cholangiopancreatogram (09/19/2013); cataract iol, rt/lt; picc insertion (02/21/2014); Colonoscopy (03/10/2014); Endobronchial " ultrasound flexible (N/A, 09/29/2017); ear drum repair; Cystoscopy; Insert stent ureter (Bilateral, 5/29/2025); and Colectomy with colostomy, combined (N/A, 5/29/2025).  FAMILY HISTORY: family history includes Allergies in her son; Alzheimer Disease in her paternal grandfather; Breast Cancer in an other family member; Cancer - colorectal in her maternal grandmother; Cerebrovascular Disease in her paternal grandmother; Endometrial Cancer in her mother; Glaucoma in her maternal grandfather; Heart Disease in her maternal grandfather and paternal grandfather; Hyperlipidemia in her mother; Hypertension in her mother and paternal grandmother; Macular Degeneration in her father; Neurologic Disorder in her daughter; Prostate Cancer in her father.  SOCIAL HISTORY:   reports that she quit smoking about 40 years ago. Her smoking use included cigarettes. She started smoking about 58 years ago. She has a 18 pack-year smoking history. She has never used smokeless tobacco. She reports current alcohol use. She reports that she does not use drugs.  Patient's living condition: lives alone    Post Discharge Medication Reconciliation Status:   MED REC REQUIRED  Post Medication Reconciliation Status:  Discharge medications reconciled and changed, see notes/orders       Current Outpatient Medications   Medication Sig Dispense Refill    acetaminophen (TYLENOL) 500 MG tablet Take 1,000 mg by mouth 2 times daily. During 4/16/25 med recc pt states take BID everyday      albuterol (PROAIR HFA/PROVENTIL HFA/VENTOLIN HFA) 108 (90 Base) MCG/ACT inhaler Inhale 2 puffs into the lungs every 4 hours as needed for shortness of breath, wheezing or cough. 18 g 0    apixaban ANTICOAGULANT (ELIQUIS) 2.5 MG tablet Take 1 tablet (2.5 mg) by mouth 2 times daily. 60 tablet 0    calcitRIOL (ROCALTROL) 0.25 MCG capsule Take 1 capsule (0.25 mcg) by mouth daily. 90 capsule 1    calcium carbonate (TUMS) 500 MG chewable tablet Take 2 tablets (1,000 mg) by mouth  every 4 hours as needed for heartburn.      Continuous Glucose Sensor (FREESTYLE NINO 2 SENSOR) MISC Change every 14 days. 2 each 5    D-MANNOSE PO Take 1 Scoop by mouth 2 times daily.      diphenhydrAMINE (BENADRYL) 25 MG tablet Take 25 mg by mouth every 6 hours as needed for itching or allergies.      EPINEPHrine (ANY BX GENERIC EQUIV) 0.3 MG/0.3ML injection 2-pack Inject 0.3 mLs (0.3 mg) into the muscle as needed for anaphylaxis. May repeat one time in 5-15 minutes if response to initial dose is inadequate. 2 each 1    estradiol (ESTRACE) 0.1 MG/GM vaginal cream Place vaginally three times a week.      evolocumab (REPATHA SURECLICK) 140 MG/ML prefilled autoinjector Inject 1 mL (140 mg) subcutaneously every 14 days. 6 mL 3    ezetimibe (ZETIA) 10 MG tablet Take 1 tablet (10 mg) by mouth daily. 90 tablet 3    Ferrous Sulfate 324 MG TBEC Take 1 tablet by mouth daily.      folic acid (FOLVITE) 1 MG tablet TAKE 1 TABLET BY MOUTH DAILY 90 tablet 3    gabapentin (NEURONTIN) 250 MG/5ML solution Take 6 mLs (300 mg) by mouth at bedtime 180 mL 0    Glucagon (GVOKE HYPOPEN) 1 MG/0.2ML pen Inject the contents of 1 device under the skin into lower abdomen, outer thigh, or outer upper arm as needed for hypoglycemia. If no response after 15 minutes, additional 1 mg dose from a new device may be injected while waiting for emergency assistance.      glucose (BD GLUCOSE) 5 g chewable tablet Take 2 tablets (10 g) by mouth as needed (low blood sugar) 40 tablet 1    glucose 40 % (400 mg/mL) gel Take 15 g by mouth every 15 minutes as needed for low blood sugar.      levothyroxine (SYNTHROID/LEVOTHROID) 175 MCG tablet Take 1 tablet (175 mcg) by mouth daily. 90 tablet 1    linagliptin (TRADJENTA) 5 MG TABS tablet Take 1 tablet (5 mg) by mouth daily. 90 tablet 1    metoprolol succinate ER (TOPROL XL) 50 MG 24 hr tablet Take 1 tablet (50 mg) by mouth daily.      metoprolol tartrate (LOPRESSOR) 25 MG tablet Take 1 tablet (25 mg) by mouth  "every 2 hours as needed (For HR over 120 for >5 minutes and symptoms of chest pressure, pain, or dizziness take x1. May take again in 2 hours if needed.). If HR remains high after 2 doses of this medication, please contact a medical provider      Multiple Vitamins-Minerals (PRESERVISION AREDS 2) CAPS Take 1 capsule by mouth 2 times daily      Nutrisource Fiber PO packet Take 1 packet by mouth daily. Benefiber      omega-3 acid ethyl esters (LOVAZA) 1 g capsule Take 1 capsule (1 g) by mouth 2 times daily.      ondansetron (ZOFRAN) 4 MG tablet Take 1 tablet (4 mg) by mouth every 6 hours as needed for nausea.      oxymetazoline (AFRIN) 0.05 % nasal spray Spray 0.2 mLs (2 sprays) into both nostrils 2 times daily as needed for congestion. 30 mL 0    predniSONE (DELTASONE) 1 MG tablet Take 4 tablets (4 mg) by mouth daily. Take 4mg (1mg tablets x4) and Take with 5mg tablet to equal 9mg daily 90 tablet 0    predniSONE (DELTASONE) 5 MG tablet Take 1 tablet (5 mg) by mouth daily. Take with 5mg tablet with 4mg (1mg tablets x4) to equal 9mg daily 30 tablet 0    sertraline (ZOLOFT) 50 MG tablet Take 1 tablet (50 mg) by mouth daily. 30 tablet 0    tacrolimus (GENERIC) 1 mg/mL suspension Take 2 mLs (2 mg) by mouth 2 times daily. 120 mL 11    triamcinolone (KENALOG) 0.1 % external ointment Apply topically 2 times daily.      ursodiol (ACTIGALL) 250 MG tablet Take 1 tablet (250 mg) by mouth 2 times daily. 180 tablet 3    vitamin A 3 MG (42934 UNITS) capsule Take 2 capsules (20,000 Units) by mouth daily for 18 days.       No current facility-administered medications for this visit.       ROS:  10 point ROS of systems including Constitutional, Eyes, Respiratory, Cardiovascular, Gastroenterology, Genitourinary, Integumentary, Musculoskeletal, Psychiatric were all negative except for pertinent positives noted in my HPI.    Vitals:  /63   Pulse 96   Temp 98.2  F (36.8  C)   Resp 18   Ht 1.702 m (5' 7\")   Wt 83.4 kg (183 lb " 12.8 oz)   LMP 06/01/1988 (Approximate)   SpO2 93%   BMI 28.79 kg/m    Exam:  GENERAL APPEARANCE:  Alert, in no distress, cooperative  ENT:  Mouth and posterior oropharynx normal, moist mucous membranes, [place]  EYES:  EOM, conjunctivae, lids, pupils and irises normal  NECK:  No adenopathy,masses or thyromegaly  RESP:  respiratory effort and palpation of chest normal, lungs clear to auscultation , no respiratory distress  CV:  regular rate and rhythm, no murmur, rub, or gallop, no edema, +2 pedal pulses  ABDOMEN:  normal bowel sounds, soft, nontender, no hepatosplenomegaly or other masses, no guarding or rebound  M/S:   Angel lift for all transfers. Wheelchair bound. Staff to assist for all ADLs, transfers and cares  SKIN:  Inspection of skin and subcutaneous tissue baseline, wound healing well, no signs of infection see photos. Staples removed today  NEURO:   Cranial nerves 2-12 are normal tested and grossly at patient's baseline, no purposeful movement in upper and lower extremities  PSYCH:  oriented X 3, memory impaired , affect and mood normal            Lab/Diagnostic data:  Most Recent 3 CBC's:  Recent Labs   Lab Test 06/23/25  0543 06/22/25  2144 06/20/25  1047 06/16/25  1038   WBC 10.2 10.5 8.4 8.8   HGB 8.1* 8.5* 7.5* 7.5*   MCV 97 96 96 95   * 632*  --  210     Most Recent 3 BMP's:  Recent Labs   Lab Test 06/25/25  1152 06/25/25  0831 06/25/25  0811 06/24/25  2221 06/24/25  0806 06/24/25  0701 06/23/25  0552 06/23/25  0543 06/22/25  2144 06/20/25  1047   NA  --   --   --   --   --   --   --  141 137  137 138   POTASSIUM  --   --  5.0  --   --  4.9  --  4.6 5.5*  5.5* 4.6   CHLORIDE  --   --   --   --   --   --   --  107 104  104 103   CO2  --   --   --   --   --   --   --  22 19*  19* 23   BUN  --   --   --   --   --   --   --  38.0* 41.0*  41.0* 40.9*   CR  --   --   --   --   --   --   --  1.56* 1.68*  1.68* 1.86*   ANIONGAP  --   --   --   --   --   --   --  12 14 14 12   KEILY  --   --    --   --   --   --   --  8.7* 8.9  8.9 8.5*   * 83  --  153*   < >  --    < > 89 180*  180* 92    < > = values in this interval not displayed.     Most Recent 2 LFT's:  Recent Labs   Lab Test 06/23/25  0543 06/22/25  2144   AST 17 23   ALT 26 27   ALKPHOS 124 135   BILITOTAL 0.2 0.2     Most Recent 3 Troponin's:  Recent Labs   Lab Test 05/18/18  0731   TROPI <0.015     Most Recent 3 BNP's:  Recent Labs   Lab Test 04/14/25  1701 03/08/25  1606 06/13/23  1820   NTBNPI 1,446 963* 1,275*     Most Recent Cholesterol Panel:  Recent Labs   Lab Test 09/04/24  1008   CHOL 291*   *   HDL 72   TRIG 329*     Most Recent TSH and T4:  Recent Labs   Lab Test 06/22/25  2144 01/20/25  1635 11/14/24  1506   TSH 4.16   < > 3.31   T4  --   --  1.92*    < > = values in this interval not displayed.     Most Recent Hemoglobin A1c:  Recent Labs   Lab Test 05/29/25  1515   A1C 6.3*     Most Recent Urinalysis:  Recent Labs   Lab Test 06/07/25  1317 04/07/25  0945 04/07/25  0935   COLOR Yellow   < > Light Yellow   APPEARANCE Clear   < > Slightly Cloudy*   URINEGLC 50*   < > Negative   URINEBILI Negative   < > Negative   URINEKETONE Negative   < > Negative   SG 1.033   < > 1.007   UBLD Negative   < > Trace*   URINEPH 5.5   < > 5.5   PROTEIN 50*   < > 10*   UROBILINOGEN  --   --  0.2   NITRITE Negative   < > Negative   LEUKEST Negative   < > Large*   RBCU 2   < > 2-5*   WBCU 4   < > >100*    < > = values in this interval not displayed.     Most Recent ESR & CRP:  Recent Labs   Lab Test 06/22/25  2254 04/28/25  1213 04/15/25  0613 06/13/23  1820 08/26/19  2331   SED  --   --  61*  --  78*   CRP  --   --   --   --  180.0*   CRPI 14.10*   < >  --    < >  --     < > = values in this interval not displayed.     Most Recent Anemia Panel:  Recent Labs   Lab Test 06/23/25  0543 04/14/25  1410 04/07/25  0945 09/04/24  1008 06/15/23  1028   WBC 10.2   < > 6.5   < > 7.6   HGB 8.1*   < > 9.1*   < > 12.1   HCT 27.2*   < > 30.0*   < > 38.4    MCV 97   < > 88   < > 92   *   < > 351   < > 345   IRON  --   --  36*   < >  --    IRONSAT  --   --  13*   < >  --    FEB  --   --  277   < >  --    LAURA  --   --  44   < >  --    B12  --   --   --   --  456    < > = values in this interval not displayed.     Magnesium   Date Value Ref Range Status   06/25/2025 2.1 1.7 - 2.3 mg/dL Final   08/05/2019 2.2 1.6 - 2.3 mg/dL Final     Vitamin D Deficiency Screening Results:  Lab Results   Component Value Date    VITDT 30 04/04/2025    VITDT 15 (L) 12/22/2012    VITDT 27 (L) 09/27/2012       ASSESSMENT/PLAN:  Diverticulitis complicated by abscess status post open sigmoidectomy with end colostomy 5/30/25  History of Sepsis due to Klebsiella pneumoniae and Pseudomonas aeruginosa bacteremia 5/28, likely of intra-abdominal origin   Hypotension due to sepsis  Comment: Long multiple hospitalization course. She required a slow steroid taper because of blood pressure dysregulations. Transitioned from meropenem to ceftolozane-tazobactam due to resistance (psuedomonas coverage) and switched vancomycin to linezolid in the setting of acute kidney injury for coverage of previous vancomycin-resistent enterococci (E Facecium). From infectious disease's perspective, there were no good oral antibiotic options and so she completed a 7-day IV antibiotic course at the hospital. There was new cloudy PATY drain output on 6/6 and ongoing leukocytosis but no new localizing symptoms so we continued to monitor and found. She had been seen in colorectal surgery clinic the day prior (5/27) where it was decided that she should undergo open sigmoidectomy with DLI.   Plan:  -Follow up with ID as directed. Scheduled for 7/2/25  -Follow up with Colorectal Surgery as directed. Scheduled for 7/15/25  -High dose Vitamin A x 30 days post-op (through 6/30)  -Change nutrisource supply to personal benefiber packet daily per request  -CMP, and CBC due 6/27/25  -Continue colostomy care as  "directed:  *Peristomal wound: With pouch changes - clean open wound to medial side of stoma with wound spray. Cut piece of Hydrofera Blue Classic to size of open area. Wet Hydrofera Blue (#[id number]) with saline and squeeze out excess, place in wound base, apply small bead of ostomy paste to the medial edge of hydrafera blue/Pt's skin to help hold in/seal hydrafera blue, then cover with additional ostomy ring to secure, apply wafer and caulk open creases around barrier ring, then attach pouch   *LLQ Colostomy pouching plan:   Pouching system: ostomy supplies pouches: [place] 57 FECAL ([id number]) ostomy supplies barrier: [place] 57mm SOFT CONVEX ([id number])  Accessories used: [facility] ostomy accessories: 2\" Cera Barrier Ring ([id number]) and Cavilon no sting barrier film ([id number])   Frequency of pouch changes: Twice weekly and PRN leakage  Bedside RN interventions: Change pouch PRN if leaking using the supplies above, Empty pouch when 1/3 to 1/2 full, ensure to clean pouch outlet after emptying to prevent odor, Notify [facility] for ongoing pouch leakage, Document stoma appearance and output volume, color, and consistency every shift, Encourage patient to empty pouch with assist, and Assist patient to measure and record output      Liver transplant in 2002 2/2 primary biliary cirrhosis  EBV +  Reactive Leukocytosis   Sjogren's syndrome  Comment: Chronic. Follows Dr. Ramirez. Transplant Hepatology was consulted and advised against sirolimus. PTA prednisone 9mg was resumed after weaning off IV hydrocortisone   Plan:  -Continue Tacrolimus Oral suspension 2mg/2mL BID. Must be suspension given unable to tolerate capsule due to allergy to filler--to be filled by [facility]  -Continue prednisone 9mg daily for now. (5mg with 4 tablets of 1mg=9mg total)  -Continue PTA ursodiol 250 mg BID  -Follow up with transplant team as directed. Scheduled for 8/22/25  -Follow up with nephrology as directed. Scheduled for 7/23/25  -CMP, and CBC " due 6/27/25     Depression   Protein calorie deficit malnutrition    Comment: Chronic. Appetite fair. She is worried about some weight loss, however per PCC her weights have been stable around 180# since April 2025. [facility] 26/30  Plan:  -Monitor mood and behaviors  -Dietician to remain involved to assist with supplemental needs  -Continue chocolate glucerna BID with meals for now--dietician to adjust accordingly  -Monitor for worsening s/symptoms of concerns  -Monitor for changes in mobility, eating and sleeping patterns  -Continue zoloft 50mg daily.   -ACP while on TCU  -CMP, and CBC due 6/27/25     Mild Obstructive Sleep Apnea   Nocturnal Hypoxemia   Pleural effusions  Shortness of breath  Acute respiratory failure with hypoxia  Comment: Chronic. Sleep study in 2018 showing mild RASHIDA with recommendation to start CPAP. It does not appear that there was further follow up and not using CPAP.   Plan:  -Monitor sleeping patterns  -Continue oxygen 1-2L via NC to keep sats >88% PRN  -Continue albuterol inhaler every 4 hours PRN. May HUNTER  -Monitor respiratory status  -PCP to consider sleep medicine referral  -CMP, and CBC due 6/27/25     CKD3b w/ moderate proteinuria, at baseline  HTN  CVD/HLD  Atrial fibrillation with RVR  Comment: Chronic. Baseline Cr. 1.5. History of dialysis at time of liver transplant 23 years ago. Recurrent AKIs and immunosuppressive medication toxicity. Not on SGLT2i 2/2 recurrent UTI. Chart review w/ unclear heart failure history, unconfirmed with EF 60-65% on 3/10/25. BNP 1446 elevated from 1 month ago. Per cardiology on 6/17/25 Loop recording: Patient has been in persistent AF since 6/2/25. She has a history of pAF but this is the first persistent episode logged. She was hospitalized from 5/28-6/12 with septic shock secondary to diverticulitis complicated by abscess which required sigmoidectomy and colostomy. She states that during her hospitalization her telemetry frequently alerted for HRs in  the 40's. Her loop recorder is set to alert for HRs < 40bpm... Per ED/recent hospitalization: She is tachycardic with a heart rate of 120. EKG was done upon arrival and this demonstrates atrial fibrillation with RVR with a rate of 138.  Here in the ED she has had rates ranging from 110s to 140s.  Blood pressure is acceptable at 133/100. Suspect her atrial fibrillation is due to the fact that she has been off of her rate control and antihypertensive medication/beta-blocker since her recent critical illness.  Recent echocardiogram dated 6/4/2025 shows normal EF of 60 to 65%. We did give the patient a dose of diltiazem here in the emergency department followed by a diltiazem drip.  We did establish IV access and we did draw blood for laboratory analysis.  CBC shows a normal white count of 10.5, hemoglobin is 8.5, up from 7.5 from 2 days ago, platelets are 632, this thrombocytosis appears to be new, CMP shows CKD with a creatinine of 1.68, consistent with previous, mild hyperkalemia with a potassium of 5.5, otherwise electrolytes and LFTs appear to be within normal limits, magnesium within normal limits at 2.0, TSH within normal limits at 4.16, troponin is elevated at 54, this does appear to be somewhat above her baseline.  Repeat troponin is flat at 54.  Chest x-ray per my read does not show any evidence of infiltrates.  Radiology overread makes note of hazy opacities in the right base which may reflect focal pleural calcifications without signs of pneumonia or heart failure. For her very mild hyperkalemia with potassium of 5.5, we have given a small fluid bolus. Patient was given a dose of diltiazem here in the emergency department, and a diltiazem drip was also ordered.  This seems to have helped with heart rate control, heart rate is currently 105.   Plan:  -Continue metoprolol succinate 25mg daily. HOLD if SBP<100 and/or HR<55  -Continue metoprolol tartrate TID PRN if HR>110. If used >2 time, advise to send to  ED  -Monitor BP and HR  -Continue PTA evolocumab q2week.   -Continue PTA eztimibe 10mg every day  -Continue apixaban 2.5mg BID  -Follow up with cardiology as directed. Advised to schedule ASAP  -CMP, and CBC due 6/27/25     Concern for transient ischemic attack  Comment: Acute. In the morning of 6/4, she stated that she had speech problems in the last couple of days since the surgery. She described that she has trouble getting her words out and that her speech did not feel as fluent as it was previously.  She denied any new numbness, tingling, headache or changes in vision. CT head was negative for intracranial bleed or other acute process and TTE with bubble study was also negative. It is possible that she may have had a TIA following surgery as anticoagulation had been held. MRI brain was negative for acute ischemic changes and her speech has returned to baseline.   Plan:  -Continue PTA Xarelto   -No need for neuro follow-up   -CMP, and CBC due 6/27/25     T2DM  (A1c 6.8% 4/14/25)  Comment: Chronic. Last A1c 6.8% on 4/14/25.   Plan:  -Continue PTA linagliptin 5mg every day  -Continue PTA gabapentin 300mg at bedtime  -Blood Glucose monitoring back to previous schedule of BID. HOLD off on using freestyle phil 2 sensor while on TCU  -CMP, and CBC due 6/27/25  -Obtain hgb A1c as directed. Due July 2025     Acute kidney injury on CKD3, resolved   Bilateral stent placement per urology 5/29  Acute tubular necrosis  Comment: Her baseline creatinine is 1.3-1.6. She had an DERREK in the setting of hypotension, shock, and nephrotoxic meds which resolved after receiving IV fluids and improved PO after surgery.   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 Wound does not look infected. She now report having skin lesion to left anterior  forearm. 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 Will defer to dermatology  Plan:  -Monitor for worsening s/symptoms of concerns  -Eucerin to face to moisturize PRN. May keep at bedside and self apply  -Follow up with dermatology --advised to contact when able     Acute on chronic anemia  coagulopathy due to cirrhosis and surgical blood loss    thrombocytopenia   Comment: Chronic. Per recent hospitalization-Did not find any signs of overt bleeding and her ostomy output and vitals were stable. Continued her ferous sulfate and folic acid inpatient. She received 1 unit of packed red blood cells on 6/7. Stable at the time of discharge. Now average around mid 7  Plan:  -Monitor bleeding risks  -Continue PTA ferrous sulfate daily  -Recommend transfusion if hgb <7, low threshold to send to ED for transfusions needs if indicated  -Continue folic acid daily  -CMP, and CBC due 6/27/25     Hypothyroidism  Papillary thyroid carcinoma  Chronic. Recent TSH~1.72 on 4/4/25. S/p thyroidectomy 2010  Plan;  -Continue PTA levothyroxine 175mcg qD   -Monitor for worsening s/symptoms of concerns  -Repeat thyroid panel in July 2025     Chronic Shoulder Pain  Osteoarthritis   Lower back pain  Comment: Chronic. stable  Plan:  -Monitor pain complaints  -Continue diclofenac gel application to painful areas QID  -Continue Tylenol 1000mg BID   -Continue gabapentin 300mg at HS--only able to tolerate liquid given gelatin capsule allergy     Constipation   Nausea  GERD  Comment: Chronic. S/T polypharmacy.   Plan:  -Monitor BM patterns  -Continue TUMS 1000mg every 4 hours PRN  -Continue beneFiber qd    -Zofran PRN     Epistaxis  Comment: Acute on chronic. She reports occasional nose bleed at times. No episodes while on TCU.   Plan:  -Monitor bleeding risks  -Monitor for worsening s/symptoms of concerns  -Afrin BID PRN on TCU if warranted     Vaginal bleeding  Comment: Acute on chronic. Referred to OBGYN. Last  menstrual period she reports has been 40 years ago or so. Known family history of endometrial cancer that metastasized to bowel per her reports. She reports her mother had extensive genetic testing for review in [facility]. Reports mother name: Anne Yap  3/9/23 for reference if indicated. Per OBGYN on 25:  If the endometrial stripe is greater than 4 mm or if it is less than 4 mm and she continues to have bleeding, she will need tissue evaluation with either office endometrial biopsy or hysteroscopy with D&C in the operating room. She does not believe she can tolerate an endometrial biopsy in the clinic, so we discussed doing this as an outpatient procedure.  Plan:  -Monitor for worsening s/symptoms of concerns  -Follow up with OBGYN as directed  -CMP, and CBC due 25     Physical deconditioning  Generalized muscle weakness  History of falls  Comment: Acute on chronic. Known poor history PTA. [facility] given concerns. Per therapy-Patient requires MOdA for bed and UB needs. Maximum assistance for LB needs and toileting. Angel lift for all transfers given weakness. Increased anxiety and fear with transfers since TCU return.   Plan:  -Continue Physical therapy and Occupational therapy  -Recommend cognitive testing on site  -Highly recommend MIKEY compliance post TCU     53 minutes spent on the date of the encounter doing chart review, history and exam, PMH, documentation and further activities as noted above. Collaboration with nursing staff on site, clinical managers, and therapies were completed on site today.     Electronically signed by:  Dr. Manda Flor DNP, APRN, FNP-C, WCS-C, EDS-C     Provider reviewed records from facility, and interpreted most recent imaging/lab work, and vital signs.   Acute and chronic conditions managed by writer. Have been reviewed during today's exam

## 2025-06-26 NOTE — LETTER
[facility]   To:             Please give to facility    From:   Alpa Cho RN  Care Coordinator   [facility]   P: [phone]  [email or web address]   Patient Name:  Luz Thompson YOB: 1950   Admit date: 6/25/25      *Information Needed:  Please contact me when the patient will discharge (or if they will move to long term care)- include the discharge date, disposition, and main diagnosis   If the patient is discharged with home care services, please provide the name of the agency    Also- Please inform me if a care conference is being held.   Phone, Fax or Email with information                      Thank you            Electronically signed

## 2025-06-26 NOTE — TELEPHONE ENCOUNTER
Pt at  [facility] can you touch base with them about getting a 12 hour tacrolimus level this week.  She can have drawn weekly x 2.

## 2025-06-26 NOTE — LETTER
6/26/2025      Luz Thompson  [address]  [facility] [address]        [facility] GERIATRICS    PRIMARY CARE PROVIDER AND CLINIC:  Daniel Hidalgo MD, [address] [facility] [address]  Chief Complaint   Patient presents with     Hospital F/U      [facility] Medical Record Number:  [id number]  Place of Service where encounter took place:  [facility] (TCU)(SNF) [[id number]]    Luz Thompson  is a 75 year old  (1950), returned to the above facility from  [facility]. Hospital stay 6/22/25 - 6/25/25.     HPI:    Lzu Thompson is a 75 year old female with a history of  primary biliary cirrhosis status post liver transplant in 2002, coronary artery disease, hypertension, atrial fibrillation, currently on apixaban, diabetes, history of CVA, CKD, anemia, squamous cell carcinoma s/p Mohs procedure of left cheek, recurrent ESBL UTIs.     Patient had a recent hospitalization (5/28/25 - 6/12/25) for sepsis and peritonitis secondary to sigmoid microperforation with intra-abdominal abscesses. Hospital course was complicated by sepsis, requiring ICU admission and ultimately open sigmoidectomy and end colostomy on 5/30/2025, sepsis was secondary to Klebsiella, Pseudomonas and VRE.  She did require stress dose steroids secondary to adrenal insufficiency in the setting of sepsis.  Of note, her home amlodipine, hydralazine, and metoprolol have all been on hold due to hypotension after critical illness/sepsis.  Patient reports that she has an implantable loop recorder and she recently received a phone call indicating that she had been in atrial fibrillation for a few weeks (see phone call dated 6/17/2025), and tonight approximately 3 hours prior to arrival, she reports that she developed some left-sided chest pain.  She was sitting at the time of onset.  Pain did not radiate.  She described it as a pressure.  She is  unable to tell if she is in atrial fibrillation, denies any palpitations.  She denies any nausea or vomiting.  No fevers or chills.  She has had a cough which is mainly dry and nonproductive.  No shortness of breath, no sore throat or nasal congestion.  She has abdominal abdominal pain around her colostomy site, this has not changed.  She denies any drainage from her surgical incision.  She does report that she does have some issues with skin integrity around the colostomy site.  No change in the stool output in the ostomy.    The primary encounter diagnosis was Diverticulitis of colon. Diagnoses of Colostomy present (H), Chronic shoulder pain, unspecified laterality, Acute on chronic anemia, Type 2 diabetes mellitus with stage 3b chronic kidney disease, without long-term current use of insulin (H), Atrial fibrillation with RVR (H), Status post liver transplantation (H), Hypothyroidism, unspecified type, Stage 3b chronic kidney disease (H), Physical deconditioning, SOB (shortness of breath), Hypoxia, Intra-abdominal infection, Hx of sepsis, Hypotension, unspecified hypotension type, S/P laparoscopic-assisted sigmoidectomy, Generalized muscle weakness, Age-related osteoporosis without current pathological fracture, Bilateral low back pain without sciatica, unspecified chronicity, Chronic anemia, S/P Mohs surgery for basal cell carcinoma, 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, Hypertension goal BP (blood pressure) < 140/80, Nocturnal hypoxemia, Hyperlipidemia LDL goal <70, Chronic diastolic heart failure (H), RASHIDA (obstructive sleep apnea), Depression, unspecified depression type, Leukocytosis, unspecified type, Personal history of fall, Vaginal bleeding, Epistaxis, Slow transit constipation, Nausea, Gastroesophageal reflux disease with esophagitis, unspecified whether hemorrhage, Osteoarthritis, unspecified osteoarthritis type, unspecified site, Low back pain, unspecified back pain laterality, unspecified  chronicity, unspecified whether sciatica present, Papillary thyroid carcinoma (H), Thrombocytopenia, Acute tubular necrosis, Acute kidney injury superimposed on CKD, History of TIA (transient ischemic attack) and stroke, Protein-calorie malnutrition, unspecified severity, Sjogren's syndrome, with unspecified organ involvement, S/P colostomy (H), and Other specified hypotension were also pertinent to this visit.    Patient returned back to TCU on 6/25/25 abruptly without any warning from intake team. She admitted back with orders for PRN metoprolol every 2 hours PRN. Discussed with management who indicated that TCU is not equipped to provide the level of monitoring [place] needs, and since we can't accommodate her care here, we'll have to  arrange for her to be sent back to the hospital.    EMS was contacted-  LIZ Thompson refused to go with EMS back to hospital. They consulted their supervisor and the medical director, and they could not take her as it would be considered kidnapping. Her vitals were not out of range at the time they were here, so they had no justification to transport. She remains in the facility. DON contacted medical director who gave orders for: D/c the order the hospital gave regarding the Metorpolol prn every 2 hours. He ordered to check heart rate q shift and give Metorpolol tar 25mg tid prn HR > 110.    Today-Met with patient who was found lying in bed. She denies any chest pain, palpitations, shortness of breath, [place], lightheadedness, dizziness, or cough. No events last night reported. Denies any abdominal discomfort. Denies N&V. Denies B&B concerns. Denies dysuria or frequency. Denies loose or constipation. Stools are brown and soft noted in colostomy today. Staples still present to abdominal incision--these were removed today. Appetite good. Sleeping well. No pain complaints.     BP Readings from Last 3 Encounters:   06/26/25 121/63   06/25/25 122/82   06/20/25 132/81     Wt Readings from  Last 5 Encounters:   06/26/25 83.4 kg (183 lb 12.8 oz)   06/22/25 82.1 kg (181 lb)   06/20/25 82.1 kg (181 lb)   06/18/25 84.2 kg (185 lb 9.6 oz)   06/16/25 82.5 kg (181 lb 12.8 oz)     CODE STATUS/ADVANCE DIRECTIVES DISCUSSION:  Full Code  CPR/Full code   ALLERGIES:   Allergies   Allergen Reactions     Fluconazole Hives and Itching     Full body hives  **Intradermal skin testing negative**  [See intradermal skin testing results from 8/2/2019]     Mycophenolate Diarrhea and Nausea and Vomiting     Patient stated it was chronic and lasted months       Penicillins Anaphylaxis, Hives, Itching and Rash     **Intradermal skin testing negative**  [See intradermal skin testing results from 8/2/2019]       Simvastatin Muscle Pain (Myalgia)     severe  Other reaction(s): Myalgia caused by statin     Methotrexate Other (See Comments)     Other reaction(s): Sore  Sores in mouth, esophagus, and stomach.        Morphine And Codeine Itching and Other (See Comments)     Psych disturbance  Other reaction(s): Confusion, Mood alteration     Quinolones Anxiety, Dizziness, Headache, Other (See Comments), Palpitations and Unknown     Other reaction(s): Hyperactive behavior, Lightheadedness, Mood alteration    Dizzy, light headed    Dizziness, shaky, and jumpy     Capsules, Empty Gelatin [Gelatin]      Carvedilol Diarrhea     Per pt - severe diarrhea and LE swelling  + tolerating Toprol     Lansoprazole Diarrhea     Strawberry Extract      Azithromycin Itching     [See intradermal skin testing results from 8/2/2019]     Bactrim [Sulfamethoxazole-Trimethoprim] Other (See Comments)     Numb mouth, tingling lips (treated with anti-histamines)     Cephalosporins Itching     [See intradermal skin testing results from 8/2/2019]     Ciprofloxacin Hcl Other (See Comments) and Dizziness     Insomnia, mood lability, Irregular heart beat          Doxycycline Itching and Unknown     [See intradermal skin testing results from 8/2/2019]     Lisinopril  "Cough     Omeprazole Itching     Tigecycline Other (See Comments)     Perioral numbness in 2025 with long-term use     Tolectin [Nsaids] Rash     Tolmetin Rash and Itching     Tramadol Rash, Hives and Itching      PAST MEDICAL HISTORY:   Past Medical History:   Diagnosis Date     Anemia of chronic disease 10/17/2011     Anxiety      CKD (chronic kidney disease) stage 3, GFR 30-59 ml/min (H) 04/04/2012     Coccidioidomycosis, 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      Deep venous thrombosis      Diverticulosis of sigmoid colon 12/21/2013     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, history of     Received Rituxan during Summer of 2016     Glaucoma      H/O esophageal varices      Hearing loss      Hyperlipidemia 04/10/2012    Says that she does not have it anymore, not on meds     Hypertension      Hypertriglyceridemia      Liver replaced by transplant (H) 10/17/2011    Dr. 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      PAST SURGICAL HISTORY:   has a past surgical history that includes Thyroidectomy (03/2010); appendectomy (1961); Cholecystectomy (1991); KNEE SCOPE,SINGLE MENISECTOMY (Right, 08/10/2012); Transplant liver recipient living unrelated (05/2002); knee surgery (1966); picc insertion (09/18/2013); Endoscopic retrograde " cholangiopancreatogram (09/19/2013); cataract iol, rt/lt; picc insertion (02/21/2014); Colonoscopy (03/10/2014); Endobronchial ultrasound flexible (N/A, 09/29/2017); ear drum repair; Cystoscopy; Insert stent ureter (Bilateral, 5/29/2025); and Colectomy with colostomy, combined (N/A, 5/29/2025).  FAMILY HISTORY: family history includes Allergies in her son; Alzheimer Disease in her paternal grandfather; Breast Cancer in an other family member; Cancer - colorectal in her maternal grandmother; Cerebrovascular Disease in her paternal grandmother; Endometrial Cancer in her mother; Glaucoma in her maternal grandfather; Heart Disease in her maternal grandfather and paternal grandfather; Hyperlipidemia in her mother; Hypertension in her mother and paternal grandmother; Macular Degeneration in her father; Neurologic Disorder in her daughter; Prostate Cancer in her father.  SOCIAL HISTORY:   reports that she quit smoking about 40 years ago. Her smoking use included cigarettes. She started smoking about 58 years ago. She has a 18 pack-year smoking history. She has never used smokeless tobacco. She reports current alcohol use. She reports that she does not use drugs.  Patient's living condition: lives alone    Post Discharge Medication Reconciliation Status:   MED REC REQUIRED  Post Medication Reconciliation Status:  Discharge medications reconciled and changed, see notes/orders       Current Outpatient Medications   Medication Sig Dispense Refill     acetaminophen (TYLENOL) 500 MG tablet Take 1,000 mg by mouth 2 times daily. During 4/16/25 med recc pt states take BID everyday       albuterol (PROAIR HFA/PROVENTIL HFA/VENTOLIN HFA) 108 (90 Base) MCG/ACT inhaler Inhale 2 puffs into the lungs every 4 hours as needed for shortness of breath, wheezing or cough. 18 g 0     apixaban ANTICOAGULANT (ELIQUIS) 2.5 MG tablet Take 1 tablet (2.5 mg) by mouth 2 times daily. 60 tablet 0     calcitRIOL (ROCALTROL) 0.25 MCG capsule Take 1  capsule (0.25 mcg) by mouth daily. 90 capsule 1     calcium carbonate (TUMS) 500 MG chewable tablet Take 2 tablets (1,000 mg) by mouth every 4 hours as needed for heartburn.       Continuous Glucose Sensor (FREESTYLE NINO 2 SENSOR) MISC Change every 14 days. 2 each 5     D-MANNOSE PO Take 1 Scoop by mouth 2 times daily.       diphenhydrAMINE (BENADRYL) 25 MG tablet Take 25 mg by mouth every 6 hours as needed for itching or allergies.       EPINEPHrine (ANY BX GENERIC EQUIV) 0.3 MG/0.3ML injection 2-pack Inject 0.3 mLs (0.3 mg) into the muscle as needed for anaphylaxis. May repeat one time in 5-15 minutes if response to initial dose is inadequate. 2 each 1     estradiol (ESTRACE) 0.1 MG/GM vaginal cream Place vaginally three times a week.       evolocumab (REPATHA SURECLICK) 140 MG/ML prefilled autoinjector Inject 1 mL (140 mg) subcutaneously every 14 days. 6 mL 3     ezetimibe (ZETIA) 10 MG tablet Take 1 tablet (10 mg) by mouth daily. 90 tablet 3     Ferrous Sulfate 324 MG TBEC Take 1 tablet by mouth daily.       folic acid (FOLVITE) 1 MG tablet TAKE 1 TABLET BY MOUTH DAILY 90 tablet 3     gabapentin (NEURONTIN) 250 MG/5ML solution Take 6 mLs (300 mg) by mouth at bedtime 180 mL 0     Glucagon (GVOKE HYPOPEN) 1 MG/0.2ML pen Inject the contents of 1 device under the skin into lower abdomen, outer thigh, or outer upper arm as needed for hypoglycemia. If no response after 15 minutes, additional 1 mg dose from a new device may be injected while waiting for emergency assistance.       glucose (BD GLUCOSE) 5 g chewable tablet Take 2 tablets (10 g) by mouth as needed (low blood sugar) 40 tablet 1     glucose 40 % (400 mg/mL) gel Take 15 g by mouth every 15 minutes as needed for low blood sugar.       levothyroxine (SYNTHROID/LEVOTHROID) 175 MCG tablet Take 1 tablet (175 mcg) by mouth daily. 90 tablet 1     linagliptin (TRADJENTA) 5 MG TABS tablet Take 1 tablet (5 mg) by mouth daily. 90 tablet 1     metoprolol succinate ER  (TOPROL XL) 50 MG 24 hr tablet Take 1 tablet (50 mg) by mouth daily.       metoprolol tartrate (LOPRESSOR) 25 MG tablet Take 1 tablet (25 mg) by mouth every 2 hours as needed (For HR over 120 for >5 minutes and symptoms of chest pressure, pain, or dizziness take x1. May take again in 2 hours if needed.). If HR remains high after 2 doses of this medication, please contact a medical provider       Multiple Vitamins-Minerals (PRESERVISION AREDS 2) CAPS Take 1 capsule by mouth 2 times daily       Nutrisource Fiber PO packet Take 1 packet by mouth daily. Benefiber       omega-3 acid ethyl esters (LOVAZA) 1 g capsule Take 1 capsule (1 g) by mouth 2 times daily.       ondansetron (ZOFRAN) 4 MG tablet Take 1 tablet (4 mg) by mouth every 6 hours as needed for nausea.       oxymetazoline (AFRIN) 0.05 % nasal spray Spray 0.2 mLs (2 sprays) into both nostrils 2 times daily as needed for congestion. 30 mL 0     predniSONE (DELTASONE) 1 MG tablet Take 4 tablets (4 mg) by mouth daily. Take 4mg (1mg tablets x4) and Take with 5mg tablet to equal 9mg daily 90 tablet 0     predniSONE (DELTASONE) 5 MG tablet Take 1 tablet (5 mg) by mouth daily. Take with 5mg tablet with 4mg (1mg tablets x4) to equal 9mg daily 30 tablet 0     sertraline (ZOLOFT) 50 MG tablet Take 1 tablet (50 mg) by mouth daily. 30 tablet 0     tacrolimus (GENERIC) 1 mg/mL suspension Take 2 mLs (2 mg) by mouth 2 times daily. 120 mL 11     triamcinolone (KENALOG) 0.1 % external ointment Apply topically 2 times daily.       ursodiol (ACTIGALL) 250 MG tablet Take 1 tablet (250 mg) by mouth 2 times daily. 180 tablet 3     vitamin A 3 MG (48256 UNITS) capsule Take 2 capsules (20,000 Units) by mouth daily for 18 days.       No current facility-administered medications for this visit.       ROS:  10 point ROS of systems including Constitutional, Eyes, Respiratory, Cardiovascular, Gastroenterology, Genitourinary, Integumentary, Musculoskeletal, Psychiatric were all negative  "except for pertinent positives noted in my HPI.    Vitals:  /63   Pulse 96   Temp 98.2  F (36.8  C)   Resp 18   Ht 1.702 m (5' 7\")   Wt 83.4 kg (183 lb 12.8 oz)   LMP 06/01/1988 (Approximate)   SpO2 93%   BMI 28.79 kg/m    Exam:  GENERAL APPEARANCE:  Alert, in no distress, cooperative  ENT:  Mouth and posterior oropharynx normal, moist mucous membranes, [place]  EYES:  EOM, conjunctivae, lids, pupils and irises normal  NECK:  No adenopathy,masses or thyromegaly  RESP:  respiratory effort and palpation of chest normal, lungs clear to auscultation , no respiratory distress  CV:  regular rate and rhythm, no murmur, rub, or gallop, no edema, +2 pedal pulses  ABDOMEN:  normal bowel sounds, soft, nontender, no hepatosplenomegaly or other masses, no guarding or rebound  M/S:   Angel lift for all transfers. Wheelchair bound. Staff to assist for all ADLs, transfers and cares  SKIN:  Inspection of skin and subcutaneous tissue baseline, wound healing well, no signs of infection see photos. Staples removed today  NEURO:   Cranial nerves 2-12 are normal tested and grossly at patient's baseline, no purposeful movement in upper and lower extremities  PSYCH:  oriented X 3, memory impaired , affect and mood normal            Lab/Diagnostic data:  Most Recent 3 CBC's:  Recent Labs   Lab Test 06/23/25  0543 06/22/25  2144 06/20/25  1047 06/16/25  1038   WBC 10.2 10.5 8.4 8.8   HGB 8.1* 8.5* 7.5* 7.5*   MCV 97 96 96 95   * 632*  --  210     Most Recent 3 BMP's:  Recent Labs   Lab Test 06/25/25  1152 06/25/25  0831 06/25/25  0811 06/24/25  2221 06/24/25  0806 06/24/25  0701 06/23/25  0552 06/23/25  0543 06/22/25  2144 06/20/25  1047   NA  --   --   --   --   --   --   --  141 137  137 138   POTASSIUM  --   --  5.0  --   --  4.9  --  4.6 5.5*  5.5* 4.6   CHLORIDE  --   --   --   --   --   --   --  107 104  104 103   CO2  --   --   --   --   --   --   --  22 19*  19* 23   BUN  --   --   --   --   --   --   --  38.0* " 41.0*  41.0* 40.9*   CR  --   --   --   --   --   --   --  1.56* 1.68*  1.68* 1.86*   ANIONGAP  --   --   --   --   --   --   --  12 14  14 12   KEILY  --   --   --   --   --   --   --  8.7* 8.9  8.9 8.5*   * 83  --  153*   < >  --    < > 89 180*  180* 92    < > = values in this interval not displayed.     Most Recent 2 LFT's:  Recent Labs   Lab Test 06/23/25  0543 06/22/25  2144   AST 17 23   ALT 26 27   ALKPHOS 124 135   BILITOTAL 0.2 0.2     Most Recent 3 Troponin's:  Recent Labs   Lab Test 05/18/18  0731   TROPI <0.015     Most Recent 3 BNP's:  Recent Labs   Lab Test 04/14/25  1701 03/08/25  1606 06/13/23  1820   NTBNPI 1,446 963* 1,275*     Most Recent Cholesterol Panel:  Recent Labs   Lab Test 09/04/24  1008   CHOL 291*   *   HDL 72   TRIG 329*     Most Recent TSH and T4:  Recent Labs   Lab Test 06/22/25  2144 01/20/25  1635 11/14/24  1506   TSH 4.16   < > 3.31   T4  --   --  1.92*    < > = values in this interval not displayed.     Most Recent Hemoglobin A1c:  Recent Labs   Lab Test 05/29/25  1515   A1C 6.3*     Most Recent Urinalysis:  Recent Labs   Lab Test 06/07/25  1317 04/07/25  0945 04/07/25  0935   COLOR Yellow   < > Light Yellow   APPEARANCE Clear   < > Slightly Cloudy*   URINEGLC 50*   < > Negative   URINEBILI Negative   < > Negative   URINEKETONE Negative   < > Negative   SG 1.033   < > 1.007   UBLD Negative   < > Trace*   URINEPH 5.5   < > 5.5   PROTEIN 50*   < > 10*   UROBILINOGEN  --   --  0.2   NITRITE Negative   < > Negative   LEUKEST Negative   < > Large*   RBCU 2   < > 2-5*   WBCU 4   < > >100*    < > = values in this interval not displayed.     Most Recent ESR & CRP:  Recent Labs   Lab Test 06/22/25  2254 04/28/25  1213 04/15/25  0613 06/13/23  1820 08/26/19  2331   SED  --   --  61*  --  78*   CRP  --   --   --   --  180.0*   CRPI 14.10*   < >  --    < >  --     < > = values in this interval not displayed.     Most Recent Anemia Panel:  Recent Labs   Lab Test 06/23/25  0543  04/14/25  1410 04/07/25  0945 09/04/24  1008 06/15/23  1028   WBC 10.2   < > 6.5   < > 7.6   HGB 8.1*   < > 9.1*   < > 12.1   HCT 27.2*   < > 30.0*   < > 38.4   MCV 97   < > 88   < > 92   *   < > 351   < > 345   IRON  --   --  36*   < >  --    IRONSAT  --   --  13*   < >  --    FEB  --   --  277   < >  --    LAURA  --   --  44   < >  --    B12  --   --   --   --  456    < > = values in this interval not displayed.     Magnesium   Date Value Ref Range Status   06/25/2025 2.1 1.7 - 2.3 mg/dL Final   08/05/2019 2.2 1.6 - 2.3 mg/dL Final     Vitamin D Deficiency Screening Results:  Lab Results   Component Value Date    VITDT 30 04/04/2025    VITDT 15 (L) 12/22/2012    VITDT 27 (L) 09/27/2012       ASSESSMENT/PLAN:  Diverticulitis complicated by abscess status post open sigmoidectomy with end colostomy 5/30/25  History of Sepsis due to Klebsiella pneumoniae and Pseudomonas aeruginosa bacteremia 5/28, likely of intra-abdominal origin   Hypotension due to sepsis  Comment: Long multiple hospitalization course. She required a slow steroid taper because of blood pressure dysregulations. Transitioned from meropenem to ceftolozane-tazobactam due to resistance (psuedomonas coverage) and switched vancomycin to linezolid in the setting of acute kidney injury for coverage of previous vancomycin-resistent enterococci (E Facecium). From infectious disease's perspective, there were no good oral antibiotic options and so she completed a 7-day IV antibiotic course at the hospital. There was new cloudy PATY drain output on 6/6 and ongoing leukocytosis but no new localizing symptoms so we continued to monitor and found. She had been seen in colorectal surgery clinic the day prior (5/27) where it was decided that she should undergo open sigmoidectomy with DLI.   Plan:  -Follow up with ID as directed. Scheduled for 7/2/25  -Follow up with Colorectal Surgery as directed. Scheduled for 7/15/25  -High dose Vitamin A x 30 days post-op  "(through 6/30)  -Change nutrisource supply to personal benefiber packet daily per request  -CMP, and CBC due 6/27/25  -Continue colostomy care as directed:  *Peristomal wound: With pouch changes - clean open wound to medial side of stoma with wound spray. Cut piece of Hydrofera Blue Classic to size of open area. Wet Hydrofera Blue (#[id number]) with saline and squeeze out excess, place in wound base, apply small bead of ostomy paste to the medial edge of hydrafera blue/Pt's skin to help hold in/seal hydrafera blue, then cover with additional ostomy ring to secure, apply wafer and caulk open creases around barrier ring, then attach pouch   *LLQ Colostomy pouching plan:   Pouching system: ostomy supplies pouches: [place] 57 FECAL ([id number]) ostomy supplies barrier: [place] 57mm SOFT CONVEX ([id number])  Accessories used: [facility] ostomy accessories: 2\" Cera Barrier Ring ([id number]) and Cavilon no sting barrier film ([id number])   Frequency of pouch changes: Twice weekly and PRN leakage  Bedside RN interventions: Change pouch PRN if leaking using the supplies above, Empty pouch when 1/3 to 1/2 full, ensure to clean pouch outlet after emptying to prevent odor, Notify WOC for ongoing pouch leakage, Document stoma appearance and output volume, color, and consistency every shift, Encourage patient to empty pouch with assist, and Assist patient to measure and record output      Liver transplant in 2002 2/2 primary biliary cirrhosis  EBV +  Reactive Leukocytosis   Sjogren's syndrome  Comment: Chronic. Follows Dr. Ramirez. Transplant Hepatology was consulted and advised against sirolimus. PTA prednisone 9mg was resumed after weaning off IV hydrocortisone   Plan:  -Continue Tacrolimus Oral suspension 2mg/2mL BID. Must be suspension given unable to tolerate capsule due to allergy to filler--to be filled by [facility]  -Continue prednisone 9mg daily for now. (5mg with 4 tablets of 1mg=9mg total)  -Continue PTA ursodiol 250 mg " BID  -Follow up with transplant team as directed. Scheduled for 8/22/25  -Follow up with nephrology as directed. Scheduled for 7/23/25  -CMP, and CBC due 6/27/25     Depression   Protein calorie deficit malnutrition    Comment: Chronic. Appetite fair. She is worried about some weight loss, however per PCC her weights have been stable around 180# since April 2025. SLUMS 26/30  Plan:  -Monitor mood and behaviors  -Dietician to remain involved to assist with supplemental needs  -Continue chocolate glucerna BID with meals for now--dietician to adjust accordingly  -Monitor for worsening s/symptoms of concerns  -Monitor for changes in mobility, eating and sleeping patterns  -Continue zoloft 50mg daily.   -ACP while on TCU  -CMP, and CBC due 6/27/25     Mild Obstructive Sleep Apnea   Nocturnal Hypoxemia   Pleural effusions  Shortness of breath  Acute respiratory failure with hypoxia  Comment: Chronic. Sleep study in 2018 showing mild RASHIDA with recommendation to start CPAP. It does not appear that there was further follow up and not using CPAP.   Plan:  -Monitor sleeping patterns  -Continue oxygen 1-2L via NC to keep sats >88% PRN  -Continue albuterol inhaler every 4 hours PRN. May HUNTER  -Monitor respiratory status  -PCP to consider sleep medicine referral  -CMP, and CBC due 6/27/25     CKD3b w/ moderate proteinuria, at baseline  HTN  CVD/HLD  Atrial fibrillation with RVR  Comment: Chronic. Baseline Cr. 1.5. History of dialysis at time of liver transplant 23 years ago. Recurrent AKIs and immunosuppressive medication toxicity. Not on SGLT2i 2/2 recurrent UTI. Chart review w/ unclear heart failure history, unconfirmed with EF 60-65% on 3/10/25. BNP 1446 elevated from 1 month ago. Per cardiology on 6/17/25 Loop recording: Patient has been in persistent AF since 6/2/25. She has a history of pAF but this is the first persistent episode logged. She was hospitalized from 5/28-6/12 with septic shock secondary to diverticulitis  complicated by abscess which required sigmoidectomy and colostomy. She states that during her hospitalization her telemetry frequently alerted for HRs in the 40's. Her loop recorder is set to alert for HRs < 40bpm... Per ED/recent hospitalization: She is tachycardic with a heart rate of 120. EKG was done upon arrival and this demonstrates atrial fibrillation with RVR with a rate of 138.  Here in the ED she has had rates ranging from 110s to 140s.  Blood pressure is acceptable at 133/100. Suspect her atrial fibrillation is due to the fact that she has been off of her rate control and antihypertensive medication/beta-blocker since her recent critical illness.  Recent echocardiogram dated 6/4/2025 shows normal EF of 60 to 65%. We did give the patient a dose of diltiazem here in the emergency department followed by a diltiazem drip.  We did establish IV access and we did draw blood for laboratory analysis.  CBC shows a normal white count of 10.5, hemoglobin is 8.5, up from 7.5 from 2 days ago, platelets are 632, this thrombocytosis appears to be new, CMP shows CKD with a creatinine of 1.68, consistent with previous, mild hyperkalemia with a potassium of 5.5, otherwise electrolytes and LFTs appear to be within normal limits, magnesium within normal limits at 2.0, TSH within normal limits at 4.16, troponin is elevated at 54, this does appear to be somewhat above her baseline.  Repeat troponin is flat at 54.  Chest x-ray per my read does not show any evidence of infiltrates.  Radiology overread makes note of hazy opacities in the right base which may reflect focal pleural calcifications without signs of pneumonia or heart failure. For her very mild hyperkalemia with potassium of 5.5, we have given a small fluid bolus. Patient was given a dose of diltiazem here in the emergency department, and a diltiazem drip was also ordered.  This seems to have helped with heart rate control, heart rate is currently 105.    Plan:  -Continue metoprolol succinate 25mg daily. HOLD if SBP<100 and/or HR<55  -Continue metoprolol tartrate TID PRN if HR>110. If used >2 time, advise to send to ED  -Monitor BP and HR  -Continue PTA evolocumab q2week.   -Continue PTA eztimibe 10mg every day  -Continue apixaban 2.5mg BID  -Follow up with cardiology as directed. Advised to schedule ASAP  -CMP, and CBC due 6/27/25     Concern for transient ischemic attack  Comment: Acute. In the morning of 6/4, she stated that she had speech problems in the last couple of days since the surgery. She described that she has trouble getting her words out and that her speech did not feel as fluent as it was previously.  She denied any new numbness, tingling, headache or changes in vision. CT head was negative for intracranial bleed or other acute process and TTE with bubble study was also negative. It is possible that she may have had a TIA following surgery as anticoagulation had been held. MRI brain was negative for acute ischemic changes and her speech has returned to baseline.   Plan:  -Continue PTA Xarelto   -No need for neuro follow-up   -CMP, and CBC due 6/27/25     T2DM  (A1c 6.8% 4/14/25)  Comment: Chronic. Last A1c 6.8% on 4/14/25.   Plan:  -Continue PTA linagliptin 5mg every day  -Continue PTA gabapentin 300mg at bedtime  -Blood Glucose monitoring back to previous schedule of BID. HOLD off on using freestyle phil 2 sensor while on TCU  -CMP, and CBC due 6/27/25  -Obtain hgb A1c as directed. Due July 2025     Acute kidney injury on CKD3, resolved   Bilateral stent placement per urology 5/29  Acute tubular necrosis  Comment: Her baseline creatinine is 1.3-1.6. She had an DERREK in the setting of hypotension, shock, and nephrotoxic meds which resolved after receiving IV fluids and improved PO after surgery.   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 Wound does not look infected. She now report having skin lesion to left anterior forearm. 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 Will defer to dermatology  Plan:  -Monitor for worsening s/symptoms of concerns  -Eucerin to face to moisturize PRN. May keep at bedside and self apply  -Follow up with dermatology --advised to contact when able     Acute on chronic anemia  coagulopathy due to cirrhosis and surgical blood loss    thrombocytopenia   Comment: Chronic. Per recent hospitalization-Did not find any signs of overt bleeding and her ostomy output and vitals were stable. Continued her ferous sulfate and folic acid inpatient. She received 1 unit of packed red blood cells on 6/7. Stable at the time of discharge. Now average around mid 7  Plan:  -Monitor bleeding risks  -Continue PTA ferrous sulfate daily  -Recommend transfusion if hgb <7, low threshold to send to ED for transfusions needs if indicated  -Continue folic acid daily  -CMP, and CBC due 6/27/25     Hypothyroidism  Papillary thyroid carcinoma  Chronic. Recent TSH~1.72 on 4/4/25. S/p thyroidectomy 2010  Plan;  -Continue PTA levothyroxine 175mcg qD   -Monitor for worsening s/symptoms of concerns  -Repeat thyroid panel in July 2025     Chronic Shoulder Pain  Osteoarthritis   Lower back pain  Comment: Chronic. stable  Plan:  -Monitor pain complaints  -Continue diclofenac gel application to painful areas QID  -Continue Tylenol 1000mg BID   -Continue gabapentin 300mg at HS--only able to tolerate liquid given gelatin capsule allergy     Constipation   Nausea  GERD  Comment: Chronic. S/T polypharmacy.   Plan:  -Monitor BM patterns  -Continue TUMS 1000mg every 4 hours PRN  -Continue beneFiber qd    -Zofran PRN     Epistaxis  Comment: Acute on chronic. She reports occasional nose bleed at times. No episodes while on TCU.   Plan:  -Monitor bleeding  risks  -Monitor for worsening s/symptoms of concerns  -Afrin BID PRN on TCU if warranted     Vaginal bleeding  Comment: Acute on chronic. Referred to OBGYN. Last menstrual period she reports has been 40 years ago or so. Known family history of endometrial cancer that metastasized to bowel per her reports. She reports her mother had extensive genetic testing for review in [facility]. Reports mother name: Anne Yap  3/9/23 for reference if indicated. Per OBGYN on 25:  If the endometrial stripe is greater than 4 mm or if it is less than 4 mm and she continues to have bleeding, she will need tissue evaluation with either office endometrial biopsy or hysteroscopy with D&C in the operating room. She does not believe she can tolerate an endometrial biopsy in the clinic, so we discussed doing this as an outpatient procedure.  Plan:  -Monitor for worsening s/symptoms of concerns  -Follow up with OBGYN as directed  -CMP, and CBC due 25     Physical deconditioning  Generalized muscle weakness  History of falls  Comment: Acute on chronic. Known poor history PTA. [facility] given concerns. Per therapy-Patient requires MOdA for bed and UB needs. Maximum assistance for LB needs and toileting. Angel lift for all transfers given weakness. Increased anxiety and fear with transfers since TCU return.   Plan:  -Continue Physical therapy and Occupational therapy  -Recommend cognitive testing on site  -Highly recommend MIKEY compliance post TCU     53 minutes spent on the date of the encounter doing chart review, history and exam, PMH, documentation and further activities as noted above. Collaboration with nursing staff on site, clinical managers, and therapies were completed on site today.     Electronically signed by:  Dr. Manda Flor DNP, APRN, FNP-C, WCS-C, EDS-C     Provider reviewed records from facility, and interpreted most recent imaging/lab work, and vital signs.   Acute and chronic  conditions managed by writer. Have been reviewed during today's exam                  Sincerely,        LIZ Obando CNP    Electronically signed

## 2025-06-27 ENCOUNTER — LAB REQUISITION (OUTPATIENT)
Dept: LAB | Facility: CLINIC | Age: 75
End: 2025-06-27
Payer: MEDICARE

## 2025-06-27 DIAGNOSIS — N18.30 CHRONIC KIDNEY DISEASE, STAGE 3 UNSPECIFIED (H): ICD-10-CM

## 2025-06-27 DIAGNOSIS — K65.1 PERITONEAL ABSCESS (H): ICD-10-CM

## 2025-06-27 DIAGNOSIS — D64.9 ANEMIA, UNSPECIFIED: ICD-10-CM

## 2025-06-27 DIAGNOSIS — K65.0: ICD-10-CM

## 2025-06-27 DIAGNOSIS — I15.2 HYPERTENSION SECONDARY TO ENDOCRINE DISORDERS: ICD-10-CM

## 2025-06-29 NOTE — PROGRESS NOTES
[facility]    Chief Complaint   Patient presents with    RECHECK     HPI:  Luz Thompson is a 75 year old  (1950), who is being seen today for an episodic care visit at: [facility] ([facility])([facility]) [[id number]]. Today's concern is: The primary encounter diagnosis was Anxiety. Diagnoses of Colostomy present (H), Diverticulitis of colon, Chronic shoulder pain, unspecified laterality, Acute on chronic anemia, Type 2 diabetes mellitus with stage 3b chronic kidney disease, without long-term current use of insulin (H), Atrial fibrillation with RVR (H), Status post liver transplantation (H), Hypothyroidism, unspecified type, Bilateral low back pain without sciatica, unspecified chronicity, Stage 3b chronic kidney disease (H), SOB (shortness of breath), Physical deconditioning, Hypoxia, S/P laparoscopic-assisted sigmoidectomy, Intra-abdominal infection, Hx of sepsis, Hypotension, unspecified hypotension type, Generalized muscle weakness, Age-related osteoporosis without current pathological fracture, Chronic anemia, S/P Mohs surgery for basal cell carcinoma, 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    Met with patient who was found lying in recliner. She denies any chest pain, palpitations, shortness of breath, [place], lightheadedness, dizziness, or cough. Some noted increased anxiety with certain staff with zain lift. She denies any abdominal discomfort. Denies N&V. She is concerned regarding redness appearance to around stoma site. She is worried about infection. Noted colostomy pouch leaking stool therefore pouch was changed. Today does not appear infected  "in observation. Stools are soft and brown in color. Denies dysuria or frequency. No further vaginal bleeding. Appetite good. Sleeping well.     BP Readings from Last 3 Encounters:   06/30/25 138/82   06/26/25 121/63   06/25/25 122/82     Wt Readings from Last 5 Encounters:   06/30/25 83.4 kg (183 lb 12.8 oz)   06/26/25 83.4 kg (183 lb 12.8 oz)   06/22/25 82.1 kg (181 lb)   06/20/25 82.1 kg (181 lb)   06/18/25 84.2 kg (185 lb 9.6 oz)     Allergies, and PMH/PSH reviewed in EPIC today.  REVIEW OF SYSTEMS:  4 point ROS including Respiratory, CV, GI and , other than that noted in the HPI,  is negative    Objective:   /82   Pulse 88   Temp 97.9  F (36.6  C)   Resp 18   Ht 1.702 m (5' 7\")   Wt 83.4 kg (183 lb 12.8 oz)   LMP 06/01/1988 (Approximate)   SpO2 95%   BMI 28.79 kg/m    GENERAL APPEARANCE:  Alert, in no distress, oriented, cooperative  ENT:  Mouth and posterior oropharynx normal, moist mucous membranes, [place]  EYES:  EOM, conjunctivae, lids, pupils and irises normal  NECK:  No adenopathy,masses or thyromegaly  RESP:  respiratory effort and palpation of chest normal, lungs clear to auscultation , no respiratory distress  CV:  regular rate and rhythm, no murmur, rub, or gallop, trace edema to BLE  ABDOMEN:  normal bowel sounds, soft, nontender, no hepatosplenomegaly or other masses, no guarding or rebound  M/S:   Maximum assistance for all ADLs, transfers and cares. Wheelchair bound. Limited progression S/T anxiety  SKIN:  Inspection of skin and subcutaneous tissue baseline, wound healing well, no signs of infection see photo  NEURO:   Cranial nerves 2-12 are normal tested and grossly at patient's baseline, no purposeful movement in upper and lower extremities  PSYCH:  oriented X 3, memory impaired , affect and mood normal      Most Recent 3 CBC's:  Recent Labs   Lab Test 06/27/25  1013 06/23/25  0543 06/22/25  2144   WBC 9.3 10.2 10.5   HGB 8.8* 8.1* 8.5*   * 97 96   * 638* 632*     Most " Recent 3 BMP's:  Recent Labs   Lab Test 06/27/25  1013 06/25/25  1152 06/25/25  0831 06/25/25  0811 06/24/25  0806 06/24/25  0701 06/23/25  0552 06/23/25  0543 06/22/25  2144     --   --   --   --   --   --  141 137  137   POTASSIUM 4.9  --   --  5.0  --  4.9  --  4.6 5.5*  5.5*   CHLORIDE 107  --   --   --   --   --   --  107 104  104   CO2 22  --   --   --   --   --   --  22 19*  19*   BUN 32.0*  --   --   --   --   --   --  38.0* 41.0*  41.0*   CR 1.42*  --   --   --   --   --   --  1.56* 1.68*  1.68*   ANIONGAP 11  --   --   --   --   --   --  12 14  14   KEILY 8.8  --   --   --   --   --   --  8.7* 8.9  8.9   GLC 91 104* 83  --    < >  --    < > 89 180*  180*    < > = values in this interval not displayed.     Most Recent 2 LFT's:  Recent Labs   Lab Test 06/27/25  1013 06/23/25  0543   AST 17 17   ALT 17 26   ALKPHOS 101 124   BILITOTAL <0.2 0.2     Most Recent Hemoglobin A1c:  Recent Labs   Lab Test 05/29/25  1515   A1C 6.3*     Most Recent Anemia Panel:  Recent Labs   Lab Test 06/27/25  1013 04/14/25  1410 04/07/25  0945 09/04/24  1008 06/15/23  1028   WBC 9.3   < > 6.5   < > 7.6   HGB 8.8*   < > 9.1*   < > 12.1   HCT 30.6*   < > 30.0*   < > 38.4   *   < > 88   < > 92   *   < > 351   < > 345   IRON  --   --  36*   < >  --    IRONSAT  --   --  13*   < >  --    FEB  --   --  277   < >  --    LAURA  --   --  44   < >  --    B12  --   --   --   --  456    < > = values in this interval not displayed.       ASSESSMENT/PLAN:  Diverticulitis complicated by abscess status post open sigmoidectomy with end colostomy 5/30/25  History of Sepsis due to Klebsiella pneumoniae and Pseudomonas aeruginosa bacteremia 5/28, likely of intra-abdominal origin   Hypotension due to sepsis  Comment: Long multiple hospitalization course. She required a slow steroid taper because of blood pressure dysregulations. Transitioned from meropenem to ceftolozane-tazobactam due to resistance (psuedomonas coverage) and  "switched vancomycin to linezolid in the setting of acute kidney injury for coverage of previous vancomycin-resistent enterococci (E Facecium). From infectious disease's perspective, there were no good oral antibiotic options and so she completed a 7-day IV antibiotic course at the hospital. There was new cloudy PATY drain output on 6/6 and ongoing leukocytosis but no new localizing symptoms so we continued to monitor and found. She had been seen in colorectal surgery clinic the day prior (5/27) where it was decided that she should undergo open sigmoidectomy with DLI.   Plan:  -Follow up with ID as directed. Scheduled for 7/2/25--virtual  -Follow up with Colorectal Surgery as directed. Scheduled for 7/15/25  -High dose Vitamin A x 30 days post-op (through 6/30)  -Continue benefiber packet daily per request  -BMP and CBC due 7/1/25  -Continue colostomy care as directed:  *Peristomal wound: With pouch changes - clean open wound to medial side of stoma with wound spray. Cut piece of Hydrofera Blue Classic to size of open area. Wet Hydrofera Blue (#[id number]) with saline and squeeze out excess, place in wound base, apply small bead of ostomy paste to the medial edge of hydrafera blue/Pt's skin to help hold in/seal hydrafera blue, then cover with additional ostomy ring to secure, apply wafer and caulk open creases around barrier ring, then attach pouch   *LLQ Colostomy pouching plan:   Pouching system: ostomy supplies pouches: [place] 57 FECAL ([id number]) ostomy supplies barrier: [place] 57mm SOFT CONVEX ([id number]). Accessories used: [facility] ostomy accessories: 2\" Cera Barrier Ring ([id number]), Powder ([id number]), and Cavilon no sting barrier film ([id number]). Apply stoma powder to any weepy open areas of skin around stoma.  Brush off all excess powder that does not stick. Apply Cavilon skin prep over powder - let dry before applying new pouch. Pull abdomen flat when applying new pouch (makes the stoma a [place] shape instead of oval). (See " photo from 6/23/25 in Media). 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 Follows Dr. Ramirez. Transplant Hepatology was consulted and advised against sirolimus. PTA prednisone 9mg was resumed after weaning off IV hydrocortisone   Plan:  -Continue Tacrolimus Oral suspension 2mg/2mL BID. Must be suspension given unable to tolerate capsule due to allergy to filler--to be filled by [facility]  -Continue prednisone 9mg daily for now. (5mg with 4 tablets of 1mg=9mg total)  -Continue PTA ursodiol 250 mg BID  -Follow up with transplant team as directed. Scheduled for 8/22/25  -Follow up with nephrology as directed. Scheduled for 7/23/25  -BMP and CBC due 7/1/25     Depression   Protein calorie deficit malnutrition    Comment: Chronic. Appetite fair. She is worried about some weight loss, however per PCC her weights have been stable around 180# since April 2025. [facility] 26/30  Plan:  -Monitor mood and behaviors  -Dietician to remain involved to assist with supplemental needs  -Continue chocolate glucerna BID with meals for now--dietician to adjust accordingly  -Monitor for worsening s/symptoms of concerns  -Monitor for changes in mobility, eating and sleeping patterns  -Continue zoloft 50mg daily.   -ACP while on TCU  -BMP and CBC due 7/1/25     Mild Obstructive Sleep Apnea   Nocturnal Hypoxemia   Pleural effusions  Shortness of breath  Acute respiratory failure with hypoxia  Comment: Chronic. Sleep study in 2018 showing mild RASHIDA with recommendation to start CPAP. It does not appear that there was further follow up and not using CPAP.   Plan:  -Monitor sleeping patterns  -Continue oxygen 1-2L via NC to keep sats >88% PRN  -Continue albuterol inhaler every 4 hours PRN. May HUNTER  -Monitor respiratory status  -Follow up with sleep medicine when able  -BMP and CBC due 7/1/25     CKD3b w/  moderate proteinuria, at baseline  HTN  CVD/HLD  Atrial fibrillation with RVR  Comment: Chronic. Baseline Cr. 1.5. History of dialysis at time of liver transplant 23 years ago. Recurrent AKIs and immunosuppressive medication toxicity. Not on SGLT2i 2/2 recurrent UTI. Chart review w/ unclear heart failure history, unconfirmed with EF 60-65% on 3/10/25. BNP 1446 elevated from 1 month ago. Per cardiology on 6/17/25 Loop recording: Patient has been in persistent AF since 6/2/25. She has a history of pAF but this is the first persistent episode logged. She was hospitalized from 5/28-6/12 with septic shock secondary to diverticulitis complicated by abscess which required sigmoidectomy and colostomy. She states that during her hospitalization her telemetry frequently alerted for HRs in the 40's. Her loop recorder is set to alert for HRs < 40bpm... Per ED/recent hospitalization: She is tachycardic with a heart rate of 120. EKG was done upon arrival and this demonstrates atrial fibrillation with RVR with a rate of 138.  Here in the ED she has had rates ranging from 110s to 140s.  Blood pressure is acceptable at 133/100. Suspect her atrial fibrillation is due to the fact that she has been off of her rate control and antihypertensive medication/beta-blocker since her recent critical illness.  Recent echocardiogram dated 6/4/2025 shows normal EF of 60 to 65%. We did give the patient a dose of diltiazem here in the emergency department followed by a diltiazem drip.  We did establish IV access and we did draw blood for laboratory analysis.  CBC shows a normal white count of 10.5, hemoglobin is 8.5, up from 7.5 from 2 days ago, platelets are 632, this thrombocytosis appears to be new, CMP shows CKD with a creatinine of 1.68, consistent with previous, mild hyperkalemia with a potassium of 5.5, otherwise electrolytes and LFTs appear to be within normal limits, magnesium within normal limits at 2.0, TSH within normal limits at 4.16,  troponin is elevated at 54, this does appear to be somewhat above her baseline.  Repeat troponin is flat at 54.    Plan:  -Continue metoprolol succinate 25mg daily. HOLD if SBP<100 and/or HR<55  -Continue metoprolol tartrate TID PRN if HR>110. If used >2 time, advise to send to ED  -Monitor BP and HR  -Continue PTA evolocumab q2week.   -Continue PTA eztimibe 10mg every day  -Continue apixaban 2.5mg BID  -Follow up with cardiology as directed. Scheduled for 7/21/25  -BMP and CBC due 7/1/25                 Concern for transient ischemic attack  Comment: Acute. In the morning of 6/4, she stated that she had speech problems in the last couple of days since the surgery. She described that she has trouble getting her words out and that her speech did not feel as fluent as it was previously.  She denied any new numbness, tingling, headache or changes in vision. CT head was negative for intracranial bleed or other acute process and TTE with bubble study was also negative. It is possible that she may have had a TIA following surgery as anticoagulation had been held. MRI brain was negative for acute ischemic changes and her speech has returned to baseline.   Plan:  -Continue PTA Xarelto   -No need for neuro follow-up   -BMP and CBC due 7/1/25     T2DM  (A1c 6.8% 4/14/25)  Comment: Chronic. Last A1c 6.8% on 4/14/25.   Plan:  -Continue PTA linagliptin 5mg every day  -Continue PTA gabapentin 300mg at bedtime  -Blood Glucose monitoring back to previous schedule of BID. HOLD off on using freestyle phil 2 sensor while on TCU  -BMP and CBC due 7/1/25  -Obtain hgb A1c as directed. Due July 2025         Acute kidney injury on CKD3, resolved   Bilateral stent placement per urology 5/29  Acute tubular necrosis  Comment: Her baseline creatinine is 1.3-1.6. She had an DERREK in the setting of hypotension, shock, and nephrotoxic meds which resolved after receiving IV fluids and improved PO after surgery.   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 Wound does not look infected. She now report having skin lesion to left anterior forearm. 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 Will defer to dermatology  Plan:  -Monitor for worsening s/symptoms of concerns  -Eucerin to face to moisturize PRN. May keep at bedside and self apply  -Follow up with dermatology --advised to contact when able     Acute on chronic anemia  coagulopathy due to cirrhosis and surgical blood loss    thrombocytopenia   Comment: Chronic. Per recent hospitalization-Did not find any signs of overt bleeding and her ostomy output and vitals were stable. Continued her ferous sulfate and folic acid inpatient. She received 1 unit of packed red blood cells on 6/7. Stable at the time of discharge. Now average around mid 7  Plan:  -Monitor bleeding risks  -Continue PTA ferrous sulfate daily  -Recommend transfusion if hgb <7, low threshold to send to ED for transfusions needs if indicated  -Continue folic acid daily  -BMP and CBC due 7/1/25     Hypothyroidism  Papillary thyroid carcinoma  Chronic. Recent TSH~1.72 on 4/4/25. S/p thyroidectomy 2010  Plan;  -Continue PTA levothyroxine 175mcg qD   -Monitor for worsening s/symptoms of concerns  -Repeat thyroid panel in July 2025     Chronic Shoulder Pain  Osteoarthritis   Lower back pain  Comment: Chronic. stable  Plan:  -Monitor pain complaints  -Continue diclofenac gel application to painful areas QID  -Continue Tylenol 1000mg BID   -Continue gabapentin 300mg at HS--only able to tolerate liquid given gelatin capsule allergy     Constipation   Nausea  GERD  Comment: Chronic. S/T polypharmacy.   Plan:  -Monitor BM patterns  -Continue TUMS 1000mg every 4 hours PRN  -Continue beneFiber qd    -Zofran PRN      Epistaxis  Comment: Acute on chronic. She reports occasional nose bleed at times. No episodes while on TCU.   Plan:  -Monitor bleeding risks  -Monitor for worsening s/symptoms of concerns  -Afrin BID PRN on TCU if warranted     Vaginal bleeding  Comment: Acute on chronic. Referred to OBGYN. Last menstrual period she reports has been 40 years ago or so. Known family history of endometrial cancer that metastasized to bowel per her reports. She reports her mother had extensive genetic testing for review in EPIC. Reports mother name: Anne Yap  3/9/23 for reference if indicated. Per OBGYN on 25:  If the endometrial stripe is greater than 4 mm or if it is less than 4 mm and she continues to have bleeding, she will need tissue evaluation with either office endometrial biopsy or hysteroscopy with D&C in the operating room. She does not believe she can tolerate an endometrial biopsy in the clinic, so we discussed doing this as an outpatient procedure.  Plan:  -Monitor for worsening s/symptoms of concerns  -Follow up with OBGYN as directed  -BMP and CBC due 25     Physical deconditioning  Generalized muscle weakness  History of falls  Comment: Acute on chronic. Known poor history PTA. [facility] given concerns. Per therapy-Patient requires MOdA for bed and UB needs. Maximum assistance for LB needs and toileting. Angel lift for all transfers given weakness. Increased anxiety and fear with transfers since TCU return.   Plan:  -Continue Physical therapy and Occupational therapy  -Recommend cognitive testing on site  -Highly recommend long-term compliance post TCU     45 minutes spent on the date of the encounter doing chart review, history and exam, PMH, documentation and further activities as noted above. Collaboration with nursing staff on site, clinical managers, and therapies along with time to change colostomy pouch at bedside were completed on site today.     Electronically  signed by:  Dr. Manda Flor DNP, APRN, FNP-C, WCS-C, EDS-C     Provider reviewed records from facility, and interpreted most recent imaging/lab work, and vital signs.   Acute and chronic conditions managed by writer. Have been reviewed during today's exam

## 2025-06-30 ENCOUNTER — TRANSITIONAL CARE UNIT VISIT (OUTPATIENT)
Dept: GERIATRICS | Facility: CLINIC | Age: 75
End: 2025-06-30
Payer: MEDICARE

## 2025-06-30 VITALS
TEMPERATURE: 97.9 F | DIASTOLIC BLOOD PRESSURE: 82 MMHG | RESPIRATION RATE: 18 BRPM | OXYGEN SATURATION: 95 % | BODY MASS INDEX: 28.85 KG/M2 | SYSTOLIC BLOOD PRESSURE: 138 MMHG | HEART RATE: 88 BPM | HEIGHT: 67 IN | WEIGHT: 183.8 LBS

## 2025-06-30 DIAGNOSIS — B99.9 INTRA-ABDOMINAL INFECTION: ICD-10-CM

## 2025-06-30 DIAGNOSIS — N18.32 STAGE 3B CHRONIC KIDNEY DISEASE (H): ICD-10-CM

## 2025-06-30 DIAGNOSIS — R09.02 HYPOXIA: ICD-10-CM

## 2025-06-30 DIAGNOSIS — N93.9 VAGINAL BLEEDING: ICD-10-CM

## 2025-06-30 DIAGNOSIS — I95.9 HYPOTENSION, UNSPECIFIED HYPOTENSION TYPE: ICD-10-CM

## 2025-06-30 DIAGNOSIS — E11.22 TYPE 2 DIABETES MELLITUS WITH STAGE 3B CHRONIC KIDNEY DISEASE, WITHOUT LONG-TERM CURRENT USE OF INSULIN (H): ICD-10-CM

## 2025-06-30 DIAGNOSIS — I10 HYPERTENSION GOAL BP (BLOOD PRESSURE) < 140/80: ICD-10-CM

## 2025-06-30 DIAGNOSIS — M25.519 CHRONIC SHOULDER PAIN, UNSPECIFIED LATERALITY: ICD-10-CM

## 2025-06-30 DIAGNOSIS — C44.329 SQUAMOUS CELL CANCER OF SKIN OF LEFT CHEEK: ICD-10-CM

## 2025-06-30 DIAGNOSIS — F32.A DEPRESSION, UNSPECIFIED DEPRESSION TYPE: ICD-10-CM

## 2025-06-30 DIAGNOSIS — G89.29 CHRONIC SHOULDER PAIN, UNSPECIFIED LATERALITY: ICD-10-CM

## 2025-06-30 DIAGNOSIS — N18.32 TYPE 2 DIABETES MELLITUS WITH STAGE 3B CHRONIC KIDNEY DISEASE, WITHOUT LONG-TERM CURRENT USE OF INSULIN (H): ICD-10-CM

## 2025-06-30 DIAGNOSIS — K57.32 DIVERTICULITIS OF COLON: ICD-10-CM

## 2025-06-30 DIAGNOSIS — R53.81 PHYSICAL DECONDITIONING: ICD-10-CM

## 2025-06-30 DIAGNOSIS — E78.5 HYPERLIPIDEMIA LDL GOAL <70: ICD-10-CM

## 2025-06-30 DIAGNOSIS — M54.50 BILATERAL LOW BACK PAIN WITHOUT SCIATICA, UNSPECIFIED CHRONICITY: ICD-10-CM

## 2025-06-30 DIAGNOSIS — D64.9 CHRONIC ANEMIA: ICD-10-CM

## 2025-06-30 DIAGNOSIS — M62.81 GENERALIZED MUSCLE WEAKNESS: ICD-10-CM

## 2025-06-30 DIAGNOSIS — I48.91 ATRIAL FIBRILLATION WITH RVR (H): ICD-10-CM

## 2025-06-30 DIAGNOSIS — R06.02 SOB (SHORTNESS OF BREATH): ICD-10-CM

## 2025-06-30 DIAGNOSIS — Z98.890 S/P MOHS SURGERY FOR BASAL CELL CARCINOMA: ICD-10-CM

## 2025-06-30 DIAGNOSIS — D72.829 LEUKOCYTOSIS, UNSPECIFIED TYPE: ICD-10-CM

## 2025-06-30 DIAGNOSIS — D64.9 ACUTE ON CHRONIC ANEMIA: ICD-10-CM

## 2025-06-30 DIAGNOSIS — Z85.828 S/P MOHS SURGERY FOR BASAL CELL CARCINOMA: ICD-10-CM

## 2025-06-30 DIAGNOSIS — F41.9 ANXIETY: Primary | ICD-10-CM

## 2025-06-30 DIAGNOSIS — G47.33 OSA (OBSTRUCTIVE SLEEP APNEA): ICD-10-CM

## 2025-06-30 DIAGNOSIS — G47.34 NOCTURNAL HYPOXEMIA: ICD-10-CM

## 2025-06-30 DIAGNOSIS — I50.32 CHRONIC DIASTOLIC HEART FAILURE (H): ICD-10-CM

## 2025-06-30 DIAGNOSIS — Z93.3 COLOSTOMY PRESENT (H): ICD-10-CM

## 2025-06-30 DIAGNOSIS — E03.9 HYPOTHYROIDISM, UNSPECIFIED TYPE: ICD-10-CM

## 2025-06-30 DIAGNOSIS — Z86.19 HX OF SEPSIS: ICD-10-CM

## 2025-06-30 DIAGNOSIS — Z94.4 STATUS POST LIVER TRANSPLANTATION (H): ICD-10-CM

## 2025-06-30 DIAGNOSIS — Z90.49 S/P LAPAROSCOPIC-ASSISTED SIGMOIDECTOMY: ICD-10-CM

## 2025-06-30 DIAGNOSIS — Z91.81 PERSONAL HISTORY OF FALL: ICD-10-CM

## 2025-06-30 DIAGNOSIS — M81.0 AGE-RELATED OSTEOPOROSIS WITHOUT CURRENT PATHOLOGICAL FRACTURE: ICD-10-CM

## 2025-06-30 PROBLEM — N18.4 CKD (CHRONIC KIDNEY DISEASE) STAGE 4, GFR 15-29 ML/MIN (H): Status: RESOLVED | Noted: 2024-09-22 | Resolved: 2025-06-30

## 2025-06-30 LAB
ACANTHOCYTES BLD QL SMEAR: SLIGHT
ALBUMIN SERPL BCG-MCNC: 3 G/DL (ref 3.5–5.2)
ALP SERPL-CCNC: 101 U/L (ref 40–150)
ALT SERPL W P-5'-P-CCNC: 11 U/L (ref 0–50)
ANION GAP SERPL CALCULATED.3IONS-SCNC: 12 MMOL/L (ref 7–15)
AST SERPL W P-5'-P-CCNC: 18 U/L (ref 0–45)
BASOPHILS # BLD MANUAL: 0.1 10E3/UL (ref 0–0.2)
BASOPHILS NFR BLD MANUAL: 1 %
BILIRUB SERPL-MCNC: 0.2 MG/DL
BUN SERPL-MCNC: 30.9 MG/DL (ref 8–23)
BURR CELLS BLD QL SMEAR: SLIGHT
CALCIUM SERPL-MCNC: 9.5 MG/DL (ref 8.8–10.4)
CHLORIDE SERPL-SCNC: 105 MMOL/L (ref 98–107)
CREAT SERPL-MCNC: 1.35 MG/DL (ref 0.51–0.95)
CRP SERPL-MCNC: 7.23 MG/L
EGFRCR SERPLBLD CKD-EPI 2021: 41 ML/MIN/1.73M2
EOSINOPHIL # BLD MANUAL: 0 10E3/UL (ref 0–0.7)
EOSINOPHIL NFR BLD MANUAL: 0 %
ERYTHROCYTE [DISTWIDTH] IN BLOOD BY AUTOMATED COUNT: 21.5 % (ref 10–15)
FRAGMENTS BLD QL SMEAR: ABNORMAL
GLUCOSE SERPL-MCNC: 115 MG/DL (ref 70–99)
HCO3 SERPL-SCNC: 21 MMOL/L (ref 22–29)
HCT VFR BLD AUTO: 30.9 % (ref 35–47)
HGB BLD-MCNC: 9.1 G/DL (ref 11.7–15.7)
LYMPHOCYTES # BLD MANUAL: 2.8 10E3/UL (ref 0.8–5.3)
LYMPHOCYTES NFR BLD MANUAL: 28 %
MCH RBC QN AUTO: 29.4 PG (ref 26.5–33)
MCHC RBC AUTO-ENTMCNC: 29.4 G/DL (ref 31.5–36.5)
MCV RBC AUTO: 100 FL (ref 78–100)
MONOCYTES # BLD MANUAL: 0.6 10E3/UL (ref 0–1.3)
MONOCYTES NFR BLD MANUAL: 6 %
MYELOCYTES # BLD MANUAL: 1 10E3/UL
MYELOCYTES NFR BLD MANUAL: 10 %
NEUTROPHILS # BLD MANUAL: 5.7 10E3/UL (ref 1.6–8.3)
NEUTROPHILS NFR BLD MANUAL: 55 %
PLAT MORPH BLD: ABNORMAL
PLATELET # BLD AUTO: 630 10E3/UL (ref 150–450)
POLYCHROMASIA BLD QL SMEAR: SLIGHT
POTASSIUM SERPL-SCNC: 4.9 MMOL/L (ref 3.4–5.3)
PROT SERPL-MCNC: 6 G/DL (ref 6.4–8.3)
RBC # BLD AUTO: 3.1 10E6/UL (ref 3.8–5.2)
RBC MORPH BLD: ABNORMAL
SODIUM SERPL-SCNC: 138 MMOL/L (ref 135–145)
VARIANT LYMPHS BLD QL SMEAR: PRESENT
WBC # BLD AUTO: 10.1 10E3/UL (ref 4–11)

## 2025-06-30 PROCEDURE — 99310 SBSQ NF CARE HIGH MDM 45: CPT | Performed by: NURSE PRACTITIONER

## 2025-06-30 PROCEDURE — 82040 ASSAY OF SERUM ALBUMIN: CPT | Performed by: NURSE PRACTITIONER

## 2025-06-30 PROCEDURE — 85007 BL SMEAR W/DIFF WBC COUNT: CPT | Performed by: NURSE PRACTITIONER

## 2025-06-30 PROCEDURE — 36415 COLL VENOUS BLD VENIPUNCTURE: CPT | Performed by: NURSE PRACTITIONER

## 2025-06-30 PROCEDURE — 86140 C-REACTIVE PROTEIN: CPT | Performed by: NURSE PRACTITIONER

## 2025-06-30 PROCEDURE — 85018 HEMOGLOBIN: CPT | Performed by: NURSE PRACTITIONER

## 2025-06-30 NOTE — LETTER
6/30/2025      Luz Thompson  [address]  [facility] [address]        [facility]    Chief Complaint   Patient presents with     RECHECK     HPI:  Luz Thompson is a 75 year old  (1950), who is being seen today for an episodic care visit at: [facility] ([facility])([facility]) [[id number]]. Today's concern is: The primary encounter diagnosis was Anxiety. Diagnoses of Colostomy present (H), Diverticulitis of colon, Chronic shoulder pain, unspecified laterality, Acute on chronic anemia, Type 2 diabetes mellitus with stage 3b chronic kidney disease, without long-term current use of insulin (H), Atrial fibrillation with RVR (H), Status post liver transplantation (H), Hypothyroidism, unspecified type, Bilateral low back pain without sciatica, unspecified chronicity, Stage 3b chronic kidney disease (H), SOB (shortness of breath), Physical deconditioning, Hypoxia, S/P laparoscopic-assisted sigmoidectomy, Intra-abdominal infection, Hx of sepsis, Hypotension, unspecified hypotension type, Generalized muscle weakness, Age-related osteoporosis without current pathological fracture, Chronic anemia, S/P Mohs surgery for basal cell carcinoma, 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    Met with patient who was found lying in recliner. She denies any chest pain, palpitations, shortness of breath, [place], lightheadedness, dizziness, or cough. Some noted increased anxiety with certain staff with zain lift. She denies any abdominal discomfort. Denies N&V. She is concerned regarding redness appearance to around stoma site. She is worried about infection. Noted  "colostomy pouch leaking stool therefore pouch was changed. Today does not appear infected in observation. Stools are soft and brown in color. Denies dysuria or frequency. No further vaginal bleeding. Appetite good. Sleeping well.     BP Readings from Last 3 Encounters:   06/30/25 138/82   06/26/25 121/63   06/25/25 122/82     Wt Readings from Last 5 Encounters:   06/30/25 83.4 kg (183 lb 12.8 oz)   06/26/25 83.4 kg (183 lb 12.8 oz)   06/22/25 82.1 kg (181 lb)   06/20/25 82.1 kg (181 lb)   06/18/25 84.2 kg (185 lb 9.6 oz)     Allergies, and PMH/PSH reviewed in EPIC today.  REVIEW OF SYSTEMS:  4 point ROS including Respiratory, CV, GI and , other than that noted in the HPI,  is negative    Objective:   /82   Pulse 88   Temp 97.9  F (36.6  C)   Resp 18   Ht 1.702 m (5' 7\")   Wt 83.4 kg (183 lb 12.8 oz)   LMP 06/01/1988 (Approximate)   SpO2 95%   BMI 28.79 kg/m    GENERAL APPEARANCE:  Alert, in no distress, oriented, cooperative  ENT:  Mouth and posterior oropharynx normal, moist mucous membranes, [place]  EYES:  EOM, conjunctivae, lids, pupils and irises normal  NECK:  No adenopathy,masses or thyromegaly  RESP:  respiratory effort and palpation of chest normal, lungs clear to auscultation , no respiratory distress  CV:  regular rate and rhythm, no murmur, rub, or gallop, trace edema to BLE  ABDOMEN:  normal bowel sounds, soft, nontender, no hepatosplenomegaly or other masses, no guarding or rebound  M/S:   Maximum assistance for all ADLs, transfers and cares. Wheelchair bound. Limited progression S/T anxiety  SKIN:  Inspection of skin and subcutaneous tissue baseline, wound healing well, no signs of infection see photo  NEURO:   Cranial nerves 2-12 are normal tested and grossly at patient's baseline, no purposeful movement in upper and lower extremities  PSYCH:  oriented X 3, memory impaired , affect and mood normal      Most Recent 3 CBC's:  Recent Labs   Lab Test 06/27/25  1013 06/23/25  0543 " 06/22/25  2144   WBC 9.3 10.2 10.5   HGB 8.8* 8.1* 8.5*   * 97 96   * 638* 632*     Most Recent 3 BMP's:  Recent Labs   Lab Test 06/27/25  1013 06/25/25  1152 06/25/25  0831 06/25/25  0811 06/24/25  0806 06/24/25  0701 06/23/25  0552 06/23/25  0543 06/22/25  2144     --   --   --   --   --   --  141 137  137   POTASSIUM 4.9  --   --  5.0  --  4.9  --  4.6 5.5*  5.5*   CHLORIDE 107  --   --   --   --   --   --  107 104  104   CO2 22  --   --   --   --   --   --  22 19*  19*   BUN 32.0*  --   --   --   --   --   --  38.0* 41.0*  41.0*   CR 1.42*  --   --   --   --   --   --  1.56* 1.68*  1.68*   ANIONGAP 11  --   --   --   --   --   --  12 14  14   KEILY 8.8  --   --   --   --   --   --  8.7* 8.9  8.9   GLC 91 104* 83  --    < >  --    < > 89 180*  180*    < > = values in this interval not displayed.     Most Recent 2 LFT's:  Recent Labs   Lab Test 06/27/25  1013 06/23/25  0543   AST 17 17   ALT 17 26   ALKPHOS 101 124   BILITOTAL <0.2 0.2     Most Recent Hemoglobin A1c:  Recent Labs   Lab Test 05/29/25  1515   A1C 6.3*     Most Recent Anemia Panel:  Recent Labs   Lab Test 06/27/25  1013 04/14/25  1410 04/07/25  0945 09/04/24  1008 06/15/23  1028   WBC 9.3   < > 6.5   < > 7.6   HGB 8.8*   < > 9.1*   < > 12.1   HCT 30.6*   < > 30.0*   < > 38.4   *   < > 88   < > 92   *   < > 351   < > 345   IRON  --   --  36*   < >  --    IRONSAT  --   --  13*   < >  --    FEB  --   --  277   < >  --    LAURA  --   --  44   < >  --    B12  --   --   --   --  456    < > = values in this interval not displayed.       ASSESSMENT/PLAN:  Diverticulitis complicated by abscess status post open sigmoidectomy with end colostomy 5/30/25  History of Sepsis due to Klebsiella pneumoniae and Pseudomonas aeruginosa bacteremia 5/28, likely of intra-abdominal origin   Hypotension due to sepsis  Comment: Long multiple hospitalization course. She required a slow steroid taper because of blood pressure  "dysregulations. Transitioned from meropenem to ceftolozane-tazobactam due to resistance (psuedomonas coverage) and switched vancomycin to linezolid in the setting of acute kidney injury for coverage of previous vancomycin-resistent enterococci (E Facecium). From infectious disease's perspective, there were no good oral antibiotic options and so she completed a 7-day IV antibiotic course at the hospital. There was new cloudy PATY drain output on 6/6 and ongoing leukocytosis but no new localizing symptoms so we continued to monitor and found. She had been seen in colorectal surgery clinic the day prior (5/27) where it was decided that she should undergo open sigmoidectomy with DLI.   Plan:  -Follow up with ID as directed. Scheduled for 7/2/25--virtual  -Follow up with Colorectal Surgery as directed. Scheduled for 7/15/25  -High dose Vitamin A x 30 days post-op (through 6/30)  -Continue benefiber packet daily per request  -BMP and CBC due 7/1/25  -Continue colostomy care as directed:  *Peristomal wound: With pouch changes - clean open wound to medial side of stoma with wound spray. Cut piece of Hydrofera Blue Classic to size of open area. Wet Hydrofera Blue (#469779) with saline and squeeze out excess, place in wound base, apply small bead of ostomy paste to the medial edge of hydrafera blue/Pt's skin to help hold in/seal hydrafera blue, then cover with additional ostomy ring to secure, apply wafer and caulk open creases around barrier ring, then attach pouch   *LLQ Colostomy pouching plan:   Pouching system: ostomy supplies pouches: [place] 57 FECAL ([id number]) ostomy supplies barrier: [place] 57mm SOFT CONVEX ([id number]). Accessories used: [facility] ostomy accessories: 2\" Cera Barrier Ring ([id number]), Powder ([id number]), and Cavilon no sting barrier film ([id number]). Apply stoma powder to any weepy open areas of skin around stoma.  Brush off all excess powder that does not stick. Apply Cavilon skin prep over powder - let dry before " applying new pouch. Pull abdomen flat when applying new pouch (makes the stoma a [place] shape instead of oval). (See photo from 6/23/25 in Media). 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 Follows Dr. Ramirez. Transplant Hepatology was consulted and advised against sirolimus. PTA prednisone 9mg was resumed after weaning off IV hydrocortisone   Plan:  -Continue Tacrolimus Oral suspension 2mg/2mL BID. Must be suspension given unable to tolerate capsule due to allergy to filler--to be filled by [facility]  -Continue prednisone 9mg daily for now. (5mg with 4 tablets of 1mg=9mg total)  -Continue PTA ursodiol 250 mg BID  -Follow up with transplant team as directed. Scheduled for 8/22/25  -Follow up with nephrology as directed. Scheduled for 7/23/25  -BMP and CBC due 7/1/25     Depression   Protein calorie deficit malnutrition    Comment: Chronic. Appetite fair. She is worried about some weight loss, however per [facility] her weights have been stable around 180# since April 2025. UMS 26/30  Plan:  -Monitor mood and behaviors  -Dietician to remain involved to assist with supplemental needs  -Continue chocolate glucerna BID with meals for now--dietician to adjust accordingly  -Monitor for worsening s/symptoms of concerns  -Monitor for changes in mobility, eating and sleeping patterns  -Continue zoloft 50mg daily.   -ACP while on TCU  -BMP and CBC due 7/1/25     Mild Obstructive Sleep Apnea   Nocturnal Hypoxemia   Pleural effusions  Shortness of breath  Acute respiratory failure with hypoxia  Comment: Chronic. Sleep study in 2018 showing mild RASHIDA with recommendation to start CPAP. It does not appear that there was further follow up and not using CPAP.   Plan:  -Monitor sleeping patterns  -Continue oxygen 1-2L via NC to keep sats >88% PRN  -Continue albuterol inhaler every 4 hours PRN.  [facility]  -Monitor respiratory status  -Follow up with sleep medicine when able  -BMP and CBC due 7/1/25     CKD3b w/ moderate proteinuria, at baseline  HTN  CVD/HLD  Atrial fibrillation with RVR  Comment: Chronic. Baseline Cr. 1.5. History of dialysis at time of liver transplant 23 years ago. Recurrent AKIs and immunosuppressive medication toxicity. Not on SGLT2i 2/2 recurrent UTI. Chart review w/ unclear heart failure history, unconfirmed with EF 60-65% on 3/10/25. BNP 1446 elevated from 1 month ago. Per cardiology on 6/17/25 Loop recording: Patient has been in persistent AF since 6/2/25. She has a history of pAF but this is the first persistent episode logged. She was hospitalized from 5/28-6/12 with septic shock secondary to diverticulitis complicated by abscess which required sigmoidectomy and colostomy. She states that during her hospitalization her telemetry frequently alerted for HRs in the 40's. Her loop recorder is set to alert for HRs < 40bpm... Per ED/recent hospitalization: She is tachycardic with a heart rate of 120. EKG was done upon arrival and this demonstrates atrial fibrillation with RVR with a rate of 138.  Here in the ED she has had rates ranging from 110s to 140s.  Blood pressure is acceptable at 133/100. Suspect her atrial fibrillation is due to the fact that she has been off of her rate control and antihypertensive medication/beta-blocker since her recent critical illness.  Recent echocardiogram dated 6/4/2025 shows normal EF of 60 to 65%. We did give the patient a dose of diltiazem here in the emergency department followed by a diltiazem drip.  We did establish IV access and we did draw blood for laboratory analysis.  CBC shows a normal white count of 10.5, hemoglobin is 8.5, up from 7.5 from 2 days ago, platelets are 632, this thrombocytosis appears to be new, CMP shows CKD with a creatinine of 1.68, consistent with previous, mild hyperkalemia with a potassium of 5.5, otherwise electrolytes  and LFTs appear to be within normal limits, magnesium within normal limits at 2.0, TSH within normal limits at 4.16, troponin is elevated at 54, this does appear to be somewhat above her baseline.  Repeat troponin is flat at 54.    Plan:  -Continue metoprolol succinate 25mg daily. HOLD if SBP<100 and/or HR<55  -Continue metoprolol tartrate TID PRN if HR>110. If used >2 time, advise to send to ED  -Monitor BP and HR  -Continue PTA evolocumab q2week.   -Continue PTA eztimibe 10mg every day  -Continue apixaban 2.5mg BID  -Follow up with cardiology as directed. Scheduled for 7/21/25  -BMP and CBC due 7/1/25                 Concern for transient ischemic attack  Comment: Acute. In the morning of 6/4, she stated that she had speech problems in the last couple of days since the surgery. She described that she has trouble getting her words out and that her speech did not feel as fluent as it was previously.  She denied any new numbness, tingling, headache or changes in vision. CT head was negative for intracranial bleed or other acute process and TTE with bubble study was also negative. It is possible that she may have had a TIA following surgery as anticoagulation had been held. MRI brain was negative for acute ischemic changes and her speech has returned to baseline.   Plan:  -Continue PTA Xarelto   -No need for neuro follow-up   -BMP and CBC due 7/1/25     T2DM  (A1c 6.8% 4/14/25)  Comment: Chronic. Last A1c 6.8% on 4/14/25.   Plan:  -Continue PTA linagliptin 5mg every day  -Continue PTA gabapentin 300mg at bedtime  -Blood Glucose monitoring back to previous schedule of BID. HOLD off on using freestyle phil 2 sensor while on TCU  -BMP and CBC due 7/1/25  -Obtain hgb A1c as directed. Due July 2025         Acute kidney injury on CKD3, resolved   Bilateral stent placement per urology 5/29  Acute tubular necrosis  Comment: Her baseline creatinine is 1.3-1.6. She had an DERREK in the setting of hypotension, shock, and  nephrotoxic meds which resolved after receiving IV fluids and improved PO after surgery.   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 Wound does not look infected. She now report having skin lesion to left anterior forearm. 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 Will defer to dermatology  Plan:  -Monitor for worsening s/symptoms of concerns  -Eucerin to face to moisturize PRN. May keep at bedside and self apply  -Follow up with dermatology --advised to contact when able     Acute on chronic anemia  coagulopathy due to cirrhosis and surgical blood loss    thrombocytopenia   Comment: Chronic. Per recent hospitalization-Did not find any signs of overt bleeding and her ostomy output and vitals were stable. Continued her ferous sulfate and folic acid inpatient. She received 1 unit of packed red blood cells on 6/7. Stable at the time of discharge. Now average around mid 7  Plan:  -Monitor bleeding risks  -Continue PTA ferrous sulfate daily  -Recommend transfusion if hgb <7, low threshold to send to ED for transfusions needs if indicated  -Continue folic acid daily  -BMP and CBC due 7/1/25     Hypothyroidism  Papillary thyroid carcinoma  Chronic. Recent TSH~1.72 on 4/4/25. S/p thyroidectomy 2010  Plan;  -Continue PTA levothyroxine 175mcg qD   -Monitor for worsening s/symptoms of concerns  -Repeat thyroid panel in July 2025     Chronic Shoulder Pain  Osteoarthritis   Lower back pain  Comment: Chronic. stable  Plan:  -Monitor pain complaints  -Continue diclofenac gel application to painful areas QID  -Continue Tylenol 1000mg BID   -Continue gabapentin 300mg at HS--only able to tolerate liquid given gelatin capsule allergy     Constipation   Nausea  GERD  Comment: Chronic. S/T polypharmacy.    Plan:  -Monitor BM patterns  -Continue TUMS 1000mg every 4 hours PRN  -Continue beneFiber qd    -Zofran PRN     Epistaxis  Comment: Acute on chronic. She reports occasional nose bleed at times. No episodes while on TCU.   Plan:  -Monitor bleeding risks  -Monitor for worsening s/symptoms of concerns  -Afrin BID PRN on TCU if warranted     Vaginal bleeding  Comment: Acute on chronic. Referred to OBGYN. Last menstrual period she reports has been 40 years ago or so. Known family history of endometrial cancer that metastasized to bowel per her reports. She reports her mother had extensive genetic testing for review in EPIC. Reports mother name: Anne Yap  3/9/23 for reference if indicated. Per OBGYN on 25:  If the endometrial stripe is greater than 4 mm or if it is less than 4 mm and she continues to have bleeding, she will need tissue evaluation with either office endometrial biopsy or hysteroscopy with D&C in the operating room. She does not believe she can tolerate an endometrial biopsy in the clinic, so we discussed doing this as an outpatient procedure.  Plan:  -Monitor for worsening s/symptoms of concerns  -Follow up with OBGYN as directed  -BMP and CBC due 25     Physical deconditioning  Generalized muscle weakness  History of falls  Comment: Acute on chronic. Known poor history PTA. [facility] given concerns. Per therapy-Patient requires MOdA for bed and UB needs. Maximum assistance for LB needs and toileting. Angel lift for all transfers given weakness. Increased anxiety and fear with transfers since TCU return.   Plan:  -Continue Physical therapy and Occupational therapy  -Recommend cognitive testing on site  -Highly recommend jail compliance post TCU     45 minutes spent on the date of the encounter doing chart review, history and exam, PMH, documentation and further activities as noted above. Collaboration with nursing staff on site, clinical managers, and  therapies along with time to change colostomy pouch at bedside were completed on site today.     Electronically signed by:  Dr. Manda Flor DNP, APRN, FNP-C, WCS-C, EDS-C     Provider reviewed records from facility, and interpreted most recent imaging/lab work, and vital signs.   Acute and chronic conditions managed by writer. Have been reviewed during today's exam        Sincerely,        Manda Flor, LIZ CNP    Electronically signed

## 2025-07-01 ENCOUNTER — TELEPHONE (OUTPATIENT)
Dept: WOUND CARE | Facility: CLINIC | Age: 75
End: 2025-07-01
Payer: MEDICARE

## 2025-07-01 NOTE — PROGRESS NOTES
[facility] GERIATRICS    Chief Complaint   Patient presents with    RECHECK     HPI:  Luz Thompson is a 75 year old  (1950), who is being seen today for an episodic care visit at: [facility]) [[id number]]. Today's concern is: The primary encounter diagnosis was Anxiety. Diagnoses of Colostomy present (H), Diverticulitis of colon, Chronic shoulder pain, unspecified laterality, Acute on chronic anemia, Type 2 diabetes mellitus with stage 3b chronic kidney disease, without long-term current use of insulin (H), Atrial fibrillation with RVR (H), Status post liver transplantation (H), Hypothyroidism, unspecified type, Bilateral low back pain without sciatica, unspecified chronicity, Stage 3b chronic kidney disease (H), SOB (shortness of breath), Physical deconditioning, Hypoxia, S/P laparoscopic-assisted sigmoidectomy, Intra-abdominal infection, Hx of sepsis, Hypotension, unspecified hypotension type, Generalized muscle weakness, Age-related osteoporosis without current pathological fracture, Chronic anemia, 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, S/P Mohs surgery for basal cell carcinoma, Hypertension goal BP (blood pressure) < 140/80, Nocturnal hypoxemia, Hyperlipidemia LDL goal <70, Chronic diastolic heart failure (H), RASHIDA (obstructive sleep apnea), Depression, unspecified depression type, Personal history of fall, Leukocytosis, unspecified type, Vaginal bleeding, Epistaxis, Slow transit constipation, Nausea, Gastroesophageal reflux disease with esophagitis, unspecified whether hemorrhage, Osteoarthritis of multiple joints, unspecified osteoarthritis type, Papillary thyroid carcinoma (H), Thrombocytopenia, Skin lesion, DERREK (acute kidney injury), Acute tubular necrosis, History of TIA (transient ischemic attack) and stroke, Hyperlipidemia, unspecified hyperlipidemia type, Acute respiratory failure with hypoxia (H), Protein-calorie malnutrition, unspecified  "severity, Sjogren's syndrome, with unspecified organ involvement, and Enlarged and hypertrophic nails were also pertinent to this visit.    Met with patient who denies any chest pain, palpitations, shortness of breath, [place], lightheadedness, dizziness, or cough currently at this time. She reports last night episode of HR going up to mid 130s. She was asymptomatic. This lasted for few minutes. By the time the nurse obtained the Blood pressure machine, it went back down to mid 90s.     She denies any abdominal discomfort. Denies N&V. Denies dysuria or frequency. No vaginal bleeding. Stools are soft and brown in colostomy. Appetite good. Sleeping well. No complaints of pain reported today.     She reports having ongoing early Am hypo-glycemia of levels dropping down to mid 60s. Asymptomatic.     Patient has care conference with therapy and management later today to discuss current status.     BP Readings from Last 3 Encounters:   07/02/25 (!) 161/90   06/30/25 138/82   06/26/25 121/63     Wt Readings from Last 5 Encounters:   07/02/25 86.3 kg (190 lb 3.2 oz)   06/30/25 83.4 kg (183 lb 12.8 oz)   06/26/25 83.4 kg (183 lb 12.8 oz)   06/22/25 82.1 kg (181 lb)   06/20/25 82.1 kg (181 lb)     Allergies, and PMH/PSH reviewed in EPIC today.  REVIEW OF SYSTEMS:  4 point ROS including Respiratory, CV, GI and , other than that noted in the HPI,  is negative    Objective:   BP (!) 161/90   Pulse 97   Temp 97.8  F (36.6  C)   Resp 18   Ht 1.702 m (5' 7\")   Wt 86.3 kg (190 lb 3.2 oz)   LMP 06/01/1988 (Approximate)   SpO2 96%   BMI 29.79 kg/m    GENERAL APPEARANCE:  Alert, in no distress, oriented, cooperative  ENT:  Mouth and posterior oropharynx normal, moist mucous membranes, [place]  EYES:  EOM, conjunctivae, lids, pupils and irises normal  NECK:  No adenopathy,masses or thyromegaly  RESP:  respiratory effort and palpation of chest normal, lungs clear to auscultation , no respiratory distress  CV:  regular rate and rhythm, " no murmur, rub, or gallop, peripheral edema 1+ in BLE, rate-normal  ABDOMEN:  normal bowel sounds, soft, nontender, no hepatosplenomegaly or other masses, no guarding or rebound  M/S:   Angel lift for all transfers. Maximum assistance for all ADLs, transfers and cares. Wheelchair bound. Nonambulatory  SKIN:  Inspection of skin and subcutaneous tissue baseline, wound healing well, no signs of infection    NEURO:   Cranial nerves 2-12 are normal tested and grossly at patient's baseline, no purposeful movement in upper and lower extremities  PSYCH:  oriented X 3, memory impaired , affect and mood normal    Most Recent 3 CBC's:  Recent Labs   Lab Test 06/30/25  1003 06/27/25  1013 06/23/25  0543   WBC 10.1 9.3 10.2   HGB 9.1* 8.8* 8.1*    101* 97   * 705* 638*     Most Recent 3 BMP's:  Recent Labs   Lab Test 06/30/25  1003 06/27/25  1013 06/25/25  1152 06/25/25  0831 06/25/25  0811 06/23/25  0552 06/23/25  0543    140  --   --   --   --  141   POTASSIUM 4.9 4.9  --   --  5.0   < > 4.6   CHLORIDE 105 107  --   --   --   --  107   CO2 21* 22  --   --   --   --  22   BUN 30.9* 32.0*  --   --   --   --  38.0*   CR 1.35* 1.42*  --   --   --   --  1.56*   ANIONGAP 12 11  --   --   --   --  12   KEILY 9.5 8.8  --   --   --   --  8.7*   * 91 104*   < >  --    < > 89    < > = values in this interval not displayed.     Most Recent 2 LFT's:  Recent Labs   Lab Test 06/30/25  1003 06/27/25  1013   AST 18 17   ALT 11 17   ALKPHOS 101 101   BILITOTAL 0.2 <0.2     Most Recent TSH and T4:  Recent Labs   Lab Test 06/22/25  2144 01/20/25  1635 11/14/24  1506   TSH 4.16   < > 3.31   T4  --   --  1.92*    < > = values in this interval not displayed.     Most Recent Hemoglobin A1c:  Recent Labs   Lab Test 05/29/25  1515   A1C 6.3*     Most Recent ESR & CRP:  Recent Labs   Lab Test 06/30/25  1003 04/28/25  1213 04/15/25  0613 06/13/23  1820 08/26/19  2331   SED  --   --  61*  --  78*   CRP  --   --   --   --  180.0*    CRPI 7.23*   < >  --    < >  --     < > = values in this interval not displayed.     Most Recent Anemia Panel:  Recent Labs   Lab Test 06/30/25  1003 04/14/25  1410 04/07/25  0945 09/04/24  1008 06/15/23  1028   WBC 10.1   < > 6.5   < > 7.6   HGB 9.1*   < > 9.1*   < > 12.1   HCT 30.9*   < > 30.0*   < > 38.4      < > 88   < > 92   *   < > 351   < > 345   IRON  --   --  36*   < >  --    IRONSAT  --   --  13*   < >  --    FEB  --   --  277   < >  --    LAURA  --   --  44   < >  --    B12  --   --   --   --  456    < > = values in this interval not displayed.       ASSESSMENT/PLAN:  Diverticulitis complicated by abscess status post open sigmoidectomy with end colostomy 5/30/25  History of Sepsis due to Klebsiella pneumoniae and Pseudomonas aeruginosa bacteremia 5/28, likely of intra-abdominal origin   Comment: Long multiple hospitalization course. She required a slow steroid taper because of blood pressure dysregulations. Transitioned from meropenem to ceftolozane-tazobactam due to resistance (psuedomonas coverage) and switched vancomycin to linezolid in the setting of acute kidney injury for coverage of previous vancomycin-resistent enterococci (E Facecium). From infectious disease's perspective, there were no good oral antibiotic options and so she completed a 7-day IV antibiotic course at the hospital. There was new cloudy PATY drain output on 6/6 and ongoing leukocytosis but no new localizing symptoms so we continued to monitor and found. She had been seen in colorectal surgery clinic the day prior (5/27) where it was decided that she should undergo open sigmoidectomy with DLI.   Plan:  -Follow up with ID as directed. Scheduled for 7/2/25--virtual  -Follow up with Colorectal Surgery as directed. Scheduled for 7/15/25  -Follow up with WOCN visit for new ostomy visit as directed. Scheduled for 7/14/25  -Continue benefiber packet daily per request  -CMP, CBC and CRP due 7/7/25  -Continue colostomy care as  "directed:  *Peristomal wound: With pouch changes - clean open wound to medial side of stoma with wound spray. Cut piece of Hydrofera Blue Classic to size of open area. Wet Hydrofera Blue (#[id number]) with saline and squeeze out excess, place in wound base, apply small bead of ostomy paste to the medial edge of hydrafera blue/Pt's skin to help hold in/seal hydrafera blue, then cover with additional ostomy ring to secure, apply wafer and caulk open creases around barrier ring, then attach pouch   *LLQ Colostomy pouching plan:   Pouching system: ostomy supplies pouches: [place] 57 FECAL ([id number]) ostomy supplies barrier: [place] 57mm SOFT CONVEX ([id number]). Accessories used: [facility] ostomy accessories: 2\" Cera Barrier Ring ([id number]), Powder ([id number]), and Cavilon no sting barrier film ([id number]). Apply stoma powder to any weepy open areas of skin around stoma.  Brush off all excess powder that does not stick. Apply Cavilon skin prep over powder - let dry before applying new pouch. Pull abdomen flat when applying new pouch (makes the stoma a [place] shape instead of oval). (See photo from 6/23/25 in Media). 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 Follows Dr. Ramirez. Transplant Hepatology was consulted and advised against sirolimus. PTA prednisone 9mg was resumed after weaning off IV hydrocortisone   Plan:  -Continue Tacrolimus Oral suspension 2mg/2mL BID. Must be suspension given unable to tolerate capsule due to allergy to filler--to be filled by [facility]  -Continue prednisone 9mg daily for now. (5mg with 4 tablets of 1mg=9mg total)  -Continue PTA ursodiol 250 mg BID  -Follow up with transplant team as directed. Scheduled for 8/22/25  -Follow up with nephrology as directed. Scheduled for 7/23/25  -Complete weekly tacrolimus trough level due on Mondays per transplant team. Next due " 7/7/25  -CMP, CBC and CRP due 7/7/25     Depression   Protein calorie deficit malnutrition    Anxiety  Comment: Chronic. Appetite fair. Her weights have been stable around 180# since April 2025, however now noted increase in weight up to 190# on 7/1/25. SLUMS 26/30. Increased anxiety around transfers per therapy.   Plan:  -Monitor mood and behaviors  -Dietician to remain involved to assist with supplemental needs  -Continue chocolate glucerna BID with meals for now--dietician to adjust accordingly  -Monitor for worsening s/symptoms of concerns  -Monitor for changes in mobility, eating and sleeping patterns  -Hydroxyzine PRN x 14 days for anxiety concerns  -Continue zoloft 50mg daily.   -ACP while on TCU  -CMP, CBC and CRP due 7/7/25     Mild Obstructive Sleep Apnea   Nocturnal Hypoxemia   Shortness of breath  Acute respiratory failure with hypoxia  Comment: Chronic. Sleep study in 2018 showing mild RASHIDA with recommendation to start CPAP. It does not appear that there was further follow up and not using CPAP.   Plan:  -Monitor sleeping patterns  -Continue oxygen 1-2L via NC to keep sats >88% PRN  -Continue albuterol inhaler every 4 hours PRN. May HUNTER  -Monitor respiratory status  -Follow up with sleep medicine when able  -CMP, CBC and CRP due 7/7/25     CKD3b w/ moderate proteinuria, at baseline  HTN  CVD/HLD  Atrial fibrillation with RVR  Comment: Chronic. Baseline Cr. 1.5. History of dialysis at time of liver transplant 23 years ago. Recurrent AKIs and immunosuppressive medication toxicity. Not on SGLT2i 2/2 recurrent UTI. Chart review w/ unclear heart failure history, unconfirmed with EF 60-65% on 3/10/25. BNP 1446 elevated from 1 month ago. Per cardiology on 6/17/25 Loop recording: Patient has been in persistent AF since 6/2/25. She has a history of pAF but this is the first persistent episode logged. She was hospitalized from 5/28-6/12 with septic shock secondary to diverticulitis complicated by abscess which  required sigmoidectomy and colostomy. She states that during her hospitalization her telemetry frequently alerted for HRs in the 40's. Her loop recorder is set to alert for HRs < 40bpm... Per ED/recent hospitalization: She is tachycardic with a heart rate of 120. EKG was done upon arrival and this demonstrates atrial fibrillation with RVR with a rate of 138.  Here in the ED she has had rates ranging from 110s to 140s.  Blood pressure is acceptable at 133/100. Suspect her atrial fibrillation is due to the fact that she has been off of her rate control and antihypertensive medication/beta-blocker since her recent critical illness.  Recent echocardiogram dated 6/4/2025 shows normal EF of 60 to 65%. We did give the patient a dose of diltiazem here in the emergency department followed by a diltiazem drip.  We did establish IV access and we did draw blood for laboratory analysis.  CBC shows a normal white count of 10.5, hemoglobin is 8.5, up from 7.5 from 2 days ago, platelets are 632, this thrombocytosis appears to be new, CMP shows CKD with a creatinine of 1.68, consistent with previous, mild hyperkalemia with a potassium of 5.5, otherwise electrolytes and LFTs appear to be within normal limits, magnesium within normal limits at 2.0, TSH within normal limits at 4.16, troponin is elevated at 54, this does appear to be somewhat above her baseline.  Repeat troponin is flat at 54.    Plan:  -Continue metoprolol succinate 25mg daily. HOLD if SBP<100 and/or HR<55  -Continue metoprolol tartrate TID PRN if HR>110. If used >2 time, advise to send to ED  -Monitor BP and HR  -Continue PTA evolocumab q2week.   -Continue PTA eztimibe 10mg every day  -Continue apixaban 2.5mg BID  -Follow up with cardiology as directed. Scheduled for 7/21/25  -CMP, CBC and CRP due 7/7/25             Concern for transient ischemic attack  Comment: Acute. In the morning of 6/4, she stated that she had speech problems in the last couple of days since the  surgery. She described that she has trouble getting her words out and that her speech did not feel as fluent as it was previously.  She denied any new numbness, tingling, headache or changes in vision. CT head was negative for intracranial bleed or other acute process and TTE with bubble study was also negative. It is possible that she may have had a TIA following surgery as anticoagulation had been held. MRI brain was negative for acute ischemic changes and her speech has returned to baseline.   Plan:  -Continue PTA Xarelto   -No need for neuro follow-up   -CMP, CBC and CRP due 7/7/25     T2DM  (A1c 6.8% 4/14/25)  Enlarged and hypertrophic nails  Comment: Chronic. Last A1c 6.8% on 4/14/25. She reports having ongoing early AM hypo-glycemia of levels dropping down to mid 60s. Asymptomatic  Plan:  -Continue PTA linagliptin 5mg every day  -All toenails were trimmed manually with trimmers at bedside today without concerns.   -Continue PTA gabapentin 300mg at bedtime  -Dietician involved to assist with offering and obtaining bedtime snack to prevent early AM hypoglycemia.   -Blood Glucose monitoring BID. HOLD off on using freestyle phil 2 sensor while on TCU  -CMP, CBC and CRP due 7/7/25  -Obtain hgb A1c as directed due 7/7/25    Hemoglobin A1C   Date Value Ref Range Status   05/29/2025 6.3 (H) <5.7 % Final     Comment:     Normal <5.7%   Prediabetes 5.7-6.4%    Diabetes 6.5% or higher     Note: Adopted from ADA consensus guidelines.   10/16/2018 6.4 (H) 0 - 5.6 % Final     Comment:     Normal <5.7% Prediabetes 5.7-6.4%  Diabetes 6.5% or higher - adopted from ADA   consensus guidelines.            Acute kidney injury on CKD3, resolved   Bilateral stent placement per urology 5/29  Acute tubular necrosis  Comment: Her baseline creatinine is 1.3-1.6. She had an DERREK in the setting of hypotension, shock, and nephrotoxic meds which resolved after receiving IV fluids and improved PO after surgery.   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 Wound does not look infected. She now report having skin lesion to left anterior forearm. 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 Will defer to dermatology  Plan:  -Monitor for worsening s/symptoms of concerns  -Eucerin to face to moisturize PRN. May keep at bedside and self apply  -Follow up with dermatology --Scheduled for 7/8/25     Acute on chronic anemia  coagulopathy due to cirrhosis and surgical blood loss    thrombocytopenia   Comment: Chronic. Per recent hospitalization-Did not find any signs of overt bleeding and her ostomy output and vitals were stable. Continued her ferous sulfate and folic acid inpatient. She received 1 unit of packed red blood cells on 6/7. Stable at the time of discharge. Now average around 9  Plan:  -Monitor bleeding risks  -Continue PTA ferrous sulfate daily  -Recommend transfusion if hgb <7, low threshold to send to ED for transfusions needs if indicated  -Continue folic acid daily  -CMP, CBC and CRP due 7/7/25     Hypothyroidism  Papillary thyroid carcinoma  Chronic. Recent TSH~4.16 on 6/22/25. S/p thyroidectomy 2010  Plan;  -Continue PTA levothyroxine 175mcg qD   -Monitor for worsening s/symptoms of concerns     Chronic Shoulder Pain  Osteoarthritis   Lower back pain  Comment: Chronic. stable  Plan:  -Monitor pain complaints  -Continue diclofenac gel application to painful areas QID  -Continue Tylenol 1000mg BID   -Continue gabapentin 300mg at HS--only able to tolerate liquid given gelatin capsule allergy     Constipation   Nausea  GERD  Comment: Chronic. S/T polypharmacy.   Plan:  -Monitor BM patterns  -Continue TUMS 1000mg every 4 hours PRN  -Continue beneFiber qd    -Zofran PRN     Epistaxis  Comment: Acute on chronic. She reports  occasional nose bleed at times. No episodes while on TCU.   Plan:  -Monitor bleeding risks  -Monitor for worsening s/symptoms of concerns  -Afrin BID PRN on TCU if warranted     Vaginal bleeding  Comment: Acute on chronic. Referred to OBGYN. Last menstrual period she reports has been 40 years ago or so. Known family history of endometrial cancer that metastasized to bowel per her reports. She reports her mother had extensive genetic testing for review in EPIC. Reports mother name: Anne Yap  3/9/23 for reference if indicated. Per OBGYN on 25:  If the endometrial stripe is greater than 4 mm or if it is less than 4 mm and she continues to have bleeding, she will need tissue evaluation with either office endometrial biopsy or hysteroscopy with D&C in the operating room. She does not believe she can tolerate an endometrial biopsy in the clinic, so we discussed doing this as an outpatient procedure.  Plan:  -Monitor for worsening s/symptoms of concerns  -Follow up with OBGYN as directed  -CMP, CBC and CRP due 25     Physical deconditioning  Generalized muscle weakness  History of falls  Comment: Acute on chronic. Known poor history PTA. [facility] given concerns. Per therapy-Patient requires MOdA for bed and UB needs. Maximum assistance for LB needs and toileting. Angel lift for all transfers given weakness. Increased anxiety and fear with transfers since TCU return.   Plan:  -Continue Physical therapy and Occupational therapy  -Recommend cognitive testing on site  -Highly recommend senior living compliance post TCU    45 minutes spent on the date of the encounter doing chart review, history and exam, PMH, documentation and further activities as noted above. Collaboration with nursing staff on site, clinical managers, and therapies were completed on site today. \     Electronically signed by:  Dr. Manda Flor DNP, APRN, FNP-C, WCS-C, EDS-C     Provider reviewed records from  facility, and interpreted most recent imaging/lab work, and vital signs.   Acute and chronic conditions managed by writer. Have been reviewed during today's exam

## 2025-07-01 NOTE — TELEPHONE ENCOUNTER
Spoke to Toya, pt's nurse, and asked that a tacro level is drawn weekly x 2. Toya will coordinate for this and next week. Also explained a 12 hour trough.

## 2025-07-01 NOTE — TELEPHONE ENCOUNTER
Patient confirmed scheduled appointment:  Date: 7/14/25  Time: 730 am   Visit type: New Ostomy Care   Provider: Lorin Gale RN   Location: [facility] [place]  Testing/imaging: n/a  Additional notes: ostomy care

## 2025-07-02 ENCOUNTER — LAB REQUISITION (OUTPATIENT)
Dept: LAB | Facility: CLINIC | Age: 75
End: 2025-07-02
Payer: MEDICARE

## 2025-07-02 ENCOUNTER — TRANSITIONAL CARE UNIT VISIT (OUTPATIENT)
Dept: GERIATRICS | Facility: CLINIC | Age: 75
End: 2025-07-02
Payer: MEDICARE

## 2025-07-02 ENCOUNTER — VIRTUAL VISIT (OUTPATIENT)
Dept: INFECTIOUS DISEASES | Facility: CLINIC | Age: 75
End: 2025-07-02
Attending: STUDENT IN AN ORGANIZED HEALTH CARE EDUCATION/TRAINING PROGRAM
Payer: MEDICARE

## 2025-07-02 VITALS
DIASTOLIC BLOOD PRESSURE: 90 MMHG | RESPIRATION RATE: 18 BRPM | OXYGEN SATURATION: 96 % | WEIGHT: 190.2 LBS | SYSTOLIC BLOOD PRESSURE: 161 MMHG | HEART RATE: 97 BPM | HEIGHT: 67 IN | BODY MASS INDEX: 29.85 KG/M2 | TEMPERATURE: 97.8 F

## 2025-07-02 VITALS — BODY MASS INDEX: 28.41 KG/M2 | HEIGHT: 67 IN | WEIGHT: 181 LBS

## 2025-07-02 DIAGNOSIS — Z85.828 S/P MOHS SURGERY FOR BASAL CELL CARCINOMA: ICD-10-CM

## 2025-07-02 DIAGNOSIS — B99.9 INTRA-ABDOMINAL INFECTION: ICD-10-CM

## 2025-07-02 DIAGNOSIS — R04.0 EPISTAXIS: ICD-10-CM

## 2025-07-02 DIAGNOSIS — E78.5 HYPERLIPIDEMIA LDL GOAL <70: ICD-10-CM

## 2025-07-02 DIAGNOSIS — G47.33 OSA (OBSTRUCTIVE SLEEP APNEA): ICD-10-CM

## 2025-07-02 DIAGNOSIS — Q84.5 ENLARGED AND HYPERTROPHIC NAILS: ICD-10-CM

## 2025-07-02 DIAGNOSIS — J96.01 ACUTE RESPIRATORY FAILURE WITH HYPOXIA (H): ICD-10-CM

## 2025-07-02 DIAGNOSIS — N93.9 VAGINAL BLEEDING: ICD-10-CM

## 2025-07-02 DIAGNOSIS — K57.20 DIVERTICULITIS OF LARGE INTESTINE WITH PERFORATION AND ABSCESS WITHOUT BLEEDING: Primary | ICD-10-CM

## 2025-07-02 DIAGNOSIS — K59.01 SLOW TRANSIT CONSTIPATION: ICD-10-CM

## 2025-07-02 DIAGNOSIS — E46 PROTEIN-CALORIE MALNUTRITION, UNSPECIFIED SEVERITY: ICD-10-CM

## 2025-07-02 DIAGNOSIS — I95.9 HYPOTENSION, UNSPECIFIED HYPOTENSION TYPE: ICD-10-CM

## 2025-07-02 DIAGNOSIS — M62.81 GENERALIZED MUSCLE WEAKNESS: ICD-10-CM

## 2025-07-02 DIAGNOSIS — M25.519 CHRONIC SHOULDER PAIN, UNSPECIFIED LATERALITY: ICD-10-CM

## 2025-07-02 DIAGNOSIS — R09.02 HYPOXIA: ICD-10-CM

## 2025-07-02 DIAGNOSIS — N18.32 STAGE 3B CHRONIC KIDNEY DISEASE (H): ICD-10-CM

## 2025-07-02 DIAGNOSIS — N18.30 CHRONIC KIDNEY DISEASE, STAGE 3 UNSPECIFIED (H): ICD-10-CM

## 2025-07-02 DIAGNOSIS — E11.22 TYPE 2 DIABETES MELLITUS WITH STAGE 3B CHRONIC KIDNEY DISEASE, WITHOUT LONG-TERM CURRENT USE OF INSULIN (H): ICD-10-CM

## 2025-07-02 DIAGNOSIS — Z94.4 H/O LIVER TRANSPLANT (H): ICD-10-CM

## 2025-07-02 DIAGNOSIS — F32.A DEPRESSION, UNSPECIFIED DEPRESSION TYPE: ICD-10-CM

## 2025-07-02 DIAGNOSIS — I48.91 ATRIAL FIBRILLATION WITH RVR (H): ICD-10-CM

## 2025-07-02 DIAGNOSIS — K57.32 DIVERTICULITIS OF COLON: ICD-10-CM

## 2025-07-02 DIAGNOSIS — Z90.49 S/P LAPAROSCOPIC-ASSISTED SIGMOIDECTOMY: ICD-10-CM

## 2025-07-02 DIAGNOSIS — D69.6 THROMBOCYTOPENIA: ICD-10-CM

## 2025-07-02 DIAGNOSIS — M15.9 OSTEOARTHRITIS OF MULTIPLE JOINTS, UNSPECIFIED OSTEOARTHRITIS TYPE: ICD-10-CM

## 2025-07-02 DIAGNOSIS — D64.9 CHRONIC ANEMIA: ICD-10-CM

## 2025-07-02 DIAGNOSIS — Z91.81 PERSONAL HISTORY OF FALL: ICD-10-CM

## 2025-07-02 DIAGNOSIS — N17.9 AKI (ACUTE KIDNEY INJURY): ICD-10-CM

## 2025-07-02 DIAGNOSIS — I10 HYPERTENSION GOAL BP (BLOOD PRESSURE) < 140/80: ICD-10-CM

## 2025-07-02 DIAGNOSIS — D64.9 ACUTE ON CHRONIC ANEMIA: ICD-10-CM

## 2025-07-02 DIAGNOSIS — F41.9 ANXIETY: Primary | ICD-10-CM

## 2025-07-02 DIAGNOSIS — G47.34 NOCTURNAL HYPOXEMIA: ICD-10-CM

## 2025-07-02 DIAGNOSIS — C44.329 SQUAMOUS CELL CANCER OF SKIN OF LEFT CHEEK: ICD-10-CM

## 2025-07-02 DIAGNOSIS — D64.9 ANEMIA, UNSPECIFIED: ICD-10-CM

## 2025-07-02 DIAGNOSIS — G89.29 CHRONIC SHOULDER PAIN, UNSPECIFIED LATERALITY: ICD-10-CM

## 2025-07-02 DIAGNOSIS — E11.9 TYPE 2 DIABETES MELLITUS WITHOUT COMPLICATIONS (H): ICD-10-CM

## 2025-07-02 DIAGNOSIS — E78.5 HYPERLIPIDEMIA, UNSPECIFIED HYPERLIPIDEMIA TYPE: ICD-10-CM

## 2025-07-02 DIAGNOSIS — R06.02 SOB (SHORTNESS OF BREATH): ICD-10-CM

## 2025-07-02 DIAGNOSIS — Z93.3 COLOSTOMY PRESENT (H): ICD-10-CM

## 2025-07-02 DIAGNOSIS — C73 PAPILLARY THYROID CARCINOMA (H): ICD-10-CM

## 2025-07-02 DIAGNOSIS — Z48.23 ENCOUNTER FOR AFTERCARE FOLLOWING LIVER TRANSPLANT (H): ICD-10-CM

## 2025-07-02 DIAGNOSIS — R50.9 FUO (FEVER OF UNKNOWN ORIGIN): ICD-10-CM

## 2025-07-02 DIAGNOSIS — E03.9 HYPOTHYROIDISM, UNSPECIFIED TYPE: ICD-10-CM

## 2025-07-02 DIAGNOSIS — K21.00 GASTROESOPHAGEAL REFLUX DISEASE WITH ESOPHAGITIS, UNSPECIFIED WHETHER HEMORRHAGE: ICD-10-CM

## 2025-07-02 DIAGNOSIS — Z86.19 HX OF SEPSIS: ICD-10-CM

## 2025-07-02 DIAGNOSIS — R53.81 PHYSICAL DECONDITIONING: ICD-10-CM

## 2025-07-02 DIAGNOSIS — Z86.73 HISTORY OF TIA (TRANSIENT ISCHEMIC ATTACK) AND STROKE: ICD-10-CM

## 2025-07-02 DIAGNOSIS — D72.829 LEUKOCYTOSIS, UNSPECIFIED TYPE: ICD-10-CM

## 2025-07-02 DIAGNOSIS — M81.0 AGE-RELATED OSTEOPOROSIS WITHOUT CURRENT PATHOLOGICAL FRACTURE: ICD-10-CM

## 2025-07-02 DIAGNOSIS — Z94.4 STATUS POST LIVER TRANSPLANTATION (H): ICD-10-CM

## 2025-07-02 DIAGNOSIS — I10 ESSENTIAL (PRIMARY) HYPERTENSION: ICD-10-CM

## 2025-07-02 DIAGNOSIS — N17.0 ACUTE TUBULAR NECROSIS: ICD-10-CM

## 2025-07-02 DIAGNOSIS — M35.00 SJOGREN'S SYNDROME, WITH UNSPECIFIED ORGAN INVOLVEMENT: ICD-10-CM

## 2025-07-02 DIAGNOSIS — Z98.890 S/P MOHS SURGERY FOR BASAL CELL CARCINOMA: ICD-10-CM

## 2025-07-02 DIAGNOSIS — R11.0 NAUSEA: ICD-10-CM

## 2025-07-02 DIAGNOSIS — I50.32 CHRONIC DIASTOLIC HEART FAILURE (H): ICD-10-CM

## 2025-07-02 DIAGNOSIS — M54.50 BILATERAL LOW BACK PAIN WITHOUT SCIATICA, UNSPECIFIED CHRONICITY: ICD-10-CM

## 2025-07-02 DIAGNOSIS — N18.32 TYPE 2 DIABETES MELLITUS WITH STAGE 3B CHRONIC KIDNEY DISEASE, WITHOUT LONG-TERM CURRENT USE OF INSULIN (H): ICD-10-CM

## 2025-07-02 DIAGNOSIS — L98.9 SKIN LESION: ICD-10-CM

## 2025-07-02 PROCEDURE — 99310 SBSQ NF CARE HIGH MDM 45: CPT | Mod: 25 | Performed by: NURSE PRACTITIONER

## 2025-07-02 PROCEDURE — 98015 SYNCH AUDIO-ONLY EST HIGH 40: CPT | Mod: 24 | Performed by: STUDENT IN AN ORGANIZED HEALTH CARE EDUCATION/TRAINING PROGRAM

## 2025-07-02 PROCEDURE — 11719 TRIM NAIL(S) ANY NUMBER: CPT | Mod: T1 | Performed by: NURSE PRACTITIONER

## 2025-07-02 PROCEDURE — 1126F AMNT PAIN NOTED NONE PRSNT: CPT | Performed by: STUDENT IN AN ORGANIZED HEALTH CARE EDUCATION/TRAINING PROGRAM

## 2025-07-02 ASSESSMENT — PAIN SCALES - GENERAL: PAINLEVEL_OUTOF10: NO PAIN (0)

## 2025-07-02 NOTE — LETTER
7/2/2025      Luz Thompson  [address]  [facility] [address]        [facility] GERIATRICS    Chief Complaint   Patient presents with     RECHECK     HPI:  Luz Thompson is a 75 year old  (1950), who is being seen today for an episodic care visit at: [facility] (TCU)([facility]) [[id number]]. Today's concern is: The primary encounter diagnosis was Anxiety. Diagnoses of Colostomy present (H), Diverticulitis of colon, Chronic shoulder pain, unspecified laterality, Acute on chronic anemia, Type 2 diabetes mellitus with stage 3b chronic kidney disease, without long-term current use of insulin (H), Atrial fibrillation with RVR (H), Status post liver transplantation (H), Hypothyroidism, unspecified type, Bilateral low back pain without sciatica, unspecified chronicity, Stage 3b chronic kidney disease (H), SOB (shortness of breath), Physical deconditioning, Hypoxia, S/P laparoscopic-assisted sigmoidectomy, Intra-abdominal infection, Hx of sepsis, Hypotension, unspecified hypotension type, Generalized muscle weakness, Age-related osteoporosis without current pathological fracture, Chronic anemia, 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, S/P Mohs surgery for basal cell carcinoma, Hypertension goal BP (blood pressure) < 140/80, Nocturnal hypoxemia, Hyperlipidemia LDL goal <70, Chronic diastolic heart failure (H), RASHIDA (obstructive sleep apnea), Depression, unspecified depression type, Personal history of fall, Leukocytosis, unspecified type, Vaginal bleeding, Epistaxis, Slow transit constipation, Nausea, Gastroesophageal reflux disease with esophagitis, unspecified whether hemorrhage, Osteoarthritis of multiple joints, unspecified osteoarthritis type, Papillary thyroid carcinoma (H), Thrombocytopenia, Skin lesion, DERREK (acute kidney injury), Acute tubular necrosis, History of TIA (transient ischemic attack) and stroke, Hyperlipidemia, unspecified hyperlipidemia type,  "Acute respiratory failure with hypoxia (H), Protein-calorie malnutrition, unspecified severity, Sjogren's syndrome, with unspecified organ involvement, and Enlarged and hypertrophic nails were also pertinent to this visit.    Met with patient who denies any chest pain, palpitations, shortness of breath, [place], lightheadedness, dizziness, or cough currently at this time. She reports last night episode of HR going up to mid 130s. She was asymptomatic. This lasted for few minutes. By the time the nurse obtained the Blood pressure machine, it went back down to mid 90s.     She denies any abdominal discomfort. Denies N&V. Denies dysuria or frequency. No vaginal bleeding. Stools are soft and brown in colostomy. Appetite good. Sleeping well. No complaints of pain reported today.     She reports having ongoing early Am hypo-glycemia of levels dropping down to mid 60s. Asymptomatic.     Patient has care conference with therapy and management later today to discuss current status.     BP Readings from Last 3 Encounters:   07/02/25 (!) 161/90   06/30/25 138/82   06/26/25 121/63     Wt Readings from Last 5 Encounters:   07/02/25 86.3 kg (190 lb 3.2 oz)   06/30/25 83.4 kg (183 lb 12.8 oz)   06/26/25 83.4 kg (183 lb 12.8 oz)   06/22/25 82.1 kg (181 lb)   06/20/25 82.1 kg (181 lb)     Allergies, and PMH/PSH reviewed in EPIC today.  REVIEW OF SYSTEMS:  4 point ROS including Respiratory, CV, GI and , other than that noted in the HPI,  is negative    Objective:   BP (!) 161/90   Pulse 97   Temp 97.8  F (36.6  C)   Resp 18   Ht 1.702 m (5' 7\")   Wt 86.3 kg (190 lb 3.2 oz)   LMP 06/01/1988 (Approximate)   SpO2 96%   BMI 29.79 kg/m    GENERAL APPEARANCE:  Alert, in no distress, oriented, cooperative  ENT:  Mouth and posterior oropharynx normal, moist mucous membranes, [place]  EYES:  EOM, conjunctivae, lids, pupils and irises normal  NECK:  No adenopathy,masses or thyromegaly  RESP:  respiratory effort and palpation of chest normal, " lungs clear to auscultation , no respiratory distress  CV:  regular rate and rhythm, no murmur, rub, or gallop, peripheral edema 1+ in BLE, rate-normal  ABDOMEN:  normal bowel sounds, soft, nontender, no hepatosplenomegaly or other masses, no guarding or rebound  M/S:   Angel lift for all transfers. Maximum assistance for all ADLs, transfers and cares. Wheelchair bound. Nonambulatory  SKIN:  Inspection of skin and subcutaneous tissue baseline, wound healing well, no signs of infection    NEURO:   Cranial nerves 2-12 are normal tested and grossly at patient's baseline, no purposeful movement in upper and lower extremities  PSYCH:  oriented X 3, memory impaired , affect and mood normal    Most Recent 3 CBC's:  Recent Labs   Lab Test 06/30/25  1003 06/27/25  1013 06/23/25  0543   WBC 10.1 9.3 10.2   HGB 9.1* 8.8* 8.1*    101* 97   * 705* 638*     Most Recent 3 BMP's:  Recent Labs   Lab Test 06/30/25  1003 06/27/25  1013 06/25/25  1152 06/25/25  0831 06/25/25  0811 06/23/25  0552 06/23/25  0543    140  --   --   --   --  141   POTASSIUM 4.9 4.9  --   --  5.0   < > 4.6   CHLORIDE 105 107  --   --   --   --  107   CO2 21* 22  --   --   --   --  22   BUN 30.9* 32.0*  --   --   --   --  38.0*   CR 1.35* 1.42*  --   --   --   --  1.56*   ANIONGAP 12 11  --   --   --   --  12   KEILY 9.5 8.8  --   --   --   --  8.7*   * 91 104*   < >  --    < > 89    < > = values in this interval not displayed.     Most Recent 2 LFT's:  Recent Labs   Lab Test 06/30/25  1003 06/27/25  1013   AST 18 17   ALT 11 17   ALKPHOS 101 101   BILITOTAL 0.2 <0.2     Most Recent TSH and T4:  Recent Labs   Lab Test 06/22/25  2144 01/20/25  1635 11/14/24  1506   TSH 4.16   < > 3.31   T4  --   --  1.92*    < > = values in this interval not displayed.     Most Recent Hemoglobin A1c:  Recent Labs   Lab Test 05/29/25  1515   A1C 6.3*     Most Recent ESR & CRP:  Recent Labs   Lab Test 06/30/25  1003 04/28/25  1213 04/15/25  0613  06/13/23  1820 08/26/19  2331   SED  --   --  61*  --  78*   CRP  --   --   --   --  180.0*   CRPI 7.23*   < >  --    < >  --     < > = values in this interval not displayed.     Most Recent Anemia Panel:  Recent Labs   Lab Test 06/30/25  1003 04/14/25  1410 04/07/25  0945 09/04/24  1008 06/15/23  1028   WBC 10.1   < > 6.5   < > 7.6   HGB 9.1*   < > 9.1*   < > 12.1   HCT 30.9*   < > 30.0*   < > 38.4      < > 88   < > 92   *   < > 351   < > 345   IRON  --   --  36*   < >  --    IRONSAT  --   --  13*   < >  --    FEB  --   --  277   < >  --    LAURA  --   --  44   < >  --    B12  --   --   --   --  456    < > = values in this interval not displayed.       ASSESSMENT/PLAN:  Diverticulitis complicated by abscess status post open sigmoidectomy with end colostomy 5/30/25  History of Sepsis due to Klebsiella pneumoniae and Pseudomonas aeruginosa bacteremia 5/28, likely of intra-abdominal origin   Comment: Long multiple hospitalization course. She required a slow steroid taper because of blood pressure dysregulations. Transitioned from meropenem to ceftolozane-tazobactam due to resistance (psuedomonas coverage) and switched vancomycin to linezolid in the setting of acute kidney injury for coverage of previous vancomycin-resistent enterococci (E Facecium). From infectious disease's perspective, there were no good oral antibiotic options and so she completed a 7-day IV antibiotic course at the hospital. There was new cloudy PATY drain output on 6/6 and ongoing leukocytosis but no new localizing symptoms so we continued to monitor and found. She had been seen in colorectal surgery clinic the day prior (5/27) where it was decided that she should undergo open sigmoidectomy with DLI.   Plan:  -Follow up with ID as directed. Scheduled for 7/2/25--virtual  -Follow up with Colorectal Surgery as directed. Scheduled for 7/15/25  -Follow up with WOCN visit for new ostomy visit as directed. Scheduled for 7/14/25  -Continue  "benefiber packet daily per request  -CMP, CBC and CRP due 7/7/25  -Continue colostomy care as directed:  *Peristomal wound: With pouch changes - clean open wound to medial side of stoma with wound spray. Cut piece of Hydrofera Blue Classic to size of open area. Wet Hydrofera Blue (#[id number]) with saline and squeeze out excess, place in wound base, apply small bead of ostomy paste to the medial edge of hydrafera blue/Pt's skin to help hold in/seal hydrafera blue, then cover with additional ostomy ring to secure, apply wafer and caulk open creases around barrier ring, then attach pouch   *LLQ Colostomy pouching plan:   Pouching system: ostomy supplies pouches: Beccaria 57 FECAL ([id number]) ostomy supplies barrier: Beccaria 57mm SOFT CONVEX ([id number]). Accessories used: [facility] ostomy accessories: 2\" Cera Barrier Ring ([id number]), Powder ([id number]), and Cavilon no sting barrier film ([id number]). Apply stoma powder to any weepy open areas of skin around stoma.  Brush off all excess powder that does not stick. Apply Cavilon skin prep over powder - let dry before applying new pouch. Pull abdomen flat when applying new pouch (makes the stoma a [place] shape instead of oval). (See photo from 6/23/25 in Media). 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 Follows Dr. Ramirez. Transplant Hepatology was consulted and advised against sirolimus. PTA prednisone 9mg was resumed after weaning off IV hydrocortisone   Plan:  -Continue Tacrolimus Oral suspension 2mg/2mL BID. Must be suspension given unable to tolerate capsule due to allergy to filler--to be filled by [facility]  -Continue prednisone 9mg daily for now. (5mg with 4 tablets of 1mg=9mg total)  -Continue PTA ursodiol 250 mg BID  -Follow up with transplant team as directed. Scheduled for 8/22/25  -Follow up with nephrology as directed. Scheduled for " 7/23/25  -Complete weekly tacrolimus trough level due on Mondays per transplant team. Next due 7/7/25  -CMP, CBC and CRP due 7/7/25     Depression   Protein calorie deficit malnutrition    Anxiety  Comment: Chronic. Appetite fair. Her weights have been stable around 180# since April 2025, however now noted increase in weight up to 190# on 7/1/25. SLUMS 26/30. Increased anxiety around transfers per therapy.   Plan:  -Monitor mood and behaviors  -Dietician to remain involved to assist with supplemental needs  -Continue chocolate glucerna BID with meals for now--dietician to adjust accordingly  -Monitor for worsening s/symptoms of concerns  -Monitor for changes in mobility, eating and sleeping patterns  -Hydroxyzine PRN x 14 days for anxiety concerns  -Continue zoloft 50mg daily.   -ACP while on TCU  -CMP, CBC and CRP due 7/7/25     Mild Obstructive Sleep Apnea   Nocturnal Hypoxemia   Shortness of breath  Acute respiratory failure with hypoxia  Comment: Chronic. Sleep study in 2018 showing mild RASHIDA with recommendation to start CPAP. It does not appear that there was further follow up and not using CPAP.   Plan:  -Monitor sleeping patterns  -Continue oxygen 1-2L via NC to keep sats >88% PRN  -Continue albuterol inhaler every 4 hours PRN. May HUNTER  -Monitor respiratory status  -Follow up with sleep medicine when able  -CMP, CBC and CRP due 7/7/25     CKD3b w/ moderate proteinuria, at baseline  HTN  CVD/HLD  Atrial fibrillation with RVR  Comment: Chronic. Baseline Cr. 1.5. History of dialysis at time of liver transplant 23 years ago. Recurrent AKIs and immunosuppressive medication toxicity. Not on SGLT2i 2/2 recurrent UTI. Chart review w/ unclear heart failure history, unconfirmed with EF 60-65% on 3/10/25. BNP 1446 elevated from 1 month ago. Per cardiology on 6/17/25 Loop recording: Patient has been in persistent AF since 6/2/25. She has a history of pAF but this is the first persistent episode logged. She was  hospitalized from 5/28-6/12 with septic shock secondary to diverticulitis complicated by abscess which required sigmoidectomy and colostomy. She states that during her hospitalization her telemetry frequently alerted for HRs in the 40's. Her loop recorder is set to alert for HRs < 40bpm... Per ED/recent hospitalization: She is tachycardic with a heart rate of 120. EKG was done upon arrival and this demonstrates atrial fibrillation with RVR with a rate of 138.  Here in the ED she has had rates ranging from 110s to 140s.  Blood pressure is acceptable at 133/100. Suspect her atrial fibrillation is due to the fact that she has been off of her rate control and antihypertensive medication/beta-blocker since her recent critical illness.  Recent echocardiogram dated 6/4/2025 shows normal EF of 60 to 65%. We did give the patient a dose of diltiazem here in the emergency department followed by a diltiazem drip.  We did establish IV access and we did draw blood for laboratory analysis.  CBC shows a normal white count of 10.5, hemoglobin is 8.5, up from 7.5 from 2 days ago, platelets are 632, this thrombocytosis appears to be new, CMP shows CKD with a creatinine of 1.68, consistent with previous, mild hyperkalemia with a potassium of 5.5, otherwise electrolytes and LFTs appear to be within normal limits, magnesium within normal limits at 2.0, TSH within normal limits at 4.16, troponin is elevated at 54, this does appear to be somewhat above her baseline.  Repeat troponin is flat at 54.    Plan:  -Continue metoprolol succinate 25mg daily. HOLD if SBP<100 and/or HR<55  -Continue metoprolol tartrate TID PRN if HR>110. If used >2 time, advise to send to ED  -Monitor BP and HR  -Continue PTA evolocumab q2week.   -Continue PTA eztimibe 10mg every day  -Continue apixaban 2.5mg BID  -Follow up with cardiology as directed. Scheduled for 7/21/25  -CMP, CBC and CRP due 7/7/25             Concern for transient ischemic attack  Comment:  Acute. In the morning of 6/4, she stated that she had speech problems in the last couple of days since the surgery. She described that she has trouble getting her words out and that her speech did not feel as fluent as it was previously.  She denied any new numbness, tingling, headache or changes in vision. CT head was negative for intracranial bleed or other acute process and TTE with bubble study was also negative. It is possible that she may have had a TIA following surgery as anticoagulation had been held. MRI brain was negative for acute ischemic changes and her speech has returned to baseline.   Plan:  -Continue PTA Xarelto   -No need for neuro follow-up   -CMP, CBC and CRP due 7/7/25     T2DM  (A1c 6.8% 4/14/25)  Enlarged and hypertrophic nails  Comment: Chronic. Last A1c 6.8% on 4/14/25. She reports having ongoing early AM hypo-glycemia of levels dropping down to mid 60s. Asymptomatic  Plan:  -Continue PTA linagliptin 5mg every day  -All toenails were trimmed manually with trimmers at bedside today without concerns.   -Continue PTA gabapentin 300mg at bedtime  -Dietician involved to assist with offering and obtaining bedtime snack to prevent early AM hypoglycemia.   -Blood Glucose monitoring BID. HOLD off on using freestyle phil 2 sensor while on TCU  -CMP, CBC and CRP due 7/7/25  -Obtain hgb A1c as directed due 7/7/25    Hemoglobin A1C   Date Value Ref Range Status   05/29/2025 6.3 (H) <5.7 % Final     Comment:     Normal <5.7%   Prediabetes 5.7-6.4%    Diabetes 6.5% or higher     Note: Adopted from ADA consensus guidelines.   10/16/2018 6.4 (H) 0 - 5.6 % Final     Comment:     Normal <5.7% Prediabetes 5.7-6.4%  Diabetes 6.5% or higher - adopted from ADA   consensus guidelines.            Acute kidney injury on CKD3, resolved   Bilateral stent placement per urology 5/29  Acute tubular necrosis  Comment: Her baseline creatinine is 1.3-1.6. She had an DERREK in the setting of hypotension, shock, and nephrotoxic  meds which resolved after receiving IV fluids and improved PO after surgery.   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 Wound does not look infected. She now report having skin lesion to left anterior forearm. 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 Will defer to dermatology  Plan:  -Monitor for worsening s/symptoms of concerns  -Eucerin to face to moisturize PRN. May keep at bedside and self apply  -Follow up with dermatology --Scheduled for 7/8/25     Acute on chronic anemia  coagulopathy due to cirrhosis and surgical blood loss    thrombocytopenia   Comment: Chronic. Per recent hospitalization-Did not find any signs of overt bleeding and her ostomy output and vitals were stable. Continued her ferous sulfate and folic acid inpatient. She received 1 unit of packed red blood cells on 6/7. Stable at the time of discharge. Now average around 9  Plan:  -Monitor bleeding risks  -Continue PTA ferrous sulfate daily  -Recommend transfusion if hgb <7, low threshold to send to ED for transfusions needs if indicated  -Continue folic acid daily  -CMP, CBC and CRP due 7/7/25     Hypothyroidism  Papillary thyroid carcinoma  Chronic. Recent TSH~4.16 on 6/22/25. S/p thyroidectomy 2010  Plan;  -Continue PTA levothyroxine 175mcg qD   -Monitor for worsening s/symptoms of concerns     Chronic Shoulder Pain  Osteoarthritis   Lower back pain  Comment: Chronic. stable  Plan:  -Monitor pain complaints  -Continue diclofenac gel application to painful areas QID  -Continue Tylenol 1000mg BID   -Continue gabapentin 300mg at HS--only able to tolerate liquid given gelatin capsule allergy     Constipation   Nausea  GERD  Comment: Chronic. S/T polypharmacy.   Plan:  -Monitor BM patterns  -Continue TUMS 1000mg every 4  hours PRN  -Continue beneFiber qd    -Zofran PRN     Epistaxis  Comment: Acute on chronic. She reports occasional nose bleed at times. No episodes while on TCU.   Plan:  -Monitor bleeding risks  -Monitor for worsening s/symptoms of concerns  -Afrin BID PRN on TCU if warranted     Vaginal bleeding  Comment: Acute on chronic. Referred to OBGYN. Last menstrual period she reports has been 40 years ago or so. Known family history of endometrial cancer that metastasized to bowel per her reports. She reports her mother had extensive genetic testing for review in EPIC. Reports mother name: Anne Yap  3/9/23 for reference if indicated. Per OBGYN on 25:  If the endometrial stripe is greater than 4 mm or if it is less than 4 mm and she continues to have bleeding, she will need tissue evaluation with either office endometrial biopsy or hysteroscopy with D&C in the operating room. She does not believe she can tolerate an endometrial biopsy in the clinic, so we discussed doing this as an outpatient procedure.  Plan:  -Monitor for worsening s/symptoms of concerns  -Follow up with OBGYN as directed  -CMP, CBC and CRP due 25     Physical deconditioning  Generalized muscle weakness  History of falls  Comment: Acute on chronic. Known poor history PTA. [facility] given concerns. Per therapy-Patient requires MOdA for bed and UB needs. Maximum assistance for LB needs and toileting. Angel lift for all transfers given weakness. Increased anxiety and fear with transfers since TCU return.   Plan:  -Continue Physical therapy and Occupational therapy  -Recommend cognitive testing on site  -Highly recommend senior living compliance post TCU    45 minutes spent on the date of the encounter doing chart review, history and exam, PMH, documentation and further activities as noted above. Collaboration with nursing staff on site, clinical managers, and therapies were completed on site today. \     Electronically  signed by:  Dr. Manda Flor DNP, APRN, FNP-C, WCS-C, EDS-C     Provider reviewed records from facility, and interpreted most recent imaging/lab work, and vital signs.   Acute and chronic conditions managed by writer. Have been reviewed during today's exam         Sincerely,        Manda Flor, LIZ CNP    Electronically signed

## 2025-07-02 NOTE — NURSING NOTE
Current patient location: [address] [place] [address]   [facility] [id number]    Is the patient currently in the state of [place]? YES    Visit mode: VIDEO    If the visit is dropped, the patient can be reconnected by:VIDEO VISIT: Text to cell phone:   Telephone Information:   Mobile [phone]       Will anyone else be joining the visit? NO  (If patient encounters technical issues they should call [phone]    Are changes needed to the allergy or medication list? No    Are refills needed on medications prescribed by this physician? NO    Rooming Documentation:  Questionnaire(s) completed    Reason for visit: RECHECK    Evelyne Cat VVF  No vitals

## 2025-07-02 NOTE — LETTER
7/2/2025       RE: Luz Thompson  [address]  [facility] [address]     Dear Colleague,    Thank you for referring your patient, Luz Thompson, to the [facility] [place] [facility]. Please see a copy of my visit note below.    Patient could not connect to video, hence changed to a telephone visit   Telephone time 17 mins  Total time including chart review, care-coordination and documentation time on the date of encounter - 45 mins          [facility]   [address] [facility] [address]  Phone: [phone]  Fax: [phone]        Transplant Infectious Disease Consultation note  Today's Date: 07/02/2025    Recommendations:  - continue to monitor off of abxs  - will defer to CRS if follow up CT abdomen is needed    Rtc 3 months for chronic issues like recurrent UTI, vaccination etc.     Previously discussed:  - standing order for urine analysis and culture has been placed. Pls collect a midstream urine sample (educated on how to collect) before starting abx when UTI symptoms develop   - standing order for fosfomycin has been placed to start if UTI symptoms develop  - will stop or continue or alter abx based on urine cx result   - drink 2.5-3L of water a day   - always wipe from front to back after urinating   - install bidet or use bath mug or squirt bottle to wash after a bowel movt   - use vaginal estrogen cream 2-3 times a week regularly   - schedule a urology appt with Dr. Gutierrez    Thank you for involving me in the care of this patient. Please do not hesitate to contact me with any questions.     Assessment:  Luz Thompson is a 74 year old with PMH of primary biliary cirrhosis s/p Liver transplant in 2002 on sirolimus, prednisone 10 mg daily and ursodiol. History also significant for paroxysmal A-fib on Eliquis with loop recorder in place, DVT, DM  2, lymphedema, HTN, HLD, CVA, hypothyroidism, Sjogren syndrome, anxiety, glaucoma, anemia, chronic back pain, CKD stage IIIb, osteoporosis, knee issues affecting mobility and [place] fever in 2016/2017 on voriconazole till June 2024.     Persistent fevers: Almost daily since Thanksgiving 2024. Low grade but some to 102 and with chills.     Left hip pain started around the same time (X ray done with some osteophytes) and developed left cheek lesion (SCC, h/o several skin cancers).     Work up with Blood cxs, AFB blood cx, CMV PCR, EBV PCR, Crypto antigen, Cocci antigen, histo antigen, blasto antigen, fungal abs, BDG, urine cx.     Hospitalized March 2025 with fever to 102 and was diagnosed with Klebsiella pyelonephritis and Enterococcus bacteremia although source was not determined.     Hospitalized April 2025 with generalized weakness, CT abdomen showed a small collection with gas likely due to microperforation from diverticulitis (she has diverticulosis).     I suspect the cause of her symptoms/illness during the March and April 2025 admissions and probably the low grade fevers since Thanksgiving were due to diverticulitis which evolved to perforation and brought it to our attention.     Surgery recommended conservative management. She was placed on iv ertapenem for it which does not have great activity against enterococcus considering she had enterococcus bacteremia in March 2025. However since she was doing well ertapenem was continued.     Follow up Ct abdomen 5/5 showed increase in size of the diverticular abscess. Therefore ertapenem was changed to tigecycline on 5/8/25 and surgery was contacted. A date was scheduled for surgery but she was admitted 5/28 with hypotension and abdominal pain. Blood cx was pos for VRE (S to tigecycline), Pseudomonas and Klebsiella  She had sigmoidectomy and end colostomy 5/30/25   She rcvd ceftolozane-tazobactam (MARYJANE <=2 for both Pseudomonas and Klebsiella) and oral linezolid  "(MARYJANE 2 for VRE). Given appropriate source control, lack of endocarditis on TTE, and stable response, she completed a 7-day course of these abxs (6/3 (day of first neg blood cx)-6/9). Follow up Ct abdomen 6/6 without abscess.   Follow up 7/2 in ID clinic - she is doing well.     Recurrent UTIs for the past 5 years: she has occasional stress and urge incontinence. Dysuria does not always resolve with abxs. Recent hospitalization in Sep 2024 with urosepsis/bacteremia with ESBL.    I am not sure if all her episodes of UTIs are true UTIs since she has dysuria that does not resolve with abxs and because she has occasional incontinence issues as well.     Son reported in 4/2025 that mom is stool incontinent, that may be contributing to UTIs.     Will evaluate jointly the issue with urology. Patient has not followed with urology with the fever issue. Will monitor her symptoms and how many are true UTIs vs not. Many urine cxs subsequently neg.     Hx coccidioides  Dx in winter [id number], did not tolerate fluconazole due to severe rash. Treated with voriconazole, continued until her return to [place], stopped 6/2024. CXR with stable calcifications. Recent Cocci antigen was negative and Cocci Ab by CF neg.      Hx EBV viremia s/p rituxan 2016: No lymphadenopathy on recent abdomen/pelvis CT. Not detected 1/20/2025     #Numerous reported antimicrobial allergies  Per allergy note 8/2/2019 \"Prick and intradermal and patch testing to fluconazole, doxycycline, azithromycin, penicillins, and cephalosporins with immediate and delayed readings being negative.\" States she will never take fluconazole again. Would be candidate for oral provocation testing in future with pcn. Also notes an allergy to the  capsules, states tolerates pills or liquid formulations, sometimes needs them compounded.    Transplant check list:  - Current immunosuppression: tacrolimus, prednisone  - Bacterial coverage: none  - Pneumocystis prophylaxis: none  - " Serostatus & viral prophylaxis: EBV R+, CMV R-  - Fungal prophylaxis: none, previously voriconazole till 6/2024  - Risk factors to suggest check of Toxoplasma, Strongy, or Schisto serology?: toxoplasma negative 1/20/25  - Immunization status: will address later     Dr. Natividad Lundberg  Division of Infectious Disease  [facility]    -------------------------------------------------------------------------------------------------------------------   Interval events:  Last visit 4/28/25. At that time I had continued ertapenem. Follow up Ct abdomen showed increase in size of the diverticular abscess. Therefore ertapenem was changed to tigecycline 5/8/25 and surgery was contacted.     A date was scheduled for surgery but she was admitted 5/28 with hypotension and abdominal pain. Blood cx was pos for VRE (S to tigecycline), Pseudomonas and Klebsiella  She had sigmoidectomy and end colostomy 5/30/25   She rcvd ceftolozane-tazobactam (MARYJANE <=2 for both Pseudomonas and Klebsiella) and oral linezolid (MARYJANE 2 for VRE). Given appropriate source control, lack of endocarditis on TTE, and stable response, she completed a 7-day course of these abxs (6/3 (day of first neg blood cx)-6/9). Follow up Ct abdomen 6/6 without abscess.      [facility] as cannot walk secondary to deconditioning   Patient reports Colostomy is not healing as nicely as it should and she is having stoma issues   The laparotomy Incision has healed ok   She has chronic afib since a teenager with rate control issues - it has been better controlled nowafter recent admission for the same. Otherwise she reports her vitals are ok   She has Woc, CRS appt coming up   6/20 labs are ok. CRP mildly elevated at 7.23 (normal less than 5)    Sirolimus was changed to tacrolimus around 5/28/25 for wound healing.     Interval history:  Patient presents with son  Last visit 2/2025  She has had two admissions since that time :    3/8-3/14  for chills and fever to 102, was found to have E. Fecalis bacteremia and Klebsiella pneumoniae UTI. Work up did not reveal source of the bacteremia. ID had recommended vancomycin for 2 weeks till 3.22 and ertapenem for 3 weeks till 3.28. ertapenem was restarted around 4/8 due to positive urine cx for ESBL Klebsiella oxytoca and Proteus vulgaris.     She was hospitalized 4/14 due to generalized weakness since after Mohs procedure to the left cheek 2 days prior. Due to abdominal tenderness on exam a CT abdomen was obtained on 4/16 which showed  colonic diverticulosis, trace fluid, fat stranding, and edema around the distal sigmoid colon. Increased size of a fluid density w/ a focus of air (2.3 x 1.7 cm). This may represent diverticulitis with a contained microperforation.Of note, in 12/2024 she was found to a left colonic diverticula and small amount of fluid adjacent to the sigmoid colon possibly secondary to acute diverticulitis. She also has a remote history of rectal perforation w/ retroflexion on colonoscopy. CRS did not plan on surgery. She was dced on iv ertapenem for for planned 2-4 weeks duration (start date 4/16) pending CT abdomen.     The CT abdomen has not yet been scheduled. Patient reports that her daughter knows to schedule and is working on it.     She is at [facility] getting iv abxs and rehab. She reports she is doing well. Her right arm picc is working well and she is tolerating ertapenem. She had Lab work today, CBC, CMP ok except for leucocytosis to 14. She feels well and has no localizing symptoms. CRP has decreased to normal.     Son privately reported that his wife has to clean up patient many times off feces when visiting and if that may be contributing to UTIs. This information has been noted, will discuss with patient next visit     Interval History: 2/2025  I last saw her 12/2   She developed persistent fevers subsequently and was admitted multiple times at outside facilities  and at [facility] 1/19 to 1/23/25. Work up for fevers was unrevealing including Blood cxs, AFB blood cx, CMV, EBV, Crypto, Cocci antigen, histo antigen, blasto antigen, fungal abs, BDG, urine cx.     Fevers started shortly before thanksgiving 2024. Low grade fevers, usually 99. Sometimes 102, sometimes associated with chills.   99.2 this morning     Has been having Hip pain left for the past month - last hospital stay told it was due to the arthritis after an x ray- it waxes and waning and is worse in the morning     Left cheek lesion, started in Nov 2024. It started as a pimple and then erupted. It Weeps occasionally - clear drainage usually.   She has another skin lesion under her neck for a year now   She has had several skin cancers and assumes these are the same.   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 This was when she was diagnosed with [place] fever   Moved to [place] July 2024     Had blood in the urine a week ago (first time and only one episode) she was otherwise asymptomatic. She took fosfomycin 2 paks one day apart. UA/UC done the day after the hematuria by which time it had cleared was negative     Change in meds in the last 6 months: BP med in the last month and voriconazole stoppage in June 2024 only changes     CRP in Jan was normal     Reason for consult / Chief complaint: 12/2/24  Consulted by Dr. Ramirez for recurrent UTIs    History of presenting illness:  Luz Thompson is a 74 year old with PMH of primary biliary cirrhosis s/p Liver transplant in 2002 on sirolimus, prednisone 10 mg daily and  ursodiol. History also significant for paroxysmal A-fib on Eliquis with loop recorder in place, DVT, DM 2, lymphedema, HTN, HLD, CVA, hypothyroidism, Sjogren syndrome, anxiety, glaucoma, anemia, chronic back pain, CKD stage IIIb, osteoporosis, knee issues affecting mobility.     She reports recurrent UTI for many years, does not remember since when. But it is a lot more frequent in the past 5 years. They come in bouts and then regress. She reports 3 in the past 6 weeks   She is now on Fosfomycin - 3 paks - last dose 11/31. She had low grade fevers to 99.2-99.6 with this last bout of UTI, no fevers since yesterday.   She was recently Hospitalized twice in [facility] for urosepsis in Sep and possible UTI in Oct 2024. In Sep blood cx and urine cx pos for same organism and rcvd meropenem for 10 days). Readmitted in Oct for dysuria and rcvd meropenem for 14 days     She reports that Abxs do not completely resolve symptoms     Symptoms of UTI: end of urinary stream is painful, has bladder spasms - suprapubic pain, low grade fever     She has many abx allergies, and with increasing bacterial resistance, she can take only macrobid or fosfomycin. For macrobid she is allergic to the  capsules, therefore it has to be a pill or liquid formulation or compounding pharmacy    She has Occasional stress incontinence when her bladder is already full. She has Urge incontinence when she has an infection      Due to shoulder issue, she is not able to clean herself well after a bowel movt    She reports that vagina feels dry   She is Supposed to be using vainal estrogen cream every other day but forgets   She drinks 5, 16 ounce bottles of water a day - 2.5 L a day     Last two urine samples were collected via straight cath as difficult to collect it herself when not at home as the toilet is too high and not close to the sink. 11/6 urine cx no growth. On fosfomycin.     She has a urologist PA in [place]. [facility].   Ct abdomen is scheduled for 12/17   She reports having Cystoscopy in the past year in [place] and that looked ok   She does not think UDS has been done before     She Moved from [facility] to [facility] in July. 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     Bacteremia due to Enterococcus     Prophylactic antibiotic     High risk medication use     Statin intolerance     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     Infection due to Klebsiella pneumoniae     Sensorineural hearing loss (SNHL) of both ears     Abnormal liver function tests     Liver replaced by transplant (H)     Hyperlipidemia LDL goal <70     Hypertension goal BP (blood pressure) < 140/80     Postoperative hypothyroidism     Type 2 diabetes mellitus with stage 3b chronic kidney disease, without long-term current use of insulin (H)     Age-related osteoporosis without current pathological fracture     Tympanic membrane perforation, left     Other infective chronic otitis externa of left ear     Stage 3b chronic kidney disease (H)     Escherichia coli sepsis (H)     Physical deconditioning     Immunosuppression     Papillary thyroid carcinoma (H)     PAF (paroxysmal atrial fibrillation) (H)     Status post liver transplantation (H)     Elevated lactic acid level     Fever in adult     Urinary tract infection     Lung infection     Acute cystitis without hematuria     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     Pyelonephritis, acute     Generalized abdominal pain     Left flank pain     Immunocompromised     H/O liver transplant (H)     Sepsis without acute organ dysfunction, due to unspecified organism (H)     Immunosuppressed status     Transient hypotension     Pneumonia due to infectious  organism, unspecified laterality, unspecified part of lung     Pneumonia     Shortness of breath     Fever     DERREK (acute kidney injury)     Sepsis secondary to UTI (H)     Hard of hearing     Chronic diastolic heart failure (H)     UTI (urinary tract infection)     Diarrhea of presumed infectious origin     Diverticulitis of colon     Generalized weakness     Diverticulitis of large intestine with abscess without bleeding     Ostomy nurse consultation     Hyperkalemia     Atrial fibrillation with RVR (H)     Chronic kidney disease, unspecified CKD stage         Allergies:   Allergies   Allergen Reactions     Fluconazole Hives and Itching     Full body hives  **Intradermal skin testing negative**  [See intradermal skin testing results from 8/2/2019]     Mycophenolate Diarrhea and Nausea and Vomiting     Patient stated it was chronic and lasted months       Penicillins Anaphylaxis, Hives, Itching and Rash     **Intradermal skin testing negative**  [See intradermal skin testing results from 8/2/2019]       Simvastatin Muscle Pain (Myalgia)     severe  Other reaction(s): Myalgia caused by statin     Methotrexate Other (See Comments)     Other reaction(s): Sore  Sores in mouth, esophagus, and stomach.        Morphine And Codeine Itching and Other (See Comments)     Psych disturbance  Other reaction(s): Confusion, Mood alteration     Quinolones Anxiety, Dizziness, Headache, Other (See Comments), Palpitations and Unknown     Other reaction(s): Hyperactive behavior, Lightheadedness, Mood alteration    Dizzy, light headed    Dizziness, shaky, and jumpy     Capsules, Empty Gelatin [Gelatin]      Carvedilol Diarrhea     Per pt - severe diarrhea and LE swelling  + tolerating Toprol     Lansoprazole Diarrhea     Strawberry Extract      Azithromycin Itching     [See intradermal skin testing results from 8/2/2019]     Bactrim [Sulfamethoxazole-Trimethoprim] Other (See Comments)     Numb mouth, tingling lips (treated with  anti-histamines)     Cephalosporins Itching     [See intradermal skin testing results from 8/2/2019]     Ciprofloxacin Hcl Other (See Comments) and Dizziness     Insomnia, mood lability, Irregular heart beat          Doxycycline Itching and Unknown     [See intradermal skin testing results from 8/2/2019]     Lisinopril Cough     Omeprazole Itching     Tigecycline Other (See Comments)     Perioral numbness in 2025 with long-term use     Tolectin [Nsaids] Rash     Tolmetin Rash and Itching     Tramadol Rash, Hives and Itching         Medications:  Current Outpatient Medications   Medication Sig Dispense Refill     apixaban ANTICOAGULANT (ELIQUIS) 2.5 MG tablet Take 1 tablet (2.5 mg) by mouth 2 times daily. 60 tablet 0     Continuous Glucose Sensor (FREESTYLE NINO 2 SENSOR) MISC Change every 14 days. 2 each 5     evolocumab (REPATHA SURECLICK) 140 MG/ML prefilled autoinjector Inject 1 mL (140 mg) subcutaneously every 14 days. 6 mL 3     metoprolol succinate ER (TOPROL XL) 50 MG 24 hr tablet Take 1 tablet (50 mg) by mouth daily. HOLD if SBP<100 and/or HR<55       metoprolol tartrate (LOPRESSOR) 25 MG tablet Take 1 tablet (25 mg) by mouth 3 times daily as needed (For HR over 110 for >5 minutes and symptoms of chest pressure, pain, or dizziness take x1. May take again in 2 hours if needed.). If HR remains high after 2 doses of this medication, please contact a medical provider       tacrolimus (GENERIC) 1 mg/mL suspension Take 2 mLs (2 mg) by mouth 2 times daily. 120 mL 11     acetaminophen (TYLENOL) 500 MG tablet Take 1,000 mg by mouth 2 times daily. During 4/16/25 med recc pt states take BID everyday       albuterol (PROAIR HFA/PROVENTIL HFA/VENTOLIN HFA) 108 (90 Base) MCG/ACT inhaler Inhale 2 puffs into the lungs every 4 hours as needed for shortness of breath, wheezing or cough. 18 g 0     calcitRIOL (ROCALTROL) 0.25 MCG capsule Take 1 capsule (0.25 mcg) by mouth daily. 90 capsule 1     calcium carbonate (TUMS) 500  MG chewable tablet Take 2 tablets (1,000 mg) by mouth every 4 hours as needed for heartburn.       D-MANNOSE PO Take 1 Scoop by mouth 2 times daily.       diphenhydrAMINE (BENADRYL) 25 MG tablet Take 25 mg by mouth every 6 hours as needed for itching or allergies.       EPINEPHrine (ANY BX GENERIC EQUIV) 0.3 MG/0.3ML injection 2-pack Inject 0.3 mLs (0.3 mg) into the muscle as needed for anaphylaxis. May repeat one time in 5-15 minutes if response to initial dose is inadequate. 2 each 1     estradiol (ESTRACE) 0.1 MG/GM vaginal cream Place vaginally three times a week.       ezetimibe (ZETIA) 10 MG tablet Take 1 tablet (10 mg) by mouth daily. 90 tablet 3     Ferrous Sulfate 324 MG TBEC Take 1 tablet by mouth daily.       folic acid (FOLVITE) 1 MG tablet TAKE 1 TABLET BY MOUTH DAILY 90 tablet 3     gabapentin (NEURONTIN) 250 MG/5ML solution Take 6 mLs (300 mg) by mouth at bedtime 180 mL 0     Glucagon (GVOKE HYPOPEN) 1 MG/0.2ML pen Inject the contents of 1 device under the skin into lower abdomen, outer thigh, or outer upper arm as needed for hypoglycemia. If no response after 15 minutes, additional 1 mg dose from a new device may be injected while waiting for emergency assistance.       glucose (BD GLUCOSE) 5 g chewable tablet Take 2 tablets (10 g) by mouth as needed (low blood sugar) 40 tablet 1     glucose 40 % (400 mg/mL) gel Take 15 g by mouth every 15 minutes as needed for low blood sugar.       hydrOXYzine HCl (ATARAX) 25 MG tablet Take 1 tablet (25 mg) by mouth 2 times daily as needed for anxiety. 20 tablet 0     levothyroxine (SYNTHROID/LEVOTHROID) 175 MCG tablet Take 1 tablet (175 mcg) by mouth daily. 90 tablet 1     linagliptin (TRADJENTA) 5 MG TABS tablet Take 1 tablet (5 mg) by mouth daily. 90 tablet 1     mineral oil-white petrolatum (EUCERIN/MINERIN) cream Apply to face to moisturize PRN 57 g 1     Multiple Vitamins-Minerals (PRESERVISION AREDS 2) CAPS Take 1 capsule by mouth 2 times daily        Nutrisource Fiber PO packet Take 1 packet by mouth daily. Benefiber       omega-3 acid ethyl esters (LOVAZA) 1 g capsule Take 1 capsule (1 g) by mouth 2 times daily.       ondansetron (ZOFRAN) 4 MG tablet Take 1 tablet (4 mg) by mouth every 6 hours as needed for nausea.       oxymetazoline (AFRIN) 0.05 % nasal spray Spray 0.2 mLs (2 sprays) into both nostrils 2 times daily as needed for congestion. 30 mL 0     predniSONE (DELTASONE) 1 MG tablet Take 4 tablets (4 mg) by mouth daily. Take 4mg (1mg tablets x4) and Take with 5mg tablet to equal 9mg daily 90 tablet 0     predniSONE (DELTASONE) 5 MG tablet Take 1 tablet (5 mg) by mouth daily. Take with 5mg tablet with 4mg (1mg tablets x4) to equal 9mg daily 30 tablet 0     sertraline (ZOLOFT) 50 MG tablet Take 1 tablet (50 mg) by mouth daily. 30 tablet 0     triamcinolone (KENALOG) 0.1 % external ointment Apply topically 2 times daily.       ursodiol (ACTIGALL) 250 MG tablet Take 1 tablet (250 mg) by mouth 2 times daily. 180 tablet 3     No current facility-administered medications for this visit.         Immunizations:  Immunization History   Administered Date(s) Administered     COVID-19 12+ (Pfizer) 10/17/2024     COVID-19 Bivalent 12+ (Pfizer) 02/21/2021, 03/15/2021     COVID-19 MONOVALENT 12+ (Pfizer) 02/21/2021, 03/15/2021, 03/29/2021, 10/09/2021     COVID-19 Vaccine, Unspecified 02/08/2021, 03/15/2021, 03/29/2021     Flu 65+ (Fluad) 12/21/2019     Flu, Unspecified 11/07/2022     [id number] Novel Flu P-free 11/10/2009     HEPA 07/01/2001     HepB, Unspecified 07/01/2001     Influenza (High Dose) Trivalent,PF (Fluzone) 09/25/2015, 10/30/2016, 10/23/2017, 10/24/2024     Influenza (IIV3) PF 11/10/2004, 09/29/2007, 10/01/2010, 09/27/2012, 10/29/2012, 09/30/2013, 09/01/2016     Influenza Vaccine 18-64 (Flublok) 10/20/2018     Influenza Vaccine 65+ (Fluzone HD) 11/03/2020, 03/14/2022, 10/20/2022     Influenza Vaccine, 6+MO IM (QUADRIVALENT W/PRESERVATIVES) 10/01/2020      Pneumo Conj 13-V (2010&after) 02/26/2014, 01/01/2016     Pneumococcal 23 valent 12/01/2007, 01/01/2014, 05/25/2016     TD,PF 7+ (Tenivac) 01/01/2007     TDAP (Adacel,Boostrix) 01/01/2014     TDAP Vaccine (Adacel) 09/17/2007, 02/26/2014         Examination:  Unable to examine due to virtual visit     Laboratory:  Hematology:  Recent Labs   Lab Test 06/30/25  1003 06/27/25  1013 06/23/25  0543   WBC 10.1 9.3 10.2   RBC 3.10* 3.04* 2.80*   HGB 9.1* 8.8* 8.1*   HCT 30.9* 30.6* 27.2*    101* 97   MCH 29.4 28.9 28.9   MCHC 29.4* 28.8* 29.8*   RDW 21.5* 21.5* 20.1*   * 705* 638*       Chemistry:  Recent Labs   Lab Test 06/30/25  1003 06/27/25  1013 06/25/25  1152 06/25/25  0831 06/25/25  0811 06/23/25  0552 06/23/25  0543    140  --   --   --   --  141   POTASSIUM 4.9 4.9  --   --  5.0   < > 4.6   CHLORIDE 105 107  --   --   --   --  107   CO2 21* 22  --   --   --   --  22   ANIONGAP 12 11  --   --   --   --  12   * 91 104*   < >  --    < > 89   BUN 30.9* 32.0*  --   --   --   --  38.0*   CR 1.35* 1.42*  --   --   --   --  1.56*   KEILY 9.5 8.8  --   --   --   --  8.7*    < > = values in this interval not displayed.       Liver Function Studies:  Liver Function Studies -   Recent Labs   Lab Test 06/30/25  1003   PROTTOTAL 6.0*   ALBUMIN 3.0*   BILITOTAL 0.2   ALKPHOS 101   AST 18   ALT 11         Immune Globulin Studies:  Recent Labs   Lab Test 08/05/19  1552   IGG 1,110             Microbiology:    6/2/25 VRE     5/31/25 VRE    5/28 Pseudomonas and Klebsiella     3/8/25 blood cx enterococcus fecalis    Jan 2025: Blood cxs, AFB blood cx, CMV PCR, EBV PCR, Crypto antigen, Cocci antigen, histo antigen, blasto antigen, fungal abs, BDG, urine cx    9/22/24 blood cx ESBL ,, and Pseudomonas aeruginosa     Urine cxs  4/7/25 50-100k ESBL Klebsiella oxytoca and Proteus vulgaris   3/8/25 >100k Klebsiella pneumoniae  2/7/25 10-50k mixed louann   1/22/25 10-50k mixed louann   1/19/25 <10k mixed  louann   1/10/25 <10k mixed louann  12/10/24 >100k Klebsiella pneumoniae  11/6/24 - no growth   10/11/24 50-100k mixed urogenital louann (before abx was administered but she might have taken oral abxs prior)  9/22/24 >100k ESBL Klebsiella oxytoca  8/20/24 >100k ESBL,,    Imaging:  CT abdomen W 6/6/25  1. 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  Mild fat stranding surrounding the descending colon without wall  thickening is likely postoperative edema. There is small amount of  free fluid in the pelvis and paracolic gutters. No evidence of  organized fluid collection or abscess.    2. Colonic diverticulosis without acute diverticulitis.  3. Bilateral pleural effusions with adjacent atelectasis.  4. Postsurgical changes of liver transplantation with mild  intrahepatic biliary dilatation in the right lobe segment 5 is  unchanged from prior studies dating back to 3/8/2025.  5. Stable chronic incidental findings as described int eh body of the  report.    CT AP with 5/5/25  1. Mild enlargement of fluid collection containing focus of air  posterior to the sigmoid colon measuring up to 4.1 cm in craniocaudal  dimension, which remains concerning for a contained perforation from  prior diverticulitis.  2. 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  3. Postsurgical changes of liver transplant with stable curvilinear  hypodensity in the inferior right lobe. Interval new mild pneumobilia.  4. Reduced trace bilateral pleural effusions with adjacent  atelectasis.   5. Stable ancillary findings as detailed in the body of the report.     CT abdomen pelvis 4/16/25  1.  New fat stranding and mesenteric edema about the distal sigmoid  colon in the midline, nonspecific, but may represent diverticulitis.  Suspected contained microperforation with small fluid collection  containing tiny focus of air posterior to the sigmoid colon.  2.  Distended bladder  within normal limits.  3.  Unchanged fluid density 1.2 cm lesion in the region of the  pancreatic head.  4.  Stable postsurgical changes of liver transplant and curvilinear  hypodensity in the inferior right lobe.  5.  Other chronic CT findings including small bilateral pleural  effusions and right greater than left basilar calcified opacities are  unchanged.    CT abdomen pelvis 3/8/25  1. Postsurgical changes of liver transplant, subtle curvilinear  hypodensity in the inferior liver may represent peripheral biliary  ductal dilatation or edema, similar to slightly more conspicuous  compared to prior CT scan; if persistent clinical concern for  cholangitis, consider gastroenterology consult and/or MRCP.  2. Fluid density 1.4 cm lesion in the pancreatic head region, may  represent pancreatic cyst/IPMN, consider follow-up as suggested  below..  3. Diverticulosis without definite diverticulitis  4. Additional findings similar to prior CT from 1/22/2025, including  right lung base calcifications and  densities.                        Again, thank you for allowing me to participate in the care of your patient.      Sincerely,    Natividad Lundberg MD

## 2025-07-02 NOTE — PROGRESS NOTES
Patient could not connect to video, hence changed to a telephone visit   Telephone time 17 mins  Total time including chart review, care-coordination and documentation time on the date of encounter - 45 mins          [facility]   [address] [facility] [address]  Phone: [phone]  Fax: [phone]        Transplant Infectious Disease Consultation note  Today's Date: 07/02/2025    Recommendations:  - continue to monitor off of abxs  - will defer to CRS if follow up CT abdomen is needed    Rtc 3 months for chronic issues like recurrent UTI, vaccination etc.     Previously discussed:  - standing order for urine analysis and culture has been placed. Pls collect a midstream urine sample (educated on how to collect) before starting abx when UTI symptoms develop   - standing order for fosfomycin has been placed to start if UTI symptoms develop  - will stop or continue or alter abx based on urine cx result   - drink 2.5-3L of water a day   - always wipe from front to back after urinating   - install bidet or use bath mug or squirt bottle to wash after a bowel movt   - use vaginal estrogen cream 2-3 times a week regularly   - schedule a urology appt with Dr. Gutierrez    Thank you for involving me in the care of this patient. Please do not hesitate to contact me with any questions.     Assessment:  Luz Thompson is a 74 year old with PMH of primary biliary cirrhosis s/p Liver transplant in 2002 on sirolimus, prednisone 10 mg daily and ursodiol. History also significant for paroxysmal A-fib on Eliquis with loop recorder in place, DVT, DM 2, lymphedema, HTN, HLD, CVA, hypothyroidism, Sjogren syndrome, anxiety, glaucoma, anemia, chronic back pain, CKD stage IIIb, osteoporosis, knee issues affecting mobility and [place] fever in 2016/2017 on voriconazole till June 2024.     Persistent fevers: Almost daily since Thanksgiving 2024. Low grade but some to 102 and with  chills.     Left hip pain started around the same time (X ray done with some osteophytes) and developed left cheek lesion (SCC, h/o several skin cancers).     Work up with Blood cxs, AFB blood cx, CMV PCR, EBV PCR, Crypto antigen, Cocci antigen, histo antigen, blasto antigen, fungal abs, BDG, urine cx.     Hospitalized March 2025 with fever to 102 and was diagnosed with Klebsiella pyelonephritis and Enterococcus bacteremia although source was not determined.     Hospitalized April 2025 with generalized weakness, CT abdomen showed a small collection with gas likely due to microperforation from diverticulitis (she has diverticulosis).     I suspect the cause of her symptoms/illness during the March and April 2025 admissions and probably the low grade fevers since Thanksgiving were due to diverticulitis which evolved to perforation and brought it to our attention.     Surgery recommended conservative management. She was placed on iv ertapenem for it which does not have great activity against enterococcus considering she had enterococcus bacteremia in March 2025. However since she was doing well ertapenem was continued.     Follow up Ct abdomen 5/5 showed increase in size of the diverticular abscess. Therefore ertapenem was changed to tigecycline on 5/8/25 and surgery was contacted. A date was scheduled for surgery but she was admitted 5/28 with hypotension and abdominal pain. Blood cx was pos for VRE (S to tigecycline), Pseudomonas and Klebsiella  She had sigmoidectomy and end colostomy 5/30/25   She rcvd ceftolozane-tazobactam (MARYJANE <=2 for both Pseudomonas and Klebsiella) and oral linezolid (MARYJANE 2 for VRE). Given appropriate source control, lack of endocarditis on TTE, and stable response, she completed a 7-day course of these abxs (6/3 (day of first neg blood cx)-6/9). Follow up Ct abdomen 6/6 without abscess.   Follow up 7/2 in ID clinic - she is doing well.     Recurrent UTIs for the past 5 years: she has  "occasional stress and urge incontinence. Dysuria does not always resolve with abxs. Recent hospitalization in Sep 2024 with urosepsis/bacteremia with ESBL.    I am not sure if all her episodes of UTIs are true UTIs since she has dysuria that does not resolve with abxs and because she has occasional incontinence issues as well.     Son reported in 4/2025 that mom is stool incontinent, that may be contributing to UTIs.     Will evaluate jointly the issue with urology. Patient has not followed with urology with the fever issue. Will monitor her symptoms and how many are true UTIs vs not. Many urine cxs subsequently neg.     Hx coccidioides  Dx in winter [id number], did not tolerate fluconazole due to severe rash. Treated with voriconazole, continued until her return to [place], stopped 6/2024. CXR with stable calcifications. Recent Cocci antigen was negative and Cocci Ab by CF neg.      Hx EBV viremia s/p rituxan 2016: No lymphadenopathy on recent abdomen/pelvis CT. Not detected 1/20/2025     #Numerous reported antimicrobial allergies  Per allergy note 8/2/2019 \"Prick and intradermal and patch testing to fluconazole, doxycycline, azithromycin, penicillins, and cephalosporins with immediate and delayed readings being negative.\" States she will never take fluconazole again. Would be candidate for oral provocation testing in future with pcn. Also notes an allergy to the  capsules, states tolerates pills or liquid formulations, sometimes needs them compounded.    Transplant check list:  - Current immunosuppression: tacrolimus, prednisone  - Bacterial coverage: none  - Pneumocystis prophylaxis: none  - Serostatus & viral prophylaxis: EBV R+, CMV R-  - Fungal prophylaxis: none, previously voriconazole till 6/2024  - Risk factors to suggest check of Toxoplasma, Strongy, or Schisto serology?: toxoplasma negative 1/20/25  - Immunization status: will address later     Dr. Natividad Lundberg  Division of Infectious " Disease  [facility]    -------------------------------------------------------------------------------------------------------------------   Interval events:  Last visit 4/28/25. At that time I had continued ertapenem. Follow up Ct abdomen showed increase in size of the diverticular abscess. Therefore ertapenem was changed to tigecycline 5/8/25 and surgery was contacted.     A date was scheduled for surgery but she was admitted 5/28 with hypotension and abdominal pain. Blood cx was pos for VRE (S to tigecycline), Pseudomonas and Klebsiella  She had sigmoidectomy and end colostomy 5/30/25   She rcvd ceftolozane-tazobactam (MAYRJANE <=2 for both Pseudomonas and Klebsiella) and oral linezolid (MARYJANE 2 for VRE). Given appropriate source control, lack of endocarditis on TTE, and stable response, she completed a 7-day course of these abxs (6/3 (day of first neg blood cx)-6/9). Follow up Ct abdomen 6/6 without abscess.      [facility] to [facility] as cannot walk secondary to deconditioning   Patient reports Colostomy is not healing as nicely as it should and she is having stoma issues   The laparotomy Incision has healed ok   She has chronic afib since a teenager with rate control issues - it has been better controlled nowafter recent admission for the same. Otherwise she reports her vitals are ok   She has Woc, CRS appt coming up   6/20 labs are ok. CRP mildly elevated at 7.23 (normal less than 5)    Sirolimus was changed to tacrolimus around 5/28/25 for wound healing.     Interval history:  Patient presents with son  Last visit 2/2025  She has had two admissions since that time :    3/8-3/14 for chills and fever to 102, was found to have E. Fecalis bacteremia and Klebsiella pneumoniae UTI. Work up did not reveal source of the bacteremia. ID had recommended vancomycin for 2 weeks till 3.22 and ertapenem for 3 weeks till 3.28. ertapenem was restarted around 4/8 due to positive urine cx for ESBL  Klebsiella oxytoca and Proteus vulgaris.     She was hospitalized 4/14 due to generalized weakness since after Mohs procedure to the left cheek 2 days prior. Due to abdominal tenderness on exam a CT abdomen was obtained on 4/16 which showed  colonic diverticulosis, trace fluid, fat stranding, and edema around the distal sigmoid colon. Increased size of a fluid density w/ a focus of air (2.3 x 1.7 cm). This may represent diverticulitis with a contained microperforation.Of note, in 12/2024 she was found to a left colonic diverticula and small amount of fluid adjacent to the sigmoid colon possibly secondary to acute diverticulitis. She also has a remote history of rectal perforation w/ retroflexion on colonoscopy. CRS did not plan on surgery. She was dced on iv ertapenem for for planned 2-4 weeks duration (start date 4/16) pending CT abdomen.     The CT abdomen has not yet been scheduled. Patient reports that her daughter knows to schedule and is working on it.     She is at [facility] getting iv abxs and rehab. She reports she is doing well. Her right arm picc is working well and she is tolerating ertapenem. She had Lab work today, CBC, CMP ok except for leucocytosis to 14. She feels well and has no localizing symptoms. CRP has decreased to normal.     Son privately reported that his wife has to clean up patient many times off feces when visiting and if that may be contributing to UTIs. This information has been noted, will discuss with patient next visit     Interval History: 2/2025  I last saw her 12/2   She developed persistent fevers subsequently and was admitted multiple times at outside facilities and at [facility] 1/19 to 1/23/25. Work up for fevers was unrevealing including Blood cxs, AFB blood cx, CMV, EBV, Crypto, Cocci antigen, histo antigen, blasto antigen, fungal abs, BDG, urine cx.     Fevers started shortly before thanksgiving 2024. Low grade fevers, usually 99. Sometimes 102, sometimes  associated with chills.   99.2 this morning     Has been having Hip pain left for the past month - last hospital stay told it was due to the arthritis after an x ray- it waxes and waning and is worse in the morning     Left cheek lesion, started in Nov 2024. It started as a pimple and then erupted. It Weeps occasionally - clear drainage usually.   She has another skin lesion under her neck for a year now   She has had several skin cancers and assumes these are the same.   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 This was when she was diagnosed with [place] fever   Moved to [place] July 2024     Had blood in the urine a week ago (first time and only one episode) she was otherwise asymptomatic. She took fosfomycin 2 paks one day apart. UA/UC done the day after the hematuria by which time it had cleared was negative     Change in meds in the last 6 months: BP med in the last month and voriconazole stoppage in June 2024 only changes     CRP in Jan was normal     Reason for consult / Chief complaint: 12/2/24  Consulted by Dr. Ramirez for recurrent UTIs    History of presenting illness:  Luz Thompson is a 74 year old with PMH of primary biliary cirrhosis s/p Liver transplant in 2002 on sirolimus, prednisone 10 mg daily and ursodiol. History also significant for paroxysmal A-fib on Eliquis with loop recorder in place, DVT, DM 2, lymphedema, HTN, HLD, CVA, hypothyroidism, Sjogren syndrome, anxiety, glaucoma, anemia, chronic back pain, CKD stage IIIb, osteoporosis, knee issues affecting mobility.     She reports recurrent  UTI for many years, does not remember since when. But it is a lot more frequent in the past 5 years. They come in bouts and then regress. She reports 3 in the past 6 weeks   She is now on Fosfomycin - 3 paks - last dose 11/31. She had low grade fevers to 99.2-99.6 with this last bout of UTI, no fevers since yesterday.   She was recently Hospitalized twice in [facility] for urosepsis in Sep and possible UTI in Oct 2024. In Sep blood cx and urine cx pos for same organism and rcvd meropenem for 10 days). Readmitted in Oct for dysuria and rcvd meropenem for 14 days     She reports that Abxs do not completely resolve symptoms     Symptoms of UTI: end of urinary stream is painful, has bladder spasms - suprapubic pain, low grade fever     She has many abx allergies, and with increasing bacterial resistance, she can take only macrobid or fosfomycin. For macrobid she is allergic to the  capsules, therefore it has to be a pill or liquid formulation or compounding pharmacy    She has Occasional stress incontinence when her bladder is already full. She has Urge incontinence when she has an infection      Due to shoulder issue, she is not able to clean herself well after a bowel movt    She reports that vagina feels dry   She is Supposed to be using vainal estrogen cream every other day but forgets   She drinks 5, 16 ounce bottles of water a day - 2.5 L a day     Last two urine samples were collected via straight cath as difficult to collect it herself when not at home as the toilet is too high and not close to the sink. 11/6 urine cx no growth. On fosfomycin.     She has a urologist PA in [place]. A specialty urology referral at the  was placed on 12/1.   Ct abdomen is scheduled for 12/17   She reports having Cystoscopy in the past year in [place] and that looked ok   She does not think UDS has been done before     She Moved from [facility] to the [facility] in July. 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    Bacteremia due to Enterococcus    Prophylactic antibiotic    High risk medication use    Statin intolerance    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    Infection due to Klebsiella pneumoniae    Sensorineural hearing loss (SNHL) of both ears    Abnormal liver function tests    Liver replaced by transplant (H)    Hyperlipidemia LDL goal <70    Hypertension goal BP (blood pressure) < 140/80    Postoperative hypothyroidism    Type 2 diabetes mellitus with stage 3b chronic kidney disease, without long-term current use of insulin (H)    Age-related osteoporosis without current pathological fracture    Tympanic membrane perforation, left    Other infective chronic otitis externa of left ear    Stage 3b chronic kidney disease (H)    Escherichia coli sepsis (H)    Physical deconditioning    Immunosuppression    Papillary thyroid carcinoma (H)    PAF (paroxysmal atrial fibrillation) (H)    Status post liver transplantation (H)    Elevated lactic acid level    Fever in adult    Urinary tract infection    Lung infection    Acute cystitis without hematuria    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    Pyelonephritis, acute    Generalized abdominal pain    Left flank pain    Immunocompromised    H/O liver transplant (H)    Sepsis without acute organ dysfunction, due to unspecified organism (H)    Immunosuppressed status    Transient hypotension    Pneumonia due to infectious organism, unspecified laterality, unspecified part of lung    Pneumonia    Shortness of breath    Fever    DERREK (acute kidney injury)    Sepsis secondary to UTI (H)    Hard of hearing    Chronic diastolic heart failure (H)    UTI (urinary tract infection)    Diarrhea of presumed infectious origin    Diverticulitis of colon    Generalized weakness     Diverticulitis of large intestine with abscess without bleeding    Ostomy nurse consultation    Hyperkalemia    Atrial fibrillation with RVR (H)    Chronic kidney disease, unspecified CKD stage         Allergies:   Allergies   Allergen Reactions    Fluconazole Hives and Itching     Full body hives  **Intradermal skin testing negative**  [See intradermal skin testing results from 8/2/2019]    Mycophenolate Diarrhea and Nausea and Vomiting     Patient stated it was chronic and lasted months      Penicillins Anaphylaxis, Hives, Itching and Rash     **Intradermal skin testing negative**  [See intradermal skin testing results from 8/2/2019]      Simvastatin Muscle Pain (Myalgia)     severe  Other reaction(s): Myalgia caused by statin    Methotrexate Other (See Comments)     Other reaction(s): Sore  Sores in mouth, esophagus, and stomach.       Morphine And Codeine Itching and Other (See Comments)     Psych disturbance  Other reaction(s): Confusion, Mood alteration    Quinolones Anxiety, Dizziness, Headache, Other (See Comments), Palpitations and Unknown     Other reaction(s): Hyperactive behavior, Lightheadedness, Mood alteration    Dizzy, light headed    Dizziness, shaky, and jumpy    Capsules, Empty Gelatin [Gelatin]     Carvedilol Diarrhea     Per pt - severe diarrhea and LE swelling  + tolerating Toprol    Lansoprazole Diarrhea    Strawberry Extract     Azithromycin Itching     [See intradermal skin testing results from 8/2/2019]    Bactrim [Sulfamethoxazole-Trimethoprim] Other (See Comments)     Numb mouth, tingling lips (treated with anti-histamines)    Cephalosporins Itching     [See intradermal skin testing results from 8/2/2019]    Ciprofloxacin Hcl Other (See Comments) and Dizziness     Insomnia, mood lability, Irregular heart beat         Doxycycline Itching and Unknown     [See intradermal skin testing results from 8/2/2019]    Lisinopril Cough    Omeprazole Itching    Tigecycline Other (See Comments)      Perioral numbness in 2025 with long-term use    Tolectin [Nsaids] Rash    Tolmetin Rash and Itching    Tramadol Rash, Hives and Itching         Medications:  Current Outpatient Medications   Medication Sig Dispense Refill    apixaban ANTICOAGULANT (ELIQUIS) 2.5 MG tablet Take 1 tablet (2.5 mg) by mouth 2 times daily. 60 tablet 0    Continuous Glucose Sensor (FREESTYLE NINO 2 SENSOR) MISC Change every 14 days. 2 each 5    evolocumab (REPATHA SURECLICK) 140 MG/ML prefilled autoinjector Inject 1 mL (140 mg) subcutaneously every 14 days. 6 mL 3    metoprolol succinate ER (TOPROL XL) 50 MG 24 hr tablet Take 1 tablet (50 mg) by mouth daily. HOLD if SBP<100 and/or HR<55      metoprolol tartrate (LOPRESSOR) 25 MG tablet Take 1 tablet (25 mg) by mouth 3 times daily as needed (For HR over 110 for >5 minutes and symptoms of chest pressure, pain, or dizziness take x1. May take again in 2 hours if needed.). If HR remains high after 2 doses of this medication, please contact a medical provider      tacrolimus (GENERIC) 1 mg/mL suspension Take 2 mLs (2 mg) by mouth 2 times daily. 120 mL 11    acetaminophen (TYLENOL) 500 MG tablet Take 1,000 mg by mouth 2 times daily. During 4/16/25 med recc pt states take BID everyday      albuterol (PROAIR HFA/PROVENTIL HFA/VENTOLIN HFA) 108 (90 Base) MCG/ACT inhaler Inhale 2 puffs into the lungs every 4 hours as needed for shortness of breath, wheezing or cough. 18 g 0    calcitRIOL (ROCALTROL) 0.25 MCG capsule Take 1 capsule (0.25 mcg) by mouth daily. 90 capsule 1    calcium carbonate (TUMS) 500 MG chewable tablet Take 2 tablets (1,000 mg) by mouth every 4 hours as needed for heartburn.      D-MANNOSE PO Take 1 Scoop by mouth 2 times daily.      diphenhydrAMINE (BENADRYL) 25 MG tablet Take 25 mg by mouth every 6 hours as needed for itching or allergies.      EPINEPHrine (ANY BX GENERIC EQUIV) 0.3 MG/0.3ML injection 2-pack Inject 0.3 mLs (0.3 mg) into the muscle as needed for anaphylaxis. May  repeat one time in 5-15 minutes if response to initial dose is inadequate. 2 each 1    estradiol (ESTRACE) 0.1 MG/GM vaginal cream Place vaginally three times a week.      ezetimibe (ZETIA) 10 MG tablet Take 1 tablet (10 mg) by mouth daily. 90 tablet 3    Ferrous Sulfate 324 MG TBEC Take 1 tablet by mouth daily.      folic acid (FOLVITE) 1 MG tablet TAKE 1 TABLET BY MOUTH DAILY 90 tablet 3    gabapentin (NEURONTIN) 250 MG/5ML solution Take 6 mLs (300 mg) by mouth at bedtime 180 mL 0    Glucagon (GVOKE HYPOPEN) 1 MG/0.2ML pen Inject the contents of 1 device under the skin into lower abdomen, outer thigh, or outer upper arm as needed for hypoglycemia. If no response after 15 minutes, additional 1 mg dose from a new device may be injected while waiting for emergency assistance.      glucose (BD GLUCOSE) 5 g chewable tablet Take 2 tablets (10 g) by mouth as needed (low blood sugar) 40 tablet 1    glucose 40 % (400 mg/mL) gel Take 15 g by mouth every 15 minutes as needed for low blood sugar.      hydrOXYzine HCl (ATARAX) 25 MG tablet Take 1 tablet (25 mg) by mouth 2 times daily as needed for anxiety. 20 tablet 0    levothyroxine (SYNTHROID/LEVOTHROID) 175 MCG tablet Take 1 tablet (175 mcg) by mouth daily. 90 tablet 1    linagliptin (TRADJENTA) 5 MG TABS tablet Take 1 tablet (5 mg) by mouth daily. 90 tablet 1    mineral oil-white petrolatum (EUCERIN/MINERIN) cream Apply to face to moisturize PRN 57 g 1    Multiple Vitamins-Minerals (PRESERVISION AREDS 2) CAPS Take 1 capsule by mouth 2 times daily      Nutrisource Fiber PO packet Take 1 packet by mouth daily. Benefiber      omega-3 acid ethyl esters (LOVAZA) 1 g capsule Take 1 capsule (1 g) by mouth 2 times daily.      ondansetron (ZOFRAN) 4 MG tablet Take 1 tablet (4 mg) by mouth every 6 hours as needed for nausea.      oxymetazoline (AFRIN) 0.05 % nasal spray Spray 0.2 mLs (2 sprays) into both nostrils 2 times daily as needed for congestion. 30 mL 0    predniSONE  (DELTASONE) 1 MG tablet Take 4 tablets (4 mg) by mouth daily. Take 4mg (1mg tablets x4) and Take with 5mg tablet to equal 9mg daily 90 tablet 0    predniSONE (DELTASONE) 5 MG tablet Take 1 tablet (5 mg) by mouth daily. Take with 5mg tablet with 4mg (1mg tablets x4) to equal 9mg daily 30 tablet 0    sertraline (ZOLOFT) 50 MG tablet Take 1 tablet (50 mg) by mouth daily. 30 tablet 0    triamcinolone (KENALOG) 0.1 % external ointment Apply topically 2 times daily.      ursodiol (ACTIGALL) 250 MG tablet Take 1 tablet (250 mg) by mouth 2 times daily. 180 tablet 3     No current facility-administered medications for this visit.         Immunizations:  Immunization History   Administered Date(s) Administered    COVID-19 12+ (Pfizer) 10/17/2024    COVID-19 Bivalent 12+ (Pfizer) 02/21/2021, 03/15/2021    COVID-19 MONOVALENT 12+ (Pfizer) 02/21/2021, 03/15/2021, 03/29/2021, 10/09/2021    COVID-19 Vaccine, Unspecified 02/08/2021, 03/15/2021, 03/29/2021    Flu 65+ (Fluad) 12/21/2019    Flu, Unspecified 11/07/2022    [id number] Novel Flu P-free 11/10/2009    HEPA 07/01/2001    HepB, Unspecified 07/01/2001    Influenza (High Dose) Trivalent,PF (Fluzone) 09/25/2015, 10/30/2016, 10/23/2017, 10/24/2024    Influenza (IIV3) PF 11/10/2004, 09/29/2007, 10/01/2010, 09/27/2012, 10/29/2012, 09/30/2013, 09/01/2016    Influenza Vaccine 18-64 (Flublok) 10/20/2018    Influenza Vaccine 65+ (Fluzone HD) 11/03/2020, 03/14/2022, 10/20/2022    Influenza Vaccine, 6+MO IM (QUADRIVALENT W/PRESERVATIVES) 10/01/2020    Pneumo Conj 13-V (2010&after) 02/26/2014, 01/01/2016    Pneumococcal 23 valent 12/01/2007, 01/01/2014, 05/25/2016    TD,PF 7+ (Tenivac) 01/01/2007    TDAP (Adacel,Boostrix) 01/01/2014    TDAP Vaccine (Adacel) 09/17/2007, 02/26/2014         Examination:  Unable to examine due to virtual visit     Laboratory:  Hematology:  Recent Labs   Lab Test 06/30/25  1003 06/27/25  1013 06/23/25  0543   WBC 10.1 9.3 10.2   RBC 3.10* 3.04* 2.80*   HGB 9.1*  8.8* 8.1*   HCT 30.9* 30.6* 27.2*    101* 97   MCH 29.4 28.9 28.9   MCHC 29.4* 28.8* 29.8*   RDW 21.5* 21.5* 20.1*   * 705* 638*       Chemistry:  Recent Labs   Lab Test 06/30/25  1003 06/27/25  1013 06/25/25  1152 06/25/25  0831 06/25/25  0811 06/23/25  0552 06/23/25  0543    140  --   --   --   --  141   POTASSIUM 4.9 4.9  --   --  5.0   < > 4.6   CHLORIDE 105 107  --   --   --   --  107   CO2 21* 22  --   --   --   --  22   ANIONGAP 12 11  --   --   --   --  12   * 91 104*   < >  --    < > 89   BUN 30.9* 32.0*  --   --   --   --  38.0*   CR 1.35* 1.42*  --   --   --   --  1.56*   KEILY 9.5 8.8  --   --   --   --  8.7*    < > = values in this interval not displayed.       Liver Function Studies:  Liver Function Studies -   Recent Labs   Lab Test 06/30/25  1003   PROTTOTAL 6.0*   ALBUMIN 3.0*   BILITOTAL 0.2   ALKPHOS 101   AST 18   ALT 11         Immune Globulin Studies:  Recent Labs   Lab Test 08/05/19  1552   IGG 1,110             Microbiology:    6/2/25 VRE     5/31/25 VRE    5/28 Pseudomonas and Klebsiella     3/8/25 blood cx enterococcus fecalis    Jan 2025: Blood cxs, AFB blood cx, CMV PCR, EBV PCR, Crypto antigen, Cocci antigen, histo antigen, blasto antigen, fungal abs, BDG, urine cx    9/22/24 blood cx ESBL ,, and Pseudomonas aeruginosa     Urine cxs  4/7/25 50-100k ESBL Klebsiella oxytoca and Proteus vulgaris   3/8/25 >100k Klebsiella pneumoniae  2/7/25 10-50k mixed louann   1/22/25 10-50k mixed louann   1/19/25 <10k mixed louann   1/10/25 <10k mixed louann  12/10/24 >100k Klebsiella pneumoniae  11/6/24 - no growth   10/11/24 50-100k mixed urogenital louann (before abx was administered but she might have taken oral abxs prior)  9/22/24 >100k ESBL Klebsiella oxytoca  8/20/24 >100k ESBL,,    Imaging:  CT abdomen W 6/6/25  1. 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  Mild fat stranding surrounding the descending colon without wall  thickening is  likely postoperative edema. There is small amount of  free fluid in the pelvis and paracolic gutters. No evidence of  organized fluid collection or abscess.    2. Colonic diverticulosis without acute diverticulitis.  3. Bilateral pleural effusions with adjacent atelectasis.  4. Postsurgical changes of liver transplantation with mild  intrahepatic biliary dilatation in the right lobe segment 5 is  unchanged from prior studies dating back to 3/8/2025.  5. Stable chronic incidental findings as described int eh body of the  report.    CT AP with 5/5/25  1. Mild enlargement of fluid collection containing focus of air  posterior to the sigmoid colon measuring up to 4.1 cm in craniocaudal  dimension, which remains concerning for a contained perforation from  prior diverticulitis.  2. 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  3. Postsurgical changes of liver transplant with stable curvilinear  hypodensity in the inferior right lobe. Interval new mild pneumobilia.  4. Reduced trace bilateral pleural effusions with adjacent  atelectasis.   5. Stable ancillary findings as detailed in the body of the report.     CT abdomen pelvis 4/16/25  1.  New fat stranding and mesenteric edema about the distal sigmoid  colon in the midline, nonspecific, but may represent diverticulitis.  Suspected contained microperforation with small fluid collection  containing tiny focus of air posterior to the sigmoid colon.  2.  Distended bladder within normal limits.  3.  Unchanged fluid density 1.2 cm lesion in the region of the  pancreatic head.  4.  Stable postsurgical changes of liver transplant and curvilinear  hypodensity in the inferior right lobe.  5.  Other chronic CT findings including small bilateral pleural  effusions and right greater than left basilar calcified opacities are  unchanged.    CT abdomen pelvis 3/8/25  1. Postsurgical changes of liver transplant, subtle  curvilinear  hypodensity in the inferior liver may represent peripheral biliary  ductal dilatation or edema, similar to slightly more conspicuous  compared to prior CT scan; if persistent clinical concern for  cholangitis, consider gastroenterology consult and/or MRCP.  2. Fluid density 1.4 cm lesion in the pancreatic head region, may  represent pancreatic cyst/IPMN, consider follow-up as suggested  below..  3. Diverticulosis without definite diverticulitis  4. Additional findings similar to prior CT from 1/22/2025, including  right lung base calcifications and  densities.

## 2025-07-04 ENCOUNTER — LAB REQUISITION (OUTPATIENT)
Dept: LAB | Facility: CLINIC | Age: 75
End: 2025-07-04
Payer: MEDICARE

## 2025-07-04 DIAGNOSIS — Z48.23 ENCOUNTER FOR AFTERCARE FOLLOWING LIVER TRANSPLANT (H): ICD-10-CM

## 2025-07-05 NOTE — PROGRESS NOTES
"[facility] GERIATRICS    Chief Complaint   Patient presents with    RECHECK     HPI:  Luz Thompson is a 75 year old  (1950), who is being seen today for an episodic care visit at: [facility]) [[id number]]. Today's concern is: The primary encounter diagnosis was Anxiety. Diagnoses of Colostomy present (H), Diverticulitis of colon, Chronic shoulder pain, unspecified laterality, Type 2 diabetes mellitus with stage 3b chronic kidney disease, without long-term current use of insulin (H), Atrial fibrillation with RVR (H), Status post liver transplantation (H), Hypothyroidism, unspecified type, Bilateral low back pain without sciatica, unspecified chronicity, Stage 3b chronic kidney disease (H), SOB (shortness of breath), Hx of sepsis, Physical deconditioning, Hypoxia, S/P laparoscopic-assisted sigmoidectomy, Intra-abdominal infection, Hypotension, unspecified hypotension type, Generalized muscle weakness, Vaginal bleeding, Nocturnal hypoxemia, Hypertension goal BP (blood pressure) < 140/80, 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, S/P Mohs surgery for basal cell carcinoma, Chronic anemia, Age-related osteoporosis without current pathological fracture, Hyperlipidemia LDL goal <70, Chronic diastolic heart failure (H), RASHIDA (obstructive sleep apnea), Depression, unspecified depression type, Personal history of fall, Leukocytosis, unspecified type, Epistaxis, Nausea, Gastroesophageal reflux disease with esophagitis, unspecified whether hemorrhage, Slow transit constipation, Papillary thyroid carcinoma (H), Thrombocytopenia, DERREK (acute kidney injury), Acute tubular necrosis, TIA (transient ischemic attack), Acute respiratory failure with hypoxia (H), Protein-calorie malnutrition, unspecified severity, and Sjogren's syndrome, with unspecified organ involvement were also pertinent to this visit.    Per SW in regards to her facility care plan:    Patient noted--\"errors'. " She reports that her osteoarthritis is in her left knee. Not her right knee as stated in careplan/diagnoses (M17.11)   She also reports not having congestive heart failure. She reports being independent with upper body dressing and bed mobility. I tried to tell her that sometimes what she does in therapy is more advanced than what's on the nursing careplan. Plus, the care plan just indicates 'dressing;' not specifying upper and lower which is probably why it says assistance of one.     She said she doesn't have a diagnosis of anxiety. I told her she has orders for anti-anxiety medication scheduled for 2 weeks. She said she thinks she should be notified when starting a new medication like that.      Under alteration in gastro-intestinal focus is says to 'avoid laying down for at least 1 hour after eating; keep hob elevated.' She said she was told to not sit up after eating meals but to recline back let the muscles relax after eating. It also says to avoid overeating. Provide small, frequent meals rather than 3 large ones. She reports getting 3 large meals. She also said she hasn't seen staff documenting stoma output/volume/color/etc. I told her that they probably are doing that, she may not be seeing them do it.     Under impaired immunity, it sais to encouraged fluid intake and adequate rest to 'bolster immune system.' She said that she doesn't want to bolster her immune system because of her liver transplant; that she's been taking immunosuppressor meds for years.     Under skin integrity, it says she has a history of pressure injury which she reports she does not have. She also reported not having dermatitis.     Under visual impairment, it says to remind to wear glasses when up. She says she only wears glasses when she's reading and is capable of knowing when do wear them. She does not need reminders.     Under dianoses: it says 'type 2 diabetes mellitus with diabetic chronic kidney disease (E11.22). She says she  "has Type 2 diabetes but it is not related to chronic kidney disease. She reports that her CKD is related to when she was in a coma when undergoing previous medical issue a long time ago. It also lists acute pyelonephritis (N10) which she reports she does not have. Her kidney issues are chronic, not acute    Met with patient who denies any chest pain, palpitations, shortness of breath, [place], lightheadedness, dizziness, or cough. Denies any abdominal discomfort. Denies N&V. Denies B&B concerns. Denies dysuria or frequency. Stools have been soft in colostomy pouch noted. Appetite good. Sleeping well. No pain complaints.     BP Readings from Last 3 Encounters:   07/07/25 139/89   07/02/25 (!) 161/90   06/30/25 138/82     Wt Readings from Last 5 Encounters:   07/07/25 80.8 kg (178 lb 3.2 oz)   07/02/25 82.1 kg (181 lb)   07/02/25 86.3 kg (190 lb 3.2 oz)   06/30/25 83.4 kg (183 lb 12.8 oz)   06/26/25 83.4 kg (183 lb 12.8 oz)     Allergies, and PMH/PSH reviewed in EPIC today.  REVIEW OF SYSTEMS:  4 point ROS including Respiratory, CV, GI and , other than that noted in the HPI,  is negative    Objective:   /89   Pulse 80   Temp 97.2  F (36.2  C)   Resp 18   Ht 1.702 m (5' 7\")   Wt 80.8 kg (178 lb 3.2 oz)   LMP 06/01/1988 (Approximate)   SpO2 96%   BMI 27.91 kg/m    GENERAL APPEARANCE:  Alert, in no distress, oriented, cooperative  ENT:  Mouth and posterior oropharynx normal, moist mucous membranes, [place]  EYES:  EOM, conjunctivae, lids, pupils and irises normal  NECK:  No adenopathy,masses or thyromegaly  RESP:  respiratory effort and palpation of chest normal, lungs clear to auscultation , no respiratory distress  CV:  regular rate and rhythm, no murmur, rub, or gallop, no edema, +2 pedal pulses  ABDOMEN:  normal bowel sounds, soft, nontender, no hepatosplenomegaly or other masses, no guarding or rebound  M/S:   Patient requires MOdA for bed and UB needs. Maximum assistance for LB needs and toileting. Angel " lift for all transfers given weakness. Increased anxiety and fear with transfers since TCU return.   SKIN:  Inspection of skin and subcutaneous tissue baseline, Palpation of skin and subcutaneous tissue baseline  NEURO:   Cranial nerves 2-12 are normal tested and grossly at patient's baseline, no purposeful movement in upper and lower extremities  PSYCH:  oriented X 3, memory impaired , affect and mood normal    Most Recent 3 CBC's:  Recent Labs   Lab Test 07/07/25  0748 06/30/25  1003 06/27/25  1013   WBC 11.0 10.1 9.3   HGB 9.8* 9.1* 8.8*   * 100 101*   * 630* 705*     Most Recent 3 BMP's:  Recent Labs   Lab Test 07/07/25  0748 06/30/25  1003 06/27/25  1013    138 140   POTASSIUM 5.3 4.9 4.9   CHLORIDE 107 105 107   CO2 23 21* 22   BUN 34.9* 30.9* 32.0*   CR 1.35* 1.35* 1.42*   ANIONGAP 12 12 11   KEILY 9.4 9.5 8.8   GLC 83 115* 91     Most Recent 2 LFT's:  Recent Labs   Lab Test 07/07/25  0748 06/30/25  1003   AST 22 18   ALT 18 11   ALKPHOS 86 101   BILITOTAL 0.2 0.2     Most Recent TSH and T4:  Recent Labs   Lab Test 06/22/25  2144 01/20/25  1635 11/14/24  1506   TSH 4.16   < > 3.31   T4  --   --  1.92*    < > = values in this interval not displayed.     Most Recent Hemoglobin A1c:  Recent Labs   Lab Test 07/07/25  0748   A1C 5.8*     Most Recent ESR & CRP:  Recent Labs   Lab Test 07/07/25  0748 04/28/25  1213 04/15/25  0613 06/13/23  1820 08/26/19  2331   SED  --   --  61*  --  78*   CRP  --   --   --   --  180.0*   CRPI <3.00   < >  --    < >  --     < > = values in this interval not displayed.     Most Recent Anemia Panel:  Recent Labs   Lab Test 07/07/25  0748 04/14/25  1410 04/07/25  0945 09/04/24  1008 06/15/23  1028   WBC 11.0   < > 6.5   < > 7.6   HGB 9.8*   < > 9.1*   < > 12.1   HCT 34.5*   < > 30.0*   < > 38.4   *   < > 88   < > 92   *   < > 351   < > 345   IRON  --   --  36*   < >  --    IRONSAT  --   --  13*   < >  --    FEB  --   --  277   < >  --    LAURA  --   --  44    < >  --    B12  --   --   --   --  456    < > = values in this interval not displayed.       ASSESSMENT/PLAN:  Diverticulitis complicated by abscess status post open sigmoidectomy with end colostomy 5/30/25  History of Sepsis due to Klebsiella pneumoniae and Pseudomonas aeruginosa bacteremia 5/28, likely of intra-abdominal origin   Comment: Long multiple hospitalization course. She required a slow steroid taper because of blood pressure dysregulations. Transitioned from meropenem to ceftolozane-tazobactam due to resistance (psuedomonas coverage) and switched vancomycin to linezolid in the setting of acute kidney injury for coverage of previous vancomycin-resistent enterococci (E Facecium). From infectious disease's perspective, there were no good oral antibiotic options and so she completed a 7-day IV antibiotic course at the hospital. There was new cloudy PATY drain output on 6/6 and ongoing leukocytosis but no new localizing symptoms so we continued to monitor and found. She had been seen in colorectal surgery clinic the day prior (5/27) where it was decided that she should undergo open sigmoidectomy with DLI.   Plan:  -Follow up with ID as directed.   -Follow up with Colorectal Surgery as directed. Scheduled for 7/15/25  -Follow up with WOCN visit for new ostomy visit as directed. Scheduled for 7/14/25  -Continue benefiber packet daily per request  -CMP, CBC and CRP due 7/7/25--results pending  -BMP and CBC due 7/14/25  -Continue colostomy care as directed:  *Peristomal wound: With pouch changes - clean open wound to medial side of stoma with wound spray. Cut piece of Hydrofera Blue Classic to size of open area. Wet Hydrofera Blue (#[id number]) with saline and squeeze out excess, place in wound base, apply small bead of ostomy paste to the medial edge of hydrafera blue/Pt's skin to help hold in/seal hydrafera blue, then cover with additional ostomy ring to secure, apply wafer and caulk open creases around barrier  "ring, then attach pouch   *LLQ Colostomy pouching plan:   Pouching system: ostomy supplies pouches: Marina 57 FECAL ([id number]) ostomy supplies barrier: Soldier 57mm SOFT CONVEX ([id number]). Accessories used: [facility] ostomy accessories: 2\" Cera Barrier Ring ([id number]), Powder ([id number]), and Cavilon no sting barrier film ([id number]). Apply stoma powder to any weepy open areas of skin around stoma.  Brush off all excess powder that does not stick. Apply Cavilon skin prep over powder - let dry before applying new pouch. Pull abdomen flat when applying new pouch (makes the stoma a [place] shape instead of oval). (See photo from 6/23/25 in Media). 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 Follows Dr. Ramirez. Transplant Hepatology was consulted and advised against sirolimus. PTA prednisone 9mg was resumed after weaning off IV hydrocortisone   Plan:  -Continue Tacrolimus Oral suspension 2mg/2mL BID. Must be suspension given unable to tolerate capsule due to allergy to filler--to be filled by [place] compound[facility] pharmacy  -Continue prednisone 9mg daily for now. (5mg with 4 tablets of 1mg=9mg total)  -Continue PTA ursodiol 250 mg BID  -Follow up with transplant team as directed. Scheduled for 8/22/25  -Follow up with nephrology as directed. Scheduled for 7/23/25  -Complete weekly tacrolimus trough level due on Mondays per transplant team. Next due 7/7/25--results pending  -CMP, CBC and CRP due 7/7/25--results pending  -BMP and CBC due 7/14/25     Depression   Protein calorie deficit malnutrition    Anxiety  Comment: Chronic. Appetite fair. Her weights have been stable around 180# since April 2025, however now noted increase in weight up to 190# on 7/1/25. SLUMS 26/30. Increased anxiety around transfers per therapy.   Plan:  -Monitor mood and behaviors  -Dietician to remain involved to assist with supplemental " needs  -Continue chocolate glucerna BID with meals for now--dietician to adjust accordingly  -Monitor for worsening s/symptoms of concerns  -Monitor for changes in mobility, eating and sleeping patterns  -Hydroxyzine PRN x 14 days for anxiety concerns  -Continue zoloft 50mg daily.   -ACP while on TCU  -CMP, CBC and CRP due 7/7/25--results pending  -BMP and CBC due 7/14/25     Mild Obstructive Sleep Apnea   Nocturnal Hypoxemia   Shortness of breath  Acute respiratory failure with hypoxia  Comment: Chronic. Sleep study in 2018 showing mild RASHIDA with recommendation to start CPAP. It does not appear that there was further follow up and not using CPAP.   Plan:  -Monitor sleeping patterns  -Continue oxygen 1-2L via NC to keep sats >88% PRN  -Continue albuterol inhaler every 4 hours PRN. May HUNTER  -Monitor respiratory status  -Follow up with sleep medicine when able  -CMP, CBC and CRP due 7/7/25--results pending  -BMP and CBC due 7/14/25     CKD3b w/ moderate proteinuria, at baseline  HTN  CVD/HLD  Atrial fibrillation with RVR  Comment: Chronic. Baseline Cr. 1.5. History of dialysis at time of liver transplant 23 years ago. Recurrent AKIs and immunosuppressive medication toxicity. Not on SGLT2i 2/2 recurrent UTI. Chart review w/ unclear heart failure history, unconfirmed with EF 60-65% on 3/10/25. BNP 1446 elevated from 1 month ago. Per cardiology on 6/17/25 Loop recording: Patient has been in persistent AF since 6/2/25. She has a history of pAF but this is the first persistent episode logged. She was hospitalized from 5/28-6/12 with septic shock secondary to diverticulitis complicated by abscess which required sigmoidectomy and colostomy. She states that during her hospitalization her telemetry frequently alerted for HRs in the 40's. Her loop recorder is set to alert for HRs < 40bpm... Per ED/recent hospitalization: She is tachycardic with a heart rate of 120. EKG was done upon arrival and this demonstrates atrial  fibrillation with RVR with a rate of 138.  Here in the ED she has had rates ranging from 110s to 140s.  Blood pressure is acceptable at 133/100. Suspect her atrial fibrillation is due to the fact that she has been off of her rate control and antihypertensive medication/beta-blocker since her recent critical illness.  Recent echocardiogram dated 6/4/2025 shows normal EF of 60 to 65%. We did give the patient a dose of diltiazem here in the emergency department followed by a diltiazem drip.  We did establish IV access and we did draw blood for laboratory analysis.  CBC shows a normal white count of 10.5, hemoglobin is 8.5, up from 7.5 from 2 days ago, platelets are 632, this thrombocytosis appears to be new, CMP shows CKD with a creatinine of 1.68, consistent with previous, mild hyperkalemia with a potassium of 5.5, otherwise electrolytes and LFTs appear to be within normal limits, magnesium within normal limits at 2.0, TSH within normal limits at 4.16, troponin is elevated at 54, this does appear to be somewhat above her baseline.  Repeat troponin is flat at 54.    Plan:  -Continue metoprolol succinate 25mg daily. HOLD if SBP<100 and/or HR<55  -Continue metoprolol tartrate TID PRN if HR>110. If used >2 time, advise to send to ED  -Monitor BP and HR  -Continue PTA evolocumab q2week.   -Continue PTA eztimibe 10mg every day  -Continue apixaban 2.5mg BID  -Follow up with cardiology as directed. Scheduled for 7/21/25  -CMP, CBC and CRP due 7/7/25--results pending  -BMP and CBC due 7/14/25         Concern for transient ischemic attack  Comment: Acute. In the morning of 6/4, she stated that she had speech problems in the last couple of days since the surgery. She described that she has trouble getting her words out and that her speech did not feel as fluent as it was previously.  She denied any new numbness, tingling, headache or changes in vision. CT head was negative for intracranial bleed or other acute process and TTE  with bubble study was also negative. It is possible that she may have had a TIA following surgery as anticoagulation had been held. MRI brain was negative for acute ischemic changes and her speech has returned to baseline.   Plan:  -Continue PTA Xarelto   -No need for neuro follow-up   -CMP, CBC and CRP due 7/7/25--results pending  -BMP and CBC due 7/14/25     T2DM  (A1c 6.8% 4/14/25)  Comment: Chronic. Last A1c 6.8% on 4/14/25. She reports having ongoing early AM hypo-glycemia of levels dropping down to mid 60s. Asymptomatic  Plan:  -Continue PTA linagliptin 5mg every day  -Continue PTA gabapentin 300mg at bedtime  -Dietician involved to assist with offering and obtaining bedtime snack to prevent early AM hypoglycemia.   -Blood Glucose monitoring BID. HOLD off on using freestyle phil 2 sensor while on TCU  -Obtain hgb A1c as directed due 7/7/25--results pending  -CMP, CBC and CRP due 7/7/25--results pending  -BMP and CBC due 7/14/25         Acute kidney injury on CKD3, resolved   Bilateral stent placement per urology 5/29  Acute tubular necrosis  Comment: Her baseline creatinine is 1.3-1.6. She had an DERREK in the setting of hypotension, shock, and nephrotoxic meds which resolved after receiving IV fluids and improved PO after surgery.   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 Wound does not look infected. She now report having skin lesion to left anterior forearm. 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 Will defer to dermatology  Plan:  -Monitor for worsening s/symptoms of concerns  -Eucerin to face to moisturize PRN. May keep at bedside and self apply  -Follow up with dermatology --Scheduled for 7/8/25     chronic anemia  coagulopathy due to  cirrhosis and surgical blood loss    thrombocytopenia   Comment: Chronic. Per recent hospitalization-Did not find any signs of overt bleeding and her ostomy output and vitals were stable. Continued her ferous sulfate and folic acid inpatient. She received 1 unit of packed red blood cells on . Stable at the time of discharge. Now average around 9  Plan:  -Monitor bleeding risks  -Continue PTA ferrous sulfate daily  -Recommend transfusion if hgb <7, low threshold to send to ED for transfusions needs if indicated  -Continue folic acid daily  -CMP, CBC and CRP due 25--results pending  -BMP and CBC due 25     Hypothyroidism  Papillary thyroid carcinoma  Chronic. Recent TSH~4.16 on 25. S/p thyroidectomy   Plan;  -Continue PTA levothyroxine 175mcg qD   -Monitor for worsening s/symptoms of concerns     Chronic Shoulder Pain  Osteoarthritis   Lower back pain  Comment: Chronic. stable  Plan:  -Monitor pain complaints  -Continue diclofenac gel application to painful areas QID  -Continue Tylenol 1000mg BID   -Continue gabapentin 300mg at HS--only able to tolerate liquid given gelatin capsule allergy     Constipation   Nausea  GERD  Comment: Chronic. S/T polypharmacy.   Plan:  -Monitor BM patterns  -Continue TUMS 1000mg every 4 hours PRN  -Continue beneFiber qd    -Zofran PRN     Epistaxis  Comment: Acute on chronic. She reports occasional nose bleed at times. No episodes while on TCU.   Plan:  -Monitor bleeding risks  -Monitor for worsening s/symptoms of concerns  -Afrin BID PRN on TCU if warranted     Vaginal bleeding  Comment: Acute on chronic. Referred to OBGYN. Last menstrual period she reports has been 40 years ago or so. Known family history of endometrial cancer that metastasized to bowel per her reports. She reports her mother had extensive genetic testing for review in EPIC. Reports mother name: Anne Yap  3/9/23 for reference if indicated. Per OBGYN on 25:  If the endometrial stripe  is greater than 4 mm or if it is less than 4 mm and she continues to have bleeding, she will need tissue evaluation with either office endometrial biopsy or hysteroscopy with D&C in the operating room. She does not believe she can tolerate an endometrial biopsy in the clinic, so we discussed doing this as an outpatient procedure. No further vaginal since her TCU readmission.   Plan:  -Monitor for worsening s/symptoms of concerns  -Follow up with OBGYN as directed  -CMP, CBC and CRP due 7/7/25--results pending  -BMP and CBC due 7/14/25     Physical deconditioning  Generalized muscle weakness  History of falls  Comment: Acute on chronic. Known poor history PTA. [facility] given concerns. Per therapy-Patient requires MOdA for bed and UB needs. Maximum assistance for LB needs and toileting. Angel lift for all transfers given weakness. Increased anxiety and fear with transfers since TCU return.   Plan:  -Continue Physical therapy and Occupational therapy  -Recommend cognitive testing on site  -Highly recommend halfway compliance post TCU     Electronically signed by:  Dr. Manda Flor DNP, APRN, FNP-C, WCS-C, EDS-C     Provider reviewed records from facility, and interpreted most recent imaging/lab work, and vital signs.   Acute and chronic conditions managed by writer. Have been reviewed during today's exam

## 2025-07-07 ENCOUNTER — TRANSITIONAL CARE UNIT VISIT (OUTPATIENT)
Dept: GERIATRICS | Facility: CLINIC | Age: 75
End: 2025-07-07
Payer: MEDICARE

## 2025-07-07 VITALS
RESPIRATION RATE: 18 BRPM | SYSTOLIC BLOOD PRESSURE: 139 MMHG | TEMPERATURE: 97.2 F | HEART RATE: 80 BPM | OXYGEN SATURATION: 96 % | WEIGHT: 178.2 LBS | HEIGHT: 67 IN | BODY MASS INDEX: 27.97 KG/M2 | DIASTOLIC BLOOD PRESSURE: 89 MMHG

## 2025-07-07 DIAGNOSIS — J96.01 ACUTE RESPIRATORY FAILURE WITH HYPOXIA (H): ICD-10-CM

## 2025-07-07 DIAGNOSIS — Z90.49 S/P LAPAROSCOPIC-ASSISTED SIGMOIDECTOMY: ICD-10-CM

## 2025-07-07 DIAGNOSIS — G47.33 OSA (OBSTRUCTIVE SLEEP APNEA): ICD-10-CM

## 2025-07-07 DIAGNOSIS — E03.9 HYPOTHYROIDISM, UNSPECIFIED TYPE: ICD-10-CM

## 2025-07-07 DIAGNOSIS — D69.6 THROMBOCYTOPENIA: ICD-10-CM

## 2025-07-07 DIAGNOSIS — E46 PROTEIN-CALORIE MALNUTRITION, UNSPECIFIED SEVERITY: ICD-10-CM

## 2025-07-07 DIAGNOSIS — Z91.81 PERSONAL HISTORY OF FALL: ICD-10-CM

## 2025-07-07 DIAGNOSIS — F32.A DEPRESSION, UNSPECIFIED DEPRESSION TYPE: ICD-10-CM

## 2025-07-07 DIAGNOSIS — R09.02 HYPOXIA: ICD-10-CM

## 2025-07-07 DIAGNOSIS — I95.9 HYPOTENSION, UNSPECIFIED HYPOTENSION TYPE: ICD-10-CM

## 2025-07-07 DIAGNOSIS — M81.0 AGE-RELATED OSTEOPOROSIS WITHOUT CURRENT PATHOLOGICAL FRACTURE: ICD-10-CM

## 2025-07-07 DIAGNOSIS — G47.34 NOCTURNAL HYPOXEMIA: ICD-10-CM

## 2025-07-07 DIAGNOSIS — E78.5 HYPERLIPIDEMIA LDL GOAL <70: ICD-10-CM

## 2025-07-07 DIAGNOSIS — I50.32 CHRONIC DIASTOLIC HEART FAILURE (H): ICD-10-CM

## 2025-07-07 DIAGNOSIS — D72.829 LEUKOCYTOSIS, UNSPECIFIED TYPE: ICD-10-CM

## 2025-07-07 DIAGNOSIS — B99.9 INTRA-ABDOMINAL INFECTION: ICD-10-CM

## 2025-07-07 DIAGNOSIS — I48.91 ATRIAL FIBRILLATION WITH RVR (H): ICD-10-CM

## 2025-07-07 DIAGNOSIS — Z86.19 HX OF SEPSIS: ICD-10-CM

## 2025-07-07 DIAGNOSIS — M35.00 SJOGREN'S SYNDROME, WITH UNSPECIFIED ORGAN INVOLVEMENT: ICD-10-CM

## 2025-07-07 DIAGNOSIS — M62.81 GENERALIZED MUSCLE WEAKNESS: ICD-10-CM

## 2025-07-07 DIAGNOSIS — Z93.3 COLOSTOMY PRESENT (H): ICD-10-CM

## 2025-07-07 DIAGNOSIS — N17.0 ACUTE TUBULAR NECROSIS: ICD-10-CM

## 2025-07-07 DIAGNOSIS — Z85.828 S/P MOHS SURGERY FOR BASAL CELL CARCINOMA: ICD-10-CM

## 2025-07-07 DIAGNOSIS — G89.29 CHRONIC SHOULDER PAIN, UNSPECIFIED LATERALITY: ICD-10-CM

## 2025-07-07 DIAGNOSIS — I10 HYPERTENSION GOAL BP (BLOOD PRESSURE) < 140/80: ICD-10-CM

## 2025-07-07 DIAGNOSIS — M25.519 CHRONIC SHOULDER PAIN, UNSPECIFIED LATERALITY: ICD-10-CM

## 2025-07-07 DIAGNOSIS — N18.32 STAGE 3B CHRONIC KIDNEY DISEASE (H): ICD-10-CM

## 2025-07-07 DIAGNOSIS — K59.01 SLOW TRANSIT CONSTIPATION: ICD-10-CM

## 2025-07-07 DIAGNOSIS — C44.329 SQUAMOUS CELL CANCER OF SKIN OF LEFT CHEEK: ICD-10-CM

## 2025-07-07 DIAGNOSIS — R53.81 PHYSICAL DECONDITIONING: ICD-10-CM

## 2025-07-07 DIAGNOSIS — K21.00 GASTROESOPHAGEAL REFLUX DISEASE WITH ESOPHAGITIS, UNSPECIFIED WHETHER HEMORRHAGE: ICD-10-CM

## 2025-07-07 DIAGNOSIS — N18.32 TYPE 2 DIABETES MELLITUS WITH STAGE 3B CHRONIC KIDNEY DISEASE, WITHOUT LONG-TERM CURRENT USE OF INSULIN (H): ICD-10-CM

## 2025-07-07 DIAGNOSIS — R06.02 SOB (SHORTNESS OF BREATH): ICD-10-CM

## 2025-07-07 DIAGNOSIS — Z98.890 S/P MOHS SURGERY FOR BASAL CELL CARCINOMA: ICD-10-CM

## 2025-07-07 DIAGNOSIS — Z94.4 STATUS POST LIVER TRANSPLANTATION (H): ICD-10-CM

## 2025-07-07 DIAGNOSIS — C73 PAPILLARY THYROID CARCINOMA (H): ICD-10-CM

## 2025-07-07 DIAGNOSIS — N93.9 VAGINAL BLEEDING: ICD-10-CM

## 2025-07-07 DIAGNOSIS — N17.9 AKI (ACUTE KIDNEY INJURY): ICD-10-CM

## 2025-07-07 DIAGNOSIS — K57.32 DIVERTICULITIS OF COLON: ICD-10-CM

## 2025-07-07 DIAGNOSIS — R04.0 EPISTAXIS: ICD-10-CM

## 2025-07-07 DIAGNOSIS — E11.22 TYPE 2 DIABETES MELLITUS WITH STAGE 3B CHRONIC KIDNEY DISEASE, WITHOUT LONG-TERM CURRENT USE OF INSULIN (H): ICD-10-CM

## 2025-07-07 DIAGNOSIS — M54.50 BILATERAL LOW BACK PAIN WITHOUT SCIATICA, UNSPECIFIED CHRONICITY: ICD-10-CM

## 2025-07-07 DIAGNOSIS — G45.9 TIA (TRANSIENT ISCHEMIC ATTACK): ICD-10-CM

## 2025-07-07 DIAGNOSIS — F41.9 ANXIETY: Primary | ICD-10-CM

## 2025-07-07 DIAGNOSIS — R11.0 NAUSEA: ICD-10-CM

## 2025-07-07 DIAGNOSIS — D64.9 CHRONIC ANEMIA: ICD-10-CM

## 2025-07-07 LAB
ALBUMIN SERPL BCG-MCNC: 3.2 G/DL (ref 3.5–5.2)
ALP SERPL-CCNC: 86 U/L (ref 40–150)
ALT SERPL W P-5'-P-CCNC: 18 U/L (ref 0–50)
ANION GAP SERPL CALCULATED.3IONS-SCNC: 12 MMOL/L (ref 7–15)
AST SERPL W P-5'-P-CCNC: 22 U/L (ref 0–45)
BASOPHILS # BLD AUTO: 0.1 10E3/UL (ref 0–0.2)
BASOPHILS NFR BLD AUTO: 1 %
BILIRUB SERPL-MCNC: 0.2 MG/DL
BUN SERPL-MCNC: 34.9 MG/DL (ref 8–23)
CALCIUM SERPL-MCNC: 9.4 MG/DL (ref 8.8–10.4)
CHLORIDE SERPL-SCNC: 107 MMOL/L (ref 98–107)
CREAT SERPL-MCNC: 1.35 MG/DL (ref 0.51–0.95)
CRP SERPL-MCNC: <3 MG/L
EGFRCR SERPLBLD CKD-EPI 2021: 41 ML/MIN/1.73M2
EOSINOPHIL # BLD AUTO: 0.1 10E3/UL (ref 0–0.7)
EOSINOPHIL NFR BLD AUTO: 1 %
ERYTHROCYTE [DISTWIDTH] IN BLOOD BY AUTOMATED COUNT: 21 % (ref 10–15)
EST. AVERAGE GLUCOSE BLD GHB EST-MCNC: 120 MG/DL
GLUCOSE SERPL-MCNC: 83 MG/DL (ref 70–99)
HBA1C MFR BLD: 5.8 %
HCO3 SERPL-SCNC: 23 MMOL/L (ref 22–29)
HCT VFR BLD AUTO: 34.5 % (ref 35–47)
HGB BLD-MCNC: 9.8 G/DL (ref 11.7–15.7)
IMM GRANULOCYTES # BLD: 0.5 10E3/UL
IMM GRANULOCYTES NFR BLD: 5 %
LYMPHOCYTES # BLD AUTO: 2.2 10E3/UL (ref 0.8–5.3)
LYMPHOCYTES NFR BLD AUTO: 20 %
MCH RBC QN AUTO: 29.5 PG (ref 26.5–33)
MCHC RBC AUTO-ENTMCNC: 28.4 G/DL (ref 31.5–36.5)
MCV RBC AUTO: 104 FL (ref 78–100)
MONOCYTES # BLD AUTO: 1.1 10E3/UL (ref 0–1.3)
MONOCYTES NFR BLD AUTO: 10 %
NEUTROPHILS # BLD AUTO: 7 10E3/UL (ref 1.6–8.3)
NEUTROPHILS NFR BLD AUTO: 63 %
NRBC # BLD AUTO: 0 10E3/UL
NRBC BLD AUTO-RTO: 0 /100
PLATELET # BLD AUTO: 515 10E3/UL (ref 150–450)
POTASSIUM SERPL-SCNC: 5.3 MMOL/L (ref 3.4–5.3)
PROT SERPL-MCNC: 5.8 G/DL (ref 6.4–8.3)
RBC # BLD AUTO: 3.32 10E6/UL (ref 3.8–5.2)
SODIUM SERPL-SCNC: 142 MMOL/L (ref 135–145)
TACROLIMUS BLD-MCNC: 2.9 UG/L (ref 5–15)
TME LAST DOSE: ABNORMAL H
TME LAST DOSE: ABNORMAL H
WBC # BLD AUTO: 11 10E3/UL (ref 4–11)

## 2025-07-07 PROCEDURE — 86140 C-REACTIVE PROTEIN: CPT | Mod: ORL | Performed by: NURSE PRACTITIONER

## 2025-07-07 PROCEDURE — 36415 COLL VENOUS BLD VENIPUNCTURE: CPT | Mod: ORL | Performed by: NURSE PRACTITIONER

## 2025-07-07 PROCEDURE — 85025 COMPLETE CBC W/AUTO DIFF WBC: CPT | Mod: ORL | Performed by: NURSE PRACTITIONER

## 2025-07-07 PROCEDURE — 80053 COMPREHEN METABOLIC PANEL: CPT | Mod: ORL | Performed by: NURSE PRACTITIONER

## 2025-07-07 PROCEDURE — 80197 ASSAY OF TACROLIMUS: CPT | Mod: ORL | Performed by: NURSE PRACTITIONER

## 2025-07-07 PROCEDURE — 83036 HEMOGLOBIN GLYCOSYLATED A1C: CPT | Mod: ORL | Performed by: NURSE PRACTITIONER

## 2025-07-07 PROCEDURE — 99309 SBSQ NF CARE MODERATE MDM 30: CPT | Performed by: NURSE PRACTITIONER

## 2025-07-07 NOTE — LETTER
7/7/2025      Luz Thompson  [address]  [facility] [address]        [facility]ATR[facility]    Chief Complaint   Patient presents with     RECHECK     HPI:  Luz Thompson is a 75 year old  (1950), who is being seen today for an episodic care visit at: [facility] (TCU)([facility]) [[id number]]. Today's concern is: The primary encounter diagnosis was Anxiety. Diagnoses of Colostomy present (H), Diverticulitis of colon, Chronic shoulder pain, unspecified laterality, Type 2 diabetes mellitus with stage 3b chronic kidney disease, without long-term current use of insulin (H), Atrial fibrillation with RVR (H), Status post liver transplantation (H), Hypothyroidism, unspecified type, Bilateral low back pain without sciatica, unspecified chronicity, Stage 3b chronic kidney disease (H), SOB (shortness of breath), Hx of sepsis, Physical deconditioning, Hypoxia, S/P laparoscopic-assisted sigmoidectomy, Intra-abdominal infection, Hypotension, unspecified hypotension type, Generalized muscle weakness, Vaginal bleeding, Nocturnal hypoxemia, Hypertension goal BP (blood pressure) < 140/80, 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, S/P Mohs surgery for basal cell carcinoma, Chronic anemia, Age-related osteoporosis without current pathological fracture, Hyperlipidemia LDL goal <70, Chronic diastolic heart failure (H), RASHIDA (obstructive sleep apnea), Depression, unspecified depression type, Personal history of fall, Leukocytosis, unspecified type, Epistaxis, Nausea, Gastroesophageal reflux disease with esophagitis, unspecified whether hemorrhage, Slow transit constipation, Papillary thyroid carcinoma (H), Thrombocytopenia, DERREK (acute kidney injury), Acute tubular necrosis, TIA (transient ischemic attack), Acute respiratory failure with hypoxia (H), Protein-calorie malnutrition, unspecified severity, and Sjogren's syndrome, with unspecified organ involvement were also pertinent to  "this visit.    Per SW in regards to her facility care plan:    Patient noted--\"errors'. She reports that her osteoarthritis is in her left knee. Not her right knee as stated in careplan/diagnoses (M17.11)   She also reports not having congestive heart failure. She reports being independent with upper body dressing and bed mobility. I tried to tell her that sometimes what she does in therapy is more advanced than what's on the nursing careplan. Plus, the care plan just indicates 'dressing;' not specifying upper and lower which is probably why it says assistance of one.     She said she doesn't have a diagnosis of anxiety. I told her she has orders for anti-anxiety medication scheduled for 2 weeks. She said she thinks she should be notified when starting a new medication like that.      Under alteration in gastro-intestinal focus is says to 'avoid laying down for at least 1 hour after eating; keep hob elevated.' She said she was told to not sit up after eating meals but to recline back let the muscles relax after eating. It also says to avoid overeating. Provide small, frequent meals rather than 3 large ones. She reports getting 3 large meals. She also said she hasn't seen staff documenting stoma output/volume/color/etc. I told her that they probably are doing that, she may not be seeing them do it.     Under impaired immunity, it sais to encouraged fluid intake and adequate rest to 'bolster immune system.' She said that she doesn't want to bolster her immune system because of her liver transplant; that she's been taking immunosuppressor meds for years.     Under skin integrity, it says she has a history of pressure injury which she reports she does not have. She also reported not having dermatitis.     Under visual impairment, it says to remind to wear glasses when up. She says she only wears glasses when she's reading and is capable of knowing when do wear them. She does not need reminders.     Under dianoses: it " "says 'type 2 diabetes mellitus with diabetic chronic kidney disease (E11.22). She says she has Type 2 diabetes but it is not related to chronic kidney disease. She reports that her CKD is related to when she was in a coma when undergoing previous medical issue a long time ago. It also lists acute pyelonephritis (N10) which she reports she does not have. Her kidney issues are chronic, not acute    Met with patient who denies any chest pain, palpitations, shortness of breath, [place], lightheadedness, dizziness, or cough. Denies any abdominal discomfort. Denies N&V. Denies B&B concerns. Denies dysuria or frequency. Stools have been soft in colostomy pouch noted. Appetite good. Sleeping well. No pain complaints.     BP Readings from Last 3 Encounters:   07/07/25 139/89   07/02/25 (!) 161/90   06/30/25 138/82     Wt Readings from Last 5 Encounters:   07/07/25 80.8 kg (178 lb 3.2 oz)   07/02/25 82.1 kg (181 lb)   07/02/25 86.3 kg (190 lb 3.2 oz)   06/30/25 83.4 kg (183 lb 12.8 oz)   06/26/25 83.4 kg (183 lb 12.8 oz)     Allergies, and PMH/PSH reviewed in EPIC today.  REVIEW OF SYSTEMS:  4 point ROS including Respiratory, CV, GI and , other than that noted in the HPI,  is negative    Objective:   /89   Pulse 80   Temp 97.2  F (36.2  C)   Resp 18   Ht 1.702 m (5' 7\")   Wt 80.8 kg (178 lb 3.2 oz)   LMP 06/01/1988 (Approximate)   SpO2 96%   BMI 27.91 kg/m    GENERAL APPEARANCE:  Alert, in no distress, oriented, cooperative  ENT:  Mouth and posterior oropharynx normal, moist mucous membranes, [place]  EYES:  EOM, conjunctivae, lids, pupils and irises normal  NECK:  No adenopathy,masses or thyromegaly  RESP:  respiratory effort and palpation of chest normal, lungs clear to auscultation , no respiratory distress  CV:  regular rate and rhythm, no murmur, rub, or gallop, no edema, +2 pedal pulses  ABDOMEN:  normal bowel sounds, soft, nontender, no hepatosplenomegaly or other masses, no guarding or rebound  M/S:   Patient " requires MOdA for bed and UB needs. Maximum assistance for LB needs and toileting. Angel lift for all transfers given weakness. Increased anxiety and fear with transfers since TCU return.   SKIN:  Inspection of skin and subcutaneous tissue baseline, Palpation of skin and subcutaneous tissue baseline  NEURO:   Cranial nerves 2-12 are normal tested and grossly at patient's baseline, no purposeful movement in upper and lower extremities  PSYCH:  oriented X 3, memory impaired , affect and mood normal    Most Recent 3 CBC's:  Recent Labs   Lab Test 07/07/25  0748 06/30/25  1003 06/27/25  1013   WBC 11.0 10.1 9.3   HGB 9.8* 9.1* 8.8*   * 100 101*   * 630* 705*     Most Recent 3 BMP's:  Recent Labs   Lab Test 07/07/25  0748 06/30/25  1003 06/27/25  1013    138 140   POTASSIUM 5.3 4.9 4.9   CHLORIDE 107 105 107   CO2 23 21* 22   BUN 34.9* 30.9* 32.0*   CR 1.35* 1.35* 1.42*   ANIONGAP 12 12 11   KEILY 9.4 9.5 8.8   GLC 83 115* 91     Most Recent 2 LFT's:  Recent Labs   Lab Test 07/07/25  0748 06/30/25  1003   AST 22 18   ALT 18 11   ALKPHOS 86 101   BILITOTAL 0.2 0.2     Most Recent TSH and T4:  Recent Labs   Lab Test 06/22/25  2144 01/20/25  1635 11/14/24  1506   TSH 4.16   < > 3.31   T4  --   --  1.92*    < > = values in this interval not displayed.     Most Recent Hemoglobin A1c:  Recent Labs   Lab Test 07/07/25  0748   A1C 5.8*     Most Recent ESR & CRP:  Recent Labs   Lab Test 07/07/25  0748 04/28/25  1213 04/15/25  0613 06/13/23  1820 08/26/19  2331   SED  --   --  61*  --  78*   CRP  --   --   --   --  180.0*   CRPI <3.00   < >  --    < >  --     < > = values in this interval not displayed.     Most Recent Anemia Panel:  Recent Labs   Lab Test 07/07/25  0748 04/14/25  1410 04/07/25  0945 09/04/24  1008 06/15/23  1028   WBC 11.0   < > 6.5   < > 7.6   HGB 9.8*   < > 9.1*   < > 12.1   HCT 34.5*   < > 30.0*   < > 38.4   *   < > 88   < > 92   *   < > 351   < > 345   IRON  --   --  36*   < >   --    IRONSAT  --   --  13*   < >  --    FEB  --   --  277   < >  --    LAURA  --   --  44   < >  --    B12  --   --   --   --  456    < > = values in this interval not displayed.       ASSESSMENT/PLAN:  Diverticulitis complicated by abscess status post open sigmoidectomy with end colostomy 5/30/25  History of Sepsis due to Klebsiella pneumoniae and Pseudomonas aeruginosa bacteremia 5/28, likely of intra-abdominal origin   Comment: Long multiple hospitalization course. She required a slow steroid taper because of blood pressure dysregulations. Transitioned from meropenem to ceftolozane-tazobactam due to resistance (psuedomonas coverage) and switched vancomycin to linezolid in the setting of acute kidney injury for coverage of previous vancomycin-resistent enterococci (E Facecium). From infectious disease's perspective, there were no good oral antibiotic options and so she completed a 7-day IV antibiotic course at the hospital. There was new cloudy PATY drain output on 6/6 and ongoing leukocytosis but no new localizing symptoms so we continued to monitor and found. She had been seen in colorectal surgery clinic the day prior (5/27) where it was decided that she should undergo open sigmoidectomy with DLI.   Plan:  -Follow up with ID as directed.   -Follow up with Colorectal Surgery as directed. Scheduled for 7/15/25  -Follow up with WOCN visit for new ostomy visit as directed. Scheduled for 7/14/25  -Continue benefiber packet daily per request  -CMP, CBC and CRP due 7/7/25--results pending  -BMP and CBC due 7/14/25  -Continue colostomy care as directed:  *Peristomal wound: With pouch changes - clean open wound to medial side of stoma with wound spray. Cut piece of Hydrofera Blue Classic to size of open area. Wet Hydrofera Blue (#[id number]) with saline and squeeze out excess, place in wound base, apply small bead of ostomy paste to the medial edge of hydrafera blue/Pt's skin to help hold in/seal hydrafera blue, then cover  "with additional ostomy ring to secure, apply wafer and caulk open creases around barrier ring, then attach pouch   *LLQ Colostomy pouching plan:   Pouching system: ostomy supplies pouches: Aurora 57 FECAL ([id number]) ostomy supplies barrier: Marina 57mm SOFT CONVEX ([id number]). Accessories used: [facility] ostomy accessories: 2\" Cera Barrier Ring ([id number]), Powder ([id number]), and Cavilon no sting barrier film ([id number]). Apply stoma powder to any weepy open areas of skin around stoma.  Brush off all excess powder that does not stick. Apply Cavilon skin prep over powder - let dry before applying new pouch. Pull abdomen flat when applying new pouch (makes the stoma a [place] shape instead of oval). (See photo from 6/23/25 in Media). 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 Follows Dr. Ramirez. Transplant Hepatology was consulted and advised against sirolimus. PTA prednisone 9mg was resumed after weaning off IV hydrocortisone   Plan:  -Continue Tacrolimus Oral suspension 2mg/2mL BID. Must be suspension given unable to tolerate capsule due to allergy to filler--[facility]  -Continue prednisone 9mg daily for now. (5mg with 4 tablets of 1mg=9mg total)  -Continue PTA ursodiol 250 mg BID  -Follow up with transplant team as directed. Scheduled for 8/22/25  -Follow up with nephrology as directed. Scheduled for 7/23/25  -Complete weekly tacrolimus trough level due on Mondays per transplant team. Next due 7/7/25--results pending  -CMP, CBC and CRP due 7/7/25--results pending  -BMP and CBC due 7/14/25     Depression   Protein calorie deficit malnutrition    Anxiety  Comment: Chronic. Appetite fair. Her weights have been stable around 180# since April 2025, however now noted increase in weight up to 190# on 7/1/25. SLUMS 26/30. Increased anxiety around transfers per therapy. "   Plan:  -Monitor mood and behaviors  -Dietician to remain involved to assist with supplemental needs  -Continue chocolate glucerna BID with meals for now--dietician to adjust accordingly  -Monitor for worsening s/symptoms of concerns  -Monitor for changes in mobility, eating and sleeping patterns  -Hydroxyzine PRN x 14 days for anxiety concerns  -Continue zoloft 50mg daily.   -ACP while on TCU  -CMP, CBC and CRP due 7/7/25--results pending  -BMP and CBC due 7/14/25     Mild Obstructive Sleep Apnea   Nocturnal Hypoxemia   Shortness of breath  Acute respiratory failure with hypoxia  Comment: Chronic. Sleep study in 2018 showing mild RASHIDA with recommendation to start CPAP. It does not appear that there was further follow up and not using CPAP.   Plan:  -Monitor sleeping patterns  -Continue oxygen 1-2L via NC to keep sats >88% PRN  -Continue albuterol inhaler every 4 hours PRN. May HUNTER  -Monitor respiratory status  -Follow up with sleep medicine when able  -CMP, CBC and CRP due 7/7/25--results pending  -BMP and CBC due 7/14/25     CKD3b w/ moderate proteinuria, at baseline  HTN  CVD/HLD  Atrial fibrillation with RVR  Comment: Chronic. Baseline Cr. 1.5. History of dialysis at time of liver transplant 23 years ago. Recurrent AKIs and immunosuppressive medication toxicity. Not on SGLT2i 2/2 recurrent UTI. Chart review w/ unclear heart failure history, unconfirmed with EF 60-65% on 3/10/25. BNP 1446 elevated from 1 month ago. Per cardiology on 6/17/25 Loop recording: Patient has been in persistent AF since 6/2/25. She has a history of pAF but this is the first persistent episode logged. She was hospitalized from 5/28-6/12 with septic shock secondary to diverticulitis complicated by abscess which required sigmoidectomy and colostomy. She states that during her hospitalization her telemetry frequently alerted for HRs in the 40's. Her loop recorder is set to alert for HRs < 40bpm... Per ED/recent hospitalization: She is  tachycardic with a heart rate of 120. EKG was done upon arrival and this demonstrates atrial fibrillation with RVR with a rate of 138.  Here in the ED she has had rates ranging from 110s to 140s.  Blood pressure is acceptable at 133/100. Suspect her atrial fibrillation is due to the fact that she has been off of her rate control and antihypertensive medication/beta-blocker since her recent critical illness.  Recent echocardiogram dated 6/4/2025 shows normal EF of 60 to 65%. We did give the patient a dose of diltiazem here in the emergency department followed by a diltiazem drip.  We did establish IV access and we did draw blood for laboratory analysis.  CBC shows a normal white count of 10.5, hemoglobin is 8.5, up from 7.5 from 2 days ago, platelets are 632, this thrombocytosis appears to be new, CMP shows CKD with a creatinine of 1.68, consistent with previous, mild hyperkalemia with a potassium of 5.5, otherwise electrolytes and LFTs appear to be within normal limits, magnesium within normal limits at 2.0, TSH within normal limits at 4.16, troponin is elevated at 54, this does appear to be somewhat above her baseline.  Repeat troponin is flat at 54.    Plan:  -Continue metoprolol succinate 25mg daily. HOLD if SBP<100 and/or HR<55  -Continue metoprolol tartrate TID PRN if HR>110. If used >2 time, advise to send to ED  -Monitor BP and HR  -Continue PTA evolocumab q2week.   -Continue PTA eztimibe 10mg every day  -Continue apixaban 2.5mg BID  -Follow up with cardiology as directed. Scheduled for 7/21/25  -CMP, CBC and CRP due 7/7/25--results pending  -BMP and CBC due 7/14/25         Concern for transient ischemic attack  Comment: Acute. In the morning of 6/4, she stated that she had speech problems in the last couple of days since the surgery. She described that she has trouble getting her words out and that her speech did not feel as fluent as it was previously.  She denied any new numbness, tingling, headache or  changes in vision. CT head was negative for intracranial bleed or other acute process and TTE with bubble study was also negative. It is possible that she may have had a TIA following surgery as anticoagulation had been held. MRI brain was negative for acute ischemic changes and her speech has returned to baseline.   Plan:  -Continue PTA Xarelto   -No need for neuro follow-up   -CMP, CBC and CRP due 7/7/25--results pending  -BMP and CBC due 7/14/25     T2DM  (A1c 6.8% 4/14/25)  Comment: Chronic. Last A1c 6.8% on 4/14/25. She reports having ongoing early AM hypo-glycemia of levels dropping down to mid 60s. Asymptomatic  Plan:  -Continue PTA linagliptin 5mg every day  -Continue PTA gabapentin 300mg at bedtime  -Dietician involved to assist with offering and obtaining bedtime snack to prevent early AM hypoglycemia.   -Blood Glucose monitoring BID. HOLD off on using freestyle phil 2 sensor while on TCU  -Obtain hgb A1c as directed due 7/7/25--results pending  -CMP, CBC and CRP due 7/7/25--results pending  -BMP and CBC due 7/14/25         Acute kidney injury on CKD3, resolved   Bilateral stent placement per urology 5/29  Acute tubular necrosis  Comment: Her baseline creatinine is 1.3-1.6. She had an DERREK in the setting of hypotension, shock, and nephrotoxic meds which resolved after receiving IV fluids and improved PO after surgery.   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 Wound does not look infected. She now report having skin lesion to left anterior forearm. 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 Will defer to dermatology  Plan:  -Monitor for worsening s/symptoms of concerns  -Eucerin to face to moisturize PRN. May keep at bedside and self  apply  -Follow up with dermatology --Scheduled for 7/8/25     chronic anemia  coagulopathy due to cirrhosis and surgical blood loss    thrombocytopenia   Comment: Chronic. Per recent hospitalization-Did not find any signs of overt bleeding and her ostomy output and vitals were stable. Continued her ferous sulfate and folic acid inpatient. She received 1 unit of packed red blood cells on 6/7. Stable at the time of discharge. Now average around 9  Plan:  -Monitor bleeding risks  -Continue PTA ferrous sulfate daily  -Recommend transfusion if hgb <7, low threshold to send to ED for transfusions needs if indicated  -Continue folic acid daily  -CMP, CBC and CRP due 7/7/25--results pending  -BMP and CBC due 7/14/25     Hypothyroidism  Papillary thyroid carcinoma  Chronic. Recent TSH~4.16 on 6/22/25. S/p thyroidectomy 2010  Plan;  -Continue PTA levothyroxine 175mcg qD   -Monitor for worsening s/symptoms of concerns     Chronic Shoulder Pain  Osteoarthritis   Lower back pain  Comment: Chronic. stable  Plan:  -Monitor pain complaints  -Continue diclofenac gel application to painful areas QID  -Continue Tylenol 1000mg BID   -Continue gabapentin 300mg at HS--only able to tolerate liquid given gelatin capsule allergy     Constipation   Nausea  GERD  Comment: Chronic. S/T polypharmacy.   Plan:  -Monitor BM patterns  -Continue TUMS 1000mg every 4 hours PRN  -Continue beneFiber qd    -Zofran PRN     Epistaxis  Comment: Acute on chronic. She reports occasional nose bleed at times. No episodes while on TCU.   Plan:  -Monitor bleeding risks  -Monitor for worsening s/symptoms of concerns  -Afrin BID PRN on TCU if warranted     Vaginal bleeding  Comment: Acute on chronic. Referred to OBGYN. Last menstrual period she reports has been 40 years ago or so. Known family history of endometrial cancer that metastasized to bowel per her reports. She reports her mother had extensive genetic testing for review in EPIC. Reports mother name:  Anne Fracisco  3/9/23 for reference if indicated. Per OBGYN on 25:  If the endometrial stripe is greater than 4 mm or if it is less than 4 mm and she continues to have bleeding, she will need tissue evaluation with either office endometrial biopsy or hysteroscopy with D&C in the operating room. She does not believe she can tolerate an endometrial biopsy in the clinic, so we discussed doing this as an outpatient procedure. No further vaginal since her TCU readmission.   Plan:  -Monitor for worsening s/symptoms of concerns  -Follow up with OBGYN as directed  -CMP, CBC and CRP due 25--results pending  -BMP and CBC due 25     Physical deconditioning  Generalized muscle weakness  History of falls  Comment: Acute on chronic. Known poor history PTA. [facility] given concerns. Per therapy-Patient requires MOdA for bed and UB needs. Maximum assistance for LB needs and toileting. Angel lift for all transfers given weakness. Increased anxiety and fear with transfers since TCU return.   Plan:  -Continue Physical therapy and Occupational therapy  -Recommend cognitive testing on site  -Highly recommend nursing home compliance post TCU     Electronically signed by:  Dr. Manda Flor DNP, APRN, FNP-C, WCS-C, EDS-C     Provider reviewed records from facility, and interpreted most recent imaging/lab work, and vital signs.   Acute and chronic conditions managed by writer. Have been reviewed during today's exam        Sincerely,        Manda Flor, LIZ CNP    Electronically signed

## 2025-07-08 ENCOUNTER — OFFICE VISIT (OUTPATIENT)
Dept: DERMATOLOGY | Facility: CLINIC | Age: 75
End: 2025-07-08
Payer: MEDICARE

## 2025-07-08 DIAGNOSIS — C44.320 SCC (SQUAMOUS CELL CARCINOMA), FACE: Primary | ICD-10-CM

## 2025-07-08 DIAGNOSIS — D49.2 NEOPLASM OF SKIN: ICD-10-CM

## 2025-07-08 PROCEDURE — 88305 TISSUE EXAM BY PATHOLOGIST: CPT | Mod: TC | Performed by: DERMATOLOGY

## 2025-07-08 PROCEDURE — 88305 TISSUE EXAM BY PATHOLOGIST: CPT | Mod: 26 | Performed by: DERMATOLOGY

## 2025-07-08 NOTE — PROGRESS NOTES
[facility] GERIATRICS    Chief Complaint   Patient presents with    RECHECK     HPI:  Luz Thompson is a 75 year old  (1950), who is being seen today for an episodic care visit at: [facility] ([facility])([facility]) [[id number]]. Today's concern is: The primary encounter diagnosis was Anxiety. Diagnoses of Colostomy present (H), Diverticulitis of colon, Chronic shoulder pain, unspecified laterality, Type 2 diabetes mellitus with stage 3b chronic kidney disease, without long-term current use of insulin (H), Atrial fibrillation with RVR (H), Status post liver transplantation (H), Hypothyroidism, unspecified type, Bilateral low back pain without sciatica, unspecified chronicity, Stage 3b chronic kidney disease (H), SOB (shortness of breath), Hx of sepsis, Physical deconditioning, Hypoxia, S/P laparoscopic-assisted sigmoidectomy, Intra-abdominal infection, Hypotension, unspecified hypotension type, Generalized muscle weakness, Vaginal bleeding, Nocturnal hypoxemia, Hypertension goal BP (blood pressure) < 140/80, 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, S/P Mohs surgery for basal cell carcinoma, Chronic anemia, Age-related osteoporosis without current pathological fracture, Hyperlipidemia LDL goal <70, Chronic diastolic heart failure (H), RASHIDA (obstructive sleep apnea), Depression, unspecified depression type, Personal history of fall, Leukocytosis, unspecified type, Epistaxis, Gastroesophageal reflux disease with esophagitis, unspecified whether hemorrhage, Nausea, Slow transit constipation, Papillary thyroid carcinoma (H), Thrombocytopenia, and Sjogren's syndrome, with unspecified organ involvement were also pertinent to this visit.    Met with patient who denies any chest pain, palpitations, shortness of breath, [place], lightheadedness, dizziness, or cough. Denies any abdominal discomfort. Denies N&V. Denies B&B concerns. Denies dysuria or frequency. Denies loose or constipation. Soft  "brown stool present to colostomy today. Appetite good. Sleeping well. No pain complaints.     BP Readings from Last 3 Encounters:   07/09/25 (!) 148/70   07/07/25 139/89   07/02/25 (!) 161/90     Wt Readings from Last 5 Encounters:   07/09/25 80.6 kg (177 lb 9.6 oz)   07/07/25 80.8 kg (178 lb 3.2 oz)   07/02/25 82.1 kg (181 lb)   07/02/25 86.3 kg (190 lb 3.2 oz)   06/30/25 83.4 kg (183 lb 12.8 oz)     Allergies, and PMH/PSH reviewed in EPIC today.  REVIEW OF SYSTEMS:  4 point ROS including Respiratory, CV, GI and , other than that noted in the HPI,  is negative    Objective:   BP (!) 148/70   Pulse 71   Temp 98.1  F (36.7  C)   Resp 17   Ht 1.702 m (5' 7\")   Wt 80.6 kg (177 lb 9.6 oz)   LMP 06/01/1988 (Approximate)   SpO2 95%   BMI 27.82 kg/m    GENERAL APPEARANCE:  Alert, in no distress, oriented, cooperative  ENT:  Mouth and posterior oropharynx normal, moist mucous membranes, [place]  EYES:  EOM, conjunctivae, lids, pupils and irises normal  NECK:  No adenopathy,masses or thyromegaly  RESP:  respiratory effort and palpation of chest normal, lungs clear to auscultation , no respiratory distress  CV:  regular rate and rhythm, no murmur, rub, or gallop, no edema, +2 pedal pulses  ABDOMEN:  normal bowel sounds, soft, nontender, no hepatosplenomegaly or other masses, no guarding or rebound  M/S:   Angel lift for all transfers. Wheelchair bound. Staff to assist for all ADLs, transfers and cares  SKIN:  Inspection of skin and subcutaneous tissue baseline, wound healing well, no signs of infection stable biopsy site to left arm  NEURO:   Cranial nerves 2-12 are normal tested and grossly at patient's baseline, no purposeful movement in upper and lower extremities  PSYCH:  oriented X 3, memory impaired , affect and mood normal    Labs done in SNF are in [place] EPIC. Please refer to them using EPIC/Care Everywhere. and Recent labs in [facility] reviewed by me today.        ASSESSMENT/PLAN:  Diverticulitis complicated by " abscess status post open sigmoidectomy with end colostomy 5/30/25  History of Sepsis due to Klebsiella pneumoniae and Pseudomonas aeruginosa bacteremia 5/28, likely of intra-abdominal origin   Comment: Long multiple hospitalization course. She required a slow steroid taper because of blood pressure dysregulations. Transitioned from meropenem to ceftolozane-tazobactam due to resistance (psuedomonas coverage) and switched vancomycin to linezolid in the setting of acute kidney injury for coverage of previous vancomycin-resistent enterococci (E Facecium). From infectious disease's perspective, there were no good oral antibiotic options and so she completed a 7-day IV antibiotic course at the hospital. There was new cloudy PATY drain output on 6/6 and ongoing leukocytosis but no new localizing symptoms so we continued to monitor and found. She had been seen in colorectal surgery clinic the day prior (5/27) where it was decided that she should undergo open sigmoidectomy with DLI.   Plan:  -Follow up with ID as directed.   -Follow up with Colorectal Surgery as directed. Scheduled for 7/15/25  -Follow up with WOCN visit for new ostomy visit as directed. Scheduled for 7/14/25  -Continue benefiber packet daily per request  -BMP and CBC due 7/14/25  -Continue colostomy care as directed:  *Peristomal wound: With pouch changes - clean open wound to medial side of stoma with wound spray. Cut piece of Hydrofera Blue Classic to size of open area. Wet Hydrofera Blue (#[id number]) with saline and squeeze out excess, place in wound base, apply small bead of ostomy paste to the medial edge of hydrafera blue/Pt's skin to help hold in/seal hydrafera blue, then cover with additional ostomy ring to secure, apply wafer and caulk open creases around barrier ring, then attach pouch   *LLQ Colostomy pouching plan:   Pouching system: ostomy supplies pouches: Marina 57 FECAL ([id number]) ostomy supplies barrier: Omega 57mm SOFT CONVEX ([id number]).  "Accessories used: [facility] ostomy accessories: 2\" Cera Barrier Ring (029552), Powder ([id number]), and Cavilon no sting barrier film ([id number]). Apply stoma powder to any weepy open areas of skin around stoma.  Brush off all excess powder that does not stick. Apply Cavilon skin prep over powder - let dry before applying new pouch. Pull abdomen flat when applying new pouch (makes the stoma a [place] shape instead of oval). (See photo from 6/23/25 in Media). 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 Follows Dr. Ramirez. Transplant Hepatology was consulted and advised against sirolimus. PTA prednisone 9mg was resumed after weaning off IV hydrocortisone   Plan:  -Continue Tacrolimus Oral suspension 2mg/2mL BID. Must be suspension given unable to tolerate capsule due to allergy to filler--to be filled by [facility]  -Continue prednisone 9mg daily for now. (5mg with 4 tablets of 1mg=9mg total)  -Continue PTA ursodiol 250 mg BID  -Follow up with transplant team as directed. Scheduled for 8/22/25  -Follow up with nephrology as directed. Scheduled for 7/23/25  -Complete weekly tacrolimus trough level due on Mondays per transplant team. Recent levels: 2.9 on 7/7  -BMP and CBC due 7/14/25     Depression   Protein calorie deficit malnutrition    Anxiety  Comment: Chronic. Appetite fair. SLUMS 26/30. Increased anxiety around transfers per therapy.   Plan:  -Monitor mood and behaviors  -Dietician to remain involved to assist with supplemental needs  -Continue chocolate glucerna BID with meals for now--dietician to adjust accordingly  -Monitor for worsening s/symptoms of concerns  -Monitor for changes in mobility, eating and sleeping patterns  -Hydroxyzine PRN for anxiety concerns---extend for another 14 days  -Continue zoloft 50mg daily.   -ACP while on TCU  -BMP and CBC due 7/14/25     Mild " Obstructive Sleep Apnea   Nocturnal Hypoxemia   Shortness of breath  Acute respiratory failure with hypoxia  Comment: Chronic. Sleep study in 2018 showing mild RASHIDA with recommendation to start CPAP. It does not appear that there was further follow up and not using CPAP.   Plan:  -Monitor sleeping patterns  -Discontinue oxygen given non use  -Continue albuterol inhaler every 4 hours PRN. May HUNTER  -Monitor respiratory status  -Follow up with sleep medicine when able  -BMP and CBC due 7/14/25     CKD3b w/ moderate proteinuria, at baseline  HTN  CVD/HLD  Atrial fibrillation with RVR  Comment: Chronic. Baseline Cr. 1.5. History of dialysis at time of liver transplant 23 years ago. Recurrent AKIs and immunosuppressive medication toxicity. Not on SGLT2i 2/2 recurrent UTI. Chart review w/ unclear heart failure history, unconfirmed with EF 60-65% on 3/10/25. BNP 1446 elevated from 1 month ago. Per cardiology on 6/17/25 Loop recording: Patient has been in persistent AF since 6/2/25. She has a history of pAF but this is the first persistent episode logged. She was hospitalized from 5/28-6/12 with septic shock secondary to diverticulitis complicated by abscess which required sigmoidectomy and colostomy. She states that during her hospitalization her telemetry frequently alerted for HRs in the 40's. Her loop recorder is set to alert for HRs < 40bpm... Per ED/recent hospitalization: She is tachycardic with a heart rate of 120. EKG was done upon arrival and this demonstrates atrial fibrillation with RVR with a rate of 138.  Here in the ED she has had rates ranging from 110s to 140s.  Blood pressure is acceptable at 133/100. Suspect her atrial fibrillation is due to the fact that she has been off of her rate control and antihypertensive medication/beta-blocker since her recent critical illness.  Recent echocardiogram dated 6/4/2025 shows normal EF of 60 to 65%. We did give the patient a dose of diltiazem here in the emergency  department followed by a diltiazem drip.  We did establish IV access and we did draw blood for laboratory analysis.  CBC shows a normal white count of 10.5, hemoglobin is 8.5, up from 7.5 from 2 days ago, platelets are 632, this thrombocytosis appears to be new, CMP shows CKD with a creatinine of 1.68, consistent with previous, mild hyperkalemia with a potassium of 5.5, otherwise electrolytes and LFTs appear to be within normal limits, magnesium within normal limits at 2.0, TSH within normal limits at 4.16, troponin is elevated at 54, this does appear to be somewhat above her baseline.  Repeat troponin is flat at 54.    Plan:  -Continue metoprolol succinate 25mg daily. HOLD if SBP<100 and/or HR<55  -Continue metoprolol tartrate TID PRN if HR>110. If used >2 time, advise to send to ED  -Monitor BP and HR  -Continue PTA evolocumab q2week.   -Continue PTA eztimibe 10mg every day  -Continue apixaban 2.5mg BID  -Follow up with cardiology as directed. Scheduled for 7/21/25  -BMP and CBC due 7/14/25     Concern for transient ischemic attack  Comment: Acute. In the morning of 6/4, she stated that she had speech problems in the last couple of days since the surgery. She described that she has trouble getting her words out and that her speech did not feel as fluent as it was previously.  She denied any new numbness, tingling, headache or changes in vision. CT head was negative for intracranial bleed or other acute process and TTE with bubble study was also negative. It is possible that she may have had a TIA following surgery as anticoagulation had been held. MRI brain was negative for acute ischemic changes and her speech has returned to baseline.   Plan:  -Continue PTA Xarelto   -No need for neuro follow-up   -BMP and CBC due 7/14/25     T2DM  (A1c 6.8% 4/14/25)  Comment: Chronic. Last A1c 5.8% on 7/7/25.   Plan:  -Continue PTA linagliptin 5mg every day  -Continue PTA gabapentin 300mg at bedtime  -Dietician involved to  assist with offering and obtaining bedtime snack to prevent early AM hypoglycemia.   -Blood Glucose monitoring BID. HOLD off on using freestyle phil 2 sensor while on TCU  -BMP and CBC due 7/14/25    Lab Results   Component Value Date    A1C 5.8 07/07/2025    A1C 6.3 05/29/2025    A1C 6.8 04/14/2025    A1C 6.9 12/10/2024    A1C 6.9 09/09/2024    A1C 6.4 10/16/2018    A1C 6.8 05/18/2018        Acute kidney injury on CKD3, resolved   Bilateral stent placement per urology 5/29  Acute tubular necrosis  Comment: Her baseline creatinine is 1.3-1.6. She had an DERREK in the setting of hypotension, shock, and nephrotoxic meds which resolved after receiving IV fluids and improved PO after surgery.   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 Recent SCC with S/p MOHs procedure to left face. Biopsy completed to left arm lesion on 7/8/25  Plan:  -Monitor for worsening s/symptoms of concerns  -Eucerin to face to moisturize PRN. May keep at bedside and self apply  -Per dermatology: As such, we still recommend complete imaging for staging purposes. In light of her recent bowel surgery, recommend CT w/ contrast of the neck and chest for staging > PET CT at this time. Pending schedule  -Follow up with dermatology as directed  -Continue biopsy site care as directed per dermatology.      chronic anemia  coagulopathy due to cirrhosis and surgical blood loss    thrombocytopenia   Comment: Chronic. Per recent hospitalization-Did not find any signs of overt bleeding and her ostomy output and vitals were stable. Continued her ferous sulfate and folic acid inpatient. She received 1 unit of packed red blood cells on 6/7. Stable at the time of discharge. Now average around 9  Plan:  -Monitor bleeding risks  -Continue PTA ferrous sulfate daily  -Recommend  transfusion if hgb <7, low threshold to send to ED for transfusions needs if indicated  -Continue folic acid daily  -BMP and CBC due 25     Hypothyroidism  Papillary thyroid carcinoma  Chronic. Recent TSH~4.16 on 25. S/p thyroidectomy   Plan;  -Continue PTA levothyroxine 175mcg qD   -Monitor for worsening s/symptoms of concerns     Chronic Shoulder Pain  Osteoarthritis   Lower back pain  Comment: Chronic. stable  Plan:  -Monitor pain complaints  -Continue diclofenac gel application to painful areas QID  -Continue Tylenol 1000mg BID   -Continue gabapentin 300mg at HS--only able to tolerate liquid given gelatin capsule allergy     Constipation   Nausea  GERD  Comment: Chronic. S/T polypharmacy.   Plan:  -Monitor BM patterns  -Continue TUMS 1000mg every 4 hours PRN  -Continue beneFiber qd    -Zofran PRN     Epistaxis  Comment: Acute on chronic. She reports occasional nose bleed at times. No episodes while on TCU.   Plan:  -Monitor bleeding risks  -Monitor for worsening s/symptoms of concerns  -Afrin BID PRN on TCU if warranted     Vaginal bleeding  Comment: Acute on chronic. Referred to OBGYN. Last menstrual period she reports has been 40 years ago or so. Known family history of endometrial cancer that metastasized to bowel per her reports. She reports her mother had extensive genetic testing for review in EPIC. Reports mother name: Anne Yap  3/9/23 for reference if indicated. Per OBGYN on 25:  If the endometrial stripe is greater than 4 mm or if it is less than 4 mm and she continues to have bleeding, she will need tissue evaluation with either office endometrial biopsy or hysteroscopy with D&C in the operating room. She does not believe she can tolerate an endometrial biopsy in the clinic, so we discussed doing this as an outpatient procedure. No further vaginal since her TCU readmission.   Plan:  -Monitor for worsening s/symptoms of concerns  -Follow up with OBGYN as directed  -BMP and  CBC due 7/14/25     Physical deconditioning  Generalized muscle weakness  History of falls  Comment: Acute on chronic. Known poor history PTA. [facility] given concerns. Per therapy-Patient requires MOdA for bed and UB needs. Maximum assistance for LB needs and toileting. Angel lift for all transfers given weakness. Increased anxiety and fear with transfers since TCU return. Projected LCD 7/18 given poor therapy progression  Plan:  -Continue Physical therapy and Occupational therapy  -Recommend cognitive testing on site  -Highly recommend MIKEY compliance post TCU     Electronically signed by:  Dr. Manda Flor DNP, APRN, FNP-C, WCS-C, EDS-C     Provider reviewed records from facility, and interpreted most recent imaging/lab work, and vital signs.   Acute and chronic conditions managed by writer. Have been reviewed during today's exam

## 2025-07-08 NOTE — PROGRESS NOTES
Dermatologic Surgery Post-Op Wound Check     CC: RECHECK (Recheck L cheek, lesion on left forearm)    Dermatology Problem List:  #NUB L forearm, S/p Biopsy performed on 7/8/25, pending results     # History NMSC,  - L cheek, SCC, s/p Mohs 4/8/25     ------ per patient report, tx in [place]  - SCC, R eyebrow, 2024  - SCC, R forehead, 2023  - SCC, L hand, 2021  - SCC, L upper arm, unknown date  # Pruritic papular skin eruption-self-induced, resolved  # Mild atopic dermatitis- hands, thumbs, abdomen, back   - previous: Amlactin, patient discontinued   # Actinic keratoses- right temple, left cheek, s/p LN2  # Onychomycosis-  Oral voriconazole, prescribed by Dr. Montero ()   # Drug hypersensitivity reaction- fluconazole  - residual lesions of abdomen and chest   # Atrophe shanna  # Drug allergies   -s/p intradermal testing to several ABX 8/2019, noncontributory  -plan: oral provocation testing     Subjective: Luz Thompson is a 75 year old female who presents today for follow up. She is s/p Mohs of AJCC T3 and [facility] T2b SCC of L cheek. It was previously recommended she undergo PET CT for further staging. However, patient has not been able to obtain imaging as she has been in and out of the hospital recently with diverticulitis c/b abscess. She is now s/p open sigmoidectomy with end colostomy 5/30/25. Hospital course was further c/b by sepsis with pseudomonas likely from intra-abdominal source. Patient denies feeling any lumps on her skin and denies weight loss.     She also has a spot of concern on her left forearm today. She reports new growth in the last few weeks that is painful and bothersome to her.     Objective: An exam of the areas noted below was performed today   - L  forearm there is a 2 x 2.4 cm pink hyperkeratotic nodule   - Well healed v- to - y surgical scar of L cheek. The surgical site noted above is clean, dry, and intact. There is no surrounding erythema, purulence, or significant tenderness to  palpation. No clinical evidence of infection noted today.   - No cervical or axillary LAD.     Assessment and Plan:     1. L cheek, SCC, s/p Mohs 4/8/25 with V-Y repair   - Discussed with patient tumor is high-risk being AJCC stage T3 and [facility] stage T2b. As such, we still recommend complete imaging for staging purposes. In light of her recent bowel surgery, recommend CT w/ contrast of the neck and chest for staging > PET CT at this time.   - Pt reports she is currently wheel-chair bound and is unable to transfer at this time. We recommended she schedule imaging for 4-6 weeks from now when she is able to transfer to CT machine.    2. Neoplasm of unspecified behavior of the skin (D49.2) on the L forearm. The differential diagnosis includes SCC.   - Shave biopsy performed today, see procedure note below.  - Photographed today       Procedure: Shave biopsy  After discussion of risks (including but not limited to bleeding/bruising, pain/swelling, infection, scar, incomplete removal, nerve damage/numbness, recurrence, and non-diagnostic biopsy), benefits and alternatives, informed verbal consent was obtained for biopsy. The area was cleaned with isopropyl alcohol and anesthetized. Shave biopsy was performed on the L forearm. Hemostasis was achieved with electrocautery. Vaseline and a sterile dressing were applied. The patient tolerated the procedure and no complications were noted. The patient was provided with verbal and written post care instructions.      Patient was discussed with and evaluated by attending physician Dr. Luciano May.     Scribe Disclosure:   I, JUSTIN TADEO, am serving as a scribe; to document services personally performed by Luciano May MD -based on data collection and the provider's statements to me.     Karin Del Cid MD   Mohs Surgery and Dermatologic Oncology Fellow    Staff Physician Comments:   I saw and evaluated the patient with the Mohs Surgery Fellow (Dr. Karin Del Cid) and I agree  with the assessment and plan and the above description of the procedure as documented by the scribe. I was present for the entire procedure and entire exam.    Luciano May DO    Department of Dermatology  [facility]: Phone: [phone], Fax:[phone]  [facility]: Phone: [phone], Fax: [phone]

## 2025-07-08 NOTE — NURSING NOTE
Chief Complaint   Patient presents with    RECHECK     Recheck L cheek, lesion on left forearm     NENO Adan-BSN  Dermatology Surgery

## 2025-07-08 NOTE — PATIENT INSTRUCTIONS
Wound Care After a Biopsy    What is a skin biopsy?  A skin biopsy allows the doctor to examine a very small piece of tissue under the microscope to determine the diagnosis and the best treatment for the skin condition. A local anesthetic (numbing medicine)  is injected with a very small needle into the skin area to be tested. A small piece of skin is taken from the area. Sometimes a suture (stitch) is used.     What are the risks of a skin biopsy?  I will experience scar, bleeding, swelling, pain, crusting and redness. I may experience incomplete removal or recurrence. Risks of this procedure are excessive bleeding, bruising, infection, nerve damage, numbness, thick (hypertrophic or keloidal) scar and non-diagnostic biopsy.    How should I care for my wound for the first 24 hours?  Keep the wound dry and covered for 24 hours  If it bleeds, hold direct pressure on the area for 15 minutes. If bleeding does not stop then go to the emergency room  Avoid strenuous exercise the first 1-2 days or as your doctor instructs you    How should I care for the wound after 24 hours?  After 24 hours, remove the bandage  You may bathe or shower as normal  If you had a scalp biopsy, you can shampoo as usual and can use shower water to clean the biopsy site daily  Clean the wound twice a day with gentle soap and water  Do not scrub, be gentle  Apply white petroleum/Vaseline after cleaning the wound with a cotton swab or a clean finger, and keep the site covered with a Bandaid /bandage. Bandages are not necessary with a scalp biopsy  If you are unable to cover the site with a Bandaid /bandage, re-apply ointment 2-3 times a day to keep the site moist. Moisture will help with healing  Avoid strenuous activity for first 1-2 days  Avoid lakes, rivers, pools, and oceans until the stitches are removed or the site is healed    How do I clean my wound?  Wash hands thoroughly with soap or use hand  before all wound care  Clean the  wound with gentle soap and water  Apply white petroleum/Vaseline  to wound after it is clean  Replace the Bandaid /bandage to keep the wound covered for the first few days or as instructed by your doctor  If you had a scalp biopsy, warm shower water to the area on a daily basis should suffice    What should I use to clean my wound?   Cotton-tipped applicators (Qtips )  White petroleum jelly (Vaseline ). Use a clean new container and use Q-tips to apply.  Bandaids   as needed  Gentle soap     How should I care for my wound long term?  Do not get your wound dirty  Keep up with wound care for one week or until the area is healed.  A small scab will form and fall off by itself when the area is completely healed. The area will be red and will become pink in color as it heals. Sun protection is very important for how your scar will turn out. Sunscreen with an SPF 30 or greater is recommended once the area is healed.  If you have stitches, stitches need to be removed in 10 days. You may return to our clinic for this or you may have it done locally at your doctor s office.  You should have some soreness but it should be mild and slowly go away over several days. Talk to your doctor about using tylenol for pain,    When should I call my doctor?  If you have increased:   Pain or swelling  Pus or drainage (clear or slightly yellow drainage is ok)  Temperature over 100F  Spreading redness or warmth around wound    When will I hear about my results?  The biopsy results can take 2-3 weeks to come back. The clinic will call you with the results, send you a Net[employer]li[employer] message, or have you schedule a follow-up clinic or phone time to discuss the results. Contact our clinics if you do not hear from us in 3 weeks.     Who should I call with questions?  [facility]: [phone]   [facility]: [phone]  For urgent needs outside of business hours call the [facility]  at [phone] and ask for the dermatology resident on call      Patient Education       Proper skin care from [facility]:    -Eliminate harsh soaps as they strip the natural oils from the skin, often resulting in dry itchy skin ( i.e. Dial, Zest, Daphnie Spring)  -Use mild soaps such as Cetaphil or Dove Sensitive Skin in the shower. You do not need to use soap on arms, legs, and trunk every time you shower unless visibly soiled.   -Avoid hot or cold showers.  -After showering, lightly dry off and apply moisturizing within 2-3 minutes. This will help trap moisture in the skin.   -Aggressive use of a moisturizer at least 1-2 times a day to the entire body (including -Vanicream, Cetaphil, Aquaphor or Cerave) and moisturize hands after every washing.  -We recommend using moisturizers that come in a tub that needs to be scooped out, not a pump. This has more of an oil base. It will hold moisture in your skin much better than a water base moisturizer. The above recommended are non-pore clogging.      Wear a sunscreen with at least SPF 30 on your face, ears, neck and V of the chest daily. Wear sunscreen on other areas of the body if those areas are exposed to the sun throughout the day. Sunscreens can contain physical and/or chemical blockers. Physical blockers are less likely to clog pores, these include zinc oxide and titanium dioxide. Reapply every two hour and after swimming.     Sunscreen examples: https://www.ewg.org/sunscreen/    UV radiation  UVA radiation remains constant throughout the day and throughout the year. It is a longer wavelength than UVB and therefore penetrates deeper into the skin leading to immediate and delayed tanning, photoaging, and skin cancer. 70-80% of UVA and UVB radiation occurs between the hours of 10am-2pm.  UVB radiation  UVB radiation causes the most harmful effects and is more significant during the summer months. However, snow and ice can reflect UVB radiation leading to  skin damage during the winter months as well. UVB radiation is responsible for tanning, burning, inflammation, delayed erythema (pinkness), pigmentation (brown spots), and skin cancer.     I recommend self monthly full body exams and yearly full body exams with a dermatology provider. If you develop a new or changing lesion please follow up for examination. Most skin cancers are pink and scaly or pink and pearly. However, we do see blue/brown/black skin cancers.  Consider the ABCDEs of melanoma when giving yourself your monthly full body exam ( don't forget the groin, buttocks, feet, toes, etc). A-asymmetry, B-borders, C-color, D-diameter, E-elevation or evolving. If you see any of these changes please follow up in clinic. If you cannot see your back I recommend purchasing a hand held mirror to use with a larger wall mirror.       Checking for Skin Cancer  You can find cancer early by checking your skin each month. There are 3 kinds of skin cancer. They are melanoma, basal cell carcinoma, and squamous cell carcinoma. Doing monthly skin checks is the best way to find new marks or skin changes. Follow the instructions below for checking your skin.   The ABCDEs of checking moles for melanoma   Check your moles or growths for signs of melanoma using ABCDE:   Asymmetry: the sides of the mole or growth don t match  Border: the edges are ragged, notched, or blurred  Color: the color within the mole or growth varies  Diameter: the mole or growth is larger than 6 mm (size of a pencil eraser)  Evolving: the size, shape, or color of the mole or growth is changing (evolving is not shown in the images below)    Checking for other types of skin cancer  Basal cell carcinoma or squamous cell carcinoma have symptoms such as:     A spot or mole that looks different from all other marks on your skin  Changes in how an area feels, such as itching, tenderness, or pain  Changes in the skin's surface, such as oozing, bleeding, or  scaliness  A sore that does not heal  New swelling or redness beyond the border of a mole    Who s at risk?  Anyone can get skin cancer. But you are at greater risk if you have:   Fair skin, light-colored hair, or light-colored eyes  Many moles or abnormal moles on your skin  A history of sunburns from sunlight or tanning beds  A family history of skin cancer  A history of exposure to radiation or chemicals  A weakened immune system  If you have had skin cancer in the past, you are at risk for recurring skin cancer.   How to check your skin  Do your monthly skin checkups in front of a full-length mirror. Check all parts of your body, including your:   Head (ears, face, neck, and scalp)  Torso (front, back, and sides)  Arms (tops, undersides, upper, and lower armpits)  Hands (palms, backs, and fingers, including under the nails)  Buttocks and genitals  Legs (front, back, and sides)  Feet (tops, soles, toes, including under the nails, and between toes)  If you have a lot of moles, take digital photos of them each month. Make sure to take photos both up close and from a distance. These can help you see if any moles change over time.   Most skin changes are not cancer. But if you see any changes in your skin, call your doctor right away. Only he or she can diagnose a problem. If you have skin cancer, seeing your doctor can be the first step toward getting the treatment that could save your life.   [employer] last reviewed this educational content on 4/1/2019 2000-2020 [employer]. [address] [place] [address], [place] [address]. All rights reserved. This information is not intended as a substitute for professional medical care. Always follow your healthcare professional's instructions.

## 2025-07-08 NOTE — LETTER
7/8/2025       RE: Luz Thompson  [address]  [place] [address]     Dear Colleague,    Thank you for referring your patient, Luz Thompson, to the [facility] [place] at [facility]. Please see a copy of my visit note below.    Dermatologic Surgery Post-Op Wound Check     CC: RECHECK (Recheck L cheek, lesion on left forearm)    Dermatology Problem List:  #NUB L forearm, S/p Biopsy performed on 7/8/25, pending results     # History [facility],  - L cheek, SCC, s/p Mohs 4/8/25     ------ per patient report, tx in [place]  - SCC, R eyebrow, 2024  - SCC, R forehead, 2023  - SCC, L hand, 2021  - SCC, L upper arm, unknown date  # Pruritic papular skin eruption-self-induced, resolved  # Mild atopic dermatitis- hands, thumbs, abdomen, back   - previous: Amlactin, patient discontinued   # Actinic keratoses- right temple, left cheek, s/p LN2  # Onychomycosis-  Oral voriconazole, prescribed by Dr. Montero ([facility])   # Drug hypersensitivity reaction- fluconazole  - residual lesions of abdomen and chest   # Atrophe shanna  # Drug allergies   -s/p intradermal testing to several ABX 8/2019, noncontributory  -plan: oral provocation testing     Subjective: Luz Thompson is a 75 year old female who presents today for follow up. 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 It was previously recommended she undergo PET CT for further staging. However, patient has not been able to obtain imaging as she has been in and out of the hospital recently with diverticulitis c/b abscess. She is now s/p open sigmoidectomy with end colostomy 5/30/25. Hospital course was further c/b by sepsis with pseudomonas likely from intra-abdominal source. Patient denies feeling any lumps on her skin and denies weight loss.     She also has a spot of concern on her left forearm today. She reports new growth in the last few weeks that is painful and  bothersome to her.     Objective: An exam of the areas noted below was performed today   - L  forearm there is a 2 x 2.4 cm pink hyperkeratotic nodule   - Well healed v- to - y surgical scar of L cheek. The surgical site noted above is clean, dry, and intact. There is no surrounding erythema, purulence, or significant tenderness to palpation. No clinical evidence of infection noted today.   - No cervical or axillary LAD.     Assessment and Plan:     1. L cheek, SCC, s/p Mohs 4/8/25 with V-Y repair   - Discussed with patient tumor is high-risk being AJCC stage T3 and [facility] stage T2b. As such, we still recommend complete imaging for staging purposes. In light of her recent bowel surgery, recommend CT w/ contrast of the neck and chest for staging > PET CT at this time.   - Pt reports she is currently wheel-chair bound and is unable to transfer at this time. We recommended she schedule imaging for 4-6 weeks from now when she is able to transfer to CT machine.    2. Neoplasm of unspecified behavior of the skin (D49.2) on the L forearm. The differential diagnosis includes SCC.   - Shave biopsy performed today, see procedure note below.  - Photographed today       Procedure: Shave biopsy  After discussion of risks (including but not limited to bleeding/bruising, pain/swelling, infection, scar, incomplete removal, nerve damage/numbness, recurrence, and non-diagnostic biopsy), benefits and alternatives, informed verbal consent was obtained for biopsy. The area was cleaned with isopropyl alcohol and anesthetized. Shave biopsy was performed on the L forearm. Hemostasis was achieved with electrocautery. Vaseline and a sterile dressing were applied. The patient tolerated the procedure and no complications were noted. The patient was provided with verbal and written post care instructions.      Patient was discussed with and evaluated by attending physician Dr. Luciano May.     Scribe Disclosure:   ALAN, JUSTIN TADEO, am serving  as a scribe; to document services personally performed by Luciano May MD -based on data collection and the provider's statements to me.     Karin Del Cid MD   Mohs Surgery and Dermatologic Oncology Fellow    Staff Physician Comments:   I saw and evaluated the patient with the Mohs Surgery Fellow (Dr. Karin Del Cid) and I agree with the assessment and plan and the above description of the procedure as documented by the scribe. I was present for the entire procedure and entire exam.    Luciano May DO    Department of Dermatology  [facility]: Phone: [phone], Fax:[phone]  [facility]: Phone: [phone], Fax: [phone]             Again, thank you for allowing me to participate in the care of your patient.      Sincerely,    Luciano May MD

## 2025-07-09 ENCOUNTER — TRANSITIONAL CARE UNIT VISIT (OUTPATIENT)
Dept: GERIATRICS | Facility: CLINIC | Age: 75
End: 2025-07-09
Payer: MEDICARE

## 2025-07-09 VITALS
SYSTOLIC BLOOD PRESSURE: 148 MMHG | HEART RATE: 71 BPM | TEMPERATURE: 98.1 F | DIASTOLIC BLOOD PRESSURE: 70 MMHG | WEIGHT: 177.6 LBS | RESPIRATION RATE: 17 BRPM | HEIGHT: 67 IN | BODY MASS INDEX: 27.88 KG/M2 | OXYGEN SATURATION: 95 %

## 2025-07-09 DIAGNOSIS — R04.0 EPISTAXIS: ICD-10-CM

## 2025-07-09 DIAGNOSIS — G47.34 NOCTURNAL HYPOXEMIA: ICD-10-CM

## 2025-07-09 DIAGNOSIS — Z86.19 HX OF SEPSIS: ICD-10-CM

## 2025-07-09 DIAGNOSIS — G89.29 CHRONIC SHOULDER PAIN, UNSPECIFIED LATERALITY: ICD-10-CM

## 2025-07-09 DIAGNOSIS — C44.329 SQUAMOUS CELL CANCER OF SKIN OF LEFT CHEEK: ICD-10-CM

## 2025-07-09 DIAGNOSIS — N18.32 TYPE 2 DIABETES MELLITUS WITH STAGE 3B CHRONIC KIDNEY DISEASE, WITHOUT LONG-TERM CURRENT USE OF INSULIN (H): ICD-10-CM

## 2025-07-09 DIAGNOSIS — K21.00 GASTROESOPHAGEAL REFLUX DISEASE WITH ESOPHAGITIS, UNSPECIFIED WHETHER HEMORRHAGE: ICD-10-CM

## 2025-07-09 DIAGNOSIS — R09.02 HYPOXIA: ICD-10-CM

## 2025-07-09 DIAGNOSIS — K59.01 SLOW TRANSIT CONSTIPATION: ICD-10-CM

## 2025-07-09 DIAGNOSIS — R06.02 SOB (SHORTNESS OF BREATH): ICD-10-CM

## 2025-07-09 DIAGNOSIS — B99.9 INTRA-ABDOMINAL INFECTION: ICD-10-CM

## 2025-07-09 DIAGNOSIS — Z94.4 STATUS POST LIVER TRANSPLANTATION (H): ICD-10-CM

## 2025-07-09 DIAGNOSIS — Z85.828 S/P MOHS SURGERY FOR BASAL CELL CARCINOMA: ICD-10-CM

## 2025-07-09 DIAGNOSIS — Z93.3 COLOSTOMY PRESENT (H): ICD-10-CM

## 2025-07-09 DIAGNOSIS — I10 HYPERTENSION GOAL BP (BLOOD PRESSURE) < 140/80: ICD-10-CM

## 2025-07-09 DIAGNOSIS — I95.9 HYPOTENSION, UNSPECIFIED HYPOTENSION TYPE: ICD-10-CM

## 2025-07-09 DIAGNOSIS — M35.00 SJOGREN'S SYNDROME, WITH UNSPECIFIED ORGAN INVOLVEMENT: ICD-10-CM

## 2025-07-09 DIAGNOSIS — E03.9 HYPOTHYROIDISM, UNSPECIFIED TYPE: ICD-10-CM

## 2025-07-09 DIAGNOSIS — M25.519 CHRONIC SHOULDER PAIN, UNSPECIFIED LATERALITY: ICD-10-CM

## 2025-07-09 DIAGNOSIS — K57.32 DIVERTICULITIS OF COLON: ICD-10-CM

## 2025-07-09 DIAGNOSIS — Z90.49 S/P LAPAROSCOPIC-ASSISTED SIGMOIDECTOMY: ICD-10-CM

## 2025-07-09 DIAGNOSIS — D69.6 THROMBOCYTOPENIA: ICD-10-CM

## 2025-07-09 DIAGNOSIS — E11.22 TYPE 2 DIABETES MELLITUS WITH STAGE 3B CHRONIC KIDNEY DISEASE, WITHOUT LONG-TERM CURRENT USE OF INSULIN (H): ICD-10-CM

## 2025-07-09 DIAGNOSIS — N93.9 VAGINAL BLEEDING: ICD-10-CM

## 2025-07-09 DIAGNOSIS — R53.81 PHYSICAL DECONDITIONING: ICD-10-CM

## 2025-07-09 DIAGNOSIS — Z91.81 PERSONAL HISTORY OF FALL: ICD-10-CM

## 2025-07-09 DIAGNOSIS — M62.81 GENERALIZED MUSCLE WEAKNESS: ICD-10-CM

## 2025-07-09 DIAGNOSIS — N18.32 STAGE 3B CHRONIC KIDNEY DISEASE (H): ICD-10-CM

## 2025-07-09 DIAGNOSIS — E78.5 HYPERLIPIDEMIA LDL GOAL <70: ICD-10-CM

## 2025-07-09 DIAGNOSIS — D72.829 LEUKOCYTOSIS, UNSPECIFIED TYPE: ICD-10-CM

## 2025-07-09 DIAGNOSIS — R11.0 NAUSEA: ICD-10-CM

## 2025-07-09 DIAGNOSIS — G47.33 OSA (OBSTRUCTIVE SLEEP APNEA): ICD-10-CM

## 2025-07-09 DIAGNOSIS — I50.32 CHRONIC DIASTOLIC HEART FAILURE (H): ICD-10-CM

## 2025-07-09 DIAGNOSIS — C73 PAPILLARY THYROID CARCINOMA (H): ICD-10-CM

## 2025-07-09 DIAGNOSIS — M81.0 AGE-RELATED OSTEOPOROSIS WITHOUT CURRENT PATHOLOGICAL FRACTURE: ICD-10-CM

## 2025-07-09 DIAGNOSIS — D64.9 CHRONIC ANEMIA: ICD-10-CM

## 2025-07-09 DIAGNOSIS — M54.50 BILATERAL LOW BACK PAIN WITHOUT SCIATICA, UNSPECIFIED CHRONICITY: ICD-10-CM

## 2025-07-09 DIAGNOSIS — I48.91 ATRIAL FIBRILLATION WITH RVR (H): ICD-10-CM

## 2025-07-09 DIAGNOSIS — Z98.890 S/P MOHS SURGERY FOR BASAL CELL CARCINOMA: ICD-10-CM

## 2025-07-09 DIAGNOSIS — F32.A DEPRESSION, UNSPECIFIED DEPRESSION TYPE: ICD-10-CM

## 2025-07-09 DIAGNOSIS — F41.9 ANXIETY: Primary | ICD-10-CM

## 2025-07-09 PROCEDURE — 99309 SBSQ NF CARE MODERATE MDM 30: CPT | Performed by: NURSE PRACTITIONER

## 2025-07-09 NOTE — PROGRESS NOTES
[facility] GERIATRICS    Chief Complaint   Patient presents with    RECHECK     HPI:  Luz Thompson is a 75 year old  (1950), who is being seen today for an episodic care visit at: [facility] ([facility])([facility]) [[id number]]. Today's concern is: The primary encounter diagnosis was Anxiety. Diagnoses of Colostomy present (H), Diverticulitis of colon, Chronic shoulder pain, unspecified laterality, Type 2 diabetes mellitus with stage 3b chronic kidney disease, without long-term current use of insulin (H), Atrial fibrillation with RVR (H), Status post liver transplantation (H), Hypothyroidism, unspecified type, Bilateral low back pain without sciatica, unspecified chronicity, Stage 3b chronic kidney disease (H), SOB (shortness of breath), Hx of sepsis, Vaginal bleeding, Physical deconditioning, Hypoxia, Intra-abdominal infection, S/P laparoscopic-assisted sigmoidectomy, Nocturnal hypoxemia, Hypotension, unspecified hypotension type, Generalized muscle weakness, Hypertension goal BP (blood pressure) < 140/80, 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, S/P Mohs surgery for basal cell carcinoma, Chronic anemia, Age-related osteoporosis without current pathological fracture, Hyperlipidemia LDL goal <70, Chronic diastolic heart failure (H), RASHIDA (obstructive sleep apnea), Depression, unspecified depression type, Personal history of fall, Leukocytosis, unspecified type, Epistaxis, Gastroesophageal reflux disease with esophagitis, unspecified whether hemorrhage, Nausea, Slow transit constipation, Papillary thyroid carcinoma (H), Thrombocytopenia, Squamous cell carcinoma of skin of left upper extremity, including shoulder, Acute tubular necrosis, DERREK (acute kidney injury), TIA (transient ischemic attack), Protein-calorie malnutrition, unspecified severity, and Sjogren's syndrome, with unspecified organ involvement were also pertinent to this visit.    ***    BP Readings from Last 3 Encounters:  "  07/10/25 (!) 160/80   07/09/25 (!) 148/70   07/07/25 139/89     Wt Readings from Last 5 Encounters:   07/10/25 80.7 kg (178 lb)   07/09/25 80.6 kg (177 lb 9.6 oz)   07/07/25 80.8 kg (178 lb 3.2 oz)   07/02/25 82.1 kg (181 lb)   07/02/25 86.3 kg (190 lb 3.2 oz)     Allergies, and PMH/PSH reviewed in EPIC today.  REVIEW OF SYSTEMS:  4 point ROS including Respiratory, CV, GI and , other than that noted in the HPI,  is negative    Objective:   BP (!) 160/80   Pulse 84   Temp 98  F (36.7  C)   Resp 18   Ht 1.715 m (5' 7.5\")   Wt 80.7 kg (178 lb)   LMP 06/01/1988 (Approximate)   SpO2 94%   BMI 27.47 kg/m    GENERAL APPEARANCE:  {[facility] GENERAL APPEARANCE:928893}  ENT:  {[facility] ENT PE:[id number]::\"Mouth and posterior oropharynx normal, moist mucous membranes\"}  EYES:  {[facility] EYES PE :[id number]::\"EOM, conjunctivae, lids, pupils and irises normal\"}  NECK:  {[facility] NECK PE:731137::\"No adenopathy,masses or thyromegaly\"}  RESP:  {[facility] RESP PE:425682}  CV:  {[facility] CV PE:950239}  ABDOMEN:  {[facility] GI PE:603095::\"normal bowel sounds, soft, nontender, no hepatosplenomegaly or other masses\"}  M/S:   {[facility] M/S PE:568887}  SKIN:  {[facility] SKIN PE:927469}  NEURO:   {[facility] NEURO PE:619790}  PSYCH:  {[facility] PSYCH PE:286253}    Labs done in SNF are in [place] EPIC. Please refer to them using EPIC/Care Everywhere. and Recent labs in EPIC reviewed by me today.     ASSESSMENT/PLAN:  Diverticulitis complicated by abscess status post open sigmoidectomy with end colostomy 5/30/25  History of Sepsis due to Klebsiella pneumoniae and Pseudomonas aeruginosa bacteremia 5/28, likely of intra-abdominal origin   Comment: Long multiple hospitalization course. She required a slow steroid taper because of blood pressure dysregulations. Transitioned from meropenem to ceftolozane-tazobactam due to resistance (psuedomonas coverage) and switched vancomycin to linezolid in the " "setting of acute kidney injury for coverage of previous vancomycin-resistent enterococci (E Facecium). From infectious disease's perspective, there were no good oral antibiotic options and so she completed a 7-day IV antibiotic course at the hospital. There was new cloudy PATY drain output on 6/6 and ongoing leukocytosis but no new localizing symptoms so we continued to monitor and found. She had been seen in colorectal surgery clinic the day prior (5/27) where it was decided that she should undergo open sigmoidectomy with DLI.   Plan:  -Follow up with ID as directed.   -Follow up with Colorectal Surgery as directed. Scheduled for 7/15/25  -Follow up with WOCN visit for new ostomy visit as directed. Scheduled for 7/14/25  -Continue benefiber packet daily per request  -BMP and CBC due 7/14/25  -Start Senna S 1 tab BID scheduled given stools hard/formed which causes colostomy pouch to come loose.   -Continue colostomy care as directed:  *Peristomal wound: With pouch changes - clean open wound to medial side of stoma with wound spray. Cut piece of Hydrofera Blue Classic to size of open area. Wet Hydrofera Blue (#[id number]) with saline and squeeze out excess, place in wound base, apply small bead of ostomy paste to the medial edge of hydrafera blue/Pt's skin to help hold in/seal hydrafera blue, then cover with additional ostomy ring to secure, apply wafer and caulk open creases around barrier ring, then attach pouch   *LLQ Colostomy pouching plan:   Pouching system: ostomy supplies pouches: [place] 57 FECAL ([id number]) ostomy supplies barrier: [place] 57mm SOFT CONVEX ([id number]). Accessories used: [facility] ostomy accessories: 2\" Cera Barrier Ring ([id number]), Powder ([id number]), and Cavilon no sting barrier film ([id number]). Apply stoma powder to any weepy open areas of skin around stoma.  Brush off all excess powder that does not stick. Apply Cavilon skin prep over powder - let dry before applying new pouch. Pull abdomen flat when " applying new pouch (makes the stoma a [place]une shape instead of oval). (See photo from 6/23/25 in Media). 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 Follows Dr. Ramirez. Transplant Hepatology was consulted and advised against sirolimus. PTA prednisone 9mg was resumed after weaning off IV hydrocortisone   Plan:  -Continue Tacrolimus Oral suspension 2mg/2mL BID. Must be suspension given unable to tolerate capsule due to allergy to filler--to be filled by [facility]  -Continue prednisone 9mg daily for now. (5mg with 4 tablets of 1mg=9mg total)  -Continue PTA ursodiol 250 mg BID  -Follow up with transplant team as directed. Scheduled for 8/22/25  -Follow up with nephrology as directed. Scheduled for 7/23/25  -Complete weekly tacrolimus trough level due on Mondays per transplant team. Recent levels: 2.9 on 7/7  -BMP and CBC due 7/14/25     Depression   Protein calorie deficit malnutrition    Anxiety  Comment: Chronic. Appetite fair. SLUMS 26/30. Increased anxiety around transfers per therapy.   Plan:  -Monitor mood and behaviors  -Dietician to remain involved to assist with supplemental needs  -Continue chocolate glucerna BID with meals for now--dietician to adjust accordingly  -Monitor for worsening s/symptoms of concerns  -Monitor for changes in mobility, eating and sleeping patterns  -Hydroxyzine PRN for anxiety concerns  -Continue zoloft 50mg daily.   -ACP while on TCU  -BMP and CBC due 7/14/25     Mild Obstructive Sleep Apnea   Nocturnal Hypoxemia   Shortness of breath  Acute respiratory failure with hypoxia  Comment: Chronic. Sleep study in 2018 showing mild RASHIDA with recommendation to start CPAP. It does not appear that there was further follow up and not using CPAP.   Plan:  -Monitor sleeping patterns  -Continue albuterol inhaler every 4 hours PRN. May HUNTER  -Monitor  respiratory status  -Follow up with sleep medicine when able  -BMP and CBC due 7/14/25     CKD3b w/ moderate proteinuria, at baseline  HTN  CVD/HLD  Atrial fibrillation with RVR  Comment: Chronic. Baseline Cr. 1.5. History of dialysis at time of liver transplant 23 years ago. Recurrent AKIs and immunosuppressive medication toxicity. Not on SGLT2i 2/2 recurrent UTI. Chart review w/ unclear heart failure history, unconfirmed with EF 60-65% on 3/10/25. BNP 1446 elevated from 1 month ago. Per cardiology on 6/17/25 Loop recording: Patient has been in persistent AF since 6/2/25. She has a history of pAF but this is the first persistent episode logged. She was hospitalized from 5/28-6/12 with septic shock secondary to diverticulitis complicated by abscess which required sigmoidectomy and colostomy. She states that during her hospitalization her telemetry frequently alerted for HRs in the 40's. Her loop recorder is set to alert for HRs < 40bpm... Per ED/recent hospitalization: She is tachycardic with a heart rate of 120. EKG was done upon arrival and this demonstrates atrial fibrillation with RVR with a rate of 138.  Here in the ED she has had rates ranging from 110s to 140s.  Blood pressure is acceptable at 133/100. Suspect her atrial fibrillation is due to the fact that she has been off of her rate control and antihypertensive medication/beta-blocker since her recent critical illness.  Recent echocardiogram dated 6/4/2025 shows normal EF of 60 to 65%. We did give the patient a dose of diltiazem here in the emergency department followed by a diltiazem drip.  We did establish IV access and we did draw blood for laboratory analysis.  CBC shows a normal white count of 10.5, hemoglobin is 8.5, up from 7.5 from 2 days ago, platelets are 632, this thrombocytosis appears to be new, CMP shows CKD with a creatinine of 1.68, consistent with previous, mild hyperkalemia with a potassium of 5.5, otherwise electrolytes and LFTs appear  to be within normal limits, magnesium within normal limits at 2.0, TSH within normal limits at 4.16, troponin is elevated at 54, this does appear to be somewhat above her baseline.  Repeat troponin is flat at 54.    Plan:  -Continue metoprolol succinate 25mg daily. HOLD if SBP<100 and/or HR<55  -Continue metoprolol tartrate TID PRN if HR>110. If used >2 time, advise to send to ED  -Monitor BP and HR  -Continue PTA evolocumab q2week.   -Continue PTA eztimibe 10mg every day  -Continue apixaban 2.5mg BID  -Follow up with cardiology as directed. Scheduled for 7/21/25  -BMP and CBC due 7/14/25     Concern for transient ischemic attack  Comment: Acute. In the morning of 6/4, she stated that she had speech problems in the last couple of days since the surgery. She described that she has trouble getting her words out and that her speech did not feel as fluent as it was previously.  She denied any new numbness, tingling, headache or changes in vision. CT head was negative for intracranial bleed or other acute process and TTE with bubble study was also negative. It is possible that she may have had a TIA following surgery as anticoagulation had been held. MRI brain was negative for acute ischemic changes and her speech has returned to baseline.   Plan:  -Continue PTA Xarelto   -No need for neuro follow-up   -BMP and CBC due 7/14/25     T2DM  (A1c 6.8% 4/14/25)  Comment: Chronic. Last A1c 5.8% on 7/7/25.   Plan:  -Continue PTA linagliptin 5mg every day  -Continue PTA gabapentin 300mg at bedtime  -Dietician involved to assist with offering and obtaining bedtime snack to prevent early AM hypoglycemia.   -Blood Glucose monitoring BID. HOLD off on using freestyle phil 2 sensor while on TCU  -BMP and CBC due 7/14/25      Acute kidney injury on CKD3, resolved   Bilateral stent placement per urology 5/29  Acute tubular necrosis  Comment: Her baseline creatinine is 1.3-1.6. She had an DERREK in the setting of hypotension, shock, and  nephrotoxic meds which resolved after receiving IV fluids and improved PO after surgery.   Plan:  -Monitor urinary status  -Urology as directed  -BMP and CBC due 7/14/25     L cheek SCC s/p MOHS 4/8/25  SCC of skin to left upper extremity  Comment: Tumor debulk with perineural invasion w/ pending Tumor Board for consideration of radiation therapy. Recent SCC with S/p MOHs procedure to left face. Biopsy completed to left arm lesion on 7/8/25 which confirmed SCC.   Plan:  -Monitor for worsening s/symptoms of concerns  -Eucerin to face to moisturize PRN. May keep at bedside and self apply  -Per dermatology: As such, we still recommend complete imaging for staging purposes. In light of her recent bowel surgery, recommend CT w/ contrast of the neck and chest for staging > PET CT at this time. Pending schedule  -Follow up with dermatology as directed  -Continue biopsy site care as directed per dermatology.      chronic anemia  coagulopathy due to cirrhosis and surgical blood loss    thrombocytopenia   Comment: Chronic. Per recent hospitalization-Did not find any signs of overt bleeding and her ostomy output and vitals were stable. Continued her ferous sulfate and folic acid inpatient. She received 1 unit of packed red blood cells on 6/7. Stable at the time of discharge. Now average around 9  Plan:  -Monitor bleeding risks  -Continue PTA ferrous sulfate daily  -Recommend transfusion if hgb <7, low threshold to send to ED for transfusions needs if indicated  -Continue folic acid daily  -BMP and CBC due 7/14/25     Hypothyroidism  Papillary thyroid carcinoma  Chronic. Recent TSH~4.16 on 6/22/25. S/p thyroidectomy 2010  Plan;  -Continue PTA levothyroxine 175mcg qD   -Monitor for worsening s/symptoms of concerns     Chronic Shoulder Pain  Osteoarthritis   Lower back pain  Comment: Chronic. stable  Plan:  -Monitor pain complaints  -Continue diclofenac gel application to painful areas QID  -Continue Tylenol 1000mg BID    -Continue gabapentin 300mg at HS--only able to tolerate liquid given gelatin capsule allergy     Constipation   Nausea  GERD  Comment: Chronic. S/T polypharmacy.   Plan:  -Monitor BM patterns  -Continue TUMS 1000mg every 4 hours PRN  -Continue beneFiber qd    -Zofran PRN     Epistaxis  Comment: Acute on chronic. She reports occasional nose bleed at times. No episodes while on TCU.   Plan:  -Monitor bleeding risks  -Monitor for worsening s/symptoms of concerns  -Afrin BID PRN on TCU if warranted     Vaginal bleeding  Comment: Acute on chronic. Referred to OBGYN. Last menstrual period she reports has been 40 years ago or so. Known family history of endometrial cancer that metastasized to bowel per her reports. She reports her mother had extensive genetic testing for review in EPIC. Reports mother name: Anne Ypa  3/9/23 for reference if indicated. Per OBGYN on 25:  If the endometrial stripe is greater than 4 mm or if it is less than 4 mm and she continues to have bleeding, she will need tissue evaluation with either office endometrial biopsy or hysteroscopy with D&C in the operating room. She does not believe she can tolerate an endometrial biopsy in the clinic, so we discussed doing this as an outpatient procedure. No further vaginal since her TCU readmission.   Plan:  -Monitor for worsening s/symptoms of concerns  -Follow up with OBGYN as directed  -BMP and CBC due 25     Physical deconditioning  Generalized muscle weakness  History of falls  Comment: Acute on chronic. Known poor history PTA. [facility] given concerns. Per therapy-Patient requires MOdA for bed and UB needs. Maximum assistance for LB needs and toileting. Angel lift for all transfers given weakness. Increased anxiety and fear with transfers since TCU return. Projected LCD  given poor therapy progression  Plan:  -Continue Physical therapy and Occupational therapy  -Recommend cognitive testing on  site  -Highly recommend MIKEY compliance post TCU     Electronically signed by:  Dr. Manda Flor DNP, APRN, FNP-C, WCS-C, EDS-C     Provider reviewed records from facility, and interpreted most recent imaging/lab work, and vital signs.   Acute and chronic conditions managed by writer. Have been reviewed during today's exam

## 2025-07-10 VITALS
HEART RATE: 84 BPM | TEMPERATURE: 98 F | DIASTOLIC BLOOD PRESSURE: 80 MMHG | SYSTOLIC BLOOD PRESSURE: 160 MMHG | OXYGEN SATURATION: 94 % | BODY MASS INDEX: 26.98 KG/M2 | WEIGHT: 178 LBS | RESPIRATION RATE: 18 BRPM | HEIGHT: 68 IN

## 2025-07-10 LAB
PATH REPORT.COMMENTS IMP SPEC: ABNORMAL
PATH REPORT.COMMENTS IMP SPEC: ABNORMAL
PATH REPORT.COMMENTS IMP SPEC: YES
PATH REPORT.FINAL DX SPEC: ABNORMAL
PATH REPORT.GROSS SPEC: ABNORMAL
PATH REPORT.MICROSCOPIC SPEC OTHER STN: ABNORMAL
PATH REPORT.RELEVANT HX SPEC: ABNORMAL

## 2025-07-11 ENCOUNTER — TRANSITIONAL CARE UNIT VISIT (OUTPATIENT)
Dept: GERIATRICS | Facility: CLINIC | Age: 75
End: 2025-07-11
Payer: MEDICARE

## 2025-07-11 DIAGNOSIS — C44.329 SQUAMOUS CELL CANCER OF SKIN OF LEFT CHEEK: ICD-10-CM

## 2025-07-11 DIAGNOSIS — N18.32 TYPE 2 DIABETES MELLITUS WITH STAGE 3B CHRONIC KIDNEY DISEASE, WITHOUT LONG-TERM CURRENT USE OF INSULIN (H): ICD-10-CM

## 2025-07-11 DIAGNOSIS — I50.32 CHRONIC DIASTOLIC HEART FAILURE (H): ICD-10-CM

## 2025-07-11 DIAGNOSIS — D72.829 LEUKOCYTOSIS, UNSPECIFIED TYPE: ICD-10-CM

## 2025-07-11 DIAGNOSIS — D64.9 CHRONIC ANEMIA: ICD-10-CM

## 2025-07-11 DIAGNOSIS — M81.0 AGE-RELATED OSTEOPOROSIS WITHOUT CURRENT PATHOLOGICAL FRACTURE: ICD-10-CM

## 2025-07-11 DIAGNOSIS — Z90.49 S/P LAPAROSCOPIC-ASSISTED SIGMOIDECTOMY: ICD-10-CM

## 2025-07-11 DIAGNOSIS — F32.A DEPRESSION, UNSPECIFIED DEPRESSION TYPE: ICD-10-CM

## 2025-07-11 DIAGNOSIS — Z98.890 S/P MOHS SURGERY FOR BASAL CELL CARCINOMA: ICD-10-CM

## 2025-07-11 DIAGNOSIS — Z91.81 PERSONAL HISTORY OF FALL: ICD-10-CM

## 2025-07-11 DIAGNOSIS — M35.00 SJOGREN'S SYNDROME, WITH UNSPECIFIED ORGAN INVOLVEMENT: ICD-10-CM

## 2025-07-11 DIAGNOSIS — C44.629 SQUAMOUS CELL CARCINOMA OF SKIN OF LEFT UPPER EXTREMITY, INCLUDING SHOULDER: ICD-10-CM

## 2025-07-11 DIAGNOSIS — M62.81 GENERALIZED MUSCLE WEAKNESS: ICD-10-CM

## 2025-07-11 DIAGNOSIS — K57.32 DIVERTICULITIS OF COLON: ICD-10-CM

## 2025-07-11 DIAGNOSIS — R53.81 PHYSICAL DECONDITIONING: ICD-10-CM

## 2025-07-11 DIAGNOSIS — Z94.4 STATUS POST LIVER TRANSPLANTATION (H): ICD-10-CM

## 2025-07-11 DIAGNOSIS — E46 PROTEIN-CALORIE MALNUTRITION, UNSPECIFIED SEVERITY: ICD-10-CM

## 2025-07-11 DIAGNOSIS — I10 HYPERTENSION GOAL BP (BLOOD PRESSURE) < 140/80: ICD-10-CM

## 2025-07-11 DIAGNOSIS — E11.22 TYPE 2 DIABETES MELLITUS WITH STAGE 3B CHRONIC KIDNEY DISEASE, WITHOUT LONG-TERM CURRENT USE OF INSULIN (H): ICD-10-CM

## 2025-07-11 DIAGNOSIS — B99.9 INTRA-ABDOMINAL INFECTION: ICD-10-CM

## 2025-07-11 DIAGNOSIS — K21.00 GASTROESOPHAGEAL REFLUX DISEASE WITH ESOPHAGITIS, UNSPECIFIED WHETHER HEMORRHAGE: ICD-10-CM

## 2025-07-11 DIAGNOSIS — N17.9 AKI (ACUTE KIDNEY INJURY): ICD-10-CM

## 2025-07-11 DIAGNOSIS — Z85.828 S/P MOHS SURGERY FOR BASAL CELL CARCINOMA: ICD-10-CM

## 2025-07-11 DIAGNOSIS — R04.0 EPISTAXIS: ICD-10-CM

## 2025-07-11 DIAGNOSIS — F41.9 ANXIETY: Primary | ICD-10-CM

## 2025-07-11 DIAGNOSIS — G47.33 OSA (OBSTRUCTIVE SLEEP APNEA): ICD-10-CM

## 2025-07-11 DIAGNOSIS — D69.6 THROMBOCYTOPENIA: ICD-10-CM

## 2025-07-11 DIAGNOSIS — G89.29 CHRONIC SHOULDER PAIN, UNSPECIFIED LATERALITY: ICD-10-CM

## 2025-07-11 DIAGNOSIS — K59.01 SLOW TRANSIT CONSTIPATION: ICD-10-CM

## 2025-07-11 DIAGNOSIS — I48.91 ATRIAL FIBRILLATION WITH RVR (H): ICD-10-CM

## 2025-07-11 DIAGNOSIS — N17.0 ACUTE TUBULAR NECROSIS: ICD-10-CM

## 2025-07-11 DIAGNOSIS — Z86.19 HX OF SEPSIS: ICD-10-CM

## 2025-07-11 DIAGNOSIS — E03.9 HYPOTHYROIDISM, UNSPECIFIED TYPE: ICD-10-CM

## 2025-07-11 DIAGNOSIS — M25.519 CHRONIC SHOULDER PAIN, UNSPECIFIED LATERALITY: ICD-10-CM

## 2025-07-11 DIAGNOSIS — R09.02 HYPOXIA: ICD-10-CM

## 2025-07-11 DIAGNOSIS — N93.9 VAGINAL BLEEDING: ICD-10-CM

## 2025-07-11 DIAGNOSIS — G45.9 TIA (TRANSIENT ISCHEMIC ATTACK): ICD-10-CM

## 2025-07-11 DIAGNOSIS — I95.9 HYPOTENSION, UNSPECIFIED HYPOTENSION TYPE: ICD-10-CM

## 2025-07-11 DIAGNOSIS — E78.5 HYPERLIPIDEMIA LDL GOAL <70: ICD-10-CM

## 2025-07-11 DIAGNOSIS — N18.32 STAGE 3B CHRONIC KIDNEY DISEASE (H): ICD-10-CM

## 2025-07-11 DIAGNOSIS — R06.02 SOB (SHORTNESS OF BREATH): ICD-10-CM

## 2025-07-11 DIAGNOSIS — G47.34 NOCTURNAL HYPOXEMIA: ICD-10-CM

## 2025-07-11 DIAGNOSIS — R11.0 NAUSEA: ICD-10-CM

## 2025-07-11 DIAGNOSIS — Z93.3 COLOSTOMY PRESENT (H): ICD-10-CM

## 2025-07-11 DIAGNOSIS — C73 PAPILLARY THYROID CARCINOMA (H): ICD-10-CM

## 2025-07-11 DIAGNOSIS — M54.50 BILATERAL LOW BACK PAIN WITHOUT SCIATICA, UNSPECIFIED CHRONICITY: ICD-10-CM

## 2025-07-11 NOTE — LETTER
7/11/2025      Luz Thompson  [id number]  [place] [id number]        No notes on file      Sincerely,        LIZ Obando CNP    Electronically signed

## 2025-07-12 PROBLEM — R57.0 CARDIOGENIC SHOCK (H): Status: ACTIVE | Noted: 2025-01-01

## 2025-07-12 NOTE — H&P
Cardiology ICU Note    07/12/2025    Luz Thompson MRN# [id number]   Age: 75 year old YOB: 1950          H&P:   Luz Thompson is a 75 year old female who was admitted on 7/12/2025 to CICU for severe hypotension and bradycardia in florid cardiogenic shock due to subacute inferior STEMI s/p PCI to RCA with residual sluggish flow, requiring temporary pacer placement and IABP, now on dobutamine, and intubated.     [place] has a PMHx of HTN, HLD, T2DM, Afib with RVR, CKD3b, CVA, liver transplant for PBC, Sjogren's syndrome, recent diverticulitis with sigmoidectomy and end colostomy (5/30/25), recent Klebsiella and Pseudomonas sepsis, active squamous cell carcinoma of the LUE, chronic anemia, papillary thyroid carcinoma s/p thyroidectomy (2010) now with hypothyroidism; recent admission on 6/22/25 - 6/25/25 for Afib with RVR. She now presents from SNF with shock. Per ED note, pt felt nauseous and vomited after breakfast, has been having neck spasms and fatigue. Presented with cough. Unable to obtain history, pt arrived from SNF and is now intubated.          Assessment and Plan:     Neurology   # Concern for acute encephalopathy   # History of CVA  # MDD/Anxiety  - on Versed for sedation, RASS - 1 to -2  - monitor neuro status  - delirium precautions  - hold PTA zoloft 50 mg daily for now until med rec  - when able would get CTH imaging; ordered    Cardiovascular  # Cardiogenic shock, SCAI C  # Bradycardia  # Subacute inferior STEMI s/p PCI to RCA 7/12/25  # CAD  # HTN  # HLD  At home was taking metoprolol succinate 25 mg daily; evolocumab q2week, ezetemibe 10 mg daily, Apixaban 2.5 mg BID. She presented with HR in the 30's; ECG on admission shows Afib with slow ventricular response. Found to have subacute inferior STEMI s/p PCI. Sluggish flow through RCA She is in cardiogenic shock    Work up:  Coronary angiogram and right heart cath 7/12:       Prox LAD to Mid LAD lesion is 70% stenosed.     Prox RCA to Dist RCA lesion is 100% stenosed.    Right sided filling pressures are severely elevated.    Left sided filling pressures are moderately elevated.    Mild elevated pulmonary hypertension.    Reduced cardiac output level.  Subacute inferior STEMI with occlusion of the RCA  Two-vessel coronary artery disease  PCI/CELESTINE (1X) of the proximal RCA without restoration of full flow due to thrombus burden  70% stenosis of the proximo-mid LAD  Insertion of temporary pacemaker wire for bradycardia  Insertion of a intra-aortic balloon pump for cardiogenic shock  Insertion of A-line and Carlisle-Sandra catheter  3.  Cardiogenic shock with right heart failure    TTE: Ordered    TVP: VVI set at 100 BPM, at 20 Ma     Plan:  - s/p Brillinta load, ASA load in cath lab  - stop cangrelor gtt from cath lab; no heparin gtt  - IABP in place, I:I for MCS  - Balloon tipped temp pacer in place; increased rate from 80 to 100 given worsening shock  - Pressors: currently on max dose NE, Epi, and now on Vaso  - Diuretics: hold for now   - hold home apixaban 2.5 mg BID  - TTE oprdered  - hold PTA zetia and evolocumab  - when able needs CT C/A/P; ordered  - swan-sandra catheter in place, check hemos q4h       Pulmonary  # Acute hypoxic respiratory failure  - intubated 7/12/25  - CXR  - daily ABG  - adjust vent setting to compensate for metabolic acidosis    Resp Rate (Set): 20 breaths/min, Tidal Volume (Set, mL): 400 mL, PEEP (cm H2O): 5 cmH2O, Resp Rate (Set): 20 breaths/min, Tidal Volume (Set, mL): 400 mL, PEEP (cm H2O): 5 cmH2O    #RASHIDA  - SpO2 goal >92%        Gastrointestinal, Nutrition    #Liver Transplant in 2022 due to primary biliary cirrhosis  Follows with Dr. Ramirez. Recently evaluted by transplant hepatology who recommended against sirolimus. On PTA prednisone 9 mg. Currently takes Tacrolimus and ursodiol 250 mg BID  - hold PTA tacrolimus for now upon admission; will med rec and order for tomorrow morning   - check tacrolimus level  -  liver consult in the AM (needs ordered)    # Diverticulitis c/b abscess, s/p open sigmoidectomy with end colostomy 5/30/25  Has a colostomy pouch. Was due to follow up with colorectal surgery on 7/15/25 and wound care nursing on 7/14/25  - WOC consult for new ostomy placed  - If concerns, will consult general surgery    # Nutrition  - NPO due to high pressor req     Renal, Electrolytes  # Lactic acidosis  # High Anion Gap Metaolic Acidosis  - trend ABG  - increase RR on vent  - decrease pressors as able    # CKD3b  Baseline  - monitor renal function    Infectious Disease  # Concern for Sepsis  # Leukocytosis  Had a recent hx of Klebsiella and Pseudomonas bacteremia on 5/28/25 suspected from intra-abdominal origin; which was treated with meropenem then ceftolozaone-tazobactam due to resistance, and then switched from vancomycin to linezolid due to DERREK.     Plan:  - STAT Bcx, Ucx, sputum Cx  - COVID/RSV/Flu swab now  - currently too unstable to CT C/A/P; when able needs to get STAT imaging  - Based on past sensitivities and concerns for septic shock, will start broad spectrum abx  - Linezolid 600 mg q12h 7/12 -   - ceftolozane-tazobactam 7/12 -     Hematology  # Anemia  Oozing from access sites. DAPT loaded.   - Hgb goal >7  - trend CBC q6h for now    Endocrinology  # T2DM  A1c was 5.8% on 7/7/25. At home takes Linagliptin 5 mg daily and gabapentin 300 mg at bedtime  - insulin gtt while inpatient    #Papillary thyroid carcinoma s/p thyroidectomy (2010)  #Secondary Hypothyroidism  Recent TSH 4.1 on 6/22/25.  - continue PTA levothyroxine 125 mcg every day (needs to be ordered)  - check TSH    Integumentary:  # Left cheek SCC s/p MOHS 4/8/25  # SCC of LUE  Tumor debilk with perineural invasion w/ pending Tumor board consideration of radiation therapy.  Had biopsy of her left arm lesion on 7/8/25 which confirmed SCC  - continue to monitor for now  - per dermatology; pt needs complete imaging for staging purposes, awaiting  "PET/CT as outpatient    Lines:  L internal jugular with swan  R femoral sheath with temporary pacer  Arterial sheath for IABP  1 PIV    ICU Checklist:  #Feeding: NPO for now  #Analgesia: Acetaminophen  #Sedation: Versed  #Thromboembolism ppx: SQH 5000u TID ; hold if bleeding  #HOB: 30 degrees   #Ulcer ppx: Reportedly allergic to PPI - check with pharmacy in AM  #Glucose: Insulin gtt  #SBT/SAT: Not appropriate now  #Bowel reg: prn  #Family: Unable to update at this time, try again tonight or tomorrow (daughter Gloria # in chart)    Code Status: Full, presumed    The pt was discussed with the attending physician, Dr. Pérez.     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            Family History:     Family History   Problem Relation Age of Onset    Hypertension Mother     Endometrial Cancer Mother     Hyperlipidemia Mother     Prostate Cancer Father     Macular Degeneration Father     Cancer - colorectal Maternal Grandmother         in her 80's, has surgery and removal of part of kidney,  at age 98    Heart Disease Maternal Grandfather          at 98    Glaucoma Maternal Grandfather     Cerebrovascular Disease Paternal Grandmother         in her 80's    Hypertension Paternal Grandmother     Heart Disease Paternal Grandfather         MI    Alzheimer Disease Paternal Grandfather     Allergies Son     Neurologic Disorder Daughter         Migraines    Breast Cancer Other     Anesthesia Reaction No family hx of     Crohn's Disease No family hx of     Ulcerative Colitis No family hx of     Melanoma No family hx of     Skin Cancer No family hx of             Social History:     Social History     Tobacco Use    Smoking status: Former     Current packs/day: 0.00     Average packs/day: 1 pack/day for 18.0 years (18.0 ttl pk-yrs)     Types: Cigarettes     Start date: 1967     Quit date: 1985     Years since quittin.2    Smokeless tobacco: Never   Vaping Use    Vaping status: Never Used   Substance Use Topics    Alcohol use: Yes     Alcohol/week: 0.0 standard drinks of alcohol     Comment: rare - \"I toast at weddings\"    Drug use: No             Past Surgical History:     Past Surgical History:   Procedure Laterality Date    APPENDECTOMY      CATARACT IOL, RT/LT      RE2013, LE12/10/2013 - Toric lenses    CHOLECYSTECTOMY      COLECTOMY WITH COLOSTOMY, COMBINED N/A 2025    " Procedure: Open sigmoidectomy with end colostmy;  Surgeon: Al Campbell MD;  Location: [facility] OR    COLONOSCOPY  03/10/2014    Procedure: COLONOSCOPY;;  Surgeon: Gentry Ramirez MD;  Location: [facility] GI    CYSTOSCOPY      ear drum repair      ENDOBRONCHIAL ULTRASOUND FLEXIBLE N/A 09/29/2017    Procedure: ENDOBRONCHIAL ULTRASOUND FLEXIBLE;  Flexible Bronchoscopy, Endobronchial Ultrasound, Transbronchial Needle Aspiration ;  Surgeon: Eden Clinton MD;  Location: [facility] OR    ENDOSCOPIC RETROGRADE CHOLANGIOPANCREATOGRAM  09/19/2013    Procedure: ENDOSCOPIC RETROGRADE CHOLANGIOPANCREATOGRAM;  Endoscopic Retrograde Cholangiopancreatogram with single balloon enteroscopy, ballon sweep of bile duct;  Surgeon: Brett Membreno MD;  Location: [facility] OR    HC KNEE SCOPE,MED/LAT MENISECTOMY Right 08/10/2012    partial medial menisectomy only    INSERT STENT URETER Bilateral 5/29/2025    Procedure: bilateral Insert and removal stent ureter, cystoscopy;  Surgeon: David Leung MD;  Location: [facility] OR    KNEE SURGERY  1966    R knee    PICC INSERTION  09/18/2013    4fr SL PASV PICC, 40cm (1cm external) in the R basilic vein w/ tip in the low SVC    PICC INSERTION  02/21/2014    5 fr DL BioFlo Navilyst PICC, 46 cm (3 cm external) in the L basilic vein w/ tip in the SVC RA junction.    THYROIDECTOMY  03/2010    TRANSPLANT LIVER RECIPIENT LIVING UNRELATED  05/2002              Review of Systems:     Noted in the HPI above, otherwise negative          Physical Exam:   BP (!) 65/35   Pulse (!) 30   LMP 06/01/1988 (Approximate)       GENERAL: NAD  HEENT: No icterus. MMM  CARDIOVASCULAR: RRR. Normal S1 and S2.  RESP: Coarse anterior sounds  GI Soft, ostomy bag very malodorous and full of stool  GENITOURINARY: aj placed  EXTREMITIES: cool, no edema  NEURO: Sedated    Lines:             Data:                  Arterial Blood Gas:   Recent Labs   Lab 07/12/25  1448   PH 7.08*   PCO2 53*   PO2 71*   HCO3 16*   O2PER 100.0     There were no vitals  "filed for this visit.No intake/output data recorded.  Recent Labs   Lab 07/12/25  1448 07/12/25  1404 07/12/25  1403 07/07/25  0748    138 138 142   POTASSIUM 4.1 4.8 5.0 5.3   CHLORIDE  --   --  104 107   CO2  --   --  19* 23   ANIONGAP  --   --  15 12   * 216* 225* 83   BUN  --   --  35.0* 34.9*   CR  --   --  1.46* 1.35*   KEILY  --   --  9.2 9.4     No components found for: \"URINE\"   Recent Labs   Lab 07/12/25  1403 07/07/25  0748   AST 35 22   ALT 27 18   BILITOTAL <0.2 0.2   ALBUMIN 3.2* 3.2*   PROTTOTAL 5.9* 5.8*   ALKPHOS 98 86        No data recorded   Recent Labs   Lab 07/12/25  1448 07/12/25  1404 07/12/25  1403 07/07/25  0748   WBC  --   --  28.9* 11.0   HGB 8.9* 11.2* 10.3* 9.8*   HCT  --  33* 35.1 34.5*   MCV  --   --  104* 104*   RDW  --   --  19.9* 21.0*   PLT  --   --  433 515*     Recent Labs   Lab 07/12/25  1403   INR 1.14     Recent Labs   Lab 07/12/25  1448 07/12/25  1404 07/12/25  1403 07/07/25  0748   * 216* 225* 83         All lab results reviewed    Recent Results (from the past 24 hours)   XR Chest Port 1 View    Impression    RESIDENT PRELIMINARY INTERPRETATION  IMPRESSION:   1.  Diffuse mixed interstitial and patchy airspace opacities, this may  represent edema, infection, or atelectasis.  2.  Small right pleural effusion.           "

## 2025-07-12 NOTE — Clinical Note
Stent deployed in the proximal right coronary artery. Max pressure = 11 geeta. Total duration = 15 seconds.

## 2025-07-12 NOTE — Clinical Note
IABP inserted in the right femoral artery. IABP inserted with 50 cc balloon volume Lot number is: [id number]

## 2025-07-12 NOTE — PROGRESS NOTES
[facility]         Intra-Aortic Balloon Pump Monitoring:       Line Assessment:  Site: Right Femoral Artery  Catheter Size: 8 Fr.  Balloon Volume: 50 cc  Fiberoptic: YES  Sheath: YES        IABP Settings:  Mode: Auto             Patient Parameters:  IABP Heart Rate: 80  Assisted Systolic:86  Assisted Diastolic: 57  Pump Mean: 102  Augmented Diastolic: 176  Unassisted Systolic: (not recorded)  Unassisted Diastolic: (not recorded)    Angelika Montiel, RT  ECMO Specialist  7/12/2025 4:16 PM

## 2025-07-12 NOTE — Clinical Note
Secured with Catheter remains in place at 48cm.    Secured in normal fashion with by swan dontae cover.

## 2025-07-12 NOTE — Clinical Note
The first balloon was inserted into the right coronary artery and proximal right coronary artery.Max pressure = 6 geeta. Total duration = 10 seconds.     Max pressure = 6 geeta. Total duration = 10 seconds.

## 2025-07-12 NOTE — Clinical Note
The first balloon was inserted into the right coronary artery and proximal right coronary artery.Max pressure = 8 geeta. Total duration = 10 seconds.     Max pressure = 8 geeta. Total duration = 10 seconds.

## 2025-07-12 NOTE — Clinical Note
The first balloon was inserted into the right coronary artery and middle right coronary artery.Max pressure = 6 geeta. Total duration = 10 seconds.     Max pressure = 8 geeta. Total duration = 10 seconds.

## 2025-07-12 NOTE — ED TRIAGE NOTES
Pt BIBA from nursing home for concerns of hypotension.  B/P en route 63/48.  Variable O2 sats, 2L NC applied.

## 2025-07-12 NOTE — Clinical Note
The first balloon was inserted into the right coronary artery and proximal right coronary artery.Max pressure = 14 geeta. Total duration = 10 seconds.

## 2025-07-12 NOTE — ED PROVIDER NOTES
"    [place] EMERGENCY DEPARTMENT ([facility])    7/12/25       ED PROVIDER NOTE   History     Chief Complaint   Patient presents with    Hypotension     HPI  Luz Thompson is a 75 year old female with a history of atrial fibrillation, DVT on Eliquis, CVA, CKD III, HFpEF (TTE 3/9/25 EF 60-65%), T2DM, biliary cirrhosis s/p liver transplant in 2002, EBV, HTN, HLD, Sjogren's syndrome, Basal Cell Carincoma s/p MOHS on 4/8/25, thyroid cancer s/p thyroidectomy in 2010 who presents to the ED for hypotension. Per EMS personnel, patient was transferred by patient's care facility [facility]. EMS said she was noted to be hypotensive at 58/33 on first check, nauseous, and vomiting. Patient's 2nd bp check was 63/48 and required further evaluation at [place]. Patient was put on 2 L of O2. She is noted have colostomy bag.     Patient here in the ED says that she felt nauseous and vomited after her breakfast. She reports neck spasms and has associated fatigue. She notes mild upper abdominal and minor chest pain. Patient also notes she has had recent coughs with production of sputum. She says her colostomy bag has had frequent issues requiring changes.     Per [facility] records, patient had Pseudomonas VRE bacteremia and was admitted on 6/23/25 for evaluation of unresolved atrial fibrillation with rapid ventricular response. Patient had period of blood pressure monitoring and was given IV fluids. Patient was discharged on 0/25/25. Patient was prescribed PRN 25mg Metoprolol.    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     Deep venous thrombosis     " Diverticulosis of sigmoid colon 12/21/2013    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, history of     Received Rituxan during Summer of 2016    Glaucoma     H/O esophageal varices     Hearing loss     Hyperlipidemia 04/10/2012    Says that she does not have it anymore, not on meds    Hypertension     Hypertriglyceridemia     Liver replaced by transplant (H) 10/17/2011    Dr. 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     Past Surgical History:   Procedure Laterality Date    APPENDECTOMY  1961    CATARACT IOL, RT/LT      RE12/19/2013, LE12/10/2013 - Toric lenses    CHOLECYSTECTOMY  1991    COLECTOMY WITH COLOSTOMY, COMBINED N/A 5/29/2025    Procedure: Open sigmoidectomy with end colostmy;  Surgeon: Al Campbell MD;  Location: [facility] OR    COLONOSCOPY  03/10/2014    Procedure: COLONOSCOPY;;  Surgeon: Gentry Ramirez MD;  Location:  GI    CYSTOSCOPY      ear drum repair      ENDOBRONCHIAL ULTRASOUND FLEXIBLE N/A 09/29/2017    Procedure: ENDOBRONCHIAL ULTRASOUND FLEXIBLE;  Flexible Bronchoscopy, Endobronchial Ultrasound, Transbronchial Needle Aspiration ;  Surgeon: Eden Clinton MD;  Location: [facility] OR    ENDOSCOPIC RETROGRADE CHOLANGIOPANCREATOGRAM  09/19/2013    Procedure: ENDOSCOPIC RETROGRADE CHOLANGIOPANCREATOGRAM;  Endoscopic Retrograde Cholangiopancreatogram with single balloon enteroscopy, ballon sweep of bile duct;  Surgeon: Brett Membreno MD;  Location: [facility] OR    HC KNEE SCOPE,MED/LAT MENISECTOMY Right  08/10/2012    partial medial menisectomy only    INSERT STENT URETER Bilateral 5/29/2025    Procedure: bilateral Insert and removal stent ureter, cystoscopy;  Surgeon: David Leung MD;  Location: [facility] OR    KNEE SURGERY  1966    R knee    PICC INSERTION  09/18/2013    4fr SL PASV PICC, 40cm (1cm external) in the R basilic vein w/ tip in the low SVC    PICC INSERTION  02/21/2014    5 fr DL BioFlo Navilyst PICC, 46 cm (3 cm external) in the L basilic vein w/ tip in the SVC RA junction.    THYROIDECTOMY  03/2010    TRANSPLANT LIVER RECIPIENT LIVING UNRELATED  05/2002     No current outpatient medications on file.    Allergies   Allergen Reactions    Fluconazole Hives and Itching     Full body hives  **Intradermal skin testing negative**  [See intradermal skin testing results from 8/2/2019]    Mycophenolate Diarrhea and Nausea and Vomiting     Patient stated it was chronic and lasted months      Penicillins Anaphylaxis, Hives, Itching and Rash     **Intradermal skin testing negative**  [See intradermal skin testing results from 8/2/2019]      Simvastatin Muscle Pain (Myalgia)     severe  Other reaction(s): Myalgia caused by statin    Methotrexate Other (See Comments)     Other reaction(s): Sore  Sores in mouth, esophagus, and stomach.       Morphine And Codeine Itching and Other (See Comments)     Psych disturbance  Other reaction(s): Confusion, Mood alteration    Quinolones Anxiety, Dizziness, Headache, Other (See Comments), Palpitations and Unknown     Other reaction(s): Hyperactive behavior, Lightheadedness, Mood alteration    Dizzy, light headed    Dizziness, shaky, and jumpy    Capsules, Empty Gelatin [Gelatin]     Carvedilol Diarrhea     Per pt - severe diarrhea and LE swelling  + tolerating Toprol    Lansoprazole Diarrhea    Strawberry Extract     Azithromycin Itching     [See intradermal skin testing results from 8/2/2019]    Bactrim [Sulfamethoxazole-Trimethoprim] Other (See Comments)     Numb mouth, tingling  "lips (treated with anti-histamines)    Cephalosporins Itching     [See intradermal skin testing results from 2019]    Ciprofloxacin Hcl Other (See Comments) and Dizziness     Insomnia, mood lability, Irregular heart beat         Doxycycline Itching and Unknown     [See intradermal skin testing results from 2019]    Lisinopril Cough    Omeprazole Itching    Tigecycline Other (See Comments)     Perioral numbness in  with long-term use    Tolectin [Nsaids] Rash    Tolmetin Rash and Itching    Tramadol Rash, Hives and Itching     Family History  Family History   Problem Relation Age of Onset    Hypertension Mother     Endometrial Cancer Mother     Hyperlipidemia Mother     Prostate Cancer Father     Macular Degeneration Father     Cancer - colorectal Maternal Grandmother         in her 80's, has surgery and removal of part of kidney,  at age 98    Heart Disease Maternal Grandfather          at 98    Glaucoma Maternal Grandfather     Cerebrovascular Disease Paternal Grandmother         in her 80's    Hypertension Paternal Grandmother     Heart Disease Paternal Grandfather         MI    Alzheimer Disease Paternal Grandfather     Allergies Son     Neurologic Disorder Daughter         Migraines    Breast Cancer Other     Anesthesia Reaction No family hx of     Crohn's Disease No family hx of     Ulcerative Colitis No family hx of     Melanoma No family hx of     Skin Cancer No family hx of      Social History   Social History     Tobacco Use    Smoking status: Former     Current packs/day: 0.00     Average packs/day: 1 pack/day for 18.0 years (18.0 ttl pk-yrs)     Types: Cigarettes     Start date: 1967     Quit date: 1985     Years since quittin.2    Smokeless tobacco: Never   Vaping Use    Vaping status: Never Used   Substance Use Topics    Alcohol use: Yes     Alcohol/week: 0.0 standard drinks of alcohol     Comment: rare - \"I toast at weddings\"    Drug use: No      Past medical history, " past surgical history, medications, allergies, family history, and social history were reviewed with the patient. No additional pertinent items.   A medically appropriate review of systems was performed with pertinent positives and negatives noted in the HPI, and all other systems negative.    Physical Exam   BP: (!) 70/34  Pulse: (!) 42  Physical Exam  Vitals and nursing note reviewed.   Constitutional:       General: She is not in acute distress.     Appearance: She is not ill-appearing, toxic-appearing or diaphoretic.   HENT:      Head: Normocephalic and atraumatic.      Mouth/Throat:      Mouth: Mucous membranes are moist.      Pharynx: Oropharynx is clear.   Eyes:      Extraocular Movements: Extraocular movements intact.      Conjunctiva/sclera: Conjunctivae normal.      Pupils: Pupils are equal, round, and reactive to light.   Cardiovascular:      Rate and Rhythm: Bradycardia present.      Heart sounds: Normal heart sounds.   Pulmonary:      Effort: Pulmonary effort is normal. No respiratory distress.      Breath sounds: Normal breath sounds.   Abdominal:      General: Abdomen is flat.      Palpations: Abdomen is soft.      Tenderness: There is no abdominal tenderness.      Comments: Ostomy bag in place with brown liquid output.   Musculoskeletal:         General: No tenderness. Normal range of motion.      Cervical back: Full passive range of motion without pain, normal range of motion and neck supple. No edema, erythema, signs of trauma, rigidity or tenderness. No pain with movement, spinous process tenderness or muscular tenderness. Normal range of motion.   Lymphadenopathy:      Cervical: No cervical adenopathy.   Skin:     General: Skin is warm.      Capillary Refill: Capillary refill takes more than 3 seconds.      Coloration: Skin is pale.      Findings: No rash.   Neurological:      General: No focal deficit present.      Mental Status: She is alert and oriented to person, place, and time.    Psychiatric:         Mood and Affect: Mood normal.         Behavior: Behavior normal.           ED Course, Procedures, & Data      Procedures            EKG Interpretation:      Interpreted by Cassie Mccauley MD  Time reviewed:   2:11 PM  Symptoms at time of EKG: Shock  Rhythm: Atrial fibrillation  Rate: 53  Axis: normal  Ectopy: none  Conduction: normal  ST Segments/ T Waves: ST elevation in leads III, aVF with reciprocal changes in 1, aVL  Q Waves: none  Comparison to prior: Different than old EKG done on June 22, 2025    Clinical Impression: Atrial fibrillation, inferior STEMI      IV Antibiotics given and/or elevated Lactate of 8.6 and no sepsis note found - Delete this reminder and enter the sepsis note or '.edcms' before signing chart.>>>     Results for orders placed or performed during the hospital encounter of 07/12/25   XR Chest Port 1 View    Impression    IMPRESSION:   1.  Diffuse mixed interstitial and patchy airspace opacities, this may  represent edema, infection, or atelectasis.  2.  Small right pleural effusion.    I have personally reviewed the examination and initial interpretation  and I agree with the findings.    MOODY GROVER MD         SYSTEM ID:  [id number]   INR   Result Value Ref Range    INR 1.14 0.85 - 1.15    PT 14.9 (H) 11.8 - 14.8 Seconds   Comprehensive metabolic panel   Result Value Ref Range    Sodium 138 135 - 145 mmol/L    Potassium 5.0 3.4 - 5.3 mmol/L    Carbon Dioxide (CO2) 19 (L) 22 - 29 mmol/L    Anion Gap 15 7 - 15 mmol/L    Urea Nitrogen 35.0 (H) 8.0 - 23.0 mg/dL    Creatinine 1.46 (H) 0.51 - 0.95 mg/dL    GFR Estimate 37 (L) >60 mL/min/1.73m2    Calcium 9.2 8.8 - 10.4 mg/dL    Chloride 104 98 - 107 mmol/L    Glucose 225 (H) 70 - 99 mg/dL    Alkaline Phosphatase 98 40 - 150 U/L    AST 35 0 - 45 U/L    ALT 27 0 - 50 U/L    Protein Total 5.9 (L) 6.4 - 8.3 g/dL    Albumin 3.2 (L) 3.5 - 5.2 g/dL    Bilirubin Total <0.2 <=1.2 mg/dL   Lactic acid whole blood with 1x repeat  in 2 hr when >2   Result Value Ref Range    Lactic Acid, Initial 4.4 (HH) 0.7 - 2.0 mmol/L   Result Value Ref Range    Troponin T, High Sensitivity 98 (H) <=14 ng/L   TSH with free T4 reflex   Result Value Ref Range    TSH 12.10 (H) 0.30 - 4.20 uIU/mL   CBC with platelets and differential   Result Value Ref Range    WBC Count 28.9 (H) 4.0 - 11.0 10e3/uL    RBC Count 3.39 (L) 3.80 - 5.20 10e6/uL    Hemoglobin 10.3 (L) 11.7 - 15.7 g/dL    Hematocrit 35.1 35.0 - 47.0 %     (H) 78 - 100 fL    MCH 30.4 26.5 - 33.0 pg    MCHC 29.3 (L) 31.5 - 36.5 g/dL    RDW 19.9 (H) 10.0 - 15.0 %    Platelet Count 433 150 - 450 10e3/uL   iStat Gases Electrolytes ICA Glucose Venous, POCT   Result Value Ref Range    CPB Applied No     Hematocrit POCT 33 (L) 35-47 % %    Calcium, Ionized Whole Blood POCT 5.0 4.4 - 5.2 mg/dL    Glucose Whole Blood POCT 216 (H) 70 - 99 mg/dL    Bicarbonate Venous POCT 23 21 - 28 mmol/L    Hemoglobin POCT 11.2 (L) 11.7 - 15.7 g/dL    Potassium POCT 4.8 3.4 - 5.3 mmol/L    Sodium POCT 138 135 - 145 mmol/L    pCO2 Venous POCT 51 (H) 40 - 50 mm Hg    pO2 Venous POCT 18 (L) 25 - 47 mm Hg    pH Venous POCT 7.26 (L) 7.32 - 7.43    O2 Sat, Venous POCT 20 (L) 70 - 75 %    Base Excess/Deficit (+/-) POCT -5.0 (L) -3.0 - 3.0 mmol/L   RBC and Platelet Morphology   Result Value Ref Range    RBC Morphology Confirmed RBC Indices     Platelet Assessment  Automated Count Confirmed. Platelet morphology is normal.     Automated Count Confirmed. Platelet morphology is normal.    Acanthocytes Slight (A) None Seen    Lolis Cells Slight (A) None Seen    Elliptocytes Slight (A) None Seen    Polychromasia Slight (A) None Seen   Manual Differential   Result Value Ref Range    % Neutrophils 82 %    % Lymphocytes 11 %    % Monocytes 3 %    % Eosinophils 0 %    % Basophils 0 %    % Metamyelocytes 2 %    % Myelocytes 3 %    Absolute Neutrophils 23.8 (H) 1.6 - 8.3 10e3/uL    Absolute Lymphocytes 3.2 0.8 - 5.3 10e3/uL    Absolute  Monocytes 0.7 0.0 - 1.3 10e3/uL    Absolute Eosinophils 0.0 0.0 - 0.7 10e3/uL    Absolute Basophils 0.0 0.0 - 0.2 10e3/uL    Absolute Metamyelocytes 0.5 (H) <=0.0 10e3/uL    Absolute Myelocytes 0.7 (H) <=0.0 10e3/uL   Arterial Panel POCT   Result Value Ref Range    pH Arterial POCT 7.08 (LL) 7.35 - 7.45    pCO2 Arterial POCT 53 (H) 35 - 45 mm Hg    pO2 Arterial POCT 71 (L) 80 - 105 mm Hg    Bicarbonate Arterial POCT 16 (L) 21 - 28 mmol/L    Sodium POCT 142 135 - 145 mmol/L    Potassium POCT 4.1 3.4 - 5.3 mmol/L    Hemoglobin POCT 8.9 (L) 11.7 - 15.7 g/dL    Glucose Whole Blood POCT 250 (H) 70 - 99 mg/dL    Calcium, Ionized Whole Blood POCT 4.6 4.4 - 5.2 mg/dL    Base Excess/Deficit (+/-) POCT -13.5 (L) -3.0 - 3.0 mmol/L    FIO2 POCT 100.0 %    O2 Sat, Arterial POCT 87 (L) 95 - 96 %    Lactic Acid POCT 5.1 (HH) 0.7 - 2.0 mmol/L    Oxyhemoglobin Arterial POCT 85 (L) 92 - 100 %   Result Value Ref Range    Free T4 1.08 0.90 - 1.70 ng/dL   Arterial Panel POCT   Result Value Ref Range    pH Arterial POCT 7.13 (LL) 7.35 - 7.45    pCO2 Arterial POCT 45 35 - 45 mm Hg    pO2 Arterial POCT 68 (L) 80 - 105 mm Hg    Bicarbonate Arterial POCT 15 (L) 21 - 28 mmol/L    Sodium POCT 136 135 - 145 mmol/L    Potassium POCT 3.6 3.4 - 5.3 mmol/L    Hemoglobin POCT 8.3 (L) 11.7 - 15.7 g/dL    Glucose Whole Blood POCT 286 (H) 70 - 99 mg/dL    Calcium, Ionized Whole Blood POCT 4.2 (L) 4.4 - 5.2 mg/dL    Base Excess/Deficit (+/-) POCT -13.7 (L) -3.0 - 3.0 mmol/L    FIO2 POCT 100.0 %    O2 Sat, Arterial POCT 90 (L) 95 - 96 %    Lactic Acid POCT 6.2 (HH) 0.7 - 2.0 mmol/L    Oxyhemoglobin Arterial POCT 88 (L) 92 - 100 %   Activated clotting time celite, POCT   Result Value Ref Range    Activated Clotting Time (Celite) POCT 215 (H) 74 - 150 seconds   Arterial Panel POCT   Result Value Ref Range    pH Arterial POCT 7.05 (LL) 7.35 - 7.45    pCO2 Arterial POCT 74 (H) 35 - 45 mm Hg    pO2 Arterial POCT 53 (L) 80 - 105 mm Hg    Bicarbonate Arterial  POCT 21 21 - 28 mmol/L    Sodium POCT 145 135 - 145 mmol/L    Potassium POCT 3.8 3.4 - 5.3 mmol/L    Hemoglobin POCT 8.4 (L) 11.7 - 15.7 g/dL    Glucose Whole Blood POCT 324 (H) 70 - 99 mg/dL    Calcium, Ionized Whole Blood POCT 4.2 (L) 4.4 - 5.2 mg/dL    Base Excess/Deficit (+/-) POCT -9.8 (L) -3.0 - 3.0 mmol/L    FIO2 POCT 100.0 %    O2 Sat, Arterial POCT 71 (L) 95 - 96 %    Lactic Acid POCT 7.6 (HH) 0.7 - 2.0 mmol/L    Oxyhemoglobin Arterial POCT 70 (L) 92 - 100 %   Activated clotting time celite, POCT   Result Value Ref Range    Activated Clotting Time (Celite) POCT 263 (H) 74 - 150 seconds   Arterial Panel POCT   Result Value Ref Range    pH Arterial POCT 7.10 (LL) 7.35 - 7.45    pCO2 Arterial POCT 40 35 - 45 mm Hg    pO2 Arterial POCT 257 (H) 80 - 105 mm Hg    Bicarbonate Arterial POCT 13 (L) 21 - 28 mmol/L    Sodium POCT 138 135 - 145 mmol/L    Potassium POCT 3.5 3.4 - 5.3 mmol/L    Hemoglobin POCT 7.7 (L) 11.7 - 15.7 g/dL    Glucose Whole Blood POCT 368 (H) 70 - 99 mg/dL    Calcium, Ionized Whole Blood POCT 4.1 (L) 4.4 - 5.2 mg/dL    Base Excess/Deficit (+/-) POCT -15.9 (L) -3.0 - 3.0 mmol/L    FIO2 POCT 100.0 %    O2 Sat, Arterial POCT 100 (H) 95 - 96 %    Lactic Acid POCT 8.6 (HH) 0.7 - 2.0 mmol/L    Oxyhemoglobin Arterial POCT 99 92 - 100 %   Activated clotting time celite, POCT   Result Value Ref Range    Activated Clotting Time (Celite) POCT 288 (H) 74 - 150 seconds   Result Value Ref Range    Hemoglobin POCT 8.3 (L) 11.7 - 15.7 g/dL   Oxyhemoglobin, venous POCT   Result Value Ref Range    Oxyhemoglobin Venous POCT 35 (L) 70 - 75 %   EKG 12-lead, tracing only   Result Value Ref Range    Systolic Blood Pressure  mmHg    Diastolic Blood Pressure  mmHg    Ventricular Rate 53 BPM    Atrial Rate  BPM    HI Interval  ms    QRS Duration 74 ms     ms    QTc 446 ms    P Axis  degrees    R AXIS 45 degrees    T Axis 84 degrees    Interpretation ECG       Atrial fibrillation with slow ventricular  response  Low voltage QRS  Septal infarct , age undetermined  Abnormal ECG       Medications   ondansetron (ZOFRAN) injection 4 mg (has no administration in time range)   glucose gel 15-30 g (has no administration in time range)     Or   dextrose 50 % injection 25-50 mL (has no administration in time range)     Or   glucagon injection 1 mg (has no administration in time range)   bisacodyl (DULCOLAX) EC tablet 5 mg (has no administration in time range)     Or   bisacodyl (DULCOLAX) EC tablet 10 mg (has no administration in time range)     Or   bisacodyl (DULCOLAX) EC tablet 15 mg (has no administration in time range)   vasopressin 1 unit/mL MAX Conc (PITRESSIN) infusion (has no administration in time range)   lidocaine 1 % 0.1-1 mL (has no administration in time range)   lidocaine (LMX4) cream (has no administration in time range)   sodium chloride (PF) 0.9% PF flush 3 mL (has no administration in time range)   sodium chloride (PF) 0.9% PF flush 3 mL (has no administration in time range)   medication instruction (has no administration in time range)   nitroGLYcerin (NITROSTAT) sublingual tablet 0.4 mg (has no administration in time range)   alum & mag hydroxide-simethicone (MAALOX) suspension 30 mL (has no administration in time range)   acetaminophen (TYLENOL) tablet 650 mg (has no administration in time range)   acetaminophen (TYLENOL) Suppository 650 mg (has no administration in time range)   No anticoagulation (IABP 1:1) (has no administration in time range)   heparin ANTICOAGULANT injection 5,000 Units (has no administration in time range)   EPINEPHrine (ADRENALIN) 5 mg in  mL infusion (has no administration in time range)   norepinephrine (LEVOPHED) 16 mg in  mL infusion MAX CONC CENTRAL LINE (has no administration in time range)   linezolid (ZYVOX) infusion 600 mg (has no administration in time range)   DOBUTamine (DOBUTREX) 500 mg in D5W 250 mL infusion (adult std conc) (has no administration in  time range)   ceftolozane-tazobactam (ZERBAXA) 1.5 g vial to attach to  ml bag (has no administration in time range)   sodium chloride 0.9% BOLUS 2,000 mL (2,000 mLs Intravenous $New Bag 7/12/25 1401)   EPINEPHrine (ADRENALIN) 1 MG/10ML injection (20 mcg  $Given by Other 7/12/25 1425)   atropine 1 MG/10ML injection (0.5 mg IV/IM $Given 7/12/25 1419)   aspirin (ASA) tablet 325 mg (325 mg Oral $Given 7/12/25 1427)   norepinephrine (LEVOPHED) 16 mg in  mL infusion MAX CONC CENTRAL LINE (0.1 mcg/kg/min × 80.7 kg Intravenous $New Bag 7/12/25 1444)   EPINEPHrine (ADRENALIN) 5 mg in sodium chloride 0.9 % 250 mL infusion CENTRAL (0.3 mcg/kg/min × 80.7 kg Intravenous Rate/Dose Change 7/12/25 1535)   midazolam (VERSED) 100 mg/100 mL NS infusion - ADULT (4 mg/hr Intravenous $New Bag 7/12/25 1615)          Critical Care Addendum  My initial assessment, based on my review of prehospital provider report, review of nursing observations, review of vital signs, focused history, physical exam, and review of cardiac rhythm monitor, established a high suspicion that Luz Thompson has severe bradycardia and severe hypotension, which requires immediate intervention, and therefore she is critically ill.     After the initial assessment, the care team initiated IV fluid administration, initiated medication therapy with boluses of IV saline, IV epinephrine, oral aspirin, and consulted with interventional cardiology team to provide stabilization care. Due to the critical nature of this patient, I reassessed nursing observations, vital signs, physical exam, review of cardiac rhythm monitor, 12 lead ECG analysis, interpretation of imaging and laboratory studies, mental status, and respiratory status multiple times prior to her disposition.     Time also spent performing documentation, reviewing test results, discussion with consultants, and coordination of care.     Critical care time (excluding teaching time and procedures):  45 minutes.        Assessment & Plan    Luz Thompson is a 75-year-old woman sent from her facility due to hypotension, nausea, vomiting.  Differential diagnosis: Dehydration, electrolyte maladies, shock, sepsis, ACS.    After thorough history and physical exam patient appears to be no acute distress.  She is hypotensive and I will hydrate with about 2 L bolus of IV saline.  I will obtain laboratory studies, chest x-ray, and EKG for further diagnostic evaluation.  She agrees with the plan.  She is awake, alert, and mentating well.    Patient's laboratory studies with leukocytosis of 20,900. There is evidence of anemia, hemoglobin is 10.3.  Electrolytes show  evidence of dehydration, creatinine is slightly elevated 1.46.  Lactic acid is elevated at 4.4.  EKG was concerning for inferior STEMI and Cath Lab was activated.  Patient was sent to Cath Lab for further management prior to all of her studies resulting for me to review.      I reviewed the patient's chest x-ray and I read the radiology report; there is right pleural effusion.  No widened mediastinum.  No pneumothorax.    I have reviewed the nursing notes. I have reviewed the findings, diagnosis, plan and need for follow up with the patient.    Current Discharge Medication List          Final diagnoses:   Cardiogenic shock (H)   Acute ST elevation myocardial infarction (STEMI) of inferior wall (H)         [facility] EMERGENCY DEPARTMENT  7/12/2025     Cassie Mccauley MD  07/12/25 8225

## 2025-07-12 NOTE — Clinical Note
The first balloon was inserted into the right coronary artery and middle right coronary artery.Max pressure = 10 geeta. Total duration = 10 seconds.     Max pressure = 12 geeta. Total duration = 10 seconds.

## 2025-07-12 NOTE — Clinical Note
The first balloon was inserted into the right coronary artery and RPDA.Max pressure = 6 geeta. Total duration = 10 seconds.     Max pressure = 6 geeta. Total duration = 10 seconds.

## 2025-07-12 NOTE — PROGRESS NOTES
Patients daughter, Gloria Caro, was informed of current clinical events. Procedures performed (angiogram, pacer, balloon pump), the results (occlusion, stent placement) and the critical condition of the patient.

## 2025-07-12 NOTE — ED NOTES
Bed: ED01  Expected date: 7/12/25  Expected time: 1:48 PM  Means of arrival: Ambulance  Comments:  Sp 3   RED  75F  N/v  Bp 60/30

## 2025-07-13 NOTE — DEATH PRONOUNCEMENT
MD DEATH PRONOUNCEMENT    Called to pronounce Luz Thompson dead.    Physical Exam: Unresponsive to noxious stimuli, Spontaneous respirations absent, Breath sounds absent, Carotid pulse absent, Heart sounds absent, Pupillary light reflex absent, and Corneal blink reflex absent    Patient was pronounced dead at 4:54 PM, 2025.    Preliminary Cause of Death: Cardiogenic shock    Active Problems:    Cardiogenic shock (H)       Infectious disease present?: yes klebsiella, E. faecalis    Communicable disease present? (examples: HIV, chicken pox, TB, Ebola, CJD) :  NO    Multi-drug resistant organism present? (example: MRSA): NO    Please consider an autopsy if any of the following exist:  NO Unexpected or unexplained death during or following any dental, medical, or surgical diagnostic treatment procedures.   NO Death of mother at or up to seven days after delivery.     NO All  and pediatric deaths.     NO Death where the cause is sufficiently obscure to delay completion of the death certificate.   NO Deaths in which autopsy would confirm a suspected illness/condition that would affect surviving family members or recipients of transplanted organs.     The following deaths must be reported to the 's Office:  NO A death that may be due entirely or in part to any factors other than natural disease (recent surgery, recent trauma, suspected abuse/neglect).   NO A death that may be an accident, suicide, or homicide.     NO Any sudden, unexpected death in which there is no prior history of significant heart disease or any other condition associated with sudden death.   NO A death under suspicious, unusual, or unexpected circumstances.    NO Any death which is apparently due to natural causes but in which the  does not have a personal physician familiar with the patient s medical history, social, or environmental situation or the circumstances of the terminal event.   NO Any death apparently  due to Sudden Infant Death Syndrome.     NO Deaths that occur during, in association with, or as consequences of a diagnostic, therapeutic, or anesthetic procedure.   NO Any death in which a fracture of a major bone has occurred within the past (6) six months.   NO A death of persons note seen by their physician within 120 days of demise.     NO Any death in which the  was an inmate of a public institution or was in the custody of Law Enforcement personnel.   NO  All unexpected deaths of children   NO Solid organ donors   NO Unidentified bodies   NO Deaths of persons whose bodies are to be cremated or otherwise disposed of so that the bodies will later be unavailable for examination;   NO Deaths unattended by a physician outside of a licensed healthcare facility or licensed residential hospice program   NO Deaths occurring within 24 hours of arrival to a health care facility if death is unexpected.    NO Deaths associated with the decedent s employment.   NO Deaths attributed to acts of terrorism.   NO Any death in which there is uncertainty as to whether it is a medical examiner s care should be discussed with the medical investigator.        Body disposition: Autopsy was discussed with family member:  Daughter in person.  Permission for autopsy was declined.

## 2025-07-13 NOTE — PLAN OF CARE
Neuro: LIAM as patient is intubated/sedated, opens eyes to stimuli-no purposeful movement noted. Afebrile. NPI unequal, R is sluggish versus L.   CV: , 100% V paced-asystole underneath. MAPs labile, pulses present.   Pulm:  CMV-12/400/60%/PEEP 8.   GI: Ostomy in place-continuous stooling. Brown/coffee ground consistency via OG-turned to bright red blood output.   : Oliguric-UOP 5 ml.     Shift events: Patient had increasing pressor needs-angiotensin started.     Patient passed at 1654; TPM and IABP turned off per family request. Extubation performed per family request. No autopsy-no  home selected at this time. Family was given unit number and security phone number.      D50 2, Bicarb 5 given.

## 2025-07-13 NOTE — PROGRESS NOTES
[employer] was called and RT assisted with ventilation with Ambu bag, pt MELISSA and placed back on vent. PEEP increased from 5 to 10 due to low Sat when placed on vent per MD. RT continue follow.    Kenyon Bowman, RT

## 2025-07-13 NOTE — CONSULTS
Nephrology Initial Consult  July 13, 2025      Luz Thompson MRN:[id number] YOB: 1950  Date of Admission:7/12/2025  Primary care provider: Daniel Hidalgo  Requesting physician: Sharath Ahn, *    Reason for consult: acidosis    MEDICAL DECISION MAKING     ASSESSMENT/PLAN:  Luz Thompson is a 75 year old w HTN, CKD, T2DM, prior OLT, presented from SNF with shock, taken to cath lab for STEMI and found to have cardiogenic shock with right heart failure requiring IABP    Refractory acidosis  Lactic acidosis   In setting severe R heart failure, cardiogenic shock & VF arrest at 0112 on 7/13  Has liver transplant, which appears to have shock liver  Most recent ABG 7.2/20/168. serum bicarb 8. Lactate 16    DERREK on CKD  Baseline sCr ~1.3. sCr today 1.8  Likely 2/2 hemodynamic and ATN in setting of profound shock and cardiac arrest overnight      Anemia: hgb 10  Volume/HTN: on multiple pressors   Acidosis: bicarb 8  MBD: Cor Ca  WNL    After discussion with family it was determined dialysis would not be within patients wishes given how severe her critical illness is at this time     #VF arrest  #Cardiogenic shock  #Subacute inferior STEMI s/p PCI to RCA 7/12/25   #shock liver  #Primary biliary cirrhosis s/p liver transplant (2022)    Nephrology will sign off at this time, please do not hesitate to reach out with any questions or concerns.    Recommendations were communicated to primary team via note & verbal    Discussed with Dr. Shawn Grady MD  Division of Renal Disease and Hypertension  [facility] [address]  [employer]    SUBJECTIVE     HISTORY OF PRESENT ILLNESS:  Admitting provider and nursing notes reviewed  Luz Thompson is a 75 year old w HTN, CKD, T2DM, prior OLT, presented from SNF with shock, taken to cath lab for STEMI and found to have cardiogenic shock with right heart failure requiring IABP.  On EMS arrival to [facility] she was hypotensive to 58/33, nauseous and  "vomiting. Required BiPAP in ED, eventually intubated. Taken to cath labs for STEMI, found to have cardiogenic shock with right heart failure requiring IABP. After LHC she had VF arrest  at 0112 on 7/13, resulting in increased pressor needs. Rising lacatate and worsening acidosis  Daughter, grand daughter and patients friend at bedside.     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       Past Surgical History:   Procedure Laterality Date    APPENDECTOMY  1961    CATARACT IOL, RT/LT      RE12/19/2013, LE12/10/2013 - Toric lenses    CHOLECYSTECTOMY  1991    COLECTOMY WITH COLOSTOMY, COMBINED N/A 5/29/2025 "    Procedure: Open sigmoidectomy with end colostmy;  Surgeon: Al Campbell MD;  Location: [facility] OR    COLONOSCOPY  03/10/2014    Procedure: COLONOSCOPY;;  Surgeon: Gentry Ramirez MD;  Location: [facility] GI    CYSTOSCOPY      ear drum repair      ENDOBRONCHIAL ULTRASOUND FLEXIBLE N/A 09/29/2017    Procedure: ENDOBRONCHIAL ULTRASOUND FLEXIBLE;  Flexible Bronchoscopy, Endobronchial Ultrasound, Transbronchial Needle Aspiration ;  Surgeon: Eden Clinton MD;  Location: [facility] OR    ENDOSCOPIC RETROGRADE CHOLANGIOPANCREATOGRAM  09/19/2013    Procedure: ENDOSCOPIC RETROGRADE CHOLANGIOPANCREATOGRAM;  Endoscopic Retrograde Cholangiopancreatogram with single balloon enteroscopy, ballon sweep of bile duct;  Surgeon: Brett Membreno MD;  Location: [facility] OR    HC KNEE SCOPE,MED/LAT MENISECTOMY Right 08/10/2012    partial medial menisectomy only    INSERT STENT URETER Bilateral 5/29/2025    Procedure: bilateral Insert and removal stent ureter, cystoscopy;  Surgeon: David Leung MD;  Location: [facility] OR    KNEE SURGERY  1966    R knee    PICC INSERTION  09/18/2013    4fr SL PASV PICC, 40cm (1cm external) in the R basilic vein w/ tip in the low SVC    PICC INSERTION  02/21/2014    5 fr DL BioFlo Navilyst PICC, 46 cm (3 cm external) in the L basilic vein w/ tip in the SVC RA junction.    THYROIDECTOMY  03/2010    TRANSPLANT LIVER RECIPIENT LIVING UNRELATED  05/2002        MEDICATIONS:  PTA Meds  Prior to Admission medications    Medication Sig Last Dose Taking? Auth Provider Long Term End Date   acetaminophen (TYLENOL) 500 MG tablet Take 1,000 mg by mouth 2 times daily. During 4/16/25 med recc pt states take BID everyday   Unknown, Entered By History     albuterol (PROAIR HFA/PROVENTIL HFA/VENTOLIN HFA) 108 (90 Base) MCG/ACT inhaler Inhale 2 puffs into the lungs every 4 hours as needed for shortness of breath, wheezing or cough.   Manda Flor, APRN CNP Yes    apixaban ANTICOAGULANT (ELIQUIS) 2.5 MG tablet Take 1 tablet (2.5 mg) by mouth 2  times daily.   Manda Flor APRN CNP No    calcitRIOL (ROCALTROL) 0.25 MCG capsule Take 1 capsule (0.25 mcg) by mouth daily.   Rachel Masterson MBBS Yes    calcium carbonate (TUMS) 500 MG chewable tablet Take 2 tablets (1,000 mg) by mouth every 4 hours as needed for heartburn.   Manda Flor APRN CNP     Continuous Glucose Sensor (FREESTYLE NINO 2 SENSOR) MISC Change every 14 days.   Rachel Masterson MBBS     D-MANNOSE PO Take 1 Scoop by mouth 2 times daily.   Unknown, Entered By History     diphenhydrAMINE (BENADRYL) 25 MG tablet Take 25 mg by mouth every 6 hours as needed for itching or allergies.   Unknown, Entered By History     EPINEPHrine (ANY BX GENERIC EQUIV) 0.3 MG/0.3ML injection 2-pack Inject 0.3 mLs (0.3 mg) into the muscle as needed for anaphylaxis. May repeat one time in 5-15 minutes if response to initial dose is inadequate.   Luis Miguel Nielson MD     estradiol (ESTRACE) 0.1 MG/GM vaginal cream Place vaginally three times a week.   Unknown, Entered By History No    evolocumab (REPATHA SURECLICK) 140 MG/ML prefilled autoinjector Inject 1 mL (140 mg) subcutaneously every 14 days.   Manda Flor APRN CNP     ezetimibe (ZETIA) 10 MG tablet Take 1 tablet (10 mg) by mouth daily.   Kirill Zuluaga MD Yes    Ferrous Sulfate 324 MG TBEC Take 1 tablet by mouth daily.   Reported, Patient     folic acid (FOLVITE) 1 MG tablet TAKE 1 TABLET BY MOUTH DAILY   Gentry Ramirez MD     gabapentin (NEURONTIN) 250 MG/5ML solution Take 6 mLs (300 mg) by mouth at bedtime   Gentry Ramirez MD Yes    Glucagon (GVOKE HYPOPEN) 1 MG/0.2ML pen Inject the contents of 1 device under the skin into lower abdomen, outer thigh, or outer upper arm as needed for hypoglycemia. If no response after 15 minutes, additional 1 mg dose from a new device may be injected while waiting for emergency assistance.   Dianne Pederson MD Yes    glucose (BD GLUCOSE) 5 g chewable tablet Take 2 tablets (10 g) by mouth as needed (low blood sugar)    Jennifer Braraza MD     glucose 40 % (400 mg/mL) gel Take 15 g by mouth every 15 minutes as needed for low blood sugar.   Manda Flor APRN CNP     hydrOXYzine HCl (ATARAX) 25 MG tablet Take 1 tablet (25 mg) by mouth 2 times daily as needed for anxiety.   Manda Flor APRN CNP  7/23/25   hydrOXYzine HCl (ATARAX) 25 MG tablet Take 1 tablet (25 mg) by mouth daily.   Manda Flor APRN CNP     levothyroxine (SYNTHROID/LEVOTHROID) 175 MCG tablet Take 1 tablet (175 mcg) by mouth daily.   Rachel Masterson MBBS Yes    linagliptin (TRADJENTA) 5 MG TABS tablet Take 1 tablet (5 mg) by mouth daily.   Rachel Masterson MBBS Yes    metoprolol succinate ER (TOPROL XL) 50 MG 24 hr tablet Take 1 tablet (50 mg) by mouth daily. HOLD if SBP<100 and/or HR<55   Manda Flor APRN CNP Yes    metoprolol tartrate (LOPRESSOR) 25 MG tablet Take 1 tablet (25 mg) by mouth 3 times daily as needed (For HR over 110 for >5 minutes and symptoms of chest pressure, pain, or dizziness take x1. May take again in 2 hours if needed.). If HR remains high after 2 doses of this medication, please contact a medical provider   Manda Flor APRN CNP Yes    mineral oil-white petrolatum (EUCERIN/MINERIN) cream Apply to face to moisturize PRN   Manda Flor APRN CNP     Multiple Vitamins-Minerals (PRESERVISION AREDS 2) CAPS Take 1 capsule by mouth 2 times daily   Aliya Pace MD No    Nutrisource Fiber PO packet Take 1 packet by mouth daily. Benefiber   Unknown, Entered By History No    omega-3 acid ethyl esters (LOVAZA) 1 g capsule Take 1 capsule (1 g) by mouth 2 times daily.   Dianne Pederson MD     ondansetron (ZOFRAN) 4 MG tablet Take 1 tablet (4 mg) by mouth every 6 hours as needed for nausea.   Manda Flor APRN CNP     oxymetazoline (AFRIN) 0.05 % nasal spray Spray 0.2 mLs (2 sprays) into both nostrils 2 times daily as needed for congestion.   Manda Flor APRN CNP     predniSONE (DELTASONE) 1 MG tablet Take 4 tablets (4 mg)  by mouth daily. Take 4mg (1mg tablets x4) and Take with 5mg tablet to equal 9mg daily   Manda Flor APRN CNP No    predniSONE (DELTASONE) 5 MG tablet Take 1 tablet (5 mg) by mouth daily. Take with 5mg tablet with 4mg (1mg tablets x4) to equal 9mg daily   Manda Flor APRN CNP No    sertraline (ZOLOFT) 50 MG tablet Take 1 tablet (50 mg) by mouth daily.   Manda Flor APRN CNP Yes    tacrolimus (GENERIC) 1 mg/mL suspension Take 2 mLs (2 mg) by mouth 2 times daily.   Manda Flor APRN CNP     triamcinolone (KENALOG) 0.1 % external ointment Apply topically 2 times daily.   Reported, Patient No    ursodiol (ACTIGALL) 250 MG tablet Take 1 tablet (250 mg) by mouth 2 times daily.   Gentry Ramirez MD        Current Meds  Current Facility-Administered Medications   Medication Dose Route Frequency Provider Last Rate Last Admin    aspirin (ASA) chewable tablet 81 mg  81 mg Oral or Feeding Tube Daily Alexander Love MD   81 mg at 07/13/25 0733    ceftolozane-tazobactam (ZERBAXA) 1.5 g vial to attach to  ml bag  1.5 g Intravenous Q8H Alexander Love  mL/hr at 07/12/25 1831 1.5 g at 07/13/25 1014    chlorhexidine (PERIDEX) 0.12 % solution 15 mL  15 mL Swish & Spit BID Alexander Love MD   15 mL at 07/13/25 0733    famotidine (PEPCID) tablet 20 mg  20 mg Oral or Feeding Tube Daily Alexander Love MD   20 mg at 07/13/25 0733    hydrocortisone sodium succinate PF (solu-CORTEF) injection 50 mg  50 mg Intravenous Q6H Iker Allan MD   50 mg at 07/13/25 1237    linezolid (ZYVOX) infusion 600 mg  600 mg Intravenous Q12H Alexander Love MD   600 mg at 07/13/25 0620    sodium chloride (PF) 0.9% PF flush 3 mL  3 mL Intracatheter Q8H LUZMARIA Dallas, Brianna M, APRN CNP        ticagrelor (BRILINTA) tablet 90 mg  90 mg Oral or Feeding Tube BID Alexander Love MD   90 mg at 07/13/25 0619     Infusion Meds  Current Facility-Administered Medications   Medication Dose Route Frequency Provider Last Rate Last Admin    amiodarone  (NEXTERONE) 6 mg/mL in sodium chloride 0.9% in non-PVC container 250 mL MAX concentration CENTRAL line infusion  1 mg/min Intravenous Continuous Iker Allan MD 10 mL/hr at 07/13/25 1200 1 mg/min at 07/13/25 1200    angiotensin II (GIAPREZA) ADULT infusion 2.5mg/250 mL NS  1.25-40 ng/kg/min (Dosing Weight) Intravenous Continuous Iker Allan MD 19.4 mL/hr at 07/13/25 1200 40 ng/kg/min at 07/13/25 1200    dextrose 10% infusion   Intravenous Continuous PRN Alexander Love MD        EPINEPHrine (ADRENALIN) 16 mg in sodium chloride 0.9 % 250 mL infusion CENTRAL  0.01-0.3 mcg/kg/min (Dosing Weight) Intravenous Continuous Sharath Ahn MD 15.1 mL/hr at 07/13/25 1200 0.2 mcg/kg/min at 07/13/25 1200    fentaNYL (SUBLIMAZE) infusion   mcg/hr Intravenous Continuous Heydi Padilla MD 0.5 mL/hr at 07/13/25 1200 25 mcg/hr at 07/13/25 1200    insulin regular (MYXREDLIN) 1 unit/mL infusion  0-24 Units/hr Intravenous Continuous Alexander Love MD   Stopped at 07/13/25 0900    medication instruction   Does not apply Continuous PRN Brianna Stone APRN CNP        midazolam (VERSED) 100 mg/100 mL NS infusion - ADULT  1-8 mg/hr Intravenous Continuous Alexander Love MD 4 mL/hr at 07/13/25 1200 4 mg/hr at 07/13/25 1200    No anticoagulation (IABP 1:1)   Does not apply DOES NOT GO TO Brianna Morelos APRN CNP        norepinephrine (LEVOPHED) 16 mg in  mL infusion MAX CONC CENTRAL LINE  0.01-0.6 mcg/kg/min (Dosing Weight) Intravenous Continuous Sharath Ahn MD 12.1 mL/hr at 07/13/25 1200 0.16 mcg/kg/min at 07/13/25 1200    vasopressin 1 unit/mL MAX Conc (PITRESSIN) infusion  0.5-4 Units/hr Intravenous Continuous Brianna Stone APRN CNP 4 mL/hr at 07/13/25 1200 4 Units/hr at 07/13/25 1200       ALLERGIES:    Allergies   Allergen Reactions    Fluconazole Hives and Itching     Full body hives  **Intradermal skin testing negative**  [See intradermal skin testing results from  8/2/2019]    Mycophenolate Diarrhea and Nausea and Vomiting     Patient stated it was chronic and lasted months      Penicillins Anaphylaxis, Hives, Itching and Rash     **Intradermal skin testing negative**  [See intradermal skin testing results from 8/2/2019]      Simvastatin Muscle Pain (Myalgia)     severe  Other reaction(s): Myalgia caused by statin    Methotrexate Other (See Comments)     Other reaction(s): Sore  Sores in mouth, esophagus, and stomach.       Morphine And Codeine Itching and Other (See Comments)     Psych disturbance  Other reaction(s): Confusion, Mood alteration    Quinolones Anxiety, Dizziness, Headache, Other (See Comments), Palpitations and Unknown     Other reaction(s): Hyperactive behavior, Lightheadedness, Mood alteration    Dizzy, light headed    Dizziness, shaky, and jumpy    Capsules, Empty Gelatin [Gelatin]     Carvedilol Diarrhea     Per pt - severe diarrhea and LE swelling  + tolerating Toprol    Lansoprazole Diarrhea    Strawberry Extract     Azithromycin Itching     [See intradermal skin testing results from 8/2/2019]    Bactrim [Sulfamethoxazole-Trimethoprim] Other (See Comments)     Numb mouth, tingling lips (treated with anti-histamines)    Cephalosporins Itching     [See intradermal skin testing results from 8/2/2019]    Ciprofloxacin Hcl Other (See Comments) and Dizziness     Insomnia, mood lability, Irregular heart beat         Doxycycline Itching and Unknown     [See intradermal skin testing results from 8/2/2019]    Lisinopril Cough    Omeprazole Itching    Tigecycline Other (See Comments)     Perioral numbness in 2025 with long-term use    Tolectin [Nsaids] Rash    Tolmetin Rash and Itching    Tramadol Rash, Hives and Itching       SOCIAL HISTORY:   Social History     Socioeconomic History    Marital status:      Spouse name: Robbin Thompson    Number of children: 4    Years of education: 20    Highest education level: Not on file   Occupational History     "Occupation: Guardian Conservator      Employer: [employer]     Comment: social work     Employer: SEL[employer]   Tobacco Use    Smoking status: Former     Current packs/day: 0.00     Average packs/day: 1 pack/day for 18.0 years (18.0 ttl pk-yrs)     Types: Cigarettes     Start date: 1967     Quit date: 1985     Years since quittin.2    Smokeless tobacco: Never   Vaping Use    Vaping status: Never Used   Substance and Sexual Activity    Alcohol use: Yes     Alcohol/week: 0.0 standard drinks of alcohol     Comment: rare - \"I toast at weddings\"    Drug use: No    Sexual activity: Yes     Partners: Male     Birth control/protection: Post-menopausal   Other Topics Concern    Parent/sibling w/ CABG, MI or angioplasty before 65F 55M? Not Asked     Service Not Asked    Blood Transfusions Not Asked    Caffeine Concern Not Asked    Occupational Exposure Not Asked    Hobby Hazards Not Asked    Sleep Concern Not Asked    Stress Concern Not Asked    Weight Concern Not Asked    Special Diet Not Asked    Back Care Not Asked    Exercise Yes     Comment: cardio and strengthing    Bike Helmet Not Asked    Seat Belt Not Asked    Self-Exams Not Asked   Social History Narrative    She is retired. [place]. [place] h[place], [place], corn and soybean crops.     Social Drivers of Health     Financial Resource Strain: Low Risk  (2025)    Financial Resource Strain     Within the past 12 months, have you or your family members you live with been unable to get utilities (heat, electricity) when it was really needed?: No   Food Insecurity: Low Risk  (2025)    Food Insecurity     Within the past 12 months, did you worry that your food would run out before you got money to buy more?: No     Within the past 12 months, did the food you bought just not last and you didn t have money to get more?: No "   Transportation Needs: Low Risk  (2025)    Transportation Needs     Within the past 12 months, has lack of transportation kept you from medical appointments, getting your medicines, non-medical meetings or appointments, work, or from getting things that you need?: No   Physical Activity: Insufficiently Active (2023)    Received from [facility]    Exercise Vital Sign     Days of Exercise per Week: 5 days     Minutes of Exercise per Session: 10 min   Stress: No Stress Concern Present (2023)    Received from [facility]    [place] [employer] - Occupational Stress Questionnaire     Feeling of Stress : Only a little   Social Connections: Feeling Socially Integrated (2023)    Received from [facility]    OASIS : Social Isolation     Frequency of experiencing loneliness or isolation: Never   Interpersonal Safety: Low Risk  (2025)    Interpersonal Safety     Do you feel physically and emotionally safe where you currently live?: Yes     Within the past 12 months, have you been hit, slapped, kicked or otherwise physically hurt by someone?: No     Within the past 12 months, have you been humiliated or emotionally abused in other ways by your partner or ex-partner?: No   Housing Stability: Low Risk  (2025)    Housing Stability     Do you have housing? : Yes     Are you worried about losing your housing?: No       FAMILY MEDICAL HISTORY:   Family History   Problem Relation Age of Onset    Hypertension Mother     Endometrial Cancer Mother     Hyperlipidemia Mother     Prostate Cancer Father     Macular Degeneration Father     Cancer - colorectal Maternal Grandmother         in her 80's, has surgery and removal of part of kidney,  at age 98    Heart Disease Maternal Grandfather          at 98    Glaucoma Maternal Grandfather     Cerebrovascular Disease Paternal Grandmother         in her 80's    Hypertension Paternal Grandmother     Heart Disease Paternal  Grandfather         MI    Alzheimer Disease Paternal Grandfather     Allergies Son     Neurologic Disorder Daughter         Migraines    Breast Cancer Other     Anesthesia Reaction No family hx of     Crohn's Disease No family hx of     Ulcerative Colitis No family hx of     Melanoma No family hx of     Skin Cancer No family hx of        OBJECTIVE     PHYSICAL EXAM:   Temp  Av.9  F (36.1  C)  Min: 94.1  F (34.5  C)  Max: 98.6  F (37  C)  Arterial Line BP  Min: 75/30  Max: 205/46  Arterial Line MAP (mmHg)  Av.3 mmHg  Min: 42 mmHg  Max: 100 mmHg      Pulse  Av.3  Min: 30  Max: 110 Resp  Av.3  Min: 0  Max: 25  FiO2 (%)  Av.7 %  Min: 30 %  Max: 100 %  SpO2  Av.8 %  Min: 91 %  Max: 100 %    CVP (mmHg): 10 mmHg  BP (!) 65/35   Pulse 109   Temp (!) 96.6  F (35.9  C) (Bladder)   Resp 22   Wt 87.3 kg (192 lb 7.4 oz)   LMP 1988 (Approximate)   SpO2 100%   BMI 29.70 kg/m     Date 25 0700 - 25 0659   Shift 4751-6661 2407-1402 6529-0225 24 Hour Total   INTAKE   I.V. 632.59   632.59   NG/GT 60   60   Shift Total(mL/kg) 692.59(7.93)   692.59(7.93)   OUTPUT   Urine 5   5   Emesis/NG output 300   300   Shift Total(mL/kg) 305(3.49)   305(3.49)   Weight (kg) 87.3 87.3 87.3 87.3      Admit Weight: 87.3 kg (192 lb 7.4 oz)     General: supine   HEENT: ET tube in place  Cardiac: placed   Pulmonary: b/l chest rise   Extremities: modeled LE       LABS:   CMP  Recent Labs   Lab 25  1215 25  1130 25  1055 25  0948 25  0942 25  0849 25  0635 25  0409 25  0241 25  0127 25  2310 25  2159 25  1848 25  1738   NA  --   --   --   --  148*  --   --  151*  --  165*  167*  --  146*  --  145   POTASSIUM  --   --   --   --  4.2 4.0  --  3.6  --  4.2  4.5  --  3.8  --  4.5   CHLORIDE  --   --   --   --  110*  --   --  111*  --  113*  114*  --  108*  --  111*   CO2  --   --   --   --  8*  --   --  11*  --  30*  25   --  13*  --  13*   ANIONGAP  --   --   --   --  30*  --   --  29*  --  28*  --  25*  --  21*   GLC 96 109* 123* 55* 63*  --    < > 139*   < > 197*   < > 347*   < > 407*   BUN  --   --   --   --  34.8*  --   --  34.0*  --  31.8*  --  33.4*  --  30.8*   CR  --   --   --   --  1.83*  --   --  1.67*  --  1.34*  --  1.25*  --  1.30*   GFRESTIMATED  --   --   --   --  28*  --   --  31*  --  41*  --  44*  --  42*   KEILY  --   --   --   --  8.4*  --   --  8.2*  --  8.2*  --  7.9*  --  7.3*   MAG  --   --   --   --  2.4*  --   --  2.4*  --  5.0*  --   --   --  1.8   PHOS  --   --   --   --  5.7*  --   --  4.5  --  4.5  --   --   --  6.2*   PROTTOTAL  --   --   --   --  4.8*  --   --  4.3*  --   --   --  4.5*  --  3.9*   ALBUMIN  --   --   --   --  2.6*  --   --  2.5*  --   --   --  2.5*  --  2.3*   BILITOTAL  --   --   --   --  0.4  --   --  0.3  --   --   --  0.6  --  0.3   ALKPHOS  --   --   --   --  141  --   --  131  --   --   --  133  --  82   AST  --   --   --   --  3,125*  --   --  950*  --   --   --  476*  --  60*   ALT  --   --   --   --  2,619*  --   --  620*  --   --   --  266*  --  35    < > = values in this interval not displayed.     CBC  Recent Labs   Lab 07/13/25  0942 07/13/25  0409 07/13/25  0127 07/12/25  2159   HGB 10.0* 9.4* 9.0* 9.2*   WBC 28.7* 27.5* 35.4* 27.5*   RBC 3.34* 3.10* 2.99* 3.07*   HCT 33.9* 31.5* 28.8* 31.8*   * 102* 96 104*   MCH 29.9 30.3 30.1 30.0   MCHC 29.5* 29.8* 31.3* 28.9*   RDW 20.3* 19.9* 19.7* 19.8*    268 315 377     INR  Recent Labs   Lab 07/13/25  0955 07/13/25  0409 07/13/25  0127 07/12/25  1738   INR 3.19* 2.40* 2.73* 2.02*   PTT 41*  --  131*  --      ABG  Recent Labs   Lab 07/13/25  1216 07/13/25  1038 07/13/25  0942 07/13/25  0849   PH 7.20* 7.23* 7.23* 7.26*   PCO2 20* 22* 24* 23*   PO2 168* 121* 117* 79*   HCO3 8* 9* 10* 10*   O2PER 60 60 60  60 40  40      URINE STUDIES  Recent Labs   Lab Test 07/12/25  1806 06/07/25  1317 05/28/25  2138 05/20/25  2200  04/07/25  0945 04/07/25  0935 03/08/25  1737 02/07/25  0215 12/10/24  1739 11/06/24  1048 09/22/24  0233 08/20/24  1454   COLOR Yellow Yellow Light Yellow Light Yellow   < > Light Yellow   < > Yellow   < > Yellow   < > Yellow   APPEARANCE Cloudy* Clear Slightly Cloudy* Slightly Cloudy*   < > Slightly Cloudy*   < > Clear   < > Clear   < > Clear   URINEGLC 300* 50* Negative Negative   < > Negative   < > Negative   < > Negative   < > 100*   URINEBILI Negative Negative Negative Negative   < > Negative   < > Negative   < > Negative   < > Negative   URINEKETONE Negative Negative Negative Negative   < > Negative   < > Negative   < > Negative   < > Negative   SG 1.023 1.033 1.011 1.013   < > 1.007   < > 1.025   < > 1.015   < > 1.025   UBLD Small* Negative Negative Large*   < > Trace*   < > Negative   < > Negative   < > Trace*   URINEPH 8.0* 5.5 5.5 5.0   < > 5.5   < > 5.5   < > 5.5   < > 5.5   PROTEIN 300* 50* Negative 10*   < > 10*   < > 100*   < > 30*   < > 100*   UROBILINOGEN  --   --   --   --   --  0.2  --  0.2  --  0.2  --  0.2   NITRITE Negative Negative Negative Negative   < > Negative   < > Negative   < > Negative   < > Positive*   LEUKEST Large* Negative Large* Moderate*   < > Large*   < > Small*   < > Trace*   < > Small*   RBCU 19* 2 2 11*   < > 2-5*   < > 0-2   < >  --    < >  --    WBCU >182* 4 79* 71*   < > >100*   < > 10-25*   < >  --    < >  --     < > = values in this interval not displayed.     Recent Labs   Lab Test 08/05/19  1552 10/16/18  0924   UTPG 5.84* 1.22*     PTH  Recent Labs   Lab Test 04/04/25  1159 11/14/24  1506 08/05/19  1552   PTHI 110* 59 130*     IRON STUDIES  Recent Labs   Lab Test 04/07/25  0945 04/04/25  1159 08/05/19  1552 07/30/18  0855 09/13/17  0919   IRON 36* 30* 56 39  --     258 267 307  --    IRONSAT 13* 12* 21 13*  --    LAURA 44 59 118 18 44       Chandler Gardy MD

## 2025-07-13 NOTE — PROGRESS NOTES
[facility]         Intra-Aortic Balloon Pump Discontinuation:     IABP support discontinued 7/13/2025 at 1654.    Angelika Montiel, RT  ECMO Specialist  7/13/2025 6:04 PM

## 2025-07-13 NOTE — PROCEDURES
[facility]    Insert arterial line    Date/Time: 7/13/2025 3:15 AM    Performed by: Heydi Padilla MD  Authorized by: Heydi Padilla MD      UNIVERSAL PROTOCOL   Site Marked: Yes  Prior Images Obtained and Reviewed:  Yes  Required items: Required blood products, implants, devices and special equipment available    Patient identity confirmed:  Arm band, provided demographic data and hospital-assigned identification number  NA - No sedation, light sedation, or local anesthesia  Confirmation Checklist:  Patient's identity using two indicators, relevant allergies, procedure was appropriate and matched the consent or emergent situation and correct equipment/implants were available  Time out: Immediately prior to the procedure a time out was called    Universal Protocol: the Joint Commission Universal Protocol was followed    Preparation: Patient was prepped and draped in usual sterile fashion    ESBL (mL):  10  Indication:  multiple ABGs hemodynamic monitoring  Location:  Left femoral       ANESTHESIA    Anesthesia:  Local infiltration  Anesthetic Total (mL):  5      SEDATION  Patient Sedated: Yes    Sedation:  Fentanyl and midazolam  Vital signs: Vital signs monitored during sedation      PROCEDURE DETAILS    Needle Gauge:  18      Number of Attempts:  2  Post-procedure:  Line sutured and dressing applied      PROCEDURE    Patient Tolerance:  Patient tolerated the procedure well with no immediate complications  Length of time physician/provider present for 1:1 monitoring during sedation: 20

## 2025-07-13 NOTE — PROGRESS NOTES
RT compassionately extubated pt to room air per provider order. Pt was suctioned orally and via ETT prior to extubation.    Curly Solorio, RT on 7/13/2025 at 5:24 PM

## 2025-07-13 NOTE — PROGRESS NOTES
Neuro: NPI's 2.9R 3.8L improved overnight. Hypothermia upon admission 90.6F treated with bare hugger; 97.8F current  Moving all four extremities; cough/gag intact, opens eyes to voice on 4 of versed. No subjective pain.    CV: 100% V paced @100. Minimal to no edema.  Last lili CI 1.6 doctor aware SVR 1500 CVP 10, PA 26/17. Severe RV failure. RCA occlusion reicieved RCA stent. On dobutamine at 2; epi 0.14    Pulm: ETT @25 VC/AC 40%/400/ 22/8 LSC but dim. Coughs during suction. Minimal secretions.   GI: stooling in colostomy and producing gas. No active bowel regimen. Pressor requirements too high for nutrition.  : oliguric/anuric. +leukoesterase in urine being covered with abx regimen.    Drips:   Fent 25  Versed 4  Insulin 5  Levo 0.22  Epi 0.14  Vaso 4  Amio 1  Dobut 2.5  LR bolus 500 overnight; multiple bicarb pushes for acidosis and elevated bicarbonate.    Lines:  MERLIN odonnell @47 double lumen w/ venous sheath side port.   2 PIV (r hand and r upper arm)  Right groin venous sheath and arterial sheath for balloon  Left femoral A line      CODE: at approximately 1:12 patient went into vfib arrest. ROSC achieved at 0122.  IABP placed into pressure trigger mode while pacer presumably stopped during compressions. 2 amps of bicarb given as well as 300 amio and 1 magnesium. Patient currently having rising lactics (16 most recent) and increasing pressor needs. Pressor needs too high for CT run at this time and ecmo specialists unavailable for transport overnight due to several ecmo activations.

## 2025-07-13 NOTE — PROGRESS NOTES
Shift Summary:    Pt intubated and remained mechanically ventilated on the following settings:    VC/AC  RR: 22  Vt: 400  PEEP: 8  FiO2: 40%      Assessment: BS diminished all lobes and bilaterally.       Major Respiratory Related Events: Yes, code called on pt at approximately 01:12 due to pt going into v-fib arrest. RT assisted with ventilation via AMBU bag w/ rescuePOD attached. Pt achieved ROSC at 01:22. Pt placed back on mechanical ventilator. PEEP was increased from 5 to 10. FiO2 was increased from 30% to 60%.        Plan: CT run once pt is stable. Dayshift RT to follow as appropriate.       Ambu bag, mask, and valve present at bedside.       Hien Josue, RT on 7/13/2025 at 6:06 AM

## 2025-07-13 NOTE — CODE/RAPID RESPONSE
Code Note    Code was called at 01:12 AM. Initial rhythm was Vfib. CPR was undergoing when I arrived. Pads were attached and patient received 1 shock at 200 J. She was given 300 mg amiodarone, epi x2, Mg, and 2 amps bicarb. Patient was bagged by RT and saturating 100% throughout the code. On pulse check, patient was in sinus tachycardia paced at rate 100 with good doppler pulse. ROSC was achieved at 01:21. Patient will remain in CICU for ongoing care.    Heydi Padilla MD  Cardiology Fellow PGY-4

## 2025-07-14 ENCOUNTER — PATIENT OUTREACH (OUTPATIENT)
Dept: CARE COORDINATION | Facility: CLINIC | Age: 75
End: 2025-07-14

## 2025-07-14 LAB
ATRIAL RATE - MUSE: 107 BPM
DIASTOLIC BLOOD PRESSURE - MUSE: NORMAL MMHG
INTERPRETATION ECG - MUSE: NORMAL
P AXIS - MUSE: NORMAL DEGREES
PR INTERVAL - MUSE: NORMAL MS
QRS DURATION - MUSE: 150 MS
QT - MUSE: 402 MS
QTC - MUSE: 518 MS
R AXIS - MUSE: -82 DEGREES
SYSTOLIC BLOOD PRESSURE - MUSE: NORMAL MMHG
T AXIS - MUSE: 96 DEGREES
VENTRICULAR RATE- MUSE: 100 BPM

## 2025-07-14 NOTE — ANESTHESIA POSTPROCEDURE EVALUATION
Patient: Luz Thompson    Procedure: * No procedures listed *       Anesthesia Type:  No value filed.    Note:  Disposition: ICU            ICU Sign Out: Anesthesiologist/ICU physician sign out WAS performed   Postop Pain Control: Uneventful            Sign Out: Well controlled pain   PONV: No   Neuro/Psych:             Events: Altered mental status            Sign Out: PLANNED postop sedation   Airway/Respiratory:             Sign Out: AIRWAY IN SITU/Resp. Support               Airway in situ/Resp. Support: ETT   CV/Hemodynamics:             Events: Myocardial ischemia   Other NRE:    DID A NON-ROUTINE EVENT OCCUR? No           Last vitals:  Vitals:    07/13/25 1630 07/13/25 1645 07/13/25 1700   BP:      Pulse: 109 55 (!) 0   Resp: 22 22 (!) 0   Temp:   (!) 35  C (95  F)   SpO2:          Electronically Signed By: Shin Edge MD  July 13, 2025  7:08 PM

## 2025-07-14 NOTE — ANESTHESIA PROCEDURE NOTES
Airway       Patient location during procedure: OR       Procedure Start/Stop Times: 7/12/2025 3:09 PM  Staff -        Anesthesiologist:  Shin Edge MD       Resident/Fellow: Dora Rush DO       Performed By: resident  Consent for Airway        Urgency: emergent       Consent: The procedure was performed in an emergent situation.  Indications and Patient Condition       Indications for airway management: kayla-procedural       Induction type:awake       Mask difficulty assessment: 0 - not attempted    Final Airway Details       Final airway type: endotracheal airway       Successful airway: ETT - single  Endotracheal Airway Details        Cuffed: yes       Successful intubation technique: video laryngoscopy       VL Blade Size: MAC D Blade       Grade View of Cords: 1       Adjucts: stylet       Position: Right       Measured from: gums/teeth       Secured at (cm): 22    Post intubation assessment        Placement verified by: capnometry, equal breath sounds and chest rise        Number of attempts at approach: 1       Secured with: tape and commercial tube horvath       Ease of procedure: easy       Dentition: Intact    Medication(s) Administered   Medication Administration Time: 7/12/2025 3:09 PM    Additional Comments       Patient heavily sedated from procedural midazolam on arrival.  Cyanotic appearing; poor pulse-ox waveform.  Brief jaw lift and application of 100% FiO2 while preparing laryngoscope and ETT.  Patient was encephalopathic enough that tolerated mouth opening and laryngoscope placement without need for additional sedatives . Waited for cords to open with breath in, then easily passed ETT.  No complications.  CXR / follow up by primary team.

## 2025-07-14 NOTE — DISCHARGE SUMMARY
[address] [address]  p: [phone]    Discharge Summary: Cardiology Service    Luz Thompson MRN# [id number]   YOB: 1950 Age: 75 year old       Admission Date: 7/12/2025  Discharge Date: 07/14/25      Discharge Diagnoses:  1. Inferior STEMI s/p PCI to RCA   2. Cardiogenic shock  3. Acute right heart failure   4. Bradycardia s/p TVP   5. Coronary artery disease   6. Acute hypoxemic respiratory failure   7. Acute renal failure   8. Congestive hepatopathy     Consults:  Nephrology    Imaging with results:  Echocardiogram 7/12/2025:  Mild right ventricular dilation is present. Global right ventricular function  is moderately reduced.  Global and regional left ventricular function is normal with an EF of 60-65%.  Left ventricular cavity size is small and appears underfilled.  Trace tricuspid insufficiency is present. The right ventricular systolic  pressure is 6mmHg above the right atrial pressure.  Inferior vena cava measures 1.5 cm without respiratory collapse.  No pericardial effusion is present.     This study was compared with the study from 06/23/2025, RV function has  decreased, PASP is lower.    Coronary Angiogram 7/12/2025:  Subacute inferior STEMI with occlusion of the RCA  Two-vessel coronary artery disease  PCI/CELESTINE (1X) of the proximal RCA without restoration of full flow due to thrombus burden  70% stenosis of the proximo-mid LAD  Insertion of temporary pacemaker wire for bradycardia  Insertion of a intra-aortic balloon pump for cardiogenic shock  Insertion of A-line and [place]-Sandra catheter  3.  Cardiogenic shock with right heart failure      Brief HPI:  Luz Thompson is a 75 year old female who was admitted on 7/12/2025 to CICU for severe hypotension and bradycardia in florid cardiogenic shock due to subacute inferior STEMI s/p PCI to RCA with residual sluggish flow, requiring temporary pacer placement and IABP,  now on dobutamine, and intubated.      [place] has a PMHx of HTN, HLD, T2DM, Afib with RVR, CKD3b, CVA, liver transplant for PBC, Sjogren's syndrome, recent diverticulitis with sigmoidectomy and end colostomy (5/30/25), recent Klebsiella and Pseudomonas sepsis, active squamous cell carcinoma of the LUE, chronic anemia, papillary thyroid carcinoma s/p thyroidectomy (2010) now with hypothyroidism; recent admission on 6/22/25 - 6/25/25 for Afib with RVR. She now presents from SNF with shock. Per ED note, pt felt nauseous and vomited after breakfast, has been having neck spasms and fatigue. Presented with cough. Unable to obtain history, pt arrived from SNF and is now intubated.     Hospital Course by Diagnosis:  1. Inferior STEMI s/p PCI to RCA   2. Cardiogenic shock  3. Acute right heart failure   4. Bradycardia s/p TVP   5. Coronary artery disease   6. Acute hypoxemic respiratory failure   7. Acute renal failure   8. Congestive hepatopathy   Patient brought to CICU for further management of acute right sided heart failure and cardiogenic shock. Patient required  very high doses of vasopressors to maintain adequate organ perfusion. Despite this, lactate elevated, patient was anuric, renal function was worsening, and patient had acute hepatitis, all consistent with multisystem organ failure. Discussed with family who agreed patient should be DNR. Patient was supported with high doses of inotropes and vasopressors as well as inhaled NO. Despite this, patient clinically deteriorated and was pronounced dead at 1654 on 7/13/2025.       Condition on discharge  Temp:  [95  F (35  C)-97.3  F (36.3  C)] 95  F (35  C)  Pulse:  [0-110] 0  Resp:  [0-22] 0  MAP:  [12 mmHg-100 mmHg] 12 mmHg  Arterial Line BP: ()/(9-46) 51/9  SpO2:  [95 %-100 %] 97 %  Physical Exam: Unresponsive to noxious stimuli, Spontaneous respirations absent, Breath sounds absent, Carotid pulse absent, Heart sounds absent, Pupillary light reflex absent,  and Corneal blink reflex absent         Discharge medications:   Discharge Medication List as of 7/13/2025  7:05 PM        CONTINUE these medications which have NOT CHANGED    Details   acetaminophen (TYLENOL) 500 MG tablet Take 1,000 mg by mouth 2 times daily. During 4/16/25 [facility] pt states take BID everyday, Historical      albuterol (PROAIR HFA/PROVENTIL HFA/VENTOLIN HFA) 108 (90 Base) MCG/ACT inhaler Inhale 2 puffs into the lungs every 4 hours as needed for shortness of breath, wheezing or cough., Disp-18 g, R-0, E-PrescribePharmacy may dispense brand covered by insurance (Proair, or proventil or ventolin or generic albuterol inhaler)      apixaban ANTICOAGULANT (ELIQUIS) 2.5 MG tablet Take 1 tablet (2.5 mg) by mouth 2 times daily., Disp-60 tablet, R-0, E-Prescribe      calcitRIOL (ROCALTROL) 0.25 MCG capsule Take 1 capsule (0.25 mcg) by mouth daily., Disp-90 capsule, R-1, E-Prescribe      calcium carbonate (TUMS) 500 MG chewable tablet Take 2 tablets (1,000 mg) by mouth every 4 hours as needed for heartburn., No Print Out      Continuous Glucose Sensor (FREESTYLE NINO 2 SENSOR) [facility] Change every 14 days., Disp-2 each, R-5, E-PrescribeMedicare B      D-MANNOSE PO Take 1 Scoop by mouth 2 times daily., Historical      diphenhydrAMINE (BENADRYL) 25 MG tablet Take 25 mg by mouth every 6 hours as needed for itching or allergies., Historical      EPINEPHrine (ANY BX GENERIC EQUIV) 0.3 MG/0.3ML injection 2-pack Inject 0.3 mLs (0.3 mg) into the muscle as needed for anaphylaxis. May repeat one time in 5-15 minutes if response to initial dose is inadequate., Disp-2 each, R-1, E-Prescribe      estradiol (ESTRACE) 0.1 MG/GM vaginal cream Place vaginally three times a week.Historical      evolocumab (REPATHA SURECLICK) 140 MG/ML prefilled autoinjector Inject 1 mL (140 mg) subcutaneously every 14 days., Disp-6 mL, R-3, E-Prescribe      ezetimibe (ZETIA) 10 MG tablet Take 1 tablet (10 mg) by mouth daily., Disp-90 tablet,  R-3, E-Prescribe      Ferrous Sulfate 324 MG TBEC Take 1 tablet by mouth daily., Historical      folic acid (FOLVITE) 1 MG tablet TAKE 1 TABLET BY MOUTH DAILY, Disp-90 tablet, R-3, E-PrescribePlease send a replace/new response with 90-Day Supply if appropriate to maximize member benefit. Requesting 1 year supply.      gabapentin (NEURONTIN) 250 MG/5ML solution Take 6 mLs (300 mg) by mouth at bedtime, Disp-180 mL, R-0, E-Prescribe      Glucagon (GVOKE HYPOPEN) 1 MG/0.2ML pen Inject the contents of 1 device under the skin into lower abdomen, outer thigh, or outer upper arm as needed for hypoglycemia. If no response after 15 minutes, additional 1 mg dose from a new device may be injected while waiting for emergency assistance. , Transitional      glucose (BD GLUCOSE) 5 g chewable tablet Take 2 tablets (10 g) by mouth as needed (low blood sugar), Disp-40 tablet, R-1, E-Prescribe      glucose 40 % (400 mg/mL) gel Take 15 g by mouth every 15 minutes as needed for low blood sugar.No Print Out      !! hydrOXYzine HCl (ATARAX) 25 MG tablet Take 1 tablet (25 mg) by mouth 2 times daily as needed for anxiety., No Print Out      !! hydrOXYzine HCl (ATARAX) 25 MG tablet Take 1 tablet (25 mg) by mouth daily., Disp-30 tablet, R-1, E-Prescribe      levothyroxine (SYNTHROID/LEVOTHROID) 175 MCG tablet Take 1 tablet (175 mcg) by mouth daily., Disp-90 tablet, R-1, E-Prescribe      linagliptin (TRADJENTA) 5 MG TABS tablet Take 1 tablet (5 mg) by mouth daily., Disp-90 tablet, R-1, E-Prescribe      metoprolol succinate ER (TOPROL XL) 50 MG 24 hr tablet Take 1 tablet (50 mg) by mouth daily. HOLD if SBP<100 and/or HR<55, No Print Out      metoprolol tartrate (LOPRESSOR) 25 MG tablet Take 1 tablet (25 mg) by mouth 3 times daily as needed (For HR over 110 for >5 minutes and symptoms of chest pressure, pain, or dizziness take x1. May take again in 2 hours if needed.). If HR remains high after 2 doses of this medication, please contact  a  medical provider, No Print Out      mineral oil-white petrolatum (EUCERIN/MINERIN) cream Apply to face to moisturize PRNDisp-57 g, [id number]      Multiple Vitamins-Minerals (PRESERVISION AREDS 2) CAPS Take 1 capsule by mouth 2 times daily, Transitional      Nutrisource Fiber PO packet Take 1 packet by mouth daily. Benefiber, Historical      omega-3 acid ethyl esters (LOVAZA) 1 g capsule Take 1 capsule (1 g) by mouth 2 times daily., Transitional      ondansetron (ZOFRAN) 4 MG tablet Take 1 tablet (4 mg) by mouth every 6 hours as needed for nausea., No Print Out      oxymetazoline (AFRIN) 0.05 % nasal spray Spray 0.2 mLs (2 sprays) into both nostrils 2 times daily as needed for congestion., Disp-30 mL, R-0, E-Prescribe      !! predniSONE (DELTASONE) 1 MG tablet Take 4 tablets (4 mg) by mouth daily. Take 4mg (1mg tablets x4) and Take with 5mg tablet to equal 9mg daily, Disp-90 tablet, R-0, E-PrescribeSEND STAT      !! predniSONE (DELTASONE) 5 MG tablet Take 1 tablet (5 mg) by mouth daily. Take with 5mg tablet with 4mg (1mg tablets x4) to equal 9mg daily, Disp-30 tablet, R-0, E-PrescribeSEND STAT      sertraline (ZOLOFT) 50 MG tablet Take 1 tablet (50 mg) by mouth daily., Disp-30 tablet, R-0, E-Prescribe      tacrolimus (GENERIC) 1 mg/mL suspension Take 2 mLs (2 mg) by mouth 2 times daily., Disp-120 mL, R-11, E-Prescribe      triamcinolone (KENALOG) 0.1 % external ointment Apply topically 2 times daily.Historical      ursodiol (ACTIGALL) 250 MG tablet Take 1 tablet (250 mg) by mouth 2 times daily., Disp-180 tablet, R-3, E-Prescribe       !! - Potential duplicate medications found. Please discuss with provider.        Willy Allan MD  Cardiovascular Diseases Fellow

## 2025-07-14 NOTE — PROGRESS NOTES
Clinic Care Coordination Contact    Situation: Patient chart reviewed by care coordinator.    Background: Care coordination initial assessment and enrollment took place on 1/27/2025 . Upon initial assessment, patient-centered goal(s) were discussed and developed with participation from patient/caregiver.      Assessment: Upon chart review, patient is no longer a candidate for Primary Care Clinic Care Coordination enrollment due to reason stated below:  Patient has passed away.      Plan/Recommendations: RNCC will perform no further monitoring/ outreaches at this time.    Alpa Cho RN, BSN, CPHN, [facility]  [facility], and [facility]  [email or web address]  Office: [phone]  Employed by [facility]

## 2025-07-14 NOTE — ANESTHESIA PREPROCEDURE EVALUATION
"Anesthesia Pre-Procedure Evaluation    Patient: Luz Thompson   MRN: [id number] : 1950          Procedure : * No procedures listed *         Past Medical History:   Diagnosis Date    Anemia of chronic disease 10/17/2011    Anxiety     CKD (chronic kidney disease) stage 3, GFR 30-59 ml/min (H) 2012    Coccidioidomycosis, history of 2017    CVA (cerebral vascular accident) (H)     when BP was very low, small multiple infacts in frontal lobe, had \"visual field cut,\" leg weakness, and expressive aphasia - all have resolved.     Deep venous thrombosis     Diverticulosis of sigmoid colon 2013    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, history of     Received Rituxan during Summer of 2016    Glaucoma     H/O esophageal varices     Hearing loss     Hyperlipidemia 04/10/2012    Says that she does not have it anymore, not on meds    Hypertension     Hypertriglyceridemia     Liver replaced by transplant (H) 10/17/2011    Dr. 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      Past Surgical History:   Procedure Laterality Date    APPENDECTOMY      CATARACT IOL, RT/LT      RE2013, LE12/10/2013 - Toric lenses    CHOLECYSTECTOMY      COLECTOMY WITH COLOSTOMY, COMBINED N/A 2025    Procedure: Open sigmoidectomy with end colostmy;  Surgeon: Al Campbell MD;  Location: [facility] OR    COLONOSCOPY  03/10/2014    Procedure: COLONOSCOPY;;  Surgeon: Gentry Ramirez MD;  Location:  GI    " CYSTOSCOPY      ear drum repair      ENDOBRONCHIAL ULTRASOUND FLEXIBLE N/A 09/29/2017    Procedure: ENDOBRONCHIAL ULTRASOUND FLEXIBLE;  Flexible Bronchoscopy, Endobronchial Ultrasound, Transbronchial Needle Aspiration ;  Surgeon: Eden Clinton MD;  Location: [facility] OR    ENDOSCOPIC RETROGRADE CHOLANGIOPANCREATOGRAM  09/19/2013    Procedure: ENDOSCOPIC RETROGRADE CHOLANGIOPANCREATOGRAM;  Endoscopic Retrograde Cholangiopancreatogram with single balloon enteroscopy, ballon sweep of bile duct;  Surgeon: Brett Membreno MD;  Location: [facility] OR    HC KNEE SCOPE,MED/LAT MENISECTOMY Right 08/10/2012    partial medial menisectomy only    INSERT STENT URETER Bilateral 5/29/2025    Procedure: bilateral Insert and removal stent ureter, cystoscopy;  Surgeon: David Leung MD;  Location: [facility] OR    KNEE SURGERY  1966    R knee    PICC INSERTION  09/18/2013    4fr SL PASV PICC, 40cm (1cm external) in the R basilic vein w/ tip in the low SVC    PICC INSERTION  02/21/2014    5 fr DL BioFlo Navilyst PICC, 46 cm (3 cm external) in the L basilic vein w/ tip in the SVC RA junction.    THYROIDECTOMY  03/2010    TRANSPLANT LIVER RECIPIENT LIVING UNRELATED  05/2002      Allergies   Allergen Reactions    Fluconazole Hives and Itching     Full body hives  **Intradermal skin testing negative**  [See intradermal skin testing results from 8/2/2019]    Mycophenolate Diarrhea and Nausea and Vomiting     Patient stated it was chronic and lasted months      Penicillins Anaphylaxis, Hives, Itching and Rash     **Intradermal skin testing negative**  [See intradermal skin testing results from 8/2/2019]      Simvastatin Muscle Pain (Myalgia)     severe  Other reaction(s): Myalgia caused by statin    Methotrexate Other (See Comments)     Other reaction(s): Sore  Sores in mouth, esophagus, and stomach.       Morphine And Codeine Itching and Other (See Comments)     Psych disturbance  Other reaction(s): Confusion, Mood alteration    Quinolones Anxiety,  "Dizziness, Headache, Other (See Comments), Palpitations and Unknown     Other reaction(s): Hyperactive behavior, Lightheadedness, Mood alteration    Dizzy, light headed    Dizziness, shaky, and jumpy    Capsules, Empty Gelatin [Gelatin]     Carvedilol Diarrhea     Per pt - severe diarrhea and LE swelling  + tolerating Toprol    Lansoprazole Diarrhea    Strawberry Extract     Azithromycin Itching     [See intradermal skin testing results from 2019]    Bactrim [Sulfamethoxazole-Trimethoprim] Other (See Comments)     Numb mouth, tingling lips (treated with anti-histamines)    Cephalosporins Itching     [See intradermal skin testing results from 2019]    Ciprofloxacin Hcl Other (See Comments) and Dizziness     Insomnia, mood lability, Irregular heart beat         Doxycycline Itching and Unknown     [See intradermal skin testing results from 2019]    Lisinopril Cough    Omeprazole Itching    Tigecycline Other (See Comments)     Perioral numbness in  with long-term use    Tolectin [Nsaids] Rash    Tolmetin Rash and Itching    Tramadol Rash, Hives and Itching      Social History     Tobacco Use    Smoking status: Former     Current packs/day: 0.00     Average packs/day: 1 pack/day for 18.0 years (18.0 ttl pk-yrs)     Types: Cigarettes     Start date: 1967     Quit date: 1985     Years since quittin.2    Smokeless tobacco: Never   Substance Use Topics    Alcohol use: Yes     Alcohol/week: 0.0 standard drinks of alcohol     Comment: rare - \"I toast at weddings\"      Wt Readings from Last 1 Encounters:   25 87.3 kg (192 lb 7.4 oz)        Anesthesia Evaluation   Pt has had prior anesthetic.     No history of anesthetic complications       ROS/MED HX  ENT/Pulmonary:     (+)                              recent URI,          Neurologic:     (+)          CVA,    TIA,                  Cardiovascular: Comment: Acute coronary syndrome, seen in cath lab for emergent intubation intra-procedure  "   (+) Dyslipidemia hypertension- -   -  - -                        dysrhythmias, a-fib,        Previous cardiac testing   Echo: Date: 3/9/25 Results:  Interpretation Summary  No vegetation or mass identified. Global and regional left ventricular  function is normal with an EF of 60-65%.  Right ventricular function, chamber size, wall motion, and thickness are  normal.  The inferior vena cava is normal.  No pericardial effusion is present.    Stress Test:  Date: Results:    ECG Reviewed:  Date: Results:    Cath:  Date: Results:      METS/Exercise Tolerance:     Hematologic: Comments: On eliquis    (+) History of blood clots,    pt is anticoagulated,           Musculoskeletal:       GI/Hepatic: Comment: Admitted with LLQ severe abd pain, sigmoid diverticulitis with microperfusion        biliary cirrhosis s/p liver transplant in 2002      (+)           hepatitis  liver disease,       Renal/Genitourinary:     (+) renal disease, type: CRI,            Endo:     (+)  type II DM,        thyroid problem, hypothyroidism papillary thyroid carcinoma,           Psychiatric/Substance Use:       Infectious Disease: Comment:  blood cultures positive for gram negative bacilli    (+) Recent Fever,           Malignancy:   (+) Malignancy, History of Other.Other CA Remission status post Surgery.    Other: Comment: Sjogren's syndrome             Physical Exam  Airway  Mallampati: unable to assess  Neck ROM: unable to assess  Upper bite lip test: unable to assess  Mouth opening: unable to assess    Cardiovascular Rate: bradycardia   Comments: Acute coronary syndrome, seen in cath lab for emergent intubation intra-procedure  Dental     Pulmonary       Neurological   She appears lethargic.    Other Findings       OUTSIDE LABS:  CBC:   Lab Results   Component Value Date    WBC 23.7 (H) 07/13/2025    WBC 28.7 (H) 07/13/2025    HGB 7.8 (L) 07/13/2025    HGB 10.0 (L) 07/13/2025    HCT 27.6 (L) 07/13/2025    HCT 33.9 (L) 07/13/2025      (L) 07/13/2025     07/13/2025     BMP:   Lab Results   Component Value Date     (H) 07/13/2025     (H) 07/13/2025    POTASSIUM 5.6 (H) 07/13/2025    POTASSIUM 4.2 07/13/2025    CHLORIDE 109 (H) 07/13/2025    CHLORIDE 110 (H) 07/13/2025    CO2 6 (LL) 07/13/2025    CO2 8 (LL) 07/13/2025    BUN 35.4 (H) 07/13/2025    BUN 34.8 (H) 07/13/2025    CR 1.91 (H) 07/13/2025    CR 1.83 (H) 07/13/2025     (H) 07/13/2025     (H) 07/13/2025     COAGS:   Lab Results   Component Value Date    PTT 41 (H) 07/13/2025    INR 4.31 (H) 07/13/2025    FIBR 289 07/13/2025     POC:   Lab Results   Component Value Date     (H) 08/31/2019     HEPATIC:   Lab Results   Component Value Date    ALBUMIN 2.4 (L) 07/13/2025    PROTTOTAL 4.2 (L) 07/13/2025    ALT 3,694 (HH) 07/13/2025    AST 4,834 (HH) 07/13/2025    GGT 95 (H) 11/11/2008    ALKPHOS 123 07/13/2025    BILITOTAL 0.5 07/13/2025    TIFFANY 20 01/03/2025     OTHER:   Lab Results   Component Value Date    PH 7.14 (LL) 07/13/2025    LACT 22.0 (HH) 07/13/2025    A1C 5.6 07/12/2025    KEILY 7.9 (L) 07/13/2025    PHOS 8.4 (H) 07/13/2025    MAG 2.6 (H) 07/13/2025    LIPASE 29 01/03/2025    AMYLASE 40 11/12/2008    TSH 12.10 (H) 07/12/2025    T4 1.08 07/12/2025    T3 44 (L) 07/30/2018    .0 (H) 08/26/2019    SED 61 (H) 04/15/2025       Anesthesia Plan    ASA Status:  5, emergent      NPO Status: ELEVATED Aspiration Risk/Unknown   Anesthesia Type:.  Airway: oral.        Consents    Anesthesia Plan(s) and associated risks, benefits, and realistic alternatives discussed. Questions answered and patient/representative(s) expressed understanding.     - Discussed:     - Discussed with:  Not discussed               Postoperative Care         Comments:                   Shin Edge MD    I have reviewed the pertinent notes and labs in the chart from the past 30 days and (re)examined the patient.  Any updates or changes from those notes are reflected in  "this note.    Clinically Significant Risk Factors        # Hyperkalemia: Highest K = 5.6 mmol/L in last 2 days, will monitor as appropriate  # Hypernatremia: Highest Na = 167 mmol/L in last 2 days, will monitor as appropriate  # Hyperchloremia: Highest Cl = 114 mmol/L in last 2 days, will monitor as appropriate      # Hypocalcemia: Lowest iCa = 3.8 mg/dL in last 2 days, will monitor and replace as appropriate  # Hypercalcemia: corrected calcium is >10.1, will monitor as appropriate   # Anion Gap Metabolic Acidosis: Highest Anion Gap = 36 mmol/L in last 2 days, will monitor and treat as appropriate  # Hypoalbuminemia: Lowest albumin = 2.3 g/dL at 7/12/2025  5:38 PM, will monitor as appropriate  # Coagulation Defect: INR = 4.31 (Ref range: 0.85 - 1.15) and/or PTT = 41 Seconds (Ref range: 22 - 38 Seconds), will monitor for bleeding  # Thrombocytopenia: Lowest platelets = 110 in last 2 days, will monitor for bleeding   # Hypertension: Noted on problem list     # Acute Hypoxic Respiratory Failure: Based on blood gas results. Continue supplemental oxygen as needed  # Acute Hypercapnic Respiratory Failure: based on arterial blood gas results.  Continue supplemental oxygen and ventilatory support as indicated.         # Overweight: Estimated body mass index is 29.7 kg/m  as calculated from the following:    Height as of 7/10/25: 1.715 m (5' 7.5\").    Weight as of this encounter: 87.3 kg (192 lb 7.4 oz)., PRESENT ON ADMISSION     # Financial/Environmental Concerns:                 "

## 2025-07-17 LAB
BACTERIA SPEC CULT: NO GROWTH
BACTERIA SPEC CULT: NO GROWTH

## 2025-08-14 ENCOUNTER — POST MORTEM DOCUMENTATION (OUTPATIENT)
Dept: TRANSPLANT | Facility: CLINIC | Age: 75
End: 2025-08-14
Payer: MEDICARE

## (undated) DEVICE — CATH BALLOON EMERGE 2.5X15MM H7493918915250

## (undated) DEVICE — VALVE HEMOSTATIC WATCHDOG 8FR INNER LUMEN H74939343021

## (undated) DEVICE — DRAPE MAYO STAND 23X54 8337

## (undated) DEVICE — SU PDS II 3-0 SH 27" Z316H

## (undated) DEVICE — CATH URETERAL OPEN END 5FRX70CM M0064002010

## (undated) DEVICE — SU SILK 2-0 TIE 12X30" A305H

## (undated) DEVICE — MANIFOLD KIT ANGIO AUTOMATED 014613

## (undated) DEVICE — INTRO SHEATH 9FRX10CM PINNACLE RSS902

## (undated) DEVICE — INTRO SHTH 7FR 12CM DIL GW LL HUB FSTCTH .038IN

## (undated) DEVICE — CATH BALLOON NC EMERGE 3.50X15MM H7493926715350

## (undated) DEVICE — SU ETHILON 3-0 PS-1 18" 1663H

## (undated) DEVICE — DRAPE IOBAN INCISE 23X17" 6650EZ

## (undated) DEVICE — SU SILK 0 TIE 6X30" A306H

## (undated) DEVICE — KIT HAND CONTROL ACIST 014644 AR-P54

## (undated) DEVICE — CATH BALLOON EMERGE 3.0X15MM H7493918915300

## (undated) DEVICE — KIT RIGHT HEART CATH 60130719

## (undated) DEVICE — SUCTION MANIFOLD NEPTUNE 2 SYS 4 PORT 0702-020-000

## (undated) DEVICE — GUIDEWIRE VASC 0.014INX180CM RUNTHROUGH 25-1011

## (undated) DEVICE — SYR 30ML SLIP TIP W/O NDL

## (undated) DEVICE — DRAPE LEGGINGS CLEAR 8430

## (undated) DEVICE — LUBRICANT INST KIT ENDO-LUBE 220-90

## (undated) DEVICE — SYR 10ML SLIP TIP W/O NDL 303134

## (undated) DEVICE — PACK CYSTO UMMC CUSTOM

## (undated) DEVICE — CATH FINECROSS MG 1.7-2.5FR X 130CM M3-1430

## (undated) DEVICE — TUBING IRR TUR Y TYPE 2C4041

## (undated) DEVICE — SOL WATER IRRIG 1000ML BOTTLE 2F7114

## (undated) DEVICE — LEGGINGS CLEAR TELESCOPIC XLONG 55X30.75IN 6IN 8430

## (undated) DEVICE — STPL ECHELON CONTOUR CUTTER CVD 40MM GREEN GCS40G

## (undated) DEVICE — SU PDS II 0 CTX 36" Z370T

## (undated) DEVICE — KIT CATHETER INDIGO RX 140CM WITH LG LUMEN ASP TUBING

## (undated) DEVICE — SUCTION TIP YANKAUER W/O VENT K86

## (undated) DEVICE — VESSEL LOOPS YELLOW MAXI 31145694

## (undated) DEVICE — CATH GD VISTA BRITE BLU YLW 100CM 6FR JR4 6700820E

## (undated) DEVICE — STPL LINEAR CUT 75MM TLC75

## (undated) DEVICE — GUIDEWIRE SENSOR DUAL FLEX STR 0.035"X150CM M0066703080

## (undated) DEVICE — PACK HEART LEFT CUSTOM PC15OT92B

## (undated) DEVICE — DRSG MEDIPORE 3 1/2X13 3/4" 3573

## (undated) DEVICE — CATH BALLOON EMERGE 2.0X12MM H7493918912200

## (undated) DEVICE — CANISTER ENGINE FOR INDIGO SYSTEM

## (undated) DEVICE — Device

## (undated) DEVICE — ENDO ADPT BRONCH SWIVEL Y A1002

## (undated) DEVICE — LINEN TOWEL PACK X30 5481

## (undated) DEVICE — PACK THORACOTOMY UMMC LF SCV32THF13

## (undated) DEVICE — SU VICRYL 3-0 SH 8X18" UND J864D

## (undated) DEVICE — DRAIN RESERVOIR 100ML JP 0070740

## (undated) DEVICE — PREP CHLORAPREP 26ML TINTED HI-LITE ORANGE 930815

## (undated) DEVICE — GLOVE PROTEXIS POWDER FREE ORANGE 7.0  2D72PT70X

## (undated) DEVICE — SOL NACL 0.9% IRRIG 1000ML BOTTLE 2F7124

## (undated) DEVICE — LINEN TOWEL PACK X6 WHITE 5487

## (undated) DEVICE — GLOVE PROTEXIS BLUE W/NEU-THERA 7.5  2D73EB75

## (undated) DEVICE — DRAIN JACKSON PRATT ROUND SIL 19FR W/TROCAR LF JP-2232

## (undated) DEVICE — KIT DVC ANGIO IBASIXCOMPAK 13INX20ML 3WAY IN4430

## (undated) DEVICE — SURGICEL POWDER ABSORBABLE HEMOSTAT 3GM 3013SP

## (undated) DEVICE — KIT ENDO FIRST STEP DISINFECTANT 200ML W/POUCH EP-4

## (undated) DEVICE — CATH EP PACEL 5FRX110CM 1MM RIGHT HEART CURVE 401763

## (undated) DEVICE — JELLY LUBRICATING SURGILUBE 2OZ TUBE

## (undated) DEVICE — PREP SKIN SCRUB TRAY 4461A

## (undated) DEVICE — CATH ANGIO SUPERTORQUE PLUS JL4 6FRX100CM 533620

## (undated) DEVICE — KIT NEW IMAGE COLOSTOMY/ILEOSTOMY 2 3/4" LF 19354

## (undated) DEVICE — LINEN TOWEL PACK X5 5464

## (undated) DEVICE — ENDO VALVE SYR NDL KIT ULTRASOUND BRONCH NA-201SX-4022-A

## (undated) DEVICE — DRAPE SHEET MED 44X70" 9355

## (undated) DEVICE — ENDO VALVE BX EVIS MAJ-210

## (undated) DEVICE — CATH TRAY FOLEY SURESTEP 16FR W/URNE MTR STLK LATEX A303316A

## (undated) DEVICE — ENDO VALVE SUCTION BRONCH EVIS MAJ-209

## (undated) DEVICE — TUBING PRESSURE 30"

## (undated) DEVICE — INTRO SHEATH 6FRX25CM PINNACLE RSS606

## (undated) DEVICE — TUBING SUCTION 10'X3/16" N510

## (undated) DEVICE — DRAPE SHEET REV FOLD 3/4 9349

## (undated) DEVICE — PAD CHUX UNDERPAD 23X36" 676105

## (undated) DEVICE — ESU LIGASURE IMPACT OPEN SEALER/DVDR CVD LG JAW LF4418

## (undated) RX ORDER — FENTANYL CITRATE 50 UG/ML
INJECTION, SOLUTION INTRAMUSCULAR; INTRAVENOUS
Status: DISPENSED
Start: 2025-01-01

## (undated) RX ORDER — DIPHENHYDRAMINE HYDROCHLORIDE 50 MG/ML
INJECTION INTRAMUSCULAR; INTRAVENOUS
Status: DISPENSED
Start: 2017-08-31

## (undated) RX ORDER — LIDOCAINE HYDROCHLORIDE 20 MG/ML
INJECTION, SOLUTION EPIDURAL; INFILTRATION; INTRACAUDAL; PERINEURAL
Status: DISPENSED
Start: 2017-09-29

## (undated) RX ORDER — GLYCOPYRROLATE 0.2 MG/ML
INJECTION, SOLUTION INTRAMUSCULAR; INTRAVENOUS
Status: DISPENSED
Start: 2017-08-31

## (undated) RX ORDER — FENTANYL CITRATE 50 UG/ML
INJECTION, SOLUTION INTRAMUSCULAR; INTRAVENOUS
Status: DISPENSED
Start: 2017-08-31

## (undated) RX ORDER — ONDANSETRON 2 MG/ML
INJECTION INTRAMUSCULAR; INTRAVENOUS
Status: DISPENSED
Start: 2017-09-29

## (undated) RX ORDER — PROPOFOL 10 MG/ML
INJECTION, EMULSION INTRAVENOUS
Status: DISPENSED
Start: 2017-09-29

## (undated) RX ORDER — SODIUM NITROPRUSSIDE 25 MG/ML
INJECTION INTRAVENOUS
Status: DISPENSED
Start: 2025-01-01

## (undated) RX ORDER — MIDAZOLAM HCL IN 0.9 % NACL/PF 1 MG/ML
PLASTIC BAG, INJECTION (ML) INTRAVENOUS
Status: DISPENSED
Start: 2025-01-01

## (undated) RX ORDER — EPINEPHRINE IN 0.9 % SOD CHLOR 5 MG/250ML
PLASTIC BAG, INJECTION (ML) INTRAVENOUS
Status: DISPENSED
Start: 2025-01-01

## (undated) RX ORDER — BUPIVACAINE HYDROCHLORIDE 5 MG/ML
INJECTION, SOLUTION EPIDURAL; INTRACAUDAL; PERINEURAL
Status: DISPENSED
Start: 2025-04-08

## (undated) RX ORDER — LIDOCAINE HYDROCHLORIDE 40 MG/ML
SOLUTION TOPICAL
Status: DISPENSED
Start: 2017-08-31

## (undated) RX ORDER — INDOMETHACIN 25 MG/1
CAPSULE ORAL
Status: DISPENSED
Start: 2025-01-01

## (undated) RX ORDER — ALBUTEROL SULFATE 0.83 MG/ML
SOLUTION RESPIRATORY (INHALATION)
Status: DISPENSED
Start: 2017-09-29

## (undated) RX ORDER — PROPOFOL 10 MG/ML
INJECTION, EMULSION INTRAVENOUS
Status: DISPENSED
Start: 2025-05-29

## (undated) RX ORDER — LIDOCAINE HYDROCHLORIDE AND EPINEPHRINE 10; 10 MG/ML; UG/ML
INJECTION, SOLUTION INFILTRATION; PERINEURAL
Status: DISPENSED
Start: 2025-04-08

## (undated) RX ORDER — LIDOCAINE HYDROCHLORIDE 10 MG/ML
INJECTION, SOLUTION EPIDURAL; INFILTRATION; INTRACAUDAL; PERINEURAL
Status: DISPENSED
Start: 2019-08-31

## (undated) RX ORDER — EPINEPHRINE 0.1 MG/ML
INJECTION INTRAVENOUS
Status: DISPENSED
Start: 2025-01-01

## (undated) RX ORDER — HEPARIN SODIUM 200 [USP'U]/100ML
INJECTION, SOLUTION INTRAVENOUS
Status: DISPENSED
Start: 2025-01-01

## (undated) RX ORDER — HEPARIN SODIUM 1000 [USP'U]/ML
INJECTION, SOLUTION INTRAVENOUS; SUBCUTANEOUS
Status: DISPENSED
Start: 2025-05-29

## (undated) RX ORDER — FENTANYL CITRATE 50 UG/ML
INJECTION, SOLUTION INTRAMUSCULAR; INTRAVENOUS
Status: DISPENSED
Start: 2017-09-29

## (undated) RX ORDER — ALBUTEROL SULFATE 0.83 MG/ML
SOLUTION RESPIRATORY (INHALATION)
Status: DISPENSED
Start: 2017-08-31

## (undated) RX ORDER — LIDOCAINE HYDROCHLORIDE AND EPINEPHRINE 10; 10 MG/ML; UG/ML
INJECTION, SOLUTION INFILTRATION; PERINEURAL
Status: DISPENSED
Start: 2017-08-31

## (undated) RX ORDER — LIDOCAINE HYDROCHLORIDE 20 MG/ML
INJECTION, SOLUTION EPIDURAL; INFILTRATION; INTRACAUDAL; PERINEURAL
Status: DISPENSED
Start: 2017-08-31

## (undated) RX ORDER — DEXAMETHASONE SODIUM PHOSPHATE 4 MG/ML
INJECTION, SOLUTION INTRA-ARTICULAR; INTRALESIONAL; INTRAMUSCULAR; INTRAVENOUS; SOFT TISSUE
Status: DISPENSED
Start: 2017-09-29

## (undated) RX ORDER — PROPOFOL 10 MG/ML
INJECTION, EMULSION INTRAVENOUS
Status: DISPENSED
Start: 2017-08-31

## (undated) RX ORDER — CALCIUM CHLORIDE 100 MG/ML
INJECTION INTRAVENOUS; INTRAVENTRICULAR
Status: DISPENSED
Start: 2025-05-29

## (undated) RX ORDER — HEPARIN SODIUM 1000 [USP'U]/ML
INJECTION, SOLUTION INTRAVENOUS; SUBCUTANEOUS
Status: DISPENSED
Start: 2025-01-01

## (undated) RX ORDER — FENTANYL CITRATE 50 UG/ML
INJECTION, SOLUTION INTRAMUSCULAR; INTRAVENOUS
Status: DISPENSED
Start: 2025-05-29

## (undated) RX ORDER — TICAGRELOR 90 MG/1
TABLET, FILM COATED ORAL
Status: DISPENSED
Start: 2025-01-01

## (undated) RX ORDER — ONDANSETRON 2 MG/ML
INJECTION INTRAMUSCULAR; INTRAVENOUS
Status: DISPENSED
Start: 2025-01-01

## (undated) RX ORDER — DIAZEPAM 5 MG/1
TABLET ORAL
Status: DISPENSED
Start: 2025-04-08

## (undated) RX ORDER — ROCURONIUM BROMIDE 50 MG/5 ML
SYRINGE (ML) INTRAVENOUS
Status: DISPENSED
Start: 2017-09-29

## (undated) RX ORDER — LIDOCAINE HYDROCHLORIDE 10 MG/ML
INJECTION, SOLUTION EPIDURAL; INFILTRATION; INTRACAUDAL; PERINEURAL
Status: DISPENSED
Start: 2025-02-20

## (undated) RX ORDER — LIDOCAINE HYDROCHLORIDE 10 MG/ML
INJECTION, SOLUTION EPIDURAL; INFILTRATION; INTRACAUDAL; PERINEURAL
Status: DISPENSED
Start: 2017-08-31